# Patient Record
Sex: FEMALE | Race: BLACK OR AFRICAN AMERICAN | NOT HISPANIC OR LATINO | Employment: UNEMPLOYED | URBAN - METROPOLITAN AREA
[De-identification: names, ages, dates, MRNs, and addresses within clinical notes are randomized per-mention and may not be internally consistent; named-entity substitution may affect disease eponyms.]

---

## 2017-01-17 ENCOUNTER — OFFICE VISIT (OUTPATIENT)
Dept: INTERNAL MEDICINE | Facility: CLINIC | Age: 47
End: 2017-01-17
Payer: COMMERCIAL

## 2017-01-17 ENCOUNTER — PROCEDURE VISIT (OUTPATIENT)
Dept: PULMONOLOGY | Facility: CLINIC | Age: 47
End: 2017-01-17
Payer: COMMERCIAL

## 2017-01-17 ENCOUNTER — OFFICE VISIT (OUTPATIENT)
Dept: PULMONOLOGY | Facility: CLINIC | Age: 47
End: 2017-01-17
Payer: COMMERCIAL

## 2017-01-17 ENCOUNTER — LAB VISIT (OUTPATIENT)
Dept: LAB | Facility: HOSPITAL | Age: 47
End: 2017-01-17
Attending: NURSE PRACTITIONER
Payer: COMMERCIAL

## 2017-01-17 ENCOUNTER — TELEPHONE (OUTPATIENT)
Dept: INTERNAL MEDICINE | Facility: CLINIC | Age: 47
End: 2017-01-17

## 2017-01-17 VITALS
TEMPERATURE: 97 F | BODY MASS INDEX: 50.49 KG/M2 | DIASTOLIC BLOOD PRESSURE: 86 MMHG | HEIGHT: 56 IN | SYSTOLIC BLOOD PRESSURE: 132 MMHG | WEIGHT: 224.44 LBS

## 2017-01-17 VITALS
BODY MASS INDEX: 50.61 KG/M2 | DIASTOLIC BLOOD PRESSURE: 78 MMHG | SYSTOLIC BLOOD PRESSURE: 130 MMHG | OXYGEN SATURATION: 96 % | HEIGHT: 56 IN | WEIGHT: 225 LBS | HEART RATE: 100 BPM

## 2017-01-17 DIAGNOSIS — J01.90 ACUTE SINUSITIS, RECURRENCE NOT SPECIFIED, UNSPECIFIED LOCATION: ICD-10-CM

## 2017-01-17 DIAGNOSIS — M25.50 ARTHRALGIA, UNSPECIFIED JOINT: ICD-10-CM

## 2017-01-17 DIAGNOSIS — J45.41 MODERATE PERSISTENT ASTHMA WITH ACUTE EXACERBATION: ICD-10-CM

## 2017-01-17 DIAGNOSIS — G47.33 SEVERE OBSTRUCTIVE SLEEP APNEA: Primary | ICD-10-CM

## 2017-01-17 DIAGNOSIS — M79.10 MYALGIA: ICD-10-CM

## 2017-01-17 DIAGNOSIS — R52 BODY ACHES: ICD-10-CM

## 2017-01-17 DIAGNOSIS — R52 BODY ACHES: Primary | ICD-10-CM

## 2017-01-17 LAB
ANION GAP SERPL CALC-SCNC: 10 MMOL/L
BASOPHILS # BLD AUTO: 0.03 K/UL
BASOPHILS NFR BLD: 0.4 %
BUN SERPL-MCNC: 11 MG/DL
CALCIUM SERPL-MCNC: 10.1 MG/DL
CHLORIDE SERPL-SCNC: 97 MMOL/L
CO2 SERPL-SCNC: 32 MMOL/L
CREAT SERPL-MCNC: 0.7 MG/DL
CRP SERPL-MCNC: 28.5 MG/L
DIFFERENTIAL METHOD: ABNORMAL
EOSINOPHIL # BLD AUTO: 0.1 K/UL
EOSINOPHIL NFR BLD: 1.9 %
ERYTHROCYTE [DISTWIDTH] IN BLOOD BY AUTOMATED COUNT: 15.3 %
ERYTHROCYTE [SEDIMENTATION RATE] IN BLOOD BY WESTERGREN METHOD: 43 MM/HR
EST. GFR  (AFRICAN AMERICAN): >60 ML/MIN/1.73 M^2
EST. GFR  (NON AFRICAN AMERICAN): >60 ML/MIN/1.73 M^2
GLUCOSE SERPL-MCNC: 141 MG/DL
HCT VFR BLD AUTO: 35.4 %
HGB BLD-MCNC: 11 G/DL
LYMPHOCYTES # BLD AUTO: 2.4 K/UL
LYMPHOCYTES NFR BLD: 32.3 %
MCH RBC QN AUTO: 26 PG
MCHC RBC AUTO-ENTMCNC: 31.1 %
MCV RBC AUTO: 84 FL
MONOCYTES # BLD AUTO: 0.3 K/UL
MONOCYTES NFR BLD: 3.6 %
NEUTROPHILS # BLD AUTO: 4.6 K/UL
NEUTROPHILS NFR BLD: 61.8 %
PLATELET # BLD AUTO: 270 K/UL
PMV BLD AUTO: 9.4 FL
POTASSIUM SERPL-SCNC: 4 MMOL/L
RBC # BLD AUTO: 4.23 M/UL
SODIUM SERPL-SCNC: 139 MMOL/L
WBC # BLD AUTO: 7.43 K/UL

## 2017-01-17 PROCEDURE — 80048 BASIC METABOLIC PNL TOTAL CA: CPT | Mod: PO

## 2017-01-17 PROCEDURE — 3075F SYST BP GE 130 - 139MM HG: CPT | Mod: S$GLB,,, | Performed by: NURSE PRACTITIONER

## 2017-01-17 PROCEDURE — 86140 C-REACTIVE PROTEIN: CPT

## 2017-01-17 PROCEDURE — 99999 PR PBB SHADOW E&M-EST. PATIENT-LVL V: CPT | Mod: PBBFAC,,, | Performed by: NURSE PRACTITIONER

## 2017-01-17 PROCEDURE — 1159F MED LIST DOCD IN RCRD: CPT | Mod: S$GLB,,, | Performed by: NURSE PRACTITIONER

## 2017-01-17 PROCEDURE — 99213 OFFICE O/P EST LOW 20 MIN: CPT | Mod: S$GLB,,, | Performed by: NURSE PRACTITIONER

## 2017-01-17 PROCEDURE — 3078F DIAST BP <80 MM HG: CPT | Mod: S$GLB,,, | Performed by: NURSE PRACTITIONER

## 2017-01-17 PROCEDURE — 94060 EVALUATION OF WHEEZING: CPT | Mod: S$GLB,,, | Performed by: INTERNAL MEDICINE

## 2017-01-17 PROCEDURE — 85651 RBC SED RATE NONAUTOMATED: CPT | Mod: PO

## 2017-01-17 PROCEDURE — 99213 OFFICE O/P EST LOW 20 MIN: CPT | Mod: 25,S$GLB,, | Performed by: NURSE PRACTITIONER

## 2017-01-17 PROCEDURE — 85025 COMPLETE CBC W/AUTO DIFF WBC: CPT | Mod: PO

## 2017-01-17 PROCEDURE — 3079F DIAST BP 80-89 MM HG: CPT | Mod: S$GLB,,, | Performed by: NURSE PRACTITIONER

## 2017-01-17 RX ORDER — LINACLOTIDE 145 UG/1
145 CAPSULE, GELATIN COATED ORAL
Refills: 3 | COMMUNITY
Start: 2016-12-20 | End: 2020-02-18 | Stop reason: SDUPTHER

## 2017-01-17 RX ORDER — PREDNISONE 20 MG/1
20 TABLET ORAL AS DIRECTED
Qty: 20 TABLET | Refills: 0 | Status: SHIPPED | OUTPATIENT
Start: 2017-01-17 | End: 2017-01-30

## 2017-01-17 RX ORDER — CYCLOBENZAPRINE HCL 5 MG
5 TABLET ORAL 3 TIMES DAILY PRN
Qty: 10 TABLET | Refills: 0 | Status: SHIPPED | OUTPATIENT
Start: 2017-01-17 | End: 2017-02-15

## 2017-01-17 RX ORDER — AZITHROMYCIN 250 MG/1
TABLET, FILM COATED ORAL
Qty: 6 TABLET | Refills: 0 | Status: SHIPPED | OUTPATIENT
Start: 2017-01-17 | End: 2017-01-31

## 2017-01-17 RX ORDER — NAPROXEN SODIUM 550 MG/1
550 TABLET ORAL 2 TIMES DAILY WITH MEALS
Qty: 10 TABLET | Refills: 0 | Status: SHIPPED | OUTPATIENT
Start: 2017-01-17 | End: 2017-03-17 | Stop reason: ALTCHOICE

## 2017-01-17 RX ORDER — FLUOXETINE HYDROCHLORIDE 20 MG/1
20 CAPSULE ORAL DAILY
Refills: 0 | COMMUNITY
Start: 2016-12-20 | End: 2017-08-03

## 2017-01-17 NOTE — PROGRESS NOTES
Established Patient    Subjective:      Patient ID: Yolanda Betts is a 46 y.o. female.    Patient Active Problem List   Diagnosis    Essential hypertension    Esophageal reflux    Allergic rhinitis    Menopausal syndrome (hot flashes)    Migraines    Palpitations    Obesity, morbid, BMI 40.0-49.9    Atypical chest pain    Abnormal ECG    Obstructive sleep apnea    Psychophysiological insomnia    Sleep-related bruxism    Nightmares    Sleepwalking    Inadequate sleep hygiene    Diabetic polyneuropathy associated with type 2 diabetes mellitus    Insulin resistance       Problem list has been reviewed.    she has been referred by Linda Fernandez NP for evaluation and management for   Chief Complaint   Patient presents with    Asthma     w/review cpft    Sleep Apnea     CPAP 6 cm ordered 11/30/2016       Chief Complaint: Asthma (w/review cpft) and Sleep Apnea (CPAP 6 cm ordered 11/30/2016)      HPI:  Patient presents for CPAP compliance visit, however she has not received her CPAP equipment. Iris was contacted at this visit to determine hold up on CPAP equipment. CPAP 6 cm was ordered on 11/30/2016.   She also present for review of Spirometry done today related to diagnosis of Asthma.  Asthma  Patient presents for evaluation of productive cough of yellow mucous with post nasal drip over the past week has resumed flonase. The patient has been previously diagnosed with asthma kee 11/1/2016 suggestive of Asthmatic component.  Symptoms currently include productive cough and occur daily. No wheezing or asthma flare. Observed precipitants include: carpet in the room, fumes and smoke. Current limitations in activity from asthma: none. Number of days of school or work missed in the last month: not applicable.    Does she do nebulizer treatments? yes  Does she use an inhaler? yes  Does she use a spacer with MDIs? yes  Does she monitor peak flow rates? yes   What is her personal best peak flow rate: in  300's.     Previous Report Reviewed: lab reports, office notes, radiology reports and PSG.      Past Medical History: The following portions of the patient's history were reviewed and updated as appropriate:   She  has a past surgical history that includes  section; Mouth surgery (); Hysterectomy; Breast surgery (Bilateral, ); Tubal ligation; Colonoscopy; and Belt abdominoplasty ().  Her family history includes Asthma in her mother; COPD in her mother; Cancer in her father and maternal grandfather; Cataracts in her maternal grandfather, maternal grandmother, paternal grandfather, and paternal grandmother; Diabetes in her maternal grandfather, maternal grandmother, and mother; Glaucoma in her maternal grandfather and mother; Heart disease in her maternal grandfather, maternal grandmother, paternal grandfather, and paternal grandmother; Hyperlipidemia in her father, maternal grandfather, maternal grandmother, mother, paternal grandfather, and paternal grandmother; Hypertension in her father, maternal grandfather, maternal grandmother, mother, paternal grandfather, and paternal grandmother; Macular degeneration in her maternal grandfather; Thyroid disease in her mother.  She  reports that she has never smoked. She has never used smokeless tobacco. She reports that she drinks alcohol. She reports that she does not use illicit drugs.  She has a current medication list which includes the following prescription(s): albuterol, albuterol, alprazolam, aripiprazole, aspirin, bd insulin syringe ultra-fine, blood-glucose meter, duloxetine, ergocalciferol, esomeprazole, fenofibrate, fluoxetine, fluticasone, fluticasone-salmeterol 250-50 mcg/dose, insulin aspart, insulin detemir, linzess, metformin, naproxen sodium, nebulizer and compressor, pravastatin, topiramate, valsartan-hydrochlorothiazide, verapamil, zolpidem, azithromycin, and prednisone.  She is allergic to lortab [hydrocodone-acetaminophen];  neuromuscular blockers, steroidal; morphine; seconal [secobarbital sodium]; and tylox [oxycodone-acetaminophen]..    Review of Systems   Constitutional: Negative.    HENT: Positive for postnasal drip, rhinorrhea, sore throat and congestion. Negative for voice change.    Eyes: Negative.    Respiratory: Positive for apnea (pending set for CPAP 6 cm ), snoring, sputum production (intermittent yellow with post nasal drip) and use of rescue inhaler (twice a week). Negative for cough, chest tightness, shortness of breath, wheezing and dyspnea on extertion.    Cardiovascular: Negative.    Genitourinary: Negative.    Endocrine: endocrine negative   Musculoskeletal: Positive for back pain ( chronic).   Gastrointestinal: Positive for acid reflux ( controlled with nexium). Negative for nausea, vomiting and abdominal pain.   Neurological: Negative for headaches.   Hematological: Negative for adenopathy.   Psychiatric/Behavioral: The patient is not nervous/anxious.         Objective:     Physical Exam   Constitutional: She is oriented to person, place, and time. Vital signs are normal. She appears well-developed and well-nourished.  Non-toxic appearance. She does not appear ill. No distress.   HENT:   Head: Normocephalic.   Right Ear: External ear normal.   Left Ear: External ear normal.   Nose: Nose normal.   Mouth/Throat: Oropharynx is clear and moist. No oropharyngeal exudate.   Eyes: Conjunctivae are normal.   Neck: Normal range of motion. Neck supple.   Cardiovascular: Normal rate, regular rhythm, normal heart sounds and intact distal pulses.    Pulmonary/Chest: Effort normal and breath sounds normal. She has no wheezes. She has no rales.   Abdominal: Soft. Bowel sounds are normal.   Musculoskeletal: Normal range of motion.   Neurological: She is alert and oriented to person, place, and time.   Skin: Skin is warm and dry.   Psychiatric: She has a normal mood and affect. Her behavior is normal. Judgment and thought content  "normal.   Vitals reviewed.    Vitals:    01/17/17 0918   BP: 130/78   Pulse: 100   SpO2: 96%   Weight: 102.1 kg (225 lb)   Height: 4' 8" (1.422 m)     Estimated body mass index is 50.44 kg/(m^2) as calculated from the following:    Height as of this encounter: 4' 8" (1.422 m).    Weight as of this encounter: 102.1 kg (225 lb).    Personal Diagnostic Review  New order for cpap 6 cm on 11/30/2016. Related to PSG done 11/27/2106 revealed The diagnostic polysomnography revealed a severe obstructive sleep apnea / hypopnea syndrome (A + H Index = 33.9 events  / hr asleep with 18.0 respiratory event - arousals / hr asleep for the study, but no RERAs (respiratory effort - related  arousals). The mean SpO2 value was 93.6 %, moderate, minimum oxygen saturation during sleep was 86.0 %, and waking  baseline SpO2 was 99 %. Persistent, moderately loud snoring was noted.  2. CPAP was initiated at 1:17 am. The titration polysomnography revealed that 6 cm H2O pressure (C - Flex 3, humidity 2), the  most effective pressure most completely tested, was optimal, as it reduced the rate of respiratory events to zero in 1.3 hrs  sleep (0.7 hrs REM sleep). Snoring was soft at all pressures, but was not eliminated at any. Lower pressure also could be  successful (5 cm A + H I = 0.0, but with no REM sleep in 1.6 hrs sleep), or autoCPAP (4 cm - 20 cm) could be tried if  necessary    Pulmonary Functions Testing Results:  Pulmonary function tests: FEV1: 1.51  (78 % predicted), FVC:  1.97 (83 % predicted), FEV1/FVC:  77 (94% predicted).    Poor effort unable to reproduce reduce results.  Complete pft ordered, patient too late to perform today.     Assessment:     1. Severe obstructive sleep apnea    2. Moderate persistent asthma with acute exacerbation    3. Acute sinusitis, recurrence not specified, unspecified location      Orders Placed This Encounter   Procedures    Complete PFT with bronchodilator     Standing Status:   Future     Standing " Expiration Date:   1/17/2018     Plan:     Discussed diagnosis, its evaluation, treatment and usual course. All questions answered.    Severe obstructive sleep apnea  Comments:  New order was placed on 11/30/16 for CPAP 6 cm. was pending insur auth. should set up in 7-10 days.     Moderate persistent asthma with acute exacerbation  Comments:  controlled with advair 250-50, albuterol neb once daily. rare use of rescue inhaler. Peak flow done most days. prednisone taper printed if need for acute flare  Orders:  -     predniSONE (DELTASONE) 20 MG tablet; Take 1 tablet (20 mg total) by mouth as directed. 3 daily x 3 days, 2 daily x 3 days, 1 daily x 3 days, 1/2 daily x 4 days.  Dispense: 20 tablet; Refill: 0  -     Complete PFT with bronchodilator; Future; Expected date: 7/18/17    Acute sinusitis, recurrence not specified, unspecified location  Comments:  printed zpack script given to fill if worsening, continue flonase and otc cold meds.   Orders:  -     azithromycin (Z-MILENA) 250 MG tablet; As per packet instructions  Dispense: 6 tablet; Refill: 0       Total time spent in face to face counseling and coordination of care 25 minutes in face to face  discussion concerning diagnosis, prognosis, review of lab and test results, benefits of treatment as well as management of disease, counseling of patient and coordination of care between various health  care providers . Greater than half the time spent was used for coordination of care and counseling of patient. Discussion with other physicians or health care providers occurred.    Return in about 7 weeks (around 3/7/2017) for CPAP compliance follow up. Fu in 6 mo w/pulmonologist for asthma w/review complete pft.

## 2017-01-17 NOTE — MR AVS SNAPSHOT
[facility] - Pulmonary Services  [address]  [place] [address]  Phone: [phone]  Fax: [phone]                  Yolanda Betts   2017 9:00 AM   Office Visit    Description:  Female : 1970   Provider:  Linda Fernandez NP   Department[facility]  [facility]           Diagnoses this Visit        Comments    Severe obstructive sleep apnea    -  Primary New order was placed on 16 for CPAP 6 cm. was pending insur auth. should set up in 7-10 days.     Moderate persistent asthma with acute exacerbation     controlled with advair 250-50, albuterol neb once daily. rare use of rescue inhaler. Peak flow done most days. prednisone taper printed if need for acute flare    Acute sinusitis, recurrence not specified, unspecified location     printed zpack script given to fill if worsening, continue flonase and otc cold meds.            To Do List           Future Appointments        Provider Department Dept Phone    2017 8:15 AM LABORATORY, [facility] [phone]    3/6/2017 9:00 AM Robert Mejia MD [facility] - Endo/Diab/Metab [phone]    3/7/2017 9:00 AM Linda Fernandez NP [facility] Pulmonary Services [phone]    2017 8:40 AM PULMONARY LAB, [facility] Pulmonary Function S[facility] [phone]    2017 9:20 AM Sylvain Rainey MD [facility] Pulmonary Services [phone]      Goals (5 Years of Data)     None      Follow-Up and Disposition     Return in about 7 weeks (around 3/7/2017) for CPAP compliance follow up. Fu in 6 mo w/pulmonologist for asthma w/review complete pft.       These Medications        Disp Refills Start End    predniSONE (DELTASONE) 20 MG tablet 20 tablet 0 2017    Take 1 tablet (20 mg total) by mouth as directed. 3 daily x 3 days, 2 daily x 3 days, 1 daily x 3 days, 1/2 daily x 4 days. - Oral    Pharmacy: [facility]/pharmacy #[id number] - [place] - [address]  [place]  #: [phone]       azithromycin (Z-MILENA) 250 MG tablet 6 tablet 0 1/17/2017     As per packet instructions    Pharmacy: [facility]/pharmacy #[id number] - [place] - [address]  #: [phone]         [facility]     [facility] Nurse Care Line - 24/7 Assistance  Registered nurses in the [facility] provide clinical advisement, health education, appointment booking, and other advisory services.  Call for this free service at [phone].             Medications           Message regarding Medications     Verify the changes and/or additions to your medication regime listed below are the same as discussed with your clinician today.  If any of these changes or additions are incorrect, please notify your healthcare provider.        START taking these NEW medications        Refills    predniSONE (DELTASONE) 20 MG tablet 0    Sig: Take 1 tablet (20 mg total) by mouth as directed. 3 daily x 3 days, 2 daily x 3 days, 1 daily x 3 days, 1/2 daily x 4 days.    Class: Print    Route: Oral    azithromycin (Z-MILENA) 250 MG tablet 0    Sig: As per packet instructions    Class: Print      STOP taking these medications     cyclobenzaprine (FLEXERIL) 10 MG tablet Take 10 mg by mouth 3 (three) times daily as needed for Muscle spasms.    dexamethasone (DECADRON) 1 MG Tab Take 1 mg tablet by mouth the night before morning labs are drawn    cycloSPORINE (RESTASIS) 0.05 % ophthalmic emulsion Place 0.4 mLs (1 drop total) into both eyes 2 (two) times daily.    diclofenac potassium (CAMBIA) 50 mg PwPk Take 1 packet by mouth as needed.    ospemifene (OSPHENA) 60 mg Tab Take 60 mg by mouth once daily.    promethazine (PHENERGAN) 25 MG tablet Take 25 mg by mouth as needed for Nausea.           Verify that the below list of medications is an accurate representation of the medications you are currently taking.  If none reported, the list may be blank. If incorrect,  "please contact your healthcare provider. Carry this list with you in case of emergency.           Current Medications     albuterol (PROAIR HFA) 90 mcg/actuation inhaler Inhale 2 puffs into the lungs every 4 (four) hours as needed for Wheezing or Shortness of Breath.    albuterol (PROVENTIL) 2.5 mg /3 mL (0.083 %) nebulizer solution Take 3 mLs (2.5 mg total) by nebulization every 4 (four) hours.    alprazolam (XANAX) 1 MG tablet Take 1 mg by mouth 3 (three) times daily as needed.    aripiprazole (ABILIFY) 20 MG Tab Take 20 mg by mouth once daily.    aspirin (ECOTRIN) 81 MG EC tablet Take 81 mg by mouth once daily.    azithromycin (Z-MILENA) 250 MG tablet As per packet instructions    BD INSULIN SYRINGE ULTRA-FINE 1/2 mL 30 gauge x 1/2" Syrg     blood-glucose meter (FREESTYLE SYSTEM KIT) kit Dispense glucometer approved by patients insurance    duloxetine (CYMBALTA) 30 MG capsule Take 2 capsules (60 mg total) by mouth 2 (two) times daily.    ergocalciferol (ERGOCALCIFEROL) 50,000 unit Cap Take 1 capsule (50,000 Units total) by mouth every 7 days.    esomeprazole (NEXIUM) 40 MG capsule Take 1 capsule (40 mg total) by mouth once daily.    fenofibrate (TRICOR) 48 MG tablet Take 1 tablet (48 mg total) by mouth once daily.    fluoxetine (PROZAC) 20 MG capsule Take 20 mg by mouth once daily.    fluticasone (FLONASE) 50 mcg/actuation nasal spray 1 spray by Each Nare route once daily.    fluticasone-salmeterol 250-50 mcg/dose (ADVAIR) 250-50 mcg/dose diskus inhaler Inhale 1 puff into the lungs 2 (two) times daily.    insulin aspart (NOVOLOG FLEXPEN) 100 unit/mL InPn pen Inject 16 Units into the skin 3 (three) times daily with meals.    insulin detemir (LEVEMIR FLEXPEN) 100 unit/mL (3 mL) SubQ InPn pen Inject 26 Units into the skin 2 (two) times daily.    LINZESS 145 mcg Cap capsule Take 145 mcg by mouth once daily.    metformin (GLUCOPHAGE) 1000 MG tablet Take 1,000 mg by mouth 2 (two) times daily.    naproxen sodium (ANAPROX) " "550 MG tablet Take 550 mg by mouth 2 (two) times daily as needed.    nebulizer and compressor Mariela 1 Device by Misc.(Non-Drug; Combo Route) route every 4 (four) hours as needed.    pravastatin (PRAVACHOL) 80 MG tablet Take 1 tablet (80 mg total) by mouth once daily.    predniSONE (DELTASONE) 20 MG tablet Take 1 tablet (20 mg total) by mouth as directed. 3 daily x 3 days, 2 daily x 3 days, 1 daily x 3 days, 1/2 daily x 4 days.    topiramate (TOPAMAX) 50 MG tablet Take 1 tablet (50 mg total) by mouth 2 (two) times daily.    valsartan-hydrochlorothiazide (DIOVAN-HCT) 320-25 mg per tablet Take 1 tablet by mouth once daily.    verapamil (VERELAN) 180 MG C24P Take 1 capsule (180 mg total) by mouth once daily.    zolpidem (AMBIEN) 10 mg Tab Take 10 mg by mouth every evening.           Clinical Reference Information           Vital Signs - Last Recorded  Most recent update: 1/17/2017  9:18 AM by Génesis Dawson LPN    BP Pulse Ht Wt SpO2 BMI    130/78 100 4' 8" (1.422 m) 102.1 kg (225 lb) 96% 50.44 kg/m2      Blood Pressure          Most Recent Value    BP  130/78      Allergies as of 1/17/2017     Lortab [Hydrocodone-acetaminophen]    Neuromuscular Blockers, Steroidal    Morphine    Seconal [Secobarbital Sodium]    Tylox [Oxycodone-acetaminophen]      Immunizations Administered on Date of Encounter - 1/17/2017     None      Orders Placed During Today's Visit     Future Labs/Procedures Expected by Expires    Complete PFT with bronchodilator  7/18/2017 1/17/2018      "

## 2017-01-17 NOTE — MR AVS SNAPSHOT
[facility]  [address]  [place] [address]  Phone: [phone]  Fax: [phone]                  Yolanda Betts   2017 10:40 AM   Office Visit    Description:  Female : 1970   Provider:  KARRIE Sandoval   Department:  [facility] - Internal Medicine           Diagnoses this Visit        Comments    Body aches    -  Primary            To Do List           Future Appointments        Provider Department Dept Phone    2017 11:35 AM LABORATORY, [facility] [phone]    2017 2:20 PM Jacky Bowen PA-C [facility] [phone]    2017 8:15 AM LABORATORY, [facility] [phone]    3/6/2017 9:00 AM Robert Mejia MD [facility] - Endo/Diab/Metab [phone]    3/7/2017 9:00 AM Linda Fernandez NP [facility]monary Services [phone]      Goals (5 Years of Data)     None      Follow-Up and Disposition     Return if symptoms worsen or fail to improve.       These Medications        Disp Refills Start End    naproxen sodium (ANAPROX) 550 MG tablet 10 tablet 0 2017     Take 1 tablet (550 mg total) by mouth 2 (two) times daily with meals. - Oral    Pharmacy: [facility]/pharmacy #[id number] - [place] - [address] Ph #: [phone]       cyclobenzaprine (FLEXERIL) 5 MG tablet 10 tablet 0 2017     Take 1 tablet (5 mg total) by mouth 3 (three) times daily as needed for Muscle spasms. - Oral    Pharmacy: [facility]/pharmacy #[id number] - [place] - [address] AT [address] [facility] Ph #: [phone]         [facility] On Call     [facility] On Call Nurse Care Line -  Assistance  Registered nurses in the [facility] On Call Center provide clinical advisement, health education, appointment booking, and other advisory services.  Call for this free service at [phone].             Medications           Message regarding Medications      "Verify the changes and/or additions to your medication regime listed below are the same as discussed with your clinician today.  If any of these changes or additions are incorrect, please notify your healthcare provider.        START taking these NEW medications        Refills    cyclobenzaprine (FLEXERIL) 5 MG tablet 0    Sig: Take 1 tablet (5 mg total) by mouth 3 (three) times daily as needed for Muscle spasms.    Class: Normal    Route: Oral      CHANGE how you are taking these medications     Start Taking Instead of    naproxen sodium (ANAPROX) 550 MG tablet naproxen sodium (ANAPROX) 550 MG tablet    Dosage:  Take 1 tablet (550 mg total) by mouth 2 (two) times daily with meals. Dosage:  Take 550 mg by mouth 2 (two) times daily as needed.    Reason for Change:  Reorder            Verify that the below list of medications is an accurate representation of the medications you are currently taking.  If none reported, the list may be blank. If incorrect, please contact your healthcare provider. Carry this list with you in case of emergency.           Current Medications     albuterol (PROAIR HFA) 90 mcg/actuation inhaler Inhale 2 puffs into the lungs every 4 (four) hours as needed for Wheezing or Shortness of Breath.    albuterol (PROVENTIL) 2.5 mg /3 mL (0.083 %) nebulizer solution Take 3 mLs (2.5 mg total) by nebulization every 4 (four) hours.    alprazolam (XANAX) 1 MG tablet Take 1 mg by mouth 3 (three) times daily as needed.    aripiprazole (ABILIFY) 20 MG Tab Take 20 mg by mouth once daily.    aspirin (ECOTRIN) 81 MG EC tablet Take 81 mg by mouth once daily.    azithromycin (Z-MILENA) 250 MG tablet As per packet instructions    BD INSULIN SYRINGE ULTRA-FINE 1/2 mL 30 gauge x 1/2" Syrg     blood-glucose meter (FREESTYLE SYSTEM KIT) kit Dispense glucometer approved by patients insurance    cyclobenzaprine (FLEXERIL) 5 MG tablet Take 1 tablet (5 mg total) by mouth 3 (three) times daily as needed for Muscle spasms.    " duloxetine (CYMBALTA) 30 MG capsule Take 2 capsules (60 mg total) by mouth 2 (two) times daily.    ergocalciferol (ERGOCALCIFEROL) 50,000 unit Cap Take 1 capsule (50,000 Units total) by mouth every 7 days.    esomeprazole (NEXIUM) 40 MG capsule Take 1 capsule (40 mg total) by mouth once daily.    fenofibrate (TRICOR) 48 MG tablet Take 1 tablet (48 mg total) by mouth once daily.    fluoxetine (PROZAC) 20 MG capsule Take 20 mg by mouth once daily.    fluticasone (FLONASE) 50 mcg/actuation nasal spray 1 spray by Each Nare route once daily.    fluticasone-salmeterol 250-50 mcg/dose (ADVAIR) 250-50 mcg/dose diskus inhaler Inhale 1 puff into the lungs 2 (two) times daily.    insulin aspart (NOVOLOG FLEXPEN) 100 unit/mL InPn pen Inject 16 Units into the skin 3 (three) times daily with meals.    insulin detemir (LEVEMIR FLEXPEN) 100 unit/mL (3 mL) SubQ InPn pen Inject 26 Units into the skin 2 (two) times daily.    LINZESS 145 mcg Cap capsule Take 145 mcg by mouth once daily.    metformin (GLUCOPHAGE) 1000 MG tablet Take 1,000 mg by mouth 2 (two) times daily.    naproxen sodium (ANAPROX) 550 MG tablet Take 1 tablet (550 mg total) by mouth 2 (two) times daily with meals.    nebulizer and compressor Mariela 1 Device by Misc.(Non-Drug; Combo Route) route every 4 (four) hours as needed.    pravastatin (PRAVACHOL) 80 MG tablet Take 1 tablet (80 mg total) by mouth once daily.    predniSONE (DELTASONE) 20 MG tablet Take 1 tablet (20 mg total) by mouth as directed. 3 daily x 3 days, 2 daily x 3 days, 1 daily x 3 days, 1/2 daily x 4 days.    topiramate (TOPAMAX) 50 MG tablet Take 1 tablet (50 mg total) by mouth 2 (two) times daily.    valsartan-hydrochlorothiazide (DIOVAN-HCT) 320-25 mg per tablet Take 1 tablet by mouth once daily.    verapamil (VERELAN) 180 MG C24P Take 1 capsule (180 mg total) by mouth once daily.    zolpidem (AMBIEN) 10 mg Tab Take 10 mg by mouth every evening.           Clinical Reference Information           Vital  "Signs - Last Recorded  Most recent update: 1/17/2017 10:30 AM by Donnell Morley MA    BP Temp Ht Wt BMI    132/86 (BP Location: Right arm, Patient Position: Sitting, BP Method: Manual) 97.3 °F (36.3 °C) (Tympanic) 4' 8" (1.422 m) 101.8 kg (224 lb 6.9 oz) 50.32 kg/m2      Blood Pressure          Most Recent Value    BP  132/86      Allergies as of 1/17/2017     Lortab [Hydrocodone-acetaminophen]    Neuromuscular Blockers, Steroidal    Morphine    Seconal [Secobarbital Sodium]    Tylox [Oxycodone-acetaminophen]      Immunizations Administered on Date of Encounter - 1/17/2017     None      Orders Placed During Today's Visit      Normal Orders This Visit    Ambulatory Referral to Physiatry     Future Labs/Procedures Expected by Expires    C-reactive protein  1/17/2017 4/17/2017    CBC auto differential  1/17/2017 3/18/2018    Sedimentation rate, manual  1/17/2017 4/17/2017    Basic metabolic panel  7/16/2017 1/17/2018      "

## 2017-01-17 NOTE — TELEPHONE ENCOUNTER
----- Message from Dneise Mauro sent at 1/17/2017  2:21 PM CST -----  Please call pt back at [phone]. Pt was calling your office back.

## 2017-01-17 NOTE — PROGRESS NOTES
"Subjective:   Patient ID: Yolanda Betts is a 46 y.o. female.    Chief Complaint: No chief complaint on file.    HPI Comments: Pt presents today for c/o "pain all over". No precipitating or alleviating symptoms. States she was told she had fibromyalgia. Has been having pain for about 5 yrs now. ISAAC was negative. However the last 1 month the pain has worsened. States "usually give her naproxen and muscle relaxant that helps". No injury. No numbness or tingling. No rashes. No joint swelling    Also c/o some tenderness along side of tongue that has been present for 3 weeks .     Review of Systems   Constitutional: Negative for chills and fever.   HENT: Negative for congestion.    Eyes: Negative for visual disturbance.   Respiratory: Negative for cough and shortness of breath.    Cardiovascular: Negative for chest pain.   Gastrointestinal: Negative for abdominal pain.   Musculoskeletal: Positive for arthralgias and myalgias. Negative for gait problem and joint swelling.   Neurological: Negative for dizziness, weakness and numbness.       Objective:      Physical Exam   Constitutional: She is oriented to person, place, and time. She appears well-developed and well-nourished. No distress.   HENT:   Head: Normocephalic and atraumatic.   Mouth/Throat:       Eyes: Lids are normal.   Neck: Normal range of motion. Neck supple.   Cardiovascular: Normal rate, regular rhythm and normal heart sounds.    Pulmonary/Chest: Effort normal and breath sounds normal. No respiratory distress. She has no wheezes. She has no rales.   Musculoskeletal: Normal range of motion. She exhibits tenderness. She exhibits no edema or deformity.   ROM intact. Pt c/o of pain with palpation throughout muscles on body.    Lymphadenopathy:     She has no cervical adenopathy.   Neurological: She is alert and oriented to person, place, and time. She has normal strength.   Skin: Skin is warm and dry. No rash noted. She is not diaphoretic.   Psychiatric: She has " a normal mood and affect. Her behavior is normal. Judgment and thought content normal.   Nursing note and vitals reviewed.      Assessment:       1. Body aches    2. Arthralgia, unspecified joint    3. Myalgia        Plan:   Body aches  Arthralgia, unspecified joint  Myalgia  (ISAAC was negative in 12/2016)  -     CBC auto differential; Future; Expected date: 1/17/17  -     Basic metabolic panel; Future; Expected date: 7/16/17  -     Sedimentation rate, manual; Future; Expected date: 1/17/17  -     C-reactive protein; Future; Expected date: 1/17/17  -     Ambulatory Referral to Physiatry    Tongue pain  - monitor for next few days - needs to f/u with PCP if no improvement    Other orders  -    Refill on medication below per pt request naproxen sodium (ANAPROX) 550 MG tablet; Take 1 tablet (550 mg total) by mouth 2 (two) times daily with meals.  Dispense: 10 tablet; Refill: 0       cyclobenzaprine (FLEXERIL) 5 MG tablet; Take 1 tablet (5 mg total) by mouth 3 (three) times daily as needed for Muscle spasms.  Dispense: 10 tablet; Refill: 0  (pt states she had some steroids at home, instructed to not take with the naproxen - she verbalizes an understanding)    F/u in 1 week with her PCP    Will f/u with above results  Return if symptoms worsen or fail to improve.

## 2017-01-18 LAB
POST FEF 25 75: 1.08 L/S (ref 1.71–2.83)
POST FET 100: 8.65 S
POST FEV1 FVC: 68 %
POST FEV1: 1.28 L (ref 1.69–2.18)
POST FIF 50: 1.5 L/S
POST FVC: 1.88 L (ref 2.07–2.65)
POST PEF: 1.45 L/S (ref 4.69–6.33)
PRE FEF 25 75: 1.66 L/S (ref 1.71–2.83)
PRE FET 100: 8.14 S
PRE FEV1 FVC: 77 %
PRE FEV1: 1.51 L (ref 1.69–2.18)
PRE FIF 50: 1.39 L/S
PRE FVC: 1.97 L (ref 2.07–2.65)
PRE PEF: 2.37 L/S (ref 4.69–6.33)
PREDICTED FEV1 FVC: 81.46 % (ref 76.56–86.36)
PREDICTED FEV1: 1.93 L (ref 1.69–2.18)
PREDICTED FVC: 2.36 L (ref 2.07–2.65)
PROVOCATION PROTOCOL: ABNORMAL

## 2017-01-20 ENCOUNTER — INITIAL CONSULT (OUTPATIENT)
Dept: PHYSICAL MEDICINE AND REHAB | Facility: CLINIC | Age: 47
End: 2017-01-20
Payer: COMMERCIAL

## 2017-01-20 ENCOUNTER — HOSPITAL ENCOUNTER (OUTPATIENT)
Dept: RADIOLOGY | Facility: HOSPITAL | Age: 47
Discharge: HOME | End: 2017-01-20
Attending: PHYSICAL MEDICINE & REHABILITATION
Payer: COMMERCIAL

## 2017-01-20 VITALS
BODY MASS INDEX: 50.88 KG/M2 | WEIGHT: 226.19 LBS | SYSTOLIC BLOOD PRESSURE: 133 MMHG | HEART RATE: 100 BPM | DIASTOLIC BLOOD PRESSURE: 71 MMHG | HEIGHT: 56 IN

## 2017-01-20 DIAGNOSIS — M25.50 MULTIPLE JOINT PAIN: ICD-10-CM

## 2017-01-20 DIAGNOSIS — E11.42 DIABETIC POLYNEUROPATHY ASSOCIATED WITH TYPE 2 DIABETES MELLITUS: ICD-10-CM

## 2017-01-20 DIAGNOSIS — M54.16 LUMBAR RADICULAR PAIN: ICD-10-CM

## 2017-01-20 DIAGNOSIS — M54.5 LOW BACK PAIN, UNSPECIFIED BACK PAIN LATERALITY, UNSPECIFIED CHRONICITY, WITH SCIATICA PRESENCE UNSPECIFIED: Primary | ICD-10-CM

## 2017-01-20 DIAGNOSIS — M79.18 MYOFACIAL MUSCLE PAIN: Primary | ICD-10-CM

## 2017-01-20 DIAGNOSIS — M54.2 NECK PAIN: ICD-10-CM

## 2017-01-20 DIAGNOSIS — E66.01 MORBID OBESITY WITH BMI OF 50.0-59.9, ADULT: ICD-10-CM

## 2017-01-20 DIAGNOSIS — M54.5 LOW BACK PAIN, UNSPECIFIED BACK PAIN LATERALITY, UNSPECIFIED CHRONICITY, WITH SCIATICA PRESENCE UNSPECIFIED: ICD-10-CM

## 2017-01-20 PROCEDURE — 3075F SYST BP GE 130 - 139MM HG: CPT | Mod: S$GLB,,, | Performed by: PHYSICIAN ASSISTANT

## 2017-01-20 PROCEDURE — 72110 X-RAY EXAM L-2 SPINE 4/>VWS: CPT | Mod: TC,PO

## 2017-01-20 PROCEDURE — 99204 OFFICE O/P NEW MOD 45 MIN: CPT | Mod: S$GLB,,, | Performed by: PHYSICIAN ASSISTANT

## 2017-01-20 PROCEDURE — 3078F DIAST BP <80 MM HG: CPT | Mod: S$GLB,,, | Performed by: PHYSICIAN ASSISTANT

## 2017-01-20 PROCEDURE — 4010F ACE/ARB THERAPY RXD/TAKEN: CPT | Mod: S$GLB,,, | Performed by: PHYSICIAN ASSISTANT

## 2017-01-20 PROCEDURE — 72050 X-RAY EXAM NECK SPINE 4/5VWS: CPT | Mod: 26,,, | Performed by: RADIOLOGY

## 2017-01-20 PROCEDURE — 72110 X-RAY EXAM L-2 SPINE 4/>VWS: CPT | Mod: 26,,, | Performed by: RADIOLOGY

## 2017-01-20 PROCEDURE — 99999 PR PBB SHADOW E&M-EST. PATIENT-LVL III: CPT | Mod: PBBFAC,,, | Performed by: PHYSICIAN ASSISTANT

## 2017-01-20 PROCEDURE — 72050 X-RAY EXAM NECK SPINE 4/5VWS: CPT | Mod: TC,PO

## 2017-01-20 PROCEDURE — 3044F HG A1C LEVEL LT 7.0%: CPT | Mod: S$GLB,,, | Performed by: PHYSICIAN ASSISTANT

## 2017-01-20 PROCEDURE — 1159F MED LIST DOCD IN RCRD: CPT | Mod: S$GLB,,, | Performed by: PHYSICIAN ASSISTANT
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
## 2017-01-20 NOTE — LETTER
January 20, 2017      Christy Miller, [facility]  [address] [place] [address]           [facility] - Physiatry  [address] [place] [address]  Phone: [phone]  Fax: [phone]          Patient: Yolanda Betts   MR Number: [id number]   YOB: 1970   Date of Visit: 1/20/2017       Dear Christy Miller:    Thank you for referring Yolanda Betts to me for evaluation. Attached you will find relevant portions of my assessment and plan of care.    If you have questions, please do not hesitate to call me. I look forward to following Yolanda Betts along with you.    Sincerely,    Jacky Bowen PA-C    Enclosure  CC:  No Recipients    If you would like to receive this communication electronically, please contact [email or web address] or [phone] to request more information on [facility] access.    For providers and/or their staff who would like to refer a patient to [facility], please contact us through our one-stop-shop provider referral line, [facility] , at [phone].    If you feel you have received this communication in error or would no longer like to receive these types of communications, please e-mail [email or web address]

## 2017-01-23 ENCOUNTER — TELEPHONE (OUTPATIENT)
Dept: INTERNAL MEDICINE | Facility: CLINIC | Age: 47
End: 2017-01-23

## 2017-01-23 NOTE — TELEPHONE ENCOUNTER
----- Message from Shannon Pfeiffer sent at 1/23/2017 10:06 AM CST -----  Contact: pt  Pt returning previous call pt can be reached at [phone]//ThanksYW

## 2017-01-23 NOTE — TELEPHONE ENCOUNTER
Pt was calling in regards to having labs faxed to her PCP Dr. Nina Beltran at [phone].    Labs from 1/17/17 faxed to PCP.

## 2017-01-23 NOTE — TELEPHONE ENCOUNTER
----- Message from Denise Mauro sent at 1/20/2017  3:50 PM CST -----  Pt was calling Christy  back in the office. Please call her back at [phone].

## 2017-01-23 NOTE — TELEPHONE ENCOUNTER
Called pt about msg below. She states she spoke with the nurse but wanted more information. Explained to her that CRP and ESR was elevated. Phys. Med saw her and are doing more lab work, MRI,  and referred her to Rheum. R/t these elevated markers - pt verbalizes an understanding.

## 2017-01-25 ENCOUNTER — TELEPHONE (OUTPATIENT)
Dept: INTERNAL MEDICINE | Facility: CLINIC | Age: 47
End: 2017-01-25

## 2017-01-25 DIAGNOSIS — K14.6 BURNING TONGUE SYNDROME: Primary | ICD-10-CM

## 2017-01-25 NOTE — TELEPHONE ENCOUNTER
----- Message from KARRIE Sandoval sent at 1/25/2017  3:29 PM CST -----  I Spoke with patient today - she is c/o of tongue pain. I placed an order for ENT referral, please schedule and notify patient. She also states she was told she need to be established with an MD her at [facility]. Please make an appt with one of the MDs to establish care for her.   Thanks

## 2017-01-25 NOTE — TELEPHONE ENCOUNTER
Spoke with pt, she scheduled ENT appointment for 2/3/17 at 4 pm and appointment with Dr. Stafford for 1/31/17 at 3:40 pm.

## 2017-01-25 NOTE — TELEPHONE ENCOUNTER
F/u with patient today regarding her tongue pain. She states she is still having pain. Referral placed to ENT. She reports that she was told that she also needed to establish care with an MD here at [facility]. Msg sent to nursing staff for ENT appt and to make an appt with MD. Pt verbalizes an understanding about the above.

## 2017-01-26 ENCOUNTER — HOSPITAL ENCOUNTER (OUTPATIENT)
Dept: RADIOLOGY | Facility: HOSPITAL | Age: 47
Discharge: HOME | End: 2017-01-26
Attending: ORTHOPAEDIC SURGERY
Payer: COMMERCIAL

## 2017-01-26 DIAGNOSIS — M54.16 LUMBAR RADICULAR PAIN: ICD-10-CM

## 2017-01-26 DIAGNOSIS — M79.18 MYOFACIAL MUSCLE PAIN: ICD-10-CM

## 2017-01-26 DIAGNOSIS — E66.01 MORBID OBESITY WITH BMI OF 50.0-59.9, ADULT: ICD-10-CM

## 2017-01-26 DIAGNOSIS — M25.50 MULTIPLE JOINT PAIN: ICD-10-CM

## 2017-01-26 DIAGNOSIS — E11.42 DIABETIC POLYNEUROPATHY ASSOCIATED WITH TYPE 2 DIABETES MELLITUS: ICD-10-CM

## 2017-01-26 PROCEDURE — 72148 MRI LUMBAR SPINE W/O DYE: CPT | Mod: 26,,, | Performed by: RADIOLOGY

## 2017-01-26 PROCEDURE — 72148 MRI LUMBAR SPINE W/O DYE: CPT | Mod: TC,PO

## 2017-01-30 DIAGNOSIS — E78.5 HYPERLIPIDEMIA, UNSPECIFIED HYPERLIPIDEMIA TYPE: ICD-10-CM

## 2017-01-30 RX ORDER — PRAVASTATIN SODIUM 80 MG/1
TABLET ORAL
Qty: 30 TABLET | Refills: 12 | Status: SHIPPED | OUTPATIENT
Start: 2017-01-30 | End: 2018-02-07 | Stop reason: SDUPTHER

## 2017-01-31 ENCOUNTER — OFFICE VISIT (OUTPATIENT)
Dept: INTERNAL MEDICINE | Facility: CLINIC | Age: 47
End: 2017-01-31
Payer: COMMERCIAL

## 2017-01-31 ENCOUNTER — LAB VISIT (OUTPATIENT)
Dept: LAB | Facility: HOSPITAL | Age: 47
End: 2017-01-31
Attending: INTERNAL MEDICINE
Payer: COMMERCIAL

## 2017-01-31 VITALS
BODY MASS INDEX: 51.27 KG/M2 | TEMPERATURE: 97 F | WEIGHT: 227.94 LBS | OXYGEN SATURATION: 99 % | HEIGHT: 56 IN | HEART RATE: 106 BPM | DIASTOLIC BLOOD PRESSURE: 70 MMHG | SYSTOLIC BLOOD PRESSURE: 130 MMHG

## 2017-01-31 DIAGNOSIS — E66.01 OBESITY, MORBID, BMI 40.0-49.9: ICD-10-CM

## 2017-01-31 DIAGNOSIS — J45.20 MILD INTERMITTENT ASTHMA WITHOUT COMPLICATION: ICD-10-CM

## 2017-01-31 DIAGNOSIS — E55.9 VITAMIN D DEFICIENCY: ICD-10-CM

## 2017-01-31 DIAGNOSIS — E11.42 DIABETIC POLYNEUROPATHY ASSOCIATED WITH TYPE 2 DIABETES MELLITUS: ICD-10-CM

## 2017-01-31 DIAGNOSIS — Z00.00 PREVENTATIVE HEALTH CARE: ICD-10-CM

## 2017-01-31 DIAGNOSIS — I10 ESSENTIAL HYPERTENSION: ICD-10-CM

## 2017-01-31 DIAGNOSIS — K21.9 GASTROESOPHAGEAL REFLUX DISEASE, ESOPHAGITIS PRESENCE NOT SPECIFIED: ICD-10-CM

## 2017-01-31 DIAGNOSIS — F31.9 BIPOLAR 1 DISORDER: ICD-10-CM

## 2017-01-31 DIAGNOSIS — E78.5 HYPERLIPIDEMIA, UNSPECIFIED HYPERLIPIDEMIA TYPE: ICD-10-CM

## 2017-01-31 PROCEDURE — 90732 PPSV23 VACC 2 YRS+ SUBQ/IM: CPT | Mod: S$GLB,ICN,, | Performed by: INTERNAL MEDICINE

## 2017-01-31 PROCEDURE — 36415 COLL VENOUS BLD VENIPUNCTURE: CPT | Mod: PO

## 2017-01-31 PROCEDURE — 99999 PR PBB SHADOW E&M-EST. PATIENT-LVL III: CPT | Mod: PBBFAC,,, | Performed by: INTERNAL MEDICINE

## 2017-01-31 PROCEDURE — 3044F HG A1C LEVEL LT 7.0%: CPT | Mod: S$GLB,ICN,, | Performed by: INTERNAL MEDICINE

## 2017-01-31 PROCEDURE — 83036 HEMOGLOBIN GLYCOSYLATED A1C: CPT

## 2017-01-31 PROCEDURE — 1159F MED LIST DOCD IN RCRD: CPT | Mod: S$GLB,ICN,, | Performed by: INTERNAL MEDICINE

## 2017-01-31 PROCEDURE — 90471 IMMUNIZATION ADMIN: CPT | Mod: S$GLB,ICN,, | Performed by: INTERNAL MEDICINE

## 2017-01-31 PROCEDURE — 3075F SYST BP GE 130 - 139MM HG: CPT | Mod: S$GLB,ICN,, | Performed by: INTERNAL MEDICINE

## 2017-01-31 PROCEDURE — 3078F DIAST BP <80 MM HG: CPT | Mod: S$GLB,ICN,, | Performed by: INTERNAL MEDICINE

## 2017-01-31 PROCEDURE — 99214 OFFICE O/P EST MOD 30 MIN: CPT | Mod: 25,S$GLB,ICN, | Performed by: INTERNAL MEDICINE

## 2017-01-31 PROCEDURE — 4010F ACE/ARB THERAPY RXD/TAKEN: CPT | Mod: S$GLB,ICN,, | Performed by: INTERNAL MEDICINE

## 2017-01-31 RX ORDER — FENOFIBRATE 48 MG/1
48 TABLET, FILM COATED ORAL DAILY
Qty: 90 TABLET | Refills: 1 | Status: SHIPPED | OUTPATIENT
Start: 2017-01-31 | End: 2018-02-26

## 2017-01-31 RX ORDER — METFORMIN HYDROCHLORIDE 1000 MG/1
1000 TABLET ORAL 2 TIMES DAILY
Qty: 180 TABLET | Refills: 3 | Status: SHIPPED | OUTPATIENT
Start: 2017-01-31 | End: 2018-02-07 | Stop reason: SDUPTHER

## 2017-01-31 NOTE — MR AVS SNAPSHOT
[facility] Internal Medicine  [address] [place] [address]  Phone: [phone]  Fax: [phone]                  Yolanda Betts   2017 3:40 PM   Office Visit    Description:  Female : 1970   Provider:  Vish Stafford MD   Department:  [facility] - Internal Medicine           Reason for Visit     Establish Care           Diagnoses this Visit        Comments    Diabetic polyneuropathy associated with type 2 diabetes mellitus    -  Primary     Essential hypertension         Obesity, morbid, BMI 40.0-49.9         Gastroesophageal reflux disease, esophagitis presence not specified         Hyperlipidemia, unspecified hyperlipidemia type         Preventative health care         Uncontrolled type 2 diabetes mellitus with diabetic polyneuropathy, with long-term current use of insulin         Vitamin D deficiency         Mild intermittent asthma without complication         Bipolar 1 disorder                To Do List           Future Appointments        Provider Department Dept Phone    2017 5:45 PM LAB, SAME DAY [facility] [phone]    2/3/2017 4:00 PM Chandu Dave MD [facility] [phone]    2017 8:15 AM LABORATORY, [facility] [phone]    3/6/2017 9:00 AM Robert Mejia MD [facility] - Endo/Diab/Metab [phone]    3/7/2017 9:00 AM Linda Fernandez NP [facility] Pulmonary Services [phone]      Goals (5 Years of Data)     None      Follow-Up and Disposition     Return in about 3 months (around 2017), or if symptoms worsen or fail to improve.       These Medications        Disp Refills Start End    metformin (GLUCOPHAGE) 1000 MG tablet 180 tablet 3 2017     Take 1 tablet (1,000 mg total) by mouth 2 (two) times daily. - Oral    Pharmacy: [facility]/pharmacy #[id number] - [place] - [address] AT [facility] Ph #: [phone]       fenofibrate (TRICOR) 48 MG tablet 90 tablet 1 2017      Take 1 tablet (48 mg total) by mouth once daily. - Oral    Pharmacy: [facility]/pharmacy #[id number] - [place], [place] - [address] AT [facility] #: [phone]         [facility] On Call     [facility] On Call Nurse Care Line - 24/7 Assistance  Registered nurses in the [facility] On Call Center provide clinical advisement, health education, appointment booking, and other advisory services.  Call for this free service at [phone].             Medications           Message regarding Medications     Verify the changes and/or additions to your medication regime listed below are the same as discussed with your clinician today.  If any of these changes or additions are incorrect, please notify your healthcare provider.        CHANGE how you are taking these medications     Start Taking Instead of    metformin (GLUCOPHAGE) 1000 MG tablet metformin (GLUCOPHAGE) 1000 MG tablet    Dosage:  Take 1 tablet (1,000 mg total) by mouth 2 (two) times daily. Dosage:  Take 1,000 mg by mouth 2 (two) times daily.    Reason for Change:  Reorder       STOP taking these medications     esomeprazole (NEXIUM) 40 MG capsule Take 1 capsule (40 mg total) by mouth once daily.    azithromycin (Z-MILENA) 250 MG tablet As per packet instructions           Verify that the below list of medications is an accurate representation of the medications you are currently taking.  If none reported, the list may be blank. If incorrect, please contact your healthcare provider. Carry this list with you in case of emergency.           Current Medications     albuterol (PROAIR HFA) 90 mcg/actuation inhaler Inhale 2 puffs into the lungs every 4 (four) hours as needed for Wheezing or Shortness of Breath.    albuterol (PROVENTIL) 2.5 mg /3 mL (0.083 %) nebulizer solution Take 3 mLs (2.5 mg total) by nebulization every 4 (four) hours.    alprazolam (XANAX) 1 MG tablet Take 1 mg by mouth 3 (three) times daily as needed.    aripiprazole (ABILIFY) 20  "MG Tab Take 20 mg by mouth once daily.    aspirin (ECOTRIN) 81 MG EC tablet Take 81 mg by mouth once daily.    BD INSULIN SYRINGE ULTRA-FINE 1/2 mL 30 gauge x 1/2" Syrg     blood-glucose meter (FREESTYLE SYSTEM KIT) kit Dispense glucometer approved by patients insurance    cyclobenzaprine (FLEXERIL) 5 MG tablet Take 1 tablet (5 mg total) by mouth 3 (three) times daily as needed for Muscle spasms.    duloxetine (CYMBALTA) 30 MG capsule Take 2 capsules (60 mg total) by mouth 2 (two) times daily.    ergocalciferol (ERGOCALCIFEROL) 50,000 unit Cap Take 1 capsule (50,000 Units total) by mouth every 7 days.    fenofibrate (TRICOR) 48 MG tablet Take 1 tablet (48 mg total) by mouth once daily.    fluoxetine (PROZAC) 20 MG capsule Take 20 mg by mouth once daily.    fluticasone (FLONASE) 50 mcg/actuation nasal spray 1 spray by Each Nare route once daily.    fluticasone-salmeterol 250-50 mcg/dose (ADVAIR) 250-50 mcg/dose diskus inhaler Inhale 1 puff into the lungs 2 (two) times daily.    insulin aspart (NOVOLOG FLEXPEN) 100 unit/mL InPn pen Inject 16 Units into the skin 3 (three) times daily with meals.    insulin detemir (LEVEMIR FLEXPEN) 100 unit/mL (3 mL) SubQ InPn pen Inject 26 Units into the skin 2 (two) times daily.    LINZESS 145 mcg Cap capsule Take 145 mcg by mouth once daily.    metformin (GLUCOPHAGE) 1000 MG tablet Take 1 tablet (1,000 mg total) by mouth 2 (two) times daily.    naproxen sodium (ANAPROX) 550 MG tablet Take 1 tablet (550 mg total) by mouth 2 (two) times daily with meals.    nebulizer and compressor Mariela 1 Device by Misc.(Non-Drug; Combo Route) route every 4 (four) hours as needed.    pravastatin (PRAVACHOL) 80 MG tablet TAKE 1 TABLET BY MOUTH ONCE DAILY    topiramate (TOPAMAX) 50 MG tablet Take 1 tablet (50 mg total) by mouth 2 (two) times daily.    valsartan-hydrochlorothiazide (DIOVAN-HCT) 320-25 mg per tablet Take 1 tablet by mouth once daily.    verapamil (VERELAN) 180 MG C24P Take 1 capsule (180 " "mg total) by mouth once daily.    zolpidem (AMBIEN) 10 mg Tab Take 10 mg by mouth every evening.           Clinical Reference Information           Vital Signs - Last Recorded  Most recent update: 1/31/2017  3:46 PM by Mayela Gurrola MA    BP Pulse Temp Ht Wt SpO2    130/70 106 97.3 °F (36.3 °C) (Tympanic) 4' 8" (1.422 m) 103.4 kg (227 lb 15.3 oz) 99%    BMI                51.11 kg/m2          Blood Pressure          Most Recent Value    BP  130/70      Allergies as of 1/31/2017     Lortab [Hydrocodone-acetaminophen]    Neuromuscular Blockers, Steroidal    Morphine    Seconal [Secobarbital Sodium]    Tylox [Oxycodone-acetaminophen]      Immunizations Administered on Date of Encounter - 1/31/2017     Name Date Dose VIS Date Route    Pneumococcal Polysaccharide - 23 Valent  Incomplete 0.5 mL 4/24/2015 Intramuscular      Orders Placed During Today's Visit      Normal Orders This Visit    Ambulatory referral to Gynecology     Pneumococcal Polysaccharide Vaccine (23 Valent) (SQ/IM)     Future Labs/Procedures Expected by Expires    Hemoglobin A1c  1/31/2017 1/31/2018      Instructions    Contact your endocrinologist regarding your recent glucose readings.        "

## 2017-01-31 NOTE — PROGRESS NOTES
"Subjective:      Patient ID: Yolanda Betts is a 46 y.o. female.    Chief Complaint: Establish Care    HPI Comments: 45 yo with Patient Active Problem List:     Essential hypertension     Esophageal reflux     Allergic rhinitis     Menopausal syndrome (hot flashes)     Migraines     Palpitations     Obesity, morbid, BMI 40.0-49.9     Atypical chest pain     Abnormal ECG     Obstructive sleep apnea     Psychophysiological insomnia     Sleep-related bruxism     Nightmares     Sleepwalking     Inadequate sleep hygiene     Diabetic polyneuropathy associated with type 2 diabetes mellitus     Insulin resistance    Here today for management of multiple medical problems present for months to years.  She reports compliance with her medications without significant side effects with the exception of not taking vitamin D supplementation and not taking Nexium.  Nexium is too expensive and she requests another medication for her chronic GERD symptoms. Reports glucose readings worsened after mult doses of steroids due to asthma. Last dose of steroids was early December. Only mild improvement this month. Home glucose 110-120. Some nf readings 200-400s.     Review of Systems   Constitutional: Negative for chills and fever.   HENT: Negative for ear pain and sore throat.    Respiratory: Negative for cough, shortness of breath and wheezing.    Cardiovascular: Negative for chest pain and palpitations.   Gastrointestinal: Negative for abdominal pain and blood in stool.   Genitourinary: Negative for dysuria and hematuria.   Musculoskeletal: Positive for arthralgias and myalgias.   Skin: Negative for rash.   Neurological: Negative for syncope.     Objective:     Visit Vitals    /70    Pulse 106    Temp 97.3 °F (36.3 °C) (Tympanic)    Ht 4' 8" (1.422 m)    Wt 103.4 kg (227 lb 15.3 oz)    SpO2 99%    BMI 51.11 kg/m2       Physical Exam   Constitutional: She is oriented to person, place, and time. She appears well-developed and " well-nourished. No distress.   HENT:   Head: Normocephalic and atraumatic.   Mouth/Throat: Oropharynx is clear and moist.   Eyes: Conjunctivae and EOM are normal.   Cardiovascular: Normal rate and regular rhythm.    Pulmonary/Chest: Breath sounds normal. She has no wheezes. She has no rales.   Neurological: She is alert and oriented to person, place, and time.   Skin: Skin is warm and dry.   Psychiatric: She has a normal mood and affect. Her behavior is normal.     Lab Visit on 01/20/2017   Component Date Value Ref Range Status    ISAAC Screen 01/20/2017 Negative <1:160  Negative <1:160 Final    CCP Antibodies 01/20/2017 <0.5  <5.0 U/mL Final    Rheumatoid Factor 01/20/2017 <10.0  0.0 - 15.0 IU/mL Final       Assessment:     1. Uncontrolled type 2 diabetes mellitus with diabetic polyneuropathy, with long-term current use of insulin    2. Diabetic polyneuropathy associated with type 2 diabetes mellitus    3. Essential hypertension    4. Obesity, morbid, BMI 40.0-49.9    5. Gastroesophageal reflux disease, esophagitis presence not specified    6. Hyperlipidemia, unspecified hyperlipidemia type    7. Preventative health care    8. Vitamin D deficiency    9. Mild intermittent asthma without complication    10. Bipolar 1 disorder      Plan:   Uncontrolled type 2 diabetes mellitus with diabetic polyneuropathy, with long-term current use of insulin  Recently increase Levemir as directed by her endocrinologist  Has had only one hypoglycemic episode  Refilled metformin  Advised patient to submit recent glucose readings as requested by her endocrinologist  -     metformin (GLUCOPHAGE) 1000 MG tablet; Take 1 tablet (1,000 mg total) by mouth 2 (two) times daily.  Dispense: 180 tablet; Refill: 3    Diabetic polyneuropathy associated with type 2 diabetes mellitus  Stable on Cymbalta    -     Hemoglobin A1c; Future; Expected date: 1/31/17  -     Pneumococcal Polysaccharide Vaccine (23 Valent) (SQ/IM)    Essential  hypertension  Controlled    Obesity, morbid, BMI 40.0-49.9  Diet and exercise discussed  Complete cortisol testing as ordered by endocrinology    Gastroesophageal reflux disease, esophagitis presence not specified  Try omeprazole    Hyperlipidemia, unspecified hyperlipidemia type  Stable  refilled  -     fenofibrate (TRICOR) 48 MG tablet; Take 1 tablet (48 mg total) by mouth once daily.  Dispense: 90 tablet; Refill: 1    Preventative health care  -     Ambulatory referral to Gynecology    Vitamin D deficiency  Resume vitamin D supplement    Mild intermittent asthma without complication  Stable    Bipolar 1 disorder  Continue meds and follow-up as per psychiatry          Lab Frequency Next Occurrence   Complete PFT with bronchodilator Once 11/29/2016   Hemoglobin A1c Once 5/19/2017   CORTISOL, 8AM Once 12/1/2016   Complete PFT with bronchodilator Once 7/18/2017         Return in about 3 months (around 4/30/2017), or if symptoms worsen or fail to improve.

## 2017-02-02 LAB
ESTIMATED AVG GLUCOSE: 197 MG/DL
HBA1C MFR BLD HPLC: 8.5 %

## 2017-02-09 ENCOUNTER — OFFICE VISIT (OUTPATIENT)
Dept: PAIN MEDICINE | Facility: CLINIC | Age: 47
End: 2017-02-09
Payer: COMMERCIAL

## 2017-02-09 VITALS
HEIGHT: 56 IN | HEART RATE: 97 BPM | SYSTOLIC BLOOD PRESSURE: 142 MMHG | BODY MASS INDEX: 51.07 KG/M2 | WEIGHT: 227 LBS | DIASTOLIC BLOOD PRESSURE: 95 MMHG | RESPIRATION RATE: 16 BRPM

## 2017-02-09 DIAGNOSIS — G62.9 PERIPHERAL POLYNEUROPATHY: ICD-10-CM

## 2017-02-09 DIAGNOSIS — M79.18 MYOFASCIAL PAIN: Primary | ICD-10-CM

## 2017-02-09 DIAGNOSIS — M47.817 SPONDYLOSIS OF LUMBOSACRAL REGION WITHOUT MYELOPATHY OR RADICULOPATHY: ICD-10-CM

## 2017-02-09 PROCEDURE — 3080F DIAST BP >= 90 MM HG: CPT | Mod: S$GLB,,, | Performed by: ANESTHESIOLOGY

## 2017-02-09 PROCEDURE — 3077F SYST BP >= 140 MM HG: CPT | Mod: S$GLB,,, | Performed by: ANESTHESIOLOGY

## 2017-02-09 PROCEDURE — 99999 PR PBB SHADOW E&M-EST. PATIENT-LVL III: CPT | Mod: PBBFAC,,, | Performed by: ANESTHESIOLOGY

## 2017-02-09 PROCEDURE — 99204 OFFICE O/P NEW MOD 45 MIN: CPT | Mod: S$GLB,,, | Performed by: ANESTHESIOLOGY

## 2017-02-09 RX ORDER — GABAPENTIN 300 MG/1
300 CAPSULE ORAL 3 TIMES DAILY
Qty: 90 CAPSULE | Refills: 0 | Status: SHIPPED | OUTPATIENT
Start: 2017-02-09 | End: 2017-03-17 | Stop reason: SDUPTHER

## 2017-02-09 NOTE — LETTER
February 9, 2017      Jacky Bowen PA-C  [address] [place] [address] [address] [place] [address]           [facility]  [address] [place] [address] [place] [address]  Phone: [phone]  Fax: [phone]          Patient: Yolanda Betts   MR Number: [id number]   YOB: 1970   Date of Visit: 2/9/2017       Dear Jacky Bowen:    Thank you for referring Yolanda Betts to me for evaluation. Attached you will find relevant portions of my assessment and plan of care.    If you have questions, please do not hesitate to call me. I look forward to following Yolanda Betts along with you.    Sincerely,    Grey Moran MD    Enclosure  CC:  No Recipients    If you would like to receive this communication electronically, please contact [email or web address] or [phone] to request more information on [employer] Link access.    For providers and/or their staff who would like to refer a patient to [facility], please contact us through our one-stop-shop provider referral line, [facility] , at [phone].    If you feel you have received this communication in error or would no longer like to receive these types of communications, please e-mail [email or web address]

## 2017-02-09 NOTE — PROGRESS NOTES
Chief Pain Complaint:  Low Back Pain, Left Leg Pain    History of Present Illness:   This patient is a 46 y.o. female who presents today complaining of the above noted pain/s. The patient describes the pain as follows.    - duration of pain: 10 years   - timing: constant   - character: aching, numbing  - radiating, dermatomal: extends into left leg, not strictly dermatomal  - antecedent trauma, prior spinal surgery: no prior trauma, no prior spinal surgery   - pertinent negatives: No fever, No chills, No weight loss, No bladder dysfunction, No bowel dysfunction, No saddle anesthesia  - pertinent positives: left leg weakness    - medications, other therapies tried (physical therapy, injections):     >> Tylenol, flexeril, NSAIDs, cymbalta    >> Has previously undergone Physical Therapy    >> Has NOT previously undergone spinal injection/s      Imaging / Labs / Studies (reviewed on 2/9/2017):      Results for orders placed during the hospital encounter of 01/26/17   MRI Lumbar Spine Without Contrast    Narrative No priors. Multiplanar sequences obtained of the lumbar spine.  Findings: Vertebral alignment is normal. The conus ends at the level of L1. Intervertebral discs are well-hydrated and disc spaces are maintained. No compression fractures or acute osseous abnormalities are identified. Axial images were acquired through the disc spaces from L1 to to L5-S1.  The L1-2 disc space is unremarkable.  The L2-3 disc space is unremarkable.  The L3-4 disc space is unremarkable.  The L4-5 disc space is unremarkable.  At L5-S1 there is facet arthropathy more pronounced on the right. There is no significant disc herniation, canal or foraminal stenosis.         Results for orders placed during the hospital encounter of 01/20/17   X-Ray Lumbar Spine Complete 5 View    Narrative Five views of the lumbar spine  Findings: The vertebral bodies demonstrate normal height.  The alignment is within normal limits. The disk space heights  "are maintained. Prominent bilateral facet arthropathy noted at the L5-S1 level with more mild facet arthropathy noted at L4-L5 level bilaterally. No pars defects.              Results for orders placed during the hospital encounter of 01/20/17   X-Ray Cervical Spine Complete 5 view    Narrative Five views of the cervical spine  Findings: The vertebral bodies demonstrate normal height.  The alignment is within normal limits. The disk space heights are maintained. The C1-C2 articulation is within normal limits. No osseus encroachment noted upon the neural foraminal canals. No prevertebral soft tissue swelling.         Review of Systems:  CONSTITUTIONAL: patient denies any fever, chills, or weight loss  SKIN: patient denies any rash or itching  RESPIRATORY: patient reports dyspnea with rest  GASTROINTESTINAL: patient reports constipation  GENITOURINARY: patient reports urgency    MUSCULOSKELETAL:  - patient complains of the above noted pain/s (see chief pain complaint)    NEUROLOGICAL:   - pain as above  - strength in Lower extremities is decreased, on the LEFT  - sensation in Lower extremities is abnormal, on the LEFT  - patient denies any loss of bowel or bladder control      PSYCHIATRIC: patient reports a history of anxiety and depression    Other:  All other systems reviewed and are negative      Physical Exam:  Visit Vitals    BP (!) 142/95 (BP Location: Right arm, Patient Position: Sitting, BP Method: Automatic)    Pulse 97    Resp 16    Ht 4' 8" (1.422 m)    Wt 103 kg (227 lb)    BMI 50.89 kg/m2    (reviewed on 2/9/2017)  General: alert and oriented, in no apparent distress  Gait: normal gait  Skin: No rashes, No discoloration, No obvious lesions  HEENT: EOMI  Cardiovascular: no significant peripheral edema present  Respiratory: respirations nonlabored    Musculoskeletal:  - Any pain on flexion, extension, rotation:    >> pain on extension and rotation  - Straight Leg Raise:     >> LEFT :: negative    >> " RIGHT :: negative    - Any tenderness to palpation across paraspinal muscles, joints, bursae:     >> many tender points including across lumbar paraspinals    Neuro:  - Extremity Strength:     >> LEFT :: 5/5    >> RIGHT :: 5/5  - Extremity Reflexes:    >> LEFT  :: 2+    >> RIGHT :: 2+  - Sensory (sensation to light touch):    >> LEFT :: intact    >> RIGHT :: intact     Psych:  Mood and affect is appropriate      Assessment:  Myofascial Pain  Lumbar Spondylosis  Peripheral neuropathy    Plan:  Patient has primarily myofascial pain.  MRI reviewed, only minor facet dz.  Patient scoring on the American College of Rheumatology Fibromyalgia Diagnostic Questionnaire is consistent with fibromyalgia diagnosis.  I will trial gabapentin, she will continue cymbalta.  I recommend alpha lipoic acid.  I discuss cognitive behavioral therapy, she can address this with her psychiatrist at next visit.  I recommend daily exercise, weight loss, quality sleep.  Imaging / studies reviewed, detailed above.  I discussed in detail the risks, benefits, and alternatives to any and all potential treatment options.  All questions and concerns were fully addressed today in clinic.      >>Pain Disability Index:      >>Opioid Risk Tool:  2/9/2017 :: 10

## 2017-02-09 NOTE — MR AVS SNAPSHOT
[facility]  [address] [facility] [address]  Phone: [phone]  Fax: [phone]                  Yolanda Betts   2017 3:00 PM   Office Visit    Description:  Female : 1970   Provider:  Grey Moran MD   Department:  [facility]           Reason for Visit     Low-back Pain           Diagnoses this Visit        Comments    Myofascial pain    -  Primary            To Do List           Future Appointments        Provider Department Dept Phone    2017 3:30 PM Ashish Gibbs MD [facility] - OB/ [phone]    3/6/2017 9:00 AM Robert Mejia MD [facility] - Endo/Diab/Metab [phone]    3/7/2017 9:00 AM Linda Fernandez NP [facility] - Pulmonary Services [phone]    3/15/2017 10:40 AM Fallon Zacarias PA-C [facility] [phone]    3/24/2017 2:00 PM Tona Kessler MD [facility] - Rheumatology [phone]      Goals (5 Years of Data)     None      Follow-Up and Disposition     Return in about 4 weeks (around 3/9/2017).       These Medications        Disp Refills Start End    gabapentin (NEURONTIN) 300 MG capsule 90 capsule 0 2017 3/11/2017    Take 1 capsule (300 mg total) by mouth 3 (three) times daily. Take 1 cap qhs for 1 wk, then 2 caps qhs x 1 wk, then 3 caps qhs thereafter - Oral    Pharmacy: [facility]/pharmacy #[id number] - [place], [place] - [address] AT [facility] Ph #: [phone]         [facility] On Call     [facility] On [facility] Nurse Care Line -  Assistance  Registered nurses in the [facility] Center provide clinical advisement, health education, appointment booking, and other advisory services.  Call for this free service at [phone].             Medications           Message regarding Medications     Verify the changes and/or additions to your medication regime listed below are the same as discussed with your clinician today.  If any of these changes or  "additions are incorrect, please notify your healthcare provider.        START taking these NEW medications        Refills    gabapentin (NEURONTIN) 300 MG capsule 0    Sig: Take 1 capsule (300 mg total) by mouth 3 (three) times daily. Take 1 cap qhs for 1 wk, then 2 caps qhs x 1 wk, then 3 caps qhs thereafter    Class: Normal    Route: Oral           Verify that the below list of medications is an accurate representation of the medications you are currently taking.  If none reported, the list may be blank. If incorrect, please contact your healthcare provider. Carry this list with you in case of emergency.           Current Medications     albuterol (PROAIR HFA) 90 mcg/actuation inhaler Inhale 2 puffs into the lungs every 4 (four) hours as needed for Wheezing or Shortness of Breath.    albuterol (PROVENTIL) 2.5 mg /3 mL (0.083 %) nebulizer solution Take 3 mLs (2.5 mg total) by nebulization every 4 (four) hours.    alprazolam (XANAX) 1 MG tablet Take 1 mg by mouth 3 (three) times daily as needed.    aripiprazole (ABILIFY) 20 MG Tab Take 20 mg by mouth once daily.    aspirin (ECOTRIN) 81 MG EC tablet Take 81 mg by mouth once daily.    BD INSULIN SYRINGE ULTRA-FINE 1/2 mL 30 gauge x 1/2" Syrg     blood-glucose meter (FREESTYLE SYSTEM KIT) kit Dispense glucometer approved by patients insurance    cyclobenzaprine (FLEXERIL) 5 MG tablet Take 1 tablet (5 mg total) by mouth 3 (three) times daily as needed for Muscle spasms.    duloxetine (CYMBALTA) 30 MG capsule Take 2 capsules (60 mg total) by mouth 2 (two) times daily.    ergocalciferol (ERGOCALCIFEROL) 50,000 unit Cap Take 1 capsule (50,000 Units total) by mouth every 7 days.    fenofibrate (TRICOR) 48 MG tablet Take 1 tablet (48 mg total) by mouth once daily.    fluoxetine (PROZAC) 20 MG capsule Take 20 mg by mouth once daily.    fluticasone (FLONASE) 50 mcg/actuation nasal spray 1 spray by Each Nare route once daily.    fluticasone-salmeterol 250-50 mcg/dose (ADVAIR) " "250-50 mcg/dose diskus inhaler Inhale 1 puff into the lungs 2 (two) times daily.    insulin aspart (NOVOLOG FLEXPEN) 100 unit/mL InPn pen Inject 16 Units into the skin 3 (three) times daily with meals.    insulin detemir (LEVEMIR FLEXPEN) 100 unit/mL (3 mL) SubQ InPn pen Inject 26 Units into the skin 2 (two) times daily.    LINZESS 145 mcg Cap capsule Take 145 mcg by mouth once daily.    metformin (GLUCOPHAGE) 1000 MG tablet Take 1 tablet (1,000 mg total) by mouth 2 (two) times daily.    naproxen sodium (ANAPROX) 550 MG tablet Take 1 tablet (550 mg total) by mouth 2 (two) times daily with meals.    nebulizer and compressor Mariela 1 Device by Misc.(Non-Drug; Combo Route) route every 4 (four) hours as needed.    pravastatin (PRAVACHOL) 80 MG tablet TAKE 1 TABLET BY MOUTH ONCE DAILY    topiramate (TOPAMAX) 50 MG tablet Take 1 tablet (50 mg total) by mouth 2 (two) times daily.    valsartan-hydrochlorothiazide (DIOVAN-HCT) 320-25 mg per tablet Take 1 tablet by mouth once daily.    verapamil (VERELAN) 180 MG C24P Take 1 capsule (180 mg total) by mouth once daily.    zolpidem (AMBIEN) 10 mg Tab Take 10 mg by mouth every evening.    gabapentin (NEURONTIN) 300 MG capsule Take 1 capsule (300 mg total) by mouth 3 (three) times daily. Take 1 cap qhs for 1 wk, then 2 caps qhs x 1 wk, then 3 caps qhs thereafter           Clinical Reference Information           Your Vitals Were     BP Pulse Resp Height Weight BMI    142/95 (BP Location: Right arm, Patient Position: Sitting, BP Method: Automatic) 97 16 4' 8" (1.422 m) 103 kg (227 lb) 50.89 kg/m2      Blood Pressure          Most Recent Value    BP  (!)  142/95      Allergies as of 2/9/2017     Lortab [Hydrocodone-acetaminophen]    Neuromuscular Blockers, Steroidal    Morphine    Seconal [Secobarbital Sodium]    Tylox [Oxycodone-acetaminophen]      Immunizations Administered on Date of Encounter - 2/9/2017     None      Language Assistance Services     ATTENTION: Language assistance " services are available, free of charge. Please call [phone].      ATENCIÓN: Si habla saul, tiene a presley disposición servicios gratuitos de asistencia lingüística. Llame al [phone].     CHÚ Ý: N?u b?n nói Ti?ng Vi?t, có các d?ch v? h? tr? ngôn ng? mi?n phí dành cho b?n. G?i s? [phone].         [facility] complies with applicable Federal civil rights laws and does not discriminate on the basis of race, color, national origin, age, disability, or sex.

## 2017-02-12 ENCOUNTER — NURSE TRIAGE (OUTPATIENT)
Dept: ADMINISTRATIVE | Facility: CLINIC | Age: 47
End: 2017-02-12

## 2017-02-12 NOTE — TELEPHONE ENCOUNTER
"  Answer Assessment - Initial Assessment Questions  1. VOMITING SEVERITY: "How many times have you vomited in the past 24 hours?"      - MILD:  1 - 2 times/day     - MODERATE: 3 - 5 times/day, decreased oral intake without significant weight loss or symptoms of dehydration     - SEVERE: 6 or more times/day, vomits everything or nearly everything, with significant weight loss, symptoms of dehydration       4-5 times   2. ONSET: "When did the vomiting begin?"       This morning   3. FLUIDS: "What fluids or food have you vomited up today?" "Have you been able to keep any fluids down?"      No   4. ABDOMINAL PAIN: "Are your having any abdominal pain?" If yes : "How bad is it and what does it feel like?" (e.g., crampy, dull, intermittent, constant)       Abdominal cramps  5. DIARRHEA: "Is there any diarrhea?" If so, ask: "How many times today?"       4-5  6. CONTACTS: "Is there anyone else in the family with the same symptoms?"       No  7. CAUSE: "What do you think is causing your vomiting?"      Unsure   8. HYDRATION STATUS: "Any signs of dehydration?" (e.g., dry mouth [not only dry lips], too weak to stand) "When did you last urinate?"      Not a  9. OTHER SYMPTOMS: "Do you have any other symptoms?" (e.g., fever, headache, vertigo, vomiting blood or coffee grounds)      Unsure  10. PREGNANCY: "Is there any chance you are pregnant?" "When was your last menstrual period?"        N/A    Protocols used: ST VOMITING-A-    "

## 2017-02-12 NOTE — TELEPHONE ENCOUNTER
Reason for Disposition   High-risk adult (e.g., diabetes mellitus, brain tumor, V-P shunt, hernia)    Protocols used: ST VOMITING-A-

## 2017-02-15 ENCOUNTER — HOSPITAL ENCOUNTER (OUTPATIENT)
Dept: RADIOLOGY | Facility: HOSPITAL | Age: 47
Discharge: HOME | End: 2017-02-15
Attending: INTERNAL MEDICINE
Payer: COMMERCIAL

## 2017-02-15 ENCOUNTER — OFFICE VISIT (OUTPATIENT)
Dept: INTERNAL MEDICINE | Facility: CLINIC | Age: 47
End: 2017-02-15
Payer: COMMERCIAL

## 2017-02-15 ENCOUNTER — TELEPHONE (OUTPATIENT)
Dept: INTERNAL MEDICINE | Facility: CLINIC | Age: 47
End: 2017-02-15

## 2017-02-15 VITALS
HEART RATE: 99 BPM | SYSTOLIC BLOOD PRESSURE: 146 MMHG | BODY MASS INDEX: 50.94 KG/M2 | HEIGHT: 56 IN | TEMPERATURE: 96 F | OXYGEN SATURATION: 95 % | WEIGHT: 226.44 LBS | DIASTOLIC BLOOD PRESSURE: 94 MMHG

## 2017-02-15 DIAGNOSIS — J45.20 MILD INTERMITTENT ASTHMA WITHOUT COMPLICATION: ICD-10-CM

## 2017-02-15 DIAGNOSIS — I10 ESSENTIAL HYPERTENSION: ICD-10-CM

## 2017-02-15 DIAGNOSIS — K14.6 TONGUE PAIN: ICD-10-CM

## 2017-02-15 DIAGNOSIS — R05.9 COUGH: ICD-10-CM

## 2017-02-15 DIAGNOSIS — F31.9 BIPOLAR 1 DISORDER: ICD-10-CM

## 2017-02-15 DIAGNOSIS — J01.90 ACUTE SINUSITIS, RECURRENCE NOT SPECIFIED, UNSPECIFIED LOCATION: Primary | ICD-10-CM

## 2017-02-15 PROCEDURE — 3060F POS MICROALBUMINURIA REV: CPT | Mod: S$GLB,,, | Performed by: PHYSICIAN ASSISTANT

## 2017-02-15 PROCEDURE — 71020 XR CHEST PA AND LATERAL: CPT | Mod: 26,,, | Performed by: RADIOLOGY

## 2017-02-15 PROCEDURE — 99214 OFFICE O/P EST MOD 30 MIN: CPT | Mod: S$GLB,,, | Performed by: PHYSICIAN ASSISTANT

## 2017-02-15 PROCEDURE — 99999 PR PBB SHADOW E&M-EST. PATIENT-LVL V: CPT | Mod: PBBFAC,,, | Performed by: PHYSICIAN ASSISTANT

## 2017-02-15 PROCEDURE — 3080F DIAST BP >= 90 MM HG: CPT | Mod: S$GLB,,, | Performed by: PHYSICIAN ASSISTANT

## 2017-02-15 PROCEDURE — 71020 XR CHEST PA AND LATERAL: CPT | Mod: TC,PO

## 2017-02-15 PROCEDURE — 3077F SYST BP >= 140 MM HG: CPT | Mod: S$GLB,,, | Performed by: PHYSICIAN ASSISTANT

## 2017-02-15 PROCEDURE — 3045F PR MOST RECENT HEMOGLOBIN A1C LEVEL 7.0-9.0%: CPT | Mod: S$GLB,,, | Performed by: PHYSICIAN ASSISTANT

## 2017-02-15 RX ORDER — PREDNISONE 20 MG/1
20 TABLET ORAL 2 TIMES DAILY
Qty: 6 TABLET | Refills: 0 | Status: SHIPPED | OUTPATIENT
Start: 2017-02-15 | End: 2017-02-18

## 2017-02-15 RX ORDER — DOXYCYCLINE HYCLATE 100 MG
100 TABLET ORAL EVERY 12 HOURS
Qty: 20 TABLET | Refills: 0 | Status: SHIPPED | OUTPATIENT
Start: 2017-02-15 | End: 2017-02-25

## 2017-02-15 NOTE — MR AVS SNAPSHOT
[facility]  [address]  [place] [address]  Phone: [phone]  Fax: [phone]                  Yolanda Betts   2/15/2017 9:40 AM   Office Visit    Description:  Female : 1970   Provider:  LINO Blanco   Department:  [facility]           Diagnoses this Visit        Comments    Acute sinusitis, recurrence not specified, unspecified location    -  Primary     Cough         Tongue pain                To Do List           Future Appointments        Provider Department Dept Phone    2/15/2017 10:15 AM JPL XR1 [facility] [phone]    2017 8:15 AM MD [facility] Smith - Otohinolaryngology [phone]    3/6/2017 9:00 AM Robert Mejia MD [facility] - Endo/Diab/Metab [phone]    3/7/2017 9:00 AM Linda Fernandez NP [facility] - Pulmonary Services [phone]    3/14/2017 2:40 PM MARJ[facility] - Interventional Pain [phone]      Goals (5 Years of Data)     None       These Medications        Disp Refills Start End    doxycycline (VIBRA-TABS) 100 MG tablet 20 tablet 0 2/15/2017 2017    Take 1 tablet (100 mg total) by mouth every 12 (twelve) hours. - Oral    Pharmacy: [facility]/pharmacy #[id number] - [place] - [address] [facility] Ph #: [phone]       predniSONE (DELTASONE) 20 MG tablet 6 tablet 0 2/15/2017 2017    Take 1 tablet (20 mg total) by mouth 2 (two) times daily. - Oral    Pharmacy: [facility]/pharmacy #[id number] - [place] - [address] [facility] Ph #: [phone]         [facility] On Call     [facility] On Call Nurse Care Line -  Assistance  Registered nurses in the [facility] On Call Center provide clinical advisement, health education, appointment booking, and other advisory services.  Call for this free service at [phone].             Medications           Message regarding Medications     Verify the changes  "and/or additions to your medication regime listed below are the same as discussed with your clinician today.  If any of these changes or additions are incorrect, please notify your healthcare provider.        START taking these NEW medications        Refills    doxycycline (VIBRA-TABS) 100 MG tablet 0    Sig: Take 1 tablet (100 mg total) by mouth every 12 (twelve) hours.    Class: Normal    Route: Oral    predniSONE (DELTASONE) 20 MG tablet 0    Sig: Take 1 tablet (20 mg total) by mouth 2 (two) times daily.    Class: Normal    Route: Oral      STOP taking these medications     cyclobenzaprine (FLEXERIL) 5 MG tablet Take 1 tablet (5 mg total) by mouth 3 (three) times daily as needed for Muscle spasms.           Verify that the below list of medications is an accurate representation of the medications you are currently taking.  If none reported, the list may be blank. If incorrect, please contact your healthcare provider. Carry this list with you in case of emergency.           Current Medications     albuterol (PROAIR HFA) 90 mcg/actuation inhaler Inhale 2 puffs into the lungs every 4 (four) hours as needed for Wheezing or Shortness of Breath.    albuterol (PROVENTIL) 2.5 mg /3 mL (0.083 %) nebulizer solution Take 3 mLs (2.5 mg total) by nebulization every 4 (four) hours.    alprazolam (XANAX) 1 MG tablet Take 1 mg by mouth 3 (three) times daily as needed.    aripiprazole (ABILIFY) 20 MG Tab Take 20 mg by mouth once daily.    aspirin (ECOTRIN) 81 MG EC tablet Take 81 mg by mouth once daily.    BD INSULIN SYRINGE ULTRA-FINE 1/2 mL 30 gauge x 1/2" Syrg     blood-glucose meter (FREESTYLE SYSTEM KIT) kit Dispense glucometer approved by patients insurance    doxycycline (VIBRA-TABS) 100 MG tablet Take 1 tablet (100 mg total) by mouth every 12 (twelve) hours.    duloxetine (CYMBALTA) 30 MG capsule Take 2 capsules (60 mg total) by mouth 2 (two) times daily.    ergocalciferol (ERGOCALCIFEROL) 50,000 unit Cap Take 1 capsule " (50,000 Units total) by mouth every 7 days.    fenofibrate (TRICOR) 48 MG tablet Take 1 tablet (48 mg total) by mouth once daily.    fluoxetine (PROZAC) 20 MG capsule Take 20 mg by mouth once daily.    fluticasone (FLONASE) 50 mcg/actuation nasal spray 1 spray by Each Nare route once daily.    fluticasone-salmeterol 250-50 mcg/dose (ADVAIR) 250-50 mcg/dose diskus inhaler Inhale 1 puff into the lungs 2 (two) times daily.    gabapentin (NEURONTIN) 300 MG capsule Take 1 capsule (300 mg total) by mouth 3 (three) times daily. Take 1 cap qhs for 1 wk, then 2 caps qhs x 1 wk, then 3 caps qhs thereafter    insulin aspart (NOVOLOG FLEXPEN) 100 unit/mL InPn pen Inject 16 Units into the skin 3 (three) times daily with meals.    insulin detemir (LEVEMIR FLEXPEN) 100 unit/mL (3 mL) SubQ InPn pen Inject 26 Units into the skin 2 (two) times daily.    LINZESS 145 mcg Cap capsule Take 145 mcg by mouth once daily.    metformin (GLUCOPHAGE) 1000 MG tablet Take 1 tablet (1,000 mg total) by mouth 2 (two) times daily.    naproxen sodium (ANAPROX) 550 MG tablet Take 1 tablet (550 mg total) by mouth 2 (two) times daily with meals.    nebulizer and compressor Mariela 1 Device by Misc.(Non-Drug; Combo Route) route every 4 (four) hours as needed.    pravastatin (PRAVACHOL) 80 MG tablet TAKE 1 TABLET BY MOUTH ONCE DAILY    predniSONE (DELTASONE) 20 MG tablet Take 1 tablet (20 mg total) by mouth 2 (two) times daily.    topiramate (TOPAMAX) 50 MG tablet Take 1 tablet (50 mg total) by mouth 2 (two) times daily.    valsartan-hydrochlorothiazide (DIOVAN-HCT) 320-25 mg per tablet Take 1 tablet by mouth once daily.    verapamil (VERELAN) 180 MG C24P Take 1 capsule (180 mg total) by mouth once daily.    zolpidem (AMBIEN) 10 mg Tab Take 10 mg by mouth every evening.           Clinical Reference Information           Your Vitals Were     BP Pulse Temp Height    146/94 (BP Location: Right arm, Patient Position: Sitting, BP Method: Manual) 99 96.1 °F (35.6  "°C) (Tympanic) 4' 8" (1.422 m)    Weight SpO2 BMI    102.7 kg (226 lb 6.6 oz) 95% 50.76 kg/m2      Blood Pressure          Most Recent Value    BP  (!)  146/94      Allergies as of 2/15/2017     Lortab [Hydrocodone-acetaminophen]    Neuromuscular Blockers, Steroidal    Morphine    Seconal [Secobarbital Sodium]    Tylox [Oxycodone-acetaminophen]      Immunizations Administered on Date of Encounter - 2/15/2017     None      Orders Placed During Today's Visit      Normal Orders This Visit    Ambulatory referral to ENT     Future Labs/Procedures Expected by Expires    X-Ray Chest PA And Lateral  2/15/2017 2/15/2018      Language Assistance Services     ATTENTION: Language assistance services are available, free of charge. Please call [phone].      ATENCIÓN: Si franciscola saul, tiene a presley disposición servicios gratuitos de asistencia lingüística. Llame al [phone].     CHÚ Ý: N?u b?n nói Ti?ng Vi?t, có các d?ch v? h? tr? ngôn ng? mi?n phí dành cho b?n. G?i s? [phone].         [facility] complies with applicable Federal civil rights laws and does not discriminate on the basis of race, color, national origin, age, disability, or sex.        "

## 2017-02-15 NOTE — PROGRESS NOTES
Subjective:       Patient ID: Yolanda Betts is a 46 y.o. female.    Chief Complaint: Sinus Problem and Cough    HPI Comments: 46 year old female c/o headaches, dizziness, dry cough, sinus pressure, chills, B ear pressure, and intermittent SOB/wheezing X 3 weeks. She also reports having chronic ulcers to sides of tongue (painful) X months - constant. She reports no fever, nasal congestion, rhinorrhea, sore throat, N/V, CP, edema, rash, or other medical complaints. She has used Flonase without relief. She has used albuterol inhaler with short-term improvement of SOB/wheezing. She reports taking her prescribed medications as directed, including her Advair BID. She reports her glucose has been about 140.    Past Medical History:    Anemia                                                        Anxiety                                                       Asthma                                          10/11/2016    Bipolar 1 disorder                                            Diabetes mellitus, type 2                                     Diabetic neuropathy                                           GERD (gastroesophageal reflux disease)                        Hyperlipidemia                                                Hypertension                                                  Migraine                                                      Obesity                                         11/1/2016         Sinus Problem   Associated symptoms include chills, coughing, headaches, shortness of breath and sinus pressure. Pertinent negatives include no sore throat.   Cough   Associated symptoms include chills, headaches, shortness of breath and wheezing. Pertinent negatives include no chest pain, fever, rash, rhinorrhea or sore throat.     Review of Systems   Constitutional: Positive for chills. Negative for fever.   HENT: Positive for sinus pressure. Negative for rhinorrhea and sore throat.    Respiratory: Positive for cough,  shortness of breath and wheezing.    Cardiovascular: Negative for chest pain, palpitations and leg swelling.   Gastrointestinal: Negative for nausea and vomiting.   Skin: Negative for rash.   Neurological: Positive for headaches. Negative for dizziness, weakness and numbness.   Psychiatric/Behavioral: Negative for confusion.       Objective:      Physical Exam   Constitutional: She is oriented to person, place, and time. She appears well-developed and well-nourished. No distress.   HENT:   Head: Normocephalic and atraumatic.   Right Ear: Tympanic membrane and ear canal normal.   Left Ear: Tympanic membrane and ear canal normal.   Nose: Right sinus exhibits maxillary sinus tenderness and frontal sinus tenderness. Left sinus exhibits maxillary sinus tenderness and frontal sinus tenderness.   Mouth/Throat: Oropharynx is clear and moist. No oropharyngeal exudate.   Cobblestone appearance of posterior pharynx; few abnormal lesions (healing open wounds?) to sides of tongue   Eyes: EOM are normal. No scleral icterus.   Neck: Neck supple.   Cardiovascular: Normal rate and regular rhythm.    Pulmonary/Chest: Effort normal. No respiratory distress. She has no decreased breath sounds. She has no wheezes. She has no rhonchi. She has rales (?) in the right lower field.   Musculoskeletal: Normal range of motion. She exhibits no edema.   Lymphadenopathy:        Head (right side): Tonsillar adenopathy present.        Head (left side): Tonsillar adenopathy present.   Neurological: She is alert and oriented to person, place, and time. No cranial nerve deficit.   Skin: Skin is warm and dry. No rash noted.   Psychiatric: She has a normal mood and affect. Her speech is normal and behavior is normal. Thought content normal.       Assessment:       1. Acute sinusitis, recurrence not specified, unspecified location    2. Cough    3. Mild intermittent asthma without complication    4. Tongue pain    5. Essential hypertension    6.  Uncontrolled type 2 diabetes mellitus with diabetic polyneuropathy, with long-term current use of insulin    7. Bipolar 1 disorder        Plan:         1. CXR today with review following.  2. Doxycycline X 10 days. Prednisone 20mg BID X 3 days - monitor glucose - pt reports having insulin sliding scale.  3. Refer to ENT for chronic tongue lesions and pain.  4. Monitor for new or worsening symptoms. Consider pulm (pt saw pulm last month) if resp sxs persist or worsen.  5. BP elevated today. Monitor BP. F/u with PCP for health management. ER if severe sxs or BP occur

## 2017-03-06 ENCOUNTER — OFFICE VISIT (OUTPATIENT)
Dept: OTOLARYNGOLOGY | Facility: CLINIC | Age: 47
End: 2017-03-06
Payer: COMMERCIAL

## 2017-03-06 VITALS
BODY MASS INDEX: 51.75 KG/M2 | SYSTOLIC BLOOD PRESSURE: 124 MMHG | TEMPERATURE: 98 F | DIASTOLIC BLOOD PRESSURE: 79 MMHG | WEIGHT: 230.81 LBS | HEART RATE: 100 BPM

## 2017-03-06 DIAGNOSIS — K14.8 TONGUE LESION: ICD-10-CM

## 2017-03-06 DIAGNOSIS — J06.9 UPPER RESPIRATORY TRACT INFECTION, UNSPECIFIED TYPE: Primary | ICD-10-CM

## 2017-03-06 PROCEDURE — 99203 OFFICE O/P NEW LOW 30 MIN: CPT | Mod: S$GLB,,, | Performed by: ORTHOPAEDIC SURGERY

## 2017-03-06 PROCEDURE — 3078F DIAST BP <80 MM HG: CPT | Mod: S$GLB,,, | Performed by: ORTHOPAEDIC SURGERY

## 2017-03-06 PROCEDURE — 99999 PR PBB SHADOW E&M-EST. PATIENT-LVL IV: CPT | Mod: PBBFAC,,, | Performed by: ORTHOPAEDIC SURGERY

## 2017-03-06 PROCEDURE — 1160F RVW MEDS BY RX/DR IN RCRD: CPT | Mod: S$GLB,,, | Performed by: ORTHOPAEDIC SURGERY

## 2017-03-06 PROCEDURE — 3074F SYST BP LT 130 MM HG: CPT | Mod: S$GLB,,, | Performed by: ORTHOPAEDIC SURGERY

## 2017-03-06 RX ORDER — CYCLOSPORINE 0.5 MG/ML
1 EMULSION OPHTHALMIC 2 TIMES DAILY
Refills: 3 | COMMUNITY
Start: 2017-02-22 | End: 2017-08-31 | Stop reason: ALTCHOICE

## 2017-03-06 NOTE — PROGRESS NOTES
Subjective:       Patient ID: Yolanda Betts is a 46 y.o. female.    Chief Complaint: Ulcer on tongue; Ears popping; Dry nose; Sinus Problem; and Cough    HPI Comments: Patient is a very pleasant 46 year old female here to see me today for the first time for evaluation of ulcers on her tongue.  She says that they have been present for the last six or seven months, and have not changed size or location.  They do make it difficult for her to speak and swallow. She last saw her dentist about a year ago, and at that time she had no dental disease.  She uses a toothpaste with a whitening agent, no other mouth rinses.  She does not smoke.  She is on several inhalers, started in August or September (she thinks the ulcers started in May).  She does sleep with CPAP, humidified, nasal mask only.  She has recently been diagnosed with fibromyalgia with Dr. Moran, no autoimmune disorders.  She is also having issues with nasal congestion, postnasal drip and a headache.  She has a popping sensation in her ears.  She has issues with nausea as well since yesterday.  She is also using Flonase daily, and has been since September.  She says that she started feeling poorly yesterday.    Review of Systems   Constitutional: Negative for chills, fatigue, fever and unexpected weight change.   HENT: Positive for congestion, postnasal drip, rhinorrhea and sinus pressure. Negative for dental problem, ear discharge, ear pain (ear popping), facial swelling, hearing loss, nosebleeds, sneezing, sore throat, tinnitus, trouble swallowing and voice change (voice seems deeper).    Eyes: Negative for redness, itching and visual disturbance.   Respiratory: Positive for cough and wheezing (subjective). Negative for choking and shortness of breath.    Cardiovascular: Positive for palpitations (at her baseline). Negative for chest pain.   Gastrointestinal: Positive for nausea. Negative for abdominal pain.        Has reflux   Musculoskeletal: Negative for  gait problem.   Skin: Negative for rash.   Neurological: Positive for dizziness, light-headedness and headaches.       Objective:      Physical Exam   Constitutional: She is oriented to person, place, and time. She appears well-developed and well-nourished. No distress.   HENT:   Head: Normocephalic and atraumatic.   Right Ear: Tympanic membrane, external ear and ear canal normal.   Left Ear: Tympanic membrane, external ear and ear canal normal.   Nose: Nose normal. No mucosal edema, rhinorrhea, nasal deformity or septal deviation. No epistaxis. Right sinus exhibits no maxillary sinus tenderness and no frontal sinus tenderness. Left sinus exhibits no maxillary sinus tenderness and no frontal sinus tenderness.   Mouth/Throat: Uvula is midline, oropharynx is clear and moist and mucous membranes are normal. Mucous membranes are not pale and not dry. No dental caries. No oropharyngeal exudate or posterior oropharyngeal erythema.   Area of concern adjacent to carious tooth on left posterior molar, no overlying ulceration or mass palpated, no other ulceration in oral cavity, base of tongue and floor of mouth soft to palpation   Eyes: Conjunctivae, EOM and lids are normal. Pupils are equal, round, and reactive to light. Right eye exhibits no chemosis. Left eye exhibits no chemosis. Right conjunctiva is not injected. Left conjunctiva is not injected. No scleral icterus. Right eye exhibits normal extraocular motion and no nystagmus. Left eye exhibits normal extraocular motion and no nystagmus.   Neck: Trachea normal and phonation normal. No tracheal tenderness present. No tracheal deviation present. No thyroid mass and no thyromegaly present.   Cardiovascular: Intact distal pulses.    Pulmonary/Chest: Effort normal. No stridor. No respiratory distress.   Abdominal: She exhibits no distension.   Lymphadenopathy:        Head (right side): No submental, no submandibular, no preauricular, no posterior auricular and no occipital  adenopathy present.        Head (left side): No submental, no submandibular, no preauricular, no posterior auricular and no occipital adenopathy present.     She has no cervical adenopathy.   Neurological: She is alert and oriented to person, place, and time. No cranial nerve deficit.   Skin: Skin is warm and dry. No rash noted. No erythema.   Psychiatric: She has a normal mood and affect. Her behavior is normal.       Assessment:       1. Upper respiratory tract infection, unspecified type    2. Tongue lesion        Plan:       1.  URI:  She has now had symptoms for 24 hours.  I would recommend supportive care.  I would recommend aggressive hydration with oral Mucinex.  Avoid decongestants as much as possible, as that will make her nose overly dry.  Continue with saline and humidified CPAP machine.  2.  Tongue lesion:  Likely related to underlying carious teeth traumatizing tongue, sharp edge felt on left posterior molar.  I would recommend she try dental wax to the sharp edges, and see her dentist.  If she continues to have pain following that then please return to clinic.

## 2017-03-06 NOTE — LETTER
March 6, 2017      Anita Gandara MD  [address] [facility] [facility] [address]           [facility]  [id number] [place] [address]  [place] [address]  Phone: [phone]  Fax: [phone]          Patient: Yolanda Betts   MR Number: [id number]   YOB: 1970   Date of Visit: 3/6/2017       Dear Dr. Anita Gandara:    Thank you for referring Yolanda Betts to me for evaluation. Attached you will find relevant portions of my assessment and plan of care.    If you have questions, please do not hesitate to call me. I look forward to following Yolanda Betts along with you.    Sincerely,    Sangita Hinton MD    Enclosure  CC:  No Recipients    If you would like to receive this communication electronically, please contact [email or web address] or [phone] to request more information on [employer] Link access.    For providers and/or their staff who would like to refer a patient to [facility], please contact us through our one-stop-shop provider referral line, [facility], at [phone].    If you feel you have received this communication in error or would no longer like to receive these types of communications, please e-mail [email or web address]

## 2017-03-09 ENCOUNTER — TELEPHONE (OUTPATIENT)
Dept: INTERNAL MEDICINE | Facility: CLINIC | Age: 47
End: 2017-03-09

## 2017-03-09 ENCOUNTER — OFFICE VISIT (OUTPATIENT)
Dept: INTERNAL MEDICINE | Facility: CLINIC | Age: 47
End: 2017-03-09
Payer: COMMERCIAL

## 2017-03-09 VITALS
SYSTOLIC BLOOD PRESSURE: 124 MMHG | WEIGHT: 229.5 LBS | TEMPERATURE: 97 F | BODY MASS INDEX: 51.63 KG/M2 | DIASTOLIC BLOOD PRESSURE: 82 MMHG | HEIGHT: 56 IN | OXYGEN SATURATION: 96 % | HEART RATE: 96 BPM

## 2017-03-09 DIAGNOSIS — R19.7 DIARRHEA, UNSPECIFIED TYPE: ICD-10-CM

## 2017-03-09 DIAGNOSIS — E11.42 TYPE 2 DIABETES MELLITUS WITH DIABETIC POLYNEUROPATHY, WITH LONG-TERM CURRENT USE OF INSULIN: ICD-10-CM

## 2017-03-09 DIAGNOSIS — B35.3 TINEA PEDIS OF LEFT FOOT: ICD-10-CM

## 2017-03-09 DIAGNOSIS — I10 ESSENTIAL HYPERTENSION: ICD-10-CM

## 2017-03-09 DIAGNOSIS — R11.2 NAUSEA AND VOMITING, INTRACTABILITY OF VOMITING NOT SPECIFIED, UNSPECIFIED VOMITING TYPE: Primary | ICD-10-CM

## 2017-03-09 DIAGNOSIS — Z79.4 TYPE 2 DIABETES MELLITUS WITH DIABETIC POLYNEUROPATHY, WITH LONG-TERM CURRENT USE OF INSULIN: ICD-10-CM

## 2017-03-09 LAB
FLUAV AG SPEC QL IA: NEGATIVE
FLUBV AG SPEC QL IA: NEGATIVE
SPECIMEN SOURCE: NORMAL

## 2017-03-09 PROCEDURE — 99214 OFFICE O/P EST MOD 30 MIN: CPT | Mod: S$GLB,,, | Performed by: PHYSICIAN ASSISTANT

## 2017-03-09 PROCEDURE — 99999 PR PBB SHADOW E&M-EST. PATIENT-LVL V: CPT | Mod: PBBFAC,,, | Performed by: PHYSICIAN ASSISTANT

## 2017-03-09 PROCEDURE — 87400 INFLUENZA A/B EACH AG IA: CPT | Mod: 59,PO

## 2017-03-09 PROCEDURE — 3060F POS MICROALBUMINURIA REV: CPT | Mod: S$GLB,,, | Performed by: PHYSICIAN ASSISTANT

## 2017-03-09 PROCEDURE — 1160F RVW MEDS BY RX/DR IN RCRD: CPT | Mod: S$GLB,,, | Performed by: PHYSICIAN ASSISTANT

## 2017-03-09 PROCEDURE — 3045F PR MOST RECENT HEMOGLOBIN A1C LEVEL 7.0-9.0%: CPT | Mod: S$GLB,,, | Performed by: PHYSICIAN ASSISTANT

## 2017-03-09 PROCEDURE — 3074F SYST BP LT 130 MM HG: CPT | Mod: S$GLB,,, | Performed by: PHYSICIAN ASSISTANT

## 2017-03-09 PROCEDURE — 3079F DIAST BP 80-89 MM HG: CPT | Mod: S$GLB,,, | Performed by: PHYSICIAN ASSISTANT

## 2017-03-09 RX ORDER — CLOTRIMAZOLE 1 %
CREAM (GRAM) TOPICAL 2 TIMES DAILY
Qty: 30 G | Refills: 0 | Status: SHIPPED | OUTPATIENT
Start: 2017-03-09 | End: 2017-08-03

## 2017-03-09 RX ORDER — PROMETHAZINE HYDROCHLORIDE 25 MG/1
25 TABLET ORAL
Qty: 20 TABLET | Refills: 0 | Status: SHIPPED | OUTPATIENT
Start: 2017-03-09 | End: 2019-09-27 | Stop reason: SDUPTHER

## 2017-03-09 NOTE — MR AVS SNAPSHOT
[facility] Medicine  [address] [facility] [place] [address]  Phone: [phone]  Fax: [phone]                  Yolanda Betts   3/9/2017 8:20 AM   Office Visit    Description:  Female : 1970   Provider:  LINO Blanco   Department:  [facility] - Internal Medicine           Reason for Visit     Emesis     Diarrhea           Diagnoses this Visit        Comments    Nausea and vomiting, intractability of vomiting not specified, unspecified vomiting type    -  Primary     Diarrhea, unspecified type         Tinea pedis of left foot         Essential hypertension         Type 2 diabetes mellitus with diabetic polyneuropathy, with long-term current use of insulin                To Do List           Future Appointments        Provider Department Dept Phone    3/14/2017 2:40 PM LINO Rios-C [facility] - Interventional Pain [phone]    3/24/2017 2:00 PM Tona Kessler MD [facility] - Rheumatology [phone]    2017 3:40 PM Vish Stafford MD [facility] Internal [facility] [phone]    2017 8:40 AM PULMONARY LAB, [facility]- Pulmonary Function [facility] [phone]    2017 9:20 AM Sylvain Rainey MD [facility] Pulmonary Services [phone]      Goals (5 Years of Data)     None       These Medications        Disp Refills Start End    promethazine (PHENERGAN) 25 MG tablet 20 tablet 0 3/9/2017     Take 1 tablet (25 mg total) by mouth every 4 to 6 hours as needed for Nausea. sedating - Oral    Pharmacy: [facility]/pharmacy #[id number] - [place] - [address] AT [facility] Ph #: [phone]       clotrimazole (LOTRIMIN) 1 % cream 30 g 0 3/9/2017     Apply topically 2 (two) times daily. Between toes X 2 weeks - Topical (Top)    Pharmacy: [facility]/pharmacy #[id number] - [place], [place] - [address] AT [facility] Ph #: [phone]         [facility]     [facility] Care Line -  Assistance  Registered nurses  "in the [facility] provide clinical advisement, health education, appointment booking, and other advisory services.  Call for this free service at [phone].             Medications           Message regarding Medications     Verify the changes and/or additions to your medication regime listed below are the same as discussed with your clinician today.  If any of these changes or additions are incorrect, please notify your healthcare provider.        START taking these NEW medications        Refills    promethazine (PHENERGAN) 25 MG tablet 0    Sig: Take 1 tablet (25 mg total) by mouth every 4 to 6 hours as needed for Nausea. sedating    Class: Normal    Route: Oral    clotrimazole (LOTRIMIN) 1 % cream 0    Sig: Apply topically 2 (two) times daily. Between toes X 2 weeks    Class: Normal    Route: Topical (Top)           Verify that the below list of medications is an accurate representation of the medications you are currently taking.  If none reported, the list may be blank. If incorrect, please contact your healthcare provider. Carry this list with you in case of emergency.           Current Medications     albuterol (PROAIR HFA) 90 mcg/actuation inhaler Inhale 2 puffs into the lungs every 4 (four) hours as needed for Wheezing or Shortness of Breath.    albuterol (PROVENTIL) 2.5 mg /3 mL (0.083 %) nebulizer solution Take 3 mLs (2.5 mg total) by nebulization every 4 (four) hours.    alprazolam (XANAX) 1 MG tablet Take 1 mg by mouth 3 (three) times daily as needed.    aripiprazole (ABILIFY) 20 MG Tab Take 20 mg by mouth once daily.    aspirin (ECOTRIN) 81 MG EC tablet Take 81 mg by mouth once daily.    BD INSULIN SYRINGE ULTRA-FINE 1/2 mL 30 gauge x 1/2" Syrg     blood-glucose meter (FREESTYLE SYSTEM KIT) kit Dispense glucometer approved by patients insurance    duloxetine (CYMBALTA) 30 MG capsule Take 2 capsules (60 mg total) by mouth 2 (two) times daily.    ergocalciferol (ERGOCALCIFEROL) 50,000 " unit Cap Take 1 capsule (50,000 Units total) by mouth every 7 days.    fenofibrate (TRICOR) 48 MG tablet Take 1 tablet (48 mg total) by mouth once daily.    fluoxetine (PROZAC) 20 MG capsule Take 20 mg by mouth once daily.    fluticasone (FLONASE) 50 mcg/actuation nasal spray 1 spray by Each Nare route once daily.    fluticasone-salmeterol 250-50 mcg/dose (ADVAIR) 250-50 mcg/dose diskus inhaler Inhale 1 puff into the lungs 2 (two) times daily.    gabapentin (NEURONTIN) 300 MG capsule Take 1 capsule (300 mg total) by mouth 3 (three) times daily. Take 1 cap qhs for 1 wk, then 2 caps qhs x 1 wk, then 3 caps qhs thereafter    insulin aspart (NOVOLOG FLEXPEN) 100 unit/mL InPn pen Inject 16 Units into the skin 3 (three) times daily with meals.    insulin detemir (LEVEMIR FLEXPEN) 100 unit/mL (3 mL) SubQ InPn pen Inject 26 Units into the skin 2 (two) times daily.    LINZESS 145 mcg Cap capsule Take 145 mcg by mouth once daily.    metformin (GLUCOPHAGE) 1000 MG tablet Take 1 tablet (1,000 mg total) by mouth 2 (two) times daily.    naproxen sodium (ANAPROX) 550 MG tablet Take 1 tablet (550 mg total) by mouth 2 (two) times daily with meals.    nebulizer and compressor Mariela 1 Device by Misc.(Non-Drug; Combo Route) route every 4 (four) hours as needed.    pravastatin (PRAVACHOL) 80 MG tablet TAKE 1 TABLET BY MOUTH ONCE DAILY    RESTASIS 0.05 % ophthalmic emulsion Place 1 drop into both eyes 2 (two) times daily.    topiramate (TOPAMAX) 50 MG tablet Take 1 tablet (50 mg total) by mouth 2 (two) times daily.    valsartan-hydrochlorothiazide (DIOVAN-HCT) 320-25 mg per tablet Take 1 tablet by mouth once daily.    verapamil (VERELAN) 180 MG C24P Take 1 capsule (180 mg total) by mouth once daily.    zolpidem (AMBIEN) 10 mg Tab Take 10 mg by mouth every evening.    clotrimazole (LOTRIMIN) 1 % cream Apply topically 2 (two) times daily. Between toes X 2 weeks    promethazine (PHENERGAN) 25 MG tablet Take 1 tablet (25 mg total) by mouth  "every 4 to 6 hours as needed for Nausea. sedating           Clinical Reference Information           Your Vitals Were     BP Pulse Temp Height Weight SpO2    124/82 (BP Location: Right arm, Patient Position: Sitting, BP Method: Manual) 96 97 °F (36.1 °C) (Tympanic) 4' 8" (1.422 m) 104.1 kg (229 lb 8 oz) 96%    BMI                51.45 kg/m2          Blood Pressure          Most Recent Value    BP  124/82      Allergies as of 3/9/2017     Lortab [Hydrocodone-acetaminophen]    Neuromuscular Blockers, Steroidal    Morphine    Seconal [Secobarbital Sodium]    Tylox [Oxycodone-acetaminophen]      Immunizations Administered on Date of Encounter - 3/9/2017     None      Orders Placed During Today's Visit      Normal Orders This Visit    Influenza antigen Nasopharyngeal Swab       Instructions      [place] Diet  Your healthcare provider may recommend a bland diet if you have an upset stomach. It consists of foods that are mild and easy to digest. It is better to eat small frequent meals rather than 3 large meals a day.    Beverages  OK: Fruit juices, non-caffeinated teas and coffee, non-carbonated butler  Avoid: Carbonated beverage, caffeinated tea and coffee, all alcoholic beverages  Bread  OK: Refined white, wheat or rye bread, giselle or soda crackers, [place] toast, plain rolls, bagels  Avoid: Whole-grain bread  Cereal  OK: Refined cereals: cooked or ready to eat  Avoid: Whole-grain cereals and granola, or those containing bran, seeds or nuts  Desserts  OK: Peanut butter and all others except those to "avoid"  Avoid: Chocolate, cocoa, coconut, popcorn, nuts, seeds, jam, marmalade  Fruits  OK: Canned, cooked, frozen or fresh fruits without seeds or tough skin  Avoid: Olives, skin and seeds of fruit  Meats  OK: All fresh or preserved meat, fish and fowl  Avoid: Any that are prepared with those spices to "avoid"  Cheese and eggs  OK: Eggs, cottage cheese, cream cheese, other cheeses  Avoid: All cheeses made with those spices to " ""avoid"  Potatoes and pasta  OK: Potato, rice, macaroni, noodles, spaghetti  Avoid: None  Soups  OK: All soups without heavy seasoning  Avoid: Soups made with those spices to "avoid"  Vegetables  OK: Canned, cooked, fresh or frozen mildly flavored vegetables without seeds, skins or coarse fiber  Avoid: Vegetables prepared with those spices to "avoid"; skin and seeds of vegetables and those with coarse fiber  Spices  OK: Salt, lemon and lime juice, vinegar, all extracts, rose, cinnamon, thyme, mace, allspice, paprika  Avoid: Chili powder, cloves, pepper, seed spices, garlic, gravy pickles, highly seasoned salad dressings  Date Last Reviewed: 11/20/2015 © 2000-2016 [employer]. [address] [place] [address]. All rights reserved. This information is not intended as a substitute for professional medical care. Always follow your healthcare professional's instructions.             Language Assistance Services     ATTENTION: Language assistance services are available, free of charge. Please call [phone].      ATENCIÓN: Si franciscola saul, tiene a presley disposición servicios gratuitos de asistencia lingüística. Llame al [phone].     ANGLE Ý: N?u b?n nói Ti?ng Vi?t, có các d?ch v? h? tr? ngôn ng? mi?n phí dành cho b?n. G?i s? [phone].         [facility] complies with applicable Federal civil rights laws and does not discriminate on the basis of race, color, national origin, age, disability, or sex.        "

## 2017-03-09 NOTE — PROGRESS NOTES
Subjective:       Patient ID: Yolanda Betts is a 46 y.o. female.    Chief Complaint: Emesis and Diarrhea    HPI Comments: 46 year old female c/o N/V/D, chills, and myalgias X 1-2 days. She reports 2 episodes of vomiting today and 5-6 yesterday. She reports 8 episodes of diarrhea yesterday and none yet today. She reports intermittent diffuse abdominal discomfort but no severe or localized abd pain. She reports no fever, urinary symptoms, rash, CP, SOB, blood in stool, or other associated sxs. She has taken no medication for symptoms. She also reports having DM and has not checked her glucose recently. A1C Jan 2017 was 8.5. She reports checking her feet a couple of days ago and felt a bump between L 4th and 5th toes. She reports no known injury, drainage, redness, swelling, or pain to affected area. 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 She reports she did not get her flu shot.    Past Medical History:  No date: Anemia  No date: Anxiety  10/11/2016: Asthma  No date: Bipolar 1 disorder  No date: Diabetes mellitus, type 2  No date: Diabetic neuropathy  No date: GERD (gastroesophageal reflux disease)  No date: Hyperlipidemia  No date: Hypertension  No date: Migraine  11/1/2016: Obesity        Emesis    Associated symptoms include abdominal pain, chills and diarrhea. Pertinent negatives include no chest pain, coughing, dizziness, fever or headaches.   Diarrhea    Associated symptoms include abdominal pain, chills and vomiting. Pertinent negatives include no coughing, fever or headaches.     Review of Systems   Constitutional: Positive for chills. Negative for fever.   Respiratory: Negative for cough and shortness of breath.    Cardiovascular: Negative for chest pain, palpitations and leg swelling.   Gastrointestinal: Positive for abdominal pain, diarrhea, nausea and vomiting. Negative for blood in stool and constipation.   Endocrine: Negative for polydipsia and polyuria.   Skin:  Negative for rash.   Neurological: Negative for dizziness, weakness, numbness and headaches.   Psychiatric/Behavioral: Negative for confusion.       Objective:      Physical Exam   Constitutional: She is oriented to person, place, and time. She appears well-developed and well-nourished. No distress.   HENT:   Head: Normocephalic and atraumatic.   Mouth/Throat: Oropharynx is clear and moist.   Eyes: EOM are normal. No scleral icterus.   Neck: Neck supple.   Cardiovascular: Normal rate and regular rhythm.    Pulmonary/Chest: Effort normal and breath sounds normal. No respiratory distress. She has no decreased breath sounds. She has no wheezes. She has no rhonchi. She has no rales.   Abdominal: Soft. Bowel sounds are increased. There is generalized tenderness (mild). There is no rigidity, no rebound, no guarding, no CVA tenderness, no tenderness at McBurney's point and negative Dalton's sign.   Musculoskeletal: Normal range of motion. She exhibits no edema.   Neurological: She is alert and oriented to person, place, and time. No cranial nerve deficit.   Skin: Skin is warm and dry. No rash noted.   Mild maceration only between L 4th and 5th toes; no erythema, warmth, break in skin, drainage, TTP, induration, fluctuance, or crepitus   Psychiatric: She has a normal mood and affect. Her speech is normal and behavior is normal. Thought content normal.       Assessment:       1. Nausea and vomiting, intractability of vomiting not specified, unspecified vomiting type    2. Diarrhea, unspecified type    3. Tinea pedis of left foot    4. Essential hypertension    5. Type 2 diabetes mellitus with diabetic polyneuropathy, with long-term current use of insulin        Plan:         1. Rapid flu test today with review following.  2. Pt requests Phenergan for N/V, as she reports this works well for her without adverse reactions. She reports Zofran does not work for her. Phenergan PRN N/V - sedation warning discussed with pt.  3.  [place] diet as tolerated. Stay hydrated, monitor sxs, and monitor glucose.   4. Clotrimazole cream as directed between toes. Monitor for bacterial infection. Check feet daily. F/u with PCP or podiatry if toe sxs do not resolve within 2 weeks, sooner if sxs worsen.  5. F/u with PCP for health management and if current sxs persist or worsen. ER if sxs become severe.

## 2017-03-09 NOTE — PATIENT INSTRUCTIONS
"  [place] Diet  Your healthcare provider may recommend a bland diet if you have an upset stomach. It consists of foods that are mild and easy to digest. It is better to eat small frequent meals rather than 3 large meals a day.    Beverages  OK: Fruit juices, non-caffeinated teas and coffee, non-carbonated butler  Avoid: Carbonated beverage, caffeinated tea and coffee, all alcoholic beverages  Bread  OK: Refined white, wheat or rye bread, giselle or soda crackers, [place] toast, plain rolls, bagels  Avoid: Whole-grain bread  Cereal  OK: Refined cereals: cooked or ready to eat  Avoid: Whole-grain cereals and granola, or those containing bran, seeds or nuts  Desserts  OK: Peanut butter and all others except those to "avoid"  Avoid: Chocolate, cocoa, coconut, popcorn, nuts, seeds, jam, marmalade  Fruits  OK: Canned, cooked, frozen or fresh fruits without seeds or tough skin  Avoid: Olives, skin and seeds of fruit  Meats  OK: All fresh or preserved meat, fish and fowl  Avoid: Any that are prepared with those spices to "avoid"  Cheese and eggs  OK: Eggs, cottage cheese, cream cheese, other cheeses  Avoid: All cheeses made with those spices to "avoid"  Potatoes and pasta  OK: Potato, rice, macaroni, noodles, spaghetti  Avoid: None  Soups  OK: All soups without heavy seasoning  Avoid: Soups made with those spices to "avoid"  Vegetables  OK: Canned, cooked, fresh or frozen mildly flavored vegetables without seeds, skins or coarse fiber  Avoid: Vegetables prepared with those spices to "avoid"; skin and seeds of vegetables and those with coarse fiber  Spices  OK: Salt, lemon and lime juice, vinegar, all extracts, rose, cinnamon, thyme, mace, allspice, paprika  Avoid: Chili powder, cloves, pepper, seed spices, garlic, gravy pickles, highly seasoned salad dressings  Date Last Reviewed: 11/20/2015  © 3044-1639 [employer]. [address] [place] [address]. All rights reserved. This information is not intended as " a substitute for professional medical care. Always follow your healthcare professional's instructions.

## 2017-03-15 ENCOUNTER — OFFICE VISIT (OUTPATIENT)
Dept: OPHTHALMOLOGY | Facility: CLINIC | Age: 47
End: 2017-03-15
Payer: COMMERCIAL

## 2017-03-15 ENCOUNTER — OFFICE VISIT (OUTPATIENT)
Dept: INTERNAL MEDICINE | Facility: CLINIC | Age: 47
End: 2017-03-15
Payer: COMMERCIAL

## 2017-03-15 ENCOUNTER — LAB VISIT (OUTPATIENT)
Dept: LAB | Facility: HOSPITAL | Age: 47
End: 2017-03-15
Attending: FAMILY MEDICINE
Payer: COMMERCIAL

## 2017-03-15 VITALS
WEIGHT: 233.69 LBS | HEART RATE: 97 BPM | TEMPERATURE: 97 F | SYSTOLIC BLOOD PRESSURE: 136 MMHG | OXYGEN SATURATION: 98 % | HEIGHT: 56 IN | BODY MASS INDEX: 52.57 KG/M2 | DIASTOLIC BLOOD PRESSURE: 82 MMHG

## 2017-03-15 DIAGNOSIS — G43.109 MIGRAINE WITH AURA AND WITHOUT STATUS MIGRAINOSUS, NOT INTRACTABLE: Primary | ICD-10-CM

## 2017-03-15 DIAGNOSIS — E11.9 TYPE 2 DIABETES MELLITUS WITHOUT RETINOPATHY: Primary | ICD-10-CM

## 2017-03-15 DIAGNOSIS — R51.9 ACUTE NONINTRACTABLE HEADACHE, UNSPECIFIED HEADACHE TYPE: ICD-10-CM

## 2017-03-15 DIAGNOSIS — E11.42 DIABETIC POLYNEUROPATHY ASSOCIATED WITH TYPE 2 DIABETES MELLITUS: ICD-10-CM

## 2017-03-15 DIAGNOSIS — H52.4 MYOPIA WITH PRESBYOPIA, BILATERAL: ICD-10-CM

## 2017-03-15 DIAGNOSIS — G43.109 MIGRAINE WITH AURA AND WITHOUT STATUS MIGRAINOSUS, NOT INTRACTABLE: ICD-10-CM

## 2017-03-15 DIAGNOSIS — H52.13 MYOPIA WITH PRESBYOPIA, BILATERAL: ICD-10-CM

## 2017-03-15 DIAGNOSIS — I10 ESSENTIAL HYPERTENSION: ICD-10-CM

## 2017-03-15 LAB
ALBUMIN SERPL BCP-MCNC: 3.4 G/DL
ALP SERPL-CCNC: 74 U/L
ALT SERPL W/O P-5'-P-CCNC: 28 U/L
ANION GAP SERPL CALC-SCNC: 8 MMOL/L
AST SERPL-CCNC: 18 U/L
BILIRUB SERPL-MCNC: 0.2 MG/DL
BUN SERPL-MCNC: 10 MG/DL
CALCIUM SERPL-MCNC: 9.5 MG/DL
CHLORIDE SERPL-SCNC: 102 MMOL/L
CO2 SERPL-SCNC: 30 MMOL/L
CREAT SERPL-MCNC: 0.7 MG/DL
EST. GFR  (AFRICAN AMERICAN): >60 ML/MIN/1.73 M^2
EST. GFR  (NON AFRICAN AMERICAN): >60 ML/MIN/1.73 M^2
GLUCOSE SERPL-MCNC: 104 MG/DL
POTASSIUM SERPL-SCNC: 4 MMOL/L
PROT SERPL-MCNC: 7.7 G/DL
SODIUM SERPL-SCNC: 140 MMOL/L
T4 FREE SERPL-MCNC: 0.77 NG/DL
TSH SERPL DL<=0.005 MIU/L-ACNC: 1.53 UIU/ML

## 2017-03-15 PROCEDURE — 84443 ASSAY THYROID STIM HORMONE: CPT

## 2017-03-15 PROCEDURE — 84439 ASSAY OF FREE THYROXINE: CPT

## 2017-03-15 PROCEDURE — 99999 PR PBB SHADOW E&M-EST. PATIENT-LVL III: CPT | Mod: PBBFAC,,, | Performed by: FAMILY MEDICINE

## 2017-03-15 PROCEDURE — 3075F SYST BP GE 130 - 139MM HG: CPT | Mod: S$GLB,,, | Performed by: FAMILY MEDICINE

## 2017-03-15 PROCEDURE — 92014 COMPRE OPH EXAM EST PT 1/>: CPT | Mod: S$GLB,,, | Performed by: OPTOMETRIST

## 2017-03-15 PROCEDURE — 1160F RVW MEDS BY RX/DR IN RCRD: CPT | Mod: S$GLB,,, | Performed by: FAMILY MEDICINE

## 2017-03-15 PROCEDURE — 3045F PR MOST RECENT HEMOGLOBIN A1C LEVEL 7.0-9.0%: CPT | Mod: S$GLB,,, | Performed by: FAMILY MEDICINE

## 2017-03-15 PROCEDURE — 3060F POS MICROALBUMINURIA REV: CPT | Mod: S$GLB,,, | Performed by: FAMILY MEDICINE

## 2017-03-15 PROCEDURE — 3074F SYST BP LT 130 MM HG: CPT | Mod: S$GLB,,, | Performed by: OPTOMETRIST

## 2017-03-15 PROCEDURE — 3078F DIAST BP <80 MM HG: CPT | Mod: S$GLB,,, | Performed by: OPTOMETRIST

## 2017-03-15 PROCEDURE — 80053 COMPREHEN METABOLIC PANEL: CPT

## 2017-03-15 PROCEDURE — 99214 OFFICE O/P EST MOD 30 MIN: CPT | Mod: S$GLB,,, | Performed by: FAMILY MEDICINE

## 2017-03-15 PROCEDURE — 92015 DETERMINE REFRACTIVE STATE: CPT | Mod: S$GLB,,, | Performed by: OPTOMETRIST

## 2017-03-15 PROCEDURE — 3079F DIAST BP 80-89 MM HG: CPT | Mod: S$GLB,,, | Performed by: FAMILY MEDICINE

## 2017-03-15 PROCEDURE — 99999 PR PBB SHADOW E&M-EST. PATIENT-LVL I: CPT | Mod: PBBFAC,,, | Performed by: OPTOMETRIST

## 2017-03-15 PROCEDURE — 36415 COLL VENOUS BLD VENIPUNCTURE: CPT | Mod: PO

## 2017-03-15 RX ORDER — TOPIRAMATE 25 MG/1
25 TABLET ORAL 2 TIMES DAILY
Qty: 60 TABLET | Refills: 11 | Status: SHIPPED | OUTPATIENT
Start: 2017-03-15 | End: 2017-12-21

## 2017-03-15 RX ORDER — HYDRALAZINE HYDROCHLORIDE 10 MG/1
10 TABLET, FILM COATED ORAL 3 TIMES DAILY
Qty: 90 TABLET | Refills: 0 | Status: SHIPPED | OUTPATIENT
Start: 2017-03-15 | End: 2017-05-06 | Stop reason: SDUPTHER

## 2017-03-15 RX ORDER — RIZATRIPTAN BENZOATE 10 MG/1
10 TABLET ORAL ONCE AS NEEDED
Qty: 6 TABLET | Refills: 0 | Status: ON HOLD | OUTPATIENT
Start: 2017-03-15 | End: 2018-02-28 | Stop reason: CLARIF

## 2017-03-15 NOTE — MR AVS SNAPSHOT
[facility] Internal Medicine  [address]  [place] [address]  Phone: [phone]  Fax: [phone]                  Yolanda Betts   3/15/2017 12:40 PM   Office Visit    Description:  Female : 1970   Provider:  Shadi Heaton MD   Department:  [facility] - Internal Medicine           Reason for Visit     Headache     Dizziness           Diagnoses this Visit        Comments    Migraine with aura and without status migrainosus, not intractable    -  Primary Will increase her topamax to 75 mg bid but do it over 7 to 10 days. maxalt for headache.     Essential hypertension     Will bring on hydralazine 10 mg tid.    Diabetic polyneuropathy associated with type 2 diabetes mellitus     Will send pt up to DM education.            To Do List           Future Appointments        Provider Department Dept Phone    3/24/2017 2:00 PM Tona Kessler MD [facility] - Rheumatology [phone]    2017 3:40 PM Vish Stafford MD [facility] Internal Medicine [phone]    2017 8:40 AM PULMONARY LAB, [facility]- Pulmonary Function S[facility]cs [phone]    2017 9:20 AM Sylvain Rainey MD [facility] Pulmonary Services [phone]      Goals (5 Years of Data)     None       These Medications        Disp Refills Start End    rizatriptan (MAXALT) 10 MG tablet 6 tablet 0 3/15/2017 3/15/2017    Take 1 tablet (10 mg total) by mouth once as needed for Migraine. - Oral    Pharmacy: [facility]/pharmacy #[id number] - [place] - [address] AT [facility] [facility] Ph #: [phone]       topiramate (TOPAMAX) 25 MG tablet 60 tablet 11 3/15/2017 3/15/2018    Take 1 tablet (25 mg total) by mouth 2 (two) times daily. - Oral    Pharmacy: [facility]/pharmacy #[id number] - [place] - [address] AT [facility] [address] Ph #: [phone]       hydrALAZINE (APRESOLINE) 10 MG tablet 90 tablet 0 3/15/2017 3/15/2018    Take 1 tablet (10 mg total) by mouth 3 (three) times daily. - Oral     Pharmacy: [facility]/pharmacy #[id number] - [place] - [address] AT [facility] Ph #: [phone]         [facility] On Call     [facility] On Call Nurse Care Line - 24/7 Assistance  Registered nurses in the [facility] On Call Center provide clinical advisement, health education, appointment booking, and other advisory services.  Call for this free service at [phone].             Medications           Message regarding Medications     Verify the changes and/or additions to your medication regime listed below are the same as discussed with your clinician today.  If any of these changes or additions are incorrect, please notify your healthcare provider.        START taking these NEW medications        Refills    rizatriptan (MAXALT) 10 MG tablet 0    Sig: Take 1 tablet (10 mg total) by mouth once as needed for Migraine.    Class: Normal    Route: Oral    topiramate (TOPAMAX) 25 MG tablet 11    Sig: Take 1 tablet (25 mg total) by mouth 2 (two) times daily.    Class: Normal    Route: Oral    hydrALAZINE (APRESOLINE) 10 MG tablet 0    Sig: Take 1 tablet (10 mg total) by mouth 3 (three) times daily.    Class: Normal    Route: Oral           Verify that the below list of medications is an accurate representation of the medications you are currently taking.  If none reported, the list may be blank. If incorrect, please contact your healthcare provider. Carry this list with you in case of emergency.           Current Medications     albuterol (PROAIR HFA) 90 mcg/actuation inhaler Inhale 2 puffs into the lungs every 4 (four) hours as needed for Wheezing or Shortness of Breath.    albuterol (PROVENTIL) 2.5 mg /3 mL (0.083 %) nebulizer solution Take 3 mLs (2.5 mg total) by nebulization every 4 (four) hours.    alprazolam (XANAX) 1 MG tablet Take 1 mg by mouth 3 (three) times daily as needed.    aripiprazole (ABILIFY) 20 MG Tab Take 20 mg by mouth once daily.    aspirin (ECOTRIN) 81 MG EC tablet Take 81  "mg by mouth once daily.    BD INSULIN SYRINGE ULTRA-FINE 1/2 mL 30 gauge x 1/2" Syrg     blood-glucose meter (FREESTYLE SYSTEM KIT) kit Dispense glucometer approved by patients insurance    clotrimazole (LOTRIMIN) 1 % cream Apply topically 2 (two) times daily. Between toes X 2 weeks    duloxetine (CYMBALTA) 30 MG capsule Take 2 capsules (60 mg total) by mouth 2 (two) times daily.    ergocalciferol (ERGOCALCIFEROL) 50,000 unit Cap Take 1 capsule (50,000 Units total) by mouth every 7 days.    fenofibrate (TRICOR) 48 MG tablet Take 1 tablet (48 mg total) by mouth once daily.    fluoxetine (PROZAC) 20 MG capsule Take 20 mg by mouth once daily.    fluticasone (FLONASE) 50 mcg/actuation nasal spray 1 spray by Each Nare route once daily.    fluticasone-salmeterol 250-50 mcg/dose (ADVAIR) 250-50 mcg/dose diskus inhaler Inhale 1 puff into the lungs 2 (two) times daily.    gabapentin (NEURONTIN) 300 MG capsule Take 1 capsule (300 mg total) by mouth 3 (three) times daily. Take 1 cap qhs for 1 wk, then 2 caps qhs x 1 wk, then 3 caps qhs thereafter    hydrALAZINE (APRESOLINE) 10 MG tablet Take 1 tablet (10 mg total) by mouth 3 (three) times daily.    insulin aspart (NOVOLOG FLEXPEN) 100 unit/mL InPn pen Inject 16 Units into the skin 3 (three) times daily with meals.    insulin detemir (LEVEMIR FLEXPEN) 100 unit/mL (3 mL) SubQ InPn pen Inject 26 Units into the skin 2 (two) times daily.    LINZESS 145 mcg Cap capsule Take 145 mcg by mouth once daily.    metformin (GLUCOPHAGE) 1000 MG tablet Take 1 tablet (1,000 mg total) by mouth 2 (two) times daily.    naproxen sodium (ANAPROX) 550 MG tablet Take 1 tablet (550 mg total) by mouth 2 (two) times daily with meals.    nebulizer and compressor Mariela 1 Device by Misc.(Non-Drug; Combo Route) route every 4 (four) hours as needed.    pravastatin (PRAVACHOL) 80 MG tablet TAKE 1 TABLET BY MOUTH ONCE DAILY    promethazine (PHENERGAN) 25 MG tablet Take 1 tablet (25 mg total) by mouth every 4 to " "6 hours as needed for Nausea. sedating    RESTASIS 0.05 % ophthalmic emulsion Place 1 drop into both eyes 2 (two) times daily.    rizatriptan (MAXALT) 10 MG tablet Take 1 tablet (10 mg total) by mouth once as needed for Migraine.    topiramate (TOPAMAX) 25 MG tablet Take 1 tablet (25 mg total) by mouth 2 (two) times daily.    topiramate (TOPAMAX) 50 MG tablet Take 1 tablet (50 mg total) by mouth 2 (two) times daily.    valsartan-hydrochlorothiazide (DIOVAN-HCT) 320-25 mg per tablet Take 1 tablet by mouth once daily.    verapamil (VERELAN) 180 MG C24P Take 1 capsule (180 mg total) by mouth once daily.    zolpidem (AMBIEN) 10 mg Tab Take 10 mg by mouth every evening.           Clinical Reference Information           Your Vitals Were     BP Pulse Temp Height Weight SpO2    136/82 (BP Location: Right arm, Patient Position: Sitting, BP Method: Manual) 97 97.2 °F (36.2 °C) (Tympanic) 4' 8" (1.422 m) 106 kg (233 lb 11 oz) 98%    BMI                52.39 kg/m2          Blood Pressure          Most Recent Value    BP  136/82      Allergies as of 3/15/2017     Lortab [Hydrocodone-acetaminophen]    Neuromuscular Blockers, Steroidal    Morphine    Seconal [Secobarbital Sodium]    Tylox [Oxycodone-acetaminophen]      Immunizations Administered on Date of Encounter - 3/15/2017     None      Orders Placed During Today's Visit      Normal Orders This Visit    Ambulatory Referral to Diabetes Education     Future Labs/Procedures Expected by Expires    Comprehensive metabolic panel  3/15/2017 5/14/2018    T4, free  3/15/2017 5/14/2018    TSH  3/15/2017 5/14/2018      Instructions    Hydralazine:      Take 1 tablet if you top number is greater than 160 or bottom is greater than 100. Repeat your blood pressure check in about 2 hours.       Maxalt:       1 tablet than 2 hours later 1 tab or 1/2 tablet and repeat the other 1/2 in 2 hours.        Language Assistance Services     ATTENTION: Language assistance services are available, free " of charge. Please call [phone].      ATENCIÓN: Si habla español, tiene a presley disposición servicios gratuitos de asistencia lingüística. Llame al [phone].     CHÚ Ý: N?u b?n nói Ti?ng Vi?t, có các d?ch v? h? tr? ngôn ng? mi?n phí dành cho b?n. G?i s? [phone].         [facility] complies with applicable Federal civil rights laws and does not discriminate on the basis of race, color, national origin, age, disability, or sex.

## 2017-03-15 NOTE — PROGRESS NOTES
Subjective:       Patient ID: Yolanda Betts is a 46 y.o. female.    Chief Complaint: Headache (started today) and Dizziness    HPI Comments: Blurry Vision:       Pt reports she has blurry vision and was just seen by optometry. Pt initially wanted to be seen due to elevated BP at work but her BP now is fine. Pt reports blurry vision just today. Pt reports that nausea vomiting for a few days ago has resolved. Pt has since in the last 24 hours however exact time is unclear has had blurry vision, headache. Pt reports that her blood pressure has ran high at times during the night on a chronic basis. Pt reports that she does have a history of migraines and is on Topamax 50 mg bid. Pt denied any CP or SOB    Headache    This is a new problem. Associated symptoms include dizziness.   Dizziness:    Associated symptoms: headaches.    Review of Systems   Respiratory: Negative.    Cardiovascular: Negative.    Gastrointestinal: Negative.    Genitourinary: Negative.    Musculoskeletal: Negative.    Neurological: Positive for dizziness and headaches.   Psychiatric/Behavioral: Negative.        Objective:      Physical Exam   Constitutional: She appears well-developed and well-nourished.   Cardiovascular: Normal rate and regular rhythm.    Pulmonary/Chest: Effort normal and breath sounds normal.   Skin: Skin is warm and dry.       Assessment:       1. Migraine with aura and without status migrainosus, not intractable    2. Essential hypertension    3. Diabetic polyneuropathy associated with type 2 diabetes mellitus        Plan:       Migraine with aura and without status migrainosus, not intractable  Comments:  Will increase her topamax to 75 mg bid but do it over 7 to 10 days. maxalt for headache.   Orders:  -     Comprehensive metabolic panel; Future; Expected date: 3/15/17  -     T4, free; Future; Expected date: 3/15/17  -     TSH; Future; Expected date: 3/15/17    Essential hypertension  Comments:  Will bring on hydralazine 10 mg  tid.    Diabetic polyneuropathy associated with type 2 diabetes mellitus  Comments:  Will send pt up to DM education.   Orders:  -     Ambulatory Referral to Diabetes Education    Other orders  -     rizatriptan (MAXALT) 10 MG tablet; Take 1 tablet (10 mg total) by mouth once as needed for Migraine.  Dispense: 6 tablet; Refill: 0  -     topiramate (TOPAMAX) 25 MG tablet; Take 1 tablet (25 mg total) by mouth 2 (two) times daily.  Dispense: 60 tablet; Refill: 11  -     hydrALAZINE (APRESOLINE) 10 MG tablet; Take 1 tablet (10 mg total) by mouth 3 (three) times daily.  Dispense: 90 tablet; Refill: 0

## 2017-03-15 NOTE — PATIENT INSTRUCTIONS
Hydralazine:      Take 1 tablet if you top number is greater than 160 or bottom is greater than 100. Repeat your blood pressure check in about 2 hours.       Maxalt:       1 tablet than 2 hours later 1 tab or 1/2 tablet and repeat the other 1/2 in 2 hours.

## 2017-03-15 NOTE — PROGRESS NOTES
HPI     Pt states that when she woke up this morning, she couldn't focus on   anything, like she has a film over her eyes. She states that she is also   seeing double. She has a headache that is an 8 on the pain scale. Pt   states that her blood pressure was high today.   Diagnosed with diabetes in 2004  Recent vision fluctuations Blurred VA today  Blood sugar: 143. Pt states that her blood sugar normally fluctuates    Any vision changes since last exam: Pt states that her vision has been   getting worse since her last appt before today.  Eye pain: None  Other ocular symptoms: Blurred VA, headache    Do you wear currently wear glasses or contacts? Glasses    Interested in contacts today? No    Do you plan on getting new glasses today? If needed       Last edited by Patrice Vaughan, Patient Care Assistant on 3/15/2017 11:16   AM.         Assessment /Plan     For exam results, see Encounter Report.    Type 2 diabetes mellitus without retinopathy  No diabetic retinopathy was seen in either eye today. Reviewed importance of yearly dilated eye exams. Continue close care with PCP regarding diabetes.    Myopia with presbyopia, bilateral  Large shift in refractive error today  Warned pt that shift is likely due to bs changes, recently pt states was around 400 last week but this morning was 143  Pt understands that vision change could be temporary, but insists on having spec rx today        Eyeglass Final Rx      Sphere Cylinder Axis Add   Right -2.75 +2.00 105 +1.75   Left -4.00 +1.00 075 +1.75       Expiration Date:  3/16/2018              Will f/u via [facility] [employer] in 1 wk    Acute nonintractable headache, unspecified headache type  Headache not likely ocular in origin   Refer to Primary care today for eval    RTC 1 yr for dilated eye exam or PRN if any problems.   Discussed above and answered questions.

## 2017-03-16 ENCOUNTER — TELEPHONE (OUTPATIENT)
Dept: INTERNAL MEDICINE | Facility: CLINIC | Age: 47
End: 2017-03-16

## 2017-03-16 ENCOUNTER — NURSE TRIAGE (OUTPATIENT)
Dept: ADMINISTRATIVE | Facility: CLINIC | Age: 47
End: 2017-03-16

## 2017-03-16 ENCOUNTER — HOSPITAL ENCOUNTER (EMERGENCY)
Facility: HOSPITAL | Age: 47
Discharge: HOME | End: 2017-03-17
Attending: EMERGENCY MEDICINE
Payer: COMMERCIAL

## 2017-03-16 DIAGNOSIS — R60.9 DEPENDENT EDEMA: ICD-10-CM

## 2017-03-16 DIAGNOSIS — M25.551 RIGHT HIP PAIN: Primary | ICD-10-CM

## 2017-03-16 PROCEDURE — 99283 EMERGENCY DEPT VISIT LOW MDM: CPT

## 2017-03-16 NOTE — TELEPHONE ENCOUNTER
----- Message from Santiago Denney sent at 3/16/2017 10:50 AM CDT -----  Contact: Patient  Pt needs a call back regarding test results. Please call pt back [phone]. Thank you/NH

## 2017-03-16 NOTE — ED AVS SNAPSHOT
[facility]  [address] [place] [id number]               Yolanda Betts   3/16/2017 10:35 PM   ED    Description:  Female : 1970   Department:  [facility]           Your Care was Coordinated By:     Provider Role From To    Robert Segundo MD Attending Provider 17 [id number] --      Reason for Visit     Leg Swelling           Diagnoses this Visit        Comments    Right hip pain    -  Primary     Dependent edema           ED Disposition     None           To Do List           Follow-up Information     Follow up with Vish Stafford MD In 2 days.    Specialty:  Internal Medicine    Contact information:    [address]  [facility] [address]  [phone]          Follow up with [facility].    Specialty:  Emergency Medicine    Why:  If symptoms worsen    Contact information:    [address] [place] [id number] [phone]       These Medications        Disp Refills Start End    tramadol (ULTRAM) 50 mg tablet 12 tablet 0 3/17/2017 3/27/2017    Take 1 tablet (50 mg total) by mouth every 6 (six) hours as needed for Pain. - Oral    Pharmacy: [id number] - [place], [place] - [address] [facility] Ph #: [phone]         [facility]     [facility] -  Assistance  Registered nurses in the [facility] provide clinical advisement, health education, appointment booking, and other advisory services.  Call for this free service at [phone].             Medications           Message regarding Medications     Verify the changes and/or additions to your medication regime listed below are the same as discussed with your clinician today.  If any of these changes or additions are incorrect, please notify your healthcare provider.        START taking these NEW medications        Refills    tramadol (ULTRAM) 50 mg tablet 0  "   Sig: Take 1 tablet (50 mg total) by mouth every 6 (six) hours as needed for Pain.    Class: Print    Route: Oral           Verify that the below list of medications is an accurate representation of the medications you are currently taking.  If none reported, the list may be blank. If incorrect, please contact your healthcare provider. Carry this list with you in case of emergency.           Current Medications     albuterol (PROAIR HFA) 90 mcg/actuation inhaler Inhale 2 puffs into the lungs every 4 (four) hours as needed for Wheezing or Shortness of Breath.    albuterol (PROVENTIL) 2.5 mg /3 mL (0.083 %) nebulizer solution Take 3 mLs (2.5 mg total) by nebulization every 4 (four) hours.    alprazolam (XANAX) 1 MG tablet Take 1 mg by mouth 3 (three) times daily as needed.    aripiprazole (ABILIFY) 20 MG Tab Take 20 mg by mouth once daily.    aspirin (ECOTRIN) 81 MG EC tablet Take 81 mg by mouth once daily.    BD INSULIN SYRINGE ULTRA-FINE 1/2 mL 30 gauge x 1/2" Syrg     blood-glucose meter (FREESTYLE SYSTEM KIT) kit Dispense glucometer approved by patients insurance    clotrimazole (LOTRIMIN) 1 % cream Apply topically 2 (two) times daily. Between toes X 2 weeks    duloxetine (CYMBALTA) 30 MG capsule Take 2 capsules (60 mg total) by mouth 2 (two) times daily.    ergocalciferol (ERGOCALCIFEROL) 50,000 unit Cap Take 1 capsule (50,000 Units total) by mouth every 7 days.    fenofibrate (TRICOR) 48 MG tablet Take 1 tablet (48 mg total) by mouth once daily.    fluoxetine (PROZAC) 20 MG capsule Take 20 mg by mouth once daily.    fluticasone (FLONASE) 50 mcg/actuation nasal spray 1 spray by Each Nare route once daily.    fluticasone-salmeterol 250-50 mcg/dose (ADVAIR) 250-50 mcg/dose diskus inhaler Inhale 1 puff into the lungs 2 (two) times daily.    gabapentin (NEURONTIN) 300 MG capsule Take 1 capsule (300 mg total) by mouth 3 (three) times daily. Take 1 cap qhs for 1 wk, then 2 caps qhs x 1 wk, then 3 caps qhs thereafter " "   hydrALAZINE (APRESOLINE) 10 MG tablet Take 1 tablet (10 mg total) by mouth 3 (three) times daily.    insulin aspart (NOVOLOG FLEXPEN) 100 unit/mL InPn pen Inject 16 Units into the skin 3 (three) times daily with meals.    insulin detemir (LEVEMIR FLEXPEN) 100 unit/mL (3 mL) SubQ InPn pen Inject 26 Units into the skin 2 (two) times daily.    LINZESS 145 mcg Cap capsule Take 145 mcg by mouth once daily.    metformin (GLUCOPHAGE) 1000 MG tablet Take 1 tablet (1,000 mg total) by mouth 2 (two) times daily.    naproxen sodium (ANAPROX) 550 MG tablet Take 1 tablet (550 mg total) by mouth 2 (two) times daily with meals.    nebulizer and compressor Mariela 1 Device by Misc.(Non-Drug; Combo Route) route every 4 (four) hours as needed.    pravastatin (PRAVACHOL) 80 MG tablet TAKE 1 TABLET BY MOUTH ONCE DAILY    promethazine (PHENERGAN) 25 MG tablet Take 1 tablet (25 mg total) by mouth every 4 to 6 hours as needed for Nausea. sedating    RESTASIS 0.05 % ophthalmic emulsion Place 1 drop into both eyes 2 (two) times daily.    rizatriptan (MAXALT) 10 MG tablet Take 1 tablet (10 mg total) by mouth once as needed for Migraine.    topiramate (TOPAMAX) 25 MG tablet Take 1 tablet (25 mg total) by mouth 2 (two) times daily.    topiramate (TOPAMAX) 50 MG tablet Take 1 tablet (50 mg total) by mouth 2 (two) times daily.    tramadol (ULTRAM) 50 mg tablet Take 1 tablet (50 mg total) by mouth every 6 (six) hours as needed for Pain.    valsartan-hydrochlorothiazide (DIOVAN-HCT) 320-25 mg per tablet Take 1 tablet by mouth once daily.    verapamil (VERELAN) 180 MG C24P Take 1 capsule (180 mg total) by mouth once daily.    zolpidem (AMBIEN) 10 mg Tab Take 10 mg by mouth every evening.           Clinical Reference Information           Your Vitals Were     BP Pulse Temp Resp Height Weight    173/89 (BP Location: Right arm, Patient Position: Sitting) 92 98.1 °F (36.7 °C) (Oral) 18 4' 8" (1.422 m) 105.7 kg (233 lb)    SpO2 BMI             96% 52.24 " kg/m2         Allergies as of 3/17/2017        Reactions    Lortab [Hydrocodone-acetaminophen] Itching    Neuromuscular Blockers, Steroidal     some    Morphine Rash    itching    Seconal [Secobarbital Sodium] Rash    itching    Tylox [Oxycodone-acetaminophen] Rash      Immunizations Administered on Date of Encounter - 3/17/2017     None      ED Micro, Lab, POCT     None      ED Imaging Orders     Start Ordered       Status Ordering Provider    03/16/17 2305 03/16/17 2305  X-Ray Hip 2 View Right  1 time imaging      Final result         Discharge Instructions         Leg Swelling in Both Legs    Swelling of the feet, ankles, and legs is called edema. It is caused by excess fluid that has collected in the tissues. Extra fluid in the body settles in the lowest part because of gravity. This is why the legs and feet are most affected.  Some of the causes for edema include:  · Disease of the heart like congestive heart failure  · Standing or sitting for long periods of time  · Infection of the feet or legs  · Blood pooling in the veins of your legs (venous insufficiency)  · Dilated veins in your lower leg (varicose veins)  · Garters or other clothing that is tight on your legs. This will cause blood to pool in your legs because the clothing limits blood flow.  · Some medicines such as hormones like birth control pills, some blood pressure medicines like calcium channel blockers (amlodipine) and steroids, some antidepressants like MAO inhibitors and tricyclics  · Menstrual periods that cause you to retain fluids  · Many types of renal disease  · Liver failure or cirrhosis  · Pregnancy, some swelling is normal, but a sudden increase in leg swelling or weight gain can be a sign of a dangerous complication of pregnancy  · Poor nutrition  · Thyroid disease  Medical treatment will depend on what is causing the swelling in your legs. Your healthcare provider may prescribe water pills (diuretics) to get rid of the extra  fluid.  Home care  Follow these guidelines when caring for yourself at home:  · Don't wear clothing like garters that is tight on your legs.  · Keep your legs up while lying or sitting.  · If infection, injury, or recent surgery is causing the swelling, stay off your legs as much as possible until symptoms get better.  · If your healthcare provider says that your leg swelling is caused by venous insufficiency or varicose veins, don't sit or  one place for long periods of time. Take breaks and walk about every few hours. Brisk walking is a good exercise. It helps circulate the blood that has collected in your leg. Talk with your provider about using support stockings to stop daytime leg swelling.  · If your provider says that heart disease is causing your leg swelling, follow a low-salt diet to stop extra fluid from staying in your body. You may also need medicine.  Follow-up care  Follow up with your healthcare provider, or as advised.  When to seek medical advice  Call your healthcare provider right away if any of these occur:  · New shortness of breath or chest pain  · Shortness of breath or chest pain that gets worse  · Swelling in both legs or ankles that gets worse  · Swelling of the abdomen  · Redness, warmth, or swelling in one leg  · Fever of 100.4ºF (38ºC) or higher, or as directed by your healthcare provider  · Yellow color to your skin or eyes  · Rapid, unexplained weight gain  · Having to sleep upright or use an increased number of pillows  Date Last Reviewed: 3/31/2016  © 0288-6071 [employer], [employer]. [address] [place] [address], [place] [address]. All rights reserved. This information is not intended as a substitute for professional medical care. Always follow your healthcare professional's instructions.          Your Scheduled Appointments     Mar 24, 2017  1:00 PM CDT   Diabetes Education with Hilary Barney RD, CDE   [facility]    [address]  [id number]   [phone]            Mar 24, 2017  2:00 PM CDT   Consult with Tona Kessler MD   [facility] ([facility])    [address] [place] [address]   [phone]            May 02, 2017  3:40 PM CDT   Established Patient Visit with Vish Stafford MD   [facility] - Internal Medicine ([facility])    [address] [facility] [address]  [place] [address]   [phone]            Jul 17, 2017  8:40 AM CDT   Complete PFT with PULMONARY LAB, [facility] ([facility])    [address]  [place] [address]   [phone]            Jul 17, 2017  9:20 AM CDT   Established Patient Visit with Sylvain Rainey MD   [facility] - Pulmonary Services ([facility])    [address] [facility] [address]  [place] [address]   [phone]               [facility] complies with applicable Federal civil rights laws and does not discriminate on the basis of race, color, national origin, age, disability, or sex.        Language Assistance Services     ATTENTION: Language assistance services are available, free of charge. Please call [phone].      ATENCIÓN: Si habla español, tiene a presley disposición servicios gratuitos de asistencia lingüística. Llame al [phone].     [facility] Ý: N?u b?n nói Ti?ng Vi?t, có các d?ch v? h? tr? ngôn ng? mi?n phí dành cho b?n. G?i s? [phone].

## 2017-03-17 ENCOUNTER — HOSPITAL ENCOUNTER (OUTPATIENT)
Dept: RADIOLOGY | Facility: HOSPITAL | Age: 47
Discharge: HOME | End: 2017-03-17
Attending: INTERNAL MEDICINE
Payer: COMMERCIAL

## 2017-03-17 ENCOUNTER — OFFICE VISIT (OUTPATIENT)
Dept: INTERNAL MEDICINE | Facility: CLINIC | Age: 47
End: 2017-03-17
Payer: COMMERCIAL

## 2017-03-17 ENCOUNTER — OFFICE VISIT (OUTPATIENT)
Dept: ORTHOPEDICS | Facility: CLINIC | Age: 47
End: 2017-03-17
Payer: COMMERCIAL

## 2017-03-17 VITALS
BODY MASS INDEX: 52.28 KG/M2 | DIASTOLIC BLOOD PRESSURE: 95 MMHG | HEIGHT: 56 IN | WEIGHT: 232.38 LBS | SYSTOLIC BLOOD PRESSURE: 149 MMHG | HEART RATE: 91 BPM

## 2017-03-17 VITALS
HEIGHT: 56 IN | OXYGEN SATURATION: 98 % | BODY MASS INDEX: 52.51 KG/M2 | TEMPERATURE: 97 F | HEART RATE: 102 BPM | SYSTOLIC BLOOD PRESSURE: 130 MMHG | WEIGHT: 233.44 LBS | DIASTOLIC BLOOD PRESSURE: 78 MMHG

## 2017-03-17 VITALS
HEART RATE: 89 BPM | RESPIRATION RATE: 18 BRPM | TEMPERATURE: 98 F | HEIGHT: 56 IN | OXYGEN SATURATION: 99 % | DIASTOLIC BLOOD PRESSURE: 81 MMHG | BODY MASS INDEX: 52.42 KG/M2 | WEIGHT: 233 LBS | SYSTOLIC BLOOD PRESSURE: 142 MMHG

## 2017-03-17 DIAGNOSIS — R14.0 ABDOMINAL DISTENSION: ICD-10-CM

## 2017-03-17 DIAGNOSIS — I10 ESSENTIAL HYPERTENSION: ICD-10-CM

## 2017-03-17 DIAGNOSIS — R60.9 EDEMA, UNSPECIFIED TYPE: ICD-10-CM

## 2017-03-17 DIAGNOSIS — R60.9 EDEMA, UNSPECIFIED TYPE: Primary | ICD-10-CM

## 2017-03-17 DIAGNOSIS — M70.61 TROCHANTERIC BURSITIS, RIGHT HIP: Primary | ICD-10-CM

## 2017-03-17 DIAGNOSIS — R06.02 SOB (SHORTNESS OF BREATH): ICD-10-CM

## 2017-03-17 DIAGNOSIS — J45.20 MILD INTERMITTENT ASTHMA WITHOUT COMPLICATION: ICD-10-CM

## 2017-03-17 DIAGNOSIS — F31.9 BIPOLAR 1 DISORDER: ICD-10-CM

## 2017-03-17 DIAGNOSIS — E11.42 DIABETIC POLYNEUROPATHY ASSOCIATED WITH TYPE 2 DIABETES MELLITUS: ICD-10-CM

## 2017-03-17 DIAGNOSIS — M54.50 LUMBOSACRAL PAIN: ICD-10-CM

## 2017-03-17 DIAGNOSIS — R63.5 WEIGHT GAIN: ICD-10-CM

## 2017-03-17 DIAGNOSIS — D64.9 ANEMIA, UNSPECIFIED TYPE: Primary | ICD-10-CM

## 2017-03-17 PROCEDURE — 99214 OFFICE O/P EST MOD 30 MIN: CPT | Mod: S$GLB,,, | Performed by: PHYSICIAN ASSISTANT

## 2017-03-17 PROCEDURE — 1160F RVW MEDS BY RX/DR IN RCRD: CPT | Mod: S$GLB,,, | Performed by: PHYSICIAN ASSISTANT

## 2017-03-17 PROCEDURE — 99999 PR PBB SHADOW E&M-EST. PATIENT-LVL V: CPT | Mod: PBBFAC,,, | Performed by: PHYSICIAN ASSISTANT

## 2017-03-17 PROCEDURE — 71020 XR CHEST PA AND LATERAL: CPT | Mod: TC,PO

## 2017-03-17 PROCEDURE — 3075F SYST BP GE 130 - 139MM HG: CPT | Mod: S$GLB,,, | Performed by: PHYSICIAN ASSISTANT

## 2017-03-17 PROCEDURE — 3074F SYST BP LT 130 MM HG: CPT | Mod: S$GLB,,, | Performed by: PHYSICIAN ASSISTANT

## 2017-03-17 PROCEDURE — 93970 EXTREMITY STUDY: CPT | Mod: 26,,, | Performed by: RADIOLOGY

## 2017-03-17 PROCEDURE — 3078F DIAST BP <80 MM HG: CPT | Mod: S$GLB,,, | Performed by: PHYSICIAN ASSISTANT

## 2017-03-17 PROCEDURE — 3045F PR MOST RECENT HEMOGLOBIN A1C LEVEL 7.0-9.0%: CPT | Mod: S$GLB,,, | Performed by: PHYSICIAN ASSISTANT

## 2017-03-17 PROCEDURE — 71020 XR CHEST PA AND LATERAL: CPT | Mod: 26,,, | Performed by: RADIOLOGY

## 2017-03-17 PROCEDURE — 93970 EXTREMITY STUDY: CPT | Mod: TC,PO

## 2017-03-17 PROCEDURE — 3060F POS MICROALBUMINURIA REV: CPT | Mod: S$GLB,,, | Performed by: PHYSICIAN ASSISTANT

## 2017-03-17 RX ORDER — MELOXICAM 15 MG/1
15 TABLET ORAL DAILY
Qty: 21 TABLET | Refills: 0 | Status: SHIPPED | OUTPATIENT
Start: 2017-03-17 | End: 2017-07-17

## 2017-03-17 RX ORDER — GABAPENTIN 300 MG/1
300 CAPSULE ORAL 3 TIMES DAILY
Qty: 90 CAPSULE | Refills: 0 | Status: SHIPPED | OUTPATIENT
Start: 2017-03-17 | End: 2017-04-12 | Stop reason: SDUPTHER

## 2017-03-17 RX ORDER — TRAMADOL HYDROCHLORIDE 50 MG/1
50 TABLET ORAL EVERY 6 HOURS PRN
Qty: 12 TABLET | Refills: 0 | Status: SHIPPED | OUTPATIENT
Start: 2017-03-17 | End: 2017-03-27

## 2017-03-17 NOTE — PROGRESS NOTES
Subjective:      Patient ID: Yolanda Betts is a 46 y.o. female.    Chief Complaint: Pain of the Right Hip      HPI: Yolanda Betts  is a 46 y.o. female who c/o Pain of the Right Hip   for duration of about 3 months.  Pain level is 7 out of 10 in severity.  She denies an inciting injury.  She complains of a dull sharp constant pain.  She points to the right buttock the lateral hip as well as into the groin as to where the pain is located.  Dr. Moran in the past.  He felt that she had some fibromyalgia.  He put her on gabapentin but she states it's not really helping her at this time.  Pain is worsened with walking.  She denies numbness and tingling.    Review of Systems   Constitution: Negative for fever.   HENT: Negative for headaches.    Cardiovascular: Negative for chest pain.   Respiratory: Negative for cough and shortness of breath.    Skin: Negative for rash.   Musculoskeletal: Positive for joint pain and stiffness. Negative for joint swelling.   Gastrointestinal: Negative for heartburn.   Neurological: Negative for numbness.         Objective:        General    Nursing note and vitals reviewed.  Constitutional: She is oriented to person, place, and time. She appears well-developed and well-nourished.   HENT:   Head: Normocephalic and atraumatic.   Eyes: EOM are normal.   Cardiovascular: Normal rate and regular rhythm.    Pulmonary/Chest: Effort normal.   Abdominal: Soft.   Neurological: She is alert and oriented to person, place, and time.   Psychiatric: She has a normal mood and affect. Her behavior is normal.             Right Hip Exam     Inspection   Scars: absent  Swelling: absent  Bruising: absent  No deformity of hip.  Quadriceps Atrophy:  Negative  Erythema: absent    Tenderness   The patient tender to palpation of the trochanteric bursa and SI joint.    Range of Motion   Extension: normal   Flexion: normal   Internal Rotation: normal   External Rotation: normal   Abduction: normal   Adduction: normal      Tests   Pain w/ forced internal rotation (LONG): present  Pain w/ forced external rotation (FADIR): absent  Log Roll: negative    Other   Sensation: normal  Left Hip Exam     Tenderness   The patient tender to palpation of the trochanteric bursa.          Muscle Strength   Right Lower Extremity   Hip Abduction: 5/5   Hip Adduction: 5/5   Hip Flexion: 5/5   Ankle Dorsiflexion:  5/5     Reflexes     Right Side   Quadriceps:  2+  Achilles:  2+    Vascular Exam     Right Pulses  Dorsalis Pedis:      2+          Edema  Right Upper Leg: absent            Xray:   Right hip from today images and report were reviewed today.  I agree with the radiologist's interpretation.  Negative for fracture or dislocation.  No significant arthritic changes.  Probable linear area of ligamentous ossification just medial to the right acetabulum.    Lumbar spine from 1/20/17 images and report were reviewed today.  I agree with the radiologist's interpretation.  The vertebral bodies demonstrate normal height.  The alignment is within normal limits. The disk space heights are maintained. Prominent bilateral facet arthropathy noted at the L5-S1 level with more mild facet arthropathy noted at L4-L5 level bilaterally. No pars defects.    MRI:   Lumbar spine from 1/26/17 images and report were reviewed today.  I agree with the radiologist's interpretation.  Degenerative changes L5-S1. Otherwise unremarkable exam.    Assessment:       Encounter Diagnoses   Name Primary?    Trochanteric bursitis, right hip Yes    Lumbosacral pain           Plan:       Yolanda was seen today for pain.    Diagnoses and all orders for this visit:    Trochanteric bursitis, right hip  -     Ambulatory Referral to Physical/Occupational Therapy  -     meloxicam (MOBIC) 15 MG tablet; Take 1 tablet (15 mg total) by mouth once daily. Take with food    Lumbosacral pain  -     Ambulatory Referral to Physical/Occupational Therapy  -     meloxicam (MOBIC) 15 MG tablet; Take 1  tablet (15 mg total) by mouth once daily. Take with food    Yolanda him's in today for new problem of the right hip.  She is also having lumbosacral pain in the region of the SI joint.  I Do recommend that she follow up with Dr. Moran.  I don't know if she would be a candidate for an SI joint injection, but she is extremely tender in this area on the right side today.  Additionally, she is not having any pain with passive or active range of motion of the hip today.  She has a negative FADIR test.  This leads me to suspect that hip is not the true source of her pain.  I worried that her pain may be originating in the back.  However, I would like to get her started with some physical therapy.  She does have point tenderness along the bilateral trochanteric versus.  This could go along with her fibromyalgia diagnosis, but I would like to try some therapy to see if this helps.  I will put her on a prescription of meloxicam as above.  I would not want her to be on long-term and she does have some high blood pressure issues.  She can follow-up with me if she worsens or does not improve.  Otherwise, I would have her follow up with Dr. Moran's office for further intervention for the lumbosacral pain.  Patient verbalizes understanding and agrees with the above.    Return if symptoms worsen or fail to improve.          The patient understands, chooses and consents to this plan and accepts all   the risks which include but are not limited to the risks mentioned above.     Disclaimer: This note was prepared using a voice recognition system and is likely to have sound alike errors within the text.

## 2017-03-17 NOTE — TELEPHONE ENCOUNTER
----- Message from LINO Blanco sent at 3/17/2017  2:48 PM CDT -----  CXR appear clear. Blood work shows worsening anemia - would like her to see hematology, referral placed. Heart marker still pending.

## 2017-03-17 NOTE — ED PROVIDER NOTES
SCRIBE #1 NOTE: I, José Miguel Mckeon, am scribing for, and in the presence of, Robert Segundo MD. I have scribed the entire note.      History      Chief Complaint   Patient presents with    Leg Swelling     R leg swelling + pain for a few days.        Review of patient's allergies indicates:   Allergen Reactions    Lortab [hydrocodone-acetaminophen] Itching    Neuromuscular blockers, steroidal      some    Morphine Rash     itching    Seconal [secobarbital sodium] Rash     itching    Tylox [oxycodone-acetaminophen] Rash        HPI   HPI    3/16/2017, 11:02 PM   History obtained from the patient      History of Present Illness: Yolanda Betts is a 46 y.o. female patient who presents to the Emergency Department for R hip pain which onset gradually PTA. Symptoms are constant and moderate in severity. Pain radiates down RLE. No mitigating or exacerbating factors reported. No associated sxs reported. Pt also reports she had URI sxs a few weeks ago and she hasn't been moving around much, so she noticed slight increase of swelling in BLE, but URI sxs have resolved. Patient denies any recent long travel/trauma, fever, chills, CP, SOB, N/V/D, dysuria, weakness/numbness, dizziness, and all other sxs at this time. Prior Tx includes Aleve with no relief of sxs. No further complaints or concerns at this time.       Arrival mode: Personal vehicle     PCP: Vish Stafford MD       Past Medical History:  Past Medical History:   Diagnosis Date    Anemia     Anxiety     Asthma 10/11/2016    Bipolar 1 disorder     Diabetes mellitus, type 2     Diabetic neuropathy     GERD (gastroesophageal reflux disease)     Hyperlipidemia     Hypertension     Migraine     Obesity 2016       Past Surgical History:  Past Surgical History:   Procedure Laterality Date    BELT ABDOMINOPLASTY      BREAST SURGERY Bilateral     Reduction     SECTION      X 2    COLONOSCOPY      HYSTERECTOMY      MOUTH SURGERY       TUBAL LIGATION           Family History:  Family History   Problem Relation Age of Onset    Diabetes Mother     Hyperlipidemia Mother     Hypertension Mother     Asthma Mother     COPD Mother     Glaucoma Mother     Thyroid disease Mother     Hypertension Father     Hyperlipidemia Father     Cancer Father      Brain, lung, liver, kidney    Heart disease Maternal Grandmother     Hyperlipidemia Maternal Grandmother     Hypertension Maternal Grandmother     Cataracts Maternal Grandmother     Diabetes Maternal Grandmother     Heart disease Maternal Grandfather     Hyperlipidemia Maternal Grandfather     Hypertension Maternal Grandfather     Glaucoma Maternal Grandfather     Cancer Maternal Grandfather     Cataracts Maternal Grandfather     Macular degeneration Maternal Grandfather     Diabetes Maternal Grandfather     Heart disease Paternal Grandmother     Hyperlipidemia Paternal Grandmother     Hypertension Paternal Grandmother     Cataracts Paternal Grandmother     Heart disease Paternal Grandfather     Hyperlipidemia Paternal Grandfather     Hypertension Paternal Grandfather     Cataracts Paternal Grandfather        Social History:  Social History     Social History Main Topics    Smoking status: Never Smoker    Smokeless tobacco: Never Used    Alcohol use 0.0 oz/week     0 Standard drinks or equivalent per week      Comment: socially    Drug use: No    Sexual activity: Not given       ROS   Review of Systems   Constitutional: Negative for fever.   HENT: Negative for sore throat.    Respiratory: Negative for shortness of breath.    Cardiovascular: Negative for chest pain.   Gastrointestinal: Negative for nausea.   Genitourinary: Negative for dysuria.   Musculoskeletal: Positive for arthralgias (R hip) and myalgias (R leg). Negative for back pain.   Skin: Negative for rash.   Neurological: Negative for weakness.   Hematological: Does not bruise/bleed easily.   All other systems  "reviewed and are negative.    Physical Exam    Initial Vitals   BP Pulse Resp Temp SpO2   03/16/17 2204 03/16/17 2204 03/16/17 2204 03/16/17 2204 03/16/17 2204   173/89 92 18 98.1 °F (36.7 °C) 96 %      Physical Exam  Nursing Notes and Vital Signs Reviewed.  Constitutional: Patient is in no acute distress. Awake and alert. Well-developed and well-nourished. Obese.   Head: Atraumatic. Normocephalic.  Eyes: PERRL. EOM intact. Conjunctivae are not pale. No scleral icterus.  ENT: Mucous membranes are moist. Oropharynx is clear and symmetric.    Neck: Supple. Full ROM. No lymphadenopathy.  Cardiovascular: Regular rate. Regular rhythm. No murmurs, rubs, or gallops. Distal pulses are 2+ and symmetric.  Pulmonary/Chest: No respiratory distress. Clear to auscultation bilaterally. No wheezing, rales, or rhonchi.  Abdominal: Soft and non-distended.  There is no tenderness.  No rebound, guarding, or rigidity. Good bowel sounds.  Musculoskeletal: Moves all extremities. No obvious deformities. 1+ bilateral lower extremity edema. No calf tenderness.  Right hip with no tenderness, pain with ROM.  Skin: Warm and dry.  Neurological:  Alert, awake, and appropriate.  Normal speech.  No acute focal neurological deficits are appreciated.  Psychiatric: Normal affect. Good eye contact. Appropriate in content.    ED Course    Procedures  ED Vital Signs:  Vitals:    03/16/17 2204 03/17/17 0023   BP: (!) 173/89 (!) 142/81   Pulse: 92 89   Resp: 18 18   Temp: 98.1 °F (36.7 °C) 98 °F (36.7 °C)   TempSrc: Oral Oral   SpO2: 96% 99%   Weight: 105.7 kg (233 lb)    Height: 4' 8" (1.422 m)          Imaging Results:  Imaging Results         X-Ray Hip 2 View Right (Final result) Result time:  03/16/17 23:54:32    Final result by Angela Mejia MD (03/16/17 23:54:32)    Impression:      Negative for fracture.      Electronically signed by: ANGELA MEJIA MD  Date:     03/16/17  Time:    23:54     Narrative:    EXAM: XR HIP 2 VIEW RIGHT    CLINICAL " HISTORY:  Right hip pain    FINDINGS:  Negative for fracture or dislocation.  No significant arthritic changes.  Probable linear area of ligamentous ossification just medial to the right acetabulum.                      The Emergency Provider reviewed the vital signs and test results, which are outlined above.    ED Discussion     12:18 AM: Discussed with pt all pertinent ED information and results. Discussed pt dx and plan of tx. Gave pt all f/u and return to the ED instructions. All questions and concerns were addressed at this time. Pt expresses understanding of information and instructions, and is comfortable with plan to discharge. Pt is stable for discharge.      ED Medication(s):  Medications - No data to display    Discharge Medication List as of 3/17/2017 12:16 AM      START taking these medications    Details   tramadol (ULTRAM) 50 mg tablet Take 1 tablet (50 mg total) by mouth every 6 (six) hours as needed for Pain., Starting 3/17/2017, Until Mon 3/27/17, Print             Follow-up Information     Follow up with Vish Stafford MD In 2 days.    Specialty:  Internal Medicine    Contact information:    [address] [facility] [address] [phone]          Follow up with [facility] - .    Specialty:  Emergency Medicine    Why:  If symptoms worsen    Contact information:    [address] [facility] [address] [address] [phone]            Medical Decision Making    Medical Decision Making:   Clinical Tests:   Radiological Study: Ordered and Reviewed           Scribe Attestation:   Scribe #1: I performed the above scribed service and the documentation accurately describes the services I performed. I attest to the accuracy of the note.    Attending:   Physician Attestation Statement for Scribe #1: I, Robert Segundo MD, personally performed the services described in this documentation, as scribed by José Miguel Mckeon, in my presence, and it is both accurate and complete.           Clinical Impression       ICD-10-CM ICD-9-CM   1. Right hip pain M25.551 719.45   2. Dependent edema R60.9 782.3       Disposition:   Disposition: Discharged  Condition: Stable         Robert Segundo MD  03/17/17 0034

## 2017-03-17 NOTE — TELEPHONE ENCOUNTER
Patient advised per OOC protocol verbalized understanding, agreed with recommendations to seek medical care at the nearest/local medical facility of choice for medical evaluation/treatment/intervention of current symptoms; patient had no further questions at end of call.

## 2017-03-17 NOTE — TELEPHONE ENCOUNTER
"  Reason for Disposition   [1] SEVERE pain (e.g., excruciating, unable to do any normal activities) AND [2] not improved after 2 hours of pain medicine     Patient describes pain as "severe sharp pain in leg, patient states she has +1-+2 pitting edema in affected limb.    Answer Assessment - Initial Assessment Questions  1. ONSET: "When did the pain start?"       Today   2. LOCATION: "Where is the pain located?"       Right leg   3. PAIN: "How bad is the pain?"    (Scale 1-10; or mild, moderate, severe)    -  MILD (1-3): doesn't interfere with normal activities     -  MODERATE (4-7): interferes with normal activities (e.g., work or school) or awakens from sleep, limping     -  SEVERE (8-10): excruciating pain, unable to do any normal activities, unable to walk      Severe   4. WORK OR EXERCISE: "Has there been any recent work or exercise that involved this part of the body?"       No   5. CAUSE: "What do you think is causing the leg pain?"      Unsure   6. OTHER SYMPTOMS: "Do you have any other symptoms?" (e.g., chest pain, back pain, breathing difficulty, swelling, rash, fever, numbness, weakness)      Heartburn  7. PREGNANCY: "Is there any chance you are pregnant?" "When was your last menstrual period?"      No    Protocols used: ST LEG PAIN-A-AH    "

## 2017-03-17 NOTE — MR AVS SNAPSHOT
[facility]  [address]  [place] [address]  Phone: [phone]  Fax: [phone]                  Yolanda Betts   3/17/2017 11:40 AM   Office Visit    Description:  Female : 1970   Provider:  LINO Blanco   Department:  [facility] - Internal Medicine           Reason for Visit     Follow-up           Diagnoses this Visit        Comments    Edema, unspecified type    -  Primary     SOB (shortness of breath)         Abdominal distension         Weight gain         Diabetic polyneuropathy associated with type 2 diabetes mellitus         Bipolar 1 disorder         Mild intermittent asthma without complication         Essential hypertension                To Do List           Future Appointments        Provider Department Dept Phone    3/17/2017 12:35 PM [facility], [facility] [phone]    3/17/2017 1:00 PM [facility] XR2 [facility] [phone]    3/17/2017 2:30 PM LINO Garcia-JHON [facility] [phone]    3/17/2017 3:30 PM [facility] US3 [facility] [phone]    3/20/2017 9:00 AM [facility] US1 [facility]  [phone]      Goals (5 Years of Data)     None       These Medications        Disp Refills Start End    gabapentin (NEURONTIN) 300 MG capsule 90 capsule 0 3/17/2017 2017    Take 1 capsule (300 mg total) by mouth 3 (three) times daily. - Oral    Pharmacy: [id number] - [place] - [address] AT [facility] Ph #: [phone]         [facility]     [facility] On Call Nurse Care Line -  Assistance  Registered nurses in the [facility] Center provide clinical advisement, health education, appointment booking, and other advisory services.  Call for this free service at [phone].             Medications           Message regarding Medications     Verify the changes and/or additions to your medication regime listed below are  "the same as discussed with your clinician today.  If any of these changes or additions are incorrect, please notify your healthcare provider.        CHANGE how you are taking these medications     Start Taking Instead of    gabapentin (NEURONTIN) 300 MG capsule gabapentin (NEURONTIN) 300 MG capsule    Dosage:  Take 1 capsule (300 mg total) by mouth 3 (three) times daily. Dosage:  Take 1 capsule (300 mg total) by mouth 3 (three) times daily. Take 1 cap qhs for 1 wk, then 2 caps qhs x 1 wk, then 3 caps qhs thereafter    Reason for Change:  Reorder            Verify that the below list of medications is an accurate representation of the medications you are currently taking.  If none reported, the list may be blank. If incorrect, please contact your healthcare provider. Carry this list with you in case of emergency.           Current Medications     albuterol (PROAIR HFA) 90 mcg/actuation inhaler Inhale 2 puffs into the lungs every 4 (four) hours as needed for Wheezing or Shortness of Breath.    albuterol (PROVENTIL) 2.5 mg /3 mL (0.083 %) nebulizer solution Take 3 mLs (2.5 mg total) by nebulization every 4 (four) hours.    alprazolam (XANAX) 1 MG tablet Take 1 mg by mouth 3 (three) times daily as needed.    aripiprazole (ABILIFY) 20 MG Tab Take 20 mg by mouth once daily.    aspirin (ECOTRIN) 81 MG EC tablet Take 81 mg by mouth once daily.    BD INSULIN SYRINGE ULTRA-FINE 1/2 mL 30 gauge x 1/2" Syrg     blood-glucose meter (FREESTYLE SYSTEM KIT) kit Dispense glucometer approved by patients insurance    clotrimazole (LOTRIMIN) 1 % cream Apply topically 2 (two) times daily. Between toes X 2 weeks    duloxetine (CYMBALTA) 30 MG capsule Take 2 capsules (60 mg total) by mouth 2 (two) times daily.    ergocalciferol (ERGOCALCIFEROL) 50,000 unit Cap Take 1 capsule (50,000 Units total) by mouth every 7 days.    fenofibrate (TRICOR) 48 MG tablet Take 1 tablet (48 mg total) by mouth once daily.    fluoxetine (PROZAC) 20 MG capsule " Take 20 mg by mouth once daily.    fluticasone (FLONASE) 50 mcg/actuation nasal spray 1 spray by Each Nare route once daily.    fluticasone-salmeterol 250-50 mcg/dose (ADVAIR) 250-50 mcg/dose diskus inhaler Inhale 1 puff into the lungs 2 (two) times daily.    gabapentin (NEURONTIN) 300 MG capsule Take 1 capsule (300 mg total) by mouth 3 (three) times daily.    hydrALAZINE (APRESOLINE) 10 MG tablet Take 1 tablet (10 mg total) by mouth 3 (three) times daily.    insulin aspart (NOVOLOG FLEXPEN) 100 unit/mL InPn pen Inject 16 Units into the skin 3 (three) times daily with meals.    insulin detemir (LEVEMIR FLEXPEN) 100 unit/mL (3 mL) SubQ InPn pen Inject 26 Units into the skin 2 (two) times daily.    LINZESS 145 mcg Cap capsule Take 145 mcg by mouth once daily.    metformin (GLUCOPHAGE) 1000 MG tablet Take 1 tablet (1,000 mg total) by mouth 2 (two) times daily.    naproxen sodium (ANAPROX) 550 MG tablet Take 1 tablet (550 mg total) by mouth 2 (two) times daily with meals.    nebulizer and compressor Mariela 1 Device by Misc.(Non-Drug; Combo Route) route every 4 (four) hours as needed.    pravastatin (PRAVACHOL) 80 MG tablet TAKE 1 TABLET BY MOUTH ONCE DAILY    promethazine (PHENERGAN) 25 MG tablet Take 1 tablet (25 mg total) by mouth every 4 to 6 hours as needed for Nausea. sedating    RESTASIS 0.05 % ophthalmic emulsion Place 1 drop into both eyes 2 (two) times daily.    rizatriptan (MAXALT) 10 MG tablet Take 1 tablet (10 mg total) by mouth once as needed for Migraine.    topiramate (TOPAMAX) 25 MG tablet Take 1 tablet (25 mg total) by mouth 2 (two) times daily.    topiramate (TOPAMAX) 50 MG tablet Take 1 tablet (50 mg total) by mouth 2 (two) times daily.    tramadol (ULTRAM) 50 mg tablet Take 1 tablet (50 mg total) by mouth every 6 (six) hours as needed for Pain.    valsartan-hydrochlorothiazide (DIOVAN-HCT) 320-25 mg per tablet Take 1 tablet by mouth once daily.    verapamil (VERELAN) 180 MG C24P Take 1 capsule (180 mg  "total) by mouth once daily.    zolpidem (AMBIEN) 10 mg Tab Take 10 mg by mouth every evening.           Clinical Reference Information           Your Vitals Were     BP Pulse Temp Height    130/78 (BP Location: Right arm, Patient Position: Sitting, BP Method: Manual) 102 97.3 °F (36.3 °C) (Tympanic) 4' 8" (1.422 m)    Weight SpO2 BMI    105.9 kg (233 lb 7.5 oz) 98% 52.34 kg/m2      Blood Pressure          Most Recent Value    BP  130/78      Allergies as of 3/17/2017     Lortab [Hydrocodone-acetaminophen]    Neuromuscular Blockers, Steroidal    Morphine    Seconal [Secobarbital Sodium]    Tylox [Oxycodone-acetaminophen]      Immunizations Administered on Date of Encounter - 3/17/2017     None      Orders Placed During Today's Visit     Future Labs/Procedures Expected by Expires    Brain natriuretic peptide  3/17/2017 5/16/2018    CBC auto differential  3/17/2017 5/16/2018    Comprehensive metabolic panel  3/17/2017 5/16/2018    US Abdomen Complete  3/17/2017 3/17/2018    US Lower Extremity Veins Bilateral  3/17/2017 3/17/2018    X-Ray Chest PA And Lateral  3/17/2017 3/17/2018      Language Assistance Services     ATTENTION: Language assistance services are available, free of charge. Please call [phone].      ATENCIÓN: Si habla abañol, tiene a presley disposición servicios gratuitos de asistencia lingüística. Llame al [phone].     CHÚ Ý: N?u b?n nói Ti?ng Vi?t, có các d?ch v? h? tr? ngôn ng? mi?n phí dành cho b?n. G?i s? [phone].         [facility] complies with applicable Federal civil rights laws and does not discriminate on the basis of race, color, national origin, age, disability, or sex.        "

## 2017-03-17 NOTE — PROGRESS NOTES
"Subjective:       Patient ID: Yolanda Betts is a 46 y.o. female.    Chief Complaint: Follow-up    HPI Comments: 46 year old female presents to clinic for ER f/u. PCP is Dr. Stafford. She reports unintentionally gaining about 20 pounds over the last 3-4 months. EMR shows pt's weight was 214 pounds on 11/1/17 and current weight is 233 pounds. She reports BLE edema. She reports elevated BP up to 170s/90s in the evenings, but she says she does not check her BP everyday. Current BP is 130/78. She reports some SOB X 3-4 weeks, worsened with exertion. She reports using her Advair daily and has used albuterol inhaler without relief of SOB. She reports onset of B hip pain, worse on R, 1-2 months ago. She reports pain worsens with ambulation and use of hips. She reports going to ER yesterday due to R hip pain and had R hip xrays that showed "probable linear area of ligamentous ossification just medial to the right acetabulum" without significant arthritis findings. She also saw ortho 2 months ago for pains. She is scheduled to see rheum in one week. She requests refill of Neurontin. She saw Dr. Heaton 2 days ago and was given hydralazine, which pt says she is not taking. She was also referred to DM clinic. A1C 1/31/17 was 8.5. TSH and FT4 earlier this week were WNL. She reports no CP, rash, N/V, syncope, numbness/tingling, muscular weakness, abd pain, dizziness, urinary sxs, or other medical complaints.     Past Medical History:  No date: Anemia  No date: Anxiety  10/11/2016: Asthma  No date: Bipolar 1 disorder  No date: Diabetes mellitus, type 2  No date: Diabetic neuropathy  No date: GERD (gastroesophageal reflux disease)  No date: Hyperlipidemia  No date: Hypertension  No date: Migraine  11/1/2016: Obesity        Review of Systems   Constitutional: Positive for unexpected weight change. Negative for chills and fever.   Respiratory: Positive for shortness of breath. Negative for cough.    Cardiovascular: Positive for leg " swelling. Negative for chest pain and palpitations.   Gastrointestinal: Positive for abdominal distention. Negative for abdominal pain, nausea and vomiting.   Genitourinary: Negative for difficulty urinating and dysuria.   Musculoskeletal: Positive for arthralgias and back pain. Negative for joint swelling.   Skin: Negative for rash.   Neurological: Negative for dizziness, syncope, weakness, numbness and headaches.   Psychiatric/Behavioral: Negative for confusion.       Objective:      Physical Exam   Constitutional: She is oriented to person, place, and time. She appears well-developed and well-nourished. No distress.   HENT:   Head: Normocephalic and atraumatic.   Eyes: EOM are normal. No scleral icterus.   Neck: Neck supple.   Cardiovascular: Normal rate and regular rhythm.    Pulmonary/Chest: Effort normal and breath sounds normal. No respiratory distress. She has no decreased breath sounds. She has no wheezes. She has no rhonchi. She has no rales.   Abdominal: Soft. Bowel sounds are normal. She exhibits distension. There is generalized tenderness (mild). There is no rigidity, no rebound, no guarding and no CVA tenderness.   Musculoskeletal: Normal range of motion.   No midline spine TTP or step-offs; mild TTP R lumbar back and lateral R hip region; R hip pain with rotations of R hip; BLE pretib edema; pos Alistair's sign B; no erythema, bruising, or open wounds; 5+ strength   Neurological: She is alert and oriented to person, place, and time. No cranial nerve deficit.   Skin: Skin is warm and dry. No rash noted.   Psychiatric: She has a normal mood and affect. Her speech is normal and behavior is normal. Thought content normal.       Assessment:       1. Edema, unspecified type    2. SOB (shortness of breath)    3. Abdominal distension    4. Weight gain    5. Diabetic polyneuropathy associated with type 2 diabetes mellitus    6. Bipolar 1 disorder    7. Mild intermittent asthma without complication    8. Essential  hypertension        Plan:         1. CBC, CMP, BNP, CXR, abd U/S, and BLE venous U/S today with review following.   2. Gabapentin refilled for pt per request - sedation warning discussed with pt.   3. Healthy diet. Monitor BP and glucose. F/u with rheum and DM clinic as recommended.  4. Refer to ortho for eval of R hip pain due to severe pain at this time.  5. F/u with PCP in one week for further management. ER if sxs worsen.

## 2017-03-20 ENCOUNTER — TELEPHONE (OUTPATIENT)
Dept: INTERNAL MEDICINE | Facility: CLINIC | Age: 47
End: 2017-03-20

## 2017-03-20 NOTE — TELEPHONE ENCOUNTER
----- Message from LINO Blanco sent at 3/20/2017  8:07 AM CDT -----  Heart marker normal and leg U/S does not show any blood clots.

## 2017-03-22 ENCOUNTER — TELEPHONE (OUTPATIENT)
Dept: HEMATOLOGY/ONCOLOGY | Facility: CLINIC | Age: 47
End: 2017-03-22

## 2017-03-22 NOTE — TELEPHONE ENCOUNTER
Missed appt/dismissed for bad credit alert when attempted to mercedes. Alerted pt to call financial service to arrange payment in order to reschedule. Voiced understanding

## 2017-03-24 ENCOUNTER — INITIAL CONSULT (OUTPATIENT)
Dept: RHEUMATOLOGY | Facility: CLINIC | Age: 47
End: 2017-03-24
Payer: COMMERCIAL

## 2017-03-24 ENCOUNTER — CLINICAL SUPPORT (OUTPATIENT)
Dept: DIABETES | Facility: CLINIC | Age: 47
End: 2017-03-24
Payer: COMMERCIAL

## 2017-03-24 VITALS — BODY MASS INDEX: 52.47 KG/M2 | WEIGHT: 233.25 LBS | HEIGHT: 56 IN

## 2017-03-24 VITALS
HEART RATE: 94 BPM | DIASTOLIC BLOOD PRESSURE: 91 MMHG | WEIGHT: 234.38 LBS | BODY MASS INDEX: 52.54 KG/M2 | SYSTOLIC BLOOD PRESSURE: 160 MMHG

## 2017-03-24 DIAGNOSIS — E66.01 OBESITY, MORBID, BMI 40.0-49.9: ICD-10-CM

## 2017-03-24 DIAGNOSIS — E11.42 DIABETIC POLYNEUROPATHY ASSOCIATED WITH TYPE 2 DIABETES MELLITUS: Primary | ICD-10-CM

## 2017-03-24 DIAGNOSIS — M21.40 PES PLANUS, UNSPECIFIED LATERALITY: ICD-10-CM

## 2017-03-24 DIAGNOSIS — M79.7 FIBROMYALGIA: ICD-10-CM

## 2017-03-24 DIAGNOSIS — E11.9 TYPE 2 DIABETES MELLITUS WITHOUT COMPLICATION: ICD-10-CM

## 2017-03-24 DIAGNOSIS — D64.9 ANEMIA, UNSPECIFIED TYPE: ICD-10-CM

## 2017-03-24 DIAGNOSIS — E11.9 TYPE II OR UNSPECIFIED TYPE DIABETES MELLITUS WITHOUT MENTION OF COMPLICATION, NOT STATED AS UNCONTROLLED: Primary | ICD-10-CM

## 2017-03-24 DIAGNOSIS — R70.0 ELEVATED SED RATE (ELEV SR): Primary | ICD-10-CM

## 2017-03-24 PROCEDURE — 99999 PR PBB SHADOW E&M-EST. PATIENT-LVL III: CPT | Mod: PBBFAC,,, | Performed by: INTERNAL MEDICINE

## 2017-03-24 PROCEDURE — 3077F SYST BP >= 140 MM HG: CPT | Mod: S$GLB,,, | Performed by: INTERNAL MEDICINE

## 2017-03-24 PROCEDURE — 3080F DIAST BP >= 90 MM HG: CPT | Mod: S$GLB,,, | Performed by: INTERNAL MEDICINE

## 2017-03-24 PROCEDURE — 99204 OFFICE O/P NEW MOD 45 MIN: CPT | Mod: S$GLB,,, | Performed by: INTERNAL MEDICINE

## 2017-03-24 PROCEDURE — 1160F RVW MEDS BY RX/DR IN RCRD: CPT | Mod: S$GLB,,, | Performed by: INTERNAL MEDICINE

## 2017-03-24 PROCEDURE — 99999 PR PBB SHADOW E&M-EST. PATIENT-LVL IV: CPT | Mod: PBBFAC,,, | Performed by: DIETITIAN, REGISTERED

## 2017-03-24 PROCEDURE — G0108 DIAB MANAGE TRN  PER INDIV: HCPCS | Mod: S$GLB,,, | Performed by: DIETITIAN, REGISTERED

## 2017-03-24 RX ORDER — LANCETS 33 GAUGE
1 EACH MISCELLANEOUS 3 TIMES DAILY
Qty: 100 EACH | Refills: 11 | Status: SHIPPED | OUTPATIENT
Start: 2017-03-24 | End: 2017-04-17 | Stop reason: CLARIF

## 2017-03-24 NOTE — LETTER
March 24, 2017      Jacky Bowen PA-C  [address] [place] [address]           [facility] - [facility]  [address] [facility] [address] [place] [address]  Phone: [phone]  Fax: [phone]          Patient: Yolanda Betts   MR Number: [id number]   YOB: 1970   Date of Visit: 3/24/2017       Dear Jacky Bowen:    Thank you for referring Yolanda Betts to me for evaluation. Attached you will find relevant portions of my assessment and plan of care.    If you have questions, please do not hesitate to call me. I look forward to following Yolanda Betts along with you.    Sincerely,    Tona Kessler MD    Enclosure  CC:  No Recipients    If you would like to receive this communication electronically, please contact [email or web address] or [phone] to request more information on [employer] access.    For providers and/or their staff who would like to refer a patient to [facility], please contact us through our one-stop-shop provider referral line, [facility], at [phone].    If you feel you have received this communication in error or would no longer like to receive these types of communications, please e-mail [email or web address]

## 2017-03-24 NOTE — PROGRESS NOTES
Diabetes Education  Author: Hilary Barney RD, CDE  Date: 3/24/2017    Diabetes Education Visit  Diabetes Education Record Assessment/Progress: Initial    Diabetes Type  Diabetes Type : Type II    Diabetes History  Diabetes Diagnosis: >10 years    Nutrition  Meal Planning: water, eats out often, snacks between meal, skipping meals  Meal Plan 24 Hour Recall - Breakfast: yogurt cup OR Sausage biscuit, hash brown (sometimes eats) - water  Meal Plan 24 Hour Recall - Lunch: hamburger, onion rings - water  Meal Plan 24 Hour Recall - Dinner: none (typical)  Meal Plan 24 Hour Recall - Snack: fruit    Monitoring   Monitoring: Freestyle Freedom Lite  Self Monitoring : few times per week  Blood Glucose Logs: No  Needs new meter today.    Exercise   Frequency: Never    Current Diabetes Treatment   Current Treatment: Insulin, Diet, Oral Medication    Social History  Preferred Learning Method: Face to Face  Primary Support: Self  Occupation: Nurse in long-term care setting  Smoking Status: Never a Smoker  Alcohol Use: Monthly    Barriers to Change  Barriers to Change: None  Learning Challenges : None    Readiness to Learn   Readiness to Learn : Acceptance    Cultural Influences  Cultural Influences: No    Diabetes Education Assessment/Progress  Acute Complications (preventing, detecting, and treating acute complications): Discussion, Competent (verbalizes/demonstrates)  Chronic Complications (preventing, detecting, and treating chronic complications): Discussion, Competent (verbalizes/demonstrates)  Diabetes Disease Process (diabetes disease process and treatment options): Discussion, Competent (verbalizes/demonstrates)  Nutrition (Incorporating nutritional management into one's lifestyle): Discussion, Competent (verbalizes/demonstrates)  Physical Activity (incorporating physical activity into one's lifestyle): Discussion, Competent (verbalizes/demonstrates)  Medications (states correct name, dose, onset, peak, duration, side  effects & timing of meds): Discussion, Competent (verbalizes/demonstrates)  Monitoring (monitoring blood glucose/other parameters & using results): Discussion, Competent (verbalizes/demonstrates)  Goal Setting and Problem Solving (verbalizes behavior change strategies & sets realistic goals): Discussion, Competent (verbalizes/demonstrates)  Behavior Change (developing personal strategies to health & behavior change): Discussion, Comnpetent (verbalizes/demonstrates)  Psychosocial Issues (developing personal srategies to address psychosocial concerns): Discussion, Competent (verbalizes/demonstrates)    Goals  Monitoring: Set (BG monitoring at least BID)  Start Date: 03/24/17  Target Date: 06/24/17    Diabetes Care Plan/Intervention  Education Plan/Intervention: Basic Group Class  Lipids and Micral today   Meter provided with instruction and Rx for supplies.    Diabetes Meal Plan  Restrictions: Low Fat, Low Sodium  Calories: 1400  Carbohydrate Per Meal: 30-45g  Carbohydrate Per Snack : 15-30g    Education Units of Time   Time Spent: 60 min    Health Maintenance Topics with due status: Not Due       Topic Last Completion Date    Foot Exam 12/01/2016    Pneumococcal PPSV23 (Medium Risk) 01/31/2017    Hemoglobin A1c 01/31/2017    Eye Exam 03/15/2017     Health Maintenance Due   Topic Date Due    TETANUS VACCINE  05/17/1990    Pap Smear  05/17/1993    Lipid Panel  03/22/2017    Mammogram  03/29/2017

## 2017-03-24 NOTE — PROGRESS NOTES
Subjective:       Patient ID: Yolanda Betts is a 46 y.o. female.    Chief Complaint: Joint Pain    HPI   Patient is a vasculopath, diabetic, morbidly obese female being referred here for polyarthralgias.   Her work up reveals neg ccp, rf and donald.  xrays show DJD, DDD lumbar spine. She had trochanteric bursitis at her last appointment and was sent to PT and started Mobic.     She hasn't tried any medicine until recently she was started on gabapentin. She takes 1 in the AM and 2 in the evening. She has plans to start therapy.       Review of Systems    +dry eyes  +dry mouth  No hx of uveitis, scleritis  Weight going up  +nightsweats since hysterectomy in 2006  Fever: no  Mouth ulcers: will get ulcers on her tongue sometimes   Swollen glands:no  Headache: yes  Abnormal hair loss:no  Chest pain: none currently   SOB: yes, asthma  Difficulty swallowing:no  Cough: no  Heartburn:yes  Constipation:yes  Diarrhea:yes  Weight gain or loss: weight gain  Genital ulcers:no  Burning on urination: no  Skin rash: no  Color changes fingers:no  Burning or tingling: neuropathy   Bruising:no                                   Widespread pain index: 15 Note the areas which the patient has had pain over the last week:                   Shoulder-girdle, left               Shoulder-girdle, right                         Upper arm left                       Upper arm right                         Lower arm left                       Lower arm right    Hip (buttock, trochanter) left  Hip (buttock, trochanter) right                           Upper leg, left                         Upper leg, right                           Lower leg, left                         Lower leg, right                                     Jaw, left                                   Jaw, right                                        Chest - occasional, negative                                   Abdomen                               Upper back                               Lower back                                        Neck  Score will be from 0-19:               PMHX:  Diabetes  Asthma  Depression  Anxiety  Fibromyalgia  Migraines  Insomnia  HTN  HL      Mom with fibromyalgia and RA  No hx of SLE    Objective:   BP (!) 160/91  Pulse 94  Wt 106.3 kg (234 lb 5.6 oz)  BMI 52.54 kg/m2     Physical Exam   Vitals reviewed.  Constitutional: She is oriented to person, place, and time and well-developed, well-nourished, and in no distress. No distress.   HENT:   Head: Normocephalic and atraumatic.   Right Ear: External ear normal.   Left Ear: External ear normal.   Dry mouth     Eyes: Conjunctivae are normal. Pupils are equal, round, and reactive to light. Right eye exhibits no discharge. Left eye exhibits no discharge. No scleral icterus.   Neck: Normal range of motion. Neck supple.   Cardiovascular: Regular rhythm and normal heart sounds.    Tachy     Pulmonary/Chest: Effort normal. No respiratory distress.   Neurological: She is alert and oriented to person, place, and time. No cranial nerve deficit. She exhibits normal muscle tone.   Skin: Skin is warm and dry. No rash noted. She is not diaphoretic. No erythema.     Psychiatric: Mood, memory, affect and judgment normal.   Musculoskeletal: Normal range of motion. She exhibits tenderness (fibro tender points, subacromial bursae, tender anterior tibial tendon and posterior tibial tendons) and deformity (pes planus). She exhibits no edema.   No synovitis             LABS REVIEWED  Anemia  Results for PILO YANCEY (MRN [id number]) as of 3/24/2017 14:20   Ref. Range 3/17/2017 12:56   WBC Latest Ref Range: 3.90 - 12.70 K/uL 6.89   RBC Latest Ref Range: 4.00 - 5.40 M/uL 3.91 (L)   Hemoglobin Latest Ref Range: 12.0 - 16.0 g/dL 9.8 (L)   Hematocrit Latest Ref Range: 37.0 - 48.5 % 32.4 (L)   MCV Latest Ref Range: 82 - 98 fL 83   MCH Latest Ref Range: 27.0 - 31.0 pg 25.1 (L)   MCHC Latest Ref Range: 32.0 - 36.0 % 30.2 (L)   RDW Latest Ref Range:  11.5 - 14.5 % 15.9 (H)   Platelets Latest Ref Range: 150 - 350 K/uL 277   MPV Latest Ref Range: 9.2 - 12.9 fL 8.8 (L)   Gran% Latest Ref Range: 38.0 - 73.0 % 56.2   Gran # Latest Ref Range: 1.8 - 7.7 K/uL 3.9   Lymph% Latest Ref Range: 18.0 - 48.0 % 36.6   Lymph # Latest Ref Range: 1.0 - 4.8 K/uL 2.5   Mono% Latest Ref Range: 4.0 - 15.0 % 4.1   Mono # Latest Ref Range: 0.3 - 1.0 K/uL 0.3   Eosinophil% Latest Ref Range: 0.0 - 8.0 % 2.8   Eos # Latest Ref Range: 0.0 - 0.5 K/uL 0.2   Basophil% Latest Ref Range: 0.0 - 1.9 % 0.3   Baso # Latest Ref Range: 0.00 - 0.20 K/uL 0.02   Sodium Latest Ref Range: 136 - 145 mmol/L 140   Potassium Latest Ref Range: 3.5 - 5.1 mmol/L 4.3   Chloride Latest Ref Range: 95 - 110 mmol/L 105   CO2 Latest Ref Range: 23 - 29 mmol/L 27   Anion Gap Latest Ref Range: 8 - 16 mmol/L 8   BUN, Bld Latest Ref Range: 6 - 20 mg/dL 10   Creatinine Latest Ref Range: 0.5 - 1.4 mg/dL 0.7   eGFR if non African American Latest Ref Range: >60 mL/min/1.73 m^2 >60   eGFR if  Latest Ref Range: >60 mL/min/1.73 m^2 >60   Glucose Latest Ref Range: 70 - 110 mg/dL 150 (H)   Calcium Latest Ref Range: 8.7 - 10.5 mg/dL 9.4   Alkaline Phosphatase Latest Ref Range: 55 - 135 U/L 82   Total Protein Latest Ref Range: 6.0 - 8.4 g/dL 7.4   Albumin Latest Ref Range: 3.5 - 5.2 g/dL 3.3 (L)   Total Bilirubin Latest Ref Range: 0.1 - 1.0 mg/dL 0.2   AST Latest Ref Range: 10 - 40 U/L 19   ALT Latest Ref Range: 10 - 44 U/L 23   BNP Latest Ref Range: 0 - 99 pg/mL 11       IMAGING    cxr normal     Cervical xray normal     Right hip  images and report were reviewed today. I agree with the radiologist's interpretation. Negative for fracture or dislocation. No significant arthritic changes.  Probable linear area of ligamentous ossification just medial to the right acetabulum.     Lumbar spine from 1/20/17 images and report were reviewed today. I agree with the radiologist's interpretation. The vertebral bodies demonstrate  normal height.  The alignment is within normal limits. The disk space heights are maintained. Prominent bilateral facet arthropathy noted at the L5-S1 level with more mild facet arthropathy noted at L4-L5 level bilaterally. No pars defects.     MRI:   Lumbar spine from 1/26/17 images and report were reviewed today. I agree with the radiologist's interpretation. Degenerative changes L5-S1. Otherwise unremarkable exam.        Assessment:       1. Elevated sed rate (elev SR)    2. Pes planus, unspecified laterality    3. Obesity, morbid, BMI 40.0-49.9    4. Fibromyalgia    5. Anemia, unspecified type        Elevated sed rate in the setting of multiple arthralgias with OA of the knees, lumbar spine and also mechanical problems like pes planus, obesity, poor posture. All of these will worsen her underlying known fibromyalgia.   She has some dry eyes on restasis and previous rheumatologic work-up is negative. I don't suspect RA at this point in time but she does have dry eyes and dry mouth which could mean Sjogren's but she has negative ISAAC.     Aneamia- getting worked up by hematology     Plan:   Cbc  cmp  Sed rate   crp  SSA  SSB    spep and immunofixation  Follow-up with hematology for anemia work-up  Bilateral hand, feet, knee xrays    Posture exercises    Continue therapy    Send to podiatry to evaluate for shoe inserts    Aerobic exercise  Anti inflammatory diet  Weight loss    No follow-up unless she has rheumatologic disease  She  Can continue to follow-up with pain management, PCP, therapy  Patient has lower income so less unnecessary doctor's visits may be most appropriate for her so she may use her copays for therapy which she definitely needs    MB

## 2017-03-24 NOTE — MR AVS SNAPSHOT
[facility]  [address] [facility] [place] [address]  Phone: [phone]  Fax: [phone]                  Yolanda Betts   3/24/2017 1:00 PM   Clinical Support    Description:  Female : 1970   Provider:  Hilary Barney RD, CDE   Department:  [facility]           Reason for Visit     Diabetes           Diagnoses this Visit        Comments    Diabetic polyneuropathy associated with type 2 diabetes mellitus    -  Primary            To Do List           Future Appointments        Provider Department Dept Phone    3/24/2017 2:00 PM Tona Kessler MD [facility] - Rheumatology [phone]    2017 3:40 PM Vish Stafford MD [facility] - Internal Medicine [phone]    2017 8:40 AM PULMONARY LAB, [facility]- Pulmonary Function [facility] [phone]    2017 9:20 AM Sylvain Rainey MD [facility] - Pulmonary Services [phone]      Goals (5 Years of Data)     None      Follow-Up and Disposition     Return E11.42 [place]; ONL classes; lipid/micro today.      [facility] On Call     [facility] On Call Nurse Care Line -  Assistance  Registered nurses in the [facility] On Call Center provide clinical advisement, health education, appointment booking, and other advisory services.  Call for this free service at [phone].             Medications           Message regarding Medications     Verify the changes and/or additions to your medication regime listed below are the same as discussed with your clinician today.  If any of these changes or additions are incorrect, please notify your healthcare provider.             Verify that the below list of medications is an accurate representation of the medications you are currently taking.  If none reported, the list may be blank. If incorrect, please contact your healthcare provider. Carry this list with you in case of emergency.           Current Medications     albuterol (PROAIR HFA) 90 mcg/actuation inhaler Inhale 2 puffs  "into the lungs every 4 (four) hours as needed for Wheezing or Shortness of Breath.    albuterol (PROVENTIL) 2.5 mg /3 mL (0.083 %) nebulizer solution Take 3 mLs (2.5 mg total) by nebulization every 4 (four) hours.    alprazolam (XANAX) 1 MG tablet Take 1 mg by mouth 3 (three) times daily as needed.    aripiprazole (ABILIFY) 20 MG Tab Take 20 mg by mouth once daily.    aspirin (ECOTRIN) 81 MG EC tablet Take 81 mg by mouth once daily.    BD INSULIN SYRINGE ULTRA-FINE 1/2 mL 30 gauge x 1/2" Syrg     blood-glucose meter (FREESTYLE SYSTEM KIT) kit Dispense glucometer approved by patients insurance    clotrimazole (LOTRIMIN) 1 % cream Apply topically 2 (two) times daily. Between toes X 2 weeks    duloxetine (CYMBALTA) 30 MG capsule Take 2 capsules (60 mg total) by mouth 2 (two) times daily.    ergocalciferol (ERGOCALCIFEROL) 50,000 unit Cap Take 1 capsule (50,000 Units total) by mouth every 7 days.    fenofibrate (TRICOR) 48 MG tablet Take 1 tablet (48 mg total) by mouth once daily.    fluoxetine (PROZAC) 20 MG capsule Take 20 mg by mouth once daily.    fluticasone (FLONASE) 50 mcg/actuation nasal spray 1 spray by Each Nare route once daily.    fluticasone-salmeterol 250-50 mcg/dose (ADVAIR) 250-50 mcg/dose diskus inhaler Inhale 1 puff into the lungs 2 (two) times daily.    gabapentin (NEURONTIN) 300 MG capsule Take 1 capsule (300 mg total) by mouth 3 (three) times daily.    hydrALAZINE (APRESOLINE) 10 MG tablet Take 1 tablet (10 mg total) by mouth 3 (three) times daily.    insulin aspart (NOVOLOG FLEXPEN) 100 unit/mL InPn pen Inject 16 Units into the skin 3 (three) times daily with meals.    insulin detemir (LEVEMIR FLEXPEN) 100 unit/mL (3 mL) SubQ InPn pen Inject 26 Units into the skin 2 (two) times daily.    LINZESS 145 mcg Cap capsule Take 145 mcg by mouth once daily.    meloxicam (MOBIC) 15 MG tablet Take 1 tablet (15 mg total) by mouth once daily. Take with food    metformin (GLUCOPHAGE) 1000 MG tablet Take 1 tablet " "(1,000 mg total) by mouth 2 (two) times daily.    nebulizer and compressor Mariela 1 Device by Misc.(Non-Drug; Combo Route) route every 4 (four) hours as needed.    pravastatin (PRAVACHOL) 80 MG tablet TAKE 1 TABLET BY MOUTH ONCE DAILY    promethazine (PHENERGAN) 25 MG tablet Take 1 tablet (25 mg total) by mouth every 4 to 6 hours as needed for Nausea. sedating    RESTASIS 0.05 % ophthalmic emulsion Place 1 drop into both eyes 2 (two) times daily.    rizatriptan (MAXALT) 10 MG tablet Take 1 tablet (10 mg total) by mouth once as needed for Migraine.    topiramate (TOPAMAX) 25 MG tablet Take 1 tablet (25 mg total) by mouth 2 (two) times daily.    topiramate (TOPAMAX) 50 MG tablet Take 1 tablet (50 mg total) by mouth 2 (two) times daily.    tramadol (ULTRAM) 50 mg tablet Take 1 tablet (50 mg total) by mouth every 6 (six) hours as needed for Pain.    valsartan-hydrochlorothiazide (DIOVAN-HCT) 320-25 mg per tablet Take 1 tablet by mouth once daily.    verapamil (VERELAN) 180 MG C24P Take 1 capsule (180 mg total) by mouth once daily.    zolpidem (AMBIEN) 10 mg Tab Take 10 mg by mouth every evening.           Clinical Reference Information           Your Vitals Were     Height Weight BMI          4' 8" (1.422 m) 105.8 kg (233 lb 4 oz) 52.29 kg/m2        Allergies as of 3/24/2017     Lortab [Hydrocodone-acetaminophen]    Neuromuscular Blockers, Steroidal    Morphine    Seconal [Secobarbital Sodium]    Tylox [Oxycodone-acetaminophen]      Immunizations Administered on Date of Encounter - 3/24/2017     None      Language Assistance Services     ATTENTION: Language assistance services are available, free of charge. Please call [phone].      ATENCIÓN: Si franciscola saul, tiene a presley disposición servicios gratuitos de asistencia lingüística. Llame al [phone].     CHÚ Ý: N?u b?n nói Ti?ng Vi?t, có các d?ch v? h? tr? ngôn ng? mi?n phí dành cho b?n. G?i s? [phone].         [facility] complies with " applicable Federal civil rights laws and does not discriminate on the basis of race, color, national origin, age, disability, or sex.

## 2017-03-31 ENCOUNTER — INITIAL CONSULT (OUTPATIENT)
Dept: HEMATOLOGY/ONCOLOGY | Facility: CLINIC | Age: 47
End: 2017-03-31
Payer: COMMERCIAL

## 2017-03-31 ENCOUNTER — LAB VISIT (OUTPATIENT)
Dept: LAB | Facility: HOSPITAL | Age: 47
End: 2017-03-31
Attending: INTERNAL MEDICINE
Payer: COMMERCIAL

## 2017-03-31 VITALS
RESPIRATION RATE: 18 BRPM | BODY MASS INDEX: 51.53 KG/M2 | WEIGHT: 229.06 LBS | HEART RATE: 109 BPM | SYSTOLIC BLOOD PRESSURE: 136 MMHG | TEMPERATURE: 99 F | OXYGEN SATURATION: 95 % | DIASTOLIC BLOOD PRESSURE: 80 MMHG | HEIGHT: 56 IN

## 2017-03-31 DIAGNOSIS — D63.8 ANEMIA OF OTHER CHRONIC DISEASE: Primary | ICD-10-CM

## 2017-03-31 DIAGNOSIS — D63.8 ANEMIA OF OTHER CHRONIC DISEASE: ICD-10-CM

## 2017-03-31 PROCEDURE — 84165 PROTEIN E-PHORESIS SERUM: CPT

## 2017-03-31 PROCEDURE — 82607 VITAMIN B-12: CPT

## 2017-03-31 PROCEDURE — 99999 PR PBB SHADOW E&M-EST. PATIENT-LVL V: CPT | Mod: PBBFAC,,, | Performed by: INTERNAL MEDICINE

## 2017-03-31 PROCEDURE — 99244 OFF/OP CNSLTJ NEW/EST MOD 40: CPT | Mod: S$GLB,,, | Performed by: INTERNAL MEDICINE

## 2017-03-31 PROCEDURE — 82728 ASSAY OF FERRITIN: CPT

## 2017-03-31 PROCEDURE — 82746 ASSAY OF FOLIC ACID SERUM: CPT

## 2017-03-31 PROCEDURE — 36415 COLL VENOUS BLD VENIPUNCTURE: CPT | Mod: PO

## 2017-03-31 PROCEDURE — 83615 LACTATE (LD) (LDH) ENZYME: CPT

## 2017-03-31 PROCEDURE — 83010 ASSAY OF HAPTOGLOBIN QUANT: CPT

## 2017-03-31 PROCEDURE — 83020 HEMOGLOBIN ELECTROPHORESIS: CPT

## 2017-03-31 PROCEDURE — 84165 PROTEIN E-PHORESIS SERUM: CPT | Mod: 26,,, | Performed by: PATHOLOGY

## 2017-03-31 PROCEDURE — 83540 ASSAY OF IRON: CPT

## 2017-03-31 NOTE — LETTER
March 31, 2017      Anita Gandara MD  [address] [place] [address]           [facility] Hemotology On[facility]  [address] [place] [address]  Phone: [phone]  Fax: [phone]          Patient: Yolanda Betts   MR Number: [id number]   YOB: 1970   Date of Visit: 3/31/2017       Dear Dr. Anita Gandara:    Thank you for referring Yolanda Betts to me for evaluation. Attached you will find relevant portions of my assessment and plan of care.    If you have questions, please do not hesitate to call me. I look forward to following Yolanda Betts along with you.    Sincerely,    Alex Barbour MD    Enclosure  CC:  No Recipients    If you would like to receive this communication electronically, please contact [email or web address] or [phone] to request more information on [facility] Link access.    For providers and/or their staff who would like to refer a patient to [facility], please contact us through our one-stop-shop provider referral line, [facility], at [phone].    If you feel you have received this communication in error or would no longer like to receive these types of communications, please e-mail [email or web address]

## 2017-03-31 NOTE — PROGRESS NOTES
MAIN COMPLAINT:  We are asked by Dr. Tona Kessler to evaluate this   46-year-old  lady in regards to her anemia.    HISTORY OF PRESENT ILLNESS:  This 46-year-old  lady had a CBC   done on 2017 that was reported as showing a hemoglobin of 9.8 with an MCV   of 83, white cell count was 6890 (normal differential), platelet count was   277,000.    Chemistries were unremarkable except for an elevated blood sugar of 150.    In 2016, her hemoglobin was 10.8.    The patient is status post hysterectomy.    ALLERGIES:  The patient is allergic or intolerant to Lortab, morphine, Seconal,   Tylox and neuromuscular blockers ( steroids).    PREVIOUS SURGERIES:  Two C-sections, three breast reductions, oral surgery,   hysterectomy.    SOCIAL HISTORY:  She is  with two children.  She lives in [place].    She does not smoke.  Denies significant drinking.  She is a wound care nurse.    FAMILY HISTORY:  Father had cancer of the bladder.  Paternal grandfather had   cancer of the colon.  Mother, maternal aunt and maternal grandmother had   diabetes.  Maternal uncle had coronary bypass surgery and mother had coronary   artery disease.    PAST MEDICAL HISTORY:  1.  Obesity.  2.  High blood pressure.  3.  History of H. pylori gastric infection.  4.  Diabetes mellitus on insulin.  5.  Chronic anemia.  6.  Chronic headaches.    REVIEW OF SYSTEMS:  GENERAL:  No weight loss or fatigue.  EYES:  Her eyes have some poor vision.  OROPHARYNX:  No difficulty or pain on swallowing.  ENDOCRINE:  No heat or cold intolerance.  LUNGS:  No shortness of breath or coughing.  CARDIOVASCULAR:  No chest pains or palpitation.  GASTROINTESTINAL:  No nausea, vomiting or diarrhea.  No history of hepatitis or   jaundice.  He had a colonoscopy in  because of severe constipation.  GENITOURINARY:   2, para 2, status post hysterectomy for benign reasons.  SKIN:  No tenderness or  lumps.  BREASTS:  She had a mammogram that required having it repeated in six months,   but apparently, she had been told that she needs to have them every year.  No   lumps at this time.  MUSCULOSKELETAL:  No neck, hip or back pain.  PSYCHIATRIC:  No mood swings or depression.  NEUROLOGIC:  Has chronic headaches, on Topamax.    PHYSICAL EXAMINATION:  GENERAL:  She was well groomed.  She interacted normally during the interview   VITAL SIGNS:  Weight 229 pounds, blood pressure 130/80, pulse 109, respirations   18, temperature 98.7, height 4 feet 8 inches.  EYES:  No jaundice or paleness.  OROPHARYNX:  No ulcers.  No exudates.  ENDOCRINE:  No palpable thyroid.  LYMPHATICS:  No cervical or supraclavicular adenopathy.  NECK:  No jugular venous distension or masses.  LUNGS:  Clear and well ventilated without rales, wheezes, or rhonchi.  CARDIOVASCULAR:  The heart was rhythmic.  There were no murmurs or gallops.  ABDOMEN:  Obese.  No obvious hepatosplenomegaly, guarding or rebound.  EXTREMITIES:  A 1+ edema bilaterally.  Good dorsalis pedis and posterior   tibialis pulses.  SKIN:  No rashes or ecchymosis.  NEUROLOGIC:  Coordination, strength and gait were normal.  PSYCHIATRIC:  Mood was appropriate.  Lucid and oriented x3.    DISCUSSION:  This patient has moderate anemia.  She will have blood drawn today   for a ferritin, TIBC, B12, folic acid, serum protein electrophoresis, LDH and   haptoglobin.  She will see me in about a week and final recommendations will   depend on the findings.      RDF/PN  dd: 03/31/2017 16:43:53 (CDT)  td: 03/31/2017 17:50:04 (CDT)  Doc ID   #[id number]  Job ID #[id number]    CC: Tona Kessler M.D.

## 2017-04-01 LAB
FERRITIN SERPL-MCNC: 35 NG/ML
FOLATE SERPL-MCNC: 10 NG/ML
HAPTOGLOB SERPL-MCNC: 318 MG/DL
IRON SERPL-MCNC: 39 UG/DL
LDH SERPL L TO P-CCNC: 219 U/L
SATURATED IRON: 8 %
TOTAL IRON BINDING CAPACITY: 496 UG/DL
TRANSFERRIN SERPL-MCNC: 335 MG/DL
VIT B12 SERPL-MCNC: 426 PG/ML

## 2017-04-03 LAB
ALBUMIN SERPL ELPH-MCNC: 3.67 G/DL
ALPHA1 GLOB SERPL ELPH-MCNC: 0.34 G/DL
ALPHA2 GLOB SERPL ELPH-MCNC: 0.91 G/DL
B-GLOBULIN SERPL ELPH-MCNC: 1.06 G/DL
GAMMA GLOB SERPL ELPH-MCNC: 1.32 G/DL
HGB A2 MFR BLD HPLC: 2.2 %
HGB FRACT BLD ELPH-IMP: NORMAL
HGB FRACT BLD ELPH-IMP: NORMAL
PATHOLOGIST INTERPRETATION SPE: NORMAL
PROT SERPL-MCNC: 7.3 G/DL

## 2017-04-07 ENCOUNTER — OFFICE VISIT (OUTPATIENT)
Dept: HEMATOLOGY/ONCOLOGY | Facility: CLINIC | Age: 47
End: 2017-04-07
Payer: COMMERCIAL

## 2017-04-07 VITALS
SYSTOLIC BLOOD PRESSURE: 120 MMHG | BODY MASS INDEX: 51.43 KG/M2 | RESPIRATION RATE: 18 BRPM | WEIGHT: 228.63 LBS | HEIGHT: 56 IN | HEART RATE: 95 BPM | DIASTOLIC BLOOD PRESSURE: 78 MMHG | TEMPERATURE: 98 F | OXYGEN SATURATION: 95 %

## 2017-04-07 DIAGNOSIS — D50.0 IRON DEFICIENCY ANEMIA DUE TO CHRONIC BLOOD LOSS: Primary | ICD-10-CM

## 2017-04-07 PROCEDURE — 3078F DIAST BP <80 MM HG: CPT | Mod: S$GLB,,, | Performed by: INTERNAL MEDICINE

## 2017-04-07 PROCEDURE — 99213 OFFICE O/P EST LOW 20 MIN: CPT | Mod: S$GLB,,, | Performed by: INTERNAL MEDICINE

## 2017-04-07 PROCEDURE — 99999 PR PBB SHADOW E&M-EST. PATIENT-LVL V: CPT | Mod: PBBFAC,,, | Performed by: INTERNAL MEDICINE

## 2017-04-07 PROCEDURE — 3074F SYST BP LT 130 MM HG: CPT | Mod: S$GLB,,, | Performed by: INTERNAL MEDICINE

## 2017-04-07 PROCEDURE — 1160F RVW MEDS BY RX/DR IN RCRD: CPT | Mod: S$GLB,,, | Performed by: INTERNAL MEDICINE

## 2017-04-07 RX ORDER — IRON,CARBONYL/ASCORBIC ACID 100-250 MG
1 TABLET ORAL DAILY
Qty: 30 TABLET | Refills: 3 | Status: SHIPPED | OUTPATIENT
Start: 2017-04-07 | End: 2017-12-21

## 2017-04-07 NOTE — PROGRESS NOTES
Subjective:       Patient ID: Yolanda Betts is a 46 y.o. female.    Chief Complaint: Follow-up    HPI This 46-year-old  lady had a CBC   done on 03/17/2017 that was reported as showing a hemoglobin of 9.8 with an MCV   of 83, white cell count was 6890 (normal differential), platelet count was   277,000.     Chemistries were unremarkable except for an elevated blood sugar of 150.     In November 2016, her hemoglobin was 10.8.     The patient is status post hysterectomy in 2006.  i ordered some lab tests that have inclduded normal folate, B12, , haptoglobin, LDH.  Saturated iron  was low at 8 and TIBC was up at 496.  Ferritin was 36   ALLERGIES: The patient is allergic or intolerant to Lortab, morphine, Seconal,   Tylox and neuromuscular blockers ( steroids).     PREVIOUS SURGERIES: Two C-sections, three breast reductions, oral surgery,   hysterectomy.     SOCIAL HISTORY: She is  with two children. She lives in [place].   She does not smoke. Denies significant drinking. She is a wound care nurse.     FAMILY HISTORY: Father had cancer of the bladder. Paternal grandfather had   cancer of the colon. Mother, maternal aunt and maternal grandmother had   diabetes. Maternal uncle had coronary bypass surgery and mother had coronary   artery disease.  Three femake frst cousins had breast cancer, another one had ovarian cancer. Two first cousins had brain cancer     PAST MEDICAL HISTORY:  1. Obesity.  2. High blood pressure.  3. History of H. pylori gastric infection.  4. Diabetes mellitus on insulin.  5. Chronic anemia.  6. Chronic headaches.    Review of Systems   Constitutional: Positive for unexpected weight change.        Lost 16 pounds in a month   Eyes: Positive for visual disturbance.        Seen by Opthalmologist who changed her rpescription for glasses   Respiratory: Negative for cough.         Moderate SOB on exertion   Cardiovascular: Positive for chest pain.   Gastrointestinal: Negative  for diarrhea.   Genitourinary: Negative for frequency.   Musculoskeletal: Negative for back pain.   Skin: Negative for rash.   Neurological: Positive for headaches.        Suffr\ers from midgraines   Hematological: Negative for adenopathy.   Psychiatric/Behavioral: The patient is nervous/anxious.          Objective:      Physical Exam   Constitutional: She is oriented to person, place, and time. She appears well-developed. No distress.   HENT:   Head: Normocephalic.   Right Ear: Tympanic membrane and ear canal normal.   Left Ear: Tympanic membrane and ear canal normal.   Nose: Right sinus exhibits no maxillary sinus tenderness and no frontal sinus tenderness. Left sinus exhibits no maxillary sinus tenderness and no frontal sinus tenderness.   Mouth/Throat: Mucous membranes are normal.   Teeth normal.  Gums normal.   Eyes: Lids are normal. Pupils are equal, round, and reactive to light.   Neck: Normal range of motion. Normal carotid pulses and no hepatojugular reflux present. Carotid bruit is not present. No thyroid mass present.   Cardiovascular: Normal rate, S1 normal and S2 normal.  Exam reveals no friction rub.    Carotid exam normal   Pulmonary/Chest: Effort normal. No accessory muscle usage. No respiratory distress.   Abdominal: Normal appearance. There is no splenomegaly or hepatomegaly. There is no tenderness. There is no rebound.   Musculoskeletal: Normal range of motion. She exhibits no tenderness.        Right hand: Normal.        Left hand: Normal.       Lymphadenopathy:     She has no axillary adenopathy.        Right: No inguinal and no supraclavicular adenopathy present.        Left: No inguinal and no supraclavicular adenopathy present.   Neurological: She is alert and oriented to person, place, and time. She has normal strength.   Skin: Skin is warm and dry. She is not diaphoretic. No cyanosis. No pallor. Nails show no clubbing.   Psychiatric: She has a normal mood and affect. Her behavior is normal.  Judgment and thought content normal.     Wt Readings from Last 3 Encounters:   04/07/17 103.7 kg (228 lb 9.9 oz)   03/31/17 103.9 kg (229 lb 0.9 oz)   03/24/17 106.3 kg (234 lb 5.6 oz)     Temp Readings from Last 3 Encounters:   04/07/17 98 °F (36.7 °C) (Oral)   03/31/17 98.7 °F (37.1 °C) (Oral)   03/17/17 97.3 °F (36.3 °C) (Tympanic)     BP Readings from Last 3 Encounters:   04/07/17 120/78   03/31/17 136/80   03/24/17 (!) 160/91     Pulse Readings from Last 3 Encounters:   04/07/17 95   03/31/17 109   03/24/17 94         Assessment:       1. Iron deficiency anemia due to chronic blood loss        Plan:       The decreased saturated iron level of 8 and the elevated TIBC of 496 point toward an element of iron deficiency.  i will start her on ICAR c one tbalt a day and ask her to see me in 3 m [facility] with a cbc, ferritin and tibc.  She si s/p hysterectomy, so i will have her undergo an EGD/colonsocopy

## 2017-04-07 NOTE — LETTER
April 7, 2017      LINO Blanco  [address] [facility] [place] [address]           [facility] Hemotology [facility]  [address]  [place] [address]  Phone: [phone]  Fax: [phone]          Patient: Yolanda Betts   MR Number: [id number]   YOB: 1970   Date of Visit: 4/7/2017       Dear Bel Holley:    Thank you for referring Yolanda Betts to me for evaluation. Attached you will find relevant portions of my assessment and plan of care.    If you have questions, please do not hesitate to call me. I look forward to following Yolanda Betts along with you.    Sincerely,    Alex Barbour MD    Enclosure  CC:  No Recipients    If you would like to receive this communication electronically, please contact [email or web address] or [phone] to request more information on tagWALLET Link access.    For providers and/or their staff who would like to refer a patient to [facility], please contact us through our one-stop-shop provider referral line, [facility] , at [phone].    If you feel you have received this communication in error or would no longer like to receive these types of communications, please e-mail [email or web address]

## 2017-04-10 RX ORDER — SODIUM, POTASSIUM,MAG SULFATES 17.5-3.13G
SOLUTION, RECONSTITUTED, ORAL ORAL
Qty: 354 ML | Refills: 0 | Status: ON HOLD | OUTPATIENT
Start: 2017-04-10 | End: 2018-02-27 | Stop reason: HOSPADM

## 2017-04-13 ENCOUNTER — TELEPHONE (OUTPATIENT)
Dept: PULMONOLOGY | Facility: CLINIC | Age: 47
End: 2017-04-13

## 2017-04-13 DIAGNOSIS — J45.41 MODERATE PERSISTENT ASTHMA WITH (ACUTE) EXACERBATION: Primary | ICD-10-CM

## 2017-04-13 RX ORDER — GABAPENTIN 300 MG/1
CAPSULE ORAL
Qty: 90 CAPSULE | Refills: 0 | Status: SHIPPED | OUTPATIENT
Start: 2017-04-13 | End: 2017-06-28 | Stop reason: SDUPTHER

## 2017-04-13 RX ORDER — BUDESONIDE AND FORMOTEROL FUMARATE DIHYDRATE 160; 4.5 UG/1; UG/1
2 AEROSOL RESPIRATORY (INHALATION) EVERY 12 HOURS
Qty: 1 INHALER | Refills: 12 | Status: SHIPPED | OUTPATIENT
Start: 2017-04-13 | End: 2017-07-17

## 2017-04-13 RX ORDER — PREDNISONE 20 MG/1
TABLET ORAL
Qty: 20 TABLET | Refills: 0 | Status: SHIPPED | OUTPATIENT
Start: 2017-04-13 | End: 2017-07-17

## 2017-04-13 NOTE — TELEPHONE ENCOUNTER
Telephoned patient she improved her wheezing with 2 albuterol neb treatments.  Her current insurance no longer covers her advair inhaler and was going to cost her $250 out of pocket.   Advised would send out script for Symbicort 2 puffs twice a day ant there is a 0  co pay coupon she could  if not able to obtain coupon on line.   If Symbicort is not covered then she needed to let us know so could try another alternative.   Also provided prednisone taper to begin for acute flare since off of Advair over past week.     If not continued improved she is to come in for evaluation or report to nearest urgent care  Or ER.

## 2017-04-13 NOTE — TELEPHONE ENCOUNTER
----- Message from Lee Jimenez sent at 4/13/2017  8:11 AM CDT -----  Contact: Pt  Pt request call from nurse because she is having an asthma attack, I called [facility] on call, her pcp dept and pulmonary dept with no answer so I referred pt to the ER, message sent per pt, please contact pt at [phone]

## 2017-04-13 NOTE — TELEPHONE ENCOUNTER
Patient called stating that she was having and asthma attack she is currently at work taken a breathing treatment.

## 2017-04-17 RX ORDER — LANCETS 33 GAUGE
1 EACH MISCELLANEOUS 3 TIMES DAILY
Qty: 100 EACH | Refills: 11 | Status: SHIPPED | OUTPATIENT
Start: 2017-04-17 | End: 2019-05-02 | Stop reason: ALTCHOICE

## 2017-04-17 RX ORDER — INSULIN PUMP SYRINGE, 3 ML
EACH MISCELLANEOUS
Qty: 1 EACH | Refills: 0 | Status: SHIPPED | OUTPATIENT
Start: 2017-04-17 | End: 2019-05-02

## 2017-04-17 NOTE — TELEPHONE ENCOUNTER
Contacted patient and informed her one touch verio is not covered under her formulary, and a new Rx will be sent to her pharmacy for the diabetic testing supplies that is covered by her insurance company. Patient voiced understanding. Rx sent.

## 2017-05-07 RX ORDER — HYDRALAZINE HYDROCHLORIDE 10 MG/1
TABLET, FILM COATED ORAL
Qty: 90 TABLET | Refills: 0 | Status: SHIPPED | OUTPATIENT
Start: 2017-05-07 | End: 2017-07-13 | Stop reason: SDUPTHER

## 2017-05-11 ENCOUNTER — TELEPHONE (OUTPATIENT)
Dept: PULMONOLOGY | Facility: CLINIC | Age: 47
End: 2017-05-11

## 2017-05-11 DIAGNOSIS — J45.41 MODERATE PERSISTENT ASTHMA WITH (ACUTE) EXACERBATION: Primary | ICD-10-CM

## 2017-05-11 RX ORDER — ALBUTEROL SULFATE 90 UG/1
2 AEROSOL, METERED RESPIRATORY (INHALATION) EVERY 6 HOURS PRN
Qty: 18 G | Refills: 11 | Status: SHIPPED | OUTPATIENT
Start: 2017-05-11 | End: 2017-08-31

## 2017-05-11 NOTE — TELEPHONE ENCOUNTER
Pt states she is currently having an asthma attack and does not have an inhaler - she states she cannot afford the $500 for ProAir. Can order be changed to Ventolin - insurance will usually  pay for that.    Encouraged pt to go to ER for tx ASAP.

## 2017-05-11 NOTE — TELEPHONE ENCOUNTER
----- Message from Nancy Julio sent at 5/11/2017  9:53 AM CDT -----  Patient states that she is having an asthma attack and she needs to come and  a rescue inhaler.   Call her at [phone].                                                felix

## 2017-06-28 RX ORDER — GABAPENTIN 300 MG/1
CAPSULE ORAL
Qty: 90 CAPSULE | Refills: 0 | Status: SHIPPED | OUTPATIENT
Start: 2017-06-28 | End: 2017-08-03 | Stop reason: SDUPTHER

## 2017-07-10 ENCOUNTER — TELEPHONE (OUTPATIENT)
Dept: HEMATOLOGY/ONCOLOGY | Facility: CLINIC | Age: 47
End: 2017-07-10

## 2017-07-13 RX ORDER — HYDRALAZINE HYDROCHLORIDE 10 MG/1
TABLET, FILM COATED ORAL
Qty: 90 TABLET | Refills: 6 | Status: SHIPPED | OUTPATIENT
Start: 2017-07-13 | End: 2018-02-26

## 2017-07-17 ENCOUNTER — PROCEDURE VISIT (OUTPATIENT)
Dept: PULMONOLOGY | Facility: CLINIC | Age: 47
End: 2017-07-17
Payer: MEDICAID

## 2017-07-17 ENCOUNTER — LAB VISIT (OUTPATIENT)
Dept: LAB | Facility: HOSPITAL | Age: 47
End: 2017-07-17
Attending: INTERNAL MEDICINE
Payer: MEDICAID

## 2017-07-17 ENCOUNTER — OFFICE VISIT (OUTPATIENT)
Dept: PULMONOLOGY | Facility: CLINIC | Age: 47
End: 2017-07-17
Payer: MEDICAID

## 2017-07-17 VITALS
OXYGEN SATURATION: 98 % | HEIGHT: 56 IN | SYSTOLIC BLOOD PRESSURE: 142 MMHG | BODY MASS INDEX: 49.49 KG/M2 | WEIGHT: 220 LBS | RESPIRATION RATE: 18 BRPM | DIASTOLIC BLOOD PRESSURE: 80 MMHG | HEART RATE: 111 BPM

## 2017-07-17 DIAGNOSIS — R07.9 CHEST PAIN, UNSPECIFIED TYPE: ICD-10-CM

## 2017-07-17 DIAGNOSIS — J45.40 MODERATE PERSISTENT ASTHMA WITHOUT COMPLICATION: ICD-10-CM

## 2017-07-17 DIAGNOSIS — F51.04 PSYCHOPHYSIOLOGICAL INSOMNIA: ICD-10-CM

## 2017-07-17 DIAGNOSIS — J45.41 MODERATE PERSISTENT ASTHMA WITH ACUTE EXACERBATION: ICD-10-CM

## 2017-07-17 DIAGNOSIS — G47.33 OSA ON CPAP: Primary | Chronic | ICD-10-CM

## 2017-07-17 DIAGNOSIS — R94.2 DIFFUSION CAPACITY OF LUNG (DL), DECREASED: Chronic | ICD-10-CM

## 2017-07-17 DIAGNOSIS — E66.01 OBESITY, MORBID, BMI 40.0-49.9: ICD-10-CM

## 2017-07-17 DIAGNOSIS — F31.9 BIPOLAR 1 DISORDER: ICD-10-CM

## 2017-07-17 LAB
D DIMER PPP IA.FEU-MCNC: 0.29 MG/L FEU
IGE SERPL-ACNC: <35 IU/ML

## 2017-07-17 PROCEDURE — 94726 PLETHYSMOGRAPHY LUNG VOLUMES: CPT | Mod: 26,S$PBB,, | Performed by: INTERNAL MEDICINE

## 2017-07-17 PROCEDURE — 86003 ALLG SPEC IGE CRUDE XTRC EA: CPT | Mod: 59

## 2017-07-17 PROCEDURE — 99214 OFFICE O/P EST MOD 30 MIN: CPT | Mod: 25,S$PBB,, | Performed by: INTERNAL MEDICINE

## 2017-07-17 PROCEDURE — 86003 ALLG SPEC IGE CRUDE XTRC EA: CPT

## 2017-07-17 PROCEDURE — 85379 FIBRIN DEGRADATION QUANT: CPT

## 2017-07-17 PROCEDURE — 94060 EVALUATION OF WHEEZING: CPT | Mod: 26,S$PBB,, | Performed by: INTERNAL MEDICINE

## 2017-07-17 PROCEDURE — 99999 PR PBB SHADOW E&M-EST. PATIENT-LVL V: CPT | Mod: PBBFAC,,, | Performed by: INTERNAL MEDICINE

## 2017-07-17 PROCEDURE — 94729 DIFFUSING CAPACITY: CPT | Mod: 26,S$PBB,, | Performed by: INTERNAL MEDICINE

## 2017-07-17 PROCEDURE — 82785 ASSAY OF IGE: CPT

## 2017-07-17 PROCEDURE — 36415 COLL VENOUS BLD VENIPUNCTURE: CPT | Mod: PO

## 2017-07-17 RX ORDER — FLUTICASONE PROPIONATE AND SALMETEROL 250; 50 UG/1; UG/1
1 POWDER RESPIRATORY (INHALATION)
COMMUNITY
Start: 2017-06-15 | End: 2017-08-31 | Stop reason: SDUPTHER

## 2017-07-17 RX ORDER — PEN NEEDLE, DIABETIC 31 GX5/16"
NEEDLE, DISPOSABLE MISCELLANEOUS
Refills: 7 | COMMUNITY
Start: 2017-07-13 | End: 2018-03-19 | Stop reason: SDUPTHER

## 2017-07-17 RX ORDER — AZELASTINE 1 MG/ML
SPRAY, METERED NASAL
Refills: 0 | COMMUNITY
Start: 2017-04-22 | End: 2017-08-31 | Stop reason: HOSPADM

## 2017-07-17 RX ORDER — CYCLOBENZAPRINE HCL 10 MG
TABLET ORAL
Refills: 1 | COMMUNITY
Start: 2017-07-13 | End: 2018-02-26

## 2017-07-17 RX ORDER — MIRTAZAPINE 15 MG/1
30 TABLET, FILM COATED ORAL NIGHTLY
Refills: 1 | COMMUNITY
Start: 2017-07-13 | End: 2017-08-31

## 2017-07-17 NOTE — ASSESSMENT & PLAN NOTE
Remeron  Also prescibed for BIPOLAR  Sleep latency delayed to 1-2 am if doesn't take meds  Prescribed by Dr Toño Cooney

## 2017-07-17 NOTE — LETTER
July 17, 2017      Linda Fernandez, NP  [address] [facility] [address] [place] [address]           [facility] - Pulmonary Services  [address] [place] [address]  Phone: [phone]  Fax: [phone]          Patient: Yolanda Betts   MR Number: [id number]   YOB: 1970   Date of Visit: 7/17/2017       Dear Linda Fernandez:    Thank you for referring Yolanda Betts to me for evaluation. Attached you will find relevant portions of my assessment and plan of care.    If you have questions, please do not hesitate to call me. I look forward to following Yolanda Betts along with you.    Sincerely,    Sylvain Rainey MD    Enclosure  CC:  No Recipients    If you would like to receive this communication electronically, please contact [email or web address] or [phone] to request more information on Provade Link access.    For providers and/or their staff who would like to refer a patient to [facility], please contact us through our one-stop-shop provider referral line, [facility] , at [phone].    If you feel you have received this communication in error or would no longer like to receive these types of communications, please e-mail [email or web address]
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
## 2017-07-17 NOTE — ASSESSMENT & PLAN NOTE
Prior echo and stress test last year was normal  symptoms with activity going up stairs    Resting tachycardia    PPD was -ve Jan 2017  Hx of Pleurisy

## 2017-07-17 NOTE — ASSESSMENT & PLAN NOTE
[place] score 15  CPAP 6.0 cm  Bed time 9-10pm  Wake time 7-8 am  Occasional sleepy driving  Needs Download  May need Provigil/ Nuvigil for sleepiness after treated ALEN

## 2017-07-18 ENCOUNTER — TELEPHONE (OUTPATIENT)
Dept: PULMONOLOGY | Facility: CLINIC | Age: 47
End: 2017-07-18

## 2017-07-19 LAB
A ALTERNATA IGE QN: <0.35 KU/L
A FUMIGATUS IGE QN: <0.35 KU/L
A FUMIGATUS IGE QN: <0.35 KU/L
ALLERGEN BOXELDER MAPLE TREE IGE: <0.35 KU/L
ALLERGEN MAPLE (BOX ELDER) CLASS: NORMAL
ALLERGEN MULBERRY CLASS: NORMAL
ALLERGEN MULBERRY TREE IGE: <0.35 KU/L
ALLERGEN PENICILLIUM IGE: <0.35 KU/L
ALLERGEN WHITE ASH TREE IGE: <0.35 KU/L
AMER SYCAMORE IGE QN: <0.35 KU/L
BALD CYPRESS IGE QN: <0.35 KU/L
BERMUDA GRASS IGE QN: <0.35 KU/L
C HERBARUM IGE QN: <0.35 KU/L
CAT DANDER IGE QN: <0.35 KU/L
CEDAR IGE QN: <0.35 KU/L
CMN PIGWEED IGE QN: <0.35 KU/L
COCKLEBUR IGE QN: <0.35 KU/L
COMMON RAGWEED IGE QN: <0.35 KU/L
D FARINAE IGE QN: <0.35 KU/L
D PTERONYSS IGE QN: <0.35 KU/L
DEPRECATED A ALTERNATA IGE RAST QL: NORMAL
DEPRECATED A FUMIGATUS IGE RAST QL: NORMAL
DEPRECATED A FUMIGATUS IGE RAST QL: NORMAL
DEPRECATED BALD CYPRESS IGE RAST QL: NORMAL
DEPRECATED BERMUDA GRASS IGE RAST QL: NORMAL
DEPRECATED C HERBARUM IGE RAST QL: NORMAL
DEPRECATED CAT DANDER IGE RAST QL: NORMAL
DEPRECATED CEDAR IGE RAST QL: NORMAL
DEPRECATED COCKLEBUR IGE RAST QL: NORMAL
DEPRECATED COMMON PIGWEED IGE RAST QL: NORMAL
DEPRECATED COMMON RAGWEED IGE RAST QL: NORMAL
DEPRECATED D FARINAE IGE RAST QL: NORMAL
DEPRECATED D PTERONYSS IGE RAST QL: NORMAL
DEPRECATED DOG DANDER IGE RAST QL: NORMAL
DEPRECATED ENGL PLANTAIN IGE RAST QL: NORMAL
DEPRECATED GOOSEFOOT IGE RAST QL: NORMAL
DEPRECATED JOHNSON GRASS IGE RAST QL: NORMAL
DEPRECATED KENT BLUE GRASS IGE RAST QL: NORMAL
DEPRECATED M RACEMOSUS IGE RAST QL: NORMAL
DEPRECATED MARSH ELDER IGE RAST QL: NORMAL
DEPRECATED MUGWORT IGE RAST QL: NORMAL
DEPRECATED NETTLE IGE RAST QL: NORMAL
DEPRECATED PECAN/HICK TREE IGE RAST QL: NORMAL
DEPRECATED ROACH IGE RAST QL: NORMAL
DEPRECATED SHEEP SORREL IGE RAST QL: NORMAL
DEPRECATED SILVER BIRCH IGE RAST QL: NORMAL
DEPRECATED TIMOTHY IGE RAST QL: NORMAL
DEPRECATED WHITE OAK IGE RAST QL: NORMAL
DOG DANDER IGE QN: <0.35 KU/L
ELM CEDAR CLASS: NORMAL
ELM CEDAR, IGE: <0.35 KU/L
ENGL PLANTAIN IGE QN: <0.35 KU/L
FEATHER PANEL #2: <0.35 KU/L
GOOSEFOOT IGE QN: <0.35 KU/L
JOHNSON GRASS IGE QN: <0.35 KU/L
KENT BLUE GRASS IGE QN: <0.35 KU/L
M RACEMOSUS IGE QN: <0.35 KU/L
MARSH ELDER IGE QN: <0.35 KU/L
MUGWORT IGE QN: <0.35 KU/L
NETTLE IGE QN: <0.35 KU/L
PECAN/HICK TREE IGE QN: <0.35 KU/L
PENICILLIUM CLASS: NORMAL
ROACH IGE QN: <0.35 KU/L
SHEEP SORREL IGE QN: <0.35 KU/L
SILVER BIRCH IGE QN: <0.35 KU/L
TIMOTHY IGE QN: <0.35 KU/L
WHITE ASH CLASS: NORMAL
WHITE OAK IGE QN: <0.35 KU/L

## 2017-07-20 LAB
PRE DLCO: 15.15 ML/MMHG/MIN (ref 20.71–29)
PRE ERV: 0.06 L
PRE FEF 25 75: 2.07 L/S (ref 1.7–2.86)
PRE FET 100: 6.96 S
PRE FEV1 FVC: 84 %
PRE FEV1: 1.61 L (ref 1.72–2.23)
PRE FIF 50: 1.4 L/S
PRE FRC PL: 1.07 L (ref 0.99–1.94)
PRE FVC: 1.92 L (ref 2.12–2.72)
PRE KROGHS K: 4.7 1/MIN
PRE PEF: 3.58 L/S (ref 4.74–6.43)
PRE RV: 1.04 L (ref 0.91–1.61)
PRE SVC: 2.23 L
PRE TLC: 3.27 L (ref 3.52–4.29)
PREDICTED DLCO: 24.86 ML/MMHG/MIN (ref 20.71–29)
PREDICTED FEV1 FVC: 81.25 % (ref 76.35–86.14)
PREDICTED FEV1: 1.98 L (ref 1.72–2.23)
PREDICTED FRC N2: 1.46 L (ref 0.99–1.94)
PREDICTED FRC PL: 1.46 L (ref 0.99–1.94)
PREDICTED FVC: 2.42 L (ref 2.12–2.72)
PREDICTED RV: 1.26 L (ref 0.91–1.61)
PREDICTED SVC: 2.66 L
PREDICTED TLC: 3.91 L (ref 3.52–4.29)
PROVOCATION PROTOCOL: ABNORMAL

## 2017-07-24 ENCOUNTER — TELEPHONE (OUTPATIENT)
Dept: RADIOLOGY | Facility: HOSPITAL | Age: 47
End: 2017-07-24

## 2017-07-25 ENCOUNTER — HOSPITAL ENCOUNTER (OUTPATIENT)
Dept: RADIOLOGY | Facility: HOSPITAL | Age: 47
Discharge: HOME | End: 2017-07-25
Attending: INTERNAL MEDICINE
Payer: MEDICAID

## 2017-07-25 DIAGNOSIS — R07.9 CHEST PAIN, UNSPECIFIED TYPE: ICD-10-CM

## 2017-07-25 DIAGNOSIS — R94.2 DIFFUSION CAPACITY OF LUNG (DL), DECREASED: Chronic | ICD-10-CM

## 2017-07-25 PROCEDURE — 25500020 PHARM REV CODE 255: Mod: PO | Performed by: INTERNAL MEDICINE

## 2017-07-25 PROCEDURE — 71260 CT THORAX DX C+: CPT | Mod: 26,,, | Performed by: RADIOLOGY

## 2017-07-25 PROCEDURE — 71260 CT THORAX DX C+: CPT | Mod: TC,PO

## 2017-07-25 RX ADMIN — IOHEXOL 75 ML: 350 INJECTION, SOLUTION INTRAVENOUS at 08:07

## 2017-08-01 RX ORDER — FLUTICASONE PROPIONATE 50 MCG
SPRAY, SUSPENSION (ML) NASAL
Qty: 1 BOTTLE | Refills: 1 | Status: SHIPPED | OUTPATIENT
Start: 2017-08-01 | End: 2018-02-26

## 2017-08-01 NOTE — TELEPHONE ENCOUNTER
"Pt stated she has elevated BP and headache and "just not feeling well."  Notified pt that Dr. Stafford does not have any available appt today.  Searched for an appt with another provider; first available 8/3/17 at 2:20 pm with Mr. Sampson.  Advised pt that she should go to urgent care or ER and that someone should drive her.  Pt verbalized understanding but also requested appt on 8/3/17 with Mr. Sampson.  "

## 2017-08-01 NOTE — TELEPHONE ENCOUNTER
----- Message from Lora Zimmerman sent at 8/1/2017  1:31 PM CDT -----  Contact: [phone]  Patient is having a headache,feeling weak,and blood pressure has been high. She is requesting same day access. Please call back at [phone].      Thanks,  Lora Zimmerman

## 2017-08-03 ENCOUNTER — OFFICE VISIT (OUTPATIENT)
Dept: INTERNAL MEDICINE | Facility: CLINIC | Age: 47
End: 2017-08-03
Payer: MEDICAID

## 2017-08-03 VITALS
DIASTOLIC BLOOD PRESSURE: 90 MMHG | TEMPERATURE: 98 F | BODY MASS INDEX: 51.08 KG/M2 | WEIGHT: 227.06 LBS | SYSTOLIC BLOOD PRESSURE: 158 MMHG | HEIGHT: 56 IN

## 2017-08-03 DIAGNOSIS — R00.0 TACHYCARDIA WITH HEART RATE 121-140 BEATS PER MINUTE: ICD-10-CM

## 2017-08-03 DIAGNOSIS — R00.2 PALPITATIONS: ICD-10-CM

## 2017-08-03 DIAGNOSIS — I10 ESSENTIAL HYPERTENSION: Primary | Chronic | ICD-10-CM

## 2017-08-03 PROBLEM — E78.5 HYPERLIPIDEMIA: Status: ACTIVE | Noted: 2017-08-03

## 2017-08-03 PROBLEM — F31.9 BIPOLAR 1 DISORDER: Chronic | Status: ACTIVE | Noted: 2017-01-31

## 2017-08-03 PROBLEM — E66.01 MORBID OBESITY WITH BMI OF 50.0-59.9, ADULT: Status: ACTIVE | Noted: 2017-08-03

## 2017-08-03 PROBLEM — E66.01 MORBID OBESITY WITH BMI OF 50.0-59.9, ADULT: Chronic | Status: ACTIVE | Noted: 2017-08-03

## 2017-08-03 PROBLEM — E78.5 HYPERLIPIDEMIA: Chronic | Status: ACTIVE | Noted: 2017-08-03

## 2017-08-03 PROBLEM — F41.9 ANXIETY: Status: ACTIVE | Noted: 2017-08-03

## 2017-08-03 PROCEDURE — 99999 PR PBB SHADOW E&M-EST. PATIENT-LVL V: CPT | Mod: PBBFAC,,, | Performed by: PHYSICIAN ASSISTANT

## 2017-08-03 PROCEDURE — 3008F BODY MASS INDEX DOCD: CPT | Mod: ,,, | Performed by: PHYSICIAN ASSISTANT

## 2017-08-03 PROCEDURE — 99213 OFFICE O/P EST LOW 20 MIN: CPT | Mod: S$PBB,,, | Performed by: PHYSICIAN ASSISTANT

## 2017-08-03 PROCEDURE — 99215 OFFICE O/P EST HI 40 MIN: CPT | Mod: PBBFAC,PO | Performed by: PHYSICIAN ASSISTANT

## 2017-08-03 RX ORDER — METOPROLOL SUCCINATE 50 MG/1
50 TABLET, EXTENDED RELEASE ORAL DAILY
Qty: 30 TABLET | Refills: 11 | Status: SHIPPED | OUTPATIENT
Start: 2017-08-03 | End: 2018-02-26

## 2017-08-03 NOTE — PROGRESS NOTES
Subjective:       Patient ID: Yolanda Betts is a 47 y.o.B/ female.    Chief Complaint: Diarrhea and Hypertension    HPI         She comes in by herself today and has the above problem.  She's not really concerned about  She has been checking her blood pressure at home and it has  Somewhere in the 170s/high 90s for the past 3-4 weeks.  She says she has not missed any of her blood pressure pills and has been taking them just like  She is supposed to.  She is under a little bit of stress right now because her mother is in the ICU at the [facility] and has been there for about a week.  Her mother doesn't have anything life- threatening right now and seems to be recovering well.  This is still put a little stress in her life.  She's also noticed that she has had tachycardia but hasn't really counted the beats per minute.         She denies any problem with dyspnea of any modality, CP, pleurisy, pain in her neck shoulder or arm, GI disturbance of reflux or heartburn, nausea, vomiting, melena, BR RB, hematochezia, fever, chills, sweats, diaphoresis, etc.    Review of Systems    Otherwise negative concerning the NEUROLOGICAL, ENT, PULMONARY, CV, VASCULAR, GI, and renal system review.    Objective:      Physical Exam    NEUROLOGICAL: All within normal limits.  ENT: All normal.  CHEST: Clear BS anterior to posterior with no wheeze, rhonchi, rale, or decreased intensity.  HEART: RSR.  S1 is greater than S2.  There is no murmur or gallop.  Pulse rate was 118 bpm.  It was very regular.  She has no peripheral edema.  Pulses are 3+ in the radial artery and 2+ dorsalis pedis and symmetrical.    Assessment:       1. Essential hypertension    2. Tachycardia with heart rate 121-140 beats per minute    3. Palpitations        Plan:     1.  Continue with her HydroDiuril, Diovan HCT, Apresoline, and Verelan at her same dose.  Also continue with her when necessary dosage of Xanax which she is not using very much of right now.  2.  Add  Toprol 50 mg XL daily to help slow down her heart rate and hopefully her blood pressure.  Recheck next Monday or Tuesday and have her bring her blood pressure unit in with her so we can check memory.

## 2017-08-04 DIAGNOSIS — I10 ESSENTIAL HYPERTENSION: ICD-10-CM

## 2017-08-04 RX ORDER — VALSARTAN AND HYDROCHLOROTHIAZIDE 320; 25 MG/1; MG/1
1 TABLET, FILM COATED ORAL DAILY
Qty: 30 TABLET | Refills: 2 | Status: SHIPPED | OUTPATIENT
Start: 2017-08-04 | End: 2017-09-25

## 2017-08-04 RX ORDER — GABAPENTIN 300 MG/1
CAPSULE ORAL
Qty: 90 CAPSULE | Refills: 0 | Status: SHIPPED | OUTPATIENT
Start: 2017-08-04 | End: 2018-01-17 | Stop reason: SDUPTHER

## 2017-08-04 RX ORDER — LINACLOTIDE 145 UG/1
CAPSULE, GELATIN COATED ORAL
Qty: 30 CAPSULE | Refills: 3 | OUTPATIENT
Start: 2017-08-04

## 2017-08-06 RX ORDER — INSULIN DETEMIR 100 [IU]/ML
26 INJECTION, SOLUTION SUBCUTANEOUS 2 TIMES DAILY
Qty: 1 BOX | Refills: 5 | Status: SHIPPED | OUTPATIENT
Start: 2017-08-06 | End: 2017-09-28 | Stop reason: CLARIF

## 2017-08-22 ENCOUNTER — TELEPHONE (OUTPATIENT)
Dept: INTERNAL MEDICINE | Facility: CLINIC | Age: 47
End: 2017-08-22

## 2017-08-22 NOTE — TELEPHONE ENCOUNTER
----- Message from Tamara Singletary sent at 8/22/2017 11:41 AM CDT -----  Contact: Pt   States she's calling to be worked in to see Dr for med chk/elevated BP/ headaches/ pt can be reached at [phone]//thanks/dbw

## 2017-08-22 NOTE — TELEPHONE ENCOUNTER
Pt stated she needs refill of Diovan.  Notified pt that refill of Diovan was sent to [facility] on 8/4/17.  Pt stated she also needs a f/u appt re: blood pressure.  Stated she was supposed to be seen on 8/7/17 and missed the appt.  Pt scheduled with Mr. Sampson on 8/31/17 at 2:20 pm.

## 2017-08-25 ENCOUNTER — TELEPHONE (OUTPATIENT)
Dept: INTERNAL MEDICINE | Facility: CLINIC | Age: 47
End: 2017-08-25

## 2017-08-25 NOTE — TELEPHONE ENCOUNTER
Pt ID'd by name on v/m.  Notified pt via v/m that first available appt with any provider here at our location is 8/31/17, for which she already has appt scheduled with Johnnie Adrián.  Advised pt via v/m that she can also be seen at .

## 2017-08-25 NOTE — TELEPHONE ENCOUNTER
----- Message from Rashmi Albrecht sent at 8/25/2017  8:37 AM CDT -----  Contact: pt  Please call pt @ [phone] regarding an worked in appt for today, pt is a medication pt, next avaiable not until 9/8, pt have high blood pressure,blood sugar high,diarreaha, nausea.

## 2017-08-25 NOTE — TELEPHONE ENCOUNTER
Pt stated she needs an appt with Dr. Stafford today for elevated BP and glucose levels.  Stated she went to an outside  yesterday and was told to f/u with PCP.  Notified pt that first available appt with any provider at [facility] is 8/31/17 and she already has an appt scheduled that day with Mr. Sampson.  Notified pt that the earliest available appt between [facility] and [facility] is 8/30/17.  Advised pt that [facility] has multiple UC locations where she doesn't need an appt.  Pt verbalized understanding.

## 2017-08-25 NOTE — TELEPHONE ENCOUNTER
----- Message from Nahomy Burrell sent at 8/25/2017 10:07 AM CDT -----  Returning nurse call. Please call back at [phone]. thanks

## 2017-08-26 ENCOUNTER — HOSPITAL ENCOUNTER (EMERGENCY)
Facility: HOSPITAL | Age: 47
Discharge: HOME | End: 2017-08-26
Attending: EMERGENCY MEDICINE
Payer: MEDICAID

## 2017-08-26 VITALS
HEART RATE: 79 BPM | DIASTOLIC BLOOD PRESSURE: 79 MMHG | WEIGHT: 230 LBS | HEIGHT: 56 IN | OXYGEN SATURATION: 98 % | SYSTOLIC BLOOD PRESSURE: 154 MMHG | TEMPERATURE: 99 F | BODY MASS INDEX: 51.74 KG/M2 | RESPIRATION RATE: 19 BRPM

## 2017-08-26 DIAGNOSIS — R11.10 VOMITING, INTRACTABILITY OF VOMITING NOT SPECIFIED, PRESENCE OF NAUSEA NOT SPECIFIED, UNSPECIFIED VOMITING TYPE: Primary | ICD-10-CM

## 2017-08-26 DIAGNOSIS — R19.7 DIARRHEA, UNSPECIFIED TYPE: ICD-10-CM

## 2017-08-26 LAB
ALBUMIN SERPL BCP-MCNC: 3.4 G/DL
ALP SERPL-CCNC: 107 U/L
ALT SERPL W/O P-5'-P-CCNC: 76 U/L
ANION GAP SERPL CALC-SCNC: 14 MMOL/L
AST SERPL-CCNC: 73 U/L
BASOPHILS # BLD AUTO: 0.04 K/UL
BASOPHILS NFR BLD: 0.4 %
BILIRUB SERPL-MCNC: 0.2 MG/DL
BILIRUB UR QL STRIP: NEGATIVE
BUN SERPL-MCNC: 8 MG/DL
CALCIUM SERPL-MCNC: 10.1 MG/DL
CHLORIDE SERPL-SCNC: 98 MMOL/L
CLARITY UR: CLEAR
CO2 SERPL-SCNC: 25 MMOL/L
COLOR UR: YELLOW
CREAT SERPL-MCNC: 0.8 MG/DL
DIFFERENTIAL METHOD: ABNORMAL
EOSINOPHIL # BLD AUTO: 0.1 K/UL
EOSINOPHIL NFR BLD: 1.4 %
ERYTHROCYTE [DISTWIDTH] IN BLOOD BY AUTOMATED COUNT: 17.1 %
EST. GFR  (AFRICAN AMERICAN): >60 ML/MIN/1.73 M^2
EST. GFR  (NON AFRICAN AMERICAN): >60 ML/MIN/1.73 M^2
GLUCOSE SERPL-MCNC: 194 MG/DL
GLUCOSE UR QL STRIP: NEGATIVE
HCT VFR BLD AUTO: 37.3 %
HGB BLD-MCNC: 11.9 G/DL
HGB UR QL STRIP: ABNORMAL
KETONES UR QL STRIP: NEGATIVE
LEUKOCYTE ESTERASE UR QL STRIP: NEGATIVE
LIPASE SERPL-CCNC: 14 U/L
LYMPHOCYTES # BLD AUTO: 3 K/UL
LYMPHOCYTES NFR BLD: 29.4 %
MCH RBC QN AUTO: 26 PG
MCHC RBC AUTO-ENTMCNC: 31.9 G/DL
MCV RBC AUTO: 82 FL
MONOCYTES # BLD AUTO: 0.5 K/UL
MONOCYTES NFR BLD: 4.5 %
NEUTROPHILS # BLD AUTO: 6.5 K/UL
NEUTROPHILS NFR BLD: 64.3 %
NITRITE UR QL STRIP: NEGATIVE
PH UR STRIP: 6 [PH] (ref 5–8)
PLATELET # BLD AUTO: 265 K/UL
PMV BLD AUTO: 9.3 FL
POCT GLUCOSE: 208 MG/DL (ref 70–110)
POTASSIUM SERPL-SCNC: 4.3 MMOL/L
PROT SERPL-MCNC: 7.4 G/DL
PROT UR QL STRIP: NEGATIVE
RBC # BLD AUTO: 4.57 M/UL
SODIUM SERPL-SCNC: 137 MMOL/L
SP GR UR STRIP: >=1.03 (ref 1–1.03)
URN SPEC COLLECT METH UR: ABNORMAL
UROBILINOGEN UR STRIP-ACNC: NEGATIVE EU/DL
WBC # BLD AUTO: 10.15 K/UL

## 2017-08-26 PROCEDURE — 25000003 PHARM REV CODE 250: Performed by: EMERGENCY MEDICINE

## 2017-08-26 PROCEDURE — 82962 GLUCOSE BLOOD TEST: CPT

## 2017-08-26 PROCEDURE — 80053 COMPREHEN METABOLIC PANEL: CPT

## 2017-08-26 PROCEDURE — 96374 THER/PROPH/DIAG INJ IV PUSH: CPT

## 2017-08-26 PROCEDURE — 63600175 PHARM REV CODE 636 W HCPCS: Performed by: EMERGENCY MEDICINE

## 2017-08-26 PROCEDURE — 83690 ASSAY OF LIPASE: CPT

## 2017-08-26 PROCEDURE — 99284 EMERGENCY DEPT VISIT MOD MDM: CPT | Mod: 25

## 2017-08-26 PROCEDURE — 81003 URINALYSIS AUTO W/O SCOPE: CPT

## 2017-08-26 PROCEDURE — 96361 HYDRATE IV INFUSION ADD-ON: CPT

## 2017-08-26 PROCEDURE — 96375 TX/PRO/DX INJ NEW DRUG ADDON: CPT

## 2017-08-26 PROCEDURE — 85025 COMPLETE CBC W/AUTO DIFF WBC: CPT

## 2017-08-26 RX ORDER — METOCLOPRAMIDE HYDROCHLORIDE 5 MG/ML
10 INJECTION INTRAMUSCULAR; INTRAVENOUS
Status: COMPLETED | OUTPATIENT
Start: 2017-08-26 | End: 2017-08-26

## 2017-08-26 RX ORDER — DICYCLOMINE HYDROCHLORIDE 20 MG/1
20 TABLET ORAL
Status: COMPLETED | OUTPATIENT
Start: 2017-08-26 | End: 2017-08-26

## 2017-08-26 RX ORDER — SODIUM CHLORIDE 9 MG/ML
1000 INJECTION, SOLUTION INTRAVENOUS ONCE
Status: COMPLETED | OUTPATIENT
Start: 2017-08-26 | End: 2017-08-26

## 2017-08-26 RX ORDER — DICYCLOMINE HYDROCHLORIDE 20 MG/1
20 TABLET ORAL 2 TIMES DAILY
Qty: 20 TABLET | Refills: 0 | Status: SHIPPED | OUTPATIENT
Start: 2017-08-26 | End: 2017-09-25

## 2017-08-26 RX ORDER — ONDANSETRON 4 MG/1
4 TABLET, FILM COATED ORAL EVERY 6 HOURS
Qty: 12 TABLET | Refills: 0 | Status: SHIPPED | OUTPATIENT
Start: 2017-08-26 | End: 2017-08-31 | Stop reason: SDUPTHER

## 2017-08-26 RX ORDER — ONDANSETRON 2 MG/ML
4 INJECTION INTRAMUSCULAR; INTRAVENOUS
Status: COMPLETED | OUTPATIENT
Start: 2017-08-26 | End: 2017-08-26

## 2017-08-26 RX ADMIN — ONDANSETRON 4 MG: 2 INJECTION INTRAMUSCULAR; INTRAVENOUS at 03:08

## 2017-08-26 RX ADMIN — METOCLOPRAMIDE 10 MG: 5 INJECTION, SOLUTION INTRAMUSCULAR; INTRAVENOUS at 04:08

## 2017-08-26 RX ADMIN — DICYCLOMINE HYDROCHLORIDE 20 MG: 20 TABLET ORAL at 03:08

## 2017-08-26 RX ADMIN — SODIUM CHLORIDE 1000 ML: 0.9 INJECTION, SOLUTION INTRAVENOUS at 03:08

## 2017-08-26 NOTE — ED NOTES
Warm blanket provided for patient.  Patient denies any further needs.  IV fluids infusing as ordered. Bed is in low, locked, position with side rails up x 2 and call light within reach.  Awaiting results and disposition.

## 2017-08-26 NOTE — ED PROVIDER NOTES
SCRIBE #1 NOTE: I, Gina Stewardble, am scribing for, and in the presence of, Binh Reveles MD. I have scribed the entire note.      History      Chief Complaint   Patient presents with    Diarrhea     x 3 days.     Urinary Frequency     pt glucose 208 in triage states she urinates alot when her sugar is  high.        Review of patient's allergies indicates:   Allergen Reactions    Lortab [hydrocodone-acetaminophen] Itching    Neuromuscular blockers, steroidal      some    Morphine Rash     itching    Seconal [secobarbital sodium] Rash     itching    Tylox [oxycodone-acetaminophen] Rash        HPI   HPI    2017, 3:09 AM   History obtained from the patient      History of Present Illness: Yolanda Betts is a 47 y.o. female patient with hx HTN who presents to the Emergency Department for abdominal pain with nausea and diarrhea onset 3 days ago. Pt has had 6 episodes of diarrhea per day since onset of sxs. Sxs are intermittent and moderate in severity. There are no mitigating or exacerbating factors noted. Pt denies any fever, chills, dysuria, hematuria, vomiting, and all other sxs at this time. No prior tx reported. No further complaints or concerns at this time.           Arrival mode: Personal vehicle     PCP: Vish Stafford MD       Past Medical History:  Past Medical History:   Diagnosis Date    Anemia     Anxiety     Asthma 10/11/2016    Bipolar 1 disorder     GERD (gastroesophageal reflux disease)     Hyperlipidemia        Past Surgical History:  Past Surgical History:   Procedure Laterality Date    BELT ABDOMINOPLASTY      BREAST SURGERY Bilateral     Reduction     SECTION      X 2    COLONOSCOPY      HYSTERECTOMY      MOUTH SURGERY  1996    TUBAL LIGATION           Family History:  Family History   Problem Relation Age of Onset    Diabetes Mother     Hyperlipidemia Mother     Hypertension Mother     Asthma Mother     COPD Mother     Glaucoma Mother      Thyroid disease Mother     Hypertension Father     Hyperlipidemia Father     Cancer Father      Brain, lung, liver, kidney    Heart disease Maternal Grandmother     Hyperlipidemia Maternal Grandmother     Hypertension Maternal Grandmother     Cataracts Maternal Grandmother     Diabetes Maternal Grandmother     Heart disease Maternal Grandfather     Hyperlipidemia Maternal Grandfather     Hypertension Maternal Grandfather     Glaucoma Maternal Grandfather     Cancer Maternal Grandfather     Cataracts Maternal Grandfather     Macular degeneration Maternal Grandfather     Diabetes Maternal Grandfather     Heart disease Paternal Grandmother     Hyperlipidemia Paternal Grandmother     Hypertension Paternal Grandmother     Cataracts Paternal Grandmother     Heart disease Paternal Grandfather     Hyperlipidemia Paternal Grandfather     Hypertension Paternal Grandfather     Cataracts Paternal Grandfather        Social History:  Social History     Social History Main Topics    Smoking status: Never Smoker    Smokeless tobacco: Never Used    Alcohol use 0.0 oz/week      Comment: socially    Drug use: No    Sexual activity: Unknown       ROS   Review of Systems   Constitutional: Negative for chills and fever.   HENT: Negative for congestion and sore throat.    Respiratory: Negative for shortness of breath.    Cardiovascular: Negative for chest pain.   Gastrointestinal: Positive for abdominal pain, diarrhea and nausea. Negative for vomiting.   Genitourinary: Negative for dysuria and hematuria.   Musculoskeletal: Negative for back pain.   Skin: Negative for rash.   Neurological: Negative for weakness and light-headedness.   Hematological: Does not bruise/bleed easily.   All other systems reviewed and are negative.    Physical Exam      Initial Vitals [08/26/17 0229]   BP Pulse Resp Temp SpO2   (!) 171/86 (!) 116 19 98.4 °F (36.9 °C) 97 %      MAP       114.33          Physical Exam  Nursing Notes and  "Vital Signs Reviewed.  Constitutional: Patient is in no acute distress. Well-developed and well-nourished.  Head: Atraumatic. Normocephalic.  Eyes: PERRL. EOM intact. Conjunctivae are not pale. No scleral icterus.  ENT: Mucous membranes are moist. Oropharynx is clear and symmetric.    Neck: Supple. Full ROM. No lymphadenopathy.  Cardiovascular: Regular rate. Regular rhythm. No murmurs, rubs, or gallops. Distal pulses are 2+ and symmetric.  Pulmonary/Chest: No respiratory distress. Clear to auscultation bilaterally. No wheezing, rales, or rhonchi.  Abdominal: Soft and non-distended.  There is no tenderness.  No rebound, guarding, or rigidity. Good bowel sounds.  Musculoskeletal: Moves all extremities. No obvious deformities. No edema. No calf tenderness.  Skin: Warm and dry.  Neurological:  Alert, awake, and appropriate.  Normal speech.  Grossly neurologically intact.  Psychiatric: Normal affect. Good eye contact. Appropriate in content.    ED Course    Procedures  ED Vital Signs:  Vitals:    08/26/17 0229   BP: (!) 171/86   Pulse: (!) 116   Resp: 19   Temp: 98.4 °F (36.9 °C)   TempSrc: Oral   SpO2: 97%   Weight: 104.3 kg (230 lb)   Height: 4' 8" (1.422 m)       Abnormal Lab Results:  Labs Reviewed   URINALYSIS - Abnormal; Notable for the following:        Result Value    Specific Gravity, UA >=1.030 (*)     Occult Blood UA Trace (*)     All other components within normal limits   CBC W/ AUTO DIFFERENTIAL - Abnormal; Notable for the following:     Hemoglobin 11.9 (*)     MCH 26.0 (*)     MCHC 31.9 (*)     RDW 17.1 (*)     All other components within normal limits   COMPREHENSIVE METABOLIC PANEL - Abnormal; Notable for the following:     Glucose 194 (*)     Albumin 3.4 (*)     AST 73 (*)     ALT 76 (*)     All other components within normal limits   POCT GLUCOSE - Abnormal; Notable for the following:     POCT Glucose 208 (*)     All other components within normal limits   LIPASE        All Lab Results:  Results for " orders placed or performed during the hospital encounter of 08/26/17   Urinalysis   Result Value Ref Range    Specimen UA Urine, Clean Catch     Color, UA Yellow Yellow, Straw, Milagros    Appearance, UA Clear Clear    pH, UA 6.0 5.0 - 8.0    Specific Gravity, UA >=1.030 (A) 1.005 - 1.030    Protein, UA Negative Negative    Glucose, UA Negative Negative    Ketones, UA Negative Negative    Bilirubin (UA) Negative Negative    Occult Blood UA Trace (A) Negative    Nitrite, UA Negative Negative    Urobilinogen, UA Negative <2.0 EU/dL    Leukocytes, UA Negative Negative   CBC auto differential   Result Value Ref Range    WBC 10.15 3.90 - 12.70 K/uL    RBC 4.57 4.00 - 5.40 M/uL    Hemoglobin 11.9 (L) 12.0 - 16.0 g/dL    Hematocrit 37.3 37.0 - 48.5 %    MCV 82 82 - 98 fL    MCH 26.0 (L) 27.0 - 31.0 pg    MCHC 31.9 (L) 32.0 - 36.0 g/dL    RDW 17.1 (H) 11.5 - 14.5 %    Platelets 265 150 - 350 K/uL    MPV 9.3 9.2 - 12.9 fL    Gran # 6.5 1.8 - 7.7 K/uL    Lymph # 3.0 1.0 - 4.8 K/uL    Mono # 0.5 0.3 - 1.0 K/uL    Eos # 0.1 0.0 - 0.5 K/uL    Baso # 0.04 0.00 - 0.20 K/uL    Gran% 64.3 38.0 - 73.0 %    Lymph% 29.4 18.0 - 48.0 %    Mono% 4.5 4.0 - 15.0 %    Eosinophil% 1.4 0.0 - 8.0 %    Basophil% 0.4 0.0 - 1.9 %    Differential Method Automated    Comprehensive metabolic panel   Result Value Ref Range    Sodium 137 136 - 145 mmol/L    Potassium 4.3 3.5 - 5.1 mmol/L    Chloride 98 95 - 110 mmol/L    CO2 25 23 - 29 mmol/L    Glucose 194 (H) 70 - 110 mg/dL    BUN, Bld 8 6 - 20 mg/dL    Creatinine 0.8 0.5 - 1.4 mg/dL    Calcium 10.1 8.7 - 10.5 mg/dL    Total Protein 7.4 6.0 - 8.4 g/dL    Albumin 3.4 (L) 3.5 - 5.2 g/dL    Total Bilirubin 0.2 0.1 - 1.0 mg/dL    Alkaline Phosphatase 107 55 - 135 U/L    AST 73 (H) 10 - 40 U/L    ALT 76 (H) 10 - 44 U/L    Anion Gap 14 8 - 16 mmol/L    eGFR if African American >60 >60 mL/min/1.73 m^2    eGFR if non African American >60 >60 mL/min/1.73 m^2   Lipase   Result Value Ref Range    Lipase 14 4 - 60  U/L   POCT glucose   Result Value Ref Range    POCT Glucose 208 (H) 70 - 110 mg/dL         Imaging Results:  None ordered.           The Emergency Provider reviewed the vital signs and test results, which are outlined above.    ED Discussion     4:06 AM: Re-evaluated pt. Pt is resting comfortably and is in no acute distress.  Pt still c/o nausea.  D/w pt all pertinent results. D/w pt any concerns expressed at this time. Answered all questions. Pt expresses understanding at this time.    4:27 AM: Reassessed pt at this time.  Pt states her condition has improved at this time. Discussed with pt all pertinent ED information and results. Discussed pt dx and plan of tx. Gave pt all f/u and return to the ED instructions. All questions and concerns were addressed at this time. Pt expresses understanding of information and instructions, and is comfortable with plan to discharge. Pt is stable for discharge.      ED Medication(s):  Medications   metoclopramide HCl injection 10 mg (not administered)   ondansetron injection 4 mg (4 mg Intravenous Given 8/26/17 0329)   dicyclomine tablet 20 mg (20 mg Oral Given 8/26/17 0320)   0.9%  NaCl infusion (1,000 mLs Intravenous New Bag 8/26/17 0331)       New Prescriptions    DICYCLOMINE (BENTYL) 20 MG TABLET    Take 1 tablet (20 mg total) by mouth 2 (two) times daily.    ONDANSETRON (ZOFRAN) 4 MG TABLET    Take 1 tablet (4 mg total) by mouth every 6 (six) hours.       Follow-up Information     Vish Stafford MD In 1 day.    Specialty:  Internal Medicine  Why:  Patient should return to the ED for any concerns or worsening of condition.  Contact information:  [address]  [place] [id number] [phone]                     Medical Decision Making    Medical Decision Making:   Clinical Tests:   Lab Tests: Ordered and Reviewed           Scribe Attestation:   Scribe #1: I performed the above scribed service and the documentation accurately describes the services I performed. I attest  to the accuracy of the note.    Attending:   Physician Attestation Statement for Scribe #1: I, Binh Reveles MD, personally performed the services described in this documentation, as scribed by Gina Montero, in my presence, and it is both accurate and complete.          Clinical Impression       ICD-10-CM ICD-9-CM   1. Vomiting, intractability of vomiting not specified, presence of nausea not specified, unspecified vomiting type R11.10 787.03   2. Diarrhea, unspecified type R19.7 787.91       Disposition:   Disposition: Discharged  Condition: Stable         Binh Reveles MD  08/26/17 0536

## 2017-08-29 ENCOUNTER — OFFICE VISIT (OUTPATIENT)
Dept: URGENT CARE | Facility: CLINIC | Age: 47
End: 2017-08-29
Payer: MEDICAID

## 2017-08-29 ENCOUNTER — LAB VISIT (OUTPATIENT)
Dept: LAB | Facility: HOSPITAL | Age: 47
End: 2017-08-29
Attending: NURSE PRACTITIONER
Payer: MEDICAID

## 2017-08-29 VITALS
SYSTOLIC BLOOD PRESSURE: 144 MMHG | HEART RATE: 98 BPM | BODY MASS INDEX: 50.23 KG/M2 | HEIGHT: 56 IN | WEIGHT: 223.31 LBS | TEMPERATURE: 98 F | DIASTOLIC BLOOD PRESSURE: 80 MMHG | OXYGEN SATURATION: 98 %

## 2017-08-29 DIAGNOSIS — R30.0 DYSURIA: ICD-10-CM

## 2017-08-29 DIAGNOSIS — R42 DIZZINESS: ICD-10-CM

## 2017-08-29 DIAGNOSIS — B37.31 VAGINAL CANDIDIASIS: ICD-10-CM

## 2017-08-29 DIAGNOSIS — R42 DIZZINESS: Primary | ICD-10-CM

## 2017-08-29 DIAGNOSIS — I10 ESSENTIAL HYPERTENSION: Chronic | ICD-10-CM

## 2017-08-29 LAB
BILIRUB SERPL-MCNC: ABNORMAL MG/DL
BLOOD URINE, POC: ABNORMAL
COLOR, POC UA: YELLOW
GLUCOSE UR QL STRIP: ABNORMAL
KETONES UR QL STRIP: ABNORMAL
LEUKOCYTE ESTERASE URINE, POC: ABNORMAL
NITRITE, POC UA: ABNORMAL
PH, POC UA: 5
PROTEIN, POC: ABNORMAL
SPECIFIC GRAVITY, POC UA: 1.02
UROBILINOGEN, POC UA: ABNORMAL

## 2017-08-29 PROCEDURE — 99215 OFFICE O/P EST HI 40 MIN: CPT | Mod: PBBFAC,PO,25 | Performed by: NURSE PRACTITIONER

## 2017-08-29 PROCEDURE — 3077F SYST BP >= 140 MM HG: CPT | Mod: ,,, | Performed by: NURSE PRACTITIONER

## 2017-08-29 PROCEDURE — 3008F BODY MASS INDEX DOCD: CPT | Mod: ,,, | Performed by: NURSE PRACTITIONER

## 2017-08-29 PROCEDURE — 81001 URINALYSIS AUTO W/SCOPE: CPT | Mod: PBBFAC,PO | Performed by: NURSE PRACTITIONER

## 2017-08-29 PROCEDURE — 84443 ASSAY THYROID STIM HORMONE: CPT

## 2017-08-29 PROCEDURE — 99999 PR PBB SHADOW E&M-EST. PATIENT-LVL V: CPT | Mod: PBBFAC,,, | Performed by: NURSE PRACTITIONER

## 2017-08-29 PROCEDURE — 93005 ELECTROCARDIOGRAM TRACING: CPT | Mod: PBBFAC,PO | Performed by: INTERNAL MEDICINE

## 2017-08-29 PROCEDURE — 87147 CULTURE TYPE IMMUNOLOGIC: CPT

## 2017-08-29 PROCEDURE — 36415 COLL VENOUS BLD VENIPUNCTURE: CPT | Mod: PO

## 2017-08-29 PROCEDURE — 93010 ELECTROCARDIOGRAM REPORT: CPT | Mod: S$PBB,,, | Performed by: INTERNAL MEDICINE

## 2017-08-29 PROCEDURE — 87088 URINE BACTERIA CULTURE: CPT

## 2017-08-29 PROCEDURE — 3079F DIAST BP 80-89 MM HG: CPT | Mod: ,,, | Performed by: NURSE PRACTITIONER

## 2017-08-29 PROCEDURE — 99214 OFFICE O/P EST MOD 30 MIN: CPT | Mod: S$PBB,,, | Performed by: NURSE PRACTITIONER

## 2017-08-29 PROCEDURE — S0119 ONDANSETRON 4 MG: HCPCS | Mod: PBBFAC,PO

## 2017-08-29 PROCEDURE — 87086 URINE CULTURE/COLONY COUNT: CPT

## 2017-08-29 RX ORDER — FLUCONAZOLE 150 MG/1
150 TABLET ORAL ONCE
Qty: 2 TABLET | Refills: 0 | Status: SHIPPED | OUTPATIENT
Start: 2017-08-29 | End: 2017-08-29

## 2017-08-29 RX ORDER — ONDANSETRON 4 MG/1
4 TABLET, ORALLY DISINTEGRATING ORAL EVERY 8 HOURS PRN
Qty: 20 TABLET | Refills: 0 | Status: SHIPPED | OUTPATIENT
Start: 2017-08-29 | End: 2017-12-21

## 2017-08-29 RX ORDER — ONDANSETRON 4 MG/1
8 TABLET, ORALLY DISINTEGRATING ORAL
Status: COMPLETED | OUTPATIENT
Start: 2017-08-29 | End: 2017-08-29

## 2017-08-29 RX ORDER — SULFAMETHOXAZOLE AND TRIMETHOPRIM 800; 160 MG/1; MG/1
1 TABLET ORAL 2 TIMES DAILY
Qty: 14 TABLET | Refills: 0 | Status: SHIPPED | OUTPATIENT
Start: 2017-08-29 | End: 2017-09-05

## 2017-08-29 RX ORDER — ZOLPIDEM TARTRATE 5 MG/1
5 TABLET ORAL NIGHTLY PRN
COMMUNITY
End: 2017-08-31 | Stop reason: DRUGHIGH

## 2017-08-29 RX ADMIN — ONDANSETRON 8 MG: 4 TABLET, ORALLY DISINTEGRATING ORAL at 03:08

## 2017-08-29 NOTE — PROGRESS NOTES
"Subjective:      Patient ID: Yolanda Betts is a 47 y.o. female.    Chief Complaint: Dizziness ("increased b/p, dizziness x's 2 weeks, [facility] the same issues recently")    Ms. Betts presents to Urgent Care today with complaints of elevated BP, dizziness, and nausea. She has been seen for this in the ER and in Urgent Care recently. Was told to follow up with PCP. She had an appointment with Dr. Quintana, but had a different time written down and was late, so she was put on Urgent Care's schedule. She denies any worsening of symptoms since last seen by ER, but has not had any improvement. She's getting home readings of 160-150/100-110. Taking all medications as prescribed and denies any recent medicaiton changes. She also denies increased stress at home, caffeine use, OTC decongestants, changes in diet, or any other identifiable cause for the elevated BPs. Ms. Betts denies chest pain, shortness of breath, palpitations, or leg swelling. Has been having some intermittent diaphoresis and dizziness. Dizziness is Also having an intermittent frontal headache. She has been having UTI symptoms, although, urine in the ER was negative for infection. + vaginal itching -- feels like she has a yeast infection.       Review of Systems   Constitutional: Positive for diaphoresis. Negative for activity change, appetite change and fever.   HENT: Positive for congestion (mild) and ear pain (no pain but having ear popping). Negative for rhinorrhea and sneezing.    Eyes: Negative.  Negative for visual disturbance.   Respiratory: Negative.    Cardiovascular: Negative for chest pain and palpitations.   Gastrointestinal: Negative.    Genitourinary: Positive for dysuria, frequency and vaginal discharge (itching).   Musculoskeletal: Negative.    Skin: Negative.    Neurological: Positive for dizziness and headaches (intermittent; frontal).        Has chronic tingling to extremities due to diabetic " "neuropathy, but not worse than baseline.   Hematological: Negative.    Psychiatric/Behavioral: The patient is not nervous/anxious.        Objective:   BP (!) 144/80 (BP Location: Right arm, Patient Position: Sitting, BP Method: Large (Manual))   Pulse 98   Temp 97.7 °F (36.5 °C) (Tympanic)   Ht 4' 8" (1.422 m)   Wt 101.3 kg (223 lb 5.2 oz)   SpO2 98%   BMI 50.07 kg/m²   Physical Exam   Constitutional: She is oriented to person, place, and time. She appears well-developed and well-nourished. No distress.   HENT:   Head: Normocephalic and atraumatic.   Right Ear: Tympanic membrane normal.   Left Ear: Tympanic membrane normal.   Nose: Nose normal.   Mouth/Throat: Uvula is midline and mucous membranes are normal. Posterior oropharyngeal erythema (mild) present. No posterior oropharyngeal edema.   Neck: Normal range of motion. Neck supple. No thyroid mass and no thyromegaly present.   Cardiovascular: Normal rate, regular rhythm, normal heart sounds and intact distal pulses.    No murmur heard.  Pulmonary/Chest: Effort normal and breath sounds normal. No respiratory distress.   Abdominal: Soft. There is no CVA tenderness.   Lymphadenopathy:     She has no cervical adenopathy.   Neurological: She is alert and oriented to person, place, and time.   Skin: Skin is warm and dry. She is not diaphoretic.   Psychiatric: She has a normal mood and affect. Her speech is normal and behavior is normal.   Nursing note and vitals reviewed.    Assessment:      1. Dizziness    2. Dysuria    3. Vaginal candidiasis    4. Essential hypertension       Plan:   Dizziness  Comments:  EKG is unchanged from previous. Will check TSH. Follow up with primary care in 2 days as scheduled, sooner for new or worsening symptoms.  Orders:  -     ondansetron disintegrating tablet 8 mg; Take 2 tablets (8 mg total) by mouth one time.  -     IN OFFICE EKG 12-LEAD (to [facility])  -     ondansetron (ZOFRAN-ODT) 4 MG TbDL; Take 1 tablet (4 mg total) by mouth " every 8 (eight) hours as needed.  Dispense: 20 tablet; Refill: 0  -     TSH; Future; Expected date: 08/29/2017    Dysuria  Comments:  + WBC and RBCs. Will treat for UTI due to dysuria and frequency. I will call with urine culture results when available.   Orders:  -     POCT urinalysis, dipstick or tablet reag  -     sulfamethoxazole-trimethoprim 800-160mg (BACTRIM DS) 800-160 mg Tab; Take 1 tablet by mouth 2 (two) times daily.  Dispense: 14 tablet; Refill: 0  -     Urine culture    Vaginal candidiasis  Comments:  Diflucan. Follow up with PCP or Gyn if not improving.  Orders:  -     fluconazole (DIFLUCAN) 150 MG Tab; Take 1 tablet (150 mg total) by mouth once. May repeat after 72 hours if symptoms persist  Dispense: 2 tablet; Refill: 0    Essential hypertension  Comments:  Monitor BP. Gave parameters for BP and associated red flag symptoms that warrant immediate ed evaluation.     Keep follow up with primary care in two days.   Instructions, follow up, red flag symptoms that warrant immediate emergency department evaluation, and supportive care as per AVS.

## 2017-08-29 NOTE — PATIENT INSTRUCTIONS
Go to the ER for any BP readings >185/110, severe headache, sudden vision or speech changes, chest pain, palpitations, shortness of breath, or for any worsening or new numbness/weakness of arms, legs, or face.     Dizziness (Uncertain Cause)  Dizziness is a common symptom. It may be described as lightheadedness, spinning, or feeling like you are going to faint. Dizziness can have many causes.  Be sure to tell the healthcare provider about:  · All medicines you take, including prescription, over-the-counter, herbs, and supplements  · Any other symptoms you have  · Any health problems you are being treated for  · Anything that causes the dizziness to get worse or better  Today's exam did not show an exact cause for your dizziness. Other tests may be needed. Follow up with your healthcare provider.  Home care  · Dizziness that occurs with sudden standing may be a sign of mild dehydration. Drink extra fluids for the next few days.  · If you recently started a new medicine, stopped a medicine, or had the dose of a current medicine changed, talk with the prescribing healthcare provider. Your medicine plan may need adjustment.  · If dizziness lasts more than a few seconds, sit or lie down until it passes. This may help prevent injury in case you pass out.  · Do not drive or use power tools or dangerous equipment until you have had no dizziness for at least 48 hours.  Follow-up care  Follow up with your healthcare provider for further evaluation within the next 7 days or as advised.  When to seek medical advice  Call your healthcare provider for any of the following:  · Worsening of symptoms or new symptoms  · Passing out or seizure  · Repeated vomiting  · Headache  · Palpitations (the sense that your heart is fluttering or beating fast or hard)  · Shortness of breath  · Blood in vomit or stool (black or red color)  · Weakness of an arm or leg or one side of the face  · Vision or hearing changes  · Trouble walking or  speaking  · Chest, arm, neck, back, or jaw pain  Date Last Reviewed: 8/23/2015  © 2659-5279 [employer]. [address] [place] [address], [place] [address]. All rights reserved. This information is not intended as a substitute for professional medical care. Always follow your healthcare professional's instructions.        Urinary Tract Infections in Women    Urinary tract infections (UTIs) are most often caused by bacteria (germs). These bacteria enter the urinary tract. The bacteria may come from outside the body. Or they may travel from the skin outside the rectum or vagina into the urethra. Female anatomy makes it easier for bacteria from the bowel to enter a womans urinary tract, which is the most common source of UTI. This means women develop UTIs more often than men. Pain in or around the urinary tract is a common UTI symptom. But the only way to know for sure if you have a UTI for the health care provider to test your urine. The two tests that may be done are the urinalysis and urine culture.  Types of UTIs  · Cystitis: A bladder infection (cystitis) is the most common UTI in women. You may have urgent or frequent urination. You may also have pain, burning when you urinate, and bloody urine.  · Urethritis: This is an inflamed urethra, which is the tube that carries urine from the bladder to outside the body. You may have lower stomach or back pain. You may also have urgent or frequent urination.  · Pyelonephritis: This is a kidney infection. If not treated, it can be serious and damage your kidneys. In severe cases, you may be hospitalized. You may have a fever and lower back pain.  Medications to treat a UTI  Most UTIs are treated with antibiotics. These kill the bacteria. The length of time you need to take them depends on the type of infection. It may be as short as 3 days. If you have repeated UTIs, a low-dose antibiotic may be needed for several months. Take antibiotics exactly as directed. Dont  stop taking them until all of the medication is gone. If you stop taking the antibiotic too soon, the infection may not go away, and you may develop a resistance to the antibiotic. This can make it much harder to treat.  Lifestyle changes to treat and prevent UTIs  The lifestyle changes below will help get rid of your UTI. They may also help prevent future UTIs.  · Drink plenty of fluids. This includes water, juice, or other caffeine-free drinks. Fluids help flush bacteria out of your body.  · Empty your bladder. Always empty your bladder when you feel the urge to urinate. And always urinate before going to sleep. Urine that stays in your bladder can lead to infection. Try to urinate before and after sex as well.  · Practice good personal hygiene. Wipe yourself from front to back after using the toilet. This helps keep bacteria from getting into the urethra.  · Use condoms during sex. These help prevent UTIs caused by sexually transmitted bacteria. Also, avoid using spermicides during sex. These can increase the risk of UTIs. Choose other forms of birth control instead. For women who tend to get UTIs after sex, a low-dose of a preventive antibiotic may be used. Be sure to discuss this option with your health care provider.  · Follow up with your health care provider as directed. He or she may test to make sure the infection has cleared. If necessary, additional treatment may be started.  Date Last Reviewed: 9/8/2014  © 1276-4627 [employer]. [address] [place] [address], [place] [address]. All rights reserved. This information is not intended as a substitute for professional medical care. Always follow your healthcare professional's instructions.        Controlling High Blood Pressure  High blood pressure (hypertension) is often called the silent killer. This is because many people who have it dont know it. High blood pressure is defined as 140/90 mm Hg or higher. Know your blood pressure and remember to  check it regularly. Doing so can save your life. Here are some things you can do to help control your blood pressure.    Choose heart-healthy foods  · Select low-salt, low-fat foods. Limit sodium intake to 2,400 mg per day or the amount suggested by your healthcare provider.  · Limit canned, dried, cured, packaged, and fast foods. These can contain a lot of salt.  · Eat 8 to 10 servings of fruits and vegetables every day.  · Choose lean meats, fish, or chicken.  · Eat whole-grain pasta, brown rice, and beans.  · Eat 2 to 3 servings of low-fat or fat-free dairy products.  · Ask your doctor about the DASH eating plan. This plan helps reduce blood pressure.  · When you go to a restaurant, ask that your meal be prepared with no added salt.  Maintain a healthy weight  · Ask your healthcare provider how many calories to eat a day. Then stick to that number.  · Ask your healthcare provider what weight range is healthiest for you. If you are overweight, a weight loss of only 3% to 5% of your body weight can help lower blood pressure. Generally, a good weight loss goal is to lose 10% of your body weight in a year.  · Limit snacks and sweets.  · Get regular exercise.  Get up and get active  · Choose activities you enjoy. Find ones you can do with friends or family. This includes bicycling, dancing, walking, and jogging.  · Park farther away from building entrances.  · Use stairs instead of the elevator.  · When you can, walk or bike instead of driving.  · [place] leaves, garden, or do household repairs.  · Be active at a moderate to vigorous level of physical activity for at least 40 minutes for a minimum of 3 to 4 days a week.   Manage stress  · Make time to relax and enjoy life. Find time to laugh.  · Communicate your concerns with your loved ones and your healthcare provider.  · Visit with family and friends, and keep up with hobbies.  Limit alcohol and quit smoking  · Men should have no more than 2 drinks per day.  · Women  should have no more than 1 drink per day.  · Talk with your healthcare provider about quitting smoking. Smoking significantly increases your risk for heart disease and stroke. Ask your healthcare provider about community smoking cessation programs and other options.  Medicines  If lifestyle changes arent enough, your healthcare provider may prescribe high blood pressure medicine. Take all medicines as prescribed. If you have any questions about your medicines, ask your healthcare provider before stopping or changing them.   Date Last Reviewed: 4/27/2016 © 2000-2016 [employer]. [address] [place] [address], [place] [address]. All rights reserved. This information is not intended as a substitute for professional medical care. Always follow your healthcare professional's instructions.

## 2017-08-30 LAB
BACTERIA UR CULT: NORMAL
TSH SERPL DL<=0.005 MIU/L-ACNC: 3.2 UIU/ML

## 2017-08-31 ENCOUNTER — OFFICE VISIT (OUTPATIENT)
Dept: INTERNAL MEDICINE | Facility: CLINIC | Age: 47
End: 2017-08-31
Payer: MEDICAID

## 2017-08-31 VITALS
WEIGHT: 223.31 LBS | SYSTOLIC BLOOD PRESSURE: 122 MMHG | BODY MASS INDEX: 50.23 KG/M2 | OXYGEN SATURATION: 96 % | TEMPERATURE: 98 F | DIASTOLIC BLOOD PRESSURE: 82 MMHG | HEIGHT: 56 IN | HEART RATE: 105 BPM | RESPIRATION RATE: 18 BRPM

## 2017-08-31 DIAGNOSIS — K52.9 CHRONIC DIARRHEA: Primary | ICD-10-CM

## 2017-08-31 PROCEDURE — 3008F BODY MASS INDEX DOCD: CPT | Mod: ,,, | Performed by: PHYSICIAN ASSISTANT

## 2017-08-31 PROCEDURE — 99215 OFFICE O/P EST HI 40 MIN: CPT | Mod: PBBFAC,PO | Performed by: PHYSICIAN ASSISTANT

## 2017-08-31 PROCEDURE — 3074F SYST BP LT 130 MM HG: CPT | Mod: ,,, | Performed by: PHYSICIAN ASSISTANT

## 2017-08-31 PROCEDURE — 99999 PR PBB SHADOW E&M-EST. PATIENT-LVL V: CPT | Mod: PBBFAC,,, | Performed by: PHYSICIAN ASSISTANT

## 2017-08-31 PROCEDURE — 3079F DIAST BP 80-89 MM HG: CPT | Mod: ,,, | Performed by: PHYSICIAN ASSISTANT

## 2017-08-31 PROCEDURE — 99213 OFFICE O/P EST LOW 20 MIN: CPT | Mod: S$PBB,,, | Performed by: PHYSICIAN ASSISTANT

## 2017-08-31 RX ORDER — MIRTAZAPINE 30 MG/1
30 TABLET, FILM COATED ORAL NIGHTLY
Refills: 2 | COMMUNITY
Start: 2017-08-24 | End: 2019-05-02 | Stop reason: DRUGHIGH

## 2017-08-31 RX ORDER — DULOXETIN HYDROCHLORIDE 60 MG/1
60 CAPSULE, DELAYED RELEASE ORAL 2 TIMES DAILY
Refills: 2 | COMMUNITY
Start: 2017-08-24 | End: 2019-12-09

## 2017-08-31 RX ORDER — ALBUTEROL SULFATE 90 UG/1
2 AEROSOL, METERED RESPIRATORY (INHALATION) EVERY 4 HOURS PRN
COMMUNITY
Start: 2017-06-15 | End: 2019-05-02 | Stop reason: SDUPTHER

## 2017-08-31 RX ORDER — DIPHENOXYLATE HYDROCHLORIDE AND ATROPINE SULFATE 2.5; .025 MG/1; MG/1
TABLET ORAL
Qty: 12 TABLET | Refills: 1 | Status: SHIPPED | OUTPATIENT
Start: 2017-08-31 | End: 2017-12-21

## 2017-08-31 RX ORDER — ZOLPIDEM TARTRATE 10 MG/1
10 TABLET ORAL NIGHTLY PRN
COMMUNITY
End: 2019-12-09

## 2017-08-31 RX ORDER — CIPROFLOXACIN 500 MG/1
500 TABLET ORAL 2 TIMES DAILY
Qty: 10 TABLET | Refills: 0 | Status: SHIPPED | OUTPATIENT
Start: 2017-08-31 | End: 2017-09-05

## 2017-08-31 RX ORDER — ALPRAZOLAM 1 MG/1
1 TABLET ORAL 3 TIMES DAILY PRN
COMMUNITY
End: 2019-08-12 | Stop reason: DRUGHIGH

## 2017-08-31 RX ORDER — ARIPIPRAZOLE 30 MG/1
30 TABLET ORAL NIGHTLY
Refills: 0 | COMMUNITY
Start: 2017-08-18 | End: 2019-04-25

## 2017-08-31 RX ORDER — FLUTICASONE PROPIONATE AND SALMETEROL 250; 50 UG/1; UG/1
1 POWDER RESPIRATORY (INHALATION) 2 TIMES DAILY
COMMUNITY
Start: 2017-06-15 | End: 2017-11-08 | Stop reason: SDUPTHER

## 2017-08-31 NOTE — PATIENT INSTRUCTIONS
Noninfectious Gastroenteritis (Ages 6 Years to Adult)    Gastroenteritis can cause nausea, vomiting, diarrhea, and abdominal cramping. This may occur as a result of food sensitivity, inflammation of your gastrointestinal tract, medicines, stress, or other causes not related to infection. Your symptoms will usually last from 1 to 3 days, but can last longer. Antibiotics are not effective, but simple home treatment will be helpful.  Home care  Medicine  · You may use acetaminophen or NSAID medicines like ibuprofen or naproxen to control fever, unless another medicine is prescribed. (Note: If you have chronic liver or kidney disease, or ever had a stomach ulcer or gastrointestinalI bleeding, talk with your healthcare provider before using these medicines.) Aspirin should never be used in anyone under 18 years of age who is ill with a fever. It may cause severe liver damage. Don't increase your NSAID medicines if you are already taking these medicines for another condition (like arthritis). Don't use NSAIDS if you are on aspirin (such as for heart disease, or after a stroke).  · If medicines for diarrhea or vomiting are prescribed, take only as directed.  General care and preventing spread of the illness  · If symptoms are severe, rest at home for the next 24 hours or until you feel better.  · Hand washing with soap and water is the best way to prevent the spread of infection. Wash your hands after touching anyone who is sick.  · Wash your hands after using the toilet and before meals. Clean the toilet after each use.  · Caffeine, tobacco, and alcohol can make your diarrhea, cramping, and pain worse.  Diet  · Water and clear liquids are important so you do not get dehydrated. Drink a small amount at a time.  · Do not force yourself to eat, especially if you have cramps, vomiting, or diarrhea. When you finally decide to start eating, do not eat large amounts at a time, even if you are hungry.  · If you eat, avoid  fatty, greasy, spicy, or fried foods.  · Do not eat dairy products if you have diarrhea; they can make the diarrhea worse.  During the first 24 hours (the first full day), follow the diet below:  · Beverages: Water, clear liquids, soft drinks without caffeine, like ginger ale; mineral water (plain or flavored); decaffeinated tea and coffee.  · Soups: Clear broth, consommé, and bouillon Sports drinks aren't a good choice because they have too much sugar and not enough electrolytes. In this case, commercially available products called oral rehydration solutions are best.  · Desserts: Plain gelatin, popsicles, and fruit juice bars.  During the next 24 hours (the second day), you may add the following to the above if you have improved. If not, continue what you did the first day:  · Hot cereal, plain toast, bread, rolls, crackers  · Plain noodles, rice, mashed potatoes, chicken noodle or rice soup  · Unsweetened canned fruit (avoid pineapple), bananas  · Limit caffeine and chocolate. No spices or seasonings except salt.  During the next 24 hours  · Gradually resume a normal diet, as you feel better and your symptoms improve.  · If at any time your symptoms start getting worse, go back to clear liquids until you feel better.  Food preparation  · If you have diarrhea, you should not prepare food for others. When you  prepare food for yourself, wash your hands before and after.  · Wash your hands after using cutting boards, countertops, and knives that have been in contact with raw food.  · Keep uncooked meats away from cooked and ready-to-eat foods.  Follow-up care  Follow up with your healthcare provider if you are not improving over the next 2 to 3 days, or as advised. If a stool (diarrhea) sample was taken, call for the results as directed.  When to seek medical care  Call your healthcare provider right away if any of these occur:   · Increasing abdominal pain or constant lower right abdominal pain  · Continued  vomiting (unable to keep liquids down)  · Frequent diarrhea (more than 5 times a day)  · Blood in vomit or stool (black or red color)  · Inability to tolerate solid food after a few days.  · Dark urine, reduced urine output  · Weakness, dizziness  · Drowsiness  · Fever of 100.4ºF (38.0ºC) or higher, or as directed by your healthcare provider  · New rash  Call 911  Call 911 if any of these occur:  · Trouble breathing  · Chest pain  · Confusion  · Severe drowsiness or trouble awakening  · Seizure  · Stiff neck  Date Last Reviewed: 11/16/2015  © 7247-0693 [employer]. [address] [place] [address], [place] [address]. All rights reserved. This information is not intended as a substitute for professional medical care. Always follow your healthcare professional's instructions.        Uncertain Causes of Diarrhea (Adult)    Diarrhea is when stools are loose and watery. This can be caused by:  · Viral infections  · Bacterial infections  · Food poisoning  · Parasites  · Irritable bowel syndrome (IBS)  · Inflammatory bowel diseases such as ulcerative colitis, Crohn's disease, and celiac disease  · Food intolerance, such as to lactose, the sugar found in milk and milk products  · Reaction to medicines like antibiotics, laxatives, cancer drugs, and antacids  Along with diarrhea, you may also have:  · Abdominal pain and cramping  · Nausea and vomiting  · Loss of bowel control  · Fever and chills  · Bloody stools  In some cases, antibiotics may help to treat diarrhea. You may have a stool sample test. This is done to see what is causing your diarrhea, and if antibiotics will help treat it. The results of a stool sample test may take up to 2 days. The healthcare provider may not give you antibiotics until he or she has the stool test results.  Diarrhea can cause dehydration. This is the loss of too much water and other fluids from the body. When this occurs, body fluid must be replaced. This can be done with oral  rehydration solutions. Oral rehydration solutions are available at drugstores and grocery stores without a prescription.  Home care  Follow all instructions given by your healthcare provider. Rest at home for the next 24 hours, or until you feel better. Avoid caffeine, tobacco, and alcohol. These can make diarrhea, cramping, and pain worse.  If taking medicines:  · Dont take over-the-counter diarrhea or nausea medicines unless your healthcare provider tells you to.  · You may use acetaminophen or NSAID medicines like ibuprofen or naproxen to reduce pain and fever. Dont use these if you have chronic liver or kidney disease, or ever had a stomach ulcer or gastrointestinal bleeding. Don't use NSAID medicines if you are already taking one for another condition (like arthritis) or are on daily aspirin therapy (such as for heart disease or after a stroke). Talk with your healthcare provider first.  · If antibiotics were prescribed, be sure you take them until they are finished. Dont stop taking them even when you feel better. Antibiotics must be taken as a full course.  To prevent the spread of illness:  · Remember that washing with soap and water and using alcohol-based  is the best way to prevent the spread of infection.  · Clean the toilet after each use.  · Wash your hands before eating.  · Wash your hands before and after preparing food. Keep in mind that people with diarrhea or vomiting should not prepare food for others.  · Wash your hands after using cutting boards, countertops, and knives that have been in contact with raw foods.  · Wash and then peel fruits and vegetables.  · Keep uncooked meats away from cooked and ready-to-eat foods.  · Use a food thermometer when cooking. Cook poultry to at least 165°F (74°C). Cook ground meat (beef, veal, pork, lamb) to at least 160°F (71°C). Cook fresh beef, veal, lamb, and pork to at least 145°F (63°C).  · Dont eat raw or undercooked eggs (poached or roger  side up), poultry, meat, or unpasteurized milk and juices.  Food and drinks  The main goal while treating vomiting or diarrhea is to prevent dehydration. This is done by taking small amounts of liquids often.  · Keep in mind that liquids are more important than food right now.  · Drink only small amounts of liquids at a time.  · Dont force yourself to eat, especially if you are having cramping, vomiting, or diarrhea. Dont eat large amounts at a time, even if you are hungry.  · If you eat, avoid fatty, greasy, spicy, or fried foods.  · Dont eat dairy foods or drink milk if you have diarrhea. These can make diarrhea worse.  During the first 24 hours you can try:  · Oral rehydration solutions. Do not use sports drinks. They have too much sugar and not enough electrolytes.  · Soft drinks without caffeine  · Ginger ale  · Water (plain or flavored)  · Decaf tea or coffee  · Clear broth, consommé, or bouillon  · Gelatin, popsicles, or frozen fruit juice bars  The second 24 hours, if you are feeling better, you can add:  · Hot cereal, plain toast, bread, rolls, or crackers  · Plain noodles, rice, mashed potatoes, chicken noodle soup, or rice soup  · Unsweetened canned fruit (no pineapple)  · Bananas  As you recover:  · Limit fat intake to less than 15 grams per day. Dont eat margarine, butter, oils, mayonnaise, sauces, gravies, fried foods, peanut butter, meat, poultry, or fish.  · Limit fiber. Dont eat raw or cooked vegetables, fresh fruits except bananas, or bran cereals.  · Limit caffeine and chocolate.  · Limit dairy.  · Dont use spices or seasonings except salt.  · Go back to your normal diet over time, as you feel better and your symptoms improve.  · If the symptoms come back, go back to a simple diet or clear liquids.  Follow-up care  Follow up with your healthcare provider, or as advised. If a stool sample was taken or cultures were done, call the healthcare provider for the results as instructed.  Call  911  Call 911 if you have any of these symptoms:  · Trouble breathing  · Confusion  · Extreme drowsiness or trouble walking  · Loss of consciousness  · Rapid heart rate  · Chest pain  · Stiff neck  · Seizure  When to seek medical advice  Call your healthcare provider right away if any of these occur:  · Abdominal pain that gets worse  · Constant lower right abdominal pain  · Continued vomiting and inability to keep liquids down  · Diarrhea more than 5 times a day  · Blood in vomit or stool  · Dark urine or no urine for 8 hours, dry mouth and tongue, tiredness, weakness, or dizziness  · Drowsiness  · New rash  · You dont get better in 2 to 3 days  · Fever of 100.4°F (38°C) or higher that doesnt get lower with medicine  Date Last Reviewed: 1/3/2016  © 1769-4062 [employer]. [address] [place] [address], [place] [address]. All rights reserved. This information is not intended as a substitute for professional medical care. Always follow your healthcare professional's instructions.

## 2017-08-31 NOTE — PROGRESS NOTES
Subjective:       Patient ID: Yolanda Betts is a 47 y.o.B/ female.    Chief Complaint: Hypertension    HPI         She comes in today and is accompanied by her daughter and granddaughter and has the above problem.  She lives alone.  About 10 days ago, she developed diarrhea and also had nausea but no vomiting.  It seems like she was having rapid transit of anything she put in her mouth straight through to diarrhea.  She was trying to take Powerade.  She went to urgent care on the 22nd here in the clinic and they had put her on been till and Zofran.  She never took the Zofran because she ended up going to the ER after visit to [facility] after-hours where they wanted to put her on Phenergan 25 mg and also Diflucan because of a yeast infection she develop.  None of these medicines of helped her to this point in the last thing she is actually been taking has been the Phenergan.  She does have an appetite but she's afraid to eat.  Her last episode of diarrhea was about 6 AM today.  She has not had anything to eat or drink since that time because she's afraid to.  She did feel chilly and sweaty little on Tuesday and Wednesday but did not feel lightheaded or faint.  Her blood sugar has risen during this course of her diarrhea.  She also states her blood pressure has been up to high 140s/high 90s on several occasions but is also been normal on more occasions that it's been abnormal.  This has scared her to the point she thinks her blood pressure is now problem.        She denies any problems with: Fever, rigors, headache, lightheadedness, dizziness, earache, sore throat, difficulty swallowing, acid her reflux, belching, excessive flatus, respiratory symptoms or  symptoms.        She did have a colonoscopy done about 10 years ago but they never did say whether she had diverticulosis or not.  She was just told that her bowel was normal at that time.  Her last traveling by car was to [facility] about 4 weeks ago.  She  did not drink the water over there because she always drank bottled water.  She lives alone so there has been no exposure to family that could've caused this.    Review of Systems    Otherwise negative concerning the ENT, RESPIRATORY, PULMONARY, CV, GI, , and GYN system review.    Objective:      Physical Exam    She is well-hydrated there is no tenting of the skin.  Her mouth is well-hydrated.  ENT: All within normal limits.  She does not have any redness present in the posterior pharynx, soft palate, or inside the nares.  She also has no tenderness to her cervical glands or adenopathy present.  CHEST: Clear BS anterior to posterior.  She's not in any respiratory distress.  There is no wheeze, rhonchi or rales.  ABDOMEN: Exogenously obese and has an apple shape obesity.  Bowel sounds are normal to slightly hypoactive throughout all quads.  She is nontender with palpation.  There is no guarding or rebound.  No organomegaly or mass felt with palpation or percussion.  She is nonicteric in both the skin and the eyes.    Assessment:       1. Chronic diarrhea        Plan:     1.  Stop the Phenergan for now.  Start taking the Zofran 4 mg ODT that she already has only if she's nauseous.  2.  Soft diet as tolerated and I gave her several examples.  She is also given an info sheet AVS concerning diet during the time she has the diarrhea.  3.  Start Cipro 500 mg twice a day ×5 days.  4.  Start Lomotil 2 tablets now on 1 tablet and 6 hours time if she does continue to have watery bowel movements.  Otherwise stop the medicine after the first dose.  5.  Recheck in for 5 days a symptoms persist.  We may need to get a WBC and also stool for culture.

## 2017-09-03 ENCOUNTER — HOSPITAL ENCOUNTER (EMERGENCY)
Facility: HOSPITAL | Age: 47
Discharge: HOME | End: 2017-09-03
Attending: EMERGENCY MEDICINE
Payer: MEDICAID

## 2017-09-03 VITALS
TEMPERATURE: 99 F | DIASTOLIC BLOOD PRESSURE: 95 MMHG | RESPIRATION RATE: 18 BRPM | SYSTOLIC BLOOD PRESSURE: 161 MMHG | HEIGHT: 56 IN | BODY MASS INDEX: 50.16 KG/M2 | OXYGEN SATURATION: 98 % | HEART RATE: 96 BPM | WEIGHT: 223 LBS

## 2017-09-03 DIAGNOSIS — R19.7 DIARRHEA, UNSPECIFIED TYPE: Primary | ICD-10-CM

## 2017-09-03 PROCEDURE — 99283 EMERGENCY DEPT VISIT LOW MDM: CPT

## 2017-09-03 PROCEDURE — 25000003 PHARM REV CODE 250: Performed by: EMERGENCY MEDICINE

## 2017-09-03 RX ORDER — TRAMADOL HYDROCHLORIDE 50 MG/1
50 TABLET ORAL
Status: COMPLETED | OUTPATIENT
Start: 2017-09-03 | End: 2017-09-03

## 2017-09-03 RX ORDER — METRONIDAZOLE 500 MG/1
500 TABLET ORAL 3 TIMES DAILY
Qty: 21 TABLET | Refills: 0 | Status: SHIPPED | OUTPATIENT
Start: 2017-09-03 | End: 2017-09-10

## 2017-09-03 RX ADMIN — TRAMADOL HYDROCHLORIDE 50 MG: 50 TABLET, COATED ORAL at 05:09

## 2017-09-03 NOTE — ED PROVIDER NOTES
SCRIBE #1 NOTE: I, Nereyda Mccarty, am scribing for, and in the presence of, Jeb Argueta Jr., MD. I have scribed the entire note.      History      Chief Complaint   Patient presents with    Diarrhea     patient complain of diarrhea for 2 weeks, abdominal pain        Review of patient's allergies indicates:   Allergen Reactions    Lortab [hydrocodone-acetaminophen] Itching    Neuromuscular blockers, steroidal      some    Morphine Rash     itching    Seconal [secobarbital sodium] Rash     itching    Tylox [oxycodone-acetaminophen] Rash        HPI   HPI    9/3/2017, 5:23 PM   History obtained from the patient      History of Present Illness: Yolanda Betts is a 47 y.o. female patient with a PMHx of diabetes and HTN who presents to the Emergency Department for diarrhea which onset gradually 2 weeks ago. Symptoms are episodic and moderate in severity. Pt states that she has 6-7 episodes per day. Pt states that she was seen by Dr. Quiros and was started on cipro, zofran, bentyl, and lomotil with no improvement. No mitigating or exacerbating factors reported. Associated sxs include subjective fever, chills, abd pain, and HA. Patient denies any dysuria, hematuria, constipation, hematochezia, urinary frequency, nausea, vomiting, dizziness, and all other sxs at this time. No further complaints or concerns at this time.         Arrival mode: Personal vehicle      PCP: Vish Stafford MD       Past Medical History:  Past Medical History:   Diagnosis Date    Anemia     Anxiety     Asthma 10/11/2016    Bipolar 1 disorder     GERD (gastroesophageal reflux disease)     Hyperlipidemia        Past Surgical History:  Past Surgical History:   Procedure Laterality Date    BELT ABDOMINOPLASTY      BREAST SURGERY Bilateral     Reduction     SECTION      X 2    COLONOSCOPY      HYSTERECTOMY      MOUTH SURGERY  1996    TUBAL LIGATION           Family History:  Family History   Problem Relation  Age of Onset    Diabetes Mother     Hyperlipidemia Mother     Hypertension Mother     Asthma Mother     COPD Mother     Glaucoma Mother     Thyroid disease Mother     Hypertension Father     Hyperlipidemia Father     Cancer Father      Brain, lung, liver, kidney    Heart disease Maternal Grandmother     Hyperlipidemia Maternal Grandmother     Hypertension Maternal Grandmother     Cataracts Maternal Grandmother     Diabetes Maternal Grandmother     Heart disease Maternal Grandfather     Hyperlipidemia Maternal Grandfather     Hypertension Maternal Grandfather     Glaucoma Maternal Grandfather     Cancer Maternal Grandfather     Cataracts Maternal Grandfather     Macular degeneration Maternal Grandfather     Diabetes Maternal Grandfather     Heart disease Paternal Grandmother     Hyperlipidemia Paternal Grandmother     Hypertension Paternal Grandmother     Cataracts Paternal Grandmother     Heart disease Paternal Grandfather     Hyperlipidemia Paternal Grandfather     Hypertension Paternal Grandfather     Cataracts Paternal Grandfather        Social History:  Social History     Social History Main Topics    Smoking status: Never Smoker    Smokeless tobacco: Never Used    Alcohol use 0.0 oz/week      Comment: socially    Drug use: No    Sexual activity: Unknown       ROS   Review of Systems   Constitutional: Positive for chills and fever.   HENT: Negative for sore throat.    Respiratory: Negative for shortness of breath.    Cardiovascular: Negative for chest pain.   Gastrointestinal: Positive for abdominal pain and diarrhea. Negative for blood in stool, constipation, nausea and vomiting.   Genitourinary: Negative for dysuria, frequency and hematuria.   Musculoskeletal: Negative for back pain.   Skin: Negative for rash.   Neurological: Positive for headaches. Negative for dizziness and weakness.   Hematological: Does not bruise/bleed easily.   All other systems reviewed and are  "negative.      Physical Exam      Initial Vitals [09/03/17 1656]   BP Pulse Resp Temp SpO2   (!) 161/95 96 18 98.6 °F (37 °C) 98 %      MAP       117          Physical Exam  Nursing Notes and Vital Signs Reviewed.  Constitutional: Patient is in no acute distress. Well-developed and well-nourished.  Head: Atraumatic. Normocephalic.  Eyes: PERRL. EOM intact. Conjunctivae are not pale. No scleral icterus.  ENT: Mucous membranes are moist. Oropharynx is clear and symmetric.    Neck: Supple. Full ROM. No lymphadenopathy.  Cardiovascular: Regular rate. Regular rhythm. No murmurs, rubs, or gallops. Distal pulses are 2+ and symmetric.  Pulmonary/Chest: No respiratory distress. Clear to auscultation bilaterally. No wheezing, rales, or rhonchi.  Abdominal: Soft and non-distended.  There is mild diffuse abd tenderness.  No rebound, guarding, or rigidity.   Musculoskeletal: Moves all extremities. No obvious deformities. No edema.   Skin: Warm and dry.  Neurological:  Alert, awake, and appropriate.  Normal speech.  No acute focal neurological deficits are appreciated.  Psychiatric: Normal affect. Good eye contact. Appropriate in content.    ED Course    Procedures  ED Vital Signs:  Vitals:    09/03/17 1656   BP: (!) 161/95   Pulse: 96   Resp: 18   Temp: 98.6 °F (37 °C)   TempSrc: Oral   SpO2: 98%   Weight: 101.2 kg (223 lb)   Height: 4' 8" (1.422 m)              The Emergency Provider reviewed the vital signs and test results, which are outlined above.    ED Discussion     5:30 Pm: Initial assessment. Pt is awake, alert, and in NAD at this time. Discussed with pt all pertinent ED information. Discussed pt dx and plan of tx. Gave pt all f/u and return to the ED instructions. All questions and concerns were addressed at this time. Pt expresses understanding of information and instructions, and is comfortable with plan to discharge. Pt is stable for discharge.    I discussed with patient and/or family/caretaker that evaluation in " the ED does not suggest any emergent or life threatening medical conditions requiring immediate intervention beyond what was provided in the ED, and I believe patient is safe for discharge.  Regardless, an unremarkable evaluation in the ED does not preclude the development or presence of a serious of life threatening condition. As such, patient was instructed to return immediately for any worsening or change in current symptoms.        ED Medication(s):  Medications   tramadol tablet 50 mg (not administered)       New Prescriptions    METRONIDAZOLE (FLAGYL) 500 MG TABLET    Take 1 tablet (500 mg total) by mouth 3 (three) times daily.       Follow-up Information     Vish Stafford MD. Call in 2 days.    Specialty:  Internal Medicine  Contact information:  [address]  [place] [address] [phone]                     Medical Decision Making              Scribe Attestation:   Scribe #1: I performed the above scribed service and the documentation accurately describes the services I performed. I attest to the accuracy of the note.    Attending:   Physician Attestation Statement for Scribe #1: I, Jeb Argueta Jr., MD, personally performed the services described in this documentation, as scribed by Nereyda Mccarty, in my presence, and it is both accurate and complete.          Clinical Impression       ICD-10-CM ICD-9-CM   1. Diarrhea, unspecified type R19.7 787.91       Disposition:   Disposition: Discharged  Condition: Stable         Jeb Argueta Jr., MD  09/04/17 0622

## 2017-09-03 NOTE — ED NOTES
Discharge instructions explained to patient by MD. Patient verbalizes understanding. Patient and discharge paperwork escorted to registration desk by RN at this time. Discharge paperwork given to registration for completion of discharge.

## 2017-09-03 NOTE — ED NOTES
Patient identifiers verified and correct for Yolanda Betts.    LOC: The patient is awake, alert and aware of environment with an appropriate affect, the patient is oriented x 3 and speaking appropriately.  APPEARANCE: Patient resting comfortably and in no acute distress, patient is clean and well groomed, patient's clothing is properly fastened.  SKIN: The skin is warm and dry, color consistent with ethnicity, patient has normal skin turgor and moist mucus membranes, skin intact, no breakdown or bruising noted.  MUSCULOSKELETAL: Patient moving all extremities spontaneously.  RESPIRATORY: Airway is open and patent, respirations are spontaneous.  CARDIAC: Patient has a normal rate, no periphreal edema noted, capillary refill < 3 seconds.  ABDOMEN: Soft and non tender to palpation.

## 2017-09-21 ENCOUNTER — TELEPHONE (OUTPATIENT)
Dept: INTERNAL MEDICINE | Facility: CLINIC | Age: 47
End: 2017-09-21

## 2017-09-21 NOTE — TELEPHONE ENCOUNTER
----- Message from Patricia Vasquez sent at 9/21/2017  2:12 PM CDT -----  Contact: Pt  Pt is requesting to speak to the nurse regarding her medication. Pls call pt back at [phone].

## 2017-09-21 NOTE — TELEPHONE ENCOUNTER
Pt ID'd by name on v/m.  Notified pt via v/m that I spoke with pharmacist who stated there are 8 meds for pt to  from the pharmacy and only one med required PA, the valsartan-HCTZ, which I've already initiated PA.

## 2017-09-21 NOTE — TELEPHONE ENCOUNTER
Pt stated that she needs refills of all of her medications but they all require prior authorizations since she has Medicaid.  Notified pt that I have already completed PA on valsartan-HCTZ.  Pt verbalized understanding and requested I contact her pharmacist at [phone] to find out exactly which meds need refills and which meds need PAs.    Pt stated she has had diarrhea for weeks and needs a new order for a colonoscopy.  Looked in [employer] and previous order from Dr. Barbour can still be scheduled.

## 2017-09-22 ENCOUNTER — TELEPHONE (OUTPATIENT)
Dept: INTERNAL MEDICINE | Facility: CLINIC | Age: 47
End: 2017-09-22

## 2017-09-22 DIAGNOSIS — I10 ESSENTIAL HYPERTENSION: Primary | ICD-10-CM

## 2017-09-22 NOTE — TELEPHONE ENCOUNTER
Prior authorization for valsartan-HCTZ has been denied.  Insurance requires pt try and fail at least 3 of the following: losartan-HCTZ, benazepril-HCTZ, captopril-HCTZ, enalapril-HCZ, fosinopril-HCTZ, lisinopril-HCTZ, and quinapril-HCTZ.

## 2017-09-25 ENCOUNTER — TELEPHONE (OUTPATIENT)
Dept: CARDIOLOGY | Facility: CLINIC | Age: 47
End: 2017-09-25

## 2017-09-25 RX ORDER — LOSARTAN POTASSIUM AND HYDROCHLOROTHIAZIDE 25; 100 MG/1; MG/1
1 TABLET ORAL DAILY
Qty: 90 TABLET | Refills: 3 | Status: SHIPPED | OUTPATIENT
Start: 2017-09-25 | End: 2019-05-02

## 2017-09-25 NOTE — TELEPHONE ENCOUNTER
----- Message from Shannon Pfeiffer sent at 9/21/2017  2:30 PM CDT -----  Contact: pt  The pt request a call concerning a prio authorization on her medication, pt can be reached at [phone]///[id number]

## 2017-09-25 NOTE — TELEPHONE ENCOUNTER
Returned phone call to patient and she stated she thinks it's her Pravastatin   Contacted [facility] [phone] to clarify what medication needs PA and Pharmacist stated at this time nothing is pending or in need of PA

## 2017-09-28 RX ORDER — INSULIN LISPRO 100 [IU]/ML
16 INJECTION, SOLUTION INTRAVENOUS; SUBCUTANEOUS
Qty: 1 BOX | Refills: 1 | Status: SHIPPED | OUTPATIENT
Start: 2017-09-28 | End: 2017-11-22

## 2017-09-28 RX ORDER — INSULIN GLARGINE 300 [IU]/ML
50 INJECTION, SOLUTION SUBCUTANEOUS NIGHTLY
Qty: 7.5 ML | Refills: 1 | Status: SHIPPED | OUTPATIENT
Start: 2017-09-28 | End: 2018-02-08 | Stop reason: SDUPTHER

## 2017-09-28 RX ORDER — INSULIN LISPRO 100 [IU]/ML
16 INJECTION, SOLUTION INTRAVENOUS; SUBCUTANEOUS
COMMUNITY
End: 2017-09-28 | Stop reason: SDUPTHER

## 2017-09-28 NOTE — TELEPHONE ENCOUNTER
Pt stated insurance will not cover levemir or novolog as prescribed by Dr. Mejia in endocrinology.  Requesting meds be changed.

## 2017-09-28 NOTE — TELEPHONE ENCOUNTER
Called pharmacy and changed Levemir to Lantus and Novolog to Humalog.  They will notify me if these are not covered as well. Patient notified of this

## 2017-09-28 NOTE — TELEPHONE ENCOUNTER
----- Message from Lily Landry sent at 9/28/2017  9:55 AM CDT -----  ins isn't paying for levamir or novalog, pls change insulin...[phone] (home)

## 2017-09-28 NOTE — TELEPHONE ENCOUNTER
Pharmacist told me wrong.  Humalog is covered.  Lantus wasn't and they want Tuojeo instead of Lantus and Levemir.  Can us send in that one.

## 2017-09-28 NOTE — TELEPHONE ENCOUNTER
Spoke with pharmacist. Lantus was covered but Humalog isn't either.  Plan suggests Tujeo instead.  Do you want to prescribe this. If so, please send in new script. She was taking Novolog 18 units with meals

## 2017-11-09 RX ORDER — FLUTICASONE PROPIONATE AND SALMETEROL 50; 250 UG/1; UG/1
POWDER RESPIRATORY (INHALATION)
Qty: 60 EACH | Refills: 11 | Status: SHIPPED | OUTPATIENT
Start: 2017-11-09 | End: 2019-05-02 | Stop reason: SDUPTHER

## 2017-11-22 RX ORDER — INSULIN LISPRO 100 [IU]/ML
INJECTION, SOLUTION INTRAVENOUS; SUBCUTANEOUS
Qty: 20 ML | Refills: 1 | Status: SHIPPED | OUTPATIENT
Start: 2017-11-22 | End: 2018-01-14 | Stop reason: SDUPTHER

## 2017-12-08 ENCOUNTER — PATIENT OUTREACH (OUTPATIENT)
Dept: ADMINISTRATIVE | Facility: HOSPITAL | Age: 47
End: 2017-12-08

## 2017-12-21 ENCOUNTER — TELEPHONE (OUTPATIENT)
Dept: INTERNAL MEDICINE | Facility: CLINIC | Age: 47
End: 2017-12-21

## 2017-12-21 ENCOUNTER — OFFICE VISIT (OUTPATIENT)
Dept: INTERNAL MEDICINE | Facility: CLINIC | Age: 47
End: 2017-12-21
Payer: MEDICAID

## 2017-12-21 VITALS
OXYGEN SATURATION: 98 % | TEMPERATURE: 98 F | RESPIRATION RATE: 16 BRPM | HEIGHT: 56 IN | WEIGHT: 224.13 LBS | SYSTOLIC BLOOD PRESSURE: 124 MMHG | HEART RATE: 90 BPM | BODY MASS INDEX: 50.42 KG/M2 | DIASTOLIC BLOOD PRESSURE: 92 MMHG

## 2017-12-21 DIAGNOSIS — E66.01 MORBID OBESITY WITH BMI OF 50.0-59.9, ADULT: Chronic | ICD-10-CM

## 2017-12-21 DIAGNOSIS — E78.1 HYPERTRIGLYCERIDEMIA: Chronic | ICD-10-CM

## 2017-12-21 DIAGNOSIS — E11.42 TYPE 2 DIABETES MELLITUS WITH DIABETIC POLYNEUROPATHY, WITH LONG-TERM CURRENT USE OF INSULIN: ICD-10-CM

## 2017-12-21 DIAGNOSIS — I10 ESSENTIAL HYPERTENSION: Primary | Chronic | ICD-10-CM

## 2017-12-21 DIAGNOSIS — Z23 NEED FOR DIPHTHERIA-TETANUS-PERTUSSIS (TDAP) VACCINE: ICD-10-CM

## 2017-12-21 DIAGNOSIS — Z79.4 TYPE 2 DIABETES MELLITUS WITH DIABETIC POLYNEUROPATHY, WITH LONG-TERM CURRENT USE OF INSULIN: ICD-10-CM

## 2017-12-21 DIAGNOSIS — I10 ESSENTIAL HYPERTENSION: ICD-10-CM

## 2017-12-21 DIAGNOSIS — Z23 NEED FOR PROPHYLACTIC VACCINATION WITH TETANUS-DIPHTHERIA (TD): ICD-10-CM

## 2017-12-21 DIAGNOSIS — R00.2 PALPITATIONS: ICD-10-CM

## 2017-12-21 DIAGNOSIS — E11.42 DIABETIC POLYNEUROPATHY ASSOCIATED WITH TYPE 2 DIABETES MELLITUS: Chronic | ICD-10-CM

## 2017-12-21 PROBLEM — E11.9 DIABETES MELLITUS, TYPE 2: Status: ACTIVE | Noted: 2017-12-21

## 2017-12-21 PROCEDURE — 99999 PR PBB SHADOW E&M-EST. PATIENT-LVL V: CPT | Mod: PBBFAC,,, | Performed by: PHYSICIAN ASSISTANT

## 2017-12-21 PROCEDURE — 90472 IMMUNIZATION ADMIN EACH ADD: CPT | Mod: PBBFAC,PO

## 2017-12-21 PROCEDURE — 90471 IMMUNIZATION ADMIN: CPT | Mod: PBBFAC,PO

## 2017-12-21 PROCEDURE — 99215 OFFICE O/P EST HI 40 MIN: CPT | Mod: PBBFAC,25,PO | Performed by: PHYSICIAN ASSISTANT

## 2017-12-21 PROCEDURE — 99214 OFFICE O/P EST MOD 30 MIN: CPT | Mod: S$PBB,,, | Performed by: PHYSICIAN ASSISTANT

## 2017-12-21 RX ORDER — VERAPAMIL HYDROCHLORIDE 180 MG/1
180 CAPSULE, EXTENDED RELEASE ORAL DAILY
Qty: 30 CAPSULE | Refills: 3 | Status: SHIPPED | OUTPATIENT
Start: 2017-12-21 | End: 2018-05-22 | Stop reason: SDUPTHER

## 2017-12-21 NOTE — TELEPHONE ENCOUNTER
Per fax request received from pharmacy, initiated prior auth request to [employer] Dept for fenofibate 48mg, submitted online via covermymeds.[email or web address] Request [id number]; faxed notification to pharmacy advising them PA initiated and will alert them when we receive a response, fax transmission confirmed F#[phone].

## 2017-12-21 NOTE — PROGRESS NOTES
Subjective:       Patient ID: Yolanda Betts is a 47 y.o.B/ female.    Chief Complaint: Follow-up; Diabetes; and Hypertension    HPI         She comes in today by herself and has the above need.  This is just basically routine follow-up for her medical problems.  She also wants to get her flu shot.  She wants her blood pressure checked.  She has been having a lot of problem with fibromyalgia mainly because she has been working out of treadmill every day for 30 minutes to try and lose weight.  She does this at a local gym.  She also wants to consider getting liposuction in order to lose weight.  She wasn't aware there are surgical procedures other than that to help her lose weight such as a stomach balloon, stomach sleeve, or stomach bypass.  She would be interested in one of those past 3 procedures.        She also needs to have her test to see how bad her diabetes is.  She thinks her diabetes is bad because he has been running about 192-- 200 at home.  Her last Hgb A1c was 10+.  She thinks she needs to go on oral medicine other than just taking insulin.  She has never been up to see anybody in diabetic teaching and she does not see an endocrinologist outside the clinic.  She does have some paresthesias of her fingertips and also of her toes.  She is on Cymbalta for the past 2 years and it has cut down on the foot pain that she was experiencing but it hasn't completely eliminated the paresthesias.  She has no open cuts or sores that are slow to heal at this point    Review of Systems   Otherwise negative concerning the ENDOCRINE, CV, VASCULAR, RENAL, MUSCULOSKELETAL, ORTHOPEDIC, and GI system review.  She does get an ophthalmic check once a year to check for glaucoma.     Objective:      Physical Exam    ENT: All within normal limits.  CHEST: Clear BS anterior to posterior.  HEART: RSR.  Pulse is 86 bpm and regular.  S1 is greater than S2.  There is no murmur or gallop.  She has no peripheral edema.  ABDOMEN:  Exogenously obese but soft and nontender.  Bowel sounds are normal in all quads.  There is no organomegaly or mass felt palpation percussion.  FEET: Monofilament testing was normal bilaterally.  She recognized me touching her skin 100% of the time.  She has no tinea pedis and there are no calous buildups or bunions.    Assessment:       1. Essential hypertension    2. Hypertriglyceridemia    3. Morbid obesity with BMI of 50.0-59.9, adult    4. Diabetic polyneuropathy associated with type 2 diabetes mellitus    5. Type 2 diabetes mellitus with diabetic polyneuropathy, with long-term current use of insulin        Plan:     1.  Will order CPX labs to include: CBC, Chem-20, FLP's, TSH, Hgb A1c, urine for microalbuminuria.  2.  Also ordered influenza immunization and also Adacel.  3.  Schedule mammogram screening  4.  Arrange appointment in general surgery with Dr. Nichelle MATIAS for possible weight reduction surgery or procedure.  5.  Schedule appointment with diabetic teaching for manipulation of her diabetic medicines.  6.  Have her take her medicines to the pharmacy and ask them to call us for okay on refills as needed.  7.  Follow-up with her PCP Dr. Nydia MATIAS in the near future.

## 2017-12-22 ENCOUNTER — LAB VISIT (OUTPATIENT)
Dept: LAB | Facility: HOSPITAL | Age: 47
End: 2017-12-22
Attending: PHYSICIAN ASSISTANT
Payer: MEDICAID

## 2017-12-22 DIAGNOSIS — I10 ESSENTIAL HYPERTENSION: Chronic | ICD-10-CM

## 2017-12-22 DIAGNOSIS — E11.42 DIABETIC POLYNEUROPATHY ASSOCIATED WITH TYPE 2 DIABETES MELLITUS: Chronic | ICD-10-CM

## 2017-12-22 DIAGNOSIS — E66.01 MORBID OBESITY WITH BMI OF 50.0-59.9, ADULT: Chronic | ICD-10-CM

## 2017-12-22 DIAGNOSIS — E78.1 HYPERTRIGLYCERIDEMIA: Chronic | ICD-10-CM

## 2017-12-22 LAB
ALBUMIN SERPL BCP-MCNC: 3.7 G/DL
ALP SERPL-CCNC: 89 U/L
ALT SERPL W/O P-5'-P-CCNC: 62 U/L
ANION GAP SERPL CALC-SCNC: 10 MMOL/L
AST SERPL-CCNC: 45 U/L
BASOPHILS # BLD AUTO: 0.03 K/UL
BASOPHILS NFR BLD: 0.4 %
BILIRUB SERPL-MCNC: 0.4 MG/DL
BUN SERPL-MCNC: 8 MG/DL
CALCIUM SERPL-MCNC: 9.5 MG/DL
CHLORIDE SERPL-SCNC: 102 MMOL/L
CHOLEST SERPL-MCNC: 102 MG/DL
CHOLEST/HDLC SERPL: 2.4 {RATIO}
CO2 SERPL-SCNC: 27 MMOL/L
CREAT SERPL-MCNC: 0.7 MG/DL
DIFFERENTIAL METHOD: ABNORMAL
EOSINOPHIL # BLD AUTO: 0.1 K/UL
EOSINOPHIL NFR BLD: 1.6 %
ERYTHROCYTE [DISTWIDTH] IN BLOOD BY AUTOMATED COUNT: 15.9 %
EST. GFR  (AFRICAN AMERICAN): >60 ML/MIN/1.73 M^2
EST. GFR  (NON AFRICAN AMERICAN): >60 ML/MIN/1.73 M^2
ESTIMATED AVG GLUCOSE: 192 MG/DL
GLUCOSE SERPL-MCNC: 130 MG/DL
HBA1C MFR BLD HPLC: 8.3 %
HCT VFR BLD AUTO: 36.5 %
HDLC SERPL-MCNC: 43 MG/DL
HDLC SERPL: 42.2 %
HGB BLD-MCNC: 11.5 G/DL
LDLC SERPL CALC-MCNC: 33.6 MG/DL
LYMPHOCYTES # BLD AUTO: 1.8 K/UL
LYMPHOCYTES NFR BLD: 25.2 %
MCH RBC QN AUTO: 26.9 PG
MCHC RBC AUTO-ENTMCNC: 31.5 G/DL
MCV RBC AUTO: 86 FL
MONOCYTES # BLD AUTO: 0.3 K/UL
MONOCYTES NFR BLD: 4.1 %
NEUTROPHILS # BLD AUTO: 5 K/UL
NEUTROPHILS NFR BLD: 68.7 %
NONHDLC SERPL-MCNC: 59 MG/DL
PLATELET # BLD AUTO: 277 K/UL
PMV BLD AUTO: 10 FL
POTASSIUM SERPL-SCNC: 3.9 MMOL/L
PROT SERPL-MCNC: 7.4 G/DL
RBC # BLD AUTO: 4.27 M/UL
SODIUM SERPL-SCNC: 139 MMOL/L
TRIGL SERPL-MCNC: 127 MG/DL
TSH SERPL DL<=0.005 MIU/L-ACNC: 1.85 UIU/ML
WBC # BLD AUTO: 7.29 K/UL

## 2017-12-22 PROCEDURE — 84443 ASSAY THYROID STIM HORMONE: CPT

## 2017-12-22 PROCEDURE — 85025 COMPLETE CBC W/AUTO DIFF WBC: CPT | Mod: PO

## 2017-12-22 PROCEDURE — 80061 LIPID PANEL: CPT | Mod: PO

## 2017-12-22 PROCEDURE — 83036 HEMOGLOBIN GLYCOSYLATED A1C: CPT

## 2017-12-22 PROCEDURE — 80053 COMPREHEN METABOLIC PANEL: CPT | Mod: PO

## 2017-12-22 PROCEDURE — 36415 COLL VENOUS BLD VENIPUNCTURE: CPT | Mod: PO

## 2017-12-28 ENCOUNTER — TELEPHONE (OUTPATIENT)
Dept: INTERNAL MEDICINE | Facility: CLINIC | Age: 47
End: 2017-12-28

## 2017-12-28 NOTE — TELEPHONE ENCOUNTER
Received denial of coverage/PA from pt's insurance on fenofibrate 48mg. Faxed coupon from [employer] to pt's pharmacy notifying them coverage denied by pt's insurance and pt will have to pay out-of-pocket/cash for rx, and instructed pharmacy to apply coupon to pt's rx, fax transmission confirmed F#[phone].

## 2018-01-05 ENCOUNTER — TELEPHONE (OUTPATIENT)
Dept: INTERNAL MEDICINE | Facility: CLINIC | Age: 48
End: 2018-01-05

## 2018-01-05 NOTE — TELEPHONE ENCOUNTER
----- Message from Sepideh Sanders sent at 1/5/2018  1:07 PM CST -----  Contact: pt   Pt returning nurses call,, please call pt back at [phone]

## 2018-01-14 RX ORDER — INSULIN LISPRO 100 [IU]/ML
INJECTION, SOLUTION INTRAVENOUS; SUBCUTANEOUS
Qty: 20 ML | Refills: 1 | Status: SHIPPED | OUTPATIENT
Start: 2018-01-14 | End: 2018-02-08

## 2018-01-17 RX ORDER — GABAPENTIN 300 MG/1
CAPSULE ORAL
Qty: 90 CAPSULE | Refills: 0 | Status: SHIPPED | OUTPATIENT
Start: 2018-01-17 | End: 2019-05-02

## 2018-01-22 ENCOUNTER — TELEPHONE (OUTPATIENT)
Dept: INTERNAL MEDICINE | Facility: CLINIC | Age: 48
End: 2018-01-22

## 2018-01-22 NOTE — TELEPHONE ENCOUNTER
----- Message from Josselyn Duong sent at 1/22/2018  2:21 PM CST -----  Contact: Patient  Patient is checking on status of referrals to see Dr Steward and Millie Pruitt, please call her back at [phone]. Thank you

## 2018-01-25 ENCOUNTER — TELEPHONE (OUTPATIENT)
Dept: INTERNAL MEDICINE | Facility: CLINIC | Age: 48
End: 2018-01-25

## 2018-01-25 NOTE — TELEPHONE ENCOUNTER
Pt advised per [facility] in recep referrals (see endocrine and gen surg referrals 12/2017) pending due to pt's insurance, their offices should be calling pt to sched appts.

## 2018-01-26 ENCOUNTER — TELEPHONE (OUTPATIENT)
Dept: SURGERY | Facility: CLINIC | Age: 48
End: 2018-01-26

## 2018-01-26 NOTE — TELEPHONE ENCOUNTER
S/W pt via phone informed pt her upcoming appt/Consult Bariatric with Dr. Steward is on 4/10/2018/[facility] @ 10:00AM. Patient verbalized an understanding.

## 2018-02-07 DIAGNOSIS — E78.5 HYPERLIPIDEMIA, UNSPECIFIED HYPERLIPIDEMIA TYPE: ICD-10-CM

## 2018-02-07 RX ORDER — METFORMIN HYDROCHLORIDE 1000 MG/1
1000 TABLET ORAL 2 TIMES DAILY
Qty: 180 TABLET | Refills: 0 | Status: SHIPPED | OUTPATIENT
Start: 2018-02-07 | End: 2018-03-19 | Stop reason: SDUPTHER

## 2018-02-07 RX ORDER — PRAVASTATIN SODIUM 80 MG/1
TABLET ORAL
Qty: 30 TABLET | Refills: 4 | Status: SHIPPED | OUTPATIENT
Start: 2018-02-07 | End: 2019-05-02 | Stop reason: ALTCHOICE

## 2018-02-08 RX ORDER — INSULIN LISPRO 100 [IU]/ML
INJECTION, SOLUTION INTRAVENOUS; SUBCUTANEOUS
Qty: 20 ML | Refills: 1 | Status: SHIPPED | OUTPATIENT
Start: 2018-02-08 | End: 2018-04-17 | Stop reason: SDUPTHER

## 2018-02-08 RX ORDER — INSULIN GLARGINE 300 U/ML
50 INJECTION, SOLUTION SUBCUTANEOUS NIGHTLY
Qty: 13.5 SYRINGE | Refills: 1 | Status: SHIPPED | OUTPATIENT
Start: 2018-02-08 | End: 2018-07-24 | Stop reason: SDUPTHER

## 2018-02-13 ENCOUNTER — PATIENT MESSAGE (OUTPATIENT)
Dept: GASTROENTEROLOGY | Facility: CLINIC | Age: 48
End: 2018-02-13

## 2018-02-13 ENCOUNTER — TELEPHONE (OUTPATIENT)
Dept: INTERNAL MEDICINE | Facility: CLINIC | Age: 48
End: 2018-02-13

## 2018-02-13 NOTE — TELEPHONE ENCOUNTER
Pt requested to reschedule colonoscopy.  Colonoscopy scheduling phone number of [phone] given to pt.

## 2018-02-13 NOTE — TELEPHONE ENCOUNTER
----- Message from Lee Jimenez sent at 2/13/2018  9:15 AM CST -----  Contact: Pt  Pt request a call from the nurse to r/s her surgery, please contact the pt at [phone]

## 2018-02-19 ENCOUNTER — PATIENT OUTREACH (OUTPATIENT)
Dept: ADMINISTRATIVE | Facility: HOSPITAL | Age: 48
End: 2018-02-19

## 2018-02-19 NOTE — PROGRESS NOTES
Schedule annual mammogram on 04/10/2018 at 09:30 am. Patient in agreement and vocalize understanding. I will send appointment reminder in the mail today.

## 2018-02-26 ENCOUNTER — ANESTHESIA EVENT (OUTPATIENT)
Dept: ENDOSCOPY | Facility: HOSPITAL | Age: 48
End: 2018-02-26
Payer: MEDICAID

## 2018-02-26 ENCOUNTER — OFFICE VISIT (OUTPATIENT)
Dept: DIABETES | Facility: CLINIC | Age: 48
End: 2018-02-26
Payer: MEDICAID

## 2018-02-26 ENCOUNTER — OFFICE VISIT (OUTPATIENT)
Dept: URGENT CARE | Facility: CLINIC | Age: 48
End: 2018-02-26
Payer: MEDICAID

## 2018-02-26 ENCOUNTER — TELEPHONE (OUTPATIENT)
Dept: GASTROENTEROLOGY | Facility: CLINIC | Age: 48
End: 2018-02-26

## 2018-02-26 ENCOUNTER — TELEPHONE (OUTPATIENT)
Dept: INTERNAL MEDICINE | Facility: CLINIC | Age: 48
End: 2018-02-26

## 2018-02-26 ENCOUNTER — LAB VISIT (OUTPATIENT)
Dept: LAB | Facility: HOSPITAL | Age: 48
End: 2018-02-26
Attending: PHYSICIAN ASSISTANT
Payer: MEDICAID

## 2018-02-26 VITALS
BODY MASS INDEX: 49.77 KG/M2 | DIASTOLIC BLOOD PRESSURE: 90 MMHG | WEIGHT: 221.25 LBS | SYSTOLIC BLOOD PRESSURE: 128 MMHG | TEMPERATURE: 98 F | OXYGEN SATURATION: 97 % | HEIGHT: 56 IN | HEART RATE: 75 BPM | RESPIRATION RATE: 18 BRPM

## 2018-02-26 VITALS
HEIGHT: 56 IN | DIASTOLIC BLOOD PRESSURE: 90 MMHG | WEIGHT: 221.31 LBS | BODY MASS INDEX: 49.78 KG/M2 | SYSTOLIC BLOOD PRESSURE: 134 MMHG

## 2018-02-26 DIAGNOSIS — G89.4 CHRONIC PAIN DISORDER: ICD-10-CM

## 2018-02-26 DIAGNOSIS — E11.42 TYPE 2 DIABETES MELLITUS WITH DIABETIC POLYNEUROPATHY, WITH LONG-TERM CURRENT USE OF INSULIN: ICD-10-CM

## 2018-02-26 DIAGNOSIS — Z79.4 TYPE 2 DIABETES MELLITUS WITH DIABETIC POLYNEUROPATHY, WITH LONG-TERM CURRENT USE OF INSULIN: Primary | ICD-10-CM

## 2018-02-26 DIAGNOSIS — K76.0 NAFLD (NONALCOHOLIC FATTY LIVER DISEASE): ICD-10-CM

## 2018-02-26 DIAGNOSIS — Z79.4 TYPE 2 DIABETES MELLITUS WITH DIABETIC POLYNEUROPATHY, WITH LONG-TERM CURRENT USE OF INSULIN: ICD-10-CM

## 2018-02-26 DIAGNOSIS — E11.42 TYPE 2 DIABETES MELLITUS WITH DIABETIC POLYNEUROPATHY, WITH LONG-TERM CURRENT USE OF INSULIN: Primary | ICD-10-CM

## 2018-02-26 DIAGNOSIS — S29.011A MUSCLE STRAIN OF CHEST WALL, INITIAL ENCOUNTER: Primary | ICD-10-CM

## 2018-02-26 LAB
C PEPTIDE SERPL-MCNC: 4.56 NG/ML
CA-I BLDV-SCNC: 1.21 MMOL/L
GLUCOSE SERPL-MCNC: 194 MG/DL (ref 70–110)
PTH-INTACT SERPL-MCNC: 105 PG/ML

## 2018-02-26 PROCEDURE — 3008F BODY MASS INDEX DOCD: CPT | Mod: ,,, | Performed by: PHYSICIAN ASSISTANT

## 2018-02-26 PROCEDURE — 99999 PR PBB SHADOW E&M-EST. PATIENT-LVL V: CPT | Mod: PBBFAC,,, | Performed by: PHYSICIAN ASSISTANT

## 2018-02-26 PROCEDURE — 99215 OFFICE O/P EST HI 40 MIN: CPT | Mod: PBBFAC,27,PO | Performed by: PHYSICIAN ASSISTANT

## 2018-02-26 PROCEDURE — 83970 ASSAY OF PARATHORMONE: CPT

## 2018-02-26 PROCEDURE — 82330 ASSAY OF CALCIUM: CPT

## 2018-02-26 PROCEDURE — 99214 OFFICE O/P EST MOD 30 MIN: CPT | Mod: PBBFAC,PO | Performed by: PHYSICIAN ASSISTANT

## 2018-02-26 PROCEDURE — 99214 OFFICE O/P EST MOD 30 MIN: CPT | Mod: S$PBB,,, | Performed by: PHYSICIAN ASSISTANT

## 2018-02-26 PROCEDURE — 99999 PR PBB SHADOW E&M-EST. PATIENT-LVL IV: CPT | Mod: PBBFAC,,, | Performed by: PHYSICIAN ASSISTANT

## 2018-02-26 PROCEDURE — 86341 ISLET CELL ANTIBODY: CPT

## 2018-02-26 PROCEDURE — 99215 OFFICE O/P EST HI 40 MIN: CPT | Mod: S$PBB,,, | Performed by: PHYSICIAN ASSISTANT

## 2018-02-26 PROCEDURE — 82948 REAGENT STRIP/BLOOD GLUCOSE: CPT | Mod: PBBFAC,PO | Performed by: PHYSICIAN ASSISTANT

## 2018-02-26 PROCEDURE — 86341 ISLET CELL ANTIBODY: CPT | Mod: 91

## 2018-02-26 PROCEDURE — 84681 ASSAY OF C-PEPTIDE: CPT

## 2018-02-26 RX ORDER — POLYETHYLENE GLYCOL 3350, SODIUM SULFATE ANHYDROUS, SODIUM BICARBONATE, SODIUM CHLORIDE, POTASSIUM CHLORIDE 236; 22.74; 6.74; 5.86; 2.97 G/4L; G/4L; G/4L; G/4L; G/4L
4 POWDER, FOR SOLUTION ORAL ONCE
Qty: 4000 ML | Refills: 0 | Status: ON HOLD | OUTPATIENT
Start: 2018-02-26 | End: 2018-02-28 | Stop reason: CLARIF

## 2018-02-26 RX ORDER — CYCLOBENZAPRINE HCL 10 MG
10 TABLET ORAL 3 TIMES DAILY PRN
Qty: 30 TABLET | Refills: 0 | Status: SHIPPED | OUTPATIENT
Start: 2018-02-26 | End: 2018-03-08

## 2018-02-26 RX ORDER — NAPROXEN 500 MG/1
500 TABLET ORAL EVERY 12 HOURS PRN
Qty: 20 TABLET | Refills: 0 | Status: SHIPPED | OUTPATIENT
Start: 2018-02-26 | End: 2019-05-02 | Stop reason: SDUPTHER

## 2018-02-26 RX ORDER — KETOROLAC TROMETHAMINE 30 MG/ML
60 INJECTION, SOLUTION INTRAMUSCULAR; INTRAVENOUS
Status: COMPLETED | OUTPATIENT
Start: 2018-02-26 | End: 2018-02-26

## 2018-02-26 RX ADMIN — KETOROLAC TROMETHAMINE 60 MG: 60 INJECTION, SOLUTION INTRAMUSCULAR at 11:02

## 2018-02-26 NOTE — TELEPHONE ENCOUNTER
Message left for patient to return call to clinic re: colonoscopy was ordered by Dr. Barbour's office and PA should be completed by colonoscopy dept.  Pt can contact colonoscopy dept at [phone].

## 2018-02-26 NOTE — PATIENT INSTRUCTIONS
Thoracic Spine Strain  The thoracic spine is the middle part of the back between the neck and the lower back. A thoracic spine strain is due to stretching and tearing of the muscle fibers that support the spine. This may happen because of severe coughing or heavy lifting. Or it may be caused by twisting injuries of the upper back, such as from a fall or a car or bike accident.        This often causes increased pain when you move or breathe deeply. This may take 3 to 6 weeks to heal.  Home care  · Rest. Avoid heavy lifting or hard work. Avoid any activity that causes pain.  · You may find relief with heat (hot shower, hot bath or heating pad) and massage. Or you may prefer cold packs. Try both and use the method that feels best for 20 minutes several times a day. To make an ice pack, put ice cubes in a plastic bag that seals at the top. Wrap the bag in a clean, thin towel or cloth. Never put ice or an ice pack directly on your skin.  · If you have a severe cough, use an over-the-counter cough medicine unless another cough medicine was prescribed.  · You may use over-the-counter pain medicine to control pain, unless another medicine was prescribed. Talk with your provider before using these medicines if you have chronic liver or kidney disease or ever had a stomach ulcer or GI bleeding.  Follow-up care  Follow up with your healthcare provider, or as directed.  When to seek medical advice  Call your healthcare provider right away if you have:  · A change in the type of pain: if it feels different, becomes more severe, lasts longer, or begins to spread into your shoulder, arm, neck, jaw or back  Call 911  Call 911 if you have:  · Shortness of breath or increased pain with breathing  · Cough with dark colored sputum (phlegm) or blood  · Weakness, dizziness, or fainting  · Numbness or weakness in one or both legs or arms  Date Last Reviewed: 11/19/2015  © 6195-4179 [employer]. [address] [place] [address],  [place] [address]. All rights reserved. This information is not intended as a substitute for professional medical care. Always follow your healthcare professional's instructions.      Rest  Ice pack or heating pad, may alternate ice and heat  Naprosyn for pain as needed. Start this tomorrow since you had Toradol injection today.  Flexeril as needed for muscle spasms.     If symptoms worsen or fail to improve, follow-up with primary care doctor or nearest ER.

## 2018-02-26 NOTE — PROGRESS NOTES
PCP: Vish Stafford MD    Subjective:    Patient ID: Yolanda Betts is a 47 y.o. female.    PCP: Vish Stafford MD      Yolanda Betts is a pleasant 47 y.o. female presenting for her initial evaluation with me establish care and follow up on diabetes mellitus. She has had diabetes for 12 or more years. Since his last visit she has had mild improvement in her glycemia. Her blood sugar range fasting has been 200+ and 2 hour post meal has been 300+, and she has been monitoring 2 times per day. Her current concerns are glycemic control.  She is to be enrolled in diabetes education classes.     Her comorbid conditions are, Diabetes Type 2, Hypertension, Hyperlipidemia, Fatty Liver Disease, Obesity, Bipolar disorder, and fibromyalgia.     She has the following diabetic complications, without complications    She denies any hospital admissions, emergency room visits, hypoglycemia, syncope, diaphoresis, chest pain, or dyspnea.    She has lost 2 pounds since last visit. Her BMI is 49.62 kg/m²    Her blood sugar in the clinic today was:   Lab Results   Component Value Date    POCGLU 194 (A) 02/26/2018     We discussed the American diabetes Association recommendations:  hemoglobin A1c below 7.0%; all diabetics should be on statins unless contraindicated; one aspirin daily unless contraindicated; fasting blood sugar between 80 and 130 mg/dL; postprandial blood sugar below 180 mg/dl; prevention of hypoglycemia, may adjust goals to higher levels if persistent; ACE or ARB therapy if not contraindicated; and maintain in an ideal body weight with BMI below 25.    Yolanda is noncompliant some of the time with DM medications.     Yolanda is noncompliant much of the time with lifestyle modifications to include activity and meal planning.       Current Outpatient Prescriptions:     ADVAIR DISKUS 250-50 mcg/dose diskus inhaler, INHALE 1 PUFF INTO THE LUNGS 2 (TWO) TIMES DAILY *RINSE MOUTH AFTER EACH USE*, Disp: 60 each, Rfl: 11     "albuterol 90 mcg/actuation inhaler, Inhale 2 puffs into the lungs every 4 (four) hours as needed., Disp: , Rfl:     alprazolam (XANAX) 1 MG tablet, Take 1 mg by mouth 3 (three) times daily as needed., Disp: , Rfl:     aripiprazole (ABILIFY) 30 MG Tab, Take 30 mg by mouth nightly., Disp: , Rfl: 0    aspirin (ECOTRIN) 81 MG EC tablet, Take 81 mg by mouth once daily., Disp: , Rfl:     BD INSULIN PEN NEEDLE UF MINI 31 gauge x 3/16" Ndle, USE TO TEST TWICE A DAY, Disp: , Rfl: 7    BD INSULIN SYRINGE ULTRA-FINE 1/2 mL 30 gauge x 1/2" Syrg, , Disp: , Rfl: 0    blood sugar diagnostic (TRUE METRIX GLUCOSE TEST STRIP) Strp, 1 strip by Misc.(Non-Drug; Combo Route) route 3 (three) times daily. True Metrix Strips, Disp: 100 strip, Rfl: 11    blood-glucose meter (TRUE METRIX AIR GLUCOSE METER) kit, Use as instructed- True Metrix Meter, Disp: 1 each, Rfl: 0    cyclobenzaprine (FLEXERIL) 10 MG tablet, TAKE 1/2 BY MOUTH TWICE A DAY AND TAKE 1 TABLET EVERY EVENING, Disp: , Rfl: 1    duloxetine (CYMBALTA) 60 MG capsule, Take 60 mg by mouth 2 (two) times daily., Disp: , Rfl: 2    ergocalciferol (ERGOCALCIFEROL) 50,000 unit Cap, Take 1 capsule (50,000 Units total) by mouth every 7 days., Disp: 10 capsule, Rfl: 0    fenofibrate (TRICOR) 48 MG tablet, Take 1 tablet (48 mg total) by mouth once daily., Disp: 90 tablet, Rfl: 1    fluticasone (FLONASE) 50 mcg/actuation nasal spray, USE 1 SRPAY IN EACH NOSTRIL ONCE DAILY, Disp: 1 Bottle, Rfl: 1    gabapentin (NEURONTIN) 300 MG capsule, TAKE ONE CAPSULE BY MOUTH 3 TIMES PER DAY RTS 8.8.2017, Disp: 90 capsule, Rfl: 0    HUMALOG 100 unit/mL injection, INJECT 18 UNITS UNDER THE SKIN WITH MEALS AS DIRECTED, Disp: 20 mL, Rfl: 1    hydrALAZINE (APRESOLINE) 10 MG tablet, TAKE 1 TABLET BY MOUTH 3 TIMES DAILY., Disp: 90 tablet, Rfl: 6    lancets (TRUEPLUS LANCETS) 33 gauge Misc, 1 lancet by Misc.(Non-Drug; Combo Route) route 3 (three) times daily., Disp: 100 each, Rfl: 11    LINZESS 145 " mcg Cap capsule, Take 145 mcg by mouth as needed. , Disp: , Rfl: 3    losartan-hydrochlorothiazide 100-25 mg (HYZAAR) 100-25 mg per tablet, Take 1 tablet by mouth once daily., Disp: 90 tablet, Rfl: 3    metFORMIN (GLUCOPHAGE) 1000 MG tablet, TAKE 1 TABLET (1,000 MG TOTAL) BY MOUTH 2 (TWO) TIMES DAILY., Disp: 180 tablet, Rfl: 0    metoprolol succinate (TOPROL-XL) 50 MG 24 hr tablet, Take 1 tablet (50 mg total) by mouth once daily., Disp: 30 tablet, Rfl: 11    mirtazapine (REMERON) 30 MG tablet, Take 30 mg by mouth nightly., Disp: , Rfl: 2    MULTIVIT,CALC,MINS/IRON/FOLIC (DAILY MULTIPLE FOR WOMEN ORAL), Take 1 tablet by mouth once daily., Disp: , Rfl:     pravastatin (PRAVACHOL) 80 MG tablet, TAKE 1 TABLET BY MOUTH ONCE DAILY, Disp: 30 tablet, Rfl: 4    promethazine (PHENERGAN) 25 MG tablet, Take 1 tablet (25 mg total) by mouth every 4 to 6 hours as needed for Nausea. sedating, Disp: 20 tablet, Rfl: 0    rizatriptan (MAXALT) 10 MG tablet, Take 1 tablet (10 mg total) by mouth once as needed for Migraine., Disp: 6 tablet, Rfl: 0    sodium,potassium,mag sulfates (SUPREP BOWEL PREP KIT) 17.5-3.13-1.6 gram SolR, As directed, Disp: 354 mL, Rfl: 0    TOUJEO SOLOSTAR 300 unit/mL (1.5 mL) InPn pen, INJECT 50 UNITS INTO THE SKIN EVERY EVENING., Disp: 13.5 Syringe, Rfl: 1    verapamil (VERELAN) 180 MG C24P, TAKE 1 CAPSULE (180 MG TOTAL) BY MOUTH ONCE DAILY., Disp: 30 capsule, Rfl: 3    zolpidem (AMBIEN) 10 mg Tab, Take 10 mg by mouth nightly as needed., Disp: , Rfl:     Current Facility-Administered Medications:     DIPH,PERTUSS(ACEL),TET VAC(PF)(ADULT)(ADACEL)(TDaP), 0.5 mL, Intramuscular, Once, Paxton Sampson PA-C    Past Medical History:   Diagnosis Date    Anemia     Anxiety     Asthma 10/11/2016    Bipolar 1 disorder     Diabetes mellitus, type 2     GERD (gastroesophageal reflux disease)        Family History   Problem Relation Age of Onset    Diabetes Mother     Hyperlipidemia Mother      Hypertension Mother     Asthma Mother     COPD Mother     Glaucoma Mother     Thyroid disease Mother     Hypertension Father     Hyperlipidemia Father     Cancer Father      Brain, lung, liver, kidney    Heart disease Maternal Grandmother     Hyperlipidemia Maternal Grandmother     Hypertension Maternal Grandmother     Cataracts Maternal Grandmother     Diabetes Maternal Grandmother     Heart disease Maternal Grandfather     Hyperlipidemia Maternal Grandfather     Hypertension Maternal Grandfather     Glaucoma Maternal Grandfather     Cancer Maternal Grandfather     Cataracts Maternal Grandfather     Macular degeneration Maternal Grandfather     Diabetes Maternal Grandfather     Heart disease Paternal Grandmother     Hyperlipidemia Paternal Grandmother     Hypertension Paternal Grandmother     Cataracts Paternal Grandmother     Heart disease Paternal Grandfather     Hyperlipidemia Paternal Grandfather     Hypertension Paternal Grandfather     Cataracts Paternal Grandfather        Social History     Social History    Marital status: Single     Spouse name: N/A    Number of children: N/A    Years of education: N/A     Occupational History    Not on file.     Social History Main Topics    Smoking status: Never Smoker    Smokeless tobacco: Never Used    Alcohol use 0.0 oz/week      Comment: socially    Drug use: No    Sexual activity: Not on file     Other Topics Concern    Not on file     Social History Narrative    Long-term care nurse         STANDARDS OF CARE:  Eye doctor: Dr. Holland, last exam 3/15/2017.  Dental exam: Recommend regular exams; denies gums bleeding.  Podiatry doctor:  ACE/ARB: Yes  Statin: Yes    ACTIVITY LEVEL: She exercises never.  BLOOD GLUCOSE TESTING: Self-monitoring with   SOCIAL HISTORY: single. Lives with family. 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   HgA1C Testing and Control   Lab Results   Component Value Date    HGBA1C 8.3 (H) 12/22/2017      Annually/Less than 8% No   Lipid profile : 12/22/2017 Annually Yes   LDL control Lab Results   Component Value Date    LDLCALC 33.6 (L) 12/22/2017    Annually/Less than 100 mg/dl  Yes   Nephropathy screening Lab Results   Component Value Date    LABMICR 20.0 12/22/2017     Lab Results   Component Value Date    PROTEINUA Negative 08/26/2017    Annually Yes   Blood pressure BP Readings from Last 1 Encounters:   12/21/17 (!) 124/92    Less than 140/90 No   Dilated retinal exam : 03/15/2017 Annually Yes   Foot exam   : 12/21/2017 Annually Yes       The following results were reviewed with patient.    Lab Results   Component Value Date    WBC 7.29 12/22/2017    HGB 11.5 (L) 12/22/2017    HCT 36.5 (L) 12/22/2017     12/22/2017    CHOL 102 (L) 12/22/2017    TRIG 127 12/22/2017    HDL 43 12/22/2017    ALT 62 (H) 12/22/2017    AST 45 (H) 12/22/2017     12/22/2017    K 3.9 12/22/2017     12/22/2017    CREATININE 0.7 12/22/2017    ESTGFRAFRICA >60 12/22/2017    EGFRNONAA >60 12/22/2017    BUN 8 12/22/2017    CO2 27 12/22/2017    TSH 1.855 12/22/2017    INR 0.9 11/23/2016     (H) 12/22/2017       Lab Results   Component Value Date    HGBA1C 8.3 (H) 12/22/2017    HGBA1C 8.5 (H) 01/31/2017    HGBA1C 6.6 (H) 03/22/2016     Lab Results   Component Value Date    FREET4 0.77 03/15/2017     Lab Results   Component Value Date    TSH 1.855 12/22/2017     Lab Results   Component Value Date    IRON 39 03/31/2017    TIBC 496 (H) 03/31/2017    FERRITIN 35 03/31/2017     Lab Results   Component  Value Date    SAYYZKUG09 426 03/31/2017     Lab Results   Component Value Date    CALCIUM 9.5 12/22/2017       Review of patient's allergies indicates:   Allergen Reactions    Lortab [hydrocodone-acetaminophen] Itching    Neuromuscular blockers, steroidal      some    Latex, natural rubber Rash    Morphine Rash     itching    Seconal [secobarbital sodium] Rash     itching    Tylox [oxycodone-acetaminophen] Rash       Past Medical History:   Diagnosis Date    Anemia     Anxiety     Asthma 10/11/2016    Bipolar 1 disorder     Diabetes mellitus, type 2     GERD (gastroesophageal reflux disease)        Review of Systems   Constitutional: Negative.  Negative for activity change, appetite change, chills, diaphoresis, fatigue, fever and unexpected weight change.   HENT: Negative.  Negative for congestion, dental problem, drooling, ear discharge, ear pain, facial swelling, hearing loss, mouth sores, nosebleeds, postnasal drip, rhinorrhea, sinus pressure, sneezing, sore throat, tinnitus, trouble swallowing and voice change.    Eyes: Negative.  Negative for photophobia, pain, discharge, redness, itching and visual disturbance.   Respiratory: Negative.  Negative for apnea, cough, choking, chest tightness, shortness of breath, wheezing and stridor.    Cardiovascular: Negative.  Negative for chest pain, palpitations and leg swelling.   Gastrointestinal: Negative.  Negative for abdominal distention, abdominal pain, anal bleeding, blood in stool, constipation, diarrhea, nausea, rectal pain and vomiting.   Endocrine: Negative.  Negative for cold intolerance, heat intolerance, polydipsia, polyphagia and polyuria.   Genitourinary: Negative.  Negative for decreased urine volume, difficulty urinating, dyspareunia, dysuria, enuresis, flank pain, frequency, genital sores, hematuria, menstrual problem, pelvic pain, urgency, vaginal bleeding, vaginal discharge and vaginal pain.   Musculoskeletal: Negative.  Negative for  "arthralgias, back pain, gait problem, joint swelling, myalgias, neck pain and neck stiffness.   Skin: Negative.  Negative for color change, pallor, rash and wound.   Allergic/Immunologic: Negative.  Negative for environmental allergies, food allergies and immunocompromised state.   Neurological: Negative.  Negative for dizziness, tremors, seizures, syncope, facial asymmetry, speech difficulty, weakness, light-headedness, numbness and headaches.   Hematological: Negative.  Negative for adenopathy. Does not bruise/bleed easily.   Psychiatric/Behavioral: Negative.  Negative for agitation, behavioral problems, confusion, decreased concentration, dysphoric mood, hallucinations, self-injury, sleep disturbance and suicidal ideas. The patient is not nervous/anxious and is not hyperactive.           Objective:   Last 3 sets of Vitals:   Vitals - 1 value per visit 2/26/2018 2/27/2018 2/28/2018   SYSTOLIC 128 165 143   DIASTOLIC 90 84 92   PULSE 75 102 87   TEMPERATURE 97.5 98.4 98.9   RESPIRATIONS 18 18 20   SPO2 97 9 96   Weight (lb) 221.23 220 218   Weight (kg) 100.35 99.791 98.884   HEIGHT 4' 8" 4' 8" 4' 8"   BODY MASS INDEX 49.6 49.32 48.87   VISIT REPORT - - -   Pain Score  8 - -        Physical Exam   Constitutional: She is oriented to person, place, and time. She appears well-developed and well-nourished. She is cooperative.  Non-toxic appearance. She does not have a sickly appearance. She does not appear ill. No distress. She is not intubated.   HENT:   Head: Normocephalic and atraumatic. Not macrocephalic and not microcephalic. Head is without raccoon's eyes, without Newton's sign, without abrasion, without contusion, without laceration, without right periorbital erythema and without left periorbital erythema. Hair is normal.   Right Ear: Hearing, tympanic membrane, external ear and ear canal normal. No lacerations. No drainage, swelling or tenderness. No foreign bodies. No mastoid tenderness. Tympanic membrane is " not injected, not scarred, not perforated, not erythematous, not retracted and not bulging. Tympanic membrane mobility is normal. No middle ear effusion. No hemotympanum. No decreased hearing is noted.   Left Ear: Hearing, tympanic membrane, external ear and ear canal normal. No lacerations. No drainage, swelling or tenderness. No foreign bodies. No mastoid tenderness. Tympanic membrane is not injected, not scarred, not perforated, not erythematous, not retracted and not bulging. Tympanic membrane mobility is normal.  No middle ear effusion. No hemotympanum. No decreased hearing is noted.   Nose: Nose normal. No mucosal edema, rhinorrhea, nose lacerations, sinus tenderness, nasal deformity, septal deviation or nasal septal hematoma. No epistaxis.  No foreign bodies. Right sinus exhibits no maxillary sinus tenderness and no frontal sinus tenderness. Left sinus exhibits no maxillary sinus tenderness and no frontal sinus tenderness.   Mouth/Throat: Oropharynx is clear and moist. No oropharyngeal exudate.   Eyes: Conjunctivae and EOM are normal. Pupils are equal, round, and reactive to light. Right eye exhibits no chemosis, no discharge and no exudate. No foreign body present in the right eye. Left eye exhibits no chemosis, no discharge, no exudate and no hordeolum. No foreign body present in the left eye. Right conjunctiva is not injected. Right conjunctiva has no hemorrhage. Left conjunctiva is not injected. Left conjunctiva has no hemorrhage. No scleral icterus. Right eye exhibits normal extraocular motion and no nystagmus. Left eye exhibits normal extraocular motion and no nystagmus. Right pupil is round and reactive. Left pupil is round and reactive. Pupils are equal.   Neck: Trachea normal, normal range of motion and full passive range of motion without pain. Neck supple. Normal carotid pulses, no hepatojugular reflux and no JVD present. No tracheal tenderness, no spinous process tenderness and no muscular  tenderness present. Carotid bruit is not present. No neck rigidity. No tracheal deviation, no edema, no erythema and normal range of motion present. No thyroid mass and no thyromegaly present.   Cardiovascular: Normal rate, regular rhythm, normal heart sounds and intact distal pulses.   No extrasystoles are present. PMI is not displaced.  Exam reveals no gallop, no friction rub and no decreased pulses.    No murmur heard.  Pulses:       Dorsalis pedis pulses are 2+ on the right side, and 2+ on the left side.        Posterior tibial pulses are 2+ on the right side, and 2+ on the left side.   Pulmonary/Chest: Effort normal and breath sounds normal. No accessory muscle usage or stridor. No apnea, no tachypnea and no bradypnea. She is not intubated. No respiratory distress. She has no decreased breath sounds. She has no wheezes. She has no rhonchi. She has no rales. Chest wall is not dull to percussion. She exhibits no mass, no tenderness, no bony tenderness, no laceration, no crepitus, no edema, no deformity, no swelling and no retraction.   Abdominal: Soft. Normal appearance and bowel sounds are normal. She exhibits no shifting dullness, no distension, no pulsatile liver, no fluid wave, no abdominal bruit, no ascites, no pulsatile midline mass and no mass. There is no hepatosplenomegaly, splenomegaly or hepatomegaly. There is no tenderness. There is no rigidity, no rebound, no guarding, no CVA tenderness, no tenderness at McBurney's point and negative Dalton's sign.   Musculoskeletal: Normal range of motion. She exhibits no edema or tenderness.        Right foot: There is normal range of motion and no deformity.        Left foot: There is normal range of motion and no deformity.   Feet:   Right Foot:   Protective Sensation: 5 sites tested. 5 sites sensed.   Skin Integrity: Negative for ulcer, blister, skin breakdown, erythema, warmth, callus or dry skin.   Left Foot:   Protective Sensation: 5 sites tested. 5 sites  sensed.   Skin Integrity: Negative for ulcer, blister, skin breakdown, erythema, warmth, callus or dry skin.   Lymphadenopathy:        Head (right side): No submental, no submandibular, no tonsillar, no preauricular, no posterior auricular and no occipital adenopathy present.        Head (left side): No submental, no submandibular, no tonsillar, no preauricular, no posterior auricular and no occipital adenopathy present.     She has no cervical adenopathy.        Right cervical: No superficial cervical, no deep cervical and no posterior cervical adenopathy present.       Left cervical: No superficial cervical, no deep cervical and no posterior cervical adenopathy present.     She has no axillary adenopathy.   Neurological: She is alert and oriented to person, place, and time. She has normal reflexes. She is not disoriented. She displays no atrophy, no tremor and normal reflexes. No cranial nerve deficit or sensory deficit. She exhibits normal muscle tone. She displays no seizure activity. Coordination and gait normal.   Reflex Scores:       Bicep reflexes are 2+ on the right side and 2+ on the left side.       Brachioradialis reflexes are 2+ on the right side and 2+ on the left side.       Patellar reflexes are 2+ on the right side and 2+ on the left side.  Skin: Skin is warm and dry. No abrasion, no bruising, no burn, no ecchymosis, no laceration, no lesion, no petechiae, no purpura and no rash noted. Rash is not macular, not papular, not maculopapular, not nodular, not pustular, not vesicular and not urticarial. She is not diaphoretic. No cyanosis or erythema. No pallor. Nails show no clubbing.   Psychiatric: She has a normal mood and affect. Her behavior is normal. Judgment and thought content normal. Her mood appears not anxious. Her affect is not angry, not blunt and not labile. Her speech is not rapid and/or pressured, not delayed, not tangential and not slurred. She is not agitated, not aggressive, not  "hyperactive, not slowed, not withdrawn, not actively hallucinating and not combative. Thought content is not paranoid and not delusional. Cognition and memory are not impaired. She does not express impulsivity or inappropriate judgment. She does not exhibit a depressed mood. She expresses no homicidal and no suicidal ideation. She expresses no suicidal plans and no homicidal plans. She is communicative. She exhibits normal recent memory and normal remote memory. She is attentive.   Nursing note and vitals reviewed.          Assessment:     EDUCATIONAL ASSESSMENT:  1. Impediments in learning class environment - NONE.  2. Needs improvement in self-care management skills.    1. Type 2 diabetes mellitus with diabetic polyneuropathy, with long-term current use of insulin      Plan:   Yolanda Betts is seen today for   1. Type 2 diabetes mellitus with diabetic polyneuropathy, with long-term current use of insulin    2. Chronic pain disorder    3. NAFLD (nonalcoholic fatty liver disease)      We have discussed the etiology and treatment options associated with the diagnosis as well as alternatives. She has elected the following treatments.     Type 2 diabetes mellitus with diabetic polyneuropathy, with long-term current use of insulin  -     POCT glucose  -     Anti-islet cell antibody; Future; Expected date: 02/26/2018  -     Glutamic acid decarboxylase; Future; Expected date: 02/26/2018  -     C-peptide; Future; Expected date: 02/26/2018  -     PTH, intact; Future; Expected date: 02/26/2018  -     Calcium, ionized; Future; Expected date: 02/26/2018  -     insulin syringe,safetyneedle 1 mL 31 gauge x 5/16" Syrg; 1 Syringe by Misc.(Non-Drug; Combo Route) route 3 (three) times daily before meals.  Dispense: 120 Syringe; Refill: 11    Chronic pain disorder  -     PTH, intact; Future; Expected date: 02/26/2018  -     Calcium, ionized; Future; Expected date: 02/26/2018    NAFLD (nonalcoholic fatty liver disease)  -     POCT " glucose  -     Anti-islet cell antibody; Future; Expected date: 02/26/2018  -     Glutamic acid decarboxylase; Future; Expected date: 02/26/2018  -     C-peptide; Future; Expected date: 02/26/2018  -     PTH, intact; Future; Expected date: 02/26/2018  -     Calcium, ionized; Future; Expected date: 02/26/2018      1.) Patient was instructed to monitor blood glucose twice daily, fasting, post meal and ac dinner or at bedtime. Reminded to bring BG records or meter to each visit for review.  2.) Reviewed pathophysiology of type 2 diabetes, complications related to the disease, importance of annual dilated eye exam and self daily foot examination.  3.) Continue medications as prescribed MDI with Toujeo and Humalog; Metformin . [facility] MyChart or Phone review in 1 week with BG records for adjustment of medication.  4.) Refer patient to Dietician/CDE for ongoing diabetes education, meal planning, carbohydrate counting, and diabetes support.  5.) Discussed activity, benefits, methods, and precautions. Recommended patient start/continue some form of exercise and increase as tolerated to 60 minutes per day to facilitate weight loss and aid in control of BGs. Also reminded patient of WHO recommendation of 10,000 steps daily as a goal.   6.) A1C, TSH, Lipid Panel, CMP with eGFR and Micro/Creatinine.  7.) Return to clinic in 6 months for follow up. Advised patient to call clinic with any questions or concerns.    A total of 50 minutes was spent in face to face time, of which 50 % was spent in counseling patient on disease process, complications, treatment, and side effects of medications.    The patient was explained the above plan and given opportunity to ask questions.  She understands, chooses and consents to this plan and accepts all the risks, which include but are not limited to the risks mentioned above.   She understands the alternative of having no testing, interventions or treatments at this time. She left content and  without further questions.     Disclaimer:  This note is prepared using voice recognition software and as such is likely to have errors and has not been proof read. Please contact me for questions.

## 2018-02-26 NOTE — LETTER
February 26, 2018      Paxton Sampson PA-C  [address] [facility] [place] [address]           [facility] - Diabetes Management  [address] [place] [place] [address]  Phone: [phone]  Fax: [phone]          Patient: Yolanda Betts   MR Number: [id number]   YOB: 1970   Date of Visit: 2/26/2018       Dear Paxton Sampson:    Thank you for referring Yolanda Betts to me for evaluation. Attached you will find relevant portions of my assessment and plan of care.    If you have questions, please do not hesitate to call me. I look forward to following Yolanda Betts along with you.    Sincerely,    Millie Pruitt Jr., MARJORIE    Enclosure  CC:  No Recipients    If you would like to receive this communication electronically, please contact [email or web address] or [phone] to request more information on [employer] access.    For providers and/or their staff who would like to refer a patient to [facility], please contact us through our one-stop-shop provider referral line, [facility], at [phone].    If you feel you have received this communication in error or would no longer like to receive these types of communications, please e-mail [email or web address]

## 2018-02-26 NOTE — PATIENT INSTRUCTIONS
" I have reviewed your results and they are still quite high. I would like you to start "Treating to Target". The treatment will be Insulin and your target will be the Fasting and 2 hour post meal blood sugar. It will work in this manner;    1. Goal for Fasting blood sugar is  mg/dl. I realize that you will need time to adjust to the new levels and presently you may feel too low if you are too aggressive now. So go slow and aim to lower your blood sugar to below 200 then 150 then 100 over several months.    2. Goal for 2 hour post meal blood sugar is below 180 mg/dl, here the same rules apply as in #1.    3. You will check your fasting blood sugar daily, if not where we would like it to be over a 3 day period then that evening we will increase the Toujeo dose by 5 units. Then repeat the process over the next 3 days. Remember this is a slow process and take our time getting to goal. But, each week should be better than prior weeks. Blood sugars below 70 are unacceptable and should raise a "RED FLAG" where we may have to reduce our dose of insulin.    4. You will check your post meal glucose daily as well. However, each day you will check a different meal, (ie. Monday-breakfast; Tuesday- lunch; Wednesday- supper, then repeat). If your post meal glucose is not where we would like, increase pre-meal insulin by 2 units next time. A word of CAUTION: mealtime insulin is dependant on the size and concentration of your meal content. If not consuming a large meal do not take large dose of insulin. Use the reasonable person rule.     5. If you have any questions please do not hesitate to call.    Intensive insulin Therapy with correction factor:    You are on Intensive insulin therapy with Basal and Bolus insulin. Lantus, Levamir or NPH is your Basal insulin and will help maintain your fasting and between meal sugar. Your fast acting or rescue insulin is either Humalog, Novalog or Regular insulin and will control your " "post meal sugar.     You will Take 55 units of Toujeo at 9 pm each night. This will be adjusted up or down depending on your fasting blood sugar before breakfast.    Humalog will follow this pre meal schedule; Correction factor of "2 units per 50 mg/dl" and is based on 30-60 grams of carbohydrates per meal.    If blood sugar is below 70 eat first then check your blood sugar 2 hours later and make correction.  If blood pre-meal sugar is  70 -150 take 20 units of Humalog;  If blood pre-meal sugar is 151-200 take +2 units of Humalog;  If blood pre-meal sugar is 201-250 take +4 units of Humalog;  If blood pre-meal sugar is 251-300 take +6 units of Humalog;  If blood pre-meal sugar is 301-350 take +8 units of Humalog;  If blood pre-meal sugar is 351-400+ take +10 units of Humalog;  Also increase water intake and call for appointment.  "

## 2018-02-26 NOTE — TELEPHONE ENCOUNTER
----- Message from Jessica uMstafa sent at 2/26/2018  8:30 AM CST -----  Contact: pt  She's calling stating that her colonoscopy that is scheduled for tomorrow needs a prior authorization, please advise [phone] (home)

## 2018-02-26 NOTE — TELEPHONE ENCOUNTER
----- Message from Denise Cintron sent at 2/26/2018 10:25 AM CST -----  Contact: pt  Returning a call.

## 2018-02-26 NOTE — PROGRESS NOTES
"Subjective:       Patient ID: Yolanda Betts is a 47 y.o. female.    Chief Complaint: Back Pain    Back Pain   This is a new problem. The current episode started in the past 7 days (has had it for 1 week, woke up with it hurting). The problem occurs constantly. The problem is unchanged (not improving). Pain location: upper back on the right side. Quality: "sharp pain" The pain does not radiate. The pain is severe. The pain is the same all the time. The symptoms are aggravated by bending and twisting (deep breath in causes increased pain). Pertinent negatives include no abdominal pain, bladder incontinence, bowel incontinence, chest pain, dysuria, fever, headaches, leg pain, numbness, pelvic pain, tingling or weakness. (No dysuria or other urinary symptoms, no cough, fever, no recent URI) Risk factors include obesity. She has tried nothing (took her usual gabapentin without improvement in symptoms) for the symptoms. The treatment provided no relief.     Review of Systems   Constitutional: Negative for chills, fatigue and fever.   HENT: Negative for congestion, ear discharge, ear pain, postnasal drip, rhinorrhea, sinus pressure, sneezing and sore throat.    Eyes: Negative for pain and discharge.   Respiratory: Negative for cough, shortness of breath and wheezing.    Cardiovascular: Negative for chest pain and leg swelling.   Gastrointestinal: Negative for abdominal pain, bowel incontinence, nausea and vomiting.   Genitourinary: Negative for bladder incontinence, dysuria and pelvic pain.   Musculoskeletal: Positive for back pain. Negative for myalgias.   Skin: Negative for rash.   Neurological: Negative for tingling, weakness, numbness and headaches.       Objective:      BP (!) 128/90 (BP Location: Right arm, Patient Position: Sitting, BP Method: Large (Manual))   Pulse 75   Temp 97.5 °F (36.4 °C) (Tympanic)   Resp 18   Ht 4' 8" (1.422 m)   Wt 100.4 kg (221 lb 3.7 oz)   SpO2 97%   BMI 49.60 kg/m² "   Physical Exam   Musculoskeletal:        Thoracic back: She exhibits decreased range of motion (cannot fully twist secondary to increased pain), pain and spasm. She exhibits no bony tenderness and no edema.        Back:        Assessment:       1. Muscle strain of chest wall, initial encounter        Plan:       Muscle strain of chest wall, initial encounter  -     ketorolac injection 60 mg; Inject 2 mLs (60 mg total) into the muscle one time.  -     cyclobenzaprine (FLEXERIL) 10 MG tablet; Take 1 tablet (10 mg total) by mouth 3 (three) times daily as needed for Muscle spasms.  Dispense: 30 tablet; Refill: 0  -     naproxen (NAPROSYN) 500 MG tablet; Take 1 tablet (500 mg total) by mouth every 12 (twelve) hours as needed (pain).  Dispense: 20 tablet; Refill: 0    Likely chest wall pain over right paraspinous muscles, no  or pulmonary symptoms. Exam reassuring. Start NSAID's, heat pack, flexeril, rest. RTC if not improving in 1-2 weeks.    Rest  Ice pack or heating pad, may alternate ice and heat  Naprosyn for pain as needed. Start this tomorrow since you had Toradol injection today.  Flexeril as needed for muscle spasms.     If symptoms worsen or fail to improve, follow-up with primary care doctor or nearest ER.       Heather Trant PA-C [facility]

## 2018-02-26 NOTE — TELEPHONE ENCOUNTER
Pt stated she needs a prior authorization completed for her bowel prep prescription.  Colonoscopy is scheduled for 2/27/18.  Please contact pt at [phone].

## 2018-02-27 ENCOUNTER — SURGERY (OUTPATIENT)
Age: 48
End: 2018-02-27

## 2018-02-27 ENCOUNTER — ANESTHESIA (OUTPATIENT)
Dept: ENDOSCOPY | Facility: HOSPITAL | Age: 48
End: 2018-02-27
Payer: MEDICAID

## 2018-02-27 ENCOUNTER — HOSPITAL ENCOUNTER (OUTPATIENT)
Facility: HOSPITAL | Age: 48
Discharge: HOME | End: 2018-02-27
Attending: INTERNAL MEDICINE | Admitting: INTERNAL MEDICINE
Payer: MEDICAID

## 2018-02-27 VITALS
TEMPERATURE: 98 F | BODY MASS INDEX: 49.49 KG/M2 | RESPIRATION RATE: 18 BRPM | DIASTOLIC BLOOD PRESSURE: 84 MMHG | WEIGHT: 220 LBS | HEART RATE: 102 BPM | OXYGEN SATURATION: 9 % | HEIGHT: 56 IN | SYSTOLIC BLOOD PRESSURE: 165 MMHG

## 2018-02-27 DIAGNOSIS — D50.9 IRON DEFICIENCY ANEMIA: ICD-10-CM

## 2018-02-27 LAB — POCT GLUCOSE: 169 MG/DL (ref 70–110)

## 2018-02-27 PROCEDURE — 63600175 PHARM REV CODE 636 W HCPCS: Performed by: NURSE ANESTHETIST, CERTIFIED REGISTERED

## 2018-02-27 PROCEDURE — 43239 EGD BIOPSY SINGLE/MULTIPLE: CPT | Performed by: INTERNAL MEDICINE

## 2018-02-27 PROCEDURE — 88305 TISSUE EXAM BY PATHOLOGIST: CPT | Performed by: PATHOLOGY

## 2018-02-27 PROCEDURE — 25000003 PHARM REV CODE 250: Performed by: NURSE ANESTHETIST, CERTIFIED REGISTERED

## 2018-02-27 PROCEDURE — 00813 ANES UPR LWR GI NDSC PX: CPT | Performed by: INTERNAL MEDICINE

## 2018-02-27 PROCEDURE — 37000008 HC ANESTHESIA 1ST 15 MINUTES: Performed by: INTERNAL MEDICINE

## 2018-02-27 PROCEDURE — 45378 DIAGNOSTIC COLONOSCOPY: CPT | Mod: 74 | Performed by: INTERNAL MEDICINE

## 2018-02-27 PROCEDURE — 37000009 HC ANESTHESIA EA ADD 15 MINS: Performed by: INTERNAL MEDICINE

## 2018-02-27 PROCEDURE — 88305 TISSUE EXAM BY PATHOLOGIST: CPT | Mod: 26,,, | Performed by: PATHOLOGY

## 2018-02-27 PROCEDURE — 82962 GLUCOSE BLOOD TEST: CPT | Performed by: INTERNAL MEDICINE

## 2018-02-27 PROCEDURE — 45378 DIAGNOSTIC COLONOSCOPY: CPT | Mod: 52,,, | Performed by: INTERNAL MEDICINE

## 2018-02-27 PROCEDURE — 25000003 PHARM REV CODE 250: Performed by: INTERNAL MEDICINE

## 2018-02-27 PROCEDURE — 27201012 HC FORCEPS, HOT/COLD, DISP: Performed by: INTERNAL MEDICINE

## 2018-02-27 PROCEDURE — G0105 COLORECTAL SCRN; HI RISK IND: HCPCS | Performed by: INTERNAL MEDICINE

## 2018-02-27 PROCEDURE — 43239 EGD BIOPSY SINGLE/MULTIPLE: CPT | Mod: 51,,, | Performed by: INTERNAL MEDICINE

## 2018-02-27 RX ORDER — SODIUM CHLORIDE, SODIUM LACTATE, POTASSIUM CHLORIDE, CALCIUM CHLORIDE 600; 310; 30; 20 MG/100ML; MG/100ML; MG/100ML; MG/100ML
INJECTION, SOLUTION INTRAVENOUS CONTINUOUS PRN
Status: DISCONTINUED | OUTPATIENT
Start: 2018-02-27 | End: 2018-02-27

## 2018-02-27 RX ORDER — PROPOFOL 10 MG/ML
INJECTION, EMULSION INTRAVENOUS
Status: DISCONTINUED | OUTPATIENT
Start: 2018-02-27 | End: 2018-02-27

## 2018-02-27 RX ORDER — LIDOCAINE HCL/PF 100 MG/5ML
SYRINGE (ML) INTRAVENOUS
Status: DISCONTINUED | OUTPATIENT
Start: 2018-02-27 | End: 2018-02-27

## 2018-02-27 RX ORDER — SODIUM CHLORIDE, SODIUM LACTATE, POTASSIUM CHLORIDE, CALCIUM CHLORIDE 600; 310; 30; 20 MG/100ML; MG/100ML; MG/100ML; MG/100ML
INJECTION, SOLUTION INTRAVENOUS CONTINUOUS
Status: DISCONTINUED | OUTPATIENT
Start: 2018-02-27 | End: 2018-02-27 | Stop reason: HOSPADM

## 2018-02-27 RX ADMIN — SODIUM CHLORIDE, SODIUM LACTATE, POTASSIUM CHLORIDE, AND CALCIUM CHLORIDE: 600; 310; 30; 20 INJECTION, SOLUTION INTRAVENOUS at 07:02

## 2018-02-27 RX ADMIN — PROPOFOL 30 MG: 10 INJECTION, EMULSION INTRAVENOUS at 07:02

## 2018-02-27 RX ADMIN — LIDOCAINE HYDROCHLORIDE 100 MG: 20 INJECTION, SOLUTION INTRAVENOUS at 07:02

## 2018-02-27 RX ADMIN — SODIUM CHLORIDE, SODIUM LACTATE, POTASSIUM CHLORIDE, AND CALCIUM CHLORIDE: .6; .31; .03; .02 INJECTION, SOLUTION INTRAVENOUS at 07:02

## 2018-02-27 RX ADMIN — PROPOFOL 140 MG: 10 INJECTION, EMULSION INTRAVENOUS at 07:02

## 2018-02-27 NOTE — ANESTHESIA POSTPROCEDURE EVALUATION
"Anesthesia Post Evaluation    Patient: Yolanda Betts    Procedure(s) Performed: Procedure(s) (LRB):  colonoscopy for iron deficiency anemia (N/A)  ESOPHAGOGASTRODUODENOSCOPY (EGD) (N/A)      Patient location during evaluation: PACU  Patient participation: Yes- Able to Participate  Level of consciousness: awake and alert and oriented  Post-procedure vital signs: reviewed and stable  Pain management: adequate  Airway patency: patent  PONV status at discharge: No PONV  Anesthetic complications: no      Cardiovascular status: blood pressure returned to baseline  Respiratory status: unassisted, room air and spontaneous ventilation  Hydration status: euvolemic  Follow-up not needed.        Visit Vitals  BP (!) 185/124 (BP Location: Left arm, Patient Position: Lying)   Pulse 102   Temp 36.9 °C (98.4 °F) (Oral)   Resp 13   Ht 4' 8" (1.422 m)   Wt 99.8 kg (220 lb)   SpO2 97%   Breastfeeding? No   BMI 49.32 kg/m²       Pain/Elif Score: Pain Assessment Performed: Yes (2/27/2018  6:57 AM)  Presence of Pain: complains of pain/discomfort (2/27/2018  6:57 AM)      "

## 2018-02-27 NOTE — ANESTHESIA PREPROCEDURE EVALUATION
02/27/2018  Yolanda Betts is a 47 y.o., female.    Anesthesia Evaluation    I have reviewed the Patient Summary Reports.    I have reviewed the Nursing Notes.   I have reviewed the Medications.     Review of Systems  Anesthesia Hx:  No problems with previous Anesthesia    Social:  Non-Smoker, Social Alcohol Use    Hematology/Oncology:  Hematology Normal   Oncology Normal     EENT/Dental:   chronic allergic rhinitis   Cardiovascular:   Hypertension, well controlled hyperlipidemia ECG has been reviewed. CONCLUSIONS     1 - Normal left ventricular systolic function (EF 60-65%).     2 - Normal left ventricular diastolic function.     3 - Normal right ventricular systolic function .   Normal sinus rhythm  Nonspecific T wave abnormality  Abnormal ECG  When compared with ECG of 07-DEC-2016 09:42,  Previous ECG has undetermined rhythm, needs review  Confirmed by ROBERT MCQUEEN, LEOLA ([id number]) on 8/30/2017 9:31:07 PM   Pulmonary:   Asthma moderate Shortness of breath Sleep Apnea, CPAP    Renal/:  Renal/ Normal     Hepatic/GI:   Bowel Prep. GERD, well controlled 0500 last drink of fluid.   Musculoskeletal:   Arthritis     Neurological:   Neuromuscular Disease, Headaches   Peripheral Neuropathy    Endocrine:   Diabetes, poorly controlled, type 2, using insulin    Dermatological:  Skin Normal    Psych:   Psychiatric History          Physical Exam  General:  Well nourished, Morbid Obesity    Airway/Jaw/Neck:  Airway Findings: Mallampati: III                Anesthesia Plan  Type of Anesthesia, risks & benefits discussed:  Anesthesia Type:  MAC  Patient's Preference:   Intra-op Monitoring Plan:   Intra-op Monitoring Plan Comments:   Post Op Pain Control Plan:   Post Op Pain Control Plan Comments:   Induction:   IV  Beta Blocker:  Patient is not currently on a Beta-Blocker (No further documentation required).       Informed  Consent: Patient understands risks and agrees with Anesthesia plan.  Questions answered. Anesthesia consent signed with patient.  ASA Score: 3     Day of Surgery Review of History & Physical: I have interviewed and examined the patient. I have reviewed the patient's H&P dated: 02/27/18. There are no significant changes.  H&P update referred to the provider.         Ready For Surgery From Anesthesia Perspective.

## 2018-02-27 NOTE — H&P
Short Stay Endoscopy History and Physical    PCP - Vish Stafford MD    Procedure - EGD and colonoscopy  ASA - II  Mallampati - per anesthesia  History of Anesthesia problems - no  Family history Anesthesia problems -  no     HPI:  This is a 47 y.o. female here for evaluation of :     EGD  Reflux - no  Dysphagia - no  Abdominal pain - no  Diarrhea - yes  Anemia - yes  GI bleeding - no  Other - no    Colonoscopy  Screening - yes  Father with colon cancer in his 60's (patient not sure)  History of polyps - no  Diarrhea - yes  Anemia - yes  Blood in stools - no  Abdominal pain - no  Other - no    ROS:  CONSTITUTIONAL: Denies weight change,  fatigue, fevers, chills, night sweats.  CARDIOVASCULAR: Denies chest pain, shortness of breath, orthopnea and edema.  RESPIRATORY: Denies cough, hemoptysis, dyspnea, and wheezing.  GI: See HPI.    Medical History:   Past Medical History:   Diagnosis Date    Anemia     Anxiety     Asthma 10/11/2016    Bipolar 1 disorder     Diabetes mellitus, type 2     GERD (gastroesophageal reflux disease)        Surgical History:   Past Surgical History:   Procedure Laterality Date    BELT ABDOMINOPLASTY      BREAST SURGERY Bilateral 1996    Reduction     SECTION      X 2    COLONOSCOPY      HYSTERECTOMY      MOUTH SURGERY  1996    TUBAL LIGATION         Family History:   Family History   Problem Relation Age of Onset    Diabetes Mother     Hyperlipidemia Mother     Hypertension Mother     Asthma Mother     COPD Mother     Glaucoma Mother     Thyroid disease Mother     Hypertension Father     Hyperlipidemia Father     Cancer Father      Brain, lung, liver, kidney    Heart disease Maternal Grandmother     Hyperlipidemia Maternal Grandmother     Hypertension Maternal Grandmother     Cataracts Maternal Grandmother     Diabetes Maternal Grandmother     Heart disease Maternal Grandfather     Hyperlipidemia Maternal Grandfather     Hypertension Maternal  "Grandfather     Glaucoma Maternal Grandfather     Cancer Maternal Grandfather     Cataracts Maternal Grandfather     Macular degeneration Maternal Grandfather     Diabetes Maternal Grandfather     Heart disease Paternal Grandmother     Hyperlipidemia Paternal Grandmother     Hypertension Paternal Grandmother     Cataracts Paternal Grandmother     Heart disease Paternal Grandfather     Hyperlipidemia Paternal Grandfather     Hypertension Paternal Grandfather     Cataracts Paternal Grandfather        Social History:   Social History   Substance Use Topics    Smoking status: Never Smoker    Smokeless tobacco: Never Used    Alcohol use 0.0 oz/week      Comment: socially       Allergies:   Review of patient's allergies indicates:   Allergen Reactions    Lortab [hydrocodone-acetaminophen] Itching    Neuromuscular blockers, steroidal      some    Latex, natural rubber Rash    Morphine Rash     itching    Seconal [secobarbital sodium] Rash     itching    Tylox [oxycodone-acetaminophen] Rash        Medications:   No current facility-administered medications on file prior to encounter.      Current Outpatient Prescriptions on File Prior to Encounter   Medication Sig Dispense Refill    BD INSULIN SYRINGE ULTRA-FINE 1/2 mL 30 gauge x 1/2" Syrg   0    LINZESS 145 mcg Cap capsule Take 145 mcg by mouth as needed.   3    aspirin (ECOTRIN) 81 MG EC tablet Take 81 mg by mouth once daily.      ergocalciferol (ERGOCALCIFEROL) 50,000 unit Cap Take 1 capsule (50,000 Units total) by mouth every 7 days. 10 capsule 0    promethazine (PHENERGAN) 25 MG tablet Take 1 tablet (25 mg total) by mouth every 4 to 6 hours as needed for Nausea. sedating 20 tablet 0    rizatriptan (MAXALT) 10 MG tablet Take 1 tablet (10 mg total) by mouth once as needed for Migraine. 6 tablet 0       Physical Exam:  Vital Signs:   Vitals:    02/27/18 0714   BP: (!) 163/100   Pulse: 98   Resp: 17   Temp: 98.5 °F (36.9 °C)     General " Appearance: Well appearing in no acute distress  ENT: OP clear  Chest: CTA B  CV: RRR, no m/r/g  Abd: s/nt/nd/nabs  Ext: no edema    Labs:Reviewed  Lab Results   Component Value Date    WBC 7.29 12/22/2017    HGB 11.5 (L) 12/22/2017    HCT 36.5 (L) 12/22/2017    MCV 86 12/22/2017     12/22/2017     Lab Results   Component Value Date    APTT 27.6 11/23/2016     No components found for: [id number]  Lab Results   Component Value Date    IRON 39 03/31/2017    TIBC 496 (H) 03/31/2017    FERRITIN 35 03/31/2017     Iron def anemia  Diarrhea  Screening        Plan:   I have explained the risks and benefits of upper endoscopy and colonoscopy to the patient including but not limited to bleeding, perforation, infection, and death. The patient wishes to proceed.

## 2018-02-27 NOTE — TRANSFER OF CARE
"Anesthesia Transfer of Care Note    Patient: Yolanda Betts    Procedure(s) Performed: Procedure(s) (LRB):  colonoscopy for iron deficiency anemia (N/A)  ESOPHAGOGASTRODUODENOSCOPY (EGD) (N/A)    Patient location: PACU    Anesthesia Type: MAC    Transport from OR: Transported from OR on room air with adequate spontaneous ventilation    Post pain: adequate analgesia    Post assessment: no apparent anesthetic complications    Post vital signs: stable    Level of consciousness: awake and alert    Nausea/Vomiting: no nausea/vomiting    Complications: none    Transfer of care protocol was followed      Last vitals:   Visit Vitals  BP (!) 185/124 (BP Location: Left arm, Patient Position: Lying)   Pulse 102   Temp 36.9 °C (98.4 °F) (Oral)   Resp 13   Ht 4' 8" (1.422 m)   Wt 99.8 kg (220 lb)   SpO2 97%   Breastfeeding? No   BMI 49.32 kg/m²     "

## 2018-02-27 NOTE — ANESTHESIA RELEASE NOTE
"Anesthesia Release from PACU Note    Patient: Yolanda Betts    Procedure(s) Performed: Procedure(s) (LRB):  colonoscopy for iron deficiency anemia (N/A)  ESOPHAGOGASTRODUODENOSCOPY (EGD) (N/A)    Anesthesia type: MAC    Post pain: Adequate analgesia    Post assessment: no apparent anesthetic complications, tolerated procedure well and no evidence of recall    Last Vitals:   Visit Vitals  BP (!) 185/124 (BP Location: Left arm, Patient Position: Lying)   Pulse 102   Temp 36.9 °C (98.4 °F) (Oral)   Resp 13   Ht 4' 8" (1.422 m)   Wt 99.8 kg (220 lb)   SpO2 97%   Breastfeeding? No   BMI 49.32 kg/m²       Post vital signs: stable    Level of consciousness: awake, alert  and oriented    Nausea/Vomiting: no nausea/no vomiting    Complications: none    Airway Patency: patent    Respiratory: unassisted, spontaneous ventilation, room air    Cardiovascular: stable    Hydration: euvolemic  "

## 2018-02-27 NOTE — PLAN OF CARE
DAUGHTERS AT BEDSIDE. DR CHAVEZ SPOKE TO PT AND DAUGHTERS. REPEAT COLONOSCOPY TOMORROW DUE TO POOR PREP. VERBALIZE UNDERSTANDING

## 2018-02-28 ENCOUNTER — ANESTHESIA (OUTPATIENT)
Dept: ENDOSCOPY | Facility: HOSPITAL | Age: 48
End: 2018-02-28
Payer: MEDICAID

## 2018-02-28 ENCOUNTER — ANESTHESIA EVENT (OUTPATIENT)
Dept: ENDOSCOPY | Facility: HOSPITAL | Age: 48
End: 2018-02-28
Payer: MEDICAID

## 2018-02-28 ENCOUNTER — SURGERY (OUTPATIENT)
Age: 48
End: 2018-02-28

## 2018-02-28 ENCOUNTER — HOSPITAL ENCOUNTER (OUTPATIENT)
Facility: HOSPITAL | Age: 48
Discharge: HOME | End: 2018-02-28
Attending: INTERNAL MEDICINE | Admitting: INTERNAL MEDICINE
Payer: MEDICAID

## 2018-02-28 VITALS
HEIGHT: 56 IN | RESPIRATION RATE: 20 BRPM | BODY MASS INDEX: 49.04 KG/M2 | WEIGHT: 218 LBS | DIASTOLIC BLOOD PRESSURE: 92 MMHG | SYSTOLIC BLOOD PRESSURE: 143 MMHG | TEMPERATURE: 99 F | HEART RATE: 87 BPM | OXYGEN SATURATION: 96 %

## 2018-02-28 DIAGNOSIS — Z12.11 SCREEN FOR COLON CANCER: ICD-10-CM

## 2018-02-28 LAB — POCT GLUCOSE: 135 MG/DL (ref 70–110)

## 2018-02-28 PROCEDURE — 88305 TISSUE EXAM BY PATHOLOGIST: CPT | Performed by: PATHOLOGY

## 2018-02-28 PROCEDURE — 25000003 PHARM REV CODE 250: Performed by: INTERNAL MEDICINE

## 2018-02-28 PROCEDURE — 45380 COLONOSCOPY AND BIOPSY: CPT | Mod: ,,, | Performed by: INTERNAL MEDICINE

## 2018-02-28 PROCEDURE — 00811 ANES LWR INTST NDSC NOS: CPT | Performed by: PATHOLOGY

## 2018-02-28 PROCEDURE — 88305 TISSUE EXAM BY PATHOLOGIST: CPT | Mod: 26,,, | Performed by: PATHOLOGY

## 2018-02-28 PROCEDURE — 63600175 PHARM REV CODE 636 W HCPCS: Performed by: NURSE ANESTHETIST, CERTIFIED REGISTERED

## 2018-02-28 PROCEDURE — 00811 ANES LWR INTST NDSC NOS: CPT | Performed by: INTERNAL MEDICINE

## 2018-02-28 PROCEDURE — 27201012 HC FORCEPS, HOT/COLD, DISP: Performed by: INTERNAL MEDICINE

## 2018-02-28 PROCEDURE — 45380 COLONOSCOPY AND BIOPSY: CPT | Performed by: INTERNAL MEDICINE

## 2018-02-28 PROCEDURE — 37000009 HC ANESTHESIA EA ADD 15 MINS: Performed by: INTERNAL MEDICINE

## 2018-02-28 PROCEDURE — 25000003 PHARM REV CODE 250: Performed by: NURSE ANESTHETIST, CERTIFIED REGISTERED

## 2018-02-28 PROCEDURE — 37000008 HC ANESTHESIA 1ST 15 MINUTES: Performed by: INTERNAL MEDICINE

## 2018-02-28 RX ORDER — SODIUM CHLORIDE, SODIUM LACTATE, POTASSIUM CHLORIDE, CALCIUM CHLORIDE 600; 310; 30; 20 MG/100ML; MG/100ML; MG/100ML; MG/100ML
INJECTION, SOLUTION INTRAVENOUS CONTINUOUS PRN
Status: DISCONTINUED | OUTPATIENT
Start: 2018-02-28 | End: 2018-02-28

## 2018-02-28 RX ORDER — SODIUM CHLORIDE, SODIUM LACTATE, POTASSIUM CHLORIDE, CALCIUM CHLORIDE 600; 310; 30; 20 MG/100ML; MG/100ML; MG/100ML; MG/100ML
INJECTION, SOLUTION INTRAVENOUS CONTINUOUS
Status: DISCONTINUED | OUTPATIENT
Start: 2018-02-28 | End: 2018-02-28 | Stop reason: HOSPADM

## 2018-02-28 RX ORDER — LIDOCAINE HCL/PF 100 MG/5ML
SYRINGE (ML) INTRAVENOUS
Status: DISCONTINUED | OUTPATIENT
Start: 2018-02-28 | End: 2018-02-28

## 2018-02-28 RX ORDER — PROPOFOL 10 MG/ML
VIAL (ML) INTRAVENOUS
Status: DISCONTINUED | OUTPATIENT
Start: 2018-02-28 | End: 2018-02-28

## 2018-02-28 RX ADMIN — SODIUM CHLORIDE, SODIUM LACTATE, POTASSIUM CHLORIDE, AND CALCIUM CHLORIDE: .6; .31; .03; .02 INJECTION, SOLUTION INTRAVENOUS at 11:02

## 2018-02-28 RX ADMIN — PROPOFOL 50 MG: 10 INJECTION, EMULSION INTRAVENOUS at 01:02

## 2018-02-28 RX ADMIN — SODIUM CHLORIDE, SODIUM LACTATE, POTASSIUM CHLORIDE, AND CALCIUM CHLORIDE: 600; 310; 30; 20 INJECTION, SOLUTION INTRAVENOUS at 12:02

## 2018-02-28 RX ADMIN — LIDOCAINE HYDROCHLORIDE 80 ML: 20 INJECTION, SOLUTION INTRAVENOUS at 01:02

## 2018-02-28 RX ADMIN — PROPOFOL 80 MG: 10 INJECTION, EMULSION INTRAVENOUS at 01:02

## 2018-02-28 NOTE — ANESTHESIA RELEASE NOTE
"Anesthesia Release from PACU Note    Patient: Yolanda Betts    Procedure(s) Performed: Procedure(s) (LRB):  COLONOSCOPY (N/A)    Anesthesia type: MAC    Post pain: Adequate analgesia    Post assessment: no apparent anesthetic complications and tolerated procedure well    Last Vitals:   Visit Vitals  /89 (BP Location: Left arm, Patient Position: Lying)   Pulse 83   Temp 37.2 °C (98.9 °F) (Oral)   Resp 20   Ht 4' 8" (1.422 m)   Wt 98.9 kg (218 lb)   SpO2 97%   Breastfeeding? No   BMI 48.87 kg/m²       Post vital signs: stable    Level of consciousness: awake, alert  and oriented    Nausea/Vomiting: no nausea/no vomiting    Complications: none    Airway Patency: patent    Respiratory: unassisted, spontaneous ventilation, room air    Cardiovascular: stable and blood pressure at baseline    Hydration: euvolemic  "

## 2018-02-28 NOTE — ANESTHESIA POSTPROCEDURE EVALUATION
"Anesthesia Post Evaluation    Patient: Yolanda Betts    Procedure(s) Performed: Procedure(s) (LRB):  COLONOSCOPY (N/A)    Final Anesthesia Type: MAC  Patient location during evaluation: GI PACU  Patient participation: Yes- Able to Participate  Level of consciousness: awake and alert  Post-procedure vital signs: reviewed and stable  Pain management: adequate  Airway patency: patent  PONV status at discharge: No PONV  Anesthetic complications: no      Cardiovascular status: hemodynamically stable and blood pressure returned to baseline  Respiratory status: unassisted, spontaneous ventilation and room air  Hydration status: euvolemic  Follow-up not needed.        Visit Vitals  /89 (BP Location: Left arm, Patient Position: Lying)   Pulse 83   Temp 37.2 °C (98.9 °F) (Oral)   Resp 20   Ht 4' 8" (1.422 m)   Wt 98.9 kg (218 lb)   SpO2 97%   Breastfeeding? No   BMI 48.87 kg/m²       Pain/Elif Score: Pain Assessment Performed: Yes (2/27/2018  6:57 AM)  Presence of Pain: denies (2/27/2018  8:29 AM)  Elif Score: 10 (2/27/2018  8:29 AM)      "

## 2018-02-28 NOTE — ANESTHESIA POSTPROCEDURE EVALUATION
"Anesthesia Post Evaluation    Patient: Yolanda Betts    Procedure(s) Performed: Procedure(s) (LRB):  COLONOSCOPY (N/A)    Final Anesthesia Type: MAC  Patient location during evaluation: GI PACU  Patient participation: Yes- Able to Participate  Level of consciousness: awake and alert and oriented  Post-procedure vital signs: reviewed and stable  Pain management: adequate  Airway patency: patent  PONV status at discharge: No PONV  Anesthetic complications: no      Cardiovascular status: blood pressure returned to baseline  Respiratory status: unassisted, spontaneous ventilation and room air  Hydration status: euvolemic  Follow-up not needed.        Visit Vitals  /89 (BP Location: Left arm, Patient Position: Lying)   Pulse 83   Temp 37.2 °C (98.9 °F) (Oral)   Resp 20   Ht 4' 8" (1.422 m)   Wt 98.9 kg (218 lb)   SpO2 97%   Breastfeeding? No   BMI 48.87 kg/m²       Pain/Elif Score: Pain Assessment Performed: Yes (2/27/2018  6:57 AM)  Presence of Pain: denies (2/27/2018  8:29 AM)  Elif Score: 10 (2/27/2018  8:29 AM)      "

## 2018-02-28 NOTE — TRANSFER OF CARE
"Anesthesia Transfer of Care Note    Patient: Yolanda Betts    Procedure(s) Performed: Procedure(s) (LRB):  COLONOSCOPY (N/A)    Patient location: GI    Anesthesia Type: MAC    Transport from OR: Transported from OR on room air with adequate spontaneous ventilation    Post pain: adequate analgesia    Post assessment: no apparent anesthetic complications    Post vital signs: stable    Level of consciousness: awake, alert and oriented    Nausea/Vomiting: no nausea/vomiting    Complications: none    Transfer of care protocol was followed      Last vitals:   Visit Vitals  /89 (BP Location: Left arm, Patient Position: Lying)   Pulse 83   Temp 37.2 °C (98.9 °F) (Oral)   Resp 20   Ht 4' 8" (1.422 m)   Wt 98.9 kg (218 lb)   SpO2 97%   Breastfeeding? No   BMI 48.87 kg/m²     "

## 2018-02-28 NOTE — H&P (VIEW-ONLY)
Short Stay Endoscopy History and Physical    PCP - Vish Stafford MD    Procedure - EGD and colonoscopy  ASA - II  Mallampati - per anesthesia  History of Anesthesia problems - no  Family history Anesthesia problems -  no     HPI:  This is a 47 y.o. female here for evaluation of :     EGD  Reflux - no  Dysphagia - no  Abdominal pain - no  Diarrhea - yes  Anemia - yes  GI bleeding - no  Other - no    Colonoscopy  Screening - yes  Father with colon cancer in his 60's (patient not sure)  History of polyps - no  Diarrhea - yes  Anemia - yes  Blood in stools - no  Abdominal pain - no  Other - no    ROS:  CONSTITUTIONAL: Denies weight change,  fatigue, fevers, chills, night sweats.  CARDIOVASCULAR: Denies chest pain, shortness of breath, orthopnea and edema.  RESPIRATORY: Denies cough, hemoptysis, dyspnea, and wheezing.  GI: See HPI.    Medical History:   Past Medical History:   Diagnosis Date    Anemia     Anxiety     Asthma 10/11/2016    Bipolar 1 disorder     Diabetes mellitus, type 2     GERD (gastroesophageal reflux disease)        Surgical History:   Past Surgical History:   Procedure Laterality Date    BELT ABDOMINOPLASTY      BREAST SURGERY Bilateral 1996    Reduction     SECTION      X 2    COLONOSCOPY      HYSTERECTOMY      MOUTH SURGERY  1996    TUBAL LIGATION         Family History:   Family History   Problem Relation Age of Onset    Diabetes Mother     Hyperlipidemia Mother     Hypertension Mother     Asthma Mother     COPD Mother     Glaucoma Mother     Thyroid disease Mother     Hypertension Father     Hyperlipidemia Father     Cancer Father      Brain, lung, liver, kidney    Heart disease Maternal Grandmother     Hyperlipidemia Maternal Grandmother     Hypertension Maternal Grandmother     Cataracts Maternal Grandmother     Diabetes Maternal Grandmother     Heart disease Maternal Grandfather     Hyperlipidemia Maternal Grandfather     Hypertension Maternal  "Grandfather     Glaucoma Maternal Grandfather     Cancer Maternal Grandfather     Cataracts Maternal Grandfather     Macular degeneration Maternal Grandfather     Diabetes Maternal Grandfather     Heart disease Paternal Grandmother     Hyperlipidemia Paternal Grandmother     Hypertension Paternal Grandmother     Cataracts Paternal Grandmother     Heart disease Paternal Grandfather     Hyperlipidemia Paternal Grandfather     Hypertension Paternal Grandfather     Cataracts Paternal Grandfather        Social History:   Social History   Substance Use Topics    Smoking status: Never Smoker    Smokeless tobacco: Never Used    Alcohol use 0.0 oz/week      Comment: socially       Allergies:   Review of patient's allergies indicates:   Allergen Reactions    Lortab [hydrocodone-acetaminophen] Itching    Neuromuscular blockers, steroidal      some    Latex, natural rubber Rash    Morphine Rash     itching    Seconal [secobarbital sodium] Rash     itching    Tylox [oxycodone-acetaminophen] Rash        Medications:   No current facility-administered medications on file prior to encounter.      Current Outpatient Prescriptions on File Prior to Encounter   Medication Sig Dispense Refill    BD INSULIN SYRINGE ULTRA-FINE 1/2 mL 30 gauge x 1/2" Syrg   0    LINZESS 145 mcg Cap capsule Take 145 mcg by mouth as needed.   3    aspirin (ECOTRIN) 81 MG EC tablet Take 81 mg by mouth once daily.      ergocalciferol (ERGOCALCIFEROL) 50,000 unit Cap Take 1 capsule (50,000 Units total) by mouth every 7 days. 10 capsule 0    promethazine (PHENERGAN) 25 MG tablet Take 1 tablet (25 mg total) by mouth every 4 to 6 hours as needed for Nausea. sedating 20 tablet 0    rizatriptan (MAXALT) 10 MG tablet Take 1 tablet (10 mg total) by mouth once as needed for Migraine. 6 tablet 0       Physical Exam:  Vital Signs:   Vitals:    02/27/18 0714   BP: (!) 163/100   Pulse: 98   Resp: 17   Temp: 98.5 °F (36.9 °C)     General " Appearance: Well appearing in no acute distress  ENT: OP clear  Chest: CTA B  CV: RRR, no m/r/g  Abd: s/nt/nd/nabs  Ext: no edema    Labs:Reviewed  Lab Results   Component Value Date    WBC 7.29 12/22/2017    HGB 11.5 (L) 12/22/2017    HCT 36.5 (L) 12/22/2017    MCV 86 12/22/2017     12/22/2017     Lab Results   Component Value Date    APTT 27.6 11/23/2016     No components found for: [id number]  Lab Results   Component Value Date    IRON 39 03/31/2017    TIBC 496 (H) 03/31/2017    FERRITIN 35 03/31/2017     Iron def anemia  Diarrhea  Screening        Plan:   I have explained the risks and benefits of upper endoscopy and colonoscopy to the patient including but not limited to bleeding, perforation, infection, and death. The patient wishes to proceed.

## 2018-02-28 NOTE — DISCHARGE INSTRUCTIONS

## 2018-02-28 NOTE — TRANSFER OF CARE
"Anesthesia Transfer of Care Note    Patient: Yolanda Betts    Procedure(s) Performed: Procedure(s) (LRB):  COLONOSCOPY (N/A)    Patient location: Other: GI PACU    Anesthesia Type: MAC    Transport from OR: Transported from OR on room air with adequate spontaneous ventilation    Post pain: adequate analgesia    Post assessment: no apparent anesthetic complications    Post vital signs: stable    Level of consciousness: awake    Nausea/Vomiting: no nausea/vomiting    Complications: none    Transfer of care protocol was followed      Last vitals:   Visit Vitals  /89 (BP Location: Left arm, Patient Position: Lying)   Pulse 83   Temp 37.2 °C (98.9 °F) (Oral)   Resp 20   Ht 4' 8" (1.422 m)   Wt 98.9 kg (218 lb)   SpO2 97%   Breastfeeding? No   BMI 48.87 kg/m²     "

## 2018-02-28 NOTE — ANESTHESIA PREPROCEDURE EVALUATION
02/28/2018  Yolanda Betts is a 47 y.o., female.    Pre-op Assessment    I have reviewed the Patient Summary Reports.     I have reviewed the Nursing Notes.   I have reviewed the Medications.     Review of Systems  Anesthesia Hx:  No problems with previous Anesthesia  History of prior surgery of interest to airway management or planning: Previous anesthesia: General, MAC Denies Family Hx of Anesthesia complications.   Denies Personal Hx of Anesthesia complications.   Social:  Non-Smoker, Social Alcohol Use    Hematology/Oncology:  Hematology Normal   Oncology Normal     EENT/Dental:   chronic allergic rhinitis   Cardiovascular:   Hypertension, well controlled hyperlipidemia ECG has been reviewed. CONCLUSIONS     1 - Normal left ventricular systolic function (EF 60-65%).     2 - Normal left ventricular diastolic function.     3 - Normal right ventricular systolic function .   Normal sinus rhythm  Nonspecific T wave abnormality  Abnormal ECG  When compared with ECG of 07-DEC-2016 09:42,  Previous ECG has undetermined rhythm, needs review  Confirmed by ROBERT MCQUEEN, LEOLA ([id number]) on 8/30/2017 9:31:07 PM   Pulmonary:   Asthma moderate and asymptomatic Shortness of breath Sleep Apnea, CPAP Last use of rescue inhaler appx 2 weeks ago... No further issues. States that breathing is good today    Snores on back.... Uses Cpap 7.5 each night (nasal mask)   Renal/:  Renal/ Normal     Hepatic/GI:   Bowel Prep. GERD, well controlled .   Musculoskeletal:   Arthritis     Neurological:   Neuromuscular Disease, Headaches Migraines  Diabetic neuropathy  Peripheral Neuropathy    Endocrine:   Diabetes, poorly controlled, type 2, using insulin    Dermatological:  Skin Normal    Psych:   Psychiatric History Bipolar with medication         Physical Exam  General:  Morbid Obesity    Airway/Jaw/Neck:  Airway Findings: Mouth  Opening: Normal General Airway Assessment: Adult  Mallampati: IV  Improves to III with phonation.  TM Distance: Normal, at least 6 cm       Chest/Lungs:  Chest/Lungs Findings: Normal Respiratory Rate, Clear to auscultation     Heart/Vascular:  Heart Findings: Rate: Normal  Rhythm: Regular Rhythm             Anesthesia Plan  Type of Anesthesia, risks & benefits discussed:  Anesthesia Type:  MAC  Patient's Preference:   Intra-op Monitoring Plan: standard ASA monitors  Intra-op Monitoring Plan Comments:   Post Op Pain Control Plan:   Post Op Pain Control Plan Comments:   Induction:   IV  Beta Blocker:  Patient is not currently on a Beta-Blocker (No further documentation required).       Informed Consent: Patient understands risks and agrees with Anesthesia plan.  Questions answered. Anesthesia consent signed with patient.  ASA Score: 3     Day of Surgery Review of History & Physical: I have interviewed and examined the patient. I have reviewed the patient's H&P dated: 02/27/18. There are no significant changes.  H&P update referred to the provider.         Ready For Surgery From Anesthesia Perspective.

## 2018-02-28 NOTE — ANESTHESIA RELEASE NOTE
"Anesthesia Release from PACU Note    Patient: Yolanda Betts    Procedure(s) Performed: Procedure(s) (LRB):  COLONOSCOPY (N/A)    Anesthesia type: MAC    Post pain: Adequate analgesia    Post assessment: no apparent anesthetic complications, tolerated procedure well and no evidence of recall    Last Vitals:   Visit Vitals  /89 (BP Location: Left arm, Patient Position: Lying)   Pulse 83   Temp 37.2 °C (98.9 °F) (Oral)   Resp 20   Ht 4' 8" (1.422 m)   Wt 98.9 kg (218 lb)   SpO2 97%   Breastfeeding? No   BMI 48.87 kg/m²       Post vital signs: stable    Level of consciousness: awake    Nausea/Vomiting: no nausea/no vomiting    Complications: none    Airway Patency: patent    Respiratory: unassisted, spontaneous ventilation, room air    Cardiovascular: stable and blood pressure at baseline    Hydration: euvolemic  "

## 2018-03-01 LAB — PANC ISLET CELL IGG SER-ACNC: NORMAL

## 2018-03-06 LAB — GAD65 AB SER-SCNC: 0 NMOL/L

## 2018-03-19 RX ORDER — METFORMIN HYDROCHLORIDE 1000 MG/1
1000 TABLET ORAL 2 TIMES DAILY
Qty: 180 TABLET | Refills: 0 | Status: SHIPPED | OUTPATIENT
Start: 2018-03-19 | End: 2018-07-22 | Stop reason: SDUPTHER

## 2018-03-19 RX ORDER — PEN NEEDLE, DIABETIC 31 GX5/16"
NEEDLE, DISPOSABLE MISCELLANEOUS
Qty: 100 EACH | Refills: 3 | Status: SHIPPED | OUTPATIENT
Start: 2018-03-19 | End: 2019-05-16 | Stop reason: SDUPTHER

## 2018-03-27 RX ORDER — BLOOD-GLUCOSE METER
1 EACH MISCELLANEOUS 3 TIMES DAILY
Qty: 100 STRIP | Refills: 0 | Status: SHIPPED | OUTPATIENT
Start: 2018-03-27 | End: 2019-05-02 | Stop reason: ALTCHOICE

## 2018-04-17 RX ORDER — INSULIN LISPRO 100 [IU]/ML
INJECTION, SOLUTION INTRAVENOUS; SUBCUTANEOUS
Qty: 20 ML | Refills: 2 | Status: SHIPPED | OUTPATIENT
Start: 2018-04-17 | End: 2019-05-02

## 2018-04-18 ENCOUNTER — PATIENT MESSAGE (OUTPATIENT)
Dept: ENDOCRINOLOGY | Facility: CLINIC | Age: 48
End: 2018-04-18

## 2018-05-22 DIAGNOSIS — R00.2 PALPITATIONS: ICD-10-CM

## 2018-05-22 DIAGNOSIS — I10 ESSENTIAL HYPERTENSION: ICD-10-CM

## 2018-05-22 RX ORDER — VERAPAMIL HYDROCHLORIDE 180 MG/1
180 CAPSULE, EXTENDED RELEASE ORAL DAILY
Qty: 30 CAPSULE | Refills: 3 | Status: SHIPPED | OUTPATIENT
Start: 2018-05-22 | End: 2019-05-02

## 2018-07-22 RX ORDER — METFORMIN HYDROCHLORIDE 1000 MG/1
TABLET ORAL
Qty: 180 TABLET | Refills: 0 | Status: SHIPPED | OUTPATIENT
Start: 2018-07-22 | End: 2019-05-02 | Stop reason: SDUPTHER

## 2018-07-24 RX ORDER — INSULIN GLARGINE 300 U/ML
50 INJECTION, SOLUTION SUBCUTANEOUS NIGHTLY
Qty: 18 SYRINGE | Refills: 1 | Status: SHIPPED | OUTPATIENT
Start: 2018-07-24 | End: 2019-04-25

## 2018-09-07 ENCOUNTER — PATIENT OUTREACH (OUTPATIENT)
Dept: ADMINISTRATIVE | Facility: HOSPITAL | Age: 48
End: 2018-09-07

## 2018-09-07 DIAGNOSIS — E11.42 DIABETIC POLYNEUROPATHY ASSOCIATED WITH TYPE 2 DIABETES MELLITUS: Primary | Chronic | ICD-10-CM

## 2018-09-07 DIAGNOSIS — Z79.4 TYPE 2 DIABETES MELLITUS WITH DIABETIC POLYNEUROPATHY, WITH LONG-TERM CURRENT USE OF INSULIN: ICD-10-CM

## 2018-09-07 DIAGNOSIS — E11.42 TYPE 2 DIABETES MELLITUS WITH DIABETIC POLYNEUROPATHY, WITH LONG-TERM CURRENT USE OF INSULIN: ICD-10-CM

## 2018-09-07 NOTE — PROGRESS NOTES
"Contacted patient. Patient declined to schedule labs or PCP Visit. Patient states, " I haven't been in because I don't have any insurance." I inquired if the patient had checked the Affordable HealthCare website. She states, "I have not, I just got a new job and I am waiting to get insurance through them.  I advised patient that her labs were over due as is a visit with her PCP and to please call as soon as she has coverage. Orders place [facility].  "

## 2019-02-27 ENCOUNTER — PATIENT OUTREACH (OUTPATIENT)
Dept: ADMINISTRATIVE | Facility: HOSPITAL | Age: 49
End: 2019-02-27

## 2019-03-20 ENCOUNTER — PATIENT OUTREACH (OUTPATIENT)
Dept: ADMINISTRATIVE | Facility: HOSPITAL | Age: 49
End: 2019-03-20

## 2019-04-23 ENCOUNTER — TELEPHONE (OUTPATIENT)
Dept: INTERNAL MEDICINE | Facility: CLINIC | Age: 49
End: 2019-04-23

## 2019-04-23 ENCOUNTER — PATIENT MESSAGE (OUTPATIENT)
Dept: DIABETES | Facility: CLINIC | Age: 49
End: 2019-04-23

## 2019-04-23 NOTE — TELEPHONE ENCOUNTER
----- Message from Michelle Phillips sent at 4/23/2019  1:10 PM CDT -----  ..Type:  Same Day Appointment Request    Caller is requesting a same day appointment.  Caller declined first available appointment listed below.    Name of Caller:patient  When is the first available appointment?05/01  Symptoms:med refill  Best Call Back Number ..[phone]  Additional Information:

## 2019-04-23 NOTE — TELEPHONE ENCOUNTER
----- Message from Josselyn Stringer sent at 4/23/2019  1:45 PM CDT -----  Contact: Patient  Patient states she needs a refill on insulin, patient has not been seen by Dr Beach yet, last visit for primary care was 12/21/17 by Paxton Sampson . Please call patient back at [phone]. Thank you

## 2019-04-23 NOTE — TELEPHONE ENCOUNTER
Spoke with pt concerning insulin refill request. Informed pt that we have reached out to Diabetes Management Dept about her request b/c she was not an established pt of Dr. Beach and he would not be able to fulfill that refill request until pt was seen with him. TIFFANIE

## 2019-04-23 NOTE — TELEPHONE ENCOUNTER
Informed pt that there are no sooner appts. Pt wanted dr. Beach to fill her insulin, I informed her to call back and leave a message for [facility] staff. Pt verbalized understanding.

## 2019-04-24 ENCOUNTER — TELEPHONE (OUTPATIENT)
Dept: INTERNAL MEDICINE | Facility: CLINIC | Age: 49
End: 2019-04-24

## 2019-04-24 NOTE — TELEPHONE ENCOUNTER
----- Message from Caleb Lee sent at 4/24/2019  2:45 PM CDT -----  Contact: [phone]  ..Type:  Patient Returning Call    Who Called:Yolanda  Who Left Message for Patient:Annia  Does the patient know what this is regarding?:don't know  Would the patient rather a call back or a response via [facility]?  Call back   Best Call Back Number:[phone]  Additional Information:

## 2019-04-24 NOTE — TELEPHONE ENCOUNTER
----- Message from Reba Dominguez sent at 4/24/2019  1:23 PM CDT -----  Type:  Needs Medical Advice    Who Called:  Quincy Guerrero  Symptoms (please be specific):     How long has patient had these symptoms:     Pharmacy name and phone #:    Would the patient rather a call back or a response via [facility]?  Call back  Best Call Back Number:  [phone]  Additional Information  States she had left a message yesterday regarding her Insulin///has not received a return call//please call//thanks/micheal

## 2019-04-24 NOTE — TELEPHONE ENCOUNTER
Spoke with patient over the phone who is looking for a new pcp, wants to see Dr. Beach and is out of insulin. Scheduled patient for next available new patient time slot.

## 2019-04-24 NOTE — TELEPHONE ENCOUNTER
Attempted to call patient to see whats going on, [facility] Looking through chart Dr. Stafford is primary pcp, this is not a Dr. Beach patient. However I did leave a voicemail for patient to call our office.

## 2019-04-25 ENCOUNTER — LAB VISIT (OUTPATIENT)
Dept: LAB | Facility: HOSPITAL | Age: 49
End: 2019-04-25
Attending: FAMILY MEDICINE
Payer: MEDICAID

## 2019-04-25 ENCOUNTER — OFFICE VISIT (OUTPATIENT)
Dept: INTERNAL MEDICINE | Facility: CLINIC | Age: 49
End: 2019-04-25
Payer: MEDICAID

## 2019-04-25 VITALS
HEART RATE: 90 BPM | WEIGHT: 197.75 LBS | BODY MASS INDEX: 44.49 KG/M2 | OXYGEN SATURATION: 95 % | SYSTOLIC BLOOD PRESSURE: 138 MMHG | DIASTOLIC BLOOD PRESSURE: 88 MMHG | RESPIRATION RATE: 18 BRPM | HEIGHT: 56 IN | TEMPERATURE: 97 F

## 2019-04-25 DIAGNOSIS — E55.9 VITAMIN D DEFICIENCY: Chronic | ICD-10-CM

## 2019-04-25 DIAGNOSIS — I15.2 HYPERTENSION COMPLICATING DIABETES: Chronic | ICD-10-CM

## 2019-04-25 DIAGNOSIS — E11.42 DIABETIC POLYNEUROPATHY ASSOCIATED WITH TYPE 2 DIABETES MELLITUS: Primary | Chronic | ICD-10-CM

## 2019-04-25 DIAGNOSIS — F25.0 SCHIZOAFFECTIVE DISORDER, BIPOLAR TYPE: Chronic | ICD-10-CM

## 2019-04-25 DIAGNOSIS — Z12.39 SCREENING FOR BREAST CANCER: ICD-10-CM

## 2019-04-25 DIAGNOSIS — I10 ESSENTIAL HYPERTENSION: Chronic | ICD-10-CM

## 2019-04-25 DIAGNOSIS — G47.33 OBSTRUCTIVE SLEEP APNEA: Chronic | ICD-10-CM

## 2019-04-25 DIAGNOSIS — F31.9 BIPOLAR 1 DISORDER: Chronic | ICD-10-CM

## 2019-04-25 DIAGNOSIS — E11.59 HYPERTENSION COMPLICATING DIABETES: Chronic | ICD-10-CM

## 2019-04-25 DIAGNOSIS — E11.42 DIABETIC POLYNEUROPATHY ASSOCIATED WITH TYPE 2 DIABETES MELLITUS: Chronic | ICD-10-CM

## 2019-04-25 LAB
25(OH)D3+25(OH)D2 SERPL-MCNC: 15 NG/ML (ref 30–96)
CHOLEST SERPL-MCNC: 142 MG/DL (ref 120–199)
CHOLEST/HDLC SERPL: 3.9 {RATIO} (ref 2–5)
HDLC SERPL-MCNC: 36 MG/DL (ref 40–75)
HDLC SERPL: 25.4 % (ref 20–50)
LDLC SERPL CALC-MCNC: 40.4 MG/DL (ref 63–159)
NONHDLC SERPL-MCNC: 106 MG/DL
TRIGL SERPL-MCNC: 328 MG/DL (ref 30–150)

## 2019-04-25 PROCEDURE — 99214 OFFICE O/P EST MOD 30 MIN: CPT | Mod: S$PBB,,, | Performed by: FAMILY MEDICINE

## 2019-04-25 PROCEDURE — 99999 PR PBB SHADOW E&M-EST. PATIENT-LVL V: ICD-10-PCS | Mod: PBBFAC,,, | Performed by: FAMILY MEDICINE

## 2019-04-25 PROCEDURE — 99215 OFFICE O/P EST HI 40 MIN: CPT | Mod: PBBFAC,PN | Performed by: FAMILY MEDICINE

## 2019-04-25 PROCEDURE — 36415 COLL VENOUS BLD VENIPUNCTURE: CPT

## 2019-04-25 PROCEDURE — 99999 PR PBB SHADOW E&M-EST. PATIENT-LVL V: CPT | Mod: PBBFAC,,, | Performed by: FAMILY MEDICINE

## 2019-04-25 PROCEDURE — 99214 PR OFFICE/OUTPT VISIT, EST, LEVL IV, 30-39 MIN: ICD-10-PCS | Mod: S$PBB,,, | Performed by: FAMILY MEDICINE

## 2019-04-25 PROCEDURE — 82306 VITAMIN D 25 HYDROXY: CPT

## 2019-04-25 PROCEDURE — 80061 LIPID PANEL: CPT

## 2019-04-25 RX ORDER — HYDROCHLOROTHIAZIDE 25 MG/1
25 TABLET ORAL DAILY
COMMUNITY
Start: 2018-12-07 | End: 2019-05-02 | Stop reason: ALTCHOICE

## 2019-04-25 RX ORDER — ATORVASTATIN CALCIUM 40 MG/1
40 TABLET, FILM COATED ORAL NIGHTLY
COMMUNITY
Start: 2019-04-15 | End: 2019-05-02

## 2019-04-25 RX ORDER — LOSARTAN POTASSIUM 100 MG/1
100 TABLET ORAL DAILY
COMMUNITY
Start: 2019-02-01 | End: 2019-05-02 | Stop reason: ALTCHOICE

## 2019-04-25 RX ORDER — METOPROLOL SUCCINATE 50 MG/1
50 TABLET, EXTENDED RELEASE ORAL DAILY
COMMUNITY
End: 2019-05-02 | Stop reason: SDUPTHER

## 2019-04-25 RX ORDER — MONTELUKAST SODIUM 10 MG/1
10 TABLET ORAL NIGHTLY
COMMUNITY
Start: 2019-02-01 | End: 2019-05-02 | Stop reason: SDUPTHER

## 2019-04-25 RX ORDER — NITROGLYCERIN 0.4 MG/1
TABLET SUBLINGUAL
COMMUNITY
Start: 2018-10-26 | End: 2019-12-09

## 2019-04-25 RX ORDER — LURASIDONE HYDROCHLORIDE 20 MG/1
40 TABLET, FILM COATED ORAL DAILY
COMMUNITY
Start: 2019-04-16 | End: 2019-05-02

## 2019-04-25 RX ORDER — OMEGA-3-ACID ETHYL ESTERS 1 G/1
2 CAPSULE, LIQUID FILLED ORAL DAILY
COMMUNITY
Start: 2018-10-26 | End: 2019-05-02

## 2019-04-25 NOTE — ASSESSMENT & PLAN NOTE
DIABETIC FOOT EXAM: Inspection of feet reveals no open wounds, ulcerations, significant calluses, or evidence of arterial insufficiency. 10g monofilament sensation is ABSENT in all 5/5 locations tested on the right and ABSENT in 3/5 locations tested on left.

## 2019-04-30 ENCOUNTER — TELEPHONE (OUTPATIENT)
Dept: PHARMACY | Facility: CLINIC | Age: 49
End: 2019-04-30

## 2019-04-30 NOTE — TELEPHONE ENCOUNTER
Spoke w/ PT notifying her [place] approved resulting in $0.00 copayment.    Patient will  on Wednesday 5/1/19.    PA Information:  [place] [employer] [facility]  [place] Approval Dates 4/30/19-4/29/2020  Approved for Levemir Flextouch 15mL per 21 days    Thank You,   Consuelo Wolfe  Patient Care Advocate   [facility]  Phone: [phone]  Fax: [phone]

## 2019-05-02 ENCOUNTER — HOSPITAL ENCOUNTER (OUTPATIENT)
Dept: RADIOLOGY | Facility: HOSPITAL | Age: 49
Discharge: HOME | End: 2019-05-02
Attending: FAMILY MEDICINE
Payer: MEDICAID

## 2019-05-02 ENCOUNTER — PATIENT MESSAGE (OUTPATIENT)
Dept: ADMINISTRATIVE | Facility: OTHER | Age: 49
End: 2019-05-02

## 2019-05-02 ENCOUNTER — OFFICE VISIT (OUTPATIENT)
Dept: INTERNAL MEDICINE | Facility: CLINIC | Age: 49
End: 2019-05-02
Payer: MEDICAID

## 2019-05-02 VITALS
SYSTOLIC BLOOD PRESSURE: 118 MMHG | OXYGEN SATURATION: 97 % | WEIGHT: 196.19 LBS | TEMPERATURE: 98 F | DIASTOLIC BLOOD PRESSURE: 78 MMHG | BODY MASS INDEX: 44.13 KG/M2 | HEIGHT: 56 IN | HEART RATE: 81 BPM

## 2019-05-02 VITALS — BODY MASS INDEX: 44.32 KG/M2 | WEIGHT: 197 LBS | HEIGHT: 56 IN

## 2019-05-02 DIAGNOSIS — E55.9 VITAMIN D DEFICIENCY: Primary | Chronic | ICD-10-CM

## 2019-05-02 DIAGNOSIS — E11.42 DIABETIC POLYNEUROPATHY ASSOCIATED WITH TYPE 2 DIABETES MELLITUS: Chronic | ICD-10-CM

## 2019-05-02 DIAGNOSIS — Z79.4 TYPE 2 DIABETES MELLITUS WITH OTHER SPECIFIED COMPLICATION, WITH LONG-TERM CURRENT USE OF INSULIN: ICD-10-CM

## 2019-05-02 DIAGNOSIS — E55.9 VITAMIN D INSUFFICIENCY: ICD-10-CM

## 2019-05-02 DIAGNOSIS — J45.30 MILD PERSISTENT ASTHMA WITHOUT COMPLICATION: Chronic | ICD-10-CM

## 2019-05-02 DIAGNOSIS — M79.7 FIBROMYALGIA: Chronic | ICD-10-CM

## 2019-05-02 DIAGNOSIS — E11.59 HYPERTENSION COMPLICATING DIABETES: ICD-10-CM

## 2019-05-02 DIAGNOSIS — R94.2 DIFFUSION CAPACITY OF LUNG (DL), DECREASED: Chronic | ICD-10-CM

## 2019-05-02 DIAGNOSIS — E78.5 DYSLIPIDEMIA ASSOCIATED WITH TYPE 2 DIABETES MELLITUS: ICD-10-CM

## 2019-05-02 DIAGNOSIS — Z12.39 SCREENING FOR BREAST CANCER: ICD-10-CM

## 2019-05-02 DIAGNOSIS — I10 ESSENTIAL HYPERTENSION: Chronic | ICD-10-CM

## 2019-05-02 DIAGNOSIS — E78.1 HYPERTRIGLYCERIDEMIA: Chronic | ICD-10-CM

## 2019-05-02 DIAGNOSIS — E11.69 TYPE 2 DIABETES MELLITUS WITH OTHER SPECIFIED COMPLICATION, WITH LONG-TERM CURRENT USE OF INSULIN: ICD-10-CM

## 2019-05-02 DIAGNOSIS — R06.09 DYSPNEA ON EXERTION: ICD-10-CM

## 2019-05-02 DIAGNOSIS — I15.2 HYPERTENSION COMPLICATING DIABETES: ICD-10-CM

## 2019-05-02 DIAGNOSIS — E11.69 DYSLIPIDEMIA ASSOCIATED WITH TYPE 2 DIABETES MELLITUS: ICD-10-CM

## 2019-05-02 DIAGNOSIS — G47.33 OBSTRUCTIVE SLEEP APNEA: Chronic | ICD-10-CM

## 2019-05-02 PROCEDURE — 71046 XR CHEST PA AND LATERAL: ICD-10-PCS | Mod: 26,,, | Performed by: RADIOLOGY

## 2019-05-02 PROCEDURE — 99214 OFFICE O/P EST MOD 30 MIN: CPT | Mod: S$PBB,,, | Performed by: FAMILY MEDICINE

## 2019-05-02 PROCEDURE — 99999 PR PBB SHADOW E&M-EST. PATIENT-LVL V: ICD-10-PCS | Mod: PBBFAC,,, | Performed by: FAMILY MEDICINE

## 2019-05-02 PROCEDURE — 77067 SCR MAMMO BI INCL CAD: CPT | Mod: TC

## 2019-05-02 PROCEDURE — 99214 PR OFFICE/OUTPT VISIT, EST, LEVL IV, 30-39 MIN: ICD-10-PCS | Mod: S$PBB,,, | Performed by: FAMILY MEDICINE

## 2019-05-02 PROCEDURE — 99215 OFFICE O/P EST HI 40 MIN: CPT | Mod: PBBFAC,25 | Performed by: FAMILY MEDICINE

## 2019-05-02 PROCEDURE — 77067 MAMMO DIGITAL SCREENING BILAT WITH CAD: ICD-10-PCS | Mod: 26,,, | Performed by: RADIOLOGY

## 2019-05-02 PROCEDURE — 71046 X-RAY EXAM CHEST 2 VIEWS: CPT | Mod: TC

## 2019-05-02 PROCEDURE — 71046 X-RAY EXAM CHEST 2 VIEWS: CPT | Mod: 26,,, | Performed by: RADIOLOGY

## 2019-05-02 PROCEDURE — 77067 SCR MAMMO BI INCL CAD: CPT | Mod: 26,,, | Performed by: RADIOLOGY

## 2019-05-02 PROCEDURE — 99999 PR PBB SHADOW E&M-EST. PATIENT-LVL V: CPT | Mod: PBBFAC,,, | Performed by: FAMILY MEDICINE

## 2019-05-02 RX ORDER — METOPROLOL SUCCINATE 100 MG/1
100 TABLET, EXTENDED RELEASE ORAL DAILY
Qty: 30 TABLET | Refills: 5 | Status: SHIPPED | OUTPATIENT
Start: 2019-05-02 | End: 2019-11-04 | Stop reason: SDUPTHER

## 2019-05-02 RX ORDER — LURASIDONE HYDROCHLORIDE 60 MG/1
60 TABLET, FILM COATED ORAL DAILY
COMMUNITY
End: 2019-08-12

## 2019-05-02 RX ORDER — FLUTICASONE PROPIONATE AND SALMETEROL 250; 50 UG/1; UG/1
POWDER RESPIRATORY (INHALATION)
Qty: 60 EACH | Refills: 11 | Status: SHIPPED | OUTPATIENT
Start: 2019-05-02 | End: 2019-07-08

## 2019-05-02 RX ORDER — METFORMIN HYDROCHLORIDE 1000 MG/1
TABLET ORAL
Qty: 180 TABLET | Refills: 11 | Status: SHIPPED | OUTPATIENT
Start: 2019-05-02 | End: 2020-05-21

## 2019-05-02 RX ORDER — NAPROXEN 500 MG/1
500 TABLET ORAL 2 TIMES DAILY PRN
Qty: 60 TABLET | Refills: 3 | Status: SHIPPED | OUTPATIENT
Start: 2019-05-02 | End: 2019-08-12

## 2019-05-02 RX ORDER — MIRTAZAPINE 45 MG/1
45 TABLET, FILM COATED ORAL NIGHTLY
COMMUNITY
Start: 2019-04-30 | End: 2019-12-09

## 2019-05-02 RX ORDER — VERAPAMIL HYDROCHLORIDE 120 MG/1
120 TABLET, FILM COATED ORAL 2 TIMES DAILY
Qty: 60 TABLET | Refills: 11 | Status: SHIPPED | OUTPATIENT
Start: 2019-05-02 | End: 2019-08-12

## 2019-05-02 RX ORDER — MONTELUKAST SODIUM 10 MG/1
10 TABLET ORAL NIGHTLY
Qty: 30 TABLET | Refills: 11 | Status: SHIPPED | OUTPATIENT
Start: 2019-05-02 | End: 2019-12-09

## 2019-05-02 RX ORDER — AMOXICILLIN 500 MG
1 CAPSULE ORAL DAILY
COMMUNITY
End: 2022-08-26

## 2019-05-02 RX ORDER — ERGOCALCIFEROL 1.25 MG/1
50000 CAPSULE ORAL
Qty: 12 CAPSULE | Refills: 0 | Status: SHIPPED | OUTPATIENT
Start: 2019-05-02 | End: 2019-07-21 | Stop reason: SDUPTHER

## 2019-05-02 RX ORDER — CHOLECALCIFEROL (VITAMIN D3) 50 MCG
1 TABLET ORAL DAILY
Qty: 30 TABLET | Refills: 33 | Status: SHIPPED | OUTPATIENT
Start: 2019-07-25 | End: 2019-07-22 | Stop reason: ALTCHOICE

## 2019-05-02 RX ORDER — DEXTROSE 4 G
TABLET,CHEWABLE ORAL
Qty: 1 EACH | Refills: 0 | Status: SHIPPED | OUTPATIENT
Start: 2019-05-02 | End: 2019-08-13 | Stop reason: SDUPTHER

## 2019-05-02 RX ORDER — ATORVASTATIN CALCIUM 20 MG/1
20 TABLET, FILM COATED ORAL NIGHTLY
Qty: 90 TABLET | Refills: 3 | Status: SHIPPED | OUTPATIENT
Start: 2019-05-02 | End: 2020-05-21

## 2019-05-02 RX ORDER — LOSARTAN POTASSIUM AND HYDROCHLOROTHIAZIDE 25; 100 MG/1; MG/1
1 TABLET ORAL DAILY
Qty: 90 TABLET | Refills: 3 | Status: SHIPPED | OUTPATIENT
Start: 2019-05-02 | End: 2019-06-04 | Stop reason: ALTCHOICE

## 2019-05-02 RX ORDER — ALBUTEROL SULFATE 90 UG/1
2 AEROSOL, METERED RESPIRATORY (INHALATION) EVERY 4 HOURS PRN
Qty: 8.5 G | Refills: 5 | Status: SHIPPED | OUTPATIENT
Start: 2019-05-02 | End: 2019-12-09

## 2019-05-02 RX ORDER — LANCETS 28 GAUGE
EACH MISCELLANEOUS
Qty: 100 EACH | Refills: 11 | Status: SHIPPED | OUTPATIENT
Start: 2019-05-02 | End: 2020-11-27 | Stop reason: SDUPTHER

## 2019-05-02 NOTE — PROGRESS NOTES
CHIEF COMPLAINT  Establish Care; Diabetes; and Hypertension    This is my first time treating her here. All problems addressed today are NEW TO ME.  She is requesting that I take over as her primary care provider.     HISTORY, ASSESSMENT, and PLAN    1. Diabetic polyneuropathy associated with type 2 diabetes mellitus    2. Obstructive sleep apnea    3. Bipolar 1 disorder    4. Schizoaffective disorder, bipolar type    5. Vitamin D deficiency    6. Screening for breast cancer    7. Essential hypertension    8. Hypertension complicating diabetes         Problem List Items Addressed This Visit        Neuro    Diabetic polyneuropathy associated with type 2 diabetes mellitus - Primary (Chronic)    Current Assessment & Plan     DIABETIC FOOT EXAM: Inspection of feet reveals no open wounds, ulcerations, significant calluses, or evidence of arterial insufficiency. 10g monofilament sensation is ABSENT in all 5/5 locations tested on the right and ABSENT in 3/5 locations tested on left.         Relevant Medications    insulin detemir U-100 (LEVEMIR FLEXTOUCH) 100 unit/mL (3 mL) SubQ InPn pen    Other Relevant Orders    Lipid panel (Completed)    Microalbumin/creatinine urine ratio (Completed)    Ambulatory referral to Optometry    Ambulatory referral to Podiatry       Psychiatric    Bipolar 1 disorder (Chronic)    Overview     PSYCHIATRIST: Dr. Sabillon         Current Assessment & Plan     Recently discharged from psych. Doing better. Has appointment with Dr. Sabillon in 1 week.         Schizoaffective disorder, bipolar type (Chronic)    Overview     PSYCHIATRIST: Dr. Sabillon         Current Assessment & Plan     Recently discharged from psych. Doing better. Has appointment with Dr. Sabillon in 1 week.            Cardiac/Vascular    Essential hypertension (Chronic)    Hypertension complicating diabetes (Chronic)    Current Assessment & Plan     Borderline controlled.  Asymptomatic.         Relevant Medications    insulin detemir  "U-100 (LEVEMIR FLEXTOUCH) 100 unit/mL (3 mL) SubQ InPn pen       Endocrine    Vitamin D deficiency (Chronic)    Current Assessment & Plan     Last vitamin-D level was over three years ago. She is due for follow-up labs regarding this problem.          Relevant Orders    Vitamin D (Completed)       Other    Obstructive sleep apnea (Chronic)    Current Assessment & Plan     Off CPAP. She says she lost her CPAP in move to [place].         Relevant Orders    Ambulatory referral to Sleep Disorders      Other Visit Diagnoses     Screening for breast cancer        Relevant Orders    Mammo Digital Screening Bilat (Completed)           Outpatient Medications Prior to Visit   Medication Sig Dispense Refill    alprazolam (XANAX) 1 MG tablet Take 1 mg by mouth 3 (three) times daily as needed.      aspirin (ECOTRIN) 81 MG EC tablet Take 81 mg by mouth once daily.      BD INSULIN PEN NEEDLE UF MINI 31 gauge x 3/16" Ndle USE TO TEST TWICE A  each 3    BD INSULIN SYRINGE ULTRA-FINE 1/2 mL 30 gauge x 1/2" Syrg   0    duloxetine (CYMBALTA) 60 MG capsule Take 60 mg by mouth 2 (two) times daily.  2    LINZESS 145 mcg Cap capsule Take 145 mcg by mouth as needed.   3    MULTIVIT,CALC,MINS/IRON/FOLIC (DAILY MULTIPLE FOR WOMEN ORAL) Take 1 tablet by mouth once daily.      nitroGLYCERIN (NITROSTAT) 0.4 MG SL tablet Dissolve 1 tablet under the tongue every 5 minutes as needed, up to 3 times. If chest pain persists, call 911.      promethazine (PHENERGAN) 25 MG tablet Take 1 tablet (25 mg total) by mouth every 4 to 6 hours as needed for Nausea. sedating 20 tablet 0    zolpidem (AMBIEN) 10 mg Tab Take 10 mg by mouth nightly as needed.      ADVAIR DISKUS 250-50 mcg/dose diskus inhaler INHALE 1 PUFF INTO THE LUNGS 2 (TWO) TIMES DAILY *RINSE MOUTH AFTER EACH USE* 60 each 11    albuterol 90 mcg/actuation inhaler Inhale 2 puffs into the lungs every 4 (four) hours as needed.      atorvastatin (LIPITOR) 40 MG tablet Take 40 mg by " mouth nightly.      blood-glucose meter (TRUE METRIX AIR GLUCOSE METER) kit Use as instructed- True Metrix Meter 1 each 0    empagliflozin (JARDIANCE) 10 mg Tab Take 10 mg by mouth every morning.      ergocalciferol (ERGOCALCIFEROL) 50,000 unit Cap Take 1 capsule (50,000 Units total) by mouth every 7 days. 10 capsule 0    gabapentin (NEURONTIN) 300 MG capsule TAKE ONE CAPSULE BY MOUTH 3 TIMES PER DAY RTS 8.8.2017 90 capsule 0    hydroCHLOROthiazide (HYDRODIURIL) 25 MG tablet Take 25 mg by mouth once daily.      insulin detemir U-100 (LEVEMIR) 100 unit/mL injection Inject 70 Units into the skin every evening.      lancets (TRUEPLUS LANCETS) 33 gauge Misc 1 lancet by Misc.(Non-Drug; Combo Route) route 3 (three) times daily. 100 each 11    losartan (COZAAR) 100 MG tablet Take 100 mg by mouth once daily.      lurasidone (LATUDA) 20 mg Tab tablet Take 40 mg by mouth once daily. Take 1 tablet by mouth daily with food.      metFORMIN (GLUCOPHAGE) 1000 MG tablet TAKE 1 TABLET (1,000 MG TOTAL) BY MOUTH 2 (TWO) TIMES DAILY 180 tablet 0    metoprolol succinate (TOPROL-XL) 50 MG 24 hr tablet Take 50 mg by mouth once daily. Take 2 tablets by mouth daily      mirtazapine (REMERON) 30 MG tablet Take 30 mg by mouth nightly.  2    montelukast (SINGULAIR) 10 mg tablet Take 10 mg by mouth nightly.      naproxen (NAPROSYN) 500 MG tablet Take 1 tablet (500 mg total) by mouth every 12 (twelve) hours as needed (pain). 20 tablet 0    omega-3 acid ethyl esters (LOVAZA) 1 gram capsule Take 2 g by mouth once daily.      ONETOUCH VERIO Strp 1 STRIP [place] [facility]UG [place]BO [place]OUTE[place] ROUTE 3 (THREE) TIMES DAILY. VERIO TEST STRIPS 100 strip 0    pravastatin (PRAVACHOL) 80 MG tablet TAKE 1 TABLET BY MOUTH ONCE DAILY 30 tablet 4    verapamil (VERELAN) 180 MG C24P TAKE 1 CAPSULE (180 MG TOTAL) BY MOUTH ONCE DAILY. 30 capsule 3    aripiprazole (ABILIFY) 30 MG Tab Take 30 mg by mouth nightly.  0    HUMALOG U-100 INSULIN 100 unit/mL  "injection INJECT 18 UNITS UNDER THE SKIN WITH MEALS AS DIRECTED 20 mL 2    losartan-hydrochlorothiazide 100-25 mg (HYZAAR) 100-25 mg per tablet Take 1 tablet by mouth once daily. 90 tablet 3    TOUJEO SOLOSTAR U-300 INSULIN 300 unit/mL (1.5 mL) InPn pen INJECT 50 UNITS INTO THE SKIN EVERY EVENING. 18 Syringe 1     No facility-administered medications prior to visit.         Medications Ordered This Encounter   Medications    insulin detemir U-100 (LEVEMIR FLEXTOUCH) 100 unit/mL (3 mL) SubQ InPn pen     Sig: Inject 70 Units into the skin every evening.     Dispense:  15 mL     Refill:  1     If this drug is non-formulary status, I will authorize therapeutic substitution. Thank you.     Order Specific Question:   This medication typically requires a prior authorization. For prior auth services, patient financial assistance, patient education and medication management send to an [facility] pharmacy.     Answer:   Yes [facility]       Medications Discontinued During This Encounter   Medication Reason    insulin detemir U-100 (LEVEMIR) 100 unit/mL injection Reorder    TOUJEO SOLOSTAR U-300 INSULIN 300 unit/mL (1.5 mL) InPn pen Patient no longer taking    aripiprazole (ABILIFY) 30 MG Tab Patient no longer taking        Follow up in about 1 week (around 5/2/2019) for review test results and discuss treatment plan, re-evaluate problem(s) discussed today.    REVIEW OF SYSTEMS  CARDIOVASCULAR: No angina or orthopnea reported.   ENDOCRINE: No polyuria or polydipsia reported.     PHYSICAL EXAM  Vitals:    04/25/19 1052   BP: 138/88   BP Location: Right arm   Patient Position: Sitting   BP Method: Large (Manual)   Pulse: 90   Resp: 18   Temp: 97.1 °F (36.2 °C)   TempSrc: Tympanic   SpO2: 95%   Weight: 89.7 kg (197 lb 12 oz)   Height: 4' 8" (1.422 m)     CONSTITUTIONAL: Vital signs noted. No apparent distress. Does not appear acutely ill or septic. Appears adequately hydrated.  HEENT: External " ENT grossly unremarkable. Hearing grossly intact. Oropharynx moist.  PULM: Lungs clear. Breathing unlabored.  HEART: Auscultation reveals regular rate and rhythm without murmur, gallop or rub.  DERM: Skin warm and moist with normal turgor.  NEURO: There are no gross focal motor deficits or gross deficits of cranial nerves III-XII.  PSYCHIATRIC: Alert and conversant. Mood grossly neutral. Judgment and insight not grossly compromised.     PAST MEDICAL HISTORY  She has a past medical history of Anemia, Anxiety, Arthritis, Asthma, Bipolar 1 disorder, Diabetes mellitus, type 2, GERD (gastroesophageal reflux disease), Hypertension, Hypertension complicating diabetes, Mild persistent asthma without complication, Obstructive sleep apnea, Schizoaffective disorder, bipolar type, and Type 2 diabetes mellitus with hyperglycemia, without long-term current use of insulin.    SURGICAL HISTORY  She has a past surgical history that includes  section; Mouth surgery (); Hysterectomy; Breast surgery (Bilateral, ); Tubal ligation; Colonoscopy; Belt abdominoplasty (); Colonoscopy (N/A, 2018); and Colonoscopy (N/A, 2018).    FAMILY HISTORY  Her family history includes Asthma in her mother; Breast cancer in her maternal cousin, maternal cousin, maternal cousin, and maternal cousin; COPD in her mother; Cancer in her father and maternal grandfather; Cataracts in her maternal grandfather, maternal grandmother, paternal grandfather, and paternal grandmother; Diabetes in her maternal grandfather, maternal grandmother, and mother; Glaucoma in her maternal grandfather and mother; Heart disease in her maternal grandfather, maternal grandmother, paternal grandfather, and paternal grandmother; Hyperlipidemia in her father, maternal grandfather, maternal grandmother, mother, paternal grandfather, and paternal grandmother; Hypertension in her father, maternal grandfather, maternal grandmother, mother, paternal  "grandfather, and paternal grandmother; Macular degeneration in her maternal grandfather; Thyroid disease in her mother.     ALLERGIES  Review of patient's allergies indicates:   Allergen Reactions    Lortab [hydrocodone-acetaminophen] Itching    Neuromuscular blockers, steroidal      some    Latex, natural rubber Rash    Morphine Rash     itching    Seconal [secobarbital sodium] Rash     itching    Tylox [oxycodone-acetaminophen] Rash       SOCIAL HISTORY  TOBACCO USE HISTORY: She reports that she has never smoked. She has never used smokeless tobacco.  ALCOHOL USE HISTORY:  She reports that she drinks alcohol.  RECREATIONAL DRUG USE HISTORY:  She reports that she does not use drugs.    Documentation entered by me for this encounter may have been done in part using speech-recognition technology. Although I have made an effort to ensure accuracy, "sound like" errors may exist and should be interpreted in context. -DOMINIK Beach MD   "

## 2019-05-02 NOTE — PROGRESS NOTES
CHIEF COMPLAINT  Follow-up; Medication Refill; and Results      HISTORY, ASSESSMENT, and PLAN    1. Vitamin D deficiency    2. Essential hypertension    3. Hypertension complicating diabetes    4. Vitamin D insufficiency    5. Obstructive sleep apnea    6. Fibromyalgia    7. Dyspnea on exertion    8. Diabetic polyneuropathy associated with type 2 diabetes mellitus    9. Type 2 diabetes mellitus with other specified complication, with long-term current use of insulin    10. Hypertriglyceridemia    11. Diffusion capacity of lung (dl), decreased    12. Mild persistent asthma without complication    13. Uncontrolled type 2 diabetes mellitus with diabetic polyneuropathy, with long-term current use of insulin    14. Dyslipidemia associated with type 2 diabetes mellitus        Problem List Items Addressed This Visit        Neuro    Diabetic polyneuropathy associated with type 2 diabetes mellitus (Chronic)    Relevant Medications    metFORMIN (GLUCOPHAGE) 1000 MG tablet    Other Relevant Orders    Ambulatory Referral to Cardiology    X-Ray Chest PA And Lateral (Completed)       Pulmonary    Diffusion capacity of lung (dl), decreased (Chronic)    Relevant Orders    Ambulatory referral to Pulmonology    Mild persistent asthma without complication (Chronic)    Relevant Medications    fluticasone-salmeterol diskus inhaler 250-50 mcg    montelukast (SINGULAIR) 10 mg tablet    albuterol (PROVENTIL/VENTOLIN HFA) 90 mcg/actuation inhaler    Other Relevant Orders    Ambulatory referral to Pulmonology       Cardiac/Vascular    Dyslipidemia associated with type 2 diabetes mellitus (Chronic)    Relevant Medications    atorvastatin (LIPITOR) 20 MG tablet    metFORMIN (GLUCOPHAGE) 1000 MG tablet    Essential hypertension (Chronic)    Relevant Medications    losartan-hydrochlorothiazide 100-25 mg (HYZAAR) 100-25 mg per tablet    verapamil (CALAN) 120 MG tablet    metoprolol succinate (TOPROL-XL) 100 MG 24 hr tablet    Other Relevant  Orders    Hypertension Digital Medicine (HDMP) Enrollment Order (Completed)    Hypertension Digital Medicine (HDM): Assign Onboarding Questionnaires (Completed)    Ambulatory Referral to Cardiology    Hypertension complicating diabetes (Chronic)    Relevant Medications    losartan-hydrochlorothiazide 100-25 mg (HYZAAR) 100-25 mg per tablet    metFORMIN (GLUCOPHAGE) 1000 MG tablet    Other Relevant Orders    Hypertension Digital Medicine (HDMP) Enrollment Order (Completed)    Hypertension Digital Medicine ([facility]): Assign Onboarding Questionnaires (Completed)    Hypertriglyceridemia (Chronic)       Endocrine    Type 2 diabetes mellitus with hyperglycemia, without long-term current use of insulin (Chronic)    Relevant Medications    metFORMIN (GLUCOPHAGE) 1000 MG tablet    empagliflozin (JARDIANCE) 10 mg Tab    blood-glucose meter Misc    lancets 28 gauge Misc    blood sugar diagnostic Strp    Vitamin D deficiency - Primary (Chronic)       Orthopedic    Fibromyalgia (Chronic)    Relevant Medications    naproxen (NAPROSYN) 500 MG tablet    Other Relevant Orders    Ambulatory Referral to Rheumatology       Other    Obstructive sleep apnea (Chronic)    Current Assessment & Plan     She has history of obstructive sleep apnea, diagnosed by polysomnography, previously treated with CPAP, but she says that she lost her CPAP during her move from [place].         Relevant Orders    Ambulatory referral to Pulmonology      Other Visit Diagnoses     Vitamin D insufficiency        Relevant Medications    ergocalciferol (ERGOCALCIFEROL) 50,000 unit Cap    cholecalciferol, vitamin D3, (VITAMIN D3) 2,000 unit Tab (Start on 7/25/2019)    Dyspnea on exertion        Relevant Orders    Ambulatory referral to Pulmonology    Uncontrolled type 2 diabetes mellitus with diabetic polyneuropathy, with long-term current use of insulin        Relevant Medications    metFORMIN (GLUCOPHAGE) 1000 MG tablet           Outpatient Medications Prior to  "Visit   Medication Sig Dispense Refill    alprazolam (XANAX) 1 MG tablet Take 1 mg by mouth 3 (three) times daily as needed.      aspirin (ECOTRIN) 81 MG EC tablet Take 81 mg by mouth once daily.      BD INSULIN PEN NEEDLE UF MINI 31 gauge x 3/16" Ndle USE TO TEST TWICE A  each 3    BD INSULIN SYRINGE ULTRA-FINE 1/2 mL 30 gauge x 1/2" Syrg   0    duloxetine (CYMBALTA) 60 MG capsule Take 60 mg by mouth 2 (two) times daily.  2    fish oil-omega-3 fatty acids 300-1,000 mg capsule Take 1 capsule by mouth once daily.      insulin detemir U-100 (LEVEMIR FLEXTOUCH) 100 unit/mL (3 mL) SubQ InPn pen Inject 70 Units into the skin every evening. 15 mL 1    LINZESS 145 mcg Cap capsule Take 145 mcg by mouth as needed.   3    lurasidone (LATUDA) 60 mg Tab tablet Take 60 mg by mouth once daily.      MULTIVIT,CALC,MINS/IRON/FOLIC (DAILY MULTIPLE FOR WOMEN ORAL) Take 1 tablet by mouth once daily.      promethazine (PHENERGAN) 25 MG tablet Take 1 tablet (25 mg total) by mouth every 4 to 6 hours as needed for Nausea. sedating 20 tablet 0    zolpidem (AMBIEN) 10 mg Tab Take 10 mg by mouth nightly as needed.      ADVAIR DISKUS 250-50 mcg/dose diskus inhaler INHALE 1 PUFF INTO THE LUNGS 2 (TWO) TIMES DAILY *RINSE MOUTH AFTER EACH USE* 60 each 11    albuterol 90 mcg/actuation inhaler Inhale 2 puffs into the lungs every 4 (four) hours as needed.      atorvastatin (LIPITOR) 40 MG tablet Take 40 mg by mouth nightly.      blood-glucose meter (TRUE METRIX AIR GLUCOSE METER) kit Use as instructed- True Metrix Meter 1 each 0    empagliflozin (JARDIANCE) 10 mg Tab Take 10 mg by mouth every morning.      hydroCHLOROthiazide (HYDRODIURIL) 25 MG tablet Take 25 mg by mouth once daily.      lancets (TRUEPLUS LANCETS) 33 gauge Misc 1 lancet by Misc.(Non-Drug; Combo Route) route 3 (three) times daily. 100 each 11    losartan (COZAAR) 100 MG tablet Take 100 mg by mouth once daily.      metFORMIN (GLUCOPHAGE) 1000 MG tablet " TAKE 1 TABLET (1,000 MG TOTAL) BY MOUTH 2 (TWO) TIMES DAILY. 180 tablet 0    metoprolol succinate (TOPROL-XL) 50 MG 24 hr tablet Take 50 mg by mouth once daily. Take 2 tablets by mouth daily      mirtazapine (REMERON) 30 MG tablet Take 30 mg by mouth nightly.  2    montelukast (SINGULAIR) 10 mg tablet Take 10 mg by mouth nightly.      naproxen (NAPROSYN) 500 MG tablet Take 1 tablet (500 mg total) by mouth every 12 (twelve) hours as needed (pain). 20 tablet 0    ONETOUCH VERIO Strp 1 STRIP BY [facility]NON-DRUG COMBO ROUTE) ROUTE 3 (THREE) TIMES DAILY. VERIO TEST STRIPS 100 strip 0    pravastatin (PRAVACHOL) 80 MG tablet TAKE 1 TABLET BY MOUTH ONCE DAILY 30 tablet 4    verapamil (VERELAN) 180 MG C24P TAKE 1 CAPSULE (180 MG TOTAL) BY MOUTH ONCE DAILY. 30 capsule 3    mirtazapine (REMERON) 45 MG tablet Take 45 mg by mouth every evening.      nitroGLYCERIN (NITROSTAT) 0.4 MG SL tablet Dissolve 1 tablet under the tongue every 5 minutes as needed, up to 3 times. If chest pain persists, call 911.      ergocalciferol (ERGOCALCIFEROL) 50,000 unit Cap Take 1 capsule (50,000 Units total) by mouth every 7 days. 10 capsule 0    gabapentin (NEURONTIN) 300 MG capsule TAKE ONE CAPSULE BY MOUTH 3 TIMES PER DAY RTS 8.8.2017 90 capsule 0    HUMALOG U-100 INSULIN 100 unit/mL injection INJECT 18 UNITS UNDER THE SKIN WITH MEALS AS DIRECTED 20 mL 2    losartan-hydrochlorothiazide 100-25 mg (HYZAAR) 100-25 mg per tablet Take 1 tablet by mouth once daily. 90 tablet 3    lurasidone (LATUDA) 20 mg Tab tablet Take 40 mg by mouth once daily. Take 1 tablet by mouth daily with food.      omega-3 acid ethyl esters (LOVAZA) 1 gram capsule Take 2 g by mouth once daily.       No facility-administered medications prior to visit.         Medications Ordered This Encounter   Medications    albuterol (PROVENTIL/VENTOLIN HFA) 90 mcg/actuation inhaler     Sig: Inhale 2 puffs into the lungs every 4 (four) hours as needed.     Dispense:  8.5 g      Refill:  5    atorvastatin (LIPITOR) 20 MG tablet     Sig: Take 1 tablet (20 mg total) by mouth every evening.     Dispense:  90 tablet     Refill:  3    blood sugar diagnostic Strp     Sig: Check blood glucose 3 times daily as directed and as needed (dispense insurance preferred brand or patient choice)     Dispense:  100 each     Refill:  11    blood-glucose meter Misc     Sig: use 3 times daily as directed     Dispense:  1 each     Refill:  0    cholecalciferol, vitamin D3, (VITAMIN D3) 2,000 unit Tab     Sig: Take 1 tablet (2,000 Units total) by mouth once daily.     Dispense:  30 tablet     Refill:  33     If insurance does not cover, please offer her a quality generic OTC equivalent. Thanks.    empagliflozin (JARDIANCE) 10 mg Tab     Sig: Take 1 tablet (10 mg) by mouth every morning.     Dispense:  30 tablet     Refill:  5     PHARMACIST: If this drug is non-formulary status, please message me with suggestion for formulary product, and I will authorize therapeutic substitution. Thank you.    ergocalciferol (ERGOCALCIFEROL) 50,000 unit Cap     Sig: Take 1 capsule (50,000 Units total) by mouth every 7 days. for 12 doses     Dispense:  12 capsule     Refill:  0    fluticasone-salmeterol diskus inhaler 250-50 mcg     Sig: INHALE 1 PUFF INTO THE LUNGS 2 (TWO) TIMES DAILY *RINSE MOUTH AFTER EACH USE*     Dispense:  60 each     Refill:  11     Order Specific Question:   This medication typically requires a prior authorization. For prior auth services, patient financial assistance, patient education and medication management send to an [facility] retail pharmacy.     Answer:   [place] [facility] [place]    lancets 28 gauge Misc     Sig: use as directed 3 times daily     Dispense:  100 each     Refill:  11    losartan-hydrochlorothiazide 100-25 mg (HYZAAR) 100-25 mg per tablet     Sig: Take 1 tablet by mouth once daily.     Dispense:  90 tablet     Refill:  3     IMPORTANT!  This REPLACES prior  prescriptions for the individual component drugs. DISCONTINUE any existing prescription for the individual component drugs.    metFORMIN (GLUCOPHAGE) 1000 MG tablet     Sig: TAKE 1 TABLET (1,000 MG TOTAL) BY MOUTH 2 (TWO) TIMES DAILY.     Dispense:  180 tablet     Refill:  11    metoprolol succinate (TOPROL-XL) 100 MG 24 hr tablet     Sig: Take 1 tablet (100 mg total) by mouth once daily.     Dispense:  30 tablet     Refill:  5     NOTE DOSE CHANGE. This REPLACES any prior prescription for this drug. DISCONTINUE any prior prescription for this drug.    montelukast (SINGULAIR) 10 mg tablet     Sig: Take 1 tablet (10 mg total) by mouth nightly.     Dispense:  30 tablet     Refill:  11    naproxen (NAPROSYN) 500 MG tablet     Sig: Take 1 tablet (500 mg total) by mouth 2 (two) times daily as needed (FOR FIBROMYALGIA PAIN).     Dispense:  60 tablet     Refill:  3    verapamil (CALAN) 120 MG tablet     Sig: Take 1 tablet (120 mg total) by mouth 2 (two) times daily.     Dispense:  60 tablet     Refill:  11       Medications Discontinued During This Encounter   Medication Reason    gabapentin (NEURONTIN) 300 MG capsule Patient no longer taking    HUMALOG U-100 INSULIN 100 unit/mL injection Patient no longer taking    losartan-hydrochlorothiazide 100-25 mg (HYZAAR) 100-25 mg per tablet Patient no longer taking    lurasidone (LATUDA) 20 mg Tab tablet Patient no longer taking    omega-3 acid ethyl esters (LOVAZA) 1 gram capsule Patient no longer taking    mirtazapine (REMERON) 30 MG tablet Dose adjustment    losartan (COZAAR) 100 MG tablet Alternate therapy    hydroCHLOROthiazide (HYDRODIURIL) 25 MG tablet Alternate therapy    ergocalciferol (ERGOCALCIFEROL) 50,000 unit Cap Reorder    naproxen (NAPROSYN) 500 MG tablet Reorder    verapamil (VERELAN) 180 MG C24P Patient no longer taking    pravastatin (PRAVACHOL) 80 MG tablet Alternate therapy    atorvastatin (LIPITOR) 40 MG tablet Patient no longer taking     "blood-glucose meter (TRUE METRIX AIR GLUCOSE METER) kit     ONETOUCH VERIO Strp Alternate therapy    lancets (TRUEPLUS LANCETS) 33 gauge Misc Alternate therapy    ADVAIR DISKUS 250-50 mcg/dose diskus inhaler Reorder    montelukast (SINGULAIR) 10 mg tablet Reorder    albuterol 90 mcg/actuation inhaler Reorder    metFORMIN (GLUCOPHAGE) 1000 MG tablet Reorder    metoprolol succinate (TOPROL-XL) 50 MG 24 hr tablet Reorder    empagliflozin (JARDIANCE) 10 mg Tab Reorder        Follow up in about 1 week (around 5/9/2019) for re-evaluate problem(s) discussed today.           REVIEW OF SYSTEMS  ENDOCRINE: No polyuria or polydipsia reported.   CARDIOVASCULAR: No angina or orthopnea reported.     PHYSICAL EXAM  Vitals:    05/02/19 1026   BP: 118/78   BP Location: Right arm   Patient Position: Sitting   BP Method: Large (Manual)   Pulse: 81   Temp: 97.6 °F (36.4 °C)   TempSrc: Tympanic   SpO2: 97%   Weight: 89 kg (196 lb 3.4 oz)   Height: 4' 8" (1.422 m)     CONSTITUTIONAL: Vital signs noted. No apparent distress. Does not appear acutely ill or septic. Appears adequately hydrated.  HEENT: External ENT grossly unremarkable. Hearing grossly intact. Oropharynx moist.  PULM: Lungs clear. Breathing unlabored.  HEART: Auscultation reveals regular rate and rhythm without murmur, gallop or rub.  DERM: Skin warm and moist with normal turgor.  NEURO: There are no gross focal motor deficits or gross deficits of cranial nerves III-XII.  PSYCHIATRIC: Alert and conversant. Mood grossly neutral. Judgment and insight not grossly compromised.     TOTAL TIME evaluating and managing this patient for this encounter exceeded 25 minutes, the majority spent counseling and coordinating care for the listed diagnoses.     Documentation entered by me for this encounter may have been done in part using speech-recognition technology. Although I have made an effort to ensure accuracy, "sound like" errors may exist and should be interpreted in " context. CHRSITIANE Beach MD

## 2019-05-03 ENCOUNTER — OFFICE VISIT (OUTPATIENT)
Dept: PULMONOLOGY | Facility: CLINIC | Age: 49
End: 2019-05-03
Payer: MEDICAID

## 2019-05-03 VITALS
DIASTOLIC BLOOD PRESSURE: 80 MMHG | BODY MASS INDEX: 44.49 KG/M2 | RESPIRATION RATE: 17 BRPM | WEIGHT: 197.75 LBS | HEIGHT: 56 IN | OXYGEN SATURATION: 96 % | HEART RATE: 92 BPM | SYSTOLIC BLOOD PRESSURE: 120 MMHG

## 2019-05-03 DIAGNOSIS — G47.33 OBSTRUCTIVE SLEEP APNEA: Primary | Chronic | ICD-10-CM

## 2019-05-03 DIAGNOSIS — R03.0 ELEVATED BLOOD PRESSURE, SITUATIONAL: ICD-10-CM

## 2019-05-03 DIAGNOSIS — I10 ESSENTIAL HYPERTENSION: Chronic | ICD-10-CM

## 2019-05-03 DIAGNOSIS — J45.30 MILD PERSISTENT ASTHMA WITHOUT COMPLICATION: Chronic | ICD-10-CM

## 2019-05-03 PROCEDURE — 99215 OFFICE O/P EST HI 40 MIN: CPT | Mod: S$PBB,,, | Performed by: INTERNAL MEDICINE

## 2019-05-03 PROCEDURE — 99999 PR PBB SHADOW E&M-EST. PATIENT-LVL IV: ICD-10-PCS | Mod: PBBFAC,,, | Performed by: INTERNAL MEDICINE

## 2019-05-03 PROCEDURE — 99215 PR OFFICE/OUTPT VISIT, EST, LEVL V, 40-54 MIN: ICD-10-PCS | Mod: S$PBB,,, | Performed by: INTERNAL MEDICINE

## 2019-05-03 PROCEDURE — 99214 OFFICE O/P EST MOD 30 MIN: CPT | Mod: PBBFAC | Performed by: INTERNAL MEDICINE

## 2019-05-03 PROCEDURE — 99999 PR PBB SHADOW E&M-EST. PATIENT-LVL IV: CPT | Mod: PBBFAC,,, | Performed by: INTERNAL MEDICINE

## 2019-05-03 NOTE — LETTER
May 3, 2019      DOMINIK Beach MD  [address]  [place] [address]           [facility]  [address] [place] [address]  Phone: [phone]  Fax: [phone]          Patient: Yolanda Betts   MR Number: [id number]   YOB: 1970   Date of Visit: 5/3/2019       Dear Dr. DOMINIK Beach:    Thank you for referring Yolanda Betts to me for evaluation. Attached you will find relevant portions of my assessment and plan of care.    If you have questions, please do not hesitate to call me. I look forward to following Yolanda Betts along with you.    Sincerely,    Sylvain Rainey MD    Enclosure  CC:  No Recipients    If you would like to receive this communication electronically, please contact [email or web address] or [phone] to request more information on [facility] [employer] access.    For providers and/or their staff who would like to refer a patient to [facility], please contact us through our one-stop-shop provider referral line, [facility], at [phone].    If you feel you have received this communication in error or would no longer like to receive these types of communications, please e-mail [email or web address]

## 2019-05-03 NOTE — PROGRESS NOTES
Subjective:       Patient ID: Yolanda Betts is a 48 y.o. female.    Chief Complaint: Sleep Apnea and Asthma    Ms. Betts is 48 years old  Last seen 2017: recently returned was living in [place] caring for family grandmother now   Blood pressure elevated: on 3 meds: LOSARTAN HCTZ, METOPROLOL, VERAPAMIL  Asthma: ACT score 16.   Was seen 2018 [facility] with asthma exacebation and Influenza  Immunizations current  Medical regime reviewed: Advair Diskus VENTOLIN  HFA and neb albuterol  [place] sleepiness score is 23  Lost CPAP lost in move  Snoring and witnessed apnea'  Comorbidity: HTN, BMI, DM, HLD,   Sleep study ordered    STOP - BANG Questionnaire:     1. Snoring : Do you snore loudly ?    Yes    2. Tired : Do you often feel tired, fatigued, or sleepy during daytime? Yes    3. Observed: Has anyone observed you stop breathing during your sleep?   Yes     4. Blood pressure : Do you have or are you being treated for high blood pressure?   Yes    5. BMI :BMI more than 35 kg/m2?   Yes    6. Age : Age over 50 yr old?   No    7. Neck circumference: Neck circumference greater than 40 cm?   Yes    8. Gender: Gender male?   No    High risk of ALEN: Yes 6        References:   STOP Questionnaire   A Tool to Screen Patients for Obstructive Sleep Apnea: JOANIE Disla.C.P.C., EDMOND Reyna.B.B.S., Tanner Negrete M.D.,Precious Alvarado, Ph.D., Zakiya Cullen M.B.B.S.,_ EDMOND Ramos.Sc.,_ Federica Stoddard M.D., Alden Lechuga F.R.C.P.C.; Anesthesiology 2008; 108:812-21 Copyright © 2008, the American Society of Anesthesiologists, Inc. [employer]       Asthma   There is no shortness of breath, sputum production or wheezing. Pertinent negatives include no chest pain. Her past medical history is significant for asthma.     Answers for HPI/ROS submitted by the patient on 5/3/2019   Asthma  In the past 4 weeks, how much of the time did your asthma keep you from  "getting as much done at work, school, or at home?: some of the time  During the past 4 weeks, how often have you had shortness of breath?: once or twice a week  During the past 4 weeks, how often did your asthma symptoms (Wheezing, coughing, shortness of breath, chest tightness or pain) wake you up at night or earlier that usual in the morning?: 4 or more nights a week  During the past 4 weeks, how often have you used your rescue inhaler or nebulizer medication (such as albuterol)?: 2 or 3 times a week  How would you rate your asthma control during the past 4 weeks?: completely controlled   : 16      Review of Systems   Constitutional: Positive for weight gain.   HENT: Negative.    Eyes: Negative.    Respiratory: Negative for snoring, sputum production, shortness of breath, wheezing, dyspnea on extertion and use of rescue inhaler.    Cardiovascular: Negative for chest pain.   Genitourinary: Negative.    Endocrine: endocrine negative   Musculoskeletal: Negative.    Skin: Negative.    Gastrointestinal: Negative.    Neurological: Negative.    Psychiatric/Behavioral: Negative for sleep disturbance.   All other systems reviewed and are negative.      Objective:       Vitals:    05/03/19 1057 05/03/19 1118   BP: (!) 140/90 120/80   Pulse: 92    Resp: 17    SpO2: 96%    Weight: 89.7 kg (197 lb 12 oz)    Height: 4' 8" (1.422 m)      Physical Exam   Constitutional: She is oriented to person, place, and time. She appears well-developed and well-nourished. She is obese.   HENT:   Head: Normocephalic.   Mouth/Throat: Oropharynx is clear and moist. No oropharyngeal exudate. Mallampati Score: II.   Neck 17"   Neck: Normal range of motion. Neck supple. No tracheal deviation present. No thyromegaly present.   Cardiovascular: Normal rate, regular rhythm and normal heart sounds.   Pulmonary/Chest: Normal expansion, symmetric chest wall expansion, effort normal and breath sounds normal. No respiratory distress. She has no decreased " breath sounds. She has no wheezes. Negative for tactile fremitus.   Abdominal: Soft. Bowel sounds are normal.   Musculoskeletal: Normal range of motion. She exhibits no edema.   Lymphadenopathy:     She has no cervical adenopathy.     She has no axillary adenopathy.   Neurological: She is alert and oriented to person, place, and time. She has normal reflexes. Gait normal.   Skin: Skin is warm and dry.   Psychiatric: She has a normal mood and affect.   Nursing note and vitals reviewed.    Personal Diagnostic Review    Pulmonary function tests:   FEV1: 1.61  (81 % predicted), FVC:  1.92 (79 % predicted), FEV1/FVC:  84, TLC: 3.27 (84 % predicted),   RV/TLVC: 32 (95 % predicted), DLCO: 15.2 (61 % predicted)    PSG   Patient Name: Yolanda Betts Subject Code: [id number] Study Date: 2016  Page 1  Patient Name: Yolanda Betts Date of Report: 16 Date of PS2016  [facility] No.: [id number] : 1970 Time of PS:25:18 PM - 6:01:05 AM  Sex: Female Age: 46 Weight: 214.0 lbs Height: 5 ? 6 ? Type of PSG: Split night  REASONS FOR REFERRAL: Ms Betts is a 46 year old female, referred to Linda Fernandez NP and the SDC by Dr. Vish Stafford based on the patient?s reported loud snoring, observed respiratory pauses in sleep, frequent nocturnal awakenings, dry  mouth, morning headaches, unrefreshing sleep and daytime hypersomnolence. Her [place] Sleepiness Scale score was 22,  clinically significant, and her STOP - BANG score was 6, high risk of ALEN.  STUDY PARAMETERS: This study involved analysis of the patient's sleep pattern while breathing unassisted, and, if laboratory  criteria are met, while breathing with assistance from PAP. The study was performed with a sleep technologist in attendance for  the entire test period, with video monitoring throughout the study, and routine laboratory clinical parameters recorded: NOTE:  The polysomnography electrophysiological record for the patient has been reviewed  in its entirety by Dr. Anand.  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  F51.5 / 307.47 Nightmare Disorder  F51.3 / 307.46 Sleepwalking  Z72.821 / V69.4 Inadequate Sleep Hygiene  G25.81 / 333.99 r/o Restless Legs Syndrome (RLS)  G47.01 / 327.01 r/o Insomnia due to Medical Condition (pain, discomfort)  G47.21 / 327.31 r/o Delayed Sleep - Wake Phase Disorder  G47.419 / 347.00 r/o Narcolepsy without Cataplexy  G47.11 / 327.11 r/o Idiopathic Hypersomnia  G47.52 / 327.42 r/o REM Sleep Behavior Disorder  K21.9 / 530.1 r/o Sleep - Related Gastroesophageal Reflux  Note: Self-report on the SDI and SLEEP - 50 was markedly biased toward positive responses, that sometimes were notably self -  contradictory. Thus the diagnostic and treatment recommendation dependent on those devices might have been over-inclusive,  and validity compromised in some instances..  PRIMARY TREATMENT RECOMMENDATIONS Treat, or refer to [facility].  1. The diagnostic polysomnography revealed a severe obstructive sleep apnea / hypopnea syndrome (A + H Index = 33.9 events  / hr asleep with 18.0 respiratory event - arousals / hr asleep for the study, but no RERAs (respiratory effort - related  arousals). The mean SpO2 value was 93.6 %, moderate, minimum oxygen saturation during sleep was 86.0 %, and waking  baseline SpO2 was 99 %. Persistent, moderately loud snoring was noted.  2. CPAP was initiated at 1:17 am. The titration polysomnography revealed that 6 cm H2O pressure (C - Flex 3, humidity 2), the  most effective pressure most completely tested, was optimal, as it reduced the rate of respiratory events to zero in 1.3 hrs  sleep (0.7 hrs REM sleep). Snoring was soft at all pressures, but was not eliminated at any. Lower pressure  also could be  successful (5 cm A + H I = 0.0, but with no REM sleep in 1.6 hrs sleep), or autoCPAP (4 cm - 20 cm) could be tried if  necessary.  The overall A + H Index was 0.2 / hr asleep, with no respiratory event - related arousals / hr asleep (and no RERAs) for the  titration trial. The mean SpO2 value was 92.6 % throughout the study, with a minimum oxygen saturation during sleep of  87.0 % (waking baseline SpO2 was 94 %).      CXR  FINDINGS:  Lungs are clear.  No effusion.  Cardiomediastinal silhouette and osseous structures are stable in appearance.      Impression       Stable chest radiograph.  No acute findings       .  No flowsheet data found.      Assessment:       Problem List Items Addressed This Visit     Essential hypertension (Chronic)     Stable monitored by PCP         Obstructive sleep apnea - Primary (Chronic)    Relevant Orders    Polysomnogram (CPAP will be added if patient meets diagnostic criteria.)    Mild persistent asthma without complication (Chronic)    Relevant Orders    Complete PFT without bronchodilator - Clinic      Other Visit Diagnoses     Elevated blood pressure, situational            Plan:       Weight loss and exercsie  On 4 BP meds, Treatement for ALEN essential  PSG to reestablish care  Continue Inhaler regime: ADVAIR, CHRIS and Has nebuliser    Follow up in about 8 weeks (around 6/28/2019), or with JIHAN,Asthma score today, PFT, Sleep study, weight loss, for follow up BP reading with PCP.    This note was prepared using voice recognition system and is likely to have sound alike errors that may have been overlooked even after proof reading.  Please call me with any questions    Discussed diagnosis, its evaluation, treatment and usual course. All questions answered.    Thank you for the courtesy of participating in the care of this patient    Sylvain Rainey MD    Asthma phenotypes  TH2:  []  Allergy  []  Exercise induced bronchoconstriction  []  Late onset eosinophilic  []   AERD: Aspirin Exacerbated respiratory disease    Non TH2    []  Obesity associated  []  Smoking associated  []  Smooth muscle assocaited  []  Very late onset Women

## 2019-05-03 NOTE — PATIENT INSTRUCTIONS
Your provider has scheduled you for a sleep study.   You should be receiving a phone call from the sleep lab shortly after your study has been approved by your insurance. Please make sure you have your current phone numbers in the [facility] system. If you do not hear from anyone in the next 10 business days, please call the sleep lab at [phone] to schedule your sleep study. The sleep studies are performed at [facility] seven nights a week.  When you are scheduling your sleep study, they will also make you a follow up appointment with your provider. This follow up appointment will be 10-14 days after your sleep study to review the results. If it is noted that you do have sleep apnea on your initial sleep study, you may receive a call back for a second night study with the CPAP before you come back to the office.

## 2019-05-05 PROBLEM — E11.59 HYPERTENSION COMPLICATING DIABETES: Chronic | Status: ACTIVE | Noted: 2019-05-05

## 2019-05-05 PROBLEM — I15.2 HYPERTENSION COMPLICATING DIABETES: Chronic | Status: ACTIVE | Noted: 2019-05-05

## 2019-05-05 PROBLEM — E11.9 HYPERTENSION COMPLICATING DIABETES: Chronic | Status: ACTIVE | Noted: 2019-05-05

## 2019-05-05 PROBLEM — E11.65 TYPE 2 DIABETES MELLITUS WITH HYPERGLYCEMIA, WITHOUT LONG-TERM CURRENT USE OF INSULIN: Chronic | Status: ACTIVE | Noted: 2017-12-21

## 2019-05-05 PROBLEM — I10 HYPERTENSION COMPLICATING DIABETES: Chronic | Status: ACTIVE | Noted: 2019-05-05

## 2019-05-06 ENCOUNTER — PATIENT OUTREACH (OUTPATIENT)
Dept: OTHER | Facility: OTHER | Age: 49
End: 2019-05-06

## 2019-05-06 NOTE — LETTER
May 6, 2019     Yolandamario Navarrovivi Alpesh  [address] [address] [address]       Dear Yolanda,    Welcome to [facility]! Our goal is to make care effective, proactive and convenient by using data you send us from home to better treat your chronic conditions.          My name is Jany Up, and I am your dedicated Digital Medicine clinician. As an expert in medication management, I will help ensure that the medications you are taking continue to provide the intended benefits and help you reach your goals. You can reach me directly at  [phone] or by sending me a message directly through your [facility] account.      I am Bipin Dyson and I will be your health . My job is to help you identify lifestyle changes to improve your disease control. We will talk about nutrition, exercise, and other ways you may be able to adjust your current habits to better your health. Additionally, we will help ensure you are completing the tests and screenings that are necessary to help manage your conditions. You can reach me directly at [phone] or by sending me a message directly through your [facility] account.    Most importantly, YOU are at the center of this team. Together, we will work to improve your overall health and encourage you to meet your goals for a healthier lifestyle.     What we expect from YOU:  · Please take frequent home blood pressure measurements. We ask that you take at least 1 blood pressure reading per week, but more information will better help us get you know you. Be sure you rest for a few minutes before taking the reading in a quiet, comfortable place.     Be available to receive phone calls or [facility] messages, when appropriate, from your care team. Please let us know if there are any specific days or times that work best for us to reach you via phone.     Complete routine tests and screenings. Dont worry, we will help keep you on track!           What you should  expect from your Digital Medicine Care Team:   We will work with you to create a personalized plan of care and provide you with encouragement and education, including regarding lifestyle changes, that could help you manage your disease states.     We will adjust your current medications, if needed, and continue to monitor your long-term progress.     We will provide you and your physician with monthly progress reports after you have been in the program for more than 30 days.     We will send you reminders through [facility] and text messages to help ensure you do not miss any testing deadlines to help manage your disease states.    You will be able to reach us by phone or through your [facility] account by clicking our names under Care Team on the right side of the home screen.    I look forward to working with you to achieve your blood pressure goals!    We look forward to working with you to help manage your health,    Sincerely,    Your Digital Medicine Team    Please visit our websites to learn more:   · Hypertension: [email or web address]      Remember, we are not available for emergencies. If you have an emergency, please contact your doctors office directly or call [facility] on-call ([phone] or [phone]) or [phone].

## 2019-05-06 NOTE — ASSESSMENT & PLAN NOTE
Last vitamin-D level was over three years ago. She is due for follow-up labs regarding this problem.

## 2019-05-06 NOTE — PROGRESS NOTES
Last 5 Patient Entered Readings                                      Current 30 Day Average: 129/93     Recent Readings 5/2/2019    SBP (mmHg) 129    DBP (mmHg) 93    Pulse 79        Digital Medicine: Health  Introduction    Introduced Ms. Yolanda Betts to Digital Medicine. Discussed health  role and recommended lifestyle modifications.    Lifestyle Assessment:  Current Dietary Habits(i.e. low sodium, food labels, dining out):  Reviewed low sodium diet (<2000 mg/day) and low sodium food options (<140 mg/serv) with patient    Exercise:  Patient is not currently doing any physical activity.  Patient plans to start walking 3x per week for 20-30 minutes per session.    Alcohol/Tobacco:  Patient does not use any tobacco products.  Patient will have 1 glass of wine every other month    Medication Adherence: has been compliant with the medicaiton regimen     Other goals:    Reviewed AHA/AACE recommendations:  Limit sodium intake to <2000mg/day  Perform 150 minutes of physical activity per week    Reviewed the importance of self-monitoring, medication adherence, and that the health  can be used as a resource for lifestyle modifications to help reduce or maintain a healthy lifestyle.  Reviewed that the Digital Medicine team is not available for emergencies and instructed the patient to call 911 or [facility] ([phone] or [phone]) if one arises.    Notes:  Patient just started new job and is working out her schedule.

## 2019-05-06 NOTE — ASSESSMENT & PLAN NOTE
Diabetes Management Status    Statin: Taking  ACE/ARB: Taking    Screening or Prevention Patient's value Goal Complete/Controlled?   HgA1C Testing and Control   Lab Results   Component Value Date    HGBA1C 8.3 (H) 12/22/2017      Annually/Less than 8% No   Lipid profile : 04/25/2019 Annually Yes   LDL control Lab Results   Component Value Date    LDLCALC 40.4 (L) 04/25/2019    Annually/Less than 100 mg/dl  Yes   Nephropathy screening Lab Results   Component Value Date    LABMICR 7.0 04/25/2019     Lab Results   Component Value Date    PROTEINUA Negative 08/26/2017    Annually Yes   Blood pressure BP Readings from Last 1 Encounters:   05/03/19 120/80    Less than 140/90 Yes   Dilated retinal exam : 03/15/2017 Annually No   Foot exam   : 04/25/2019 Annually Yes

## 2019-05-07 DIAGNOSIS — I10 ESSENTIAL HYPERTENSION: Primary | ICD-10-CM

## 2019-05-08 ENCOUNTER — OFFICE VISIT (OUTPATIENT)
Dept: CARDIOLOGY | Facility: CLINIC | Age: 49
End: 2019-05-08
Payer: MEDICAID

## 2019-05-08 ENCOUNTER — CLINICAL SUPPORT (OUTPATIENT)
Dept: CARDIOLOGY | Facility: CLINIC | Age: 49
End: 2019-05-08
Payer: MEDICAID

## 2019-05-08 VITALS
SYSTOLIC BLOOD PRESSURE: 122 MMHG | WEIGHT: 196 LBS | HEIGHT: 56 IN | HEART RATE: 70 BPM | BODY MASS INDEX: 44.09 KG/M2 | DIASTOLIC BLOOD PRESSURE: 76 MMHG

## 2019-05-08 DIAGNOSIS — R00.2 PALPITATIONS: ICD-10-CM

## 2019-05-08 DIAGNOSIS — I10 ESSENTIAL HYPERTENSION: Primary | Chronic | ICD-10-CM

## 2019-05-08 DIAGNOSIS — G43.109 MIGRAINE WITH AURA AND WITHOUT STATUS MIGRAINOSUS, NOT INTRACTABLE: ICD-10-CM

## 2019-05-08 DIAGNOSIS — J45.30 MILD PERSISTENT ASTHMA WITHOUT COMPLICATION: Chronic | ICD-10-CM

## 2019-05-08 DIAGNOSIS — R94.31 ABNORMAL ECG: ICD-10-CM

## 2019-05-08 DIAGNOSIS — I10 ESSENTIAL HYPERTENSION: ICD-10-CM

## 2019-05-08 DIAGNOSIS — E78.1 HYPERTRIGLYCERIDEMIA: Chronic | ICD-10-CM

## 2019-05-08 DIAGNOSIS — E11.42 DIABETIC POLYNEUROPATHY ASSOCIATED WITH TYPE 2 DIABETES MELLITUS: Chronic | ICD-10-CM

## 2019-05-08 DIAGNOSIS — G47.33 OBSTRUCTIVE SLEEP APNEA: Chronic | ICD-10-CM

## 2019-05-08 DIAGNOSIS — F25.0 SCHIZOAFFECTIVE DISORDER, BIPOLAR TYPE: Chronic | ICD-10-CM

## 2019-05-08 DIAGNOSIS — E55.9 VITAMIN D DEFICIENCY: Chronic | ICD-10-CM

## 2019-05-08 DIAGNOSIS — E11.65 TYPE 2 DIABETES MELLITUS WITH HYPERGLYCEMIA, WITHOUT LONG-TERM CURRENT USE OF INSULIN: Chronic | ICD-10-CM

## 2019-05-08 DIAGNOSIS — F31.9 BIPOLAR 1 DISORDER: Chronic | ICD-10-CM

## 2019-05-08 DIAGNOSIS — R94.2 DIFFUSION CAPACITY OF LUNG (DL), DECREASED: Chronic | ICD-10-CM

## 2019-05-08 PROCEDURE — 99214 OFFICE O/P EST MOD 30 MIN: CPT | Mod: S$PBB,25,, | Performed by: INTERNAL MEDICINE

## 2019-05-08 PROCEDURE — 99214 PR OFFICE/OUTPT VISIT, EST, LEVL IV, 30-39 MIN: ICD-10-PCS | Mod: S$PBB,25,, | Performed by: INTERNAL MEDICINE

## 2019-05-08 PROCEDURE — 93010 EKG 12-LEAD: ICD-10-PCS | Mod: S$PBB,,, | Performed by: INTERNAL MEDICINE

## 2019-05-08 PROCEDURE — 99999 PR PBB SHADOW E&M-EST. PATIENT-LVL III: ICD-10-PCS | Mod: PBBFAC,,, | Performed by: INTERNAL MEDICINE

## 2019-05-08 PROCEDURE — 99999 PR PBB SHADOW E&M-EST. PATIENT-LVL III: CPT | Mod: PBBFAC,,, | Performed by: INTERNAL MEDICINE

## 2019-05-08 PROCEDURE — 93010 ELECTROCARDIOGRAM REPORT: CPT | Mod: S$PBB,,, | Performed by: INTERNAL MEDICINE

## 2019-05-08 PROCEDURE — 99213 OFFICE O/P EST LOW 20 MIN: CPT | Mod: PBBFAC,25 | Performed by: INTERNAL MEDICINE

## 2019-05-08 PROCEDURE — 93005 ELECTROCARDIOGRAM TRACING: CPT | Mod: PBBFAC | Performed by: INTERNAL MEDICINE

## 2019-05-08 NOTE — LETTER
May 8, 2019      DOMINIK Beach MD  [address]  [place] [address]           [facility]  [address] [place] [address]  Phone: [phone]  Fax: [phone]          Patient: Yolanda Betts   MR Number: [id number]   YOB: 1970   Date of Visit: 5/8/2019       Dear Dr. DOMINIK Beach:    Thank you for referring Yolanda Betts to me for evaluation. Attached you will find relevant portions of my assessment and plan of care.    If you have questions, please do not hesitate to call me. I look forward to following Yolanda Betts along with you.    Sincerely,    Ermias Mccormick MD    Enclosure  CC:  No Recipients    If you would like to receive this communication electronically, please contact [email or web address] or [phone] to request more information on [email or web address] Link access.    For providers and/or their staff who would like to refer a patient to [facility], please contact us through our one-stop-shop provider referral line, [facility], at [phone].    If you feel you have received this communication in error or would no longer like to receive these types of communications, please e-mail [email or web address]

## 2019-05-08 NOTE — PROGRESS NOTES
"Subjective:   Patient ID:  Yolanda Betts is a 48 y.o. female who presents for evaluation of Hypertension (elevated b/p)      HPI   Ms. Betts is a 48 year old female patient whose current medical conditions include DM type II, anemia, HTN, schizoaffective disorder, "chronic tachycardia", ALEN, and obesity who presents today to establish care. Patient previously seen by Dr. Sanders in 2016. Recently moved back to [place] from [place]. Reports issues with fluctuating BP. Tends to stay at 140s/90's, higher in the evening. No real associated symptoms. No chest pain or SOB. Occasional palpitations. No lightheadedness, dizziness, near syncope, or syncope. No s/s of CHF. BP controlled today in clinic. Patient admits she needs to be better with her salt intake. Has ALEN but not wearing CPAP as she lost her machine when she moved. EKG today shows NSR, non-specific T wave abnormality. Reviewed records-stress test  at outside facility negative for ischemia.     Would benefit from endocrinology appt.     Past Medical History:   Diagnosis Date    Anemia     Anxiety     Arthritis     Asthma 10/11/2016    Bipolar 1 disorder     Diabetes mellitus, type 2     GERD (gastroesophageal reflux disease)     Hypertension     Hypertension complicating diabetes 2019    Mild persistent asthma without complication 10/11/2016    Obstructive sleep apnea     Schizoaffective disorder, bipolar type 2019    Type 2 diabetes mellitus with hyperglycemia, without long-term current use of insulin 2017       Past Surgical History:   Procedure Laterality Date    BELT ABDOMINOPLASTY      BREAST SURGERY Bilateral     Reduction     SECTION      X 2    COLONOSCOPY      COLONOSCOPY N/A 2018    [facility]    colonoscopy for iron deficiency anemia N/A 2018    [facility] at [facility]    ESOPHAGOGASTRODUODENOSCOPY (EGD) N/A 2018    " [facility]    HYSTERECTOMY      MOUTH SURGERY  1996    TUBAL LIGATION         Social History     Tobacco Use    Smoking status: Never Smoker    Smokeless tobacco: Never Used   Substance Use Topics    Alcohol use: Yes     Alcohol/week: 0.0 oz     Comment: socially    Drug use: No       Family History   Problem Relation Age of Onset    Diabetes Mother     Hyperlipidemia Mother     Hypertension Mother     Asthma Mother     COPD Mother     Glaucoma Mother     Thyroid disease Mother     Hypertension Father     Hyperlipidemia Father     Cancer Father         Brain, lung, liver, kidney    Heart disease Maternal Grandmother     Hyperlipidemia Maternal Grandmother     Hypertension Maternal Grandmother     Cataracts Maternal Grandmother     Diabetes Maternal Grandmother     Heart disease Maternal Grandfather     Hyperlipidemia Maternal Grandfather     Hypertension Maternal Grandfather     Glaucoma Maternal Grandfather     Cancer Maternal Grandfather     Cataracts Maternal Grandfather     Macular degeneration Maternal Grandfather     Diabetes Maternal Grandfather     Heart disease Paternal Grandmother     Hyperlipidemia Paternal Grandmother     Hypertension Paternal Grandmother     Cataracts Paternal Grandmother     Heart disease Paternal Grandfather     Hyperlipidemia Paternal Grandfather     Hypertension Paternal Grandfather     Cataracts Paternal Grandfather     Breast cancer Maternal Cousin     Breast cancer Maternal Cousin     Breast cancer Maternal Cousin     Breast cancer Maternal Cousin        Current Outpatient Medications   Medication Sig    albuterol (PROVENTIL/VENTOLIN HFA) 90 mcg/actuation inhaler Inhale 2 puffs into the lungs every 4 (four) hours as needed.    alprazolam (XANAX) 1 MG tablet Take 1 mg by mouth 3 (three) times daily as needed.    aspirin (ECOTRIN) 81 MG EC tablet Take 81 mg by mouth once daily.    atorvastatin  "(LIPITOR) 20 MG tablet Take 1 tablet (20 mg total) by mouth every evening.    duloxetine (CYMBALTA) 60 MG capsule Take 60 mg by mouth 2 (two) times daily.    empagliflozin (JARDIANCE) 10 mg Tab Take 1 tablet (10 mg) by mouth every morning.    ergocalciferol (ERGOCALCIFEROL) 50,000 unit Cap Take 1 capsule (50,000 Units total) by mouth every 7 days. for 12 doses    fish oil-omega-3 fatty acids 300-1,000 mg capsule Take 1 capsule by mouth once daily.    fluticasone-salmeterol diskus inhaler 250-50 mcg INHALE 1 PUFF INTO THE LUNGS 2 (TWO) TIMES DAILY *RINSE MOUTH AFTER EACH USE*    LINZESS 145 mcg Cap capsule Take 145 mcg by mouth as needed.     losartan-hydrochlorothiazide 100-25 mg (HYZAAR) 100-25 mg per tablet Take 1 tablet by mouth once daily.    lurasidone (LATUDA) 60 mg Tab tablet Take 60 mg by mouth once daily.    metFORMIN (GLUCOPHAGE) 1000 MG tablet TAKE 1 TABLET (1,000 MG TOTAL) BY MOUTH 2 (TWO) TIMES DAILY.    metoprolol succinate (TOPROL-XL) 100 MG 24 hr tablet Take 1 tablet (100 mg total) by mouth once daily.    mirtazapine (REMERON) 45 MG tablet Take 45 mg by mouth every evening.    montelukast (SINGULAIR) 10 mg tablet Take 1 tablet (10 mg total) by mouth nightly.    MULTIVIT,CALC,MINS/IRON/FOLIC (DAILY MULTIPLE FOR WOMEN ORAL) Take 1 tablet by mouth once daily.    naproxen (NAPROSYN) 500 MG tablet Take 1 tablet (500 mg total) by mouth 2 (two) times daily as needed (FOR FIBROMYALGIA PAIN).    nitroGLYCERIN (NITROSTAT) 0.4 MG SL tablet Dissolve 1 tablet under the tongue every 5 minutes as needed, up to 3 times. If chest pain persists, call 911.    promethazine (PHENERGAN) 25 MG tablet Take 1 tablet (25 mg total) by mouth every 4 to 6 hours as needed for Nausea. sedating    verapamil (CALAN) 120 MG tablet Take 1 tablet (120 mg total) by mouth 2 (two) times daily.    zolpidem (AMBIEN) 10 mg Tab Take 10 mg by mouth nightly as needed.    BD INSULIN PEN NEEDLE UF MINI 31 gauge x 3/16" Ndle USE " "TO TEST TWICE A DAY    BD INSULIN SYRINGE ULTRA-FINE 1/2 mL 30 gauge x 1/2" Syrg     blood sugar diagnostic Strp Check blood glucose 3 times daily as directed and as needed (dispense insurance preferred brand or patient choice)    blood-glucose meter Misc use 3 times daily as directed    [START ON 7/25/2019] cholecalciferol, vitamin D3, (VITAMIN D3) 2,000 unit Tab Take 1 tablet (2,000 Units total) by mouth once daily.    insulin detemir U-100 (LEVEMIR FLEXTOUCH) 100 unit/mL (3 mL) SubQ InPn pen Inject 70 Units into the skin every evening.    lancets 28 gauge Misc use as directed 3 times daily     No current facility-administered medications for this visit.      Current Outpatient Medications on File Prior to Visit   Medication Sig    albuterol (PROVENTIL/VENTOLIN HFA) 90 mcg/actuation inhaler Inhale 2 puffs into the lungs every 4 (four) hours as needed.    alprazolam (XANAX) 1 MG tablet Take 1 mg by mouth 3 (three) times daily as needed.    aspirin (ECOTRIN) 81 MG EC tablet Take 81 mg by mouth once daily.    atorvastatin (LIPITOR) 20 MG tablet Take 1 tablet (20 mg total) by mouth every evening.    duloxetine (CYMBALTA) 60 MG capsule Take 60 mg by mouth 2 (two) times daily.    empagliflozin (JARDIANCE) 10 mg Tab Take 1 tablet (10 mg) by mouth every morning.    ergocalciferol (ERGOCALCIFEROL) 50,000 unit Cap Take 1 capsule (50,000 Units total) by mouth every 7 days. for 12 doses    fish oil-omega-3 fatty acids 300-1,000 mg capsule Take 1 capsule by mouth once daily.    fluticasone-salmeterol diskus inhaler 250-50 mcg INHALE 1 PUFF INTO THE LUNGS 2 (TWO) TIMES DAILY *RINSE MOUTH AFTER EACH USE*    LINZESS 145 mcg Cap capsule Take 145 mcg by mouth as needed.     losartan-hydrochlorothiazide 100-25 mg (HYZAAR) 100-25 mg per tablet Take 1 tablet by mouth once daily.    lurasidone (LATUDA) 60 mg Tab tablet Take 60 mg by mouth once daily.    metFORMIN (GLUCOPHAGE) 1000 MG tablet TAKE 1 TABLET (1,000 MG " "TOTAL) BY MOUTH 2 (TWO) TIMES DAILY.    metoprolol succinate (TOPROL-XL) 100 MG 24 hr tablet Take 1 tablet (100 mg total) by mouth once daily.    mirtazapine (REMERON) 45 MG tablet Take 45 mg by mouth every evening.    montelukast (SINGULAIR) 10 mg tablet Take 1 tablet (10 mg total) by mouth nightly.    MULTIVIT,CALC,MINS/IRON/FOLIC (DAILY MULTIPLE FOR WOMEN ORAL) Take 1 tablet by mouth once daily.    naproxen (NAPROSYN) 500 MG tablet Take 1 tablet (500 mg total) by mouth 2 (two) times daily as needed (FOR FIBROMYALGIA PAIN).    nitroGLYCERIN (NITROSTAT) 0.4 MG SL tablet Dissolve 1 tablet under the tongue every 5 minutes as needed, up to 3 times. If chest pain persists, call 911.    promethazine (PHENERGAN) 25 MG tablet Take 1 tablet (25 mg total) by mouth every 4 to 6 hours as needed for Nausea. sedating    verapamil (CALAN) 120 MG tablet Take 1 tablet (120 mg total) by mouth 2 (two) times daily.    zolpidem (AMBIEN) 10 mg Tab Take 10 mg by mouth nightly as needed.    BD INSULIN PEN NEEDLE UF MINI 31 gauge x 3/16" Ndle USE TO TEST TWICE A DAY    BD INSULIN SYRINGE ULTRA-FINE 1/2 mL 30 gauge x 1/2" Syrg     blood sugar diagnostic Strp Check blood glucose 3 times daily as directed and as needed (dispense insurance preferred brand or patient choice)    blood-glucose meter Misc use 3 times daily as directed    [START ON 7/25/2019] cholecalciferol, vitamin D3, (VITAMIN D3) 2,000 unit Tab Take 1 tablet (2,000 Units total) by mouth once daily.    insulin detemir U-100 (LEVEMIR FLEXTOUCH) 100 unit/mL (3 mL) SubQ InPn pen Inject 70 Units into the skin every evening.    lancets 28 gauge Misc use as directed 3 times daily     No current facility-administered medications on file prior to visit.        Review of patient's allergies indicates:   Allergen Reactions    Lortab [hydrocodone-acetaminophen] Itching    Neuromuscular blockers, steroidal      some    Latex, natural rubber Rash    Morphine Rash     " itching    Seconal [secobarbital sodium] Rash     itching    Tylox [oxycodone-acetaminophen] Rash       Review of Systems   Constitution: Negative for diaphoresis, malaise/fatigue and weight gain.   HENT: Negative for hoarse voice.    Eyes: Negative for double vision and visual disturbance.   Cardiovascular: Positive for palpitations. Negative for chest pain, claudication, cyanosis, dyspnea on exertion, irregular heartbeat, leg swelling, near-syncope, orthopnea, paroxysmal nocturnal dyspnea and syncope.   Respiratory: Negative for cough, hemoptysis, shortness of breath and snoring.    Hematologic/Lymphatic: Negative for bleeding problem. Does not bruise/bleed easily.   Skin: Negative for color change and poor wound healing.   Musculoskeletal: Negative for muscle cramps, muscle weakness and myalgias.   Gastrointestinal: Negative for bloating, abdominal pain, change in bowel habit, diarrhea, heartburn, hematemesis, hematochezia, melena and nausea.   Neurological: Negative for excessive daytime sleepiness, dizziness, headaches, light-headedness, loss of balance, numbness and weakness.   Psychiatric/Behavioral: Negative for memory loss. The patient does not have insomnia.    Allergic/Immunologic: Negative for hives.       Objective:   Physical Exam   Constitutional: She is oriented to person, place, and time. She appears well-developed and well-nourished. She does not appear ill. No distress.   HENT:   Head: Normocephalic and atraumatic.   Eyes: Pupils are equal, round, and reactive to light. EOM are normal. No scleral icterus.   Neck: Normal range of motion. Neck supple. Normal carotid pulses, no hepatojugular reflux and no JVD present. Carotid bruit is not present.   Cardiovascular: Normal rate, regular rhythm, S1 normal, S2 normal, normal heart sounds and normal pulses.   No murmur heard.  Pulmonary/Chest: Effort normal and breath sounds normal. No respiratory distress. She has no wheezes. She has no rhonchi. She  "has no rales. She exhibits no tenderness.   Abdominal: Soft. Normal appearance, normal aorta and bowel sounds are normal. She exhibits no abdominal bruit, no ascites and no pulsatile midline mass. There is no hepatomegaly. There is no tenderness.   Musculoskeletal: She exhibits no edema.        Right shoulder: She exhibits no deformity.   Neurological: She is alert and oriented to person, place, and time. She has normal strength. No cranial nerve deficit. Coordination normal.   Skin: Skin is warm and dry. No rash noted. No cyanosis or erythema. Nails show no clubbing.   Psychiatric: She has a normal mood and affect. Her speech is normal and behavior is normal. Thought content normal.   Nursing note and vitals reviewed.    Vitals:    05/08/19 1441   BP: 122/76   Patient Position: Sitting   BP Method: Small (Manual)   Pulse: 70   Weight: 88.9 kg (196 lb)   Height: 4' 8" (1.422 m)     Lab Results   Component Value Date    CHOL 142 04/25/2019    CHOL 102 (L) 12/22/2017    CHOL 128 03/22/2016     Lab Results   Component Value Date    HDL 36 (L) 04/25/2019    HDL 43 12/22/2017    HDL 46 03/22/2016     Lab Results   Component Value Date    LDLCALC 40.4 (L) 04/25/2019    LDLCALC 33.6 (L) 12/22/2017    LDLCALC 47.4 (L) 03/22/2016     Lab Results   Component Value Date    TRIG 328 (H) 04/25/2019    TRIG 127 12/22/2017    TRIG 173 (H) 03/22/2016     Lab Results   Component Value Date    CHOLHDL 25.4 04/25/2019    CHOLHDL 42.2 12/22/2017    CHOLHDL 35.9 03/22/2016       Chemistry        Component Value Date/Time     12/22/2017 1026    K 3.9 12/22/2017 1026     12/22/2017 1026    CO2 27 12/22/2017 1026    BUN 8 12/22/2017 1026    CREATININE 0.7 12/22/2017 1026     (H) 12/22/2017 1026        Component Value Date/Time    CALCIUM 9.5 12/22/2017 1026    ALKPHOS 89 12/22/2017 1026    AST 45 (H) 12/22/2017 1026    ALT 62 (H) 12/22/2017 1026    BILITOT 0.4 12/22/2017 1026    ESTGFRAFRICA >60 12/22/2017 1026    " EGFRNONAA >60 12/22/2017 1026          Lab Results   Component Value Date    TSH 1.855 12/22/2017     Lab Results   Component Value Date    INR 0.9 11/23/2016     Lab Results   Component Value Date    WBC 7.29 12/22/2017    HGB 11.5 (L) 12/22/2017    HCT 36.5 (L) 12/22/2017    MCV 86 12/22/2017     12/22/2017     BNP  @LABRCNTIP(BNP,BNPTRIAGEBLO)@  CrCl cannot be calculated (Patient's most recent lab result is older than the maximum 7 days allowed.).  Assessment:     1. Essential hypertension    2. Obstructive sleep apnea    3. Diabetic polyneuropathy associated with type 2 diabetes mellitus    4. Mild persistent asthma without complication    5. Bipolar 1 disorder    6. Diffusion capacity of lung (dl), decreased    7. Hypertriglyceridemia    8. Type 2 diabetes mellitus with hyperglycemia, without long-term current use of insulin    9. Schizoaffective disorder, bipolar type    10. Vitamin D deficiency    11. Migraine with aura and without status migrainosus, not intractable    12. Abnormal ECG    13. BMI 40.0-44.9, adult      Presents to re-establish care. BP fluctuating due to salt indiscretion and untreated ALEN. Needs those issues addressed, she was counseled. Refer to endocrinology given DM history. Continue same meds. Check echo and Holter.   Plan:   -Continue same cardiac meds  -Low salt diet; treat ALEN  -Endocrinology referral  -Echo, Holter, phone review and decide on f/u

## 2019-05-10 ENCOUNTER — INITIAL CONSULT (OUTPATIENT)
Dept: RHEUMATOLOGY | Facility: CLINIC | Age: 49
End: 2019-05-10
Payer: MEDICAID

## 2019-05-10 VITALS
HEIGHT: 56 IN | HEART RATE: 79 BPM | DIASTOLIC BLOOD PRESSURE: 89 MMHG | BODY MASS INDEX: 44.94 KG/M2 | SYSTOLIC BLOOD PRESSURE: 137 MMHG | WEIGHT: 199.75 LBS

## 2019-05-10 DIAGNOSIS — E66.01 MORBID OBESITY: ICD-10-CM

## 2019-05-10 DIAGNOSIS — M79.7 FIBROMYALGIA: Chronic | ICD-10-CM

## 2019-05-10 DIAGNOSIS — M19.90 OSTEOARTHRITIS, UNSPECIFIED OSTEOARTHRITIS TYPE, UNSPECIFIED SITE: Primary | ICD-10-CM

## 2019-05-10 DIAGNOSIS — Z71.89 COUNSELING ON HEALTH PROMOTION AND DISEASE PREVENTION: ICD-10-CM

## 2019-05-10 DIAGNOSIS — M15.9 PRIMARY OSTEOARTHRITIS INVOLVING MULTIPLE JOINTS: ICD-10-CM

## 2019-05-10 DIAGNOSIS — E11.65 TYPE 2 DIABETES MELLITUS WITH HYPERGLYCEMIA, WITHOUT LONG-TERM CURRENT USE OF INSULIN: Chronic | ICD-10-CM

## 2019-05-10 PROCEDURE — 99214 OFFICE O/P EST MOD 30 MIN: CPT | Mod: PBBFAC | Performed by: INTERNAL MEDICINE

## 2019-05-10 PROCEDURE — 99999 PR PBB SHADOW E&M-EST. PATIENT-LVL IV: CPT | Mod: PBBFAC,,, | Performed by: INTERNAL MEDICINE

## 2019-05-10 PROCEDURE — 99999 PR PBB SHADOW E&M-EST. PATIENT-LVL IV: ICD-10-PCS | Mod: PBBFAC,,, | Performed by: INTERNAL MEDICINE

## 2019-05-10 PROCEDURE — 99215 OFFICE O/P EST HI 40 MIN: CPT | Mod: S$PBB,,, | Performed by: INTERNAL MEDICINE

## 2019-05-10 PROCEDURE — 99215 PR OFFICE/OUTPT VISIT, EST, LEVL V, 40-54 MIN: ICD-10-PCS | Mod: S$PBB,,, | Performed by: INTERNAL MEDICINE

## 2019-05-10 RX ORDER — GABAPENTIN 100 MG/1
100 CAPSULE ORAL 3 TIMES DAILY
Qty: 90 CAPSULE | Refills: 3 | Status: SHIPPED | OUTPATIENT
Start: 2019-05-10 | End: 2019-09-10

## 2019-05-10 NOTE — LETTER
May 10, 2019      DOMINIK Beach MD  [address]  [place] [address]           [facility]  [address] [place] [address]  Phone: [phone]  Fax: [phone]          Patient: Yolanda Betts   MR Number: [id number]   YOB: 1970   Date of Visit: 5/10/2019       Dear Dr. DOMINIK Beach:    Thank you for referring Yolanda Betts to me for evaluation. Attached you will find relevant portions of my assessment and plan of care.    If you have questions, please do not hesitate to call me. I look forward to following Yolanda Betts along with you.    Sincerely,    Abilio Gibbs MD    Enclosure  CC:  No Recipients    If you would like to receive this communication electronically, please contact [email or web address] or [phone] to request more information on [employer] Link access.    For providers and/or their staff who would like to refer a patient to [facility], please contact us through our one-stop-shop provider referral line, [facility], at [phone].    If you feel you have received this communication in error or would no longer like to receive these types of communications, please e-mail [email or web address]

## 2019-05-10 NOTE — PROGRESS NOTES
RHEUMATOLOGY OUTPATIENT CLINIC NOTE    5/10/2019    Attending Rheumatologist: Abilio Gibbs  Primary Care Provider: CHARLA Beach MD   Physician Requesting Consultation: DOMINIK Beach MD  [id number] [place] [address]  Chief Complaint/Reason For Consultation:  Pain (pt reports ongoing pain all over for years; pt rates pain at 9/10 at present. )    Subjective:       HPI  Yolanda Betts is a 48 y.o. Black or  female with fibromyalgia comes to reestablish care.  Patient previously evaluated by Rheumatology.  Mechanical pattern of joint pain, autoimmune workup unrevealing.  History of acute phase reactants elevated, remains with no features of active inflammatory arthritis or connective tissue disease.  Was diagnosed with fibromyalgia.  Her management consisted of duloxetine 60 mg daily and gabapentin 300 mg which was discontinued due to grogginess.  Voices no acute complaints.  Her main concern is generalized arthralgias, worse in her shoulders and knees.  Pain is throbbing, constant, worse in the evening.  Aggravated by activity/prolonged standing.  No response to nonsteroidals.  Also complains of mental fogginess, constant fatigue and feeling tired.  Denies any Raynaud's phenomenon, rash or photosensitivity.  Does not have any joint swelling,  or GI complaints.    Review of Systems   Constitutional: Positive for malaise/fatigue. Negative for chills, fever and weight loss.   HENT:        Denies significant eye or mouth sicca symptoms, denies oral ulcers, denies parotid swelling, denies swollen glands.   Eyes: Negative for blurred vision, pain and redness.   Respiratory: Negative for cough, hemoptysis, sputum production and shortness of breath.         History of ALEN, not on CPAP.   Cardiovascular: Negative for chest pain, orthopnea, leg swelling and PND.   Gastrointestinal: Negative for abdominal pain, blood in stool, constipation, diarrhea and heartburn.         Denies dysphagia.   Genitourinary: Negative for dysuria and hematuria.        Denies genital ulcers or sicca symptoms, denies foaming of the urine, denies nephrolithiasis.   Musculoskeletal: Positive for back pain (No inflammatory or alarm features.), joint pain (Mechanical pattern.) and neck pain. Negative for myalgias.   Skin: Negative for rash.        Denies photosensitivity, denies alopecia, denies sclerodactyly, denies Raynaud's phenomenon,denies digital ulcers, no psoriasis, ulcerations, no purpura, denies nodules.   Neurological: Positive for tingling (Hx of neuropathy) and weakness. Negative for sensory change, focal weakness, seizures and headaches.        Denies transient ischemic attack, denies strokes, denies seizures   Endo/Heme/Allergies: Does not bruise/bleed easily.        Denies fetal loss,frequent infections, denies deep vein thrombosis or pulmonary embolism.   Psychiatric/Behavioral: Negative for substance abuse. The patient has insomnia.    All other systems reviewed and are negative.      Chronic comorbid conditions affecting medical decision making today:  Past Medical History:   Diagnosis Date    Anemia     Anxiety     Arthritis     Asthma 10/11/2016    Bipolar 1 disorder     Diabetes mellitus, type 2     GERD (gastroesophageal reflux disease)     Hypertension     Hypertension complicating diabetes 2019    Mild persistent asthma without complication 10/11/2016    Obstructive sleep apnea     Schizoaffective disorder, bipolar type 2019    Type 2 diabetes mellitus with hyperglycemia, without long-term current use of insulin 2017     Past Surgical History:   Procedure Laterality Date    BELT ABDOMINOPLASTY      BREAST SURGERY Bilateral     Reduction     SECTION      X 2    COLONOSCOPY      COLONOSCOPY N/A 2018    [facility]    colonoscopy for iron deficiency anemia N/A 2018    Performed by Frankie JEAN-BAPTISTE  [facility]    ESOPHAGOGASTRODUODENOSCOPY (EGD) N/A 2/27/2018    Performed by [facility] at [facility] END[facility]    HYSTERECTOMY      MOUTH SURGERY  1996    TUBAL LIGATION       Family History   Problem Relation Age of Onset    Diabetes Mother     Hyperlipidemia Mother     Hypertension Mother     Asthma Mother     COPD Mother     Glaucoma Mother     Thyroid disease Mother     Hypertension Father     Hyperlipidemia Father     Cancer Father         Brain, lung, liver, kidney    Heart disease Maternal Grandmother     Hyperlipidemia Maternal Grandmother     Hypertension Maternal Grandmother     Cataracts Maternal Grandmother     Diabetes Maternal Grandmother     Heart disease Maternal Grandfather     Hyperlipidemia Maternal Grandfather     Hypertension Maternal Grandfather     Glaucoma Maternal Grandfather     Cancer Maternal Grandfather     Cataracts Maternal Grandfather     Macular degeneration Maternal Grandfather     Diabetes Maternal Grandfather     Heart disease Paternal Grandmother     Hyperlipidemia Paternal Grandmother     Hypertension Paternal Grandmother     Cataracts Paternal Grandmother     Heart disease Paternal Grandfather     Hyperlipidemia Paternal Grandfather     Hypertension Paternal Grandfather     Cataracts Paternal Grandfather     Breast cancer Maternal Cousin     Breast cancer Maternal Cousin     Breast cancer Maternal Cousin     Breast cancer Maternal Cousin      Social History     Substance and Sexual Activity   Alcohol Use Yes    Alcohol/week: 0.0 oz    Comment: socially     Social History     Tobacco Use   Smoking Status Never Smoker   Smokeless Tobacco Never Used     Social History     Substance and Sexual Activity   Drug Use No       Current Outpatient Medications:     albuterol (PROVENTIL/VENTOLIN HFA) 90 mcg/actuation inhaler, Inhale 2 puffs into the lungs every 4 (four) hours as needed., Disp: 8.5 g, Rfl: 5    alprazolam (XANAX) 1  "MG tablet, Take 1 mg by mouth 3 (three) times daily as needed., Disp: , Rfl:     aspirin (ECOTRIN) 81 MG EC tablet, Take 81 mg by mouth once daily., Disp: , Rfl:     atorvastatin (LIPITOR) 20 MG tablet, Take 1 tablet (20 mg total) by mouth every evening., Disp: 90 tablet, Rfl: 3    BD INSULIN PEN NEEDLE UF MINI 31 gauge x 3/16" Ndle, USE TO TEST TWICE A DAY, Disp: 100 each, Rfl: 3    BD INSULIN SYRINGE ULTRA-FINE 1/2 mL 30 gauge x 1/2" Syrg, , Disp: , Rfl: 0    blood sugar diagnostic Strp, Check blood glucose 3 times daily as directed and as needed (dispense insurance preferred brand or patient choice), Disp: 100 each, Rfl: 11    blood-glucose meter Misc, use 3 times daily as directed, Disp: 1 each, Rfl: 0    [START ON 7/25/2019] cholecalciferol, vitamin D3, (VITAMIN D3) 2,000 unit Tab, Take 1 tablet (2,000 Units total) by mouth once daily., Disp: 30 tablet, Rfl: 33    duloxetine (CYMBALTA) 60 MG capsule, Take 60 mg by mouth 2 (two) times daily., Disp: , Rfl: 2    empagliflozin (JARDIANCE) 10 mg Tab, Take 1 tablet (10 mg) by mouth every morning., Disp: 30 tablet, Rfl: 5    ergocalciferol (ERGOCALCIFEROL) 50,000 unit Cap, Take 1 capsule (50,000 Units total) by mouth every 7 days. for 12 doses, Disp: 12 capsule, Rfl: 0    fish oil-omega-3 fatty acids 300-1,000 mg capsule, Take 1 capsule by mouth once daily., Disp: , Rfl:     fluticasone-salmeterol diskus inhaler 250-50 mcg, INHALE 1 PUFF INTO THE LUNGS 2 (TWO) TIMES DAILY *RINSE MOUTH AFTER EACH USE*, Disp: 60 each, Rfl: 11    insulin detemir U-100 (LEVEMIR FLEXTOUCH) 100 unit/mL (3 mL) SubQ InPn pen, Inject 70 Units into the skin every evening., Disp: 15 mL, Rfl: 1    lancets 28 gauge Misc, use as directed 3 times daily, Disp: 100 each, Rfl: 11    LINZESS 145 mcg Cap capsule, Take 145 mcg by mouth as needed. , Disp: , Rfl: 3    losartan-hydrochlorothiazide 100-25 mg (HYZAAR) 100-25 mg per tablet, Take 1 tablet by mouth once daily., Disp: 90 tablet, " Rfl: 3    lurasidone (LATUDA) 60 mg Tab tablet, Take 60 mg by mouth once daily., Disp: , Rfl:     metFORMIN (GLUCOPHAGE) 1000 MG tablet, TAKE 1 TABLET (1,000 MG TOTAL) BY MOUTH 2 (TWO) TIMES DAILY., Disp: 180 tablet, Rfl: 11    metoprolol succinate (TOPROL-XL) 100 MG 24 hr tablet, Take 1 tablet (100 mg total) by mouth once daily., Disp: 30 tablet, Rfl: 5    mirtazapine (REMERON) 45 MG tablet, Take 45 mg by mouth every evening., Disp: , Rfl:     montelukast (SINGULAIR) 10 mg tablet, Take 1 tablet (10 mg total) by mouth nightly., Disp: 30 tablet, Rfl: 11    MULTIVIT,CALC,MINS/IRON/FOLIC (DAILY MULTIPLE FOR WOMEN ORAL), Take 1 tablet by mouth once daily., Disp: , Rfl:     naproxen (NAPROSYN) 500 MG tablet, Take 1 tablet (500 mg total) by mouth 2 (two) times daily as needed (FOR FIBROMYALGIA PAIN)., Disp: 60 tablet, Rfl: 3    nitroGLYCERIN (NITROSTAT) 0.4 MG SL tablet, Dissolve 1 tablet under the tongue every 5 minutes as needed, up to 3 times. If chest pain persists, call 911., Disp: , Rfl:     promethazine (PHENERGAN) 25 MG tablet, Take 1 tablet (25 mg total) by mouth every 4 to 6 hours as needed for Nausea. sedating, Disp: 20 tablet, Rfl: 0    verapamil (CALAN) 120 MG tablet, Take 1 tablet (120 mg total) by mouth 2 (two) times daily., Disp: 60 tablet, Rfl: 11    zolpidem (AMBIEN) 10 mg Tab, Take 10 mg by mouth nightly as needed., Disp: , Rfl:     gabapentin (NEURONTIN) 100 MG capsule, Take 1 capsule (100 mg total) by mouth 3 (three) times daily., Disp: 90 capsule, Rfl: 3     Objective:         Vitals:    05/10/19 1145   BP: 137/89   Pulse: 79     Physical Exam   Nursing note and vitals reviewed.  Constitutional: She is oriented to person, place, and time and well-developed, well-nourished, and in no distress.   BMI 44.7   HENT:   Head: Normocephalic.   Eyes: Conjunctivae are normal. Pupils are equal, round, and reactive to light.   Absent Episcleritis/scleritis.   Neck: Normal range of motion.    Cardiovascular: Normal rate and intact distal pulses.    Pulmonary/Chest: Effort normal. No respiratory distress.   Abdominal: Soft. She exhibits no distension.   Neurological: She is alert and oriented to person, place, and time. No cranial nerve deficit. Gait normal.   absent sensory deficits  muscle strength 5/5 through.   SLR -, Lhermitte's sign - Spurling's test -   Skin: No rash noted.     No Skin fibrosis, teleangiectasias, rashes, discoid lesions, purpura, skin ulcers, nodules, or livedo reticularis, nail pitting.    Capillaroscopy: normal     Musculoskeletal: Normal range of motion. She exhibits tenderness (Allodynia, trigger points.  AC joints, bicipital tendons, knee joint lines, right anserine bursa.). She exhibits no edema or deformity.   : strong  No synovitis.    AROM: intact  PROM: intact    Pain reported upon rotator cuff maneuvers and Yergason's.  Arm drop test negative.    Devices used by patient: none       Reviewed old and all outside pertinent medical records available.    All lab results personally reviewed and interpreted by me.  Lab Results   Component Value Date    WBC 7.29 12/22/2017    HGB 11.5 (L) 12/22/2017    HCT 36.5 (L) 12/22/2017    MCV 86 12/22/2017    MCH 26.9 (L) 12/22/2017    MCHC 31.5 (L) 12/22/2017    RDW 15.9 (H) 12/22/2017     12/22/2017    MPV 10.0 12/22/2017       Lab Results   Component Value Date     12/22/2017    K 3.9 12/22/2017     12/22/2017    CO2 27 12/22/2017     (H) 12/22/2017    BUN 8 12/22/2017    CALCIUM 9.5 12/22/2017    PROT 7.4 12/22/2017    ALBUMIN 3.7 12/22/2017    BILITOT 0.4 12/22/2017    AST 45 (H) 12/22/2017    ALKPHOS 89 12/22/2017    ALT 62 (H) 12/22/2017       Lab Results   Component Value Date    COLORU Yellow 08/29/2017    APPEARANCEUA Clear 08/26/2017    SPECGRAV 1.020 08/29/2017    PHUR 5 08/29/2017    PROTEINUA Negative 08/26/2017    KETONESU Neg 08/29/2017    LEUKOCYTESUR Negative 08/26/2017    NITRITE Neg  08/29/2017    UROBILINOGEN Norm 08/29/2017       Lab Results   Component Value Date    CRP 28.5 (H) 01/17/2017       Lab Results   Component Value Date    SEDRATE 43 (H) 01/17/2017       Lab Results   Component Value Date    RF <10.0 01/20/2017    SEDRATE 43 (H) 01/17/2017       No components found for: 25OHVITDTOT, 31LPMYSX7, 90VQQGQX5, METHODNOTE    No results found for: URICACID    No components found for: TSPOTTB    Rheum Labs:  ISAAC negative  Rheumatoid factor/CCP negative     Infectious Labs:  n/a     Imaging:  All imaging reviewed and independently  interpreted by me.    MRI lumbar spine January 2017  Degenerative changes L5-S1.    X-ray C-spine January 2017  vertebral bodies demonstrate normal height.  The alignment is within normal limits. The disk space heights are maintained. The C1-C2 articulation is within normal limits. No osseus encroachment noted upon the neural foraminal canals. No prevertebral soft tissue swelling.     ASSESSMENT / PLAN:     Yolanda Betts is a 48 y.o. Black or  female with:      1. Osteoarthritis, unspecified osteoarthritis type, unspecified site  - history of present illness and current findings consistent with rotator cuff/bicipital tendinopathy, degenerative joint/disc disease  - Discussed and recommended exercise (ab non wt-bearing/swimming)  - resting affected joint for brief periods (<12 h)  - Acetaminophen prn -> standing -> NSAIDs short course (if persistent pain)  - Topicals therapy: Capsaicin / NSAIDs   - Ambulatory Referral to Physical/Occupational Therapy    2. Fibromyalgia  - diffuse pain for >3 mos; hx fatigue, sleep disturbance, anxiety, depression; cognitive difficulties, stiffness  - no features of active autoimmune arthritis or connective tissue disease.  Autoimmune workup unrevealing.  - address contributing sleep disturbance/insomnia per PMD  - sleep hygiene, stress management  - resume Gabapentin at lower dose start 100mg qhs and increase  as tolerated  - continue with duloxetine unchanged at this time.  - Counseled on adverse medications reactions: constipation, dryness, sedation, GI upset, serotonin syndrome (except Pregabalin and gabapentin)  - manage expectations, reassurance, Physical activity  - gabapentin (NEURONTIN) 100 MG capsule; Take 1 capsule (100 mg total) by mouth 3 (three) times daily.  Dispense: 90 capsule; Refill: 3    3. Other specified counseling  - over 10 minutes spent regarding below topics:  - Immunization counseling done.  - Weight loss counseling done.  - Nutrition and exercise counseling.  - Regular exercise:  Aerobic and resistance.  - Medication counseling provided.    4. Morbid Obesity  - previously with elevated acute phase reactants.  These may be seen in the setting of obesity.  - would benefit from decreasing at least 10% of body weight.  - recommended goal of losing 1 lb per week.  - Ambulatory consult to Nutrition Services  - ALEN management per PMD.    Follow up in about 4 months (around 9/10/2019).    Method of contact with patient concerns: [facility]    Disclaimer:  This note is prepared using voice recognition software and as such is likely to have errors and has not been proof read. Please contact me for questions.     Time spent: 60 minutes in face to face discussion concerning diagnosis, prognosis, review of lab and test results, benefits of treatment as well as management of disease, counseling of patient and coordination of care between various health care providers.  Greater than half the time spent was used for coordination of care and counseling of patient.    Abilio Gibbs M.D.  Rheumatology Department   [facility]

## 2019-05-10 NOTE — PATIENT INSTRUCTIONS
Managing Fibromyalgia    Fibromyalgia is a chronic illness that causes pain in specific points on the body, stiffness, and fatigue. But it doesnt have to keep you from doing what you enjoy. You can feel better. You and your healthcare provider can work together to develop a plan.  Take medications as directed  You may be given medications to help reduce pain and improve sleep.  Several medications are approved to treat fibromyalgia. Two were originally designed to treat depression. They are duloxetine, and milnacipran. A third, called pregaballin, was developed to treat nerve pain. Other medications include pain relievers such as acetaminophen or stronger narcotics. These may be prescribed short term.  NSAIDs (non-steroidal anti-inflammatory drugs) include aspirin, ibuprofen and naproxen are used to relieve pain.  Get exercise  Gentle exercise can help lessen your pain. Try these tips:  · Choose activities that are gentle on your joints, such as walking, biking, and swimming or other water exercises.  · Dont push yourself too hard. Build up your strength and endurance slowly, over time.  · Stick to it. For the most relief, exercise should become part of your daily life.  Get a good nights rest  To help you get more sleep, try the tips below:  · Sleep only in a bed, not on a couch or chair.  · Dont watch TV, read, or work in bed.  · Go to bed and get up at the same time each day.  · Try to avoid naps.  · Avoid alcohol, caffeine, and tobacco for at least 3 hours before going to bed.  · Avoid fluids in the evening to avoid having to get up to urinate.  Other things you can do  No one knows what causes fibromyalgia. But stress, poor eating habits, and extra weight can make it worse. These tips may help you feel better:  · Eat a balanced diet with plenty of fruits and vegetables, whole grains, lean protein, and low-fat or nonfat dairy products.  · Maintain a healthy weight.  · Learn ways to reduce or manage the  stress in your life.  · Ask your healthcare provider for resources to help you make changes. Or check out the resources below.  Resources  · Arthritis Foundation  www.arthritis.org  · National Fibromyalgia Association  www.fmaware.org  · American Fibromyalgia Syndrome Association  www.afsafund.org  Date Last Reviewed: 2/14/2016 © 2000-2017 [employer]. [address] [place] [address], [place] [address]. All rights reserved. This information is not intended as a substitute for professional medical care. Always follow your healthcare professional's instructions.        Understanding Fibromyalgia     People with fibromyalgia tend to have at least 11 of the 18 tender points shown above.     Fibromyalgia is a condition that causes pain at specific points on the body, stiffness, and fatigue.  What are the symptoms of fibromyalgia?  In most cases, you will have tender points on your body. These points are very sore, especially when touched. Finding several of these points helps your healthcare provider diagnose fibromyalgia.  Along with the tender points, you may have some or all of the following symptoms:  · Constant tiredness (fatigue), even after a full nights sleep (non-restorative sleep)  · A burning or throbbing pain in many parts of the body (this pain may vary during the day)  · Stiffness or aching all over your body  · Numbness or tingling in your arms and legs  · Trouble sleeping  · Bowel problems (bloating, diarrhea, constipation)  · Headaches  · Depression  How is fibromyalgia diagnosed?  There is no lab test to diagnose fibromyalgia. Instead, your healthcare provider will take your health history and examine your joints and muscles. Certain criteria are used when diagnosing fibromyalgia. Symptoms need to be present for at least 3 months. Tests may be done to rule out other conditions with similar symptoms. Then, together you can design a plan to help you manage your symptoms.   How is fibromyalgia  treated?  Several medications are approved to treat fibromyalgia. Two were originally made to treat depression. They are duloxetine, and milnacipran. A third, called pregaballin, was developed to treat nerve pain. Certain medicines used to treat depression have been helpful with fibromyalgia. Other medications include pain relievers such as acetaminophen or stronger narcotics. These may be prescribed short term.  NSAIDs (nonsteroidal anti-inflammatory drugs) including aspirin, ibuprofen, and naproxen are used to relieve pain.  Getting enough sleep, regular exercise, and eating a healthy diet can help manage symptoms. Cognitive behavioral therapy (a form of psychotherapy) can be helpful for fibromyalgia. Some people find alternative treatments such as massage, chiropractic treatments, biofeedback, or acupuncture also help with symptoms.  Date Last Reviewed: 2/14/2016  © 9362-6728 [employer]. [address] [place] [address]. All rights reserved. This information is not intended as a substitute for professional medical care. Always follow your healthcare professional's instructions.        Fibromyalgia  Fibromyalgia is a chronic condition. It causes pain and tenderness in connective tissues. This causes muscle pain. Often, there are also many tender areas throughout the body. Symptoms may also include stiffness and feelings of numbness and tingling. Symptoms may be worse upon waking up. They may increase with poor sleep, heavy activity, cold or damp weather, anxiety, or stress.  People with fibromyalgia often feel tired. They may have trouble sleeping. Other symptoms include morning stiffness, headaches, and painful menstrual periods. Some people have problems with thinking clearly and changes in memory.  The cause of fibromyalgia is not known. Symptoms are similar to that of other diseases. These include rheumatoid arthritis, low thyroid, chronic fatigue syndrome, and Lyme disease. In some  cases, these diseases may occur together.  Fibromyalgia is often treated with medicines. You and your healthcare provider can discuss the medicine that may work best for you. You may have to try more than one medicine or combination of medicines before you find what works for you.  Home care  · If your healthcare provider has prescribed or recommended medicines, take them as directed.  · Rest as needed. Try to get enough sleep. If you have trouble sleeping, discuss this with your healthcare provider.   · Be active. Regular exercise can help manage symptoms. Some options include walking, swimming, and biking. Strengthening exercises may also be helpful. Talk to your healthcare provider about the best ways to be active.  · Follow a healthy diet. Limit caffeine and alcohol. If you smoke, ask your healthcare provider for help to stop.  · Notice how your body reacts to stress. Learn to listen to your body signals. This will help you take action before the stress becomes severe.  · Learn relaxation techniques. Also consider joining a stress reduction program or class.  · Talk to your healthcare provider about trying complementary treatments. These include acupuncture, hypnosis, and biofeedback. Yoga and zak chi may be helpful.  · Ask your healthcare provider about cognitive behavioral therapy (CBT). This type of counseling can help people with fibromyalgia cope better with their illness.  Follow-up care  Follow up with your healthcare provider or as advised by our staff. In many cases, fibromyalgia is best treated with a team approach.  This may involve your primary care provider, a rheumatologist, a physical therapist, and a mental health professional.   For more information: National [employer] of Arthritis and Musculoskeletal and Skin Diseases (NIAMS)  www.niams.nih.gov 798-517-6512  When to seek medical advice  Contact your healthcare provider right away if any of these occur:  · Symptoms getting worse or new symptoms  developing  · You feel hopeless, helpless, or lose interest in day-to-day life  Date Last Reviewed: 3/1/2017  © 5367-2382 [employer]. [address] [place] [address], [place] [address]. All rights reserved. This information is not intended as a substitute for professional medical care. Always follow your healthcare professional's instructions.        Gabapentin capsules or tablets  What is this medicine?  GABAPENTIN (GA ba pen tin) is used to control partial seizures in adults with epilepsy. It is also used to treat certain types of nerve pain.  How should I use this medicine?  Take this medicine by mouth with a glass of water. Follow the directions on the prescription label. You can take it with or without food. If it upsets your stomach, take it with food.Take your medicine at regular intervals. Do not take it more often than directed. Do not stop taking except on your doctor's advice.  If you are directed to break the 600 or 800 mg tablets in half as part of your dose, the extra half tablet should be used for the next dose. If you have not used the extra half tablet within 28 days, it should be thrown away.  A special MedGuide will be given to you by the pharmacist with each prescription and refill. Be sure to read this information carefully each time.  Talk to your pediatrician regarding the use of this medicine in children. Special care may be needed.  What side effects may I notice from receiving this medicine?  Side effects that you should report to your doctor or health care professional as soon as possible:  · allergic reactions like skin rash, itching or hives, swelling of the face, lips, or tongue  · worsening of mood, thoughts or actions of suicide or dying  Side effects that usually do not require medical attention (report to your doctor or health care professional if they continue or are bothersome):  · constipation  · difficulty walking or controlling muscle  movements  · dizziness  · nausea  · slurred speech  · tiredness  · tremors  · weight gain  What may interact with this medicine?  Do not take this medicine with any of the following medications:  · other gabapentin products  This medicine may also interact with the following medications:  · alcohol  · antacids  · antihistamines for allergy, cough and cold  · certain medicines for anxiety or sleep  · certain medicines for depression or psychotic disturbances  · homatropine; hydrocodone  · naproxen  · narcotic medicines (opiates) for pain  · phenothiazines like chlorpromazine, mesoridazine, prochlorperazine, thioridazine  What if I miss a dose?  If you miss a dose, take it as soon as you can. If it is almost time for your next dose, take only that dose. Do not take double or extra doses.  Where should I keep my medicine?  Keep out of reach of children.  This medicine may cause accidental overdose and death if it taken by other adults, children, or pets. Mix any unused medicine with a substance like cat litter or coffee grounds. Then throw the medicine away in a sealed container like a sealed bag or a coffee can with a lid. Do not use the medicine after the expiration date.  Store at room temperature between 15 and 30 degrees C (59 and 86 degrees F).  What should I tell my health care provider before I take this medicine?  They need to know if you have any of these conditions:  · kidney disease  · suicidal thoughts, plans, or attempt; a previous suicide attempt by you or a family member  · an unusual or allergic reaction to gabapentin, other medicines, foods, dyes, or preservatives  · pregnant or trying to get pregnant  · breast-feeding  What should I watch for while using this medicine?  Visit your doctor or health care professional for regular checks on your progress. You may want to keep a record at home of how you feel your condition is responding to treatment. You may want to share this information with your  doctor or health care professional at each visit. You should contact your doctor or health care professional if your seizures get worse or if you have any new types of seizures. Do not stop taking this medicine or any of your seizure medicines unless instructed by your doctor or health care professional. Stopping your medicine suddenly can increase your seizures or their severity.  Wear a medical identification bracelet or chain if you are taking this medicine for seizures, and carry a card that lists all your medications.  You may get drowsy, dizzy, or have blurred vision. Do not drive, use machinery, or do anything that needs mental alertness until you know how this medicine affects you. To reduce dizzy or fainting spells, do not sit or stand up quickly, especially if you are an older patient. Alcohol can increase drowsiness and dizziness. Avoid alcoholic drinks.  Your mouth may get dry. Chewing sugarless gum or sucking hard candy, and drinking plenty of water will help.  The use of this medicine may increase the chance of suicidal thoughts or actions. Pay special attention to how you are responding while on this medicine. Any worsening of mood, or thoughts of suicide or dying should be reported to your health care professional right away.  Women who become pregnant while using this medicine may enroll in the [place] American Antiepileptic Drug Pregnancy Registry by calling 1-972.136.8055. This registry collects information about the safety of antiepileptic drug use during pregnancy.  NOTE:This sheet is a summary. It may not cover all possible information. If you have questions about this medicine, talk to your doctor, pharmacist, or health care provider. Copyright© 2017 [employer]

## 2019-05-13 ENCOUNTER — OFFICE VISIT (OUTPATIENT)
Dept: INTERNAL MEDICINE | Facility: CLINIC | Age: 49
End: 2019-05-13
Payer: MEDICAID

## 2019-05-13 VITALS
SYSTOLIC BLOOD PRESSURE: 144 MMHG | WEIGHT: 201.75 LBS | HEART RATE: 86 BPM | HEIGHT: 56 IN | BODY MASS INDEX: 45.38 KG/M2 | OXYGEN SATURATION: 96 % | TEMPERATURE: 98 F | RESPIRATION RATE: 18 BRPM | DIASTOLIC BLOOD PRESSURE: 82 MMHG

## 2019-05-13 DIAGNOSIS — E11.69 DYSLIPIDEMIA ASSOCIATED WITH TYPE 2 DIABETES MELLITUS: Chronic | ICD-10-CM

## 2019-05-13 DIAGNOSIS — E11.65 TYPE 2 DIABETES MELLITUS WITH HYPERGLYCEMIA, WITHOUT LONG-TERM CURRENT USE OF INSULIN: Primary | Chronic | ICD-10-CM

## 2019-05-13 DIAGNOSIS — F25.0 SCHIZOAFFECTIVE DISORDER, BIPOLAR TYPE: Chronic | ICD-10-CM

## 2019-05-13 DIAGNOSIS — F41.9 ANXIETY: ICD-10-CM

## 2019-05-13 DIAGNOSIS — J30.1 SEASONAL ALLERGIC RHINITIS DUE TO POLLEN: Chronic | ICD-10-CM

## 2019-05-13 DIAGNOSIS — E78.1 HYPERTRIGLYCERIDEMIA: Chronic | ICD-10-CM

## 2019-05-13 DIAGNOSIS — I10 ESSENTIAL HYPERTENSION: Chronic | ICD-10-CM

## 2019-05-13 DIAGNOSIS — E66.01 MORBID OBESITY WITH BODY MASS INDEX (BMI) OF 45.0 TO 49.9 IN ADULT: Chronic | ICD-10-CM

## 2019-05-13 DIAGNOSIS — E11.42 DIABETIC POLYNEUROPATHY ASSOCIATED WITH TYPE 2 DIABETES MELLITUS: Chronic | ICD-10-CM

## 2019-05-13 DIAGNOSIS — G47.33 OBSTRUCTIVE SLEEP APNEA: Chronic | ICD-10-CM

## 2019-05-13 DIAGNOSIS — E11.59 HYPERTENSION COMPLICATING DIABETES: Chronic | ICD-10-CM

## 2019-05-13 DIAGNOSIS — E78.5 DYSLIPIDEMIA ASSOCIATED WITH TYPE 2 DIABETES MELLITUS: Chronic | ICD-10-CM

## 2019-05-13 DIAGNOSIS — F31.9 BIPOLAR 1 DISORDER: Chronic | ICD-10-CM

## 2019-05-13 DIAGNOSIS — I15.2 HYPERTENSION COMPLICATING DIABETES: Chronic | ICD-10-CM

## 2019-05-13 PROCEDURE — 99214 PR OFFICE/OUTPT VISIT, EST, LEVL IV, 30-39 MIN: ICD-10-PCS | Mod: S$PBB,,, | Performed by: FAMILY MEDICINE

## 2019-05-13 PROCEDURE — 99214 OFFICE O/P EST MOD 30 MIN: CPT | Mod: S$PBB,,, | Performed by: FAMILY MEDICINE

## 2019-05-13 PROCEDURE — 99999 PR PBB SHADOW E&M-EST. PATIENT-LVL III: ICD-10-PCS | Mod: PBBFAC,,, | Performed by: FAMILY MEDICINE

## 2019-05-13 PROCEDURE — 99213 OFFICE O/P EST LOW 20 MIN: CPT | Mod: PBBFAC | Performed by: FAMILY MEDICINE

## 2019-05-13 PROCEDURE — 99999 PR PBB SHADOW E&M-EST. PATIENT-LVL III: CPT | Mod: PBBFAC,,, | Performed by: FAMILY MEDICINE

## 2019-05-13 RX ORDER — FLUTICASONE PROPIONATE 50 MCG
2 SPRAY, SUSPENSION (ML) NASAL DAILY
Qty: 16 G | Refills: 11 | Status: SHIPPED | OUTPATIENT
Start: 2019-05-13 | End: 2023-07-19 | Stop reason: SDUPTHER

## 2019-05-13 NOTE — ASSESSMENT & PLAN NOTE
Diabetes Management Status    Statin: Taking  ACE/ARB: Taking    Screening or Prevention Patient's value Goal Complete/Controlled?   HgA1C Testing and Control   Lab Results   Component Value Date    HGBA1C 8.3 (H) 12/22/2017      Annually/Less than 8% No   Lipid profile : 04/25/2019 Annually Yes   LDL control Lab Results   Component Value Date    LDLCALC 40.4 (L) 04/25/2019    Annually/Less than 100 mg/dl  Yes   Nephropathy screening Lab Results   Component Value Date    LABMICR 7.0 04/25/2019     Lab Results   Component Value Date    PROTEINUA Negative 08/26/2017    Annually Yes   Blood pressure BP Readings from Last 1 Encounters:   05/13/19 (!) 144/82    Less than 140/90 No   Dilated retinal exam : 03/15/2017 Annually No   Foot exam   : 04/25/2019 Annually Yes     Future Appointments   Date Time Provider Department Center   5/16/2019  1:00 PM Malka Severino MD [facility] ENDO [facility]   5/17/2019  8:00 AM Alfred Sweeney DPM [facility] POD [facility]   5/22/2019  9:40 AM Toni Holland OD [facility] OPHTHAL [facility]   6/24/2019 12:00 PM Yara Sanchez RD, CDE [facility] DIABETE [facility]

## 2019-05-13 NOTE — ASSESSMENT & PLAN NOTE
Wt Readings from Last 5 Encounters:   05/13/19 91.5 kg (201 lb 11.5 oz)   05/10/19 90.6 kg (199 lb 11.8 oz)   05/08/19 88.9 kg (196 lb)   05/03/19 89.7 kg (197 lb 12 oz)   05/02/19 89.4 kg (197 lb)   Worse. Therapeutic lifestyle changes encouraged. Has endocrinology appointment later this week.

## 2019-05-13 NOTE — PROGRESS NOTES
CHIEF COMPLAINT  Follow-up      HISTORY, ASSESSMENT, and PLAN    1. Type 2 diabetes mellitus with hyperglycemia, without long-term current use of insulin    2. Essential hypertension    3. Hypertension complicating diabetes    4. Dyslipidemia associated with type 2 diabetes mellitus    5. Hypertriglyceridemia    6. Obstructive sleep apnea    7. Schizoaffective disorder, bipolar type    8. Bipolar 1 disorder    9. Anxiety    10. Morbid obesity with body mass index (BMI) of 45.0 to 49.9 in adult    11. Diabetic polyneuropathy associated with type 2 diabetes mellitus    12. Seasonal allergic rhinitis due to pollen      Hypertension is asymptomatic but uncontrolled. She says that her cardiologist wanted to delay adjusting her blood pressure medications until she began treatment for her sleep apnea and had her echocardiogram performed.    She is awaiting sleep apnea evaluation prior to starting CPAP.    She continues to struggle with obesity, in spite of her efforts at therapeutic lifestyle changes. Exacerbating factors include medication side effects.    Bipolar disorder appears compensated/control.     Diabetes is asymptomatic but not well controlled. She has appointment with endocrinology later this week.    Problem List Items Addressed This Visit        Neuro    Diabetic polyneuropathy associated with type 2 diabetes mellitus (Chronic)       Psychiatric    Anxiety    Bipolar 1 disorder (Chronic)    Overview     PSYCHIATRIST: Dr. Sabillon         Schizoaffective disorder, bipolar type (Chronic)    Overview     PSYCHIATRIST: Dr. Sabillon            Cardiac/Vascular    Dyslipidemia associated with type 2 diabetes mellitus (Chronic)    Essential hypertension (Chronic)    Hypertension complicating diabetes (Chronic)    Hypertriglyceridemia (Chronic)       Endocrine    Morbid obesity with body mass index (BMI) of 45.0 to 49.9 in adult (Chronic)    Current Assessment & Plan     Wt Readings from Last 5 Encounters:   05/13/19  91.5 kg (201 lb 11.5 oz)   05/10/19 90.6 kg (199 lb 11.8 oz)   05/08/19 88.9 kg (196 lb)   05/03/19 89.7 kg (197 lb 12 oz)   05/02/19 89.4 kg (197 lb)   Worse. Therapeutic lifestyle changes encouraged. Has endocrinology appointment later this week.         Type 2 diabetes mellitus with hyperglycemia, without long-term current use of insulin - Primary (Chronic)    Current Assessment & Plan     Diabetes Management Status    Statin: Taking  ACE/ARB: Taking    Screening or Prevention Patient's value Goal Complete/Controlled?   HgA1C Testing and Control   Lab Results   Component Value Date    HGBA1C 8.3 (H) 12/22/2017      Annually/Less than 8% No   Lipid profile : 04/25/2019 Annually Yes   LDL control Lab Results   Component Value Date    LDLCALC 40.4 (L) 04/25/2019    Annually/Less than 100 mg/dl  Yes   Nephropathy screening Lab Results   Component Value Date    LABMICR 7.0 04/25/2019     Lab Results   Component Value Date    PROTEINUA Negative 08/26/2017    Annually Yes   Blood pressure BP Readings from Last 1 Encounters:   05/13/19 (!) 144/82    Less than 140/90 No   Dilated retinal exam : 03/15/2017 Annually No   Foot exam   : 04/25/2019 Annually Yes     Future Appointments   Date Time Provider Department Center   5/16/2019  1:00 PM Malka Severino MD [facility] ENDO [facility]   5/17/2019  8:00 AM Alfred Sweeney DPM [facility] POD [facility]   5/22/2019  9:40 AM Toni Holland OD [facility] OPHTHAL [facility]   6/24/2019 12:00 PM Yara Sanchez RD, CDE [facility] DIABETE [facility]                  Other    Obstructive sleep apnea (Chronic)    Seasonal allergic rhinitis due to pollen (Chronic)    Current Assessment & Plan     She describes typical chronic seasonal nasal congestion and rhinorrhea exacerbated by pollen and changes in weather.          Relevant Medications    fluticasone propionate (FLONASE) 50 mcg/actuation nasal spray           Outpatient Medications Prior to Visit   Medication Sig Dispense Refill    albuterol  "(PROVENTIL/VENTOLIN HFA) 90 mcg/actuation inhaler Inhale 2 puffs into the lungs every 4 (four) hours as needed. 8.5 g 5    alprazolam (XANAX) 1 MG tablet Take 1 mg by mouth 3 (three) times daily as needed.      aspirin (ECOTRIN) 81 MG EC tablet Take 81 mg by mouth once daily.      atorvastatin (LIPITOR) 20 MG tablet Take 1 tablet (20 mg total) by mouth every evening. 90 tablet 3    BD INSULIN PEN NEEDLE UF MINI 31 gauge x 3/16" Ndle USE TO TEST TWICE A  each 3    blood sugar diagnostic Strp Check blood glucose 3 times daily as directed and as needed (dispense insurance preferred brand or patient choice) 100 each 11    blood-glucose meter Misc use 3 times daily as directed 1 each 0    [START ON 7/25/2019] cholecalciferol, vitamin D3, (VITAMIN D3) 2,000 unit Tab Take 1 tablet (2,000 Units total) by mouth once daily. 30 tablet 33    duloxetine (CYMBALTA) 60 MG capsule Take 60 mg by mouth 2 (two) times daily.  2    empagliflozin (JARDIANCE) 10 mg Tab Take 1 tablet (10 mg) by mouth every morning. 30 tablet 5    ergocalciferol (ERGOCALCIFEROL) 50,000 unit Cap Take 1 capsule (50,000 Units total) by mouth every 7 days. for 12 doses 12 capsule 0    fish oil-omega-3 fatty acids 300-1,000 mg capsule Take 1 capsule by mouth once daily.      fluticasone-salmeterol diskus inhaler 250-50 mcg INHALE 1 PUFF INTO THE LUNGS 2 (TWO) TIMES DAILY *RINSE MOUTH AFTER EACH USE* 60 each 11    gabapentin (NEURONTIN) 100 MG capsule Take 1 capsule (100 mg total) by mouth 3 (three) times daily. 90 capsule 3    insulin detemir U-100 (LEVEMIR FLEXTOUCH) 100 unit/mL (3 mL) SubQ InPn pen Inject 70 Units into the skin every evening. 15 mL 1    lancets 28 gauge Misc use as directed 3 times daily 100 each 11    LINZESS 145 mcg Cap capsule Take 145 mcg by mouth as needed.   3    losartan-hydrochlorothiazide 100-25 mg (HYZAAR) 100-25 mg per tablet Take 1 tablet by mouth once daily. 90 tablet 3    lurasidone (LATUDA) 60 mg Tab " "tablet Take 60 mg by mouth once daily.      metFORMIN (GLUCOPHAGE) 1000 MG tablet TAKE 1 TABLET (1,000 MG TOTAL) BY MOUTH 2 (TWO) TIMES DAILY. 180 tablet 11    metoprolol succinate (TOPROL-XL) 100 MG 24 hr tablet Take 1 tablet (100 mg total) by mouth once daily. 30 tablet 5    mirtazapine (REMERON) 45 MG tablet Take 45 mg by mouth every evening.      montelukast (SINGULAIR) 10 mg tablet Take 1 tablet (10 mg total) by mouth nightly. 30 tablet 11    MULTIVIT,CALC,MINS/IRON/FOLIC (DAILY MULTIPLE FOR WOMEN ORAL) Take 1 tablet by mouth once daily.      naproxen (NAPROSYN) 500 MG tablet Take 1 tablet (500 mg total) by mouth 2 (two) times daily as needed (FOR FIBROMYALGIA PAIN). 60 tablet 3    nitroGLYCERIN (NITROSTAT) 0.4 MG SL tablet Dissolve 1 tablet under the tongue every 5 minutes as needed, up to 3 times. If chest pain persists, call 911.      promethazine (PHENERGAN) 25 MG tablet Take 1 tablet (25 mg total) by mouth every 4 to 6 hours as needed for Nausea. sedating 20 tablet 0    verapamil (CALAN) 120 MG tablet Take 1 tablet (120 mg total) by mouth 2 (two) times daily. 60 tablet 11    zolpidem (AMBIEN) 10 mg Tab Take 10 mg by mouth nightly as needed.      BD INSULIN SYRINGE ULTRA-FINE 1/2 mL 30 gauge x 1/2" Syrg   0     No facility-administered medications prior to visit.         Medications Ordered This Encounter   Medications    fluticasone propionate (FLONASE) 50 mcg/actuation nasal spray     Si sprays (100 mcg total) by Each Nare route once daily.     Dispense:  1 Bottle     Refill:  11       There are no discontinued medications.     Follow up in about 3 months (around 2019) for re-evaluate problem(s) discussed today.    REVIEW OF SYSTEMS  CARDIOVASCULAR: No angina or orthopnea reported.   ENDOCRINE: No polyuria or polydipsia reported.   PSYCHIATRIC: No mood instability reported.     PHYSICAL EXAM  Vitals:    19 0845   BP: (!) 144/82   BP Location: Right arm   Patient Position: " "Sitting   BP Method: Large (Manual)   Pulse: 86   Resp: 18   Temp: 98 °F (36.7 °C)   TempSrc: Tympanic   SpO2: 96%   Weight: 91.5 kg (201 lb 11.5 oz)   Height: 4' 8" (1.422 m)     CONSTITUTIONAL: Vital signs noted. No apparent distress. Does not appear acutely ill or septic. Appears adequately hydrated.  ENT: Oropharynx moist.  PULM: Breathing unlabored.  CV: Heart regular.  DERM: Skin normothermic.  PSYCHIATRIC: Alert and conversant. Grossly oriented. Mood is grossly neutral. Affect appropriate. Judgment and insight not grossly compromised.     Documentation entered by me for this encounter may have been done in part using speech-recognition technology. Although I have made an effort to ensure accuracy, "sound like" errors may exist and should be interpreted in context. -DOMINIK Beach MD   "

## 2019-05-13 NOTE — ASSESSMENT & PLAN NOTE
She describes typical chronic seasonal nasal congestion and rhinorrhea exacerbated by pollen and changes in weather.

## 2019-05-16 ENCOUNTER — OFFICE VISIT (OUTPATIENT)
Dept: ENDOCRINOLOGY | Facility: CLINIC | Age: 49
End: 2019-05-16
Payer: MEDICAID

## 2019-05-16 VITALS
HEART RATE: 75 BPM | WEIGHT: 198 LBS | BODY MASS INDEX: 44.54 KG/M2 | OXYGEN SATURATION: 95 % | HEIGHT: 56 IN | DIASTOLIC BLOOD PRESSURE: 82 MMHG | SYSTOLIC BLOOD PRESSURE: 132 MMHG

## 2019-05-16 DIAGNOSIS — E11.42 DIABETIC POLYNEUROPATHY ASSOCIATED WITH TYPE 2 DIABETES MELLITUS: Chronic | ICD-10-CM

## 2019-05-16 LAB — GLUCOSE SERPL-MCNC: 134 MG/DL (ref 70–110)

## 2019-05-16 PROCEDURE — 99999 PR PBB SHADOW E&M-EST. PATIENT-LVL III: CPT | Mod: PBBFAC,,, | Performed by: INTERNAL MEDICINE

## 2019-05-16 PROCEDURE — 99214 PR OFFICE/OUTPT VISIT, EST, LEVL IV, 30-39 MIN: ICD-10-PCS | Mod: S$PBB,,, | Performed by: INTERNAL MEDICINE

## 2019-05-16 PROCEDURE — 82962 GLUCOSE BLOOD TEST: CPT | Mod: PBBFAC | Performed by: INTERNAL MEDICINE

## 2019-05-16 PROCEDURE — 99214 OFFICE O/P EST MOD 30 MIN: CPT | Mod: S$PBB,,, | Performed by: INTERNAL MEDICINE

## 2019-05-16 PROCEDURE — 99213 OFFICE O/P EST LOW 20 MIN: CPT | Mod: PBBFAC | Performed by: INTERNAL MEDICINE

## 2019-05-16 PROCEDURE — 99999 PR PBB SHADOW E&M-EST. PATIENT-LVL III: ICD-10-PCS | Mod: PBBFAC,,, | Performed by: INTERNAL MEDICINE

## 2019-05-16 RX ORDER — PEN NEEDLE, DIABETIC 30 GX3/16"
NEEDLE, DISPOSABLE MISCELLANEOUS
Qty: 100 EACH | Refills: 3 | Status: SHIPPED | OUTPATIENT
Start: 2019-05-16 | End: 2019-09-04 | Stop reason: SDUPTHER

## 2019-05-16 NOTE — LETTER
May 16, 2019      Ermias Mccormick MD  [address] [facility] [place] [address]           The [facility] Endocrinology  [address] [facility] [address]  Phone: [phone]  Fax: [phone]          Patient: Yolanda Betts   MR Number: [id number]   YOB: 1970   Date of Visit: 5/16/2019       Dear Dr. Ermias Mccormick:    Thank you for referring Yolanda Betts to me for evaluation. Attached you will find relevant portions of my assessment and plan of care.    If you have questions, please do not hesitate to call me. I look forward to following Yolanda Betts along with you.    Sincerely,    Malka Severino MD    Enclosure  CC:  No Recipients    If you would like to receive this communication electronically, please contact [email or web address] or [phone] to request more information on [employer] Link access.    For providers and/or their staff who would like to refer a patient to [facility], please contact us through our one-stop-shop provider referral line, [facility], at [phone].    If you feel you have received this communication in error or would no longer like to receive these types of communications, please e-mail [email or web address]

## 2019-05-16 NOTE — PROGRESS NOTES
Subjective:       Patient ID: Yolanda Betts is a 48 y.o. female.    Patient is new to me    Records were reviewed  Chief Complaint: Establish Care    HPI   Yolanda Betts is here for initial evaluation of type 2 Diabetes Mellitus    Consultation requested by Dr. Ermias Mccormick    PCP: CHARLA Beach MD      Diagnosed:  Around 2000 and has been on insulin several years.  Had better control earlier on around her diagnosis  Saw Millie Pruitt 2/2018 saw endocrinologist once in 2016, Dr. Mejia    Past Medical History:   Diagnosis Date    Anemia     Anxiety     Arthritis     Asthma 10/11/2016    Bipolar 1 disorder     Diabetes mellitus, type 2     Dyslipidemia associated with type 2 diabetes mellitus 5/13/2019    GERD (gastroesophageal reflux disease)     Hypertension     Hypertension complicating diabetes 5/5/2019    Mild persistent asthma without complication 10/11/2016    Morbid obesity with body mass index (BMI) of 45.0 to 49.9 in adult 8/3/2017    Obstructive sleep apnea     Schizoaffective disorder, bipolar type 4/25/2019    Seasonal allergic rhinitis due to pollen 5/13/2019    Type 2 diabetes mellitus with hyperglycemia, without long-term current use of insulin 12/21/2017       Lab Results   Component Value Date    HGBA1C 8.3 (H) 12/22/2017    HGBA1C 8.5 (H) 01/31/2017    HGBA1C 6.6 (H) 03/22/2016       Diabetes medications include: Levemir  70 units at night  Metformin  1000mg bid and Jardiance 10mg(started 4 months)    She has gradually lost weight on the jardiance, but also since 2017 but she has been at a plateau with her weight.  She admits she has not been exercising regularly and her diet could improve    Took victoza and byetta in past early with dx when she was better controlled and these actually caused her sugars to drop     Tried glyburide in the past    She is being followed by Cardiology for history of tachycardia and an echocardiogram and Holter were ordered and also  has hypertension    History of obstructive sleep apnea but not using CPAP    Diabetes Complications:peripheral neuropathy  Toes and fingers have numbness and Cymbalta helps she says  Hypoglycemia: NO      Meal Planning: Lunch today was sandwich and half banana, she admits her diet could improve    Diabetes education: YES:  Many years ago and her rheumatologist has referred her to diabetes education       SMBG: Tests BG 2 times a day    Log or meter available: no-POCT glucose in the office was 133    Home values if available:  FBs to 140s  Pre-lunch:  Pre-dinner:  Bedtime:  Post Breakfast:  Post Lunch  Post Dinner  Ranges: later in day 200s occ, 160 to 180s    Exercise: No     STANDARDS of CARE:        ACE inhibitor or angiotensin II receptor blocker:  Yes        Statin drug:  Yes        Eye exam within last year: Yes        Influenza vaccine up to date: Yes        Pneumonia vaccine:yes         No family history of medullary thyroid cancer and no history of pancreatitis  I have reviewed the past medical, family and social history    Review of Systems   Constitutional: Positive for fatigue. Negative for appetite change, fever and unexpected weight change.        Does complain of low energy   HENT: Negative for sore throat and trouble swallowing.    Eyes: Negative for visual disturbance.   Respiratory: Negative for shortness of breath and wheezing.    Cardiovascular: Negative for chest pain, palpitations and leg swelling.   Gastrointestinal: Negative for diarrhea, nausea and vomiting.   Endocrine: Negative for cold intolerance, heat intolerance, polydipsia, polyphagia and polyuria.   Genitourinary: Negative for difficulty urinating, dysuria and menstrual problem.   Musculoskeletal: Negative for arthralgias and joint swelling.   Skin: Negative for rash.   Neurological: Negative for dizziness, weakness, numbness and headaches.   Psychiatric/Behavioral: Negative for confusion, dysphoric mood and sleep disturbance.        Objective:      Physical Exam   Constitutional: She is oriented to person, place, and time. No distress.   HENT:   Head: Normocephalic and atraumatic.   Right Ear: External ear normal.   Left Ear: External ear normal.   Nose: Nose normal.   Mouth/Throat: Oropharynx is clear and moist. No oropharyngeal exudate.   Eyes: Pupils are equal, round, and reactive to light. Conjunctivae and EOM are normal. No scleral icterus.   Neck: No JVD present. No tracheal deviation present. No thyromegaly present.   Cardiovascular: Normal rate, regular rhythm, normal heart sounds and intact distal pulses. Exam reveals no gallop and no friction rub.   No murmur heard.  Pulmonary/Chest: Effort normal and breath sounds normal. No respiratory distress. She has no wheezes. She has no rales.   Abdominal: Soft. Bowel sounds are normal. She exhibits no distension and no mass. There is no tenderness. There is no rebound and no guarding. No hernia.   Significant abdominal obesity   Musculoskeletal: She exhibits no edema or deformity.   Lymphadenopathy:     She has no cervical adenopathy.   Neurological: She is alert and oriented to person, place, and time. She has normal reflexes. No cranial nerve deficit.   Skin: Skin is warm. No rash noted. She is not diaphoretic. No erythema. No pallor.   She does have hyperpigmentation in posterior and lateral neck areas consistent with acanthosis   Psychiatric: She has a normal mood and affect. Her behavior is normal.   Vitals reviewed.    Protective Sensation (w/ 10 gram monofilament):  Right: Decreased at 5th toe  Left: Decreased at 5th toe    Visual Inspection:  Callus -  Both heels    Pedal Pulses:   Right: Present  Left: Present    Posterior tibialis:   Right:Present  Left: Present       Lab Review:     No components found for: AGBA1C  Lab Results   Component Value Date    CHOL 142 04/25/2019    TRIG 328 (H) 04/25/2019    HDL 36 (L) 04/25/2019    CHOLHDL 25.4 04/25/2019    TOTALCHOLEST 3.9  "04/25/2019    NONHDLCHOL 106 04/25/2019     BMP  Lab Results   Component Value Date     12/22/2017    K 3.9 12/22/2017     12/22/2017    CO2 27 12/22/2017    BUN 8 12/22/2017    CREATININE 0.7 12/22/2017    CALCIUM 9.5 12/22/2017    ANIONGAP 10 12/22/2017    ESTGFRAFRICA >60 12/22/2017    EGFRNONAA >60 12/22/2017     Lab Results   Component Value Date    WBC 7.29 12/22/2017    HGB 11.5 (L) 12/22/2017    HCT 36.5 (L) 12/22/2017    MCV 86 12/22/2017     12/22/2017     Lab Results   Component Value Date    CREATRANDUR 67.0 04/25/2019    MICALBCREAT 10.4 04/25/2019           Assessment:     1. Type 2 diabetes, uncontrolled, with neuropathy  POCT Glucose, Hand-Held Device   2. Diabetic polyneuropathy associated with type 2 diabetes mellitus  insulin detemir U-100 (LEVEMIR FLEXTOUCH) 100 unit/mL (3 mL) SubQ InPn pen        Discussed the pathophysiology, complications, and management of diabetes and the importance of adhering to treatment goals and plans    She would benefit from improving her diet and exercise habits.  Will help arrange the appointment with diabetes education.  Also to improve her diabetes management will add a GLP-1 agonists.  She has taken this in the past.  It seems that Victoza is preferred by her insurance so will start this, she has no contraindications  Plan:   Type 2 diabetes, uncontrolled, with neuropathy  -     POCT Glucose, Hand-Held Device    Diabetic polyneuropathy associated with type 2 diabetes mellitus  -     insulin detemir U-100 (LEVEMIR FLEXTOUCH) 100 unit/mL (3 mL) SubQ InPn pen; Inject 70 Units into the skin every evening.  Dispense: 30 mL; Refill: 3    Other orders  -     liraglutide 0.6 mg/0.1 mL, 18 mg/3 mL, subq PNIJ (VICTOZA 2-MILENA) 0.6 mg/0.1 mL (18 mg/3 mL) PnIj; Use 1.8mg daily  Dispense: 9 mL; Refill: 3  -     pen needle, diabetic (BD ULTRA-FINE MINI PEN NEEDLE) 31 gauge x 3/16" Ndle; Use twice a day  Dispense: 100 each; Refill: 3        1.  Rx changes: She " will start Victoza and gradually increase to 1.8 mg as tolerated  2.  Education: Reviewed ABCs of diabetes management (respective goals in parentheses):  A1C (<7), blood pressure (<130/80), and cholesterol (LDL <100). Discussed blood sugar goals. Discussed weight management.  3.  Compliance at present is estimated to be inadequate. Efforts to improve compliance (if necessary) will be directed at dietary modifications: Reduce carbohydrate portions and eat more non starchy vegetables and increased exercise.  4. Discussed Hypoglycemia management  5.Discussed meal planning and went over plate method and healthy snack guidelines    Follow up in about 3 months (around 8/16/2019).       Thank you to Dr. Ermias Mccormick for this consultation    Disclaimer:  This note may have been partially prepared using voice recognition software and  it may have not been extensively proofed, as such there could be errors within the text such as sound alike errors.

## 2019-05-17 ENCOUNTER — OFFICE VISIT (OUTPATIENT)
Dept: PODIATRY | Facility: CLINIC | Age: 49
End: 2019-05-17
Payer: MEDICAID

## 2019-05-17 VITALS
HEIGHT: 56 IN | BODY MASS INDEX: 44.68 KG/M2 | SYSTOLIC BLOOD PRESSURE: 134 MMHG | WEIGHT: 198.63 LBS | DIASTOLIC BLOOD PRESSURE: 86 MMHG | HEART RATE: 85 BPM

## 2019-05-17 DIAGNOSIS — M20.41 HAMMER TOES OF BOTH FEET: ICD-10-CM

## 2019-05-17 DIAGNOSIS — M72.2 PLANTAR FASCIITIS: ICD-10-CM

## 2019-05-17 DIAGNOSIS — E11.42 DIABETIC POLYNEUROPATHY ASSOCIATED WITH TYPE 2 DIABETES MELLITUS: Primary | Chronic | ICD-10-CM

## 2019-05-17 DIAGNOSIS — M20.42 HAMMER TOES OF BOTH FEET: ICD-10-CM

## 2019-05-17 DIAGNOSIS — M24.573 EQUINUS CONTRACTURE OF ANKLE: ICD-10-CM

## 2019-05-17 DIAGNOSIS — L84 CORN OR CALLUS: ICD-10-CM

## 2019-05-17 PROCEDURE — 99999 PR PBB SHADOW E&M-EST. PATIENT-LVL III: CPT | Mod: PBBFAC,,, | Performed by: PODIATRIST

## 2019-05-17 PROCEDURE — 99204 PR OFFICE/OUTPT VISIT, NEW, LEVL IV, 45-59 MIN: ICD-10-PCS | Mod: S$PBB,,, | Performed by: PODIATRIST

## 2019-05-17 PROCEDURE — 99213 OFFICE O/P EST LOW 20 MIN: CPT | Mod: PBBFAC | Performed by: PODIATRIST

## 2019-05-17 PROCEDURE — 99204 OFFICE O/P NEW MOD 45 MIN: CPT | Mod: S$PBB,,, | Performed by: PODIATRIST

## 2019-05-17 PROCEDURE — 99999 PR PBB SHADOW E&M-EST. PATIENT-LVL III: ICD-10-PCS | Mod: PBBFAC,,, | Performed by: PODIATRIST

## 2019-05-17 NOTE — PATIENT INSTRUCTIONS
[facility] [employer]    [address] [facility]  [place] [address]  Phone: [phone] [address] [facility]  [place] [address]   Phone: [phone] [address] [place] [address]   Phone: [phone] [address] [facility] [place] [address]   Phone: [phone]    [address] [facility]  [place] [address]   Phone: [phone] [address] [facility] [address] [place] [address] [place] [address]   Phone: [phone]        [facility]  [address] [facility]  [place] [address]   Phone: [phone]  Fax: [phone] [address] [address] [phone]

## 2019-05-17 NOTE — PROGRESS NOTES
Subjective:     Patient ID: Yolanda Betts is a 49 y.o. female.    Chief Complaint: Foot Pain (c/o bilateral pain in toes, arch and heels. patient states the pain is a constant pain. rates pain 7/10. wears sandals w/o socks. diabetic Pt. last seen on 5/13/19 with PCP Dr. Beach.)    Yolanda is a 49 y.o. female who presents to the clinic for evaluation and treatment of high risk feet. Yolanda has a past medical history of Anemia, Anxiety, Arthritis, Asthma (10/11/2016), Bipolar 1 disorder, Diabetes mellitus, type 2, Dyslipidemia associated with type 2 diabetes mellitus (5/13/2019), GERD (gastroesophageal reflux disease), Hypertension, Hypertension complicating diabetes (5/5/2019), Mild persistent asthma without complication (10/11/2016), Morbid obesity with body mass index (BMI) of 45.0 to 49.9 in adult (8/3/2017), Obstructive sleep apnea, Schizoaffective disorder, bipolar type (4/25/2019), Seasonal allergic rhinitis due to pollen (5/13/2019), and Type 2 diabetes mellitus with hyperglycemia, without long-term current use of insulin (12/21/2017). The patient's chief complaint is numbness of her toes. Patient also complains of bilateral heel pain that is worse first step out of bed in the morning. Patient states she take Aleve to help when painful.  This patient has documented high risk feet requiring routine maintenance secondary to diabetes mellitis and those secondary complications of diabetes, as mentioned..    PCP: CHARLA Beach MD    Date Last Seen by PCP: 05/13/19    Current shoe gear:  Affected Foot: Flip-flops     Unaffected Foot: Flip-flops    Hemoglobin A1C   Date Value Ref Range Status   12/22/2017 8.3 (H) 4.0 - 5.6 % Final     Comment:     According to ADA guidelines, hemoglobin A1c <7.0% represents  optimal control in non-pregnant diabetic patients. Different  metrics may apply to specific patient populations.   Standards of Medical Care in Diabetes-2016.  For the purpose of screening for the  presence of diabetes:  <5.7%     Consistent with the absence of diabetes  5.7-6.4%  Consistent with increasing risk for diabetes   (prediabetes)  >or=6.5%  Consistent with diabetes  Currently, no consensus exists for use of hemoglobin A1c  for diagnosis of diabetes for children.  This Hemoglobin A1c assay has significant interference with fetal   hemoglobin   (HbF). The results are invalid for patients with abnormal amounts of   HbF,   including those with known Hereditary Persistence   of Fetal Hemoglobin. Heterozygous hemoglobin variants (HbAS, HbAC,   HbAD, HbAE, HbA2) do not significantly interfere with this assay;   however, presence of multiple variants in a sample may impact the %   interference.     01/31/2017 8.5 (H) 4.5 - 6.2 % Final     Comment:     According to ADA guidelines, hemoglobin A1C <7.0% represents  optimal control in non-pregnant diabetic patients.  Different  metrics may apply to specific populations.   Standards of Medical Care in Diabetes - 2016.  For the purpose of screening for the presence of diabetes:  <5.7%     Consistent with the absence of diabetes  5.7-6.4%  Consistent with increasing risk for diabetes   (prediabetes)  >or=6.5%  Consistent with diabetes  Currently no consensus exists for use of hemoglobin A1C  for diagnosis of diabetes for children.     03/22/2016 6.6 (H) 4.5 - 6.2 % Final           Patient Active Problem List   Diagnosis    Essential hypertension    GERD (gastroesophageal reflux disease)    Allergic rhinitis    Menopausal syndrome (hot flashes)    Migraines    Abnormal ECG    Obstructive sleep apnea    Psychophysiological insomnia    Sleep-related bruxism    Nightmares    Sleepwalking    Diabetic polyneuropathy associated with type 2 diabetes mellitus    Mild persistent asthma without complication    Bipolar 1 disorder    Anemia    Diffusion capacity of lung (dl), decreased    Hypertriglyceridemia    Anxiety    Morbid obesity with body mass index  "(BMI) of 45.0 to 49.9 in adult    Type 2 diabetes mellitus with hyperglycemia, without long-term current use of insulin    Iron deficiency anemia    Screen for colon cancer    Schizoaffective disorder, bipolar type    Vitamin D deficiency    Fibromyalgia    Hypertension complicating diabetes    Dyslipidemia associated with type 2 diabetes mellitus    Seasonal allergic rhinitis due to pollen       Medication List with Changes/Refills   Current Medications    ALBUTEROL (PROVENTIL/VENTOLIN HFA) 90 MCG/ACTUATION INHALER    Inhale 2 puffs into the lungs every 4 (four) hours as needed.    ALPRAZOLAM (XANAX) 1 MG TABLET    Take 1 mg by mouth 3 (three) times daily as needed.    ASPIRIN (ECOTRIN) 81 MG EC TABLET    Take 81 mg by mouth once daily.    ATORVASTATIN (LIPITOR) 20 MG TABLET    Take 1 tablet (20 mg total) by mouth every evening.    BD INSULIN SYRINGE ULTRA-FINE 1/2 ML 30 GAUGE X 1/2" SYRG        BLOOD SUGAR DIAGNOSTIC STRP    Check blood glucose 3 times daily as directed and as needed (dispense insurance preferred brand or patient choice)    BLOOD-GLUCOSE METER MISC    use 3 times daily as directed    CHOLECALCIFEROL, VITAMIN D3, (VITAMIN D3) 2,000 UNIT TAB    Take 1 tablet (2,000 Units total) by mouth once daily.    DULOXETINE (CYMBALTA) 60 MG CAPSULE    Take 60 mg by mouth 2 (two) times daily.    EMPAGLIFLOZIN (JARDIANCE) 10 MG TAB    Take 1 tablet (10 mg) by mouth every morning.    ERGOCALCIFEROL (ERGOCALCIFEROL) 50,000 UNIT CAP    Take 1 capsule (50,000 Units total) by mouth every 7 days. for 12 doses    FISH OIL-OMEGA-3 FATTY ACIDS 300-1,000 MG CAPSULE    Take 1 capsule by mouth once daily.    FLUTICASONE PROPIONATE (FLONASE) 50 MCG/ACTUATION NASAL SPRAY    2 sprays (100 mcg total) by Each Nare route once daily.    FLUTICASONE-SALMETEROL DISKUS INHALER 250-50 MCG    INHALE 1 PUFF INTO THE LUNGS 2 (TWO) TIMES DAILY *RINSE MOUTH AFTER EACH USE*    GABAPENTIN (NEURONTIN) 100 MG CAPSULE    Take 1 capsule " "(100 mg total) by mouth 3 (three) times daily.    INSULIN DETEMIR U-100 (LEVEMIR FLEXTOUCH) 100 UNIT/ML (3 ML) SUBQ INPN PEN    Inject 70 Units into the skin every evening.    LANCETS 28 GAUGE MISC    use as directed 3 times daily    LINZESS 145 MCG CAP CAPSULE    Take 145 mcg by mouth as needed.     LIRAGLUTIDE 0.6 MG/0.1 ML, 18 MG/3 ML, SUBQ PNIJ (VICTOZA 2-MILENA) 0.6 MG/0.1 ML (18 MG/3 ML) PNIJ    Use 1.8mg daily    LOSARTAN-HYDROCHLOROTHIAZIDE 100-25 MG (HYZAAR) 100-25 MG PER TABLET    Take 1 tablet by mouth once daily.    LURASIDONE (LATUDA) 60 MG TAB TABLET    Take 60 mg by mouth once daily.    METFORMIN (GLUCOPHAGE) 1000 MG TABLET    TAKE 1 TABLET (1,000 MG TOTAL) BY MOUTH 2 (TWO) TIMES DAILY.    METOPROLOL SUCCINATE (TOPROL-XL) 100 MG 24 HR TABLET    Take 1 tablet (100 mg total) by mouth once daily.    MIRTAZAPINE (REMERON) 45 MG TABLET    Take 45 mg by mouth every evening.    MONTELUKAST (SINGULAIR) 10 MG TABLET    Take 1 tablet (10 mg total) by mouth nightly.    MULTIVIT,CALC,MINS/IRON/FOLIC (DAILY MULTIPLE FOR WOMEN ORAL)    Take 1 tablet by mouth once daily.    NAPROXEN (NAPROSYN) 500 MG TABLET    Take 1 tablet (500 mg total) by mouth 2 (two) times daily as needed (FOR FIBROMYALGIA PAIN).    NITROGLYCERIN (NITROSTAT) 0.4 MG SL TABLET    Dissolve 1 tablet under the tongue every 5 minutes as needed, up to 3 times. If chest pain persists, call 911.    PEN NEEDLE, DIABETIC (BD ULTRA-FINE MINI PEN NEEDLE) 31 GAUGE X 3/16" NDLE    Use twice a day    PROMETHAZINE (PHENERGAN) 25 MG TABLET    Take 1 tablet (25 mg total) by mouth every 4 to 6 hours as needed for Nausea. sedating    VERAPAMIL (CALAN) 120 MG TABLET    Take 1 tablet (120 mg total) by mouth 2 (two) times daily.    ZOLPIDEM (AMBIEN) 10 MG TAB    Take 10 mg by mouth nightly as needed.       Review of patient's allergies indicates:   Allergen Reactions    Lortab [hydrocodone-acetaminophen] Itching    Neuromuscular blockers, steroidal      some    Latex, " natural rubber Rash    Morphine Rash     itching    Seconal [secobarbital sodium] Rash     itching    Tylox [oxycodone-acetaminophen] Rash       Past Surgical History:   Procedure Laterality Date    BELT ABDOMINOPLASTY  1996    BREAST SURGERY Bilateral 1996    Reduction     SECTION      X 2    COLONOSCOPY      COLONOSCOPY N/A 2018    [facility]    colonoscopy for iron deficiency anemia N/A 2018    [facility]    ESOPHAGOGASTRODUODENOSCOPY (EGD) N/A 2018    [facility]    HYSTERECTOMY      MOUTH SURGERY      TUBAL LIGATION         Family History   Problem Relation Age of Onset    Diabetes Mother     Hyperlipidemia Mother     Hypertension Mother     Asthma Mother     COPD Mother     Glaucoma Mother     Thyroid disease Mother     Hypertension Father     Hyperlipidemia Father     Cancer Father         Brain, lung, liver, kidney    Heart disease Maternal Grandmother     Hyperlipidemia Maternal Grandmother     Hypertension Maternal Grandmother     Cataracts Maternal Grandmother     Diabetes Maternal Grandmother     Heart disease Maternal Grandfather     Hyperlipidemia Maternal Grandfather     Hypertension Maternal Grandfather     Glaucoma Maternal Grandfather     Cancer Maternal Grandfather     Cataracts Maternal Grandfather     Macular degeneration Maternal Grandfather     Diabetes Maternal Grandfather     Heart disease Paternal Grandmother     Hyperlipidemia Paternal Grandmother     Hypertension Paternal Grandmother     Cataracts Paternal Grandmother     Heart disease Paternal Grandfather     Hyperlipidemia Paternal Grandfather     Hypertension Paternal Grandfather     Cataracts Paternal Grandfather     Breast cancer Maternal Cousin     Breast cancer Maternal Cousin     Breast cancer Maternal Cousin     Breast cancer Maternal Cousin   "      Social History     Socioeconomic History    Marital status: Single     Spouse name: Not on file    Number of children: Not on file    Years of education: Not on file    Highest education level: Not on file   Occupational History    Not on file   Social Needs    Financial resource strain: Not on file    Food insecurity:     Worry: Not on file     Inability: Not on file    Transportation needs:     Medical: Not on file     Non-medical: Not on file   Tobacco Use    Smoking status: Never Smoker    Smokeless tobacco: Never Used   Substance and Sexual Activity    Alcohol use: Yes     Alcohol/week: 0.0 oz     Comment: socially    Drug use: No    Sexual activity: Not Currently     Partners: Male   Lifestyle    Physical activity:     Days per week: Not on file     Minutes per session: Not on file    Stress: Not on file   Relationships    Social connections:     Talks on phone: Not on file     Gets together: Not on file     Attends [employer] service: Not on file     Active member of club or organization: Not on file     Attends meetings of clubs or organizations: Not on file     Relationship status: Not on file   Other Topics Concern    Not on file   Social History Narrative    Long-term care nurse       Vitals:    05/17/19 0756   BP: 134/86   Pulse: 85   Weight: 90.1 kg (198 lb 10.2 oz)   Height: 4' 8" (1.422 m)   PainSc:   7   PainLoc: Foot       Hemoglobin A1C   Date Value Ref Range Status   12/22/2017 8.3 (H) 4.0 - 5.6 % Final     Comment:     According to ADA guidelines, hemoglobin A1c <7.0% represents  optimal control in non-pregnant diabetic patients. Different  metrics may apply to specific patient populations.   Standards of Medical Care in Diabetes-2016.  For the purpose of screening for the presence of diabetes:  <5.7%     Consistent with the absence of diabetes  5.7-6.4%  Consistent with increasing risk for diabetes   (prediabetes)  >or=6.5%  Consistent with diabetes  Currently, no consensus " exists for use of hemoglobin A1c  for diagnosis of diabetes for children.  This Hemoglobin A1c assay has significant interference with fetal   hemoglobin   (HbF). The results are invalid for patients with abnormal amounts of   HbF,   including those with known Hereditary Persistence   of Fetal Hemoglobin. Heterozygous hemoglobin variants (HbAS, HbAC,   HbAD, HbAE, HbA2) do not significantly interfere with this assay;   however, presence of multiple variants in a sample may impact the %   interference.     01/31/2017 8.5 (H) 4.5 - 6.2 % Final     Comment:     According to ADA guidelines, hemoglobin A1C <7.0% represents  optimal control in non-pregnant diabetic patients.  Different  metrics may apply to specific populations.   Standards of Medical Care in Diabetes - 2016.  For the purpose of screening for the presence of diabetes:  <5.7%     Consistent with the absence of diabetes  5.7-6.4%  Consistent with increasing risk for diabetes   (prediabetes)  >or=6.5%  Consistent with diabetes  Currently no consensus exists for use of hemoglobin A1C  for diagnosis of diabetes for children.     03/22/2016 6.6 (H) 4.5 - 6.2 % Final       Review of Systems   Constitutional: Negative for chills and fever.   Respiratory: Negative for shortness of breath.    Cardiovascular: Negative for chest pain, palpitations, orthopnea, claudication and leg swelling.   Gastrointestinal: Negative for diarrhea, nausea and vomiting.   Musculoskeletal: Negative for joint pain.   Skin: Negative for rash.   Neurological: Positive for tingling. Negative for dizziness, sensory change, focal weakness and weakness.   Psychiatric/Behavioral: Negative.          Objective:   PHYSICAL EXAM: Apperance: Alert and orient in no distress,well developed, and with good attention to grooming and body habits  Patient presents ambulating in flip flops  LOWER EXTREMITY EXAM:  VASCULAR: Dorsalis pedis pulses 2/4 bilateral and Posterior Tibial pulses 2/4 bilateral.  Capillary fill time <4 seconds bilateral. No edema observed bilateral. Varicosities absent bilateral. Skin temperature of the lower extremities is warm to warm, proximal to distal. Hair growth WNL bilateral.  DERMATOLOGICAL: No skin rashes, subcutaneous nodules, lesions, or ulcers observed bilateral. Nails 1,2,3,4,5 bilateral normal length and thickness. Webspaces 1,2,3,4 bilateral clean, dry and without evidence of break in skin integrity.   NEUROLOGICAL: Light touch, sharp-dull, proprioception all present and equal bilaterally.  Vibratory sensation diminished at bilateral hallux. Protective sensation absent at toe sites as tested with a [place] 5.07 monofilament.   MUSCULOSKELETAL: Muscle strength is 5/5 for foot inverters, everters, plantarflexors, and dorsiflexors. Muscle tone is normal. Ankle joints bilateral shows decreased ROM. bilateral ankle ROM shows greater decrease in dorsiflexion with knee extended. Ankle joint ROM is pain free and without crepitus bilateral. Pain to palpation bilateral plantar medial tubercle. Plantar medial aspect of bilateral heels shows tenderness to palpation. No pain on medial-lateral compression of the calcaneus. Flexible hammertoes noted bilateral.        Assessment:   Diabetic polyneuropathy associated with type 2 diabetes mellitus  -     DIABETIC SHOES FOR HOME USE    Hammer toes of both feet  -     DIABETIC SHOES FOR HOME USE    Corn or callus  -     DIABETIC SHOES FOR HOME USE    Equinus contracture of ankle    Plantar fasciitis          Plan:   Diabetic polyneuropathy associated with type 2 diabetes mellitus  -     DIABETIC SHOES FOR HOME USE    Hammer toes of both feet  -     DIABETIC SHOES FOR HOME USE    Corn or callus  -     DIABETIC SHOES FOR HOME USE    Equinus contracture of ankle    Plantar fasciitis      I counseled the patient on her conditions, regarding findings of my examination, my impressions, and usual treatment plan.   Greater than 50% of this  visit spent on counseling and coordination of care.  Greater than 15 minutes of a 20 minute appointment spent on education about the diabetic foot, neuropathy, and prevention of limb loss.  Shoe inspection. Diabetic Foot Education. Patient reminded of the importance of good nutrition and blood sugar control to help prevent podiatric complications of diabetes. Patient instructed on proper foot hygeine. We discussed wearing proper shoe gear, daily foot inspections, never walking without protective shoe gear, never putting sharp instruments to feet.    I explained to the patient that etiology and treatment options for heel pain including rest,  ice messages, stretching exercises, strappings/tappings, NSAID's, injections, new shoegear with orthotic inserts, and/or surgical treatment.   Patient agreed to diabetic shoes and stretching exercises.   I gave written and verbal instructions on heel cord stretching and this was demonstrated for the patient. Patient expressed understanding.  Patient instructed on adequate icing techniques. Patient should ice the affected area at least once per day x 10 minutes for 10 days . I advised the  patient that extra icing would also be beneficial to ensure adequate anti inflammatory effect.   The patient and I reviewed the types of shoes she should be wearing, my recommendation includes generally the best time of the day for a shoe fitting is the afternoon, shoes with a wide toe box, very good cushion, and tennis shoes with removable inner soles. The patient and I reviewed my recommendations for over-the-counter orthotic inserts.   Prescription written for Diabetic shoes and inserts.   Patient  will continue to monitor the areas daily, inspect feet, wear protective shoe gear when ambulatory, moisturizer to maintain skin integrity. Patient reminded of the importance of good nutrition and blood sugar control to help prevent podiatric complications of diabetes.  Patient to return 12 months  or sooner if needed.                 Alfred Sweeney DPM  [facility]

## 2019-05-17 NOTE — LETTER
May 20, 2019      DOMINIK Beach MD  [address]  [place] [address]           [facility]  [address] [place] [address]  Phone: [phone]  Fax: [phone]          Patient: Yolanda Betts   MR Number: [id number]   YOB: 1970   Date of Visit: 5/17/2019       Dear Dr. DOMINIK Beach:    Thank you for referring Yolanda Betts to me for evaluation. Attached you will find relevant portions of my assessment and plan of care.    If you have questions, please do not hesitate to call me. I look forward to following Yolanda Betts along with you.    Sincerely,    Abad lAba  CC:  No Recipients    If you would like to receive this communication electronically, please contact [email or web address] or [phone] to request more information on [employer] Link access.    For providers and/or their staff who would like to refer a patient to [facility], please contact us through our one-stop-shop provider referral line, [facility] , at [phone].    If you feel you have received this communication in error or would no longer like to receive these types of communications, please e-mail [email or web address]

## 2019-05-20 ENCOUNTER — PATIENT OUTREACH (OUTPATIENT)
Dept: OTHER | Facility: OTHER | Age: 49
End: 2019-05-20

## 2019-05-20 NOTE — PROGRESS NOTES
Last 5 Patient Entered Readings                                      Current 30 Day Average: 148/95     Recent Readings 5/20/2019 5/19/2019 5/19/2019 5/19/2019 5/18/2019    SBP (mmHg) 146 155 171 158 159    DBP (mmHg) 97 96 109 95 99    Pulse 91 82 86 86 84        Digital Medicine: Health  Follow Up    Lifestyle Modifications:    Dietary Modifications (Sodium intake <2,000mg/day, food labels, dining out):   Patient is transitioning into a new job and is still working out her schedule.   I asked about how her diet was going and she said fine but was not very forthcoming with specific info.    Physical Activity:   Patient still plans to start walking a few times per week but has not done so yet.     Medication Therapy:   Patient has been compliant with the medication regimen.    Patient has the following medication side effects/concerns: None  (Frequency/Alleviating factors/Precipitating factors, etc.)     Follow up with Ms. Yolanda Betts completed. No further questions or concerns. Will continue to follow up to achieve health goals.    Notes:  When asked about high readings from over the weekend (5/19) patient had not done anything drastically different with diet, physical activity, or experienced anything stress inducing that she could recall.   Patient spoke with cardiologist who suggests that sleep apnea could be one of the causes of patient's high BP.   Patient is working on completing the order for a sleep apnea study.  Will f/u with patient diet, sleep apnea order, and starting physical activity at next outreach.

## 2019-05-21 ENCOUNTER — HOSPITAL ENCOUNTER (OUTPATIENT)
Dept: SLEEP MEDICINE | Facility: HOSPITAL | Age: 49
Discharge: HOME | End: 2019-05-21
Attending: INTERNAL MEDICINE
Payer: MEDICAID

## 2019-05-21 DIAGNOSIS — G47.36 NOCTURNAL HYPOXEMIA DUE TO OBESITY: ICD-10-CM

## 2019-05-21 DIAGNOSIS — E66.9 NOCTURNAL HYPOXEMIA DUE TO OBESITY: ICD-10-CM

## 2019-05-21 DIAGNOSIS — G47.33 OBSTRUCTIVE SLEEP APNEA: Chronic | ICD-10-CM

## 2019-05-21 PROCEDURE — 95810 PR POLYSOMNOGRAPHY, 4 OR MORE: ICD-10-PCS | Mod: 26,,, | Performed by: INTERNAL MEDICINE

## 2019-05-21 PROCEDURE — 95810 POLYSOM 6/> YRS 4/> PARAM: CPT

## 2019-05-21 PROCEDURE — 95810 POLYSOM 6/> YRS 4/> PARAM: CPT | Mod: 26,,, | Performed by: INTERNAL MEDICINE

## 2019-05-21 NOTE — Clinical Note
Please see and order oxygenAlexandmodesto Rainey MDNo Significant Obstructive Sleep apnea(ALEN). AHI was 2.8/hrEKG showed no cardiac abnormalities.Moderate Oxygen DesaturationThe patient snored with moderate snoring volume.No significant periodic leg movements(PLMs) during sleep. However, no significant associated arousals.Reduced testing in Supine position may underestimate ALEN severity: 37.9% sleep was supine.Delayed sleep onset latency.Nocturnal Hypoxemia (G47.36)Insomnia related to Sleep initiation/sleep maintaince/terminalBalwinder Betts - 1970Page 2 of 3Electronically Authenticated By Sylvain Rainey MD on 05/23/2019 04:17 PM, from [phone] Rainey MDNPI: [id number] weight loss interventions ( [employer] Gonzalo or Weight Watchers), bariatric assessment for medicaland or surgical options for weight lossOxygen indicated with sleepRepeat testing with increased supine postion testing may be considered.Avoid alcohol, desmond

## 2019-05-21 NOTE — Clinical Note
Appt with LIZNo Significant Obstructive Sleep apnea(ALEN). AHI was 2.8/hrEKG showed no cardiac abnormalities.Moderate Oxygen DesaturationThe patient snored with moderate snoring volume.No significant periodic leg movements(PLMs) during sleep. However, no significant associated arousals.Reduced testing in Supine position may underestimate ALEN severity: 37.9% sleep was supine.Delayed sleep onset latency.Nocturnal Hypoxemia (G47.36)Insomnia related to Sleep initiation/sleep maintaince/terminalBalwinder Betts - 1970Page 2 of 3Electronically Authenticated By Sylvain Rainey MD on 05/23/2019 04:17 PM, from [phone] Rainey MDNPI: [id number] weight loss interventions ( [employer] Gonzalo or Weight Watchers), bariatric assessment for medicaland or surgical options for weight lossOxygen indicated with sleepRepeat testing with increased supine postion testing may be considered.Avoid alcohol, sedatives and other CNS depressants that may

## 2019-05-22 ENCOUNTER — OFFICE VISIT (OUTPATIENT)
Dept: OPHTHALMOLOGY | Facility: CLINIC | Age: 49
End: 2019-05-22
Payer: MEDICAID

## 2019-05-22 DIAGNOSIS — H52.13 MYOPIA WITH PRESBYOPIA, BILATERAL: ICD-10-CM

## 2019-05-22 DIAGNOSIS — H52.4 MYOPIA WITH PRESBYOPIA, BILATERAL: ICD-10-CM

## 2019-05-22 DIAGNOSIS — E11.9 TYPE 2 DIABETES MELLITUS WITHOUT RETINOPATHY: Primary | ICD-10-CM

## 2019-05-22 DIAGNOSIS — H04.129 DRY EYE: ICD-10-CM

## 2019-05-22 PROCEDURE — 99212 OFFICE O/P EST SF 10 MIN: CPT | Mod: PBBFAC | Performed by: OPTOMETRIST

## 2019-05-22 PROCEDURE — 92015 PR REFRACTION: ICD-10-PCS | Mod: ,,, | Performed by: OPTOMETRIST

## 2019-05-22 PROCEDURE — 99999 PR PBB SHADOW E&M-EST. PATIENT-LVL II: CPT | Mod: PBBFAC,,, | Performed by: OPTOMETRIST

## 2019-05-22 PROCEDURE — 92014 COMPRE OPH EXAM EST PT 1/>: CPT | Mod: S$PBB,,, | Performed by: OPTOMETRIST

## 2019-05-22 PROCEDURE — 92015 DETERMINE REFRACTIVE STATE: CPT | Mod: ,,, | Performed by: OPTOMETRIST

## 2019-05-22 PROCEDURE — 92014 PR EYE EXAM, EST PATIENT,COMPREHESV: ICD-10-PCS | Mod: S$PBB,,, | Performed by: OPTOMETRIST

## 2019-05-22 PROCEDURE — 99999 PR PBB SHADOW E&M-EST. PATIENT-LVL II: ICD-10-PCS | Mod: PBBFAC,,, | Performed by: OPTOMETRIST

## 2019-05-22 RX ORDER — CYCLOSPORINE 0.5 MG/ML
1 EMULSION OPHTHALMIC 2 TIMES DAILY
Qty: 60 EACH | Refills: 11 | Status: SHIPPED | OUTPATIENT
Start: 2019-05-22 | End: 2020-02-28

## 2019-05-22 NOTE — PROGRESS NOTES
[facility]  Physical Therapy Initial Evaluation    Name: Yolanda Betts  Clinic Number: [id number]    Therapy Diagnosis:   Encounter Diagnoses   Name Primary?    Chronic neck pain Yes    Chronic bilateral low back pain without sciatica      Physician: Abilio Gibbs MD    Physician Orders: PT Eval and Treat   Medical Diagnosis from Referral: OA, unspecified type and site  Evaluation Date: 5/23/2019  Authorization Period Expiration: 6/30/19  Plan of Care Expiration: 6/23/19  Visit # / Visits authorized: 1/ 1    Time In: 2:30  Time Out: 3:25  Total Billable Time: 50 minutes    Precautions: Diabetes    Subjective   Date of onset: 15 years ago, 3 months ago recent exacerbation.  History of current condition - Yolanda reports: that about 15 years ago her shoulders and back started hurting. Reports shoulders hurt all the time even at rest. 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 Hurts on both shoulders. Reports tingling across her shoulders. No radiating symptoms. Work requires a lot of lifting, cleaning, going to the grocery. Usually does not have to do any lifting on people, occasionally a child. When she wakes up in the morning feels its best and then slowly increases it. Gradually increases at the end of the day. No injury with shoulders. Patient reports sharp pain in the back and across the midline. Back pain already high in the morning. Nothing seems to make the back pain worse or better. Reports that she has B calf pain and numbness in both feet. Sometimes her back pain is at a 4/10, but this is very rare. Reports numbness down her spine. Having trouble grasping as well. On no exercise program or walking. Medication does not make anything feel better.     Pain: shoulder  Current 8/10, worst 10/10, best 3/10   Location: bilateral shoulder   Description: Tingling and Sharp  Aggravating Factors: Sitting, Standing, Laying, Extension,  Flexing and Lifting  Easing Factors: nothing     Pain: back  Current 8/10, worst 10/10, best 4/10   Location: bilateral back   Description: Tingling and Sharp  Aggravating Factors: Sitting, Standing, Bending, Walking and Morning  Easing Factors: nothing    Prior Therapy: back about 3 years ago  Occupation: [employer]  Prior Level of Function: IND, but painful  Current Level of Function: IND, but more painful and takes more time performing tasks    Imaging: none    Medical History: anemia, anxiety, arthritis, asthma, bipolar 1 disorder, DM, GERD, HTN, morbid obesity, ALEN    Surgical History:   Yolanda Betts  has a past surgical history that includes  section; Mouth surgery (); Hysterectomy; Breast surgery (Bilateral, ); Tubal ligation; Colonoscopy; Belt abdominoplasty (); Colonoscopy (N/A, 2018); and Colonoscopy (N/A, 2018).    Medications:   Yolanda has a current medication list which includes the following prescription(s): albuterol, alprazolam, aspirin, atorvastatin, bd insulin syringe ultra-fine, blood sugar diagnostic, blood-glucose meter, cholecalciferol (vitamin d3), cyclosporine, duloxetine, empagliflozin, ergocalciferol, fish oil-omega-3 fatty acids, fluticasone propionate, fluticasone-salmeterol 250-50 mcg/dose, gabapentin, insulin detemir u-100, lancets, linzess, liraglutide 0.6 mg/0.1 ml (18 mg/3 ml) subq pnij, losartan-hydrochlorothiazide 100-25 mg, lurasidone, metformin, metoprolol succinate, mirtazapine, montelukast, multivit,calc,mins/iron/folic, naproxen, nitroglycerin, pen needle, diabetic, promethazine, verapamil, and zolpidem.    Allergies:   Review of patient's allergies indicates:   Allergen Reactions    Lortab [hydrocodone-acetaminophen] Itching    Neuromuscular blockers, steroidal      some    Latex, natural rubber Rash    Morphine Rash     itching    Seconal [secobarbital sodium] Rash     itching    Tylox [oxycodone-acetaminophen] Rash        Pts goals:  decrease in pain, improve mobility    Objective     Gait: decreased jam, decreased lateral weight shift, decreased step length    Squat: Double leg: not able to go very low at all, rounding of back   Single leg: not able to perform    Balance: impairment noted with narrow SARWAT    Reflex/Sensation: intact    L/sp AROM:   % Pain Present (Y/N)   FB 60% Y   RSB 50% Y   LSB 50% Y   BB 40% Y   RRot 75% Y   LRot 75% Y     Strength:  Hip flexors  3+/5 3+/5  Quadriceps  4/5 4/5      Hamstrings  4/5 4/5     External rotators 3+/5 3+/5     Gluteus Medius 3/5 3/5     Gluteus Joby 3/5 3/5     Plank time  Not able to perform    Special Test: Slump Test:  neg  Distraction:  neg    SLR Test:  neg  Quadrant:  neg    Instability test: pos        PIVM Lumbar: decreased lumbar mobility    Thoracic Mobility: decreased mobility and decreased motion into extension      Palpation: extremely tender to B upper traps, around B shoulders with very light touch; tender to lumbar paraspinals as well    Neural Tension Test: negative    Posture: Pt noted to present with forward head/rounded shoulder posture.    Scapular AROM standing:     Shoulder ROM:   Active/Passive Joint Range Right Left   Flexion 65%/75% 50%/65%   ABDuction 50%/65% 50%/65%   External Rotation 40%/60% 40%/50%   Internal Rotation 50%/65% 50%/65%   Extension 75% 75%     Cervical Spine AROM:   % Pain   % Y   BB 75% Y   RSB 65% Y   LSB 65% Y   RR 80% N   LR 80% N     Strength:  Muscle (Myotome) Right Left   Shoulder Flex 4/5 4/5   Shoulder Abduction 3+/5 3+/5   Elbow Flexors (C5) 4/5 4/5   Mid Trap 3/5    Lower trap 3/5      Sensation: Intact  Reflexes: Intact    Special Test:  Moseley pos  Neers  pos     Empty Can neg  Drop Arm neg    Joint Mobility: hypomobile, but seems to be hesitant due to increase in pain    Upper Limb Tension Test: negative    Function: Patient reports 48% disability based on score of the Quick Shoulder Dash on initial evaluation.    Tenderness  to palpation:  Patient tender to palpate along B UT/along shoulders/shoulder blades with very light touch.    TREATMENT   Treatment Time In: 3:10  Treatment Time Out: 3:25  Total Treatment time separate from Evaluation: 15 minutes    Yolanda received therapeutic exercises to develop strength, flexibility and posture for 15 minutes including:  LTR  PPT  Wall slide  Shrugs 5 second hold    Home Exercises and Patient Education Provided    Education provided:   -Education on condition, HEP, and chronic pain/neuroscience of pain    Written Home Exercises Provided: yes.  Exercises were reviewed and Yolanda was able to demonstrate them prior to the end of the session.  Yolanda demonstrated good  understanding of the education provided.     See EMR under Patient Instructions for exercises provided 5/23/2019.    Assessment   Yolanda is a 49 y.o. female referred to outpatient Physical Therapy with a medical diagnosis of Osteoarthritis. Pt presents with decreased shoulder mobility/ROM, increased pain/adverse symptoms, decreased core/hip strength, decreased shoulder/postural strength, decreased lumbar and thoracic mobility, impaired motor control, and decreased tolerance to activity. Patient's symptoms seem to be most consistent with RC irritation to B shoulders and decreased core strength/stability noted for lumbar spine. However due to the duration of her pain as well as so tender with light touch and with very small movements, patient's symptoms seem to more consistent with central pain sensitization causing her widespread pain as well as decreased strength to these areas.    Pt prognosis is Good.   Pt will benefit from skilled outpatient Physical Therapy to address the deficits stated above and in the chart below, provide pt/family education, and to maximize pt's level of independence.     Plan of care discussed with patient: Yes  Pt's spiritual, cultural and educational needs considered and patient is agreeable to the plan of care and  goals as stated below:     Anticipated Barriers for therapy: mental health, chronicity of pain    Medical Necessity is demonstrated by the following  History  Co-morbidities and personal factors that may impact the plan of care Co-morbidities:   anxiety, depression and high BMI    Personal Factors:   attitudes     moderate   Examination  Body Structures and Functions, activity limitations and participation restrictions that may impact the plan of care Body Regions:   neck  back  lower extremities    Body Systems:    ROM  strength  balance  motor learning    Participation Restrictions:   Work, wanted activities, ADLs    Activity limitations:   Learning and applying knowledge  no deficits    General Tasks and Commands  no deficits    Communication  no deficits    Mobility  lifting and carrying objects  walking    Self care  no deficits    Domestic Life  doing house work (cleaning house, washing dishes, laundry)    Interactions/Relationships  no deficits    Life Areas  no deficits    Community and Social Life  community life  recreation and leisure         moderate   Clinical Presentation stable and uncomplicated low   Decision Making/ Complexity Score: low     Goals:  Short Term Goals: In 4 weeks   1.I with HEP  2.Patient to increase GH ROM by 25%  3.Patient to increase MMT strength by 1/2 grade  4.Patient to have pain less than moderate at all times.  5.Patient to score less than 25% impaired on the quick shoulder dash    Long Term Goals: In 8 weeks  1. Patient to score less than 15% impaired on the quick shoulder dash  2. Patient to demo increase in UE strength to 4+/5  3. Patient to have decreased pain to minimal at all times.  4. Patient to demo increase GH ROM to 90%  5. Patient to perform daily activities including work without limitation.      Plan   Plan of care Certification: 5/23/2019 to 6/23/19.    Outpatient Physical Therapy 2 times weekly for 8 weeks to include the following interventions: Electrical  Stimulation TENS, Manual Therapy, Moist Heat/ Ice, Neuromuscular Re-ed, Patient Education, Therapeutic Activites and Therapeutic Exercise.     Mikie Castano, PT    Thank you for this referral.    These services are reasonable and necessary for the conditions set forth above while under my care.

## 2019-05-22 NOTE — LETTER
May 22, 2019      DOMINIK Beach MD  [address] [place] [address]           [facility]  [address] [place] [address]  Phone: [phone]  Fax: [phone]          Patient: Yolanda Betts   MR Number: [id number]   YOB: 1970   Date of Visit: 5/22/2019       Dear Dr. DOMINIK Beach:    Thank you for referring Yolanda Betts to me for evaluation. Attached you will find relevant portions of my assessment and plan of care.    If you have questions, please do not hesitate to call me. I look forward to following Yolanda Betts along with you.    Sincerely,    Toni Holland, OD    Enclosure  CC:  No Recipients    If you would like to receive this communication electronically, please contact [email or web address] or [phone] to request more information on [facility] access.    For providers and/or their staff who would like to refer a patient to [facility], please contact us through our one-stop-shop provider referral line, [facility] , at [phone].    If you feel you have received this communication in error or would no longer like to receive these types of communications, please e-mail [email or web address]

## 2019-05-23 ENCOUNTER — PATIENT MESSAGE (OUTPATIENT)
Dept: SLEEP MEDICINE | Facility: HOSPITAL | Age: 49
End: 2019-05-23

## 2019-05-23 ENCOUNTER — CLINICAL SUPPORT (OUTPATIENT)
Dept: REHABILITATION | Facility: HOSPITAL | Age: 49
End: 2019-05-23
Attending: INTERNAL MEDICINE
Payer: MEDICAID

## 2019-05-23 DIAGNOSIS — G89.29 CHRONIC NECK PAIN: Primary | ICD-10-CM

## 2019-05-23 DIAGNOSIS — M54.2 CHRONIC NECK PAIN: Primary | ICD-10-CM

## 2019-05-23 DIAGNOSIS — G89.29 CHRONIC BILATERAL LOW BACK PAIN WITHOUT SCIATICA: ICD-10-CM

## 2019-05-23 DIAGNOSIS — M54.50 CHRONIC BILATERAL LOW BACK PAIN WITHOUT SCIATICA: ICD-10-CM

## 2019-05-23 PROCEDURE — 97161 PT EVAL LOW COMPLEX 20 MIN: CPT

## 2019-05-23 NOTE — PROCEDURES
"No Significant Obstructive Sleep apnea(ALEN). AHI was 2.8/hr  EKG showed no cardiac abnormalities.  Moderate Oxygen Desaturation  The patient snored with moderate snoring volume.  No significant periodic leg movements(PLMs) during sleep. However, no significant associated arousals.  Reduced testing in Supine position may underestimate ALEN severity: 37.9% sleep was supine.  Delayed sleep onset latency.  Nocturnal Hypoxemia (G47.36)  Insomnia related to Sleep initiation/sleep maintaince/terminal  Yolanda Betts - 1970  Page 2 of 3  Electronically Authenticated By Sylvain Rainey MD on 05/23/2019 04:17 PM, from [phone]  Sylvain Rainey MD  NPI: [id number]  MISCELLANEOUS  Behavioural weight loss interventions ( Qi Gonzalo or Weight Watchers), bariatric assessment for medical  and or surgical options for weight loss  Oxygen indicated with sleep  Repeat testing with increased supine postion testing may be considered.  Avoid alcohol, sedatives and other CNS depressants that may worsen sleep apnea and disrupt normal sleep  architecture.  Sleep hygiene should be reviewed to assess factors that may improve sleep quality.  Weight management and regular exercise should be initiated or continued undersupervison of clinician.  Interventions for SNORING: Mandibular device, ENT surgeries where appropriate and NASAL THEREVENT    See imported Sleep Study result in "Chart Review" under the   "Media tab".      (This Sleep Study was interpreted by a Board Certified Sleep   Specialist who conducted an epoch-by-epoch review of the entire   raw data recording.)     (The indication for this sleep study was reviewed and deemed   appropriate by AASM Practice Parameters or other reasons by a   Board Certified Sleep Specialist.)    Sylvain Rainey MD      "

## 2019-05-28 ENCOUNTER — TELEPHONE (OUTPATIENT)
Dept: PULMONOLOGY | Facility: CLINIC | Age: 49
End: 2019-05-28

## 2019-05-28 ENCOUNTER — PATIENT MESSAGE (OUTPATIENT)
Dept: ADMINISTRATIVE | Facility: OTHER | Age: 49
End: 2019-05-28

## 2019-05-28 NOTE — TELEPHONE ENCOUNTER
Follow up appt scheduled per provider request. Call to patient, informed of appt made for Marie 3, 2019@10:20. Patient voiced understanding and confirmed appointment time and location.

## 2019-05-28 NOTE — TELEPHONE ENCOUNTER
----- Message from Diana York MA sent at 5/28/2019  2:09 PM CDT -----      ----- Message -----  From: Elizabeth Lejeune, NP  Sent: 5/28/2019   1:40 PM  To: Lejeune Elizabeth Staff    She needs to be seen in clinic and order in within 30 days of test.   Please put on my schedule before 6/21.  ----- Message -----  From: Sylvain Rainey MD  Sent: 5/23/2019   5:17 PM  To: Elizabeth Lejeune, NP    Please see and order oxygen    Sylvain Rainey MD    No Significant Obstructive Sleep apnea(ALEN). AHI was 2.8/hr  EKG showed no cardiac abnormalities.  Moderate Oxygen Desaturation  The patient snored with moderate snoring volume.  No significant periodic leg movements(PLMs) during sleep. However, no significant associated arousals.  Reduced testing in Supine position may underestimate ALEN severity: 37.9% sleep was supine.  Delayed sleep onset latency.  Nocturnal Hypoxemia (G47.36)  Insomnia related to Sleep initiation/sleep maintaince/terminal  Yolanda Betts - 1970  Page 2 of 3  Electronically Authenticated By Sylvain Rainey MD on 05/23/2019 04:17 PM, from [phone]  Sylvain Rainey MD  NPI: [id number]  MISCELLANEOUS  Behavioural weight loss interventions ( Qi Gonzalo or Weight Watchers), bariatric assessment for medical  and or surgical options for weight loss  Oxygen indicated with sleep  Repeat testing with increased supine postion testing may be considered.  Avoid alcohol, desmond

## 2019-05-30 DIAGNOSIS — I10 ESSENTIAL HYPERTENSION: Chronic | ICD-10-CM

## 2019-05-30 DIAGNOSIS — E55.9 VITAMIN D INSUFFICIENCY: ICD-10-CM

## 2019-05-30 DIAGNOSIS — I15.2 HYPERTENSION COMPLICATING DIABETES: ICD-10-CM

## 2019-05-30 DIAGNOSIS — E11.59 HYPERTENSION COMPLICATING DIABETES: ICD-10-CM

## 2019-05-31 ENCOUNTER — CLINICAL SUPPORT (OUTPATIENT)
Dept: REHABILITATION | Facility: HOSPITAL | Age: 49
End: 2019-05-31
Attending: INTERNAL MEDICINE
Payer: MEDICAID

## 2019-05-31 DIAGNOSIS — M54.2 CHRONIC NECK PAIN: Primary | ICD-10-CM

## 2019-05-31 DIAGNOSIS — G89.29 CHRONIC NECK PAIN: Primary | ICD-10-CM

## 2019-05-31 DIAGNOSIS — G89.29 CHRONIC BILATERAL LOW BACK PAIN WITHOUT SCIATICA: ICD-10-CM

## 2019-05-31 DIAGNOSIS — M54.50 CHRONIC BILATERAL LOW BACK PAIN WITHOUT SCIATICA: ICD-10-CM

## 2019-05-31 PROCEDURE — 97110 THERAPEUTIC EXERCISES: CPT

## 2019-05-31 NOTE — PROGRESS NOTES
Physical Therapy Daily Treatment Note     Name: Yolanda [facility]  Clinic Number: [id number]    Therapy Diagnosis:   Encounter Diagnoses   Name Primary?    Chronic neck pain Yes    Chronic bilateral low back pain without sciatica      Physician: Abilio Gibbs MD    Visit Date: 5/31/2019    Physician Orders: PT Eval and Treat  Medical Diagnosis: OA, unspecified type and site  Evaluation Date: 5/23/19  Authorization Period Expiration: 6/30/19  Plan of Care Certification Period: 6/23/19  Visit #/Visits authorized: 2/ 16     Time In: 10:00  Time Out: 11:00  Total Billable Time: 45 minutes    Precautions: Diabetes    Subjective     Pt reports: that her exercises have been going well and that her shoulder/neck pain has improved at rest..  She was compliant with home exercise program.  Response to previous treatment: good  Functional change: improvement in resting pain    Pain: 5/10  Location: bilateral back      Objective     Yolanda received therapeutic exercises to develop strength, endurance and flexibility for 45 minutes including:  Treadmill x 5 min  Nustep x 5 min  UE bike x 5 min  DKTC with ball  LTR with ball  PPT with march  Incline weight shift  Isometric shoulder AB/FL/EX  Table slides into FL/AB    Yolanda received the following manual therapy techniques: Joint mobilizations were applied to the: shoulder for 0 minutes, including:  Not performed today    Yolanda participated in neuromuscular re-education activities to improve: Posture for 0 minutes. The following activities were included:  Not performed today    Yolanda received the following supervised modalities after being cleared for contradictions: TENS:  Yolanda received TENS electrical stimulation for pain to the lumbar spine. Pt received continuous mode for 0 minutes. Yolanda tolerated treatment well without any adverse effects.     Yolanda received hot pack for 15 minutes to lumbar spine.    Home Exercises Provided and Patient Education Provided     Education  provided:   - continuing with cardio    Written Home Exercises Provided: Patient instructed to cont prior HEP.  Exercises were reviewed and Yolanda was able to demonstrate them prior to the end of the session.  Yolanda demonstrated good  understanding of the education provided.     See EMR under Patient Instructions for exercises provided prior visit.    Assessment     Patient demonstrated good tolerance to her strengthening/mobility work this session with minimal increase in adverse symptoms. Patient had improvement in symptoms by the end of her session and educated to continue with her HEP and to progress them as she is able to.  Yolanda is progressing well towards her goals.   Pt prognosis is Good.     Pt will continue to benefit from skilled outpatient physical therapy to address the deficits listed in the problem list box on initial evaluation, provide pt/family education and to maximize pt's level of independence in the home and community environment.     Pt's spiritual, cultural and educational needs considered and pt agreeable to plan of care and goals.     Anticipated barriers to physical therapy: mental health, chronicity of pain    Goals: improve tolerance to activity, improve mobility    Plan     Continue with strengthening, mobility, and cardio for 2 x a week for 8 weeks    Mikie Castano, PT

## 2019-06-03 ENCOUNTER — OFFICE VISIT (OUTPATIENT)
Dept: PULMONOLOGY | Facility: CLINIC | Age: 49
End: 2019-06-03
Payer: MEDICAID

## 2019-06-03 VITALS
BODY MASS INDEX: 44.33 KG/M2 | SYSTOLIC BLOOD PRESSURE: 112 MMHG | WEIGHT: 197.06 LBS | HEIGHT: 56 IN | DIASTOLIC BLOOD PRESSURE: 76 MMHG | RESPIRATION RATE: 18 BRPM | OXYGEN SATURATION: 94 % | HEART RATE: 87 BPM

## 2019-06-03 DIAGNOSIS — G47.36 NOCTURNAL HYPOXEMIA DUE TO OBESITY: ICD-10-CM

## 2019-06-03 DIAGNOSIS — E66.2 OBESITY HYPOVENTILATION SYNDROME: ICD-10-CM

## 2019-06-03 DIAGNOSIS — E66.9 NOCTURNAL HYPOXEMIA DUE TO OBESITY: ICD-10-CM

## 2019-06-03 DIAGNOSIS — J45.40 MODERATE PERSISTENT ASTHMA WITHOUT COMPLICATION: Primary | ICD-10-CM

## 2019-06-03 PROCEDURE — 99999 PR PBB SHADOW E&M-EST. PATIENT-LVL V: CPT | Mod: PBBFAC,,, | Performed by: NURSE PRACTITIONER

## 2019-06-03 PROCEDURE — 99214 OFFICE O/P EST MOD 30 MIN: CPT | Mod: S$PBB,,, | Performed by: NURSE PRACTITIONER

## 2019-06-03 PROCEDURE — 99214 PR OFFICE/OUTPT VISIT, EST, LEVL IV, 30-39 MIN: ICD-10-PCS | Mod: S$PBB,,, | Performed by: NURSE PRACTITIONER

## 2019-06-03 PROCEDURE — 99215 OFFICE O/P EST HI 40 MIN: CPT | Mod: PBBFAC | Performed by: NURSE PRACTITIONER

## 2019-06-03 PROCEDURE — 99999 PR PBB SHADOW E&M-EST. PATIENT-LVL V: ICD-10-PCS | Mod: PBBFAC,,, | Performed by: NURSE PRACTITIONER

## 2019-06-03 NOTE — PROGRESS NOTES
"Subjective:      Patient ID: Yolanda Betts is a 49 y.o. female.    Chief Complaint: Sleep Apnea and Asthma    HPI  Patient with asthma presents to the office today for sleep. She lost CPAP in a move. She had a repeat sleep study. She has restless sleep, issues with HTN and waking up with headaches.   Pulmonary inhalers include Advair. She states her asthma is controlled.     Patient Active Problem List   Diagnosis    Essential hypertension    GERD (gastroesophageal reflux disease)    Allergic rhinitis    Menopausal syndrome (hot flashes)    Migraines    Abnormal ECG    Obstructive sleep apnea    Psychophysiological insomnia    Sleep-related bruxism    Nightmares    Sleepwalking    Diabetic polyneuropathy associated with type 2 diabetes mellitus    Mild persistent asthma without complication    Bipolar 1 disorder    Anemia    Diffusion capacity of lung (dl), decreased    Hypertriglyceridemia    Anxiety    Morbid obesity with body mass index (BMI) of 45.0 to 49.9 in adult    Type 2 diabetes mellitus with hyperglycemia, without long-term current use of insulin    Iron deficiency anemia    Screen for colon cancer    Schizoaffective disorder, bipolar type    Vitamin D deficiency    Fibromyalgia    Hypertension complicating diabetes    Dyslipidemia associated with type 2 diabetes mellitus    Seasonal allergic rhinitis due to pollen    Nocturnal hypoxemia due to obesity    Obesity hypoventilation syndrome    Moderate persistent asthma without complication         /76   Pulse 87   Resp 18   Ht 4' 8" (1.422 m)   Wt 89.4 kg (197 lb 1.5 oz)   SpO2 (!) 94%   BMI 44.19 kg/m²   Body mass index is 44.19 kg/m².    Review of Systems   Respiratory: Positive for snoring.    Neurological: Positive for headaches.   Psychiatric/Behavioral: Positive for sleep disturbance.   All other systems reviewed and are negative.    Objective:      Physical Exam   Constitutional: She is oriented to " "person, place, and time. She appears well-developed and well-nourished.   Obese   HENT:   Head: Normocephalic and atraumatic.   Mouth/Throat: Oropharynx is clear and moist.   Neck: Normal range of motion. Neck supple.   Cardiovascular: Normal rate and regular rhythm. Exam reveals no gallop.   No murmur heard.  Pulmonary/Chest: Effort normal and breath sounds normal.   Abdominal: Soft. She exhibits no mass. There is no tenderness.   Musculoskeletal: Normal range of motion. She exhibits no edema.   Neurological: She is alert and oriented to person, place, and time.   Skin: Skin is warm and dry.   Psychiatric: She has a normal mood and affect.     Personal Diagnostic Review  5/21/19  The apnea/hypopnea index (AHI) was 2.8 events/hour. The central sleep apnea index was 0.0  events/hour. The REM AHI was 10.0 events/hour and NREM AHI was 2.6 events/hour. The supine AHI was 4.7  events/hour and the non supine AHI was 1.71 supine during 37.87% of sleep. Respiratory disturbances were  associated with oxygen desaturation down to a judit of 82.0% during sleep. The mean oxygen saturation during the  study was 90.0%. The cumulative time under 88% oxygen saturation was 31.7 minutes.        Assessment:       1. Moderate persistent asthma without complication    2. Nocturnal hypoxemia due to obesity    3. Obesity hypoventilation syndrome        Outpatient Encounter Medications as of 6/3/2019   Medication Sig Dispense Refill    albuterol (PROVENTIL/VENTOLIN HFA) 90 mcg/actuation inhaler Inhale 2 puffs into the lungs every 4 (four) hours as needed. 8.5 g 5    alprazolam (XANAX) 1 MG tablet Take 1 mg by mouth 3 (three) times daily as needed.      aspirin (ECOTRIN) 81 MG EC tablet Take 81 mg by mouth once daily.      atorvastatin (LIPITOR) 20 MG tablet Take 1 tablet (20 mg total) by mouth every evening. 90 tablet 3    BD INSULIN SYRINGE ULTRA-FINE 1/2 mL 30 gauge x 1/2" Syrg   0    blood sugar diagnostic Strp Check blood glucose " 3 times daily as directed and as needed (dispense insurance preferred brand or patient choice) 100 each 11    blood-glucose meter Misc use 3 times daily as directed 1 each 0    [START ON 7/25/2019] cholecalciferol, vitamin D3, (VITAMIN D3) 2,000 unit Tab Take 1 tablet (2,000 Units total) by mouth once daily. 30 tablet 33    cycloSPORINE (RESTASIS) 0.05 % ophthalmic emulsion Place 0.4 mLs (1 drop total) into both eyes 2 (two) times daily. 60 each 11    duloxetine (CYMBALTA) 60 MG capsule Take 60 mg by mouth 2 (two) times daily.  2    empagliflozin (JARDIANCE) 10 mg Tab Take 1 tablet (10 mg) by mouth every morning. 30 tablet 5    ergocalciferol (ERGOCALCIFEROL) 50,000 unit Cap Take 1 capsule (50,000 Units total) by mouth every 7 days. for 12 doses 12 capsule 0    fish oil-omega-3 fatty acids 300-1,000 mg capsule Take 1 capsule by mouth once daily.      fluticasone propionate (FLONASE) 50 mcg/actuation nasal spray 2 sprays (100 mcg total) by Each Nare route once daily. 16 g 11    fluticasone-salmeterol diskus inhaler 250-50 mcg INHALE 1 PUFF INTO THE LUNGS 2 (TWO) TIMES DAILY *RINSE MOUTH AFTER EACH USE* 60 each 11    gabapentin (NEURONTIN) 100 MG capsule Take 1 capsule (100 mg total) by mouth 3 (three) times daily. 90 capsule 3    insulin detemir U-100 (LEVEMIR FLEXTOUCH) 100 unit/mL (3 mL) SubQ InPn pen Inject 70 Units into the skin every evening. 30 mL 3    lancets 28 gauge Misc use as directed 3 times daily 100 each 11    LINZESS 145 mcg Cap capsule Take 145 mcg by mouth as needed.   3    liraglutide 0.6 mg/0.1 mL, 18 mg/3 mL, subq PNIJ (VICTOZA 2-MILENA) 0.6 mg/0.1 mL (18 mg/3 mL) PnIj Use 1.8mg daily 9 mL 3    losartan-hydrochlorothiazide 100-25 mg (HYZAAR) 100-25 mg per tablet Take 1 tablet by mouth once daily. 90 tablet 3    lurasidone (LATUDA) 60 mg Tab tablet Take 60 mg by mouth once daily.      metFORMIN (GLUCOPHAGE) 1000 MG tablet TAKE 1 TABLET (1,000 MG TOTAL) BY MOUTH 2 (TWO) TIMES DAILY.  "180 tablet 11    metoprolol succinate (TOPROL-XL) 100 MG 24 hr tablet Take 1 tablet (100 mg total) by mouth once daily. 30 tablet 5    mirtazapine (REMERON) 45 MG tablet Take 45 mg by mouth every evening.      montelukast (SINGULAIR) 10 mg tablet Take 1 tablet (10 mg total) by mouth nightly. 30 tablet 11    MULTIVIT,CALC,MINS/IRON/FOLIC (DAILY MULTIPLE FOR WOMEN ORAL) Take 1 tablet by mouth once daily.      naproxen (NAPROSYN) 500 MG tablet Take 1 tablet (500 mg total) by mouth 2 (two) times daily as needed (FOR FIBROMYALGIA PAIN). 60 tablet 3    nitroGLYCERIN (NITROSTAT) 0.4 MG SL tablet Dissolve 1 tablet under the tongue every 5 minutes as needed, up to 3 times. If chest pain persists, call 911.      pen needle, diabetic (BD ULTRA-FINE MINI PEN NEEDLE) 31 gauge x 3/16" Ndle Use twice a day 100 each 3    promethazine (PHENERGAN) 25 MG tablet Take 1 tablet (25 mg total) by mouth every 4 to 6 hours as needed for Nausea. sedating 20 tablet 0    verapamil (CALAN) 120 MG tablet Take 1 tablet (120 mg total) by mouth 2 (two) times daily. 60 tablet 11    zolpidem (AMBIEN) 10 mg Tab Take 10 mg by mouth nightly as needed.       No facility-administered encounter medications on file as of 6/3/2019.      Orders Placed This Encounter   Procedures    OXYGEN FOR HOME USE     Order Specific Question:   Liter Flow     Answer:   2     Order Specific Question:   Duration     Answer:   With sleep     Order Specific Question:   Qualifying SpO2:     Answer:   judit of 82.0% during sleep. The mean oxygen saturation during the     Comments:   5/21/19 sleep study     Order Specific Question:   Testing done at:     Answer:   Sleep     Order Specific Question:   Route     Answer:   nasal cannula     Order Specific Question:   Portable mode:     Answer:   continuous     Order Specific Question:   Device     Answer:   home concentrator     Order Specific Question:   Length of need (in months):     Answer:   99 mos     Order Specific " Question:   Patient condition with qualifying saturation     Answer:   other chronic pulmonary condition     Order Specific Question:   Height:     Answer:   .     Order Specific Question:   Weight:     Answer:   .     Order Specific Question:   Alternative treatment measures have been tried or considered and deemed clinically ineffective.     Answer:   Yes     Plan:        Problem List Items Addressed This Visit        Pulmonary    Moderate persistent asthma without complication - Primary    Overview     Advair         Relevant Orders    OXYGEN FOR HOME USE       Other    Nocturnal hypoxemia due to obesity    Current Assessment & Plan     Oxygen ordered.         Relevant Orders    OXYGEN FOR HOME USE    Obesity hypoventilation syndrome    Overview     Requires oxygen with sleep. Weight loss and exercise to improve overall health.           Relevant Orders    OXYGEN FOR HOME USE        Answers for HPI/ROS submitted by the patient on 6/3/2019   Asthma  In the past 4 weeks, how much of the time did your asthma keep you from getting as much done at work, school, or at home?: none of the time  During the past 4 weeks, how often have you had shortness of breath?: not at all  During the past 4 weeks, how often did your asthma symptoms (Wheezing, coughing, shortness of breath, chest tightness or pain) wake you up at night or earlier that usual in the morning?: not at all  During the past 4 weeks, how often have you used your rescue inhaler or nebulizer medication (such as albuterol)?: not at all  How would you rate your asthma control during the past 4 weeks?: well controlled   : 24

## 2019-06-03 NOTE — PATIENT INSTRUCTIONS
Oxygen has been ordered. Please call or send message if you do not hear from anyone in 72 hrs for set up.

## 2019-06-04 ENCOUNTER — PATIENT OUTREACH (OUTPATIENT)
Dept: OTHER | Facility: OTHER | Age: 49
End: 2019-06-04

## 2019-06-04 DIAGNOSIS — I10 ESSENTIAL HYPERTENSION: Primary | Chronic | ICD-10-CM

## 2019-06-04 RX ORDER — VALSARTAN AND HYDROCHLOROTHIAZIDE 320; 25 MG/1; MG/1
1 TABLET, FILM COATED ORAL DAILY
Qty: 30 TABLET | Refills: 2 | Status: SHIPPED | OUTPATIENT
Start: 2019-06-04 | End: 2019-08-13 | Stop reason: SDUPTHER

## 2019-06-04 NOTE — PROGRESS NOTES
Ms. Guerrero is newly enrolled in the Hypertension Digital Medicine program.     PMH: DM, diabetic polyneuropathy, migraines, bipolar, schizoaffective, anxiety, asthma, HTN, HLD, anemia, GERD, ALEN,   Patient currently prescribed losartan/HCTZ 100/25 QD, metoprolol succinate 100 mg QD, verapamil 120 mg BID for treatment of HTN.     Verified drug allergies. Reconciled medication regimen.     ALEN screening results for this patient suggest a high likelihood of sleep apnea, which can contribute to hypertension and hyperglycemia. Patient has a CPAP and is about to go on home oxygen at night. CPAP is worn every night - averaging 6-7 hours/night.      Patient reports depression symptoms. She states she just got out of the hospital in April - ongoing depression. Patient states she may withdraw....    Reviewed program goals and monitoring frequency. Explained that we expect patient to obtain several blood pressures per week at random times of the day. Confirmed appropriate BP monitoring technique. Reviewed signs and symptoms of hypertension (HA, CP, SOB, etc.) and hypotension (dizziness, weakness, fatigue, etc).     Patient's BP average is 153/96 which is above her goal of <130/80, per 2017 ACC/AHA HTN guidelines. Patient attributes high BP readings to pain - fibromyalgia.     Last 5 Patient Entered Readings                                      Current 30 Day Average: 153/96     Recent Readings 5/29/2019 5/24/2019 5/24/2019 5/24/2019 5/20/2019    SBP (mmHg) 152 160 158 180 147    DBP (mmHg) 102 73 105 113 90    Pulse 94 94 94 95 81        Plan:  Discontinue losartan/HCTZ, initiate valsartan 320 mg/HCTZ 25 mg QD    Will continue to monitor and will reach out again in 2-3 weeks, or sooner if BP trend shows a need.

## 2019-06-05 ENCOUNTER — CLINICAL SUPPORT (OUTPATIENT)
Dept: REHABILITATION | Facility: HOSPITAL | Age: 49
End: 2019-06-05
Attending: INTERNAL MEDICINE
Payer: MEDICAID

## 2019-06-05 DIAGNOSIS — G89.29 CHRONIC BILATERAL LOW BACK PAIN WITHOUT SCIATICA: ICD-10-CM

## 2019-06-05 DIAGNOSIS — G89.29 CHRONIC NECK PAIN: Primary | ICD-10-CM

## 2019-06-05 DIAGNOSIS — M54.50 CHRONIC BILATERAL LOW BACK PAIN WITHOUT SCIATICA: ICD-10-CM

## 2019-06-05 DIAGNOSIS — M54.2 CHRONIC NECK PAIN: Primary | ICD-10-CM

## 2019-06-05 PROCEDURE — 97110 THERAPEUTIC EXERCISES: CPT

## 2019-06-05 NOTE — PROGRESS NOTES
Physical Therapy Daily Treatment Note     Name: Yolanda [facility]  Clinic Number: [id number]    Therapy Diagnosis:   Encounter Diagnoses   Name Primary?    Chronic neck pain Yes    Chronic bilateral low back pain without sciatica      Physician: Abilio Gibbs MD    Visit Date: 6/5/2019    Physician Orders: PT Eval and Treat  Medical Diagnosis: OA, unspecified type and site  Evaluation Date: 5/23/19  Authorization Period Expiration: 6/30/19  Plan of Care Certification Period: 6/23/19  Visit #/Visits authorized: 3/ 16     Time In: 3:00  Time Out: 4:10  Total Billable Time: 50 minutes    Precautions: Diabetes    Subjective     Pt reports: that she had improvement in pain after last session and her pain at rest has improved.  She was compliant with home exercise program.  Response to previous treatment: good  Functional change: improvement in resting pain    Pain: 6/10  Location: bilateral back      Objective     Yolanda received therapeutic exercises to develop strength, endurance and flexibility for 45 minutes including:  Treadmill x 5 min  Nustep x 5 min  UE bike x 5 min  DKTC with ball  LTR with ball  PPT with march  Incline weight shift  Isometric shoulder AB/FL/EX  Table slides into FL/AB    Yolanda received the following manual therapy techniques: Joint mobilizations were applied to the: shoulder for 5 minutes, including:  Not performed today    Yolanda participated in neuromuscular re-education activities to improve: Posture for 0 minutes. The following activities were included:  Not performed today    Yolanda received the following supervised modalities after being cleared for contradictions: TENS:  Yolanda received TENS electrical stimulation for pain to the lumbar spine. Pt received continuous mode for 0 minutes. Yolanda tolerated treatment well without any adverse effects.     Yolanda received hot pack for 15 minutes to lumbar spine.    Home Exercises Provided and Patient Education Provided     Education provided:   -  continuing with cardio    Written Home Exercises Provided: Patient instructed to cont prior HEP.  Exercises were reviewed and Yolanda was able to demonstrate them prior to the end of the session.  Yolanda demonstrated good  understanding of the education provided.     See EMR under Patient Instructions for exercises provided prior visit.    Assessment     Patient demonstrated good tolerance to light strengthening and mobility work with minimal increase in adverse symptoms. Patient was able to tolerate gentle shoulder joint mobs this session for pain relief. Patient reported improvement in symptoms by the end of the session.    Yolanda is progressing well towards her goals.   Pt prognosis is Good.     Pt will continue to benefit from skilled outpatient physical therapy to address the deficits listed in the problem list box on initial evaluation, provide pt/family education and to maximize pt's level of independence in the home and community environment.     Pt's spiritual, cultural and educational needs considered and pt agreeable to plan of care and goals.     Anticipated barriers to physical therapy: mental health, chronicity of pain    Goals: improve tolerance to activity, improve mobility    Plan     Continue with strengthening, mobility, and cardio and progress slowly each session    Mikie Castano, PT

## 2019-06-06 ENCOUNTER — CLINICAL SUPPORT (OUTPATIENT)
Dept: CARDIOLOGY | Facility: CLINIC | Age: 49
End: 2019-06-06
Attending: INTERNAL MEDICINE
Payer: MEDICAID

## 2019-06-06 DIAGNOSIS — I10 ESSENTIAL HYPERTENSION: Chronic | ICD-10-CM

## 2019-06-06 LAB
DIASTOLIC DYSFUNCTION: YES
RETIRED EF AND QEF - SEE NOTES: 60 (ref 55–65)

## 2019-06-06 PROCEDURE — 93306 2D ECHO WITH COLOR FLOW DOPPLER: ICD-10-PCS | Mod: 26,S$PBB,, | Performed by: INTERNAL MEDICINE

## 2019-06-06 PROCEDURE — 93306 TTE W/DOPPLER COMPLETE: CPT | Mod: PBBFAC | Performed by: INTERNAL MEDICINE

## 2019-06-07 ENCOUNTER — PATIENT OUTREACH (OUTPATIENT)
Dept: OTHER | Facility: OTHER | Age: 49
End: 2019-06-07

## 2019-06-07 ENCOUNTER — TELEPHONE (OUTPATIENT)
Dept: CARDIOLOGY | Facility: CLINIC | Age: 49
End: 2019-06-07

## 2019-06-07 RX ORDER — LOSARTAN POTASSIUM AND HYDROCHLOROTHIAZIDE 25; 100 MG/1; MG/1
1 TABLET ORAL DAILY
Qty: 90 TABLET | Refills: 3 | OUTPATIENT
Start: 2019-06-07 | End: 2020-06-06

## 2019-06-07 RX ORDER — ERGOCALCIFEROL 1.25 MG/1
50000 CAPSULE ORAL
Qty: 12 CAPSULE | Refills: 0 | OUTPATIENT
Start: 2019-06-07 | End: 2019-08-24

## 2019-06-07 NOTE — TELEPHONE ENCOUNTER
S/W Pt and let pt know that She does not need a f/u for that this is related to her weight diabetes sleep apnea and blood pressure.she needs to work on all that. Pt verbalized of all understanding.

## 2019-06-07 NOTE — PROGRESS NOTES
Last 5 Patient Entered Readings                                      Current 30 Day Average: 153/96     Recent Readings 5/29/2019 5/24/2019 5/24/2019 5/24/2019 5/20/2019    SBP (mmHg) 152 160 158 180 147    DBP (mmHg) 102 73 105 113 90    Pulse 94 94 94 95 81        Digital Medicine: Health  Follow Up    Lifestyle Modifications:  Dietary Modifications (Sodium intake <2,000mg/day, food labels, dining out): deferred    Physical Activity:   Patient states that she hasn't been able to start walking yet since because of the weather and she is still getting adjusted to her new job.  I encouraged the patient to start trying to walk even if it is just once a week starting out.     Medication Therapy:   Patient has been compliant with the medication regimen.    Patient has the following medication side effects/concerns:   (Frequency/Alleviating factors/Precipitating factors, etc.)   Patient states that shes been feeling sluggish and fatigued since the recent medication change to valsartan-HCTZ two days ago.   I mentioned to the patient that new medications can have an adjustment period in the body and that I would make mention of it in her Chart and to PharmERVIN Carmichael.    Follow up with Ms. Yolanda Martin Alpesh completed. No further questions or concerns. Will continue to follow up to achieve health goals.    Notes:  Will f/u with patient on s/s of new med, diet, water/caffiene intake, etc. At next outreach.WCB in 3 weeks.

## 2019-06-07 NOTE — TELEPHONE ENCOUNTER
S/W Pt and notified pt that echo results showed Heart function normal and Mildly stiff heart. Pt has questions and concerns about mildly stiff heart and what it means, what could occur and etc. I stated to pt that I would make a follow up with [facility] level for them to discuss test results with pt.//rf

## 2019-06-07 NOTE — TELEPHONE ENCOUNTER
----- Message from Ermias Mccormick MD sent at 6/6/2019  8:08 PM CDT -----  Heart function normal   Mildly stiff heart

## 2019-06-11 ENCOUNTER — TELEPHONE (OUTPATIENT)
Dept: CARDIOLOGY | Facility: CLINIC | Age: 49
End: 2019-06-11

## 2019-06-11 NOTE — TELEPHONE ENCOUNTER
----- Message from Ermias Mccormick MD sent at 6/10/2019  7:17 PM CDT -----  Very few skipped beats no significant rhythm issues.

## 2019-06-13 ENCOUNTER — CLINICAL SUPPORT (OUTPATIENT)
Dept: DIABETES | Facility: CLINIC | Age: 49
End: 2019-06-13
Payer: MEDICAID

## 2019-06-13 VITALS — HEIGHT: 56 IN | WEIGHT: 194.88 LBS | BODY MASS INDEX: 43.84 KG/M2

## 2019-06-13 DIAGNOSIS — E11.65 UNCONTROLLED TYPE 2 DIABETES MELLITUS WITH HYPERGLYCEMIA: Primary | ICD-10-CM

## 2019-06-13 PROCEDURE — 99999 PR PBB SHADOW E&M-EST. PATIENT-LVL IV: CPT | Mod: PBBFAC,,, | Performed by: DIETITIAN, REGISTERED

## 2019-06-13 PROCEDURE — G0108 DIAB MANAGE TRN  PER INDIV: HCPCS | Mod: PBBFAC | Performed by: DIETITIAN, REGISTERED

## 2019-06-13 PROCEDURE — 99214 OFFICE O/P EST MOD 30 MIN: CPT | Mod: PBBFAC | Performed by: DIETITIAN, REGISTERED

## 2019-06-13 PROCEDURE — 99999 PR PBB SHADOW E&M-EST. PATIENT-LVL IV: ICD-10-PCS | Mod: PBBFAC,,, | Performed by: DIETITIAN, REGISTERED

## 2019-06-13 NOTE — PROGRESS NOTES
Diabetes Education  Author: Yara Sanchez RD, CDE  Date: 6/13/2019    Diabetes Care Management Summary  Diabetes Education Record Assessment/Progress: Initial  Current Diabetes Risk Level: Moderate     Last A1c:   Lab Results   Component Value Date    HGBA1C 8.3 (H) 12/22/2017     Last visit with Diabetes Educator: Cal Centeno 6/13/19    Diabetes Type  Diabetes Type : Type II    Diabetes History  Diabetes Diagnosis: >10 years(>20 yrs)  Current Treatment: Diet, Exercise, Oral Medication, Injectable, Insulin(Metformin 1000mg 1tab twice daily, jardiance 10mg daily, victoza 1.8 mg daily, levemir 70 units daily)  Reviewed Problem List with Patient: Yes    Health Maintenance was reviewed today with patient. Discussed with patient importance of routine eye exams, foot exams/foot care, blood work (i.e.: A1c, microalbumin, and lipid), dental visits, yearly flu vaccine, and pneumonia vaccine as indicated by PCP. Patient verbalized understanding.     Health Maintenance Topics with due status: Not Due       Topic Last Completion Date    TETANUS VACCINE 12/21/2017    Colonoscopy 02/28/2018    Influenza Vaccine 10/01/2018    Hemoglobin A1c 04/12/2019    Lipid Panel 04/25/2019    Mammogram 05/02/2019    Foot Exam 05/16/2019    Eye Exam 05/22/2019     There are no preventive care reminders to display for this patient.    Nutrition  Meal Planning: skipping meals(~6136-4345 cals/d - excess carb, fat and sodium w/ excess from irregular meals, dining out (fast foods). )  Meal Plan 24 Hour Recall - Breakfast: 2pkt instant oatmeal - water  Meal Plan 24 Hour Recall - Lunch: cereal (frosted flakes), 2% OR fast foods- sugary marco, water  Meal Plan 24 Hour Recall - Dinner: lean cuisine OR instant oats  Meal Plan 24 Hour Recall - Snack: fruit; marco: mostly water    Monitoring   Self Monitoring :    Blood Glucose Logs: No  In the last month, how often have you had a low blood sugar reaction?: never   Per recall, fst -140, ac  180-190.    Exercise   Frequency: Never    Current Diabetes Treatment   Current Treatment: Diet, Exercise, Oral Medication, Injectable, Insulin(Metformin 1000mg 1tab twice daily, jardiance 10mg daily, victoza 1.8 mg daily, levemir 70 units daily)    Social History  Preferred Learning Method: Face to Face  Primary Support: Self  Smoking Status: Never a Smoker  Alcohol Use: Rarely     DDS-2 Score  ( > 3 = SIGNIFICANT DISTRESS): 3  DDS Score  ( > 3 = SIGNIFICANT DISTRESS): 1.82  Emotional [place] Score: 1.6  Physician-Related Distress: 1  Regimen-Related Distress: 2.2  Interpersonal Distress: 2.67    Barriers to Change  Barriers to Change: None  Learning Challenges : None    Readiness to Learn   Readiness to Learn : Eager    Cultural Influences  Cultural Influences: No    Diabetes Education Assessment/Progress  Diabetes Disease Process (diabetes disease process and treatment options): Discussion, Individual Session, Demonstrates Understanding/Competency(verbalizes/demonstrates), Written Materials Provided  Nutrition (Incorporating nutritional management into one's lifestyle): Discussion, Individual Session, Demonstrates Understanding/Competency (verbalizes/demonstrates), Written Materials Provided  Physical Activity (incorporating physical activity into one's lifestyle): Discussion, Individual Session, Demonstrates Understanding/Competency (verbalizes/demonstrates), Written Materials Provided  Medications (states correct name, dose, onset, peak, duration, side effects & timing of meds): Discussion, Individual Session, Demonstrates Understanding/Competency(verbalizes/demonstrates), Written Materials Provided  Monitoring (monitoring blood glucose/other parameters & using results): Discussion, Individual Session, Demonstrates Understanding/Competency (verbalizes/demonstrates), Written Materials Provided  Acute Complications (preventing, detecting, and treating acute complications): Discussion, Individual Session,  Demonstrates Understanding/Competency (verbalizes/demonstrates), Written Materials Provided  Chronic Complications (preventing, detecting, and treating chronic complications): Discussion, Individual Session, Demonstrates Understanding/Competency (verbalizes/demonstrates), Written Materials Provided  Clinical (diabetes, other pertinent medical history, and relevant comorbidities reviewed during visit): Discussion, Individual Session, Demonstrates Understanding/Competency (verbalizes/demonstrates)  Cognitive (knowledge of self-management skills, functional health literacy): Discussion, Individual Session, Demonstrates Understanding/Competency (verbalizes/demonstrates)  Psychosocial (emotional response to diabetes): Discussion, Individual Session, Demonstrates Understanding/Competency (verbalizes/demonstrates)  Diabetes Distress and Support Systems: Discussion, Individual Session, Demonstrates Understanding/Competency (verbalizes/demonstrates)  Behavioral (readiness for change, lifestyle practices, self-care behaviors): Discussion, Individual Session, Demonstrates Understanding/Competency (verbalizes/demonstrates)    Goals  Patient has selected/evaluated goals during today's session: Yes, selected  Healthy Eating: Set(use meal plan - carb portions/spacing)  Start Date: 06/13/19  Target Date: 07/08/19  Monitoring: Set(test BG 2x/d -fst, 2hr pp/bed; bring meter to clinic)  Start Date: 06/13/19  Target Date: 07/08/19     Diabetes Care Plan/Intervention  Education Plan/Intervention: Individual Follow-Up DSMT, Endocrine Provider Visit Set Up(Maintain visits w/ Dr. Hou)    Diabetes Meal Plan  Restrictions: Low Fat, Low Sodium  Calories: 1400  Carbohydrate Per Meal: 30-45g  Carbohydrate Per Snack : 15-30g    Today's Self-Management Care Plan was developed with the patient's input and is based on barriers identified during today's assessment.    The long and short-term goals in the care plan were written with the  patient/caregiver's input. The patient has agreed to work toward these goals to improve her overall diabetes control.      The patient received a copy of today's self-management plan and verbalized understanding of the care plan, goals, and all of today's instructions.      The patient was encouraged to communicate with her physician and care team regarding her condition(s) and treatment.  I provided the patient with my contact information today and encouraged her to contact me via phone or patient portal as needed.     Education Units of Time   Time Spent: 60 min

## 2019-06-13 NOTE — LETTER
June 13, 2019        DOMINIK Beach MD  [address] [address] [facility]  [place] [address]         [facility]  [address] [address] [facility] [address] [place] [address]  Phone: [phone]  Fax: [phone]   Patient: Yolanda Betts   MR Number: [id number]   YOB: 1970   Date of Visit: 6/13/2019       Dear Dr. Beach:    Thank you for referring Yolanda Betts to me for evaluation. Below are the relevant portions of my assessment and plan of care.    If you have questions, please do not hesitate to call me. I look forward to following Yolanda along with you.    Sincerely,      Yara Sanchez RD, CDE           CC  Malka Severino MD

## 2019-06-18 ENCOUNTER — PATIENT MESSAGE (OUTPATIENT)
Dept: ENDOCRINOLOGY | Facility: CLINIC | Age: 49
End: 2019-06-18

## 2019-06-18 ENCOUNTER — PATIENT OUTREACH (OUTPATIENT)
Dept: OTHER | Facility: OTHER | Age: 49
End: 2019-06-18

## 2019-06-18 NOTE — PROGRESS NOTES
Per 30 day average, 152/97 mmHg, patient's BP is not at goal.     Patient denies s/s of hypotension (lightheadedness, dizziness, nausea, fatigue) associated with low readings. Instructed patient to inform me if this occurs, patient confirms understanding.      Patient denies s/s of hypertension (SOB, CP, severe headaches, changes in vision) associated with high readings. Instructed patient to go to the ED if BP > 180/110 and accompanied by hypertensive s/s, patient confirms understanding.    Last 5 Patient Entered Readings                                      Current 30 Day Average: 152/97     Recent Readings 6/15/2019 6/7/2019 5/29/2019 5/24/2019 5/24/2019    SBP (mmHg) 132 168 152 160 158    DBP (mmHg) 83 101 102 73 105    Pulse 99 95 94 94 94        Current HTN regimen:  Hypertension Medications             metoprolol succinate (TOPROL-XL) 100 MG 24 hr tablet Take 1 tablet (100 mg total) by mouth once daily.    nitroGLYCERIN (NITROSTAT) 0.4 MG SL tablet Dissolve 1 tablet under the tongue every 5 minutes as needed, up to 3 times. If chest pain persists, call 911.    valsartan-hydrochlorothiazide (DIOVAN-HCT) 320-25 mg per tablet Take 1 tablet by mouth once daily.    verapamil (CALAN) 120 MG tablet Take 1 tablet (120 mg total) by mouth 2 (two) times daily.          2 weeks since medication change from losartan to valsartan - patient states took almost full two weeks to adjust and not feel so fatigued. She is now feeling better and states she feels overall much improved.     Will continue to monitor regularly. Will follow up in 4-6 weeks, sooner if BP begins to trend upward or downward. Patient was encouraged to submit more frequent BP readings to ensure her BP is responding to change well and no further changes are needed.     Asked patient to call or message with questions or concerns.

## 2019-06-19 ENCOUNTER — TELEPHONE (OUTPATIENT)
Dept: ENDOCRINOLOGY | Facility: CLINIC | Age: 49
End: 2019-06-19

## 2019-06-19 NOTE — TELEPHONE ENCOUNTER
----- Message from Caleb Lee sent at 6/19/2019  8:50 AM CDT -----  Contact: [phone]  Would like a nurse to contact her regarding orders for diabetic shoes states she has called several times, and not received a response yet, please contact her @ [phone]. Thanks

## 2019-06-19 NOTE — TELEPHONE ENCOUNTER
Informed pt that provider sent her a message on the portal in regards to the prescription she was requesting. Pt gave the name and number to company which is [employer] [phone]. Advised that we will reach out to company to see what all is needed for pt to receive diabetic shoes and inserts. Pt voiced understanding.

## 2019-06-20 ENCOUNTER — TELEPHONE (OUTPATIENT)
Dept: ENDOCRINOLOGY | Facility: CLINIC | Age: 49
End: 2019-06-20

## 2019-06-20 NOTE — TELEPHONE ENCOUNTER
Informed pt that prescription for diabetic shoe and inserts has been sent to [facility]. Pt voiced understanding.

## 2019-06-20 NOTE — PROGRESS NOTES
Please fax the diabetic shoe order and my last clinic note to her orthotic company that she gave the information on

## 2019-06-24 DIAGNOSIS — E55.9 VITAMIN D INSUFFICIENCY: ICD-10-CM

## 2019-06-26 ENCOUNTER — PATIENT OUTREACH (OUTPATIENT)
Dept: OTHER | Facility: OTHER | Age: 49
End: 2019-06-26

## 2019-06-26 NOTE — PROGRESS NOTES
Last 5 Patient Entered Readings                                      Current 30 Day Average: 143/92     Recent Readings 6/23/2019 6/18/2019 6/15/2019 6/7/2019 5/29/2019    SBP (mmHg) 131 133 132 168 152    DBP (mmHg) 88 86 83 101 102    Pulse 97 97 99 95 94          Digital Medicine: Health  Follow Up    Left voicemail to follow up with Ms. Yolanda Melanievivi Alpesh.  Current BP average 143/92 mmHg is not at goal, 130/80.  Patinet BP is trending downward. WCB in 2-3 weeks.

## 2019-07-01 RX ORDER — ERGOCALCIFEROL 1.25 MG/1
50000 CAPSULE ORAL
Qty: 12 CAPSULE | Refills: 0 | OUTPATIENT
Start: 2019-07-01 | End: 2019-09-17

## 2019-07-01 NOTE — TELEPHONE ENCOUNTER
After you complete the prescription I gave you for 12 weeks of vitamin D2, you should then start taking over-the-counter vitamin D3 2000 IU daily.  Nature Made® is a brand of supplements that I have found trustworthy and reasonably priced.

## 2019-07-08 ENCOUNTER — NUTRITION (OUTPATIENT)
Dept: DIABETES | Facility: CLINIC | Age: 49
End: 2019-07-08
Payer: MEDICAID

## 2019-07-08 ENCOUNTER — OFFICE VISIT (OUTPATIENT)
Dept: PULMONOLOGY | Facility: CLINIC | Age: 49
End: 2019-07-08
Payer: MEDICAID

## 2019-07-08 ENCOUNTER — CLINICAL SUPPORT (OUTPATIENT)
Dept: PULMONOLOGY | Facility: CLINIC | Age: 49
End: 2019-07-08
Payer: MEDICAID

## 2019-07-08 VITALS — HEIGHT: 56 IN | BODY MASS INDEX: 45.27 KG/M2 | WEIGHT: 201.25 LBS

## 2019-07-08 VITALS
RESPIRATION RATE: 18 BRPM | DIASTOLIC BLOOD PRESSURE: 76 MMHG | OXYGEN SATURATION: 96 % | HEIGHT: 56 IN | HEART RATE: 79 BPM | BODY MASS INDEX: 45.27 KG/M2 | WEIGHT: 201.25 LBS | SYSTOLIC BLOOD PRESSURE: 128 MMHG

## 2019-07-08 DIAGNOSIS — E66.2 OBESITY HYPOVENTILATION SYNDROME: ICD-10-CM

## 2019-07-08 DIAGNOSIS — I10 ESSENTIAL HYPERTENSION: Chronic | ICD-10-CM

## 2019-07-08 DIAGNOSIS — E66.01 MORBID OBESITY WITH BODY MASS INDEX (BMI) OF 45.0 TO 49.9 IN ADULT: Chronic | ICD-10-CM

## 2019-07-08 DIAGNOSIS — J45.30 MILD PERSISTENT ASTHMA WITHOUT COMPLICATION: Primary | Chronic | ICD-10-CM

## 2019-07-08 DIAGNOSIS — J45.30 MILD PERSISTENT ASTHMA WITHOUT COMPLICATION: Chronic | ICD-10-CM

## 2019-07-08 DIAGNOSIS — G47.36 NOCTURNAL HYPOXEMIA DUE TO OBESITY: ICD-10-CM

## 2019-07-08 DIAGNOSIS — E66.9 NOCTURNAL HYPOXEMIA DUE TO OBESITY: ICD-10-CM

## 2019-07-08 DIAGNOSIS — E78.1 HYPERTRIGLYCERIDEMIA: Chronic | ICD-10-CM

## 2019-07-08 DIAGNOSIS — R94.2 DIFFUSION CAPACITY OF LUNG (DL), DECREASED: Chronic | ICD-10-CM

## 2019-07-08 DIAGNOSIS — E11.65 UNCONTROLLED TYPE 2 DIABETES MELLITUS WITH HYPERGLYCEMIA: Primary | ICD-10-CM

## 2019-07-08 PROBLEM — J45.40 MODERATE PERSISTENT ASTHMA WITHOUT COMPLICATION: Status: RESOLVED | Noted: 2019-06-03 | Resolved: 2019-07-08

## 2019-07-08 LAB
BRPFT: ABNORMAL
DLCO ADJ PRE: 12.99 ML/(MIN*MMHG) (ref 13.89–25.36)
DLCO SINGLE BREATH LLN: 13.89
DLCO SINGLE BREATH PRE REF: 66.2 %
DLCO SINGLE BREATH REF: 19.63
DLCOC SBVA LLN: 3.28
DLCOC SBVA PRE REF: 91.3 %
DLCOC SBVA REF: 5.48
DLCOC SINGLE BREATH LLN: 13.89
DLCOC SINGLE BREATH PRE REF: 66.2 %
DLCOC SINGLE BREATH REF: 19.63
DLCOVA LLN: 3.28
DLCOVA PRE REF: 91.3 %
DLCOVA PRE: 5 ML/(MIN*MMHG*L) (ref 3.28–7.68)
DLCOVA REF: 5.48
DLVAADJ PRE: 5 ML/(MIN*MMHG*L) (ref 3.28–7.68)
ERV LLN: 0.91
ERV PRE REF: 15.7 %
ERV REF: 0.91
FEF 25 75 LLN: 1.05
FEF 25 75 PRE REF: 71.7 %
FEF 25 75 REF: 2.09
FEV1 FVC LLN: 72
FEV1 FVC PRE REF: 97.3 %
FEV1 FVC REF: 83
FEV1 LLN: 1.4
FEV1 PRE REF: 78.6 %
FEV1 REF: 1.84
FRCPLETH LLN: 1.41
FRCPLETH PREREF: 71.6 %
FRCPLETH REF: 2.23
FVC LLN: 1.71
FVC PRE REF: 80.8 %
FVC REF: 2.22
IVC PRE: 1.71 L (ref 1.71–2.73)
IVC SINGLE BREATH LLN: 1.71
IVC SINGLE BREATH PRE REF: 77.1 %
IVC SINGLE BREATH REF: 2.22
MVV LLN: 62
MVV PRE REF: 53.2 %
MVV REF: 73
PEF LLN: 3.57
PEF PRE REF: 80.9 %
PEF REF: 5.1
PRE DLCO: 12.99 ML/(MIN*MMHG) (ref 13.89–25.36)
PRE ERV: 0.14 L (ref 0.91–0.91)
PRE FEF 25 75: 1.5 L/S (ref 1.05–3.13)
PRE FET 100: 7.5 SEC
PRE FEV1 FVC: 80.4 % (ref 71.73–93.46)
PRE FEV1: 1.44 L (ref 1.4–2.27)
PRE FRC PL: 1.59 L
PRE FVC: 1.8 L (ref 1.71–2.73)
PRE MVV: 39 L/MIN (ref 62.36–84.36)
PRE PEF: 4.12 L/S (ref 3.57–6.63)
PRE RV: 1.5 L (ref 0.75–1.9)
PRE TLC: 3.33 L (ref 2.6–4.57)
RAW LLN: 3.06
RAW PRE REF: 186.2 %
RAW PRE: 5.7 CMH2O*S/L (ref 3.06–3.06)
RAW REF: 3.06
RV LLN: 0.75
RV PRE REF: 113.1 %
RV REF: 1.32
RVTLC LLN: 25
RVTLC PRE REF: 128.7 %
RVTLC PRE: 44.96 % (ref 25.35–44.53)
RVTLC REF: 35
TLC LLN: 2.6
TLC PRE REF: 92.8 %
TLC REF: 3.58
VA PRE: 2.6 L (ref 3.43–3.43)
VA SINGLE BREATH LLN: 3.43
VA SINGLE BREATH PRE REF: 75.7 %
VA SINGLE BREATH REF: 3.43
VC LLN: 1.71
VC PRE REF: 82.3 %
VC PRE: 1.83 L (ref 1.71–2.73)
VC REF: 2.22
VTGRAWPRE: 1.81 L

## 2019-07-08 PROCEDURE — 94726 PULM FUNCT TST PLETHYSMOGRAP: ICD-10-PCS | Mod: 26,S$PBB,, | Performed by: INTERNAL MEDICINE

## 2019-07-08 PROCEDURE — 94726 PLETHYSMOGRAPHY LUNG VOLUMES: CPT | Mod: PBBFAC

## 2019-07-08 PROCEDURE — 94010 BREATHING CAPACITY TEST: ICD-10-PCS | Mod: 26,S$PBB,, | Performed by: INTERNAL MEDICINE

## 2019-07-08 PROCEDURE — 99999 PR PBB SHADOW E&M-EST. PATIENT-LVL IV: CPT | Mod: PBBFAC,,, | Performed by: DIETITIAN, REGISTERED

## 2019-07-08 PROCEDURE — 94726 PLETHYSMOGRAPHY LUNG VOLUMES: CPT | Mod: 26,S$PBB,, | Performed by: INTERNAL MEDICINE

## 2019-07-08 PROCEDURE — 99214 OFFICE O/P EST MOD 30 MIN: CPT | Mod: PBBFAC,27,25 | Performed by: DIETITIAN, REGISTERED

## 2019-07-08 PROCEDURE — 99214 PR OFFICE/OUTPT VISIT, EST, LEVL IV, 30-39 MIN: ICD-10-PCS | Mod: 25,S$PBB,, | Performed by: INTERNAL MEDICINE

## 2019-07-08 PROCEDURE — G0108 DIAB MANAGE TRN  PER INDIV: HCPCS | Mod: PBBFAC | Performed by: DIETITIAN, REGISTERED

## 2019-07-08 PROCEDURE — 99999 PR PBB SHADOW E&M-EST. PATIENT-LVL III: CPT | Mod: PBBFAC,,, | Performed by: INTERNAL MEDICINE

## 2019-07-08 PROCEDURE — 94729 DIFFUSING CAPACITY: CPT | Mod: PBBFAC

## 2019-07-08 PROCEDURE — 94010 BREATHING CAPACITY TEST: CPT | Mod: PBBFAC

## 2019-07-08 PROCEDURE — 99999 PR PBB SHADOW E&M-EST. PATIENT-LVL IV: ICD-10-PCS | Mod: PBBFAC,,, | Performed by: DIETITIAN, REGISTERED

## 2019-07-08 PROCEDURE — 99213 OFFICE O/P EST LOW 20 MIN: CPT | Mod: PBBFAC,25 | Performed by: INTERNAL MEDICINE

## 2019-07-08 PROCEDURE — 99214 OFFICE O/P EST MOD 30 MIN: CPT | Mod: 25,S$PBB,, | Performed by: INTERNAL MEDICINE

## 2019-07-08 PROCEDURE — 94729 DIFFUSING CAPACITY: CPT | Mod: 26,S$PBB,, | Performed by: INTERNAL MEDICINE

## 2019-07-08 PROCEDURE — 99999 PR PBB SHADOW E&M-EST. PATIENT-LVL III: ICD-10-PCS | Mod: PBBFAC,,, | Performed by: INTERNAL MEDICINE

## 2019-07-08 PROCEDURE — 94729 PR C02/MEMBANE DIFFUSE CAPACITY: ICD-10-PCS | Mod: 26,S$PBB,, | Performed by: INTERNAL MEDICINE

## 2019-07-08 PROCEDURE — 94010 BREATHING CAPACITY TEST: CPT | Mod: 26,S$PBB,, | Performed by: INTERNAL MEDICINE

## 2019-07-08 RX ORDER — BENZTROPINE MESYLATE 1 MG/1
1 TABLET ORAL NIGHTLY
Refills: 1 | COMMUNITY
Start: 2019-06-27 | End: 2019-12-09

## 2019-07-08 NOTE — LETTER
July 8, 2019        Abilio Gibbs MD  [address] [place] [address] [address] [place] [address]             [facility]  [address] [facility]  [place] [address]  Phone: [phone]  Fax: [phone]   Patient: Yolanda Betts   MR Number: [id number]   YOB: 1970   Date of Visit: 7/8/2019       Dear Dr. Gibbs:    Thank you for referring Yolanda Betts to me for evaluation. Below are the relevant portions of my assessment and plan of care.     If you have questions, please do not hesitate to call me. I look forward to following Yolanda along with you.    Sincerely,      Yara Sanchez, SOILA, CDE           CC  L. MD Malka Rashid MD

## 2019-07-08 NOTE — PROGRESS NOTES
Diabetes Education  Author: Yara Sanchez RD, CDE  Date: 7/8/2019    Diabetes Care Management Summary  Diabetes Education Record Assessment/Progress: Comprehensive/Group  Current Diabetes Risk Level: Moderate     Last A1c:   Lab Results   Component Value Date    HGBA1C 7.7 (H) 06/24/2019     Initial visit: ROBERTHJohnnieLamonte 6/13/19 w/ A1C 7.7 (6/24/19)    Diabetes Type  Diabetes Type : Type II    Diabetes History  Diabetes Diagnosis: >10 years(>20 yrs)  Current Treatment: Diet, Exercise(Metformin 1000mg 1tab twice daily, jardiance 10mg daily, victoza 1.8 mg daily, levemir 70 units daily)  Reviewed Problem List with Patient: Yes    Health Maintenance was reviewed today with patient. Discussed with patient importance of routine eye exams, foot exams/foot care, blood work (i.e.: A1c, microalbumin, and lipid), dental visits, yearly flu vaccine, and pneumonia vaccine as indicated by PCP. Patient verbalized understanding.     Health Maintenance Topics with due status: Not Due       Topic Last Completion Date    TETANUS VACCINE 12/21/2017    Colonoscopy 02/28/2018    Influenza Vaccine 10/01/2018    Lipid Panel 04/25/2019    Mammogram 05/02/2019    Foot Exam 05/16/2019    Eye Exam 05/22/2019    Hemoglobin A1c 06/24/2019     There are no preventive care reminders to display for this patient.    Nutrition  Meal Planning: (Intake ~3098-7982 cals/d. Pt reducing carb, fat and sodium (fast foods 2x/wk from daily, sugary marco 1x/d from 2-3/d). Inadequate nonstarchy vege. No use MR. )  Meal Plan 24 Hour Recall - Breakfast: 2pkt instant oatmeal - water  Meal Plan 24 Hour Recall - Lunch: red beans, rice OR serops chix plate OR bkd chix, green beans, salad   Meal Plan 24 Hour Recall - Dinner: lean cuisine (2x/wk)   Meal Plan 24 Hour Recall - Snack: fruit OR pretzels    Monitoring   Self Monitoring : Records unavail; pt will bring to next visit. She denies BGs 180-200.  Blood Glucose Logs: No  In the last month, how often have you had a  low blood sugar reaction?: never    Exercise   Frequency: Never    Current Diabetes Treatment   Current Treatment: Diet, Exercise(Metformin 1000mg 1tab twice daily, jardiance 10mg daily, victoza 1.8 mg daily, levemir 70 units daily)    Social History  Preferred Learning Method: Face to Face  Smoking Status: Never a Smoker  Alcohol Use: Rarely       Barriers to Change  Barriers to Change: None  Learning Challenges : None    Cultural Influences  Cultural Influences: No    Diabetes Education Assessment/Progress  Diabetes Disease Process (diabetes disease process and treatment options): Discussion, Individual Session, Demonstrates Understanding/Competency(verbalizes/demonstrates), Written Materials Provided  Nutrition (Incorporating nutritional management into one's lifestyle): Discussion, Individual Session, Demonstrates Understanding/Competency (verbalizes/demonstrates), Written Materials Provided  Physical Activity (incorporating physical activity into one's lifestyle): Discussion, Individual Session, Demonstrates Understanding/Competency (verbalizes/demonstrates), Written Materials Provided  Medications (states correct name, dose, onset, peak, duration, side effects & timing of meds): Discussion, Individual Session, Demonstrates Understanding/Competency(verbalizes/demonstrates), Written Materials Provided  Monitoring (monitoring blood glucose/other parameters & using results): Discussion, Individual Session, Demonstrates Understanding/Competency (verbalizes/demonstrates), Written Materials Provided  Acute Complications (preventing, detecting, and treating acute complications): Discussion, Individual Session, Demonstrates Understanding/Competency (verbalizes/demonstrates), Written Materials Provided  Chronic Complications (preventing, detecting, and treating chronic complications): Discussion, Individual Session, Demonstrates Understanding/Competency (verbalizes/demonstrates), Written Materials Provided  Clinical  (diabetes, other pertinent medical history, and relevant comorbidities reviewed during visit): Discussion, Individual Session, Demonstrates Understanding/Competency (verbalizes/demonstrates)  Cognitive (knowledge of self-management skills, functional health literacy): Discussion, Individual Session, Demonstrates Understanding/Competency (verbalizes/demonstrates)  Psychosocial (emotional response to diabetes): Discussion, Individual Session, Demonstrates Understanding/Competency (verbalizes/demonstrates)  Diabetes Distress and Support Systems: Discussion, Individual Session, Demonstrates Understanding/Competency (verbalizes/demonstrates)  Behavioral (readiness for change, lifestyle practices, self-care behaviors): Discussion, Individual Session, Demonstrates Understanding/Competency (verbalizes/demonstrates)    Goals  Patient has selected/evaluated goals during today's session: Yes, evaluated  Healthy Eating: Set(use meal plan - carb portions/spacing)  Met Percentage : 50%  Start Date: 07/08/19(continue use meal plan - decrease reg marco 3x/wk)  Target Date: 08/08/19  Physical Activity: Set(150min/wk - walking prog)  Start Date: 07/08/19  Target Date: 08/08/19  Monitoring: Set(test BG 2x/d -fst, 2hr pp/bed; bring meter to clinic)  Met Percentage : 50%  Start Date: 08/08/19  Target Date: 08/08/19    Diabetes Care Plan/Intervention  Education Plan/Intervention: Individual Follow-Up DSMT, Endocrine Provider Visit Set Up(Maintain visits w/ Dr. Severino. Will coord repeat A1C 9/24. )    Diabetes Meal Plan  Restrictions: Low Fat, Low Sodium  Calories: 1400  Carbohydrate Per Meal: 30-45g  Carbohydrate Per Snack : 15-30g    Today's Self-Management Care Plan was developed with the patient's input and is based on barriers identified during today's assessment.    The long and short-term goals in the care plan were written with the patient/caregiver's input. The patient has agreed to work toward these goals to improve her overall  diabetes control.      The patient received a copy of today's self-management plan and verbalized understanding of the care plan, goals, and all of today's instructions.      The patient was encouraged to communicate with her physician and care team regarding her condition(s) and treatment.  I provided the patient with my contact information today and encouraged her to contact me via phone or patient portal as needed.     Education Units of Time   Time Spent: 30 min

## 2019-07-08 NOTE — PROGRESS NOTES
Subjective:       Patient ID: Yolanda Betts is a 49 y.o. female.  Patient Active Problem List   Diagnosis    Essential hypertension    GERD (gastroesophageal reflux disease)    Allergic rhinitis    Menopausal syndrome (hot flashes)    Migraines    Abnormal ECG    Psychophysiological insomnia    Sleep-related bruxism    Nightmares    Sleepwalking    Diabetic polyneuropathy associated with type 2 diabetes mellitus    Mild persistent asthma without complication    Bipolar 1 disorder    Anemia    Diffusion capacity of lung (dl), decreased    Hypertriglyceridemia    Anxiety    Morbid obesity with body mass index (BMI) of 45.0 to 49.9 in adult    Type 2 diabetes mellitus with hyperglycemia, without long-term current use of insulin    Iron deficiency anemia    Screen for colon cancer    Schizoaffective disorder, bipolar type    Vitamin D deficiency    Fibromyalgia    Hypertension complicating diabetes    Dyslipidemia associated with type 2 diabetes mellitus    Seasonal allergic rhinitis due to pollen    Nocturnal hypoxemia due to obesity    Obesity hypoventilation syndrome       Chief Complaint: Asthma and Sleep Apnea    Ms. Betts is 49 years old  Follow-up visit.  Last visit 06/03/2019.  Has home oxygen now  Sleep study showed no obstructive sleep apnea  Here to review PFTs  PFT showed normal spirometry, normal lung volumes, isolated decrease in diffusion capacity which corrects for alveolar volume   3 meds: LOSARTAN HCTZ, METOPROLOL, VERAPAMIL  Asthma: ACT score 25.   Will deescalate her therapy to inhaled cortical steroid Asmanex  Immunizations current  Medical regime reviewed:  VENTOLIN  HFA and neb albuterol:  Was started asthma next  Totz sleepiness score is 19:  This is some of her medications Xanax, Cymbalta, Neurontin,  She has had some issues      Asthma   There is no shortness of breath, sputum production or wheezing. Pertinent negatives include no chest pain. Her past  "medical history is significant for asthma.         Review of Systems   Constitutional: Negative for weight gain.   HENT: Negative.    Eyes: Negative.    Respiratory: Negative.  Negative for snoring, sputum production, shortness of breath, wheezing, dyspnea on extertion and use of rescue inhaler.    Cardiovascular: Negative for chest pain.   Genitourinary: Negative.    Endocrine: endocrine negative   Musculoskeletal: Negative.    Skin: Negative.    Gastrointestinal: Negative.    Neurological: Negative.    Psychiatric/Behavioral: Negative for sleep disturbance.   All other systems reviewed and are negative.      Objective:       Vitals:    07/08/19 1403   BP: 128/76   Pulse: 79   Resp: 18   SpO2: 96%   Weight: 91.3 kg (201 lb 4.5 oz)   Height: 4' 7.91" (1.42 m)     Physical Exam   Constitutional: She is oriented to person, place, and time. She appears well-developed and well-nourished. She is obese.   HENT:   Head: Normocephalic.   Mouth/Throat: Oropharynx is clear and moist. No oropharyngeal exudate. Mallampati Score: II.   Neck 17"   Neck: Normal range of motion. Neck supple. No tracheal deviation present. No thyromegaly present.   Cardiovascular: Normal rate, regular rhythm and normal heart sounds.   No murmur heard.  Pulmonary/Chest: Normal expansion, symmetric chest wall expansion, effort normal and breath sounds normal. No respiratory distress. She has no decreased breath sounds. She has no wheezes. Negative for tactile fremitus.   Abdominal: Soft. Bowel sounds are normal.   Musculoskeletal: Normal range of motion. She exhibits no edema.   Lymphadenopathy:     She has no cervical adenopathy.     She has no axillary adenopathy.   Neurological: She is alert and oriented to person, place, and time. She has normal reflexes. Gait normal.   Skin: Skin is warm and dry.   Psychiatric: She has a normal mood and affect.   Nursing note and vitals reviewed.    Personal Diagnostic Review    Pulmonary function tests:   FEV1: " 1.44  (78.6 % predicted), FVC:  1.80 (80.8 % predicted), FEV1/FVC:  80.40, TLC: 3.33 (92.6 % predicted),   RV/TLVC: 44.96 (128.7 % predicted), DLCO: 4.35 (66.2 % predicted)      .  No flowsheet data found.      Assessment:       Problem List Items Addressed This Visit     Essential hypertension (Chronic)     Stable followed up by PCP         Mild persistent asthma without complication - Primary (Chronic)    Relevant Medications    mometasone (ASMANEX TWISTHALER) 220 mcg (120 doses) AePB    Other Relevant Orders    Ambulatory Referral to Pulmonary Disease Management    Diffusion capacity of lung (dl), decreased (Chronic)    Hypertriglyceridemia (Chronic)     Stable followed up by PCP         Morbid obesity with body mass index (BMI) of 45.0 to 49.9 in adult (Chronic)     Weight loss and exercise         Nocturnal hypoxemia due to obesity     Oxygen 2.0 LPM         Obesity hypoventilation syndrome     Adherence to nighttime oxygen             Plan:       Weight loss and exercsie  0n 3 BP meds  Adherence with Oxygen   Continue Inhaler regime: ADVAIR changed to ASMANEX, CHRIS and Has nebuliser    Follow up in about 6 months (around 1/8/2020), or deescalated to ICS , for Spirometry and CXR next visit, Weight loss and exercise.    This note was prepared using voice recognition system and is likely to have sound alike errors that may have been overlooked even after proof reading.  Please call me with any questions    Discussed diagnosis, its evaluation, treatment and usual course. All questions answered.    Thank you for the courtesy of participating in the care of this patient    Sylvain Rainey MD    Asthma phenotypes  TH2:  []  Allergy  []  Exercise induced bronchoconstriction  []  Late onset eosinophilic  []  AERD: Aspirin Exacerbated respiratory disease    Non TH2    [x]  Obesity associated  []  Smoking associated  []  Smooth muscle assocaited  [x]  Very late onset Women         Answers for HPI/ROS submitted by the  patient on 7/8/2019   Asthma  In the past 4 weeks, how much of the time did your asthma keep you from getting as much done at work, school, or at home?: none of the time  During the past 4 weeks, how often have you had shortness of breath?: not at all  During the past 4 weeks, how often did your asthma symptoms (Wheezing, coughing, shortness of breath, chest tightness or pain) wake you up at night or earlier that usual in the morning?: not at all  During the past 4 weeks, how often have you used your rescue inhaler or nebulizer medication (such as albuterol)?: not at all  How would you rate your asthma control during the past 4 weeks?: completely controlled   : 25

## 2019-07-09 ENCOUNTER — TELEPHONE (OUTPATIENT)
Dept: PULMONOLOGY | Facility: CLINIC | Age: 49
End: 2019-07-09

## 2019-07-10 NOTE — PROGRESS NOTES
Last 5 Patient Entered Readings                                      Current 30 Day Average: 138/87     Recent Readings 7/8/2019 6/30/2019 6/23/2019 6/18/2019 6/15/2019    SBP (mmHg) 134 159 131 133 132    DBP (mmHg) 82 94 88 86 83    Pulse 82 84 97 97 99        Digital Medicine: Health  Follow Up    Lifestyle Modifications:  Dietary Modifications (Sodium intake <2,000mg/day, food labels, dining out):   Patient is working with an [facility] nutritionist. She is working on increasing her lean meats to help lower her sodium intake.     Physical Activity:   Patient works 2 jobs, both of which are sedentary. Plus she is working nights and so it has her schedule thrown off and she has not had time     Medication Therapy:   Patient has been compliant with the medication regimen.    Patient has the following medication side effects/concerns: none  (Frequency/Alleviating factors/Precipitating factors, etc.)     Follow up with Ms. Yolanda Martin Alpesh completed. No further questions or concerns. Will continue to follow up to achieve health goals.    Notes:  Patient BP is trending upward but that seems to be due to one high reading at the end of last month. Will f/u on diet and look for ways to increase physical activity for next outreach.

## 2019-07-21 DIAGNOSIS — E55.9 VITAMIN D INSUFFICIENCY: ICD-10-CM

## 2019-07-22 RX ORDER — ERGOCALCIFEROL 1.25 MG/1
50000 CAPSULE ORAL
Qty: 4 CAPSULE | Refills: 5 | Status: SHIPPED | OUTPATIENT
Start: 2019-07-22 | End: 2020-02-07 | Stop reason: SDUPTHER

## 2019-07-22 NOTE — TELEPHONE ENCOUNTER
It was planned that that she would transition to vitamin D3 2000 units daily after she completed 12 weeks of the weekly vitamin D2 39769 units once weekly dosing.  However, it appears that vitamin D2 is her insurance formulary preference.    ACTION NEEDED: Please notify her of the above and the change to every other week (Q14D) dosing schedule. Thanks.     Orders Placed This Encounter    ergocalciferol (ERGOCALCIFEROL) 50,000 unit Cap        Requested Prescriptions     Signed Prescriptions Disp Refills    ergocalciferol (ERGOCALCIFEROL) 50,000 unit Cap 4 capsule 5     Sig: Take 1 capsule (50,000 Units total) by mouth every 14 (fourteen) days.     Authorizing Provider: DOMINIK SHEN      Medications Discontinued During This Encounter   Medication Reason    cholecalciferol, vitamin D3, (VITAMIN D3) 2,000 unit Tab Alternate therapy    ergocalciferol (ERGOCALCIFEROL) 50,000 unit Cap Reorder

## 2019-08-07 ENCOUNTER — PATIENT OUTREACH (OUTPATIENT)
Dept: OTHER | Facility: OTHER | Age: 49
End: 2019-08-07

## 2019-08-07 NOTE — PROGRESS NOTES
Last 5 Patient Entered Readings                                      Current 30 Day Average: 143/93     Recent Readings 7/31/2019 7/8/2019 6/30/2019 6/23/2019 6/18/2019    SBP (mmHg) 152 134 159 131 133    DBP (mmHg) 104 82 94 88 86    Pulse 92 82 84 97 97        Digital Medicine: Health  Follow Up    Lifestyle Modifications:  Dietary Modifications (Sodium intake <2,000mg/day, food labels, dining out):  Patient did state that she is sticking to the 59506 kcal diet that she had established with her nutritionist.  Patient has only submitted 2 readings during the month of July with the last one being elevated.     Physical Activity:   Patient still working 2 sedentary jobs with one being at night so it doesn't leave much time for being physically active. Patient did state that she moved last month hence the lack of readings but she stated she got plenty of steps and was very active over the course of about two and a half weeks.     Medication Therapy:   Patient has been compliant with the medication regimen.    Patient has the following medication side effects/concerns: none  (Frequency/Alleviating factors/Precipitating factors, etc.)     Follow up with Ms. Yolanda Mario Betts completed. No further questions or concerns. Will continue to follow up to achieve health goals.    Notes:  Reminded patient that we need at least one reading per week. She stated she can't recall why her BP might have been elevated on 7/31/19. She said it had been about two weeks since she had charged her cuff. Will f/u in 4 weeks for more readings to see how diet and physical activity levels are affecting her BP.

## 2019-08-11 DIAGNOSIS — Z79.4 TYPE 2 DIABETES MELLITUS WITH OTHER SPECIFIED COMPLICATION, WITH LONG-TERM CURRENT USE OF INSULIN: ICD-10-CM

## 2019-08-11 DIAGNOSIS — I10 ESSENTIAL HYPERTENSION: Chronic | ICD-10-CM

## 2019-08-11 DIAGNOSIS — E11.69 TYPE 2 DIABETES MELLITUS WITH OTHER SPECIFIED COMPLICATION, WITH LONG-TERM CURRENT USE OF INSULIN: ICD-10-CM

## 2019-08-12 ENCOUNTER — OFFICE VISIT (OUTPATIENT)
Dept: INTERNAL MEDICINE | Facility: CLINIC | Age: 49
End: 2019-08-12
Payer: MEDICAID

## 2019-08-12 ENCOUNTER — HOSPITAL ENCOUNTER (OUTPATIENT)
Dept: RADIOLOGY | Facility: HOSPITAL | Age: 49
Discharge: HOME | End: 2019-08-12
Attending: FAMILY MEDICINE
Payer: MEDICAID

## 2019-08-12 VITALS
BODY MASS INDEX: 44.54 KG/M2 | SYSTOLIC BLOOD PRESSURE: 114 MMHG | WEIGHT: 198 LBS | TEMPERATURE: 98 F | HEIGHT: 56 IN | OXYGEN SATURATION: 94 % | HEART RATE: 94 BPM | DIASTOLIC BLOOD PRESSURE: 76 MMHG

## 2019-08-12 DIAGNOSIS — Z79.4 TYPE 2 DIABETES MELLITUS WITH OTHER SPECIFIED COMPLICATION, WITH LONG-TERM CURRENT USE OF INSULIN: ICD-10-CM

## 2019-08-12 DIAGNOSIS — M79.645 PAIN OF FINGER OF LEFT HAND: ICD-10-CM

## 2019-08-12 DIAGNOSIS — E11.65 TYPE 2 DIABETES MELLITUS WITH HYPERGLYCEMIA, WITHOUT LONG-TERM CURRENT USE OF INSULIN: Primary | Chronic | ICD-10-CM

## 2019-08-12 DIAGNOSIS — I10 ESSENTIAL HYPERTENSION: Chronic | ICD-10-CM

## 2019-08-12 DIAGNOSIS — E78.5 DYSLIPIDEMIA ASSOCIATED WITH TYPE 2 DIABETES MELLITUS: Chronic | ICD-10-CM

## 2019-08-12 DIAGNOSIS — E11.69 TYPE 2 DIABETES MELLITUS WITH OTHER SPECIFIED COMPLICATION, WITH LONG-TERM CURRENT USE OF INSULIN: ICD-10-CM

## 2019-08-12 DIAGNOSIS — M79.674 PAIN OF RIGHT GREAT TOE: ICD-10-CM

## 2019-08-12 DIAGNOSIS — E11.69 DYSLIPIDEMIA ASSOCIATED WITH TYPE 2 DIABETES MELLITUS: Chronic | ICD-10-CM

## 2019-08-12 DIAGNOSIS — I15.2 HYPERTENSION COMPLICATING DIABETES: Chronic | ICD-10-CM

## 2019-08-12 DIAGNOSIS — E66.2 OBESITY HYPOVENTILATION SYNDROME: ICD-10-CM

## 2019-08-12 DIAGNOSIS — E66.01 MORBID OBESITY WITH BMI OF 40.0-44.9, ADULT: ICD-10-CM

## 2019-08-12 DIAGNOSIS — E11.59 HYPERTENSION COMPLICATING DIABETES: Chronic | ICD-10-CM

## 2019-08-12 PROCEDURE — 99214 OFFICE O/P EST MOD 30 MIN: CPT | Mod: PBBFAC,25 | Performed by: FAMILY MEDICINE

## 2019-08-12 PROCEDURE — 99999 PR PBB SHADOW E&M-EST. PATIENT-LVL IV: ICD-10-PCS | Mod: PBBFAC,,, | Performed by: FAMILY MEDICINE

## 2019-08-12 PROCEDURE — 73140 XR FINGER 2 OR MORE VIEWS LEFT: ICD-10-PCS | Mod: 26,LT,, | Performed by: RADIOLOGY

## 2019-08-12 PROCEDURE — 73140 X-RAY EXAM OF FINGER(S): CPT | Mod: TC,LT

## 2019-08-12 PROCEDURE — 99214 OFFICE O/P EST MOD 30 MIN: CPT | Mod: S$PBB,,, | Performed by: FAMILY MEDICINE

## 2019-08-12 PROCEDURE — 73140 X-RAY EXAM OF FINGER(S): CPT | Mod: 26,LT,, | Performed by: RADIOLOGY

## 2019-08-12 PROCEDURE — 99999 PR PBB SHADOW E&M-EST. PATIENT-LVL IV: CPT | Mod: PBBFAC,,, | Performed by: FAMILY MEDICINE

## 2019-08-12 PROCEDURE — 99214 PR OFFICE/OUTPT VISIT, EST, LEVL IV, 30-39 MIN: ICD-10-PCS | Mod: S$PBB,,, | Performed by: FAMILY MEDICINE

## 2019-08-12 RX ORDER — LURASIDONE HYDROCHLORIDE 120 MG/1
TABLET, FILM COATED ORAL
COMMUNITY
Start: 2019-08-11 | End: 2019-12-09

## 2019-08-12 RX ORDER — ALPRAZOLAM 2 MG/1
2 TABLET ORAL 2 TIMES DAILY
COMMUNITY
Start: 2019-08-11 | End: 2019-12-09

## 2019-08-12 NOTE — PROGRESS NOTES
CHIEF COMPLAINT  Follow-up      HISTORY, ASSESSMENT, and PLAN    -Type 2 diabetes mellitus with hyperglycemia, without long-term current use of insulin   -Type 2 diabetes mellitus with other specified complication, with long-term current use of insulin        ASSESSMENT:  Improved, but still sub-optimally controlled.    -Hypertension complicating diabetes   -Essential hypertension        ASSESSMENT:  Well controlled.    -Obesity hypoventilation syndrome   -Morbid obesity with BMI of 40.0-44.9, adult        ASSESSMENT:  Modest improvement with therapeutic lifestyle changes.  We discussed treatment options.    -Dyslipidemia associated with type 2 diabetes mellitus        ASSESSMENT: She appears to be tolerating statin for dyslipidemia without apparent symptoms of myositis or hepatic dysfunction.  LDL at goal.    -Pain of DIP joint of index finger of left hand   -Pain of right great toe       ASSESSMENT:  She reports recurrent pain of DIP joint of left index finger and MTP joint of right great toe.  She reports no recent relevant injury.  Description sounds more osteoarthritic than gout.  We discussed differential diagnosis.    Diabetes Management Status    Statin: Taking  ACE/ARB: Taking    Screening or Prevention Patient's value Goal Complete/Controlled?   HgA1C Testing and Control   Lab Results   Component Value Date    HGBA1C 7.7 (H) 06/24/2019      Annually/Less than 8% Yes   Lipid profile : 04/25/2019 Annually Yes   LDL control Lab Results   Component Value Date    LDLCALC 40.4 (L) 04/25/2019    Annually/Less than 100 mg/dl  Yes   Nephropathy screening Lab Results   Component Value Date    LABMICR 7.0 04/25/2019     Lab Results   Component Value Date    PROTEINUA Negative 08/26/2017    Annually Yes   Blood pressure BP Readings from Last 1 Encounters:   08/12/19 114/76    Less than 140/90 Yes   Dilated retinal exam : 05/22/2019 Annually Yes   Foot exam   : 05/16/2019 Annually Yes     Lab Results   Component Value  Date    HGBA1C 7.7 (H) 06/24/2019    HGBA1C 8.3 (H) 12/22/2017    HGBA1C 8.5 (H) 01/31/2017    ESTGFRAFRICA >60 12/22/2017    EGFRNONAA >60 12/22/2017    MICALBCREAT 10.4 04/25/2019    LDLCALC 40.4 (L) 04/25/2019     Wt Readings from Last 3 Encounters:   08/12/19 89.8 kg (197 lb 15.6 oz)   07/08/19 91.3 kg (201 lb 4.5 oz)   07/08/19 91.3 kg (201 lb 4.5 oz)     Body mass index is 44.38 kg/m².      Orders Placed This Encounter    X-Ray Finger 2 or More Views Left    Uric acid    Ambulatory Ref[facility] ([facility])    blood-glucose meter Misc    valsartan-hydrochlorothiazide (DIOVAN-HCT) 320-25 mg per tablet       Problem List Items Addressed This Visit        Cardiac/Vascular    Dyslipidemia associated with type 2 diabetes mellitus (Chronic)    Relevant Orders    Ambulatory Referral to Bariatric Surgery ([facility])    Essential hypertension (Chronic)    Relevant Medications    valsartan-hydrochlorothiazide (DIOVAN-HCT) 320-25 mg per tablet    Hypertension complicating diabetes (Chronic)    Relevant Orders    Ambulatory Referral to Bariatric Surgery ([facility])       Endocrine    Morbid obesity with BMI of 40.0-44.9, adult    Current Assessment & Plan     Wt Readings from Last 5 Encounters:   08/12/19 89.8 kg (197 lb 15.6 oz)   07/08/19 91.3 kg (201 lb 4.5 oz)   07/08/19 91.3 kg (201 lb 4.5 oz)   06/13/19 88.4 kg (194 lb 14.2 oz)   06/03/19 89.4 kg (197 lb 1.5 oz)   Body mass index is 44.38 kg/m².   Improved with therapeutic lifestyle changes.         Relevant Orders    Ambulatory Referral to Bariatric Surgery ([facility])    Type 2 diabetes mellitus with hyperglycemia, without long-term current use of insulin - Primary (Chronic)    Relevant Medications    blood-glucose meter Misc    Other Relevant Orders    Ambulatory Referral to Bariatric Surgery ([facility])       Other    Obesity hypoventilation syndrome    Overview     Requires oxygen with sleep. Weight loss and exercise to improve  "overall health.           Relevant Orders    Ambulatory Referral to Bariatric Surgery ([facility])      Other Visit Diagnoses     Type 2 diabetes mellitus with other specified complication, with long-term current use of insulin        Relevant Medications    blood-glucose meter Misc    Pain of DIP joint of index finger of left hand        Relevant Orders    X-Ray Finger 2 or More Views Left (Completed)    Pain of right great toe        Relevant Orders    Uric acid (Completed)           Outpatient Medications Prior to Visit   Medication Sig Dispense Refill    albuterol (PROVENTIL/VENTOLIN HFA) 90 mcg/actuation inhaler Inhale 2 puffs into the lungs every 4 (four) hours as needed. 8.5 g 5    ALPRAZolam (XANAX) 2 MG Tab Take 2 mg by mouth 2 (two) times daily.       aspirin (ECOTRIN) 81 MG EC tablet Take 81 mg by mouth once daily.      atorvastatin (LIPITOR) 20 MG tablet Take 1 tablet (20 mg total) by mouth every evening. 90 tablet 3    BD INSULIN SYRINGE ULTRA-FINE 1/2 mL 30 gauge x 1/2" Syrg   0    benztropine (COGENTIN) 1 MG tablet Take 1 mg by mouth nightly.  1    blood sugar diagnostic Strp Check blood glucose 3 times daily as directed and as needed (dispense insurance preferred brand or patient choice) 100 each 11    cycloSPORINE (RESTASIS) 0.05 % ophthalmic emulsion Place 0.4 mLs (1 drop total) into both eyes 2 (two) times daily. 60 each 11    duloxetine (CYMBALTA) 60 MG capsule Take 60 mg by mouth 2 (two) times daily.  2    empagliflozin (JARDIANCE) 10 mg Tab Take 1 tablet (10 mg) by mouth every morning. 30 tablet 5    ergocalciferol (ERGOCALCIFEROL) 50,000 unit Cap Take 1 capsule (50,000 Units total) by mouth every 14 (fourteen) days. 4 capsule 5    fish oil-omega-3 fatty acids 300-1,000 mg capsule Take 1 capsule by mouth once daily.      gabapentin (NEURONTIN) 100 MG capsule Take 1 capsule (100 mg total) by mouth 3 (three) times daily. 90 capsule 3    insulin detemir U-100 (LEVEMIR FLEXTOUCH) 100 " "unit/mL (3 mL) SubQ InPn pen Inject 70 Units into the skin every evening. 30 mL 3    lancets 28 gauge Misc use as directed 3 times daily 100 each 11    LATUDA 120 mg Tab       LINZESS 145 mcg Cap capsule Take 145 mcg by mouth as needed.   3    liraglutide 0.6 mg/0.1 mL, 18 mg/3 mL, subq PNIJ (VICTOZA 2-MILENA) 0.6 mg/0.1 mL (18 mg/3 mL) PnIj Use 1.8mg under the skin daily 9 mL 3    metFORMIN (GLUCOPHAGE) 1000 MG tablet TAKE 1 TABLET (1,000 MG TOTAL) BY MOUTH 2 (TWO) TIMES DAILY. 180 tablet 11    metoprolol succinate (TOPROL-XL) 100 MG 24 hr tablet Take 1 tablet (100 mg total) by mouth once daily. 30 tablet 5    mirtazapine (REMERON) 45 MG tablet Take 45 mg by mouth every evening.      mometasone (ASMANEX TWISTHALER) 220 mcg (120 doses) AePB Inhale 2 puffs into the lungs 2 (two) times daily. Controller 1 each 11    montelukast (SINGULAIR) 10 mg tablet Take 1 tablet (10 mg total) by mouth nightly. 30 tablet 11    MULTIVIT,CALC,MINS/IRON/FOLIC (DAILY MULTIPLE FOR WOMEN ORAL) Take 1 tablet by mouth once daily.      pen needle, diabetic (BD ULTRA-FINE MINI PEN NEEDLE) 31 gauge x 3/16" Ndle Use twice a day 100 each 3    promethazine (PHENERGAN) 25 MG tablet Take 1 tablet (25 mg total) by mouth every 4 to 6 hours as needed for Nausea. sedating 20 tablet 0    zolpidem (AMBIEN) 10 mg Tab Take 10 mg by mouth nightly as needed.      blood-glucose meter Misc use 3 times daily as directed 1 each 0    lurasidone (LATUDA) 60 mg Tab tablet Take 60 mg by mouth once daily.      valsartan-hydrochlorothiazide (DIOVAN-HCT) 320-25 mg per tablet Take 1 tablet by mouth once daily. 30 tablet 2    fluticasone propionate (FLONASE) 50 mcg/actuation nasal spray 2 sprays (100 mcg total) by Each Nare route once daily. 16 g 11    nitroGLYCERIN (NITROSTAT) 0.4 MG SL tablet Dissolve 1 tablet under the tongue every 5 minutes as needed, up to 3 times. If chest pain persists, call 911.      alprazolam (XANAX) 1 MG tablet Take 1 mg by " "mouth 3 (three) times daily as needed.      naproxen (NAPROSYN) 500 MG tablet Take 1 tablet (500 mg total) by mouth 2 (two) times daily as needed (FOR FIBROMYALGIA PAIN). 60 tablet 3    verapamil (CALAN) 120 MG tablet Take 1 tablet (120 mg total) by mouth 2 (two) times daily. 60 tablet 11     No facility-administered medications prior to visit.         Medications Ordered This Encounter   Medications    blood-glucose meter Misc     Sig: use 3 times daily as directed     Dispense:  1 each     Refill:  0    valsartan-hydrochlorothiazide (DIOVAN-HCT) 320-25 mg per tablet     Sig: Take 1 tablet by mouth once daily.     Dispense:  90 tablet     Refill:  3       Medications Discontinued During This Encounter   Medication Reason    alprazolam (XANAX) 1 MG tablet Dose adjustment    lurasidone (LATUDA) 60 mg Tab tablet Change in Dosage Form    naproxen (NAPROSYN) 500 MG tablet Patient no longer taking    verapamil (CALAN) 120 MG tablet Patient no longer taking    blood-glucose meter Misc Reorder    valsartan-hydrochlorothiazide (DIOVAN-HCT) 320-25 mg per tablet Reorder        Follow up in about 5 months (around 1/12/2020) for re-evaluate problem(s) discussed today.    REVIEW OF SYSTEMS  CARDIOVASCULAR: No angina or orthopnea reported.   ENDOCRINE: No polyuria or polydipsia reported.     PHYSICAL EXAM  Vitals:    08/12/19 1126   BP: 114/76   BP Location: Right arm   Patient Position: Sitting   Pulse: 94   Temp: 97.6 °F (36.4 °C)   TempSrc: Tympanic   SpO2: (!) 94%   Weight: 89.8 kg (197 lb 15.6 oz)   Height: 4' 8" (1.422 m)     CONSTITUTIONAL: Vital signs noted. No apparent distress. Does not appear acutely ill or septic. Appears adequately hydrated.  HEENT: External ENT grossly unremarkable. Hearing grossly intact. Oropharynx moist.  PULM: Lungs clear. Breathing unlabored.  HEART: Auscultation reveals regular rate and rhythm without murmur, gallop or rub.  DERM: Skin warm and moist with normal turgor.  NEURO: There " "are no gross focal motor deficits or gross deficits of cranial nerves III-XII.  PSYCHIATRIC: Alert and conversant. Mood grossly neutral. Judgment and insight not grossly compromised.   MUSCULOSKELETAL: Grossly normal stance and gait. DIP joint of left index finger has mild nontender swelling.  MTP joint of right great toe unremarkable.    Documentation entered by me for this encounter may have been done in part using speech-recognition technology. Although I have made an effort to ensure accuracy, "sound like" errors may exist and should be interpreted in context. -DOMINIK Beach MD.   "

## 2019-08-12 NOTE — ASSESSMENT & PLAN NOTE
Wt Readings from Last 5 Encounters:   08/12/19 89.8 kg (197 lb 15.6 oz)   07/08/19 91.3 kg (201 lb 4.5 oz)   07/08/19 91.3 kg (201 lb 4.5 oz)   06/13/19 88.4 kg (194 lb 14.2 oz)   06/03/19 89.4 kg (197 lb 1.5 oz)   Body mass index is 44.38 kg/m².   Improved with therapeutic lifestyle changes.

## 2019-08-13 RX ORDER — VALSARTAN AND HYDROCHLOROTHIAZIDE 320; 25 MG/1; MG/1
1 TABLET, FILM COATED ORAL DAILY
Qty: 30 TABLET | Refills: 2 | OUTPATIENT
Start: 2019-08-13 | End: 2020-08-12

## 2019-08-13 RX ORDER — DEXTROSE 4 G
TABLET,CHEWABLE ORAL
Qty: 1 EACH | Refills: 0 | OUTPATIENT
Start: 2019-08-13

## 2019-08-13 RX ORDER — VALSARTAN AND HYDROCHLOROTHIAZIDE 320; 25 MG/1; MG/1
1 TABLET, FILM COATED ORAL DAILY
Qty: 90 TABLET | Refills: 3 | Status: SHIPPED | OUTPATIENT
Start: 2019-08-13 | End: 2019-11-04

## 2019-08-13 RX ORDER — DEXTROSE 4 G
TABLET,CHEWABLE ORAL
Qty: 1 EACH | Refills: 0 | Status: SHIPPED | OUTPATIENT
Start: 2019-08-13 | End: 2019-11-13 | Stop reason: SDUPTHER

## 2019-08-13 NOTE — TELEPHONE ENCOUNTER
Requested Prescriptions     Refused Prescriptions Disp Refills    blood-glucose meter Misc 1 each 0     Sig: use 2 times daily as directed     Refused By: CHARLA SHEN     Reason for Refusal: Request already responded to by other means (e.g. phone or fax)    valsartan-hydrochlorothiazide (DIOVAN-HCT) 320-25 mg per tablet 30 tablet 2     Sig: Take 1 tablet by mouth once daily.     Refused By: CHARLA SHEN     Reason for Refusal: Request already responded to by other means (e.g. phone or fax)

## 2019-08-15 ENCOUNTER — TELEPHONE (OUTPATIENT)
Dept: ENDOCRINOLOGY | Facility: CLINIC | Age: 49
End: 2019-08-15

## 2019-08-15 NOTE — TELEPHONE ENCOUNTER
----- Message from Arley Miles sent at 8/15/2019  9:08 AM CDT -----  Contact: pt   Please give pt a call at .[phone] (home) she is calling to have her appt rescheduled and due to medicaid system isn't providing any appt

## 2019-08-16 ENCOUNTER — PATIENT OUTREACH (OUTPATIENT)
Dept: OTHER | Facility: OTHER | Age: 49
End: 2019-08-16

## 2019-08-16 NOTE — PROGRESS NOTES
Last 5 Patient Entered Readings                                      Current 30 Day Average: 145/101     Recent Readings 8/14/2019 8/14/2019 8/12/2019 7/31/2019 7/8/2019    SBP (mmHg) 156 156 127 152 134    DBP (mmHg) 104 107 89 104 82    Pulse 87 89 91 92 82        Called patient to follow up. Requested patient submit more frequent readings for assessment. Patient notes she doesn't believe her BP was as elevated as shown on 8/14/19, so she does not wish to make changes today. Advised her to charge her cuff, and be mindful of her technique when taking her blood pressure.   Will give patient 2-3 weeks to submit more consistent readings. Will assess and amend therapy based on them.   At this time, BP is not at goal.     Current medication regimen:  Hypertension Medications             metoprolol succinate (TOPROL-XL) 100 MG 24 hr tablet Take 1 tablet (100 mg total) by mouth once daily.    nitroGLYCERIN (NITROSTAT) 0.4 MG SL tablet Dissolve 1 tablet under the tongue every 5 minutes as needed, up to 3 times. If chest pain persists, call 911.    valsartan-hydrochlorothiazide (DIOVAN-HCT) 320-25 mg per tablet Take 1 tablet by mouth once daily.         Patient denies having questions or concerns. Patient has my contact information and knows to call with any concerns or clinical changes.

## 2019-08-20 ENCOUNTER — TELEPHONE (OUTPATIENT)
Dept: INTERNAL MEDICINE | Facility: CLINIC | Age: 49
End: 2019-08-20

## 2019-08-20 DIAGNOSIS — M79.645 PAIN OF FINGER OF LEFT HAND: Primary | ICD-10-CM

## 2019-08-20 NOTE — TELEPHONE ENCOUNTER
----- Message from Rashmi Albrecht sent at 8/20/2019  1:09 PM CDT -----  Contact: pt  Please call pt @ [phone] regarding referral for bartric   surgery, states doctor do not accept her insurance, pt need another doctor.

## 2019-08-20 NOTE — TELEPHONE ENCOUNTER
Yolanda Tate.    I am sorry to hear that your finger is still hurting.    Your x-ray is relatively unremarkable.  It showed some mild degenerative changes (wear and tear type changes), but it did not show any fracture or dislocation.  Your uric acid lab test would put you at relatively low risk for gout.    Since you are hurting so much, I'm ordering a consultation with one of our bone-and-joint specialists. Someone from [facility] will be contacting you soon to help you schedule this.    Thanks for letting me care for you, thanks for trusting us with your healthcare needs, and thanks for using [facility].    Sincerely,    DOMINIK Beach MD    Orders Placed This Encounter    Ambulatory Referral to Orthopedics

## 2019-08-20 NOTE — TELEPHONE ENCOUNTER
Spoke with patient and she stated that the bariatric surgeon Dr. Beach sent her to doesn't take her insurance. I informed her to check with her insurance and then let us know which bariatric provider was covered and then we could do a direct referral to them for her. She also wanted to know about her finger x-ray results and uric acid lab result and what to do about the pain she is still having from her finger.

## 2019-08-22 ENCOUNTER — TELEPHONE (OUTPATIENT)
Dept: INTERNAL MEDICINE | Facility: CLINIC | Age: 49
End: 2019-08-22

## 2019-08-22 DIAGNOSIS — B37.31 CANDIDA VAGINITIS: Primary | ICD-10-CM

## 2019-08-22 RX ORDER — TERCONAZOLE 8 MG/G
1 CREAM VAGINAL NIGHTLY
Qty: 60 G | Refills: 0 | Status: SHIPPED | OUTPATIENT
Start: 2019-08-22 | End: 2019-08-25

## 2019-08-22 NOTE — TELEPHONE ENCOUNTER
Spoke with patient and she stated that she has a bad yeast infection and would like Dr. Beach to send her some Diflucan and an external cream.

## 2019-08-22 NOTE — TELEPHONE ENCOUNTER
----- Message from Bobo Nye sent at 8/22/2019  9:28 AM CDT -----  Contact: PT   Type:  Needs Medical Advice    Who Called: PT   Symptoms (please be specific): Severe yeast infection  How long has patient had these symptoms:  Last few days   Pharmacy name and phone #: [facility] on [facility]  Would the patient rather a call back or a response via [facility]? Call back   Best Call Back Number: [phone]  Additional Information: n/a

## 2019-08-22 NOTE — TELEPHONE ENCOUNTER
Yolanda Tate.    I received your message. I'm sorry to hear that you're having problems with a yeast infection.    I sent you in a prescription to your preferred pharmacy ([facility]).  It is a three day course of treatment.  Your symptoms likely will not be gone in three days, but this treatment should be enough to eliminate the yeast infection and allow you to get better over the next several days.    I hope you feel better soon.    Thanks for letting me care for you, thanks for trusting us with your healthcare needs, and thanks for using [facility].    Sincerely,    DOMINIK Beach MD  Medications Ordered This Encounter   Medications    terconazole (TERAZOL 3) 0.8 % vaginal cream     Sig: Place 1 applicator vaginally every evening. for 3 days     Dispense:  60 g     Refill:  0     CLARIFICATION: Dispense QS for 3 doses

## 2019-09-03 ENCOUNTER — TELEPHONE (OUTPATIENT)
Dept: INTERNAL MEDICINE | Facility: CLINIC | Age: 49
End: 2019-09-03

## 2019-09-03 DIAGNOSIS — N93.9 VAGINAL BLEEDING, ABNORMAL: Primary | ICD-10-CM

## 2019-09-03 NOTE — TELEPHONE ENCOUNTER
Spoke with patient and she stated that she is on both Jardiance and Valsartan and she read that it can cause ulcerations in the vaginal area . She stated that she is now bleeding from her vagina and needs an appointment as soon as possible. She has had a prior hysterectomy. Patient wants to know if she can be seen this Thursday.

## 2019-09-03 NOTE — TELEPHONE ENCOUNTER
I am unaware of either of these medications causing vaginal bleeding.    ACTION NEEDED: Please schedule her for first available gynecology appointment.  If she cannot be scheduled for a timely gynecology appointment, then I authorized schedule override for an appointment with me at her earliest convenience.    Orders Placed This Encounter    Ambulatory Referral to Gynecology

## 2019-09-03 NOTE — TELEPHONE ENCOUNTER
----- Message from Bobo Nye sent at 9/3/2019  8:29 AM CDT -----  Contact: PT   Type:  Needs Medical Advice    Who Called: Pt   Symptoms (please be specific): allergic reaction from her medication bleeding in her vaginal area  How long has patient had these symptoms: last few days  Pharmacy name and phone #: n/a  Would the patient rather a call back or a response via [facility]? Call back   Best Call Back Number:  [phone] ([place])   Additional Information: n/a

## 2019-09-04 ENCOUNTER — OFFICE VISIT (OUTPATIENT)
Dept: ENDOCRINOLOGY | Facility: CLINIC | Age: 49
End: 2019-09-04
Payer: MEDICAID

## 2019-09-04 ENCOUNTER — OFFICE VISIT (OUTPATIENT)
Dept: OBSTETRICS AND GYNECOLOGY | Facility: CLINIC | Age: 49
End: 2019-09-04
Payer: MEDICAID

## 2019-09-04 ENCOUNTER — LAB VISIT (OUTPATIENT)
Dept: LAB | Facility: HOSPITAL | Age: 49
End: 2019-09-04
Attending: OBSTETRICS & GYNECOLOGY
Payer: MEDICAID

## 2019-09-04 VITALS
BODY MASS INDEX: 45.68 KG/M2 | DIASTOLIC BLOOD PRESSURE: 80 MMHG | WEIGHT: 203.06 LBS | SYSTOLIC BLOOD PRESSURE: 120 MMHG | HEIGHT: 56 IN

## 2019-09-04 VITALS
DIASTOLIC BLOOD PRESSURE: 99 MMHG | BODY MASS INDEX: 45.17 KG/M2 | TEMPERATURE: 99 F | WEIGHT: 200.81 LBS | HEART RATE: 86 BPM | HEIGHT: 56 IN | SYSTOLIC BLOOD PRESSURE: 152 MMHG

## 2019-09-04 DIAGNOSIS — Z11.3 SCREEN FOR STD (SEXUALLY TRANSMITTED DISEASE): ICD-10-CM

## 2019-09-04 DIAGNOSIS — N76.6 GENITAL ULCER, FEMALE: ICD-10-CM

## 2019-09-04 DIAGNOSIS — F25.0 SCHIZOAFFECTIVE DISORDER, BIPOLAR TYPE: Chronic | ICD-10-CM

## 2019-09-04 DIAGNOSIS — E11.42 DIABETIC POLYNEUROPATHY ASSOCIATED WITH TYPE 2 DIABETES MELLITUS: Chronic | ICD-10-CM

## 2019-09-04 DIAGNOSIS — F31.9 BIPOLAR 1 DISORDER: Chronic | ICD-10-CM

## 2019-09-04 DIAGNOSIS — N76.0 VULVOVAGINITIS: ICD-10-CM

## 2019-09-04 DIAGNOSIS — N76.6 GENITAL ULCER, FEMALE: Primary | ICD-10-CM

## 2019-09-04 PROCEDURE — 99214 PR OFFICE/OUTPT VISIT, EST, LEVL IV, 30-39 MIN: ICD-10-PCS | Mod: S$PBB,,, | Performed by: INTERNAL MEDICINE

## 2019-09-04 PROCEDURE — 99999 PR PBB SHADOW E&M-EST. PATIENT-LVL III: ICD-10-PCS | Mod: PBBFAC,,, | Performed by: INTERNAL MEDICINE

## 2019-09-04 PROCEDURE — 86694 HERPES SIMPLEX NES ANTBDY: CPT

## 2019-09-04 PROCEDURE — 80074 ACUTE HEPATITIS PANEL: CPT

## 2019-09-04 PROCEDURE — 99999 PR PBB SHADOW E&M-EST. PATIENT-LVL III: CPT | Mod: PBBFAC,,, | Performed by: INTERNAL MEDICINE

## 2019-09-04 PROCEDURE — 86703 HIV-1/HIV-2 1 RESULT ANTBDY: CPT

## 2019-09-04 PROCEDURE — 99204 PR OFFICE/OUTPT VISIT, NEW, LEVL IV, 45-59 MIN: ICD-10-PCS | Mod: S$PBB,,, | Performed by: OBSTETRICS & GYNECOLOGY

## 2019-09-04 PROCEDURE — 99213 OFFICE O/P EST LOW 20 MIN: CPT | Mod: PBBFAC,27 | Performed by: INTERNAL MEDICINE

## 2019-09-04 PROCEDURE — 87529 HSV DNA AMP PROBE: CPT

## 2019-09-04 PROCEDURE — 99214 OFFICE O/P EST MOD 30 MIN: CPT | Mod: S$PBB,,, | Performed by: INTERNAL MEDICINE

## 2019-09-04 PROCEDURE — 99204 OFFICE O/P NEW MOD 45 MIN: CPT | Mod: S$PBB,,, | Performed by: OBSTETRICS & GYNECOLOGY

## 2019-09-04 PROCEDURE — 99999 PR PBB SHADOW E&M-EST. PATIENT-LVL V: ICD-10-PCS | Mod: PBBFAC,,, | Performed by: OBSTETRICS & GYNECOLOGY

## 2019-09-04 PROCEDURE — 99215 OFFICE O/P EST HI 40 MIN: CPT | Mod: PBBFAC,27 | Performed by: OBSTETRICS & GYNECOLOGY

## 2019-09-04 PROCEDURE — 86592 SYPHILIS TEST NON-TREP QUAL: CPT

## 2019-09-04 PROCEDURE — 99999 PR PBB SHADOW E&M-EST. PATIENT-LVL V: CPT | Mod: PBBFAC,,, | Performed by: OBSTETRICS & GYNECOLOGY

## 2019-09-04 PROCEDURE — 86696 HERPES SIMPLEX TYPE 2 TEST: CPT

## 2019-09-04 RX ORDER — LIDOCAINE 50 MG/G
OINTMENT TOPICAL
Qty: 35.44 G | Refills: 0 | Status: SHIPPED | OUTPATIENT
Start: 2019-09-04 | End: 2019-09-19 | Stop reason: SDUPTHER

## 2019-09-04 RX ORDER — FLUCONAZOLE 150 MG/1
150 TABLET ORAL DAILY
Qty: 1 TABLET | Refills: 0 | Status: SHIPPED | OUTPATIENT
Start: 2019-09-04 | End: 2019-09-05

## 2019-09-04 RX ORDER — INSULIN ASPART 100 [IU]/ML
INJECTION, SOLUTION INTRAVENOUS; SUBCUTANEOUS
Qty: 15 ML | Refills: 3 | Status: SHIPPED | OUTPATIENT
Start: 2019-09-04 | End: 2019-12-17 | Stop reason: SDUPTHER

## 2019-09-04 RX ORDER — VALACYCLOVIR HYDROCHLORIDE 1 G/1
1000 TABLET, FILM COATED ORAL 2 TIMES DAILY
Qty: 20 TABLET | Refills: 0 | Status: SHIPPED | OUTPATIENT
Start: 2019-09-04 | End: 2019-09-19 | Stop reason: SDUPTHER

## 2019-09-04 RX ORDER — PEN NEEDLE, DIABETIC 30 GX3/16"
NEEDLE, DISPOSABLE MISCELLANEOUS
Qty: 200 EACH | Refills: 3 | Status: SHIPPED | OUTPATIENT
Start: 2019-09-04 | End: 2019-09-20 | Stop reason: SDUPTHER

## 2019-09-04 NOTE — PATIENT INSTRUCTIONS
Herpes  If you have herpes, youre not alone. Millions of Americans have it. Herpes has no cure. But you can control it and learn how to protect yourself and others from outbreaks.  What is herpes?  Herpes is a chronic (lifelong) virus. It can cause sores and discomfort. You get it from contact with someone who carries the virus. If sores occur on the lips, you have oral herpes. If sores occur on the penis or around the vagina, you have genital herpes.  Herpes outbreaks  · The first outbreak of herpes sores is usually the most severe. Then, the soldiers of the bodys immune system, white blood cells, produce antibodies. These antibodies help neutralize the herpes virus and may help make future attacks less severe.  · Some people have only one outbreak of sores. Some people have periods of frequent outbreaks (every few weeks). Outbreaks of herpes sores usually happen less often over time.  · Herpes sores may appear without a cause. Outbreaks are more likely when the immune system is weak. Other viral infections (such as a cold) can cause outbreaks. Stress from a poor diet, fatigue, or emotional upset can lead to outbreaks of sores. Exposure to strong sunlight often causes herpes sores to reappear.   To help prevent outbreaks  · To prevent oral herpes outbreaks, avoid overexposure to wind, sun, and extreme temperatures. Use sunscreen and lip balm on affected areas.  · If you are having frequent outbreaks, ask your healthcare provider about medicines that can help prevent outbreaks.  How herpes spreads to others  Herpes can be spread during an outbreak. But even without sores present, you can still shed the virus and infect others. You can take steps to prevent this.  To protect yourself and others  · If you have an oral sore, avoid kissing and oral-genital contact.  · If you have a genital sore, avoid intercourse. Also avoid oral-genital contact.  · Wash your hands after touching a sore.  · Use a condom each time  you have sex. You can pass the virus even when sores arent present. If youre unsure about the timing of certain kinds of physical contact, ask your health care provider.  · Tell any new partners that you have herpes.  · If youre a woman, have Pap tests as often as your healthcare provider recommends.  · A woman can spread herpes to their  during the birth process, whether or not they have an active genital sore. If pregnant, don't forget to tell your healthcare provider early in the pregnancy.   · In some cases, daily antiviral medicine (acyclovir, famcyclovir, or valavyclovir), in addition to consistent condom use, may reduce your chances of spreading herpes to an uninfected partner. Ask your healthcare provider if this medicine would be helpful for you.  Resources  [employer] STD Hotline  204.980.1053  www.ashastd.org  Centers for Disease Control and Prevention  417.476.7851  www.cdc.gov/std   Date Last Reviewed: 2016-2017 [employer]. [address] [place] [address], [place] [address]. All rights reserved. This information is not intended as a substitute for professional medical care. Always follow your healthcare professional's instructions.

## 2019-09-04 NOTE — LETTER
September 4, 2019      Malka Severino MD  [address] [place] [address]           [facility]  [address]  [place] [address]  Phone: [phone]  Fax: [phone]          Patient: Yolanda Betts   MR Number: [id number]   YOB: 1970   Date of Visit: 9/4/2019       Dear Dr. Malka Severino:    Thank you for referring Yolanda Betts to me for evaluation. Attached you will find relevant portions of my assessment and plan of care.    If you have questions, please do not hesitate to call me. I look forward to following Yolanda Betts along with you.    Sincerely,    Miriam Khan MD    Enclosure  CC:  No Recipients    If you would like to receive this communication electronically, please contact [email or web address] or [phone] to request more information on [facility] Link access.    For providers and/or their staff who would like to refer a patient to [facility], please contact us through our one-stop-shop provider referral line, [facility], at [phone].    If you feel you have received this communication in error or would no longer like to receive these types of communications, please e-mail [email or web address]

## 2019-09-04 NOTE — PROGRESS NOTES
Patient ID: Yolanda Betts is a 49 y.o. female.  Patient is here for follow up        Chief Complaint: Diabetes      HPI     Yolanda Betts is here for follow-up  of type 2 Diabetes Mellitus    Consultation requested by Dr. Ermias Mccormick    PCP: CHARLA Beach MD      Diagnosed:  Around 2000 and has been on insulin several years.  Had better control earlier on around her diagnosis  Saw iMllie Pruitt 2/2018 saw endocrinologist once in 2016, Dr. Mejia    She had a normal C-peptide April 2018 4.56      Past Medical History:   Diagnosis Date    Anemia     Anxiety     Arthritis     Asthma 10/11/2016    Bipolar 1 disorder     Diabetes mellitus, type 2     Dyslipidemia associated with type 2 diabetes mellitus 5/13/2019    GERD (gastroesophageal reflux disease)     Hypertension     Hypertension complicating diabetes 5/5/2019    Mild persistent asthma without complication 10/11/2016    Morbid obesity with body mass index (BMI) of 45.0 to 49.9 in adult 8/3/2017    Obstructive sleep apnea     Schizoaffective disorder, bipolar type 4/25/2019    Seasonal allergic rhinitis due to pollen 5/13/2019    Type 2 diabetes mellitus with hyperglycemia, without long-term current use of insulin 12/21/2017     Hemoglobin A1C   Date Value Ref Range Status   06/24/2019 7.7 (H) 4.0 - 5.6 % Final     Comment:     ADA Screening Guidelines:  5.7-6.4%  Consistent with prediabetes  >or=6.5%  Consistent with diabetes  High levels of fetal hemoglobin interfere with the HbA1C  assay. Heterozygous hemoglobin variants (HbS, HgC, etc)do  not significantly interfere with this assay.   However, presence of multiple variants may affect accuracy.       Lab Results   Component Value Date    HGBA1C 8.3 (H) 12/22/2017    HGBA1C 8.5 (H) 01/31/2017    HGBA1C 6.6 (H) 03/22/2016       Diabetes medications include: Levemir  70 units at night  Metformin  1000mg bid and Jardiance 10mg daily    She has gradually lost weight on the  jardiance, but also since 2017  she has been at a plateau with her weight.  She admits she has not been exercising regularly and her diet could improve    Took victoza and byetta in past early with dx when she was better controlled and these actually caused her sugars to drop -I recommended however restarting Victoza at last visit so she has been taking this daily    Tried glyburide in the past    Intolerant of metformin    Interval history:  About 3 weeks ago patient developed a vaginal discharge she was concerned was yeast and she notified her PCP who prescribed her terconazole which she took for 3 days and it seemed to improve it except she started noticing irritation and some mild itching along the vulvar area.  She read about side effects of jardiance and wanted to know if this was causing the rash.  She wants me to take a look at it today in the office.  Denies any fevers or chills are pain in the area    She is being followed by Cardiology for history of tachycardia and an echocardiogram and Holter were ordered and also has hypertension    History of obstructive sleep apnea but not using CPAP    Diabetes Complications:peripheral neuropathy  Toes and fingers have numbness and Cymbalta helps she says  Hypoglycemia: NO      Meal Planning:  Trying to lower carbohydrate intake    Diabetes education: YES:  2019 has started seeing diabetes educator    SMBG: Tests BG four times a day    Log or meter available: no-  POC Glucose   Date Value Ref Range Status   05/16/2019 134 (A) 70 - 110 MG/DL Final       Home values if available:  FBG:  Lately fasting sugars have been running high  Pre-lunch:  Pre-dinner:  Bedtime:  Post Breakfast:  Post Lunch  Post Dinner  Ranges: later in day 200s occ, 160 to 180s     Exercise: No     STANDARDS of CARE:        ACE inhibitor or angiotensin II receptor blocker:  Yes        Statin drug:  Yes        Eye exam within last year: Yes        Influenza vaccine up to date: Yes        Pneumonia  vaccine:yes         No family history of medullary thyroid cancer and no history of pancreatitis    I have reviewed the past medical, family and social history    Review of Systems   Constitutional: Negative for appetite change, fatigue, fever and unexpected weight change.   HENT: Negative for sore throat and trouble swallowing.    Eyes: Negative for visual disturbance.   Respiratory: Negative for shortness of breath and wheezing.    Cardiovascular: Negative for chest pain, palpitations and leg swelling.   Gastrointestinal: Negative for diarrhea, nausea and vomiting.   Endocrine: Negative for cold intolerance, heat intolerance, polydipsia, polyphagia and polyuria.   Genitourinary: Negative for difficulty urinating, dysuria and menstrual problem.   Musculoskeletal: Negative for arthralgias and joint swelling.   Skin: Positive for rash.        Involved her area   Neurological: Negative for dizziness, weakness, numbness and headaches.   Psychiatric/Behavioral: Negative for confusion, dysphoric mood and sleep disturbance.       Objective:      Physical Exam   Constitutional: She appears well-developed and well-nourished. No distress.   HENT:   Head: Normocephalic and atraumatic.   Eyes: Conjunctivae are normal. No scleral icterus.   Genitourinary: Pelvic exam was performed with patient supine. There is rash on the right labia. There is rash on the left labia.   Genitourinary Comments: Patient has some mild swelling and appears like inflammation and irritation of both outer labial walls, no obvious lesions or discharge and does not involve the perinum, perirectal area, not painful to touch   Musculoskeletal: She exhibits no edema.   Neurological: She is alert. No sensory deficit.        Skin: Skin is warm and dry. No rash noted. She is not diaphoretic. No erythema.   Psychiatric: She has a normal mood and affect. Her behavior is normal.   Vitals reviewed.        Lab Review:   Lab Visit on 08/12/2019   Component Date  Value    Uric Acid 08/12/2019 6.0*   Clinical Support on 07/08/2019   Component Date Value    Interpretation 07/08/2019                      Value:  Normal spirometry. (FEV1/VC greater than or equal to LLN and FVC greater than or equal to LLN)  Normal airflow. (FEV1/VC greater than or equal to LLN)  Normal lung volumes. (TLC > or equal to LLN)  Maximum voluntary ventilation is moderately reduced. (MVV% predicted is 51% to 65% of predicted)  Mild reduction in diffusion capacity -unadjusted for hemoglobin (DLCO > or equal to 60% predicted and < LLN) .    Corrected for alveolar volume diffusion capacity was normal.      Pre FVC 07/08/2019 1.80     Pre FEV1 07/08/2019 1.44     Pre FEV1 FVC 07/08/2019 80.40     Pre FEF 25 75 07/08/2019 1.50     Pre PEF 07/08/2019 4.12     Pre  07/08/2019 7.50     Pre MVV 07/08/2019 39.00*    Pre DLCO 07/08/2019 12.99*    DLCO ADJ PRE 07/08/2019 12.99*    DLCOVA PRE 07/08/2019 5.00     DLVAAdj PRE 07/08/2019 5.00     VA PRE 07/08/2019 2.60*    IVC PRE 07/08/2019 1.71     Pre TLC 07/08/2019 3.33     VC PRE 07/08/2019 1.83     Pre FRC PL 07/08/2019 1.59     Pre ERV 07/08/2019 0.14*    Pre RV 07/08/2019 1.50     RVTLC PRE 07/08/2019 44.96*    Raw PRE 07/08/2019 5.70*    VTGRAWPRE 07/08/2019 1.81     FVC Ref 07/08/2019 2.22     FVC LLN 07/08/2019 1.71     FVC Pre Ref 07/08/2019 80.8     FEV1 Ref 07/08/2019 1.84     FEV1 LLN 07/08/2019 1.40     FEV1 Pre Ref 07/08/2019 78.6     FEV1 FVC Ref 07/08/2019 83     FEV1 FVC LLN 07/08/2019 72     FEV1 FVC Pre Ref 07/08/2019 97.3     FEF 25 75 Ref 07/08/2019 2.09     FEF 25 75 LLN 07/08/2019 1.05     FEF 25 75 Pre Ref 07/08/2019 71.7     PEF Ref 07/08/2019 5.10     PEF LLN 07/08/2019 3.57     PEF Pre Ref 07/08/2019 80.9     MVV Ref 07/08/2019 73     MVV LLN 07/08/2019 62     MVV Pre Ref 07/08/2019 53.2     TLC Ref 07/08/2019 3.58     TLC LLN 07/08/2019 2.60     TLC Pre Ref 07/08/2019 92.8     VC Ref  07/08/2019 2.22     VC LLN 07/08/2019 1.71     VC Pre Ref 07/08/2019 82.3     FRCpleth Ref 07/08/2019 2.23     FRCpleth LLN 07/08/2019 1.41     FRCpleth PreRef 07/08/2019 71.6     ERV Ref 07/08/2019 0.91     ERV LLN 07/08/2019 0.91     ERV Pre Ref 07/08/2019 15.7     RV Ref 07/08/2019 1.32     RV LLN 07/08/2019 0.75     RV Pre Ref 07/08/2019 113.1     RVTLC Ref 07/08/2019 35     RVTLC LLN 07/08/2019 25     RVTLC Pre Ref 07/08/2019 128.7     Raw Ref 07/08/2019 3.06     Raw LLN 07/08/2019 3.06     Raw Pre Ref 07/08/2019 186.2     DLCO Single Breath Ref 07/08/2019 19.63     DLCO Single Breath LLN 07/08/2019 13.89     DLCO Single Breath Pre R* 07/08/2019 66.2     DLCOc Single Breath Ref 07/08/2019 19.63     DLCOc Single Breath LLN 07/08/2019 13.89     DLCOc Single Breath Pre * 07/08/2019 66.2     DLCOVA Ref 07/08/2019 5.48     DLCOVA LLN 07/08/2019 3.28     DLCOVA Pre Ref 07/08/2019 91.3     DLCOc SBVA Ref 07/08/2019 5.48     DLCOc SBVA LLN 07/08/2019 3.28     DLCOc SBVA Pre Ref 07/08/2019 91.3     VA Single Breath Ref 07/08/2019 3.43     VA Single Breath LLN 07/08/2019 3.43     VA Single Breath Pre Ref 07/08/2019 75.7     IVC Single Breath Ref 07/08/2019 2.22     IVC Single Breath LLN 07/08/2019 1.71     IVC Single Breath Pre Ref 07/08/2019 77.1    Lab Visit on 06/24/2019   Component Date Value    Hemoglobin A1C 06/24/2019 7.7*    Estimated Avg Glucose 06/24/2019 174*   Clinical Support on 06/06/2019   Component Date Value    QEF 06/06/2019 60     Diastolic Dysfunction 06/06/2019 Yes*   Office Visit on 05/16/2019   Component Date Value    POC Glucose 05/16/2019 134*   Lab Visit on 04/25/2019   Component Date Value    Microalbum.,U,Random 04/25/2019 7.0     Creatinine, Random Ur 04/25/2019 67.0     Microalb Creat Ratio 04/25/2019 10.4    Lab Visit on 04/25/2019   Component Date Value    Cholesterol 04/25/2019 142     Triglycerides 04/25/2019 328*    HDL 04/25/2019 36*     "LDL Cholesterol 04/25/2019 40.4*    Hdl/Cholesterol Ratio 04/25/2019 25.4     Total Cholesterol/HDL Ra* 04/25/2019 3.9     Non-HDL Cholesterol 04/25/2019 106     Vit D, 25-Hydroxy 04/25/2019 15*       Assessment:     1. Type 2 diabetes, uncontrolled, with neuropathy  insulin aspart U-100 (NOVOLOG FLEXPEN U-100 INSULIN) 100 unit/mL (3 mL) InPn pen    pen needle, diabetic (BD ULTRA-FINE MINI PEN NEEDLE) 31 gauge x 3/16" Ndle    insulin detemir U-100 (LEVEMIR FLEXTOUCH) 100 unit/mL (3 mL) SubQ InPn pen   2. Vulvovaginitis  Ambulatory referral to Obstetrics / Gynecology   3. Diabetic polyneuropathy associated with type 2 diabetes mellitus  insulin detemir U-100 (LEVEMIR FLEXTOUCH) 100 unit/mL (3 mL) SubQ InPn pen    fluconazole (DIFLUCAN) 150 MG Tab    Neuropathy is stable   4. Bipolar 1 disorder      Medications used to treat her psychiatric disorder may increase risk of obesity and insulin resistance and diabetes   5. Schizoaffective disorder, bipolar type      As in number four    suspect her vulvar area rash is related to fungal infection that is is exacerbated by her hyperglycemia from her diabetes being uncontrolled and also the jardiance can increase the risk of this by causing glucosuria.  Since she has already had treatment and has ongoing symptoms I recommend stopping the  jardiance, will prescribe Diflucan but I would like for gynecology to evaluate her to ensure there is no other possible cause of her vulvar vaginal findings so I placed a referral and will assist with getting her scheduled with them.  It does not at all appear to be consistent with Tawanda's gangrene, which can be a rare complication of jardiance .  I did advise her however it should she have severe pain or fever or worsening of symptoms to seek emergency care.    Also will try to improve upon her diabetes by adding mealtime insulin, NovoLog per scale be low and increase Levemir to 76 units and continue metformin and Victoza for " "now    Again advised her to always bring her meter, record of blood sugars to every visit, will try to enroll her in the digital Diabetes program, she is currently in the digital hypertension Program, however [facility] is not allowing me to so I will reach out to the specialist for further assistance :    New diabetic regimen:  ncrease Levemir to 76 units    Take Novolog before meals as in scale below    Add correction using Humalog, Novolog, or Apidra:  Blood sugar 130 to 200 add 6 units  Blood sugar 201 to 250 add 7units  Blood sugar 251 to 300 add 8 units  Blood sugar greater than 300 add 10 units    Stay on metformin and victoza    Stop jardiance        Plan:   Type 2 diabetes, uncontrolled, with neuropathy  -     insulin aspart U-100 (NOVOLOG FLEXPEN U-100 INSULIN) 100 unit/mL (3 mL) InPn pen; Inject before meals TID AC up to 30 units daily  Dispense: 15 mL; Refill: 3  -     pen needle, diabetic (BD ULTRA-FINE MINI PEN NEEDLE) 31 gauge x 3/16" Ndle; Use 5 a day  Dispense: 200 each; Refill: 3  -     insulin detemir U-100 (LEVEMIR FLEXTOUCH) 100 unit/mL (3 mL) SubQ InPn pen; Inject 76 Units into the skin every evening.  Dispense: 25 mL; Refill: 3    Vulvovaginitis  -     Ambulatory referral to Obstetrics / Gynecology    Diabetic polyneuropathy associated with type 2 diabetes mellitus  Comments:  Neuropathy is stable  Orders:  -     insulin detemir U-100 (LEVEMIR FLEXTOUCH) 100 unit/mL (3 mL) SubQ InPn pen; Inject 76 Units into the skin every evening.  Dispense: 25 mL; Refill: 3  -     fluconazole (DIFLUCAN) 150 MG Tab; Take 1 tablet (150 mg total) by mouth once daily. for 1 day  Dispense: 1 tablet; Refill: 0    Bipolar 1 disorder  Comments:  Medications used to treat her psychiatric disorder may increase risk of obesity and insulin resistance and diabetes    Schizoaffective disorder, bipolar type  Comments:  As in number four          Follow up in about 2 months (around 11/4/2019).    Labs prior to appointment? not " applicable     Disclaimer:  This note may have been partially prepared using voice recognition software and  it may have not been extensively proofed, as such there could be errors within the text such as sound alike errors.

## 2019-09-04 NOTE — PATIENT INSTRUCTIONS
Increase Levemir to 76 units    Take Novolog before meals as in scale below    Add correction using Humalog, Novolog, or Apidra:  Blood sugar 130 to 200 add 6 units  Blood sugar 201 to 250 add 7units  Blood sugar 251 to 300 add 8 units  Blood sugar greater than 300 add 10 units    Stay on metformin and victoza    Stop jardiance

## 2019-09-05 ENCOUNTER — PATIENT OUTREACH (OUTPATIENT)
Dept: OTHER | Facility: OTHER | Age: 49
End: 2019-09-05

## 2019-09-05 ENCOUNTER — PATIENT MESSAGE (OUTPATIENT)
Dept: ENDOCRINOLOGY | Facility: CLINIC | Age: 49
End: 2019-09-05

## 2019-09-05 LAB
HAV IGM SERPL QL IA: NEGATIVE
HBV CORE IGM SERPL QL IA: NEGATIVE
HBV SURFACE AG SERPL QL IA: NEGATIVE
HCV AB SERPL QL IA: NEGATIVE
HIV 1+2 AB+HIV1 P24 AG SERPL QL IA: NEGATIVE
RPR SER QL: NORMAL

## 2019-09-05 NOTE — PROGRESS NOTES
Subjective:       Patient ID: Yolanda Betts is a 49 y.o. female.    Chief Complaint:  Vaginitis      History of Present Illness  HPI  50 yo  presents as new pt with ?ulcerations on labia. Symptoms began 2-3 weeks ago, she thought it was yeast infection but cream provided short term relief. No HSV history, has not been sexually active x 2years. Desires STD testing. Very painful when voids. Denies abnormal discharge or excessive pruritis    Past Medical History:   Diagnosis Date    Abnormal Pap smear of cervix     HPV genital warts    Anemia     Anxiety     Arthritis     Asthma 10/11/2016    Bipolar 1 disorder     Diabetes mellitus, type 2     Dyslipidemia associated with type 2 diabetes mellitus 2019    Genital warts     GERD (gastroesophageal reflux disease)     Hypertension     Hypertension complicating diabetes 2019    Mild persistent asthma without complication 10/11/2016    Morbid obesity with body mass index (BMI) of 45.0 to 49.9 in adult 8/3/2017    Obstructive sleep apnea     Schizoaffective disorder, bipolar type 2019    Seasonal allergic rhinitis due to pollen 2019    Type 2 diabetes mellitus with hyperglycemia, without long-term current use of insulin 2017       Past Surgical History:   Procedure Laterality Date    BELT ABDOMINOPLASTY      BREAST SURGERY Bilateral     Reduction     SECTION      X 2    COLONOSCOPY      COLONOSCOPY N/A 2018    [facility]    colonoscopy for iron deficiency anemia N/A 2018    [facility]    ESOPHAGOGASTRODUODENOSCOPY (EGD) N/A 2018    [facility]    HYSTERECTOMY      w/ BSO; hypermenorrhea    MOUTH SURGERY      OOPHORECTOMY      hyst/bso, hypermenorrhea    TUBAL LIGATION         Family History   Problem Relation Age of Onset    Diabetes Mother      Hyperlipidemia Mother     Hypertension Mother     Asthma Mother     COPD Mother     Glaucoma Mother     Thyroid disease Mother     Hypertension Father     Hyperlipidemia Father     Cancer Father         Brain, lung, liver, kidney    Heart disease Maternal Grandmother     Hyperlipidemia Maternal Grandmother     Hypertension Maternal Grandmother     Cataracts Maternal Grandmother     Diabetes Maternal Grandmother     Heart disease Maternal Grandfather     Hyperlipidemia Maternal Grandfather     Hypertension Maternal Grandfather     Glaucoma Maternal Grandfather     Cancer Maternal Grandfather     Cataracts Maternal Grandfather     Macular degeneration Maternal Grandfather     Diabetes Maternal Grandfather     Heart disease Paternal Grandmother     Hyperlipidemia Paternal Grandmother     Hypertension Paternal Grandmother     Cataracts Paternal Grandmother     Heart disease Paternal Grandfather     Hyperlipidemia Paternal Grandfather     Hypertension Paternal Grandfather     Cataracts Paternal Grandfather     Breast cancer Maternal Cousin     Breast cancer Maternal Cousin     Breast cancer Maternal Cousin     Breast cancer Maternal Cousin        Social History     Socioeconomic History    Marital status: Single     Spouse name: Not on file    Number of children: Not on file    Years of education: Not on file    Highest education level: Not on file   Occupational History    Not on file   Social Needs    Financial resource strain: Not on file    Food insecurity:     Worry: Not on file     Inability: Not on file    Transportation needs:     Medical: Not on file     Non-medical: Not on file   Tobacco Use    Smoking status: Never Smoker    Smokeless tobacco: Never Used   Substance and Sexual Activity    Alcohol use: Yes     Alcohol/week: 0.0 oz     Comment: socially    Drug use: No    Sexual activity: Not Currently     Partners: Male   Lifestyle    Physical activity:     Days  "per week: Not on file     Minutes per session: Not on file    Stress: Not on file   Relationships    Social connections:     Talks on phone: Not on file     Gets together: Not on file     Attends [employer] service: Not on file     Active member of club or organization: Not on file     Attends meetings of clubs or organizations: Not on file     Relationship status: Not on file   Other Topics Concern    Not on file   Social History Narrative    Long-term care nurse       Current Outpatient Medications   Medication Sig Dispense Refill    albuterol (PROVENTIL/VENTOLIN HFA) 90 mcg/actuation inhaler Inhale 2 puffs into the lungs every 4 (four) hours as needed. 8.5 g 5    ALPRAZolam (XANAX) 2 MG Tab Take 2 mg by mouth 2 (two) times daily.       aspirin (ECOTRIN) 81 MG EC tablet Take 81 mg by mouth once daily.      atorvastatin (LIPITOR) 20 MG tablet Take 1 tablet (20 mg total) by mouth every evening. 90 tablet 3    BD INSULIN SYRINGE ULTRA-FINE 1/2 mL 30 gauge x 1/2" Syrg   0    benztropine (COGENTIN) 1 MG tablet Take 1 mg by mouth nightly.  1    blood sugar diagnostic Strp Check blood glucose 3 times daily as directed and as needed (dispense insurance preferred brand or patient choice) 100 each 11    blood-glucose meter Misc use 3 times daily as directed 1 each 0    cycloSPORINE (RESTASIS) 0.05 % ophthalmic emulsion Place 0.4 mLs (1 drop total) into both eyes 2 (two) times daily. 60 each 11    duloxetine (CYMBALTA) 60 MG capsule Take 60 mg by mouth 2 (two) times daily.  2    empagliflozin (JARDIANCE) 10 mg Tab Take 1 tablet (10 mg) by mouth every morning. 30 tablet 5    ergocalciferol (ERGOCALCIFEROL) 50,000 unit Cap Take 1 capsule (50,000 Units total) by mouth every 14 (fourteen) days. 4 capsule 5    fish oil-omega-3 fatty acids 300-1,000 mg capsule Take 1 capsule by mouth once daily.      fluconazole (DIFLUCAN) 150 MG Tab Take 1 tablet (150 mg total) by mouth once daily. for 1 day 1 tablet 0    " "fluticasone propionate (FLONASE) 50 mcg/actuation nasal spray 2 sprays (100 mcg total) by Each Nare route once daily. 16 g 11    gabapentin (NEURONTIN) 100 MG capsule Take 1 capsule (100 mg total) by mouth 3 (three) times daily. 90 capsule 3    insulin aspart U-100 (NOVOLOG FLEXPEN U-100 INSULIN) 100 unit/mL (3 mL) InPn pen Inject before meals TID AC up to 30 units daily 15 mL 3    insulin detemir U-100 (LEVEMIR FLEXTOUCH) 100 unit/mL (3 mL) SubQ InPn pen Inject 76 Units into the skin every evening. 25 mL 3    lancets 28 gauge Misc use as directed 3 times daily 100 each 11    LATUDA 120 mg Tab       LINZESS 145 mcg Cap capsule Take 145 mcg by mouth as needed.   3    liraglutide 0.6 mg/0.1 mL, 18 mg/3 mL, subq PNIJ (VICTOZA 2-MILENA) 0.6 mg/0.1 mL (18 mg/3 mL) PnIj Use 1.8mg under the skin daily 9 mL 3    metFORMIN (GLUCOPHAGE) 1000 MG tablet TAKE 1 TABLET (1,000 MG TOTAL) BY MOUTH 2 (TWO) TIMES DAILY. 180 tablet 11    metoprolol succinate (TOPROL-XL) 100 MG 24 hr tablet Take 1 tablet (100 mg total) by mouth once daily. 30 tablet 5    mirtazapine (REMERON) 45 MG tablet Take 45 mg by mouth every evening.      mometasone (ASMANEX TWISTHALER) 220 mcg (120 doses) AePB Inhale 2 puffs into the lungs 2 (two) times daily. Controller 1 each 11    montelukast (SINGULAIR) 10 mg tablet Take 1 tablet (10 mg total) by mouth nightly. 30 tablet 11    MULTIVIT,CALC,MINS/IRON/FOLIC (DAILY MULTIPLE FOR WOMEN ORAL) Take 1 tablet by mouth once daily.      nitroGLYCERIN (NITROSTAT) 0.4 MG SL tablet Dissolve 1 tablet under the tongue every 5 minutes as needed, up to 3 times. If chest pain persists, call 911.      pen needle, diabetic (BD ULTRA-FINE MINI PEN NEEDLE) 31 gauge x 3/16" Ndle Use 5 a day 200 each 3    promethazine (PHENERGAN) 25 MG tablet Take 1 tablet (25 mg total) by mouth every 4 to 6 hours as needed for Nausea. sedating 20 tablet 0    valsartan-hydrochlorothiazide (DIOVAN-HCT) 320-25 mg per tablet Take 1 tablet " by mouth once daily. 90 tablet 3    zolpidem (AMBIEN) 10 mg Tab Take 10 mg by mouth nightly as needed.      lidocaine (XYLOCAINE) 5 % Oint ointment Apply topically as needed. 35.44 g 0    valACYclovir (VALTREX) 1000 MG tablet Take 1 tablet (1,000 mg total) by mouth 2 (two) times daily. for 10 days 20 tablet 0     No current facility-administered medications for this visit.        Review of patient's allergies indicates:   Allergen Reactions    Codeine Itching    Lortab [hydrocodone-acetaminophen] Itching    Neuromuscular blockers, steroidal      some    Latex, natural rubber Rash    Morphine Rash     itching    Seconal [secobarbital sodium] Rash     itching    Tylox [oxycodone-acetaminophen] Rash         GYN & OB History  No LMP recorded. Patient has had a hysterectomy.   Date of Last Pap: No result found    OB History    Para Term  AB Living   2 2   2   2   SAB TAB Ectopic Multiple Live Births           2      # Outcome Date GA Lbr Nirmal/2nd Weight Sex Delivery Anes PTL Lv   2  96 34w0d   F CS-LTranv EPI  DANIELE   1  94 32w0d   F CS-LTranv EPI  DANIELE       Review of Systems  Review of Systems   All other systems reviewed and are negative.    Objective:     Physical Exam   Constitutional: She is oriented to person, place, and time. She appears well-developed and well-nourished.   Pulmonary/Chest: Effort normal.   Genitourinary:   Genitourinary Comments: Left labia & superior labial commissure w/ ulcers.     Musculoskeletal: Normal range of motion.   Neurological: She is alert and oriented to person, place, and time.   Skin: Skin is warm and dry.   Psychiatric: She has a normal mood and affect. Her behavior is normal.        Assessment:        1. Genital ulcer, female    2. Screen for STD (sexually transmitted disease)       Plan:      1. HIV, RPR, hep panel, HSV-1 & 2 igG & igM, HSV culture  2. Lidocaine topical, valtrex x 10d  3. Pathophysiology of HSV discussed, info  printed

## 2019-09-05 NOTE — PROGRESS NOTES
Last 5 Patient Entered Readings                                      Current 30 Day Average: 141/97     Recent Readings 8/17/2019 8/16/2019 8/14/2019 8/14/2019 8/12/2019    SBP (mmHg) 145 137 156 156 127    DBP (mmHg) 96 90 104 107 89    Pulse 86 88 87 89 91        Digital Medicine: Health  Follow Up    Lifestyle Modifications:  Dietary Modifications (Sodium intake <2,000mg/day, food labels, dining out):   Patient stated that she has been struggling with sticking to the 2300 kcal diet set forth by her nutritionists. Patient stated that she needs to have more vegetables in the house to help her stay on top of her diet.     Physical Activity:   Patient stated that she is still having trouble finding time to squeeze in some physical activity. Encouraged patient to try and fit in physical activity whenever she can.    Medication Therapy:   Patient has been compliant with the medication regimen.    Patient has the following medication side effects/concerns: none  (Frequency/Alleviating factors/Precipitating factors, etc.)     Follow up with Ms. Yolanda Martin Alpesh completed. No further questions or concerns. Will continue to follow up to achieve health goals.    Notes:  Patient hasn't submitted a reading since 8/17/19. Patient states that her cuff is having trouble connecting to the phone and that she plans to take the cuff to the [facility] today. Will f/u in 4 weeks to allow patient time to get cuff looked at and start submitting some readings again.

## 2019-09-06 LAB — HSV AB, IGM BY EIA: NEGATIVE

## 2019-09-07 LAB
HSV1 DNA SPEC QL NAA+PROBE: NEGATIVE
HSV2 DNA SPEC QL NAA+PROBE: NEGATIVE
SPECIMEN SOURCE: NORMAL

## 2019-09-09 ENCOUNTER — TELEPHONE (OUTPATIENT)
Dept: OBSTETRICS AND GYNECOLOGY | Facility: CLINIC | Age: 49
End: 2019-09-09

## 2019-09-09 ENCOUNTER — DOCUMENTATION ONLY (OUTPATIENT)
Dept: REHABILITATION | Facility: HOSPITAL | Age: 49
End: 2019-09-09

## 2019-09-09 RX ORDER — NYSTATIN AND TRIAMCINOLONE ACETONIDE 100000; 1 [USP'U]/G; MG/G
CREAM TOPICAL
Qty: 30 G | Refills: 1 | Status: SHIPPED | OUTPATIENT
Start: 2019-09-09 | End: 2019-10-04

## 2019-09-09 NOTE — TELEPHONE ENCOUNTER
Spoke to pt about labs (preliminary +HSV 1-IgG). Pt reports taking valtrex bid but no relief. Will try mycolog

## 2019-09-09 NOTE — TELEPHONE ENCOUNTER
----- Message from Josselyn Stringer sent at 9/9/2019  9:17 AM CDT -----  Contact: Patient  Type:  Needs Medical Advice    Who Called: Patient  Symptoms (please be specific): Ulcers on vagina   How long has patient had these symptoms:    Pharmacy name and phone #:    Would the patient rather a call back or a response via [facility]? call  Best Call Back Number: [phone]  Additional Information: Patient still having trouble with ulcers on her vagina.

## 2019-09-09 NOTE — PROGRESS NOTES
Outpatient Therapy Discharge Summary     Name: Yolanda Jacobsston  Clinic Number: [id number]    Therapy Diagnosis: Chronic neck pain, chronic bilateral low back pain without sciatica  Physician: Abilio Gibbs MD    Physician Orders: PT Eval and Treat  Medical Diagnosis: OA, unspecified type and site  Evaluation Date: 5/23/19      Date of Last visit: 6/5/19  Total Visits Received: 3  Cancelled Visits: 1  No Show Visits: 2    Assessment    Goals: Patient did not come back for subsequent visits, so was not able to re-assess goals.     Discharge reason: Patient has not attended therapy since 6/5/19    Plan   This patient is discharged from Physical Therapy

## 2019-09-09 NOTE — TELEPHONE ENCOUNTER
Patient called stating that her HSV was negative, and is still having the same issues.  Patient would like to know what she needs to do.  I see that her HSV is positive for type 1.  Please advise.

## 2019-09-10 ENCOUNTER — OFFICE VISIT (OUTPATIENT)
Dept: RHEUMATOLOGY | Facility: CLINIC | Age: 49
End: 2019-09-10
Payer: MEDICAID

## 2019-09-10 VITALS
BODY MASS INDEX: 45.43 KG/M2 | SYSTOLIC BLOOD PRESSURE: 144 MMHG | WEIGHT: 201.94 LBS | HEIGHT: 56 IN | HEART RATE: 94 BPM | DIASTOLIC BLOOD PRESSURE: 92 MMHG

## 2019-09-10 DIAGNOSIS — M79.7 FIBROMYALGIA: Chronic | ICD-10-CM

## 2019-09-10 DIAGNOSIS — E66.01 MORBID OBESITY: ICD-10-CM

## 2019-09-10 DIAGNOSIS — M15.9 PRIMARY OSTEOARTHRITIS INVOLVING MULTIPLE JOINTS: Primary | ICD-10-CM

## 2019-09-10 DIAGNOSIS — M65.9 STENOSING TENOSYNOVITIS: ICD-10-CM

## 2019-09-10 LAB
HSV1 IGG SERPL QL IA: POSITIVE
HSV2 IGG SERPL QL IA: ABNORMAL

## 2019-09-10 PROCEDURE — 99999 PR PBB SHADOW E&M-EST. PATIENT-LVL III: ICD-10-PCS | Mod: PBBFAC,,, | Performed by: INTERNAL MEDICINE

## 2019-09-10 PROCEDURE — 99214 OFFICE O/P EST MOD 30 MIN: CPT | Mod: S$PBB,,, | Performed by: INTERNAL MEDICINE

## 2019-09-10 PROCEDURE — 99213 OFFICE O/P EST LOW 20 MIN: CPT | Mod: PBBFAC | Performed by: INTERNAL MEDICINE

## 2019-09-10 PROCEDURE — 99214 PR OFFICE/OUTPT VISIT, EST, LEVL IV, 30-39 MIN: ICD-10-PCS | Mod: S$PBB,,, | Performed by: INTERNAL MEDICINE

## 2019-09-10 PROCEDURE — 99999 PR PBB SHADOW E&M-EST. PATIENT-LVL III: CPT | Mod: PBBFAC,,, | Performed by: INTERNAL MEDICINE

## 2019-09-10 RX ORDER — GABAPENTIN 300 MG/1
600 CAPSULE ORAL 3 TIMES DAILY
Qty: 90 CAPSULE | Refills: 3 | Status: SHIPPED | OUTPATIENT
Start: 2019-09-10 | End: 2019-12-17 | Stop reason: SDUPTHER

## 2019-09-10 RX ORDER — DICLOFENAC SODIUM 10 MG/G
2 GEL TOPICAL 4 TIMES DAILY
Qty: 1 TUBE | Refills: 2 | Status: SHIPPED | OUTPATIENT
Start: 2019-09-10 | End: 2019-10-04

## 2019-09-10 NOTE — PROGRESS NOTES
RHEUMATOLOGY OUTPATIENT CLINIC NOTE    9/10/2019    Attending Rheumatologist: Abilio Gibbs  Primary Care Provider: CHARLA Beach MD   Physician Requesting Consultation: No referring provider defined for this encounter.  Chief Complaint/Reason For Consultation:  Chronic pain.    Subjective:       HPI  Yolanda Betts is a 49 y.o. Black or  female with chronic pain comes for follow-up.    Today  Last seen on May.  Presentation consistent with degenerative joint disease for which was recommended to resume gabapentin and performed physical therapy for tendinopathy.  No acute complaints today.  Relief reported to shoulder tendinopathy after physical therapy.  Tolerating gabapentin well.  Main complaint is intermittent left index joint pain.  Worst in the evening, aggravated by range of motion, relieved somewhat by rest and OTC pain medication.  Associated with intermittent painful locking/catching which she sometimes needs to pry open.  Denies precipitation or joint pain with any particular food/beverage intake, association with prolonged morning stiffness, redness, or joint swelling.    Review of Systems   Constitutional: Positive for malaise/fatigue. Negative for fever.   HENT:        Denies significant eye or mouth sicca symptoms, denies oral ulcers, denies parotid swelling, denies swollen glands.   Eyes: Negative for blurred vision and redness.   Respiratory: Negative for cough and shortness of breath.    Cardiovascular: Negative for chest pain and leg swelling.   Gastrointestinal: Negative for blood in stool and melena.   Genitourinary: Negative for dysuria and hematuria.   Musculoskeletal: Positive for joint pain (Generalized, Mechanical pattern with neuropathic features.). Negative for back pain (No inflammatory or alarm features.).   Skin: Negative for rash.   Neurological: Positive for tingling (Hx of neuropathy). Negative for focal weakness and weakness.   Psychiatric/Behavioral:  Negative for memory loss and substance abuse.   All other systems reviewed and are negative.    Chronic comorbid conditions affecting medical decision making today:  Past Medical History:   Diagnosis Date    Abnormal Pap smear of cervix     HPV genital warts    Anemia     Anxiety     Arthritis     Asthma 10/11/2016    Bipolar 1 disorder     Diabetes mellitus, type 2     Dyslipidemia associated with type 2 diabetes mellitus 2019    Genital warts     GERD (gastroesophageal reflux disease)     Hypertension     Hypertension complicating diabetes 2019    Mild persistent asthma without complication 10/11/2016    Morbid obesity with body mass index (BMI) of 45.0 to 49.9 in adult 8/3/2017    Obstructive sleep apnea     Schizoaffective disorder, bipolar type 2019    Seasonal allergic rhinitis due to pollen 2019    Type 2 diabetes mellitus with hyperglycemia, without long-term current use of insulin 2017     Past Surgical History:   Procedure Laterality Date    BELT ABDOMINOPLASTY      BREAST SURGERY Bilateral     Reduction     SECTION      X 2    COLONOSCOPY      COLONOSCOPY N/A 2018    [facility]    colonoscopy for iron deficiency anemia N/A 2018    [facility]    ESOPHAGOGASTRODUODENOSCOPY (EGD) N/A 2018    [facility]    HYSTERECTOMY      w/ BSO; hypermenorrhea    MOUTH SURGERY      OOPHORECTOMY      hyst/bso, hypermenorrhea    TUBAL LIGATION       Family History   Problem Relation Age of Onset    Diabetes Mother     Hyperlipidemia Mother     Hypertension Mother     Asthma Mother     COPD Mother     Glaucoma Mother     Thyroid disease Mother     Hypertension Father     Hyperlipidemia Father     Cancer Father         Brain, lung, liver, kidney    Heart disease Maternal Grandmother     Hyperlipidemia Maternal  "Grandmother     Hypertension Maternal Grandmother     Cataracts Maternal Grandmother     Diabetes Maternal Grandmother     Heart disease Maternal Grandfather     Hyperlipidemia Maternal Grandfather     Hypertension Maternal Grandfather     Glaucoma Maternal Grandfather     Cancer Maternal Grandfather     Cataracts Maternal Grandfather     Macular degeneration Maternal Grandfather     Diabetes Maternal Grandfather     Heart disease Paternal Grandmother     Hyperlipidemia Paternal Grandmother     Hypertension Paternal Grandmother     Cataracts Paternal Grandmother     Heart disease Paternal Grandfather     Hyperlipidemia Paternal Grandfather     Hypertension Paternal Grandfather     Cataracts Paternal Grandfather     Breast cancer Maternal Cousin     Breast cancer Maternal Cousin     Breast cancer Maternal Cousin     Breast cancer Maternal Cousin      Social History     Substance and Sexual Activity   Alcohol Use Yes    Alcohol/week: 0.0 oz    Comment: socially     Social History     Tobacco Use   Smoking Status Never Smoker   Smokeless Tobacco Never Used     Social History     Substance and Sexual Activity   Drug Use No       Current Outpatient Medications:     albuterol (PROVENTIL/VENTOLIN HFA) 90 mcg/actuation inhaler, Inhale 2 puffs into the lungs every 4 (four) hours as needed., Disp: 8.5 g, Rfl: 5    ALPRAZolam (XANAX) 2 MG Tab, Take 2 mg by mouth 2 (two) times daily. , Disp: , Rfl:     aspirin (ECOTRIN) 81 MG EC tablet, Take 81 mg by mouth once daily., Disp: , Rfl:     atorvastatin (LIPITOR) 20 MG tablet, Take 1 tablet (20 mg total) by mouth every evening., Disp: 90 tablet, Rfl: 3    BD INSULIN SYRINGE ULTRA-FINE 1/2 mL 30 gauge x 1/2" Syrg, , Disp: , Rfl: 0    benztropine (COGENTIN) 1 MG tablet, Take 1 mg by mouth nightly., Disp: , Rfl: 1    blood sugar diagnostic Strp, Check blood glucose 3 times daily as directed and as needed (dispense insurance preferred brand or patient " choice), Disp: 100 each, Rfl: 11    blood-glucose meter Misc, use 3 times daily as directed, Disp: 1 each, Rfl: 0    cycloSPORINE (RESTASIS) 0.05 % ophthalmic emulsion, Place 0.4 mLs (1 drop total) into both eyes 2 (two) times daily., Disp: 60 each, Rfl: 11    duloxetine (CYMBALTA) 60 MG capsule, Take 60 mg by mouth 2 (two) times daily., Disp: , Rfl: 2    empagliflozin (JARDIANCE) 10 mg Tab, Take 1 tablet (10 mg) by mouth every morning., Disp: 30 tablet, Rfl: 5    ergocalciferol (ERGOCALCIFEROL) 50,000 unit Cap, Take 1 capsule (50,000 Units total) by mouth every 14 (fourteen) days., Disp: 4 capsule, Rfl: 5    fish oil-omega-3 fatty acids 300-1,000 mg capsule, Take 1 capsule by mouth once daily., Disp: , Rfl:     fluticasone propionate (FLONASE) 50 mcg/actuation nasal spray, 2 sprays (100 mcg total) by Each Nare route once daily., Disp: 16 g, Rfl: 11    gabapentin (NEURONTIN) 300 MG capsule, Take 2 capsules (600 mg total) by mouth 3 (three) times daily., Disp: 90 capsule, Rfl: 3    insulin aspart U-100 (NOVOLOG FLEXPEN U-100 INSULIN) 100 unit/mL (3 mL) InPn pen, Inject before meals TID AC up to 30 units daily, Disp: 15 mL, Rfl: 3    insulin detemir U-100 (LEVEMIR FLEXTOUCH) 100 unit/mL (3 mL) SubQ InPn pen, Inject 76 Units into the skin every evening., Disp: 25 mL, Rfl: 3    lancets 28 gauge Misc, use as directed 3 times daily, Disp: 100 each, Rfl: 11    LATUDA 120 mg Tab, , Disp: , Rfl:     lidocaine (XYLOCAINE) 5 % Oint ointment, Apply topically as needed., Disp: 35.44 g, Rfl: 0    LINZESS 145 mcg Cap capsule, Take 145 mcg by mouth as needed. , Disp: , Rfl: 3    liraglutide 0.6 mg/0.1 mL, 18 mg/3 mL, subq PNIJ (VICTOZA 2-MILENA) 0.6 mg/0.1 mL (18 mg/3 mL) PnIj, Use 1.8mg under the skin daily, Disp: 9 mL, Rfl: 3    metFORMIN (GLUCOPHAGE) 1000 MG tablet, TAKE 1 TABLET (1,000 MG TOTAL) BY MOUTH 2 (TWO) TIMES DAILY., Disp: 180 tablet, Rfl: 11    metoprolol succinate (TOPROL-XL) 100 MG 24 hr tablet, Take 1  "tablet (100 mg total) by mouth once daily., Disp: 30 tablet, Rfl: 5    mirtazapine (REMERON) 45 MG tablet, Take 45 mg by mouth every evening., Disp: , Rfl:     mometasone (ASMANEX TWISTHALER) 220 mcg (120 doses) AePB, Inhale 2 puffs into the lungs 2 (two) times daily. Controller, Disp: 1 each, Rfl: 11    montelukast (SINGULAIR) 10 mg tablet, Take 1 tablet (10 mg total) by mouth nightly., Disp: 30 tablet, Rfl: 11    MULTIVIT,CALC,MINS/IRON/FOLIC (DAILY MULTIPLE FOR WOMEN ORAL), Take 1 tablet by mouth once daily., Disp: , Rfl:     nitroGLYCERIN (NITROSTAT) 0.4 MG SL tablet, Dissolve 1 tablet under the tongue every 5 minutes as needed, up to 3 times. If chest pain persists, call 911., Disp: , Rfl:     nystatin-triamcinolone (MYCOLOG II) cream, Apply to affected area 2 times daily, Disp: 30 g, Rfl: 1    pen needle, diabetic (BD ULTRA-FINE MINI PEN NEEDLE) 31 gauge x 3/16" Ndle, Use 5 a day, Disp: 200 each, Rfl: 3    promethazine (PHENERGAN) 25 MG tablet, Take 1 tablet (25 mg total) by mouth every 4 to 6 hours as needed for Nausea. sedating, Disp: 20 tablet, Rfl: 0    valACYclovir (VALTREX) 1000 MG tablet, Take 1 tablet (1,000 mg total) by mouth 2 (two) times daily. for 10 days, Disp: 20 tablet, Rfl: 0    valsartan-hydrochlorothiazide (DIOVAN-HCT) 320-25 mg per tablet, Take 1 tablet by mouth once daily., Disp: 90 tablet, Rfl: 3    zolpidem (AMBIEN) 10 mg Tab, Take 10 mg by mouth nightly as needed., Disp: , Rfl:     diclofenac sodium (VOLTAREN) 1 % Gel, Apply 2 g topically 4 (four) times daily., Disp: 1 Tube, Rfl: 2     Objective:         Vitals:    09/10/19 1241   BP: (!) 144/92   Pulse: 94     Physical Exam   Nursing note and vitals reviewed.  Constitutional: She is oriented to person, place, and time and well-developed, well-nourished, and in no distress.   BMI 45.2   HENT:   Head: Normocephalic.   Eyes: Conjunctivae are normal. Pupils are equal, round, and reactive to light.   Absent " Episcleritis/scleritis.   Neck: Normal range of motion.   Cardiovascular: Normal rate and intact distal pulses.    Pulmonary/Chest: Effort normal. No respiratory distress.   Abdominal: Soft. She exhibits no distension.   Neurological: She is alert and oriented to person, place, and time. Gait normal.   absent sensory deficits  muscle strength 5/5 through.   Skin: No rash noted. No erythema.     Musculoskeletal: Normal range of motion. She exhibits tenderness (TTP volar aspect 2nd left PIP.  No discrete nodule felt.). She exhibits no edema or deformity.   : strong  No synovitis or significant stress tenderness.    AROM: intact  PROM: intact    Devices used by patient: none       Reviewed old and all outside pertinent medical records available.    All lab results personally reviewed and interpreted by me.  Lab Results   Component Value Date    WBC 7.29 12/22/2017    HGB 11.5 (L) 12/22/2017    HCT 36.5 (L) 12/22/2017    MCV 86 12/22/2017    MCH 26.9 (L) 12/22/2017    MCHC 31.5 (L) 12/22/2017    RDW 15.9 (H) 12/22/2017     12/22/2017    MPV 10.0 12/22/2017       Lab Results   Component Value Date     12/22/2017    K 3.9 12/22/2017     12/22/2017    CO2 27 12/22/2017     (H) 12/22/2017    BUN 8 12/22/2017    CALCIUM 9.5 12/22/2017    PROT 7.4 12/22/2017    ALBUMIN 3.7 12/22/2017    BILITOT 0.4 12/22/2017    AST 45 (H) 12/22/2017    ALKPHOS 89 12/22/2017    ALT 62 (H) 12/22/2017       Lab Results   Component Value Date    COLORU Yellow 08/29/2017    APPEARANCEUA Clear 08/26/2017    SPECGRAV 1.020 08/29/2017    PHUR 5 08/29/2017    PROTEINUA Negative 08/26/2017    KETONESU Neg 08/29/2017    LEUKOCYTESUR Negative 08/26/2017    NITRITE Neg 08/29/2017    UROBILINOGEN Norm 08/29/2017       Lab Results   Component Value Date    CRP 28.5 (H) 01/17/2017       Lab Results   Component Value Date    SEDRATE 43 (H) 01/17/2017       Lab Results   Component Value Date    RF <10.0 01/20/2017    SEDRATE 43  (H) 01/17/2017       No components found for: 25OHVITDTOT, 10REQJMK8, 63GBGQTO4, METHODNOTE    Lab Results   Component Value Date    URICACID 6.0 (H) 08/12/2019       No components found for: TSPOTTB    Rheum Labs:  ISAAC negative  Rheumatoid factor/CCP negative     Infectious Labs:  n/a     Imaging:  All imaging reviewed and independently  interpreted by me.    X-ray finger August 2019  suggestion of some mild soft tissue edema at the base of the index finger.  No acute fractures or dislocations visualized.  Mild degenerative findings noted at the DIP joint.     ASSESSMENT / PLAN:     Yolanda Betts is a 49 y.o. Black or  female with:    1. Osteoarthritis, unspecified osteoarthritis type, unspecified site  - tendinopathy improved after physical therapy  - continue range of motion, flexibility, and strengthening exercises.  - features of stenosing tenosynovitis left index finger.  Recommend for conservative management and tight glycemic control.  - will perform corticosteroid injection if refractory.  - splint use for several weeks, topical therapy with Voltaren gel and ice p.r.n.  - resting affected joint for brief periods (<12 h)  - Acetaminophen prn -> standing -> NSAIDs short course (if persistent pain)    2. Fibromyalgia  - remains with no features of active autoimmune arthritis or connective tissue disease.    - sleep hygiene, stress management  - continue with gabapentin as tolerated.  Script provided for 300 x2 mg t.i.d.  - continue with duloxetine unchanged  - manage expectations, reassurance, Physical activity    3. Other specified counseling  - over 10 minutes spent regarding below topics:  - Immunization counseling done.  - Weight loss counseling done.  - Nutrition and exercise counseling.  - Regular exercise:  Aerobic and resistance.  - Medication counseling provided.    4. Morbid Obesity  - would benefit from decreasing at least 10% of body weight.  - recommended goal of losing 1 lb  per week.    Follow up in about 4 months (around 1/10/2020).    Method of contact with patient concerns: [facility]    Disclaimer:  This note is prepared using voice recognition software and as such is likely to have errors and has not been proof read. Please contact me for questions.     Time spent: 25 minutes in face to face discussion concerning diagnosis, prognosis, review of lab and test results, benefits of treatment as well as management of disease, counseling of patient and coordination of care between various health care providers.  Greater than half the time spent was used for coordination of care and counseling of patient.    Abilio Gibbs M.D.  Rheumatology Department   [facility] [place] [facility]

## 2019-09-10 NOTE — PATIENT INSTRUCTIONS
What is Trigger Finger?  Trigger finger is an inflammation of tissue inside your finger or thumb. It is also called tenosynovitis (ten-oh-sin-oh-VY-tis). Tendons (cordlike fibers that attach muscle to bone and allow you to bend the joints) become swollen. So does the synovium (a slick membrane that allows the tendons to move easily). This makes it difficult to straighten the finger or thumb.    Causes  Repeated use of a tool with strong gripping, such as a drill or wrench, can irritate and inflame the tendons and the synovium. It is also more common in certain medical conditions such as rheumatoid arthritis, gout, and diabetes. But often the cause of trigger finger is unknown.  Inside your finger  Tendons connect muscles in your forearm to the bones in your fingers. The tendons in each finger are surrounded by a protective tendon sheath. This sheath is lined with synovium, which produces a fluid that allows the tendons to slide easily when you bend and straighten the finger. If a tendon is irritated, it becomes inflamed.  When a tendon is inflamed  When a tendon is inflamed, it causes the lining of the tendon sheath to swell and thicken. Or the tendon itself may thicken. Then the sheath pinches the tendon, and the tendon can no longer slide easily inside the sheath. When you straighten your finger, the tendon sticks or locks as it tries to squeeze back through the sheath.    Symptoms  The first sign of trigger finger may be pain where the finger or thumb joins the palm. You may also notice some swelling. As the tendon becomes more inflamed, the finger may start to catch when you try to straighten it. When the locked tendon releases, the finger jumps, as if you were releasing the trigger of a gun. This further irritates the tendon, and may set up a cycle of catching and swelling.   Date Last Reviewed: 9/28/2015  © 0790-5526 [employer]. [address] [place] [address], [place] [address]. All rights reserved.  This information is not intended as a substitute for professional medical care. Always follow your healthcare professional's instructions.        Treating Trigger Finger     The tendon sheath is opened to release the tendon. Once the tendon can move freely again, the finger can bend and straighten more normally.     Trigger finger occurs when the tissue inside your finger or thumb becomes inflamed. Mild cases can be treated without surgery. If the problem is severe, surgery may be needed. Your doctor will discuss your options with you.  Nonsurgical treatment  For mild symptoms, your doctor may have you rest the finger or thumb. You may also be told to take anti-inflammatory medicines. These include ibuprofen or aspirin. You may be given an injection of medicine in the base of the finger or thumb. This typically is a steroid, such as cortisone.  Surgery  If nonsurgical treatments dont ease your symptoms, surgery may be recommended. A tendon is a cordlike fiber that attaches muscle to bone and allows joints to bend. The tendon is surrounded by a protective cover called a sheath. During surgery, the sheath in your finger or thumb is opened to enlarge the space and release the swollen tendon. This allows the finger or thumb to bend and straighten normally. Surgery takes about 20 minutes. It can often be done using a local anesthetic. You may be able to go home the same day. Your hand will be wrapped in a soft bandage. You may need to wear a plaster splint for a short time to keep the finger or thumb still as it heals. The stitches will be removed in about 2 weeks. Your doctor can discuss the risks and benefits of surgery with you.  Date Last Reviewed: 9/21/2015 © 2000-2017 [employer]. [address] [place] [address]. All rights reserved. This information is not intended as a substitute for professional medical care. Always follow your healthcare professional's  instructions.        Osteoarthritis: Coping with Pain    There are many ways to control your pain. Youre making a good start by learning about osteoarthritis and its treatments. Knowing more about this condition helps you work with your healthcare provider to find answers to problems. Keeping a positive outlook can help you manage pain from day to day. And making time each day to relax and enjoy yourself may help you control osteoarthritis pain, instead of letting it control you. Try these methods to help you cope with, and even reduce, your pain.  Take control  Relaxing may help relieve muscle aches that result from joint pain. To relax, try these techniques:  · Breathe slowly and calmly and think of a peaceful scene.  · Meditate by focusing your mind on one word, object, or idea.  Getting plenty of sleep can help reduce pain and let you function better. If pain is making it hard for you to sleep, ask your doctor about ways to control pain and ensure a good nights sleep. Cutting back on caffeine and alcohol can help you sleep better. So can going to bed and getting up at about the same time every day.  Use distraction  Getting your mind off the pain may seem hard to do. But it can actually help reduce pain. When you are in pain, try one of these ways of distracting yourself:  · Watch a funny movie with a friend.  · Listen to music you enjoy.  · Read a novel.  · Talk with friends or family.  · Go to a museum, park, or other favorite attraction.  · Arrange to do a regular activity, such as volunteer work.  Heat and cold  Using heat and cold treatments are simple ways to lessen arthritis symptoms:  · Heat soothes stiff joints and tired muscles. Heat works well before exercise, for example. Heat treatments include:  ¨ A warm shower or baths, or soak (for example, fill the sink with warm water and move your fingers, hands, and wrists around in the water)  ¨ A moist heating pad  ¨ A warm, moist wash cloth  ¨ An  electric blanket or throw  · Cold treatments help to numb painful areas and decrease swelling. Cold treatments include the following wrapped in a thin towel:  ¨ An ice pack or bag of ice  ¨ A gel-filled cold pack  ¨ A bag of frozen vegetables, like peas or corn  Be careful when using heat or cold. You can injure your skin. Each treatment should only last for 10 to 20 minutes. Your healthcare provider or therapist can give you specific instructions.  Acupuncture  Acupuncture is a 2000-year-old practice. Practitioners insert thin needles in specific parts of the body. Research shows that it can help to relieve the pain of arthritis.  For more information or to find a practitioner in your area, contact the American Academy of Medical Acupuncture. Its website is: http://www.medicalacupuncture.org/.  Massage  Therapeutic massage has many benefits. It may:  · Help you and your muscles relax  · Improve blood flow to muscles and joints  · Help joints stay more flexible  Look for a certified massage therapist. Many are trained to treat sore muscles and joint pain and stiffness.  Vitamins, supplements, and herbs  People with arthritis, or other long-term conditions that cause pain, often look for alternative ways to lessen pain. Vitamins, supplements, and herbs may or may not help you to feel better. Before you try any vitamin, supplement, or herb, make sure you ask your healthcare provider or pharmacist.  Physical therapy/occupational therapy  Evaluation by a physical therapist and or occupational therapist for assessment for limitations in activities of daily living  Assistance with developing an appropriate exercise routine for both muscle strengthening and cardiovascular health  Weight management  Studies have demonstrated that weight loss in overweight individuals can improve osteoarthritis symptoms.  Talk with your healthcare provider regarding your optimal weight and techniques for weight management if  "necessary.  Psychological treatments  Research shows that many psychological therapies or those that deal with thinking and emotions, help people cope with arthritis pain. Therapies include cognitive-behavioral therapy (CBT), pain coping skills training, biofeedback, stress management, and hypnosis. Ask your healthcare provider for more information about these therapies.  For more information about many of these methods, contact the National Center for Complementary and Alternative Medicine (NCCAM) at http://www.[facility].gov.   Date Last Reviewed: 2/14/2016 © 2000-2017 [employer]. [address] [place] [address]. All rights reserved. This information is not intended as a substitute for professional medical care. Always follow your healthcare professional's instructions.        Osteoarthritis: Tips for Daily Living     Lift items with both hands.   Making a few changes in your daily life can reduce stress on your joints. This helps protect the joints from further damage.  Your surroundings  Make your home work for you:  · Arrange cupboards, closets, desks, and drawers to reduce reaching and bending.  · Arrange furniture to make it safer and easier to get around.  · Secure or remove rugs, power cords, and other items that might make you slip or trip.  Think ahead  Plan in advance:  · Combine errands so that you make fewer trips up and down stairs.  · Break up packages so that you carry less weight with each trip. For example, ask cashiers to use more bags for your groceries.  · If you need help with chores or errands, try to arrange for it in advance.  · If you need to lift something heavy, ask for help.  · Try to use other parts of your body if you have pain in certain joints.  Use whats available  To rest your hands, back, and neck:  · Make sure that knives are sharp.  · Use a "reacher" or grasper to reach and grab.  · Use soap-on-a-rope and a long-handled scrubber in the shower or " bath.  To rest your knees, hips, and lower back:  · Wear shoes that feel good, fit well, and provide good support.  · Choose chairs with firm seats and armrests.  Assistive devices  Devices are available to help you:  · In the kitchen, use light-weight dishes, cook and bakeware.  · Attach larger handles to keys.  · Use helpful gripping devices for opening jars.   · For gardening, use a rolling bench to sit on or to hold your tools. Use tools with padded handles.  · In the bathroom, try using grab bars, a raised toilet seat, or a shower seat.  · A cane, brace, or walker may help you walk more easily. Make sure that its properly fitted and that youre trained to use it.  Date Last Reviewed: 2/14/2016  © 5567-4309 [employer]. [address] [place] [address], [place] [address]. All rights reserved. This information is not intended as a substitute for professional medical care. Always follow your healthcare professional's instructions.

## 2019-09-16 ENCOUNTER — TELEPHONE (OUTPATIENT)
Dept: ORTHOPEDICS | Facility: CLINIC | Age: 49
End: 2019-09-16

## 2019-09-19 DIAGNOSIS — B00.9 HSV INFECTION: Primary | ICD-10-CM

## 2019-09-19 DIAGNOSIS — E11.42 DIABETIC POLYNEUROPATHY ASSOCIATED WITH TYPE 2 DIABETES MELLITUS: Chronic | ICD-10-CM

## 2019-09-20 RX ORDER — VALACYCLOVIR HYDROCHLORIDE 1 G/1
1000 TABLET, FILM COATED ORAL 2 TIMES DAILY
Qty: 20 TABLET | Refills: 0 | Status: SHIPPED | OUTPATIENT
Start: 2019-09-20 | End: 2019-10-04

## 2019-09-20 RX ORDER — LIDOCAINE 50 MG/G
OINTMENT TOPICAL
Qty: 35.44 G | Refills: 0 | Status: SHIPPED | OUTPATIENT
Start: 2019-09-20 | End: 2019-10-04

## 2019-09-23 RX ORDER — PEN NEEDLE, DIABETIC 30 GX3/16"
NEEDLE, DISPOSABLE MISCELLANEOUS
Qty: 200 EACH | Refills: 3 | Status: SHIPPED | OUTPATIENT
Start: 2019-09-23 | End: 2019-11-20 | Stop reason: SDUPTHER

## 2019-09-24 ENCOUNTER — TELEPHONE (OUTPATIENT)
Dept: ORTHOPEDICS | Facility: CLINIC | Age: 49
End: 2019-09-24

## 2019-09-24 NOTE — TELEPHONE ENCOUNTER
Spoke with the patient about their appointment on today at 4:00 with Dr. Roth. Called the patient to see if they where still coming or if they needed to reschedule their appointment. Patient states that they forgot about this appointment and would like to reschedule. I got the patient reschedule with Dr. Roth's next available appointment on 10/15/19 at 8:00. Patient verbalized understanding. Patient was thankful for the call. FP

## 2019-09-25 ENCOUNTER — TELEPHONE (OUTPATIENT)
Dept: INTERNAL MEDICINE | Facility: CLINIC | Age: 49
End: 2019-09-25

## 2019-09-25 NOTE — TELEPHONE ENCOUNTER
----- Message from Rashmi Albrecht sent at 9/25/2019 10:26 AM CDT -----  Contact: pt  Type:  Needs Medical Advice    Who Called: Patient  Symptoms (please be specific): Migraine headaches   How long has patient had these symptoms:  1wk  Pharmacy name and phone #:  [facility]latanya coreas  Would the patient rather a call back or a response via [facility]? Call back  Best Call Back Number: [phone]  Additional Information: pt would like something called into pharmacy

## 2019-09-25 NOTE — TELEPHONE ENCOUNTER
Pt will see Teresita Matthews on Friday at 8:40 ( Khushi Tamayo will override the schedule for me )

## 2019-09-27 ENCOUNTER — TELEPHONE (OUTPATIENT)
Dept: INTERNAL MEDICINE | Facility: CLINIC | Age: 49
End: 2019-09-27

## 2019-09-27 ENCOUNTER — OFFICE VISIT (OUTPATIENT)
Dept: INTERNAL MEDICINE | Facility: CLINIC | Age: 49
End: 2019-09-27
Payer: MEDICAID

## 2019-09-27 ENCOUNTER — TELEPHONE (OUTPATIENT)
Dept: OBSTETRICS AND GYNECOLOGY | Facility: CLINIC | Age: 49
End: 2019-09-27

## 2019-09-27 VITALS
TEMPERATURE: 99 F | DIASTOLIC BLOOD PRESSURE: 82 MMHG | SYSTOLIC BLOOD PRESSURE: 128 MMHG | WEIGHT: 205.94 LBS | HEART RATE: 96 BPM | HEIGHT: 56 IN | OXYGEN SATURATION: 96 % | BODY MASS INDEX: 46.33 KG/M2

## 2019-09-27 DIAGNOSIS — G43.119 INTRACTABLE MIGRAINE WITH AURA WITHOUT STATUS MIGRAINOSUS: Primary | ICD-10-CM

## 2019-09-27 DIAGNOSIS — E66.01 MORBID OBESITY WITH BMI OF 40.0-44.9, ADULT: ICD-10-CM

## 2019-09-27 DIAGNOSIS — I15.2 HYPERTENSION COMPLICATING DIABETES: Chronic | ICD-10-CM

## 2019-09-27 DIAGNOSIS — E11.65 TYPE 2 DIABETES MELLITUS WITH HYPERGLYCEMIA, WITHOUT LONG-TERM CURRENT USE OF INSULIN: Chronic | ICD-10-CM

## 2019-09-27 DIAGNOSIS — R11.2 NAUSEA AND VOMITING, INTRACTABILITY OF VOMITING NOT SPECIFIED, UNSPECIFIED VOMITING TYPE: ICD-10-CM

## 2019-09-27 DIAGNOSIS — I10 ESSENTIAL HYPERTENSION: Chronic | ICD-10-CM

## 2019-09-27 DIAGNOSIS — E11.59 HYPERTENSION COMPLICATING DIABETES: Chronic | ICD-10-CM

## 2019-09-27 DIAGNOSIS — L02.212 BACK ABSCESS: ICD-10-CM

## 2019-09-27 PROCEDURE — 99999 PR PBB SHADOW E&M-EST. PATIENT-LVL V: ICD-10-PCS | Mod: PBBFAC,,, | Performed by: NURSE PRACTITIONER

## 2019-09-27 PROCEDURE — 96372 THER/PROPH/DIAG INJ SC/IM: CPT | Mod: PBBFAC

## 2019-09-27 PROCEDURE — 99214 OFFICE O/P EST MOD 30 MIN: CPT | Mod: S$PBB,,, | Performed by: NURSE PRACTITIONER

## 2019-09-27 PROCEDURE — 99215 OFFICE O/P EST HI 40 MIN: CPT | Mod: PBBFAC | Performed by: NURSE PRACTITIONER

## 2019-09-27 PROCEDURE — 99214 PR OFFICE/OUTPT VISIT, EST, LEVL IV, 30-39 MIN: ICD-10-PCS | Mod: S$PBB,,, | Performed by: NURSE PRACTITIONER

## 2019-09-27 PROCEDURE — 99999 PR PBB SHADOW E&M-EST. PATIENT-LVL V: CPT | Mod: PBBFAC,,, | Performed by: NURSE PRACTITIONER

## 2019-09-27 RX ORDER — SULFAMETHOXAZOLE AND TRIMETHOPRIM 800; 160 MG/1; MG/1
1 TABLET ORAL 2 TIMES DAILY
Qty: 20 TABLET | Refills: 0 | Status: SHIPPED | OUTPATIENT
Start: 2019-09-27 | End: 2019-10-07

## 2019-09-27 RX ORDER — KETOROLAC TROMETHAMINE 30 MG/ML
30 INJECTION, SOLUTION INTRAMUSCULAR; INTRAVENOUS
Status: COMPLETED | OUTPATIENT
Start: 2019-09-27 | End: 2019-09-27

## 2019-09-27 RX ORDER — PROMETHAZINE HYDROCHLORIDE 25 MG/1
25 TABLET ORAL EVERY 8 HOURS PRN
Qty: 14 TABLET | Refills: 0 | Status: SHIPPED | OUTPATIENT
Start: 2019-09-27 | End: 2019-12-28 | Stop reason: SDUPTHER

## 2019-09-27 RX ORDER — MUPIROCIN 20 MG/G
OINTMENT TOPICAL 2 TIMES DAILY
Qty: 1 TUBE | Refills: 0 | Status: SHIPPED | OUTPATIENT
Start: 2019-09-27 | End: 2019-10-07

## 2019-09-27 RX ORDER — FROVATRIPTAN SUCCINATE 2.5 MG/1
TABLET, FILM COATED ORAL
Qty: 9 TABLET | Refills: 1 | Status: SHIPPED | OUTPATIENT
Start: 2019-09-27 | End: 2019-11-20 | Stop reason: SDUPTHER

## 2019-09-27 RX ADMIN — KETOROLAC TROMETHAMINE 30 MG: 30 INJECTION, SOLUTION INTRAMUSCULAR at 09:09

## 2019-09-27 NOTE — TELEPHONE ENCOUNTER
----- Message from Josselyn Stringer sent at 9/27/2019  9:47 AM CDT -----  Contact: Patient  Patient requesting a well women appt, please call her back at [phone]. Thank you

## 2019-09-27 NOTE — TELEPHONE ENCOUNTER
Spoke with patient and informed her that Dr. Beach is out of the clinic this week and will not be back until Tuesday of next week. I asked her to call back then to see if we can work her in for an appointment. She did not want to see another provider. Patient verbalized understanding.

## 2019-09-27 NOTE — PATIENT INSTRUCTIONS
"  Self-Care for Headaches  Most headaches aren't serious and can be relieved with self-care. But some headaches may be a sign of another health problem like eye trouble or high blood pressure. To find the best treatment, learn what kind of headaches you get. For tension headaches, self-care will usually help. To treat migraines, ask your healthcare provider for advice. It is also possible to get both tension and migraine headaches. Self-care involves relieving the pain and avoiding headache triggers if you can.    Ways to reduce pain and tension  Try these steps:  · Apply a cold compress or ice pack to the pain site.  · Drink fluids. If nausea makes it hard to drink, try sucking on ice.  · Rest. Protect yourself from bright light and loud noises.  · Calm your emotions by imagining a peaceful scene.  · Massage tight neck, shoulder, and head muscles.  · To relax muscles, soak in a hot bath or use a hot shower.  Use medicines  Aspirin or aspirin substitutes, such as ibuprofen and acetaminophen, can relieve headache. Remember: Never give aspirin to anyone 18 years old or younger because of the risk of developing Reye syndrome. Use pain medicines only when necessary.  Track your headaches  Keeping a headache diary can help you and your healthcare provider identify what's causing your headaches:  · Note when each headache happens.  · Identify your activities and the foods you've eaten 6 to 8 hours before the headache began.  · Look for any trends or "triggers."  Signs of tension headache  Any of the following can be signs:  · Dull pain or feeling of pressure in a tight band around your head  · Pain in your neck or shoulders  · Headache without a definite beginning or end  · Headache after an activity such as driving or working on a computer  Signs of migraine  Any of the following can be signs:  · Throbbing pain on one or both sides of your head  · Nausea or vomiting  · Extreme sensitivity to light, sound, and " "smells  · Bright spots, flashes, or other visual changes  · Pain or nausea so severe that you can't continue your daily activities  Call your healthcare provider   If you have any of the following symptoms, contact your healthcare provider:  · A headache that lingers after a recent injury or bump to the head.  · A fever with a stiff neck or pain when you bend your head toward your chest.  · A headache along with slurred speech, changes in your vision, or numbness or weakness in your arms or legs.  · A headache for longer than 3 days.  · Frequent headaches, especially in the morning.  · Headaches with seizures   · Seek immediate medical attention if you have a headache that you would call "the worst headache you have ever had."   Date Last Reviewed: 10/4/2015  © 2547-3261 [employer]. [address] [place] [address], [place] [address]. All rights reserved. This information is not intended as a substitute for professional medical care. Always follow your healthcare professional's instructions.        Migraine Headache  This often severe type of headache is different from other types of headaches in that symptoms other than pain occur with the headache. Nausea and vomiting, lightheadedness, sensitivity to light (photophobia), and other visual disturbances are common migraine symptoms. The pain may last from a few hours to several days. It is not clear why migraines occur but certain factors called triggers can raise the risk of having a migraine attack. A migraine may be triggered by emotional stress or depression, or by hormone changes during the menstrual cycle. Other triggers include birth control pills, overuse of migraine medicines, alcohol or caffeine, foods with tyramine (such as aged cheese and wine), eyestrain, weather changes, missed meals, or too little or too much sleep.  Home care  Follow these tips when taking care of yourself at home:  · Dont drive yourself home if you were given pain medicine " for your headache or are having visual symptoms. Instead, have someone else drive you home. Try to sleep when you get home. You should feel much better when you wake up.  · Cold can help ease migraine symptoms. Put an ice pack on your forehead or at the base of your skull. Put heat on the back of your neck to help ease any neck spasm.  · Drink only clear liquids or eat a light diet until your symptoms get better. This will help you avoid nausea and vomiting.  How to prevent migraines  Pay attention to what seems to trigger your headache. Try to avoid the triggers when you can. If you have frequent headaches, consider keeping a headache diary. In it, write down what you were doing, feeling, or eating in the hours before each headache. Show this to your healthcare provider to help find the cause of your headaches.  If stress seems to be a trigger for your headaches, figure out what is causing stress in your life. Learn new ways to handle your stress. Ideas include regular exercise, biofeedback, self-hypnosis, yoga, and meditation. Talk with your healthcare provider to find out more information about managing stress. Many books and digital media are also available on this subject.  Tyramine is a substance found in many foods. It can trigger a migraine in some people. These foods contain tyramine:  · Chocolate  · Yogurt  · All cheeses, but especially aged cheeses  · Smoked or pickled fish and meat, including herring, caviar, bologna, pepperoni, and salami  · Liver  · Avocados  · Bananas  · Figs  · Raisins  · Red wine  Try staying away from these foods for 1 to 2 months to see if you have fewer headaches.  How to treat future headaches  · Take time out at the first sign of a headache, if possible. Find a quiet, dark, comfortable place to sit or lie down. Let yourself relax or sleep.  · Put an ice pack on your forehead or on the area of greatest pain. A heating pad and massage may help if you are having a muscle spasm and  tightness in your neck.  · If you have been prescribed a medicine to stop a migraine headache, use this at the first warning sign of the headache for best results. First signs may be an aura or pain.  · If you need to take medicine often for your migraine, talk with your healthcare provider about other ways to prevent your headaches.  Follow-up care  Follow up with your healthcare provider, or as advised. Talk with your provider if you have frequent headaches. He or she can figure out a treatment plan. Ask if you can have medicine to take at home the next time you get a bad headache. This may keep you from having to visit the emergency department in the future. You may need to see a headache specialist (neurologist) if you continue to have headaches.  When to seek medical advice  Call your healthcare provider right away if any of these occur:  · Your head pain gets worse, or doesnt get better within 24 hours  · You cant keep liquids down (repeated vomiting)  · Pain in your sinuses, ears, or throat  · Fever of 100.4º F (38º C) or higher, or as directed by your healthcare provider  · Stiff neck  · Extreme drowsiness, confusion, or fainting  · Dizziness, or dizziness with spinning sensation (vertigo)  · Weakness in an arm or leg, or on one side of your face  · Difficulty talking or seeing  Date Last Reviewed: 8/1/2016  © 1966-8657 [employer]. [address] [place] [address], [place] [address]. All rights reserved. This information is not intended as a substitute for professional medical care. Always follow your healthcare professional's instructions.        Abscess (Antibiotic Treatment Only)  An abscess (sometimes called a boil) happens when bacteria get trapped under the skin and start to grow. Pus forms inside the abscess as the body responds to the bacteria. An abscess can happen with an insect bite, ingrown hair, blocked oil gland, pimple, cyst, or puncture wound.  In the early stages, your wound may be  red and tender. For this stage, you may get antibiotics. If the abscess does not get better with antibiotics, it will need to be drained with a small cut.  Home care  These tips will help you care for your abscess at home:  · Soak the wound in hot water or apply hot packs (small towel soaked in hot water) to the area for 20 minutes at a time. Do this 3 to 4 times a day.  · Do not cut, squeeze, or pop the boil yourself.  · Apply antibiotic cream or ointment to the skin 3 to 4 times a day, unless something else was prescribed. Some ointments include an antibiotic plus a pain reliever.  · If your doctor prescribed antibiotics, do not stop taking them until you have finished the medicine or the doctor tells you to stop.  · You may use an over-the-counter pain medicine to control pain, unless another pain medicine was prescribed. If you have chronic liver or kidney disease or ever had a stomach ulcer or gastrointestinal bleeding, talk with your doctor before using these any of these.  Follow-up care  Follow up with your healthcare provider, or as advised. Check your wound each day for the signs of worsening infection listed below.  When to seek medical advice  Get prompt medical attention if any of these occur:  · An increase in redness or swelling  · Red streaks in the skin leading away from the abscess  · An increase in local pain or swelling  · Fever of 100.4ºF (38ºC) or higher, or as directed by your healthcare provider  · Pus or fluid coming from the abscess  · Boil returns after getting better  Date Last Reviewed: 9/1/2016  © 1750-8031 [employer]. [address] [place] [address], [place] [address]. All rights reserved. This information is not intended as a substitute for professional medical care. Always follow your healthcare professional's instructions.

## 2019-09-27 NOTE — PROGRESS NOTES
Subjective:       Patient ID: Yolanda Betts is a 49 y.o. female.    Chief Complaint: Follow-up    Migraine    This is a new problem. The current episode started 1 to 4 weeks ago. The problem occurs daily. The problem has been waxing and waning. The pain is located in the bilateral region. The pain does not radiate. The pain quality is similar to prior headaches. The quality of the pain is described as throbbing and aching. The pain is at a severity of 8/10. The pain is moderate. Associated symptoms include nausea, phonophobia and photophobia. Pertinent negatives include no abdominal pain, abnormal behavior, anorexia, back pain, blurred vision, coughing, dizziness, drainage, ear pain, eye pain, eye redness, eye watering, facial sweating, fever, hearing loss, insomnia, loss of balance, muscle aches, neck pain, numbness, rhinorrhea, scalp tenderness, seizures, sinus pressure, sore throat, swollen glands, tingling, tinnitus, visual change, vomiting, weakness or weight loss. The symptoms are aggravated by bright light, emotional stress and noise. She has tried nothing for the symptoms. Her past medical history is significant for hypertension, migraine headaches and obesity.   Abscess   Chronicity:  NewProgression Since Onset: worsening  Location:  [place]  Associated Symptoms: no fever, no chills, no sweats  Characteristics: painful    Characteristics: not draining, no itching, no redness, no dryness, no scaling, no peeling, no swelling, no bruising and no blistering    Pain Scale:  7/10  Treatments Tried:  Nothing  Relieved by:  Nothing  Worsened by:  None tried          Past Medical History:   Diagnosis Date    Abnormal Pap smear of cervix     HPV genital warts    Anemia     Anxiety     Arthritis     Asthma 10/11/2016    Bipolar 1 disorder     Diabetes mellitus, type 2     Dyslipidemia associated with type 2 diabetes mellitus 5/13/2019    Genital warts     GERD (gastroesophageal reflux disease)      Hypertension     Hypertension complicating diabetes 2019    Mild persistent asthma without complication 10/11/2016    Morbid obesity with body mass index (BMI) of 45.0 to 49.9 in adult 8/3/2017    Obstructive sleep apnea     Schizoaffective disorder, bipolar type 2019    Seasonal allergic rhinitis due to pollen 2019    Type 2 diabetes mellitus with hyperglycemia, without long-term current use of insulin 2017     Past Surgical History:   Procedure Laterality Date    BELT ABDOMINOPLASTY  1996    BREAST SURGERY Bilateral 1996    Reduction     SECTION      X 2    COLONOSCOPY      COLONOSCOPY N/A 2018    Procedure: colonoscopy for iron deficiency anemia;  Surgeon: Frankie Sanchez MD;  Location: [facility];  Service: Endoscopy;  Laterality: N/A;    COLONOSCOPY N/A 2018    Procedure: COLONOSCOPY;  Surgeon: Frankie Sanchez MD;  Location: [facility];  Service: Endoscopy;  Laterality: N/A;    HYSTERECTOMY      w/ BSO; hypermenorrhea    MOUTH SURGERY      OOPHORECTOMY      hyst/bso, hypermenorrhea    TUBAL LIGATION       Past Surgical History:   Procedure Laterality Date    BELT ABDOMINOPLASTY  1996    BREAST SURGERY Bilateral 1996    Reduction     SECTION      X 2    COLONOSCOPY      COLONOSCOPY N/A 2018    Procedure: colonoscopy for iron deficiency anemia;  Surgeon: Frankie Sanchez MD;  Location: [facility];  Service: Endoscopy;  Laterality: N/A;    COLONOSCOPY N/A 2018    Procedure: COLONOSCOPY;  Surgeon: Frankie Sanchez MD;  Location: [facility];  Service: Endoscopy;  Laterality: N/A;    HYSTERECTOMY      w/ BSO; hypermenorrhea    MOUTH SURGERY      OOPHORECTOMY      hyst/bso, hypermenorrhea    TUBAL LIGATION       Social History     Socioeconomic History    Marital status: Single     Spouse name: Not on file    Number of children: Not on file    Years of education: Not on file    Highest education level: Not on  "file   Occupational History    Not on file   Social Needs    Financial resource strain: Not on file    Food insecurity:     Worry: Not on file     Inability: Not on file    Transportation needs:     Medical: Not on file     Non-medical: Not on file   Tobacco Use    Smoking status: Never Smoker    Smokeless tobacco: Never Used   Substance and Sexual Activity    Alcohol use: Yes     Alcohol/week: 0.0 standard drinks     Comment: socially    Drug use: No    Sexual activity: Not Currently     Partners: Male   Lifestyle    Physical activity:     Days per week: Not on file     Minutes per session: Not on file    Stress: Not on file   Relationships    Social connections:     Talks on phone: Not on file     Gets together: Not on file     Attends Worship service: Not on file     Active member of club or organization: Not on file     Attends meetings of clubs or organizations: Not on file     Relationship status: Not on file   Other Topics Concern    Not on file   Social History Narrative    Long-term care nurse     Review of patient's allergies indicates:   Allergen Reactions    Codeine Itching    Lortab [hydrocodone-acetaminophen] Itching    Neuromuscular blockers, steroidal      some    Latex, natural rubber Rash    Morphine Rash     itching    Seconal [secobarbital sodium] Rash     itching    Tylox [oxycodone-acetaminophen] Rash     Current Outpatient Medications   Medication Sig    albuterol (PROVENTIL/VENTOLIN HFA) 90 mcg/actuation inhaler Inhale 2 puffs into the lungs every 4 (four) hours as needed.    ALPRAZolam (XANAX) 2 MG Tab Take 2 mg by mouth 2 (two) times daily.     aspirin (ECOTRIN) 81 MG EC tablet Take 81 mg by mouth once daily.    atorvastatin (LIPITOR) 20 MG tablet Take 1 tablet (20 mg total) by mouth every evening.    BD INSULIN SYRINGE ULTRA-FINE 1/2 mL 30 gauge x 1/2" Syrg     benztropine (COGENTIN) 1 MG tablet Take 1 mg by mouth nightly.    blood sugar diagnostic Strp Check " blood glucose 3 times daily as directed and as needed (dispense insurance preferred brand or patient choice)    blood-glucose meter Misc use 3 times daily as directed    cycloSPORINE (RESTASIS) 0.05 % ophthalmic emulsion Place 0.4 mLs (1 drop total) into both eyes 2 (two) times daily.    diclofenac sodium (VOLTAREN) 1 % Gel Apply 2 g topically 4 (four) times daily.    duloxetine (CYMBALTA) 60 MG capsule Take 60 mg by mouth 2 (two) times daily.    empagliflozin (JARDIANCE) 10 mg Tab Take 1 tablet (10 mg) by mouth every morning.    ergocalciferol (ERGOCALCIFEROL) 50,000 unit Cap Take 1 capsule (50,000 Units total) by mouth every 14 (fourteen) days.    fish oil-omega-3 fatty acids 300-1,000 mg capsule Take 1 capsule by mouth once daily.    fluticasone propionate (FLONASE) 50 mcg/actuation nasal spray 2 sprays (100 mcg total) by Each Nare route once daily.    gabapentin (NEURONTIN) 300 MG capsule Take 2 capsules (600 mg total) by mouth 3 (three) times daily.    insulin aspart U-100 (NOVOLOG FLEXPEN U-100 INSULIN) 100 unit/mL (3 mL) InPn pen Inject before meals TID AC up to 30 units daily    insulin detemir U-100 (LEVEMIR FLEXTOUCH) 100 unit/mL (3 mL) SubQ InPn pen Inject 76 Units into the skin every evening.    lancets 28 gauge Misc use as directed 3 times daily    LATUDA 120 mg Tab     lidocaine (XYLOCAINE) 5 % Oint ointment Apply topically as needed.    LINZESS 145 mcg Cap capsule Take 145 mcg by mouth as needed.     liraglutide 0.6 mg/0.1 mL, 18 mg/3 mL, subq PNIJ (VICTOZA 2-MILENA) 0.6 mg/0.1 mL (18 mg/3 mL) PnIj Use 1.8mg under the skin daily    metFORMIN (GLUCOPHAGE) 1000 MG tablet TAKE 1 TABLET (1,000 MG TOTAL) BY MOUTH 2 (TWO) TIMES DAILY.    metoprolol succinate (TOPROL-XL) 100 MG 24 hr tablet Take 1 tablet (100 mg total) by mouth once daily.    mirtazapine (REMERON) 45 MG tablet Take 45 mg by mouth every evening.    mometasone (ASMANEX TWISTHALER) 220 mcg (120 doses) AePB Inhale 2 puffs into  "the lungs 2 (two) times daily. Controller    montelukast (SINGULAIR) 10 mg tablet Take 1 tablet (10 mg total) by mouth nightly.    MULTIVIT,CALC,MINS/IRON/FOLIC (DAILY MULTIPLE FOR WOMEN ORAL) Take 1 tablet by mouth once daily.    nitroGLYCERIN (NITROSTAT) 0.4 MG SL tablet Dissolve 1 tablet under the tongue every 5 minutes as needed, up to 3 times. If chest pain persists, call 911.    nystatin-triamcinolone (MYCOLOG II) cream Apply to affected area 2 times daily    pen needle, diabetic (BD ULTRA-FINE MINI PEN NEEDLE) 31 gauge x 3/16" Ndle Use 5 a day    promethazine (PHENERGAN) 25 MG tablet Take 1 tablet (25 mg total) by mouth every 8 (eight) hours as needed for Nausea. sedating    valACYclovir (VALTREX) 1000 MG tablet Take 1 tablet (1,000 mg total) by mouth 2 (two) times daily. for 10 days    valsartan-hydrochlorothiazide (DIOVAN-HCT) 320-25 mg per tablet Take 1 tablet by mouth once daily.    zolpidem (AMBIEN) 10 mg Tab Take 10 mg by mouth nightly as needed.    frovatriptan (FROVA) 2.5 MG tablet Take one tablet orally. If recurs, may repeat after 2 hours. Max of 3 tabs in 24 hours.    mupirocin (BACTROBAN) 2 % ointment Apply topically 2 (two) times daily. To affected area of back for 10 days    sulfamethoxazole-trimethoprim 800-160mg (BACTRIM DS) 800-160 mg Tab Take 1 tablet by mouth 2 (two) times daily. for 10 days     No current facility-administered medications for this visit.            Review of Systems   Constitutional: Negative for activity change, appetite change, chills, diaphoresis, fatigue, fever, unexpected weight change and weight loss.   HENT: Negative for congestion, ear pain, hearing loss, postnasal drip, rhinorrhea, sinus pressure, sinus pain, sneezing, sore throat, tinnitus, trouble swallowing and voice change.    Eyes: Positive for photophobia. Negative for blurred vision, pain, redness and visual disturbance.   Respiratory: Negative for cough, chest tightness, shortness of breath and " wheezing.    Cardiovascular: Negative for chest pain, palpitations and leg swelling.   Gastrointestinal: Positive for nausea. Negative for abdominal distention, abdominal pain, anorexia, constipation, diarrhea and vomiting.   Genitourinary: Negative for decreased urine volume, difficulty urinating, dysuria, flank pain, frequency, hematuria and urgency.   Musculoskeletal: Negative for arthralgias, back pain, joint swelling, neck pain and neck stiffness.   Allergic/Immunologic: Negative for immunocompromised state.   Neurological: Positive for headaches. Negative for dizziness, tingling, tremors, seizures, syncope, facial asymmetry, speech difficulty, weakness, light-headedness, numbness and loss of balance.   Hematological: Negative for adenopathy. Does not bruise/bleed easily.   Psychiatric/Behavioral: Negative for confusion and sleep disturbance. The patient does not have insomnia.        Objective:      Physical Exam   Constitutional: She is oriented to person, place, and time.   Eyes: Pupils are equal, round, and reactive to light. Conjunctivae and EOM are normal.   Cardiovascular: Normal rate, regular rhythm and normal heart sounds.   Pulmonary/Chest: Effort normal and breath sounds normal.   Musculoskeletal: Normal range of motion.   Neurological: She is alert and oriented to person, place, and time. She displays normal reflexes. No cranial nerve deficit or sensory deficit. She exhibits normal muscle tone. Coordination normal.   Skin:            Assessment:     Vitals:    09/27/19 0856   BP: 128/82   Pulse: 96   Temp: 98.8 °F (37.1 °C)         1. Intractable migraine with aura without status migrainosus    2. Nausea and vomiting, intractability of vomiting not specified, unspecified vomiting type    3. Back abscess    4. Essential hypertension    5. Hypertension complicating diabetes    6. Type 2 diabetes mellitus with hyperglycemia, without long-term current use of insulin    7. Morbid obesity with BMI of  40.0-44.9, adult        Plan:   Intractable migraine with aura without status migrainosus  -     frovatriptan (FROVA) 2.5 MG tablet; Take one tablet orally. If recurs, may repeat after 2 hours. Max of 3 tabs in 24 hours.  Dispense: 9 tablet; Refill: 1  -     ketorolac injection 30 mg    Nausea and vomiting, intractability of vomiting not specified, unspecified vomiting type  -     promethazine (PHENERGAN) 25 MG tablet; Take 1 tablet (25 mg total) by mouth every 8 (eight) hours as needed for Nausea. sedating  Dispense: 14 tablet; Refill: 0    Back abscess  Comments:  right mid back   Orders:  -     sulfamethoxazole-trimethoprim 800-160mg (BACTRIM DS) 800-160 mg Tab; Take 1 tablet by mouth 2 (two) times daily. for 10 days  Dispense: 20 tablet; Refill: 0  -     mupirocin (BACTROBAN) 2 % ointment; Apply topically 2 (two) times daily. To affected area of back for 10 days  Dispense: 1 Tube; Refill: 0    Essential hypertension    Hypertension complicating diabetes    Type 2 diabetes mellitus with hyperglycemia, without long-term current use of insulin    Morbid obesity with BMI of 40.0-44.9, adult        As above  AVS given and discussed in detail  Monitor BP closely on triptan  Phenergan drowsiness and sedation precautions discussed  F/u with PCP

## 2019-09-27 NOTE — TELEPHONE ENCOUNTER
Returned call to patient.  She requested a wwe at [facility].  Appt 10/30/19 at 8:45 am, she confirmed appt and location.

## 2019-09-27 NOTE — TELEPHONE ENCOUNTER
----- Message from Josselyn Stringer sent at 9/27/2019  9:48 AM CDT -----  Contact: Patient  Patient requesting a follow up appt, but there are no openings until after December. Please call her back at [phone]. Thank you

## 2019-09-28 ENCOUNTER — NURSE TRIAGE (OUTPATIENT)
Dept: ADMINISTRATIVE | Facility: CLINIC | Age: 49
End: 2019-09-28

## 2019-09-29 NOTE — TELEPHONE ENCOUNTER
Reason for Disposition   Sounds like a life-threatening emergency to the triager    Additional Information   Negative: [1] Chest pain AND [2] took nitrogylcerin AND [3] pain was not relieved   Negative: [1] Chest pain lasts > 5 minutes AND [2] history of heart disease  (i.e., heart attack, bypass surgery, angina, angioplasty, CHF)   Negative: [1] Numbness (i.e., loss of sensation) of the face, arm or leg on one side of the body AND [2] new onset   Negative: [1] Weakness of the face, arm or leg on one side of the body AND [2] new onset   Negative: Severe difficulty breathing (e.g., struggling for each breath, speaks in single words)   Negative: Difficult to awaken or acting confused (e.g., disoriented, slurred speech)    Protocols used: HIGH BLOOD PRESSURE-A-  /105 HR= ? Pt denies sx. Pt denies ride to ED. rec EMS. Pt agrees. Call back with questions

## 2019-09-30 DIAGNOSIS — E11.42 DIABETIC POLYNEUROPATHY ASSOCIATED WITH TYPE 2 DIABETES MELLITUS: Chronic | ICD-10-CM

## 2019-10-02 ENCOUNTER — PATIENT OUTREACH (OUTPATIENT)
Dept: OTHER | Facility: OTHER | Age: 49
End: 2019-10-02

## 2019-10-02 ENCOUNTER — PATIENT MESSAGE (OUTPATIENT)
Dept: PHARMACY | Facility: CLINIC | Age: 49
End: 2019-10-02

## 2019-10-02 ENCOUNTER — TELEPHONE (OUTPATIENT)
Dept: INTERNAL MEDICINE | Facility: CLINIC | Age: 49
End: 2019-10-02

## 2019-10-02 DIAGNOSIS — I10 ESSENTIAL HYPERTENSION: Primary | ICD-10-CM

## 2019-10-02 RX ORDER — CLONIDINE HYDROCHLORIDE 0.1 MG/1
0.1 TABLET ORAL EVERY 6 HOURS PRN
Qty: 60 TABLET | Refills: 1 | Status: SHIPPED | OUTPATIENT
Start: 2019-10-02 | End: 2019-12-30

## 2019-10-02 RX ORDER — AMLODIPINE BESYLATE 10 MG/1
10 TABLET ORAL DAILY
Qty: 90 TABLET | Refills: 1 | Status: SHIPPED | OUTPATIENT
Start: 2019-10-02 | End: 2019-10-04

## 2019-10-02 NOTE — TELEPHONE ENCOUNTER
S/w pt stating via Dig HTN machine her BP readings have been increased including dystolic 100-110s, and no response received from pt's [employer] message sent to [facility] HTN program. Pt denies CP or SOB, but pt c/o HA. Advised pt would let program know, and to f/u with pt. Pt voiced understanding.

## 2019-10-02 NOTE — TELEPHONE ENCOUNTER
----- Message from Cecilia Mejia MA sent at 10/2/2019  2:05 PM CDT -----  Contact: pt      ----- Message -----  From: Charline Vazquez  Sent: 10/2/2019   1:27 PM CDT  To: [facility] Staff    She's callingin regards to High blood pressure  ,      pls call pt back at [phone] (home)

## 2019-10-02 NOTE — PROGRESS NOTES
"Digital Medicine: Clinician Follow-Up    The history is provided by the patient.     Follow Up  Follow-up reason(s): reading review and medication change      Alert received. Care Team received high BP alert.    Medication Change: new med  Yolanda submitted a high alert with no repeat readings. Upon talking to patient, she reports HA, blurry vision x 2 weeks. Says some symptoms could be from migraines, as she feels an "aura coming on".  Taking Aleve. Discussed Aleve's effect on BP.     Patient reports she has taken more readings since her high alert on 9/28/19, however, these readings have not come over. Task placed for tech support assistance.               Sleep Apnea  Patient previously diagnosed with ALEN She reports she is not currently using CPAP.     Medication Affordability  Patient is currently not having problems affording medications      INTERVENTION(S)  reviewed appropriate dose schedule, recommended diet modifications, recommended physical activity, reviewed monitoring technique, recommended med change and encouragement/support    PLAN  patient verbalizes understanding, patient amenable to changes, additional monitoring needed and Health  follow up    BMP ordered - no labs in nearly 2 years. On HCTZ. Checking renal fxn and lytes before considering increase in dose vs interchange to chlorthalidone.     Initiate amlodipine 5 mg QD x 1 week, increase to 10 mg QD as tolerated.   Initiate clonidine 0.1 mg PRN for SBP>150 and/or SBP>100 mmHg.     Will continue to monitor patient all will contact with lab results for further changes.       There are no preventive care reminders to display for this patient.    Last 5 Patient Entered Readings                                      Current 30 Day Average: 158/102     Recent Readings 9/28/2019 9/15/2019 9/10/2019 9/9/2019 9/8/2019    SBP (mmHg) 180 147 147 148 155    DBP (mmHg) 105 93 94 100 109    Pulse 95 88 94 92 94             Hypertension Medications          "    metoprolol succinate (TOPROL-XL) 100 MG 24 hr tablet Take 1 tablet (100 mg total) by mouth once daily.    nitroGLYCERIN (NITROSTAT) 0.4 MG SL tablet Dissolve 1 tablet under the tongue every 5 minutes as needed, up to 3 times. If chest pain persists, call 911.    valsartan-hydrochlorothiazide (DIOVAN-HCT) 320-25 mg per tablet Take 1 tablet by mouth once daily.

## 2019-10-03 ENCOUNTER — LAB VISIT (OUTPATIENT)
Dept: LAB | Facility: HOSPITAL | Age: 49
End: 2019-10-03
Attending: FAMILY MEDICINE
Payer: MEDICAID

## 2019-10-03 ENCOUNTER — PATIENT OUTREACH (OUTPATIENT)
Dept: OTHER | Facility: OTHER | Age: 49
End: 2019-10-03

## 2019-10-03 DIAGNOSIS — I10 ESSENTIAL HYPERTENSION: ICD-10-CM

## 2019-10-03 PROCEDURE — 36415 COLL VENOUS BLD VENIPUNCTURE: CPT

## 2019-10-03 PROCEDURE — 80048 BASIC METABOLIC PNL TOTAL CA: CPT

## 2019-10-03 NOTE — PROGRESS NOTES
Patient had called back after having labs drawn and she had a few questions about the way she was feeling. Patient stated that her muscles just feel weak and she feels like they could give out at any moment. Notified Loretta Carmichael who stated she would call her back and talk toher about what was going on. Will f/u at next scheduled outreach of 10/24/19.

## 2019-10-03 NOTE — PROGRESS NOTES
Digital Medicine: Health  Follow-Up    HPI    Assessment/Plan    SDOH    Intervention/Plan    There are no preventive care reminders to display for this patient.    Last 5 Patient Entered Readings                                      Current 30 Day Average: 162/107     Recent Readings 10/3/2019 10/3/2019 10/2/2019 10/2/2019 10/2/2019    SBP (mmHg) 149 155 172 176 188    DBP (mmHg) 101 107 116 117 112    Pulse 117 119 121 106 112

## 2019-10-03 NOTE — PROGRESS NOTES
Encounter with patient was brief because she was on her way to get labs drawn. Patient has been having elevated readings. Patient states that she has been having constant headaches, experiencing dizziness, and nauseau. Loretta Carmichael changed medication yesterday and so patient has picked up new prescription. Patient is concerned about BP and let her know that I would f/u with Loretta Carmichael and that we would be monitoring her closely. Will f/u in 3-4 weeks.

## 2019-10-03 NOTE — PROGRESS NOTES
"Patient called health , [facility], stating she doesn't feel well. Called patient to follow up on this report.     Yolanda states she picked up her amlodipine and clonidine yesterday. She began her amlodipine last night and took a clonidine tablet this morning for SBP>150 mmHg.     She called HC this morning stating she didn't fee like the clonidine did anything. HC suggested she repeat her BP once back home to verify. SBP was reduced from 155 to 141 mmHg. SBP has been in the 170's-180's lately.     Patient now experiencing dizziness, some nausea, and "borderline lightheadedness."     Discussed effects of BP lowering, and that patient is expected to have some symptoms as BP regulates to new normal. Advised patient to ensure adequate hydration, be mindful and cautious when changing positions, and to notify me if episodes do not self resolve within a few minutes.     Patient went for [facility] today - awaiting results. Will call patient once results received and will continue ongoing monitoring.     "

## 2019-10-04 ENCOUNTER — PATIENT OUTREACH (OUTPATIENT)
Dept: OTHER | Facility: OTHER | Age: 49
End: 2019-10-04

## 2019-10-04 ENCOUNTER — HOSPITAL ENCOUNTER (EMERGENCY)
Facility: HOSPITAL | Age: 49
Discharge: HOME | End: 2019-10-04
Attending: EMERGENCY MEDICINE
Payer: MEDICAID

## 2019-10-04 VITALS
SYSTOLIC BLOOD PRESSURE: 146 MMHG | HEIGHT: 56 IN | WEIGHT: 206.56 LBS | TEMPERATURE: 98 F | HEART RATE: 91 BPM | DIASTOLIC BLOOD PRESSURE: 87 MMHG | BODY MASS INDEX: 46.46 KG/M2 | OXYGEN SATURATION: 98 % | RESPIRATION RATE: 18 BRPM

## 2019-10-04 DIAGNOSIS — L08.9 INFECTED SEBACEOUS CYST: ICD-10-CM

## 2019-10-04 DIAGNOSIS — L72.3 INFECTED SEBACEOUS CYST: ICD-10-CM

## 2019-10-04 DIAGNOSIS — R07.9 CHEST PAIN: ICD-10-CM

## 2019-10-04 DIAGNOSIS — I10 HTN (HYPERTENSION): Primary | ICD-10-CM

## 2019-10-04 DIAGNOSIS — R51.9 ACUTE NONINTRACTABLE HEADACHE, UNSPECIFIED HEADACHE TYPE: ICD-10-CM

## 2019-10-04 LAB
ALBUMIN SERPL BCP-MCNC: 3.7 G/DL (ref 3.5–5.2)
ALP SERPL-CCNC: 117 U/L (ref 55–135)
ALT SERPL W/O P-5'-P-CCNC: 33 U/L (ref 10–44)
ANION GAP SERPL CALC-SCNC: 11 MMOL/L (ref 8–16)
ANION GAP SERPL CALC-SCNC: 14 MMOL/L (ref 8–16)
AST SERPL-CCNC: 32 U/L (ref 10–40)
BASOPHILS # BLD AUTO: 0.04 K/UL (ref 0–0.2)
BASOPHILS NFR BLD: 0.5 % (ref 0–1.9)
BILIRUB SERPL-MCNC: 0.3 MG/DL (ref 0.1–1)
BUN SERPL-MCNC: 12 MG/DL (ref 6–20)
BUN SERPL-MCNC: 14 MG/DL (ref 6–20)
CALCIUM SERPL-MCNC: 10.2 MG/DL (ref 8.7–10.5)
CALCIUM SERPL-MCNC: 9.9 MG/DL (ref 8.7–10.5)
CHLORIDE SERPL-SCNC: 95 MMOL/L (ref 95–110)
CHLORIDE SERPL-SCNC: 97 MMOL/L (ref 95–110)
CO2 SERPL-SCNC: 27 MMOL/L (ref 23–29)
CO2 SERPL-SCNC: 29 MMOL/L (ref 23–29)
CREAT SERPL-MCNC: 0.8 MG/DL (ref 0.5–1.4)
CREAT SERPL-MCNC: 0.9 MG/DL (ref 0.5–1.4)
DIFFERENTIAL METHOD: ABNORMAL
EOSINOPHIL # BLD AUTO: 0.2 K/UL (ref 0–0.5)
EOSINOPHIL NFR BLD: 2.2 % (ref 0–8)
ERYTHROCYTE [DISTWIDTH] IN BLOOD BY AUTOMATED COUNT: 16.5 % (ref 11.5–14.5)
EST. GFR  (AFRICAN AMERICAN): >60 ML/MIN/1.73 M^2
EST. GFR  (AFRICAN AMERICAN): >60 ML/MIN/1.73 M^2
EST. GFR  (NON AFRICAN AMERICAN): >60 ML/MIN/1.73 M^2
EST. GFR  (NON AFRICAN AMERICAN): >60 ML/MIN/1.73 M^2
GLUCOSE SERPL-MCNC: 207 MG/DL (ref 70–110)
GLUCOSE SERPL-MCNC: 215 MG/DL (ref 70–110)
HCT VFR BLD AUTO: 39 % (ref 37–48.5)
HGB BLD-MCNC: 12 G/DL (ref 12–16)
IMM GRANULOCYTES # BLD AUTO: 0.03 K/UL (ref 0–0.04)
IMM GRANULOCYTES NFR BLD AUTO: 0.4 % (ref 0–0.5)
LYMPHOCYTES # BLD AUTO: 2.9 K/UL (ref 1–4.8)
LYMPHOCYTES NFR BLD: 39.4 % (ref 18–48)
MCH RBC QN AUTO: 27.5 PG (ref 27–31)
MCHC RBC AUTO-ENTMCNC: 30.8 G/DL (ref 32–36)
MCV RBC AUTO: 89 FL (ref 82–98)
MONOCYTES # BLD AUTO: 0.4 K/UL (ref 0.3–1)
MONOCYTES NFR BLD: 4.9 % (ref 4–15)
NEUTROPHILS # BLD AUTO: 3.8 K/UL (ref 1.8–7.7)
NEUTROPHILS NFR BLD: 52.6 % (ref 38–73)
NRBC BLD-RTO: 0 /100 WBC
PLATELET # BLD AUTO: 309 K/UL (ref 150–350)
PMV BLD AUTO: 9.7 FL (ref 9.2–12.9)
POTASSIUM SERPL-SCNC: 4 MMOL/L (ref 3.5–5.1)
POTASSIUM SERPL-SCNC: 4.3 MMOL/L (ref 3.5–5.1)
PROT SERPL-MCNC: 8.1 G/DL (ref 6–8.4)
RBC # BLD AUTO: 4.37 M/UL (ref 4–5.4)
SODIUM SERPL-SCNC: 136 MMOL/L (ref 136–145)
SODIUM SERPL-SCNC: 137 MMOL/L (ref 136–145)
TROPONIN I SERPL DL<=0.01 NG/ML-MCNC: <0.006 NG/ML (ref 0–0.03)
WBC # BLD AUTO: 7.29 K/UL (ref 3.9–12.7)

## 2019-10-04 PROCEDURE — 10060 I&D ABSCESS SIMPLE/SINGLE: CPT

## 2019-10-04 PROCEDURE — 96375 TX/PRO/DX INJ NEW DRUG ADDON: CPT | Mod: 59

## 2019-10-04 PROCEDURE — 63600175 PHARM REV CODE 636 W HCPCS: Performed by: REGISTERED NURSE

## 2019-10-04 PROCEDURE — 84484 ASSAY OF TROPONIN QUANT: CPT

## 2019-10-04 PROCEDURE — 93005 ELECTROCARDIOGRAM TRACING: CPT

## 2019-10-04 PROCEDURE — 96374 THER/PROPH/DIAG INJ IV PUSH: CPT | Mod: 59

## 2019-10-04 PROCEDURE — 93010 ELECTROCARDIOGRAM REPORT: CPT | Mod: ,,, | Performed by: INTERNAL MEDICINE

## 2019-10-04 PROCEDURE — 99285 EMERGENCY DEPT VISIT HI MDM: CPT | Mod: 25

## 2019-10-04 PROCEDURE — 96361 HYDRATE IV INFUSION ADD-ON: CPT

## 2019-10-04 PROCEDURE — 93010 EKG 12-LEAD: ICD-10-PCS | Mod: ,,, | Performed by: INTERNAL MEDICINE

## 2019-10-04 PROCEDURE — 80053 COMPREHEN METABOLIC PANEL: CPT

## 2019-10-04 PROCEDURE — 25000003 PHARM REV CODE 250: Performed by: REGISTERED NURSE

## 2019-10-04 PROCEDURE — 85025 COMPLETE CBC W/AUTO DIFF WBC: CPT

## 2019-10-04 RX ORDER — LIDOCAINE HYDROCHLORIDE 10 MG/ML
10 INJECTION, SOLUTION EPIDURAL; INFILTRATION; INTRACAUDAL; PERINEURAL
Status: COMPLETED | OUTPATIENT
Start: 2019-10-04 | End: 2019-10-04

## 2019-10-04 RX ORDER — DIPHENHYDRAMINE HYDROCHLORIDE 50 MG/ML
12.5 INJECTION INTRAMUSCULAR; INTRAVENOUS
Status: COMPLETED | OUTPATIENT
Start: 2019-10-04 | End: 2019-10-04

## 2019-10-04 RX ORDER — METOCLOPRAMIDE HYDROCHLORIDE 5 MG/ML
10 INJECTION INTRAMUSCULAR; INTRAVENOUS
Status: COMPLETED | OUTPATIENT
Start: 2019-10-04 | End: 2019-10-04

## 2019-10-04 RX ADMIN — SODIUM CHLORIDE 1000 ML: 0.9 INJECTION, SOLUTION INTRAVENOUS at 07:10

## 2019-10-04 RX ADMIN — METOCLOPRAMIDE 10 MG: 5 INJECTION, SOLUTION INTRAMUSCULAR; INTRAVENOUS at 07:10

## 2019-10-04 RX ADMIN — LIDOCAINE HYDROCHLORIDE 100 MG: 10 INJECTION, SOLUTION EPIDURAL; INFILTRATION; INTRACAUDAL; PERINEURAL at 07:10

## 2019-10-04 RX ADMIN — DIPHENHYDRAMINE HYDROCHLORIDE 12.5 MG: 50 INJECTION, SOLUTION INTRAMUSCULAR; INTRAVENOUS at 07:10

## 2019-10-04 NOTE — PROGRESS NOTES
Digital Medicine: Clinician Introduction    Yolanda Betts is a 49 y.o. female who is newly enrolled in the Digital Medicine Clinic.    The following information was reviewed and updated:  Preferred pharmacy   [facility]/pharmacy #[id number] - [place] - [address]  [address]  [place] [address]  Phone: [phone] Fax: [phone]    [facility]  [address]  [place] [address]  Phone: [phone] Fax: [phone]      Patient prefers a 90 days supply.     Review of patient's allergies indicates:   Allergen Reactions    Codeine Itching    Lortab [hydrocodone-acetaminophen] Itching    Neuromuscular blockers, steroidal      some    Latex, natural rubber Rash    Morphine Rash     itching    Seconal [secobarbital sodium] Rash     itching    Tylox [oxycodone-acetaminophen] Rash       The history is provided by the patient.     Follow Up  Follow-up reason(s): reading review and medication change follow-up      Readings are trending down due to medication adherence.    Patient started new medication.    Date:  10/2/2019  Is patient tolerating med change?:  YesPatient's BP is responding to medication changes.   Patient took clonidine 0.1 mg last night after a readings of SBP>150 mmHg.     BMP results reviewed - WNL except glucose. Patient states she was not fasting for labs and this is her baseline glucose right now.    Patient reports she feels weak and thinks it is because of the medication changes and her not eating since a snack after lunch yesterday.     Patient unable to detail hydration with me.     Depression  Yolanda Betts is in treatment for depression and is currently taking medication.    Sleep Apnea  Patient previously diagnosed with ALEN and is not interested in a referral at this time.  She reports she is not currently using CPAP.     Medication Affordability  Patient is currently not having problems affording  medications      INTERVENTION(S)  encouragement/support    PLAN  additional monitoring needed    Advised patient to eat regular meals and stay adequately hydrated.   Reviewed s/sx of hypotension and when to hold medications.     Advised patient to continue taking amlodipine 5 mg daily for the next 2 weeks and to start taking clonidine 0.1 mg PRN for SBP >160 mmHg.     Will continue to monitor and plan to call patient back for check up in the next 2 weeks. Patient knows to contact me if she needs anything/has any questions about her HTN.   Patient also aware to contact [facility] or 911 if she feels she is having an emergent situation.         There are no preventive care reminders to display for this patient.    Current Medication Regimen:  Hypertension Medications             amLODIPine (NORVASC) 10 MG tablet Take 1 tablet (10 mg total) by mouth once daily.    cloNIDine (CATAPRES) 0.1 MG tablet Take 1 tablet (0.1 mg total) by mouth every 6 (six) hours as needed (for SBP>150 or DBP>100 mmHg).    metoprolol succinate (TOPROL-XL) 100 MG 24 hr tablet Take 1 tablet (100 mg total) by mouth once daily.    nitroGLYCERIN (NITROSTAT) 0.4 MG SL tablet Dissolve 1 tablet under the tongue every 5 minutes as needed, up to 3 times. If chest pain persists, call 911.    valsartan-hydrochlorothiazide (DIOVAN-HCT) 320-25 mg per tablet Take 1 tablet by mouth once daily.            Reviewed the importance of self-monitoring, medication adherence, and that the health  can be used as a resource for lifestyle modifications to help reduce or maintain a healthy lifestyle.    Sent link to [facility] webpages and my contact information via [employer] for future questions. Follow up scheduled.

## 2019-10-05 NOTE — ED PROVIDER NOTES
"SCRIBE #1 NOTE: I, Bernice Brown, am scribing for, and in the presence of, Federico Prajapati Jr., [facility]. I have scribed the entire note.       History     Chief Complaint   Patient presents with    Hypertension     pt reports HTN and a HA 10/10 frontal lobe. +nausea and visual changes. pt reports she took her bp meds this am     Review of patient's allergies indicates:   Allergen Reactions    Codeine Itching    Lortab [hydrocodone-acetaminophen] Itching    Neuromuscular blockers, steroidal      some    Latex, natural rubber Rash    Morphine Rash     itching    Seconal [secobarbital sodium] Rash     itching    Tylox [oxycodone-acetaminophen] Rash         History of Present Illness     HPI    10/4/2019, 7:08 PM  History obtained from the patient      History of Present Illness: Yolanda Betts is a 49 y.o. female patient with a PMHx of asthma, arthritis, anxiety, HTN, GERD, DM, ALEN, and obesity who presents to the Emergency Department for evaluation of hypertension which onset gradually over the last day. Pt reports that she has been compliant with her HTN medication with her last dose this morning. Pt's home BP medications include Amlodipine, Clonidine, Divan HTZ, and Metoprolol. Pt is c/o of an accompanying constant frontal HA rated a 10/10 with blurry vision. Pt reports that her current headache is more constant than other prior headaches. Additionally, pt states that she has had intermittent CP and SOB x2 weeks. Pt reports a "boil" to the R side of her back and is currently compliant with and treated with Bactrim and Bactroban ointment. Symptoms are constant. No mitigating or exacerbating factors reported. Associated sxs include nausea, dizziness, lightheadedness, frontal HA, wound, CP, SOB, and visual changes (blurry). Patient denies any fever/ chills, BLE edema, palpations, cough, chest tightness, congestion, dysuria, abdominal pain, emesis, neck pain/ stiffness, numbness, photophobia, speech " difficulty, facial droop, confusion, and all other sxs at this time. Prior Tx includes morning dose of BP medication, Bactrim, and Bactroban ointment. No further complaints or concerns at this time.     Arrival mode: Personal vehicle      PCP: CHARLA Beach MD        Past Medical History:  Past Medical History:   Diagnosis Date    Abnormal Pap smear of cervix     HPV genital warts    Anemia     Anxiety     Arthritis     Asthma 10/11/2016    Bipolar 1 disorder     Diabetes mellitus, type 2     Dyslipidemia associated with type 2 diabetes mellitus 2019    Genital warts     GERD (gastroesophageal reflux disease)     Hypertension     Hypertension complicating diabetes 2019    Mild persistent asthma without complication 10/11/2016    Morbid obesity with body mass index (BMI) of 45.0 to 49.9 in adult 8/3/2017    Obstructive sleep apnea     Schizoaffective disorder, bipolar type 2019    Seasonal allergic rhinitis due to pollen 2019    Type 2 diabetes mellitus with hyperglycemia, without long-term current use of insulin 2017       Past Surgical History:  Past Surgical History:   Procedure Laterality Date    BELT ABDOMINOPLASTY      BREAST SURGERY Bilateral     Reduction     SECTION      X 2    COLONOSCOPY      COLONOSCOPY N/A 2018    Procedure: colonoscopy for iron deficiency anemia;  Surgeon: Frankie Sanchez MD;  Location: [facility];  Service: Endoscopy;  Laterality: N/A;    COLONOSCOPY N/A 2018    Procedure: COLONOSCOPY;  Surgeon: Frankie Sanchez MD;  Location: [facility];  Service: Endoscopy;  Laterality: N/A;    HYSTERECTOMY      w/ BSO; hypermenorrhea    MOUTH SURGERY      OOPHORECTOMY      hyst/bso, hypermenorrhea    TUBAL LIGATION           Family History:  Family History   Problem Relation Age of Onset    Diabetes Mother     Hyperlipidemia Mother     Hypertension Mother     Asthma Mother     COPD Mother     Glaucoma  Mother     Thyroid disease Mother     Hypertension Father     Hyperlipidemia Father     Cancer Father         Brain, lung, liver, kidney    Heart disease Maternal Grandmother     Hyperlipidemia Maternal Grandmother     Hypertension Maternal Grandmother     Cataracts Maternal Grandmother     Diabetes Maternal Grandmother     Heart disease Maternal Grandfather     Hyperlipidemia Maternal Grandfather     Hypertension Maternal Grandfather     Glaucoma Maternal Grandfather     Cancer Maternal Grandfather     Cataracts Maternal Grandfather     Macular degeneration Maternal Grandfather     Diabetes Maternal Grandfather     Heart disease Paternal Grandmother     Hyperlipidemia Paternal Grandmother     Hypertension Paternal Grandmother     Cataracts Paternal Grandmother     Heart disease Paternal Grandfather     Hyperlipidemia Paternal Grandfather     Hypertension Paternal Grandfather     Cataracts Paternal Grandfather     Breast cancer Maternal Cousin     Breast cancer Maternal Cousin     Breast cancer Maternal Cousin     Breast cancer Maternal Cousin        Social History:  Social History     Tobacco Use    Smoking status: Never Smoker    Smokeless tobacco: Never Used   Substance and Sexual Activity    Alcohol use: Yes     Alcohol/week: 0.0 standard drinks     Comment: socially    Drug use: No    Sexual activity: Not Currently     Partners: Male        Review of Systems     Review of Systems   Constitutional: Negative for chills and fever.   HENT: Negative for congestion and sore throat.    Eyes: Positive for visual disturbance (blurry). Negative for photophobia.   Respiratory: Positive for shortness of breath. Negative for cough and chest tightness.    Cardiovascular: Positive for chest pain. Negative for palpitations and leg swelling (BLE).        + Hypertension   Gastrointestinal: Positive for nausea. Negative for abdominal pain and vomiting.   Genitourinary: Negative for dysuria.  "  Musculoskeletal: Negative for back pain, neck pain and neck stiffness.   Skin: Positive for wound ("Boil" to back). Negative for rash.   Neurological: Positive for dizziness, light-headedness and headaches (frontal). Negative for facial asymmetry, speech difficulty, weakness and numbness.   Hematological: Does not bruise/bleed easily.   Psychiatric/Behavioral: Negative for confusion.   All other systems reviewed and are negative.     Physical Exam     Initial Vitals [10/04/19 1901]   BP Pulse Resp Temp SpO2   (!) 156/87 (!) 111 15 98.5 °F (36.9 °C) 95 %      MAP       --          Physical Exam  Nursing Notes and Vital Signs Reviewed.  Constitutional: Patient is in no acute distress. Well-developed and well-nourished.  Head: Atraumatic. Normocephalic.  Eyes: PERRL. EOM intact. Conjunctivae are not pale. No scleral icterus.  ENT: Mucous membranes are moist. Oropharynx is clear and symmetric.    Neck: Supple. Full ROM. No lymphadenopathy.  Cardiovascular: Regular rate. Regular rhythm. No murmurs, rubs, or gallops. Distal pulses are 2+ and symmetric.  Pulmonary/Chest: No respiratory distress. Clear to auscultation bilaterally. No wheezing or rales.  Abdominal: Soft and non-distended.  There is no tenderness.  No rebound, guarding, or rigidity. Good bowel sounds.  Genitourinary: No CVA tenderness  Musculoskeletal: Moves all extremities. No obvious deformities. No edema. No calf tenderness.  Skin: Warm and dry.  Right posterior ribs/ flank region contains a 2 by 3 cm area of erythema, tenderness, and fluctuance.  Neurological:  Alert, awake, and appropriate.  Normal speech.  No acute focal neurological deficits are appreciated.  Psychiatric: Normal affect. Good eye contact. Appropriate in content.     ED Course   I & D - Incision and Drainage  Date/Time: 10/4/2019 9:00 PM  Performed by: KARRIE Dawson Jr.  Authorized by: KARRIE Dawson Jr.   Consent Done: Yes  Consent: Verbal consent obtained.  Risks " "and benefits: risks, benefits and alternatives were discussed  Consent given by: patient  Patient understanding: patient states understanding of the procedure being performed  Patient consent: the patient's understanding of the procedure matches consent given  Time out: Immediately prior to procedure a "time out" was called to verify the correct patient, procedure, equipment, support staff and site/side marked as required.  Type: cyst (Sebaceous cyst)  Body area: trunk  Location details: back  Anesthesia: local infiltration    Anesthesia:  Local anesthesia used: yes  Local Anesthetic: lidocaine 1% without epinephrine  Anesthetic total: 4 mL  Patient sedated: no  Scalpel size: 11  Incision type: single straight  Complexity: simple  Drainage: bloody (Sebum drained)  Drainage amount: moderate  Wound treatment: incision,  drainage and  sebum removal  Packing material: none  Complications: No  Specimens: No  Implants: No  Patient tolerance: Patient tolerated the procedure well with no immediate complications  Comments: Placed 4 by 4 gauze on top of wound.         ED Vital Signs:  Vitals:    10/04/19 1901 10/04/19 2143   BP: (!) 156/87 (!) 146/87   Pulse: (!) 111 91   Resp: 15 18   Temp: 98.5 °F (36.9 °C) 98.1 °F (36.7 °C)   TempSrc: Oral Oral   SpO2: 95% 98%   Weight: 93.7 kg (206 lb 9.1 oz)    Height: 4' 8" (1.422 m)        Abnormal Lab Results:  Labs Reviewed   CBC W/ AUTO DIFFERENTIAL - Abnormal; Notable for the following components:       Result Value    Mean Corpuscular Hemoglobin Conc 30.8 (*)     RDW 16.5 (*)     All other components within normal limits   COMPREHENSIVE METABOLIC PANEL - Abnormal; Notable for the following components:    Glucose 215 (*)     All other components within normal limits   TROPONIN I   URINALYSIS, REFLEX TO URINE CULTURE        All Lab Results:  Results for orders placed or performed during the hospital encounter of 10/04/19   CBC auto differential   Result Value Ref Range    WBC 7.29 " 3.90 - 12.70 K/uL    RBC 4.37 4.00 - 5.40 M/uL    Hemoglobin 12.0 12.0 - 16.0 g/dL    Hematocrit 39.0 37.0 - 48.5 %    Mean Corpuscular Volume 89 82 - 98 fL    Mean Corpuscular Hemoglobin 27.5 27.0 - 31.0 pg    Mean Corpuscular Hemoglobin Conc 30.8 (L) 32.0 - 36.0 g/dL    RDW 16.5 (H) 11.5 - 14.5 %    Platelets 309 150 - 350 K/uL    MPV 9.7 9.2 - 12.9 fL    Immature Granulocytes 0.4 0.0 - 0.5 %    Gran # (ANC) 3.8 1.8 - 7.7 K/uL    Immature Grans (Abs) 0.03 0.00 - 0.04 K/uL    Lymph # 2.9 1.0 - 4.8 K/uL    Mono # 0.4 0.3 - 1.0 K/uL    Eos # 0.2 0.0 - 0.5 K/uL    Baso # 0.04 0.00 - 0.20 K/uL    nRBC 0 0 /100 WBC    Gran% 52.6 38.0 - 73.0 %    Lymph% 39.4 18.0 - 48.0 %    Mono% 4.9 4.0 - 15.0 %    Eosinophil% 2.2 0.0 - 8.0 %    Basophil% 0.5 0.0 - 1.9 %    Differential Method Automated    Comprehensive metabolic panel   Result Value Ref Range    Sodium 137 136 - 145 mmol/L    Potassium 4.0 3.5 - 5.1 mmol/L    Chloride 97 95 - 110 mmol/L    CO2 29 23 - 29 mmol/L    Glucose 215 (H) 70 - 110 mg/dL    BUN, Bld 12 6 - 20 mg/dL    Creatinine 0.8 0.5 - 1.4 mg/dL    Calcium 9.9 8.7 - 10.5 mg/dL    Total Protein 8.1 6.0 - 8.4 g/dL    Albumin 3.7 3.5 - 5.2 g/dL    Total Bilirubin 0.3 0.1 - 1.0 mg/dL    Alkaline Phosphatase 117 55 - 135 U/L    AST 32 10 - 40 U/L    ALT 33 10 - 44 U/L    Anion Gap 11 8 - 16 mmol/L    eGFR if African American >60 >60 mL/min/1.73 m^2    eGFR if non African American >60 >60 mL/min/1.73 m^2   Troponin I   Result Value Ref Range    Troponin I <0.006 0.000 - 0.026 ng/mL         Imaging Results:  Imaging Results          X-Ray Chest PA And Lateral (Final result)  Result time 10/04/19 19:44:45    Final result by Neville Preciado MD (10/04/19 19:44:45)                 Impression:      No acute findings.      Electronically signed by: Neville Preciado MD  Date:    10/04/2019  Time:    19:44             Narrative:    EXAMINATION:  XR CHEST PA AND LATERAL    CLINICAL HISTORY  Acute chest  pain,    COMPARISON:  05/02/2019    FINDINGS:  The heart size is normal.  The lung fields are clear.  No acute cardiopulmonary infiltrative.                                 The EKG was ordered, reviewed, and independently interpreted by the ED provider.  Interpretation time: 19:06  Rate: 111 BPM  Rhythm: sinus tachycardia  Interpretation: Moderate voltage criteria for LVH, may be normal variant. Nonspecific ST abnormality. Abnormal ECG. No STEMI.  When compared to EKG performed 5/8/19, ventricular rate has increased by 41 bpm.           The Emergency Provider reviewed the vital signs and test results, which are outlined above.     ED Discussion     9:15 PM: Reassessed pt at this time.  Pt states her HA has improved at this time. Discussed with pt all pertinent ED information and results. Discussed pt dx and plan of tx. Gave pt all f/u and return to the ED instructions. All questions and concerns were addressed at this time. Pt expresses understanding of information and instructions, and is comfortable with plan to discharge. Pt is stable for discharge.    Patient's headache is either consistent with previous headache and/or lacks features concerning for emergent or life threatening condition.  I do not suspect SAH, meningitis, increased IC pressure, infectious, toxic, vascular, CNS, or other EMC.  I have discussed this at length with patient and/or family/caretaker.    I have discussed with patient and/or family/caretaker chest pain precautions, specifically to return for worsening chest pain, shortness of breath, fever, or any concern.  I have low suspicion for cardiopulmonary, vascular, infectious, respiratory, or other emergent medical condition based on my evaluation in the ED.    I discussed wound care precautions with patient and/or family/caretaker; specifically that all wounds have risk of infection despite efforts to cleanse and debride the wound; and there is a risk of an occult foreign body (and thus  increased risk of infection) despite a negative examination.  I discussed with patient need to return for any signs of infection, specifically redness, increased pain, fever, drainage of pus, or any concern, immediately.    I discussed with patient and/or family/caretaker that evaluation in the ED does not suggest any emergent or life threatening medical conditions requiring immediate intervention beyond what was provided in the ED, and I believe patient is safe for discharge.  Regardless, an unremarkable evaluation in the ED does not preclude the development or presence of a serious of life threatening condition. As such, patient was instructed to return immediately for any worsening or change in current symptoms.         Medical Decision Making:   Clinical Tests:   Lab Tests: Ordered and Reviewed  Radiological Study: Ordered and Reviewed  Medical Tests: Ordered and Reviewed           ED Medication(s):  Medications   sodium chloride 0.9% bolus 1,000 mL (0 mLs Intravenous Stopped 10/4/19 2055)   metoclopramide HCl injection 10 mg (10 mg Intravenous Given 10/4/19 1949)   diphenhydrAMINE injection 12.5 mg (12.5 mg Intravenous Given 10/4/19 1949)   lidocaine (PF) 10 mg/ml (1%) injection 100 mg (100 mg Infiltration Given 10/4/19 1949)       Discharge Medication List as of 10/4/2019  9:14 PM          Follow-up Information     L Ady Beach MD In 3 days.    Specialty:  Family Medicine  Contact information:  [id number]  [place] [address]  [phone]                       Scribe Attestation:   Scribe #1: I performed the above scribed service and the documentation accurately describes the services I performed. I attest to the accuracy of the note.     Attending:   Physician Attestation Statement for Scribe #1: I, Federico Prajapati Jr., FNP, personally performed the services described in this documentation, as scribed by Bernice Brown, in my presence, and it is both accurate and complete.            Clinical Impression       ICD-10-CM ICD-9-CM   1. HTN (hypertension) I10 401.9   2. Chest pain R07.9 786.50   3. Infected sebaceous cyst L72.3 706.2    L08.9    4. Acute nonintractable headache, unspecified headache type R51 784.0       Disposition:   Disposition: Discharged  Condition: Stable         Federico Prajapati Jr., [facility]  10/04/19 2148

## 2019-10-07 ENCOUNTER — OFFICE VISIT (OUTPATIENT)
Dept: URGENT CARE | Facility: CLINIC | Age: 49
End: 2019-10-07
Payer: MEDICAID

## 2019-10-07 ENCOUNTER — PATIENT MESSAGE (OUTPATIENT)
Dept: INTERNAL MEDICINE | Facility: CLINIC | Age: 49
End: 2019-10-07

## 2019-10-07 ENCOUNTER — TELEPHONE (OUTPATIENT)
Dept: INTERNAL MEDICINE | Facility: CLINIC | Age: 49
End: 2019-10-07

## 2019-10-07 ENCOUNTER — TELEPHONE (OUTPATIENT)
Dept: OBSTETRICS AND GYNECOLOGY | Facility: CLINIC | Age: 49
End: 2019-10-07

## 2019-10-07 ENCOUNTER — HOSPITAL ENCOUNTER (EMERGENCY)
Facility: HOSPITAL | Age: 49
Discharge: HOME | End: 2019-10-07
Attending: EMERGENCY MEDICINE
Payer: MEDICAID

## 2019-10-07 VITALS
BODY MASS INDEX: 46.34 KG/M2 | RESPIRATION RATE: 16 BRPM | WEIGHT: 206 LBS | TEMPERATURE: 98 F | HEIGHT: 56 IN | SYSTOLIC BLOOD PRESSURE: 146 MMHG | HEART RATE: 95 BPM | DIASTOLIC BLOOD PRESSURE: 72 MMHG | OXYGEN SATURATION: 98 %

## 2019-10-07 VITALS
HEIGHT: 56 IN | BODY MASS INDEX: 46.34 KG/M2 | WEIGHT: 206 LBS | SYSTOLIC BLOOD PRESSURE: 149 MMHG | HEART RATE: 104 BPM | DIASTOLIC BLOOD PRESSURE: 94 MMHG | OXYGEN SATURATION: 98 % | TEMPERATURE: 98 F

## 2019-10-07 DIAGNOSIS — L02.91 ABSCESS: Primary | ICD-10-CM

## 2019-10-07 DIAGNOSIS — D23.5 DERMOID CYST OF TRUNK: ICD-10-CM

## 2019-10-07 DIAGNOSIS — R07.9 CHEST PAIN: ICD-10-CM

## 2019-10-07 DIAGNOSIS — I10 ESSENTIAL HYPERTENSION: Primary | ICD-10-CM

## 2019-10-07 LAB
ALBUMIN SERPL BCP-MCNC: 4.1 G/DL (ref 3.5–5.2)
ALP SERPL-CCNC: 110 U/L (ref 55–135)
ALT SERPL W/O P-5'-P-CCNC: 51 U/L (ref 10–44)
ANION GAP SERPL CALC-SCNC: 16 MMOL/L (ref 8–16)
AST SERPL-CCNC: 54 U/L (ref 10–40)
BASOPHILS # BLD AUTO: 0.06 K/UL (ref 0–0.2)
BASOPHILS NFR BLD: 0.7 % (ref 0–1.9)
BILIRUB SERPL-MCNC: 0.3 MG/DL (ref 0.1–1)
BNP SERPL-MCNC: <10 PG/ML (ref 0–99)
BUN SERPL-MCNC: 10 MG/DL (ref 6–20)
CALCIUM SERPL-MCNC: 10.8 MG/DL (ref 8.7–10.5)
CHLORIDE SERPL-SCNC: 97 MMOL/L (ref 95–110)
CO2 SERPL-SCNC: 25 MMOL/L (ref 23–29)
CREAT SERPL-MCNC: 0.8 MG/DL (ref 0.5–1.4)
DIFFERENTIAL METHOD: ABNORMAL
EOSINOPHIL # BLD AUTO: 0.1 K/UL (ref 0–0.5)
EOSINOPHIL NFR BLD: 1.6 % (ref 0–8)
ERYTHROCYTE [DISTWIDTH] IN BLOOD BY AUTOMATED COUNT: 16.5 % (ref 11.5–14.5)
EST. GFR  (AFRICAN AMERICAN): >60 ML/MIN/1.73 M^2
EST. GFR  (NON AFRICAN AMERICAN): >60 ML/MIN/1.73 M^2
GLUCOSE SERPL-MCNC: 133 MG/DL (ref 70–110)
HCT VFR BLD AUTO: 42 % (ref 37–48.5)
HGB BLD-MCNC: 12.5 G/DL (ref 12–16)
IMM GRANULOCYTES # BLD AUTO: 0.08 K/UL (ref 0–0.04)
IMM GRANULOCYTES NFR BLD AUTO: 1 % (ref 0–0.5)
LYMPHOCYTES # BLD AUTO: 3 K/UL (ref 1–4.8)
LYMPHOCYTES NFR BLD: 36.3 % (ref 18–48)
MCH RBC QN AUTO: 26.7 PG (ref 27–31)
MCHC RBC AUTO-ENTMCNC: 29.8 G/DL (ref 32–36)
MCV RBC AUTO: 90 FL (ref 82–98)
MONOCYTES # BLD AUTO: 0.4 K/UL (ref 0.3–1)
MONOCYTES NFR BLD: 4.9 % (ref 4–15)
NEUTROPHILS # BLD AUTO: 4.6 K/UL (ref 1.8–7.7)
NEUTROPHILS NFR BLD: 55.5 % (ref 38–73)
NRBC BLD-RTO: 0 /100 WBC
PLATELET # BLD AUTO: 300 K/UL (ref 150–350)
PMV BLD AUTO: 9.4 FL (ref 9.2–12.9)
POTASSIUM SERPL-SCNC: 4.3 MMOL/L (ref 3.5–5.1)
PROT SERPL-MCNC: 8.4 G/DL (ref 6–8.4)
RBC # BLD AUTO: 4.68 M/UL (ref 4–5.4)
SODIUM SERPL-SCNC: 138 MMOL/L (ref 136–145)
TROPONIN I SERPL DL<=0.01 NG/ML-MCNC: 0.01 NG/ML (ref 0–0.03)
WBC # BLD AUTO: 8.21 K/UL (ref 3.9–12.7)

## 2019-10-07 PROCEDURE — 83880 ASSAY OF NATRIURETIC PEPTIDE: CPT

## 2019-10-07 PROCEDURE — 93005 ELECTROCARDIOGRAM TRACING: CPT

## 2019-10-07 PROCEDURE — 80053 COMPREHEN METABOLIC PANEL: CPT

## 2019-10-07 PROCEDURE — 25000003 PHARM REV CODE 250: Performed by: EMERGENCY MEDICINE

## 2019-10-07 PROCEDURE — 25000003 PHARM REV CODE 250: Performed by: PHYSICIAN ASSISTANT

## 2019-10-07 PROCEDURE — 93010 EKG 12-LEAD: ICD-10-PCS | Mod: ,,, | Performed by: INTERNAL MEDICINE

## 2019-10-07 PROCEDURE — 99999 PR PBB SHADOW E&M-EST. PATIENT-LVL V: CPT | Mod: PBBFAC,,, | Performed by: PHYSICIAN ASSISTANT

## 2019-10-07 PROCEDURE — 99213 PR OFFICE/OUTPT VISIT, EST, LEVL III, 20-29 MIN: ICD-10-PCS | Mod: S$PBB,,, | Performed by: PHYSICIAN ASSISTANT

## 2019-10-07 PROCEDURE — 85025 COMPLETE CBC W/AUTO DIFF WBC: CPT

## 2019-10-07 PROCEDURE — 84484 ASSAY OF TROPONIN QUANT: CPT

## 2019-10-07 PROCEDURE — 93010 ELECTROCARDIOGRAM REPORT: CPT | Mod: ,,, | Performed by: INTERNAL MEDICINE

## 2019-10-07 PROCEDURE — 99213 OFFICE O/P EST LOW 20 MIN: CPT | Mod: S$PBB,,, | Performed by: PHYSICIAN ASSISTANT

## 2019-10-07 PROCEDURE — 99215 OFFICE O/P EST HI 40 MIN: CPT | Mod: PBBFAC,PO,25 | Performed by: PHYSICIAN ASSISTANT

## 2019-10-07 PROCEDURE — 99284 EMERGENCY DEPT VISIT MOD MDM: CPT | Mod: 25,27

## 2019-10-07 PROCEDURE — 99999 PR PBB SHADOW E&M-EST. PATIENT-LVL V: ICD-10-PCS | Mod: PBBFAC,,, | Performed by: PHYSICIAN ASSISTANT

## 2019-10-07 RX ORDER — VALACYCLOVIR HYDROCHLORIDE 1 G/1
1000 TABLET, FILM COATED ORAL DAILY
Qty: 30 TABLET | Refills: 11 | Status: SHIPPED | OUTPATIENT
Start: 2019-10-07 | End: 2019-12-17 | Stop reason: SDUPTHER

## 2019-10-07 RX ORDER — LIDOCAINE HYDROCHLORIDE 10 MG/ML
10 INJECTION, SOLUTION EPIDURAL; INFILTRATION; INTRACAUDAL; PERINEURAL
Status: COMPLETED | OUTPATIENT
Start: 2019-10-07 | End: 2019-10-07

## 2019-10-07 RX ORDER — ASPIRIN 325 MG
325 TABLET ORAL
Status: COMPLETED | OUTPATIENT
Start: 2019-10-07 | End: 2019-10-07

## 2019-10-07 RX ORDER — TRAMADOL HYDROCHLORIDE 50 MG/1
50 TABLET ORAL
Status: COMPLETED | OUTPATIENT
Start: 2019-10-07 | End: 2019-10-07

## 2019-10-07 RX ORDER — ACYCLOVIR 400 MG/1
400 TABLET ORAL 3 TIMES DAILY
Qty: 30 TABLET | Refills: 0 | Status: SHIPPED | OUTPATIENT
Start: 2019-10-07 | End: 2019-12-09

## 2019-10-07 RX ORDER — TRAMADOL HYDROCHLORIDE 50 MG/1
50 TABLET ORAL EVERY 6 HOURS PRN
Qty: 12 TABLET | Refills: 0 | Status: SHIPPED | OUTPATIENT
Start: 2019-10-07 | End: 2019-12-09

## 2019-10-07 RX ADMIN — TRAMADOL HYDROCHLORIDE 50 MG: 50 TABLET, FILM COATED ORAL at 04:10

## 2019-10-07 RX ADMIN — ASPIRIN 325 MG ORAL TABLET 325 MG: 325 PILL ORAL at 03:10

## 2019-10-07 NOTE — TELEPHONE ENCOUNTER
Spoke with patient and informed her that there are no available appointments for Dr. Beach or any other providers at this clinic today and that Dr. Beach recommends she go to a walk in clinic for care of her high blood pressure and boil today. Patient verbalized understanding.

## 2019-10-07 NOTE — ED PROVIDER NOTES
SCRIBE #1 NOTE: I, Mario Solorio, am scribing for, and in the presence of, Bette Guevara MD. I have scribed the entire note.      History      Chief Complaint   Patient presents with    Hypertension     patient states at home elevated BP    Abscess     patient states she was seen last Friday for abscess to her back and needs a recheck        Review of patient's allergies indicates:   Allergen Reactions    Codeine Itching    Lortab [hydrocodone-acetaminophen] Itching    Neuromuscular blockers, steroidal      some    Latex, natural rubber Rash    Morphine Rash     itching    Seconal [secobarbital sodium] Rash     itching    Tylox [oxycodone-acetaminophen] Rash        HPI   HPI    10/7/2019, 3:37 PM   History obtained from the patient      History of Present Illness: Yolanda Betts is a 49 y.o. female patient with a PMHx of HTN who presents to the Emergency Department for hypertension, onset several days PTA. Pt is on multiple BP medications, but states that her BP is not well-controlled. Pt states that her highest BP reading was 192/121. Symptoms are constant and moderate in severity. No mitigating or exacerbating factors reported. Associated sxs include intermittent chest pain. Patient denies any fever, chills, n/v/d, SOB, weakness, numbness, dizziness, headache, and all other sxs at this time. Pt also c/o R flank abscess, onset 3 weeks PTA. Pt states that she had the area treated twice; she most recently had the area drained 3 days ago here in the ED. No further complaints or concerns at this time.       Arrival mode: Personal vehicle    PCP: CHARLA Beach MD       Past Medical History:  Past Medical History:   Diagnosis Date    Abnormal Pap smear of cervix     HPV genital warts    Anemia     Anxiety     Arthritis     Asthma 10/11/2016    Bipolar 1 disorder     Diabetes mellitus, type 2     Dyslipidemia associated with type 2 diabetes mellitus 5/13/2019    Genital warts     GERD  (gastroesophageal reflux disease)     Hypertension     Hypertension complicating diabetes 2019    Mild persistent asthma without complication 10/11/2016    Morbid obesity with body mass index (BMI) of 45.0 to 49.9 in adult 8/3/2017    Obstructive sleep apnea     Schizoaffective disorder, bipolar type 2019    Seasonal allergic rhinitis due to pollen 2019    Type 2 diabetes mellitus with hyperglycemia, without long-term current use of insulin 2017       Past Surgical History:  Past Surgical History:   Procedure Laterality Date    BELT ABDOMINOPLASTY      BREAST SURGERY Bilateral 1996    Reduction     SECTION      X 2    COLONOSCOPY      COLONOSCOPY N/A 2018    Procedure: colonoscopy for iron deficiency anemia;  Surgeon: Frankie Sanchez MD;  Location: [facility] ENDO;  Service: Endoscopy;  Laterality: N/A;    COLONOSCOPY N/A 2018    Procedure: COLONOSCOPY;  Surgeon: Frankie Sanchez MD;  Location: [facility] ENDO;  Service: Endoscopy;  Laterality: N/A;    HYSTERECTOMY      w/ BSO; hypermenorrhea    MOUTH SURGERY      OOPHORECTOMY      hyst/bso, hypermenorrhea    TUBAL LIGATION           Family History:  Family History   Problem Relation Age of Onset    Diabetes Mother     Hyperlipidemia Mother     Hypertension Mother     Asthma Mother     COPD Mother     Glaucoma Mother     Thyroid disease Mother     Hypertension Father     Hyperlipidemia Father     Cancer Father         Brain, lung, liver, kidney    Heart disease Maternal Grandmother     Hyperlipidemia Maternal Grandmother     Hypertension Maternal Grandmother     Cataracts Maternal Grandmother     Diabetes Maternal Grandmother     Heart disease Maternal Grandfather     Hyperlipidemia Maternal Grandfather     Hypertension Maternal Grandfather     Glaucoma Maternal Grandfather     Cancer Maternal Grandfather     Cataracts Maternal Grandfather     Macular degeneration Maternal Grandfather      Diabetes Maternal Grandfather     Heart disease Paternal Grandmother     Hyperlipidemia Paternal Grandmother     Hypertension Paternal Grandmother     Cataracts Paternal Grandmother     Heart disease Paternal Grandfather     Hyperlipidemia Paternal Grandfather     Hypertension Paternal Grandfather     Cataracts Paternal Grandfather     Breast cancer Maternal Cousin     Breast cancer Maternal Cousin     Breast cancer Maternal Cousin     Breast cancer Maternal Cousin        Social History:  Social History     Tobacco Use    Smoking status: Never Smoker    Smokeless tobacco: Never Used   Substance and Sexual Activity    Alcohol use: Yes     Alcohol/week: 0.0 standard drinks     Comment: socially    Drug use: No    Sexual activity: Not Currently     Partners: Male       ROS   Review of Systems   Constitutional: Negative for chills, diaphoresis, fatigue and fever.   HENT: Negative for sore throat.    Respiratory: Negative for shortness of breath.    Cardiovascular: Positive for chest pain (intermittent).        (+) HTN   Gastrointestinal: Negative for diarrhea, nausea and vomiting.   Genitourinary: Negative for dysuria.   Musculoskeletal: Negative for back pain.   Skin: Negative for rash.        (+) R flank abscess   Neurological: Negative for dizziness, weakness, light-headedness, numbness and headaches.   Hematological: Does not bruise/bleed easily.   All other systems reviewed and are negative.    Physical Exam      Initial Vitals [10/07/19 1343]   BP Pulse Resp Temp SpO2   (!) 151/81 95 18 98 °F (36.7 °C) 96 %      MAP       --          Physical Exam  Nursing Notes and Vital Signs Reviewed.  Constitutional: Patient is in no acute distress. Morbidly obese.  Head: Atraumatic. Normocephalic.  Eyes: PERRL. EOM intact. Conjunctivae are not pale. No scleral icterus.  ENT: Mucous membranes are moist. Oropharynx is clear and symmetric.    Neck: Supple. Full ROM. No lymphadenopathy.  Cardiovascular:  "Regular rate. Regular rhythm. No murmurs, rubs, or gallops. Distal pulses are 2+ and symmetric.  Pulmonary/Chest: No respiratory distress. Clear to auscultation bilaterally. No wheezing or rales.  Abdominal: Soft and non-distended.  There is no tenderness.  No rebound, guarding, or rigidity.   Musculoskeletal: Moves all extremities. No obvious deformities. No edema.  Skin: Warm and dry. Palpable small mass/induration/tenerness to the R flank fat fold, with mild TTP. No erythema, warmth, or fluctuance.   Neurological:  Alert, awake, and appropriate.  Normal speech.  No acute focal neurological deficits are appreciated.  Psychiatric: Normal affect. Good eye contact. Appropriate in content.    ED Course    Procedures  ED Vital Signs:  Vitals:    10/07/19 1343 10/07/19 1447 10/07/19 1458   BP: (!) 151/81     Pulse: 95  102   Resp: 18     Temp: 98 °F (36.7 °C)     SpO2: 96%     Weight: 93.5 kg (206 lb 0.3 oz)     Height:  4' 8" (1.422 m)        Abnormal Lab Results:  Labs Reviewed   CBC W/ AUTO DIFFERENTIAL - Abnormal; Notable for the following components:       Result Value    Mean Corpuscular Hemoglobin 26.7 (*)     Mean Corpuscular Hemoglobin Conc 29.8 (*)     RDW 16.5 (*)     Immature Granulocytes 1.0 (*)     Immature Grans (Abs) 0.08 (*)     All other components within normal limits   COMPREHENSIVE METABOLIC PANEL - Abnormal; Notable for the following components:    Glucose 133 (*)     Calcium 10.8 (*)     AST 54 (*)     ALT 51 (*)     All other components within normal limits   TROPONIN I   B-TYPE NATRIURETIC PEPTIDE        All Lab Results:  Results for orders placed or performed during the hospital encounter of 10/07/19   CBC auto differential   Result Value Ref Range    WBC 8.21 3.90 - 12.70 K/uL    RBC 4.68 4.00 - 5.40 M/uL    Hemoglobin 12.5 12.0 - 16.0 g/dL    Hematocrit 42.0 37.0 - 48.5 %    Mean Corpuscular Volume 90 82 - 98 fL    Mean Corpuscular Hemoglobin 26.7 (L) 27.0 - 31.0 pg    Mean Corpuscular " Hemoglobin Conc 29.8 (L) 32.0 - 36.0 g/dL    RDW 16.5 (H) 11.5 - 14.5 %    Platelets 300 150 - 350 K/uL    MPV 9.4 9.2 - 12.9 fL    Immature Granulocytes 1.0 (H) 0.0 - 0.5 %    Gran # (ANC) 4.6 1.8 - 7.7 K/uL    Immature Grans (Abs) 0.08 (H) 0.00 - 0.04 K/uL    Lymph # 3.0 1.0 - 4.8 K/uL    Mono # 0.4 0.3 - 1.0 K/uL    Eos # 0.1 0.0 - 0.5 K/uL    Baso # 0.06 0.00 - 0.20 K/uL    nRBC 0 0 /100 WBC    Gran% 55.5 38.0 - 73.0 %    Lymph% 36.3 18.0 - 48.0 %    Mono% 4.9 4.0 - 15.0 %    Eosinophil% 1.6 0.0 - 8.0 %    Basophil% 0.7 0.0 - 1.9 %    Differential Method Automated    Comprehensive metabolic panel   Result Value Ref Range    Sodium 138 136 - 145 mmol/L    Potassium 4.3 3.5 - 5.1 mmol/L    Chloride 97 95 - 110 mmol/L    CO2 25 23 - 29 mmol/L    Glucose 133 (H) 70 - 110 mg/dL    BUN, Bld 10 6 - 20 mg/dL    Creatinine 0.8 0.5 - 1.4 mg/dL    Calcium 10.8 (H) 8.7 - 10.5 mg/dL    Total Protein 8.4 6.0 - 8.4 g/dL    Albumin 4.1 3.5 - 5.2 g/dL    Total Bilirubin 0.3 0.1 - 1.0 mg/dL    Alkaline Phosphatase 110 55 - 135 U/L    AST 54 (H) 10 - 40 U/L    ALT 51 (H) 10 - 44 U/L    Anion Gap 16 8 - 16 mmol/L    eGFR if African American >60 >60 mL/min/1.73 m^2    eGFR if non African American >60 >60 mL/min/1.73 m^2   Troponin I #1   Result Value Ref Range    Troponin I 0.007 0.000 - 0.026 ng/mL     The EKG was ordered, reviewed, and independently interpreted by the ED provider.  Interpretation time: 14:58  Rate: 102 BPM  Rhythm: sinus tachycardia  Interpretation: LVH. Anterolateral infarct, age undetermined. No STEMI.             The Emergency Provider reviewed the vital signs and test results, which are outlined above.    ED Discussion     Recommended another I&D patient declined and would rather follow up outpatient with dermatology for complete excision of cyst.     3:56 PM: Reassessed pt at this time. Discussed with pt all pertinent ED information and results. Discussed pt dx and plan of tx. Gave pt all f/u and return to  the ED instructions. All questions and concerns were addressed at this time. Pt expresses understanding of information and instructions, and is comfortable with plan to discharge. Pt is stable for discharge.    I discussed with patient and/or family/caretaker that evaluation in the ED does not suggest any emergent or life threatening medical conditions requiring immediate intervention beyond what was provided in the ED, and I believe patient is safe for discharge.  Regardless, an unremarkable evaluation in the ED does not preclude the development or presence of a serious of life threatening condition. As such, patient was instructed to return immediately for any worsening or change in current symptoms.    ED Medication(s):  Medications   traMADol tablet 50 mg (has no administration in time range)   lidocaine (PF) 10 mg/ml (1%) injection 100 mg (has no administration in time range)   aspirin tablet 325 mg (325 mg Oral Given 10/7/19 6048)     New Prescriptions    ACYCLOVIR (ZOVIRAX) 400 MG TABLET    Take 1 tablet (400 mg total) by mouth 3 (three) times daily. for 10 days    TRAMADOL (ULTRAM) 50 MG TABLET    Take 1 tablet (50 mg total) by mouth every 6 (six) hours as needed.    VALACYCLOVIR (VALTREX) 1000 MG TABLET    Take 1 tablet (1,000 mg total) by mouth once daily.     Follow-up Information     Dermatology. Schedule an appointment as soon as possible for a visit in 2 days.    Specialty:  Dermatology  Why:  Return to the Emergency Room, If symptoms worsen  Contact information:  [address] [place] [address] [id number] [phone]                   Medical Decision Making    Medical Decision Making:   Clinical Tests:   Lab Tests: Ordered and Reviewed  Medical Tests: Ordered and Reviewed           Scribe Attestation:   Scribe #1: I performed the above scribed service and the documentation accurately describes the services I performed. I attest to the accuracy of the note.    Attending:   Physician  Attestation Statement for Scribe #1: I, Bette Guevara MD, personally performed the services described in this documentation, as scribed by Mario Solorio, in my presence, and it is both accurate and complete.          Clinical Impression       ICD-10-CM ICD-9-CM   1. Essential hypertension I10 401.9   2. Chest pain R07.9 786.50   3. Dermoid cyst of trunk D23.5 216.5       Disposition:   Disposition: Discharged  Condition: Stable         Bette Guevara MD  10/07/19 1600

## 2019-10-07 NOTE — PROGRESS NOTES
"Subjective:      Patient ID: Yolanda Betts is a 49 y.o. female.    Chief Complaint: Hypertension    Yolanda is a 49 year old female who presents to urgent care with an 8/10 painful abscess on her right flank area that was I&D'ed in the emergency room on 10/4.  She is on her last day of bactrim (started Bactrim prior to I&D in ED), but reports she has had sweats/felt feverish while on Bactrim.  She is in the digital hypertension program, and her blood pressure and heart rate have been elevated for the past week or two.  She admits nausea/headaches.        Review of Systems   Constitutional: Positive for chills and fever (feels feverish ).   Gastrointestinal: Positive for nausea. Negative for vomiting.   Skin:        Abscess    Neurological: Positive for headaches.       Objective:   BP (!) 149/94 (BP Location: Left arm, Patient Position: Sitting, BP Method: Large (Automatic))   Pulse 104   Temp 98 °F (36.7 °C) (Tympanic)   Ht 4' 8" (1.422 m)   Wt 93.4 kg (206 lb)   SpO2 98%   BMI 46.18 kg/m²   Physical Exam   Constitutional: She is oriented to person, place, and time. She appears well-developed and well-nourished. No distress.   HENT:   Head: Normocephalic and atraumatic.   Right Ear: External ear normal.   Left Ear: External ear normal.   Nose: Nose normal.   Cardiovascular: Normal rate.   Pulmonary/Chest: Effort normal. No respiratory distress.   Neurological: She is alert and oriented to person, place, and time. She exhibits normal muscle tone.   Skin: Skin is warm and dry. Capillary refill takes less than 2 seconds. She is not diaphoretic.        2 cm x 5 cm exquisitely tender cyst/abscess to right upper flank with significant induration, minimal fluctuance    Psychiatric: She has a normal mood and affect. Her behavior is normal.   Vitals reviewed.    Assessment:      1. Abscess       Plan:   Abscess    Abscess/Cyst    -  Extensive induration - concern for deep pocket of fluctuance - recommended " evaluation in the ED for ultrasounded guided drainage vs. general surgery consult    -  Concern for tachycardia/feel feverish while on bactrim - with possible need for IV antibiotics - ED evaluation     Called [facility] and spoke to nurse about patient (ED physician unavailable at time of call) and let know patient is on way     Angie Gutierrez PA-C   Physician Assistant   [facility]

## 2019-10-07 NOTE — TELEPHONE ENCOUNTER
Patient called and stated that she is currently having a herpes outbreak on her mouth and vaginal area, and that the valtrex is not working.  Patient also wanted to know if the valtrex would affect her blood pressure, as it is running high.  Patient stated that she just took her BP and it was 168/110.  Please advise.

## 2019-10-07 NOTE — TELEPHONE ENCOUNTER
Pt reports has had multiple outbreaks, occurs after completes valtrex course. Now having mucosal lesions as well. Would like to try alternative. Importance of suppressive tx discussed. Will do acyclovir 400 mg TID x 10d then valtrex daily suppression

## 2019-10-07 NOTE — TELEPHONE ENCOUNTER
ACTION NEEDED:  Please read Patient Portal Message (below), and then contact her to verify her receipt and understanding and schedule her for follow-up appointment. (Schedule override approved.) Thanks.        Yolanda Tate.    I am sorry to hear about you needing an incision and drainage of an abscess.  I know that can be very painful.    Based on what you told my nurse, you need to be evaluated today.  Unfortunately, I do not have any appointments available today.    You need to go to one of [facility] urgent care clinics. You can find your nearest urgent care clinic online at [email or web address] or by calling [phone].    I told my nurse to contact you and get you scheduled for a follow-up appointment with me next week.    On a positive note, your recent basic metabolic panel was normal except that your blood sugar was elevated.  We will discuss this further at your next appointment.    Take care, and be well.    -DOMINIQUE

## 2019-10-07 NOTE — TELEPHONE ENCOUNTER
----- Message from Lora Zimmerman sent at 10/7/2019  8:42 AM CDT -----  Contact: patient   Type:  Same Day Appointment Request with Dr. Beach     Caller is requesting a same day appointment.  Caller declined first available appointment listed below.    Name of Caller:Yolanda Betts   When is the first available appointment?12/2019  Symptoms:boil/bp/-er f/u  Best Call Back Number:[phone]  Additional Information:please call patient back for an earlier appointment

## 2019-10-08 ENCOUNTER — PATIENT MESSAGE (OUTPATIENT)
Dept: OBSTETRICS AND GYNECOLOGY | Facility: HOSPITAL | Age: 49
End: 2019-10-08

## 2019-10-08 ENCOUNTER — OFFICE VISIT (OUTPATIENT)
Dept: INTERNAL MEDICINE | Facility: CLINIC | Age: 49
End: 2019-10-08
Payer: MEDICAID

## 2019-10-08 ENCOUNTER — TELEPHONE (OUTPATIENT)
Dept: INTERNAL MEDICINE | Facility: CLINIC | Age: 49
End: 2019-10-08

## 2019-10-08 ENCOUNTER — TELEPHONE (OUTPATIENT)
Dept: OBSTETRICS AND GYNECOLOGY | Facility: CLINIC | Age: 49
End: 2019-10-08

## 2019-10-08 ENCOUNTER — TELEPHONE (OUTPATIENT)
Dept: CARDIOLOGY | Facility: CLINIC | Age: 49
End: 2019-10-08

## 2019-10-08 VITALS
WEIGHT: 205 LBS | HEIGHT: 56 IN | SYSTOLIC BLOOD PRESSURE: 126 MMHG | BODY MASS INDEX: 46.12 KG/M2 | TEMPERATURE: 98 F | DIASTOLIC BLOOD PRESSURE: 92 MMHG | HEART RATE: 109 BPM | OXYGEN SATURATION: 96 %

## 2019-10-08 DIAGNOSIS — I10 UNCONTROLLED HYPERTENSION: Primary | ICD-10-CM

## 2019-10-08 DIAGNOSIS — D36.9 DERMOID CYST: ICD-10-CM

## 2019-10-08 PROCEDURE — 99999 PR PBB SHADOW E&M-EST. PATIENT-LVL V: CPT | Mod: PBBFAC,,, | Performed by: NURSE PRACTITIONER

## 2019-10-08 PROCEDURE — 99213 PR OFFICE/OUTPT VISIT, EST, LEVL III, 20-29 MIN: ICD-10-PCS | Mod: S$PBB,,, | Performed by: NURSE PRACTITIONER

## 2019-10-08 PROCEDURE — 99213 OFFICE O/P EST LOW 20 MIN: CPT | Mod: S$PBB,,, | Performed by: NURSE PRACTITIONER

## 2019-10-08 PROCEDURE — 99999 PR PBB SHADOW E&M-EST. PATIENT-LVL V: ICD-10-PCS | Mod: PBBFAC,,, | Performed by: NURSE PRACTITIONER

## 2019-10-08 PROCEDURE — 99215 OFFICE O/P EST HI 40 MIN: CPT | Mod: PBBFAC | Performed by: NURSE PRACTITIONER

## 2019-10-08 RX ORDER — AMLODIPINE BESYLATE 10 MG/1
10 TABLET ORAL DAILY
COMMUNITY
End: 2020-04-03 | Stop reason: SDUPTHER

## 2019-10-08 NOTE — LETTER
October 8, 2019                 [facility] Internal Medicine  Internal Medicine  [address]  [place] [address]  Phone: [phone]  Fax: [phone]   October 8, 2019     Patient: Yolanda Betts   YOB: 1970   Date of Visit: 10/8/2019       To Whom it May Concern:    Yolanda Betts was seen in my clinic on 10/8/2019. She may return to work on 10/15/2019    Please excuse her from any work missed.    If you have any questions or concerns, please don't hesitate to call.    Sincerely,         Pati Mauro NP

## 2019-10-08 NOTE — TELEPHONE ENCOUNTER
----- Message from Pati Solorzano sent at 10/8/2019  9:12 AM CDT -----  Contact: julia Long requesting a medication change due to not accepted by insurance. Please call at [phone].    Thanks,  Pati Solorzano

## 2019-10-08 NOTE — PROGRESS NOTES
Subjective:       Patient ID: Yolanda Betts is a 49 y.o. female.    Chief Complaint: Hypertension and Recurrent Skin Infections (x 3 weeks)    Patient presents for a hospital follow up.  Has been dealing with elevated blood pressure readings.  On the home monitoring program.  Started amlodipine 5 mg.  May need to increase to 10 mg daily.  Has been having dizziness, headaches, nausea, and chest discomfort.  ER visit: cardiac work-up stable.    Hypertension   This is a chronic problem. The problem is uncontrolled. Associated symptoms include chest pain, headaches and palpitations. Risk factors for coronary artery disease include diabetes mellitus and obesity.     Review of Systems   Constitutional: Positive for appetite change and fatigue. Negative for chills.   Cardiovascular: Positive for chest pain and palpitations.   Musculoskeletal: Negative for arthralgias and gait problem.   Skin: Negative for color change and rash.   Neurological: Positive for headaches.   Psychiatric/Behavioral: Negative for agitation and confusion.       Objective:      Physical Exam   Constitutional: She is oriented to person, place, and time. She appears well-developed and well-nourished.   HENT:   Head: Normocephalic and atraumatic.   Neck: Normal range of motion.   Cardiovascular: Regular rhythm. Tachycardia present.   Pulmonary/Chest: Effort normal and breath sounds normal.   Musculoskeletal: Normal range of motion.   Neurological: She is alert and oriented to person, place, and time.   Skin: Skin is warm.        Psychiatric: She has a normal mood and affect. Her behavior is normal.       Assessment:       1. Uncontrolled hypertension    2. Dermoid cyst        Plan:         Uncontrolled hypertension  Comments:  Start amlodipine 10 mg daily.  Continue other medications as prescribed.      Dermoid cyst  Comments:  Follow up with surgery as scheduled.   Orders:  -     Ambulatory Referral to General Surgery           Follow up with  PCP as scheduled.  Spoke with the staff in General Surgery.  She will call patient with an appointment once she look over their schedule.  Will have MA call and inform patient.

## 2019-10-08 NOTE — TELEPHONE ENCOUNTER
----- Message from Aniya Matta sent at 10/8/2019  9:29 AM CDT -----  ..Type:  Patient Returning Call    Who Called:pt   Who Left Message for Patient:  Does the patient know what this is regarding?: Referral   Would the patient rather a call back or a response via [employer]? Call back   Best Call Back Number [phone]  Additional Information: Pt states she went to the ER and the gave her a referral for a Derm but is unable to see one due to medicaid insurance.

## 2019-10-08 NOTE — TELEPHONE ENCOUNTER
Patient called stating that her blood pressure was 170/110 and she had been discharged from the hospital on Monday for an infected cyst. According to the hospital records the patient declined staying at the hospital for a complete I&D of her cyst. I advised the patient that she needs to have the cyst drained and treated with further antibiotics and that the infection is raising her blood pressure. The patient stated that she wanted to wait for a referral for a dermatologist to remove the cyst due to her having medicaid. I again advised the patient against waiting weeks to get this cyst removed and drained and that her blood pressure would continue to rise but the patient refused to go to the hospital or urgent care for treatment.

## 2019-10-08 NOTE — TELEPHONE ENCOUNTER
Spoke with patient. She said that she at the office seeing a NP right now. She will call back if needed.

## 2019-10-09 RX ORDER — VALACYCLOVIR HYDROCHLORIDE 1 G/1
1000 TABLET, FILM COATED ORAL 2 TIMES DAILY
Qty: 20 TABLET | Refills: 0 | Status: SHIPPED | OUTPATIENT
Start: 2019-10-09 | End: 2019-10-09 | Stop reason: CLARIF

## 2019-10-09 RX ORDER — VALACYCLOVIR HYDROCHLORIDE 1 G/1
1000 TABLET, FILM COATED ORAL 2 TIMES DAILY
Qty: 20 TABLET | Refills: 0 | Status: SHIPPED | OUTPATIENT
Start: 2019-10-09 | End: 2019-10-19

## 2019-10-10 ENCOUNTER — TELEPHONE (OUTPATIENT)
Dept: OTHER | Facility: OTHER | Age: 49
End: 2019-10-10

## 2019-10-10 NOTE — TELEPHONE ENCOUNTER
Patient has started amlodipine 10 mg daily dosing. Continue to monitor - allow 4 weeks tor each steady state.

## 2019-10-11 NOTE — PROGRESS NOTES
Digital Medicine: Clinician Follow-Up    The history is provided by the patient.     Follow Up  Follow-up reason(s): reading review      Readings are trending down   Patient started new medication.    Is patient tolerating med change?:  YesAmlodipine recently increased to 10 mg QD by primary care.     Patient states she remains with HA, no toehr symptoms of HTN endorsed. She is resting and taking it easy. Discussed adequate hydration and the need to adequate nutrition. She states she ate cottage cheese and fruit for dinner and oatmeal for breakfast. Patient educated briefly on need for protein source and maintaining a healthy diet. Will send chart to health  for further dietary follow up.       Depression  Yolanda Betts is in treatment for depression     Sleep Apnea  Patient previously diagnosed with ALEN She reports she is not currently using CPAP.     Medication Affordability  Patient is currently not having problems affording medications      INTERVENTION(S)  encouragement/support    PLAN  additional monitoring needed and Health  follow up    Will continue to monitor. BP remains uncontrolled but trending down.   CCB dose recently increased - monitor until ss achieved.     Next outreach scheduled in 2-4 weeks.         There are no preventive care reminders to display for this patient.    Last 5 Patient Entered Readings                                      Current 30 Day Average: 164/103     Recent Readings 10/10/2019 10/10/2019 10/9/2019 10/9/2019 10/8/2019    SBP (mmHg) 164 150 144 168 166    DBP (mmHg) 108 96 93 113 106    Pulse 91 95 93 113 118             Hypertension Medications             amLODIPine (NORVASC) 10 MG tablet Take 10 mg by mouth once daily.    cloNIDine (CATAPRES) 0.1 MG tablet Take 1 tablet (0.1 mg total) by mouth every 6 (six) hours as needed (for SBP>150 or DBP>100 mmHg).    metoprolol succinate (TOPROL-XL) 100 MG 24 hr tablet Take 1 tablet (100 mg total) by mouth once daily.     nitroGLYCERIN (NITROSTAT) 0.4 MG SL tablet Dissolve 1 tablet under the tongue every 5 minutes as needed, up to 3 times. If chest pain persists, call 911.    valsartan-hydrochlorothiazide (DIOVAN-HCT) 320-25 mg per tablet Take 1 tablet by mouth once daily.

## 2019-10-13 DIAGNOSIS — E11.42 DIABETIC POLYNEUROPATHY ASSOCIATED WITH TYPE 2 DIABETES MELLITUS: Chronic | ICD-10-CM

## 2019-10-14 ENCOUNTER — OFFICE VISIT (OUTPATIENT)
Dept: SURGERY | Facility: CLINIC | Age: 49
End: 2019-10-14
Payer: MEDICAID

## 2019-10-14 ENCOUNTER — TELEPHONE (OUTPATIENT)
Dept: INTERNAL MEDICINE | Facility: CLINIC | Age: 49
End: 2019-10-14

## 2019-10-14 ENCOUNTER — OFFICE VISIT (OUTPATIENT)
Dept: INTERNAL MEDICINE | Facility: CLINIC | Age: 49
End: 2019-10-14
Payer: MEDICAID

## 2019-10-14 ENCOUNTER — LAB VISIT (OUTPATIENT)
Dept: LAB | Facility: HOSPITAL | Age: 49
End: 2019-10-14
Attending: FAMILY MEDICINE
Payer: MEDICAID

## 2019-10-14 VITALS
HEIGHT: 56 IN | SYSTOLIC BLOOD PRESSURE: 125 MMHG | OXYGEN SATURATION: 92 % | BODY MASS INDEX: 45.27 KG/M2 | TEMPERATURE: 98 F | WEIGHT: 201.25 LBS | DIASTOLIC BLOOD PRESSURE: 86 MMHG | HEART RATE: 91 BPM

## 2019-10-14 VITALS
SYSTOLIC BLOOD PRESSURE: 133 MMHG | WEIGHT: 201 LBS | BODY MASS INDEX: 45.21 KG/M2 | DIASTOLIC BLOOD PRESSURE: 87 MMHG | HEIGHT: 56 IN | TEMPERATURE: 98 F | HEART RATE: 88 BPM

## 2019-10-14 DIAGNOSIS — R53.83 FATIGUE, UNSPECIFIED TYPE: ICD-10-CM

## 2019-10-14 DIAGNOSIS — L72.3 SEBACEOUS CYST: Primary | ICD-10-CM

## 2019-10-14 DIAGNOSIS — M25.50 POLYARTHRALGIA: ICD-10-CM

## 2019-10-14 DIAGNOSIS — M79.7 FIBROMYALGIA: Primary | Chronic | ICD-10-CM

## 2019-10-14 DIAGNOSIS — M79.10 MYALGIA: ICD-10-CM

## 2019-10-14 DIAGNOSIS — M79.7 FIBROMYALGIA: Chronic | ICD-10-CM

## 2019-10-14 DIAGNOSIS — E11.65 TYPE 2 DIABETES MELLITUS WITH HYPERGLYCEMIA, WITHOUT LONG-TERM CURRENT USE OF INSULIN: Chronic | ICD-10-CM

## 2019-10-14 DIAGNOSIS — E11.59 HYPERTENSION COMPLICATING DIABETES: ICD-10-CM

## 2019-10-14 DIAGNOSIS — I15.2 HYPERTENSION COMPLICATING DIABETES: ICD-10-CM

## 2019-10-14 DIAGNOSIS — L02.222 FURUNCLE OF BACK, EXCEPT BUTTOCK: ICD-10-CM

## 2019-10-14 LAB
CK SERPL-CCNC: 43 U/L (ref 20–180)
CRP SERPL-MCNC: 31.5 MG/L (ref 0–8.2)
ERYTHROCYTE [SEDIMENTATION RATE] IN BLOOD BY WESTERGREN METHOD: 67 MM/HR (ref 0–36)
ESTIMATED AVG GLUCOSE: 194 MG/DL (ref 68–131)
HBA1C MFR BLD HPLC: 8.4 % (ref 4–5.6)
RHEUMATOID FACT SERPL-ACNC: <10 IU/ML (ref 0–15)

## 2019-10-14 PROCEDURE — 86140 C-REACTIVE PROTEIN: CPT

## 2019-10-14 PROCEDURE — 99213 OFFICE O/P EST LOW 20 MIN: CPT | Mod: PBBFAC | Performed by: FAMILY MEDICINE

## 2019-10-14 PROCEDURE — 85652 RBC SED RATE AUTOMATED: CPT

## 2019-10-14 PROCEDURE — 86431 RHEUMATOID FACTOR QUANT: CPT

## 2019-10-14 PROCEDURE — 86038 ANTINUCLEAR ANTIBODIES: CPT

## 2019-10-14 PROCEDURE — 36415 COLL VENOUS BLD VENIPUNCTURE: CPT

## 2019-10-14 PROCEDURE — 99214 OFFICE O/P EST MOD 30 MIN: CPT | Mod: S$PBB,,, | Performed by: FAMILY MEDICINE

## 2019-10-14 PROCEDURE — 99999 PR PBB SHADOW E&M-EST. PATIENT-LVL III: ICD-10-PCS | Mod: PBBFAC,,, | Performed by: SURGERY

## 2019-10-14 PROCEDURE — 99201 PR OFFICE/OUTPT VISIT,NEW,LEVL I: CPT | Mod: S$PBB,,, | Performed by: SURGERY

## 2019-10-14 PROCEDURE — 99214 PR OFFICE/OUTPT VISIT, EST, LEVL IV, 30-39 MIN: ICD-10-PCS | Mod: S$PBB,,, | Performed by: FAMILY MEDICINE

## 2019-10-14 PROCEDURE — 99201 PR OFFICE/OUTPT VISIT,NEW,LEVL I: ICD-10-PCS | Mod: S$PBB,,, | Performed by: SURGERY

## 2019-10-14 PROCEDURE — 99999 PR PBB SHADOW E&M-EST. PATIENT-LVL III: CPT | Mod: PBBFAC,,, | Performed by: FAMILY MEDICINE

## 2019-10-14 PROCEDURE — 99999 PR PBB SHADOW E&M-EST. PATIENT-LVL III: CPT | Mod: PBBFAC,,, | Performed by: SURGERY

## 2019-10-14 PROCEDURE — 83036 HEMOGLOBIN GLYCOSYLATED A1C: CPT

## 2019-10-14 PROCEDURE — 99999 PR PBB SHADOW E&M-EST. PATIENT-LVL III: ICD-10-PCS | Mod: PBBFAC,,, | Performed by: FAMILY MEDICINE

## 2019-10-14 PROCEDURE — 82550 ASSAY OF CK (CPK): CPT

## 2019-10-14 PROCEDURE — 99213 OFFICE O/P EST LOW 20 MIN: CPT | Mod: PBBFAC,27 | Performed by: SURGERY

## 2019-10-14 RX ORDER — FLUCONAZOLE 150 MG/1
TABLET ORAL
Qty: 1 TABLET | Refills: 0 | OUTPATIENT
Start: 2019-10-14

## 2019-10-14 NOTE — TELEPHONE ENCOUNTER
Called and informed patient that Dr. 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 She verbalized understanding.

## 2019-10-14 NOTE — PATIENT INSTRUCTIONS
We will schedule appointment for you on Monday of next week to excised assistant close the area of

## 2019-10-14 NOTE — PATIENT INSTRUCTIONS
10/14/2019        TO WHOM IT MAY CONCERN:     RE:  Yolanda Martin Alpesh ,  1970     Yolanda was treated in my office 10/14/19.    She is estimated to be able to return to work without restrictions in 2 days.    Please extend to Yolanda all due courtesy and consideration and excuse her absence.    Sincerely,     DOMINIK Beach MD

## 2019-10-14 NOTE — ASSESSMENT & PLAN NOTE
Diabetes Management Status    Statin: Taking  ACE/ARB: Taking    Screening or Prevention Patient's value Goal Complete/Controlled?   HgA1C Testing and Control   Lab Results   Component Value Date    HGBA1C 7.7 (H) 06/24/2019      Annually/Less than 8% Yes   Lipid profile : 04/25/2019 Annually Yes   LDL control Lab Results   Component Value Date    LDLCALC 40.4 (L) 04/25/2019    Annually/Less than 100 mg/dl  Yes   Nephropathy screening Lab Results   Component Value Date    LABMICR 7.0 04/25/2019     Lab Results   Component Value Date    PROTEINUA Negative 08/26/2017    Annually Yes   Blood pressure BP Readings from Last 1 Encounters:   10/14/19 125/86    Less than 140/90 Yes   Dilated retinal exam : 05/22/2019 Annually Yes   Foot exam   : 05/16/2019 Annually Yes     Lab Results   Component Value Date    HGBA1C 7.7 (H) 06/24/2019    HGBA1C 8.3 (H) 12/22/2017    HGBA1C 8.5 (H) 01/31/2017    ESTGFRAFRICA >60 10/07/2019    EGFRNONAA >60 10/07/2019    MICALBCREAT 10.4 04/25/2019    LDLCALC 40.4 (L) 04/25/2019     Wt Readings from Last 3 Encounters:   10/14/19 91.3 kg (201 lb 4.5 oz)   10/08/19 93 kg (205 lb 0.4 oz)   10/07/19 93.5 kg (206 lb 0.3 oz)     Body mass index is 45.13 kg/m².    HEALTH MAINTENANCE: Diabetic health maintenance interventions reviewed and are up to date except for:  There are no preventive care reminders to display for this patient.

## 2019-10-14 NOTE — LETTER
October 14, 2019      DOMINIK Beach MD  [address]  [place] [address]           [facility]  [address] [place] [id number]  Phone: [phone]  Fax: [phone]          Patient: Yolanda Betts   MR Number: [id number]   YOB: 1970   Date of Visit: 10/14/2019       Dear Dr. DOMINIK Beach:    Thank you for referring Yolanda Betts to me for evaluation. Attached you will find relevant portions of my assessment and plan of care.    If you have questions, please do not hesitate to call me. I look forward to following Yolanda Betts along with you.    Sincerely,    Damir Driscoll MD    Enclosure  CC:  No Recipients    If you would like to receive this communication electronically, please contact [email or web address] or [phone] to request more information on [facility] access.    For providers and/or their staff who would like to refer a patient to [facility], please contact us through our one-stop-shop provider referral line, [facility], at [phone].    If you feel you have received this communication in error or would no longer like to receive these types of communications, please e-mail [email or web address]

## 2019-10-14 NOTE — PROGRESS NOTES
Subjective:       Patient ID: Yolanda Betts is a 49 y.o. female.    Chronic abscess right lateral back    Chief Complaint: Consult and Cyst    Patient has an area on the right lateral back that has been drained twice.  It is Is still causes her some soreness.  There is some nodularity to the area.    She presents now to discuss intervention    Review of Systems   Skin:        Area of in infection on the right lateral back that has been drained twice in the emergency room       Objective:      Physical Exam   Constitutional:   Obese   Skin:   The right lateral back along 1 of the skin flowed shows about a 4 cm area of induration and nodularity.  There is a small incision in this area   Vitals reviewed.      Assessment:      incompletely excise/drained abscess related to a cyst     Plan:       We will plan excision in clinic in 1 week.  Need for the procedure was discussed

## 2019-10-14 NOTE — PROGRESS NOTES
CHIEF COMPLAINT  Follow-up      HISTORY, ASSESSMENT, and PLAN    1. Fibromyalgia    2. Myalgia    3. Polyarthralgia    4. Fatigue, unspecified type        ASSESSMENT:  She describes worsening of these symptoms over last couple of weeks.  We discussed differential diagnosis.  It was agreed to evaluate with labs as ordered.  She has Rheumatology consultation scheduled in January.     5. Type 2 diabetes mellitus with hyperglycemia, without long-term current use of insulin        ASSESSMENT: Sub-Optimally Controlled and Worse based on her report of BG readings.      6. Painful residual scar tissue from furuncle of right upper back.        ASSESSMENT: Roughly 1.5 cm x 5 cm mildly tender subdermal nodule after reported prior I&D x 2.     7. Hypertension complicating diabetes        ASSESSMENT:  Controlled based on measurement in office today, but her home blood pressure readings are elevated.  I suspect that this is due to a cuff malfunction.  She agreed to bring her home blood pressure cuff with her to her next appointment for calibration check.         Orders Placed This Encounter    Hemoglobin A1c    CK    Sedimentation rate    C-reactive protein    Rheumatoid factor    ISAAC Screen w/Reflex    Ambulatory Referral to General Surgery       Problem List Items Addressed This Visit        Cardiac/Vascular    Hypertension complicating diabetes (Chronic)       Endocrine    Type 2 diabetes mellitus with hyperglycemia, without long-term current use of insulin (Chronic)    Current Assessment & Plan     Diabetes Management Status    Statin: Taking  ACE/ARB: Taking    Screening or Prevention Patient's value Goal Complete/Controlled?   HgA1C Testing and Control   Lab Results   Component Value Date    HGBA1C 7.7 (H) 06/24/2019      Annually/Less than 8% Yes   Lipid profile : 04/25/2019 Annually Yes   LDL control Lab Results   Component Value Date    LDLCALC 40.4 (L) 04/25/2019    Annually/Less than 100 mg/dl  Yes   Nephropathy  screening Lab Results   Component Value Date    LABMICR 7.0 04/25/2019     Lab Results   Component Value Date    PROTEINUA Negative 08/26/2017    Annually Yes   Blood pressure BP Readings from Last 1 Encounters:   10/14/19 125/86    Less than 140/90 Yes   Dilated retinal exam : 05/22/2019 Annually Yes   Foot exam   : 05/16/2019 Annually Yes     Lab Results   Component Value Date    HGBA1C 7.7 (H) 06/24/2019    HGBA1C 8.3 (H) 12/22/2017    HGBA1C 8.5 (H) 01/31/2017    ESTGFRAFRICA >60 10/07/2019    EGFRNONAA >60 10/07/2019    MICALBCREAT 10.4 04/25/2019    LDLCALC 40.4 (L) 04/25/2019     Wt Readings from Last 3 Encounters:   10/14/19 91.3 kg (201 lb 4.5 oz)   10/08/19 93 kg (205 lb 0.4 oz)   10/07/19 93.5 kg (206 lb 0.3 oz)     Body mass index is 45.13 kg/m².    HEALTH MAINTENANCE: Diabetic health maintenance interventions reviewed and are up to date except for:  There are no preventive care reminders to display for this patient.            Relevant Orders    Hemoglobin A1c       Orthopedic    Fibromyalgia - Primary (Chronic)    Relevant Orders    CK    Sedimentation rate    C-reactive protein      Other Visit Diagnoses     Myalgia        Relevant Orders    CK    Sedimentation rate    C-reactive protein    Rheumatoid factor    ISAAC Screen w/Reflex    Polyarthralgia        Relevant Orders    Sedimentation rate    C-reactive protein    Rheumatoid factor    ISAAC Screen w/Reflex    Fatigue, unspecified type        Painful residual scar tissue from furuncle of right upper back.        Relevant Orders    Ambulatory Referral to General Surgery           Outpatient Medications Prior to Visit   Medication Sig Dispense Refill    acyclovir (ZOVIRAX) 400 MG tablet Take 1 tablet (400 mg total) by mouth 3 (three) times daily. for 10 days 30 tablet 0    albuterol (PROVENTIL/VENTOLIN HFA) 90 mcg/actuation inhaler Inhale 2 puffs into the lungs every 4 (four) hours as needed. 8.5 g 5    ALPRAZolam (XANAX) 2 MG Tab Take 2 mg by mouth  "2 (two) times daily.       amLODIPine (NORVASC) 10 MG tablet Take 10 mg by mouth once daily.      aspirin (ECOTRIN) 81 MG EC tablet Take 81 mg by mouth once daily.      atorvastatin (LIPITOR) 20 MG tablet Take 1 tablet (20 mg total) by mouth every evening. 90 tablet 3    BD INSULIN SYRINGE ULTRA-FINE 1/2 mL 30 gauge x 1/2" Syrg   0    benztropine (COGENTIN) 1 MG tablet Take 1 mg by mouth nightly.  1    blood sugar diagnostic Strp Check blood glucose 3 times daily as directed and as needed (dispense insurance preferred brand or patient choice) 100 each 11    blood-glucose meter Misc use 3 times daily as directed 1 each 0    cloNIDine (CATAPRES) 0.1 MG tablet Take 1 tablet (0.1 mg total) by mouth every 6 (six) hours as needed (for SBP>150 or DBP>100 mmHg). 60 tablet 1    cycloSPORINE (RESTASIS) 0.05 % ophthalmic emulsion Place 0.4 mLs (1 drop total) into both eyes 2 (two) times daily. 60 each 11    duloxetine (CYMBALTA) 60 MG capsule Take 60 mg by mouth 2 (two) times daily.  2    empagliflozin (JARDIANCE) 10 mg Tab Take 1 tablet (10 mg) by mouth every morning. 30 tablet 5    ergocalciferol (ERGOCALCIFEROL) 50,000 unit Cap Take 1 capsule (50,000 Units total) by mouth every 14 (fourteen) days. 4 capsule 5    fish oil-omega-3 fatty acids 300-1,000 mg capsule Take 1 capsule by mouth once daily.      fluticasone propionate (FLONASE) 50 mcg/actuation nasal spray 2 sprays (100 mcg total) by Each Nare route once daily. 16 g 11    frovatriptan (FROVA) 2.5 MG tablet Take one tablet orally. If recurs, may repeat after 2 hours. Max of 3 tabs in 24 hours. 9 tablet 1    gabapentin (NEURONTIN) 300 MG capsule Take 2 capsules (600 mg total) by mouth 3 (three) times daily. (Patient taking differently: Take 300 mg by mouth 2 (two) times daily. ) 90 capsule 3    insulin aspart U-100 (NOVOLOG FLEXPEN U-100 INSULIN) 100 unit/mL (3 mL) InPn pen Inject before meals TID AC up to 30 units daily 15 mL 3    insulin detemir " "U-100 (LEVEMIR FLEXTOUCH) 100 unit/mL (3 mL) SubQ InPn pen Inject 76 Units into the skin every evening. 25 mL 3    lancets 28 gauge Misc use as directed 3 times daily 100 each 11    LATUDA 120 mg Tab       LINZESS 145 mcg Cap capsule Take 145 mcg by mouth as needed.   3    liraglutide 0.6 mg/0.1 mL, 18 mg/3 mL, subq PNIJ (VICTOZA 2-MILENA) 0.6 mg/0.1 mL (18 mg/3 mL) PnIj Use 1.8mg under the skin daily 9 mL 3    metFORMIN (GLUCOPHAGE) 1000 MG tablet TAKE 1 TABLET (1,000 MG TOTAL) BY MOUTH 2 (TWO) TIMES DAILY. 180 tablet 11    metoprolol succinate (TOPROL-XL) 100 MG 24 hr tablet Take 1 tablet (100 mg total) by mouth once daily. 30 tablet 5    mirtazapine (REMERON) 45 MG tablet Take 45 mg by mouth every evening.      mometasone (ASMANEX TWISTHALER) 220 mcg (120 doses) AePB Inhale 2 puffs into the lungs 2 (two) times daily. Controller 1 each 11    montelukast (SINGULAIR) 10 mg tablet Take 1 tablet (10 mg total) by mouth nightly. 30 tablet 11    MULTIVIT,CALC,MINS/IRON/FOLIC (DAILY MULTIPLE FOR WOMEN ORAL) Take 1 tablet by mouth once daily.      nitroGLYCERIN (NITROSTAT) 0.4 MG SL tablet Dissolve 1 tablet under the tongue every 5 minutes as needed, up to 3 times. If chest pain persists, call 911.      pen needle, diabetic (BD ULTRA-FINE MINI PEN NEEDLE) 31 gauge x 3/16" Ndle Use 5 a day 200 each 3    promethazine (PHENERGAN) 25 MG tablet Take 1 tablet (25 mg total) by mouth every 8 (eight) hours as needed for Nausea. sedating 14 tablet 0    traMADol (ULTRAM) 50 mg tablet Take 1 tablet (50 mg total) by mouth every 6 (six) hours as needed. 12 tablet 0    valACYclovir (VALTREX) 1000 MG tablet Take 1 tablet (1,000 mg total) by mouth once daily. 30 tablet 11    valACYclovir (VALTREX) 1000 MG tablet Take 1 tablet (1,000 mg total) by mouth 2 (two) times daily. for 10 days 20 tablet 0    valsartan-hydrochlorothiazide (DIOVAN-HCT) 320-25 mg per tablet Take 1 tablet by mouth once daily. 90 tablet 3    zolpidem " "(AMBIEN) 10 mg Tab Take 10 mg by mouth nightly as needed.       No facility-administered medications prior to visit.              There are no discontinued medications.     Follow up in about 2 weeks (around 10/28/2019) for review test results and discuss treatment plan, re-evaluate problem(s) discussed today.    REVIEW OF SYSTEMS  CARDIOVASCULAR: No angina or orthopnea report  ENDOCRINE: No symptomatic or otherwise significant hypoglycemia or hyperglycemia reported.   CONSTITUTIONAL: No fever or chills reported.     PHYSICAL EXAM  Vitals:    10/14/19 1032   BP: 125/86   BP Location: Right arm   Patient Position: Sitting   BP Method: Large (Manual)   Pulse: 91   Temp: 97.7 °F (36.5 °C)   TempSrc: Tympanic   SpO2: (!) 92%   Weight: 91.3 kg (201 lb 4.5 oz)   Height: 4' 8" (1.422 m)     CONSTITUTIONAL: Vital signs noted. No apparent distress. Does not appear acutely ill or septic. Appears adequately hydrated.  ENT: Oropharynx moist.  PULM: Breathing unlabored.  CV: Heart regular.  DERM: Skin normothermic. Description of relevant exam of this system is documented above in HPI.   PSYCHIATRIC: Alert and conversant. Grossly oriented. Mood is grossly neutral. Affect appropriate. Judgment and insight not grossly compromised.     TOTAL TIME evaluating and managing this patient for this encounter exceeded 25 minutes, the majority spent counseling and coordinating care for the listed diagnoses.   Documentation entered by me for this encounter may have been done in part using speech-recognition technology. Although I have made an effort to ensure accuracy, "sound like" errors may exist and should be interpreted in context. -DOMINIK Beach MD.   "

## 2019-10-15 LAB — ANA SER QL IF: NORMAL

## 2019-10-17 ENCOUNTER — TELEPHONE (OUTPATIENT)
Dept: INTERNAL MEDICINE | Facility: CLINIC | Age: 49
End: 2019-10-17

## 2019-10-17 ENCOUNTER — LAB VISIT (OUTPATIENT)
Dept: LAB | Facility: HOSPITAL | Age: 49
End: 2019-10-17
Attending: FAMILY MEDICINE
Payer: MEDICAID

## 2019-10-17 ENCOUNTER — CLINICAL SUPPORT (OUTPATIENT)
Dept: INTERNAL MEDICINE | Facility: CLINIC | Age: 49
End: 2019-10-17
Payer: MEDICAID

## 2019-10-17 VITALS — HEART RATE: 96 BPM | DIASTOLIC BLOOD PRESSURE: 100 MMHG | SYSTOLIC BLOOD PRESSURE: 148 MMHG

## 2019-10-17 DIAGNOSIS — M79.7 FIBROMYALGIA: Chronic | ICD-10-CM

## 2019-10-17 DIAGNOSIS — I1A.0 RESISTANT HYPERTENSION: Primary | ICD-10-CM

## 2019-10-17 DIAGNOSIS — I1A.0 RESISTANT HYPERTENSION: ICD-10-CM

## 2019-10-17 DIAGNOSIS — E66.2 OBESITY HYPOVENTILATION SYNDROME: ICD-10-CM

## 2019-10-17 DIAGNOSIS — E11.59 HYPERTENSION COMPLICATING DIABETES: Chronic | ICD-10-CM

## 2019-10-17 DIAGNOSIS — I15.2 HYPERTENSION COMPLICATING DIABETES: Chronic | ICD-10-CM

## 2019-10-17 DIAGNOSIS — E11.65 TYPE 2 DIABETES MELLITUS WITH HYPERGLYCEMIA, WITH LONG-TERM CURRENT USE OF INSULIN: Chronic | ICD-10-CM

## 2019-10-17 DIAGNOSIS — Z79.4 TYPE 2 DIABETES MELLITUS WITH HYPERGLYCEMIA, WITH LONG-TERM CURRENT USE OF INSULIN: Chronic | ICD-10-CM

## 2019-10-17 PROCEDURE — 99214 OFFICE O/P EST MOD 30 MIN: CPT | Mod: S$PBB,,, | Performed by: FAMILY MEDICINE

## 2019-10-17 PROCEDURE — 99214 PR OFFICE/OUTPT VISIT, EST, LEVL IV, 30-39 MIN: ICD-10-PCS | Mod: S$PBB,,, | Performed by: FAMILY MEDICINE

## 2019-10-17 PROCEDURE — 82088 ASSAY OF ALDOSTERONE: CPT

## 2019-10-17 PROCEDURE — 99999 PR PBB SHADOW E&M-EST. PATIENT-LVL IV: CPT | Mod: PBBFAC,,,

## 2019-10-17 PROCEDURE — 99214 OFFICE O/P EST MOD 30 MIN: CPT | Mod: PBBFAC

## 2019-10-17 PROCEDURE — 99999 PR PBB SHADOW E&M-EST. PATIENT-LVL IV: ICD-10-PCS | Mod: PBBFAC,,,

## 2019-10-17 PROCEDURE — 36415 COLL VENOUS BLD VENIPUNCTURE: CPT

## 2019-10-17 RX ORDER — PREDNISONE 20 MG/1
40 TABLET ORAL DAILY
Qty: 6 TABLET | Refills: 0 | Status: SHIPPED | OUTPATIENT
Start: 2019-10-17 | End: 2019-10-20

## 2019-10-17 NOTE — PROGRESS NOTES
CHIEF COMPLAINT  Hypertension    Nurse visit converted to physician visit due to uncontrolled blood pressure.    HISTORY, ASSESSMENT, and PLAN    1. Resistant hypertension    2. Hypertension complicating diabetes    3. Type 2 diabetes mellitus with hyperglycemia, with long-term current use of insulin    4. Fibromyalgia    5. Obesity hypoventilation syndrome        Orders Placed This Encounter    Aldosterone/Renin Activity Ratio    Ambulatory Referral to Diabetes Education    Ambulatory Referral to Cardiology    predniSONE (DELTASONE) 20 MG tablet       Problem List Items Addressed This Visit        Cardiac/Vascular    Hypertension complicating diabetes (Chronic)    Current Assessment & Plan     Asymptomatic, uncontrolled on Amlodpine, Valsartan/HCT, and Metoprolol, AND clonidine 0.1 TID.  PLAN: Cardiology consult.            Endocrine    Type 2 diabetes mellitus with hyperglycemia, with long-term current use of insulin (Chronic)    Current Assessment & Plan     BG typically high 200s on insulin detimir 76 units QHS and Novolog 10 units TID QAC, PLUS Victoza. No hypoglycemia.  PLAN: Change insulin detimir to 45 units injected 2 times daily (morning and evening).         Relevant Orders    Ambulatory Referral to Diabetes Education       Orthopedic    Fibromyalgia (Chronic)    Current Assessment & Plan     Lab Results   Component Value Date    CRP 31.5 (H) 10/14/2019    CRP 28.5 (H) 01/17/2017    SEDRATE 67 (H) 10/14/2019    SEDRATE 43 (H) 01/17/2017     Chronic, stable. We discussed differential diagnosis. We discussed risks and benefits of treatment options.     Future Appointments   Date Time Provider Department Center   1/10/2020 10:00 AM Abilio Gibbs MD [facility] [place]               Other    Obesity hypoventilation syndrome    Overview     Requires oxygen with sleep. Weight loss and exercise to improve overall health.             Other Visit Diagnoses     Resistant hypertension    -  Primary     "Relevant Orders    Aldosterone/Renin Activity Ratio (Completed)    Ambulatory Referral to Cardiology           Outpatient Medications Prior to Visit   Medication Sig Dispense Refill    acyclovir (ZOVIRAX) 400 MG tablet Take 1 tablet (400 mg total) by mouth 3 (three) times daily. for 10 days 30 tablet 0    albuterol (PROVENTIL/VENTOLIN HFA) 90 mcg/actuation inhaler Inhale 2 puffs into the lungs every 4 (four) hours as needed. 8.5 g 5    ALPRAZolam (XANAX) 2 MG Tab Take 2 mg by mouth 2 (two) times daily.       ALPRAZolam (XANAX) 2 MG Tab Take 1 tablet by mouth twice daily 60 tablet 0    amLODIPine (NORVASC) 10 MG tablet Take 10 mg by mouth once daily.      aspirin (ECOTRIN) 81 MG EC tablet Take 81 mg by mouth once daily.      atorvastatin (LIPITOR) 20 MG tablet Take 1 tablet (20 mg total) by mouth every evening. 90 tablet 3    BD INSULIN SYRINGE ULTRA-FINE 1/2 mL 30 gauge x 1/2" Syrg   0    benztropine (COGENTIN) 1 MG tablet Take 1 mg by mouth nightly.  1    benztropine (COGENTIN) 1 MG tablet Take 1 tablet by mouth at bedtime 30 tablet 0    blood sugar diagnostic Strp Check blood glucose 3 times daily as directed and as needed (dispense insurance preferred brand or patient choice) 100 each 11    blood-glucose meter Misc use 3 times daily as directed 1 each 0    cloNIDine (CATAPRES) 0.1 MG tablet Take 1 tablet (0.1 mg total) by mouth every 6 (six) hours as needed (for SBP>150 or DBP>100 mmHg). 60 tablet 1    cycloSPORINE (RESTASIS) 0.05 % ophthalmic emulsion Place 0.4 mLs (1 drop total) into both eyes 2 (two) times daily. 60 each 11    DULoxetine (CYMBALTA) 30 MG capsule Take 1 capsule by mouth twice daily 60 capsule 0    duloxetine (CYMBALTA) 60 MG capsule Take 60 mg by mouth 2 (two) times daily.  2    empagliflozin (JARDIANCE) 10 mg Tab Take 1 tablet (10 mg) by mouth every morning. 30 tablet 5    ergocalciferol (ERGOCALCIFEROL) 50,000 unit Cap Take 1 capsule (50,000 Units total) by mouth every 14 " (fourteen) days. 4 capsule 5    fish oil-omega-3 fatty acids 300-1,000 mg capsule Take 1 capsule by mouth once daily.      fluticasone propionate (FLONASE) 50 mcg/actuation nasal spray 2 sprays (100 mcg total) by Each Nare route once daily. 16 g 11    frovatriptan (FROVA) 2.5 MG tablet Take one tablet orally. If recurs, may repeat after 2 hours. Max of 3 tabs in 24 hours. 9 tablet 1    gabapentin (NEURONTIN) 300 MG capsule Take 2 capsules (600 mg total) by mouth 3 (three) times daily. (Patient taking differently: Take 300 mg by mouth 2 (two) times daily. ) 90 capsule 3    insulin aspart U-100 (NOVOLOG FLEXPEN U-100 INSULIN) 100 unit/mL (3 mL) InPn pen Inject before meals TID AC up to 30 units daily 15 mL 3    insulin detemir U-100 (LEVEMIR FLEXTOUCH) 100 unit/mL (3 mL) SubQ InPn pen Inject 76 Units into the skin every evening. 25 mL 3    lancets 28 gauge Misc use as directed 3 times daily 100 each 11    LATUDA 120 mg Tab       LINZESS 145 mcg Cap capsule Take 145 mcg by mouth as needed.   3    liraglutide 0.6 mg/0.1 mL, 18 mg/3 mL, subq PNIJ (VICTOZA 2-MILENA) 0.6 mg/0.1 mL (18 mg/3 mL) PnIj Use 1.8mg under the skin daily 9 mL 3    metFORMIN (GLUCOPHAGE) 1000 MG tablet TAKE 1 TABLET (1,000 MG TOTAL) BY MOUTH 2 (TWO) TIMES DAILY. 180 tablet 11    metoprolol succinate (TOPROL-XL) 100 MG 24 hr tablet Take 1 tablet (100 mg total) by mouth once daily. 30 tablet 5    mirtazapine (REMERON) 45 MG tablet Take 45 mg by mouth every evening.      mometasone (ASMANEX TWISTHALER) 220 mcg (120 doses) AePB Inhale 2 puffs into the lungs 2 (two) times daily. Controller 1 each 11    montelukast (SINGULAIR) 10 mg tablet Take 1 tablet (10 mg total) by mouth nightly. 30 tablet 11    MULTIVIT,CALC,MINS/IRON/FOLIC (DAILY MULTIPLE FOR WOMEN ORAL) Take 1 tablet by mouth once daily.      nitroGLYCERIN (NITROSTAT) 0.4 MG SL tablet Dissolve 1 tablet under the tongue every 5 minutes as needed, up to 3 times. If chest pain persists,  "call 911.      pen needle, diabetic (BD ULTRA-FINE MINI PEN NEEDLE) 31 gauge x 3/16" Ndle Use 5 a day 200 each 3    promethazine (PHENERGAN) 25 MG tablet Take 1 tablet (25 mg total) by mouth every 8 (eight) hours as needed for Nausea. sedating 14 tablet 0    traMADol (ULTRAM) 50 mg tablet Take 1 tablet (50 mg total) by mouth every 6 (six) hours as needed. 12 tablet 0    valACYclovir (VALTREX) 1000 MG tablet Take 1 tablet (1,000 mg total) by mouth once daily. 30 tablet 11    valACYclovir (VALTREX) 1000 MG tablet Take 1 tablet (1,000 mg total) by mouth 2 (two) times daily. for 10 days 20 tablet 0    valsartan-hydrochlorothiazide (DIOVAN-HCT) 320-25 mg per tablet Take 1 tablet by mouth once daily. 90 tablet 3    zolpidem (AMBIEN) 10 mg Tab Take 10 mg by mouth nightly as needed.      lurasidone (LATUDA) 120 mg Tab Take 1 tablet by mouth at 5 pm with food (Patient not taking: Reported on 10/30/2019) 30 tablet 0    mirtazapine (REMERON) 45 MG tablet Take 1 tablet by mouth at bedtime (Patient not taking: Reported on 10/30/2019) 30 tablet 0    zolpidem (AMBIEN) 10 mg Tab Take 1 tablet by mouth at bedtime 30 tablet 0     No facility-administered medications prior to visit.         Medications Ordered This Encounter   Medications    predniSONE (DELTASONE) 20 MG tablet     Sig: Take 2 tablets (40 mg total) by mouth once daily. - MONITOR BLOOD GLUCOSE CLOSELY for 3 days     Dispense:  6 tablet     Refill:  0       There are no discontinued medications.     Follow up in about 2 weeks (around 10/31/2019) for re-evaluate problem(s) discussed today.    REVIEW OF SYSTEMS  CARDIOVASCULAR: No angina or orthopnea reported.   ENDOCRINE: No symptomatic or otherwise significant hypoglycemia reported.  CONSTITUTIONAL: No fever or chills reported.     PHYSICAL EXAM  Vitals:    10/17/19 1643 10/17/19 1700 10/17/19 1702 10/17/19 1704   BP: (!) 128/92 120/78 (!) 146/88 (!) 148/100   BP Location: Left arm Right arm Right arm Right " "arm   Patient Position: Sitting Lying Sitting Standing   BP Method: Large (Manual)      Pulse:  92 95 96     CONSTITUTIONAL: Vital signs noted. No apparent distress. Does not appear acutely ill or septic. Appears adequately hydrated.  HEENT: External ENT grossly unremarkable. Hearing grossly intact. Oropharynx moist.  PULM: Lungs clear. Breathing unlabored.  HEART: Auscultation reveals regular rate and rhythm without murmur, gallop or rub.  DERM: Skin warm and moist with normal turgor.  NEURO: There are no gross focal motor deficits or gross deficits of cranial nerves III-XII.  PSYCHIATRIC: Alert and conversant. Mood grossly neutral. Judgment and insight not grossly compromised.     Documentation entered by me for this encounter may have been done in part using speech-recognition technology. Although I have made an effort to ensure accuracy, "sound like" errors may exist and should be interpreted in context. -DOMINIK Beach MD.   "

## 2019-10-17 NOTE — ASSESSMENT & PLAN NOTE
Lab Results   Component Value Date    CRP 31.5 (H) 10/14/2019    CRP 28.5 (H) 01/17/2017    SEDRATE 67 (H) 10/14/2019    SEDRATE 43 (H) 01/17/2017     Chronic, stable. We discussed differential diagnosis. We discussed risks and benefits of treatment options.     Future Appointments   Date Time Provider Department Center   1/10/2020 10:00 AM Abilio Gibbs MD [facility]

## 2019-10-17 NOTE — TELEPHONE ENCOUNTER
Spoke with patient and she said that she is feeling better now, however, last night her blood pressure was high and she has been having dizzy spells when sitting up from a lying position. I scheduled her a nurse visit for a blood pressure check and told her to bring her home monitor for me to check as well.

## 2019-10-18 ENCOUNTER — PATIENT OUTREACH (OUTPATIENT)
Dept: OTHER | Facility: OTHER | Age: 49
End: 2019-10-18

## 2019-10-18 DIAGNOSIS — I10 ESSENTIAL HYPERTENSION: Primary | ICD-10-CM

## 2019-10-18 NOTE — PROGRESS NOTES
Digital Medicine: Clinician Follow-Up    Patient endorses nausea, dizziness, and pain (from fibromyalgia).      The history is provided by the patient.     Follow Up  Follow-up reason(s): reading review      Readings are trending down   Routine Education Topics: weight management  Called patient to check in - BP continues downward trend. Much improved from previous - no high alert readings; however, remains uncontrolled and above goal. I question medication compliance and BP taking technique. Speaking to patient, she states Dr. Becah is questioning technique/cuff fit also. She is to bring digital cuff to her appointment on 10/28 for comparison.   Dr. Beach has also placed a referral to cardiology.    Unfortunately, it appears patient's BP is difficult to manage due to above mentioned compliance with lifestlye contributions and her underlying psychiatric conditions.     Considering interchange of metoprolol to carvedilol or possibly labetelol, but worried about frequency of dosing. Since consult was placed by PCP for cardiology, will await their input if scheduled soon. If not seeing patient soon, will consider change above as well as consider scheduled hydralazine - also worried about frequency of dosing with this option.    Continue to discuss lifestyle modifications that patient must improve to contribute to her health. DBP significantly and consistently elevated, likely due to sodium consumption, lack of exercise, and pain/anxiety.     Calcium elevated on last BMP, Yolanda is on a thiazide.             Sleep Apnea  Patient previously diagnosed with ALEN She reports she is not currently using CPAP.     Medication Affordability  Patient is currently not having problems affording medications      INTERVENTION(S)  reviewed appropriate dose schedule, recommended diet modifications, recommended physical activity, reviewed monitoring technique and encouragement/support    PLAN  patient verbalizes understanding,  additional monitoring needed and Health  follow up    Continue current regimen.     Repeat BMP prior to PCP visit due to elevated Ca while on thiazide.     Continue close monitoring - pending outcome of digital BP cuff accuracy on 10/28 and timliness of cardiology visit, will consider changes noted above.       There are no preventive care reminders to display for this patient.    Last 5 Patient Entered Readings                                      Current 30 Day Average: 161/102     Recent Readings 10/18/2019 10/17/2019 10/16/2019 10/16/2019 10/16/2019    SBP (mmHg) 147 154 168 145 142    DBP (mmHg) 95 102 104 94 85    Pulse 101 97 97 90 85             Hypertension Medications             amLODIPine (NORVASC) 10 MG tablet Take 10 mg by mouth once daily.    cloNIDine (CATAPRES) 0.1 MG tablet Take 1 tablet (0.1 mg total) by mouth every 6 (six) hours as needed (for SBP>150 or DBP>100 mmHg).    metoprolol succinate (TOPROL-XL) 100 MG 24 hr tablet Take 1 tablet (100 mg total) by mouth once daily.    nitroGLYCERIN (NITROSTAT) 0.4 MG SL tablet Dissolve 1 tablet under the tongue every 5 minutes as needed, up to 3 times. If chest pain persists, call 911.    valsartan-hydrochlorothiazide (DIOVAN-HCT) 320-25 mg per tablet Take 1 tablet by mouth once daily.

## 2019-10-21 ENCOUNTER — NURSE TRIAGE (OUTPATIENT)
Dept: ADMINISTRATIVE | Facility: CLINIC | Age: 49
End: 2019-10-21

## 2019-10-21 ENCOUNTER — PROCEDURE VISIT (OUTPATIENT)
Dept: SURGERY | Facility: CLINIC | Age: 49
End: 2019-10-21
Payer: MEDICAID

## 2019-10-21 VITALS
TEMPERATURE: 98 F | HEIGHT: 56 IN | DIASTOLIC BLOOD PRESSURE: 95 MMHG | SYSTOLIC BLOOD PRESSURE: 136 MMHG | HEART RATE: 95 BPM | WEIGHT: 201 LBS | BODY MASS INDEX: 45.21 KG/M2

## 2019-10-21 DIAGNOSIS — L72.3 SEBACEOUS CYST: Primary | ICD-10-CM

## 2019-10-21 DIAGNOSIS — L02.219 TRUNK ABSCESS: ICD-10-CM

## 2019-10-21 LAB
ALDOST SERPL-MCNC: 17.7 NG/DL
ALDOST/RENIN PLAS-RTO: 88.5 RATIO
RENIN PLAS-CCNC: 0.2 NG/ML/HR

## 2019-10-21 PROCEDURE — 10060 PR DRAIN SKIN ABSCESS SIMPLE: ICD-10-PCS | Mod: S$PBB,,, | Performed by: SURGERY

## 2019-10-21 PROCEDURE — 10060 I&D ABSCESS SIMPLE/SINGLE: CPT | Mod: S$PBB,,, | Performed by: SURGERY

## 2019-10-21 PROCEDURE — 10060 I&D ABSCESS SIMPLE/SINGLE: CPT | Mod: PBBFAC | Performed by: SURGERY

## 2019-10-21 RX ORDER — HYDROCODONE BITARTRATE AND ACETAMINOPHEN 7.5; 325 MG/1; MG/1
1 TABLET ORAL EVERY 6 HOURS PRN
Qty: 25 TABLET | Refills: 0 | Status: SHIPPED | OUTPATIENT
Start: 2019-10-21 | End: 2019-10-31

## 2019-10-21 NOTE — PROCEDURES
"Exc, Cyst  Date/Time: 10/21/2019 1:00 PM  Performed by: Damir Driscoll MD  Authorized by: Damir Driscoll MD     Consent Done?:  Yes (Written)  Time out: Immediately prior to procedure a "time out" was called to verify the correct patient, procedure, equipment, support staff and site/side marked as required.    INDICATIONS:abscess  LOCATION:  Body area:  Back / flankright    PREP:  Patient was prepped and draped in the normal sterile fashion.: Lateral.    ANESTHESIA:  Anesthesia:  Local infiltration  Local anesthetic:  Lidocaine 1% without epinephrine    PROCEDURE DETAILS:  Excision type:  Skin  Incision type:  Elliptical  Specimens?: No      Hemostasis was obtained.  Estimated blood loss (cc):  5  Dressings: The wound was packed with gauze.  Needle, instrument, and sponge counts were correct.  Patient tolerated the procedure well with no immediate complications.  Patient was discharged and will follow up for wound check.   As elliptical incision was made around the area of the sebaceous cyst a abscess cavity with purulent material was encountered.  The abscess was drained and the cyst fragments removed. The wound was irrigated.  Hemostasis achieved with silver nitrate.  The wound was packed      "

## 2019-10-21 NOTE — PATIENT INSTRUCTIONS
It is okay to shower and get the area wet.  Remove and replace the packing once or twice a day.    Please call for increasing pain, redness, drainage or swelling

## 2019-10-22 ENCOUNTER — HOSPITAL ENCOUNTER (EMERGENCY)
Facility: HOSPITAL | Age: 49
Discharge: HOME | End: 2019-10-22
Attending: EMERGENCY MEDICINE
Payer: MEDICAID

## 2019-10-22 ENCOUNTER — TELEPHONE (OUTPATIENT)
Dept: SURGERY | Facility: CLINIC | Age: 49
End: 2019-10-22

## 2019-10-22 ENCOUNTER — PATIENT MESSAGE (OUTPATIENT)
Dept: INTERNAL MEDICINE | Facility: CLINIC | Age: 49
End: 2019-10-22

## 2019-10-22 ENCOUNTER — PATIENT MESSAGE (OUTPATIENT)
Dept: ENDOCRINOLOGY | Facility: CLINIC | Age: 49
End: 2019-10-22

## 2019-10-22 VITALS
OXYGEN SATURATION: 96 % | BODY MASS INDEX: 45.68 KG/M2 | RESPIRATION RATE: 18 BRPM | TEMPERATURE: 99 F | WEIGHT: 203.06 LBS | HEART RATE: 98 BPM | SYSTOLIC BLOOD PRESSURE: 139 MMHG | HEIGHT: 56 IN | DIASTOLIC BLOOD PRESSURE: 86 MMHG

## 2019-10-22 DIAGNOSIS — E26.9 HYPERALDOSTERONISM: ICD-10-CM

## 2019-10-22 DIAGNOSIS — I10 ESSENTIAL HYPERTENSION: Primary | Chronic | ICD-10-CM

## 2019-10-22 DIAGNOSIS — G89.18 POSTOPERATIVE PAIN: Primary | ICD-10-CM

## 2019-10-22 PROCEDURE — 99283 EMERGENCY DEPT VISIT LOW MDM: CPT

## 2019-10-22 RX ORDER — SPIRONOLACTONE 25 MG/1
25 TABLET ORAL DAILY
Qty: 14 TABLET | Refills: 0 | Status: SHIPPED | OUTPATIENT
Start: 2019-10-22 | End: 2019-10-28

## 2019-10-22 RX ORDER — OXYCODONE AND ACETAMINOPHEN 10; 325 MG/1; MG/1
1 TABLET ORAL EVERY 8 HOURS PRN
Qty: 6 TABLET | Refills: 0 | Status: SHIPPED | OUTPATIENT
Start: 2019-10-22 | End: 2019-10-31

## 2019-10-22 NOTE — TELEPHONE ENCOUNTER
----- Message from Damir Driscoll MD sent at 10/22/2019 11:35 AM CDT -----  Contact: Patient  The patient's pain should resolve over time.  Tell her that she can take 2 of her pain pills every 6 hr for the next 2 days and see if that helps  ----- Message -----  From: Lorin Sanders MA  Sent: 10/22/2019  10:29 AM CDT  To: Damir Driscoll MD     Patient states that she is experiencing severe pain in her incision. It started last night. She rates it as 10 on the pain scale. States that she was advised to go to the Emergency Room last night but she decided to wait until we were in the office today. Please advise.  ----- Message -----  From: Kemi Mcgee  Sent: 10/22/2019   8:39 AM CDT  To: Americo Reich Staff    Patient had a procedure done on yesterday and is in severe pain today and would like the nurse to give her a call back at ph .[phone] (home) patient states it's urgent.

## 2019-10-22 NOTE — TELEPHONE ENCOUNTER
"Called patient to give her Dr Driscoll's recommendations which were to take 2 pain pills instead of 1 for the next 2 days. Patient states that the pain meds make her "high" and sick and do not relieve her pain  "

## 2019-10-22 NOTE — PROGRESS NOTES
"Yolanda Tate.     You appear to have a hormonal problem that is causing your high blood pressure. I'm ordering you a new blood pressure medicine called spironolactone. Add it to your current treatments.     It is important that you return to see me at your 10/28/2019 appointment and get your lab test (basic metabolic panel) done that morning before your appointment.     Want to learn more about your test results and what they mean? It's as simple as 1, 2, 3.     (1) Log in to your [facility] account at [email or web address]     (2) From the "View test results" tab, click on the test you want to know more about.     (3) Click on the "About This Test" link.     If you have any additional questions about your test results, be sure to write them down and we can discuss them face-to-face at your next appointment.  If you have any urgent questions in the meantime, please send me a message using [facility], or you can call my office at [phone].     Thanks for letting me care for you, thanks for trusting us with your healthcare needs, and thanks for using [facility].     Sincerely,     DOMINIK Beach MD"

## 2019-10-22 NOTE — ED PROVIDER NOTES
Encounter Date: 10/22/2019       History     Chief Complaint   Patient presents with    Post-op Problem     pt c/o pain to surgical site after surgical I&D of abscess to Rt side, pt states they gave her norco and it is making her high but not helping with pain, pt states that she called the surgeon and they told her to go to the ED for pain medications     Pt requesting a different pain medication for her post procedure pain.    The history is provided by the patient.   Illness    The current episode started yesterday. The problem has been unchanged. The pain is at a severity of 6/10. Nothing relieves the symptoms. The symptoms are aggravated by activity. Pertinent negatives include no fever, no photophobia, no abdominal pain, no diarrhea, no nausea, no headaches, no sore throat, no cough, no shortness of breath, no rash and no eye redness.     Review of patient's allergies indicates:   Allergen Reactions    Codeine Itching    Lortab [hydrocodone-acetaminophen] Itching    Neuromuscular blockers, steroidal      some    Latex, natural rubber Rash    Morphine Rash     itching    Seconal [secobarbital sodium] Rash     itching    Tylox [oxycodone-acetaminophen] Rash     Past Medical History:   Diagnosis Date    Abnormal Pap smear of cervix     HPV genital warts    Anemia     Anxiety     Arthritis     Asthma 10/11/2016    Bipolar 1 disorder     Diabetes mellitus, type 2     Dyslipidemia associated with type 2 diabetes mellitus 5/13/2019    Genital warts     GERD (gastroesophageal reflux disease)     Hypertension     Hypertension complicating diabetes 5/5/2019    Mild persistent asthma without complication 10/11/2016    Morbid obesity with body mass index (BMI) of 45.0 to 49.9 in adult 8/3/2017    Obstructive sleep apnea     Schizoaffective disorder, bipolar type 4/25/2019    Seasonal allergic rhinitis due to pollen 5/13/2019    Type 2 diabetes mellitus with hyperglycemia, with long-term current  use of insulin 2017    Type 2 diabetes mellitus with hyperglycemia, without long-term current use of insulin 2017     Past Surgical History:   Procedure Laterality Date    BELT ABDOMINOPLASTY  1996    BREAST SURGERY Bilateral 1996    Reduction     SECTION      X 2    COLONOSCOPY      COLONOSCOPY N/A 2018    Procedure: colonoscopy for iron deficiency anemia;  Surgeon: Frankie Sanchez MD;  Location: [facility] ENDO;  Service: Endoscopy;  Laterality: N/A;    COLONOSCOPY N/A 2018    Procedure: COLONOSCOPY;  Surgeon: Frankie Sanchez MD;  Location: [facility] ENDO;  Service: Endoscopy;  Laterality: N/A;    HYSTERECTOMY      w/ BSO; hypermenorrhea    MOUTH SURGERY      OOPHORECTOMY      hyst/bso, hypermenorrhea    TUBAL LIGATION       Family History   Problem Relation Age of Onset    Diabetes Mother     Hyperlipidemia Mother     Hypertension Mother     Asthma Mother     COPD Mother     Glaucoma Mother     Thyroid disease Mother     Hypertension Father     Hyperlipidemia Father     Cancer Father         Brain, lung, liver, kidney    Heart disease Maternal Grandmother     Hyperlipidemia Maternal Grandmother     Hypertension Maternal Grandmother     Cataracts Maternal Grandmother     Diabetes Maternal Grandmother     Heart disease Maternal Grandfather     Hyperlipidemia Maternal Grandfather     Hypertension Maternal Grandfather     Glaucoma Maternal Grandfather     Cancer Maternal Grandfather     Cataracts Maternal Grandfather     Macular degeneration Maternal Grandfather     Diabetes Maternal Grandfather     Heart disease Paternal Grandmother     Hyperlipidemia Paternal Grandmother     Hypertension Paternal Grandmother     Cataracts Paternal Grandmother     Heart disease Paternal Grandfather     Hyperlipidemia Paternal Grandfather     Hypertension Paternal Grandfather     Cataracts Paternal Grandfather     Breast cancer Maternal Cousin     Breast  cancer Maternal Cousin     Breast cancer Maternal Cousin     Breast cancer Maternal Cousin      Social History     Tobacco Use    Smoking status: Never Smoker    Smokeless tobacco: Never Used   Substance Use Topics    Alcohol use: Yes     Alcohol/week: 0.0 standard drinks     Comment: socially    Drug use: No     Review of Systems   Constitutional: Negative for chills and fever.   HENT: Negative for sore throat.    Eyes: Negative for photophobia and redness.   Respiratory: Negative for cough and shortness of breath.    Cardiovascular: Negative for chest pain.   Gastrointestinal: Negative for abdominal pain, diarrhea and nausea.   Endocrine: Negative for polydipsia and polyphagia.   Genitourinary: Negative for dysuria.   Musculoskeletal: Negative for arthralgias, back pain and myalgias.   Skin: Negative for rash.   Neurological: Negative for weakness and headaches.   Hematological: Does not bruise/bleed easily.   Psychiatric/Behavioral: The patient is not nervous/anxious.    All other systems reviewed and are negative.      Physical Exam     Initial Vitals [10/22/19 1218]   BP Pulse Resp Temp SpO2   139/86 98 18 98.7 °F (37.1 °C) 96 %      MAP       --         Physical Exam    Nursing note and vitals reviewed.  Constitutional: Vital signs are normal. She appears well-developed and well-nourished. No distress.   HENT:   Head: Normocephalic and atraumatic.   Right Ear: External ear normal.   Left Ear: External ear normal.   Nose: Nose normal.   Mouth/Throat: Oropharynx is clear and moist.   Eyes: Conjunctivae, EOM and lids are normal. Pupils are equal, round, and reactive to light.   Neck: Normal range of motion and full passive range of motion without pain. Neck supple.   Cardiovascular: Normal rate, regular rhythm, S1 normal, S2 normal, normal heart sounds, intact distal pulses and normal pulses.   Pulmonary/Chest: Breath sounds normal. No respiratory distress. She has no wheezes. She has no rales.   Abdominal:  Soft. Normal appearance and bowel sounds are normal. She exhibits no distension. There is no tenderness.   Musculoskeletal: Normal range of motion.   Lymphadenopathy:     She has no cervical adenopathy.   Neurological: She is alert and oriented to person, place, and time. She has normal strength. No cranial nerve deficit or sensory deficit. Coordination and gait normal.   Skin: Skin is warm, dry and intact.   Psychiatric: She has a normal mood and affect. Her speech is normal and behavior is normal. Judgment and thought content normal. Cognition and memory are normal.         ED Course   Procedures  Labs Reviewed - No data to display       Imaging Results    None                               Clinical Impression:       ICD-10-CM ICD-9-CM   1. Postoperative pain G89.18 338.18         Disposition:   Disposition: Discharged  Condition: Stable                        LINO Harrison  10/22/19 1246       LINO Harrison  10/22/19 1247

## 2019-10-22 NOTE — TELEPHONE ENCOUNTER
"Patient was called.  I would recommend adding Motrin orally but not Tylenol and taking 2 of the pain medicines at the same time to see if that helps.  She can do this for few days.  Has had a notify us on Thursday of this is not working        ----- Message from Lorin Sanders MA sent at 10/22/2019 12:08 PM CDT -----  Contact: Patient  Called patient with your recommendations and she request a call from you. She states that the pain medication makes her "high" and does not relieve her pain.  ----- Message -----  From: Damir Driscoll MD  Sent: 10/22/2019  11:35 AM CDT  To: Lorin Sanders MA    The patient's pain should resolve over time.  Tell her that she can take 2 of her pain pills every 6 hr for the next 2 days and see if that helps  ----- Message -----  From: Lorin Sanders MA  Sent: 10/22/2019  10:29 AM CDT  To: Damir Driscoll MD     Patient states that she is experiencing severe pain in her incision. It started last night. She rates it as 10 on the pain scale. States that she was advised to go to the Emergency Room last night but she decided to wait until we were in the office today. Please advise.  ----- Message -----  From: Kemi Mcgee  Sent: 10/22/2019   8:39 AM CDT  To: Americo Hair    Patient had a procedure done on yesterday and is in severe pain today and would like the nurse to give her a call back at  [phone] (home) patient states it's urgent.            "

## 2019-10-22 NOTE — TELEPHONE ENCOUNTER
Pt had surgery today and has been taking prn pain medication with no relief. Pt had abscess drained and packed. Patient rates pain 10 out of 10. Explained ot patient that per our policy we are not able to have pain medications called in and since the prn pain med she currently is taking is not helping then she would have to go to an ER for pain relief and call her MD in the morning and I will forward the call to her MD as well. Patient verbally understands.      Reason for Disposition   Health Information question, no triage required and triager able to answer question    Protocols used: INFORMATION ONLY CALL-A-AH

## 2019-10-22 NOTE — TELEPHONE ENCOUNTER
Patient states that she has severe pain and she rates it 10 on the pain scale. She was advised to go to the Emergency Room last night but decided that she would wait until today to call the office. Advised that I would send a message to Dr Driscoll and call her back with his recommendations.

## 2019-10-22 NOTE — TELEPHONE ENCOUNTER
----- Message from Kemi Mcgee sent at 10/22/2019  8:39 AM CDT -----  Contact: Patient  Patient had a procedure done on yesterday and is in severe pain today and would like the nurse to give her a call back at  [phone] (home) patient states it's urgent.

## 2019-10-22 NOTE — TELEPHONE ENCOUNTER
"ACTION NEEDED:  Please read Patient Portal Message (below), then verify her receipt and understanding. Thanks.         Yolanda Tate.    You appear to have a hormonal problem that is causing your high blood pressure. I'm ordering you a new blood pressure medicine called spironolactone. Add it to your current treatments.    It is important that you return to see me at your 10/28/2019 appointment and get your lab test (basic metabolic panel) done that morning before your appointment.    Want to learn more about your test results and what they mean? It's as simple as 1, 2, 3.     (1) Log in to your [facility] account at [email or web address]     (2) From the "View test results" tab, click on the test you want to know more about.     (3) Click on the "About This Test" link.    If you have any additional questions about your test results, be sure to write them down and we can discuss them face-to-face at your next appointment.  If you have any urgent questions in the meantime, please send me a message using [facility], or you can call my office at [phone].    Thanks for letting me care for you, thanks for trusting us with your healthcare needs, and thanks for using [facility].    Sincerely,    DOMINIK Beach MD     Medications Ordered This Encounter   Medications    spironolactone (ALDACTONE) 25 MG tablet     Sig: Take 1 tablet (25 mg total) by mouth once daily.     Dispense:  14 tablet     Refill:  0        Future Appointments   Date Time Provider Department Center   10/28/2019 10:55 AM [place] [facility] [place]   [place]/28/2019 11:20 AM DOMINIK Beach MD [facility]      "

## 2019-10-24 ENCOUNTER — TELEPHONE (OUTPATIENT)
Dept: SURGERY | Facility: CLINIC | Age: 49
End: 2019-10-24

## 2019-10-24 NOTE — TELEPHONE ENCOUNTER
Spoke to patient, she stated that she has a slimy drainage coming from her incision and waned to know if this was normal, the patient was advised per Dr. Driscoll that this is normal. The patient was also advised that if she starts to have signs of infection, or redness to contact the office, patient voiced udnerstanding.

## 2019-10-24 NOTE — TELEPHONE ENCOUNTER
----- Message from Ceci Lala sent at 10/24/2019 12:44 PM CDT -----  Contact: Pt  Pt is requesting call back in regards to wound being slimy and is currently in pain.          Pls call back at [phone]

## 2019-10-25 ENCOUNTER — TELEPHONE (OUTPATIENT)
Dept: SURGERY | Facility: CLINIC | Age: 49
End: 2019-10-25

## 2019-10-25 ENCOUNTER — PATIENT MESSAGE (OUTPATIENT)
Dept: SURGERY | Facility: CLINIC | Age: 49
End: 2019-10-25

## 2019-10-25 NOTE — TELEPHONE ENCOUNTER
----- Message from Michaela Jimenez sent at 10/25/2019  1:09 PM CDT -----  Contact: pt  .Type:  Needs Medical Advice    Who Called:  pt  Symptoms (please be specific):   Wound bed   How long has patient had these symptoms:  n/a  Pharmacy name and phone #:     Would the patient rather a call back or a response via [facility]? Call back   Best Call Back Number:  [phone] (home)     Additional Information:  Pt stated the wound bed is green and wants to know what she should do .

## 2019-10-25 NOTE — TELEPHONE ENCOUNTER
Spoke to patient she stated that she is having some green drainage from her wound, but does not have any redness or its not warm to touch,the patient was asked to send a picture through my chart, the patient stated that she will send it today.

## 2019-10-28 ENCOUNTER — LAB VISIT (OUTPATIENT)
Dept: LAB | Facility: HOSPITAL | Age: 49
End: 2019-10-28
Attending: FAMILY MEDICINE
Payer: MEDICAID

## 2019-10-28 ENCOUNTER — TELEPHONE (OUTPATIENT)
Dept: INTERNAL MEDICINE | Facility: CLINIC | Age: 49
End: 2019-10-28

## 2019-10-28 ENCOUNTER — OFFICE VISIT (OUTPATIENT)
Dept: INTERNAL MEDICINE | Facility: CLINIC | Age: 49
End: 2019-10-28
Payer: MEDICAID

## 2019-10-28 ENCOUNTER — TELEPHONE (OUTPATIENT)
Dept: CARDIOLOGY | Facility: CLINIC | Age: 49
End: 2019-10-28

## 2019-10-28 ENCOUNTER — TELEPHONE (OUTPATIENT)
Dept: SURGERY | Facility: CLINIC | Age: 49
End: 2019-10-28

## 2019-10-28 VITALS
BODY MASS INDEX: 44.68 KG/M2 | OXYGEN SATURATION: 95 % | HEART RATE: 105 BPM | DIASTOLIC BLOOD PRESSURE: 86 MMHG | SYSTOLIC BLOOD PRESSURE: 122 MMHG | HEIGHT: 56 IN | WEIGHT: 198.63 LBS | TEMPERATURE: 97 F

## 2019-10-28 DIAGNOSIS — E11.65 TYPE 2 DIABETES MELLITUS WITH HYPERGLYCEMIA, WITH LONG-TERM CURRENT USE OF INSULIN: Chronic | ICD-10-CM

## 2019-10-28 DIAGNOSIS — Z48.89 ENCOUNTER FOR POST SURGICAL WOUND CHECK: ICD-10-CM

## 2019-10-28 DIAGNOSIS — I10 ESSENTIAL HYPERTENSION: ICD-10-CM

## 2019-10-28 DIAGNOSIS — Z79.4 TYPE 2 DIABETES MELLITUS WITH HYPERGLYCEMIA, WITH LONG-TERM CURRENT USE OF INSULIN: Chronic | ICD-10-CM

## 2019-10-28 DIAGNOSIS — E26.9 HYPERALDOSTERONISM: ICD-10-CM

## 2019-10-28 DIAGNOSIS — I10 ESSENTIAL HYPERTENSION: Primary | Chronic | ICD-10-CM

## 2019-10-28 DIAGNOSIS — Z79.899 ENCOUNTER FOR LONG-TERM CURRENT USE OF MEDICATION: ICD-10-CM

## 2019-10-28 DIAGNOSIS — I10 ESSENTIAL HYPERTENSION: Chronic | ICD-10-CM

## 2019-10-28 LAB
ANION GAP SERPL CALC-SCNC: 15 MMOL/L (ref 8–16)
BUN SERPL-MCNC: 15 MG/DL (ref 6–20)
CALCIUM SERPL-MCNC: 10.8 MG/DL (ref 8.7–10.5)
CHLORIDE SERPL-SCNC: 96 MMOL/L (ref 95–110)
CO2 SERPL-SCNC: 26 MMOL/L (ref 23–29)
CREAT SERPL-MCNC: 0.8 MG/DL (ref 0.5–1.4)
EST. GFR  (AFRICAN AMERICAN): >60 ML/MIN/1.73 M^2
EST. GFR  (NON AFRICAN AMERICAN): >60 ML/MIN/1.73 M^2
GLUCOSE SERPL-MCNC: 219 MG/DL (ref 70–110)
POTASSIUM SERPL-SCNC: 4.3 MMOL/L (ref 3.5–5.1)
SODIUM SERPL-SCNC: 137 MMOL/L (ref 136–145)

## 2019-10-28 PROCEDURE — 36415 COLL VENOUS BLD VENIPUNCTURE: CPT

## 2019-10-28 PROCEDURE — 99214 PR OFFICE/OUTPT VISIT, EST, LEVL IV, 30-39 MIN: ICD-10-PCS | Mod: S$PBB,,, | Performed by: FAMILY MEDICINE

## 2019-10-28 PROCEDURE — 99999 PR PBB SHADOW E&M-EST. PATIENT-LVL IV: ICD-10-PCS | Mod: PBBFAC,,, | Performed by: FAMILY MEDICINE

## 2019-10-28 PROCEDURE — 99214 OFFICE O/P EST MOD 30 MIN: CPT | Mod: PBBFAC | Performed by: FAMILY MEDICINE

## 2019-10-28 PROCEDURE — 80048 BASIC METABOLIC PNL TOTAL CA: CPT

## 2019-10-28 PROCEDURE — 99214 OFFICE O/P EST MOD 30 MIN: CPT | Mod: S$PBB,,, | Performed by: FAMILY MEDICINE

## 2019-10-28 PROCEDURE — 99999 PR PBB SHADOW E&M-EST. PATIENT-LVL IV: CPT | Mod: PBBFAC,,, | Performed by: FAMILY MEDICINE

## 2019-10-28 NOTE — TELEPHONE ENCOUNTER
----- Message from Michaela Jimenez sent at 10/28/2019 10:31 AM CDT -----  Contact: pt  .Type:  Sooner Apoointment Request    Caller is requesting a sooner appointment.  Caller declined first available appointment listed below.  Caller will not accept being placed on the waitlist and is requesting a message be sent to doctor.  Name of Caller:pt  When is the first available appointment? 11/21  Symptoms: post op   Would the patient rather a call back or a response via [facility]?  Call back   Best Call Back Number: [phone] (home)   Additional Information:  Pt received a call to schedule post op appt for 10/31

## 2019-10-28 NOTE — PROGRESS NOTES
CHIEF COMPLAINT  Follow-up      HISTORY, ASSESSMENT, and PLAN    1. Essential hypertension        ASSESSMENT: Well Controlled. Stable.      2. Hyperaldosteronism        ASSESSMENT: We discussed differential diagnosis. We discussed risks and benefits of treatment options.     3. Type 2 diabetes mellitus with hyperglycemia, with long-term current use of insulin        ASSESSMENT: Uncontrolled. Worse. Therapeutic lifestyle changes encouraged. She will follow-up with endocrinology and/or diabetes clinic.     4. Encounter for post surgical wound check        ASSESSMENT: Wound looks healthy. Length = 5.0 cm. Depth = 1.3 cm. See accompanying clinical photo.      5. Encounter for long-term current use of medication        ASSESSMENT: She appears to be tolerating her current medications well and reports preceiving no adverse side-effects.          Orders Placed This Encounter    Ambulatory Referral to Cardiology       Problem List Items Addressed This Visit        Cardiac/Vascular    Essential hypertension - Primary (Chronic)    Current Assessment & Plan     BP Readings from Last 7 Encounters:   10/28/19 122/86   10/22/19 139/86   10/21/19 (!) 136/95   10/17/19 (!) 148/100   10/14/19 133/87   10/14/19 125/86   10/08/19 (!) 126/92             Relevant Orders    Ambulatory Referral to Cardiology       Endocrine    Type 2 diabetes mellitus with hyperglycemia, with long-term current use of insulin (Chronic)    Current Assessment & Plan     Diabetes Management Status    Statin: Taking  ACE/ARB: Taking    Screening or Prevention Patient's value Goal Complete/Controlled?   HgA1C Testing and Control   Lab Results   Component Value Date    HGBA1C 8.4 (H) 10/14/2019      Annually/Less than 8% No   Lipid profile : 04/25/2019 Annually Yes   LDL control Lab Results   Component Value Date    LDLCALC 40.4 (L) 04/25/2019    Annually/Less than 100 mg/dl  Yes   Nephropathy screening Lab Results   Component Value Date    LABMICR 7.0  "04/25/2019     Lab Results   Component Value Date    PROTEINUA Negative 08/26/2017    Annually Yes   Blood pressure BP Readings from Last 1 Encounters:   10/28/19 122/86    Less than 140/90 Yes   Dilated retinal exam : 05/22/2019 Annually Yes   Foot exam   : 05/16/2019 Annually Yes     Lab Results   Component Value Date    HGBA1C 8.4 (H) 10/14/2019    HGBA1C 7.7 (H) 06/24/2019    HGBA1C 8.3 (H) 12/22/2017    ESTGFRAFRICA >60 10/07/2019    EGFRNONAA >60 10/07/2019    MICALBCREAT 10.4 04/25/2019    LDLCALC 40.4 (L) 04/25/2019     Wt Readings from Last 3 Encounters:   10/28/19 90.1 kg (198 lb 10.2 oz)   10/22/19 92.1 kg (203 lb 0.7 oz)   10/21/19 91.2 kg (201 lb)     Body mass index is 44.53 kg/m².    HEALTH MAINTENANCE: Diabetic health maintenance interventions reviewed and are up to date except for:  There are no preventive care reminders to display for this patient.          Hyperaldosteronism    Relevant Orders    Ambulatory Referral to Cardiology      Other Visit Diagnoses     Encounter for post surgical wound check        Encounter for long-term current use of medication               Outpatient Medications Prior to Visit   Medication Sig Dispense Refill    albuterol (PROVENTIL/VENTOLIN HFA) 90 mcg/actuation inhaler Inhale 2 puffs into the lungs every 4 (four) hours as needed. 8.5 g 5    ALPRAZolam (XANAX) 2 MG Tab Take 2 mg by mouth 2 (two) times daily.       ALPRAZolam (XANAX) 2 MG Tab Take 1 tablet by mouth twice daily 60 tablet 0    amLODIPine (NORVASC) 10 MG tablet Take 10 mg by mouth once daily.      aspirin (ECOTRIN) 81 MG EC tablet Take 81 mg by mouth once daily.      atorvastatin (LIPITOR) 20 MG tablet Take 1 tablet (20 mg total) by mouth every evening. 90 tablet 3    BD INSULIN SYRINGE ULTRA-FINE 1/2 mL 30 gauge x 1/2" Syrg   0    benztropine (COGENTIN) 1 MG tablet Take 1 mg by mouth nightly.  1    benztropine (COGENTIN) 1 MG tablet Take 1 tablet by mouth at bedtime 30 tablet 0    blood sugar " diagnostic Strp Check blood glucose 3 times daily as directed and as needed (dispense insurance preferred brand or patient choice) 100 each 11    blood-glucose meter Misc use 3 times daily as directed 1 each 0    cloNIDine (CATAPRES) 0.1 MG tablet Take 1 tablet (0.1 mg total) by mouth every 6 (six) hours as needed (for SBP>150 or DBP>100 mmHg). 60 tablet 1    cycloSPORINE (RESTASIS) 0.05 % ophthalmic emulsion Place 0.4 mLs (1 drop total) into both eyes 2 (two) times daily. 60 each 11    DULoxetine (CYMBALTA) 30 MG capsule Take 1 capsule by mouth twice daily 60 capsule 0    duloxetine (CYMBALTA) 60 MG capsule Take 60 mg by mouth 2 (two) times daily.  2    empagliflozin (JARDIANCE) 10 mg Tab Take 1 tablet (10 mg) by mouth every morning. 30 tablet 5    ergocalciferol (ERGOCALCIFEROL) 50,000 unit Cap Take 1 capsule (50,000 Units total) by mouth every 14 (fourteen) days. 4 capsule 5    fish oil-omega-3 fatty acids 300-1,000 mg capsule Take 1 capsule by mouth once daily.      fluticasone propionate (FLONASE) 50 mcg/actuation nasal spray 2 sprays (100 mcg total) by Each Nare route once daily. 16 g 11    frovatriptan (FROVA) 2.5 MG tablet Take one tablet orally. If recurs, may repeat after 2 hours. Max of 3 tabs in 24 hours. 9 tablet 1    gabapentin (NEURONTIN) 300 MG capsule Take 2 capsules (600 mg total) by mouth 3 (three) times daily. (Patient taking differently: Take 300 mg by mouth 2 (two) times daily. ) 90 capsule 3    HYDROcodone-acetaminophen (NORCO) 7.5-325 mg per tablet Take 1 tablet by mouth every 6 (six) hours as needed for Pain. 25 tablet 0    insulin aspart U-100 (NOVOLOG FLEXPEN U-100 INSULIN) 100 unit/mL (3 mL) InPn pen Inject before meals TID AC up to 30 units daily 15 mL 3    insulin detemir U-100 (LEVEMIR FLEXTOUCH) 100 unit/mL (3 mL) SubQ InPn pen Inject 76 Units into the skin every evening. 25 mL 3    lancets 28 gauge Misc use as directed 3 times daily 100 each 11    LATUDA 120 mg Tab     "   LINZESS 145 mcg Cap capsule Take 145 mcg by mouth as needed.   3    liraglutide 0.6 mg/0.1 mL, 18 mg/3 mL, subq PNIJ (VICTOZA 2-MILENA) 0.6 mg/0.1 mL (18 mg/3 mL) PnIj Use 1.8mg under the skin daily 9 mL 3    lurasidone (LATUDA) 120 mg Tab Take 1 tablet by mouth at 5 pm with food 30 tablet 0    metFORMIN (GLUCOPHAGE) 1000 MG tablet TAKE 1 TABLET (1,000 MG TOTAL) BY MOUTH 2 (TWO) TIMES DAILY. 180 tablet 11    metoprolol succinate (TOPROL-XL) 100 MG 24 hr tablet Take 1 tablet (100 mg total) by mouth once daily. 30 tablet 5    mirtazapine (REMERON) 45 MG tablet Take 45 mg by mouth every evening.      mirtazapine (REMERON) 45 MG tablet Take 1 tablet by mouth at bedtime 30 tablet 0    mometasone (ASMANEX TWISTHALER) 220 mcg (120 doses) AePB Inhale 2 puffs into the lungs 2 (two) times daily. Controller 1 each 11    montelukast (SINGULAIR) 10 mg tablet Take 1 tablet (10 mg total) by mouth nightly. 30 tablet 11    MULTIVIT,CALC,MINS/IRON/FOLIC (DAILY MULTIPLE FOR WOMEN ORAL) Take 1 tablet by mouth once daily.      nitroGLYCERIN (NITROSTAT) 0.4 MG SL tablet Dissolve 1 tablet under the tongue every 5 minutes as needed, up to 3 times. If chest pain persists, call 911.      oxyCODONE-acetaminophen (PERCOCET)  mg per tablet Take 1 tablet by mouth every 8 (eight) hours as needed for Pain. 6 tablet 0    pen needle, diabetic (BD ULTRA-FINE MINI PEN NEEDLE) 31 gauge x 3/16" Ndle Use 5 a day 200 each 3    promethazine (PHENERGAN) 25 MG tablet Take 1 tablet (25 mg total) by mouth every 8 (eight) hours as needed for Nausea. sedating 14 tablet 0    spironolactone (ALDACTONE) 25 MG tablet Take 1 tablet (25 mg total) by mouth once daily. 14 tablet 0    traMADol (ULTRAM) 50 mg tablet Take 1 tablet (50 mg total) by mouth every 6 (six) hours as needed. 12 tablet 0    valACYclovir (VALTREX) 1000 MG tablet Take 1 tablet (1,000 mg total) by mouth once daily. 30 tablet 11    valsartan-hydrochlorothiazide (DIOVAN-HCT) " "320-25 mg per tablet Take 1 tablet by mouth once daily. 90 tablet 3    zolpidem (AMBIEN) 10 mg Tab Take 10 mg by mouth nightly as needed.      zolpidem (AMBIEN) 10 mg Tab Take 1 tablet by mouth at bedtime 30 tablet 0    acyclovir (ZOVIRAX) 400 MG tablet Take 1 tablet (400 mg total) by mouth 3 (three) times daily. for 10 days 30 tablet 0     No facility-administered medications prior to visit.              There are no discontinued medications.     Follow up in about 3 months (around 1/28/2020) for re-evaluate problem(s) discussed today.    REVIEW OF SYSTEMS  ENDOCRINE: No polyuria or polydipsia reported. No symptomatic or otherwise significant hypoglycemia or hyperglycemia reported.  CARDIOVASCULAR: No angina or orthopnea reported.     PHYSICAL EXAM  Vitals:    10/28/19 1137   BP: 122/86   Pulse: 105   Temp: 97 °F (36.1 °C)   TempSrc: Tympanic   SpO2: 95%   Weight: 90.1 kg (198 lb 10.2 oz)   Height: 4' 8" (1.422 m)     CONSTITUTIONAL: Vital signs noted. No apparent distress. Does not appear acutely ill or septic. Appears adequately hydrated.  ENT: Oropharynx moist.  PULM: Breathing unlabored.  CV: Heart regular.  DERM: Skin normothermic. See accompanying clinical photo.   PSYCHIATRIC: Alert and conversant. Grossly oriented. Mood is grossly neutral. Affect appropriate. Judgment and insight not grossly compromised.        Documentation entered by me for this encounter may have been done in part using speech-recognition technology. Although I have made an effort to ensure accuracy, "sound like" errors may exist and should be interpreted in context. -DOMINIK Beach MD.   "

## 2019-10-28 NOTE — TELEPHONE ENCOUNTER
Scheduled 11/20/19 for 345PM and telephoned patient to confirm and she immediately stated she needed to be seen sooner than that due to her blood pressure.  Rescheduled with NMay NP for Monday 11/4/19 for 11am        ----- Message from Ignacia Mcgee sent at 10/28/2019 12:20 PM CDT -----  Contact: Patient was seen by Dr Beach in Internal Medicine on 10/28/2019  Good Afternoon,     Mrs Betts was seen today by Dr Beach and he would like for her to see Dr Mccormick  within 4-6 weeks.    Patient would like to be contacted to schedule appointment at [phone].    Thank you so much,  Ignacia Mcgee  Internal Medicine  [facility] Care Coordinator   [facility]

## 2019-10-28 NOTE — TELEPHONE ENCOUNTER
Contacted patient to inform that she has an follow up appointment with Kayla Severino on 11/11/2019@1:00pm.[place]

## 2019-10-28 NOTE — ASSESSMENT & PLAN NOTE
BP Readings from Last 7 Encounters:   10/28/19 122/86   10/22/19 139/86   10/21/19 (!) 136/95   10/17/19 (!) 148/100   10/14/19 133/87   10/14/19 125/86   10/08/19 (!) 126/92

## 2019-10-28 NOTE — ASSESSMENT & PLAN NOTE
Diabetes Management Status    Statin: Taking  ACE/ARB: Taking    Screening or Prevention Patient's value Goal Complete/Controlled?   HgA1C Testing and Control   Lab Results   Component Value Date    HGBA1C 8.4 (H) 10/14/2019      Annually/Less than 8% No   Lipid profile : 04/25/2019 Annually Yes   LDL control Lab Results   Component Value Date    LDLCALC 40.4 (L) 04/25/2019    Annually/Less than 100 mg/dl  Yes   Nephropathy screening Lab Results   Component Value Date    LABMICR 7.0 04/25/2019     Lab Results   Component Value Date    PROTEINUA Negative 08/26/2017    Annually Yes   Blood pressure BP Readings from Last 1 Encounters:   10/28/19 122/86    Less than 140/90 Yes   Dilated retinal exam : 05/22/2019 Annually Yes   Foot exam   : 05/16/2019 Annually Yes     Lab Results   Component Value Date    HGBA1C 8.4 (H) 10/14/2019    HGBA1C 7.7 (H) 06/24/2019    HGBA1C 8.3 (H) 12/22/2017    ESTGFRAFRICA >60 10/07/2019    EGFRNONAA >60 10/07/2019    MICALBCREAT 10.4 04/25/2019    LDLCALC 40.4 (L) 04/25/2019     Wt Readings from Last 3 Encounters:   10/28/19 90.1 kg (198 lb 10.2 oz)   10/22/19 92.1 kg (203 lb 0.7 oz)   10/21/19 91.2 kg (201 lb)     Body mass index is 44.53 kg/m².    HEALTH MAINTENANCE: Diabetic health maintenance interventions reviewed and are up to date except for:  There are no preventive care reminders to display for this patient.

## 2019-10-29 ENCOUNTER — PATIENT OUTREACH (OUTPATIENT)
Dept: OTHER | Facility: OTHER | Age: 49
End: 2019-10-29

## 2019-10-29 NOTE — PROGRESS NOTES
Called patient to review recent BMP results. Calcium elevated - on HCTZ.   Patient did not answer, left  requesting patient call me back.     Consider discontinuation of HCTZ - patient sees cardiology next week.

## 2019-10-30 ENCOUNTER — OFFICE VISIT (OUTPATIENT)
Dept: OBSTETRICS AND GYNECOLOGY | Facility: CLINIC | Age: 49
End: 2019-10-30
Payer: MEDICAID

## 2019-10-30 VITALS
WEIGHT: 203.94 LBS | HEIGHT: 56 IN | BODY MASS INDEX: 45.88 KG/M2 | SYSTOLIC BLOOD PRESSURE: 130 MMHG | DIASTOLIC BLOOD PRESSURE: 76 MMHG

## 2019-10-30 DIAGNOSIS — Z01.419 ENCOUNTER FOR GYNECOLOGICAL EXAMINATION WITHOUT ABNORMAL FINDING: Primary | ICD-10-CM

## 2019-10-30 DIAGNOSIS — R14.0 ABDOMINAL DISTENSION: ICD-10-CM

## 2019-10-30 PROCEDURE — 99396 PREV VISIT EST AGE 40-64: CPT | Mod: S$PBB,,, | Performed by: OBSTETRICS & GYNECOLOGY

## 2019-10-30 PROCEDURE — 99999 PR PBB SHADOW E&M-EST. PATIENT-LVL V: CPT | Mod: PBBFAC,,, | Performed by: OBSTETRICS & GYNECOLOGY

## 2019-10-30 PROCEDURE — 99215 OFFICE O/P EST HI 40 MIN: CPT | Mod: PBBFAC | Performed by: OBSTETRICS & GYNECOLOGY

## 2019-10-30 PROCEDURE — 99396 PR PREVENTIVE VISIT,EST,40-64: ICD-10-PCS | Mod: S$PBB,,, | Performed by: OBSTETRICS & GYNECOLOGY

## 2019-10-30 PROCEDURE — 99999 PR PBB SHADOW E&M-EST. PATIENT-LVL V: ICD-10-PCS | Mod: PBBFAC,,, | Performed by: OBSTETRICS & GYNECOLOGY

## 2019-10-30 RX ORDER — NYSTATIN AND TRIAMCINOLONE ACETONIDE 100000; 1 [USP'U]/G; MG/G
CREAM TOPICAL
Qty: 30 G | Refills: 1 | Status: SHIPPED | OUTPATIENT
Start: 2019-10-30 | End: 2019-12-09

## 2019-10-30 RX ORDER — SPIRONOLACTONE 25 MG/1
25 TABLET ORAL DAILY
Qty: 14 TABLET | Refills: 0 | Status: SHIPPED | OUTPATIENT
Start: 2019-10-30 | End: 2019-11-18

## 2019-10-30 RX ORDER — FLUCONAZOLE 150 MG/1
150 TABLET ORAL DAILY
Qty: 1 TABLET | Refills: 0 | Status: SHIPPED | OUTPATIENT
Start: 2019-10-30 | End: 2019-10-31

## 2019-10-30 NOTE — PROGRESS NOTES
CC: Well woman exam    Yolanda Betts is a 49 y.o. female  presents for a well woman exam.  Still reports vulvar irritation x 3 weeks despite being on valtrex & acyclovir. Had relief w/ mycolog but only used for few days. Pt reports not able to exercise/work bc of somnolence she believes is due spironolactone; has elevated aldosterone levels. DM & HTN. Reports compliant w/ diet, normal appetite; no N/V or anorexia but has noticed abdominal distension since this year    Past Medical History:   Diagnosis Date    Abnormal Pap smear of cervix     HPV genital warts    Anemia     Anxiety     Arthritis     Asthma 10/11/2016    Bipolar 1 disorder     Diabetes mellitus, type 2     Dyslipidemia associated with type 2 diabetes mellitus 2019    Genital warts     GERD (gastroesophageal reflux disease)     Herpes simplex virus (HSV) infection     Hypertension     Hypertension complicating diabetes 2019    Mild persistent asthma without complication 10/11/2016    Morbid obesity with body mass index (BMI) of 45.0 to 49.9 in adult 8/3/2017    Obstructive sleep apnea     Schizoaffective disorder, bipolar type 2019    Seasonal allergic rhinitis due to pollen 2019    Type 2 diabetes mellitus with hyperglycemia, with long-term current use of insulin 2017    Type 2 diabetes mellitus with hyperglycemia, without long-term current use of insulin 2017     Past Surgical History:   Procedure Laterality Date    BELT ABDOMINOPLASTY      BREAST SURGERY Bilateral     Reduction     SECTION      X 2    COLONOSCOPY      COLONOSCOPY N/A 2018    Procedure: colonoscopy for iron deficiency anemia;  Surgeon: Frankie Sanchez MD;  Location: [facility];  Service: Endoscopy;  Laterality: N/A;    COLONOSCOPY N/A 2018    Procedure: COLONOSCOPY;  Surgeon: Frankie Sanchez MD;  Location: [facility];  Service: Endoscopy;  Laterality: N/A;    HYSTERECTOMY      w/  BSO; hypermenorrhea    MOUTH SURGERY      OOPHORECTOMY      hyst/bso, hypermenorrhea    TUBAL LIGATION       Family History   Problem Relation Age of Onset    Diabetes Mother     Hyperlipidemia Mother     Hypertension Mother     Asthma Mother     COPD Mother     Glaucoma Mother     Thyroid disease Mother     Hypertension Father     Hyperlipidemia Father     Cancer Father         Brain, lung, liver, kidney    Heart disease Maternal Grandmother     Hyperlipidemia Maternal Grandmother     Hypertension Maternal Grandmother     Cataracts Maternal Grandmother     Diabetes Maternal Grandmother     Heart disease Maternal Grandfather     Hyperlipidemia Maternal Grandfather     Hypertension Maternal Grandfather     Glaucoma Maternal Grandfather     Cancer Maternal Grandfather     Cataracts Maternal Grandfather     Macular degeneration Maternal Grandfather     Diabetes Maternal Grandfather     Heart disease Paternal Grandmother     Hyperlipidemia Paternal Grandmother     Hypertension Paternal Grandmother     Cataracts Paternal Grandmother     Heart disease Paternal Grandfather     Hyperlipidemia Paternal Grandfather     Hypertension Paternal Grandfather     Cataracts Paternal Grandfather     Breast cancer Maternal Cousin     Breast cancer Maternal Cousin     Breast cancer Maternal Cousin     Breast cancer Maternal Cousin      Social History     Tobacco Use    Smoking status: Never Smoker    Smokeless tobacco: Never Used   Substance Use Topics    Alcohol use: Yes     Alcohol/week: 0.0 standard drinks     Comment: socially    Drug use: No     OB History        2    Para   2    Term           2    AB        Living   2       SAB        TAB        Ectopic        Multiple        Live Births   2                 Current Outpatient Medications:     acyclovir (ZOVIRAX) 400 MG tablet, Take 1 tablet (400 mg total) by mouth 3 (three) times daily. for 10 days, Disp: 30 tablet,  "Rfl: 0    albuterol (PROVENTIL/VENTOLIN HFA) 90 mcg/actuation inhaler, Inhale 2 puffs into the lungs every 4 (four) hours as needed., Disp: 8.5 g, Rfl: 5    ALPRAZolam (XANAX) 2 MG Tab, Take 1 tablet by mouth twice daily, Disp: 60 tablet, Rfl: 0    amLODIPine (NORVASC) 10 MG tablet, Take 10 mg by mouth once daily., Disp: , Rfl:     aspirin (ECOTRIN) 81 MG EC tablet, Take 81 mg by mouth once daily., Disp: , Rfl:     atorvastatin (LIPITOR) 20 MG tablet, Take 1 tablet (20 mg total) by mouth every evening., Disp: 90 tablet, Rfl: 3    BD INSULIN SYRINGE ULTRA-FINE 1/2 mL 30 gauge x 1/2" Syrg, , Disp: , Rfl: 0    benztropine (COGENTIN) 1 MG tablet, Take 1 tablet by mouth at bedtime, Disp: 30 tablet, Rfl: 0    blood sugar diagnostic Strp, Check blood glucose 3 times daily as directed and as needed (dispense insurance preferred brand or patient choice), Disp: 100 each, Rfl: 11    blood-glucose meter Misc, use 3 times daily as directed, Disp: 1 each, Rfl: 0    cloNIDine (CATAPRES) 0.1 MG tablet, Take 1 tablet (0.1 mg total) by mouth every 6 (six) hours as needed (for SBP>150 or DBP>100 mmHg)., Disp: 60 tablet, Rfl: 1    cycloSPORINE (RESTASIS) 0.05 % ophthalmic emulsion, Place 0.4 mLs (1 drop total) into both eyes 2 (two) times daily., Disp: 60 each, Rfl: 11    DULoxetine (CYMBALTA) 30 MG capsule, Take 1 capsule by mouth twice daily, Disp: 60 capsule, Rfl: 0    empagliflozin (JARDIANCE) 10 mg Tab, Take 1 tablet (10 mg) by mouth every morning., Disp: 30 tablet, Rfl: 5    ergocalciferol (ERGOCALCIFEROL) 50,000 unit Cap, Take 1 capsule (50,000 Units total) by mouth every 14 (fourteen) days., Disp: 4 capsule, Rfl: 5    fish oil-omega-3 fatty acids 300-1,000 mg capsule, Take 1 capsule by mouth once daily., Disp: , Rfl:     fluticasone propionate (FLONASE) 50 mcg/actuation nasal spray, 2 sprays (100 mcg total) by Each Nare route once daily., Disp: 16 g, Rfl: 11    frovatriptan (FROVA) 2.5 MG tablet, Take one " "tablet orally. If recurs, may repeat after 2 hours. Max of 3 tabs in 24 hours., Disp: 9 tablet, Rfl: 1    gabapentin (NEURONTIN) 300 MG capsule, Take 2 capsules (600 mg total) by mouth 3 (three) times daily. (Patient taking differently: Take 300 mg by mouth 2 (two) times daily. ), Disp: 90 capsule, Rfl: 3    insulin aspart U-100 (NOVOLOG FLEXPEN U-100 INSULIN) 100 unit/mL (3 mL) InPn pen, Inject before meals TID AC up to 30 units daily, Disp: 15 mL, Rfl: 3    insulin detemir U-100 (LEVEMIR FLEXTOUCH) 100 unit/mL (3 mL) SubQ InPn pen, Inject 76 Units into the skin every evening., Disp: 25 mL, Rfl: 3    lancets 28 gauge Misc, use as directed 3 times daily, Disp: 100 each, Rfl: 11    LATUDA 120 mg Tab, , Disp: , Rfl:     LINZESS 145 mcg Cap capsule, Take 145 mcg by mouth as needed. , Disp: , Rfl: 3    liraglutide 0.6 mg/0.1 mL, 18 mg/3 mL, subq PNIJ (VICTOZA 2-MILENA) 0.6 mg/0.1 mL (18 mg/3 mL) PnIj, Use 1.8mg under the skin daily, Disp: 9 mL, Rfl: 3    metFORMIN (GLUCOPHAGE) 1000 MG tablet, TAKE 1 TABLET (1,000 MG TOTAL) BY MOUTH 2 (TWO) TIMES DAILY., Disp: 180 tablet, Rfl: 11    metoprolol succinate (TOPROL-XL) 100 MG 24 hr tablet, Take 1 tablet (100 mg total) by mouth once daily., Disp: 30 tablet, Rfl: 5    mirtazapine (REMERON) 45 MG tablet, Take 45 mg by mouth every evening., Disp: , Rfl:     mometasone (ASMANEX TWISTHALER) 220 mcg (120 doses) AePB, Inhale 2 puffs into the lungs 2 (two) times daily. Controller, Disp: 1 each, Rfl: 11    montelukast (SINGULAIR) 10 mg tablet, Take 1 tablet (10 mg total) by mouth nightly., Disp: 30 tablet, Rfl: 11    MULTIVIT,CALC,MINS/IRON/FOLIC (DAILY MULTIPLE FOR WOMEN ORAL), Take 1 tablet by mouth once daily., Disp: , Rfl:     nitroGLYCERIN (NITROSTAT) 0.4 MG SL tablet, Dissolve 1 tablet under the tongue every 5 minutes as needed, up to 3 times. If chest pain persists, call 911., Disp: , Rfl:     pen needle, diabetic (BD ULTRA-FINE MINI PEN NEEDLE) 31 gauge x 3/16" " Ndle, Use 5 a day, Disp: 200 each, Rfl: 3    promethazine (PHENERGAN) 25 MG tablet, Take 1 tablet (25 mg total) by mouth every 8 (eight) hours as needed for Nausea. sedating, Disp: 14 tablet, Rfl: 0    spironolactone (ALDACTONE) 25 MG tablet, Take 1 tablet (25 mg total) by mouth once daily., Disp: 14 tablet, Rfl: 0    traMADol (ULTRAM) 50 mg tablet, Take 1 tablet (50 mg total) by mouth every 6 (six) hours as needed., Disp: 12 tablet, Rfl: 0    valACYclovir (VALTREX) 1000 MG tablet, Take 1 tablet (1,000 mg total) by mouth once daily., Disp: 30 tablet, Rfl: 11    valsartan-hydrochlorothiazide (DIOVAN-HCT) 320-25 mg per tablet, Take 1 tablet by mouth once daily., Disp: 90 tablet, Rfl: 3    zolpidem (AMBIEN) 10 mg Tab, Take 10 mg by mouth nightly as needed., Disp: , Rfl:     ALPRAZolam (XANAX) 2 MG Tab, Take 2 mg by mouth 2 (two) times daily. , Disp: , Rfl:     benztropine (COGENTIN) 1 MG tablet, Take 1 mg by mouth nightly., Disp: , Rfl: 1    duloxetine (CYMBALTA) 60 MG capsule, Take 60 mg by mouth 2 (two) times daily., Disp: , Rfl: 2    fluconazole (DIFLUCAN) 150 MG Tab, Take 1 tablet (150 mg total) by mouth once daily. for 1 day, Disp: 1 tablet, Rfl: 0    HYDROcodone-acetaminophen (NORCO) 7.5-325 mg per tablet, Take 1 tablet by mouth every 6 (six) hours as needed for Pain. (Patient not taking: Reported on 10/30/2019), Disp: 25 tablet, Rfl: 0    lurasidone (LATUDA) 120 mg Tab, Take 1 tablet by mouth at 5 pm with food (Patient not taking: Reported on 10/30/2019), Disp: 30 tablet, Rfl: 0    mirtazapine (REMERON) 45 MG tablet, Take 1 tablet by mouth at bedtime (Patient not taking: Reported on 10/30/2019), Disp: 30 tablet, Rfl: 0    nystatin-triamcinolone (MYCOLOG II) cream, Apply to affected area 2 times daily, Disp: 30 g, Rfl: 1    oxyCODONE-acetaminophen (PERCOCET)  mg per tablet, Take 1 tablet by mouth every 8 (eight) hours as needed for Pain. (Patient not taking: Reported on 10/30/2019), Disp: 6  "tablet, Rfl: 0    zolpidem (AMBIEN) 10 mg Tab, Take 1 tablet by mouth at bedtime (Patient not taking: Reported on 10/30/2019), Disp: 30 tablet, Rfl: 0    GYNECOLOGY HISTORY:  Genital warts, HSV-1; no abnormal pap requiring tx    DATA REVIEWED:  Last pap: see above  Last mmg: normal Date: 5/2019    /76 (BP Location: Right arm)   Ht 4' 8" (1.422 m)   Wt 92.5 kg (203 lb 14.8 oz)   BMI 45.72 kg/m²     ROS:  GENERAL: Denies weight gain or weight loss. Feeling well overall.   SKIN: Denies rash or lesions.   HEAD: Denies head injury or headache.   NODES: Denies enlarged lymph nodes.   CHEST: Denies chest pain or shortness of breath.   CARDIOVASCULAR: Denies palpitations or left sided chest pain.   ABDOMEN: No abdominal pain, constipation, diarrhea, nausea, vomiting or rectal bleeding.   URINARY: No frequency, dysuria, hematuria, or burning on urination.  REPRODUCTIVE: See HPI.   BREASTS: The patient denies pain, lumps, or nipple discharge.   HEMATOLOGIC: No easy bruisability or excessive bleeding.   MUSCULOSKELETAL: Denies joint pain or swelling.   NEUROLOGIC: Denies syncope or weakness.   PSYCHIATRIC: Denies depression, anxiety or mood swings.    PE:   APPEARANCE: Well nourished, well developed, in no acute distress.  AFFECT: WNL, alert and oriented x 3.  SKIN: No acne or hirsutism.  NECK: Neck symmetric without masses or thyromegaly.  NODES: No inguinal, cervical, axillary or femoral lymph node enlargement.  CHEST: Good respiratory effort.   ABDOMEN: very rotund, mildly distended (no previous exams by me to compare for changes). No tenderness or masses. No hepatosplenomegaly. No hernias.   BREASTS: Symmetrical, no skin changes or visible lesions. No palpable masses, nipple discharge bilaterally.  PELVIC: Normal external female genitalia w/ bilateral yeast like rash. No ulcerations. Normal hair distribution. Adequate perineal body, normal urethral meatus. Vagina atrophic without lesions or discharge. No " significant cystocele or rectocele. Bimanual exam shows uterus and cervix to be surgically absent. Adnexa without masses or tenderness.  EXTREMITIES: No edema.    Encounter for gynecological examination without abnormal finding    Abdominal distension  -     CT Abdomen Pelvis W Wo Contrast; Future; Expected date: 10/30/2019    Other orders  -     nystatin-triamcinolone (MYCOLOG II) cream; Apply to affected area 2 times daily  Dispense: 30 g; Refill: 1  -     fluconazole (DIFLUCAN) 150 MG Tab; Take 1 tablet (150 mg total) by mouth once daily. for 1 day  Dispense: 1 tablet; Refill: 0    Patient was counseled today on A.C.S. Pap guidelines (none needed) and recommendations for yearly pelvic exams, yearly mammograms starting age 40, and clinical breast exams; to see her PCP for other health maintenance. Vulvovaginitis prevention

## 2019-10-31 ENCOUNTER — OFFICE VISIT (OUTPATIENT)
Dept: SURGERY | Facility: CLINIC | Age: 49
End: 2019-10-31
Payer: MEDICAID

## 2019-10-31 VITALS
BODY MASS INDEX: 45.38 KG/M2 | HEIGHT: 56 IN | HEART RATE: 106 BPM | DIASTOLIC BLOOD PRESSURE: 84 MMHG | SYSTOLIC BLOOD PRESSURE: 126 MMHG | TEMPERATURE: 99 F | WEIGHT: 201.75 LBS

## 2019-10-31 DIAGNOSIS — T14.8XXA OPEN WOUND: Primary | ICD-10-CM

## 2019-10-31 PROCEDURE — 99999 PR PBB SHADOW E&M-EST. PATIENT-LVL V: ICD-10-PCS | Mod: PBBFAC,,, | Performed by: SURGERY

## 2019-10-31 PROCEDURE — 99215 OFFICE O/P EST HI 40 MIN: CPT | Mod: PBBFAC | Performed by: SURGERY

## 2019-10-31 PROCEDURE — 99999 PR PBB SHADOW E&M-EST. PATIENT-LVL V: CPT | Mod: PBBFAC,,, | Performed by: SURGERY

## 2019-10-31 PROCEDURE — 99024 PR POST-OP FOLLOW-UP VISIT: ICD-10-PCS | Mod: ,,, | Performed by: SURGERY

## 2019-10-31 PROCEDURE — 99024 POSTOP FOLLOW-UP VISIT: CPT | Mod: ,,, | Performed by: SURGERY

## 2019-10-31 RX ORDER — OXYCODONE AND ACETAMINOPHEN 7.5; 325 MG/1; MG/1
1 TABLET ORAL EVERY 6 HOURS PRN
Qty: 20 TABLET | Refills: 0 | Status: SHIPPED | OUTPATIENT
Start: 2019-10-31 | End: 2019-12-09

## 2019-10-31 RX ORDER — MUPIROCIN 20 MG/G
OINTMENT TOPICAL 3 TIMES DAILY
Qty: 30 G | Refills: 1 | Status: SHIPPED | OUTPATIENT
Start: 2019-10-31 | End: 2019-12-30

## 2019-10-31 NOTE — LETTER
October 31, 2019      Pati Mauro NP  [address]  [place] [address]           [facility]  [address] [place] [id number]  Phone: [phone]  Fax: [phone]          Patient: Yolanda Betts   MR Number: [id number]   YOB: 1970   Date of Visit: 10/31/2019       Dear Pati Mauro:    Thank you for referring Yolanda Betts to me for evaluation. Attached you will find relevant portions of my assessment and plan of care.    If you have questions, please do not hesitate to call me. I look forward to following Yolanda Betts along with you.    Sincerely,    Damir Driscoll MD    Enclosure  CC:  No Recipients    If you would like to receive this communication electronically, please contact [email or web address] or [phone] to request more information on [employer] Link access.    For providers and/or their staff who would like to refer a patient to [facility], please contact us through our one-stop-shop provider referral line, Dangelo Lee, at [phone].    If you feel you have received this communication in error or would no longer like to receive these types of communications, please e-mail [email or web address]

## 2019-10-31 NOTE — TELEPHONE ENCOUNTER
REFILL APPROVED     Requested Prescriptions     Signed Prescriptions Disp Refills    spironolactone (ALDACTONE) 25 MG tablet 14 tablet 0     Sig: Take 1 tablet (25 mg total) by mouth once daily.     Authorizing Provider: DOMINIK SHEN

## 2019-10-31 NOTE — PATIENT INSTRUCTIONS
The pain should improve over time.    Please try to use the narcotics sparingly as they are just going to make the itching worse.    You can use antibiotic ointment/Bactrim band in the wound and covered with a large Band-Aid or gauze.    We will see you back in 2 weeks.    If you have any issues please call the office    Our office phone numbers are  [phone] and

## 2019-10-31 NOTE — PROGRESS NOTES
Subjective:       Patient ID: Yolanda Betts is a 49 y.o. female.    Drainage of a moderate size right flank abscess    Chief Complaint: Follow-up (wound check)    Patient had significant pain.  She went to the emergency room. She was given Percocet.  That seems to work better on lower causes itching.  She is taking Benadryl for this.    She offers no other complaints at this time.  The pain is slowly improving    Review of Systems   Skin:        Pain at the abscess site       Objective:      Physical Exam   Constitutional:   Morbidly obese   Skin:   The right flank abscess site which is approximately 6 x 2 x 1 cm deep is clean.  There is some granulation tissue.  There is no surrounding erythema.  It is located in the skin fold   Vitals reviewed.      Assessment:      abscess site is healing although causing some discomfort     Plan:       Percocet 7.5 mg as needed.  She will try to minimize this due to the itching.  Benadryl   to control the itching  Bactroban ointment and dry gauze.  Follow up in 2 weeks

## 2019-11-03 NOTE — ASSESSMENT & PLAN NOTE
BG typically high 200s on insulin detimir 76 units QHS and Novolog 10 units TID QAC, PLUS Victoza. No hypoglycemia.  PLAN: Change insulin detimir to 45 units injected 2 times daily (morning and evening).

## 2019-11-03 NOTE — ASSESSMENT & PLAN NOTE
Asymptomatic, uncontrolled on Amlodpine, Valsartan/HCT, and Metoprolol, AND clonidine 0.1 TID.  PLAN: Cardiology consult.

## 2019-11-04 ENCOUNTER — OFFICE VISIT (OUTPATIENT)
Dept: CARDIOLOGY | Facility: CLINIC | Age: 49
End: 2019-11-04
Payer: MEDICAID

## 2019-11-04 VITALS
DIASTOLIC BLOOD PRESSURE: 88 MMHG | SYSTOLIC BLOOD PRESSURE: 126 MMHG | BODY MASS INDEX: 44.93 KG/M2 | OXYGEN SATURATION: 97 % | WEIGHT: 200.38 LBS | HEART RATE: 106 BPM

## 2019-11-04 DIAGNOSIS — Z79.4 TYPE 2 DIABETES MELLITUS WITH HYPERGLYCEMIA, WITH LONG-TERM CURRENT USE OF INSULIN: Chronic | ICD-10-CM

## 2019-11-04 DIAGNOSIS — E66.9 NOCTURNAL HYPOXEMIA DUE TO OBESITY: ICD-10-CM

## 2019-11-04 DIAGNOSIS — E78.5 DYSLIPIDEMIA ASSOCIATED WITH TYPE 2 DIABETES MELLITUS: Chronic | ICD-10-CM

## 2019-11-04 DIAGNOSIS — G47.36 NOCTURNAL HYPOXEMIA DUE TO OBESITY: ICD-10-CM

## 2019-11-04 DIAGNOSIS — E11.65 TYPE 2 DIABETES MELLITUS WITH HYPERGLYCEMIA, WITH LONG-TERM CURRENT USE OF INSULIN: Chronic | ICD-10-CM

## 2019-11-04 DIAGNOSIS — E78.1 HYPERTRIGLYCERIDEMIA: Chronic | ICD-10-CM

## 2019-11-04 DIAGNOSIS — R94.31 ABNORMAL ECG: ICD-10-CM

## 2019-11-04 DIAGNOSIS — E11.69 DYSLIPIDEMIA ASSOCIATED WITH TYPE 2 DIABETES MELLITUS: Chronic | ICD-10-CM

## 2019-11-04 DIAGNOSIS — E66.01 MORBID OBESITY WITH BMI OF 40.0-44.9, ADULT: ICD-10-CM

## 2019-11-04 DIAGNOSIS — E66.2 OBESITY HYPOVENTILATION SYNDROME: ICD-10-CM

## 2019-11-04 DIAGNOSIS — E83.52 HYPERCALCEMIA: ICD-10-CM

## 2019-11-04 DIAGNOSIS — I10 ESSENTIAL HYPERTENSION: Primary | Chronic | ICD-10-CM

## 2019-11-04 PROCEDURE — 99215 OFFICE O/P EST HI 40 MIN: CPT | Mod: PBBFAC | Performed by: NURSE PRACTITIONER

## 2019-11-04 PROCEDURE — 99999 PR PBB SHADOW E&M-EST. PATIENT-LVL V: ICD-10-PCS | Mod: PBBFAC,,, | Performed by: NURSE PRACTITIONER

## 2019-11-04 PROCEDURE — 99214 OFFICE O/P EST MOD 30 MIN: CPT | Mod: S$PBB,,, | Performed by: NURSE PRACTITIONER

## 2019-11-04 PROCEDURE — 99999 PR PBB SHADOW E&M-EST. PATIENT-LVL V: CPT | Mod: PBBFAC,,, | Performed by: NURSE PRACTITIONER

## 2019-11-04 PROCEDURE — 99214 PR OFFICE/OUTPT VISIT, EST, LEVL IV, 30-39 MIN: ICD-10-PCS | Mod: S$PBB,,, | Performed by: NURSE PRACTITIONER

## 2019-11-04 RX ORDER — VALSARTAN 320 MG/1
320 TABLET ORAL DAILY
Qty: 90 TABLET | Refills: 3 | Status: SHIPPED | OUTPATIENT
Start: 2019-11-04 | End: 2020-09-16 | Stop reason: SDUPTHER

## 2019-11-04 RX ORDER — METOPROLOL SUCCINATE 100 MG/1
200 TABLET, EXTENDED RELEASE ORAL NIGHTLY
Qty: 180 TABLET | Refills: 3 | Status: SHIPPED | OUTPATIENT
Start: 2019-11-04 | End: 2020-09-16 | Stop reason: SDUPTHER

## 2019-11-04 NOTE — PROGRESS NOTES
Subjective:   Patient ID:  Yolanda Betts is a 49 y.o. female who presents for evaluation of No chief complaint on file.      HPI     Yolanda Betts is a 49 year old female who presents to clinic for BP check. Her current medical conditions include HTN, HLP, Dm Type II, GERD, Morbid obesity, ALEN. She returns today and states she is doing ok. She follows Dr. Sanchez for Bipolar disorder and medication management issues.     She does endorse daily fatigue issues and lack of energy. She is taking Cogentin and Xanax at bedtime but reports issues with awakening in AM. She reports intermittent trace LE edema but none today.     Denies chest pain or anginal equivalents. No shortness of breath, COATS or palpitations. Denies orthopnea, PND or abdominal bloating. Reports regular walking without any issues lately. NO leg swelling or claudications. No recent falls, syncope or near syncopal events. Reports compliance with medications and dietary restrictions. NO CNS complaints to suggest TIA or CVA today. No signs of abnormal bleeding on ASA daily.     Past Medical History:   Diagnosis Date    Abnormal Pap smear of cervix     HPV genital warts    Anemia     Anxiety     Arthritis     Asthma 10/11/2016    Bipolar 1 disorder     Diabetes mellitus, type 2     Dyslipidemia associated with type 2 diabetes mellitus 5/13/2019    Genital warts     GERD (gastroesophageal reflux disease)     Herpes simplex virus (HSV) infection     Hypertension     Hypertension complicating diabetes 5/5/2019    Mild persistent asthma without complication 10/11/2016    Morbid obesity with body mass index (BMI) of 45.0 to 49.9 in adult 8/3/2017    Obstructive sleep apnea     Schizoaffective disorder, bipolar type 4/25/2019    Seasonal allergic rhinitis due to pollen 5/13/2019    Type 2 diabetes mellitus with hyperglycemia, with long-term current use of insulin 12/21/2017    Type 2 diabetes mellitus with hyperglycemia, without  long-term current use of insulin 2017       Past Surgical History:   Procedure Laterality Date    BELT ABDOMINOPLASTY      BREAST SURGERY Bilateral 1996    Reduction     SECTION      X 2    COLONOSCOPY      COLONOSCOPY N/A 2018    Procedure: colonoscopy for iron deficiency anemia;  Surgeon: Frankie Sanchez MD;  Location: [facility] ENDO;  Service: Endoscopy;  Laterality: N/A;    COLONOSCOPY N/A 2018    Procedure: COLONOSCOPY;  Surgeon: Frankie Sanchez MD;  Location: [facility] ENDO;  Service: Endoscopy;  Laterality: N/A;    HYSTERECTOMY      w/ BSO; hypermenorrhea    MOUTH SURGERY      OOPHORECTOMY      hyst/bso, hypermenorrhea    TUBAL LIGATION         Social History     Tobacco Use    Smoking status: Never Smoker    Smokeless tobacco: Never Used   Substance Use Topics    Alcohol use: Yes     Alcohol/week: 0.0 standard drinks     Comment: socially    Drug use: No       Family History   Problem Relation Age of Onset    Diabetes Mother     Hyperlipidemia Mother     Hypertension Mother     Asthma Mother     COPD Mother     Glaucoma Mother     Thyroid disease Mother     Hypertension Father     Hyperlipidemia Father     Cancer Father         Brain, lung, liver, kidney    Heart disease Maternal Grandmother     Hyperlipidemia Maternal Grandmother     Hypertension Maternal Grandmother     Cataracts Maternal Grandmother     Diabetes Maternal Grandmother     Heart disease Maternal Grandfather     Hyperlipidemia Maternal Grandfather     Hypertension Maternal Grandfather     Glaucoma Maternal Grandfather     Cancer Maternal Grandfather     Cataracts Maternal Grandfather     Macular degeneration Maternal Grandfather     Diabetes Maternal Grandfather     Heart disease Paternal Grandmother     Hyperlipidemia Paternal Grandmother     Hypertension Paternal Grandmother     Cataracts Paternal Grandmother     Heart disease Paternal Grandfather     Hyperlipidemia  "Paternal Grandfather     Hypertension Paternal Grandfather     Cataracts Paternal Grandfather     Breast cancer Maternal Cousin     Breast cancer Maternal Cousin     Breast cancer Maternal Cousin     Breast cancer Maternal Cousin      Wt Readings from Last 3 Encounters:   10/31/19 91.5 kg (201 lb 11.5 oz)   10/30/19 92.5 kg (203 lb 14.8 oz)   10/28/19 90.1 kg (198 lb 10.2 oz)     Temp Readings from Last 3 Encounters:   10/31/19 98.5 °F (36.9 °C)   10/28/19 97 °F (36.1 °C) (Tympanic)   10/22/19 98.7 °F (37.1 °C) (Oral)     BP Readings from Last 3 Encounters:   10/31/19 126/84   10/30/19 130/76   10/28/19 122/86     Pulse Readings from Last 3 Encounters:   10/31/19 106   10/28/19 105   10/22/19 98     Current Outpatient Medications on File Prior to Visit   Medication Sig Dispense Refill    acyclovir (ZOVIRAX) 400 MG tablet Take 1 tablet (400 mg total) by mouth 3 (three) times daily. for 10 days 30 tablet 0    albuterol (PROVENTIL/VENTOLIN HFA) 90 mcg/actuation inhaler Inhale 2 puffs into the lungs every 4 (four) hours as needed. 8.5 g 5    ALPRAZolam (XANAX) 2 MG Tab Take 2 mg by mouth 2 (two) times daily.       ALPRAZolam (XANAX) 2 MG Tab Take 1 tablet by mouth twice daily 60 tablet 0    ALPRAZolam (XANAX) 2 MG Tab Take 1 tablet by mouth twice daily 60 tablet 0    amLODIPine (NORVASC) 10 MG tablet Take 10 mg by mouth once daily.      ARIPiprazole (ABILIFY) 5 MG Tab Take 1 tablet by mouth every day 30 tablet 0    aspirin (ECOTRIN) 81 MG EC tablet Take 81 mg by mouth once daily.      atorvastatin (LIPITOR) 20 MG tablet Take 1 tablet (20 mg total) by mouth every evening. 90 tablet 3    BD INSULIN SYRINGE ULTRA-FINE 1/2 mL 30 gauge x 1/2" Syrg   0    benztropine (COGENTIN) 1 MG tablet Take 1 mg by mouth nightly.  1    benztropine (COGENTIN) 1 MG tablet Take 1 tablet by mouth at bedtime 30 tablet 0    benztropine (COGENTIN) 1 MG tablet Take 1 tablet by mouth at bedtime 30 tablet 0    blood sugar " diagnostic Strp Check blood glucose 3 times daily as directed and as needed (dispense insurance preferred brand or patient choice) 100 each 11    blood-glucose meter Misc use 3 times daily as directed 1 each 0    cloNIDine (CATAPRES) 0.1 MG tablet Take 1 tablet (0.1 mg total) by mouth every 6 (six) hours as needed (for SBP>150 or DBP>100 mmHg). 60 tablet 1    cycloSPORINE (RESTASIS) 0.05 % ophthalmic emulsion Place 0.4 mLs (1 drop total) into both eyes 2 (two) times daily. 60 each 11    DULoxetine (CYMBALTA) 30 MG capsule Take 1 capsule by mouth twice daily 60 capsule 0    duloxetine (CYMBALTA) 60 MG capsule Take 60 mg by mouth 2 (two) times daily.  2    DULoxetine (CYMBALTA) 60 MG capsule Take 1 capsule by mouth twice daily 60 capsule 0    empagliflozin (JARDIANCE) 10 mg Tab Take 1 tablet (10 mg) by mouth every morning. 30 tablet 5    ergocalciferol (ERGOCALCIFEROL) 50,000 unit Cap Take 1 capsule (50,000 Units total) by mouth every 14 (fourteen) days. 4 capsule 5    fish oil-omega-3 fatty acids 300-1,000 mg capsule Take 1 capsule by mouth once daily.      fluticasone propionate (FLONASE) 50 mcg/actuation nasal spray 2 sprays (100 mcg total) by Each Nare route once daily. 16 g 11    frovatriptan (FROVA) 2.5 MG tablet Take one tablet orally. If recurs, may repeat after 2 hours. Max of 3 tabs in 24 hours. 9 tablet 1    gabapentin (NEURONTIN) 300 MG capsule Take 2 capsules (600 mg total) by mouth 3 (three) times daily. (Patient taking differently: Take 300 mg by mouth 2 (two) times daily. ) 90 capsule 3    insulin aspart U-100 (NOVOLOG FLEXPEN U-100 INSULIN) 100 unit/mL (3 mL) InPn pen Inject before meals TID AC up to 30 units daily 15 mL 3    insulin detemir U-100 (LEVEMIR FLEXTOUCH) 100 unit/mL (3 mL) SubQ InPn pen Inject 76 Units into the skin every evening. 25 mL 3    lancets 28 gauge Misc use as directed 3 times daily 100 each 11    LATUDA 120 mg Tab       LINZESS 145 mcg Cap capsule Take 145 mcg  "by mouth as needed.   3    liraglutide 0.6 mg/0.1 mL, 18 mg/3 mL, subq PNIJ (VICTOZA 2-MILENA) 0.6 mg/0.1 mL (18 mg/3 mL) PnIj Use 1.8mg under the skin daily 9 mL 3    lurasidone (LATUDA) 120 mg Tab Take 1 tablet by mouth at 5 pm with food 30 tablet 0    metFORMIN (GLUCOPHAGE) 1000 MG tablet TAKE 1 TABLET (1,000 MG TOTAL) BY MOUTH 2 (TWO) TIMES DAILY. 180 tablet 11    metoprolol succinate (TOPROL-XL) 100 MG 24 hr tablet Take 1 tablet (100 mg total) by mouth once daily. 30 tablet 5    mirtazapine (REMERON) 45 MG tablet Take 45 mg by mouth every evening.      mirtazapine (REMERON) 45 MG tablet Take 1 tablet by mouth at bedtime 30 tablet 0    mometasone (ASMANEX TWISTHALER) 220 mcg (120 doses) AePB Inhale 2 puffs into the lungs 2 (two) times daily. Controller 1 each 11    montelukast (SINGULAIR) 10 mg tablet Take 1 tablet (10 mg total) by mouth nightly. 30 tablet 11    MULTIVIT,CALC,MINS/IRON/FOLIC (DAILY MULTIPLE FOR WOMEN ORAL) Take 1 tablet by mouth once daily.      mupirocin (BACTROBAN) 2 % ointment Apply topically 3 (three) times daily. 30 g 1    nitroGLYCERIN (NITROSTAT) 0.4 MG SL tablet Dissolve 1 tablet under the tongue every 5 minutes as needed, up to 3 times. If chest pain persists, call 911.      nystatin-triamcinolone (MYCOLOG II) cream Apply to affected area 2 times daily 30 g 1    oxyCODONE-acetaminophen (PERCOCET) 7.5-325 mg per tablet Take 1 tablet by mouth every 6 (six) hours as needed for Pain. 20 tablet 0    pen needle, diabetic (BD ULTRA-FINE MINI PEN NEEDLE) 31 gauge x 3/16" Ndle Use 5 a day 200 each 3    promethazine (PHENERGAN) 25 MG tablet Take 1 tablet (25 mg total) by mouth every 8 (eight) hours as needed for Nausea. sedating 14 tablet 0    spironolactone (ALDACTONE) 25 MG tablet Take 1 tablet (25 mg total) by mouth once daily. 14 tablet 0    traMADol (ULTRAM) 50 mg tablet Take 1 tablet (50 mg total) by mouth every 6 (six) hours as needed. 12 tablet 0    valACYclovir (VALTREX) " 1000 MG tablet Take 1 tablet (1,000 mg total) by mouth once daily. 30 tablet 11    valsartan-hydrochlorothiazide (DIOVAN-HCT) 320-25 mg per tablet Take 1 tablet by mouth once daily. 90 tablet 3    zolpidem (AMBIEN) 10 mg Tab Take 10 mg by mouth nightly as needed.      zolpidem (AMBIEN) 10 mg Tab Take 1 tablet by mouth at bedtime 30 tablet 0     No current facility-administered medications on file prior to visit.        Review of Systems   Constitution: Positive for malaise/fatigue.   HENT: Negative for hearing loss and hoarse voice.    Eyes: Negative for blurred vision and visual disturbance.   Cardiovascular: Positive for leg swelling and palpitations. Negative for chest pain, claudication, dyspnea on exertion, irregular heartbeat, near-syncope, orthopnea, paroxysmal nocturnal dyspnea and syncope.   Respiratory: Positive for sleep disturbances due to breathing. Negative for cough, hemoptysis, shortness of breath, snoring and wheezing.    Endocrine: Negative for cold intolerance and heat intolerance.   Hematologic/Lymphatic: Bruises/bleeds easily.   Skin: Negative for color change, dry skin and nail changes.   Musculoskeletal: Positive for arthritis, back pain and joint pain. Negative for myalgias.   Gastrointestinal: Negative for bloating, abdominal pain, constipation, nausea and vomiting.   Genitourinary: Negative for dysuria, flank pain, hematuria and hesitancy.   Neurological: Positive for weakness. Negative for headaches, light-headedness, loss of balance, numbness and paresthesias.   Psychiatric/Behavioral: Negative for altered mental status.   Allergic/Immunologic: Negative for environmental allergies.     /88 (BP Location: Left arm, Patient Position: Sitting)   Pulse 106   Wt 90.9 kg (200 lb 6.4 oz)   SpO2 97%   BMI 44.93 kg/m²     Objective:   Physical Exam   Constitutional: She is oriented to person, place, and time. She appears well-developed and well-nourished. No distress.   HENT:   Head:  Normocephalic and atraumatic.   Eyes: Pupils are equal, round, and reactive to light.   Neck: Normal range of motion and full passive range of motion without pain. Neck supple. No JVD present.   Cardiovascular: Regular rhythm, S1 normal, S2 normal and intact distal pulses.  Occasional extrasystoles are present. Tachycardia present. PMI is not displaced. Exam reveals no distant heart sounds.   No murmur heard.  Pulses:       Radial pulses are 2+ on the right side, and 2+ on the left side.        Dorsalis pedis pulses are 2+ on the right side, and 2+ on the left side.   Chest pain free   Pulmonary/Chest: Effort normal and breath sounds normal. No accessory muscle usage. No respiratory distress. She has no decreased breath sounds. She has no wheezes. She has no rales.   Reports nocturnal oxygen therapy   Abdominal: Soft. Bowel sounds are normal. She exhibits no distension. There is no tenderness.   +obese abdomen   Musculoskeletal: Normal range of motion. She exhibits no edema.        Right ankle: She exhibits no swelling.        Left ankle: She exhibits no swelling.   Neurological: She is alert and oriented to person, place, and time.   Skin: Skin is warm and dry. She is not diaphoretic. No cyanosis. Nails show no clubbing.   Psychiatric: She has a normal mood and affect. Her speech is normal and behavior is normal. Judgment and thought content normal. Cognition and memory are normal.   Nursing note and vitals reviewed.    CONCLUSIONS     1 - Concentric hypertrophy.     2 - No wall motion abnormalities.     3 - Normal left ventricular systolic function (EF 60-65%).     4 - Impaired LV relaxation, normal LAP (grade 1 diastolic dysfunction).     5 - Normal right ventricular systolic function .             This document has been electronically    SIGNED BY: David Elena MD On: 06/06/2019 19:13  Lab Results   Component Value Date    CHOL 142 04/25/2019    CHOL 102 (L) 12/22/2017    CHOL 128 03/22/2016     Lab Results    Component Value Date    HDL 36 (L) 04/25/2019    HDL 43 12/22/2017    HDL 46 03/22/2016     Lab Results   Component Value Date    LDLCALC 40.4 (L) 04/25/2019    LDLCALC 33.6 (L) 12/22/2017    LDLCALC 47.4 (L) 03/22/2016     Lab Results   Component Value Date    TRIG 328 (H) 04/25/2019    TRIG 127 12/22/2017    TRIG 173 (H) 03/22/2016     Lab Results   Component Value Date    CHOLHDL 25.4 04/25/2019    CHOLHDL 42.2 12/22/2017    CHOLHDL 35.9 03/22/2016       Chemistry        Component Value Date/Time     10/28/2019 1100    K 4.3 10/28/2019 1100    CL 96 10/28/2019 1100    CO2 26 10/28/2019 1100    BUN 15 10/28/2019 1100    CREATININE 0.8 10/28/2019 1100     (H) 10/28/2019 1100        Component Value Date/Time    CALCIUM 10.8 (H) 10/28/2019 1100    ALKPHOS 110 10/07/2019 1510    AST 54 (H) 10/07/2019 1510    ALT 51 (H) 10/07/2019 1510    BILITOT 0.3 10/07/2019 1510    ESTGFRAFRICA >60.0 10/28/2019 1100    EGFRNONAA >60.0 10/28/2019 1100          Lab Results   Component Value Date    TSH 1.855 12/22/2017     Lab Results   Component Value Date    INR 0.9 11/23/2016     Lab Results   Component Value Date    WBC 8.21 10/07/2019    HGB 12.5 10/07/2019    HCT 42.0 10/07/2019    MCV 90 10/07/2019     10/07/2019        Assessment:      1. Essential hypertension    2. Hypertriglyceridemia    3. Dyslipidemia associated with type 2 diabetes mellitus    4. Abnormal ECG    5. Type 2 diabetes mellitus with hyperglycemia, with long-term current use of insulin    6. Morbid obesity with BMI of 40.0-44.9, adult    7. Nocturnal hypoxemia due to obesity    8. Obesity hypoventilation syndrome      Patient presents to clinic for BP follow up and is doing ok today.  BP well controlled today  Given elevated calcium on recent labs, will stop HCTZ for now  Repeat BMP and Ionized calcium in 2 weeks  Plan:     Increase Toprol XL to 200 mg nightly  Change to Valsartan 320 mg daily  STOP HCTZ  Continue all other CV meds for  now  Dash diet, 2 gm sodium restriction  1500 ml fluid restriction  Encourage regular physical activity as tolerated  Monitor BP at home  RTC in 2-4 weeks or sooner if needed    NOLAN AnguloPHAY  [facility]

## 2019-11-06 ENCOUNTER — TELEPHONE (OUTPATIENT)
Dept: OBSTETRICS AND GYNECOLOGY | Facility: CLINIC | Age: 49
End: 2019-11-06

## 2019-11-06 NOTE — TELEPHONE ENCOUNTER
----- Message from Sepideh Sanders, RT sent at 11/6/2019  3:23 PM CST -----  Contact: RADIOLOGY  2ND REQUEST   Patient is scheduled for a CT test  and insurance DENIED test, an in basket was sent to providers office for an appeal. Please determine what provider plans on doing and please call patient to get appt cancel and discuss other options if necessary. Any questions please call radiology at ext [phone]  PLEASE CALL PATIENT AND CANCEL APPT., THANKS

## 2019-11-06 NOTE — TELEPHONE ENCOUNTER
----- Message from Sepideh Sanders, RT sent at 11/6/2019  8:58 AM CST -----  Contact: =RADIOLOGY  Patient is scheduled for a CT test  and insurance DENIED test, an in basket was sent to providers office for an appeal. Please determine what provider plans on doing and please call patient to get appt cancel and discuss other options if necessary. Any questions please call radiology at ext [phone]

## 2019-11-07 NOTE — PROGRESS NOTES
Digital Medicine: Health  Follow-Up    Patient states that she is doing alright. Patient started on spironolactone towards the beginning of October and has helped with bringing BP numbers down. Patient charged cuff last week and when she took cuff to last office visit and they discovered that her cuff was not pumping up so they replaced cuff itself. Patient states that cuff is charged and working the way it's supposed to now. Patient reports being tired all of the time saying that her mind is willing but her body is not. Patient also diagnosed with ALEN and has CPAP which is fine when she starts the night off but she will wake up and the mask is off. Patient plans to inquire about different options of CPAP mask at next office visit. Patient states that her weight fluctuates and that most days she doesn't have much of an appetite. Patient has a cardiology appointment coming up as well as a endocrinology appointment upcoming. Will reach out after thanks giving and again before sukhdev.    The history is provided by the patient.     Follow Up  Follow-up reason(s): reading review      Readings are trending down due to medication adherence.        INTERVENTION(S)  encouragement/support      There are no preventive care reminders to display for this patient.    Last 5 Patient Entered Readings                                      Current 30 Day Average: 148/96     Recent Readings 11/7/2019 11/4/2019 11/4/2019 10/31/2019 10/30/2019    SBP (mmHg) 149 140 137 134 134    DBP (mmHg) 91 85 93 85 85    Pulse 94 93 106 101 101                      Diet Screening   She has the following dietary restrictions: diabetic    Patient states that she doesn't have much of an appetite at all and eats sparingly at best. Patient did have hemoglobin A1c labs moni and A1c has increased to 8.4% since last test.     Physical Activity Screening   When asked if exercising, patient responded: no    She identified the following barriers to  physical activity: patient states that she experiences tiredness and muscle weakness all of the time.     Patient is not exercising currently and finds it a struggle to get up in the morning.    Medication Adherence Screening   She misses doses: never      Patient identified the following reasons for non-compliance: none    Patient reports no s/s or issues taking medication as prescribed. Patient also stated that she thinks spironolactone is helping to bring her BP numbers down.       SDOH

## 2019-11-10 ENCOUNTER — PATIENT MESSAGE (OUTPATIENT)
Dept: OBSTETRICS AND GYNECOLOGY | Facility: CLINIC | Age: 49
End: 2019-11-10

## 2019-11-11 ENCOUNTER — PATIENT OUTREACH (OUTPATIENT)
Dept: ADMINISTRATIVE | Facility: HOSPITAL | Age: 49
End: 2019-11-11

## 2019-11-11 ENCOUNTER — OFFICE VISIT (OUTPATIENT)
Dept: ENDOCRINOLOGY | Facility: CLINIC | Age: 49
End: 2019-11-11
Payer: MEDICAID

## 2019-11-11 VITALS
BODY MASS INDEX: 45.18 KG/M2 | TEMPERATURE: 99 F | SYSTOLIC BLOOD PRESSURE: 130 MMHG | DIASTOLIC BLOOD PRESSURE: 82 MMHG | WEIGHT: 201.5 LBS | HEART RATE: 94 BPM | RESPIRATION RATE: 18 BRPM

## 2019-11-11 DIAGNOSIS — R14.0 ABDOMINAL DISTENSION: ICD-10-CM

## 2019-11-11 DIAGNOSIS — R10.2 PELVIC PAIN IN FEMALE: ICD-10-CM

## 2019-11-11 DIAGNOSIS — N76.0 VULVOVAGINITIS: ICD-10-CM

## 2019-11-11 DIAGNOSIS — R74.8 ELEVATED LIVER ENZYMES: ICD-10-CM

## 2019-11-11 DIAGNOSIS — T14.8XXA OPEN WOUND OF SKIN: ICD-10-CM

## 2019-11-11 DIAGNOSIS — R10.9 FLANK PAIN: ICD-10-CM

## 2019-11-11 DIAGNOSIS — I10 ESSENTIAL HYPERTENSION: Chronic | ICD-10-CM

## 2019-11-11 DIAGNOSIS — Z79.4 TYPE 2 DIABETES MELLITUS WITH HYPERGLYCEMIA, WITH LONG-TERM CURRENT USE OF INSULIN: Primary | Chronic | ICD-10-CM

## 2019-11-11 DIAGNOSIS — E11.65 TYPE 2 DIABETES MELLITUS WITH HYPERGLYCEMIA, WITH LONG-TERM CURRENT USE OF INSULIN: Primary | Chronic | ICD-10-CM

## 2019-11-11 LAB — GLUCOSE SERPL-MCNC: 269 MG/DL (ref 70–110)

## 2019-11-11 PROCEDURE — 99214 OFFICE O/P EST MOD 30 MIN: CPT | Mod: S$PBB,,, | Performed by: INTERNAL MEDICINE

## 2019-11-11 PROCEDURE — 99214 PR OFFICE/OUTPT VISIT, EST, LEVL IV, 30-39 MIN: ICD-10-PCS | Mod: S$PBB,,, | Performed by: INTERNAL MEDICINE

## 2019-11-11 PROCEDURE — 82962 GLUCOSE BLOOD TEST: CPT | Mod: PBBFAC | Performed by: INTERNAL MEDICINE

## 2019-11-11 PROCEDURE — 99215 OFFICE O/P EST HI 40 MIN: CPT | Mod: PBBFAC | Performed by: INTERNAL MEDICINE

## 2019-11-11 PROCEDURE — 99999 PR PBB SHADOW E&M-EST. PATIENT-LVL V: CPT | Mod: PBBFAC,,, | Performed by: INTERNAL MEDICINE

## 2019-11-11 PROCEDURE — 99999 PR PBB SHADOW E&M-EST. PATIENT-LVL V: ICD-10-PCS | Mod: PBBFAC,,, | Performed by: INTERNAL MEDICINE

## 2019-11-11 RX ORDER — FLUCONAZOLE 150 MG/1
150 TABLET ORAL DAILY
Qty: 1 TABLET | Refills: 1 | Status: SHIPPED | OUTPATIENT
Start: 2019-11-11 | End: 2019-11-12

## 2019-11-11 RX ORDER — INSULIN GLARGINE 300 U/ML
INJECTION, SOLUTION SUBCUTANEOUS
Qty: 4.5 ML | Refills: 3 | Status: SHIPPED | OUTPATIENT
Start: 2019-11-11 | End: 2020-02-04 | Stop reason: SDUPTHER

## 2019-11-11 NOTE — PROGRESS NOTES
Needed labs ordered and linked. Repeat A1c ordered and linked.Referral to DM education already done.

## 2019-11-11 NOTE — PROGRESS NOTES
Patient ID: Yolanda Betts is a 49 y.o. female.  Patient is here for follow up        Chief Complaint: Follow-up and Diabetes      HPI    Yolanda Betts is here for follow-up  of type 2 Diabetes Mellitus    Consultation requested by Dr. Ermias Mccormick    PCP: CHARLA Beach MD      Diagnosed:  Around 2000 and has been on insulin several years.  Had better control earlier on around her diagnosis  Saw Millie Pruitt 2/2018 saw endocrinologist once in 2016, Dr. Mejia    She had a normal C-peptide April 2018 4.56      Past Medical History:   Diagnosis Date    Anemia     Anxiety     Arthritis     Asthma 10/11/2016    Bipolar 1 disorder     Diabetes mellitus, type 2     Dyslipidemia associated with type 2 diabetes mellitus 5/13/2019    GERD (gastroesophageal reflux disease)     Hypertension     Hypertension complicating diabetes 5/5/2019    Mild persistent asthma without complication 10/11/2016    Morbid obesity with body mass index (BMI) of 45.0 to 49.9 in adult 8/3/2017    Obstructive sleep apnea     Schizoaffective disorder, bipolar type 4/25/2019    Seasonal allergic rhinitis due to pollen 5/13/2019    Type 2 diabetes mellitus with hyperglycemia, without long-term current use of insulin 12/21/2017     Hemoglobin A1C   Date Value Ref Range Status   06/24/2019 7.7 (H) 4.0 - 5.6 % Final     Comment:     ADA Screening Guidelines:  5.7-6.4%  Consistent with prediabetes  >or=6.5%  Consistent with diabetes  High levels of fetal hemoglobin interfere with the HbA1C  assay. Heterozygous hemoglobin variants (HbS, HgC, etc)do  not significantly interfere with this assay.   However, presence of multiple variants may affect accuracy.       Lab Results   Component Value Date    HGBA1C 8.3 (H) 12/22/2017    HGBA1C 8.5 (H) 01/31/2017    HGBA1C 6.6 (H) 03/22/2016       Diabetes medications include: Levemir  now on 45 units twice a day units at night  Metformin  1000mg bid and Victoza 1.8 mg a day and in  spite of telling her to stop the Jardiance 10mg daily at last visit she is still on this and I added NovoLog at meals last visit and she is taking at least 10 units with meals    Tried glyburide in the past    Intolerant of metformin    Interval history:  She continues to have symptoms of yeast infection and vulvovaginitis, was evaluated by GYN and treated with antiviral is a for possible herpes related genital ulcer, symptoms seemed to improve but continue to worsen and reoccur.  She has had Diflucan and anti fungal creams multiple times.  She also developed an abscess on the right flank that has been irrigated and debrided twice.  Still has abdominal distention and complains of pelvic discomfort as well as discomfort in her flank area; her gyn did order the CT of the abdomen and this was denied by her insurance; her liver function tests on her last evaluation in October were elevated; she denies any alcohol use.      She has been having regular bowel movements she says      She is being followed by Cardiology for history of tachycardia and an echocardiogram and Holter were ordered and also has hypertension    History of obstructive sleep apnea but not using CPAP    Diabetes Complications:peripheral neuropathy  Toes and fingers have numbness and Cymbalta helps she says  Hypoglycemia: NO      Meal Planning:  She does admit recently her diet has been poor.barry Lopez water for lunch    Lab Results   Component Value Date    POCGLU 269 (A) 11/11/2019       Diabetes education: YES:  2019 has started seeing diabetes educator    SMBG: Tests BG four times a day    Log or meter available: no-  Home values if available:  FBG:  Lately fasting sugars have been running high in the 200s mostly  Pre-lunch:  Pre-dinner:  Bedtime:  Post Breakfast:  Post Lunch  Post Dinner  Ranges: later in day 200s occ, 160 to 180s     Exercise: No     STANDARDS of CARE:        ACE inhibitor or angiotensin II receptor blocker:  Yes         Statin drug:  Yes        Eye exam within last year: Yes        Influenza vaccine up to date: Yes        Pneumonia vaccine:yes         No family history of medullary thyroid cancer and no history of pancreatitis        I have reviewed the past medical, family and social history    Review of Systems   Constitutional: Negative for appetite change, fatigue, fever and unexpected weight change.   HENT: Negative for sore throat and trouble swallowing.    Eyes: Negative for visual disturbance.   Respiratory: Negative for shortness of breath and wheezing.    Cardiovascular: Negative for chest pain, palpitations and leg swelling.   Gastrointestinal: Positive for abdominal distention. Negative for diarrhea, nausea and vomiting.   Endocrine: Negative for cold intolerance, heat intolerance, polydipsia, polyphagia and polyuria.   Genitourinary: Positive for pelvic pain. Negative for difficulty urinating, dysuria and menstrual problem.   Musculoskeletal: Negative for arthralgias and joint swelling.   Skin: Negative for rash.   Neurological: Negative for dizziness, weakness, numbness and headaches.   Psychiatric/Behavioral: Negative for confusion, dysphoric mood and sleep disturbance.       Objective:      Physical Exam   Constitutional: She is oriented to person, place, and time. She appears well-developed and well-nourished. No distress.   HENT:   Head: Normocephalic and atraumatic.   Right Ear: External ear normal.   Left Ear: External ear normal.   Nose: Nose normal.   Mouth/Throat: Oropharynx is clear and moist. No oropharyngeal exudate.   Eyes: Pupils are equal, round, and reactive to light. Conjunctivae and EOM are normal. No scleral icterus.   Neck: No JVD present. No tracheal deviation present. No thyromegaly present.   Cardiovascular: Normal rate, regular rhythm, normal heart sounds and intact distal pulses. Exam reveals no gallop and no friction rub.   No murmur heard.  Pulmonary/Chest: Effort normal and breath sounds  normal. No respiratory distress. She has no wheezes. She has no rales.   Abdominal: Soft. Bowel sounds are normal. She exhibits distension. She exhibits no mass. There is no tenderness. There is no rebound and no guarding. No hernia.   Musculoskeletal: She exhibits no edema or deformity.   Lymphadenopathy:     She has no cervical adenopathy.   Neurological: She is alert and oriented to person, place, and time. She has normal reflexes. No cranial nerve deficit.   Skin: Skin is warm. No rash noted. She is not diaphoretic. No erythema.   Abdominal striae are pale, no obvious hyperpigmentation   Psychiatric: She has a normal mood and affect. Her behavior is normal.   Vitals reviewed.        Lab Review:   Lab Visit on 10/28/2019   Component Date Value    Sodium 10/28/2019 137     Potassium 10/28/2019 4.3     Chloride 10/28/2019 96     CO2 10/28/2019 26     Glucose 10/28/2019 219*    BUN, Bld 10/28/2019 15     Creatinine 10/28/2019 0.8     Calcium 10/28/2019 10.8*    Anion Gap 10/28/2019 15     eGFR if African American 10/28/2019 >60.0     eGFR if non African Amer* 10/28/2019 >60.0    Lab Visit on 10/17/2019   Component Date Value    Aldosterone 10/17/2019 17.7     Renin 10/17/2019 0.2     Aldosterone/Renin Activi* 10/17/2019 88.5*   Lab Visit on 10/14/2019   Component Date Value    Hemoglobin A1C 10/14/2019 8.4*    Estimated Avg Glucose 10/14/2019 194*    CPK 10/14/2019 43     Sed Rate 10/14/2019 67*    CRP 10/14/2019 31.5*    Rheumatoid Factor 10/14/2019 <10.0     ISAAC Screen 10/14/2019 Negative <1:160    Admission on 10/07/2019, Discharged on 10/07/2019   Component Date Value    WBC 10/07/2019 8.21     RBC 10/07/2019 4.68     Hemoglobin 10/07/2019 12.5     Hematocrit 10/07/2019 42.0     Mean Corpuscular Volume 10/07/2019 90     Mean Corpuscular Hemoglo* 10/07/2019 26.7*    Mean Corpuscular Hemoglo* 10/07/2019 29.8*    RDW 10/07/2019 16.5*    Platelets 10/07/2019 300     MPV 10/07/2019 9.4      Immature Granulocytes 10/07/2019 1.0*    Gran # (ANC) 10/07/2019 4.6     Immature Grans (Abs) 10/07/2019 0.08*    Lymph # 10/07/2019 3.0     Mono # 10/07/2019 0.4     Eos # 10/07/2019 0.1     Baso # 10/07/2019 0.06     nRBC 10/07/2019 0     Gran% 10/07/2019 55.5     Lymph% 10/07/2019 36.3     Mono% 10/07/2019 4.9     Eosinophil% 10/07/2019 1.6     Basophil% 10/07/2019 0.7     Differential Method 10/07/2019 Automated     Sodium 10/07/2019 138     Potassium 10/07/2019 4.3     Chloride 10/07/2019 97     CO2 10/07/2019 25     Glucose 10/07/2019 133*    BUN, Bld 10/07/2019 10     Creatinine 10/07/2019 0.8     Calcium 10/07/2019 10.8*    Total Protein 10/07/2019 8.4     Albumin 10/07/2019 4.1     Total Bilirubin 10/07/2019 0.3     Alkaline Phosphatase 10/07/2019 110     AST 10/07/2019 54*    ALT 10/07/2019 51*    Anion Gap 10/07/2019 16     eGFR if African American 10/07/2019 >60     eGFR if non African Amer* 10/07/2019 >60     Troponin I 10/07/2019 0.007     BNP 10/07/2019 <10    Admission on 10/04/2019, Discharged on 10/04/2019   Component Date Value    WBC 10/04/2019 7.29     RBC 10/04/2019 4.37     Hemoglobin 10/04/2019 12.0     Hematocrit 10/04/2019 39.0     Mean Corpuscular Volume 10/04/2019 89     Mean Corpuscular Hemoglo* 10/04/2019 27.5     Mean Corpuscular Hemoglo* 10/04/2019 30.8*    RDW 10/04/2019 16.5*    Platelets 10/04/2019 309     MPV 10/04/2019 9.7     Immature Granulocytes 10/04/2019 0.4     Gran # (ANC) 10/04/2019 3.8     Immature Grans (Abs) 10/04/2019 0.03     Lymph # 10/04/2019 2.9     Mono # 10/04/2019 0.4     Eos # 10/04/2019 0.2     Baso # 10/04/2019 0.04     nRBC 10/04/2019 0     Gran% 10/04/2019 52.6     Lymph% 10/04/2019 39.4     Mono% 10/04/2019 4.9     Eosinophil% 10/04/2019 2.2     Basophil% 10/04/2019 0.5     Differential Method 10/04/2019 Automated     Sodium 10/04/2019 137     Potassium 10/04/2019 4.0     Chloride 10/04/2019  97     CO2 10/04/2019 29     Glucose 10/04/2019 215*    BUN, Bld 10/04/2019 12     Creatinine 10/04/2019 0.8     Calcium 10/04/2019 9.9     Total Protein 10/04/2019 8.1     Albumin 10/04/2019 3.7     Total Bilirubin 10/04/2019 0.3     Alkaline Phosphatase 10/04/2019 117     AST 10/04/2019 32     ALT 10/04/2019 33     Anion Gap 10/04/2019 11     eGFR if African American 10/04/2019 >60     eGFR if non African Amer* 10/04/2019 >60     Troponin I 10/04/2019 <0.006    Lab Visit on 10/03/2019   Component Date Value    Sodium 10/03/2019 136     Potassium 10/03/2019 4.3     Chloride 10/03/2019 95     CO2 10/03/2019 27     Glucose 10/03/2019 207*    BUN, Bld 10/03/2019 14     Creatinine 10/03/2019 0.9     Calcium 10/03/2019 10.2     Anion Gap 10/03/2019 14     eGFR if African American 10/03/2019 >60.0     eGFR if non African Amer* 10/03/2019 >60.0    Lab Visit on 09/04/2019   Component Date Value    HSV Ab, IgM by EIA 09/04/2019 Negative     HSV 1 IgG 09/04/2019 Positive*    HSV 2 IgG 09/04/2019 Equivocal*    Hepatitis B Surface Ag 09/04/2019 Negative     Hep B C IgM 09/04/2019 Negative     Hep A IgM 09/04/2019 Negative     Hepatitis C Ab 09/04/2019 Negative     RPR 09/04/2019 Non-reactive     HIV 1/2 Ag/Ab 09/04/2019 Negative    Office Visit on 09/04/2019   Component Date Value    HSV PCR Source 09/04/2019 LEFT LABIA MAJORA     HSV1, PCR 09/04/2019 Negative     HSV2, PCR 09/04/2019 Negative    Lab Visit on 08/12/2019   Component Date Value    Uric Acid 08/12/2019 6.0*   Clinical Support on 07/08/2019   Component Date Value    Interpretation 07/08/2019                      Value:  Normal spirometry. (FEV1/VC greater than or equal to LLN and FVC greater than or equal to LLN)  Normal airflow. (FEV1/VC greater than or equal to LLN)  Normal lung volumes. (TLC > or equal to LLN)  Maximum voluntary ventilation is moderately reduced. (MVV% predicted is 51% to 65% of predicted)  Mild reduction  in diffusion capacity -unadjusted for hemoglobin (DLCO > or equal to 60% predicted and < LLN) .    Corrected for alveolar volume diffusion capacity was normal.      Pre FVC 07/08/2019 1.80     Pre FEV1 07/08/2019 1.44     Pre FEV1 FVC 07/08/2019 80.40     Pre FEF 25 75 07/08/2019 1.50     Pre PEF 07/08/2019 4.12     Pre  07/08/2019 7.50     Pre MVV 07/08/2019 39.00*    Pre DLCO 07/08/2019 12.99*    DLCO ADJ PRE 07/08/2019 12.99*    DLCOVA PRE 07/08/2019 5.00     DLVAAdj PRE 07/08/2019 5.00     VA PRE 07/08/2019 2.60*    IVC PRE 07/08/2019 1.71     Pre TLC 07/08/2019 3.33     VC PRE 07/08/2019 1.83     Pre FRC PL 07/08/2019 1.59     Pre ERV 07/08/2019 0.14*    Pre RV 07/08/2019 1.50     RVTLC PRE 07/08/2019 44.96*    Raw PRE 07/08/2019 5.70*    VTGRAWPRE 07/08/2019 1.81     FVC Ref 07/08/2019 2.22     FVC LLN 07/08/2019 1.71     FVC Pre Ref 07/08/2019 80.8     FEV1 Ref 07/08/2019 1.84     FEV1 LLN 07/08/2019 1.40     FEV1 Pre Ref 07/08/2019 78.6     FEV1 FVC Ref 07/08/2019 83     FEV1 FVC LLN 07/08/2019 72     FEV1 FVC Pre Ref 07/08/2019 97.3     FEF 25 75 Ref 07/08/2019 2.09     FEF 25 75 LLN 07/08/2019 1.05     FEF 25 75 Pre Ref 07/08/2019 71.7     PEF Ref 07/08/2019 5.10     PEF LLN 07/08/2019 3.57     PEF Pre Ref 07/08/2019 80.9     MVV Ref 07/08/2019 73     MVV LLN 07/08/2019 62     MVV Pre Ref 07/08/2019 53.2     TLC Ref 07/08/2019 3.58     TLC LLN 07/08/2019 2.60     TLC Pre Ref 07/08/2019 92.8     VC Ref 07/08/2019 2.22     VC LLN 07/08/2019 1.71     VC Pre Ref 07/08/2019 82.3     FRCpleth Ref 07/08/2019 2.23     FRCpleth LLN 07/08/2019 1.41     FRCpleth PreRef 07/08/2019 71.6     ERV Ref 07/08/2019 0.91     ERV LLN 07/08/2019 0.91     ERV Pre Ref 07/08/2019 15.7     RV Ref 07/08/2019 1.32     RV LLN 07/08/2019 0.75     RV Pre Ref 07/08/2019 113.1     RVTLC Ref 07/08/2019 35     RVTLC LLN 07/08/2019 25     RVTLC Pre Ref 07/08/2019 128.7     Raw  Ref 07/08/2019 3.06     Raw LLN 07/08/2019 3.06     Raw Pre Ref 07/08/2019 186.2     DLCO Single Breath Ref 07/08/2019 19.63     DLCO Single Breath LLN 07/08/2019 13.89     DLCO Single Breath Pre R* 07/08/2019 66.2     DLCOc Single Breath Ref 07/08/2019 19.63     DLCOc Single Breath LLN 07/08/2019 13.89     DLCOc Single Breath Pre * 07/08/2019 66.2     DLCOVA Ref 07/08/2019 5.48     DLCOVA LLN 07/08/2019 3.28     DLCOVA Pre Ref 07/08/2019 91.3     DLCOc SBVA Ref 07/08/2019 5.48     DLCOc SBVA LLN 07/08/2019 3.28     DLCOc SBVA Pre Ref 07/08/2019 91.3     VA Single Breath Ref 07/08/2019 3.43     VA Single Breath LLN 07/08/2019 3.43     VA Single Breath Pre Ref 07/08/2019 75.7     IVC Single Breath Ref 07/08/2019 2.22     IVC Single Breath LLN 07/08/2019 1.71     IVC Single Breath Pre Ref 07/08/2019 77.1    There may be more visits with results that are not included.       Assessment:     1. Type 2 diabetes, uncontrolled, with neuropathy  POCT Glucose, Hand-Held Device    TOUJEO SOLOSTAR U-300 INSULIN 300 unit/mL (1.5 mL) InPn pen   2. Vulvovaginitis  TOUJEO SOLOSTAR U-300 INSULIN 300 unit/mL (1.5 mL) InPn pen   3. Essential hypertension     4. Open wound of skin  TOUJEO SOLOSTAR U-300 INSULIN 300 unit/mL (1.5 mL) InPn pen   5. Abdominal distension  US Abdomen Complete   6. Elevated liver enzymes  US Abdomen Complete   7. Pelvic pain in female  US Abdomen Complete   8. Flank pain  US Abdomen Complete    diabetes has worsened.  She admits her diet also has worsened.  Counseled regarding avoiding excessive carbohydrates and fat in diet.  I noticed her last triglyceride level went up.  Try to limit to no more than 45 g of carbs per meal and will increase insulin.  I would like to switch her to a more concentrated insulin toujeo which may work better for her but in the meantime will increase Levemir to 55 units twice a day, increase NovoLog to 15 units plus small sliding scale at meals and continue  metformin and Victoza   Stop jardiance as directed at last visit as this may help to reduce her frequency of yeast infections though I advised her hyperglycemia can also increase the risk of yeast infection    Unsure what is causing her abdominal distension which is associated with flank and pelvic pain and discomfort and she has recently had elevated liver enzymes.  CT of the abdomen was denied so I will order abdominal ultrasound for further evaluation  Plan:   Type 2 diabetes, uncontrolled, with neuropathy  -     POCT Glucose, Hand-Held Device  -     TOUJEO SOLOSTAR U-300 INSULIN 300 unit/mL (1.5 mL) InPn pen; Inject 75 units daily  Dispense: 4.5 mL; Refill: 3    Vulvovaginitis  -     TOUJEO SOLOSTAR U-300 INSULIN 300 unit/mL (1.5 mL) InPn pen; Inject 75 units daily  Dispense: 4.5 mL; Refill: 3    Essential hypertension    Open wound of skin  -     TOUJEO SOLOSTAR U-300 INSULIN 300 unit/mL (1.5 mL) InPn pen; Inject 75 units daily  Dispense: 4.5 mL; Refill: 3    Abdominal distension  -     US Abdomen Complete; Future; Expected date: 11/11/2019    Elevated liver enzymes  -     US Abdomen Complete; Future; Expected date: 11/11/2019    Pelvic pain in female  -     US Abdomen Complete; Future; Expected date: 11/11/2019    Flank pain  -     US Abdomen Complete; Future; Expected date: 11/11/2019    Other orders  -     fluconazole (DIFLUCAN) 150 MG Tab; Take 1 tablet (150 mg total) by mouth once daily. for 1 day  Dispense: 1 tablet; Refill: 1          Follow up in about 2 months (around 1/11/2020) for diabetes.    Labs prior to appointment? not applicable     Disclaimer:  This note may have been partially prepared using voice recognition software and  it may have not been extensively proofed, as such there could be errors within the text such as sound alike errors.

## 2019-11-11 NOTE — PATIENT INSTRUCTIONS
Increase Levemir to 55  units twice a day units     Take Novolog 15 units before meals plus scale:     Add correction using Humalog, Novolog, or Apidra:  Blood sugar 130 to 200 add 3 units  Blood sugar 201 to 250 add 4units  Blood sugar 251 to 300 add 4 units  Blood sugar greater than 300 add 5units     Stay on metformin and victoza    When you start toujeo stop levemir and take toujeo 75 units once a day

## 2019-11-13 ENCOUNTER — TELEPHONE (OUTPATIENT)
Dept: RADIOLOGY | Facility: HOSPITAL | Age: 49
End: 2019-11-13

## 2019-11-13 ENCOUNTER — NUTRITION (OUTPATIENT)
Dept: DIABETES | Facility: CLINIC | Age: 49
End: 2019-11-13
Payer: MEDICAID

## 2019-11-13 VITALS — BODY MASS INDEX: 45.17 KG/M2 | WEIGHT: 200.81 LBS | HEIGHT: 56 IN

## 2019-11-13 DIAGNOSIS — Z79.4 TYPE 2 DIABETES MELLITUS WITH OTHER SPECIFIED COMPLICATION, WITH LONG-TERM CURRENT USE OF INSULIN: ICD-10-CM

## 2019-11-13 DIAGNOSIS — E11.65 TYPE 2 DIABETES MELLITUS WITH HYPERGLYCEMIA, WITH LONG-TERM CURRENT USE OF INSULIN: Chronic | ICD-10-CM

## 2019-11-13 DIAGNOSIS — Z79.4 TYPE 2 DIABETES MELLITUS WITH HYPERGLYCEMIA, WITH LONG-TERM CURRENT USE OF INSULIN: Chronic | ICD-10-CM

## 2019-11-13 DIAGNOSIS — E11.69 TYPE 2 DIABETES MELLITUS WITH OTHER SPECIFIED COMPLICATION, WITH LONG-TERM CURRENT USE OF INSULIN: ICD-10-CM

## 2019-11-13 PROCEDURE — 99215 OFFICE O/P EST HI 40 MIN: CPT | Mod: PBBFAC | Performed by: DIETITIAN, REGISTERED

## 2019-11-13 PROCEDURE — 99999 PR PBB SHADOW E&M-EST. PATIENT-LVL V: CPT | Mod: PBBFAC,,, | Performed by: DIETITIAN, REGISTERED

## 2019-11-13 PROCEDURE — 99999 PR PBB SHADOW E&M-EST. PATIENT-LVL V: ICD-10-PCS | Mod: PBBFAC,,, | Performed by: DIETITIAN, REGISTERED

## 2019-11-13 PROCEDURE — G0108 DIAB MANAGE TRN  PER INDIV: HCPCS | Mod: PBBFAC | Performed by: DIETITIAN, REGISTERED

## 2019-11-13 RX ORDER — DEXTROSE 4 G
TABLET,CHEWABLE ORAL
Qty: 1 EACH | Refills: 1 | Status: SHIPPED | OUTPATIENT
Start: 2019-11-13 | End: 2020-12-14 | Stop reason: SDUPTHER

## 2019-11-13 NOTE — LETTER
November 13, 2019       DOMINIK Beach MD  [address]  [place] [address]             [facility]  [address] [address]  Phone: [phone]  Fax: [phone]   Patient: Yolanda Betts   MR Number: [id number]   YOB: 1970   Date of Visit: 11/13/2019       Dear Dr. Beach:    Thank you for referring Yloanda Betts to me for evaluation. Below are the relevant portions of my assessment and plan of care.    If you have questions, please do not hesitate to call me. I look forward to following Yolanda along with you.    Sincerely,      Yara Sanchez, SOILA, CDE           CC  No Recipients

## 2019-11-13 NOTE — PROGRESS NOTES
Diabetes Education  Author: Yara Sanchez RD, CDE  Date: 11/13/2019    Diabetes Care Management Summary  Diabetes Education Record Assessment/Progress: Comprehensive/Group  Current Diabetes Risk Level: Moderate     Last A1c:   Lab Results   Component Value Date    HGBA1C 8.4 (H) 10/14/2019     Initial visit: [place] 6/13/19 w/ A1C 7.7 (6/24/19), 8.4 (10/14/19)  Noted current treatment for abscess to right flank. Reviewed digital htn records and noted elevations in BP.     Diabetes Type  Diabetes Type : Type II    Diabetes History  Diabetes Diagnosis: >10 years(>20yrs)  Current Treatment: Diet, Exercise, Oral Medication, Injectable, Insulin(Metformin 1000mg 1tab twice daily, jardiance 10mg daily, victoza 1.8 mg daily, toujeo 76 units daily, novolog ac (15 base +s/s) w/ usual dose 18-20 units ac)  Reviewed Problem List with Patient: Yes    Health Maintenance was reviewed today with patient. Discussed with patient importance of routine eye exams, foot exams/foot care, blood work (i.e.: A1c, microalbumin, and lipid), dental visits, yearly flu vaccine, and pneumonia vaccine as indicated by PCP. Patient verbalized understanding.     Health Maintenance Topics with due status: Not Due       Topic Last Completion Date    TETANUS VACCINE 12/21/2017    Colonoscopy 02/28/2018    Lipid Panel 04/25/2019    Mammogram 05/02/2019    Foot Exam 05/16/2019    Eye Exam 05/22/2019    Hemoglobin A1c 10/14/2019     There are no preventive care reminders to display for this patient.    Nutrition  Meal Planning: (Intake ~9993-1265 cals/d. Excess carb, fat and sodium from fast foods, sugary marco, seasonings. Inadequate nonstarchy veg. No use MR shake/entree. Limited freezer space and needing quick meal alternatives since recent illness & decreased energy level. )  Meal Plan 24 Hour Recall - Breakfast: 1pkt instant oatmeal w/ 1/4 cup rolled oats OR eggs, 2slc toast (wheat) - water  Meal Plan 24 Hour Recall - Lunch: hambuger, fries (fast  foods)  Meal Plan 24 Hour Recall - Dinner: Raimen noodles OR chinese dumplings w/ meat (8)  Meal Plan 24 Hour Recall - Snack: fruit; marco: grapefruit isabella, gingerale     Monitoring   Self Monitoring : Per recall since starting novolog ac, fst -270; acl 200-270; 250-300.   Blood Glucose Logs: No  In the last month, how often have you had a low blood sugar reaction?: never    Exercise   Frequency: Never    Current Diabetes Treatment   Current Treatment: Diet, Exercise, Oral Medication, Injectable, Insulin(Metformin 1000mg 1tab twice daily, jardiance 10mg daily, victoza 1.8 mg daily, toujeo 76 units daily, novolog ac (15 base +s/s) w/ usual dose 18-20 units ac)    Social History  Preferred Learning Method: Face to Face  Primary Support: Self  Smoking Status: Never a Smoker  Alcohol Use: Rarely     Barriers to Change  Barriers to Change: None  Learning Challenges : None    Readiness to Learn   Readiness to Learn : Eager    Cultural Influences  Cultural Influences: No    Diabetes Education Assessment/Progress  Diabetes Disease Process (diabetes disease process and treatment options): Discussion, Individual Session, Demonstrates Understanding/Competency(verbalizes/demonstrates), Written Materials Provided  Nutrition (Incorporating nutritional management into one's lifestyle): Discussion, Individual Session, Demonstrates Understanding/Competency (verbalizes/demonstrates), Written Materials Provided  Physical Activity (incorporating physical activity into one's lifestyle): Discussion, Individual Session, Demonstrates Understanding/Competency (verbalizes/demonstrates), Written Materials Provided  Medications (states correct name, dose, onset, peak, duration, side effects & timing of meds): Discussion, Individual Session, Demonstrates Understanding/Competency(verbalizes/demonstrates), Written Materials Provided  Monitoring (monitoring blood glucose/other parameters & using results): Discussion, Individual Session,  Demonstrates Understanding/Competency (verbalizes/demonstrates), Written Materials Provided  Acute Complications (preventing, detecting, and treating acute complications): Discussion, Individual Session, Demonstrates Understanding/Competency (verbalizes/demonstrates), Written Materials Provided  Chronic Complications (preventing, detecting, and treating chronic complications): Demonstrates Understanding/Competency (verbalizes/demonstrates)  Clinical (diabetes, other pertinent medical history, and relevant comorbidities reviewed during visit): Demonstrates Understanding/Competency (verbalizes/demonstrates)  Cognitive (knowledge of self-management skills, functional health literacy): Demonstrates Understanding/Competency (verbalizes/demonstrates)  Psychosocial (emotional response to diabetes): Not Covered/Deferred  Diabetes Distress and Support Systems: Not Covered/Deferred  Behavioral (readiness for change, lifestyle practices, self-care behaviors): Discussion, Individual Session, Demonstrates Understanding/Competency (verbalizes/demonstrates)    Goals  Patient has selected/evaluated goals during today's session: Yes, evaluated  Healthy Eating: Set(continue use meal plan - decrease reg marco 3x/wk)  Met Percentage : 25%  Start Date: 11/13/19(use meal plan - carb portions/spacing, low-sodium seasonings (custom written seasoning, food/meal/marco lists); avoid fast foods and sugary marco; MR shake 1-2 x/d for meal regularity and improve protein intake for wound healing.)  Target Date: 12/18/19  Physical Activity: % Met(150min/wk - walking prog)  Met Percentage : 0%  Monitoring: Set(test BG 2x/d -fst, 2hr pp/bed; bring meter to clinic)  Met Percentage : 50%  Start Date: 11/13/19(test BG fst, acl, acd, bed; bring records to clinic; upload to [employer] weekly)  Target Date: 12/18/19     Diabetes Care Plan/Intervention  Education Plan/Intervention: Individual Follow-Up DSMT, Endocrine Provider Visit Set Up(Maintain visits w/   [facility].  Pt agrees to upload BG logs to [employer] weekly. )    Diabetes Meal Plan  Restrictions: Low Fat, Low Sodium  Calories: 1400, 1500  Carbohydrate Per Meal: 30-45g  Carbohydrate Per Snack : 15-30g   75-80 gram pro/d - 1-2 oral supplements daily since current oral intake inadequate  Vit C 1000mg daily      Today's Self-Management Care Plan was developed with the patient's input and is based on barriers identified during today's assessment.  The long and short-term goals in the care plan were written with the patient/caregiver's input. The patient has agreed to work toward these goals to improve her overall diabetes control.    The patient received a copy of today's self-management plan and verbalized understanding of the care plan, goals, and all of today's instructions.    The patient was encouraged to communicate with her physician and care team regarding her condition(s) and treatment.  I provided the patient with my contact information today and encouraged her to contact me via phone or patient portal as needed.     Education Units of Time   Time Spent: 60 min

## 2019-11-14 ENCOUNTER — HOSPITAL ENCOUNTER (OUTPATIENT)
Dept: RADIOLOGY | Facility: HOSPITAL | Age: 49
Discharge: HOME | End: 2019-11-14
Attending: INTERNAL MEDICINE
Payer: MEDICAID

## 2019-11-14 DIAGNOSIS — R10.9 FLANK PAIN: ICD-10-CM

## 2019-11-14 DIAGNOSIS — R14.0 ABDOMINAL DISTENSION: ICD-10-CM

## 2019-11-14 DIAGNOSIS — R74.8 ELEVATED LIVER ENZYMES: ICD-10-CM

## 2019-11-14 DIAGNOSIS — R10.2 PELVIC PAIN IN FEMALE: ICD-10-CM

## 2019-11-14 PROCEDURE — 76700 US EXAM ABDOM COMPLETE: CPT | Mod: TC

## 2019-11-14 PROCEDURE — 76700 US ABDOMEN COMPLETE: ICD-10-PCS | Mod: 26,,, | Performed by: RADIOLOGY

## 2019-11-14 PROCEDURE — 76700 US EXAM ABDOM COMPLETE: CPT | Mod: 26,,, | Performed by: RADIOLOGY

## 2019-11-17 ENCOUNTER — PATIENT MESSAGE (OUTPATIENT)
Dept: ENDOCRINOLOGY | Facility: CLINIC | Age: 49
End: 2019-11-17

## 2019-11-18 ENCOUNTER — TELEPHONE (OUTPATIENT)
Dept: CARDIOLOGY | Facility: CLINIC | Age: 49
End: 2019-11-18

## 2019-11-18 ENCOUNTER — LAB VISIT (OUTPATIENT)
Dept: LAB | Facility: HOSPITAL | Age: 49
End: 2019-11-18
Attending: NURSE PRACTITIONER
Payer: MEDICAID

## 2019-11-18 DIAGNOSIS — I10 ESSENTIAL HYPERTENSION: Chronic | ICD-10-CM

## 2019-11-18 DIAGNOSIS — E26.9 HYPERALDOSTERONISM: ICD-10-CM

## 2019-11-18 DIAGNOSIS — Z79.4 TYPE 2 DIABETES MELLITUS WITH HYPERGLYCEMIA, WITH LONG-TERM CURRENT USE OF INSULIN: Chronic | ICD-10-CM

## 2019-11-18 DIAGNOSIS — E11.65 TYPE 2 DIABETES MELLITUS WITH HYPERGLYCEMIA, WITH LONG-TERM CURRENT USE OF INSULIN: Chronic | ICD-10-CM

## 2019-11-18 DIAGNOSIS — E83.52 HYPERCALCEMIA: ICD-10-CM

## 2019-11-18 LAB
ANION GAP SERPL CALC-SCNC: 9 MMOL/L (ref 8–16)
BUN SERPL-MCNC: 10 MG/DL (ref 6–20)
CA-I BLDV-SCNC: 1.29 MMOL/L (ref 1.06–1.42)
CALCIUM SERPL-MCNC: 10.3 MG/DL (ref 8.7–10.5)
CHLORIDE SERPL-SCNC: 99 MMOL/L (ref 95–110)
CO2 SERPL-SCNC: 28 MMOL/L (ref 23–29)
CREAT SERPL-MCNC: 0.7 MG/DL (ref 0.5–1.4)
EST. GFR  (AFRICAN AMERICAN): >60 ML/MIN/1.73 M^2
EST. GFR  (NON AFRICAN AMERICAN): >60 ML/MIN/1.73 M^2
ESTIMATED AVG GLUCOSE: 237 MG/DL (ref 68–131)
GLUCOSE SERPL-MCNC: 193 MG/DL (ref 70–110)
HBA1C MFR BLD HPLC: 9.9 % (ref 4–5.6)
POTASSIUM SERPL-SCNC: 5.3 MMOL/L (ref 3.5–5.1)
SODIUM SERPL-SCNC: 136 MMOL/L (ref 136–145)

## 2019-11-18 PROCEDURE — 83036 HEMOGLOBIN GLYCOSYLATED A1C: CPT

## 2019-11-18 PROCEDURE — 82330 ASSAY OF CALCIUM: CPT

## 2019-11-18 PROCEDURE — 36415 COLL VENOUS BLD VENIPUNCTURE: CPT

## 2019-11-18 PROCEDURE — 80048 BASIC METABOLIC PNL TOTAL CA: CPT

## 2019-11-18 RX ORDER — SPIRONOLACTONE 25 MG/1
25 TABLET ORAL DAILY
Qty: 30 TABLET | Refills: 1 | Status: SHIPPED | OUTPATIENT
Start: 2019-11-18 | End: 2019-12-23 | Stop reason: SDUPTHER

## 2019-11-18 NOTE — TELEPHONE ENCOUNTER
----- Message from Sofia Caal sent at 11/18/2019  4:25 PM CST -----  Contact: pt  Pt asked that nurse contact her regarding a different medication. Please contact pt at ([phone])

## 2019-11-18 NOTE — TELEPHONE ENCOUNTER
----- Message from Tomasa Nation sent at 11/18/2019  1:51 PM CST -----  Contact: Patient   Patient would like a call back at [phone], Regards to her gall stone.    Thanks  Td

## 2019-11-18 NOTE — TELEPHONE ENCOUNTER
----- Message from Sofia Caal sent at 11/18/2019  4:26 PM CST -----  Contact: Pt  Pt asked that nurse contact her regarding her test results.

## 2019-11-18 NOTE — TELEPHONE ENCOUNTER
I will send a message to Miss Buffy Root to review the BMP from today as well as a request for a new prescription of spironolactone.

## 2019-11-18 NOTE — TELEPHONE ENCOUNTER
Spoke with patient and she stated that she would like Dr. Beach to look at her ultrasound results that show she has gall stones and also her labs that were done today because she is waiting on her refill of Spironolactone. She wants to know what Dr. Beach recommends about her results.

## 2019-11-19 ENCOUNTER — PATIENT MESSAGE (OUTPATIENT)
Dept: INTERNAL MEDICINE | Facility: CLINIC | Age: 49
End: 2019-11-19

## 2019-11-19 ENCOUNTER — PATIENT MESSAGE (OUTPATIENT)
Dept: DIABETES | Facility: CLINIC | Age: 49
End: 2019-11-19

## 2019-11-19 ENCOUNTER — TELEPHONE (OUTPATIENT)
Dept: DIABETES | Facility: CLINIC | Age: 49
End: 2019-11-19

## 2019-11-19 DIAGNOSIS — I10 ESSENTIAL HYPERTENSION: Primary | Chronic | ICD-10-CM

## 2019-11-19 RX ORDER — HYDRALAZINE HYDROCHLORIDE 25 MG/1
25 TABLET, FILM COATED ORAL EVERY 12 HOURS
Qty: 60 TABLET | Refills: 11 | Status: SHIPPED | OUTPATIENT
Start: 2019-11-19 | End: 2019-12-30 | Stop reason: DRUGHIGH

## 2019-11-19 NOTE — TELEPHONE ENCOUNTER
Please let her know I gave her a refill, but future refills will need to come from cardiology and remind her to keep her cardiology appointment highlighted below. General surgeon Dr. Driscoll can discuss treatment of her gallstones. We'll discuss any additional treatment changes at her next appointment, currently scheduled for Thursday, January 16, 2020 at 2:00 PM. Offer to reschedule the appointment on 1/10 or 1/13 to consolidate her appointments.    Requested Prescriptions     Signed Prescriptions Disp Refills    spironolactone (ALDACTONE) 25 MG tablet 30 tablet 1     Sig: Take 1 tablet (25 mg total) by mouth once daily.     Authorizing Provider: DOMINIK SHEN       Future Appointments   Date Time Provider Department Center   12[facility]16/2019  2:00 PM [facility]    12/18/2019 10:30 AM Yara Sanchez RD, CDE [facility] DIABETE [facility]   12/18/2019 11:00 AM KELI Chávez [facility]ARDIO [facility]   1/10/2020 10:00 AM Abilio Gibbs MD [facility] RHEUM [facility]   1/13/2020  2:00 PM Malka Severino MD [facility] ENDO [facility]   1/16/2020  2:00 PM DOMINIK Shen MD [facility]

## 2019-11-19 NOTE — TELEPHONE ENCOUNTER
Pt stopped taking spironolactone per Buffy May NP and pt has been having high BP since since stopping.   BP today 142/102  Please advise

## 2019-11-19 NOTE — TELEPHONE ENCOUNTER
Spoke with pt. She is requesting lower potassium food guide. Will send to her via email since unable to attach in [employer]. Pt verbalized understanding and will maintain contact w/ clinic prn.

## 2019-11-19 NOTE — TELEPHONE ENCOUNTER
Can she continue to log her bp before her next visit   Make sure that she is following a low salt diet   She should start hydralazine 25 mg twice a day      Pt states she is not feeling well with her bp so high, she has digital medicine her BP log is in flow sheets.

## 2019-11-19 NOTE — TELEPHONE ENCOUNTER
Spoke with patient and informed her per Dr. Beach of her refill of spironolactone and that she will need to get further refills from her cardiologist. Also informed her that Dr. Driscoll will be the one to speak with her about the gall stones and what needs to be done. She verbalized understanding.

## 2019-11-19 NOTE — TELEPHONE ENCOUNTER
The patient has been notified of this information and all questions answered.      Please notify patient that her calcium level has returned to normal since stopping hctz. Her potassium is on higher end and we will not continue aldactone at this time. She needs to keep her follow up appt. she should also avoid foods rich in potassium such as bananas and potatoes.

## 2019-11-20 DIAGNOSIS — G43.119 INTRACTABLE MIGRAINE WITH AURA WITHOUT STATUS MIGRAINOSUS: ICD-10-CM

## 2019-11-20 RX ORDER — PEN NEEDLE, DIABETIC 30 GX3/16"
NEEDLE, DISPOSABLE MISCELLANEOUS
Qty: 200 EACH | Refills: 3 | Status: SHIPPED | OUTPATIENT
Start: 2019-11-20 | End: 2019-12-17 | Stop reason: SDUPTHER

## 2019-11-23 RX ORDER — FROVATRIPTAN SUCCINATE 2.5 MG/1
TABLET, FILM COATED ORAL
Qty: 9 TABLET | Refills: 2 | Status: SHIPPED | OUTPATIENT
Start: 2019-11-23 | End: 2020-02-04

## 2019-11-26 ENCOUNTER — TELEPHONE (OUTPATIENT)
Dept: CARDIOLOGY | Facility: CLINIC | Age: 49
End: 2019-11-26

## 2019-11-26 NOTE — PROGRESS NOTES
Subjective:   Patient ID:  Yolanda Betts is a 49 y.o. female who presents for evaluation of Hypertension and Risk Factor Management For Atherosclerosis      HPI     Yolanda Betts is a 49 year old female who presents to clinic for BP follow up. Her current medical conditions include HTN, HLP, DM, GERD, Morbid Obesity, ALEN. She returns today and states she is doing ok. She reports issues with dizzy spells and headaches recently with her BP going up and down. BP well controlled today in office.     Denies chest pain or anginal equivalents. No shortness of breath, COATS or palpitations. Denies orthopnea, PND or abdominal bloating. Reports regular walking without any issues lately. NO leg swelling or claudications. No recent falls, syncope or near syncopal events. Reports compliance with medications and dietary restrictions. NO CNS complaints to suggest TIA or CVA today. No signs of abnormal bleeding on ASA daily.     Patient's recent labs revealed K+ 5.3 and she has had issues with food choices since that time and BP swings.       Recent Readings 11/26/2019 11/25/2019 11/24/2019 11/23/2019   SBP (mmHg) 152 150 168 145  (External Source: [employer])   DBP (mmHg) 100 92 102 98  (External Source: [employer])   Pulse 90 87 109 92  (External Source: [employer])     Recent Readings 11/23/2019 11/22/2019 11/22/2019 11/21/2019   SBP (mmHg) 136 133 135 143   DBP (mmHg) 83 95 87 97   Pulse 96 95 98 97     Recent Readings 11/21/2019 11/20/2019 11/20/2019 11/20/2019   SBP (mmHg) 133 133 130 132   DBP (mmHg) 96 95 88 91   Pulse 90 96 99 85     Recent Readings 11/19/2019 11/19/2019   SBP (mmHg) 138 142   DBP (mmHg) 93 102   Pulse 92 86     Past Medical History:   Diagnosis Date    Abnormal Pap smear of cervix     HPV genital warts    Anemia     Anxiety     Arthritis     Asthma 10/11/2016    Bipolar 1 disorder     Diabetes mellitus, type 2     Dyslipidemia associated with type 2 diabetes mellitus  2019    Genital warts     GERD (gastroesophageal reflux disease)     Herpes simplex virus (HSV) infection     Hypertension     Hypertension complicating diabetes 2019    Mild persistent asthma without complication 10/11/2016    Morbid obesity with body mass index (BMI) of 45.0 to 49.9 in adult 8/3/2017    Obstructive sleep apnea     Schizoaffective disorder, bipolar type 2019    Seasonal allergic rhinitis due to pollen 2019    Type 2 diabetes mellitus with hyperglycemia, with long-term current use of insulin 2017    Type 2 diabetes mellitus with hyperglycemia, without long-term current use of insulin 2017       Past Surgical History:   Procedure Laterality Date    abcess removed right back      BELT ABDOMINOPLASTY      BREAST SURGERY Bilateral     Reduction     SECTION      X 2    COLONOSCOPY      COLONOSCOPY N/A 2018    Procedure: colonoscopy for iron deficiency anemia;  Surgeon: Frankie Sanchez MD;  Location: [facility];  Service: Endoscopy;  Laterality: N/A;    COLONOSCOPY N/A 2018    Procedure: COLONOSCOPY;  Surgeon: Frankie Sanchez MD;  Location: [facility];  Service: Endoscopy;  Laterality: N/A;    HYSTERECTOMY      w/ BSO; hypermenorrhea    MOUTH SURGERY      OOPHORECTOMY      hyst/bso, hypermenorrhea    TUBAL LIGATION         Social History     Tobacco Use    Smoking status: Never Smoker    Smokeless tobacco: Never Used   Substance Use Topics    Alcohol use: Yes     Alcohol/week: 0.0 standard drinks     Comment: socially    Drug use: No       Family History   Problem Relation Age of Onset    Diabetes Mother     Hyperlipidemia Mother     Hypertension Mother     Asthma Mother     COPD Mother     Glaucoma Mother     Thyroid disease Mother     Hypertension Father     Hyperlipidemia Father     Cancer Father         Brain, lung, liver, kidney    Heart disease Maternal Grandmother     Hyperlipidemia Maternal  Grandmother     Hypertension Maternal Grandmother     Cataracts Maternal Grandmother     Diabetes Maternal Grandmother     Heart disease Maternal Grandfather     Hyperlipidemia Maternal Grandfather     Hypertension Maternal Grandfather     Glaucoma Maternal Grandfather     Cancer Maternal Grandfather     Cataracts Maternal Grandfather     Macular degeneration Maternal Grandfather     Diabetes Maternal Grandfather     Heart disease Paternal Grandmother     Hyperlipidemia Paternal Grandmother     Hypertension Paternal Grandmother     Cataracts Paternal Grandmother     Heart disease Paternal Grandfather     Hyperlipidemia Paternal Grandfather     Hypertension Paternal Grandfather     Cataracts Paternal Grandfather     Breast cancer Maternal Cousin     Breast cancer Maternal Cousin     Breast cancer Maternal Cousin     Breast cancer Maternal Cousin      Wt Readings from Last 3 Encounters:   11/27/19 92.4 kg (203 lb 11.3 oz)   11/13/19 91.1 kg (200 lb 13.4 oz)   11/11/19 91.4 kg (201 lb 8 oz)     Temp Readings from Last 3 Encounters:   11/11/19 99.1 °F (37.3 °C) (Tympanic)   10/31/19 98.5 °F (36.9 °C)   10/28/19 97 °F (36.1 °C) (Tympanic)     BP Readings from Last 3 Encounters:   11/27/19 128/80   11/11/19 130/82   11/04/19 126/88     Pulse Readings from Last 3 Encounters:   11/27/19 88   11/11/19 94   11/04/19 106     Current Outpatient Medications on File Prior to Visit   Medication Sig Dispense Refill    albuterol (PROVENTIL/VENTOLIN HFA) 90 mcg/actuation inhaler Inhale 2 puffs into the lungs every 4 (four) hours as needed. 8.5 g 5    ALPRAZolam (XANAX) 2 MG Tab Take 2 mg by mouth 2 (two) times daily.       ALPRAZolam (XANAX) 2 MG Tab Take 1 tablet by mouth twice daily 60 tablet 0    ALPRAZolam (XANAX) 2 MG Tab Take 1 tablet by mouth twice daily 60 tablet 0    amLODIPine (NORVASC) 10 MG tablet Take 10 mg by mouth once daily.      ARIPiprazole (ABILIFY) 10 MG Tab Take 1 tablet by mouth  "daily 30 tablet 0    ARIPiprazole (ABILIFY) 5 MG Tab Take 1 tablet by mouth every day 30 tablet 0    aspirin (ECOTRIN) 81 MG EC tablet Take 81 mg by mouth once daily.      atorvastatin (LIPITOR) 20 MG tablet Take 1 tablet (20 mg total) by mouth every evening. 90 tablet 3    BD INSULIN SYRINGE ULTRA-FINE 1/2 mL 30 gauge x 1/2" Syrg   0    benztropine (COGENTIN) 1 MG tablet Take 1 mg by mouth nightly.  1    benztropine (COGENTIN) 1 MG tablet Take 1 tablet by mouth at bedtime 30 tablet 0    benztropine (COGENTIN) 1 MG tablet Take 1 tablet by mouth at bedtime 30 tablet 0    blood sugar diagnostic Strp Check blood glucose 4 times daily as directed and as needed (dispense insurance preferred brand or patient choice) 200 each 5    blood-glucose meter Misc Use to check blood sugars, give meter based on insurance specification 1 each 1    cloNIDine (CATAPRES) 0.1 MG tablet Take 1 tablet (0.1 mg total) by mouth every 6 (six) hours as needed (for SBP>150 or DBP>100 mmHg). 60 tablet 1    cycloSPORINE (RESTASIS) 0.05 % ophthalmic emulsion Place 0.4 mLs (1 drop total) into both eyes 2 (two) times daily. 60 each 11    DULoxetine (CYMBALTA) 30 MG capsule Take 1 capsule by mouth twice daily 60 capsule 0    duloxetine (CYMBALTA) 60 MG capsule Take 60 mg by mouth 2 (two) times daily.  2    DULoxetine (CYMBALTA) 60 MG capsule Take 1 capsule by mouth twice daily 60 capsule 0    empagliflozin (JARDIANCE) 10 mg Tab Take 1 tablet (10 mg) by mouth every morning. 30 tablet 5    ergocalciferol (ERGOCALCIFEROL) 50,000 unit Cap Take 1 capsule (50,000 Units total) by mouth every 14 (fourteen) days. 4 capsule 5    eszopiclone (LUNESTA) 3 mg Tab Take 1 tablet by mouth at bedtime 30 tablet 0    fish oil-omega-3 fatty acids 300-1,000 mg capsule Take 1 capsule by mouth once daily.      fluticasone propionate (FLONASE) 50 mcg/actuation nasal spray 2 sprays (100 mcg total) by Each Nare route once daily. 16 g 11    frovatriptan " (FROVA) 2.5 MG tablet Take one tablet orally. If recurs, may repeat after 2 hours. Max of 3 tabs in 24 hours. 9 tablet 2    gabapentin (NEURONTIN) 300 MG capsule Take 2 capsules (600 mg total) by mouth 3 (three) times daily. (Patient taking differently: Take 300 mg by mouth 2 (two) times daily. ) 90 capsule 3    hydrALAZINE (APRESOLINE) 25 MG tablet Take 1 tablet (25 mg total) by mouth every 12 (twelve) hours. 60 tablet 11    insulin aspart U-100 (NOVOLOG FLEXPEN U-100 INSULIN) 100 unit/mL (3 mL) InPn pen Inject before meals TID AC up to 30 units daily 15 mL 3    insulin detemir U-100 (LEVEMIR FLEXTOUCH) 100 unit/mL (3 mL) SubQ InPn pen Inject 76 Units into the skin every evening. 25 mL 3    lancets 28 gauge Misc use as directed 3 times daily 100 each 11    LATUDA 120 mg Tab       LINZESS 145 mcg Cap capsule Take 145 mcg by mouth as needed.   3    liraglutide 0.6 mg/0.1 mL, 18 mg/3 mL, subq PNIJ (VICTOZA 2-MILENA) 0.6 mg/0.1 mL (18 mg/3 mL) PnIj Use 1.8mg under the skin daily 9 mL 3    lurasidone (LATUDA) 120 mg Tab Take 1 tablet by mouth at 5 pm with food 30 tablet 0    metFORMIN (GLUCOPHAGE) 1000 MG tablet TAKE 1 TABLET (1,000 MG TOTAL) BY MOUTH 2 (TWO) TIMES DAILY. 180 tablet 11    metoprolol succinate (TOPROL-XL) 100 MG 24 hr tablet Take 2 tablets (200 mg total) by mouth every evening. 180 tablet 3    mirtazapine (REMERON) 45 MG tablet Take 45 mg by mouth every evening.      mirtazapine (REMERON) 45 MG tablet Take 1 tablet by mouth at bedtime 30 tablet 0    mometasone (ASMANEX TWISTHALER) 220 mcg (120 doses) AePB Inhale 2 puffs into the lungs 2 (two) times daily. Controller 1 each 11    montelukast (SINGULAIR) 10 mg tablet Take 1 tablet (10 mg total) by mouth nightly. 30 tablet 11    MULTIVIT,CALC,MINS/IRON/FOLIC (DAILY MULTIPLE FOR WOMEN ORAL) Take 1 tablet by mouth once daily.      mupirocin (BACTROBAN) 2 % ointment Apply topically 3 (three) times daily. 30 g 1    nitroGLYCERIN (NITROSTAT) 0.4  "MG SL tablet Dissolve 1 tablet under the tongue every 5 minutes as needed, up to 3 times. If chest pain persists, call 911.      nystatin-triamcinolone (MYCOLOG II) cream Apply to affected area 2 times daily 30 g 1    oxyCODONE-acetaminophen (PERCOCET) 7.5-325 mg per tablet Take 1 tablet by mouth every 6 (six) hours as needed for Pain. 20 tablet 0    pen needle, diabetic (BD ULTRA-FINE MINI PEN NEEDLE) 31 gauge x 3/16" Ndle Use 5 a day 200 each 3    promethazine (PHENERGAN) 25 MG tablet Take 1 tablet (25 mg total) by mouth every 8 (eight) hours as needed for Nausea. sedating 14 tablet 0    spironolactone (ALDACTONE) 25 MG tablet Take 1 tablet (25 mg total) by mouth once daily. 30 tablet 1    TOUJEO SOLOSTAR U-300 INSULIN 300 unit/mL (1.5 mL) InPn pen Inject 75 units daily 4.5 mL 3    traMADol (ULTRAM) 50 mg tablet Take 1 tablet (50 mg total) by mouth every 6 (six) hours as needed. 12 tablet 0    valACYclovir (VALTREX) 1000 MG tablet Take 1 tablet (1,000 mg total) by mouth once daily. 30 tablet 11    valsartan (DIOVAN) 320 MG tablet Take 1 tablet (320 mg total) by mouth once daily. 90 tablet 3    acyclovir (ZOVIRAX) 400 MG tablet Take 1 tablet (400 mg total) by mouth 3 (three) times daily. for 10 days 30 tablet 0    zolpidem (AMBIEN) 10 mg Tab Take 10 mg by mouth nightly as needed.      zolpidem (AMBIEN) 10 mg Tab Take 1 tablet by mouth at bedtime (Patient not taking: Reported on 11/27/2019) 30 tablet 0     No current facility-administered medications on file prior to visit.        Review of Systems   Constitution: Positive for malaise/fatigue.   HENT: Negative for hearing loss and hoarse voice.    Eyes: Negative for blurred vision and visual disturbance.   Cardiovascular: Negative for chest pain, claudication, dyspnea on exertion, irregular heartbeat, leg swelling, near-syncope, orthopnea, palpitations, paroxysmal nocturnal dyspnea and syncope.   Respiratory: Negative for cough, hemoptysis, shortness of " breath, sleep disturbances due to breathing, snoring and wheezing.    Endocrine: Negative for cold intolerance and heat intolerance.   Hematologic/Lymphatic: Does not bruise/bleed easily.   Skin: Negative for color change, dry skin and nail changes.   Musculoskeletal: Positive for arthritis and back pain. Negative for joint pain and myalgias.   Gastrointestinal: Negative for bloating, abdominal pain, constipation, nausea and vomiting.   Genitourinary: Negative for dysuria, flank pain, hematuria and hesitancy.   Neurological: Positive for dizziness and weakness. Negative for headaches, light-headedness, loss of balance, numbness and paresthesias.   Psychiatric/Behavioral: Negative for altered mental status.   Allergic/Immunologic: Negative for environmental allergies.     /80 (BP Location: Right arm, Patient Position: Sitting)   Pulse 88   Wt 92.4 kg (203 lb 11.3 oz)   SpO2 95%   BMI 45.67 kg/m²     Objective:   Physical Exam   Constitutional: She is oriented to person, place, and time. She appears well-developed and well-nourished. No distress.   HENT:   Head: Normocephalic and atraumatic.   Eyes: Pupils are equal, round, and reactive to light.   Neck: Normal range of motion and full passive range of motion without pain. Neck supple. No JVD present.   Cardiovascular: Normal rate, regular rhythm, S1 normal, S2 normal and intact distal pulses.  Occasional extrasystoles are present. PMI is not displaced. Exam reveals no distant heart sounds.   No murmur heard.  Pulses:       Radial pulses are 2+ on the right side, and 2+ on the left side.        Dorsalis pedis pulses are 2+ on the right side, and 2+ on the left side.   CHEST PAIN FREE  BP WELL CONTROLLED TODAY   Pulmonary/Chest: Effort normal and breath sounds normal. No accessory muscle usage. No respiratory distress. She has no decreased breath sounds. She has no wheezes. She has no rales.   +NOCTURNAL OXYGEN    Abdominal: Soft. Bowel sounds are normal. She  exhibits no distension. There is no tenderness.   +obese abdomen   Musculoskeletal: Normal range of motion. She exhibits no edema.        Right ankle: She exhibits no swelling.        Left ankle: She exhibits no swelling.   Neurological: She is alert and oriented to person, place, and time.   Skin: Skin is warm and dry. She is not diaphoretic. No cyanosis. Nails show no clubbing.   Psychiatric: She has a normal mood and affect. Her speech is normal and behavior is normal. Judgment and thought content normal. Cognition and memory are normal.   Nursing note and vitals reviewed.      Lab Results   Component Value Date    CHOL 142 04/25/2019    CHOL 102 (L) 12/22/2017    CHOL 128 03/22/2016     Lab Results   Component Value Date    HDL 36 (L) 04/25/2019    HDL 43 12/22/2017    HDL 46 03/22/2016     Lab Results   Component Value Date    LDLCALC 40.4 (L) 04/25/2019    LDLCALC 33.6 (L) 12/22/2017    LDLCALC 47.4 (L) 03/22/2016     Lab Results   Component Value Date    TRIG 328 (H) 04/25/2019    TRIG 127 12/22/2017    TRIG 173 (H) 03/22/2016     Lab Results   Component Value Date    CHOLHDL 25.4 04/25/2019    CHOLHDL 42.2 12/22/2017    CHOLHDL 35.9 03/22/2016       Chemistry        Component Value Date/Time     11/18/2019 1044    K 5.3 (H) 11/18/2019 1044    CL 99 11/18/2019 1044    CO2 28 11/18/2019 1044    BUN 10 11/18/2019 1044    CREATININE 0.7 11/18/2019 1044     (H) 11/18/2019 1044        Component Value Date/Time    CALCIUM 10.3 11/18/2019 1044    ALKPHOS 110 10/07/2019 1510    AST 54 (H) 10/07/2019 1510    ALT 51 (H) 10/07/2019 1510    BILITOT 0.3 10/07/2019 1510    ESTGFRAFRICA >60 11/18/2019 1044    EGFRNONAA >60 11/18/2019 1044          Lab Results   Component Value Date    TSH 1.855 12/22/2017     Lab Results   Component Value Date    INR 0.9 11/23/2016     Lab Results   Component Value Date    WBC 8.21 10/07/2019    HGB 12.5 10/07/2019    HCT 42.0 10/07/2019    MCV 90 10/07/2019      10/07/2019      Assessment:      1. Essential hypertension    2. Hypertriglyceridemia    3. Dyslipidemia associated with type 2 diabetes mellitus    4. Abnormal ECG    5. Hyperaldosteronism    6. Hyperkalemia      Patient presents to clinic for BP check and is doing ok.  BP well controlled today in office  She reports episodes of dizzy spells and headaches with BP going up and down lately.   Plan:     Resume Aldactone 25 mg daily  Add lasix 20 mg daily as well  Continue all other CV meds for now  Will have her reach out to Endocrine due to elevated BG's in 300s recently  Continue Digital HTN program  Dash diet, 2 gm sodium restriction  1500 ml fluid restriction  Encourage regular physical activity  Encourage weight loss  BMP, Mag today with phone review  Phone review next week for symptom check and determine F/U    Nicole May, FNP-C [facility]      Addendum:  Given normal K+ on labs today and low Mag, ok to start Lasix and Aldactone combination. She should start taking Magnesium 400 mg daily, Rx sent.   Follow up in 1 month with repeat BMP and Mag.

## 2019-11-26 NOTE — TELEPHONE ENCOUNTER
----- Message from Rashmi Albrecht sent at 11/26/2019  8:36 AM CST -----  Contact: pt  Type:  Needs Medical Advice    Who Called: Patient  Symptoms (please be specific): high blood pressure [phone]  How long has patient had these symptoms: 3days  Pharmacy name and phone #:  [facility]  Would the patient rather a call back or a response via [facility]? Call back  Best Call Back Number: [phone]  Additional Information: na

## 2019-11-26 NOTE — TELEPHONE ENCOUNTER
Patient states that she is having migraines, nausea, lightheadedness. Her BP this morning was 150/100. The patient is not having chest pains. I have scheduled her to see Buffy Root on Wed at 9 am at [facility]. I advised the patient to go to the ER if symptoms worsen

## 2019-11-27 ENCOUNTER — LAB VISIT (OUTPATIENT)
Dept: LAB | Facility: HOSPITAL | Age: 49
End: 2019-11-27
Attending: NURSE PRACTITIONER
Payer: MEDICAID

## 2019-11-27 ENCOUNTER — NURSE TRIAGE (OUTPATIENT)
Dept: ADMINISTRATIVE | Facility: CLINIC | Age: 49
End: 2019-11-27

## 2019-11-27 ENCOUNTER — OFFICE VISIT (OUTPATIENT)
Dept: CARDIOLOGY | Facility: CLINIC | Age: 49
End: 2019-11-27
Payer: MEDICAID

## 2019-11-27 ENCOUNTER — TELEPHONE (OUTPATIENT)
Dept: CARDIOLOGY | Facility: CLINIC | Age: 49
End: 2019-11-27

## 2019-11-27 VITALS
HEART RATE: 88 BPM | OXYGEN SATURATION: 95 % | WEIGHT: 203.69 LBS | BODY MASS INDEX: 45.67 KG/M2 | DIASTOLIC BLOOD PRESSURE: 80 MMHG | SYSTOLIC BLOOD PRESSURE: 128 MMHG

## 2019-11-27 DIAGNOSIS — R94.31 ABNORMAL ECG: ICD-10-CM

## 2019-11-27 DIAGNOSIS — E26.9 HYPERALDOSTERONISM: ICD-10-CM

## 2019-11-27 DIAGNOSIS — E11.69 DYSLIPIDEMIA ASSOCIATED WITH TYPE 2 DIABETES MELLITUS: Chronic | ICD-10-CM

## 2019-11-27 DIAGNOSIS — E78.5 DYSLIPIDEMIA ASSOCIATED WITH TYPE 2 DIABETES MELLITUS: Chronic | ICD-10-CM

## 2019-11-27 DIAGNOSIS — E87.5 HYPERKALEMIA: ICD-10-CM

## 2019-11-27 DIAGNOSIS — E83.42 HYPOMAGNESEMIA: ICD-10-CM

## 2019-11-27 DIAGNOSIS — I10 ESSENTIAL HYPERTENSION: Primary | Chronic | ICD-10-CM

## 2019-11-27 DIAGNOSIS — E78.1 HYPERTRIGLYCERIDEMIA: Chronic | ICD-10-CM

## 2019-11-27 DIAGNOSIS — I10 ESSENTIAL HYPERTENSION: Chronic | ICD-10-CM

## 2019-11-27 LAB
ANION GAP SERPL CALC-SCNC: 11 MMOL/L (ref 8–16)
BUN SERPL-MCNC: 10 MG/DL (ref 6–20)
CALCIUM SERPL-MCNC: 9.7 MG/DL (ref 8.7–10.5)
CHLORIDE SERPL-SCNC: 99 MMOL/L (ref 95–110)
CO2 SERPL-SCNC: 29 MMOL/L (ref 23–29)
CREAT SERPL-MCNC: 0.7 MG/DL (ref 0.5–1.4)
EST. GFR  (AFRICAN AMERICAN): >60 ML/MIN/1.73 M^2
EST. GFR  (NON AFRICAN AMERICAN): >60 ML/MIN/1.73 M^2
GLUCOSE SERPL-MCNC: 123 MG/DL (ref 70–110)
MAGNESIUM SERPL-MCNC: 1.6 MG/DL (ref 1.6–2.6)
POTASSIUM SERPL-SCNC: 3.8 MMOL/L (ref 3.5–5.1)
SODIUM SERPL-SCNC: 139 MMOL/L (ref 136–145)

## 2019-11-27 PROCEDURE — 83735 ASSAY OF MAGNESIUM: CPT

## 2019-11-27 PROCEDURE — 99999 PR PBB SHADOW E&M-EST. PATIENT-LVL V: CPT | Mod: PBBFAC,,, | Performed by: NURSE PRACTITIONER

## 2019-11-27 PROCEDURE — 36415 COLL VENOUS BLD VENIPUNCTURE: CPT

## 2019-11-27 PROCEDURE — 99214 OFFICE O/P EST MOD 30 MIN: CPT | Mod: S$PBB,,, | Performed by: NURSE PRACTITIONER

## 2019-11-27 PROCEDURE — 99215 OFFICE O/P EST HI 40 MIN: CPT | Mod: PBBFAC | Performed by: NURSE PRACTITIONER

## 2019-11-27 PROCEDURE — 99214 PR OFFICE/OUTPT VISIT, EST, LEVL IV, 30-39 MIN: ICD-10-PCS | Mod: S$PBB,,, | Performed by: NURSE PRACTITIONER

## 2019-11-27 PROCEDURE — 80048 BASIC METABOLIC PNL TOTAL CA: CPT

## 2019-11-27 PROCEDURE — 99999 PR PBB SHADOW E&M-EST. PATIENT-LVL V: ICD-10-PCS | Mod: PBBFAC,,, | Performed by: NURSE PRACTITIONER

## 2019-11-27 RX ORDER — LANOLIN ALCOHOL/MO/W.PET/CERES
400 CREAM (GRAM) TOPICAL DAILY
Qty: 30 TABLET | Refills: 3 | Status: SHIPPED | OUTPATIENT
Start: 2019-11-27 | End: 2020-05-20 | Stop reason: SDUPTHER

## 2019-11-27 RX ORDER — FUROSEMIDE 20 MG/1
20 TABLET ORAL DAILY
Qty: 30 TABLET | Refills: 11 | Status: SHIPPED | OUTPATIENT
Start: 2019-11-27 | End: 2020-12-08 | Stop reason: SDUPTHER

## 2019-11-27 NOTE — TELEPHONE ENCOUNTER
Reason for Disposition   Blood glucose  mg/dl (3.5 -13 mmol/l)    Additional Information   Negative: Unconscious or difficult to awaken   Negative: Acting confused (e.g., disoriented, slurred speech)   Negative: Very weak (can't stand)   Negative: Sounds like a life-threatening emergency to the triager   Negative: Vomiting and signs of dehydration (e.g., very dry mouth, lightheaded, etc.)   Negative: Blood glucose > 240 mg/dl (13 mmol/l) and rapid breathing   Negative: Blood glucose > 500 mg/dl (27.5 mmol/l)   Negative: Blood glucose > 240 mg/dl (13 mmol/l) AND urine ketones moderate-large (or more than 1+)   Negative: Blood glucose > 240 mg/dl (13 mmol/l) and blood ketones > 1.5 mmol/l   Negative: Blood glucose > 240 mg/dl (13 mmol/l) AND vomiting AND unable to check for ketones (in blood or urine)   Negative: Vomiting lasting > 4 hours   Negative: Patient sounds very sick or weak to the triager   Negative: Fever > 100.5 F (38.1 C)   Negative: Caller has URGENT medication or insulin pump question and triager unable to answer question   Negative: Blood glucose > 400 mg/dl (22 mmol/l)   Negative: Blood glucose > 300 mg/dl (16.7 mmol/l) AND two or more times in a row   Negative: Urine ketones moderate - large (or blood ketones > 1.5 mmol/l)   Negative: New-onset diabetes suspected (e.g., frequent urination, weak, weight loss)   Negative: Symptoms of high blood sugar (e.g., frequent urination, weak, weight loss) and not able to test blood glucose   Negative: Patient wants to be seen   Negative: Caller has NON-URGENT medication question about med that PCP prescribed and triager unable to answer question   Negative: Blood glucose > 240 mg/dl (13 mmol/l)    Protocols used: DIABETES - HIGH BLOOD SUGAR-A-OH    Pt called stated her blood sugar was 387 last night. And 300 something the night before. This am it  was 115. Pt is not certain if evening blood sugar was checked at least 2 hours  after eating or drinking Pt educated on home care advice. Pt verbalized understanding.

## 2019-11-27 NOTE — TELEPHONE ENCOUNTER
----- Message from KELI Chávez sent at 11/27/2019  9:48 AM CST -----  Please call patient    Her labs revealed potassium in normal range, excellent.   Her magnesium is on low side, I will send prescription for her to take daily.   She should start aldactone and lasix combination starting today    Thanks  Buffy

## 2019-12-03 ENCOUNTER — OFFICE VISIT (OUTPATIENT)
Dept: URGENT CARE | Facility: CLINIC | Age: 49
End: 2019-12-03
Payer: MEDICAID

## 2019-12-03 VITALS
TEMPERATURE: 99 F | RESPIRATION RATE: 18 BRPM | HEIGHT: 56 IN | DIASTOLIC BLOOD PRESSURE: 80 MMHG | BODY MASS INDEX: 46.15 KG/M2 | WEIGHT: 205.13 LBS | SYSTOLIC BLOOD PRESSURE: 136 MMHG

## 2019-12-03 DIAGNOSIS — R42 VERTIGO: ICD-10-CM

## 2019-12-03 DIAGNOSIS — R05.9 COUGH: Primary | ICD-10-CM

## 2019-12-03 PROCEDURE — 99999 PR PBB SHADOW E&M-EST. PATIENT-LVL III: ICD-10-PCS | Mod: PBBFAC,,, | Performed by: PHYSICIAN ASSISTANT

## 2019-12-03 PROCEDURE — 99214 OFFICE O/P EST MOD 30 MIN: CPT | Mod: S$PBB,,, | Performed by: PHYSICIAN ASSISTANT

## 2019-12-03 PROCEDURE — 99213 OFFICE O/P EST LOW 20 MIN: CPT | Mod: PBBFAC,PO | Performed by: PHYSICIAN ASSISTANT

## 2019-12-03 PROCEDURE — 99999 PR PBB SHADOW E&M-EST. PATIENT-LVL III: CPT | Mod: PBBFAC,,, | Performed by: PHYSICIAN ASSISTANT

## 2019-12-03 PROCEDURE — 99214 PR OFFICE/OUTPT VISIT, EST, LEVL IV, 30-39 MIN: ICD-10-PCS | Mod: S$PBB,,, | Performed by: PHYSICIAN ASSISTANT

## 2019-12-03 RX ORDER — MECLIZINE HYDROCHLORIDE 25 MG/1
25 TABLET ORAL 3 TIMES DAILY PRN
Qty: 21 TABLET | Refills: 0 | Status: SHIPPED | OUTPATIENT
Start: 2019-12-03 | End: 2020-02-04

## 2019-12-03 RX ORDER — MECLIZINE HYDROCHLORIDE 25 MG/1
25 TABLET ORAL
Status: DISCONTINUED | OUTPATIENT
Start: 2019-12-03 | End: 2019-12-03

## 2019-12-03 NOTE — PATIENT INSTRUCTIONS
Meclizine sent to pharmacy.  Rest.  No driving while dizzy symptoms still present.  Follow up with primary care physician in 3-4 days if no improvement.  Possible ENT if no improvement.  ER with new or worsening symptoms.      Vertigo (Unknown Cause)    In addition to helping with hearing, the inner ear is part of the balance center of your body. Problems with the inner ear can a false feeling of motion. This is called vertigo. Often, it feels as if you or the room is spinning. A vertigo attack may cause sudden nausea, vomiting and heavy sweating. Severe vertigo causes a loss of balance and can cause you to fall. During vertigo, small head movements and changes in body position will often make the symptoms worse. You may also have ringing in the ears called tinnitus.  An episode of vertigo may last seconds, minutes or hours. Once you are over the first episode, it may never come back. However, symptoms may return off and on.  The cause of your vertigo is not yet known. Possible causes of vertigo include:  · Inflammation of the inner ear  · Disease of the nerves to the inner ear  · Movement of calcium particles in the inner ear  · Poor blood flow to the balance centers of the brain  · Migraine headaches  Home care  · If symptoms are severe, rest quietly in bed. Change positions very slowly. There is usually one position that will feel best, such as lying on one side or lying on your back with your head slightly raised on pillows.  · Do not drive a car or work with dangerous machinery until symptoms have been gone for at least one week.  · Take medicine as prescribed to relieve your symptoms. Unless another medicine was prescribed for symptoms of nausea, vomiting, and dizziness, you may use over-the-counter motion sickness pills. Ask your pharmacist for suggestions.  Follow-up care  Follow up with your healthcare provider or as directed. If you are referred to a specialist or for testing, make the appointment  promptly.  When to seek medical advice  Call your healthcare provider if any of the following occur:  · Fever of 100.4°F (38ºC) or higher, or as directed by your healthcare provider  · Vertigo worsens or is not controlled by prescribed medicine   · Repeated vomiting not relieved by prescribed medicine   · Severe headache  · Confusion  · Weakness of an arm or leg or one side of the face  · Difficulty with speech or vision  · Loss of consciousness   · Seizure  Date Last Reviewed: 8/16/2015 © 2000-2017 [employer]. [address] [place] [id number]. All rights reserved. This information is not intended as a substitute for professional medical care. Always follow your healthcare professional's instructions.

## 2019-12-03 NOTE — PROGRESS NOTES
"Yolanda Betts is a 49 year old female who presents today with complaints of nasal congestion and productive cough that has been present for a few days.  She also noted symptoms of dizziness and nausea that started last night and have progressively worsened.  She states dizziness is elicited by movement and standing.  No change in her gait or vision changes.  No history of vertigo.    All areas of patients chart reviewed including past medical history, past surgical history, medications, allergies, family history, and social history.    Review of Systems   Constitutional: Negative for fever.   HENT: Positive for congestion. Negative for sore throat.    Respiratory: Negative for shortness of breath.    Cardiovascular: Negative for chest pain.   Gastrointestinal: Positive for nausea. Negative for abdominal pain.   Genitourinary: Negative for dysuria and frequency.   Musculoskeletal: Negative for back pain.   Neurological: Positive for dizziness. Negative for headaches.   All other systems reviewed and are negative.    Objective:  /80 (BP Location: Right arm, Patient Position: Sitting, BP Method: Large (Manual))   Temp 99.2 °F (37.3 °C) (Tympanic)   Resp 18   Ht 4' 8" (1.422 m)   Wt 93.1 kg (205 lb 2.2 oz)   BMI 45.99 kg/m²   Physical Exam   Constitutional: She is oriented to person, place, and time and well-developed, well-nourished, and in no distress.   HENT:   Head: Normocephalic.   Right Ear: Tympanic membrane and external ear normal.   Left Ear: Tympanic membrane and external ear normal.   Nose: Mucosal edema present.   Mouth/Throat: Uvula is midline, oropharynx is clear and moist and mucous membranes are normal. No oropharyngeal exudate.   Patient noted increase in dizziness when evaluated EOMs and movement of head from side to side.   Eyes: Pupils are equal, round, and reactive to light. Conjunctivae, EOM and lids are normal. Right eye exhibits no nystagmus. Left eye exhibits no nystagmus. "   Neck: Normal range of motion.   Cardiovascular: Normal rate and normal heart sounds.   Pulmonary/Chest: Effort normal and breath sounds normal. No respiratory distress.   Neurological: She is alert and oriented to person, place, and time.   Skin: Skin is warm.   Psychiatric: Affect normal.     Assessment:  Encounter Diagnoses   Name Primary?    Cough Yes    Vertigo      Plan:  Patient Instructions   Meclizine sent to pharmacy.  Rest.  No driving while dizzy symptoms still present.  Follow up with primary care physician in 3-4 days if no improvement.  Possible ENT if no improvement.  ER with new or worsening symptoms.      Vertigo (Unknown Cause)    In addition to helping with hearing, the inner ear is part of the balance center of your body. Problems with the inner ear can a false feeling of motion. This is called vertigo. Often, it feels as if you or the room is spinning. A vertigo attack may cause sudden nausea, vomiting and heavy sweating. Severe vertigo causes a loss of balance and can cause you to fall. During vertigo, small head movements and changes in body position will often make the symptoms worse. You may also have ringing in the ears called tinnitus.  An episode of vertigo may last seconds, minutes or hours. Once you are over the first episode, it may never come back. However, symptoms may return off and on.  The cause of your vertigo is not yet known. Possible causes of vertigo include:  · Inflammation of the inner ear  · Disease of the nerves to the inner ear  · Movement of calcium particles in the inner ear  · Poor blood flow to the balance centers of the brain  · Migraine headaches  Home care  · If symptoms are severe, rest quietly in bed. Change positions very slowly. There is usually one position that will feel best, such as lying on one side or lying on your back with your head slightly raised on pillows.  · Do not drive a car or work with dangerous machinery until symptoms have been gone for  at least one week.  · Take medicine as prescribed to relieve your symptoms. Unless another medicine was prescribed for symptoms of nausea, vomiting, and dizziness, you may use over-the-counter motion sickness pills. Ask your pharmacist for suggestions.  Follow-up care  Follow up with your healthcare provider or as directed. If you are referred to a specialist or for testing, make the appointment promptly.  When to seek medical advice  Call your healthcare provider if any of the following occur:  · Fever of 100.4°F (38ºC) or higher, or as directed by your healthcare provider  · Vertigo worsens or is not controlled by prescribed medicine   · Repeated vomiting not relieved by prescribed medicine   · Severe headache  · Confusion  · Weakness of an arm or leg or one side of the face  · Difficulty with speech or vision  · Loss of consciousness   · Seizure  Date Last Reviewed: 8/16/2015  © 1783-0993 [employer]. [address] [place] [address]. All rights reserved. This information is not intended as a substitute for professional medical care. Always follow your healthcare professional's instructions.

## 2019-12-04 ENCOUNTER — TELEPHONE (OUTPATIENT)
Dept: PHARMACY | Facility: CLINIC | Age: 49
End: 2019-12-04

## 2019-12-04 ENCOUNTER — TELEPHONE (OUTPATIENT)
Dept: INTERNAL MEDICINE | Facility: CLINIC | Age: 49
End: 2019-12-04

## 2019-12-04 ENCOUNTER — PATIENT OUTREACH (OUTPATIENT)
Dept: OTHER | Facility: OTHER | Age: 49
End: 2019-12-04

## 2019-12-04 NOTE — PROGRESS NOTES
Digital Medicine: Health  Follow-Up    Patient reports that she has had a headache for about a week now. Patient went ot urgent care yesterday and was diagnosed with a cough. Patient is also having dizzying spells (vertigo). Patient is waiting for phone call from doctor on how to proceed. She attributes these issues to higher BP readings from recent. Will f/u in 4-6 weeks    The history is provided by the patient.     Follow Up  Follow-up reason(s): reading review      Readings are trending up       INTERVENTION(S)  encouragement/support      There are no preventive care reminders to display for this patient.    Last 5 Patient Entered Readings                                      Current 30 Day Average: 145/93     Recent Readings 12/3/2019 12/3/2019 12/2/2019 12/1/2019 11/29/2019    SBP (mmHg) 144 171 153 144 154    DBP (mmHg) 94 109 98 90 97    Pulse 91 94 94 87 89                      Diet Screening   She has the following dietary restrictions: diabetic    Barriers to a Healthy Diet: no barriers to healthy eating    Patient states that starting on January 1st she plans to cut out all red meat and stick to only seafood.     Physical Activity Screening   When asked if exercising, patient responded: no    Patient is dealing with vertigo at the moment but has not been active since our last encounter    Medication Adherence Screening   She misses doses: never      Patient identified the following reasons for non-compliance: none    Patient reports no s/s or issues taking BP medications as prescribed.       SDOH

## 2019-12-04 NOTE — TELEPHONE ENCOUNTER
----- Message from Antonio Matta sent at 12/4/2019  9:41 AM CST -----  Contact: Self- [phone]  .Type:  Same Day Appointment Request    Caller is requesting a same day appointment.  Caller declined first available appointment listed below.    Name of Caller:Yolanda Betts  When is the first available appointment?Unsure   Symptoms:Congestion, nausea,  Possible (PINK DOT )  Best Call Back Number:.[phone] (home)   Additional Information:     Thank You,   Antonio Matta

## 2019-12-04 NOTE — TELEPHONE ENCOUNTER
Pt stated she would like to be seen today/soon for symptoms reported in last note. Pt stated she went to urgent care yesterday and she is not going back. I informed pt we did not have any appts today. Next available was next year.

## 2019-12-05 ENCOUNTER — PATIENT MESSAGE (OUTPATIENT)
Dept: ENDOCRINOLOGY | Facility: CLINIC | Age: 49
End: 2019-12-05

## 2019-12-05 ENCOUNTER — TELEPHONE (OUTPATIENT)
Dept: CARDIOLOGY | Facility: CLINIC | Age: 49
End: 2019-12-05

## 2019-12-05 ENCOUNTER — TELEPHONE (OUTPATIENT)
Dept: PHARMACY | Facility: CLINIC | Age: 49
End: 2019-12-05

## 2019-12-05 ENCOUNTER — PATIENT MESSAGE (OUTPATIENT)
Dept: INTERNAL MEDICINE | Facility: CLINIC | Age: 49
End: 2019-12-05

## 2019-12-05 NOTE — TELEPHONE ENCOUNTER
"I spoke with pt. She is having some swelling in her hands. Some COATS but not all the time. She says she has a cold right now so has been coughing, productive with some greenish phlegm. Advised she keep and eye on this. Denies any fever, and PCP if worsens or lingers longer than a week.   She says she does have PND and orthopnea, "All the time".   Taking lasix 20mg once in am daily. I let her know I would discuss with you and call back with any further recommendations or orders,. .  "

## 2019-12-05 NOTE — TELEPHONE ENCOUNTER
The patient has been notified of this information and all questions answered.  Pt is taking lasix and aldactone with increase in UOP after taking.   Orders given to pt in detail. She repeated back correctly. Phone review scheduled to f/u with her next week to check on symptoms.

## 2019-12-05 NOTE — TELEPHONE ENCOUNTER
The prior authorization for Yolanda Betts's Toujeo has been submitted on 12/4/19 @ 6pm.      It can take up to 72 hours for a decision to be rendered from the insurance.      Patient is aware of PA and process.  Please let me know if you have any questions.    Thank You!   Consuelo Wolfe CPhT, B.A  Patient Care Advocate   [facility]  [email or web address]  Phone: [phone] Ext [phone]  Fax: [phone]

## 2019-12-05 NOTE — TELEPHONE ENCOUNTER
Good Morning,     The prior authorization for Yolanda Betts's Toujeo Inusulin prescription has been APPROVED FROM 12/4/19 TO 12/3/2020 with copayment of $0.00.       Patient has been notified of the decision on 12/5/19 and patient states he/she will  medication.    If there are any additional questions or concerns, please contact me.    [facility]  [phone]    Thank You!   Consuelo Wolfe CPhT, B.A  Patient Care Advocate   [facility]  [email or web address]  Phone: [phone] Ext [phone]  Fax: [phone]

## 2019-12-05 NOTE — TELEPHONE ENCOUNTER
It appears that her endocrinologist, Dr. Severino, has ordered her to start Trujeo, and her nurse is working on insurance approval. She has follow-up appointments already scheduled (below). No further action required at this point.    Future Appointments   Date Time Provider Department Center   12/16/2019  2:00 PM Damir Driscoll MD [facility] GENSUR [facility]   12/18/2019 10:30 AM Yara Sanchez RD, CDE [facility] DIBEDU [facility]   1/6/2020  9:45 AM LABORATORY, [facility] LAB [facility]   1/6/2020 10:30 AM Buffy Root, NOLANP-C [facility] CARDIO [facility]   1/10/2020 10:00 AM Abilio Gibbs MD [facility] RHEUM [facility]   1/13/2020  2:00 PM Malka Severino MD [facility] ENDO [facility]   1/16/2020  2:00 PM DOMINIK Beach MD [facility] IM [facility]   1/17/2020  2:15 PM [facility] XR1 [facility] XRAY [facility]   1/17/2020  2:30 PM PULMONARY LAB 2, [facility] PULMFUN [facility]   1/17/2020  3:00 PM Sylvain Rainey MD [facility] PULMSVC [facility]

## 2019-12-05 NOTE — TELEPHONE ENCOUNTER
----- Message from KARRIE Chávez-JHON sent at 12/5/2019 10:39 AM CST -----  Can we call patient for symptom check?    Thanks

## 2019-12-06 ENCOUNTER — TELEPHONE (OUTPATIENT)
Dept: INTERNAL MEDICINE | Facility: CLINIC | Age: 49
End: 2019-12-06

## 2019-12-06 NOTE — TELEPHONE ENCOUNTER
Spoke with patient and informed her that Dr. Beach agreed to work her in for an appointment at 8:20 AM Monday 12/9/19. She verbalized understanding.

## 2019-12-06 NOTE — TELEPHONE ENCOUNTER
----- Message from Jana Mejia sent at 12/6/2019  7:42 AM CST -----  Contact: pt  Type:  Same Day Appointment Request    Caller is requesting a same day appointment.  Caller declined first available appointment listed below.    Name of Caller:pt  When is the first available appointment?nothing coming up on the schedule  Symptoms:cough/congestion/sore thro  Best Call Back Number:[phone]  Additional Information: .    Thank you

## 2019-12-06 NOTE — TELEPHONE ENCOUNTER
Spoke with patient and she stated that she went to urgent care on Monday or Tuesday for sinus issues. She said that they just told her to take Mucinex, but it is not helping her. She wants an appointment or another medication sent in by Dr. Beach. Her symptoms are greenish mucous, cough and sore throat. I informed her that there are no available appointments. She said she will not go back to an urgent care.

## 2019-12-09 ENCOUNTER — PATIENT MESSAGE (OUTPATIENT)
Dept: INTERNAL MEDICINE | Facility: CLINIC | Age: 49
End: 2019-12-09

## 2019-12-09 ENCOUNTER — OFFICE VISIT (OUTPATIENT)
Dept: INTERNAL MEDICINE | Facility: CLINIC | Age: 49
End: 2019-12-09
Payer: MEDICAID

## 2019-12-09 VITALS
WEIGHT: 201.5 LBS | DIASTOLIC BLOOD PRESSURE: 82 MMHG | OXYGEN SATURATION: 97 % | SYSTOLIC BLOOD PRESSURE: 126 MMHG | HEIGHT: 56 IN | BODY MASS INDEX: 45.33 KG/M2 | HEART RATE: 96 BPM | TEMPERATURE: 97 F

## 2019-12-09 DIAGNOSIS — G43.109 MIGRAINE WITH AURA AND WITHOUT STATUS MIGRAINOSUS, NOT INTRACTABLE: Chronic | ICD-10-CM

## 2019-12-09 DIAGNOSIS — G47.36 NOCTURNAL HYPOXEMIA DUE TO OBESITY: Chronic | ICD-10-CM

## 2019-12-09 DIAGNOSIS — Z79.4 TYPE 2 DIABETES MELLITUS WITH HYPERGLYCEMIA, WITH LONG-TERM CURRENT USE OF INSULIN: Chronic | ICD-10-CM

## 2019-12-09 DIAGNOSIS — J01.00 ACUTE NON-RECURRENT MAXILLARY SINUSITIS: Primary | ICD-10-CM

## 2019-12-09 DIAGNOSIS — E11.65 TYPE 2 DIABETES MELLITUS WITH HYPERGLYCEMIA, WITH LONG-TERM CURRENT USE OF INSULIN: Chronic | ICD-10-CM

## 2019-12-09 DIAGNOSIS — F25.0 SCHIZOAFFECTIVE DISORDER, BIPOLAR TYPE: Chronic | ICD-10-CM

## 2019-12-09 DIAGNOSIS — E66.9 NOCTURNAL HYPOXEMIA DUE TO OBESITY: Chronic | ICD-10-CM

## 2019-12-09 PROCEDURE — 99999 PR PBB SHADOW E&M-EST. PATIENT-LVL V: ICD-10-PCS | Mod: PBBFAC,,, | Performed by: FAMILY MEDICINE

## 2019-12-09 PROCEDURE — 99999 PR PBB SHADOW E&M-EST. PATIENT-LVL V: CPT | Mod: PBBFAC,,, | Performed by: FAMILY MEDICINE

## 2019-12-09 PROCEDURE — 99215 OFFICE O/P EST HI 40 MIN: CPT | Mod: PBBFAC | Performed by: FAMILY MEDICINE

## 2019-12-09 PROCEDURE — 99214 PR OFFICE/OUTPT VISIT, EST, LEVL IV, 30-39 MIN: ICD-10-PCS | Mod: S$PBB,,, | Performed by: FAMILY MEDICINE

## 2019-12-09 PROCEDURE — 99214 OFFICE O/P EST MOD 30 MIN: CPT | Mod: S$PBB,,, | Performed by: FAMILY MEDICINE

## 2019-12-09 RX ORDER — AMOXICILLIN 500 MG/1
1000 CAPSULE ORAL EVERY 12 HOURS
Qty: 28 CAPSULE | Refills: 0 | Status: SHIPPED | OUTPATIENT
Start: 2019-12-09 | End: 2019-12-16

## 2019-12-09 RX ORDER — TOPIRAMATE 50 MG/1
50 TABLET, FILM COATED ORAL 2 TIMES DAILY
Qty: 60 TABLET | Refills: 11 | Status: SHIPPED | OUTPATIENT
Start: 2019-12-23 | End: 2020-11-13 | Stop reason: SDUPTHER

## 2019-12-09 RX ORDER — TOPIRAMATE 25 MG/1
TABLET ORAL
Qty: 21 TABLET | Refills: 0 | Status: SHIPPED | OUTPATIENT
Start: 2019-12-09 | End: 2019-12-23

## 2019-12-09 NOTE — PROGRESS NOTES
CHIEF COMPLAINT  Sinus Problem      HISTORY, ASSESSMENT, and PLAN      1. Acute non-recurrent maxillary sinusitis        ASSESSMENT:  She reports 10-14 day history of sinus pressure/discomfort with thick sinus drainage, postnasal drip, and nonproductive cough, worse at night.  No fever.    2. Type 2 diabetes mellitus with hyperglycemia, with long-term current use of insulin        ASSESSMENT:  Uncontrolled based on most recent hemoglobin A1c.  She is checking her home blood glucose levels, but she did not bring record.  She reports no symptomatic hyperglycemia and no hypoglycemia.  She agreed to begin recording blood glucose levels and bring to follow-up appointments so that we can titrate her treatment.    3. Migraine with aura and without status migrainosus, not intractable        ASSESSMENT:  She reports that previously she was treated with Topamax for prevention of her migraines with auras.  She was not having migraines, so she stopped the Topamax a year so ago, and she says she has done well, but her migraines have recurred in typical character over the last several weeks.  She is requesting to re-initiate the Topamax.  She has Frova prescription awaiting at the pharmacy, but she has not yet filled.    4. Nocturnal hypoxemia due to obesity - WITHOUT ALEN        ASSESSMENT:  She reports compliance with nocturnal oxygen use.    5. Schizoaffective disorder, bipolar type        ASSESSMENT:  Appears stable.  She continue to follow with Psychiatry.         Orders Placed This Encounter    Ambulatory Referral to Diabetes Education    amoxicillin (AMOXIL) 500 MG capsule    topiramate (TOPAMAX) 25 MG tablet    topiramate (TOPAMAX) 50 MG tablet        Medication List with Changes/Refills   New Medications    AMOXICILLIN (AMOXIL) 500 MG CAPSULE    Take 2 capsules (1,000 mg total) by mouth every 12 (twelve) hours. for 7 days       Start Date: 12/9/2019 End Date: 12/16/2019    TOPIRAMATE (TOPAMAX) 25 MG TABLET    Take 1  "tablet (25 mg total) by mouth every evening for 7 days, THEN 2 tablets (50 mg total) every evening for 7 days.       Start Date: 12/9/2019 End Date: 12/23/2019    TOPIRAMATE (TOPAMAX) 50 MG TABLET    Take 1 tablet (50 mg total) by mouth 2 (two) times daily.       Start Date: 12/23/2019End Date: 12/22/2020   Current Medications    ALPRAZOLAM (XANAX) 2 MG TAB    Take 1 tablet by mouth twice daily       Start Date: 11/26/2019End Date: --    AMLODIPINE (NORVASC) 10 MG TABLET    Take 10 mg by mouth once daily.       Start Date: --        End Date: --    ARIPIPRAZOLE (ABILIFY) 10 MG TAB    Take 1 tablet by mouth every day       Start Date: 11/27/2019End Date: --    ASPIRIN (ECOTRIN) 81 MG EC TABLET    Take 81 mg by mouth once daily.       Start Date: --        End Date: --    ATORVASTATIN (LIPITOR) 20 MG TABLET    Take 1 tablet (20 mg total) by mouth every evening.       Start Date: 5/2/2019  End Date: 5/1/2020    BD INSULIN SYRINGE ULTRA-FINE 1/2 ML 30 GAUGE X 1/2" SYRG           Start Date: 10/3/2016 End Date: --    BENZTROPINE (COGENTIN) 1 MG TABLET    Take 1 tablet by mouth at bedtime       Start Date: 11/26/2019End Date: --    BLOOD SUGAR DIAGNOSTIC STRP    Check blood glucose 4 times daily as directed and as needed (dispense insurance preferred brand or patient choice)       Start Date: 11/13/2019End Date: --    BLOOD-GLUCOSE METER MISC    Use to check blood sugars, give meter based on insurance specification       Start Date: 11/13/2019End Date: --    CLONIDINE (CATAPRES) 0.1 MG TABLET    Take 1 tablet (0.1 mg total) by mouth every 6 (six) hours as needed (for SBP>150 or DBP>100 mmHg).       Start Date: 10/2/2019 End Date: 10/1/2020    CYCLOSPORINE (RESTASIS) 0.05 % OPHTHALMIC EMULSION    Place 0.4 mLs (1 drop total) into both eyes 2 (two) times daily.       Start Date: 5/22/2019 End Date: 5/21/2020    DULOXETINE (CYMBALTA) 60 MG CAPSULE    Take 1 capsule by mouth twice daily       Start Date: 11/26/2019End Date: " --    ERGOCALCIFEROL (ERGOCALCIFEROL) 50,000 UNIT CAP    Take 1 capsule (50,000 Units total) by mouth every 14 (fourteen) days.       Start Date: 7/22/2019 End Date: --    FISH OIL-OMEGA-3 FATTY ACIDS 300-1,000 MG CAPSULE    Take 1 capsule by mouth once daily.       Start Date: --        End Date: --    FLUTICASONE PROPIONATE (FLONASE) 50 MCG/ACTUATION NASAL SPRAY    2 sprays (100 mcg total) by Each Nare route once daily.       Start Date: 5/13/2019 End Date: 5/12/2020    FROVATRIPTAN (FROVA) 2.5 MG TABLET    Take one tablet orally. If recurs, may repeat after 2 hours. Max of 3 tabs in 24 hours.       Start Date: 11/23/2019End Date: --    FUROSEMIDE (LASIX) 20 MG TABLET    Take 1 tablet (20 mg total) by mouth once daily.       Start Date: 11/27/2019End Date: 11/26/2020    GABAPENTIN (NEURONTIN) 300 MG CAPSULE    Take 2 capsules (600 mg total) by mouth 3 (three) times daily.       Start Date: 9/10/2019 End Date: 9/9/2020    HYDRALAZINE (APRESOLINE) 25 MG TABLET    Take 1 tablet (25 mg total) by mouth every 12 (twelve) hours.       Start Date: 11/19/2019End Date: 11/18/2020    INSULIN ASPART U-100 (NOVOLOG FLEXPEN U-100 INSULIN) 100 UNIT/ML (3 ML) INPN PEN    Inject before meals TID AC up to 30 units daily       Start Date: 9/4/2019  End Date: --    LANCETS 28 GAUGE MISC    use as directed 3 times daily       Start Date: 5/2/2019  End Date: --    LINZESS 145 MCG CAP CAPSULE    Take 145 mcg by mouth as needed.        Start Date: 12/20/2016End Date: --    LIRAGLUTIDE 0.6 MG/0.1 ML, 18 MG/3 ML, SUBQ PNIJ (VICTOZA 2-MILENA) 0.6 MG/0.1 ML (18 MG/3 ML) PNIJ    Use 1.8mg under the skin daily       Start Date: 9/30/2019 End Date: --    LURASIDONE (LATUDA) 120 MG TAB    Take 1 tablet by mouth at 5 pm with food       Start Date: 11/26/2019End Date: --    MAGNESIUM OXIDE (MAG-OX) 400 MG (241.3 MG MAGNESIUM) TABLET    Take 1 tablet (400 mg total) by mouth once daily.       Start Date: 11/27/2019End Date: --    MECLIZINE (ANTIVERT)  "25 MG TABLET    Take 1 tablet (25 mg total) by mouth 3 (three) times daily as needed.       Start Date: 12/3/2019 End Date: 12/12/2019    METFORMIN (GLUCOPHAGE) 1000 MG TABLET    TAKE 1 TABLET (1,000 MG TOTAL) BY MOUTH 2 (TWO) TIMES DAILY.       Start Date: 5/2/2019  End Date: --    METOPROLOL SUCCINATE (TOPROL-XL) 100 MG 24 HR TABLET    Take 2 tablets (200 mg total) by mouth every evening.       Start Date: 11/4/2019 End Date: --    MIRTAZAPINE (REMERON) 45 MG TABLET    Take 1 tablet by mouth at bedtime       Start Date: 11/26/2019End Date: --    MOMETASONE (ASMANEX TWISTHALER) 220 MCG (120 DOSES) AEPB    Inhale 2 puffs into the lungs 2 (two) times daily. Controller       Start Date: 7/8/2019  End Date: 7/7/2020    MULTIVIT,CALC,MINS/IRON/FOLIC (DAILY MULTIPLE FOR WOMEN ORAL)    Take 1 tablet by mouth once daily.       Start Date: --        End Date: --    MUPIROCIN (BACTROBAN) 2 % OINTMENT    Apply topically 3 (three) times daily.       Start Date: 10/31/2019End Date: --    PEN NEEDLE, DIABETIC (BD ULTRA-FINE MINI PEN NEEDLE) 31 GAUGE X 3/16" NDLE    Use 5 a day       Start Date: 11/20/2019End Date: --    PROMETHAZINE (PHENERGAN) 25 MG TABLET    Take 1 tablet (25 mg total) by mouth every 8 (eight) hours as needed for Nausea. sedating       Start Date: 9/27/2019 End Date: --    SPIRONOLACTONE (ALDACTONE) 25 MG TABLET    Take 1 tablet (25 mg total) by mouth once daily.       Start Date: 11/18/2019End Date: 11/17/2020    TOUJEO SOLOSTAR U-300 INSULIN 300 UNIT/ML (1.5 ML) INPN PEN    Inject 75 units daily       Start Date: 11/11/2019End Date: --    TRIAZOLAM (HALCION) 0.25 MG TAB    Take 1 tablet by mouth at bedtime       Start Date: 12/5/2019 End Date: --    VALACYCLOVIR (VALTREX) 1000 MG TABLET    Take 1 tablet (1,000 mg total) by mouth once daily.       Start Date: 10/7/2019 End Date: 10/6/2020    VALSARTAN (DIOVAN) 320 MG TABLET    Take 1 tablet (320 mg total) by mouth once daily.       Start Date: 11/4/2019 End " Date: 11/3/2020   Discontinued Medications    ACYCLOVIR (ZOVIRAX) 400 MG TABLET    Take 1 tablet (400 mg total) by mouth 3 (three) times daily. for 10 days       Start Date: 10/7/2019 End Date: 12/9/2019    ALBUTEROL (PROVENTIL/VENTOLIN HFA) 90 MCG/ACTUATION INHALER    Inhale 2 puffs into the lungs every 4 (four) hours as needed.       Start Date: 5/2/2019  End Date: 12/9/2019    ALPRAZOLAM (XANAX) 2 MG TAB    Take 2 mg by mouth 2 (two) times daily.        Start Date: 8/11/2019 End Date: 12/9/2019    ALPRAZOLAM (XANAX) 2 MG TAB    Take 1 tablet by mouth twice daily       Start Date: 10/17/2019End Date: 12/9/2019    ALPRAZOLAM (XANAX) 2 MG TAB    Take 1 tablet by mouth twice daily       Start Date: 10/31/2019End Date: 12/9/2019    ARIPIPRAZOLE (ABILIFY) 5 MG TAB    Take 1 tablet by mouth every day       Start Date: 10/31/2019End Date: 12/9/2019    BENZTROPINE (COGENTIN) 1 MG TABLET    Take 1 mg by mouth nightly.       Start Date: 6/27/2019 End Date: 12/9/2019    BENZTROPINE (COGENTIN) 1 MG TABLET    Take 1 tablet by mouth at bedtime       Start Date: 10/17/2019End Date: 12/9/2019    BENZTROPINE (COGENTIN) 1 MG TABLET    Take 1 tablet by mouth at bedtime       Start Date: 10/31/2019End Date: 12/9/2019    DULOXETINE (CYMBALTA) 30 MG CAPSULE    Take 1 capsule by mouth twice daily       Start Date: 10/17/2019End Date: 12/9/2019    DULOXETINE (CYMBALTA) 60 MG CAPSULE    Take 60 mg by mouth 2 (two) times daily.       Start Date: 8/24/2017 End Date: 12/9/2019    EMPAGLIFLOZIN (JARDIANCE) 10 MG TAB    Take 1 tablet (10 mg) by mouth every morning.       Start Date: 5/2/2019  End Date: 12/9/2019    ESZOPICLONE (LUNESTA) 3 MG TAB    Take 1 tablet by mouth at bedtime       Start Date: 11/26/2019End Date: 12/9/2019    INSULIN DETEMIR U-100 (LEVEMIR FLEXTOUCH) 100 UNIT/ML (3 ML) SUBQ INPN PEN    Inject 76 Units into the skin every evening.       Start Date: 9/30/2019 End Date: 12/9/2019    LATUDA 120 MG TAB           Start Date:  8/11/2019 End Date: 12/9/2019    MIRTAZAPINE (REMERON) 45 MG TABLET    Take 45 mg by mouth every evening.       Start Date: 4/30/2019 End Date: 12/9/2019    MONTELUKAST (SINGULAIR) 10 MG TABLET    Take 1 tablet (10 mg total) by mouth nightly.       Start Date: 5/2/2019  End Date: 12/9/2019    NITROGLYCERIN (NITROSTAT) 0.4 MG SL TABLET    Dissolve 1 tablet under the tongue every 5 minutes as needed, up to 3 times. If chest pain persists, call 911.       Start Date: 10/26/2018End Date: 12/9/2019    NYSTATIN-TRIAMCINOLONE (MYCOLOG II) CREAM    Apply to affected area 2 times daily       Start Date: 10/30/2019End Date: 12/9/2019    OXYCODONE-ACETAMINOPHEN (PERCOCET) 7.5-325 MG PER TABLET    Take 1 tablet by mouth every 6 (six) hours as needed for Pain.       Start Date: 10/31/2019End Date: 12/9/2019    TRAMADOL (ULTRAM) 50 MG TABLET    Take 1 tablet (50 mg total) by mouth every 6 (six) hours as needed.       Start Date: 10/7/2019 End Date: 12/9/2019    ZOLPIDEM (AMBIEN) 10 MG TAB    Take 10 mg by mouth nightly as needed.       Start Date: --        End Date: 12/9/2019    ZOLPIDEM (AMBIEN) 10 MG TAB    Take 1 tablet by mouth at bedtime       Start Date: 10/31/2019End Date: 12/9/2019        Follow up in about 3 weeks (around 12/30/2019) for re-evaluate problem(s) discussed today.    Problem List Items Addressed This Visit        Neuro    Migraine with aura and without status migrainosus, not intractable (Chronic)    Relevant Medications    topiramate (TOPAMAX) 25 MG tablet    topiramate (TOPAMAX) 50 MG tablet (Start on 12/23/2019)       Psychiatric    Schizoaffective disorder, bipolar type (Chronic)    Overview     PSYCHIATRIST: Dr. Sabillon            Endocrine    Type 2 diabetes mellitus with hyperglycemia, with long-term current use of insulin (Chronic)    Current Assessment & Plan     Diabetes Management Status    Statin: Taking  ACE/ARB: Taking    Screening or Prevention Patient's value Goal Complete/Controlled?  "  HgA1C Testing and Control   Lab Results   Component Value Date    HGBA1C 9.9 (H) 11/18/2019      Annually/Less than 8% No   Lipid profile : 04/25/2019 Annually Yes   LDL control Lab Results   Component Value Date    LDLCALC 40.4 (L) 04/25/2019    Annually/Less than 100 mg/dl  Yes   Nephropathy screening Lab Results   Component Value Date    LABMICR 7.0 04/25/2019     Lab Results   Component Value Date    PROTEINUA Negative 08/26/2017    Annually Yes   Blood pressure BP Readings from Last 1 Encounters:   12/09/19 126/82    Less than 140/90 Yes   Dilated retinal exam : 05/22/2019 Annually Yes   Foot exam   : 05/16/2019 Annually Yes     Lab Results   Component Value Date    HGBA1C 9.9 (H) 11/18/2019    HGBA1C 8.4 (H) 10/14/2019    HGBA1C 7.7 (H) 06/24/2019    ESTGFRAFRICA >60 11/27/2019    EGFRNONAA >60 11/27/2019    MICALBCREAT 10.4 04/25/2019    LDLCALC 40.4 (L) 04/25/2019       HEALTH MAINTENANCE: Diabetic health maintenance interventions reviewed and are up to date except for:  There are no preventive care reminders to display for this patient.          Relevant Orders    Ambulatory Referral to Diabetes Education       Other    Nocturnal hypoxemia due to obesity - WITHOUT ALEN (Chronic)      Other Visit Diagnoses     Acute non-recurrent maxillary sinusitis    -  Primary    Relevant Medications    amoxicillin (AMOXIL) 500 MG capsule           REVIEW OF SYSTEMS  CONSTITUTIONAL: No objective fever reported.  PULMONARY: No hemoptysis or difficulty breathing reported.  CARDIOVASCULAR: No chest pain or orthopnea reported.     PHYSICAL EXAM  Vitals:    12/09/19 0826   BP: 126/82   BP Location: Right arm   Patient Position: Sitting   BP Method: Large (Manual)   Pulse: 96   Temp: 97.3 °F (36.3 °C)   TempSrc: Tympanic   SpO2: 97%   Weight: 91.4 kg (201 lb 8 oz)   Height: 4' 8" (1.422 m)     CONSTITUTIONAL: Vital signs noted. No apparent distress. Does not appear acutely ill or septic. Appears adequately hydrated.  EYES: " "Pupils equal and reactive. Extraocular movements intact. Sclerae anicteric. Lids and conjunctiva unremarkable.  ENT: External ENT grossly unremarkable. Ear canals and visualized tympanic membranes are unremarkable. Hearing grossly intact. Nasal mucosa pink-blue and boggy. Maxillary sinuses mildly tender to percussion. Oropharynx moist. Posterior oropharynx is lightly erythematous with cobblestoning, but without ulceration or exudate or significant asymmetry.  NECK: Neck supple without meningismus. Trachea midline. No significant cervical lymphadenopathy.  PULM: Lungs clear. Breathing unlabored.  HEART: Auscultation reveals regular rate and rhythm.  DERM: Skin warm and moist with normal turgor.     Documentation entered by me for this encounter may have been done in part using speech-recognition technology. Although I have made an effort to ensure accuracy, "sound like" errors may exist and should be interpreted in context. -DOMINIK Beach MD.   "

## 2019-12-10 ENCOUNTER — TELEPHONE (OUTPATIENT)
Dept: CARDIOLOGY | Facility: CLINIC | Age: 49
End: 2019-12-10

## 2019-12-10 ENCOUNTER — TELEPHONE (OUTPATIENT)
Dept: RHEUMATOLOGY | Facility: CLINIC | Age: 49
End: 2019-12-10

## 2019-12-10 NOTE — TELEPHONE ENCOUNTER
----- Message from Charline Vazquez sent at 12/10/2019  3:06 PM CST -----  Contact: pt  She's calling in regards to being in pain       pls call pt back at  [phone] (home)

## 2019-12-10 NOTE — TELEPHONE ENCOUNTER
"I spoke with pt. She is feeling "fine". Taking lasix and aldactone as instructed. Occasional positional dizziness. Educated on making positional changes slowly. Let her know to notify us if this continue or worsens or becomes bothersome. I gave her my direct # to call if any problems with symptoms to medications. Pt lost 3 lbs since starting fluid meds. Overall feeling "ok" though.   "

## 2019-12-16 ENCOUNTER — LAB VISIT (OUTPATIENT)
Dept: LAB | Facility: HOSPITAL | Age: 49
End: 2019-12-16
Attending: SURGERY
Payer: MEDICAID

## 2019-12-16 ENCOUNTER — OFFICE VISIT (OUTPATIENT)
Dept: SURGERY | Facility: CLINIC | Age: 49
End: 2019-12-16
Payer: MEDICAID

## 2019-12-16 VITALS
SYSTOLIC BLOOD PRESSURE: 116 MMHG | HEIGHT: 56 IN | BODY MASS INDEX: 45.21 KG/M2 | DIASTOLIC BLOOD PRESSURE: 73 MMHG | HEART RATE: 96 BPM | TEMPERATURE: 99 F | WEIGHT: 201 LBS

## 2019-12-16 DIAGNOSIS — K80.20 CALCULUS OF GALLBLADDER WITHOUT CHOLECYSTITIS WITHOUT OBSTRUCTION: ICD-10-CM

## 2019-12-16 DIAGNOSIS — I10 ESSENTIAL HYPERTENSION: Primary | ICD-10-CM

## 2019-12-16 LAB
BASOPHILS # BLD AUTO: 0.06 K/UL (ref 0–0.2)
BASOPHILS NFR BLD: 0.6 % (ref 0–1.9)
DIFFERENTIAL METHOD: ABNORMAL
EOSINOPHIL # BLD AUTO: 0.1 K/UL (ref 0–0.5)
EOSINOPHIL NFR BLD: 1.5 % (ref 0–8)
ERYTHROCYTE [DISTWIDTH] IN BLOOD BY AUTOMATED COUNT: 14.9 % (ref 11.5–14.5)
HCT VFR BLD AUTO: 40 % (ref 37–48.5)
HGB BLD-MCNC: 12.1 G/DL (ref 12–16)
IMM GRANULOCYTES # BLD AUTO: 0.03 K/UL (ref 0–0.04)
IMM GRANULOCYTES NFR BLD AUTO: 0.3 % (ref 0–0.5)
LYMPHOCYTES # BLD AUTO: 3.4 K/UL (ref 1–4.8)
LYMPHOCYTES NFR BLD: 37.1 % (ref 18–48)
MCH RBC QN AUTO: 27.3 PG (ref 27–31)
MCHC RBC AUTO-ENTMCNC: 30.3 G/DL (ref 32–36)
MCV RBC AUTO: 90 FL (ref 82–98)
MONOCYTES # BLD AUTO: 0.4 K/UL (ref 0.3–1)
MONOCYTES NFR BLD: 4.3 % (ref 4–15)
NEUTROPHILS # BLD AUTO: 5.2 K/UL (ref 1.8–7.7)
NEUTROPHILS NFR BLD: 56.2 % (ref 38–73)
NRBC BLD-RTO: 0 /100 WBC
PLATELET # BLD AUTO: 345 K/UL (ref 150–350)
PMV BLD AUTO: 10.5 FL (ref 9.2–12.9)
RBC # BLD AUTO: 4.43 M/UL (ref 4–5.4)
WBC # BLD AUTO: 9.25 K/UL (ref 3.9–12.7)

## 2019-12-16 PROCEDURE — 85025 COMPLETE CBC W/AUTO DIFF WBC: CPT

## 2019-12-16 PROCEDURE — 99999 PR PBB SHADOW E&M-EST. PATIENT-LVL V: ICD-10-PCS | Mod: PBBFAC,,, | Performed by: SURGERY

## 2019-12-16 PROCEDURE — 80053 COMPREHEN METABOLIC PANEL: CPT

## 2019-12-16 PROCEDURE — 99214 PR OFFICE/OUTPT VISIT, EST, LEVL IV, 30-39 MIN: ICD-10-PCS | Mod: S$PBB,,, | Performed by: SURGERY

## 2019-12-16 PROCEDURE — 99999 PR PBB SHADOW E&M-EST. PATIENT-LVL V: CPT | Mod: PBBFAC,,, | Performed by: SURGERY

## 2019-12-16 PROCEDURE — 99214 OFFICE O/P EST MOD 30 MIN: CPT | Mod: S$PBB,,, | Performed by: SURGERY

## 2019-12-16 PROCEDURE — 99215 OFFICE O/P EST HI 40 MIN: CPT | Mod: PBBFAC | Performed by: SURGERY

## 2019-12-16 PROCEDURE — 36415 COLL VENOUS BLD VENIPUNCTURE: CPT

## 2019-12-16 RX ORDER — SODIUM CHLORIDE 9 MG/ML
INJECTION, SOLUTION INTRAVENOUS CONTINUOUS
Status: CANCELLED | OUTPATIENT
Start: 2019-12-16

## 2019-12-16 RX ORDER — LIDOCAINE HYDROCHLORIDE 10 MG/ML
1 INJECTION, SOLUTION EPIDURAL; INFILTRATION; INTRACAUDAL; PERINEURAL ONCE
Status: DISCONTINUED | OUTPATIENT
Start: 2019-12-16 | End: 2020-01-03

## 2019-12-16 RX ORDER — INDOCYANINE GREEN AND WATER 25 MG
2.5 KIT INJECTION
Status: CANCELLED | OUTPATIENT
Start: 2020-01-03

## 2019-12-16 NOTE — H&P (VIEW-ONLY)
Patient ID: Yolanda Betts is a 49 y.o. female.    Symptomatic cholelithiasis    Chief Complaint: Follow-up      HPI:  Patient reports a history of abdominal pain. She describes the abdominal pain as being in the middle and right and left lower quadrants.  She describes this pain is spasm like.  It can occur after eating and it can be sharp at times.    The patient also describes a right upper quadrant pain that she says is sharp.  She also says the pain can be intermittent are constant.  The pain may occur after eating food.  It may also occur intermittently.  She does not describe any nausea vomiting constipation or diarrhea      The patient states that she gets chest pain and shortness of breath with any activity or with an anxiety attack.    The chest pain was evaluated by a stress test in January, Care everywhere section, which was negative.    Pulmonary function test show mild diffusion capacity decreases.    The patient is also concerned that her abdomen is protuberant.  She states this may be related to distension.  She also reports a bulge of the abdominal wall on the left side.  She is concerned that she has a hernia.    The patient has had an abdominoplasty    An ultrasound was done that showed gallstones      Review of Systems   Constitutional: Negative for appetite change, chills, fatigue, fever and unexpected weight change.   HENT: Negative for hearing loss and rhinorrhea.    Eyes: Negative for visual disturbance.   Respiratory: Positive for shortness of breath. Negative for apnea, cough and wheezing.    Cardiovascular: Positive for chest pain. Negative for palpitations.   Gastrointestinal: Positive for abdominal pain. Negative for abdominal distention, blood in stool, constipation, diarrhea, nausea and vomiting.   Genitourinary: Negative for dysuria, frequency and urgency.   Musculoskeletal: Negative for arthralgias and neck pain.   Skin: Negative for rash.   Neurological: Negative for seizures,  "weakness, numbness and headaches.   Hematological: Negative for adenopathy. Does not bruise/bleed easily.   Psychiatric/Behavioral: Negative for hallucinations. The patient is not nervous/anxious.        Current Outpatient Medications   Medication Sig Dispense Refill    ALPRAZolam (XANAX) 2 MG Tab Take 1 tablet by mouth twice daily 60 tablet 0    amLODIPine (NORVASC) 10 MG tablet Take 10 mg by mouth once daily.      amoxicillin (AMOXIL) 500 MG capsule Take 2 capsules (1,000 mg total) by mouth every 12 (twelve) hours. for 7 days 28 capsule 0    ARIPiprazole (ABILIFY) 10 MG Tab Take 1 tablet by mouth every day 30 tablet 0    aspirin (ECOTRIN) 81 MG EC tablet Take 81 mg by mouth once daily.      atorvastatin (LIPITOR) 20 MG tablet Take 1 tablet (20 mg total) by mouth every evening. 90 tablet 3    BD INSULIN SYRINGE ULTRA-FINE 1/2 mL 30 gauge x 1/2" Syrg   0    benztropine (COGENTIN) 1 MG tablet Take 1 tablet by mouth at bedtime 30 tablet 0    blood sugar diagnostic Strp Check blood glucose 4 times daily as directed and as needed (dispense insurance preferred brand or patient choice) 200 each 5    blood-glucose meter Misc Use to check blood sugars, give meter based on insurance specification 1 each 1    cloNIDine (CATAPRES) 0.1 MG tablet Take 1 tablet (0.1 mg total) by mouth every 6 (six) hours as needed (for SBP>150 or DBP>100 mmHg). 60 tablet 1    cycloSPORINE (RESTASIS) 0.05 % ophthalmic emulsion Place 0.4 mLs (1 drop total) into both eyes 2 (two) times daily. 60 each 11    DULoxetine (CYMBALTA) 60 MG capsule Take 1 capsule by mouth twice daily 60 capsule 0    ergocalciferol (ERGOCALCIFEROL) 50,000 unit Cap Take 1 capsule (50,000 Units total) by mouth every 14 (fourteen) days. 4 capsule 5    fish oil-omega-3 fatty acids 300-1,000 mg capsule Take 1 capsule by mouth once daily.      fluticasone propionate (FLONASE) 50 mcg/actuation nasal spray 2 sprays (100 mcg total) by Each Nare route once daily. 16 g " "11    frovatriptan (FROVA) 2.5 MG tablet Take one tablet orally. If recurs, may repeat after 2 hours. Max of 3 tabs in 24 hours. 9 tablet 2    furosemide (LASIX) 20 MG tablet Take 1 tablet (20 mg total) by mouth once daily. 30 tablet 11    gabapentin (NEURONTIN) 300 MG capsule Take 2 capsules (600 mg total) by mouth 3 (three) times daily. (Patient taking differently: Take 300 mg by mouth 2 (two) times daily. ) 90 capsule 3    hydrALAZINE (APRESOLINE) 25 MG tablet Take 1 tablet (25 mg total) by mouth every 12 (twelve) hours. 60 tablet 11    insulin aspart U-100 (NOVOLOG FLEXPEN U-100 INSULIN) 100 unit/mL (3 mL) InPn pen Inject before meals TID AC up to 30 units daily 15 mL 3    lancets 28 gauge Misc use as directed 3 times daily 100 each 11    LINZESS 145 mcg Cap capsule Take 145 mcg by mouth as needed.   3    liraglutide 0.6 mg/0.1 mL, 18 mg/3 mL, subq PNIJ (VICTOZA 2-MILENA) 0.6 mg/0.1 mL (18 mg/3 mL) PnIj Use 1.8mg under the skin daily 9 mL 3    lurasidone (LATUDA) 120 mg Tab Take 1 tablet by mouth at 5 pm with food 30 tablet 0    magnesium oxide (MAG-OX) 400 mg (241.3 mg magnesium) tablet Take 1 tablet (400 mg total) by mouth once daily. 30 tablet 3    metFORMIN (GLUCOPHAGE) 1000 MG tablet TAKE 1 TABLET (1,000 MG TOTAL) BY MOUTH 2 (TWO) TIMES DAILY. 180 tablet 11    metoprolol succinate (TOPROL-XL) 100 MG 24 hr tablet Take 2 tablets (200 mg total) by mouth every evening. 180 tablet 3    mirtazapine (REMERON) 45 MG tablet Take 1 tablet by mouth at bedtime 30 tablet 0    mometasone (ASMANEX TWISTHALER) 220 mcg (120 doses) AePB Inhale 2 puffs into the lungs 2 (two) times daily. Controller 1 each 11    MULTIVIT,CALC,MINS/IRON/FOLIC (DAILY MULTIPLE FOR WOMEN ORAL) Take 1 tablet by mouth once daily.      mupirocin (BACTROBAN) 2 % ointment Apply topically 3 (three) times daily. 30 g 1    pen needle, diabetic (BD ULTRA-FINE MINI PEN NEEDLE) 31 gauge x 3/16" Ndle Use 5 a day 200 each 3    promethazine " (PHENERGAN) 25 MG tablet Take 1 tablet (25 mg total) by mouth every 8 (eight) hours as needed for Nausea. sedating 14 tablet 0    spironolactone (ALDACTONE) 25 MG tablet Take 1 tablet (25 mg total) by mouth once daily. 30 tablet 1    topiramate (TOPAMAX) 25 MG tablet Take 1 tablet (25 mg total) by mouth every evening for 7 days, THEN 2 tablets (50 mg total) every evening for 7 days. 21 tablet 0    [START ON 12/23/2019] topiramate (TOPAMAX) 50 MG tablet Take 1 tablet (50 mg total) by mouth 2 (two) times daily. 60 tablet 11    TOUJEO SOLOSTAR U-300 INSULIN 300 unit/mL (1.5 mL) InPn pen Inject 75 units daily 4.5 mL 3    triazolam (HALCION) 0.25 MG Tab Take 1 tablet by mouth at bedtime 14 tablet 0    valACYclovir (VALTREX) 1000 MG tablet Take 1 tablet (1,000 mg total) by mouth once daily. 30 tablet 11    valsartan (DIOVAN) 320 MG tablet Take 1 tablet (320 mg total) by mouth once daily. 90 tablet 3    meclizine (ANTIVERT) 25 mg tablet Take 1 tablet (25 mg total) by mouth 3 (three) times daily as needed. 21 tablet 0     No current facility-administered medications for this visit.        Review of patient's allergies indicates:   Allergen Reactions    Codeine Itching    Lortab [hydrocodone-acetaminophen] Itching    Neuromuscular blockers, steroidal      some    Latex, natural rubber Rash    Morphine Rash     itching    Seconal [secobarbital sodium] Rash     itching    Tylox [oxycodone-acetaminophen] Rash       Past Medical History:   Diagnosis Date    Abnormal Pap smear of cervix     HPV genital warts    Anemia     Anxiety     Arthritis     Asthma 10/11/2016    Bipolar 1 disorder     Diabetes mellitus, type 2     Dyslipidemia associated with type 2 diabetes mellitus 5/13/2019    Genital warts     GERD (gastroesophageal reflux disease)     Herpes simplex virus (HSV) infection     Hypertension     Hypertension complicating diabetes 5/5/2019    Migraine with aura and without status migrainosus,  not intractable 3/21/2016    Mild persistent asthma without complication 10/11/2016    Morbid obesity with body mass index (BMI) of 45.0 to 49.9 in adult 8/3/2017    Obstructive sleep apnea     Schizoaffective disorder, bipolar type 2019    Seasonal allergic rhinitis due to pollen 2019    Type 2 diabetes mellitus with hyperglycemia, with long-term current use of insulin 2017    Type 2 diabetes mellitus with hyperglycemia, without long-term current use of insulin 2017       Past Surgical History:   Procedure Laterality Date    abcess removed right back      BELT ABDOMINOPLASTY      BREAST SURGERY Bilateral 1996    Reduction     SECTION      X 2    COLONOSCOPY      COLONOSCOPY N/A 2018    Procedure: colonoscopy for iron deficiency anemia;  Surgeon: Frankie Sanchez MD;  Location: [facility] ENDO;  Service: Endoscopy;  Laterality: N/A;    COLONOSCOPY N/A 2018    Procedure: COLONOSCOPY;  Surgeon: Frankie Sanchez MD;  Location: [facility] ENDO;  Service: Endoscopy;  Laterality: N/A;    HYSTERECTOMY      w/ BSO; hypermenorrhea    MOUTH SURGERY      OOPHORECTOMY      hyst/bso, hypermenorrhea    TUBAL LIGATION         Family History   Problem Relation Age of Onset    Diabetes Mother     Hyperlipidemia Mother     Hypertension Mother     Asthma Mother     COPD Mother     Glaucoma Mother     Thyroid disease Mother     Hypertension Father     Hyperlipidemia Father     Cancer Father         Brain, lung, liver, kidney    Heart disease Maternal Grandmother     Hyperlipidemia Maternal Grandmother     Hypertension Maternal Grandmother     Cataracts Maternal Grandmother     Diabetes Maternal Grandmother     Heart disease Maternal Grandfather     Hyperlipidemia Maternal Grandfather     Hypertension Maternal Grandfather     Glaucoma Maternal Grandfather     Cancer Maternal Grandfather     Cataracts Maternal Grandfather     Macular degeneration Maternal  Grandfather     Diabetes Maternal Grandfather     Heart disease Paternal Grandmother     Hyperlipidemia Paternal Grandmother     Hypertension Paternal Grandmother     Cataracts Paternal Grandmother     Heart disease Paternal Grandfather     Hyperlipidemia Paternal Grandfather     Hypertension Paternal Grandfather     Cataracts Paternal Grandfather     Breast cancer Maternal Cousin     Breast cancer Maternal Cousin     Breast cancer Maternal Cousin     Breast cancer Maternal Cousin        Social History     Socioeconomic History    Marital status: Single     Spouse name: Not on file    Number of children: Not on file    Years of education: Not on file    Highest education level: Not on file   Occupational History    Not on file   Social Needs    Financial resource strain: Not on file    Food insecurity:     Worry: Not on file     Inability: Not on file    Transportation needs:     Medical: Not on file     Non-medical: Not on file   Tobacco Use    Smoking status: Never Smoker    Smokeless tobacco: Never Used   Substance and Sexual Activity    Alcohol use: Yes     Alcohol/week: 0.0 standard drinks     Comment: socially    Drug use: No    Sexual activity: Not Currently     Partners: Male   Lifestyle    Physical activity:     Days per week: Not on file     Minutes per session: Not on file    Stress: Not on file   Relationships    Social connections:     Talks on phone: Not on file     Gets together: Not on file     Attends Christian service: Not on file     Active member of club or organization: Not on file     Attends meetings of clubs or organizations: Not on file     Relationship status: Not on file   Other Topics Concern    Not on file   Social History Narrative    Long-term care nurse       Vitals:    12/16/19 1358   BP: 116/73   Pulse: 96   Temp: 98.5 °F (36.9 °C)     Body mass index is 45.06 kg/m².      Physical Exam   Constitutional: No distress.   Morbidly obese   HENT:   Head:  Normocephalic.   Eyes: Pupils are equal, round, and reactive to light.   Neck: Normal range of motion. Neck supple.   Cardiovascular: Normal rate, regular rhythm and normal heart sounds.   Pulmonary/Chest: Effort normal and breath sounds normal.   Abdominal: Soft. Bowel sounds are normal. There is no tenderness.   Very obese and protuberant   incisions from an abdominal plasty    Mild right upper quadrant tenderness    No gypsy evidence of a hernia   Skin: Capillary refill takes less than 2 seconds.   Psychiatric: She has a normal mood and affect. Her behavior is normal. Thought content normal.   Vitals reviewed.    Gallbladder ultrasound was reviewed  Last liver function tests were reviewed.  Mild elevation of the transaminases normal bilirubin    Cardiac stress test from January 15th 2019.  Care everywhere section    IMPRESSIONS    No evidence of ischemia or infarct.    Normal left ventricular EF of 61% and normal end diastolic volume.    Pulmonary function tests were reviewed  Assessment & Plan:      Symptomatic cholelithiasis    Robotic cholecystectomy.    I discuss that removing the gallbladder would get rid of some but likely not all of her abdominal pain.  We would likely get rid of the right-sided pain that occurs after eating.  The lower abdominal pain may not resolve as this is likely unrelated to the gallbladder.    Need for surgery, risks benefits and complications were discussed    The risks, benefits and complications of robotic/laparoscopic cholecystectomy were discussed.  These included infection, bleeding, abscess and bile leak.  The need for open conversion was dicussed.  The rare possibility of a common bile duct injury and the need for additional procedures and/or surgery was reviewed.  All question were answered.  The patient has agreed to proceed.  Consent was obtained.    The patient has multiple medical problems as listed below.  She would definitely benefit from weight loss as directed by  her endocrinologist or primary care doctor    Patient Active Problem List   Diagnosis    Essential hypertension    GERD (gastroesophageal reflux disease)    Allergic rhinitis    Menopausal syndrome (hot flashes)    Migraine with aura and without status migrainosus, not intractable    Abnormal ECG    Psychophysiological insomnia    Sleep-related bruxism    Nightmares    Sleepwalking    Diabetic polyneuropathy associated with type 2 diabetes mellitus    Mild persistent asthma without complication    Bipolar 1 disorder    Anemia    Diffusion capacity of lung (dl), decreased    Hypertriglyceridemia    Anxiety    Type 2 diabetes mellitus with hyperglycemia, with long-term current use of insulin    Iron deficiency anemia    Screen for colon cancer    Schizoaffective disorder, bipolar type    Vitamin D deficiency    Fibromyalgia    Hypertension complicating diabetes    Dyslipidemia associated with type 2 diabetes mellitus    Seasonal allergic rhinitis due to pollen    Nocturnal hypoxemia due to obesity - WITHOUT ALEN    Obesity hypoventilation syndrome    Hyperaldosteronism

## 2019-12-16 NOTE — PROGRESS NOTES
Patient ID: Yolanda Betts is a 49 y.o. female.    Symptomatic cholelithiasis    Chief Complaint: Follow-up      HPI:  Patient reports a history of abdominal pain. She describes the abdominal pain as being in the middle and right and left lower quadrants.  She describes this pain is spasm like.  It can occur after eating and it can be sharp at times.    The patient also describes a right upper quadrant pain that she says is sharp.  She also says the pain can be intermittent are constant.  The pain may occur after eating food.  It may also occur intermittently.  She does not describe any nausea vomiting constipation or diarrhea      The patient states that she gets chest pain and shortness of breath with any activity or with an anxiety attack.    The chest pain was evaluated by a stress test in January, Care everywhere section, which was negative.    Pulmonary function test show mild diffusion capacity decreases.    The patient is also concerned that her abdomen is protuberant.  She states this may be related to distension.  She also reports a bulge of the abdominal wall on the left side.  She is concerned that she has a hernia.    The patient has had an abdominoplasty    An ultrasound was done that showed gallstones      Review of Systems   Constitutional: Negative for appetite change, chills, fatigue, fever and unexpected weight change.   HENT: Negative for hearing loss and rhinorrhea.    Eyes: Negative for visual disturbance.   Respiratory: Positive for shortness of breath. Negative for apnea, cough and wheezing.    Cardiovascular: Positive for chest pain. Negative for palpitations.   Gastrointestinal: Positive for abdominal pain. Negative for abdominal distention, blood in stool, constipation, diarrhea, nausea and vomiting.   Genitourinary: Negative for dysuria, frequency and urgency.   Musculoskeletal: Negative for arthralgias and neck pain.   Skin: Negative for rash.   Neurological: Negative for seizures,  "weakness, numbness and headaches.   Hematological: Negative for adenopathy. Does not bruise/bleed easily.   Psychiatric/Behavioral: Negative for hallucinations. The patient is not nervous/anxious.        Current Outpatient Medications   Medication Sig Dispense Refill    ALPRAZolam (XANAX) 2 MG Tab Take 1 tablet by mouth twice daily 60 tablet 0    amLODIPine (NORVASC) 10 MG tablet Take 10 mg by mouth once daily.      amoxicillin (AMOXIL) 500 MG capsule Take 2 capsules (1,000 mg total) by mouth every 12 (twelve) hours. for 7 days 28 capsule 0    ARIPiprazole (ABILIFY) 10 MG Tab Take 1 tablet by mouth every day 30 tablet 0    aspirin (ECOTRIN) 81 MG EC tablet Take 81 mg by mouth once daily.      atorvastatin (LIPITOR) 20 MG tablet Take 1 tablet (20 mg total) by mouth every evening. 90 tablet 3    BD INSULIN SYRINGE ULTRA-FINE 1/2 mL 30 gauge x 1/2" Syrg   0    benztropine (COGENTIN) 1 MG tablet Take 1 tablet by mouth at bedtime 30 tablet 0    blood sugar diagnostic Strp Check blood glucose 4 times daily as directed and as needed (dispense insurance preferred brand or patient choice) 200 each 5    blood-glucose meter Misc Use to check blood sugars, give meter based on insurance specification 1 each 1    cloNIDine (CATAPRES) 0.1 MG tablet Take 1 tablet (0.1 mg total) by mouth every 6 (six) hours as needed (for SBP>150 or DBP>100 mmHg). 60 tablet 1    cycloSPORINE (RESTASIS) 0.05 % ophthalmic emulsion Place 0.4 mLs (1 drop total) into both eyes 2 (two) times daily. 60 each 11    DULoxetine (CYMBALTA) 60 MG capsule Take 1 capsule by mouth twice daily 60 capsule 0    ergocalciferol (ERGOCALCIFEROL) 50,000 unit Cap Take 1 capsule (50,000 Units total) by mouth every 14 (fourteen) days. 4 capsule 5    fish oil-omega-3 fatty acids 300-1,000 mg capsule Take 1 capsule by mouth once daily.      fluticasone propionate (FLONASE) 50 mcg/actuation nasal spray 2 sprays (100 mcg total) by Each Nare route once daily. 16 g " "11    frovatriptan (FROVA) 2.5 MG tablet Take one tablet orally. If recurs, may repeat after 2 hours. Max of 3 tabs in 24 hours. 9 tablet 2    furosemide (LASIX) 20 MG tablet Take 1 tablet (20 mg total) by mouth once daily. 30 tablet 11    gabapentin (NEURONTIN) 300 MG capsule Take 2 capsules (600 mg total) by mouth 3 (three) times daily. (Patient taking differently: Take 300 mg by mouth 2 (two) times daily. ) 90 capsule 3    hydrALAZINE (APRESOLINE) 25 MG tablet Take 1 tablet (25 mg total) by mouth every 12 (twelve) hours. 60 tablet 11    insulin aspart U-100 (NOVOLOG FLEXPEN U-100 INSULIN) 100 unit/mL (3 mL) InPn pen Inject before meals TID AC up to 30 units daily 15 mL 3    lancets 28 gauge Misc use as directed 3 times daily 100 each 11    LINZESS 145 mcg Cap capsule Take 145 mcg by mouth as needed.   3    liraglutide 0.6 mg/0.1 mL, 18 mg/3 mL, subq PNIJ (VICTOZA 2-MILENA) 0.6 mg/0.1 mL (18 mg/3 mL) PnIj Use 1.8mg under the skin daily 9 mL 3    lurasidone (LATUDA) 120 mg Tab Take 1 tablet by mouth at 5 pm with food 30 tablet 0    magnesium oxide (MAG-OX) 400 mg (241.3 mg magnesium) tablet Take 1 tablet (400 mg total) by mouth once daily. 30 tablet 3    metFORMIN (GLUCOPHAGE) 1000 MG tablet TAKE 1 TABLET (1,000 MG TOTAL) BY MOUTH 2 (TWO) TIMES DAILY. 180 tablet 11    metoprolol succinate (TOPROL-XL) 100 MG 24 hr tablet Take 2 tablets (200 mg total) by mouth every evening. 180 tablet 3    mirtazapine (REMERON) 45 MG tablet Take 1 tablet by mouth at bedtime 30 tablet 0    mometasone (ASMANEX TWISTHALER) 220 mcg (120 doses) AePB Inhale 2 puffs into the lungs 2 (two) times daily. Controller 1 each 11    MULTIVIT,CALC,MINS/IRON/FOLIC (DAILY MULTIPLE FOR WOMEN ORAL) Take 1 tablet by mouth once daily.      mupirocin (BACTROBAN) 2 % ointment Apply topically 3 (three) times daily. 30 g 1    pen needle, diabetic (BD ULTRA-FINE MINI PEN NEEDLE) 31 gauge x 3/16" Ndle Use 5 a day 200 each 3    promethazine " (PHENERGAN) 25 MG tablet Take 1 tablet (25 mg total) by mouth every 8 (eight) hours as needed for Nausea. sedating 14 tablet 0    spironolactone (ALDACTONE) 25 MG tablet Take 1 tablet (25 mg total) by mouth once daily. 30 tablet 1    topiramate (TOPAMAX) 25 MG tablet Take 1 tablet (25 mg total) by mouth every evening for 7 days, THEN 2 tablets (50 mg total) every evening for 7 days. 21 tablet 0    [START ON 12/23/2019] topiramate (TOPAMAX) 50 MG tablet Take 1 tablet (50 mg total) by mouth 2 (two) times daily. 60 tablet 11    TOUJEO SOLOSTAR U-300 INSULIN 300 unit/mL (1.5 mL) InPn pen Inject 75 units daily 4.5 mL 3    triazolam (HALCION) 0.25 MG Tab Take 1 tablet by mouth at bedtime 14 tablet 0    valACYclovir (VALTREX) 1000 MG tablet Take 1 tablet (1,000 mg total) by mouth once daily. 30 tablet 11    valsartan (DIOVAN) 320 MG tablet Take 1 tablet (320 mg total) by mouth once daily. 90 tablet 3    meclizine (ANTIVERT) 25 mg tablet Take 1 tablet (25 mg total) by mouth 3 (three) times daily as needed. 21 tablet 0     No current facility-administered medications for this visit.        Review of patient's allergies indicates:   Allergen Reactions    Codeine Itching    Lortab [hydrocodone-acetaminophen] Itching    Neuromuscular blockers, steroidal      some    Latex, natural rubber Rash    Morphine Rash     itching    Seconal [secobarbital sodium] Rash     itching    Tylox [oxycodone-acetaminophen] Rash       Past Medical History:   Diagnosis Date    Abnormal Pap smear of cervix     HPV genital warts    Anemia     Anxiety     Arthritis     Asthma 10/11/2016    Bipolar 1 disorder     Diabetes mellitus, type 2     Dyslipidemia associated with type 2 diabetes mellitus 5/13/2019    Genital warts     GERD (gastroesophageal reflux disease)     Herpes simplex virus (HSV) infection     Hypertension     Hypertension complicating diabetes 5/5/2019    Migraine with aura and without status migrainosus,  not intractable 3/21/2016    Mild persistent asthma without complication 10/11/2016    Morbid obesity with body mass index (BMI) of 45.0 to 49.9 in adult 8/3/2017    Obstructive sleep apnea     Schizoaffective disorder, bipolar type 2019    Seasonal allergic rhinitis due to pollen 2019    Type 2 diabetes mellitus with hyperglycemia, with long-term current use of insulin 2017    Type 2 diabetes mellitus with hyperglycemia, without long-term current use of insulin 2017       Past Surgical History:   Procedure Laterality Date    abcess removed right back      BELT ABDOMINOPLASTY      BREAST SURGERY Bilateral 1996    Reduction     SECTION      X 2    COLONOSCOPY      COLONOSCOPY N/A 2018    Procedure: colonoscopy for iron deficiency anemia;  Surgeon: Frankie Sanchez MD;  Location: [facility] END[facility];  Service: Endoscopy;  Laterality: N/A;    COLONOSCOPY N/A 2018    Procedure: COLONOSCOPY;  Surgeon: Frankie Sanchez MD;  Location: [facility];  Service: Endoscopy;  Laterality: N/A;    HYSTERECTOMY      w/ BSO; hypermenorrhea    MOUTH SURGERY      OOPHORECTOMY      hyst/bso, hypermenorrhea    TUBAL LIGATION         Family History   Problem Relation Age of Onset    Diabetes Mother     Hyperlipidemia Mother     Hypertension Mother     Asthma Mother     COPD Mother     Glaucoma Mother     Thyroid disease Mother     Hypertension Father     Hyperlipidemia Father     Cancer Father         Brain, lung, liver, kidney    Heart disease Maternal Grandmother     Hyperlipidemia Maternal Grandmother     Hypertension Maternal Grandmother     Cataracts Maternal Grandmother     Diabetes Maternal Grandmother     Heart disease Maternal Grandfather     Hyperlipidemia Maternal Grandfather     Hypertension Maternal Grandfather     Glaucoma Maternal Grandfather     Cancer Maternal Grandfather     Cataracts Maternal Grandfather     Macular degeneration Maternal  Grandfather     Diabetes Maternal Grandfather     Heart disease Paternal Grandmother     Hyperlipidemia Paternal Grandmother     Hypertension Paternal Grandmother     Cataracts Paternal Grandmother     Heart disease Paternal Grandfather     Hyperlipidemia Paternal Grandfather     Hypertension Paternal Grandfather     Cataracts Paternal Grandfather     Breast cancer Maternal Cousin     Breast cancer Maternal Cousin     Breast cancer Maternal Cousin     Breast cancer Maternal Cousin        Social History     Socioeconomic History    Marital status: Single     Spouse name: Not on file    Number of children: Not on file    Years of education: Not on file    Highest education level: Not on file   Occupational History    Not on file   Social Needs    Financial resource strain: Not on file    Food insecurity:     Worry: Not on file     Inability: Not on file    Transportation needs:     Medical: Not on file     Non-medical: Not on file   Tobacco Use    Smoking status: Never Smoker    Smokeless tobacco: Never Used   Substance and Sexual Activity    Alcohol use: Yes     Alcohol/week: 0.0 standard drinks     Comment: socially    Drug use: No    Sexual activity: Not Currently     Partners: Male   Lifestyle    Physical activity:     Days per week: Not on file     Minutes per session: Not on file    Stress: Not on file   Relationships    Social connections:     Talks on phone: Not on file     Gets together: Not on file     Attends [employer] service: Not on file     Active member of club or organization: Not on file     Attends meetings of clubs or organizations: Not on file     Relationship status: Not on file   Other Topics Concern    Not on file   Social History Narrative    Long-term care nurse       Vitals:    12/16/19 1358   BP: 116/73   Pulse: 96   Temp: 98.5 °F (36.9 °C)     Body mass index is 45.06 kg/m².      Physical Exam   Constitutional: No distress.   Morbidly obese   HENT:   Head:  Normocephalic.   Eyes: Pupils are equal, round, and reactive to light.   Neck: Normal range of motion. Neck supple.   Cardiovascular: Normal rate, regular rhythm and normal heart sounds.   Pulmonary/Chest: Effort normal and breath sounds normal.   Abdominal: Soft. Bowel sounds are normal. There is no tenderness.   Very obese and protuberant   incisions from an abdominal plasty    Mild right upper quadrant tenderness    No gypsy evidence of a hernia   Skin: Capillary refill takes less than 2 seconds.   Psychiatric: She has a normal mood and affect. Her behavior is normal. Thought content normal.   Vitals reviewed.    Gallbladder ultrasound was reviewed  Last liver function tests were reviewed.  Mild elevation of the transaminases normal bilirubin    Cardiac stress test from January 15th 2019.  Care everywhere section    IMPRESSIONS    No evidence of ischemia or infarct.    Normal left ventricular EF of 61% and normal end diastolic volume.    Pulmonary function tests were reviewed  Assessment & Plan:      Symptomatic cholelithiasis    Robotic cholecystectomy.    I discuss that removing the gallbladder would get rid of some but likely not all of her abdominal pain.  We would likely get rid of the right-sided pain that occurs after eating.  The lower abdominal pain may not resolve as this is likely unrelated to the gallbladder.    Need for surgery, risks benefits and complications were discussed    The risks, benefits and complications of robotic/laparoscopic cholecystectomy were discussed.  These included infection, bleeding, abscess and bile leak.  The need for open conversion was dicussed.  The rare possibility of a common bile duct injury and the need for additional procedures and/or surgery was reviewed.  All question were answered.  The patient has agreed to proceed.  Consent was obtained.    The patient has multiple medical problems as listed below.  She would definitely benefit from weight loss as directed by  her endocrinologist or primary care doctor    Patient Active Problem List   Diagnosis    Essential hypertension    GERD (gastroesophageal reflux disease)    Allergic rhinitis    Menopausal syndrome (hot flashes)    Migraine with aura and without status migrainosus, not intractable    Abnormal ECG    Psychophysiological insomnia    Sleep-related bruxism    Nightmares    Sleepwalking    Diabetic polyneuropathy associated with type 2 diabetes mellitus    Mild persistent asthma without complication    Bipolar 1 disorder    Anemia    Diffusion capacity of lung (dl), decreased    Hypertriglyceridemia    Anxiety    Type 2 diabetes mellitus with hyperglycemia, with long-term current use of insulin    Iron deficiency anemia    Screen for colon cancer    Schizoaffective disorder, bipolar type    Vitamin D deficiency    Fibromyalgia    Hypertension complicating diabetes    Dyslipidemia associated with type 2 diabetes mellitus    Seasonal allergic rhinitis due to pollen    Nocturnal hypoxemia due to obesity - WITHOUT ALEN    Obesity hypoventilation syndrome    Hyperaldosteronism

## 2019-12-16 NOTE — PATIENT INSTRUCTIONS
"Surgery scheduled for January 3rd at 7:00 a.m. in the morning.  The hospital call you with the time of arrival.    Please stop your aspirin on December 26     No solid food after midnight on January 2nd but you may have clear liquids up to 3 hr before your arrival time      Do not take your insulin or your metformin on the morning of surgery.    On the morning of surgery take all your other medications for the sip of water    If any of your preop labs are abnormal we will let you know    [facility]  Instructions for Patients and Families    You are invited to share your experience with me and my staff.  If you receive a survey in the mail, please return it at your convenience, or complete a brief survey on [employer].  We value your opinion!        Did you know that [facility] can be used to make appointments?  Just select "Schedule Appointment" under the "Visits" menu.    Notify you if any of your preop laboratories show abnormalities.  I    Before surgery:  The pre-op nurse from the hospital will call you before the day of your surgery to confirm your arrival time.  The time of your surgery may change due to emergencies or other unforeseen events.  Do not eat or drink anything after midnight the night before your procedure, except for clear liquids up to three hours before your surgery time, and sips of water with medication.  If you are not diabetic, it is recommended that you drink a glass of clear fruit juice (apple, grape, cranberry, not orange) three hours before your surgery time, but nothing after that.  If you are diabetic, you may have water or sugar-free clear liquids such as Crystal Light up to three hours before your surgery time.    Day of Surgery:  · You will go to a pre-op area where an IV will be started and you will speak to the anesthesiologist and surgeon.  · Your family will be updated throughout the operation.  After surgery, your family member may be taken to a " private room for consultation with the surgeon.  This is for the privacy of your medical information and does not necessarily mean there is anything wrong.    If your incisions have:  · Glue:  You may take a bath or shower immediately and wash your skin as you normally do.  The glue will eventually crumble or peel off. Do not let your incisions soak under water.  · Strips: Leave them on, but it is OK if they fall off on their own. It is OK to get them wet 48 hours after surgery.  · Bandage: You may remove it 2 days after your surgery, and then you may leave the incision open and take a shower or bath, unless otherwise instructed. If your bandage has clear plastic, you may shower with it on and then remove it 2 days after surgery.    Activities  · Walking is recommended after surgery; bed rest is not recommended unless specifically ordered.  · If you have had abdominal surgery, do not lift over 20 pounds for 4 weeks after surgery.  · If you have had hernia surgery, do not lift over 20 pounds for 6 weeks after surgery.  · You may drive when you are off your post-operative pain medication.  · Do not smoke after surgery, it decreases your ability to heal and increases the risk of infection and pneumonia.    Diet:  Drink lots of fluids after surgery.  You might not have much of an appetite at first, you may eat regular food when you feel ready, unless you are given special diet instructions.    Post-operative symptoms and medications  · It is safe to take over-the-counter medications for constipation, heartburn, sleep, or itching if needed.  Prescription pain medication may contain acetaminophen (Tylenol), so you should not take additional acetaminophen (Tylenol) at the same time as your pain medication.  · You may experience nausea, low fever/chills, and clear drainage from your incision, sometimes up to a month after surgery.  Notify our office if you have fever over 101 degrees, worsening redness around your incision,  "thick cloudy drainage, or inability to drink any liquids.  · You will experience some level of pain after surgery.  Your pain medication should help with the pain, but may not be able to eliminate it entirely.  Pain will decrease with time, and most pain will be gone by 4 to 6 weeks after surgery.  · We are not able to call in prescriptions for pain medication after hours or on weekends.  If your pain medication is ineffective or you will run out soon and need a refill, please call our office at [phone].  We are not able to replace pain medication that has been lost or stolen.    After surgery, you will either be discharged home or admitted to the hospital.  If you are admitted to the hospital, one of the surgeons or a physician assistant will see you once a day.  Due to scheduled surgery, we may see you in the afternoon or at night; however, your nurse is able to page us at any time.  If you feel there is a situation that is not being addressed properly, please dial [phone] from the phone in your room.    Follow-up appointment  · You will see your surgeon or a physician assistant in clinic for a follow-up appointment at either our [facility] (off [facility]) or [facility] (off [facility]) locations, usually between one and four weeks after your surgery.  · The hospital nurses can make your follow up appointment, or you can make it online at [email or web address] or call [phone].  · If you have a smartphone with the [employer] gill, please let us know if you would like to do a phone visit instead of a post-op office visit.    If you are signed up for [facility], install the "[employer]" gill to access your test results, send messages to your doctors, and schedule appointments from your smartphone!    "

## 2019-12-17 DIAGNOSIS — E55.9 VITAMIN D INSUFFICIENCY: ICD-10-CM

## 2019-12-17 DIAGNOSIS — I10 ESSENTIAL HYPERTENSION: ICD-10-CM

## 2019-12-17 DIAGNOSIS — B00.9 HSV INFECTION: Primary | ICD-10-CM

## 2019-12-17 DIAGNOSIS — R11.2 NAUSEA AND VOMITING, INTRACTABILITY OF VOMITING NOT SPECIFIED, UNSPECIFIED VOMITING TYPE: ICD-10-CM

## 2019-12-17 DIAGNOSIS — G43.119 INTRACTABLE MIGRAINE WITH AURA WITHOUT STATUS MIGRAINOSUS: ICD-10-CM

## 2019-12-17 DIAGNOSIS — M79.7 FIBROMYALGIA: Chronic | ICD-10-CM

## 2019-12-17 LAB
ALBUMIN SERPL BCP-MCNC: 3.9 G/DL (ref 3.5–5.2)
ALP SERPL-CCNC: 85 U/L (ref 55–135)
ALT SERPL W/O P-5'-P-CCNC: 66 U/L (ref 10–44)
ANION GAP SERPL CALC-SCNC: 12 MMOL/L (ref 8–16)
AST SERPL-CCNC: 61 U/L (ref 10–40)
BILIRUB SERPL-MCNC: 0.3 MG/DL (ref 0.1–1)
BUN SERPL-MCNC: 10 MG/DL (ref 6–20)
CALCIUM SERPL-MCNC: 10 MG/DL (ref 8.7–10.5)
CHLORIDE SERPL-SCNC: 97 MMOL/L (ref 95–110)
CO2 SERPL-SCNC: 28 MMOL/L (ref 23–29)
CREAT SERPL-MCNC: 0.8 MG/DL (ref 0.5–1.4)
EST. GFR  (AFRICAN AMERICAN): >60 ML/MIN/1.73 M^2
EST. GFR  (NON AFRICAN AMERICAN): >60 ML/MIN/1.73 M^2
GLUCOSE SERPL-MCNC: 232 MG/DL (ref 70–110)
POTASSIUM SERPL-SCNC: 4.4 MMOL/L (ref 3.5–5.1)
PROT SERPL-MCNC: 7.8 G/DL (ref 6–8.4)
SODIUM SERPL-SCNC: 137 MMOL/L (ref 136–145)

## 2019-12-17 RX ORDER — INSULIN ASPART 100 [IU]/ML
INJECTION, SOLUTION INTRAVENOUS; SUBCUTANEOUS
Qty: 15 ML | Refills: 3 | Status: SHIPPED | OUTPATIENT
Start: 2019-12-17 | End: 2020-05-19 | Stop reason: SDUPTHER

## 2019-12-17 RX ORDER — CLONIDINE HYDROCHLORIDE 0.1 MG/1
0.1 TABLET ORAL EVERY 6 HOURS PRN
Qty: 60 TABLET | Refills: 1 | OUTPATIENT
Start: 2019-12-17 | End: 2020-12-16

## 2019-12-17 RX ORDER — ERGOCALCIFEROL 1.25 MG/1
50000 CAPSULE ORAL
Qty: 4 CAPSULE | Refills: 5 | OUTPATIENT
Start: 2019-12-17

## 2019-12-17 RX ORDER — PEN NEEDLE, DIABETIC 30 GX3/16"
NEEDLE, DISPOSABLE MISCELLANEOUS
Qty: 200 EACH | Refills: 3 | Status: SHIPPED | OUTPATIENT
Start: 2019-12-17 | End: 2020-09-16 | Stop reason: SDUPTHER

## 2019-12-17 RX ORDER — GABAPENTIN 300 MG/1
600 CAPSULE ORAL 3 TIMES DAILY
Qty: 90 CAPSULE | Refills: 3 | Status: SHIPPED | OUTPATIENT
Start: 2019-12-17 | End: 2019-12-19 | Stop reason: SDUPTHER

## 2019-12-17 RX ORDER — PROMETHAZINE HYDROCHLORIDE 25 MG/1
25 TABLET ORAL EVERY 8 HOURS PRN
Qty: 14 TABLET | Refills: 0 | OUTPATIENT
Start: 2019-12-17

## 2019-12-17 RX ORDER — FROVATRIPTAN SUCCINATE 2.5 MG/1
TABLET, FILM COATED ORAL
Qty: 9 TABLET | Refills: 2 | OUTPATIENT
Start: 2019-12-17

## 2019-12-17 RX ORDER — VALACYCLOVIR HYDROCHLORIDE 1 G/1
1000 TABLET, FILM COATED ORAL DAILY
Qty: 30 TABLET | Refills: 11 | Status: SHIPPED | OUTPATIENT
Start: 2019-12-17 | End: 2021-01-04 | Stop reason: SDUPTHER

## 2019-12-17 NOTE — TELEPHONE ENCOUNTER
Called and left a voicemail for patient to see which pharmacy she would like her prescriptions sent to.

## 2019-12-17 NOTE — TELEPHONE ENCOUNTER
REFILL REFUSED. REASON:  She should no longer be taking the clonidine.  She should have plenty of refills of the frovatriptan.  If she is out of the ergocalciferol (vitamin D2), that means she has been taking it more frequently than as directed (every 14 days).     Because her conditions are not simple and straightforward, I recommend that we handle refills during office appointments. Please instruct her to bring all of her current prescription bottles with her to each appointment.    Her next appointment with me is currently scheduled for Monday, December 30, 2019 at 8:20 AM.    Requested Prescriptions     Refused Prescriptions Disp Refills    promethazine (PHENERGAN) 25 MG tablet 14 tablet 0     Sig: Take 1 tablet (25 mg total) by mouth every 8 (eight) hours as needed for Nausea. sedating     Refused By: CHARLA SHEN     Reason for Refusal: Patient needs an appointment    frovatriptan (FROVA) 2.5 MG tablet 9 tablet 2     Sig: Take one tablet orally. If recurs, may repeat after 2 hours. Max of 3 tabs in 24 hours.     Refused By: CHARLA SHEN     Reason for Refusal: Refill not appropriate    cloNIDine (CATAPRES) 0.1 MG tablet 60 tablet 1     Sig: Take 1 tablet (0.1 mg total) by mouth every 6 (six) hours as needed (for SBP>150 or DBP>100 mmHg).     Refused By: CHARLA SHEN     Reason for Refusal: Refill not appropriate    ergocalciferol (ERGOCALCIFEROL) 50,000 unit Cap 4 capsule 5     Sig: Take 1 capsule (50,000 Units total) by mouth every 14 (fourteen) days.     Refused By: CHARLA SHEN     Reason for Refusal: Refill not appropriate

## 2019-12-18 ENCOUNTER — NUTRITION (OUTPATIENT)
Dept: DIABETES | Facility: CLINIC | Age: 49
End: 2019-12-18
Payer: MEDICAID

## 2019-12-18 VITALS — WEIGHT: 202.38 LBS | HEIGHT: 56 IN | BODY MASS INDEX: 45.53 KG/M2

## 2019-12-18 DIAGNOSIS — Z79.4 ENCOUNTER FOR LONG-TERM (CURRENT) USE OF INSULIN: ICD-10-CM

## 2019-12-18 DIAGNOSIS — E11.65 UNCONTROLLED TYPE 2 DIABETES MELLITUS WITH HYPERGLYCEMIA: Primary | ICD-10-CM

## 2019-12-18 PROCEDURE — 99999 PR PBB SHADOW E&M-EST. PATIENT-LVL IV: CPT | Mod: PBBFAC,,, | Performed by: DIETITIAN, REGISTERED

## 2019-12-18 PROCEDURE — 99214 OFFICE O/P EST MOD 30 MIN: CPT | Mod: PBBFAC | Performed by: DIETITIAN, REGISTERED

## 2019-12-18 PROCEDURE — G0108 DIAB MANAGE TRN  PER INDIV: HCPCS | Mod: PBBFAC | Performed by: DIETITIAN, REGISTERED

## 2019-12-18 PROCEDURE — 99999 PR PBB SHADOW E&M-EST. PATIENT-LVL IV: ICD-10-PCS | Mod: PBBFAC,,, | Performed by: DIETITIAN, REGISTERED

## 2019-12-18 NOTE — PROGRESS NOTES
Diabetes Education  Author: Yara Sanchez RD, CDE  Date: 12/18/2019    Diabetes Care Management Summary  Diabetes Education Record Assessment/Progress: Comprehensive/Group  Current Diabetes Risk Level: Moderate     Last A1c:   Lab Results   Component Value Date    HGBA1C 9.9 (H) 11/18/2019     Last visit with Diabetes Educator:  Initial assessment visit [facility] 6/13/19 w/ A1C 7.7 (6/24/19)    Diabetes Type  Diabetes Type : Type II(Noted upcoming gallbladder surgery in Jan)    Diabetes History  Diabetes Diagnosis: >10 years(>20 yrs)  Current Treatment: Oral Medication, Diet, Injectable, Exercise, Insulin(Metformin 1000mg 1tab twice daily, victoza 1.8 mg daily, toujeo 75 units daily, novolog ac 30 units )  Reviewed Problem List with Patient: Yes    Health Maintenance was reviewed today with patient. Discussed with patient importance of routine eye exams, foot exams/foot care, blood work (i.e.: A1c, microalbumin, and lipid), dental visits, yearly flu vaccine, and pneumonia vaccine as indicated by PCP. Patient verbalized understanding.     Health Maintenance Topics with due status: Not Due       Topic Last Completion Date    TETANUS VACCINE 12/21/2017    Colonoscopy 02/28/2018    Lipid Panel 04/25/2019    Mammogram 05/02/2019    Foot Exam 05/16/2019    Eye Exam 05/22/2019    Hemoglobin A1c 11/18/2019     There are no preventive care reminders to display for this patient.    Nutrition  Meal Planning: (Intake ~8674-1701 cals/d. Excess carb, fat and sodium from irregular meal patterns, portions, sugary marco, food preparation. Pt reports decrease fast foods and dining out freq. She disliked MR shake; no use MR shake/entrealbert. )  Meal Plan 24 Hour Recall - Breakfast: 1pkt instant oatmeal w/ 1/4 cup rolled oats OR eggs, 2slc toast (wheat) - water  Meal Plan 24 Hour Recall - Lunch: hambuger, fries (fast foods) OR shrimp etoufee w/ rice, broccoli medley OR pb/j on wheat bread  Meal Plan 24 Hour Recall - Snack: marco: water,  reg gingerale     Monitoring   Self Monitoring : Per recall, fst -270; acl 180-220; acd 250-300.   Blood Glucose Logs: No  In the last month, how often have you had a low blood sugar reaction?: never    Exercise   Frequency: Never    Current Diabetes Treatment   Current Treatment: Oral Medication, Diet, Injectable, Exercise, Insulin(Metformin 1000mg 1tab twice daily, victoza 1.8 mg daily, toujeo 75 units daily, novolog ac 30 units )    Social History  Preferred Learning Method: Face to Face  Primary Support: Self  Smoking Status: Never a Smoker  Alcohol Use: Rarely     Barriers to Change  Barriers to Change: None  Learning Challenges : None    Readiness to Learn   Readiness to Learn : Eager    Cultural Influences  Cultural Influences: No    Diabetes Education Assessment/Progress  Diabetes Disease Process (diabetes disease process and treatment options): Discussion, Individual Session, Demonstrates Understanding/Competency(verbalizes/demonstrates)  Nutrition (Incorporating nutritional management into one's lifestyle): Discussion, Individual Session, Demonstrates Understanding/Competency (verbalizes/demonstrates)  Physical Activity (incorporating physical activity into one's lifestyle): Discussion, Individual Session, Demonstrates Understanding/Competency (verbalizes/demonstrates)  Medications (states correct name, dose, onset, peak, duration, side effects & timing of meds): Discussion, Individual Session, Demonstrates Understanding/Competency(verbalizes/demonstrates)  Monitoring (monitoring blood glucose/other parameters & using results): Discussion, Individual Session, Demonstrates Understanding/Competency (verbalizes/demonstrates), Written Materials Provided  Acute Complications (preventing, detecting, and treating acute complications): Discussion, Individual Session, Demonstrates Understanding/Competency (verbalizes/demonstrates)  Chronic Complications (preventing, detecting, and treating chronic  complications): Demonstrates Understanding/Competency (verbalizes/demonstrates)  Clinical (diabetes, other pertinent medical history, and relevant comorbidities reviewed during visit): Demonstrates Understanding/Competency (verbalizes/demonstrates)  Cognitive (knowledge of self-management skills, functional health literacy): Demonstrates Understanding/Competency (verbalizes/demonstrates)  Psychosocial (emotional response to diabetes): Not Covered/Deferred  Diabetes Distress and Support Systems: Not Covered/Deferred  Behavioral (readiness for change, lifestyle practices, self-care behaviors): Discussion, Individual Session, Demonstrates Understanding/Competency (verbalizes/demonstrates)    Goals  Patient has selected/evaluated goals during today's session: Yes, evaluated  Healthy Eating: Set(use meal plan - carb portions/spacing, low-sodium seasonings (custom written seasoning, food/meal/marco lists); avoid fast foods and sugary marco; MR shake 1-2 x/d for meal regularity and improve protein intake for wound healing.)  Met Percentage : 25%  Start Date: 12/18/19  Target Date: 01/16/20  Physical Activity: Set(150min/wk - walking prog)  Met Percentage : 0%  Start Date: 12/18/19  Target Date: 01/16/20  Monitoring: Set(test BG fst, acl, acd, bed; bring records to clinic; upload to [employer] weekly)  Met Percentage : 50%  Start Date: 12/18/19  Target Date: 01/16/20     Diabetes Care Plan/Intervention  Education Plan/Intervention: Individual Follow-Up DSMT, Endocrine Provider Visit Set Up(Maintain visits w/ Dr. Severino.  Pt agrees to upload BG logs to [employer] weekly; instructed her today on process to upload BG log attachment to [employer]. ) Due to overall hyperglycemic patterns and imbalance basal/bolus insulin, instructed to increase Toujeo 80units daily. Will collaborate w/ Dr. Severino on today and future insulin adjustments.      Diabetes Meal Plan  Restrictions: Low Fat, Low Sodium  Calories: 1400, 1500  Carbohydrate Per Meal:  30-45g  Carbohydrate Per Snack : 15-30g    Today's Self-Management Care Plan was developed with the patient's input and is based on barriers identified during today's assessment.    The long and short-term goals in the care plan were written with the patient/caregiver's input. The patient has agreed to work toward these goals to improve her overall diabetes control.      The patient received a copy of today's self-management plan and verbalized understanding of the care plan, goals, and all of today's instructions.      The patient was encouraged to communicate with her physician and care team regarding her condition(s) and treatment.  I provided the patient with my contact information today and encouraged her to contact me via phone or patient portal as needed.     Education Units of Time   Time Spent: 30 min

## 2019-12-18 NOTE — LETTER
December 18, 2019        DOMINIK Beach MD  [address]  [place] [address]             [facility] Diabetes [facility]  [address] [address]  Phone: [phone]  Fax: [phone]   Patient: Yolanda Betts   MR Number: [id number]   YOB: 1970   Date of Visit: 12/18/2019       Dear Dr. Beach:    Thank you for referring Yolanda Betts to me for evaluation. Below are the relevant portions of my assessment and plan of care.     If you have questions, please do not hesitate to call me. I look forward to following Yolanda along with you.    Sincerely,      Yara Sanchez RD, CDE           CC  Malka Severino MD

## 2019-12-18 NOTE — Clinical Note
Dr. Severino, Due to overall hyperglycemic patterns and imbalance basal/bolus insulin, instructed to increase Toujeo 80units daily. Please advise if you agree w/ adjustments or desire additional changes. Thanks, Yara

## 2019-12-19 DIAGNOSIS — M79.7 FIBROMYALGIA: Chronic | ICD-10-CM

## 2019-12-19 RX ORDER — GABAPENTIN 300 MG/1
600 CAPSULE ORAL 3 TIMES DAILY
Qty: 90 CAPSULE | Refills: 3 | Status: SHIPPED | OUTPATIENT
Start: 2019-12-19 | End: 2020-03-05 | Stop reason: SDUPTHER

## 2019-12-23 DIAGNOSIS — E26.9 HYPERALDOSTERONISM: ICD-10-CM

## 2019-12-23 DIAGNOSIS — I10 ESSENTIAL HYPERTENSION: Chronic | ICD-10-CM

## 2019-12-23 RX ORDER — SPIRONOLACTONE 25 MG/1
25 TABLET ORAL DAILY
Qty: 30 TABLET | Refills: 6 | Status: SHIPPED | OUTPATIENT
Start: 2019-12-23 | End: 2020-08-11 | Stop reason: SDUPTHER

## 2019-12-27 ENCOUNTER — PATIENT MESSAGE (OUTPATIENT)
Dept: INTERNAL MEDICINE | Facility: CLINIC | Age: 49
End: 2019-12-27

## 2019-12-27 ENCOUNTER — TELEPHONE (OUTPATIENT)
Dept: RHEUMATOLOGY | Facility: CLINIC | Age: 49
End: 2019-12-27

## 2019-12-27 ENCOUNTER — TELEPHONE (OUTPATIENT)
Dept: INTERNAL MEDICINE | Facility: CLINIC | Age: 49
End: 2019-12-27

## 2019-12-27 ENCOUNTER — TELEPHONE (OUTPATIENT)
Dept: SURGERY | Facility: CLINIC | Age: 49
End: 2019-12-27

## 2019-12-27 RX ORDER — PROMETHAZINE HYDROCHLORIDE 25 MG/1
25 TABLET ORAL EVERY 4 HOURS
Qty: 20 TABLET | Refills: 1 | Status: SHIPPED | OUTPATIENT
Start: 2019-12-27 | End: 2020-02-04

## 2019-12-27 NOTE — TELEPHONE ENCOUNTER
----- Message from Lakisha Sosa sent at 12/27/2019  2:33 PM CST -----  Contact: pt       Type:  Needs Medical Advice    Who Called:  Pt   Symptoms (please be specific):  In pain  How long has patient had these symptoms:  For a while   Pharmacy name and phone #:  [facility] - [phone]  Would the patient rather a call back or a response via [facility]? Call back  Best Call Back Number: [phone]  Additional Information:

## 2019-12-27 NOTE — PRE ADMISSION SCREENING
Pre op instructions reviewed with patient per phone:    To confirm, Your surgeon has instructed you:  Surgery is scheduled 01/03/20at 0700.      Please report to [facility] 1st floor main lobby by 0530.  Pre admit office to call afternoon prior to surgery with final arrival time.  If surgery is on Monday, Pre admit office to call Friday afternoon with with final arrival time changes      INSTRUCTIONS IMPORTANT!!!  ¨ No smoking after 12 midnight, the night before surgery.  ¨ No solid food after 12 midnight, but you may have clear liquids up until 3 hours prior to surgery.  This includes: grape, cranberry, and apple juice (not orange, and no coffee.)   ¨ OK to brush teeth, but no gum, candy or mints!    ¨ Take only these medicines with a small swallow of water-morning of surgery.  Topamax, Xanax, Amlodipine, Valtrex, Cymbalta, Gabapentin, Hydralazine      ____  Do not wear makeup, including mascara.  ____  No powder, lotions or creams to surgical area.  ____  Please remove all jewelry, including piercings and leave at home.  ____  No money or valuables needed. Please leave at home.  ____  Please bring identification and insurance information to hospital.  ____  If going home the same day, arrange for a ride home. You will not be able to   drive if Anesthesia was used.  ____  Children, under 12 years old, must remain in the waiting room with an adult.  They are not allowed in patient areas.  ____  Wear loose fitting clothing. Allow for dressings, bandages.  ____  Stop Aspirin, Ibuprofen, Motrin and Aleve at least 5-7 days before surgery, unless otherwise instructed by your doctor, or the nurse.   You MAY use Tylenol/acetaminophen until day of surgery.  ____  If you take diabetic medication, do not take am of surgery unless instructed by   Doctor.  ____ Stop taking any Fish Oil supplement or any Vitamins that contain Vitamin E at least 5 days prior to surgery.          Bathing Instructions-- The  night before surgery and the morning prior to coming to the hospital:   -Do not shave the surgical area.   -Shower and wash your hair and body as usual with your regular soap and shampoo.   -Rinse your hair and body completely.   -Use one packet of hibiclens to wash the surgical site (using your hand) gently for 5 minutes.  Do not scrub you skin too hard.   -Do not use hibiclens on your head, face, or genitals.   -Do not wash with regular soap after you use the hibiclens.   -Rinse your body thoroughly.   -Dry with clean, soft towel.  Do not use lotion, cream, deodorant, or powders on   the surgical site.    Use antibacterial soap in place of hibiclens if your surgery is on the head, face or genitals.         Surgical Site Infection    Prevention of surgical site infections:     -Keep incisions clean and dry.   -Do not soak/submerge incisions in water until completely healed.   -Do not apply lotions, powders, creams, or deodorants to site.   -Always make sure hands are cleaned with antibacterial soap/ alcohol-based   prior to touching the surgical site.  (This includes doctors, nurses, staff, and yourself.)    Signs and symptoms:   -Redness and pain around the area where you had surgery   -Drainage of cloudy fluid from your surgical wound   -Fever over 100.4  I have read or had read and explained to me, and understand the above information.

## 2019-12-27 NOTE — TELEPHONE ENCOUNTER
----- Message from Tamara Singletary sent at 12/27/2019 10:49 AM CST -----  Contact: pt   Type:  Needs Medical Advice    Who Called: pt   Symptoms (please be specific): back and joint point    How long has patient had these symptoms:  approx 3 mnths  Pharmacy name and phone #:    Would the patient rather a call back or a response via [facility]? phone  Best Call Back Number: [phone]  Additional Information:

## 2019-12-27 NOTE — TELEPHONE ENCOUNTER
Spoke to patient, she was advised per Dr. Driscoll to go to the ER for the severe abdominal pain,the patient was also advised that a refill for nausea was sent to her pharmacy electronically, patient voiced understanding.

## 2019-12-27 NOTE — TELEPHONE ENCOUNTER
Spoke with patient. She is having pain from her neck down to low back for 1 month . Scheduled with Ms Phillip 12-30

## 2019-12-27 NOTE — TELEPHONE ENCOUNTER
----- Message from Michelle Phillips sent at 12/27/2019 10:47 AM CST -----  Patient needs call back to consult with nurse rg pain and medication..[phone] (home)

## 2019-12-28 ENCOUNTER — HOSPITAL ENCOUNTER (EMERGENCY)
Facility: HOSPITAL | Age: 49
Discharge: HOME | End: 2019-12-28
Attending: EMERGENCY MEDICINE
Payer: MEDICAID

## 2019-12-28 VITALS
TEMPERATURE: 99 F | BODY MASS INDEX: 44.55 KG/M2 | RESPIRATION RATE: 20 BRPM | HEIGHT: 56 IN | OXYGEN SATURATION: 97 % | WEIGHT: 198.06 LBS | SYSTOLIC BLOOD PRESSURE: 123 MMHG | DIASTOLIC BLOOD PRESSURE: 75 MMHG | HEART RATE: 82 BPM

## 2019-12-28 DIAGNOSIS — R11.2 NON-INTRACTABLE VOMITING WITH NAUSEA, UNSPECIFIED VOMITING TYPE: Primary | ICD-10-CM

## 2019-12-28 DIAGNOSIS — R11.2 NAUSEA AND VOMITING, INTRACTABILITY OF VOMITING NOT SPECIFIED, UNSPECIFIED VOMITING TYPE: ICD-10-CM

## 2019-12-28 DIAGNOSIS — R10.11 RIGHT UPPER QUADRANT ABDOMINAL PAIN: ICD-10-CM

## 2019-12-28 LAB
ALBUMIN SERPL BCP-MCNC: 3.8 G/DL (ref 3.5–5.2)
ALP SERPL-CCNC: 82 U/L (ref 55–135)
ALT SERPL W/O P-5'-P-CCNC: 50 U/L (ref 10–44)
ANION GAP SERPL CALC-SCNC: 13 MMOL/L (ref 8–16)
AST SERPL-CCNC: 38 U/L (ref 10–40)
BASOPHILS # BLD AUTO: 0.05 K/UL (ref 0–0.2)
BASOPHILS NFR BLD: 0.5 % (ref 0–1.9)
BILIRUB SERPL-MCNC: 0.3 MG/DL (ref 0.1–1)
BILIRUB UR QL STRIP: NEGATIVE
BUN SERPL-MCNC: 12 MG/DL (ref 6–20)
CALCIUM SERPL-MCNC: 9.5 MG/DL (ref 8.7–10.5)
CHLORIDE SERPL-SCNC: 98 MMOL/L (ref 95–110)
CLARITY UR: CLEAR
CO2 SERPL-SCNC: 26 MMOL/L (ref 23–29)
COLOR UR: YELLOW
CREAT SERPL-MCNC: 0.8 MG/DL (ref 0.5–1.4)
DIFFERENTIAL METHOD: ABNORMAL
EOSINOPHIL # BLD AUTO: 0.1 K/UL (ref 0–0.5)
EOSINOPHIL NFR BLD: 1.4 % (ref 0–8)
ERYTHROCYTE [DISTWIDTH] IN BLOOD BY AUTOMATED COUNT: 14.3 % (ref 11.5–14.5)
EST. GFR  (AFRICAN AMERICAN): >60 ML/MIN/1.73 M^2
EST. GFR  (NON AFRICAN AMERICAN): >60 ML/MIN/1.73 M^2
GLUCOSE SERPL-MCNC: 136 MG/DL (ref 70–110)
GLUCOSE UR QL STRIP: NEGATIVE
HCT VFR BLD AUTO: 37 % (ref 37–48.5)
HGB BLD-MCNC: 11.6 G/DL (ref 12–16)
HGB UR QL STRIP: NEGATIVE
IMM GRANULOCYTES # BLD AUTO: 0.04 K/UL (ref 0–0.04)
IMM GRANULOCYTES NFR BLD AUTO: 0.4 % (ref 0–0.5)
KETONES UR QL STRIP: NEGATIVE
LEUKOCYTE ESTERASE UR QL STRIP: NEGATIVE
LIPASE SERPL-CCNC: 18 U/L (ref 4–60)
LYMPHOCYTES # BLD AUTO: 3.4 K/UL (ref 1–4.8)
LYMPHOCYTES NFR BLD: 34.7 % (ref 18–48)
MCH RBC QN AUTO: 27.5 PG (ref 27–31)
MCHC RBC AUTO-ENTMCNC: 31.4 G/DL (ref 32–36)
MCV RBC AUTO: 88 FL (ref 82–98)
MONOCYTES # BLD AUTO: 0.5 K/UL (ref 0.3–1)
MONOCYTES NFR BLD: 5 % (ref 4–15)
NEUTROPHILS # BLD AUTO: 5.7 K/UL (ref 1.8–7.7)
NEUTROPHILS NFR BLD: 58 % (ref 38–73)
NITRITE UR QL STRIP: NEGATIVE
NRBC BLD-RTO: 0 /100 WBC
PH UR STRIP: 5 [PH] (ref 5–8)
PLATELET # BLD AUTO: 350 K/UL (ref 150–350)
PMV BLD AUTO: 9.8 FL (ref 9.2–12.9)
POTASSIUM SERPL-SCNC: 4 MMOL/L (ref 3.5–5.1)
PROT SERPL-MCNC: 7.5 G/DL (ref 6–8.4)
PROT UR QL STRIP: NEGATIVE
RBC # BLD AUTO: 4.22 M/UL (ref 4–5.4)
SODIUM SERPL-SCNC: 137 MMOL/L (ref 136–145)
SP GR UR STRIP: 1.01 (ref 1–1.03)
URN SPEC COLLECT METH UR: NORMAL
UROBILINOGEN UR STRIP-ACNC: NEGATIVE EU/DL
WBC # BLD AUTO: 9.89 K/UL (ref 3.9–12.7)

## 2019-12-28 PROCEDURE — 25000003 PHARM REV CODE 250: Performed by: PHYSICIAN ASSISTANT

## 2019-12-28 PROCEDURE — 83690 ASSAY OF LIPASE: CPT

## 2019-12-28 PROCEDURE — 96361 HYDRATE IV INFUSION ADD-ON: CPT

## 2019-12-28 PROCEDURE — 99284 EMERGENCY DEPT VISIT MOD MDM: CPT | Mod: 25

## 2019-12-28 PROCEDURE — 85025 COMPLETE CBC W/AUTO DIFF WBC: CPT

## 2019-12-28 PROCEDURE — 80053 COMPREHEN METABOLIC PANEL: CPT

## 2019-12-28 PROCEDURE — 96365 THER/PROPH/DIAG IV INF INIT: CPT

## 2019-12-28 PROCEDURE — 81003 URINALYSIS AUTO W/O SCOPE: CPT

## 2019-12-28 PROCEDURE — 63600175 PHARM REV CODE 636 W HCPCS: Performed by: PHYSICIAN ASSISTANT

## 2019-12-28 PROCEDURE — 96375 TX/PRO/DX INJ NEW DRUG ADDON: CPT

## 2019-12-28 PROCEDURE — 36415 COLL VENOUS BLD VENIPUNCTURE: CPT

## 2019-12-28 RX ORDER — OXYCODONE AND ACETAMINOPHEN 7.5; 325 MG/1; MG/1
1 TABLET ORAL ONCE
Status: COMPLETED | OUTPATIENT
Start: 2019-12-28 | End: 2019-12-28

## 2019-12-28 RX ORDER — ONDANSETRON 2 MG/ML
4 INJECTION INTRAMUSCULAR; INTRAVENOUS
Status: COMPLETED | OUTPATIENT
Start: 2019-12-28 | End: 2019-12-28

## 2019-12-28 RX ORDER — OXYCODONE AND ACETAMINOPHEN 5; 325 MG/1; MG/1
1 TABLET ORAL EVERY 8 HOURS PRN
Qty: 10 TABLET | Refills: 0 | Status: SHIPPED | OUTPATIENT
Start: 2019-12-28 | End: 2020-02-04

## 2019-12-28 RX ORDER — PROMETHAZINE HYDROCHLORIDE 25 MG/1
25 TABLET ORAL EVERY 8 HOURS PRN
Qty: 14 TABLET | Refills: 0 | Status: SHIPPED | OUTPATIENT
Start: 2019-12-28 | End: 2019-12-30

## 2019-12-28 RX ADMIN — ONDANSETRON 4 MG: 2 INJECTION INTRAMUSCULAR; INTRAVENOUS at 03:12

## 2019-12-28 RX ADMIN — SODIUM CHLORIDE 1000 ML: 0.9 INJECTION, SOLUTION INTRAVENOUS at 03:12

## 2019-12-28 RX ADMIN — PROMETHAZINE HYDROCHLORIDE 12.5 MG: 25 INJECTION INTRAMUSCULAR; INTRAVENOUS at 05:12

## 2019-12-28 RX ADMIN — OXYCODONE AND ACETAMINOPHEN 1 TABLET: 7.5; 325 TABLET ORAL at 05:12

## 2019-12-29 ENCOUNTER — HOSPITAL ENCOUNTER (EMERGENCY)
Facility: HOSPITAL | Age: 49
Discharge: HOME | End: 2019-12-29
Attending: EMERGENCY MEDICINE
Payer: MEDICAID

## 2019-12-29 ENCOUNTER — NURSE TRIAGE (OUTPATIENT)
Dept: ADMINISTRATIVE | Facility: CLINIC | Age: 49
End: 2019-12-29

## 2019-12-29 VITALS
HEIGHT: 56 IN | TEMPERATURE: 99 F | SYSTOLIC BLOOD PRESSURE: 108 MMHG | HEART RATE: 84 BPM | OXYGEN SATURATION: 92 % | RESPIRATION RATE: 13 BRPM | BODY MASS INDEX: 44.74 KG/M2 | WEIGHT: 198.88 LBS | DIASTOLIC BLOOD PRESSURE: 59 MMHG

## 2019-12-29 DIAGNOSIS — G43.909 MIGRAINE WITHOUT STATUS MIGRAINOSUS, NOT INTRACTABLE, UNSPECIFIED MIGRAINE TYPE: Primary | ICD-10-CM

## 2019-12-29 LAB — POCT GLUCOSE: 105 MG/DL (ref 70–110)

## 2019-12-29 PROCEDURE — 99284 EMERGENCY DEPT VISIT MOD MDM: CPT | Mod: 25

## 2019-12-29 PROCEDURE — 96372 THER/PROPH/DIAG INJ SC/IM: CPT

## 2019-12-29 PROCEDURE — 82962 GLUCOSE BLOOD TEST: CPT

## 2019-12-29 PROCEDURE — 63600175 PHARM REV CODE 636 W HCPCS: Performed by: EMERGENCY MEDICINE

## 2019-12-29 RX ORDER — DIPHENHYDRAMINE HYDROCHLORIDE 50 MG/ML
50 INJECTION INTRAMUSCULAR; INTRAVENOUS
Status: COMPLETED | OUTPATIENT
Start: 2019-12-29 | End: 2019-12-29

## 2019-12-29 RX ORDER — METOCLOPRAMIDE HYDROCHLORIDE 5 MG/ML
10 INJECTION INTRAMUSCULAR; INTRAVENOUS
Status: COMPLETED | OUTPATIENT
Start: 2019-12-29 | End: 2019-12-29

## 2019-12-29 RX ORDER — KETOROLAC TROMETHAMINE 30 MG/ML
60 INJECTION, SOLUTION INTRAMUSCULAR; INTRAVENOUS
Status: COMPLETED | OUTPATIENT
Start: 2019-12-29 | End: 2019-12-29

## 2019-12-29 RX ADMIN — METOCLOPRAMIDE 10 MG: 5 INJECTION, SOLUTION INTRAMUSCULAR; INTRAVENOUS at 04:12

## 2019-12-29 RX ADMIN — DIPHENHYDRAMINE HYDROCHLORIDE 50 MG: 50 INJECTION, SOLUTION INTRAMUSCULAR; INTRAVENOUS at 04:12

## 2019-12-29 RX ADMIN — KETOROLAC TROMETHAMINE 60 MG: 30 INJECTION, SOLUTION INTRAMUSCULAR at 04:12

## 2019-12-29 NOTE — TELEPHONE ENCOUNTER
Reason for Disposition   Unable to walk, or can only walk with assistance (e.g., requires support)    Additional Information   Negative: Shock suspected (e.g., cold/pale/clammy skin, too weak to stand, low BP, rapid pulse)   Negative: Sounds like a life-threatening emergency to the triager   Negative: [1] Nausea or vomiting AND [2] pregnancy < 20 weeks   Negative: Menstrual Period - Missed or Late (i.e., pregnancy suspected)   Negative: Heat exhaustion suspected (i.e., dehydration from heat exposure)   Negative: Motion sickness suspected (i.e., nausea with car, plane, boat, or train travel)   Negative: Anxiety or stress suspected (i.e., nausea with anxiety attacks or stressful situations)   Negative: Traumatic Brain Injury (TBI) suspected   Negative: Nausea (or Vomiting) in a cancer patient who is currently (or recently) receiving chemotherapy or radiation therapy, or cancer patient who has metastatic or end-stage cancer and is receiving palliative care   Negative: Vomiting occurs   Negative: Other symptom is present, see that guideline.  (e.g., chest pain, headache, dizziness, abdominal pain, colds, sore throat, etc.).    Protocols used: NAUSEA-A-AH  Seen in ER for IVF. Supposed to have gallbladder surg 1/3. Pt states she is not eating, no appetite. hx DM. trying to drink. Had 8 oz water so far today. having nausea. No vomiting. on pain pills. last uop= when woke at 10am, +dizzy. +dry mouth, BS= not checked. rec ED. Pt states she doesn't have transportation now. rec EMS. Pt states she will wait on her daughter to come [place] form the mall. Call back with questions.

## 2019-12-29 NOTE — ED PROVIDER NOTES
History      Chief Complaint   Patient presents with    Nausea     x 2-3 weeks, reports she is supposed to have gallbladder sx on Jan 3rd, reports decreased appetite, also states she has been having diarrhea        Review of patient's allergies indicates:   Allergen Reactions    Codeine Itching    Neuromuscular blockers, steroidal Hives     some    Latex, natural rubber Rash    Morphine Rash     itching    Norco [hydrocodone-acetaminophen] Itching, Rash and Hallucinations    Seconal [secobarbital sodium] Rash     itching    Tylox [oxycodone-acetaminophen] Rash        HPI   HPI    12/29/2019, 8:22 AM   History obtained from the patient      History of Present Illness: Yolanda Betts is a 49 y.o. female patient who presents to the Emergency Department for RUQ pain, vomiting and loose stool.  Scheduled for cholecystectomy 1/3.   Denies fever, dysuria.  Symptoms are moderate in severity.     No further complaints or concerns at this time.           PCP: CHARLA Beach MD       Past Medical History:  Past Medical History:   Diagnosis Date    Abnormal Pap smear of cervix     HPV genital warts    Anemia     Anxiety     Arthritis     Asthma 10/11/2016    Bipolar 1 disorder     Diabetes mellitus, type 2     Dyslipidemia associated with type 2 diabetes mellitus 5/13/2019    Genital warts     GERD (gastroesophageal reflux disease)     Herpes simplex virus (HSV) infection     Hypertension     Hypertension complicating diabetes 5/5/2019    Migraine with aura and without status migrainosus, not intractable 3/21/2016    Mild persistent asthma without complication 10/11/2016    Morbid obesity with body mass index (BMI) of 45.0 to 49.9 in adult 8/3/2017    Obstructive sleep apnea     ALEN (obstructive sleep apnea)     2L per N/C q HS    Schizoaffective disorder, bipolar type 4/25/2019    Seasonal allergic rhinitis due to pollen 5/13/2019    Type 2 diabetes mellitus with hyperglycemia, with  "long-term current use of insulin 2017    Type 2 diabetes mellitus with hyperglycemia, without long-term current use of insulin 2017         Past Surgical History:  Past Surgical History:   Procedure Laterality Date    abcess removed right back      BELT ABDOMINOPLASTY      BREAST SURGERY Bilateral 1996    Reduction     SECTION      X 2    COLONOSCOPY      COLONOSCOPY N/A 2018    Procedure: colonoscopy for iron deficiency anemia;  Surgeon: Frankie Sanchez MD;  Location: [facility] ENDO;  Service: Endoscopy;  Laterality: N/A;    COLONOSCOPY N/A 2018    Procedure: COLONOSCOPY;  Surgeon: Frankie Sanchez MD;  Location: [facility] ENDO;  Service: Endoscopy;  Laterality: N/A;    HYSTERECTOMY      w/ BSO; hypermenorrhea    MOUTH SURGERY      OOPHORECTOMY      hyst/bso, hypermenorrhea    TUBAL LIGATION             Family History:  Family History   Problem Relation Age of Onset    Diabetes Mother     Hyperlipidemia Mother     Hypertension Mother     Asthma Mother     COPD Mother     Glaucoma Mother     Thyroid disease Mother     Anesthesia problems Mother         "almost had a cardiac arrest" , blood clots    Hypertension Father     Hyperlipidemia Father     Cancer Father         Brain, lung, liver, kidney    Heart disease Maternal Grandmother     Hyperlipidemia Maternal Grandmother     Hypertension Maternal Grandmother     Cataracts Maternal Grandmother     Diabetes Maternal Grandmother     Heart disease Maternal Grandfather     Hyperlipidemia Maternal Grandfather     Hypertension Maternal Grandfather     Glaucoma Maternal Grandfather     Cancer Maternal Grandfather     Cataracts Maternal Grandfather     Macular degeneration Maternal Grandfather     Diabetes Maternal Grandfather     Heart disease Paternal Grandmother     Hyperlipidemia Paternal Grandmother     Hypertension Paternal Grandmother     Cataracts Paternal Grandmother     Heart disease " Paternal Grandfather     Hyperlipidemia Paternal Grandfather     Hypertension Paternal Grandfather     Cataracts Paternal Grandfather     Breast cancer Maternal Cousin     Breast cancer Maternal Cousin     Breast cancer Maternal Cousin     Breast cancer Maternal Cousin            Social History:  Social History     Tobacco Use    Smoking status: Never Smoker    Smokeless tobacco: Never Used   Substance and Sexual Activity    Alcohol use: Yes     Alcohol/week: 0.0 standard drinks     Comment: socially  No alcohol 72h prior to sx    Drug use: No    Sexual activity: Not Currently     Partners: Male       ROS     Review of Systems   Constitutional: Negative for chills and fever.   HENT: Negative for facial swelling and trouble swallowing.    Eyes: Negative for discharge, redness and visual disturbance.   Respiratory: Negative for chest tightness and shortness of breath.    Cardiovascular: Negative for chest pain and leg swelling.   Gastrointestinal: Positive for abdominal pain, nausea and vomiting.   Genitourinary: Negative for decreased urine volume and dysuria.   Musculoskeletal: Negative for joint swelling and neck stiffness.   Skin: Negative for rash and wound.   Neurological: Negative for syncope and facial asymmetry.   All other systems reviewed and are negative.      Physical Exam      Initial Vitals [12/28/19 1502]   BP Pulse Resp Temp SpO2   (!) 120/56 95 18 98.6 °F (37 °C) 96 %      MAP       --         Physical Exam  Vital signs and nursing notes reviewed.  Constitutional: Patient is in NAD. Awake and alert. Well-developed and well-nourished.  Head: Atraumatic. Normocephalic.  Eyes: PERRL. EOM intact. Conjunctivae nl. No scleral icterus.  ENT: Mucous membranes are moist. Oropharynx is clear.  Neck: Supple. No JVD. No lymphadenopathy.  No meningismus  Cardiovascular: Regular rate and rhythm. No murmurs, rubs, or gallops. Distal pulses are 2+ and symmetric.  Pulmonary/Chest: No respiratory distress.  "Clear to auscultation bilaterally. No wheezing, rales, or rhonchi.  Abdominal: Soft. Non-distended. Mild ruq TTP. No rebound, guarding, or rigidity. Good bowel sounds.  Genitourinary: No CVA tenderness  Musculoskeletal: Moves all extremities. No edema.   Skin: Warm and dry.  Neurological: Awake and alert. No acute focal neurological deficits are appreciated.  Psychiatric: Normal affect. Good eye contact. Appropriate in content.      ED Course          Procedures  ED Vital Signs:  Vitals:    12/28/19 1502 12/28/19 1737   BP: (!) 120/56 123/75   Pulse: 95 82   Resp: 18 20   Temp: 98.6 °F (37 °C)    TempSrc: Oral    SpO2: 96% 97%   Weight: 89.8 kg (198 lb 1.3 oz)    Height: 4' 8" (1.422 m)          Results for orders placed or performed during the hospital encounter of 12/28/19   CBC auto differential   Result Value Ref Range    WBC 9.89 3.90 - 12.70 K/uL    RBC 4.22 4.00 - 5.40 M/uL    Hemoglobin 11.6 (L) 12.0 - 16.0 g/dL    Hematocrit 37.0 37.0 - 48.5 %    Mean Corpuscular Volume 88 82 - 98 fL    Mean Corpuscular Hemoglobin 27.5 27.0 - 31.0 pg    Mean Corpuscular Hemoglobin Conc 31.4 (L) 32.0 - 36.0 g/dL    RDW 14.3 11.5 - 14.5 %    Platelets 350 150 - 350 K/uL    MPV 9.8 9.2 - 12.9 fL    Immature Granulocytes 0.4 0.0 - 0.5 %    Gran # (ANC) 5.7 1.8 - 7.7 K/uL    Immature Grans (Abs) 0.04 0.00 - 0.04 K/uL    Lymph # 3.4 1.0 - 4.8 K/uL    Mono # 0.5 0.3 - 1.0 K/uL    Eos # 0.1 0.0 - 0.5 K/uL    Baso # 0.05 0.00 - 0.20 K/uL    nRBC 0 0 /100 WBC    Gran% 58.0 38.0 - 73.0 %    Lymph% 34.7 18.0 - 48.0 %    Mono% 5.0 4.0 - 15.0 %    Eosinophil% 1.4 0.0 - 8.0 %    Basophil% 0.5 0.0 - 1.9 %    Differential Method Automated    Comprehensive metabolic panel   Result Value Ref Range    Sodium 137 136 - 145 mmol/L    Potassium 4.0 3.5 - 5.1 mmol/L    Chloride 98 95 - 110 mmol/L    CO2 26 23 - 29 mmol/L    Glucose 136 (H) 70 - 110 mg/dL    BUN, Bld 12 6 - 20 mg/dL    Creatinine 0.8 0.5 - 1.4 mg/dL    Calcium 9.5 8.7 - 10.5 mg/dL "    Total Protein 7.5 6.0 - 8.4 g/dL    Albumin 3.8 3.5 - 5.2 g/dL    Total Bilirubin 0.3 0.1 - 1.0 mg/dL    Alkaline Phosphatase 82 55 - 135 U/L    AST 38 10 - 40 U/L    ALT 50 (H) 10 - 44 U/L    Anion Gap 13 8 - 16 mmol/L    eGFR if African American >60 >60 mL/min/1.73 m^2    eGFR if non African American >60 >60 mL/min/1.73 m^2   Lipase   Result Value Ref Range    Lipase 18 4 - 60 U/L   Urinalysis, Reflex to Urine Culture Urine, Clean Catch   Result Value Ref Range    Specimen UA Urine, Clean Catch     Color, UA Yellow Yellow, Straw, Milagros    Appearance, UA Clear Clear    pH, UA 5.0 5.0 - 8.0    Specific Gravity, UA 1.010 1.005 - 1.030    Protein, UA Negative Negative    Glucose, UA Negative Negative    Ketones, UA Negative Negative    Bilirubin (UA) Negative Negative    Occult Blood UA Negative Negative    Nitrite, UA Negative Negative    Urobilinogen, UA Negative <2.0 EU/dL    Leukocytes, UA Negative Negative             Imaging Results:  Imaging Results    None            The Emergency Provider reviewed the vital signs and test results, which are outlined above.    ED Discussion             Medication(s) given in the ER:  Medications   sodium chloride 0.9% bolus 1,000 mL (0 mLs Intravenous Stopped 12/28/19 1738)   ondansetron injection 4 mg (4 mg Intravenous Given 12/28/19 1541)   promethazine (PHENERGAN) 12.5 mg in dextrose 5 % 50 mL IVPB (0 mg Intravenous Stopped 12/28/19 1738)   oxyCODONE-acetaminophen 7.5-325 mg per tablet 1 tablet (1 tablet Oral Given 12/28/19 1706)           Follow-up Information     CHARLA Beach MD In 2 days.    Specialty:  Family Medicine  Contact information:  [address] [place] [address] [phone]                          Medication List      START taking these medications    oxyCODONE-acetaminophen 5-325 mg per tablet  Commonly known as:  PERCOCET  Take 1 tablet by mouth every 8 (eight) hours as needed for Pain.        CONTINUE taking these medications    *  "promethazine 25 MG tablet  Commonly known as:  PHENERGAN  Take 1 tablet (25 mg total) by mouth every 4 (four) hours.     * promethazine 25 MG tablet  Commonly known as:  PHENERGAN  Take 1 tablet (25 mg total) by mouth every 8 (eight) hours as needed for Nausea. sedating         * This list has 2 medication(s) that are the same as other medications prescribed for you. Read the directions carefully, and ask your doctor or other care provider to review them with you.            ASK your doctor about these medications    ALPRAZolam 2 MG Tab  Commonly known as:  XANAX  Take 1 tablet by mouth twice daily     amLODIPine 10 MG tablet  Commonly known as:  NORVASC     ARIPiprazole 10 MG Tab  Commonly known as:  ABILIFY  Take 1 tablet by mouth every day     aspirin 81 MG EC tablet  Commonly known as:  ECOTRIN     atorvastatin 20 MG tablet  Commonly known as:  LIPITOR  Take 1 tablet (20 mg total) by mouth every evening.     BD Insulin Syringe Ultra-Fine 0.5 mL 30 gauge x 1/2" Syrg  Generic drug:  insulin syringe-needle U-100     BD Ultra-Fine Mini Pen Needle 31 gauge x 3/16" Ndle  Generic drug:  pen needle, diabetic  Use 5 a day     benztropine 1 MG tablet  Commonly known as:  COGENTIN  Take 1 tablet by mouth at bedtime     cloNIDine 0.1 MG tablet  Commonly known as:  CATAPRES  Take 1 tablet (0.1 mg total) by mouth every 6 (six) hours as needed (for SBP>150 or DBP>100 mmHg).     DAILY MULTIPLE FOR WOMEN ORAL     DULoxetine 60 MG capsule  Commonly known as:  CYMBALTA  Take 1 capsule by mouth twice daily     ergocalciferol 50,000 unit Cap  Commonly known as:  ERGOCALCIFEROL  Take 1 capsule (50,000 Units total) by mouth every 14 (fourteen) days.     fish oil-omega-3 fatty acids 300-1,000 mg capsule     fluticasone propionate 50 mcg/actuation nasal spray  Commonly known as:  FLONASE  2 sprays (100 mcg total) by Each Nare route once daily.     frovatriptan 2.5 MG tablet  Commonly known as:  FROVA  Take one tablet orally. If recurs, " may repeat after 2 hours. Max of 3 tabs in 24 hours.     furosemide 20 MG tablet  Commonly known as:  LASIX  Take 1 tablet (20 mg total) by mouth once daily.     gabapentin 300 MG capsule  Commonly known as:  NEURONTIN  Take 2 capsules (600 mg total) by mouth 3 (three) times daily.     hydrALAZINE 25 MG tablet  Commonly known as:  APRESOLINE  Take 1 tablet (25 mg total) by mouth every 12 (twelve) hours.     insulin aspart U-100 100 unit/mL (3 mL) Inpn pen  Commonly known as:  NovoLOG Flexpen U-100 Insulin  Inject before meals three times a day up to 30 units daily     Linzess 145 mcg Cap capsule  Generic drug:  linaCLOtide     lurasidone 120 mg Tab  Commonly known as:  Latuda  Take 1 tablet by mouth at 5 pm with food     magnesium oxide 400 mg (241.3 mg magnesium) tablet  Commonly known as:  MAG-OX  Take 1 tablet (400 mg total) by mouth once daily.     meclizine 25 mg tablet  Commonly known as:  ANTIVERT  Take 1 tablet (25 mg total) by mouth 3 (three) times daily as needed.     metFORMIN 1000 MG tablet  Commonly known as:  GLUCOPHAGE  TAKE 1 TABLET (1,000 MG TOTAL) BY MOUTH 2 (TWO) TIMES DAILY.     metoprolol succinate 100 MG 24 hr tablet  Commonly known as:  TOPROL-XL  Take 2 tablets (200 mg total) by mouth every evening.     mirtazapine 45 MG tablet  Commonly known as:  REMERON  Take 1 tablet by mouth at bedtime     mometasone 220 mcg/ actuation (120) Aepb  Commonly known as:  Asmanex Twisthaler  Inhale 2 puffs into the lungs 2 (two) times daily. Controller     mupirocin 2 % ointment  Commonly known as:  BACTROBAN  Apply topically 3 (three) times daily.     Restasis 0.05 % ophthalmic emulsion  Generic drug:  cycloSPORINE  Place 0.4 mLs (1 drop total) into both eyes 2 (two) times daily.     spironolactone 25 MG tablet  Commonly known as:  ALDACTONE  Take 1 tablet (25 mg total) by mouth once daily.     topiramate 50 MG tablet  Commonly known as:  TOPAMAX  Take 1 tablet (50 mg total) by mouth 2 (two) times daily.      Toujeo SoloStar U-300 Insulin 300 unit/mL (1.5 mL) Inpn pen  Generic drug:  insulin glargine (TOUJEO)  Inject 75 units daily     triazolam 0.25 MG Tab  Commonly known as:  HALCION  Take 1 tablet by mouth at bedtime     True Metrix Glucose Meter Misc  Generic drug:  blood-glucose meter  Use to check blood sugars, give meter based on insurance specification     True Metrix Glucose Test Strip Strp  Generic drug:  blood sugar diagnostic  Check blood glucose 4 times daily as directed and as needed (dispense insurance preferred brand or patient choice)     TRUEplus Lancets 28 gauge Misc  Generic drug:  lancets  use as directed 3 times daily     valACYclovir 1000 MG tablet  Commonly known as:  VALTREX  Take 1 tablet (1,000 mg total) by mouth once daily.     valsartan 320 MG tablet  Commonly known as:  DIOVAN  Take 1 tablet (320 mg total) by mouth once daily.     Victoza 3-Nitin 0.6 mg/0.1 mL (18 mg/3 mL) Pnij  Generic drug:  liraglutide 0.6 mg/0.1 mL (18 mg/3 mL) subq PNIJ  Use 1.8mg under the skin daily           Where to Get Your Medications      You can get these medications from any pharmacy    Bring a paper prescription for each of these medications  · oxyCODONE-acetaminophen 5-325 mg per tablet  · promethazine 25 MG tablet             Medical Decision Making      Patient tolerating PO challenge well.  Repeat abd exam:  Still nontender, no rebound.  Normal bowel sounds x 4.      All findings were reviewed with the patient/family in detail.   All remaining questions and concerns were addressed at that time.  Patient/family has been counseled regarding the need for follow-up as well as the indication to return to the emergency room should new or worrisome developments occur.        MDM               Clinical Impression:        ICD-10-CM ICD-9-CM   1. Non-intractable vomiting with nausea, unspecified vomiting type R11.2 787.01   2. Nausea and vomiting, intractability of vomiting not specified, unspecified vomiting type  R11.2 787.01   3. Right upper quadrant abdominal pain R10.11 789.01               Mariam Bearden PA-C  12/29/19 0842

## 2019-12-29 NOTE — ED PROVIDER NOTES
SCRIBE #1 NOTE: I, Melinda Tyson, am scribing for, and in the presence of, Gregory Begum Jr., MD. I have scribed the entire note.      History      Chief Complaint   Patient presents with    Migraine     pt c/o migraine HA, nausea, dizziness, x1 week       Review of patient's allergies indicates:   Allergen Reactions    Codeine Itching    Neuromuscular blockers, steroidal Hives     some    Latex, natural rubber Rash    Morphine Rash     itching    Norco [hydrocodone-acetaminophen] Itching, Rash and Hallucinations    Seconal [secobarbital sodium] Rash     itching    Tylox [oxycodone-acetaminophen] Rash        HPI   HPI    12/29/2019, 3:53 PM   History obtained from the patient      History of Present Illness: Yolanda Betts is a 49 y.o. female patient with a hx of Migraines who presents to the Emergency Department for evaluation of a HA which onset gradually x1 week ago. Symptoms are constant and moderate in severity. No mitigating or exacerbating factors reported. Associated sxs include dizziness and nausea. Patient is also c/o abdominal pain mentioning that she is scheduled to have her gallbladder removed on 1/03. Patient denies any fever, chills, neck pain/stiffness, visual disturbance/photophobia, ear pain, and all other sxs at this time. Patient reports being treated in the past with Frova for her sxs which had a positive response. No further complaints or concerns at this time.       Arrival mode: Personal vehicle     PCP: CHARLA Beach MD       Past Medical History:  Past Medical History:   Diagnosis Date    Abnormal Pap smear of cervix     HPV genital warts    Anemia     Anxiety     Arthritis     Asthma 10/11/2016    Bipolar 1 disorder     Diabetes mellitus, type 2     Dyslipidemia associated with type 2 diabetes mellitus 5/13/2019    Genital warts     GERD (gastroesophageal reflux disease)     Herpes simplex virus (HSV) infection     Hypertension     Hypertension complicating  "diabetes 2019    Migraine with aura and without status migrainosus, not intractable 3/21/2016    Mild persistent asthma without complication 10/11/2016    Morbid obesity with body mass index (BMI) of 45.0 to 49.9 in adult 8/3/2017    Obstructive sleep apnea     ALEN (obstructive sleep apnea)     2L per N/C q HS    Schizoaffective disorder, bipolar type 2019    Seasonal allergic rhinitis due to pollen 2019    Type 2 diabetes mellitus with hyperglycemia, with long-term current use of insulin 2017    Type 2 diabetes mellitus with hyperglycemia, without long-term current use of insulin 2017       Past Surgical History:  Past Surgical History:   Procedure Laterality Date    abcess removed right back      BELT ABDOMINOPLASTY      BREAST SURGERY Bilateral 1996    Reduction     SECTION      X 2    COLONOSCOPY      COLONOSCOPY N/A 2018    Procedure: colonoscopy for iron deficiency anemia;  Surgeon: Frankie Sanchez MD;  Location: [facility] ENDO;  Service: Endoscopy;  Laterality: N/A;    COLONOSCOPY N/A 2018    Procedure: COLONOSCOPY;  Surgeon: Frankie Sanchez MD;  [facility];  Service: Endoscopy;  Laterality: N/A;    HYSTERECTOMY      w/ BSO; hypermenorrhea    MOUTH SURGERY      OOPHORECTOMY      hyst/bso, hypermenorrhea    TUBAL LIGATION           Family History:  Family History   Problem Relation Age of Onset    Diabetes Mother     Hyperlipidemia Mother     Hypertension Mother     Asthma Mother     COPD Mother     Glaucoma Mother     Thyroid disease Mother     Anesthesia problems Mother         "almost had a cardiac arrest" , blood clots    Hypertension Father     Hyperlipidemia Father     Cancer Father         Brain, lung, liver, kidney    Heart disease Maternal Grandmother     Hyperlipidemia Maternal Grandmother     Hypertension Maternal Grandmother     Cataracts Maternal Grandmother     Diabetes Maternal Grandmother     " Heart disease Maternal Grandfather     Hyperlipidemia Maternal Grandfather     Hypertension Maternal Grandfather     Glaucoma Maternal Grandfather     Cancer Maternal Grandfather     Cataracts Maternal Grandfather     Macular degeneration Maternal Grandfather     Diabetes Maternal Grandfather     Heart disease Paternal Grandmother     Hyperlipidemia Paternal Grandmother     Hypertension Paternal Grandmother     Cataracts Paternal Grandmother     Heart disease Paternal Grandfather     Hyperlipidemia Paternal Grandfather     Hypertension Paternal Grandfather     Cataracts Paternal Grandfather     Breast cancer Maternal Cousin     Breast cancer Maternal Cousin     Breast cancer Maternal Cousin     Breast cancer Maternal Cousin        Social History:  Social History     Tobacco Use    Smoking status: Never Smoker    Smokeless tobacco: Never Used   Substance and Sexual Activity    Alcohol use: Yes     Alcohol/week: 0.0 standard drinks     Comment: socially  No alcohol 72h prior to sx    Drug use: No    Sexual activity: Not Currently     Partners: Male       ROS   Review of Systems   Constitutional: Negative for fever.   HENT: Negative for ear pain and sore throat.    Eyes: Negative for photophobia and visual disturbance.   Respiratory: Negative for shortness of breath.    Cardiovascular: Negative for chest pain.   Gastrointestinal: Positive for abdominal pain and nausea.   Genitourinary: Negative for dysuria.   Musculoskeletal: Negative for back pain, neck pain and neck stiffness.   Skin: Negative for rash.   Neurological: Positive for dizziness and headaches. Negative for weakness.   Hematological: Does not bruise/bleed easily.   All other systems reviewed and are negative.    Physical Exam      Initial Vitals [12/29/19 1538]   BP Pulse Resp Temp SpO2   105/61 95 18 99.2 °F (37.3 °C) 95 %      MAP       --          Physical Exam  Nursing Notes and Vital Signs Reviewed.  Constitutional: Patient is  "in no acute distress. Well-developed and well-nourished.  Head: Atraumatic. Normocephalic.  Eyes: PERRL. EOM intact. Conjunctivae are not pale. No scleral icterus.  ENT: Mucous membranes are moist. Nares clear.  Neck: Supple. Full ROM.   Cardiovascular: Regular rate. Regular rhythm. No murmurs, rubs, or gallops. Distal pulses are 2+ and symmetric.  Pulmonary/Chest: No respiratory distress. Clear to auscultation bilaterally. No wheezing or rales.  Abdominal: Soft and non-distended.  There is no tenderness.  Genitourinary: No CVA tenderness .  No suprapubic tenderness  Musculoskeletal: Moves all extremities. No obvious deformities. No edema.  Skin: Warm and dry.  Neurological:  Alert, awake, and appropriate.  Normal speech.  No acute focal neurological deficits are appreciated.  No nuchal rigidity or meningismus.  No focal lateralizing signs.  Psychiatric: Normal affect. Good eye contact. Appropriate in content.    ED Course    Procedures  ED Vital Signs:  Vitals:    12/29/19 1538 12/29/19 1555   BP: 105/61 (!) 114/57   Pulse: 95 90   Resp: 18 14   Temp: 99.2 °F (37.3 °C)    TempSrc: Oral    SpO2: 95% (!) 91%   Weight: 90.2 kg (198 lb 13.7 oz)    Height: 4' 8" (1.422 m)        Abnormal Lab Results:  Labs Reviewed - No data to display     All Lab Results:  None    Imaging Results:  Imaging Results    None                 The Emergency Provider reviewed the vital signs and test results, which are outlined above.    ED Discussion     4:23 PM: Reassessed pt at this time.  Pt states her condition has improved at this time. Discussed with pt all pertinent ED information and results. Discussed pt dx and plan of tx. Gave pt all f/u and return to the ED instructions. All questions and concerns were addressed at this time. Pt expresses understanding of information and instructions, and is comfortable with plan to discharge. Pt is stable for discharge.    I discussed with patient and/or family/caretaker that evaluation in the ED " does not suggest any emergent or life threatening medical conditions requiring immediate intervention beyond what was provided in the ED, and I believe patient is safe for discharge.  Regardless, an unremarkable evaluation in the ED does not preclude the development or presence of a serious of life threatening condition. As such, patient was instructed to return immediately for any worsening or change in current symptoms.      ED Medication(s):  Medications   ketorolac injection 60 mg (60 mg Intramuscular Given 12/29/19 1612)   metoclopramide HCl injection 10 mg (10 mg Intramuscular Given 12/29/19 1612)   diphenhydrAMINE injection 50 mg (50 mg Intramuscular Given 12/29/19 1611)     Current Discharge Medication List          Follow-up Information     L Ady Beach MD In 2 days.    Specialty:  Family Medicine  Contact information:  [id number]  [place] [address] [phone]                     Medical Decision Making              Scribe Attestation:   Scribe #1: I performed the above scribed service and the documentation accurately describes the services I performed. I attest to the accuracy of the note.    Attending:   Physician Attestation Statement for Scribe #1: I, Gregory Begum Jr., MD, personally performed the services described in this documentation, as scribed by Melinda Tyson, in my presence, and it is both accurate and complete.          Clinical Impression       ICD-10-CM ICD-9-CM   1. Migraine without status migrainosus, not intractable, unspecified migraine type G43.909 346.90       Disposition:   Disposition: Discharged  Condition: Stable         Gregory Begum Jr., MD  12/29/19 2818

## 2019-12-29 NOTE — ED NOTES
Patient identifiers verified and correct for Yolanda Betts.    LOC: The patient is awake, alert and aware of environment with an appropriate affect, the patient is oriented x 3 and speaking appropriately.  APPEARANCE: Patient resting comfortably and in no acute distress, patient is clean and well groomed, patient's clothing is properly fastened.  SKIN: The skin is warm and dry, color consistent with ethnicity, patient has normal skin turgor and moist mucus membranes, skin intact, no breakdown or bruising noted.  MUSCULOSKELETAL: Patient moving all extremities spontaneously, no obvious swelling or deformities noted.  RESPIRATORY: Airway is open and patent, respirations are spontaneous, patient has a normal effort and rate, no accessory muscle use noted, bilateral breath sounds clear.  CARDIAC: Patient has a normal rate and regular rhythm, no periphreal edema noted, capillary refill < 3 seconds.  ABDOMEN: Soft and non tender to palpation, no distention noted, normoactive bowel sounds present in all four quadrants. Pt reports abd pain and nausea  NEUROLOGIC: PERRL, **mm bilaterally, eyes open spontaneously, behavior appropriate to situation, follows commands, facial expression symmetrical, bilateral hand grasp equal and even, purposeful motor response noted, normal sensation in all extremities when touched with a finger. Pt reports HA and dizziness

## 2019-12-30 ENCOUNTER — OFFICE VISIT (OUTPATIENT)
Dept: RHEUMATOLOGY | Facility: CLINIC | Age: 49
End: 2019-12-30
Payer: MEDICAID

## 2019-12-30 ENCOUNTER — ANESTHESIA EVENT (OUTPATIENT)
Dept: SURGERY | Facility: HOSPITAL | Age: 49
End: 2019-12-30
Payer: MEDICAID

## 2019-12-30 ENCOUNTER — OFFICE VISIT (OUTPATIENT)
Dept: INTERNAL MEDICINE | Facility: CLINIC | Age: 49
End: 2019-12-30
Payer: MEDICAID

## 2019-12-30 VITALS
SYSTOLIC BLOOD PRESSURE: 102 MMHG | WEIGHT: 204.81 LBS | HEIGHT: 56 IN | TEMPERATURE: 99 F | HEART RATE: 94 BPM | DIASTOLIC BLOOD PRESSURE: 66 MMHG | OXYGEN SATURATION: 95 % | BODY MASS INDEX: 46.07 KG/M2

## 2019-12-30 VITALS
HEART RATE: 89 BPM | SYSTOLIC BLOOD PRESSURE: 125 MMHG | HEIGHT: 56 IN | BODY MASS INDEX: 45.82 KG/M2 | WEIGHT: 203.69 LBS | DIASTOLIC BLOOD PRESSURE: 83 MMHG

## 2019-12-30 DIAGNOSIS — M15.9 PRIMARY OSTEOARTHRITIS INVOLVING MULTIPLE JOINTS: ICD-10-CM

## 2019-12-30 DIAGNOSIS — E66.01 MORBID OBESITY WITH BMI OF 45.0-49.9, ADULT: Chronic | ICD-10-CM

## 2019-12-30 DIAGNOSIS — M79.7 FIBROMYALGIA: Primary | ICD-10-CM

## 2019-12-30 DIAGNOSIS — E55.9 VITAMIN D DEFICIENCY: Chronic | ICD-10-CM

## 2019-12-30 DIAGNOSIS — G89.29 CHRONIC BILATERAL LOW BACK PAIN WITHOUT SCIATICA: ICD-10-CM

## 2019-12-30 DIAGNOSIS — I10 ESSENTIAL HYPERTENSION: Chronic | ICD-10-CM

## 2019-12-30 DIAGNOSIS — E78.5 DYSLIPIDEMIA ASSOCIATED WITH TYPE 2 DIABETES MELLITUS: Chronic | ICD-10-CM

## 2019-12-30 DIAGNOSIS — E11.69 DYSLIPIDEMIA ASSOCIATED WITH TYPE 2 DIABETES MELLITUS: Chronic | ICD-10-CM

## 2019-12-30 DIAGNOSIS — M54.50 CHRONIC BILATERAL LOW BACK PAIN WITHOUT SCIATICA: ICD-10-CM

## 2019-12-30 DIAGNOSIS — E11.65 TYPE 2 DIABETES MELLITUS WITH HYPERGLYCEMIA, WITH LONG-TERM CURRENT USE OF INSULIN: Chronic | ICD-10-CM

## 2019-12-30 DIAGNOSIS — Z79.4 TYPE 2 DIABETES MELLITUS WITH HYPERGLYCEMIA, WITH LONG-TERM CURRENT USE OF INSULIN: Chronic | ICD-10-CM

## 2019-12-30 DIAGNOSIS — D50.9 IRON DEFICIENCY ANEMIA, UNSPECIFIED IRON DEFICIENCY ANEMIA TYPE: ICD-10-CM

## 2019-12-30 PROCEDURE — 99213 OFFICE O/P EST LOW 20 MIN: CPT | Mod: PBBFAC,25 | Performed by: FAMILY MEDICINE

## 2019-12-30 PROCEDURE — 99999 PR PBB SHADOW E&M-EST. PATIENT-LVL V: ICD-10-PCS | Mod: PBBFAC,,, | Performed by: PHYSICIAN ASSISTANT

## 2019-12-30 PROCEDURE — 99999 PR PBB SHADOW E&M-EST. PATIENT-LVL V: CPT | Mod: PBBFAC,,, | Performed by: PHYSICIAN ASSISTANT

## 2019-12-30 PROCEDURE — 99214 OFFICE O/P EST MOD 30 MIN: CPT | Mod: S$PBB,,, | Performed by: FAMILY MEDICINE

## 2019-12-30 PROCEDURE — 99214 PR OFFICE/OUTPT VISIT, EST, LEVL IV, 30-39 MIN: ICD-10-PCS | Mod: S$PBB,,, | Performed by: PHYSICIAN ASSISTANT

## 2019-12-30 PROCEDURE — 99214 OFFICE O/P EST MOD 30 MIN: CPT | Mod: S$PBB,,, | Performed by: PHYSICIAN ASSISTANT

## 2019-12-30 PROCEDURE — 99214 PR OFFICE/OUTPT VISIT, EST, LEVL IV, 30-39 MIN: ICD-10-PCS | Mod: S$PBB,,, | Performed by: FAMILY MEDICINE

## 2019-12-30 PROCEDURE — 99215 OFFICE O/P EST HI 40 MIN: CPT | Mod: PBBFAC,27,25 | Performed by: PHYSICIAN ASSISTANT

## 2019-12-30 PROCEDURE — 99999 PR PBB SHADOW E&M-EST. PATIENT-LVL III: CPT | Mod: PBBFAC,,, | Performed by: FAMILY MEDICINE

## 2019-12-30 PROCEDURE — 99999 PR PBB SHADOW E&M-EST. PATIENT-LVL III: ICD-10-PCS | Mod: PBBFAC,,, | Performed by: FAMILY MEDICINE

## 2019-12-30 RX ORDER — HYDRALAZINE HYDROCHLORIDE 10 MG/1
10 TABLET, FILM COATED ORAL EVERY 12 HOURS
Qty: 60 TABLET | Refills: 11 | Status: SHIPPED | OUTPATIENT
Start: 2019-12-30 | End: 2021-01-04 | Stop reason: SDUPTHER

## 2019-12-30 RX ORDER — CYCLOBENZAPRINE HCL 10 MG
10 TABLET ORAL 3 TIMES DAILY PRN
Qty: 90 TABLET | Refills: 0 | Status: SHIPPED | OUTPATIENT
Start: 2019-12-30 | End: 2020-04-13

## 2019-12-30 NOTE — PROGRESS NOTES
Subjective:       Patient ID: Yolanda Betts is a 49 y.o. female.    Chief Complaint: Disease Management    Yolanda is a 48 y/o f who has seen dr Gibbs in the past with OA and fibromyalgia.  She does take gabapentin 600mg bid. Also on cymbalta 60mg bid.  Today she c/o multiple issues including dizziness and abd pain, but from our standpoint c/o neck and back pain, generalized myalgia. Pain stays in her back, no radiation.  No weakness.  She is tender all over ab in her paraspinals.  She rates her pain today 7/10.  She does take percocet prn     Scheduled for cholecystectomy in less than a week.  D/w her pcp dizziness and is making BP medication adjustments.     Past Medical History:   Diagnosis Date    Abnormal Pap smear of cervix     HPV genital warts    Anemia     Anxiety     Arthritis     Asthma 10/11/2016    Bipolar 1 disorder     Diabetes mellitus, type 2     Dyslipidemia associated with type 2 diabetes mellitus 2019    Genital warts     GERD (gastroesophageal reflux disease)     Herpes simplex virus (HSV) infection     Hypertension     Hypertension complicating diabetes 2019    Migraine with aura and without status migrainosus, not intractable 3/21/2016    Mild persistent asthma without complication 10/11/2016    Morbid obesity with body mass index (BMI) of 45.0 to 49.9 in adult 8/3/2017    Obstructive sleep apnea     ALEN (obstructive sleep apnea)     2L per N/C q HS    Schizoaffective disorder, bipolar type 2019    Seasonal allergic rhinitis due to pollen 2019    Type 2 diabetes mellitus with hyperglycemia, with long-term current use of insulin 2017    Type 2 diabetes mellitus with hyperglycemia, without long-term current use of insulin 2017     Past Surgical History:   Procedure Laterality Date    abcess removed right back      BELT ABDOMINOPLASTY      BREAST SURGERY Bilateral     Reduction     SECTION      X 2    COLONOSCOPY       "COLONOSCOPY N/A 2/27/2018    Procedure: colonoscopy for iron deficiency anemia;  Surgeon: Farnkie Sanchez MD;  Location: [facility] ENDO;  Service: Endoscopy;  Laterality: N/A;    COLONOSCOPY N/A 2/28/2018    Procedure: COLONOSCOPY;  Surgeon: Frankie Sanchez MD;  Location: [facility] ENDO;  Service: Endoscopy;  Laterality: N/A;    HYSTERECTOMY      w/ BSO; hypermenorrhea    MOUTH SURGERY  1996    OOPHORECTOMY      hyst/bso, hypermenorrhea    TUBAL LIGATION       Family History   Problem Relation Age of Onset    Diabetes Mother     Hyperlipidemia Mother     Hypertension Mother     Asthma Mother     COPD Mother     Glaucoma Mother     Thyroid disease Mother     Anesthesia problems Mother         "almost had a cardiac arrest" , blood clots    Hypertension Father     Hyperlipidemia Father     Cancer Father         Brain, lung, liver, kidney    Heart disease Maternal Grandmother     Hyperlipidemia Maternal Grandmother     Hypertension Maternal Grandmother     Cataracts Maternal Grandmother     Diabetes Maternal Grandmother     Heart disease Maternal Grandfather     Hyperlipidemia Maternal Grandfather     Hypertension Maternal Grandfather     Glaucoma Maternal Grandfather     Cancer Maternal Grandfather     Cataracts Maternal Grandfather     Macular degeneration Maternal Grandfather     Diabetes Maternal Grandfather     Heart disease Paternal Grandmother     Hyperlipidemia Paternal Grandmother     Hypertension Paternal Grandmother     Cataracts Paternal Grandmother     Heart disease Paternal Grandfather     Hyperlipidemia Paternal Grandfather     Hypertension Paternal Grandfather     Cataracts Paternal Grandfather     Breast cancer Maternal Cousin     Breast cancer Maternal Cousin     Breast cancer Maternal Cousin     Breast cancer Maternal Cousin      Social History     Socioeconomic History    Marital status: Single     Spouse name: Not on file    Number of children: Not on file "    Years of education: Not on file    Highest education level: Not on file   Occupational History    Not on file   Social Needs    Financial resource strain: Not on file    Food insecurity:     Worry: Not on file     Inability: Not on file    Transportation needs:     Medical: Not on file     Non-medical: Not on file   Tobacco Use    Smoking status: Never Smoker    Smokeless tobacco: Never Used   Substance and Sexual Activity    Alcohol use: Yes     Alcohol/week: 0.0 standard drinks     Comment: socially  No alcohol 72h prior to sx    Drug use: No    Sexual activity: Not Currently     Partners: Male   Lifestyle    Physical activity:     Days per week: Not on file     Minutes per session: Not on file    Stress: Not on file   Relationships    Social connections:     Talks on phone: Not on file     Gets together: Not on file     Attends Sabianist service: Not on file     Active member of club or organization: Not on file     Attends meetings of clubs or organizations: Not on file     Relationship status: Not on file   Other Topics Concern    Not on file   Social History Narrative    Long-term care nurse     Review of patient's allergies indicates:   Allergen Reactions    Codeine Itching    Neuromuscular blockers, steroidal Hives     some    Latex, natural rubber Rash    Morphine Rash     itching    Norco [hydrocodone-acetaminophen] Itching, Rash and Hallucinations    Seconal [secobarbital sodium] Rash     itching    Tylox [oxycodone-acetaminophen] Rash     Review of Systems   Constitutional: Negative for chills, fatigue and fever.   HENT: Negative for mouth sores, rhinorrhea and sore throat.    Eyes: Negative for pain and redness.   Respiratory: Negative for cough and shortness of breath.    Cardiovascular: Negative for chest pain.   Gastrointestinal: Positive for abdominal pain. Negative for constipation, diarrhea, nausea and vomiting.   Genitourinary: Negative for dysuria and hematuria.  "  Musculoskeletal: Positive for arthralgias, back pain, myalgias and neck pain. Negative for joint swelling.   Skin: Negative for rash.   Neurological: Positive for dizziness. Negative for weakness, numbness and headaches.   Psychiatric/Behavioral: The patient is not nervous/anxious.          Objective:   /83   Pulse 89   Ht 4' 8" (1.422 m)   Wt 92.4 kg (203 lb 11.3 oz)   BMI 45.67 kg/m²   Immunization History   Administered Date(s) Administered    Influenza 10/01/2018    Influenza - Quadrivalent - PF (6 months and older) 12/21/2017    Pneumococcal Polysaccharide - 23 Valent 01/31/2017    Tdap 12/21/2017              Physical Exam   Constitutional: She is oriented to person, place, and time and well-developed, well-nourished, and in no distress.   HENT:   Head: Normocephalic and atraumatic.   Eyes: Pupils are equal, round, and reactive to light. Right eye exhibits no discharge.   Neck: Normal range of motion.   Cardiovascular: Normal rate, regular rhythm and normal heart sounds.  Exam reveals no friction rub.    Pulmonary/Chest: Effort normal and breath sounds normal. No respiratory distress.   Abdominal: Soft. She exhibits no distension. There is no tenderness.   Lymphadenopathy:     She has no cervical adenopathy.   Neurological: She is alert and oriented to person, place, and time.   Skin: No rash noted. No erythema.     Psychiatric: Mood normal.   Musculoskeletal: Normal range of motion. She exhibits no edema or deformity.   renee wrists, mcps, pips no synvoitis, no tenderness, no warmth, good rom  renee elbows shoulders no swelling or tenderness,full rom  renee knees no effusion, no warmth, no tenderness, full rom    Tender throughout her paraspinals cervical, thoracic and lumbar, tender to her shoulders and upper arms, thighs and lower legs              Recent Results (from the past 336 hour(s))   CBC auto differential    Collection Time: 12/16/19  2:55 PM   Result Value Ref Range    WBC 9.25 3.90 - " 12.70 K/uL    RBC 4.43 4.00 - 5.40 M/uL    Hemoglobin 12.1 12.0 - 16.0 g/dL    Hematocrit 40.0 37.0 - 48.5 %    Mean Corpuscular Volume 90 82 - 98 fL    Mean Corpuscular Hemoglobin 27.3 27.0 - 31.0 pg    Mean Corpuscular Hemoglobin Conc 30.3 (L) 32.0 - 36.0 g/dL    RDW 14.9 (H) 11.5 - 14.5 %    Platelets 345 150 - 350 K/uL    MPV 10.5 9.2 - 12.9 fL    Immature Granulocytes 0.3 0.0 - 0.5 %    Gran # (ANC) 5.2 1.8 - 7.7 K/uL    Immature Grans (Abs) 0.03 0.00 - 0.04 K/uL    Lymph # 3.4 1.0 - 4.8 K/uL    Mono # 0.4 0.3 - 1.0 K/uL    Eos # 0.1 0.0 - 0.5 K/uL    Baso # 0.06 0.00 - 0.20 K/uL    nRBC 0 0 /100 WBC    Gran% 56.2 38.0 - 73.0 %    Lymph% 37.1 18.0 - 48.0 %    Mono% 4.3 4.0 - 15.0 %    Eosinophil% 1.5 0.0 - 8.0 %    Basophil% 0.6 0.0 - 1.9 %    Differential Method Automated    Comprehensive metabolic panel    Collection Time: 12/16/19  2:55 PM   Result Value Ref Range    Sodium 137 136 - 145 mmol/L    Potassium 4.4 3.5 - 5.1 mmol/L    Chloride 97 95 - 110 mmol/L    CO2 28 23 - 29 mmol/L    Glucose 232 (H) 70 - 110 mg/dL    BUN, Bld 10 6 - 20 mg/dL    Creatinine 0.8 0.5 - 1.4 mg/dL    Calcium 10.0 8.7 - 10.5 mg/dL    Total Protein 7.8 6.0 - 8.4 g/dL    Albumin 3.9 3.5 - 5.2 g/dL    Total Bilirubin 0.3 0.1 - 1.0 mg/dL    Alkaline Phosphatase 85 55 - 135 U/L    AST 61 (H) 10 - 40 U/L    ALT 66 (H) 10 - 44 U/L    Anion Gap 12 8 - 16 mmol/L    eGFR if African American >60.0 >60 mL/min/1.73 m^2    eGFR if non African American >60.0 >60 mL/min/1.73 m^2   CBC auto differential    Collection Time: 12/28/19  3:45 PM   Result Value Ref Range    WBC 9.89 3.90 - 12.70 K/uL    RBC 4.22 4.00 - 5.40 M/uL    Hemoglobin 11.6 (L) 12.0 - 16.0 g/dL    Hematocrit 37.0 37.0 - 48.5 %    Mean Corpuscular Volume 88 82 - 98 fL    Mean Corpuscular Hemoglobin 27.5 27.0 - 31.0 pg    Mean Corpuscular Hemoglobin Conc 31.4 (L) 32.0 - 36.0 g/dL    RDW 14.3 11.5 - 14.5 %    Platelets 350 150 - 350 K/uL    MPV 9.8 9.2 - 12.9 fL    Immature  Granulocytes 0.4 0.0 - 0.5 %    Gran # (ANC) 5.7 1.8 - 7.7 K/uL    Immature Grans (Abs) 0.04 0.00 - 0.04 K/uL    Lymph # 3.4 1.0 - 4.8 K/uL    Mono # 0.5 0.3 - 1.0 K/uL    Eos # 0.1 0.0 - 0.5 K/uL    Baso # 0.05 0.00 - 0.20 K/uL    nRBC 0 0 /100 WBC    Gran% 58.0 38.0 - 73.0 %    Lymph% 34.7 18.0 - 48.0 %    Mono% 5.0 4.0 - 15.0 %    Eosinophil% 1.4 0.0 - 8.0 %    Basophil% 0.5 0.0 - 1.9 %    Differential Method Automated    Comprehensive metabolic panel    Collection Time: 12/28/19  3:45 PM   Result Value Ref Range    Sodium 137 136 - 145 mmol/L    Potassium 4.0 3.5 - 5.1 mmol/L    Chloride 98 95 - 110 mmol/L    CO2 26 23 - 29 mmol/L    Glucose 136 (H) 70 - 110 mg/dL    BUN, Bld 12 6 - 20 mg/dL    Creatinine 0.8 0.5 - 1.4 mg/dL    Calcium 9.5 8.7 - 10.5 mg/dL    Total Protein 7.5 6.0 - 8.4 g/dL    Albumin 3.8 3.5 - 5.2 g/dL    Total Bilirubin 0.3 0.1 - 1.0 mg/dL    Alkaline Phosphatase 82 55 - 135 U/L    AST 38 10 - 40 U/L    ALT 50 (H) 10 - 44 U/L    Anion Gap 13 8 - 16 mmol/L    eGFR if African American >60 >60 mL/min/1.73 m^2    eGFR if non African American >60 >60 mL/min/1.73 m^2   Lipase    Collection Time: 12/28/19  3:45 PM   Result Value Ref Range    Lipase 18 4 - 60 U/L   Urinalysis, Reflex to Urine Culture Urine, Clean Catch    Collection Time: 12/28/19  3:50 PM   Result Value Ref Range    Specimen UA Urine, Clean Catch     Color, UA Yellow Yellow, Straw, Milagros    Appearance, UA Clear Clear    pH, UA 5.0 5.0 - 8.0    Specific Gravity, UA 1.010 1.005 - 1.030    Protein, UA Negative Negative    Glucose, UA Negative Negative    Ketones, UA Negative Negative    Bilirubin (UA) Negative Negative    Occult Blood UA Negative Negative    Nitrite, UA Negative Negative    Urobilinogen, UA Negative <2.0 EU/dL    Leukocytes, UA Negative Negative   POCT glucose    Collection Time: 12/29/19  4:36 PM   Result Value Ref Range    POCT Glucose 105 70 - 110 mg/dL        No results found for: TBGOLDPLUS   Lab Results    Component Value Date    HEPAIGM Negative 09/04/2019    HEPBIGM Negative 09/04/2019    HEPCAB Negative 09/04/2019        Assessment:       1. Fibromyalgia    2. Primary osteoarthritis involving multiple joints    3. Chronic bilateral low back pain without sciatica          Impression:   Fibromyalgia: increased activity today; taking cymbalta 60mg bid and gabapentin 600mg bid     Neck and back pain: muscular, she does have mild lumbar djd on xray     Plan:       Add Flexeril 10mg q hs then increase if christiano to tid   Do not take flexeril with percocet   Cont cymbalta as taking 60mg bid     Keep f/u with dr anderson as scheduled

## 2019-12-30 NOTE — PATIENT INSTRUCTIONS
Add flexeril 10mg at night then if christiano can take up to 3 times a day     Do not take with percocet     Keep appt with dr anderson

## 2019-12-30 NOTE — Clinical Note
Buffy Tate.Yolanda reports that since she started the hydralazine 25 mg twice daily a few weeks ago, she has been feeling dizzy/lightheaded, and her blood pressure has been noted to be below the therapeutic goal.  Because of this, I reduced her dose from 25 mg to 10 mg twice daily.She has a follow-up appointment with you next week.Thanks.Ady

## 2019-12-30 NOTE — ASSESSMENT & PLAN NOTE
BP Readings from Last 6 Encounters:   12/30/19 102/66   12/29/19 (!) 108/59   12/28/19 123/75   12/16/19 116/73   12/09/19 126/82   12/03/19 136/80

## 2019-12-30 NOTE — ASSESSMENT & PLAN NOTE
Lab Results   Component Value Date    [id number] 15 (L) 04/25/2019    [id number] 17 (L) 03/22/2016

## 2019-12-30 NOTE — ASSESSMENT & PLAN NOTE
Wt Readings from Last 6 Encounters:   12/30/19 92.9 kg (204 lb 12.9 oz)   12/29/19 90.2 kg (198 lb 13.7 oz)   12/28/19 89.8 kg (198 lb 1.3 oz)   12/18/19 91.8 kg (202 lb 6.1 oz)   12/16/19 91.2 kg (201 lb)   12/09/19 91.4 kg (201 lb 8 oz)     BMI Readings from Last 2 Encounters:   12/30/19 45.92 kg/m²   12/29/19 44.58 kg/m²

## 2019-12-30 NOTE — PROGRESS NOTES
"CHIEF COMPLAINT  Follow-up and Dizziness      HISTORY, ASSESSMENT, and PLAN    1. Essential hypertension        ASSESSMENT:  Since starting the hydralazine 25 mg BID a few weeks ago, she reports that her blood pressures have been running low, and she has been feeling dizzy.    2. Type 2 diabetes mellitus with hyperglycemia, with long-term current use of insulin        ASSESSMENT:  Improved, but still not well controlled.  She has follow-up with endocrinology in two weeks.    3. Dyslipidemia associated with type 2 diabetes mellitus        ASSESSMENT: She appears to be tolerating statin for dyslipidemia without apparent symptoms of myositis or hepatic dysfunction.     4. Iron deficiency anemia, unspecified iron deficiency anemia type        ASSESSMENT: Compensated/controlled and stable.     5. Vitamin D deficiency        ASSESSMENT:  Taking vitamin D as directed.    6. Morbid obesity with BMI of 45.0-49.9, adult        ASSESSMENT:  Worse.  Therapeutic lifestyle changes encouraged.     She is scheduled to have her gallbladder removed on January 3.  She says that she has already had her preoperative evaluation and does not need any further testing/evaluation from me.    Orders Placed This Encounter    hydrALAZINE (APRESOLINE) 10 MG tablet        Medication List with Changes/Refills   New Medications    HYDRALAZINE (APRESOLINE) 10 MG TABLET    Take 1 tablet (10 mg total) by mouth every 12 (twelve) hours.   Current Medications    ALPRAZOLAM (XANAX) 2 MG TAB    Take 1 tablet by mouth twice daily    AMLODIPINE (NORVASC) 10 MG TABLET    Take 10 mg by mouth once daily.    ARIPIPRAZOLE (ABILIFY) 10 MG TAB    Take 1 tablet by mouth every day    ASPIRIN (ECOTRIN) 81 MG EC TABLET    Take 81 mg by mouth once daily.    ATORVASTATIN (LIPITOR) 20 MG TABLET    Take 1 tablet (20 mg total) by mouth every evening.    BD INSULIN SYRINGE ULTRA-FINE 1/2 ML 30 GAUGE X 1/2" SYRG        BENZTROPINE (COGENTIN) 1 MG TABLET    Take 1 tablet by " mouth at bedtime    BLOOD SUGAR DIAGNOSTIC STRP    Check blood glucose 4 times daily as directed and as needed (dispense insurance preferred brand or patient choice)    BLOOD-GLUCOSE METER MISC    Use to check blood sugars, give meter based on insurance specification    CYCLOSPORINE (RESTASIS) 0.05 % OPHTHALMIC EMULSION    Place 0.4 mLs (1 drop total) into both eyes 2 (two) times daily.    DULOXETINE (CYMBALTA) 60 MG CAPSULE    Take 1 capsule by mouth twice daily    ERGOCALCIFEROL (ERGOCALCIFEROL) 50,000 UNIT CAP    Take 1 capsule (50,000 Units total) by mouth every 14 (fourteen) days.    FISH OIL-OMEGA-3 FATTY ACIDS 300-1,000 MG CAPSULE    Take 1 capsule by mouth once daily.    FLUTICASONE PROPIONATE (FLONASE) 50 MCG/ACTUATION NASAL SPRAY    2 sprays (100 mcg total) by Each Nare route once daily.    FROVATRIPTAN (FROVA) 2.5 MG TABLET    Take one tablet orally. If recurs, may repeat after 2 hours. Max of 3 tabs in 24 hours.    FUROSEMIDE (LASIX) 20 MG TABLET    Take 1 tablet (20 mg total) by mouth once daily.    GABAPENTIN (NEURONTIN) 300 MG CAPSULE    Take 2 capsules (600 mg total) by mouth 3 (three) times daily.    INSULIN ASPART U-100 (NOVOLOG FLEXPEN U-100 INSULIN) 100 UNIT/ML (3 ML) INPN PEN    Inject before meals three times a day up to 30 units daily    LANCETS 28 GAUGE MISC    use as directed 3 times daily    LINZESS 145 MCG CAP CAPSULE    Take 145 mcg by mouth as needed.     LIRAGLUTIDE 0.6 MG/0.1 ML, 18 MG/3 ML, SUBQ PNIJ (VICTOZA 2-MILENA) 0.6 MG/0.1 ML (18 MG/3 ML) PNIJ    Use 1.8mg under the skin daily    LURASIDONE (LATUDA) 120 MG TAB    Take 1 tablet by mouth at 5 pm with food    MAGNESIUM OXIDE (MAG-OX) 400 MG (241.3 MG MAGNESIUM) TABLET    Take 1 tablet (400 mg total) by mouth once daily.    MECLIZINE (ANTIVERT) 25 MG TABLET    Take 1 tablet (25 mg total) by mouth 3 (three) times daily as needed.    METFORMIN (GLUCOPHAGE) 1000 MG TABLET    TAKE 1 TABLET (1,000 MG TOTAL) BY MOUTH 2 (TWO) TIMES DAILY.     "METOPROLOL SUCCINATE (TOPROL-XL) 100 MG 24 HR TABLET    Take 2 tablets (200 mg total) by mouth every evening.    MIRTAZAPINE (REMERON) 45 MG TABLET    Take 1 tablet by mouth at bedtime    MOMETASONE (ASMANEX TWISTHALER) 220 MCG (120 DOSES) AEPB    Inhale 2 puffs into the lungs 2 (two) times daily. Controller    MULTIVIT,CALC,MINS/IRON/FOLIC (DAILY MULTIPLE FOR WOMEN ORAL)    Take 1 tablet by mouth once daily.    OXYCODONE-ACETAMINOPHEN (PERCOCET) 5-325 MG PER TABLET    Take 1 tablet by mouth every 8 (eight) hours as needed for Pain.    PEN NEEDLE, DIABETIC (BD ULTRA-FINE MINI PEN NEEDLE) 31 GAUGE X 3/16" NDLE    Use 5 a day    PROMETHAZINE (PHENERGAN) 25 MG TABLET    Take 1 tablet (25 mg total) by mouth every 4 (four) hours.    SPIRONOLACTONE (ALDACTONE) 25 MG TABLET    Take 1 tablet (25 mg total) by mouth once daily.    TOPIRAMATE (TOPAMAX) 50 MG TABLET    Take 1 tablet (50 mg total) by mouth 2 (two) times daily.    TOUJEO SOLOSTAR U-300 INSULIN 300 UNIT/ML (1.5 ML) INPN PEN    Inject 75 units daily    TRIAZOLAM (HALCION) 0.25 MG TAB    Take 1 tablet by mouth at bedtime    VALACYCLOVIR (VALTREX) 1000 MG TABLET    Take 1 tablet (1,000 mg total) by mouth once daily.    VALSARTAN (DIOVAN) 320 MG TABLET    Take 1 tablet (320 mg total) by mouth once daily.   Discontinued Medications    CLONIDINE (CATAPRES) 0.1 MG TABLET    Take 1 tablet (0.1 mg total) by mouth every 6 (six) hours as needed (for SBP>150 or DBP>100 mmHg).    HYDRALAZINE (APRESOLINE) 25 MG TABLET    Take 1 tablet (25 mg total) by mouth every 12 (twelve) hours.    MUPIROCIN (BACTROBAN) 2 % OINTMENT    Apply topically 3 (three) times daily.    PROMETHAZINE (PHENERGAN) 25 MG TABLET    Take 1 tablet (25 mg total) by mouth every 8 (eight) hours as needed for Nausea. sedating       No follow-ups on file.    Problem List Items Addressed This Visit        Cardiac/Vascular    Essential hypertension (Chronic)    Current Assessment & Plan     BP Readings from Last 6 " Encounters:   12/30/19 102/66   12/29/19 (!) 108/59   12/28/19 123/75   12/16/19 116/73   12/09/19 126/82   12/03/19 136/80            Relevant Medications    hydrALAZINE (APRESOLINE) 10 MG tablet    Dyslipidemia associated with type 2 diabetes mellitus (Chronic)    Current Assessment & Plan     Lab Results   Component Value Date    CHOL 142 04/25/2019    CHOL 102 (L) 12/22/2017    TRIG 328 (H) 04/25/2019    TRIG 127 12/22/2017    HDL 36 (L) 04/25/2019    HDL 43 12/22/2017    LDLCALC 40.4 (L) 04/25/2019    LDLCALC 33.6 (L) 12/22/2017    NONHDLCHOL 106 04/25/2019    NONHDLCHOL 59 12/22/2017    AST 38 12/28/2019    ALT 50 (H) 12/28/2019     The 10-year ASCVD risk score (Jose TRENT Jr., et al., 2013) is: 3.5%    Values used to calculate the score:      Age: 49 years      Sex: Female      Is Non- : Yes      Diabetic: Yes      Tobacco smoker: No      Systolic Blood Pressure: 102 mmHg      Is BP treated: Yes      HDL Cholesterol: 36 mg/dL      Total Cholesterol: 142 mg/dL             Oncology    Iron deficiency anemia    Current Assessment & Plan     Lab Results   Component Value Date    WBC 9.89 12/28/2019    WBC 9.25 12/16/2019    RBC 4.22 12/28/2019    RBC 4.43 12/16/2019    HGB 11.6 (L) 12/28/2019    HGB 12.1 12/16/2019    HCT 37.0 12/28/2019    HCT 40.0 12/16/2019     12/28/2019     12/16/2019     Lab Results   Component Value Date    MCV 88 12/28/2019    MCH 27.5 12/28/2019    MCHC 31.4 (L) 12/28/2019    RDW 14.3 12/28/2019     Lab Results   Component Value Date    FERRITIN 35 03/31/2017    IRON 39 03/31/2017    TRANSFERRIN 335 03/31/2017    TIBC 496 (H) 03/31/2017    FESATURATED 8 (L) 03/31/2017    [id number] 426 03/31/2017    FOLATE 10.0 03/31/2017               Endocrine    Type 2 diabetes mellitus with hyperglycemia, with long-term current use of insulin (Chronic)    Current Assessment & Plan     Diabetes Management Status    Statin: Taking  ACE/ARB: Taking    Screening or  Prevention Patient's value Goal Complete/Controlled?   HgA1C Testing and Control   Lab Results   Component Value Date    HGBA1C 9.9 (H) 11/18/2019      Annually/Less than 8% No   Lipid profile : 04/25/2019 Annually Yes   LDL control Lab Results   Component Value Date    LDLCALC 40.4 (L) 04/25/2019    Annually/Less than 100 mg/dl  Yes   Nephropathy screening Lab Results   Component Value Date    LABMICR 7.0 04/25/2019     Lab Results   Component Value Date    PROTEINUA Negative 12/28/2019    Annually Yes   Blood pressure BP Readings from Last 1 Encounters:   12/30/19 102/66    Less than 140/90 Yes   Dilated retinal exam : 05/22/2019 Annually Yes   Foot exam   : 05/16/2019 Annually Yes     Lab Results   Component Value Date    HGBA1C 9.9 (H) 11/18/2019    HGBA1C 8.4 (H) 10/14/2019    HGBA1C 7.7 (H) 06/24/2019    ESTGFRAFRICA >60 12/28/2019    EGFRNONAA >60 12/28/2019    MICALBCREAT 10.4 04/25/2019    LDLCALC 40.4 (L) 04/25/2019       HEALTH MAINTENANCE: Diabetic health maintenance interventions reviewed and are up to date except for:  There are no preventive care reminders to display for this patient.          Vitamin D deficiency (Chronic)    Current Assessment & Plan     Lab Results   Component Value Date    [id number] 15 (L) 04/25/2019    [id number] 17 (L) 03/22/2016             Morbid obesity with BMI of 45.0-49.9, adult (Chronic)    Current Assessment & Plan     Wt Readings from Last 6 Encounters:   12/30/19 92.9 kg (204 lb 12.9 oz)   12/29/19 90.2 kg (198 lb 13.7 oz)   12/28/19 89.8 kg (198 lb 1.3 oz)   12/18/19 91.8 kg (202 lb 6.1 oz)   12/16/19 91.2 kg (201 lb)   12/09/19 91.4 kg (201 lb 8 oz)     BMI Readings from Last 2 Encounters:   12/30/19 45.92 kg/m²   12/29/19 44.58 kg/m²                    REVIEW OF SYSTEMS  CARDIOVASCULAR: No angina or orthopnea reported.   ENDOCRINE: No polyuria or polydipsia reported.     PHYSICAL EXAM  Vitals:    12/30/19 0808   BP: 102/66   BP Location: Right arm   Patient  "Position: Sitting   BP Method: Large (Manual)   Pulse: 94   Temp: 98.8 °F (37.1 °C)   TempSrc: Tympanic   SpO2: 95%   Weight: 92.9 kg (204 lb 12.9 oz)   Height: 4' 8" (1.422 m)     CONSTITUTIONAL: Vital signs noted. No apparent distress. Does not appear acutely ill or septic. Appears adequately hydrated.  HEENT: External ENT grossly unremarkable. Hearing grossly intact. Oropharynx moist.  PULM: Lungs clear. Breathing unlabored.  HEART: Auscultation reveals regular rate and rhythm without murmur, gallop or rub.  DERM: Skin warm and moist with normal turgor.  NEURO: There are no gross focal motor deficits or gross deficits of cranial nerves III-XII.  PSYCHIATRIC: Alert and conversant. Mood grossly neutral. Judgment and insight not grossly compromised.     Documentation entered by me for this encounter may have been done in part using speech-recognition technology. Although I have made an effort to ensure accuracy, "sound like" errors may exist and should be interpreted in context. -DOMINIK Beach MD.   "

## 2019-12-30 NOTE — ASSESSMENT & PLAN NOTE
Lab Results   Component Value Date    WBC 9.89 12/28/2019    WBC 9.25 12/16/2019    RBC 4.22 12/28/2019    RBC 4.43 12/16/2019    HGB 11.6 (L) 12/28/2019    HGB 12.1 12/16/2019    HCT 37.0 12/28/2019    HCT 40.0 12/16/2019     12/28/2019     12/16/2019     Lab Results   Component Value Date    MCV 88 12/28/2019    MCH 27.5 12/28/2019    MCHC 31.4 (L) 12/28/2019    RDW 14.3 12/28/2019     Lab Results   Component Value Date    FERRITIN 35 03/31/2017    IRON 39 03/31/2017    TRANSFERRIN 335 03/31/2017    TIBC 496 (H) 03/31/2017    FESATURATED 8 (L) 03/31/2017    [id number] 426 03/31/2017    FOLATE 10.0 03/31/2017

## 2019-12-30 NOTE — ASSESSMENT & PLAN NOTE
Diabetes Management Status    Statin: Taking  ACE/ARB: Taking    Screening or Prevention Patient's value Goal Complete/Controlled?   HgA1C Testing and Control   Lab Results   Component Value Date    HGBA1C 9.9 (H) 11/18/2019      Annually/Less than 8% No   Lipid profile : 04/25/2019 Annually Yes   LDL control Lab Results   Component Value Date    LDLCALC 40.4 (L) 04/25/2019    Annually/Less than 100 mg/dl  Yes   Nephropathy screening Lab Results   Component Value Date    LABMICR 7.0 04/25/2019     Lab Results   Component Value Date    PROTEINUA Negative 12/28/2019    Annually Yes   Blood pressure BP Readings from Last 1 Encounters:   12/30/19 102/66    Less than 140/90 Yes   Dilated retinal exam : 05/22/2019 Annually Yes   Foot exam   : 05/16/2019 Annually Yes     Lab Results   Component Value Date    HGBA1C 9.9 (H) 11/18/2019    HGBA1C 8.4 (H) 10/14/2019    HGBA1C 7.7 (H) 06/24/2019    ESTGFRAFRICA >60 12/28/2019    EGFRNONAA >60 12/28/2019    MICALBCREAT 10.4 04/25/2019    LDLCALC 40.4 (L) 04/25/2019       HEALTH MAINTENANCE: Diabetic health maintenance interventions reviewed and are up to date except for:  There are no preventive care reminders to display for this patient.

## 2019-12-30 NOTE — ASSESSMENT & PLAN NOTE
Lab Results   Component Value Date    CHOL 142 04/25/2019    CHOL 102 (L) 12/22/2017    TRIG 328 (H) 04/25/2019    TRIG 127 12/22/2017    HDL 36 (L) 04/25/2019    HDL 43 12/22/2017    LDLCALC 40.4 (L) 04/25/2019    LDLCALC 33.6 (L) 12/22/2017    NONHDLCHOL 106 04/25/2019    NONHDLCHOL 59 12/22/2017    AST 38 12/28/2019    ALT 50 (H) 12/28/2019     The 10-year ASCVD risk score (Jose TRENT Jr., et al., 2013) is: 3.5%    Values used to calculate the score:      Age: 49 years      Sex: Female      Is Non- : Yes      Diabetic: Yes      Tobacco smoker: No      Systolic Blood Pressure: 102 mmHg      Is BP treated: Yes      HDL Cholesterol: 36 mg/dL      Total Cholesterol: 142 mg/dL

## 2020-01-02 ENCOUNTER — LAB VISIT (OUTPATIENT)
Dept: LAB | Facility: HOSPITAL | Age: 50
End: 2020-01-02
Attending: NURSE PRACTITIONER
Payer: MEDICAID

## 2020-01-02 ENCOUNTER — OFFICE VISIT (OUTPATIENT)
Dept: CARDIOLOGY | Facility: CLINIC | Age: 50
End: 2020-01-02
Payer: MEDICAID

## 2020-01-02 ENCOUNTER — TELEPHONE (OUTPATIENT)
Dept: CARDIOLOGY | Facility: CLINIC | Age: 50
End: 2020-01-02

## 2020-01-02 ENCOUNTER — TELEPHONE (OUTPATIENT)
Dept: SURGERY | Facility: CLINIC | Age: 50
End: 2020-01-02

## 2020-01-02 ENCOUNTER — CLINICAL SUPPORT (OUTPATIENT)
Dept: CARDIOLOGY | Facility: CLINIC | Age: 50
End: 2020-01-02
Payer: MEDICAID

## 2020-01-02 VITALS
BODY MASS INDEX: 45.82 KG/M2 | WEIGHT: 204.38 LBS | SYSTOLIC BLOOD PRESSURE: 116 MMHG | HEART RATE: 78 BPM | DIASTOLIC BLOOD PRESSURE: 80 MMHG

## 2020-01-02 DIAGNOSIS — I15.2 HYPERTENSION COMPLICATING DIABETES: Chronic | ICD-10-CM

## 2020-01-02 DIAGNOSIS — I10 ESSENTIAL HYPERTENSION: Primary | ICD-10-CM

## 2020-01-02 DIAGNOSIS — E11.59 HYPERTENSION COMPLICATING DIABETES: Chronic | ICD-10-CM

## 2020-01-02 DIAGNOSIS — E83.42 HYPOMAGNESEMIA: ICD-10-CM

## 2020-01-02 DIAGNOSIS — I10 ESSENTIAL HYPERTENSION: ICD-10-CM

## 2020-01-02 DIAGNOSIS — E11.69 DYSLIPIDEMIA ASSOCIATED WITH TYPE 2 DIABETES MELLITUS: Chronic | ICD-10-CM

## 2020-01-02 DIAGNOSIS — Z01.810 PREOP CARDIOVASCULAR EXAM: Primary | ICD-10-CM

## 2020-01-02 DIAGNOSIS — I10 ESSENTIAL HYPERTENSION: Chronic | ICD-10-CM

## 2020-01-02 DIAGNOSIS — E78.1 HYPERTRIGLYCERIDEMIA: Chronic | ICD-10-CM

## 2020-01-02 DIAGNOSIS — E78.5 DYSLIPIDEMIA ASSOCIATED WITH TYPE 2 DIABETES MELLITUS: Chronic | ICD-10-CM

## 2020-01-02 LAB
ANION GAP SERPL CALC-SCNC: 10 MMOL/L (ref 8–16)
BUN SERPL-MCNC: 9 MG/DL (ref 6–20)
CALCIUM SERPL-MCNC: 9.7 MG/DL (ref 8.7–10.5)
CHLORIDE SERPL-SCNC: 101 MMOL/L (ref 95–110)
CO2 SERPL-SCNC: 28 MMOL/L (ref 23–29)
CREAT SERPL-MCNC: 0.7 MG/DL (ref 0.5–1.4)
EST. GFR  (AFRICAN AMERICAN): >60 ML/MIN/1.73 M^2
EST. GFR  (NON AFRICAN AMERICAN): >60 ML/MIN/1.73 M^2
GLUCOSE SERPL-MCNC: 151 MG/DL (ref 70–110)
MAGNESIUM SERPL-MCNC: 1.9 MG/DL (ref 1.6–2.6)
POTASSIUM SERPL-SCNC: 4.2 MMOL/L (ref 3.5–5.1)
SODIUM SERPL-SCNC: 139 MMOL/L (ref 136–145)

## 2020-01-02 PROCEDURE — 93010 EKG 12-LEAD: ICD-10-PCS | Mod: S$PBB,,, | Performed by: INTERNAL MEDICINE

## 2020-01-02 PROCEDURE — 83735 ASSAY OF MAGNESIUM: CPT

## 2020-01-02 PROCEDURE — 99213 OFFICE O/P EST LOW 20 MIN: CPT | Mod: PBBFAC | Performed by: INTERNAL MEDICINE

## 2020-01-02 PROCEDURE — 99999 PR PBB SHADOW E&M-EST. PATIENT-LVL III: ICD-10-PCS | Mod: PBBFAC,,, | Performed by: INTERNAL MEDICINE

## 2020-01-02 PROCEDURE — 93005 ELECTROCARDIOGRAM TRACING: CPT | Mod: PBBFAC | Performed by: INTERNAL MEDICINE

## 2020-01-02 PROCEDURE — 99214 OFFICE O/P EST MOD 30 MIN: CPT | Mod: 25,S$PBB,, | Performed by: INTERNAL MEDICINE

## 2020-01-02 PROCEDURE — 36415 COLL VENOUS BLD VENIPUNCTURE: CPT

## 2020-01-02 PROCEDURE — 99999 PR PBB SHADOW E&M-EST. PATIENT-LVL III: CPT | Mod: PBBFAC,,, | Performed by: INTERNAL MEDICINE

## 2020-01-02 PROCEDURE — 99214 PR OFFICE/OUTPT VISIT, EST, LEVL IV, 30-39 MIN: ICD-10-PCS | Mod: 25,S$PBB,, | Performed by: INTERNAL MEDICINE

## 2020-01-02 PROCEDURE — 93010 ELECTROCARDIOGRAM REPORT: CPT | Mod: S$PBB,,, | Performed by: INTERNAL MEDICINE

## 2020-01-02 PROCEDURE — 80048 BASIC METABOLIC PNL TOTAL CA: CPT

## 2020-01-02 NOTE — TELEPHONE ENCOUNTER
Patient:   MARTINEZ CURIEL            MRN: [id number]            FIN: [id number]              Age:   24 years     Sex:  FEMALE     :  96   Associated Diagnoses:   None   Author:   CABRERA ROSE: Pt has been ambulating. She complains of whole body itching only minimally improved with Nalbuphine. States pain is well controlled on PO pain med. +Flatus, -BM. Urinating spontaneously. Minimal lochia. Tolerating general. Denies HA, dizziness, SOB/CP. Denies N/V.    O:    Vitals between:   2019 09:33:35   TO   2019 09:33:35                   LAST RESULT MINIMUM MAXIMUM  Temperature 36.5 36.4 36.9  Heart Rate 84 57 92  Respiratory Rate 18 16 18  NISBP           116 114 185  NIDBP           83 55 95  NIMBP           94 76 122  SpO2                    96 96 100  Labs between:  2019 09:33 to 2019 09:33  CBC:                 WBC  HgB  Hct  Plt  MCV  RDW   2019 (H) 11.4  (L) 8.4  (L) 26.5  198  83.1  (H) 15.7   BMP:                 Na  Cl  BUN  Glu   2019 138  107  8  78                              K  CO2  Cr  Ca                              4.4  26  0.55  8.7   BMP:                 Na  Cl  BUN  Glu   2019 138  105  8  87                              K  CO2  Cr  Ca                              4.1  26  (L) 0.50  8.7   CMP:                 AST  ALT  AlkPhos  Bili  Albumin   2019 21  13  (H) 163  0.5  (L) 2.0   2019 21  13  (H) 230  0.4  (L) 2.5                  Gen: NAD, lying in bed comfortably  CV: RRR, Normal S1, S2, No r/m/g  Lungs: CTABL  Abd: Soft, non tender, no fundal tenderness, nondistended, fundus firm at umbilicus  Incision: clean, dry, intact with steri strips  Breast: Nontender, no masses, no lesions  Ext: nontender, no edema, SCDs in place    PNL: O+/antibody neg / Hgb 12.4/rubella imm/varicella imm/RPR neg/HepBsAg neg/HIV neg/ Ucx neg/ quant gold neg  -s/p Tdap  HgB: 11.0 > EBL: 600ml > 8.4   A/P: 23yo  POD#1 s/p  Pt is having her gallbladder removed tomorrow with Dr. Driscoll and needs cardiac clearance. Pt was last seen on 11/27/19 and last EKG on 10/7/19. Would you like this pt to be seen for pre-op clearance?    uncomplicated scheduled repeat C section, recovering well  1. Vitals: wnl since arrival to [facility] (see below)  2. Hematologic:   -Anemia: Asymptomatic, will d/c on iron + colace  -Rh: Mom Rh positive- Rhogam not indicated  -EBL: 600mL  3. Pain: well controlled on PO Tylenol and motrin with Oxycodone PRN  4. : Urinating spontanesouly, minimal lochia  5. GI: General diet  6. Prophylaxis: SCDs, IS, ambulation  7. Immunizations: UTD  8. Preeclampsia without severe features  -diagnosed with mild range BPs in immediate post-operative period and PCR of 351  -CBC/CMP wnl x2  -normotensive since delivery, will continue to monitor BPs closely  9. Itching  -no evidence of hives or dermatitis  -will order Benadrl and Hydroxyzine PRN  10. Breast feeding  -lactation consultation PRN  11. Birth control method: desires Depo shot, will give on day of discharge home  12. Dispo: discharge to home POD#3-4     Yumiko Poe,  PGY4

## 2020-01-02 NOTE — TELEPHONE ENCOUNTER
Patient was supposed to follow up in 1 month after seeing Buffy. If her procedure is elective, I think she needs to be evaluated to reassess repsonse to med changes. If emergent, we can clear at moderate risk

## 2020-01-02 NOTE — PROGRESS NOTES
Subjective:   Patient ID:  Yolanda Betts is a 49 y.o. female who presents for cardiac consult of Pre-op Exam (consult )      HPI  The patient came in today for cardiac consult of Pre-op Exam (consult )    Yolanda Betts is a 49 y.o. female pt with HTN, HFpEF, HLP, DM, GERD, Morbid Obesity, ALEN here for preop CV eval.     Pt has been seen by Buffy Root in past and preop assessment by Carrillo Livingston. She has robotic cholecystectomy by Dr. Driscoll tomorrow.   She has bene having abd pain for years and u/s with gallstones and will have surgery.     ECG - NSR    Patient feels no chest pain, no sob, no leg swelling, no PND, no palpitation, no dizziness, no syncope, no CNS symptoms.    Patient has fairly good exercise tolerance.    Patient is compliant with medications.      CONCLUSIONS     1 - Concentric hypertrophy.     2 - No wall motion abnormalities.     3 - Normal left ventricular systolic function (EF 60-65%).     4 - Impaired LV relaxation, normal LAP (grade 1 diastolic dysfunction).     5 - Normal right ventricular systolic function .         This document has been electronically    SIGNED BY: David Elena MD On: 06/06/2019 19:13    Past Medical History:   Diagnosis Date    Abnormal Pap smear of cervix     HPV genital warts    Anemia     Anxiety     Arthritis     Asthma 10/11/2016    Bipolar 1 disorder     Diabetes mellitus, type 2     Dyslipidemia associated with type 2 diabetes mellitus 5/13/2019    Genital warts     GERD (gastroesophageal reflux disease)     Herpes simplex virus (HSV) infection     Hypertension     Hypertension complicating diabetes 5/5/2019    Migraine with aura and without status migrainosus, not intractable 3/21/2016    Mild persistent asthma without complication 10/11/2016    Morbid obesity with body mass index (BMI) of 45.0 to 49.9 in adult 8/3/2017    Obstructive sleep apnea     ALEN (obstructive sleep apnea)     2L per N/C q HS    Schizoaffective disorder,  "bipolar type 2019    Seasonal allergic rhinitis due to pollen 2019    Type 2 diabetes mellitus with hyperglycemia, with long-term current use of insulin 2017    Type 2 diabetes mellitus with hyperglycemia, without long-term current use of insulin 2017       Past Surgical History:   Procedure Laterality Date    abcess removed right back      BELT ABDOMINOPLASTY      BREAST SURGERY Bilateral 1996    Reduction     SECTION      X 2    COLONOSCOPY      COLONOSCOPY N/A 2018    Procedure: colonoscopy for iron deficiency anemia;  Surgeon: Frankie Sanchez MD;  Location: [facility] ENDO;  Service: Endoscopy;  Laterality: N/A;    COLONOSCOPY N/A 2018    Procedure: COLONOSCOPY;  Surgeon: Frankie Sanchez MD;  Location: [facility] ENDO;  Service: Endoscopy;  Laterality: N/A;    HYSTERECTOMY      w/ BSO; hypermenorrhea    MOUTH SURGERY      OOPHORECTOMY      hyst/bso, hypermenorrhea    TUBAL LIGATION         Social History     Tobacco Use    Smoking status: Never Smoker    Smokeless tobacco: Never Used   Substance Use Topics    Alcohol use: Yes     Alcohol/week: 0.0 standard drinks     Comment: socially  No alcohol 72h prior to sx    Drug use: No       Family History   Problem Relation Age of Onset    Diabetes Mother     Hyperlipidemia Mother     Hypertension Mother     Asthma Mother     COPD Mother     Glaucoma Mother     Thyroid disease Mother     Anesthesia problems Mother         "almost had a cardiac arrest" , blood clots    Hypertension Father     Hyperlipidemia Father     Cancer Father         Brain, lung, liver, kidney    Heart disease Maternal Grandmother     Hyperlipidemia Maternal Grandmother     Hypertension Maternal Grandmother     Cataracts Maternal Grandmother     Diabetes Maternal Grandmother     Heart disease Maternal Grandfather     Hyperlipidemia Maternal Grandfather     Hypertension Maternal Grandfather     Glaucoma Maternal " "Grandfather     Cancer Maternal Grandfather     Cataracts Maternal Grandfather     Macular degeneration Maternal Grandfather     Diabetes Maternal Grandfather     Heart disease Paternal Grandmother     Hyperlipidemia Paternal Grandmother     Hypertension Paternal Grandmother     Cataracts Paternal Grandmother     Heart disease Paternal Grandfather     Hyperlipidemia Paternal Grandfather     Hypertension Paternal Grandfather     Cataracts Paternal Grandfather     Breast cancer Maternal Cousin     Breast cancer Maternal Cousin     Breast cancer Maternal Cousin     Breast cancer Maternal Cousin        Patient's Medications   New Prescriptions    No medications on file   Previous Medications    ALPRAZOLAM (XANAX) 2 MG TAB    Take 1 tablet by mouth twice daily    AMLODIPINE (NORVASC) 10 MG TABLET    Take 10 mg by mouth once daily.    ARIPIPRAZOLE (ABILIFY) 10 MG TAB    Take 1 tablet by mouth every day    ASPIRIN (ECOTRIN) 81 MG EC TABLET    Take 81 mg by mouth once daily.    ATORVASTATIN (LIPITOR) 20 MG TABLET    Take 1 tablet (20 mg total) by mouth every evening.    BD INSULIN SYRINGE ULTRA-FINE 1/2 ML 30 GAUGE X 1/2" SYRG        BENZTROPINE (COGENTIN) 1 MG TABLET    Take 1 tablet by mouth at bedtime    BLOOD SUGAR DIAGNOSTIC STRP    Check blood glucose 4 times daily as directed and as needed (dispense insurance preferred brand or patient choice)    BLOOD-GLUCOSE METER MISC    Use to check blood sugars, give meter based on insurance specification    CYCLOBENZAPRINE (FLEXERIL) 10 MG TABLET    Take 1 tablet (10 mg total) by mouth 3 (three) times daily as needed for Muscle spasms.    CYCLOSPORINE (RESTASIS) 0.05 % OPHTHALMIC EMULSION    Place 0.4 mLs (1 drop total) into both eyes 2 (two) times daily.    DULOXETINE (CYMBALTA) 60 MG CAPSULE    Take 1 capsule by mouth twice daily    ERGOCALCIFEROL (ERGOCALCIFEROL) 50,000 UNIT CAP    Take 1 capsule (50,000 Units total) by mouth every 14 (fourteen) days.    FISH " OIL-OMEGA-3 FATTY ACIDS 300-1,000 MG CAPSULE    Take 1 capsule by mouth once daily.    FLUTICASONE PROPIONATE (FLONASE) 50 MCG/ACTUATION NASAL SPRAY    2 sprays (100 mcg total) by Each Nare route once daily.    FROVATRIPTAN (FROVA) 2.5 MG TABLET    Take one tablet orally. If recurs, may repeat after 2 hours. Max of 3 tabs in 24 hours.    FUROSEMIDE (LASIX) 20 MG TABLET    Take 1 tablet (20 mg total) by mouth once daily.    GABAPENTIN (NEURONTIN) 300 MG CAPSULE    Take 2 capsules (600 mg total) by mouth 3 (three) times daily.    HYDRALAZINE (APRESOLINE) 10 MG TABLET    Take 1 tablet (10 mg total) by mouth every 12 (twelve) hours.    INSULIN ASPART U-100 (NOVOLOG FLEXPEN U-100 INSULIN) 100 UNIT/ML (3 ML) INPN PEN    Inject before meals three times a day up to 30 units daily    LANCETS 28 GAUGE MISC    use as directed 3 times daily    LINZESS 145 MCG CAP CAPSULE    Take 145 mcg by mouth as needed.     LIRAGLUTIDE 0.6 MG/0.1 ML, 18 MG/3 ML, SUBQ PNIJ (VICTOZA 2-MILENA) 0.6 MG/0.1 ML (18 MG/3 ML) PNIJ    Use 1.8mg under the skin daily    LURASIDONE (LATUDA) 120 MG TAB    Take 1 tablet by mouth at 5 pm with food    MAGNESIUM OXIDE (MAG-OX) 400 MG (241.3 MG MAGNESIUM) TABLET    Take 1 tablet (400 mg total) by mouth once daily.    MECLIZINE (ANTIVERT) 25 MG TABLET    Take 1 tablet (25 mg total) by mouth 3 (three) times daily as needed.    METFORMIN (GLUCOPHAGE) 1000 MG TABLET    TAKE 1 TABLET (1,000 MG TOTAL) BY MOUTH 2 (TWO) TIMES DAILY.    METOPROLOL SUCCINATE (TOPROL-XL) 100 MG 24 HR TABLET    Take 2 tablets (200 mg total) by mouth every evening.    MIRTAZAPINE (REMERON) 45 MG TABLET    Take 1 tablet by mouth at bedtime    MOMETASONE (ASMANEX TWISTHALER) 220 MCG (120 DOSES) AEPB    Inhale 2 puffs into the lungs 2 (two) times daily. Controller    MULTIVIT,CALC,MINS/IRON/FOLIC (DAILY MULTIPLE FOR WOMEN ORAL)    Take 1 tablet by mouth once daily.    OXYCODONE-ACETAMINOPHEN (PERCOCET) 5-325 MG PER TABLET    Take 1 tablet by  "mouth every 8 (eight) hours as needed for Pain.    PEN NEEDLE, DIABETIC (BD ULTRA-FINE MINI PEN NEEDLE) 31 GAUGE X 3/16" NDLE    Use 5 a day    PROMETHAZINE (PHENERGAN) 25 MG TABLET    Take 1 tablet (25 mg total) by mouth every 4 (four) hours.    SPIRONOLACTONE (ALDACTONE) 25 MG TABLET    Take 1 tablet (25 mg total) by mouth once daily.    TOPIRAMATE (TOPAMAX) 50 MG TABLET    Take 1 tablet (50 mg total) by mouth 2 (two) times daily.    TOUJEO SOLOSTAR U-300 INSULIN 300 UNIT/ML (1.5 ML) INPN PEN    Inject 75 units daily    TRIAZOLAM (HALCION) 0.25 MG TAB    Take 1 tablet by mouth at bedtime    VALACYCLOVIR (VALTREX) 1000 MG TABLET    Take 1 tablet (1,000 mg total) by mouth once daily.    VALSARTAN (DIOVAN) 320 MG TABLET    Take 1 tablet (320 mg total) by mouth once daily.   Modified Medications    No medications on file   Discontinued Medications    No medications on file       Review of Systems   Constitutional: Negative.    HENT: Negative.    Eyes: Negative.    Respiratory: Negative.    Cardiovascular: Negative.    Gastrointestinal: Negative.    Genitourinary: Negative.    Musculoskeletal: Negative.    Skin: Negative.    Neurological: Negative.    Endo/Heme/Allergies: Negative.    Psychiatric/Behavioral: Negative.    All 12 systems otherwise negative.      Wt Readings from Last 3 Encounters:   01/02/20 92.7 kg (204 lb 5.9 oz)   12/30/19 92.4 kg (203 lb 11.3 oz)   12/30/19 92.9 kg (204 lb 12.9 oz)     Temp Readings from Last 3 Encounters:   12/30/19 98.8 °F (37.1 °C) (Tympanic)   12/29/19 99.2 °F (37.3 °C) (Oral)   12/28/19 98.6 °F (37 °C) (Oral)     BP Readings from Last 3 Encounters:   01/02/20 116/80   12/30/19 125/83   12/30/19 102/66     Pulse Readings from Last 3 Encounters:   01/02/20 78   12/30/19 89   12/30/19 94       /80 (BP Location: Left arm, Patient Position: Sitting, BP Method: Medium (Manual))   Pulse 78   Wt 92.7 kg (204 lb 5.9 oz)   BMI 45.82 kg/m²     Objective:   Physical Exam "   Constitutional: She is oriented to person, place, and time. She appears well-developed and well-nourished. No distress.   HENT:   Head: Normocephalic and atraumatic.   Nose: Nose normal.   Mouth/Throat: Oropharynx is clear and moist.   Eyes: Conjunctivae and EOM are normal. No scleral icterus.   Neck: Normal range of motion. Neck supple. No JVD present. No thyromegaly present.   Cardiovascular: Normal rate, regular rhythm, S1 normal and S2 normal. Exam reveals no gallop, no S3, no S4 and no friction rub.   No murmur heard.  Pulmonary/Chest: Effort normal and breath sounds normal. No stridor. No respiratory distress. She has no wheezes. She has no rales. She exhibits no tenderness.   Abdominal: Soft. Bowel sounds are normal. She exhibits no distension and no mass. There is no tenderness. There is no rebound.   Genitourinary:   Genitourinary Comments: Deferred   Musculoskeletal: Normal range of motion. She exhibits no edema, tenderness or deformity.   Lymphadenopathy:     She has no cervical adenopathy.   Neurological: She is alert and oriented to person, place, and time. She exhibits normal muscle tone. Coordination normal.   Skin: Skin is warm and dry. No rash noted. She is not diaphoretic. No erythema. No pallor.   Psychiatric: She has a normal mood and affect. Her behavior is normal. Judgment and thought content normal.   Nursing note and vitals reviewed.      Lab Results   Component Value Date     12/28/2019    K 4.0 12/28/2019    CL 98 12/28/2019    CO2 26 12/28/2019    BUN 12 12/28/2019    CREATININE 0.8 12/28/2019     (H) 12/28/2019    HGBA1C 9.9 (H) 11/18/2019    MG 1.6 11/27/2019    AST 38 12/28/2019    ALT 50 (H) 12/28/2019    ALBUMIN 3.8 12/28/2019    PROT 7.5 12/28/2019    BILITOT 0.3 12/28/2019    WBC 9.89 12/28/2019    HGB 11.6 (L) 12/28/2019    HCT 37.0 12/28/2019    MCV 88 12/28/2019     12/28/2019    INR 0.9 11/23/2016    TSH 1.855 12/22/2017    CHOL 142 04/25/2019    HDL 36 (L)  04/25/2019    LDLCALC 40.4 (L) 04/25/2019    TRIG 328 (H) 04/25/2019    BNP <10 10/07/2019     Assessment:      1. Preop cardiovascular exam    2. Essential hypertension    3. Hypertriglyceridemia    4. Hypertension complicating diabetes    5. Dyslipidemia associated with type 2 diabetes mellitus        Plan:   1. Pre-OP CV evaluation prior to  robotic cholecystectomy by Dr. Driscoll   Low periop risk of CV events for moderate risk procedure.  No chest pain, active arrhythmia and CHF symptoms.  Ok to proceed to the scheduled surgery without further cardiac study.  OK to hold Aspirin 7 days before the procedure and resume ASAP postop.  Continue Metoprolol and Statin periop.    2. HTN  - cont meds    3. HLD  - cont meds    4. DM2  - cont meds per PCP    5. Obesity  - needs weight loss    Thank you for allowing me to participate in this patient's care. Please do not hesitate to contact me with any questions or concerns. Consult note has been forwarded to the referral physician.

## 2020-01-02 NOTE — TELEPHONE ENCOUNTER
Dr. Driscoll's staff called to notify us that the anesthesiologist requires the patient be seen for pre-op clearance. Pt requested to be seen today and has been scheduled with Dr. García.     Would you like me to cancel the patient's appointment with Buffy Root on 1/6?

## 2020-01-02 NOTE — PROGRESS NOTES
Patient was discussed with Dr. Pfeiffer, anesthesiology.  During a tell foam preoperative visit the patient mentioned that she had chest pain and shortness of breath with exertion.  He recommends a formal cardiac evaluation before surgery.

## 2020-01-02 NOTE — TELEPHONE ENCOUNTER
Dr. Driscoll states that the patient is symptomatic, so the procedure is emergent. Would you be able to clear the pt? Or would you like me to schedule her follow up?

## 2020-01-02 NOTE — PRE-PROCEDURE INSTRUCTIONS
Informed Dr. Pfeiffer of pt's EKG results and pt states she is symptomatic with any activity, (per Dr. Driscoll's office visit of 12/16/19). Cardiac clearance ordered. Informed Dr. Driscoll's nurse, Gildardo, via phone call.

## 2020-01-02 NOTE — TELEPHONE ENCOUNTER
Pre op called this morning and stated that the patient needs a cardiac clearance  prior to surgery tomorrow, the cardiology  department was contacted along with the patient Re: appointment the patient was scheduled today for clearance.

## 2020-01-03 ENCOUNTER — HOSPITAL ENCOUNTER (OUTPATIENT)
Facility: HOSPITAL | Age: 50
Discharge: HOME | End: 2020-01-04
Attending: SURGERY | Admitting: SURGERY
Payer: MEDICAID

## 2020-01-03 ENCOUNTER — PATIENT MESSAGE (OUTPATIENT)
Dept: ENDOCRINOLOGY | Facility: CLINIC | Age: 50
End: 2020-01-03

## 2020-01-03 ENCOUNTER — ANESTHESIA (OUTPATIENT)
Dept: SURGERY | Facility: HOSPITAL | Age: 50
End: 2020-01-03
Payer: MEDICAID

## 2020-01-03 DIAGNOSIS — F31.9 BIPOLAR 1 DISORDER: Chronic | ICD-10-CM

## 2020-01-03 DIAGNOSIS — K21.9 GASTROESOPHAGEAL REFLUX DISEASE, ESOPHAGITIS PRESENCE NOT SPECIFIED: Primary | ICD-10-CM

## 2020-01-03 DIAGNOSIS — E11.65 TYPE 2 DIABETES MELLITUS WITH HYPERGLYCEMIA, WITH LONG-TERM CURRENT USE OF INSULIN: Chronic | ICD-10-CM

## 2020-01-03 DIAGNOSIS — E66.01 MORBID OBESITY WITH BMI OF 45.0-49.9, ADULT: Chronic | ICD-10-CM

## 2020-01-03 DIAGNOSIS — F25.0 SCHIZOAFFECTIVE DISORDER, BIPOLAR TYPE: Chronic | ICD-10-CM

## 2020-01-03 DIAGNOSIS — K80.20 CALCULUS OF GALLBLADDER WITHOUT CHOLECYSTITIS WITHOUT OBSTRUCTION: ICD-10-CM

## 2020-01-03 DIAGNOSIS — Z79.4 TYPE 2 DIABETES MELLITUS WITH HYPERGLYCEMIA, WITH LONG-TERM CURRENT USE OF INSULIN: Chronic | ICD-10-CM

## 2020-01-03 DIAGNOSIS — I10 ESSENTIAL HYPERTENSION: ICD-10-CM

## 2020-01-03 LAB
POCT GLUCOSE: 109 MG/DL (ref 70–110)
POCT GLUCOSE: 144 MG/DL (ref 70–110)
POCT GLUCOSE: 149 MG/DL (ref 70–110)

## 2020-01-03 PROCEDURE — 36000710: Performed by: SURGERY

## 2020-01-03 PROCEDURE — 25000003 PHARM REV CODE 250: Performed by: SURGERY

## 2020-01-03 PROCEDURE — 25000003 PHARM REV CODE 250: Performed by: NURSE ANESTHETIST, CERTIFIED REGISTERED

## 2020-01-03 PROCEDURE — 88304 TISSUE EXAM BY PATHOLOGIST: CPT | Mod: 26,,, | Performed by: PATHOLOGY

## 2020-01-03 PROCEDURE — 00790 ANES IPER UPR ABD NOS: CPT | Performed by: SURGERY

## 2020-01-03 PROCEDURE — 47562 PR LAP,CHOLECYSTECTOMY: ICD-10-PCS | Mod: ,,, | Performed by: SURGERY

## 2020-01-03 PROCEDURE — 99900035 HC TECH TIME PER 15 MIN (STAT)

## 2020-01-03 PROCEDURE — 27000221 HC OXYGEN, UP TO 24 HOURS

## 2020-01-03 PROCEDURE — 25000242 PHARM REV CODE 250 ALT 637 W/ HCPCS: Performed by: SURGERY

## 2020-01-03 PROCEDURE — 37000008 HC ANESTHESIA 1ST 15 MINUTES: Performed by: SURGERY

## 2020-01-03 PROCEDURE — 88304 TISSUE EXAM BY PATHOLOGIST: CPT | Performed by: PATHOLOGY

## 2020-01-03 PROCEDURE — 27201423 OPTIME MED/SURG SUP & DEVICES STERILE SUPPLY: Performed by: SURGERY

## 2020-01-03 PROCEDURE — 63600175 PHARM REV CODE 636 W HCPCS: Performed by: NURSE ANESTHETIST, CERTIFIED REGISTERED

## 2020-01-03 PROCEDURE — 37000009 HC ANESTHESIA EA ADD 15 MINS: Performed by: SURGERY

## 2020-01-03 PROCEDURE — 94799 UNLISTED PULMONARY SVC/PX: CPT

## 2020-01-03 PROCEDURE — 71000033 HC RECOVERY, INTIAL HOUR: Performed by: SURGERY

## 2020-01-03 PROCEDURE — 94640 AIRWAY INHALATION TREATMENT: CPT

## 2020-01-03 PROCEDURE — 47562 LAPAROSCOPIC CHOLECYSTECTOMY: CPT | Mod: ,,, | Performed by: SURGERY

## 2020-01-03 PROCEDURE — 63600175 PHARM REV CODE 636 W HCPCS: Performed by: SURGERY

## 2020-01-03 PROCEDURE — 36000711: Performed by: SURGERY

## 2020-01-03 PROCEDURE — 88304 PR  SURG PATH,LEVEL III: ICD-10-PCS | Mod: 26,,, | Performed by: PATHOLOGY

## 2020-01-03 PROCEDURE — 71000039 HC RECOVERY, EACH ADD'L HOUR: Performed by: SURGERY

## 2020-01-03 RX ORDER — FUROSEMIDE 20 MG/1
20 TABLET ORAL DAILY
Status: DISCONTINUED | OUTPATIENT
Start: 2020-01-03 | End: 2020-01-04 | Stop reason: HOSPADM

## 2020-01-03 RX ORDER — SPIRONOLACTONE 25 MG/1
25 TABLET ORAL DAILY
Status: DISCONTINUED | OUTPATIENT
Start: 2020-01-03 | End: 2020-01-04 | Stop reason: HOSPADM

## 2020-01-03 RX ORDER — PROPOFOL 10 MG/ML
VIAL (ML) INTRAVENOUS
Status: DISCONTINUED | OUTPATIENT
Start: 2020-01-03 | End: 2020-01-03

## 2020-01-03 RX ORDER — DIPHENHYDRAMINE HCL 25 MG
25 CAPSULE ORAL EVERY 6 HOURS PRN
Status: DISCONTINUED | OUTPATIENT
Start: 2020-01-03 | End: 2020-01-04 | Stop reason: HOSPADM

## 2020-01-03 RX ORDER — ALPRAZOLAM 1 MG/1
1 TABLET ORAL 3 TIMES DAILY PRN
Status: DISCONTINUED | OUTPATIENT
Start: 2020-01-03 | End: 2020-01-04 | Stop reason: HOSPADM

## 2020-01-03 RX ORDER — IBUPROFEN 200 MG
16 TABLET ORAL
Status: DISCONTINUED | OUTPATIENT
Start: 2020-01-03 | End: 2020-01-04 | Stop reason: HOSPADM

## 2020-01-03 RX ORDER — ONDANSETRON 2 MG/ML
4 INJECTION INTRAMUSCULAR; INTRAVENOUS DAILY PRN
Status: DISCONTINUED | OUTPATIENT
Start: 2020-01-03 | End: 2020-01-03 | Stop reason: HOSPADM

## 2020-01-03 RX ORDER — SODIUM CHLORIDE 9 MG/ML
INJECTION, SOLUTION INTRAVENOUS CONTINUOUS
Status: DISCONTINUED | OUTPATIENT
Start: 2020-01-03 | End: 2020-01-04

## 2020-01-03 RX ORDER — ARIPIPRAZOLE 5 MG/1
10 TABLET ORAL NIGHTLY
Status: DISCONTINUED | OUTPATIENT
Start: 2020-01-03 | End: 2020-01-04 | Stop reason: HOSPADM

## 2020-01-03 RX ORDER — KETOROLAC TROMETHAMINE 30 MG/ML
15 INJECTION, SOLUTION INTRAMUSCULAR; INTRAVENOUS EVERY 8 HOURS PRN
Status: DISCONTINUED | OUTPATIENT
Start: 2020-01-03 | End: 2020-01-03 | Stop reason: HOSPADM

## 2020-01-03 RX ORDER — GABAPENTIN 300 MG/1
600 CAPSULE ORAL 3 TIMES DAILY
Status: DISCONTINUED | OUTPATIENT
Start: 2020-01-03 | End: 2020-01-04 | Stop reason: HOSPADM

## 2020-01-03 RX ORDER — LIDOCAINE HYDROCHLORIDE 10 MG/ML
INJECTION, SOLUTION EPIDURAL; INFILTRATION; INTRACAUDAL; PERINEURAL
Status: DISCONTINUED | OUTPATIENT
Start: 2020-01-03 | End: 2020-01-03

## 2020-01-03 RX ORDER — METOPROLOL SUCCINATE 50 MG/1
200 TABLET, EXTENDED RELEASE ORAL NIGHTLY
Status: DISCONTINUED | OUTPATIENT
Start: 2020-01-03 | End: 2020-01-04 | Stop reason: HOSPADM

## 2020-01-03 RX ORDER — HYDROMORPHONE HYDROCHLORIDE 1 MG/ML
1 INJECTION, SOLUTION INTRAMUSCULAR; INTRAVENOUS; SUBCUTANEOUS EVERY 4 HOURS PRN
Status: DISCONTINUED | OUTPATIENT
Start: 2020-01-03 | End: 2020-01-04 | Stop reason: HOSPADM

## 2020-01-03 RX ORDER — INDOCYANINE GREEN AND WATER 25 MG
2.5 KIT INJECTION
Status: COMPLETED | OUTPATIENT
Start: 2020-01-03 | End: 2020-01-03

## 2020-01-03 RX ORDER — BENZTROPINE MESYLATE 1 MG/1
1 TABLET ORAL NIGHTLY
Status: DISCONTINUED | OUTPATIENT
Start: 2020-01-03 | End: 2020-01-04 | Stop reason: HOSPADM

## 2020-01-03 RX ORDER — GLUCAGON 1 MG
1 KIT INJECTION
Status: DISCONTINUED | OUTPATIENT
Start: 2020-01-03 | End: 2020-01-04 | Stop reason: HOSPADM

## 2020-01-03 RX ORDER — OXYCODONE AND ACETAMINOPHEN 7.5; 325 MG/1; MG/1
1 TABLET ORAL EVERY 4 HOURS PRN
Status: DISCONTINUED | OUTPATIENT
Start: 2020-01-03 | End: 2020-01-04 | Stop reason: HOSPADM

## 2020-01-03 RX ORDER — ACETAMINOPHEN 10 MG/ML
1000 INJECTION, SOLUTION INTRAVENOUS ONCE
Status: DISCONTINUED | OUTPATIENT
Start: 2020-01-03 | End: 2020-01-03 | Stop reason: HOSPADM

## 2020-01-03 RX ORDER — FENTANYL CITRATE 50 UG/ML
INJECTION, SOLUTION INTRAMUSCULAR; INTRAVENOUS
Status: DISCONTINUED | OUTPATIENT
Start: 2020-01-03 | End: 2020-01-03

## 2020-01-03 RX ORDER — HYDROMORPHONE HYDROCHLORIDE 2 MG/ML
0.2 INJECTION, SOLUTION INTRAMUSCULAR; INTRAVENOUS; SUBCUTANEOUS EVERY 5 MIN PRN
Status: DISCONTINUED | OUTPATIENT
Start: 2020-01-03 | End: 2020-01-03 | Stop reason: HOSPADM

## 2020-01-03 RX ORDER — NEOSTIGMINE METHYLSULFATE 1 MG/ML
INJECTION, SOLUTION INTRAVENOUS
Status: DISCONTINUED | OUTPATIENT
Start: 2020-01-03 | End: 2020-01-03

## 2020-01-03 RX ORDER — DEXTROSE MONOHYDRATE 100 MG/ML
12.5 INJECTION, SOLUTION INTRAVENOUS
Status: DISCONTINUED | OUTPATIENT
Start: 2020-01-03 | End: 2020-01-04 | Stop reason: HOSPADM

## 2020-01-03 RX ORDER — MIDAZOLAM HYDROCHLORIDE 1 MG/ML
INJECTION, SOLUTION INTRAMUSCULAR; INTRAVENOUS
Status: DISCONTINUED | OUTPATIENT
Start: 2020-01-03 | End: 2020-01-03

## 2020-01-03 RX ORDER — INSULIN ASPART 100 [IU]/ML
1-10 INJECTION, SOLUTION INTRAVENOUS; SUBCUTANEOUS
Status: DISCONTINUED | OUTPATIENT
Start: 2020-01-03 | End: 2020-01-04 | Stop reason: HOSPADM

## 2020-01-03 RX ORDER — CHLORHEXIDINE GLUCONATE ORAL RINSE 1.2 MG/ML
10 SOLUTION DENTAL 2 TIMES DAILY
Status: DISCONTINUED | OUTPATIENT
Start: 2020-01-03 | End: 2020-01-04 | Stop reason: HOSPADM

## 2020-01-03 RX ORDER — AMLODIPINE BESYLATE 10 MG/1
10 TABLET ORAL DAILY
Status: DISCONTINUED | OUTPATIENT
Start: 2020-01-03 | End: 2020-01-04 | Stop reason: HOSPADM

## 2020-01-03 RX ORDER — SODIUM CHLORIDE, SODIUM LACTATE, POTASSIUM CHLORIDE, CALCIUM CHLORIDE 600; 310; 30; 20 MG/100ML; MG/100ML; MG/100ML; MG/100ML
INJECTION, SOLUTION INTRAVENOUS CONTINUOUS
Status: DISCONTINUED | OUTPATIENT
Start: 2020-01-03 | End: 2020-01-04 | Stop reason: HOSPADM

## 2020-01-03 RX ORDER — IBUPROFEN 200 MG
24 TABLET ORAL
Status: DISCONTINUED | OUTPATIENT
Start: 2020-01-03 | End: 2020-01-04 | Stop reason: HOSPADM

## 2020-01-03 RX ORDER — TALC
6 POWDER (GRAM) TOPICAL NIGHTLY PRN
Status: DISCONTINUED | OUTPATIENT
Start: 2020-01-03 | End: 2020-01-04 | Stop reason: HOSPADM

## 2020-01-03 RX ORDER — HYDRALAZINE HYDROCHLORIDE 10 MG/1
10 TABLET, FILM COATED ORAL EVERY 12 HOURS
Status: DISCONTINUED | OUTPATIENT
Start: 2020-01-03 | End: 2020-01-04 | Stop reason: HOSPADM

## 2020-01-03 RX ORDER — BUPIVACAINE HYDROCHLORIDE 2.5 MG/ML
INJECTION, SOLUTION EPIDURAL; INFILTRATION; INTRACAUDAL
Status: DISCONTINUED | OUTPATIENT
Start: 2020-01-03 | End: 2020-01-03 | Stop reason: HOSPADM

## 2020-01-03 RX ORDER — TOPIRAMATE 25 MG/1
50 TABLET ORAL 2 TIMES DAILY
Status: DISCONTINUED | OUTPATIENT
Start: 2020-01-03 | End: 2020-01-04 | Stop reason: HOSPADM

## 2020-01-03 RX ORDER — DULOXETIN HYDROCHLORIDE 30 MG/1
60 CAPSULE, DELAYED RELEASE ORAL 2 TIMES DAILY
Status: DISCONTINUED | OUTPATIENT
Start: 2020-01-03 | End: 2020-01-04 | Stop reason: HOSPADM

## 2020-01-03 RX ORDER — ROCURONIUM BROMIDE 10 MG/ML
INJECTION, SOLUTION INTRAVENOUS
Status: DISCONTINUED | OUTPATIENT
Start: 2020-01-03 | End: 2020-01-03

## 2020-01-03 RX ORDER — SODIUM CHLORIDE, SODIUM LACTATE, POTASSIUM CHLORIDE, CALCIUM CHLORIDE 600; 310; 30; 20 MG/100ML; MG/100ML; MG/100ML; MG/100ML
INJECTION, SOLUTION INTRAVENOUS CONTINUOUS PRN
Status: DISCONTINUED | OUTPATIENT
Start: 2020-01-03 | End: 2020-01-03

## 2020-01-03 RX ORDER — GLYCOPYRROLATE 0.2 MG/ML
INJECTION INTRAMUSCULAR; INTRAVENOUS
Status: DISCONTINUED | OUTPATIENT
Start: 2020-01-03 | End: 2020-01-03

## 2020-01-03 RX ORDER — SIMETHICONE 80 MG
1 TABLET,CHEWABLE ORAL 4 TIMES DAILY PRN
Status: DISCONTINUED | OUTPATIENT
Start: 2020-01-03 | End: 2020-01-04 | Stop reason: HOSPADM

## 2020-01-03 RX ORDER — OXYCODONE AND ACETAMINOPHEN 5; 325 MG/1; MG/1
1 TABLET ORAL EVERY 6 HOURS PRN
Status: DISCONTINUED | OUTPATIENT
Start: 2020-01-03 | End: 2020-01-04 | Stop reason: HOSPADM

## 2020-01-03 RX ORDER — BUDESONIDE 0.5 MG/2ML
0.5 INHALANT ORAL EVERY 12 HOURS
Status: DISCONTINUED | OUTPATIENT
Start: 2020-01-03 | End: 2020-01-04 | Stop reason: HOSPADM

## 2020-01-03 RX ORDER — SUCCINYLCHOLINE CHLORIDE 20 MG/ML
INJECTION INTRAMUSCULAR; INTRAVENOUS
Status: DISCONTINUED | OUTPATIENT
Start: 2020-01-03 | End: 2020-01-03

## 2020-01-03 RX ORDER — DEXTROSE MONOHYDRATE 100 MG/ML
25 INJECTION, SOLUTION INTRAVENOUS
Status: DISCONTINUED | OUTPATIENT
Start: 2020-01-03 | End: 2020-01-04 | Stop reason: HOSPADM

## 2020-01-03 RX ORDER — ATORVASTATIN CALCIUM 10 MG/1
20 TABLET, FILM COATED ORAL NIGHTLY
Status: DISCONTINUED | OUTPATIENT
Start: 2020-01-03 | End: 2020-01-04 | Stop reason: HOSPADM

## 2020-01-03 RX ORDER — LOSARTAN POTASSIUM 50 MG/1
50 TABLET ORAL DAILY
Status: DISCONTINUED | OUTPATIENT
Start: 2020-01-03 | End: 2020-01-04 | Stop reason: HOSPADM

## 2020-01-03 RX ORDER — ONDANSETRON 8 MG/1
8 TABLET, ORALLY DISINTEGRATING ORAL EVERY 8 HOURS PRN
Status: DISCONTINUED | OUTPATIENT
Start: 2020-01-03 | End: 2020-01-04 | Stop reason: HOSPADM

## 2020-01-03 RX ORDER — ONDANSETRON 2 MG/ML
INJECTION INTRAMUSCULAR; INTRAVENOUS
Status: DISCONTINUED | OUTPATIENT
Start: 2020-01-03 | End: 2020-01-03

## 2020-01-03 RX ADMIN — CEFAZOLIN SODIUM 100 ML: 2 SOLUTION INTRAVENOUS at 08:01

## 2020-01-03 RX ADMIN — GABAPENTIN 600 MG: 300 CAPSULE ORAL at 09:01

## 2020-01-03 RX ADMIN — OXYCODONE HYDROCHLORIDE AND ACETAMINOPHEN 1 TABLET: 7.5; 325 TABLET ORAL at 12:01

## 2020-01-03 RX ADMIN — SODIUM CHLORIDE, SODIUM LACTATE, POTASSIUM CHLORIDE, AND CALCIUM CHLORIDE: .6; .31; .03; .02 INJECTION, SOLUTION INTRAVENOUS at 11:01

## 2020-01-03 RX ADMIN — CHLORHEXIDINE GLUCONATE 0.12% ORAL RINSE 10 ML: 1.2 LIQUID ORAL at 11:01

## 2020-01-03 RX ADMIN — INDOCYANINE GREEN 2.5 MG: KIT INTRAVENOUS at 08:01

## 2020-01-03 RX ADMIN — ATORVASTATIN CALCIUM 20 MG: 10 TABLET, FILM COATED ORAL at 09:01

## 2020-01-03 RX ADMIN — HYDRALAZINE HYDROCHLORIDE 10 MG: 10 TABLET, FILM COATED ORAL at 09:01

## 2020-01-03 RX ADMIN — FUROSEMIDE 20 MG: 20 TABLET ORAL at 12:01

## 2020-01-03 RX ADMIN — HYDROMORPHONE HYDROCHLORIDE 1 MG: 1 INJECTION, SOLUTION INTRAMUSCULAR; INTRAVENOUS; SUBCUTANEOUS at 04:01

## 2020-01-03 RX ADMIN — GABAPENTIN 600 MG: 300 CAPSULE ORAL at 03:01

## 2020-01-03 RX ADMIN — BENZTROPINE MESYLATE 1 MG: 1 TABLET ORAL at 09:01

## 2020-01-03 RX ADMIN — ROBINUL 0.8 MG: 0.2 INJECTION INTRAMUSCULAR; INTRAVENOUS at 09:01

## 2020-01-03 RX ADMIN — DULOXETINE HYDROCHLORIDE 60 MG: 30 CAPSULE, DELAYED RELEASE ORAL at 09:01

## 2020-01-03 RX ADMIN — METOPROLOL SUCCINATE 200 MG: 50 TABLET, EXTENDED RELEASE ORAL at 09:01

## 2020-01-03 RX ADMIN — MIDAZOLAM 2 MG: 1 INJECTION INTRAMUSCULAR; INTRAVENOUS at 08:01

## 2020-01-03 RX ADMIN — PROPOFOL 200 MG: 10 INJECTION, EMULSION INTRAVENOUS at 08:01

## 2020-01-03 RX ADMIN — NEOSTIGMINE METHYLSULFATE 5 MG: 1 INJECTION INTRAVENOUS at 09:01

## 2020-01-03 RX ADMIN — ROCURONIUM BROMIDE 5 MG: 10 INJECTION, SOLUTION INTRAVENOUS at 08:01

## 2020-01-03 RX ADMIN — SIMETHICONE CHEW TAB 80 MG 80 MG: 80 TABLET ORAL at 01:01

## 2020-01-03 RX ADMIN — SODIUM CHLORIDE, SODIUM LACTATE, POTASSIUM CHLORIDE, AND CALCIUM CHLORIDE: 600; 310; 30; 20 INJECTION, SOLUTION INTRAVENOUS at 08:01

## 2020-01-03 RX ADMIN — SUGAMMADEX 200 MG: 100 INJECTION, SOLUTION INTRAVENOUS at 09:01

## 2020-01-03 RX ADMIN — ROCURONIUM BROMIDE 30 MG: 10 INJECTION, SOLUTION INTRAVENOUS at 08:01

## 2020-01-03 RX ADMIN — LIDOCAINE HYDROCHLORIDE 40 MG: 10 INJECTION, SOLUTION EPIDURAL; INFILTRATION; INTRACAUDAL; PERINEURAL at 08:01

## 2020-01-03 RX ADMIN — CHLORHEXIDINE GLUCONATE 0.12% ORAL RINSE 10 ML: 1.2 LIQUID ORAL at 09:01

## 2020-01-03 RX ADMIN — ROBINUL 0.2 MG: 0.2 INJECTION INTRAMUSCULAR; INTRAVENOUS at 08:01

## 2020-01-03 RX ADMIN — BUDESONIDE 0.5 MG: 0.5 SUSPENSION RESPIRATORY (INHALATION) at 07:01

## 2020-01-03 RX ADMIN — TOPIRAMATE 50 MG: 25 TABLET, FILM COATED ORAL at 09:01

## 2020-01-03 RX ADMIN — FENTANYL CITRATE 250 MCG: 50 INJECTION, SOLUTION INTRAMUSCULAR; INTRAVENOUS at 08:01

## 2020-01-03 RX ADMIN — ONDANSETRON 4 MG: 2 INJECTION, SOLUTION INTRAMUSCULAR; INTRAVENOUS at 08:01

## 2020-01-03 RX ADMIN — ARIPIPRAZOLE 10 MG: 5 TABLET ORAL at 09:01

## 2020-01-03 RX ADMIN — SUCCINYLCHOLINE CHLORIDE 120 MG: 20 INJECTION, SOLUTION INTRAMUSCULAR; INTRAVENOUS at 08:01

## 2020-01-03 RX ADMIN — ONDANSETRON 8 MG: 8 TABLET, ORALLY DISINTEGRATING ORAL at 01:01

## 2020-01-03 NOTE — OP NOTE
[facility]  Surgery Department  Operative Note    SUMMARY     Date of Procedure: 1/3/2020     Procedure: Procedure(s) (LRB):  XI ROBOTIC CHOLECYSTECTOMY (N/A)     Surgeon(s) and Role:     * Damir Driscoll MD - Primary    Assisting Surgeon: None    Pre-Operative Diagnosis: Calculus of gallbladder without cholecystitis without obstruction [K80.20]    Post-Operative Diagnosis: Post-Op Diagnosis Codes:     * Calculus of gallbladder without cholecystitis without obstruction [K80.20]    Anesthesia: General    Technical Procedures Used:     Robotic assisted cholecystectomy    Description of the Findings of the Procedure:     Findings a minimally inflamed gallbladder with stones      The patient was brought into the operating room and placed on the operating table in the supine position.  General endotracheal anesthesia was induced.  IV antibiotics were administered.  Pneumatic compression devices were placed in the lower extremities.  Arms were tucked at the side.  The abdomen was clipped, prepped and draped in the standard manner.   Indocyanine green was administered    A timeout was performed.    A small transverse incision was made just above the umbilicus.  The fascia was identified and elevated with Quinton clamps.  A varies needle was inserted and its position confirmed by saline drop test.  Pneumoperitoneum was established to 15 mmHg.  A 8 mm robotic trocar was inserted.  The laparoscope was inserted.  There was no evidence of a vascular or enteric injury.      The liver appeared fatty infiltrated    Additional trochars were placed as follows an 8 mm robotic trocar was placed in the midclavicular line in the left midabdomen.  An 8 mm trocar was placed in the midclavicular line of the right midabdomen.  An 8 mm trocar was placed in the anterior axillary line of the right midabdomen.  The patient was placed in reverse Trendelenburg position and rolled to the left.  The patient was docked to the da  Sharonda robot.    The fundus of the gallbladder was retracted cephalad.  The infundibulum was retracted laterally.  The fire fly technology of the robot was activated the common bile duct could not be seen due to the patient's obesity    The proximal cystic duct was also visualized     Peritoneum over the neck of the gallbladder was taken down with the hook cautery and the cystic duct was dissected circumferentially.  The cystic artery was then dissected circumferentially and the neck of the gallbladder was then dissected off the gallbladder bed.  This cleared Hiram triangle of all loose areolar tissue and the critical view was obtained.    The cystic duct was clipped with 2 clips proximally 1 clip distally and transected.  The cystic artery was clipped with 1 clips proximally 1 clip distally and transected.  The hook cautery was then used to remove the gallbladder from the gallbladder bed.  A posterior cystic artery was clipped proximally and distally, it was then divided.    The gallbladder was nearly excised from the gallbladder bed, the gallbladder bed was inspected and hemostasis was ensured using electrocautery.  Removal of the gallbladder was then completed.    The right lateral operating arm of the robot was then removed and an Endo Catch bag was inserted.  The gallbladder was placed in Endo Catch bag.  The patient was then undocked from the da Sharonda operating robot.    The gallbladder was extracted through the right lateral incision.  .  The trochars were removed under direct vision and no bleeding was noted.  The abdomen was then desufflated.  Marcaine was infiltrated.  All incisions were closed with 4-0 Monocryl in a subcuticular manner.  Dermaflex was placed      Significant Surgical Tasks Conducted by the Assistant(s), if Applicable:  None    Complications: No    Estimated Blood Loss (EBL): 10 mL  IV fluids 900 mL  Marcaine 0.25% 30 mL           Implants: * No implants in log *    Specimens:   Specimen  (12h ago, onward)    None                  Condition: Stable    Disposition: PACU - hemodynamically stable.    Attestation: I performed the procedure.

## 2020-01-03 NOTE — PLAN OF CARE
Fall precautions maintained. Pt free from injuries/fall.  Repositions and ambulates with stand by assist.   C/oabd pain, PRN meds available.  Bed locked and low, side rails up x 2, phone and call light w/in reach.   POC and meds discussed, pt verbalized understanding.   Chart check done. Will cont to monitor.

## 2020-01-03 NOTE — TRANSFER OF CARE
"Anesthesia Transfer of Care Note    Patient: Yolanda Betts    Procedure(s) Performed: Procedure(s) (LRB):  XI ROBOTIC CHOLECYSTECTOMY (N/A)    Patient location: PACU    Anesthesia Type: general    Transport from OR: Transported from OR on room air with adequate spontaneous ventilation    Post pain: adequate analgesia    Post assessment: no apparent anesthetic complications    Post vital signs: stable    Level of consciousness: awake    Nausea/Vomiting: no nausea/vomiting    Complications: none    Transfer of care protocol was followed      Last vitals:   Visit Vitals  /71   Pulse 83   Temp 36.5 °C (97.7 °F) (Temporal)   Resp 18   Ht 4' 8" (1.422 m)   Wt 91.2 kg (201 lb 1 oz)   SpO2 95%   Breastfeeding? No   BMI 45.08 kg/m²     "

## 2020-01-03 NOTE — ANESTHESIA POSTPROCEDURE EVALUATION
Anesthesia Post Evaluation    Patient: Yolanda Betts    Procedure(s) Performed: Procedure(s) (LRB):  XI ROBOTIC CHOLECYSTECTOMY (N/A)    Final Anesthesia Type: general    Patient location during evaluation: PACU  Patient participation: Yes- Able to Participate  Level of consciousness: awake and alert  Post-procedure vital signs: reviewed and stable  Pain management: adequate  Airway patency: patent    PONV status at discharge: No PONV  Anesthetic complications: no      Cardiovascular status: hemodynamically stable  Respiratory status: spontaneous ventilation  Hydration status: euvolemic  Follow-up not needed.          Vitals Value Taken Time   /80 1/3/2020 11:24 AM   Temp 36.7 °C (98 °F) 1/3/2020 11:24 AM   Pulse 79 1/3/2020 11:24 AM   Resp 18 1/3/2020 11:24 AM   SpO2 94 % 1/3/2020 11:24 AM         Event Time     Out of Recovery 11:06:18          Pain/Elif Score: Elif Score: 8 (1/3/2020 11:00 AM)

## 2020-01-03 NOTE — ANESTHESIA PREPROCEDURE EVALUATION
01/03/2020  Yolanda Betts is a 49 y.o., female.    Anesthesia Evaluation    I have reviewed the Patient Summary Reports.    I have reviewed the Nursing Notes.   I have reviewed the Medications.     Review of Systems  Anesthesia Hx:  No problems with previous Anesthesia    Social:  Non-Smoker    Hematology/Oncology:  Hematology Normal        Cardiovascular:   Hypertension Pt seen and cleared by Cards with moderate risk.   Pulmonary:   Asthma Sleep Apnea Uses oxygen at night.   Renal/:  Renal/ Normal     Hepatic/GI:   GERD    Neurological:   Neuromuscular Disease, Headaches    Endocrine:   Diabetes    Psych:   Psychiatric History          Physical Exam  General:  Morbid Obesity    Airway/Jaw/Neck:  Airway Findings: Mouth Opening: Normal Tongue: Normal  General Airway Assessment: Adult  Mallampati: II  TM Distance: 4 - 6 cm  Jaw/Neck Findings:  Neck ROM: Normal ROM      Dental:  Dental Findings: In tact   Chest/Lungs:  Chest/Lungs Findings: Clear to auscultation, Normal Respiratory Rate     Heart/Vascular:  Heart Findings: Rate: Normal  Rhythm: Regular Rhythm  Sounds: Normal        Mental Status:  Mental Status Findings:  Cooperative, Alert and Oriented         Anesthesia Plan  Type of Anesthesia, risks & benefits discussed:  Anesthesia Type:  general  Patient's Preference:   Intra-op Monitoring Plan: standard ASA monitors  Intra-op Monitoring Plan Comments:   Post Op Pain Control Plan: multimodal analgesia and per primary service following discharge from PACU  Post Op Pain Control Plan Comments:   Induction:   IV  Beta Blocker:  Patient is on a Beta-Blocker and has received one dose within the past 24 hours (No further documentation required).       Informed Consent: Patient understands risks and agrees with Anesthesia plan.  Questions answered. Anesthesia consent signed with patient.  ASA Score: 3      Day of Surgery Review of History & Physical:  There are no significant changes.          Ready For Surgery From Anesthesia Perspective.     Patient Active Problem List   Diagnosis    Essential hypertension    GERD (gastroesophageal reflux disease)    Allergic rhinitis    Menopausal syndrome (hot flashes)    Migraine with aura and without status migrainosus, not intractable    Abnormal ECG    Psychophysiological insomnia    Sleep-related bruxism    Nightmares    Sleepwalking    Diabetic polyneuropathy associated with type 2 diabetes mellitus    Mild persistent asthma without complication    Bipolar 1 disorder    Anemia    Diffusion capacity of lung (dl), decreased    Hypertriglyceridemia    Anxiety    Type 2 diabetes mellitus with hyperglycemia, with long-term current use of insulin    Iron deficiency anemia    Screen for colon cancer    Schizoaffective disorder, bipolar type    Vitamin D deficiency    Fibromyalgia    Hypertension complicating diabetes    Dyslipidemia associated with type 2 diabetes mellitus    Seasonal allergic rhinitis due to pollen    Nocturnal hypoxemia due to obesity - WITHOUT ALEN    Obesity hypoventilation syndrome    Hyperaldosteronism    Morbid obesity with BMI of 45.0-49.9, adult       Chemistry        Component Value Date/Time     01/02/2020 1511    K 4.2 01/02/2020 1511     01/02/2020 1511    CO2 28 01/02/2020 1511    BUN 9 01/02/2020 1511    CREATININE 0.7 01/02/2020 1511     (H) 01/02/2020 1511        Component Value Date/Time    CALCIUM 9.7 01/02/2020 1511    ALKPHOS 82 12/28/2019 1545    AST 38 12/28/2019 1545    ALT 50 (H) 12/28/2019 1545    BILITOT 0.3 12/28/2019 1545    ESTGFRAFRICA >60 01/02/2020 1511    EGFRNONAA >60 01/02/2020 1511        Lab Results   Component Value Date    WBC 9.89 12/28/2019    HGB 11.6 (L) 12/28/2019    HCT 37.0 12/28/2019    MCV 88 12/28/2019     12/28/2019       Sinus tachycardia  Minimal voltage  criteria for LVH, may be normal variant  Anterolateral infarct ,age undetermined  Abnormal ECG  When compared with ECG of 04-OCT-2019 19:06,  No significant change was found  10/7/2019 6:38:37 PM      Echo 06/06/2019 19:13  CONCLUSIONS     1 - Concentric hypertrophy.     2 - No wall motion abnormalities.     3 - Normal left ventricular systolic function (EF 60-65%).     PFT's:  Result Notes for Complete PFT without bronchodilator - Clinic     Notes recorded by Sylvain Rainey MD on 7/8/2019 at 3:23 PM CDT  Normal spirometry. (FEV1/VC greater than or equal to LLN and FVC greater than or equal to LLN)   Normal airflow. (FEV1/VC greater than or equal to LLN)   Normal lung volumes. (TLC > or equal to LLN)   Maximum voluntary ventilation is moderately reduced. (MVV% predicted is 51% to 65% of predicted)   Mild reduction in diffusion capacity -unadjusted for hemoglobin (DLCO > or equal to 60% predicted and < LLN) .     Corrected for alveolar volume diffusion capacity was normal.         4 - Impaired LV relaxation, normal LAP (grade 1 diastolic dysfunction).     5 - Normal right ventricular systolic function .     Stress: 12/07/2016  Impression: NORMAL MYOCARDIAL PERFUSION  1. The perfusion scan is free of evidence for myocardial ischemia or injury.   2. Resting wall motion is physiologic.   3. Resting LV function is normal.   4. The ventricular volumes are normal at rest and stress.   5. The extracardiac distribution of radioactivity is normal.     Result Notes for Complete PFT without bronchodilator - Clinic     Notes recorded by Sylvain Rainey MD on 7/8/2019 at 3:23 PM CDT  Normal spirometry. (FEV1/VC greater than or equal to LLN and FVC greater than or equal to LLN)   Normal airflow. (FEV1/VC greater than or equal to LLN)   Normal lung volumes. (TLC > or equal to LLN)   Maximum voluntary ventilation is moderately reduced. (MVV% predicted is 51% to 65% of predicted)   Mild reduction in diffusion capacity  -unadjusted for hemoglobin (DLCO > or equal to 60% predicted and < LLN) .     Corrected for alveolar volume diffusion capacity was normal.

## 2020-01-03 NOTE — INTERVAL H&P NOTE
The patient has been examined and the H&P has been reviewed:    I concur with the findings and changes have been noted since the H&P was written: Seen by Cardiology, low risk from a cardiac standpoint    Anesthesia/Surgery risks, benefits and alternative options discussed and understood by patient/family.          Active Hospital Problems    Diagnosis  POA    Essential hypertension [I10]  Yes     Chronic      Resolved Hospital Problems   No resolved problems to display.

## 2020-01-04 VITALS
RESPIRATION RATE: 20 BRPM | TEMPERATURE: 98 F | OXYGEN SATURATION: 94 % | HEART RATE: 76 BPM | WEIGHT: 201.06 LBS | HEIGHT: 56 IN | SYSTOLIC BLOOD PRESSURE: 103 MMHG | DIASTOLIC BLOOD PRESSURE: 56 MMHG | BODY MASS INDEX: 45.23 KG/M2

## 2020-01-04 LAB
POCT GLUCOSE: 126 MG/DL (ref 70–110)
POCT GLUCOSE: 153 MG/DL (ref 70–110)

## 2020-01-04 PROCEDURE — 94640 AIRWAY INHALATION TREATMENT: CPT

## 2020-01-04 PROCEDURE — 25000242 PHARM REV CODE 250 ALT 637 W/ HCPCS: Performed by: SURGERY

## 2020-01-04 PROCEDURE — 27000221 HC OXYGEN, UP TO 24 HOURS

## 2020-01-04 PROCEDURE — 94761 N-INVAS EAR/PLS OXIMETRY MLT: CPT

## 2020-01-04 PROCEDURE — 99900035 HC TECH TIME PER 15 MIN (STAT)

## 2020-01-04 PROCEDURE — 94799 UNLISTED PULMONARY SVC/PX: CPT

## 2020-01-04 PROCEDURE — 25000003 PHARM REV CODE 250: Performed by: SURGERY

## 2020-01-04 RX ORDER — ARIPIPRAZOLE 10 MG/1
10 TABLET ORAL NIGHTLY
Qty: 30 TABLET | Refills: 11 | Status: SHIPPED | OUTPATIENT
Start: 2020-01-04 | End: 2020-02-04

## 2020-01-04 RX ORDER — BENZTROPINE MESYLATE 1 MG/1
1 TABLET ORAL NIGHTLY
Qty: 30 TABLET | Refills: 11 | Status: SHIPPED | OUTPATIENT
Start: 2020-01-04 | End: 2020-02-04

## 2020-01-04 RX ORDER — OXYCODONE AND ACETAMINOPHEN 5; 325 MG/1; MG/1
1 TABLET ORAL EVERY 6 HOURS PRN
Qty: 20 TABLET | Refills: 0 | Status: SHIPPED | OUTPATIENT
Start: 2020-01-04 | End: 2020-02-04

## 2020-01-04 RX ORDER — DULOXETIN HYDROCHLORIDE 60 MG/1
60 CAPSULE, DELAYED RELEASE ORAL 2 TIMES DAILY
Qty: 60 CAPSULE | Refills: 11 | Status: SHIPPED | OUTPATIENT
Start: 2020-01-04 | End: 2020-04-17

## 2020-01-04 RX ORDER — BENZTROPINE MESYLATE 1 MG/1
1 TABLET ORAL NIGHTLY
Start: 2020-01-04 | End: 2020-02-04

## 2020-01-04 RX ADMIN — LOSARTAN POTASSIUM 50 MG: 50 TABLET ORAL at 09:01

## 2020-01-04 RX ADMIN — SPIRONOLACTONE 25 MG: 25 TABLET ORAL at 09:01

## 2020-01-04 RX ADMIN — HYDRALAZINE HYDROCHLORIDE 10 MG: 10 TABLET, FILM COATED ORAL at 09:01

## 2020-01-04 RX ADMIN — OXYCODONE HYDROCHLORIDE AND ACETAMINOPHEN 1 TABLET: 7.5; 325 TABLET ORAL at 11:01

## 2020-01-04 RX ADMIN — OXYCODONE HYDROCHLORIDE AND ACETAMINOPHEN 1 TABLET: 7.5; 325 TABLET ORAL at 05:01

## 2020-01-04 RX ADMIN — DULOXETINE HYDROCHLORIDE 60 MG: 30 CAPSULE, DELAYED RELEASE ORAL at 09:01

## 2020-01-04 RX ADMIN — BUDESONIDE 0.5 MG: 0.5 SUSPENSION RESPIRATORY (INHALATION) at 07:01

## 2020-01-04 RX ADMIN — FUROSEMIDE 20 MG: 20 TABLET ORAL at 09:01

## 2020-01-04 RX ADMIN — TOPIRAMATE 50 MG: 25 TABLET, FILM COATED ORAL at 09:01

## 2020-01-04 RX ADMIN — GABAPENTIN 600 MG: 300 CAPSULE ORAL at 09:01

## 2020-01-04 RX ADMIN — ONDANSETRON 8 MG: 8 TABLET, ORALLY DISINTEGRATING ORAL at 10:01

## 2020-01-04 RX ADMIN — AMLODIPINE BESYLATE 10 MG: 10 TABLET ORAL at 09:01

## 2020-01-04 RX ADMIN — CHLORHEXIDINE GLUCONATE 0.12% ORAL RINSE 10 ML: 1.2 LIQUID ORAL at 09:01

## 2020-01-04 NOTE — PLAN OF CARE
Fall precautions implemented. Pt remained free from falls/injuries.  IVF infusing per orders. Tolerating clear liquids no c/o nausea/vomiting. Blood glucose monitored using SSI.  4 lap sites CDI. Pts abd rounded and firm in upper quads. Pt admits to having gas pains educated pt on the need to ambulate and encouraged pt to walk but she refused and states she will walk in the morning. Pain adequately controlled. Safety precautions implemented. Chart reviewed. Will continue to monitor.

## 2020-01-04 NOTE — PLAN OF CARE
Shey bond to 4 lap sites to ABD. Accu checks QID. Clear liquid diet, tolerates well. Diet advancement for lunch. Encouraged ambulation. Remains free from injury/incident. Call light in reach.

## 2020-01-04 NOTE — ASSESSMENT & PLAN NOTE
Postop day 1 from a robotic cholecystectomy, doing well.  Diabetic diet as tolerated discharge home

## 2020-01-04 NOTE — PROGRESS NOTES
Patient called with a fever of 102.9. She is postop day one from [place] for courses text me. I asked her to take Tylenol and dBrief. But the fever did not resolve she was instructed to go to the emergency room

## 2020-01-04 NOTE — DISCHARGE SUMMARY
[facility] -   General Surgery  Discharge Summary      Patient Name: Yolanda Betts  MRN: [id number]  Admission Date: 1/3/2020  Hospital Length of Stay: 0 days  Discharge Date and Time:  01/04/2020 9:35 AM  Attending Physician: Damir Driscoll MD   Discharging Provider: Damir Driscoll MD  Primary Care Provider: CHARLA Beach MD    HPI:   No notes on file    Procedure(s) (LRB):  XI ROBOTIC CHOLECYSTECTOMY (N/A)      Indwelling Lines/Drains at time of discharge:   Lines/Drains/Airways     None               Hospital Course: Patient reports a history of abdominal pain. She describes the abdominal pain as being in the middle and right and left lower quadrants.  She describes this pain is spasm like.  It can occur after eating and it can be sharp at times.     The patient also describes a right upper quadrant pain that she says is sharp.  She also says the pain can be intermittent are constant.  The pain may occur after eating food.  It may also occur intermittently.  She does not describe any nausea vomiting constipation or diarrhea        The patient states that she gets chest pain and shortness of breath with any activity or with an anxiety attack.     The chest pain was evaluated by a stress test in January, Care everywhere section, which was negative.     Pulmonary function test show mild diffusion capacity decreases.     The patient is also concerned that her abdomen is protuberant.  She states this may be related to distension.  She also reports a bulge of the abdominal wall on the left side.  She is concerned that she has a hernia.    She presents for cholecystectomy.  She has been seen by Cardiology and there is low cardiac risk    Consults:     Significant Diagnostic Studies: none    Pending Diagnostic Studies:     Procedure Component Value Units Date/Time    Specimen to Pathology, Surgery General Surgery [[id number]] Collected:  01/03/20 0903    Order Status:  Sent Lab Status:  In  process Updated:  01/03/20 0937        Final Active Diagnoses:    Diagnosis Date Noted POA    PRINCIPAL PROBLEM:  Calculus of gallbladder without cholecystitis without obstruction [K80.20] 01/03/2020 Yes    Cholelithiasis [K80.20] 01/03/2020 Yes    Morbid obesity with BMI of 45.0-49.9, adult [E66.01, Z68.42] 12/30/2019 Not Applicable     Chronic    Schizoaffective disorder, bipolar type [F25.0] 04/25/2019 Yes     Chronic    Type 2 diabetes mellitus with hyperglycemia, with long-term current use of insulin [E11.65, Z79.4] 12/21/2017 Not Applicable     Chronic    Anxiety [F41.9] 08/03/2017 Yes    Bipolar 1 disorder [F31.9] 01/31/2017 Yes     Chronic    Essential hypertension [I10] 03/21/2016 Yes     Chronic    GERD (gastroesophageal reflux disease) [K21.9] 03/21/2016 Yes      Problems Resolved During this Admission:      Discharged Condition: stable    Disposition: Home or Self Care    Follow Up:  Follow-up Information     Damir Driscoll MD In 3 weeks.    Specialty:  General Surgery  Why:  post op appt for as scheduled  Contact information:  [address]  [place] [address]  [phone]                 Patient Instructions:      Diet diabetic     Lifting restrictions   Order Comments: No lifting more than 30 pounds for 2 weeks     Notify your health care provider if you experience any of the following:  temperature >100.4     Notify your health care provider if you experience any of the following:  persistent nausea and vomiting or diarrhea     Notify your health care provider if you experience any of the following:  severe uncontrolled pain     No dressing needed   Order Comments: May shower     Medications:  Reconciled Home Medications:      Medication List      START taking these medications    * ARIPiprazole 10 MG Tab  Commonly known as:  Abilify  Take one tablet by mouth once daily     * ARIPiprazole 10 MG Tab  Commonly known as:  ABILIFY  Take 1 tablet (10 mg total) by mouth every  evening.     * benztropine 1 MG tablet  Commonly known as:  COGENTIN  Take one tablet by mouth at bedtime     * benztropine 1 MG tablet  Commonly known as:  COGENTIN  Take 1 tablet (1 mg total) by mouth nightly.     * benztropine 1 MG tablet  Commonly known as:  COGENTIN  Take 1 tablet (1 mg total) by mouth nightly.     nozaseptin  nasal   Commonly known as:  NOZIN  1 each by Each Nostril route 2 (two) times daily. for 7 days         * This list has 5 medication(s) that are the same as other medications prescribed for you. Read the directions carefully, and ask your doctor or other care provider to review them with you.            CHANGE how you take these medications    * DULoxetine 60 MG capsule  Commonly known as:  CYMBALTA  Take 1 capsule by mouth twice daily  What changed:    · how much to take  · when to take this     * DULoxetine 60 MG capsule  Commonly known as:  CYMBALTA  Take one capsule by mouth twice daily  What changed:  You were already taking a medication with the same name, and this prescription was added. Make sure you understand how and when to take each.     * DULoxetine 60 MG capsule  Commonly known as:  CYMBALTA  Take 1 capsule (60 mg total) by mouth 2 (two) times daily.  What changed:  You were already taking a medication with the same name, and this prescription was added. Make sure you understand how and when to take each.     fluticasone propionate 50 mcg/actuation nasal spray  Commonly known as:  FLONASE  2 sprays (100 mcg total) by Each Nare route once daily.  What changed:    · when to take this  · reasons to take this     * lurasidone 120 mg Tab  Commonly known as:  Latuda  Take 1 tablet by mouth at 5 pm with food  What changed:    · how much to take  · how to take this  · when to take this     * Latuda 120 mg Tab  Generic drug:  lurasidone  Take one tablet by mouth daily at 5:00pm with food  What changed:  You were already taking a medication with the same name, and this  prescription was added. Make sure you understand how and when to take each.     mirtazapine 45 MG tablet  Commonly known as:  REMERON  Take one tablet by mouth at bedtime  What changed:    · how much to take  · how to take this  · when to take this     NovoLOG Flexpen U-100 Insulin 100 unit/mL (3 mL) Inpn pen  Generic drug:  insulin aspart U-100  Inject before meals three times a day up to 30 units daily  What changed:    · how to take this  · when to take this     * oxyCODONE-acetaminophen 5-325 mg per tablet  Commonly known as:  PERCOCET  Take 1 tablet by mouth every 8 (eight) hours as needed for Pain.  What changed:  Another medication with the same name was added. Make sure you understand how and when to take each.     * oxyCODONE-acetaminophen 5-325 mg per tablet  Commonly known as:  PERCOCET  Take 1 tablet by mouth every 6 (six) hours as needed (Pain).  What changed:  You were already taking a medication with the same name, and this prescription was added. Make sure you understand how and when to take each.     * promethazine 25 MG tablet  Commonly known as:  PHENERGAN  Take 1 tablet (25 mg total) by mouth every 4 (four) hours.  What changed:  Another medication with the same name was added. Make sure you understand how and when to take each.     * promethazine 25 MG tablet  Commonly known as:  PHENERGAN  Take one tablet by mouth every 8 hours as needed for nausea (sedating)  What changed:  You were already taking a medication with the same name, and this prescription was added. Make sure you understand how and when to take each.     Toujeo SoloStar U-300 Insulin 300 unit/mL (1.5 mL) Inpn pen  Generic drug:  insulin glargine (TOUJEO)  Inject 75 units daily  What changed:    · how much to take  · how to take this  · when to take this     * triazolam 0.25 MG Tab  Commonly known as:  HALCION  Take 1 tablet by mouth at bedtime  What changed:    · how much to take  · how to take this  · when to take this     *  "triazolam 0.25 MG Tab  Commonly known as:  HALCION  Take one tablet by mouth at bedtime  What changed:  You were already taking a medication with the same name, and this prescription was added. Make sure you understand how and when to take each.     Victoza 3-Nitin 0.6 mg/0.1 mL (18 mg/3 mL) Pnij  Generic drug:  liraglutide 0.6 mg/0.1 mL (18 mg/3 mL) subq PNIJ  Use 1.8mg under the skin daily  What changed:    · how much to take  · how to take this  · when to take this         * This list has 11 medication(s) that are the same as other medications prescribed for you. Read the directions carefully, and ask your doctor or other care provider to review them with you.            CONTINUE taking these medications    ALPRAZolam 2 MG Tab  Commonly known as:  XANAX  Take one tablet by mouth twice daily     amLODIPine 10 MG tablet  Commonly known as:  NORVASC  Take 10 mg by mouth once daily.     aspirin 81 MG EC tablet  Commonly known as:  ECOTRIN  Take 81 mg by mouth once daily.     atorvastatin 20 MG tablet  Commonly known as:  LIPITOR  Take 1 tablet (20 mg total) by mouth every evening.     BD Insulin Syringe Ultra-Fine 0.5 mL 30 gauge x 1/2" Syrg  Generic drug:  insulin syringe-needle U-100     BD Ultra-Fine Mini Pen Needle 31 gauge x 3/16" Ndle  Generic drug:  pen needle, diabetic  Use 5 a day     cyclobenzaprine 10 MG tablet  Commonly known as:  FLEXERIL  Take 1 tablet (10 mg total) by mouth 3 (three) times daily as needed for Muscle spasms.     DAILY MULTIPLE FOR WOMEN ORAL  Take 1 tablet by mouth once daily.     ergocalciferol 50,000 unit Cap  Commonly known as:  ERGOCALCIFEROL  Take 1 capsule (50,000 Units total) by mouth every 14 (fourteen) days.     fish oil-omega-3 fatty acids 300-1,000 mg capsule  Take 1 capsule by mouth once daily.     frovatriptan 2.5 MG tablet  Commonly known as:  FROVA  Take one tablet orally. If recurs, may repeat after 2 hours. Max of 3 tabs in 24 hours.     furosemide 20 MG tablet  Commonly " known as:  LASIX  Take 1 tablet (20 mg total) by mouth once daily.     gabapentin 300 MG capsule  Commonly known as:  NEURONTIN  Take 2 capsules (600 mg total) by mouth 3 (three) times daily.     hydrALAZINE 10 MG tablet  Commonly known as:  APRESOLINE  Take 1 tablet (10 mg total) by mouth every 12 (twelve) hours.     Linzess 145 mcg Cap capsule  Generic drug:  linaCLOtide  Take 145 mcg by mouth as needed.     magnesium oxide 400 mg (241.3 mg magnesium) tablet  Commonly known as:  MAG-OX  Take 1 tablet (400 mg total) by mouth once daily.     meclizine 25 mg tablet  Commonly known as:  ANTIVERT  Take 1 tablet (25 mg total) by mouth 3 (three) times daily as needed.     metFORMIN 1000 MG tablet  Commonly known as:  GLUCOPHAGE  TAKE 1 TABLET (1,000 MG TOTAL) BY MOUTH 2 (TWO) TIMES DAILY.     metoprolol succinate 100 MG 24 hr tablet  Commonly known as:  TOPROL-XL  Take 2 tablets (200 mg total) by mouth every evening.     mometasone 220 mcg/ actuation (120) Aepb  Commonly known as:  Asmanex Twisthaler  Inhale 2 puffs into the lungs 2 (two) times daily. Controller     Restasis 0.05 % ophthalmic emulsion  Generic drug:  cycloSPORINE  Place 0.4 mLs (1 drop total) into both eyes 2 (two) times daily.     spironolactone 25 MG tablet  Commonly known as:  ALDACTONE  Take 1 tablet (25 mg total) by mouth once daily.     topiramate 50 MG tablet  Commonly known as:  TOPAMAX  Take 1 tablet (50 mg total) by mouth 2 (two) times daily.     True Metrix Glucose Meter Misc  Generic drug:  blood-glucose meter  Use to check blood sugars, give meter based on insurance specification     True Metrix Glucose Test Strip Strp  Generic drug:  blood sugar diagnostic  Check blood glucose 4 times daily as directed and as needed (dispense insurance preferred brand or patient choice)     TRUEplus Lancets 28 gauge Misc  Generic drug:  lancets  use as directed 3 times daily     valACYclovir 1000 MG tablet  Commonly known as:  VALTREX  Take 1 tablet (1,000  mg total) by mouth once daily.     valsartan 320 MG tablet  Commonly known as:  DIOVAN  Take 1 tablet (320 mg total) by mouth once daily.          Time spent on the discharge of patient: 12 minutes    Damir Driscoll MD  General Surgery  [facility] -

## 2020-01-04 NOTE — PROGRESS NOTES
[facility] -   General Surgery  Progress Note    Subjective:     History of Present Illness:  No notes on file    Post-Op Info:  Procedure(s) (LRB):  XI ROBOTIC CHOLECYSTECTOMY (N/A)   1 Day Post-Op     Interval History:  Post robotic cholecystectomy tolerated her diet.  Will advance to a diabetic diet as tolerated discharge home    Medications:  Continuous Infusions:   lactated ringers 100 mL/hr at 01/03/20 1125     Scheduled Meds:   amLODIPine  10 mg Oral Daily    ARIPiprazole  10 mg Oral QHS    atorvastatin  20 mg Oral QHS    benztropine  1 mg Oral Nightly    budesonide  0.5 mg Nebulization Q12H    chlorhexidine  10 mL Mouth/Throat BID    DULoxetine  60 mg Oral BID    furosemide  20 mg Oral Daily    gabapentin  600 mg Oral TID    hydrALAZINE  10 mg Oral Q12H    losartan  50 mg Oral Daily    metoprolol succinate  200 mg Oral QHS    nozaseptin   Each Nostril BID    spironolactone  25 mg Oral Daily    topiramate  50 mg Oral BID     PRN Meds:ALPRAZolam, dextrose 10 % in water (D10W), dextrose 10 % in water (D10W), diphenhydrAMINE, glucagon (human recombinant), glucose, glucose, HYDROmorphone, insulin aspart U-100, melatonin, ondansetron, oxyCODONE-acetaminophen, oxyCODONE-acetaminophen, simethicone     Review of patient's allergies indicates:   Allergen Reactions    Codeine Itching    Neuromuscular blockers, steroidal Hives     some    Latex, natural rubber Rash    Morphine Rash     itching    Norco [hydrocodone-acetaminophen] Itching, Rash and Hallucinations    Seconal [secobarbital sodium] Rash     itching    Tylox [oxycodone-acetaminophen] Rash     Objective:     Vital Signs (Most Recent):  Temp: 98.4 °F (36.9 °C) (01/04/20 0725)  Pulse: 65 (01/04/20 0725)  Resp: 20 (01/04/20 0725)  BP: 107/70 (01/04/20 0725)  SpO2: 95 % (01/04/20 0725) Vital Signs (24h Range):  Temp:  [97.1 °F (36.2 °C)-98.4 °F (36.9 °C)] 98.4 °F (36.9 °C)  Pulse:  [65-81] 65  Resp:  [10-25] 20  SpO2:  [93  %-100 %] 95 %  BP: ()/() 107/70     Weight: 91.2 kg (201 lb 1 oz)  Body mass index is 45.08 kg/m².    Intake/Output - Last 3 Shifts       01/02 0700 - 01/03 0659 01/03 0700 - 01/04 0659 01/04 0700 - 01/05 0659    I.V. (mL/kg)  2758.3 (30.2)     Total Intake(mL/kg)  2758.3 (30.2)     Blood  10     Total Output  10     Net  +2748.3            Urine Occurrence  1 x           Physical Exam   Constitutional: She is oriented to person, place, and time. No distress.   Obese   HENT:   Head: Normocephalic and atraumatic.   Eyes: No scleral icterus.   Neck: Normal range of motion. Neck supple.   Cardiovascular: Normal rate, regular rhythm, normal heart sounds and intact distal pulses.   Pulmonary/Chest: Effort normal and breath sounds normal.   Abdominal: Bowel sounds are normal. She exhibits no distension. There is tenderness (Incision).   Obese  Incisions are   Musculoskeletal: She exhibits no edema.   Neurological: She is alert and oriented to person, place, and time.   Skin: Skin is warm and dry. Capillary refill takes less than 2 seconds.   Psychiatric: She has a normal mood and affect. Her behavior is normal. Judgment and thought content normal.   Vitals reviewed.      Significant Labs:  none    Significant Diagnostics:  none    Assessment/Plan:     * Calculus of gallbladder without cholecystitis without obstruction  Postop day 1 from a robotic cholecystectomy, doing well.  Diabetic diet as tolerated discharge home        Damir Driscoll MD  General Surgery  [facility] -

## 2020-01-04 NOTE — HOSPITAL COURSE
Patient reports a history of abdominal pain. She describes the abdominal pain as being in the middle and right and left lower quadrants.  She describes this pain is spasm like.  It can occur after eating and it can be sharp at times.     The patient also describes a right upper quadrant pain that she says is sharp.  She also says the pain can be intermittent are constant.  The pain may occur after eating food.  It may also occur intermittently.  She does not describe any nausea vomiting constipation or diarrhea        The patient states that she gets chest pain and shortness of breath with any activity or with an anxiety attack.     The chest pain was evaluated by a stress test in January, Care everywhere section, which was negative.     Pulmonary function test show mild diffusion capacity decreases.     The patient is also concerned that her abdomen is protuberant.  She states this may be related to distension.  She also reports a bulge of the abdominal wall on the left side.  She is concerned that she has a hernia.    She presents for cholecystectomy.  She has been seen by Cardiology and there is low cardiac risk

## 2020-01-04 NOTE — DISCHARGE INSTRUCTIONS
PLEASE RESUME ALL YOUR HOME MEDICATIONS.  THE ONLY NEW 1 IS PERCOCET FOR PAIN    Please call for any fever, increase in pain, nausea or vomiting or redness or drainage from incision(s).    No lifting more than 30 pounds for 2 weeks    May shower       If you become constipated from the pain medication you can use over the counter laxatives,  Miralax or Glycolax, or Magnesium Citrate for severe constipation.

## 2020-01-04 NOTE — SUBJECTIVE & OBJECTIVE
Interval History:  Post robotic cholecystectomy tolerated her diet.  Will advance to a diabetic diet as tolerated discharge home    Medications:  Continuous Infusions:   lactated ringers 100 mL/hr at 01/03/20 1125     Scheduled Meds:   amLODIPine  10 mg Oral Daily    ARIPiprazole  10 mg Oral QHS    atorvastatin  20 mg Oral QHS    benztropine  1 mg Oral Nightly    budesonide  0.5 mg Nebulization Q12H    chlorhexidine  10 mL Mouth/Throat BID    DULoxetine  60 mg Oral BID    furosemide  20 mg Oral Daily    gabapentin  600 mg Oral TID    hydrALAZINE  10 mg Oral Q12H    losartan  50 mg Oral Daily    metoprolol succinate  200 mg Oral QHS    nozaseptin   Each Nostril BID    spironolactone  25 mg Oral Daily    topiramate  50 mg Oral BID     PRN Meds:ALPRAZolam, dextrose 10 % in water (D10W), dextrose 10 % in water (D10W), diphenhydrAMINE, glucagon (human recombinant), glucose, glucose, HYDROmorphone, insulin aspart U-100, melatonin, ondansetron, oxyCODONE-acetaminophen, oxyCODONE-acetaminophen, simethicone     Review of patient's allergies indicates:   Allergen Reactions    Codeine Itching    Neuromuscular blockers, steroidal Hives     some    Latex, natural rubber Rash    Morphine Rash     itching    Norco [hydrocodone-acetaminophen] Itching, Rash and Hallucinations    Seconal [secobarbital sodium] Rash     itching    Tylox [oxycodone-acetaminophen] Rash     Objective:     Vital Signs (Most Recent):  Temp: 98.4 °F (36.9 °C) (01/04/20 0725)  Pulse: 65 (01/04/20 0725)  Resp: 20 (01/04/20 0725)  BP: 107/70 (01/04/20 0725)  SpO2: 95 % (01/04/20 0725) Vital Signs (24h Range):  Temp:  [97.1 °F (36.2 °C)-98.4 °F (36.9 °C)] 98.4 °F (36.9 °C)  Pulse:  [65-81] 65  Resp:  [10-25] 20  SpO2:  [93 %-100 %] 95 %  BP: ()/() 107/70     Weight: 91.2 kg (201 lb 1 oz)  Body mass index is 45.08 kg/m².    Intake/Output - Last 3 Shifts       01/02 0700 - 01/03 0659 01/03 0700 - 01/04 0659 01/04 0700 - 01/05  0659    I.V. (mL/kg)  2758.3 (30.2)     Total Intake(mL/kg)  2758.3 (30.2)     Blood  10     Total Output  10     Net  +2748.3            Urine Occurrence  1 x           Physical Exam   Constitutional: She is oriented to person, place, and time. No distress.   Obese   HENT:   Head: Normocephalic and atraumatic.   Eyes: No scleral icterus.   Neck: Normal range of motion. Neck supple.   Cardiovascular: Normal rate, regular rhythm, normal heart sounds and intact distal pulses.   Pulmonary/Chest: Effort normal and breath sounds normal.   Abdominal: Bowel sounds are normal. She exhibits no distension. There is tenderness (Incision).   Obese  Incisions are   Musculoskeletal: She exhibits no edema.   Neurological: She is alert and oriented to person, place, and time.   Skin: Skin is warm and dry. Capillary refill takes less than 2 seconds.   Psychiatric: She has a normal mood and affect. Her behavior is normal. Judgment and thought content normal.   Vitals reviewed.      Significant Labs:  none    Significant Diagnostics:  none

## 2020-01-05 ENCOUNTER — HOSPITAL ENCOUNTER (EMERGENCY)
Facility: HOSPITAL | Age: 50
Discharge: HOME | End: 2020-01-06
Attending: FAMILY MEDICINE
Payer: MEDICAID

## 2020-01-05 DIAGNOSIS — R50.9 FEVER, UNSPECIFIED FEVER CAUSE: Primary | ICD-10-CM

## 2020-01-05 DIAGNOSIS — J06.9 VIRAL URI: ICD-10-CM

## 2020-01-05 LAB
BASOPHILS # BLD AUTO: 0.02 K/UL (ref 0–0.2)
BASOPHILS NFR BLD: 0.2 % (ref 0–1.9)
DIFFERENTIAL METHOD: ABNORMAL
EOSINOPHIL # BLD AUTO: 0.3 K/UL (ref 0–0.5)
EOSINOPHIL NFR BLD: 3.6 % (ref 0–8)
ERYTHROCYTE [DISTWIDTH] IN BLOOD BY AUTOMATED COUNT: 14.6 % (ref 11.5–14.5)
HCT VFR BLD AUTO: 33.1 % (ref 37–48.5)
HGB BLD-MCNC: 10.1 G/DL (ref 12–16)
IMM GRANULOCYTES # BLD AUTO: 0.08 K/UL (ref 0–0.04)
IMM GRANULOCYTES NFR BLD AUTO: 0.9 % (ref 0–0.5)
INFLUENZA A, MOLECULAR: NEGATIVE
INFLUENZA B, MOLECULAR: NEGATIVE
LYMPHOCYTES # BLD AUTO: 2.7 K/UL (ref 1–4.8)
LYMPHOCYTES NFR BLD: 30.1 % (ref 18–48)
MCH RBC QN AUTO: 27.5 PG (ref 27–31)
MCHC RBC AUTO-ENTMCNC: 30.5 G/DL (ref 32–36)
MCV RBC AUTO: 90 FL (ref 82–98)
MONOCYTES # BLD AUTO: 0.5 K/UL (ref 0.3–1)
MONOCYTES NFR BLD: 5.7 % (ref 4–15)
NEUTROPHILS # BLD AUTO: 5.4 K/UL (ref 1.8–7.7)
NEUTROPHILS NFR BLD: 59.5 % (ref 38–73)
NRBC BLD-RTO: 0 /100 WBC
PLATELET # BLD AUTO: 294 K/UL (ref 150–350)
PMV BLD AUTO: 9.9 FL (ref 9.2–12.9)
RBC # BLD AUTO: 3.67 M/UL (ref 4–5.4)
SPECIMEN SOURCE: NORMAL
WBC # BLD AUTO: 9.06 K/UL (ref 3.9–12.7)

## 2020-01-05 PROCEDURE — 83690 ASSAY OF LIPASE: CPT

## 2020-01-05 PROCEDURE — 99285 EMERGENCY DEPT VISIT HI MDM: CPT | Mod: 25

## 2020-01-05 PROCEDURE — 85025 COMPLETE CBC W/AUTO DIFF WBC: CPT

## 2020-01-05 PROCEDURE — 81003 URINALYSIS AUTO W/O SCOPE: CPT

## 2020-01-05 PROCEDURE — 36415 COLL VENOUS BLD VENIPUNCTURE: CPT

## 2020-01-05 PROCEDURE — 87502 INFLUENZA DNA AMP PROBE: CPT

## 2020-01-05 PROCEDURE — 80053 COMPREHEN METABOLIC PANEL: CPT

## 2020-01-06 VITALS
WEIGHT: 207 LBS | HEART RATE: 88 BPM | DIASTOLIC BLOOD PRESSURE: 56 MMHG | OXYGEN SATURATION: 99 % | HEIGHT: 56 IN | RESPIRATION RATE: 16 BRPM | BODY MASS INDEX: 46.56 KG/M2 | SYSTOLIC BLOOD PRESSURE: 121 MMHG | TEMPERATURE: 98 F

## 2020-01-06 LAB
ALBUMIN SERPL BCP-MCNC: 3.4 G/DL (ref 3.5–5.2)
ALP SERPL-CCNC: 95 U/L (ref 55–135)
ALT SERPL W/O P-5'-P-CCNC: 23 U/L (ref 10–44)
ANION GAP SERPL CALC-SCNC: 12 MMOL/L (ref 8–16)
AST SERPL-CCNC: 15 U/L (ref 10–40)
BILIRUB SERPL-MCNC: 0.2 MG/DL (ref 0.1–1)
BILIRUB UR QL STRIP: NEGATIVE
BUN SERPL-MCNC: 10 MG/DL (ref 6–20)
CALCIUM SERPL-MCNC: 9.3 MG/DL (ref 8.7–10.5)
CHLORIDE SERPL-SCNC: 104 MMOL/L (ref 95–110)
CLARITY UR: CLEAR
CO2 SERPL-SCNC: 24 MMOL/L (ref 23–29)
COLOR UR: YELLOW
CREAT SERPL-MCNC: 0.7 MG/DL (ref 0.5–1.4)
EST. GFR  (AFRICAN AMERICAN): >60 ML/MIN/1.73 M^2
EST. GFR  (NON AFRICAN AMERICAN): >60 ML/MIN/1.73 M^2
GLUCOSE SERPL-MCNC: 187 MG/DL (ref 70–110)
GLUCOSE UR QL STRIP: NEGATIVE
HGB UR QL STRIP: NEGATIVE
KETONES UR QL STRIP: NEGATIVE
LEUKOCYTE ESTERASE UR QL STRIP: NEGATIVE
LIPASE SERPL-CCNC: 12 U/L (ref 4–60)
NITRITE UR QL STRIP: NEGATIVE
PH UR STRIP: 7 [PH] (ref 5–8)
POTASSIUM SERPL-SCNC: 4.3 MMOL/L (ref 3.5–5.1)
PROT SERPL-MCNC: 6.8 G/DL (ref 6–8.4)
PROT UR QL STRIP: NEGATIVE
SODIUM SERPL-SCNC: 140 MMOL/L (ref 136–145)
SP GR UR STRIP: <=1.005 (ref 1–1.03)
URN SPEC COLLECT METH UR: ABNORMAL
UROBILINOGEN UR STRIP-ACNC: NEGATIVE EU/DL

## 2020-01-06 PROCEDURE — 25500020 PHARM REV CODE 255: Performed by: FAMILY MEDICINE

## 2020-01-06 RX ADMIN — IOHEXOL 75 ML: 350 INJECTION, SOLUTION INTRAVENOUS at 01:01

## 2020-01-06 NOTE — DISCHARGE INSTRUCTIONS
Please follow-up withSurgery as scheduled.  At this time there is nothing acute as per chest x-ray and blood work.    Regarding UPPER RESPIRATORY ILLNESS, for treatment, I encouraged patient to: drink plenty of fluids; get lots of rest; take medications as prescribed; use over-the-counter medications for symptomatic treatment;  and use a humidifier or steam in the bathroom to improve patency of upper airway.  Patient instructed to notify primary care provider, or return to the emergency department, if the they: have a cough most days or have a cough that returns frequently; begin coughing up blood; develop a high fever or shaking chills; have a low-grade fever for three or more days; develop thick, greenish mucus, especially if it has a bad smell; and experience shortness of breath or chest pain. For prevention, discussed with patient the importance of refraining from smoking (if applicable), getting annual influenza vaccines, reducing exposure to air pollution, and the need to frequently wash hands to avoid spread of infection.

## 2020-01-06 NOTE — ED PROVIDER NOTES
SCRIBE #1 NOTE: I, Binh Muro, am scribing for, and in the presence of, Stacie Mann MD. I have scribed the entire note.       History     Chief Complaint   Patient presents with    Fever     pt had a her gallbladder removed on the 1/3. today the pt reports fever, chills and dizzines      Review of patient's allergies indicates:   Allergen Reactions    Codeine Itching    Neuromuscular blockers, steroidal Hives     some    Latex, natural rubber Rash    Morphine Rash     itching    Norco [hydrocodone-acetaminophen] Itching, Rash and Hallucinations    Seconal [secobarbital sodium] Rash     itching    Tylox [oxycodone-acetaminophen] Rash         History of Present Illness     HPI    1/5/2020, 11:06 PM  History obtained from the patient      History of Present Illness: Yolanda Betts is a 49 y.o. female patient with a PMHx of DM2, schizoaffective disorder (bipolar type), asthma, anemia, anxiety, bipolar 1 disorder, and HTN who is s/p DAGO (1/3) who presents to the Emergency Department for evaluation of fever (Tmax 100) which onset gradually today. Pt had a cholecystectomy on 1/3, and today reports fever, chills, and dizziness.Prior Tx includes Tylenol (last dose 9 hours PTA) with minimal sx improvement.  Patient states that she has been coughing a lot more with clear to white mucus.  Denies any chest pain.  Reports mild generalized abdominal pain however denies any worsening abdominal pain from postop and is well controlled with pain medication..  Reports last bowel movement was yesterday.  Denies any nausea vomiting or diarrhea.    Arrival mode: Personal vehicle    PCP: CHARLA Beach MD        Past Medical History:  Past Medical History:   Diagnosis Date    Abnormal Pap smear of cervix     HPV genital warts    Anemia     Anxiety     Arthritis     Asthma 10/11/2016    Bipolar 1 disorder     Diabetes mellitus, type 2     Dyslipidemia associated with type 2 diabetes mellitus 5/13/2019     Genital warts     GERD (gastroesophageal reflux disease)     Herpes simplex virus (HSV) infection     Hypertension     Hypertension complicating diabetes 2019    Migraine with aura and without status migrainosus, not intractable 3/21/2016    Mild persistent asthma without complication 10/11/2016    Morbid obesity with body mass index (BMI) of 45.0 to 49.9 in adult 8/3/2017    Obstructive sleep apnea     ALEN (obstructive sleep apnea)     2L per N/C q HS    Schizoaffective disorder, bipolar type 2019    Seasonal allergic rhinitis due to pollen 2019    Type 2 diabetes mellitus with hyperglycemia, with long-term current use of insulin 2017    Type 2 diabetes mellitus with hyperglycemia, without long-term current use of insulin 2017       Past Surgical History:  Past Surgical History:   Procedure Laterality Date    abcess removed right back      BELT ABDOMINOPLASTY      BREAST SURGERY Bilateral 1996    Reduction     SECTION      X 2    COLONOSCOPY      COLONOSCOPY N/A 2018    Procedure: colonoscopy for iron deficiency anemia;  Surgeon: Frankie Sanchez MD;  Location: [facility] ENDO;  Service: Endoscopy;  Laterality: N/A;    COLONOSCOPY N/A 2018    Procedure: COLONOSCOPY;  Surgeon: Frankie Sanchez MD;  Location: [facility] ENDO;  Service: Endoscopy;  Laterality: N/A;    HYSTERECTOMY      w/ BSO; hypermenorrhea    MOUTH SURGERY      OOPHORECTOMY      hyst/bso, hypermenorrhea    ROBOT-ASSISTED CHOLECYSTECTOMY USING DA DARI XI N/A 1/3/2020    Procedure: XI ROBOTIC CHOLECYSTECTOMY;  Surgeon: Damir Driscoll MD;  Location: [facility] OR;  Service: General;  Laterality: N/A;    TUBAL LIGATION           Family History:  Family History   Problem Relation Age of Onset    Diabetes Mother     Hyperlipidemia Mother     Hypertension Mother     Asthma Mother     COPD Mother     Glaucoma Mother     Thyroid disease Mother     Anesthesia problems Mother         " "almost had a cardiac arrest" , blood clots    Hypertension Father     Hyperlipidemia Father     Cancer Father         Brain, lung, liver, kidney    Heart disease Maternal Grandmother     Hyperlipidemia Maternal Grandmother     Hypertension Maternal Grandmother     Cataracts Maternal Grandmother     Diabetes Maternal Grandmother     Heart disease Maternal Grandfather     Hyperlipidemia Maternal Grandfather     Hypertension Maternal Grandfather     Glaucoma Maternal Grandfather     Cancer Maternal Grandfather     Cataracts Maternal Grandfather     Macular degeneration Maternal Grandfather     Diabetes Maternal Grandfather     Heart disease Paternal Grandmother     Hyperlipidemia Paternal Grandmother     Hypertension Paternal Grandmother     Cataracts Paternal Grandmother     Heart disease Paternal Grandfather     Hyperlipidemia Paternal Grandfather     Hypertension Paternal Grandfather     Cataracts Paternal Grandfather     Breast cancer Maternal Cousin     Breast cancer Maternal Cousin     Breast cancer Maternal Cousin     Breast cancer Maternal Cousin        Social History:  Social History     Tobacco Use    Smoking status: Never Smoker    Smokeless tobacco: Never Used   Substance and Sexual Activity    Alcohol use: Yes     Alcohol/week: 0.0 standard drinks     Comment: socially  No alcohol 72h prior to sx    Drug use: No    Sexual activity: Not Currently     Partners: Male        Review of Systems     Review of Systems   Constitutional: Positive for chills and fever (Tmax 103).   HENT: Negative for sore throat.    Respiratory: Negative for cough and shortness of breath.    Cardiovascular: Negative for chest pain and leg swelling.   Gastrointestinal: Negative for abdominal pain (Baseline post-op pain), constipation, diarrhea, nausea and vomiting.   Genitourinary: Negative for dysuria.   Musculoskeletal: Negative for back pain, neck pain and neck stiffness.   Skin: Negative for rash " "and wound.   Neurological: Positive for dizziness. Negative for weakness, light-headedness, numbness and headaches.   Hematological: Does not bruise/bleed easily.   All other systems reviewed and are negative.     Physical Exam     Initial Vitals   BP Pulse Resp Temp SpO2   01/05/20 2258 01/05/20 2258 01/05/20 2258 01/05/20 2258 01/05/20 2257   (!) 141/72 92 15 99.1 °F (37.3 °C) 100 %      MAP       --                 Physical Exam  Nursing Notes and Vital Signs Reviewed.  Constitutional: Patient is in no acute distress. Well-developed and well-nourished.  Head: Atraumatic. Normocephalic.  Eyes: PERRL. EOM intact. Conjunctivae are not pale. No scleral icterus.  ENT: Mucous membranes are moist. Oropharynx is clear and symmetric.    Neck: Supple. Full ROM. No lymphadenopathy.  Cardiovascular: Regular rate. Regular rhythm. No murmurs, rubs, or gallops. Distal pulses are 2+ and symmetric.  Pulmonary/Chest: No respiratory distress. Clear to auscultation bilaterally. No wheezing or rales.  Abdominal: Clean, dry, intact incision sites on abdomen; slightly distended abdomen with mild tenderness to palpation throughout. No rebound, guarding, or rigidity. Good bowel sounds.  Genitourinary: No CVA tenderness  Musculoskeletal: Moves all extremities. No obvious deformities. No edema. No calf tenderness.  Skin: Warm and dry.  Neurological:  Alert, awake, and appropriate.  Normal speech.  No acute focal neurological deficits are appreciated.  Psychiatric: Normal affect. Good eye contact. Appropriate in content.     ED Course   Procedures  ED Vital Signs:  Vitals:    01/05/20 2257 01/05/20 2258 01/06/20 0215   BP:  (!) 141/72 (!) 121/56   Pulse:  92 88   Resp:  15 16   Temp:  99.1 °F (37.3 °C) 98.4 °F (36.9 °C)   TempSrc: Oral Oral    SpO2: 100% 95% 99%   Weight: 93.9 kg (207 lb 0.2 oz)     Height: 4' 8" (1.422 m)         Abnormal Lab Results:  Labs Reviewed   CBC W/ AUTO DIFFERENTIAL - Abnormal; Notable for the following " components:       Result Value    RBC 3.67 (*)     Hemoglobin 10.1 (*)     Hematocrit 33.1 (*)     Mean Corpuscular Hemoglobin Conc 30.5 (*)     RDW 14.6 (*)     Immature Granulocytes 0.9 (*)     Immature Grans (Abs) 0.08 (*)     All other components within normal limits   COMPREHENSIVE METABOLIC PANEL - Abnormal; Notable for the following components:    Glucose 187 (*)     Albumin 3.4 (*)     All other components within normal limits   URINALYSIS, REFLEX TO URINE CULTURE - Abnormal; Notable for the following components:    Specific Gravity, UA <=1.005 (*)     All other components within normal limits    Narrative:     Preferred Collection Type->Urine, Clean Catch   INFLUENZA A & B BY MOLECULAR   LIPASE        All Lab Results:  Results for orders placed or performed during the hospital encounter of 01/05/20   Influenza A & B by Molecular   Result Value Ref Range    Influenza A, Molecular Negative Negative    Influenza B, Molecular Negative Negative    Flu A & B Source Nasal swab    CBC auto differential   Result Value Ref Range    WBC 9.06 3.90 - 12.70 K/uL    RBC 3.67 (L) 4.00 - 5.40 M/uL    Hemoglobin 10.1 (L) 12.0 - 16.0 g/dL    Hematocrit 33.1 (L) 37.0 - 48.5 %    Mean Corpuscular Volume 90 82 - 98 fL    Mean Corpuscular Hemoglobin 27.5 27.0 - 31.0 pg    Mean Corpuscular Hemoglobin Conc 30.5 (L) 32.0 - 36.0 g/dL    RDW 14.6 (H) 11.5 - 14.5 %    Platelets 294 150 - 350 K/uL    MPV 9.9 9.2 - 12.9 fL    Immature Granulocytes 0.9 (H) 0.0 - 0.5 %    Gran # (ANC) 5.4 1.8 - 7.7 K/uL    Immature Grans (Abs) 0.08 (H) 0.00 - 0.04 K/uL    Lymph # 2.7 1.0 - 4.8 K/uL    Mono # 0.5 0.3 - 1.0 K/uL    Eos # 0.3 0.0 - 0.5 K/uL    Baso # 0.02 0.00 - 0.20 K/uL    nRBC 0 0 /100 WBC    Gran% 59.5 38.0 - 73.0 %    Lymph% 30.1 18.0 - 48.0 %    Mono% 5.7 4.0 - 15.0 %    Eosinophil% 3.6 0.0 - 8.0 %    Basophil% 0.2 0.0 - 1.9 %    Differential Method Automated    Comprehensive metabolic panel   Result Value Ref Range    Sodium 140 136 -  145 mmol/L    Potassium 4.3 3.5 - 5.1 mmol/L    Chloride 104 95 - 110 mmol/L    CO2 24 23 - 29 mmol/L    Glucose 187 (H) 70 - 110 mg/dL    BUN, Bld 10 6 - 20 mg/dL    Creatinine 0.7 0.5 - 1.4 mg/dL    Calcium 9.3 8.7 - 10.5 mg/dL    Total Protein 6.8 6.0 - 8.4 g/dL    Albumin 3.4 (L) 3.5 - 5.2 g/dL    Total Bilirubin 0.2 0.1 - 1.0 mg/dL    Alkaline Phosphatase 95 55 - 135 U/L    AST 15 10 - 40 U/L    ALT 23 10 - 44 U/L    Anion Gap 12 8 - 16 mmol/L    eGFR if African American >60 >60 mL/min/1.73 m^2    eGFR if non African American >60 >60 mL/min/1.73 m^2   Lipase   Result Value Ref Range    Lipase 12 4 - 60 U/L   Urinalysis, Reflex to Urine Culture Urine, Clean Catch   Result Value Ref Range    Specimen UA Urine, Clean Catch     Color, UA Yellow Yellow, Straw, Milagros    Appearance, UA Clear Clear    pH, UA 7.0 5.0 - 8.0    Specific Gravity, UA <=1.005 (A) 1.005 - 1.030    Protein, UA Negative Negative    Glucose, UA Negative Negative    Ketones, UA Negative Negative    Bilirubin (UA) Negative Negative    Occult Blood UA Negative Negative    Nitrite, UA Negative Negative    Urobilinogen, UA Negative <2.0 EU/dL    Leukocytes, UA Negative Negative        Imaging Results:  Imaging Results          CT Abdomen Pelvis With Contrast (Final result)  Result time 01/06/20 08:11:36    Final result by Delon Mitchell MD (01/06/20 08:11:36)                 Impression:      Gallbladder surgically absent. Mild-to-moderate postoperative inflammation at the gallbladder fossa. No fluid collection, biloma, or abscess.    Fat stranding anterior abdominal wall, nonspecific. Please correlate for cellulitis.      Electronically signed by: Delon Mitchell  Date:    01/06/2020  Time:    08:11             Narrative:    EXAMINATION:  CT ABDOMEN PELVIS WITH CONTRAST    CLINICAL HISTORY:  post op fever;    TECHNIQUE:  Low dose axial images, sagittal and coronal reformations were obtained from the lung bases to the pubic symphysis after IV 75 mL  Omnipaque 350.    All CT scans at this location are performed using dose modulation techniques as appropriate to a performed exam including the following: Automated exposure control; adjustment of the mA and/or kV according to patient size.    COMPARISON:  None    FINDINGS:  Heart: Normal in size. No pericardial effusion.    Lung Bases: Well aerated, without consolidation or pleural fluid.    Liver: Hepatomegaly with fatty infiltration.    Gallbladder: Surgically absent.  Mild-to-moderate postoperative inflammation at the gallbladder fossa.  No fluid collection, biloma, or abscess.    Bile Ducts: No evidence of dilated ducts.    Pancreas: No mass or peripancreatic fat stranding.    Spleen: Unremarkable.    Adrenals: Unremarkable.    Kidneys/ Ureters: Unremarkable.    Bladder: No evidence of wall thickening.    Reproductive organs: Uterus and ovaries surgically absent.    GI Tract/Mesentery: Moderate large volume stool throughout the colon.    Appendix: No evidence of appendictis.    Peritoneal Space: No ascites. No free air.    Retroperitoneum: No significant adenopathy.    Abdominal wall: Fat stranding anterior abdominal wall, nonspecific.  Please correlate for cellulitis.    Vasculature: No significant atherosclerosis or aneurysm.    Bones: No acute fracture.                               X-Ray Chest AP Portable (Final result)  Result time 01/05/20 23:58:11    Final result by Lm Mejia MD (01/05/20 23:58:11)                 Impression:      No acute cardiopulmonary disease.      Electronically signed by: Lm Mejia MD  Date:    01/05/2020  Time:    23:58             Narrative:    EXAMINATION:  XR CHEST AP PORTABLE    CLINICAL HISTORY:  fever;    COMPARISON:  Chest x-ray, 10/04/2019    FINDINGS:  The lungs are clear. The cardiac silhouette is within normal limits. The bones are intact.                            1:19 AM: Per STAT radiology, pt's CT Abdomen and Pelvis with IV Contrast results:   Radiologist:  Raz Bass MD  IMPRESSION:   1. Cholecystectomy. Gallbladder fossa small fluid which may be postop, bile leakage, versus infection/inflammation. No discrete abscess.  2. Ventral subcutaneous edema. No discrete abscess.  3. Moderate colonic stool which may be ileus/constipation. No mechanical bowel obstruction. No diverticulitis.           The Emergency Provider reviewed the vital signs and test results, which are outlined above.     ED Discussion     2:32 AM: Reassessed pt at this time. Pt states her condition has improved at this time. Discussed with pt all pertinent ED information and results. Discussed pt dx and plan of tx. Gave pt all f/u and return to the ED instructions. All questions and concerns were addressed at this time. Pt expresses understanding of information and instructions, and is comfortable with plan to discharge. Pt is stable for discharge.    I discussed with patient and/or family/caretaker that evaluation in the ED does not suggest any emergent or life threatening medical conditions requiring immediate intervention beyond what was provided in the ED, and I believe patient is safe for discharge.  Regardless, an unremarkable evaluation in the ED does not preclude the development or presence of a serious of life threatening condition. As such, patient was instructed to return immediately for any worsening or change in current symptoms.    ED Course as of Jan 07 0531   Mon Jan 06, 2020   0232 At this time I believe patient's symptoms are most likely a URI.  Clinically patient does not look like she has any acute intra-abdominal infectious process and she has been afebrile in the emergency department.  Patient also noted that her pain in the abdomen has not worsened either reinforcing the aspect that patient's subjective fevers might be a the different etiology.  She is stable for discharge.    [SIENNA]      ED Course User Index  [SIENNA] Stacie Mann MD     Medical Decision Making:   Clinical Tests:  "  Lab Tests: Ordered and Reviewed  Radiological Study: Ordered and Reviewed           ED Medication(s):  Medications   iohexol (OMNIPAQUE 350) injection 100 mL (75 mLs Intravenous Given 1/6/20 0102)       Discharge Medication List as of 1/6/2020  2:32 AM          Follow-up Information     CHARLA Beach MD. Schedule an appointment as soon as possible for a visit in 3 days.    Specialty:  Family Medicine  Contact information:  [id number]  [place] [address]  [phone]                       Scribe Attestation:   Scribe #1: I performed the above scribed service and the documentation accurately describes the services I performed. I attest to the accuracy of the note.     Attending:   Physician Attestation Statement for Scribe #1: I, Stacie Mann MD, personally performed the services described in this documentation, as scribed by Binh Muro, in my presence, and it is both accurate and complete.           Clinical Impression       ICD-10-CM ICD-9-CM   1. Fever, unspecified fever cause R50.9 780.60   2. Viral URI J06.9 465.9       Disposition:   Disposition: Discharged  Condition: Stable  Portions of this note may have been created with voice recognition software. Occasional "wrong-word" or "sound-a-like" substitutions may have occurred due to the inherent limitations of voice recognition software. Please, read the note carefully and recognize, using context, where substitutions have occurred.         01/07/2020  Addendum:  New CT results show potential cellulitis of the anterior abdominal wall which was not present on original CT read.  Discussed with charge nurse-will call patient and see clinical status.  If symptoms are worsening, will advise her to come back to the emergency department.  However if she states that she is feeling fine, will:  Bactrim for the patient and have close follow-up with surgery.     Stacie Mann MD  01/07/20 0533    "

## 2020-01-06 NOTE — ED NOTES
Patient complains of fever sp gallbladder surgery. Symptoms have been present since yesterday and a*. Patient describes symptoms as Tmax 0f Tmax 104. Previous treatment includes tylenol  Associated symptoms include  n/vPatient denies diarrhea     Level of Consciousness: The patient is awake, alert, and oriented with appropriate affect and speech; oriented to person, place and time.  Appearance: Sitting up in bed with no acute distress noted. Clothing and hygiene are clean and worn appropriately.  Skin: Skin is warm and dry with good skin turgor; intact; color consistent with ethnicity.  Mucous membranes are moist.   Musculoskeletal: Moves all extremities well in full range of motion. No obvious deformities or swelling noted.  Respiratory: Airway open and patent, respirations spontaneous, even and unlabored. No accessory muscles in use. Breath sounds clear   Cardiac: Regular rate and rhythm, no peripheral edema noted, good pulses palpated peripherally, capillary refill < 3 seconds.  Abdomen: Soft, non-tender to palpation. No distention noted.  Neurologic: PERRLA, face exhibits symmetrical expression, hand grasps equal and even bilaterally, reports normal sensation to all extremities and face.  Psychosocial: calm     Patient verbalized understanding of status and plan of care. Patient changed into hospital gown with assistance. Side rails up x 2, call light in reach, bed low and locked. Cardiac monitor applied to patient; alarms on, audible, and set.  at bedside. Will continue to monitor.

## 2020-01-07 ENCOUNTER — PATIENT OUTREACH (OUTPATIENT)
Dept: OTHER | Facility: OTHER | Age: 50
End: 2020-01-07

## 2020-01-07 ENCOUNTER — TELEPHONE (OUTPATIENT)
Dept: EMERGENCY MEDICINE | Facility: HOSPITAL | Age: 50
End: 2020-01-07

## 2020-01-07 NOTE — TELEPHONE ENCOUNTER
Spoke with patient on phone, she stated that she was no longer having fever, not feeling any worse but still having pain.  I instructed her to come back to the ED if she felt she was not improving.  She stated that she will see Dr Driscoll on 1/15/2020 and that I should call the A/B in to [facility].  I instructed her to f/u with closely with Dr Driscoll.  CT securely routed to Dr Driscoll with confirmation receipt.

## 2020-01-08 LAB
FINAL PATHOLOGIC DIAGNOSIS: NORMAL
GROSS: NORMAL
POCT GLUCOSE: 197 MG/DL (ref 70–110)

## 2020-01-09 NOTE — PROGRESS NOTES
Digital Medicine: Health  Follow-Up    Patient reports that she is feeling better although she is still recovering from surgery. Patient hasn't submitted a BP reading since 12/18/19. Encouraged patient to resume taking readings once she feels well enough to do so. Patient attributes elevated readings in December to rashid bladder pain. Will f/u in 4 weeks after patient has resumed taking readings.     The history is provided by the patient.     Follow Up  Follow-up reason(s): reading review and routine education          INTERVENTION(S)  reviewed monitoring technique and encouragement/support      There are no preventive care reminders to display for this patient.    Last 5 Patient Entered Readings                                      Current 30 Day Average: 155/97     Recent Readings 12/18/2019 12/10/2019 12/8/2019 12/6/2019 12/4/2019    SBP (mmHg) 155 155 151 164 159    DBP (mmHg) 97 97 100 106 100    Pulse 97 97 95 92 88                      Diet Screening       Deferred.    Physical Activity Screening       Deferred.    Medication Adherence Screening   She misses doses: never      Patient identified the following reasons for non-compliance: none    Patient reports no s/s or issues taking BP medication as prescribed.       SDOH

## 2020-01-10 ENCOUNTER — OFFICE VISIT (OUTPATIENT)
Dept: RHEUMATOLOGY | Facility: CLINIC | Age: 50
End: 2020-01-10
Payer: MEDICAID

## 2020-01-10 VITALS
SYSTOLIC BLOOD PRESSURE: 125 MMHG | HEIGHT: 56 IN | HEART RATE: 93 BPM | WEIGHT: 198.19 LBS | BODY MASS INDEX: 44.58 KG/M2 | DIASTOLIC BLOOD PRESSURE: 76 MMHG

## 2020-01-10 DIAGNOSIS — Z71.89 COUNSELING ON HEALTH PROMOTION AND DISEASE PREVENTION: ICD-10-CM

## 2020-01-10 DIAGNOSIS — M67.911 BILATERAL SHOULDER TENDINOPATHY: Primary | ICD-10-CM

## 2020-01-10 DIAGNOSIS — M79.7 FIBROMYALGIA: ICD-10-CM

## 2020-01-10 DIAGNOSIS — M67.912 BILATERAL SHOULDER TENDINOPATHY: Primary | ICD-10-CM

## 2020-01-10 PROCEDURE — 99999 PR PBB SHADOW E&M-EST. PATIENT-LVL III: ICD-10-PCS | Mod: PBBFAC,,, | Performed by: INTERNAL MEDICINE

## 2020-01-10 PROCEDURE — 99999 PR PBB SHADOW E&M-EST. PATIENT-LVL III: CPT | Mod: PBBFAC,,, | Performed by: INTERNAL MEDICINE

## 2020-01-10 PROCEDURE — 99213 OFFICE O/P EST LOW 20 MIN: CPT | Mod: PBBFAC | Performed by: INTERNAL MEDICINE

## 2020-01-10 PROCEDURE — 99214 OFFICE O/P EST MOD 30 MIN: CPT | Mod: S$PBB,25,, | Performed by: INTERNAL MEDICINE

## 2020-01-10 PROCEDURE — 99214 PR OFFICE/OUTPT VISIT, EST, LEVL IV, 30-39 MIN: ICD-10-PCS | Mod: S$PBB,25,, | Performed by: INTERNAL MEDICINE

## 2020-01-10 PROCEDURE — 20610 DRAIN/INJ JOINT/BURSA W/O US: CPT | Mod: PBBFAC | Performed by: INTERNAL MEDICINE

## 2020-01-10 PROCEDURE — 20610 LARGE JOINT ASPIRATION/INJECTION: L SUBACROMIAL BURSA: ICD-10-PCS | Mod: S$PBB,LT,, | Performed by: INTERNAL MEDICINE

## 2020-01-10 RX ORDER — TRIAMCINOLONE ACETONIDE 40 MG/ML
40 INJECTION, SUSPENSION INTRA-ARTICULAR; INTRAMUSCULAR
Status: DISCONTINUED | OUTPATIENT
Start: 2020-01-10 | End: 2020-01-10 | Stop reason: HOSPADM

## 2020-01-10 RX ADMIN — TRIAMCINOLONE ACETONIDE 40 MG: 40 INJECTION, SUSPENSION INTRA-ARTICULAR; INTRAMUSCULAR at 10:01

## 2020-01-10 NOTE — PROGRESS NOTES
RHEUMATOLOGY OUTPATIENT CLINIC NOTE    1/10/2020    Attending Rheumatologist: Abilio Gibbs  Primary Care Provider: CHARLA Beach MD   Physician Requesting Consultation: No referring provider defined for this encounter.  Chief Complaint/Reason For Consultation:  Chronic pain.    Subjective:       HPI  Yolanda Betts is a 49 y.o. Black or  female with chronic pain comes for follow-up.    Today  Last seen rheumatology clinic on December.  Recommendations provided for chronic pain.  Main complaint is generalized joint, worst in left shoulder.  Worst in the evening, aggravated by range of motion/lying on affected side, relieved somewhat by rest and OTC pain medication.  Denies association with prolonged morning stiffness, redness, or joint swelling.    Review of Systems   Constitutional: Negative for chills, fever and malaise/fatigue.   Eyes: Negative for pain and redness.   Respiratory: Negative for cough, hemoptysis and shortness of breath.    Cardiovascular: Negative for chest pain and leg swelling.   Gastrointestinal: Negative for abdominal pain, blood in stool and melena.   Genitourinary: Negative for dysuria and hematuria.   Musculoskeletal: Positive for joint pain (Generalized, worse shoulders.  Mechanical pattern.). Negative for falls.   Skin: Negative for rash.   Neurological: Negative for tingling and focal weakness.   Psychiatric/Behavioral: Negative for memory loss. The patient does not have insomnia.      Chronic comorbid conditions affecting medical decision making today:  Past Medical History:   Diagnosis Date    Abnormal Pap smear of cervix     HPV genital warts    Anemia     Anxiety     Arthritis     Asthma 10/11/2016    Bipolar 1 disorder     Diabetes mellitus, type 2     Dyslipidemia associated with type 2 diabetes mellitus 5/13/2019    Genital warts     GERD (gastroesophageal reflux disease)     Herpes simplex virus (HSV) infection     Hypertension      "Hypertension complicating diabetes 2019    Migraine with aura and without status migrainosus, not intractable 3/21/2016    Mild persistent asthma without complication 10/11/2016    Morbid obesity with body mass index (BMI) of 45.0 to 49.9 in adult 8/3/2017    Obstructive sleep apnea     ALEN (obstructive sleep apnea)     2L per N/C q HS    Schizoaffective disorder, bipolar type 2019    Seasonal allergic rhinitis due to pollen 2019    Type 2 diabetes mellitus with hyperglycemia, with long-term current use of insulin 2017    Type 2 diabetes mellitus with hyperglycemia, without long-term current use of insulin 2017     Past Surgical History:   Procedure Laterality Date    abcess removed right back      BELT ABDOMINOPLASTY      BREAST SURGERY Bilateral 1996    Reduction     SECTION      X 2    COLONOSCOPY      COLONOSCOPY N/A 2018    Procedure: colonoscopy for iron deficiency anemia;  Surgeon: Frankie Sanchez MD;  Location: [facility] ENDO;  Service: Endoscopy;  Laterality: N/A;    COLONOSCOPY N/A 2018    Procedure: COLONOSCOPY;  Surgeon: Frankie Sanchez MD;  Location: [facility] ENDO;  Service: Endoscopy;  Laterality: N/A;    HYSTERECTOMY      w/ BSO; hypermenorrhea    MOUTH SURGERY      OOPHORECTOMY      hyst/bso, hypermenorrhea    ROBOT-ASSISTED CHOLECYSTECTOMY USING DA DARI XI N/A 1/3/2020    Procedure: XI ROBOTIC CHOLECYSTECTOMY;  Surgeon: Damir Driscoll MD;  Location: [facility] OR;  Service: General;  Laterality: N/A;    TUBAL LIGATION       Family History   Problem Relation Age of Onset    Diabetes Mother     Hyperlipidemia Mother     Hypertension Mother     Asthma Mother     COPD Mother     Glaucoma Mother     Thyroid disease Mother     Anesthesia problems Mother         "almost had a cardiac arrest" , blood clots    Hypertension Father     Hyperlipidemia Father     Cancer Father         Brain, lung, liver, kidney    Heart disease " "Maternal Grandmother     Hyperlipidemia Maternal Grandmother     Hypertension Maternal Grandmother     Cataracts Maternal Grandmother     Diabetes Maternal Grandmother     Heart disease Maternal Grandfather     Hyperlipidemia Maternal Grandfather     Hypertension Maternal Grandfather     Glaucoma Maternal Grandfather     Cancer Maternal Grandfather     Cataracts Maternal Grandfather     Macular degeneration Maternal Grandfather     Diabetes Maternal Grandfather     Heart disease Paternal Grandmother     Hyperlipidemia Paternal Grandmother     Hypertension Paternal Grandmother     Cataracts Paternal Grandmother     Heart disease Paternal Grandfather     Hyperlipidemia Paternal Grandfather     Hypertension Paternal Grandfather     Cataracts Paternal Grandfather     Breast cancer Maternal Cousin     Breast cancer Maternal Cousin     Breast cancer Maternal Cousin     Breast cancer Maternal Cousin      Social History     Substance and Sexual Activity   Alcohol Use Yes    Alcohol/week: 0.0 standard drinks    Comment: socially  No alcohol 72h prior to sx     Social History     Tobacco Use   Smoking Status Never Smoker   Smokeless Tobacco Never Used     Social History     Substance and Sexual Activity   Drug Use No       Current Outpatient Medications:     ALPRAZolam (XANAX) 2 MG Tab, Take one tablet by mouth twice daily, Disp: 60 tablet, Rfl: 1    amLODIPine (NORVASC) 10 MG tablet, Take 10 mg by mouth once daily., Disp: , Rfl:     ARIPiprazole (ABILIFY) 10 MG Tab, Take one tablet by mouth once daily, Disp: 30 tablet, Rfl: 1    ARIPiprazole (ABILIFY) 10 MG Tab, Take 1 tablet (10 mg total) by mouth every evening., Disp: 30 tablet, Rfl: 11    aspirin (ECOTRIN) 81 MG EC tablet, Take 81 mg by mouth once daily., Disp: , Rfl:     atorvastatin (LIPITOR) 20 MG tablet, Take 1 tablet (20 mg total) by mouth every evening., Disp: 90 tablet, Rfl: 3    BD INSULIN SYRINGE ULTRA-FINE 1/2 mL 30 gauge x 1/2" " Syrg, , Disp: , Rfl: 0    benztropine (COGENTIN) 1 MG tablet, Take one tablet by mouth at bedtime, Disp: 30 tablet, Rfl: 1    benztropine (COGENTIN) 1 MG tablet, Take 1 tablet (1 mg total) by mouth nightly., Disp: , Rfl:     benztropine (COGENTIN) 1 MG tablet, Take 1 tablet (1 mg total) by mouth nightly., Disp: 30 tablet, Rfl: 11    blood sugar diagnostic Strp, Check blood glucose 4 times daily as directed and as needed (dispense insurance preferred brand or patient choice), Disp: 200 each, Rfl: 5    blood-glucose meter Misc, Use to check blood sugars, give meter based on insurance specification, Disp: 1 each, Rfl: 1    cyclobenzaprine (FLEXERIL) 10 MG tablet, Take 1 tablet (10 mg total) by mouth 3 (three) times daily as needed for Muscle spasms., Disp: 90 tablet, Rfl: 0    cycloSPORINE (RESTASIS) 0.05 % ophthalmic emulsion, Place 0.4 mLs (1 drop total) into both eyes 2 (two) times daily., Disp: 60 each, Rfl: 11    DULoxetine (CYMBALTA) 60 MG capsule, Take 1 capsule by mouth twice daily (Patient taking differently: Take 60 mg by mouth 2 (two) times daily. ), Disp: 60 capsule, Rfl: 0    DULoxetine (CYMBALTA) 60 MG capsule, Take one capsule by mouth twice daily, Disp: 60 capsule, Rfl: 1    DULoxetine (CYMBALTA) 60 MG capsule, Take 1 capsule (60 mg total) by mouth 2 (two) times daily., Disp: 60 capsule, Rfl: 11    ergocalciferol (ERGOCALCIFEROL) 50,000 unit Cap, Take 1 capsule (50,000 Units total) by mouth every 14 (fourteen) days., Disp: 4 capsule, Rfl: 5    fish oil-omega-3 fatty acids 300-1,000 mg capsule, Take 1 capsule by mouth once daily., Disp: , Rfl:     fluticasone propionate (FLONASE) 50 mcg/actuation nasal spray, 2 sprays (100 mcg total) by Each Nare route once daily. (Patient taking differently: 2 sprays by Each Nostril route daily as needed. ), Disp: 16 g, Rfl: 11    furosemide (LASIX) 20 MG tablet, Take 1 tablet (20 mg total) by mouth once daily., Disp: 30 tablet, Rfl: 11    gabapentin  (NEURONTIN) 300 MG capsule, Take 2 capsules (600 mg total) by mouth 3 (three) times daily., Disp: 90 capsule, Rfl: 3    hydrALAZINE (APRESOLINE) 10 MG tablet, Take 1 tablet (10 mg total) by mouth every 12 (twelve) hours., Disp: 60 tablet, Rfl: 11    insulin aspart U-100 (NOVOLOG FLEXPEN U-100 INSULIN) 100 unit/mL (3 mL) InPn pen, Inject before meals three times a day up to 30 units daily (Patient taking differently: Inject into the skin 3 (three) times daily with meals. Inject before meals three times a day up to 30 units daily), Disp: 15 mL, Rfl: 3    lancets 28 gauge Misc, use as directed 3 times daily, Disp: 100 each, Rfl: 11    LINZESS 145 mcg Cap capsule, Take 145 mcg by mouth as needed. , Disp: , Rfl: 3    liraglutide 0.6 mg/0.1 mL, 18 mg/3 mL, subq PNIJ (VICTOZA 2-MILENA) 0.6 mg/0.1 mL (18 mg/3 mL) PnIj, Use 1.8mg under the skin daily (Patient taking differently: Inject 1.8 mg into the skin once daily. Use 1.8mg under the skin daily), Disp: 9 mL, Rfl: 3    lurasidone (LATUDA) 120 mg Tab, Take 1 tablet by mouth at 5 pm with food (Patient taking differently: Take 120 mg by mouth nightly. ), Disp: 30 tablet, Rfl: 0    lurasidone 120 mg Tab, Take one tablet by mouth daily at 5:00pm with food, Disp: 30 tablet, Rfl: 1    magnesium oxide (MAG-OX) 400 mg (241.3 mg magnesium) tablet, Take 1 tablet (400 mg total) by mouth once daily., Disp: 30 tablet, Rfl: 3    metFORMIN (GLUCOPHAGE) 1000 MG tablet, TAKE 1 TABLET (1,000 MG TOTAL) BY MOUTH 2 (TWO) TIMES DAILY., Disp: 180 tablet, Rfl: 11    metoprolol succinate (TOPROL-XL) 100 MG 24 hr tablet, Take 2 tablets (200 mg total) by mouth every evening., Disp: 180 tablet, Rfl: 3    mirtazapine (REMERON) 45 MG tablet, Take one tablet by mouth at bedtime, Disp: 30 tablet, Rfl: 1    mometasone (ASMANEX TWISTHALER) 220 mcg (120 doses) AePB, Inhale 2 puffs into the lungs 2 (two) times daily. Controller, Disp: 1 each, Rfl: 11    MULTIVIT,CALC,MINS/IRON/FOLIC (DAILY MULTIPLE  "FOR WOMEN ORAL), Take 1 tablet by mouth once daily., Disp: , Rfl:     pen needle, diabetic (BD ULTRA-FINE MINI PEN NEEDLE) 31 gauge x 3/16" Ndle, Use 5 a day, Disp: 200 each, Rfl: 3    spironolactone (ALDACTONE) 25 MG tablet, Take 1 tablet (25 mg total) by mouth once daily., Disp: 30 tablet, Rfl: 6    topiramate (TOPAMAX) 50 MG tablet, Take 1 tablet (50 mg total) by mouth 2 (two) times daily., Disp: 60 tablet, Rfl: 11    TOUJEO SOLOSTAR U-300 INSULIN 300 unit/mL (1.5 mL) InPn pen, Inject 75 units daily (Patient taking differently: Inject 80 Units into the skin every evening. Inject 75 units daily), Disp: 4.5 mL, Rfl: 3    triazolam (HALCION) 0.25 MG Tab, Take 1 tablet by mouth at bedtime (Patient taking differently: Take 0.25 mg by mouth every evening. ), Disp: 14 tablet, Rfl: 0    triazolam (HALCION) 0.25 MG Tab, Take one tablet by mouth at bedtime, Disp: 30 tablet, Rfl: 1    valACYclovir (VALTREX) 1000 MG tablet, Take 1 tablet (1,000 mg total) by mouth once daily., Disp: 30 tablet, Rfl: 11    valsartan (DIOVAN) 320 MG tablet, Take 1 tablet (320 mg total) by mouth once daily., Disp: 90 tablet, Rfl: 3    frovatriptan (FROVA) 2.5 MG tablet, Take one tablet orally. If recurs, may repeat after 2 hours. Max of 3 tabs in 24 hours. (Patient not taking: Reported on 1/2/2020), Disp: 9 tablet, Rfl: 2    meclizine (ANTIVERT) 25 mg tablet, Take 1 tablet (25 mg total) by mouth 3 (three) times daily as needed. (Patient not taking: Reported on 1/2/2020), Disp: 21 tablet, Rfl: 0    nozaseptin (NOZIN) nasal , 1 each by Each Nostril route 2 (two) times daily. for 7 days (Patient not taking: Reported on 1/10/2020), Disp: 1 applicator, Rfl: 0    oxyCODONE-acetaminophen (PERCOCET) 5-325 mg per tablet, Take 1 tablet by mouth every 8 (eight) hours as needed for Pain. (Patient not taking: Reported on 1/2/2020), Disp: 10 tablet, Rfl: 0    oxyCODONE-acetaminophen (PERCOCET) 5-325 mg per tablet, Take 1 tablet by mouth " "every 6 (six) hours as needed (Pain). (Patient not taking: Reported on 1/10/2020), Disp: 20 tablet, Rfl: 0    promethazine (PHENERGAN) 25 MG tablet, Take 1 tablet (25 mg total) by mouth every 4 (four) hours. (Patient not taking: Reported on 1/10/2020), Disp: 20 tablet, Rfl: 1    promethazine (PHENERGAN) 25 MG tablet, Take one tablet by mouth every 8 hours as needed for nausea (sedating) (Patient not taking: Reported on 1/10/2020), Disp: 14 tablet, Rfl: 0    sulfamethoxazole-trimethoprim 800-160mg (BACTRIM DS) 800-160 mg Tab, Take 1 tablet by mouth twice daily for 10 days (Patient not taking: Reported on 1/10/2020), Disp: 20 tablet, Rfl: 0     Objective:         Vitals:    01/10/20 1004   BP: 125/76   Pulse: 93     Physical Exam   Constitutional: No distress.   Estimated body mass index is 44.43 kg/m² as calculated from the following:    Height as of this encounter: 4' 8" (1.422 m).    Weight as of this encounter: 89.9 kg (198 lb 3.1 oz).    Wt Readings from Last 1 Encounters:  01/10/20 1004 : 89.9 kg (198 lb 3.1 oz)     HENT:   Head: Normocephalic and atraumatic.   Eyes: Conjunctivae are normal. Pupils are equal, round, and reactive to light.   Neck: Normal range of motion.   Cardiovascular: Normal rate and intact distal pulses.    Pulmonary/Chest: Effort normal. No respiratory distress.   Abdominal: Soft. She exhibits no distension.   Neurological: She is alert. Gait normal.   Skin: No rash noted. No erythema.     Musculoskeletal: Normal range of motion. She exhibits tenderness (Bicipital tendons, worse left side.).   : strong  No synovitis or significant squeeze tenderness    AROM: intact pain reported upon shoulder abduction past 40° bilaterally, worse on left side.  PROM: intact    Rotator cuff maneuvers and Yergason's positive bilaterally.  Arm drop test negative.    Devices used by patient: none       Reviewed old and all outside pertinent medical records available.    All lab results personally " reviewed and interpreted by me.  Lab Results   Component Value Date    WBC 9.06 01/05/2020    HGB 10.1 (L) 01/05/2020    HCT 33.1 (L) 01/05/2020    MCV 90 01/05/2020    MCH 27.5 01/05/2020    MCHC 30.5 (L) 01/05/2020    RDW 14.6 (H) 01/05/2020     01/05/2020    MPV 9.9 01/05/2020       Lab Results   Component Value Date     01/05/2020    K 4.3 01/05/2020     01/05/2020    CO2 24 01/05/2020     (H) 01/05/2020    BUN 10 01/05/2020    CALCIUM 9.3 01/05/2020    PROT 6.8 01/05/2020    ALBUMIN 3.4 (L) 01/05/2020    BILITOT 0.2 01/05/2020    AST 15 01/05/2020    ALKPHOS 95 01/05/2020    ALT 23 01/05/2020       Lab Results   Component Value Date    COLORU Yellow 01/05/2020    APPEARANCEUA Clear 01/05/2020    SPECGRAV <=1.005 (A) 01/05/2020    PHUR 7.0 01/05/2020    PROTEINUA Negative 01/05/2020    KETONESU Negative 01/05/2020    LEUKOCYTESUR Negative 01/05/2020    NITRITE Negative 01/05/2020    UROBILINOGEN Negative 01/05/2020       Lab Results   Component Value Date    CRP 31.5 (H) 10/14/2019       Lab Results   Component Value Date    SEDRATE 67 (H) 10/14/2019       Lab Results   Component Value Date    RF <10.0 10/14/2019    SEDRATE 67 (H) 10/14/2019       No components found for: 25OHVITDTOT, 67LIKXVB6, 80UMBIHV2, METHODNOTE    Lab Results   Component Value Date    URICACID 6.0 (H) 08/12/2019       No components found for: TSPOTTB    Rheum Labs:  ISAAC negative  Rheumatoid factor/CCP negative     Infectious Labs:  n/a     Imaging:  All imaging reviewed and independently  interpreted by me.    Chest x-ray January 2020  The lungs are clear. The cardiac silhouette is within normal limits. The bones are intact.     ASSESSMENT / PLAN:     Yolanda Betts is a 49 y.o. Black or  female with:    1.  Shoulder pain  - presentation consistent with shoulder tendinopathy.  - recommend range of motion, flexibility, and strengthening exercises.  - corticosteroid injection provided today  -  resting affected joint for brief periods (<12 h)  - Acetaminophen prn -> standing up to 3 g per day-> NSAIDs short course  - recommend for physical therapy.  Patient reports not able to schedule at this time.    2. Fibromyalgia  - sleep hygiene, stress management  - continue with gabapentin as tolerated.   - continue with duloxetine.  I do not recommend more than 60 mg per day.  - manage expectations, reassurance, Physical activity    3. Other specified counseling  - over 10 minutes spent regarding below topics:  - Immunization counseling done.  - Weight loss counseling done.  - Nutrition and exercise counseling.  - Regular exercise:  Aerobic and resistance.  - Medication counseling provided.    Follow up in about 6 months (around 7/10/2020).    Method of contact with patient concerns: [facility]    Disclaimer:  This note is prepared using voice recognition software and as such is likely to have errors and has not been proof read. Please contact me for questions.     Time spent: 25 minutes in face to face discussion concerning diagnosis, prognosis, review of lab and test results, benefits of treatment as well as management of disease, counseling of patient and coordination of care between various health care providers.  Greater than half the time spent was used for coordination of care and counseling of patient.    Abilio Gibbs M.D.  Rheumatology Department   [facility] [place]

## 2020-01-10 NOTE — PATIENT INSTRUCTIONS
Shoulder Flexion (Flexibility)    1. Sit in a chair sideways next to a table. Lay your right arm on the table, pointed forward.  2. Slowly lean forward.  Slide your arm forward on the table. Feel the stretch in your right shoulder.  3. Hold for 5 seconds. Slowly sit back up.  4. Repeat 5 times.  5. Repeat this exercise 3 times a day, or as instructed.  Date Last Reviewed: 3/10/2016  © 4679-4514 [employer]. [address] [place] [address]. All rights reserved. This information is not intended as a substitute for professional medical care. Always follow your healthcare professional's instructions.        Shoulder Abduction (Flexibility)    6. Stand up straight. Or lie on your back on the floor with legs straight. Hold a broomstick horizontally. Cup the top of the broomstick with your right hand. Hold the broomstick just above the broom with your left hand, palm facing down.  7. Push the broomstick to the right with your left hand, raising your right arm up. Raise it only as high as feels comfortable.  8. Hold for a few seconds. Return to the starting position.  9. Repeat 3 to 5 times, or as instructed. Build up to holding each stretch for 10 to 30 seconds.     Tip: If you dont have a broom, you can also do this exercise with a cane, mop, or yardstick.      Date Last Reviewed: 3/10/2016  © 2809-0103 [employer]. [address] [place] [address]. All rights reserved. This information is not intended as a substitute for professional medical care. Always follow your healthcare professional's instructions.        Pendulum (Flexibility)    10. Lean over next to a table, with your left arm supporting your weight on the table.  11. Relax your right arm and let it hang straight down.  12. Slowly begin to swing your right arm in a small [place]. Gradually make the [place] bigger if you can. Change direction after 1 minute of motion.  13. Next, swing your right arm backward and  forward. Then move it side to side. Change direction after 1 minute of motion.  14. Repeat these movements for about 5 minutes.  15. Do this exercise 3 times a day, or as often as instructed.  Date Last Reviewed: 3/10/2016  © 1109-0996 [employer]. [address] [place] [address]. All rights reserved. This information is not intended as a substitute for professional medical care. Always follow your healthcare professional's instructions.        Understanding Rotator Cuff Tendonitis    Tendons are tough tissues that connect muscles to bone. A group of 4 muscles and their tendons form a cuff around the head of the upper arm bone. This is called the rotator cuff. It connects the upper arm to the shoulder blade. It gives the shoulder joint stability and strength.  If tendons are injured or strained, they may become irritated and swollen (inflamed). This is called tendonitis. Rotator cuff tendonitis may cause shoulder pain and loss of function.  What causes rotator cuff tendonitis?  Tendonitis results when the rotator cuff tendons are injured or overworked. The most common cause of injury is repetitive overhead activities. These can be work-related activities such as reaching, pushing, or lifting. Or they can be sports-related activities such as throwing, swimming, or lifting weights.  Symptoms of rotator cuff tendonitis  Pain on the side of the upper arm is the most common symptom. Pain may get worse with overhead movements or when you raise the arm above shoulder level. It may also hurt to lie on the shoulder at night.  Treatment for rotator cuff tendonitis  Treatment may include the following:  · Active rest. This lets the rotator cuff heal. Active rest means using your arm and shoulder, but avoiding activities that cause pain, such as reaching overhead or sleeping on the shoulder.  · Cold packs. Putting ice packs on the shoulder helps reduce swelling and relieve pain.  · Pain medicines.  Prescription or over-the-counter pain medicines can help relieve pain and swelling.  · Arm and shoulder exercises. These help keep the shoulder joint mobile as it heals. They also help improve the strength of muscles around the joint.  Possible complications  It might be tempting to stop using your shoulder completely to avoid pain. But doing so may lead to a condition called frozen shoulder. To help prevent this, following instructions you are given for active rest and for doing exercises to help your shoulder heal.  When to call your healthcare provider  Call your healthcare provider right away if you have any of these:  · Fever of 100.4°F (38°C) or higher, or as directed  · Symptoms that dont get better, or get worse  · New symptoms   Date Last Reviewed: 3/10/2016  © 3676-7035 [employer]. [address] [place] [address], [place] [address]. All rights reserved. This information is not intended as a substitute for professional medical care. Always follow your healthcare professional's instructions.        Shoulder Impingement Syndrome  The rotator cuff is a group of muscles and tendons that surround the shoulder joint. These muscles and tendons hold the arm in its joint. They help the shoulder move. The rotator cuff muscles and tendons can become irritated from repeated rubbing against the shoulder bone. This is called shoulder impingement syndrome or rotator cuff tendonitis.     If your case is mild, you may only need to rest the shoulder and then do certain exercises to strengthen the muscles. You can also take anti-inflammatory medicines. Steroid injections into the shoulder can ease inflammation. But you can have only a limited number of these. If the condition gets worse, your shoulder muscles may become thin and weak. This can lead to a rotator cuff tear.  Symptoms of shoulder impingement syndrome may include:  · Shoulder pain that gets worse when you raise your arm overhead  · Weakness of the  shoulder muscles when you use your arm overhead  · Popping and clicking when you move your shoulder  · Shoulder pain that wakes you up at night, especially when you sleep on the affected shoulder  · Sudden pain in your shoulder when you lift or reach  Home care  Follow these tips to take care of yourself at home:  · Avoid activities that make your pain worse. These include raising your arms overhead, repeating the same motion over and over, or lifting heavy objects.  · Dont hold your arm in one position for a long time. Keep it moving.  · Put an ice pack on the sore area for 20 minutes every 1 to 2 hours for the first day. You can make an ice pack by putting ice cubes in a plastic bag. Wrap the bag in a towel before putting it on your shoulder. A frozen bag of peas or something similar can also be used as an ice pack. Use the ice packs 3 to 4 times a day for the next 2 days. Continue using the ice to relieve of pain and swelling as needed.  · You may take acetaminophen or ibuprofen to control pain, unless another medicine was prescribed. If prednisone was prescribed, dont take anti-inflammatory medicines. If you have chronic liver or kidney disease or ever had a stomach ulcer or gastrointestinal bleeding, talk with your doctor before using these medicines.  · After your symptoms ease, you may get physical therapy or start a home exercise program. This can strengthen your shoulder muscles and help your range of motion. Talk with your doctor about what is best for your condition.  Follow-up care  Follow up with your healthcare provider, or as advised.  When to seek medical advice  Call your healthcare provider right away if any of these occur:  · Shoulder pain that gets worse and wakes you up at night  · Your shoulder or arm swells  · Numbness, tingling, or pain that travels down the arm to the hand  · Loss of shoulder strength  · Fever or chills  Date Last Reviewed: 8/1/2016  © 4381-8350 [employer]  [address] [place] [address]. All rights reserved. This information is not intended as a substitute for professional medical care. Always follow your healthcare professional's instructions.

## 2020-01-17 ENCOUNTER — HOSPITAL ENCOUNTER (OUTPATIENT)
Dept: RADIOLOGY | Facility: HOSPITAL | Age: 50
Discharge: HOME | End: 2020-01-17
Attending: INTERNAL MEDICINE
Payer: MEDICAID

## 2020-01-17 ENCOUNTER — OFFICE VISIT (OUTPATIENT)
Dept: SURGERY | Facility: CLINIC | Age: 50
End: 2020-01-17
Payer: MEDICAID

## 2020-01-17 ENCOUNTER — CLINICAL SUPPORT (OUTPATIENT)
Dept: PULMONOLOGY | Facility: CLINIC | Age: 50
End: 2020-01-17
Payer: MEDICAID

## 2020-01-17 ENCOUNTER — OFFICE VISIT (OUTPATIENT)
Dept: PULMONOLOGY | Facility: CLINIC | Age: 50
End: 2020-01-17
Payer: MEDICAID

## 2020-01-17 VITALS
DIASTOLIC BLOOD PRESSURE: 80 MMHG | WEIGHT: 193.31 LBS | OXYGEN SATURATION: 97 % | HEIGHT: 56 IN | BODY MASS INDEX: 43.48 KG/M2 | RESPIRATION RATE: 18 BRPM | SYSTOLIC BLOOD PRESSURE: 134 MMHG | HEART RATE: 77 BPM

## 2020-01-17 VITALS
BODY MASS INDEX: 44.54 KG/M2 | SYSTOLIC BLOOD PRESSURE: 119 MMHG | HEIGHT: 56 IN | WEIGHT: 198 LBS | DIASTOLIC BLOOD PRESSURE: 76 MMHG | TEMPERATURE: 98 F | HEART RATE: 76 BPM

## 2020-01-17 DIAGNOSIS — I10 ESSENTIAL HYPERTENSION: Chronic | ICD-10-CM

## 2020-01-17 DIAGNOSIS — I15.2 HYPERTENSION COMPLICATING DIABETES: Chronic | ICD-10-CM

## 2020-01-17 DIAGNOSIS — E11.42 DIABETIC POLYNEUROPATHY ASSOCIATED WITH TYPE 2 DIABETES MELLITUS: Chronic | ICD-10-CM

## 2020-01-17 DIAGNOSIS — Z98.890 POST-OPERATIVE STATE: Primary | ICD-10-CM

## 2020-01-17 DIAGNOSIS — J45.30 MILD PERSISTENT ASTHMA WITHOUT COMPLICATION: Primary | Chronic | ICD-10-CM

## 2020-01-17 DIAGNOSIS — E11.69 DYSLIPIDEMIA ASSOCIATED WITH TYPE 2 DIABETES MELLITUS: Chronic | ICD-10-CM

## 2020-01-17 DIAGNOSIS — E11.65 TYPE 2 DIABETES MELLITUS WITH HYPERGLYCEMIA, WITH LONG-TERM CURRENT USE OF INSULIN: Chronic | ICD-10-CM

## 2020-01-17 DIAGNOSIS — J45.30 MILD PERSISTENT ASTHMA WITHOUT COMPLICATION: Chronic | ICD-10-CM

## 2020-01-17 DIAGNOSIS — E66.9 NOCTURNAL HYPOXEMIA DUE TO OBESITY: Chronic | ICD-10-CM

## 2020-01-17 DIAGNOSIS — E11.59 HYPERTENSION COMPLICATING DIABETES: Chronic | ICD-10-CM

## 2020-01-17 DIAGNOSIS — J45.30 MILD PERSISTENT ASTHMA WITHOUT COMPLICATION: ICD-10-CM

## 2020-01-17 DIAGNOSIS — Z79.4 TYPE 2 DIABETES MELLITUS WITH HYPERGLYCEMIA, WITH LONG-TERM CURRENT USE OF INSULIN: Chronic | ICD-10-CM

## 2020-01-17 DIAGNOSIS — E78.1 HYPERTRIGLYCERIDEMIA: Chronic | ICD-10-CM

## 2020-01-17 DIAGNOSIS — G47.36 NOCTURNAL HYPOXEMIA DUE TO OBESITY: Chronic | ICD-10-CM

## 2020-01-17 DIAGNOSIS — E78.5 DYSLIPIDEMIA ASSOCIATED WITH TYPE 2 DIABETES MELLITUS: Chronic | ICD-10-CM

## 2020-01-17 DIAGNOSIS — E66.2 OBESITY HYPOVENTILATION SYNDROME: ICD-10-CM

## 2020-01-17 PROCEDURE — 99214 OFFICE O/P EST MOD 30 MIN: CPT | Mod: 25,S$PBB,, | Performed by: INTERNAL MEDICINE

## 2020-01-17 PROCEDURE — 99214 PR OFFICE/OUTPT VISIT, EST, LEVL IV, 30-39 MIN: ICD-10-PCS | Mod: 25,S$PBB,, | Performed by: INTERNAL MEDICINE

## 2020-01-17 PROCEDURE — 71046 X-RAY EXAM CHEST 2 VIEWS: CPT | Mod: TC

## 2020-01-17 PROCEDURE — 71046 X-RAY EXAM CHEST 2 VIEWS: CPT | Mod: 26,,, | Performed by: RADIOLOGY

## 2020-01-17 PROCEDURE — 94060 EVALUATION OF WHEEZING: CPT | Mod: PBBFAC

## 2020-01-17 PROCEDURE — 99999 PR PBB SHADOW E&M-EST. PATIENT-LVL III: ICD-10-PCS | Mod: PBBFAC,,, | Performed by: PHYSICIAN ASSISTANT

## 2020-01-17 PROCEDURE — 99215 OFFICE O/P EST HI 40 MIN: CPT | Mod: PBBFAC,25,27 | Performed by: INTERNAL MEDICINE

## 2020-01-17 PROCEDURE — 99213 OFFICE O/P EST LOW 20 MIN: CPT | Mod: PBBFAC,25 | Performed by: PHYSICIAN ASSISTANT

## 2020-01-17 PROCEDURE — 99024 PR POST-OP FOLLOW-UP VISIT: ICD-10-PCS | Mod: ,,, | Performed by: PHYSICIAN ASSISTANT

## 2020-01-17 PROCEDURE — 99999 PR PBB SHADOW E&M-EST. PATIENT-LVL III: CPT | Mod: PBBFAC,,, | Performed by: PHYSICIAN ASSISTANT

## 2020-01-17 PROCEDURE — 99024 POSTOP FOLLOW-UP VISIT: CPT | Mod: ,,, | Performed by: PHYSICIAN ASSISTANT

## 2020-01-17 PROCEDURE — 99999 PR PBB SHADOW E&M-EST. PATIENT-LVL V: CPT | Mod: PBBFAC,,, | Performed by: INTERNAL MEDICINE

## 2020-01-17 PROCEDURE — 94060 EVALUATION OF WHEEZING: CPT | Mod: 26,S$PBB,, | Performed by: INTERNAL MEDICINE

## 2020-01-17 PROCEDURE — 71046 XR CHEST PA AND LATERAL: ICD-10-PCS | Mod: 26,,, | Performed by: RADIOLOGY

## 2020-01-17 PROCEDURE — 94060 PR EVAL OF BRONCHOSPASM: ICD-10-PCS | Mod: 26,S$PBB,, | Performed by: INTERNAL MEDICINE

## 2020-01-17 PROCEDURE — 99999 PR PBB SHADOW E&M-EST. PATIENT-LVL V: ICD-10-PCS | Mod: PBBFAC,,, | Performed by: INTERNAL MEDICINE

## 2020-01-17 NOTE — ASSESSMENT & PLAN NOTE
General weight loss/lifestyle modification strategies discussed (elicit support from others; identify saboteurs; non-food rewards).  Diet interventions: low calorie (1000 kCal/d) deficit diet
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
## 2020-01-17 NOTE — PROGRESS NOTES
Yolanda Betts is status post robotic cholecystectomy and presents today for follow-up care.  She is doing well and has no complaints. She has occasional diarrhea if she eats a fatty meal    PE: Abdomen is soft, non-tender, non-distended.  Incisions clean, dry, and intact.    Pathology: cholelithiasis    A/P:  Normal post-operative course.    The patient is instructed to avoid heavy lifting until 4 weeks after the surgery date.  Return to clinic as needed.

## 2020-01-20 LAB
BRPFT: NORMAL
FEF 25 75 CHG: 22.6 %
FEF 25 75 LLN: 1.04
FEF 25 75 POST REF: 107.5 %
FEF 25 75 PRE REF: 87.7 %
FEF 25 75 REF: 2.07
FET100 CHG: -6.4 %
FEV1 CHG: 7.4 %
FEV1 FVC CHG: 4.3 %
FEV1 FVC LLN: 72
FEV1 FVC POST REF: 105.1 %
FEV1 FVC PRE REF: 100.8 %
FEV1 FVC REF: 83
FEV1 LLN: 1.39
FEV1 POST REF: 92.1 %
FEV1 PRE REF: 85.8 %
FEV1 REF: 1.83
FVC CHG: 3 %
FVC LLN: 1.7
FVC POST REF: 87.7 %
FVC PRE REF: 85.1 %
FVC REF: 2.21
PEF CHG: 2.9 %
PEF LLN: 3.57
PEF POST REF: 82.9 %
PEF PRE REF: 80.6 %
PEF REF: 5.1
POST FEF 25 75: 2.23 L/S (ref 1.04–3.11)
POST FET 100: 7 SEC
POST FEV1 FVC: 86.76 % (ref 71.63–93.4)
POST FEV1: 1.68 L (ref 1.39–2.26)
POST FVC: 1.94 L (ref 1.7–2.72)
POST PEF: 4.22 L/S (ref 3.57–6.63)
PRE FEF 25 75: 1.82 L/S (ref 1.04–3.11)
PRE FET 100: 7.48 SEC
PRE FEV1 FVC: 83.18 % (ref 71.63–93.4)
PRE FEV1: 1.57 L (ref 1.39–2.26)
PRE FVC: 1.89 L (ref 1.7–2.72)
PRE PEF: 4.11 L/S (ref 3.57–6.63)

## 2020-02-04 ENCOUNTER — OFFICE VISIT (OUTPATIENT)
Dept: INTERNAL MEDICINE | Facility: CLINIC | Age: 50
End: 2020-02-04
Payer: MEDICAID

## 2020-02-04 VITALS
HEART RATE: 96 BPM | DIASTOLIC BLOOD PRESSURE: 74 MMHG | BODY MASS INDEX: 43.48 KG/M2 | SYSTOLIC BLOOD PRESSURE: 122 MMHG | OXYGEN SATURATION: 96 % | HEIGHT: 56 IN | TEMPERATURE: 96 F | WEIGHT: 193.31 LBS

## 2020-02-04 DIAGNOSIS — E78.5 DYSLIPIDEMIA ASSOCIATED WITH TYPE 2 DIABETES MELLITUS: Chronic | ICD-10-CM

## 2020-02-04 DIAGNOSIS — E11.65 TYPE 2 DIABETES MELLITUS WITH HYPERGLYCEMIA, WITH LONG-TERM CURRENT USE OF INSULIN: Primary | Chronic | ICD-10-CM

## 2020-02-04 DIAGNOSIS — Z91.199 NONCOMPLIANCE: Chronic | ICD-10-CM

## 2020-02-04 DIAGNOSIS — I10 ESSENTIAL HYPERTENSION: Chronic | ICD-10-CM

## 2020-02-04 DIAGNOSIS — F25.0 SCHIZOAFFECTIVE DISORDER, BIPOLAR TYPE: Chronic | ICD-10-CM

## 2020-02-04 DIAGNOSIS — Z79.4 TYPE 2 DIABETES MELLITUS WITH HYPERGLYCEMIA, WITH LONG-TERM CURRENT USE OF INSULIN: Primary | Chronic | ICD-10-CM

## 2020-02-04 DIAGNOSIS — E66.01 MORBID OBESITY WITH BMI OF 40.0-44.9, ADULT: Chronic | ICD-10-CM

## 2020-02-04 DIAGNOSIS — D50.8 IRON DEFICIENCY ANEMIA SECONDARY TO INADEQUATE DIETARY IRON INTAKE: ICD-10-CM

## 2020-02-04 DIAGNOSIS — F31.9 BIPOLAR 1 DISORDER: Chronic | ICD-10-CM

## 2020-02-04 DIAGNOSIS — E11.69 DYSLIPIDEMIA ASSOCIATED WITH TYPE 2 DIABETES MELLITUS: Chronic | ICD-10-CM

## 2020-02-04 PROCEDURE — 99214 PR OFFICE/OUTPT VISIT, EST, LEVL IV, 30-39 MIN: ICD-10-PCS | Mod: S$PBB,,, | Performed by: FAMILY MEDICINE

## 2020-02-04 PROCEDURE — 99999 PR PBB SHADOW E&M-EST. PATIENT-LVL III: ICD-10-PCS | Mod: PBBFAC,,, | Performed by: FAMILY MEDICINE

## 2020-02-04 PROCEDURE — 99213 OFFICE O/P EST LOW 20 MIN: CPT | Mod: PBBFAC | Performed by: FAMILY MEDICINE

## 2020-02-04 PROCEDURE — 99214 OFFICE O/P EST MOD 30 MIN: CPT | Mod: S$PBB,,, | Performed by: FAMILY MEDICINE

## 2020-02-04 PROCEDURE — 99999 PR PBB SHADOW E&M-EST. PATIENT-LVL III: CPT | Mod: PBBFAC,,, | Performed by: FAMILY MEDICINE

## 2020-02-04 RX ORDER — TRIAZOLAM 0.25 MG/1
0.12 TABLET ORAL NIGHTLY PRN
COMMUNITY
End: 2020-04-03 | Stop reason: SDUPTHER

## 2020-02-04 RX ORDER — DULOXETIN HYDROCHLORIDE 60 MG/1
60 CAPSULE, DELAYED RELEASE ORAL 2 TIMES DAILY
COMMUNITY
End: 2020-04-03 | Stop reason: SDUPTHER

## 2020-02-04 RX ORDER — BENZTROPINE MESYLATE 1 MG/1
1 TABLET ORAL DAILY
COMMUNITY
End: 2020-04-03 | Stop reason: SDUPTHER

## 2020-02-04 RX ORDER — ALPRAZOLAM 2 MG/1
1 TABLET ORAL 2 TIMES DAILY PRN
COMMUNITY
End: 2020-04-03 | Stop reason: SDUPTHER

## 2020-02-04 RX ORDER — ARIPIPRAZOLE 10 MG/1
10 TABLET ORAL NIGHTLY
COMMUNITY
End: 2020-04-03 | Stop reason: SDUPTHER

## 2020-02-04 RX ORDER — INSULIN GLARGINE 300 U/ML
80 INJECTION, SOLUTION SUBCUTANEOUS NIGHTLY
Qty: 9 ML | Refills: 0 | Status: SHIPPED | OUTPATIENT
Start: 2020-02-04 | End: 2020-03-16

## 2020-02-04 NOTE — ASSESSMENT & PLAN NOTE
Diabetes Management Status    Statin: Taking  ACE/ARB: Taking    Screening or Prevention Patient's value Goal Complete/Controlled?   HgA1C Testing and Control   Lab Results   Component Value Date    HGBA1C 9.9 (H) 11/18/2019      Annually/Less than 8% No   Lipid profile : 04/25/2019 Annually Yes   LDL control Lab Results   Component Value Date    LDLCALC 40.4 (L) 04/25/2019    Annually/Less than 100 mg/dl  Yes   Nephropathy screening Lab Results   Component Value Date    LABMICR 7.0 04/25/2019     Lab Results   Component Value Date    PROTEINUA Negative 01/05/2020    Annually Yes   Blood pressure BP Readings from Last 1 Encounters:   02/04/20 122/74    Less than 140/90 Yes   Dilated retinal exam : 05/22/2019 Annually Yes   Foot exam   : 05/16/2019 Annually Yes     Lab Results   Component Value Date    HGBA1C 9.9 (H) 11/18/2019    HGBA1C 8.4 (H) 10/14/2019    HGBA1C 7.7 (H) 06/24/2019    ESTGFRAFRICA >60 01/05/2020    EGFRNONAA >60 01/05/2020    MICALBCREAT 10.4 04/25/2019    LDLCALC 40.4 (L) 04/25/2019       HEALTH MAINTENANCE: Diabetic health maintenance interventions reviewed and are up to date except for:  There are no preventive care reminders to display for this patient.     Uncontrolled. Improved based on verbal report of home glycemic monitoring. She has not seen endocrinology. She did not bring glycemic record.

## 2020-02-04 NOTE — ASSESSMENT & PLAN NOTE
Wt Readings from Last 15 Encounters:   02/04/20 87.7 kg (193 lb 5.5 oz)   01/17/20 87.7 kg (193 lb 5.5 oz)   01/17/20 89.8 kg (198 lb)   01/10/20 89.9 kg (198 lb 3.1 oz)   01/05/20 93.9 kg (207 lb 0.2 oz)   01/03/20 91.2 kg (201 lb 1 oz)   01/02/20 92.7 kg (204 lb 5.9 oz)   12/30/19 92.4 kg (203 lb 11.3 oz)   12/30/19 92.9 kg (204 lb 12.9 oz)   12/29/19 90.2 kg (198 lb 13.7 oz)   12/28/19 89.8 kg (198 lb 1.3 oz)   12/18/19 91.8 kg (202 lb 6.1 oz)   12/16/19 91.2 kg (201 lb)   12/09/19 91.4 kg (201 lb 8 oz)   12/03/19 93.1 kg (205 lb 2.2 oz)      Improved with therapeutic lifestyle changes.

## 2020-02-04 NOTE — PATIENT INSTRUCTIONS
Reschedule your diabetes clinic appointment ASAP.  Check your blood glucose 4 times a day and record those measurements in your gill and bring that record with you to all your appointments.

## 2020-02-04 NOTE — ASSESSMENT & PLAN NOTE
BP Readings from Last 6 Encounters:   02/04/20 122/74   01/17/20 134/80   01/17/20 119/76   01/10/20 125/76   01/06/20 (!) 121/56   01/04/20 (!) 103/56     Lab Results   Component Value Date    ESTGFRAFRICA >60 01/05/2020    EGFRNONAA >60 01/05/2020    CREATININE 0.7 01/05/2020    BUN 10 01/05/2020    K 4.3 01/05/2020     01/05/2020     01/05/2020   Well controlled, stable

## 2020-02-04 NOTE — ASSESSMENT & PLAN NOTE
Lab Results   Component Value Date    WBC 9.06 01/05/2020    WBC 9.89 12/28/2019    RBC 3.67 (L) 01/05/2020    RBC 4.22 12/28/2019    HGB 10.1 (L) 01/05/2020    HGB 11.6 (L) 12/28/2019    HCT 33.1 (L) 01/05/2020    HCT 37.0 12/28/2019     01/05/2020     12/28/2019     Lab Results   Component Value Date    MCV 90 01/05/2020    MCH 27.5 01/05/2020    MCHC 30.5 (L) 01/05/2020    RDW 14.6 (H) 01/05/2020     Lab Results   Component Value Date    FERRITIN 35 03/31/2017    IRON 39 03/31/2017    TRANSFERRIN 335 03/31/2017    TIBC 496 (H) 03/31/2017    FESATURATED 8 (L) 03/31/2017    RFZQFCDE05 426 03/31/2017    FOLATE 10.0 03/31/2017     Lab Results   Component Value Date    LABPROT 9.7 11/23/2016    INR 0.9 11/23/2016    APTT 27.6 11/23/2016      Mildly worse. No bleeding problems reported.

## 2020-02-04 NOTE — PROGRESS NOTES
CHIEF COMPLAINT  Follow-up and Diabetes      HISTORY, ASSESSMENT, and PLAN    1. Type 2 diabetes mellitus with hyperglycemia, with long-term current use of insulin    2. Essential hypertension    3. Iron deficiency anemia secondary to inadequate dietary iron intake    4. Dyslipidemia associated with type 2 diabetes mellitus    5. Morbid obesity with BMI of 40.0-44.9, adult    6. High risk of appointment cancellation or no-show    7. Schizoaffective disorder, bipolar type    8. Bipolar 1 disorder        Orders Placed This Encounter    TOUJEO SOLOSTAR U-300 INSULIN 300 unit/mL (1.5 mL) InPn pen        Problem List Items Addressed This Visit        Psychiatric    Bipolar 1 disorder (Chronic)    Overview     PSYCHIATRIST: Dr. Sabillon         Current Assessment & Plan     Compensated/controlled and stable.          Relevant Medications    ALPRAZolam (XANAX) 2 MG Tab    ARIPiprazole (ABILIFY) 10 MG Tab    triazolam (HALCION) 0.25 MG Tab    benztropine (COGENTIN) 1 MG tablet    DULoxetine (CYMBALTA) 60 MG capsule    Schizoaffective disorder, bipolar type (Chronic)    Overview     PSYCHIATRIST: Dr. Sabillon         Current Assessment & Plan     Compensated/controlled and stable.          Relevant Medications    ALPRAZolam (XANAX) 2 MG Tab    ARIPiprazole (ABILIFY) 10 MG Tab    triazolam (HALCION) 0.25 MG Tab    benztropine (COGENTIN) 1 MG tablet    DULoxetine (CYMBALTA) 60 MG capsule    High risk of appointment cancellation or no-show (Chronic)       Cardiac/Vascular    Essential hypertension (Chronic)    Current Assessment & Plan     BP Readings from Last 6 Encounters:   02/04/20 122/74   01/17/20 134/80   01/17/20 119/76   01/10/20 125/76   01/06/20 (!) 121/56   01/04/20 (!) 103/56     Lab Results   Component Value Date    ESTGFRAFRICA >60 01/05/2020    EGFRNONAA >60 01/05/2020    CREATININE 0.7 01/05/2020    BUN 10 01/05/2020    K 4.3 01/05/2020     01/05/2020     01/05/2020   Well controlled, stable           Dyslipidemia associated with type 2 diabetes mellitus (Chronic)    Current Assessment & Plan     Lab Results   Component Value Date    CHOL 142 04/25/2019    CHOL 102 (L) 12/22/2017    TRIG 328 (H) 04/25/2019    TRIG 127 12/22/2017    HDL 36 (L) 04/25/2019    HDL 43 12/22/2017    LDLCALC 40.4 (L) 04/25/2019    LDLCALC 33.6 (L) 12/22/2017    NONHDLCHOL 106 04/25/2019    NONHDLCHOL 59 12/22/2017    AST 15 01/05/2020    ALT 23 01/05/2020     The 10-year ASCVD risk score (Jose TRENT Jr., et al., 2013) is: 7.6%    Values used to calculate the score:      Age: 49 years      Sex: Female      Is Non- : Yes      Diabetic: Yes      Tobacco smoker: No      Systolic Blood Pressure: 122 mmHg      Is BP treated: Yes      HDL Cholesterol: 36 mg/dL      Total Cholesterol: 142 mg/dL     LDL at goal. She appears to be tolerating statin for dyslipidemia without apparent symptoms of myositis or hepatic dysfunction.          Relevant Medications    TOUJEO SOLOSTAR U-300 INSULIN 300 unit/mL (1.5 mL) InPn pen       Oncology    Iron deficiency anemia    Current Assessment & Plan     Lab Results   Component Value Date    WBC 9.06 01/05/2020    WBC 9.89 12/28/2019    RBC 3.67 (L) 01/05/2020    RBC 4.22 12/28/2019    HGB 10.1 (L) 01/05/2020    HGB 11.6 (L) 12/28/2019    HCT 33.1 (L) 01/05/2020    HCT 37.0 12/28/2019     01/05/2020     12/28/2019     Lab Results   Component Value Date    MCV 90 01/05/2020    MCH 27.5 01/05/2020    MCHC 30.5 (L) 01/05/2020    RDW 14.6 (H) 01/05/2020     Lab Results   Component Value Date    FERRITIN 35 03/31/2017    IRON 39 03/31/2017    TRANSFERRIN 335 03/31/2017    TIBC 496 (H) 03/31/2017    FESATURATED 8 (L) 03/31/2017    [id number] 426 03/31/2017    FOLATE 10.0 03/31/2017     Lab Results   Component Value Date    LABPROT 9.7 11/23/2016    INR 0.9 11/23/2016    APTT 27.6 11/23/2016      Mildly worse. No bleeding problems reported.            Endocrine    Type 2 diabetes  mellitus with hyperglycemia, with long-term current use of insulin - Primary (Chronic)    Current Assessment & Plan     Diabetes Management Status    Statin: Taking  ACE/ARB: Taking    Screening or Prevention Patient's value Goal Complete/Controlled?   HgA1C Testing and Control   Lab Results   Component Value Date    HGBA1C 9.9 (H) 11/18/2019      Annually/Less than 8% No   Lipid profile : 04/25/2019 Annually Yes   LDL control Lab Results   Component Value Date    LDLCALC 40.4 (L) 04/25/2019    Annually/Less than 100 mg/dl  Yes   Nephropathy screening Lab Results   Component Value Date    LABMICR 7.0 04/25/2019     Lab Results   Component Value Date    PROTEINUA Negative 01/05/2020    Annually Yes   Blood pressure BP Readings from Last 1 Encounters:   02/04/20 122/74    Less than 140/90 Yes   Dilated retinal exam : 05/22/2019 Annually Yes   Foot exam   : 05/16/2019 Annually Yes     Lab Results   Component Value Date    HGBA1C 9.9 (H) 11/18/2019    HGBA1C 8.4 (H) 10/14/2019    HGBA1C 7.7 (H) 06/24/2019    ESTGFRAFRICA >60 01/05/2020    EGFRNONAA >60 01/05/2020    MICALBCREAT 10.4 04/25/2019    LDLCALC 40.4 (L) 04/25/2019       HEALTH MAINTENANCE: Diabetic health maintenance interventions reviewed and are up to date except for:  There are no preventive care reminders to display for this patient.     Uncontrolled. Improved based on verbal report of home glycemic monitoring. She has not seen endocrinology. She did not bring glycemic record.         Relevant Medications    TOUJEO SOLOSTAR U-300 INSULIN 300 unit/mL (1.5 mL) InPn pen    Morbid obesity with BMI of 40.0-44.9, adult (Chronic)    Current Assessment & Plan     Wt Readings from Last 15 Encounters:   02/04/20 87.7 kg (193 lb 5.5 oz)   01/17/20 87.7 kg (193 lb 5.5 oz)   01/17/20 89.8 kg (198 lb)   01/10/20 89.9 kg (198 lb 3.1 oz)   01/05/20 93.9 kg (207 lb 0.2 oz)   01/03/20 91.2 kg (201 lb 1 oz)   01/02/20 92.7 kg (204 lb 5.9 oz)   12/30/19 92.4 kg (203 lb 11.3  "oz)   12/30/19 92.9 kg (204 lb 12.9 oz)   12/29/19 90.2 kg (198 lb 13.7 oz)   12/28/19 89.8 kg (198 lb 1.3 oz)   12/18/19 91.8 kg (202 lb 6.1 oz)   12/16/19 91.2 kg (201 lb)   12/09/19 91.4 kg (201 lb 8 oz)   12/03/19 93.1 kg (205 lb 2.2 oz)      Improved with therapeutic lifestyle changes.                MEDICATION MANAGMENT    Outpatient Medications Prior to Visit   Medication Sig Dispense Refill    ALPRAZolam (XANAX) 2 MG Tab Take 1 tablet by mouth 2 (two) times daily as needed.      amLODIPine (NORVASC) 10 MG tablet Take 10 mg by mouth once daily.      ARIPiprazole (ABILIFY) 10 MG Tab Take 10 mg by mouth every evening.      aspirin (ECOTRIN) 81 MG EC tablet Take 81 mg by mouth once daily.      atorvastatin (LIPITOR) 20 MG tablet Take 1 tablet (20 mg total) by mouth every evening. 90 tablet 3    BD INSULIN SYRINGE ULTRA-FINE 1/2 mL 30 gauge x 1/2" Syrg   0    benztropine (COGENTIN) 1 MG tablet Take 1 mg by mouth once daily.      blood sugar diagnostic Strp Check blood glucose 4 times daily as directed and as needed (dispense insurance preferred brand or patient choice) 200 each 5    blood-glucose meter Misc Use to check blood sugars, give meter based on insurance specification 1 each 1    cycloSPORINE (RESTASIS) 0.05 % ophthalmic emulsion Place 0.4 mLs (1 drop total) into both eyes 2 (two) times daily. 60 each 11    DULoxetine (CYMBALTA) 60 MG capsule Take 1 capsule (60 mg total) by mouth 2 (two) times daily. 60 capsule 11    DULoxetine (CYMBALTA) 60 MG capsule Take 60 mg by mouth 2 (two) times daily.      ergocalciferol (ERGOCALCIFEROL) 50,000 unit Cap Take 1 capsule (50,000 Units total) by mouth every 14 (fourteen) days. 4 capsule 5    fish oil-omega-3 fatty acids 300-1,000 mg capsule Take 1 capsule by mouth once daily.      fluticasone propionate (FLONASE) 50 mcg/actuation nasal spray 2 sprays (100 mcg total) by Each Nare route once daily. (Patient taking differently: 2 sprays by Each Nostril " route daily as needed. ) 16 g 11    furosemide (LASIX) 20 MG tablet Take 1 tablet (20 mg total) by mouth once daily. 30 tablet 11    gabapentin (NEURONTIN) 300 MG capsule Take 2 capsules (600 mg total) by mouth 3 (three) times daily. 90 capsule 3    hydrALAZINE (APRESOLINE) 10 MG tablet Take 1 tablet (10 mg total) by mouth every 12 (twelve) hours. 60 tablet 11    insulin aspart U-100 (NOVOLOG FLEXPEN U-100 INSULIN) 100 unit/mL (3 mL) InPn pen Inject before meals three times a day up to 30 units daily (Patient taking differently: Inject into the skin 3 (three) times daily with meals. Inject before meals three times a day up to 30 units daily) 15 mL 3    lancets 28 gauge Misc use as directed 3 times daily 100 each 11    LINZESS 145 mcg Cap capsule Take 145 mcg by mouth as needed.   3    liraglutide 0.6 mg/0.1 mL, 18 mg/3 mL, subq PNIJ (VICTOZA 2-MILENA) 0.6 mg/0.1 mL (18 mg/3 mL) PnIj Use 1.8mg under the skin daily (Patient taking differently: Inject 1.8 mg into the skin once daily. Use 1.8mg under the skin daily) 9 mL 3    lurasidone (LATUDA) 120 mg Tab Take 1 tablet by mouth at 5 pm with food (Patient taking differently: Take 120 mg by mouth nightly. ) 30 tablet 0    lurasidone 120 mg Tab Take one tablet by mouth daily at 5:00pm with food 30 tablet 1    magnesium oxide (MAG-OX) 400 mg (241.3 mg magnesium) tablet Take 1 tablet (400 mg total) by mouth once daily. 30 tablet 3    metFORMIN (GLUCOPHAGE) 1000 MG tablet TAKE 1 TABLET (1,000 MG TOTAL) BY MOUTH 2 (TWO) TIMES DAILY. 180 tablet 11    metoprolol succinate (TOPROL-XL) 100 MG 24 hr tablet Take 2 tablets (200 mg total) by mouth every evening. 180 tablet 3    mometasone (ASMANEX TWISTHALER) 220 mcg (120 doses) AePB Inhale 2 puffs into the lungs 2 (two) times daily. Controller 1 each 11    MULTIVIT,CALC,MINS/IRON/FOLIC (DAILY MULTIPLE FOR WOMEN ORAL) Take 1 tablet by mouth once daily.      pen needle, diabetic (BD ULTRA-FINE MINI PEN NEEDLE) 31 gauge x  "3/16" Ndle Use 5 a day 200 each 3    spironolactone (ALDACTONE) 25 MG tablet Take 1 tablet (25 mg total) by mouth once daily. 30 tablet 6    topiramate (TOPAMAX) 50 MG tablet Take 1 tablet (50 mg total) by mouth 2 (two) times daily. 60 tablet 11    triazolam (HALCION) 0.25 MG Tab Take 0.125 mg by mouth nightly as needed.      valACYclovir (VALTREX) 1000 MG tablet Take 1 tablet (1,000 mg total) by mouth once daily. 30 tablet 11    valsartan (DIOVAN) 320 MG tablet Take 1 tablet (320 mg total) by mouth once daily. 90 tablet 3    ALPRAZolam (XANAX) 2 MG Tab Take one tablet by mouth twice daily 60 tablet 1    ARIPiprazole (ABILIFY) 10 MG Tab Take 1 tablet (10 mg total) by mouth every evening. 30 tablet 11    benztropine (COGENTIN) 1 MG tablet Take 1 tablet (1 mg total) by mouth nightly. 30 tablet 11    mirtazapine (REMERON) 45 MG tablet Take one tablet by mouth at bedtime 30 tablet 1    promethazine (PHENERGAN) 25 MG tablet Take 1 tablet (25 mg total) by mouth every 4 (four) hours. 20 tablet 1    TOUJEO SOLOSTAR U-300 INSULIN 300 unit/mL (1.5 mL) InPn pen Inject 75 units daily (Patient taking differently: Inject 80 Units into the skin every evening. Inject 75 units daily) 4.5 mL 3    triazolam (HALCION) 0.25 MG Tab Take one tablet by mouth at bedtime 30 tablet 1    ARIPiprazole (ABILIFY) 10 MG Tab Take one tablet by mouth once daily (Patient not taking: Reported on 2/4/2020) 30 tablet 1    benztropine (COGENTIN) 1 MG tablet Take one tablet by mouth at bedtime (Patient not taking: Reported on 2/4/2020) 30 tablet 1    benztropine (COGENTIN) 1 MG tablet Take 1 tablet (1 mg total) by mouth nightly. (Patient not taking: Reported on 2/4/2020)      DULoxetine (CYMBALTA) 60 MG capsule Take 1 capsule by mouth twice daily (Patient not taking: Reported on 2/4/2020) 60 capsule 0    DULoxetine (CYMBALTA) 60 MG capsule Take one capsule by mouth twice daily (Patient not taking: Reported on 2/4/2020) 60 capsule 1    " frovatriptan (FROVA) 2.5 MG tablet Take one tablet orally. If recurs, may repeat after 2 hours. Max of 3 tabs in 24 hours. (Patient not taking: Reported on 2/4/2020) 9 tablet 2    meclizine (ANTIVERT) 25 mg tablet Take 1 tablet (25 mg total) by mouth 3 (three) times daily as needed. (Patient not taking: Reported on 1/2/2020) 21 tablet 0    oxyCODONE-acetaminophen (PERCOCET) 5-325 mg per tablet Take 1 tablet by mouth every 8 (eight) hours as needed for Pain. (Patient not taking: Reported on 2/4/2020) 10 tablet 0    oxyCODONE-acetaminophen (PERCOCET) 5-325 mg per tablet Take 1 tablet by mouth every 6 (six) hours as needed (Pain). (Patient not taking: Reported on 2/4/2020) 20 tablet 0    promethazine (PHENERGAN) 25 MG tablet Take one tablet by mouth every 8 hours as needed for nausea (sedating) (Patient not taking: Reported on 2/4/2020) 14 tablet 0    sulfamethoxazole-trimethoprim 800-160mg (BACTRIM DS) 800-160 mg Tab Take 1 tablet by mouth twice daily for 10 days (Patient not taking: Reported on 2/4/2020) 20 tablet 0    triazolam (HALCION) 0.25 MG Tab Take 1 tablet by mouth at bedtime (Patient not taking: Reported on 2/4/2020) 14 tablet 0     No facility-administered medications prior to visit.         Medications Discontinued During This Encounter   Medication Reason    frovatriptan (FROVA) 2.5 MG tablet Patient no longer taking    meclizine (ANTIVERT) 25 mg tablet Patient no longer taking    oxyCODONE-acetaminophen (PERCOCET) 5-325 mg per tablet Patient no longer taking    oxyCODONE-acetaminophen (PERCOCET) 5-325 mg per tablet Patient no longer taking    promethazine (PHENERGAN) 25 MG tablet Patient no longer taking    promethazine (PHENERGAN) 25 MG tablet Patient no longer taking    sulfamethoxazole-trimethoprim 800-160mg (BACTRIM DS) 800-160 mg Tab Patient no longer taking    triazolam (HALCION) 0.25 MG Tab Other - Commment Required    triazolam (HALCION) 0.25 MG Tab Other - Commment Required     "DULoxetine (CYMBALTA) 60 MG capsule Other - Commment Required    DULoxetine (CYMBALTA) 60 MG capsule Other - Commment Required    benztropine (COGENTIN) 1 MG tablet Other - Commment Required    benztropine (COGENTIN) 1 MG tablet Other - Commment Required    benztropine (COGENTIN) 1 MG tablet Other - Commment Required    ARIPiprazole (ABILIFY) 10 MG Tab Other - Commment Required    ARIPiprazole (ABILIFY) 10 MG Tab Other - Commment Required    ALPRAZolam (XANAX) 2 MG Tab Other - Commment Required    mirtazapine (REMERON) 45 MG tablet Other - Commment Required    TOUJEO SOLOSTAR U-300 INSULIN 300 unit/mL (1.5 mL) InPn pen Reorder        Medications Ordered This Encounter   Medications    TOUJEO SOLOSTAR U-300 INSULIN 300 unit/mL (1.5 mL) InPn pen     Sig: Inject 80 Units into the skin every evening.     Dispense:  9 mL     Refill:  0     Failing preferred insulins, has hyperglycemia, abcess, vaginitis as complications of uncontrolled diabetes        FOLLOW-UP  Follow up in about 2 weeks (around 2/18/2020) for re-evaluate problem(s) discussed today.     REVIEW OF SYSTEMS  ENDOCRINE: No polyuria or polydipsia reported.   CONSTITUTIONAL: No fever reported.  CARDIOVASCULAR: No chest pain reported.  PULMONARY: No trouble breathing reported.     PHYSICAL EXAM  Vitals:    02/04/20 1359   BP: 122/74   BP Location: Left arm   Patient Position: Sitting   BP Method: Large (Manual)   Pulse: 96   Temp: 96.4 °F (35.8 °C)   SpO2: 96%   Weight: 87.7 kg (193 lb 5.5 oz)   Height: 4' 8" (1.422 m)     CONSTITUTIONAL: Vital signs noted. No apparent distress. Does not appear acutely ill or septic. Appears adequately hydrated.  ENT: Oropharynx moist.  PULM: Breathing unlabored.  CV: Heart regular.  DERM: Skin normothermic.  PSYCHIATRIC: Alert and conversant. Grossly oriented. Mood is grossly neutral. Affect appropriate. Judgment and insight not grossly compromised.   TOTAL TIME evaluating and managing this patient for this " "encounter exceeded 25 minutes, the majority spent counseling and coordinating care for the listed diagnoses.   Documentation entered by me for this encounter may have been done in part using speech-recognition technology. Although I have made an effort to ensure accuracy, "sound like" errors may exist and should be interpreted in context. -DOMINIK Beach MD.   "

## 2020-02-04 NOTE — Clinical Note
Yolanda missed her appointment with you because she had to have her gallbladder taken out. I would appreciate it if you would reach out to her to reschedule soon. Thanks.

## 2020-02-04 NOTE — ASSESSMENT & PLAN NOTE
Lab Results   Component Value Date    CHOL 142 04/25/2019    CHOL 102 (L) 12/22/2017    TRIG 328 (H) 04/25/2019    TRIG 127 12/22/2017    HDL 36 (L) 04/25/2019    HDL 43 12/22/2017    LDLCALC 40.4 (L) 04/25/2019    LDLCALC 33.6 (L) 12/22/2017    NONHDLCHOL 106 04/25/2019    NONHDLCHOL 59 12/22/2017    AST 15 01/05/2020    ALT 23 01/05/2020     The 10-year ASCVD risk score (Kathleensofie TRENT Jr., et al., 2013) is: 7.6%    Values used to calculate the score:      Age: 49 years      Sex: Female      Is Non- : Yes      Diabetic: Yes      Tobacco smoker: No      Systolic Blood Pressure: 122 mmHg      Is BP treated: Yes      HDL Cholesterol: 36 mg/dL      Total Cholesterol: 142 mg/dL     LDL at goal. She appears to be tolerating statin for dyslipidemia without apparent symptoms of myositis or hepatic dysfunction.

## 2020-02-05 ENCOUNTER — PATIENT MESSAGE (OUTPATIENT)
Dept: ENDOCRINOLOGY | Facility: CLINIC | Age: 50
End: 2020-02-05

## 2020-02-07 DIAGNOSIS — E78.5 DYSLIPIDEMIA ASSOCIATED WITH TYPE 2 DIABETES MELLITUS: Primary | Chronic | ICD-10-CM

## 2020-02-07 DIAGNOSIS — D50.8 IRON DEFICIENCY ANEMIA SECONDARY TO INADEQUATE DIETARY IRON INTAKE: ICD-10-CM

## 2020-02-07 DIAGNOSIS — E11.69 DYSLIPIDEMIA ASSOCIATED WITH TYPE 2 DIABETES MELLITUS: Primary | Chronic | ICD-10-CM

## 2020-02-07 DIAGNOSIS — E11.65 TYPE 2 DIABETES MELLITUS WITH HYPERGLYCEMIA, WITH LONG-TERM CURRENT USE OF INSULIN: Chronic | ICD-10-CM

## 2020-02-07 DIAGNOSIS — Z79.4 TYPE 2 DIABETES MELLITUS WITH HYPERGLYCEMIA, WITH LONG-TERM CURRENT USE OF INSULIN: Chronic | ICD-10-CM

## 2020-02-07 DIAGNOSIS — E55.9 VITAMIN D INSUFFICIENCY: ICD-10-CM

## 2020-02-07 DIAGNOSIS — E55.9 VITAMIN D DEFICIENCY: Chronic | ICD-10-CM

## 2020-02-10 RX ORDER — ERGOCALCIFEROL 1.25 MG/1
50000 CAPSULE ORAL
Qty: 4 CAPSULE | Refills: 5 | Status: SHIPPED | OUTPATIENT
Start: 2020-02-10 | End: 2021-07-15

## 2020-02-10 NOTE — TELEPHONE ENCOUNTER
ACTION NEEDED: Please contact her to schedule labs do be done a couple of days before her next appointment with me, currently scheduled for Tuesday, February 18, 2020 at 11:20 AM.    Orders Placed This Encounter    Hemoglobin A1c    Lipid panel    CBC auto differential    Vitamin D    Ferritin    Iron and TIBC      REFILL APPROVED      Requested Prescriptions     Signed Prescriptions Disp Refills    ergocalciferol (ERGOCALCIFEROL) 50,000 unit Cap 4 capsule 5     Sig: Take 1 capsule (50,000 Units total) by mouth every 14 (fourteen) days.     Authorizing Provider: DOMINIK SHEN

## 2020-02-10 NOTE — TELEPHONE ENCOUNTER
Spoke with patient and informed her of labs that Dr. Beach would like her to do before her appointment with him on 2/18/20. She verbalized understanding and we scheduled the labs for 2/13/20.

## 2020-02-13 ENCOUNTER — PATIENT OUTREACH (OUTPATIENT)
Dept: OTHER | Facility: OTHER | Age: 50
End: 2020-02-13

## 2020-02-13 NOTE — PROGRESS NOTES
Digital Medicine: Health  Follow-Up    Patient is doing better. Patient is still recovering from rashid bladder removal as well as having shoulder issues. Patient has not submitted a reading since 12/1819. Patient had temporarily stopped taking readings while she was recovering. Patient has had plenty of in office readings from 1/10/20 through 2/4/20 with no readings higher than a 134/80. Reminded patient that while those readings are great they unfortunately do not factor into 30 day average with our program. Patient understood and plans to start taking readings again soon. Will f/u in 4 weeks.    The history is provided by the patient.     Follow Up  Follow-up reason(s): reading review and routine education      Readings are missing.   patient reminder needed.      INTERVENTION(S)  recommend physical activity, reviewed monitoring technique and encouragement/support      There are no preventive care reminders to display for this patient.    Last 5 Patient Entered Readings                                      Current 30 Day Average:      Recent Readings 12/18/2019 12/10/2019 12/8/2019 12/6/2019 12/4/2019    SBP (mmHg) 155 155 151 164 159    DBP (mmHg) 97 97 100 106 100    Pulse 97 97 95 92 88                      Diet Screening       Patient reports diet is better but did not go into much detail.     Physical Activity Screening   When asked if exercising, patient responded: yes    Patient participates in the following activities: walking and yard/housework    She identified the following barriers to physical activity: pain/injury/recent surgery    Patient is still limited and gets most of her activity around the house. Patient states that once she is back on her feet she plans on incorporating daily activity into her life.     Medication Adherence Screening   She did not miss a dose this month.    Patient identified the following reasons for non-compliance: None    Patient reports that she is having thinning hair  but she is not sure if that is related to BP medication or not. Patient is experiencing no other side effects and has no issues taking BP medications as prescribed.       SDOH

## 2020-02-18 ENCOUNTER — OFFICE VISIT (OUTPATIENT)
Dept: INTERNAL MEDICINE | Facility: CLINIC | Age: 50
End: 2020-02-18
Payer: MEDICAID

## 2020-02-18 ENCOUNTER — TELEPHONE (OUTPATIENT)
Dept: INTERNAL MEDICINE | Facility: CLINIC | Age: 50
End: 2020-02-18

## 2020-02-18 ENCOUNTER — LAB VISIT (OUTPATIENT)
Dept: LAB | Facility: HOSPITAL | Age: 50
End: 2020-02-18
Attending: FAMILY MEDICINE
Payer: MEDICAID

## 2020-02-18 ENCOUNTER — NUTRITION (OUTPATIENT)
Dept: DIABETES | Facility: CLINIC | Age: 50
End: 2020-02-18
Payer: MEDICAID

## 2020-02-18 ENCOUNTER — TELEPHONE (OUTPATIENT)
Dept: ENDOCRINOLOGY | Facility: CLINIC | Age: 50
End: 2020-02-18

## 2020-02-18 VITALS
WEIGHT: 198.19 LBS | DIASTOLIC BLOOD PRESSURE: 76 MMHG | HEART RATE: 93 BPM | TEMPERATURE: 97 F | HEIGHT: 56 IN | BODY MASS INDEX: 44.58 KG/M2 | SYSTOLIC BLOOD PRESSURE: 118 MMHG | OXYGEN SATURATION: 95 %

## 2020-02-18 VITALS — HEIGHT: 56 IN | WEIGHT: 198.19 LBS | BODY MASS INDEX: 44.58 KG/M2

## 2020-02-18 DIAGNOSIS — E11.65 TYPE 2 DIABETES MELLITUS WITH HYPERGLYCEMIA, WITH LONG-TERM CURRENT USE OF INSULIN: Chronic | ICD-10-CM

## 2020-02-18 DIAGNOSIS — E11.69 DYSLIPIDEMIA ASSOCIATED WITH TYPE 2 DIABETES MELLITUS: Chronic | ICD-10-CM

## 2020-02-18 DIAGNOSIS — E78.5 DYSLIPIDEMIA ASSOCIATED WITH TYPE 2 DIABETES MELLITUS: Chronic | ICD-10-CM

## 2020-02-18 DIAGNOSIS — E11.65 UNCONTROLLED TYPE 2 DIABETES MELLITUS WITH HYPERGLYCEMIA: Primary | ICD-10-CM

## 2020-02-18 DIAGNOSIS — K59.09 CHRONIC CONSTIPATION: ICD-10-CM

## 2020-02-18 DIAGNOSIS — Z79.4 ENCOUNTER FOR LONG-TERM (CURRENT) USE OF INSULIN: ICD-10-CM

## 2020-02-18 DIAGNOSIS — E11.59 HYPERTENSION COMPLICATING DIABETES: Primary | Chronic | ICD-10-CM

## 2020-02-18 DIAGNOSIS — E66.01 MORBID OBESITY WITH BMI OF 40.0-44.9, ADULT: Chronic | ICD-10-CM

## 2020-02-18 DIAGNOSIS — I15.2 HYPERTENSION COMPLICATING DIABETES: Primary | Chronic | ICD-10-CM

## 2020-02-18 DIAGNOSIS — Z79.4 TYPE 2 DIABETES MELLITUS WITH HYPERGLYCEMIA, WITH LONG-TERM CURRENT USE OF INSULIN: Chronic | ICD-10-CM

## 2020-02-18 DIAGNOSIS — D50.8 IRON DEFICIENCY ANEMIA SECONDARY TO INADEQUATE DIETARY IRON INTAKE: ICD-10-CM

## 2020-02-18 DIAGNOSIS — E55.9 VITAMIN D DEFICIENCY: Chronic | ICD-10-CM

## 2020-02-18 DIAGNOSIS — E11.42 DIABETIC POLYNEUROPATHY ASSOCIATED WITH TYPE 2 DIABETES MELLITUS: Chronic | ICD-10-CM

## 2020-02-18 LAB
25(OH)D3+25(OH)D2 SERPL-MCNC: 24 NG/ML (ref 30–96)
BASOPHILS # BLD AUTO: 0.06 K/UL (ref 0–0.2)
BASOPHILS NFR BLD: 0.8 % (ref 0–1.9)
CHOLEST SERPL-MCNC: 126 MG/DL (ref 120–199)
CHOLEST/HDLC SERPL: 2.7 {RATIO} (ref 2–5)
DIFFERENTIAL METHOD: ABNORMAL
EOSINOPHIL # BLD AUTO: 0.2 K/UL (ref 0–0.5)
EOSINOPHIL NFR BLD: 1.9 % (ref 0–8)
ERYTHROCYTE [DISTWIDTH] IN BLOOD BY AUTOMATED COUNT: 15.7 % (ref 11.5–14.5)
ESTIMATED AVG GLUCOSE: 157 MG/DL (ref 68–131)
FERRITIN SERPL-MCNC: 62 NG/ML (ref 20–300)
HBA1C MFR BLD HPLC: 7.1 % (ref 4–5.6)
HCT VFR BLD AUTO: 38.8 % (ref 37–48.5)
HDLC SERPL-MCNC: 46 MG/DL (ref 40–75)
HDLC SERPL: 36.5 % (ref 20–50)
HGB BLD-MCNC: 11.2 G/DL (ref 12–16)
IMM GRANULOCYTES # BLD AUTO: 0.03 K/UL (ref 0–0.04)
IMM GRANULOCYTES NFR BLD AUTO: 0.4 % (ref 0–0.5)
IRON SERPL-MCNC: 77 UG/DL (ref 30–160)
LDLC SERPL CALC-MCNC: 49.6 MG/DL (ref 63–159)
LYMPHOCYTES # BLD AUTO: 3 K/UL (ref 1–4.8)
LYMPHOCYTES NFR BLD: 39.2 % (ref 18–48)
MCH RBC QN AUTO: 27.1 PG (ref 27–31)
MCHC RBC AUTO-ENTMCNC: 28.9 G/DL (ref 32–36)
MCV RBC AUTO: 94 FL (ref 82–98)
MONOCYTES # BLD AUTO: 0.3 K/UL (ref 0.3–1)
MONOCYTES NFR BLD: 4 % (ref 4–15)
NEUTROPHILS # BLD AUTO: 4.1 K/UL (ref 1.8–7.7)
NEUTROPHILS NFR BLD: 53.7 % (ref 38–73)
NONHDLC SERPL-MCNC: 80 MG/DL
NRBC BLD-RTO: 0 /100 WBC
PLATELET # BLD AUTO: 370 K/UL (ref 150–350)
PMV BLD AUTO: 10.1 FL (ref 9.2–12.9)
RBC # BLD AUTO: 4.14 M/UL (ref 4–5.4)
SATURATED IRON: 19 % (ref 20–50)
TOTAL IRON BINDING CAPACITY: 400 UG/DL (ref 250–450)
TRANSFERRIN SERPL-MCNC: 270 MG/DL (ref 200–375)
TRIGL SERPL-MCNC: 152 MG/DL (ref 30–150)
WBC # BLD AUTO: 7.71 K/UL (ref 3.9–12.7)

## 2020-02-18 PROCEDURE — 99999 PR PBB SHADOW E&M-EST. PATIENT-LVL III: ICD-10-PCS | Mod: PBBFAC,,, | Performed by: FAMILY MEDICINE

## 2020-02-18 PROCEDURE — 82306 VITAMIN D 25 HYDROXY: CPT

## 2020-02-18 PROCEDURE — 99999 PR PBB SHADOW E&M-EST. PATIENT-LVL IV: CPT | Mod: PBBFAC,,, | Performed by: DIETITIAN, REGISTERED

## 2020-02-18 PROCEDURE — 99214 PR OFFICE/OUTPT VISIT, EST, LEVL IV, 30-39 MIN: ICD-10-PCS | Mod: S$PBB,,, | Performed by: FAMILY MEDICINE

## 2020-02-18 PROCEDURE — G0108 DIAB MANAGE TRN  PER INDIV: HCPCS | Mod: PBBFAC | Performed by: DIETITIAN, REGISTERED

## 2020-02-18 PROCEDURE — 85025 COMPLETE CBC W/AUTO DIFF WBC: CPT

## 2020-02-18 PROCEDURE — 80061 LIPID PANEL: CPT

## 2020-02-18 PROCEDURE — 99999 PR PBB SHADOW E&M-EST. PATIENT-LVL IV: ICD-10-PCS | Mod: PBBFAC,,, | Performed by: DIETITIAN, REGISTERED

## 2020-02-18 PROCEDURE — 99214 OFFICE O/P EST MOD 30 MIN: CPT | Mod: PBBFAC | Performed by: DIETITIAN, REGISTERED

## 2020-02-18 PROCEDURE — 83036 HEMOGLOBIN GLYCOSYLATED A1C: CPT

## 2020-02-18 PROCEDURE — 83540 ASSAY OF IRON: CPT

## 2020-02-18 PROCEDURE — 99999 PR PBB SHADOW E&M-EST. PATIENT-LVL III: CPT | Mod: PBBFAC,,, | Performed by: FAMILY MEDICINE

## 2020-02-18 PROCEDURE — 99214 OFFICE O/P EST MOD 30 MIN: CPT | Mod: S$PBB,,, | Performed by: FAMILY MEDICINE

## 2020-02-18 PROCEDURE — 36415 COLL VENOUS BLD VENIPUNCTURE: CPT

## 2020-02-18 PROCEDURE — 82728 ASSAY OF FERRITIN: CPT

## 2020-02-18 PROCEDURE — 99213 OFFICE O/P EST LOW 20 MIN: CPT | Mod: PBBFAC | Performed by: FAMILY MEDICINE

## 2020-02-18 NOTE — ASSESSMENT & PLAN NOTE
Much improved based on her report of home BG readings. Labs pending. Diabetes clinic appointment this afternoon.

## 2020-02-18 NOTE — LETTER
February 18, 2020        DOMINIK Beach MD  [address] [facility] [address] [facility] [address] [facility]  [place] [address]             [facility]  [address] [address]  Phone: [phone]  Fax: [phone]   Patient: Yolanda Betts   MR Number: [id number]   YOB: 1970   Date of Visit: 2/18/2020       Dear Dr. Beach:    Thank you for referring Yolanda Betts to me for evaluation. Below are the relevant portions of my assessment and plan of care.     If you have questions, please do not hesitate to call me. I look forward to following Yolanda along with you.    Sincerely,      Yara Snachez RD, CDE           CC  Malka Severino MD

## 2020-02-18 NOTE — PROGRESS NOTES
Diabetes Education  Author: Yara Sanchez RD, CDE  Date: 2/18/2020    Diabetes Care Management Summary  Diabetes Education Record Assessment/Progress: Comprehensive/Group  Current Diabetes Risk Level: Moderate     Last A1c:   Lab Results   Component Value Date    HGBA1C 9.9 (H) 11/18/2019     Last visit with Diabetes Educator:  Initial assessment visit [facility]o 6/13/19 w/ A1C 7.7 (6/24/19)    Diabetes Type  Diabetes Type : Type II(Noted recent gallbladder surgery  )    Diabetes History  Diabetes Diagnosis: >10 years(>20 yrs)  Current Treatment: Oral Medication, Injectable, Exercise, Insulin(Metformin 1000mg 1tab twice daily, victoza 1.8 mg daily, toujeo 80 units daily, novolog ac 15units +s/s)  Reviewed Problem List with Patient: Yes    Health Maintenance was reviewed today with patient. Discussed with patient importance of routine eye exams, foot exams/foot care, blood work (i.e.: A1c, microalbumin, and lipid), dental visits, yearly flu vaccine, and pneumonia vaccine as indicated by PCP. Patient verbalized understanding.     Health Maintenance Topics with due status: Not Due       Topic Last Completion Date    TETANUS VACCINE 12/21/2017    Colonoscopy 02/28/2018    Lipid Panel 04/25/2019    Mammogram 05/02/2019    Foot Exam 05/16/2019    Eye Exam 05/22/2019    Hemoglobin A1c 11/18/2019     There are no preventive care reminders to display for this patient.    Nutrition  Meal Planning: (Intake ~9182-5743 cals/d; wt decreased ~4 lbs since Dec '19. Pt reducing carb, fat and sodium. She's avoiding fast foods, decreasing starch portions, meal prepping and planning to try healthier MR entrees. )  Meal Plan 24 Hour Recall - Breakfast: 1pkt instant oatmeal w/ 1/4 cup rolled oats w/ walnuts OR eggs, 2slc toast (wheat) - water  Meal Plan 24 Hour Recall - Lunch: hamburger deepa (homemade) w/ whole wheat bread, pickle OR bkd chix w/ corn/peas   Meal Plan 24 Hour Recall - Dinner: bkd chix, dirty rice, green beans   Meal  Plan 24 Hour Recall - Snack: marco: water     Monitoring   Self Monitoring : Per glucometer review, fst BG ; acl , 151; acd , 147.   Blood Glucose Logs: No  In the last month, how often have you had a low blood sugar reaction?: never    Exercise   Frequency: Never    Current Diabetes Treatment   Current Treatment: Oral Medication, Injectable, Exercise, Insulin(Metformin 1000mg 1tab twice daily, victoza 1.8 mg daily, toujeo 80 units daily, novolog ac 15units +s/s)    Social History  Preferred Learning Method: Face to Face  Primary Support: Self  Smoking Status: Never a Smoker  Alcohol Use: Rarely     Barriers to Change  Barriers to Change: None  Learning Challenges : None    Readiness to Learn   Readiness to Learn : Eager    Cultural Influences  Cultural Influences: No    Diabetes Education Assessment/Progress  Diabetes Disease Process (diabetes disease process and treatment options): Demonstrates Understanding/Competency(verbalizes/demonstrates)  Nutrition (Incorporating nutritional management into one's lifestyle): Discussion, Individual Session, Demonstrates Understanding/Competency (verbalizes/demonstrates)  Physical Activity (incorporating physical activity into one's lifestyle): Discussion, Individual Session, Demonstrates Understanding/Competency (verbalizes/demonstrates)  Medications (states correct name, dose, onset, peak, duration, side effects & timing of meds): Discussion, Individual Session, Demonstrates Understanding/Competency(verbalizes/demonstrates), Written Materials Provided  Monitoring (monitoring blood glucose/other parameters & using results): Discussion, Individual Session, Demonstrates Understanding/Competency (verbalizes/demonstrates), Written Materials Provided  Acute Complications (preventing, detecting, and treating acute complications): Discussion, Individual Session, Demonstrates Understanding/Competency (verbalizes/demonstrates)  Chronic Complications (preventing,  detecting, and treating chronic complications): Demonstrates Understanding/Competency (verbalizes/demonstrates)  Clinical (diabetes, other pertinent medical history, and relevant comorbidities reviewed during visit): Demonstrates Understanding/Competency (verbalizes/demonstrates)  Cognitive (knowledge of self-management skills, functional health literacy): Demonstrates Understanding/Competency (verbalizes/demonstrates)  Psychosocial (emotional response to diabetes): Not Covered/Deferred  Diabetes Distress and Support Systems: Not Covered/Deferred  Behavioral (readiness for change, lifestyle practices, self-care behaviors): Discussion, Individual Session, Demonstrates Understanding/Competency (verbalizes/demonstrates)    Goals  Patient has selected/evaluated goals during today's session: Yes, evaluated  Healthy Eating: Set(use meal plan - carb portions/spacing, low-sodium seasonings (custom written seasoning, food/meal/marco lists); avoid fast foods and sugary marco;  shake 1-2 x/d for meal regularity and improve protein intake for wound healing.)  Met Percentage : 75%(contiue to use meal plan - carb portions/spacing, rec seasonings, avoid fast foods)  Start Date: 02/18/20  Target Date: 05/18/20  Physical Activity: Set  Start Date: 02/18/20(150min/wk - chair exercises or exercise bands)  Target Date: 05/18/20  Monitoring: Set(test BG fst, acl, acd, bed; bring records to clinic; upload to [facility] weekly)  Met Percentage : 75%  Start Date: 02/18/20(continue test BG 4x/d; consider Dexcom CGMS)  Target Date: 05/18/20         Diabetes Care Plan/Intervention  Education Plan/Intervention: Individual Follow-Up DSMT, Endocrine Provider Visit Set Up(Maintain visits w/ Dr. Severino. ) Pt may benefit from Dexcom CGMS; reviewed system benefits and applications. She will discuss Dexcom more w/ Dr. Severino. Encouraged continued meal plan improvements and assisted w/ exercise goal setting. DSMT RTC 3mos, sooner prn or for Dexcom training,  as referred. Will complete distress scale next visit due time needed for counseling.     Diabetes Meal Plan  Restrictions: Low Fat, Low Sodium, Restricted Carbohydrate  Calories: 1400  Carbohydrate Per Meal: 30-45g  Carbohydrate Per Snack : 7-15g    Today's Self-Management Care Plan was developed with the patient's input and is based on barriers identified during today's assessment.    The long and short-term goals in the care plan were written with the patient/caregiver's input. The patient has agreed to work toward these goals to improve her overall diabetes control.      The patient received a copy of today's self-management plan and verbalized understanding of the care plan, goals, and all of today's instructions.      The patient was encouraged to communicate with her physician and care team regarding her condition(s) and treatment.  I provided the patient with my contact information today and encouraged her to contact me via phone or patient portal as needed.     Education Units of Time   Time Spent: 30 min

## 2020-02-18 NOTE — ASSESSMENT & PLAN NOTE
BP Readings from Last 6 Encounters:   02/18/20 118/76   02/04/20 122/74   01/17/20 134/80   01/17/20 119/76   01/10/20 125/76   01/06/20 (!) 121/56   Well controlled, stable.

## 2020-02-18 NOTE — ASSESSMENT & PLAN NOTE
DIABETIC FOOT EXAM: Inspection of feet reveals no open wounds, ulcerations, significant calluses, or evidence of arterial insufficiency. 10g monofilament sensation is ABSENT in all 3 of 5 locations tested on the right and ABSENT in all 3 of 5 locations tested on the left.  Foot protection education provided.

## 2020-02-18 NOTE — PROGRESS NOTES
CHIEF COMPLAINT  Follow-up      SUMMARY OF DIAGNOSES AND ASSOCIATED ORDERS    1. Hypertension complicating diabetes    2. Type 2 diabetes mellitus with hyperglycemia, with long-term current use of insulin    3. Diabetic polyneuropathy associated with type 2 diabetes mellitus  - Ambulatory referral/consult to Podiatry; Future    4. Chronic constipation  - linaCLOtide (LINZESS) 145 mcg Cap capsule; Take 1 capsule (145 mcg total) by mouth daily as needed (for constipation).  Dispense: 30 capsule; Refill: 11    5. Morbid obesity with BMI of 40.0-44.9, adult       ADDITIONAL HISTORY OF PRESENT ILLNESS  Problem List Items Addressed This Visit        Neuro    Diabetic polyneuropathy associated with type 2 diabetes mellitus (Chronic)    Current Assessment & Plan     DIABETIC FOOT EXAM: Inspection of feet reveals no open wounds, ulcerations, significant calluses, or evidence of arterial insufficiency. 10g monofilament sensation is ABSENT in all 3 of 5 locations tested on the right and ABSENT in all 3 of 5 locations tested on the left.  Foot protection education provided.         Relevant Orders    Ambulatory referral/consult to Podiatry       Cardiac/Vascular    Hypertension complicating diabetes - Primary (Chronic)    Current Assessment & Plan     BP Readings from Last 6 Encounters:   02/18/20 118/76   02/04/20 122/74   01/17/20 134/80   01/17/20 119/76   01/10/20 125/76   01/06/20 (!) 121/56   Well controlled, stable.            Endocrine    Morbid obesity with BMI of 40.0-44.9, adult (Chronic)    Current Assessment & Plan     Wt Readings from Last 6 Encounters:   02/18/20 89.9 kg (198 lb 3.1 oz)   02/04/20 87.7 kg (193 lb 5.5 oz)   01/17/20 87.7 kg (193 lb 5.5 oz)   01/17/20 89.8 kg (198 lb)   01/10/20 89.9 kg (198 lb 3.1 oz)   01/05/20 93.9 kg (207 lb 0.2 oz)     BMI Readings from Last 2 Encounters:   02/18/20 44.43 kg/m²   02/04/20 43.35 kg/m²     Worse. Therapeutic lifestyle changes encouraged.            Type 2  diabetes mellitus with hyperglycemia, with long-term current use of insulin (Chronic)    Current Assessment & Plan     Much improved based on her report of home BG readings. Labs pending. Diabetes clinic appointment this afternoon.             GI    Chronic constipation    Overview     Treated with Linzess since 2015.         Current Assessment & Plan     Compensated/controlled and stable.         Relevant Medications    linaCLOtide (LINZESS) 145 mcg Cap capsule          MEDICATION MANAGMENT    MEDICATIONS SUMMARY: I have changed Yolanda Betts's Linzess to linaCLOtide. I am also having her maintain her aspirin, BD Insulin Syringe Ultra-Fine, (MULTIVIT,CALC,MINS/IRON/FOLIC (DAILY MULTIPLE FOR WOMEN ORAL)), fish oil-omega-3 fatty acids, atorvastatin, metFORMIN, lancets, fluticasone propionate, cycloSPORINE, mometasone, amLODIPine, valsartan, metoprolol succinate, blood-glucose meter, blood sugar diagnostic, furosemide, magnesium oxide, lurasidone, topiramate, (pen needle, diabetic), liraglutide 0.6 mg/0.1 mL (18 mg/3 mL) subq PNIJ, insulin aspart U-100, valACYclovir, gabapentin, spironolactone, hydrALAZINE, lurasidone, DULoxetine, Toujeo SoloStar U-300 Insulin, ALPRAZolam, ARIPiprazole, triazolam, benztropine, DULoxetine, ergocalciferol, amLODIPine, triazolam, ALPRAZolam, DULoxetine, mirtazapine, and benztropine.    Outpatient Medications Prior to Visit   Medication Sig Dispense Refill    ALPRAZolam (XANAX) 2 MG Tab Take 1 tablet by mouth 2 (two) times daily as needed.      ALPRAZolam (XANAX) 2 MG Tab take one tablet by mouth twice daily 60 tablet 0    amLODIPine (NORVASC) 10 MG tablet Take 10 mg by mouth once daily.      amLODIPine (NORVASC) 10 MG tablet Take 1 tablet (10 mg total) by mouth once daily. 30 tablet 3    ARIPiprazole (ABILIFY) 10 MG Tab Take 10 mg by mouth every evening.      aspirin (ECOTRIN) 81 MG EC tablet Take 81 mg by mouth once daily.      atorvastatin (LIPITOR) 20 MG tablet Take 1 tablet  "(20 mg total) by mouth every evening. 90 tablet 3    BD INSULIN SYRINGE ULTRA-FINE 1/2 mL 30 gauge x 1/2" Syrg   0    benztropine (COGENTIN) 1 MG tablet Take 1 mg by mouth once daily.      benztropine (COGENTIN) 1 MG tablet take one tablet by mouth at bedtime 30 tablet 0    blood sugar diagnostic Strp Check blood glucose 4 times daily as directed and as needed (dispense insurance preferred brand or patient choice) 200 each 5    blood-glucose meter Misc Use to check blood sugars, give meter based on insurance specification 1 each 1    cycloSPORINE (RESTASIS) 0.05 % ophthalmic emulsion Place 0.4 mLs (1 drop total) into both eyes 2 (two) times daily. 60 each 11    DULoxetine (CYMBALTA) 30 MG capsule Take one capsule by mouth twice daily 60 capsule 0    DULoxetine (CYMBALTA) 60 MG capsule Take 1 capsule (60 mg total) by mouth 2 (two) times daily. 60 capsule 11    DULoxetine (CYMBALTA) 60 MG capsule Take 60 mg by mouth 2 (two) times daily.      ergocalciferol (ERGOCALCIFEROL) 50,000 unit Cap Take 1 capsule (50,000 Units total) by mouth every 14 (fourteen) days. 4 capsule 5    fish oil-omega-3 fatty acids 300-1,000 mg capsule Take 1 capsule by mouth once daily.      fluticasone propionate (FLONASE) 50 mcg/actuation nasal spray 2 sprays (100 mcg total) by Each Nare route once daily. (Patient taking differently: 2 sprays by Each Nostril route daily as needed. ) 16 g 11    furosemide (LASIX) 20 MG tablet Take 1 tablet (20 mg total) by mouth once daily. 30 tablet 11    gabapentin (NEURONTIN) 300 MG capsule Take 2 capsules (600 mg total) by mouth 3 (three) times daily. 90 capsule 3    hydrALAZINE (APRESOLINE) 10 MG tablet Take 1 tablet (10 mg total) by mouth every 12 (twelve) hours. 60 tablet 11    insulin aspart U-100 (NOVOLOG FLEXPEN U-100 INSULIN) 100 unit/mL (3 mL) InPn pen Inject before meals three times a day up to 30 units daily (Patient taking differently: Inject into the skin 3 (three) times daily with " "meals. Inject before meals three times a day up to 30 units daily) 15 mL 3    lancets 28 gauge Misc use as directed 3 times daily 100 each 11    liraglutide 0.6 mg/0.1 mL, 18 mg/3 mL, subq PNIJ (VICTOZA 2-MILENA) 0.6 mg/0.1 mL (18 mg/3 mL) PnIj Use 1.8mg under the skin daily (Patient taking differently: Inject 1.8 mg into the skin once daily. Use 1.8mg under the skin daily) 9 mL 3    lurasidone (LATUDA) 120 mg Tab Take 1 tablet by mouth at 5 pm with food (Patient taking differently: Take 120 mg by mouth nightly. ) 30 tablet 0    lurasidone 120 mg Tab Take one tablet by mouth daily at 5:00pm with food 30 tablet 1    magnesium oxide (MAG-OX) 400 mg (241.3 mg magnesium) tablet Take 1 tablet (400 mg total) by mouth once daily. 30 tablet 3    metFORMIN (GLUCOPHAGE) 1000 MG tablet TAKE 1 TABLET (1,000 MG TOTAL) BY MOUTH 2 (TWO) TIMES DAILY. 180 tablet 11    metoprolol succinate (TOPROL-XL) 100 MG 24 hr tablet Take 2 tablets (200 mg total) by mouth every evening. 180 tablet 3    mirtazapine (REMERON) 45 MG tablet take one tablet by mouth at bedtime 30 tablet 0    mometasone (ASMANEX TWISTHALER) 220 mcg (120 doses) AePB Inhale 2 puffs into the lungs 2 (two) times daily. Controller 1 each 11    MULTIVIT,CALC,MINS/IRON/FOLIC (DAILY MULTIPLE FOR WOMEN ORAL) Take 1 tablet by mouth once daily.      pen needle, diabetic (BD ULTRA-FINE MINI PEN NEEDLE) 31 gauge x 3/16" Ndle Use 5 a day 200 each 3    spironolactone (ALDACTONE) 25 MG tablet Take 1 tablet (25 mg total) by mouth once daily. 30 tablet 6    topiramate (TOPAMAX) 50 MG tablet Take 1 tablet (50 mg total) by mouth 2 (two) times daily. 60 tablet 11    TOUJEO SOLOSTAR U-300 INSULIN 300 unit/mL (1.5 mL) InPn pen Inject 80 Units into the skin every evening. 9 mL 0    triazolam (HALCION) 0.25 MG Tab Take 0.125 mg by mouth nightly as needed.      triazolam (HALCION) 0.25 MG Tab take one tablet by mouth at bedtime 14 tablet 0    valACYclovir (VALTREX) 1000 MG tablet " "Take 1 tablet (1,000 mg total) by mouth once daily. 30 tablet 11    valsartan (DIOVAN) 320 MG tablet Take 1 tablet (320 mg total) by mouth once daily. 90 tablet 3    LINZESS 145 mcg Cap capsule Take 145 mcg by mouth as needed.   3     No facility-administered medications prior to visit.         Medications Discontinued During This Encounter   Medication Reason    LINZESS 145 mcg Cap capsule Reorder        Medications Ordered This Encounter   Medications    linaCLOtide (LINZESS) 145 mcg Cap capsule     Sig: Take 1 capsule (145 mcg total) by mouth daily as needed (for constipation).     Dispense:  30 capsule     Refill:  11       FOLLOW-UP  Follow up in about 6 months (around 8/18/2020).     REVIEW OF SYSTEMS  GASTROINTESTINAL: No BRBPR or melena reported.   ENDOCRINE: No polyuria or polydipsia reported. No symptomatic or otherwise significant hypoglycemia or hyperglycemia reported.   CARDIOVASCULAR: No chest pain reported.  PULMONARY: No trouble breathing reported.     PHYSICAL EXAM  Vitals:    02/18/20 1049   BP: 118/76   BP Location: Left arm   Patient Position: Sitting   BP Method: Large (Manual)   Pulse: 93   Temp: 96.8 °F (36 °C)   TempSrc: Tympanic   SpO2: 95%   Weight: 89.9 kg (198 lb 3.1 oz)   Height: 4' 8" (1.422 m)     CONSTITUTIONAL: Vital signs noted. No apparent distress. Does not appear acutely ill or septic. Appears adequately hydrated.  ENT: Oropharynx moist.  PULM: Breathing unlabored.  CV: Heart regular.  DERM: Skin normothermic.  PSYCHIATRIC: Alert and conversant. Grossly oriented. Mood is grossly neutral. Affect appropriate. Judgment and insight not grossly compromised.     Documentation entered by me for this encounter may have been done in part using speech-recognition technology. Although I have made an effort to ensure accuracy, "sound like" errors may exist and should be interpreted in context. -DOMINIK Beach MD.   "

## 2020-02-18 NOTE — ASSESSMENT & PLAN NOTE
Wt Readings from Last 6 Encounters:   02/18/20 89.9 kg (198 lb 3.1 oz)   02/04/20 87.7 kg (193 lb 5.5 oz)   01/17/20 87.7 kg (193 lb 5.5 oz)   01/17/20 89.8 kg (198 lb)   01/10/20 89.9 kg (198 lb 3.1 oz)   01/05/20 93.9 kg (207 lb 0.2 oz)     BMI Readings from Last 2 Encounters:   02/18/20 44.43 kg/m²   02/04/20 43.35 kg/m²     Worse. Therapeutic lifestyle changes encouraged.

## 2020-02-18 NOTE — TELEPHONE ENCOUNTER
Pt would like to see about getting a dexcom   To come sign paperwork      ----- Message from Arley Miles sent at 2/18/2020  1:34 PM CST -----  Contact: Pt  Please give pt a call at .[phone] (home) regarding the paperwork for the DEXCOM.

## 2020-02-19 NOTE — PROGRESS NOTES
"Yolanda Tate.    I'm happy to report that your diabetes is MUCH IMPROVED based on your A1c of 7.1%. Keep up the good work!    I'm also happy to report that your other test results are fine and do not require a change in treatment.    Want to learn more about your test results and what they mean? It's as simple as 1, 2, 3.     (1) Log in to your [facility] account at [email or web address]     (2) From the "View test results" tab, click on the test you want to know more about.     (3) Click on the "About This Test" link.    If you have any additional questions about your test results, be sure to write them down and we can discuss them face-to-face at your next appointment.  If you have any urgent questions in the meantime, please send me a message using [facility], or you can call my office at [phone].    Thanks for letting me care for you, thanks for trusting us with your healthcare needs, and thanks for using [facility].    Sincerely,    DOMINIK Beach MD    P.S. - Please tell me how I'm doing and review me at [email or web address]."

## 2020-02-28 ENCOUNTER — PATIENT OUTREACH (OUTPATIENT)
Dept: ADMINISTRATIVE | Facility: OTHER | Age: 50
End: 2020-02-28

## 2020-03-02 RX ORDER — CYCLOSPORINE 0.5 MG/ML
1 EMULSION OPHTHALMIC 2 TIMES DAILY
Qty: 60 EACH | Refills: 11 | Status: SHIPPED | OUTPATIENT
Start: 2020-03-02 | End: 2020-05-21

## 2020-03-03 ENCOUNTER — TELEPHONE (OUTPATIENT)
Dept: ENDOCRINOLOGY | Facility: CLINIC | Age: 50
End: 2020-03-03

## 2020-03-03 ENCOUNTER — TELEPHONE (OUTPATIENT)
Dept: INTERNAL MEDICINE | Facility: CLINIC | Age: 50
End: 2020-03-03

## 2020-03-03 NOTE — TELEPHONE ENCOUNTER
Called and left a voicemail for patient to call the appointment desk to reschedule her podiatry and endocrine appointments due to me not having schedule override access for those departments.

## 2020-03-03 NOTE — TELEPHONE ENCOUNTER
Returned call mailbox is full unable to leave message      ----- Message from Neeta Sanders sent at 3/3/2020 12:55 PM CST -----  Contact: [phone]  Would like to consult with the nurse, patient would like to  Reschedule her Appt, and would like to speak with concerning getting another one, Please call back at  [phone], thanks sj

## 2020-03-03 NOTE — TELEPHONE ENCOUNTER
----- Message from Prema Mata sent at 3/3/2020 10:17 AM CST -----  Contact: Self  Patient requesting a call back to have her podiatry and endocrine appts rescheduled. She insisted to have Dr. Beach nurse be the one to reschedule. She would like to have them on the same day. Please call pt back at [phone]

## 2020-03-05 DIAGNOSIS — M79.7 FIBROMYALGIA: Chronic | ICD-10-CM

## 2020-03-06 ENCOUNTER — TELEPHONE (OUTPATIENT)
Dept: ENDOCRINOLOGY | Facility: CLINIC | Age: 50
End: 2020-03-06

## 2020-03-06 RX ORDER — GABAPENTIN 300 MG/1
600 CAPSULE ORAL 3 TIMES DAILY
Qty: 90 CAPSULE | Refills: 3 | Status: SHIPPED | OUTPATIENT
Start: 2020-03-06 | End: 2020-04-20

## 2020-03-06 NOTE — TELEPHONE ENCOUNTER
----- Message from Lencho Pollack sent at 3/6/2020 12:15 PM CST -----  Contact: self  Type:  Patient Returning Call    Who Called:pt  Who Left Message for Patient:yousif  Does the patient know what this is regarding?:no  Would the patient rather a call back or a response via [facility]? Call back  Best Call Back Number: [phone]  Additional Information: none    Thanks,  Lencho Pollack

## 2020-03-06 NOTE — TELEPHONE ENCOUNTER
----- Message from Tomasa Nation sent at 3/6/2020  7:26 AM CST -----  Contact: Patient   Yolanda would like a call back at [phone], Regards to getting her appointment reschedule.    Thanks  Td

## 2020-03-09 ENCOUNTER — PATIENT MESSAGE (OUTPATIENT)
Dept: ADMINISTRATIVE | Facility: OTHER | Age: 50
End: 2020-03-09

## 2020-03-14 NOTE — PROGRESS NOTES
"Digital Medicine: Clinician Follow-Up    The history is provided by the patient.     Follow Up  Follow-up reason(s): reading review and routine education      Readings are trending up Yolanda can hardly talk today. Her voice is low and the pain is evident. She endorses pain in her stomach and all joints. She endorses shortness of breath, has improved slightly with diuretic regimen, but continues to be present. She also states the pain takes her breath away at times.     States she is eating better, but cost and preparation keep her from achieving this goal daily.   Looking forward to upcoming gallbladder removal. She states she lives in pain from this and "whatever is going on in her joints."    Patient states she looks forward to our calls and having the digital blood pressure cuff is a constant reminder to do what she can to control her disease.       INTERVENTION(S)  recommended diet modifications and encouragement/support    PLAN  Health  follow up and continue monitoring    No changes made today to patient's hypertensive medication regimen.   Encouraged her to continue work on her lifestyle contributions, mainly diet for now, and increase in physical activity once her pain reduces.   BP with digital cuff is elevated, but controlled at all recent MD visits. Advised patient to charge her digital cuff.       There are no preventive care reminders to display for this patient.    Last 5 Patient Entered Readings                                      Current 30 Day Average: 148/94     Recent Readings 12/10/2019 12/8/2019 12/6/2019 12/4/2019 12/4/2019    SBP (mmHg) 155 151 164 159 134    DBP (mmHg) 97 100 106 100 89    Pulse 97 95 92 88 93             Hypertension Medications             amLODIPine (NORVASC) 10 MG tablet Take 10 mg by mouth once daily.    cloNIDine (CATAPRES) 0.1 MG tablet Take 1 tablet (0.1 mg total) by mouth every 6 (six) hours as needed (for SBP>150 or DBP>100 mmHg).    furosemide (LASIX) 20 MG " ICD Management for OR 3/17/20    Ms Travis has a [employer] ICD and is scheduled for right breast lumpectomy.  If her ICD is implanted on the left pectoral region, a donut magnet can be placed over the ICD generator during bipolar cautery use.  Normal ICD detection is restored once the magnet is removed.    If her ICD is implanted on the right, her ICD should be reprogrammed with detection off and she should be placed on the external Zoll pads and monitor for OR.    I will clarify her ICD Implant sight upon the patient's arrival on 3/17.    Roseann Ruiz CNP  Cardiology/Electrophysiology   tablet Take 1 tablet (20 mg total) by mouth once daily.    hydrALAZINE (APRESOLINE) 25 MG tablet Take 1 tablet (25 mg total) by mouth every 12 (twelve) hours.    metoprolol succinate (TOPROL-XL) 100 MG 24 hr tablet Take 2 tablets (200 mg total) by mouth every evening.    spironolactone (ALDACTONE) 25 MG tablet Take 1 tablet (25 mg total) by mouth once daily.    valsartan (DIOVAN) 320 MG tablet Take 1 tablet (320 mg total) by mouth once daily.                 Screenings

## 2020-03-16 DIAGNOSIS — E11.65 TYPE 2 DIABETES MELLITUS WITH HYPERGLYCEMIA, WITH LONG-TERM CURRENT USE OF INSULIN: Chronic | ICD-10-CM

## 2020-03-16 DIAGNOSIS — Z79.4 TYPE 2 DIABETES MELLITUS WITH HYPERGLYCEMIA, WITH LONG-TERM CURRENT USE OF INSULIN: Chronic | ICD-10-CM

## 2020-03-16 RX ORDER — INSULIN GLARGINE 300 U/ML
80 INJECTION, SOLUTION SUBCUTANEOUS NIGHTLY
Qty: 9 ML | Refills: 0 | Status: SHIPPED | OUTPATIENT
Start: 2020-03-16 | End: 2020-04-02 | Stop reason: SDUPTHER

## 2020-03-18 ENCOUNTER — PATIENT OUTREACH (OUTPATIENT)
Dept: OTHER | Facility: OTHER | Age: 50
End: 2020-03-18

## 2020-03-19 NOTE — PROGRESS NOTES
Digital Medicine: Health  Follow-Up    Patient called in to let me know she was doing well. Patient states she has lost some weight since we last talk. Patient is down to 193. Patient states cooking at home more has helped to facilitate the weight loss. Patient reports she has been walking a lot lately as well. Patient was recovering from surgery at our last encounter but states she is good to start taking readings again. Gave patient the number to the tech team in case she has any issues. Will f/u in 4 weeks or if she submits some readings.     The history is provided by the patient. No  was used.     Follow Up  Follow-up reason(s): reading review and routine education      Readings are missing.   patient reminder needed.  Routine Education Topics: eating patterns and physical activity        INTERVENTION(S)  reviewed monitoring technique and encouragement/support      There are no preventive care reminders to display for this patient.    Last 5 Patient Entered Readings                                      Current 30 Day Average:      Recent Readings 12/18/2019 12/10/2019 12/8/2019 12/6/2019 12/4/2019    SBP (mmHg) 155 155 151 164 159    DBP (mmHg) 97 97 100 106 100    Pulse 97 97 95 92 88                      Diet Screening       The patient states that she typically eats a non-home cooked meal (ex: dining out, take out, frozen meals) 1-2 times per week.      Barriers to a Healthy Diet: no barriers to healthy eating    Patient reports that she is cooking more at home and has cut back on fast food and fried foods.  Patient just states that overall she is making better choices. Patient allows herself one cheat meal/day per week. Encouraged patient to keep up the good work.     Physical Activity Screening   When asked if exercising, patient responded: yes    Patient participates in the following activities: yard/housework and walking    She identified the following barriers to physical  activity: no barriers to being active    Patient states that she gets most of her activity from walking and chores around the house. Encouraged patient to keep up the good work and to look for more ways to get out and be active.     Medication Adherence Screening   She did not miss a dose this month.    Patient identified the following reasons for non-compliance: None    Patient reports no s/s or issues taking BP medication as prescribed.       SDOH

## 2020-03-25 ENCOUNTER — OFFICE VISIT (OUTPATIENT)
Dept: INTERNAL MEDICINE | Facility: CLINIC | Age: 50
End: 2020-03-25
Payer: MEDICAID

## 2020-03-25 ENCOUNTER — NURSE TRIAGE (OUTPATIENT)
Dept: ADMINISTRATIVE | Facility: CLINIC | Age: 50
End: 2020-03-25

## 2020-03-25 ENCOUNTER — TELEPHONE (OUTPATIENT)
Dept: ADMINISTRATIVE | Facility: CLINIC | Age: 50
End: 2020-03-25

## 2020-03-25 DIAGNOSIS — Z79.4 TYPE 2 DIABETES MELLITUS WITH HYPERGLYCEMIA, WITH LONG-TERM CURRENT USE OF INSULIN: Chronic | ICD-10-CM

## 2020-03-25 DIAGNOSIS — A08.4 VIRAL GASTROENTERITIS: Primary | ICD-10-CM

## 2020-03-25 DIAGNOSIS — E11.65 TYPE 2 DIABETES MELLITUS WITH HYPERGLYCEMIA, WITH LONG-TERM CURRENT USE OF INSULIN: Chronic | ICD-10-CM

## 2020-03-25 DIAGNOSIS — J45.30 MILD PERSISTENT ASTHMA WITHOUT COMPLICATION: Chronic | ICD-10-CM

## 2020-03-25 DIAGNOSIS — I10 ESSENTIAL HYPERTENSION: Chronic | ICD-10-CM

## 2020-03-25 PROCEDURE — 99213 PR OFFICE/OUTPT VISIT, EST, LEVL III, 20-29 MIN: ICD-10-PCS | Mod: 95,,, | Performed by: PEDIATRICS

## 2020-03-25 PROCEDURE — 99213 OFFICE O/P EST LOW 20 MIN: CPT | Mod: 95,,, | Performed by: PEDIATRICS

## 2020-03-25 RX ORDER — ONDANSETRON 8 MG/1
8 TABLET, ORALLY DISINTEGRATING ORAL EVERY 8 HOURS PRN
Qty: 6 TABLET | Refills: 0 | Status: SHIPPED | OUTPATIENT
Start: 2020-03-25 | End: 2020-03-28

## 2020-03-25 NOTE — PROGRESS NOTES
TELEMEDICINE VIRTUAL VISIT    Subjective:       Patient ID: Yolanda Betts is a 49 y.o. female.    Chief Complaint: No chief complaint on file.    2 days of cold symptoms of URI, scratchy throat, ear popping, nausea(last night- no emesis), diarrhea all day yesterday. Now resolvedNo fever, no cough, no resp distress. No rash, feels fatigued. UOP good. Checking BS TID all . Asthma quiet. No ill contacts no travel.    Review of Systems   Constitutional: Positive for fatigue. Negative for fever and unexpected weight change.   HENT: Positive for congestion, ear pain (ears popping), postnasal drip and sore throat (scratchy throat). Negative for rhinorrhea, sinus pressure and sinus pain.    Eyes: Negative for discharge and redness.   Respiratory: Negative for cough, chest tightness, shortness of breath and wheezing.    Cardiovascular: Negative for chest pain, palpitations and leg swelling.   Gastrointestinal: Positive for diarrhea and nausea. Negative for abdominal pain, blood in stool, constipation and vomiting.   Endocrine: Negative for polydipsia, polyphagia and polyuria.   Genitourinary: Negative for decreased urine volume, difficulty urinating, dysuria and menstrual problem.   Musculoskeletal: Negative for arthralgias and joint swelling.   Skin: Negative for rash and wound.   Neurological: Positive for headaches (frontal). Negative for syncope.   Psychiatric/Behavioral: Negative for behavioral problems and sleep disturbance.       Objective:      CONSTITUTIONAL: No apparent distress. Does not appear acutely ill or septic- tired, lying in bed but nontoxic. Speech without difficulty. Appears adequately hydrated.  PULM: Breathing unlabored. No cough or hoarseness  PSYCHIATRIC: Alert and conversant and grossly oriented. Mood is grossly neutral. Affect appropriate. Judgment and insight grossly intact.      Assessment:       1. Viral gastroenteritis    2. Type 2 diabetes mellitus with hyperglycemia, with  "long-term current use of insulin    3. Essential hypertension    4. Mild persistent asthma without complication        Plan:       Viral gastroenteritis  -     ondansetron (ZOFRAN-ODT) 8 MG TbDL; Take 1 tablet (8 mg total) by mouth every 8 (eight) hours as needed.  Dispense: 6 tablet; Refill: 0    Type 2 diabetes mellitus with hyperglycemia, with long-term current use of insulin    Essential hypertension    Mild persistent asthma without complication       With her short presentation, scratchy throat and nausea and diarrhea, I suspect she has enterovirus/coxsackie viral GE. Hydration, tylenol, chloroseptic spray, salt water gargles, coracidin HPB if URI relief needed. S/S of worsening illness reviewed with patient. Work excuse to return pending covid 19 restrictions and 24 hours after GI symptoms resolve. I have asked her to check in by portal message to me or Dr Beach tomorrow.  Documentation entered by me for this encounter may have been done in part using speech-recognition technology. Although I have made an effort to ensure accuracy, "sound like" errors may exist and should be interpreted in context. -ANDRIY Manrique MD    Visit Details: This visit was a telemedicine virtual visit with synchronous audio and video. Yolanda reported that her location at the time of this visit was in the [facility]. Yolanda had the choice to come into office to receive these medical services. Yolanda chose and consented to receive these medical services by telemedicine.   "

## 2020-03-26 NOTE — TELEPHONE ENCOUNTER
Yolanda Tate.    I received your message. I'm sorry to hear that you are feeling bad.     This is a very scary time to be sick, I know. A lot of my patients have expressed their fear about what is going to happen to them or their loved ones.    Rest assured, [facility] has well-trained staff to meet your needs. We are following the recommendations of the CDC and the [facility] of Health, and we're recommending that our patients do the same.    It is important to keep in mind:    (1) Many if not most of upper respiratory infections in the community right now are caused by other viruses and NOT caused by COVID-19.    (2) Most upper respiratory infections will get better on their own without any medical treatment -- even sinus infections and bronchitis.     (3) The great majority of people infected with COVID-19 are only mildly to moderately ill and can recover at home and do not need any medical treatment.    (4) Currently, there are NO DRUGS approved or proven to treat COVID-19. The main treatment for COVID-19 used in the hospital is oxygen. Steroids don't help with COVID-19. Antibiotics don't do anything for COVID-19. Antiviral medicines don't seem to help. (There are some investigational antiviral drugs that are being studied, but they are not approved by the FDA, they are not readily available, and they are reserved for critically ill hospitalized patients.)    (5) If you are sick and you go to the doctor's office or the emergency department, then a couple of things can happen.       (A) If you have COVID-19, you can spread the disease by giving it to other people you are exposed to; or       (B) If you don't have COVID-19, you can catch it from the other people you are exposed to.    Because of this, we recommend that people don't go to the emergency department for treatment of COVID-19 unless they are experiencing symptoms that suggest a real emergency (for example, bad chest pain, difficulty breathing,  "altered mental status, decreased urination, etc.).    We are also recommending that people don't go to doctor's offices unless they need medical treatment that cannot wait or cannot be done over a telemedicine virtual visit. If you are interested in a telemedicine virtual visit, send a Lex[employer] message to my staff and request this option. IMPORTANT: Be sure to prepare for your virtual visit by following the instructions found at the following link = [email or web address] (Link is case-sensitive.)    If I'm unavailable, you can use [facility] ([email or web address]).    You can get more information about COVID-19 prevention at treatment at [email or web address].    Another great resource is the [facility] "Coronavirus Self-," available at the following link:  https://www.cdc.gov/coronavirus/2019-ncov/symptoms-testing/index.html#    If you are sick, you should follow the CDC recommendations for protecting yourself and your family, which you can find at the following link:  https://www.cdc.gov/coronavirus/2019-ncov/hcp/guidance-prevent-spread.html#precautions    I've summarized recommendations from the CDC (listed below). Please read and follow these recommendations - for your own health and the health of others.    Thanks for letting me care for you, thanks for trusting us with your healthcare needs, and thanks for using [facility].    I hope you feel better soon. Take care and be well.    Sincerely,    CHARLA Beach MD     --------------------------------------------------------------------------------      If you are sick with COVID-19 or think you might have it, follow the steps below to help protect other people in your home and community.     - STAY HOME: People who are mildly ill with COVID-19 are able to recover at home. Do not leave, except to get needed medical care. Do not visit public areas.     - CALL AHEAD: If you have a medical appointment, call your doctor's office or emergency " "department, and tell them you have or may have COVID-19. This will help the office protect themselves and other patients.     - IF YOU ARE SICK: You should wear a facemask when you are around other people and before you enter a healthcare provider's office.     - IF YOU ARE CARING FOR OTHERS: If the person who is sick is not able to wear a facemask (for example, because it causes trouble breathing), then people who live in the home should stay in a different room. When caregivers enter the room of the sick person, they should wear a facemask. Visitors, other than caregivers, are not recommended.     - COVER: Cover your mouth and nose with a tissue when you cough or sneeze.     - DISPOSE: Throw used tissues in a lined trash can.     - WASH HANDS: Immediately wash your hands with soap and water for at least 20 seconds. If soap and water are not available, use an alcohol-based hand  with at least 60% alcohol, covering all surfaces of your hands and rubbing them together until they feel dry. This is especially important after blowing your nose, coughing, or sneezing; going to the bathroom; and before eating or preparing food.     - AVOID TOUCHING: Avoid touching your eyes, nose, and mouth with unwashed hands.      - DO NOT SHARE: Do not share dishes, drinking glasses, cups, eating utensils, towels, or bedding with other people in your home. After using these items, wash them thoroughly with soap and water or put in the .     - CLEAN AND DISINFECT: Frequently clean high-touch surfaces in your "sick room" (typically your bedroom) and bathroom. High-touch surfaces include phones, remote controls, counters, tabletops, doorknobs, bathroom fixtures, toilets, keyboards, tablets, and bedside tables. Let someone else clean and disinfect surfaces in common areas, but not your sick room and bathroom.   - - If a caregiver or other person needs to clean and disinfect a sick person's sick room or bathroom, they " should do so on an as-needed basis, and they should wear a mask and wait as long as possible after the sick person has used the bathroom.   - - Clean and disinfect areas that may have sputum, blood, stool, or body fluids on them. Clean the area or item with soap and water or another detergent if it is dirty. Then, use a household disinfectant. Be sure to follow the instructions on the label to ensure safe and effective use of the product. Many products recommend keeping the surface wet for several minutes to ensure germs are killed. Many also recommend precautions such as wearing gloves and making sure you have good ventilation during use of the product.     - MONITOR YOUR SYMPTOMS: Seek medical care right away if your illness is becoming more severe (for example, if you have difficulty breathing). Otherwise  Call your doctor before going in: Before going to the doctor's office or emergency room, call ahead and tell them your symptoms. They will tell you what to do.     - WEAR A FACEMASK: If possible, put on a facemask before you enter the building. If you can't put on a facemask, try to keep a safe distance from other people (at least 6 feet away). This will help protect the people in the office or waiting room.   - If you have a medical emergency and need to call 911, notify the  that you have or think you might have, COVID-19. If possible, put on a facemask before medical help arrives.    PEOPLE WITH COVID-19 WHO HAVE STAYED HOME (HOME ISOLATED) CAN STOP HOME ISOLATION UNDER THE FOLLOWING CONDITIONS:  If you will not have a test to determine if you are still contagious, you can leave home after these three things have happened:   - (1) You have had no fever for at least 72 hours (that is three full days of no fever without the use medicine that reduces fevers)   - - AND   - (2) other symptoms have improved (for example, when your cough or shortness of breath have improved)   - - AND   - (3) at least 7  days have passed since your symptoms first appeared    If you will be tested to determine if you are still contagious, you can leave home after these three things have happened:   - (1) You no longer have a fever (without the use medicine that reduces fevers)    - - AND   - (2) other symptoms have improved (for example, when your cough or shortness of breath have improved)   - - AND   - (3) you received two negative tests in a row, 24 hours apart. Your doctor will follow CDC guidelines.

## 2020-03-26 NOTE — TELEPHONE ENCOUNTER
"Patient saw the doctor via "[facility]", called into at 2pm for sinus complaints.  Now having sore, received zofran after that call.  Now having sore throat, scratchy throat, ear popping.      Reason for Disposition   Lots of coughing   [1] Using nasal washes and pain medicine > 24 hours AND [2] sinus pain (around cheekbone or eye) persists    Additional Information   Negative: Severe difficulty breathing (e.g., struggling for each breath, speaks in single words)   Negative: Sounds like a life-threatening emergency to the triager   Negative: [1] Sinus infection AND [2] taking an antibiotic AND [3] symptoms continue   Negative: [1] Difficulty breathing AND [2] not from stuffy nose (e.g., not relieved by cleaning out the nose)   Negative: [1] SEVERE headache AND [2] fever   Negative: [1] Redness or swelling on the cheek, forehead or around the eye AND [2] fever   Negative: Fever > 104 F (40 C)   Negative: Patient sounds very sick or weak to the triager   Negative: [1] SEVERE pain AND [2] not improved 2 hours after pain medicine   Negative: [1] Redness or swelling on the cheek, forehead or around the eye AND [2] no fever   Negative: [1] Fever > 101 F (38.3 C) AND [2] age > 60   Negative: [1] Fever > 100.0 F (37.8 C) AND [2] bedridden (e.g., nursing home patient, CVA, chronic illness, recovering from surgery)   Negative: [1] Fever > 100.0 F (37.8 C) AND [2] diabetes mellitus or weak immune system (e.g., HIV positive, cancer chemo, splenectomy, chronic steroids)   Negative: Fever present > 3 days (72 hours)   Negative: [1] Fever returns after gone for over 24 hours AND [2] symptoms worse or not improved   Negative: [1] Sinus pain (not just congestion) AND [2] fever   Negative: Earache   Negative: [1] Sinus congestion (pressure, fullness) AND [2] present > 10 days   Negative: [1] Nasal discharge AND [2] present > 10 days    Protocols used: SINUS PAIN OR CONGESTION-A-AH      "

## 2020-03-26 NOTE — TELEPHONE ENCOUNTER
Reason for Disposition   Unable to complete triage due to phone connection issues    Additional Information   Negative: Caller has cancelled the call before the first contact   Negative: Patient already left for the hospital/clinic.   Negative: Pager number given.  Answering service notified.   Negative: Cell phone out of range.  Phone number verified.   Negative: Second attempt to contact family AND no contact made.  Phone number verified.   Negative: Wrong number reached.  Phone number verified.   Negative: Message left with person in household.   Negative: Message left on unidentified voice mail.  Phone number verified.   Negative: Message left on identified voice mail   Negative: No answer.  First attempt to contact caller.  Follow-up call scheduled within 15 minutes.   Negative: Busy signal.  First attempt to contact caller.  Follow-up call scheduled within 15 minutes.   Negative: Caller has already spoken with the PCP and has no further questions.   Negative: Caller has already spoken with another triager and has no further questions.   Negative: Caller has already spoken with another triager or PCP AND has further questions AND triager able to answer questions.    Protocols used: NO CONTACT OR DUPLICATE CONTACT CALL-A-

## 2020-03-26 NOTE — TELEPHONE ENCOUNTER
Patient requested anywhere care information for call tonight.  Information given to patient with coupon code.  Patient will also try to use my chart for md call.  Patient feels symptoms are sinus related and just needs reassurance from md for increased sinus concerns this pm.

## 2020-03-31 ENCOUNTER — TELEPHONE (OUTPATIENT)
Dept: INTERNAL MEDICINE | Facility: CLINIC | Age: 50
End: 2020-03-31

## 2020-03-31 ENCOUNTER — OFFICE VISIT (OUTPATIENT)
Dept: INTERNAL MEDICINE | Facility: CLINIC | Age: 50
End: 2020-03-31
Payer: MEDICAID

## 2020-03-31 DIAGNOSIS — R19.7 DIARRHEA, UNSPECIFIED TYPE: Primary | ICD-10-CM

## 2020-03-31 PROCEDURE — 99213 PR OFFICE/OUTPT VISIT, EST, LEVL III, 20-29 MIN: ICD-10-PCS | Mod: 95,,, | Performed by: FAMILY MEDICINE

## 2020-03-31 PROCEDURE — 99213 OFFICE O/P EST LOW 20 MIN: CPT | Mod: 95,,, | Performed by: FAMILY MEDICINE

## 2020-03-31 RX ORDER — LOPERAMIDE HYDROCHLORIDE 2 MG/1
2 CAPSULE ORAL 4 TIMES DAILY PRN
Qty: 30 CAPSULE | Refills: 1 | Status: SHIPPED | OUTPATIENT
Start: 2020-03-31 | End: 2021-03-31

## 2020-03-31 NOTE — TELEPHONE ENCOUNTER
Spoke with patient and scheduled her a video visit with Dr. Beach for 3:20 pm today to discuss the issues she is having with diarrhea, abdominal pain and fatigue.

## 2020-03-31 NOTE — TELEPHONE ENCOUNTER
----- Message from Michaela Jimenez sent at 3/31/2020  2:29 PM CDT -----  Contact: pt  .Type:  Needs Medical Advice    Who Called:  pt  Symptoms (please be specific):  diarrhea  , stomach issues , drained/lack of energy   How long has patient had these symptoms:   Since 3/25   Pharmacy name and phone #:       [facility]  [address]  [place] [address]  Phone: [phone] Fax: [phone]      Would the patient rather a call back or a response via [facility]? Call  Back     Best Call Back Number:[phone] (home)     Additional Information:

## 2020-03-31 NOTE — PROGRESS NOTES
"TELEMEDICINE VIRTUAL VISIT  CHIEF COMPLAINT  Diarrhea    DIAGNOSES, HISTORY, ASSESSMENT, AND PLAN    1. Diarrhea, unspecified type    She reports 2 weeks history of non-bloody diarrhea, up to 3-4 BMs per day. She says she has the same symptoms in 2017 and was told the probably had IBS. She reports no fever or abdominal pain.    Problem List Items Addressed This Visit     None      Visit Diagnoses     Diarrhea, unspecified type    -  Primary          MEDICATION MANAGMENT    COMMENT: Duplication of med entries noted in list, but she is not taking duplicated meds.  Outpatient Medications Prior to Visit   Medication Sig Dispense Refill    aspirin (ECOTRIN) 81 MG EC tablet Take 81 mg by mouth once daily.      atorvastatin (LIPITOR) 20 MG tablet Take 1 tablet (20 mg total) by mouth every evening. 90 tablet 3    BD INSULIN SYRINGE ULTRA-FINE 1/2 mL 30 gauge x 1/2" Syrg   0    cycloSPORINE (RESTASIS) 0.05 % ophthalmic emulsion Place 0.4 mLs (1 drop total) into both eyes 2 (two) times daily. 60 each 11    ergocalciferol (ERGOCALCIFEROL) 50,000 unit Cap Take 1 capsule (50,000 Units total) by mouth every 14 (fourteen) days. 4 capsule 5    fish oil-omega-3 fatty acids 300-1,000 mg capsule Take 1 capsule by mouth once daily.      fluticasone propionate (FLONASE) 50 mcg/actuation nasal spray 2 sprays (100 mcg total) by Each Nare route once daily. (Patient taking differently: 2 sprays by Each Nostril route daily as needed. ) 16 g 11    furosemide (LASIX) 20 MG tablet Take 1 tablet (20 mg total) by mouth once daily. 30 tablet 11    hydrALAZINE (APRESOLINE) 10 MG tablet Take 1 tablet (10 mg total) by mouth every 12 (twelve) hours. 60 tablet 11    insulin aspart U-100 (NOVOLOG FLEXPEN U-100 INSULIN) 100 unit/mL (3 mL) InPn pen Inject before meals three times a day up to 30 units daily (Patient taking differently: Inject 20 Units into the skin 3 (three) times daily with meals. Inject before meals three times a day up to 30 " "units daily) 15 mL 3    linaCLOtide (LINZESS) 145 mcg Cap capsule Take 1 capsule (145 mcg total) by mouth daily as needed (for constipation). 30 capsule 11    liraglutide 0.6 mg/0.1 mL, 18 mg/3 mL, subq PNIJ (VICTOZA 2-MILENA) 0.6 mg/0.1 mL (18 mg/3 mL) PnIj Use 1.8mg under the skin daily (Patient taking differently: Inject 1.8 mg into the skin once daily. Use 1.8mg under the skin daily) 9 mL 3    magnesium oxide (MAG-OX) 400 mg (241.3 mg magnesium) tablet Take 1 tablet (400 mg total) by mouth once daily. 30 tablet 3    metFORMIN (GLUCOPHAGE) 1000 MG tablet TAKE 1 TABLET (1,000 MG TOTAL) BY MOUTH 2 (TWO) TIMES DAILY. 180 tablet 11    metoprolol succinate (TOPROL-XL) 100 MG 24 hr tablet Take 2 tablets (200 mg total) by mouth every evening. 180 tablet 3    MULTIVIT,CALC,MINS/IRON/FOLIC (DAILY MULTIPLE FOR WOMEN ORAL) Take 1 tablet by mouth once daily.      pen needle, diabetic (BD ULTRA-FINE MINI PEN NEEDLE) 31 gauge x 3/16" Ndle Use 5 a day 200 each 3    spironolactone (ALDACTONE) 25 MG tablet Take 1 tablet (25 mg total) by mouth once daily. 30 tablet 6    topiramate (TOPAMAX) 50 MG tablet Take 1 tablet (50 mg total) by mouth 2 (two) times daily. 60 tablet 11    triazolam (HALCION) 0.25 MG Tab Take 1 tablet by mouth at bedtime 30 tablet 2    valACYclovir (VALTREX) 1000 MG tablet Take 1 tablet (1,000 mg total) by mouth once daily. 30 tablet 11    valsartan (DIOVAN) 320 MG tablet Take 1 tablet (320 mg total) by mouth once daily. 90 tablet 3    ALPRAZolam (XANAX) 2 MG Tab Take 1 tablet by mouth 2 (two) times daily as needed.      ALPRAZolam (XANAX) 2 MG Tab Take 1 tablet by mouth twice daily 60 tablet 2    amLODIPine (NORVASC) 10 MG tablet Take 10 mg by mouth once daily.      amLODIPine (NORVASC) 10 MG tablet Take 1 tablet (10 mg total) by mouth once daily. 30 tablet 3    ARIPiprazole (ABILIFY) 10 MG Tab Take 10 mg by mouth every evening.      ARIPiprazole (ABILIFY) 10 MG Tab Take 1 tablet by mouth every " day 30 tablet 2    benztropine (COGENTIN) 1 MG tablet Take 1 mg by mouth once daily.      benztropine (COGENTIN) 1 MG tablet take one tablet by mouth at bedtime 30 tablet 0    benztropine (COGENTIN) 1 MG tablet Take 1 tablet by mouth at bedtime 30 tablet 2    DULoxetine (CYMBALTA) 60 MG capsule Take 1 capsule (60 mg total) by mouth 2 (two) times daily. 60 capsule 11    DULoxetine (CYMBALTA) 60 MG capsule Take 60 mg by mouth 2 (two) times daily.      DULoxetine (CYMBALTA) 60 MG capsule Take 1 capsule by mouth twice daily 60 capsule 2    gabapentin (NEURONTIN) 300 MG capsule Take 2 capsules (600 mg total) by mouth 3 (three) times daily. 90 capsule 3    lurasidone (LATUDA) 120 mg Tab Take 1 tablet by mouth at 5 pm with food (Patient taking differently: Take 120 mg by mouth nightly. ) 30 tablet 0    lurasidone (LATUDA) 120 mg Tab Take 1 tablet by mouth at 5 p.m. with food 30 tablet 2    lurasidone 120 mg Tab Take one tablet by mouth daily at 5:00pm with food 30 tablet 1    mirtazapine (REMERON) 45 MG tablet Take 1 tablet by mouth at bedtime 30 tablet 2    mometasone (ASMANEX TWISTHALER) 220 mcg (120 doses) AePB Inhale 2 puffs into the lungs 2 (two) times daily. Controller 1 each 11    TOUJEO SOLOSTAR U-300 INSULIN 300 unit/mL (1.5 mL) InPn pen Inject 80 Units into the skin every evening. 9 mL 0    triazolam (HALCION) 0.25 MG Tab Take 0.125 mg by mouth nightly as needed.      blood sugar diagnostic Strp Check blood glucose 4 times daily as directed and as needed (dispense insurance preferred brand or patient choice) 200 each 5    blood-glucose meter Misc Use to check blood sugars, give meter based on insurance specification 1 each 1    lancets 28 gauge Misc use as directed 3 times daily 100 each 11    meclizine (ANTIVERT) 25 mg tablet Take 1 tablet by mouth every day as needed until directed to stop. 5 tablet 0    DULoxetine (CYMBALTA) 30 MG capsule Take one capsule by mouth twice daily 60 capsule 0  "    No facility-administered medications prior to visit.         There are no discontinued medications.     Medications Ordered This Encounter   Medications    loperamide (IMODIUM) 2 mg capsule     Sig: Take 1 capsule (2 mg total) by mouth 4 (four) times daily as needed for Diarrhea.     Dispense:  30 capsule     Refill:  1        FOLLOW-UP  Follow up if symptoms worsen or fail to improve.     REVIEW OF SYSTEMS  GASTROINTESTINAL: No BRBPR or melena reported.  CONSTITUTIONAL: No fever or chills reported.     PHYSICAL EXAM  CONSTITUTIONAL: No apparent distress. Does not appear acutely ill or septic. Appears adequately hydrated.  PULM: Breathing unlabored.  PSYCHIATRIC: Alert and conversant and grossly oriented. Mood is grossly neutral. Affect appropriate. Judgment and insight grossly intact.    Documentation entered by me for this encounter may have been done in part using speech-recognition technology. Although I have made an effort to ensure accuracy, "sound like" errors may exist and should be interpreted in context. -DOMINIK Beach MD.    Visit Details: This visit was a telemedicine virtual visit with synchronous audio and video. Yolanda reported that her location at the time of this visit was in the [facility]. Yolanda chose and consented to receive these medical services by telemedicine. TOTAL TIME evaluating and managing this patient for this encounter exceeded 20 minutes, the majority spent counseling and coordinating care for the listed diagnoses.  "

## 2020-04-02 ENCOUNTER — TELEPHONE (OUTPATIENT)
Dept: ENDOCRINOLOGY | Facility: CLINIC | Age: 50
End: 2020-04-02

## 2020-04-02 DIAGNOSIS — Z79.4 TYPE 2 DIABETES MELLITUS WITH HYPERGLYCEMIA, WITH LONG-TERM CURRENT USE OF INSULIN: Chronic | ICD-10-CM

## 2020-04-02 DIAGNOSIS — E11.65 TYPE 2 DIABETES MELLITUS WITH HYPERGLYCEMIA, WITH LONG-TERM CURRENT USE OF INSULIN: Chronic | ICD-10-CM

## 2020-04-02 RX ORDER — INSULIN GLARGINE 300 U/ML
80 INJECTION, SOLUTION SUBCUTANEOUS NIGHTLY
Qty: 12 ML | Refills: 3 | Status: SHIPPED | OUTPATIENT
Start: 2020-04-02 | End: 2020-07-09

## 2020-04-02 NOTE — TELEPHONE ENCOUNTER
----- Message from Sis Harvey sent at 4/2/2020  9:23 AM CDT -----  Contact: Pt   Pt called and stated that she only received one box of Toujeo medication and she will be out of it in a few days because she takes 80 units once a day. She can be reached at [phone].    Thanks,  TF

## 2020-04-03 ENCOUNTER — NURSE TRIAGE (OUTPATIENT)
Dept: ADMINISTRATIVE | Facility: CLINIC | Age: 50
End: 2020-04-03

## 2020-04-03 ENCOUNTER — TELEPHONE (OUTPATIENT)
Dept: INTERNAL MEDICINE | Facility: CLINIC | Age: 50
End: 2020-04-03

## 2020-04-03 ENCOUNTER — HOSPITAL ENCOUNTER (EMERGENCY)
Facility: HOSPITAL | Age: 50
Discharge: HOME | End: 2020-04-03
Attending: EMERGENCY MEDICINE
Payer: MEDICAID

## 2020-04-03 VITALS
HEIGHT: 56 IN | RESPIRATION RATE: 24 BRPM | WEIGHT: 198 LBS | BODY MASS INDEX: 44.54 KG/M2 | TEMPERATURE: 99 F | SYSTOLIC BLOOD PRESSURE: 131 MMHG | OXYGEN SATURATION: 95 % | HEART RATE: 78 BPM | DIASTOLIC BLOOD PRESSURE: 87 MMHG

## 2020-04-03 DIAGNOSIS — R53.1 WEAKNESS: ICD-10-CM

## 2020-04-03 DIAGNOSIS — R19.7 DIARRHEA, UNSPECIFIED TYPE: Primary | ICD-10-CM

## 2020-04-03 LAB
ALBUMIN SERPL BCP-MCNC: 3.7 G/DL (ref 3.5–5.2)
ALP SERPL-CCNC: 96 U/L (ref 55–135)
ALT SERPL W/O P-5'-P-CCNC: 32 U/L (ref 10–44)
ANION GAP SERPL CALC-SCNC: 12 MMOL/L (ref 8–16)
AST SERPL-CCNC: 15 U/L (ref 10–40)
BASOPHILS # BLD AUTO: 0.06 K/UL (ref 0–0.2)
BASOPHILS NFR BLD: 0.7 % (ref 0–1.9)
BILIRUB SERPL-MCNC: 0.2 MG/DL (ref 0.1–1)
BUN SERPL-MCNC: 15 MG/DL (ref 6–20)
CALCIUM SERPL-MCNC: 9.7 MG/DL (ref 8.7–10.5)
CHLORIDE SERPL-SCNC: 100 MMOL/L (ref 95–110)
CO2 SERPL-SCNC: 25 MMOL/L (ref 23–29)
CREAT SERPL-MCNC: 0.7 MG/DL (ref 0.5–1.4)
DIFFERENTIAL METHOD: ABNORMAL
EOSINOPHIL # BLD AUTO: 0.2 K/UL (ref 0–0.5)
EOSINOPHIL NFR BLD: 2.4 % (ref 0–8)
ERYTHROCYTE [DISTWIDTH] IN BLOOD BY AUTOMATED COUNT: 15.8 % (ref 11.5–14.5)
EST. GFR  (AFRICAN AMERICAN): >60 ML/MIN/1.73 M^2
EST. GFR  (NON AFRICAN AMERICAN): >60 ML/MIN/1.73 M^2
GLUCOSE SERPL-MCNC: 229 MG/DL (ref 70–110)
HCT VFR BLD AUTO: 37.8 % (ref 37–48.5)
HGB BLD-MCNC: 11.4 G/DL (ref 12–16)
IMM GRANULOCYTES # BLD AUTO: 0.08 K/UL (ref 0–0.04)
IMM GRANULOCYTES NFR BLD AUTO: 1 % (ref 0–0.5)
LYMPHOCYTES # BLD AUTO: 3.4 K/UL (ref 1–4.8)
LYMPHOCYTES NFR BLD: 40.8 % (ref 18–48)
MCH RBC QN AUTO: 27.6 PG (ref 27–31)
MCHC RBC AUTO-ENTMCNC: 30.2 G/DL (ref 32–36)
MCV RBC AUTO: 92 FL (ref 82–98)
MONOCYTES # BLD AUTO: 0.4 K/UL (ref 0.3–1)
MONOCYTES NFR BLD: 4.7 % (ref 4–15)
NEUTROPHILS # BLD AUTO: 4.2 K/UL (ref 1.8–7.7)
NEUTROPHILS NFR BLD: 50.4 % (ref 38–73)
NRBC BLD-RTO: 0 /100 WBC
PLATELET # BLD AUTO: 309 K/UL (ref 150–350)
PMV BLD AUTO: 9.6 FL (ref 9.2–12.9)
POTASSIUM SERPL-SCNC: 4.8 MMOL/L (ref 3.5–5.1)
PROT SERPL-MCNC: 7.5 G/DL (ref 6–8.4)
RBC # BLD AUTO: 4.13 M/UL (ref 4–5.4)
SODIUM SERPL-SCNC: 137 MMOL/L (ref 136–145)
WBC # BLD AUTO: 8.33 K/UL (ref 3.9–12.7)

## 2020-04-03 PROCEDURE — 80053 COMPREHEN METABOLIC PANEL: CPT

## 2020-04-03 PROCEDURE — 85025 COMPLETE CBC W/AUTO DIFF WBC: CPT

## 2020-04-03 PROCEDURE — 99285 EMERGENCY DEPT VISIT HI MDM: CPT | Mod: 25

## 2020-04-03 RX ORDER — DIPHENOXYLATE HYDROCHLORIDE AND ATROPINE SULFATE 2.5; .025 MG/1; MG/1
1 TABLET ORAL 4 TIMES DAILY PRN
Qty: 20 TABLET | Refills: 0 | Status: SHIPPED | OUTPATIENT
Start: 2020-04-03 | End: 2020-04-13

## 2020-04-03 NOTE — TELEPHONE ENCOUNTER
----- Message from Ceci Lala sent at 4/3/2020 10:03 AM CDT -----  Contact: Pt  Pt is calling in regards to giving provider update after being released from hospital. Pt stated that Dr Beach wanted her to call after being discharged.          Pls call back at [phone]

## 2020-04-03 NOTE — TELEPHONE ENCOUNTER
Reason for Disposition   Patient sounds very sick or weak to the triager    Additional Information   Negative: SEVERE difficulty breathing (e.g., struggling for each breath, speak in single words, bluish lips)   Negative: Sounds like a life-threatening emergency to the triager   Negative: [1] Difficulty breathing occurs AND [2] within 14 days of COVID-19 EXPOSURE (Close Contact)    Protocols used: CORONAVIRUS (COVID-19) EXPOSURE-A-    Patient called to report worsening symptoms. She has worse body aches and worse sore throat. She has shortness of breath and has asthma. She denies fever. Advised her to get someone to bring her to the ED for evaluation and she verbalized understanding.     0650--she is in triage at ED.

## 2020-04-03 NOTE — TELEPHONE ENCOUNTER
----- Message from Manisha Roque sent at 4/3/2020  2:13 PM CDT -----  Contact: self/[phone]  Type:  Patient Returning Call    Who Called:Yolanda Betts  Who Left Message for Patient:nurse  Does the patient know what this is regarding?:yes  Would the patient rather a call back or a response via [facility]? Call back   Best Call Back Number:[phone]  Additional Information:

## 2020-04-03 NOTE — ED PROVIDER NOTES
SCRIBE #1 NOTE: I, Bernice Brown, am scribing for, and in the presence of, Christopher Yost MD. I have scribed the entire note.       History     Chief Complaint   Patient presents with    Generalized Body Aches     +sore throat, abd pain, diarrhea, and intermitten SOB.  x 1 week     Review of patient's allergies indicates:   Allergen Reactions    Codeine Itching    Neuromuscular blockers, steroidal Hives     some    Latex, natural rubber Rash    Morphine Rash     itching    Norco [hydrocodone-acetaminophen] Itching, Rash and Hallucinations    Seconal [secobarbital sodium] Rash     itching    Tylox [oxycodone-acetaminophen] Rash         History of Present Illness     HPI    4/3/2020, 6:49 AM  History obtained from the patient      History of Present Illness: Yolanda Betts is a 49 y.o. female patient with a PMHx of GERD, HTN, ALEN, asthma, obesity, and DM who presents to the Emergency Department for evaluation of a cough which onset gradually one week ago. Pt reports that she initially developed diarrhea 2 weeks ago, but her sx slowly progressed one week following into a sore throat, abdominal pain, dizziness, generalized weakness, congestion, cough, and intermittent SOB. Symptoms are constant and moderate in severity. No mitigating or exacerbating factors reported. Associated sxs include diarrhea, abdominal pain, congestion, intermittent SOB, dizziness, nausea, sore throat, and generalized weakness. Patient denies any fever/ chills, CP, palpations, hematochezia, numbness, HA, rash, wound, emesis, back/ neck pain, dysuria, hematuria, localized weakness, easily bruising, leg swelling, and all other sxs at this time. No prior tx reported. No further complaints or concerns at this time.     Arrival mode: Personal vehicle     PCP: CHARLA Beach MD        Past Medical History:  Past Medical History:   Diagnosis Date    Abnormal Pap smear of cervix     HPV genital warts    Anemia     Anxiety      Arthritis     Asthma 10/11/2016    Bipolar 1 disorder     Diabetes mellitus, type 2     Dyslipidemia associated with type 2 diabetes mellitus 2019    Genital warts     GERD (gastroesophageal reflux disease)     Herpes simplex virus (HSV) infection     Hypertension     Hypertension complicating diabetes 2019    Migraine with aura and without status migrainosus, not intractable 3/21/2016    Mild persistent asthma without complication 10/11/2016    Morbid obesity with body mass index (BMI) of 45.0 to 49.9 in adult 8/3/2017    Obstructive sleep apnea     ALEN (obstructive sleep apnea)     2L per N/C q HS    Schizoaffective disorder, bipolar type 2019    Seasonal allergic rhinitis due to pollen 2019    Type 2 diabetes mellitus with hyperglycemia, with long-term current use of insulin 2017    Type 2 diabetes mellitus with hyperglycemia, without long-term current use of insulin 2017       Past Surgical History:  Past Surgical History:   Procedure Laterality Date    abcess removed right back      BELT ABDOMINOPLASTY      BREAST SURGERY Bilateral     Reduction     SECTION      X 2    COLONOSCOPY      COLONOSCOPY N/A 2018    Procedure: colonoscopy for iron deficiency anemia;  Surgeon: Frankie Sanchez MD;  Location: [facility] ENDO;  Service: Endoscopy;  Laterality: N/A;    COLONOSCOPY N/A 2018    Procedure: COLONOSCOPY;  Surgeon: Frankie Sanchez MD;  Location: [facility];  Service: Endoscopy;  Laterality: N/A;    HYSTERECTOMY      w/ BSO; hypermenorrhea    MOUTH SURGERY      OOPHORECTOMY      hyst/bso, hypermenorrhea    ROBOT-ASSISTED CHOLECYSTECTOMY USING DA DARI XI N/A 1/3/2020    Procedure: XI ROBOTIC CHOLECYSTECTOMY;  Surgeon: Damir Driscoll MD;  Location: [facility] OR;  Service: General;  Laterality: N/A;    TUBAL LIGATION           Family History:  Family History   Problem Relation Age of Onset    Diabetes Mother      "Hyperlipidemia Mother     Hypertension Mother     Asthma Mother     COPD Mother     Glaucoma Mother     Thyroid disease Mother     Anesthesia problems Mother         "almost had a cardiac arrest" , blood clots    Hypertension Father     Hyperlipidemia Father     Cancer Father         Brain, lung, liver, kidney    Heart disease Maternal Grandmother     Hyperlipidemia Maternal Grandmother     Hypertension Maternal Grandmother     Cataracts Maternal Grandmother     Diabetes Maternal Grandmother     Heart disease Maternal Grandfather     Hyperlipidemia Maternal Grandfather     Hypertension Maternal Grandfather     Glaucoma Maternal Grandfather     Cancer Maternal Grandfather     Cataracts Maternal Grandfather     Macular degeneration Maternal Grandfather     Diabetes Maternal Grandfather     Heart disease Paternal Grandmother     Hyperlipidemia Paternal Grandmother     Hypertension Paternal Grandmother     Cataracts Paternal Grandmother     Heart disease Paternal Grandfather     Hyperlipidemia Paternal Grandfather     Hypertension Paternal Grandfather     Cataracts Paternal Grandfather     Breast cancer Maternal Cousin     Breast cancer Maternal Cousin     Breast cancer Maternal Cousin     Breast cancer Maternal Cousin        Social History:  Social History     Tobacco Use    Smoking status: Never Smoker    Smokeless tobacco: Never Used   Substance and Sexual Activity    Alcohol use: Yes     Alcohol/week: 0.0 standard drinks     Comment: socially  No alcohol 72h prior to sx    Drug use: No    Sexual activity: Not Currently     Partners: Male        Review of Systems     Review of Systems   Constitutional: Negative for chills and fever.        + Generalized weakness   HENT: Positive for congestion and sore throat.    Respiratory: Positive for cough and shortness of breath.    Cardiovascular: Negative for chest pain, palpitations and leg swelling.   Gastrointestinal: Positive for " "abdominal pain, diarrhea and nausea. Negative for blood in stool and vomiting.   Genitourinary: Negative for dysuria and hematuria.   Musculoskeletal: Negative for back pain and neck pain.   Skin: Negative for rash and wound.   Neurological: Positive for dizziness. Negative for weakness, numbness and headaches.   Hematological: Does not bruise/bleed easily.   All other systems reviewed and are negative.     Physical Exam     Initial Vitals [04/03/20 0651]   BP Pulse Resp Temp SpO2   136/80 85 20 98.9 °F (37.2 °C) 96 %      MAP       --          Physical Exam  Nursing Notes and Vital Signs Reviewed.   Constitutional: Patient is in no acute distress. Obese.  Head: Atraumatic. Normocephalic.  Eyes: PERRL. EOM intact. Conjunctivae are not pale. No scleral icterus.  ENT: Mucous membranes are moist. Oropharynx is clear and symmetric.    Neck: Supple. Full ROM. No lymphadenopathy.  Cardiovascular: Regular rate. Regular rhythm. No murmurs, rubs, or gallops. Distal pulses are 2+ and symmetric.  Pulmonary/Chest: No respiratory distress. Clear to auscultation bilaterally. No wheezing or rales.  Abdominal: Soft and non-distended.  There is no tenderness.  No rebound, guarding, or rigidity. Good bowel sounds.  Genitourinary: No CVA tenderness  Musculoskeletal: Moves all extremities. No obvious deformities. No edema. No calf tenderness.  Skin: Warm and dry.  Neurological:  Alert, awake, and appropriate.  Normal speech.  No acute focal neurological deficits are appreciated.  Psychiatric: Normal affect. Good eye contact. Appropriate in content.     ED Course   Procedures  ED Vital Signs:  Vitals:    04/03/20 0651 04/03/20 0758 04/03/20 0759 04/03/20 0801   BP: 136/80 (S) 131/69 (S) 136/74 (S) 125/71   Pulse: 85      Resp: 20      Temp: 98.9 °F (37.2 °C)      TempSrc: Oral      SpO2: 96%      Weight: 89.8 kg (198 lb)      Height: 4' 8" (1.422 m)          Abnormal Lab Results:  Labs Reviewed   CBC W/ AUTO DIFFERENTIAL - Abnormal; " Notable for the following components:       Result Value    Hemoglobin 11.4 (*)     Mean Corpuscular Hemoglobin Conc 30.2 (*)     RDW 15.8 (*)     Immature Granulocytes 1.0 (*)     Immature Grans (Abs) 0.08 (*)     All other components within normal limits   COMPREHENSIVE METABOLIC PANEL - Abnormal; Notable for the following components:    Glucose 229 (*)     All other components within normal limits   URINALYSIS, REFLEX TO URINE CULTURE        All Lab Results:  Results for orders placed or performed during the hospital encounter of 04/03/20   CBC auto differential   Result Value Ref Range    WBC 8.33 3.90 - 12.70 K/uL    RBC 4.13 4.00 - 5.40 M/uL    Hemoglobin 11.4 (L) 12.0 - 16.0 g/dL    Hematocrit 37.8 37.0 - 48.5 %    Mean Corpuscular Volume 92 82 - 98 fL    Mean Corpuscular Hemoglobin 27.6 27.0 - 31.0 pg    Mean Corpuscular Hemoglobin Conc 30.2 (L) 32.0 - 36.0 g/dL    RDW 15.8 (H) 11.5 - 14.5 %    Platelets 309 150 - 350 K/uL    MPV 9.6 9.2 - 12.9 fL    Immature Granulocytes 1.0 (H) 0.0 - 0.5 %    Gran # (ANC) 4.2 1.8 - 7.7 K/uL    Immature Grans (Abs) 0.08 (H) 0.00 - 0.04 K/uL    Lymph # 3.4 1.0 - 4.8 K/uL    Mono # 0.4 0.3 - 1.0 K/uL    Eos # 0.2 0.0 - 0.5 K/uL    Baso # 0.06 0.00 - 0.20 K/uL    nRBC 0 0 /100 WBC    Gran% 50.4 38.0 - 73.0 %    Lymph% 40.8 18.0 - 48.0 %    Mono% 4.7 4.0 - 15.0 %    Eosinophil% 2.4 0.0 - 8.0 %    Basophil% 0.7 0.0 - 1.9 %    Differential Method Automated    Comprehensive metabolic panel   Result Value Ref Range    Sodium 137 136 - 145 mmol/L    Potassium 4.8 3.5 - 5.1 mmol/L    Chloride 100 95 - 110 mmol/L    CO2 25 23 - 29 mmol/L    Glucose 229 (H) 70 - 110 mg/dL    BUN, Bld 15 6 - 20 mg/dL    Creatinine 0.7 0.5 - 1.4 mg/dL    Calcium 9.7 8.7 - 10.5 mg/dL    Total Protein 7.5 6.0 - 8.4 g/dL    Albumin 3.7 3.5 - 5.2 g/dL    Total Bilirubin 0.2 0.1 - 1.0 mg/dL    Alkaline Phosphatase 96 55 - 135 U/L    AST 15 10 - 40 U/L    ALT 32 10 - 44 U/L    Anion Gap 12 8 - 16 mmol/L     eGFR if African American >60 >60 mL/min/1.73 m^2    eGFR if non African American >60 >60 mL/min/1.73 m^2         Imaging Results:  Imaging Results          X-Ray Chest AP Portable (Final result)  Result time 04/03/20 08:36:30    Final result by Bryan Webb Jr., MD (04/03/20 08:36:30)                 Impression:      No acute findings.      Electronically signed by: Bryan Webb Jr., MD  Date:    04/03/2020  Time:    08:36             Narrative:    EXAMINATION:  XR CHEST AP PORTABLE    CLINICAL HISTORY:  weakness;    COMPARISON:  PA and lateral from January 17, 2020.    FINDINGS:  Low lung volumes.  Crowding of lung markings.  No definite airspace opacity are ground-glass/interstitial opacity.  No pleural fluid or pneumothorax.  Heart size within normal limits.  No significant bony findings.                                  EKG Readings: (Independently Interpreted)   Rhythm: Normal Sinus Rhythm. Heart Rate: 83. Ectopy: No Ectopy. Conduction: Normal. ST Segments: Normal ST Segments. T Waves: Normal. Clinical Impression: Normal Sinus Rhythm        The Emergency Provider reviewed the vital signs and test results, which are outlined above.     ED Discussion     8:58 AM: Reassessed pt at this time.  Pt states her condition has improved at this time. Discussed with pt all pertinent ED information and results. Discussed pt dx and plan of tx. Gave pt all f/u and return to the ED instructions. All questions and concerns were addressed at this time. Pt expresses understanding of information and instructions, and is comfortable with plan to discharge. Pt is stable for discharge.    I discussed with patient and/or family/caretaker that evaluation in the ED does not suggest any emergent or life threatening medical conditions requiring immediate intervention beyond what was provided in the ED, and I believe patient is safe for discharge.  Regardless, an unremarkable evaluation in the ED does not preclude the development or  presence of a serious of life threatening condition. As such, patient was instructed to return immediately for any worsening or change in current symptoms.         Medical Decision Making:   Clinical Tests:   Lab Tests: Ordered and Reviewed  Radiological Study: Ordered and Reviewed  Medical Tests: Ordered and Reviewed           ED Medication(s):  Medications - No data to display    New Prescriptions    DIPHENOXYLATE-ATROPINE 2.5-0.025 MG (LOMOTIL) 2.5-0.025 MG PER TABLET    Take 1 tablet by mouth 4 (four) times daily as needed for Diarrhea.       Follow-up Information     L Ady Beach MD.    Specialty:  Family Medicine  Contact information:  [address]  [place] [address]  [phone]                       Scribe Attestation:   Scribe #1: I performed the above scribed service and the documentation accurately describes the services I performed. I attest to the accuracy of the note.     Attending:   Physician Attestation Statement for Scribe #1: I, Christopher Yost MD, personally performed the services described in this documentation, as scribed by Bernice Brown, in my presence, and it is both accurate and complete.           Clinical Impression       ICD-10-CM ICD-9-CM   1. Diarrhea, unspecified type R19.7 787.91   2. Weakness R53.1 780.79       Disposition:   Disposition: Discharged  Condition: Stable         Christopher Yost MD  04/03/20 0900

## 2020-04-03 NOTE — TELEPHONE ENCOUNTER
Spoke with patient and she went to the emergency room today for continuing diarrhea, weakness, headache and fatigue. She stated that they gave her a prescription for Lomotil and told her to follow up with her primary care physician.

## 2020-04-03 NOTE — ED NOTES
Pt sat on bedside commode for attempt to provide urine specimen but was unable to urinate.  Will notify ED physician.

## 2020-04-05 ENCOUNTER — NURSE TRIAGE (OUTPATIENT)
Dept: ADMINISTRATIVE | Facility: CLINIC | Age: 50
End: 2020-04-05

## 2020-04-05 NOTE — TELEPHONE ENCOUNTER
49 year old female reports sore throat x2 weeks that is getting worse. She reports she is drooling out her saliva and unable to swallow to it. Per protocol she was advised to go to the ED for further evaluation. Patient reports she has already been evaluated in the ED for the same symptoms. I don't believe she will follow disposition. She would like to speak with her PCP office.       Reason for Disposition   [1] Drooling or spitting out saliva (because can't swallow) AND [2] normal breathing    Additional Information   Negative: Severe difficulty breathing (e.g., struggling for each breath, speaks in single words, stridor)   Negative: Sounds like a life-threatening emergency to the triager    Protocols used: ST SORE THROAT-A-

## 2020-04-06 ENCOUNTER — PATIENT MESSAGE (OUTPATIENT)
Dept: INTERNAL MEDICINE | Facility: CLINIC | Age: 50
End: 2020-04-06

## 2020-04-06 ENCOUNTER — OFFICE VISIT (OUTPATIENT)
Dept: INTERNAL MEDICINE | Facility: CLINIC | Age: 50
End: 2020-04-06
Payer: MEDICAID

## 2020-04-06 ENCOUNTER — TELEPHONE (OUTPATIENT)
Dept: INTERNAL MEDICINE | Facility: CLINIC | Age: 50
End: 2020-04-06

## 2020-04-06 DIAGNOSIS — R06.02 SOB (SHORTNESS OF BREATH): ICD-10-CM

## 2020-04-06 DIAGNOSIS — E11.65 TYPE 2 DIABETES MELLITUS WITH HYPERGLYCEMIA, WITH LONG-TERM CURRENT USE OF INSULIN: Chronic | ICD-10-CM

## 2020-04-06 DIAGNOSIS — J45.30 MILD PERSISTENT ASTHMA WITHOUT COMPLICATION: Chronic | ICD-10-CM

## 2020-04-06 DIAGNOSIS — R52 GENERALIZED BODY ACHES: ICD-10-CM

## 2020-04-06 DIAGNOSIS — R19.7 DIARRHEA, UNSPECIFIED TYPE: ICD-10-CM

## 2020-04-06 DIAGNOSIS — Z20.822 SUSPECTED COVID-19 VIRUS INFECTION: Primary | ICD-10-CM

## 2020-04-06 DIAGNOSIS — E11.42 DIABETIC POLYNEUROPATHY ASSOCIATED WITH TYPE 2 DIABETES MELLITUS: Chronic | ICD-10-CM

## 2020-04-06 DIAGNOSIS — Z79.4 TYPE 2 DIABETES MELLITUS WITH HYPERGLYCEMIA, WITH LONG-TERM CURRENT USE OF INSULIN: Chronic | ICD-10-CM

## 2020-04-06 PROCEDURE — 99214 PR OFFICE/OUTPT VISIT, EST, LEVL IV, 30-39 MIN: ICD-10-PCS | Mod: 95,,, | Performed by: NURSE PRACTITIONER

## 2020-04-06 PROCEDURE — 99214 OFFICE O/P EST MOD 30 MIN: CPT | Mod: 95,,, | Performed by: NURSE PRACTITIONER

## 2020-04-06 NOTE — TELEPHONE ENCOUNTER
----- Message from Denise Cintron sent at 4/6/2020  8:48 AM CDT -----  Contact: pt  Sore throat, headache, mucas in the back of throat, coughing up mucas.  Please advise.

## 2020-04-06 NOTE — TELEPHONE ENCOUNTER
----- Message from Rashmi Albrecht sent at 4/6/2020  2:04 PM CDT -----  Contact: pt  Type:  Needs Medical Advice    Who Called: Patient  Symptoms (please be specific): diarrhea,sore throat,stomach upset,migriane   How long has patient had these symptoms:  2wks  Pharmacy name and phone #:  [facility]  Would the patient rather a call back or a response via [facility]? Call back  Best Call Back Number: [phone]  Additional Information: pt call earlier

## 2020-04-06 NOTE — TELEPHONE ENCOUNTER
Spoke with patient and she stated that she never heard from Dr. Beach about how bad she feels. She left a message for him on Friday. I informed her that he is now working at the hospital for the next two months and offered her an appointment for a video visit with one of our other providers. She stated that was fine. Appointment scheduled with Teresita Matthews Np for today.

## 2020-04-06 NOTE — TELEPHONE ENCOUNTER
----- Message from Aniya Matta sent at 4/6/2020  9:33 AM CDT -----    Who Called:  Pt   Symptoms (please be specific):  Head cold   How long has patient had these symptoms:  2 weeks  Pharmacy name and phone #:  ..  [facility]  [address]  [place] [address]  Phone: [phone] Fax: [phone]    Would the patient rather a call back or a response via [facility]? Call back   Best Call Back Number:  [phone]  Additional Information: Pt is requesting a call from nurse to discuss head cold. ( w/ no fever )

## 2020-04-07 ENCOUNTER — PATIENT MESSAGE (OUTPATIENT)
Dept: INTERNAL MEDICINE | Facility: CLINIC | Age: 50
End: 2020-04-07

## 2020-04-07 ENCOUNTER — APPOINTMENT (OUTPATIENT)
Dept: INTERNAL MEDICINE | Facility: CLINIC | Age: 50
End: 2020-04-07
Payer: MEDICAID

## 2020-04-07 DIAGNOSIS — R05.9 COUGH: Primary | ICD-10-CM

## 2020-04-07 PROCEDURE — U0002 COVID-19 LAB TEST NON-CDC: HCPCS

## 2020-04-07 NOTE — PROGRESS NOTES
TELEMEDICINE VIRTUAL VISIT      Visit Details: This visit was a telemedicine virtual visit with synchronous audio and video. Yolanda reported that her location at the time of this visit was in the state [facility]. Yolanda had the choice to come into office to receive these medical services. Yolanda chose and consented to receive these medical services by telemedicine.           Patient ID: Yolanda Betts is a 49 y.o. female.    Chief Complaint: multiple symptoms. Concerned about covid-19      HPI    Pt reports diarrhea 5-6 x a day- taking immodium 2-3 x a day. Stool watery to loose. No blood. Reports abd cramping/bloating. Still able to eat/drink. Feels weak- states diarrhea ongoing x 1 week  Reports sore throat, sinus drainage,-2 weeks- taking flonase/sudafed  New symptoms of SOB, body aches x 1-2 days.     Past Medical History:   Diagnosis Date    Abnormal Pap smear of cervix     HPV genital warts    Anemia     Anxiety     Arthritis     Asthma 10/11/2016    Bipolar 1 disorder     Diabetes mellitus, type 2     Dyslipidemia associated with type 2 diabetes mellitus 5/13/2019    Genital warts     GERD (gastroesophageal reflux disease)     Herpes simplex virus (HSV) infection     Hypertension     Hypertension complicating diabetes 5/5/2019    Migraine with aura and without status migrainosus, not intractable 3/21/2016    Mild persistent asthma without complication 10/11/2016    Morbid obesity with body mass index (BMI) of 45.0 to 49.9 in adult 8/3/2017    Obstructive sleep apnea     ALEN (obstructive sleep apnea)     2L per N/C q HS    Schizoaffective disorder, bipolar type 4/25/2019    Seasonal allergic rhinitis due to pollen 5/13/2019    Type 2 diabetes mellitus with hyperglycemia, with long-term current use of insulin 12/21/2017    Type 2 diabetes mellitus with hyperglycemia, without long-term current use of insulin 12/21/2017     Past Surgical History:   Procedure Laterality Date    abcess  removed right back      BELT ABDOMINOPLASTY      BREAST SURGERY Bilateral     Reduction     SECTION      X 2    COLONOSCOPY      COLONOSCOPY N/A 2018    Procedure: colonoscopy for iron deficiency anemia;  Surgeon: Frankie Sanchez MD;  Location: [facility] ENDO;  Service: Endoscopy;  Laterality: N/A;    COLONOSCOPY N/A 2018    Procedure: COLONOSCOPY;  Surgeon: Frankie Sanchez MD;  Location: [facility] ENDO;  Service: Endoscopy;  Laterality: N/A;    HYSTERECTOMY      w/ BSO; hypermenorrhea    MOUTH SURGERY      OOPHORECTOMY      hyst/bso, hypermenorrhea    ROBOT-ASSISTED CHOLECYSTECTOMY USING DA DARI XI N/A 1/3/2020    Procedure: XI ROBOTIC CHOLECYSTECTOMY;  Surgeon: Damir Driscoll MD;  Location: [facility] OR;  Service: General;  Laterality: N/A;    TUBAL LIGATION       Social History     Socioeconomic History    Marital status: Single     Spouse name: Not on file    Number of children: Not on file    Years of education: Not on file    Highest education level: Not on file   Occupational History    Not on file   Social Needs    Financial resource strain: Not on file    Food insecurity:     Worry: Not on file     Inability: Not on file    Transportation needs:     Medical: Not on file     Non-medical: Not on file   Tobacco Use    Smoking status: Never Smoker    Smokeless tobacco: Never Used   Substance and Sexual Activity    Alcohol use: Yes     Alcohol/week: 0.0 standard drinks     Comment: socially  No alcohol 72h prior to sx    Drug use: No    Sexual activity: Not Currently     Partners: Male   Lifestyle    Physical activity:     Days per week: Not on file     Minutes per session: Not on file    Stress: Not on file   Relationships    Social connections:     Talks on phone: Not on file     Gets together: Not on file     Attends Jew service: Not on file     Active member of club or organization: Not on file     Attends meetings of clubs or organizations: Not  "on file     Relationship status: Not on file   Other Topics Concern    Not on file   Social History Narrative    Long-term care nurse     Review of patient's allergies indicates:   Allergen Reactions    Codeine Itching    Neuromuscular blockers, steroidal Hives     some    Latex, natural rubber Rash    Morphine Rash     itching    Norco [hydrocodone-acetaminophen] Itching, Rash and Hallucinations    Seconal [secobarbital sodium] Rash     itching    Tylox [oxycodone-acetaminophen] Rash     Current Outpatient Medications   Medication Sig    ALPRAZolam (XANAX) 2 MG Tab Take 1 tablet by mouth twice daily    amLODIPine (NORVASC) 10 MG tablet Take 1 tablet (10 mg total) by mouth once daily.    ARIPiprazole (ABILIFY) 10 MG Tab Take 1 tablet by mouth every day    aspirin (ECOTRIN) 81 MG EC tablet Take 81 mg by mouth once daily.    atorvastatin (LIPITOR) 20 MG tablet Take 1 tablet (20 mg total) by mouth every evening.    BD INSULIN SYRINGE ULTRA-FINE 1/2 mL 30 gauge x 1/2" Syrg     benztropine (COGENTIN) 1 MG tablet take one tablet by mouth at bedtime    benztropine (COGENTIN) 1 MG tablet Take 1 tablet by mouth at bedtime    blood sugar diagnostic Strp Check blood glucose 4 times daily as directed and as needed (dispense insurance preferred brand or patient choice)    blood-glucose meter Misc Use to check blood sugars, give meter based on insurance specification    cycloSPORINE (RESTASIS) 0.05 % ophthalmic emulsion Place 0.4 mLs (1 drop total) into both eyes 2 (two) times daily.    diphenoxylate-atropine 2.5-0.025 mg (LOMOTIL) 2.5-0.025 mg per tablet Take 1 tablet by mouth 4 (four) times daily as needed for Diarrhea.    DULoxetine (CYMBALTA) 60 MG capsule Take 1 capsule (60 mg total) by mouth 2 (two) times daily.    DULoxetine (CYMBALTA) 60 MG capsule Take 1 capsule by mouth twice daily    ergocalciferol (ERGOCALCIFEROL) 50,000 unit Cap Take 1 capsule (50,000 Units total) by mouth every 14 (fourteen) " days.    fish oil-omega-3 fatty acids 300-1,000 mg capsule Take 1 capsule by mouth once daily.    fluticasone propionate (FLONASE) 50 mcg/actuation nasal spray 2 sprays (100 mcg total) by Each Nare route once daily. (Patient taking differently: 2 sprays by Each Nostril route daily as needed. )    furosemide (LASIX) 20 MG tablet Take 1 tablet (20 mg total) by mouth once daily.    gabapentin (NEURONTIN) 300 MG capsule Take 2 capsules (600 mg total) by mouth 3 (three) times daily.    hydrALAZINE (APRESOLINE) 10 MG tablet Take 1 tablet (10 mg total) by mouth every 12 (twelve) hours.    insulin aspart U-100 (NOVOLOG FLEXPEN U-100 INSULIN) 100 unit/mL (3 mL) InPn pen Inject before meals three times a day up to 30 units daily (Patient taking differently: Inject into the skin 3 (three) times daily with meals. Inject before meals three times a day up to 30 units daily)    lancets 28 gauge Misc use as directed 3 times daily    linaCLOtide (LINZESS) 145 mcg Cap capsule Take 1 capsule (145 mcg total) by mouth daily as needed (for constipation).    liraglutide 0.6 mg/0.1 mL, 18 mg/3 mL, subq PNIJ (VICTOZA 2-MILENA) 0.6 mg/0.1 mL (18 mg/3 mL) PnIj Use 1.8mg under the skin daily (Patient taking differently: Inject 1.8 mg into the skin once daily. Use 1.8mg under the skin daily)    loperamide (IMODIUM) 2 mg capsule Take 1 capsule (2 mg total) by mouth 4 (four) times daily as needed for Diarrhea.    lurasidone (LATUDA) 120 mg Tab Take 1 tablet by mouth at 5 pm with food (Patient taking differently: Take 120 mg by mouth nightly. )    lurasidone (LATUDA) 120 mg Tab Take 1 tablet by mouth at 5 p.m. with food    lurasidone 120 mg Tab Take one tablet by mouth daily at 5:00pm with food    magnesium oxide (MAG-OX) 400 mg (241.3 mg magnesium) tablet Take 1 tablet (400 mg total) by mouth once daily.    meclizine (ANTIVERT) 25 mg tablet Take 1 tablet by mouth every day as needed until directed to stop.    metFORMIN (GLUCOPHAGE)  "1000 MG tablet TAKE 1 TABLET (1,000 MG TOTAL) BY MOUTH 2 (TWO) TIMES DAILY.    metoprolol succinate (TOPROL-XL) 100 MG 24 hr tablet Take 2 tablets (200 mg total) by mouth every evening.    mirtazapine (REMERON) 45 MG tablet Take 1 tablet by mouth at bedtime    mometasone (ASMANEX TWISTHALER) 220 mcg (120 doses) AePB Inhale 2 puffs into the lungs 2 (two) times daily. Controller    MULTIVIT,CALC,MINS/IRON/FOLIC (DAILY MULTIPLE FOR WOMEN ORAL) Take 1 tablet by mouth once daily.    pen needle, diabetic (BD ULTRA-FINE MINI PEN NEEDLE) 31 gauge x 3/16" Ndle Use 5 a day    spironolactone (ALDACTONE) 25 MG tablet Take 1 tablet (25 mg total) by mouth once daily.    topiramate (TOPAMAX) 50 MG tablet Take 1 tablet (50 mg total) by mouth 2 (two) times daily.    TOUJEO SOLOSTAR U-300 INSULIN 300 unit/mL (1.5 mL) InPn pen Inject 80 Units into the skin every evening. Needs 9 pens for 1 month so pharmacy please give adequate amount  and call office for questions    triazolam (HALCION) 0.25 MG Tab Take 1 tablet by mouth at bedtime    valACYclovir (VALTREX) 1000 MG tablet Take 1 tablet (1,000 mg total) by mouth once daily.    valsartan (DIOVAN) 320 MG tablet Take 1 tablet (320 mg total) by mouth once daily.     No current facility-administered medications for this visit.            Review of Systems   Constitutional: Positive for activity change. Negative for appetite change, chills, diaphoresis, fatigue, fever and unexpected weight change.   HENT: Positive for congestion, postnasal drip and sore throat. Negative for ear pain, rhinorrhea, sinus pressure, sinus pain, sneezing, tinnitus, trouble swallowing and voice change.    Eyes: Negative for photophobia, pain and visual disturbance.   Respiratory: Positive for shortness of breath. Negative for cough, chest tightness and wheezing.    Cardiovascular: Negative for chest pain, palpitations and leg swelling.   Gastrointestinal: Positive for diarrhea. Negative for abdominal " distention, abdominal pain, constipation, nausea and vomiting.   Genitourinary: Negative for decreased urine volume, difficulty urinating, dysuria, flank pain, frequency, hematuria and urgency.   Musculoskeletal: Positive for myalgias. Negative for arthralgias, back pain, joint swelling, neck pain and neck stiffness.   Allergic/Immunologic: Negative for immunocompromised state.   Neurological: Positive for weakness. Negative for dizziness, tremors, seizures, syncope, facial asymmetry, speech difficulty, light-headedness, numbness and headaches.   Hematological: Negative for adenopathy. Does not bruise/bleed easily.   Psychiatric/Behavioral: Negative for confusion and sleep disturbance.       Objective:      Physical Exam      PHYSICAL EXAM  CONSTITUTIONAL: No apparent distress. Does not appear acutely ill or septic. Appears adequately hydrated.  PULM: Breathing unlabored.  PSYCHIATRIC: Alert and conversant and grossly oriented. Mood is grossly neutral. Affect appropriate. Judgment and insight grossly intact.      Assessment:   There were no vitals filed for this visit.      1. Suspected Covid-19 Virus Infection    2. SOB (shortness of breath)    3. Generalized body aches    4. Diarrhea, unspecified type    5. Mild persistent asthma without complication    6. Diabetic polyneuropathy associated with type 2 diabetes mellitus    7. Type 2 diabetes mellitus with hyperglycemia, with long-term current use of insulin        Plan:   Suspected Covid-19 Virus Infection  -     COVID-19 Home Symptom Monitoring  - Duration (days): 14  -     SARS- CoV-2 (COVID-19) QUALITATIVE PCR    SOB (shortness of breath)  -     COVID-19 Home Symptom Monitoring  - Duration (days): 14  -     SARS- CoV-2 (COVID-19) QUALITATIVE PCR    Generalized body aches  -     COVID-19 Home Symptom Monitoring  - Duration (days): 14  -     SARS- CoV-2 (COVID-19) QUALITATIVE PCR    Diarrhea, unspecified type  -     COVID-19 Home Symptom Monitoring  - Duration  (days): 14  -     SARS- CoV-2 (COVID-19) QUALITATIVE PCR    Mild persistent asthma without complication  -     COVID-19 Home Symptom Monitoring  - Duration (days): 14  -     SARS- CoV-2 (COVID-19) QUALITATIVE PCR    Diabetic polyneuropathy associated with type 2 diabetes mellitus  -     COVID-19 Home Symptom Monitoring  - Duration (days): 14  -     SARS- CoV-2 (COVID-19) QUALITATIVE PCR    Type 2 diabetes mellitus with hyperglycemia, with long-term current use of insulin  -     COVID-19 Home Symptom Monitoring  - Duration (days): 14  -     SARS- CoV-2 (COVID-19) QUALITATIVE PCR    discussed symptomatic care for sinus/diarrheal issues  Discussed worsening s/s that warrants immediate ER eval  Pt will go to covid testing site on tomorrow to r/o covid  Will give special instruction for awaiting covid test results

## 2020-04-08 LAB — SARS-COV-2 RNA RESP QL NAA+PROBE: NOT DETECTED

## 2020-04-11 ENCOUNTER — HOSPITAL ENCOUNTER (EMERGENCY)
Facility: HOSPITAL | Age: 50
Discharge: HOME | End: 2020-04-11
Attending: EMERGENCY MEDICINE
Payer: MEDICAID

## 2020-04-11 ENCOUNTER — NURSE TRIAGE (OUTPATIENT)
Dept: ADMINISTRATIVE | Facility: CLINIC | Age: 50
End: 2020-04-11

## 2020-04-11 VITALS
OXYGEN SATURATION: 98 % | BODY MASS INDEX: 45.72 KG/M2 | DIASTOLIC BLOOD PRESSURE: 77 MMHG | TEMPERATURE: 99 F | SYSTOLIC BLOOD PRESSURE: 126 MMHG | HEART RATE: 95 BPM | RESPIRATION RATE: 17 BRPM | WEIGHT: 203.94 LBS

## 2020-04-11 DIAGNOSIS — R06.00 DYSPNEA, UNSPECIFIED TYPE: Primary | ICD-10-CM

## 2020-04-11 DIAGNOSIS — R07.9 CHEST PAIN: ICD-10-CM

## 2020-04-11 DIAGNOSIS — I10 HYPERTENSION, UNSPECIFIED TYPE: ICD-10-CM

## 2020-04-11 LAB
ALBUMIN SERPL BCP-MCNC: 3.9 G/DL (ref 3.5–5.2)
ALP SERPL-CCNC: 90 U/L (ref 55–135)
ALT SERPL W/O P-5'-P-CCNC: 30 U/L (ref 10–44)
ANION GAP SERPL CALC-SCNC: 11 MMOL/L (ref 8–16)
AST SERPL-CCNC: 20 U/L (ref 10–40)
BASOPHILS # BLD AUTO: 0.07 K/UL (ref 0–0.2)
BASOPHILS NFR BLD: 0.7 % (ref 0–1.9)
BILIRUB SERPL-MCNC: 0.2 MG/DL (ref 0.1–1)
BNP SERPL-MCNC: <10 PG/ML (ref 0–99)
BUN SERPL-MCNC: 11 MG/DL (ref 6–20)
CALCIUM SERPL-MCNC: 9.6 MG/DL (ref 8.7–10.5)
CHLORIDE SERPL-SCNC: 97 MMOL/L (ref 95–110)
CO2 SERPL-SCNC: 24 MMOL/L (ref 23–29)
CREAT SERPL-MCNC: 0.7 MG/DL (ref 0.5–1.4)
DIFFERENTIAL METHOD: ABNORMAL
EOSINOPHIL # BLD AUTO: 0.2 K/UL (ref 0–0.5)
EOSINOPHIL NFR BLD: 1.8 % (ref 0–8)
ERYTHROCYTE [DISTWIDTH] IN BLOOD BY AUTOMATED COUNT: 15.9 % (ref 11.5–14.5)
EST. GFR  (AFRICAN AMERICAN): >60 ML/MIN/1.73 M^2
EST. GFR  (NON AFRICAN AMERICAN): >60 ML/MIN/1.73 M^2
GLUCOSE SERPL-MCNC: 294 MG/DL (ref 70–110)
HCT VFR BLD AUTO: 38.5 % (ref 37–48.5)
HGB BLD-MCNC: 11.9 G/DL (ref 12–16)
HIV 1+2 AB+HIV1 P24 AG SERPL QL IA: NEGATIVE
IMM GRANULOCYTES # BLD AUTO: 0.06 K/UL (ref 0–0.04)
IMM GRANULOCYTES NFR BLD AUTO: 0.6 % (ref 0–0.5)
LYMPHOCYTES # BLD AUTO: 3.9 K/UL (ref 1–4.8)
LYMPHOCYTES NFR BLD: 36.4 % (ref 18–48)
MCH RBC QN AUTO: 28.2 PG (ref 27–31)
MCHC RBC AUTO-ENTMCNC: 30.9 G/DL (ref 32–36)
MCV RBC AUTO: 91 FL (ref 82–98)
MONOCYTES # BLD AUTO: 0.5 K/UL (ref 0.3–1)
MONOCYTES NFR BLD: 4.5 % (ref 4–15)
NEUTROPHILS # BLD AUTO: 5.9 K/UL (ref 1.8–7.7)
NEUTROPHILS NFR BLD: 56 % (ref 38–73)
NRBC BLD-RTO: 0 /100 WBC
PLATELET # BLD AUTO: 303 K/UL (ref 150–350)
PMV BLD AUTO: 10 FL (ref 9.2–12.9)
POTASSIUM SERPL-SCNC: 4.4 MMOL/L (ref 3.5–5.1)
PROT SERPL-MCNC: 7.5 G/DL (ref 6–8.4)
RBC # BLD AUTO: 4.22 M/UL (ref 4–5.4)
SODIUM SERPL-SCNC: 132 MMOL/L (ref 136–145)
TROPONIN I SERPL DL<=0.01 NG/ML-MCNC: 0.01 NG/ML (ref 0–0.03)
TROPONIN I SERPL DL<=0.01 NG/ML-MCNC: <0.006 NG/ML (ref 0–0.03)
WBC # BLD AUTO: 10.59 K/UL (ref 3.9–12.7)

## 2020-04-11 PROCEDURE — 93010 ELECTROCARDIOGRAM REPORT: CPT | Mod: ,,, | Performed by: INTERNAL MEDICINE

## 2020-04-11 PROCEDURE — 93010 EKG 12-LEAD: ICD-10-PCS | Mod: ,,, | Performed by: INTERNAL MEDICINE

## 2020-04-11 PROCEDURE — 80053 COMPREHEN METABOLIC PANEL: CPT | Mod: ER

## 2020-04-11 PROCEDURE — 83880 ASSAY OF NATRIURETIC PEPTIDE: CPT

## 2020-04-11 PROCEDURE — 85025 COMPLETE CBC W/AUTO DIFF WBC: CPT

## 2020-04-11 PROCEDURE — 93005 ELECTROCARDIOGRAM TRACING: CPT

## 2020-04-11 PROCEDURE — 86703 HIV-1/HIV-2 1 RESULT ANTBDY: CPT

## 2020-04-11 PROCEDURE — 84484 ASSAY OF TROPONIN QUANT: CPT | Mod: 91

## 2020-04-11 PROCEDURE — 99285 EMERGENCY DEPT VISIT HI MDM: CPT | Mod: 25

## 2020-04-11 PROCEDURE — 25000003 PHARM REV CODE 250: Performed by: EMERGENCY MEDICINE

## 2020-04-11 RX ORDER — ASPIRIN 325 MG
325 TABLET ORAL
Status: COMPLETED | OUTPATIENT
Start: 2020-04-11 | End: 2020-04-11

## 2020-04-11 RX ADMIN — ASPIRIN 325 MG: 325 TABLET, FILM COATED ORAL at 06:04

## 2020-04-11 NOTE — TELEPHONE ENCOUNTER
Reason for Disposition   [1] Systolic BP  >= 160 OR Diastolic >= 100 AND [2] cardiac or neurologic symptoms (e.g., chest pain, difficulty breathing, unsteady gait, blurred vision)    Additional Information   Negative: Difficult to awaken or acting confused (e.g., disoriented, slurred speech)   Negative: Severe difficulty breathing (e.g., struggling for each breath, speaks in single words)   Negative: [1] Weakness of the face, arm or leg on one side of the body AND [2] new onset   Negative: [1] Numbness (i.e., loss of sensation) of the face, arm or leg on one side of the body AND [2] new onset   Negative: [1] Chest pain lasts > 5 minutes AND [2] history of heart disease  (i.e., heart attack, bypass surgery, angina, angioplasty, CHF)   Negative: [1] Chest pain AND [2] took nitrogylcerin AND [3] pain was not relieved   Negative: Sounds like a life-threatening emergency to the triager   Negative: Symptom is main concern  (e.g., headache, chest pain)   Negative: Low blood pressure is main concern    Protocols used: HIGH BLOOD PRESSURE-A-AH  Pt called re 'feeling really bad'. BP still high. C/o HA, nausea. No vomiting. 148/90s HR= , earlier 159/70 HR=97. Pt taking BP meds as prescribed. BP started elevating yest. rates HA 10. Afeb. no stiff neck. rec ED. Pt agrees and states she will call her ride to take her to ED. Call back with questions

## 2020-04-11 NOTE — ED PROVIDER NOTES
SCRIBE #1 NOTE: I, Caitlyn Espinosa, am scribing for, and in the presence of, Otto Moreno MD. I have scribed the entire note.       History     Chief Complaint   Patient presents with    Chest Pain     chest pressure, nausea, diarrhea     Review of patient's allergies indicates:   Allergen Reactions    Codeine Itching    Neuromuscular blockers, steroidal Hives     some    Latex, natural rubber Rash    Morphine Rash     itching    Norco [hydrocodone-acetaminophen] Itching, Rash and Hallucinations    Seconal [secobarbital sodium] Rash     itching    Tylox [oxycodone-acetaminophen] Rash         History of Present Illness     HPI    4/11/2020, 6:22 PM  History obtained from the patient      History of Present Illness: Yolanda Betts is a 49 y.o. female patient with a PMHx of type 2 DM, HTN, schizoaffective disorder, morbid obesity, asthma, and ALEN who presents to the Emergency Department for evaluation of mild SOB which onset gradually x1 week ago. Pt c/o a high BP reading of 159/107 at home today. Symptoms are constant and moderate in severity. Exacerbated by exertion and lying flat. No mitigating factors reported. Associated sxs include CP, dizziness, light-headedness, nausea and diarrhea. Patient denies any leg swelling, palpitations, cough, fever, chills, numbness, weakness, vomiting, and all other sxs at this time. Pt notes she tested negative for COVID-19 x1 week ago. Pt also notes she takes Lasix daily. No further complaints or concerns at this time.       Arrival mode: Personal vehicle      PCP: CHARLA Beach MD        Past Medical History:  Past Medical History:   Diagnosis Date    Abnormal Pap smear of cervix     HPV genital warts    Anemia     Anxiety     Arthritis     Asthma 10/11/2016    Bipolar 1 disorder     Diabetes mellitus, type 2     Dyslipidemia associated with type 2 diabetes mellitus 5/13/2019    Genital warts     GERD (gastroesophageal reflux disease)     Herpes simplex  "virus (HSV) infection     Hypertension     Hypertension complicating diabetes 2019    Migraine with aura and without status migrainosus, not intractable 3/21/2016    Mild persistent asthma without complication 10/11/2016    Morbid obesity with body mass index (BMI) of 45.0 to 49.9 in adult 8/3/2017    Obstructive sleep apnea     ALEN (obstructive sleep apnea)     2L per N/C q HS    Schizoaffective disorder, bipolar type 2019    Seasonal allergic rhinitis due to pollen 2019    Type 2 diabetes mellitus with hyperglycemia, with long-term current use of insulin 2017    Type 2 diabetes mellitus with hyperglycemia, without long-term current use of insulin 2017       Past Surgical History:  Past Surgical History:   Procedure Laterality Date    abcess removed right back      BELT ABDOMINOPLASTY      BREAST SURGERY Bilateral 1996    Reduction     SECTION      X 2    COLONOSCOPY      COLONOSCOPY N/A 2018    Procedure: colonoscopy for iron deficiency anemia;  Surgeon: Frankie Sanchez MD;  Location: [facility] ENDO;  Service: Endoscopy;  Laterality: N/A;    COLONOSCOPY N/A 2018    Procedure: COLONOSCOPY;  Surgeon: Frankie Sanchez MD;  Location: [facility] ENDO;  Service: Endoscopy;  Laterality: N/A;    HYSTERECTOMY      w/ BSO; hypermenorrhea    MOUTH SURGERY      OOPHORECTOMY      hyst/bso, hypermenorrhea    ROBOT-ASSISTED CHOLECYSTECTOMY USING DA DARI XI N/A 1/3/2020    Procedure: XI ROBOTIC CHOLECYSTECTOMY;  Surgeon: Damir Driscoll MD;  Location: [facility] OR;  Service: General;  Laterality: N/A;    TUBAL LIGATION           Family History:  Family History   Problem Relation Age of Onset    Diabetes Mother     Hyperlipidemia Mother     Hypertension Mother     Asthma Mother     COPD Mother     Glaucoma Mother     Thyroid disease Mother     Anesthesia problems Mother         "almost had a cardiac arrest" , blood clots    Hypertension Father     " Hyperlipidemia Father     Cancer Father         Brain, lung, liver, kidney    Heart disease Maternal Grandmother     Hyperlipidemia Maternal Grandmother     Hypertension Maternal Grandmother     Cataracts Maternal Grandmother     Diabetes Maternal Grandmother     Heart disease Maternal Grandfather     Hyperlipidemia Maternal Grandfather     Hypertension Maternal Grandfather     Glaucoma Maternal Grandfather     Cancer Maternal Grandfather     Cataracts Maternal Grandfather     Macular degeneration Maternal Grandfather     Diabetes Maternal Grandfather     Heart disease Paternal Grandmother     Hyperlipidemia Paternal Grandmother     Hypertension Paternal Grandmother     Cataracts Paternal Grandmother     Heart disease Paternal Grandfather     Hyperlipidemia Paternal Grandfather     Hypertension Paternal Grandfather     Cataracts Paternal Grandfather     Breast cancer Maternal Cousin     Breast cancer Maternal Cousin     Breast cancer Maternal Cousin     Breast cancer Maternal Cousin        Social History:  Social History     Tobacco Use    Smoking status: Never Smoker    Smokeless tobacco: Never Used   Substance and Sexual Activity    Alcohol use: Yes     Alcohol/week: 0.0 standard drinks     Comment: socially  No alcohol 72h prior to sx    Drug use: No    Sexual activity: Not Currently     Partners: Male        Review of Systems     Review of Systems   Constitutional: Negative for chills and fever.   HENT: Negative for sore throat.    Respiratory: Positive for shortness of breath (mild). Negative for cough.    Cardiovascular: Positive for chest pain. Negative for palpitations and leg swelling.        (+) high BP reading     Gastrointestinal: Positive for diarrhea and nausea. Negative for vomiting.   Genitourinary: Negative for dysuria.   Musculoskeletal: Negative for back pain.   Skin: Negative for rash.   Neurological: Positive for dizziness and light-headedness. Negative for  weakness and numbness.   Hematological: Does not bruise/bleed easily.   All other systems reviewed and are negative.     Physical Exam     Initial Vitals [04/11/20 1757]   BP Pulse Resp Temp SpO2   136/82 100 18 98.9 °F (37.2 °C) 96 %      MAP       --          Physical Exam  Nursing Notes and Vital Signs Reviewed.  Constitutional: Patient is in no acute distress. Well-developed and well-nourished.  Head: Atraumatic. Normocephalic.  Eyes: PERRL. EOM intact. Conjunctivae are not pale. No scleral icterus.  ENT: Mucous membranes are moist. Oropharynx is clear and symmetric.    Neck: Supple. Full ROM. No lymphadenopathy.  Cardiovascular: Regular rate. Regular rhythm. No murmurs, rubs, or gallops. Distal pulses are 2+ and symmetric.  Pulmonary/Chest: No respiratory distress. Clear to auscultation bilaterally. No wheezing or rales.  Abdominal: Soft and non-distended.  There is no tenderness.  No rebound, guarding, or rigidity. Good bowel sounds.  Genitourinary: No CVA tenderness  Musculoskeletal: Moves all extremities. No obvious deformities. No edema. No calf tenderness.  Skin: Warm and dry.  Neurological:  Alert, awake, and appropriate.  Normal speech.  No acute focal neurological deficits are appreciated.  Psychiatric: Normal affect. Good eye contact. Appropriate in content.     ED Course   Procedures  ED Vital Signs:  Vitals:    04/11/20 1757 04/11/20 1901 04/11/20 2001 04/11/20 2101   BP: 136/82 123/65 121/72 122/67   Pulse: 100 93 93 91   Resp: 18 18 (!) 32 (!) 21   Temp: 98.9 °F (37.2 °C)      TempSrc: Oral      SpO2: 96% 99% 99% 98%   Weight: 92.5 kg (203 lb 14.8 oz)       04/11/20 2233   BP: 126/77   Pulse: 95   Resp: 17   Temp: 98.7 °F (37.1 °C)   TempSrc: Oral   SpO2: 98%   Weight:        Abnormal Lab Results:  Labs Reviewed   CBC W/ AUTO DIFFERENTIAL - Abnormal; Notable for the following components:       Result Value    Hemoglobin 11.9 (*)     Mean Corpuscular Hemoglobin Conc 30.9 (*)     RDW 15.9 (*)      Immature Granulocytes 0.6 (*)     Immature Grans (Abs) 0.06 (*)     All other components within normal limits   COMPREHENSIVE METABOLIC PANEL - Abnormal; Notable for the following components:    Sodium 132 (*)     Glucose 294 (*)     All other components within normal limits   HIV 1 / 2 ANTIBODY   TROPONIN I   B-TYPE NATRIURETIC PEPTIDE   TROPONIN I        All Lab Results:  Results for orders placed or performed during the hospital encounter of 04/11/20   HIV 1/2 Ag/Ab (4th Gen)   Result Value Ref Range    HIV 1/2 Ag/Ab Negative Negative   CBC auto differential   Result Value Ref Range    WBC 10.59 3.90 - 12.70 K/uL    RBC 4.22 4.00 - 5.40 M/uL    Hemoglobin 11.9 (L) 12.0 - 16.0 g/dL    Hematocrit 38.5 37.0 - 48.5 %    Mean Corpuscular Volume 91 82 - 98 fL    Mean Corpuscular Hemoglobin 28.2 27.0 - 31.0 pg    Mean Corpuscular Hemoglobin Conc 30.9 (L) 32.0 - 36.0 g/dL    RDW 15.9 (H) 11.5 - 14.5 %    Platelets 303 150 - 350 K/uL    MPV 10.0 9.2 - 12.9 fL    Immature Granulocytes 0.6 (H) 0.0 - 0.5 %    Gran # (ANC) 5.9 1.8 - 7.7 K/uL    Immature Grans (Abs) 0.06 (H) 0.00 - 0.04 K/uL    Lymph # 3.9 1.0 - 4.8 K/uL    Mono # 0.5 0.3 - 1.0 K/uL    Eos # 0.2 0.0 - 0.5 K/uL    Baso # 0.07 0.00 - 0.20 K/uL    nRBC 0 0 /100 WBC    Gran% 56.0 38.0 - 73.0 %    Lymph% 36.4 18.0 - 48.0 %    Mono% 4.5 4.0 - 15.0 %    Eosinophil% 1.8 0.0 - 8.0 %    Basophil% 0.7 0.0 - 1.9 %    Differential Method Automated    Troponin I #2   Result Value Ref Range    Troponin I <0.006 0.000 - 0.026 ng/mL   B-Type natriuretic peptide (BNP)   Result Value Ref Range    BNP <10 0 - 99 pg/mL   Comprehensive metabolic panel   Result Value Ref Range    Sodium 132 (L) 136 - 145 mmol/L    Potassium 4.4 3.5 - 5.1 mmol/L    Chloride 97 95 - 110 mmol/L    CO2 24 23 - 29 mmol/L    Glucose 294 (H) 70 - 110 mg/dL    BUN, Bld 11 6 - 20 mg/dL    Creatinine 0.7 0.5 - 1.4 mg/dL    Calcium 9.6 8.7 - 10.5 mg/dL    Total Protein 7.5 6.0 - 8.4 g/dL    Albumin 3.9 3.5 -  5.2 g/dL    Total Bilirubin 0.2 0.1 - 1.0 mg/dL    Alkaline Phosphatase 90 55 - 135 U/L    AST 20 10 - 40 U/L    ALT 30 10 - 44 U/L    Anion Gap 11 8 - 16 mmol/L    eGFR if African American >60.0 >60 mL/min/1.73 m^2    eGFR if non African American >60.0 >60 mL/min/1.73 m^2   Troponin I   Result Value Ref Range    Troponin I 0.006 0.000 - 0.026 ng/mL     Imaging Results:  Imaging Results          X-Ray Chest AP Portable (Final result)  Result time 04/11/20 20:51:39    Final result by Chandu Mejia MD (04/11/20 20:51:39)                 Impression:      No acute findings.      Electronically signed by: Chandu Mejia MD  Date:    04/11/2020  Time:    20:51             Narrative:    EXAMINATION:  XR CHEST AP PORTABLE    CLINICAL HISTORY:  Chest Pain;    TECHNIQUE:  Single frontal view of the chest was performed.    COMPARISON:  01/17/2020    FINDINGS:  Heart size accentuated by shallow inspiration and technique appearing mildly enlarged..    Shallow inspiration.  Lungs are clear.    Bones are unremarkable.                                 The EKG was ordered, reviewed, and independently interpreted by the ED provider.  Interpretation time: 1804  Rate: 98 BPM  Rhythm: normal sinus rhythm  Interpretation: No acute ST changes. No STEMI.           The Emergency Provider reviewed the vital signs and test results, which are outlined above.     ED Discussion     10:33 PM: Reassessed pt at this time.  Pt states her condition has improved at this time. Discussed with pt all pertinent ED information and results. Discussed pt dx and plan of tx. Gave pt all f/u and return to the ED instructions. All questions and concerns were addressed at this time. Pt expresses understanding of information and instructions, and is comfortable with plan to discharge. Pt is stable for discharge.    I discussed with patient and/or family/caretaker that evaluation in the ED does not suggest any emergent or life threatening medical conditions  requiring immediate intervention beyond what was provided in the ED, and I believe patient is safe for discharge.  Regardless, an unremarkable evaluation in the ED does not preclude the development or presence of a serious of life threatening condition. As such, patient was instructed to return immediately for any worsening or change in current symptoms.      ED Course as of Apr 12 0058   Sat Apr 11, 2020   2225 Glucose(!): 294 [BA]   2225 BNP: <10 [BA]   2226 PERC negative    [BA]   2226 Heart score is 3    [BA]   2228 Stable for d/c.     [BA]      ED Course User Index  [BA] Otto Moreno MD               ED Medication(s):  Medications   aspirin tablet 325 mg (325 mg Oral Given 4/11/20 1850)       Discharge Medication List as of 4/11/2020 10:30 PM          Follow-up Information     CHARLA Beach MD. Call in 1 day.    Specialty:  Family Medicine  Why:  For re-evaluation and further treatment  Contact information:  [id number]  [place] [address] [phone]             [facility]. Go today.    Specialty:  Emergency Medicine  Why:  If symptoms worsen, For re-evaluation and further treatment, As needed  Contact information:  [address] [facility] [address] [phone]                     Scribe Attestation:   Scribe #1: I performed the above scribed service and the documentation accurately describes the services I performed. I attest to the accuracy of the note.     Attending:   Physician Attestation Statement for Scribe #1: I, Otto Moreno MD, personally performed the services described in this documentation, as scribed by Caitlyn Espinosa, in my presence, and it is both accurate and complete.           Clinical Impression       ICD-10-CM ICD-9-CM   1. Dyspnea, unspecified type R06.00 786.09   2. Chest pain R07.9 786.50   3. Hypertension, unspecified type I10 401.9       Disposition:   Disposition: Discharged  Condition: Stable       Otto Moreno  MD  04/12/20 0058

## 2020-04-11 NOTE — TELEPHONE ENCOUNTER
Reason for Disposition   [1] Systolic BP  >= 160 OR Diastolic >= 100 AND [2] cardiac or neurologic symptoms (e.g., chest pain, difficulty breathing, unsteady gait, blurred vision)    Additional Information   Negative: Difficult to awaken or acting confused (e.g., disoriented, slurred speech)   Negative: Severe difficulty breathing (e.g., struggling for each breath, speaks in single words)   Negative: [1] Weakness of the face, arm or leg on one side of the body AND [2] new onset   Negative: [1] Numbness (i.e., loss of sensation) of the face, arm or leg on one side of the body AND [2] new onset   Negative: [1] Chest pain lasts > 5 minutes AND [2] history of heart disease  (i.e., heart attack, bypass surgery, angina, angioplasty, CHF)   Negative: [1] Chest pain AND [2] took nitrogylcerin AND [3] pain was not relieved   Negative: Sounds like a life-threatening emergency to the triager   Negative: Symptom is main concern  (e.g., headache, chest pain)   Negative: Low blood pressure is main concern    Protocols used: HIGH BLOOD PRESSURE-A-AH

## 2020-04-11 NOTE — TELEPHONE ENCOUNTER
Reason for Disposition   General information question, no triage required and triager able to answer question    Additional Information   Negative: [1] Caller is not with the adult (patient) AND [2] reporting urgent symptoms   Negative: Lab result questions   Negative: Medication questions   Negative: Caller can't be reached by phone   Negative: Caller has already spoken to PCP or another triager   Negative: RN needs further essential information from caller in order to complete triage   Negative: Requesting regular office appointment   Negative: [1] Caller requesting NON-URGENT health information AND [2] PCP's office is the best resource   Negative: Health Information question, no triage required and triager able to answer question    Protocols used: INFORMATION ONLY CALL-A-

## 2020-04-13 ENCOUNTER — NURSE TRIAGE (OUTPATIENT)
Dept: ADMINISTRATIVE | Facility: CLINIC | Age: 50
End: 2020-04-13

## 2020-04-13 RX ORDER — CYCLOBENZAPRINE HCL 10 MG
10 TABLET ORAL 3 TIMES DAILY PRN
Qty: 90 TABLET | Refills: 0 | Status: SHIPPED | OUTPATIENT
Start: 2020-04-13 | End: 2020-06-29 | Stop reason: SDUPTHER

## 2020-04-13 NOTE — TELEPHONE ENCOUNTER
Patient is requesting a refill of her Flexeril 10mg tablet to be sent to [facility]. Please advise.

## 2020-04-14 ENCOUNTER — PATIENT OUTREACH (OUTPATIENT)
Dept: ADMINISTRATIVE | Facility: OTHER | Age: 50
End: 2020-04-14

## 2020-04-14 ENCOUNTER — TELEPHONE (OUTPATIENT)
Dept: ENDOCRINOLOGY | Facility: CLINIC | Age: 50
End: 2020-04-14

## 2020-04-14 ENCOUNTER — TELEPHONE (OUTPATIENT)
Dept: CARDIOLOGY | Facility: CLINIC | Age: 50
End: 2020-04-14

## 2020-04-14 ENCOUNTER — OFFICE VISIT (OUTPATIENT)
Dept: ENDOCRINOLOGY | Facility: CLINIC | Age: 50
End: 2020-04-14
Payer: MEDICAID

## 2020-04-14 ENCOUNTER — OFFICE VISIT (OUTPATIENT)
Dept: CARDIOLOGY | Facility: CLINIC | Age: 50
End: 2020-04-14
Payer: MEDICAID

## 2020-04-14 VITALS — SYSTOLIC BLOOD PRESSURE: 155 MMHG | HEART RATE: 122 BPM | DIASTOLIC BLOOD PRESSURE: 101 MMHG

## 2020-04-14 DIAGNOSIS — I10 ESSENTIAL HYPERTENSION: Primary | Chronic | ICD-10-CM

## 2020-04-14 DIAGNOSIS — E55.9 VITAMIN D DEFICIENCY: Chronic | ICD-10-CM

## 2020-04-14 DIAGNOSIS — E78.1 HYPERTRIGLYCERIDEMIA: Chronic | ICD-10-CM

## 2020-04-14 DIAGNOSIS — Z79.4 TYPE 2 DIABETES MELLITUS WITH HYPERGLYCEMIA, WITH LONG-TERM CURRENT USE OF INSULIN: Chronic | ICD-10-CM

## 2020-04-14 DIAGNOSIS — E66.01 MORBID OBESITY WITH BMI OF 40.0-44.9, ADULT: Chronic | ICD-10-CM

## 2020-04-14 DIAGNOSIS — E11.65 TYPE 2 DIABETES MELLITUS WITH HYPERGLYCEMIA, WITH LONG-TERM CURRENT USE OF INSULIN: Chronic | ICD-10-CM

## 2020-04-14 DIAGNOSIS — G47.36 NOCTURNAL HYPOXEMIA DUE TO OBESITY: Chronic | ICD-10-CM

## 2020-04-14 DIAGNOSIS — G43.109 MIGRAINE WITH AURA AND WITHOUT STATUS MIGRAINOSUS, NOT INTRACTABLE: Chronic | ICD-10-CM

## 2020-04-14 DIAGNOSIS — E66.9 NOCTURNAL HYPOXEMIA DUE TO OBESITY: Chronic | ICD-10-CM

## 2020-04-14 DIAGNOSIS — Z12.31 ENCOUNTER FOR SCREENING MAMMOGRAM FOR MALIGNANT NEOPLASM OF BREAST: Primary | ICD-10-CM

## 2020-04-14 DIAGNOSIS — E66.2 OBESITY HYPOVENTILATION SYNDROME: ICD-10-CM

## 2020-04-14 DIAGNOSIS — I15.2 HYPERTENSION COMPLICATING DIABETES: Chronic | ICD-10-CM

## 2020-04-14 DIAGNOSIS — D50.8 IRON DEFICIENCY ANEMIA SECONDARY TO INADEQUATE DIETARY IRON INTAKE: ICD-10-CM

## 2020-04-14 DIAGNOSIS — F31.9 BIPOLAR 1 DISORDER: Chronic | ICD-10-CM

## 2020-04-14 DIAGNOSIS — I10 ESSENTIAL HYPERTENSION: ICD-10-CM

## 2020-04-14 DIAGNOSIS — J45.30 MILD PERSISTENT ASTHMA WITHOUT COMPLICATION: Chronic | ICD-10-CM

## 2020-04-14 DIAGNOSIS — F25.0 SCHIZOAFFECTIVE DISORDER, BIPOLAR TYPE: Chronic | ICD-10-CM

## 2020-04-14 DIAGNOSIS — E11.42 DIABETIC POLYNEUROPATHY ASSOCIATED WITH TYPE 2 DIABETES MELLITUS: Chronic | ICD-10-CM

## 2020-04-14 DIAGNOSIS — E11.59 HYPERTENSION COMPLICATING DIABETES: Chronic | ICD-10-CM

## 2020-04-14 DIAGNOSIS — R06.02 SHORTNESS OF BREATH: ICD-10-CM

## 2020-04-14 DIAGNOSIS — M79.7 FIBROMYALGIA: Chronic | ICD-10-CM

## 2020-04-14 DIAGNOSIS — R94.2 DIFFUSION CAPACITY OF LUNG (DL), DECREASED: Chronic | ICD-10-CM

## 2020-04-14 PROCEDURE — 99213 OFFICE O/P EST LOW 20 MIN: CPT | Mod: 95,,, | Performed by: INTERNAL MEDICINE

## 2020-04-14 PROCEDURE — 99213 PR OFFICE/OUTPT VISIT, EST, LEVL III, 20-29 MIN: ICD-10-PCS | Mod: 95,,, | Performed by: INTERNAL MEDICINE

## 2020-04-14 RX ORDER — INSULIN GLARGINE 300 U/ML
INJECTION, SOLUTION SUBCUTANEOUS
Qty: 15 ML | Refills: 3 | Status: SHIPPED | OUTPATIENT
Start: 2020-04-14 | End: 2020-08-12 | Stop reason: SDUPTHER

## 2020-04-14 NOTE — PROGRESS NOTES
Chart reviewed.   Requested updates from Care Everywhere.  Immunizations reconciled.    updated.  Mammogram ordered

## 2020-04-14 NOTE — PROGRESS NOTES
Subjective:   Patient ID:  Yolanda Betts is a 49 y.o. female who presents for follow up of No chief complaint on file.      HPI  The patient location is: HOME  The chief complaint leading to consultation is: CHEST PAIN UNCONTROLLED HTN  Visit type: audiovisual  Total time spent with patient: 13 MINUTES  Each patient to whom he or she provides medical services by telemedicine is:  (1) informed of the relationship between the physician and patient and the respective role of any other health care provider with respect to management of the patient; and (2) notified that he or she may decline to receive medical services by telemedicine and may withdraw from such care at any time.    Notes:  A 50 YO FEMALE WITH OBESITY DIABETES HTN NOCTURNAL DESATURATION ASTHMA SCHIZOAFFECTIVE DISORDER IS FOLLOWING UP FOR CHEST PAIN TIGHTNESS SHORTNESS OF BREATH AFTER 2 ER VISITS WITH NEGATIVE COVID. HER HR WAS ELEVATED 122/MIN HER HTN IS UNCONTROLLED SHE CLAIMS COMPLIANCE WITH SALT AND DIABETES WHICH IS OUT OF CONTROL. HAVE SOME DIARRHEA AND COMNSTIPATION. HAS INCREASED APPETITE AT NITE . HAS NO LEG SWELLING ORTHOPNEA PND. SYNCOPE NEAR SYNCOPE PALPITATION.  Past Medical History:   Diagnosis Date    Abnormal Pap smear of cervix     HPV genital warts    Anemia     Anxiety     Arthritis     Asthma 10/11/2016    Bipolar 1 disorder     Diabetes mellitus, type 2     Dyslipidemia associated with type 2 diabetes mellitus 5/13/2019    Genital warts     GERD (gastroesophageal reflux disease)     Herpes simplex virus (HSV) infection     Hypertension     Hypertension complicating diabetes 5/5/2019    Migraine with aura and without status migrainosus, not intractable 3/21/2016    Mild persistent asthma without complication 10/11/2016    Morbid obesity with body mass index (BMI) of 45.0 to 49.9 in adult 8/3/2017    Obstructive sleep apnea     ALEN (obstructive sleep apnea)     2L per N/C q HS    Schizoaffective disorder,  "bipolar type 2019    Seasonal allergic rhinitis due to pollen 2019    Type 2 diabetes mellitus with hyperglycemia, with long-term current use of insulin 2017    Type 2 diabetes mellitus with hyperglycemia, without long-term current use of insulin 2017       Past Surgical History:   Procedure Laterality Date    abcess removed right back      BELT ABDOMINOPLASTY      BREAST SURGERY Bilateral 1996    Reduction     SECTION      X 2    COLONOSCOPY      COLONOSCOPY N/A 2018    Procedure: colonoscopy for iron deficiency anemia;  Surgeon: Frankie Sanchez MD;  Location: [facility] ENDO;  Service: Endoscopy;  Laterality: N/A;    COLONOSCOPY N/A 2018    Procedure: COLONOSCOPY;  Surgeon: Frankie Sanchez MD;  Location: [facility] ENDO;  Service: Endoscopy;  Laterality: N/A;    HYSTERECTOMY      w/ BSO; hypermenorrhea    MOUTH SURGERY      OOPHORECTOMY      hyst/bso, hypermenorrhea    ROBOT-ASSISTED CHOLECYSTECTOMY USING DA DARI XI N/A 1/3/2020    Procedure: XI ROBOTIC CHOLECYSTECTOMY;  Surgeon: Damir Driscoll MD;  Location: [facility] OR;  Service: General;  Laterality: N/A;    TUBAL LIGATION         Social History     Tobacco Use    Smoking status: Never Smoker    Smokeless tobacco: Never Used   Substance Use Topics    Alcohol use: Yes     Alcohol/week: 0.0 standard drinks     Comment: socially  No alcohol 72h prior to sx    Drug use: No       Family History   Problem Relation Age of Onset    Diabetes Mother     Hyperlipidemia Mother     Hypertension Mother     Asthma Mother     COPD Mother     Glaucoma Mother     Thyroid disease Mother     Anesthesia problems Mother         "almost had a cardiac arrest" , blood clots    Hypertension Father     Hyperlipidemia Father     Cancer Father         Brain, lung, liver, kidney    Heart disease Maternal Grandmother     Hyperlipidemia Maternal Grandmother     Hypertension Maternal Grandmother     Cataracts " "Maternal Grandmother     Diabetes Maternal Grandmother     Heart disease Maternal Grandfather     Hyperlipidemia Maternal Grandfather     Hypertension Maternal Grandfather     Glaucoma Maternal Grandfather     Cancer Maternal Grandfather     Cataracts Maternal Grandfather     Macular degeneration Maternal Grandfather     Diabetes Maternal Grandfather     Heart disease Paternal Grandmother     Hyperlipidemia Paternal Grandmother     Hypertension Paternal Grandmother     Cataracts Paternal Grandmother     Heart disease Paternal Grandfather     Hyperlipidemia Paternal Grandfather     Hypertension Paternal Grandfather     Cataracts Paternal Grandfather     Breast cancer Maternal Cousin     Breast cancer Maternal Cousin     Breast cancer Maternal Cousin     Breast cancer Maternal Cousin        Current Outpatient Medications   Medication Sig    ALPRAZolam (XANAX) 2 MG Tab Take 1 tablet by mouth twice daily    ALPRAZolam (XANAX) 2 MG Tab Take 1 tablet (2 mg total) by mouth 2 (two) times daily.    amLODIPine (NORVASC) 10 MG tablet Take 1 tablet (10 mg total) by mouth once daily.    ARIPiprazole (ABILIFY) 10 MG Tab Take 1 tablet by mouth every day    aspirin (ECOTRIN) 81 MG EC tablet Take 81 mg by mouth once daily.    atorvastatin (LIPITOR) 20 MG tablet Take 1 tablet (20 mg total) by mouth every evening.    BD INSULIN SYRINGE ULTRA-FINE 1/2 mL 30 gauge x 1/2" Syrg     benztropine (COGENTIN) 1 MG tablet take one tablet by mouth at bedtime    benztropine (COGENTIN) 1 MG tablet Take 1 tablet by mouth at bedtime    benztropine (COGENTIN) 1 MG tablet Take 1 tablet (1 mg total) by mouth every evening.    blood sugar diagnostic Strp Check blood glucose 4 times daily as directed and as needed (dispense insurance preferred brand or patient choice)    blood-glucose meter Misc Use to check blood sugars, give meter based on insurance specification    chlorproMAZINE (THORAZINE) 50 MG tablet Take 1 tablet " (50 mg total) by mouth at bedtime    cyclobenzaprine (FLEXERIL) 10 MG tablet Take 1 tablet (10 mg total) by mouth 3 (three) times daily as needed for Muscle spasms.    cycloSPORINE (RESTASIS) 0.05 % ophthalmic emulsion Place 0.4 mLs (1 drop total) into both eyes 2 (two) times daily.    DULoxetine (CYMBALTA) 60 MG capsule Take 1 capsule (60 mg total) by mouth 2 (two) times daily.    DULoxetine (CYMBALTA) 60 MG capsule Take 1 capsule by mouth twice daily    DULoxetine (CYMBALTA) 60 MG capsule Take 1 capsule (60 mg total) by mouth 2 (two) times daily.    ergocalciferol (ERGOCALCIFEROL) 50,000 unit Cap Take 1 capsule (50,000 Units total) by mouth every 14 (fourteen) days.    fish oil-omega-3 fatty acids 300-1,000 mg capsule Take 1 capsule by mouth once daily.    fluticasone propionate (FLONASE) 50 mcg/actuation nasal spray 2 sprays (100 mcg total) by Each Nare route once daily. (Patient taking differently: 2 sprays by Each Nostril route daily as needed. )    furosemide (LASIX) 20 MG tablet Take 1 tablet (20 mg total) by mouth once daily.    gabapentin (NEURONTIN) 300 MG capsule Take 2 capsules (600 mg total) by mouth 3 (three) times daily.    hydrALAZINE (APRESOLINE) 10 MG tablet Take 1 tablet (10 mg total) by mouth every 12 (twelve) hours.    lancets 28 gauge Misc use as directed 3 times daily    linaCLOtide (LINZESS) 145 mcg Cap capsule Take 1 capsule (145 mcg total) by mouth daily as needed (for constipation).    liraglutide 0.6 mg/0.1 mL, 18 mg/3 mL, subq PNIJ (VICTOZA 2-MILENA) 0.6 mg/0.1 mL (18 mg/3 mL) PnIj Use 1.8mg under the skin daily (Patient taking differently: Inject 1.8 mg into the skin once daily. Use 1.8mg under the skin daily)    lurasidone (LATUDA) 120 mg Tab Take 1 tablet by mouth at 5 pm with food (Patient taking differently: Take 120 mg by mouth nightly. )    lurasidone (LATUDA) 120 mg Tab Take 1 tablet by mouth at 5 p.m. with food    lurasidone 120 mg Tab Take one tablet by mouth  "daily at 5:00pm with food    lurasidone 120 mg Tab Take 1 tablet (120 mg total) by mouth once daily at 5 pm.    magnesium oxide (MAG-OX) 400 mg (241.3 mg magnesium) tablet Take 1 tablet (400 mg total) by mouth once daily.    meclizine (ANTIVERT) 25 mg tablet Take 1 tablet by mouth every day as needed until directed to stop.    metFORMIN (GLUCOPHAGE) 1000 MG tablet TAKE 1 TABLET (1,000 MG TOTAL) BY MOUTH 2 (TWO) TIMES DAILY.    metoprolol succinate (TOPROL-XL) 100 MG 24 hr tablet Take 2 tablets (200 mg total) by mouth every evening.    mirtazapine (REMERON) 45 MG tablet Take 1 tablet by mouth at bedtime    mirtazapine (REMERON) 45 MG tablet Take 1 tablet (45 mg total) by mouth every evening.    mometasone (ASMANEX TWISTHALER) 220 mcg (120 doses) AePB Inhale 2 puffs into the lungs 2 (two) times daily. Controller    MULTIVIT,CALC,MINS/IRON/FOLIC (DAILY MULTIPLE FOR WOMEN ORAL) Take 1 tablet by mouth once daily.    pen needle, diabetic (BD ULTRA-FINE MINI PEN NEEDLE) 31 gauge x 3/16" Ndle Use 5 a day    spironolactone (ALDACTONE) 25 MG tablet Take 1 tablet (25 mg total) by mouth once daily.    topiramate (TOPAMAX) 50 MG tablet Take 1 tablet (50 mg total) by mouth 2 (two) times daily.    TOUJEO MAX U-300 SOLOSTAR 300 unit/mL (3 mL) InPn Inject 90 units once daily at bedtime    TOUJEO SOLOSTAR U-300 INSULIN 300 unit/mL (1.5 mL) InPn pen Inject 80 Units into the skin every evening. Needs 9 pens for 1 month so pharmacy please give adequate amount  and call office for questions    valACYclovir (VALTREX) 1000 MG tablet Take 1 tablet (1,000 mg total) by mouth once daily.    valsartan (DIOVAN) 320 MG tablet Take 1 tablet (320 mg total) by mouth once daily.    insulin aspart U-100 (NOVOLOG FLEXPEN U-100 INSULIN) 100 unit/mL (3 mL) InPn pen Inject before meals three times a day up to 30 units daily (Patient taking differently: Inject into the skin 3 (three) times daily with meals. Inject before meals three times " "a day up to 30 units daily)    triazolam (HALCION) 0.25 MG Tab Take 1 tablet by mouth at bedtime (Patient not taking: Reported on 4/14/2020)     No current facility-administered medications for this visit.      Current Outpatient Medications on File Prior to Visit   Medication Sig    ALPRAZolam (XANAX) 2 MG Tab Take 1 tablet by mouth twice daily    ALPRAZolam (XANAX) 2 MG Tab Take 1 tablet (2 mg total) by mouth 2 (two) times daily.    amLODIPine (NORVASC) 10 MG tablet Take 1 tablet (10 mg total) by mouth once daily.    ARIPiprazole (ABILIFY) 10 MG Tab Take 1 tablet by mouth every day    aspirin (ECOTRIN) 81 MG EC tablet Take 81 mg by mouth once daily.    atorvastatin (LIPITOR) 20 MG tablet Take 1 tablet (20 mg total) by mouth every evening.    BD INSULIN SYRINGE ULTRA-FINE 1/2 mL 30 gauge x 1/2" Syrg     benztropine (COGENTIN) 1 MG tablet take one tablet by mouth at bedtime    benztropine (COGENTIN) 1 MG tablet Take 1 tablet by mouth at bedtime    benztropine (COGENTIN) 1 MG tablet Take 1 tablet (1 mg total) by mouth every evening.    blood sugar diagnostic Strp Check blood glucose 4 times daily as directed and as needed (dispense insurance preferred brand or patient choice)    blood-glucose meter Misc Use to check blood sugars, give meter based on insurance specification    chlorproMAZINE (THORAZINE) 50 MG tablet Take 1 tablet (50 mg total) by mouth at bedtime    cyclobenzaprine (FLEXERIL) 10 MG tablet Take 1 tablet (10 mg total) by mouth 3 (three) times daily as needed for Muscle spasms.    cycloSPORINE (RESTASIS) 0.05 % ophthalmic emulsion Place 0.4 mLs (1 drop total) into both eyes 2 (two) times daily.    DULoxetine (CYMBALTA) 60 MG capsule Take 1 capsule (60 mg total) by mouth 2 (two) times daily.    DULoxetine (CYMBALTA) 60 MG capsule Take 1 capsule by mouth twice daily    DULoxetine (CYMBALTA) 60 MG capsule Take 1 capsule (60 mg total) by mouth 2 (two) times daily.    ergocalciferol " (ERGOCALCIFEROL) 50,000 unit Cap Take 1 capsule (50,000 Units total) by mouth every 14 (fourteen) days.    fish oil-omega-3 fatty acids 300-1,000 mg capsule Take 1 capsule by mouth once daily.    fluticasone propionate (FLONASE) 50 mcg/actuation nasal spray 2 sprays (100 mcg total) by Each Nare route once daily. (Patient taking differently: 2 sprays by Each Nostril route daily as needed. )    furosemide (LASIX) 20 MG tablet Take 1 tablet (20 mg total) by mouth once daily.    gabapentin (NEURONTIN) 300 MG capsule Take 2 capsules (600 mg total) by mouth 3 (three) times daily.    hydrALAZINE (APRESOLINE) 10 MG tablet Take 1 tablet (10 mg total) by mouth every 12 (twelve) hours.    lancets 28 gauge Misc use as directed 3 times daily    linaCLOtide (LINZESS) 145 mcg Cap capsule Take 1 capsule (145 mcg total) by mouth daily as needed (for constipation).    liraglutide 0.6 mg/0.1 mL, 18 mg/3 mL, subq PNIJ (VICTOZA 2-MILENA) 0.6 mg/0.1 mL (18 mg/3 mL) PnIj Use 1.8mg under the skin daily (Patient taking differently: Inject 1.8 mg into the skin once daily. Use 1.8mg under the skin daily)    lurasidone (LATUDA) 120 mg Tab Take 1 tablet by mouth at 5 pm with food (Patient taking differently: Take 120 mg by mouth nightly. )    lurasidone (LATUDA) 120 mg Tab Take 1 tablet by mouth at 5 p.m. with food    lurasidone 120 mg Tab Take one tablet by mouth daily at 5:00pm with food    lurasidone 120 mg Tab Take 1 tablet (120 mg total) by mouth once daily at 5 pm.    magnesium oxide (MAG-OX) 400 mg (241.3 mg magnesium) tablet Take 1 tablet (400 mg total) by mouth once daily.    meclizine (ANTIVERT) 25 mg tablet Take 1 tablet by mouth every day as needed until directed to stop.    metFORMIN (GLUCOPHAGE) 1000 MG tablet TAKE 1 TABLET (1,000 MG TOTAL) BY MOUTH 2 (TWO) TIMES DAILY.    metoprolol succinate (TOPROL-XL) 100 MG 24 hr tablet Take 2 tablets (200 mg total) by mouth every evening.    mirtazapine (REMERON) 45 MG tablet  "Take 1 tablet by mouth at bedtime    mirtazapine (REMERON) 45 MG tablet Take 1 tablet (45 mg total) by mouth every evening.    mometasone (ASMANEX TWISTHALER) 220 mcg (120 doses) AePB Inhale 2 puffs into the lungs 2 (two) times daily. Controller    MULTIVIT,CALC,MINS/IRON/FOLIC (DAILY MULTIPLE FOR WOMEN ORAL) Take 1 tablet by mouth once daily.    pen needle, diabetic (BD ULTRA-FINE MINI PEN NEEDLE) 31 gauge x 3/16" Ndle Use 5 a day    spironolactone (ALDACTONE) 25 MG tablet Take 1 tablet (25 mg total) by mouth once daily.    topiramate (TOPAMAX) 50 MG tablet Take 1 tablet (50 mg total) by mouth 2 (two) times daily.    TOUJEO MAX U-300 SOLOSTAR 300 unit/mL (3 mL) InPn Inject 90 units once daily at bedtime    TOUJEO SOLOSTAR U-300 INSULIN 300 unit/mL (1.5 mL) InPn pen Inject 80 Units into the skin every evening. Needs 9 pens for 1 month so pharmacy please give adequate amount  and call office for questions    valACYclovir (VALTREX) 1000 MG tablet Take 1 tablet (1,000 mg total) by mouth once daily.    valsartan (DIOVAN) 320 MG tablet Take 1 tablet (320 mg total) by mouth once daily.    [] diphenoxylate-atropine 2.5-0.025 mg (LOMOTIL) 2.5-0.025 mg per tablet Take 1 tablet by mouth 4 (four) times daily as needed for Diarrhea.    insulin aspart U-100 (NOVOLOG FLEXPEN U-100 INSULIN) 100 unit/mL (3 mL) InPn pen Inject before meals three times a day up to 30 units daily (Patient taking differently: Inject into the skin 3 (three) times daily with meals. Inject before meals three times a day up to 30 units daily)    triazolam (HALCION) 0.25 MG Tab Take 1 tablet by mouth at bedtime (Patient not taking: Reported on 2020)     No current facility-administered medications on file prior to visit.      Review of patient's allergies indicates:   Allergen Reactions    Codeine Itching    Neuromuscular blockers, steroidal Hives     some    Latex, natural rubber Rash    Morphine Rash     itching    Norco " [hydrocodone-acetaminophen] Itching, Rash and Hallucinations    Seconal [secobarbital sodium] Rash     itching    Tylox [oxycodone-acetaminophen] Rash     Review of Systems   Constitution: Negative for diaphoresis, malaise/fatigue and weight gain.   HENT: Negative for hoarse voice.    Eyes: Negative for blurred vision, double vision and visual disturbance.   Cardiovascular: Positive for chest pain and dyspnea on exertion. Negative for claudication, cyanosis, irregular heartbeat, leg swelling, near-syncope, orthopnea, palpitations, paroxysmal nocturnal dyspnea and syncope.   Respiratory: Positive for shortness of breath. Negative for cough, hemoptysis and snoring.    Hematologic/Lymphatic: Negative for bleeding problem. Does not bruise/bleed easily.   Skin: Negative for color change, dry skin, itching and poor wound healing.   Musculoskeletal: Negative for falls, muscle cramps, muscle weakness and myalgias.   Gastrointestinal: Negative for bloating, abdominal pain, change in bowel habit, diarrhea, heartburn, hematemesis, hematochezia, melena and nausea.   Genitourinary: Negative for flank pain and hematuria.   Neurological: Negative for excessive daytime sleepiness, dizziness, focal weakness, headaches, light-headedness, loss of balance, numbness, paresthesias, seizures and weakness.   Psychiatric/Behavioral: Negative for altered mental status and memory loss. The patient does not have insomnia and is not nervous/anxious.    Allergic/Immunologic: Negative for hives.       Objective:   Physical Exam  Vitals:    04/14/20 1405   BP: (!) 155/101   Pulse: (!) 122     Lab Results   Component Value Date    CHOL 126 02/18/2020    CHOL 142 04/25/2019    CHOL 102 (L) 12/22/2017     Lab Results   Component Value Date    HDL 46 02/18/2020    HDL 36 (L) 04/25/2019    HDL 43 12/22/2017     Lab Results   Component Value Date    LDLCALC 49.6 (L) 02/18/2020    LDLCALC 40.4 (L) 04/25/2019    LDLCALC 33.6 (L) 12/22/2017     Lab  Results   Component Value Date    TRIG 152 (H) 02/18/2020    TRIG 328 (H) 04/25/2019    TRIG 127 12/22/2017     Lab Results   Component Value Date    CHOLHDL 36.5 02/18/2020    CHOLHDL 25.4 04/25/2019    CHOLHDL 42.2 12/22/2017       Chemistry        Component Value Date/Time     (L) 04/11/2020 1925    K 4.4 04/11/2020 1925    CL 97 04/11/2020 1925    CO2 24 04/11/2020 1925    BUN 11 04/11/2020 1925    CREATININE 0.7 04/11/2020 1925     (H) 04/11/2020 1925        Component Value Date/Time    CALCIUM 9.6 04/11/2020 1925    ALKPHOS 90 04/11/2020 1925    AST 20 04/11/2020 1925    ALT 30 04/11/2020 1925    BILITOT 0.2 04/11/2020 1925    ESTGFRAFRICA >60.0 04/11/2020 1925    EGFRNONAA >60.0 04/11/2020 1925        Lab Results   Component Value Date    HGBA1C 7.1 (H) 02/18/2020       Lab Results   Component Value Date    TSH 1.855 12/22/2017     Lab Results   Component Value Date    INR 0.9 11/23/2016     Lab Results   Component Value Date    WBC 10.59 04/11/2020    HGB 11.9 (L) 04/11/2020    HCT 38.5 04/11/2020    MCV 91 04/11/2020     04/11/2020     BMP  Sodium   Date Value Ref Range Status   04/11/2020 132 (L) 136 - 145 mmol/L Final     Potassium   Date Value Ref Range Status   04/11/2020 4.4 3.5 - 5.1 mmol/L Final     Chloride   Date Value Ref Range Status   04/11/2020 97 95 - 110 mmol/L Final     CO2   Date Value Ref Range Status   04/11/2020 24 23 - 29 mmol/L Final     BUN, Bld   Date Value Ref Range Status   04/11/2020 11 6 - 20 mg/dL Final     Creatinine   Date Value Ref Range Status   04/11/2020 0.7 0.5 - 1.4 mg/dL Final     Calcium   Date Value Ref Range Status   04/11/2020 9.6 8.7 - 10.5 mg/dL Final     Anion Gap   Date Value Ref Range Status   04/11/2020 11 8 - 16 mmol/L Final     eGFR if    Date Value Ref Range Status   04/11/2020 >60.0 >60 mL/min/1.73 m^2 Final     eGFR if non    Date Value Ref Range Status   04/11/2020 >60.0 >60 mL/min/1.73 m^2 Final      Comment:     Calculation used to obtain the estimated glomerular filtration  rate (eGFR) is the CKD-EPI equation.        CrCl cannot be calculated (Unknown ideal weight.).    Assessment:     1. Essential hypertension    2. Migraine with aura and without status migrainosus, not intractable    3. Diabetic polyneuropathy associated with type 2 diabetes mellitus    4. Mild persistent asthma without complication    5. Bipolar 1 disorder    6. Diffusion capacity of lung (dl), decreased    7. Hypertriglyceridemia    8. Type 2 diabetes mellitus with hyperglycemia, with long-term current use of insulin    9. Schizoaffective disorder, bipolar type    10. Vitamin D deficiency    11. Fibromyalgia    12. Hypertension complicating diabetes    13. Nocturnal hypoxemia due to obesity - WITHOUT ALEN    14. Morbid obesity with BMI of 40.0-44.9, adult    15. Obesity hypoventilation syndrome    16. Iron deficiency anemia secondary to inadequate dietary iron intake      HAS SYMPTOMATIC UNCONTROLLED HTN. REVIEWED ER VISTS AND W/U. SHE CLAIMS COMPLIANCE. IF THIS SI THE CASE WILL INCREASE AMLODIPINE TO 10 MG IN AM AND 5 MG IN THE EVENING AND ASSESS RESPONSE IN 1 WEEK. SHE WAS COUNSELED ABOUT COMPLIANCE WITH MEDS DIET.  LIPIDS REMARKABLY IMPROVED.   Plan:   AS PER ABOVE   F/U IN 1 WEEK TO ASSESS.

## 2020-04-14 NOTE — TELEPHONE ENCOUNTER
Patient reports blood glucose of 312 with a blood pressure of 155/101 and heart rate of 122. She also reports difficulty breathing w/ HA. Per protocol she was advised to go to the ED for further evaluation. She refuses disposition stating she cannot go in tonight. She reports she was recently seen and evaluated for the same symptoms. She will call back with further questions/concerns or changes.    Reason for Disposition   [1] Systolic BP  >= 160 OR Diastolic >= 100 AND [2] cardiac or neurologic symptoms (e.g., chest pain, difficulty breathing, unsteady gait, blurred vision)   [1] Blood glucose > 240 mg/dl (13 mmol/l) AND [2] rapid breathing    Additional Information   Negative: Difficult to awaken or acting confused (e.g., disoriented, slurred speech)   Negative: Severe difficulty breathing (e.g., struggling for each breath, speaks in single words)   Negative: [1] Numbness (i.e., loss of sensation) of the face, arm or leg on one side of the body AND [2] new onset   Negative: [1] Weakness of the face, arm or leg on one side of the body AND [2] new onset   Negative: [1] Chest pain lasts > 5 minutes AND [2] history of heart disease  (i.e., heart attack, bypass surgery, angina, angioplasty, CHF)   Negative: [1] Chest pain AND [2] took nitrogylcerin AND [3] pain was not relieved   Negative: Sounds like a life-threatening emergency to the triager   Negative: Unconscious or difficult to awaken   Negative: Acting confused (e.g., disoriented, slurred speech)   Negative: Very weak (e.g., can't stand)   Negative: Sounds like a life-threatening emergency to the triager   Negative: [1] Vomiting AND [2] signs of dehydration (e.g., very dry mouth, lightheaded, etc.)    Protocols used: HIGH BLOOD PRESSURE-A-, DIABETES - HIGH BLOOD SUGAR-A-AH

## 2020-04-14 NOTE — TELEPHONE ENCOUNTER
Returned patient's call.  Patient has been having high BP readings at home and SOB for several days. Patient went to ER on 4/11/20 and is being ruled out for COVID-19. Patient would like to schedule a virtual visit to discuss HTN today.  Virtual appointment scheduled for 2 PM today with Dr. Mccormick. Pt confirmed appt.    ----- Message from Marcia Jin sent at 4/14/2020 12:08 PM CDT -----  Contact: Patient   Would like to consult with nurse on scheduling a Virtual visit. Please callback a [phone]   Thanks.

## 2020-04-14 NOTE — TELEPHONE ENCOUNTER
Rescheduling appointment    ----- Message from Sis Harvey sent at 4/14/2020  9:03 AM CDT -----  Contact: Pt   Pt called and stated she was returning a call to the nurse. She can be reached at [phone].    Thanks,  TF

## 2020-04-14 NOTE — PROGRESS NOTES
Patient ID: Yolanda Betts is a 49 y.o. female.  The patient location is: home  The chief complaint leading to consultation is:  Diabetes  Visit type: audiovisual  Total time spent with patient:  15 min  Each patient to whom he or she provides medical services by telemedicine is:  (1) informed of the relationship between the physician and patient and the respective role of any other health care provider with respect to management of the patient; and (2) notified that he or she may decline to receive medical services by telemedicine and may withdraw from such care at any time.    Notes:  See below        Chief Complaint: No chief complaint on file.      HPI    Yolanda Betts is here for follow-up  of type 2 Diabetes Mellitus    Consultation requested by Dr. Ermias Mccormick    PCP: CHARLA Beach MD      Diagnosed:  Around 2000 and has been on insulin several years.  Had better control earlier on around her diagnosis  Saw Millie Pruitt 2/2018 saw endocrinologist once in 2016, Dr. Mejai    She had a normal C-peptide April 2018 4.56      Past Medical History:   Diagnosis Date    Anemia     Anxiety     Arthritis     Asthma 10/11/2016    Bipolar 1 disorder     Diabetes mellitus, type 2     Dyslipidemia associated with type 2 diabetes mellitus 5/13/2019    GERD (gastroesophageal reflux disease)     Hypertension     Hypertension complicating diabetes 5/5/2019    Mild persistent asthma without complication 10/11/2016    Morbid obesity with body mass index (BMI) of 45.0 to 49.9 in adult 8/3/2017    Obstructive sleep apnea     Schizoaffective disorder, bipolar type 4/25/2019    Seasonal allergic rhinitis due to pollen 5/13/2019    Type 2 diabetes mellitus with hyperglycemia, without long-term current use of insulin 12/21/2017     Hemoglobin A1C   Date Value Ref Range Status   06/24/2019 7.7 (H) 4.0 - 5.6 % Final     Comment:     ADA Screening Guidelines:  5.7-6.4%  Consistent with  prediabetes  >or=6.5%  Consistent with diabetes  High levels of fetal hemoglobin interfere with the HbA1C  assay. Heterozygous hemoglobin variants (HbS, HgC, etc)do  not significantly interfere with this assay.   However, presence of multiple variants may affect accuracy.       Lab Results   Component Value Date    HGBA1C 8.3 (H) 12/22/2017    HGBA1C 8.5 (H) 01/31/2017    HGBA1C 6.6 (H) 03/22/2016       Diabetes medications include: Toujeo   80 qhs  Novolog 15 tid  Metformin  1000mg bid and Victoza 1.8 mg a day and in spite of telling her to stop the Jardiance 10mg daily at last visit she is still on this and I added NovoLog at meals last visit and she is taking at least 10 units with meals    Tried glyburide in the past  Previously was on Levemir  Because of continued symptoms ofShe also developed an abscess on the right flank that has been irrigated and debrided twice.  Still has abdominal distention and complains of pelvic discomfort as well as discomfort in her flank area; her gyn did order the CT of the abdomen and this was denied by her insurance; her liver function tests on her last evaluation in October 2019 were elevated; she denies any alcohol use.  I ordered an ultrasound that showed fatty liver      She is being followed by Cardiology for history of tachycardia and an echocardiogram and Holter were ordered and also has hypertension    History of obstructive sleep apnea but not using CPAP    Diabetes Complications:peripheral neuropathy  Toes and fingers have numbness and Cymbalta helps she says  Hypoglycemia: NO      Meal Planning:  She does admit recently her diet has been poor.barry Lopez water for lunch    Lab Results   Component Value Date    POCGLU 269 (A) 11/11/2019       Diabetes education: YES:  2019 has started seeing diabetes educator    SMBG: Tests BG four times a day    Log or meter available:  Verbal report  Home values if available:  FBG:  Lately fasting sugars have been running high  in the 200s mostly  Pre-lunch:  Pre-dinner:  Bedtime:  Post Breakfast:  Post Lunch  Post Dinner  Ranges:  Sugars have been consistently in the 200s for the last 4 weeks    Exercise: No     STANDARDS of CARE:        ACE inhibitor or angiotensin II receptor blocker:  Yes        Statin drug:  Yes        Eye exam within last year: Yes        Influenza vaccine up to date: Yes        Pneumonia vaccine:yes         No family history of medullary thyroid cancer and no history of pancreatitis      Interval history:  Was recently seen in the emergency room with complaints of shortness of breath and elevated blood pressure, she also reports chest tightness in his waiting for call from cardiology.  She was tested for covid 19 and test was negative, she had previously also reported body aches as well as sore throat and diarrhea.  And as mentioned above her sugars the last 4 weeks have been in the 200s, her A1c actually had gone down in February to 7.1 from 9.9    She reports 1 of her medications was discontinued for fear that it was causing her to have increased appetite where she said she would eat a lot especially overnight  I have reviewed the past medical, family and social history    Review of Systems   Constitutional: Negative for appetite change, fatigue, fever and unexpected weight change.   HENT: Negative for sore throat and trouble swallowing.    Eyes: Negative for visual disturbance.   Respiratory: Positive for chest tightness and shortness of breath. Negative for wheezing.    Cardiovascular: Negative for chest pain, palpitations and leg swelling.   Gastrointestinal: Negative for abdominal distention, diarrhea, nausea and vomiting.   Endocrine: Negative for cold intolerance, heat intolerance, polydipsia, polyphagia and polyuria.   Genitourinary: Negative for difficulty urinating, dysuria, menstrual problem and pelvic pain.   Musculoskeletal: Negative for arthralgias and joint swelling.   Skin: Negative for rash.    Neurological: Negative for dizziness, weakness, numbness and headaches.   Psychiatric/Behavioral: Negative for confusion, dysphoric mood and sleep disturbance.       Objective:      Physical Exam   Constitutional: No distress.   Neurological: She is alert.   Psychiatric: She has a normal mood and affect.         Lab Review:   Admission on 04/11/2020, Discharged on 04/11/2020   Component Date Value    HIV 1/2 Ag/Ab 04/11/2020 Negative     WBC 04/11/2020 10.59     RBC 04/11/2020 4.22     Hemoglobin 04/11/2020 11.9*    Hematocrit 04/11/2020 38.5     Mean Corpuscular Volume 04/11/2020 91     Mean Corpuscular Hemoglo* 04/11/2020 28.2     Mean Corpuscular Hemoglo* 04/11/2020 30.9*    RDW 04/11/2020 15.9*    Platelets 04/11/2020 303     MPV 04/11/2020 10.0     Immature Granulocytes 04/11/2020 0.6*    Gran # (ANC) 04/11/2020 5.9     Immature Grans (Abs) 04/11/2020 0.06*    Lymph # 04/11/2020 3.9     Mono # 04/11/2020 0.5     Eos # 04/11/2020 0.2     Baso # 04/11/2020 0.07     nRBC 04/11/2020 0     Gran% 04/11/2020 56.0     Lymph% 04/11/2020 36.4     Mono% 04/11/2020 4.5     Eosinophil% 04/11/2020 1.8     Basophil% 04/11/2020 0.7     Differential Method 04/11/2020 Automated     Troponin I 04/11/2020 <0.006     BNP 04/11/2020 <10     Sodium 04/11/2020 132*    Potassium 04/11/2020 4.4     Chloride 04/11/2020 97     CO2 04/11/2020 24     Glucose 04/11/2020 294*    BUN, Bld 04/11/2020 11     Creatinine 04/11/2020 0.7     Calcium 04/11/2020 9.6     Total Protein 04/11/2020 7.5     Albumin 04/11/2020 3.9     Total Bilirubin 04/11/2020 0.2     Alkaline Phosphatase 04/11/2020 90     AST 04/11/2020 20     ALT 04/11/2020 30     Anion Gap 04/11/2020 11     eGFR if African American 04/11/2020 >60.0     eGFR if non African Amer* 04/11/2020 >60.0     Troponin I 04/11/2020 0.006    Appointment on 04/07/2020   Component Date Value    SARS-CoV2 (COVID-19) Kishan* 04/07/2020 Not Detected     Admission on 04/03/2020, Discharged on 04/03/2020   Component Date Value    WBC 04/03/2020 8.33     RBC 04/03/2020 4.13     Hemoglobin 04/03/2020 11.4*    Hematocrit 04/03/2020 37.8     Mean Corpuscular Volume 04/03/2020 92     Mean Corpuscular Hemoglo* 04/03/2020 27.6     Mean Corpuscular Hemoglo* 04/03/2020 30.2*    RDW 04/03/2020 15.8*    Platelets 04/03/2020 309     MPV 04/03/2020 9.6     Immature Granulocytes 04/03/2020 1.0*    Gran # (ANC) 04/03/2020 4.2     Immature Grans (Abs) 04/03/2020 0.08*    Lymph # 04/03/2020 3.4     Mono # 04/03/2020 0.4     Eos # 04/03/2020 0.2     Baso # 04/03/2020 0.06     nRBC 04/03/2020 0     Gran% 04/03/2020 50.4     Lymph% 04/03/2020 40.8     Mono% 04/03/2020 4.7     Eosinophil% 04/03/2020 2.4     Basophil% 04/03/2020 0.7     Differential Method 04/03/2020 Automated     Sodium 04/03/2020 137     Potassium 04/03/2020 4.8     Chloride 04/03/2020 100     CO2 04/03/2020 25     Glucose 04/03/2020 229*    BUN, Bld 04/03/2020 15     Creatinine 04/03/2020 0.7     Calcium 04/03/2020 9.7     Total Protein 04/03/2020 7.5     Albumin 04/03/2020 3.7     Total Bilirubin 04/03/2020 0.2     Alkaline Phosphatase 04/03/2020 96     AST 04/03/2020 15     ALT 04/03/2020 32     Anion Gap 04/03/2020 12     eGFR if African American 04/03/2020 >60     eGFR if non African Amer* 04/03/2020 >60    Lab Visit on 02/18/2020   Component Date Value    Hemoglobin A1C 02/18/2020 7.1*    Estimated Avg Glucose 02/18/2020 157*    Cholesterol 02/18/2020 126     Triglycerides 02/18/2020 152*    HDL 02/18/2020 46     LDL Cholesterol 02/18/2020 49.6*    Hdl/Cholesterol Ratio 02/18/2020 36.5     Total Cholesterol/HDL Ra* 02/18/2020 2.7     Non-HDL Cholesterol 02/18/2020 80     WBC 02/18/2020 7.71     RBC 02/18/2020 4.14     Hemoglobin 02/18/2020 11.2*    Hematocrit 02/18/2020 38.8     Mean Corpuscular Volume 02/18/2020 94     Mean Corpuscular Hemoglo* 02/18/2020  27.1     Mean Corpuscular Hemoglo* 02/18/2020 28.9*    RDW 02/18/2020 15.7*    Platelets 02/18/2020 370*    MPV 02/18/2020 10.1     Immature Granulocytes 02/18/2020 0.4     Gran # (ANC) 02/18/2020 4.1     Immature Grans (Abs) 02/18/2020 0.03     Lymph # 02/18/2020 3.0     Mono # 02/18/2020 0.3     Eos # 02/18/2020 0.2     Baso # 02/18/2020 0.06     nRBC 02/18/2020 0     Gran% 02/18/2020 53.7     Lymph% 02/18/2020 39.2     Mono% 02/18/2020 4.0     Eosinophil% 02/18/2020 1.9     Basophil% 02/18/2020 0.8     Differential Method 02/18/2020 Automated     Vit D, 25-Hydroxy 02/18/2020 24*    Ferritin 02/18/2020 62     Iron 02/18/2020 77     Transferrin 02/18/2020 270     TIBC 02/18/2020 400     Saturated Iron 02/18/2020 19*   Clinical Support on 01/17/2020   Component Date Value    Interpretation 01/20/2020                      Value:  Normal spirometry. (FEV1/VC greater than or equal to LLN and FVC greater than or equal to LLN)  Normal airflow. (FEV1/VC greater than or equal to LLN)  Airflow is not clearly improved after bronchodilator. Clinical improvement following bronchodilator therapy may occur in the absence of spirometric improvement.         Post FVC 01/20/2020 1.94     Post FEV1 01/20/2020 1.68     Post FEV1 FVC 01/20/2020 86.76     Post FEF 25 75 01/20/2020 2.23     Post PEF 01/20/2020 4.22     Post  01/20/2020 7.00     Pre FVC 01/20/2020 1.89     Pre FEV1 01/20/2020 1.57     Pre FEV1 FVC 01/20/2020 83.18     Pre FEF 25 75 01/20/2020 1.82     Pre PEF 01/20/2020 4.11     Pre  01/20/2020 7.48     FVC Ref 01/20/2020 2.21     FVC LLN 01/20/2020 1.70     FVC Pre Ref 01/20/2020 85.1     FVC Post Ref 01/20/2020 87.7     FVC Chg 01/20/2020 3.0     FEV1 Ref 01/20/2020 1.83     FEV1 LLN 01/20/2020 1.39     FEV1 Pre Ref 01/20/2020 85.8     FEV1 Post Ref 01/20/2020 92.1     FEV1 Chg 01/20/2020 7.4     FEV1 FVC Ref 01/20/2020 83     FEV1 FVC LLN 01/20/2020 72      FEV1 FVC Pre Ref 01/20/2020 100.8     FEV1 FVC Post Ref 01/20/2020 105.1     FEV1 FVC Chg 01/20/2020 4.3     FEF 25 75 Ref 01/20/2020 2.07     FEF 25 75 LLN 01/20/2020 1.04     FEF 25 75 Pre Ref 01/20/2020 87.7     FEF 25 75 Post Ref 01/20/2020 107.5     FEF 25 75 Chg 01/20/2020 22.6     PEF Ref 01/20/2020 5.10     PEF LLN 01/20/2020 3.57     PEF Pre Ref 01/20/2020 80.6     PEF Post Ref 01/20/2020 82.9     PEF Chg 01/20/2020 2.9     [id number] Chg 01/20/2020 -6.4    Admission on 01/05/2020, Discharged on 01/06/2020   Component Date Value    Influenza A, Molecular 01/05/2020 Negative     Influenza B, Molecular 01/05/2020 Negative     Flu A & B Source 01/05/2020 Nasal swab     WBC 01/05/2020 9.06     RBC 01/05/2020 3.67*    Hemoglobin 01/05/2020 10.1*    Hematocrit 01/05/2020 33.1*    Mean Corpuscular Volume 01/05/2020 90     Mean Corpuscular Hemoglo* 01/05/2020 27.5     Mean Corpuscular Hemoglo* 01/05/2020 30.5*    RDW 01/05/2020 14.6*    Platelets 01/05/2020 294     MPV 01/05/2020 9.9     Immature Granulocytes 01/05/2020 0.9*    Gran # (ANC) 01/05/2020 5.4     Immature Grans (Abs) 01/05/2020 0.08*    Lymph # 01/05/2020 2.7     Mono # 01/05/2020 0.5     Eos # 01/05/2020 0.3     Baso # 01/05/2020 0.02     nRBC 01/05/2020 0     Gran% 01/05/2020 59.5     Lymph% 01/05/2020 30.1     Mono% 01/05/2020 5.7     Eosinophil% 01/05/2020 3.6     Basophil% 01/05/2020 0.2     Differential Method 01/05/2020 Automated     Sodium 01/05/2020 140     Potassium 01/05/2020 4.3     Chloride 01/05/2020 104     CO2 01/05/2020 24     Glucose 01/05/2020 187*    BUN, Bld 01/05/2020 10     Creatinine 01/05/2020 0.7     Calcium 01/05/2020 9.3     Total Protein 01/05/2020 6.8     Albumin 01/05/2020 3.4*    Total Bilirubin 01/05/2020 0.2     Alkaline Phosphatase 01/05/2020 95     AST 01/05/2020 15     ALT 01/05/2020 23     Anion Gap 01/05/2020 12     eGFR if African American 01/05/2020 >60      eGFR if non African Amer* 01/05/2020 >60     Lipase 01/05/2020 12     Specimen UA 01/05/2020 Urine, Clean Catch     Color, UA 01/05/2020 Yellow     Appearance, UA 01/05/2020 Clear     pH, UA 01/05/2020 7.0     Specific Gravity, UA 01/05/2020 <=1.005*    Protein, UA 01/05/2020 Negative     Glucose, UA 01/05/2020 Negative     Ketones, UA 01/05/2020 Negative     Bilirubin (UA) 01/05/2020 Negative     Occult Blood UA 01/05/2020 Negative     Nitrite, UA 01/05/2020 Negative     Urobilinogen, UA 01/05/2020 Negative     Leukocytes, UA 01/05/2020 Negative    Admission on 01/03/2020, Discharged on 01/04/2020   Component Date Value    POCT Glucose 01/03/2020 149*    Final Pathologic Diagnos* 01/03/2020                      Value:Gallbladder (cholecystectomy):  - Benign gallbladder  - Cholelithiasis      Gross 01/03/2020                      Value:ID/AP Label:  [id number], designated gallbladder    Received in formalin labeled with patient's name medical record number is an  intact gallbladder (9.4 cm in length by 3.2 cm in greatest diameter).  The  serosal surface is purple tan smooth and glistening with roughening around  the liver edge.  The cystic duct margin is inked in black and the specimen is  opened longitudinally revealing viscous romario bile.  Five multifaceted black  stones are identified within the lumen measuring up to 0.3 cm with an  aggregate measurement of 0.5 x 0.5 x 0.3 cm.  The gallbladder wall measures  up to 0.1 cm in greatest thickness.  The mucosal surface is yellow-tan and  smooth with no lesions or polyps.  Representative sections are submitted in  cassette [id number] including cyst duct margin, inked in black.      POCT Glucose 01/03/2020 144*    POCT Glucose 01/03/2020 109     POCT Glucose 01/04/2020 126*    POCT Glucose 01/04/2020 153*    POCT Glucose 01/03/2020 197*   Lab Visit on 01/02/2020   Component Date Value    Sodium 01/02/2020 139     Potassium 01/02/2020 4.2      Chloride 01/02/2020 101     CO2 01/02/2020 28     Glucose 01/02/2020 151*    BUN, Bld 01/02/2020 9     Creatinine 01/02/2020 0.7     Calcium 01/02/2020 9.7     Anion Gap 01/02/2020 10     eGFR if African American 01/02/2020 >60     eGFR if non African Amer* 01/02/2020 >60     Magnesium 01/02/2020 1.9    Admission on 12/29/2019, Discharged on 12/29/2019   Component Date Value    POCT Glucose 12/29/2019 105    Admission on 12/28/2019, Discharged on 12/28/2019   Component Date Value    WBC 12/28/2019 9.89     RBC 12/28/2019 4.22     Hemoglobin 12/28/2019 11.6*    Hematocrit 12/28/2019 37.0     Mean Corpuscular Volume 12/28/2019 88     Mean Corpuscular Hemoglo* 12/28/2019 27.5     Mean Corpuscular Hemoglo* 12/28/2019 31.4*    RDW 12/28/2019 14.3     Platelets 12/28/2019 350     MPV 12/28/2019 9.8     Immature Granulocytes 12/28/2019 0.4     Gran # (ANC) 12/28/2019 5.7     Immature Grans (Abs) 12/28/2019 0.04     Lymph # 12/28/2019 3.4     Mono # 12/28/2019 0.5     Eos # 12/28/2019 0.1     Baso # 12/28/2019 0.05     nRBC 12/28/2019 0     Gran% 12/28/2019 58.0     Lymph% 12/28/2019 34.7     Mono% 12/28/2019 5.0     Eosinophil% 12/28/2019 1.4     Basophil% 12/28/2019 0.5     Differential Method 12/28/2019 Automated     Sodium 12/28/2019 137     Potassium 12/28/2019 4.0     Chloride 12/28/2019 98     CO2 12/28/2019 26     Glucose 12/28/2019 136*    BUN, Bld 12/28/2019 12     Creatinine 12/28/2019 0.8     Calcium 12/28/2019 9.5     Total Protein 12/28/2019 7.5     Albumin 12/28/2019 3.8     Total Bilirubin 12/28/2019 0.3     Alkaline Phosphatase 12/28/2019 82     AST 12/28/2019 38     ALT 12/28/2019 50*    Anion Gap 12/28/2019 13     eGFR if African American 12/28/2019 >60     eGFR if non African Amer* 12/28/2019 >60     Lipase 12/28/2019 18     Specimen UA 12/28/2019 Urine, Clean Catch     Color, UA 12/28/2019 Yellow     Appearance, UA 12/28/2019 Clear     pH, UA  12/28/2019 5.0     Specific Ph[place], UA 12/28/2019 1.010     Protein, UA 12/28/2019 Negative     Glucose, UA 12/28/2019 Negative     Ketones, UA 12/28/2019 Negative     Bilirubin (UA) 12/28/2019 Negative     Occult Blood UA 12/28/2019 Negative     Nitrite, UA 12/28/2019 Negative     Urobilinogen, UA 12/28/2019 Negative     Leukocytes, UA 12/28/2019 Negative    There may be more visits with results that are not included.       Assessment:     1. Type 2 diabetes, uncontrolled, with neuropathy     2. Essential hypertension     3. Shortness of breath      patient has had several symptoms concerning for possible infection was illness that could be exacerbating her diabetes and she is also still struggling with shortness of breath and chest symptoms and is waiting for a call back from cardiology.  In the meantime will increase her Toujeo to 90 units q.h.s. and increase her NovoLog to 20 units t.i.d. a.c., continue metformin and Victoza     Will change her toujeo order to toujeo max which allows higher doses to be distributed    Video visit in 2 weeks  Plan:   Type 2 diabetes, uncontrolled, with neuropathy    Essential hypertension    Shortness of breath          No follow-ups on file.    Labs prior to appointment? not applicable     Disclaimer:  This note may have been partially prepared using voice recognition software and  it may have not been extensively proofed, as such there could be errors within the text such as sound alike errors.

## 2020-04-16 ENCOUNTER — NURSE TRIAGE (OUTPATIENT)
Dept: ADMINISTRATIVE | Facility: CLINIC | Age: 50
End: 2020-04-16

## 2020-04-17 ENCOUNTER — TELEPHONE (OUTPATIENT)
Dept: INTERNAL MEDICINE | Facility: CLINIC | Age: 50
End: 2020-04-17

## 2020-04-17 ENCOUNTER — LAB VISIT (OUTPATIENT)
Dept: LAB | Facility: HOSPITAL | Age: 50
End: 2020-04-17
Attending: FAMILY MEDICINE
Payer: MEDICAID

## 2020-04-17 ENCOUNTER — OFFICE VISIT (OUTPATIENT)
Dept: INTERNAL MEDICINE | Facility: CLINIC | Age: 50
End: 2020-04-17
Payer: MEDICAID

## 2020-04-17 DIAGNOSIS — Z79.4 TYPE 2 DIABETES MELLITUS WITH HYPERGLYCEMIA, WITH LONG-TERM CURRENT USE OF INSULIN: Chronic | ICD-10-CM

## 2020-04-17 DIAGNOSIS — G47.36 NOCTURNAL HYPOXEMIA DUE TO OBESITY: Chronic | ICD-10-CM

## 2020-04-17 DIAGNOSIS — I15.2 HYPERTENSION COMPLICATING DIABETES: Chronic | ICD-10-CM

## 2020-04-17 DIAGNOSIS — R42 DIZZINESS: ICD-10-CM

## 2020-04-17 DIAGNOSIS — J45.30 MILD PERSISTENT ASTHMA WITHOUT COMPLICATION: Chronic | ICD-10-CM

## 2020-04-17 DIAGNOSIS — E11.59 HYPERTENSION COMPLICATING DIABETES: Chronic | ICD-10-CM

## 2020-04-17 DIAGNOSIS — F31.9 BIPOLAR 1 DISORDER: Chronic | ICD-10-CM

## 2020-04-17 DIAGNOSIS — E66.9 NOCTURNAL HYPOXEMIA DUE TO OBESITY: Chronic | ICD-10-CM

## 2020-04-17 DIAGNOSIS — R42 DIZZINESS: Primary | ICD-10-CM

## 2020-04-17 DIAGNOSIS — E11.65 TYPE 2 DIABETES MELLITUS WITH HYPERGLYCEMIA, WITH LONG-TERM CURRENT USE OF INSULIN: Chronic | ICD-10-CM

## 2020-04-17 LAB
ALBUMIN SERPL BCP-MCNC: 3.9 G/DL (ref 3.5–5.2)
ALP SERPL-CCNC: 93 U/L (ref 55–135)
ALT SERPL W/O P-5'-P-CCNC: 33 U/L (ref 10–44)
ANION GAP SERPL CALC-SCNC: 14 MMOL/L (ref 8–16)
AST SERPL-CCNC: 19 U/L (ref 10–40)
BASOPHILS # BLD AUTO: 0.06 K/UL (ref 0–0.2)
BASOPHILS NFR BLD: 0.8 % (ref 0–1.9)
BILIRUB SERPL-MCNC: 0.1 MG/DL (ref 0.1–1)
BUN SERPL-MCNC: 14 MG/DL (ref 6–20)
CALCIUM SERPL-MCNC: 9.8 MG/DL (ref 8.7–10.5)
CHLORIDE SERPL-SCNC: 100 MMOL/L (ref 95–110)
CO2 SERPL-SCNC: 24 MMOL/L (ref 23–29)
CREAT SERPL-MCNC: 0.7 MG/DL (ref 0.5–1.4)
DIFFERENTIAL METHOD: ABNORMAL
EOSINOPHIL # BLD AUTO: 0.2 K/UL (ref 0–0.5)
EOSINOPHIL NFR BLD: 2.3 % (ref 0–8)
ERYTHROCYTE [DISTWIDTH] IN BLOOD BY AUTOMATED COUNT: 15.5 % (ref 11.5–14.5)
EST. GFR  (AFRICAN AMERICAN): >60 ML/MIN/1.73 M^2
EST. GFR  (NON AFRICAN AMERICAN): >60 ML/MIN/1.73 M^2
GLUCOSE SERPL-MCNC: 264 MG/DL (ref 70–110)
HCT VFR BLD AUTO: 36.5 % (ref 37–48.5)
HGB BLD-MCNC: 11.5 G/DL (ref 12–16)
IMM GRANULOCYTES # BLD AUTO: 0.04 K/UL (ref 0–0.04)
IMM GRANULOCYTES NFR BLD AUTO: 0.5 % (ref 0–0.5)
LYMPHOCYTES # BLD AUTO: 2.7 K/UL (ref 1–4.8)
LYMPHOCYTES NFR BLD: 37.3 % (ref 18–48)
MCH RBC QN AUTO: 29 PG (ref 27–31)
MCHC RBC AUTO-ENTMCNC: 31.5 G/DL (ref 32–36)
MCV RBC AUTO: 92 FL (ref 82–98)
MONOCYTES # BLD AUTO: 0.4 K/UL (ref 0.3–1)
MONOCYTES NFR BLD: 5.2 % (ref 4–15)
NEUTROPHILS # BLD AUTO: 4 K/UL (ref 1.8–7.7)
NEUTROPHILS NFR BLD: 54.4 % (ref 38–73)
NRBC BLD-RTO: 0 /100 WBC
PLATELET # BLD AUTO: 296 K/UL (ref 150–350)
PMV BLD AUTO: 9.5 FL (ref 9.2–12.9)
POTASSIUM SERPL-SCNC: 4.9 MMOL/L (ref 3.5–5.1)
PROT SERPL-MCNC: 7.3 G/DL (ref 6–8.4)
RBC # BLD AUTO: 3.97 M/UL (ref 4–5.4)
SODIUM SERPL-SCNC: 138 MMOL/L (ref 136–145)
WBC # BLD AUTO: 7.32 K/UL (ref 3.9–12.7)

## 2020-04-17 PROCEDURE — 85025 COMPLETE CBC W/AUTO DIFF WBC: CPT

## 2020-04-17 PROCEDURE — 36415 COLL VENOUS BLD VENIPUNCTURE: CPT

## 2020-04-17 PROCEDURE — 99214 PR OFFICE/OUTPT VISIT, EST, LEVL IV, 30-39 MIN: ICD-10-PCS | Mod: 95,,, | Performed by: FAMILY MEDICINE

## 2020-04-17 PROCEDURE — 99214 OFFICE O/P EST MOD 30 MIN: CPT | Mod: 95,,, | Performed by: FAMILY MEDICINE

## 2020-04-17 PROCEDURE — 80053 COMPREHEN METABOLIC PANEL: CPT

## 2020-04-17 NOTE — TELEPHONE ENCOUNTER
----- Message from Arleth Martinez MD sent at 4/17/2020  4:03 PM CDT -----  Urine did not show any acute infection other than excess amount of glucose as well as in blood.  No sign of dehydration noted.  Advised to increase NovoLog from 20-22 units as discussed during the virtual visit and if sugars still remain elevated above 200 increase it to 24 units 3 times a day.   If continues to have persistent weakness and dizziness patient to go to ER.

## 2020-04-17 NOTE — TELEPHONE ENCOUNTER
Patient States Having severe diarrhea, dehydrated, cbg >300 today  Was having vomiting, but stopped 3 days ago  Intermittently sick for two weeks  Urinated 7-8 times day, says she is drinking fluids all day   Feels lightheaded with standing/walking and having to hold onto furniture intermittently with walking  Feels sometimes she may fall at times due to weakness  Today feels especially weak  Recommended ED now for evaluation. She is unsure if she will go.    Reason for Disposition   Patient sounds very sick or weak to the triager    Additional Information   Negative: SEVERE difficulty breathing (e.g., struggling for each breath, speaks in single words)   Negative: Difficult to awaken or acting confused (e.g., disoriented, slurred speech)   Negative: Bluish (or gray) lips or face now   Negative: Shock suspected (e.g., cold/pale/clammy skin, too weak to stand, low BP, rapid pulse)   Negative: Sounds like a life-threatening emergency to the triager   Negative: [1] COVID-19 suspected (e.g., cough, fever, shortness of breath) AND [2] public health department recommends testing   Negative: [1] COVID-19 exposure AND [2] no symptoms   Negative: COVID-19 and Breastfeeding, questions about   Negative: SEVERE or constant chest pain (Exception: mild central chest pain, present only when coughing)   Negative: MODERATE difficulty breathing (e.g., speaks in phrases, SOB even at rest, pulse 100-120)    Protocols used: CORONAVIRUS (COVID-19) DIAGNOSED OR [id number]

## 2020-04-17 NOTE — TELEPHONE ENCOUNTER
Informed pt that I would attempt to get her another appointment and call her whenever I find something.

## 2020-04-17 NOTE — TELEPHONE ENCOUNTER
Spoke with patient and read her the message from Dr. Martinez about her lab results and changes needed in her Novolog prescription dosage. Patient verbalized understanding.

## 2020-04-17 NOTE — TELEPHONE ENCOUNTER
----- Message from Prema Mata sent at 4/17/2020 12:13 PM CDT -----  Contact: self  Pt requesting a call back regarding virtual visit. Please call pt back at [phone]

## 2020-04-17 NOTE — TELEPHONE ENCOUNTER
Spoke with patient and she stated that she did not go to the emergency room last night after being advised to by the phone triage nurse because she does not want to be around possible coronavirus patients. She said that she is still feeling weak and dizzy and that her blood glucose was 199 this morning. She wanted a video visit today with one of our providers. Appointment scheduled with Dr. Quintana for 11:00 am.

## 2020-04-17 NOTE — PROGRESS NOTES
The patient location is: Home  The chief complaint leading to consultation is:  Weakness, dizziness  Visit type: audiovisual  Total time spent with patient:  15 min  Each patient to whom he or she provides medical services by telemedicine is:  (1) informed of the relationship between the physician and patient and the respective role of any other health care provider with respect to management of the patient; and (2) notified that he or she may decline to receive medical services by telemedicine and may withdraw from such care at any time.    Notes:   Subjective:       Patient ID: Yolanda Betts is a 49 y.o. female.    Chief Complaint: No chief complaint on file.    49-year-old  female patient of Dr. Beach with Patient Active Problem List:     GERD (gastroesophageal reflux disease)     Allergic rhinitis     Menopausal syndrome (hot flashes)     Migraine with aura and without status migrainosus, not intractable     Abnormal ECG     Sleep-related bruxism     Diabetic polyneuropathy associated with type 2 diabetes mellitus     Mild persistent asthma without complication     Bipolar 1 disorder     Diffusion capacity of lung (dl), decreased     Hypertriglyceridemia     Anxiety     Type 2 diabetes mellitus with hyperglycemia, with long-term current use of insulin     Iron deficiency anemia     Screen for colon cancer     Schizoaffective disorder, bipolar type     Vitamin D deficiency     Fibromyalgia     Hypertension complicating diabetes     Dyslipidemia associated with type 2 diabetes mellitus     Seasonal allergic rhinitis due to pollen     Nocturnal hypoxemia due to obesity - WITHOUT ALEN     Obesity hypoventilation syndrome     Hyperaldosteronism     Morbid obesity with BMI of 40.0-44.9, adult     Calculus of gallbladder without cholecystitis without obstruction     Cholelithiasis     High risk of appointment cancellation or no-show     Chronic constipation  Reports that patient started to  have weakness and feeling dizzy and mild shortness of breath, reports that she has checked her blood glucose levels which has been running in the range of 320. Patient recently went to ER and had complete evaluation done, was tested for COVID which came back negative.   Reports that she has been taking her insulin regularly but sugars prior were running in the range of 170s to 180s and now has escalated to 300s.   Patient has been working on lowering blood glucose levels  Reports that she has been feeling weak, and dizzy, but does not want to go to ER for complete evaluation.   Reports minimal discomfort with urine but denies any burning sensation with urine or increased urinary frequency, or urgency, abdominal discomfort, vomiting.  Denies any loose stools but has been having frequent bowel movements.     Review of Systems   Constitutional: Positive for fatigue. Negative for chills and fever.   Gastrointestinal: Positive for diarrhea and nausea. Negative for abdominal pain, constipation and vomiting.   Endocrine: Negative for polydipsia, polyphagia and polyuria.   Genitourinary: Negative for frequency, hematuria and urgency.        Discomfort with urine   Neurological: Positive for dizziness and light-headedness. Negative for syncope.         There were no vitals taken for this visit.  Objective:      Physical Exam   Constitutional: She is oriented to person, place, and time. She appears well-developed and well-nourished. No distress.   Pulmonary/Chest: No respiratory distress.   Neurological: She is alert and oriented to person, place, and time.   Skin: She is not diaphoretic.   Psychiatric: She has a normal mood and affect.         Assessment/Plan:   1. Dizziness  - CBC auto differential; Future  - Comprehensive metabolic panel; Future  - Urinalysis; Future  - Urine culture; Future    Patient was advised to come into the clinic for further evaluation, secondary to elevated blood glucose levels and low sodium  levels, checked out recently in the ER and with COVID negative, if symptoms continue to persist patient may benefit from going to ER for possibly fluids to rule out dehydration or other acute infectious etiology  Will check further labs to rule out acute infection including UTI  Encouraged to drink adequate fluids    2. Type 2 diabetes mellitus with hyperglycemia, with long-term current use of insulin  Patient was advised to increase NovoLog to 22 units 3 times daily and if sugars have been still persistently elevated in 300s, go up to 24 units 3 times a day.   Continue Toujeo 90 units daily, metformin 1000 mg twice daily, and Victoza    3. Hypertension complicating diabetes    4. Bipolar 1 disorder  To be followed by her psychiatrist, reported that only new medication added was Thorazine 50 mg at bedtime    5. Mild persistent asthma without complication  6. Nocturnal hypoxemia due to obesity - WITHOUT ALEN  Reports using 2 L of oxygen regularly

## 2020-04-18 ENCOUNTER — NURSE TRIAGE (OUTPATIENT)
Dept: ADMINISTRATIVE | Facility: CLINIC | Age: 50
End: 2020-04-18

## 2020-04-19 ENCOUNTER — NURSE TRIAGE (OUTPATIENT)
Dept: ADMINISTRATIVE | Facility: CLINIC | Age: 50
End: 2020-04-19

## 2020-04-19 ENCOUNTER — HOSPITAL ENCOUNTER (EMERGENCY)
Facility: HOSPITAL | Age: 50
Discharge: HOME | End: 2020-04-19
Attending: EMERGENCY MEDICINE
Payer: MEDICAID

## 2020-04-19 VITALS
WEIGHT: 209 LBS | TEMPERATURE: 99 F | HEIGHT: 58 IN | SYSTOLIC BLOOD PRESSURE: 118 MMHG | RESPIRATION RATE: 20 BRPM | DIASTOLIC BLOOD PRESSURE: 68 MMHG | OXYGEN SATURATION: 98 % | HEART RATE: 94 BPM | BODY MASS INDEX: 43.87 KG/M2

## 2020-04-19 DIAGNOSIS — R06.00 DYSPNEA: ICD-10-CM

## 2020-04-19 DIAGNOSIS — J44.1 COPD WITH ACUTE EXACERBATION: Primary | ICD-10-CM

## 2020-04-19 DIAGNOSIS — N39.0 URINARY TRACT INFECTION WITHOUT HEMATURIA, SITE UNSPECIFIED: ICD-10-CM

## 2020-04-19 DIAGNOSIS — R73.9 HYPERGLYCEMIA: ICD-10-CM

## 2020-04-19 LAB
ALBUMIN SERPL BCP-MCNC: 3.5 G/DL (ref 3.5–5.2)
ALP SERPL-CCNC: 92 U/L (ref 55–135)
ALT SERPL W/O P-5'-P-CCNC: 29 U/L (ref 10–44)
ANION GAP SERPL CALC-SCNC: 14 MMOL/L (ref 8–16)
AST SERPL-CCNC: 25 U/L (ref 10–40)
BACTERIA #/AREA URNS HPF: NORMAL /HPF
BASOPHILS # BLD AUTO: 0.06 K/UL (ref 0–0.2)
BASOPHILS NFR BLD: 0.8 % (ref 0–1.9)
BILIRUB SERPL-MCNC: 0.2 MG/DL (ref 0.1–1)
BILIRUB UR QL STRIP: NEGATIVE
BUN SERPL-MCNC: 14 MG/DL (ref 6–20)
CALCIUM SERPL-MCNC: 9 MG/DL (ref 8.7–10.5)
CHLORIDE SERPL-SCNC: 103 MMOL/L (ref 95–110)
CLARITY UR: CLEAR
CO2 SERPL-SCNC: 20 MMOL/L (ref 23–29)
COLOR UR: YELLOW
CREAT SERPL-MCNC: 0.8 MG/DL (ref 0.5–1.4)
DIFFERENTIAL METHOD: ABNORMAL
EOSINOPHIL # BLD AUTO: 0.2 K/UL (ref 0–0.5)
EOSINOPHIL NFR BLD: 2 % (ref 0–8)
ERYTHROCYTE [DISTWIDTH] IN BLOOD BY AUTOMATED COUNT: 15.3 % (ref 11.5–14.5)
EST. GFR  (AFRICAN AMERICAN): >60 ML/MIN/1.73 M^2
EST. GFR  (NON AFRICAN AMERICAN): >60 ML/MIN/1.73 M^2
GLUCOSE SERPL-MCNC: 343 MG/DL (ref 70–110)
GLUCOSE UR QL STRIP: ABNORMAL
HCT VFR BLD AUTO: 35.2 % (ref 37–48.5)
HGB BLD-MCNC: 11 G/DL (ref 12–16)
HGB UR QL STRIP: NEGATIVE
IMM GRANULOCYTES # BLD AUTO: 0.05 K/UL (ref 0–0.04)
IMM GRANULOCYTES NFR BLD AUTO: 0.7 % (ref 0–0.5)
KETONES UR QL STRIP: NEGATIVE
LEUKOCYTE ESTERASE UR QL STRIP: NEGATIVE
LYMPHOCYTES # BLD AUTO: 3.1 K/UL (ref 1–4.8)
LYMPHOCYTES NFR BLD: 42.2 % (ref 18–48)
MAGNESIUM SERPL-MCNC: 1.7 MG/DL (ref 1.6–2.6)
MCH RBC QN AUTO: 29 PG (ref 27–31)
MCHC RBC AUTO-ENTMCNC: 31.3 G/DL (ref 32–36)
MCV RBC AUTO: 93 FL (ref 82–98)
MICROSCOPIC COMMENT: NORMAL
MONOCYTES # BLD AUTO: 0.4 K/UL (ref 0.3–1)
MONOCYTES NFR BLD: 5.8 % (ref 4–15)
NEUTROPHILS # BLD AUTO: 3.6 K/UL (ref 1.8–7.7)
NEUTROPHILS NFR BLD: 48.5 % (ref 38–73)
NITRITE UR QL STRIP: NEGATIVE
NRBC BLD-RTO: 0 /100 WBC
PH UR STRIP: 6 [PH] (ref 5–8)
PLATELET # BLD AUTO: 304 K/UL (ref 150–350)
PMV BLD AUTO: 9.8 FL (ref 9.2–12.9)
POCT GLUCOSE: 177 MG/DL (ref 70–110)
POCT GLUCOSE: 331 MG/DL (ref 70–110)
POTASSIUM SERPL-SCNC: 5 MMOL/L (ref 3.5–5.1)
PROT SERPL-MCNC: 7.1 G/DL (ref 6–8.4)
PROT UR QL STRIP: NEGATIVE
RBC # BLD AUTO: 3.79 M/UL (ref 4–5.4)
SARS-COV-2 RDRP RESP QL NAA+PROBE: NEGATIVE
SODIUM SERPL-SCNC: 137 MMOL/L (ref 136–145)
SP GR UR STRIP: 1.02 (ref 1–1.03)
SQUAMOUS #/AREA URNS HPF: 10 /HPF
TROPONIN I SERPL DL<=0.01 NG/ML-MCNC: 0.01 NG/ML (ref 0–0.03)
URN SPEC COLLECT METH UR: ABNORMAL
UROBILINOGEN UR STRIP-ACNC: NEGATIVE EU/DL
WBC # BLD AUTO: 7.39 K/UL (ref 3.9–12.7)
WBC #/AREA URNS HPF: 2 /HPF (ref 0–5)
YEAST URNS QL MICRO: NORMAL

## 2020-04-19 PROCEDURE — 93005 ELECTROCARDIOGRAM TRACING: CPT

## 2020-04-19 PROCEDURE — 84484 ASSAY OF TROPONIN QUANT: CPT

## 2020-04-19 PROCEDURE — 99285 EMERGENCY DEPT VISIT HI MDM: CPT | Mod: 25

## 2020-04-19 PROCEDURE — 85025 COMPLETE CBC W/AUTO DIFF WBC: CPT

## 2020-04-19 PROCEDURE — 25000003 PHARM REV CODE 250: Performed by: EMERGENCY MEDICINE

## 2020-04-19 PROCEDURE — U0002 COVID-19 LAB TEST NON-CDC: HCPCS

## 2020-04-19 PROCEDURE — 94640 AIRWAY INHALATION TREATMENT: CPT

## 2020-04-19 PROCEDURE — 81000 URINALYSIS NONAUTO W/SCOPE: CPT

## 2020-04-19 PROCEDURE — 25000242 PHARM REV CODE 250 ALT 637 W/ HCPCS: Performed by: EMERGENCY MEDICINE

## 2020-04-19 PROCEDURE — 80053 COMPREHEN METABOLIC PANEL: CPT

## 2020-04-19 PROCEDURE — 93010 ELECTROCARDIOGRAM REPORT: CPT | Mod: ,,, | Performed by: INTERNAL MEDICINE

## 2020-04-19 PROCEDURE — 93010 EKG 12-LEAD: ICD-10-PCS | Mod: ,,, | Performed by: INTERNAL MEDICINE

## 2020-04-19 PROCEDURE — 82962 GLUCOSE BLOOD TEST: CPT

## 2020-04-19 PROCEDURE — 83735 ASSAY OF MAGNESIUM: CPT

## 2020-04-19 PROCEDURE — 36415 COLL VENOUS BLD VENIPUNCTURE: CPT

## 2020-04-19 RX ORDER — NITROFURANTOIN 25; 75 MG/1; MG/1
100 CAPSULE ORAL 2 TIMES DAILY
Qty: 10 CAPSULE | Refills: 0 | Status: SHIPPED | OUTPATIENT
Start: 2020-04-19 | End: 2020-04-24

## 2020-04-19 RX ORDER — NITROFURANTOIN 25; 75 MG/1; MG/1
100 CAPSULE ORAL 2 TIMES DAILY
Qty: 10 CAPSULE | Refills: 0 | Status: SHIPPED | OUTPATIENT
Start: 2020-04-19 | End: 2020-04-19 | Stop reason: SDUPTHER

## 2020-04-19 RX ORDER — IPRATROPIUM BROMIDE AND ALBUTEROL SULFATE 2.5; .5 MG/3ML; MG/3ML
3 SOLUTION RESPIRATORY (INHALATION)
Status: COMPLETED | OUTPATIENT
Start: 2020-04-19 | End: 2020-04-19

## 2020-04-19 RX ADMIN — SODIUM CHLORIDE 1000 ML: 0.9 INJECTION, SOLUTION INTRAVENOUS at 09:04

## 2020-04-19 RX ADMIN — IPRATROPIUM BROMIDE AND ALBUTEROL SULFATE 3 ML: .5; 2.5 SOLUTION RESPIRATORY (INHALATION) at 07:04

## 2020-04-19 NOTE — TELEPHONE ENCOUNTER
"C/O was initially a question concerning medicaid. pt stopped in mid-sentence and states she forgot what she was wanting to say and fell asleep. Pt states she feels like she is  "talking out of your head", she not sleepy however is lethargic, lightheadedness, and weakness. States she her breathing sounds like a freight train and it scares her. Also states she was just placed on thorazine, however takes at night. Took metformin, losartan, xanax, magnesium, gabapentin, amlodipine, cymbalta, spironolactone, hydralazine, toprol xl. Checked vitals this morning upon wakening: /87 . Pt states she is standing and walking without difficulty. Denies dizziness, chest pain or SOB. Current vitals: 124/79 BP 99 HR. Uses oxygen at night. AAOx3.    Triage noted patient falling asleep in conversation. Easily aroused. Pt denies SOB or difficulty breathing, noted snoring during sleep. No acute distress noted. Triage call was made yesterday. Pt did not follow disposition instructions. [facility] notified of patients incoming visit. Spoke with Danae.   Reason for Disposition   [1] MODERATE weakness (i.e., interferes with work, school, normal activities) AND [2] persists > 3 days   Patient sounds very sick or weak to the triager    Additional Information   Negative: Severe difficulty breathing (e.g., struggling for each breath, speaks in single words)   Negative: Shock suspected (e.g., cold/pale/clammy skin, too weak to stand, low BP, rapid pulse)   Negative: Difficult to awaken or acting confused (e.g., disoriented, slurred speech)   Negative: [1] Fainted > 15 minutes ago AND [2] still feels too weak or dizzy to stand   Negative: [1] SEVERE weakness (i.e., unable to walk or barely able to walk, requires support) AND     [2] new onset or worsening   Negative: Weakness of the face, arm or leg on one side of the body   Negative: [1] Has diabetes (diabetes mellitus) AND [2] weakness from low blood sugar (i.e., < " "60 mg/dl or 3.5 mmol/l)   Negative: Heat exhaustion suspected (i.e., dehydration from heat exposure)   Negative: Vomiting is main symptom   Negative: Diarrhea is main symptom   Negative: Difficulty breathing   Negative: Heart beating < 50 beats per minute OR > 140 beats per minute   Negative: Extra heart beats OR irregular heart beating   (i.e., "palpitations")   Negative: Follows bleeding (e.g., from vomiting, rectum, vagina; EXCEPTION: small brief weakness from sight of a small amount blood)   Negative: Black or tarry bowel movements   Negative: [1] Drinking very little AND [2] dehydration suspected (e.g., no urine > 12 hours, very dry mouth, very lightheaded)   Negative: Patient sounds very sick or weak to the triager   Negative: Serious injury with multiple fractures   Negative: [1] Major bleeding (e.g., actively dripping or spurting) AND [2] can't be stopped   Negative: Bullet wound, stabbed by knife, or other serious penetrating wound   Negative: Looks like a dislocated joint (crooked or deformed)   Negative: Can't stand (bear weight) or walk   Negative: Sounds like a life-threatening emergency to the triager   Negative: Sounds like a life-threatening emergency to the triager   Negative: [1] SEVERE weakness (i.e., unable to walk or barely able to walk, requires support) AND [2] new onset or worsening   Negative: [1] Weakness (i.e., paralysis, loss of muscle strength) of the face, arm / hand, or leg / foot on one side of the body AND [2] sudden onset AND [3] present now   Negative: [1] Numbness (i.e., loss of sensation) of the face, arm / hand, or leg / foot on one side of the body AND [2] sudden onset AND [3] present now   Negative: [1] Loss of speech or garbled speech AND [2] sudden onset AND [3] present now   Negative: Difficult to awaken or acting confused (e.g., disoriented, slurred speech)   Negative: Sounds like a life-threatening emergency to the triager   Negative: [1] Weakness " (i.e., paralysis, loss of muscle strength) of the face, arm / hand, or leg / foot on one side of the body AND [2] sudden onset AND [3] brief (now gone)   Negative: [1] Numbness (i.e., loss of sensation) of the face, arm / hand, or leg / foot on one side of the body AND [2] sudden onset AND [3] brief (now gone)   Negative: [1] Loss of speech or garbled speech AND [2] sudden onset AND [3] brief (now gone)   Negative: Bell's palsy suspected (i.e., weakness on only one side of the face, developing over hours to days, no other symptoms)   Negative: Headache  (and neurologic deficit)   Negative: [1] Back pain AND [2] numbness (loss of sensation) in groin or rectal area   Negative: [1] Unable to urinate (or only a few drops) > 4 hours AND [2] bladder feels very full (e.g., palpable bladder or strong urge to urinate)   Negative: [1] Loss of control of bowel or bladder (i.e., incontinence) AND [2] new onset   Negative: Confusion, disorientation, or hallucinations is main symptom   Negative: Neck pain is main symptom (and having weakness, numbness, or tingling in arm / hand because of neck pain)   Negative: Back pain is main symptom (and having weakness, numbness, or tingling in leg because of back pain)   Negative: Hand pain is main symptom (and having mild weakness, numbness, or tingling in hand related to hand pain)   Negative: Dizziness is main symptom   Negative: Vision loss or change is main symptom   Negative: Followed a head injury within last 3 days   Negative: Followed a neck injury within last 3 days   Negative: [1] Tingling in both hands and/or feet AND [2] breathing faster than normal AND [3] feels similar to prior panic attack or hyperventilation episode   Negative: Weakness in both sides of the body or weakness all over    Protocols used: WEAKNESS (GENERALIZED) AND FATIGUE-A-AH, NEUROLOGIC DEFICIT-A-AH, LEG INJURY-A-AH, SLEEP [id number]

## 2020-04-19 NOTE — TELEPHONE ENCOUNTER
Patient reports she is currently on O2. She reports she is having difficulty breathing, she is very nauseated and she is too weak to stand. Per protocol she was advised to call 911. She verbalizes understanding.    Reason for Disposition   Shock suspected (e.g., cold/pale/clammy skin, too weak to stand, low BP, rapid pulse)    Protocols used: NAUSEA-A-AH

## 2020-04-19 NOTE — ED PROVIDER NOTES
SCRIBE #1 NOTE: I, Caitlyn Espinosa, am scribing for, and in the presence of, Maile Hines Do, MD. I have scribed the disposition and parts of the ED discussion.     History     Chief Complaint   Patient presents with    Shortness of Breath     sent to ED from nurse call line, she has called several times with c/o SOB, dizziness, weakness, nausea, vomiting, diarrhea, pt also reports her asthma is acting up, negative COVID 19 on 4/7; pt reports CBG at home has been >300       Review of patient's allergies indicates:   Allergen Reactions    Codeine Itching    Ibuprofen     Neuromuscular blockers, steroidal Hives     some    Penicillins     Sulfamethoxazole-trimethoprim     Latex, natural rubber Rash    Morphine Rash     itching    Norco [hydrocodone-acetaminophen] Itching, Rash and Hallucinations    Seconal [secobarbital sodium] Rash     itching    Tylox [oxycodone-acetaminophen] Rash       History of Present Illness   HPI    4/19/2020, 5:41 PM  The history is provided by the patient    Yolanda Betst is a 49 y.o. female presenting to the ED for blood sugar elevation, lightheadedness dizziness, shortness of breath.  Onset was approximately 2 weeks ago.  Patient has been tested outpatient for COVID- COVID negative on 17th.  She reports that she has been having multiple bouts of diarrhea for the last week and half.  No melena or hematochezia.  It was associated with 1 episode of emesis.  Patient reports she feels lightheaded and dizzy when she goes to standing position.  She also notes that she feels fatigued.  She also notes that she has been having heaviness on her chest.  Last 2-3 hours at a time in the been occurring daily.  Nothing brings it on.  Nothing makes it better or worse.  Patient denies any fevers, abdominal pain, dysuria, urgency, frequency, numbness or weakness to 1 side.      Arrival mode:  Personal Vehicle    PCP: CHARLA Beach MD     Allergies:  Review of patient's allergies  indicates:   Allergen Reactions    Codeine Itching    Ibuprofen     Neuromuscular blockers, steroidal Hives     some    Penicillins     Sulfamethoxazole-trimethoprim     Latex, natural rubber Rash    Morphine Rash     itching    Norco [hydrocodone-acetaminophen] Itching, Rash and Hallucinations    Seconal [secobarbital sodium] Rash     itching    Tylox [oxycodone-acetaminophen] Rash       Past Medical History:  Past Medical History:   Diagnosis Date    Abnormal Pap smear of cervix     HPV genital warts    Anemia     Anxiety     Arthritis     Asthma 10/11/2016    Bipolar 1 disorder     Diabetes mellitus, type 2     Dyslipidemia associated with type 2 diabetes mellitus 2019    Genital warts     GERD (gastroesophageal reflux disease)     Herpes simplex virus (HSV) infection     Hypertension     Hypertension complicating diabetes 2019    Migraine with aura and without status migrainosus, not intractable 3/21/2016    Mild persistent asthma without complication 10/11/2016    Morbid obesity with body mass index (BMI) of 45.0 to 49.9 in adult 8/3/2017    Obstructive sleep apnea     ALEN (obstructive sleep apnea)     2L per N/C q HS    Schizoaffective disorder, bipolar type 2019    Seasonal allergic rhinitis due to pollen 2019    Type 2 diabetes mellitus with hyperglycemia, with long-term current use of insulin 2017    Type 2 diabetes mellitus with hyperglycemia, without long-term current use of insulin 2017       Past Surgical History:  Past Surgical History:   Procedure Laterality Date    abcess removed right back      BELT ABDOMINOPLASTY      BREAST SURGERY Bilateral     Reduction     SECTION      X 2    COLONOSCOPY      COLONOSCOPY N/A 2018    Procedure: colonoscopy for iron deficiency anemia;  Surgeon: Frankie Sanchez MD;  Location: [facility];  Service: Endoscopy;  Laterality: N/A;    COLONOSCOPY N/A 2018    Procedure:  "COLONOSCOPY;  Surgeon: Frankie Sanchez MD;  Location: [facility] ENDO;  Service: Endoscopy;  Laterality: N/A;    HYSTERECTOMY      w/ BSO; hypermenorrhea    MOUTH SURGERY  1996    OOPHORECTOMY      hyst/bso, hypermenorrhea    ROBOT-ASSISTED CHOLECYSTECTOMY USING DA DARI XI N/A 1/3/2020    Procedure: XI ROBOTIC CHOLECYSTECTOMY;  Surgeon: Damir Driscoll MD;  Location: [facility] OR;  Service: General;  Laterality: N/A;    TUBAL LIGATION           Family History:  Family History   Problem Relation Age of Onset    Diabetes Mother     Hyperlipidemia Mother     Hypertension Mother     Asthma Mother     COPD Mother     Glaucoma Mother     Thyroid disease Mother     Anesthesia problems Mother         "almost had a cardiac arrest" , blood clots    Hypertension Father     Hyperlipidemia Father     Cancer Father         Brain, lung, liver, kidney    Heart disease Maternal Grandmother     Hyperlipidemia Maternal Grandmother     Hypertension Maternal Grandmother     Cataracts Maternal Grandmother     Diabetes Maternal Grandmother     Heart disease Maternal Grandfather     Hyperlipidemia Maternal Grandfather     Hypertension Maternal Grandfather     Glaucoma Maternal Grandfather     Cancer Maternal Grandfather     Cataracts Maternal Grandfather     Macular degeneration Maternal Grandfather     Diabetes Maternal Grandfather     Heart disease Paternal Grandmother     Hyperlipidemia Paternal Grandmother     Hypertension Paternal Grandmother     Cataracts Paternal Grandmother     Heart disease Paternal Grandfather     Hyperlipidemia Paternal Grandfather     Hypertension Paternal Grandfather     Cataracts Paternal Grandfather     Breast cancer Maternal Cousin     Breast cancer Maternal Cousin     Breast cancer Maternal Cousin     Breast cancer Maternal Cousin        Social History:  Social History     Tobacco Use    Smoking status: Never Smoker    Smokeless tobacco: Never Used   Substance and " Sexual Activity    Alcohol use: Yes     Alcohol/week: 0.0 standard drinks     Comment: socially  No alcohol 72h prior to sx    Drug use: No    Sexual activity: Not Currently     Partners: Male        Review of Systems   Review of Systems   Constitutional: Negative for fever.   HENT: Negative for sore throat.    Respiratory: Positive for shortness of breath. Negative for cough.    Cardiovascular: Negative for chest pain.   Gastrointestinal: Positive for diarrhea and vomiting (X1 episode). Negative for abdominal pain, anal bleeding and blood in stool.   Genitourinary: Negative for dysuria.   Musculoskeletal: Negative for back pain.   Skin: Negative for rash.   Neurological: Positive for light-headedness. Negative for facial asymmetry, speech difficulty, weakness and headaches.   Hematological: Does not bruise/bleed easily.      Physical Exam     Initial Vitals [04/19/20 1742]   BP Pulse Resp Temp SpO2   115/73 100 18 99 °F (37.2 °C) 95 %      MAP       --          Physical Exam    Nursing Notes and Vital Signs Reviewed.  Constitutional: Patient is in no apparent distress. Well-developed and well-nourished.  Head: Atraumatic. Normocephalic.  Eyes: PERRL. EOM intact. Conjunctivae are not pale. No scleral icterus.  ENT: Mucous membranes are moist. Oropharynx is clear and symmetric.    Neck: Supple. Full ROM. No lymphadenopathy.  Cardiovascular: Regular rate. Regular rhythm. No murmurs, rubs, or gallops. Distal pulses are 2+ and symmetric.  Pulmonary/Chest: No respiratory distress. Clear to auscultation bilaterally. No wheezing or rales.  Abdominal: Soft and non-distended.  There is no tenderness.  No rebound, guarding, or rigidity. Good bowel sounds.  Genitourinary: No CVA tenderness  Musculoskeletal: Moves all extremities. No obvious deformities. No edema. No calf tenderness.  Skin: Warm and dry.  Neurological:  Alert, awake, and appropriate.  Normal speech.  No acute focal neurological deficits are appreciated.   "Cranial nerves 2-12 intact.  GCS 15  Psychiatric: Normal affect. Good eye contact. Appropriate in content.     ED Course     Procedures    ED Vital Signs:  Vitals:    04/19/20 1742 04/19/20 1857 04/19/20 1932 04/19/20 2134   BP: 115/73   118/68   Pulse: 100  94 94   Resp: 18  (!) 22 20   Temp: 99 °F (37.2 °C)      TempSrc: Oral      SpO2: 95%  97% 98%   Weight:  94.8 kg (208 lb 15.9 oz)     Height: 4' 8" (1.422 m) 4' 10" (1.473 m)         Abnormal Lab Results:  Labs Reviewed   CBC W/ AUTO DIFFERENTIAL - Abnormal; Notable for the following components:       Result Value    RBC 3.79 (*)     Hemoglobin 11.0 (*)     Hematocrit 35.2 (*)     Mean Corpuscular Hemoglobin Conc 31.3 (*)     RDW 15.3 (*)     Immature Granulocytes 0.7 (*)     Immature Grans (Abs) 0.05 (*)     All other components within normal limits   COMPREHENSIVE METABOLIC PANEL - Abnormal; Notable for the following components:    CO2 20 (*)     Glucose 343 (*)     All other components within normal limits   URINALYSIS, REFLEX TO URINE CULTURE - Abnormal; Notable for the following components:    Glucose, UA 3+ (*)     All other components within normal limits    Narrative:     Preferred Collection Type->Urine, Clean Catch   POCT GLUCOSE - Abnormal; Notable for the following components:    POCT Glucose 331 (*)     All other components within normal limits   MAGNESIUM   TROPONIN I   SARS-COV-2 RNA AMPLIFICATION, QUAL    Narrative:     What symptom criteria does the patient meet?->Difficulty  breathing   URINALYSIS MICROSCOPIC    Narrative:     Preferred Collection Type->Urine, Clean Catch   POCT GLUCOSE MONITORING CONTINUOUS        All Lab Results:  Results for orders placed or performed during the hospital encounter of 04/19/20   CBC auto differential   Result Value Ref Range    WBC 7.39 3.90 - 12.70 K/uL    RBC 3.79 (L) 4.00 - 5.40 M/uL    Hemoglobin 11.0 (L) 12.0 - 16.0 g/dL    Hematocrit 35.2 (L) 37.0 - 48.5 %    Mean Corpuscular Volume 93 82 - 98 fL    " Mean Corpuscular Hemoglobin 29.0 27.0 - 31.0 pg    Mean Corpuscular Hemoglobin Conc 31.3 (L) 32.0 - 36.0 g/dL    RDW 15.3 (H) 11.5 - 14.5 %    Platelets 304 150 - 350 K/uL    MPV 9.8 9.2 - 12.9 fL    Immature Granulocytes 0.7 (H) 0.0 - 0.5 %    Gran # (ANC) 3.6 1.8 - 7.7 K/uL    Immature Grans (Abs) 0.05 (H) 0.00 - 0.04 K/uL    Lymph # 3.1 1.0 - 4.8 K/uL    Mono # 0.4 0.3 - 1.0 K/uL    Eos # 0.2 0.0 - 0.5 K/uL    Baso # 0.06 0.00 - 0.20 K/uL    nRBC 0 0 /100 WBC    Gran% 48.5 38.0 - 73.0 %    Lymph% 42.2 18.0 - 48.0 %    Mono% 5.8 4.0 - 15.0 %    Eosinophil% 2.0 0.0 - 8.0 %    Basophil% 0.8 0.0 - 1.9 %    Differential Method Automated    Comprehensive metabolic panel   Result Value Ref Range    Sodium 137 136 - 145 mmol/L    Potassium 5.0 3.5 - 5.1 mmol/L    Chloride 103 95 - 110 mmol/L    CO2 20 (L) 23 - 29 mmol/L    Glucose 343 (H) 70 - 110 mg/dL    BUN, Bld 14 6 - 20 mg/dL    Creatinine 0.8 0.5 - 1.4 mg/dL    Calcium 9.0 8.7 - 10.5 mg/dL    Total Protein 7.1 6.0 - 8.4 g/dL    Albumin 3.5 3.5 - 5.2 g/dL    Total Bilirubin 0.2 0.1 - 1.0 mg/dL    Alkaline Phosphatase 92 55 - 135 U/L    AST 25 10 - 40 U/L    ALT 29 10 - 44 U/L    Anion Gap 14 8 - 16 mmol/L    eGFR if African American >60 >60 mL/min/1.73 m^2    eGFR if non African American >60 >60 mL/min/1.73 m^2   Magnesium   Result Value Ref Range    Magnesium 1.7 1.6 - 2.6 mg/dL   Troponin I   Result Value Ref Range    Troponin I 0.007 0.000 - 0.026 ng/mL   COVID-19 Rapid Screening   Result Value Ref Range    SARS-CoV-2 RNA, Amplification, Qual Negative Negative   Urinalysis, Reflex to Urine Culture Urine, Clean Catch   Result Value Ref Range    Specimen UA Urine, Clean Catch     Color, UA Yellow Yellow, Straw, Milagros    Appearance, UA Clear Clear    pH, UA 6.0 5.0 - 8.0    Specific Gravity, UA 1.025 1.005 - 1.030    Protein, UA Negative Negative    Glucose, UA 3+ (A) Negative    Ketones, UA Negative Negative    Bilirubin (UA) Negative Negative    Occult Blood UA  Negative Negative    Nitrite, UA Negative Negative    Urobilinogen, UA Negative <2.0 EU/dL    Leukocytes, UA Negative Negative   Urinalysis Microscopic   Result Value Ref Range    WBC, UA 2 0 - 5 /hpf    Bacteria Occasional None-Occ /hpf    Yeast, UA None None    Squam Epithel, UA 10 /hpf    Microscopic Comment SEE COMMENT    POCT glucose   Result Value Ref Range    POCT Glucose 331 (H) 70 - 110 mg/dL         The EKG was ordered, reviewed, and independently interpreted by the ED provider.  Rate is 100 beats per minute.  Sinus tachycardia.  Normal axis.  No acute ST or T-wave changes appreciated.          Imaging Results:  Imaging Results          X-Ray Chest AP Portable (Final result)  Result time 04/19/20 19:20:44    Final result by Delon Mitchell MD (04/19/20 19:20:44)                 Impression:      No acute abnormality.      Electronically signed by: Delon Mitchell  Date:    04/19/2020  Time:    19:20             Narrative:    EXAMINATION:  XR CHEST AP PORTABLE    CLINICAL HISTORY:  . Dyspnea, unspecified    TECHNIQUE:  Single frontal portable view of the chest was performed.    COMPARISON:  04/11/2020    FINDINGS:  Support devices: None    Lung volumes are low.The lungs are clear, with normal appearance of pulmonary vasculature and no pleural effusion or pneumothorax.    The cardiac silhouette is normal in size. The hilar and mediastinal contours are unremarkable.    Bones are intact.                                 The Emergency Provider reviewed the vital signs and test results, which are outlined above.     ED Discussion         7:39 PM: Dr. Ronquillo transfers care of pt to Dr. Stinson pending lab results.      10:28 Reassessment: Dr. Stinson reassessed the pt.  Pt was given 2 units of fluid and states she is no longer short of breath. She notes that her insulin has recently been increased, but she also points out that she knows that she has been eating poorly recently. She states she will start eating better. Gave pt  "abx for UTI at this time.The pt is resting comfortably and is NAD.  Pt states their sx have improved. Discussed test results, shared treatment plan, specific conditions for return, and the need for f/u. Answered their questions at this time.  Pt understands and agrees to the plan.  The pt has remained hemodynamically stable through ED course and is stable for discharge.      I discussed with patient and/or family/caretaker that evaluation in the ED does not suggest any emergent or life threatening medical conditions requiring immediate intervention beyond what was provided in the ED, and I believe patient is safe for discharge.  Regardless, an unremarkable evaluation in the ED does not preclude the development or presence of a serious of life threatening condition. As such, patient was instructed to return immediately for any worsening or change in current symptoms.      ED Medication(s):  Medications   sodium chloride 0.9% bolus 1,000 mL (0 mLs Intravenous Stopped 4/19/20 1800)   albuterol-ipratropium 2.5 mg-0.5 mg/3 mL nebulizer solution 3 mL (3 mLs Nebulization Given 4/19/20 1932)   sodium chloride 0.9% bolus 1,000 mL (0 mLs Intravenous Stopped 4/19/20 2128)          Medication List      START taking these medications    nitrofurantoin (macrocrystal-monohydrate) 100 MG capsule  Commonly known as:  MACROBID  Take 1 capsule (100 mg total) by mouth 2 (two) times daily. for 5 days        ASK your doctor about these medications    ALPRAZolam 2 MG Tab  Commonly known as:  XANAX  Take 1 tablet (2 mg total) by mouth 2 (two) times daily.     amLODIPine 10 MG tablet  Commonly known as:  NORVASC  Take 1 tablet (10 mg total) by mouth once daily.     aspirin 81 MG EC tablet  Commonly known as:  ECOTRIN     atorvastatin 20 MG tablet  Commonly known as:  LIPITOR  Take 1 tablet (20 mg total) by mouth every evening.     BD INSULIN SYRINGE ULTRA-FINE 0.5 mL 30 gauge x 1/2" Syrg  Generic drug:  insulin syringe-needle U-100     BD " "ULTRA-FINE MINI PEN NEEDLE 31 gauge x 3/16" Ndle  Generic drug:  pen needle, diabetic  Use 5 a day     benztropine 1 MG tablet  Commonly known as:  COGENTIN  Take 1 tablet (1 mg total) by mouth every evening.     chlorproMAZINE 50 MG tablet  Commonly known as:  THORAZINE  Take 1 tablet (50 mg total) by mouth at bedtime     cyclobenzaprine 10 MG tablet  Commonly known as:  FLEXERIL  Take 1 tablet (10 mg total) by mouth 3 (three) times daily as needed for Muscle spasms.     DAILY MULTIPLE FOR WOMEN ORAL     DULoxetine 60 MG capsule  Commonly known as:  CYMBALTA  Take 1 capsule (60 mg total) by mouth 2 (two) times daily.     ergocalciferol 50,000 unit Cap  Commonly known as:  ERGOCALCIFEROL  Take 1 capsule (50,000 Units total) by mouth every 14 (fourteen) days.     fish oil-omega-3 fatty acids 300-1,000 mg capsule     fluticasone propionate 50 mcg/actuation nasal spray  Commonly known as:  FLONASE  2 sprays (100 mcg total) by Each Nare route once daily.     furosemide 20 MG tablet  Commonly known as:  LASIX  Take 1 tablet (20 mg total) by mouth once daily.     gabapentin 300 MG capsule  Commonly known as:  NEURONTIN  Take 2 capsules (600 mg total) by mouth 3 (three) times daily.     hydrALAZINE 10 MG tablet  Commonly known as:  APRESOLINE  Take 1 tablet (10 mg total) by mouth every 12 (twelve) hours.     LINZESS 145 mcg Cap capsule  Generic drug:  linaCLOtide  Take 1 capsule (145 mcg total) by mouth daily as needed (for constipation).     lurasidone 120 mg Tab  Take 1 tablet (120 mg total) by mouth once daily at 5 pm.     magnesium oxide 400 mg (241.3 mg magnesium) tablet  Commonly known as:  MAG-OX  Take 1 tablet (400 mg total) by mouth once daily.     meclizine 25 mg tablet  Commonly known as:  ANTIVERT  Take 1 tablet by mouth every day as needed until directed to stop.     metFORMIN 1000 MG tablet  Commonly known as:  GLUCOPHAGE  TAKE 1 TABLET (1,000 MG TOTAL) BY MOUTH 2 (TWO) TIMES DAILY.     metoprolol succinate " 100 MG 24 hr tablet  Commonly known as:  TOPROL-XL  Take 2 tablets (200 mg total) by mouth every evening.     mirtazapine 45 MG tablet  Commonly known as:  REMERON  Take 1 tablet (45 mg total) by mouth every evening.     mometasone 220 mcg/ actuation (120) Aepb  Commonly known as:  ASMANEX TWISTHALER  Inhale 2 puffs into the lungs 2 (two) times daily. Controller     NovoLOG Flexpen U-100 Insulin 100 unit/mL (3 mL) Inpn pen  Generic drug:  insulin aspart U-100  Inject before meals three times a day up to 30 units daily     RESTASIS 0.05 % ophthalmic emulsion  Generic drug:  cycloSPORINE  Place 0.4 mLs (1 drop total) into both eyes 2 (two) times daily.     spironolactone 25 MG tablet  Commonly known as:  ALDACTONE  Take 1 tablet (25 mg total) by mouth once daily.     topiramate 50 MG tablet  Commonly known as:  TOPAMAX  Take 1 tablet (50 mg total) by mouth 2 (two) times daily.     * TOUJEO SOLOSTAR U-300 INSULIN 300 unit/mL (1.5 mL) Inpn pen  Generic drug:  insulin glargine (TOUJEO)  Inject 80 Units into the skin every evening. Needs 9 pens for 1 month so pharmacy please give adequate amount  and call office for questions     * TOUJEO MAX U-300 SOLOSTAR 300 unit/mL (3 mL) Inpn  Generic drug:  insulin glargine U-300 conc  Inject 90 units once daily at bedtime     triazolam 0.25 MG Tab  Commonly known as:  HALCION  Take 1 tablet by mouth at bedtime     TRUE METRIX GLUCOSE METER Misc  Generic drug:  blood-glucose meter  Use to check blood sugars, give meter based on insurance specification     TRUE METRIX GLUCOSE TEST STRIP Strp  Generic drug:  blood sugar diagnostic  Check blood glucose 4 times daily as directed and as needed (dispense insurance preferred brand or patient choice)     TRUEPLUS LANCETS 28 gauge Misc  Generic drug:  lancets  use as directed 3 times daily     valACYclovir 1000 MG tablet  Commonly known as:  VALTREX  Take 1 tablet (1,000 mg total) by mouth once daily.     valsartan 320 MG tablet  Commonly  "known as:  DIOVAN  Take 1 tablet (320 mg total) by mouth once daily.     VICTOZA 3-MILENA 0.6 mg/0.1 mL (18 mg/3 mL) Pnij  Generic drug:  liraglutide 0.6 mg/0.1 mL (18 mg/3 mL) subq PNIJ  Use 1.8mg under the skin daily         * This list has 2 medication(s) that are the same as other medications prescribed for you. Read the directions carefully, and ask your doctor or other care provider to review them with you.               Where to Get Your Medications      You can get these medications from any pharmacy    Bring a paper prescription for each of these medications  · nitrofurantoin (macrocrystal-monohydrate) 100 MG capsule         Follow-up Information     L Ady Beach MD In 2 days.    Specialty:  Family Medicine  Contact information:  [address] [place] [address] [phone]                        MIPS Measures     Smoker? No     Hypertension: History of Hypertension: The patient has elevated blood pressure (higher than 120/80) while being treated in the ED but has a history of hypertension.         Medical Decision Making     Medical Decision Making:   Independently Interpreted Test(s):   I have ordered and independently interpreted EKG Reading(s) - see prior notes  Clinical Tests:   Lab Tests: Ordered and Reviewed  Radiological Study: Ordered and Reviewed  Medical Tests: Ordered and Reviewed             MDM  Reviewed: vitals and nursing note          Portions of this note may have been created with voice recognition software. Occasional "wrong-word" or "sound-a-like" substitutions may have occurred due to the inherent limitations of voice recognition software. Please, read the note carefully and recognize, using context, where substitutions have occurred.         National State of Emergency Declared secondary to COVID-19.    Scribe Attestation:   Scribe #1: I performed the above scribed service and the documentation accurately describes the services I performed. I attest to the accuracy of " the note.     Attending:   Physician Attestation Statement for Scribe #1: I, Maile Hines Do, MD, personally performed the services described in this documentation, as scribed by Caitlyn Espinosa, in my presence, and it is both accurate and complete.     Clinical Impression       ICD-10-CM ICD-9-CM   1. COPD with acute exacerbation J44.1 491.21   2. Dyspnea R06.00 786.09   3. Urinary tract infection without hematuria, site unspecified N39.0 599.0   4. Hyperglycemia R73.9 790.29       Disposition:   Disposition: Discharged  Condition: Stable             Maile Hines Do, MD  04/19/20 7925

## 2020-04-20 ENCOUNTER — TELEPHONE (OUTPATIENT)
Dept: PULMONOLOGY | Facility: CLINIC | Age: 50
End: 2020-04-20

## 2020-04-20 ENCOUNTER — TELEPHONE (OUTPATIENT)
Dept: INTERNAL MEDICINE | Facility: CLINIC | Age: 50
End: 2020-04-20

## 2020-04-20 DIAGNOSIS — M79.7 FIBROMYALGIA: Chronic | ICD-10-CM

## 2020-04-20 DIAGNOSIS — J45.41 MODERATE PERSISTENT ASTHMA WITH ACUTE EXACERBATION: ICD-10-CM

## 2020-04-20 DIAGNOSIS — J20.9 BRONCHITIS WITH BRONCHOSPASM: ICD-10-CM

## 2020-04-20 NOTE — TELEPHONE ENCOUNTER
----- Message from Josselyn Stringer sent at 4/20/2020  9:00 AM CDT -----  Contact: Patient  Patient states that she has a UTI and would like to be advised, please call her back at [phone]. Thank you

## 2020-04-20 NOTE — TELEPHONE ENCOUNTER
Spoke with patient and scheduled her a hospital follow up visit per Dr. Martinez on 4/22/20 at 11:00. She verbalized understanding.

## 2020-04-20 NOTE — TELEPHONE ENCOUNTER
----- Message from Josselyn Stringer sent at 4/20/2020  8:59 AM CDT -----  Contact: Pateint  Patient states that she needs to speak to nurse regarding a hospital follow up for SOB, COPD, please call her back at [phone]. Thank you

## 2020-04-20 NOTE — TELEPHONE ENCOUNTER
----- Message from Ceci Lala sent at 4/20/2020  2:37 PM CDT -----  Contact: Pt  Pt is requesting call back in regards to experiencing shortness of breath and would like to know if she can get refill on NEB treatment          Pls call back at [phone]

## 2020-04-21 ENCOUNTER — PATIENT OUTREACH (OUTPATIENT)
Dept: ADMINISTRATIVE | Facility: OTHER | Age: 50
End: 2020-04-21

## 2020-04-21 RX ORDER — GABAPENTIN 300 MG/1
600 CAPSULE ORAL 3 TIMES DAILY
Qty: 90 CAPSULE | Refills: 3 | Status: SHIPPED | OUTPATIENT
Start: 2020-04-21 | End: 2020-09-07 | Stop reason: SDUPTHER

## 2020-04-21 RX ORDER — ALBUTEROL SULFATE 0.83 MG/ML
2.5 SOLUTION RESPIRATORY (INHALATION) EVERY 4 HOURS
Qty: 150 ML | Refills: 11 | Status: SHIPPED | OUTPATIENT
Start: 2020-04-21 | End: 2020-04-24

## 2020-04-22 ENCOUNTER — OFFICE VISIT (OUTPATIENT)
Dept: CARDIOLOGY | Facility: CLINIC | Age: 50
End: 2020-04-22
Payer: MEDICAID

## 2020-04-22 VITALS — DIASTOLIC BLOOD PRESSURE: 62 MMHG | HEART RATE: 101 BPM | SYSTOLIC BLOOD PRESSURE: 115 MMHG

## 2020-04-22 DIAGNOSIS — E66.01 MORBID OBESITY WITH BMI OF 40.0-44.9, ADULT: Chronic | ICD-10-CM

## 2020-04-22 DIAGNOSIS — G43.109 MIGRAINE WITH AURA AND WITHOUT STATUS MIGRAINOSUS, NOT INTRACTABLE: Chronic | ICD-10-CM

## 2020-04-22 DIAGNOSIS — G47.36 NOCTURNAL HYPOXEMIA DUE TO OBESITY: Chronic | ICD-10-CM

## 2020-04-22 DIAGNOSIS — Z91.199 NONCOMPLIANCE: Chronic | ICD-10-CM

## 2020-04-22 DIAGNOSIS — F31.9 BIPOLAR 1 DISORDER: Chronic | ICD-10-CM

## 2020-04-22 DIAGNOSIS — E11.59 HYPERTENSION COMPLICATING DIABETES: Primary | Chronic | ICD-10-CM

## 2020-04-22 DIAGNOSIS — I15.2 HYPERTENSION COMPLICATING DIABETES: Primary | Chronic | ICD-10-CM

## 2020-04-22 DIAGNOSIS — E11.65 TYPE 2 DIABETES MELLITUS WITH HYPERGLYCEMIA, WITH LONG-TERM CURRENT USE OF INSULIN: Chronic | ICD-10-CM

## 2020-04-22 DIAGNOSIS — E66.9 NOCTURNAL HYPOXEMIA DUE TO OBESITY: Chronic | ICD-10-CM

## 2020-04-22 DIAGNOSIS — E11.69 DYSLIPIDEMIA ASSOCIATED WITH TYPE 2 DIABETES MELLITUS: Chronic | ICD-10-CM

## 2020-04-22 DIAGNOSIS — F25.0 SCHIZOAFFECTIVE DISORDER, BIPOLAR TYPE: Chronic | ICD-10-CM

## 2020-04-22 DIAGNOSIS — Z79.4 TYPE 2 DIABETES MELLITUS WITH HYPERGLYCEMIA, WITH LONG-TERM CURRENT USE OF INSULIN: Chronic | ICD-10-CM

## 2020-04-22 DIAGNOSIS — E78.5 DYSLIPIDEMIA ASSOCIATED WITH TYPE 2 DIABETES MELLITUS: Chronic | ICD-10-CM

## 2020-04-22 DIAGNOSIS — E78.1 HYPERTRIGLYCERIDEMIA: Chronic | ICD-10-CM

## 2020-04-22 DIAGNOSIS — R94.31 ABNORMAL ECG: ICD-10-CM

## 2020-04-22 DIAGNOSIS — E11.42 DIABETIC POLYNEUROPATHY ASSOCIATED WITH TYPE 2 DIABETES MELLITUS: Chronic | ICD-10-CM

## 2020-04-22 PROCEDURE — 99442 PR PHYSICIAN TELEPHONE EVALUATION 11-20 MIN: ICD-10-PCS | Mod: 95,,, | Performed by: INTERNAL MEDICINE

## 2020-04-22 PROCEDURE — 99442 PR PHYSICIAN TELEPHONE EVALUATION 11-20 MIN: CPT | Mod: 95,,, | Performed by: INTERNAL MEDICINE

## 2020-04-22 RX ORDER — AMLODIPINE BESYLATE 5 MG/1
5 TABLET ORAL NIGHTLY
Qty: 30 TABLET | Refills: 11 | Status: SHIPPED | OUTPATIENT
Start: 2020-04-22 | End: 2020-06-25

## 2020-04-22 RX ORDER — AMLODIPINE BESYLATE 10 MG/1
10 TABLET ORAL DAILY
Qty: 30 TABLET | Refills: 3 | Status: SHIPPED | OUTPATIENT
Start: 2020-04-22 | End: 2020-10-05 | Stop reason: SDUPTHER

## 2020-04-22 NOTE — TELEPHONE ENCOUNTER
Please enroll the patient in the patient portal through [employer] [facility].  Please schedule a telemedicine or telephone visit for further evaluation

## 2020-04-22 NOTE — PROGRESS NOTES
Subjective:   Patient ID:  Yolanda Betts is a 49 y.o. female who presents for follow up of No chief complaint on file.    The patient location is: home due to covid 19  The chief complaint leading to consultation is: htn  Visit type: audio only  Total time spent with patient: 12 minutes  Each patient to whom he or she provides medical services by telemedicine is:  (1) informed of the relationship between the physician and patient and the respective role of any other health care provider with respect to management of the patient; and (2) notified that he or she may decline to receive medical services by telemedicine and may withdraw from such care at any time.    Notes:   HPI  A 50 yo female with htn diabetes obesity o2 desaturation nocturnal o2 therapy migraine asthma is following on htn. Her bp is improved she is more complaint with slat diabetes and meds intake. The dose adjustment has helped with the bp buy taking extra 5 mg of amlodipine.  Has uti and had shortness of breath necessitating an er visits. Feels much better. Has  Less  shortness of breath she got hydrated. Has no leg edema her nebs has helped control symptoms has no other issues clinically.htn better control no fluctuation hr improved.   Past Medical History:   Diagnosis Date    Abnormal Pap smear of cervix     HPV genital warts    Anemia     Anxiety     Arthritis     Asthma 10/11/2016    Bipolar 1 disorder     Diabetes mellitus, type 2     Dyslipidemia associated with type 2 diabetes mellitus 5/13/2019    Genital warts     GERD (gastroesophageal reflux disease)     Herpes simplex virus (HSV) infection     Hypertension     Hypertension complicating diabetes 5/5/2019    Migraine with aura and without status migrainosus, not intractable 3/21/2016    Mild persistent asthma without complication 10/11/2016    Morbid obesity with body mass index (BMI) of 45.0 to 49.9 in adult 8/3/2017    Obstructive sleep apnea     ALEN (obstructive  "sleep apnea)     2L per N/C q HS    Schizoaffective disorder, bipolar type 2019    Seasonal allergic rhinitis due to pollen 2019    Type 2 diabetes mellitus with hyperglycemia, with long-term current use of insulin 2017    Type 2 diabetes mellitus with hyperglycemia, without long-term current use of insulin 2017       Past Surgical History:   Procedure Laterality Date    abcess removed right back      BELT ABDOMINOPLASTY      BREAST SURGERY Bilateral 1996    Reduction     SECTION      X 2    COLONOSCOPY      COLONOSCOPY N/A 2018    Procedure: colonoscopy for iron deficiency anemia;  Surgeon: Frankie Sanchez MD;  Location: [facility] ENDO;  Service: Endoscopy;  Laterality: N/A;    COLONOSCOPY N/A 2018    Procedure: COLONOSCOPY;  Surgeon: Frankie Sanchez MD;  Location: [facility] ENDO;  Service: Endoscopy;  Laterality: N/A;    HYSTERECTOMY      w/ BSO; hypermenorrhea    MOUTH SURGERY      OOPHORECTOMY      hyst/bso, hypermenorrhea    ROBOT-ASSISTED CHOLECYSTECTOMY USING DA DARI XI N/A 1/3/2020    Procedure: XI ROBOTIC CHOLECYSTECTOMY;  Surgeon: Damir Driscoll MD;  Location: [facility] OR;  Service: General;  Laterality: N/A;    TUBAL LIGATION         Social History     Tobacco Use    Smoking status: Never Smoker    Smokeless tobacco: Never Used   Substance Use Topics    Alcohol use: Yes     Alcohol/week: 0.0 standard drinks     Comment: socially  No alcohol 72h prior to sx    Drug use: No       Family History   Problem Relation Age of Onset    Diabetes Mother     Hyperlipidemia Mother     Hypertension Mother     Asthma Mother     COPD Mother     Glaucoma Mother     Thyroid disease Mother     Anesthesia problems Mother         "almost had a cardiac arrest" , blood clots    Hypertension Father     Hyperlipidemia Father     Cancer Father         Brain, lung, liver, kidney    Heart disease Maternal Grandmother     Hyperlipidemia Maternal " "Grandmother     Hypertension Maternal Grandmother     Cataracts Maternal Grandmother     Diabetes Maternal Grandmother     Heart disease Maternal Grandfather     Hyperlipidemia Maternal Grandfather     Hypertension Maternal Grandfather     Glaucoma Maternal Grandfather     Cancer Maternal Grandfather     Cataracts Maternal Grandfather     Macular degeneration Maternal Grandfather     Diabetes Maternal Grandfather     Heart disease Paternal Grandmother     Hyperlipidemia Paternal Grandmother     Hypertension Paternal Grandmother     Cataracts Paternal Grandmother     Heart disease Paternal Grandfather     Hyperlipidemia Paternal Grandfather     Hypertension Paternal Grandfather     Cataracts Paternal Grandfather     Breast cancer Maternal Cousin     Breast cancer Maternal Cousin     Breast cancer Maternal Cousin     Breast cancer Maternal Cousin        Current Outpatient Medications   Medication Sig    albuterol (PROVENTIL) 2.5 mg /3 mL (0.083 %) nebulizer solution Take 3 mLs (2.5 mg total) by nebulization every 4 (four) hours.    ALPRAZolam (XANAX) 2 MG Tab Take 1 tablet (2 mg total) by mouth 2 (two) times daily.    amLODIPine (NORVASC) 10 MG tablet Take 1 tablet (10 mg total) by mouth once daily.    aspirin (ECOTRIN) 81 MG EC tablet Take 81 mg by mouth once daily.    atorvastatin (LIPITOR) 20 MG tablet Take 1 tablet (20 mg total) by mouth every evening.    BD INSULIN SYRINGE ULTRA-FINE 1/2 mL 30 gauge x 1/2" Syrg     benztropine (COGENTIN) 1 MG tablet Take 1 tablet (1 mg total) by mouth every evening.    blood sugar diagnostic Strp Check blood glucose 4 times daily as directed and as needed (dispense insurance preferred brand or patient choice)    blood-glucose meter Misc Use to check blood sugars, give meter based on insurance specification    chlorproMAZINE (THORAZINE) 50 MG tablet Take 1 tablet (50 mg total) by mouth at bedtime    cyclobenzaprine (FLEXERIL) 10 MG tablet Take 1 " tablet (10 mg total) by mouth 3 (three) times daily as needed for Muscle spasms.    cycloSPORINE (RESTASIS) 0.05 % ophthalmic emulsion Place 0.4 mLs (1 drop total) into both eyes 2 (two) times daily.    DULoxetine (CYMBALTA) 60 MG capsule Take 1 capsule (60 mg total) by mouth 2 (two) times daily.    ergocalciferol (ERGOCALCIFEROL) 50,000 unit Cap Take 1 capsule (50,000 Units total) by mouth every 14 (fourteen) days.    fish oil-omega-3 fatty acids 300-1,000 mg capsule Take 1 capsule by mouth once daily.    fluticasone propionate (FLONASE) 50 mcg/actuation nasal spray 2 sprays (100 mcg total) by Each Nare route once daily. (Patient taking differently: 2 sprays by Each Nostril route daily as needed. )    furosemide (LASIX) 20 MG tablet Take 1 tablet (20 mg total) by mouth once daily.    gabapentin (NEURONTIN) 300 MG capsule Take 2 capsules (600 mg total) by mouth 3 (three) times daily.    hydrALAZINE (APRESOLINE) 10 MG tablet Take 1 tablet (10 mg total) by mouth every 12 (twelve) hours.    insulin aspart U-100 (NOVOLOG FLEXPEN U-100 INSULIN) 100 unit/mL (3 mL) InPn pen Inject before meals three times a day up to 30 units daily (Patient taking differently: Inject 20 Units into the skin 3 (three) times daily with meals. Inject before meals three times a day up to 30 units daily)    lancets 28 gauge Misc use as directed 3 times daily    linaCLOtide (LINZESS) 145 mcg Cap capsule Take 1 capsule (145 mcg total) by mouth daily as needed (for constipation).    liraglutide 0.6 mg/0.1 mL, 18 mg/3 mL, subq PNIJ (VICTOZA 2-MILENA) 0.6 mg/0.1 mL (18 mg/3 mL) PnIj Use 1.8mg under the skin daily (Patient taking differently: Inject 1.8 mg into the skin once daily. Use 1.8mg under the skin daily)    lurasidone 120 mg Tab Take 1 tablet (120 mg total) by mouth once daily at 5 pm.    magnesium oxide (MAG-OX) 400 mg (241.3 mg magnesium) tablet Take 1 tablet (400 mg total) by mouth once daily.    meclizine (ANTIVERT) 25 mg  "tablet Take 1 tablet by mouth every day as needed until directed to stop.    metFORMIN (GLUCOPHAGE) 1000 MG tablet TAKE 1 TABLET (1,000 MG TOTAL) BY MOUTH 2 (TWO) TIMES DAILY.    metoprolol succinate (TOPROL-XL) 100 MG 24 hr tablet Take 2 tablets (200 mg total) by mouth every evening.    mirtazapine (REMERON) 45 MG tablet Take 1 tablet (45 mg total) by mouth every evening.    mometasone (ASMANEX TWISTHALER) 220 mcg (120 doses) AePB Inhale 2 puffs into the lungs 2 (two) times daily. Controller    MULTIVIT,CALC,MINS/IRON/FOLIC (DAILY MULTIPLE FOR WOMEN ORAL) Take 1 tablet by mouth once daily.    nitrofurantoin, macrocrystal-monohydrate, (MACROBID) 100 MG capsule Take 1 capsule (100 mg total) by mouth 2 (two) times daily. for 5 days    pen needle, diabetic (BD ULTRA-FINE MINI PEN NEEDLE) 31 gauge x 3/16" Ndle Use 5 a day    spironolactone (ALDACTONE) 25 MG tablet Take 1 tablet (25 mg total) by mouth once daily.    topiramate (TOPAMAX) 50 MG tablet Take 1 tablet (50 mg total) by mouth 2 (two) times daily.    TOUJEO MAX U-300 SOLOSTAR 300 unit/mL (3 mL) InPn Inject 90 units once daily at bedtime    TOUJEO SOLOSTAR U-300 INSULIN 300 unit/mL (1.5 mL) InPn pen Inject 80 Units into the skin every evening. Needs 9 pens for 1 month so pharmacy please give adequate amount  and call office for questions    triazolam (HALCION) 0.25 MG Tab Take 1 tablet by mouth at bedtime    valACYclovir (VALTREX) 1000 MG tablet Take 1 tablet (1,000 mg total) by mouth once daily.    valsartan (DIOVAN) 320 MG tablet Take 1 tablet (320 mg total) by mouth once daily.     No current facility-administered medications for this visit.      Current Outpatient Medications on File Prior to Visit   Medication Sig    albuterol (PROVENTIL) 2.5 mg /3 mL (0.083 %) nebulizer solution Take 3 mLs (2.5 mg total) by nebulization every 4 (four) hours.    ALPRAZolam (XANAX) 2 MG Tab Take 1 tablet (2 mg total) by mouth 2 (two) times daily.    " "amLODIPine (NORVASC) 10 MG tablet Take 1 tablet (10 mg total) by mouth once daily.    aspirin (ECOTRIN) 81 MG EC tablet Take 81 mg by mouth once daily.    atorvastatin (LIPITOR) 20 MG tablet Take 1 tablet (20 mg total) by mouth every evening.    BD INSULIN SYRINGE ULTRA-FINE 1/2 mL 30 gauge x 1/2" Syrg     benztropine (COGENTIN) 1 MG tablet Take 1 tablet (1 mg total) by mouth every evening.    blood sugar diagnostic Strp Check blood glucose 4 times daily as directed and as needed (dispense insurance preferred brand or patient choice)    blood-glucose meter Misc Use to check blood sugars, give meter based on insurance specification    chlorproMAZINE (THORAZINE) 50 MG tablet Take 1 tablet (50 mg total) by mouth at bedtime    cyclobenzaprine (FLEXERIL) 10 MG tablet Take 1 tablet (10 mg total) by mouth 3 (three) times daily as needed for Muscle spasms.    cycloSPORINE (RESTASIS) 0.05 % ophthalmic emulsion Place 0.4 mLs (1 drop total) into both eyes 2 (two) times daily.    DULoxetine (CYMBALTA) 60 MG capsule Take 1 capsule (60 mg total) by mouth 2 (two) times daily.    ergocalciferol (ERGOCALCIFEROL) 50,000 unit Cap Take 1 capsule (50,000 Units total) by mouth every 14 (fourteen) days.    fish oil-omega-3 fatty acids 300-1,000 mg capsule Take 1 capsule by mouth once daily.    fluticasone propionate (FLONASE) 50 mcg/actuation nasal spray 2 sprays (100 mcg total) by Each Nare route once daily. (Patient taking differently: 2 sprays by Each Nostril route daily as needed. )    furosemide (LASIX) 20 MG tablet Take 1 tablet (20 mg total) by mouth once daily.    gabapentin (NEURONTIN) 300 MG capsule Take 2 capsules (600 mg total) by mouth 3 (three) times daily.    hydrALAZINE (APRESOLINE) 10 MG tablet Take 1 tablet (10 mg total) by mouth every 12 (twelve) hours.    insulin aspart U-100 (NOVOLOG FLEXPEN U-100 INSULIN) 100 unit/mL (3 mL) InPn pen Inject before meals three times a day up to 30 units daily (Patient " "taking differently: Inject 20 Units into the skin 3 (three) times daily with meals. Inject before meals three times a day up to 30 units daily)    lancets 28 gauge Misc use as directed 3 times daily    linaCLOtide (LINZESS) 145 mcg Cap capsule Take 1 capsule (145 mcg total) by mouth daily as needed (for constipation).    liraglutide 0.6 mg/0.1 mL, 18 mg/3 mL, subq PNIJ (VICTOZA 2-MILENA) 0.6 mg/0.1 mL (18 mg/3 mL) PnIj Use 1.8mg under the skin daily (Patient taking differently: Inject 1.8 mg into the skin once daily. Use 1.8mg under the skin daily)    lurasidone 120 mg Tab Take 1 tablet (120 mg total) by mouth once daily at 5 pm.    magnesium oxide (MAG-OX) 400 mg (241.3 mg magnesium) tablet Take 1 tablet (400 mg total) by mouth once daily.    meclizine (ANTIVERT) 25 mg tablet Take 1 tablet by mouth every day as needed until directed to stop.    metFORMIN (GLUCOPHAGE) 1000 MG tablet TAKE 1 TABLET (1,000 MG TOTAL) BY MOUTH 2 (TWO) TIMES DAILY.    metoprolol succinate (TOPROL-XL) 100 MG 24 hr tablet Take 2 tablets (200 mg total) by mouth every evening.    mirtazapine (REMERON) 45 MG tablet Take 1 tablet (45 mg total) by mouth every evening.    mometasone (ASMANEX TWISTHALER) 220 mcg (120 doses) AePB Inhale 2 puffs into the lungs 2 (two) times daily. Controller    MULTIVIT,CALC,MINS/IRON/FOLIC (DAILY MULTIPLE FOR WOMEN ORAL) Take 1 tablet by mouth once daily.    nitrofurantoin, macrocrystal-monohydrate, (MACROBID) 100 MG capsule Take 1 capsule (100 mg total) by mouth 2 (two) times daily. for 5 days    pen needle, diabetic (BD ULTRA-FINE MINI PEN NEEDLE) 31 gauge x 3/16" Ndle Use 5 a day    spironolactone (ALDACTONE) 25 MG tablet Take 1 tablet (25 mg total) by mouth once daily.    topiramate (TOPAMAX) 50 MG tablet Take 1 tablet (50 mg total) by mouth 2 (two) times daily.    TOUJEO MAX U-300 SOLOSTAR 300 unit/mL (3 mL) InPn Inject 90 units once daily at bedtime    TOUJEO SOLOSTAR U-300 INSULIN 300 unit/mL " (1.5 mL) InPn pen Inject 80 Units into the skin every evening. Needs 9 pens for 1 month so pharmacy please give adequate amount  and call office for questions    triazolam (HALCION) 0.25 MG Tab Take 1 tablet by mouth at bedtime    valACYclovir (VALTREX) 1000 MG tablet Take 1 tablet (1,000 mg total) by mouth once daily.    valsartan (DIOVAN) 320 MG tablet Take 1 tablet (320 mg total) by mouth once daily.     No current facility-administered medications on file prior to visit.      Review of patient's allergies indicates:   Allergen Reactions    Codeine Itching    Ibuprofen     Neuromuscular blockers, steroidal Hives     some    Penicillins     Sulfamethoxazole-trimethoprim     Latex, natural rubber Rash    Morphine Rash     itching    Norco [hydrocodone-acetaminophen] Itching, Rash and Hallucinations    Seconal [secobarbital sodium] Rash     itching    Tylox [oxycodone-acetaminophen] Rash     Review of Systems   Constitution: Negative for diaphoresis, malaise/fatigue and weight gain.   HENT: Negative for hoarse voice.    Eyes: Negative for double vision and visual disturbance.   Cardiovascular: Negative for chest pain, claudication, cyanosis, dyspnea on exertion, irregular heartbeat, leg swelling, near-syncope, orthopnea, palpitations, paroxysmal nocturnal dyspnea and syncope.   Respiratory: Positive for shortness of breath. Negative for cough, hemoptysis and snoring.    Hematologic/Lymphatic: Negative for bleeding problem. Does not bruise/bleed easily.   Skin: Negative for color change and poor wound healing.   Musculoskeletal: Negative for muscle cramps, muscle weakness and myalgias.   Gastrointestinal: Negative for bloating, abdominal pain, change in bowel habit, diarrhea, heartburn, hematemesis, hematochezia, melena and nausea.   Neurological: Positive for headaches. Negative for excessive daytime sleepiness, dizziness, light-headedness, loss of balance, numbness and weakness.    Psychiatric/Behavioral: Negative for memory loss. The patient does not have insomnia.    Allergic/Immunologic: Negative for hives.       Objective:   Physical Exam  Vitals:    04/22/20 1434   BP: 115/62   Pulse: 101     Lab Results   Component Value Date    CHOL 126 02/18/2020    CHOL 142 04/25/2019    CHOL 102 (L) 12/22/2017     Lab Results   Component Value Date    HDL 46 02/18/2020    HDL 36 (L) 04/25/2019    HDL 43 12/22/2017     Lab Results   Component Value Date    LDLCALC 49.6 (L) 02/18/2020    LDLCALC 40.4 (L) 04/25/2019    LDLCALC 33.6 (L) 12/22/2017     Lab Results   Component Value Date    TRIG 152 (H) 02/18/2020    TRIG 328 (H) 04/25/2019    TRIG 127 12/22/2017     Lab Results   Component Value Date    CHOLHDL 36.5 02/18/2020    CHOLHDL 25.4 04/25/2019    CHOLHDL 42.2 12/22/2017       Chemistry        Component Value Date/Time     04/19/2020 1845    K 5.0 04/19/2020 1845     04/19/2020 1845    CO2 20 (L) 04/19/2020 1845    BUN 14 04/19/2020 1845    CREATININE 0.8 04/19/2020 1845     (H) 04/19/2020 1845        Component Value Date/Time    CALCIUM 9.0 04/19/2020 1845    ALKPHOS 92 04/19/2020 1845    AST 25 04/19/2020 1845    ALT 29 04/19/2020 1845    BILITOT 0.2 04/19/2020 1845    ESTGFRAFRICA >60 04/19/2020 1845    EGFRNONAA >60 04/19/2020 1845          Lab Results   Component Value Date    TSH 1.855 12/22/2017     Lab Results   Component Value Date    INR 0.9 11/23/2016     Lab Results   Component Value Date    WBC 7.39 04/19/2020    HGB 11.0 (L) 04/19/2020    HCT 35.2 (L) 04/19/2020    MCV 93 04/19/2020     04/19/2020     BMP  Sodium   Date Value Ref Range Status   04/19/2020 137 136 - 145 mmol/L Final     Potassium   Date Value Ref Range Status   04/19/2020 5.0 3.5 - 5.1 mmol/L Final     Chloride   Date Value Ref Range Status   04/19/2020 103 95 - 110 mmol/L Final     CO2   Date Value Ref Range Status   04/19/2020 20 (L) 23 - 29 mmol/L Final     BUN, Bld   Date Value Ref  Range Status   04/19/2020 14 6 - 20 mg/dL Final     Creatinine   Date Value Ref Range Status   04/19/2020 0.8 0.5 - 1.4 mg/dL Final     Calcium   Date Value Ref Range Status   04/19/2020 9.0 8.7 - 10.5 mg/dL Final     Anion Gap   Date Value Ref Range Status   04/19/2020 14 8 - 16 mmol/L Final     eGFR if    Date Value Ref Range Status   04/19/2020 >60 >60 mL/min/1.73 m^2 Final     eGFR if non    Date Value Ref Range Status   04/19/2020 >60 >60 mL/min/1.73 m^2 Final     Comment:     Calculation used to obtain the estimated glomerular filtration  rate (eGFR) is the CKD-EPI equation.        CrCl cannot be calculated (Unknown ideal weight.).    Assessment:     1. Hypertension complicating diabetes    2. Migraine with aura and without status migrainosus, not intractable    3. Diabetic polyneuropathy associated with type 2 diabetes mellitus    4. Bipolar 1 disorder    5. Hypertriglyceridemia    6. Type 2 diabetes mellitus with hyperglycemia, with long-term current use of insulin    7. Schizoaffective disorder, bipolar type    8. Dyslipidemia associated with type 2 diabetes mellitus    9. Nocturnal hypoxemia due to obesity - WITHOUT ALEN    10. Morbid obesity with BMI of 40.0-44.9, adult    11. High risk of appointment cancellation or no-show    12. Abnormal ECG      htn controlled  uti resolving  Shortness of breath improving.    Plan:   Amlodipine 10 mg in am 5 mg in pm  Low salt diet  Stay well hydrated  diabetes control   exercise weight loss

## 2020-04-24 ENCOUNTER — TELEPHONE (OUTPATIENT)
Dept: PULMONOLOGY | Facility: CLINIC | Age: 50
End: 2020-04-24

## 2020-04-24 ENCOUNTER — OFFICE VISIT (OUTPATIENT)
Dept: PULMONOLOGY | Facility: CLINIC | Age: 50
End: 2020-04-24
Payer: MEDICAID

## 2020-04-24 VITALS
WEIGHT: 207 LBS | HEIGHT: 56 IN | DIASTOLIC BLOOD PRESSURE: 90 MMHG | SYSTOLIC BLOOD PRESSURE: 129 MMHG | HEART RATE: 90 BPM | BODY MASS INDEX: 46.56 KG/M2

## 2020-04-24 DIAGNOSIS — J20.9 BRONCHITIS WITH BRONCHOSPASM: ICD-10-CM

## 2020-04-24 DIAGNOSIS — J45.30 MILD PERSISTENT ASTHMA WITHOUT COMPLICATION: Chronic | ICD-10-CM

## 2020-04-24 DIAGNOSIS — J45.41 MODERATE PERSISTENT ASTHMA WITH ACUTE EXACERBATION: Primary | ICD-10-CM

## 2020-04-24 PROCEDURE — 99215 PR OFFICE/OUTPT VISIT, EST, LEVL V, 40-54 MIN: ICD-10-PCS | Mod: 95,,, | Performed by: INTERNAL MEDICINE

## 2020-04-24 PROCEDURE — 99215 OFFICE O/P EST HI 40 MIN: CPT | Mod: 95,,, | Performed by: INTERNAL MEDICINE

## 2020-04-24 RX ORDER — ALBUTEROL SULFATE 0.83 MG/ML
2.5 SOLUTION RESPIRATORY (INHALATION) EVERY 4 HOURS
Qty: 180 ML | Refills: 11 | Status: SHIPPED | OUTPATIENT
Start: 2020-04-24 | End: 2021-12-09

## 2020-04-24 RX ORDER — LEVOFLOXACIN 500 MG/1
500 TABLET, FILM COATED ORAL DAILY
Qty: 10 TABLET | Refills: 0 | Status: SHIPPED | OUTPATIENT
Start: 2020-04-24 | End: 2020-06-22 | Stop reason: ALTCHOICE

## 2020-04-24 RX ORDER — MONTELUKAST SODIUM 10 MG/1
10 TABLET ORAL NIGHTLY
Qty: 30 TABLET | Refills: 11 | Status: SHIPPED | OUTPATIENT
Start: 2020-04-24 | End: 2021-06-09

## 2020-04-24 NOTE — PROGRESS NOTES
Subjective:      The patient location is:  [id number] [place] [id number]    The chief complaint leading to consultation is: follow up for Emergency Room visit for asthma and UTI  Visit type: Virtual visit with synchronous audio and video     Each patient to whom he or she provides medical services by telemedicine is:  (1) informed of the relationship between the physician and patient and the respective role of any other health care provider with respect to management of the patient; and (2) notified that he or she may decline to receive medical services by telemedicine and may withdraw from such care at any time.    Notes: ashtma not improved since Emergency Room visit  - unable to give steroids due to hyperglycemia       Patient ID: Yolanda Betts is a 49 y.o. female.    Chief Complaint:      Asthma      She   presents for evaluation and treatment of asthma after being discharged from the hospital  1  week ago. Since discharge symptoms have unchanged course since that time. Patient denies fever or chills. Symptoms are aggravated by activity. Symptoms improve with rest.  Respiratory: positive for cough, dyspnea on exertion, sputum and wheezing; Cardiovascular: no chest pain or palpitations.  Patient currently is on oxygen at 2 L/min per nasal cannula..- at night    Asthma Follow-up  The patient has previously been evaluated here for asthma and presents for an asthma follow-up. The patient is currently having symptoms / an exacerbation. Current symptoms include dyspnea, non-productive cough and wheezing. Symptoms have been present since several weeks ago and have been gradually worsening. She denies chest pain and chest tightness. Associated symptoms include fatigue and poor exercise tolerance.  This episode appears to have been triggered by upper respiratory infection. Treatments tried for the current exacerbation include inhaled corticosteroids and short-acting inhaled beta-adrenergic agonists, which  have provided some relief of symptoms. The patient has been having similar episodes for approximately 4 years.    Current Disease Severity  The patient is having daytime symptoms throughout the day. The patient is having daytime symptoms often 7 times per week. The patient is using short-acting beta agonists for symptom control several times per day. She has exacerbations requiring oral systemic corticosteroids 1 times per year. Current limitations in activity from asthma: homebound. Number of days of school or work missed in the last month: not applicable. Number of urgent/emergent visit in last year: 1.  The patient is not using a spacer with MDIs. Her best peak flow rate is na. She is not monitoring peak flow rates at home.  Answers for HPI/ROS submitted by the patient on 4/24/2020   Asthma  In the past 4 weeks, how much of the time did your asthma keep you from getting as much done at work, school, or at home?: some of the time  During the past 4 weeks, how often have you had shortness of breath?: more than once a day  During the past 4 weeks, how often did your asthma symptoms (Wheezing, coughing, shortness of breath, chest tightness or pain) wake you up at night or earlier that usual in the morning?: 2 or 3 nights a week  During the past 4 weeks, how often have you used your rescue inhaler or nebulizer medication (such as albuterol)?: 1 or 2 times per day  How would you rate your asthma control during the past 4 weeks?: not controlled at all   : 9      MEDICAL RECORDS FROM THE HOSPITAL REVIEWED:   Clinical Impression          ICD-10-CM ICD-9-CM   1. COPD with acute exacerbation J44.1 491.21   2. Dyspnea R06.00 786.09   3. Urinary tract infection without hematuria, site unspecified N39.0 599.0   4. Hyperglycemia R73.9 790.29         Disposition:   Disposition: Discharged  Condition: Stable     HPI     Obstructive Sleep Apnea - uses oxygen on nasal cannula  Noncompliant with Continuous Positive Airway Pressure        Past Medical History:   Diagnosis Date    Abnormal Pap smear of cervix     HPV genital warts    Anemia     Anxiety     Arthritis     Asthma 10/11/2016    Bipolar 1 disorder     Diabetes mellitus, type 2     Dyslipidemia associated with type 2 diabetes mellitus 2019    Genital warts     GERD (gastroesophageal reflux disease)     Herpes simplex virus (HSV) infection     Hypertension     Hypertension complicating diabetes 2019    Migraine with aura and without status migrainosus, not intractable 3/21/2016    Mild persistent asthma without complication 10/11/2016    Morbid obesity with body mass index (BMI) of 45.0 to 49.9 in adult 8/3/2017    Obstructive sleep apnea     ALEN (obstructive sleep apnea)     2L per N/C q HS    Schizoaffective disorder, bipolar type 2019    Seasonal allergic rhinitis due to pollen 2019    Type 2 diabetes mellitus with hyperglycemia, with long-term current use of insulin 2017    Type 2 diabetes mellitus with hyperglycemia, without long-term current use of insulin 2017     Past Surgical History:   Procedure Laterality Date    abcess removed right back      BELT ABDOMINOPLASTY  1996    BREAST SURGERY Bilateral 1996    Reduction     SECTION      X 2    COLONOSCOPY      COLONOSCOPY N/A 2018    Procedure: colonoscopy for iron deficiency anemia;  Surgeon: Frankie Sanchez MD;  Location: [facility];  Service: Endoscopy;  Laterality: N/A;    COLONOSCOPY N/A 2018    Procedure: COLONOSCOPY;  Surgeon: Frankie Sanchez MD;  Location: [facility];  Service: Endoscopy;  Laterality: N/A;    HYSTERECTOMY      w/ BSO; hypermenorrhea    MOUTH SURGERY      OOPHORECTOMY      hyst/bso, hypermenorrhea    ROBOT-ASSISTED CHOLECYSTECTOMY USING DA DARI XI N/A 1/3/2020    Procedure: XI ROBOTIC CHOLECYSTECTOMY;  Surgeon: Damir Driscoll MD;  Location[facility];  Service: General;  Laterality: N/A;    TUBAL LIGATION    "    Review of patient's allergies indicates:   Allergen Reactions    Codeine Itching    Ibuprofen     Neuromuscular blockers, steroidal Hives     some    Penicillins     Sulfamethoxazole-trimethoprim     Latex, natural rubber Rash    Morphine Rash     itching    Norco [hydrocodone-acetaminophen] Itching, Rash and Hallucinations    Seconal [secobarbital sodium] Rash     itching    Tylox [oxycodone-acetaminophen] Rash       Current Outpatient Medications on File Prior to Visit   Medication Sig Dispense Refill    ALPRAZolam (XANAX) 2 MG Tab Take 1 tablet (2 mg total) by mouth 2 (two) times daily. 60 tablet 1    amLODIPine (NORVASC) 10 MG tablet Take 1 tablet (10 mg total) by mouth once daily. 30 tablet 3    amLODIPine (NORVASC) 5 MG tablet Take 1 tablet (5 mg total) by mouth every evening. 30 tablet 11    ARIPiprazole (ABILIFY) 10 MG Tab Take 1 tablet by mouth at bedtime 30 tablet 2    aspirin (ECOTRIN) 81 MG EC tablet Take 81 mg by mouth once daily.      atorvastatin (LIPITOR) 20 MG tablet Take 1 tablet (20 mg total) by mouth every evening. 90 tablet 3    BD INSULIN SYRINGE ULTRA-FINE 1/2 mL 30 gauge x 1/2" Syrg   0    benztropine (COGENTIN) 1 MG tablet Take 1 tablet (1 mg total) by mouth every evening. 30 tablet 1    blood sugar diagnostic Strp Check blood glucose 4 times daily as directed and as needed (dispense insurance preferred brand or patient choice) 200 each 5    blood-glucose meter Misc Use to check blood sugars, give meter based on insurance specification 1 each 1    chlorproMAZINE (THORAZINE) 50 MG tablet Take 1 tablet (50 mg total) by mouth at bedtime 30 tablet 1    cyclobenzaprine (FLEXERIL) 10 MG tablet Take 1 tablet (10 mg total) by mouth 3 (three) times daily as needed for Muscle spasms. 90 tablet 0    cycloSPORINE (RESTASIS) 0.05 % ophthalmic emulsion Place 0.4 mLs (1 drop total) into both eyes 2 (two) times daily. 60 each 11    DULoxetine (CYMBALTA) 60 MG capsule Take 1 " capsule (60 mg total) by mouth 2 (two) times daily. 60 capsule 1    ergocalciferol (ERGOCALCIFEROL) 50,000 unit Cap Take 1 capsule (50,000 Units total) by mouth every 14 (fourteen) days. 4 capsule 5    fish oil-omega-3 fatty acids 300-1,000 mg capsule Take 1 capsule by mouth once daily.      fluticasone propionate (FLONASE) 50 mcg/actuation nasal spray 2 sprays (100 mcg total) by Each Nare route once daily. (Patient taking differently: 2 sprays by Each Nostril route daily as needed. ) 16 g 11    furosemide (LASIX) 20 MG tablet Take 1 tablet (20 mg total) by mouth once daily. 30 tablet 11    gabapentin (NEURONTIN) 300 MG capsule Take 2 capsules (600 mg total) by mouth 3 (three) times daily. 90 capsule 3    hydrALAZINE (APRESOLINE) 10 MG tablet Take 1 tablet (10 mg total) by mouth every 12 (twelve) hours. 60 tablet 11    insulin aspart U-100 (NOVOLOG FLEXPEN U-100 INSULIN) 100 unit/mL (3 mL) InPn pen Inject before meals three times a day up to 30 units daily (Patient taking differently: Inject 20 Units into the skin 3 (three) times daily with meals. Inject before meals three times a day up to 30 units daily) 15 mL 3    lancets 28 gauge Misc use as directed 3 times daily 100 each 11    linaCLOtide (LINZESS) 145 mcg Cap capsule Take 1 capsule (145 mcg total) by mouth daily as needed (for constipation). 30 capsule 11    liraglutide 0.6 mg/0.1 mL, 18 mg/3 mL, subq PNIJ (VICTOZA 2-MILENA) 0.6 mg/0.1 mL (18 mg/3 mL) PnIj Use 1.8mg under the skin daily (Patient taking differently: Inject 1.8 mg into the skin once daily. Use 1.8mg under the skin daily) 9 mL 3    lurasidone 120 mg Tab Take 1 tablet (120 mg total) by mouth once daily at 5 pm. 30 tablet 1    magnesium oxide (MAG-OX) 400 mg (241.3 mg magnesium) tablet Take 1 tablet (400 mg total) by mouth once daily. 30 tablet 3    meclizine (ANTIVERT) 25 mg tablet Take 1 tablet by mouth every day as needed until directed to stop. 5 tablet 0    metFORMIN (GLUCOPHAGE)  "1000 MG tablet TAKE 1 TABLET (1,000 MG TOTAL) BY MOUTH 2 (TWO) TIMES DAILY. 180 tablet 11    metoprolol succinate (TOPROL-XL) 100 MG 24 hr tablet Take 2 tablets (200 mg total) by mouth every evening. 180 tablet 3    mirtazapine (REMERON) 45 MG tablet Take 1 tablet (45 mg total) by mouth every evening. 30 tablet 1    MULTIVIT,CALC,MINS/IRON/FOLIC (DAILY MULTIPLE FOR WOMEN ORAL) Take 1 tablet by mouth once daily.      nitrofurantoin, macrocrystal-monohydrate, (MACROBID) 100 MG capsule Take 1 capsule (100 mg total) by mouth 2 (two) times daily. for 5 days 10 capsule 0    pen needle, diabetic (BD ULTRA-FINE MINI PEN NEEDLE) 31 gauge x 3/16" Ndle Use 5 a day 200 each 3    spironolactone (ALDACTONE) 25 MG tablet Take 1 tablet (25 mg total) by mouth once daily. 30 tablet 6    topiramate (TOPAMAX) 50 MG tablet Take 1 tablet (50 mg total) by mouth 2 (two) times daily. 60 tablet 11    TOUJEO MAX U-300 SOLOSTAR 300 unit/mL (3 mL) InPn Inject 90 units once daily at bedtime 15 mL 3    TOUJEO SOLOSTAR U-300 INSULIN 300 unit/mL (1.5 mL) InPn pen Inject 80 Units into the skin every evening. Needs 9 pens for 1 month so pharmacy please give adequate amount  and call office for questions 12 mL 3    triazolam (HALCION) 0.25 MG Tab Take 1 tablet by mouth at bedtime 30 tablet 2    valACYclovir (VALTREX) 1000 MG tablet Take 1 tablet (1,000 mg total) by mouth once daily. 30 tablet 11    valsartan (DIOVAN) 320 MG tablet Take 1 tablet (320 mg total) by mouth once daily. 90 tablet 3    zolpidem (AMBIEN) 10 mg Tab Take 1 tablet by mouth at bedtime 30 tablet 2    [DISCONTINUED] albuterol (PROVENTIL) 2.5 mg /3 mL (0.083 %) nebulizer solution Take 3 mLs (2.5 mg total) by nebulization every 4 (four) hours. 150 mL 11    [DISCONTINUED] mometasone (ASMANEX TWISTHALER) 220 mcg (120 doses) AePB Inhale 2 puffs into the lungs 2 (two) times daily. Controller 1 each 11     No current facility-administered medications on file prior to visit.  " "    Social History     Socioeconomic History    Marital status: Single     Spouse name: Not on file    Number of children: Not on file    Years of education: Not on file    Highest education level: Not on file   Occupational History    Not on file   Social Needs    Financial resource strain: Not on file    Food insecurity:     Worry: Not on file     Inability: Not on file    Transportation needs:     Medical: Not on file     Non-medical: Not on file   Tobacco Use    Smoking status: Never Smoker    Smokeless tobacco: Never Used   Substance and Sexual Activity    Alcohol use: Yes     Alcohol/week: 0.0 standard drinks     Comment: socially  No alcohol 72h prior to sx    Drug use: No    Sexual activity: Not Currently     Partners: Male   Lifestyle    Physical activity:     Days per week: Not on file     Minutes per session: Not on file    Stress: Not on file   Relationships    Social connections:     Talks on phone: Not on file     Gets together: Not on file     Attends [employer] service: Not on file     Active member of club or organization: Not on file     Attends meetings of clubs or organizations: Not on file     Relationship status: Not on file   Other Topics Concern    Not on file   Social History Narrative    Long-term care nurse     @[employer]@  Review of Systems   Constitutional: Positive for fatigue.   HENT: Positive for congestion.    Respiratory: Positive for cough, sputum production, shortness of breath, wheezing, asthma nighttime symptoms, dyspnea on extertion, use of rescue inhaler and Paroxysmal Nocturnal Dyspnea.    Cardiovascular: Positive for leg swelling.   Genitourinary: Positive for difficulty urinating.   Endocrine: endocrine negative   Musculoskeletal: Positive for arthralgias.   Skin: Negative.    Gastrointestinal: Negative.    Neurological: Positive for weakness.   Psychiatric/Behavioral: Positive for sleep disturbance.       Objective:        BP (!) 129/90   Pulse 90   Ht 4' 8" " "(1.422 m)   Wt 93.9 kg (207 lb)   BMI 46.41 kg/m²     The patient has been recording vital signs at home and the following data was reviewed to make a evaluation and management decision:  Review of data:  No flowsheet data found.  No flowsheet data found.  Wt Readings from Last 1 Encounters:   04/24/20 93.9 kg (207 lb)     Ht Readings from Last 1 Encounters:   04/24/20 4' 8" (1.422 m)       Constitutional: Patient is oriented to person, place and time.  He appears well developed and well nourished.  Neurologic: He is alert and orientated.  Psychiatric: He has appropriate mood and affect. His behavior during the interview as normal. Judgement and thought content - normal.      Personal Diagnostic Review    X-Ray Chest AP Portable  Narrative: EXAMINATION:  XR CHEST AP PORTABLE    CLINICAL HISTORY:  . Dyspnea, unspecified    TECHNIQUE:  Single frontal portable view of the chest was performed.    COMPARISON:  04/11/2020    FINDINGS:  Support devices: None    Lung volumes are low.The lungs are clear, with normal appearance of pulmonary vasculature and no pleural effusion or pneumothorax.    The cardiac silhouette is normal in size. The hilar and mediastinal contours are unremarkable.    Bones are intact.  Impression: No acute abnormality.    Electronically signed by: Delon Mitchell  Date:    04/19/2020  Time:    19:20      Office Spirometry Results:     No flowsheet data found.  Pulmonary Studies Review 4/24/2020   SpO2 -   Height 56.000   Weight 3312   BMI (Calculated) 46.4   Predicted Distance 314.45   Predicted Distance Meters (Calculated) 480.36         Assessment:            Moderate persistent asthma with acute exacerbation  -     montelukast (SINGULAIR) 10 mg tablet; Take 1 tablet (10 mg total) by mouth every evening.  Dispense: 30 tablet; Refill: 11  -     levoFLOXacin (LEVAQUIN) 500 MG tablet; Take 1 tablet (500 mg total) by mouth once daily.  Dispense: 10 tablet; Refill: 0  -     albuterol (PROVENTIL) 2.5 mg /3 " "mL (0.083 %) nebulizer solution; Take 3 mLs (2.5 mg total) by nebulization every 4 (four) hours.  Dispense: 180 mL; Refill: 11    Bronchitis with bronchospasm  -     albuterol (PROVENTIL) 2.5 mg /3 mL (0.083 %) nebulizer solution; Take 3 mLs (2.5 mg total) by nebulization every 4 (four) hours.  Dispense: 180 mL; Refill: 11    Mild persistent asthma without complication  -     mometasone (ASMANEX TWISTHALER) 220 mcg/ actuation (120) AePB; Inhale 2 puffs into the lungs 2 (two) times daily. Controller  Dispense: 1 each; Refill: 11  -     Spirometry without Bronchodilator; Future; Expected date: 04/24/2020          Outpatient Encounter Medications as of 4/24/2020   Medication Sig Dispense Refill    albuterol (PROVENTIL) 2.5 mg /3 mL (0.083 %) nebulizer solution Take 3 mLs (2.5 mg total) by nebulization every 4 (four) hours. 180 mL 11    ALPRAZolam (XANAX) 2 MG Tab Take 1 tablet (2 mg total) by mouth 2 (two) times daily. 60 tablet 1    amLODIPine (NORVASC) 10 MG tablet Take 1 tablet (10 mg total) by mouth once daily. 30 tablet 3    amLODIPine (NORVASC) 5 MG tablet Take 1 tablet (5 mg total) by mouth every evening. 30 tablet 11    ARIPiprazole (ABILIFY) 10 MG Tab Take 1 tablet by mouth at bedtime 30 tablet 2    aspirin (ECOTRIN) 81 MG EC tablet Take 81 mg by mouth once daily.      atorvastatin (LIPITOR) 20 MG tablet Take 1 tablet (20 mg total) by mouth every evening. 90 tablet 3    BD INSULIN SYRINGE ULTRA-FINE 1/2 mL 30 gauge x 1/2" Syrg   0    benztropine (COGENTIN) 1 MG tablet Take 1 tablet (1 mg total) by mouth every evening. 30 tablet 1    blood sugar diagnostic Strp Check blood glucose 4 times daily as directed and as needed (dispense insurance preferred brand or patient choice) 200 each 5    blood-glucose meter Misc Use to check blood sugars, give meter based on insurance specification 1 each 1    chlorproMAZINE (THORAZINE) 50 MG tablet Take 1 tablet (50 mg total) by mouth at bedtime 30 tablet 1    " cyclobenzaprine (FLEXERIL) 10 MG tablet Take 1 tablet (10 mg total) by mouth 3 (three) times daily as needed for Muscle spasms. 90 tablet 0    cycloSPORINE (RESTASIS) 0.05 % ophthalmic emulsion Place 0.4 mLs (1 drop total) into both eyes 2 (two) times daily. 60 each 11    DULoxetine (CYMBALTA) 60 MG capsule Take 1 capsule (60 mg total) by mouth 2 (two) times daily. 60 capsule 1    ergocalciferol (ERGOCALCIFEROL) 50,000 unit Cap Take 1 capsule (50,000 Units total) by mouth every 14 (fourteen) days. 4 capsule 5    fish oil-omega-3 fatty acids 300-1,000 mg capsule Take 1 capsule by mouth once daily.      fluticasone propionate (FLONASE) 50 mcg/actuation nasal spray 2 sprays (100 mcg total) by Each Nare route once daily. (Patient taking differently: 2 sprays by Each Nostril route daily as needed. ) 16 g 11    furosemide (LASIX) 20 MG tablet Take 1 tablet (20 mg total) by mouth once daily. 30 tablet 11    gabapentin (NEURONTIN) 300 MG capsule Take 2 capsules (600 mg total) by mouth 3 (three) times daily. 90 capsule 3    hydrALAZINE (APRESOLINE) 10 MG tablet Take 1 tablet (10 mg total) by mouth every 12 (twelve) hours. 60 tablet 11    insulin aspart U-100 (NOVOLOG FLEXPEN U-100 INSULIN) 100 unit/mL (3 mL) InPn pen Inject before meals three times a day up to 30 units daily (Patient taking differently: Inject 20 Units into the skin 3 (three) times daily with meals. Inject before meals three times a day up to 30 units daily) 15 mL 3    lancets 28 gauge Misc use as directed 3 times daily 100 each 11    linaCLOtide (LINZESS) 145 mcg Cap capsule Take 1 capsule (145 mcg total) by mouth daily as needed (for constipation). 30 capsule 11    liraglutide 0.6 mg/0.1 mL, 18 mg/3 mL, subq PNIJ (VICTOZA 2-MILENA) 0.6 mg/0.1 mL (18 mg/3 mL) PnIj Use 1.8mg under the skin daily (Patient taking differently: Inject 1.8 mg into the skin once daily. Use 1.8mg under the skin daily) 9 mL 3    lurasidone 120 mg Tab Take 1 tablet (120 mg  "total) by mouth once daily at 5 pm. 30 tablet 1    magnesium oxide (MAG-OX) 400 mg (241.3 mg magnesium) tablet Take 1 tablet (400 mg total) by mouth once daily. 30 tablet 3    meclizine (ANTIVERT) 25 mg tablet Take 1 tablet by mouth every day as needed until directed to stop. 5 tablet 0    metFORMIN (GLUCOPHAGE) 1000 MG tablet TAKE 1 TABLET (1,000 MG TOTAL) BY MOUTH 2 (TWO) TIMES DAILY. 180 tablet 11    metoprolol succinate (TOPROL-XL) 100 MG 24 hr tablet Take 2 tablets (200 mg total) by mouth every evening. 180 tablet 3    mirtazapine (REMERON) 45 MG tablet Take 1 tablet (45 mg total) by mouth every evening. 30 tablet 1    mometasone (ASMANEX TWISTHALER) 220 mcg/ actuation (120) AePB Inhale 2 puffs into the lungs 2 (two) times daily. Controller 1 each 11    MULTIVIT,CALC,MINS/IRON/FOLIC (DAILY MULTIPLE FOR WOMEN ORAL) Take 1 tablet by mouth once daily.      nitrofurantoin, macrocrystal-monohydrate, (MACROBID) 100 MG capsule Take 1 capsule (100 mg total) by mouth 2 (two) times daily. for 5 days 10 capsule 0    pen needle, diabetic (BD ULTRA-FINE MINI PEN NEEDLE) 31 gauge x 3/16" Ndle Use 5 a day 200 each 3    spironolactone (ALDACTONE) 25 MG tablet Take 1 tablet (25 mg total) by mouth once daily. 30 tablet 6    topiramate (TOPAMAX) 50 MG tablet Take 1 tablet (50 mg total) by mouth 2 (two) times daily. 60 tablet 11    TOUJEO MAX U-300 SOLOSTAR 300 unit/mL (3 mL) InPn Inject 90 units once daily at bedtime 15 mL 3    TOUJEO SOLOSTAR U-300 INSULIN 300 unit/mL (1.5 mL) InPn pen Inject 80 Units into the skin every evening. Needs 9 pens for 1 month so pharmacy please give adequate amount  and call office for questions 12 mL 3    triazolam (HALCION) 0.25 MG Tab Take 1 tablet by mouth at bedtime 30 tablet 2    valACYclovir (VALTREX) 1000 MG tablet Take 1 tablet (1,000 mg total) by mouth once daily. 30 tablet 11    valsartan (DIOVAN) 320 MG tablet Take 1 tablet (320 mg total) by mouth once daily. 90 tablet 3 "    zolpidem (AMBIEN) 10 mg Tab Take 1 tablet by mouth at bedtime 30 tablet 2    [DISCONTINUED] albuterol (PROVENTIL) 2.5 mg /3 mL (0.083 %) nebulizer solution Take 3 mLs (2.5 mg total) by nebulization every 4 (four) hours. 150 mL 11    [DISCONTINUED] mometasone (ASMANEX TWISTHALER) 220 mcg (120 doses) AePB Inhale 2 puffs into the lungs 2 (two) times daily. Controller 1 each 11    levoFLOXacin (LEVAQUIN) 500 MG tablet Take 1 tablet (500 mg total) by mouth once daily. 10 tablet 0    montelukast (SINGULAIR) 10 mg tablet Take 1 tablet (10 mg total) by mouth every evening. 30 tablet 11     No facility-administered encounter medications on file as of 4/24/2020.      Plan:       Requested Prescriptions     Signed Prescriptions Disp Refills    montelukast (SINGULAIR) 10 mg tablet 30 tablet 11     Sig: Take 1 tablet (10 mg total) by mouth every evening.    levoFLOXacin (LEVAQUIN) 500 MG tablet 10 tablet 0     Sig: Take 1 tablet (500 mg total) by mouth once daily.    albuterol (PROVENTIL) 2.5 mg /3 mL (0.083 %) nebulizer solution 180 mL 11     Sig: Take 3 mLs (2.5 mg total) by nebulization every 4 (four) hours.    mometasone (ASMANEX TWISTHALER) 220 mcg/ actuation (120) AePB 1 each 11     Sig: Inhale 2 puffs into the lungs 2 (two) times daily. Controller     Problem List Items Addressed This Visit     Mild persistent asthma without complication (Chronic)    Relevant Medications    mometasone (ASMANEX TWISTHALER) 220 mcg/ actuation (120) AePB    Other Relevant Orders    Spirometry without Bronchodilator      Other Visit Diagnoses     Moderate persistent asthma with acute exacerbation    -  Primary    Relevant Medications    montelukast (SINGULAIR) 10 mg tablet    levoFLOXacin (LEVAQUIN) 500 MG tablet    albuterol (PROVENTIL) 2.5 mg /3 mL (0.083 %) nebulizer solution    Bronchitis with bronchospasm        Relevant Medications    albuterol (PROVENTIL) 2.5 mg /3 mL (0.083 %) nebulizer solution             Follow up in  about 6 months (around 10/24/2020) for Review kee - on return.    MEDICAL DECISION MAKING: Moderate to high complexity.  Overall, the multiple problems listed are of moderate to high severity that may impact quality of life and activities of daily living. Side effects of medications, treatment plan as well as options and alternatives reviewed and discussed with patient. There was counseling of patient concerning these issues.    Total time spent in counseling and coordination of care - 40  minutes over 50% of time was used in discussion of prognosis, risks, benefits of treatment, instructions and compliance with regimen . Discussion with other physicians or health care providers (DME, NP, pharmacy, respiratory therapy) occurred.     This service was not originating from a related E/M service provided within the previous 7 days nor will  to an E/M service or procedure within the next 24 hours or my soonest available appointment.  Prevailing standard of care was able to be met in this visit.

## 2020-04-24 NOTE — PATIENT INSTRUCTIONS
Linqia True HEPA Air Purifier with Allergen Remover - Black, [id number]    Linqia Air Purifiers are the #1 brand recommended by allergists.  Captures up to 99.97% of microscopic allergens, 0.3 microns or larger from the air that passes through the filter.  Helps reduce odors, VOCs, and certain germs.  Activated carbon pre-filter helps reduce unpleasant odors.  Easy tap controls, with 4 air cleaning levels, including a Turbo Power Cleaning Level.  [employer] qualified.  2, 4, or 8 hour automatic shut-off timer.  Quiet operation.  Control panel light dimmer.

## 2020-04-27 ENCOUNTER — TELEPHONE (OUTPATIENT)
Dept: OBSTETRICS AND GYNECOLOGY | Facility: CLINIC | Age: 50
End: 2020-04-27

## 2020-04-27 ENCOUNTER — PATIENT MESSAGE (OUTPATIENT)
Dept: OBSTETRICS AND GYNECOLOGY | Facility: CLINIC | Age: 50
End: 2020-04-27

## 2020-04-27 NOTE — TELEPHONE ENCOUNTER
----- Message from Tomasa Nation sent at 4/27/2020  1:56 PM CDT -----  Contact: Yolanda Guerrero needs a call back at [phone], Regards to the outbreak that she is having.    [facility] [address] [address]  Phone: [phone] Fax: [phone]    Thanks  Td

## 2020-04-27 NOTE — TELEPHONE ENCOUNTER
Returned phone call to patient, patient stated that she is experiencing lesions around/on her lips, and inside of her mouth, that are very painful, and she has been taking 1,000mg Valtrex everyday. Stated that she did not check to see if she had any lesions in the genital area, on top of dealing with a bladder infection. Stated that she just started taking Levaquin for the bladder infection. Please advise.

## 2020-04-28 ENCOUNTER — TELEPHONE (OUTPATIENT)
Dept: OBSTETRICS AND GYNECOLOGY | Facility: CLINIC | Age: 50
End: 2020-04-28

## 2020-04-28 ENCOUNTER — PATIENT OUTREACH (OUTPATIENT)
Dept: OTHER | Facility: OTHER | Age: 50
End: 2020-04-28

## 2020-04-28 NOTE — TELEPHONE ENCOUNTER
----- Message from Pebbles Burgos sent at 4/28/2020 12:18 PM CDT -----  Contact: self [phone]  Pt would like return call regarding irritation she is experiencing. Please call back at [phone]. Md Yuriy

## 2020-04-28 NOTE — TELEPHONE ENCOUNTER
Patient having issues with oral herpes (mostly on tongue).  Patient taking 1000 mg Valtrex daily and it does not seem to be going away.  Advised patient to call PCP to see if they can see her and help her.  Patient verbalized understanding.

## 2020-04-28 NOTE — PROGRESS NOTES
Digital Medicine: Health  Follow-Up    Judie reports that she is doinb pretty well. Patient has had a few ER visits since our last encounter but patient believes that she is doing okay. Patient AVG BP is above goal but readings are coming down. Patient attributes decrease in BP readings due to cooking more at home and being more active. Encouraged patient to keep up the good work. Also spokje with patient about water intake. Patient thinks she is doing good with water intake but is not sure how much she is drinking. Asked patient to note how much water she is drinking so we can know if she is getting in enough or not. Patient understood. Will f/u in 4 weeks.    The history is provided by the patient. No  was used.     Follow Up  Follow-up reason(s): reading review and routine education      Readings are trending down due to lifestyle change.    Routine Education Topics: eating patterns and physical activity        INTERVENTION(S)  recommended diet modifications, recommend physical activity and encouragement/support          Topic    Eye Exam        Last 5 Patient Entered Readings                                      Current 30 Day Average: 133/91     Recent Readings 4/24/2020 4/19/2020 4/19/2020 4/18/2020 4/17/2020    SBP (mmHg) 129 124 132 134 128    DBP (mmHg) 90 79 87 92 82    Pulse 90 99 100 101 100                      Diet Screening   She has the following dietary restrictions: low sodium diet and diabetic    Barriers to a Healthy Diet: no barriers to healthy eating    Patient reports cooking at home more since covid. Patient attributes decrease in BP to cooking more at home. Patient and I discussed water intake and patient seems to think she drinks enough water but isn't sure how much she is drinking throughout the course of the day. Encouraged patient to be mindful and record how many bottles a day she is drinking on a note, in her phone, etc to see if that's something we need to  work on.     Physical Activity Screening   When asked if exercising, patient responded: yes    Patient participates in the following activities: walking and yard/housework    She identified the following barriers to physical activity: COPD, Asthma    Patient reports that since her last ER visit she has been walking for about 20 minutes per day 3-4x per week. Encouraged patient to keep up the good work. Also mentioned to patient that if she wanted to increase her activity time she could add in 10 minutes a day a few times per week as not to exacerbate any symptoms due to over exertion. Patient understood.    Medication Adherence Screening   She did not miss a dose this month.    Patient identified the following reasons for non-compliance: None    Patient reports no s/s or issues taking BP medication as prescribed.       SDOH

## 2020-05-11 ENCOUNTER — TELEPHONE (OUTPATIENT)
Dept: INTERNAL MEDICINE | Facility: CLINIC | Age: 50
End: 2020-05-11

## 2020-05-11 NOTE — TELEPHONE ENCOUNTER
----- Message from Bobo Tapia sent at 5/11/2020  4:13 PM CDT -----  Contact: PT   Type:  Sooner Apoointment Request    Caller is requesting a sooner appointment.  Caller declined first available appointment listed below.  Caller will not accept being placed on the waitlist and is requesting a message be sent to doctor.  Name of Caller: Pt   When is the first available appointment?08/18/2020  Symptoms: wellness exam   Would the patient rather a call back or a response via [facility]? Call back   Best Call Back Number: [phone] (home)   Additional Information: n/a

## 2020-05-13 ENCOUNTER — PATIENT MESSAGE (OUTPATIENT)
Dept: DIABETES | Facility: CLINIC | Age: 50
End: 2020-05-13

## 2020-05-14 ENCOUNTER — PATIENT OUTREACH (OUTPATIENT)
Dept: ADMINISTRATIVE | Facility: OTHER | Age: 50
End: 2020-05-14

## 2020-05-18 ENCOUNTER — NUTRITION (OUTPATIENT)
Dept: DIABETES | Facility: CLINIC | Age: 50
End: 2020-05-18
Payer: MEDICAID

## 2020-05-18 DIAGNOSIS — Z79.4 ENCOUNTER FOR LONG-TERM (CURRENT) USE OF INSULIN: ICD-10-CM

## 2020-05-18 DIAGNOSIS — E11.65 UNCONTROLLED TYPE 2 DIABETES MELLITUS WITH HYPERGLYCEMIA: Primary | ICD-10-CM

## 2020-05-18 PROCEDURE — 99213 OFFICE O/P EST LOW 20 MIN: CPT | Mod: PBBFAC | Performed by: DIETITIAN, REGISTERED

## 2020-05-18 PROCEDURE — 99999 PR PBB SHADOW E&M-EST. PATIENT-LVL III: ICD-10-PCS | Mod: PBBFAC,,, | Performed by: DIETITIAN, REGISTERED

## 2020-05-18 PROCEDURE — G0108 DIAB MANAGE TRN  PER INDIV: HCPCS | Mod: PBBFAC | Performed by: DIETITIAN, REGISTERED

## 2020-05-18 PROCEDURE — 99999 PR PBB SHADOW E&M-EST. PATIENT-LVL III: CPT | Mod: PBBFAC,,, | Performed by: DIETITIAN, REGISTERED

## 2020-05-18 NOTE — PROGRESS NOTES
Diabetes Care Specialist Telehealth Visit Note     It was in the patient's best interest to receive diabetes self-management education and support services in this format to prevent the exposure to COVID-19.        Established Patient - Audio Only Telehealth Visit  The patient location is: home   The chief complaint leading to consultation is: Diabetes    Visit type: Virtual visit with audio only (telephone)  Total time spent with patient: 30 min      The reason for the audio only service rather than synchronous audio and video virtual visit was related to technical difficulties or patient preference/necessity.     Each patient to whom I provide medical services by telemedicine is:  (1) informed of the relationship between the physician and patient and the respective role of any other health care provider with respect to management of the patient; and (2) notified that they may decline to receive medical services by telemedicine and may withdraw from such care at any time. Patient verbally consented to receive this service via voice-only telephone call.           Diabetes Education  Author: Yara Sanchez RD, CDE  Date: 5/18/2020    Diabetes Care Management Summary  Diabetes Education Record Assessment/Progress: Post Program/Follow-up  Current Diabetes Risk Level: Moderate     Last A1c:   Lab Results   Component Value Date    HGBA1C 7.1 (H) 02/18/2020     Diabetes Management Latest Ref Rng & Units 11/18/2019   HbA1c 4.0 - 5.6 % 9.9 (H)       Initial assessment visit [facility] 6/13/19 w/ A1C 7.7 (6/24/19)    Diabetes Type  Diabetes Type : Type II    Diabetes History  Diabetes Diagnosis: >10 years(>20 yrs)  Current Treatment: Diet, Exercise, Insulin, Injectable, Oral Medication(Metformin 1000mg 1tab twice daily; victoza 1.8 mg daily; toujeo max 90units daily; novolog ac 20units bfst, 20units lunch, 25 units supper )    Health Maintenance was reviewed today with patient. Discussed with patient importance of  routine eye exams, foot exams/foot care, blood work (i.e.: A1c, microalbumin, and lipid), dental visits, yearly flu vaccine, and pneumonia vaccine as indicated by PCP. Patient verbalized understanding.     Health Maintenance Topics with due status: Not Due       Topic Last Completion Date    TETANUS VACCINE 12/21/2017    Colonoscopy 02/28/2018    Influenza Vaccine 10/01/2018    Lipid Panel 02/18/2020    Hemoglobin A1c 02/18/2020     Health Maintenance Due   Topic Date Due    Mammogram  05/02/2020    Foot Exam  05/16/2020    Eye Exam  05/22/2020       Nutrition  Meal Planning: (Intake ~0437-7330 cals/d. Pt limiting carb 45grams/meal (occs 75grm). Inadequate whole grains, nonstarchy vegetables. No excess fat, sodium. No MR shake/entree.)  Meal Plan 24 Hour Recall - Breakfast: 1pkt instant oatmeal, 1slc toast (wheat) - water  Meal Plan 24 Hour Recall - Lunch: pasta 1cup w/ roast beef  Meal Plan 24 Hour Recall - Dinner: pork chop, greens, white rice 1cup, cake (bday yesterday)  Meal Plan 24 Hour Recall - Snack: marco: water     Monitoring   Self Monitoring : Per pt recall from home glucometer, fst BG 98 (ethol yesterday), most 143-172; bed 198-312. Pt reports Dexcom pending.  In the last month, how often have you had a low blood sugar reaction?: never    Exercise   Exercise Type: (Waking around apt complex ~20min 3d/wk.)    Current Diabetes Treatment   Current Treatment: Diet, Exercise, Insulin, Injectable, Oral Medication(Metformin 1000mg 1tab twice daily; victoza 1.8 mg daily; toujeo max 90units daily; novolog ac 20units bfst, 20units lunch, 25 units supper )    Social History  Preferred Learning Method: Face to Face  Primary Support: Self  Smoking Status: Never a Smoker  Alcohol Use: Rarely     Barriers to Change  Barriers to Change: None  Learning Challenges : None    Readiness to Learn   Readiness to Learn : Eager    Cultural Influences  Cultural Influences: No    Diabetes Education Assessment/Progress  Diabetes  Disease Process (diabetes disease process and treatment options): Comprehends Key Points  Nutrition (Incorporating nutritional management into one's lifestyle): Discussion, Individual Session, Demonstrates Understanding/Competency (verbalizes/demonstrates)  Physical Activity (incorporating physical activity into one's lifestyle): Discussion, Individual Session, Demonstrates Understanding/Competency (verbalizes/demonstrates)  Medications (states correct name, dose, onset, peak, duration, side effects & timing of meds): Discussion, Individual Session, Demonstrates Understanding/Competency(verbalizes/demonstrates)  Monitoring (monitoring blood glucose/other parameters & using results): Discussion, Individual Session, Demonstrates Understanding/Competency (verbalizes/demonstrates)  Acute Complications (preventing, detecting, and treating acute complications): Discussion, Individual Session, Demonstrates Understanding/Competency (verbalizes/demonstrates)  Chronic Complications (preventing, detecting, and treating chronic complications): Comprehends Key Points  Clinical (diabetes, other pertinent medical history, and relevant comorbidities reviewed during visit): Comprehends Key Points  Cognitive (knowledge of self-management skills, functional health literacy): Comprehends Key Points  Psychosocial (emotional response to diabetes): Not Covered/Deferred  Diabetes Distress and Support Systems: Not Covered/Deferred  Behavioral (readiness for change, lifestyle practices, self-care behaviors): Discussion, Individual Session, Demonstrates Understanding/Competency (verbalizes/demonstrates)    Goals  Patient has selected/evaluated goals during today's session: Yes, evaluated  Healthy Eating: Set(contiue to use meal plan - carb portions/spacing, rec seasonings, avoid fast foods)  Met Percentage : 75%  Start Date: 05/18/20  Target Date: 07/20/20  Physical Activity: Set(150min/wk - chair exercises or exercise bands)  Met Percentage  : 50%  Start Date: 05/18/20  Target Date: 07/20/20  Monitoring: Set(continue test BG 4x/d; consider Dexcom CGMS)  Met Percentage : 50%  Start Date: 05/18/20  Target Date: 07/20/20         Diabetes Care Plan/Intervention  Education Plan/Intervention: Individual Follow-Up DSMT(RTC ~2mos, prn, as referred. Continue fu w/ Dr Severino for medical mgmt. ) Encouraged increase whole grains, walking frequency to further trim blood sugars. Instructed pt to test BG fst, acl, acd, bedtime to allow providers ability to adjust medications. Due to fasting hyperglycemia, instructed to slightly increase novolog ac supper to 25-28units but to continue other medications as directed. Encouraged pt to contact for Dexcom training once device arrives.     Diabetes Meal Plan  Restrictions: Low Fat, Low Sodium, Restricted Carbohydrate  Calories: 1400  Carbohydrate Per Meal: 30-45g  Carbohydrate Per Snack : 7-15g    Today's Self-Management Care Plan was developed with the patient's input and is based on barriers identified during today's assessment.    The long and short-term goals in the care plan were written with the patient/caregiver's input. The patient has agreed to work toward these goals to improve her overall diabetes control.      The patient received a copy of today's self-management plan and verbalized understanding of the care plan, goals, and all of today's instructions.      The patient was encouraged to communicate with her physician and care team regarding her condition(s) and treatment.  I provided the patient with my contact information today and encouraged her to contact me via phone or patient portal as needed.     Education Units of Time   Time Spent: 30 min

## 2020-05-18 NOTE — LETTER
May 18, 2020        Malka Severino MD  [address]  [place] [address]             [facility]  [address]  [place] [address]  Phone: [phone]  Fax: [phone]   Patient: Yolanda Betts   MR Number: [id number]   YOB: 1970   Date of Visit: 5/18/2020       Dear Dr. Severino:    Thank you for referring Yolanda Betts to me for evaluation. Below are the relevant portions of my assessment and plan of care.     If you have questions, please do not hesitate to call me. I look forward to following Yolanda along with you.    Sincerely,      Yara Sanchez RD, CDE           CC  DOMINIK Beach MD

## 2020-05-19 ENCOUNTER — TELEPHONE (OUTPATIENT)
Dept: INTERNAL MEDICINE | Facility: CLINIC | Age: 50
End: 2020-05-19

## 2020-05-19 ENCOUNTER — HOSPITAL ENCOUNTER (OUTPATIENT)
Dept: RADIOLOGY | Facility: HOSPITAL | Age: 50
Discharge: HOME | End: 2020-05-19
Attending: NURSE PRACTITIONER
Payer: MEDICAID

## 2020-05-19 ENCOUNTER — OFFICE VISIT (OUTPATIENT)
Dept: INTERNAL MEDICINE | Facility: CLINIC | Age: 50
End: 2020-05-19
Payer: MEDICAID

## 2020-05-19 VITALS
OXYGEN SATURATION: 100 % | DIASTOLIC BLOOD PRESSURE: 78 MMHG | HEART RATE: 95 BPM | SYSTOLIC BLOOD PRESSURE: 122 MMHG | WEIGHT: 210.31 LBS | BODY MASS INDEX: 47.31 KG/M2 | HEIGHT: 56 IN | TEMPERATURE: 99 F

## 2020-05-19 DIAGNOSIS — E83.42 HYPOMAGNESEMIA: ICD-10-CM

## 2020-05-19 DIAGNOSIS — M25.561 ACUTE PAIN OF RIGHT KNEE: ICD-10-CM
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
## 2020-05-19 DIAGNOSIS — M25.531 BILATERAL WRIST PAIN: ICD-10-CM

## 2020-05-19 DIAGNOSIS — M25.511 ACUTE PAIN OF RIGHT SHOULDER: ICD-10-CM

## 2020-05-19 DIAGNOSIS — M25.532 BILATERAL WRIST PAIN: ICD-10-CM

## 2020-05-19 DIAGNOSIS — R07.81 RIB PAIN ON RIGHT SIDE: ICD-10-CM

## 2020-05-19 DIAGNOSIS — S09.90XA TRAUMATIC INJURY OF HEAD, INITIAL ENCOUNTER: ICD-10-CM

## 2020-05-19 DIAGNOSIS — E66.01 MORBID OBESITY WITH BMI OF 40.0-44.9, ADULT: Chronic | ICD-10-CM
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
## 2020-05-19 PROCEDURE — 99215 OFFICE O/P EST HI 40 MIN: CPT | Mod: PBBFAC,25 | Performed by: NURSE PRACTITIONER

## 2020-05-19 PROCEDURE — 73560 XR KNEE ORTHO RIGHT: ICD-10-PCS | Mod: 26,LT,, | Performed by: RADIOLOGY

## 2020-05-19 PROCEDURE — 73030 XR SHOULDER COMPLETE 2 OR MORE VIEWS RIGHT: ICD-10-PCS | Mod: 26,RT,, | Performed by: RADIOLOGY

## 2020-05-19 PROCEDURE — 73562 X-RAY EXAM OF KNEE 3: CPT | Mod: 26,RT,, | Performed by: RADIOLOGY

## 2020-05-19 PROCEDURE — 73110 X-RAY EXAM OF WRIST: CPT | Mod: TC,50

## 2020-05-19 PROCEDURE — 99999 PR PBB SHADOW E&M-EST. PATIENT-LVL V: CPT | Mod: PBBFAC,,, | Performed by: NURSE PRACTITIONER

## 2020-05-19 PROCEDURE — 73030 X-RAY EXAM OF SHOULDER: CPT | Mod: TC,RT

## 2020-05-19 PROCEDURE — 73560 X-RAY EXAM OF KNEE 1 OR 2: CPT | Mod: TC,LT

## 2020-05-19 PROCEDURE — 99999 PR PBB SHADOW E&M-EST. PATIENT-LVL V: ICD-10-PCS | Mod: PBBFAC,,, | Performed by: NURSE PRACTITIONER

## 2020-05-19 PROCEDURE — 73562 XR KNEE ORTHO RIGHT: ICD-10-PCS | Mod: 26,RT,, | Performed by: RADIOLOGY

## 2020-05-19 PROCEDURE — 73110 X-RAY EXAM OF WRIST: CPT | Mod: 26,50,, | Performed by: RADIOLOGY

## 2020-05-19 PROCEDURE — 71100 X-RAY EXAM RIBS UNI 2 VIEWS: CPT | Mod: 26,RT,, | Performed by: RADIOLOGY

## 2020-05-19 PROCEDURE — 99214 PR OFFICE/OUTPT VISIT, EST, LEVL IV, 30-39 MIN: ICD-10-PCS | Mod: S$PBB,,, | Performed by: NURSE PRACTITIONER

## 2020-05-19 PROCEDURE — 99214 OFFICE O/P EST MOD 30 MIN: CPT | Mod: S$PBB,,, | Performed by: NURSE PRACTITIONER

## 2020-05-19 PROCEDURE — 73110 XR WRIST COMPLETE 3 VIEWS BILATERAL: ICD-10-PCS | Mod: 26,50,, | Performed by: RADIOLOGY

## 2020-05-19 PROCEDURE — 71100 XR RIBS 2 VIEW RIGHT: ICD-10-PCS | Mod: 26,RT,, | Performed by: RADIOLOGY

## 2020-05-19 PROCEDURE — 73030 X-RAY EXAM OF SHOULDER: CPT | Mod: 26,RT,, | Performed by: RADIOLOGY

## 2020-05-19 PROCEDURE — 73560 X-RAY EXAM OF KNEE 1 OR 2: CPT | Mod: 26,LT,, | Performed by: RADIOLOGY

## 2020-05-19 PROCEDURE — 71100 X-RAY EXAM RIBS UNI 2 VIEWS: CPT | Mod: TC,RT

## 2020-05-19 RX ORDER — TRAMADOL HYDROCHLORIDE 50 MG/1
50 TABLET ORAL EVERY 12 HOURS PRN
Qty: 14 TABLET | Refills: 0 | Status: SHIPPED | OUTPATIENT
Start: 2020-05-19 | End: 2020-06-25

## 2020-05-19 NOTE — TELEPHONE ENCOUNTER
Spoke with patient and informed her that I am checking with our providers to see who can see her today and will contact her when I have an answer. She verbalized understanding.

## 2020-05-19 NOTE — TELEPHONE ENCOUNTER
----- Message from Jana Mejia sent at 5/19/2020  7:23 AM CDT -----  Contact: pt  States she fell yesterday and she is hurting all on her RT side. Nothing coming up on the schedule. She has Medicaid. Please call pt [phone]. Thank you     Type:  Same Day Appointment Request    Caller is requesting a same day appointment.  Caller declined first available appointment listed below.    Name of Caller:pt  When is the first available appointment?June  Symptoms:she fell yesterday  Best Call Back Number:[phone]  Additional Information: .

## 2020-05-19 NOTE — PROGRESS NOTES
Subjective:       Patient ID: Yolanda Betts is a 50 y.o. female.    Chief Complaint: Fall    HPI    Pt reports [facility] that [facility] yesterday. She reports that she slipped on a potato that was in the [place]le and fell on her R side hurting her right shoulder , right knee, right rib cage, and bilateral wrists. She reportedly attempted to brace herself from the fall. She reports that pain is 10/10. Pt reports that she hit her head on the floor, denies neck pain. She did not lose consciousness. + HA. Taking tylenol. Denies blurred vision, dizziness, confusion, cp, sob.     Past Medical History:   Diagnosis Date    Abnormal Pap smear of cervix     HPV genital warts    Anemia     Anxiety     Arthritis     Asthma 10/11/2016    Bipolar 1 disorder     Diabetes mellitus, type 2     Dyslipidemia associated with type 2 diabetes mellitus 2019    Genital warts     GERD (gastroesophageal reflux disease)     Herpes simplex virus (HSV) infection     Hypertension     Hypertension complicating diabetes 2019    Migraine with aura and without status migrainosus, not intractable 3/21/2016    Mild persistent asthma without complication 10/11/2016    Morbid obesity with body mass index (BMI) of 45.0 to 49.9 in adult 8/3/2017    Obstructive sleep apnea     ALEN (obstructive sleep apnea)     2L per N/C q HS    Schizoaffective disorder, bipolar type 2019    Seasonal allergic rhinitis due to pollen 2019    Type 2 diabetes mellitus with hyperglycemia, with long-term current use of insulin 2017    Type 2 diabetes mellitus with hyperglycemia, without long-term current use of insulin 2017       Past Surgical History:   Procedure Laterality Date    abcess removed right back      BELT ABDOMINOPLASTY      BREAST SURGERY Bilateral     Reduction     SECTION      X 2    COLONOSCOPY      COLONOSCOPY N/A 2018    Procedure: colonoscopy for iron deficiency anemia;   Surgeon: Frankie Sanchez MD;  Location: [facility] ENDO;  Service: Endoscopy;  Laterality: N/A;    COLONOSCOPY N/A 2/28/2018    Procedure: COLONOSCOPY;  Surgeon: Frankie Sanchez MD;  Location: [facility] ENDO;  Service: Endoscopy;  Laterality: N/A;    HYSTERECTOMY      w/ BSO; hypermenorrhea    MOUTH SURGERY  1996    OOPHORECTOMY      hyst/bso, hypermenorrhea    ROBOT-ASSISTED CHOLECYSTECTOMY USING DA DARI XI N/A 1/3/2020    Procedure: XI ROBOTIC CHOLECYSTECTOMY;  Surgeon: Damir Driscoll MD;  Location: [facility] OR;  Service: General;  Laterality: N/A;    TUBAL LIGATION       Social History     Socioeconomic History    Marital status: Single     Spouse name: Not on file    Number of children: Not on file    Years of education: Not on file    Highest education level: Not on file   Occupational History    Not on file   Social Needs    Financial resource strain: Not on file    Food insecurity:     Worry: Not on file     Inability: Not on file    Transportation needs:     Medical: Not on file     Non-medical: Not on file   Tobacco Use    Smoking status: Never Smoker    Smokeless tobacco: Never Used   Substance and Sexual Activity    Alcohol use: Yes     Alcohol/week: 0.0 standard drinks     Comment: socially  No alcohol 72h prior to sx    Drug use: No    Sexual activity: Not Currently     Partners: Male   Lifestyle    Physical activity:     Days per week: Not on file     Minutes per session: Not on file    Stress: Not on file   Relationships    Social connections:     Talks on phone: Not on file     Gets together: Not on file     Attends Confucianism service: Not on file     Active member of club or organization: Not on file     Attends meetings of clubs or organizations: Not on file     Relationship status: Not on file   Other Topics Concern    Not on file   Social History Narrative    Long-term care nurse     Review of patient's allergies indicates:   Allergen Reactions    Codeine Itching     "Ibuprofen     Neuromuscular blockers, steroidal Hives     some    Penicillins     Sulfamethoxazole-trimethoprim     Latex, natural rubber Rash    Morphine Rash     itching    Norco [hydrocodone-acetaminophen] Itching, Rash and Hallucinations    Seconal [secobarbital sodium] Rash     itching    Tylox [oxycodone-acetaminophen] Rash     Current Outpatient Medications   Medication Sig    albuterol (PROVENTIL) 2.5 mg /3 mL (0.083 %) nebulizer solution Take 3 mLs (2.5 mg total) by nebulization every 4 (four) hours.    ALPRAZolam (XANAX) 2 MG Tab Take 1 tablet (2 mg total) by mouth 2 (two) times daily.    amLODIPine (NORVASC) 10 MG tablet Take 1 tablet (10 mg total) by mouth once daily.    amLODIPine (NORVASC) 5 MG tablet Take 1 tablet (5 mg total) by mouth every evening.    ARIPiprazole (ABILIFY) 10 MG Tab Take 1 tablet by mouth at bedtime    ARIPiprazole (ABILIFY) 10 MG Tab Take 1 tablet (10 mg total) by mouth once daily.    aspirin (ECOTRIN) 81 MG EC tablet Take 81 mg by mouth once daily.    atorvastatin (LIPITOR) 20 MG tablet Take 1 tablet (20 mg total) by mouth every evening.    BD INSULIN SYRINGE ULTRA-FINE 1/2 mL 30 gauge x 1/2" Syrg     benztropine (COGENTIN) 1 MG tablet Take 1 tablet (1 mg total) by mouth every evening.    blood sugar diagnostic Strp Check blood glucose 4 times daily as directed and as needed (dispense insurance preferred brand or patient choice)    blood-glucose meter Misc Use to check blood sugars, give meter based on insurance specification    chlorproMAZINE (THORAZINE) 50 MG tablet Take 1 tablet (50 mg total) by mouth at bedtime    cycloSPORINE (RESTASIS) 0.05 % ophthalmic emulsion Place 0.4 mLs (1 drop total) into both eyes 2 (two) times daily.    DULoxetine (CYMBALTA) 60 MG capsule Take 1 capsule (60 mg total) by mouth 2 (two) times daily.    ergocalciferol (ERGOCALCIFEROL) 50,000 unit Cap Take 1 capsule (50,000 Units total) by mouth every 14 (fourteen) days.    fish " oil-omega-3 fatty acids 300-1,000 mg capsule Take 1 capsule by mouth once daily.    fluticasone propionate (FLONASE) 50 mcg/actuation nasal spray 2 sprays (100 mcg total) by Each Nare route once daily. (Patient taking differently: 2 sprays by Each Nostril route daily as needed. )    furosemide (LASIX) 20 MG tablet Take 1 tablet (20 mg total) by mouth once daily.    gabapentin (NEURONTIN) 300 MG capsule Take 2 capsules (600 mg total) by mouth 3 (three) times daily.    hydrALAZINE (APRESOLINE) 10 MG tablet Take 1 tablet (10 mg total) by mouth every 12 (twelve) hours.    insulin aspart U-100 (NOVOLOG FLEXPEN U-100 INSULIN) 100 unit/mL (3 mL) InPn pen Inject before meals three times a day up to 30 units daily (Patient taking differently: Inject 20 Units into the skin 3 (three) times daily with meals. Inject before meals three times a day up to 30 units daily)    lancets 28 gauge Misc use as directed 3 times daily    levoFLOXacin (LEVAQUIN) 500 MG tablet Take 1 tablet (500 mg total) by mouth once daily.    linaCLOtide (LINZESS) 145 mcg Cap capsule Take 1 capsule (145 mcg total) by mouth daily as needed (for constipation).    liraglutide 0.6 mg/0.1 mL, 18 mg/3 mL, subq PNIJ (VICTOZA 2-MILENA) 0.6 mg/0.1 mL (18 mg/3 mL) PnIj Use 1.8mg under the skin daily (Patient taking differently: Inject 1.8 mg into the skin once daily. Use 1.8mg under the skin daily)    lurasidone 120 mg Tab Take 1 tablet (120 mg total) by mouth once daily at 5 pm.    magnesium oxide (MAG-OX) 400 mg (241.3 mg magnesium) tablet Take 1 tablet (400 mg total) by mouth once daily.    meclizine (ANTIVERT) 25 mg tablet Take 1 tablet by mouth every day as needed until directed to stop.    metFORMIN (GLUCOPHAGE) 1000 MG tablet TAKE 1 TABLET (1,000 MG TOTAL) BY MOUTH 2 (TWO) TIMES DAILY.    metoprolol succinate (TOPROL-XL) 100 MG 24 hr tablet Take 2 tablets (200 mg total) by mouth every evening.    mirtazapine (REMERON) 45 MG tablet Take 1 tablet (45  "mg total) by mouth every evening.    mometasone (ASMANEX TWISTHALER) 220 mcg/ actuation (120) AePB Inhale 2 puffs into the lungs 2 (two) times daily. Controller    montelukast (SINGULAIR) 10 mg tablet Take 1 tablet (10 mg total) by mouth every evening.    MULTIVIT,CALC,MINS/IRON/FOLIC (DAILY MULTIPLE FOR WOMEN ORAL) Take 1 tablet by mouth once daily.    pen needle, diabetic (BD ULTRA-FINE MINI PEN NEEDLE) 31 gauge x 3/16" Ndle Use 5 a day    QUEtiapine (SEROQUEL) 50 MG tablet Take 1 tablet (50 mg total) by mouth every evening.    spironolactone (ALDACTONE) 25 MG tablet Take 1 tablet (25 mg total) by mouth once daily.    topiramate (TOPAMAX) 50 MG tablet Take 1 tablet (50 mg total) by mouth 2 (two) times daily.    TOUJEO MAX U-300 SOLOSTAR 300 unit/mL (3 mL) InPn Inject 90 units once daily at bedtime    TOUJEO SOLOSTAR U-300 INSULIN 300 unit/mL (1.5 mL) InPn pen Inject 80 Units into the skin every evening. Needs 9 pens for 1 month so pharmacy please give adequate amount  and call office for questions    triazolam (HALCION) 0.25 MG Tab Take 1 tablet by mouth at bedtime    valACYclovir (VALTREX) 1000 MG tablet Take 1 tablet (1,000 mg total) by mouth once daily.    valsartan (DIOVAN) 320 MG tablet Take 1 tablet (320 mg total) by mouth once daily.    zolpidem (AMBIEN) 10 mg Tab Take 1 tablet by mouth at bedtime    zolpidem (AMBIEN) 10 mg Tab Take 1 tablet (10 mg total) by mouth every evening.    temazepam (RESTORIL) 15 mg Cap Take 1 capsule (15 mg total) by mouth every evening.    traMADoL (ULTRAM) 50 mg tablet Take 1 tablet (50 mg total) by mouth every 12 (twelve) hours as needed for Pain.     No current facility-administered medications for this visit.            Review of Systems   Constitutional: Negative for activity change, appetite change, chills, diaphoresis, fatigue, fever and unexpected weight change.   HENT: Negative for congestion, ear pain, postnasal drip, rhinorrhea, sinus pressure, sinus " pain, sneezing, sore throat, tinnitus, trouble swallowing and voice change.    Eyes: Negative for photophobia, pain and visual disturbance.   Respiratory: Negative for cough, chest tightness, shortness of breath and wheezing.    Cardiovascular: Negative for chest pain, palpitations and leg swelling.   Gastrointestinal: Negative for abdominal distention, abdominal pain, constipation, diarrhea, nausea and vomiting.   Genitourinary: Negative for decreased urine volume, difficulty urinating, dysuria, flank pain, frequency, hematuria and urgency.   Musculoskeletal: Positive for arthralgias and myalgias. Negative for back pain, joint swelling, neck pain and neck stiffness.   Allergic/Immunologic: Negative for immunocompromised state.   Neurological: Positive for headaches. Negative for dizziness, tremors, seizures, syncope, facial asymmetry, speech difficulty, weakness, light-headedness and numbness.   Hematological: Negative for adenopathy. Does not bruise/bleed easily.   Psychiatric/Behavioral: Negative for confusion and sleep disturbance.       Objective:      Physical Exam   Constitutional: She is oriented to person, place, and time.   HENT:   Head: Normocephalic and atraumatic.   Eyes: Pupils are equal, round, and reactive to light. Conjunctivae and EOM are normal.   Neck: Normal range of motion. Neck supple.   Cardiovascular: Normal rate, regular rhythm, normal heart sounds and intact distal pulses.   Pulmonary/Chest: Effort normal and breath sounds normal. She exhibits tenderness.       Abdominal: Soft. Bowel sounds are normal.   Musculoskeletal: She exhibits edema.        Right shoulder: She exhibits decreased range of motion, tenderness and pain. She exhibits no bony tenderness, no swelling, no effusion, no crepitus, no deformity, no laceration, no spasm, normal pulse and normal strength.        Right wrist: She exhibits tenderness. She exhibits normal range of motion, no bony tenderness, no swelling, no  effusion, no crepitus, no deformity and no laceration.        Left wrist: She exhibits tenderness. She exhibits normal range of motion, no bony tenderness, no swelling, no effusion, no crepitus, no deformity and no laceration.        Right knee: She exhibits decreased range of motion. She exhibits no swelling, no effusion, no ecchymosis, no deformity, no laceration, no erythema, normal alignment, no LCL laxity and normal patellar mobility. Tenderness found.   Neurological: She is alert and oriented to person, place, and time. She has normal strength. She displays normal reflexes. No cranial nerve deficit or sensory deficit. She exhibits normal muscle tone. Coordination normal. GCS eye subscore is 4. GCS verbal subscore is 5. GCS motor subscore is 6.   Skin: Skin is warm and dry. Capillary refill takes less than 2 seconds.       Assessment:     Vitals:    05/19/20 1102   BP: 122/78   Pulse: 95   Temp: 98.5 °F (36.9 °C)         1. Fall, initial encounter    2. Acute pain of right shoulder    3. Acute pain of right knee    4. Bilateral wrist pain    5. Traumatic injury of head, initial encounter    6. Rib pain on right side    7. Morbid obesity with BMI of 40.0-44.9, adult        Plan:   Fall, initial encounter  -     X-ray Shoulder 2 or More Views Right; Future; Expected date: 05/19/2020    Acute pain of right shoulder  -     X-ray Shoulder 2 or More Views Right; Future; Expected date: 05/19/2020    Acute pain of right knee  -     X-ray Knee Ortho Right; Future; Expected date: 05/19/2020    Bilateral wrist pain  -     X-Ray Wrist Complete Bilateral; Future; Expected date: 05/19/2020    Traumatic injury of head, initial encounter  -     CT Head Without Contrast; Future; Expected date: 05/19/2020    Rib pain on right side  -     X-Ray Ribs 2 View Right; Future; Expected date: 05/19/2020    Morbid obesity with BMI of 40.0-44.9, adult    Other orders  -     traMADoL (ULTRAM) 50 mg tablet; Take 1 tablet (50 mg total) by  mouth every 12 (twelve) hours as needed for Pain.  Dispense: 14 tablet; Refill: 0      Pt reports inability to take nsaids due to hx of stomach ulcers  Tramadol PRN, take with zyrtec if causes itching   Heat/ice  Consider PT if worsening  Normal neuro exam  No bruising erythema or swelling to affected areas on exam  ER precautions discussed

## 2020-05-19 NOTE — TELEPHONE ENCOUNTER
Spoke with patient and informed her that I have her scheduled her an appointment today with Teresita Matthews NP at 10:40 to be evaluated from her fall. She verbalized understanding.

## 2020-05-19 NOTE — TELEPHONE ENCOUNTER
----- Message from Jana Mejia sent at 5/19/2020  7:23 AM CDT -----  Contact: pt  States she fell yesterday and she is hurting all on her RT side. Nothing coming up on the schedule. She has Medicaid. Please call pt [phone]. Thank you     Type:  Same Day Appointment Request    Caller is requesting a same day appointment.  Caller declined first available appointment listed below.    Name of Caller:pt  When is the first available appointment?June  Symptoms:she fell yesterday  Best Call Back Number:[phone]  Additional Information: .

## 2020-05-20 ENCOUNTER — PATIENT OUTREACH (OUTPATIENT)
Dept: ADMINISTRATIVE | Facility: OTHER | Age: 50
End: 2020-05-20

## 2020-05-20 RX ORDER — INSULIN ASPART 100 [IU]/ML
INJECTION, SOLUTION INTRAVENOUS; SUBCUTANEOUS
Qty: 15 ML | Refills: 3 | Status: SHIPPED | OUTPATIENT
Start: 2020-05-20 | End: 2020-08-12 | Stop reason: SDUPTHER

## 2020-05-20 RX ORDER — LANOLIN ALCOHOL/MO/W.PET/CERES
400 CREAM (GRAM) TOPICAL DAILY
Qty: 30 TABLET | Refills: 3 | Status: SHIPPED | OUTPATIENT
Start: 2020-05-20 | End: 2020-09-16 | Stop reason: SDUPTHER

## 2020-05-21 ENCOUNTER — OFFICE VISIT (OUTPATIENT)
Dept: ENDOCRINOLOGY | Facility: CLINIC | Age: 50
End: 2020-05-21
Payer: MEDICAID

## 2020-05-21 DIAGNOSIS — E11.69 DYSLIPIDEMIA ASSOCIATED WITH TYPE 2 DIABETES MELLITUS: ICD-10-CM

## 2020-05-21 DIAGNOSIS — I10 ESSENTIAL HYPERTENSION: ICD-10-CM

## 2020-05-21 DIAGNOSIS — E11.69 TYPE 2 DIABETES MELLITUS WITH OTHER SPECIFIED COMPLICATION, WITH LONG-TERM CURRENT USE OF INSULIN: ICD-10-CM

## 2020-05-21 DIAGNOSIS — Z79.4 TYPE 2 DIABETES MELLITUS WITH OTHER SPECIFIED COMPLICATION, WITH LONG-TERM CURRENT USE OF INSULIN: ICD-10-CM

## 2020-05-21 DIAGNOSIS — E78.5 DYSLIPIDEMIA ASSOCIATED WITH TYPE 2 DIABETES MELLITUS: ICD-10-CM

## 2020-05-21 PROCEDURE — 99213 PR OFFICE/OUTPT VISIT, EST, LEVL III, 20-29 MIN: ICD-10-PCS | Mod: 95,,, | Performed by: INTERNAL MEDICINE

## 2020-05-21 PROCEDURE — 99213 OFFICE O/P EST LOW 20 MIN: CPT | Mod: 95,,, | Performed by: INTERNAL MEDICINE

## 2020-05-21 RX ORDER — ATORVASTATIN CALCIUM 20 MG/1
20 TABLET, FILM COATED ORAL NIGHTLY
Qty: 30 TABLET | Refills: 0 | Status: SHIPPED | OUTPATIENT
Start: 2020-05-21 | End: 2020-06-16 | Stop reason: SDUPTHER

## 2020-05-21 RX ORDER — METFORMIN HYDROCHLORIDE 1000 MG/1
TABLET ORAL
Qty: 180 TABLET | Refills: 0 | Status: SHIPPED | OUTPATIENT
Start: 2020-05-21 | End: 2020-08-11 | Stop reason: SDUPTHER

## 2020-05-21 NOTE — PROGRESS NOTES
Patient ID: Yolanda Betts is a 50 y.o. female.  The patient location is: home  The chief complaint leading to consultation is:  Diabetes  Visit type: audiovisual  Total time spent with patient:  15 min  Each patient to whom he or she provides medical services by telemedicine is:  (1) informed of the relationship between the physician and patient and the respective role of any other health care provider with respect to management of the patient; and (2) notified that he or she may decline to receive medical services by telemedicine and may withdraw from such care at any time.    Notes:  See below        Chief Complaint: Diabetes      Diabetes   Pertinent negatives for hypoglycemia include no confusion, dizziness or headaches. Pertinent negatives for diabetes include no chest pain, no fatigue, no polydipsia, no polyphagia, no polyuria and no weakness.       Yolanda Betts is here for follow-up  of type 2 Diabetes Mellitus    Consultation requested by Dr. Ermias Mccormick    PCP: CHARLA Beach MD      Diagnosed:  Around 2000 and has been on insulin several years.  Had better control earlier on around her diagnosis  Saw Millie Pruitt 2/2018 saw endocrinologist once in 2016, Dr. Mejia    She had a normal C-peptide April 2018 4.56      Past Medical History:   Diagnosis Date    Anemia     Anxiety     Arthritis     Asthma 10/11/2016    Bipolar 1 disorder     Diabetes mellitus, type 2     Dyslipidemia associated with type 2 diabetes mellitus 5/13/2019    GERD (gastroesophageal reflux disease)     Hypertension     Hypertension complicating diabetes 5/5/2019    Mild persistent asthma without complication 10/11/2016    Morbid obesity with body mass index (BMI) of 45.0 to 49.9 in adult 8/3/2017    Obstructive sleep apnea     Schizoaffective disorder, bipolar type 4/25/2019    Seasonal allergic rhinitis due to pollen 5/13/2019    Type 2 diabetes mellitus with hyperglycemia, without long-term  current use of insulin 12/21/2017     Hemoglobin A1C   Date Value Ref Range Status   06/24/2019 7.7 (H) 4.0 - 5.6 % Final     Comment:     ADA Screening Guidelines:  5.7-6.4%  Consistent with prediabetes  >or=6.5%  Consistent with diabetes  High levels of fetal hemoglobin interfere with the HbA1C  assay. Heterozygous hemoglobin variants (HbS, HgC, etc)do  not significantly interfere with this assay.   However, presence of multiple variants may affect accuracy.       Lab Results   Component Value Date    HGBA1C 8.3 (H) 12/22/2017    HGBA1C 8.5 (H) 01/31/2017    HGBA1C 6.6 (H) 03/22/2016       Diabetes medications include: Toujeo   90 qhs  Novolog 20 units tid  Metformin  1000mg bid and Victoza 1.8 mg a day and       Off Jardiance and she developed multiple yeast infections     Tried glyburide in the past  Previously was on Levemir    She also developed an abscess on the right flank that has been irrigated and debrided twice.  Still has abdominal distention and complains of pelvic discomfort as well as discomfort in her flank area; her gyn did order the CT of the abdomen and this was denied by her insurance; her liver function tests on her last evaluation in October 2019 were elevated; she denies any alcohol use.  I ordered an ultrasound that showed fatty liver      She is being followed by Cardiology for history of tachycardia and an echocardiogram and Holter were ordered and also has hypertension    History of obstructive sleep apnea but not using CPAP    Diabetes Complications:peripheral neuropathy  Toes and fingers have numbness and Cymbalta helps she says  Hypoglycemia: NO      Meal Planning:  She has struggled to maintain a healthy diet, trying to improve this    Lab Results   Component Value Date    POCGLU 269 (A) 11/11/2019       Diabetes education: YES:  2019 has started seeing diabetes educator    SMBG: Tests BG four times a day    Log or meter available:  Verbal report  Home values if available:  FBG:   Lately she states her sugars are much better than last visit, running in the 100s though I do not have her meter to verify, her last measured clinic glucose was 177 and 331 in April  Pre-lunch:  Pre-dinner:  Bedtime:  Post Breakfast:  Post Lunch  Post Dinner  Ranges:      Exercise: No     STANDARDS of CARE:        ACE inhibitor or angiotensin II receptor blocker:  Yes        Statin drug:  Yes        Eye exam within last year: Yes        Influenza vaccine up to date: Yes        Pneumonia vaccine:yes         No family history of medullary thyroid cancer and no history of pancreatitis      Interval history:  No longer having the significant shortness of breath and chest discomfort she had at last visit after being seen multiple times in the emergency room and her sugars are better I have reviewed the past medical, family and social history    Review of Systems   Constitutional: Negative for appetite change, fatigue, fever and unexpected weight change.   HENT: Negative for sore throat and trouble swallowing.    Eyes: Negative for visual disturbance.   Respiratory: Negative for chest tightness, shortness of breath and wheezing.    Cardiovascular: Negative for chest pain, palpitations and leg swelling.   Gastrointestinal: Negative for abdominal distention, diarrhea, nausea and vomiting.   Endocrine: Negative for cold intolerance, heat intolerance, polydipsia, polyphagia and polyuria.   Genitourinary: Negative for difficulty urinating, dysuria, menstrual problem and pelvic pain.   Musculoskeletal: Negative for arthralgias and joint swelling.   Skin: Negative for rash.   Neurological: Negative for dizziness, weakness, numbness and headaches.   Psychiatric/Behavioral: Negative for confusion, dysphoric mood and sleep disturbance.       Objective:      Physical Exam   Constitutional: No distress.   Neurological: She is alert.   Psychiatric: She has a normal mood and affect.         Lab Review:   Admission on 04/19/2020,  Discharged on 04/19/2020   Component Date Value    WBC 04/19/2020 7.39     RBC 04/19/2020 3.79*    Hemoglobin 04/19/2020 11.0*    Hematocrit 04/19/2020 35.2*    Mean Corpuscular Volume 04/19/2020 93     Mean Corpuscular Hemoglo* 04/19/2020 29.0     Mean Corpuscular Hemoglo* 04/19/2020 31.3*    RDW 04/19/2020 15.3*    Platelets 04/19/2020 304     MPV 04/19/2020 9.8     Immature Granulocytes 04/19/2020 0.7*    Gran # (ANC) 04/19/2020 3.6     Immature Grans (Abs) 04/19/2020 0.05*    Lymph # 04/19/2020 3.1     Mono # 04/19/2020 0.4     Eos # 04/19/2020 0.2     Baso # 04/19/2020 0.06     nRBC 04/19/2020 0     Gran% 04/19/2020 48.5     Lymph% 04/19/2020 42.2     Mono% 04/19/2020 5.8     Eosinophil% 04/19/2020 2.0     Basophil% 04/19/2020 0.8     Differential Method 04/19/2020 Automated     Sodium 04/19/2020 137     Potassium 04/19/2020 5.0     Chloride 04/19/2020 103     CO2 04/19/2020 20*    Glucose 04/19/2020 343*    BUN, Bld 04/19/2020 14     Creatinine 04/19/2020 0.8     Calcium 04/19/2020 9.0     Total Protein 04/19/2020 7.1     Albumin 04/19/2020 3.5     Total Bilirubin 04/19/2020 0.2     Alkaline Phosphatase 04/19/2020 92     AST 04/19/2020 25     ALT 04/19/2020 29     Anion Gap 04/19/2020 14     eGFR if African American 04/19/2020 >60     eGFR if non African Amer* 04/19/2020 >60     Magnesium 04/19/2020 1.7     Troponin I 04/19/2020 0.007     SARS-CoV-2 RNA, Amplific* 04/19/2020 Negative     Specimen UA 04/19/2020 Urine, Clean Catch     Color, UA 04/19/2020 Yellow     Appearance, UA 04/19/2020 Clear     pH, UA 04/19/2020 6.0     Specific Gravity, UA 04/19/2020 1.025     Protein, UA 04/19/2020 Negative     Glucose, UA 04/19/2020 3+*    Ketones, UA 04/19/2020 Negative     Bilirubin (UA) 04/19/2020 Negative     Occult Blood UA 04/19/2020 Negative     Nitrite, UA 04/19/2020 Negative     Urobilinogen, UA 04/19/2020 Negative     Leukocytes, UA 04/19/2020 Negative      POCT Glucose 04/19/2020 331*    WBC, UA 04/19/2020 2     Bacteria 04/19/2020 Occasional     Yeast, UA 04/19/2020 None     Squam Epithel, UA 04/19/2020 10     Microscopic Comment 04/19/2020 SEE COMMENT     POCT Glucose 04/19/2020 177*   Lab Visit on 04/17/2020   Component Date Value    WBC 04/17/2020 7.32     RBC 04/17/2020 3.97*    Hemoglobin 04/17/2020 11.5*    Hematocrit 04/17/2020 36.5*    Mean Corpuscular Volume 04/17/2020 92     Mean Corpuscular Hemoglo* 04/17/2020 29.0     Mean Corpuscular Hemoglo* 04/17/2020 31.5*    RDW 04/17/2020 15.5*    Platelets 04/17/2020 296     MPV 04/17/2020 9.5     Immature Granulocytes 04/17/2020 0.5     Gran # (ANC) 04/17/2020 4.0     Immature Grans (Abs) 04/17/2020 0.04     Lymph # 04/17/2020 2.7     Mono # 04/17/2020 0.4     Eos # 04/17/2020 0.2     Baso # 04/17/2020 0.06     nRBC 04/17/2020 0     Gran% 04/17/2020 54.4     Lymph% 04/17/2020 37.3     Mono% 04/17/2020 5.2     Eosinophil% 04/17/2020 2.3     Basophil% 04/17/2020 0.8     Differential Method 04/17/2020 Automated     Sodium 04/17/2020 138     Potassium 04/17/2020 4.9     Chloride 04/17/2020 100     CO2 04/17/2020 24     Glucose 04/17/2020 264*    BUN, Bld 04/17/2020 14     Creatinine 04/17/2020 0.7     Calcium 04/17/2020 9.8     Total Protein 04/17/2020 7.3     Albumin 04/17/2020 3.9     Total Bilirubin 04/17/2020 0.1     Alkaline Phosphatase 04/17/2020 93     AST 04/17/2020 19     ALT 04/17/2020 33     Anion Gap 04/17/2020 14     eGFR if African American 04/17/2020 >60     eGFR if non African Amer* 04/17/2020 >60    Lab Visit on 04/17/2020   Component Date Value    Specimen UA 04/17/2020 Urine, Clean Catch     Color, UA 04/17/2020 Yellow     Appearance, UA 04/17/2020 Clear     pH, UA 04/17/2020 5.0     Specific Gravity, UA 04/17/2020 1.025     Protein, UA 04/17/2020 Negative     Glucose, UA 04/17/2020 3+*    Ketones, UA 04/17/2020 Negative     Bilirubin (UA)  04/17/2020 Negative     Occult Blood UA 04/17/2020 Negative     Nitrite, UA 04/17/2020 Negative     Leukocytes, UA 04/17/2020 Negative     Urine Culture, Routine 04/17/2020 No significant growth     RBC, UA 04/17/2020 3     WBC, UA 04/17/2020 2     Bacteria 04/17/2020 Rare     Yeast, UA 04/17/2020 None     Squam Epithel, UA 04/17/2020 2     Microscopic Comment 04/17/2020 SEE COMMENT    Admission on 04/11/2020, Discharged on 04/11/2020   Component Date Value    HIV 1/2 Ag/Ab 04/11/2020 Negative     WBC 04/11/2020 10.59     RBC 04/11/2020 4.22     Hemoglobin 04/11/2020 11.9*    Hematocrit 04/11/2020 38.5     Mean Corpuscular Volume 04/11/2020 91     Mean Corpuscular Hemoglo* 04/11/2020 28.2     Mean Corpuscular Hemoglo* 04/11/2020 30.9*    RDW 04/11/2020 15.9*    Platelets 04/11/2020 303     MPV 04/11/2020 10.0     Immature Granulocytes 04/11/2020 0.6*    Gran # (ANC) 04/11/2020 5.9     Immature Grans (Abs) 04/11/2020 0.06*    Lymph # 04/11/2020 3.9     Mono # 04/11/2020 0.5     Eos # 04/11/2020 0.2     Baso # 04/11/2020 0.07     nRBC 04/11/2020 0     Gran% 04/11/2020 56.0     Lymph% 04/11/2020 36.4     Mono% 04/11/2020 4.5     Eosinophil% 04/11/2020 1.8     Basophil% 04/11/2020 0.7     Differential Method 04/11/2020 Automated     Troponin I 04/11/2020 <0.006     BNP 04/11/2020 <10     Sodium 04/11/2020 132*    Potassium 04/11/2020 4.4     Chloride 04/11/2020 97     CO2 04/11/2020 24     Glucose 04/11/2020 294*    BUN, Bld 04/11/2020 11     Creatinine 04/11/2020 0.7     Calcium 04/11/2020 9.6     Total Protein 04/11/2020 7.5     Albumin 04/11/2020 3.9     Total Bilirubin 04/11/2020 0.2     Alkaline Phosphatase 04/11/2020 90     AST 04/11/2020 20     ALT 04/11/2020 30     Anion Gap 04/11/2020 11     eGFR if African American 04/11/2020 >60.0     eGFR if non African Amer* 04/11/2020 >60.0     Troponin I 04/11/2020 0.006    Appointment on 04/07/2020   Component Date  Value    SARS-CoV2 (COVID-19) Kishan* 04/07/2020 Not Detected    Admission on 04/03/2020, Discharged on 04/03/2020   Component Date Value    WBC 04/03/2020 8.33     RBC 04/03/2020 4.13     Hemoglobin 04/03/2020 11.4*    Hematocrit 04/03/2020 37.8     Mean Corpuscular Volume 04/03/2020 92     Mean Corpuscular Hemoglo* 04/03/2020 27.6     Mean Corpuscular Hemoglo* 04/03/2020 30.2*    RDW 04/03/2020 15.8*    Platelets 04/03/2020 309     MPV 04/03/2020 9.6     Immature Granulocytes 04/03/2020 1.0*    Gran # (ANC) 04/03/2020 4.2     Immature Grans (Abs) 04/03/2020 0.08*    Lymph # 04/03/2020 3.4     Mono # 04/03/2020 0.4     Eos # 04/03/2020 0.2     Baso # 04/03/2020 0.06     nRBC 04/03/2020 0     Gran% 04/03/2020 50.4     Lymph% 04/03/2020 40.8     Mono% 04/03/2020 4.7     Eosinophil% 04/03/2020 2.4     Basophil% 04/03/2020 0.7     Differential Method 04/03/2020 Automated     Sodium 04/03/2020 137     Potassium 04/03/2020 4.8     Chloride 04/03/2020 100     CO2 04/03/2020 25     Glucose 04/03/2020 229*    BUN, Bld 04/03/2020 15     Creatinine 04/03/2020 0.7     Calcium 04/03/2020 9.7     Total Protein 04/03/2020 7.5     Albumin 04/03/2020 3.7     Total Bilirubin 04/03/2020 0.2     Alkaline Phosphatase 04/03/2020 96     AST 04/03/2020 15     ALT 04/03/2020 32     Anion Gap 04/03/2020 12     eGFR if African American 04/03/2020 >60     eGFR if non African Amer* 04/03/2020 >60    Lab Visit on 02/18/2020   Component Date Value    Hemoglobin A1C 02/18/2020 7.1*    Estimated Avg Glucose 02/18/2020 157*    Cholesterol 02/18/2020 126     Triglycerides 02/18/2020 152*    HDL 02/18/2020 46     LDL Cholesterol 02/18/2020 49.6*    Hdl/Cholesterol Ratio 02/18/2020 36.5     Total Cholesterol/HDL Ra* 02/18/2020 2.7     Non-HDL Cholesterol 02/18/2020 80     WBC 02/18/2020 7.71     RBC 02/18/2020 4.14     Hemoglobin 02/18/2020 11.2*    Hematocrit 02/18/2020 38.8     Mean Corpuscular  Volume 02/18/2020 94     Mean Corpuscular Hemoglo* 02/18/2020 27.1     Mean Corpuscular Hemoglo* 02/18/2020 28.9*    RDW 02/18/2020 15.7*    Platelets 02/18/2020 370*    MPV 02/18/2020 10.1     Immature Granulocytes 02/18/2020 0.4     Gran # (ANC) 02/18/2020 4.1     Immature Grans (Abs) 02/18/2020 0.03     Lymph # 02/18/2020 3.0     Mono # 02/18/2020 0.3     Eos # 02/18/2020 0.2     Baso # 02/18/2020 0.06     nRBC 02/18/2020 0     Gran% 02/18/2020 53.7     Lymph% 02/18/2020 39.2     Mono% 02/18/2020 4.0     Eosinophil% 02/18/2020 1.9     Basophil% 02/18/2020 0.8     Differential Method 02/18/2020 Automated     Vit D, 25-Hydroxy 02/18/2020 24*    Ferritin 02/18/2020 62     Iron 02/18/2020 77     Transferrin 02/18/2020 270     TIBC 02/18/2020 400     Saturated Iron 02/18/2020 19*   Clinical Support on 01/17/2020   Component Date Value    Interpretation 01/20/2020                      Value:  Normal spirometry. (FEV1/VC greater than or equal to LLN and FVC greater than or equal to LLN)  Normal airflow. (FEV1/VC greater than or equal to LLN)  Airflow is not clearly improved after bronchodilator. Clinical improvement following bronchodilator therapy may occur in the absence of spirometric improvement.         Post FVC 01/20/2020 1.94     Post FEV1 01/20/2020 1.68     Post FEV1 FVC 01/20/2020 86.76     Post FEF 25 75 01/20/2020 2.23     Post PEF 01/20/2020 4.22     Post  01/20/2020 7.00     Pre FVC 01/20/2020 1.89     Pre FEV1 01/20/2020 1.57     Pre FEV1 FVC 01/20/2020 83.18     Pre FEF 25 75 01/20/2020 1.82     Pre PEF 01/20/2020 4.11     Pre  01/20/2020 7.48     FVC Ref 01/20/2020 2.21     FVC LLN 01/20/2020 1.70     FVC Pre Ref 01/20/2020 85.1     FVC Post Ref 01/20/2020 87.7     FVC Chg 01/20/2020 3.0     FEV1 Ref 01/20/2020 1.83     FEV1 LLN 01/20/2020 1.39     FEV1 Pre Ref 01/20/2020 85.8     FEV1 Post Ref 01/20/2020 92.1     FEV1 Chg 01/20/2020 7.4      FEV1 FVC Ref 01/20/2020 83     FEV1 FVC LLN 01/20/2020 72     FEV1 FVC Pre Ref 01/20/2020 100.8     FEV1 FVC Post Ref 01/20/2020 105.1     FEV1 FVC Chg 01/20/2020 4.3     FEF 25 75 Ref 01/20/2020 2.07     FEF 25 75 LLN 01/20/2020 1.04     FEF 25 75 Pre Ref 01/20/2020 87.7     FEF 25 75 Post Ref 01/20/2020 107.5     FEF 25 75 Chg 01/20/2020 22.6     PEF Ref 01/20/2020 5.10     PEF LLN 01/20/2020 3.57     PEF Pre Ref 01/20/2020 80.6     PEF Post Ref 01/20/2020 82.9     PEF Chg 01/20/2020 2.9     ANG215 Chg 01/20/2020 -6.4    Admission on 01/05/2020, Discharged on 01/06/2020   Component Date Value    Influenza A, Molecular 01/05/2020 Negative     Influenza B, Molecular 01/05/2020 Negative     Flu A & B Source 01/05/2020 Nasal swab     WBC 01/05/2020 9.06     RBC 01/05/2020 3.67*    Hemoglobin 01/05/2020 10.1*    Hematocrit 01/05/2020 33.1*    Mean Corpuscular Volume 01/05/2020 90     Mean Corpuscular Hemoglo* 01/05/2020 27.5     Mean Corpuscular Hemoglo* 01/05/2020 30.5*    RDW 01/05/2020 14.6*    Platelets 01/05/2020 294     MPV 01/05/2020 9.9     Immature Granulocytes 01/05/2020 0.9*    Gran # (ANC) 01/05/2020 5.4     Immature Grans (Abs) 01/05/2020 0.08*    Lymph # 01/05/2020 2.7     Mono # 01/05/2020 0.5     Eos # 01/05/2020 0.3     Baso # 01/05/2020 0.02     nRBC 01/05/2020 0     Gran% 01/05/2020 59.5     Lymph% 01/05/2020 30.1     Mono% 01/05/2020 5.7     Eosinophil% 01/05/2020 3.6     Basophil% 01/05/2020 0.2     Differential Method 01/05/2020 Automated     Sodium 01/05/2020 140     Potassium 01/05/2020 4.3     Chloride 01/05/2020 104     CO2 01/05/2020 24     Glucose 01/05/2020 187*    BUN, Bld 01/05/2020 10     Creatinine 01/05/2020 0.7     Calcium 01/05/2020 9.3     Total Protein 01/05/2020 6.8     Albumin 01/05/2020 3.4*    Total Bilirubin 01/05/2020 0.2     Alkaline Phosphatase 01/05/2020 95     AST 01/05/2020 15     ALT 01/05/2020 23     Anion Gap  01/05/2020 12     eGFR if African American 01/05/2020 >60     eGFR if non African Amer* 01/05/2020 >60     Lipase 01/05/2020 12     Specimen UA 01/05/2020 Urine, Clean Catch     Color, UA 01/05/2020 Yellow     Appearance, UA 01/05/2020 Clear     pH, UA 01/05/2020 7.0     Specific Gravity, UA 01/05/2020 <=1.005*    Protein, UA 01/05/2020 Negative     Glucose, UA 01/05/2020 Negative     Ketones, UA 01/05/2020 Negative     Bilirubin (UA) 01/05/2020 Negative     Occult Blood UA 01/05/2020 Negative     Nitrite, UA 01/05/2020 Negative     Urobilinogen, UA 01/05/2020 Negative     Leukocytes, UA 01/05/2020 Negative    Admission on 01/03/2020, Discharged on 01/04/2020   Component Date Value    POCT Glucose 01/03/2020 149*    Final Pathologic Diagnos* 01/03/2020                      Value:Gallbladder (cholecystectomy):  - Benign gallbladder  - Cholelithiasis      Gross 01/03/2020                      Value:ID/AP Label:  [id number], designated gallbladder    Received in formalin labeled with patient's name medical record number is an  intact gallbladder (9.4 cm in length by 3.2 cm in greatest diameter).  The  serosal surface is purple tan smooth and glistening with roughening around  the liver edge.  The cystic duct margin is inked in black and the specimen is  opened longitudinally revealing viscous romario bile.  Five multifaceted black  stones are identified within the lumen measuring up to 0.3 cm with an  aggregate measurement of 0.5 x 0.5 x 0.3 cm.  The gallbladder wall measures  up to 0.1 cm in greatest thickness.  The mucosal surface is yellow-tan and  smooth with no lesions or polyps.  Representative sections are submitted in  cassette [id number] including cyst duct margin, inked in black.      POCT Glucose 01/03/2020 144*    POCT Glucose 01/03/2020 109     POCT Glucose 01/04/2020 126*    POCT Glucose 01/04/2020 153*    POCT Glucose 01/03/2020 197*   There may be more visits with results that are  not included.       Assessment:     1. Type 2 diabetes, uncontrolled, with neuropathy     2. Essential hypertension      she is overall doing better, will resubmit paperwork for dex com sensor for better management     Continue current medication  Plan:   Type 2 diabetes, uncontrolled, with neuropathy    Essential hypertension          Follow up in about 3 months (around 8/21/2020) for diabetes.    Labs prior to appointment? not applicable     Disclaimer:  This note may have been partially prepared using voice recognition software and  it may have not been extensively proofed, as such there could be errors within the text such as sound alike errors.

## 2020-05-27 ENCOUNTER — TELEPHONE (OUTPATIENT)
Dept: ENDOCRINOLOGY | Facility: CLINIC | Age: 50
End: 2020-05-27

## 2020-05-28 ENCOUNTER — TELEPHONE (OUTPATIENT)
Dept: ENDOCRINOLOGY | Facility: CLINIC | Age: 50
End: 2020-05-28

## 2020-05-28 NOTE — TELEPHONE ENCOUNTER
Getting patient to sign paperwork walking down to bring to pt      ----- Message from Prema Mata sent at 5/28/2020 10:33 AM CDT -----  Contact: self  Pt called stating that she is currently at the office. She was told a nurse would come down to have her sign paperwork. Please call pt back at [phone]

## 2020-06-11 ENCOUNTER — PATIENT OUTREACH (OUTPATIENT)
Dept: OTHER | Facility: OTHER | Age: 50
End: 2020-06-11

## 2020-06-11 NOTE — PROGRESS NOTES
Digital Medicine: Health  Follow-Up    Spoke with patient who states that she is doing okay. Patient experienced a fall about 3-4 weeks ago where she stepped on something slipped and hit her head, chest, and knee. Patient attributes increase in BP readings due to pain associated with fall. Patient was seen by Dr. Matthews where she rated pain 10/10. Patient stated that they were trying to get her set up for physical therapy. Let patient know I would f/u in 4 weeks to see how she is doing.     The history is provided by the patient. No  was used.   Follow Up  Follow-up reason(s): reading review      Readings are trending down due to lifestyle change.        INTERVENTION(S)  encouragement/support          Topic    Eye Exam        Last 5 Patient Entered Readings                                      Current 30 Day Average: 132/90     Recent Readings 6/11/2020 5/27/2020 5/27/2020 5/17/2020 4/24/2020    SBP (mmHg) 109 152 152 135 129    DBP (mmHg) 76 99 99 87 90    Pulse 97 113 113 99 90                      Diet Screening   She has the following dietary restrictions: low sodium diet and diabeticShe cooks for self.    Patient does the shopping for groceries.  She gets groceries from the grocery store.      Patient reports that she continues to cook mostly at home. Encouraged patient to continue with cooking at home. Also encouraged patient to check the nutrition labels and to use 140 mg/ serv as a guideline to look for lower sodium options. Patient understood.    Physical Activity Screening   When asked if exercising, patient responded: no  Patient has limited mobility: new injury  Patient has the following chronic pain: back pain, joint pain and neck pain    She identified the following barriers to physical activity: pain/injury/recent surgery    Patient reported a fall during an in office visit with Dr. Matthews on 5/19/20. Patient is still experiencing pain from fall. Dr. Bueno's office put in  a request for patient to be approved for physical therapy.     Medication Adherence Screening   She did not miss a dose this month.    Patient identified the following reasons for non-compliance: None    Patient reports no s/s or issues taking BP medication as prescribed.       SDOH

## 2020-06-16 DIAGNOSIS — E78.5 DYSLIPIDEMIA ASSOCIATED WITH TYPE 2 DIABETES MELLITUS: ICD-10-CM

## 2020-06-16 DIAGNOSIS — E11.69 DYSLIPIDEMIA ASSOCIATED WITH TYPE 2 DIABETES MELLITUS: ICD-10-CM

## 2020-06-16 RX ORDER — ATORVASTATIN CALCIUM 20 MG/1
20 TABLET, FILM COATED ORAL NIGHTLY
Qty: 30 TABLET | Refills: 0 | Status: SHIPPED | OUTPATIENT
Start: 2020-06-16 | End: 2020-07-23 | Stop reason: SDUPTHER

## 2020-06-17 RX ORDER — TRAMADOL HYDROCHLORIDE 50 MG/1
50 TABLET ORAL EVERY 12 HOURS PRN
Qty: 14 TABLET | Refills: 0 | OUTPATIENT
Start: 2020-06-17

## 2020-06-22 ENCOUNTER — TELEPHONE (OUTPATIENT)
Dept: ENDOCRINOLOGY | Facility: CLINIC | Age: 50
End: 2020-06-22

## 2020-06-22 ENCOUNTER — OFFICE VISIT (OUTPATIENT)
Dept: URGENT CARE | Facility: CLINIC | Age: 50
End: 2020-06-22
Payer: MEDICAID

## 2020-06-22 ENCOUNTER — NURSE TRIAGE (OUTPATIENT)
Dept: ADMINISTRATIVE | Facility: CLINIC | Age: 50
End: 2020-06-22

## 2020-06-22 ENCOUNTER — TELEPHONE (OUTPATIENT)
Dept: INTERNAL MEDICINE | Facility: CLINIC | Age: 50
End: 2020-06-22

## 2020-06-22 VITALS
DIASTOLIC BLOOD PRESSURE: 86 MMHG | SYSTOLIC BLOOD PRESSURE: 163 MMHG | HEART RATE: 100 BPM | TEMPERATURE: 99 F | OXYGEN SATURATION: 97 % | WEIGHT: 206.13 LBS | BODY MASS INDEX: 46.21 KG/M2

## 2020-06-22 DIAGNOSIS — E66.01 MORBID OBESITY WITH BMI OF 40.0-44.9, ADULT: ICD-10-CM

## 2020-06-22 DIAGNOSIS — R68.83 CHILLS WITHOUT FEVER: ICD-10-CM

## 2020-06-22 DIAGNOSIS — R52 GENERALIZED BODY ACHES: ICD-10-CM

## 2020-06-22 DIAGNOSIS — B37.31 CANDIDIASIS OF VAGINA: ICD-10-CM

## 2020-06-22 DIAGNOSIS — R11.2 NAUSEA AND VOMITING IN ADULT: Primary | ICD-10-CM

## 2020-06-22 DIAGNOSIS — R19.7 DIARRHEA, UNSPECIFIED TYPE: ICD-10-CM

## 2020-06-22 DIAGNOSIS — E11.65 TYPE 2 DIABETES MELLITUS WITH HYPERGLYCEMIA, WITH LONG-TERM CURRENT USE OF INSULIN: ICD-10-CM

## 2020-06-22 DIAGNOSIS — Z79.4 TYPE 2 DIABETES MELLITUS WITH HYPERGLYCEMIA, WITH LONG-TERM CURRENT USE OF INSULIN: ICD-10-CM

## 2020-06-22 LAB
BILIRUB SERPL-MCNC: ABNORMAL MG/DL
BLOOD URINE, POC: ABNORMAL
CLARITY, POC UA: CLEAR
COLOR, POC UA: ABNORMAL
GLUCOSE UR QL STRIP: 1000
KETONES UR QL STRIP: ABNORMAL
LEUKOCYTE ESTERASE URINE, POC: ABNORMAL
NITRITE, POC UA: ABNORMAL
PH, POC UA: 5
PROTEIN, POC: ABNORMAL
SPECIFIC GRAVITY, POC UA: 1.02
UROBILINOGEN, POC UA: ABNORMAL

## 2020-06-22 PROCEDURE — 99999 PR PBB SHADOW E&M-EST. PATIENT-LVL V: CPT | Mod: PBBFAC,,, | Performed by: NURSE PRACTITIONER

## 2020-06-22 PROCEDURE — 81002 URINALYSIS NONAUTO W/O SCOPE: CPT | Mod: PBBFAC,PO | Performed by: NURSE PRACTITIONER

## 2020-06-22 PROCEDURE — 99999 PR PBB SHADOW E&M-EST. PATIENT-LVL V: ICD-10-PCS | Mod: PBBFAC,,, | Performed by: NURSE PRACTITIONER

## 2020-06-22 PROCEDURE — 99215 OFFICE O/P EST HI 40 MIN: CPT | Mod: PBBFAC,PO | Performed by: NURSE PRACTITIONER

## 2020-06-22 PROCEDURE — 99214 PR OFFICE/OUTPT VISIT, EST, LEVL IV, 30-39 MIN: ICD-10-PCS | Mod: S$PBB,,, | Performed by: NURSE PRACTITIONER

## 2020-06-22 PROCEDURE — U0003 INFECTIOUS AGENT DETECTION BY NUCLEIC ACID (DNA OR RNA); SEVERE ACUTE RESPIRATORY SYNDROME CORONAVIRUS 2 (SARS-COV-2) (CORONAVIRUS DISEASE [COVID-19]), AMPLIFIED PROBE TECHNIQUE, MAKING USE OF HIGH THROUGHPUT TECHNOLOGIES AS DESCRIBED BY CMS-2020-01-R: HCPCS

## 2020-06-22 PROCEDURE — 99214 OFFICE O/P EST MOD 30 MIN: CPT | Mod: S$PBB,,, | Performed by: NURSE PRACTITIONER

## 2020-06-22 RX ORDER — FLUCONAZOLE 200 MG/1
200 TABLET ORAL DAILY
Qty: 2 TABLET | Refills: 0 | Status: SHIPPED | OUTPATIENT
Start: 2020-06-22 | End: 2020-06-25

## 2020-06-22 RX ORDER — ONDANSETRON 4 MG/1
4 TABLET, ORALLY DISINTEGRATING ORAL EVERY 6 HOURS PRN
Qty: 6 TABLET | Refills: 0 | Status: SHIPPED | OUTPATIENT
Start: 2020-06-22 | End: 2020-07-07

## 2020-06-22 NOTE — TELEPHONE ENCOUNTER
Spoke with patient and informed her that Dr. Beach agreed she should go to urgent care for her symptoms tonight. She verbalized understanding and said she would go.

## 2020-06-22 NOTE — TELEPHONE ENCOUNTER
Calling because she has had diarrhea last 3 days and has a yeast infection having 'lots of pain from down there'      ----- Message from Reba Dominguez sent at 6/22/2020 11:51 AM CDT -----  Type:  Needs Medical Advice    Who Called:  Pt  Yolanda ///This is her second message  Symptoms (please be specific):  Pt states she has a very bad yeast infection and is needing med sent in//   How long has patient had these symptoms:    Pharmacy name and phone #:   [facility]  Would the patient rather a call back or a response via [facility]?   Call back  Best Call Back Number:    [phone]  Additional Information:  Please call [email or web address]

## 2020-06-22 NOTE — TELEPHONE ENCOUNTER
----- Message from Karen Orozco sent at 6/22/2020  4:00 PM CDT -----  Pt is requesting a call back regarding poss COVID.Pt has fever, constant sweating and diarrhea. Please call back at [phone]

## 2020-06-22 NOTE — TELEPHONE ENCOUNTER
Spoke with patient and she stated that she has been having bad diarrhea, sweats and chills for 3 days now and she also has a headache. I informed her that dr. Beach's scheduled is full this week and I don't thing there is anything else available and that she should try to go to urgent care today if possible. She verbalized understanding.

## 2020-06-22 NOTE — TELEPHONE ENCOUNTER
No distress noted. While going through protocol she did say she was having mild chest pressure. Protocol advised call PCP now. She had already decided to go to the urgent care.    Reason for Disposition   Chest pain or pressure    Additional Information   Negative: SEVERE difficulty breathing (e.g., struggling for each breath, speaks in single words)   Negative: Difficult to awaken or acting confused (e.g., disoriented, slurred speech)   Negative: Bluish (or gray) lips or face now   Negative: Shock suspected (e.g., cold/pale/clammy skin, too weak to stand, low BP, rapid pulse)   Negative: Sounds like a life-threatening emergency to the triager   Negative: [1] COVID-19 exposure AND [2] NO symptoms   Negative: COVID-19 and Breastfeeding, questions about   Negative: [1] Adult with possible COVID-19 symptoms AND [2] triager concerned about severity of symptoms or other causes   Negative: SEVERE or constant chest pain or pressure (Exception: mild central chest pain, present only when coughing)   Negative: MODERATE difficulty breathing (e.g., speaks in phrases, SOB even at rest, pulse 100-120)   Negative: Patient sounds very sick or weak to the triager   Negative: MILD difficulty breathing (e.g., minimal/no SOB at rest, SOB with walking, pulse <100)    Protocols used: CORONAVIRUS (COVID-19) DIAGNOSED OR [id number]

## 2020-06-23 ENCOUNTER — TELEPHONE (OUTPATIENT)
Dept: INTERNAL MEDICINE | Facility: CLINIC | Age: 50
End: 2020-06-23

## 2020-06-23 NOTE — TELEPHONE ENCOUNTER
----- Message from Reba Dominguez sent at 6/23/2020  4:26 PM CDT -----  Type:  Sooner Apoointment Request    Caller is requesting a sooner appointment.  Caller declined first available appointment listed below.  Caller will not accept being placed on the waitlist and is requesting a message be sent to doctor.  Name of Caller: Quincy Guerrero  When is the first available appointment?  October//Pt has Mediicaid  Symptoms:  need lab work done and need meds refilled  Would the patient rather a call back or a response via [facility]?   Call back  Best Call Back Number:  [phone]  Additional Information:  Please call//thanks/[facility]

## 2020-06-23 NOTE — PATIENT INSTRUCTIONS
PLAN: Lab work POCT UA, COVID-19, Accu-Chek 308  Advise go to ER if SOB, increase blood sugar, chest pain  Advise increase p.o. fluids--water/juice & rest  Meds:  Zofran and Diflucan / no refills  Advise avoid dairy products  Simply saline nasal wash to irrigate sinuses and for congestion/runny nose.  Cool mist humidifier/vaporizer.  Practice good handwashing.  Advise use of green tea with honey  Tylenol or Ibuprofen for fever, headache and body aches.  Warm salt water gargles for throat comfort.  Chloraseptic spray or lozenges for throat comfort.  Advise follow up with PCP in 2-3 days for recheck  Advise go to ER if symptoms worsen or fail to improve with treatment.  Instructions, follow up, and supportive care as per AVS.  AVS provided and reviewed with patient including supportive care, follow up, and red flag symptoms.   Patient verbalizes understanding and agrees with treatment plan. Discharged from Urgent Care in stable condition.  · .Stay home except to get medical care.  · Separate yourself from other people and animals in your home  · Call ahead before visiting your doctor.  · Wear a facemask.  · Cover your coughs and sneezes.  · Clean your hands often.  · Avoid sharing personal household items.  · Clean all high-touch surfaces every day.  · Monitor your symptoms. Seek prompt medical attention if your illness is worsening (e.g., difficulty breathing). Before seeking care, call your healthcare provider.  · If you have a medical emergency and need to call 911, notify the dispatch personnel that you have, or are being evaluated for COVID-19. If possible, put on a facemask before emergency medical services arrive.  · Discontinuing home isolation. Call your provider about guidance to discontinue home isolation.     Recommended precautions for household members, intimate partners, and caregivers in a nonhealthcare setting of a patient with symptomatic laboratory-confirmed COVID-19 or a patient under  investigation.  Household members, intimate partners, and caregivers in a nonhealthcare setting may have close contact with a person with symptomatic, laboratory-confirmed COVID-19 or a person under investigation. Close contacts should monitor their health; they should call their healthcare provider right away if they develop symptoms suggestive of COVID-19 (e.g., fever, cough, shortness of breath).

## 2020-06-23 NOTE — TELEPHONE ENCOUNTER
Spoke with patient and she stated that her blood glucose was 308 when she went to the urgent care on 6/22/20. She also is awaiting her Covid-19 test results. I scheduled her a video visit with Dr. Beach on 6/25/20 at 7:40 am and informed her that if she feels she needs emergency care before then, please go to the emergency room. Patient verbalized understanding.

## 2020-06-23 NOTE — PROGRESS NOTES
CHIEF COMPLAINT/REASON FOR VISIT:  runny nose, nasal congestion, fever with body aches     HISTORY OF PRESENT ILLNESS:   50  year-old obese female  complains of runny nose, nasal congestion, fever with body aches, headache, nausea, vomiting, diarrhea onset 2-3 days.  Concerned regarding COVID-19, requesting testing.  Complains of having vaginal yeast infection.  Denies seeking emergency room treatment.  Denies blood sugar being elevated.  Discussed with patient the need for POCT UA  & Glucose.  Patient admits tried over-the-counter medications with no relief. Denies chest pain, shortness of breath ,dizziness, blurred vision, loss of appetite.  Admits completed Levaquin/ antibiotics couple of weeks ago.  Seen and treated per Pulmonary in the month of April.       Past Medical History:   Diagnosis Date    Abnormal Pap smear of cervix     HPV genital warts    Anemia     Anxiety     Arthritis     Asthma 10/11/2016    Bipolar 1 disorder     Diabetes mellitus, type 2     Dyslipidemia associated with type 2 diabetes mellitus 5/13/2019    Genital warts     GERD (gastroesophageal reflux disease)     Herpes simplex virus (HSV) infection     Hypertension     Hypertension complicating diabetes 5/5/2019    Migraine with aura and without status migrainosus, not intractable 3/21/2016    Mild persistent asthma without complication 10/11/2016    Morbid obesity with body mass index (BMI) of 45.0 to 49.9 in adult 8/3/2017    Obstructive sleep apnea     ALEN (obstructive sleep apnea)     2L per N/C q HS    Schizoaffective disorder, bipolar type 4/25/2019    Seasonal allergic rhinitis due to pollen 5/13/2019    Type 2 diabetes mellitus with hyperglycemia, with long-term current use of insulin 12/21/2017    Type 2 diabetes mellitus with hyperglycemia, without long-term current use of insulin 12/21/2017         .  Past Surgical History:   Procedure Laterality Date    abcess removed right back      BELT  ABDOMINOPLASTY      BREAST SURGERY Bilateral     Reduction     SECTION      X 2    COLONOSCOPY      COLONOSCOPY N/A 2018    Procedure: colonoscopy for iron deficiency anemia;  Surgeon: Frankie Sanchez MD;  Location: [facility] ENDO;  Service: Endoscopy;  Laterality: N/A;    COLONOSCOPY N/A 2018    Procedure: COLONOSCOPY;  Surgeon: Frankie Sanchez MD;  Location: [facility] ENDO;  Service: Endoscopy;  Laterality: N/A;    HYSTERECTOMY      w/ BSO; hypermenorrhea    MOUTH SURGERY      OOPHORECTOMY      hyst/bso, hypermenorrhea    ROBOT-ASSISTED CHOLECYSTECTOMY USING DA DARI XI N/A 1/3/2020    Procedure: XI ROBOTIC CHOLECYSTECTOMY;  Surgeon: Damir Driscoll MD;  Location: [facility] OR;  Service: General;  Laterality: N/A;    TUBAL LIGATION           Social History     Socioeconomic History    Marital status: Single     Spouse name: Not on file    Number of children: Not on file    Years of education: Not on file    Highest education level: Not on file   Occupational History    Not on file   Social Needs    Financial resource strain: Not on file    Food insecurity     Worry: Not on file     Inability: Not on file    Transportation needs     Medical: Not on file     Non-medical: Not on file   Tobacco Use    Smoking status: Never Smoker    Smokeless tobacco: Never Used   Substance and Sexual Activity    Alcohol use: Yes     Alcohol/week: 0.0 standard drinks     Comment: socially  No alcohol 72h prior to sx    Drug use: No    Sexual activity: Not Currently     Partners: Male   Lifestyle    Physical activity     Days per week: Not on file     Minutes per session: Not on file    Stress: Not on file   Relationships    Social connections     Talks on phone: Not on file     Gets together: Not on file     Attends Scientologist service: Not on file     Active member of club or organization: Not on file     Attends meetings of clubs or organizations: Not on file     Relationship  "status: Not on file   Other Topics Concern    Not on file   Social History Narrative    Long-term care nurse       Family History   Problem Relation Age of Onset    Diabetes Mother     Hyperlipidemia Mother     Hypertension Mother     Asthma Mother     COPD Mother     Glaucoma Mother     Thyroid disease Mother     Anesthesia problems Mother         "almost had a cardiac arrest" , blood clots    Hypertension Father     Hyperlipidemia Father     Cancer Father         Brain, lung, liver, kidney    Heart disease Maternal Grandmother     Hyperlipidemia Maternal Grandmother     Hypertension Maternal Grandmother     Cataracts Maternal Grandmother     Diabetes Maternal Grandmother     Heart disease Maternal Grandfather     Hyperlipidemia Maternal Grandfather     Hypertension Maternal Grandfather     Glaucoma Maternal Grandfather     Cancer Maternal Grandfather     Cataracts Maternal Grandfather     Macular degeneration Maternal Grandfather     Diabetes Maternal Grandfather     Heart disease Paternal Grandmother     Hyperlipidemia Paternal Grandmother     Hypertension Paternal Grandmother     Cataracts Paternal Grandmother     Heart disease Paternal Grandfather     Hyperlipidemia Paternal Grandfather     Hypertension Paternal Grandfather     Cataracts Paternal Grandfather     Breast cancer Maternal Cousin     Breast cancer Maternal Cousin     Breast cancer Maternal Cousin     Breast cancer Maternal Cousin          ROS:  GENERAL: fever, chills with body aches  SKIN: No rashes, itching or changes in color or texture of skin.   HEENT:  Reports runny nose, nasal congestion, headache  NODES: No masses or lesions. Denies swollen glands.   CHEST: reports cough   CARDIOVASCULAR: Denies chest pain, shortness of breath.  ABDOMEN: Appetite fine. No weight loss. Denies diarrhea, abdominal pain  MUSCULOSKELETAL: No joint stiffness or swelling. Denies back pain.  NEUROLOGIC: No history of seizures, " paralysis, alteration of gait or coordination.  PSYCHIATRIC: Denies mood swings, depression or suicidal thoughts.    PE:   APPEARANCE:  Brief physical exam, obese, Well nourished, well developed, in moderate distress.  Temp 98.7°, pulse ox 97%  V/S: Reviewed.  SKIN: Normal skin turgor, no lesions.  HEENT:  TM's poor light reflex bilateral, no facial tenderness.  Bilateral sclera with minimal injection  CHEST:  No respiratory symptoms  CARDIOVASCULAR: Regular rate and rhythm.  NEUROLOGIC: No sensory deficits. Gait & Posture: Normal, No cerebellar signs.  MENTAL STATUS: Patient alert, oriented x 3 & conversant.    PLAN: Lab work POCT UA, COVID-19, Accu-Chek 308  Advise go to ER if SOB, increase blood sugar, chest pain  Advise increase p.o. fluids--water/juice & rest  Meds:  Zofran and Diflucan / no refills  Advise avoid dairy products  Simply saline nasal wash to irrigate sinuses and for congestion/runny nose.  Cool mist humidifier/vaporizer.  Practice good handwashing.  Advise use of green tea with honey  Tylenol or Ibuprofen for fever, headache and body aches.  Warm salt water gargles for throat comfort.  Chloraseptic spray or lozenges for throat comfort.  Advise follow up with PCP in 2-3 days for recheck  Advise go to ER if symptoms worsen or fail to improve with treatment.  Instructions, follow up, and supportive care as per AVS.  AVS provided and reviewed with patient including supportive care, follow up, and red flag symptoms.   Patient verbalizes understanding and agrees with treatment plan. Discharged from Urgent Care in stable condition.  · .Stay home except to get medical care.  · Separate yourself from other people and animals in your home  · Call ahead before visiting your doctor.  · Wear a facemask.  · Cover your coughs and sneezes.  · Clean your hands often.  · Avoid sharing personal household items.  · Clean all high-touch surfaces every day.  · Monitor your symptoms. Seek prompt medical attention if  your illness is worsening (e.g., difficulty breathing). Before seeking care, call your healthcare provider.  · If you have a medical emergency and need to call 911, notify the dispatch personnel that you have, or are being evaluated for COVID-19. If possible, put on a facemask before emergency medical services arrive.  · Discontinuing home isolation. Call your provider about guidance to discontinue home isolation.     Recommended precautions for household members, intimate partners, and caregivers in a non healthcare setting of a patient with symptomatic laboratory-confirmed COVID-19 or a patient under investigation.  Household members, intimate partners, and caregivers in a non healthcare setting may have close contact with a person with symptomatic, laboratory-confirmed COVID-19 or a person under investigation. Close contacts should monitor their health; they should call their healthcare provider right away if they develop symptoms suggestive of COVID-19 (e.g., fever, cough, shortness of breath).     Discussed elevated blood sugar am need for further evaluation at emergency room/patient deferred.  Patient will use her sliding scale of insulin on arrival at home.    DIAGNOSIS:  Fever with chills  Body aches  Diarrhea  Candidiasis  Morbid obesity  Nausea and vomiting  Type 2 diabetes mellitus with Hyperglycemia

## 2020-06-25 ENCOUNTER — TELEPHONE (OUTPATIENT)
Dept: INTERNAL MEDICINE | Facility: CLINIC | Age: 50
End: 2020-06-25

## 2020-06-25 ENCOUNTER — OFFICE VISIT (OUTPATIENT)
Dept: INTERNAL MEDICINE | Facility: CLINIC | Age: 50
End: 2020-06-25
Payer: MEDICAID

## 2020-06-25 DIAGNOSIS — E78.5 DYSLIPIDEMIA ASSOCIATED WITH TYPE 2 DIABETES MELLITUS: Chronic | ICD-10-CM

## 2020-06-25 DIAGNOSIS — E11.69 DYSLIPIDEMIA ASSOCIATED WITH TYPE 2 DIABETES MELLITUS: Chronic | ICD-10-CM

## 2020-06-25 DIAGNOSIS — Z79.4 TYPE 2 DIABETES MELLITUS WITH HYPERGLYCEMIA, WITH LONG-TERM CURRENT USE OF INSULIN: Primary | Chronic | ICD-10-CM

## 2020-06-25 DIAGNOSIS — F25.0 SCHIZOAFFECTIVE DISORDER, BIPOLAR TYPE: Chronic | ICD-10-CM

## 2020-06-25 DIAGNOSIS — E11.42 DIABETIC POLYNEUROPATHY ASSOCIATED WITH TYPE 2 DIABETES MELLITUS: Chronic | ICD-10-CM

## 2020-06-25 DIAGNOSIS — Z12.39 SCREENING FOR BREAST CANCER: ICD-10-CM

## 2020-06-25 DIAGNOSIS — E11.65 TYPE 2 DIABETES MELLITUS WITH HYPERGLYCEMIA, WITH LONG-TERM CURRENT USE OF INSULIN: Primary | Chronic | ICD-10-CM

## 2020-06-25 DIAGNOSIS — E66.01 MORBID OBESITY WITH BMI OF 45.0-49.9, ADULT: Chronic | ICD-10-CM

## 2020-06-25 DIAGNOSIS — K58.2 IRRITABLE BOWEL SYNDROME WITH BOTH CONSTIPATION AND DIARRHEA: Chronic | ICD-10-CM

## 2020-06-25 PROCEDURE — 99214 OFFICE O/P EST MOD 30 MIN: CPT | Mod: 95,,, | Performed by: FAMILY MEDICINE

## 2020-06-25 PROCEDURE — 99214 PR OFFICE/OUTPT VISIT, EST, LEVL IV, 30-39 MIN: ICD-10-PCS | Mod: 95,,, | Performed by: FAMILY MEDICINE

## 2020-06-25 NOTE — ASSESSMENT & PLAN NOTE
"Estimated body mass index is 46.21 kg/m² as calculated from the following:    Height as of 5/19/20: 4' 8" (1.422 m).    Weight as of 6/22/20: 93.5 kg (206 lb 2.1 oz).   Exacerbating factors include sedentary lifestyle and medication side effects.  Therapeutic lifestyle changes encouraged.  "

## 2020-06-25 NOTE — TELEPHONE ENCOUNTER
Spoke with pt regarding getting her blood sugar monitor.Pt was informed that she can come and  monitor today. Pt stated that she understood.//LB

## 2020-06-25 NOTE — ASSESSMENT & PLAN NOTE
Lab Results   Component Value Date    CHOL 126 02/18/2020    CHOL 142 04/25/2019    TRIG 152 (H) 02/18/2020    TRIG 328 (H) 04/25/2019    HDL 46 02/18/2020    HDL 36 (L) 04/25/2019    LDLCALC 49.6 (L) 02/18/2020    LDLCALC 40.4 (L) 04/25/2019    NONHDLCHOL 80 02/18/2020    NONHDLCHOL 106 04/25/2019    AST 25 04/19/2020    ALT 29 04/19/2020   She appears to be tolerating statin for dyslipidemia without apparent symptoms of myositis or hepatic dysfunction.

## 2020-06-25 NOTE — PROGRESS NOTES
TELEMEDICINE VIRTUAL VISIT    CHIEF COMPLAINT  Follow-up and Diabetes      DIAGNOSES, HISTORY, ASSESSMENT, AND PLAN  Assessment   1. Type 2 diabetes mellitus with hyperglycemia, with long-term current use of insulin    2. Schizoaffective disorder, bipolar type    3. Dyslipidemia associated with type 2 diabetes mellitus    4. Irritable bowel syndrome with both constipation and diarrhea    5. Morbid obesity with BMI of 45.0-49.9, adult    6. Diabetic polyneuropathy associated with type 2 diabetes mellitus    7. Screening for breast cancer         COVID-19 test pending.    Plan   Problem List Items Addressed This Visit        Unprioritized    Diabetic polyneuropathy associated with type 2 diabetes mellitus (Chronic)    Current Assessment & Plan     She is due soon for diabetic foot exam.  Podiatry consultation ordered.         Relevant Orders    Ambulatory referral/consult to Podiatry    Type 2 diabetes mellitus with hyperglycemia, with long-term current use of insulin - Primary (Chronic)    Current Assessment & Plan     Diabetes Management Status    Statin: Taking  ACE/ARB: Taking    Screening or Prevention Patient's value Goal Complete/Controlled?   HgA1C Testing and Control   Lab Results   Component Value Date    HGBA1C 7.1 (H) 02/18/2020      Annually/Less than 8% Yes   Lipid profile : 02/18/2020 Annually Yes   LDL control Lab Results   Component Value Date    LDLCALC 49.6 (L) 02/18/2020    Annually/Less than 100 mg/dl  Yes   Nephropathy screening Lab Results   Component Value Date    LABMICR 7.0 04/25/2019     Lab Results   Component Value Date    PROTEINUA Negative 04/19/2020    Annually Yes   Blood pressure BP Readings from Last 1 Encounters:   06/22/20 (!) 163/86    Less than 140/90 No   Dilated retinal exam : 05/22/2019 Annually No   Foot exam   : 05/16/2019 Annually No     Lab Results   Component Value Date    HGBA1C 7.1 (H) 02/18/2020    HGBA1C 9.9 (H) 11/18/2019    HGBA1C 8.4 (H) 10/14/2019    BRANDY  ">60 04/19/2020    EGFRNONAA >60 04/19/2020    MICALBCREAT 10.4 04/25/2019    LDLCALC 49.6 (L) 02/18/2020     HEALTH MAINTENANCE: Diabetic health maintenance interventions reviewed and are up to date except for:      Topic    Eye Exam     She reports that her blood glucose levels have been running higher, sometimes in the 300s.  She follows with endocrinologist Dr. Severino.  I ordered her enrollment in the diabetic digital medicine program, referral for diabetic eye exam, podiatry appointment for diabetic foot exam, hemoglobin A1c, and urine microalbumin.         Relevant Orders    Hemoglobin A1C    Microalbumin/creatinine urine ratio    Ambulatory referral/consult to Optometry    Schizoaffective disorder, bipolar type (Chronic)    Overview     PSYCHIATRIST: Dr. Sabillon         Current Assessment & Plan     Compensated/controlled and stable.         Dyslipidemia associated with type 2 diabetes mellitus (Chronic)    Current Assessment & Plan     Lab Results   Component Value Date    CHOL 126 02/18/2020    CHOL 142 04/25/2019    TRIG 152 (H) 02/18/2020    TRIG 328 (H) 04/25/2019    HDL 46 02/18/2020    HDL 36 (L) 04/25/2019    LDLCALC 49.6 (L) 02/18/2020    LDLCALC 40.4 (L) 04/25/2019    NONHDLCHOL 80 02/18/2020    NONHDLCHOL 106 04/25/2019    AST 25 04/19/2020    ALT 29 04/19/2020   She appears to be tolerating statin for dyslipidemia without apparent symptoms of myositis or hepatic dysfunction.          Relevant Orders    Lipid Panel    Morbid obesity with BMI of 45.0-49.9, adult (Chronic)    Current Assessment & Plan     Estimated body mass index is 46.21 kg/m² as calculated from the following:    Height as of 5/19/20: 4' 8" (1.422 m).    Weight as of 6/22/20: 93.5 kg (206 lb 2.1 oz).   Exacerbating factors include sedentary lifestyle and medication side effects.  Therapeutic lifestyle changes encouraged.         Irritable bowel syndrome with both constipation and diarrhea (Chronic)    Overview     Treated with " "Linzess since 2015.         Current Assessment & Plan     Worse over last several weeks, with increasing episodes of diarrhea.  She is requesting GI consultation.         Relevant Orders    Ambulatory referral/consult to Gastroenterology      Other Visit Diagnoses     Screening for breast cancer        Relevant Orders    Mammo Digital Screening Bilat w/ Arnel          Orders Placed This Encounter    Mammo Digital Screening Bilat w/ Arnel    Hemoglobin A1C    Lipid Panel    Microalbumin/creatinine urine ratio    Ambulatory referral/consult to Optometry    Ambulatory referral/consult to Podiatry    Ambulatory referral/consult to Gastroenterology    Diabetes Digital Medicine (DDMP) Enrollment Order    Diabetes Digital Medicine (DDMP): Assign Onboarding Questionnaires       Outpatient Medications Prior to Visit   Medication Sig Dispense Refill    albuterol (PROVENTIL) 2.5 mg /3 mL (0.083 %) nebulizer solution Take 3 mLs (2.5 mg total) by nebulization every 4 (four) hours. 180 mL 11    ALPRAZolam (XANAX) 2 MG Tab Take 1 tablet (2 mg total) by mouth 2 (two) times daily. 60 tablet 1    amLODIPine (NORVASC) 10 MG tablet Take 1 tablet (10 mg total) by mouth once daily. 30 tablet 3    ARIPiprazole (ABILIFY) 10 MG Tab Take 1 tablet by mouth at bedtime 30 tablet 2    ARIPiprazole (ABILIFY) 10 MG Tab Take 1 tablet (10 mg total) by mouth once daily. 30 tablet 2    aspirin (ECOTRIN) 81 MG EC tablet Take 81 mg by mouth once daily.      atorvastatin (LIPITOR) 20 MG tablet Take 1 tablet (20 mg total) by mouth every evening. 30 tablet 0    BD INSULIN SYRINGE ULTRA-FINE 1/2 mL 30 gauge x 1/2" Syrg   0    benztropine (COGENTIN) 1 MG tablet Take 1 tablet (1 mg total) by mouth every evening. 30 tablet 1    blood sugar diagnostic Strp Check blood glucose 4 times daily as directed and as needed (dispense insurance preferred brand or patient choice) 200 each 5    blood-glucose meter Misc Use to check blood sugars, give " meter based on insurance specification 1 each 1    cyclosporine 0.05 % Drop Place 1 drop into both eyes 2 (two) times daily. 5.5 mL 11    DULoxetine (CYMBALTA) 60 MG capsule Take 1 capsule (60 mg total) by mouth 2 (two) times daily. 60 capsule 1    ergocalciferol (ERGOCALCIFEROL) 50,000 unit Cap Take 1 capsule (50,000 Units total) by mouth every 14 (fourteen) days. 4 capsule 5    fish oil-omega-3 fatty acids 300-1,000 mg capsule Take 1 capsule by mouth once daily.      furosemide (LASIX) 20 MG tablet Take 1 tablet (20 mg total) by mouth once daily. 30 tablet 11    gabapentin (NEURONTIN) 300 MG capsule Take 2 capsules (600 mg total) by mouth 3 (three) times daily. 90 capsule 3    hydrALAZINE (APRESOLINE) 10 MG tablet Take 1 tablet (10 mg total) by mouth every 12 (twelve) hours. 60 tablet 11    insulin aspart U-100 (NOVOLOG FLEXPEN U-100 INSULIN) 100 unit/mL (3 mL) InPn pen Inject before meals three times a day up to 30 units daily 15 mL 3    lancets 28 gauge Misc use as directed 3 times daily 100 each 11    linaCLOtide (LINZESS) 145 mcg Cap capsule Take 1 capsule (145 mcg total) by mouth daily as needed (for constipation). 30 capsule 11    liraglutide 0.6 mg/0.1 mL, 18 mg/3 mL, subq PNIJ (VICTOZA 2-MILENA) 0.6 mg/0.1 mL (18 mg/3 mL) PnIj Use 1.8mg under the skin daily 9 mL 3    lurasidone 120 mg Tab Take 1 tablet (120 mg total) by mouth once daily at 5 pm. 30 tablet 1    magnesium oxide (MAG-OX) 400 mg (241.3 mg magnesium) tablet Take 1 tablet (400 mg total) by mouth once daily. 30 tablet 3    metFORMIN (GLUCOPHAGE) 1000 MG tablet TAKE 1 TABLET (1,000 MG TOTAL) BY MOUTH 2 (TWO) TIMES DAILY. 180 tablet 0    metoprolol succinate (TOPROL-XL) 100 MG 24 hr tablet Take 2 tablets (200 mg total) by mouth every evening. 180 tablet 3    mirtazapine (REMERON) 45 MG tablet Take 1 tablet (45 mg total) by mouth every evening. 30 tablet 1    mometasone (ASMANEX TWISTHALER) 220 mcg/ actuation (120) AePB Inhale 2 puffs  "into the lungs 2 (two) times daily. Controller 1 each 11    montelukast (SINGULAIR) 10 mg tablet Take 1 tablet (10 mg total) by mouth every evening. 30 tablet 11    MULTIVIT,CALC,MINS/IRON/FOLIC (DAILY MULTIPLE FOR WOMEN ORAL) Take 1 tablet by mouth once daily.      ondansetron (ZOFRAN-ODT) 4 MG TbDL Take 1 tablet (4 mg total) by mouth every 6 (six) hours as needed. 6 tablet 0    pen needle, diabetic (BD ULTRA-FINE MINI PEN NEEDLE) 31 gauge x 3/16" Ndle Use 5 a day 200 each 3    spironolactone (ALDACTONE) 25 MG tablet Take 1 tablet (25 mg total) by mouth once daily. 30 tablet 6    temazepam (RESTORIL) 15 mg Cap Take 1 capsule by mouth at bedtime 30 capsule 1    topiramate (TOPAMAX) 50 MG tablet Take 1 tablet (50 mg total) by mouth 2 (two) times daily. 60 tablet 11    TOUJEO MAX U-300 SOLOSTAR 300 unit/mL (3 mL) InPn Inject 90 units once daily at bedtime 15 mL 3    TOUJEO SOLOSTAR U-300 INSULIN 300 unit/mL (1.5 mL) InPn pen Inject 80 Units into the skin every evening. Needs 9 pens for 1 month so pharmacy please give adequate amount  and call office for questions 12 mL 3    valACYclovir (VALTREX) 1000 MG tablet Take 1 tablet (1,000 mg total) by mouth once daily. 30 tablet 11    valsartan (DIOVAN) 320 MG tablet Take 1 tablet (320 mg total) by mouth once daily. 90 tablet 3    amLODIPine (NORVASC) 5 MG tablet Take 1 tablet (5 mg total) by mouth every evening. 30 tablet 11    chlorproMAZINE (THORAZINE) 50 MG tablet Take 1 tablet (50 mg total) by mouth at bedtime 30 tablet 1    fluconazole (DIFLUCAN) 200 MG Tab Take 1 tablet (200 mg total) by mouth once daily. for 2 days 2 tablet 0    meclizine (ANTIVERT) 25 mg tablet Take 1 tablet by mouth every day as needed until directed to stop. 5 tablet 0    QUEtiapine (SEROQUEL) 50 MG tablet Take 1 tablet (50 mg total) by mouth every evening. 14 tablet 0    traMADoL (ULTRAM) 50 mg tablet Take 1 tablet (50 mg total) by mouth every 12 (twelve) hours as needed for " Pain. 14 tablet 0    triazolam (HALCION) 0.25 MG Tab Take 1 tablet by mouth at bedtime 30 tablet 2    zolpidem (AMBIEN) 10 mg Tab Take 1 tablet by mouth at bedtime 30 tablet 2    zolpidem (AMBIEN) 10 mg Tab Take 1 tablet (10 mg total) by mouth every evening. 30 tablet 2     No facility-administered medications prior to visit.         Medications Discontinued During This Encounter   Medication Reason    fluconazole (DIFLUCAN) 200 MG Tab Patient no longer taking    traMADoL (ULTRAM) 50 mg tablet Patient no longer taking    QUEtiapine (SEROQUEL) 50 MG tablet Patient no longer taking    zolpidem (AMBIEN) 10 mg Tab Patient no longer taking    zolpidem (AMBIEN) 10 mg Tab Patient no longer taking    amLODIPine (NORVASC) 5 MG tablet Patient no longer taking    chlorproMAZINE (THORAZINE) 50 MG tablet Patient no longer taking    meclizine (ANTIVERT) 25 mg tablet Patient no longer taking    triazolam (HALCION) 0.25 MG Tab Patient no longer taking             Follow up for any worsening or failure to improve, any new complaints or concerns.     Subjective   Review of Systems   Constitutional: Negative for fever.   Respiratory: Negative for shortness of breath.    Cardiovascular: Negative for chest pain.   Gastrointestinal: Positive for diarrhea and nausea. Negative for constipation and vomiting.   Genitourinary: Negative for hematuria.   Musculoskeletal: Positive for arthralgias and myalgias.        Objective   CONSTITUTIONAL: No apparent distress. Appears comfortable. Does not appear acutely ill or septic. Appears adequately hydrated.  CARDIOVASCULAR: No perioral cyanosis.  PULMONARY: Breathing unlabored. No audible wheezes. Easily speaks in full sentences.  PSYCHIATRIC: Alert and conversant and grossly oriented. Mood is grossly neutral. Affect appropriate. Judgment and insight grossly intact.     Documentation entered by me for this encounter may have been done in part using speech-recognition technology. Although  "I have made an effort to ensure accuracy, "sound like" errors may exist and should be interpreted in context. -DOMINIK Beach MD.    Visit Details: This visit was a telemedicine virtual visit with synchronous audio and video. Yolanda reported that her location at the time of this visit was in the [facility]. Yolanda chose and consented to receive these medical services by telemedicine. TOTAL TIME evaluating and managing this patient for this encounter exceeded 25 minutes, the majority spent counseling and coordinating care for the listed diagnoses.   "

## 2020-06-25 NOTE — TELEPHONE ENCOUNTER
----- Message from Bianca Agosto sent at 6/25/2020  8:19 AM CDT -----  Regarding: diabetes  Calling concerning her blood sugar monitor. Please call patient @ [phone]. thanks

## 2020-06-25 NOTE — PATIENT INSTRUCTIONS
Yolanda Tate.    It was good seeing you today for your virtual visit.    I ordered the lab tests, mammogram, and appointment referrals as we discussed.  They are listed below.    Please take the time right now to call our appointment desk at [phone] and schedule your labs, mammogram, and specialist appointments.    Thanks for letting me care for you, thanks for trusting us with your healthcare needs, and thanks for using [facility].    Sincerely,    DOMINIK Beach MD  Orders Placed This Encounter    Mammo Digital Screening Bilat w/ Arnel    Hemoglobin A1C    Lipid Panel    Microalbumin/creatinine urine ratio    Ambulatory referral/consult to Optometry    Ambulatory referral/consult to Podiatry    Ambulatory referral/consult to Gastroenterology    Diabetes Digital Medicine (DDMP) Enrollment Order    Diabetes Digital Medicine (DDMP): Assign Onboarding Questionnaires

## 2020-06-25 NOTE — ASSESSMENT & PLAN NOTE
Diabetes Management Status    Statin: Taking  ACE/ARB: Taking    Screening or Prevention Patient's value Goal Complete/Controlled?   HgA1C Testing and Control   Lab Results   Component Value Date    HGBA1C 7.1 (H) 02/18/2020      Annually/Less than 8% Yes   Lipid profile : 02/18/2020 Annually Yes   LDL control Lab Results   Component Value Date    LDLCALC 49.6 (L) 02/18/2020    Annually/Less than 100 mg/dl  Yes   Nephropathy screening Lab Results   Component Value Date    LABMICR 7.0 04/25/2019     Lab Results   Component Value Date    PROTEINUA Negative 04/19/2020    Annually Yes   Blood pressure BP Readings from Last 1 Encounters:   06/22/20 (!) 163/86    Less than 140/90 No   Dilated retinal exam : 05/22/2019 Annually No   Foot exam   : 05/16/2019 Annually No     Lab Results   Component Value Date    HGBA1C 7.1 (H) 02/18/2020    HGBA1C 9.9 (H) 11/18/2019    HGBA1C 8.4 (H) 10/14/2019    ESTGFRAFRICA >60 04/19/2020    EGFRNONAA >60 04/19/2020    MICALBCREAT 10.4 04/25/2019    LDLCALC 49.6 (L) 02/18/2020     HEALTH MAINTENANCE: Diabetic health maintenance interventions reviewed and are up to date except for:      Topic    Eye Exam     She reports that her blood glucose levels have been running higher, sometimes in the 300s.  She follows with endocrinologist Dr. Severino.  I ordered her enrollment in the diabetic digital medicine program, referral for diabetic eye exam, podiatry appointment for diabetic foot exam, hemoglobin A1c, and urine microalbumin.

## 2020-06-25 NOTE — ASSESSMENT & PLAN NOTE
Worse over last several weeks, with increasing episodes of diarrhea.  She is requesting GI consultation.

## 2020-06-26 ENCOUNTER — TELEPHONE (OUTPATIENT)
Dept: INTERNAL MEDICINE | Facility: CLINIC | Age: 50
End: 2020-06-26

## 2020-06-26 ENCOUNTER — TELEPHONE (OUTPATIENT)
Dept: PULMONOLOGY | Facility: CLINIC | Age: 50
End: 2020-06-26

## 2020-06-26 ENCOUNTER — HOSPITAL ENCOUNTER (EMERGENCY)
Facility: HOSPITAL | Age: 50
Discharge: HOME | End: 2020-06-26
Attending: FAMILY MEDICINE
Payer: MEDICAID

## 2020-06-26 ENCOUNTER — OFFICE VISIT (OUTPATIENT)
Dept: PULMONOLOGY | Facility: CLINIC | Age: 50
End: 2020-06-26
Payer: MEDICAID

## 2020-06-26 ENCOUNTER — NURSE TRIAGE (OUTPATIENT)
Dept: ADMINISTRATIVE | Facility: CLINIC | Age: 50
End: 2020-06-26

## 2020-06-26 ENCOUNTER — HOSPITAL ENCOUNTER (OUTPATIENT)
Dept: RADIOLOGY | Facility: HOSPITAL | Age: 50
Discharge: HOME | End: 2020-06-26
Attending: INTERNAL MEDICINE
Payer: MEDICAID

## 2020-06-26 VITALS
SYSTOLIC BLOOD PRESSURE: 122 MMHG | WEIGHT: 207.69 LBS | TEMPERATURE: 98 F | RESPIRATION RATE: 15 BRPM | HEIGHT: 56 IN | OXYGEN SATURATION: 98 % | DIASTOLIC BLOOD PRESSURE: 84 MMHG | BODY MASS INDEX: 46.72 KG/M2 | HEART RATE: 86 BPM

## 2020-06-26 VITALS
BODY MASS INDEX: 46.76 KG/M2 | SYSTOLIC BLOOD PRESSURE: 126 MMHG | HEART RATE: 101 BPM | HEIGHT: 56 IN | WEIGHT: 207.88 LBS | OXYGEN SATURATION: 96 % | DIASTOLIC BLOOD PRESSURE: 82 MMHG | RESPIRATION RATE: 19 BRPM

## 2020-06-26 DIAGNOSIS — R07.9 CHEST PAIN, UNSPECIFIED TYPE: Primary | ICD-10-CM

## 2020-06-26 DIAGNOSIS — R07.9 CHEST PAIN: ICD-10-CM

## 2020-06-26 DIAGNOSIS — J45.30 MILD PERSISTENT ASTHMA WITHOUT COMPLICATION: ICD-10-CM

## 2020-06-26 LAB
ALBUMIN SERPL BCP-MCNC: 4 G/DL (ref 3.5–5.2)
ALP SERPL-CCNC: 122 U/L (ref 55–135)
ALT SERPL W/O P-5'-P-CCNC: 56 U/L (ref 10–44)
ANION GAP SERPL CALC-SCNC: 12 MMOL/L (ref 8–16)
AST SERPL-CCNC: 59 U/L (ref 10–40)
BACTERIA #/AREA URNS HPF: NORMAL /HPF
BASOPHILS # BLD AUTO: 0.07 K/UL (ref 0–0.2)
BASOPHILS NFR BLD: 0.7 % (ref 0–1.9)
BILIRUB SERPL-MCNC: 0.2 MG/DL (ref 0.1–1)
BILIRUB UR QL STRIP: NEGATIVE
BNP SERPL-MCNC: <10 PG/ML (ref 0–99)
BUN SERPL-MCNC: 11 MG/DL (ref 6–20)
CALCIUM SERPL-MCNC: 9.9 MG/DL (ref 8.7–10.5)
CHLORIDE SERPL-SCNC: 99 MMOL/L (ref 95–110)
CLARITY UR: CLEAR
CO2 SERPL-SCNC: 23 MMOL/L (ref 23–29)
COLOR UR: YELLOW
CREAT SERPL-MCNC: 0.8 MG/DL (ref 0.5–1.4)
DIFFERENTIAL METHOD: ABNORMAL
EOSINOPHIL # BLD AUTO: 0.2 K/UL (ref 0–0.5)
EOSINOPHIL NFR BLD: 1.9 % (ref 0–8)
ERYTHROCYTE [DISTWIDTH] IN BLOOD BY AUTOMATED COUNT: 14.1 % (ref 11.5–14.5)
EST. GFR  (AFRICAN AMERICAN): >60 ML/MIN/1.73 M^2
EST. GFR  (NON AFRICAN AMERICAN): >60 ML/MIN/1.73 M^2
GLUCOSE SERPL-MCNC: 271 MG/DL (ref 70–110)
GLUCOSE UR QL STRIP: ABNORMAL
HCT VFR BLD AUTO: 40.2 % (ref 37–48.5)
HGB BLD-MCNC: 12.2 G/DL (ref 12–16)
HGB UR QL STRIP: NEGATIVE
IMM GRANULOCYTES # BLD AUTO: 0.05 K/UL (ref 0–0.04)
IMM GRANULOCYTES NFR BLD AUTO: 0.5 % (ref 0–0.5)
INR PPP: 1 (ref 0.8–1.2)
KETONES UR QL STRIP: NEGATIVE
LEUKOCYTE ESTERASE UR QL STRIP: NEGATIVE
LYMPHOCYTES # BLD AUTO: 3.3 K/UL (ref 1–4.8)
LYMPHOCYTES NFR BLD: 34.7 % (ref 18–48)
MCH RBC QN AUTO: 27.9 PG (ref 27–31)
MCHC RBC AUTO-ENTMCNC: 30.3 G/DL (ref 32–36)
MCV RBC AUTO: 92 FL (ref 82–98)
MICROSCOPIC COMMENT: NORMAL
MONOCYTES # BLD AUTO: 0.5 K/UL (ref 0.3–1)
MONOCYTES NFR BLD: 4.8 % (ref 4–15)
NEUTROPHILS # BLD AUTO: 5.4 K/UL (ref 1.8–7.7)
NEUTROPHILS NFR BLD: 57.4 % (ref 38–73)
NITRITE UR QL STRIP: NEGATIVE
NRBC BLD-RTO: 0 /100 WBC
PH UR STRIP: 6 [PH] (ref 5–8)
PLATELET # BLD AUTO: 301 K/UL (ref 150–350)
PMV BLD AUTO: 9.9 FL (ref 9.2–12.9)
POTASSIUM SERPL-SCNC: 5.1 MMOL/L (ref 3.5–5.1)
PROT SERPL-MCNC: 8 G/DL (ref 6–8.4)
PROT UR QL STRIP: NEGATIVE
PROTHROMBIN TIME: 10.9 SEC (ref 9–12.5)
RBC # BLD AUTO: 4.37 M/UL (ref 4–5.4)
SARS-COV-2 RNA RESP QL NAA+PROBE: NOT DETECTED
SODIUM SERPL-SCNC: 134 MMOL/L (ref 136–145)
SP GR UR STRIP: 1.02 (ref 1–1.03)
SQUAMOUS #/AREA URNS HPF: 6 /HPF
TROPONIN I SERPL DL<=0.01 NG/ML-MCNC: 0.01 NG/ML (ref 0–0.03)
URN SPEC COLLECT METH UR: ABNORMAL
UROBILINOGEN UR STRIP-ACNC: NEGATIVE EU/DL
WBC # BLD AUTO: 9.4 K/UL (ref 3.9–12.7)
WBC #/AREA URNS HPF: 1 /HPF (ref 0–5)
YEAST URNS QL MICRO: NORMAL

## 2020-06-26 PROCEDURE — 99214 OFFICE O/P EST MOD 30 MIN: CPT | Mod: S$PBB,,, | Performed by: INTERNAL MEDICINE

## 2020-06-26 PROCEDURE — 71046 XR CHEST PA AND LATERAL: ICD-10-PCS | Mod: 26,,, | Performed by: RADIOLOGY

## 2020-06-26 PROCEDURE — 83880 ASSAY OF NATRIURETIC PEPTIDE: CPT

## 2020-06-26 PROCEDURE — 99999 PR PBB SHADOW E&M-EST. PATIENT-LVL V: ICD-10-PCS | Mod: PBBFAC,,, | Performed by: INTERNAL MEDICINE

## 2020-06-26 PROCEDURE — 80053 COMPREHEN METABOLIC PANEL: CPT

## 2020-06-26 PROCEDURE — 99285 EMERGENCY DEPT VISIT HI MDM: CPT | Mod: 25,27

## 2020-06-26 PROCEDURE — 94640 AIRWAY INHALATION TREATMENT: CPT

## 2020-06-26 PROCEDURE — 93005 ELECTROCARDIOGRAM TRACING: CPT

## 2020-06-26 PROCEDURE — 99214 PR OFFICE/OUTPT VISIT, EST, LEVL IV, 30-39 MIN: ICD-10-PCS | Mod: S$PBB,,, | Performed by: INTERNAL MEDICINE

## 2020-06-26 PROCEDURE — 84484 ASSAY OF TROPONIN QUANT: CPT

## 2020-06-26 PROCEDURE — 93010 EKG 12-LEAD: ICD-10-PCS | Mod: ,,, | Performed by: INTERNAL MEDICINE

## 2020-06-26 PROCEDURE — 81000 URINALYSIS NONAUTO W/SCOPE: CPT

## 2020-06-26 PROCEDURE — 96372 THER/PROPH/DIAG INJ SC/IM: CPT

## 2020-06-26 PROCEDURE — 71046 X-RAY EXAM CHEST 2 VIEWS: CPT | Mod: 26,,, | Performed by: RADIOLOGY

## 2020-06-26 PROCEDURE — 85610 PROTHROMBIN TIME: CPT

## 2020-06-26 PROCEDURE — 85025 COMPLETE CBC W/AUTO DIFF WBC: CPT

## 2020-06-26 PROCEDURE — 71046 X-RAY EXAM CHEST 2 VIEWS: CPT | Mod: TC

## 2020-06-26 PROCEDURE — 25000242 PHARM REV CODE 250 ALT 637 W/ HCPCS: Performed by: FAMILY MEDICINE

## 2020-06-26 PROCEDURE — 99999 PR PBB SHADOW E&M-EST. PATIENT-LVL V: CPT | Mod: PBBFAC,,, | Performed by: INTERNAL MEDICINE

## 2020-06-26 PROCEDURE — 63600175 PHARM REV CODE 636 W HCPCS: Performed by: FAMILY MEDICINE

## 2020-06-26 PROCEDURE — 99215 OFFICE O/P EST HI 40 MIN: CPT | Mod: PBBFAC,25 | Performed by: INTERNAL MEDICINE

## 2020-06-26 PROCEDURE — 93010 ELECTROCARDIOGRAM REPORT: CPT | Mod: ,,, | Performed by: INTERNAL MEDICINE

## 2020-06-26 RX ORDER — ORPHENADRINE CITRATE 30 MG/ML
60 INJECTION INTRAMUSCULAR; INTRAVENOUS
Status: COMPLETED | OUTPATIENT
Start: 2020-06-26 | End: 2020-06-26

## 2020-06-26 RX ORDER — IPRATROPIUM BROMIDE AND ALBUTEROL SULFATE 2.5; .5 MG/3ML; MG/3ML
3 SOLUTION RESPIRATORY (INHALATION)
Status: COMPLETED | OUTPATIENT
Start: 2020-06-26 | End: 2020-06-26

## 2020-06-26 RX ADMIN — ORPHENADRINE CITRATE 60 MG: 30 INJECTION INTRAMUSCULAR; INTRAVENOUS at 06:06

## 2020-06-26 RX ADMIN — IPRATROPIUM BROMIDE AND ALBUTEROL SULFATE 3 ML: .5; 3 SOLUTION RESPIRATORY (INHALATION) at 06:06

## 2020-06-26 NOTE — TELEPHONE ENCOUNTER
Spoke with pt and she state that she is have pain when she inhale. Pt states that she do not want to go to [facility] Pt states that she will be seen today by .//GWENDOLYN

## 2020-06-26 NOTE — TELEPHONE ENCOUNTER
----- Message from Ellen Ashby sent at 6/26/2020 12:29 PM CDT -----  Regarding: Patient  The patient would like to consult with the nurse. She stated that she feels a lot of pain when breathing in. Please call back at [phone] (home)

## 2020-06-26 NOTE — TELEPHONE ENCOUNTER
----- Message from Ruby Caballero sent at 6/26/2020  1:32 PM CDT -----  Regarding: Medical Advice  Contact: Patient  Type:  Needs Medical Advice    Who Called: Yolanda   Symptoms (please be specific): hurts when pt inhales   How long has patient had these symptoms:  1 day   Pharmacy name and phone #:    Would the patient rather a call back or a response via [facility]? Call back   Best Call Back Number: [phone]  Additional Information: Pt is requesting a call back from the nurse   Pt stated it only hurts when pt inhales no pain in throat fever etc

## 2020-06-26 NOTE — ED NOTES
Pt ambulated to restroom to collect urine with no distress noted. Pt hooked up to monitor and Rn will continue to monitor. Call light within reach and VVS.

## 2020-06-26 NOTE — TELEPHONE ENCOUNTER
----- Message from Rashmi Albrecht sent at 6/26/2020  1:40 PM CDT -----  Please call pt @ [phone] regarding pain in chest, pt states she talked with nurse on call already, pt need to know what should she do.

## 2020-06-26 NOTE — TELEPHONE ENCOUNTER
----- Message from Bobo Tapia sent at 6/26/2020  1:11 PM CDT -----  Regarding: Urgent patient advice  Type:  Needs Medical Advice    Who Called: Pt   Symptoms (please be specific):  severe pain in her chest every time she inhales    How long has patient had these symptoms:   today   Pharmacy name and phone #:  n/a  Would the patient rather a call back or a response via [facility]? Call back   Best Call Back Number: [phone] ([place])   Additional Information: The patient is currently waiting to speak to a on-call nurse, please advise.

## 2020-06-26 NOTE — TELEPHONE ENCOUNTER
Spoke with pt regarding message left.Pt stated that she is at doctor visit and everything is okay .//LB

## 2020-06-26 NOTE — ED PROVIDER NOTES
SCRIBE #1 NOTE: IMario, am scribing for, and in the presence of, Roopa Dupree MD. I have scribed the entire note.      History      Chief Complaint   Patient presents with    Chest Pain     patient reports chest pain with inspiration was sent from [facility]       Review of patient's allergies indicates:   Allergen Reactions    Codeine Itching    Ibuprofen     Neuromuscular blockers, steroidal Hives     some    Penicillins     Sulfamethoxazole-trimethoprim     Latex, natural rubber Rash    Morphine Rash     itching    Norco [hydrocodone-acetaminophen] Itching, Rash and Hallucinations    Seconal [secobarbital sodium] Rash     itching    Tylox [oxycodone-acetaminophen] Rash        HPI   HPI    6/26/2020, 5:29 PM   History obtained from the patient      History of Present Illness: Yolanda Betts is a 50 y.o. female patient who presents to the Emergency Department for R anterior and posterior chest pain, onset 11 AM this morning. Symptoms are constant and moderate in severity. No mitigating or exacerbating factors reported. No associated sxs reported. Patient denies any fever, chills, n/v/d, SOB, weakness, numbness, dizziness, headache, and all other sxs at this time. No prior Tx reported. No further complaints or concerns at this time.     Arrival mode: Personal vehicle    PCP: CHARLA Beach MD       Past Medical History:  Past Medical History:   Diagnosis Date    Abnormal Pap smear of cervix     HPV genital warts    Anemia     Anxiety     Arthritis     Asthma 10/11/2016    Bipolar 1 disorder     Diabetes mellitus, type 2     Dyslipidemia associated with type 2 diabetes mellitus 5/13/2019    Genital warts     GERD (gastroesophageal reflux disease)     Herpes simplex virus (HSV) infection     Hypertension     Hypertension complicating diabetes 5/5/2019    Migraine with aura and without status migrainosus, not intractable 3/21/2016    Mild persistent asthma without  "complication 10/11/2016    Morbid obesity with body mass index (BMI) of 45.0 to 49.9 in adult 8/3/2017    Obstructive sleep apnea     ALEN (obstructive sleep apnea)     2L per N/C q HS    Schizoaffective disorder, bipolar type 2019    Seasonal allergic rhinitis due to pollen 2019    Type 2 diabetes mellitus with hyperglycemia, with long-term current use of insulin 2017    Type 2 diabetes mellitus with hyperglycemia, without long-term current use of insulin 2017       Past Surgical History:  Past Surgical History:   Procedure Laterality Date    abcess removed right back      BELT ABDOMINOPLASTY      BREAST SURGERY Bilateral     Reduction     SECTION      X 2    COLONOSCOPY      COLONOSCOPY N/A 2018    Procedure: colonoscopy for iron deficiency anemia;  Surgeon: Frankie Sanchez MD;  Location: [facility] ENDO;  Service: Endoscopy;  Laterality: N/A;    COLONOSCOPY N/A 2018    Procedure: COLONOSCOPY;  Surgeon: Frankie Sanchez MD;  Location: [facility] ENDO;  Service: Endoscopy;  Laterality: N/A;    HYSTERECTOMY      w/ BSO; hypermenorrhea    MOUTH SURGERY      OOPHORECTOMY      hyst/bso, hypermenorrhea    ROBOT-ASSISTED CHOLECYSTECTOMY USING DA DARI XI N/A 1/3/2020    Procedure: XI ROBOTIC CHOLECYSTECTOMY;  Surgeon: Damir Driscoll MD;  Location: [facility] OR;  Service: General;  Laterality: N/A;    TUBAL LIGATION           Family History:  Family History   Problem Relation Age of Onset    Diabetes Mother     Hyperlipidemia Mother     Hypertension Mother     Asthma Mother     COPD Mother     Glaucoma Mother     Thyroid disease Mother     Anesthesia problems Mother         "almost had a cardiac arrest" , blood clots    Hypertension Father     Hyperlipidemia Father     Cancer Father         Brain, lung, liver, kidney    Heart disease Maternal Grandmother     Hyperlipidemia Maternal Grandmother     Hypertension Maternal Grandmother     " Cataracts Maternal Grandmother     Diabetes Maternal Grandmother     Heart disease Maternal Grandfather     Hyperlipidemia Maternal Grandfather     Hypertension Maternal Grandfather     Glaucoma Maternal Grandfather     Cancer Maternal Grandfather     Cataracts Maternal Grandfather     Macular degeneration Maternal Grandfather     Diabetes Maternal Grandfather     Heart disease Paternal Grandmother     Hyperlipidemia Paternal Grandmother     Hypertension Paternal Grandmother     Cataracts Paternal Grandmother     Heart disease Paternal Grandfather     Hyperlipidemia Paternal Grandfather     Hypertension Paternal Grandfather     Cataracts Paternal Grandfather     Breast cancer Maternal Cousin     Breast cancer Maternal Cousin     Breast cancer Maternal Cousin     Breast cancer Maternal Cousin        Social History:  Social History     Tobacco Use    Smoking status: Never Smoker    Smokeless tobacco: Never Used   Substance and Sexual Activity    Alcohol use: Yes     Alcohol/week: 0.0 standard drinks     Comment: socially  No alcohol 72h prior to sx    Drug use: No    Sexual activity: Not Currently     Partners: Male       ROS   Review of Systems   Constitutional: Negative for chills, diaphoresis, fatigue and fever.   HENT: Negative for sore throat.    Respiratory: Negative for shortness of breath.    Cardiovascular: Positive for chest pain (R).   Gastrointestinal: Negative for diarrhea, nausea and vomiting.   Genitourinary: Negative for dysuria.   Musculoskeletal: Negative for back pain.   Skin: Negative for rash.   Neurological: Negative for dizziness, weakness, light-headedness, numbness and headaches.   Hematological: Does not bruise/bleed easily.   All other systems reviewed and are negative.    Physical Exam      Initial Vitals [06/26/20 1610]   BP Pulse Resp Temp SpO2   (!) 142/86 95 18 98.3 °F (36.8 °C) 97 %      MAP       --          Physical Exam  Nursing Notes and Vital Signs  "Reviewed.  Constitutional: Patient is in no acute distress. Well-developed and well-nourished.  Head: Atraumatic. Normocephalic.  Eyes: PERRL. EOM intact. Conjunctivae are not pale. No scleral icterus.  ENT: Mucous membranes are moist. Oropharynx is clear and symmetric.    Neck: Supple. Full ROM. No lymphadenopathy.  Cardiovascular: Regular rate. Regular rhythm. No murmurs, rubs, or gallops. Distal pulses are 2+ and symmetric.  Pulmonary/Chest: R anterior and posterior chest wall tenderness. No respiratory distress. Clear to auscultation bilaterally. No wheezing or rales.  Abdominal: Soft and non-distended.  There is no tenderness.  No rebound, guarding, or rigidity.   Musculoskeletal: Moves all extremities. No obvious deformities. No edema.  Skin: Warm and dry.  Neurological:  Alert, awake, and appropriate.  Normal speech.  No acute focal neurological deficits are appreciated.  Psychiatric: Normal affect. Good eye contact. Appropriate in content.    ED Course    Procedures  ED Vital Signs:  Vitals:    06/26/20 1610 06/26/20 1701 06/26/20 1747 06/26/20 1832   BP: (!) 142/86 128/76 126/80    Pulse: 95 91 89 84   Resp: 18 17 20 13   Temp: 98.3 °F (36.8 °C)      TempSrc: Oral      SpO2: 97% 97% 96% 99%   Weight: 94.2 kg (207 lb 10.8 oz)      Height: 4' 8" (1.422 m)       06/26/20 1837   BP: 122/84   Pulse: 86   Resp: 15   Temp:    TempSrc:    SpO2: 98%   Weight:    Height:        Abnormal Lab Results:  Labs Reviewed   CBC W/ AUTO DIFFERENTIAL - Abnormal; Notable for the following components:       Result Value    Mean Corpuscular Hemoglobin Conc 30.3 (*)     Immature Grans (Abs) 0.05 (*)     All other components within normal limits   COMPREHENSIVE METABOLIC PANEL - Abnormal; Notable for the following components:    Sodium 134 (*)     Glucose 271 (*)     AST 59 (*)     ALT 56 (*)     All other components within normal limits   URINALYSIS - Abnormal; Notable for the following components:    Glucose, UA 3+ (*)     All " other components within normal limits   TROPONIN I   B-TYPE NATRIURETIC PEPTIDE   PROTIME-INR   URINALYSIS MICROSCOPIC        All Lab Results:  Results for orders placed or performed during the hospital encounter of 06/26/20   CBC auto differential   Result Value Ref Range    WBC 9.40 3.90 - 12.70 K/uL    RBC 4.37 4.00 - 5.40 M/uL    Hemoglobin 12.2 12.0 - 16.0 g/dL    Hematocrit 40.2 37.0 - 48.5 %    Mean Corpuscular Volume 92 82 - 98 fL    Mean Corpuscular Hemoglobin 27.9 27.0 - 31.0 pg    Mean Corpuscular Hemoglobin Conc 30.3 (L) 32.0 - 36.0 g/dL    RDW 14.1 11.5 - 14.5 %    Platelets 301 150 - 350 K/uL    MPV 9.9 9.2 - 12.9 fL    Immature Granulocytes 0.5 0.0 - 0.5 %    Gran # (ANC) 5.4 1.8 - 7.7 K/uL    Immature Grans (Abs) 0.05 (H) 0.00 - 0.04 K/uL    Lymph # 3.3 1.0 - 4.8 K/uL    Mono # 0.5 0.3 - 1.0 K/uL    Eos # 0.2 0.0 - 0.5 K/uL    Baso # 0.07 0.00 - 0.20 K/uL    nRBC 0 0 /100 WBC    Gran% 57.4 38.0 - 73.0 %    Lymph% 34.7 18.0 - 48.0 %    Mono% 4.8 4.0 - 15.0 %    Eosinophil% 1.9 0.0 - 8.0 %    Basophil% 0.7 0.0 - 1.9 %    Differential Method Automated    Comprehensive metabolic panel   Result Value Ref Range    Sodium 134 (L) 136 - 145 mmol/L    Potassium 5.1 3.5 - 5.1 mmol/L    Chloride 99 95 - 110 mmol/L    CO2 23 23 - 29 mmol/L    Glucose 271 (H) 70 - 110 mg/dL    BUN, Bld 11 6 - 20 mg/dL    Creatinine 0.8 0.5 - 1.4 mg/dL    Calcium 9.9 8.7 - 10.5 mg/dL    Total Protein 8.0 6.0 - 8.4 g/dL    Albumin 4.0 3.5 - 5.2 g/dL    Total Bilirubin 0.2 0.1 - 1.0 mg/dL    Alkaline Phosphatase 122 55 - 135 U/L    AST 59 (H) 10 - 40 U/L    ALT 56 (H) 10 - 44 U/L    Anion Gap 12 8 - 16 mmol/L    eGFR if African American >60 >60 mL/min/1.73 m^2    eGFR if non African American >60 >60 mL/min/1.73 m^2   Urinalysis - Clean Catch   Result Value Ref Range    Specimen UA Urine, Clean Catch     Color, UA Yellow Yellow, Straw, Milagros    Appearance, UA Clear Clear    pH, UA 6.0 5.0 - 8.0    Specific Gravity, UA 1.020 1.005 -  1.030    Protein, UA Negative Negative    Glucose, UA 3+ (A) Negative    Ketones, UA Negative Negative    Bilirubin (UA) Negative Negative    Occult Blood UA Negative Negative    Nitrite, UA Negative Negative    Urobilinogen, UA Negative <2.0 EU/dL    Leukocytes, UA Negative Negative   Troponin I   Result Value Ref Range    Troponin I 0.007 0.000 - 0.026 ng/mL   B-Type natriuretic peptide (BNP)   Result Value Ref Range    BNP <10 0 - 99 pg/mL   Protime-INR   Result Value Ref Range    Prothrombin Time 10.9 9.0 - 12.5 sec    INR 1.0 0.8 - 1.2   Urinalysis Microscopic   Result Value Ref Range    WBC, UA 1 0 - 5 /hpf    Bacteria Occasional None-Occ /hpf    Yeast, UA None None    Squam Epithel, UA 6 /hpf    Microscopic Comment SEE COMMENT      Imaging Results:  Imaging Results          X-Ray Chest AP Portable (Final result)  Result time 06/26/20 17:53:46    Final result by SELNIA Martinez Sr., MD (06/26/20 17:53:46)                 Impression:      Normal study.      Electronically signed by: Randall Martinez MD  Date:    06/26/2020  Time:    17:53             Narrative:    EXAMINATION:  XR CHEST AP PORTABLE    CLINICAL HISTORY:  Chest Pain;    COMPARISON:  06/26/2020    FINDINGS:  The size of the heart is normal. The lungs are clear. There is no pneumothorax.  The costophrenic angles are sharp.                               The EKG was ordered, reviewed, and independently interpreted by the ED provider.  Interpretation time: 16:22  Rate: 94 BPM  Rhythm: normal sinus rhythm  Interpretation: Cannot rule out anterior infarct, age undetermined. No STEMI.           The Emergency Provider reviewed the vital signs and test results, which are outlined above.    ED Discussion     6:31 PM: Reassessed pt at this time. Discussed with pt all pertinent ED information and results. Discussed pt dx and plan of tx. Gave pt all f/u and return to the ED instructions. All questions and concerns were addressed at this time. Pt expresses  understanding of information and instructions, and is comfortable with plan to discharge. Pt is stable for discharge.    I have discussed with patient and/or family/caretaker chest pain precautions, specifically to return for worsening chest pain, shortness of breath, fever, or any concern.  I have low suspicion for cardiopulmonary, vascular, infectious, respiratory, or other emergent medical condition based on my evaluation in the ED.    I discussed with patient and/or family/caretaker that evaluation in the ED does not suggest any emergent or life threatening medical conditions requiring immediate intervention beyond what was provided in the ED, and I believe patient is safe for discharge.  Regardless, an unremarkable evaluation in the ED does not preclude the development or presence of a serious of life threatening condition. As such, patient was instructed to return immediately for any worsening or change in current symptoms.         ED Medication(s):  Medications   albuterol-ipratropium 2.5 mg-0.5 mg/3 mL nebulizer solution 3 mL (3 mLs Nebulization Given 6/26/20 1832)   orphenadrine injection 60 mg (60 mg Intramuscular Given 6/26/20 0578)       Follow-up Information     Schedule an appointment as soon as possible for a visit  with CHARLA Beach MD.    Specialty: Family Medicine  Contact information:  [id number]  [place] [address] [phone]                  Discharge Medication List as of 6/26/2020  6:30 PM            Medical Decision Making    Medical Decision Making:   Clinical Tests:   Lab Tests: Ordered and Reviewed  Radiological Study: Ordered and Reviewed  Medical Tests: Ordered and Reviewed           Scribe Attestation:   Scribe #1: I performed the above scribed service and the documentation accurately describes the services I performed. I attest to the accuracy of the note.    Attending:   Physician Attestation Statement for Scribe #1: I, Roopa Dupree MD, personally performed  the services described in this documentation, as scribed by Mario Solorio, in my presence, and it is both accurate and complete.          Clinical Impression       ICD-10-CM ICD-9-CM   1. Chest pain, unspecified type  R07.9 786.50   2. Chest pain  R07.9 786.50       Disposition:   Disposition: Discharged  Condition: Stable         Roopa Dupree MD  07/03/20 3211

## 2020-06-26 NOTE — TELEPHONE ENCOUNTER
Reports CP every time breathe in and goes through to back on right side. Rated as Severe. Pain is  all the time rated as severe but worse with exertion or breathing in. started: 1130am or 12pm today. No sweating.per protocol instructed pt to call 911. verbalizes understanding.      Reason for Disposition   SEVERE chest pain   Chest pain lasting longer than 5 minutes and ANY of the following:* Over 50 years old* Over 30 years old and at least one cardiac risk factor (i.e., high blood pressure, diabetes, high cholesterol, obesity, smoker or strong family history of heart disease)* Pain is crushing, pressure-like, or heavy * Took nitroglycerin and chest pain was not relieved* History of heart disease (i.e., angina, heart attack, bypass surgery, angioplasty, CHF)    Additional Information   Negative: Severe difficulty breathing (e.g., struggling for each breath, speaks in single words)   Negative: Passed out (i.e., fainted, collapsed and was not responding)   Negative: Sounds like a life-threatening emergency to the triager   Negative: Visible sweat on face or sweat dripping down face    Protocols used: CHEST PAIN-A-OH

## 2020-06-26 NOTE — PROGRESS NOTES
Subjective:       Patient ID: Yolanda Betts is a 50 y.o. female.  Patient Active Problem List   Diagnosis    GERD (gastroesophageal reflux disease)    Allergic rhinitis    Menopausal syndrome (hot flashes)    Migraine with aura and without status migrainosus, not intractable    Abnormal ECG    Sleep-related bruxism    Diabetic polyneuropathy associated with type 2 diabetes mellitus    Mild persistent asthma without complication    Bipolar 1 disorder    Diffusion capacity of lung (dl), decreased    Hypertriglyceridemia    Anxiety    Type 2 diabetes mellitus with hyperglycemia, with long-term current use of insulin    Iron deficiency anemia    Screen for colon cancer    Schizoaffective disorder, bipolar type    Vitamin D deficiency    Fibromyalgia    Hypertension complicating diabetes    Dyslipidemia associated with type 2 diabetes mellitus    Seasonal allergic rhinitis due to pollen    Nocturnal hypoxemia due to obesity - WITHOUT ALEN    Obesity hypoventilation syndrome    Hyperaldosteronism    Morbid obesity with BMI of 45.0-49.9, adult    Calculus of gallbladder without cholecystitis without obstruction    Cholelithiasis    High risk of appointment cancellation or no-show    Irritable bowel syndrome with both constipation and diarrhea     Immunization History   Administered Date(s) Administered    Influenza 10/01/2018    Influenza - Quadrivalent - PF (6 months and older) 12/21/2017    Pneumococcal Polysaccharide - 23 Valent 01/31/2017    Tdap 12/21/2017      Social History     Tobacco Use   Smoking Status Never Smoker   Smokeless Tobacco Never Used    Asthma Control Test  In the past 4  weeks, how much of the time did your asthma keep you from getting as much done at work, school or at home?: Some of the time  During the past 4 weeks, how often have you had shortness of breath?: More than once a day  During the past 4 weeks, how often did your asthma symptoms (wheezing,  couging, shortness of breath, chest tightness or pain) wake you up at night or earlier than usual in the morning?: 2 or 3 nights a week  During the past 4 weeks, how often have you used your rescue inhaler or nebulizer medication (such as albuterol)?: Not at all  How would you rate your asthma control during the past 4 weeks?: Somewhat controlled  If your score is 19 or less, your asthma may not be under control: 14      Chief Complaint: Chest Pain    Ms. Betts is 50 y.o.   Follow-up visit.  [facility] pain stabing right side  Started 11 30 am  Has NTG at home did not take  Denies assocaiated asthma symptoms like wheezing, cough  Has ASTHMA and OXYGEN for QHS  CXR was unremarkable  No current respiratory symptoms: No cough, No wheezing, No recent exacebations  PFT showed normal spirometry,   Asthma: ACT score 14   Immunizations current  Medical regime reviewed:   CHRIS and Asmanex    Asthma  There is no shortness of breath, sputum production or wheezing. Associated symptoms include chest pain. Her past medical history is significant for asthma.   Chest Pain   This is a new problem. The current episode started today. The onset quality is sudden. The problem occurs constantly. The problem has been gradually worsening. Pain location: interscapular right side radiate to spine. The pain is at a severity of 10/10. The pain is severe. The quality of the pain is described as sharp. The pain radiates to the right shoulder. Pertinent negatives include no shortness of breath or sputum production. The pain is aggravated by deep breathing. Treatments tried: did not take NTG at home.         Review of Systems   Constitutional: Negative for weight gain.   HENT: Negative.    Eyes: Negative.    Respiratory: Negative.  Negative for snoring, sputum production, shortness of breath and wheezing.    Cardiovascular: Positive for chest pain.   Genitourinary: Negative.    Endocrine: endocrine negative  "  Musculoskeletal: Negative.    Skin: Negative.    Gastrointestinal: Negative.    Neurological: Negative.    Psychiatric/Behavioral: Negative for sleep disturbance.   All other systems reviewed and are negative.      Objective:       Vitals:    06/26/20 1443   BP: 126/82   Pulse: 101   Resp: 19   SpO2: 96%   Weight: 94.3 kg (207 lb 14.3 oz)   Height: 4' 8" (1.422 m)     Physical Exam   Constitutional: She is oriented to person, place, and time. She appears well-developed and well-nourished. She is obese.   HENT:   Head: Normocephalic.   Mouth/Throat: Oropharynx is clear and moist. No oropharyngeal exudate. Mallampati Score: II.   Neck 17"   Neck: Normal range of motion. Neck supple. No tracheal deviation present. No thyromegaly present.   Cardiovascular: Normal rate, regular rhythm and normal heart sounds.   No murmur heard.  Pulmonary/Chest: Normal expansion, symmetric chest wall expansion, effort normal and breath sounds normal. No respiratory distress. She has no decreased breath sounds. She has no wheezes. Negative for tactile fremitus.   Abdominal: Soft. Bowel sounds are normal.   Musculoskeletal: Normal range of motion.         General: No edema.   Lymphadenopathy:     She has no cervical adenopathy.     She has no axillary adenopathy.   Neurological: She is alert and oriented to person, place, and time. She has normal reflexes. Gait normal.   Skin: Skin is warm and dry.   Psychiatric: She has a normal mood and affect.   Nursing note and vitals reviewed.    Personal Diagnostic Review     CXR  EXAMINATION:  XR CHEST PA AND LATERAL     CLINICAL HISTORY:  Mild persistent asthma, uncomplicated     TECHNIQUE:  PA and lateral views of the chest were performed.     COMPARISON:  04/19/2020     FINDINGS:  Low lung volumes accentuate the heart size and pulmonary vasculature.  There may be mild cardiomegaly.    No definite parenchymal consolidation or pleural effusion visualized.  No acute osseous findings " demonstrated.     Impression:     As above       .  No flowsheet data found.      Assessment:       Problem List Items Addressed This Visit     None      Visit Diagnoses     Chest pain, unspecified type    -  Primary    Relevant Orders    Refer to Emergency Dept.        Plan:       Patient encouraged to go to emergency room due to this rather sudden onset severe chest pain that needs in detail evaluation    Orders Placed This Encounter   Procedures    Refer to Emergency Dept.        Follow up if symptoms worsen or fail to improve.    This note was prepared using voice recognition system and is likely to have sound alike errors that may have been overlooked even after proof reading.  Please call me with any questions    Discussed diagnosis, its evaluation, treatment and usual course. All questions answered.    Thank you for the courtesy of participating in the care of this patient    Sylvain Rainey MD

## 2020-06-30 ENCOUNTER — TELEPHONE (OUTPATIENT)
Dept: INTERNAL MEDICINE | Facility: CLINIC | Age: 50
End: 2020-06-30

## 2020-06-30 NOTE — TELEPHONE ENCOUNTER
Spoke with patient and scheduled her a visit with Dr. Beach to discuss pain on inhalation. She verbalized understanding.

## 2020-06-30 NOTE — TELEPHONE ENCOUNTER
----- Message from Padmini Villavicencio sent at 6/30/2020  9:27 AM CDT -----  Regarding: Continuous Pain  Contact: Pt  Pt called in regards to speaking with the staff in regards to still having the pain when she breathes in. Pt can be reached at [phone] (xdbd)

## 2020-07-02 ENCOUNTER — PATIENT OUTREACH (OUTPATIENT)
Dept: ADMINISTRATIVE | Facility: OTHER | Age: 50
End: 2020-07-02

## 2020-07-02 RX ORDER — CYCLOBENZAPRINE HCL 10 MG
10 TABLET ORAL 3 TIMES DAILY PRN
Qty: 90 TABLET | Refills: 0 | Status: SHIPPED | OUTPATIENT
Start: 2020-07-02 | End: 2020-07-07 | Stop reason: SDUPTHER

## 2020-07-02 NOTE — PROGRESS NOTES
Requested updates within Care Everywhere.  Patient's chart was reviewed for overdue RAJINDER topics.  Immunizations not reconciled.    Orders previously placed:mammo

## 2020-07-07 ENCOUNTER — LAB VISIT (OUTPATIENT)
Dept: LAB | Facility: HOSPITAL | Age: 50
End: 2020-07-07
Attending: FAMILY MEDICINE
Payer: MEDICAID

## 2020-07-07 ENCOUNTER — OFFICE VISIT (OUTPATIENT)
Dept: INTERNAL MEDICINE | Facility: CLINIC | Age: 50
End: 2020-07-07
Payer: MEDICAID

## 2020-07-07 VITALS
BODY MASS INDEX: 46.07 KG/M2 | DIASTOLIC BLOOD PRESSURE: 78 MMHG | HEART RATE: 100 BPM | TEMPERATURE: 69 F | SYSTOLIC BLOOD PRESSURE: 126 MMHG | OXYGEN SATURATION: 96 % | WEIGHT: 204.81 LBS | HEIGHT: 56 IN

## 2020-07-07 DIAGNOSIS — Z79.4 TYPE 2 DIABETES MELLITUS WITH HYPERGLYCEMIA, WITH LONG-TERM CURRENT USE OF INSULIN: Chronic | ICD-10-CM

## 2020-07-07 DIAGNOSIS — I15.2 HYPERTENSION COMPLICATING DIABETES: Chronic | ICD-10-CM

## 2020-07-07 DIAGNOSIS — M54.6 ACUTE RIGHT-SIDED THORACIC BACK PAIN: Primary | ICD-10-CM

## 2020-07-07 DIAGNOSIS — E11.59 HYPERTENSION COMPLICATING DIABETES: Chronic | ICD-10-CM

## 2020-07-07 DIAGNOSIS — E78.5 DYSLIPIDEMIA ASSOCIATED WITH TYPE 2 DIABETES MELLITUS: Chronic | ICD-10-CM

## 2020-07-07 DIAGNOSIS — E11.65 TYPE 2 DIABETES MELLITUS WITH HYPERGLYCEMIA, WITH LONG-TERM CURRENT USE OF INSULIN: Chronic | ICD-10-CM

## 2020-07-07 DIAGNOSIS — E11.69 DYSLIPIDEMIA ASSOCIATED WITH TYPE 2 DIABETES MELLITUS: Chronic | ICD-10-CM

## 2020-07-07 DIAGNOSIS — E11.42 DIABETIC POLYNEUROPATHY ASSOCIATED WITH TYPE 2 DIABETES MELLITUS: Chronic | ICD-10-CM

## 2020-07-07 DIAGNOSIS — Z12.39 SCREENING FOR BREAST CANCER: ICD-10-CM

## 2020-07-07 PROCEDURE — 80061 LIPID PANEL: CPT

## 2020-07-07 PROCEDURE — 99999 PR PBB SHADOW E&M-EST. PATIENT-LVL V: ICD-10-PCS | Mod: PBBFAC,,, | Performed by: FAMILY MEDICINE

## 2020-07-07 PROCEDURE — 99214 PR OFFICE/OUTPT VISIT, EST, LEVL IV, 30-39 MIN: ICD-10-PCS | Mod: S$PBB,,, | Performed by: FAMILY MEDICINE

## 2020-07-07 PROCEDURE — 99214 OFFICE O/P EST MOD 30 MIN: CPT | Mod: S$PBB,,, | Performed by: FAMILY MEDICINE

## 2020-07-07 PROCEDURE — 99999 PR PBB SHADOW E&M-EST. PATIENT-LVL V: CPT | Mod: PBBFAC,,, | Performed by: FAMILY MEDICINE

## 2020-07-07 PROCEDURE — 99215 OFFICE O/P EST HI 40 MIN: CPT | Mod: PBBFAC | Performed by: FAMILY MEDICINE

## 2020-07-07 PROCEDURE — 83036 HEMOGLOBIN GLYCOSYLATED A1C: CPT

## 2020-07-07 PROCEDURE — 36415 COLL VENOUS BLD VENIPUNCTURE: CPT

## 2020-07-07 RX ORDER — CYCLOBENZAPRINE HCL 10 MG
10 TABLET ORAL DAILY PRN
Qty: 30 TABLET | Refills: 2 | Status: SHIPPED | OUTPATIENT
Start: 2020-07-07 | End: 2020-10-06 | Stop reason: SDUPTHER

## 2020-07-07 NOTE — PROGRESS NOTES
CHIEF COMPLAINT  Shortness of Breath (Hurts when she inhales.)      DIAGNOSES, HISTORY, ASSESSMENT, AND PLAN  Assessment   1. Acute right-sided thoracic back pain    2. Type 2 diabetes mellitus with hyperglycemia, with long-term current use of insulin    3. Diabetic polyneuropathy associated with type 2 diabetes mellitus    4. Hypertension complicating diabetes    5. Dyslipidemia associated with type 2 diabetes mellitus    6. Screening for breast cancer         Plan   Problem List Items Addressed This Visit     Diabetic polyneuropathy associated with type 2 diabetes mellitus (Chronic)    Current Assessment & Plan     DIABETIC FOOT EXAM: Inspection of feet reveals no open wounds, ulcerations, significant calluses, or evidence of arterial insufficiency. 10g monofilament sensation is ABSENT in all 5 locations tested.  Future Appointments   Date Time Provider Department Center   7/14/2020  8:00 AM Alfred Sweeney DPM [facility] [place]             Type 2 diabetes mellitus with hyperglycemia, with long-term current use of insulin (Chronic)    Current Assessment & Plan     Diabetes Management Status    Statin: Taking  ACE/ARB: Taking    Screening or Prevention Patient's value Goal Complete/Controlled?   HgA1C Testing and Control   Lab Results   Component Value Date    HGBA1C 7.1 (H) 02/18/2020      Annually/Less than 8% Yes   Lipid profile : 02/18/2020 Annually Yes   LDL control Lab Results   Component Value Date    LDLCALC 49.6 (L) 02/18/2020    Annually/Less than 100 mg/dl  Yes   Nephropathy screening Lab Results   Component Value Date    LABMICR 7.0 04/25/2019     Lab Results   Component Value Date    PROTEINUA Negative 06/26/2020    Annually Yes   Blood pressure BP Readings from Last 1 Encounters:   07/07/20 126/78    Less than 140/90 Yes   Dilated retinal exam : 05/22/2019 Annually No   Foot exam   : 07/07/2020 Annually Yes     Lab Results   Component Value Date    HGBA1C 7.1 (H) 02/18/2020    HGBA1C 9.9 (H)  11/18/2019    HGBA1C 8.4 (H) 10/14/2019    ESTGFRAFRICA >60 06/26/2020    EGFRNONAA >60 06/26/2020    MICALBCREAT 10.4 04/25/2019    LDLCALC 49.6 (L) 02/18/2020     HEALTH MAINTENANCE: Diabetic health maintenance interventions reviewed and are up to date except for:      Topic    Eye Exam      Controlled, stable.         Relevant Orders    Ambulatory referral/consult to Optometry    Lipid Panel    Hemoglobin A1C    Microalbumin/creatinine urine ratio    Diabetes Digital Medicine (DDMP) Enrollment Order (Completed)    Hypertension complicating diabetes (Chronic)    Current Assessment & Plan     BP Readings from Last 6 Encounters:   07/07/20 126/78   06/26/20 122/84   06/26/20 126/82   06/22/20 (!) 163/86   05/19/20 122/78   04/24/20 (!) 129/90   Well controlled, stable.         Dyslipidemia associated with type 2 diabetes mellitus (Chronic)    Current Assessment & Plan     Lab Results   Component Value Date    CHOL 126 02/18/2020    CHOL 142 04/25/2019    TRIG 152 (H) 02/18/2020    TRIG 328 (H) 04/25/2019    HDL 46 02/18/2020    HDL 36 (L) 04/25/2019    LDLCALC 49.6 (L) 02/18/2020    LDLCALC 40.4 (L) 04/25/2019    NONHDLCHOL 80 02/18/2020    NONHDLCHOL 106 04/25/2019    AST 59 (H) 06/26/2020    ALT 56 (H) 06/26/2020   She appears to be tolerating statin for dyslipidemia without apparent symptoms of myositis or hepatic dysfunction.          Relevant Orders    Lipid Panel    Acute right-sided thoracic back pain - Primary    Current Assessment & Plan     Started early June. No relevant injury. Worse with deep inspiration and thoracic ROM. Alleviated with cyclobenzaprine without sedation or other side effects. No fever.  Chest X-ray  X-Ray Chest PA And Lateral    Narrative EXAMINATION:  XR CHEST PA AND LATERAL    CLINICAL HISTORY:  Mild persistent asthma, uncomplicated    TECHNIQUE:  PA and lateral views of the chest were performed.    COMPARISON:  04/19/2020    FINDINGS:  Low lung volumes accentuate the heart size and  "pulmonary vasculature.  There may be mild cardiomegaly.    No definite parenchymal consolidation or pleural effusion visualized.  No acute osseous findings demonstrated.      Impression As above      Electronically signed by: Binh Holland MD  Date:    06/26/2020  Time:    14:29              Relevant Medications    cyclobenzaprine (FLEXERIL) 10 MG tablet      Other Visit Diagnoses     Screening for breast cancer        Relevant Orders    Mammo Digital Screening Bilat          Orders Placed This Encounter    Mammo Digital Screening Bilat    Lipid Panel    Hemoglobin A1C    Microalbumin/creatinine urine ratio    Ambulatory referral/consult to Optometry    Diabetes Digital Medicine (DDMP) Enrollment Order    cyclobenzaprine (FLEXERIL) 10 MG tablet       Outpatient Medications Prior to Visit   Medication Sig Dispense Refill    albuterol (PROVENTIL) 2.5 mg /3 mL (0.083 %) nebulizer solution Take 3 mLs (2.5 mg total) by nebulization every 4 (four) hours. 180 mL 11    ALPRAZolam (XANAX) 1 MG tablet Take 1 tablet by mouth twice daily 60 tablet 0    amLODIPine (NORVASC) 10 MG tablet Take 1 tablet (10 mg total) by mouth once daily. 30 tablet 3    ARIPiprazole (ABILIFY) 10 MG Tab Take 1 tablet by mouth at bedtime 30 tablet 2    ARIPiprazole (ABILIFY) 10 MG Tab Take 1 tablet (10 mg total) by mouth once daily. 30 tablet 2    aspirin (ECOTRIN) 81 MG EC tablet Take 81 mg by mouth once daily.      atorvastatin (LIPITOR) 20 MG tablet Take 1 tablet (20 mg total) by mouth every evening. 30 tablet 0    BD INSULIN SYRINGE ULTRA-FINE 1/2 mL 30 gauge x 1/2" Syrg   0    benztropine (COGENTIN) 1 MG tablet Take 1 tablet (1 mg total) by mouth every evening. 30 tablet 1    blood sugar diagnostic Strp Check blood glucose 4 times daily as directed and as needed (dispense insurance preferred brand or patient choice) 200 each 5    blood-glucose meter Misc Use to check blood sugars, give meter based on insurance specification 1 " each 1    cyclosporine 0.05 % Drop Place 1 drop into both eyes 2 (two) times daily. 5.5 mL 11    DULoxetine (CYMBALTA) 60 MG capsule Take 1 capsule (60 mg total) by mouth 2 (two) times daily. 60 capsule 1    DULoxetine (CYMBALTA) 60 MG capsule Take 1 capsule by mouth twice daily 60 capsule 0    ergocalciferol (ERGOCALCIFEROL) 50,000 unit Cap Take 1 capsule (50,000 Units total) by mouth every 14 (fourteen) days. 4 capsule 5    fish oil-omega-3 fatty acids 300-1,000 mg capsule Take 1 capsule by mouth once daily.      furosemide (LASIX) 20 MG tablet Take 1 tablet (20 mg total) by mouth once daily. 30 tablet 11    gabapentin (NEURONTIN) 300 MG capsule Take 2 capsules (600 mg total) by mouth 3 (three) times daily. 90 capsule 3    hydrALAZINE (APRESOLINE) 10 MG tablet Take 1 tablet (10 mg total) by mouth every 12 (twelve) hours. 60 tablet 11    insulin aspart U-100 (NOVOLOG FLEXPEN U-100 INSULIN) 100 unit/mL (3 mL) InPn pen Inject before meals three times a day up to 30 units daily 15 mL 3    lancets 28 gauge Misc use as directed 3 times daily 100 each 11    linaCLOtide (LINZESS) 145 mcg Cap capsule Take 1 capsule (145 mcg total) by mouth daily as needed (for constipation). 30 capsule 11    liraglutide 0.6 mg/0.1 mL, 18 mg/3 mL, subq PNIJ (VICTOZA 2-MILENA) 0.6 mg/0.1 mL (18 mg/3 mL) PnIj Use 1.8mg under the skin daily 9 mL 3    lurasidone (LATUDA) 120 mg Tab Take 1 tablet by mouth everyday at 5 pm with food 30 tablet 0    magnesium oxide (MAG-OX) 400 mg (241.3 mg magnesium) tablet Take 1 tablet (400 mg total) by mouth once daily. 30 tablet 3    metFORMIN (GLUCOPHAGE) 1000 MG tablet TAKE 1 TABLET (1,000 MG TOTAL) BY MOUTH 2 (TWO) TIMES DAILY. 180 tablet 0    metoprolol succinate (TOPROL-XL) 100 MG 24 hr tablet Take 2 tablets (200 mg total) by mouth every evening. 180 tablet 3    mirtazapine (REMERON) 45 MG tablet Take 1 tablet (45 mg total) by mouth every evening. 30 tablet 1    mirtazapine (REMERON) 45 MG  "tablet Take 1 tablet by mouth every evevning 30 tablet 0    mometasone (ASMANEX TWISTHALER) 220 mcg/ actuation (120) AePB Inhale 2 puffs into the lungs 2 (two) times daily. Controller 1 each 11    montelukast (SINGULAIR) 10 mg tablet Take 1 tablet (10 mg total) by mouth every evening. 30 tablet 11    MULTIVIT,CALC,MINS/IRON/FOLIC (DAILY MULTIPLE FOR WOMEN ORAL) Take 1 tablet by mouth once daily.      pen needle, diabetic (BD ULTRA-FINE MINI PEN NEEDLE) 31 gauge x 3/16" Ndle Use 5 a day 200 each 3    spironolactone (ALDACTONE) 25 MG tablet Take 1 tablet (25 mg total) by mouth once daily. 30 tablet 6    temazepam (RESTORIL) 15 mg Cap Take 1 capsule by mouth at bedtime 30 capsule 1    topiramate (TOPAMAX) 50 MG tablet Take 1 tablet (50 mg total) by mouth 2 (two) times daily. 60 tablet 11    TOUJEO MAX U-300 SOLOSTAR 300 unit/mL (3 mL) InPn Inject 90 units once daily at bedtime 15 mL 3    TOUJEO SOLOSTAR U-300 INSULIN 300 unit/mL (1.5 mL) InPn pen Inject 80 Units into the skin every evening. Needs 9 pens for 1 month so pharmacy please give adequate amount  and call office for questions 12 mL 3    valACYclovir (VALTREX) 1000 MG tablet Take 1 tablet (1,000 mg total) by mouth once daily. 30 tablet 11    valsartan (DIOVAN) 320 MG tablet Take 1 tablet (320 mg total) by mouth once daily. 90 tablet 3    cyclobenzaprine (FLEXERIL) 10 MG tablet Take 1 tablet (10 mg total) by mouth 3 (three) times daily as needed for Muscle spasms. 90 tablet 0    ondansetron (ZOFRAN-ODT) 4 MG TbDL Take 1 tablet (4 mg total) by mouth every 6 (six) hours as needed. (Patient not taking: Reported on 7/7/2020) 6 tablet 0     No facility-administered medications prior to visit.         Medications Discontinued During This Encounter   Medication Reason    ondansetron (ZOFRAN-ODT) 4 MG TbDL Patient no longer taking    cyclobenzaprine (FLEXERIL) 10 MG tablet Reorder        Medications Ordered This Encounter   Medications    cyclobenzaprine " "(FLEXERIL) 10 MG tablet     Sig: Take 1 tablet (10 mg total) by mouth daily as needed (for back muscle spasms).     Dispense:  30 tablet     Refill:  2       Follow up in about 6 months (around 1/7/2021), or if symptoms worsen or fail to improve, for re-evaluate problem(s) discussed today.     Subjective   Review of Systems   Constitutional: Negative for chills and fever.   Respiratory: Negative for cough, chest tightness and shortness of breath.    Endocrine: Negative for polydipsia and polyuria.        Objective   Vitals:    07/07/20 1325   BP: 126/78   BP Location: Left arm   Patient Position: Sitting   BP Method: Medium (Manual)   Pulse: 100   Temp: (!) 69.4 °F (20.8 °C)   TempSrc: Tympanic   SpO2: 96%   Weight: 92.9 kg (204 lb 12.9 oz)   Height: 4' 8" (1.422 m)     Physical Exam  Vitals signs reviewed.   Constitutional:       General: She is not in acute distress.     Appearance: Normal appearance. She is not ill-appearing, toxic-appearing or diaphoretic.   HENT:      Head: Normocephalic and atraumatic.      Ears:      Comments: Hearing grossly intact.  Eyes:      General: No scleral icterus.     Conjunctiva/sclera: Conjunctivae normal.   Cardiovascular:      Rate and Rhythm: Normal rate and regular rhythm.   Pulmonary:      Effort: Pulmonary effort is normal. No respiratory distress.      Breath sounds: Normal breath sounds. No stridor. No wheezing, rhonchi or rales.   Chest:      Chest wall: No tenderness.   Abdominal:      Palpations: Abdomen is soft. There is no mass.      Tenderness: There is no abdominal tenderness.   Musculoskeletal: Normal range of motion.      Comments: Back nontender.   Skin:     General: Skin is warm and dry.   Neurological:      General: No focal deficit present.      Mental Status: She is alert and oriented to person, place, and time.   Psychiatric:         Mood and Affect: Mood normal.         Behavior: Behavior normal.         Judgment: Judgment normal.           Documentation " "entered by me for this encounter may have been done in part using speech-recognition technology. Although I have made an effort to ensure accuracy, "sound like" errors may exist and should be interpreted in context. -DOMINIK Beach MD.  "

## 2020-07-07 NOTE — ASSESSMENT & PLAN NOTE
Diabetes Management Status    Statin: Taking  ACE/ARB: Taking    Screening or Prevention Patient's value Goal Complete/Controlled?   HgA1C Testing and Control   Lab Results   Component Value Date    HGBA1C 7.1 (H) 02/18/2020      Annually/Less than 8% Yes   Lipid profile : 02/18/2020 Annually Yes   LDL control Lab Results   Component Value Date    LDLCALC 49.6 (L) 02/18/2020    Annually/Less than 100 mg/dl  Yes   Nephropathy screening Lab Results   Component Value Date    LABMICR 7.0 04/25/2019     Lab Results   Component Value Date    PROTEINUA Negative 06/26/2020    Annually Yes   Blood pressure BP Readings from Last 1 Encounters:   07/07/20 126/78    Less than 140/90 Yes   Dilated retinal exam : 05/22/2019 Annually No   Foot exam   : 07/07/2020 Annually Yes     Lab Results   Component Value Date    HGBA1C 7.1 (H) 02/18/2020    HGBA1C 9.9 (H) 11/18/2019    HGBA1C 8.4 (H) 10/14/2019    ESTGFRAFRICA >60 06/26/2020    EGFRNONAA >60 06/26/2020    MICALBCREAT 10.4 04/25/2019    LDLCALC 49.6 (L) 02/18/2020     HEALTH MAINTENANCE: Diabetic health maintenance interventions reviewed and are up to date except for:      Topic    Eye Exam      Controlled, stable.

## 2020-07-07 NOTE — ASSESSMENT & PLAN NOTE
DIABETIC FOOT EXAM: Inspection of feet reveals no open wounds, ulcerations, significant calluses, or evidence of arterial insufficiency. 10g monofilament sensation is ABSENT in all 5 locations tested.  Future Appointments   Date Time Provider Department Center   7/14/2020  8:00 AM YEISON Ventura POD  [place]

## 2020-07-07 NOTE — ASSESSMENT & PLAN NOTE
Lab Results   Component Value Date    CHOL 126 02/18/2020    CHOL 142 04/25/2019    TRIG 152 (H) 02/18/2020    TRIG 328 (H) 04/25/2019    HDL 46 02/18/2020    HDL 36 (L) 04/25/2019    LDLCALC 49.6 (L) 02/18/2020    LDLCALC 40.4 (L) 04/25/2019    NONHDLCHOL 80 02/18/2020    NONHDLCHOL 106 04/25/2019    AST 59 (H) 06/26/2020    ALT 56 (H) 06/26/2020   She appears to be tolerating statin for dyslipidemia without apparent symptoms of myositis or hepatic dysfunction.

## 2020-07-07 NOTE — ASSESSMENT & PLAN NOTE
Started early June. No relevant injury. Worse with deep inspiration and thoracic ROM. Alleviated with cyclobenzaprine without sedation or other side effects. No fever.  Chest X-ray  X-Ray Chest PA And Lateral    Narrative EXAMINATION:  XR CHEST PA AND LATERAL    CLINICAL HISTORY:  Mild persistent asthma, uncomplicated    TECHNIQUE:  PA and lateral views of the chest were performed.    COMPARISON:  04/19/2020    FINDINGS:  Low lung volumes accentuate the heart size and pulmonary vasculature.  There may be mild cardiomegaly.    No definite parenchymal consolidation or pleural effusion visualized.  No acute osseous findings demonstrated.      Impression As above      Electronically signed by: Binh Holland MD  Date:    06/26/2020  Time:    14:29

## 2020-07-07 NOTE — ASSESSMENT & PLAN NOTE
BP Readings from Last 6 Encounters:   07/07/20 126/78   06/26/20 122/84   06/26/20 126/82   06/22/20 (!) 163/86   05/19/20 122/78   04/24/20 (!) 129/90   Well controlled, stable.

## 2020-07-08 LAB
CHOLEST SERPL-MCNC: 115 MG/DL (ref 120–199)
CHOLEST/HDLC SERPL: 3.7 {RATIO} (ref 2–5)
ESTIMATED AVG GLUCOSE: 286 MG/DL (ref 68–131)
HBA1C MFR BLD HPLC: 11.6 % (ref 4–5.6)
HDLC SERPL-MCNC: 31 MG/DL (ref 40–75)
HDLC SERPL: 27 % (ref 20–50)
LDLC SERPL CALC-MCNC: ABNORMAL MG/DL (ref 63–159)
NONHDLC SERPL-MCNC: 84 MG/DL
TRIGL SERPL-MCNC: 547 MG/DL (ref 30–150)

## 2020-07-09 ENCOUNTER — HOSPITAL ENCOUNTER (OUTPATIENT)
Dept: RADIOLOGY | Facility: HOSPITAL | Age: 50
Discharge: HOME | End: 2020-07-09
Attending: PHYSICIAN ASSISTANT
Payer: MEDICAID

## 2020-07-09 ENCOUNTER — OFFICE VISIT (OUTPATIENT)
Dept: GASTROENTEROLOGY | Facility: CLINIC | Age: 50
End: 2020-07-09
Payer: MEDICAID

## 2020-07-09 VITALS
WEIGHT: 207.88 LBS | BODY MASS INDEX: 46.76 KG/M2 | SYSTOLIC BLOOD PRESSURE: 120 MMHG | HEART RATE: 104 BPM | DIASTOLIC BLOOD PRESSURE: 72 MMHG | HEIGHT: 56 IN

## 2020-07-09 DIAGNOSIS — E11.9 TYPE 2 DIABETES MELLITUS: ICD-10-CM

## 2020-07-09 DIAGNOSIS — R19.7 DIARRHEA, UNSPECIFIED TYPE: ICD-10-CM

## 2020-07-09 DIAGNOSIS — K58.2 IRRITABLE BOWEL SYNDROME WITH BOTH CONSTIPATION AND DIARRHEA: ICD-10-CM

## 2020-07-09 DIAGNOSIS — K58.2 IRRITABLE BOWEL SYNDROME WITH BOTH CONSTIPATION AND DIARRHEA: Chronic | ICD-10-CM

## 2020-07-09 DIAGNOSIS — R19.7 DIARRHEA, UNSPECIFIED TYPE: Primary | ICD-10-CM

## 2020-07-09 PROCEDURE — 99203 PR OFFICE/OUTPT VISIT, NEW, LEVL III, 30-44 MIN: ICD-10-PCS | Mod: S$PBB,,, | Performed by: PHYSICIAN ASSISTANT

## 2020-07-09 PROCEDURE — 74019 RADEX ABDOMEN 2 VIEWS: CPT | Mod: 26,,, | Performed by: RADIOLOGY

## 2020-07-09 PROCEDURE — 99203 OFFICE O/P NEW LOW 30 MIN: CPT | Mod: S$PBB,,, | Performed by: PHYSICIAN ASSISTANT

## 2020-07-09 PROCEDURE — 74019 RADEX ABDOMEN 2 VIEWS: CPT | Mod: TC

## 2020-07-09 PROCEDURE — 99999 PR PBB SHADOW E&M-EST. PATIENT-LVL V: CPT | Mod: PBBFAC,,, | Performed by: PHYSICIAN ASSISTANT

## 2020-07-09 PROCEDURE — 99215 OFFICE O/P EST HI 40 MIN: CPT | Mod: PBBFAC,25 | Performed by: PHYSICIAN ASSISTANT

## 2020-07-09 PROCEDURE — 99999 PR PBB SHADOW E&M-EST. PATIENT-LVL V: ICD-10-PCS | Mod: PBBFAC,,, | Performed by: PHYSICIAN ASSISTANT

## 2020-07-09 PROCEDURE — 74019 XR ABDOMEN FLAT AND ERECT: ICD-10-PCS | Mod: 26,,, | Performed by: RADIOLOGY

## 2020-07-09 NOTE — LETTER
July 9, 2020      DOMINIK Beach MD  [address]  [place] [address]           [facility]  [address] [place] [id number]  Phone: [phone]  Fax: [phone]          Patient: Yolanda Betts   MR Number: [id number]   YOB: 1970   Date of Visit: 7/9/2020       Dear Dr. DOMINIK Beach:    Thank you for referring Yolanda Betts to me for evaluation. Attached you will find relevant portions of my assessment and plan of care.    If you have questions, please do not hesitate to call me. I look forward to following Yolanda Betts along with you.    Sincerely,    Mojgan Franklin PA-C    Enclosure  CC:  No Recipients    If you would like to receive this communication electronically, please contact [email or web address] or [phone] to request more information on [employer] access.    For providers and/or their staff who would like to refer a patient to [facility], please contact us through our one-stop-shop provider referral line, [facility] , at [phone].    If you feel you have received this communication in error or would no longer like to receive these types of communications, please e-mail [email or web address]

## 2020-07-09 NOTE — PROGRESS NOTES
Clinic Consult:  [facility] Consultation Note    Reason for Consult:  The primary encounter diagnosis was Diarrhea, unspecified type. A diagnosis of Irritable bowel syndrome with both constipation and diarrhea was also pertinent to this visit.    PCP: CHARLA Beach   [address] [facility] / [place] [address]    CC: Abdominal Pain and Diarrhea      HPI:  This is a 50 y.o. female here for evaluation of the above. Referred by PCP. Patient states she has IBS with constipation and diarrhea for years. She typically take Linzess for her constipation, although over the past 6-8 months, patient has been experiencing mostly diarrhea. Symptoms have progressed since January when she had cholecystectomy. She will have flares where she has 4-6 episodes of diarrhea for a 3 or 4 days, then no bowel movement for 2-3 days followed by diarrhea again. Associated symptoms include abdominal discomfort, bloating and distention. She denies bloody stools, but noted occasional bright red blood with wiping after days of diarrhea. She believes this is due to her bottom being raw from so many stools.      Review of Systems   Constitutional: Negative for appetite change, chills, diaphoresis, fatigue, fever and unexpected weight change.   HENT: Negative for trouble swallowing.    Respiratory: Positive for shortness of breath (Occasional). Negative for cough.    Gastrointestinal: Positive for abdominal distention, abdominal pain, anal bleeding, constipation and diarrhea. Negative for blood in stool, nausea and vomiting.   Genitourinary: Negative for dysuria.   Neurological: Negative for dizziness, weakness and light-headedness.   Psychiatric/Behavioral: Negative for dysphoric mood. The patient is not nervous/anxious.         Medical History:   Past Medical History:   Diagnosis Date    Abnormal Pap smear of cervix     HPV genital warts    Anemia     Anxiety     Arthritis     Asthma 10/11/2016    Bipolar 1 disorder      Diabetes mellitus, type 2     Dyslipidemia associated with type 2 diabetes mellitus 2019    Genital warts     GERD (gastroesophageal reflux disease)     Herpes simplex virus (HSV) infection     Hypertension     Hypertension complicating diabetes 2019    Migraine with aura and without status migrainosus, not intractable 3/21/2016    Mild persistent asthma without complication 10/11/2016    Morbid obesity with body mass index (BMI) of 45.0 to 49.9 in adult 8/3/2017    Obstructive sleep apnea     ALEN (obstructive sleep apnea)     2L per N/C q HS    Schizoaffective disorder, bipolar type 2019    Seasonal allergic rhinitis due to pollen 2019    Type 2 diabetes mellitus with hyperglycemia, with long-term current use of insulin 2017    Type 2 diabetes mellitus with hyperglycemia, without long-term current use of insulin 2017       Surgical History:   Past Surgical History:   Procedure Laterality Date    abcess removed right back      BELT ABDOMINOPLASTY      BREAST SURGERY Bilateral     Reduction     SECTION      X 2    COLONOSCOPY      COLONOSCOPY N/A 2018    Procedure: colonoscopy for iron deficiency anemia;  Surgeon: Frankie Sanchez MD;  Location: [facility];  Service: Endoscopy;  Laterality: N/A;    COLONOSCOPY N/A 2018    Procedure: COLONOSCOPY;  Surgeon: Frankie Sanchez MD;  Location: [facility];  Service: Endoscopy;  Laterality: N/A;    HYSTERECTOMY      w/ BSO; hypermenorrhea    MOUTH SURGERY      OOPHORECTOMY      hyst/bso, hypermenorrhea    ROBOT-ASSISTED CHOLECYSTECTOMY USING DA DARI XI N/A 1/3/2020    Procedure: XI ROBOTIC CHOLECYSTECTOMY;  Surgeon: Damir Driscoll MD;  [facility];  Service: General;  Laterality: N/A;    TUBAL LIGATION         Family History:    Family History   Problem Relation Age of Onset    Diabetes Mother     Hyperlipidemia Mother     Hypertension Mother     Asthma Mother     COPD  "Mother     Glaucoma Mother     Thyroid disease Mother     Anesthesia problems Mother         "almost had a cardiac arrest" , blood clots    Hypertension Father     Hyperlipidemia Father     Cancer Father         Brain, lung, liver, kidney    Heart disease Maternal Grandmother     Hyperlipidemia Maternal Grandmother     Hypertension Maternal Grandmother     Cataracts Maternal Grandmother     Diabetes Maternal Grandmother     Heart disease Maternal Grandfather     Hyperlipidemia Maternal Grandfather     Hypertension Maternal Grandfather     Glaucoma Maternal Grandfather     Cancer Maternal Grandfather     Cataracts Maternal Grandfather     Macular degeneration Maternal Grandfather     Diabetes Maternal Grandfather     Heart disease Paternal Grandmother     Hyperlipidemia Paternal Grandmother     Hypertension Paternal Grandmother     Cataracts Paternal Grandmother     Heart disease Paternal Grandfather     Hyperlipidemia Paternal Grandfather     Hypertension Paternal Grandfather     Cataracts Paternal Grandfather     Breast cancer Maternal Cousin     Breast cancer Maternal Cousin     Breast cancer Maternal Cousin     Breast cancer Maternal Cousin        Social History:   Social History     Socioeconomic History    Marital status: Single     Spouse name: Not on file    Number of children: Not on file    Years of education: Not on file    Highest education level: Not on file   Occupational History    Not on file   Social Needs    Financial resource strain: Not on file    Food insecurity     Worry: Not on file     Inability: Not on file    Transportation needs     Medical: Not on file     Non-medical: Not on file   Tobacco Use    Smoking status: Never Smoker    Smokeless tobacco: Never Used   Substance and Sexual Activity    Alcohol use: Not Currently     Alcohol/week: 0.0 standard drinks     Comment: socially  No alcohol 72h prior to sx    Drug use: No    Sexual activity: Not " "Currently     Partners: Male   Lifestyle    Physical activity     Days per week: Not on file     Minutes per session: Not on file    Stress: Not on file   Relationships    Social connections     Talks on phone: Not on file     Gets together: Not on file     Attends Latter day service: Not on file     Active member of club or organization: Not on file     Attends meetings of clubs or organizations: Not on file     Relationship status: Not on file   Other Topics Concern    Not on file   Social History Narrative    Long-term care nurse       Allergies:   Review of patient's allergies indicates:   Allergen Reactions    Codeine Itching    Ibuprofen     Neuromuscular blockers, steroidal Hives     some    Penicillins     Sulfamethoxazole-trimethoprim     Latex, natural rubber Rash    Morphine Rash     itching    Norco [hydrocodone-acetaminophen] Itching, Rash and Hallucinations    Seconal [secobarbital sodium] Rash     itching    Tylox [oxycodone-acetaminophen] Rash       Home Medications:   Current Outpatient Medications on File Prior to Visit   Medication Sig Dispense Refill    albuterol (PROVENTIL) 2.5 mg /3 mL (0.083 %) nebulizer solution Take 3 mLs (2.5 mg total) by nebulization every 4 (four) hours. 180 mL 11    ALPRAZolam (XANAX) 1 MG tablet Take 1 tablet by mouth twice daily 60 tablet 0    amLODIPine (NORVASC) 10 MG tablet Take 1 tablet (10 mg total) by mouth once daily. 30 tablet 3    ARIPiprazole (ABILIFY) 10 MG Tab Take 1 tablet by mouth at bedtime 30 tablet 2    aspirin (ECOTRIN) 81 MG EC tablet Take 81 mg by mouth once daily.      atorvastatin (LIPITOR) 20 MG tablet Take 1 tablet (20 mg total) by mouth every evening. 30 tablet 0    BD INSULIN SYRINGE ULTRA-FINE 1/2 mL 30 gauge x 1/2" Syrg   0    benztropine (COGENTIN) 1 MG tablet Take 1 tablet (1 mg total) by mouth every evening. 30 tablet 1    blood sugar diagnostic Strp Check blood glucose 4 times daily as directed and as needed " (dispense insurance preferred brand or patient choice) 200 each 5    blood-glucose meter Misc Use to check blood sugars, give meter based on insurance specification 1 each 1    cyclobenzaprine (FLEXERIL) 10 MG tablet Take 1 tablet (10 mg total) by mouth daily as needed (for back muscle spasms). 30 tablet 2    cyclosporine 0.05 % Drop Place 1 drop into both eyes 2 (two) times daily. 5.5 mL 11    DULoxetine (CYMBALTA) 60 MG capsule Take 1 capsule (60 mg total) by mouth 2 (two) times daily. 60 capsule 1    ergocalciferol (ERGOCALCIFEROL) 50,000 unit Cap Take 1 capsule (50,000 Units total) by mouth every 14 (fourteen) days. 4 capsule 5    fish oil-omega-3 fatty acids 300-1,000 mg capsule Take 1 capsule by mouth once daily.      furosemide (LASIX) 20 MG tablet Take 1 tablet (20 mg total) by mouth once daily. 30 tablet 11    gabapentin (NEURONTIN) 300 MG capsule Take 2 capsules (600 mg total) by mouth 3 (three) times daily. 90 capsule 3    hydrALAZINE (APRESOLINE) 10 MG tablet Take 1 tablet (10 mg total) by mouth every 12 (twelve) hours. 60 tablet 11    insulin aspart U-100 (NOVOLOG FLEXPEN U-100 INSULIN) 100 unit/mL (3 mL) InPn pen Inject before meals three times a day up to 30 units daily 15 mL 3    lancets 28 gauge Misc use as directed 3 times daily 100 each 11    liraglutide 0.6 mg/0.1 mL, 18 mg/3 mL, subq PNIJ (VICTOZA 2-MILENA) 0.6 mg/0.1 mL (18 mg/3 mL) PnIj Use 1.8mg under the skin daily 9 mL 3    lurasidone (LATUDA) 120 mg Tab Take 1 tablet by mouth everyday at 5 pm with food 30 tablet 0    magnesium oxide (MAG-OX) 400 mg (241.3 mg magnesium) tablet Take 1 tablet (400 mg total) by mouth once daily. 30 tablet 3    metFORMIN (GLUCOPHAGE) 1000 MG tablet TAKE 1 TABLET (1,000 MG TOTAL) BY MOUTH 2 (TWO) TIMES DAILY. 180 tablet 0    metoprolol succinate (TOPROL-XL) 100 MG 24 hr tablet Take 2 tablets (200 mg total) by mouth every evening. 180 tablet 3    mirtazapine (REMERON) 45 MG tablet Take 1 tablet by  "mouth every evevning 30 tablet 0    mometasone (ASMANEX TWISTHALER) 220 mcg/ actuation (120) AePB Inhale 2 puffs into the lungs 2 (two) times daily. Controller 1 each 11    montelukast (SINGULAIR) 10 mg tablet Take 1 tablet (10 mg total) by mouth every evening. 30 tablet 11    MULTIVIT,CALC,MINS/IRON/FOLIC (DAILY MULTIPLE FOR WOMEN ORAL) Take 1 tablet by mouth once daily.      pen needle, diabetic (BD ULTRA-FINE MINI PEN NEEDLE) 31 gauge x 3/16" Ndle Use 5 a day 200 each 3    spironolactone (ALDACTONE) 25 MG tablet Take 1 tablet (25 mg total) by mouth once daily. 30 tablet 6    temazepam (RESTORIL) 15 mg Cap Take 1 capsule by mouth at bedtime 30 capsule 1    topiramate (TOPAMAX) 50 MG tablet Take 1 tablet (50 mg total) by mouth 2 (two) times daily. 60 tablet 11    TOUJEO MAX U-300 SOLOSTAR 300 unit/mL (3 mL) InPn Inject 90 units once daily at bedtime 15 mL 3    valACYclovir (VALTREX) 1000 MG tablet Take 1 tablet (1,000 mg total) by mouth once daily. 30 tablet 11    valsartan (DIOVAN) 320 MG tablet Take 1 tablet (320 mg total) by mouth once daily. 90 tablet 3    ARIPiprazole (ABILIFY) 10 MG Tab Take 1 tablet (10 mg total) by mouth once daily. (Patient not taking: Reported on 7/9/2020) 30 tablet 2    DULoxetine (CYMBALTA) 60 MG capsule Take 1 capsule by mouth twice daily (Patient not taking: Reported on 7/9/2020) 60 capsule 0    linaCLOtide (LINZESS) 145 mcg Cap capsule Take 1 capsule (145 mcg total) by mouth daily as needed (for constipation). (Patient not taking: Reported on 7/9/2020) 30 capsule 11    [DISCONTINUED] mirtazapine (REMERON) 45 MG tablet Take 1 tablet (45 mg total) by mouth every evening. 30 tablet 1    [DISCONTINUED] TOUJEO SOLOSTAR U-300 INSULIN 300 unit/mL (1.5 mL) InPn pen Inject 80 Units into the skin every evening. Needs 9 pens for 1 month so pharmacy please give adequate amount  and call office for questions 12 mL 3     No current facility-administered medications on file prior to " "visit.        Physical Exam:  /72   Pulse 104   Ht 4' 8" (1.422 m)   Wt 94.3 kg (207 lb 14.3 oz)   BMI 46.61 kg/m²   Body mass index is 46.61 kg/m².  Physical Exam  Constitutional:       General: She is not in acute distress.     Appearance: Normal appearance. She is obese. She is not ill-appearing or diaphoretic.   HENT:      Head: Normocephalic and atraumatic.   Eyes:      Extraocular Movements: Extraocular movements intact.   Neck:      Musculoskeletal: Normal range of motion.   Cardiovascular:      Rate and Rhythm: Normal rate and regular rhythm.      Heart sounds: Normal heart sounds.   Pulmonary:      Effort: Pulmonary effort is normal. No respiratory distress.      Breath sounds: Normal breath sounds. No wheezing.   Abdominal:      General: Bowel sounds are normal. There is distension (Difficult to tell how distended, as patient has obese abdomen).      Palpations: There is no mass.      Tenderness: There is no abdominal tenderness. There is no guarding.   Musculoskeletal: Normal range of motion.   Skin:     General: Skin is warm and dry.      Coloration: Skin is pale.      Findings: No erythema.   Neurological:      General: No focal deficit present.      Mental Status: She is alert and oriented to person, place, and time.   Psychiatric:         Mood and Affect: Mood normal.         Behavior: Behavior normal.         Thought Content: Thought content normal.         Judgment: Judgment normal.         Labs: Pertinent labs reviewed.  Endoscopy:   CRC Screenin  Anticoagulation:     Assessment:  1. Diarrhea, unspecified type    2. Irritable bowel syndrome with both constipation and diarrhea         Recommendations:  -Will r/o overflow diarrhea with abdominal Xray due to patients history of chronic constipation.  -If normal Xray will move forward with stool studies and a trial of cholestyramine.    Diarrhea, unspecified type  -     X-Ray Abdomen Flat And Erect; Future; Expected date: " 07/09/2020    Irritable bowel syndrome with both constipation and diarrhea  -     Ambulatory referral/consult to Gastroenterology  -     X-Ray Abdomen Flat And Erect; Future; Expected date: 07/09/2020        No follow-ups on file.    Thank you so much for allowing me to participate in the care of Yolanda Franklin PA-C

## 2020-07-10 ENCOUNTER — HOSPITAL ENCOUNTER (OUTPATIENT)
Dept: RADIOLOGY | Facility: HOSPITAL | Age: 50
Discharge: HOME | End: 2020-07-10
Attending: FAMILY MEDICINE
Payer: MEDICAID

## 2020-07-10 ENCOUNTER — OFFICE VISIT (OUTPATIENT)
Dept: RHEUMATOLOGY | Facility: CLINIC | Age: 50
End: 2020-07-10
Payer: MEDICAID

## 2020-07-10 ENCOUNTER — TELEPHONE (OUTPATIENT)
Dept: ENDOCRINOLOGY | Facility: CLINIC | Age: 50
End: 2020-07-10

## 2020-07-10 ENCOUNTER — TELEPHONE (OUTPATIENT)
Dept: INTERNAL MEDICINE | Facility: CLINIC | Age: 50
End: 2020-07-10

## 2020-07-10 VITALS
SYSTOLIC BLOOD PRESSURE: 141 MMHG | DIASTOLIC BLOOD PRESSURE: 90 MMHG | HEART RATE: 90 BPM | HEIGHT: 56 IN | WEIGHT: 207.69 LBS | BODY MASS INDEX: 46.72 KG/M2

## 2020-07-10 VITALS — WEIGHT: 207.69 LBS | HEIGHT: 56 IN | BODY MASS INDEX: 46.72 KG/M2

## 2020-07-10 DIAGNOSIS — G89.29 CHRONIC RIGHT SHOULDER PAIN: Primary | ICD-10-CM

## 2020-07-10 DIAGNOSIS — M25.511 CHRONIC RIGHT SHOULDER PAIN: Primary | ICD-10-CM

## 2020-07-10 DIAGNOSIS — Z71.89 COUNSELING ON HEALTH PROMOTION AND DISEASE PREVENTION: ICD-10-CM

## 2020-07-10 DIAGNOSIS — G89.4 CHRONIC PAIN SYNDROME: ICD-10-CM

## 2020-07-10 DIAGNOSIS — Z12.39 SCREENING FOR BREAST CANCER: ICD-10-CM

## 2020-07-10 PROCEDURE — 99999 PR PBB SHADOW E&M-EST. PATIENT-LVL V: CPT | Mod: PBBFAC,,, | Performed by: INTERNAL MEDICINE

## 2020-07-10 PROCEDURE — 99999 PR PBB SHADOW E&M-EST. PATIENT-LVL V: ICD-10-PCS | Mod: PBBFAC,,, | Performed by: INTERNAL MEDICINE

## 2020-07-10 PROCEDURE — 99214 OFFICE O/P EST MOD 30 MIN: CPT | Mod: S$PBB,,, | Performed by: INTERNAL MEDICINE

## 2020-07-10 PROCEDURE — 77063 MAMMO DIGITAL SCREENING BILAT WITH TOMOSYNTHESIS_CAD: ICD-10-PCS | Mod: 26,,, | Performed by: RADIOLOGY

## 2020-07-10 PROCEDURE — 99214 PR OFFICE/OUTPT VISIT, EST, LEVL IV, 30-39 MIN: ICD-10-PCS | Mod: S$PBB,,, | Performed by: INTERNAL MEDICINE

## 2020-07-10 PROCEDURE — 77067 MAMMO DIGITAL SCREENING BILAT WITH TOMOSYNTHESIS_CAD: ICD-10-PCS | Mod: 26,,, | Performed by: RADIOLOGY

## 2020-07-10 PROCEDURE — 77067 SCR MAMMO BI INCL CAD: CPT | Mod: 26,,, | Performed by: RADIOLOGY

## 2020-07-10 PROCEDURE — 77063 BREAST TOMOSYNTHESIS BI: CPT | Mod: 26,,, | Performed by: RADIOLOGY

## 2020-07-10 PROCEDURE — 99215 OFFICE O/P EST HI 40 MIN: CPT | Mod: PBBFAC | Performed by: INTERNAL MEDICINE

## 2020-07-10 PROCEDURE — 77067 SCR MAMMO BI INCL CAD: CPT | Mod: TC

## 2020-07-10 NOTE — TELEPHONE ENCOUNTER
----- Message from Wes Mathis sent at 7/10/2020  9:21 AM CDT -----  Regarding: Advice  Contact: Patient [phone]  Patient would like to get medical advice.    Comments: took second dosage of insulin without knowing this morning, would like a call back to discuss.    Please call an advise  Thank you

## 2020-07-10 NOTE — TELEPHONE ENCOUNTER
Returned call to pt regarding her taking too much insulin. Pt stated she is currently being seeing by her rheumatologist. I notified pt to get her glucose checked ASAP, I asked her if she was feeling weak or fatigued. She stated she was feeling fine. I notified pt to continue to eat snacks if her BGL are too low. Message forwarded to  and

## 2020-07-10 NOTE — TELEPHONE ENCOUNTER
"Yolanda Taet.    I received your message.    Check your blood glucose this evening. If it is 150 or greater, take one-half your regular Toujeo dose. If it is less than 150 or less, just skip tonight's dose and resume your regular dose tomorrow.    I'm sorry to hear that your not getting the pain relief you want. Your options for pain management specialists are limited with Medicaid, unfortunately.    I just sent to you a pain clinic referral that includes your options outside of [facility]. I sent it to you as a [facility] "Letter." It contains instructions for scheduling your appointment with the pain management clinic of your choice. You should be able to log in to your [facility] account and view and print this document yourself online at [email or web address]. If you need help with [facility], help is available by phone at [phone].    I hope this helps.    Sincerely,    DOMINIK Beach MD  (Copy of referral letter shown below.)          07/10/2020   1970; MRN [id number]    Dear Yolanda,    Here is your referral to a pain management specialistspecialist I mentioned in my message.    Call the pain management specialist or pain management clinic of your choice to schedule an appointment as soon as possible.    Sincerely     DOMINIK Beach MD, FAAFP  --------------------------------------------------------------------------------    PATIENT:   Yolanda Betts   YOB: 1970   DATE OF VISIT:  7/10/20    PHYSICIAN ORDER:  Pain Management Referral  SUPPORTING DIAGNOSES: Fibromyalgia, other chronic pain syndrome       DOMINIK Beach MD  [facility]  [address] [facility]  [place] [address]  PH: [phone]  FX: [phone]        REFERRAL TO PAIN MANAGEMENT SPECIALIST / PAIN MANAGMENT CLINIC    WHAT IS A PAIN MANAGEMENT SPECIALIST? A PAIN MANAGEMENT SPECIALIST is a doctor who diagnoses and treats chronic pain syndromes.     WHAT IS A PAIN MANAGEMENT " CLINIC? A PAIN MANAGEMENT CLINIC is a medical practice (doctors office) that is primarily engaged in the treatment of pain by prescribing medications such as narcotics. Pain management clinics in [place] must be licensed by the [place] Department of Health and Hospitals.    According to most recent information I have been given from [facility], there are eight licensed Pain Management Clinics in [place]. They are (in alphabetical order):    [facility]  [address] [place] [address]  Phone: [phone]    [facility]  [address] [place] [address] [address], [place] [address]  Phone: [phone]    [facility]  [address], [place] [address]  Phone: [phone]    [facility]  [address], [address], [place] [address]  Phone: [phone]    [facility]  [address] [place] [address], [address], [place] [address]  Phone: [phone]    [facility]  [address] [place] [address], [address], [place] [address]  Phone: [phone]    [facility] [place]  [address] , [address], [place] [address]  Phone: [phone]    [facility] [place]  [address] [facility], [address], [place] [address]  Phone: [phone]    NOTE: THE ABOVE INFORMATION MAY NOT BE ACCURATE OR UP TO DATE. YOU SHOULD CONTACT [facility] OR THE INDIVIDUAL OFFICES TO VERIFY THE CORRECT INFORMATION.        PAIN MANAGEMENT SPECIALIST THAT ARE NOT AT [facility] PAIN MANAGEMENT CLINICS INCLUDE:    [facility]  [address] [place] [address]  (Located inside [facility] off [facility])  [phone]  [email or web address]     [facility]  [address] [place] [address] [phone]  [email or web address]    NOTE: THE ABOVE INFORMATION MAY NOT BE ACCURATE OR UP TO DATE. YOU SHOULD CONTACT THE INDIVIDUAL OFFICES TO VERIFY THE CORRECT INFORMATION.    IMPORTANT INFORMATION:  It is  medically necessary for you to see a pain management specialist for proper diagnosis and treatment, because I am unable to meet your pain management needs.    WHAT TO DO NOW:  Call the pain management specialist or pain management clinic of your choice to schedule an appointment as soon as possible.

## 2020-07-10 NOTE — LETTER
07/10/2020   1970; MRN [id number]    Dear Yolanda,    Here is your referral to a pain management specialist I mentioned in my message.    Call the pain management specialist or pain management clinic of your choice to schedule an appointment as soon as possible.    Sincerely     DOMINIK Beach MD, [facility]  --------------------------------------------------------------------------------    PATIENT:   Yolanda Betts   YOB: 1970   DATE OF VISIT:  7/10/20    PHYSICIAN ORDER:  Pain Management Referral  SUPPORTING DIAGNOSES: Fibromyalgia, other chronic pain syndrome       DOMINIK Beach MD  [facility] [address]  [place] [address]  PH: [phone]  FX: [phone]        REFERRAL TO PAIN MANAGEMENT SPECIALIST / PAIN MANAGMENT CLINIC    WHAT IS A PAIN MANAGEMENT SPECIALIST? A PAIN MANAGEMENT SPECIALIST is a doctor who diagnoses and treats chronic pain syndromes.     WHAT IS A PAIN MANAGEMENT CLINIC? A PAIN MANAGEMENT CLINIC is a medical practice (doctors office) that is primarily engaged in the treatment of pain by prescribing medications such as narcotics. Pain management clinics in [place] must be licensed by the [facility] and [facility].    According to most recent information I have been given from [facility], there are eight licensed Pain Management Clinics in [place]. They are (in alphabetical order):    [facility]  [address], [place] [address]  Phone: [phone]    [facility]  [address] [place] [address] [address], [place] [address]  Phone: [phone]    [facility], [address], [place] [address]  Phone: [phone]    [facility]  [address] [facility], [facility] [address]  Phone: [phone]    [facility] [place]  [address] [place] [address], [address], [place] [address]  Phone: [phone]    [facility] [place] [address]  [facility] [address], [place] [address]  Phone: [phone]    [facility] [place]  [address] , [address], [place] [address]  Phone: [phone]    [facility] [place]  [address], [address], [place] [address]  Phone: [phone]    NOTE: THE ABOVE INFORMATION MAY NOT BE ACCURATE OR UP TO DATE. YOU SHOULD CONTACT [facility] OR THE INDIVIDUAL OFFICES TO VERIFY THE CORRECT INFORMATION.        PAIN MANAGEMENT SPECIALIST THAT ARE NOT AT [facility] PAIN MANAGEMENT CLINICS INCLUDE:    [facility]  [address] [place] [address]  (Located inside [facility] off [facility])  [phone]  [email or web address]     [facility]  [address] [place] [address] [phone]  [email or web address]    NOTE: THE ABOVE INFORMATION MAY NOT BE ACCURATE OR UP TO DATE. YOU SHOULD CONTACT THE INDIVIDUAL OFFICES TO VERIFY THE CORRECT INFORMATION.    IMPORTANT INFORMATION:  It is medically necessary for you to see a pain management specialist for proper diagnosis and treatment, because I am unable to meet your pain management needs.    WHAT TO DO NOW:  Call the pain management specialist or pain management clinic of your choice to schedule an appointment as soon as possible.

## 2020-07-10 NOTE — PROGRESS NOTES
RHEUMATOLOGY OUTPATIENT CLINIC NOTE    7/10/2020    Attending Rheumatologist: Abilio Gibbs  Primary Care Provider: CHARLA Beach MD   Physician Requesting Consultation: No referring provider defined for this encounter.  Chief Complaint/Reason For Consultation:  Chronic pain.    Subjective:       HPI  Yolanda Betts is a 50 y.o. Black or  female with chronic pain comes for follow-up.    Today  Last seen on January.  Provided with local corticosteroid injection left shoulder for DJD.  Refers suboptimal response to therapy.  Main complaint remains generalized arthralgias, worst on right shoulder today.  Pain is present throughout the whole day, aggravated by range of motion lying on affected side.  No relieved with the use of over-the-counter or neuropathic pain medication.  Denies fever, rash,  or GI complaints.    Review of Systems   Constitutional: Negative for chills, fever and malaise/fatigue.   Eyes: Negative for pain and redness.   Respiratory: Negative for cough, hemoptysis and shortness of breath.    Cardiovascular: Negative for chest pain and leg swelling.   Gastrointestinal: Negative for abdominal pain, blood in stool and melena.   Genitourinary: Negative for dysuria and hematuria.   Musculoskeletal: Positive for joint pain (Generalized, worst shoulders.  Mechanical pattern.). Negative for falls.   Skin: Negative for rash.   Neurological: Negative for tingling and focal weakness.   Psychiatric/Behavioral: Negative for memory loss. The patient does not have insomnia.      Chronic comorbid conditions affecting medical decision making today:  Past Medical History:   Diagnosis Date    Abnormal Pap smear of cervix     HPV genital warts    Anemia     Anxiety     Arthritis     Asthma 10/11/2016    Bipolar 1 disorder     Diabetes mellitus, type 2     Dyslipidemia associated with type 2 diabetes mellitus 5/13/2019    Genital warts     GERD (gastroesophageal reflux disease)   "   Herpes simplex virus (HSV) infection     Hypertension     Hypertension complicating diabetes 2019    Migraine with aura and without status migrainosus, not intractable 3/21/2016    Mild persistent asthma without complication 10/11/2016    Morbid obesity with body mass index (BMI) of 45.0 to 49.9 in adult 8/3/2017    Obstructive sleep apnea     ALEN (obstructive sleep apnea)     2L per N/C q HS    Schizoaffective disorder, bipolar type 2019    Seasonal allergic rhinitis due to pollen 2019    Type 2 diabetes mellitus with hyperglycemia, with long-term current use of insulin 2017    Type 2 diabetes mellitus with hyperglycemia, without long-term current use of insulin 2017     Past Surgical History:   Procedure Laterality Date    abcess removed right back      BELT ABDOMINOPLASTY      BREAST SURGERY Bilateral 1996    Reduction     SECTION      X 2    COLONOSCOPY      COLONOSCOPY N/A 2018    Procedure: colonoscopy for iron deficiency anemia;  Surgeon: Frankie Sanchez MD;  Location: [facility];  Service: Endoscopy;  Laterality: N/A;    COLONOSCOPY N/A 2018    Procedure: COLONOSCOPY;  Surgeon: Frankie Sanchez MD;  Location: [facility] END[facility];  Service: Endoscopy;  Laterality: N/A;    HYSTERECTOMY      w/ BSO; hypermenorrhea    MOUTH SURGERY      OOPHORECTOMY      hyst/bso, hypermenorrhea    ROBOT-ASSISTED CHOLECYSTECTOMY USING DA DARI XI N/A 1/3/2020    Procedure: XI ROBOTIC CHOLECYSTECTOMY;  Surgeon: Damir Driscoll MD;  Location: [facility] OR;  Service: General;  Laterality: N/A;    TUBAL LIGATION       Family History   Problem Relation Age of Onset    Diabetes Mother     Hyperlipidemia Mother     Hypertension Mother     Asthma Mother     COPD Mother     Glaucoma Mother     Thyroid disease Mother     Anesthesia problems Mother         "almost had a cardiac arrest" , blood clots    Hypertension Father     Hyperlipidemia Father     " Cancer Father         Brain, lung, liver, kidney    Heart disease Maternal Grandmother     Hyperlipidemia Maternal Grandmother     Hypertension Maternal Grandmother     Cataracts Maternal Grandmother     Diabetes Maternal Grandmother     Heart disease Maternal Grandfather     Hyperlipidemia Maternal Grandfather     Hypertension Maternal Grandfather     Glaucoma Maternal Grandfather     Cancer Maternal Grandfather     Cataracts Maternal Grandfather     Macular degeneration Maternal Grandfather     Diabetes Maternal Grandfather     Heart disease Paternal Grandmother     Hyperlipidemia Paternal Grandmother     Hypertension Paternal Grandmother     Cataracts Paternal Grandmother     Heart disease Paternal Grandfather     Hyperlipidemia Paternal Grandfather     Hypertension Paternal Grandfather     Cataracts Paternal Grandfather     Breast cancer Maternal Cousin     Breast cancer Maternal Cousin     Breast cancer Maternal Cousin     Breast cancer Maternal Cousin      Social History     Substance and Sexual Activity   Alcohol Use Not Currently    Alcohol/week: 0.0 standard drinks    Comment: socially  No alcohol 72h prior to sx     Social History     Tobacco Use   Smoking Status Never Smoker   Smokeless Tobacco Never Used     Social History     Substance and Sexual Activity   Drug Use No       Current Outpatient Medications:     albuterol (PROVENTIL) 2.5 mg /3 mL (0.083 %) nebulizer solution, Take 3 mLs (2.5 mg total) by nebulization every 4 (four) hours., Disp: 180 mL, Rfl: 11    ALPRAZolam (XANAX) 1 MG tablet, Take 1 tablet by mouth twice daily, Disp: 60 tablet, Rfl: 0    amLODIPine (NORVASC) 10 MG tablet, Take 1 tablet (10 mg total) by mouth once daily., Disp: 30 tablet, Rfl: 3    ARIPiprazole (ABILIFY) 10 MG Tab, Take 1 tablet by mouth at bedtime, Disp: 30 tablet, Rfl: 2    aspirin (ECOTRIN) 81 MG EC tablet, Take 81 mg by mouth once daily., Disp: , Rfl:     atorvastatin (LIPITOR) 20 MG  "tablet, Take 1 tablet (20 mg total) by mouth every evening., Disp: 30 tablet, Rfl: 0    BD INSULIN SYRINGE ULTRA-FINE 1/2 mL 30 gauge x 1/2" Syrg, , Disp: , Rfl: 0    benztropine (COGENTIN) 1 MG tablet, Take 1 tablet (1 mg total) by mouth every evening., Disp: 30 tablet, Rfl: 1    blood sugar diagnostic Strp, Check blood glucose 4 times daily as directed and as needed (dispense insurance preferred brand or patient choice), Disp: 200 each, Rfl: 5    blood-glucose meter Misc, Use to check blood sugars, give meter based on insurance specification, Disp: 1 each, Rfl: 1    cyclobenzaprine (FLEXERIL) 10 MG tablet, Take 1 tablet (10 mg total) by mouth daily as needed (for back muscle spasms)., Disp: 30 tablet, Rfl: 2    cyclosporine 0.05 % Drop, Place 1 drop into both eyes 2 (two) times daily., Disp: 5.5 mL, Rfl: 11    DULoxetine (CYMBALTA) 60 MG capsule, Take 1 capsule (60 mg total) by mouth 2 (two) times daily., Disp: 60 capsule, Rfl: 1    ergocalciferol (ERGOCALCIFEROL) 50,000 unit Cap, Take 1 capsule (50,000 Units total) by mouth every 14 (fourteen) days., Disp: 4 capsule, Rfl: 5    fish oil-omega-3 fatty acids 300-1,000 mg capsule, Take 1 capsule by mouth once daily., Disp: , Rfl:     furosemide (LASIX) 20 MG tablet, Take 1 tablet (20 mg total) by mouth once daily., Disp: 30 tablet, Rfl: 11    gabapentin (NEURONTIN) 300 MG capsule, Take 2 capsules (600 mg total) by mouth 3 (three) times daily., Disp: 90 capsule, Rfl: 3    hydrALAZINE (APRESOLINE) 10 MG tablet, Take 1 tablet (10 mg total) by mouth every 12 (twelve) hours., Disp: 60 tablet, Rfl: 11    insulin aspart U-100 (NOVOLOG FLEXPEN U-100 INSULIN) 100 unit/mL (3 mL) InPn pen, Inject before meals three times a day up to 30 units daily, Disp: 15 mL, Rfl: 3    lancets 28 gauge Misc, use as directed 3 times daily, Disp: 100 each, Rfl: 11    linaCLOtide (LINZESS) 145 mcg Cap capsule, Take 1 capsule (145 mcg total) by mouth daily as needed (for " "constipation)., Disp: 30 capsule, Rfl: 11    liraglutide 0.6 mg/0.1 mL, 18 mg/3 mL, subq PNIJ (VICTOZA 2-MILENA) 0.6 mg/0.1 mL (18 mg/3 mL) PnIj, Use 1.8mg under the skin daily, Disp: 9 mL, Rfl: 3    lurasidone (LATUDA) 120 mg Tab, Take 1 tablet by mouth everyday at 5 pm with food, Disp: 30 tablet, Rfl: 0    magnesium oxide (MAG-OX) 400 mg (241.3 mg magnesium) tablet, Take 1 tablet (400 mg total) by mouth once daily., Disp: 30 tablet, Rfl: 3    metFORMIN (GLUCOPHAGE) 1000 MG tablet, TAKE 1 TABLET (1,000 MG TOTAL) BY MOUTH 2 (TWO) TIMES DAILY., Disp: 180 tablet, Rfl: 0    metoprolol succinate (TOPROL-XL) 100 MG 24 hr tablet, Take 2 tablets (200 mg total) by mouth every evening., Disp: 180 tablet, Rfl: 3    mirtazapine (REMERON) 45 MG tablet, Take 1 tablet by mouth every evevning, Disp: 30 tablet, Rfl: 0    mometasone (ASMANEX TWISTHALER) 220 mcg/ actuation (120) AePB, Inhale 2 puffs into the lungs 2 (two) times daily. Controller, Disp: 1 each, Rfl: 11    montelukast (SINGULAIR) 10 mg tablet, Take 1 tablet (10 mg total) by mouth every evening., Disp: 30 tablet, Rfl: 11    MULTIVIT,CALC,MINS/IRON/FOLIC (DAILY MULTIPLE FOR WOMEN ORAL), Take 1 tablet by mouth once daily., Disp: , Rfl:     pen needle, diabetic (BD ULTRA-FINE MINI PEN NEEDLE) 31 gauge x 3/16" Ndle, Use 5 a day, Disp: 200 each, Rfl: 3    spironolactone (ALDACTONE) 25 MG tablet, Take 1 tablet (25 mg total) by mouth once daily., Disp: 30 tablet, Rfl: 6    temazepam (RESTORIL) 15 mg Cap, Take 1 capsule by mouth at bedtime, Disp: 30 capsule, Rfl: 1    topiramate (TOPAMAX) 50 MG tablet, Take 1 tablet (50 mg total) by mouth 2 (two) times daily., Disp: 60 tablet, Rfl: 11    TOUJEO MAX U-300 SOLOSTAR 300 unit/mL (3 mL) InPn, Inject 90 units once daily at bedtime, Disp: 15 mL, Rfl: 3    valACYclovir (VALTREX) 1000 MG tablet, Take 1 tablet (1,000 mg total) by mouth once daily., Disp: 30 tablet, Rfl: 11    valsartan (DIOVAN) 320 MG tablet, Take 1 tablet " "(320 mg total) by mouth once daily., Disp: 90 tablet, Rfl: 3    ARIPiprazole (ABILIFY) 10 MG Tab, Take 1 tablet (10 mg total) by mouth once daily. (Patient not taking: Reported on 7/9/2020), Disp: 30 tablet, Rfl: 2    DULoxetine (CYMBALTA) 60 MG capsule, Take 1 capsule by mouth twice daily (Patient not taking: Reported on 7/9/2020), Disp: 60 capsule, Rfl: 0     Objective:         Vitals:    07/10/20 1008   BP: (!) 141/90   Pulse: 90     Physical Exam   Constitutional: No distress.   Estimated body mass index is 46.56 kg/m² as calculated from the following:    Height as of this encounter: 4' 8" (1.422 m).    Weight as of this encounter: 94.2 kg (207 lb 10.8 oz).    Wt Readings from Last 1 Encounters:  07/10/20 1008 : 94.2 kg (207 lb 10.8 oz)     HENT:   Head: Normocephalic and atraumatic.   Eyes: Conjunctivae are normal. Pupils are equal, round, and reactive to light.   Neck: Normal range of motion.   Cardiovascular: Normal rate and intact distal pulses.    Pulmonary/Chest: Effort normal. No respiratory distress.   Abdominal: Soft. She exhibits no distension.   Neurological: She is alert. Gait normal.   Skin: No rash noted. No erythema.     Musculoskeletal: Normal range of motion.      Comments: : strong  No synovitis or significant squeeze tenderness    AROM: intact  PROM: intact    Pain referred with shoulder abduction past 40°, worst right side.    Devices used by patient: none       Reviewed old and all outside pertinent medical records available.    All lab results personally reviewed and interpreted by me.  Lab Results   Component Value Date    WBC 9.40 06/26/2020    HGB 12.2 06/26/2020    HCT 40.2 06/26/2020    MCV 92 06/26/2020    MCH 27.9 06/26/2020    MCHC 30.3 (L) 06/26/2020    RDW 14.1 06/26/2020     06/26/2020    MPV 9.9 06/26/2020       Lab Results   Component Value Date     (L) 06/26/2020    K 5.1 06/26/2020    CL 99 06/26/2020    CO2 23 06/26/2020     (H) 06/26/2020    BUN 11 " 06/26/2020    CALCIUM 9.9 06/26/2020    PROT 8.0 06/26/2020    ALBUMIN 4.0 06/26/2020    BILITOT 0.2 06/26/2020    AST 59 (H) 06/26/2020    ALKPHOS 122 06/26/2020    ALT 56 (H) 06/26/2020       Lab Results   Component Value Date    COLORU Yellow 06/26/2020    APPEARANCEUA Clear 06/26/2020    SPECGRAV 1.020 06/26/2020    PHUR 6.0 06/26/2020    PROTEINUA Negative 06/26/2020    KETONESU Negative 06/26/2020    LEUKOCYTESUR Negative 06/26/2020    NITRITE Negative 06/26/2020    UROBILINOGEN Negative 06/26/2020       Lab Results   Component Value Date    CRP 31.5 (H) 10/14/2019       Lab Results   Component Value Date    SEDRATE 67 (H) 10/14/2019       Lab Results   Component Value Date    RF <10.0 10/14/2019    SEDRATE 67 (H) 10/14/2019       No components found for: 25OHVITDTOT, 02JDSKFY5, 16JDQGGE0, METHODNOTE    Lab Results   Component Value Date    URICACID 6.0 (H) 08/12/2019       No components found for: TSPOTTB    Rheum Labs:  ISAAC negative  Rheumatoid factor/CCP negative     Infectious Labs:  n/a     Imaging:  All imaging reviewed and independently  interpreted by me.    Chest x-ray January 2020  The lungs are clear. The cardiac silhouette is within normal limits. The bones are intact.    X-ray shoulder May 2020  No acute fracture or dislocation.  No significant AC joint arthropathy.  No significant degenerative change at the glenohumeral joint.     ASSESSMENT / PLAN:     Yolanda Betts is a 50 y.o. Black or  female with:    1. Chronic Shoulder pain  - presentation consistent with shoulder tendinopathy.  - patient reports being unable to attend physical therapy due to transportation issues  - refers no response to local corticosteroid injection given last visit  - refractory to medical management, currently on several neuropathic pain medications and muscle relaxants with no response  - will order MRI right shoulder to assess for soft tissue injury.  - recommend to reestablish care for further  evaluation management by Orthopedics  - consider pain management referral by primary care physician    2.  Chronic pain syndrome  - sleep hygiene, stress management  - continue with gabapentin as tolerated.   - continue with duloxetine.  I do not recommend more than 60 mg per day.  - manage expectations, reassurance, Physical activity    3. Other specified counseling  - Immunization counseling done.  - Weight loss counseling done.  - Nutrition and exercise counseling.  - Regular exercise:  Aerobic and resistance.  - Medication counseling provided.    No follow-ups on file.  RTC 6 months    Method of contact with patient concerns: [facility]    Disclaimer:  This note is prepared using voice recognition software and as such is likely to have errors and has not been proof read. Please contact me for questions.     Abilio Gibbs M.D.  Rheumatology Department   [facility] [facility]

## 2020-07-10 NOTE — TELEPHONE ENCOUNTER
Spoke with patient and she stated that she saw Dr. Gibbs in Rheumatology and he said that there was nothing more he can do to help with her pain and asked her to check with Dr. Beach for a referral to pain management. She would like the referral placed. She also stated that she took her Toujeo last night and then accidentally took it this morning as well. She said that she has not had time to check her blood glucose today but has been fine and wants to know if she should still take her dose this evening.

## 2020-07-10 NOTE — TELEPHONE ENCOUNTER
----- Message from Negra Conway sent at 7/10/2020  2:29 PM CDT -----  Regarding: referral request  PILO COLSTON calling regarding Patient Advice (message) for #needing a call from the office to request a referral to the pain management office . please contact pt [phone]

## 2020-07-13 NOTE — PROGRESS NOTES
Yolanda Tate.    I am happy to report that your recent breast imaging did NOT show evidence of cancer.    An annual mammogram is the best test to screen for breast cancer, but it is not perfect, and it can miss some cancers. So, even though your mammogram was normal, if you notice any lump or change in one of your breasts, please schedule an appointment with me for a proper evaluation.    Thanks for letting me care for you, thanks for trusting us with your healthcare needs, and thanks for using [facility].    Sincerely,    DOMINIK Beach MD

## 2020-07-14 ENCOUNTER — OFFICE VISIT (OUTPATIENT)
Dept: PODIATRY | Facility: CLINIC | Age: 50
End: 2020-07-14
Payer: MEDICAID

## 2020-07-14 VITALS
BODY MASS INDEX: 46.66 KG/M2 | HEIGHT: 56 IN | DIASTOLIC BLOOD PRESSURE: 91 MMHG | HEART RATE: 95 BPM | SYSTOLIC BLOOD PRESSURE: 131 MMHG | WEIGHT: 207.44 LBS

## 2020-07-14 DIAGNOSIS — L84 CORN OR CALLUS: ICD-10-CM

## 2020-07-14 DIAGNOSIS — E11.42 DIABETIC POLYNEUROPATHY ASSOCIATED WITH TYPE 2 DIABETES MELLITUS: Primary | Chronic | ICD-10-CM

## 2020-07-14 DIAGNOSIS — M20.42 HAMMER TOES OF BOTH FEET: ICD-10-CM

## 2020-07-14 DIAGNOSIS — M20.41 HAMMER TOES OF BOTH FEET: ICD-10-CM

## 2020-07-14 PROCEDURE — 99214 PR OFFICE/OUTPT VISIT, EST, LEVL IV, 30-39 MIN: ICD-10-PCS | Mod: S$PBB,,, | Performed by: PODIATRIST

## 2020-07-14 PROCEDURE — 99214 OFFICE O/P EST MOD 30 MIN: CPT | Mod: S$PBB,,, | Performed by: PODIATRIST

## 2020-07-14 PROCEDURE — 99999 PR PBB SHADOW E&M-EST. PATIENT-LVL V: ICD-10-PCS | Mod: PBBFAC,,, | Performed by: PODIATRIST

## 2020-07-14 PROCEDURE — 99999 PR PBB SHADOW E&M-EST. PATIENT-LVL V: CPT | Mod: PBBFAC,,, | Performed by: PODIATRIST

## 2020-07-14 PROCEDURE — 99215 OFFICE O/P EST HI 40 MIN: CPT | Mod: PBBFAC | Performed by: PODIATRIST

## 2020-07-14 RX ORDER — AMMONIUM LACTATE 12 G/100G
1 CREAM TOPICAL 2 TIMES DAILY
Qty: 140 G | Refills: 3 | Status: SHIPPED | OUTPATIENT
Start: 2020-07-14 | End: 2021-04-26

## 2020-07-14 NOTE — PROGRESS NOTES
Subjective:     Patient ID: Yolanda Betts is a 50 y.o. female.    Chief Complaint: Diabetic Foot Exam (c/o bilateral numbness and tingingling, bilateral dry skin to heels. wears tennis shoes with socks. diabetic Pt. last seen on 07/07/20 with PCP Dr. Beach)    Yolanda is a 50 y.o. female who presents to the clinic for evaluation and treatment of high risk feet. Yolanda has a past medical history of Abnormal Pap smear of cervix, Anemia, Anxiety, Arthritis, Asthma (10/11/2016), Bipolar 1 disorder, Diabetes mellitus, type 2, Dyslipidemia associated with type 2 diabetes mellitus (5/13/2019), Genital warts, GERD (gastroesophageal reflux disease), Herpes simplex virus (HSV) infection, Hypertension, Hypertension complicating diabetes (5/5/2019), Migraine with aura and without status migrainosus, not intractable (3/21/2016), Mild persistent asthma without complication (10/11/2016), Morbid obesity with body mass index (BMI) of 45.0 to 49.9 in adult (8/3/2017), Obstructive sleep apnea, ALEN (obstructive sleep apnea), Schizoaffective disorder, bipolar type (4/25/2019), Seasonal allergic rhinitis due to pollen (5/13/2019), Type 2 diabetes mellitus with hyperglycemia, with long-term current use of insulin (12/21/2017), and Type 2 diabetes mellitus with hyperglycemia, without long-term current use of insulin (12/21/2017). The patient's chief complaint is numbness of her toes. Patient states the diabetic shoes have helped a lot. Patient states the skin on her heels painful.  This patient has documented high risk feet requiring routine maintenance secondary to diabetes mellitis and those secondary complications of diabetes, as mentioned..    PCP: CHARLA Beach MD    Date Last Seen by PCP: 07/07/2020    Current shoe gear:  Affected Foot: Tennis shoes with diabetic inserts     Unaffected Foot: Tennis shoes with diabetic inserts    Hemoglobin A1C   Date Value Ref Range Status   07/07/2020 11.6 (H) 4.0 - 5.6 % Final      Comment:     ADA Screening Guidelines:  5.7-6.4%  Consistent with prediabetes  >or=6.5%  Consistent with diabetes  High levels of fetal hemoglobin interfere with the HbA1C  assay. Heterozygous hemoglobin variants (HbS, HgC, etc)do  not significantly interfere with this assay.   However, presence of multiple variants may affect accuracy.     02/18/2020 7.1 (H) 4.0 - 5.6 % Final     Comment:     ADA Screening Guidelines:  5.7-6.4%  Consistent with prediabetes  >or=6.5%  Consistent with diabetes  High levels of fetal hemoglobin interfere with the HbA1C  assay. Heterozygous hemoglobin variants (HbS, HgC, etc)do  not significantly interfere with this assay.   However, presence of multiple variants may affect accuracy.     11/18/2019 9.9 (H) 4.0 - 5.6 % Final     Comment:     ADA Screening Guidelines:  5.7-6.4%  Consistent with prediabetes  >or=6.5%  Consistent with diabetes  High levels of fetal hemoglobin interfere with the HbA1C  assay. Heterozygous hemoglobin variants (HbS, HgC, etc)do  not significantly interfere with this assay.   However, presence of multiple variants may affect accuracy.             Patient Active Problem List   Diagnosis    GERD (gastroesophageal reflux disease)    Allergic rhinitis    Menopausal syndrome (hot flashes)    Migraine with aura and without status migrainosus, not intractable    Abnormal ECG    Sleep-related bruxism    Diabetic polyneuropathy associated with type 2 diabetes mellitus    Mild persistent asthma without complication    Bipolar 1 disorder    Diffusion capacity of lung (dl), decreased    Hypertriglyceridemia    Anxiety    Type 2 diabetes mellitus with hyperglycemia, with long-term current use of insulin    Iron deficiency anemia    Screen for colon cancer    Schizoaffective disorder, bipolar type    Vitamin D deficiency    Fibromyalgia    Hypertension complicating diabetes    Dyslipidemia associated with type 2 diabetes mellitus    Seasonal allergic  "rhinitis due to pollen    Nocturnal hypoxemia due to obesity - WITHOUT ALEN    Obesity hypoventilation syndrome    Hyperaldosteronism    Morbid obesity with BMI of 45.0-49.9, adult    Calculus of gallbladder without cholecystitis without obstruction    Cholelithiasis    High risk of appointment cancellation or no-show    Irritable bowel syndrome with both constipation and diarrhea    Acute right-sided thoracic back pain       Medication List with Changes/Refills   New Medications    AMMONIUM LACTATE 12 % CREA    Apply 1 Act topically 2 (two) times daily.   Current Medications    ALBUTEROL (PROVENTIL) 2.5 MG /3 ML (0.083 %) NEBULIZER SOLUTION    Take 3 mLs (2.5 mg total) by nebulization every 4 (four) hours.    ALPRAZOLAM (XANAX) 1 MG TABLET    Take 1 tablet by mouth twice daily    AMLODIPINE (NORVASC) 10 MG TABLET    Take 1 tablet (10 mg total) by mouth once daily.    ARIPIPRAZOLE (ABILIFY) 10 MG TAB    Take 1 tablet by mouth at bedtime    ARIPIPRAZOLE (ABILIFY) 10 MG TAB    Take 1 tablet (10 mg total) by mouth once daily.    ASPIRIN (ECOTRIN) 81 MG EC TABLET    Take 81 mg by mouth once daily.    ATORVASTATIN (LIPITOR) 20 MG TABLET    Take 1 tablet (20 mg total) by mouth every evening.    BD INSULIN SYRINGE ULTRA-FINE 1/2 ML 30 GAUGE X 1/2" SYRG        BENZTROPINE (COGENTIN) 1 MG TABLET    Take 1 tablet (1 mg total) by mouth every evening.    BLOOD SUGAR DIAGNOSTIC STRP    Check blood glucose 4 times daily as directed and as needed (dispense insurance preferred brand or patient choice)    BLOOD-GLUCOSE METER MISC    Use to check blood sugars, give meter based on insurance specification    CYCLOBENZAPRINE (FLEXERIL) 10 MG TABLET    Take 1 tablet (10 mg total) by mouth daily as needed (for back muscle spasms).    CYCLOSPORINE 0.05 % DROP    Place 1 drop into both eyes 2 (two) times daily.    DULOXETINE (CYMBALTA) 60 MG CAPSULE    Take 1 capsule (60 mg total) by mouth 2 (two) times daily.    DULOXETINE " "(CYMBALTA) 60 MG CAPSULE    Take 1 capsule by mouth twice daily    ERGOCALCIFEROL (ERGOCALCIFEROL) 50,000 UNIT CAP    Take 1 capsule (50,000 Units total) by mouth every 14 (fourteen) days.    FISH OIL-OMEGA-3 FATTY ACIDS 300-1,000 MG CAPSULE    Take 1 capsule by mouth once daily.    FUROSEMIDE (LASIX) 20 MG TABLET    Take 1 tablet (20 mg total) by mouth once daily.    GABAPENTIN (NEURONTIN) 300 MG CAPSULE    Take 2 capsules (600 mg total) by mouth 3 (three) times daily.    HYDRALAZINE (APRESOLINE) 10 MG TABLET    Take 1 tablet (10 mg total) by mouth every 12 (twelve) hours.    INSULIN ASPART U-100 (NOVOLOG FLEXPEN U-100 INSULIN) 100 UNIT/ML (3 ML) INPN PEN    Inject before meals three times a day up to 30 units daily    LANCETS 28 GAUGE MISC    use as directed 3 times daily    LINACLOTIDE (LINZESS) 145 MCG CAP CAPSULE    Take 1 capsule (145 mcg total) by mouth daily as needed (for constipation).    LIRAGLUTIDE 0.6 MG/0.1 ML, 18 MG/3 ML, SUBQ PNIJ (VICTOZA 2-MILENA) 0.6 MG/0.1 ML (18 MG/3 ML) PNIJ    Use 1.8mg under the skin daily    LURASIDONE (LATUDA) 120 MG TAB    Take 1 tablet by mouth everyday at 5 pm with food    MAGNESIUM OXIDE (MAG-OX) 400 MG (241.3 MG MAGNESIUM) TABLET    Take 1 tablet (400 mg total) by mouth once daily.    METFORMIN (GLUCOPHAGE) 1000 MG TABLET    TAKE 1 TABLET (1,000 MG TOTAL) BY MOUTH 2 (TWO) TIMES DAILY.    METOPROLOL SUCCINATE (TOPROL-XL) 100 MG 24 HR TABLET    Take 2 tablets (200 mg total) by mouth every evening.    MIRTAZAPINE (REMERON) 45 MG TABLET    Take 1 tablet by mouth every evevning    MOMETASONE (ASMANEX TWISTHALER) 220 MCG/ ACTUATION (120) AEPB    Inhale 2 puffs into the lungs 2 (two) times daily. Controller    MONTELUKAST (SINGULAIR) 10 MG TABLET    Take 1 tablet (10 mg total) by mouth every evening.    MULTIVIT,CALC,MINS/IRON/FOLIC (DAILY MULTIPLE FOR WOMEN ORAL)    Take 1 tablet by mouth once daily.    PEN NEEDLE, DIABETIC (BD ULTRA-FINE MINI PEN NEEDLE) 31 GAUGE X 3/16" NDLE  "   Use 5 a day    SPIRONOLACTONE (ALDACTONE) 25 MG TABLET    Take 1 tablet (25 mg total) by mouth once daily.    TEMAZEPAM (RESTORIL) 15 MG CAP    Take 1 capsule by mouth at bedtime    TOPIRAMATE (TOPAMAX) 50 MG TABLET    Take 1 tablet (50 mg total) by mouth 2 (two) times daily.    TOUJEO MAX U-300 SOLOSTAR 300 UNIT/ML (3 ML) INPN    Inject 90 units once daily at bedtime    VALACYCLOVIR (VALTREX) 1000 MG TABLET    Take 1 tablet (1,000 mg total) by mouth once daily.    VALSARTAN (DIOVAN) 320 MG TABLET    Take 1 tablet (320 mg total) by mouth once daily.       Review of patient's allergies indicates:   Allergen Reactions    Codeine Itching    Ibuprofen     Neuromuscular blockers, steroidal Hives     some    Penicillins     Sulfamethoxazole-trimethoprim     Latex, natural rubber Rash    Morphine Rash     itching    Norco [hydrocodone-acetaminophen] Itching, Rash and Hallucinations    Seconal [secobarbital sodium] Rash     itching    Tylox [oxycodone-acetaminophen] Rash       Past Surgical History:   Procedure Laterality Date    abcess removed right back      BELT ABDOMINOPLASTY      BREAST SURGERY Bilateral 1996    Reduction     SECTION      X 2    COLONOSCOPY      COLONOSCOPY N/A 2018    Procedure: colonoscopy for iron deficiency anemia;  Surgeon: Frankie Sanchez MD;  Location: [facility] ENDO;  Service: Endoscopy;  Laterality: N/A;    COLONOSCOPY N/A 2018    Procedure: COLONOSCOPY;  Surgeon: Frankie Sanchez MD;  Location: [facility] ENDO;  Service: Endoscopy;  Laterality: N/A;    HYSTERECTOMY      w/ BSO; hypermenorrhea    MOUTH SURGERY      OOPHORECTOMY      hyst/bso, hypermenorrhea    ROBOT-ASSISTED CHOLECYSTECTOMY USING DA DARI XI N/A 1/3/2020    Procedure: XI ROBOTIC CHOLECYSTECTOMY;  Surgeon: Damir Driscoll MD;  Location: [facility] OR;  Service: General;  Laterality: N/A;    TOTAL REDUCTION MAMMOPLASTY      TUBAL LIGATION         Family History   Problem Relation Age  "of Onset    Diabetes Mother     Hyperlipidemia Mother     Hypertension Mother     Asthma Mother     COPD Mother     Glaucoma Mother     Thyroid disease Mother     Anesthesia problems Mother         "almost had a cardiac arrest" , blood clots    Hypertension Father     Hyperlipidemia Father     Cancer Father         Brain, lung, liver, kidney    Heart disease Maternal Grandmother     Hyperlipidemia Maternal Grandmother     Hypertension Maternal Grandmother     Cataracts Maternal Grandmother     Diabetes Maternal Grandmother     Heart disease Maternal Grandfather     Hyperlipidemia Maternal Grandfather     Hypertension Maternal Grandfather     Glaucoma Maternal Grandfather     Cancer Maternal Grandfather     Cataracts Maternal Grandfather     Macular degeneration Maternal Grandfather     Diabetes Maternal Grandfather     Heart disease Paternal Grandmother     Hyperlipidemia Paternal Grandmother     Hypertension Paternal Grandmother     Cataracts Paternal Grandmother     Heart disease Paternal Grandfather     Hyperlipidemia Paternal Grandfather     Hypertension Paternal Grandfather     Cataracts Paternal Grandfather     Breast cancer Maternal Cousin     Breast cancer Maternal Cousin     Breast cancer Maternal Cousin     Breast cancer Maternal Cousin        Social History     Socioeconomic History    Marital status: Single     Spouse name: Not on file    Number of children: Not on file    Years of education: Not on file    Highest education level: Not on file   Occupational History    Not on file   Social Needs    Financial resource strain: Not on file    Food insecurity     Worry: Not on file     Inability: Not on file    Transportation needs     Medical: Not on file     Non-medical: Not on file   Tobacco Use    Smoking status: Never Smoker    Smokeless tobacco: Never Used   Substance and Sexual Activity    Alcohol use: Not Currently     Alcohol/week: 0.0 standard drinks " "    Comment: socially  No alcohol 72h prior to sx    Drug use: No    Sexual activity: Not Currently     Partners: Male   Lifestyle    Physical activity     Days per week: Not on file     Minutes per session: Not on file    Stress: Not on file   Relationships    Social connections     Talks on phone: Not on file     Gets together: Not on file     Attends [employer] service: Not on file     Active member of club or organization: Not on file     Attends meetings of clubs or organizations: Not on file     Relationship status: Not on file   Other Topics Concern    Not on file   Social History Narrative    Long-term care nurse       Vitals:    07/14/20 0803   BP: (!) 131/91   Pulse: 95   Weight: 94.1 kg (207 lb 7.3 oz)   Height: 4' 8" (1.422 m)   PainSc: 0-No pain   PainLoc: Foot       Hemoglobin A1C   Date Value Ref Range Status   07/07/2020 11.6 (H) 4.0 - 5.6 % Final     Comment:     ADA Screening Guidelines:  5.7-6.4%  Consistent with prediabetes  >or=6.5%  Consistent with diabetes  High levels of fetal hemoglobin interfere with the HbA1C  assay. Heterozygous hemoglobin variants (HbS, HgC, etc)do  not significantly interfere with this assay.   However, presence of multiple variants may affect accuracy.     02/18/2020 7.1 (H) 4.0 - 5.6 % Final     Comment:     ADA Screening Guidelines:  5.7-6.4%  Consistent with prediabetes  >or=6.5%  Consistent with diabetes  High levels of fetal hemoglobin interfere with the HbA1C  assay. Heterozygous hemoglobin variants (HbS, HgC, etc)do  not significantly interfere with this assay.   However, presence of multiple variants may affect accuracy.     11/18/2019 9.9 (H) 4.0 - 5.6 % Final     Comment:     ADA Screening Guidelines:  5.7-6.4%  Consistent with prediabetes  >or=6.5%  Consistent with diabetes  High levels of fetal hemoglobin interfere with the HbA1C  assay. Heterozygous hemoglobin variants (HbS, HgC, etc)do  not significantly interfere with this assay.   However, presence " of multiple variants may affect accuracy.         Review of Systems   Constitutional: Negative for chills and fever.   Respiratory: Negative for shortness of breath.    Cardiovascular: Negative for chest pain, palpitations, orthopnea, claudication and leg swelling.   Gastrointestinal: Negative for diarrhea, nausea and vomiting.   Musculoskeletal: Negative for joint pain.   Skin: Negative for rash.   Neurological: Positive for tingling. Negative for dizziness, sensory change, focal weakness and weakness.   Psychiatric/Behavioral: Negative.          Objective:   PHYSICAL EXAM: Apperance: Alert and orient in no distress,well developed, and with good attention to grooming and body habits  Patient presents ambulating in flip flops  LOWER EXTREMITY EXAM:  VASCULAR: Dorsalis pedis pulses 2/4 bilateral and Posterior Tibial pulses 2/4 bilateral. Capillary fill time <4 seconds bilateral. No edema observed bilateral. Varicosities absent bilateral. Skin temperature of the lower extremities is warm to warm, proximal to distal. Hair growth WNL bilateral.  DERMATOLOGICAL: No skin rashes, subcutaneous nodules, lesions, or ulcers observed bilateral. Nails 1,2,3,4,5 bilateral normal length and thickness. Webspaces 1,2,3,4 bilateral clean, dry and without evidence of break in skin integrity. Dry skin noted to bilateral heels.   NEUROLOGICAL: Light touch, sharp-dull, proprioception all present and equal bilaterally.  Vibratory sensation diminished at bilateral hallux. Protective sensation absent at toe sites as tested with a [place]-Rhoda 5.07 monofilament.   MUSCULOSKELETAL: Muscle strength is 5/5 for foot inverters, everters, plantarflexors, and dorsiflexors. Muscle tone is normal. Ankle joints bilateral shows decreased ROM. bilateral ankle ROM shows greater decrease in dorsiflexion with knee extended. Ankle joint ROM is pain free and without crepitus bilateral.       Assessment:   Diabetic polyneuropathy associated with type 2  diabetes mellitus  -     Ambulatory referral/consult to Podiatry  -     DIABETIC SHOES FOR HOME USE    Hammer toes of both feet  -     DIABETIC SHOES FOR HOME USE    Corn or callus  -     ammonium lactate 12 % Crea; Apply 1 Act topically 2 (two) times daily.  Dispense: 1 Tube; Refill: 3  -     DIABETIC SHOES FOR HOME USE          Plan:   Diabetic polyneuropathy associated with type 2 diabetes mellitus  -     Ambulatory referral/consult to Podiatry  -     DIABETIC SHOES FOR HOME USE    Hammer toes of both feet  -     DIABETIC SHOES FOR HOME USE    Corn or callus  -     ammonium lactate 12 % Crea; Apply 1 Act topically 2 (two) times daily.  Dispense: 1 Tube; Refill: 3  -     DIABETIC SHOES FOR HOME USE      I counseled the patient on her conditions, regarding findings of my examination, my impressions, and usual treatment plan.   Greater than 50% of this visit spent on counseling and coordination of care.  Greater than 15 minutes of a 20 minute appointment spent on education about the diabetic foot, neuropathy, and prevention of limb loss.  Shoe inspection. Diabetic Foot Education. Patient reminded of the importance of good nutrition and blood sugar control to help prevent podiatric complications of diabetes. Patient instructed on proper foot hygeine. We discussed wearing proper shoe gear, daily foot inspections, never walking without protective shoe gear, never putting sharp instruments to feet.    Prescription written for Ammonium Lactate 12% cream to be applied twice daily to area for inflammation.  Prescription written for Diabetic shoes and inserts.   Patient  will continue to monitor the areas daily, inspect feet, wear protective shoe gear when ambulatory, moisturizer to maintain skin integrity. Patient reminded of the importance of good nutrition and blood sugar control to help prevent podiatric complications of diabetes.  Patient to return 12 months or sooner if needed.                 Alfred Sweeney,  DPM [facility]

## 2020-07-14 NOTE — LETTER
July 14, 2020      DOMINIK Beach MD  [address] [place] [address]           [facility]  [address] [place] [address]  Phone: [phone]  Fax: [phone]          Patient: Yolanda Betts   MR Number: [id number]   YOB: 1970   Date of Visit: 7/14/2020       Dear Dr. DOMINIK Beach:    Thank you for referring Yolanda Betts to me for evaluation. Attached you will find relevant portions of my assessment and plan of care.    If you have questions, please do not hesitate to call me. I look forward to following Yolanda Betts along with you.    Sincerely,    Alfred Sweeney DPM    Enclosure  CC:  No Recipients    If you would like to receive this communication electronically, please contact [email or web address] or [phone] to request more information on [employer] access.    For providers and/or their staff who would like to refer a patient to [facility], please contact us through our one-stop-shop provider referral line, [facility], at [phone].    If you feel you have received this communication in error or would no longer like to receive these types of communications, please e-mail [email or web address]

## 2020-07-16 ENCOUNTER — TELEPHONE (OUTPATIENT)
Dept: ENDOCRINOLOGY | Facility: CLINIC | Age: 50
End: 2020-07-16

## 2020-07-16 ENCOUNTER — TELEPHONE (OUTPATIENT)
Dept: INTERNAL MEDICINE | Facility: CLINIC | Age: 50
End: 2020-07-16

## 2020-07-16 ENCOUNTER — NURSE TRIAGE (OUTPATIENT)
Dept: ADMINISTRATIVE | Facility: CLINIC | Age: 50
End: 2020-07-16

## 2020-07-16 NOTE — TELEPHONE ENCOUNTER
Pt is going into ER for fluids to help lower her BG      ----- Message from Antonio Matta sent at 7/16/2020  1:34 PM CDT -----  Regarding: pt  Pt would  like a call from nurse in regards to high blood sugar; pt didn't want to speak with an on call nurse.. please call back at .[phone] (home)       Thank You,   Antonio Matta

## 2020-07-16 NOTE — TELEPHONE ENCOUNTER
----- Message from Lisa Burton sent at 7/16/2020  1:49 PM CDT -----  Regarding: High Glucose levels  Type:  Needs Medical Advice    Who Called: Yolanda Betts  Symptoms (please be specific): high sugar levels  How long has patient had these symptoms:  3 days now   Would the patient rather a call back or a response via [facility]? callback  Best Call Back Number: [phone]  Additional Information: Says her blood glucose levels and high and asked if she can get a callback to get instuctions on what to do

## 2020-07-16 NOTE — TELEPHONE ENCOUNTER
S/W pt, she stated her sugars are running high and there has been no changes in her medications or diet. I informed the pt Dr. Beach is out of the clinic, but I would send this to the on call provider. Pt verbalized understanding/jjames

## 2020-07-16 NOTE — TELEPHONE ENCOUNTER
Bs ranges from 400-263. Bs today was 360 something. Cough. Pt stated her blood sugar has been higher than 300 more than 2 times in a row. Care advice recommend she receives a call from Md office within 1 hour. Please call and advise.     Reason for Disposition   Blood glucose > 300 mg/dl (16.7 mmol/l) AND two or more times in a row    Additional Information   Negative: Unconscious or difficult to awaken   Negative: Acting confused (e.g., disoriented, slurred speech)   Negative: Very weak (can't stand)   Negative: Sounds like a life-threatening emergency to the triager   Negative: Vomiting and signs of dehydration (e.g., very dry mouth, lightheaded, etc.)   Negative: Blood glucose > 240 mg/dl (13 mmol/l) and rapid breathing   Negative: Blood glucose > 500 mg/dl (27.5 mmol/l)   Negative: Blood glucose > 240 mg/dl (13 mmol/l) AND urine ketones moderate-large (or more than 1+)   Negative: Blood glucose > 240 mg/dl (13 mmol/l) and blood ketones > 1.5 mmol/l   Negative: Blood glucose > 240 mg/dl (13 mmol/l) AND vomiting AND unable to check for ketones (in blood or urine)   Negative: Vomiting lasting > 4 hours   Negative: Patient sounds very sick or weak to the triager   Negative: Fever > 100.5 F (38.1 C)   Negative: Caller has URGENT medication or insulin pump question and triager unable to answer question   Negative: Blood glucose > 400 mg/dl (22 mmol/l)    Protocols used: DIABETES - HIGH BLOOD SUGAR-A-OH

## 2020-07-16 NOTE — TELEPHONE ENCOUNTER
Sugar has been high for a few days she has monitor from digital diabetes  7/10  442   7/13  369  7/14    338 ,  258, 359, 418  7/15   279 , 328,  320, 219  7/16   363   No changes in food or medication

## 2020-07-17 ENCOUNTER — PATIENT OUTREACH (OUTPATIENT)
Dept: ADMINISTRATIVE | Facility: OTHER | Age: 50
End: 2020-07-17

## 2020-07-17 ENCOUNTER — TELEPHONE (OUTPATIENT)
Dept: INTERNAL MEDICINE | Facility: CLINIC | Age: 50
End: 2020-07-17

## 2020-07-17 NOTE — TELEPHONE ENCOUNTER
Per Dr. Martinez patient should increase Novolog from 25 to 30 units after every meal if sugars stay higher than 200's in addition to her other diabetic medications. Patient notified and verbalized understanding. Patient has no questions at this time.

## 2020-07-17 NOTE — TELEPHONE ENCOUNTER
Spoke with patient she states that her blood sugar at 6:22 pm was 343.  Patient took Novolog 25 units at 6:30 pm.  Current blood sugar 222 at 7:18 pm.  Patient states that she normally takes Victoza 1.8 units in the am, Toujeo 90 units at night, Novolog 25 units before meals, and Metformin 1000 mg BID.  Patient states that called office today and no one followed up with her.  Patient Denies having any rapid breathing or feeling weak.  Patient advised to call back if her blood sugar is greater than 300 two times in a row, vomiting, rapid breathing or if she has any other symptoms/questions.  Patient verbalized understanding.      No on call provider for Dr. Severino.      Reason for Disposition   [1] Blood glucose 240 - 300 mg/dL (13.3 - 16.7 mmol/L) AND [2] uses insulin (e.g., insulin-dependent, all people with type 1 diabetes)    Additional Information   Negative: Unconscious or difficult to awaken   Negative: Acting confused (e.g., disoriented, slurred speech)   Negative: Very weak (e.g., can't stand)   Negative: Sounds like a life-threatening emergency to the triager   Negative: [1] Vomiting AND [2] signs of dehydration (e.g., very dry mouth, lightheaded, dark urine)   Negative: [1] Blood glucose > 240 mg/dL (13.3 mmol/L) AND [2] rapid breathing   Negative: Blood glucose > 500 mg/dL (27.8 mmol/L)   Negative: [1] New onset diabetes suspected (e.g., frequent urination, weak, weight loss) AND [2] vomiting or rapid breathing   Negative: Vomiting lasts > 4 hours   Negative: Patient sounds very sick or weak to the triager   Negative: [1] Blood glucose > 240 mg/dL (13.3 mmol/L) AND [2] urine ketones moderate-large (or more than 1+)   Negative: [1] Blood glucose > 240 mg/dL (13.3 mmol/L) AND [2] blood ketones > 1.4 mmol/L   Negative: [1] Blood glucose > 240 mg/dL (13.3 mmol/L) AND [2] vomiting AND [3] unable to check for ketones (in blood or urine)   Negative: Fever > 100.4 F (38.0 C)   Negative: Blood  glucose > 400 mg/dL (22.2 mmol/L)   Negative: [1] Blood glucose > 300 mg/dL (16.7 mmol/L) AND [2] two or more times in a row   Negative: [1] Caller has URGENT medication or insulin pump question AND [2] triager unable to answer question   Negative: [1] Symptoms of high blood sugar (e.g., frequent urination, weak, weight loss) AND [2] not able to test blood glucose   Negative: New onset diabetes suspected (e.g., frequent urination, weakness, weight loss)   Negative: [1] Caller has NON-URGENT medication or insulin pump question AND [2] triager unable to answer question   Negative: [1] Blood glucose > 300 mg/dL (16.7 mmol/L) AND [2] uses insulin (e.g., insulin-dependent, all people with type 1 diabetes)    Protocols used: DIABETES - HIGH BLOOD SUGAR-A-

## 2020-07-18 ENCOUNTER — HOSPITAL ENCOUNTER (OUTPATIENT)
Dept: RADIOLOGY | Facility: HOSPITAL | Age: 50
Discharge: HOME | End: 2020-07-18
Attending: INTERNAL MEDICINE
Payer: MEDICAID

## 2020-07-18 DIAGNOSIS — M25.511 CHRONIC RIGHT SHOULDER PAIN: ICD-10-CM

## 2020-07-18 DIAGNOSIS — G89.29 CHRONIC RIGHT SHOULDER PAIN: ICD-10-CM

## 2020-07-18 PROCEDURE — 73221 MRI SHOULDER WITHOUT CONTRAST RIGHT: ICD-10-PCS | Mod: 26,RT,, | Performed by: RADIOLOGY

## 2020-07-18 PROCEDURE — 73221 MRI JOINT UPR EXTREM W/O DYE: CPT | Mod: 26,RT,, | Performed by: RADIOLOGY

## 2020-07-18 PROCEDURE — 73221 MRI JOINT UPR EXTREM W/O DYE: CPT | Mod: TC,RT

## 2020-07-20 ENCOUNTER — CLINICAL SUPPORT (OUTPATIENT)
Dept: DIABETES | Facility: CLINIC | Age: 50
End: 2020-07-20
Payer: MEDICAID

## 2020-07-20 ENCOUNTER — TELEPHONE (OUTPATIENT)
Dept: RHEUMATOLOGY | Facility: CLINIC | Age: 50
End: 2020-07-20

## 2020-07-20 ENCOUNTER — OFFICE VISIT (OUTPATIENT)
Dept: CARDIOLOGY | Facility: CLINIC | Age: 50
End: 2020-07-20
Payer: MEDICAID

## 2020-07-20 ENCOUNTER — PATIENT MESSAGE (OUTPATIENT)
Dept: INTERNAL MEDICINE | Facility: CLINIC | Age: 50
End: 2020-07-20

## 2020-07-20 ENCOUNTER — TELEPHONE (OUTPATIENT)
Dept: ENDOCRINOLOGY | Facility: CLINIC | Age: 50
End: 2020-07-20

## 2020-07-20 ENCOUNTER — TELEPHONE (OUTPATIENT)
Dept: INTERNAL MEDICINE | Facility: CLINIC | Age: 50
End: 2020-07-20

## 2020-07-20 VITALS
WEIGHT: 207.88 LBS | BODY MASS INDEX: 46.76 KG/M2 | DIASTOLIC BLOOD PRESSURE: 82 MMHG | OXYGEN SATURATION: 98 % | HEART RATE: 96 BPM | SYSTOLIC BLOOD PRESSURE: 132 MMHG | HEIGHT: 56 IN

## 2020-07-20 DIAGNOSIS — E11.65 TYPE 2 DIABETES MELLITUS WITH HYPERGLYCEMIA, WITH LONG-TERM CURRENT USE OF INSULIN: Chronic | ICD-10-CM

## 2020-07-20 DIAGNOSIS — E11.65 UNCONTROLLED TYPE 2 DIABETES MELLITUS WITH HYPERGLYCEMIA: Primary | ICD-10-CM

## 2020-07-20 DIAGNOSIS — Z79.4 TYPE 2 DIABETES MELLITUS WITH HYPERGLYCEMIA, WITH LONG-TERM CURRENT USE OF INSULIN: Primary | Chronic | ICD-10-CM

## 2020-07-20 DIAGNOSIS — E66.9 NOCTURNAL HYPOXEMIA DUE TO OBESITY: Chronic | ICD-10-CM

## 2020-07-20 DIAGNOSIS — R94.31 ABNORMAL ECG: ICD-10-CM

## 2020-07-20 DIAGNOSIS — I15.2 HYPERTENSION COMPLICATING DIABETES: Primary | Chronic | ICD-10-CM

## 2020-07-20 DIAGNOSIS — R30.0 DYSURIA: Primary | ICD-10-CM

## 2020-07-20 DIAGNOSIS — E11.69 DYSLIPIDEMIA ASSOCIATED WITH TYPE 2 DIABETES MELLITUS: Chronic | ICD-10-CM

## 2020-07-20 DIAGNOSIS — G47.36 NOCTURNAL HYPOXEMIA DUE TO OBESITY: Chronic | ICD-10-CM

## 2020-07-20 DIAGNOSIS — Z79.4 ENCOUNTER FOR LONG-TERM (CURRENT) USE OF INSULIN: ICD-10-CM

## 2020-07-20 DIAGNOSIS — E11.59 HYPERTENSION COMPLICATING DIABETES: Primary | Chronic | ICD-10-CM

## 2020-07-20 DIAGNOSIS — E66.01 MORBID OBESITY WITH BMI OF 45.0-49.9, ADULT: Chronic | ICD-10-CM

## 2020-07-20 DIAGNOSIS — E78.1 HYPERTRIGLYCERIDEMIA: Chronic | ICD-10-CM

## 2020-07-20 DIAGNOSIS — E78.5 DYSLIPIDEMIA ASSOCIATED WITH TYPE 2 DIABETES MELLITUS: Chronic | ICD-10-CM

## 2020-07-20 DIAGNOSIS — Z79.4 TYPE 2 DIABETES MELLITUS WITH HYPERGLYCEMIA, WITH LONG-TERM CURRENT USE OF INSULIN: Chronic | ICD-10-CM

## 2020-07-20 DIAGNOSIS — E11.65 TYPE 2 DIABETES MELLITUS WITH HYPERGLYCEMIA, WITH LONG-TERM CURRENT USE OF INSULIN: Primary | Chronic | ICD-10-CM

## 2020-07-20 DIAGNOSIS — E66.2 OBESITY HYPOVENTILATION SYNDROME: ICD-10-CM

## 2020-07-20 PROCEDURE — G0108 DIAB MANAGE TRN  PER INDIV: HCPCS | Mod: PBBFAC | Performed by: DIETITIAN, REGISTERED

## 2020-07-20 PROCEDURE — 99999 PR PBB SHADOW E&M-EST. PATIENT-LVL III: CPT | Mod: PBBFAC,,, | Performed by: DIETITIAN, REGISTERED

## 2020-07-20 PROCEDURE — 99999 PR PBB SHADOW E&M-EST. PATIENT-LVL V: ICD-10-PCS | Mod: PBBFAC,,, | Performed by: NURSE PRACTITIONER

## 2020-07-20 PROCEDURE — 99214 OFFICE O/P EST MOD 30 MIN: CPT | Mod: S$PBB,,, | Performed by: NURSE PRACTITIONER

## 2020-07-20 PROCEDURE — 99214 PR OFFICE/OUTPT VISIT, EST, LEVL IV, 30-39 MIN: ICD-10-PCS | Mod: S$PBB,,, | Performed by: NURSE PRACTITIONER

## 2020-07-20 PROCEDURE — 99999 PR PBB SHADOW E&M-EST. PATIENT-LVL V: CPT | Mod: PBBFAC,,, | Performed by: NURSE PRACTITIONER

## 2020-07-20 PROCEDURE — 99215 OFFICE O/P EST HI 40 MIN: CPT | Mod: PBBFAC,27 | Performed by: NURSE PRACTITIONER

## 2020-07-20 PROCEDURE — 99999 PR PBB SHADOW E&M-EST. PATIENT-LVL III: ICD-10-PCS | Mod: PBBFAC,,, | Performed by: DIETITIAN, REGISTERED

## 2020-07-20 PROCEDURE — 99213 OFFICE O/P EST LOW 20 MIN: CPT | Mod: PBBFAC | Performed by: DIETITIAN, REGISTERED

## 2020-07-20 NOTE — PROGRESS NOTES
She is requesting U/A due to dysuria without hematuria, flank pain, or fever.  Orders Placed This Encounter   Procedures    Urinalysis

## 2020-07-20 NOTE — Clinical Note
Hi, Met w/ pt today for diabetes care assessment. BG consistently running 300-400range even after increasing Novolog ac 30units. No excess carb, sugar noted. Due to consistent hyperglycemia, instructed pt to slightly raise Tougeo 95 units daily and Novolog ac 35-40 units. Instructed pt to fu w/ pcp today to eval UTI symptoms and shoulder pain. Please advise if you agree w/ medication changes. Full notes attached. Thanks! Yara

## 2020-07-20 NOTE — TELEPHONE ENCOUNTER
Called and left a voicemail for patient to read message sent to her from Dr. Beach on the patient portal.

## 2020-07-20 NOTE — TELEPHONE ENCOUNTER
----- Message from Michaela Jimenez sent at 7/20/2020  9:22 AM CDT -----  Regarding: referral  .Type:  Patient Requesting Referral    Who Called: pt  Does the patient already have the specialty appointment scheduled?: no  If yes, what is the date of that appointment?:no  Referral to What Specialty: pain management  Reason for Referral:   Does the patient want the referral with a specific physician?:   [facility]   Patient Requesting a Response?: yes   Would the patient rather a call back or a response via [facility]? Call back   Best Call Back Number: [phone] ([place])     Additional Information:  pt stated referral was written

## 2020-07-20 NOTE — TELEPHONE ENCOUNTER
Spoke with patient and scheduled her for urinalysis ordered by Dr. Beach for dysuria. She verbalized understanding.

## 2020-07-20 NOTE — LETTER
July 20, 2020        Malka Severino MD  [address]  [place] [address]             [facility]  [address]  [place] [address]  Phone: [phone]  Fax: [phone]   Patient: Yolanda Betts   MR Number: [id number]   YOB: 1970   Date of Visit: 7/20/2020       Dear Dr. Severino:    Thank you for referring Yolanda Betts to me for evaluation. Below are the relevant portions of my assessment and plan of care.    If you have questions, please do not hesitate to call me. I look forward to following Yolanda along with you.    Sincerely,      Yara Sanchez RD, CDE           CC  DOMINIK Beach MD

## 2020-07-20 NOTE — PROGRESS NOTES
Yolanda Tate.    Your A1c shows that your diabetes is uncontrolled.  It is important that you keep your upcoming appointments with your endocrinologist (Dr. Severino) and your appointment with the diabetes educator.    You are also due for a diabetic eye exam.  I am asking my staff to contact you and help you schedule that appointment.    I also put in an order for you to be able to participate in [facility] program. Call the [facility] Help Desk at [phone] to get started.    Take care and be well.    Sincerely,    DOMINIK Beach MD

## 2020-07-20 NOTE — PROGRESS NOTES
Subjective:   Patient ID:  Yolanda Betts is a 50 y.o. female who presents for evaluation of No chief complaint on file.      HPI     Yolanda Betts is a 49 year old female who presents to clinic for BP follow up. Her current medical conditions include HTN, HLP, DM, GERD, Morbid Obesity, ALEN. She returns today and states she is doing ok. She reports issues with dizzy spells and headaches recently with her BP going up and down. BP well controlled today in office.     Denies chest pain or anginal equivalents. No shortness of breath, COATS or palpitations. Denies orthopnea, PND or abdominal bloating. Reports regular walking without any issues lately. NO leg swelling or claudications. No recent falls, syncope or near syncopal events. Reports compliance with medications and dietary restrictions. NO CNS complaints to suggest TIA or CVA today. No signs of abnormal bleeding on ASA daily.     Patient's recent labs revealed K+ 5.3 and she has had issues with food choices since that time and BP swings.     7/20/2020 Update:  Denies any new cardiac complaints today in office. She has some worsening blood sugar issues recently. BP well controlled today in office and per digital HTN log. Blood sugar has been as high as 400 mg/dL. Insulin has been adjusted per PCP recently with attempts to improve blood sugar control.       Recent Readings 7/16/2020 7/16/2020 7/15/2020 7/15/2020 7/14/2020   SBP (mmHg) 137  (External Source: [employer]) 146  (External Source: [employer]) 141 134 123   DBP (mmHg) 93  (External Source: [employer]) 92  (External Source: [employer]) 88 88 80   Pulse 96  (External Source: [employer]) 98  (External Source: [employer]) 103 97 95     Recent Readings 7/2/2020 6/21/2020 6/11/2020   SBP (mmHg) 144  (External Source: [employer]) 117 109  (External Source: [employer])   DBP (mmHg) 91  (External Source: [employer]) 80 76  (External Source: [employer])    Pulse 98  (External Source: [employer]) 87 97  (External Source: [employer])       Past Medical History:   Diagnosis Date    Abnormal Pap smear of cervix     HPV genital warts    Anemia     Anxiety     Arthritis     Asthma 10/11/2016    Bipolar 1 disorder     Diabetes mellitus, type 2     Dyslipidemia associated with type 2 diabetes mellitus 2019    Genital warts     GERD (gastroesophageal reflux disease)     Herpes simplex virus (HSV) infection     Hypertension     Hypertension complicating diabetes 2019    Migraine with aura and without status migrainosus, not intractable 3/21/2016    Mild persistent asthma without complication 10/11/2016    Morbid obesity with body mass index (BMI) of 45.0 to 49.9 in adult 8/3/2017    Obstructive sleep apnea     ALEN (obstructive sleep apnea)     2L per N/C q HS    Schizoaffective disorder, bipolar type 2019    Seasonal allergic rhinitis due to pollen 2019    Type 2 diabetes mellitus with hyperglycemia, with long-term current use of insulin 2017    Type 2 diabetes mellitus with hyperglycemia, without long-term current use of insulin 2017       Past Surgical History:   Procedure Laterality Date    abcess removed right back      BELT ABDOMINOPLASTY  1996    BREAST SURGERY Bilateral     Reduction     SECTION      X 2    COLONOSCOPY      COLONOSCOPY N/A 2018    Procedure: colonoscopy for iron deficiency anemia;  Surgeon: Frankie Sanchez MD;  Location: [facility];  Service: Endoscopy;  Laterality: N/A;    COLONOSCOPY N/A 2018    Procedure: COLONOSCOPY;  Surgeon: Frankie Sanchez MD;  Location: [facility];  Service: Endoscopy;  Laterality: N/A;    HYSTERECTOMY      w/ BSO; hypermenorrhea    MOUTH SURGERY      OOPHORECTOMY      hyst/bso, hypermenorrhea    ROBOT-ASSISTED CHOLECYSTECTOMY USING DA DARI XI N/A 1/3/2020    Procedure: XI ROBOTIC CHOLECYSTECTOMY;  Surgeon: Damir  "MD Americo;  [facility];  Service: General;  Laterality: N/A;    TOTAL REDUCTION MAMMOPLASTY      TUBAL LIGATION         Social History     Tobacco Use    Smoking status: Never Smoker    Smokeless tobacco: Never Used   Substance Use Topics    Alcohol use: Not Currently     Alcohol/week: 0.0 standard drinks     Comment: socially  No alcohol 72h prior to sx    Drug use: No       Family History   Problem Relation Age of Onset    Diabetes Mother     Hyperlipidemia Mother     Hypertension Mother     Asthma Mother     COPD Mother     Glaucoma Mother     Thyroid disease Mother     Anesthesia problems Mother         "almost had a cardiac arrest" , blood clots    Hypertension Father     Hyperlipidemia Father     Cancer Father         Brain, lung, liver, kidney    Heart disease Maternal Grandmother     Hyperlipidemia Maternal Grandmother     Hypertension Maternal Grandmother     Cataracts Maternal Grandmother     Diabetes Maternal Grandmother     Heart disease Maternal Grandfather     Hyperlipidemia Maternal Grandfather     Hypertension Maternal Grandfather     Glaucoma Maternal Grandfather     Cancer Maternal Grandfather     Cataracts Maternal Grandfather     Macular degeneration Maternal Grandfather     Diabetes Maternal Grandfather     Heart disease Paternal Grandmother     Hyperlipidemia Paternal Grandmother     Hypertension Paternal Grandmother     Cataracts Paternal Grandmother     Heart disease Paternal Grandfather     Hyperlipidemia Paternal Grandfather     Hypertension Paternal Grandfather     Cataracts Paternal Grandfather     Breast cancer Maternal Cousin     Breast cancer Maternal Cousin     Breast cancer Maternal Cousin     Breast cancer Maternal Cousin      Wt Readings from Last 3 Encounters:   07/14/20 94.1 kg (207 lb 7.3 oz)   07/10/20 94.2 kg (207 lb 10.8 oz)   07/10/20 94.2 kg (207 lb 10.8 oz)     Temp Readings from Last 3 Encounters:   07/07/20 (!) 69.4 °F " "(20.8 °C) (Tympanic)   06/26/20 98.3 °F (36.8 °C) (Oral)   06/22/20 98.7 °F (37.1 °C) (Temporal)     BP Readings from Last 3 Encounters:   07/14/20 (!) 131/91   07/10/20 (!) 141/90   07/09/20 120/72     Pulse Readings from Last 3 Encounters:   07/14/20 95   07/10/20 90   07/09/20 104     Current Outpatient Medications on File Prior to Visit   Medication Sig Dispense Refill    albuterol (PROVENTIL) 2.5 mg /3 mL (0.083 %) nebulizer solution Take 3 mLs (2.5 mg total) by nebulization every 4 (four) hours. 180 mL 11    ALPRAZolam (XANAX) 1 MG tablet Take 1 tablet by mouth twice daily 60 tablet 0    amLODIPine (NORVASC) 10 MG tablet Take 1 tablet (10 mg total) by mouth once daily. 30 tablet 3    ammonium lactate 12 % Crea Apply 1 Application topically 2 (two) times daily. 140 g 3    ARIPiprazole (ABILIFY) 10 MG Tab Take 1 tablet by mouth at bedtime 30 tablet 2    ARIPiprazole (ABILIFY) 10 MG Tab Take 1 tablet (10 mg total) by mouth once daily. (Patient not taking: Reported on 7/14/2020) 30 tablet 2    aspirin (ECOTRIN) 81 MG EC tablet Take 81 mg by mouth once daily.      atorvastatin (LIPITOR) 20 MG tablet Take 1 tablet (20 mg total) by mouth every evening. 30 tablet 0    BD INSULIN SYRINGE ULTRA-FINE 1/2 mL 30 gauge x 1/2" Syrg   0    benztropine (COGENTIN) 1 MG tablet Take 1 tablet (1 mg total) by mouth every evening. 30 tablet 1    blood sugar diagnostic Strp Check blood glucose 4 times daily as directed and as needed (dispense insurance preferred brand or patient choice) 200 each 5    blood-glucose meter Misc Use to check blood sugars, give meter based on insurance specification 1 each 1    cyclobenzaprine (FLEXERIL) 10 MG tablet Take 1 tablet (10 mg total) by mouth daily as needed (for back muscle spasms). 30 tablet 2    cyclosporine 0.05 % Drop Place 1 drop into both eyes 2 (two) times daily. 5.5 mL 11    DULoxetine (CYMBALTA) 60 MG capsule Take 1 capsule (60 mg total) by mouth 2 (two) times daily. 60 " capsule 1    DULoxetine (CYMBALTA) 60 MG capsule Take 1 capsule by mouth twice daily 60 capsule 0    ergocalciferol (ERGOCALCIFEROL) 50,000 unit Cap Take 1 capsule (50,000 Units total) by mouth every 14 (fourteen) days. 4 capsule 5    fish oil-omega-3 fatty acids 300-1,000 mg capsule Take 1 capsule by mouth once daily.      furosemide (LASIX) 20 MG tablet Take 1 tablet (20 mg total) by mouth once daily. 30 tablet 11    gabapentin (NEURONTIN) 300 MG capsule Take 2 capsules (600 mg total) by mouth 3 (three) times daily. 90 capsule 3    hydrALAZINE (APRESOLINE) 10 MG tablet Take 1 tablet (10 mg total) by mouth every 12 (twelve) hours. 60 tablet 11    insulin aspart U-100 (NOVOLOG FLEXPEN U-100 INSULIN) 100 unit/mL (3 mL) InPn pen Inject before meals three times a day up to 30 units daily 15 mL 3    lancets 28 gauge Misc use as directed 3 times daily 100 each 11    linaCLOtide (LINZESS) 145 mcg Cap capsule Take 1 capsule (145 mcg total) by mouth daily as needed (for constipation). 30 capsule 11    liraglutide 0.6 mg/0.1 mL, 18 mg/3 mL, subq PNIJ (VICTOZA 2-MILENA) 0.6 mg/0.1 mL (18 mg/3 mL) PnIj Use 1.8mg under the skin daily 9 mL 3    lurasidone (LATUDA) 120 mg Tab Take 1 tablet by mouth everyday at 5 pm with food 30 tablet 0    magnesium oxide (MAG-OX) 400 mg (241.3 mg magnesium) tablet Take 1 tablet (400 mg total) by mouth once daily. 30 tablet 3    metFORMIN (GLUCOPHAGE) 1000 MG tablet TAKE 1 TABLET (1,000 MG TOTAL) BY MOUTH 2 (TWO) TIMES DAILY. 180 tablet 0    metoprolol succinate (TOPROL-XL) 100 MG 24 hr tablet Take 2 tablets (200 mg total) by mouth every evening. 180 tablet 3    mirtazapine (REMERON) 45 MG tablet Take 1 tablet by mouth every evevning 30 tablet 0    mometasone (ASMANEX TWISTHALER) 220 mcg/ actuation (120) AePB Inhale 2 puffs into the lungs 2 (two) times daily. Controller 1 each 11    montelukast (SINGULAIR) 10 mg tablet Take 1 tablet (10 mg total) by mouth every evening. 30 tablet 11  "   MULTIVIT,CALC,MINS/IRON/FOLIC (DAILY MULTIPLE FOR WOMEN ORAL) Take 1 tablet by mouth once daily.      pen needle, diabetic (BD ULTRA-FINE MINI PEN NEEDLE) 31 gauge x 3/16" Ndle Use 5 a day 200 each 3    spironolactone (ALDACTONE) 25 MG tablet Take 1 tablet (25 mg total) by mouth once daily. 30 tablet 6    temazepam (RESTORIL) 15 mg Cap Take 1 capsule by mouth at bedtime 30 capsule 1    topiramate (TOPAMAX) 50 MG tablet Take 1 tablet (50 mg total) by mouth 2 (two) times daily. 60 tablet 11    TOUJEO MAX U-300 SOLOSTAR 300 unit/mL (3 mL) InPn Inject 90 units once daily at bedtime 15 mL 3    valACYclovir (VALTREX) 1000 MG tablet Take 1 tablet (1,000 mg total) by mouth once daily. 30 tablet 11    valsartan (DIOVAN) 320 MG tablet Take 1 tablet (320 mg total) by mouth once daily. 90 tablet 3     No current facility-administered medications on file prior to visit.        Review of Systems   Constitution: Positive for malaise/fatigue.   HENT: Negative for hearing loss and hoarse voice.    Eyes: Negative for blurred vision and visual disturbance.   Cardiovascular: Negative for chest pain, claudication, dyspnea on exertion, irregular heartbeat, leg swelling, near-syncope, orthopnea, palpitations, paroxysmal nocturnal dyspnea and syncope.   Respiratory: Negative for cough, hemoptysis, shortness of breath, sleep disturbances due to breathing, snoring and wheezing.    Endocrine: Negative for cold intolerance and heat intolerance.   Hematologic/Lymphatic: Does not bruise/bleed easily.   Skin: Negative for color change, dry skin and nail changes.   Musculoskeletal: Positive for arthritis and back pain. Negative for joint pain and myalgias.   Gastrointestinal: Negative for bloating, abdominal pain, constipation, nausea and vomiting.   Genitourinary: Negative for dysuria, flank pain, hematuria and hesitancy.   Neurological: Positive for dizziness and weakness. Negative for headaches, light-headedness, loss of balance, " "numbness and paresthesias.   Psychiatric/Behavioral: Negative for altered mental status.   Allergic/Immunologic: Negative for environmental allergies.     /82   Pulse 96   Ht 4' 8" (1.422 m)   Wt 94.3 kg (207 lb 14.3 oz)   SpO2 98%   BMI 46.61 kg/m²     Objective:   Physical Exam   Constitutional: She is oriented to person, place, and time. She appears well-developed and well-nourished. No distress.   HENT:   Head: Normocephalic and atraumatic.   Eyes: Pupils are equal, round, and reactive to light.   Neck: Normal range of motion and full passive range of motion without pain. Neck supple. No JVD present.   Cardiovascular: Normal rate, regular rhythm, S1 normal, S2 normal and intact distal pulses.  Occasional extrasystoles are present. PMI is not displaced. Exam reveals no distant heart sounds.   No murmur heard.  Pulses:       Radial pulses are 2+ on the right side and 2+ on the left side.        Dorsalis pedis pulses are 2+ on the right side and 2+ on the left side.   CHEST PAIN FREE  BP WELL CONTROLLED TODAY   Pulmonary/Chest: Effort normal and breath sounds normal. No accessory muscle usage. No respiratory distress. She has no decreased breath sounds. She has no wheezes. She has no rales.   +NOCTURNAL OXYGEN    Abdominal: Soft. Bowel sounds are normal. She exhibits no distension. There is no abdominal tenderness.   +obese abdomen   Musculoskeletal: Normal range of motion.         General: No edema.      Right ankle: She exhibits no swelling.      Left ankle: She exhibits no swelling.   Neurological: She is alert and oriented to person, place, and time.   Skin: Skin is warm and dry. She is not diaphoretic. No cyanosis. Nails show no clubbing.   Psychiatric: She has a normal mood and affect. Her speech is normal and behavior is normal. Judgment and thought content normal. Cognition and memory are normal.   Nursing note and vitals reviewed.      Lab Results   Component Value Date    CHOL 115 (L) " 07/07/2020    CHOL 126 02/18/2020    CHOL 142 04/25/2019     Lab Results   Component Value Date    HDL 31 (L) 07/07/2020    HDL 46 02/18/2020    HDL 36 (L) 04/25/2019     Lab Results   Component Value Date    LDLCALC Invalid, Trig>400.0 07/07/2020    LDLCALC 49.6 (L) 02/18/2020    LDLCALC 40.4 (L) 04/25/2019     Lab Results   Component Value Date    TRIG 547 (H) 07/07/2020    TRIG 152 (H) 02/18/2020    TRIG 328 (H) 04/25/2019     Lab Results   Component Value Date    CHOLHDL 27.0 07/07/2020    CHOLHDL 36.5 02/18/2020    CHOLHDL 25.4 04/25/2019       Chemistry        Component Value Date/Time     (L) 06/26/2020 1700    K 5.1 06/26/2020 1700    CL 99 06/26/2020 1700    CO2 23 06/26/2020 1700    BUN 11 06/26/2020 1700    CREATININE 0.8 06/26/2020 1700     (H) 06/26/2020 1700        Component Value Date/Time    CALCIUM 9.9 06/26/2020 1700    ALKPHOS 122 06/26/2020 1700    AST 59 (H) 06/26/2020 1700    ALT 56 (H) 06/26/2020 1700    BILITOT 0.2 06/26/2020 1700    ESTGFRAFRICA >60 06/26/2020 1700    EGFRNONAA >60 06/26/2020 1700          Lab Results   Component Value Date    TSH 1.855 12/22/2017     Lab Results   Component Value Date    INR 1.0 06/26/2020    INR 0.9 11/23/2016     Lab Results   Component Value Date    WBC 9.40 06/26/2020    HGB 12.2 06/26/2020    HCT 40.2 06/26/2020    MCV 92 06/26/2020     06/26/2020      Assessment:      1. Hypertension complicating diabetes    2. Hypertriglyceridemia    3. Dyslipidemia associated with type 2 diabetes mellitus    4. Abnormal ECG    5. Morbid obesity with BMI of 45.0-49.9, adult    6. Type 2 diabetes mellitus with hyperglycemia, with long-term current use of insulin    7. Nocturnal hypoxemia due to obesity - WITHOUT ALEN    8. Obesity hypoventilation syndrome      Patient presents to clinic for routine follow up. No new cardiac complaints today in office. She does endorse issues with enlarged tonsils at night.   She has recently had issues with elevated  Blood glucose readings in 300-400s, needs adjustment to her regimen to gain adequate control.   Plan:     Referral to ENT for evaluation of large tonsils with snoring and nocturnal oxygen requirement  May benefit from new Diabetes regimen to include Ozempic weekly injections and possibly Jardiance, ultimate decision per Endocrine  Continue same CV meds for now  Dash diet, 2gm sodium restriction  1500 ml fluid restriction  Walking as tolerated  Continue Digital HTN program  RTC in 6 months or sooner if needed.  Labs per PCP    Nicole May, FNP-C [facility]

## 2020-07-20 NOTE — PROGRESS NOTES
Diabetes Education  Author: Yara Sanchez RD, CDE  Date: 7/20/2020    Diabetes Care Management Summary  Diabetes Education Record Assessment/Progress: Initial(assessment 7/20/20)  Current Diabetes Risk Level: High     Digital Diabetes:   Jany Up, PharmD  Bipin Dyson,     Last A1c:   Lab Results   Component Value Date    HGBA1C 11.6 (H) 07/07/2020     Last Visit with Diabetes Educator: Last Education Visit: Not Found  Last OPCM Referral: : Not Found   Enrolled in OPCM: No     Diabetes Type  Diabetes Type : Type II    Diabetes History  Diabetes Diagnosis: >10 years(>25 yrs)  Current Treatment: Diet, Exercise, Insulin, Oral Medication, Injectable(Metformin 1000mg 1tab twice daily; victoza 1.8 mg daily; toujeo max 90units daily; novolog ac 30units bfst, 30units lunch, 30 units supper)  Reviewed Problem List with Patient: Yes    Health Maintenance was reviewed today with patient. Discussed with patient importance of routine eye exams, foot exams/foot care, blood work (i.e.: A1c, microalbumin, and lipid), dental visits, yearly flu vaccine, and pneumonia vaccine as indicated by PCP. Patient verbalized understanding.     Health Maintenance Topics with due status: Not Due       Topic Last Completion Date    TETANUS VACCINE 12/21/2017    Colonoscopy 02/28/2018    Influenza Vaccine 10/01/2018    Lipid Panel 07/07/2020    Hemoglobin A1c 07/07/2020    Mammogram 07/10/2020    Foot Exam 07/14/2020     Health Maintenance Due   Topic Date Due    Eye Exam  05/22/2020       Nutrition  Meal Planning: (Intake ~7501-6021 cals/d. Pt limiting carb 30-45grams/meal. No excess fat, sodium.)  Meal Plan 24 Hour Recall - Breakfast: 2 eggs, toast OR cream wheat  Meal Plan 24 Hour Recall - Lunch: soup (turkey neck bones, mushrooms, cabbage - cooked well) OR sandwich (chix/lean pork on whole grain)  Meal Plan 24 Hour Recall - Dinner: leftovers  Meal Plan 24 Hour Recall - Snack: fruit; marco: water    Monitoring   Self Monitoring :  Digital records reviewed - noted BG running one reading at 105, most 279-400 range. Pt reports GI issues past couple months (diarrhea/constipation) and possible UTI symptoms past couple weeks. She is fu w/ GI dept for eval.  Blood Glucose Logs: Yes  In the last month, how often have you had a low blood sugar reaction?: never  Can you tell when your blood sugar is too high?: yes(polyuria)    Exercise   Exercise Type: (None recent due shoulder pain. Prior waking around apt complex ~20min 3d/wk)    Current Diabetes Treatment   Current Treatment: Diet, Exercise, Insulin, Oral Medication, Injectable(Metformin 1000mg 1tab twice daily; victoza 1.8 mg daily; toujeo max 90units daily; novolog ac 30units bfst, 30units lunch, 30 units supper)    Social History  Preferred Learning Method: Face to Face  Primary Support: Self  Smoking Status: Never a Smoker  Alcohol Use: Rarely     Barriers to Change  Barriers to Change: None  Learning Challenges : None    Readiness to Learn   Readiness to Learn : Eager    Cultural Influences  Cultural Influences: No    Diabetes Education Assessment/Progress  Diabetes Disease Process (diabetes disease process and treatment options): Discussion, Individual Session, Demonstrates Understanding/Competency(verbalizes/demonstrates)  Nutrition (Incorporating nutritional management into one's lifestyle): Discussion, Individual Session, Demonstrates Understanding/Competency (verbalizes/demonstrates)  Physical Activity (incorporating physical activity into one's lifestyle): Discussion, Individual Session, Demonstrates Understanding/Competency (verbalizes/demonstrates)  Medications (states correct name, dose, onset, peak, duration, side effects & timing of meds): Discussion, Individual Session, Demonstrates Understanding/Competency(verbalizes/demonstrates)  Monitoring (monitoring blood glucose/other parameters & using results): Discussion, Needs Review, Instructed, Individual Session, Demonstrates  Understanding/Competency (verbalizes/demonstrates)  Acute Complications (preventing, detecting, and treating acute complications): Discussion, Individual Session, Demonstrates Understanding/Competency (verbalizes/demonstrates)  Chronic Complications (preventing, detecting, and treating chronic complications): Discussion, Individual Session, Demonstrates Understanding/Competency (verbalizes/demonstrates)  Clinical (diabetes, other pertinent medical history, and relevant comorbidities reviewed during visit): Discussion, Individual Session, Demonstrates Understanding/Competency (verbalizes/demonstrates)  Cognitive (knowledge of self-management skills, functional health literacy): Discussion, Individual Session, Demonstrates Understanding/Competency (verbalizes/demonstrates)  Psychosocial (emotional response to diabetes): Not Covered/Deferred  Diabetes Distress and Support Systems: Not Covered/Deferred  Behavioral (readiness for change, lifestyle practices, self-care behaviors): Discussion, Individual Session, Demonstrates Understanding/Competency (verbalizes/demonstrates)    Goals  Patient has selected/evaluated goals during today's session: Yes, evaluated  Healthy Eating: Set(contiue to use meal plan - carb portions/spacing, rec seasonings, avoid fast foods)  Met Percentage : 75%  Start Date: 07/20/20  Target Date: 08/18/20  Physical Activity: Set(150min/wk - chair exercises or exercise bands)  Met Percentage : 0%  Start Date: 07/20/20  Target Date: 08/18/20  Monitoring: Set(continue test BG 4x/d; consider Dexcom CGMS)  Met Percentage : 100%  Start Date: 07/20/20  Target Date: 08/18/20         Diabetes Care Plan/Intervention  Education Plan/Intervention: Individual Follow-Up DSMT([facility] w/ Dr. Severino, digital diabetes, pcp for medical mgmt.) Due to consistent hyperglycemia still after increasing Novolog ac 30units, instructed pt to slightly raise Tougeo 95 units daily and Novolog ac 35-40 units. Will message Dr. Severino and  digital team to eval medication changes recommended today. Instructed pt to fu w/ pcp today to eval UTI symptoms and shoulder pain. Reviewed benefits of probiotic (1tab daily) and fish oil supplement (1000mg 1 capsule twice daily) on GI health. RTC ~3-4wks, prn or as referred.      Diabetes Meal Plan  Restrictions: Low Fat, Low Sodium, Restricted Carbohydrate  Calories: 1400  Carbohydrate Per Meal: 30-45g  Carbohydrate Per Snack : 7-15g    Today's Self-Management Care Plan was developed with the patient's input and is based on barriers identified during today's assessment.    The long and short-term goals in the care plan were written with the patient/caregiver's input. The patient has agreed to work toward these goals to improve her overall diabetes control.      The patient received a copy of today's self-management plan and verbalized understanding of the care plan, goals, and all of today's instructions.      The patient was encouraged to communicate with her physician and care team regarding her condition(s) and treatment.  I provided the patient with my contact information today and encouraged her to contact me via phone or patient portal as needed.     Education Units of Time   Time Spent: 60 min

## 2020-07-20 NOTE — TELEPHONE ENCOUNTER
Spoke with pt sending Dr. Severino message her numbers are in Digital diabetes       ----- Message from Negra Conway sent at 7/20/2020 10:32 AM CDT -----  Regarding: blood sugar high  Pt needing a call regarding the pt blood sugar is high and she is needing assistance from the dr on what she should do. Please assist     Pt contact #[phone]

## 2020-07-20 NOTE — TELEPHONE ENCOUNTER
Spoke with patient . Per Dr. Gibbs note of 7-1-20 it states to concider pain management referral by primary care. Patient to call her primary care.

## 2020-07-20 NOTE — TELEPHONE ENCOUNTER
ACTION NEEDED:  Please read Patient Portal Message (below). Then please contact her to verify her receipt and understanding of the message and help her schedule diabetic eye exam. Thanks.    Orders Placed This Encounter   Procedures    Ambulatory referral/consult to Optometry      Yolanda Tate.    Your A1c shows that your diabetes is uncontrolled.  It is important that you keep your upcoming appointments with your endocrinologist (Dr. Severino) and your appointment with the diabetes educator.    You are also due for a diabetic eye exam.  I am asking my staff to contact you and help you schedule that appointment.    I also put in an order for you to be able to participate in [facility] Diabetes Digital Medicine program. Call the [facility] Help Desk at [phone] to get started.    Take care and be well.    Sincerely,    DOMINIK Beach MD

## 2020-07-21 ENCOUNTER — TELEPHONE (OUTPATIENT)
Dept: ENDOCRINOLOGY | Facility: CLINIC | Age: 50
End: 2020-07-21

## 2020-07-21 ENCOUNTER — TELEPHONE (OUTPATIENT)
Dept: INTERNAL MEDICINE | Facility: CLINIC | Age: 50
End: 2020-07-21

## 2020-07-21 NOTE — TELEPHONE ENCOUNTER
Calling pt to set up appointment next week with Dr. Severino left message to return call so I can schedule her       ----- Message from Malka Severino MD sent at 7/21/2020  1:24 PM CDT -----  See if you can take 1 of the new patient's that are scheduled with me off of my schedule that is mis- scheduled and can try to put her in next week and we can discuss going on more concentrated insulin and she must do a food log as well as indicated when she gives her insulin and how much she is giving so I can review  ----- Message -----  From: Madison Sun LPN  Sent: 7/21/2020  11:17 AM CDT  To: Malka Severino MD    Pt did urine yesterday no infection but he did not draw labs what should she try next?

## 2020-07-21 NOTE — TELEPHONE ENCOUNTER
Calling to schedule pt for Dr. Severino next week        ----- Message from Olesya Juárez sent at 7/21/2020  2:50 PM CDT -----  Regarding: missed call  Type:  Patient Returning Call    Who Called:pt  Who Left Message for Patient:staff  Does the patient know what this is regarding?:n/a  Would the patient rather a call back or a response via [facility]? Ca;; [facility] Call Back Number:[phone]  Additional Information: Please call back

## 2020-07-21 NOTE — TELEPHONE ENCOUNTER
Spoke with patient and she said she spoke with Dr. Severino's nurse and they told her to get an appointment with Dr. Beach because she must have an infection since her blood sugars have been running 300's to 400's. She denies any other symptoms. I informed her that I would send a message to Dr. Beach for her and see what he says. Patient verbalized understanding.

## 2020-07-21 NOTE — TELEPHONE ENCOUNTER
----- Message from Josselyn Stringer sent at 7/21/2020  1:47 PM CDT -----  Regarding: appt  Contact: patient  Patient needs to talk to the nurse about an appt she needs, would not be more specific, please call her back at [phone]

## 2020-07-22 ENCOUNTER — TELEPHONE (OUTPATIENT)
Dept: INTERNAL MEDICINE | Facility: CLINIC | Age: 50
End: 2020-07-22

## 2020-07-23 DIAGNOSIS — E11.69 DYSLIPIDEMIA ASSOCIATED WITH TYPE 2 DIABETES MELLITUS: ICD-10-CM

## 2020-07-23 DIAGNOSIS — I15.2 HYPERTENSION COMPLICATING DIABETES: Chronic | ICD-10-CM

## 2020-07-23 DIAGNOSIS — E11.59 HYPERTENSION COMPLICATING DIABETES: Chronic | ICD-10-CM

## 2020-07-23 DIAGNOSIS — E78.5 DYSLIPIDEMIA ASSOCIATED WITH TYPE 2 DIABETES MELLITUS: ICD-10-CM

## 2020-07-24 RX ORDER — AMLODIPINE BESYLATE 10 MG/1
10 TABLET ORAL NIGHTLY
Qty: 30 TABLET | Refills: 11 | Status: SHIPPED | OUTPATIENT
Start: 2020-07-24 | End: 2020-09-16 | Stop reason: SDUPTHER

## 2020-07-26 ENCOUNTER — NURSE TRIAGE (OUTPATIENT)
Dept: ADMINISTRATIVE | Facility: CLINIC | Age: 50
End: 2020-07-26

## 2020-07-26 RX ORDER — ATORVASTATIN CALCIUM 20 MG/1
20 TABLET, FILM COATED ORAL NIGHTLY
Qty: 30 TABLET | Refills: 1 | Status: SHIPPED | OUTPATIENT
Start: 2020-07-26 | End: 2020-10-06 | Stop reason: SDUPTHER

## 2020-07-26 NOTE — TELEPHONE ENCOUNTER
"Pt states "I don't feel good, dealing with high blood glucose levels". Pt reports nausea, unable to take medications due to nausea. Denies vomiting. BG has been ranging between 200-400, this morning BG was 255. Pt took insulin but unable take oral pills do to nausea. Current BG is 282. Unable to drink water due to nausea, pt admits feeling so weak and muscle are so tired. Pt states BG has been uncontrolled x 1 month.     Pt agreed to to go to [facility] for eval.     Reason for Disposition   Patient sounds very sick or weak to the triager    Additional Information   Negative: Unconscious or difficult to awaken   Negative: Acting confused (e.g., disoriented, slurred speech)   Negative: Very weak (e.g., can't stand)   Negative: Sounds like a life-threatening emergency to the triager   Negative: [1] Vomiting AND [2] signs of dehydration (e.g., very dry mouth, lightheaded, dark urine)   Negative: [1] Blood glucose > 240 mg/dL (13.3 mmol/L) AND [2] rapid breathing   Negative: Blood glucose > 500 mg/dL (27.8 mmol/L)   Negative: [1] Blood glucose > 240 mg/dL (13.3 mmol/L) AND [2] urine ketones moderate-large (or more than 1+)   Negative: [1] Blood glucose > 240 mg/dL (13.3 mmol/L) AND [2] blood ketones > 1.4 mmol/L   Negative: [1] Blood glucose > 240 mg/dL (13.3 mmol/L) AND [2] vomiting AND [3] unable to check for ketones (in blood or urine)   Negative: [1] New onset diabetes suspected (e.g., frequent urination, weak, weight loss) AND [2] vomiting or rapid breathing   Negative: Vomiting lasts > 4 hours    Protocols used: DIABETES - HIGH BLOOD SUGAR-A-AH      "

## 2020-07-26 NOTE — TELEPHONE ENCOUNTER
REFILL APPROVED    Medications Ordered This Encounter   Medications    atorvastatin (LIPITOR) 20 MG tablet     Sig: Take 1 tablet (20 mg total) by mouth every evening.     Dispense:  30 tablet     Refill:  1      Future Appointments   Date Time Provider Department Center   7/30/2020 10:30 AM Malka Severino MD [facility]

## 2020-07-27 ENCOUNTER — TELEPHONE (OUTPATIENT)
Dept: INTERNAL MEDICINE | Facility: CLINIC | Age: 50
End: 2020-07-27

## 2020-07-27 ENCOUNTER — PATIENT OUTREACH (OUTPATIENT)
Dept: OTHER | Facility: OTHER | Age: 50
End: 2020-07-27

## 2020-07-27 NOTE — TELEPHONE ENCOUNTER
----- Message from Rashmi Albrecht sent at 7/27/2020  8:57 AM CDT -----  Please call pt @ [phone] regarding urgent care visit yesterday, pt need to discuss with nurse, pt states she is still feeling bad, pt need to know what can she do.

## 2020-07-27 NOTE — TELEPHONE ENCOUNTER
Spoke with pt regarding message she left informed pt that  recommends her to go to Urgent Care or E.R..//LB

## 2020-07-27 NOTE — TELEPHONE ENCOUNTER
Spoke with pt and she stated that she have been in bed all weekend and she can't eat or get out of bed .Pt states that her blood sugar level are high. Pt stated she went to Urgent care yesterday.  recommand E.R. OR Urgent Care.Pt stated that she understood.//LB

## 2020-07-27 NOTE — TELEPHONE ENCOUNTER
ACTION NEEDED: Please schedule her with first available JONATHAN appointment here or instruct her to go to urgent care for further evaluation and treatment. Thanks.

## 2020-07-27 NOTE — PROGRESS NOTES
Digital Medicine: Health  Follow-Up    The history is provided by the patient.               Care Team received low BG alert.        Additional Follow-up details: Called to f/u with patient for low BG readings on 7/9/20 and 7/23/20. Patient reports that the reading of 54 on 7/9/20 was a reading she got while running the test solution. Patient also reports that the reading of 4 on 7/23/20 was a mistake. I deleted both of these readings from her chart. Patient contacted [facility] on call on 7/26/20 because she was feeling the effects of elevated BG numbers. Patient reports dizziness, weakness, and headache that started around 2 am Sunday (7/26/20) morning. Patient was unable to take any of her BG medications on 7/26/20. Reached out to Loretta Carmichael to discuss situation. Will f/u with patient in 1-2 weeks.     Update: Conferred with Loretta Carmichael about situation. [facility] has been in contact with Dr. Severino about medication regimen. Patient has f/u appointment on 7/30/20. Will wait to see what plan of action is moving forward. Mentioned to patient to make sure she stays hydrated and if she doesn't feel well to make sure she is checking her BG numbers. Will f/u in 1-2 weeks to check in.        Diet-Not assessed      Additional diet details:    Physical Activity-Not assessed    Medication Adherence-Medication adherence was asssessed.    Patient reported missing medication: once a week.        Patient reports that on 7/26/20 she awoke around 2 am with a headache, nausea, and weakness. Patient was unable to take any of her medications that day due to ill feelings. Patient reached out to [facility] on call that day as well. Notified Loretta Carmichael.     Intervention/Plan      Topic    Eye Exam        Last 5 Patient Entered Readings                                      Current 30 Day Average: 138/90     Recent Readings 7/24/2020 7/16/2020 7/16/2020 7/15/2020 7/15/2020    SBP (mmHg) 143 137 146 141 134    DBP (mmHg) 97 93 92 88 88     Pulse 97 96 98 103 97        Last 6 Patient Entered Readings                                          Most Recent A1c:      Recent Readings 7/27/2020 7/26/2020 7/26/2020 7/26/2020 7/26/2020    Blood Glucose (mg/dL) 288 195 185 202 169

## 2020-07-27 NOTE — TELEPHONE ENCOUNTER
----- Message from Reba Dominguez sent at 7/27/2020  7:50 AM CDT -----  Type:  Needs Medical Advice    Who Called:  Pt  Yolanda  Symptoms (please be specific):  pt has been throwing up/nausea/headache all weekend//drinking alittle and she is weak//and her blood is not normal    How long has patient had these symptoms:   all weekend  Pharmacy name and phone #:   [facility]   Would the patient rather a call back or a response via [facility]?    Call back   Best Call Back Number:    [phone]  Additional Information:  please call asap//thanks/micheal  //

## 2020-07-27 NOTE — TELEPHONE ENCOUNTER
Spoke with pt  Regarding her not feeling well and to  Inform her  that  recommends that she goes to the E.R. or Urgent Care.Pt stated that she understood.//LB

## 2020-07-28 ENCOUNTER — TELEPHONE (OUTPATIENT)
Dept: ENDOCRINOLOGY | Facility: CLINIC | Age: 50
End: 2020-07-28

## 2020-07-28 ENCOUNTER — HOSPITAL ENCOUNTER (EMERGENCY)
Facility: HOSPITAL | Age: 50
Discharge: HOME | End: 2020-07-28
Attending: EMERGENCY MEDICINE
Payer: MEDICAID

## 2020-07-28 ENCOUNTER — TELEPHONE (OUTPATIENT)
Dept: INTERNAL MEDICINE | Facility: CLINIC | Age: 50
End: 2020-07-28

## 2020-07-28 VITALS
HEART RATE: 106 BPM | WEIGHT: 202.69 LBS | TEMPERATURE: 99 F | BODY MASS INDEX: 45.45 KG/M2 | OXYGEN SATURATION: 95 % | DIASTOLIC BLOOD PRESSURE: 78 MMHG | SYSTOLIC BLOOD PRESSURE: 136 MMHG | RESPIRATION RATE: 14 BRPM

## 2020-07-28 DIAGNOSIS — R07.9 CHEST PAIN, UNSPECIFIED TYPE: ICD-10-CM

## 2020-07-28 DIAGNOSIS — R73.9 HYPERGLYCEMIA: Primary | ICD-10-CM

## 2020-07-28 DIAGNOSIS — R07.9 CHEST PAIN: ICD-10-CM

## 2020-07-28 LAB
ALBUMIN SERPL BCP-MCNC: 3.7 G/DL (ref 3.5–5.2)
ALP SERPL-CCNC: 113 U/L (ref 55–135)
ALT SERPL W/O P-5'-P-CCNC: 56 U/L (ref 10–44)
ANION GAP SERPL CALC-SCNC: 13 MMOL/L (ref 8–16)
AST SERPL-CCNC: 83 U/L (ref 10–40)
B-OH-BUTYR BLD STRIP-SCNC: 0.1 MMOL/L (ref 0–0.5)
BASOPHILS # BLD AUTO: 0.02 K/UL (ref 0–0.2)
BASOPHILS NFR BLD: 0.2 % (ref 0–1.9)
BILIRUB SERPL-MCNC: 0.3 MG/DL (ref 0.1–1)
BNP SERPL-MCNC: <10 PG/ML (ref 0–99)
BUN SERPL-MCNC: 8 MG/DL (ref 6–20)
CALCIUM SERPL-MCNC: 9.8 MG/DL (ref 8.7–10.5)
CHLORIDE SERPL-SCNC: 101 MMOL/L (ref 95–110)
CO2 SERPL-SCNC: 24 MMOL/L (ref 23–29)
CREAT SERPL-MCNC: 0.8 MG/DL (ref 0.5–1.4)
DIFFERENTIAL METHOD: ABNORMAL
EOSINOPHIL # BLD AUTO: 0.3 K/UL (ref 0–0.5)
EOSINOPHIL NFR BLD: 3.5 % (ref 0–8)
ERYTHROCYTE [DISTWIDTH] IN BLOOD BY AUTOMATED COUNT: 15 % (ref 11.5–14.5)
EST. GFR  (AFRICAN AMERICAN): >60 ML/MIN/1.73 M^2
EST. GFR  (NON AFRICAN AMERICAN): >60 ML/MIN/1.73 M^2
GLUCOSE SERPL-MCNC: 192 MG/DL (ref 70–110)
HCT VFR BLD AUTO: 42.7 % (ref 37–48.5)
HGB BLD-MCNC: 12.8 G/DL (ref 12–16)
IMM GRANULOCYTES # BLD AUTO: 0.05 K/UL (ref 0–0.04)
IMM GRANULOCYTES NFR BLD AUTO: 0.6 % (ref 0–0.5)
LYMPHOCYTES # BLD AUTO: 2.8 K/UL (ref 1–4.8)
LYMPHOCYTES NFR BLD: 31 % (ref 18–48)
MCH RBC QN AUTO: 27.3 PG (ref 27–31)
MCHC RBC AUTO-ENTMCNC: 30 G/DL (ref 32–36)
MCV RBC AUTO: 91 FL (ref 82–98)
MONOCYTES # BLD AUTO: 0.5 K/UL (ref 0.3–1)
MONOCYTES NFR BLD: 5.2 % (ref 4–15)
NEUTROPHILS # BLD AUTO: 5.4 K/UL (ref 1.8–7.7)
NEUTROPHILS NFR BLD: 59.5 % (ref 38–73)
NRBC BLD-RTO: 0 /100 WBC
PLATELET # BLD AUTO: 316 K/UL (ref 150–350)
PMV BLD AUTO: 9.8 FL (ref 9.2–12.9)
POCT GLUCOSE: 202 MG/DL (ref 70–110)
POTASSIUM SERPL-SCNC: 4.6 MMOL/L (ref 3.5–5.1)
PROT SERPL-MCNC: 7.7 G/DL (ref 6–8.4)
RBC # BLD AUTO: 4.69 M/UL (ref 4–5.4)
SARS-COV-2 RDRP RESP QL NAA+PROBE: NEGATIVE
SODIUM SERPL-SCNC: 138 MMOL/L (ref 136–145)
TROPONIN I SERPL DL<=0.01 NG/ML-MCNC: 0.01 NG/ML (ref 0–0.03)
TROPONIN I SERPL DL<=0.01 NG/ML-MCNC: <0.006 NG/ML (ref 0–0.03)
WBC # BLD AUTO: 9.02 K/UL (ref 3.9–12.7)

## 2020-07-28 PROCEDURE — U0002 COVID-19 LAB TEST NON-CDC: HCPCS

## 2020-07-28 PROCEDURE — 83880 ASSAY OF NATRIURETIC PEPTIDE: CPT

## 2020-07-28 PROCEDURE — 80053 COMPREHEN METABOLIC PANEL: CPT

## 2020-07-28 PROCEDURE — 93010 ELECTROCARDIOGRAM REPORT: CPT | Mod: ,,, | Performed by: INTERNAL MEDICINE

## 2020-07-28 PROCEDURE — 99284 EMERGENCY DEPT VISIT MOD MDM: CPT | Mod: 25

## 2020-07-28 PROCEDURE — 93005 ELECTROCARDIOGRAM TRACING: CPT

## 2020-07-28 PROCEDURE — 96361 HYDRATE IV INFUSION ADD-ON: CPT

## 2020-07-28 PROCEDURE — 93010 EKG 12-LEAD: ICD-10-PCS | Mod: ,,, | Performed by: INTERNAL MEDICINE

## 2020-07-28 PROCEDURE — 25000003 PHARM REV CODE 250: Performed by: EMERGENCY MEDICINE

## 2020-07-28 PROCEDURE — 84484 ASSAY OF TROPONIN QUANT: CPT

## 2020-07-28 PROCEDURE — 63600175 PHARM REV CODE 636 W HCPCS: Performed by: EMERGENCY MEDICINE

## 2020-07-28 PROCEDURE — 96374 THER/PROPH/DIAG INJ IV PUSH: CPT

## 2020-07-28 PROCEDURE — 82010 KETONE BODYS QUAN: CPT

## 2020-07-28 PROCEDURE — 85025 COMPLETE CBC W/AUTO DIFF WBC: CPT

## 2020-07-28 PROCEDURE — 36415 COLL VENOUS BLD VENIPUNCTURE: CPT

## 2020-07-28 PROCEDURE — 82962 GLUCOSE BLOOD TEST: CPT

## 2020-07-28 RX ORDER — ONDANSETRON 2 MG/ML
4 INJECTION INTRAMUSCULAR; INTRAVENOUS
Status: COMPLETED | OUTPATIENT
Start: 2020-07-28 | End: 2020-07-28

## 2020-07-28 RX ADMIN — SODIUM CHLORIDE 1000 ML: 0.9 INJECTION, SOLUTION INTRAVENOUS at 01:07

## 2020-07-28 RX ADMIN — ONDANSETRON 4 MG: 2 INJECTION INTRAMUSCULAR; INTRAVENOUS at 12:07

## 2020-07-28 RX ADMIN — SODIUM CHLORIDE 1000 ML: 0.9 INJECTION, SOLUTION INTRAVENOUS at 11:07

## 2020-07-28 NOTE — TELEPHONE ENCOUNTER
----- Message from Ellen Ashby sent at 7/28/2020  3:23 PM CDT -----  Regarding: Patient  Type:  Sooner Apoointment Request    Caller is requesting a sooner appointment.  Caller declined first available appointment listed below.  Caller will not accept being placed on the waitlist and is requesting a message be sent to doctor.  Name of Caller:pt  When is the first available appointment?10/22/2020  Symptoms:hospital f/u   Would the patient rather a call back or a response via [facility]? Call back   Best Call Back Number:[phone] (home)   Additional Information: n/a

## 2020-07-28 NOTE — TELEPHONE ENCOUNTER
Pt calling has not eaten in 3 days nausea no vomitting or diarrhea BG elevated not drinking normal amounts because cannot hold anything down at present    BG at 719 =277  BP last night was 150/100 took a clonidine   BP at 172=799/87 pulse =127  Instructed to go to ER   She will let us know which ER she makes it to and when she arrives there

## 2020-07-28 NOTE — ED NOTES
The patient is resting quietly, arouses easily to stimuli. Airway is open and patent, respirations are spontaneous, normal respiratory effort and rate noted, skin warm and dry, appearance: in no acute distress and resting comfortably. Call light in reach, bed in lowest position, and rails up X2.

## 2020-07-28 NOTE — ED NOTES
Pt resting in bed, awake and alert, respirations even and unlabored, no acute distress noted. Pt denies any needs. Will continue to monitor.

## 2020-07-28 NOTE — ED NOTES
Pt provided warm blanket. Pt resting comfortably watching televison, bed in lowest position, and call light in reach. Pt AAOX4. Denies any other needs at this time.

## 2020-07-28 NOTE — ED PROVIDER NOTES
1 right low back right low back pain to palpation and with the SCRIBE #1 NOTE: I, Heidi Almendarez, am scribing for, and in the presence of, Maile Hines Do, MD. I have scribed the entire note.       History     Chief Complaint   Patient presents with    Chest Pain     +CP x3 days, HA, high blood pressure. +Nausea.      Review of patient's allergies indicates:   Allergen Reactions    Codeine Itching    Ibuprofen     Neuromuscular blockers, steroidal Hives     some    Penicillins     Sulfamethoxazole-trimethoprim     Latex, natural rubber Rash    Morphine Rash     itching    Norco [hydrocodone-acetaminophen] Itching, Rash and Hallucinations    Seconal [secobarbital sodium] Rash     itching    Tylox [oxycodone-acetaminophen] Rash         History of Present Illness     HPI    7/28/2020, 12:02 PM  History obtained from the patient      History of Present Illness: Yolanda Betts is a 50 y.o. female patient with a PMHx of asthma, bipolar 1 disorder, DM, dyslipidemia, GERD, HPV, Herpes, HLD, HTN, and schizoaffective disorder who presents to the Emergency Department for evaluation of chest pressure which onset gradually 3 days ago. Symptoms are constant and moderate in severity. No mitigating or exacerbating factors reported. Associated sxs include mild SOB, nausea, decreased appetite, and elevated blood sugar. Patient denies any diaphoresis, palpitations, extremity weakness/numbness, leg pain/swelling, dizziness, cough, vomiting, diarrhea, constipation, and all other sxs at this time. Patient is on  No further complaints or concerns at this time.       Arrival mode: Personal vehicle    PCP: CHARLA Beach MD        Past Medical History:  Past Medical History:   Diagnosis Date    Abnormal Pap smear of cervix     HPV genital warts    Anemia     Anxiety     Arthritis     Asthma 10/11/2016    Bipolar 1 disorder     Diabetes mellitus, type 2     Dyslipidemia associated with type 2 diabetes mellitus  2019    Genital warts     GERD (gastroesophageal reflux disease)     Herpes simplex virus (HSV) infection     Hyperlipidemia     Hypertension     Hypertension complicating diabetes 2019    Migraine with aura and without status migrainosus, not intractable 3/21/2016    Mild persistent asthma without complication 10/11/2016    Morbid obesity with body mass index (BMI) of 45.0 to 49.9 in adult 8/3/2017    Obstructive sleep apnea     ALEN (obstructive sleep apnea)     2L per N/C q HS    Schizoaffective disorder, bipolar type 2019    Seasonal allergic rhinitis due to pollen 2019    Type 2 diabetes mellitus with hyperglycemia, with long-term current use of insulin 2017    Type 2 diabetes mellitus with hyperglycemia, without long-term current use of insulin 2017       Past Surgical History:  Past Surgical History:   Procedure Laterality Date    abcess removed right back      BELT ABDOMINOPLASTY      BREAST SURGERY Bilateral 1996    Reduction     SECTION      X 2    COLONOSCOPY      COLONOSCOPY N/A 2018    Procedure: colonoscopy for iron deficiency anemia;  Surgeon: Frankie Sanchez MD;  Location: [facility] ENDO;  Service: Endoscopy;  Laterality: N/A;    COLONOSCOPY N/A 2018    Procedure: COLONOSCOPY;  Surgeon: Frankie Sanchez MD;  Location: [facility] ENDO;  Service: Endoscopy;  Laterality: N/A;    HYSTERECTOMY      w/ BSO; hypermenorrhea    MOUTH SURGERY      OOPHORECTOMY      hyst/bso, hypermenorrhea    ROBOT-ASSISTED CHOLECYSTECTOMY USING DA DARI XI N/A 1/3/2020    Procedure: XI ROBOTIC CHOLECYSTECTOMY;  Surgeon: Damir Driscoll MD;  Location: [facility] OR;  Service: General;  Laterality: N/A;    TOTAL REDUCTION MAMMOPLASTY      TUBAL LIGATION           Family History:  Family History   Problem Relation Age of Onset    Diabetes Mother     Hyperlipidemia Mother     Hypertension Mother     Asthma Mother     COPD Mother     Glaucoma  "Mother     Thyroid disease Mother     Anesthesia problems Mother         "almost had a cardiac arrest" , blood clots    Hypertension Father     Hyperlipidemia Father     Cancer Father         Brain, lung, liver, kidney    Heart disease Maternal Grandmother     Hyperlipidemia Maternal Grandmother     Hypertension Maternal Grandmother     Cataracts Maternal Grandmother     Diabetes Maternal Grandmother     Heart disease Maternal Grandfather     Hyperlipidemia Maternal Grandfather     Hypertension Maternal Grandfather     Glaucoma Maternal Grandfather     Cancer Maternal Grandfather     Cataracts Maternal Grandfather     Macular degeneration Maternal Grandfather     Diabetes Maternal Grandfather     Heart disease Paternal Grandmother     Hyperlipidemia Paternal Grandmother     Hypertension Paternal Grandmother     Cataracts Paternal Grandmother     Heart disease Paternal Grandfather     Hyperlipidemia Paternal Grandfather     Hypertension Paternal Grandfather     Cataracts Paternal Grandfather     Breast cancer Maternal Cousin     Breast cancer Maternal Cousin     Breast cancer Maternal Cousin     Breast cancer Maternal Cousin        Social History:  Social History     Tobacco Use    Smoking status: Never Smoker    Smokeless tobacco: Never Used   Substance and Sexual Activity    Alcohol use: Not Currently     Alcohol/week: 0.0 standard drinks     Comment: socially  No alcohol 72h prior to sx    Drug use: No    Sexual activity: Not Currently     Partners: Male        Review of Systems     Review of Systems   Constitutional: Positive for appetite change (decreased). Negative for activity change, chills, diaphoresis, fatigue and fever.   HENT: Negative for congestion, ear pain, nosebleeds, rhinorrhea, sinus pain, sore throat and trouble swallowing.    Eyes: Negative for pain and discharge.   Respiratory: Positive for shortness of breath. Negative for cough, chest tightness, wheezing and " stridor.    Cardiovascular: Negative for chest pain, palpitations and leg swelling.        (+) chest pressure   Gastrointestinal: Positive for nausea. Negative for abdominal distention, abdominal pain, blood in stool, constipation, diarrhea and vomiting.   Endocrine:        (+) elevated blood sugar   Genitourinary: Negative for difficulty urinating, dysuria, flank pain, frequency, hematuria and urgency.   Musculoskeletal: Negative for arthralgias, back pain, myalgias and neck pain.   Skin: Negative for pallor, rash and wound.   Neurological: Negative for dizziness, syncope, weakness, light-headedness, numbness and headaches.   Hematological: Does not bruise/bleed easily.   Psychiatric/Behavioral: Negative for confusion and self-injury.   All other systems reviewed and are negative.     Physical Exam     Initial Vitals [07/28/20 1017]   BP Pulse Resp Temp SpO2   134/86 (!) 122 20 98.8 °F (37.1 °C) 96 %      MAP       --          Physical Exam  Nursing Notes and Vital Signs Reviewed.  Constitutional: Patient is in no acute distress. Obese.   Head: Atraumatic. Normocephalic.  Eyes:  EOM intact. Conjunctivae are not pale. No scleral icterus.  ENT: Mucous membranes are moist. Oropharynx is clear and symmetric.    Neck: Supple. Full ROM.   Cardiovascular: Tachycardic. Regular rhythm. No murmurs, rubs, or gallops. Distal pulses are 2+ and symmetric.  Pulmonary/Chest: No respiratory distress. Clear to auscultation bilaterally. No wheezing or rales.  Abdominal: Soft and non-distended.  There is no tenderness.  No rebound, guarding, or rigidity. Good bowel sounds.  Genitourinary: No CVA tenderness  Musculoskeletal: Moves all extremities. No obvious deformities. No edema. No calf tenderness.  Skin: Warm and dry.  Neurological:  Alert, awake, and appropriate.  Normal speech.  No acute focal neurological deficits are appreciated.  Psychiatric: Normal affect. Good eye contact. Appropriate in content.     ED Course    Procedures  ED Vital Signs:  Vitals:    07/28/20 1017 07/28/20 1049 07/28/20 1101 07/28/20 1201   BP: 134/86  139/87 134/82   Pulse: (!) 122 (!) 115 (!) 115 109   Resp: 20  18 (!) 23   Temp: 98.8 °F (37.1 °C)      TempSrc: Oral      SpO2: 96%  96% 97%   Weight: 92 kg (202 lb 11.4 oz)       07/28/20 1342 07/28/20 1501   BP: 135/77 136/78   Pulse: 107 106   Resp: 18 14   Temp:     TempSrc:     SpO2: 96% 95%   Weight:         Abnormal Lab Results:  Labs Reviewed   CBC W/ AUTO DIFFERENTIAL - Abnormal; Notable for the following components:       Result Value    Mean Corpuscular Hemoglobin Conc 30.0 (*)     RDW 15.0 (*)     Immature Granulocytes 0.6 (*)     Immature Grans (Abs) 0.05 (*)     All other components within normal limits   COMPREHENSIVE METABOLIC PANEL - Abnormal; Notable for the following components:    Glucose 192 (*)     AST 83 (*)     ALT 56 (*)     All other components within normal limits   POCT GLUCOSE - Abnormal; Notable for the following components:    POCT Glucose 202 (*)     All other components within normal limits   TROPONIN I   TROPONIN I   B-TYPE NATRIURETIC PEPTIDE   BETA - HYDROXYBUTYRATE, SERUM   SARS-COV-2 RNA AMPLIFICATION, QUAL   POCT GLUCOSE MONITORING CONTINUOUS        All Lab Results:  Results for orders placed or performed during the hospital encounter of 07/28/20   CBC auto differential   Result Value Ref Range    WBC 9.02 3.90 - 12.70 K/uL    RBC 4.69 4.00 - 5.40 M/uL    Hemoglobin 12.8 12.0 - 16.0 g/dL    Hematocrit 42.7 37.0 - 48.5 %    Mean Corpuscular Volume 91 82 - 98 fL    Mean Corpuscular Hemoglobin 27.3 27.0 - 31.0 pg    Mean Corpuscular Hemoglobin Conc 30.0 (L) 32.0 - 36.0 g/dL    RDW 15.0 (H) 11.5 - 14.5 %    Platelets 316 150 - 350 K/uL    MPV 9.8 9.2 - 12.9 fL    Immature Granulocytes 0.6 (H) 0.0 - 0.5 %    Gran # (ANC) 5.4 1.8 - 7.7 K/uL    Immature Grans (Abs) 0.05 (H) 0.00 - 0.04 K/uL    Lymph # 2.8 1.0 - 4.8 K/uL    Mono # 0.5 0.3 - 1.0 K/uL    Eos # 0.3 0.0 - 0.5  K/uL    Baso # 0.02 0.00 - 0.20 K/uL    nRBC 0 0 /100 WBC    Gran% 59.5 38.0 - 73.0 %    Lymph% 31.0 18.0 - 48.0 %    Mono% 5.2 4.0 - 15.0 %    Eosinophil% 3.5 0.0 - 8.0 %    Basophil% 0.2 0.0 - 1.9 %    Differential Method Automated    Comprehensive metabolic panel   Result Value Ref Range    Sodium 138 136 - 145 mmol/L    Potassium 4.6 3.5 - 5.1 mmol/L    Chloride 101 95 - 110 mmol/L    CO2 24 23 - 29 mmol/L    Glucose 192 (H) 70 - 110 mg/dL    BUN, Bld 8 6 - 20 mg/dL    Creatinine 0.8 0.5 - 1.4 mg/dL    Calcium 9.8 8.7 - 10.5 mg/dL    Total Protein 7.7 6.0 - 8.4 g/dL    Albumin 3.7 3.5 - 5.2 g/dL    Total Bilirubin 0.3 0.1 - 1.0 mg/dL    Alkaline Phosphatase 113 55 - 135 U/L    AST 83 (H) 10 - 40 U/L    ALT 56 (H) 10 - 44 U/L    Anion Gap 13 8 - 16 mmol/L    eGFR if African American >60 >60 mL/min/1.73 m^2    eGFR if non African American >60 >60 mL/min/1.73 m^2   Troponin I #1   Result Value Ref Range    Troponin I <0.006 0.000 - 0.026 ng/mL   Troponin I #2   Result Value Ref Range    Troponin I 0.013 0.000 - 0.026 ng/mL   B-Type natriuretic peptide (BNP)   Result Value Ref Range    BNP <10 0 - 99 pg/mL   Beta - Hydroxybutyrate, Serum   Result Value Ref Range    Beta-Hydroxybutyrate 0.1 0.0 - 0.5 mmol/L   COVID-19 Rapid Screening   Result Value Ref Range    SARS-CoV-2 RNA, Amplification, Qual Negative Negative   POCT glucose   Result Value Ref Range    POCT Glucose 202 (H) 70 - 110 mg/dL         Imaging Results:  Imaging Results          X-Ray Chest AP Portable (Final result)  Result time 07/28/20 11:38:53    Final result by Neville Preciado MD (07/28/20 11:38:53)                 Impression:      No acute findings.      Electronically signed by: Neville Preciado MD  Date:    07/28/2020  Time:    11:38             Narrative:    EXAMINATION:  XR CHEST AP PORTABLE    CLINICAL HISTORY:  Acute chest pain, Chest Pain;    COMPARISON:  06/26/2020    FINDINGS:  Heart size is normal. The lung fields are clear. No  acute cardiopulmonary infiltrate.                                 The EKG was ordered, reviewed, and independently interpreted by the ED provider.  Interpretation time: 10:26  Rate: 122 BPM  Rhythm: sinus tachycardia  Interpretation: No acute ST changes. No STEMI.           The Emergency Provider reviewed the vital signs and test results, which are outlined above.     ED Discussion     3:04 PM: Reassessed pt at this time. Pt should f/u with cardiology. Discussed with pt all pertinent ED information and results. Discussed pt dx and plan of tx. Gave pt all f/u and return to the ED instructions. All questions and concerns were addressed at this time. Pt expresses understanding of information and instructions, and is comfortable with plan to discharge. Pt is stable for discharge.    I discussed with patient that evaluation in the ED does not suggest any emergent or life threatening medical conditions requiring immediate intervention beyond what was provided in the ED, and I believe patient is safe for discharge.  Regardless, an unremarkable evaluation in the ED does not preclude the development or presence of a serious of life threatening condition. As such, patient was instructed to return immediately for any worsening or change in current symptoms.         Medical Decision Making:   Clinical Tests:   Lab Tests: Ordered and Reviewed  Radiological Study: Ordered and Reviewed  Medical Tests: Ordered and Reviewed           ED Medication(s):  Medications   sodium chloride 0.9% bolus 1,000 mL (0 mLs Intravenous Stopped 7/28/20 1233)   ondansetron injection 4 mg (4 mg Intravenous Given 7/28/20 1214)   sodium chloride 0.9% bolus 1,000 mL (0 mLs Intravenous Stopped 7/28/20 1414)       Discharge Medication List as of 7/28/2020  3:01 PM          Follow-up Information     CHARLA Beach MD In 2 days.    Specialty: Family Medicine  Contact information:  [id number]  [place] [address] [phone]             Nav  MD Bg In 2 days.    Specialties: Cardiology, Cardiovascular Disease  Contact information:  [id number]  [place] [address]  [phone]                       Scribe Attestation:   Scribe #1: I performed the above scribed service and the documentation accurately describes the services I performed. I attest to the accuracy of the note.     Attending:   Physician Attestation Statement for Scribe #1: I, Maile Hines Do, MD, personally performed the services described in this documentation, as scribed by Heidi Almendarez, in my presence, and it is both accurate and complete.           Clinical Impression       ICD-10-CM ICD-9-CM   1. Hyperglycemia  R73.9 790.29   2. Chest pain  R07.9 786.50   3. Chest pain, unspecified type  R07.9 786.50       Disposition:   Disposition: Discharged  Condition: Stable         Maile Hines Do, MD  07/28/20 1534

## 2020-07-29 ENCOUNTER — PATIENT OUTREACH (OUTPATIENT)
Dept: ADMINISTRATIVE | Facility: OTHER | Age: 50
End: 2020-07-29

## 2020-07-30 ENCOUNTER — OFFICE VISIT (OUTPATIENT)
Dept: ENDOCRINOLOGY | Facility: CLINIC | Age: 50
End: 2020-07-30
Payer: MEDICAID

## 2020-07-30 DIAGNOSIS — K76.0 FATTY LIVER: ICD-10-CM

## 2020-07-30 DIAGNOSIS — R11.0 NAUSEA: ICD-10-CM

## 2020-07-30 DIAGNOSIS — R19.7 DIARRHEA, UNSPECIFIED TYPE: ICD-10-CM

## 2020-07-30 DIAGNOSIS — R74.8 ELEVATED LIVER ENZYMES: ICD-10-CM

## 2020-07-30 DIAGNOSIS — I10 ESSENTIAL HYPERTENSION: ICD-10-CM

## 2020-07-30 PROCEDURE — 99214 PR OFFICE/OUTPT VISIT, EST, LEVL IV, 30-39 MIN: ICD-10-PCS | Mod: S$PBB,,, | Performed by: INTERNAL MEDICINE

## 2020-07-30 PROCEDURE — 99214 OFFICE O/P EST MOD 30 MIN: CPT | Mod: PBBFAC | Performed by: INTERNAL MEDICINE

## 2020-07-30 PROCEDURE — 99999 PR PBB SHADOW E&M-EST. PATIENT-LVL IV: CPT | Mod: PBBFAC,,, | Performed by: INTERNAL MEDICINE

## 2020-07-30 PROCEDURE — 99214 OFFICE O/P EST MOD 30 MIN: CPT | Mod: S$PBB,,, | Performed by: INTERNAL MEDICINE

## 2020-07-30 PROCEDURE — 99999 PR PBB SHADOW E&M-EST. PATIENT-LVL IV: ICD-10-PCS | Mod: PBBFAC,,, | Performed by: INTERNAL MEDICINE

## 2020-07-30 RX ORDER — PROMETHAZINE HYDROCHLORIDE 25 MG/1
TABLET ORAL
COMMUNITY
Start: 2020-07-27 | End: 2021-01-15

## 2020-07-30 RX ORDER — GLIMEPIRIDE 2 MG/1
2 TABLET ORAL
Qty: 30 TABLET | Refills: 3 | Status: SHIPPED | OUTPATIENT
Start: 2020-07-30 | End: 2020-08-11 | Stop reason: SDUPTHER

## 2020-07-30 NOTE — PROGRESS NOTES
Patient ID: Yolanda Betts is a 50 y.o. female.        Chief Complaint: Follow-up (nausea still there on antiemetic ate yesterday increase in fluids) and Diabetes (digital diabetes)      Diabetes  Pertinent negatives for hypoglycemia include no confusion, dizziness or headaches. Pertinent negatives for diabetes include no chest pain, no fatigue, no polydipsia, no polyphagia, no polyuria and no weakness.   Follow-up  Associated symptoms include nausea. Pertinent negatives include no arthralgias, chest pain, fatigue, fever, headaches, joint swelling, numbness, rash, sore throat, vomiting or weakness.       Yolanda Betts is here for follow-up  of type 2 Diabetes Mellitus    Consultation requested by Dr. Ermias Mccormick    PCP: CHARLA Beach MD      Diagnosed:  Around 2000 and has been on insulin several years.  Had better control earlier on around her diagnosis  Saw Millie Pruitt 2/2018 saw endocrinologist once in 2016, Dr. Mejia    She had a normal C-peptide April 2018 4.56    Interval history:  She is called several times with complaints of hyperglycemia, her insulin doses have had to be adjusted, also complaining a nausea and other complaints such as diarrhea.  In June she was seen in the ER for chest pain.  She has seen Gastroenterology for diarrhea and was diagnosed with irritable bowel syndrome associated with constipation and diarrhea, x-ray showed lots of stool and MiraLax was recommended.  She also suffers from multiple arthralgias and is followed by Rheumatology, it seems the pain is refractory to her medical management  Was seen recently July 28th in the emergency room again for chest pain but also complained of headache, elevated blood pressure and elevated sugar and nausea and treated with IV fluids.  She already had her gallbladder taken out within the past year  Recent labs done in the ER showed her liver enzymes were a  little high.  In review of records when she had an abdominal  ultrasound showed fatty liver and gallstones.  She has only been able to tolerate some liquids and crackers and even the she is giving the high doses of insulin she has not dropped her sugars  Past Medical History:   Diagnosis Date    Anemia     Anxiety     Arthritis     Asthma 10/11/2016    Bipolar 1 disorder     Diabetes mellitus, type 2     Dyslipidemia associated with type 2 diabetes mellitus 5/13/2019    GERD (gastroesophageal reflux disease)     Hypertension     Hypertension complicating diabetes 5/5/2019    Mild persistent asthma without complication 10/11/2016    Morbid obesity with body mass index (BMI) of 45.0 to 49.9 in adult 8/3/2017    Obstructive sleep apnea     Schizoaffective disorder, bipolar type 4/25/2019    Seasonal allergic rhinitis due to pollen 5/13/2019    Type 2 diabetes mellitus with hyperglycemia, without long-term current use of insulin 12/21/2017       Lab Results   Component Value Date    HGBA1C 11.6 (H) 07/07/2020    HGBA1C 7.1 (H) 02/18/2020    HGBA1C 9.9 (H) 11/18/2019     Hemoglobin A1C   Date Value Ref Range Status   06/24/2019 7.7 (H) 4.0 - 5.6 % Final     Comment:     ADA Screening Guidelines:  5.7-6.4%  Consistent with prediabetes  >or=6.5%  Consistent with diabetes  High levels of fetal hemoglobin interfere with the HbA1C  assay. Heterozygous hemoglobin variants (HbS, HgC, etc)do  not significantly interfere with this assay.   However, presence of multiple variants may affect accuracy.       Lab Results   Component Value Date    HGBA1C 8.3 (H) 12/22/2017    HGBA1C 8.5 (H) 01/31/2017    HGBA1C 6.6 (H) 03/22/2016       Diabetes medications include: Toujeo   96 qhs  Novolog 35 to 40  units tid  Metformin  1000mg bid and Victoza 1.8 mg a day       Off Jardiance as she developed multiple yeast infections     Tried glyburide in the past  Previously was on Levemir    She also developed an abscess on the right flank that has been irrigated and debrided twice.  Still has  abdominal distention and complains of pelvic discomfort as well as discomfort in her flank area; her gyn did order the CT of the abdomen and this was denied by her insurance; her liver function tests on her last evaluation in October 2019 were elevated; she denies any alcohol use.  I ordered an ultrasound that showed fatty liver      She is being followed by Cardiology for history of tachycardia and an echocardiogram and Holter were ordered and also has hypertension    History of obstructive sleep apnea but not using CPAP    Diabetes Complications:peripheral neuropathy  Toes and fingers have numbness and Cymbalta helps she says  Hypoglycemia: NO      Meal Planning:  She has struggled to maintain a healthy diet, trying to improve this    Lab Results   Component Value Date    POCGLU 269 (A) 11/11/2019       Diabetes education: YES:  2019 has started seeing diabetes educator    SMBG: Tests BG four times a day    Log or meter available:  Verbal report  Home values if available:  FBG:  Reviewed her digit early entered sugars and they have been running in the 200sPre-lunch:  Pre-dinner:  Bedtime:  Post Breakfast:  Post Lunch  Post Dinner  Ranges:      Exercise: No     STANDARDS of CARE:        ACE inhibitor or angiotensin II receptor blocker:  Yes        Statin drug:  Yes        Eye exam within last year: Yes        Influenza vaccine up to date: Yes        Pneumonia vaccine:yes         No family history of medullary thyroid cancer and no history of pancreatitis      Interval history:  No longer having the significant shortness of breath and chest discomfort she had at last visit after being seen multiple times in the emergency room and her sugars are better I have reviewed the past medical, family and social history    Review of Systems   Constitutional: Negative for appetite change, fatigue, fever and unexpected weight change.   HENT: Negative for sore throat and trouble swallowing.    Eyes: Positive for visual  disturbance.        Has blurry vision   Respiratory: Negative for chest tightness, shortness of breath and wheezing.    Cardiovascular: Negative for chest pain, palpitations and leg swelling.   Gastrointestinal: Positive for diarrhea and nausea. Negative for abdominal distention and vomiting.   Endocrine: Negative for cold intolerance, heat intolerance, polydipsia, polyphagia and polyuria.   Genitourinary: Negative for difficulty urinating, dysuria, menstrual problem and pelvic pain.   Musculoskeletal: Negative for arthralgias and joint swelling.   Skin: Negative for rash.   Neurological: Negative for dizziness, weakness, numbness and headaches.   Psychiatric/Behavioral: Negative for confusion, dysphoric mood and sleep disturbance.       Objective:      Physical Exam  Constitutional:       General: She is not in acute distress.  Eyes:      General: No scleral icterus.  Skin:     Comments: Acanthosis in posterior neck area   Neurological:      Mental Status: She is alert.   Psychiatric:         Mood and Affect: Mood normal.         Behavior: Behavior normal.           Lab Review:   Admission on 07/28/2020, Discharged on 07/28/2020   Component Date Value    WBC 07/28/2020 9.02     RBC 07/28/2020 4.69     Hemoglobin 07/28/2020 12.8     Hematocrit 07/28/2020 42.7     Mean Corpuscular Volume 07/28/2020 91     Mean Corpuscular Hemoglo* 07/28/2020 27.3     Mean Corpuscular Hemoglo* 07/28/2020 30.0*    RDW 07/28/2020 15.0*    Platelets 07/28/2020 316     MPV 07/28/2020 9.8     Immature Granulocytes 07/28/2020 0.6*    Gran # (ANC) 07/28/2020 5.4     Immature Grans (Abs) 07/28/2020 0.05*    Lymph # 07/28/2020 2.8     Mono # 07/28/2020 0.5     Eos # 07/28/2020 0.3     Baso # 07/28/2020 0.02     nRBC 07/28/2020 0     Gran% 07/28/2020 59.5     Lymph% 07/28/2020 31.0     Mono% 07/28/2020 5.2     Eosinophil% 07/28/2020 3.5     Basophil% 07/28/2020 0.2     Differential Method 07/28/2020 Automated     Sodium  07/28/2020 138     Potassium 07/28/2020 4.6     Chloride 07/28/2020 101     CO2 07/28/2020 24     Glucose 07/28/2020 192*    BUN, Bld 07/28/2020 8     Creatinine 07/28/2020 0.8     Calcium 07/28/2020 9.8     Total Protein 07/28/2020 7.7     Albumin 07/28/2020 3.7     Total Bilirubin 07/28/2020 0.3     Alkaline Phosphatase 07/28/2020 113     AST 07/28/2020 83*    ALT 07/28/2020 56*    Anion Gap 07/28/2020 13     eGFR if African American 07/28/2020 >60     eGFR if non African Amer* 07/28/2020 >60     Troponin I 07/28/2020 <0.006     Troponin I 07/28/2020 0.013     BNP 07/28/2020 <10     Beta-Hydroxybutyrate 07/28/2020 0.1     SARS-CoV-2 RNA, Amplific* 07/28/2020 Negative     POCT Glucose 07/28/2020 202*   Lab Visit on 07/20/2020   Component Date Value    Specimen UA 07/20/2020 Urine, Clean Catch     Color, UA 07/20/2020 Yellow     Appearance, UA 07/20/2020 Clear     pH, UA 07/20/2020 6.0     Specific Gravity, UA 07/20/2020 1.025     Protein, UA 07/20/2020 Negative     Glucose, UA 07/20/2020 3+*    Ketones, UA 07/20/2020 Negative     Bilirubin (UA) 07/20/2020 Negative     Occult Blood UA 07/20/2020 Negative     Nitrite, UA 07/20/2020 Negative     Leukocytes, UA 07/20/2020 Negative     RBC, UA 07/20/2020 3     WBC, UA 07/20/2020 1     Bacteria 07/20/2020 Occasional     Yeast, UA 07/20/2020 None     Squam Epithel, UA 07/20/2020 5     Microscopic Comment 07/20/2020 SEE COMMENT    Lab Visit on 07/07/2020   Component Date Value    Cholesterol 07/07/2020 115*    Triglycerides 07/07/2020 547*    HDL 07/07/2020 31*    LDL Cholesterol 07/07/2020 Invalid, Trig>400.0     Hdl/Cholesterol Ratio 07/07/2020 27.0     Total Cholesterol/HDL Ra* 07/07/2020 3.7     Non-HDL Cholesterol 07/07/2020 84     Hemoglobin A1C 07/07/2020 11.6*    Estimated Avg Glucose 07/07/2020 286*   Lab Visit on 07/07/2020   Component Date Value    Microalbum.,U,Random 07/07/2020 48.0     Creatinine, Random Ur  07/07/2020 73.0     Microalb Creat Ratio 07/07/2020 65.8*   Admission on 06/26/2020, Discharged on 06/26/2020   Component Date Value    WBC 06/26/2020 9.40     RBC 06/26/2020 4.37     Hemoglobin 06/26/2020 12.2     Hematocrit 06/26/2020 40.2     Mean Corpuscular Volume 06/26/2020 92     Mean Corpuscular Hemoglo* 06/26/2020 27.9     Mean Corpuscular Hemoglo* 06/26/2020 30.3*    RDW 06/26/2020 14.1     Platelets 06/26/2020 301     MPV 06/26/2020 9.9     Immature Granulocytes 06/26/2020 0.5     Gran # (ANC) 06/26/2020 5.4     Immature Grans (Abs) 06/26/2020 0.05*    Lymph # 06/26/2020 3.3     Mono # 06/26/2020 0.5     Eos # 06/26/2020 0.2     Baso # 06/26/2020 0.07     nRBC 06/26/2020 0     Gran% 06/26/2020 57.4     Lymph% 06/26/2020 34.7     Mono% 06/26/2020 4.8     Eosinophil% 06/26/2020 1.9     Basophil% 06/26/2020 0.7     Differential Method 06/26/2020 Automated     Sodium 06/26/2020 134*    Potassium 06/26/2020 5.1     Chloride 06/26/2020 99     CO2 06/26/2020 23     Glucose 06/26/2020 271*    BUN, Bld 06/26/2020 11     Creatinine 06/26/2020 0.8     Calcium 06/26/2020 9.9     Total Protein 06/26/2020 8.0     Albumin 06/26/2020 4.0     Total Bilirubin 06/26/2020 0.2     Alkaline Phosphatase 06/26/2020 122     AST 06/26/2020 59*    ALT 06/26/2020 56*    Anion Gap 06/26/2020 12     eGFR if African American 06/26/2020 >60     eGFR if non African Amer* 06/26/2020 >60     Specimen UA 06/26/2020 Urine, Clean Catch     Color, UA 06/26/2020 Yellow     Appearance, UA 06/26/2020 Clear     pH, UA 06/26/2020 6.0     Specific Andrews, UA 06/26/2020 1.020     Protein, UA 06/26/2020 Negative     Glucose, UA 06/26/2020 3+*    Ketones, UA 06/26/2020 Negative     Bilirubin (UA) 06/26/2020 Negative     Occult Blood UA 06/26/2020 Negative     Nitrite, UA 06/26/2020 Negative     Urobilinogen, UA 06/26/2020 Negative     Leukocytes, UA 06/26/2020 Negative     Troponin I 06/26/2020  0.007     BNP 06/26/2020 <10     Prothrombin Time 06/26/2020 10.9     INR 06/26/2020 1.0     WBC, UA 06/26/2020 1     Bacteria 06/26/2020 Occasional     Yeast, UA 06/26/2020 None     Squam Epithel, UA 06/26/2020 6     Microscopic Comment 06/26/2020 SEE COMMENT    Office Visit on 06/22/2020   Component Date Value    Color, UA 06/22/2020 Straw     Spec Grav UA 06/22/2020 1.020     pH, UA 06/22/2020 5     WBC, UA 06/22/2020 trace     Nitrite, UA 06/22/2020 neg     Protein 06/22/2020 neg     Glucose, UA 06/22/2020 1,000     Ketones, UA 06/22/2020 neg     Urobilinogen, UA 06/22/2020 neg     Bilirubin 06/22/2020 neg     Blood, UA 06/22/2020 5-10     Clarity, UA 06/22/2020 Clear     SARS-CoV2 (COVID-19) Kishan* 06/22/2020 Not Detected    Admission on 04/19/2020, Discharged on 04/19/2020   Component Date Value    WBC 04/19/2020 7.39     RBC 04/19/2020 3.79*    Hemoglobin 04/19/2020 11.0*    Hematocrit 04/19/2020 35.2*    Mean Corpuscular Volume 04/19/2020 93     Mean Corpuscular Hemoglo* 04/19/2020 29.0     Mean Corpuscular Hemoglo* 04/19/2020 31.3*    RDW 04/19/2020 15.3*    Platelets 04/19/2020 304     MPV 04/19/2020 9.8     Immature Granulocytes 04/19/2020 0.7*    Gran # (ANC) 04/19/2020 3.6     Immature Grans (Abs) 04/19/2020 0.05*    Lymph # 04/19/2020 3.1     Mono # 04/19/2020 0.4     Eos # 04/19/2020 0.2     Baso # 04/19/2020 0.06     nRBC 04/19/2020 0     Gran% 04/19/2020 48.5     Lymph% 04/19/2020 42.2     Mono% 04/19/2020 5.8     Eosinophil% 04/19/2020 2.0     Basophil% 04/19/2020 0.8     Differential Method 04/19/2020 Automated     Sodium 04/19/2020 137     Potassium 04/19/2020 5.0     Chloride 04/19/2020 103     CO2 04/19/2020 20*    Glucose 04/19/2020 343*    BUN, Bld 04/19/2020 14     Creatinine 04/19/2020 0.8     Calcium 04/19/2020 9.0     Total Protein 04/19/2020 7.1     Albumin 04/19/2020 3.5     Total Bilirubin 04/19/2020 0.2     Alkaline Phosphatase  04/19/2020 92     AST 04/19/2020 25     ALT 04/19/2020 29     Anion Gap 04/19/2020 14     eGFR if African American 04/19/2020 >60     eGFR if non African Amer* 04/19/2020 >60     Magnesium 04/19/2020 1.7     Troponin I 04/19/2020 0.007     SARS-CoV-2 RNA, Amplific* 04/19/2020 Negative     Specimen UA 04/19/2020 Urine, Clean Catch     Color, UA 04/19/2020 Yellow     Appearance, UA 04/19/2020 Clear     pH, UA 04/19/2020 6.0     Specific Gravity, UA 04/19/2020 1.025     Protein, UA 04/19/2020 Negative     Glucose, UA 04/19/2020 3+*    Ketones, UA 04/19/2020 Negative     Bilirubin (UA) 04/19/2020 Negative     Occult Blood UA 04/19/2020 Negative     Nitrite, UA 04/19/2020 Negative     Urobilinogen, UA 04/19/2020 Negative     Leukocytes, UA 04/19/2020 Negative     POCT Glucose 04/19/2020 331*    WBC, UA 04/19/2020 2     Bacteria 04/19/2020 Occasional     Yeast, UA 04/19/2020 None     Squam Epithel, UA 04/19/2020 10     Microscopic Comment 04/19/2020 SEE COMMENT     POCT Glucose 04/19/2020 177*   Lab Visit on 04/17/2020   Component Date Value    WBC 04/17/2020 7.32     RBC 04/17/2020 3.97*    Hemoglobin 04/17/2020 11.5*    Hematocrit 04/17/2020 36.5*    Mean Corpuscular Volume 04/17/2020 92     Mean Corpuscular Hemoglo* 04/17/2020 29.0     Mean Corpuscular Hemoglo* 04/17/2020 31.5*    RDW 04/17/2020 15.5*    Platelets 04/17/2020 296     MPV 04/17/2020 9.5     Immature Granulocytes 04/17/2020 0.5     Gran # (ANC) 04/17/2020 4.0     Immature Grans (Abs) 04/17/2020 0.04     Lymph # 04/17/2020 2.7     Mono # 04/17/2020 0.4     Eos # 04/17/2020 0.2     Baso # 04/17/2020 0.06     nRBC 04/17/2020 0     Gran% 04/17/2020 54.4     Lymph% 04/17/2020 37.3     Mono% 04/17/2020 5.2     Eosinophil% 04/17/2020 2.3     Basophil% 04/17/2020 0.8     Differential Method 04/17/2020 Automated     Sodium 04/17/2020 138     Potassium 04/17/2020 4.9     Chloride 04/17/2020 100     CO2  04/17/2020 24     Glucose 04/17/2020 264*    BUN, Bld 04/17/2020 14     Creatinine 04/17/2020 0.7     Calcium 04/17/2020 9.8     Total Protein 04/17/2020 7.3     Albumin 04/17/2020 3.9     Total Bilirubin 04/17/2020 0.1     Alkaline Phosphatase 04/17/2020 93     AST 04/17/2020 19     ALT 04/17/2020 33     Anion Gap 04/17/2020 14     eGFR if African American 04/17/2020 >60     eGFR if non African Amer* 04/17/2020 >60    Lab Visit on 04/17/2020   Component Date Value    Specimen UA 04/17/2020 Urine, Clean Catch     Color, UA 04/17/2020 Yellow     Appearance, UA 04/17/2020 Clear     pH, UA 04/17/2020 5.0     Specific Gravity, UA 04/17/2020 1.025     Protein, UA 04/17/2020 Negative     Glucose, UA 04/17/2020 3+*    Ketones, UA 04/17/2020 Negative     Bilirubin (UA) 04/17/2020 Negative     Occult Blood UA 04/17/2020 Negative     Nitrite, UA 04/17/2020 Negative     Leukocytes, UA 04/17/2020 Negative     Urine Culture, Routine 04/17/2020 No significant growth     RBC, UA 04/17/2020 3     WBC, UA 04/17/2020 2     Bacteria 04/17/2020 Rare     Yeast, UA 04/17/2020 None     Squam Epithel, UA 04/17/2020 2     Microscopic Comment 04/17/2020 SEE COMMENT    Admission on 04/11/2020, Discharged on 04/11/2020   Component Date Value    HIV 1/2 Ag/Ab 04/11/2020 Negative     WBC 04/11/2020 10.59     RBC 04/11/2020 4.22     Hemoglobin 04/11/2020 11.9*    Hematocrit 04/11/2020 38.5     Mean Corpuscular Volume 04/11/2020 91     Mean Corpuscular Hemoglo* 04/11/2020 28.2     Mean Corpuscular Hemoglo* 04/11/2020 30.9*    RDW 04/11/2020 15.9*    Platelets 04/11/2020 303     MPV 04/11/2020 10.0     Immature Granulocytes 04/11/2020 0.6*    Gran # (ANC) 04/11/2020 5.9     Immature Grans (Abs) 04/11/2020 0.06*    Lymph # 04/11/2020 3.9     Mono # 04/11/2020 0.5     Eos # 04/11/2020 0.2     Baso # 04/11/2020 0.07     nRBC 04/11/2020 0     Gran% 04/11/2020 56.0     Lymph% 04/11/2020 36.4      Mono% 04/11/2020 4.5     Eosinophil% 04/11/2020 1.8     Basophil% 04/11/2020 0.7     Differential Method 04/11/2020 Automated     Troponin I 04/11/2020 <0.006     BNP 04/11/2020 <10     Sodium 04/11/2020 132*    Potassium 04/11/2020 4.4     Chloride 04/11/2020 97     CO2 04/11/2020 24     Glucose 04/11/2020 294*    BUN, Bld 04/11/2020 11     Creatinine 04/11/2020 0.7     Calcium 04/11/2020 9.6     Total Protein 04/11/2020 7.5     Albumin 04/11/2020 3.9     Total Bilirubin 04/11/2020 0.2     Alkaline Phosphatase 04/11/2020 90     AST 04/11/2020 20     ALT 04/11/2020 30     Anion Gap 04/11/2020 11     eGFR if African American 04/11/2020 >60.0     eGFR if non African Amer* 04/11/2020 >60.0     Troponin I 04/11/2020 0.006    There may be more visits with results that are not included.       Assessment:     1. Type 2 diabetes, uncontrolled, with neuropathy     2. Essential hypertension     3. Elevated liver enzymes     4. Fatty liver     5. Nausea     6. Diarrhea, unspecified type      patient with persistent hyperglycemia along with nausea and diarrhea.  She is requiring high doses of insulin, indicating severe insulin resistance.  There may be multiple etiologies of her nausea.  She was recently diagnosed with irritable bowel syndrome but she is at very high risk for gastroparesis I recommend following up with Gastroenterology for further evaluation of this possibility.  Also she suffers from chronic pain which may be exacerbating her diabetes and the Victoza may be exacerbating the nausea or even causing it    Will add glimepiride 2 mg, went over sick day management with drinking of clear fluids with carbohydrates since she requires multiple diabetic medications and will have her stop Victoza to see if the nausea improves and if does are improved a little then she will start on Bydureon once a week which tends to cause less nausea than Victoza    She will let me know how her sugars are doing in  a couple weeks and can plan to increase dose of glimepiride for further management    Because of her multiple comorbidities and uncontrolled diabetes and morbid obesity she is definitely a candidate for weight loss surgery and she states this week she will be looking in to who she would like to see about this      Plan:   Type 2 diabetes, uncontrolled, with neuropathy    Essential hypertension    Elevated liver enzymes    Fatty liver    Nausea    Diarrhea, unspecified type    Other orders  -     glimepiride (AMARYL) 2 MG tablet; Take 1 tablet (2 mg total) by mouth before breakfast.  Dispense: 30 tablet; Refill: 3  -     exenatide microspheres (BYDUREON) 2 mg/0.65 mL PnIj; Inject 2 mg into the skin every 7 days.  Dispense: 4 each; Refill: 3          No follow-ups on file.    Labs prior to appointment? not applicable     Disclaimer:  This note may have been partially prepared using voice recognition software and  it may have not been extensively proofed, as such there could be errors within the text such as sound alike errors.

## 2020-08-04 ENCOUNTER — TELEPHONE (OUTPATIENT)
Dept: INTERNAL MEDICINE | Facility: CLINIC | Age: 50
End: 2020-08-04

## 2020-08-04 NOTE — TELEPHONE ENCOUNTER
----- Message from Roopa Laviniachandler sent at 8/4/2020  4:17 PM CDT -----  Contact: Pt [phone]  Patient is requesting a call asap to get tested for covid. Her daughter just tested positive and she is currently in the hospital.    Please call and advise.    Thank You

## 2020-08-04 NOTE — TELEPHONE ENCOUNTER
Spoke with pt regarding getting tested for Covid .Informed pt that she can be tested at a community site.Pt stated that she don't see why she have to go to a community site if she has a doctor [facility].Informed patient to self quarantine.Patient states that she understand.//LB

## 2020-08-07 ENCOUNTER — TELEPHONE (OUTPATIENT)
Dept: ENDOCRINOLOGY | Facility: CLINIC | Age: 50
End: 2020-08-07

## 2020-08-07 NOTE — TELEPHONE ENCOUNTER
----- Message from Tomasa Nation sent at 8/7/2020  9:54 AM CDT -----  Contact: Yolanda Guerrero would like someone to look over her blood sugar level she stated that per Obar it is running high, Please call her at [phone], also she stated that she has been expose to the COVID19.    Thanks  Td

## 2020-08-07 NOTE — TELEPHONE ENCOUNTER
Returned phone call to patient regarding her glucose levels and her request to change her glimepiride. Message has been sent to

## 2020-08-10 ENCOUNTER — PATIENT OUTREACH (OUTPATIENT)
Dept: ADMINISTRATIVE | Facility: OTHER | Age: 50
End: 2020-08-10

## 2020-08-11 ENCOUNTER — TELEPHONE (OUTPATIENT)
Dept: ENDOCRINOLOGY | Facility: CLINIC | Age: 50
End: 2020-08-11

## 2020-08-11 DIAGNOSIS — Z79.4 TYPE 2 DIABETES MELLITUS WITH OTHER SPECIFIED COMPLICATION, WITH LONG-TERM CURRENT USE OF INSULIN: ICD-10-CM

## 2020-08-11 DIAGNOSIS — I10 ESSENTIAL HYPERTENSION: Chronic | ICD-10-CM

## 2020-08-11 DIAGNOSIS — E11.69 TYPE 2 DIABETES MELLITUS WITH OTHER SPECIFIED COMPLICATION, WITH LONG-TERM CURRENT USE OF INSULIN: ICD-10-CM

## 2020-08-11 DIAGNOSIS — E26.9 HYPERALDOSTERONISM: ICD-10-CM

## 2020-08-11 RX ORDER — SPIRONOLACTONE 25 MG/1
25 TABLET ORAL DAILY
Qty: 30 TABLET | Refills: 6 | Status: SHIPPED | OUTPATIENT
Start: 2020-08-11 | End: 2021-04-26 | Stop reason: SDUPTHER

## 2020-08-11 RX ORDER — GLIMEPIRIDE 2 MG/1
4 TABLET ORAL
Qty: 60 TABLET | Refills: 3 | Status: SHIPPED | OUTPATIENT
Start: 2020-08-11 | End: 2020-11-25

## 2020-08-11 NOTE — TELEPHONE ENCOUNTER
----- Message from Malka Severino MD sent at 8/11/2020  4:06 PM CDT -----  Contact: Yolanda  Her sugars are still high.  So I will increase her glimepiride to a total of 4 mg a day and please confirm to see if she also has started bydureon in place of the Victoza      She currently should have the 2 mg dose of glimepiride so she can take 2 at a time before breakfast and I will adjust her prescription  ----- Message -----  From: Elaine Pfeiffer MA  Sent: 8/7/2020   4:13 PM CDT  To: Malka Severino MD    Please review pt's blood sugar. Pt also states she has been exposed to COVID.   Pt wants to know if you want to change the dosage of glimepiride    Please advise   ----- Message -----  From: Tomasa Nation  Sent: 8/7/2020   9:54 AM CDT  To: [facility] Staff    Yolanda would like someone to look over her blood sugar level she stated that per Obar it is running high, Please call her at [phone], also she stated that she has been expose to the COVID19.    Thanks  Td

## 2020-08-11 NOTE — PROGRESS NOTES
Requested updates within Care Everywhere.  Patient's chart was reviewed for overdue RAJINDER topics.  Eye exam scheduled 10/14/20.

## 2020-08-12 ENCOUNTER — PATIENT OUTREACH (OUTPATIENT)
Dept: OTHER | Facility: OTHER | Age: 50
End: 2020-08-12

## 2020-08-12 DIAGNOSIS — I15.2 HYPERTENSION COMPLICATING DIABETES: Chronic | ICD-10-CM

## 2020-08-12 DIAGNOSIS — Z79.4 TYPE 2 DIABETES MELLITUS WITH OTHER SPECIFIED COMPLICATION, WITH LONG-TERM CURRENT USE OF INSULIN: Primary | ICD-10-CM

## 2020-08-12 DIAGNOSIS — E11.59 HYPERTENSION COMPLICATING DIABETES: Chronic | ICD-10-CM

## 2020-08-12 DIAGNOSIS — E11.69 TYPE 2 DIABETES MELLITUS WITH OTHER SPECIFIED COMPLICATION, WITH LONG-TERM CURRENT USE OF INSULIN: Primary | ICD-10-CM

## 2020-08-12 RX ORDER — INSULIN GLARGINE 300 U/ML
INJECTION, SOLUTION SUBCUTANEOUS
Qty: 15 ML | Refills: 3 | Status: SHIPPED | OUTPATIENT
Start: 2020-08-12 | End: 2020-09-16 | Stop reason: SDUPTHER

## 2020-08-12 RX ORDER — INSULIN ASPART 100 [IU]/ML
INJECTION, SOLUTION INTRAVENOUS; SUBCUTANEOUS
Qty: 15 ML | Refills: 3 | Status: SHIPPED | OUTPATIENT
Start: 2020-08-12 | End: 2020-09-16 | Stop reason: SDUPTHER

## 2020-08-12 RX ORDER — AMLODIPINE BESYLATE 5 MG/1
5 TABLET ORAL NIGHTLY
Qty: 30 TABLET | Refills: 11 | Status: SHIPPED | OUTPATIENT
Start: 2020-08-12 | End: 2020-12-14

## 2020-08-12 RX ORDER — LIRAGLUTIDE 6 MG/ML
INJECTION SUBCUTANEOUS
Qty: 9 ML | Refills: 3 | Status: CANCELLED | OUTPATIENT
Start: 2020-08-12

## 2020-08-12 RX ORDER — METFORMIN HYDROCHLORIDE 1000 MG/1
1000 TABLET ORAL 2 TIMES DAILY WITH MEALS
Qty: 180 TABLET | Refills: 4 | Status: SHIPPED | OUTPATIENT
Start: 2020-08-12 | End: 2021-09-13 | Stop reason: SDUPTHER

## 2020-08-12 NOTE — TELEPHONE ENCOUNTER
----- Message from Carrillo Caruso sent at 8/12/2020  8:51 AM CDT -----  Contact: self  Would like to consult with nurse regarding RX.  Pt states since the dosage has been increased she is running out of medication and may need additional medication.  Please contact Yolanda Betts @ [phone].  Thanks

## 2020-08-12 NOTE — TELEPHONE ENCOUNTER
REFILL APPROVED    Medications Ordered This Encounter   Medications    metFORMIN (GLUCOPHAGE) 1000 MG tablet     Sig: Take 1 tablet (1,000 mg total) by mouth 2 (two) times daily with meals.     Dispense:  180 tablet     Refill:  4

## 2020-08-12 NOTE — PROGRESS NOTES
[facility] is actively being managed by endo at this time, will not intercede.    BP is above goal of <130/80. Medication compliance report reviewed, patient is reported as 78% compliant with hydralazine. Will plan to discuss compliance with next outreach.      Deferring call while patient is actively managed to not confuse or override.     Last 5 Patient Entered Readings                                      Current 30 Day Average: 140/91     Recent Readings 8/10/2020 7/29/2020 7/29/2020 7/29/2020 7/28/2020    SBP (mmHg) 137 145 116 158 138    DBP (mmHg) 87 96 79 101 87    Pulse 91 95 89 116 127        Hypertension Medications             amLODIPine (NORVASC) 10 MG tablet Take 1 tablet (10 mg total) by mouth every evening.    furosemide (LASIX) 20 MG tablet Take 1 tablet (20 mg total) by mouth once daily.    hydrALAZINE (APRESOLINE) 10 MG tablet Take 1 tablet (10 mg total) by mouth every 12 (twelve) hours.    metoprolol succinate (TOPROL-XL) 100 MG 24 hr tablet Take 2 tablets (200 mg total) by mouth every evening.    spironolactone (ALDACTONE) 25 MG tablet Take 1 tablet (25 mg total) by mouth once daily.    valsartan (DIOVAN) 320 MG tablet Take 1 tablet (320 mg total) by mouth once daily.        Last 6 Patient Entered Readings                                          Most Recent A1c: 11.6% on 7/7/2020  (Goal: 7%)     Recent Readings 8/12/2020 8/11/2020 8/11/2020 8/11/2020 8/11/2020    Blood Glucose (mg/dL) 203 313 362 260 377        Diabetes Medications             exenatide microspheres (BYDUREON) 2 mg/0.65 mL PnIj Inject 2 mg into the skin every 7 days.    glimepiride (AMARYL) 2 MG tablet Take 2 tablets (4 mg total) by mouth before breakfast.    insulin aspart U-100 (NOVOLOG FLEXPEN U-100 INSULIN) 100 unit/mL (3 mL) InPn pen Inject before meals three times a day up to 30 units daily    metFORMIN (GLUCOPHAGE) 1000 MG tablet Take 1 tablet (1,000 mg total) by mouth 2 (two) times daily with meals.    TOUJEO MAX U-300  SOLOSTAR 300 unit/mL (3 mL) InPn Inject 90 units once daily at bedtime

## 2020-08-14 ENCOUNTER — TELEPHONE (OUTPATIENT)
Dept: OTOLARYNGOLOGY | Facility: CLINIC | Age: 50
End: 2020-08-14

## 2020-08-18 ENCOUNTER — HOSPITAL ENCOUNTER (OUTPATIENT)
Dept: RADIOLOGY | Facility: HOSPITAL | Age: 50
Discharge: HOME | End: 2020-08-18
Attending: FAMILY MEDICINE
Payer: MEDICAID

## 2020-08-18 ENCOUNTER — OFFICE VISIT (OUTPATIENT)
Dept: INTERNAL MEDICINE | Facility: CLINIC | Age: 50
End: 2020-08-18
Payer: MEDICAID

## 2020-08-18 ENCOUNTER — TELEPHONE (OUTPATIENT)
Dept: ORTHOPEDICS | Facility: CLINIC | Age: 50
End: 2020-08-18

## 2020-08-18 ENCOUNTER — CLINICAL SUPPORT (OUTPATIENT)
Dept: DIABETES | Facility: CLINIC | Age: 50
End: 2020-08-18
Payer: MEDICAID

## 2020-08-18 VITALS
TEMPERATURE: 98 F | WEIGHT: 206.13 LBS | BODY MASS INDEX: 46.37 KG/M2 | BODY MASS INDEX: 46.37 KG/M2 | HEIGHT: 56 IN | OXYGEN SATURATION: 95 % | HEIGHT: 56 IN | WEIGHT: 206.13 LBS | DIASTOLIC BLOOD PRESSURE: 60 MMHG | HEART RATE: 95 BPM | SYSTOLIC BLOOD PRESSURE: 100 MMHG

## 2020-08-18 DIAGNOSIS — G89.29 CHRONIC NECK AND BACK PAIN: ICD-10-CM

## 2020-08-18 DIAGNOSIS — Z79.4 TYPE 2 DIABETES MELLITUS WITH HYPERGLYCEMIA, WITH LONG-TERM CURRENT USE OF INSULIN: Chronic | ICD-10-CM

## 2020-08-18 DIAGNOSIS — E66.01 MORBID OBESITY WITH BMI OF 45.0-49.9, ADULT: Chronic | ICD-10-CM

## 2020-08-18 DIAGNOSIS — M77.8 RIGHT SHOULDER TENDONITIS: Chronic | ICD-10-CM

## 2020-08-18 DIAGNOSIS — M54.2 CHRONIC NECK AND BACK PAIN: ICD-10-CM

## 2020-08-18 DIAGNOSIS — M54.9 DORSALGIA, UNSPECIFIED: ICD-10-CM

## 2020-08-18 DIAGNOSIS — M54.9 CHRONIC NECK AND BACK PAIN: ICD-10-CM

## 2020-08-18 DIAGNOSIS — E78.5 DYSLIPIDEMIA ASSOCIATED WITH TYPE 2 DIABETES MELLITUS: Primary | Chronic | ICD-10-CM

## 2020-08-18 DIAGNOSIS — E11.59 HYPERTENSION COMPLICATING DIABETES: Chronic | ICD-10-CM

## 2020-08-18 DIAGNOSIS — E11.65 UNCONTROLLED TYPE 2 DIABETES MELLITUS WITH HYPERGLYCEMIA: Primary | ICD-10-CM

## 2020-08-18 DIAGNOSIS — M79.7 FIBROMYALGIA: Chronic | ICD-10-CM

## 2020-08-18 DIAGNOSIS — G47.36 NOCTURNAL HYPOXEMIA DUE TO OBESITY: Chronic | ICD-10-CM

## 2020-08-18 DIAGNOSIS — E11.69 DYSLIPIDEMIA ASSOCIATED WITH TYPE 2 DIABETES MELLITUS: Primary | Chronic | ICD-10-CM

## 2020-08-18 DIAGNOSIS — I15.2 HYPERTENSION COMPLICATING DIABETES: Chronic | ICD-10-CM

## 2020-08-18 DIAGNOSIS — E11.65 TYPE 2 DIABETES MELLITUS WITH HYPERGLYCEMIA, WITH LONG-TERM CURRENT USE OF INSULIN: Chronic | ICD-10-CM

## 2020-08-18 DIAGNOSIS — E66.2 OBESITY HYPOVENTILATION SYNDROME: ICD-10-CM

## 2020-08-18 DIAGNOSIS — E66.9 NOCTURNAL HYPOXEMIA DUE TO OBESITY: Chronic | ICD-10-CM

## 2020-08-18 PROCEDURE — 72040 X-RAY EXAM NECK SPINE 2-3 VW: CPT | Mod: 26,,, | Performed by: RADIOLOGY

## 2020-08-18 PROCEDURE — 72100 X-RAY EXAM L-S SPINE 2/3 VWS: CPT | Mod: TC

## 2020-08-18 PROCEDURE — 99999 PR PBB SHADOW E&M-EST. PATIENT-LVL V: CPT | Mod: PBBFAC,,, | Performed by: DIETITIAN, REGISTERED

## 2020-08-18 PROCEDURE — 99999 PR PBB SHADOW E&M-EST. PATIENT-LVL V: ICD-10-PCS | Mod: PBBFAC,,, | Performed by: DIETITIAN, REGISTERED

## 2020-08-18 PROCEDURE — 72040 X-RAY EXAM NECK SPINE 2-3 VW: CPT | Mod: TC

## 2020-08-18 PROCEDURE — 99214 OFFICE O/P EST MOD 30 MIN: CPT | Mod: S$PBB,,, | Performed by: FAMILY MEDICINE

## 2020-08-18 PROCEDURE — 72100 X-RAY EXAM L-S SPINE 2/3 VWS: CPT | Mod: 26,,, | Performed by: RADIOLOGY

## 2020-08-18 PROCEDURE — 99214 PR OFFICE/OUTPT VISIT, EST, LEVL IV, 30-39 MIN: ICD-10-PCS | Mod: S$PBB,,, | Performed by: FAMILY MEDICINE

## 2020-08-18 PROCEDURE — 72100 XR LUMBAR SPINE 2 OR 3 VIEWS: ICD-10-PCS | Mod: 26,,, | Performed by: RADIOLOGY

## 2020-08-18 PROCEDURE — 99215 OFFICE O/P EST HI 40 MIN: CPT | Mod: PBBFAC | Performed by: DIETITIAN, REGISTERED

## 2020-08-18 PROCEDURE — 99215 OFFICE O/P EST HI 40 MIN: CPT | Mod: PBBFAC,27 | Performed by: FAMILY MEDICINE

## 2020-08-18 PROCEDURE — G0108 DIAB MANAGE TRN  PER INDIV: HCPCS | Mod: PBBFAC | Performed by: DIETITIAN, REGISTERED

## 2020-08-18 PROCEDURE — 99999 PR PBB SHADOW E&M-EST. PATIENT-LVL V: CPT | Mod: PBBFAC,,, | Performed by: FAMILY MEDICINE

## 2020-08-18 PROCEDURE — 72040 XR CERVICAL SPINE 2 OR 3 VIEWS: ICD-10-PCS | Mod: 26,,, | Performed by: RADIOLOGY

## 2020-08-18 PROCEDURE — 99999 PR PBB SHADOW E&M-EST. PATIENT-LVL V: ICD-10-PCS | Mod: PBBFAC,,, | Performed by: FAMILY MEDICINE

## 2020-08-18 NOTE — Clinical Note
Bebeto Severino, I met w/ pt today. Diabetes meds: Metformin 1000mg 1tab twice daily; bydureon 2mg wkly; toujeo max 96units daily; novolog ac 40units (pt takes three times daily w/ meals); amaryl 4mg 2tab am daily.     Pt reports Dexcom not covered by insurance. Digital records reviewed - am/pm readings running 132, 180 most 200-360 range. Due to hyperglycemia, instructed to increase Toujeo 100units daily. Please advise if you agree or desire additional changes. Pt requesting updated Toujeo Rx due to increased dose. Thank you, Yara

## 2020-08-18 NOTE — ASSESSMENT & PLAN NOTE
BP Readings from Last 6 Encounters:   08/18/20 100/60   07/28/20 136/78   07/20/20 132/82   07/14/20 (!) 131/91   07/10/20 (!) 141/90   07/09/20 120/72     Last 5 Patient Entered Readings                                      Current 30 Day Average: 144/93     Recent Readings 8/10/2020 7/29/2020 7/29/2020 7/29/2020 7/28/2020    SBP (mmHg) 137 145 116 158 138    DBP (mmHg) 87 96 79 101 87    Pulse 91 95 89 116 127      Controlled, stable.   
Chronic (several years). May be related to fibromyalgia. She wants pain management consult.  
Chronic pain > 1 year.  
Diabetes Management Status    Statin: Taking  ACE/ARB: Taking    Screening or Prevention Patient's value Goal Complete/Controlled?   HgA1C Testing and Control   Lab Results   Component Value Date    HGBA1C 11.6 (H) 07/07/2020      Annually/Less than 8% No   Lipid profile : 07/07/2020 Annually Yes   LDL control Lab Results   Component Value Date    LDLCALC Invalid, Trig>400.0 07/07/2020    Annually/Less than 100 mg/dl  Yes   Nephropathy screening Lab Results   Component Value Date    LABMICR 48.0 07/07/2020     Lab Results   Component Value Date    PROTEINUA Negative 07/20/2020    Annually Yes   Blood pressure BP Readings from Last 1 Encounters:   08/18/20 100/60    Less than 140/90 Yes   Dilated retinal exam : 05/22/2019 Annually Yes   Foot exam   : 07/14/2020 Annually Yes     Lab Results   Component Value Date    HGBA1C 11.6 (H) 07/07/2020    HGBA1C 7.1 (H) 02/18/2020    HGBA1C 9.9 (H) 11/18/2019    ESTGFRAFRICA >60 07/28/2020    EGFRNONAA >60 07/28/2020    MICALBCREAT 65.8 (H) 07/07/2020    LDLCALC Invalid, Trig>400.0 07/07/2020     Last 6 Patient Entered Readings                                          Most Recent A1c: 11.6% on 7/7/2020  (Goal: 7%)     Recent Readings 8/18/2020 8/17/2020 8/17/2020 8/17/2020 8/17/2020    Blood Glucose (mg/dL) 204 220 132 184 312           HEALTH MAINTENANCE: Diabetic health maintenance interventions reviewed and are up to date except for:  There are no preventive care reminders to display for this patient.    Uncontrolled, but improving with medication adjustments by endocrinology. Following with diabetes clinic. Discussed medication change options.  Future Appointments   Date Time Provider Department Center   9/15/2020  7:30 AM Yara Sanchez RD, CDE [facility] [place]   10/29/2020  7:30 AM Ayush Palomares PA-C [facility]      
Lab Results   Component Value Date    CHOL 115 (L) 07/07/2020    CHOL 126 02/18/2020    TRIG 547 (H) 07/07/2020    TRIG 152 (H) 02/18/2020    HDL 31 (L) 07/07/2020    HDL 46 02/18/2020    LDLCALC Invalid, Trig>400.0 07/07/2020    LDLCALC 49.6 (L) 02/18/2020    NONHDLCHOL 84 07/07/2020    NONHDLCHOL 80 02/18/2020    AST 83 (H) 07/28/2020    ALT 56 (H) 07/28/2020   She appears to be tolerating statin for dyslipidemia without apparent symptoms of myositis or hepatic dysfunction. Following with endocrinology.  
Wt Readings from Last 6 Encounters:   08/18/20 93.5 kg (206 lb 2.1 oz)   08/18/20 93.5 kg (206 lb 2.1 oz)   07/28/20 92 kg (202 lb 11.4 oz)   07/20/20 94.3 kg (207 lb 14.3 oz)   07/14/20 94.1 kg (207 lb 7.3 oz)   07/10/20 94.2 kg (207 lb 10.8 oz)     BMI Readings from Last 2 Encounters:   08/18/20 46.21 kg/m²   08/18/20 46.21 kg/m²     Reasonably stable. Therapeutic lifestyle changes encouraged.    
Left LE/Right LE

## 2020-08-18 NOTE — PROGRESS NOTES
CHIEF COMPLAINT  Follow-up      DIAGNOSES, HISTORY, ASSESSMENT, AND PLAN  Assessment   1. Dyslipidemia associated with type 2 diabetes mellitus    2. Hypertension complicating diabetes    3. Morbid obesity with BMI of 45.0-49.9, adult    4. Type 2 diabetes mellitus with hyperglycemia, with long-term current use of insulin    5. Right shoulder tendonitis    6. Dorsalgia, unspecified    7. Chronic neck and back pain    8. Fibromyalgia         Orders Placed This Encounter    X-Ray Cervical Spine 2 or 3 Views    X-Ray Lumbar Spine 2 Or 3 Views    Ambulatory referral/consult to Orthopedics    Ambulatory referral/consult to Pain Clinic       Except as noted herein, any chronic conditions are compensated/controlled and stable, and no other significant new complaints or concerns reported.     Plan   Problem List Items Addressed This Visit     Type 2 diabetes mellitus with hyperglycemia, with long-term current use of insulin (Chronic)    Current Assessment & Plan     Diabetes Management Status    Statin: Taking  ACE/ARB: Taking    Screening or Prevention Patient's value Goal Complete/Controlled?   HgA1C Testing and Control   Lab Results   Component Value Date    HGBA1C 11.6 (H) 07/07/2020      Annually/Less than 8% No   Lipid profile : 07/07/2020 Annually Yes   LDL control Lab Results   Component Value Date    LDLCALC Invalid, Trig>400.0 07/07/2020    Annually/Less than 100 mg/dl  Yes   Nephropathy screening Lab Results   Component Value Date    LABMICR 48.0 07/07/2020     Lab Results   Component Value Date    PROTEINUA Negative 07/20/2020    Annually Yes   Blood pressure BP Readings from Last 1 Encounters:   08/18/20 100/60    Less than 140/90 Yes   Dilated retinal exam : 05/22/2019 Annually Yes   Foot exam   : 07/14/2020 Annually Yes     Lab Results   Component Value Date    HGBA1C 11.6 (H) 07/07/2020    HGBA1C 7.1 (H) 02/18/2020    HGBA1C 9.9 (H) 11/18/2019    ESTGFRAFRICA >60 07/28/2020    EGFRNONAA >60 07/28/2020     MICALBCREAT 65.8 (H) 07/07/2020    LDLCALC Invalid, Trig>400.0 07/07/2020     Last 6 Patient Entered Readings                                          Most Recent A1c: 11.6% on 7/7/2020  (Goal: 7%)     Recent Readings 8/18/2020 8/17/2020 8/17/2020 8/17/2020 8/17/2020    Blood Glucose (mg/dL) 204 220 132 184 312           HEALTH MAINTENANCE: Diabetic health maintenance interventions reviewed and are up to date except for:  There are no preventive care reminders to display for this patient.    Uncontrolled, but improving with medication adjustments by endocrinology. Following with diabetes clinic. Discussed medication change options.  Future Appointments   Date Time Provider Department Center   9/15/2020  7:30 AM Yara Sanchez RD, CDE [facility] [place]   10/29/2020  7:30 AM Ayush Palomares PA-C [facility] [place]             Fibromyalgia (Chronic)    Relevant Orders    Ambulatory referral/consult to Pain Clinic    Hypertension complicating diabetes (Chronic)    Current Assessment & Plan     BP Readings from Last 6 Encounters:   08/18/20 100/60   07/28/20 136/78   07/20/20 132/82   07/14/20 (!) 131/91   07/10/20 (!) 141/90   07/09/20 120/72     Last 5 Patient Entered Readings                                      Current 30 Day Average: 144/93     Recent Readings 8/10/2020 7/29/2020 7/29/2020 7/29/2020 7/28/2020    SBP (mmHg) 137 145 116 158 138    DBP (mmHg) 87 96 79 101 87    Pulse 91 95 89 116 127      Controlled, stable.          Dyslipidemia associated with type 2 diabetes mellitus - Primary (Chronic)    Current Assessment & Plan     Lab Results   Component Value Date    CHOL 115 (L) 07/07/2020    CHOL 126 02/18/2020    TRIG 547 (H) 07/07/2020    TRIG 152 (H) 02/18/2020    HDL 31 (L) 07/07/2020    HDL 46 02/18/2020    LDLCALC Invalid, Trig>400.0 07/07/2020    LDLCALC 49.6 (L) 02/18/2020    NONHDLCHOL 84 07/07/2020    NONHDLCHOL 80 02/18/2020    AST 83 (H) 07/28/2020    ALT 56 (H) 07/28/2020    She appears to be tolerating statin for dyslipidemia without apparent symptoms of myositis or hepatic dysfunction. Following with endocrinology.         Morbid obesity with BMI of 45.0-49.9, adult (Chronic)    Current Assessment & Plan     Wt Readings from Last 6 Encounters:   08/18/20 93.5 kg (206 lb 2.1 oz)   08/18/20 93.5 kg (206 lb 2.1 oz)   07/28/20 92 kg (202 lb 11.4 oz)   07/20/20 94.3 kg (207 lb 14.3 oz)   07/14/20 94.1 kg (207 lb 7.3 oz)   07/10/20 94.2 kg (207 lb 10.8 oz)     BMI Readings from Last 2 Encounters:   08/18/20 46.21 kg/m²   08/18/20 46.21 kg/m²     Reasonably stable. Therapeutic lifestyle changes encouraged.           Right shoulder tendonitis (Chronic)    Overview     MRI SHOULDER 7/2020  1. Supraspinatus/infraspinatus tendinosis with possible short-segment intrasubstance interstitial tearing.  2. Suspected mild subacromial/subdeltoid bursitis.  Small amount of subcoracoid bursal fluid as well.  3. AC joint degenerative hypertrophy findings.         Current Assessment & Plan     Chronic pain > 1 year.         Relevant Orders    Ambulatory referral/consult to Orthopedics    Chronic neck and back pain    Current Assessment & Plan     Chronic (several years). May be related to fibromyalgia. She wants pain management consult.         Relevant Orders    X-Ray Cervical Spine 2 or 3 Views    X-Ray Lumbar Spine 2 Or 3 Views    Ambulatory referral/consult to Pain Clinic      Other Visit Diagnoses     Dorsalgia, unspecified        Relevant Orders    X-Ray Lumbar Spine 2 Or 3 Views          Outpatient Medications Prior to Visit   Medication Sig Dispense Refill    albuterol (PROVENTIL) 2.5 mg /3 mL (0.083 %) nebulizer solution Take 3 mLs (2.5 mg total) by nebulization every 4 (four) hours. 180 mL 11    amLODIPine (NORVASC) 10 MG tablet Take 1 tablet (10 mg total) by mouth once daily. 30 tablet 3    amLODIPine (NORVASC) 10 MG tablet Take 1 tablet (10 mg total) by mouth every evening. (Patient taking  "differently: Take 5 mg by mouth every evening. ) 30 tablet 11    amLODIPine (NORVASC) 5 MG tablet Take 1 tablet (5 mg total) by mouth every evening. 30 tablet 11    ammonium lactate 12 % Crea Apply 1 Application topically 2 (two) times daily. 140 g 3    ARIPiprazole (ABILIFY) 10 MG Tab Take 1 tablet (10 mg total) by mouth once daily. 30 tablet 2    aspirin (ECOTRIN) 81 MG EC tablet Take 81 mg by mouth once daily.      atorvastatin (LIPITOR) 20 MG tablet Take 1 tablet (20 mg total) by mouth every evening. 30 tablet 1    BD INSULIN SYRINGE ULTRA-FINE 1/2 mL 30 gauge x 1/2" Syrg   0    benztropine (COGENTIN) 1 MG tablet Take 1 tablet (1 mg total) by mouth every evening. 30 tablet 1    blood sugar diagnostic Strp Check blood glucose 4 times daily as directed and as needed (dispense insurance preferred brand or patient choice) 200 each 5    blood-glucose meter Misc Use to check blood sugars, give meter based on insurance specification 1 each 1    cyclobenzaprine (FLEXERIL) 10 MG tablet Take 1 tablet (10 mg total) by mouth daily as needed (for back muscle spasms). 30 tablet 2    cyclosporine 0.05 % Drop Place 1 drop into both eyes 2 (two) times daily. 5.5 mL 11    DULoxetine (CYMBALTA) 60 MG capsule Take 1 capsule (60 mg total) by mouth 2 (two) times daily. 60 capsule 1    ergocalciferol (ERGOCALCIFEROL) 50,000 unit Cap Take 1 capsule (50,000 Units total) by mouth every 14 (fourteen) days. 4 capsule 5    exenatide microspheres (BYDUREON) 2 mg/0.65 mL PnIj Inject 2 mg into the skin every 7 days. 4 each 3    fish oil-omega-3 fatty acids 300-1,000 mg capsule Take 1 capsule by mouth once daily.      furosemide (LASIX) 20 MG tablet Take 1 tablet (20 mg total) by mouth once daily. 30 tablet 11    gabapentin (NEURONTIN) 300 MG capsule Take 2 capsules (600 mg total) by mouth 3 (three) times daily. 90 capsule 3    glimepiride (AMARYL) 2 MG tablet Take 2 tablets (4 mg total) by mouth before breakfast. 60 tablet 3 " "   hydrALAZINE (APRESOLINE) 10 MG tablet Take 1 tablet (10 mg total) by mouth every 12 (twelve) hours. 60 tablet 11    hydrOXYzine pamoate (VISTARIL) 25 MG Cap Take 1 capsule (25 mg total) by mouth 2 (two) times a day. 60 capsule 0    hydrOXYzine pamoate (VISTARIL) 25 MG Cap Take 1 capsule by mouth every day 30 capsule 0    insulin aspart U-100 (NOVOLOG FLEXPEN U-100 INSULIN) 100 unit/mL (3 mL) InPn pen Inject before meals three times a day up to 30 units daily 15 mL 3    lancets 28 gauge Misc use as directed 3 times daily 100 each 11    linaCLOtide (LINZESS) 145 mcg Cap capsule Take 1 capsule (145 mcg total) by mouth daily as needed (for constipation). 30 capsule 11    lurasidone (LATUDA) 120 mg Tab Take 1 tablet by mouth everyday at 5 pm with food 30 tablet 0    magnesium oxide (MAG-OX) 400 mg (241.3 mg magnesium) tablet Take 1 tablet (400 mg total) by mouth once daily. 30 tablet 3    metFORMIN (GLUCOPHAGE) 1000 MG tablet Take 1 tablet (1,000 mg total) by mouth 2 (two) times daily with meals. 180 tablet 4    metoprolol succinate (TOPROL-XL) 100 MG 24 hr tablet Take 2 tablets (200 mg total) by mouth every evening. 180 tablet 3    mirtazapine (REMERON) 45 MG tablet Take 1 tablet by mouth every evevning 30 tablet 0    mometasone (ASMANEX TWISTHALER) 220 mcg/ actuation (120) AePB Inhale 2 puffs into the lungs 2 (two) times daily. Controller 1 each 11    MULTIVIT,CALC,MINS/IRON/FOLIC (DAILY MULTIPLE FOR WOMEN ORAL) Take 1 tablet by mouth once daily.      pen needle, diabetic (BD ULTRA-FINE MINI PEN NEEDLE) 31 gauge x 3/16" Ndle Use 5 a day 200 each 3    spironolactone (ALDACTONE) 25 MG tablet Take 1 tablet (25 mg total) by mouth once daily. 30 tablet 6    temazepam (RESTORIL) 15 mg Cap Take 1 capsule by mouth at bedtime 30 capsule 1    topiramate (TOPAMAX) 50 MG tablet Take 1 tablet (50 mg total) by mouth 2 (two) times daily. 60 tablet 11    TOUJEO MAX U-300 SOLOSTAR 300 unit/mL (3 mL) InPn Inject 96 " "units once daily at bedtime 15 mL 3    valACYclovir (VALTREX) 1000 MG tablet Take 1 tablet (1,000 mg total) by mouth once daily. 30 tablet 11    valsartan (DIOVAN) 320 MG tablet Take 1 tablet (320 mg total) by mouth once daily. 90 tablet 3    zolpidem (AMBIEN) 10 mg Tab Take 1 tablet by mouth at bedtime 30 tablet 0    ALPRAZolam (XANAX) 1 MG tablet Take 1 tablet by mouth twice daily (Patient not taking: Reported on 8/18/2020) 60 tablet 0    montelukast (SINGULAIR) 10 mg tablet Take 1 tablet (10 mg total) by mouth every evening. (Patient not taking: Reported on 8/18/2020) 30 tablet 11    promethazine (PHENERGAN) 25 MG tablet        No facility-administered medications prior to visit.         There are no discontinued medications.          Subjective   Review of Systems   Constitutional: Negative for chills and fever.   Respiratory: Negative for chest tightness and shortness of breath.    Endocrine: Negative for polydipsia and polyuria.        Objective   Vitals:    08/18/20 0827   BP: 100/60   BP Location: Right arm   Patient Position: Sitting   BP Method: Large (Manual)   Pulse: 95   Temp: 97.6 °F (36.4 °C)   TempSrc: Tympanic   SpO2: 95%   Weight: 93.5 kg (206 lb 2.1 oz)   Height: 4' 8" (1.422 m)     Physical Exam  Vitals signs reviewed.   Constitutional:       General: She is not in acute distress.     Appearance: Normal appearance. She is not ill-appearing, toxic-appearing or diaphoretic.   HENT:      Head: Normocephalic and atraumatic.   Eyes:      General: No scleral icterus.  Cardiovascular:      Rate and Rhythm: Normal rate.   Pulmonary:      Effort: Pulmonary effort is normal.   Neurological:      General: No focal deficit present.      Mental Status: She is alert and oriented to person, place, and time.   Psychiatric:         Mood and Affect: Mood normal.         Behavior: Behavior normal.         Judgment: Judgment normal.           Documentation entered by me for this encounter may have been done in " "part using speech-recognition technology. Although I have made an effort to ensure accuracy, "sound like" errors may exist and should be interpreted in context. -DOMINIK Beach MD.    "

## 2020-08-18 NOTE — PROGRESS NOTES
Diabetes Education  Author: Yara Sanchez RD, CDE  Date: 8/18/2020    Diabetes Care Management Summary  Diabetes Education Record Assessment/Progress: Comprehensive/Group(assessment 7/20/20)  Current Diabetes Risk Level: High     Last A1c:   Lab Results   Component Value Date    HGBA1C 11.6 (H) 07/07/2020     Diabetes Type  Diabetes Type : Type II    Diabetes History  Diabetes Diagnosis: >10 years(>20yrs)  Current Treatment: Diet, Exercise, Insulin, Oral Medication, Injectable(Metformin 1000mg 1tab twice daily; bydureon 2mg wkly; toujeo max 96units daily; novolog ac 40units (pt takes three times daily w/ meals); amaryl 4mg 2tab am daily)  Reviewed Problem List with Patient: Yes    Health Maintenance was reviewed today with patient. Discussed with patient importance of routine eye exams, foot exams/foot care, blood work (i.e.: A1c, microalbumin, and lipid), dental visits, yearly flu vaccine, and pneumonia vaccine as indicated by PCP. Patient verbalized understanding.     Health Maintenance Topics with due status: Not Due       Topic Last Completion Date    TETANUS VACCINE 12/21/2017    Colonoscopy 02/28/2018    Influenza Vaccine 10/01/2018    Lipid Panel 07/07/2020    Hemoglobin A1c 07/07/2020    Mammogram 07/10/2020    Foot Exam 07/14/2020     There are no preventive care reminders to display for this patient.    Nutrition  Meal Planning: (Intake ~3185-5538 cals/d. Pt limiting carb 30-45grams/meal, ~15 grams/snack (3-4x/d). No excess fat, sodium.)  Meal Plan 24 Hour Recall - Breakfast: oatmeal 1cup (few raisins) w/ scr eggs  Meal Plan 24 Hour Recall - Lunch: chix mcnuggets, no fries OR [place] pork buns (2-3 ea) OR crab stew w/ brn rice 1/2 c - water  Meal Plan 24 Hour Recall - Dinner: chix, mush, green beans w. 2 small potatoes - water  Meal Plan 24 Hour Recall - Snack: fruit (4 small daily); marco: water; dining out: 2x/mos    Monitoring   Self Monitoring : Pt reports Dexcom not covered by insurance. Digital  records reviewed - am/pm readings running 132, 180 most 200-360 range. Pt reports polydipsia, polyuria; denies hypoglycemia. She reports GI fu appt pending; tried probiotic supplement but no improvement GI symptoms (diarrhea/constipation).  Blood Glucose Logs: Yes  In the last month, how often have you had a low blood sugar reaction?: never    Exercise   Exercise Type: (Walking around apt complex ~20min 3d/wk)    Social History  Preferred Learning Method: Face to Face  Primary Support: Self  Smoking Status: Never a Smoker  Alcohol Use: Rarely      Barriers to Change  Barriers to Change: None  Learning Challenges : None    Readiness to Learn   Readiness to Learn : Eager    Cultural Influences  Cultural Influences: No    Diabetes Education Assessment/Progress  Diabetes Disease Process (diabetes disease process and treatment options): Discussion, Individual Session, Demonstrates Understanding/Competency(verbalizes/demonstrates)  Nutrition (Incorporating nutritional management into one's lifestyle): Discussion, Needs Review, Instructed, Individual Session, Demonstrates Understanding/Competency (verbalizes/demonstrates), Written Materials Provided  Physical Activity (incorporating physical activity into one's lifestyle): Discussion, Needs Review, Instructed, Individual Session, Demonstrates Understanding/Competency (verbalizes/demonstrates), Written Materials Provided  Medications (states correct name, dose, onset, peak, duration, side effects & timing of meds): Discussion, Needs Review, Instructed, Individual Session, Demonstrates Understanding/Competency(verbalizes/demonstrates), Written Materials Provided  Monitoring (monitoring blood glucose/other parameters & using results): Discussion, Needs Review, Instructed, Individual Session, Demonstrates Understanding/Competency (verbalizes/demonstrates), Written Materials Provided  Acute Complications (preventing, detecting, and treating acute complications): Discussion,  Individual Session, Demonstrates Understanding/Competency (verbalizes/demonstrates)  Chronic Complications (preventing, detecting, and treating chronic complications): Demonstrates Understanding/Competency (verbalizes/demonstrates)  Clinical (diabetes, other pertinent medical history, and relevant comorbidities reviewed during visit): Demonstrates Understanding/Competency (verbalizes/demonstrates)  Cognitive (knowledge of self-management skills, functional health literacy): Demonstrates Understanding/Competency (verbalizes/demonstrates)  Psychosocial (emotional response to diabetes): Not Covered/Deferred  Diabetes Distress and Support Systems: Not Covered/Deferred  Behavioral (readiness for change, lifestyle practices, self-care behaviors): Discussion, Needs Review, Instructed, Individual Session, Demonstrates Understanding/Competency (verbalizes/demonstrates)    Goals  Patient has selected/evaluated goals during today's session: Yes, evaluated  Healthy Eating: Set(to use meal plan - carb portions/spacing, rec seasonings, avoid fast foods)  Met Percentage : 75%  Start Date: 08/18/20(move fruit serv into meal instead of at snack so that Novolog can cover fruit BG spike; use 0-5gram carb snacks btw meals (reviewed examples))  Target Date: 09/15/20  Physical Activity: Set(150min/wk - chair exercises or exercise bands)  Met Percentage : 50%  Start Date: 08/18/20(in addition to walking program, add chair exercises and/or foldable bike (resources provided) daily)  Target Date: 09/15/20  Monitoring: Set(continue test BG 4x/d; consider Dexcom CGMS)  Met Percentage : 50%  Start Date: 08/18/20  Target Date: 09/15/20  Medications: Set(dose diabetes medications as directed)  Start Date: 08/18/20  Target Date: 09/15/20  Other: % Met((1tab daily) and fish oil supplement (1000mg 1 capsule twice daily) on GI health.)  Met Percentage : 100%     Diabetes Care Plan/Intervention  Education Plan/Intervention: Individual Follow-Up  DSMT([facility]/ Dr. Severino, digital diabetes, pcp for medical mgmt.) Due to hyperglycemia, instructed to increase Toujeo 100units daily; maintain current doses other diabetes medications. Will forward to Dr. Severino to further review. RTC ~4wks, prn, as referred.     Diabetes Meal Plan  Restrictions: Low Fat, Low Sodium, Restricted Carbohydrate  Calories: 1400  Carbohydrate Per Meal: 30-45g  Carbohydrate Per Snack : 7-15g    Today's Self-Management Care Plan was developed with the patient's input and is based on barriers identified during today's assessment.    The long and short-term goals in the care plan were written with the patient/caregiver's input. The patient has agreed to work toward these goals to improve her overall diabetes control.      The patient received a copy of today's self-management plan and verbalized understanding of the care plan, goals, and all of today's instructions.      The patient was encouraged to communicate with her physician and care team regarding her condition(s) and treatment.  I provided the patient with my contact information today and encouraged her to contact me via phone or patient portal as needed.     Education Units of Time   Time Spent: 60 min

## 2020-08-19 ENCOUNTER — OFFICE VISIT (OUTPATIENT)
Dept: SPORTS MEDICINE | Facility: CLINIC | Age: 50
End: 2020-08-19
Payer: MEDICAID

## 2020-08-19 ENCOUNTER — TELEPHONE (OUTPATIENT)
Dept: INTERNAL MEDICINE | Facility: CLINIC | Age: 50
End: 2020-08-19

## 2020-08-19 ENCOUNTER — TELEPHONE (OUTPATIENT)
Dept: OTOLARYNGOLOGY | Facility: CLINIC | Age: 50
End: 2020-08-19

## 2020-08-19 VITALS
WEIGHT: 206.13 LBS | BODY MASS INDEX: 46.37 KG/M2 | SYSTOLIC BLOOD PRESSURE: 130 MMHG | DIASTOLIC BLOOD PRESSURE: 80 MMHG | HEART RATE: 78 BPM | HEIGHT: 56 IN

## 2020-08-19 DIAGNOSIS — M77.8 RIGHT SHOULDER TENDONITIS: Chronic | ICD-10-CM

## 2020-08-19 DIAGNOSIS — M25.532 BILATERAL WRIST PAIN: ICD-10-CM

## 2020-08-19 DIAGNOSIS — M25.511 RIGHT SHOULDER PAIN, UNSPECIFIED CHRONICITY: Primary | ICD-10-CM

## 2020-08-19 DIAGNOSIS — G47.36 NOCTURNAL HYPOXEMIA DUE TO OBESITY: Primary | Chronic | ICD-10-CM

## 2020-08-19 DIAGNOSIS — M25.531 BILATERAL WRIST PAIN: ICD-10-CM

## 2020-08-19 DIAGNOSIS — E66.9 NOCTURNAL HYPOXEMIA DUE TO OBESITY: Primary | Chronic | ICD-10-CM

## 2020-08-19 DIAGNOSIS — E66.2 OBESITY HYPOVENTILATION SYNDROME: ICD-10-CM

## 2020-08-19 DIAGNOSIS — E11.42 DIABETIC POLYNEUROPATHY ASSOCIATED WITH TYPE 2 DIABETES MELLITUS: Primary | Chronic | ICD-10-CM

## 2020-08-19 PROCEDURE — 20610 LARGE JOINT ASPIRATION/INJECTION: R SUBACROMIAL BURSA: ICD-10-PCS | Mod: S$PBB,RT,, | Performed by: FAMILY MEDICINE

## 2020-08-19 PROCEDURE — 99214 PR OFFICE/OUTPT VISIT, EST, LEVL IV, 30-39 MIN: ICD-10-PCS | Mod: 25,S$PBB,, | Performed by: FAMILY MEDICINE

## 2020-08-19 PROCEDURE — 99999 PR PBB SHADOW E&M-EST. PATIENT-LVL IV: ICD-10-PCS | Mod: PBBFAC,,, | Performed by: FAMILY MEDICINE

## 2020-08-19 PROCEDURE — 99999 PR PBB SHADOW E&M-EST. PATIENT-LVL IV: CPT | Mod: PBBFAC,,, | Performed by: FAMILY MEDICINE

## 2020-08-19 PROCEDURE — 99214 OFFICE O/P EST MOD 30 MIN: CPT | Mod: 25,S$PBB,, | Performed by: FAMILY MEDICINE

## 2020-08-19 PROCEDURE — 99214 OFFICE O/P EST MOD 30 MIN: CPT | Mod: PBBFAC,PN | Performed by: FAMILY MEDICINE

## 2020-08-19 PROCEDURE — 20610 DRAIN/INJ JOINT/BURSA W/O US: CPT | Mod: PBBFAC,PN | Performed by: FAMILY MEDICINE

## 2020-08-19 RX ORDER — BETAMETHASONE SODIUM PHOSPHATE AND BETAMETHASONE ACETATE 3; 3 MG/ML; MG/ML
6 INJECTION, SUSPENSION INTRA-ARTICULAR; INTRALESIONAL; INTRAMUSCULAR; SOFT TISSUE
Status: DISCONTINUED | OUTPATIENT
Start: 2020-08-19 | End: 2020-08-19 | Stop reason: HOSPADM

## 2020-08-19 RX ADMIN — BETAMETHASONE ACETATE AND BETAMETHASONE SODIUM PHOSPHATE 6 MG: 3; 3 INJECTION, SUSPENSION INTRA-ARTICULAR; INTRALESIONAL; INTRAMUSCULAR; SOFT TISSUE at 08:08

## 2020-08-19 NOTE — PROGRESS NOTES
Subjective:     Patient ID: Yolanda Betts is a 50 y.o. female.    Chief Complaint: Pain of the Right Shoulder      HPI:  Patient states she has had pain and problems with the right shoulder for about 5 years.  Rheumatology has helped her with this in the past but she wanted to see if there is any other treatment she can use.    She recently had an MRI done which showed partial tearing of the infraspinatus and supraspinatus tendons as well as tendinopathy  and subdeltoid bursitis.    She has trouble lifting her arm up above chest height and difficulty with ADLs and pain with sleeping.  She has adverse effects with ibuprofen and meloxicam and has not found these type medications to be very helpful for her.  She is on gabapentin.    She tried physical therapy for a few visits in March but was cut short by the COVID virus.  She does have diabetes which was under per control a few months ago but she showed me her levels recently which have been between 150 and 189.    Past Medical History:   Diagnosis Date    Abnormal Pap smear of cervix     HPV genital warts    Anemia     Anxiety     Arthritis     Asthma 10/11/2016    Bipolar 1 disorder     Diabetes mellitus, type 2     Dyslipidemia associated with type 2 diabetes mellitus 5/13/2019    Genital warts     GERD (gastroesophageal reflux disease)     Herpes simplex virus (HSV) infection     Hyperlipidemia     Hypertension     Hypertension complicating diabetes 5/5/2019    Migraine with aura and without status migrainosus, not intractable 3/21/2016    Mild persistent asthma without complication 10/11/2016    Morbid obesity with body mass index (BMI) of 45.0 to 49.9 in adult 8/3/2017    Obstructive sleep apnea     ALEN (obstructive sleep apnea)     2L per N/C q HS    Schizoaffective disorder, bipolar type 4/25/2019    Seasonal allergic rhinitis due to pollen 5/13/2019    Type 2 diabetes mellitus with hyperglycemia, with long-term current use of  "insulin 2017    Type 2 diabetes mellitus with hyperglycemia, without long-term current use of insulin 2017     Past Surgical History:   Procedure Laterality Date    abcess removed right back      BELT ABDOMINOPLASTY      BREAST SURGERY Bilateral 1996    Reduction     SECTION      X 2    COLONOSCOPY      COLONOSCOPY N/A 2018    Procedure: colonoscopy for iron deficiency anemia;  Surgeon: Frankie Sanchez MD;  Location: [facility] ENDO;  Service: Endoscopy;  Laterality: N/A;    COLONOSCOPY N/A 2018    Procedure: COLONOSCOPY;  Surgeon: Frankie Sanchez MD;  Location: [facility] ENDO;  Service: Endoscopy;  Laterality: N/A;    HYSTERECTOMY      w/ BSO; hypermenorrhea    MOUTH SURGERY      OOPHORECTOMY      hyst/bso, hypermenorrhea    ROBOT-ASSISTED CHOLECYSTECTOMY USING DA DARI XI N/A 1/3/2020    Procedure: XI ROBOTIC CHOLECYSTECTOMY;  Surgeon: Damir Driscoll MD;  Location: [facility] OR;  Service: General;  Laterality: N/A;    TOTAL REDUCTION MAMMOPLASTY      TUBAL LIGATION       Family History   Problem Relation Age of Onset    Diabetes Mother     Hyperlipidemia Mother     Hypertension Mother     Asthma Mother     COPD Mother     Glaucoma Mother     Thyroid disease Mother     Anesthesia problems Mother         "almost had a cardiac arrest" , blood clots    Hypertension Father     Hyperlipidemia Father     Cancer Father         Brain, lung, liver, kidney    Heart disease Maternal Grandmother     Hyperlipidemia Maternal Grandmother     Hypertension Maternal Grandmother     Cataracts Maternal Grandmother     Diabetes Maternal Grandmother     Heart disease Maternal Grandfather     Hyperlipidemia Maternal Grandfather     Hypertension Maternal Grandfather     Glaucoma Maternal Grandfather     Cancer Maternal Grandfather     Cataracts Maternal Grandfather     Macular degeneration Maternal Grandfather     Diabetes Maternal Grandfather     Heart disease " Paternal Grandmother     Hyperlipidemia Paternal Grandmother     Hypertension Paternal Grandmother     Cataracts Paternal Grandmother     Heart disease Paternal Grandfather     Hyperlipidemia Paternal Grandfather     Hypertension Paternal Grandfather     Cataracts Paternal Grandfather     Breast cancer Maternal Cousin     Breast cancer Maternal Cousin     Breast cancer Maternal Cousin     Breast cancer Maternal Cousin      Social History     Socioeconomic History    Marital status: Single     Spouse name: Not on file    Number of children: Not on file    Years of education: Not on file    Highest education level: Not on file   Occupational History    Not on file   Social Needs    Financial resource strain: Not on file    Food insecurity     Worry: Not on file     Inability: Not on file    Transportation needs     Medical: Not on file     Non-medical: Not on file   Tobacco Use    Smoking status: Never Smoker    Smokeless tobacco: Never Used   Substance and Sexual Activity    Alcohol use: Not Currently     Alcohol/week: 0.0 standard drinks     Comment: socially  No alcohol 72h prior to sx    Drug use: No    Sexual activity: Not Currently     Partners: Male   Lifestyle    Physical activity     Days per week: Not on file     Minutes per session: Not on file    Stress: Not on file   Relationships    Social connections     Talks on phone: Not on file     Gets together: Not on file     Attends Nondenominational service: Not on file     Active member of club or organization: Not on file     Attends meetings of clubs or organizations: Not on file     Relationship status: Not on file   Other Topics Concern    Not on file   Social History Narrative    Long-term care nurse       Current Outpatient Medications:     albuterol (PROVENTIL) 2.5 mg /3 mL (0.083 %) nebulizer solution, Take 3 mLs (2.5 mg total) by nebulization every 4 (four) hours., Disp: 180 mL, Rfl: 11    ALPRAZolam (XANAX) 1 MG tablet, Take 1  "tablet by mouth twice daily, Disp: 60 tablet, Rfl: 0    amLODIPine (NORVASC) 10 MG tablet, Take 1 tablet (10 mg total) by mouth once daily., Disp: 30 tablet, Rfl: 3    amLODIPine (NORVASC) 10 MG tablet, Take 1 tablet (10 mg total) by mouth every evening. (Patient taking differently: Take 5 mg by mouth every evening. ), Disp: 30 tablet, Rfl: 11    amLODIPine (NORVASC) 5 MG tablet, Take 1 tablet (5 mg total) by mouth every evening., Disp: 30 tablet, Rfl: 11    ammonium lactate 12 % Crea, Apply 1 Application topically 2 (two) times daily., Disp: 140 g, Rfl: 3    ARIPiprazole (ABILIFY) 10 MG Tab, Take 1 tablet (10 mg total) by mouth once daily., Disp: 30 tablet, Rfl: 2    aspirin (ECOTRIN) 81 MG EC tablet, Take 81 mg by mouth once daily., Disp: , Rfl:     atorvastatin (LIPITOR) 20 MG tablet, Take 1 tablet (20 mg total) by mouth every evening., Disp: 30 tablet, Rfl: 1    BD INSULIN SYRINGE ULTRA-FINE 1/2 mL 30 gauge x 1/2" Syrg, , Disp: , Rfl: 0    benztropine (COGENTIN) 1 MG tablet, Take 1 tablet (1 mg total) by mouth every evening., Disp: 30 tablet, Rfl: 1    blood sugar diagnostic Strp, Check blood glucose 4 times daily as directed and as needed (dispense insurance preferred brand or patient choice), Disp: 200 each, Rfl: 5    blood-glucose meter Misc, Use to check blood sugars, give meter based on insurance specification, Disp: 1 each, Rfl: 1    cyclobenzaprine (FLEXERIL) 10 MG tablet, Take 1 tablet (10 mg total) by mouth daily as needed (for back muscle spasms)., Disp: 30 tablet, Rfl: 2    cyclosporine 0.05 % Drop, Place 1 drop into both eyes 2 (two) times daily., Disp: 5.5 mL, Rfl: 11    DULoxetine (CYMBALTA) 60 MG capsule, Take 1 capsule (60 mg total) by mouth 2 (two) times daily., Disp: 60 capsule, Rfl: 1    ergocalciferol (ERGOCALCIFEROL) 50,000 unit Cap, Take 1 capsule (50,000 Units total) by mouth every 14 (fourteen) days., Disp: 4 capsule, Rfl: 5    exenatide microspheres (BYDUREON) 2 mg/0.65 " mL PnIj, Inject 2 mg into the skin every 7 days., Disp: 4 each, Rfl: 3    fish oil-omega-3 fatty acids 300-1,000 mg capsule, Take 1 capsule by mouth once daily., Disp: , Rfl:     furosemide (LASIX) 20 MG tablet, Take 1 tablet (20 mg total) by mouth once daily., Disp: 30 tablet, Rfl: 11    gabapentin (NEURONTIN) 300 MG capsule, Take 2 capsules (600 mg total) by mouth 3 (three) times daily., Disp: 90 capsule, Rfl: 3    glimepiride (AMARYL) 2 MG tablet, Take 2 tablets (4 mg total) by mouth before breakfast., Disp: 60 tablet, Rfl: 3    hydrALAZINE (APRESOLINE) 10 MG tablet, Take 1 tablet (10 mg total) by mouth every 12 (twelve) hours., Disp: 60 tablet, Rfl: 11    hydrOXYzine pamoate (VISTARIL) 25 MG Cap, Take 1 capsule by mouth every day, Disp: 30 capsule, Rfl: 0    insulin aspart U-100 (NOVOLOG FLEXPEN U-100 INSULIN) 100 unit/mL (3 mL) InPn pen, Inject before meals three times a day up to 30 units daily, Disp: 15 mL, Rfl: 3    lancets 28 gauge Misc, use as directed 3 times daily, Disp: 100 each, Rfl: 11    linaCLOtide (LINZESS) 145 mcg Cap capsule, Take 1 capsule (145 mcg total) by mouth daily as needed (for constipation)., Disp: 30 capsule, Rfl: 11    lurasidone (LATUDA) 120 mg Tab, Take 1 tablet by mouth everyday at 5 pm with food, Disp: 30 tablet, Rfl: 0    magnesium oxide (MAG-OX) 400 mg (241.3 mg magnesium) tablet, Take 1 tablet (400 mg total) by mouth once daily., Disp: 30 tablet, Rfl: 3    metFORMIN (GLUCOPHAGE) 1000 MG tablet, Take 1 tablet (1,000 mg total) by mouth 2 (two) times daily with meals., Disp: 180 tablet, Rfl: 4    metoprolol succinate (TOPROL-XL) 100 MG 24 hr tablet, Take 2 tablets (200 mg total) by mouth every evening., Disp: 180 tablet, Rfl: 3    mirtazapine (REMERON) 45 MG tablet, Take 1 tablet by mouth every evevning, Disp: 30 tablet, Rfl: 0    mometasone (ASMANEX TWISTHALER) 220 mcg/ actuation (120) AePB, Inhale 2 puffs into the lungs 2 (two) times daily. Controller, Disp: 1  "each, Rfl: 11    montelukast (SINGULAIR) 10 mg tablet, Take 1 tablet (10 mg total) by mouth every evening., Disp: 30 tablet, Rfl: 11    MULTIVIT,CALC,MINS/IRON/FOLIC (DAILY MULTIPLE FOR WOMEN ORAL), Take 1 tablet by mouth once daily., Disp: , Rfl:     pen needle, diabetic (BD ULTRA-FINE MINI PEN NEEDLE) 31 gauge x 3/16" Ndle, Use 5 a day, Disp: 200 each, Rfl: 3    promethazine (PHENERGAN) 25 MG tablet, , Disp: , Rfl:     spironolactone (ALDACTONE) 25 MG tablet, Take 1 tablet (25 mg total) by mouth once daily., Disp: 30 tablet, Rfl: 6    temazepam (RESTORIL) 15 mg Cap, Take 1 capsule by mouth at bedtime, Disp: 30 capsule, Rfl: 1    topiramate (TOPAMAX) 50 MG tablet, Take 1 tablet (50 mg total) by mouth 2 (two) times daily., Disp: 60 tablet, Rfl: 11    TOUJEO MAX U-300 SOLOSTAR 300 unit/mL (3 mL) InPn, Inject 96 units once daily at bedtime, Disp: 15 mL, Rfl: 3    valACYclovir (VALTREX) 1000 MG tablet, Take 1 tablet (1,000 mg total) by mouth once daily., Disp: 30 tablet, Rfl: 11    valsartan (DIOVAN) 320 MG tablet, Take 1 tablet (320 mg total) by mouth once daily., Disp: 90 tablet, Rfl: 3    zolpidem (AMBIEN) 10 mg Tab, Take 1 tablet by mouth at bedtime, Disp: 30 tablet, Rfl: 0    hydrOXYzine pamoate (VISTARIL) 25 MG Cap, Take 1 capsule (25 mg total) by mouth 2 (two) times a day. (Patient not taking: Reported on 8/19/2020), Disp: 60 capsule, Rfl: 0  Review of patient's allergies indicates:   Allergen Reactions    Codeine Itching    Ibuprofen     Neuromuscular blockers, steroidal Hives     some    Latex, natural rubber Rash    Morphine Rash     itching    Norco [hydrocodone-acetaminophen] Itching, Rash and Hallucinations    Seconal [secobarbital sodium] Rash     itching    Tylox [oxycodone-acetaminophen] Rash     Review of Systems   Constitutional: Negative for chills, fever and weight loss.   Respiratory: Negative for shortness of breath.    Cardiovascular: Negative for chest pain and palpitations. "       Objective:   Body mass index is 46.21 kg/m².  Vitals:    08/19/20 0815   BP: 130/80   Pulse: 78           Ortho/SPM Exam-alert and oriented well-nourished well-developed ambulatory in no acute distress    Respiratory effort is normal    Right shoulder-no acute deformity    Patient has diffuse tenderness anterior lateral and posterior shoulder    Strength is 3/5    Neurovascular of the shoulder and upper arm is normal    Patient has very limited abduction flexion internal and external rotation and with pain    Patient complains of symptoms of paresthesias in both wrist and hands and we will of evaluate this on her next visit and offer treatment at that time.    Psychiatric good affect cognition    IMAGING: X-Ray Lumbar Spine 2 Or 3 Views  Narrative: EXAMINATION:  XR LUMBAR SPINE 2 OR 3 VIEWS    CLINICAL HISTORY:  Back pain or radiculopathy, > 6 wks;  Dorsalgia, unspecified    TECHNIQUE:  Three views of the L-spine    COMPARISON:  01/20/2017    FINDINGS:  No fracture.  No listhesis.  Mild multilevel marginal spurring.  Mild disc space narrowing at L5-S1.  Facet arthropathy at L5-S1.  Impression: As above    Electronically signed by: Pravin Pimentel MD  Date:    08/18/2020  Time:    10:03  X-Ray Cervical Spine 2 or 3 Views  Narrative: EXAMINATION:  XR CERVICAL SPINE 2 OR 3 VIEWS    CLINICAL HISTORY:  Cervicalgia    TECHNIQUE:  AP, lateral and open mouth views of the cervical spine were performed.    COMPARISON:  01/20/2017    FINDINGS:  No fracture.  No listhesis.  Vertebral body heights and disc spaces are generally well maintained.  Odontoid is intact.  Lateral masses are symmetric in appearance.  Dental hardware is noted.  Visualized lung apices are clear.  Prevertebral soft tissues are maintained.  No interval change since the comparison exam.  Impression: As above    Electronically signed by: Pravin Pimentel MD  Date:    08/18/2020  Time:    10:02       Radiographs / Imaging : Relevant imaging results reviewed  by me and interpreted by me, discussed with the patient and / or family -reviewed the plain films with the patient time of some degenerative and calcific tendinitis changes.  We discussed in detail her MRI findings which showed partial intrasubstance tear of supraspinatus and infraspinatus tendons and subdeltoid bursitis as well.    I explained to the patient that these are not generally surgical lesions but that several months of physical therapy, application of ice should be helpful and she would like a cortisone injection today and her blood sugars are improving but we will give her only 1 cc of Celestone today and have warned her to monitor her sugar over the next few days.      Assessment:     Encounter Diagnoses   Name Primary?    Right shoulder tendonitis     Diabetic polyneuropathy associated with type 2 diabetes mellitus Yes        Plan:   Diabetic polyneuropathy associated with type 2 diabetes mellitus    Right shoulder tendonitis  -     Ambulatory referral/consult to Orthopedics  -     Large Joint Aspiration/Injection: R subacromial bursa        The patient verbalized good understanding of the medical issues discussed today and expressed appreciation for the care provided.  Patient was given the opportunity to ask questions and be an active participant in their medical care. Patient had no further questions or concerns at this time.     The patient was encouraged to participate in appropriate physical activity.      Maikel Redmond M.D.  [facility]        This note was dictated using voice recognition software and may have sound a like errors.

## 2020-08-19 NOTE — PROCEDURES
Large Joint Aspiration/Injection: R subacromial bursa    Date/Time: 8/19/2020 8:00 AM  Performed by: Maikel Redmond MD  Authorized by: Maikel Redmond MD     Indications:  Pain  Site marked: the procedure site was marked    Timeout: prior to procedure the correct patient, procedure, and site was verified    Prep: patient was prepped and draped in usual sterile fashion    Local anesthetic:  Bupivacaine 0.25% without epinephrine and lidocaine 1% without epinephrine  Approach:  Posterior  Location:  Shoulder  Site:  R subacromial bursa  Medications:  6 mg betamethasone acetate-betamethasone sodium phosphate 6 mg/mL  Patient tolerance:  Patient tolerated the procedure well with no immediate complications

## 2020-08-19 NOTE — TELEPHONE ENCOUNTER
Spoke with pt and she stated that she had to cancel her appointment with ENT the patient stated that she would need another referral for the ENT doctor.//LB

## 2020-08-19 NOTE — PROGRESS NOTES
"Your lumbar spine x-rays showed mild "degenerative" or "wear-and-tear" type changes. The x-rays did NOT show any fracture or dislocation."

## 2020-08-19 NOTE — LETTER
August 19, 2020      DOMINIK Beach MD  [address]  [place] [address]           [facility]  [address] [facility] [address]          Patient: Yolanda Betts   MR Number: [id number]   YOB: 1970   Date of Visit: 8/19/2020       Dear Dr. DOMINIK Beach:    Thank you for referring Yolanda Betts to me for evaluation. Attached you will find relevant portions of my assessment and plan of care.    If you have questions, please do not hesitate to call me. I look forward to following Yolanda Betts along with you.    Sincerely,    Maikel Redmond MD    Enclosure  CC:  No Recipients    If you would like to receive this communication electronically, please contact [email or web address] or [phone] to request more information on [employer] Link access.    For providers and/or their staff who would like to refer a patient to [facility], please contact us through our one-stop-shop provider referral line, Dangel[facility] Lee, at [phone].    If you feel you have received this communication in error or would no longer like to receive these types of communications, please e-mail [email or web address]

## 2020-08-19 NOTE — TELEPHONE ENCOUNTER
----- Message from Mayra Caballero sent at 8/19/2020  2:21 PM CDT -----  Would like to consult with nurse regarding her needing another referral to ENT, please give a call back at [phone].

## 2020-08-19 NOTE — PATIENT INSTRUCTIONS
Ice 3 times a day to right shoulder for 15 min    Voltaren gel 3 times a day to shoulder    Decreased activity with shoulder for 48 hr after cortisone injection today    Recheck in 4-6 weeks to recheck shoulder and checked wrist symptoms and have x-rays  Understanding Shoulder Impingement Syndrome    Shoulder impingement syndrome is a problem with the shoulder joint. It occurs when certain parts within the joint swell and are pinched. This can cause nagging pain and problems with moving the arm.  What causes shoulder impingement syndrome?  It is possible to develop impingement after years of normal shoulder use. But in most cases the condition occurs because of repeated overhead movements. These include such things as stocking shelves, painting, swimming, and throwing. These movements can irritate parts of the shoulder, leading to swelling. Swollen parts of the shoulder take up more room, making the joint space smaller. Some parts that may be involved include:  · A sac of fluid (bursa) that cushions the shoulder joint.  When the bursa is irritated, it may lead to a condition called bursitis. This is when the bursa swells with fluid, filling and squeezing the joint space.  · Fibrous tissues (tendons) that connect muscle to bone. When tendons are irritated, they may become swollen. This is called tendonitis.  · The end (acromion) of the shoulder blade. This bone may be flat or hooked. If the acromion is hooked, the joint space may be smaller than normal. Growths (bone spurs) on the acromion can also narrow the joint space. Acromion problems dont often cause impingement. But they can make it worse.  Symptoms of shoulder impingement syndrome  Symptoms include pain, pinching, or stiffness in your shoulder. Pain often comes with movement, particularly reaching overhead or backward. It may also be felt when the shoulder is at rest. Pain at night during sleep is common.  Treatment for shoulder impingement  syndrome  Treatment will depend on the cause of the problem, how bad the problem is, and if other parts of the shoulder are damaged. Treatment may include the following:  · Active rest. This allows the shoulder to heal. It means using the arm and shoulder, but avoiding activities that cause pain. These likely include reaching overhead or sleeping on your shoulder.  · Cold packs. These help reduce swelling and relieve pain.  · Prescription or over-the-counter pain medicines. These help relieve pain and swelling.  · Arm and shoulder exercises. These help keep your shoulder joint mobile as it heals. They also help improve muscle strength around the joint.  · Shots of medicine into the joint. This can help reduce swelling and pain for a short time.  If other measures dont work to relieve symptoms, you may need surgery. This opens up space in the joint to allow pain-free motion.  Possible complications of shoulder impingement syndrome  It might be tempting to stop using your shoulder completely to avoid pain. But doing so may lead to a condition called frozen shoulder. To help prevent this, follow instructions you are given for active rest and for doing exercises to help your shoulder heal.  When to call your healthcare provider  Call your healthcare provider right away if you have any of these:  · Fever of 100.4°F (38°C) or higher, or as directed  · Symptoms that dont get better, or get worse  · New symptoms   Date Last Reviewed: 3/10/2016  © 3426-7810 [employer]. [address] [place] [address]. All rights reserved. This information is not intended as a substitute for professional medical care. Always follow your healthcare professional's instructions.

## 2020-08-19 NOTE — TELEPHONE ENCOUNTER
Spoke with the patient, she cancelled her appt.  I explained that she would need to get another referral from her [facility] PCP in order for us to make an appt for her.  She is an adult medicaid patient.      ----- Message from Ching Og sent at 8/19/2020  1:34 PM CDT -----  Pt called to reschedule the appointment she has for today 8/19/2020 please reach out to pt on this matter at [phone]

## 2020-08-20 NOTE — TELEPHONE ENCOUNTER
New referral order entered.    Orders Placed This Encounter   Procedures    Ambulatory referral/consult to ENT

## 2020-08-21 ENCOUNTER — TELEPHONE (OUTPATIENT)
Dept: ADMINISTRATIVE | Facility: HOSPITAL | Age: 50
End: 2020-08-21

## 2020-08-21 NOTE — TELEPHONE ENCOUNTER
Contacted patient to schedule overdue mammogram. Patient states that she has completed mammogram on 07/10/2020.Art

## 2020-08-26 ENCOUNTER — OFFICE VISIT (OUTPATIENT)
Dept: OTOLARYNGOLOGY | Facility: CLINIC | Age: 50
End: 2020-08-26
Payer: MEDICAID

## 2020-08-26 ENCOUNTER — PATIENT OUTREACH (OUTPATIENT)
Dept: OTHER | Facility: OTHER | Age: 50
End: 2020-08-26

## 2020-08-26 VITALS
BODY MASS INDEX: 46.31 KG/M2 | SYSTOLIC BLOOD PRESSURE: 131 MMHG | HEART RATE: 95 BPM | DIASTOLIC BLOOD PRESSURE: 85 MMHG | TEMPERATURE: 98 F | WEIGHT: 206.56 LBS

## 2020-08-26 DIAGNOSIS — R06.83 SNORING: Primary | ICD-10-CM

## 2020-08-26 DIAGNOSIS — E66.2 OBESITY HYPOVENTILATION SYNDROME: ICD-10-CM

## 2020-08-26 DIAGNOSIS — G47.33 OSA (OBSTRUCTIVE SLEEP APNEA): ICD-10-CM

## 2020-08-26 PROCEDURE — 99215 OFFICE O/P EST HI 40 MIN: CPT | Mod: PBBFAC | Performed by: PHYSICIAN ASSISTANT

## 2020-08-26 PROCEDURE — 99204 PR OFFICE/OUTPT VISIT, NEW, LEVL IV, 45-59 MIN: ICD-10-PCS | Mod: S$PBB,,, | Performed by: PHYSICIAN ASSISTANT

## 2020-08-26 PROCEDURE — 99999 PR PBB SHADOW E&M-EST. PATIENT-LVL V: ICD-10-PCS | Mod: PBBFAC,,, | Performed by: PHYSICIAN ASSISTANT

## 2020-08-26 PROCEDURE — 99999 PR PBB SHADOW E&M-EST. PATIENT-LVL V: CPT | Mod: PBBFAC,,, | Performed by: PHYSICIAN ASSISTANT

## 2020-08-26 PROCEDURE — 99204 OFFICE O/P NEW MOD 45 MIN: CPT | Mod: S$PBB,,, | Performed by: PHYSICIAN ASSISTANT
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
## 2020-08-26 NOTE — Clinical Note
Cutler 19 today.  I do not think tonsillectomy would be beneficial.  I recommend repeat sleep study.

## 2020-08-26 NOTE — LETTER
August 26, 2020      DOMINIK Beach MD  [address] [facility]  [place] [address]           [facility]  [address] [facility] [place] [address]  Phone: [phone]  Fax: [phone]          Patient: Yolanda Betts   MR Number: [id number]   YOB: 1970   Date of Visit: 8/26/2020       Dear Dr. DOMINIK Beach:    Thank you for referring Yolanda Betts to me for evaluation. Attached you will find relevant portions of my assessment and plan of care.    If you have questions, please do not hesitate to call me. I look forward to following Yolanda Betts along with you.    Sincerely,    Lou Pfeiffer PA-C    Enclosure  CC:  No Recipients    If you would like to receive this communication electronically, please contact [email or web address] or [phone] to request more information on [facility] [facility] Link access.    For providers and/or their staff who would like to refer a patient to [facility], please contact us through our one-stop-shop provider referral line, [facility], at [phone].    If you feel you have received this communication in error or would no longer like to receive these types of communications, please e-mail [email or web address]

## 2020-08-27 NOTE — PROGRESS NOTES
[facility]: Health  Follow-Up    The history is provided by the patient.             Reason for review: Blood glucose not at goal and Blood pressure not at goal        Topics Covered on Call: physical activity and Diet    Additional Follow-up details: Patient reports that she is doing alright. Patient AVG BP is above goal but readings are trenidng down. Patient BG numbers are trending down as well but patient is still having plenty of elevated readings. Patient reports that she has started a walking program and continues to cook at home to help bring BP and BG numbers down. Patient reports eating out about once every other week now. Will send patient link for [employer] so if she eats out she can check out menu items to help her choose HTN friendly items. Patient has a f/u appointment with pulmonology on 10/27/20 to address sleep apnea/CPAP situation. Will f/u in 4 weeks.         Diet-no change to diet  No 24 hour dietary recall  No change to diet.  Patient reports eating or drinking the following: Patient continues to cook at home and regulate how often she is eating out. Patient states that she is now only eating out about once every other week. Encouraged patient to continue cooking at home. Will discuss food choices she is cooking at home at next encounter.       Physical Activity-Change      She exercises for 30 minutes per day 3 day(s) a week.     She added walking to Her physical activity routine.        Additional physical activity details: Patient reports that she has started walking 3x a week for approx 30 minutes each session. Patient states that she wants to continues to increase her activity levels. Patient and I discussed ways to increase activity. Patient can either increase the duration she walks for each session, the frequency of how often she walks, or she can increase the speed at which she walks. Patient understood.       Medication Adherence-Medication adherence was  asssessed.  Patient continue taking medication as prescribed.          Patient BG numbers are trending down but patient is still having elevated BG numbers. Patient reports no issues taking BP/DM medication as prescribed.     Substance, Sleep, Stress-No change  stress-not assessed  Details:  Intervention(s):    Sleep-assessed  Details:Patient reports sleeping with oxygen but will not sleep through the night  Intervention(s): PAtient has f/u appointment with pulmonology on 10/27/20     Alcohol -not assessed  Details:  Intervention(s):    Tobacco-Not Assessed  Details:  Intervention(s):          Continue current diet/physical activity routine.       Addressed any questions or concerns and patient has my contact information if needed prior to next outreach. Patient verbalizes understanding.          There are no preventive care reminders to display for this patient.    Last 5 Patient Entered Readings                                      Current 30 Day Average: 140/90     Recent Readings 8/10/2020 7/29/2020 7/29/2020 7/29/2020 7/28/2020    SBP (mmHg) 137 145 116 158 138    DBP (mmHg) 87 96 79 101 87    Pulse 91 95 89 116 127        Last 6 Patient Entered Readings                                          Most Recent A1c: 11.6% on 7/7/2020  (Goal: 7%)     Recent Readings 8/27/2020 8/26/2020 8/26/2020 8/26/2020 8/25/2020    Blood Glucose (mg/dL) 147 152 170 206 163

## 2020-08-31 ENCOUNTER — CLINICAL SUPPORT (OUTPATIENT)
Dept: REHABILITATION | Facility: HOSPITAL | Age: 50
End: 2020-08-31
Payer: MEDICAID

## 2020-08-31 DIAGNOSIS — M62.81 PROXIMAL MUSCLE WEAKNESS: ICD-10-CM

## 2020-08-31 DIAGNOSIS — R29.3 POOR POSTURE: ICD-10-CM

## 2020-08-31 DIAGNOSIS — G47.63 SLEEP-RELATED BRUXISM: Primary | ICD-10-CM

## 2020-08-31 DIAGNOSIS — M25.611 DECREASED RIGHT SHOULDER RANGE OF MOTION: ICD-10-CM

## 2020-08-31 DIAGNOSIS — M25.511 RIGHT SHOULDER PAIN, UNSPECIFIED CHRONICITY: ICD-10-CM

## 2020-08-31 DIAGNOSIS — E66.2 OBESITY HYPOVENTILATION SYNDROME: ICD-10-CM

## 2020-08-31 DIAGNOSIS — G47.36 NOCTURNAL HYPOXEMIA DUE TO OBESITY: Chronic | ICD-10-CM

## 2020-08-31 DIAGNOSIS — E66.9 NOCTURNAL HYPOXEMIA DUE TO OBESITY: Chronic | ICD-10-CM

## 2020-08-31 PROCEDURE — 97162 PT EVAL MOD COMPLEX 30 MIN: CPT

## 2020-08-31 NOTE — PLAN OF CARE
[facility]  Physical Therapy Initial Evaluation    Name: Yolanda Betts  Clinic Number: [id number]    Therapy Diagnosis:   Encounter Diagnoses   Name Primary?    Right shoulder pain, unspecified chronicity     Nocturnal hypoxemia due to obesity - WITHOUT ALEN     Obesity hypoventilation syndrome      Physician: Maikel Redmond MD    Physician Orders: PT Eval and Treat  Medical Diagnosis from Referral: R shoulder pain  Evaluation Date: 8/31/2020  Authorization Period Expiration: 8/19/2021  Plan of Care Expiration: 11/23/2020  Visit # / Visits authorized: 1/ 1    Precautions: Standard, Diabetes and Bipolar disored, schizoaffective disorder    Time In: 8:45 am  Time Out: 9:20 am  Total Billable Time: 5 minutes    SUBJECTIVE   Date of onset: 8/19/2020  History of current condition - Yolanda is a 50 y.o. female whom reports hx of pain in the R posterior shoulder and across the top of the back. Insidious onset ~1.5 years ago and pain is worsening. Recent aspiration and injection by Dr. Redmond, no changes noted by patient. Pain across B upper trapezius for over 10 years. Difficulty sleeping due to pain.      Medical History:   Past Medical History:   Diagnosis Date    Abnormal Pap smear of cervix     HPV genital warts    Anemia     Anxiety     Arthritis     Asthma 10/11/2016    Bipolar 1 disorder     Diabetes mellitus, type 2     Dyslipidemia associated with type 2 diabetes mellitus 5/13/2019    Genital warts     GERD (gastroesophageal reflux disease)     Herpes simplex virus (HSV) infection     Hyperlipidemia     Hypertension     Hypertension complicating diabetes 5/5/2019    Migraine with aura and without status migrainosus, not intractable 3/21/2016    Mild persistent asthma without complication 10/11/2016    Morbid obesity with body mass index (BMI) of 45.0 to 49.9 in adult 8/3/2017    Obstructive sleep apnea     ALEN (obstructive sleep apnea)     2L per N/C q HS     Schizoaffective disorder, bipolar type 2019    Seasonal allergic rhinitis due to pollen 2019    Type 2 diabetes mellitus with hyperglycemia, with long-term current use of insulin 2017    Type 2 diabetes mellitus with hyperglycemia, without long-term current use of insulin 2017       Surgical History:   Yolanda Betts  has a past surgical history that includes  section; Mouth surgery (); Breast surgery (Bilateral, ); Tubal ligation; Colonoscopy; Belt abdominoplasty (); Colonoscopy (N/A, 2018); Colonoscopy (N/A, 2018); Oophorectomy; Hysterectomy; abcess removed right back; Robot-assisted cholecystectomy using da Sharonda Xi (N/A, 1/3/2020); and Total Reduction Mammoplasty.    Medications:   Yolanda has a current medication list which includes the following prescription(s): albuterol, alprazolam, amlodipine, amlodipine, amlodipine, ammonium lactate, aripiprazole, aspirin, atorvastatin, bd insulin syringe ultra-fine, benztropine, blood sugar diagnostic, blood-glucose meter, cyclobenzaprine, cyclosporine, duloxetine, ergocalciferol, bydureon, fish oil-omega-3 fatty acids, furosemide, gabapentin, glimepiride, hydralazine, hydroxyzine pamoate, hydroxyzine pamoate, insulin aspart u-100, lancets, linaclotide, latuda, magnesium oxide, metformin, metoprolol succinate, mirtazapine, mometasone, montelukast, multivit,calc,mins/iron/folic, pen needle, diabetic, promethazine, spironolactone, temazepam, topiramate, toujeo max u-300 solostar, valacyclovir, valsartan, and zolpidem.    Allergies:   Review of patient's allergies indicates:   Allergen Reactions    Codeine Itching    Ibuprofen     Mobic [meloxicam]     Neuromuscular blockers, steroidal Hives     some    Latex, natural rubber Rash    Morphine Rash     itching    Norco [hydrocodone-acetaminophen] Itching, Rash and Hallucinations    Seconal [secobarbital sodium] Rash     itching    Tylox  [oxycodone-acetaminophen] Rash        Imaging: MRI of R shoulder on 7/18/2020    Prior Therapy: Yes (3 visits, no changes noted)   Social History: Pt lives alone  Occupation: Pt is not working  Prior Level of Function: Independent and pain free with all ADL, IADL, community mobility and functional activities.   Current Level of Function: independent with all ADL, IADL, community mobility and functional activities with reports of increased pain and need for increased time and frequent breaks. Cannot do anything that requires her to lift the arm above her  Head; states she will either get someone to come help her or just not do it.         Pain:  Current 8/10, worst 10/10, best 5/10   Location: R shoulder with radiating to the R elbow and hand. Is not sure where the pain is because she also has carpal tunnel   Description: Aching, Throbbing and Sharp, shooting pains.  Aggravating Factors: lifting the R arm overhead, constant movement, laying on the R arm, lifting anything with the R arm   Easing Factors: rest, heating pad, gabapentin    Dominant Extremity: Right    Pts goals: Pt reported goals are to decrease overall pain levels in order to return to maximal functional level.    OBJECTIVE   (x = not tested due to pain and/or inability to obtain test position)    RANGE OF MOTION:    Cervical Right   (spine)  8/31/2020 Left     8/31/2020 Pain/Dysfunction with Movement Goal   Cervical Flexion (60) 38 --- B posterior neck and shoulders    Cervical Extension (90) 20 --- Posterior neck     Cervical Side Bending (45) 32 20 Pain across B shoulders     Cervical Rotation (75) 60 50 Increased pain in B shoulders with movement to L       Shoulder AROM Right  8/31/2020 Left  8/31/2020 Pain/Dysfunction with Movement Goal   Shoulder Flexion (180) 90 160     Shoulder Extension (60) 15 30     Shoulder Abduction (180) 90 145     Shoulder ER (90) R ear C7     Shoulder IR (70) L2 T12       Passive shoulder range of motion not tolerated  B due to fear of pain and muscle guarding       STRENGTH:    U/E MMT Right  8/31/2020 Left  8/31/2020 Pain/Dysfunction with Movement Goal   Shoulder Flexion 3-/5 4-/5  5/5 B   Shoulder Extension 3-/5 4-/5  5/5 B   Shoulder Abduction 3-/5 4-/5 Pain on L, pain on R  5/5 B   Shoulder IR 3+/5 4-/5 Pain on L, pain on R  5/5 B   Shoulder ER   3+/5 4-/5 Pain on R  5/5 B   Serratus Anterior 3-/5 3-/5  5/5 B   Middle Trapezius   2+/5 2+/5  5/5 B   Lower Trapezius 2+/5 2+/5  5/5 B   Elbow Flexion  4-/5 4-/5  5/5 B   Elbow Extension 4-/5 4-/5  5/5 B       MUSCLE LENGTH:     Muscle Tested  Right  8/31/2020 Left   8/31/2020 Goal   Upper Trapezius  decreased decreased Normal B    Levator Scapulae  decreased decreased Normal B   Sternocleidomastoid decreased decreased Normal B   Pectoralis Minor  decreased decreased Normal B   Pectoralis Major decreased decreased Normal B       Joint mobility and special tests not performed due to muscle guarding and inability to obtain test positions due to pain      Palpation: Increased tone and tenderness noted with palpation of bilateral cervical paraspinals, upper trapezius, levator scapulae , biceps brachii, deltoid, supraspinatus , infraspinatus , teres major and minor , subscapularis  and thoracic paraspinals. Increased tenderness noted with palpation of bilateral acromion.     Posture:  Pt presents with postural abnormalities which include: forward head, rounded shoulders , increased thoracic kyphosis  and increased lumbar lordosis    Movement Analysis: increased shoulder hiking, upper trapezius activation, and contralateral trunk lean noted with B shoulder flexion and abduction.       FUNCTION:     CMS Impairment/Limitation/Restriction for FOTO Shoulder Survey    Therapist reviewed FOTO scores for Yolanda Betts on 8/31/2020.   FOTO documents entered into EPIC - see Media section.    Limitation Score: 68%         TREATMENT   Treatment Time In: 9:15 am  Treatment Time Out: 9:20  "am  Total Treatment time separate from Evaluation: 5 minutes    Education/Self-Care provided: (5 minutes)   Patient educated on the impairments noted above and the effects of physical therapy intervention to improve overall condition and QOL.       ASSESSMENT   Yolanda is a 50 y.o. female referred to outpatient Physical Therapy with a medical diagnosis of R shoulder pain. Pt presents with impairments including: decreased ROM, decreased strength, decreased muscle length, postural abnormalities and decreased overall function.    Pt prognosis is Fair.   Pt will benefit from skilled outpatient Physical Therapy to address the deficits stated above and in the chart below, provide pt/family education, and to maximize pt's level of independence.     Plan of care discussed with patient: Yes  Pt's spiritual, cultural and educational needs considered and patient is agreeable to the plan of care and goals as stated below:     Anticipated Barriers for therapy: co-morbidities, sedentary lifestyle, chronicity of condition, lack of understanding of condition and adherence to treatment plan    Medical Necessity is demonstrated by the following  History  Co-morbidities and personal factors that may impact the plan of care Co-morbidities:   anxiety, diabetes, high BMI, HTN and fibromyalgia, bipolar disorder, schizioaffective disorder    Personal Factors:   coping style  lifestyle     high   Examination  Body Structures and Functions, activity limitations and participation restrictions that may impact the plan of care Body Regions:   neck  back  upper extremities  trunk    Body Systems:    gross symmetry  ROM  strength  gross coordinated movement  transitions    Participation Restrictions:   See above in "Current Level of Function"     Activity limitations:   Learning and applying knowledge  no deficits    General Tasks and Commands  no deficits    Communication  no deficits    Mobility  lifting and carrying objects  fine hand use " (grasping/picking up)  driving (bike, car, motorcycle)    Self care  washing oneself (bathing, drying, washing hands)  caring for body parts (brushing teeth, shaving, grooming)  dressing  looking after one's health    Domestic Life  shopping  cooking  doing house work (cleaning house, washing dishes, laundry)  assisting others    Interactions/Relationships  no deficits    Life Areas  no deficits    Community and Social Life  community life  recreation and leisure         high   Clinical Presentation evolving clinical presentation with changing clinical characteristics moderate   Decision Making/ Complexity Score: moderate       GOALS:    Short Term Goals:  6 weeks    1. Pain: Pt will demonstrate improved pain by reports of less than or equal to 7/10 worst pain on the verbal rating scale in order to progress toward maximal functional ability and improve QOL.    2. Function: Patient will demonstrate improved function as indicated by a functional limitation score of less than or equal to 58 out of 100 on FOTO.    3. Mobility: Patient will improve AROM to 50% of stated goals, listed in objective measures above, in order to progress towards independence with functional activities.     4. Strength: Patient will improve strength to 50% of stated goals, listed in objective measures above, in order to progress towards independence with functional activities.     5. HEP: Patient will demonstrate independence with HEP in order to progress toward functional independence.      Long Term Goals:  12 weeks    1. Pain: Pt will demonstrate improved pain by reports of less than or equal to 5/10 worst pain on the verbal rating scale in order to progress toward maximal functional ability and improve QOL.      2. Function: Patient will demonstrate improved function as indicated by a functional limitation score of less than or equal to 48 out of 100 on FOTO.    3. Mobility: Patient will improve AROM to stated goals, listed in objective  measures above, in order to return to maximal functional potential and improve quality of life.    4. Strength: Patient will improve strength to stated goals, listed in objective measures above, in order to improve functional independence and quality of life.    5. Patient will return to normal ADL's, IADL's, community involvement, recreational activities, and work-related activities with less than or equal to 5/10 pain and maximal function.         PLAN   Plan of care Certification: 8/31/2020 to 11/23/2020.    Outpatient Physical Therapy 2 times weekly for 12 weeks to include any combination of the following interventions: virtual visits, dry needling, modalities, electrical stimulation (IFC, Pre-Mod, Attended with Functional Dry Needling), Cervical/Lumbar Traction, Manual Therapy, Neuromuscular Re-ed, Patient Education, Self Care, Therapeutic Activites and Therapeutic Exercise     Thank you for this referral.    These services are reasonable and necessary for the conditions set forth above while under my care.    Kathy Sanders, PT, DPT

## 2020-09-05 ENCOUNTER — LAB VISIT (OUTPATIENT)
Dept: OTOLARYNGOLOGY | Facility: CLINIC | Age: 50
End: 2020-09-05
Payer: MEDICAID

## 2020-09-05 DIAGNOSIS — G47.63 SLEEP-RELATED BRUXISM: ICD-10-CM

## 2020-09-05 PROCEDURE — U0003 INFECTIOUS AGENT DETECTION BY NUCLEIC ACID (DNA OR RNA); SEVERE ACUTE RESPIRATORY SYNDROME CORONAVIRUS 2 (SARS-COV-2) (CORONAVIRUS DISEASE [COVID-19]), AMPLIFIED PROBE TECHNIQUE, MAKING USE OF HIGH THROUGHPUT TECHNOLOGIES AS DESCRIBED BY CMS-2020-01-R: HCPCS

## 2020-09-06 ENCOUNTER — NURSE TRIAGE (OUTPATIENT)
Dept: ADMINISTRATIVE | Facility: CLINIC | Age: 50
End: 2020-09-06

## 2020-09-06 LAB — SARS-COV-2 RNA RESP QL NAA+PROBE: NOT DETECTED

## 2020-09-06 NOTE — TELEPHONE ENCOUNTER
Pt having trouble keep BG up, pt had to call the ambulance x 4 days ago when it was 79. Current BG is 95. Pt states on aggressive treatment for DM pt is suppose to take morning insulin but unsure if she should take it. BG 95 (before breakfast). This morning took 1000 mg of metformin and glimepiride 4mg, suppose to take 40 units of Novolog and then weekly injection of bydureon 2mg but unsure if she should.     Took Toujeolast night (100 units) and 30 units of novolog (suppose to take 40 units novolog before meals BID per pt).    Sent secure chat to Dr. Malka Severino (endo). Per Dr. Severino: Pt instructed to stop glimepiride completely. Reduce Novolog to 25 units before meals and reduce Toujeo to 90 units and continue bydureon and metformin as prescribed. When BG <70 treat with 15 grams of carbs then recheck in 15 minutes to ensure over 70. Advised pt. VU.         Reason for Disposition   [1] Caller has URGENT medication question about med that PCP or specialist prescribed AND [2] triager unable to answer question    Protocols used: MEDICATION QUESTION CALL-A-

## 2020-09-07 DIAGNOSIS — M79.7 FIBROMYALGIA: Chronic | ICD-10-CM

## 2020-09-07 DIAGNOSIS — I15.2 HYPERTENSION COMPLICATING DIABETES: Chronic | ICD-10-CM

## 2020-09-07 DIAGNOSIS — E11.59 HYPERTENSION COMPLICATING DIABETES: Chronic | ICD-10-CM

## 2020-09-07 RX ORDER — AMLODIPINE BESYLATE 10 MG/1
10 TABLET ORAL DAILY
Qty: 30 TABLET | Refills: 3 | Status: CANCELLED | OUTPATIENT
Start: 2020-09-07

## 2020-09-08 ENCOUNTER — HOSPITAL ENCOUNTER (OUTPATIENT)
Dept: SLEEP MEDICINE | Facility: HOSPITAL | Age: 50
Discharge: HOME | End: 2020-09-08
Attending: PHYSICIAN ASSISTANT
Payer: MEDICAID

## 2020-09-08 DIAGNOSIS — R06.83 SNORING: ICD-10-CM

## 2020-09-08 DIAGNOSIS — G47.20 CIRCADIAN RHYTHM DISORDER: ICD-10-CM

## 2020-09-08 DIAGNOSIS — E66.2 OBESITY HYPOVENTILATION SYNDROME: ICD-10-CM

## 2020-09-08 DIAGNOSIS — G47.33 OSA (OBSTRUCTIVE SLEEP APNEA): ICD-10-CM

## 2020-09-08 DIAGNOSIS — G47.10 HYPERSOMNOLENCE DISORDER, PERSISTENT, SEVERE: ICD-10-CM

## 2020-09-08 PROCEDURE — 95810 POLYSOM 6/> YRS 4/> PARAM: CPT | Mod: 26,,, | Performed by: INTERNAL MEDICINE

## 2020-09-08 PROCEDURE — 95810 PR POLYSOMNOGRAPHY, 4 OR MORE: ICD-10-PCS | Mod: 26,,, | Performed by: INTERNAL MEDICINE

## 2020-09-08 PROCEDURE — 95810 POLYSOM 6/> YRS 4/> PARAM: CPT

## 2020-09-08 RX ORDER — GABAPENTIN 300 MG/1
600 CAPSULE ORAL 3 TIMES DAILY
Qty: 90 CAPSULE | Refills: 3 | Status: SHIPPED | OUTPATIENT
Start: 2020-09-08 | End: 2020-09-17 | Stop reason: SDUPTHER

## 2020-09-08 NOTE — Clinical Note
Normal Sleep study.  Hypersomnolence  Yolanda Betts - 1970  Page 2 of 3  Electronically Authenticated By Sylvain Rainey MD on 09/10/2020 04:08 PM, from [phone]  Sylvain Rainey MD  NPI: [id number]  RECOMMENDATIONS  MISCELLANEOUS  Insomnia related to Sleep initiation/sleep maintaince/terminal  Circardian sleep disorder  Polysomnography followed by Multiple Sleep Latency Test to evaluate for Narcolepsy.  Behavioural weight loss interventions ( Qishar Burciagaig or Weight Watchers), bariatric assessment for medical  and or surgical options for weight loss  Avoid alcohol, sedatives and other CNS depressants that may worsen sleep apnea and disrupt normal sleep  architecture.  Sleep hygiene should be reviewed to assess factors that may improve sleep quality.  Weight management and regular exercise should be initiated or continued.  Refer to [facility] Disorders [facility]  in [facility] for prompt E&M

## 2020-09-09 ENCOUNTER — TELEPHONE (OUTPATIENT)
Dept: PULMONOLOGY | Facility: CLINIC | Age: 50
End: 2020-09-09

## 2020-09-09 ENCOUNTER — TELEPHONE (OUTPATIENT)
Dept: INTERNAL MEDICINE | Facility: CLINIC | Age: 50
End: 2020-09-09

## 2020-09-09 ENCOUNTER — TELEPHONE (OUTPATIENT)
Dept: OBSTETRICS AND GYNECOLOGY | Facility: CLINIC | Age: 50
End: 2020-09-09

## 2020-09-09 NOTE — TELEPHONE ENCOUNTER
----- Message from Sylvain Rainey MD sent at 8/28/2020 10:26 AM CDT -----  Patient is follow-up visit  ----- Message -----  From: Lou Pfeiffer PA-C  Sent: 8/26/2020   9:09 AM CDT  To: Sylvain Rainey MD    [place] 19 today.  I do not think tonsillectomy would be beneficial.  I recommend repeat sleep study.

## 2020-09-09 NOTE — TELEPHONE ENCOUNTER
Spoke with ptn regarding getting an appointment schedule informed pt that the next available appointment would be 11/10/2020.Pt would like to know if she can get in before than.//LB

## 2020-09-09 NOTE — TELEPHONE ENCOUNTER
----- Message from Jany Wood sent at 9/9/2020 10:41 AM CDT -----  Patient called in regards to make an appointment for her annual , patient had medicaid , please call back at [phone].        Thanks,  Jany Wood

## 2020-09-09 NOTE — TELEPHONE ENCOUNTER
----- Message from Jany Wood sent at 9/9/2020 10:45 AM CDT -----  .Type:  Sooner Apoointment Request    Caller is requesting a sooner appointment.  Caller declined first available appointment listed below.  Caller will not accept being placed on the waitlist and is requesting a message be sent to doctor.  Name of Caller: Yolanda   When is the first available appointment? Feb   Symptoms: wellness   Would the patient rather a call back or a response via [email or web address]?  call  Best Call Back Number: [phone]  Additional Information: n/a

## 2020-09-10 NOTE — PROCEDURES
"Normal Sleep study.  Hypersomnolence  Yolanda Betts - 1970  Page 2 of 3  Electronically Authenticated By Sylvain Rainey MD on 09/10/2020 04:08 PM, from [phone]  Sylvain Rainey MD  NPI: [id number]  RECOMMENDATIONS  MISCELLANEOUS  Insomnia related to Sleep initiation/sleep maintaince/terminal  Circardian sleep disorder  Polysomnography followed by Multiple Sleep Latency Test to evaluate for Narcolepsy.  Behavioural weight loss interventions ( Qi Gonzalo or Weight Watchers), bariatric assessment for medical  and or surgical options for weight loss  Avoid alcohol, sedatives and other CNS depressants that may worsen sleep apnea and disrupt normal sleep  architecture.  Sleep hygiene should be reviewed to assess factors that may improve sleep quality.  Weight management and regular exercise should be initiated or continued.  Refer to Sleep Disorders Clinic  in [facility] for prompt E&M      See imported Sleep Study result in "Chart Review" under the   "Media tab".      (This Sleep Study was interpreted by a Board Certified Sleep   Specialist who conducted an epoch-by-epoch review of the entire   raw data recording.)     (The indication for this sleep study was reviewed and deemed   appropriate by [employer] Practice Parameters or other reasons by a   Board Certified Sleep Specialist.)    Sylvain Rainey MD     "

## 2020-09-11 ENCOUNTER — TELEPHONE (OUTPATIENT)
Dept: OTOLARYNGOLOGY | Facility: CLINIC | Age: 50
End: 2020-09-11

## 2020-09-11 DIAGNOSIS — R06.83 SNORING: Primary | ICD-10-CM

## 2020-09-11 NOTE — TELEPHONE ENCOUNTER
Please let patient know that her sleep study did not show apnea but Dr. Rainey recommends further workup.  Will place referral to Sleep Medicine for further evaluation and management.

## 2020-09-14 ENCOUNTER — TELEPHONE (OUTPATIENT)
Dept: PHARMACY | Facility: CLINIC | Age: 50
End: 2020-09-14

## 2020-09-14 ENCOUNTER — TELEPHONE (OUTPATIENT)
Dept: ENDOCRINOLOGY | Facility: CLINIC | Age: 50
End: 2020-09-14

## 2020-09-14 NOTE — TELEPHONE ENCOUNTER
Good Afternoon,     The prior authorization for Yolanda Betts's Toujeo Max U-300 Solostar prescription has been APPROVED FROM 9/11/2020 TO 9/11/2021 with copayment of $0.00.       Patient has been notified of the decision on 9/14/2020 and patient states she will  medication on Tuesday.    If there are any additional questions or concerns, please contact me.      Thank You!   Consuelo Wolfe CPhT, B.A  Patient Care Advocate   [facility]  Phone: [phone] Ext [phone]  Fax: [phone]

## 2020-09-14 NOTE — TELEPHONE ENCOUNTER
Yes she wants Dexcom and she also needs new inserts for her shoes      ----- Message from Malka Severino MD sent at 9/14/2020  3:49 PM CDT -----  Regarding: FW: Advice  Contact: Patient  Please confirm with patient to see if she is wanting us to go ahead and try to send off paperwork again for the dex com  ----- Message -----  From: Quita Blas MA  Sent: 9/9/2020   1:59 PM CDT  To: Malka Severino MD  Subject: FW: Advice                                         ----- Message -----  From: Kemi Mcgee  Sent: 9/8/2020   3:03 PM CDT  To: [facility] Staff  Subject: Advice                                           Patient would like a call back concerning getting a dexcon meter. Please call patient at Ph [phone].

## 2020-09-15 ENCOUNTER — HOSPITAL ENCOUNTER (OUTPATIENT)
Dept: RADIOLOGY | Facility: HOSPITAL | Age: 50
Discharge: HOME | End: 2020-09-15
Attending: FAMILY MEDICINE
Payer: MEDICAID

## 2020-09-15 ENCOUNTER — DOCUMENTATION ONLY (OUTPATIENT)
Dept: PULMONOLOGY | Facility: CLINIC | Age: 50
End: 2020-09-15

## 2020-09-15 ENCOUNTER — CLINICAL SUPPORT (OUTPATIENT)
Dept: DIABETES | Facility: CLINIC | Age: 50
End: 2020-09-15
Payer: MEDICAID

## 2020-09-15 VITALS — BODY MASS INDEX: 46.07 KG/M2 | WEIGHT: 204.81 LBS | HEIGHT: 56 IN

## 2020-09-15 DIAGNOSIS — Z79.4 ENCOUNTER FOR LONG-TERM (CURRENT) USE OF INSULIN: ICD-10-CM

## 2020-09-15 DIAGNOSIS — E11.65 UNCONTROLLED TYPE 2 DIABETES MELLITUS WITH HYPERGLYCEMIA: Primary | ICD-10-CM

## 2020-09-15 DIAGNOSIS — M25.531 BILATERAL WRIST PAIN: ICD-10-CM

## 2020-09-15 DIAGNOSIS — M25.532 BILATERAL WRIST PAIN: ICD-10-CM

## 2020-09-15 PROCEDURE — 73130 XR HAND COMPLETE 3 VIEWS BILATERAL: ICD-10-PCS | Mod: 26,50,, | Performed by: RADIOLOGY

## 2020-09-15 PROCEDURE — 73110 XR WRIST COMPLETE 3 VIEWS BILATERAL: ICD-10-PCS | Mod: 26,50,, | Performed by: RADIOLOGY

## 2020-09-15 PROCEDURE — 73110 X-RAY EXAM OF WRIST: CPT | Mod: 26,50,, | Performed by: RADIOLOGY

## 2020-09-15 PROCEDURE — 73110 X-RAY EXAM OF WRIST: CPT | Mod: TC,50

## 2020-09-15 PROCEDURE — 99999 PR PBB SHADOW E&M-EST. PATIENT-LVL IV: CPT | Mod: PBBFAC,,, | Performed by: DIETITIAN, REGISTERED

## 2020-09-15 PROCEDURE — 73130 X-RAY EXAM OF HAND: CPT | Mod: TC,50

## 2020-09-15 PROCEDURE — G0108 DIAB MANAGE TRN  PER INDIV: HCPCS | Mod: PBBFAC | Performed by: DIETITIAN, REGISTERED

## 2020-09-15 PROCEDURE — 99214 OFFICE O/P EST MOD 30 MIN: CPT | Mod: PBBFAC | Performed by: DIETITIAN, REGISTERED

## 2020-09-15 PROCEDURE — 99999 PR PBB SHADOW E&M-EST. PATIENT-LVL IV: ICD-10-PCS | Mod: PBBFAC,,, | Performed by: DIETITIAN, REGISTERED

## 2020-09-15 PROCEDURE — 73130 X-RAY EXAM OF HAND: CPT | Mod: 26,50,, | Performed by: RADIOLOGY

## 2020-09-15 NOTE — PROGRESS NOTES
Diabetes Education  Author: Yara Sanchez RD, CDE  Date: 9/15/2020    Diabetes Care Management Summary  Diabetes Education Record Assessment/Progress: Comprehensive/Group(assessment 7/20/20)  Current Diabetes Risk Level: High     Last A1c:   Lab Results   Component Value Date    HGBA1C 11.6 (H) 07/07/2020      Diabetes Type  Diabetes Type : Type II     Digital Diabetes:   Jany Up, PharmD  Bipin Dyson,     Diabetes History  Diabetes Diagnosis: >10 years(>20yrs)  Current Treatment: (Metformin 1000mg 1tab twice daily; bydureon 2mg wkly; toujeo max 90units daily; novolog ac 25units; amaryl stopped, toujeo and novolog reduced due hypoglycemic episodes.)  Reviewed Problem List with Patient: Yes    Health Maintenance was reviewed today with patient. Discussed with patient importance of routine eye exams, foot exams/foot care, blood work (i.e.: A1c, microalbumin, and lipid), dental visits, yearly flu vaccine, and pneumonia vaccine as indicated by PCP. Patient verbalized understanding.     Health Maintenance Topics with due status: Not Due       Topic Last Completion Date    TETANUS VACCINE 12/21/2017    Colonoscopy 02/28/2018    Lipid Panel 07/07/2020    Hemoglobin A1c 07/07/2020    Mammogram 07/10/2020    Foot Exam 07/14/2020     Health Maintenance Due   Topic Date Due    Influenza Vaccine (1) 08/01/2020       Nutrition  Meal Planning: (Intake ~1200 cals/d. Pt limiting carb 20-40grams/meal, ~15 grams/snack (3x/d). No excess fat, sodium. Pt has decreased starches further and reduced fruit serv from 4 to 3x/d.)  Meal Plan 24 Hour Recall - Breakfast: oatmeal 1cup (few raisins, nuts) OR 2 eggs, 1slc ww toast  Meal Plan 24 Hour Recall - Lunch: sandwich (ham/cheese) 2slc whole grain, occ fruit OR leftovers  Meal Plan 24 Hour Recall - Dinner: meatballs, 1/2c rice/gravy, 1/2-2/3c corn OR pork ribs (air fryer), cabbage  Meal Plan 24 Hour Recall - Snack: fruit (3small daily); marco: water; dining out: 2x/mos; marco:  water    Monitoring   Self Monitoring : Digital diabetes records reviewed. Fst BG ; ac 101-170, 243. Pt reports two episodes hypoglycemia requiring EMS. She doesn't have hypoglycemic treatment products avail.  Can you tell when your blood sugar is too high?: no    Exercise   Exercise Type: (walking 30min 3d/wk - increased from 20min 3d/wk)    Social History  Preferred Learning Method: Face to Face  Primary Support: Self  Smoking Status: Never a Smoker  Alcohol Use: Never     DDS-2 Score  ( > 3 = SIGNIFICANT DISTRESS): 1       Barriers to Change  Barriers to Change: None  Learning Challenges : None    Readiness to Learn   Readiness to Learn : Eager    Cultural Influences  Cultural Influences: No    Diabetes Education Assessment/Progress  Diabetes Disease Process (diabetes disease process and treatment options): Demonstrates Understanding/Competency(verbalizes/demonstrates)  Nutrition (Incorporating nutritional management into one's lifestyle): Discussion, Individual Session, Demonstrates Understanding/Competency (verbalizes/demonstrates)  Physical Activity (incorporating physical activity into one's lifestyle): Discussion, Individual Session, Demonstrates Understanding/Competency (verbalizes/demonstrates)  Medications (states correct name, dose, onset, peak, duration, side effects & timing of meds): Discussion, Individual Session, Demonstrates Understanding/Competency(verbalizes/demonstrates), Written Materials Provided  Monitoring (monitoring blood glucose/other parameters & using results): Discussion, Individual Session, Demonstrates Understanding/Competency (verbalizes/demonstrates)  Acute Complications (preventing, detecting, and treating acute complications): Discussion, Individual Session, Demonstrates Understanding/Competency (verbalizes/demonstrates)  Chronic Complications (preventing, detecting, and treating chronic complications): Demonstrates Understanding/Competency  (verbalizes/demonstrates)  Clinical (diabetes, other pertinent medical history, and relevant comorbidities reviewed during visit): Demonstrates Understanding/Competency (verbalizes/demonstrates)  Cognitive (knowledge of self-management skills, functional health literacy): Discussion, Individual Session, Demonstrates Understanding/Competency (verbalizes/demonstrates)  Psychosocial (emotional response to diabetes): Discussion, Individual Session, Demonstrates Understanding/Competency (verbalizes/demonstrates)  Diabetes Distress and Support Systems: Discussion, Individual Session, Demonstrates Understanding/Competency (verbalizes/demonstrates)  Behavioral (readiness for change, lifestyle practices, self-care behaviors): Discussion, Individual Session, Demonstrates Understanding/Competency (verbalizes/demonstrates)    Goals  Patient has selected/evaluated goals during today's session: Yes, evaluated  Healthy Eating: Set(move fruit serv into meal instead of at snack so that Novolog can cover fruit BG spike; use 0-5gram carb snacks btw meals (reviewed examples))  Met Percentage : 75%  Start Date: 09/15/20(continue increase nonstarchy vegetables 1+cup 2x/d; continue limit carb 15-30grams/meal)  Target Date: 10/14/20  Physical Activity: Set(150min/wk - walking program)  Met Percentage : 50%  Start Date: 09/15/20(increase time 40min 3d/wk or increase 4-5d/wk 30min)  Target Date: 10/14/20  Monitoring: Set(continue test BG 4x/d; consider Dexcom CGMS)  Met Percentage : 50%  Start Date: 09/15/20  Target Date: 10/14/20  Medications: % Met(dose diabetes medications as directed)  Met Percentage : 100%  Reducing Risks: Set(obtain hypoglycemic tx (glu tab, soft peppermints))  Start Date: 09/15/20  Target Date: 10/14/20     Diabetes Care Plan/Intervention  Education Plan/Intervention: Individual Follow-Up DSMT([facility] w/ Dr. Severino, digital diabetes, pcp for medical mgmt.) Encouraged lifestyle progress and assisted further w/ goal setting.  RTC ~1mos, prn or as referred. Pt reports Dexcom pending; encouraged her to call for training appt once she receives.     Diabetes Meal Plan  Restrictions: Low Fat, Low Sodium, Restricted Carbohydrate  Calories: 1200, 1400  Carbohydrate Per Meal: 20-30g, 30-45g  Carbohydrate Per Snack : 7-15g    Today's Self-Management Care Plan was developed with the patient's input and is based on barriers identified during today's assessment.    The long and short-term goals in the care plan were written with the patient/caregiver's input. The patient has agreed to work toward these goals to improve her overall diabetes control.      The patient received a copy of today's self-management plan and verbalized understanding of the care plan, goals, and all of today's instructions.      The patient was encouraged to communicate with her physician and care team regarding her condition(s) and treatment.  I provided the patient with my contact information today and encouraged her to contact me via phone or patient portal as needed.     Education Units of Time   Time Spent: 60 min

## 2020-09-15 NOTE — LETTER
September 15, 2020        Malka Severino MD  [address]  [place] [address]             [facility] Diabetes [facility]  [address]  [place] [address]  Phone: [phone]  Fax: [phone]   Patient: Yolanda Betts   MR Number: [id number]   YOB: 1970   Date of Visit: 9/15/2020       Dear Dr. Severino:    Thank you for referring Yolanda Betts to me for evaluation. Below are the relevant portions of my assessment and plan of care.     If you have questions, please do not hesitate to call me. I look forward to following Yolanda along with you.    Sincerely,      Yara Sanchez RD, CDE           CC  DOMINIK Beach MD

## 2020-09-16 ENCOUNTER — OFFICE VISIT (OUTPATIENT)
Dept: SPORTS MEDICINE | Facility: CLINIC | Age: 50
End: 2020-09-16
Payer: MEDICAID

## 2020-09-16 VITALS
HEART RATE: 90 BPM | DIASTOLIC BLOOD PRESSURE: 70 MMHG | HEIGHT: 56 IN | BODY MASS INDEX: 46.07 KG/M2 | SYSTOLIC BLOOD PRESSURE: 140 MMHG | WEIGHT: 204.81 LBS

## 2020-09-16 DIAGNOSIS — E11.59 HYPERTENSION COMPLICATING DIABETES: Chronic | ICD-10-CM

## 2020-09-16 DIAGNOSIS — I10 ESSENTIAL HYPERTENSION: Chronic | ICD-10-CM

## 2020-09-16 DIAGNOSIS — E83.42 HYPOMAGNESEMIA: ICD-10-CM

## 2020-09-16 DIAGNOSIS — E11.42 DIABETIC POLYNEUROPATHY ASSOCIATED WITH TYPE 2 DIABETES MELLITUS: Primary | Chronic | ICD-10-CM

## 2020-09-16 DIAGNOSIS — G56.03 BILATERAL CARPAL TUNNEL SYNDROME: ICD-10-CM

## 2020-09-16 DIAGNOSIS — M77.8 RIGHT SHOULDER TENDONITIS: Chronic | ICD-10-CM

## 2020-09-16 DIAGNOSIS — I15.2 HYPERTENSION COMPLICATING DIABETES: Chronic | ICD-10-CM

## 2020-09-16 PROCEDURE — 99215 OFFICE O/P EST HI 40 MIN: CPT | Mod: PBBFAC,PN | Performed by: FAMILY MEDICINE

## 2020-09-16 PROCEDURE — 99999 PR PBB SHADOW E&M-EST. PATIENT-LVL V: ICD-10-PCS | Mod: PBBFAC,,, | Performed by: FAMILY MEDICINE

## 2020-09-16 PROCEDURE — 99999 PR PBB SHADOW E&M-EST. PATIENT-LVL V: CPT | Mod: PBBFAC,,, | Performed by: FAMILY MEDICINE

## 2020-09-16 PROCEDURE — 99214 PR OFFICE/OUTPT VISIT, EST, LEVL IV, 30-39 MIN: ICD-10-PCS | Mod: S$PBB,,, | Performed by: FAMILY MEDICINE

## 2020-09-16 PROCEDURE — 99214 OFFICE O/P EST MOD 30 MIN: CPT | Mod: S$PBB,,, | Performed by: FAMILY MEDICINE

## 2020-09-16 RX ORDER — AMLODIPINE BESYLATE 10 MG/1
10 TABLET ORAL NIGHTLY
Qty: 90 TABLET | Refills: 3 | Status: SHIPPED | OUTPATIENT
Start: 2020-09-16 | End: 2020-12-14 | Stop reason: SDUPTHER

## 2020-09-16 RX ORDER — VALSARTAN 320 MG/1
320 TABLET ORAL DAILY
Qty: 90 TABLET | Refills: 3 | Status: SHIPPED | OUTPATIENT
Start: 2020-09-16 | End: 2021-06-09

## 2020-09-16 RX ORDER — METOPROLOL SUCCINATE 100 MG/1
200 TABLET, EXTENDED RELEASE ORAL NIGHTLY
Qty: 180 TABLET | Refills: 3 | Status: SHIPPED | OUTPATIENT
Start: 2020-09-16 | End: 2020-12-14 | Stop reason: SDUPTHER

## 2020-09-16 RX ORDER — INSULIN ASPART 100 [IU]/ML
INJECTION, SOLUTION INTRAVENOUS; SUBCUTANEOUS
Qty: 15 ML | Refills: 3 | Status: SHIPPED | OUTPATIENT
Start: 2020-09-16 | End: 2020-10-01 | Stop reason: SDUPTHER

## 2020-09-16 RX ORDER — PEN NEEDLE, DIABETIC 30 GX3/16"
NEEDLE, DISPOSABLE MISCELLANEOUS
Qty: 200 EACH | Refills: 3 | Status: SHIPPED | OUTPATIENT
Start: 2020-09-16 | End: 2020-10-30 | Stop reason: SDUPTHER

## 2020-09-16 RX ORDER — INSULIN GLARGINE 300 U/ML
90 INJECTION, SOLUTION SUBCUTANEOUS DAILY
Qty: 15 ML | Refills: 3 | Status: SHIPPED | OUTPATIENT
Start: 2020-09-16 | End: 2020-10-01 | Stop reason: SDUPTHER

## 2020-09-16 RX ORDER — LANOLIN ALCOHOL/MO/W.PET/CERES
400 CREAM (GRAM) TOPICAL DAILY
Qty: 90 TABLET | Refills: 3 | Status: SHIPPED | OUTPATIENT
Start: 2020-09-16 | End: 2020-09-26 | Stop reason: SDUPTHER

## 2020-09-16 NOTE — PROGRESS NOTES
Subjective:     Patient ID: Yolanda Betts is a 50 y.o. female.    Chief Complaint: Pain of the Right Wrist, Pain of the Left Wrist, and Pain of the Right Shoulder      HPI:  Problem - right shoulder pain at night and with any movements of trying to  objects and difficulty with ADLs    Duration - 6 months    Severity -    Pain - 8 of 10   Limitations - interferes with ADLs  Previous Treatment - MRI showed partially torn tendons.  Is in physical therapy but just started.    Other Information - also has complaints of carpal tunnel type problems with tingling and pain into the middle 3 fingers of each hand.  Patient is off work but finds it difficult to do home activities as needed.  Does not have splints  Takes gabapentin from Rheumatology since she has diagnosis of rheumatoid arthritis and also uses Tylenol.  Past Medical History:   Diagnosis Date    Abnormal Pap smear of cervix     HPV genital warts    Anemia     Anxiety     Arthritis     Asthma 10/11/2016    Bipolar 1 disorder     Diabetes mellitus, type 2     Dyslipidemia associated with type 2 diabetes mellitus 5/13/2019    Genital warts     GERD (gastroesophageal reflux disease)     Herpes simplex virus (HSV) infection     Hyperlipidemia     Hypertension     Hypertension complicating diabetes 5/5/2019    Migraine with aura and without status migrainosus, not intractable 3/21/2016    Mild persistent asthma without complication 10/11/2016    Morbid obesity with body mass index (BMI) of 45.0 to 49.9 in adult 8/3/2017    Obstructive sleep apnea     ALEN (obstructive sleep apnea)     2L per N/C q HS    Schizoaffective disorder, bipolar type 4/25/2019    Seasonal allergic rhinitis due to pollen 5/13/2019    Type 2 diabetes mellitus with hyperglycemia, with long-term current use of insulin 12/21/2017    Type 2 diabetes mellitus with hyperglycemia, without long-term current use of insulin 12/21/2017     Past Surgical History:  "  Procedure Laterality Date    abcess removed right back      BELT ABDOMINOPLASTY      BREAST SURGERY Bilateral 1996    Reduction     SECTION      X 2    COLONOSCOPY      COLONOSCOPY N/A 2018    Procedure: colonoscopy for iron deficiency anemia;  Surgeon: Frankie Sanchez MD;  Location: [facility] ENDO;  Service: Endoscopy;  Laterality: N/A;    COLONOSCOPY N/A 2018    Procedure: COLONOSCOPY;  Surgeon: Frankie Sanchez MD;  Location: [facility] ENDO;  Service: Endoscopy;  Laterality: N/A;    HYSTERECTOMY      w/ BSO; hypermenorrhea    MOUTH SURGERY      OOPHORECTOMY      hyst/bso, hypermenorrhea    ROBOT-ASSISTED CHOLECYSTECTOMY USING DA DARI XI N/A 1/3/2020    Procedure: XI ROBOTIC CHOLECYSTECTOMY;  Surgeon: Damir Driscoll MD;  Location: [facility] OR;  Service: General;  Laterality: N/A;    TOTAL REDUCTION MAMMOPLASTY      TUBAL LIGATION       Family History   Problem Relation Age of Onset    Diabetes Mother     Hyperlipidemia Mother     Hypertension Mother     Asthma Mother     COPD Mother     Glaucoma Mother     Thyroid disease Mother     Anesthesia problems Mother         "almost had a cardiac arrest" , blood clots    Hypertension Father     Hyperlipidemia Father     Cancer Father         Brain, lung, liver, kidney    Heart disease Maternal Grandmother     Hyperlipidemia Maternal Grandmother     Hypertension Maternal Grandmother     Cataracts Maternal Grandmother     Diabetes Maternal Grandmother     Heart disease Maternal Grandfather     Hyperlipidemia Maternal Grandfather     Hypertension Maternal Grandfather     Glaucoma Maternal Grandfather     Cancer Maternal Grandfather     Cataracts Maternal Grandfather     Macular degeneration Maternal Grandfather     Diabetes Maternal Grandfather     Heart disease Paternal Grandmother     Hyperlipidemia Paternal Grandmother     Hypertension Paternal Grandmother     Cataracts Paternal Grandmother     Heart " disease Paternal Grandfather     Hyperlipidemia Paternal Grandfather     Hypertension Paternal Grandfather     Cataracts Paternal Grandfather     Breast cancer Maternal Cousin     Breast cancer Maternal Cousin     Breast cancer Maternal Cousin     Breast cancer Maternal Cousin      Social History     Socioeconomic History    Marital status: Single     Spouse name: Not on file    Number of children: Not on file    Years of education: Not on file    Highest education level: Not on file   Occupational History    Not on file   Social Needs    Financial resource strain: Not on file    Food insecurity     Worry: Not on file     Inability: Not on file    Transportation needs     Medical: Not on file     Non-medical: Not on file   Tobacco Use    Smoking status: Never Smoker    Smokeless tobacco: Never Used   Substance and Sexual Activity    Alcohol use: Not Currently     Alcohol/week: 0.0 standard drinks     Comment: socially  No alcohol 72h prior to sx    Drug use: No    Sexual activity: Not Currently     Partners: Male   Lifestyle    Physical activity     Days per week: Not on file     Minutes per session: Not on file    Stress: Not on file   Relationships    Social connections     Talks on phone: Not on file     Gets together: Not on file     Attends [employer] service: Not on file     Active member of club or organization: Not on file     Attends meetings of clubs or organizations: Not on file     Relationship status: Not on file   Other Topics Concern    Not on file   Social History Narrative    Long-term care nurse       Current Outpatient Medications:     albuterol (PROVENTIL) 2.5 mg /3 mL (0.083 %) nebulizer solution, Take 3 mLs (2.5 mg total) by nebulization every 4 (four) hours., Disp: 180 mL, Rfl: 11    ALPRAZolam (XANAX) 1 MG tablet, Take 1 tablet by mouth twice daily, Disp: 60 tablet, Rfl: 0    amLODIPine (NORVASC) 10 MG tablet, Take 1 tablet (10 mg total) by mouth once daily., Disp:  "30 tablet, Rfl: 3    amLODIPine (NORVASC) 10 MG tablet, Take 1 tablet (10 mg total) by mouth every evening., Disp: 90 tablet, Rfl: 3    amLODIPine (NORVASC) 5 MG tablet, Take 1 tablet (5 mg total) by mouth every evening., Disp: 30 tablet, Rfl: 11    ammonium lactate 12 % Crea, Apply 1 Application topically 2 (two) times daily., Disp: 140 g, Rfl: 3    ARIPiprazole (ABILIFY) 10 MG Tab, Take 1 tablet (10 mg total) by mouth once daily., Disp: 30 tablet, Rfl: 2    aspirin (ECOTRIN) 81 MG EC tablet, Take 81 mg by mouth once daily., Disp: , Rfl:     atorvastatin (LIPITOR) 20 MG tablet, Take 1 tablet (20 mg total) by mouth every evening., Disp: 30 tablet, Rfl: 1    BD INSULIN SYRINGE ULTRA-FINE 1/2 mL 30 gauge x 1/2" Syrg, , Disp: , Rfl: 0    benztropine (COGENTIN) 1 MG tablet, Take 1 tablet (1 mg total) by mouth every evening., Disp: 30 tablet, Rfl: 1    blood sugar diagnostic Strp, Check blood glucose 4 times daily as directed and as needed (dispense insurance preferred brand or patient choice), Disp: 200 each, Rfl: 5    blood-glucose meter Misc, Use to check blood sugars, give meter based on insurance specification, Disp: 1 each, Rfl: 1    clonazePAM (KLONOPIN) 1 MG tablet, Take 1 tablet by mouth twice daily, Disp: 60 tablet, Rfl: 0    cyclobenzaprine (FLEXERIL) 10 MG tablet, Take 1 tablet (10 mg total) by mouth daily as needed (for back muscle spasms)., Disp: 30 tablet, Rfl: 2    cyclosporine 0.05 % Drop, Place 1 drop into both eyes 2 (two) times daily., Disp: 5.5 mL, Rfl: 11    DULoxetine (CYMBALTA) 60 MG capsule, Take 1 capsule (60 mg total) by mouth 2 (two) times daily., Disp: 60 capsule, Rfl: 1    ergocalciferol (ERGOCALCIFEROL) 50,000 unit Cap, Take 1 capsule (50,000 Units total) by mouth every 14 (fourteen) days., Disp: 4 capsule, Rfl: 5    exenatide microspheres (BYDUREON) 2 mg/0.65 mL PnIj, Inject 2 mg into the skin every 7 days., Disp: 4 each, Rfl: 3    fish oil-omega-3 fatty acids 300-1,000 mg " capsule, Take 1 capsule by mouth once daily., Disp: , Rfl:     furosemide (LASIX) 20 MG tablet, Take 1 tablet (20 mg total) by mouth once daily., Disp: 30 tablet, Rfl: 11    gabapentin (NEURONTIN) 300 MG capsule, Take 2 capsules (600 mg total) by mouth 3 (three) times daily., Disp: 90 capsule, Rfl: 3    glimepiride (AMARYL) 2 MG tablet, Take 2 tablets (4 mg total) by mouth before breakfast., Disp: 60 tablet, Rfl: 3    hydrALAZINE (APRESOLINE) 10 MG tablet, Take 1 tablet (10 mg total) by mouth every 12 (twelve) hours., Disp: 60 tablet, Rfl: 11    hydrOXYzine pamoate (VISTARIL) 25 MG Cap, Take 1 capsule by mouth every day, Disp: 30 capsule, Rfl: 0    insulin aspart U-100 (NOVOLOG FLEXPEN U-100 INSULIN) 100 unit/mL (3 mL) InPn pen, Inject before meals three times a day up to 30 units daily (Patient taking differently: Inject before meals three times a day up to 25 units daily), Disp: 15 mL, Rfl: 3    lancets 28 gauge Misc, use as directed 3 times daily, Disp: 100 each, Rfl: 11    linaCLOtide (LINZESS) 145 mcg Cap capsule, Take 1 capsule (145 mcg total) by mouth daily as needed (for constipation)., Disp: 30 capsule, Rfl: 11    lurasidone (LATUDA) 120 mg Tab, Take 1 tablet by mouth everyday at 5 pm with food, Disp: 30 tablet, Rfl: 0    magnesium oxide (MAG-OX) 400 mg (241.3 mg magnesium) tablet, Take 1 tablet (400 mg total) by mouth once daily., Disp: 90 tablet, Rfl: 3    metFORMIN (GLUCOPHAGE) 1000 MG tablet, Take 1 tablet (1,000 mg total) by mouth 2 (two) times daily with meals., Disp: 180 tablet, Rfl: 4    metoprolol succinate (TOPROL-XL) 100 MG 24 hr tablet, Take 2 tablets (200 mg total) by mouth every evening., Disp: 180 tablet, Rfl: 3    mirtazapine (REMERON) 45 MG tablet, Take 1 tablet by mouth every evevning, Disp: 30 tablet, Rfl: 0    mometasone (ASMANEX TWISTHALER) 220 mcg/ actuation (120) AePB, Inhale 2 puffs into the lungs 2 (two) times daily. Controller, Disp: 1 each, Rfl: 11    montelukast  "(SINGULAIR) 10 mg tablet, Take 1 tablet (10 mg total) by mouth every evening., Disp: 30 tablet, Rfl: 11    MULTIVIT,CALC,MINS/IRON/FOLIC (DAILY MULTIPLE FOR WOMEN ORAL), Take 1 tablet by mouth once daily., Disp: , Rfl:     pen needle, diabetic (BD ULTRA-FINE MINI PEN NEEDLE) 31 gauge x 3/16" Ndle, Use 5 a day, Disp: 200 each, Rfl: 3    promethazine (PHENERGAN) 25 MG tablet, , Disp: , Rfl:     spironolactone (ALDACTONE) 25 MG tablet, Take 1 tablet (25 mg total) by mouth once daily., Disp: 30 tablet, Rfl: 6    temazepam (RESTORIL) 15 mg Cap, Take 1 capsule by mouth at bedtime, Disp: 30 capsule, Rfl: 1    topiramate (TOPAMAX) 50 MG tablet, Take 1 tablet (50 mg total) by mouth 2 (two) times daily., Disp: 60 tablet, Rfl: 11    TOUJEO MAX U-300 SOLOSTAR 300 unit/mL (3 mL) InPn, Inject 96 units once daily at bedtime (Patient taking differently: Inject 90 Units into the skin once daily. Inject 96 units once daily at bedtime), Disp: 15 mL, Rfl: 3    valACYclovir (VALTREX) 1000 MG tablet, Take 1 tablet (1,000 mg total) by mouth once daily., Disp: 30 tablet, Rfl: 11    valsartan (DIOVAN) 320 MG tablet, Take 1 tablet (320 mg total) by mouth once daily., Disp: 90 tablet, Rfl: 3    zolpidem (AMBIEN) 10 mg Tab, Take 1 tablet by mouth at bedtime, Disp: 30 tablet, Rfl: 0  Review of patient's allergies indicates:   Allergen Reactions    Codeine Itching    Ibuprofen     Mobic [meloxicam]     Neuromuscular blockers, steroidal Hives     some    Latex, natural rubber Rash    Morphine Rash     itching    Norco [hydrocodone-acetaminophen] Itching, Rash and Hallucinations    Seconal [secobarbital sodium] Rash     itching    Tylox [oxycodone-acetaminophen] Rash     Review of Systems   Constitutional: Negative for chills, fever and weight loss.   Respiratory: Negative for shortness of breath.    Cardiovascular: Negative for chest pain and palpitations.       Objective:   Body mass index is 45.92 kg/m².  Vitals:    09/16/20 " 0814   BP: (!) 140/70   Pulse: 90           Ortho/SPM Exam  General - A&O, NAD.     Respiratory Effort - Normal    Extremity (Body Part) - right shoulder     -No acute deformity/swelling    ROM - abduction to 70°  TTP - diffuse tenderness to the entire shoulder area    Stability -negative sulcus sign    Strength - 3/5    Neurovascular Intact for shoulder.    Other - both wrist to have positive Tinel worse on the right.  Right wrist has definite Phalen test positive but negative on the left   strength is 3/5 bilateral    Psychiatric - Affect & Cognition WNL      Plan for Improvement - continue physical therapy for least 6 weeks to try to obtain improvement and right shoulder tendinitis and bilateral hand and wrist pain.  She will start wearing wrist splints daily in using Voltaren gel.    Will refer to orthopedic shoulder surgery if shoulders not improved after physical therapy      IMAGING: X-Ray Hand 3 View Bilateral  Narrative: EXAMINATION:  XR HAND COMPLETE 3 VIEWS BILATERAL    CLINICAL HISTORY:  . Pain in right wrist    TECHNIQUE:  PA, lateral, and oblique views of both hands were performed.    COMPARISON:  None    FINDINGS:  See also report from bilateral wrist series performed on same date.    Degenerative changes noted in the wrist most prominently involving the triscaphe joints.  Small area of decreased density within the right triquetrum suggesting cyst with sclerotic margins.    Mild degenerative changes noted bilaterally in the DIP joints.  No fracture or dislocation.  No discrete erosion or periosteal reaction.  No abnormal calcification or foreign body on the right.  Tiny equivocal calcific density projects over the dorsum of the left hand level of the distal 3rd of the 4th metacarpal.  Impression: As above.  Follow-up and or further evaluation as warranted    Electronically signed by: Julius Arreola MD  Date:    09/15/2020  Time:    10:14  X-Ray Wrist Complete Bilateral  Narrative: EXAMINATION:  XR  WRIST COMPLETE 3 VIEWS BILATERAL    CLINICAL HISTORY:  Pain in right wrist    TECHNIQUE:  PA, lateral, and oblique views of both wrists were performed.    COMPARISON:  May 19, 2020    FINDINGS:  Right wrist: Mild degenerative change noted in the wrist most prominent in the triscaphe joint.  No acute fracture or dislocation.  Small cyst noted in within the triquetrum with mild surrounding sclerosis.  Diffuse soft tissue prominence of the forearm, wrist and included hand.  No radiopaque foreign body.  No abnormal soft tissue calcification.    Left wrist: Similar mild degenerative changes in the wrist most prominently involving the triscaphe joint.  No fracture or dislocation.  No focal erosion or periosteal reaction.  Mild diffuse soft tissue prominence included upper extremity.  No soft tissue calcification or radiopaque foreign body.  Impression: As above.  Follow-up and or further evaluation as warranted.    Electronically signed by: Julius Arreola MD  Date:    09/15/2020  Time:    10:11       Radiographs / Imaging : Relevant imaging results reviewed by me and interpreted by me, discussed with the patient and / or family -radiographs reviewed by me and agree with the degenerative changes noted in the wrist bilaterally and discussed with patient.  No acute fracture dislocation.      Assessment:     Encounter Diagnoses   Name Primary?    Right shoulder tendonitis     Bilateral carpal tunnel syndrome     Diabetic polyneuropathy associated with type 2 diabetes mellitus Yes        Plan:   Diabetic polyneuropathy associated with type 2 diabetes mellitus    Right shoulder tendonitis    Bilateral carpal tunnel syndrome  -     EMG W/ ULTRASOUND AND NERVE CONDUCTION TEST 2 Extremities; Future        The patient verbalized good understanding of the medical issues discussed today and expressed appreciation for the care provided.  Patient was given the opportunity to ask questions and be an active participant in their medical  care. Patient had no further questions or concerns at this time.     The patient was encouraged to participate in appropriate physical activity.      Maikel Redmond M.D.  [facility]        This note was dictated using voice recognition software and may have sound a like errors.

## 2020-09-16 NOTE — PATIENT INSTRUCTIONS
Apply ice to affected area three times a day for (15) fifteen minutes for the next 48 hours, and reduce activity during that time.  Voltaren gel three times a day to affected area if recommended.  Oral medication if recommended.  Physical therapy if recommended at home or at clinic.  Keep recheck visit.    Wrist splints for both wrist    Physical therapy and then recheck with us in 6 weeks

## 2020-09-17 ENCOUNTER — TELEPHONE (OUTPATIENT)
Dept: INTERNAL MEDICINE | Facility: CLINIC | Age: 50
End: 2020-09-17

## 2020-09-17 ENCOUNTER — TELEPHONE (OUTPATIENT)
Dept: PULMONOLOGY | Facility: CLINIC | Age: 50
End: 2020-09-17

## 2020-09-17 DIAGNOSIS — M79.7 FIBROMYALGIA: Chronic | ICD-10-CM

## 2020-09-17 NOTE — TELEPHONE ENCOUNTER
----- Message from Kemi Mcgee sent at 9/17/2020  3:35 PM CDT -----  Regarding: Call back  Contact: Patient  Patient would like the nurse to give her a call back concerning an infection in the folds of her skin. Please  call at Ph .[phone] (home)

## 2020-09-17 NOTE — TELEPHONE ENCOUNTER
Spoke with patient and she stated that she is very raw between her thighs where they rub together. Per Dr. Beach I informed her to get some over the counter Lamisil cream and witch hazel and take a good shower with soap and water, then after she dries well, put on the witch hazel and then the Lamisil over that on the area. She verbalized understanding. Informed her to let us know if this treatment does not help.

## 2020-09-17 NOTE — TELEPHONE ENCOUNTER
Phoned pt to reschedule missed apt, pt did not like the wait time for the [place] or [facility] location. Apt mercedes to [facility] location on 9/24/2020      ----- Message from Kemi Mcgee sent at 9/17/2020  3:37 PM CDT -----  Regarding: Call back  Contact: Patient  Patient would like the nurse to give her a call back concerning a missed appointment at .[phone] ([place])

## 2020-09-18 ENCOUNTER — LAB VISIT (OUTPATIENT)
Dept: LAB | Facility: HOSPITAL | Age: 50
End: 2020-09-18
Attending: NURSE PRACTITIONER
Payer: MEDICAID

## 2020-09-18 ENCOUNTER — TELEPHONE (OUTPATIENT)
Dept: OBSTETRICS AND GYNECOLOGY | Facility: CLINIC | Age: 50
End: 2020-09-18

## 2020-09-18 ENCOUNTER — OFFICE VISIT (OUTPATIENT)
Dept: OBSTETRICS AND GYNECOLOGY | Facility: CLINIC | Age: 50
End: 2020-09-18
Payer: MEDICAID

## 2020-09-18 VITALS
HEIGHT: 56 IN | SYSTOLIC BLOOD PRESSURE: 108 MMHG | WEIGHT: 205 LBS | DIASTOLIC BLOOD PRESSURE: 70 MMHG | BODY MASS INDEX: 46.12 KG/M2

## 2020-09-18 DIAGNOSIS — Z11.3 SCREENING FOR STDS (SEXUALLY TRANSMITTED DISEASES): Primary | ICD-10-CM

## 2020-09-18 DIAGNOSIS — R06.02 SHORTNESS OF BREATH: ICD-10-CM

## 2020-09-18 DIAGNOSIS — Z11.3 SCREENING FOR STDS (SEXUALLY TRANSMITTED DISEASES): ICD-10-CM

## 2020-09-18 DIAGNOSIS — J45.41 MODERATE PERSISTENT ASTHMA WITH ACUTE EXACERBATION: Primary | ICD-10-CM

## 2020-09-18 DIAGNOSIS — K13.21 LEUKOPLAKIA OF TONGUE: ICD-10-CM

## 2020-09-18 DIAGNOSIS — L30.9 DERMATITIS: ICD-10-CM

## 2020-09-18 PROCEDURE — 99999 PR PBB SHADOW E&M-EST. PATIENT-LVL IV: CPT | Mod: PBBFAC,,, | Performed by: NURSE PRACTITIONER

## 2020-09-18 PROCEDURE — 99213 PR OFFICE/OUTPT VISIT, EST, LEVL III, 20-29 MIN: ICD-10-PCS | Mod: S$PBB,,, | Performed by: NURSE PRACTITIONER

## 2020-09-18 PROCEDURE — 99999 PR PBB SHADOW E&M-EST. PATIENT-LVL IV: ICD-10-PCS | Mod: PBBFAC,,, | Performed by: NURSE PRACTITIONER

## 2020-09-18 PROCEDURE — 86592 SYPHILIS TEST NON-TREP QUAL: CPT

## 2020-09-18 PROCEDURE — 99214 OFFICE O/P EST MOD 30 MIN: CPT | Mod: PBBFAC | Performed by: NURSE PRACTITIONER

## 2020-09-18 PROCEDURE — 99213 OFFICE O/P EST LOW 20 MIN: CPT | Mod: S$PBB,,, | Performed by: NURSE PRACTITIONER

## 2020-09-18 PROCEDURE — 86703 HIV-1/HIV-2 1 RESULT ANTBDY: CPT

## 2020-09-18 PROCEDURE — 36415 COLL VENOUS BLD VENIPUNCTURE: CPT

## 2020-09-18 RX ORDER — NYSTATIN 100000 [USP'U]/ML
4 SUSPENSION ORAL 4 TIMES DAILY
Qty: 80 ML | Refills: 0 | Status: SHIPPED | OUTPATIENT
Start: 2020-09-18 | End: 2020-09-24

## 2020-09-18 RX ORDER — CLOTRIMAZOLE AND BETAMETHASONE DIPROPIONATE 10; .64 MG/G; MG/G
CREAM TOPICAL
Qty: 15 G | Refills: 1 | Status: SHIPPED | OUTPATIENT
Start: 2020-09-18 | End: 2020-09-18

## 2020-09-18 RX ORDER — NYSTATIN 100000 [USP'U]/ML
4 SUSPENSION ORAL 4 TIMES DAILY
Qty: 80 ML | Refills: 0 | Status: SHIPPED | OUTPATIENT
Start: 2020-09-18 | End: 2020-09-18

## 2020-09-18 RX ORDER — GABAPENTIN 300 MG/1
600 CAPSULE ORAL 3 TIMES DAILY
Qty: 540 CAPSULE | Refills: 1 | Status: SHIPPED | OUTPATIENT
Start: 2020-09-18 | End: 2020-09-26 | Stop reason: SDUPTHER

## 2020-09-18 RX ORDER — CLOTRIMAZOLE AND BETAMETHASONE DIPROPIONATE 10; .64 MG/G; MG/G
CREAM TOPICAL
Qty: 15 G | Refills: 1 | Status: SHIPPED | OUTPATIENT
Start: 2020-09-18 | End: 2021-02-01 | Stop reason: SDUPTHER

## 2020-09-18 NOTE — TELEPHONE ENCOUNTER
----- Message from Rashmi Albrecht sent at 9/18/2020  9:27 AM CDT -----  Type:  Needs Medical Advice    Who Called:patient  Symptoms (please be specific): yeast in vaginal area   How long has patient had these symptoms:  2wks  Pharmacy name and phone #: [facility]  Would the patient rather a call back or a response via [facility]? Call back  Best Call Back Number: [phone]  Additional Information: pt need a script call into pharmacy

## 2020-09-18 NOTE — PROGRESS NOTES
CC: Yeast Infection   Yolanda Betts is a 50 y.o. female  presents with complaint of itching in bilateral groin area.   She is diabetic and it is not controlled. She has been diabetic for ten years  Also complain of mouth sores and would like some type of medication to resolve those. Mouth sores have been present for a year. States that sometimes Valtrex relieves pain but sores do not go away.  She has been diagnosed with mouth ulcers caused by Herpes virus   Past Medical History:   Diagnosis Date    Abnormal Pap smear of cervix     HPV genital warts    Anemia     Anxiety     Arthritis     Asthma 10/11/2016    Bipolar 1 disorder     Diabetes mellitus, type 2     Dyslipidemia associated with type 2 diabetes mellitus 2019    Genital warts     GERD (gastroesophageal reflux disease)     Herpes simplex virus (HSV) infection     Hyperlipidemia     Hypertension     Hypertension complicating diabetes 2019    Migraine with aura and without status migrainosus, not intractable 3/21/2016    Mild persistent asthma without complication 10/11/2016    Morbid obesity with body mass index (BMI) of 45.0 to 49.9 in adult 8/3/2017    Obstructive sleep apnea     ALEN (obstructive sleep apnea)     2L per N/C q HS    Schizoaffective disorder, bipolar type 2019    Seasonal allergic rhinitis due to pollen 2019    Type 2 diabetes mellitus with hyperglycemia, with long-term current use of insulin 2017    Type 2 diabetes mellitus with hyperglycemia, without long-term current use of insulin 2017     Past Surgical History:   Procedure Laterality Date    abcess removed right back      BELT ABDOMINOPLASTY  1996    BREAST SURGERY Bilateral     Reduction     SECTION      X 2    COLONOSCOPY      COLONOSCOPY N/A 2018    Procedure: colonoscopy for iron deficiency anemia;  Surgeon: Frankie Sanchez MD;  [facility];  Service: Endoscopy;  Laterality: N/A;  "   COLONOSCOPY N/A 2/28/2018    Procedure: COLONOSCOPY;  Surgeon: Frankie Sanchez MD;  Location: [facility] ENDO;  Service: Endoscopy;  Laterality: N/A;    HYSTERECTOMY      w/ BSO; hypermenorrhea    MOUTH SURGERY  1996    OOPHORECTOMY      hyst/bso, hypermenorrhea    ROBOT-ASSISTED CHOLECYSTECTOMY USING DA DARI XI N/A 1/3/2020    Procedure: XI ROBOTIC CHOLECYSTECTOMY;  Surgeon: Damir Driscoll MD;  Location: [facility] OR;  Service: General;  Laterality: N/A;    TOTAL REDUCTION MAMMOPLASTY      TUBAL LIGATION       Social History     Tobacco Use    Smoking status: Never Smoker    Smokeless tobacco: Never Used   Substance Use Topics    Alcohol use: Not Currently     Alcohol/week: 0.0 standard drinks     Comment: socially  No alcohol 72h prior to sx    Drug use: No     Family History   Problem Relation Age of Onset    Diabetes Mother     Hyperlipidemia Mother     Hypertension Mother     Asthma Mother     COPD Mother     Glaucoma Mother     Thyroid disease Mother     Anesthesia problems Mother         "almost had a cardiac arrest" , blood clots    Hypertension Father     Hyperlipidemia Father     Cancer Father         Brain, lung, liver, kidney    Heart disease Maternal Grandmother     Hyperlipidemia Maternal Grandmother     Hypertension Maternal Grandmother     Cataracts Maternal Grandmother     Diabetes Maternal Grandmother     Heart disease Maternal Grandfather     Hyperlipidemia Maternal Grandfather     Hypertension Maternal Grandfather     Glaucoma Maternal Grandfather     Cancer Maternal Grandfather     Cataracts Maternal Grandfather     Macular degeneration Maternal Grandfather     Diabetes Maternal Grandfather     Heart disease Paternal Grandmother     Hyperlipidemia Paternal Grandmother     Hypertension Paternal Grandmother     Cataracts Paternal Grandmother     Heart disease Paternal Grandfather     Hyperlipidemia Paternal Grandfather     Hypertension Paternal " "Grandfather     Cataracts Paternal Grandfather     Breast cancer Maternal Cousin     Breast cancer Maternal Cousin     Breast cancer Maternal Cousin     Breast cancer Maternal Cousin      OB History    Para Term  AB Living   4 4 2 2   2   SAB TAB Ectopic Multiple Live Births           2      # Outcome Date GA Lbr Nirmal/2nd Weight Sex Delivery Anes PTL Lv   4  96 34w0d   F CS-LTranv EPI  DANIELE   3  94 32w0d   F CS-LTranv EPI  DANIELE   2 Term            1 Term                /70   Ht 4' 8" (1.422 m)   Wt 93 kg (205 lb 0.4 oz)   BMI 45.97 kg/m²     ROS:  Review of Systems   Genitourinary:        Bilateral groin itching   White patches to tongue          PHYSICAL EXAM:  Physical Exam  Constitutional:       Appearance: Normal appearance.   Genitourinary:      Pelvic exam was performed with patient supine.         Neurological:      Mental Status: She is alert.       White patches on tongue     ASSESSMENT and PLAN:    ICD-10-CM ICD-9-CM    1. Screening for STDs (sexually transmitted diseases)  Z11.3 V74.5 HIV 1/2 Ag/Ab (4th Gen)      C. trachomatis/N. gonorrhoeae by AMP DNA [facility]; Urine      RPR   2. Dermatitis  L30.9 692.9 clotrimazole-betamethasone 1-0.05% (LOTRISONE) cream   3. Leukoplakia of tongue  K13.21 528.6 nystatin (MYCOSTATIN) 100,000 unit/mL suspension   Recommend client see PCP for white patched on her tongue.  Prescription written one time only for nystatin oral suspension anymore refills will have to be written by PCP      "

## 2020-09-18 NOTE — TELEPHONE ENCOUNTER
Called patient and scheduled appointment today.  Visitor's policy and check in procedure explained and patient verbalized understanding.

## 2020-09-19 LAB — RPR SER QL: NORMAL

## 2020-09-21 ENCOUNTER — CLINICAL SUPPORT (OUTPATIENT)
Dept: REHABILITATION | Facility: HOSPITAL | Age: 50
End: 2020-09-21
Payer: MEDICAID

## 2020-09-21 ENCOUNTER — TELEPHONE (OUTPATIENT)
Dept: ENDOCRINOLOGY | Facility: CLINIC | Age: 50
End: 2020-09-21

## 2020-09-21 DIAGNOSIS — R29.3 POOR POSTURE: ICD-10-CM

## 2020-09-21 DIAGNOSIS — M62.81 PROXIMAL MUSCLE WEAKNESS: ICD-10-CM

## 2020-09-21 DIAGNOSIS — M25.611 DECREASED RIGHT SHOULDER RANGE OF MOTION: ICD-10-CM

## 2020-09-21 PROCEDURE — 97110 THERAPEUTIC EXERCISES: CPT

## 2020-09-21 NOTE — TELEPHONE ENCOUNTER
Need to check her blood sugar levels     Has yeast on side that she is putting nystatin cream   On wants to know what to do   ----- Message from Pati Solorzano sent at 9/21/2020 10:37 AM CDT -----  Good morning,      Pt would like the nurse to call back in regards to a yeast in her skin and blood sugar levels. Pt can be reached at [phone]      Thanks,  Pati Solorzano

## 2020-09-21 NOTE — PROGRESS NOTES
Physical Therapy Daily Treatment Note     Name: Yolanda Martin [facility]  Clinic Number: [id number]    Therapy Diagnosis:   Encounter Diagnoses   Name Primary?    Proximal muscle weakness     Poor posture     Decreased right shoulder range of motion      Physician: Maikel Redmond MD    Visit Date: 9/21/2020    Physician Orders: PT Eval and Treat  Medical Diagnosis from Referral: R shoulder pain  Evaluation Date: 8/31/2020  Authorization Period Expiration: 12/31/2020  Plan of Care Expiration: 11/23/2020  Visit # / Visits authorized: 2 (1 of 20)      Precautions: Standard, Diabetes and Bipolar disored, schizoaffective disorder    Time In: 11:45 am  Time Out: 12:30 pm  Total Billable Time: 45 minutes    SUBJECTIVE     Pt reports: no significant changes since initial evaluation. States that her recent aspiration did not work and that the doctor will have to go back in to pull more fluid out   She N/A compliant with home exercise program.  Response to previous treatment: N/A  Functional change: None noted    Pre-Treatment Pain: 8/10  Post-Treatment Pain: 8/10  Location: R shoulder  TREATMENT     Yolanda received therapeutic exercises to develop strength, ROM, posture and core stabilization for 25 minutes including:    Exercise 9/21/2020   Rope and pulley  3 minutes each   Flexion, extension, external rotation    Upper trapezius stretch  2 x 1 minute on R    Elbow flexion and extension AROM  2 x 1 on R   Supine    Shoulder flexion table slides  10x on R for HEP demonstration    R shoulder PROM  Flexion, abduction, IR and ER   ~8 minutes                    Yolanda received the following manual therapy techniques: Joint mobilizations and Soft tissue Mobilization were applied to the: R shoulder for 20 minutes, including:  STM of right upper trapezius, biceps brachii, deltoid, supraspinatus , infraspinatus , teres major and minor , subscapularis  and periscapular musculature  Grade II glenohumeral distraction     Home Exercises  Provided and Patient Education Provided     Education/Self-Care provided:    Patient educated on biomechanical justification for therapeutic exercise and importance of compliance with HEP in order to improve overall impairments and QOL    Patient educated on the importance of improved core and upper extremity strength in order to improve alignment of the spine and upper extremities with static positions and dynamic movement.     Written Home Exercises Provided: yes.  Exercises were reviewed and Yolanda was able to demonstrate them prior to the end of the session.  Yolanda demonstrated good  understanding of the education provided.     See EMR under Patient Instructions for exercises provided 9/21/2020.    ASSESSMENT   Pt tolerated manual therapy well with reports of decreased pain and tension in musculature following intervention. Pt tolerated exercise well with reports of increased fatigue and tolerable discomfort in shoulder throughout session. Pt demonstrated good understanding of exercises and required minimal cueing to maintain proper form following initial exercise demonstration.      Yolanda is progressing well towards her goals.   Pt prognosis is Fair.     Pt will continue to benefit from skilled outpatient physical therapy to address the deficits listed in the problem list box on initial evaluation, provide pt/family education and to maximize pt's level of independence in the home and community environment.     Pt's spiritual, cultural and educational needs considered and pt agreeable to plan of care and goals.     Anticipated Barriers for therapy: co-morbidities, sedentary lifestyle, chronicity of condition, lack of understanding of condition and adherence to treatment plan    GOALS:     Short Term Goals:  6 weeks     1. Pain: Pt will demonstrate improved pain by reports of less than or equal to 7/10 worst pain on the verbal rating scale in order to progress toward maximal functional ability and improve QOL.      2. Function: Patient will demonstrate improved function as indicated by a functional limitation score of less than or equal to 58 out of 100 on FOTO.     3. Mobility: Patient will improve AROM to 50% of stated goals, listed in objective measures above, in order to progress towards independence with functional activities.      4. Strength: Patient will improve strength to 50% of stated goals, listed in objective measures above, in order to progress towards independence with functional activities.      5. HEP: Patient will demonstrate independence with HEP in order to progress toward functional independence.        Long Term Goals:  12 weeks     1. Pain: Pt will demonstrate improved pain by reports of less than or equal to 5/10 worst pain on the verbal rating scale in order to progress toward maximal functional ability and improve QOL.       2. Function: Patient will demonstrate improved function as indicated by a functional limitation score of less than or equal to 48 out of 100 on FOTO.     3. Mobility: Patient will improve AROM to stated goals, listed in objective measures above, in order to return to maximal functional potential and improve quality of life.     4. Strength: Patient will improve strength to stated goals, listed in objective measures above, in order to improve functional independence and quality of life.     5. Patient will return to normal ADL's, IADL's, community involvement, recreational activities, and work-related activities with less than or equal to 5/10 pain and maximal function.             PLAN   Continue Plan of Care (POC) and progress per patient tolerance.    Evaluation: 8/23/2020  POC Expiration: 11/23/2020    Kathy Sanders, PT, DPT

## 2020-09-22 ENCOUNTER — OFFICE VISIT (OUTPATIENT)
Dept: ENDOCRINOLOGY | Facility: CLINIC | Age: 50
End: 2020-09-22
Payer: MEDICAID

## 2020-09-22 DIAGNOSIS — K76.0 FATTY LIVER: ICD-10-CM

## 2020-09-22 DIAGNOSIS — I10 ESSENTIAL HYPERTENSION: ICD-10-CM

## 2020-09-22 DIAGNOSIS — R74.8 ELEVATED LIVER ENZYMES: ICD-10-CM

## 2020-09-22 LAB — HIV 1+2 AB+HIV1 P24 AG SERPL QL IA: NEGATIVE

## 2020-09-22 PROCEDURE — 99213 OFFICE O/P EST LOW 20 MIN: CPT | Mod: 95,,, | Performed by: INTERNAL MEDICINE

## 2020-09-22 PROCEDURE — 99213 PR OFFICE/OUTPT VISIT, EST, LEVL III, 20-29 MIN: ICD-10-PCS | Mod: 95,,, | Performed by: INTERNAL MEDICINE

## 2020-09-22 NOTE — Clinical Note
Schedule three-month appointment with Brittaney Sainz and please prepare paperwork for dexcom sensor

## 2020-09-22 NOTE — PROGRESS NOTES
Established Patient - Audio Only Telehealth Visit     The patient location is:  Home  The chief complaint leading to consultation is:  Diabetes  Visit type: Virtual visit with audio only (telephone)  Total time spent with patient:  20 min       The reason for the audio only service rather than synchronous audio and video virtual visit was related to technical difficulties or patient preference/necessity.     Each patient to whom I provide medical services by telemedicine is:  (1) informed of the relationship between the physician and patient and the respective role of any other health care provider with respect to management of the patient; and (2) notified that they may decline to receive medical services by telemedicine and may withdraw from such care at any time. Patient verbally consented to receive this service via voice-only telephone call.       HPI:      Diabetes  Pertinent negatives for hypoglycemia include no confusion, dizziness or headaches. Pertinent negatives for diabetes include no chest pain, no fatigue, no polydipsia, no polyphagia, no polyuria and no weakness.   Follow-up  Associated symptoms include nausea. Pertinent negatives include no arthralgias, chest pain, fatigue, fever, headaches, joint swelling, numbness, rash, sore throat, vomiting or weakness.       Yolanda Betts is here for follow-up  of type 2 Diabetes Mellitus    Consultation requested by Dr. Ermias Mccormick    PCP: CHARLA Beach MD      Diagnosed:  Around 2000 and has been on insulin several years.  Had better control earlier on around her diagnosis  Saw Millie Pruitt 2/2018 saw endocrinologist once in 2016, Dr. Mejia    She had a normal C-peptide April 2018 4.56    Interval history:  At last visit she was having significant hyperglycemia but was also having problems with nausea and upset stomach, she is followed by gastroenterology who diagnosed her with irritable bowel syndrome.  At last visit I told her to stop the  Victoza because of the nausea as well as bouts of diarrhea that she was having  and also added glimepiride and Bydureon.  Sometime after visit she called complaining of higher sugars again and I increased her glimepiride to 4 mg and she had complaints again of recurrent yeast infection  We ended up reducing the glimepiride back to 2 mg because of her experiencing low sugars    In review of digital blood sugars in epic overall her average sugars have much improved but has had some fluctuations from  but for the most part have been more near goal than in the past, was 243 this morning and has eaten breakfast and given her insulin, did not take glimepiride, is feeling dizzy and weak and still having occasional nausea    She has reported some weight loss and decreased appetite since being on Bydureon    Patient has history of elevated liver enzymes, in the past abdominal ultrasound did show fatty liver and gallstones      Past Medical History:   Diagnosis Date    Anemia     Anxiety     Arthritis     Asthma 10/11/2016    Bipolar 1 disorder     Diabetes mellitus, type 2     Dyslipidemia associated with type 2 diabetes mellitus 5/13/2019    GERD (gastroesophageal reflux disease)     Hypertension     Hypertension complicating diabetes 5/5/2019    Mild persistent asthma without complication 10/11/2016    Morbid obesity with body mass index (BMI) of 45.0 to 49.9 in adult 8/3/2017    Obstructive sleep apnea     Schizoaffective disorder, bipolar type 4/25/2019    Seasonal allergic rhinitis due to pollen 5/13/2019    Type 2 diabetes mellitus with hyperglycemia, without long-term current use of insulin 12/21/2017       Lab Results   Component Value Date    HGBA1C 11.6 (H) 07/07/2020    HGBA1C 7.1 (H) 02/18/2020    HGBA1C 9.9 (H) 11/18/2019     Hemoglobin A1C   Date Value Ref Range Status   06/24/2019 7.7 (H) 4.0 - 5.6 % Final     Comment:     ADA Screening Guidelines:  5.7-6.4%  Consistent with  prediabetes  >or=6.5%  Consistent with diabetes  High levels of fetal hemoglobin interfere with the HbA1C  assay. Heterozygous hemoglobin variants (HbS, HgC, etc)do  not significantly interfere with this assay.   However, presence of multiple variants may affect accuracy.       Lab Results   Component Value Date    HGBA1C 8.3 (H) 12/22/2017    HGBA1C 8.5 (H) 01/31/2017    HGBA1C 6.6 (H) 03/22/2016       Diabetes medications include: Toujeo   90 units qhs  Novolog 25 units tid  Metformin  1000mg bid and glimepiride 2 mg daily and Bydureon once a week    Off Victoza  Off Jardiance as she developed multiple yeast infections     Tried glyburide in the past  Previously was on Levemir    She also developed an abscess on the right flank that has been irrigated and debrided twice.  Still has abdominal distention and complains of pelvic discomfort as well as discomfort in her flank area; her gyn did order the CT of the abdomen and this was denied by her insurance; her liver function tests on her last evaluation in October 2019 were elevated; she denies any alcohol use.  I ordered an ultrasound that showed fatty liver      She is being followed by Cardiology for history of tachycardia and an echocardiogram and Holter were ordered and also has hypertension    History of obstructive sleep apnea but not using CPAP    Diabetes Complications:peripheral neuropathy  Toes and fingers have numbness and Cymbalta helps she says  Hypoglycemia: NO      Meal Planning:  She has struggled to maintain a healthy diet, trying to improve this    Lab Results   Component Value Date    POCGLU 269 (A) 11/11/2019       Diabetes education: YES:  2019 has started seeing diabetes educator    SMBG: Tests BG four times a day    Log or meter available:  Verbal report  Home values if available:  FBG:  Reviewed her digit early entered sugars and they have been running in the 200sPre-lunch:  Pre-dinner:  Bedtime:  Post Breakfast:  Post Lunch  Post  Dinner  Ranges:      Exercise: No     STANDARDS of CARE:        ACE inhibitor or angiotensin II receptor blocker:  Yes        Statin drug:  Yes        Eye exam within last year: Yes        Influenza vaccine up to date: Yes        Pneumonia vaccine:yes         No family history of medullary thyroid cancer and no history of pancreatitis     I have reviewed the past medical, family and social history      Assessment and plan:  Yolanda was seen today for diabetes.    Diagnoses and all orders for this visit:    Type 2 diabetes, uncontrolled, with neuropathy    Essential hypertension    Elevated liver enzymes    Fatty liver     Overall her blood sugars on average have improved and I can not explain why she has been feeling weak and dizzy.  May need to have a physical exam, consider dehydration.  Encouraged her to drink fluids and she plans to call her PCP.  Because of fluctuations of blood sugars and continued need for adjusting her diabetic medications I think she would be a great candidate for a sensor and she has new insurance that hopefully this time will approved a sensor                        This service was not originating from a related E/M service provided within the previous 7 days nor will  to an E/M service or procedure within the next 24 hours or my soonest available appointment.  Prevailing standard of care was able to be met in this audio-only visit.

## 2020-09-23 ENCOUNTER — TELEPHONE (OUTPATIENT)
Dept: INTERNAL MEDICINE | Facility: CLINIC | Age: 50
End: 2020-09-23

## 2020-09-23 NOTE — TELEPHONE ENCOUNTER
Spoke with pt regarding her getting a earlier appointment pt states that she is experiencing yeast in folds,dizziness,light headiness,migraines that won't go away for 3 to 4 days and would like an earlier appointment.Pt is schedule for 1/14/2020.//LB

## 2020-09-23 NOTE — PROGRESS NOTES
Digital Medicine: Clinician Follow-Up    Called patient for follow up. She is being followed closely by endocrine and diabetes education at this time.     Digital medicine has taken a step back regarding her diabetes as multiple medications changes have occurred recently and did not want to interfere/have dual providers adjusting.     She saw endo yesterday - BG is much improved, remains elevated at times. She complains of dizziness and and lightheaded x 2 days - comes and goes, discussed with Dr. Severino during virtual visit who recommended patient make an in-person visit with her or PCP for further assessment. Recent dx IBS. She endorses hydration.    She has not checked her BP since  9/12/20, stating she is focused on her glucose right now. Encouraged her to remain focused on all aspects of her health, and to check her blood pressure today given symptoms that may be related to hypotension.     Also discussed her getting used to her more controlled blood glucose range. She states she is going to try to make an appointment with her PCP.     The history is provided by the patient.      Review of patient's allergies indicates:   -- Codeine -- Itching   -- Ibuprofen    -- Mobic (meloxicam)    -- Neuromuscular blockers, steroidal -- Hives    --  some   -- Latex, natural rubber -- Rash   -- Morphine -- Rash    --  itching   -- Norco (hydrocodone-acetaminophen) -- Itching, Rash and Hallucinations   -- Seconal (secobarbital sodium) -- Rash    --  itching   -- Tylox (oxycodone-acetaminophen) -- Rash  Follow-up reason(s): routine follow up.     Diabetes    Readings are trending down   Patient is experiencing signs/symptoms of hypotension. Dizzy, lightheaded  Patient is not experiencing signs/symptoms of hypertension.  Patient is not experiencing signs/symptoms of hypoglycemia.  Patient is not experiencing signs/symptoms of hyperglycemia.    Patient did make medication change.    Is patient tolerating med change?  yes          Last 5 Patient Entered Readings                                      Current 30 Day Average: 134/84     Recent Readings 9/12/2020 9/11/2020 9/10/2020 8/10/2020 7/29/2020    SBP (mmHg) 136 140 125 137 145    DBP (mmHg) 80 88 84 87 96    Pulse 101 99 95 91 95        Last 6 Patient Entered Readings                                          Most Recent A1c: 11.6% on 7/7/2020  (Goal: 7%)     Recent Readings 9/23/2020 9/22/2020 9/22/2020 9/22/2020 9/22/2020    Blood Glucose (mg/dL) 112 189 109 246 243               Depression Screening  Did not address depression screening.    Sleep Apnea Screening    Did not address sleep apnea screening.     Medication Affordability Screening  Did not address medication affordability screening.     Medication Adherence-Medication adherence was assessed.          ASSESSMENT(S)  Patients BP average is 134/84 mmHg, which is at goal. Patient's BP goal is less than or equal to 130/80 per 2017 ACC/AHA Hypertension Guidelines.  Patient's A1C goal is less than or equal to 7 per 2020 ADA guidelines. Patient's most recent A1C result is above goal. Lab Results    Component                Value               Date                     HGBA1C                   11.6 (H)            07/07/2020          .        Great improvement in SMBG.   Endo is considering a personal CGM.       Hypertension Plan  Additional monitoring needed.  Continue current therapy.    Diabetes Plan  Continue current therapy.       Addressed patient questions and patient has my contact information if needed prior to next outreach. Patient verbalizes understanding.      Explained the importance of self-monitoring and medication adherence. Encouraged the patient to communicate with their health  for lifestyle modifications to help improve or maintain a healthy lifestyle.        Explained to the patient that the Digital Medicine team is not available for emergencies. Advised patient call [facility]  ([phone] or [phone]) or [phone] if needed.           There are no preventive care reminders to display for this patient.  There are no preventive care reminders to display for this patient.    Hypertension Medications             amLODIPine (NORVASC) 10 MG tablet Take 1 tablet (10 mg total) by mouth once daily.    amLODIPine (NORVASC) 10 MG tablet Take 1 tablet (10 mg total) by mouth every evening.    amLODIPine (NORVASC) 5 MG tablet Take 1 tablet (5 mg total) by mouth every evening.    furosemide (LASIX) 20 MG tablet Take 1 tablet (20 mg total) by mouth once daily.    hydrALAZINE (APRESOLINE) 10 MG tablet Take 1 tablet (10 mg total) by mouth every 12 (twelve) hours.    metoprolol succinate (TOPROL-XL) 100 MG 24 hr tablet Take 2 tablets (200 mg total) by mouth every evening.    spironolactone (ALDACTONE) 25 MG tablet Take 1 tablet (25 mg total) by mouth once daily.    valsartan (DIOVAN) 320 MG tablet Take 1 tablet (320 mg total) by mouth once daily.        Diabetes Medications             exenatide microspheres (BYDUREON) 2 mg/0.65 mL PnIj Inject 2 mg into the skin every 7 days.    glimepiride (AMARYL) 2 MG tablet Take 2 tablets (4 mg total) by mouth before breakfast.    insulin aspart U-100 (NOVOLOG FLEXPEN U-100 INSULIN) 100 unit/mL (3 mL) InPn pen Inject before meals three times a day up to 30 units daily    metFORMIN (GLUCOPHAGE) 1000 MG tablet Take 1 tablet (1,000 mg total) by mouth 2 (two) times daily with meals.    TOUJEO MAX U-300 SOLOSTAR 300 unit/mL (3 mL) InPn Inject 90 Units into the skin once daily. Inject 96 units once daily at bedtime

## 2020-09-24 ENCOUNTER — TELEPHONE (OUTPATIENT)
Dept: INTERNAL MEDICINE | Facility: CLINIC | Age: 50
End: 2020-09-24

## 2020-09-24 ENCOUNTER — IMMUNIZATION (OUTPATIENT)
Dept: INTERNAL MEDICINE | Facility: CLINIC | Age: 50
End: 2020-09-24
Payer: MEDICAID

## 2020-09-24 ENCOUNTER — OFFICE VISIT (OUTPATIENT)
Dept: PULMONOLOGY | Facility: CLINIC | Age: 50
End: 2020-09-24
Payer: MEDICAID

## 2020-09-24 VITALS
HEIGHT: 56 IN | WEIGHT: 205.94 LBS | HEART RATE: 100 BPM | SYSTOLIC BLOOD PRESSURE: 118 MMHG | DIASTOLIC BLOOD PRESSURE: 68 MMHG | BODY MASS INDEX: 46.33 KG/M2 | OXYGEN SATURATION: 92 % | RESPIRATION RATE: 20 BRPM

## 2020-09-24 DIAGNOSIS — E66.01 MORBID OBESITY WITH BMI OF 45.0-49.9, ADULT: ICD-10-CM

## 2020-09-24 DIAGNOSIS — Z09 NEED FOR CASE MANAGEMENT FOLLOW-UP: Primary | ICD-10-CM

## 2020-09-24 DIAGNOSIS — E66.2 HYPOVENTILATION ASSOCIATED WITH OBESITY SYNDROME: ICD-10-CM

## 2020-09-24 DIAGNOSIS — G47.00 INSOMNIA, UNSPECIFIED TYPE: Primary | ICD-10-CM

## 2020-09-24 DIAGNOSIS — J45.30 MILD PERSISTENT ASTHMA WITHOUT COMPLICATION: Chronic | ICD-10-CM

## 2020-09-24 PROCEDURE — 90471 IMMUNIZATION ADMIN: CPT | Mod: PBBFAC,PO

## 2020-09-24 PROCEDURE — 99214 PR OFFICE/OUTPT VISIT, EST, LEVL IV, 30-39 MIN: ICD-10-PCS | Mod: S$PBB,,, | Performed by: NURSE PRACTITIONER

## 2020-09-24 PROCEDURE — 99215 OFFICE O/P EST HI 40 MIN: CPT | Mod: PBBFAC,PO,25 | Performed by: NURSE PRACTITIONER

## 2020-09-24 PROCEDURE — 99214 OFFICE O/P EST MOD 30 MIN: CPT | Mod: S$PBB,,, | Performed by: NURSE PRACTITIONER

## 2020-09-24 PROCEDURE — 99999 PR PBB SHADOW E&M-EST. PATIENT-LVL V: ICD-10-PCS | Mod: PBBFAC,,, | Performed by: NURSE PRACTITIONER

## 2020-09-24 PROCEDURE — 99999 PR PBB SHADOW E&M-EST. PATIENT-LVL V: CPT | Mod: PBBFAC,,, | Performed by: NURSE PRACTITIONER

## 2020-09-24 NOTE — TELEPHONE ENCOUNTER
ACTION NEEDED: Please schedule her for appointment. Schedule override approved on or after October 5.

## 2020-09-24 NOTE — TELEPHONE ENCOUNTER
----- Message from Olesya Juárez sent at 9/24/2020  3:01 PM CDT -----  Regarding: light headed dizziness, headache  Type:  Needs Medical Advice    Who Called: pt  Symptoms (please be specific): dizziness, headaches  How long has patient had these symptoms:  n/a  Pharmacy name and phone #:  n/a  Would the patient rather a call back or a response via [facility]? Call back  Best Call Back Number: [phone]  Additional Information: Please call back.Thanks

## 2020-09-24 NOTE — PATIENT INSTRUCTIONS
"Best way to obtain adequate sleep:  Avoid caffeine in the afternoon/evening time.  Avoid alcohol. Alcohol is a sedative that blocks your REM sleep    Stimulus control -- Stimulus control therapy is based on the idea that some people with insomnia have learned to associate the bedroom with staying awake rather than sleeping.  ?You should spend no more than 20 minutes lying in bed trying to fall asleep.  ?If you cannot fall asleep within 20 minutes, get up, go to another room and read or find another relaxing activity until you feel sleepy again. Activities such as eating, balancing your checkbook, doing housework, watching TV, or studying for a test, which "reward" you for staying awake, should be avoided.  ?When you start to feel sleepy, you can return to bed. If you cannot fall asleep in another 20 minutes, repeat the process.  ?Set an alarm clock and get up at the same time every day, including weekends.  ?Do not take a nap during the day.  You may not sleep much on the first night. However, sleep is more likely on succeeding nights because sleepiness is increased and naps are not allowed.    Restrict time in bedroom to 8 hours. Some people with insomnia have long awakenings during the night and some try to deal with their poor sleep by staying in bed longer in the morning to "make up" some of their lost sleep. This additional sleep later in the morning may make it more difficult to fall asleep that night, resulting in the need to stay in bed even longer the following morning. This restriction should consolidate sleep and breaks this cycle.  Do not attempt to go to bed until you are awake for 16 hours.   Do not go to bed if you are not sleepy.  Do not go to bedroom until it is time to go to sleep.    One main reason for insomnia today is electronics. No TV or electronics in the bedroom. Associate the bedroom to only sleep and sex.   All electronic use outside the bedroom. Stop using electronics 1-2 hours before " "bedtime. The electronics have blue lights which have a profound suppressive effect on your natural melatonin which assist with sleep. If your phone or pad has a "night shift" option, change to that 2 hours before bedtime. This makes the light on the phone a yellow softer light. Check your settings-display and brightness-night shift. When we use electronics, we also tend to delay sleep time by getting involved in a game or social media. Set a timer when it is ready to go to bed and stick with it.   Dim lights an hour before bedtime.  Meditation and warm bath helps with preparing for sleep.  Make bedroom cool and dark. White noise helps some people sleep more soundly.  Sleep should be nonnegotiable . Give yourself at least 8 hours of uninterrupted sleep nightly for your health and well-being.          "

## 2020-09-24 NOTE — TELEPHONE ENCOUNTER
----- Message from Reba Dominguez sent at 9/24/2020 11:46 AM CDT -----  Type:  Needs Medical Advice    Who Called:  Pt  Yolanda   Symptoms (please be specific):   States she had left a message yesterday and did not get a return call//she is having a problem with dizziness and lightheadedness and a headache that will not go away    How long has patient had these symptoms:   almost 2  weeks   Pharmacy name and phone #:     Would the patient rather a call back or a response via [facility]?   Call back   Best Call Back Number:   [phone]   Additional Information:  please call to discuss//thanks/lh

## 2020-09-24 NOTE — TELEPHONE ENCOUNTER
Spoke with pt regarding dizziness lightheadedness and headaches that won't go away for two to three weeks.//LB

## 2020-09-26 DIAGNOSIS — E83.42 HYPOMAGNESEMIA: ICD-10-CM

## 2020-09-26 DIAGNOSIS — M79.7 FIBROMYALGIA: Chronic | ICD-10-CM

## 2020-09-28 ENCOUNTER — CLINICAL SUPPORT (OUTPATIENT)
Dept: REHABILITATION | Facility: HOSPITAL | Age: 50
End: 2020-09-28
Payer: MEDICAID

## 2020-09-28 DIAGNOSIS — M62.81 PROXIMAL MUSCLE WEAKNESS: ICD-10-CM

## 2020-09-28 DIAGNOSIS — R29.3 POOR POSTURE: ICD-10-CM

## 2020-09-28 DIAGNOSIS — M25.611 DECREASED RIGHT SHOULDER RANGE OF MOTION: ICD-10-CM

## 2020-09-28 PROCEDURE — 97110 THERAPEUTIC EXERCISES: CPT | Mod: CQ

## 2020-09-28 RX ORDER — LANOLIN ALCOHOL/MO/W.PET/CERES
400 CREAM (GRAM) TOPICAL DAILY
Qty: 90 TABLET | Refills: 3 | Status: SHIPPED | OUTPATIENT
Start: 2020-09-28 | End: 2022-01-18 | Stop reason: SDUPTHER

## 2020-09-28 RX ORDER — INSULIN ASPART 100 [IU]/ML
INJECTION, SOLUTION INTRAVENOUS; SUBCUTANEOUS
Qty: 15 ML | Refills: 3 | Status: SHIPPED | OUTPATIENT
Start: 2020-09-28 | End: 2020-10-01 | Stop reason: SDUPTHER

## 2020-09-28 NOTE — PROGRESS NOTES
Physical Therapy Progress Note     Name: Yolanda Betts  Clinic Number: [id number]    Therapy Diagnosis:   Encounter Diagnoses   Name Primary?    Proximal muscle weakness     Poor posture     Decreased right shoulder range of motion      Physician: Maikel Redmond MD    Visit Date: 9/28/2020    Physician Orders: PT Eval and Treat  Medical Diagnosis from Referral: R shoulder pain  Evaluation Date: 8/31/2020  Authorization Period Expiration: 12/31/2020  Plan of Care Expiration: 11/23/2020  Visit # / Visits authorized: 3 (2 of 20)      Precautions: Standard, Diabetes and Bipolar disored, schizoaffective disorder    Time In: 10:45 am  Time Out: 11:30 pm  Total Billable Time: 40 minutes    SUBJECTIVE     Pt reports: Patient states she still is having pain that has not improved. since initial evaluation.    She is compliant with home exercise program but demonstrates decreased range of motion with technique used for table top slides in shoulder flexion and decreased range with cervical side bending.  Response to previous treatment: N/A  Functional change: None noted    Pre-Treatment Pain: 8/10  Post-Treatment Pain: 8/10  Location: R shoulder and scapular       OBJECTIVE        RANGE OF MOTION:     Cervical Right   (spine)  8/31/2020 Left      8/31/2020 Pain/Dysfunction with Movement Goal Right 9/28/2020 Left 9/28/2020   Cervical Flexion (60) 38 --- B posterior neck and shoulders   29 --   Cervical Extension (90) 20 --- Posterior neck    37 --   Cervical Side Bending (45) 32 20 Pain across B shoulders    24 26   Cervical Rotation (75) 60 50 Increased pain in B shoulders with movement to L    42 54           Shoulder AROM Right  8/31/2020 Left  8/31/2020 Pain/Dysfunction with Movement Goal Right 9/28/2020 Left 9/28/2020    Shoulder Flexion (180) 90 160     118 143   Shoulder Extension (60) 15 30     37 48   Shoulder Abduction (180) 90 145     80 active 110 passive 127   Shoulder ER (90) R ear C7     92 90    Shoulder IR (70) L2 T12     62 63           Passive shoulder range of motion not tolerated B due to fear of pain and muscle guarding         STRENGTH:     U/E MMT Right  8/31/2020 Left  8/31/2020 Pain/Dysfunction with Movement Goal Right 9/28/2020 Left 9/28/2020   Shoulder Flexion 3-/5 4-/5   5/5 B 2+ 4-   Shoulder Extension 3-/5 4-/5   5/5 B 3+ 4-   Shoulder Abduction 3-/5 4-/5 Pain on L, pain on R  5/5 B 3- 4-   Shoulder IR 3+/5 4-/5 Pain on L, pain on R  5/5 B 4- 4   Shoulder ER    3+/5 4-/5 Pain on R  5/5 B 3+ 4   Serratus Anterior 3-/5 3-/5   5/5 B 3- 3-   Middle Trapezius    2+/5 2+/5   5/5 B 2+ 2+   Lower Trapezius 2+/5 2+/5   5/5 B 2+ 2+   Elbow Flexion  4-/5 4-/5   5/5 B 4- 4   Elbow Extension 4-/5 4-/5   5/5 B 4- 4-         MUSCLE LENGTH:      Muscle Tested  Right  8/31/2020 Left   8/31/2020 Goal Right 9/28/2020 Left 9/28/2020   Upper Trapezius  decreased decreased Normal B  decreased decreased   Levator Scapulae  decreased decreased Normal B decreased decreased   Sternocleidomastoid decreased decreased Normal B decreased decreased   Pectoralis Minor  decreased decreased Normal B decreased decreased   Pectoralis Major decreased decreased Normal B decreased decreased         Joint mobility and special tests not performed due to muscle guarding and inability to obtain test positions due to pain        Palpation: Increased tone and tenderness noted with palpation of bilateral cervical paraspinals, upper trapezius, levator scapulae , biceps brachii, deltoid, supraspinatus , infraspinatus , teres major and minor , subscapularis  and thoracic paraspinals. Increased tenderness noted with palpation of bilateral acromion.      Posture:  Pt presents with postural abnormalities which include: forward head, rounded shoulders , increased thoracic kyphosis  and increased lumbar lordosis     Movement Analysis: increased shoulder hiking, upper trapezius activation, and contralateral trunk lean noted with B shoulder  flexion and abduction.         TREATMENT     Yolanda received therapeutic exercises to develop strength, ROM, posture and core stabilization for 40 minutes including:    Exercise 9/28/2020   Rope and pulley  3 minutes each   Flexion, extension, external rotation    Upper trapezius stretch  2 x 1 minute on R    Elbow flexion and extension AROM  2 x 1 on R   Supine    Shoulder flexion table slides  10x on R for HEP demonstration    R shoulder PROM  Flexion, abduction, IR and ER   ~8 minutes                    Yolanda received the following manual therapy techniques: Joint mobilizations and Soft tissue Mobilization were applied to the: R shoulder for 0 minutes, including:  STM of right upper trapezius, biceps brachii, deltoid, supraspinatus , infraspinatus , teres major and minor , subscapularis  and periscapular musculature  Grade II glenohumeral distraction     Home Exercises Provided and Patient Education Provided     Education/Self-Care provided:    Patient educated on biomechanical justification for therapeutic exercise and importance of compliance with HEP in order to improve overall impairments and QOL    Patient educated on the importance of improved core and upper extremity strength in order to improve alignment of the spine and upper extremities with static positions and dynamic movement.     Written Home Exercises Provided: yes.  Exercises were reviewed and Yolanda was able to demonstrate them prior to the end of the session.  Yolanda demonstrated good  understanding of the education provided.     See EMR under Patient Instructions for exercises provided 9/21/2020.    ASSESSMENT   Pt demonstrated decreased cervical flexion, right side bending and right rotation, no significant improvement with cervical left rotation and left side bending. tolerated manual therapy well with reports of decreased pain and tension in musculature following intervention. Right shoulder strength is grossly unchanged and patient demonstrates  only minimal improvement with left shoulder strength. Initially, patient was able to obtain greater shoulder flexion against gravity compared to with gravity assisting. Patient may have not been putting forth best effort with shoulder range of motion assessment. Better understanding of home exercises after session.     Yolanda is progressing well towards her goals.   Pt prognosis is Fair.     Pt will continue to benefit from skilled outpatient physical therapy to address the deficits listed in the problem list box on initial evaluation, provide pt/family education and to maximize pt's level of independence in the home and community environment.     Pt's spiritual, cultural and educational needs considered and pt agreeable to plan of care and goals.     Anticipated Barriers for therapy: co-morbidities, sedentary lifestyle, chronicity of condition, lack of understanding of condition and adherence to treatment plan    GOALS:     Short Term Goals:  6 weeks     1. Pain: Pt will demonstrate improved pain by reports of less than or equal to 7/10 worst pain on the verbal rating scale in order to progress toward maximal functional ability and improve QOL.     2. Function: Patient will demonstrate improved function as indicated by a functional limitation score of less than or equal to 58 out of 100 on FOTO.     3. Mobility: Patient will improve AROM to 50% of stated goals, listed in objective measures above, in order to progress towards independence with functional activities.      4. Strength: Patient will improve strength to 50% of stated goals, listed in objective measures above, in order to progress towards independence with functional activities.      5. HEP: Patient will demonstrate independence with HEP in order to progress toward functional independence.        Long Term Goals:  12 weeks     1. Pain: Pt will demonstrate improved pain by reports of less than or equal to 5/10 worst pain on the verbal rating scale in order  to progress toward maximal functional ability and improve QOL.       2. Function: Patient will demonstrate improved function as indicated by a functional limitation score of less than or equal to 48 out of 100 on FOTO.     3. Mobility: Patient will improve AROM to stated goals, listed in objective measures above, in order to return to maximal functional potential and improve quality of life.     4. Strength: Patient will improve strength to stated goals, listed in objective measures above, in order to improve functional independence and quality of life.     5. Patient will return to normal ADL's, IADL's, community involvement, recreational activities, and work-related activities with less than or equal to 5/10 pain and maximal function.             PLAN   Continue Plan of Care (POC) and progress per patient tolerance.    Evaluation: 8/23/2020  POC Expiration: 11/23/2020    Raz Orozco PTA

## 2020-09-29 RX ORDER — GABAPENTIN 300 MG/1
600 CAPSULE ORAL 3 TIMES DAILY
Qty: 540 CAPSULE | Refills: 1 | Status: SHIPPED | OUTPATIENT
Start: 2020-09-29 | End: 2020-10-02

## 2020-10-01 ENCOUNTER — CLINICAL SUPPORT (OUTPATIENT)
Dept: REHABILITATION | Facility: HOSPITAL | Age: 50
End: 2020-10-01
Payer: MEDICAID

## 2020-10-01 ENCOUNTER — TELEPHONE (OUTPATIENT)
Dept: RHEUMATOLOGY | Facility: CLINIC | Age: 50
End: 2020-10-01

## 2020-10-01 ENCOUNTER — TELEPHONE (OUTPATIENT)
Dept: DIABETES | Facility: CLINIC | Age: 50
End: 2020-10-01

## 2020-10-01 DIAGNOSIS — R29.3 POOR POSTURE: ICD-10-CM

## 2020-10-01 DIAGNOSIS — M62.81 PROXIMAL MUSCLE WEAKNESS: ICD-10-CM

## 2020-10-01 DIAGNOSIS — M25.611 DECREASED RIGHT SHOULDER RANGE OF MOTION: ICD-10-CM

## 2020-10-01 PROCEDURE — 97110 THERAPEUTIC EXERCISES: CPT

## 2020-10-01 RX ORDER — INSULIN ASPART 100 [IU]/ML
INJECTION, SOLUTION INTRAVENOUS; SUBCUTANEOUS
Qty: 15 ML | Refills: 3 | Status: SHIPPED | OUTPATIENT
Start: 2020-10-01 | End: 2020-10-09

## 2020-10-01 RX ORDER — INSULIN GLARGINE 300 U/ML
INJECTION, SOLUTION SUBCUTANEOUS
Qty: 15 ML | Refills: 3 | Status: SHIPPED | OUTPATIENT
Start: 2020-10-01 | End: 2020-10-29 | Stop reason: SDUPTHER

## 2020-10-01 RX ORDER — INSULIN ASPART 100 [IU]/ML
INJECTION, SOLUTION INTRAVENOUS; SUBCUTANEOUS
Qty: 15 ML | Refills: 3 | Status: SHIPPED | OUTPATIENT
Start: 2020-10-01 | End: 2020-10-12 | Stop reason: SDUPTHER

## 2020-10-01 NOTE — TELEPHONE ENCOUNTER
----- Message from Shannon Kentrell sent at 10/1/2020  1:07 PM CDT -----  Regarding: refill  Contact: pt  1. What is the name of the medication you are requesting? Insulin   2. What is the dose? N/a  3. How do you take the medication? Orally, topically, etc? N/a  4. How often do you take this medication? N/a  5. Do you need a 30 day or 90 day supply? N/a  6. How many refills are you requesting? N/a  7. What is your preferred pharmacy and location of the pharmacy? [facility]  8. Who can we contact with further questions? the pt at [phone]

## 2020-10-01 NOTE — TELEPHONE ENCOUNTER
----- Message from Michaela Jimenez sent at 10/1/2020 10:28 AM CDT -----  Regarding: med advice  .Type:  Needs Medical Advice    Who Called:  pt  Symptoms (please be specific):  sever back pain    How long has patient had these symptoms:     Pharmacy name and phone #:         [facility]  [address] [facility] [address]  Phone: [phone] Fax: [phone]       Would the patient rather a call back or a response via [facility]? Call back    Best Call Back Number:   [phone] (home)      Additional Information: pt needs Rx called in or need to schedule an appt

## 2020-10-01 NOTE — PROGRESS NOTES
Physical Therapy Progress Note     Name: Yolanda Betts  Clinic Number: [id number]    Therapy Diagnosis:   Encounter Diagnoses   Name Primary?    Proximal muscle weakness     Poor posture     Decreased right shoulder range of motion      Physician: Maikel Redmond MD    Visit Date: 10/1/2020    Physician Orders: PT Eval and Treat  Medical Diagnosis from Referral: R shoulder pain  Evaluation Date: 8/31/2020  Authorization Period Expiration: 12/31/2020  Plan of Care Expiration: 11/23/2020  Visit # / Visits authorized: 4 (3 of 20)      Precautions: Standard, Diabetes and Bipolar disored, schizoaffective disorder    Time In: 9:30 am  Time Out: 10:20 am  Total Billable Time: 50 minutes    SUBJECTIVE     Pt reports: no significant changes since previous session, states the shoulder continues to feel stiff. States she woke up with increased low back pain this morning. This is a chronic problem.   Patient was compliant with home exercise program   Response to previous treatment: sore following session   Functional change: None noted    Pre-Treatment Pain: 8/10  Post-Treatment Pain: 8/10  Location: R shoulder and scapular      TREATMENT     Yolanda received therapeutic exercises to develop strength, ROM, posture and core stabilization for 35 minutes including:    Exercise 10/1/2020   Rope and pulley  3 minutes each   Flexion, extension, external rotation    Upper trapezius stretch     Manual resisted elbow flexion and extension AROM  ~1 minute on R    Shoulder flexion table slides     R shoulder PROM  Flexion, abduction, IR and ER, scaption, adduction  ~8 minutes    R shoulder isometrics  2 x 10 in each direction  Flexion, extension, internal rotation, external rotation, abduction                Yolanda received the following manual therapy techniques: Joint mobilizations and Soft tissue Mobilization were applied to the: R shoulder for 15 minutes, including:  STM of right upper trapezius, biceps brachii, deltoid,  supraspinatus , infraspinatus , teres major and minor , subscapularis  and periscapular musculature  Grade II glenohumeral distraction and inferior glide     Home Exercises Provided and Patient Education Provided     Education/Self-Care provided:    Patient educated on biomechanical justification for therapeutic exercise and importance of compliance with HEP in order to improve overall impairments and QOL    Patient educated on the importance of improved core and upper extremity strength in order to improve alignment of the spine and upper extremities with static positions and dynamic movement.     Written Home Exercises Provided: yes.  Exercises were reviewed and Yolanda was able to demonstrate them prior to the end of the session.  Yolanda demonstrated good  understanding of the education provided.     See EMR under Patient Instructions for exercises provided 9/21/2020.    ASSESSMENT   Patient tolerates exercises well with reports of tolerable discomfort in R shoulder. Patient reports that she is having a lot of pain in the low back with standing interventions; therefore, we finished shoulder internal and external rotation isometrics in supine position with manual resistance applied by therapist. Patient tolerated manual therapy well today with reports of tolerable discomfort during intervention and states that shoulder feels much looser following intervention. Increased passive range of motion noted today due to decreased muscle guarding.    Yolanda is progressing well towards her goals.   Pt prognosis is Fair.     Pt will continue to benefit from skilled outpatient physical therapy to address the deficits listed in the problem list box on initial evaluation, provide pt/family education and to maximize pt's level of independence in the home and community environment.     Pt's spiritual, cultural and educational needs considered and pt agreeable to plan of care and goals.     Anticipated Barriers for therapy:  co-morbidities, sedentary lifestyle, chronicity of condition, lack of understanding of condition and adherence to treatment plan    GOALS:     Short Term Goals:  6 weeks     1. Pain: Pt will demonstrate improved pain by reports of less than or equal to 7/10 worst pain on the verbal rating scale in order to progress toward maximal functional ability and improve QOL.     2. Function: Patient will demonstrate improved function as indicated by a functional limitation score of less than or equal to 58 out of 100 on FOTO.     3. Mobility: Patient will improve AROM to 50% of stated goals, listed in objective measures above, in order to progress towards independence with functional activities.      4. Strength: Patient will improve strength to 50% of stated goals, listed in objective measures above, in order to progress towards independence with functional activities.      5. HEP: Patient will demonstrate independence with HEP in order to progress toward functional independence.        Long Term Goals:  12 weeks     1. Pain: Pt will demonstrate improved pain by reports of less than or equal to 5/10 worst pain on the verbal rating scale in order to progress toward maximal functional ability and improve QOL.       2. Function: Patient will demonstrate improved function as indicated by a functional limitation score of less than or equal to 48 out of 100 on FOTO.     3. Mobility: Patient will improve AROM to stated goals, listed in objective measures above, in order to return to maximal functional potential and improve quality of life.     4. Strength: Patient will improve strength to stated goals, listed in objective measures above, in order to improve functional independence and quality of life.     5. Patient will return to normal ADL's, IADL's, community involvement, recreational activities, and work-related activities with less than or equal to 5/10 pain and maximal function.             PLAN   Continue Plan of Care (POC)  and progress per patient tolerance.    Evaluation: 8/23/2020  Progress Note: 9/28/2020  POC Expiration: 11/23/2020    Kathy Sanders PT

## 2020-10-01 NOTE — TELEPHONE ENCOUNTER
Spoke with patient. 3 days ago she started with back pain . Starts in shoulder area and travels down to lower back. Scheduled with Dr. Gibbs 10-2.

## 2020-10-01 NOTE — TELEPHONE ENCOUNTER
Hi Dr. Severino,   Pt contacted office for assistance in refilling Novolog. She says pharm needs new Rx.   Thanks for your support, Yara

## 2020-10-02 ENCOUNTER — SSC ENCOUNTER (OUTPATIENT)
Dept: ADMINISTRATIVE | Facility: OTHER | Age: 50
End: 2020-10-02

## 2020-10-02 ENCOUNTER — OFFICE VISIT (OUTPATIENT)
Dept: RHEUMATOLOGY | Facility: CLINIC | Age: 50
End: 2020-10-02
Payer: MEDICAID

## 2020-10-02 VITALS
WEIGHT: 209.19 LBS | HEIGHT: 56 IN | DIASTOLIC BLOOD PRESSURE: 81 MMHG | HEART RATE: 92 BPM | BODY MASS INDEX: 47.06 KG/M2 | SYSTOLIC BLOOD PRESSURE: 122 MMHG

## 2020-10-02 DIAGNOSIS — M79.7 FIBROMYALGIA: ICD-10-CM

## 2020-10-02 DIAGNOSIS — Z71.89 COUNSELING ON HEALTH PROMOTION AND DISEASE PREVENTION: ICD-10-CM

## 2020-10-02 DIAGNOSIS — G89.29 CHRONIC BILATERAL LOW BACK PAIN WITHOUT SCIATICA: Primary | ICD-10-CM

## 2020-10-02 DIAGNOSIS — M54.50 CHRONIC BILATERAL LOW BACK PAIN WITHOUT SCIATICA: Primary | ICD-10-CM

## 2020-10-02 DIAGNOSIS — M15.9 PRIMARY OSTEOARTHRITIS INVOLVING MULTIPLE JOINTS: ICD-10-CM

## 2020-10-02 PROCEDURE — 99214 PR OFFICE/OUTPT VISIT, EST, LEVL IV, 30-39 MIN: ICD-10-PCS | Mod: S$PBB,,, | Performed by: INTERNAL MEDICINE

## 2020-10-02 PROCEDURE — 99214 OFFICE O/P EST MOD 30 MIN: CPT | Mod: PBBFAC | Performed by: INTERNAL MEDICINE

## 2020-10-02 PROCEDURE — 99999 PR PBB SHADOW E&M-EST. PATIENT-LVL IV: ICD-10-PCS | Mod: PBBFAC,,, | Performed by: INTERNAL MEDICINE

## 2020-10-02 PROCEDURE — 99214 OFFICE O/P EST MOD 30 MIN: CPT | Mod: S$PBB,,, | Performed by: INTERNAL MEDICINE

## 2020-10-02 PROCEDURE — 99999 PR PBB SHADOW E&M-EST. PATIENT-LVL IV: CPT | Mod: PBBFAC,,, | Performed by: INTERNAL MEDICINE

## 2020-10-02 RX ORDER — PREGABALIN 150 MG/1
150 CAPSULE ORAL 2 TIMES DAILY
Qty: 60 CAPSULE | Refills: 3 | Status: SHIPPED | OUTPATIENT
Start: 2020-10-02 | End: 2020-11-13 | Stop reason: SDUPTHER

## 2020-10-02 NOTE — PROGRESS NOTES
RHEUMATOLOGY OUTPATIENT CLINIC NOTE    10/2/2020    Attending Rheumatologist: Abilio Gibbs  Primary Care Provider: CHARLA Beach MD   Physician Requesting Consultation: No referring provider defined for this encounter.  Chief Complaint/Reason For Consultation:  Chronic pain.    Subjective:       HPI  Yolanda Betts is a 50 y.o. Black or  female with chronic pain comes for follow-up.    Today  Last seen on July.  Recommendations given for chronic pain from DJD.  Patient comes sooner than previously recommended to worsening lower back pain.  Worse in the evening, aggravated by prolonged weight-bearing.  No significant relieved with over-the-counter pain medication or gabapentin.  Denies association with fever, focal medical symptoms,  or GI complaints.    Review of Systems   Constitutional: Negative for chills, fever and malaise/fatigue.   Eyes: Negative for pain and redness.   Respiratory: Negative for cough, hemoptysis and shortness of breath.    Cardiovascular: Negative for chest pain and leg swelling.   Gastrointestinal: Negative for abdominal pain, blood in stool and melena.   Genitourinary: Negative for dysuria and hematuria.   Musculoskeletal: Positive for back pain (Chronic, intermittent.  Mechanical features.  No alarm signs or symptoms.) and joint pain (Generalized, worst shoulders.  Mechanical pattern.). Negative for falls.   Skin: Negative for rash.   Neurological: Positive for tingling (Median nerve distribution, chronic intermittent.). Negative for focal weakness.   Psychiatric/Behavioral: Negative for memory loss. The patient does not have insomnia.      Chronic comorbid conditions affecting medical decision making today:  Past Medical History:   Diagnosis Date    Abnormal Pap smear of cervix     HPV genital warts    Anemia     Anxiety     Arthritis     Asthma 10/11/2016    Bipolar 1 disorder     Diabetes mellitus, type 2     Dyslipidemia associated with type 2  diabetes mellitus 2019    Genital warts     GERD (gastroesophageal reflux disease)     Herpes simplex virus (HSV) infection     Hyperlipidemia     Hypertension     Hypertension complicating diabetes 2019    Migraine with aura and without status migrainosus, not intractable 3/21/2016    Mild persistent asthma without complication 10/11/2016    Morbid obesity with body mass index (BMI) of 45.0 to 49.9 in adult 8/3/2017    Obstructive sleep apnea     ALEN (obstructive sleep apnea)     2L per N/C q HS    Schizoaffective disorder, bipolar type 2019    Seasonal allergic rhinitis due to pollen 2019    Type 2 diabetes mellitus with hyperglycemia, with long-term current use of insulin 2017    Type 2 diabetes mellitus with hyperglycemia, without long-term current use of insulin 2017     Past Surgical History:   Procedure Laterality Date    abcess removed right back      BELT ABDOMINOPLASTY      BREAST SURGERY Bilateral 1996    Reduction     SECTION      X 2    COLONOSCOPY      COLONOSCOPY N/A 2018    Procedure: colonoscopy for iron deficiency anemia;  Surgeon: Frankie Sanchez MD;  Location: [facility] ENDO;  Service: Endoscopy;  Laterality: N/A;    COLONOSCOPY N/A 2018    Procedure: COLONOSCOPY;  Surgeon: Frankie Sanchez MD;  Location: [facility] ENDO;  Service: Endoscopy;  Laterality: N/A;    HYSTERECTOMY      w/ BSO; hypermenorrhea    MOUTH SURGERY      OOPHORECTOMY      hyst/bso, hypermenorrhea    ROBOT-ASSISTED CHOLECYSTECTOMY USING DA DARI XI N/A 1/3/2020    Procedure: XI ROBOTIC CHOLECYSTECTOMY;  Surgeon: Damir Driscoll MD;  Location: [facility] OR;  Service: General;  Laterality: N/A;    TOTAL REDUCTION MAMMOPLASTY      TUBAL LIGATION       Family History   Problem Relation Age of Onset    Diabetes Mother     Hyperlipidemia Mother     Hypertension Mother     Asthma Mother     COPD Mother     Glaucoma Mother     Thyroid disease Mother  "    Anesthesia problems Mother         "almost had a cardiac arrest" , blood clots    Hypertension Father     Hyperlipidemia Father     Cancer Father         Brain, lung, liver, kidney    Heart disease Maternal Grandmother     Hyperlipidemia Maternal Grandmother     Hypertension Maternal Grandmother     Cataracts Maternal Grandmother     Diabetes Maternal Grandmother     Heart disease Maternal Grandfather     Hyperlipidemia Maternal Grandfather     Hypertension Maternal Grandfather     Glaucoma Maternal Grandfather     Cancer Maternal Grandfather     Cataracts Maternal Grandfather     Macular degeneration Maternal Grandfather     Diabetes Maternal Grandfather     Heart disease Paternal Grandmother     Hyperlipidemia Paternal Grandmother     Hypertension Paternal Grandmother     Cataracts Paternal Grandmother     Heart disease Paternal Grandfather     Hyperlipidemia Paternal Grandfather     Hypertension Paternal Grandfather     Cataracts Paternal Grandfather     Breast cancer Maternal Cousin     Breast cancer Maternal Cousin     Breast cancer Maternal Cousin     Breast cancer Maternal Cousin      Social History     Substance and Sexual Activity   Alcohol Use Not Currently    Alcohol/week: 0.0 standard drinks    Comment: socially  No alcohol 72h prior to sx     Social History     Tobacco Use   Smoking Status Never Smoker   Smokeless Tobacco Never Used     Social History     Substance and Sexual Activity   Drug Use No       Current Outpatient Medications:     amLODIPine (NORVASC) 10 MG tablet, Take 1 tablet (10 mg total) by mouth once daily., Disp: 30 tablet, Rfl: 3    amLODIPine (NORVASC) 10 MG tablet, Take 1 tablet (10 mg total) by mouth every evening., Disp: 90 tablet, Rfl: 3    amLODIPine (NORVASC) 5 MG tablet, Take 1 tablet (5 mg total) by mouth every evening., Disp: 30 tablet, Rfl: 11    ammonium lactate 12 % Crea, Apply 1 Application topically 2 (two) times daily., Disp: 140 g, " "Rfl: 3    ARIPiprazole (ABILIFY) 10 MG Tab, Take 1 tablet (10 mg total) by mouth once daily., Disp: 30 tablet, Rfl: 2    aspirin (ECOTRIN) 81 MG EC tablet, Take 81 mg by mouth once daily., Disp: , Rfl:     atorvastatin (LIPITOR) 20 MG tablet, Take 1 tablet (20 mg total) by mouth every evening., Disp: 30 tablet, Rfl: 1    BD INSULIN SYRINGE ULTRA-FINE 1/2 mL 30 gauge x 1/2" Syrg, , Disp: , Rfl: 0    benztropine (COGENTIN) 1 MG tablet, Take 1 tablet (1 mg total) by mouth every evening., Disp: 30 tablet, Rfl: 1    blood sugar diagnostic Strp, Check blood glucose 4 times daily as directed and as needed (dispense insurance preferred brand or patient choice), Disp: 200 each, Rfl: 5    blood-glucose meter Misc, Use to check blood sugars, give meter based on insurance specification, Disp: 1 each, Rfl: 1    clonazePAM (KLONOPIN) 1 MG tablet, Take 1 tablet by mouth three times daily, Disp: 90 tablet, Rfl: 0    clotrimazole-betamethasone 1-0.05% (LOTRISONE) cream, Apply to affected area 2 times daily, Disp: 15 g, Rfl: 1    cyclobenzaprine (FLEXERIL) 10 MG tablet, Take 1 tablet (10 mg total) by mouth daily as needed (for back muscle spasms)., Disp: 30 tablet, Rfl: 2    cyclosporine 0.05 % Drop, Place 1 drop into both eyes 2 (two) times daily., Disp: 5.5 mL, Rfl: 11    DULoxetine (CYMBALTA) 60 MG capsule, Take 1 capsule by mouth twice daily, Disp: 60 capsule, Rfl: 1    ergocalciferol (ERGOCALCIFEROL) 50,000 unit Cap, Take 1 capsule (50,000 Units total) by mouth every 14 (fourteen) days., Disp: 4 capsule, Rfl: 5    exenatide microspheres (BYDUREON) 2 mg/0.65 mL PnIj, Inject 2 mg into the skin every 7 days., Disp: 4 each, Rfl: 3    fish oil-omega-3 fatty acids 300-1,000 mg capsule, Take 1 capsule by mouth once daily., Disp: , Rfl:     furosemide (LASIX) 20 MG tablet, Take 1 tablet (20 mg total) by mouth once daily., Disp: 30 tablet, Rfl: 11    glimepiride (AMARYL) 2 MG tablet, Take 2 tablets (4 mg total) by mouth " before breakfast., Disp: 60 tablet, Rfl: 3    hydrALAZINE (APRESOLINE) 10 MG tablet, Take 1 tablet (10 mg total) by mouth every 12 (twelve) hours., Disp: 60 tablet, Rfl: 11    hydrOXYzine pamoate (VISTARIL) 25 MG Cap, Take 1 capsule by mouth every day, Disp: 30 capsule, Rfl: 0    insulin aspart U-100 (NOVOLOG FLEXPEN U-100 INSULIN) 100 unit/mL (3 mL) InPn pen, Inject before meals three times a day up to 30 units daily, Disp: 15 mL, Rfl: 3    insulin aspart U-100 (NOVOLOG FLEXPEN U-100 INSULIN) 100 unit/mL (3 mL) InPn pen, Inject before meals three times a day up to 30 units daily, Disp: 15 mL, Rfl: 3    lancets 28 gauge Misc, use as directed 3 times daily, Disp: 100 each, Rfl: 11    linaCLOtide (LINZESS) 145 mcg Cap capsule, Take 1 capsule (145 mcg total) by mouth daily as needed (for constipation)., Disp: 30 capsule, Rfl: 11    lurasidone (LATUDA) 120 mg Tab, Take 1 tablet by mouth everyday at 5 pm with food, Disp: 30 tablet, Rfl: 0    magnesium oxide (MAG-OX) 400 mg (241.3 mg magnesium) tablet, Take 1 tablet (400 mg total) by mouth once daily., Disp: 90 tablet, Rfl: 3    metFORMIN (GLUCOPHAGE) 1000 MG tablet, Take 1 tablet (1,000 mg total) by mouth 2 (two) times daily with meals., Disp: 180 tablet, Rfl: 4    metoprolol succinate (TOPROL-XL) 100 MG 24 hr tablet, Take 2 tablets (200 mg total) by mouth every evening., Disp: 180 tablet, Rfl: 3    metoprolol succinate (TOPROL-XL) 200 MG 24 hr tablet, Take 1 tablet by mouth once daily in the evening., Disp: 90 tablet, Rfl: 3    mirtazapine (REMERON) 45 MG tablet, Take 1 tablet by mouth every evening, Disp: 30 tablet, Rfl: 1    mometasone (ASMANEX TWISTHALER) 220 mcg/ actuation (120) AePB, Inhale 2 puffs into the lungs 2 (two) times daily. Controller, Disp: 1 each, Rfl: 11    montelukast (SINGULAIR) 10 mg tablet, Take 1 tablet (10 mg total) by mouth every evening., Disp: 30 tablet, Rfl: 11    MULTIVIT,CALC,MINS/IRON/FOLIC (DAILY MULTIPLE FOR WOMEN ORAL),  "Take 1 tablet by mouth once daily., Disp: , Rfl:     pen needle, diabetic (BD ULTRA-FINE MINI PEN NEEDLE) 31 gauge x 3/16" Ndle, Use 5 a day, Disp: 200 each, Rfl: 3    promethazine (PHENERGAN) 25 MG tablet, , Disp: , Rfl:     spironolactone (ALDACTONE) 25 MG tablet, Take 1 tablet (25 mg total) by mouth once daily., Disp: 30 tablet, Rfl: 6    temazepam (RESTORIL) 15 mg Cap, Take 1 capsule by mouth at bedtime, Disp: 30 capsule, Rfl: 1    topiramate (TOPAMAX) 50 MG tablet, Take 1 tablet (50 mg total) by mouth 2 (two) times daily., Disp: 60 tablet, Rfl: 11    TOUJEO MAX U-300 SOLOSTAR 300 unit/mL (3 mL) InPn, Inject 96 units once daily at bedtime, Disp: 15 mL, Rfl: 3    valACYclovir (VALTREX) 1000 MG tablet, Take 1 tablet (1,000 mg total) by mouth once daily., Disp: 30 tablet, Rfl: 11    valsartan (DIOVAN) 320 MG tablet, Take 1 tablet (320 mg total) by mouth once daily., Disp: 90 tablet, Rfl: 3    zolpidem (AMBIEN) 10 mg Tab, Take 1 tablet by mouth at bedtime, Disp: 30 tablet, Rfl: 1    albuterol (PROVENTIL) 2.5 mg /3 mL (0.083 %) nebulizer solution, Take 3 mLs (2.5 mg total) by nebulization every 4 (four) hours., Disp: 180 mL, Rfl: 11    ALPRAZolam (XANAX) 1 MG tablet, Take 1 tablet by mouth twice daily (Patient not taking: Reported on 9/24/2020), Disp: 60 tablet, Rfl: 0    pregabalin (LYRICA) 150 MG capsule, Take 1 capsule (150 mg total) by mouth 2 (two) times daily., Disp: 60 capsule, Rfl: 3     Objective:         Vitals:    10/02/20 1638   BP: 122/81   Pulse: 92     Physical Exam   Constitutional: No distress.   Estimated body mass index is 46.91 kg/m² as calculated from the following:    Height as of this encounter: 4' 8" (1.422 m).    Weight as of this encounter: 94.9 kg (209 lb 3.5 oz).    Wt Readings from Last 1 Encounters:  10/02/20 1638 : 94.9 kg (209 lb 3.5 oz)     HENT:   Head: Normocephalic and atraumatic.   Eyes: Conjunctivae are normal. Pupils are equal, round, and reactive to light.   Neck: " Normal range of motion.   Cardiovascular: Normal rate and intact distal pulses.    Pulmonary/Chest: Effort normal. No respiratory distress.   Abdominal: Soft. She exhibits no distension.   Neurological: She is alert. Gait normal.   Skin: No rash noted. No erythema.     Musculoskeletal: Normal range of motion.      Comments: : strong  No synovitis or significant squeeze tenderness    AROM: intact  PROM: intact    Devices used by patient: none       Reviewed old and all outside pertinent medical records available.    All lab results personally reviewed and interpreted by me.  Lab Results   Component Value Date    WBC 9.02 07/28/2020    HGB 12.8 07/28/2020    HCT 42.7 07/28/2020    MCV 91 07/28/2020    MCH 27.3 07/28/2020    MCHC 30.0 (L) 07/28/2020    RDW 15.0 (H) 07/28/2020     07/28/2020    MPV 9.8 07/28/2020       Lab Results   Component Value Date     07/28/2020    K 4.6 07/28/2020     07/28/2020    CO2 24 07/28/2020     (H) 07/28/2020    BUN 8 07/28/2020    CALCIUM 9.8 07/28/2020    PROT 7.7 07/28/2020    ALBUMIN 3.7 07/28/2020    BILITOT 0.3 07/28/2020    AST 83 (H) 07/28/2020    ALKPHOS 113 07/28/2020    ALT 56 (H) 07/28/2020       Lab Results   Component Value Date    COLORU Yellow 07/20/2020    APPEARANCEUA Clear 07/20/2020    SPECGRAV 1.025 07/20/2020    PHUR 6.0 07/20/2020    PROTEINUA Negative 07/20/2020    KETONESU Negative 07/20/2020    LEUKOCYTESUR Negative 07/20/2020    NITRITE Negative 07/20/2020    UROBILINOGEN Negative 06/26/2020       Lab Results   Component Value Date    CRP 31.5 (H) 10/14/2019       Lab Results   Component Value Date    SEDRATE 67 (H) 10/14/2019       Lab Results   Component Value Date    RF <10.0 10/14/2019    SEDRATE 67 (H) 10/14/2019       No components found for: 25OHVITDTOT, 03QHIZSM8, 18EUSBXB8, METHODNOTE    Lab Results   Component Value Date    URICACID 6.0 (H) 08/12/2019       No components found for: TSPOTTB    Rheum Labs:  ISAAC  negative  Rheumatoid factor/CCP negative     Infectious Labs:  n/a     Imaging:  All imaging reviewed and independently  interpreted by me.    X-ray lumbar spine August 2020  No fracture.  No listhesis.  Mild multilevel marginal spurring.  Mild disc space narrowing at L5-S1.  Facet arthropathy at L5-S1    X-ray hands September 2020  Degenerative changes noted in the wrist most prominently involving the triscaphe joints.  Small area of decreased density within the right triquetrum suggesting cyst with sclerotic margins.     Mild degenerative changes noted bilaterally in the DIP joints.  No fracture or dislocation.  No discrete erosion or periosteal reaction.  No abnormal calcification or foreign body on the right.  Tiny equivocal calcific density projects over the dorsum of the left hand level of the distal 3rd of the 4th metacarpal.     ASSESSMENT / PLAN:     Yolanda Betts is a 50 y.o. Black or  female with:    1. Chronic pain syndrome  - chronic mechanical back pain refractory to medical management  - available imaging without features suggestive of chronic inflammatory arthritis  - recommend to switch gabapentin to Lyrica, recommended 150 mg b.i.d.  - may continue with duloxetine 60 mg daily.  - remains without synovitis or features of systemic rheumatic disorder  - will follow-up 1 more time and if no features of systemic rheumatic disorder may continue management per PMD  - pain management referral.  - physical therapy    2. Other specified counseling  - Immunization counseling done.  - Weight loss counseling done.  - Nutrition and exercise counseling.  - Regular exercise:  Aerobic and resistance.  - Medication counseling provided.    No follow-ups on file.  RTC 4 months    Method of contact with patient concerns: [facility]    Disclaimer:  This note is prepared using voice recognition software and as such is likely to have errors and has not been proof read. Please contact  me for questions.     Time spent: 25 minutes in face to face discussion concerning diagnosis, prognosis, review of lab and test results, benefits of treatment as well as management of disease, counseling of patient and coordination of care between various health care providers.  Greater than half the time spent was used for coordination of care and counseling of patient.    Abilio Gibbs M.D.  Rheumatology Department   [facility]

## 2020-10-02 NOTE — PROGRESS NOTES
Please note the following patient's information was forwarded to the Medicaid ([place] Medicaid, [employer], [employer], [facility], [facility], [facility]) Case Management office.    Please contact Outpatient Care Management at [phone] (Ext. [phone]) with any questions.    Thank you,    Chrissy Valencia, [facility]  Outpatient Case Mgmnt  [phone]

## 2020-10-05 ENCOUNTER — OFFICE VISIT (OUTPATIENT)
Dept: INTERNAL MEDICINE | Facility: CLINIC | Age: 50
End: 2020-10-05
Payer: MEDICAID

## 2020-10-05 ENCOUNTER — CLINICAL SUPPORT (OUTPATIENT)
Dept: REHABILITATION | Facility: HOSPITAL | Age: 50
End: 2020-10-05
Payer: MEDICAID

## 2020-10-05 VITALS
TEMPERATURE: 97 F | WEIGHT: 208.75 LBS | OXYGEN SATURATION: 95 % | DIASTOLIC BLOOD PRESSURE: 60 MMHG | SYSTOLIC BLOOD PRESSURE: 100 MMHG | HEIGHT: 56 IN | HEART RATE: 95 BPM | BODY MASS INDEX: 46.96 KG/M2

## 2020-10-05 DIAGNOSIS — R29.3 POOR POSTURE: ICD-10-CM

## 2020-10-05 DIAGNOSIS — Z79.4 TYPE 2 DIABETES MELLITUS WITH HYPERGLYCEMIA, WITH LONG-TERM CURRENT USE OF INSULIN: Chronic | ICD-10-CM

## 2020-10-05 DIAGNOSIS — E66.01 MORBID OBESITY WITH BMI OF 45.0-49.9, ADULT: Chronic | ICD-10-CM

## 2020-10-05 DIAGNOSIS — E78.5 DYSLIPIDEMIA ASSOCIATED WITH TYPE 2 DIABETES MELLITUS: Chronic | ICD-10-CM

## 2020-10-05 DIAGNOSIS — E11.59 HYPERTENSION COMPLICATING DIABETES: Chronic | ICD-10-CM

## 2020-10-05 DIAGNOSIS — M25.611 DECREASED RIGHT SHOULDER RANGE OF MOTION: ICD-10-CM

## 2020-10-05 DIAGNOSIS — M62.81 PROXIMAL MUSCLE WEAKNESS: ICD-10-CM

## 2020-10-05 DIAGNOSIS — E11.65 TYPE 2 DIABETES MELLITUS WITH HYPERGLYCEMIA, WITH LONG-TERM CURRENT USE OF INSULIN: Chronic | ICD-10-CM

## 2020-10-05 DIAGNOSIS — K76.0 NAFLD (NONALCOHOLIC FATTY LIVER DISEASE): Chronic | ICD-10-CM

## 2020-10-05 DIAGNOSIS — E11.69 DYSLIPIDEMIA ASSOCIATED WITH TYPE 2 DIABETES MELLITUS: Chronic | ICD-10-CM

## 2020-10-05 DIAGNOSIS — I15.2 HYPERTENSION COMPLICATING DIABETES: Chronic | ICD-10-CM

## 2020-10-05 DIAGNOSIS — G43.109 MIGRAINE WITH AURA AND WITHOUT STATUS MIGRAINOSUS, NOT INTRACTABLE: Primary | Chronic | ICD-10-CM

## 2020-10-05 PROCEDURE — 97140 MANUAL THERAPY 1/> REGIONS: CPT | Mod: CQ

## 2020-10-05 PROCEDURE — 99214 PR OFFICE/OUTPT VISIT, EST, LEVL IV, 30-39 MIN: ICD-10-PCS | Mod: S$PBB,,, | Performed by: FAMILY MEDICINE

## 2020-10-05 PROCEDURE — 99215 OFFICE O/P EST HI 40 MIN: CPT | Mod: PBBFAC | Performed by: FAMILY MEDICINE

## 2020-10-05 PROCEDURE — 97110 THERAPEUTIC EXERCISES: CPT | Mod: CQ

## 2020-10-05 PROCEDURE — 99999 PR PBB SHADOW E&M-EST. PATIENT-LVL V: CPT | Mod: PBBFAC,,, | Performed by: FAMILY MEDICINE

## 2020-10-05 PROCEDURE — 99999 PR PBB SHADOW E&M-EST. PATIENT-LVL V: ICD-10-PCS | Mod: PBBFAC,,, | Performed by: FAMILY MEDICINE

## 2020-10-05 PROCEDURE — 99214 OFFICE O/P EST MOD 30 MIN: CPT | Mod: S$PBB,,, | Performed by: FAMILY MEDICINE

## 2020-10-05 RX ORDER — RIZATRIPTAN BENZOATE 10 MG/1
TABLET, ORALLY DISINTEGRATING ORAL
Qty: 9 TABLET | Refills: 5 | Status: CANCELLED | OUTPATIENT
Start: 2020-10-05

## 2020-10-05 RX ORDER — FROVATRIPTAN SUCCINATE 2.5 MG/1
TABLET, FILM COATED ORAL
Qty: 9 TABLET | Refills: 5 | Status: SHIPPED | OUTPATIENT
Start: 2020-10-05 | End: 2021-06-29 | Stop reason: SDUPTHER

## 2020-10-05 NOTE — ASSESSMENT & PLAN NOTE
Lab Results   Component Value Date    AST 83 (H) 07/28/2020    AST 59 (H) 06/26/2020    AST 25 04/19/2020    ALT 56 (H) 07/28/2020    ALT 56 (H) 06/26/2020    ALT 29 04/19/2020   Asymptomatic. Clinically stable.

## 2020-10-05 NOTE — ASSESSMENT & PLAN NOTE
Diabetes Management Status    Statin: Taking  ACE/ARB: Taking    Screening or Prevention Patient's value Goal Complete/Controlled?   HgA1C Testing and Control   Lab Results   Component Value Date    HGBA1C 11.6 (H) 07/07/2020      Annually/Less than 8% No   Lipid profile : 07/07/2020 Annually Yes   LDL control Lab Results   Component Value Date    LDLCALC Invalid, Trig>400.0 07/07/2020    Annually/Less than 100 mg/dl  Yes   Nephropathy screening Lab Results   Component Value Date    LABMICR 48.0 07/07/2020     Lab Results   Component Value Date    PROTEINUA Negative 07/20/2020    Annually Yes   Blood pressure BP Readings from Last 1 Encounters:   10/05/20 100/60    Less than 140/90 Yes   Dilated retinal exam : 05/22/2019 Annually Yes   Foot exam   : 07/14/2020 Annually Yes     Lab Results   Component Value Date    HGBA1C 11.6 (H) 07/07/2020    HGBA1C 7.1 (H) 02/18/2020    HGBA1C 9.9 (H) 11/18/2019    ESTGFRAFRICA >60 07/28/2020    EGFRNONAA >60 07/28/2020    MICALBCREAT 65.8 (H) 07/07/2020    LDLCALC Invalid, Trig>400.0 07/07/2020     Last 6 Patient Entered Readings                                          Most Recent A1c: 11.6% on 7/7/2020  (Goal: 7%)     Recent Readings 10/5/2020 10/4/2020 10/4/2020 10/4/2020 10/3/2020    Blood Glucose (mg/dL) 109 232 190 122 252         HEALTH MAINTENANCE: Diabetic health maintenance interventions reviewed and are up to date except for:  There are no preventive care reminders to display for this patient.  Improved, but still uncontrolled.    Future Appointments   Date Time Provider Department Center   10/14/2020 10:40 AM Toni Holland OD [facility]   10/14/2020 11:30 AM Yara Sanchez RD, CDE [facility]   10/29/2020  7:30 AM [facility]

## 2020-10-05 NOTE — PROGRESS NOTES
CHIEF COMPLAINT  Headache      DIAGNOSES, HISTORY, ASSESSMENT, AND PLAN  Assessment   1. Migraine with aura and without status migrainosus, not intractable    2. Type 2 diabetes mellitus with hyperglycemia, with long-term current use of insulin    3. Morbid obesity with BMI of 45.0-49.9, adult    4. Dyslipidemia associated with type 2 diabetes mellitus    5. Hypertension complicating diabetes    6. NAFLD (nonalcoholic fatty liver disease)         Orders Placed This Encounter    Hemoglobin A1C    Hepatic Function Panel    frovatriptan (FROVA) 2.5 MG tablet     Except as noted herein, any chronic conditions are compensated/controlled and stable, and no other significant new complaints or concerns reported.     Plan   Problem List Items Addressed This Visit     Migraine with aura and without status migrainosus, not intractable - Primary (Chronic)    Overview     Onset age 18. Had previous exhaustive workup at [facility], including MRI head.         Current Assessment & Plan     She says topirimate did not suppress migraines. She says that sumatriptan and rizatriptan did not work. She says that the only medicine that she found that worked was frovatriptan.         Relevant Medications    frovatriptan (FROVA) 2.5 MG tablet    Type 2 diabetes mellitus with hyperglycemia, with long-term current use of insulin (Chronic)    Current Assessment & Plan     Diabetes Management Status    Statin: Taking  ACE/ARB: Taking    Screening or Prevention Patient's value Goal Complete/Controlled?   HgA1C Testing and Control   Lab Results   Component Value Date    HGBA1C 11.6 (H) 07/07/2020      Annually/Less than 8% No   Lipid profile : 07/07/2020 Annually Yes   LDL control Lab Results   Component Value Date    LDLCALC Invalid, Trig>400.0 07/07/2020    Annually/Less than 100 mg/dl  Yes   Nephropathy screening Lab Results   Component Value Date    LABMICR 48.0 07/07/2020     Lab Results   Component Value Date    PROTEINUA  Negative 07/20/2020    Annually Yes   Blood pressure BP Readings from Last 1 Encounters:   10/05/20 100/60    Less than 140/90 Yes   Dilated retinal exam : 05/22/2019 Annually Yes   Foot exam   : 07/14/2020 Annually Yes     Lab Results   Component Value Date    HGBA1C 11.6 (H) 07/07/2020    HGBA1C 7.1 (H) 02/18/2020    HGBA1C 9.9 (H) 11/18/2019    ESTGFRAFRICA >60 07/28/2020    EGFRNONAA >60 07/28/2020    MICALBCREAT 65.8 (H) 07/07/2020    LDLCALC Invalid, Trig>400.0 07/07/2020     Last 6 Patient Entered Readings                                          Most Recent A1c: 11.6% on 7/7/2020  (Goal: 7%)     Recent Readings 10/5/2020 10/4/2020 10/4/2020 10/4/2020 10/3/2020    Blood Glucose (mg/dL) 109 232 190 122 252         HEALTH MAINTENANCE: Diabetic health maintenance interventions reviewed and are up to date except for:  There are no preventive care reminders to display for this patient.  Improved, but still uncontrolled.    Future Appointments   Date Time Provider Department Center   10/14/2020 10:40 AM Toni Holland OD [facility]   10/14/2020 11:30 AM Yara Sanchez RD, CDE [facility] [place]   10/29/2020  7:30 AM Ayush Palomares PA-C [facility]            Relevant Orders    Hemoglobin A1C    Hypertension complicating diabetes (Chronic)    Current Assessment & Plan     BP Readings from Last 6 Encounters:   10/05/20 100/60   10/02/20 122/81   09/24/20 118/68   09/18/20 108/70   09/16/20 (!) 140/70   08/26/20 131/85     Last 5 Patient Entered Readings                                      Current 30 Day Average: 128/83     Recent Readings 9/28/2020 9/23/2020 9/12/2020 9/11/2020 9/10/2020    SBP (mmHg) 115 123 136 140 125    DBP (mmHg) 80 82 80 88 84    Pulse 98 93 101 99 95      Controlled. BP low but asymptomatic today.         Dyslipidemia associated with type 2 diabetes mellitus (Chronic)    Current Assessment & Plan     Lab Results   Component Value Date    CHOL 115  (L) 07/07/2020    CHOL 126 02/18/2020    TRIG 547 (H) 07/07/2020    TRIG 152 (H) 02/18/2020    HDL 31 (L) 07/07/2020    HDL 46 02/18/2020    LDLCALC Invalid, Trig>400.0 07/07/2020    LDLCALC 49.6 (L) 02/18/2020    NONHDLCHOL 84 07/07/2020    NONHDLCHOL 80 02/18/2020    AST 83 (H) 07/28/2020    ALT 56 (H) 07/28/2020   She appears to be tolerating statin for dyslipidemia without apparent symptoms of myositis.         Morbid obesity with BMI of 45.0-49.9, adult (Chronic)    Current Assessment & Plan     Wt Readings from Last 20 Encounters:   10/05/20 94.7 kg (208 lb 12.4 oz)   10/02/20 94.9 kg (209 lb 3.5 oz)   09/24/20 93.4 kg (205 lb 14.6 oz)   09/18/20 93 kg (205 lb 0.4 oz)   09/16/20 92.9 kg (204 lb 12.9 oz)   09/15/20 92.9 kg (204 lb 12.9 oz)   08/26/20 93.7 kg (206 lb 9.1 oz)   08/19/20 93.5 kg (206 lb 2.1 oz)   08/18/20 93.5 kg (206 lb 2.1 oz)   08/18/20 93.5 kg (206 lb 2.1 oz)   07/28/20 92 kg (202 lb 11.4 oz)   07/20/20 94.3 kg (207 lb 14.3 oz)   07/14/20 94.1 kg (207 lb 7.3 oz)   07/10/20 94.2 kg (207 lb 10.8 oz)   07/10/20 94.2 kg (207 lb 10.8 oz)   07/09/20 94.3 kg (207 lb 14.3 oz)   07/07/20 92.9 kg (204 lb 12.9 oz)   06/26/20 94.2 kg (207 lb 10.8 oz)   06/26/20 94.3 kg (207 lb 14.3 oz)   06/22/20 93.5 kg (206 lb 2.1 oz)   Weight trending up slowly. Therapeutic lifestyle changes encouraged.          NAFLD (nonalcoholic fatty liver disease) (Chronic)    Overview     CT Abdomen 01/06/2020  Liver: Hepatomegaly with fatty infiltration.         Current Assessment & Plan     Lab Results   Component Value Date    AST 83 (H) 07/28/2020    AST 59 (H) 06/26/2020    AST 25 04/19/2020    ALT 56 (H) 07/28/2020    ALT 56 (H) 06/26/2020    ALT 29 04/19/2020   Asymptomatic. Clinically stable.          Relevant Orders    Hepatic Function Panel          Outpatient Medications Prior to Visit   Medication Sig Dispense Refill    albuterol (PROVENTIL) 2.5 mg /3 mL (0.083 %) nebulizer solution Take 3 mLs (2.5 mg total) by  "nebulization every 4 (four) hours. 180 mL 11    amLODIPine (NORVASC) 10 MG tablet Take 1 tablet (10 mg total) by mouth every evening. 90 tablet 3    amLODIPine (NORVASC) 5 MG tablet Take 1 tablet (5 mg total) by mouth every evening. 30 tablet 11    ammonium lactate 12 % Crea Apply 1 Application topically 2 (two) times daily. 140 g 3    aspirin (ECOTRIN) 81 MG EC tablet Take 81 mg by mouth once daily.      BD INSULIN SYRINGE ULTRA-FINE 1/2 mL 30 gauge x 1/2" Syrg   0    benztropine (COGENTIN) 1 MG tablet Take 1 tablet (1 mg total) by mouth every evening. 30 tablet 1    blood sugar diagnostic Strp Check blood glucose 4 times daily as directed and as needed (dispense insurance preferred brand or patient choice) 200 each 5    blood-glucose meter Misc Use to check blood sugars, give meter based on insurance specification 1 each 1    clonazePAM (KLONOPIN) 1 MG tablet Take 1 tablet by mouth three times daily 90 tablet 0    DULoxetine (CYMBALTA) 60 MG capsule Take 1 capsule by mouth twice daily 60 capsule 1    ergocalciferol (ERGOCALCIFEROL) 50,000 unit Cap Take 1 capsule (50,000 Units total) by mouth every 14 (fourteen) days. 4 capsule 5    exenatide microspheres (BYDUREON) 2 mg/0.65 mL PnIj Inject 2 mg into the skin every 7 days. 4 each 3    fish oil-omega-3 fatty acids 300-1,000 mg capsule Take 1 capsule by mouth once daily.      furosemide (LASIX) 20 MG tablet Take 1 tablet (20 mg total) by mouth once daily. 30 tablet 11    glimepiride (AMARYL) 2 MG tablet Take 2 tablets (4 mg total) by mouth before breakfast. 60 tablet 3    hydrALAZINE (APRESOLINE) 10 MG tablet Take 1 tablet (10 mg total) by mouth every 12 (twelve) hours. 60 tablet 11    lancets 28 gauge Misc use as directed 3 times daily 100 each 11    linaCLOtide (LINZESS) 145 mcg Cap capsule Take 1 capsule (145 mcg total) by mouth daily as needed (for constipation). 30 capsule 11    lurasidone (LATUDA) 120 mg Tab Take 1 tablet by mouth everyday at " "5 pm with food 30 tablet 0    magnesium oxide (MAG-OX) 400 mg (241.3 mg magnesium) tablet Take 1 tablet (400 mg total) by mouth once daily. 90 tablet 3    metFORMIN (GLUCOPHAGE) 1000 MG tablet Take 1 tablet (1,000 mg total) by mouth 2 (two) times daily with meals. 180 tablet 4    metoprolol succinate (TOPROL-XL) 100 MG 24 hr tablet Take 2 tablets (200 mg total) by mouth every evening. 180 tablet 3    metoprolol succinate (TOPROL-XL) 200 MG 24 hr tablet Take 1 tablet by mouth once daily in the evening. 90 tablet 3    mirtazapine (REMERON) 45 MG tablet Take 1 tablet by mouth every evening 30 tablet 1    mometasone (ASMANEX TWISTHALER) 220 mcg/ actuation (120) AePB Inhale 2 puffs into the lungs 2 (two) times daily. Controller 1 each 11    montelukast (SINGULAIR) 10 mg tablet Take 1 tablet (10 mg total) by mouth every evening. 30 tablet 11    MULTIVIT,CALC,MINS/IRON/FOLIC (DAILY MULTIPLE FOR WOMEN ORAL) Take 1 tablet by mouth once daily.      pen needle, diabetic (BD ULTRA-FINE MINI PEN NEEDLE) 31 gauge x 3/16" Ndle Use 5 a day 200 each 3    pregabalin (LYRICA) 150 MG capsule Take 1 capsule (150 mg total) by mouth 2 (two) times daily. 60 capsule 3    promethazine (PHENERGAN) 25 MG tablet       spironolactone (ALDACTONE) 25 MG tablet Take 1 tablet (25 mg total) by mouth once daily. 30 tablet 6    TOUJEO MAX U-300 SOLOSTAR 300 unit/mL (3 mL) InPn Inject 96 units once daily at bedtime 15 mL 3    valACYclovir (VALTREX) 1000 MG tablet Take 1 tablet (1,000 mg total) by mouth once daily. 30 tablet 11    valsartan (DIOVAN) 320 MG tablet Take 1 tablet (320 mg total) by mouth once daily. 90 tablet 3    zolpidem (AMBIEN) 10 mg Tab Take 1 tablet by mouth at bedtime 30 tablet 1    ARIPiprazole (ABILIFY) 10 MG Tab Take 1 tablet (10 mg total) by mouth once daily. 30 tablet 2    atorvastatin (LIPITOR) 20 MG tablet Take 1 tablet (20 mg total) by mouth every evening. 30 tablet 1    cyclobenzaprine (FLEXERIL) 10 MG " tablet Take 1 tablet (10 mg total) by mouth daily as needed (for back muscle spasms). 30 tablet 2    cyclosporine 0.05 % Drop Place 1 drop into both eyes 2 (two) times daily. 5.5 mL 11    insulin aspart U-100 (NOVOLOG FLEXPEN U-100 INSULIN) 100 unit/mL (3 mL) InPn pen Inject before meals three times a day up to 30 units daily 15 mL 3    insulin aspart U-100 (NOVOLOG FLEXPEN U-100 INSULIN) 100 unit/mL (3 mL) InPn pen Inject before meals three times a day up to 30 units daily 15 mL 3    clotrimazole-betamethasone 1-0.05% (LOTRISONE) cream Apply to affected area 2 times daily 15 g 1    topiramate (TOPAMAX) 50 MG tablet Take 1 tablet (50 mg total) by mouth 2 (two) times daily. 60 tablet 11    ALPRAZolam (XANAX) 1 MG tablet Take 1 tablet by mouth twice daily (Patient not taking: Reported on 10/5/2020) 60 tablet 0    amLODIPine (NORVASC) 10 MG tablet Take 1 tablet (10 mg total) by mouth once daily. 30 tablet 3    hydrOXYzine pamoate (VISTARIL) 25 MG Cap Take 1 capsule by mouth every day (Patient not taking: Reported on 10/5/2020) 30 capsule 0    temazepam (RESTORIL) 15 mg Cap Take 1 capsule by mouth at bedtime (Patient not taking: Reported on 10/5/2020) 30 capsule 1     No facility-administered medications prior to visit.         Medications Discontinued During This Encounter   Medication Reason    ALPRAZolam (XANAX) 1 MG tablet Patient no longer taking    amLODIPine (NORVASC) 10 MG tablet Duplicate Order    hydrOXYzine pamoate (VISTARIL) 25 MG Cap Patient no longer taking    temazepam (RESTORIL) 15 mg Cap Patient no longer taking        Medications Ordered This Encounter   Medications    frovatriptan (FROVA) 2.5 MG tablet     Sig: Take 1 tablet at onset of acute migraine. May take 1 more tablet 2 hours later if needed. (MAX 2 TABLET PER DAY. MAX 4 TABLETS PER WEEK.)     Dispense:  9 tablet     Refill:  5       Subjective   Review of Systems   Constitutional: Negative for chills and fever.   Respiratory:  "Negative for chest tightness and shortness of breath.    Endocrine: Negative for polydipsia and polyuria.   Neurological: Positive for headaches. Negative for seizures, syncope, facial asymmetry, speech difficulty and light-headedness.        Objective   Vitals:    10/05/20 1111   BP: 100/60   BP Location: Right arm   Patient Position: Sitting   BP Method: Large (Manual)   Pulse: 95   Temp: 97.2 °F (36.2 °C)   TempSrc: Tympanic   SpO2: 95%   Weight: 94.7 kg (208 lb 12.4 oz)   Height: 4' 8" (1.422 m)     Physical Exam  Vitals signs reviewed.   Constitutional:       General: She is not in acute distress.     Appearance: Normal appearance. She is not ill-appearing or diaphoretic.   Eyes:      General: No scleral icterus.     Extraocular Movements: Extraocular movements intact.      Conjunctiva/sclera: Conjunctivae normal.   Cardiovascular:      Rate and Rhythm: Normal rate and regular rhythm.   Pulmonary:      Effort: Pulmonary effort is normal.      Breath sounds: Normal breath sounds.   Skin:     General: Skin is warm and dry.   Neurological:      General: No focal deficit present.      Mental Status: She is alert and oriented to person, place, and time.      Cranial Nerves: No cranial nerve deficit.      Motor: No weakness.   Psychiatric:         Mood and Affect: Mood normal.         Behavior: Behavior normal.         Judgment: Judgment normal.           Documentation entered by me for this encounter may have been done in part using speech-recognition technology. Although I have made an effort to ensure accuracy, "sound like" errors may exist and should be interpreted in context. -DOMINIK Beach MD.    "

## 2020-10-05 NOTE — PROGRESS NOTES
Physical Therapy Daily Treatment  Note     Name: Yolanda Betts  Clinic Number: [id number]    Therapy Diagnosis:   Encounter Diagnoses   Name Primary?    Proximal muscle weakness     Poor posture     Decreased right shoulder range of motion      Physician: Maikel Redmond MD    Visit Date: 10/5/2020    Physician Orders: PT Eval and Treat  Medical Diagnosis from Referral: R shoulder pain  Evaluation Date: 8/31/2020  Authorization Period Expiration: 12/31/2020  Plan of Care Expiration: 11/23/2020  Visit # / Visits authorized: 5 (4 of 20)      Precautions: Standard, Diabetes and Bipolar disored, schizoaffective disorder    Time In: 10:00 am  Time Out: 10:45 am  Total Billable Time: 40 minutes    SUBJECTIVE     Pt reports: States the shoulder continues to feel stiff nut loosens up once it gets moving. States she woke up with increased low back pain this morning. This is a chronic problem.   Patient was compliant with home exercise program   Response to previous treatment: sore following last session   Functional change: sweeping, mopping, combing hair, and any repetitive motion. Overhead movement with no issues and putting seat belt on with less struggle    Pre-Treatment Pain: 5/10  Post-Treatment Pain: 3/10  Location: R shoulder and scapular      TREATMENT     Yolanda received therapeutic exercises to develop strength, ROM, posture and core stabilization for 30 minutes including:    Exercise 10/5/2020   Rope and pulley  3 minutes each   Flexion, extension, external rotation    Upper trapezius stretch  3x20 secons   Manual resisted elbow flexion and extension AROM  ~1 minute on R deferred today   Shoulder flexion table slides     R shoulder PROM  Flexion, abduction, IR and ER, scaption, adduction  10 minutes    R shoulder isometrics  2 x 10 in each direction  Flexion, extension, internal rotation, external rotation, abduction deferred today               Yolanda received the following manual therapy techniques:  Joint mobilizations and Soft tissue Mobilization were applied to the: R shoulder for 10 minutes, including:  STM of right upper trapezius, biceps brachii, deltoid, supraspinatus , infraspinatus , teres major and minor , subscapularis  and periscapular musculature  Grade II glenohumeral distraction and inferior glide     Home Exercises Provided and Patient Education Provided     Education/Self-Care provided:    Patient educated on biomechanical justification for therapeutic exercise and importance of compliance with HEP in order to improve overall impairments and QOL    Patient educated on the importance of improved core and upper extremity strength in order to improve alignment of the spine and upper extremities with static positions and dynamic movement.    Educated on needing to complete shoulder rehab before addressing low back referral     Written Home Exercises Provided: yes.  Exercises were reviewed and Yolanda was able to demonstrate them prior to the end of the session.  Yolanda demonstrated good  understanding of the education provided.     See EMR under Patient Instructions for exercises provided 9/21/2020.    ASSESSMENT   Patient tolerates exercises well with reports of tolerable discomfort in R shoulder with flexion and abduction and with good internal and external rotation range of motion. Patient required cues for correct technique with cervical side bending stretches.     Yolanda is progressing well towards her goals.   Pt prognosis is Fair.     Pt will continue to benefit from skilled outpatient physical therapy to address the deficits listed in the problem list box on initial evaluation, provide pt/family education and to maximize pt's level of independence in the home and community environment.     Pt's spiritual, cultural and educational needs considered and pt agreeable to plan of care and goals.     Anticipated Barriers for therapy: co-morbidities, sedentary lifestyle, chronicity of condition, lack of  understanding of condition and adherence to treatment plan    GOALS:     Short Term Goals:  6 weeks     1. Pain: Pt will demonstrate improved pain by reports of less than or equal to 7/10 worst pain on the verbal rating scale in order to progress toward maximal functional ability and improve QOL.     2. Function: Patient will demonstrate improved function as indicated by a functional limitation score of less than or equal to 58 out of 100 on FOTO.     3. Mobility: Patient will improve AROM to 50% of stated goals, listed in objective measures above, in order to progress towards independence with functional activities.      4. Strength: Patient will improve strength to 50% of stated goals, listed in objective measures above, in order to progress towards independence with functional activities.      5. HEP: Patient will demonstrate independence with HEP in order to progress toward functional independence.        Long Term Goals:  12 weeks     1. Pain: Pt will demonstrate improved pain by reports of less than or equal to 5/10 worst pain on the verbal rating scale in order to progress toward maximal functional ability and improve QOL.       2. Function: Patient will demonstrate improved function as indicated by a functional limitation score of less than or equal to 48 out of 100 on FOTO.     3. Mobility: Patient will improve AROM to stated goals, listed in objective measures above, in order to return to maximal functional potential and improve quality of life.     4. Strength: Patient will improve strength to stated goals, listed in objective measures above, in order to improve functional independence and quality of life.     5. Patient will return to normal ADL's, IADL's, community involvement, recreational activities, and work-related activities with less than or equal to 5/10 pain and maximal function.             PLAN   Continue Plan of Care (POC) and progress per patient tolerance.    Evaluation: 8/23/2020  Progress  Note: 9/28/2020  POC Expiration: 11/23/2020    Raz Orozco, PTA

## 2020-10-05 NOTE — ASSESSMENT & PLAN NOTE
BP Readings from Last 6 Encounters:   10/05/20 100/60   10/02/20 122/81   09/24/20 118/68   09/18/20 108/70   09/16/20 (!) 140/70   08/26/20 131/85     Last 5 Patient Entered Readings                                      Current 30 Day Average: 128/83     Recent Readings 9/28/2020 9/23/2020 9/12/2020 9/11/2020 9/10/2020    SBP (mmHg) 115 123 136 140 125    DBP (mmHg) 80 82 80 88 84    Pulse 98 93 101 99 95      Controlled. BP low but asymptomatic today.

## 2020-10-05 NOTE — ASSESSMENT & PLAN NOTE
She says topirimate did not suppress migraines. She says that sumatriptan and rizatriptan did not work. She says that the only medicine that she found that worked was frovatriptan.

## 2020-10-05 NOTE — ASSESSMENT & PLAN NOTE
Lab Results   Component Value Date    CHOL 115 (L) 07/07/2020    CHOL 126 02/18/2020    TRIG 547 (H) 07/07/2020    TRIG 152 (H) 02/18/2020    HDL 31 (L) 07/07/2020    HDL 46 02/18/2020    LDLCALC Invalid, Trig>400.0 07/07/2020    LDLCALC 49.6 (L) 02/18/2020    NONHDLCHOL 84 07/07/2020    NONHDLCHOL 80 02/18/2020    AST 83 (H) 07/28/2020    ALT 56 (H) 07/28/2020   She appears to be tolerating statin for dyslipidemia without apparent symptoms of myositis.

## 2020-10-05 NOTE — ASSESSMENT & PLAN NOTE
Wt Readings from Last 20 Encounters:   10/05/20 94.7 kg (208 lb 12.4 oz)   10/02/20 94.9 kg (209 lb 3.5 oz)   09/24/20 93.4 kg (205 lb 14.6 oz)   09/18/20 93 kg (205 lb 0.4 oz)   09/16/20 92.9 kg (204 lb 12.9 oz)   09/15/20 92.9 kg (204 lb 12.9 oz)   08/26/20 93.7 kg (206 lb 9.1 oz)   08/19/20 93.5 kg (206 lb 2.1 oz)   08/18/20 93.5 kg (206 lb 2.1 oz)   08/18/20 93.5 kg (206 lb 2.1 oz)   07/28/20 92 kg (202 lb 11.4 oz)   07/20/20 94.3 kg (207 lb 14.3 oz)   07/14/20 94.1 kg (207 lb 7.3 oz)   07/10/20 94.2 kg (207 lb 10.8 oz)   07/10/20 94.2 kg (207 lb 10.8 oz)   07/09/20 94.3 kg (207 lb 14.3 oz)   07/07/20 92.9 kg (204 lb 12.9 oz)   06/26/20 94.2 kg (207 lb 10.8 oz)   06/26/20 94.3 kg (207 lb 14.3 oz)   06/22/20 93.5 kg (206 lb 2.1 oz)   Weight trending up slowly. Therapeutic lifestyle changes encouraged.

## 2020-10-06 DIAGNOSIS — E78.5 DYSLIPIDEMIA ASSOCIATED WITH TYPE 2 DIABETES MELLITUS: ICD-10-CM

## 2020-10-06 DIAGNOSIS — E11.69 DYSLIPIDEMIA ASSOCIATED WITH TYPE 2 DIABETES MELLITUS: ICD-10-CM

## 2020-10-06 DIAGNOSIS — M54.6 ACUTE RIGHT-SIDED THORACIC BACK PAIN: ICD-10-CM

## 2020-10-07 RX ORDER — CYCLOBENZAPRINE HCL 10 MG
10 TABLET ORAL DAILY PRN
Qty: 30 TABLET | Refills: 11 | Status: SHIPPED | OUTPATIENT
Start: 2020-10-07 | End: 2021-09-17 | Stop reason: SDUPTHER

## 2020-10-07 RX ORDER — ATORVASTATIN CALCIUM 20 MG/1
20 TABLET, FILM COATED ORAL NIGHTLY
Qty: 90 TABLET | Refills: 0 | Status: SHIPPED | OUTPATIENT
Start: 2020-10-07 | End: 2021-01-04 | Stop reason: SDUPTHER

## 2020-10-07 NOTE — TELEPHONE ENCOUNTER
She has hepatic function panel scheduled for 10/26/2020.    REFILL APPROVED    Medications Ordered This Encounter   Medications    atorvastatin (LIPITOR) 20 MG tablet     Sig: Take 1 tablet (20 mg total) by mouth every evening.     Dispense:  90 tablet     Refill:  0     LABS REQUIRED FOR MORE REFILLS. She has hepatic function panel scheduled for 10-.

## 2020-10-07 NOTE — TELEPHONE ENCOUNTER
REFILL APPROVED    Medications Ordered This Encounter   Medications    cyclobenzaprine (FLEXERIL) 10 MG tablet     Sig: Take 1 tablet (10 mg total) by mouth daily as needed (for back muscle spasms).     Dispense:  30 tablet     Refill:  11

## 2020-10-08 ENCOUNTER — CLINICAL SUPPORT (OUTPATIENT)
Dept: REHABILITATION | Facility: HOSPITAL | Age: 50
End: 2020-10-08
Payer: MEDICAID

## 2020-10-08 DIAGNOSIS — R29.3 POOR POSTURE: ICD-10-CM

## 2020-10-08 DIAGNOSIS — M25.611 DECREASED RIGHT SHOULDER RANGE OF MOTION: ICD-10-CM

## 2020-10-08 DIAGNOSIS — M62.81 PROXIMAL MUSCLE WEAKNESS: ICD-10-CM

## 2020-10-08 PROCEDURE — 97140 MANUAL THERAPY 1/> REGIONS: CPT

## 2020-10-08 PROCEDURE — 97110 THERAPEUTIC EXERCISES: CPT

## 2020-10-08 NOTE — PROGRESS NOTES
Physical Therapy Daily Treatment  Note     Name: Yolanda Betts  Clinic Number: [id number]    Therapy Diagnosis:   Encounter Diagnoses   Name Primary?    Proximal muscle weakness     Poor posture     Decreased right shoulder range of motion      Physician: Maikel Redmond MD    Visit Date: 10/8/2020    Physician Orders: PT Eval and Treat  Medical Diagnosis from Referral: R shoulder pain  Evaluation Date: 8/31/2020  Authorization Period Expiration: 12/31/2020  Plan of Care Expiration: 11/23/2020  Visit # / Visits authorized: 6 (5 of 20)      Precautions: Standard, Diabetes and Bipolar disored, schizoaffective disorder    Time In: 9:30 am  Time Out: 10:15 am  Total Billable Time: 45 minutes    SUBJECTIVE     Pt reports: she is feeling pretty good today. States the shoulder is still stiff and that the doctor is going to need to pull fluid off of it again.   Patient was compliant with home exercise program   Response to previous treatment: sore following last session   Functional change: progression of exercises tolerated well     Pre-Treatment Pain: 7/10  Post-Treatment Pain: 3/10  Location: R shoulder and scapular      TREATMENT     Yolanda received therapeutic exercises to develop strength, ROM, posture and core stabilization for 35 minutes including:    Exercise 10/8/2020   Rope and pulley  3 minutes each   Flexion, extension, external rotation    Upper trapezius stretch     Manual resisted elbow flexion and extension AROM     Shoulder flexion table slides     R shoulder PROM  Flexion, abduction, IR and ER, scaption, adduction  10 minutes    R shoulder isometrics     Bicep Curls  1#, 2 x 10    Shoulder flexion with dowel 2 x 5 (0-45 degrees)   Shoulder external rotation AAROM  2 x 10 with dowel, supine   Supine scapular retractions 2 x 10                Yolanda received the following manual therapy techniques: Joint mobilizations and Soft tissue Mobilization were applied to the: R shoulder for 10 minutes,  including:  STM of right upper trapezius, biceps brachii, deltoid, supraspinatus , infraspinatus , teres major and minor , subscapularis  and periscapular musculature      Home Exercises Provided and Patient Education Provided     Education/Self-Care provided:    Patient educated on biomechanical justification for therapeutic exercise and importance of compliance with HEP in order to improve overall impairments and QOL    Patient educated on the importance of improved core and upper extremity strength in order to improve alignment of the spine and upper extremities with static positions and dynamic movement.    Educated on needing to complete shoulder rehab before addressing low back referral     Written Home Exercises Provided: yes.  Exercises were reviewed and Yolanda was able to demonstrate them prior to the end of the session.  Yolanda demonstrated good  understanding of the education provided.     See EMR under Patient Instructions for exercises provided 9/21/2020.    ASSESSMENT   Patient tolerates exercises well with reports of tolerable discomfort in R shoulder throughout. Discomfort in R posterior shoulder with supine shoulder flexion. patient demonstrates significant improvement with R shoulder range of motion in all directions with less pain and muscle guarding noted. Patient states that she feels discomfort which is deep and feels like it is coming from inside of the joint. Denies popping or clicking. Patient tolerated soft tissue mobilization well with reports of decreased tension following intervention. Patient left session with reports of feeling good.     Yolanda is progressing well towards her goals.   Pt prognosis is Fair.     Pt will continue to benefit from skilled outpatient physical therapy to address the deficits listed in the problem list box on initial evaluation, provide pt/family education and to maximize pt's level of independence in the home and community environment.     Pt's spiritual,  cultural and educational needs considered and pt agreeable to plan of care and goals.     Anticipated Barriers for therapy: co-morbidities, sedentary lifestyle, chronicity of condition, lack of understanding of condition and adherence to treatment plan    GOALS:     Short Term Goals:  6 weeks     1. Pain: Pt will demonstrate improved pain by reports of less than or equal to 7/10 worst pain on the verbal rating scale in order to progress toward maximal functional ability and improve QOL.     2. Function: Patient will demonstrate improved function as indicated by a functional limitation score of less than or equal to 58 out of 100 on FOTO.     3. Mobility: Patient will improve AROM to 50% of stated goals, listed in objective measures above, in order to progress towards independence with functional activities.      4. Strength: Patient will improve strength to 50% of stated goals, listed in objective measures above, in order to progress towards independence with functional activities.      5. HEP: Patient will demonstrate independence with HEP in order to progress toward functional independence.        Long Term Goals:  12 weeks     1. Pain: Pt will demonstrate improved pain by reports of less than or equal to 5/10 worst pain on the verbal rating scale in order to progress toward maximal functional ability and improve QOL.       2. Function: Patient will demonstrate improved function as indicated by a functional limitation score of less than or equal to 48 out of 100 on FOTO.     3. Mobility: Patient will improve AROM to stated goals, listed in objective measures above, in order to return to maximal functional potential and improve quality of life.     4. Strength: Patient will improve strength to stated goals, listed in objective measures above, in order to improve functional independence and quality of life.     5. Patient will return to normal ADL's, IADL's, community involvement, recreational activities, and  work-related activities with less than or equal to 5/10 pain and maximal function.             PLAN   Continue Plan of Care (POC) and progress per patient tolerance.    Evaluation: 8/23/2020  Progress Note: 9/28/2020  POC Expiration: 11/23/2020    Kathy Sanders PT

## 2020-10-09 ENCOUNTER — TELEPHONE (OUTPATIENT)
Dept: RHEUMATOLOGY | Facility: CLINIC | Age: 50
End: 2020-10-09

## 2020-10-09 ENCOUNTER — TELEPHONE (OUTPATIENT)
Dept: PHARMACY | Facility: CLINIC | Age: 50
End: 2020-10-09

## 2020-10-09 ENCOUNTER — TELEPHONE (OUTPATIENT)
Dept: INTERNAL MEDICINE | Facility: CLINIC | Age: 50
End: 2020-10-09

## 2020-10-09 RX ORDER — INSULIN ASPART 100 [IU]/ML
INJECTION, SOLUTION INTRAVENOUS; SUBCUTANEOUS
Qty: 90 ML | Refills: 0 | Status: SHIPPED | OUTPATIENT
Start: 2020-10-09 | End: 2020-10-19 | Stop reason: SDUPTHER

## 2020-10-09 NOTE — TELEPHONE ENCOUNTER
----- Message from Antonio Matta sent at 10/9/2020  3:19 PM CDT -----  Regarding: Consuelo [facility]  Would like a call from nurse in regards to a prior authorization  for Lyrica . Please call back at [phone].       Thank you,   Antonio Matta

## 2020-10-09 NOTE — TELEPHONE ENCOUNTER
Returned call. [facility] member stated that there isn't anyone by the name of Consuelo in her PA department and they do not make outgoing calls.

## 2020-10-09 NOTE — TELEPHONE ENCOUNTER
The prior authorization for Yolanda Betts's generic Frova has been submitted on 10/9/2020 @ 3:57pm.  It can take up to 72 hours for a decision to be rendered from the insurance.  Patient is aware of PA and process.  Please let me know if you have any questions.    Patient stated in 2010 she tried and failed other triptans including Sumatriptan, Rizatriptan and Replax with gave her throat spasms.  Frova has been the only medication in the Triptan category that she has been able to tolerate.    Thank You!   Consuelo Wolfe CPhT, B.A  Patient Care Advocate   [facility]  Phone: [phone] Ext [phone]  Fax: [phone]

## 2020-10-09 NOTE — TELEPHONE ENCOUNTER
----- Message from Antonio Matta sent at 10/9/2020  3:18 PM CDT -----  Regarding: [facility]  Would like a call from nurse in regards to prior authorization for Frovatriptan. Please call back at [phone].         Thank you,   Antonio Matta

## 2020-10-09 NOTE — TELEPHONE ENCOUNTER
The prior authorization for Yolanda Betts's Pregabalin 150mg has been submitted on 10/9/2020 @ 3:57pom.  It can take up to 72 hours for a decision to be rendered from the insurance.  Patient is aware of PA and process.  Please let me know if you have any questions.    Thank You!   Consuelo Wolfe CPhT, B.A  Patient Care Advocate   [facility]  Phone: [phone] Ext [phone]  Fax: [phone]

## 2020-10-12 ENCOUNTER — TELEPHONE (OUTPATIENT)
Dept: PHARMACY | Facility: CLINIC | Age: 50
End: 2020-10-12

## 2020-10-12 ENCOUNTER — CLINICAL SUPPORT (OUTPATIENT)
Dept: REHABILITATION | Facility: HOSPITAL | Age: 50
End: 2020-10-12
Payer: MEDICAID

## 2020-10-12 DIAGNOSIS — R29.3 POOR POSTURE: ICD-10-CM

## 2020-10-12 DIAGNOSIS — M62.81 PROXIMAL MUSCLE WEAKNESS: ICD-10-CM

## 2020-10-12 DIAGNOSIS — M25.611 DECREASED RIGHT SHOULDER RANGE OF MOTION: ICD-10-CM

## 2020-10-12 PROCEDURE — 97110 THERAPEUTIC EXERCISES: CPT

## 2020-10-12 RX ORDER — INSULIN ASPART 100 [IU]/ML
INJECTION, SOLUTION INTRAVENOUS; SUBCUTANEOUS
Qty: 15 ML | Refills: 0 | Status: SHIPPED | OUTPATIENT
Start: 2020-10-12 | End: 2020-10-29 | Stop reason: SDUPTHER

## 2020-10-12 NOTE — PROGRESS NOTES
Physical Therapy Daily Treatment  Note     Name: Yolanda Betts  Clinic Number: [id number]    Therapy Diagnosis:   Encounter Diagnoses   Name Primary?    Proximal muscle weakness     Poor posture     Decreased right shoulder range of motion      Physician: Maikel Redmond MD    Visit Date: 10/12/2020    Physician Orders: PT Eval and Treat  Medical Diagnosis from Referral: R shoulder pain  Evaluation Date: 8/31/2020  Authorization Period Expiration: 12/31/2020  Plan of Care Expiration: 11/23/2020  Visit # / Visits authorized: 7 (6 of 20)      Precautions: Standard, Diabetes and Bipolar disored, schizoaffective disorder    Time In: 9:50 am  Time Out: 10:28 am  Total Billable Time: 38 minutes    SUBJECTIVE     Pt reports: she is feeling pretty good today. States range of motion has improved significantly and she is able to do more throughout the day with less discomfort.    Patient was compliant with home exercise program   Response to previous treatment: sore following last session   Functional change: progression of exercises tolerated well     Pre-Treatment Pain: 6/10  Post-Treatment Pain: 3/10  Location: R shoulder and scapular      TREATMENT     Yolanda received therapeutic exercises to develop strength, ROM, posture and core stabilization for 28 minutes including:    Exercise 10/12/2020   Rope and pulley     Upper trapezius stretch     Manual resisted elbow flexion and extension AROM     Shoulder flexion table slides     R shoulder PROM  Flexion, abduction, IR and ER, scaption, adduction  10 minutes    R shoulder isometrics     Bicep Curls  2#, 2 x 10    Shoulder flexion with dowel 20x 2 sec hold at end range      Shoulder external rotation AAROM     Seated scapular retractions 2 x 10    Side lying shoulder external rotation  2 x 10 on R            Yolanda received the following manual therapy techniques: Joint mobilizations and Soft tissue Mobilization were applied to the: R shoulder for 10 minutes,  including:  STM of right biceps brachii, deltoid, supraspinatus , infraspinatus , teres major and minor , subscapularis  and periscapular musculature  Grade III glenohumeral distraction and inferior glide     Home Exercises Provided and Patient Education Provided     Education/Self-Care provided:    Patient educated on biomechanical justification for therapeutic exercise and importance of compliance with HEP in order to improve overall impairments and QOL    Patient educated on the importance of improved core and upper extremity strength in order to improve alignment of the spine and upper extremities with static positions and dynamic movement.    Educated on needing to complete shoulder rehab before addressing low back referral     Written Home Exercises Provided: yes.  Exercises were reviewed and Yolanda was able to demonstrate them prior to the end of the session.  Yolanda demonstrated good  understanding of the education provided.     See EMR under Patient Instructions for exercises provided 9/21/2020.    ASSESSMENT   Patient tolerated manual therapy well with reports of significantly less discomfort to palpation compared to previous visits. Decreased tone noted in R shoulder musculature. Patient tolerates passive range of motion well with significant improvements noted in range of motion. Full shoulder external rotation, internal rotation, and horizontal adduction accomplished. She noted discomfort deep in R shoulder joint with end range flexion and abduction but states discomfort is tolerable. She was able to tolerate progressions in exercise with reports of muscular fatigue but no changes in pain. She was late for her session and therefore we were unable to complete all planned interventions for the day. Patient left session with reports of fatigue and soreness and no changes in overall symptoms.     Yolanda is progressing well towards her goals.   Pt prognosis is Fair.     Pt will continue to benefit from skilled  outpatient physical therapy to address the deficits listed in the problem list box on initial evaluation, provide pt/family education and to maximize pt's level of independence in the home and community environment.     Pt's spiritual, cultural and educational needs considered and pt agreeable to plan of care and goals.     Anticipated Barriers for therapy: co-morbidities, sedentary lifestyle, chronicity of condition, lack of understanding of condition and adherence to treatment plan    GOALS:     Short Term Goals:  6 weeks     1. Pain: Pt will demonstrate improved pain by reports of less than or equal to 7/10 worst pain on the verbal rating scale in order to progress toward maximal functional ability and improve QOL.     2. Function: Patient will demonstrate improved function as indicated by a functional limitation score of less than or equal to 58 out of 100 on FOTO.     3. Mobility: Patient will improve AROM to 50% of stated goals, listed in objective measures above, in order to progress towards independence with functional activities.      4. Strength: Patient will improve strength to 50% of stated goals, listed in objective measures above, in order to progress towards independence with functional activities.      5. HEP: Patient will demonstrate independence with HEP in order to progress toward functional independence.        Long Term Goals:  12 weeks     1. Pain: Pt will demonstrate improved pain by reports of less than or equal to 5/10 worst pain on the verbal rating scale in order to progress toward maximal functional ability and improve QOL.       2. Function: Patient will demonstrate improved function as indicated by a functional limitation score of less than or equal to 48 out of 100 on FOTO.     3. Mobility: Patient will improve AROM to stated goals, listed in objective measures above, in order to return to maximal functional potential and improve quality of life.     4. Strength: Patient will improve  strength to stated goals, listed in objective measures above, in order to improve functional independence and quality of life.     5. Patient will return to normal ADL's, IADL's, community involvement, recreational activities, and work-related activities with less than or equal to 5/10 pain and maximal function.             PLAN   Continue Plan of Care (POC) and progress per patient tolerance.    Evaluation: 8/23/2020  Progress Note: 9/28/2020  POC Expiration: 11/23/2020    Kathy Sanders, PT

## 2020-10-12 NOTE — TELEPHONE ENCOUNTER
Good Morning,     The prior authorization for Yolanda Betts's Pregabalin 150mg prescription has been APPROVED FROM 10/9/2020 TO 10/9/2021 with copayment of $0.00.       Patient has been notified of the decision on 10/12/2020 and patient states she will  medication today at [facility].    If there are any additional questions or concerns, please contact me.    Thank You!   Consuelo Wolfe CPhT, B.A  Patient Care Advocate   [facility]  Phone: [phone] Ext [phone]  Fax: [phone]

## 2020-10-12 NOTE — TELEPHONE ENCOUNTER
----- Message from Natalie Tovar MA sent at 10/9/2020  3:55 PM CDT -----  Regarding: PLease call Dexcon  y Team,  This patient needs her Dexcom sensor ASAP!!! Please check with dexcom and contact patient.    -YC

## 2020-10-12 NOTE — TELEPHONE ENCOUNTER
Good Morning,     The prior authorization for Yolanda Betts's Frovatriptan 2.5mg prescription has been APPROVED FROM 10/9/2020 TO 10/9/2021 with copayment of $0.00.        Patient has been notified of the decision on 10/12/2020 and patient states she will  medication today at [facility].     If there are any additional questions or concerns, please contact me.     Thank You!   Consuelo Wolfe CPhT, B.A  Patient Care Advocate   [facility]  Phone: [phone] Ext [phone]  Fax: [phone]

## 2020-10-14 ENCOUNTER — OFFICE VISIT (OUTPATIENT)
Dept: OPHTHALMOLOGY | Facility: CLINIC | Age: 50
End: 2020-10-14
Payer: MEDICAID

## 2020-10-14 DIAGNOSIS — H52.13 MYOPIA WITH PRESBYOPIA, BILATERAL: ICD-10-CM

## 2020-10-14 DIAGNOSIS — H52.4 MYOPIA WITH PRESBYOPIA, BILATERAL: ICD-10-CM

## 2020-10-14 DIAGNOSIS — E11.9 TYPE 2 DIABETES MELLITUS WITHOUT RETINOPATHY: Primary | ICD-10-CM

## 2020-10-14 DIAGNOSIS — H25.13 NUCLEAR SCLEROSIS, BILATERAL: ICD-10-CM

## 2020-10-14 PROCEDURE — 99213 OFFICE O/P EST LOW 20 MIN: CPT | Mod: PBBFAC | Performed by: OPTOMETRIST

## 2020-10-14 PROCEDURE — 92014 PR EYE EXAM, EST PATIENT,COMPREHESV: ICD-10-PCS | Mod: S$PBB,,, | Performed by: OPTOMETRIST

## 2020-10-14 PROCEDURE — 99999 PR PBB SHADOW E&M-EST. PATIENT-LVL III: ICD-10-PCS | Mod: PBBFAC,,, | Performed by: OPTOMETRIST

## 2020-10-14 PROCEDURE — 92015 DETERMINE REFRACTIVE STATE: CPT | Mod: ,,, | Performed by: OPTOMETRIST

## 2020-10-14 PROCEDURE — 92014 COMPRE OPH EXAM EST PT 1/>: CPT | Mod: S$PBB,,, | Performed by: OPTOMETRIST

## 2020-10-14 PROCEDURE — 99999 PR PBB SHADOW E&M-EST. PATIENT-LVL III: CPT | Mod: PBBFAC,,, | Performed by: OPTOMETRIST

## 2020-10-14 PROCEDURE — 92015 PR REFRACTION: ICD-10-PCS | Mod: ,,, | Performed by: OPTOMETRIST

## 2020-10-14 NOTE — PROGRESS NOTES
HPI     Diabetic Eye Exam     Comments: Yearly              Comments     Patient last visit with [facility] on 05/22/2019.  Diabetic eye exam  Diagnosed with diabetes in 2000  Recent vision fluctuations Yes  Lab Results       Component                Value               Date                       HGBA1C                   11.6 (H)            07/07/2020              HPI    Any vision changes since last exam: Yes, trouble with near vision while   wearing glasses.  Eye pain: No  Other ocular symptoms: Dry Eyes, using Restasis BID OU    Do you wear currently wear glasses or contacts? Glasses    Interested in contacts today? No    Do you plan on getting new glasses today? Yes              Last edited by Deborah Pinto on 10/14/2020 11:14 AM. (History)            Assessment /Plan     For exam results, see Encounter Report.    Type 2 diabetes mellitus without retinopathy  No diabetic retinopathy in either eye  Continue close care with PCP   Monitor 12 months    Nuclear sclerosis, bilateral  Surgery is not indicated at this time.   Monitor 12 months.    Myopia with Presbyopia  Eyeglass Final Rx     Eyeglass Final Rx       Sphere Add    Right -1.75 +2.00    Left -3.25 +2.00    Expiration Date: 10/15/2021                    RTC 1 yr for dilated eye exam or PRN if any problems.   Discussed above and answered questions.

## 2020-10-15 ENCOUNTER — CLINICAL SUPPORT (OUTPATIENT)
Dept: REHABILITATION | Facility: HOSPITAL | Age: 50
End: 2020-10-15
Payer: MEDICAID

## 2020-10-15 DIAGNOSIS — M25.611 DECREASED RIGHT SHOULDER RANGE OF MOTION: ICD-10-CM

## 2020-10-15 DIAGNOSIS — R29.3 POOR POSTURE: ICD-10-CM

## 2020-10-15 DIAGNOSIS — M62.81 PROXIMAL MUSCLE WEAKNESS: ICD-10-CM

## 2020-10-15 PROCEDURE — 97110 THERAPEUTIC EXERCISES: CPT

## 2020-10-15 NOTE — PROGRESS NOTES
Physical Therapy Daily Treatment  Note     Name: Yolanda Jacobsston  Clinic Number: [id number]    Therapy Diagnosis:   Encounter Diagnoses   Name Primary?    Proximal muscle weakness     Poor posture     Decreased right shoulder range of motion      Physician: Maikel Redmond MD    Visit Date: 10/15/2020    Physician Orders: PT Eval and Treat  Medical Diagnosis from Referral: R shoulder pain  Evaluation Date: 8/31/2020  Authorization Period Expiration: 12/31/2020  Plan of Care Expiration: 11/23/2020  Visit # / Visits authorized: 8 (7 of 20)      Precautions: Standard, Diabetes and Bipolar disored, schizoaffective disorder    Time In: 10:55 am  Time Out: 11:45 am  Total Billable Time: 50 minutes    SUBJECTIVE     Pt reports: no significant changes since previous session.    Patient was compliant with home exercise program   Response to previous treatment: sore following last session   Functional change: progression of exercises tolerated well with reports of increased fatigue     Pre-Treatment Pain: 7/10  Post-Treatment Pain: 6/10  Location: R shoulder and scapular      TREATMENT     Yolanda received therapeutic exercises to develop strength, ROM, posture and core stabilization for 30 minutes including: (exercises performed today listed in bold)    Exercise    Rope and pulley  3 minutes each  Flexion and abduction   Upper trapezius stretch     Manual resisted elbow flexion and extension AROM     Shoulder flexion table slides     R shoulder PROM  Flexion, abduction, IR and ER, scaption, adduction  10 minutes    R shoulder isometrics     Bicep Curls  2#, 3 x 10   seated   Shoulder flexion with dowel 1# dowel, 2 minutes, 3 second holds        Shoulder external rotation AAROM     Seated scapular retractions Yellow band, 3 x 10    Side lying shoulder external rotation  2 x 10 on R    Supine serratus punches  1# dowel, 3 x 10        Yolanda received the following manual therapy techniques: Joint mobilizations and Soft  tissue Mobilization were applied to the: R shoulder for 20 minutes, including:  STM of right biceps brachii, deltoid, supraspinatus , infraspinatus , teres major and minor , subscapularis  and periscapular musculature  Grade III glenohumeral distraction and inferior glide     Home Exercises Provided and Patient Education Provided     Education/Self-Care provided:    Patient educated on biomechanical justification for therapeutic exercise and importance of compliance with HEP in order to improve overall impairments and QOL    Patient educated on the importance of improved core and upper extremity strength in order to improve alignment of the spine and upper extremities with static positions and dynamic movement.    Educated on needing to complete shoulder rehab before addressing low back referral     Written Home Exercises Provided: yes.  Exercises were reviewed and Yolanda was able to demonstrate them prior to the end of the session.  Yolanda demonstrated good  understanding of the education provided.     See EMR under Patient Instructions for exercises provided 9/21/2020.    ASSESSMENT   Patient tolerated manual therapy well with reports of tolerable discomfort during intervention and reports decreased tension in the shoulder following intervention. Patient tolerates passive range of motion well with nearly full R shoulder ROM noted; discomfort reported at end range. She was able to tolerate progressions in exercise with reports of muscular fatigue but no changes in pain. Patient left session with reports of fatigue and soreness; mild improvement in overall pain and muscular tension following session.     Yolanda is progressing well towards her goals.   Pt prognosis is Fair.     Pt will continue to benefit from skilled outpatient physical therapy to address the deficits listed in the problem list box on initial evaluation, provide pt/family education and to maximize pt's level of independence in the home and community  environment.     Pt's spiritual, cultural and educational needs considered and pt agreeable to plan of care and goals.     Anticipated Barriers for therapy: co-morbidities, sedentary lifestyle, chronicity of condition, lack of understanding of condition and adherence to treatment plan    GOALS:     Short Term Goals:  6 weeks     1. Pain: Pt will demonstrate improved pain by reports of less than or equal to 7/10 worst pain on the verbal rating scale in order to progress toward maximal functional ability and improve QOL.     2. Function: Patient will demonstrate improved function as indicated by a functional limitation score of less than or equal to 58 out of 100 on FOTO.     3. Mobility: Patient will improve AROM to 50% of stated goals, listed in objective measures above, in order to progress towards independence with functional activities.      4. Strength: Patient will improve strength to 50% of stated goals, listed in objective measures above, in order to progress towards independence with functional activities.      5. HEP: Patient will demonstrate independence with HEP in order to progress toward functional independence.        Long Term Goals:  12 weeks     1. Pain: Pt will demonstrate improved pain by reports of less than or equal to 5/10 worst pain on the verbal rating scale in order to progress toward maximal functional ability and improve QOL.       2. Function: Patient will demonstrate improved function as indicated by a functional limitation score of less than or equal to 48 out of 100 on FOTO.     3. Mobility: Patient will improve AROM to stated goals, listed in objective measures above, in order to return to maximal functional potential and improve quality of life.     4. Strength: Patient will improve strength to stated goals, listed in objective measures above, in order to improve functional independence and quality of life.     5. Patient will return to normal ADL's, IADL's, community involvement,  recreational activities, and work-related activities with less than or equal to 5/10 pain and maximal function.             PLAN   Continue Plan of Care (POC) and progress per patient tolerance.    Evaluation: 8/23/2020  Progress Note: 9/28/2020  POC Expiration: 11/23/2020    Kathy Sanders PT

## 2020-10-19 ENCOUNTER — TELEPHONE (OUTPATIENT)
Dept: GASTROENTEROLOGY | Facility: CLINIC | Age: 50
End: 2020-10-19

## 2020-10-19 ENCOUNTER — CLINICAL SUPPORT (OUTPATIENT)
Dept: REHABILITATION | Facility: HOSPITAL | Age: 50
End: 2020-10-19
Payer: MEDICAID

## 2020-10-19 ENCOUNTER — CLINICAL SUPPORT (OUTPATIENT)
Dept: DIABETES | Facility: CLINIC | Age: 50
End: 2020-10-19
Payer: MEDICAID

## 2020-10-19 VITALS — WEIGHT: 213.19 LBS | BODY MASS INDEX: 47.96 KG/M2 | HEIGHT: 56 IN

## 2020-10-19 DIAGNOSIS — M62.81 PROXIMAL MUSCLE WEAKNESS: ICD-10-CM

## 2020-10-19 DIAGNOSIS — R29.3 POOR POSTURE: ICD-10-CM

## 2020-10-19 DIAGNOSIS — E11.65 UNCONTROLLED TYPE 2 DIABETES MELLITUS WITH HYPERGLYCEMIA: Primary | ICD-10-CM

## 2020-10-19 DIAGNOSIS — M25.611 DECREASED RIGHT SHOULDER RANGE OF MOTION: ICD-10-CM

## 2020-10-19 PROCEDURE — 99999 PR PBB SHADOW E&M-EST. PATIENT-LVL IV: CPT | Mod: PBBFAC,,, | Performed by: DIETITIAN, REGISTERED

## 2020-10-19 PROCEDURE — G0108 DIAB MANAGE TRN  PER INDIV: HCPCS | Mod: PBBFAC | Performed by: DIETITIAN, REGISTERED

## 2020-10-19 PROCEDURE — 99214 OFFICE O/P EST MOD 30 MIN: CPT | Mod: PBBFAC | Performed by: DIETITIAN, REGISTERED

## 2020-10-19 PROCEDURE — 97110 THERAPEUTIC EXERCISES: CPT

## 2020-10-19 PROCEDURE — 99999 PR PBB SHADOW E&M-EST. PATIENT-LVL IV: ICD-10-PCS | Mod: PBBFAC,,, | Performed by: DIETITIAN, REGISTERED

## 2020-10-19 PROCEDURE — 97140 MANUAL THERAPY 1/> REGIONS: CPT

## 2020-10-19 RX ORDER — INSULIN ASPART 100 [IU]/ML
INJECTION, SOLUTION INTRAVENOUS; SUBCUTANEOUS
Qty: 30 ML | Refills: 0 | Status: SHIPPED | OUTPATIENT
Start: 2020-10-19 | End: 2020-10-21 | Stop reason: SDUPTHER

## 2020-10-19 NOTE — TELEPHONE ENCOUNTER
Patient states that she has been taking Linzess as prescribed and sometimes twice a day but is not having bowel movements. Patient states that she has not had a bowel movement in 2 weeks.

## 2020-10-19 NOTE — PROGRESS NOTES
Physical Therapy Daily Treatment  Note     Name: Yolanda Martin [facility]  Clinic Number: [id number]    Therapy Diagnosis:   Encounter Diagnoses   Name Primary?    Proximal muscle weakness     Poor posture     Decreased right shoulder range of motion      Physician: Maikel Redmond MD    Visit Date: 10/19/2020    Physician Orders: PT Eval and Treat  Medical Diagnosis from Referral: R shoulder pain  Evaluation Date: 8/31/2020  Authorization Period Expiration: 12/31/2020  Plan of Care Expiration: 11/23/2020  Visit # / Visits authorized: 9 (8 of 20)      Precautions: Standard, Diabetes and Bipolar disored, schizoaffective disorder    Time In: 9:30 am  Time Out: 10:25 am  Total Billable Time: 45 minutes    SUBJECTIVE     Pt reports: she is in a lot of pain today. States that she rolled onto the R side in her sleep and has been in excruciating pain all morning.   Patient was compliant with home exercise program   Response to previous treatment: sore following last session   Functional change: progression of exercises tolerated well with reports of increased fatigue     Pre-Treatment Pain: 10/10  Post-Treatment Pain: 7/10  Location: R shoulder and scapular      TREATMENT     Yolanda received therapeutic exercises to develop strength, ROM, posture and core stabilization for 30 minutes including: (exercises performed today listed in bold)    Exercise    Rope and pulley  3 minutes each  Flexion and abduction   Upper trapezius stretch     Manual resisted elbow flexion and extension AROM     Shoulder flexion table slides     R shoulder PROM  Flexion, abduction, IR and ER, scaption, adduction  10 minutes    6 way shoulder isometrics  2 x 10, 1 sec hold on R      Bicep Curls  2#, 3 x 10   seated   Shoulder flexion with dowel 3 x 10        Shoulder external rotation AAROM  30x on R    Seated scapular retractions Yellow band, 3 x 10    Side lying shoulder external rotation  2 x 10 on R    Supine serratus punches  3 x 10 with dowel         Yolanda received the following manual therapy techniques: Joint mobilizations and Soft tissue Mobilization were applied to the: R shoulder for 15 minutes, including:  STM of right biceps brachii, deltoid, supraspinatus , infraspinatus , teres major and minor , subscapularis  and periscapular musculature  Grade III glenohumeral distraction and inferior glide     Moist heat pack applied to R should at end of session for 10 minutes    Home Exercises Provided and Patient Education Provided     Education/Self-Care provided:    Patient educated on biomechanical justification for therapeutic exercise and importance of compliance with HEP in order to improve overall impairments and QOL    Patient educated on the importance of improved core and upper extremity strength in order to improve alignment of the spine and upper extremities with static positions and dynamic movement.    Educated on needing to complete shoulder rehab before addressing low back referral     Written Home Exercises Provided: yes.  Exercises were reviewed and Yolanda was able to demonstrate them prior to the end of the session.  Yolanda demonstrated good  understanding of the education provided.     See EMR under Patient Instructions for exercises provided 9/21/2020.    ASSESSMENT   Patient tolerated manual therapy well with reports of increased discomfort during intervention compared to previous sessions. Decreased tolerance for shoulder passive range of motion; no guarding or resistance noted with motion but patient reports increased pain deep in joint at end range. Patient reports tolerable discomfort with exercises but she is not able to accomplish full range of motion with flexion and external rotation AAROM. Ended session with moist heat to the shoulder which patient states helps to decrease her symptoms significantly. Patient left session with reports of decreased pain.     Yolanda is progressing well towards her goals.   Pt prognosis is Fair.     Pt  will continue to benefit from skilled outpatient physical therapy to address the deficits listed in the problem list box on initial evaluation, provide pt/family education and to maximize pt's level of independence in the home and community environment.     Pt's spiritual, cultural and educational needs considered and pt agreeable to plan of care and goals.     Anticipated Barriers for therapy: co-morbidities, sedentary lifestyle, chronicity of condition, lack of understanding of condition and adherence to treatment plan    GOALS:     Short Term Goals:  6 weeks     1. Pain: Pt will demonstrate improved pain by reports of less than or equal to 7/10 worst pain on the verbal rating scale in order to progress toward maximal functional ability and improve QOL.     2. Function: Patient will demonstrate improved function as indicated by a functional limitation score of less than or equal to 58 out of 100 on FOTO.     3. Mobility: Patient will improve AROM to 50% of stated goals, listed in objective measures above, in order to progress towards independence with functional activities.      4. Strength: Patient will improve strength to 50% of stated goals, listed in objective measures above, in order to progress towards independence with functional activities.      5. HEP: Patient will demonstrate independence with HEP in order to progress toward functional independence.        Long Term Goals:  12 weeks     1. Pain: Pt will demonstrate improved pain by reports of less than or equal to 5/10 worst pain on the verbal rating scale in order to progress toward maximal functional ability and improve QOL.       2. Function: Patient will demonstrate improved function as indicated by a functional limitation score of less than or equal to 48 out of 100 on FOTO.     3. Mobility: Patient will improve AROM to stated goals, listed in objective measures above, in order to return to maximal functional potential and improve quality of life.      4. Strength: Patient will improve strength to stated goals, listed in objective measures above, in order to improve functional independence and quality of life.     5. Patient will return to normal ADL's, IADL's, community involvement, recreational activities, and work-related activities with less than or equal to 5/10 pain and maximal function.             PLAN   Continue Plan of Care (POC) and progress per patient tolerance.    Evaluation: 8/23/2020  Progress Note: 9/28/2020  POC Expiration: 11/23/2020    Kathy Sanders PT

## 2020-10-19 NOTE — PROGRESS NOTES
Diabetes Education  Author: Yara Sanchez RD, CDE  Date: 10/19/2020    Diabetes Care Management Summary  Diabetes Education Record Assessment/Progress: Comprehensive/Group(assessment 7/20/20)  Current Diabetes Risk Level: High     Last A1c:   Lab Results   Component Value Date    HGBA1C 11.6 (H) 07/07/2020     Diabetes Type  Diabetes Type : Type II     Digital Diabetes:   Jany Up, PharmD  Bipin Dyson,     Diabetes History  Diabetes Diagnosis: >10 years  Current Treatment: (Metformin 1000mg 1tab twice daily; amaryl 2mg 2tab ac bfst; bydureon 2mg wkly; toujeo max 90units daily; novolog ac 25-30units (pt occs dosing after meals))  Reviewed Problem List with Patient: Yes   Pt reports inadequate Novolog dispensing - receives 5pens/mos but using 9pens/mos.     Health Maintenance was reviewed today with patient. Discussed with patient importance of routine eye exams, foot exams/foot care, blood work (i.e.: A1c, microalbumin, and lipid), dental visits, yearly flu vaccine, and pneumonia vaccine as indicated by PCP. Patient verbalized understanding.     Health Maintenance Topics with due status: Not Due       Topic Last Completion Date    TETANUS VACCINE 12/21/2017    Colonoscopy 02/28/2018    Lipid Panel 07/07/2020    Hemoglobin A1c 07/07/2020    Mammogram 07/10/2020    Foot Exam 07/14/2020    Eye Exam 10/14/2020     There are no preventive care reminders to display for this patient.    Nutrition  Meal Planning: (Intake ~1200 cals/d. Pt limiting carb 20-40grams/meal, ~15 grams/snack (3x/d). No excess fat, sodium. Improved volume nonstarchy vegetables.)  Meal Plan 24 Hour Recall - Breakfast: oatmeal 1cup (few raisins, nuts) OR 1-2 eggs, salsa, tortilla OR cream wheat  Meal Plan 24 Hour Recall - Lunch: bbq hotdog, 1wheat bread, pork beans  Meal Plan 24 Hour Recall - Dinner: pb/j sandwich (wheat bread) OR 1c crawfish stew w/ rice OR 1c mexican casserole (grd beef-lean and drained, cheese)  Meal Plan 24 Hour  Recall - Snack: nathaly daniel; marco: water; dining out: rare    Monitoring   Self Monitoring : Digital diabetes records reviewed. Dexcom pending insurance change. Per records, ac/pp BG  w/ few 228, 260, 277, 303, 321. Excursions possibly due pt's back pain; she has pain mgmt appt scheduled 11/14. Excursions due novolog bolus after meals.  Blood Glucose Logs: Yes  In the last month, how often have you had a low blood sugar reaction?: never  Can you tell when your blood sugar is too high?: no    Exercise   Exercise Type: (PT 60min 3d/wk. Prior walking 30min 3d/wk but unable due to back pain. Has appt w/ pain mgmt.)    Social History  Preferred Learning Method: Face to Face  Primary Support: Self  Smoking Status: Never a Smoker  Alcohol Use: Never       Barriers to Change  Barriers to Change: None  Learning Challenges : None    Readiness to Learn   Readiness to Learn : Eager    Cultural Influences  Cultural Influences: No    Diabetes Education Assessment/Progress  Diabetes Disease Process (diabetes disease process and treatment options): Discussion, Individual Session, Demonstrates Understanding/Competency(verbalizes/demonstrates)  Nutrition (Incorporating nutritional management into one's lifestyle): Discussion, Individual Session, Demonstrates Understanding/Competency (verbalizes/demonstrates)  Physical Activity (incorporating physical activity into one's lifestyle): Discussion, Individual Session, Demonstrates Understanding/Competency (verbalizes/demonstrates)  Medications (states correct name, dose, onset, peak, duration, side effects & timing of meds): Discussion, Individual Session, Demonstrates Understanding/Competency(verbalizes/demonstrates)  Monitoring (monitoring blood glucose/other parameters & using results): Discussion, Individual Session, Demonstrates Understanding/Competency (verbalizes/demonstrates)  Acute Complications (preventing, detecting, and treating acute complications): Discussion,  Individual Session, Demonstrates Understanding/Competency (verbalizes/demonstrates)  Chronic Complications (preventing, detecting, and treating chronic complications): Demonstrates Understanding/Competency (verbalizes/demonstrates)  Clinical (diabetes, other pertinent medical history, and relevant comorbidities reviewed during visit): Discussion, Individual Session, Demonstrates Understanding/Competency (verbalizes/demonstrates)  Cognitive (knowledge of self-management skills, functional health literacy): Discussion, Individual Session, Demonstrates Understanding/Competency (verbalizes/demonstrates)  Psychosocial (emotional response to diabetes): Demonstrates Understanding/Competency (verbalizes/demonstrates)  Diabetes Distress and Support Systems: Demonstrates Understanding/Competency (verbalizes/demonstrates)  Behavioral (readiness for change, lifestyle practices, self-care behaviors): Discussion, Individual Session, Demonstrates Understanding/Competency (verbalizes/demonstrates)    Goals  Patient has selected/evaluated goals during today's session: Yes, evaluated  Healthy Eating: Set(continue increase nonstarchy vegetables 1+cup 2x/d; continue limit carb 15-30grams/meal)  Met Percentage : 75%  Start Date: 10/19/20(continue use meal plan - carb portions/spacing, increase volume nonstarchy veg 1-2cups/d)  Target Date: 01/14/21  Physical Activity: Set(increase time 40min 3d/wk or increase 4-5d/wk 30min)  Met Percentage : 75%  Start Date: 10/19/20(continue PT as directed)  Target Date: 01/14/21  Monitoring: Set(continue test BG 4x/d; consider Dexcom CGMS)  Met Percentage : 50%  Start Date: 10/19/20  Target Date: 01/14/21  Medications: Set(dose diabetes medications as directed)  Met Percentage : 75%  Start Date: 10/19/20(dose novolog 10min before meals)  Target Date: 01/14/21  Reducing Risks: % Met(obtain hypoglycemic tx (glu tab, soft peppermints))  Met Percentage : 100%     Diabetes Care Plan/Intervention  Education  Plan/Intervention: Individual Follow-Up DSMT(FU w/ Norma, digital diabetes, pcp for medical mgmt.) Coord pt request for Novolog Rx updates (needs 2box dispensed per mos) w/ Norma. RTC ~3mos, prn or as referred.    Diabetes Meal Plan  Restrictions: Low Fat, Low Sodium, Restricted Carbohydrate  Calories: 1200, 1400  Carbohydrate Per Meal: 20-30g, 30-45g  Carbohydrate Per Snack : 7-15g    Today's Self-Management Care Plan was developed with the patient's input and is based on barriers identified during today's assessment.    The long and short-term goals in the care plan were written with the patient/caregiver's input. The patient has agreed to work toward these goals to improve her overall diabetes control.      The patient received a copy of today's self-management plan and verbalized understanding of the care plan, goals, and all of today's instructions.      The patient was encouraged to communicate with her physician and care team regarding her condition(s) and treatment.  I provided the patient with my contact information today and encouraged her to contact me via phone or patient portal as needed.     Education Units of Time   Time Spent: 60 min

## 2020-10-19 NOTE — TELEPHONE ENCOUNTER
----- Message from Neeta Sanders sent at 10/19/2020 11:50 AM CDT -----  Contact: [phone]  Would like to consult with the nurse, patient states that she is not having a bowel Movement,  patient would like to speak with the nurse as soon as possible, please call back thanks sj

## 2020-10-19 NOTE — LETTER
October 19, 2020        DOMINIK Beach MD  [address] [place] [address]             [facility]  [address] [place] [address]  Phone: [phone]  Fax: [phone]   Patient: Yolanda Betts   MR Number: [id number]   YOB: 1970   Date of Visit: 10/19/2020       Dear Dr. Beach:    Thank you for referring Yolanda Betts to me for evaluation. Below are the relevant portions of my assessment and plan of care.     If you have questions, please do not hesitate to call me. I look forward to following Yolanda along with you.    Sincerely,      Yara Sanchez, SOILA, CDE           CC  Ayush Palomares PA-C

## 2020-10-19 NOTE — TELEPHONE ENCOUNTER
----- Message from Nany Russo sent at 10/19/2020  1:55 PM CDT -----  Contact: SELF/[phone]  Type:  Patient Returning Call    Who Called:PATIENT  Who Left Message for Patient:NURSE  Does the patient know what this is regarding?:YES  Would the patient rather a call back or a response via [facility]? CALL BACK  Best Call Back Number:[phone]  Additional Information: Patient  has not had bowel movement in 1 week      RHETT/MANISH

## 2020-10-20 ENCOUNTER — TELEPHONE (OUTPATIENT)
Dept: GASTROENTEROLOGY | Facility: CLINIC | Age: 50
End: 2020-10-20

## 2020-10-20 ENCOUNTER — PATIENT MESSAGE (OUTPATIENT)
Dept: GASTROENTEROLOGY | Facility: CLINIC | Age: 50
End: 2020-10-20

## 2020-10-20 NOTE — TELEPHONE ENCOUNTER
Returned call to pt and informed her that Precious sent her a message in her pt portal stating the following:  Ms. Alpesh, Ms. Mojgan suggest increaseing your dose of Linzess to once in the morning and once in the evening. I have scheduled you a follow up on 10/27/2020 at 10:00am [facility] location.  Pt verbalized understanding and will keep appt on 10/27/20

## 2020-10-20 NOTE — TELEPHONE ENCOUNTER
Did she complete the bottle of magnesium citrate and follow up with daily Miralax as previously instructed?    Message text       You routed conversation to Mojgan Franklin PA-C 21 hours ago (3:37 PM)      You 21 hours ago (3:37 PM)        Patient states that she has been taking Linzess as prescribed and sometimes twice a day but is not having bowel movements. Patient states that she has not had a bowel movement in 2 weeks.         Documentation       You  Yolanda Betts 22 hours ago (3:28 PM)      You 22 hours ago (3:28 PM)        ----- Message from Nany Russo sent at 10/19/2020  1:55 PM CDT -----  Contact: SELF/[phone]  Type:  Patient Returning Call     Who Called:PATIENT  Who Left Message for Patient:NURSE  Does the patient know what this is regarding?:YES  Would the patient rather a call back or a response via [facility]? CALL BACK  Best Call Back Number:[phone]  Additional Information: Patient  has not had bowel movement in 1 week        RHETT/MANISH

## 2020-10-20 NOTE — TELEPHONE ENCOUNTER
----- Message from Karen Orozco sent at 10/20/2020  4:39 PM CDT -----  Type:  Patient Returning Call    Who Called:Yolanda  Who Left Message for Patient:Precious  Does the patient know what this is regarding?:no  Would the patient rather a call back or a response via [facility]? call  Best Call Back Number:[phone]    Additional Information:

## 2020-10-20 NOTE — TELEPHONE ENCOUNTER
Patient states that she has been drinking a bottle of magnesium citrate every two weeks and is taking miralax twice a day along with the Linzess.

## 2020-10-21 RX ORDER — INSULIN ASPART 100 [IU]/ML
INJECTION, SOLUTION INTRAVENOUS; SUBCUTANEOUS
Qty: 30 ML | Refills: 0 | Status: SHIPPED | OUTPATIENT
Start: 2020-10-21 | End: 2020-10-29 | Stop reason: SDUPTHER

## 2020-10-22 ENCOUNTER — CLINICAL SUPPORT (OUTPATIENT)
Dept: REHABILITATION | Facility: HOSPITAL | Age: 50
End: 2020-10-22
Payer: MEDICAID

## 2020-10-22 DIAGNOSIS — M62.81 PROXIMAL MUSCLE WEAKNESS: ICD-10-CM

## 2020-10-22 DIAGNOSIS — M25.611 DECREASED RIGHT SHOULDER RANGE OF MOTION: ICD-10-CM

## 2020-10-22 DIAGNOSIS — R29.3 POOR POSTURE: ICD-10-CM

## 2020-10-22 PROCEDURE — 97110 THERAPEUTIC EXERCISES: CPT

## 2020-10-22 NOTE — PROGRESS NOTES
Physical Therapy Daily Treatment  Note     Name: Yolanda Betts  Clinic Number: [id number]    Therapy Diagnosis:   Encounter Diagnoses   Name Primary?    Proximal muscle weakness     Poor posture     Decreased right shoulder range of motion      Physician: Maikel Redmond MD    Visit Date: 10/22/2020    Physician Orders: PT Eval and Treat  Medical Diagnosis from Referral: R shoulder pain  Evaluation Date: 8/31/2020  Authorization Period Expiration: 12/31/2020  Plan of Care Expiration: 11/23/2020  Visit # / Visits authorized: 10 (9 of 20)      Precautions: Standard, Diabetes and Bipolar disored, schizoaffective disorder    Time In: 11:00 am  Time Out: 11:45 am  Total Billable Time: 45 minutes    SUBJECTIVE     Pt reports: her shoulder has been feeling great, states she is having no shoulder pain today.    Patient was compliant with home exercise program   Response to previous treatment: sore following last session   Functional change: progression of exercises tolerated well with reports of increased fatigue     Pre-Treatment Pain: 0/10  Post-Treatment Pain: 0/10  Location: R shoulder and scapular      TREATMENT     Yolanda received therapeutic exercises to develop strength, ROM, posture and core stabilization for 35 minutes including: (exercises performed today listed in bold)    Exercise    Rope and pulley  3 minutes each  Flexion and abduction   Upper trapezius stretch     Manual resisted elbow flexion and extension AROM     Shoulder flexion table slides     R shoulder PROM  Flexion, abduction, IR and ER, scaption, adduction  5 minutes    6 way shoulder isometrics  2 x 10, 2 sec hold each on R      Bicep Curls  3#, 3 x 10, seated   Shoulder flexion with dowel 3 x 10        Shoulder external rotation AAROM  30x on R    Seated scapular retractions Yellow band, 3 x 10    Side lying shoulder external rotation  2 x 10 on R    Supine serratus punches  3 x 10 with dowel        Yolanda received the following manual  therapy techniques: Joint mobilizations and Soft tissue Mobilization were applied to the: R shoulder for 10 minutes, including:  STM of right biceps brachii, deltoid, supraspinatus  and infraspinatus       Moist heat pack applied to R should at end of session for 0 minutes    Home Exercises Provided and Patient Education Provided     Education/Self-Care provided:    Patient educated on biomechanical justification for therapeutic exercise and importance of compliance with HEP in order to improve overall impairments and QOL    Patient educated on the importance of improved core and upper extremity strength in order to improve alignment of the spine and upper extremities with static positions and dynamic movement.    Educated on needing to complete shoulder rehab before addressing low back referral     Written Home Exercises Provided: yes.  Exercises were reviewed and Yolanda was able to demonstrate them prior to the end of the session.  Yolanda demonstrated good  understanding of the education provided.     See EMR under Patient Instructions for exercises provided 9/21/2020.    ASSESSMENT   Patient tolerated manual therapy well with reports of minimal discomfort during intervention compared to previous sessions. Full shoulder PROM obtained with minimal pain at end range. All exercises tolerated well with reports of significant fatigue in shoulder musculature but no pain or discomfort during interventions. Patient left session with reports of feeling good, states she has no pain.      Yolanda is progressing well towards her goals.   Pt prognosis is Fair.     Pt will continue to benefit from skilled outpatient physical therapy to address the deficits listed in the problem list box on initial evaluation, provide pt/family education and to maximize pt's level of independence in the home and community environment.     Pt's spiritual, cultural and educational needs considered and pt agreeable to plan of care and goals.      Anticipated Barriers for therapy: co-morbidities, sedentary lifestyle, chronicity of condition, lack of understanding of condition and adherence to treatment plan    GOALS:     Short Term Goals:  6 weeks     1. Pain: Pt will demonstrate improved pain by reports of less than or equal to 7/10 worst pain on the verbal rating scale in order to progress toward maximal functional ability and improve QOL.     2. Function: Patient will demonstrate improved function as indicated by a functional limitation score of less than or equal to 58 out of 100 on FOTO.     3. Mobility: Patient will improve AROM to 50% of stated goals, listed in objective measures above, in order to progress towards independence with functional activities.      4. Strength: Patient will improve strength to 50% of stated goals, listed in objective measures above, in order to progress towards independence with functional activities.      5. HEP: Patient will demonstrate independence with HEP in order to progress toward functional independence.        Long Term Goals:  12 weeks     1. Pain: Pt will demonstrate improved pain by reports of less than or equal to 5/10 worst pain on the verbal rating scale in order to progress toward maximal functional ability and improve QOL.       2. Function: Patient will demonstrate improved function as indicated by a functional limitation score of less than or equal to 48 out of 100 on FOTO.     3. Mobility: Patient will improve AROM to stated goals, listed in objective measures above, in order to return to maximal functional potential and improve quality of life.     4. Strength: Patient will improve strength to stated goals, listed in objective measures above, in order to improve functional independence and quality of life.     5. Patient will return to normal ADL's, IADL's, community involvement, recreational activities, and work-related activities with less than or equal to 5/10 pain and maximal function.              PLAN   Continue Plan of Care (POC) and progress per patient tolerance.    Evaluation: 8/23/2020  Progress Note: 9/28/2020  POC Expiration: 11/23/2020    Kathy Sanders PT

## 2020-10-23 ENCOUNTER — TELEPHONE (OUTPATIENT)
Dept: OBSTETRICS AND GYNECOLOGY | Facility: CLINIC | Age: 50
End: 2020-10-23

## 2020-10-23 NOTE — TELEPHONE ENCOUNTER
----- Message from Vanessa Mares sent at 10/23/2020  3:32 PM CDT -----  Regarding: reschedule her appt  Contact: pt  Caller is requesting a call back regarding rescheduling her appt.  Please call back at [phone].  Thanks.

## 2020-10-23 NOTE — TELEPHONE ENCOUNTER
Returned call to patient.  She requested to rescheduled wwe for a late appt on 11/03/20 with any provider at [facility].  Appt scheduled for 11/03/20 at 4:00 pm.  She confirmed appt, provider and location.  She verbalized understanding of the current visitor and mask policies.

## 2020-10-24 ENCOUNTER — LAB VISIT (OUTPATIENT)
Dept: OTOLARYNGOLOGY | Facility: CLINIC | Age: 50
End: 2020-10-24
Payer: MEDICAID

## 2020-10-24 DIAGNOSIS — R06.02 SHORTNESS OF BREATH: ICD-10-CM

## 2020-10-24 PROCEDURE — U0003 INFECTIOUS AGENT DETECTION BY NUCLEIC ACID (DNA OR RNA); SEVERE ACUTE RESPIRATORY SYNDROME CORONAVIRUS 2 (SARS-COV-2) (CORONAVIRUS DISEASE [COVID-19]), AMPLIFIED PROBE TECHNIQUE, MAKING USE OF HIGH THROUGHPUT TECHNOLOGIES AS DESCRIBED BY CMS-2020-01-R: HCPCS

## 2020-10-25 LAB — SARS-COV-2 RNA RESP QL NAA+PROBE: NOT DETECTED

## 2020-10-26 ENCOUNTER — CLINICAL SUPPORT (OUTPATIENT)
Dept: REHABILITATION | Facility: HOSPITAL | Age: 50
End: 2020-10-26
Payer: MEDICAID

## 2020-10-26 ENCOUNTER — LAB VISIT (OUTPATIENT)
Dept: LAB | Facility: HOSPITAL | Age: 50
End: 2020-10-26
Attending: FAMILY MEDICINE
Payer: MEDICAID

## 2020-10-26 DIAGNOSIS — M62.81 PROXIMAL MUSCLE WEAKNESS: ICD-10-CM

## 2020-10-26 DIAGNOSIS — I15.2 HYPERTENSION COMPLICATING DIABETES: Chronic | ICD-10-CM

## 2020-10-26 DIAGNOSIS — Z79.4 TYPE 2 DIABETES MELLITUS WITH HYPERGLYCEMIA, WITH LONG-TERM CURRENT USE OF INSULIN: Chronic | ICD-10-CM

## 2020-10-26 DIAGNOSIS — K76.0 NAFLD (NONALCOHOLIC FATTY LIVER DISEASE): Chronic | ICD-10-CM

## 2020-10-26 DIAGNOSIS — E11.59 HYPERTENSION COMPLICATING DIABETES: Chronic | ICD-10-CM

## 2020-10-26 DIAGNOSIS — M25.611 DECREASED RIGHT SHOULDER RANGE OF MOTION: ICD-10-CM

## 2020-10-26 DIAGNOSIS — E11.65 TYPE 2 DIABETES MELLITUS WITH HYPERGLYCEMIA, WITH LONG-TERM CURRENT USE OF INSULIN: Chronic | ICD-10-CM

## 2020-10-26 DIAGNOSIS — R29.3 POOR POSTURE: ICD-10-CM

## 2020-10-26 LAB
ALBUMIN SERPL BCP-MCNC: 3.7 G/DL (ref 3.5–5.2)
ALP SERPL-CCNC: 117 U/L (ref 55–135)
ALT SERPL W/O P-5'-P-CCNC: 31 U/L (ref 10–44)
AST SERPL-CCNC: 19 U/L (ref 10–40)
BILIRUB DIRECT SERPL-MCNC: 0.1 MG/DL (ref 0.1–0.3)
BILIRUB SERPL-MCNC: 0.3 MG/DL (ref 0.1–1)
ESTIMATED AVG GLUCOSE: 180 MG/DL (ref 68–131)
HBA1C MFR BLD HPLC: 7.9 % (ref 4–5.6)
PROT SERPL-MCNC: 7.5 G/DL (ref 6–8.4)

## 2020-10-26 PROCEDURE — 80076 HEPATIC FUNCTION PANEL: CPT

## 2020-10-26 PROCEDURE — 83036 HEMOGLOBIN GLYCOSYLATED A1C: CPT

## 2020-10-26 PROCEDURE — 97110 THERAPEUTIC EXERCISES: CPT

## 2020-10-26 PROCEDURE — 36415 COLL VENOUS BLD VENIPUNCTURE: CPT

## 2020-10-26 RX ORDER — AMLODIPINE BESYLATE 10 MG/1
10 TABLET ORAL NIGHTLY
Qty: 30 TABLET | Refills: 11 | Status: CANCELLED | OUTPATIENT
Start: 2020-10-26 | End: 2021-10-26

## 2020-10-26 RX ORDER — AMLODIPINE BESYLATE 10 MG/1
10 TABLET ORAL NIGHTLY
Qty: 30 TABLET | Refills: 11 | Status: SHIPPED | OUTPATIENT
Start: 2020-10-26 | End: 2020-12-14

## 2020-10-26 NOTE — PROGRESS NOTES
Physical Therapy Progress Note     Name: Yolanda Betts  Clinic Number: [id number]    Therapy Diagnosis:   Encounter Diagnoses   Name Primary?    Proximal muscle weakness     Poor posture     Decreased right shoulder range of motion      Physician: Maikel Redmond MD    Visit Date: 10/26/2020    Physician Orders: PT Eval and Treat  Medical Diagnosis from Referral: R shoulder pain  Evaluation Date: 8/31/2020  Authorization Period Expiration: 12/31/2020  Plan of Care Expiration: 11/23/2020  Visit # / Visits authorized: 10 (9 of 20)      Precautions: Standard, Diabetes and Bipolar disored, schizoaffective disorder    Time In: 11:00 am  Time Out: 11:45 am  Total Billable Time: 45 minutes    SUBJECTIVE     Pt reports: her shoulder has been feeling great, states she is having no shoulder pain today.    Patient was compliant with home exercise program   Response to previous treatment: sore following last session   Functional change: progression of exercises tolerated well with reports of increased fatigue     Pre-Treatment Pain: 0/10  Post-Treatment Pain: 0/10  Location: R shoulder and scapular       OBJECTIVE         RANGE OF MOTION:     Cervical Right   (spine)  10/26/2020 Left      10/26/2020 Pain/Dysfunction with Movement Goal   Cervical Flexion (60) 40 --- B posterior neck and shoulders     Cervical Extension (90) 35 --- Posterior neck      Cervical Side Bending (45) 32 26 Pain across B shoulders      Cervical Rotation (75) 60 55 Increased pain in B shoulders with movement to L               Shoulder AROM Right  10/26/2020 Left  10/26/2020 Pain/Dysfunction with Movement Goal   Shoulder Flexion (180) 170 170       Shoulder Extension (60) 40 40       Shoulder Abduction (180) 165 165       Shoulder ER (90) 90 90       Shoulder IR (70) 70 70                   Passive shoulder range of motion not tolerated B due to fear of pain and muscle guarding         STRENGTH:     U/E MMT Right  10/26/2020 Left  10/26/2020  Pain/Dysfunction with Movement Goal   Shoulder Flexion 4-/5 4/5   5/5 B   Shoulder Extension 4-/5 4/5   5/5 B   Shoulder Abduction 4-/5 4/5 pain at R superior/lateral shoulder 5/5 B   Shoulder IR 4/5 4/5 pain at R superior/lateral shoulder 5/5 B   Shoulder ER    4-/5 4-/5 pain at R superior/lateral shoulder 5/5 B   Serratus Anterior 4-/5 4/5   5/5 B   Middle Trapezius    3+/5 4-/5   5/5 B   Lower Trapezius 3+/5 4-/5   5/5 B   Elbow Flexion  4/5 4/5   5/5 B   Elbow Extension 4/5 4/5   5/5 B         MUSCLE LENGTH:      Muscle Tested  Right  10/26/2020 Left   10/26/2020 Goal   Upper Trapezius  decreased decreased Normal B    Levator Scapulae  decreased decreased Normal B   Sternocleidomastoid decreased decreased Normal B   Pectoralis Minor  decreased decreased Normal B   Pectoralis Major decreased decreased Normal B         Joint mobility and special tests not performed due to muscle guarding and inability to obtain test positions due to pain        Palpation: Increased tone and tenderness noted with palpation of right upper trapezius, levator scapulae , biceps brachii, deltoid, supraspinatus , infraspinatus , teres major and minor and subscapularis.       Posture:  Pt presents with postural abnormalities which include: forward head, rounded shoulders , increased thoracic kyphosis  and increased lumbar lordosis     Movement Analysis: increased shoulder hiking, upper trapezius activation, and contralateral trunk lean noted with B shoulder flexion and abduction      TREATMENT     Yolanda received therapeutic exercises to develop strength, ROM, posture and core stabilization for 35 minutes including: (exercises performed today listed in bold)    Exercise    Rope and pulley  3 minutes each  Flexion and abduction   Upper trapezius stretch     Manual resisted elbow flexion and extension AROM     Shoulder flexion table slides     R shoulder PROM  Flexion, abduction, IR and ER, scaption, adduction  5 minutes    6 way shoulder  isometrics  2 x 10, 2 sec hold each on R      Bicep Curls  3#, 3 x 10, seated   Shoulder flexion with dowel 3# dowel, 3 x 10        Shoulder external rotation AAROM  2#, 2 x 10 on R   Seated scapular retractions Yellow band, 3 x 10    Side lying shoulder external rotation  2 x 10 on R    Supine serratus punches  3# dowel, 3 x 10        Yolanda received the following manual therapy techniques: Joint mobilizations and Soft tissue Mobilization were applied to the: R shoulder for 10 minutes, including:  STM of right biceps brachii, deltoid, supraspinatus  and infraspinatus       Moist heat pack applied to R should at end of session for 0 minutes    Home Exercises Provided and Patient Education Provided     Education/Self-Care provided:    Patient educated on biomechanical justification for therapeutic exercise and importance of compliance with HEP in order to improve overall impairments and QOL    Patient educated on the importance of improved core and upper extremity strength in order to improve alignment of the spine and upper extremities with static positions and dynamic movement.    Educated on needing to complete shoulder rehab before addressing low back referral     Written Home Exercises Provided: yes.  Exercises were reviewed and Yolanda was able to demonstrate them prior to the end of the session.  Yolanda demonstrated good  understanding of the education provided.     See EMR under Patient Instructions for exercises provided 9/21/2020.    ASSESSMENT   Patient tolerated manual therapy well with reports of minimal discomfort during intervention compared to previous sessions. Full shoulder PROM obtained with reports of a stretch but no pain at end range. All exercises tolerated well with reports of significant fatigue in shoulder musculature but no pain or increased discomfort during interventions. Patient left session with reports of feeling good, states she has no pain.  She demonstrates significant improvements in  both shoulder range of motion and strength since starting physical therapy. She reports improved function with the shoulder based on self-reported FOTO scores. Patient will continue to benefit from skilled physical therapy in order to further address goals, maximize function and quality of life.     Yolanda is progressing well towards her goals.   Pt prognosis is Fair.     Pt will continue to benefit from skilled outpatient physical therapy to address the deficits listed in the problem list box on initial evaluation, provide pt/family education and to maximize pt's level of independence in the home and community environment.     Pt's spiritual, cultural and educational needs considered and pt agreeable to plan of care and goals.     Anticipated Barriers for therapy: co-morbidities, sedentary lifestyle, chronicity of condition, lack of understanding of condition and adherence to treatment plan    GOALS:     Short Term Goals:  6 weeks     1. Pain: Pt will demonstrate improved pain by reports of less than or equal to 7/10 worst pain on the verbal rating scale in order to progress toward maximal functional ability and improve QOL.     2. Function: Patient will demonstrate improved function as indicated by a functional limitation score of less than or equal to 58 out of 100 on FOTO.     3. Mobility: Patient will improve AROM to 50% of stated goals, listed in objective measures above, in order to progress towards independence with functional activities.      4. Strength: Patient will improve strength to 50% of stated goals, listed in objective measures above, in order to progress towards independence with functional activities.      5. HEP: Patient will demonstrate independence with HEP in order to progress toward functional independence.        Long Term Goals:  12 weeks     1. Pain: Pt will demonstrate improved pain by reports of less than or equal to 5/10 worst pain on the verbal rating scale in order to progress toward  maximal functional ability and improve QOL.       2. Function: Patient will demonstrate improved function as indicated by a functional limitation score of less than or equal to 48 out of 100 on FOTO.     3. Mobility: Patient will improve AROM to stated goals, listed in objective measures above, in order to return to maximal functional potential and improve quality of life.     4. Strength: Patient will improve strength to stated goals, listed in objective measures above, in order to improve functional independence and quality of life.     5. Patient will return to normal ADL's, IADL's, community involvement, recreational activities, and work-related activities with less than or equal to 5/10 pain and maximal function.             PLAN   Continue Plan of Care (POC) and progress per patient tolerance.    Evaluation: 8/23/2020  Progress Note: 9/28/2020  Progress Note: 10/26/2020   POC Expiration: 11/23/2020     Kathy Sanders PT
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
## 2020-10-27 ENCOUNTER — OFFICE VISIT (OUTPATIENT)
Dept: PULMONOLOGY | Facility: CLINIC | Age: 50
End: 2020-10-27
Payer: MEDICAID

## 2020-10-27 ENCOUNTER — OFFICE VISIT (OUTPATIENT)
Dept: GASTROENTEROLOGY | Facility: CLINIC | Age: 50
End: 2020-10-27
Payer: MEDICAID

## 2020-10-27 ENCOUNTER — CLINICAL SUPPORT (OUTPATIENT)
Dept: PULMONOLOGY | Facility: CLINIC | Age: 50
End: 2020-10-27
Payer: MEDICAID

## 2020-10-27 VITALS
SYSTOLIC BLOOD PRESSURE: 110 MMHG | HEART RATE: 93 BPM | WEIGHT: 210.75 LBS | HEIGHT: 56 IN | DIASTOLIC BLOOD PRESSURE: 68 MMHG | BODY MASS INDEX: 47.41 KG/M2

## 2020-10-27 VITALS
DIASTOLIC BLOOD PRESSURE: 76 MMHG | SYSTOLIC BLOOD PRESSURE: 104 MMHG | HEIGHT: 56 IN | BODY MASS INDEX: 48.6 KG/M2 | RESPIRATION RATE: 18 BRPM | WEIGHT: 216.06 LBS | OXYGEN SATURATION: 96 % | HEART RATE: 88 BPM

## 2020-10-27 DIAGNOSIS — G47.10 HYPERSOMNOLENCE DISORDER, PERSISTENT, SEVERE: ICD-10-CM

## 2020-10-27 DIAGNOSIS — E11.59 HYPERTENSION COMPLICATING DIABETES: Chronic | ICD-10-CM

## 2020-10-27 DIAGNOSIS — R94.2 DIFFUSION CAPACITY OF LUNG (DL), DECREASED: Chronic | ICD-10-CM

## 2020-10-27 DIAGNOSIS — R10.9 ABDOMINAL PAIN, UNSPECIFIED ABDOMINAL LOCATION: ICD-10-CM

## 2020-10-27 DIAGNOSIS — E11.65 TYPE 2 DIABETES MELLITUS WITH HYPERGLYCEMIA, WITH LONG-TERM CURRENT USE OF INSULIN: Chronic | ICD-10-CM

## 2020-10-27 DIAGNOSIS — E66.2 OBESITY HYPOVENTILATION SYNDROME: ICD-10-CM

## 2020-10-27 DIAGNOSIS — R14.0 BLOATING: ICD-10-CM

## 2020-10-27 DIAGNOSIS — G47.36 NOCTURNAL HYPOXEMIA DUE TO OBESITY: Chronic | ICD-10-CM

## 2020-10-27 DIAGNOSIS — E66.01 MORBID OBESITY WITH BMI OF 45.0-49.9, ADULT: Chronic | ICD-10-CM

## 2020-10-27 DIAGNOSIS — J45.30 MILD PERSISTENT ASTHMA WITHOUT COMPLICATION: Chronic | ICD-10-CM

## 2020-10-27 DIAGNOSIS — G47.8 NOCTURNAL SLEEP-RELATED EATING DISORDER: ICD-10-CM

## 2020-10-27 DIAGNOSIS — G47.20 CIRCADIAN RHYTHM DISORDER: ICD-10-CM

## 2020-10-27 DIAGNOSIS — E66.9 NOCTURNAL HYPOXEMIA DUE TO OBESITY: Chronic | ICD-10-CM

## 2020-10-27 DIAGNOSIS — K59.00 CONSTIPATION, UNSPECIFIED CONSTIPATION TYPE: Primary | ICD-10-CM

## 2020-10-27 DIAGNOSIS — R94.2 RESTRICTIVE VENTILATORY DEFECT: ICD-10-CM

## 2020-10-27 DIAGNOSIS — E78.1 HYPERTRIGLYCERIDEMIA: Chronic | ICD-10-CM

## 2020-10-27 DIAGNOSIS — Z79.4 TYPE 2 DIABETES MELLITUS WITH HYPERGLYCEMIA, WITH LONG-TERM CURRENT USE OF INSULIN: Chronic | ICD-10-CM

## 2020-10-27 DIAGNOSIS — J45.40 MODERATE PERSISTENT ASTHMA WITHOUT COMPLICATION: Primary | ICD-10-CM

## 2020-10-27 DIAGNOSIS — E11.69 DYSLIPIDEMIA ASSOCIATED WITH TYPE 2 DIABETES MELLITUS: Chronic | ICD-10-CM

## 2020-10-27 DIAGNOSIS — I15.2 HYPERTENSION COMPLICATING DIABETES: Chronic | ICD-10-CM

## 2020-10-27 DIAGNOSIS — E78.5 DYSLIPIDEMIA ASSOCIATED WITH TYPE 2 DIABETES MELLITUS: Chronic | ICD-10-CM

## 2020-10-27 LAB
BRPFT: NORMAL
FEF 25 75 LLN: 1.01
FEF 25 75 PRE REF: 68.1 %
FEF 25 75 REF: 2.05
FEV1 FVC LLN: 71
FEV1 FVC PRE REF: 98.4 %
FEV1 FVC REF: 82
FEV1 LLN: 1.38
FEV1 PRE REF: 67.4 %
FEV1 REF: 1.82
FVC LLN: 1.69
FVC PRE REF: 68.5 %
FVC REF: 2.2
PEF LLN: 3.52
PEF PRE REF: 48.6 %
PEF REF: 5.05
PRE FEF 25 75: 1.39 L/S
PRE FET 100: 7.19 SEC
PRE FEV1 FVC: 81.05 %
PRE FEV1: 1.22 L
PRE FVC: 1.51 L
PRE PEF: 2.45 L/S

## 2020-10-27 PROCEDURE — 99999 PR PBB SHADOW E&M-EST. PATIENT-LVL V: ICD-10-PCS | Mod: PBBFAC,,, | Performed by: PHYSICIAN ASSISTANT

## 2020-10-27 PROCEDURE — 99214 OFFICE O/P EST MOD 30 MIN: CPT | Mod: 25,S$PBB,, | Performed by: INTERNAL MEDICINE

## 2020-10-27 PROCEDURE — 99999 PR PBB SHADOW E&M-EST. PATIENT-LVL V: ICD-10-PCS | Mod: PBBFAC,,, | Performed by: INTERNAL MEDICINE

## 2020-10-27 PROCEDURE — 99215 OFFICE O/P EST HI 40 MIN: CPT | Mod: PBBFAC,25 | Performed by: PHYSICIAN ASSISTANT

## 2020-10-27 PROCEDURE — 99999 PR PBB SHADOW E&M-EST. PATIENT-LVL V: CPT | Mod: PBBFAC,,, | Performed by: INTERNAL MEDICINE

## 2020-10-27 PROCEDURE — 99215 OFFICE O/P EST HI 40 MIN: CPT | Mod: PBBFAC,27,25 | Performed by: INTERNAL MEDICINE

## 2020-10-27 PROCEDURE — 99214 OFFICE O/P EST MOD 30 MIN: CPT | Mod: S$PBB,,, | Performed by: PHYSICIAN ASSISTANT

## 2020-10-27 PROCEDURE — 94010 BREATHING CAPACITY TEST: CPT | Mod: PBBFAC

## 2020-10-27 PROCEDURE — 99214 PR OFFICE/OUTPT VISIT, EST, LEVL IV, 30-39 MIN: ICD-10-PCS | Mod: 25,S$PBB,, | Performed by: INTERNAL MEDICINE

## 2020-10-27 PROCEDURE — 94010 BREATHING CAPACITY TEST: ICD-10-PCS | Mod: 26,S$PBB,, | Performed by: INTERNAL MEDICINE

## 2020-10-27 PROCEDURE — 99214 PR OFFICE/OUTPT VISIT, EST, LEVL IV, 30-39 MIN: ICD-10-PCS | Mod: S$PBB,,, | Performed by: PHYSICIAN ASSISTANT

## 2020-10-27 PROCEDURE — 94010 BREATHING CAPACITY TEST: CPT | Mod: 26,S$PBB,, | Performed by: INTERNAL MEDICINE

## 2020-10-27 PROCEDURE — 99999 PR PBB SHADOW E&M-EST. PATIENT-LVL V: CPT | Mod: PBBFAC,,, | Performed by: PHYSICIAN ASSISTANT

## 2020-10-27 RX ORDER — BUDESONIDE AND FORMOTEROL FUMARATE DIHYDRATE 160; 4.5 UG/1; UG/1
2 AEROSOL RESPIRATORY (INHALATION) EVERY 12 HOURS
Qty: 10.2 G | Refills: 11 | Status: SHIPPED | OUTPATIENT
Start: 2020-10-27 | End: 2021-08-03 | Stop reason: SDUPTHER

## 2020-10-27 RX ORDER — LUBIPROSTONE 24 UG/1
24 CAPSULE ORAL 2 TIMES DAILY
Qty: 60 CAPSULE | Refills: 2 | Status: SHIPPED | OUTPATIENT
Start: 2020-10-27 | End: 2021-02-09 | Stop reason: SDUPTHER

## 2020-10-27 RX ORDER — SODIUM, POTASSIUM,MAG SULFATES 17.5-3.13G
1 SOLUTION, RECONSTITUTED, ORAL ORAL DAILY
Qty: 354 ML | Refills: 0 | Status: SHIPPED | OUTPATIENT
Start: 2020-10-27 | End: 2020-10-29

## 2020-10-27 NOTE — ASSESSMENT & PLAN NOTE
Poor sleep routine  Bed time 0830 pm, doesn't fall asleep until 11 pm, awake 2-4am : eat at night, on tablet face book, get out off bed: 6am    Delay bed time to 11 30 pm  Wake time 7 AM

## 2020-10-27 NOTE — ASSESSMENT & PLAN NOTE
Would consider Provigil or Nuvigil  Need to improve hygeine  No mission critical day time duties  Doesn't drive

## 2020-10-27 NOTE — LETTER
October 27, 2020      Subhash Bailey MD  [address]  [place] [address]           [facility]  [address] [place] [id number]  Phone: [phone]  Fax: [phone]          Patient: Yolanda Betts   MR Number: [id number]   YOB: 1970   Date of Visit: 10/27/2020       Dear Dr. Subhash Bailey:    Thank you for referring Yolanda Betts to me for evaluation. Attached you will find relevant portions of my assessment and plan of care.    If you have questions, please do not hesitate to call me. I look forward to following Yolanda Betts along with you.    Sincerely,    Sylvain Rainey MD    Enclosure  CC:  No Recipients    If you would like to receive this communication electronically, please contact [email or web address] or [phone] to request more information on [employer] access.    For providers and/or their staff who would like to refer a patient to [facility], please contact us through our one-stop-shop provider referral line, [facility] , at [phone].    If you feel you have received this communication in error or would no longer like to receive these types of communications, please e-mail [email or web address]

## 2020-10-27 NOTE — PROGRESS NOTES
Subjective:      Patient ID: Yolanda Betts is a 50 y.o. female.    Chief Complaint: Constipation and Heartburn    HPI:  Patient reports today for follow up of constipation and nausea. She was started on Linzess 145 after a bottle of magnesium citrate. Mag citrate resulted in numerous bowel movements but the Linzess was not working well. I increased dose to 145mcg BID in addition to Miralax. She reports today stating that she has had 1 bowel movement in the past week. It was a larger bowel movement. She continues with abdominal discomfort and severe bloating in addition to nausea and heartburn. Patient has tried OTC treatments including Miralax, Colace, laxatives, enemas and suppositories. She has also failed therapy with Lactulose and Linzess. Last colonoscopy in 2018.    Review of Systems   Constitutional: Negative for activity change, appetite change, chills, diaphoresis, fatigue, fever and unexpected weight change.   HENT: Negative for trouble swallowing.    Respiratory: Negative for cough and shortness of breath.    Cardiovascular: Negative for chest pain.   Gastrointestinal: Positive for abdominal distention, abdominal pain, constipation and nausea. Negative for blood in stool, diarrhea and vomiting.   Neurological: Negative for dizziness, weakness, light-headedness and numbness.   Psychiatric/Behavioral: Negative for dysphoric mood. The patient is not nervous/anxious.        Medical History: Reviewed    Social History: Reviewed    Allergies: Reviewed    Objective:     Physical Exam  Constitutional:       General: She is not in acute distress.     Appearance: Normal appearance. She is obese. She is not ill-appearing, toxic-appearing or diaphoretic.   HENT:      Head: Normocephalic and atraumatic.   Eyes:      Extraocular Movements: Extraocular movements intact.   Neck:      Musculoskeletal: Normal range of motion.   Cardiovascular:      Rate and Rhythm: Normal rate and regular rhythm.   Pulmonary:       Effort: Pulmonary effort is normal. No respiratory distress.      Breath sounds: Normal breath sounds. No wheezing.   Abdominal:      General: Bowel sounds are normal. There is distension.      Palpations: There is no mass.      Tenderness: There is abdominal tenderness (generalized). There is no guarding or rebound.   Musculoskeletal: Normal range of motion.      Right lower leg: No edema.      Left lower leg: No edema.   Skin:     General: Skin is warm and dry.      Coloration: Skin is not pale.      Findings: No erythema.   Neurological:      General: No focal deficit present.      Mental Status: She is alert and oriented to person, place, and time.   Psychiatric:         Mood and Affect: Mood normal.         Behavior: Behavior normal.         Assessment:     1. Constipation, unspecified constipation type    2. Abdominal pain, unspecified abdominal location    3. Bloating        Plan:     -Will try to clear bowels with a prep followed by Amitiza, as Linzess nor Lactulose have been beneficial.  -If this is not helpful, will order sitz markers.  -May eventually need repeat colonoscopy.  -Heartburn and nausea likely associated with severe constipation. GERD diet.    Yolanda was seen today for constipation and heartburn.    Diagnoses and all orders for this visit:    Constipation, unspecified constipation type  -     sodium,potassium,mag sulfates (SUPREP BOWEL PREP KIT) 17.5-3.13-1.6 gram SolR; Take 177 mLs by mouth once daily. for 2 days  -     lubiprostone (AMITIZA) 24 MCG Cap; Take 1 capsule (24 mcg total) by mouth 2 (two) times daily.    Abdominal pain, unspecified abdominal location  -     sodium,potassium,mag sulfates (SUPREP BOWEL PREP KIT) 17.5-3.13-1.6 gram SolR; Take 177 mLs by mouth once daily. for 2 days  -     lubiprostone (AMITIZA) 24 MCG Cap; Take 1 capsule (24 mcg total) by mouth 2 (two) times daily.    Bloating  -     sodium,potassium,mag sulfates (SUPREP BOWEL PREP KIT) 17.5-3.13-1.6 gram SolR; Take  177 mLs by mouth once daily. for 2 days  -     lubiprostone (AMITIZA) 24 MCG Cap; Take 1 capsule (24 mcg total) by mouth 2 (two) times daily.        No follow-ups on file.    Thank you for the opportunity to participate in the care of this patient.   Mojgan Franklin PA-C.
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
## 2020-10-27 NOTE — PATIENT INSTRUCTIONS
"AutoMEQ  Automated Morningness-Eveningness Questionnaire (AutoMEQ)  HERE IS YOUR PERSONALIZED AUTO-MEQ FEEDBACK    Your score is 55.    YOUR MORNINGNESS-EVENINGNESS TYPE IS CONSIDERED TO BE INTERMEDIATE.  Morningness-eveningness scores range from 16-86. Scores of 41 and below indicate "evening types." Scores of 59 and above indicate "morning types." Scores between 42-58 indicate "intermediate types."    16-30 31-41 42-58 59-69 70-86  definite evening moderate evening intermediate moderate morning definite morning       Your score allows us to estimate when your brain begins to produce the nighttime hormone melatonin, which normally occurs about two to three hours before you are ready to fall asleep.    WE ESTIMATE THAT YOUR MELATONIN ONSET OCCURS AT ABOUT 9:15 pm.    The time you are first able to fall asleep easily (assuming that you keep a regular sleep schedule) is related to the time that your brain begins to produce melatonin in the evening or at night. This secretion can be measured in your saliva, where it has a concentration of 3 picograms per milliliter.    WE ESTIMATE THAT YOUR "NATURAL" BEDTIME IS AT ABOUT 11:30 pm.    This information can be put to important use if you are trying to organize your daily schedule to best match with your circadian rhythm type. It is especially useful if you want to use light therapy to shift your rhythms in a desired direction (morning light shifts rhythms earlier; evening light shifts them later).    Appropriate timing of therapy can help you:    wake up more alert for a normal work day,  reduce insomnia when you are trying to get to sleep,  accommodate to shift work, prepare for (or recover from) long distance air travel,  correct abnormal sleep patterns such as the Delayed Sleep Phase Syndrome.  Below, we specify two well-tested applications:    Light therapy is the first-line treatment for Seasonal Affective Disorder, eliminating or reducing the need to take drugs. " People suffering with major depressive episodes in winter should seek supervision of treatment by a health professional knowledgeable about managing the symptoms. The personalized advice we give you here is based on a large clinical trial at [facility]. That clinical trial used a 10,000 lux fluorescent light box with an overhead diffusing screen, for 30 minutes daily. (Other light box types might require longer exposure duration, or might be less effective. You should ask your doctor to help you make adjustments to maximize the antidepressant effect.)    If you are considering 10,000 lux light therapy to combat winter depression, your morningness-eveningness score indicates that YOUR OPTIMUM 30-MINUTE LIGHT TREATMENT SHOULD BEGIN AT 6:15 am.    You can try the same light timing, 6:15 am, to combat oversleeping and ease of rising in morning, whether or not you have winter depression.        Our recommendation provides only a general guideline. You may need to adjust the timing based on your experience during treatment. If you sleep longer than 7 hours per night, this schedule will require you to wake up earlier than usual for greatest benefit. Some people compensate by going to bed earlier, while others feel fine with shorter sleep.

## 2020-10-28 ENCOUNTER — OFFICE VISIT (OUTPATIENT)
Dept: SPORTS MEDICINE | Facility: CLINIC | Age: 50
End: 2020-10-28
Payer: MEDICAID

## 2020-10-28 ENCOUNTER — TELEPHONE (OUTPATIENT)
Dept: PULMONOLOGY | Facility: CLINIC | Age: 50
End: 2020-10-28

## 2020-10-28 ENCOUNTER — TELEPHONE (OUTPATIENT)
Dept: PAIN MEDICINE | Facility: CLINIC | Age: 50
End: 2020-10-28

## 2020-10-28 VITALS
HEIGHT: 56 IN | WEIGHT: 210.75 LBS | DIASTOLIC BLOOD PRESSURE: 70 MMHG | SYSTOLIC BLOOD PRESSURE: 110 MMHG | HEART RATE: 92 BPM | BODY MASS INDEX: 47.41 KG/M2

## 2020-10-28 DIAGNOSIS — M79.7 FIBROMYALGIA: Chronic | ICD-10-CM

## 2020-10-28 DIAGNOSIS — M77.8 RIGHT SHOULDER TENDONITIS: Chronic | ICD-10-CM

## 2020-10-28 DIAGNOSIS — G56.03 BILATERAL CARPAL TUNNEL SYNDROME: ICD-10-CM

## 2020-10-28 DIAGNOSIS — E11.42 DIABETIC POLYNEUROPATHY ASSOCIATED WITH TYPE 2 DIABETES MELLITUS: Primary | Chronic | ICD-10-CM

## 2020-10-28 PROCEDURE — 99215 PR OFFICE/OUTPT VISIT, EST, LEVL V, 40-54 MIN: ICD-10-PCS | Mod: S$PBB,,, | Performed by: FAMILY MEDICINE

## 2020-10-28 PROCEDURE — 99215 OFFICE O/P EST HI 40 MIN: CPT | Mod: S$PBB,,, | Performed by: FAMILY MEDICINE

## 2020-10-28 PROCEDURE — 99999 PR PBB SHADOW E&M-EST. PATIENT-LVL III: ICD-10-PCS | Mod: PBBFAC,,, | Performed by: FAMILY MEDICINE

## 2020-10-28 PROCEDURE — 99999 PR PBB SHADOW E&M-EST. PATIENT-LVL III: CPT | Mod: PBBFAC,,, | Performed by: FAMILY MEDICINE

## 2020-10-28 PROCEDURE — 99213 OFFICE O/P EST LOW 20 MIN: CPT | Mod: PBBFAC,PN | Performed by: FAMILY MEDICINE

## 2020-10-28 NOTE — TELEPHONE ENCOUNTER
Informed pt that /Staff are out of the office until Monday 11/2/2020 but I will let NURSE/ know. Pt was able to verbalize all understanding. All questions answered.//lp

## 2020-10-28 NOTE — TELEPHONE ENCOUNTER
----- Message from Tamara Singletary sent at 10/28/2020 10:56 AM CDT -----  Contact: pt  Pt is calling to have an EMG scheduled per Dr Redmond and can be reached at [phone]//[email or web address]

## 2020-10-28 NOTE — PROGRESS NOTES
"Subjective:     Patient ID: Yolanda Betts is a 50 y.o. female.    Chief Complaint: Pain of the Right Wrist, Pain of the Left Wrist, and Pain of the Right Shoulder      HPI: patient states she has had bilateral wrist pain and tingling along with right shoulder pains for least fiber 6 years and due to her fibromyalgia if she has constant pain "all over" of 10/10  Problem -  pain  Duration -  Several years    Severity -    Pain -  10/10   Limitations -  Affects ADLs and sometimes sleep    Previous Treatment -  Currently in pain management and also treated in rheumatology    Other Information -  Was not contacted for nerve conduction yet so reschedule this  And referred to hand surgery.  Also refer to shoulder surgery for longstanding partial tears of the right rotator cuff.    The patient feels that she is making slight improvement in her range of motion of the right shoulder now that she is in physical therapy but her pain is just as bad and she still cannot lift her right arm above chest height.      She thinks wearing the bilateral wrist splints has slightly decreased her tingling and hand   Pain.      Past Medical History:   Diagnosis Date    Abnormal Pap smear of cervix     HPV genital warts    Anemia     Anxiety     Arthritis     Asthma 10/11/2016    Bipolar 1 disorder     Diabetes mellitus, type 2     Dyslipidemia associated with type 2 diabetes mellitus 5/13/2019    Genital warts     GERD (gastroesophageal reflux disease)     Herpes simplex virus (HSV) infection     Hyperlipidemia     Hypertension     Hypertension complicating diabetes 5/5/2019    Migraine with aura and without status migrainosus, not intractable 3/21/2016    Mild persistent asthma without complication 10/11/2016    Morbid obesity with body mass index (BMI) of 45.0 to 49.9 in adult 8/3/2017    Obstructive sleep apnea     ALEN (obstructive sleep apnea)     2L per N/C q HS    Schizoaffective disorder, bipolar type " "2019    Seasonal allergic rhinitis due to pollen 2019    Type 2 diabetes mellitus with hyperglycemia, with long-term current use of insulin 2017    Type 2 diabetes mellitus with hyperglycemia, without long-term current use of insulin 2017     Past Surgical History:   Procedure Laterality Date    abcess removed right back      BELT ABDOMINOPLASTY      BREAST SURGERY Bilateral 1996    Reduction     SECTION      X 2    COLONOSCOPY      COLONOSCOPY N/A 2018    Procedure: colonoscopy for iron deficiency anemia;  Surgeon: Frankie Sanchez MD;  Location: [facility] ENDO;  Service: Endoscopy;  Laterality: N/A;    COLONOSCOPY N/A 2018    Procedure: COLONOSCOPY;  Surgeon: Frankie Sanchez MD;  Location: [facility] ENDO;  Service: Endoscopy;  Laterality: N/A;    HYSTERECTOMY      w/ BSO; hypermenorrhea    MOUTH SURGERY      OOPHORECTOMY      hyst/bso, hypermenorrhea    ROBOT-ASSISTED CHOLECYSTECTOMY USING DA DARI XI N/A 1/3/2020    Procedure: XI ROBOTIC CHOLECYSTECTOMY;  Surgeon: Damir Driscoll MD;  Location: [facility] OR;  Service: General;  Laterality: N/A;    TOTAL REDUCTION MAMMOPLASTY      TUBAL LIGATION       Family History   Problem Relation Age of Onset    Diabetes Mother     Hyperlipidemia Mother     Hypertension Mother     Asthma Mother     COPD Mother     Glaucoma Mother     Thyroid disease Mother     Anesthesia problems Mother         "almost had a cardiac arrest" , blood clots    Hypertension Father     Hyperlipidemia Father     Cancer Father         Brain, lung, liver, kidney    Heart disease Maternal Grandmother     Hyperlipidemia Maternal Grandmother     Hypertension Maternal Grandmother     Cataracts Maternal Grandmother     Diabetes Maternal Grandmother     Heart disease Maternal Grandfather     Hyperlipidemia Maternal Grandfather     Hypertension Maternal Grandfather     Glaucoma Maternal Grandfather     Cancer Maternal " Grandfather     Cataracts Maternal Grandfather     Macular degeneration Maternal Grandfather     Diabetes Maternal Grandfather     Heart disease Paternal Grandmother     Hyperlipidemia Paternal Grandmother     Hypertension Paternal Grandmother     Cataracts Paternal Grandmother     Heart disease Paternal Grandfather     Hyperlipidemia Paternal Grandfather     Hypertension Paternal Grandfather     Cataracts Paternal Grandfather     Breast cancer Maternal Cousin     Breast cancer Maternal Cousin     Breast cancer Maternal Cousin     Breast cancer Maternal Cousin      Social History     Socioeconomic History    Marital status: Single     Spouse name: Not on file    Number of children: Not on file    Years of education: Not on file    Highest education level: Not on file   Occupational History    Not on file   Social Needs    Financial resource strain: Not on file    Food insecurity     Worry: Not on file     Inability: Not on file    Transportation needs     Medical: Not on file     Non-medical: Not on file   Tobacco Use    Smoking status: Never Smoker    Smokeless tobacco: Never Used   Substance and Sexual Activity    Alcohol use: Yes     Alcohol/week: 0.0 standard drinks     Comment: socially  No alcohol 72h prior to sx    Drug use: No    Sexual activity: Yes     Partners: Male   Lifestyle    Physical activity     Days per week: Not on file     Minutes per session: Not on file    Stress: Not on file   Relationships    Social connections     Talks on phone: Not on file     Gets together: Not on file     Attends [employer] service: Not on file     Active member of club or organization: Not on file     Attends meetings of clubs or organizations: Not on file     Relationship status: Not on file   Other Topics Concern    Not on file   Social History Narrative    Long-term care nurse       Current Outpatient Medications:     albuterol (PROVENTIL) 2.5 mg /3 mL (0.083 %) nebulizer solution,  "Take 3 mLs (2.5 mg total) by nebulization every 4 (four) hours., Disp: 180 mL, Rfl: 11    amLODIPine (NORVASC) 10 MG tablet, Take 1 tablet (10 mg total) by mouth every evening., Disp: 90 tablet, Rfl: 3    amLODIPine (NORVASC) 10 MG tablet, Take 1 tablet (10 mg total) by mouth every evening., Disp: 30 tablet, Rfl: 11    amLODIPine (NORVASC) 5 MG tablet, Take 1 tablet (5 mg total) by mouth every evening., Disp: 30 tablet, Rfl: 11    ammonium lactate 12 % Crea, Apply 1 Application topically 2 (two) times daily., Disp: 140 g, Rfl: 3    ARIPiprazole (ABILIFY) 10 MG Tab, Take 1 tablet (10 mg total) by mouth once daily., Disp: 30 tablet, Rfl: 2    aspirin (ECOTRIN) 81 MG EC tablet, Take 81 mg by mouth once daily., Disp: , Rfl:     atorvastatin (LIPITOR) 20 MG tablet, Take 1 tablet (20 mg total) by mouth every evening., Disp: 90 tablet, Rfl: 0    BD INSULIN SYRINGE ULTRA-FINE 1/2 mL 30 gauge x 1/2" Syrg, , Disp: , Rfl: 0    benztropine (COGENTIN) 1 MG tablet, Take 1 tablet (1 mg total) by mouth every evening., Disp: 30 tablet, Rfl: 1    blood sugar diagnostic Strp, Check blood glucose 4 times daily as directed and as needed (dispense insurance preferred brand or patient choice), Disp: 200 each, Rfl: 5    blood-glucose meter Misc, Use to check blood sugars, give meter based on insurance specification, Disp: 1 each, Rfl: 1    budesonide-formoterol 160-4.5 mcg (SYMBICORT) 160-4.5 mcg/actuation HF, Inhale 2 puffs into the lungs every 12 (twelve) hours. Controller : Use spacer, Disp: 10.2 g, Rfl: 11    clonazePAM (KLONOPIN) 1 MG tablet, Take 1 tablet by mouth three times daily, Disp: 90 tablet, Rfl: 0    clotrimazole-betamethasone 1-0.05% (LOTRISONE) cream, Apply to affected area 2 times daily, Disp: 15 g, Rfl: 1    cyclobenzaprine (FLEXERIL) 10 MG tablet, Take 1 tablet (10 mg total) by mouth daily as needed (for back muscle spasms)., Disp: 30 tablet, Rfl: 11    cyclosporine 0.05 % Drop, Place 1 drop into both " eyes 2 (two) times daily., Disp: 5.5 mL, Rfl: 11    DULoxetine (CYMBALTA) 60 MG capsule, Take 1 capsule by mouth twice daily, Disp: 60 capsule, Rfl: 1    ergocalciferol (ERGOCALCIFEROL) 50,000 unit Cap, Take 1 capsule (50,000 Units total) by mouth every 14 (fourteen) days., Disp: 4 capsule, Rfl: 5    exenatide microspheres (BYDUREON) 2 mg/0.65 mL PnIj, Inject 2 mg into the skin every 7 days., Disp: 4 each, Rfl: 3    fish oil-omega-3 fatty acids 300-1,000 mg capsule, Take 1 capsule by mouth once daily., Disp: , Rfl:     frovatriptan (FROVA) 2.5 MG tablet, Take 1 tablet at onset of acute migraine. May take 1 more tablet 2 hours later if needed. (MAX 2 TABLET PER DAY. MAX 4 TABLETS PER WEEK.), Disp: 9 tablet, Rfl: 5    furosemide (LASIX) 20 MG tablet, Take 1 tablet (20 mg total) by mouth once daily., Disp: 30 tablet, Rfl: 11    glimepiride (AMARYL) 2 MG tablet, Take 2 tablets (4 mg total) by mouth before breakfast., Disp: 60 tablet, Rfl: 3    hydrALAZINE (APRESOLINE) 10 MG tablet, Take 1 tablet (10 mg total) by mouth every 12 (twelve) hours., Disp: 60 tablet, Rfl: 11    insulin aspart U-100 (NOVOLOG FLEXPEN U-100 INSULIN) 100 unit/mL (3 mL) InPn pen, Inject before meals three times a day up to 90 units daily, Disp: 15 mL, Rfl: 0    insulin aspart U-100 (NOVOLOG FLEXPEN U-100 INSULIN) 100 unit/mL (3 mL) InPn pen, Inject before meals three times a day up to 90 units daily, Disp: 30 mL, Rfl: 0    lancets 28 gauge Misc, use as directed 3 times daily, Disp: 100 each, Rfl: 11    linaCLOtide (LINZESS) 145 mcg Cap capsule, Take 1 capsule (145 mcg total) by mouth daily as needed (for constipation)., Disp: 30 capsule, Rfl: 11    lubiprostone (AMITIZA) 24 MCG Cap, Take 1 capsule (24 mcg total) by mouth 2 (two) times daily., Disp: 60 capsule, Rfl: 2    lurasidone (LATUDA) 120 mg Tab, Take 1 tablet by mouth everyday at 5 pm with food, Disp: 30 tablet, Rfl: 0    magnesium oxide (MAG-OX) 400 mg (241.3 mg magnesium)  "tablet, Take 1 tablet (400 mg total) by mouth once daily., Disp: 90 tablet, Rfl: 3    metFORMIN (GLUCOPHAGE) 1000 MG tablet, Take 1 tablet (1,000 mg total) by mouth 2 (two) times daily with meals., Disp: 180 tablet, Rfl: 4    metoprolol succinate (TOPROL-XL) 100 MG 24 hr tablet, Take 2 tablets (200 mg total) by mouth every evening., Disp: 180 tablet, Rfl: 3    metoprolol succinate (TOPROL-XL) 200 MG 24 hr tablet, Take 1 tablet by mouth once daily in the evening., Disp: 90 tablet, Rfl: 3    mirtazapine (REMERON) 45 MG tablet, Take 1 tablet by mouth every evening, Disp: 30 tablet, Rfl: 1    montelukast (SINGULAIR) 10 mg tablet, Take 1 tablet (10 mg total) by mouth every evening., Disp: 30 tablet, Rfl: 11    MULTIVIT,CALC,MINS/IRON/FOLIC (DAILY MULTIPLE FOR WOMEN ORAL), Take 1 tablet by mouth once daily., Disp: , Rfl:     pen needle, diabetic (BD ULTRA-FINE MINI PEN NEEDLE) 31 gauge x 3/16" Ndle, Use 5 a day, Disp: 200 each, Rfl: 3    pregabalin (LYRICA) 150 MG capsule, Take 1 capsule (150 mg total) by mouth 2 (two) times daily., Disp: 60 capsule, Rfl: 3    promethazine (PHENERGAN) 25 MG tablet, , Disp: , Rfl:     sodium,potassium,mag sulfates (SUPREP BOWEL PREP KIT) 17.5-3.13-1.6 gram SolR, Take 177 mLs by mouth once daily. for 2 days, Disp: 354 mL, Rfl: 0    spironolactone (ALDACTONE) 25 MG tablet, Take 1 tablet (25 mg total) by mouth once daily., Disp: 30 tablet, Rfl: 6    topiramate (TOPAMAX) 50 MG tablet, Take 1 tablet (50 mg total) by mouth 2 (two) times daily., Disp: 60 tablet, Rfl: 11    TOUJEO MAX U-300 SOLOSTAR 300 unit/mL (3 mL) InPn, Inject 96 units once daily at bedtime, Disp: 15 mL, Rfl: 3    valACYclovir (VALTREX) 1000 MG tablet, Take 1 tablet (1,000 mg total) by mouth once daily., Disp: 30 tablet, Rfl: 11    valsartan (DIOVAN) 320 MG tablet, Take 1 tablet (320 mg total) by mouth once daily., Disp: 90 tablet, Rfl: 3    zolpidem (AMBIEN) 10 mg Tab, Take 1 tablet by mouth at bedtime, Disp: " 30 tablet, Rfl: 1  Review of patient's allergies indicates:   Allergen Reactions    Codeine Itching    Ibuprofen     Mobic [meloxicam]     Neuromuscular blockers, steroidal Hives     some    Latex, natural rubber Rash    Morphine Rash     itching    Norco [hydrocodone-acetaminophen] Itching, Rash and Hallucinations    Seconal [secobarbital sodium] Rash     itching    Tylox [oxycodone-acetaminophen] Rash     Review of Systems   Constitutional: Negative for chills, fever and weight loss.   Respiratory: Negative for shortness of breath.    Cardiovascular: Negative for chest pain and palpitations.       Objective:   Body mass index is 48.11 kg/m².  Vitals:    10/28/20 0843   BP: 110/70   Pulse: 92           Ortho/SPM Exam  General - A&O, NAD.  Ambulatory, appears to be in some chronic pain    Respiratory Effort - Normal    Extremity (Body Part) -  Right shoulder     -No acute deformity/swelling    ROM -  Abduction and flexion  Limited to 70°    TTP -  Diffusely over the entire right shoulder    Stability - grossly normal    Strength -  3/5, difficulty with empty can position    Neurovascular Intact. Negative Tinel bilaterally and with Phalen test patient has increased pain into the hand but no tingling.  She notes that generally she has tingling into the thumb index and middle fingers on both hands.    Other -     Psychiatric - Affect & Cognition WNL      Plan for Improvement -       IMAGING: X-Ray Hand 3 View Bilateral  Narrative: EXAMINATION:  XR HAND COMPLETE 3 VIEWS BILATERAL    CLINICAL HISTORY:  . Pain in right wrist    TECHNIQUE:  PA, lateral, and oblique views of both hands were performed.    COMPARISON:  None    FINDINGS:  See also report from bilateral wrist series performed on same date.    Degenerative changes noted in the wrist most prominently involving the triscaphe joints.  Small area of decreased density within the right triquetrum suggesting cyst with sclerotic margins.    Mild degenerative  changes noted bilaterally in the DIP joints.  No fracture or dislocation.  No discrete erosion or periosteal reaction.  No abnormal calcification or foreign body on the right.  Tiny equivocal calcific density projects over the dorsum of the left hand level of the distal 3rd of the 4th metacarpal.  Impression: As above.  Follow-up and or further evaluation as warranted    Electronically signed by: Julius Arreola MD  Date:    09/15/2020  Time:    10:14  X-Ray Wrist Complete Bilateral  Narrative: EXAMINATION:  XR WRIST COMPLETE 3 VIEWS BILATERAL    CLINICAL HISTORY:  Pain in right wrist    TECHNIQUE:  PA, lateral, and oblique views of both wrists were performed.    COMPARISON:  May 19, 2020    FINDINGS:  Right wrist: Mild degenerative change noted in the wrist most prominent in the triscaphe joint.  No acute fracture or dislocation.  Small cyst noted in within the triquetrum with mild surrounding sclerosis.  Diffuse soft tissue prominence of the forearm, wrist and included hand.  No radiopaque foreign body.  No abnormal soft tissue calcification.    Left wrist: Similar mild degenerative changes in the wrist most prominently involving the triscaphe joint.  No fracture or dislocation.  No focal erosion or periosteal reaction.  Mild diffuse soft tissue prominence included upper extremity.  No soft tissue calcification or radiopaque foreign body.  Impression: As above.  Follow-up and or further evaluation as warranted.    Electronically signed by: Julius Arreola MD  Date:    09/15/2020  Time:    10:11       Radiographs / Imaging : Relevant imaging results reviewed by me and interpreted by me, discussed with the patient and / or family -  Radiographs reviewed by me and discussed with patient regarding the degenerative changes in the wrist and the shoulder.      Note- 45 min spent face-to-face with patient over 50% that time consultation regarding management of her overall pain and especially problems with the right shoulder  and both hands and wrist proper use of the splints, physical therapy and medications.    Assessment:     Encounter Diagnoses   Name Primary?    Diabetic polyneuropathy associated with type 2 diabetes mellitus Yes    Right shoulder tendonitis     Bilateral carpal tunnel syndrome     Fibromyalgia         Plan:   Diabetic polyneuropathy associated with type 2 diabetes mellitus    Right shoulder tendonitis    Bilateral carpal tunnel syndrome    Fibromyalgia        The patient verbalized good understanding of the medical issues discussed today and expressed appreciation for the care provided.  Patient was given the opportunity to ask questions and be an active participant in their medical care. Patient had no further questions or concerns at this time.     The patient was encouraged to participate in appropriate physical activity.      Maikel Redmond M.D.  [facility]        This note was dictated using voice recognition software and may have sound a like errors.

## 2020-10-28 NOTE — PATIENT INSTRUCTIONS
Follow-up with pain management for fibromyalgia and back pain    See Dr. Roth for carpal tunnel    See Dr. Kirkpatrick  Shoulder     continue physical therapy for now

## 2020-10-28 NOTE — TELEPHONE ENCOUNTER
Chronic Disease Management  Called patient to schedule initial Pulmonary Disease Management appointment.   Patient scheduled on 11/3/20 at 3:00 PM for a virtual visit.           Time spent on call    mins

## 2020-10-29 ENCOUNTER — OFFICE VISIT (OUTPATIENT)
Dept: DIABETES | Facility: CLINIC | Age: 50
End: 2020-10-29
Payer: MEDICAID

## 2020-10-29 ENCOUNTER — TELEPHONE (OUTPATIENT)
Dept: PULMONOLOGY | Facility: CLINIC | Age: 50
End: 2020-10-29

## 2020-10-29 ENCOUNTER — TELEPHONE (OUTPATIENT)
Dept: INTERNAL MEDICINE | Facility: CLINIC | Age: 50
End: 2020-10-29

## 2020-10-29 DIAGNOSIS — J45.40 MODERATE PERSISTENT ASTHMA WITHOUT COMPLICATION: Primary | ICD-10-CM

## 2020-10-29 DIAGNOSIS — E11.65 UNCONTROLLED TYPE 2 DIABETES MELLITUS WITH HYPERGLYCEMIA: Primary | ICD-10-CM

## 2020-10-29 PROCEDURE — 99213 OFFICE O/P EST LOW 20 MIN: CPT | Mod: 95,,, | Performed by: NURSE PRACTITIONER

## 2020-10-29 PROCEDURE — 99213 PR OFFICE/OUTPT VISIT, EST, LEVL III, 20-29 MIN: ICD-10-PCS | Mod: 95,,, | Performed by: NURSE PRACTITIONER

## 2020-10-29 RX ORDER — INSULIN GLARGINE 300 U/ML
INJECTION, SOLUTION SUBCUTANEOUS
Qty: 15 ML | Refills: 3
Start: 2020-10-29 | End: 2020-11-04 | Stop reason: SDUPTHER

## 2020-10-29 RX ORDER — INSULIN ASPART 100 [IU]/ML
30 INJECTION, SOLUTION INTRAVENOUS; SUBCUTANEOUS
Qty: 15 ML | Refills: 0
Start: 2020-10-29 | End: 2020-11-25 | Stop reason: SDUPTHER

## 2020-10-29 NOTE — PROGRESS NOTES
Subjective:         Patient ID: Yolanda Betts is a 50 y.o. female.  Patient's current PCP is CHARLA Beach MD.       Chief Complaint: No chief complaint on file.    HPI  Yolanda Betts is a 50 y.o. Black or  female presenting for a new consult for diabetes. Patient has been diagnosed with diabetes for > 10 years.    Complications related to diabetes:  HTN, Hyperlipidemia, peripheral neuropathy.   Other pertinent history: denies Pancreatitis; denies Gastroparesis; denies DKA; denies Hx/family Hx of MEN2/MTC; reports Frequent UTIs/yeast infections    Past failed treatment include:  Jardiance- multiple yeast infections; Victoza - GI upset    Blood glucose testing is performed regularly, > 150 fasting , 180-200 PP. CGM - No    Compliance:The patient reports medication compliance all of the time and diet compliance most of the time.    The patient location is: home  The chief complaint leading to consultation is: DM consult/est care    Visit type: audiovisual    Face to Face time with patient: 20 minutes of total time spent on the encounter, which includes face to face time and non-face to face time preparing to see the patient (eg, review of tests), Obtaining and/or reviewing separately obtained history, Documenting clinical information in the electronic or other health record, Independently interpreting results (not separately reported) and communicating results to the patient/family/caregiver, or Care coordination (not separately reported). Each patient to whom he or she provides medical services by telemedicine is:  (1) informed of the relationship between the physician and patient and the respective role of any other health care provider with respect to management of the patient; and (2) notified that he or she may decline to receive medical services by telemedicine and may withdraw from such care at any time.         //   , There is no height or weight on file to calculate BMI.  Her  blood sugar in clinic today is:   Lab Results   Component Value Date    POCGLU 269 (A) 11/11/2019       Labs reviewed and are noted below.  Her most recent A1C is:  Lab Results   Component Value Date    HGBA1C 7.9 (H) 10/26/2020    HGBA1C 11.6 (H) 07/07/2020    HGBA1C 7.1 (H) 02/18/2020     Lab Results   Component Value Date    CPEPTIDE 4.56 02/26/2018     Lab Results   Component Value Date    GLUTAMICACID 0.00 02/26/2018     Glucose   Date Value Ref Range Status   07/28/2020 192 (H) 70 - 110 mg/dL Final     Anion Gap   Date Value Ref Range Status   07/28/2020 13 8 - 16 mmol/L Final     eGFR if    Date Value Ref Range Status   07/28/2020 >60 >60 mL/min/1.73 m^2 Final     eGFR if non    Date Value Ref Range Status   07/28/2020 >60 >60 mL/min/1.73 m^2 Final     Comment:     Calculation used to obtain the estimated glomerular filtration  rate (eGFR) is the CKD-EPI equation.          CURRENT DM MEDICATIONS:   Diabetes Medications             exenatide microspheres (BYDUREON) 2 mg/0.65 mL PnIj Inject 2 mg into the skin every 7 days.    glimepiride (AMARYL) 2 MG tablet Take 2 tablets (4 mg total) by mouth before breakfast.    insulin aspart U-100 (NOVOLOG FLEXPEN U-100 INSULIN) 100 unit/mL (3 mL) InPn pen Inject before meals three times a day up to 90 units daily taking 30 units TID         metFORMIN (GLUCOPHAGE) 1000 MG tablet Take 1 tablet (1,000 mg total) by mouth 2 (two) times daily with meals.    TOUJEO MAX U-300 SOLOSTAR 300 unit/mL (3 mL) InPn Inject 96 units once daily at bedtime taking 90 units         Diabetes Management Status    Statin: Taking  ACE/ARB: Taking    Screening or Prevention Patient's value Goal Complete/Controlled?   HgA1C Testing and Control   Lab Results   Component Value Date    HGBA1C 7.9 (H) 10/26/2020      Annually/Less than 8% Yes   Lipid profile : 07/07/2020 Annually Yes   LDL control Lab Results   Component Value Date    LDLCALC Invalid, Trig>400.0  07/07/2020    Annually/Less than 100 mg/dl  Yes   Nephropathy screening Lab Results   Component Value Date    LABMICR 48.0 07/07/2020     Lab Results   Component Value Date    PROTEINUA Negative 07/20/2020    Annually Yes   Blood pressure BP Readings from Last 1 Encounters:   10/28/20 110/70    Less than 140/90 Yes   Dilated retinal exam : 10/14/2020 Annually Yes   Foot exam   : 07/14/2020 Annually Yes     Review of Systems   Constitutional: Negative for activity change and appetite change.   Gastrointestinal: Positive for diarrhea (IBS). Negative for constipation.   Endocrine: Negative for polydipsia, polyphagia and polyuria.   Musculoskeletal: Positive for arthralgias.   Neurological: Negative for syncope and weakness.   Psychiatric/Behavioral: Negative for confusion.         Objective:      Physical Exam  Constitutional:       General: She is not in acute distress.     Appearance: She is not ill-appearing.   Neurological:      Mental Status: She is alert.   Psychiatric:         Speech: Speech normal.         Assessment:       1. Uncontrolled type 2 diabetes mellitus with hyperglycemia        Plan:   Uncontrolled type 2 diabetes mellitus with hyperglycemia        PLAN:   - Condition: suboptimal control   - Monitor blood glucose 4x daily.Goals reviewed  - She is working with the RD, has plans on having bariatric surgery  - BP and LDL- Recommended lifestyle modifications for management. Encouraged healthy low fat, low carb diet and increase physical activity  - Medication Changes:  Increase Toujeo 94 units, continue all other medications  - The patient was explained the above plan and given opportunity to ask questions.  She understands, chooses and consents to this plan and accepts all the risks, which include but are not limited to the risks mentioned above.   - Labs ordered as above  - Nurse visit: she will send her BG readings in 2 weeks   - Follow up: 6 weeks    A total of 20 minutes was spent in face to face  time, of which over 50% was spent in counseling patient on disease process, complications, treatment, and side effects of medications.

## 2020-10-29 NOTE — TELEPHONE ENCOUNTER
Orders Placed This Encounter   Procedures    NEBULIZER KIT (SUPPLIES) FOR HOME USE     Order Specific Question:   Height:     Answer:   4 7.5     Order Specific Question:   Weight:     Answer:   95.6kg     Order Specific Question:   Length of need (1-99 months):     Answer:   99     Order Specific Question:   Mask or Mouthpiece?     Answer:   Mouthpiece    NEBULIZER FOR HOME USE     Order Specific Question:   Height:     Answer:   4 7.5     Order Specific Question:   Weight:     Answer:   95.6kg     Order Specific Question:   Length of need (1-99 months):     Answer:   99

## 2020-10-29 NOTE — Clinical Note
Hi pt is requesting BG strips for dig med meter. Can you please assist her with this. I am generally not able place dig med orders. Thank you

## 2020-10-29 NOTE — TELEPHONE ENCOUNTER
Spoke with pt regarding her needing her blood testing scripts sent to the pharmacy instead of [facility].//[email or web address]

## 2020-10-29 NOTE — TELEPHONE ENCOUNTER
----- Message from Bianca Agosto sent at 10/29/2020 12:16 PM CDT -----  Regarding: rx needed  Contact: patient  Calling concerning a RX for nebulizer and tubing. Need a small unit please. Please call patient @ [phone]. thanks

## 2020-10-29 NOTE — TELEPHONE ENCOUNTER
----- Message from Elizabeth Ceron NP sent at 10/29/2020 10:14 AM CDT -----  Hi, pt is requesting BG strips for dig med meter. Can you please assist her with this. I am generally not able place dig med orders. Thank you

## 2020-10-30 ENCOUNTER — PATIENT OUTREACH (OUTPATIENT)
Dept: OTHER | Facility: OTHER | Age: 50
End: 2020-10-30

## 2020-10-30 ENCOUNTER — TELEPHONE (OUTPATIENT)
Dept: PULMONOLOGY | Facility: CLINIC | Age: 50
End: 2020-10-30

## 2020-10-30 DIAGNOSIS — J45.40 MODERATE PERSISTENT ASTHMA WITHOUT COMPLICATION: Primary | ICD-10-CM

## 2020-10-30 NOTE — TELEPHONE ENCOUNTER
Orders Placed This Encounter   Procedures    NEBULIZER KIT (SUPPLIES) FOR HOME USE     Order Specific Question:   Height:     Answer:   4 7.5     Order Specific Question:   Weight:     Answer:   95.6kg     Order Specific Question:   Length of need (1-99 months):     Answer:   99     Order Specific Question:   Mask or Mouthpiece?     Answer:   Mouthpiece

## 2020-10-30 NOTE — TELEPHONE ENCOUNTER
----- Message from Sangita Beaulieu sent at 10/30/2020  2:11 PM CDT -----  Regarding: Returning call  Contact: Pt  Type:  Patient Returning Call    Who Called:Yolanda Betts  Who Left Message for Patient:Consuelo  Does the patient know what this is regarding?:nebulizer order  Would the patient rather a call back or a response via [facility]? Call back   Best Call Back Number:[phone]  Additional Information:

## 2020-11-02 ENCOUNTER — TELEPHONE (OUTPATIENT)
Dept: PULMONOLOGY | Facility: CLINIC | Age: 50
End: 2020-11-02

## 2020-11-02 ENCOUNTER — PATIENT MESSAGE (OUTPATIENT)
Dept: ORTHOPEDICS | Facility: CLINIC | Age: 50
End: 2020-11-02

## 2020-11-02 ENCOUNTER — CLINICAL SUPPORT (OUTPATIENT)
Dept: REHABILITATION | Facility: HOSPITAL | Age: 50
End: 2020-11-02
Payer: MEDICAID

## 2020-11-02 DIAGNOSIS — R29.3 POOR POSTURE: ICD-10-CM

## 2020-11-02 DIAGNOSIS — M25.611 DECREASED RIGHT SHOULDER RANGE OF MOTION: ICD-10-CM

## 2020-11-02 DIAGNOSIS — M62.81 PROXIMAL MUSCLE WEAKNESS: ICD-10-CM

## 2020-11-02 PROCEDURE — 97140 MANUAL THERAPY 1/> REGIONS: CPT | Mod: CQ

## 2020-11-02 PROCEDURE — 97110 THERAPEUTIC EXERCISES: CPT | Mod: CQ

## 2020-11-02 RX ORDER — PEN NEEDLE, DIABETIC 30 GX3/16"
NEEDLE, DISPOSABLE MISCELLANEOUS
Qty: 200 EACH | Refills: 3 | Status: SHIPPED | OUTPATIENT
Start: 2020-11-02 | End: 2021-11-16 | Stop reason: SDUPTHER

## 2020-11-02 RX ORDER — PEN NEEDLE, DIABETIC 30 GX3/16"
NEEDLE, DISPOSABLE MISCELLANEOUS
Qty: 200 EACH | Refills: 3 | Status: SHIPPED | OUTPATIENT
Start: 2020-11-02 | End: 2022-07-08 | Stop reason: SDUPTHER

## 2020-11-02 RX ORDER — INSULIN ASPART 100 [IU]/ML
INJECTION, SOLUTION INTRAVENOUS; SUBCUTANEOUS
Qty: 45 ML | Refills: 0 | Status: SHIPPED | OUTPATIENT
Start: 2020-11-02 | End: 2020-12-14 | Stop reason: SDUPTHER

## 2020-11-02 NOTE — TELEPHONE ENCOUNTER
Chronic Disease Management:   Called patient to confirm Pulmonary Disease Management appointment scheduled on 11/3/20 beginning at 3:00 PM for a virtual visit. Patient confirmed.

## 2020-11-02 NOTE — PROGRESS NOTES
Physical Therapy Daily Treatment Note     Name: Yolanda Martin [facility]  Clinic Number: [id number]    Therapy Diagnosis:   Encounter Diagnoses   Name Primary?    Proximal muscle weakness     Poor posture     Decreased right shoulder range of motion      Physician: Maikel Redmond MD    Visit Date: 11/2/2020    Physician Orders: PT Eval and Treat  Medical Diagnosis from Referral: R shoulder pain  Evaluation Date: 8/31/2020  Authorization Period Expiration: 12/31/2020  Plan of Care Expiration: 11/23/2020  Visit # / Visits authorized: 11 (10 of 20)      Precautions: Standard, Diabetes and Bipolar disored, schizoaffective disorder    Time In: 7:00 am  Time Out: 7:45 am  Total Billable Time: 41 minutes    SUBJECTIVE     Pt reports: her shoulder has been hurting since cleaning house and washing clothes over the weekend.    Patient was compliant with home exercise program   Response to previous treatment: Felt good following last session   Functional change: Unable to perform home chores without pain.     Pre-Treatment Pain: 7/10  Post-Treatment Pain: 5/10  Location: R shoulder and scapular    TREATMENT     Yolanda received therapeutic exercises to develop strength, ROM, posture and core stabilization for 28 minutes including: (exercises performed today listed in bold)    Exercise    Rope and pulley  3 minutes each  Flexion and abduction   Upper trapezius stretch     Manual resisted elbow flexion and extension AROM     Shoulder flexion table slides     R shoulder PROM  Flexion, abduction, IR and ER, scaption, adduction  5 minutes    6 way shoulder isometrics  2 x 10, 2 sec hold each on R flexion, abduction, adduction, and extension     Bicep Curls  3#, 3 x 10, seated   Shoulder flexion with dowel 3# dowel, 3 x 10        Shoulder external rotation AAROM  2#, 2 x 10 on R   Seated scapular retractions Yellow band, 3 x 10    Side lying shoulder external rotation  3 x 10 on R 1 pound   Supine serratus punches  3# dowel, 3 x 10         Yolanda received the following manual therapy techniques: Joint mobilizations and Soft tissue Mobilization were applied to the: R shoulder for 13 minutes, including:  STM of right biceps brachii, deltoid, supraspinatus  and infraspinatus       Moist heat pack applied to R should at end of session for 0 minutes    Home Exercises Provided and Patient Education Provided     Education/Self-Care provided:    Patient educated on biomechanical justification for therapeutic exercise and importance of compliance with HEP in order to improve overall impairments and QOL    Patient educated on the importance of improved core and upper extremity strength in order to improve alignment of the spine and upper extremities with static positions and dynamic movement.    Educated on needing to complete shoulder rehab before addressing low back referral     Written Home Exercises Provided: yes.  Exercises were reviewed and Yolanda was able to demonstrate them prior to the end of the session.  Yolanda demonstrated good  understanding of the education provided.     See EMR under Patient Instructions for exercises provided 9/21/2020.    ASSESSMENT   Patient demonstrated full shoulder PROM with reports of a stretch but no pain at end range. Added side lying shoulder external rotation with fatigue. Patient left session with reports of feeling better.      Yolanda is progressing well towards her goals.   Pt prognosis is Fair.     Pt will continue to benefit from skilled outpatient physical therapy to address the deficits listed in the problem list box on initial evaluation, provide pt/family education and to maximize pt's level of independence in the home and community environment.     Pt's spiritual, cultural and educational needs considered and pt agreeable to plan of care and goals.     Anticipated Barriers for therapy: co-morbidities, sedentary lifestyle, chronicity of condition, lack of understanding of condition and adherence to treatment  plan    GOALS:     Short Term Goals:  6 weeks     1. Pain: Pt will demonstrate improved pain by reports of less than or equal to 7/10 worst pain on the verbal rating scale in order to progress toward maximal functional ability and improve QOL.     2. Function: Patient will demonstrate improved function as indicated by a functional limitation score of less than or equal to 58 out of 100 on FOTO.     3. Mobility: Patient will improve AROM to 50% of stated goals, listed in objective measures above, in order to progress towards independence with functional activities.      4. Strength: Patient will improve strength to 50% of stated goals, listed in objective measures above, in order to progress towards independence with functional activities.      5. HEP: Patient will demonstrate independence with HEP in order to progress toward functional independence.        Long Term Goals:  12 weeks     1. Pain: Pt will demonstrate improved pain by reports of less than or equal to 5/10 worst pain on the verbal rating scale in order to progress toward maximal functional ability and improve QOL.       2. Function: Patient will demonstrate improved function as indicated by a functional limitation score of less than or equal to 48 out of 100 on FOTO.     3. Mobility: Patient will improve AROM to stated goals, listed in objective measures above, in order to return to maximal functional potential and improve quality of life.     4. Strength: Patient will improve strength to stated goals, listed in objective measures above, in order to improve functional independence and quality of life.     5. Patient will return to normal ADL's, IADL's, community involvement, recreational activities, and work-related activities with less than or equal to 5/10 pain and maximal function.             PLAN   Continue Plan of Care to strengthen shoulder and scapular while maintaining range of motion allowing patient to be able to return to all functional  activities with no pain.     Evaluation: 8/23/2020  Progress Note: 9/28/2020  Progress Note: 10/26/2020   POC Expiration: 11/23/2020     Raz Orozco PTA

## 2020-11-03 ENCOUNTER — CLINICAL SUPPORT (OUTPATIENT)
Dept: PULMONOLOGY | Facility: CLINIC | Age: 50
End: 2020-11-03
Payer: MEDICAID

## 2020-11-03 ENCOUNTER — TELEPHONE (OUTPATIENT)
Dept: ORTHOPEDICS | Facility: CLINIC | Age: 50
End: 2020-11-03

## 2020-11-03 ENCOUNTER — TELEPHONE (OUTPATIENT)
Dept: OBSTETRICS AND GYNECOLOGY | Facility: CLINIC | Age: 50
End: 2020-11-03

## 2020-11-03 VITALS — RESPIRATION RATE: 16 BRPM | BODY MASS INDEX: 48.11 KG/M2 | HEIGHT: 56 IN

## 2020-11-03 DIAGNOSIS — J45.40 MODERATE PERSISTENT ASTHMA WITHOUT COMPLICATION: ICD-10-CM

## 2020-11-03 DIAGNOSIS — R94.2 DIFFUSION CAPACITY OF LUNG (DL), DECREASED: Chronic | ICD-10-CM

## 2020-11-03 NOTE — TELEPHONE ENCOUNTER
Patient called to cancel appointment today for 4:00.  I do not see an appointment.  Assisted patient in rescheduling.

## 2020-11-03 NOTE — PATIENT INSTRUCTIONS
Asthma Trigger Checklist  Allergens, irritants, and other things may trigger your asthma. Check the box next to each of your triggers. After each trigger is a list of ways to avoid it.   Dust mites. Dust mites live in mattresses, bedding, carpets, curtains, and indoor dust.   To kill dust mites, wash bedding in hot water (130°F) each week.   Cover mattress and pillows with special dust-mite-proof cases.   Don't use upholstered furniture like sofas or chairs in the bedroom.   Use allergy-proof filters for air conditioners and furnaces. Replace or clean them as instructed.   If you can, replace carpeting with wood or tile marisela, especially in the bedroom.  Animals. Animals with fur or feathers shed dander (allergens).   It's best to choose a pet that doesn't have fur or feathers, such as a fish or a reptile.   If you have pets, keep them off your bed and out of your bedroom.   Wash your hands and clothes after handling pets.    Mold. Mold grows in damp places, such as bathrooms, basements, and closets.   Ask someone to clean damp areas in your home every week. Or try wearing a face mask while you clean.   Run an exhaust fan while bathing. Or leave a window open in the bathroom.   Repair water leaks in or around your home.   Have someone else cut grass or rake leaves, if possible.   Don't use vaporizers or humidifiers. They encourage mold growth.    Pollen. Pollen from trees, grasses, and weeds is a common allergen. (Flower pollens are generally not a problem).   Try to learn what types of pollen affect you most. Pollen levels vary depending on the plant, the season, and the time of day.   If possible, use air conditioning instead of opening the windows in your home or car.   Have someone else do yard work, if possible.    Cockroaches. Roaches are found in many homes and produce allergens.   Keep your kitchen clean and dry. A leaky faucet or drain can attract roaches.   Remove garbage from your  home daily.   Store food in tightly sealed containers. Wash dishes as soon as they are used.   Use bait stations or traps to control roaches. Avoid using chemical sprays.    Smoke. Smoke may be from cigarettes, cigars, pipes, incense or candles, barbecues or grills, and fireplaces.   Don't smoke. And don't let people smoke in your home or car.   When you travel, ask for nonsmoking rental cars and hotel rooms.   Avoid fireplaces and wood stoves. If you can't, sit away from them. Make sure the smoke is directed outside.   Don't burn incense or use candles.   Move away from smoky outdoor cooking grills.    Smog.  Smog is from car exhaust and other pollution.   Read or listen to local air-quality reports. These let you know when air quality is poor.   Stay indoors as much as you can on smoggy days. If possible, use air conditioning instead of opening the windows.   In your car, set air conditioning to recirculate air, so less pollution gets in.    Strong odors. These include air fresheners, deodorizers, and cleaning products; perfume, deodorant, and other beauty products; incense and candles; and insect sprays and other sprays.   Use scent-free products like deodorant or body lotion.   Avoid using cleaning products with bleach and ammonia. Make your own cleaning solution with white vinegar, baking soda, or mild dish soap.   Use exhaust fans while cooking. Or open a window, if possible.    Avoid perfumes, air fresheners, potpourri, and other scented products.          Other irritants. These include dust, aerosol sprays, and powders.   Wear a face mask while doing tasks like sanding, dusting, sweeping, and yard work. Open doors and windows if working indoors.   Use pump spray bottles instead of aerosols.   Pour liquid  onto a rag or cloth instead of spraying them.    Weather. Weather conditions can trigger symptoms or make them worse.   Watch for very high or low temperatures, very humid  conditions, or a lot of wind, as these conditions can make symptoms worse.   Limit outdoor activity during the type of weather that affects you.   Wear a scarf over your mouth and nose in cold weather.    Colds, flu, and sinus infections. Upper respiratory infections can trigger asthma.   Wash your hands often with soap and warm water or use a hand  containing alcohol.   Get a yearly flu shot. And ask if you should get a pneumonia vaccine.   Take care of your general health. Get plenty of sleep. And eat a variety of healthy foods.    Food additives. Food additives can trigger asthma flare-ups in some people.   Check food labels for sulfites or other similar ingredients. These are often found in foods such as wine, beer, and dried fruits.   Avoid foods that contain these additives.    Medicine. Aspirin, NSAIDS like ibuprofen and naproxen, and heart medicines like beta-blockers may be triggers.   Tell your health care provider if you think certain medicines trigger symptoms.    Be sure to read the labels on over-the-counter medicines. They may have ingredients that cause symptoms for you.   , Emotions. Laughing, crying, or feeling excited are triggers for some people.    To help you stay calm: Try breathing in slowly through your nose for a count of 2 seconds. Close your lips and breathe out for 4 seconds. Repeat.   Try to focus on a soothing image in your mind. This will help relax you and calm your breathing.   Remember to take your daily controller medicines. When you're upset or under stress, it's easy to forget.    Exercise. For some people, exercise can trigger symptoms. Don't let this keep you from being active.    If you have not been exercising regularly, start slow and work up gradually.   Take all of your medicines as prescribed.   If you use quick-relief medicine, make sure you have it with you when you exercise.   Stop if you have any symptoms. Make sure you talk with your  provider about these symptoms.  © 4332-1753 [employer] [address] [place] [address], [place] [address]. All rights reserved. This information is not intended as a substitute for professional medical care. Always follow your healthcare professional's instructions.            Using an Inhaler with a Spacer  To control asthma, you need to use your medicines the right way. Some medicines are inhaled using a device called a metered-dose inhaler (MDI). Metered-dose inhalers deliver medicine with a fine spray. You may be asked to use a spacer (holding tube) with your inhaler. The spacer helps make sure all the medicine you need goes into your lungs.   Steps for using an inhaler with a spacer  Step 1:  · Remove the caps from the inhaler and spacer.  · Shake the inhaler well and attach the spacer. If the inhaler is being used for the first time or has not been used for a while, prime it as directed by the product maker.  Step 2:  · Breathe out normally.  · Put the spacer between your teeth. Close your lips tightly around it.  · Keep your chin up.  Step 3:  · Spray 1 puff into the spacer by pressing down on the inhaler.  · Then breathe in through your mouth as slowly and deeply as you can. This should take about 5-10 seconds. If you breathe too quickly, you may hear a whistling sound in certain spacers.  Step 4:  · Take the spacer out of your mouth.  · Hold your breath for a count of 10.  · Then hold your lips together and slowly breathe out through your mouth.          Step-by-Step     If youre prescribed more than 1 puff of medicine at a time, wait at least 30 seconds between puffs. This number may be different for different medicines. Shake the inhaler again. Then repeat steps 2 to 4.   ACTION PLAN    GREEN ZONE   My sputum is clear/white/usual color and easily cleared.   My breathing is no harder than usual.   I can do my usual activities.   I can think clearly.   Take your usual medicines, including  oxygen, as you are told to do so by your health care provider.   YELLOW ZONE   My sputum has change (color, thickness, amount).   I am more short of breath than usual.   I cough or wheeze more.   I weigh more and my legs/feet swell.   I cannot do my usual activities without resting.   Call your health care provider. You will probably be told to begin taking an antibiotic and prednisone. Have your pharmacy phone number available.   RED ZONE   I cannot cough out my sputum.   I am much more short of breath than normal.   I need to sit up to breathe   I cannot do my usual activities.   I am unable to speak more than one or two words at a time.   I am confused.   Call your health care provider. You may be asked to come in to be seen, told to go to the emergency room, or told to call 9-1-1.

## 2020-11-03 NOTE — PROGRESS NOTES
"  Pulmonary Disease Management  [facility]  Initial Visit- Virtual Visit     Referring Provider: Dr. Bailey  Diagnosis: Diffusion capacity of lung (dl), decreased, Moderate persistent asthma without complication  Last Hospital Admission: 1/30/20  Last provider visit: 1/26/21      Current Outpatient Medications:     albuterol (PROVENTIL) 2.5 mg /3 mL (0.083 %) nebulizer solution, Take 3 mLs (2.5 mg total) by nebulization every 4 (four) hours., Disp: 180 mL, Rfl: 11    amLODIPine (NORVASC) 10 MG tablet, Take 1 tablet (10 mg total) by mouth every evening., Disp: 90 tablet, Rfl: 3    amLODIPine (NORVASC) 10 MG tablet, Take 1 tablet (10 mg total) by mouth every evening., Disp: 30 tablet, Rfl: 11    amLODIPine (NORVASC) 5 MG tablet, Take 1 tablet (5 mg total) by mouth every evening., Disp: 30 tablet, Rfl: 11    ammonium lactate 12 % Crea, Apply 1 Application topically 2 (two) times daily., Disp: 140 g, Rfl: 3    ARIPiprazole (ABILIFY) 10 MG Tab, Take 1 tablet (10 mg total) by mouth once daily., Disp: 30 tablet, Rfl: 2    aspirin (ECOTRIN) 81 MG EC tablet, Take 81 mg by mouth once daily., Disp: , Rfl:     atorvastatin (LIPITOR) 20 MG tablet, Take 1 tablet (20 mg total) by mouth every evening., Disp: 90 tablet, Rfl: 0    BD INSULIN SYRINGE ULTRA-FINE 1/2 mL 30 gauge x 1/2" Syrg, , Disp: , Rfl: 0    benztropine (COGENTIN) 1 MG tablet, Take 1 tablet (1 mg total) by mouth every evening., Disp: 30 tablet, Rfl: 1    blood sugar diagnostic Strp, Check blood glucose 4 times daily as directed and as needed (dispense insurance preferred brand or patient choice), Disp: 200 each, Rfl: 5    blood-glucose meter Misc, Use to check blood sugars, give meter based on insurance specification, Disp: 1 each, Rfl: 1    budesonide-formoterol 160-4.5 mcg (SYMBICORT) 160-4.5 mcg/actuation HFAA, Inhale 2 puffs into the lungs every 12 (twelve) hours. Controller : Use spacer, Disp: 10.2 g, Rfl: 11    clonazePAM (KLONOPIN) 1 MG " tablet, Take 1 tablet by mouth three times a day, Disp: 90 tablet, Rfl: 2    clotrimazole-betamethasone 1-0.05% (LOTRISONE) cream, Apply to affected area 2 times daily, Disp: 15 g, Rfl: 1    cyclobenzaprine (FLEXERIL) 10 MG tablet, Take 1 tablet (10 mg total) by mouth daily as needed (for back muscle spasms)., Disp: 30 tablet, Rfl: 11    cyclosporine 0.05 % Drop, Place 1 drop into both eyes 2 (two) times daily., Disp: 5.5 mL, Rfl: 11    DULoxetine (CYMBALTA) 60 MG capsule, Take 1 capsule by mouth twice daily, Disp: 60 capsule, Rfl: 1    DULoxetine (CYMBALTA) 60 MG capsule, Take 1 capsule by mouth twice daily, Disp: 60 capsule, Rfl: 2    ergocalciferol (ERGOCALCIFEROL) 50,000 unit Cap, Take 1 capsule (50,000 Units total) by mouth every 14 (fourteen) days., Disp: 4 capsule, Rfl: 5    exenatide microspheres (BYDUREON) 2 mg/0.65 mL PnIj, Inject 2 mg into the skin every 7 days., Disp: 4 each, Rfl: 3    fish oil-omega-3 fatty acids 300-1,000 mg capsule, Take 1 capsule by mouth once daily., Disp: , Rfl:     frovatriptan (FROVA) 2.5 MG tablet, Take 1 tablet at onset of acute migraine. May take 1 more tablet 2 hours later if needed. (MAX 2 TABLET PER DAY. MAX 4 TABLETS PER WEEK.), Disp: 9 tablet, Rfl: 5    furosemide (LASIX) 20 MG tablet, Take 1 tablet (20 mg total) by mouth once daily., Disp: 30 tablet, Rfl: 11    glimepiride (AMARYL) 2 MG tablet, Take 2 tablets (4 mg total) by mouth before breakfast., Disp: 60 tablet, Rfl: 3    hydrALAZINE (APRESOLINE) 10 MG tablet, Take 1 tablet (10 mg total) by mouth every 12 (twelve) hours., Disp: 60 tablet, Rfl: 11    insulin aspart U-100 (NOVOLOG FLEXPEN U-100 INSULIN) 100 unit/mL (3 mL) InPn pen, Inject 30 Units into the skin 3 (three) times daily before meals. Inject before meals three times a day up to 90 units daily, Disp: 15 mL, Rfl: 0    insulin aspart U-100 (NOVOLOG FLEXPEN U-100 INSULIN) 100 unit/mL (3 mL) InPn pen, Inject before meals three times a day up to 90  "units daily, Disp: 45 mL, Rfl: 0    lancets 28 gauge Misc, use as directed 3 times daily, Disp: 100 each, Rfl: 11    linaCLOtide (LINZESS) 145 mcg Cap capsule, Take 1 capsule (145 mcg total) by mouth daily as needed (for constipation)., Disp: 30 capsule, Rfl: 11    lubiprostone (AMITIZA) 24 MCG Cap, Take 1 capsule (24 mcg total) by mouth 2 (two) times daily., Disp: 60 capsule, Rfl: 2    lurasidone (LATUDA) 120 mg Tab, Take 1 tablet by mouth at bedtime, Disp: 30 tablet, Rfl: 2    magnesium oxide (MAG-OX) 400 mg (241.3 mg magnesium) tablet, Take 1 tablet (400 mg total) by mouth once daily., Disp: 90 tablet, Rfl: 3    metFORMIN (GLUCOPHAGE) 1000 MG tablet, Take 1 tablet (1,000 mg total) by mouth 2 (two) times daily with meals., Disp: 180 tablet, Rfl: 4    metoprolol succinate (TOPROL-XL) 100 MG 24 hr tablet, Take 2 tablets (200 mg total) by mouth every evening., Disp: 180 tablet, Rfl: 3    metoprolol succinate (TOPROL-XL) 200 MG 24 hr tablet, Take 1 tablet by mouth once daily in the evening., Disp: 90 tablet, Rfl: 3    mirtazapine (REMERON) 45 MG tablet, Take 1 tablet by mouth at bedtime, Disp: 30 tablet, Rfl: 2    montelukast (SINGULAIR) 10 mg tablet, Take 1 tablet (10 mg total) by mouth every evening., Disp: 30 tablet, Rfl: 11    MULTIVIT,CALC,MINS/IRON/FOLIC (DAILY MULTIPLE FOR WOMEN ORAL), Take 1 tablet by mouth once daily., Disp: , Rfl:     pen needle, diabetic (BD ULTRA-FINE MINI PEN NEEDLE) 31 gauge x 3/16" Ndle, Use 5 a day, Disp: 200 each, Rfl: 3    pen needle, diabetic (BD ULTRA-FINE MINI PEN NEEDLE) 31 gauge x 3/16" Ndle, Use 5 a day, Disp: 200 each, Rfl: 3    pregabalin (LYRICA) 150 MG capsule, Take 1 capsule (150 mg total) by mouth 2 (two) times daily., Disp: 60 capsule, Rfl: 3    promethazine (PHENERGAN) 25 MG tablet, , Disp: , Rfl:     spironolactone (ALDACTONE) 25 MG tablet, Take 1 tablet (25 mg total) by mouth once daily., Disp: 30 tablet, Rfl: 6    topiramate (TOPAMAX) 50 MG tablet, " "Take 1 tablet (50 mg total) by mouth 2 (two) times daily., Disp: 60 tablet, Rfl: 11    TOUJEO MAX U-300 SOLOSTAR 300 unit/mL (3 mL) InPn, Inject 94 units once daily at bedtime, Disp: 15 mL, Rfl: 3    valACYclovir (VALTREX) 1000 MG tablet, Take 1 tablet (1,000 mg total) by mouth once daily., Disp: 30 tablet, Rfl: 11    valsartan (DIOVAN) 320 MG tablet, Take 1 tablet (320 mg total) by mouth once daily., Disp: 90 tablet, Rfl: 3    zolpidem (AMBIEN) 10 mg Tab, Take 1 tablet by mouth at bedtime, Disp: 30 tablet, Rfl: 2    Review of patient's allergies indicates:   Allergen Reactions    Codeine Itching    Ibuprofen     Mobic [meloxicam]     Neuromuscular blockers, steroidal Hives     some    Latex, natural rubber Rash    Morphine Rash     itching    Norco [hydrocodone-acetaminophen] Itching, Rash and Hallucinations    Seconal [secobarbital sodium] Rash     itching    Tylox [oxycodone-acetaminophen] Rash       Smoking history:   Social History     Tobacco Use   Smoking Status Never Smoker   Smokeless Tobacco Never Used                Resp: 16  Height: 4' 7.5" (141 cm)  Resting Deysi Dyspnea Rating : 4   Current Oxygen Use: Yes  Device: Nasal Cannula  Liter Flow: 2  Oxygen Usage Duration: Only while sleeping  DME Provider: [facility]   Does the patient use BiPAP, CPAP or Trilogy?: No     ACT Questionnaire:  Asthma Control Test  In the past 4  weeks, how much of the time did your asthma keep you from getting as much done at work, school or at home?: Some of the time  During the past 4 weeks, how often have you had shortness of breath?: More than once a day  During the past 4 weeks, how often did your asthma symptoms (wheezing, couging, shortness of breath, chest tightness or pain) wake you up at night or earlier than usual in the morning?: 4 or more nights a week  During the past 4 weeks, how often have you used your rescue inhaler or nebulizer medication (such as albuterol)?: 1 or 2 times per day  How would you " rate your asthma control during the past 4 weeks?: Somewhat controlled  If your score is 19 or less, your asthma may not be under control: 10    Has this patient had any pulmonary studies (PFTS)?: Yes  PFT Results:  Pre FVC   Date/Time Value Ref Range Status   10/27/2020 10:16 PM 1.51 L Final     Pre FEV1   Date/Time Value Ref Range Status   10/27/2020 10:16 PM 1.22 L Final     Pre FEV1 FVC   Date/Time Value Ref Range Status   10/27/2020 10:16 PM 81.05 % Final     Pre TLC   Date/Time Value Ref Range Status   07/08/2019 03:22 PM 3.33 2.60 - 4.57 L Final     Pre DLCO   Date/Time Value Ref Range Status   07/08/2019 03:22 PM 12.99 (L) 13.89 - 25.36 ml/(min*mmHg) Final         Is this patient a candidate for pulmonary rehab?: No  Method Used for Education: Literature, Demonstration, Verbal  Did the patient demonstrate understanding?: Yes  What tests has the patient had done today?: Spirometry      Educational assessment:   [x]            Good  []            Fair  []            Poor    Readiness to learn:   [x]            Good  []            Fair  []            Poor    Vision Status:   [x]            Good  []            Fair  []            Poor    Reading Ability:  [x]            Good  []            Fair  []            Poor    Knowledge of condition:   [x]            Good  []            Fair  []            Poor    Language Barriers:   []            Good  []            Fair  []            Poor  [x]            None    Cognitive/ Physical Barriers:   []            Good  []            Fair  []            Poor  [x]            None    Learning best by:                       [x]            Seeing  []            Hearing  []            Reading                         [x]            Doing    Describe any barrier /Limitation or financial implications of care choices identified     []            Financial  []            Emotional  []            Education  []            Vision/Hearing  []            Physical  [x]             None    TOPIC /CONTENT FOR TODAY:    [x]            MDI with or without spacer  []            Dry powder inhaler  []            Acapella   []           Peak Flow meter  [x]            Asthma action plan  []            Nebulizer use  [x]            Oxygen use safety  [x]            Breathing and cough techniques  [x]            Energy conservation  [x]            Infection prevention  []           OTHER________________________        Learner:    [x]            Patient   []            Caregiver    Method:    [x]            Verbal explanation  [x]            Audio visual    [x]            Literature  [x]            Teach back      Evaluation:    [x]            Teach back  [x]            Demonstrate  [x]            Follow up phone call    []            2 weeks     [x]            4 weeks   [x]            PRN      Therapist Comments:   Patient was seen today to review respiratory medication purpose and proper technique for use of inhalers. Patient demonstrated understanding. Literature was given to patient.     Reviewed breathing techniques such as pursed-lip breathing. Patient practiced proper technique and verbalized understanding. Literature given to patient.      Asthma trigger checklist was verbally reviewed and literature given to patient.     Infection prevention was discussed. Patient's immunizations are current. Hand hygiene and cleaning of respiratory equipment was also discussed. Patient verbalized understanding.      Asthma action plan was reviewed and literature was given to patient. Patient verbalized understanding.     Plan:  Monthly Pulmonary Disease Management Questionnaire  Follow-up PDM appointment scheduled for 5/5/21 at 8:30 AM at [facility]   Reinforce education  Meds: Symbicort, albuterol   DME Needs: [facility]  Action Plan: Asthma   Immunization: Pneumococcal- current, Flu-current  Next Provider Visit: 1/26/21  Next Spirometry/CPFT: 1/26/21  Approximate time spent with patient: 45 mins

## 2020-11-06 ENCOUNTER — TELEPHONE (OUTPATIENT)
Dept: GASTROENTEROLOGY | Facility: CLINIC | Age: 50
End: 2020-11-06

## 2020-11-06 ENCOUNTER — TELEPHONE (OUTPATIENT)
Dept: RHEUMATOLOGY | Facility: CLINIC | Age: 50
End: 2020-11-06

## 2020-11-06 DIAGNOSIS — M79.7 FIBROMYALGIA: Chronic | ICD-10-CM

## 2020-11-06 DIAGNOSIS — R14.0 BLOATING: ICD-10-CM

## 2020-11-06 DIAGNOSIS — R10.9 ABDOMINAL PAIN, UNSPECIFIED ABDOMINAL LOCATION: ICD-10-CM

## 2020-11-06 DIAGNOSIS — K59.00 CONSTIPATION, UNSPECIFIED CONSTIPATION TYPE: Primary | ICD-10-CM

## 2020-11-06 NOTE — TELEPHONE ENCOUNTER
Patient states that Lyrica is making her swell and also makes her angry. She has gained 16 lbs since starting . Patient would like to go back on Gabapentin . Uses [facility] .

## 2020-11-06 NOTE — TELEPHONE ENCOUNTER
Mojgan Franklin PA-C  You 24 minutes ago (12:43 PM)     We could repeat colonoscopy or do sitz marker test.     Message text       You routed conversation to Mojgan Franklin PA-C 2 hours ago (11:01 AM)      You 2 hours ago (11:01 AM)        Patient states that medication Amitiza is not working for her. She states that she is taking it twice daily and is not having bowel movements. Patient reports that she gave herself an enema 4-5 days ago and 1 enema today. Patient stated that enemas were effective but she does not feel like she cleaned herself out all the way. Patient has concerns stating that she can not go to work in this condition. Please advise.         Documentation       You  Yolanda Betts 2 hours ago (10:57 AM)      You 2 hours ago (10:57 AM)        ----- Message from Emmy Borges sent at 11/6/2020  9:27 AM CST -----  Contact: Pt  Pt called to advise that she is still constipated and she wants medical advice.  Please call her back at [phone] (home)      Thanks     Emmy Borges

## 2020-11-06 NOTE — TELEPHONE ENCOUNTER
----- Message from Pati Solorzano sent at 11/6/2020 10:53 AM CST -----  Good morning,      Pt would like a to get medication reordered Gabapintin  due to the Clarita side effects that she had. Pt can be reached at [phone]        [facility] [address] [place] [address]  Phone: [phone] Fax: [phone]      Thanks,  Pati Solorzano

## 2020-11-06 NOTE — TELEPHONE ENCOUNTER
----- Message from Emmy Borges sent at 11/6/2020  9:27 AM CST -----  Contact: Pt  Pt called to advise that she is still constipated and she wants medical advice.  Please call her back at [phone] (home)     Thanks    Emmy Borges

## 2020-11-06 NOTE — TELEPHONE ENCOUNTER
Patient states that medication Amitiza is not working for her. She states that she is taking it twice daily and is not having bowel movements. Patient reports that she gave herself an enema 4-5 days ago and 1 enema today. Patient stated that enemas were effective but she does not feel like she cleaned herself out all the way. Patient has concerns stating that she can not go to work in this condition. Please advise.

## 2020-11-10 ENCOUNTER — TELEPHONE (OUTPATIENT)
Dept: INTERNAL MEDICINE | Facility: CLINIC | Age: 50
End: 2020-11-10

## 2020-11-10 ENCOUNTER — PATIENT MESSAGE (OUTPATIENT)
Dept: DIABETES | Facility: CLINIC | Age: 50
End: 2020-11-10

## 2020-11-10 ENCOUNTER — TELEPHONE (OUTPATIENT)
Dept: PAIN MEDICINE | Facility: CLINIC | Age: 50
End: 2020-11-10

## 2020-11-10 DIAGNOSIS — E11.65 UNCONTROLLED TYPE 2 DIABETES MELLITUS WITH HYPERGLYCEMIA: Primary | ICD-10-CM

## 2020-11-10 RX ORDER — GABAPENTIN 300 MG/1
600 CAPSULE ORAL 3 TIMES DAILY
Qty: 90 CAPSULE | Refills: 3 | OUTPATIENT
Start: 2020-11-10 | End: 2021-11-10

## 2020-11-10 NOTE — TELEPHONE ENCOUNTER
Pt was under the impression that she would need to fill out some pre visit paperwork for her scheduled appointment on 11/13/2020 at this time we do not have any paperwork that has to be filled out prior to appointment.  All questions answered//dp

## 2020-11-10 NOTE — TELEPHONE ENCOUNTER
----- Message from Prema Mata sent at 11/10/2020  9:31 AM CST -----  Contact: pt  Pt requesting a call back regarding her appt on 11/13/2020. She would like to know if there is any paperwork that she is needing to do. She states that this is the second message that she has left regarding. She states that she was told that a questionnaire would be sent out to her. Please call pt back at [phone]

## 2020-11-11 ENCOUNTER — TELEPHONE (OUTPATIENT)
Dept: INTERNAL MEDICINE | Facility: CLINIC | Age: 50
End: 2020-11-11

## 2020-11-11 NOTE — TELEPHONE ENCOUNTER
----- Message from Josselyn Stringer sent at 11/11/2020  7:48 AM CST -----  Regarding: appt  Contact: patient  Patient requesting same say appt for cough and sinus drip, please call her back at [phone]

## 2020-11-11 NOTE — TELEPHONE ENCOUNTER
Spoke with pt regarding her wanting be seen for sinus drip and cough.Informed pt that I have sent message to .//GWENDOLYN   Attending Only

## 2020-11-12 ENCOUNTER — CLINICAL SUPPORT (OUTPATIENT)
Dept: REHABILITATION | Facility: HOSPITAL | Age: 50
End: 2020-11-12
Payer: MEDICAID

## 2020-11-12 DIAGNOSIS — R29.3 POOR POSTURE: ICD-10-CM

## 2020-11-12 DIAGNOSIS — M25.611 DECREASED RIGHT SHOULDER RANGE OF MOTION: ICD-10-CM

## 2020-11-12 DIAGNOSIS — M62.81 PROXIMAL MUSCLE WEAKNESS: ICD-10-CM

## 2020-11-12 PROCEDURE — 97110 THERAPEUTIC EXERCISES: CPT | Mod: CQ

## 2020-11-12 PROCEDURE — 97140 MANUAL THERAPY 1/> REGIONS: CPT | Mod: CQ

## 2020-11-12 NOTE — PROGRESS NOTES
Physical Therapy Daily Treatment Note     Name: Yolanda Martin [facility]  Clinic Number: [id number]    Therapy Diagnosis:   Encounter Diagnoses   Name Primary?    Proximal muscle weakness     Poor posture     Decreased right shoulder range of motion      Physician: Maikel Redmond MD    Visit Date: 11/12/2020    Physician Orders: PT Eval and Treat  Medical Diagnosis from Referral: R shoulder pain  Evaluation Date: 8/31/2020  Authorization Period Expiration: 12/31/2020  Plan of Care Expiration: 11/23/2020  Visit # / Visits authorized: 12 (11 of 20)      Precautions: Standard, Diabetes and Bipolar disored, schizoaffective disorder    Time In: 7:30 am  Time Out: 8:15 am  Total Billable Time: 41 minutes    SUBJECTIVE     Pt reports: her shoulder has been hurting since with more intensity since yesterday due to running out of pain medication. States she has an appointment with pain management tomorrow.     Patient was compliant with home exercise program   Response to previous treatment: Felt good following last session   Functional change: Unable to perform home chores without pain.     Pre-Treatment Pain: 10/10  Post-Treatment Pain: 10/10  Location: R shoulder and scapular    TREATMENT     Yolanda received therapeutic exercises to develop strength, ROM, posture and core stabilization for 31 minutes including: (exercises performed today listed in bold)    Exercise    UBE 3 minutes forward/backward for strength and endurance    Rope and pulley  3 minutes each  Flexion and abduction   Upper trapezius stretch     Manual resisted elbow flexion and extension AROM     Shoulder flexion table slides     R shoulder PROM  Flexion, abduction, IR and ER, scaption, adduction  10 minutes    6 way shoulder isometrics  2 x 10, 2 sec hold each on R flexion, abduction, adduction, and extension     Bicep Curls  3#, 3 x 10, seated   Shoulder flexion and shoulder abduction with dowel 1# dowel, 3 x 10        Shoulder external rotation AAROM   2#, 2 x 10 on R   Seated scapular retractions Yellow band, 3 x 10    Side lying shoulder external rotation  3 x 10 on R 1 pound   Supine serratus punches  0# dowel, 3 x 10        Yolanda received the following manual therapy techniques: Joint mobilizations and Soft tissue Mobilization were applied to the: R shoulder for 10 minutes, including:  STM of right biceps brachii, deltoid, supraspinatus  and infraspinatus       Moist heat pack applied to R should at end of session for 0 minutes    Home Exercises Provided and Patient Education Provided     Education/Self-Care provided:    Patient educated on biomechanical justification for therapeutic exercise and importance of compliance with HEP in order to improve overall impairments and QOL    Patient educated on the importance of improved core and upper extremity strength in order to improve alignment of the spine and upper extremities with static positions and dynamic movement.    Educated on needing to complete shoulder rehab before addressing low back referral     Written Home Exercises Provided: yes.  Exercises were reviewed and Yolanda was able to demonstrate them prior to the end of the session.  Yolanda demonstrated good  understanding of the education provided.     See EMR under Patient Instructions for exercises provided 9/21/2020.    ASSESSMENT   Patient reports that her right shoulder feels looser after therapy session but no change in pain. Demonstrated full shoulder PROM with reports of a stretch but no pain at end range. Decreased resistance and exercises due to above complaints. All though patient reports 10/10 before/after therapy session, she has not tears and no facial expression of pain.       Yolanda is progressing well towards her goals.   Pt prognosis is Fair.     Pt will continue to benefit from skilled outpatient physical therapy to address the deficits listed in the problem list box on initial evaluation, provide pt/family education and to maximize pt's  level of independence in the home and community environment.     Pt's spiritual, cultural and educational needs considered and pt agreeable to plan of care and goals.     Anticipated Barriers for therapy: co-morbidities, sedentary lifestyle, chronicity of condition, lack of understanding of condition and adherence to treatment plan    GOALS:     Short Term Goals:  6 weeks     1. Pain: Pt will demonstrate improved pain by reports of less than or equal to 7/10 worst pain on the verbal rating scale in order to progress toward maximal functional ability and improve QOL.     2. Function: Patient will demonstrate improved function as indicated by a functional limitation score of less than or equal to 58 out of 100 on FOTO.     3. Mobility: Patient will improve AROM to 50% of stated goals, listed in objective measures above, in order to progress towards independence with functional activities.      4. Strength: Patient will improve strength to 50% of stated goals, listed in objective measures above, in order to progress towards independence with functional activities.      5. HEP: Patient will demonstrate independence with HEP in order to progress toward functional independence.        Long Term Goals:  12 weeks     1. Pain: Pt will demonstrate improved pain by reports of less than or equal to 5/10 worst pain on the verbal rating scale in order to progress toward maximal functional ability and improve QOL.       2. Function: Patient will demonstrate improved function as indicated by a functional limitation score of less than or equal to 48 out of 100 on FOTO.     3. Mobility: Patient will improve AROM to stated goals, listed in objective measures above, in order to return to maximal functional potential and improve quality of life.     4. Strength: Patient will improve strength to stated goals, listed in objective measures above, in order to improve functional independence and quality of life.     5. Patient will return to  normal ADL's, IADL's, community involvement, recreational activities, and work-related activities with less than or equal to 5/10 pain and maximal function.             PLAN   Continue Plan of Care to strengthen shoulder and scapular while maintaining range of motion allowing patient to be able to return to all functional activities with no pain.     Evaluation: 8/23/2020  Progress Note: 9/28/2020  Progress Note: 10/26/2020   Progress Note: 11/25/2020  POC Expiration: 11/23/2020     Raz Orozco PTA

## 2020-11-13 ENCOUNTER — HOSPITAL ENCOUNTER (OUTPATIENT)
Dept: RADIOLOGY | Facility: HOSPITAL | Age: 50
Discharge: HOME | End: 2020-11-13
Attending: PAIN MEDICINE
Payer: MEDICAID

## 2020-11-13 ENCOUNTER — OFFICE VISIT (OUTPATIENT)
Dept: PAIN MEDICINE | Facility: CLINIC | Age: 50
End: 2020-11-13
Payer: MEDICAID

## 2020-11-13 ENCOUNTER — TELEPHONE (OUTPATIENT)
Dept: RHEUMATOLOGY | Facility: CLINIC | Age: 50
End: 2020-11-13

## 2020-11-13 VITALS
DIASTOLIC BLOOD PRESSURE: 81 MMHG | HEART RATE: 94 BPM | BODY MASS INDEX: 47.76 KG/M2 | HEIGHT: 56 IN | WEIGHT: 212.31 LBS | SYSTOLIC BLOOD PRESSURE: 119 MMHG

## 2020-11-13 DIAGNOSIS — M54.9 DORSALGIA, UNSPECIFIED: ICD-10-CM

## 2020-11-13 DIAGNOSIS — M54.6 CHRONIC MIDLINE THORACIC BACK PAIN: ICD-10-CM

## 2020-11-13 DIAGNOSIS — M25.512 CHRONIC PAIN OF BOTH SHOULDERS: Primary | ICD-10-CM

## 2020-11-13 DIAGNOSIS — G89.29 CHRONIC MIDLINE THORACIC BACK PAIN: ICD-10-CM

## 2020-11-13 DIAGNOSIS — M25.559 HIP PAIN: ICD-10-CM

## 2020-11-13 DIAGNOSIS — M25.511 CHRONIC PAIN OF BOTH SHOULDERS: Primary | ICD-10-CM

## 2020-11-13 DIAGNOSIS — M54.50 CHRONIC BILATERAL LOW BACK PAIN WITHOUT SCIATICA: ICD-10-CM

## 2020-11-13 DIAGNOSIS — G89.29 CHRONIC BILATERAL LOW BACK PAIN WITHOUT SCIATICA: ICD-10-CM

## 2020-11-13 DIAGNOSIS — G89.29 CHRONIC PAIN OF BOTH SHOULDERS: Primary | ICD-10-CM

## 2020-11-13 PROCEDURE — 72100 X-RAY EXAM L-S SPINE 2/3 VWS: CPT | Mod: 26,,, | Performed by: RADIOLOGY

## 2020-11-13 PROCEDURE — 99204 PR OFFICE/OUTPT VISIT, NEW, LEVL IV, 45-59 MIN: ICD-10-PCS | Mod: S$PBB,,, | Performed by: PAIN MEDICINE

## 2020-11-13 PROCEDURE — 99999 PR PBB SHADOW E&M-EST. PATIENT-LVL V: CPT | Mod: PBBFAC,,, | Performed by: PAIN MEDICINE

## 2020-11-13 PROCEDURE — 73521 X-RAY EXAM HIPS BI 2 VIEWS: CPT | Mod: 26,,, | Performed by: RADIOLOGY

## 2020-11-13 PROCEDURE — 72070 XR THORACIC SPINE AP LATERAL: ICD-10-PCS | Mod: 26,,, | Performed by: RADIOLOGY

## 2020-11-13 PROCEDURE — 72100 XR LUMBAR SPINE AP AND LATERAL: ICD-10-PCS | Mod: 26,,, | Performed by: RADIOLOGY

## 2020-11-13 PROCEDURE — 99999 PR PBB SHADOW E&M-EST. PATIENT-LVL V: ICD-10-PCS | Mod: PBBFAC,,, | Performed by: PAIN MEDICINE

## 2020-11-13 PROCEDURE — 73521 XR HIPS BILATERAL 2 VIEW INCL AP PELVIS: ICD-10-PCS | Mod: 26,,, | Performed by: RADIOLOGY

## 2020-11-13 PROCEDURE — 99215 OFFICE O/P EST HI 40 MIN: CPT | Mod: PBBFAC,25 | Performed by: PAIN MEDICINE

## 2020-11-13 PROCEDURE — 99204 OFFICE O/P NEW MOD 45 MIN: CPT | Mod: S$PBB,,, | Performed by: PAIN MEDICINE

## 2020-11-13 PROCEDURE — 73521 X-RAY EXAM HIPS BI 2 VIEWS: CPT | Mod: TC

## 2020-11-13 PROCEDURE — 72100 X-RAY EXAM L-S SPINE 2/3 VWS: CPT | Mod: TC

## 2020-11-13 PROCEDURE — 72070 X-RAY EXAM THORAC SPINE 2VWS: CPT | Mod: 26,,, | Performed by: RADIOLOGY

## 2020-11-13 PROCEDURE — 72070 X-RAY EXAM THORAC SPINE 2VWS: CPT | Mod: TC

## 2020-11-13 RX ORDER — TRAMADOL HYDROCHLORIDE 50 MG/1
50 TABLET ORAL EVERY 8 HOURS PRN
Qty: 90 TABLET | Refills: 1 | Status: SHIPPED | OUTPATIENT
Start: 2020-11-13 | End: 2020-12-19

## 2020-11-13 NOTE — LETTER
November 13, 2020      Abilio Gibbs MD  [address] [place] [address] [address] [place] [address]           [facility]  [address] [facility]  [place] [address]  Phone: [phone]  Fax: [phone]          Patient: Yolanda Betts   MR Number: [id number]   YOB: 1970   Date of Visit: 11/13/2020       Dear Dr. Abilio Gibbs:    Thank you for referring Yolanda Betts to me for evaluation. Attached you will find relevant portions of my assessment and plan of care.    If you have questions, please do not hesitate to call me. I look forward to following Yolanda Betts along with you.    Sincerely,    Harlan Scanlon MD    Enclosure  CC:  No Recipients    If you would like to receive this communication electronically, please contact [email or web address] or [phone] to request more information on [employer] access.    For providers and/or their staff who would like to refer a patient to [facility], please contact us through our one-stop-shop provider referral line, [facility] , at [phone].    If you feel you have received this communication in error or would no longer like to receive these types of communications, please e-mail [email or web address]

## 2020-11-13 NOTE — PATIENT INSTRUCTIONS
-will start tramadol 50 mg tablets 3 times daily only as needed  -obtain bilateral hip x-rays today, lumbar spine x-ray today, thoracic spine x-ray today  -a patient follow up in clinic in 6 weeks

## 2020-11-13 NOTE — PROGRESS NOTES
Chief Pain Complaint:  Back Pain    Referring Provider  Dr. Abilio Gibbs    This note was created using voice recognition, there may be errors that were missed during proofreading.    History of Present Illness:   This patient is a 50 y.o. female who presents today complaining of the above noted pain/s. The patient describes the pain as follows.  Ms. Betts his new patient clinic with complaints of bilateral shoulders, bilateral hips, thoracic and lumbar spine pain.  She has a history of fibromyalgia reports that this pain feels different from the fibromyalgia.  She has been diagnosed with fibroid for approximately 10 years has been having these joint complaints for approximately last 5 years.  She describes a sharp throbbing sensation which is worse with movement and she has been unable find anything that provides pain relief.  She has tried numerous different medications including Aleve, Tylenol, Robaxin, tizanidine, Advil, gabapentin, Flexeril, Lyrica, Cymbalta, baclofen in addition to currently participating in physical therapy.  She has had shoulder joint injections in the past which helped for short period of time but denies having had hip injections.  She denies having had surgery on any of these joints.  She reports that anti-inflammatory medications cause her blood pressure to increase therefore she has to avoid them.    Previous Therapy:  Medications:  Aleve, Advil, gabapentin, Lyrica, Cymbalta, Tylenol, baclofen, tizanidine, Flexeril, Robaxin  Injections: Shoulder Joint Injection  Surgeries: None  Physical Therapy: Currently Participating    Past Surgical History:   Procedure Laterality Date    abcess removed right back      BELT ABDOMINOPLASTY      BREAST SURGERY Bilateral 1996    Reduction     SECTION      X 2    COLONOSCOPY      COLONOSCOPY N/A 2018    Procedure: colonoscopy for iron deficiency anemia;  Surgeon: Frankie Sanchez MD;  Location: [facility];  Service:  "Endoscopy;  Laterality: N/A;    COLONOSCOPY N/A 2/28/2018    Procedure: COLONOSCOPY;  Surgeon: Frankie Sanchez MD;  Location: [facility] ENDO;  Service: Endoscopy;  Laterality: N/A;    HYSTERECTOMY      w/ BSO; hypermenorrhea    MOUTH SURGERY  1996    OOPHORECTOMY      hyst/bso, hypermenorrhea    ROBOT-ASSISTED CHOLECYSTECTOMY USING DA DARI XI N/A 1/3/2020    Procedure: XI ROBOTIC CHOLECYSTECTOMY;  Surgeon: Damir Driscoll MD;  Location: [facility] OR;  Service: General;  Laterality: N/A;    TOTAL REDUCTION MAMMOPLASTY      TUBAL LIGATION       Family History   Problem Relation Age of Onset    Diabetes Mother     Hyperlipidemia Mother     Hypertension Mother     Asthma Mother     COPD Mother     Glaucoma Mother     Thyroid disease Mother     Anesthesia problems Mother         "almost had a cardiac arrest" , blood clots    Hypertension Father     Hyperlipidemia Father     Cancer Father         Brain, lung, liver, kidney    Heart disease Maternal Grandmother     Hyperlipidemia Maternal Grandmother     Hypertension Maternal Grandmother     Cataracts Maternal Grandmother     Diabetes Maternal Grandmother     Heart disease Maternal Grandfather     Hyperlipidemia Maternal Grandfather     Hypertension Maternal Grandfather     Glaucoma Maternal Grandfather     Cancer Maternal Grandfather     Cataracts Maternal Grandfather     Macular degeneration Maternal Grandfather     Diabetes Maternal Grandfather     Heart disease Paternal Grandmother     Hyperlipidemia Paternal Grandmother     Hypertension Paternal Grandmother     Cataracts Paternal Grandmother     Heart disease Paternal Grandfather     Hyperlipidemia Paternal Grandfather     Hypertension Paternal Grandfather     Cataracts Paternal Grandfather     Breast cancer Maternal Cousin     Breast cancer Maternal Cousin     Breast cancer Maternal Cousin     Breast cancer Maternal Cousin      Social History     Socioeconomic History    " "Marital status: Single     Spouse name: Not on file    Number of children: Not on file    Years of education: Not on file    Highest education level: Not on file   Occupational History    Not on file   Social Needs    Financial resource strain: Not on file    Food insecurity     Worry: Not on file     Inability: Not on file    Transportation needs     Medical: Not on file     Non-medical: Not on file   Tobacco Use    Smoking status: Never Smoker    Smokeless tobacco: Never Used   Substance and Sexual Activity    Alcohol use: Yes     Alcohol/week: 0.0 standard drinks     Comment: socially  No alcohol 72h prior to sx    Drug use: No    Sexual activity: Yes     Partners: Male   Lifestyle    Physical activity     Days per week: Not on file     Minutes per session: Not on file    Stress: Not on file   Relationships    Social connections     Talks on phone: Not on file     Gets together: Not on file     Attends Sikh service: Not on file     Active member of club or organization: Not on file     Attends meetings of clubs or organizations: Not on file     Relationship status: Not on file   Other Topics Concern    Not on file   Social History Narrative    Long-term care nurse      Imaging / Labs / Studies (reviewed on 11/13/2020):    Review of Systems:  Review of Systems   Constitutional: Negative for fever.   Eyes: Negative for blurred vision.   Respiratory: Negative for cough and wheezing.    Cardiovascular: Negative for chest pain and orthopnea.   Gastrointestinal: Negative for constipation, diarrhea, nausea and vomiting.   Genitourinary: Negative for dysuria.   Musculoskeletal: Positive for back pain and joint pain.   Skin: Negative for itching and rash.   Neurological: Positive for weakness.   Endo/Heme/Allergies: Does not bruise/bleed easily.       Physical Exam:  /81   Pulse 94   Ht 4' 7.5" (1.41 m)   Wt 96.3 kg (212 lb 4.9 oz)   BMI 48.46 kg/m²  (reviewed on " 11/13/2020)\  General    Constitutional: She is oriented to person, place, and time. She appears well-developed and well-nourished.   HENT:   Head: Normocephalic and atraumatic.   Eyes: EOM are normal.   Neck: Neck supple.   Cardiovascular: Normal rate.    Pulmonary/Chest: Effort normal.   Abdominal: She exhibits no distension.   Neurological: She is alert and oriented to person, place, and time. No cranial nerve deficit.   Psychiatric: She has a normal mood and affect.             Right Hip Exam     Range of Motion   External rotation: abnormal   Internal rotation: abnormal     Comments:  Severe pain with internal and external rotation of the hip  Left Hip Exam     Range of Motion   External rotation: abnormal   Internal rotation: abnormal     Comments:  Severe pain with internal and external rotation of the hip      Back (L-Spine & T-Spine) / Neck (C-Spine) Exam     Comments:  Tender to palpation over thoracic and lumbar paraspinous muscles and facets  Right Shoulder Exam     Tests & Signs   Moseley test: positive  Impingement: positive    Comments:  Severe pain with light palpation of the shoulder joint anteriorly and posteriorly; limited range of motion secondary increase in pain    Left Shoulder Exam     Tests & Signs   Moseley test: positive  Impingement: positive    Comments:  Severe pain with light palpation of the shoulder joint anteriorly and posteriorly; limited range of motion secondary to increase in pain      Muscle Strength   Right Upper Extremity   Biceps: 4/5   Deltoid:  4/5  Triceps:  4/5  Left Upper Extremity  Biceps: 4/5   Deltoid:  4/5  Triceps:  4/5  Right Lower Extremity   Hip Flexion: 4/5   Ankle Dorsiflexion:  4/5   Left Lower Extremity   Hip Flexion: 4/5   Ankle Dorsiflexion:  4/5       Assessment  Shoulder Pain  Hip OA    1. 50 y.o. year old patient with PMH of   Past Medical History:   Diagnosis Date    Abnormal Pap smear of cervix     HPV genital warts    Anemia     Anxiety      Arthritis     Asthma 10/11/2016    Bipolar 1 disorder     Diabetes mellitus, type 2     Dyslipidemia associated with type 2 diabetes mellitus 5/13/2019    Genital warts     GERD (gastroesophageal reflux disease)     Herpes simplex virus (HSV) infection     Hyperlipidemia     Hypertension     Hypertension complicating diabetes 5/5/2019    Migraine with aura and without status migrainosus, not intractable 3/21/2016    Mild persistent asthma without complication 10/11/2016    Morbid obesity with body mass index (BMI) of 45.0 to 49.9 in adult 8/3/2017    Obstructive sleep apnea     ALEN (obstructive sleep apnea)     2L per N/C q HS    Schizoaffective disorder, bipolar type 4/25/2019    Seasonal allergic rhinitis due to pollen 5/13/2019    Type 2 diabetes mellitus with hyperglycemia, with long-term current use of insulin 12/21/2017    Type 2 diabetes mellitus with hyperglycemia, without long-term current use of insulin 12/21/2017      presenting with pain located bilateral shoulders, bilateral hips  2. Pain Generators / Etiology: Shoulder Osteoarthritis and Hip Osteoarthritis  3. Failed Meds (E- Effective, NE- Not Effective):  NSAIDs-elevated blood pressure, tramadol-effective, gabapentin-effective, Lyrica-not effective  4. Physical Therapy - Currently Participating  5. Psychological comorbidities - bipolar 1 disorder, schizoaffective disorder, anxiety  6. Anticoagulants / Antiplatelets: Aspirin 81mg     PLAN:  1. Medications:  Will start tramadol 50 mg tablets 3 times daily as needed; continue all other medications as prescribed   2. PT - patient is currently participating physical therapy and will continue exercising as tolerated  3. Psychological - continue Abilify 10 mg tablets as prescribed, continue Klonopin 1 mg tablet as prescribed  4. Labs - obtain  none  5. Imaging - obtain thoracic and lumbar x-rays, bilateral hip x-rays today  6. Interventions - schedule none; can consider hip injections in  the future  7. Referrals - none  8. Records - none  9. Follow up visit - with patient follow up in clinic in 6 weeks  10. Patient Questions - answered all of the patient's questions regarding diagnosis, therapy, and treatment  11.  This condition does not require this patient to take time off of work    DOMINIK Scanlon MD  Interventional Pain  [facility] [place]

## 2020-11-16 ENCOUNTER — PATIENT MESSAGE (OUTPATIENT)
Dept: INTERNAL MEDICINE | Facility: CLINIC | Age: 50
End: 2020-11-16

## 2020-11-17 ENCOUNTER — TELEPHONE (OUTPATIENT)
Dept: PAIN MEDICINE | Facility: CLINIC | Age: 50
End: 2020-11-17

## 2020-11-17 NOTE — TELEPHONE ENCOUNTER
----- Message from Ceci Lala sent at 11/17/2020  2:57 PM CST -----  Regarding: medication  Pt is requesting call back in regards to medication not being effective and would like to know if she need to make an appt            Pls call back at [phone]

## 2020-11-17 NOTE — TELEPHONE ENCOUNTER
Pt can add an Extra Strength Tylenol with the Tramadol to see if this will give her any relief pt will follow up with clinic on Friday//dp

## 2020-11-20 ENCOUNTER — TELEPHONE (OUTPATIENT)
Dept: GASTROENTEROLOGY | Facility: CLINIC | Age: 50
End: 2020-11-20

## 2020-11-20 ENCOUNTER — TELEPHONE (OUTPATIENT)
Dept: PAIN MEDICINE | Facility: CLINIC | Age: 50
End: 2020-11-20

## 2020-11-20 ENCOUNTER — TELEPHONE (OUTPATIENT)
Dept: DIABETES | Facility: CLINIC | Age: 50
End: 2020-11-20

## 2020-11-20 NOTE — TELEPHONE ENCOUNTER
----- Message from Reba Dominguez sent at 11/20/2020  9:03 AM CST -----  Type:  Needs Medical Advice    Who Called:  Pt  Yolanda   Symptoms (please be specific):   States Dr prescribed a pain med and it is not relieving the pain at all   How long has patient had these symptoms:     Pharmacy name and phone #:   [facility]  Would the patient rather a call back or a response via [facility]?  Call back   Best Call Back Number:   [phone]  Additional Information:  She is needing a different medication//please call//isamar/micheal

## 2020-11-20 NOTE — TELEPHONE ENCOUNTER
Patient contacted regarding low BG, she understands to hold Novolog If not eating. Fasting Bg 163, she at at 8 am, Bg @ 10 am 207. Advised her to continue her current medications. She reports decreased diet due to GI upset, she has a hx of IBS- constipation, has had diarrhea as a result. Managed by GI, I advised her to contact GI and also stay hydrated

## 2020-11-20 NOTE — TELEPHONE ENCOUNTER
----- Message from Josselyn Stringer sent at 11/20/2020 10:24 AM CST -----  Regarding: IBS  Contact: patient  Patient states that she is experiencing constipation and Diarrhea at the same time and its effecting her blood sugars, patient would like to be advised, please call her back at [phone]

## 2020-11-20 NOTE — TELEPHONE ENCOUNTER
----- Message from Reba Dominguez sent at 11/20/2020  9:05 AM CST -----  Type:  Needs Medical Advice    Who Called:  Pt  Yolanda   Symptoms (please be specific):   States she had to call EMS last night because her blood sugar had dropped to 63//she does not feel go today//she feels weak    How long has patient had these symptoms:     Pharmacy name and phone #:     Would the patient rather a call back or a response via [facility]?   Call back   Best Call Back Number:    [phone]  Additional Information:   please call//thanks/[facility]

## 2020-11-20 NOTE — TELEPHONE ENCOUNTER
Pt said around 12 last night, her BG dropped to 63 and she had to call EMS, but was not taken to the hospital. Hypoglycemia was treated with pineapple juice. Pt stated she had taken 30 units of Novolog, but had not eaten. Advised patient not to take Novolog if she's not going to eat, and she voiced understanding. BG was 163 before breakfast this AM, and patient took 30 units of Novolog. Said she still feels weak. Please advise.

## 2020-11-20 NOTE — TELEPHONE ENCOUNTER
Pt sent another message stating that her medication is not helping with her pain even after she added the tylenol along with the tramadol.  Was able to move pt appointment from the 12/24/2020 to 12/10/2020   Pt states she is not able to function with this pain the gabapentin did give her some relief.  All questions answered//dp

## 2020-11-20 NOTE — TELEPHONE ENCOUNTER
Patient informed that I tried to reach her on 11/6 to discuss recurring issue of constipation as she was having issue with medication amitiza at that time. Patient informed that provider suggest repeat colonoscopy or sitz marker.     Patient scheduled for sitz maker test for Monday 11/23.

## 2020-11-21 ENCOUNTER — NURSE TRIAGE (OUTPATIENT)
Dept: ADMINISTRATIVE | Facility: CLINIC | Age: 50
End: 2020-11-21

## 2020-11-21 NOTE — TELEPHONE ENCOUNTER
Reason for Disposition   [1] Low blood sugar symptoms persist > 30 minutes AND [2] using low blood sugar Care Advice    Additional Information   Negative: Unconscious or difficult to awaken   Negative: Seizure occurs   Negative: Acting confused (e.g., disoriented, slurred speech)   Negative: Very weak (e.g., can't stand)   Negative: Sounds like a life-threatening emergency to the triager   Negative: [1] Vomiting AND [2] signs of dehydration (e.g., very dry mouth, lightheaded, dark urine)    Prot[facility] used[facility]    Patient with low blood glucose of 50 prior to call and 65 while on call with me.  Patient reports she is shaking and weak.  Patient reports eating dinner and eating a popcycle. Patient does not have any glucose tablets or gel available. Patient advised to go to the ED.  Patient called for advice on what medications she should take tonight.  Patient advised per protocol to be evaluated in the ED.

## 2020-11-22 ENCOUNTER — PATIENT MESSAGE (OUTPATIENT)
Dept: INTERNAL MEDICINE | Facility: CLINIC | Age: 50
End: 2020-11-22

## 2020-11-22 ENCOUNTER — NURSE TRIAGE (OUTPATIENT)
Dept: ADMINISTRATIVE | Facility: CLINIC | Age: 50
End: 2020-11-22

## 2020-11-22 NOTE — TELEPHONE ENCOUNTER
Glucose Recheck =171@0938     Takes 30 units of Novolog 3 times a day   94 units of tujeo at bedtime(skipped this last night)  Takes glimepiride 4 mg prior to breakfast and metformin 1000mg BID    Bydureon 2 mg every 7 days- due today.   DrMuratore--She can adjust those units but honestly these readings arent that bad. Except 65. If morning sugar is low dont take glimeperide. Call tomorrowI can send sliding scale   Gave pt MD instructions and [facility] and verbalizes understanding all instructions.

## 2020-11-22 NOTE — TELEPHONE ENCOUNTER
duplicate    Reason for Disposition   Caller has already spoken with another triager and has no further questions.    Protocols used: NO CONTACT OR DUPLICATE CONTACT CALL-A-AH

## 2020-11-22 NOTE — TELEPHONE ENCOUNTER
Spoke with pt:   At bedtime last night glucose was 95. Did eat at bedtime snack and skipped bedtime insulin.-snack: bowl of oatmeal with sugar and walnuts with milk. Woke at [place]- glucose close 97.  Glucose=69 before eating. Pt has pineapple juice is to drink 4 ounces and then make breakfast and I instructed pt I will call her back to check on her. Pt verbalizes understanding.     Reason for Disposition   [1] Low blood glucose (< 70 mg/dL  or 3.9 mmol/L) with no other adult present AND [2] hasn't tried Care Advice    Additional Information   Negative: Unconscious or difficult to awaken   Negative: Seizure occurs   Negative: Acting confused (e.g., disoriented, slurred speech)   Negative: Very weak (e.g., can't stand)   Negative: Sounds like a life-threatening emergency to the triager   Negative: [1] Vomiting AND [2] signs of dehydration (e.g., very dry mouth, lightheaded, dark urine)   Negative: [1] Low blood sugar symptoms persist > 30 minutes AND [2] using low blood sugar Care Advice   Negative: [1] Low blood glucose (< 70 mg/dL  or 3.9 mmol/L) persists > 30 minutes AND [2] using low blood sugar Care Advice   Negative: [1] Low blood sugar symptoms with no other adult present AND [2] hasn't tried Care Advice   Negative: Patient sounds very sick or weak to the triager    Protocols used: [facility]

## 2020-11-23 ENCOUNTER — TELEPHONE (OUTPATIENT)
Dept: PAIN MEDICINE | Facility: CLINIC | Age: 50
End: 2020-11-23

## 2020-11-23 ENCOUNTER — CLINICAL SUPPORT (OUTPATIENT)
Dept: GASTROENTEROLOGY | Facility: CLINIC | Age: 50
End: 2020-11-23
Payer: MEDICAID

## 2020-11-23 ENCOUNTER — TELEPHONE (OUTPATIENT)
Dept: DIABETES | Facility: CLINIC | Age: 50
End: 2020-11-23

## 2020-11-23 NOTE — TELEPHONE ENCOUNTER
Contacted pt. Pt states at last visit 11/13/2020,  offered injection. Pt would like to schedule appt. Offered pt appt 12/2/20 to coordinate with obgyn appt here at the [place]. Pt gladly accepted. All questions answered.//lp

## 2020-11-23 NOTE — TELEPHONE ENCOUNTER
Called patient back. Advise patient to decrease Novolog from 30 units to 25 units before meals per provider request. She verbalized understanding.       ----- Message from Ronal Pfeiffer MA sent at 11/23/2020  3:55 PM CST -----  Regarding: Low blood sugar  States she is running very low (71) she thinks her sliding scale needs to be adjusted because she keeps running low.

## 2020-11-23 NOTE — PROGRESS NOTES
Pt  Came in for Sitz marker ordered by Mojgan Franklin. Pt swallowed without difficulty. XRAYS scheduled.  Lot # ([id number]  Exp   06/22

## 2020-11-23 NOTE — TELEPHONE ENCOUNTER
Called patient back to advise her not to take Glimepiride if she is not eating. Advise her that it is to be taken before meals only. Advise pt I will schedule a nurse visit in a week, will call her to review low readings. She verbalized understanding.     ----- Message from Elizabeth Ceron NP sent at 11/23/2020  9:27 AM CST -----  Regarding: RE: Call back  Contact: Patient  If she is not eating, she needs to help her Glimepiride. This medication is given before meals only. Advise her to try this and schedule a nurse visit in 1 week to review low readings    ----- Message -----  From: Renato MCCARTNEY LPN  Sent: 11/23/2020   9:19 AM CST  To: Elizabeth Ceron NP  Subject: FW: Call back                                    I called her back. She had to go to the ER the other day for low blood sugar. 50. She is currently 96 right now, fasting. She has no appetite. Medication: Metformin 1000 mg, Toujeo 94 units pm, Bydureon, Novolog 30 units before meals, Glimiperide 4mg.   ----- Message -----  From: Kemi Mcgee  Sent: 11/23/2020   8:57 AM CST  To: [facility] Staff  Subject: Call back                                        Patient would like the nurse to give her a call back concerning her blood sugar levels, and her recent ER visit  at  [phone] (home)

## 2020-11-23 NOTE — TELEPHONE ENCOUNTER
I think that this issue has been resolved/completed. Please check and let me know if any further action is needed from me. Otherwise, you may laquita this item as done. Thanks.    Future Appointments   Date Time Provider Department Center   12/1/2020 10:00 AM DIABETES MANAGEMENT NURSE, [facility]    12/14/2020  2:00 PM [facility]   1/14/2021  1:30 PM [facility]ara Sanchez RD[facility] [place]   1/14/2021  2:40 PM DOMINIK Beach MD [facility] [place]

## 2020-11-23 NOTE — TELEPHONE ENCOUNTER
----- Message from Vanessa Mares sent at 11/23/2020  3:32 PM CST -----  Regarding: call was disconnected  Contact: pt  Caller is requesting a call back regarding a call back due to disconnection.  Please call back at [phone] (iqyc)

## 2020-11-24 ENCOUNTER — HOSPITAL ENCOUNTER (OUTPATIENT)
Dept: RADIOLOGY | Facility: HOSPITAL | Age: 50
Discharge: HOME | End: 2020-11-24
Attending: PHYSICIAN ASSISTANT
Payer: MEDICAID

## 2020-11-24 DIAGNOSIS — K59.00 CONSTIPATION, UNSPECIFIED CONSTIPATION TYPE: ICD-10-CM

## 2020-11-24 DIAGNOSIS — R10.9 ABDOMINAL PAIN, UNSPECIFIED ABDOMINAL LOCATION: ICD-10-CM

## 2020-11-24 DIAGNOSIS — R14.0 BLOATING: ICD-10-CM

## 2020-11-24 PROCEDURE — 74018 XR ABDOMEN AP 1 VIEW: ICD-10-PCS | Mod: 26,,, | Performed by: RADIOLOGY

## 2020-11-24 PROCEDURE — 74018 RADEX ABDOMEN 1 VIEW: CPT | Mod: 26,,, | Performed by: RADIOLOGY

## 2020-11-24 PROCEDURE — 74018 RADEX ABDOMEN 1 VIEW: CPT | Mod: TC

## 2020-11-25 ENCOUNTER — NURSE TRIAGE (OUTPATIENT)
Dept: ADMINISTRATIVE | Facility: CLINIC | Age: 50
End: 2020-11-25

## 2020-11-25 RX ORDER — GLUCAGON 1 MG
1 VIAL (EA) INJECTION
Qty: 1 EACH | Refills: 1 | Status: SHIPPED | OUTPATIENT
Start: 2020-11-25 | End: 2020-12-03 | Stop reason: SDUPTHER

## 2020-11-25 RX ORDER — INSULIN ASPART 100 [IU]/ML
20 INJECTION, SOLUTION INTRAVENOUS; SUBCUTANEOUS
Qty: 15 ML | Refills: 0
Start: 2020-11-25 | End: 2020-12-14 | Stop reason: SDUPTHER

## 2020-11-25 NOTE — TELEPHONE ENCOUNTER
For documentation purposes, addressed via [place]   Patient contacted at 3pm due to low bg, patient is oriented and speech is clear, she was advised by the triage nurse to call 911 due to low bg and confusion, she did not call 911, she contacted her friend to pick her up from work. She reports eating breakfast and drinking orange juice for breakfast, BG at ~1:30 was 56, she did not take Novolog, she took Glimepiride 4 mg before breakfas t and Toujeo 94 units last night. Her current bg is 164, she ate a candy bar and spaghetti for lunch.     Plan:  - discontinue Glimepiride   - continue Toujeo 94 units ( denies fasting hypo)  - reduce Novolog to a max dose of 20 units if bg before meal is > 200, no sliding scale  - advised to  glucose tablets   - glucagon prescribed with instructions, she had someone to admin if needed   - call back if no impro vement/worsening

## 2020-11-25 NOTE — TELEPHONE ENCOUNTER
Pt reports her sugar has been running low for two weeks. Reports currently 56. Reports feeling disoriented. Advised, per protocol to dial 911, now. She verbalizes understanding.    Reason for Disposition   Acting confused (e.g., disoriented, slurred speech)    Protocols used: DIABETES - LOW BLOOD SUGAR-A-[place]

## 2020-11-27 DIAGNOSIS — E11.69 TYPE 2 DIABETES MELLITUS WITH OTHER SPECIFIED COMPLICATION, WITH LONG-TERM CURRENT USE OF INSULIN: ICD-10-CM

## 2020-11-27 DIAGNOSIS — Z79.4 TYPE 2 DIABETES MELLITUS WITH OTHER SPECIFIED COMPLICATION, WITH LONG-TERM CURRENT USE OF INSULIN: ICD-10-CM

## 2020-11-27 RX ORDER — BLOOD-GLUCOSE CONTROL, NORMAL
EACH MISCELLANEOUS
Qty: 100 EACH | Refills: 11 | Status: SHIPPED | OUTPATIENT
Start: 2020-11-27 | End: 2022-01-18 | Stop reason: SDUPTHER

## 2020-11-27 RX ORDER — INSULIN GLARGINE 300 U/ML
INJECTION, SOLUTION SUBCUTANEOUS
Qty: 12 ML | Refills: 0 | Status: SHIPPED | OUTPATIENT
Start: 2020-11-27 | End: 2020-12-14 | Stop reason: SDUPTHER

## 2020-12-01 ENCOUNTER — OFFICE VISIT (OUTPATIENT)
Dept: DIABETES | Facility: CLINIC | Age: 50
End: 2020-12-01
Payer: MEDICAID

## 2020-12-01 ENCOUNTER — TELEPHONE (OUTPATIENT)
Dept: OBSTETRICS AND GYNECOLOGY | Facility: CLINIC | Age: 50
End: 2020-12-01

## 2020-12-01 ENCOUNTER — TELEPHONE (OUTPATIENT)
Dept: ORTHOPEDICS | Facility: CLINIC | Age: 50
End: 2020-12-01

## 2020-12-01 PROCEDURE — 99211 OFF/OP EST MAY X REQ PHY/QHP: CPT | Mod: 95,,, | Performed by: NURSE PRACTITIONER

## 2020-12-01 PROCEDURE — 99211 PR OFFICE/OUTPT VISIT, EST, LEVL I: ICD-10-PCS | Mod: 95,,, | Performed by: NURSE PRACTITIONER

## 2020-12-01 NOTE — PROGRESS NOTES
Established Patient - Audio Only Telehealth Visit     The patient location is: home  The chief complaint leading to consultation is: DM follow up/hyoglycemia  Visit type: Virtual visit with audio only (telephone)  Total time spent with patient: 7 mins       The reason for the audio only service rather than synchronous audio and video virtual visit was related to technical difficulties or patient preference/necessity.     Each patient to whom I provide medical services by telemedicine is:  (1) informed of the relationship between the physician and patient and the respective role of any other health care provider with respect to management of the patient; and (2) notified that they may decline to receive medical services by telemedicine and may withdraw from such care at any time. Patient verbally consented to receive this service via voice-only telephone call.       HPI: Yolanda Betts is a 50 y.o. Black or  female presenting for a new consult for diabetes. Patient has been diagnosed with diabetes for > 10 years.     Complications related to diabetes:  HTN, Hyperlipidemia, peripheral neuropathy.   Other pertinent history: denies Pancreatitis; denies Gastroparesis; denies DKA; denies Hx/family Hx of MEN2/MTC; reports Frequent UTIs/yeast infections     Past failed treatment include:  Jardiance- multiple yeast infections; Victoza - GI upset     Blood glucose testing is performed regularly, > 150-194  Fasting- this has increased since discontinuing CARIAS , < 180 PP. Denies hypoglycemia since discontinuing CARIAS; CGM - No     Compliance:The patient reports medication compliance all of the time and diet compliance most of the time.     Assessment and plan:    - Type 2 DM, with long term insulin use, with complications  - follow up in 2 weeks as scheduled    Diabetes Medications             exenatide microspheres (BYDUREON) 2 mg/0.65 mL PnIj Inject 2 mg into the skin every 7 days. CONTINUE    glucagon, human  recombinant, (GLUCAGON EMERGENCY KIT, HUMAN,) 1 mg SolR Inject 1 mg into the muscle as needed. Emergency use         insulin aspart U-100 (NOVOLOG FLEXPEN U-100 INSULIN) 100 unit/mL (3 mL) InPn pen Inject 20 Units into the skin 3 (three) times daily before meals. If BG > 200  before meal CONTINUE    metFORMIN (GLUCOPHAGE) 1000 MG tablet Take 1 tablet (1,000 mg total) by mouth 2 (two) times daily with meals. CONTINUE     TOUJEO MAX U-300 SOLOSTAR 300 unit/mL (3 mL) InPn Inject 94 units once daily at bedtime TITRATE TO GOAL 2 UNITS EVERY 3 DAYS UNTIL FASTING            This service was not originating from a related E/M service provided within the previous 7 days nor will  to an E/M service or procedure within the next 24 hours or my soonest available appointment.  Prevailing standard of care was able to be met in this audio-only visit.

## 2020-12-01 NOTE — TELEPHONE ENCOUNTER
----- Message from Holli Nash sent at 12/1/2020  8:46 AM CST -----  Patient is calling in regards to her appointment she needs to reschedule 12/2 at 3:15PM.       Contact information: [phone]

## 2020-12-01 NOTE — TELEPHONE ENCOUNTER
Spoke to the patient an was able to get her scheduled for her EMG with Dr. Lyons on 12/11 an then her follow up appointment with Dr. Roth to review the results 12/15/2020 Patient verbalized understanding.       ----- Message from Holli Nash sent at 12/1/2020  8:44 AM CST -----  .Type:  Needs Medical Advice    Who Called:PILO YANCEY  Symptoms (please be specific):   How long has patient had these symptoms:   Pharmacy name and phone #:   Would the patient rather a call back or a response via [facility]? Call   Best Call Back Number:.[phone]   Additional Information:Patient is requesting a call back. She stated she needs to take a test before she schedules another appt and she had some more questions

## 2020-12-03 ENCOUNTER — PATIENT OUTREACH (OUTPATIENT)
Dept: OTHER | Facility: OTHER | Age: 50
End: 2020-12-03

## 2020-12-03 RX ORDER — GLUCAGON 1 MG
1 VIAL (EA) INJECTION
Qty: 1 EACH | Refills: 1 | Status: SHIPPED | OUTPATIENT
Start: 2020-12-03 | End: 2021-12-03

## 2020-12-08 DIAGNOSIS — E26.9 HYPERALDOSTERONISM: ICD-10-CM

## 2020-12-08 DIAGNOSIS — E87.5 HYPERKALEMIA: ICD-10-CM

## 2020-12-08 DIAGNOSIS — I10 ESSENTIAL HYPERTENSION: Chronic | ICD-10-CM

## 2020-12-08 RX ORDER — FUROSEMIDE 20 MG/1
20 TABLET ORAL DAILY
Qty: 30 TABLET | Refills: 11 | Status: SHIPPED | OUTPATIENT
Start: 2020-12-08 | End: 2021-12-23 | Stop reason: SDUPTHER

## 2020-12-09 ENCOUNTER — TELEPHONE (OUTPATIENT)
Dept: INTERNAL MEDICINE | Facility: CLINIC | Age: 50
End: 2020-12-09

## 2020-12-09 ENCOUNTER — TELEPHONE (OUTPATIENT)
Dept: PAIN MEDICINE | Facility: CLINIC | Age: 50
End: 2020-12-09

## 2020-12-09 ENCOUNTER — PATIENT OUTREACH (OUTPATIENT)
Dept: PULMONOLOGY | Facility: CLINIC | Age: 50
End: 2020-12-09

## 2020-12-09 NOTE — TELEPHONE ENCOUNTER
Chronic Disease Management  Called patient to complete Pulmonary Disease Management Questionnaire.    Patient reports experiencing dyspnea with exertion as usual, at baseline. Patient reports improvement in nocturnal waking and coughing episodes since the initiation of Symbicort. Patient reports taking albuterol nebulizer treatments once every two weeks. Reviewed prescribed frequency of albuterol nebulizer medication. Encouraged patient to utilize medication as prescribed.   Patient stated understanding. Advised patient to contact PDM with any further needs.     Time  spent with patient:  Minutes
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
## 2020-12-09 NOTE — TELEPHONE ENCOUNTER
----- Message from Patricia Wilkerson sent at 12/9/2020  2:01 PM CST -----  Contact: Yolanda Mathew called to question if she can take Baclofen that was prescribed by ER physician from [facility] 12/08. Please call back at [phone]. Thanks jh
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
## 2020-12-10 ENCOUNTER — PATIENT MESSAGE (OUTPATIENT)
Dept: ORTHOPEDICS | Facility: CLINIC | Age: 50
End: 2020-12-10

## 2020-12-10 ENCOUNTER — TELEPHONE (OUTPATIENT)
Dept: DIABETES | Facility: CLINIC | Age: 50
End: 2020-12-10

## 2020-12-10 ENCOUNTER — OFFICE VISIT (OUTPATIENT)
Dept: PAIN MEDICINE | Facility: CLINIC | Age: 50
End: 2020-12-10
Payer: MEDICAID

## 2020-12-10 VITALS
HEIGHT: 55 IN | WEIGHT: 212.31 LBS | BODY MASS INDEX: 49.13 KG/M2 | HEART RATE: 97 BPM | SYSTOLIC BLOOD PRESSURE: 124 MMHG | DIASTOLIC BLOOD PRESSURE: 82 MMHG

## 2020-12-10 DIAGNOSIS — M54.50 CHRONIC BILATERAL LOW BACK PAIN WITHOUT SCIATICA: Primary | ICD-10-CM

## 2020-12-10 DIAGNOSIS — M79.7 FIBROMYALGIA: ICD-10-CM

## 2020-12-10 DIAGNOSIS — G89.29 CHRONIC BILATERAL LOW BACK PAIN WITHOUT SCIATICA: Primary | ICD-10-CM

## 2020-12-10 PROCEDURE — 99215 OFFICE O/P EST HI 40 MIN: CPT | Mod: PBBFAC | Performed by: PAIN MEDICINE

## 2020-12-10 PROCEDURE — 99999 PR PBB SHADOW E&M-EST. PATIENT-LVL V: CPT | Mod: PBBFAC,,, | Performed by: PAIN MEDICINE

## 2020-12-10 PROCEDURE — 99214 OFFICE O/P EST MOD 30 MIN: CPT | Mod: S$PBB,,, | Performed by: PAIN MEDICINE

## 2020-12-10 PROCEDURE — 99999 PR PBB SHADOW E&M-EST. PATIENT-LVL V: ICD-10-PCS | Mod: PBBFAC,,, | Performed by: PAIN MEDICINE

## 2020-12-10 PROCEDURE — 99214 PR OFFICE/OUTPT VISIT, EST, LEVL IV, 30-39 MIN: ICD-10-PCS | Mod: S$PBB,,, | Performed by: PAIN MEDICINE

## 2020-12-10 NOTE — PATIENT INSTRUCTIONS
-will start Savella 12.5 mg tablets  -can continue tramadol 50 mg tablets only as needed  -did discuss with patient that she could try stopping Flexeril and tried baclofen those given to her by urgent care  -continue working with physical therapy  -a patient follow up in clinic in 6 weeks

## 2020-12-10 NOTE — TELEPHONE ENCOUNTER
Contact pt regarding elevated BG  Fasting , she at a fruit cup, biscuit, did not take Novolog - bg was < 200  Before lunch , did not take Novolog at this time   Current   Advised her to start Novolog if BG > 150, before a meal (instead of 200)  She agrees with this plan
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
## 2020-12-10 NOTE — TELEPHONE ENCOUNTER
----- Message from Karen Orozco sent at 12/10/2020 11:37 AM CST -----  Pt is calling to report Blood Sugar level which is 431. Please call back at [phone]

## 2020-12-11 ENCOUNTER — OFFICE VISIT (OUTPATIENT)
Dept: ORTHOPEDICS | Facility: CLINIC | Age: 50
End: 2020-12-11
Payer: MEDICAID

## 2020-12-11 VITALS
HEIGHT: 56 IN | HEART RATE: 96 BPM | SYSTOLIC BLOOD PRESSURE: 115 MMHG | DIASTOLIC BLOOD PRESSURE: 77 MMHG | BODY MASS INDEX: 47.69 KG/M2 | WEIGHT: 212 LBS

## 2020-12-11 DIAGNOSIS — G89.29 CHRONIC PAIN OF BOTH SHOULDERS: ICD-10-CM

## 2020-12-11 DIAGNOSIS — M25.512 CHRONIC PAIN OF BOTH SHOULDERS: ICD-10-CM

## 2020-12-11 DIAGNOSIS — M25.511 CHRONIC PAIN OF BOTH SHOULDERS: ICD-10-CM

## 2020-12-11 DIAGNOSIS — R20.2 PARESTHESIA: ICD-10-CM

## 2020-12-11 DIAGNOSIS — G56.03 BILATERAL CARPAL TUNNEL SYNDROME: Primary | ICD-10-CM

## 2020-12-11 PROCEDURE — 95886 MUSC TEST DONE W/N TEST COMP: CPT | Mod: PBBFAC | Performed by: PHYSICAL MEDICINE & REHABILITATION

## 2020-12-11 PROCEDURE — 95911 NRV CNDJ TEST 9-10 STUDIES: CPT | Mod: PBBFAC | Performed by: PHYSICAL MEDICINE & REHABILITATION

## 2020-12-11 PROCEDURE — 99213 OFFICE O/P EST LOW 20 MIN: CPT | Mod: S$PBB,,, | Performed by: PHYSICAL MEDICINE & REHABILITATION

## 2020-12-11 PROCEDURE — 95886 PR EMG COMPLETE, W/ NERVE CONDUCTION STUDIES, 5+ MUSCLES: ICD-10-PCS | Mod: 26,S$PBB,LT, | Performed by: PHYSICAL MEDICINE & REHABILITATION

## 2020-12-11 PROCEDURE — 95911 PR NERVE CONDUCTION STUDY; 9-10 STUDIES: ICD-10-PCS | Mod: 26,S$PBB,, | Performed by: PHYSICAL MEDICINE & REHABILITATION

## 2020-12-11 PROCEDURE — 95911 NRV CNDJ TEST 9-10 STUDIES: CPT | Mod: 26,S$PBB,, | Performed by: PHYSICAL MEDICINE & REHABILITATION

## 2020-12-11 PROCEDURE — 95886 MUSC TEST DONE W/N TEST COMP: CPT | Mod: 26,S$PBB,LT, | Performed by: PHYSICAL MEDICINE & REHABILITATION

## 2020-12-11 PROCEDURE — 99999 PR PBB SHADOW E&M-EST. PATIENT-LVL V: ICD-10-PCS | Mod: PBBFAC,,, | Performed by: PHYSICAL MEDICINE & REHABILITATION

## 2020-12-11 PROCEDURE — 99999 PR PBB SHADOW E&M-EST. PATIENT-LVL V: CPT | Mod: PBBFAC,,, | Performed by: PHYSICAL MEDICINE & REHABILITATION

## 2020-12-11 PROCEDURE — 99215 OFFICE O/P EST HI 40 MIN: CPT | Mod: PBBFAC,25 | Performed by: PHYSICAL MEDICINE & REHABILITATION

## 2020-12-11 PROCEDURE — 99213 PR OFFICE/OUTPT VISIT, EST, LEVL III, 20-29 MIN: ICD-10-PCS | Mod: S$PBB,,, | Performed by: PHYSICAL MEDICINE & REHABILITATION

## 2020-12-11 NOTE — PROGRESS NOTES
SPORTS MEDICINE / PM&R ELECTRODIAGNOSTIC HISTORY & PHYSICAL:    Full Name: Yolanda Betts Gender: Female  Patient ID: [id number] YOB: 1970  Visit Date: 12/11/2020 07:11  Age: 50 Years 6 Months Old  Examining Physician: Suyapa Lyons M.D.  Referring Physician: Dr. Roth  Height: 4 feet 8 inch  Reason for Referral: Eval for CTS    HPI: This is a 50 y.o.  female being seen in clinic today for evaluation of Pain of the Left Hand and Pain of the Right Hand   She is seen as a consult from Dr. Maikel Redmond and they will receive these results electronically.     The problem began several  years ago. She feels sharp, throbbing, burning, stabbing, numbness, tingling, constant, pain at rest and pain with movement pain on her bilateral hands . The symptoms show no change. She has tried heat, NSAIDS, rest and bracing without improvement. She has not tried therapy. She has an appointment scheduled with Dr. Roth next week.    History obtained from patient.    Past family, medical, social, surgical history, and vital signs reviewed in chart.    Review of Systems   Constitutional: Negative for chills, fever and weight loss.   HENT: Negative for hearing loss and sore throat.    Eyes: Negative for blurred vision, photophobia and pain.   Respiratory: Negative for shortness of breath.    Cardiovascular: Negative for chest pain.   Gastrointestinal: Negative for abdominal pain.   Genitourinary: Negative for dysuria.   Musculoskeletal: Positive for joint pain.   Skin: Negative for rash.   Neurological: Negative for tingling and headaches.   Endo/Heme/Allergies: Does not bruise/bleed easily.   Psychiatric/Behavioral: Negative for depression.       General    Nursing note and vitals reviewed.  Constitutional: She is oriented to person, place, and time. She appears well-developed and well-nourished.   HENT:   Head: Normocephalic and atraumatic.   Eyes: Conjunctivae and EOM are normal. Pupils are equal, round, and reactive  to light.   Neck: Neck supple.   Cardiovascular: Intact distal pulses.    Pulmonary/Chest: Effort normal. No respiratory distress.   Abdominal: She exhibits no distension.   Neurological: She is alert and oriented to person, place, and time. She has normal reflexes.   Psychiatric: She has a normal mood and affect.             Right Hand/Wrist Exam     Inspection   Scars: Wrist - absent   Effusion: Wrist - absent   Deformity: Wrist - deformity Hand -  deformity    Range of Motion     Wrist   Extension: normal   Flexion: normal   Pronation: normal   Supination: normal     Tests   Phalens Sign: negative  Tinel's sign (median nerve): positive  Carpal Tunnel Compression Test: positive  Cubital Tunnel Compression Test: negative    Atrophy   Thenar:  negative  Intrinsic:  negative    Other     Neuorologic Exam    Median Distribution: normal  Ulnar Distribution: normal  Radial Distribution: normal    Comments:  Normal OK sign. Negative Froment's. Negative De La Garza's.         Left Hand/Wrist Exam     Inspection   Scars: Wrist - absent   Effusion: Wrist - absent   Deformity: Wrist - absent Hand -  absent    Range of Motion     Wrist   Extension: normal   Flexion: normal   Pronation: normal   Supination: normal     Tests   Phalens Sign: negative  Tinel's sign (median nerve): positive  Carpal Tunnel Compression Test: positive  Cubital Tunnel Compression Test: negative    Atrophy  Thenar:  Negative  Intrinsic: negative    Other     Sensory Exam  Median Distribution: normal  Ulnar Distribution: normal  Radial Distribution: normal    Comments:  Normal OK sign. Negative Froment's. Negative De La Garza's.       Right Elbow Exam     Tests   Tinel's sign (cubital tunnel): negative      Left Elbow Exam     Tests   Tinel's sign (cubital tunnel): negative        Muscle Strength   Right Upper Extremity   Wrist extension: 5/5   Wrist flexion: 4/5   : 4/5   Thumb - APB: 5/5  Left Upper Extremity  Wrist extension: 5/5   Wrist flexion: 4/5    :  4/5   Thumb - APB: 5/5    Vascular Exam       Capillary Refill  Right Hand: normal capillary refill  Left Hand: normal capillary refill        Findings:    CSI      Nerve / Sites Rec. Site Peak Lat NP Amp Segments Peak Diff     ms µV  ms   L Median - CSI      Median palm Wrist 2.2 21.1        Ulnar palm Wrist 1.9 4.6        CSI    CSI 0.3   R Median - CSI      Median palm Wrist 2.2 20.3        Ulnar palm Wrist 1.9 20.8        CSI    CSI 0.3       SNC      Nerve / Sites Rec. Site Onset Lat Peak Lat Amp Segments Distance Velocity     ms ms µV  mm m/s   L Radial - Anatomical snuff box (Forearm)      Forearm Wrist 1.5 2.1 21.1 Forearm - Wrist 100 69   R Radial - Anatomical snuff box (Forearm)      Forearm Wrist 1.4 2.1 27.6 Forearm - Wrist 100 71       MNC      Nerve / Sites Muscle Latency Amplitude Duration Rel Amp Segments Distance Lat Diff Velocity     ms mV ms %  mm ms m/s   L Median - APB      Wrist APB 3.2 8.0 5.1 100 Wrist - APB 80        Elbow APB 6.4 8.4 5.4 105 Elbow - Wrist 190 3.2 60   R Median - APB      Wrist APB 3.3 6.8 5.7 100 Wrist - APB 80        Elbow APB 6.7 7.3 6.0 106 Elbow - Wrist 180 3.4 52   L Ulnar - ADM      Wrist ADM 2.3 5.0 4.9 100 Wrist - ADM 80        B.Elbow ADM 5.4 5.6 5.1 112 B.Elbow - Wrist 165 3.1 53      A.Elbow ADM 7.3 5.5 5.2 98.5 A.Elbow - B.Elbow 100 1.9 53         A.Elbow - Wrist  5.0    R Ulnar - ADM      Wrist ADM 2.3 6.3 4.8 100 Wrist - ADM 80        B.Elbow ADM 5.5 6.5 4.8 103 B.Elbow - Wrist 175 3.2 55      A.Elbow ADM 7.2 6.2 4.7 96.6 A.Elbow - B.Elbow 100 1.8 56         A.Elbow - Wrist  4.9        EMG         EMG Summary Table     Spontaneous MUAP Recruitment   Muscle IA Fib PSW Fasc Other Dur. Dur Amp Dur Polys Pattern Effort   L. First dorsal interosseous N None None None . _NFT_ _NFT_ N N N N .   L. Extensor indicis proprius N None None None . _NFT_ _NFT_ N N N N .   L. Pronator teres N None None None . _NFT_ _NFT_ N N N N .   L. Biceps brachii N None None  None . _NFT_ _NFT_ N N N N .   L. Deltoid N None None None . _NFT_ _NFT_ N N N N .       Results:    - The bilateral median motor responses  were normal.  - The bilateral median sensory responses showed evidence of demyelination across the wrist, but without axonal involvement.   - Bilateral ulnar motor and sensory responses were normal.   - Bilateral superficial radial sensory responses were normal.  - Needle EMG examination of the left upper extremity was normal.    Interpretation:    1. ABNORMAL study.     2. Today's findings are consistent with mild bilateral carpal tunnel syndrome.     3. No evidence of bilateral ulnar or radial neuropathy.    4. No evidence of left cervical radiculopathy or diffuse polyneuropathy.           IMPRESSION/PLAN: Yolanda is a 50 y.o.  female with:    1. Bilateral carpal tunnel syndrome  - EMG W/ ULTRASOUND AND NERVE CONDUCTION TEST 2 Extremities    2. Paresthesia    3. Chronic pain of both shoulders       The findings were discussed with Yolanda in detail. She was given handouts regarding CTS. She will follow-up with Dr. Roth next week. All of her questions were answered. She will follow-up with Dr. Maikel Redmond for further evaluation and management.    Disclaimer: This note was prepared using a voice recognition system and is likely to have sound alike errors within the text.     Suyapa Lyons M.D.  Sports Medicine / PM&R

## 2020-12-11 NOTE — PATIENT INSTRUCTIONS
Carpal Tunnel Syndrome    Carpal tunnel syndrome is a painful condition of the wrist and arm. It is caused by pressure on the median nerve.  The median nerve is one of the nerves that give feeling and movement to the hand. It passes through a tunnel in the wrist called the carpal tunnel. This tunnel is made up of bones and ligaments. Narrowing of this tunnel or swelling of the tissues inside the tunnel puts pressure on the median nerve. This causes numbness, pins and needles, or electric shooting pains in your hand and forearm. Often the pain is worse at night and may wake you when you are asleep.  Carpal tunnel syndrome may occur during pregnancy and with use of birth control pills. It is more common in workers who must often bend their wrists. It is also common in people who work with power tools that cause strong vibrations.  Home care  · Rest the painful wrist. Avoid repeated bending of the wrist back and forth. This puts pressure on the median nerve. Avoid using power tools with strong vibrations.  · If you were given a splint, wear it at night while you sleep. You may also wear it during the day for comfort.  · Move your fingers and wrists often to avoid stiffness.  · Elevate your arms on pillows when you lie down.  · Try using the unaffected hand more.  · Try not to hold your wrists in a bent, downward position.  · Sometimes changes in the work place may ease symptoms. If you type most of the day, it may help to change the position of your keyboard or add a wrist support. Your wrist should be in a neutral position and not bent back when typing.  · You may use over-the-counter pain medicine to treat pain and inflammation, unless another medicine was prescribed. Anti-inflammatory pain medicines, such as ibuprofen or naproxen may be more effective than acetaminophen, which treats pain, but not inflammation. If you have chronic liver or kidney disease or ever had a stomach ulcer or GI bleeding, talk with your  doctor before using these medicines.  · Opioid pain medicine will only give temporary relief and does not treat the problem. If pain continues, you may need a shot of a steroid drug into your wrist.  · If the above methods fail, you may need surgery. This will open the carpal tunnel and release the pressure on the trapped nerve.  Follow-up care  Follow up with your healthcare provider, or as advised, if the pain doesnt begin to improve within the next week.  If X-rays were taken, you will be notified of any new findings that may affect your care.  When to seek medical advice  Call your healthcare provider right away if any of these occur:  · Pain not improving with the above treatment  · Fingers or hand become cold, blue, numb, or tingly  · Your whole arm becomes swollen or weak  Date Last Reviewed: 11/23/2015  © 7400-7340 [employer]. [address] [place] [address]. All rights reserved. This information is not intended as a substitute for professional medical care. Always follow your healthcare professional's instructions.        Carpal Tunnel Syndrome Prevention Tips  Some repetitive hand activities put you at higher risk for carpal tunnel syndrome (CTS). But you can reduce your risk. Learn how to change the way you use your hands. Below are tips for at home and on the job. Be sure to also follow the hand and wrist safety policies at your workplace.      Keep your wrist in a neutral (straight) position when exercising.      Keep your wrist in neutral  Keep a neutral (straight) wrist position as often as you can. Dont use your wrist in a bent (flexed) position for long periods of time. This includes extended or twisted positions.  Watch your   Dont just use your thumb and index finger to grasp or lift. This can put stress on your wrist. When you can, use your whole hand and all its fingers to grasp an object.  Minimize repetition  Dont move your arms or hands or hold an object in  the same way for long periods of time. Even simple, light tasks can cause injury this way. Instead, alternate tasks or switch hands.  Rest your hands  Give your hands a break from time to time with a rest. Even a few minutes once an hour can help.  Reduce speed and force  Slow down the speed in which you do a forceful, repetitive motion. This gives your wrist time to recover from the effort. Use power tools to help reduce the force.  Strengthen the muscles  Weak muscles may lead to a poor wrist or arm position. Exercises will make your hand and arm muscles stronger. This can help you keep a better position.  Date Last Reviewed: 9/11/2015  © 2967-7884 [employer]. [address] [place] [address], [place] [address]. All rights reserved. This information is not intended as a substitute for professional medical care. Always follow your healthcare professional's instructions.

## 2020-12-11 NOTE — LETTER
December 11, 2020      Maikel Redmond MD  [address] [facility] [address]           [facility]  [address] [facility] [address]  Phone: [phone]  Fax: [phone]          Patient: Yolanda Betts   MR Number: [id number]   YOB: 1970   Date of Visit: 12/11/2020       Dear Dr. Maikel Redmond:    Thank you for referring Yolanda Betts to me for evaluation. Attached you will find relevant portions of my assessment and plan of care.    If you have questions, please do not hesitate to call me. I look forward to following Yolanda Betts along with you.    Sincerely,    Suyapa Lyons MD    Enclosure  CC:  No Recipients    If you would like to receive this communication electronically, please contact [email or web address] or [phone] to request more information on [employer] access.    For providers and/or their staff who would like to refer a patient to [facility], please contact us through our one-stop-shop provider referral line, [facility], at [phone].    If you feel you have received this communication in error or would no longer like to receive these types of communications, please e-mail [email or web address]

## 2020-12-14 ENCOUNTER — OFFICE VISIT (OUTPATIENT)
Dept: DIABETES | Facility: CLINIC | Age: 50
End: 2020-12-14
Payer: MEDICAID

## 2020-12-14 ENCOUNTER — OFFICE VISIT (OUTPATIENT)
Dept: INTERNAL MEDICINE | Facility: CLINIC | Age: 50
End: 2020-12-14
Payer: MEDICAID

## 2020-12-14 ENCOUNTER — TELEPHONE (OUTPATIENT)
Dept: DIABETES | Facility: CLINIC | Age: 50
End: 2020-12-14

## 2020-12-14 VITALS
DIASTOLIC BLOOD PRESSURE: 80 MMHG | BODY MASS INDEX: 47.17 KG/M2 | WEIGHT: 209.69 LBS | HEIGHT: 56 IN | OXYGEN SATURATION: 97 % | TEMPERATURE: 98 F | HEART RATE: 84 BPM | SYSTOLIC BLOOD PRESSURE: 114 MMHG

## 2020-12-14 DIAGNOSIS — G43.109 MIGRAINE WITH AURA AND WITHOUT STATUS MIGRAINOSUS, NOT INTRACTABLE: Chronic | ICD-10-CM

## 2020-12-14 DIAGNOSIS — E11.59 HYPERTENSION COMPLICATING DIABETES: Chronic | ICD-10-CM

## 2020-12-14 DIAGNOSIS — G44.209 TENSION HEADACHE: ICD-10-CM
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
## 2020-12-14 DIAGNOSIS — M54.2 CERVICALGIA: ICD-10-CM

## 2020-12-14 DIAGNOSIS — I15.2 HYPERTENSION COMPLICATING DIABETES: Chronic | ICD-10-CM

## 2020-12-14 PROCEDURE — 99211 OFF/OP EST MAY X REQ PHY/QHP: CPT | Mod: 95,,, | Performed by: NURSE PRACTITIONER

## 2020-12-14 PROCEDURE — 99214 PR OFFICE/OUTPT VISIT, EST, LEVL IV, 30-39 MIN: ICD-10-PCS | Mod: S$PBB,,, | Performed by: PHYSICIAN ASSISTANT

## 2020-12-14 PROCEDURE — 99999 PR PBB SHADOW E&M-EST. PATIENT-LVL V: ICD-10-PCS | Mod: PBBFAC,,, | Performed by: PHYSICIAN ASSISTANT

## 2020-12-14 PROCEDURE — 99999 PR PBB SHADOW E&M-EST. PATIENT-LVL V: CPT | Mod: PBBFAC,,, | Performed by: PHYSICIAN ASSISTANT

## 2020-12-14 PROCEDURE — 99214 OFFICE O/P EST MOD 30 MIN: CPT | Mod: S$PBB,,, | Performed by: PHYSICIAN ASSISTANT

## 2020-12-14 PROCEDURE — 99211 PR OFFICE/OUTPT VISIT, EST, LEVL I: ICD-10-PCS | Mod: 95,,, | Performed by: NURSE PRACTITIONER

## 2020-12-14 PROCEDURE — 99215 OFFICE O/P EST HI 40 MIN: CPT | Mod: PBBFAC | Performed by: PHYSICIAN ASSISTANT

## 2020-12-14 RX ORDER — INSULIN GLARGINE 300 U/ML
108 INJECTION, SOLUTION SUBCUTANEOUS NIGHTLY
Qty: 36 ML | Refills: 0 | Status: SHIPPED | OUTPATIENT
Start: 2020-12-14 | End: 2021-02-23 | Stop reason: SDUPTHER

## 2020-12-14 RX ORDER — AMLODIPINE BESYLATE 10 MG/1
10 TABLET ORAL DAILY
Qty: 30 TABLET | Refills: 11
Start: 2020-12-14 | End: 2021-03-30 | Stop reason: SDUPTHER

## 2020-12-14 RX ORDER — INSULIN ASPART 100 [IU]/ML
20 INJECTION, SOLUTION INTRAVENOUS; SUBCUTANEOUS
Qty: 15 ML | Refills: 0
Start: 2020-12-14 | End: 2021-03-11 | Stop reason: SDUPTHER

## 2020-12-14 RX ORDER — AMLODIPINE BESYLATE 5 MG/1
5 TABLET ORAL NIGHTLY
Qty: 30 TABLET | Refills: 11
Start: 2020-12-14 | End: 2021-03-11 | Stop reason: SDUPTHER
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
## 2020-12-14 NOTE — TELEPHONE ENCOUNTER
----- Message from Elizabeth Ceron NP sent at 12/14/2020  2:19 PM CST -----  A1c in 6 weeks - see other appts3 month follow up

## 2020-12-14 NOTE — PROGRESS NOTES
Established Patient - Audio Only Telehealth Visit     The patient location is: home  The chief complaint leading to consultation is: DM follow up/hyoglycemia  Visit type: Virtual visit with audio only (telephone)  Total time spent with patient: 10 mins       The reason for the audio only service rather than synchronous audio and video virtual visit was related to technical difficulties or patient preference/necessity.     Each patient to whom I provide medical services by telemedicine is:  (1) informed of the relationship between the physician and patient and the respective role of any other health care provider with respect to management of the patient; and (2) notified that they may decline to receive medical services by telemedicine and may withdraw from such care at any time. Patient verbally consented to receive this service via voice-only telephone call.       HPI: Yolanda Betts is a 50 y.o. Black or  female presenting for a new consult for diabetes. Patient has been diagnosed with diabetes for > 10 years.     Complications related to diabetes:  HTN, Hyperlipidemia, peripheral neuropathy.   Other pertinent history: denies Pancreatitis; denies Gastroparesis; denies DKA; denies Hx/family Hx of MEN2/MTC; reports Frequent UTIs/yeast infections     Past failed treatment include:  Jardiance- multiple yeast infections; Victoza - GI upset, CARIAS- hypoglycemia      Blood glucose testing is performed regularly, > 130 Fasting, < 180 PP. Denies hypoglycemia last visit- lowest reading 96; CGM - No     Compliance:The patient reports medication compliance all of the time and diet compliance most of the time.     Assessment and plan:    - Type 2 DM, with long term insulin use, with complications      Diabetes Medications             exenatide microspheres (BYDUREON) 2 mg/0.65 mL PnIj Inject 2 mg into the skin every 7 days. CONTINUE    glucagon, human recombinant, (GLUCAGON EMERGENCY KIT, HUMAN,) 1 mg SolR  Inject 1 mg into the muscle as needed. Emergency use         insulin aspart U-100 (NOVOLOG FLEXPEN U-100 INSULIN) 100 unit/mL (3 mL) InPn pen Inject 20 Units into the skin 3 (three) times daily before meals. If BG > 150  before meal CONTINUE    metFORMIN (GLUCOPHAGE) 1000 MG tablet Take 1 tablet (1,000 mg total) by mouth 2 (two) times daily with meals. CONTINUE     TOUJEO MAX U-300 SOLOSTAR 300 unit/mL (3 mL) InPn Inject 108 units once daily at bedtime TITRATE TO GOAL 2 UNITS EVERY 3 DAYS UNTIL FASTING            This service was not originating from a related E/M service provided within the previous 7 days nor will  to an E/M service or procedure within the next 24 hours or my soonest available appointment.  Prevailing standard of care was able to be met in this audio-only visit.

## 2020-12-15 ENCOUNTER — OFFICE VISIT (OUTPATIENT)
Dept: ORTHOPEDICS | Facility: CLINIC | Age: 50
End: 2020-12-15
Payer: MEDICAID

## 2020-12-15 ENCOUNTER — TELEPHONE (OUTPATIENT)
Dept: INTERNAL MEDICINE | Facility: CLINIC | Age: 50
End: 2020-12-15

## 2020-12-15 VITALS
HEART RATE: 96 BPM | HEIGHT: 56 IN | SYSTOLIC BLOOD PRESSURE: 129 MMHG | WEIGHT: 209 LBS | BODY MASS INDEX: 47.01 KG/M2 | DIASTOLIC BLOOD PRESSURE: 74 MMHG

## 2020-12-15 DIAGNOSIS — G56.03 BILATERAL CARPAL TUNNEL SYNDROME: Primary | ICD-10-CM

## 2020-12-15 DIAGNOSIS — Z01.818 PREOP TESTING: Primary | ICD-10-CM

## 2020-12-15 PROCEDURE — 99999 PR PBB SHADOW E&M-EST. PATIENT-LVL V: ICD-10-PCS | Mod: PBBFAC,,, | Performed by: ORTHOPAEDIC SURGERY

## 2020-12-15 PROCEDURE — 99213 OFFICE O/P EST LOW 20 MIN: CPT | Mod: S$PBB,,, | Performed by: ORTHOPAEDIC SURGERY

## 2020-12-15 PROCEDURE — 99213 PR OFFICE/OUTPT VISIT, EST, LEVL III, 20-29 MIN: ICD-10-PCS | Mod: S$PBB,,, | Performed by: ORTHOPAEDIC SURGERY

## 2020-12-15 PROCEDURE — 99215 OFFICE O/P EST HI 40 MIN: CPT | Mod: PBBFAC | Performed by: ORTHOPAEDIC SURGERY

## 2020-12-15 PROCEDURE — 99999 PR PBB SHADOW E&M-EST. PATIENT-LVL V: CPT | Mod: PBBFAC,,, | Performed by: ORTHOPAEDIC SURGERY

## 2020-12-15 NOTE — TELEPHONE ENCOUNTER
----- Message from Kalyani Thompson sent at 12/15/2020  1:50 PM CST -----  Contact: Yolanda  Patient called regarding being nauseated and would like some medication called in at   [facility]  [address] [place] [address]  Phone: [phone] Fax: [phone]    Please give her a call back at [phone] (home)     Thanks,  Kalyani Thompson

## 2020-12-15 NOTE — PROGRESS NOTES
Subjective:     Patient ID: Yolanda Betts is a 50 y.o. female.    Chief Complaint: Pain and Numbness of the Left Wrist and Pain and Numbness of the Right Wrist    The patient is a 50-year-old female with bilateral carpal tunnel syndrome documented by nerve conduction studies.  She has tried splinting without improvement.  The numbness is constant rather than intermittent.  She wishes to have a left carpal tunnel release.      Past Medical History:   Diagnosis Date    Abnormal Pap smear of cervix     HPV genital warts    Anemia     Anxiety     Arthritis     Asthma 10/11/2016    Bipolar 1 disorder     Diabetes mellitus, type 2     Dyslipidemia associated with type 2 diabetes mellitus 2019    Genital warts     GERD (gastroesophageal reflux disease)     Herpes simplex virus (HSV) infection     Hyperlipidemia     Hypertension     Hypertension complicating diabetes 2019    Migraine with aura and without status migrainosus, not intractable 3/21/2016    Mild persistent asthma without complication 10/11/2016    Morbid obesity with body mass index (BMI) of 45.0 to 49.9 in adult 8/3/2017    Obstructive sleep apnea     ALEN (obstructive sleep apnea)     2L per N/C q HS    Schizoaffective disorder, bipolar type 2019    Seasonal allergic rhinitis due to pollen 2019    Type 2 diabetes mellitus with hyperglycemia, with long-term current use of insulin 2017    Type 2 diabetes mellitus with hyperglycemia, without long-term current use of insulin 2017     Past Surgical History:   Procedure Laterality Date    abcess removed right back      BELT ABDOMINOPLASTY      BREAST SURGERY Bilateral 1996    Reduction     SECTION      X 2    COLONOSCOPY      COLONOSCOPY N/A 2018    Procedure: colonoscopy for iron deficiency anemia;  Surgeon: Frankie Sanchez MD;  Location[facility];  Service: Endoscopy;  Laterality: N/A;    COLONOSCOPY N/A 2018     "Procedure: COLONOSCOPY;  Surgeon: Frankie Sanchez MD;  Location: [facility] ENDO;  Service: Endoscopy;  Laterality: N/A;    HYSTERECTOMY      w/ BSO; hypermenorrhea    MOUTH SURGERY  1996    OOPHORECTOMY      hyst/bso, hypermenorrhea    ROBOT-ASSISTED CHOLECYSTECTOMY USING DA DARI XI N/A 1/3/2020    Procedure: XI ROBOTIC CHOLECYSTECTOMY;  Surgeon: Damir Driscoll MD;  Location: [facility] OR;  Service: General;  Laterality: N/A;    TOTAL REDUCTION MAMMOPLASTY      TUBAL LIGATION       Family History   Problem Relation Age of Onset    Diabetes Mother     Hyperlipidemia Mother     Hypertension Mother     Asthma Mother     COPD Mother     Glaucoma Mother     Thyroid disease Mother     Anesthesia problems Mother         "almost had a cardiac arrest" , blood clots    Hypertension Father     Hyperlipidemia Father     Cancer Father         Brain, lung, liver, kidney    Heart disease Maternal Grandmother     Hyperlipidemia Maternal Grandmother     Hypertension Maternal Grandmother     Cataracts Maternal Grandmother     Diabetes Maternal Grandmother     Heart disease Maternal Grandfather     Hyperlipidemia Maternal Grandfather     Hypertension Maternal Grandfather     Glaucoma Maternal Grandfather     Cancer Maternal Grandfather     Cataracts Maternal Grandfather     Macular degeneration Maternal Grandfather     Diabetes Maternal Grandfather     Heart disease Paternal Grandmother     Hyperlipidemia Paternal Grandmother     Hypertension Paternal Grandmother     Cataracts Paternal Grandmother     Heart disease Paternal Grandfather     Hyperlipidemia Paternal Grandfather     Hypertension Paternal Grandfather     Cataracts Paternal Grandfather     Breast cancer Maternal Cousin     Breast cancer Maternal Cousin     Breast cancer Maternal Cousin     Breast cancer Maternal Cousin      Social History     Socioeconomic History    Marital status: Single     Spouse name: Not on file    Number of " "children: Not on file    Years of education: Not on file    Highest education level: Not on file   Occupational History    Not on file   Social Needs    Financial resource strain: Not on file    Food insecurity     Worry: Not on file     Inability: Not on file    Transportation needs     Medical: Not on file     Non-medical: Not on file   Tobacco Use    Smoking status: Never Smoker    Smokeless tobacco: Never Used   Substance and Sexual Activity    Alcohol use: Yes     Alcohol/week: 0.0 standard drinks     Comment: socially  No alcohol 72h prior to sx    Drug use: No    Sexual activity: Yes     Partners: Male   Lifestyle    Physical activity     Days per week: Not on file     Minutes per session: Not on file    Stress: Not on file   Relationships    Social connections     Talks on phone: Not on file     Gets together: Not on file     Attends Church service: Not on file     Active member of club or organization: Not on file     Attends meetings of clubs or organizations: Not on file     Relationship status: Not on file   Other Topics Concern    Not on file   Social History Narrative    Long-term care nurse     Medication List with Changes/Refills   Current Medications    ALBUTEROL (PROVENTIL) 2.5 MG /3 ML (0.083 %) NEBULIZER SOLUTION    Take 3 mLs (2.5 mg total) by nebulization every 4 (four) hours.    AMLODIPINE (NORVASC) 10 MG TABLET    Take 1 tablet (10 mg total) by mouth once daily.    AMLODIPINE (NORVASC) 5 MG TABLET    Take 1 tablet (5 mg total) by mouth every evening.    AMMONIUM LACTATE 12 % CREA    Apply 1 Application topically 2 (two) times daily.    ARIPIPRAZOLE (ABILIFY) 10 MG TAB    Take 1 tablet (10 mg total) by mouth once daily.    ASPIRIN (ECOTRIN) 81 MG EC TABLET    Take 81 mg by mouth once daily.    ATORVASTATIN (LIPITOR) 20 MG TABLET    Take 1 tablet (20 mg total) by mouth every evening.    BD INSULIN SYRINGE ULTRA-FINE 1/2 ML 30 GAUGE X 1/2" SYRG        BENZTROPINE (COGENTIN) 1 " MG TABLET    Take 1 tablet (1 mg total) by mouth every evening.    BLOOD SUGAR DIAGNOSTIC STRP    1 each by Misc.(Non-Drug; Combo Route) route 4 (four) times daily. [facility] Glucose Test Strips    BLOOD-GLUCOSE METER MISC    Use as directed    BUDESONIDE-FORMOTEROL 160-4.5 MCG (SYMBICORT) 160-4.5 MCG/ACTUATION HFAA    Inhale 2 puffs into the lungs every 12 (twelve) hours. Controller : Use spacer    CLONAZEPAM (KLONOPIN) 1 MG TABLET    Take 1 tablet by mouth three times a day    CLOTRIMAZOLE-BETAMETHASONE 1-0.05% (LOTRISONE) CREAM    Apply to affected area 2 times daily    CYCLOBENZAPRINE (FLEXERIL) 10 MG TABLET    Take 1 tablet (10 mg total) by mouth daily as needed (for back muscle spasms).    CYCLOSPORINE 0.05 % DROP    Place 1 drop into both eyes 2 (two) times daily.    DULOXETINE (CYMBALTA) 60 MG CAPSULE    Take 1 capsule by mouth twice daily    ERGOCALCIFEROL (ERGOCALCIFEROL) 50,000 UNIT CAP    Take 1 capsule (50,000 Units total) by mouth every 14 (fourteen) days.    EXENATIDE MICROSPHERES (BYDUREON) 2 MG/0.65 ML PNIJ    Inject 2 mg into the skin every 7 days.    FISH OIL-OMEGA-3 FATTY ACIDS 300-1,000 MG CAPSULE    Take 1 capsule by mouth once daily.    FROVATRIPTAN (FROVA) 2.5 MG TABLET    Take 1 tablet at onset of acute migraine. May take 1 more tablet 2 hours later if needed. (MAX 2 TABLET PER DAY. MAX 4 TABLETS PER WEEK.)    FUROSEMIDE (LASIX) 20 MG TABLET    Take 1 tablet (20 mg total) by mouth once daily.    GLUCAGON, HUMAN RECOMBINANT, (GLUCAGON EMERGENCY KIT, HUMAN,) 1 MG SOLR    Inject 1 mg into the muscle as needed. Emergency use    HYDRALAZINE (APRESOLINE) 10 MG TABLET    Take 1 tablet (10 mg total) by mouth every 12 (twelve) hours.    INSULIN ASPART U-100 (NOVOLOG FLEXPEN U-100 INSULIN) 100 UNIT/ML (3 ML) INPN PEN    Inject 20 Units into the skin 3 (three) times daily before meals. If BG > 150  before meal    LANCETS 30 GAUGE MISC    Use as directed 3 times daily    LUBIPROSTONE (AMITIZA) 24 MCG CAP   "  Take 1 capsule (24 mcg total) by mouth 2 (two) times daily.    LURASIDONE (LATUDA) 120 MG TAB    Take 1 tablet by mouth at bedtime    MAGNESIUM OXIDE (MAG-OX) 400 MG (241.3 MG MAGNESIUM) TABLET    Take 1 tablet (400 mg total) by mouth once daily.    METFORMIN (GLUCOPHAGE) 1000 MG TABLET    Take 1 tablet (1,000 mg total) by mouth 2 (two) times daily with meals.    METOPROLOL SUCCINATE (TOPROL-XL) 200 MG 24 HR TABLET    Take 1 tablet by mouth once daily in the evening.    MILNACIPRAN (SAVELLA) 12.5 MG TAB TABLET    Take 1 tablet (12.5 mg total) by mouth 2 (two) times daily.    MIRTAZAPINE (REMERON) 45 MG TABLET    Take 1 tablet by mouth at bedtime    MONTELUKAST (SINGULAIR) 10 MG TABLET    Take 1 tablet (10 mg total) by mouth every evening.    MULTIVIT,CALC,MINS/IRON/FOLIC (DAILY MULTIPLE FOR WOMEN ORAL)    Take 1 tablet by mouth once daily.    PEN NEEDLE, DIABETIC (BD ULTRA-FINE MINI PEN NEEDLE) 31 GAUGE X 3/16" NDLE    Use 5 a day    PEN NEEDLE, DIABETIC (BD ULTRA-FINE MINI PEN NEEDLE) 31 GAUGE X 3/16" NDLE    Use 5 a day    PROMETHAZINE (PHENERGAN) 25 MG TABLET        SPIRONOLACTONE (ALDACTONE) 25 MG TABLET    Take 1 tablet (25 mg total) by mouth once daily.    TOUJEO MAX U-300 SOLOSTAR 300 UNIT/ML (3 ML) INPN    Inject 108 Units into the skin every evening.    TRAMADOL (ULTRAM) 50 MG TABLET    Take 1 tablet (50 mg total) by mouth every 8 (eight) hours as needed. Greater than 7 day supply medically necessary.    VALACYCLOVIR (VALTREX) 1000 MG TABLET    Take 1 tablet (1,000 mg total) by mouth once daily.    VALSARTAN (DIOVAN) 320 MG TABLET    Take 1 tablet (320 mg total) by mouth once daily.    ZOLPIDEM (AMBIEN) 10 MG TAB    Take 1 tablet by mouth at bedtime     Review of patient's allergies indicates:   Allergen Reactions    Codeine Itching    Neuromuscular blockers, steroidal Hives     some    Latex, natural rubber Rash    Morphine Rash     itching    Norco [hydrocodone-acetaminophen] Itching, Rash and " Hallucinations    Seconal [secobarbital sodium] Rash     itching    Tylox [oxycodone-acetaminophen] Rash     Review of Systems   Constitution: Negative for malaise/fatigue.   HENT: Negative for hearing loss.    Eyes: Negative for double vision and visual disturbance.   Cardiovascular: Negative for chest pain.   Respiratory: Positive for shortness of breath and wheezing.    Endocrine: Negative for cold intolerance.   Hematologic/Lymphatic: Does not bruise/bleed easily.   Skin: Negative for poor wound healing and suspicious lesions.   Musculoskeletal: Positive for back pain, myalgias and neck pain. Negative for gout, joint pain and joint swelling.   Gastrointestinal: Positive for constipation, diarrhea and heartburn. Negative for nausea and vomiting.   Genitourinary: Negative for dysuria.   Neurological: Positive for focal weakness, headaches, numbness, paresthesias and sensory change.   Psychiatric/Behavioral: Positive for altered mental status. Negative for depression, memory loss and substance abuse. The patient is nervous/anxious.    Allergic/Immunologic: Negative for persistent infections.       Objective:   Body mass index is 46.86 kg/m².  Vitals:    12/15/20 0704   BP: 129/74   Pulse: 96                General    Constitutional: She is oriented to person, place, and time. She appears well-developed and well-nourished. No distress.   HENT:   Head: Normocephalic.   Mouth/Throat: Oropharynx is clear and moist.   Eyes: EOM are normal.   Neck: Normal range of motion.   Cardiovascular: Normal rate.    Pulmonary/Chest: Effort normal.   Abdominal: Soft.   Neurological: She is alert and oriented to person, place, and time. No cranial nerve deficit.   Psychiatric: She has a normal mood and affect.             Right Hand/Wrist Exam     Inspection   Scars: Wrist - absent Hand -  absent  Effusion: Wrist - absent Hand -  absent    Pain   Wrist - The patient exhibits pain of the flexor/pronator group.    Tests   Phalens  sign: positive  Tinel's sign (median nerve): positive  Carpal Tunnel Compression Test: positive    Atrophy   Thenar:  negative  Intrinsic:  negative    Other     Neuorologic Exam    Median Distribution: abnormal  Ulnar Distribution: normal  Radial Distribution: normal    Comments:    The patient had a positive Tinel and positive Phalen sign.  There is no thenar atrophy noted.      Left Hand/Wrist Exam     Inspection   Scars: Wrist - absent Hand -  absent  Effusion: Wrist - absent Hand -  absent    Pain   Wrist - The patient exhibits pain of the flexor/pronator group.    Tests   Phalens sign: positive  Tinel's sign (median nerve): positive  Carpal Tunnel Compression Test: positive    Atrophy  Thenar:  Negative  Intrinsic: negative    Other     Sensory Exam  Median Distribution: abnormal  Ulnar Distribution: normal  Radial Distribution: normal    Comments:    The patient had a positive Tinel and positive Phalen sign.  There is no thenar atrophy noted.          Vascular Exam       Capillary Refill  Right Hand: normal capillary refill  Left Hand: normal capillary refill      Relevant imaging results reviewed and interpreted by me, discussed with the patient and / or family today   Radiographs of both hands showed osteoarthritic change in the DIP joints and is otherwise unrevealing  Assessment:     Encounter Diagnosis   Name Primary?    Bilateral carpal tunnel syndrome Yes        Plan:      the patient wishes to have left carpal tunnel release.  Risk complications and alternatives were discussed including the risk of infection, anesthetic risk, injury to nerves and vessels, loss of motion, and possible need for additional surgeries were discussed.  She seems to understand and agree to that surgery.  All questions were answered.                Disclaimer: This note was prepared using a voice recognition system and is likely to have sound alike errors within the text.

## 2020-12-18 ENCOUNTER — TELEPHONE (OUTPATIENT)
Dept: PHARMACY | Facility: CLINIC | Age: 50
End: 2020-12-18

## 2020-12-18 ENCOUNTER — TELEPHONE (OUTPATIENT)
Dept: INTERNAL MEDICINE | Facility: CLINIC | Age: 50
End: 2020-12-18

## 2020-12-18 NOTE — TELEPHONE ENCOUNTER
Good Afternoon,     The prior authorization for Yolanda Betts's Savella 12.5mg prescription has been APPROVED FROM 12/18/2020 TO 12/18/2021 with copayment of $0.00.       Patient has been notified of the decision on 12/18/2020 and patient states she will  medication when ready on Monday.    If there are any additional questions or concerns, please contact me.    Thank You!   Consuelo Wolfe CPhT, B.A  Patient Care Advocate   [facility]  Phone: [phone] Ext [phone]  Fax: [phone]

## 2020-12-30 ENCOUNTER — CLINICAL SUPPORT (OUTPATIENT)
Dept: REHABILITATION | Facility: HOSPITAL | Age: 50
End: 2020-12-30
Payer: MEDICAID

## 2020-12-30 ENCOUNTER — TELEPHONE (OUTPATIENT)
Dept: OBSTETRICS AND GYNECOLOGY | Facility: CLINIC | Age: 50
End: 2020-12-30

## 2020-12-30 DIAGNOSIS — G44.209 TENSION HEADACHE: ICD-10-CM

## 2020-12-30 DIAGNOSIS — M25.512 ACUTE PAIN OF BOTH SHOULDERS: ICD-10-CM

## 2020-12-30 DIAGNOSIS — M54.2 CERVICALGIA: ICD-10-CM

## 2020-12-30 DIAGNOSIS — M25.511 ACUTE PAIN OF BOTH SHOULDERS: ICD-10-CM

## 2020-12-30 DIAGNOSIS — M54.42 ACUTE BILATERAL LOW BACK PAIN WITH LEFT-SIDED SCIATICA: ICD-10-CM
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
## 2020-12-30 DIAGNOSIS — M54.2 NECK PAIN: ICD-10-CM

## 2020-12-30 PROCEDURE — 97163 PT EVAL HIGH COMPLEX 45 MIN: CPT | Performed by: PHYSICAL THERAPIST

## 2020-12-30 NOTE — TELEPHONE ENCOUNTER
----- Message from Emmy Borges sent at 12/30/2020 11:27 AM CST -----  Contact: Pt  .Type:  Needs Medical Advice    Who Called:  Yolanda    Pharmacy name and phone #:   .  [facility]  [address]  [place] [address]  Phone: [phone] Fax: [phone]          Would the patient rather a call back or a response via [facility]?  Call back  Best Call Back Number:  [phone] (home)     Additional Information:  pt would like to speak to a nurse//    .Type:  Sooner Appointment Request    Caller is requesting a sooner appointment.  Caller declined first available appointment listed below.  Caller will not accept being placed on the waitlist and is requesting a message be sent to doctor.  Name of Caller: Yolanda  When is the first available appointment?  Symptoms  Would the patient rather a call back or a response via [facility]? callback  Best Call Back Number  Additional Information:
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
## 2020-12-31 ENCOUNTER — TELEPHONE (OUTPATIENT)
Dept: CARDIOLOGY | Facility: CLINIC | Age: 50
End: 2020-12-31

## 2020-12-31 NOTE — TELEPHONE ENCOUNTER
appt made      ----- Message from Beth Mauro sent at 12/31/2020  9:12 AM CST -----  Contact: Yolanda  Type:  Sooner Apoointment Request    Caller is requesting a sooner appointment.  Caller declined first available appointment listed below.  Caller will not accept being placed on the waitlist and is requesting a message be sent to doctor.  Name of Caller:Yolanda  When is the first available appointment?02/15/2021  Symptoms:needs a clearance before her surgery on 02/01/2021  Would the patient rather a call back or a response via [facility]? call  Best Call Back Number:[phone]  Additional Information: n/a    Thanks  NEFTALI

## 2021-01-04 ENCOUNTER — TELEPHONE (OUTPATIENT)
Dept: INTERNAL MEDICINE | Facility: CLINIC | Age: 51
End: 2021-01-04

## 2021-01-04 DIAGNOSIS — I10 ESSENTIAL HYPERTENSION: Chronic | ICD-10-CM

## 2021-01-04 DIAGNOSIS — Z79.4 TYPE 2 DIABETES MELLITUS WITH OTHER SPECIFIED COMPLICATION, WITH LONG-TERM CURRENT USE OF INSULIN: ICD-10-CM

## 2021-01-04 DIAGNOSIS — E11.69 DYSLIPIDEMIA ASSOCIATED WITH TYPE 2 DIABETES MELLITUS: ICD-10-CM

## 2021-01-04 DIAGNOSIS — B00.9 HSV INFECTION: ICD-10-CM

## 2021-01-04 DIAGNOSIS — E11.69 TYPE 2 DIABETES MELLITUS WITH OTHER SPECIFIED COMPLICATION, WITH LONG-TERM CURRENT USE OF INSULIN: ICD-10-CM

## 2021-01-04 DIAGNOSIS — E78.5 DYSLIPIDEMIA ASSOCIATED WITH TYPE 2 DIABETES MELLITUS: ICD-10-CM

## 2021-01-04 RX ORDER — ATORVASTATIN CALCIUM 20 MG/1
20 TABLET, FILM COATED ORAL NIGHTLY
Qty: 30 TABLET | Refills: 0 | Status: SHIPPED | OUTPATIENT
Start: 2021-01-04 | End: 2021-02-04 | Stop reason: SDUPTHER

## 2021-01-04 RX ORDER — HYDRALAZINE HYDROCHLORIDE 10 MG/1
10 TABLET, FILM COATED ORAL EVERY 12 HOURS
Qty: 60 TABLET | Refills: 11 | Status: SHIPPED | OUTPATIENT
Start: 2021-01-04 | End: 2021-06-09

## 2021-01-05 ENCOUNTER — CLINICAL SUPPORT (OUTPATIENT)
Dept: REHABILITATION | Facility: HOSPITAL | Age: 51
End: 2021-01-05
Payer: MEDICAID

## 2021-01-05 ENCOUNTER — DOCUMENTATION ONLY (OUTPATIENT)
Dept: REHABILITATION | Facility: HOSPITAL | Age: 51
End: 2021-01-05

## 2021-01-05 DIAGNOSIS — M54.42 ACUTE BILATERAL LOW BACK PAIN WITH LEFT-SIDED SCIATICA: ICD-10-CM

## 2021-01-05 DIAGNOSIS — M25.512 ACUTE PAIN OF BOTH SHOULDERS: ICD-10-CM

## 2021-01-05 DIAGNOSIS — M25.511 ACUTE PAIN OF BOTH SHOULDERS: ICD-10-CM

## 2021-01-05 DIAGNOSIS — M54.2 NECK PAIN: ICD-10-CM

## 2021-01-05 PROCEDURE — 97110 THERAPEUTIC EXERCISES: CPT | Mod: CQ

## 2021-01-06 ENCOUNTER — HOSPITAL ENCOUNTER (OUTPATIENT)
Dept: CARDIOLOGY | Facility: HOSPITAL | Age: 51
Discharge: HOME | End: 2021-01-06
Attending: INTERNAL MEDICINE
Payer: MEDICAID

## 2021-01-06 ENCOUNTER — OFFICE VISIT (OUTPATIENT)
Dept: CARDIOLOGY | Facility: CLINIC | Age: 51
End: 2021-01-06
Payer: MEDICAID

## 2021-01-06 VITALS
WEIGHT: 209.69 LBS | DIASTOLIC BLOOD PRESSURE: 60 MMHG | HEIGHT: 56 IN | SYSTOLIC BLOOD PRESSURE: 102 MMHG | HEART RATE: 102 BPM | BODY MASS INDEX: 47.17 KG/M2 | OXYGEN SATURATION: 93 %

## 2021-01-06 DIAGNOSIS — G43.109 MIGRAINE WITH AURA AND WITHOUT STATUS MIGRAINOSUS, NOT INTRACTABLE: Chronic | ICD-10-CM

## 2021-01-06 DIAGNOSIS — Z01.810 PREOP CARDIOVASCULAR EXAM: Primary | ICD-10-CM

## 2021-01-06 DIAGNOSIS — E66.2 OBESITY HYPOVENTILATION SYNDROME: ICD-10-CM

## 2021-01-06 DIAGNOSIS — K76.0 NAFLD (NONALCOHOLIC FATTY LIVER DISEASE): Chronic | ICD-10-CM

## 2021-01-06 DIAGNOSIS — Z01.818 PRE-OP EXAM: ICD-10-CM

## 2021-01-06 DIAGNOSIS — Z01.818 PRE-OP EXAM: Primary | ICD-10-CM

## 2021-01-06 DIAGNOSIS — E66.01 MORBID OBESITY WITH BMI OF 45.0-49.9, ADULT: Chronic | ICD-10-CM

## 2021-01-06 DIAGNOSIS — E78.5 DYSLIPIDEMIA ASSOCIATED WITH TYPE 2 DIABETES MELLITUS: Chronic | ICD-10-CM

## 2021-01-06 DIAGNOSIS — R94.2 DIFFUSION CAPACITY OF LUNG (DL), DECREASED: Chronic | ICD-10-CM

## 2021-01-06 DIAGNOSIS — Z79.4 TYPE 2 DIABETES MELLITUS WITH HYPERGLYCEMIA, WITH LONG-TERM CURRENT USE OF INSULIN: Chronic | ICD-10-CM

## 2021-01-06 DIAGNOSIS — E11.65 TYPE 2 DIABETES MELLITUS WITH HYPERGLYCEMIA, WITH LONG-TERM CURRENT USE OF INSULIN: Chronic | ICD-10-CM

## 2021-01-06 DIAGNOSIS — E11.69 DYSLIPIDEMIA ASSOCIATED WITH TYPE 2 DIABETES MELLITUS: Chronic | ICD-10-CM

## 2021-01-06 DIAGNOSIS — E11.42 DIABETIC POLYNEUROPATHY ASSOCIATED WITH TYPE 2 DIABETES MELLITUS: Chronic | ICD-10-CM

## 2021-01-06 PROCEDURE — 93010 ELECTROCARDIOGRAM REPORT: CPT | Mod: ,,, | Performed by: INTERNAL MEDICINE

## 2021-01-06 PROCEDURE — 99215 OFFICE O/P EST HI 40 MIN: CPT | Mod: PBBFAC,25 | Performed by: INTERNAL MEDICINE

## 2021-01-06 PROCEDURE — 99214 OFFICE O/P EST MOD 30 MIN: CPT | Mod: S$PBB,25,, | Performed by: INTERNAL MEDICINE

## 2021-01-06 PROCEDURE — 93005 ELECTROCARDIOGRAM TRACING: CPT

## 2021-01-06 PROCEDURE — 99999 PR PBB SHADOW E&M-EST. PATIENT-LVL V: CPT | Mod: PBBFAC,,, | Performed by: INTERNAL MEDICINE

## 2021-01-06 PROCEDURE — 93010 EKG 12-LEAD: ICD-10-PCS | Mod: ,,, | Performed by: INTERNAL MEDICINE

## 2021-01-06 PROCEDURE — 99214 PR OFFICE/OUTPT VISIT, EST, LEVL IV, 30-39 MIN: ICD-10-PCS | Mod: S$PBB,25,, | Performed by: INTERNAL MEDICINE

## 2021-01-06 PROCEDURE — 99999 PR PBB SHADOW E&M-EST. PATIENT-LVL V: ICD-10-PCS | Mod: PBBFAC,,, | Performed by: INTERNAL MEDICINE

## 2021-01-06 RX ORDER — VALACYCLOVIR HYDROCHLORIDE 1 G/1
1000 TABLET, FILM COATED ORAL DAILY
Qty: 30 TABLET | Refills: 11 | Status: SHIPPED | OUTPATIENT
Start: 2021-01-06 | End: 2021-01-14

## 2021-01-07 ENCOUNTER — TELEPHONE (OUTPATIENT)
Dept: PREADMISSION TESTING | Facility: HOSPITAL | Age: 51
End: 2021-01-07

## 2021-01-07 DIAGNOSIS — Z01.818 PRE-OP TESTING: Primary | ICD-10-CM

## 2021-01-08 ENCOUNTER — CLINICAL SUPPORT (OUTPATIENT)
Dept: REHABILITATION | Facility: HOSPITAL | Age: 51
End: 2021-01-08
Payer: MEDICAID

## 2021-01-08 DIAGNOSIS — M54.42 ACUTE BILATERAL LOW BACK PAIN WITH LEFT-SIDED SCIATICA: ICD-10-CM

## 2021-01-08 DIAGNOSIS — M25.511 ACUTE PAIN OF BOTH SHOULDERS: ICD-10-CM

## 2021-01-08 DIAGNOSIS — M54.2 NECK PAIN: ICD-10-CM

## 2021-01-08 DIAGNOSIS — M25.512 ACUTE PAIN OF BOTH SHOULDERS: ICD-10-CM

## 2021-01-08 PROCEDURE — 97140 MANUAL THERAPY 1/> REGIONS: CPT | Mod: CQ

## 2021-01-08 PROCEDURE — 97110 THERAPEUTIC EXERCISES: CPT | Mod: CQ

## 2021-01-12 ENCOUNTER — CLINICAL SUPPORT (OUTPATIENT)
Dept: REHABILITATION | Facility: HOSPITAL | Age: 51
End: 2021-01-12
Payer: MEDICAID

## 2021-01-12 ENCOUNTER — TELEPHONE (OUTPATIENT)
Dept: PULMONOLOGY | Facility: CLINIC | Age: 51
End: 2021-01-12

## 2021-01-12 DIAGNOSIS — M25.511 ACUTE PAIN OF BOTH SHOULDERS: ICD-10-CM

## 2021-01-12 DIAGNOSIS — M54.2 NECK PAIN: ICD-10-CM

## 2021-01-12 DIAGNOSIS — M25.512 ACUTE PAIN OF BOTH SHOULDERS: ICD-10-CM

## 2021-01-12 DIAGNOSIS — M54.42 ACUTE BILATERAL LOW BACK PAIN WITH LEFT-SIDED SCIATICA: ICD-10-CM

## 2021-01-12 PROCEDURE — 97110 THERAPEUTIC EXERCISES: CPT | Performed by: PHYSICAL THERAPIST

## 2021-01-13 ENCOUNTER — TELEPHONE (OUTPATIENT)
Dept: OBSTETRICS AND GYNECOLOGY | Facility: CLINIC | Age: 51
End: 2021-01-13

## 2021-01-14 ENCOUNTER — TELEPHONE (OUTPATIENT)
Dept: INTERNAL MEDICINE | Facility: CLINIC | Age: 51
End: 2021-01-14

## 2021-01-14 ENCOUNTER — CLINICAL SUPPORT (OUTPATIENT)
Dept: DIABETES | Facility: CLINIC | Age: 51
End: 2021-01-14
Payer: MEDICAID

## 2021-01-14 ENCOUNTER — OFFICE VISIT (OUTPATIENT)
Dept: OBSTETRICS AND GYNECOLOGY | Facility: CLINIC | Age: 51
End: 2021-01-14
Payer: MEDICAID

## 2021-01-14 ENCOUNTER — OFFICE VISIT (OUTPATIENT)
Dept: INTERNAL MEDICINE | Facility: CLINIC | Age: 51
End: 2021-01-14
Payer: MEDICAID

## 2021-01-14 ENCOUNTER — CLINICAL SUPPORT (OUTPATIENT)
Dept: REHABILITATION | Facility: HOSPITAL | Age: 51
End: 2021-01-14
Payer: MEDICAID

## 2021-01-14 ENCOUNTER — HOSPITAL ENCOUNTER (OUTPATIENT)
Dept: RADIOLOGY | Facility: HOSPITAL | Age: 51
Discharge: HOME | End: 2021-01-14
Attending: PHYSICIAN ASSISTANT
Payer: MEDICAID

## 2021-01-14 VITALS
BODY MASS INDEX: 47.27 KG/M2 | TEMPERATURE: 99 F | SYSTOLIC BLOOD PRESSURE: 130 MMHG | DIASTOLIC BLOOD PRESSURE: 82 MMHG | HEART RATE: 96 BPM | WEIGHT: 210.13 LBS | HEIGHT: 56 IN | OXYGEN SATURATION: 96 %

## 2021-01-14 VITALS — BODY MASS INDEX: 47.27 KG/M2 | HEIGHT: 56 IN | WEIGHT: 210.13 LBS

## 2021-01-14 VITALS — SYSTOLIC BLOOD PRESSURE: 142 MMHG | BODY MASS INDEX: 47.65 KG/M2 | DIASTOLIC BLOOD PRESSURE: 86 MMHG | WEIGHT: 212.5 LBS

## 2021-01-14 DIAGNOSIS — M25.512 ACUTE PAIN OF BOTH SHOULDERS: ICD-10-CM

## 2021-01-14 DIAGNOSIS — E11.42 DIABETIC POLYNEUROPATHY ASSOCIATED WITH TYPE 2 DIABETES MELLITUS: Chronic | ICD-10-CM

## 2021-01-14 DIAGNOSIS — F25.0 SCHIZOAFFECTIVE DISORDER, BIPOLAR TYPE: Chronic | ICD-10-CM

## 2021-01-14 DIAGNOSIS — E11.69 DYSLIPIDEMIA ASSOCIATED WITH TYPE 2 DIABETES MELLITUS: Chronic | ICD-10-CM

## 2021-01-14 DIAGNOSIS — G43.109 MIGRAINE WITH AURA AND WITHOUT STATUS MIGRAINOSUS, NOT INTRACTABLE: Chronic | ICD-10-CM

## 2021-01-14 DIAGNOSIS — R51.9 CHRONIC DAILY HEADACHE: Primary | ICD-10-CM

## 2021-01-14 DIAGNOSIS — F31.9 BIPOLAR 1 DISORDER: Chronic | ICD-10-CM

## 2021-01-14 DIAGNOSIS — M79.7 FIBROMYALGIA: Chronic | ICD-10-CM

## 2021-01-14 DIAGNOSIS — E78.1 HYPERTRIGLYCERIDEMIA: Chronic | ICD-10-CM

## 2021-01-14 DIAGNOSIS — E11.59 HYPERTENSION COMPLICATING DIABETES: Chronic | ICD-10-CM

## 2021-01-14 DIAGNOSIS — M25.511 ACUTE PAIN OF BOTH SHOULDERS: ICD-10-CM

## 2021-01-14 DIAGNOSIS — G47.36 NOCTURNAL HYPOXEMIA DUE TO OBESITY: Chronic | ICD-10-CM

## 2021-01-14 DIAGNOSIS — N75.0 BARTHOLIN CYST: Primary | ICD-10-CM

## 2021-01-14 DIAGNOSIS — I15.2 HYPERTENSION COMPLICATING DIABETES: Chronic | ICD-10-CM

## 2021-01-14 DIAGNOSIS — B00.9 HSV INFECTION: ICD-10-CM

## 2021-01-14 DIAGNOSIS — E78.5 DYSLIPIDEMIA ASSOCIATED WITH TYPE 2 DIABETES MELLITUS: Chronic | ICD-10-CM

## 2021-01-14 DIAGNOSIS — E11.65 TYPE 2 DIABETES MELLITUS WITH HYPERGLYCEMIA, WITH LONG-TERM CURRENT USE OF INSULIN: Chronic | ICD-10-CM

## 2021-01-14 DIAGNOSIS — E55.9 VITAMIN D DEFICIENCY: Chronic | ICD-10-CM

## 2021-01-14 DIAGNOSIS — M54.2 CERVICALGIA: ICD-10-CM

## 2021-01-14 DIAGNOSIS — Z79.4 TYPE 2 DIABETES MELLITUS WITH HYPERGLYCEMIA, WITH LONG-TERM CURRENT USE OF INSULIN: Chronic | ICD-10-CM

## 2021-01-14 DIAGNOSIS — E66.9 NOCTURNAL HYPOXEMIA DUE TO OBESITY: Chronic | ICD-10-CM

## 2021-01-14 DIAGNOSIS — E11.65 UNCONTROLLED TYPE 2 DIABETES MELLITUS WITH HYPERGLYCEMIA: ICD-10-CM

## 2021-01-14 DIAGNOSIS — K76.0 NAFLD (NONALCOHOLIC FATTY LIVER DISEASE): Chronic | ICD-10-CM

## 2021-01-14 DIAGNOSIS — M54.42 ACUTE BILATERAL LOW BACK PAIN WITH LEFT-SIDED SCIATICA: ICD-10-CM

## 2021-01-14 PROBLEM — M54.6 ACUTE RIGHT-SIDED THORACIC BACK PAIN: Status: RESOLVED | Noted: 2020-07-07 | Resolved: 2021-01-14

## 2021-01-14 PROBLEM — K80.20 CALCULUS OF GALLBLADDER WITHOUT CHOLECYSTITIS WITHOUT OBSTRUCTION: Status: RESOLVED | Noted: 2020-01-03 | Resolved: 2021-01-14

## 2021-01-14 PROBLEM — M25.611 DECREASED RIGHT SHOULDER RANGE OF MOTION: Status: RESOLVED | Noted: 2020-08-31 | Resolved: 2021-01-14

## 2021-01-14 PROBLEM — Z01.810 PREOP CARDIOVASCULAR EXAM: Status: RESOLVED | Noted: 2018-02-28 | Resolved: 2021-01-14

## 2021-01-14 PROBLEM — K80.20 CHOLELITHIASIS: Status: RESOLVED | Noted: 2020-01-03 | Resolved: 2021-01-14

## 2021-01-14 PROCEDURE — 97110 THERAPEUTIC EXERCISES: CPT | Performed by: PHYSICAL THERAPIST

## 2021-01-14 PROCEDURE — 99999 PR PBB SHADOW E&M-EST. PATIENT-LVL V: CPT | Mod: PBBFAC,,, | Performed by: DIETITIAN, REGISTERED

## 2021-01-14 PROCEDURE — 72050 X-RAY EXAM NECK SPINE 4/5VWS: CPT | Mod: TC

## 2021-01-14 PROCEDURE — 99215 OFFICE O/P EST HI 40 MIN: CPT | Mod: PBBFAC,25,27 | Performed by: DIETITIAN, REGISTERED

## 2021-01-14 PROCEDURE — 99214 OFFICE O/P EST MOD 30 MIN: CPT | Mod: S$PBB,,, | Performed by: NURSE PRACTITIONER

## 2021-01-14 PROCEDURE — 99213 OFFICE O/P EST LOW 20 MIN: CPT | Mod: PBBFAC,25 | Performed by: NURSE PRACTITIONER

## 2021-01-14 PROCEDURE — 99999 PR PBB SHADOW E&M-EST. PATIENT-LVL III: ICD-10-PCS | Mod: PBBFAC,,, | Performed by: NURSE PRACTITIONER

## 2021-01-14 PROCEDURE — 72050 XR CERVICAL SPINE COMPLETE 5 VIEW: ICD-10-PCS | Mod: 26,,, | Performed by: RADIOLOGY

## 2021-01-14 PROCEDURE — 99214 OFFICE O/P EST MOD 30 MIN: CPT | Mod: S$PBB,,, | Performed by: PHYSICIAN ASSISTANT

## 2021-01-14 PROCEDURE — G0108 DIAB MANAGE TRN  PER INDIV: HCPCS | Mod: PBBFAC | Performed by: DIETITIAN, REGISTERED

## 2021-01-14 PROCEDURE — 99214 PR OFFICE/OUTPT VISIT, EST, LEVL IV, 30-39 MIN: ICD-10-PCS | Mod: S$PBB,,, | Performed by: NURSE PRACTITIONER

## 2021-01-14 PROCEDURE — 99214 PR OFFICE/OUTPT VISIT, EST, LEVL IV, 30-39 MIN: ICD-10-PCS | Mod: S$PBB,,, | Performed by: PHYSICIAN ASSISTANT

## 2021-01-14 PROCEDURE — 99999 PR PBB SHADOW E&M-EST. PATIENT-LVL V: CPT | Mod: PBBFAC,,, | Performed by: PHYSICIAN ASSISTANT

## 2021-01-14 PROCEDURE — 99999 PR PBB SHADOW E&M-EST. PATIENT-LVL III: CPT | Mod: PBBFAC,,, | Performed by: NURSE PRACTITIONER

## 2021-01-14 PROCEDURE — 99215 OFFICE O/P EST HI 40 MIN: CPT | Mod: PBBFAC,27 | Performed by: PHYSICIAN ASSISTANT

## 2021-01-14 PROCEDURE — 99999 PR PBB SHADOW E&M-EST. PATIENT-LVL V: ICD-10-PCS | Mod: PBBFAC,,, | Performed by: PHYSICIAN ASSISTANT

## 2021-01-14 PROCEDURE — 72050 X-RAY EXAM NECK SPINE 4/5VWS: CPT | Mod: 26,,, | Performed by: RADIOLOGY

## 2021-01-14 PROCEDURE — 99999 PR PBB SHADOW E&M-EST. PATIENT-LVL V: ICD-10-PCS | Mod: PBBFAC,,, | Performed by: DIETITIAN, REGISTERED

## 2021-01-14 RX ORDER — VALACYCLOVIR HYDROCHLORIDE 500 MG/1
500 TABLET, FILM COATED ORAL DAILY
Qty: 90 TABLET | Refills: 3 | Status: SHIPPED | OUTPATIENT
Start: 2021-01-14 | End: 2021-05-03 | Stop reason: SDUPTHER

## 2021-01-14 RX ORDER — SULFAMETHOXAZOLE AND TRIMETHOPRIM 800; 160 MG/1; MG/1
1 TABLET ORAL 2 TIMES DAILY
Qty: 14 TABLET | Refills: 0 | Status: SHIPPED | OUTPATIENT
Start: 2021-01-14 | End: 2021-01-21

## 2021-01-15 ENCOUNTER — PATIENT MESSAGE (OUTPATIENT)
Dept: OBSTETRICS AND GYNECOLOGY | Facility: CLINIC | Age: 51
End: 2021-01-15

## 2021-01-15 RX ORDER — PROMETHAZINE HYDROCHLORIDE 25 MG/1
25 TABLET ORAL
Qty: 12 TABLET | Refills: 0 | Status: SHIPPED | OUTPATIENT
Start: 2021-01-15 | End: 2021-06-09

## 2021-01-18 ENCOUNTER — PATIENT MESSAGE (OUTPATIENT)
Dept: INTERNAL MEDICINE | Facility: CLINIC | Age: 51
End: 2021-01-18

## 2021-01-18 DIAGNOSIS — B35.4 TINEA CORPORIS: Primary | ICD-10-CM

## 2021-01-19 ENCOUNTER — CLINICAL SUPPORT (OUTPATIENT)
Dept: REHABILITATION | Facility: HOSPITAL | Age: 51
End: 2021-01-19
Payer: MEDICAID

## 2021-01-19 DIAGNOSIS — M54.42 ACUTE BILATERAL LOW BACK PAIN WITH LEFT-SIDED SCIATICA: ICD-10-CM

## 2021-01-19 DIAGNOSIS — M25.511 ACUTE PAIN OF BOTH SHOULDERS: ICD-10-CM

## 2021-01-19 DIAGNOSIS — M25.512 ACUTE PAIN OF BOTH SHOULDERS: ICD-10-CM

## 2021-01-19 PROCEDURE — 97140 MANUAL THERAPY 1/> REGIONS: CPT | Mod: CQ

## 2021-01-19 PROCEDURE — 97110 THERAPEUTIC EXERCISES: CPT | Mod: CQ

## 2021-01-19 RX ORDER — CLOTRIMAZOLE 1 %
CREAM (GRAM) TOPICAL 2 TIMES DAILY
Qty: 30 G | Refills: 1 | Status: SHIPPED | OUTPATIENT
Start: 2021-01-19 | End: 2021-07-15

## 2021-01-22 ENCOUNTER — TELEPHONE (OUTPATIENT)
Dept: ORTHOPEDICS | Facility: CLINIC | Age: 51
End: 2021-01-22

## 2021-01-25 ENCOUNTER — LAB VISIT (OUTPATIENT)
Dept: LAB | Facility: HOSPITAL | Age: 51
End: 2021-01-25
Attending: NURSE PRACTITIONER
Payer: MEDICAID

## 2021-01-25 ENCOUNTER — HOSPITAL ENCOUNTER (OUTPATIENT)
Dept: PREADMISSION TESTING | Facility: HOSPITAL | Age: 51
Discharge: HOME | End: 2021-01-25
Payer: MEDICAID

## 2021-01-25 LAB
ESTIMATED AVG GLUCOSE: 146 MG/DL (ref 68–131)
HBA1C MFR BLD HPLC: 6.7 % (ref 4–5.6)

## 2021-01-25 PROCEDURE — 83036 HEMOGLOBIN GLYCOSYLATED A1C: CPT

## 2021-01-25 PROCEDURE — 36415 COLL VENOUS BLD VENIPUNCTURE: CPT

## 2021-01-26 ENCOUNTER — OFFICE VISIT (OUTPATIENT)
Dept: PULMONOLOGY | Facility: CLINIC | Age: 51
End: 2021-01-26
Payer: MEDICAID

## 2021-01-26 ENCOUNTER — CLINICAL SUPPORT (OUTPATIENT)
Dept: PULMONOLOGY | Facility: CLINIC | Age: 51
End: 2021-01-26
Payer: MEDICAID

## 2021-01-26 VITALS
HEART RATE: 93 BPM | WEIGHT: 204.13 LBS | HEIGHT: 56 IN | OXYGEN SATURATION: 95 % | RESPIRATION RATE: 17 BRPM | BODY MASS INDEX: 45.92 KG/M2 | DIASTOLIC BLOOD PRESSURE: 84 MMHG | SYSTOLIC BLOOD PRESSURE: 126 MMHG

## 2021-01-26 DIAGNOSIS — J45.40 MODERATE PERSISTENT ASTHMA WITHOUT COMPLICATION: Primary | ICD-10-CM

## 2021-01-26 DIAGNOSIS — I15.2 HYPERTENSION COMPLICATING DIABETES: Chronic | ICD-10-CM

## 2021-01-26 DIAGNOSIS — G47.36 NOCTURNAL HYPOXEMIA DUE TO OBESITY: ICD-10-CM

## 2021-01-26 DIAGNOSIS — J30.1 SEASONAL ALLERGIC RHINITIS DUE TO POLLEN: Chronic | ICD-10-CM

## 2021-01-26 DIAGNOSIS — E66.9 NOCTURNAL HYPOXEMIA DUE TO OBESITY: ICD-10-CM

## 2021-01-26 DIAGNOSIS — G47.8 NOCTURNAL SLEEP-RELATED EATING DISORDER: ICD-10-CM

## 2021-01-26 DIAGNOSIS — E66.2 OBESITY HYPOVENTILATION SYNDROME: ICD-10-CM

## 2021-01-26 DIAGNOSIS — E66.01 MORBID OBESITY WITH BMI OF 45.0-49.9, ADULT: Chronic | ICD-10-CM

## 2021-01-26 DIAGNOSIS — E11.59 HYPERTENSION COMPLICATING DIABETES: Chronic | ICD-10-CM

## 2021-01-26 DIAGNOSIS — G47.63 SLEEP-RELATED BRUXISM: ICD-10-CM

## 2021-01-26 DIAGNOSIS — F25.0 SCHIZOAFFECTIVE DISORDER, BIPOLAR TYPE: Chronic | ICD-10-CM

## 2021-01-26 PROCEDURE — 99214 OFFICE O/P EST MOD 30 MIN: CPT | Mod: S$PBB,,, | Performed by: NURSE PRACTITIONER

## 2021-01-26 PROCEDURE — 99999 PR PBB SHADOW E&M-EST. PATIENT-LVL V: ICD-10-PCS | Mod: PBBFAC,,, | Performed by: NURSE PRACTITIONER

## 2021-01-26 PROCEDURE — 94762 N-INVAS EAR/PLS OXIMTRY CONT: CPT | Mod: PBBFAC

## 2021-01-26 PROCEDURE — 99999 PR PBB SHADOW E&M-EST. PATIENT-LVL V: CPT | Mod: PBBFAC,,, | Performed by: NURSE PRACTITIONER

## 2021-01-26 PROCEDURE — 99214 PR OFFICE/OUTPT VISIT, EST, LEVL IV, 30-39 MIN: ICD-10-PCS | Mod: S$PBB,,, | Performed by: NURSE PRACTITIONER

## 2021-01-26 PROCEDURE — 99215 OFFICE O/P EST HI 40 MIN: CPT | Mod: PBBFAC | Performed by: NURSE PRACTITIONER

## 2021-01-26 RX ORDER — FENOFIBRATE 160 MG/1
TABLET ORAL
COMMUNITY
End: 2021-05-17

## 2021-01-28 ENCOUNTER — PATIENT MESSAGE (OUTPATIENT)
Dept: ORTHOPEDICS | Facility: CLINIC | Age: 51
End: 2021-01-28

## 2021-01-28 ENCOUNTER — CLINICAL SUPPORT (OUTPATIENT)
Dept: REHABILITATION | Facility: HOSPITAL | Age: 51
End: 2021-01-28
Payer: MEDICAID

## 2021-01-28 ENCOUNTER — TELEPHONE (OUTPATIENT)
Dept: ORTHOPEDICS | Facility: CLINIC | Age: 51
End: 2021-01-28

## 2021-01-28 DIAGNOSIS — M25.512 ACUTE PAIN OF BOTH SHOULDERS: ICD-10-CM

## 2021-01-28 DIAGNOSIS — M54.42 ACUTE BILATERAL LOW BACK PAIN WITH LEFT-SIDED SCIATICA: ICD-10-CM

## 2021-01-28 DIAGNOSIS — M25.511 ACUTE PAIN OF BOTH SHOULDERS: ICD-10-CM

## 2021-01-28 PROCEDURE — 97140 MANUAL THERAPY 1/> REGIONS: CPT | Performed by: PHYSICAL THERAPIST

## 2021-01-28 PROCEDURE — 97110 THERAPEUTIC EXERCISES: CPT | Performed by: PHYSICAL THERAPIST

## 2021-01-29 ENCOUNTER — TELEPHONE (OUTPATIENT)
Dept: ORTHOPEDICS | Facility: CLINIC | Age: 51
End: 2021-01-29

## 2021-02-02 ENCOUNTER — PATIENT MESSAGE (OUTPATIENT)
Dept: DIABETES | Facility: CLINIC | Age: 51
End: 2021-02-02

## 2021-02-02 ENCOUNTER — OFFICE VISIT (OUTPATIENT)
Dept: OPHTHALMOLOGY | Facility: CLINIC | Age: 51
End: 2021-02-02
Payer: MEDICAID

## 2021-02-02 DIAGNOSIS — H52.4 MYOPIA WITH PRESBYOPIA, BILATERAL: Primary | ICD-10-CM

## 2021-02-02 DIAGNOSIS — E78.5 DYSLIPIDEMIA ASSOCIATED WITH TYPE 2 DIABETES MELLITUS: ICD-10-CM

## 2021-02-02 DIAGNOSIS — E11.59 HYPERTENSION COMPLICATING DIABETES: Chronic | ICD-10-CM

## 2021-02-02 DIAGNOSIS — E11.69 DYSLIPIDEMIA ASSOCIATED WITH TYPE 2 DIABETES MELLITUS: ICD-10-CM

## 2021-02-02 DIAGNOSIS — I15.2 HYPERTENSION COMPLICATING DIABETES: Chronic | ICD-10-CM

## 2021-02-02 DIAGNOSIS — H52.13 MYOPIA WITH PRESBYOPIA, BILATERAL: Primary | ICD-10-CM

## 2021-02-02 PROBLEM — Z86.59 HISTORY OF ANOREXIA NERVOSA: Status: ACTIVE | Noted: 2021-01-27

## 2021-02-02 PROBLEM — G47.39 OTHER SLEEP APNEA: Status: ACTIVE | Noted: 2021-01-27

## 2021-02-02 PROBLEM — E11.9 TYPE 2 DIABETES MELLITUS: Status: ACTIVE | Noted: 2017-08-24

## 2021-02-02 PROBLEM — F31.5 BIPOLAR DISORD, CRNT EPSD DEPRESS, SEVERE, W PSYCH FEATURES: Status: ACTIVE | Noted: 2019-04-10

## 2021-02-02 PROBLEM — J45.909 ASTHMA: Status: ACTIVE | Noted: 2021-02-02

## 2021-02-02 PROCEDURE — 99999 PR PBB SHADOW E&M-EST. PATIENT-LVL IV: CPT | Mod: PBBFAC,,, | Performed by: OPTOMETRIST

## 2021-02-02 PROCEDURE — 99499 NO LOS: ICD-10-PCS | Mod: S$PBB,,, | Performed by: OPTOMETRIST

## 2021-02-02 PROCEDURE — 99499 UNLISTED E&M SERVICE: CPT | Mod: S$PBB,,, | Performed by: OPTOMETRIST

## 2021-02-02 PROCEDURE — 99999 PR PBB SHADOW E&M-EST. PATIENT-LVL IV: ICD-10-PCS | Mod: PBBFAC,,, | Performed by: OPTOMETRIST

## 2021-02-02 PROCEDURE — 99214 OFFICE O/P EST MOD 30 MIN: CPT | Mod: PBBFAC | Performed by: OPTOMETRIST

## 2021-02-02 RX ORDER — AMLODIPINE BESYLATE 10 MG/1
10 TABLET ORAL NIGHTLY
Qty: 30 TABLET | Refills: 11 | Status: SHIPPED | OUTPATIENT
Start: 2021-02-02 | End: 2021-03-11 | Stop reason: SDUPTHER

## 2021-02-02 RX ORDER — AMLODIPINE BESYLATE 5 MG/1
5 TABLET ORAL NIGHTLY
Qty: 30 TABLET | Refills: 11 | Status: SHIPPED | OUTPATIENT
Start: 2021-02-02 | End: 2022-02-03 | Stop reason: SDUPTHER

## 2021-02-02 RX ORDER — ATORVASTATIN CALCIUM 20 MG/1
20 TABLET, FILM COATED ORAL NIGHTLY
Qty: 30 TABLET | Refills: 0 | OUTPATIENT
Start: 2021-02-02 | End: 2021-03-04

## 2021-02-03 ENCOUNTER — TELEPHONE (OUTPATIENT)
Dept: INTERNAL MEDICINE | Facility: CLINIC | Age: 51
End: 2021-02-03

## 2021-02-03 ENCOUNTER — PATIENT MESSAGE (OUTPATIENT)
Dept: OBSTETRICS AND GYNECOLOGY | Facility: CLINIC | Age: 51
End: 2021-02-03

## 2021-02-04 DIAGNOSIS — E78.5 DYSLIPIDEMIA ASSOCIATED WITH TYPE 2 DIABETES MELLITUS: ICD-10-CM

## 2021-02-04 DIAGNOSIS — E11.69 DYSLIPIDEMIA ASSOCIATED WITH TYPE 2 DIABETES MELLITUS: ICD-10-CM

## 2021-02-04 RX ORDER — ATORVASTATIN CALCIUM 20 MG/1
20 TABLET, FILM COATED ORAL NIGHTLY
Qty: 30 TABLET | Refills: 0 | Status: SHIPPED | OUTPATIENT
Start: 2021-02-04 | End: 2021-07-15

## 2021-02-09 ENCOUNTER — OFFICE VISIT (OUTPATIENT)
Dept: ORTHOPEDICS | Facility: CLINIC | Age: 51
End: 2021-02-09
Payer: MEDICAID

## 2021-02-09 VITALS — WEIGHT: 204 LBS | BODY MASS INDEX: 45.89 KG/M2 | HEIGHT: 56 IN

## 2021-02-09 DIAGNOSIS — R14.0 BLOATING: ICD-10-CM

## 2021-02-09 DIAGNOSIS — R10.9 ABDOMINAL PAIN, UNSPECIFIED ABDOMINAL LOCATION: ICD-10-CM

## 2021-02-09 DIAGNOSIS — M67.819 TENDINOSIS OF ROTATOR CUFF: Primary | ICD-10-CM

## 2021-02-09 DIAGNOSIS — K59.00 CONSTIPATION, UNSPECIFIED CONSTIPATION TYPE: ICD-10-CM

## 2021-02-09 PROCEDURE — 99214 OFFICE O/P EST MOD 30 MIN: CPT | Mod: S$PBB,25,, | Performed by: STUDENT IN AN ORGANIZED HEALTH CARE EDUCATION/TRAINING PROGRAM

## 2021-02-09 PROCEDURE — 20610 DRAIN/INJ JOINT/BURSA W/O US: CPT | Mod: PBBFAC | Performed by: STUDENT IN AN ORGANIZED HEALTH CARE EDUCATION/TRAINING PROGRAM

## 2021-02-09 PROCEDURE — 99214 PR OFFICE/OUTPT VISIT, EST, LEVL IV, 30-39 MIN: ICD-10-PCS | Mod: S$PBB,25,, | Performed by: STUDENT IN AN ORGANIZED HEALTH CARE EDUCATION/TRAINING PROGRAM

## 2021-02-09 PROCEDURE — 99999 PR PBB SHADOW E&M-EST. PATIENT-LVL IV: ICD-10-PCS | Mod: PBBFAC,,, | Performed by: STUDENT IN AN ORGANIZED HEALTH CARE EDUCATION/TRAINING PROGRAM

## 2021-02-09 PROCEDURE — 99999 PR PBB SHADOW E&M-EST. PATIENT-LVL IV: CPT | Mod: PBBFAC,,, | Performed by: STUDENT IN AN ORGANIZED HEALTH CARE EDUCATION/TRAINING PROGRAM

## 2021-02-09 PROCEDURE — 99214 OFFICE O/P EST MOD 30 MIN: CPT | Mod: PBBFAC | Performed by: STUDENT IN AN ORGANIZED HEALTH CARE EDUCATION/TRAINING PROGRAM

## 2021-02-09 PROCEDURE — 20610 LARGE JOINT ASPIRATION/INJECTION: R GLENOHUMERAL: ICD-10-PCS | Mod: S$PBB,RT,, | Performed by: STUDENT IN AN ORGANIZED HEALTH CARE EDUCATION/TRAINING PROGRAM

## 2021-02-09 RX ORDER — TRIAMCINOLONE ACETONIDE 40 MG/ML
40 INJECTION, SUSPENSION INTRA-ARTICULAR; INTRAMUSCULAR
Status: DISCONTINUED | OUTPATIENT
Start: 2021-02-09 | End: 2021-02-09 | Stop reason: HOSPADM

## 2021-02-09 RX ORDER — LUBIPROSTONE 24 UG/1
24 CAPSULE ORAL 2 TIMES DAILY
Qty: 60 CAPSULE | Refills: 2 | Status: SHIPPED | OUTPATIENT
Start: 2021-02-09 | End: 2021-06-09

## 2021-02-09 RX ADMIN — TRIAMCINOLONE ACETONIDE 40 MG: 200 INJECTION, SUSPENSION INTRA-ARTICULAR; INTRAMUSCULAR at 07:02

## 2021-02-18 ENCOUNTER — DOCUMENTATION ONLY (OUTPATIENT)
Dept: REHABILITATION | Facility: HOSPITAL | Age: 51
End: 2021-02-18

## 2021-02-18 ENCOUNTER — CLINICAL SUPPORT (OUTPATIENT)
Dept: REHABILITATION | Facility: HOSPITAL | Age: 51
End: 2021-02-18
Payer: MEDICAID

## 2021-02-18 DIAGNOSIS — M25.512 ACUTE PAIN OF BOTH SHOULDERS: ICD-10-CM

## 2021-02-18 DIAGNOSIS — M54.42 ACUTE BILATERAL LOW BACK PAIN WITH LEFT-SIDED SCIATICA: ICD-10-CM

## 2021-02-18 DIAGNOSIS — M25.511 ACUTE PAIN OF BOTH SHOULDERS: ICD-10-CM

## 2021-02-18 PROCEDURE — 97110 THERAPEUTIC EXERCISES: CPT | Mod: CQ

## 2021-02-23 RX ORDER — INSULIN GLARGINE 300 U/ML
108 INJECTION, SOLUTION SUBCUTANEOUS NIGHTLY
Qty: 36 ML | Refills: 0 | Status: SHIPPED | OUTPATIENT
Start: 2021-02-23 | End: 2021-07-13 | Stop reason: SDUPTHER

## 2021-02-23 RX ORDER — INSULIN ASPART 100 [IU]/ML
INJECTION, SOLUTION INTRAVENOUS; SUBCUTANEOUS
Qty: 45 ML | Refills: 0 | Status: SHIPPED | OUTPATIENT
Start: 2021-02-23 | End: 2021-03-11 | Stop reason: SDUPTHER

## 2021-02-23 RX ORDER — INSULIN ASPART 100 [IU]/ML
INJECTION, SOLUTION INTRAVENOUS; SUBCUTANEOUS
Qty: 30 ML | Refills: 0 | Status: SHIPPED | OUTPATIENT
Start: 2021-02-23 | End: 2021-03-24 | Stop reason: SDUPTHER

## 2021-02-24 ENCOUNTER — CLINICAL SUPPORT (OUTPATIENT)
Dept: REHABILITATION | Facility: HOSPITAL | Age: 51
End: 2021-02-24
Payer: MEDICAID

## 2021-02-24 DIAGNOSIS — M54.42 ACUTE BILATERAL LOW BACK PAIN WITH LEFT-SIDED SCIATICA: ICD-10-CM

## 2021-02-24 DIAGNOSIS — M25.512 ACUTE PAIN OF BOTH SHOULDERS: ICD-10-CM

## 2021-02-24 DIAGNOSIS — M25.511 ACUTE PAIN OF BOTH SHOULDERS: ICD-10-CM

## 2021-02-24 PROCEDURE — 97110 THERAPEUTIC EXERCISES: CPT | Performed by: PHYSICAL THERAPIST

## 2021-03-03 ENCOUNTER — TELEPHONE (OUTPATIENT)
Dept: INTERNAL MEDICINE | Facility: CLINIC | Age: 51
End: 2021-03-03

## 2021-03-03 ENCOUNTER — PATIENT MESSAGE (OUTPATIENT)
Dept: INTERNAL MEDICINE | Facility: CLINIC | Age: 51
End: 2021-03-03

## 2021-03-05 ENCOUNTER — DOCUMENTATION ONLY (OUTPATIENT)
Dept: REHABILITATION | Facility: HOSPITAL | Age: 51
End: 2021-03-05

## 2021-03-05 ENCOUNTER — CLINICAL SUPPORT (OUTPATIENT)
Dept: REHABILITATION | Facility: HOSPITAL | Age: 51
End: 2021-03-05
Payer: MEDICAID

## 2021-03-05 DIAGNOSIS — M54.42 ACUTE BILATERAL LOW BACK PAIN WITH LEFT-SIDED SCIATICA: ICD-10-CM

## 2021-03-05 DIAGNOSIS — M25.511 ACUTE PAIN OF BOTH SHOULDERS: ICD-10-CM

## 2021-03-05 DIAGNOSIS — M25.512 ACUTE PAIN OF BOTH SHOULDERS: ICD-10-CM

## 2021-03-05 PROCEDURE — 97110 THERAPEUTIC EXERCISES: CPT | Mod: CQ

## 2021-03-08 ENCOUNTER — TELEPHONE (OUTPATIENT)
Dept: PAIN MEDICINE | Facility: CLINIC | Age: 51
End: 2021-03-08

## 2021-03-11 ENCOUNTER — OFFICE VISIT (OUTPATIENT)
Dept: CARDIOLOGY | Facility: CLINIC | Age: 51
End: 2021-03-11
Payer: MEDICAID

## 2021-03-11 VITALS
HEART RATE: 96 BPM | OXYGEN SATURATION: 100 % | BODY MASS INDEX: 45.98 KG/M2 | HEIGHT: 56 IN | DIASTOLIC BLOOD PRESSURE: 60 MMHG | WEIGHT: 204.38 LBS | SYSTOLIC BLOOD PRESSURE: 122 MMHG

## 2021-03-11 DIAGNOSIS — E66.01 MORBID OBESITY WITH BMI OF 45.0-49.9, ADULT: Chronic | ICD-10-CM

## 2021-03-11 DIAGNOSIS — E11.69 DYSLIPIDEMIA ASSOCIATED WITH TYPE 2 DIABETES MELLITUS: Chronic | ICD-10-CM

## 2021-03-11 DIAGNOSIS — E78.5 DYSLIPIDEMIA ASSOCIATED WITH TYPE 2 DIABETES MELLITUS: Chronic | ICD-10-CM

## 2021-03-11 DIAGNOSIS — K76.0 NAFLD (NONALCOHOLIC FATTY LIVER DISEASE): Chronic | ICD-10-CM

## 2021-03-11 DIAGNOSIS — I15.2 HYPERTENSION COMPLICATING DIABETES: Primary | Chronic | ICD-10-CM

## 2021-03-11 DIAGNOSIS — E66.2 OBESITY HYPOVENTILATION SYNDROME: ICD-10-CM

## 2021-03-11 DIAGNOSIS — F25.0 SCHIZOAFFECTIVE DISORDER, BIPOLAR TYPE: Chronic | ICD-10-CM

## 2021-03-11 DIAGNOSIS — E11.59 HYPERTENSION COMPLICATING DIABETES: Primary | Chronic | ICD-10-CM

## 2021-03-11 DIAGNOSIS — E78.1 HYPERTRIGLYCERIDEMIA: Chronic | ICD-10-CM

## 2021-03-11 DIAGNOSIS — R94.31 ABNORMAL ECG: ICD-10-CM

## 2021-03-11 PROCEDURE — 99999 PR PBB SHADOW E&M-EST. PATIENT-LVL V: CPT | Mod: PBBFAC,,, | Performed by: PHYSICIAN ASSISTANT

## 2021-03-11 PROCEDURE — 99214 OFFICE O/P EST MOD 30 MIN: CPT | Mod: S$PBB,,, | Performed by: PHYSICIAN ASSISTANT

## 2021-03-11 PROCEDURE — 99214 PR OFFICE/OUTPT VISIT, EST, LEVL IV, 30-39 MIN: ICD-10-PCS | Mod: S$PBB,,, | Performed by: PHYSICIAN ASSISTANT

## 2021-03-11 PROCEDURE — 99999 PR PBB SHADOW E&M-EST. PATIENT-LVL V: ICD-10-PCS | Mod: PBBFAC,,, | Performed by: PHYSICIAN ASSISTANT

## 2021-03-11 PROCEDURE — 99215 OFFICE O/P EST HI 40 MIN: CPT | Mod: PBBFAC | Performed by: PHYSICIAN ASSISTANT

## 2021-03-11 RX ORDER — AMLODIPINE BESYLATE 5 MG/1
5 TABLET ORAL DAILY
COMMUNITY
End: 2021-04-26 | Stop reason: SDUPTHER

## 2021-03-17 ENCOUNTER — PATIENT OUTREACH (OUTPATIENT)
Dept: PULMONOLOGY | Facility: CLINIC | Age: 51
End: 2021-03-17

## 2021-03-17 ENCOUNTER — TELEPHONE (OUTPATIENT)
Dept: DIABETES | Facility: CLINIC | Age: 51
End: 2021-03-17

## 2021-03-18 ENCOUNTER — TELEPHONE (OUTPATIENT)
Dept: PAIN MEDICINE | Facility: CLINIC | Age: 51
End: 2021-03-18

## 2021-03-19 ENCOUNTER — CLINICAL SUPPORT (OUTPATIENT)
Dept: REHABILITATION | Facility: HOSPITAL | Age: 51
End: 2021-03-19
Payer: MEDICAID

## 2021-03-19 DIAGNOSIS — M25.512 ACUTE PAIN OF BOTH SHOULDERS: ICD-10-CM

## 2021-03-19 DIAGNOSIS — M25.511 ACUTE PAIN OF BOTH SHOULDERS: ICD-10-CM

## 2021-03-19 DIAGNOSIS — M54.42 ACUTE BILATERAL LOW BACK PAIN WITH LEFT-SIDED SCIATICA: ICD-10-CM

## 2021-03-19 PROCEDURE — 97140 MANUAL THERAPY 1/> REGIONS: CPT | Performed by: PHYSICAL THERAPIST

## 2021-03-23 ENCOUNTER — PATIENT MESSAGE (OUTPATIENT)
Dept: PAIN MEDICINE | Facility: CLINIC | Age: 51
End: 2021-03-23

## 2021-03-23 ENCOUNTER — OFFICE VISIT (OUTPATIENT)
Dept: PAIN MEDICINE | Facility: CLINIC | Age: 51
End: 2021-03-23
Payer: MEDICAID

## 2021-03-23 VITALS
BODY MASS INDEX: 45.98 KG/M2 | WEIGHT: 204.38 LBS | HEART RATE: 98 BPM | DIASTOLIC BLOOD PRESSURE: 84 MMHG | SYSTOLIC BLOOD PRESSURE: 137 MMHG | HEIGHT: 56 IN

## 2021-03-23 DIAGNOSIS — M47.816 LUMBAR FACET ARTHROPATHY: ICD-10-CM

## 2021-03-23 DIAGNOSIS — M79.18 MYOFASCIAL MUSCLE PAIN: ICD-10-CM

## 2021-03-23 DIAGNOSIS — M25.512 CHRONIC PAIN OF BOTH SHOULDERS: ICD-10-CM

## 2021-03-23 DIAGNOSIS — M47.816 LUMBAR SPONDYLOSIS: ICD-10-CM

## 2021-03-23 DIAGNOSIS — M25.511 CHRONIC PAIN OF BOTH SHOULDERS: ICD-10-CM

## 2021-03-23 DIAGNOSIS — M79.7 FIBROMYALGIA: Primary | ICD-10-CM

## 2021-03-23 DIAGNOSIS — G89.29 CHRONIC PAIN OF BOTH SHOULDERS: ICD-10-CM

## 2021-03-23 PROBLEM — M54.42 ACUTE BILATERAL LOW BACK PAIN WITH LEFT-SIDED SCIATICA: Status: RESOLVED | Noted: 2020-12-30 | Resolved: 2021-03-23

## 2021-03-23 PROCEDURE — 99999 PR PBB SHADOW E&M-EST. PATIENT-LVL III: ICD-10-PCS | Mod: PBBFAC,,, | Performed by: ANESTHESIOLOGY

## 2021-03-23 PROCEDURE — 99999 PR PBB SHADOW E&M-EST. PATIENT-LVL III: CPT | Mod: PBBFAC,,, | Performed by: ANESTHESIOLOGY

## 2021-03-23 PROCEDURE — 99214 PR OFFICE/OUTPT VISIT, EST, LEVL IV, 30-39 MIN: ICD-10-PCS | Mod: S$PBB,,, | Performed by: ANESTHESIOLOGY

## 2021-03-23 PROCEDURE — 99214 OFFICE O/P EST MOD 30 MIN: CPT | Mod: S$PBB,,, | Performed by: ANESTHESIOLOGY

## 2021-03-23 PROCEDURE — 99213 OFFICE O/P EST LOW 20 MIN: CPT | Mod: PBBFAC | Performed by: ANESTHESIOLOGY

## 2021-03-24 ENCOUNTER — LAB VISIT (OUTPATIENT)
Dept: LAB | Facility: HOSPITAL | Age: 51
End: 2021-03-24
Attending: PHYSICIAN ASSISTANT
Payer: COMMERCIAL

## 2021-03-24 DIAGNOSIS — E78.1 HYPERTRIGLYCERIDEMIA: Chronic | ICD-10-CM

## 2021-03-24 DIAGNOSIS — K76.0 NAFLD (NONALCOHOLIC FATTY LIVER DISEASE): Chronic | ICD-10-CM

## 2021-03-24 DIAGNOSIS — I15.2 HYPERTENSION COMPLICATING DIABETES: Chronic | ICD-10-CM

## 2021-03-24 DIAGNOSIS — F25.0 SCHIZOAFFECTIVE DISORDER, BIPOLAR TYPE: Chronic | ICD-10-CM

## 2021-03-24 DIAGNOSIS — E11.69 DYSLIPIDEMIA ASSOCIATED WITH TYPE 2 DIABETES MELLITUS: Chronic | ICD-10-CM

## 2021-03-24 DIAGNOSIS — E66.2 OBESITY HYPOVENTILATION SYNDROME: ICD-10-CM

## 2021-03-24 DIAGNOSIS — E78.5 DYSLIPIDEMIA ASSOCIATED WITH TYPE 2 DIABETES MELLITUS: Chronic | ICD-10-CM

## 2021-03-24 DIAGNOSIS — E11.59 HYPERTENSION COMPLICATING DIABETES: Chronic | ICD-10-CM

## 2021-03-24 LAB
ALBUMIN SERPL BCP-MCNC: 3.5 G/DL (ref 3.5–5.2)
ALP SERPL-CCNC: 105 U/L (ref 55–135)
ALT SERPL W/O P-5'-P-CCNC: 21 U/L (ref 10–44)
ANION GAP SERPL CALC-SCNC: 6 MMOL/L (ref 8–16)
AST SERPL-CCNC: 13 U/L (ref 10–40)
BILIRUB SERPL-MCNC: 0.3 MG/DL (ref 0.1–1)
BUN SERPL-MCNC: 10 MG/DL (ref 6–20)
CALCIUM SERPL-MCNC: 9.4 MG/DL (ref 8.7–10.5)
CHLORIDE SERPL-SCNC: 101 MMOL/L (ref 95–110)
CHOLEST SERPL-MCNC: 140 MG/DL (ref 120–199)
CHOLEST/HDLC SERPL: 3.3 {RATIO} (ref 2–5)
CO2 SERPL-SCNC: 32 MMOL/L (ref 23–29)
CREAT SERPL-MCNC: 0.7 MG/DL (ref 0.5–1.4)
EST. GFR  (AFRICAN AMERICAN): >60 ML/MIN/1.73 M^2
EST. GFR  (NON AFRICAN AMERICAN): >60 ML/MIN/1.73 M^2
ESTIMATED AVG GLUCOSE: 131 MG/DL (ref 68–131)
GLUCOSE SERPL-MCNC: 120 MG/DL (ref 70–110)
HBA1C MFR BLD: 6.2 % (ref 4–5.6)
HDLC SERPL-MCNC: 43 MG/DL (ref 40–75)
HDLC SERPL: 30.7 % (ref 20–50)
LDLC SERPL CALC-MCNC: 66 MG/DL (ref 63–159)
NONHDLC SERPL-MCNC: 97 MG/DL
POTASSIUM SERPL-SCNC: 4.9 MMOL/L (ref 3.5–5.1)
PROT SERPL-MCNC: 7.2 G/DL (ref 6–8.4)
SODIUM SERPL-SCNC: 139 MMOL/L (ref 136–145)
TRIGL SERPL-MCNC: 155 MG/DL (ref 30–150)
TSH SERPL DL<=0.005 MIU/L-ACNC: 1.73 UIU/ML (ref 0.4–4)

## 2021-03-24 PROCEDURE — 36415 COLL VENOUS BLD VENIPUNCTURE: CPT | Performed by: PHYSICIAN ASSISTANT

## 2021-03-24 PROCEDURE — 80053 COMPREHEN METABOLIC PANEL: CPT | Performed by: PHYSICIAN ASSISTANT

## 2021-03-24 PROCEDURE — 80061 LIPID PANEL: CPT | Performed by: PHYSICIAN ASSISTANT

## 2021-03-24 PROCEDURE — 83036 HEMOGLOBIN GLYCOSYLATED A1C: CPT | Performed by: PHYSICIAN ASSISTANT

## 2021-03-24 PROCEDURE — 84443 ASSAY THYROID STIM HORMONE: CPT | Performed by: PHYSICIAN ASSISTANT

## 2021-03-24 RX ORDER — INSULIN ASPART 100 [IU]/ML
INJECTION, SOLUTION INTRAVENOUS; SUBCUTANEOUS
Qty: 30 ML | Refills: 3 | Status: SHIPPED | OUTPATIENT
Start: 2021-03-24 | End: 2021-08-13 | Stop reason: SDUPTHER

## 2021-03-29 ENCOUNTER — TELEPHONE (OUTPATIENT)
Dept: PREADMISSION TESTING | Facility: HOSPITAL | Age: 51
End: 2021-03-29

## 2021-03-29 ENCOUNTER — TELEPHONE (OUTPATIENT)
Dept: CARDIOLOGY | Facility: CLINIC | Age: 51
End: 2021-03-29

## 2021-03-29 DIAGNOSIS — Z01.818 PRE-OP TESTING: Primary | ICD-10-CM

## 2021-03-30 DIAGNOSIS — E11.59 HYPERTENSION COMPLICATING DIABETES: Chronic | ICD-10-CM

## 2021-03-30 DIAGNOSIS — I15.2 HYPERTENSION COMPLICATING DIABETES: Chronic | ICD-10-CM

## 2021-03-30 RX ORDER — AMLODIPINE BESYLATE 10 MG/1
10 TABLET ORAL DAILY
Qty: 30 TABLET | Refills: 11 | Status: SHIPPED | OUTPATIENT
Start: 2021-03-30 | End: 2022-04-09

## 2021-04-01 ENCOUNTER — TELEPHONE (OUTPATIENT)
Dept: ORTHOPEDICS | Facility: CLINIC | Age: 51
End: 2021-04-01

## 2021-04-05 ENCOUNTER — HOSPITAL ENCOUNTER (OUTPATIENT)
Dept: PREADMISSION TESTING | Facility: HOSPITAL | Age: 51
Discharge: HOME | End: 2021-04-05
Payer: MEDICAID

## 2021-04-05 DIAGNOSIS — I15.2 HYPERTENSION COMPLICATING DIABETES: Chronic | ICD-10-CM

## 2021-04-05 DIAGNOSIS — G56.03 BILATERAL CARPAL TUNNEL SYNDROME: ICD-10-CM

## 2021-04-05 DIAGNOSIS — E11.59 HYPERTENSION COMPLICATING DIABETES: Chronic | ICD-10-CM

## 2021-04-12 ENCOUNTER — TELEPHONE (OUTPATIENT)
Dept: INTERNAL MEDICINE | Facility: CLINIC | Age: 51
End: 2021-04-12

## 2021-04-19 ENCOUNTER — PATIENT MESSAGE (OUTPATIENT)
Dept: DIABETES | Facility: CLINIC | Age: 51
End: 2021-04-19

## 2021-04-19 DIAGNOSIS — E11.65 UNCONTROLLED TYPE 2 DIABETES MELLITUS WITH HYPERGLYCEMIA: Primary | ICD-10-CM

## 2021-04-20 ENCOUNTER — TELEPHONE (OUTPATIENT)
Dept: INTERNAL MEDICINE | Facility: CLINIC | Age: 51
End: 2021-04-20

## 2021-04-21 ENCOUNTER — OFFICE VISIT (OUTPATIENT)
Dept: DIABETES | Facility: CLINIC | Age: 51
End: 2021-04-21
Payer: MEDICAID

## 2021-04-21 ENCOUNTER — TELEPHONE (OUTPATIENT)
Dept: INTERNAL MEDICINE | Facility: CLINIC | Age: 51
End: 2021-04-21

## 2021-04-21 DIAGNOSIS — E11.65 UNCONTROLLED TYPE 2 DIABETES MELLITUS WITH HYPERGLYCEMIA: Primary | ICD-10-CM

## 2021-04-21 PROCEDURE — 99214 OFFICE O/P EST MOD 30 MIN: CPT | Mod: 95,,, | Performed by: NURSE PRACTITIONER

## 2021-04-21 PROCEDURE — 99214 PR OFFICE/OUTPT VISIT, EST, LEVL IV, 30-39 MIN: ICD-10-PCS | Mod: 95,,, | Performed by: NURSE PRACTITIONER

## 2021-04-22 ENCOUNTER — TELEPHONE (OUTPATIENT)
Dept: INTERNAL MEDICINE | Facility: CLINIC | Age: 51
End: 2021-04-22

## 2021-04-23 ENCOUNTER — TELEPHONE (OUTPATIENT)
Dept: INTERNAL MEDICINE | Facility: CLINIC | Age: 51
End: 2021-04-23

## 2021-04-26 ENCOUNTER — HOSPITAL ENCOUNTER (OUTPATIENT)
Dept: RADIOLOGY | Facility: HOSPITAL | Age: 51
Discharge: HOME | End: 2021-04-26
Attending: NURSE PRACTITIONER
Payer: MEDICAID

## 2021-04-26 ENCOUNTER — OFFICE VISIT (OUTPATIENT)
Dept: PRIMARY CARE CLINIC | Facility: CLINIC | Age: 51
End: 2021-04-26
Payer: MEDICAID

## 2021-04-26 ENCOUNTER — IMMUNIZATION (OUTPATIENT)
Dept: PHARMACY | Facility: CLINIC | Age: 51
End: 2021-04-26
Payer: MEDICAID

## 2021-04-26 VITALS
TEMPERATURE: 98 F | DIASTOLIC BLOOD PRESSURE: 66 MMHG | OXYGEN SATURATION: 96 % | SYSTOLIC BLOOD PRESSURE: 120 MMHG | WEIGHT: 206.38 LBS | HEART RATE: 89 BPM | BODY MASS INDEX: 46.42 KG/M2 | HEIGHT: 56 IN

## 2021-04-26 DIAGNOSIS — Z23 NEED FOR VACCINATION: Primary | ICD-10-CM

## 2021-04-26 DIAGNOSIS — R30.0 DYSURIA: ICD-10-CM

## 2021-04-26 DIAGNOSIS — R10.9 FLANK PAIN: ICD-10-CM

## 2021-04-26 DIAGNOSIS — R19.7 DIARRHEA, UNSPECIFIED TYPE: ICD-10-CM

## 2021-04-26 DIAGNOSIS — I10 ESSENTIAL HYPERTENSION: Chronic | ICD-10-CM

## 2021-04-26 DIAGNOSIS — E26.9 HYPERALDOSTERONISM: ICD-10-CM

## 2021-04-26 DIAGNOSIS — K58.2 IRRITABLE BOWEL SYNDROME WITH BOTH CONSTIPATION AND DIARRHEA: Primary | ICD-10-CM

## 2021-04-26 LAB
BILIRUB SERPL-MCNC: NORMAL MG/DL
BLOOD URINE, POC: NORMAL
CLARITY, POC UA: CLEAR
COLOR, POC UA: YELLOW
GLUCOSE UR QL STRIP: NORMAL
KETONES UR QL STRIP: NORMAL
LEUKOCYTE ESTERASE URINE, POC: NORMAL
NITRITE, POC UA: NORMAL
PH, POC UA: 5
PROTEIN, POC: NORMAL
SPECIFIC GRAVITY, POC UA: 1.01
UROBILINOGEN, POC UA: 0.2

## 2021-04-26 PROCEDURE — 74018 RADEX ABDOMEN 1 VIEW: CPT | Mod: 26,,, | Performed by: RADIOLOGY

## 2021-04-26 PROCEDURE — 99213 PR OFFICE/OUTPT VISIT, EST, LEVL III, 20-29 MIN: ICD-10-PCS | Mod: S$PBB,,, | Performed by: NURSE PRACTITIONER

## 2021-04-26 PROCEDURE — 99213 OFFICE O/P EST LOW 20 MIN: CPT | Mod: S$PBB,,, | Performed by: NURSE PRACTITIONER

## 2021-04-26 PROCEDURE — 74018 RADEX ABDOMEN 1 VIEW: CPT | Mod: TC,PN

## 2021-04-26 PROCEDURE — 74018 XR ABDOMEN AP 1 VIEW: ICD-10-PCS | Mod: 26,,, | Performed by: RADIOLOGY

## 2021-04-26 RX ORDER — METRONIDAZOLE 500 MG/1
500 TABLET ORAL EVERY 12 HOURS
Qty: 14 TABLET | Refills: 0 | Status: SHIPPED | OUTPATIENT
Start: 2021-04-26 | End: 2021-05-03

## 2021-04-26 RX ORDER — LACTULOSE 10 G/15ML
30 SOLUTION ORAL; RECTAL DAILY PRN
Qty: 240 ML | Refills: 0 | Status: SHIPPED | OUTPATIENT
Start: 2021-04-26 | End: 2021-06-09

## 2021-04-26 RX ORDER — SPIRONOLACTONE 25 MG/1
25 TABLET ORAL DAILY
Qty: 30 TABLET | Refills: 6 | Status: SHIPPED | OUTPATIENT
Start: 2021-04-26 | End: 2021-12-23 | Stop reason: SDUPTHER

## 2021-04-27 DIAGNOSIS — E11.65 UNCONTROLLED TYPE 2 DIABETES MELLITUS WITH HYPERGLYCEMIA: Primary | ICD-10-CM

## 2021-04-27 RX ORDER — EXENATIDE 2 MG/.85ML
2 INJECTION, SUSPENSION, EXTENDED RELEASE SUBCUTANEOUS
Qty: 4 PEN | Refills: 3 | Status: SHIPPED | OUTPATIENT
Start: 2021-04-27 | End: 2021-06-09

## 2021-05-03 ENCOUNTER — OFFICE VISIT (OUTPATIENT)
Dept: OBSTETRICS AND GYNECOLOGY | Facility: CLINIC | Age: 51
End: 2021-05-03
Payer: MEDICAID

## 2021-05-03 VITALS
HEIGHT: 56 IN | WEIGHT: 208 LBS | DIASTOLIC BLOOD PRESSURE: 72 MMHG | BODY MASS INDEX: 46.79 KG/M2 | SYSTOLIC BLOOD PRESSURE: 120 MMHG

## 2021-05-03 DIAGNOSIS — B00.9 HSV INFECTION: ICD-10-CM

## 2021-05-03 DIAGNOSIS — Z01.419 ENCOUNTER FOR GYNECOLOGICAL EXAMINATION WITHOUT ABNORMAL FINDING: Primary | ICD-10-CM

## 2021-05-03 DIAGNOSIS — Z12.31 ENCOUNTER FOR SCREENING MAMMOGRAM FOR BREAST CANCER: ICD-10-CM

## 2021-05-03 DIAGNOSIS — B00.9 HERPES: ICD-10-CM

## 2021-05-03 DIAGNOSIS — Z11.3 SCREENING FOR STD (SEXUALLY TRANSMITTED DISEASE): ICD-10-CM

## 2021-05-03 PROCEDURE — 99396 PREV VISIT EST AGE 40-64: CPT | Mod: S$PBB,,, | Performed by: NURSE PRACTITIONER

## 2021-05-03 PROCEDURE — 99396 PR PREVENTIVE VISIT,EST,40-64: ICD-10-PCS | Mod: S$PBB,,, | Performed by: NURSE PRACTITIONER

## 2021-05-03 RX ORDER — VALACYCLOVIR HYDROCHLORIDE 500 MG/1
500 TABLET, FILM COATED ORAL DAILY
Qty: 90 TABLET | Refills: 3 | Status: SHIPPED | OUTPATIENT
Start: 2021-05-03 | End: 2021-06-09

## 2021-05-04 ENCOUNTER — TELEPHONE (OUTPATIENT)
Dept: PULMONOLOGY | Facility: CLINIC | Age: 51
End: 2021-05-04

## 2021-05-04 ENCOUNTER — TELEPHONE (OUTPATIENT)
Dept: PHARMACY | Facility: CLINIC | Age: 51
End: 2021-05-04

## 2021-05-05 ENCOUNTER — TELEPHONE (OUTPATIENT)
Dept: CARDIOLOGY | Facility: CLINIC | Age: 51
End: 2021-05-05

## 2021-05-05 ENCOUNTER — CLINICAL SUPPORT (OUTPATIENT)
Dept: DIABETES | Facility: CLINIC | Age: 51
End: 2021-05-05
Payer: MEDICAID

## 2021-05-05 ENCOUNTER — TELEPHONE (OUTPATIENT)
Dept: GASTROENTEROLOGY | Facility: CLINIC | Age: 51
End: 2021-05-05

## 2021-05-05 ENCOUNTER — CLINICAL SUPPORT (OUTPATIENT)
Dept: PULMONOLOGY | Facility: CLINIC | Age: 51
End: 2021-05-05
Payer: MEDICAID

## 2021-05-05 ENCOUNTER — TELEPHONE (OUTPATIENT)
Dept: INTERNAL MEDICINE | Facility: CLINIC | Age: 51
End: 2021-05-05

## 2021-05-05 ENCOUNTER — PATIENT MESSAGE (OUTPATIENT)
Dept: INTERNAL MEDICINE | Facility: CLINIC | Age: 51
End: 2021-05-05

## 2021-05-05 VITALS
HEART RATE: 92 BPM | BODY MASS INDEX: 46.62 KG/M2 | OXYGEN SATURATION: 95 % | HEIGHT: 56 IN | WEIGHT: 207.25 LBS | RESPIRATION RATE: 16 BRPM

## 2021-05-05 VITALS — BODY MASS INDEX: 46.51 KG/M2 | WEIGHT: 207.44 LBS

## 2021-05-05 DIAGNOSIS — J45.909 ASTHMA: Primary | ICD-10-CM

## 2021-05-05 DIAGNOSIS — F31.32 BIPOLAR AFFECTIVE DISORDER, CURRENTLY DEPRESSED, MODERATE: Chronic | ICD-10-CM

## 2021-05-05 DIAGNOSIS — F41.1 GENERALIZED ANXIETY DISORDER: Chronic | ICD-10-CM

## 2021-05-05 DIAGNOSIS — E11.65 UNCONTROLLED TYPE 2 DIABETES MELLITUS WITH HYPERGLYCEMIA: ICD-10-CM

## 2021-05-05 DIAGNOSIS — F25.0 SCHIZOAFFECTIVE DISORDER, BIPOLAR TYPE: Primary | Chronic | ICD-10-CM

## 2021-05-05 PROCEDURE — 99999 PR PBB SHADOW E&M-EST. PATIENT-LVL III: ICD-10-PCS | Mod: PBBFAC,,,

## 2021-05-05 PROCEDURE — G0108 DIAB MANAGE TRN  PER INDIV: HCPCS | Mod: PBBFAC | Performed by: DIETITIAN, REGISTERED

## 2021-05-05 PROCEDURE — 99999 PR PBB SHADOW E&M-EST. PATIENT-LVL III: CPT | Mod: PBBFAC,,,

## 2021-05-05 PROCEDURE — 99213 OFFICE O/P EST LOW 20 MIN: CPT | Mod: PBBFAC,27

## 2021-05-05 PROCEDURE — 99999 PR PBB SHADOW E&M-EST. PATIENT-LVL V: CPT | Mod: PBBFAC,,, | Performed by: DIETITIAN, REGISTERED

## 2021-05-05 PROCEDURE — 99215 OFFICE O/P EST HI 40 MIN: CPT | Mod: PBBFAC | Performed by: DIETITIAN, REGISTERED

## 2021-05-05 PROCEDURE — 99999 PR PBB SHADOW E&M-EST. PATIENT-LVL V: ICD-10-PCS | Mod: PBBFAC,,, | Performed by: DIETITIAN, REGISTERED

## 2021-05-06 ENCOUNTER — TELEPHONE (OUTPATIENT)
Dept: GASTROENTEROLOGY | Facility: CLINIC | Age: 51
End: 2021-05-06

## 2021-05-07 ENCOUNTER — OFFICE VISIT (OUTPATIENT)
Dept: GASTROENTEROLOGY | Facility: CLINIC | Age: 51
End: 2021-05-07
Payer: MEDICAID

## 2021-05-07 VITALS
DIASTOLIC BLOOD PRESSURE: 60 MMHG | WEIGHT: 206.38 LBS | SYSTOLIC BLOOD PRESSURE: 90 MMHG | BODY MASS INDEX: 46.42 KG/M2 | HEART RATE: 88 BPM | HEIGHT: 56 IN

## 2021-05-07 DIAGNOSIS — K59.00 CONSTIPATION, UNSPECIFIED CONSTIPATION TYPE: Primary | ICD-10-CM

## 2021-05-07 PROCEDURE — 99214 OFFICE O/P EST MOD 30 MIN: CPT | Mod: S$PBB,,, | Performed by: INTERNAL MEDICINE

## 2021-05-07 PROCEDURE — 99999 PR PBB SHADOW E&M-EST. PATIENT-LVL V: CPT | Mod: PBBFAC,,, | Performed by: INTERNAL MEDICINE

## 2021-05-07 PROCEDURE — 99999 PR PBB SHADOW E&M-EST. PATIENT-LVL V: ICD-10-PCS | Mod: PBBFAC,,, | Performed by: INTERNAL MEDICINE

## 2021-05-07 PROCEDURE — 99215 OFFICE O/P EST HI 40 MIN: CPT | Mod: PBBFAC | Performed by: INTERNAL MEDICINE

## 2021-05-07 PROCEDURE — 99214 PR OFFICE/OUTPT VISIT, EST, LEVL IV, 30-39 MIN: ICD-10-PCS | Mod: S$PBB,,, | Performed by: INTERNAL MEDICINE

## 2021-05-07 RX ORDER — DOCUSATE SODIUM 100 MG/1
100 CAPSULE, LIQUID FILLED ORAL 2 TIMES DAILY
Qty: 180 CAPSULE | Refills: 0 | Status: SHIPPED | OUTPATIENT
Start: 2021-05-07 | End: 2021-07-28 | Stop reason: SDUPTHER

## 2021-05-07 RX ORDER — SODIUM, POTASSIUM,MAG SULFATES 17.5-3.13G
1 SOLUTION, RECONSTITUTED, ORAL ORAL DAILY
Qty: 1 KIT | Refills: 0 | Status: SHIPPED | OUTPATIENT
Start: 2021-05-07 | End: 2021-05-09

## 2021-05-07 RX ORDER — POLYETHYLENE GLYCOL 3350 17 G/17G
17 POWDER, FOR SOLUTION ORAL 2 TIMES DAILY
Qty: 180 EACH | Refills: 0 | Status: SHIPPED | OUTPATIENT
Start: 2021-05-07 | End: 2021-09-13 | Stop reason: SDUPTHER

## 2021-05-11 ENCOUNTER — TELEPHONE (OUTPATIENT)
Dept: ORTHOPEDICS | Facility: CLINIC | Age: 51
End: 2021-05-11

## 2021-05-17 ENCOUNTER — OFFICE VISIT (OUTPATIENT)
Dept: PSYCHIATRY | Facility: CLINIC | Age: 51
End: 2021-05-17
Payer: MEDICAID

## 2021-05-17 DIAGNOSIS — F41.1 GENERALIZED ANXIETY DISORDER: Chronic | ICD-10-CM

## 2021-05-17 DIAGNOSIS — F31.32 BIPOLAR AFFECTIVE DISORDER, CURRENTLY DEPRESSED, MODERATE: Chronic | ICD-10-CM

## 2021-05-17 DIAGNOSIS — F25.0 SCHIZOAFFECTIVE DISORDER, BIPOLAR TYPE: Chronic | ICD-10-CM

## 2021-05-17 PROCEDURE — 99417 PR PROLONGED SVC, OUTPT, W/WO DIRECT PT CONTACT,  EA ADDTL 15 MIN: ICD-10-PCS | Mod: 95,HP,HB, | Performed by: PSYCHOLOGIST

## 2021-05-17 PROCEDURE — 99205 OFFICE O/P NEW HI 60 MIN: CPT | Mod: 95,HP,HB, | Performed by: PSYCHOLOGIST

## 2021-05-17 PROCEDURE — 99205 PR OFFICE/OUTPT VISIT, NEW, LEVL V, 60-74 MIN: ICD-10-PCS | Mod: 95,HP,HB, | Performed by: PSYCHOLOGIST

## 2021-05-17 PROCEDURE — 99417 PROLNG OP E/M EACH 15 MIN: CPT | Mod: 95,HP,HB, | Performed by: PSYCHOLOGIST

## 2021-05-17 RX ORDER — ZOLPIDEM TARTRATE 10 MG/1
10 TABLET ORAL NIGHTLY
Qty: 30 TABLET | Refills: 0 | Status: SHIPPED | OUTPATIENT
Start: 2021-05-17 | End: 2021-06-09 | Stop reason: SDUPTHER

## 2021-05-17 RX ORDER — LURASIDONE HYDROCHLORIDE 120 MG/1
1 TABLET, FILM COATED ORAL NIGHTLY
Qty: 30 TABLET | Refills: 0 | Status: SHIPPED | OUTPATIENT
Start: 2021-05-17 | End: 2021-06-09 | Stop reason: SDUPTHER

## 2021-05-17 RX ORDER — ARIPIPRAZOLE 10 MG/1
10 TABLET ORAL DAILY
Qty: 30 TABLET | Refills: 0 | Status: SHIPPED | OUTPATIENT
Start: 2021-05-17 | End: 2021-06-09 | Stop reason: SDUPTHER

## 2021-05-17 RX ORDER — CLONAZEPAM 1 MG/1
1 TABLET ORAL 2 TIMES DAILY PRN
Qty: 60 TABLET | Refills: 0 | Status: SHIPPED | OUTPATIENT
Start: 2021-05-17 | End: 2021-06-09 | Stop reason: SDUPTHER

## 2021-05-17 RX ORDER — DULOXETIN HYDROCHLORIDE 60 MG/1
60 CAPSULE, DELAYED RELEASE ORAL 2 TIMES DAILY
Qty: 60 CAPSULE | Refills: 0 | Status: SHIPPED | OUTPATIENT
Start: 2021-05-17 | End: 2021-06-09 | Stop reason: SDUPTHER

## 2021-05-19 ENCOUNTER — PATIENT MESSAGE (OUTPATIENT)
Dept: DIABETES | Facility: CLINIC | Age: 51
End: 2021-05-19

## 2021-05-19 DIAGNOSIS — E11.65 UNCONTROLLED TYPE 2 DIABETES MELLITUS WITH HYPERGLYCEMIA: Primary | ICD-10-CM

## 2021-05-19 RX ORDER — FLASH GLUCOSE SENSOR
KIT MISCELLANEOUS
Qty: 2 KIT | Refills: 11 | Status: SHIPPED | OUTPATIENT
Start: 2021-05-19 | End: 2021-06-09

## 2021-05-19 RX ORDER — FLASH GLUCOSE SCANNING READER
EACH MISCELLANEOUS
Qty: 1 EACH | Refills: 1 | Status: SHIPPED | OUTPATIENT
Start: 2021-05-19 | End: 2024-02-21

## 2021-05-20 ENCOUNTER — TELEPHONE (OUTPATIENT)
Dept: DIABETES | Facility: CLINIC | Age: 51
End: 2021-05-20

## 2021-05-25 ENCOUNTER — TELEPHONE (OUTPATIENT)
Dept: DIABETES | Facility: CLINIC | Age: 51
End: 2021-05-25

## 2021-05-25 ENCOUNTER — TELEPHONE (OUTPATIENT)
Dept: PRIMARY CARE CLINIC | Facility: CLINIC | Age: 51
End: 2021-05-25

## 2021-05-26 ENCOUNTER — PATIENT MESSAGE (OUTPATIENT)
Dept: GASTROENTEROLOGY | Facility: CLINIC | Age: 51
End: 2021-05-26

## 2021-05-26 ENCOUNTER — TELEPHONE (OUTPATIENT)
Dept: DIABETES | Facility: CLINIC | Age: 51
End: 2021-05-26

## 2021-05-26 ENCOUNTER — OFFICE VISIT (OUTPATIENT)
Dept: PRIMARY CARE CLINIC | Facility: CLINIC | Age: 51
End: 2021-05-26
Payer: MEDICAID

## 2021-05-26 DIAGNOSIS — B37.31 CANDIDA VAGINITIS: Primary | ICD-10-CM

## 2021-05-26 PROCEDURE — 99213 OFFICE O/P EST LOW 20 MIN: CPT | Mod: 95,,, | Performed by: NURSE PRACTITIONER

## 2021-05-26 PROCEDURE — 99213 PR OFFICE/OUTPT VISIT, EST, LEVL III, 20-29 MIN: ICD-10-PCS | Mod: 95,,, | Performed by: NURSE PRACTITIONER

## 2021-05-26 RX ORDER — FLUCONAZOLE 150 MG/1
150 TABLET ORAL DAILY
Qty: 2 TABLET | Refills: 0 | Status: SHIPPED | OUTPATIENT
Start: 2021-05-26 | End: 2021-05-29

## 2021-05-27 ENCOUNTER — TELEPHONE (OUTPATIENT)
Dept: GASTROENTEROLOGY | Facility: CLINIC | Age: 51
End: 2021-05-27

## 2021-05-27 ENCOUNTER — OFFICE VISIT (OUTPATIENT)
Dept: DIABETES | Facility: CLINIC | Age: 51
End: 2021-05-27
Payer: MEDICAID

## 2021-05-27 ENCOUNTER — PATIENT MESSAGE (OUTPATIENT)
Dept: GASTROENTEROLOGY | Facility: CLINIC | Age: 51
End: 2021-05-27

## 2021-05-27 DIAGNOSIS — E11.65 UNCONTROLLED TYPE 2 DIABETES MELLITUS WITH HYPERGLYCEMIA: Primary | ICD-10-CM

## 2021-05-27 PROCEDURE — 99213 PR OFFICE/OUTPT VISIT, EST, LEVL III, 20-29 MIN: ICD-10-PCS | Mod: 95,,, | Performed by: NURSE PRACTITIONER

## 2021-05-27 PROCEDURE — 99213 OFFICE O/P EST LOW 20 MIN: CPT | Mod: 95,,, | Performed by: NURSE PRACTITIONER

## 2021-05-31 ENCOUNTER — PATIENT MESSAGE (OUTPATIENT)
Dept: DIABETES | Facility: CLINIC | Age: 51
End: 2021-05-31

## 2021-06-02 ENCOUNTER — OFFICE VISIT (OUTPATIENT)
Dept: PSYCHIATRY | Facility: CLINIC | Age: 51
End: 2021-06-02
Payer: COMMERCIAL

## 2021-06-02 DIAGNOSIS — R69 PSYCHIATRIC DIAGNOSIS DEFERRED: Primary | ICD-10-CM

## 2021-06-02 DIAGNOSIS — F31.32 BIPOLAR AFFECTIVE DISORDER, CURRENTLY DEPRESSED, MODERATE: Chronic | ICD-10-CM

## 2021-06-02 DIAGNOSIS — F25.0 SCHIZOAFFECTIVE DISORDER, BIPOLAR TYPE: Chronic | ICD-10-CM

## 2021-06-02 DIAGNOSIS — F41.1 GENERALIZED ANXIETY DISORDER: Chronic | ICD-10-CM

## 2021-06-02 PROCEDURE — 99499 UNLISTED E&M SERVICE: CPT | Mod: AJ,HB,95, | Performed by: SOCIAL WORKER

## 2021-06-02 PROCEDURE — 99499 NO LOS: ICD-10-PCS | Mod: AJ,HB,95, | Performed by: SOCIAL WORKER

## 2021-06-03 ENCOUNTER — PATIENT MESSAGE (OUTPATIENT)
Dept: GASTROENTEROLOGY | Facility: CLINIC | Age: 51
End: 2021-06-03

## 2021-06-08 ENCOUNTER — LAB VISIT (OUTPATIENT)
Dept: LAB | Facility: HOSPITAL | Age: 51
End: 2021-06-08
Attending: FAMILY MEDICINE
Payer: COMMERCIAL

## 2021-06-08 ENCOUNTER — OFFICE VISIT (OUTPATIENT)
Dept: PRIMARY CARE CLINIC | Facility: CLINIC | Age: 51
End: 2021-06-08
Payer: MEDICAID

## 2021-06-08 ENCOUNTER — TELEPHONE (OUTPATIENT)
Dept: OBSTETRICS AND GYNECOLOGY | Facility: CLINIC | Age: 51
End: 2021-06-08

## 2021-06-08 VITALS
OXYGEN SATURATION: 97 % | WEIGHT: 195 LBS | HEART RATE: 103 BPM | BODY MASS INDEX: 43.87 KG/M2 | DIASTOLIC BLOOD PRESSURE: 69 MMHG | SYSTOLIC BLOOD PRESSURE: 125 MMHG | TEMPERATURE: 98 F | HEIGHT: 56 IN

## 2021-06-08 DIAGNOSIS — Z11.3 SCREENING FOR STD (SEXUALLY TRANSMITTED DISEASE): Primary | ICD-10-CM

## 2021-06-08 DIAGNOSIS — E11.69 TYPE 2 DIABETES MELLITUS WITH OTHER SPECIFIED COMPLICATION, WITH LONG-TERM CURRENT USE OF INSULIN: ICD-10-CM

## 2021-06-08 DIAGNOSIS — E78.5 DYSLIPIDEMIA: Primary | ICD-10-CM

## 2021-06-08 DIAGNOSIS — E78.5 DYSLIPIDEMIA: ICD-10-CM

## 2021-06-08 DIAGNOSIS — I10 ESSENTIAL HYPERTENSION: ICD-10-CM

## 2021-06-08 DIAGNOSIS — B35.4 TINEA CORPORIS: ICD-10-CM

## 2021-06-08 DIAGNOSIS — Z01.818 PRE-OP TESTING: ICD-10-CM

## 2021-06-08 DIAGNOSIS — Z79.4 TYPE 2 DIABETES MELLITUS WITH OTHER SPECIFIED COMPLICATION, WITH LONG-TERM CURRENT USE OF INSULIN: ICD-10-CM

## 2021-06-08 LAB
BASOPHILS # BLD AUTO: 0.05 K/UL (ref 0–0.2)
BASOPHILS NFR BLD: 0.6 % (ref 0–1.9)
DIFFERENTIAL METHOD: ABNORMAL
EOSINOPHIL # BLD AUTO: 0.3 K/UL (ref 0–0.5)
EOSINOPHIL NFR BLD: 3.8 % (ref 0–8)
ERYTHROCYTE [DISTWIDTH] IN BLOOD BY AUTOMATED COUNT: 14.5 % (ref 11.5–14.5)
HCT VFR BLD AUTO: 36.6 % (ref 37–48.5)
HGB BLD-MCNC: 11 G/DL (ref 12–16)
IMM GRANULOCYTES # BLD AUTO: 0.05 K/UL (ref 0–0.04)
IMM GRANULOCYTES NFR BLD AUTO: 0.6 % (ref 0–0.5)
LYMPHOCYTES # BLD AUTO: 2.7 K/UL (ref 1–4.8)
LYMPHOCYTES NFR BLD: 31.5 % (ref 18–48)
MCH RBC QN AUTO: 27 PG (ref 27–31)
MCHC RBC AUTO-ENTMCNC: 30.1 G/DL (ref 32–36)
MCV RBC AUTO: 90 FL (ref 82–98)
MONOCYTES # BLD AUTO: 0.4 K/UL (ref 0.3–1)
MONOCYTES NFR BLD: 4.6 % (ref 4–15)
NEUTROPHILS # BLD AUTO: 5 K/UL (ref 1.8–7.7)
NEUTROPHILS NFR BLD: 58.9 % (ref 38–73)
NRBC BLD-RTO: 0 /100 WBC
PLATELET # BLD AUTO: 397 K/UL (ref 150–450)
PMV BLD AUTO: 10.6 FL (ref 9.2–12.9)
RBC # BLD AUTO: 4.07 M/UL (ref 4–5.4)
WBC # BLD AUTO: 8.51 K/UL (ref 3.9–12.7)

## 2021-06-08 PROCEDURE — 36415 COLL VENOUS BLD VENIPUNCTURE: CPT | Mod: PN | Performed by: NURSE PRACTITIONER

## 2021-06-08 PROCEDURE — 80053 COMPREHEN METABOLIC PANEL: CPT | Performed by: ORTHOPAEDIC SURGERY

## 2021-06-08 PROCEDURE — 85025 COMPLETE CBC W/AUTO DIFF WBC: CPT | Performed by: ORTHOPAEDIC SURGERY

## 2021-06-08 PROCEDURE — 99213 PR OFFICE/OUTPT VISIT, EST, LEVL III, 20-29 MIN: ICD-10-PCS | Mod: S$PBB,,, | Performed by: NURSE PRACTITIONER

## 2021-06-08 PROCEDURE — 80061 LIPID PANEL: CPT | Performed by: NURSE PRACTITIONER

## 2021-06-08 PROCEDURE — 99999 PR PBB SHADOW E&M-EST. PATIENT-LVL V: CPT | Mod: PBBFAC,,, | Performed by: NURSE PRACTITIONER

## 2021-06-08 PROCEDURE — 99215 OFFICE O/P EST HI 40 MIN: CPT | Mod: PBBFAC,PN | Performed by: NURSE PRACTITIONER

## 2021-06-08 PROCEDURE — 99213 OFFICE O/P EST LOW 20 MIN: CPT | Mod: S$PBB,,, | Performed by: NURSE PRACTITIONER

## 2021-06-08 PROCEDURE — 99999 PR PBB SHADOW E&M-EST. PATIENT-LVL V: ICD-10-PCS | Mod: PBBFAC,,, | Performed by: NURSE PRACTITIONER

## 2021-06-08 RX ORDER — NYSTATIN 100000 U/G
CREAM TOPICAL 2 TIMES DAILY
Qty: 30 G | Refills: 2 | Status: SHIPPED | OUTPATIENT
Start: 2021-06-08 | End: 2021-09-13 | Stop reason: SDUPTHER

## 2021-06-09 ENCOUNTER — OFFICE VISIT (OUTPATIENT)
Dept: DIABETES | Facility: CLINIC | Age: 51
End: 2021-06-09
Payer: MEDICAID

## 2021-06-09 ENCOUNTER — OFFICE VISIT (OUTPATIENT)
Dept: PSYCHIATRY | Facility: CLINIC | Age: 51
End: 2021-06-09
Payer: MEDICAID

## 2021-06-09 DIAGNOSIS — F41.1 GENERALIZED ANXIETY DISORDER: Chronic | ICD-10-CM

## 2021-06-09 DIAGNOSIS — F31.32 BIPOLAR AFFECTIVE DISORDER, CURRENTLY DEPRESSED, MODERATE: Primary | ICD-10-CM

## 2021-06-09 DIAGNOSIS — E11.65 UNCONTROLLED TYPE 2 DIABETES MELLITUS WITH HYPERGLYCEMIA: Primary | ICD-10-CM

## 2021-06-09 LAB
ALBUMIN SERPL BCP-MCNC: 3.9 G/DL (ref 3.5–5.2)
ALP SERPL-CCNC: 88 U/L (ref 55–135)
ALT SERPL W/O P-5'-P-CCNC: 25 U/L (ref 10–44)
ANION GAP SERPL CALC-SCNC: 13 MMOL/L (ref 8–16)
AST SERPL-CCNC: 19 U/L (ref 10–40)
BILIRUB SERPL-MCNC: 0.3 MG/DL (ref 0.1–1)
BUN SERPL-MCNC: 8 MG/DL (ref 6–20)
CALCIUM SERPL-MCNC: 10.7 MG/DL (ref 8.7–10.5)
CHLORIDE SERPL-SCNC: 101 MMOL/L (ref 95–110)
CHOLEST SERPL-MCNC: 150 MG/DL (ref 120–199)
CHOLEST/HDLC SERPL: 4.3 {RATIO} (ref 2–5)
CO2 SERPL-SCNC: 25 MMOL/L (ref 23–29)
CREAT SERPL-MCNC: 0.8 MG/DL (ref 0.5–1.4)
EST. GFR  (AFRICAN AMERICAN): >60 ML/MIN/1.73 M^2
EST. GFR  (NON AFRICAN AMERICAN): >60 ML/MIN/1.73 M^2
GLUCOSE SERPL-MCNC: 104 MG/DL (ref 70–110)
HDLC SERPL-MCNC: 35 MG/DL (ref 40–75)
HDLC SERPL: 23.3 % (ref 20–50)
LDLC SERPL CALC-MCNC: 71.4 MG/DL (ref 63–159)
NONHDLC SERPL-MCNC: 115 MG/DL
POTASSIUM SERPL-SCNC: 4.2 MMOL/L (ref 3.5–5.1)
PROT SERPL-MCNC: 7.8 G/DL (ref 6–8.4)
SODIUM SERPL-SCNC: 139 MMOL/L (ref 136–145)
TRIGL SERPL-MCNC: 218 MG/DL (ref 30–150)

## 2021-06-09 PROCEDURE — 99213 OFFICE O/P EST LOW 20 MIN: CPT | Mod: 95,,, | Performed by: NURSE PRACTITIONER

## 2021-06-09 PROCEDURE — 99214 PR OFFICE/OUTPT VISIT, EST, LEVL IV, 30-39 MIN: ICD-10-PCS | Mod: S$PBB,HB,, | Performed by: PSYCHOLOGIST

## 2021-06-09 PROCEDURE — 99213 PR OFFICE/OUTPT VISIT, EST, LEVL III, 20-29 MIN: ICD-10-PCS | Mod: 95,,, | Performed by: NURSE PRACTITIONER

## 2021-06-09 PROCEDURE — 99211 OFF/OP EST MAY X REQ PHY/QHP: CPT | Mod: PBBFAC | Performed by: PSYCHOLOGIST

## 2021-06-09 PROCEDURE — 99214 OFFICE O/P EST MOD 30 MIN: CPT | Mod: S$PBB,HB,, | Performed by: PSYCHOLOGIST

## 2021-06-09 PROCEDURE — 99999 PR PBB SHADOW E&M-EST. PATIENT-LVL I: CPT | Mod: PBBFAC,HB,, | Performed by: PSYCHOLOGIST

## 2021-06-09 PROCEDURE — 99999 PR PBB SHADOW E&M-EST. PATIENT-LVL I: ICD-10-PCS | Mod: PBBFAC,HB,, | Performed by: PSYCHOLOGIST

## 2021-06-09 RX ORDER — ZOLPIDEM TARTRATE 10 MG/1
10 TABLET ORAL NIGHTLY
Qty: 30 TABLET | Refills: 0 | Status: SHIPPED | OUTPATIENT
Start: 2021-06-09 | End: 2021-07-13 | Stop reason: SDUPTHER

## 2021-06-09 RX ORDER — LURASIDONE HYDROCHLORIDE 120 MG/1
1 TABLET, FILM COATED ORAL NIGHTLY
Qty: 30 TABLET | Refills: 1 | Status: SHIPPED | OUTPATIENT
Start: 2021-06-09 | End: 2021-07-15 | Stop reason: SDUPTHER

## 2021-06-09 RX ORDER — DULOXETIN HYDROCHLORIDE 60 MG/1
60 CAPSULE, DELAYED RELEASE ORAL 2 TIMES DAILY
Qty: 60 CAPSULE | Refills: 1 | Status: SHIPPED | OUTPATIENT
Start: 2021-06-09 | End: 2021-07-15 | Stop reason: SDUPTHER

## 2021-06-09 RX ORDER — ARIPIPRAZOLE 5 MG/1
5 TABLET ORAL DAILY
Qty: 30 TABLET | Refills: 1 | Status: SHIPPED | OUTPATIENT
Start: 2021-06-09 | End: 2021-07-13 | Stop reason: SDUPTHER

## 2021-06-09 RX ORDER — MIRTAZAPINE 30 MG/1
TABLET, FILM COATED ORAL
Qty: 28 TABLET | Refills: 0 | Status: SHIPPED | OUTPATIENT
Start: 2021-06-09 | End: 2021-07-15 | Stop reason: SDUPTHER

## 2021-06-09 RX ORDER — CLONAZEPAM 1 MG/1
1.5 TABLET ORAL DAILY
Qty: 45 TABLET | Refills: 1 | Status: SHIPPED | OUTPATIENT
Start: 2021-06-09 | End: 2021-07-15 | Stop reason: SDUPTHER

## 2021-06-09 RX ORDER — INSULIN GLARGINE 300 [IU]/ML
50 INJECTION, SOLUTION SUBCUTANEOUS DAILY
COMMUNITY
End: 2021-07-15

## 2021-06-10 DIAGNOSIS — E78.1 HYPERTRIGLYCERIDEMIA: Primary | ICD-10-CM

## 2021-06-10 RX ORDER — FENOFIBRATE 54 MG/1
54 TABLET ORAL DAILY
Qty: 30 TABLET | Refills: 3 | Status: SHIPPED | OUTPATIENT
Start: 2021-06-10 | End: 2021-06-16

## 2021-06-11 DIAGNOSIS — E11.65 UNCONTROLLED TYPE 2 DIABETES MELLITUS WITH HYPERGLYCEMIA: ICD-10-CM

## 2021-06-11 DIAGNOSIS — E55.9 VITAMIN D INSUFFICIENCY: ICD-10-CM

## 2021-06-13 RX ORDER — ERGOCALCIFEROL 1.25 MG/1
50000 CAPSULE ORAL
Qty: 4 CAPSULE | Refills: 5 | OUTPATIENT
Start: 2021-06-13

## 2021-06-15 ENCOUNTER — PATIENT MESSAGE (OUTPATIENT)
Dept: PAIN MEDICINE | Facility: CLINIC | Age: 51
End: 2021-06-15

## 2021-06-15 ENCOUNTER — TELEPHONE (OUTPATIENT)
Dept: PHARMACY | Facility: CLINIC | Age: 51
End: 2021-06-15

## 2021-06-15 ENCOUNTER — TELEPHONE (OUTPATIENT)
Dept: PAIN MEDICINE | Facility: CLINIC | Age: 51
End: 2021-06-15

## 2021-06-15 ENCOUNTER — OFFICE VISIT (OUTPATIENT)
Dept: PSYCHIATRY | Facility: CLINIC | Age: 51
End: 2021-06-15
Payer: MEDICAID

## 2021-06-15 DIAGNOSIS — F31.32 BIPOLAR AFFECTIVE DISORDER, CURRENTLY DEPRESSED, MODERATE: Primary | ICD-10-CM

## 2021-06-15 DIAGNOSIS — F41.1 GENERALIZED ANXIETY DISORDER: ICD-10-CM

## 2021-06-15 PROCEDURE — 90791 PR PSYCHIATRIC DIAGNOSTIC EVALUATION: ICD-10-PCS | Mod: 95,AJ,HB, | Performed by: SOCIAL WORKER

## 2021-06-15 PROCEDURE — 90791 PSYCH DIAGNOSTIC EVALUATION: CPT | Mod: 95,AJ,HB, | Performed by: SOCIAL WORKER

## 2021-06-16 DIAGNOSIS — E78.1 HYPERTRIGLYCERIDEMIA: Primary | ICD-10-CM

## 2021-06-16 RX ORDER — FENOFIBRATE 48 MG/1
48 TABLET, FILM COATED ORAL DAILY
Qty: 90 TABLET | Refills: 3 | Status: SHIPPED | OUTPATIENT
Start: 2021-06-16 | End: 2021-07-28

## 2021-06-29 ENCOUNTER — PATIENT OUTREACH (OUTPATIENT)
Dept: PULMONOLOGY | Facility: CLINIC | Age: 51
End: 2021-06-29

## 2021-06-29 DIAGNOSIS — E55.9 VITAMIN D INSUFFICIENCY: ICD-10-CM

## 2021-06-29 DIAGNOSIS — G43.109 MIGRAINE WITH AURA AND WITHOUT STATUS MIGRAINOSUS, NOT INTRACTABLE: Chronic | ICD-10-CM

## 2021-06-30 RX ORDER — FROVATRIPTAN SUCCINATE 2.5 MG/1
TABLET, FILM COATED ORAL
Qty: 9 TABLET | Refills: 1 | Status: SHIPPED | OUTPATIENT
Start: 2021-06-30 | End: 2021-09-13 | Stop reason: SDUPTHER

## 2021-06-30 RX ORDER — ERGOCALCIFEROL 1.25 MG/1
50000 CAPSULE ORAL
Qty: 4 CAPSULE | Refills: 5 | OUTPATIENT
Start: 2021-06-30

## 2021-07-02 ENCOUNTER — TELEPHONE (OUTPATIENT)
Dept: PAIN MEDICINE | Facility: CLINIC | Age: 51
End: 2021-07-02

## 2021-07-02 ENCOUNTER — TELEPHONE (OUTPATIENT)
Dept: PSYCHIATRY | Facility: CLINIC | Age: 51
End: 2021-07-02

## 2021-07-06 ENCOUNTER — TELEPHONE (OUTPATIENT)
Dept: PRIMARY CARE CLINIC | Facility: CLINIC | Age: 51
End: 2021-07-06

## 2021-07-13 RX ORDER — INSULIN GLARGINE 300 U/ML
50 INJECTION, SOLUTION SUBCUTANEOUS NIGHTLY
Qty: 2 PEN | Refills: 2 | Status: SHIPPED | OUTPATIENT
Start: 2021-07-13 | End: 2021-07-28

## 2021-07-13 RX ORDER — ZOLPIDEM TARTRATE 10 MG/1
10 TABLET ORAL NIGHTLY
Qty: 30 TABLET | Refills: 0 | Status: SHIPPED | OUTPATIENT
Start: 2021-07-13 | End: 2021-07-15

## 2021-07-13 RX ORDER — ARIPIPRAZOLE 5 MG/1
5 TABLET ORAL DAILY
Qty: 30 TABLET | Refills: 1 | Status: SHIPPED | OUTPATIENT
Start: 2021-07-13 | End: 2021-07-15

## 2021-07-15 ENCOUNTER — OFFICE VISIT (OUTPATIENT)
Dept: PSYCHIATRY | Facility: CLINIC | Age: 51
End: 2021-07-15
Payer: MEDICAID

## 2021-07-15 VITALS
WEIGHT: 191.56 LBS | HEART RATE: 102 BPM | DIASTOLIC BLOOD PRESSURE: 82 MMHG | BODY MASS INDEX: 42.95 KG/M2 | SYSTOLIC BLOOD PRESSURE: 112 MMHG

## 2021-07-15 DIAGNOSIS — F41.1 GENERALIZED ANXIETY DISORDER: Chronic | ICD-10-CM

## 2021-07-15 DIAGNOSIS — F31.5: Primary | ICD-10-CM

## 2021-07-15 PROCEDURE — 99214 OFFICE O/P EST MOD 30 MIN: CPT | Mod: HP,HB,S$PBB, | Performed by: PSYCHOLOGIST

## 2021-07-15 PROCEDURE — 99999 PR PBB SHADOW E&M-EST. PATIENT-LVL II: CPT | Mod: PBBFAC,HB,, | Performed by: PSYCHOLOGIST

## 2021-07-15 PROCEDURE — 99999 PR PBB SHADOW E&M-EST. PATIENT-LVL II: ICD-10-PCS | Mod: PBBFAC,HB,, | Performed by: PSYCHOLOGIST

## 2021-07-15 PROCEDURE — 99212 OFFICE O/P EST SF 10 MIN: CPT | Mod: PBBFAC | Performed by: PSYCHOLOGIST

## 2021-07-15 PROCEDURE — 99214 PR OFFICE/OUTPT VISIT, EST, LEVL IV, 30-39 MIN: ICD-10-PCS | Mod: HP,HB,S$PBB, | Performed by: PSYCHOLOGIST

## 2021-07-15 RX ORDER — CLONAZEPAM 1 MG/1
1.5 TABLET ORAL DAILY
Qty: 45 TABLET | Refills: 1 | Status: SHIPPED | OUTPATIENT
Start: 2021-07-15 | End: 2021-08-26 | Stop reason: SDUPTHER

## 2021-07-15 RX ORDER — MIRTAZAPINE 15 MG/1
15 TABLET, FILM COATED ORAL NIGHTLY
Qty: 30 TABLET | Refills: 1 | Status: SHIPPED | OUTPATIENT
Start: 2021-07-15 | End: 2021-08-26 | Stop reason: SDUPTHER

## 2021-07-15 RX ORDER — LURASIDONE HYDROCHLORIDE 120 MG/1
1 TABLET, FILM COATED ORAL NIGHTLY
Qty: 30 TABLET | Refills: 1 | Status: SHIPPED | OUTPATIENT
Start: 2021-07-15 | End: 2021-08-26 | Stop reason: SDUPTHER

## 2021-07-15 RX ORDER — DULOXETIN HYDROCHLORIDE 60 MG/1
60 CAPSULE, DELAYED RELEASE ORAL DAILY
Qty: 30 CAPSULE | Refills: 1 | Status: SHIPPED | OUTPATIENT
Start: 2021-07-15 | End: 2021-08-26 | Stop reason: SDUPTHER

## 2021-07-21 ENCOUNTER — PATIENT OUTREACH (OUTPATIENT)
Dept: PULMONOLOGY | Facility: CLINIC | Age: 51
End: 2021-07-21

## 2021-07-22 ENCOUNTER — HOSPITAL ENCOUNTER (OUTPATIENT)
Dept: RADIOLOGY | Facility: HOSPITAL | Age: 51
Discharge: HOME | End: 2021-07-22
Attending: NURSE PRACTITIONER
Payer: MEDICAID

## 2021-07-22 ENCOUNTER — HOSPITAL ENCOUNTER (OUTPATIENT)
Dept: RADIOLOGY | Facility: HOSPITAL | Age: 51
Discharge: HOME | End: 2021-07-22
Attending: PHYSICIAN ASSISTANT
Payer: MEDICAID

## 2021-07-22 DIAGNOSIS — R10.9 ABDOMINAL PAIN, UNSPECIFIED ABDOMINAL LOCATION: ICD-10-CM

## 2021-07-22 DIAGNOSIS — K59.00 CONSTIPATION, UNSPECIFIED CONSTIPATION TYPE: ICD-10-CM

## 2021-07-22 DIAGNOSIS — Z12.31 ENCOUNTER FOR SCREENING MAMMOGRAM FOR BREAST CANCER: ICD-10-CM

## 2021-07-22 DIAGNOSIS — R14.0 BLOATING: ICD-10-CM

## 2021-07-22 PROCEDURE — 77063 MAMMO DIGITAL SCREENING BILAT WITH TOMO: ICD-10-PCS | Mod: 26,,, | Performed by: RADIOLOGY

## 2021-07-22 PROCEDURE — 77063 BREAST TOMOSYNTHESIS BI: CPT | Mod: 26,,, | Performed by: RADIOLOGY

## 2021-07-22 PROCEDURE — 74018 XR ABDOMEN AP 1 VIEW: ICD-10-PCS | Mod: 26,,, | Performed by: RADIOLOGY

## 2021-07-22 PROCEDURE — 77067 SCR MAMMO BI INCL CAD: CPT | Mod: 26,,, | Performed by: RADIOLOGY

## 2021-07-22 PROCEDURE — 74018 RADEX ABDOMEN 1 VIEW: CPT | Mod: 26,,, | Performed by: RADIOLOGY

## 2021-07-22 PROCEDURE — 77067 MAMMO DIGITAL SCREENING BILAT WITH TOMO: ICD-10-PCS | Mod: 26,,, | Performed by: RADIOLOGY

## 2021-07-22 PROCEDURE — 77067 SCR MAMMO BI INCL CAD: CPT | Mod: TC

## 2021-07-22 PROCEDURE — 74018 RADEX ABDOMEN 1 VIEW: CPT | Mod: TC

## 2021-07-26 ENCOUNTER — OFFICE VISIT (OUTPATIENT)
Dept: PSYCHIATRY | Facility: CLINIC | Age: 51
End: 2021-07-26
Payer: MEDICAID

## 2021-07-26 ENCOUNTER — PATIENT MESSAGE (OUTPATIENT)
Dept: PRIMARY CARE CLINIC | Facility: CLINIC | Age: 51
End: 2021-07-26

## 2021-07-26 DIAGNOSIS — F41.1 GENERALIZED ANXIETY DISORDER: ICD-10-CM

## 2021-07-26 DIAGNOSIS — F31.5: Primary | ICD-10-CM

## 2021-07-26 PROCEDURE — 90834 PSYTX W PT 45 MINUTES: CPT | Mod: 95,AJ,HB, | Performed by: SOCIAL WORKER

## 2021-07-26 PROCEDURE — 90834 PR PSYCHOTHERAPY W/PATIENT, 45 MIN: ICD-10-PCS | Mod: 95,AJ,HB, | Performed by: SOCIAL WORKER

## 2021-07-27 PROBLEM — G47.39 OTHER SLEEP APNEA: Status: RESOLVED | Noted: 2021-01-27 | Resolved: 2021-07-27

## 2021-07-28 ENCOUNTER — OFFICE VISIT (OUTPATIENT)
Dept: DIABETES | Facility: CLINIC | Age: 51
End: 2021-07-28
Payer: COMMERCIAL

## 2021-07-28 DIAGNOSIS — E78.1 HYPERTRIGLYCERIDEMIA: Primary | ICD-10-CM

## 2021-07-28 DIAGNOSIS — K59.00 CONSTIPATION, UNSPECIFIED CONSTIPATION TYPE: ICD-10-CM

## 2021-07-28 DIAGNOSIS — E11.65 UNCONTROLLED TYPE 2 DIABETES MELLITUS WITH HYPERGLYCEMIA: Primary | ICD-10-CM

## 2021-07-28 PROCEDURE — 99213 OFFICE O/P EST LOW 20 MIN: CPT | Mod: 95,,, | Performed by: NURSE PRACTITIONER

## 2021-07-28 PROCEDURE — 99213 PR OFFICE/OUTPT VISIT, EST, LEVL III, 20-29 MIN: ICD-10-PCS | Mod: 95,,, | Performed by: NURSE PRACTITIONER

## 2021-07-28 RX ORDER — INSULIN GLARGINE 300 U/ML
70 INJECTION, SOLUTION SUBCUTANEOUS NIGHTLY
Qty: 3 PEN | Refills: 3
Start: 2021-07-28 | End: 2021-09-13 | Stop reason: SDUPTHER

## 2021-07-28 RX ORDER — DOCUSATE SODIUM 100 MG/1
100 CAPSULE, LIQUID FILLED ORAL 2 TIMES DAILY
Qty: 180 CAPSULE | Refills: 0 | Status: SHIPPED | OUTPATIENT
Start: 2021-07-28 | End: 2021-09-13 | Stop reason: SDUPTHER

## 2021-07-28 RX ORDER — FENOFIBRATE 54 MG/1
54 TABLET ORAL DAILY
Qty: 30 TABLET | Refills: 3 | Status: SHIPPED | OUTPATIENT
Start: 2021-07-28 | End: 2021-12-23 | Stop reason: SDUPTHER

## 2021-08-03 ENCOUNTER — OFFICE VISIT (OUTPATIENT)
Dept: PULMONOLOGY | Facility: CLINIC | Age: 51
End: 2021-08-03
Payer: MEDICAID

## 2021-08-03 VITALS
DIASTOLIC BLOOD PRESSURE: 82 MMHG | OXYGEN SATURATION: 98 % | RESPIRATION RATE: 20 BRPM | WEIGHT: 189.06 LBS | SYSTOLIC BLOOD PRESSURE: 120 MMHG | HEIGHT: 56 IN | HEART RATE: 102 BPM | BODY MASS INDEX: 42.53 KG/M2

## 2021-08-03 DIAGNOSIS — R94.2 DIFFUSION CAPACITY OF LUNG (DL), DECREASED: Chronic | ICD-10-CM

## 2021-08-03 DIAGNOSIS — J45.40 MODERATE PERSISTENT ASTHMA WITHOUT COMPLICATION: Primary | ICD-10-CM

## 2021-08-03 DIAGNOSIS — G47.36 NOCTURNAL HYPOXEMIA DUE TO OBESITY: Chronic | ICD-10-CM

## 2021-08-03 DIAGNOSIS — I15.2 HYPERTENSION COMPLICATING DIABETES: Chronic | ICD-10-CM

## 2021-08-03 DIAGNOSIS — Z79.4 TYPE 2 DIABETES MELLITUS WITH HYPERGLYCEMIA, WITH LONG-TERM CURRENT USE OF INSULIN: Chronic | ICD-10-CM

## 2021-08-03 DIAGNOSIS — E66.9 NOCTURNAL HYPOXEMIA DUE TO OBESITY: Chronic | ICD-10-CM

## 2021-08-03 DIAGNOSIS — R94.2 RESTRICTIVE VENTILATORY DEFECT: ICD-10-CM

## 2021-08-03 DIAGNOSIS — G47.20 CIRCADIAN RHYTHM DISORDER: ICD-10-CM

## 2021-08-03 DIAGNOSIS — E11.65 TYPE 2 DIABETES MELLITUS WITH HYPERGLYCEMIA, WITH LONG-TERM CURRENT USE OF INSULIN: Chronic | ICD-10-CM

## 2021-08-03 DIAGNOSIS — E11.59 HYPERTENSION COMPLICATING DIABETES: Chronic | ICD-10-CM

## 2021-08-03 DIAGNOSIS — E66.2 OBESITY HYPOVENTILATION SYNDROME: ICD-10-CM

## 2021-08-03 DIAGNOSIS — E11.69 DYSLIPIDEMIA ASSOCIATED WITH TYPE 2 DIABETES MELLITUS: Chronic | ICD-10-CM

## 2021-08-03 DIAGNOSIS — E78.5 DYSLIPIDEMIA ASSOCIATED WITH TYPE 2 DIABETES MELLITUS: Chronic | ICD-10-CM

## 2021-08-03 PROBLEM — G47.8 NOCTURNAL SLEEP-RELATED EATING DISORDER: Status: RESOLVED | Noted: 2020-10-27 | Resolved: 2021-08-03

## 2021-08-03 PROCEDURE — 99214 PR OFFICE/OUTPT VISIT, EST, LEVL IV, 30-39 MIN: ICD-10-PCS | Mod: S$GLB,,, | Performed by: INTERNAL MEDICINE

## 2021-08-03 PROCEDURE — 99214 OFFICE O/P EST MOD 30 MIN: CPT | Mod: S$GLB,,, | Performed by: INTERNAL MEDICINE

## 2021-08-03 PROCEDURE — 99215 OFFICE O/P EST HI 40 MIN: CPT | Mod: PBBFAC | Performed by: INTERNAL MEDICINE

## 2021-08-03 PROCEDURE — 99999 PR PBB SHADOW E&M-EST. PATIENT-LVL V: ICD-10-PCS | Mod: PBBFAC,,, | Performed by: INTERNAL MEDICINE

## 2021-08-03 PROCEDURE — 99999 PR PBB SHADOW E&M-EST. PATIENT-LVL V: CPT | Mod: PBBFAC,,, | Performed by: INTERNAL MEDICINE

## 2021-08-03 RX ORDER — BUDESONIDE AND FORMOTEROL FUMARATE DIHYDRATE 160; 4.5 UG/1; UG/1
2 AEROSOL RESPIRATORY (INHALATION) EVERY 12 HOURS
Qty: 10.2 G | Refills: 11 | Status: SHIPPED | OUTPATIENT
Start: 2021-08-03 | End: 2022-08-22 | Stop reason: SDUPTHER

## 2021-08-03 RX ORDER — ALBUTEROL SULFATE 90 UG/1
2 AEROSOL, METERED RESPIRATORY (INHALATION) EVERY 6 HOURS PRN
Qty: 8.5 G | Refills: 3 | Status: SHIPPED | OUTPATIENT
Start: 2021-08-03 | End: 2021-12-09

## 2021-08-05 ENCOUNTER — OFFICE VISIT (OUTPATIENT)
Dept: PRIMARY CARE CLINIC | Facility: CLINIC | Age: 51
End: 2021-08-05
Payer: COMMERCIAL

## 2021-08-05 VITALS
WEIGHT: 188.19 LBS | BODY MASS INDEX: 42.33 KG/M2 | HEART RATE: 97 BPM | DIASTOLIC BLOOD PRESSURE: 76 MMHG | TEMPERATURE: 98 F | OXYGEN SATURATION: 98 % | SYSTOLIC BLOOD PRESSURE: 117 MMHG | HEIGHT: 56 IN

## 2021-08-05 DIAGNOSIS — L02.415 CUTANEOUS ABSCESS OF RIGHT HIP: ICD-10-CM

## 2021-08-05 DIAGNOSIS — E78.1 HYPERTRIGLYCERIDEMIA: Primary | ICD-10-CM

## 2021-08-05 PROCEDURE — 99999 PR PBB SHADOW E&M-EST. PATIENT-LVL V: CPT | Mod: PBBFAC,,, | Performed by: NURSE PRACTITIONER

## 2021-08-05 PROCEDURE — 99213 PR OFFICE/OUTPT VISIT, EST, LEVL III, 20-29 MIN: ICD-10-PCS | Mod: S$GLB,,, | Performed by: NURSE PRACTITIONER

## 2021-08-05 PROCEDURE — 99215 OFFICE O/P EST HI 40 MIN: CPT | Mod: PBBFAC,PN | Performed by: NURSE PRACTITIONER

## 2021-08-05 PROCEDURE — 99213 OFFICE O/P EST LOW 20 MIN: CPT | Mod: S$GLB,,, | Performed by: NURSE PRACTITIONER

## 2021-08-05 PROCEDURE — 99999 PR PBB SHADOW E&M-EST. PATIENT-LVL V: ICD-10-PCS | Mod: PBBFAC,,, | Performed by: NURSE PRACTITIONER

## 2021-08-05 RX ORDER — MUPIROCIN 20 MG/G
OINTMENT TOPICAL 2 TIMES DAILY
Qty: 30 G | Refills: 0 | Status: SHIPPED | OUTPATIENT
Start: 2021-08-05 | End: 2021-08-15

## 2021-08-05 RX ORDER — SULFAMETHOXAZOLE AND TRIMETHOPRIM 800; 160 MG/1; MG/1
1 TABLET ORAL 2 TIMES DAILY
Qty: 20 TABLET | Refills: 0 | Status: SHIPPED | OUTPATIENT
Start: 2021-08-05 | End: 2021-08-15

## 2021-08-09 ENCOUNTER — OFFICE VISIT (OUTPATIENT)
Dept: PSYCHIATRY | Facility: CLINIC | Age: 51
End: 2021-08-09
Payer: MEDICAID

## 2021-08-09 DIAGNOSIS — F31.5: Primary | ICD-10-CM

## 2021-08-09 DIAGNOSIS — F41.1 GENERALIZED ANXIETY DISORDER: ICD-10-CM

## 2021-08-09 PROCEDURE — 90834 PR PSYCHOTHERAPY W/PATIENT, 45 MIN: ICD-10-PCS | Mod: 95,AJ,HB, | Performed by: SOCIAL WORKER

## 2021-08-09 PROCEDURE — 90834 PSYTX W PT 45 MINUTES: CPT | Mod: 95,AJ,HB, | Performed by: SOCIAL WORKER

## 2021-08-13 DIAGNOSIS — E11.65 TYPE 2 DIABETES MELLITUS WITH HYPERGLYCEMIA, WITH LONG-TERM CURRENT USE OF INSULIN: Chronic | ICD-10-CM

## 2021-08-13 DIAGNOSIS — E11.65 UNCONTROLLED TYPE 2 DIABETES MELLITUS WITH HYPERGLYCEMIA: ICD-10-CM

## 2021-08-13 DIAGNOSIS — Z79.4 TYPE 2 DIABETES MELLITUS WITH HYPERGLYCEMIA, WITH LONG-TERM CURRENT USE OF INSULIN: Chronic | ICD-10-CM

## 2021-08-13 RX ORDER — EXENATIDE 2 MG/.85ML
2 INJECTION, SUSPENSION, EXTENDED RELEASE SUBCUTANEOUS
Qty: 4 PEN | Refills: 3 | Status: SHIPPED | OUTPATIENT
Start: 2021-08-13 | End: 2021-10-21 | Stop reason: SDUPTHER

## 2021-08-13 RX ORDER — MUPIROCIN 20 MG/G
OINTMENT TOPICAL 2 TIMES DAILY
Qty: 30 G | Refills: 0 | OUTPATIENT
Start: 2021-08-13 | End: 2021-08-23

## 2021-08-13 RX ORDER — INSULIN ASPART 100 [IU]/ML
INJECTION, SOLUTION INTRAVENOUS; SUBCUTANEOUS
Qty: 30 ML | Refills: 3 | Status: SHIPPED | OUTPATIENT
Start: 2021-08-13 | End: 2021-09-15 | Stop reason: SDUPTHER

## 2021-08-13 RX ORDER — INSULIN GLARGINE 300 U/ML
70 INJECTION, SOLUTION SUBCUTANEOUS NIGHTLY
Qty: 6 PEN | Refills: 1 | Status: SHIPPED | OUTPATIENT
Start: 2021-08-13 | End: 2021-09-15 | Stop reason: SDUPTHER

## 2021-08-16 ENCOUNTER — TELEPHONE (OUTPATIENT)
Dept: PHARMACY | Facility: CLINIC | Age: 51
End: 2021-08-16

## 2021-08-26 ENCOUNTER — DOCUMENTATION ONLY (OUTPATIENT)
Dept: PSYCHIATRY | Facility: CLINIC | Age: 51
End: 2021-08-26

## 2021-08-26 ENCOUNTER — PATIENT MESSAGE (OUTPATIENT)
Dept: PSYCHIATRY | Facility: CLINIC | Age: 51
End: 2021-08-26

## 2021-08-26 ENCOUNTER — OFFICE VISIT (OUTPATIENT)
Dept: PSYCHIATRY | Facility: CLINIC | Age: 51
End: 2021-08-26
Payer: COMMERCIAL

## 2021-08-26 DIAGNOSIS — F31.30 BIPOLAR DISORDER, MOST RECENT EPISODE DEPRESSED: Primary | ICD-10-CM

## 2021-08-26 DIAGNOSIS — F41.1 GENERALIZED ANXIETY DISORDER: Chronic | ICD-10-CM

## 2021-08-26 PROCEDURE — 99214 OFFICE O/P EST MOD 30 MIN: CPT | Mod: 95,,, | Performed by: PSYCHOLOGIST

## 2021-08-26 PROCEDURE — 99214 PR OFFICE/OUTPT VISIT, EST, LEVL IV, 30-39 MIN: ICD-10-PCS | Mod: 95,,, | Performed by: PSYCHOLOGIST

## 2021-08-26 RX ORDER — MIRTAZAPINE 15 MG/1
7.5 TABLET, FILM COATED ORAL NIGHTLY
Qty: 15 TABLET | Refills: 1 | Status: SHIPPED | OUTPATIENT
Start: 2021-08-26 | End: 2021-09-13 | Stop reason: SDUPTHER

## 2021-08-26 RX ORDER — DULOXETIN HYDROCHLORIDE 60 MG/1
60 CAPSULE, DELAYED RELEASE ORAL DAILY
Qty: 30 CAPSULE | Refills: 1 | Status: SHIPPED | OUTPATIENT
Start: 2021-08-26 | End: 2021-09-13 | Stop reason: SDUPTHER

## 2021-08-26 RX ORDER — LURASIDONE HYDROCHLORIDE 120 MG/1
1 TABLET, FILM COATED ORAL NIGHTLY
Qty: 30 TABLET | Refills: 1 | Status: SHIPPED | OUTPATIENT
Start: 2021-08-26 | End: 2021-09-13 | Stop reason: SDUPTHER

## 2021-08-26 RX ORDER — CLONAZEPAM 1 MG/1
1.5 TABLET ORAL DAILY
Qty: 45 TABLET | Refills: 1 | Status: SHIPPED | OUTPATIENT
Start: 2021-08-26 | End: 2021-10-12 | Stop reason: SDUPTHER

## 2021-09-03 ENCOUNTER — OFFICE VISIT (OUTPATIENT)
Dept: PRIMARY CARE CLINIC | Facility: CLINIC | Age: 51
End: 2021-09-03
Payer: COMMERCIAL

## 2021-09-03 ENCOUNTER — PATIENT MESSAGE (OUTPATIENT)
Dept: PRIMARY CARE CLINIC | Facility: CLINIC | Age: 51
End: 2021-09-03

## 2021-09-03 ENCOUNTER — PATIENT MESSAGE (OUTPATIENT)
Dept: DIABETES | Facility: CLINIC | Age: 51
End: 2021-09-03

## 2021-09-03 DIAGNOSIS — N94.89 LABIAL PAIN: ICD-10-CM

## 2021-09-03 DIAGNOSIS — N76.4 ABSCESS OF LEFT GENITAL LABIA: Primary | ICD-10-CM

## 2021-09-03 PROCEDURE — 99213 PR OFFICE/OUTPT VISIT, EST, LEVL III, 20-29 MIN: ICD-10-PCS | Mod: 95,,, | Performed by: NURSE PRACTITIONER

## 2021-09-03 PROCEDURE — 99213 OFFICE O/P EST LOW 20 MIN: CPT | Mod: 95,,, | Performed by: NURSE PRACTITIONER

## 2021-09-03 RX ORDER — MUPIROCIN 20 MG/G
OINTMENT TOPICAL 2 TIMES DAILY
Qty: 22 G | Refills: 1 | Status: SHIPPED | OUTPATIENT
Start: 2021-09-03 | End: 2021-10-11 | Stop reason: SDUPTHER

## 2021-09-03 RX ORDER — SULFAMETHOXAZOLE AND TRIMETHOPRIM 800; 160 MG/1; MG/1
1 TABLET ORAL 2 TIMES DAILY
Qty: 10 TABLET | Refills: 0 | Status: SHIPPED | OUTPATIENT
Start: 2021-09-03 | End: 2021-09-08

## 2021-09-13 ENCOUNTER — PATIENT MESSAGE (OUTPATIENT)
Dept: PSYCHIATRY | Facility: CLINIC | Age: 51
End: 2021-09-13

## 2021-09-13 DIAGNOSIS — K59.00 CONSTIPATION, UNSPECIFIED CONSTIPATION TYPE: ICD-10-CM

## 2021-09-13 DIAGNOSIS — G43.109 MIGRAINE WITH AURA AND WITHOUT STATUS MIGRAINOSUS, NOT INTRACTABLE: Chronic | ICD-10-CM

## 2021-09-13 RX ORDER — CLONAZEPAM 1 MG/1
1.5 TABLET ORAL DAILY
Qty: 45 TABLET | Refills: 1 | Status: CANCELLED | OUTPATIENT
Start: 2021-09-13 | End: 2021-11-12

## 2021-09-13 RX ORDER — LURASIDONE HYDROCHLORIDE 120 MG/1
1 TABLET, FILM COATED ORAL NIGHTLY
Qty: 30 TABLET | Refills: 1 | Status: SHIPPED | OUTPATIENT
Start: 2021-09-13 | End: 2021-10-12

## 2021-09-13 RX ORDER — DULOXETIN HYDROCHLORIDE 60 MG/1
60 CAPSULE, DELAYED RELEASE ORAL DAILY
Qty: 30 CAPSULE | Refills: 1 | Status: SHIPPED | OUTPATIENT
Start: 2021-09-13 | End: 2021-10-12 | Stop reason: SDUPTHER

## 2021-09-13 RX ORDER — MIRTAZAPINE 15 MG/1
7.5 TABLET, FILM COATED ORAL NIGHTLY
Qty: 15 TABLET | Refills: 1 | Status: SHIPPED | OUTPATIENT
Start: 2021-09-13 | End: 2021-10-12

## 2021-09-13 RX ORDER — MUPIROCIN 20 MG/G
OINTMENT TOPICAL 2 TIMES DAILY
Qty: 22 G | Refills: 1 | Status: CANCELLED | OUTPATIENT
Start: 2021-09-13

## 2021-09-13 RX ORDER — SULFAMETHOXAZOLE AND TRIMETHOPRIM 800; 160 MG/1; MG/1
1 TABLET ORAL 2 TIMES DAILY
Qty: 10 TABLET | Refills: 0 | Status: CANCELLED | OUTPATIENT
Start: 2021-09-13 | End: 2021-09-18

## 2021-09-14 ENCOUNTER — PATIENT OUTREACH (OUTPATIENT)
Dept: ADMINISTRATIVE | Facility: OTHER | Age: 51
End: 2021-09-14

## 2021-09-15 ENCOUNTER — OFFICE VISIT (OUTPATIENT)
Dept: CARDIOLOGY | Facility: CLINIC | Age: 51
End: 2021-09-15
Payer: COMMERCIAL

## 2021-09-15 VITALS
BODY MASS INDEX: 42.75 KG/M2 | OXYGEN SATURATION: 99 % | WEIGHT: 190.06 LBS | DIASTOLIC BLOOD PRESSURE: 70 MMHG | SYSTOLIC BLOOD PRESSURE: 114 MMHG | HEIGHT: 56 IN | HEART RATE: 97 BPM

## 2021-09-15 DIAGNOSIS — E66.2 OBESITY HYPOVENTILATION SYNDROME: ICD-10-CM

## 2021-09-15 DIAGNOSIS — E11.65 TYPE 2 DIABETES MELLITUS WITH HYPERGLYCEMIA, WITH LONG-TERM CURRENT USE OF INSULIN: Chronic | ICD-10-CM

## 2021-09-15 DIAGNOSIS — I15.2 HYPERTENSION COMPLICATING DIABETES: Primary | Chronic | ICD-10-CM

## 2021-09-15 DIAGNOSIS — E78.1 HYPERTRIGLYCERIDEMIA: Chronic | ICD-10-CM

## 2021-09-15 DIAGNOSIS — E66.9 NOCTURNAL HYPOXEMIA DUE TO OBESITY: Chronic | ICD-10-CM

## 2021-09-15 DIAGNOSIS — F41.1 GENERALIZED ANXIETY DISORDER: Chronic | ICD-10-CM

## 2021-09-15 DIAGNOSIS — Z79.4 TYPE 2 DIABETES MELLITUS WITH HYPERGLYCEMIA, WITH LONG-TERM CURRENT USE OF INSULIN: Chronic | ICD-10-CM

## 2021-09-15 DIAGNOSIS — R94.31 ABNORMAL ECG: ICD-10-CM

## 2021-09-15 DIAGNOSIS — E11.59 HYPERTENSION COMPLICATING DIABETES: Primary | Chronic | ICD-10-CM

## 2021-09-15 DIAGNOSIS — F31.5: ICD-10-CM

## 2021-09-15 DIAGNOSIS — F31.9 BIPOLAR 1 DISORDER: Chronic | ICD-10-CM

## 2021-09-15 DIAGNOSIS — E11.69 DYSLIPIDEMIA ASSOCIATED WITH TYPE 2 DIABETES MELLITUS: Chronic | ICD-10-CM

## 2021-09-15 DIAGNOSIS — R94.2 DIFFUSION CAPACITY OF LUNG (DL), DECREASED: Chronic | ICD-10-CM

## 2021-09-15 DIAGNOSIS — K21.9 GASTROESOPHAGEAL REFLUX DISEASE, UNSPECIFIED WHETHER ESOPHAGITIS PRESENT: ICD-10-CM

## 2021-09-15 DIAGNOSIS — E55.9 VITAMIN D DEFICIENCY: Chronic | ICD-10-CM

## 2021-09-15 DIAGNOSIS — G43.109 MIGRAINE WITH AURA AND WITHOUT STATUS MIGRAINOSUS, NOT INTRACTABLE: Chronic | ICD-10-CM

## 2021-09-15 DIAGNOSIS — E11.42 DIABETIC POLYNEUROPATHY ASSOCIATED WITH TYPE 2 DIABETES MELLITUS: Chronic | ICD-10-CM

## 2021-09-15 DIAGNOSIS — G47.36 NOCTURNAL HYPOXEMIA DUE TO OBESITY: Chronic | ICD-10-CM

## 2021-09-15 DIAGNOSIS — E78.5 DYSLIPIDEMIA ASSOCIATED WITH TYPE 2 DIABETES MELLITUS: Chronic | ICD-10-CM

## 2021-09-15 PROCEDURE — 99999 PR PBB SHADOW E&M-EST. PATIENT-LVL V: ICD-10-PCS | Mod: PBBFAC,,, | Performed by: INTERNAL MEDICINE

## 2021-09-15 PROCEDURE — 1159F PR MEDICATION LIST DOCUMENTED IN MEDICAL RECORD: ICD-10-PCS | Mod: CPTII,S$GLB,, | Performed by: INTERNAL MEDICINE

## 2021-09-15 PROCEDURE — 4010F PR ACE/ARB THEARPY RXD/TAKEN: ICD-10-PCS | Mod: CPTII,S$GLB,, | Performed by: INTERNAL MEDICINE

## 2021-09-15 PROCEDURE — 3008F PR BODY MASS INDEX (BMI) DOCUMENTED: ICD-10-PCS | Mod: CPTII,S$GLB,, | Performed by: INTERNAL MEDICINE

## 2021-09-15 PROCEDURE — 3044F HG A1C LEVEL LT 7.0%: CPT | Mod: CPTII,S$GLB,, | Performed by: INTERNAL MEDICINE

## 2021-09-15 PROCEDURE — 3074F SYST BP LT 130 MM HG: CPT | Mod: CPTII,S$GLB,, | Performed by: INTERNAL MEDICINE

## 2021-09-15 PROCEDURE — 1159F MED LIST DOCD IN RCRD: CPT | Mod: CPTII,S$GLB,, | Performed by: INTERNAL MEDICINE

## 2021-09-15 PROCEDURE — 3074F PR MOST RECENT SYSTOLIC BLOOD PRESSURE < 130 MM HG: ICD-10-PCS | Mod: CPTII,S$GLB,, | Performed by: INTERNAL MEDICINE

## 2021-09-15 PROCEDURE — 1160F PR REVIEW ALL MEDS BY PRESCRIBER/CLIN PHARMACIST DOCUMENTED: ICD-10-PCS | Mod: CPTII,S$GLB,, | Performed by: INTERNAL MEDICINE

## 2021-09-15 PROCEDURE — 3072F PR LOW RISK FOR RETINOPATHY: ICD-10-PCS | Mod: CPTII,S$GLB,, | Performed by: INTERNAL MEDICINE

## 2021-09-15 PROCEDURE — 3008F BODY MASS INDEX DOCD: CPT | Mod: CPTII,S$GLB,, | Performed by: INTERNAL MEDICINE

## 2021-09-15 PROCEDURE — 99214 OFFICE O/P EST MOD 30 MIN: CPT | Mod: S$GLB,,, | Performed by: INTERNAL MEDICINE

## 2021-09-15 PROCEDURE — 4010F ACE/ARB THERAPY RXD/TAKEN: CPT | Mod: CPTII,S$GLB,, | Performed by: INTERNAL MEDICINE

## 2021-09-15 PROCEDURE — 3072F LOW RISK FOR RETINOPATHY: CPT | Mod: CPTII,S$GLB,, | Performed by: INTERNAL MEDICINE

## 2021-09-15 PROCEDURE — 99999 PR PBB SHADOW E&M-EST. PATIENT-LVL V: CPT | Mod: PBBFAC,,, | Performed by: INTERNAL MEDICINE

## 2021-09-15 PROCEDURE — 3044F PR MOST RECENT HEMOGLOBIN A1C LEVEL <7.0%: ICD-10-PCS | Mod: CPTII,S$GLB,, | Performed by: INTERNAL MEDICINE

## 2021-09-15 PROCEDURE — 1160F RVW MEDS BY RX/DR IN RCRD: CPT | Mod: CPTII,S$GLB,, | Performed by: INTERNAL MEDICINE

## 2021-09-15 PROCEDURE — 3078F DIAST BP <80 MM HG: CPT | Mod: CPTII,S$GLB,, | Performed by: INTERNAL MEDICINE

## 2021-09-15 PROCEDURE — 99214 PR OFFICE/OUTPT VISIT, EST, LEVL IV, 30-39 MIN: ICD-10-PCS | Mod: S$GLB,,, | Performed by: INTERNAL MEDICINE

## 2021-09-15 PROCEDURE — 3078F PR MOST RECENT DIASTOLIC BLOOD PRESSURE < 80 MM HG: ICD-10-PCS | Mod: CPTII,S$GLB,, | Performed by: INTERNAL MEDICINE

## 2021-09-15 RX ORDER — INSULIN ASPART 100 [IU]/ML
INJECTION, SOLUTION INTRAVENOUS; SUBCUTANEOUS
Qty: 30 ML | Refills: 3 | Status: SHIPPED | OUTPATIENT
Start: 2021-09-15 | End: 2021-10-21

## 2021-09-15 RX ORDER — INSULIN GLARGINE 300 U/ML
70 INJECTION, SOLUTION SUBCUTANEOUS NIGHTLY
Qty: 6 PEN | Refills: 1 | Status: SHIPPED | OUTPATIENT
Start: 2021-09-15 | End: 2021-10-21

## 2021-09-15 RX ORDER — NYSTATIN 100000 U/G
CREAM TOPICAL 2 TIMES DAILY
Qty: 30 G | Refills: 2 | Status: SHIPPED | OUTPATIENT
Start: 2021-09-15 | End: 2022-02-23

## 2021-09-15 RX ORDER — FROVATRIPTAN SUCCINATE 2.5 MG/1
TABLET, FILM COATED ORAL
Qty: 9 TABLET | Refills: 1 | Status: SHIPPED | OUTPATIENT
Start: 2021-09-15 | End: 2021-11-16 | Stop reason: SDUPTHER

## 2021-09-16 RX ORDER — POLYETHYLENE GLYCOL 3350 17 G/17G
17 POWDER, FOR SOLUTION ORAL 2 TIMES DAILY
Qty: 180 EACH | Refills: 0 | Status: SHIPPED | OUTPATIENT
Start: 2021-09-16 | End: 2023-07-19 | Stop reason: SDUPTHER

## 2021-09-16 RX ORDER — DOCUSATE SODIUM 100 MG/1
100 CAPSULE, LIQUID FILLED ORAL 2 TIMES DAILY
Qty: 180 CAPSULE | Refills: 0 | Status: SHIPPED | OUTPATIENT
Start: 2021-09-16 | End: 2021-10-11 | Stop reason: SDUPTHER

## 2021-09-17 ENCOUNTER — PATIENT MESSAGE (OUTPATIENT)
Dept: PAIN MEDICINE | Facility: CLINIC | Age: 51
End: 2021-09-17

## 2021-09-17 ENCOUNTER — OFFICE VISIT (OUTPATIENT)
Dept: PAIN MEDICINE | Facility: CLINIC | Age: 51
End: 2021-09-17
Payer: COMMERCIAL

## 2021-09-17 VITALS — HEIGHT: 56 IN | BODY MASS INDEX: 42.74 KG/M2 | WEIGHT: 190 LBS | RESPIRATION RATE: 17 BRPM

## 2021-09-17 DIAGNOSIS — M54.6 ACUTE RIGHT-SIDED THORACIC BACK PAIN: ICD-10-CM

## 2021-09-17 DIAGNOSIS — M79.18 MYOFASCIAL MUSCLE PAIN: ICD-10-CM

## 2021-09-17 DIAGNOSIS — M79.7 FIBROMYALGIA: Primary | ICD-10-CM

## 2021-09-17 DIAGNOSIS — M47.816 LUMBAR SPONDYLOSIS: ICD-10-CM

## 2021-09-17 DIAGNOSIS — G89.29 CHRONIC PAIN OF BOTH SHOULDERS: ICD-10-CM

## 2021-09-17 DIAGNOSIS — M25.511 CHRONIC PAIN OF BOTH SHOULDERS: ICD-10-CM

## 2021-09-17 DIAGNOSIS — M25.512 CHRONIC PAIN OF BOTH SHOULDERS: ICD-10-CM

## 2021-09-17 PROCEDURE — 99214 OFFICE O/P EST MOD 30 MIN: CPT | Mod: 95,,, | Performed by: PHYSICIAN ASSISTANT

## 2021-09-17 PROCEDURE — 1159F PR MEDICATION LIST DOCUMENTED IN MEDICAL RECORD: ICD-10-PCS | Mod: CPTII,95,, | Performed by: PHYSICIAN ASSISTANT

## 2021-09-17 PROCEDURE — 3008F BODY MASS INDEX DOCD: CPT | Mod: CPTII,95,, | Performed by: PHYSICIAN ASSISTANT

## 2021-09-17 PROCEDURE — 4010F ACE/ARB THERAPY RXD/TAKEN: CPT | Mod: CPTII,95,, | Performed by: PHYSICIAN ASSISTANT

## 2021-09-17 PROCEDURE — 3008F PR BODY MASS INDEX (BMI) DOCUMENTED: ICD-10-PCS | Mod: CPTII,95,, | Performed by: PHYSICIAN ASSISTANT

## 2021-09-17 PROCEDURE — 3044F PR MOST RECENT HEMOGLOBIN A1C LEVEL <7.0%: ICD-10-PCS | Mod: CPTII,95,, | Performed by: PHYSICIAN ASSISTANT

## 2021-09-17 PROCEDURE — 4010F PR ACE/ARB THEARPY RXD/TAKEN: ICD-10-PCS | Mod: CPTII,95,, | Performed by: PHYSICIAN ASSISTANT

## 2021-09-17 PROCEDURE — 1159F MED LIST DOCD IN RCRD: CPT | Mod: CPTII,95,, | Performed by: PHYSICIAN ASSISTANT

## 2021-09-17 PROCEDURE — 1160F PR REVIEW ALL MEDS BY PRESCRIBER/CLIN PHARMACIST DOCUMENTED: ICD-10-PCS | Mod: CPTII,95,, | Performed by: PHYSICIAN ASSISTANT

## 2021-09-17 PROCEDURE — 3072F LOW RISK FOR RETINOPATHY: CPT | Mod: CPTII,95,, | Performed by: PHYSICIAN ASSISTANT

## 2021-09-17 PROCEDURE — 3044F HG A1C LEVEL LT 7.0%: CPT | Mod: CPTII,95,, | Performed by: PHYSICIAN ASSISTANT

## 2021-09-17 PROCEDURE — 3072F PR LOW RISK FOR RETINOPATHY: ICD-10-PCS | Mod: CPTII,95,, | Performed by: PHYSICIAN ASSISTANT

## 2021-09-17 PROCEDURE — 1160F RVW MEDS BY RX/DR IN RCRD: CPT | Mod: CPTII,95,, | Performed by: PHYSICIAN ASSISTANT

## 2021-09-17 PROCEDURE — 99214 PR OFFICE/OUTPT VISIT, EST, LEVL IV, 30-39 MIN: ICD-10-PCS | Mod: 95,,, | Performed by: PHYSICIAN ASSISTANT

## 2021-09-17 RX ORDER — DICLOFENAC SODIUM 10 MG/G
2 GEL TOPICAL 4 TIMES DAILY PRN
Qty: 5 TUBE | Refills: 0 | Status: SHIPPED | OUTPATIENT
Start: 2021-09-17 | End: 2021-11-16 | Stop reason: SDUPTHER

## 2021-09-17 RX ORDER — CYCLOBENZAPRINE HCL 10 MG
10 TABLET ORAL NIGHTLY PRN
Qty: 30 TABLET | Refills: 2 | Status: SHIPPED | OUTPATIENT
Start: 2021-09-17 | End: 2021-10-11

## 2021-09-17 RX ORDER — LIDOCAINE AND PRILOCAINE 25; 25 MG/G; MG/G
CREAM TOPICAL
Qty: 60 G | Refills: 0 | Status: SHIPPED | OUTPATIENT
Start: 2021-09-17 | End: 2022-02-03 | Stop reason: SDUPTHER

## 2021-09-17 RX ORDER — ORPHENADRINE CITRATE 100 MG/1
100 TABLET, EXTENDED RELEASE ORAL 2 TIMES DAILY PRN
Qty: 60 TABLET | Refills: 0 | Status: SHIPPED | OUTPATIENT
Start: 2021-09-17 | End: 2021-10-11 | Stop reason: SDUPTHER

## 2021-09-29 ENCOUNTER — TELEPHONE (OUTPATIENT)
Dept: DIABETES | Facility: CLINIC | Age: 51
End: 2021-09-29

## 2021-09-30 ENCOUNTER — PATIENT MESSAGE (OUTPATIENT)
Dept: PSYCHIATRY | Facility: CLINIC | Age: 51
End: 2021-09-30

## 2021-10-11 ENCOUNTER — OFFICE VISIT (OUTPATIENT)
Dept: PSYCHIATRY | Facility: CLINIC | Age: 51
End: 2021-10-11
Payer: COMMERCIAL

## 2021-10-11 DIAGNOSIS — I10 ESSENTIAL HYPERTENSION: Chronic | ICD-10-CM

## 2021-10-11 DIAGNOSIS — F31.30 BIPOLAR DISORDER, MOST RECENT EPISODE DEPRESSED: Primary | ICD-10-CM

## 2021-10-11 DIAGNOSIS — E83.42 HYPOMAGNESEMIA: ICD-10-CM

## 2021-10-11 DIAGNOSIS — K59.00 CONSTIPATION, UNSPECIFIED CONSTIPATION TYPE: ICD-10-CM

## 2021-10-11 DIAGNOSIS — M79.18 MYOFASCIAL MUSCLE PAIN: ICD-10-CM

## 2021-10-11 DIAGNOSIS — F41.1 GENERALIZED ANXIETY DISORDER: ICD-10-CM

## 2021-10-11 PROCEDURE — 90834 PSYTX W PT 45 MINUTES: CPT | Mod: 95,,, | Performed by: SOCIAL WORKER

## 2021-10-11 PROCEDURE — 90834 PR PSYCHOTHERAPY W/PATIENT, 45 MIN: ICD-10-PCS | Mod: 95,,, | Performed by: SOCIAL WORKER

## 2021-10-11 RX ORDER — VALSARTAN 320 MG/1
320 TABLET ORAL DAILY
Qty: 90 TABLET | Refills: 3 | OUTPATIENT
Start: 2021-10-11 | End: 2022-10-11

## 2021-10-11 RX ORDER — DOCUSATE SODIUM 100 MG/1
100 CAPSULE, LIQUID FILLED ORAL 2 TIMES DAILY
Qty: 180 CAPSULE | Refills: 0 | Status: SHIPPED | OUTPATIENT
Start: 2021-10-11 | End: 2022-02-03 | Stop reason: SDUPTHER

## 2021-10-11 RX ORDER — ORPHENADRINE CITRATE 100 MG/1
100 TABLET, EXTENDED RELEASE ORAL 2 TIMES DAILY PRN
Qty: 60 TABLET | Refills: 0 | Status: SHIPPED | OUTPATIENT
Start: 2021-10-11 | End: 2021-11-16 | Stop reason: SDUPTHER

## 2021-10-12 ENCOUNTER — OFFICE VISIT (OUTPATIENT)
Dept: PSYCHIATRY | Facility: CLINIC | Age: 51
End: 2021-10-12
Payer: COMMERCIAL

## 2021-10-12 DIAGNOSIS — F41.1 GENERALIZED ANXIETY DISORDER: Chronic | ICD-10-CM

## 2021-10-12 DIAGNOSIS — F31.9 BIPOLAR I DISORDER, CURRENT EPISODE DEPRESSED: Primary | ICD-10-CM

## 2021-10-12 PROCEDURE — 99211 OFF/OP EST MAY X REQ PHY/QHP: CPT | Mod: PBBFAC | Performed by: PSYCHOLOGIST

## 2021-10-12 PROCEDURE — 99999 PR PBB SHADOW E&M-EST. PATIENT-LVL I: CPT | Mod: PBBFAC,,, | Performed by: PSYCHOLOGIST

## 2021-10-12 PROCEDURE — 99214 PR OFFICE/OUTPT VISIT, EST, LEVL IV, 30-39 MIN: ICD-10-PCS | Mod: S$GLB,,, | Performed by: PSYCHOLOGIST

## 2021-10-12 PROCEDURE — 99214 OFFICE O/P EST MOD 30 MIN: CPT | Mod: S$GLB,,, | Performed by: PSYCHOLOGIST

## 2021-10-12 PROCEDURE — 99999 PR PBB SHADOW E&M-EST. PATIENT-LVL I: ICD-10-PCS | Mod: PBBFAC,,, | Performed by: PSYCHOLOGIST

## 2021-10-12 RX ORDER — LANOLIN ALCOHOL/MO/W.PET/CERES
400 CREAM (GRAM) TOPICAL DAILY
Qty: 90 TABLET | Refills: 3 | OUTPATIENT
Start: 2021-10-12

## 2021-10-12 RX ORDER — CLONAZEPAM 1 MG/1
1.5 TABLET ORAL DAILY
Qty: 45 TABLET | Refills: 1 | Status: SHIPPED | OUTPATIENT
Start: 2021-10-12 | End: 2021-11-23 | Stop reason: SDUPTHER

## 2021-10-12 RX ORDER — LURASIDONE HYDROCHLORIDE 80 MG/1
80 TABLET, FILM COATED ORAL DAILY
Qty: 30 TABLET | Refills: 1 | Status: SHIPPED | OUTPATIENT
Start: 2021-10-12 | End: 2021-11-23 | Stop reason: SDUPTHER

## 2021-10-12 RX ORDER — DULOXETIN HYDROCHLORIDE 60 MG/1
60 CAPSULE, DELAYED RELEASE ORAL DAILY
Qty: 30 CAPSULE | Refills: 1 | Status: SHIPPED | OUTPATIENT
Start: 2021-10-12 | End: 2021-11-23 | Stop reason: SDUPTHER

## 2021-10-20 ENCOUNTER — CLINICAL SUPPORT (OUTPATIENT)
Dept: DIABETES | Facility: CLINIC | Age: 51
End: 2021-10-20
Payer: COMMERCIAL

## 2021-10-20 ENCOUNTER — OFFICE VISIT (OUTPATIENT)
Dept: OPHTHALMOLOGY | Facility: CLINIC | Age: 51
End: 2021-10-20
Payer: COMMERCIAL

## 2021-10-20 ENCOUNTER — TELEPHONE (OUTPATIENT)
Dept: DIABETES | Facility: CLINIC | Age: 51
End: 2021-10-20

## 2021-10-20 VITALS — HEIGHT: 56 IN | BODY MASS INDEX: 41.86 KG/M2 | WEIGHT: 186.06 LBS

## 2021-10-20 DIAGNOSIS — E11.36 DIABETIC CATARACT OF BOTH EYES: ICD-10-CM

## 2021-10-20 DIAGNOSIS — H04.129 DRY EYE: ICD-10-CM

## 2021-10-20 DIAGNOSIS — H25.13 NUCLEAR SCLEROSIS, BILATERAL: ICD-10-CM

## 2021-10-20 DIAGNOSIS — H52.4 MYOPIA WITH PRESBYOPIA, BILATERAL: ICD-10-CM

## 2021-10-20 DIAGNOSIS — E11.65 UNCONTROLLED TYPE 2 DIABETES MELLITUS WITH HYPERGLYCEMIA: ICD-10-CM

## 2021-10-20 DIAGNOSIS — E11.9 TYPE 2 DIABETES MELLITUS WITHOUT RETINOPATHY: Primary | ICD-10-CM

## 2021-10-20 DIAGNOSIS — H52.13 MYOPIA WITH PRESBYOPIA, BILATERAL: ICD-10-CM

## 2021-10-20 PROCEDURE — 99999 PR PBB SHADOW E&M-EST. PATIENT-LVL IV: ICD-10-PCS | Mod: PBBFAC,,, | Performed by: DIETITIAN, REGISTERED

## 2021-10-20 PROCEDURE — 92014 COMPRE OPH EXAM EST PT 1/>: CPT | Mod: S$GLB,,, | Performed by: OPTOMETRIST

## 2021-10-20 PROCEDURE — 92014 PR EYE EXAM, EST PATIENT,COMPREHESV: ICD-10-PCS | Mod: S$GLB,,, | Performed by: OPTOMETRIST

## 2021-10-20 PROCEDURE — 92015 PR REFRACTION: ICD-10-PCS | Mod: S$GLB,,, | Performed by: OPTOMETRIST

## 2021-10-20 PROCEDURE — G0108 DIAB MANAGE TRN  PER INDIV: HCPCS | Mod: PBBFAC | Performed by: DIETITIAN, REGISTERED

## 2021-10-20 PROCEDURE — 99214 OFFICE O/P EST MOD 30 MIN: CPT | Mod: PBBFAC,27 | Performed by: OPTOMETRIST

## 2021-10-20 PROCEDURE — 92015 DETERMINE REFRACTIVE STATE: CPT | Mod: S$GLB,,, | Performed by: OPTOMETRIST

## 2021-10-20 PROCEDURE — 99999 PR PBB SHADOW E&M-EST. PATIENT-LVL IV: CPT | Mod: PBBFAC,,, | Performed by: DIETITIAN, REGISTERED

## 2021-10-20 PROCEDURE — 99999 PR PBB SHADOW E&M-EST. PATIENT-LVL IV: CPT | Mod: PBBFAC,,, | Performed by: OPTOMETRIST

## 2021-10-20 PROCEDURE — 99214 OFFICE O/P EST MOD 30 MIN: CPT | Mod: PBBFAC | Performed by: DIETITIAN, REGISTERED

## 2021-10-20 PROCEDURE — 99999 PR PBB SHADOW E&M-EST. PATIENT-LVL IV: ICD-10-PCS | Mod: PBBFAC,,, | Performed by: OPTOMETRIST

## 2021-10-21 ENCOUNTER — PATIENT MESSAGE (OUTPATIENT)
Dept: CARDIOLOGY | Facility: CLINIC | Age: 51
End: 2021-10-21
Payer: COMMERCIAL

## 2021-10-21 ENCOUNTER — TELEPHONE (OUTPATIENT)
Dept: CARDIOLOGY | Facility: CLINIC | Age: 51
End: 2021-10-21

## 2021-10-21 ENCOUNTER — OFFICE VISIT (OUTPATIENT)
Dept: DIABETES | Facility: CLINIC | Age: 51
End: 2021-10-21
Payer: COMMERCIAL

## 2021-10-21 DIAGNOSIS — E11.65 TYPE 2 DIABETES MELLITUS WITH HYPERGLYCEMIA, WITH LONG-TERM CURRENT USE OF INSULIN: Chronic | ICD-10-CM

## 2021-10-21 DIAGNOSIS — E11.65 UNCONTROLLED TYPE 2 DIABETES MELLITUS WITH HYPERGLYCEMIA: ICD-10-CM

## 2021-10-21 DIAGNOSIS — Z79.4 TYPE 2 DIABETES MELLITUS WITH HYPERGLYCEMIA, WITH LONG-TERM CURRENT USE OF INSULIN: Chronic | ICD-10-CM

## 2021-10-21 PROCEDURE — 99213 OFFICE O/P EST LOW 20 MIN: CPT | Mod: 95,,, | Performed by: NURSE PRACTITIONER

## 2021-10-21 PROCEDURE — 99213 PR OFFICE/OUTPT VISIT, EST, LEVL III, 20-29 MIN: ICD-10-PCS | Mod: 95,,, | Performed by: NURSE PRACTITIONER

## 2021-10-21 RX ORDER — INSULIN GLARGINE 300 U/ML
80 INJECTION, SOLUTION SUBCUTANEOUS NIGHTLY
Qty: 4 PEN | Refills: 5 | Status: SHIPPED | OUTPATIENT
Start: 2021-10-21 | End: 2022-01-28 | Stop reason: SDUPTHER

## 2021-10-21 RX ORDER — VALSARTAN 320 MG/1
320 TABLET ORAL DAILY
COMMUNITY
Start: 2021-10-21 | End: 2021-10-21 | Stop reason: SDUPTHER

## 2021-10-21 RX ORDER — EXENATIDE 2 MG/.85ML
2 INJECTION, SUSPENSION, EXTENDED RELEASE SUBCUTANEOUS
Qty: 4 PEN | Refills: 5 | Status: SHIPPED | OUTPATIENT
Start: 2021-10-21 | End: 2022-01-28

## 2021-10-21 RX ORDER — VALSARTAN 320 MG/1
320 TABLET ORAL DAILY
Qty: 90 TABLET | Refills: 3 | Status: SHIPPED | OUTPATIENT
Start: 2021-10-21 | End: 2022-06-27 | Stop reason: SDUPTHER

## 2021-10-25 ENCOUNTER — PATIENT MESSAGE (OUTPATIENT)
Dept: PSYCHIATRY | Facility: CLINIC | Age: 51
End: 2021-10-25

## 2021-10-25 NOTE — ASSESSMENT & PLAN NOTE
Diabetes Management Status    Statin: Taking  ACE/ARB: Taking    Screening or Prevention Patient's value Goal Complete/Controlled?   HgA1C Testing and Control   Lab Results   Component Value Date    HGBA1C 9.9 (H) 11/18/2019      Annually/Less than 8% No   Lipid profile : 04/25/2019 Annually Yes   LDL control Lab Results   Component Value Date    LDLCALC 40.4 (L) 04/25/2019    Annually/Less than 100 mg/dl  Yes   Nephropathy screening Lab Results   Component Value Date    LABMICR 7.0 04/25/2019     Lab Results   Component Value Date    PROTEINUA Negative 08/26/2017    Annually Yes   Blood pressure BP Readings from Last 1 Encounters:   12/09/19 126/82    Less than 140/90 Yes   Dilated retinal exam : 05/22/2019 Annually Yes   Foot exam   : 05/16/2019 Annually Yes     Lab Results   Component Value Date    HGBA1C 9.9 (H) 11/18/2019    HGBA1C 8.4 (H) 10/14/2019    HGBA1C 7.7 (H) 06/24/2019    ESTGFRAFRICA >60 11/27/2019    EGFRNONAA >60 11/27/2019    MICALBCREAT 10.4 04/25/2019    LDLCALC 40.4 (L) 04/25/2019       HEALTH MAINTENANCE: Diabetic health maintenance interventions reviewed and are up to date except for:  There are no preventive care reminders to display for this patient.   
no

## 2021-10-27 ENCOUNTER — OFFICE VISIT (OUTPATIENT)
Dept: PRIMARY CARE CLINIC | Facility: CLINIC | Age: 51
End: 2021-10-27
Payer: MEDICAID

## 2021-10-27 ENCOUNTER — PATIENT MESSAGE (OUTPATIENT)
Dept: PRIMARY CARE CLINIC | Facility: CLINIC | Age: 51
End: 2021-10-27

## 2021-10-27 DIAGNOSIS — G43.019 INTRACTABLE MIGRAINE WITHOUT AURA AND WITHOUT STATUS MIGRAINOSUS: Primary | ICD-10-CM

## 2021-10-27 DIAGNOSIS — I88.9 LYMPHADENITIS: ICD-10-CM

## 2021-10-27 PROCEDURE — 99214 OFFICE O/P EST MOD 30 MIN: CPT | Mod: 95,,, | Performed by: FAMILY MEDICINE

## 2021-10-27 PROCEDURE — 99214 PR OFFICE/OUTPT VISIT, EST, LEVL IV, 30-39 MIN: ICD-10-PCS | Mod: 95,,, | Performed by: FAMILY MEDICINE

## 2021-10-27 RX ORDER — BUTALBITAL, ACETAMINOPHEN AND CAFFEINE 50; 325; 40 MG/1; MG/1; MG/1
1 TABLET ORAL EVERY 6 HOURS PRN
Qty: 30 TABLET | Refills: 0 | Status: SHIPPED | OUTPATIENT
Start: 2021-10-27 | End: 2021-11-26

## 2021-10-27 RX ORDER — CLINDAMYCIN HYDROCHLORIDE 150 MG/1
150 CAPSULE ORAL EVERY 8 HOURS
Qty: 21 CAPSULE | Refills: 0 | Status: SHIPPED | OUTPATIENT
Start: 2021-10-27 | End: 2021-11-03

## 2021-10-27 RX ORDER — PROMETHAZINE HYDROCHLORIDE 25 MG/1
25 TABLET ORAL EVERY 8 HOURS PRN
Qty: 30 TABLET | Refills: 0 | Status: SHIPPED | OUTPATIENT
Start: 2021-10-27 | End: 2021-11-26

## 2021-10-27 RX ORDER — CLINDAMYCIN HYDROCHLORIDE 300 MG/1
300 CAPSULE ORAL EVERY 8 HOURS
Qty: 21 CAPSULE | Refills: 0 | Status: SHIPPED | OUTPATIENT
Start: 2021-10-27 | End: 2021-11-03

## 2021-11-16 DIAGNOSIS — G43.109 MIGRAINE WITH AURA AND WITHOUT STATUS MIGRAINOSUS, NOT INTRACTABLE: Chronic | ICD-10-CM

## 2021-11-16 DIAGNOSIS — M79.18 MYOFASCIAL MUSCLE PAIN: ICD-10-CM

## 2021-11-16 DIAGNOSIS — G89.29 CHRONIC PAIN OF BOTH SHOULDERS: ICD-10-CM

## 2021-11-16 DIAGNOSIS — M25.512 CHRONIC PAIN OF BOTH SHOULDERS: ICD-10-CM

## 2021-11-16 DIAGNOSIS — M47.816 LUMBAR SPONDYLOSIS: ICD-10-CM

## 2021-11-16 DIAGNOSIS — M25.511 CHRONIC PAIN OF BOTH SHOULDERS: ICD-10-CM

## 2021-11-16 RX ORDER — PEN NEEDLE, DIABETIC 30 GX3/16"
NEEDLE, DISPOSABLE MISCELLANEOUS
Qty: 200 EACH | Refills: 3 | Status: SHIPPED | OUTPATIENT
Start: 2021-11-16 | End: 2022-08-26 | Stop reason: SDUPTHER

## 2021-11-16 RX ORDER — ORPHENADRINE CITRATE 100 MG/1
100 TABLET, EXTENDED RELEASE ORAL 2 TIMES DAILY PRN
Qty: 60 TABLET | Refills: 0 | Status: SHIPPED | OUTPATIENT
Start: 2021-11-16 | End: 2022-01-10 | Stop reason: SDUPTHER

## 2021-11-16 RX ORDER — DICLOFENAC SODIUM 10 MG/G
2 GEL TOPICAL 4 TIMES DAILY PRN
Qty: 5 EACH | Refills: 0 | Status: SHIPPED | OUTPATIENT
Start: 2021-11-16 | End: 2022-02-03 | Stop reason: SDUPTHER

## 2021-11-19 RX ORDER — FROVATRIPTAN SUCCINATE 2.5 MG/1
TABLET, FILM COATED ORAL
Qty: 9 TABLET | Refills: 1 | Status: SHIPPED | OUTPATIENT
Start: 2021-11-19 | End: 2022-03-10 | Stop reason: SDUPTHER

## 2021-11-23 ENCOUNTER — OFFICE VISIT (OUTPATIENT)
Dept: PSYCHIATRY | Facility: CLINIC | Age: 51
End: 2021-11-23
Payer: COMMERCIAL

## 2021-11-23 DIAGNOSIS — F31.9 BIPOLAR I DISORDER, CURRENT EPISODE DEPRESSED: Primary | ICD-10-CM

## 2021-11-23 DIAGNOSIS — F41.1 GENERALIZED ANXIETY DISORDER: Chronic | ICD-10-CM

## 2021-11-23 PROCEDURE — 99211 OFF/OP EST MAY X REQ PHY/QHP: CPT | Mod: PBBFAC | Performed by: PSYCHOLOGIST

## 2021-11-23 PROCEDURE — 99999 PR PBB SHADOW E&M-EST. PATIENT-LVL I: CPT | Mod: PBBFAC,,, | Performed by: PSYCHOLOGIST

## 2021-11-23 PROCEDURE — 99214 OFFICE O/P EST MOD 30 MIN: CPT | Mod: S$GLB,,, | Performed by: PSYCHOLOGIST

## 2021-11-23 PROCEDURE — 99999 PR PBB SHADOW E&M-EST. PATIENT-LVL I: ICD-10-PCS | Mod: PBBFAC,,, | Performed by: PSYCHOLOGIST

## 2021-11-23 PROCEDURE — 99214 PR OFFICE/OUTPT VISIT, EST, LEVL IV, 30-39 MIN: ICD-10-PCS | Mod: S$GLB,,, | Performed by: PSYCHOLOGIST

## 2021-11-23 RX ORDER — LURASIDONE HYDROCHLORIDE 80 MG/1
80 TABLET, FILM COATED ORAL DAILY
Qty: 30 TABLET | Refills: 1 | Status: SHIPPED | OUTPATIENT
Start: 2021-11-23 | End: 2022-02-03 | Stop reason: SDUPTHER

## 2021-11-23 RX ORDER — INSULIN ASPART 100 [IU]/ML
INJECTION, SOLUTION INTRAVENOUS; SUBCUTANEOUS
COMMUNITY
End: 2022-01-28 | Stop reason: SDUPTHER

## 2021-11-23 RX ORDER — CLONAZEPAM 1 MG/1
1 TABLET ORAL DAILY
Qty: 30 TABLET | Refills: 1 | Status: SHIPPED | OUTPATIENT
Start: 2021-11-23 | End: 2021-12-09 | Stop reason: SDUPTHER

## 2021-11-23 RX ORDER — DULOXETIN HYDROCHLORIDE 60 MG/1
60 CAPSULE, DELAYED RELEASE ORAL DAILY
Qty: 30 CAPSULE | Refills: 1 | Status: SHIPPED | OUTPATIENT
Start: 2021-11-23 | End: 2022-01-14 | Stop reason: SDUPTHER

## 2021-12-09 ENCOUNTER — TELEPHONE (OUTPATIENT)
Dept: PSYCHIATRY | Facility: CLINIC | Age: 51
End: 2021-12-09
Payer: COMMERCIAL

## 2021-12-09 ENCOUNTER — OFFICE VISIT (OUTPATIENT)
Dept: PSYCHIATRY | Facility: CLINIC | Age: 51
End: 2021-12-09
Payer: COMMERCIAL

## 2021-12-09 DIAGNOSIS — F41.1 GENERALIZED ANXIETY DISORDER: Chronic | ICD-10-CM

## 2021-12-09 DIAGNOSIS — F43.21 GRIEF: ICD-10-CM

## 2021-12-09 DIAGNOSIS — F41.1 GENERALIZED ANXIETY DISORDER: ICD-10-CM

## 2021-12-09 DIAGNOSIS — F31.9 BIPOLAR I DISORDER, CURRENT EPISODE DEPRESSED: Primary | ICD-10-CM

## 2021-12-09 DIAGNOSIS — F31.5: Primary | ICD-10-CM

## 2021-12-09 PROCEDURE — 4010F ACE/ARB THERAPY RXD/TAKEN: CPT | Mod: HP,HB,CPTII,95 | Performed by: PSYCHOLOGIST

## 2021-12-09 PROCEDURE — 4010F PR ACE/ARB THEARPY RXD/TAKEN: ICD-10-PCS | Mod: AJ,HB,CPTII,95 | Performed by: SOCIAL WORKER

## 2021-12-09 PROCEDURE — 3072F LOW RISK FOR RETINOPATHY: CPT | Mod: HP,HB,CPTII,95 | Performed by: PSYCHOLOGIST

## 2021-12-09 PROCEDURE — 4010F ACE/ARB THERAPY RXD/TAKEN: CPT | Mod: AJ,HB,CPTII,95 | Performed by: SOCIAL WORKER

## 2021-12-09 PROCEDURE — 99214 OFFICE O/P EST MOD 30 MIN: CPT | Mod: HP,HB,95, | Performed by: PSYCHOLOGIST

## 2021-12-09 PROCEDURE — 4010F PR ACE/ARB THEARPY RXD/TAKEN: ICD-10-PCS | Mod: HP,HB,CPTII,95 | Performed by: PSYCHOLOGIST

## 2021-12-09 PROCEDURE — 3072F PR LOW RISK FOR RETINOPATHY: ICD-10-PCS | Mod: AJ,HB,CPTII,95 | Performed by: SOCIAL WORKER

## 2021-12-09 PROCEDURE — 99214 PR OFFICE/OUTPT VISIT, EST, LEVL IV, 30-39 MIN: ICD-10-PCS | Mod: HP,HB,95, | Performed by: PSYCHOLOGIST

## 2021-12-09 PROCEDURE — 3072F PR LOW RISK FOR RETINOPATHY: ICD-10-PCS | Mod: HP,HB,CPTII,95 | Performed by: PSYCHOLOGIST

## 2021-12-09 PROCEDURE — 90832 PR PSYCHOTHERAPY W/PATIENT, 30 MIN: ICD-10-PCS | Mod: AJ,HB,95, | Performed by: SOCIAL WORKER

## 2021-12-09 PROCEDURE — 90832 PSYTX W PT 30 MINUTES: CPT | Mod: AJ,HB,95, | Performed by: SOCIAL WORKER

## 2021-12-09 PROCEDURE — 3072F LOW RISK FOR RETINOPATHY: CPT | Mod: AJ,HB,CPTII,95 | Performed by: SOCIAL WORKER

## 2021-12-09 RX ORDER — CLONAZEPAM 1 MG/1
1 TABLET ORAL 2 TIMES DAILY
Qty: 60 TABLET | Refills: 1 | Status: SHIPPED | OUTPATIENT
Start: 2021-12-09 | End: 2022-02-23 | Stop reason: SDUPTHER

## 2021-12-14 ENCOUNTER — PATIENT MESSAGE (OUTPATIENT)
Dept: PSYCHIATRY | Facility: CLINIC | Age: 51
End: 2021-12-14
Payer: COMMERCIAL

## 2021-12-14 ENCOUNTER — TELEPHONE (OUTPATIENT)
Dept: PSYCHIATRY | Facility: CLINIC | Age: 51
End: 2021-12-14
Payer: COMMERCIAL

## 2021-12-20 ENCOUNTER — OFFICE VISIT (OUTPATIENT)
Dept: PSYCHIATRY | Facility: CLINIC | Age: 51
End: 2021-12-20
Payer: MEDICAID

## 2021-12-20 DIAGNOSIS — F43.21 GRIEF: ICD-10-CM

## 2021-12-20 DIAGNOSIS — F41.1 GENERALIZED ANXIETY DISORDER: ICD-10-CM

## 2021-12-20 DIAGNOSIS — F31.9 BIPOLAR I DISORDER, CURRENT EPISODE DEPRESSED: Primary | ICD-10-CM

## 2021-12-20 PROCEDURE — 4010F PR ACE/ARB THEARPY RXD/TAKEN: ICD-10-PCS | Mod: AJ,HB,CPTII,95 | Performed by: SOCIAL WORKER

## 2021-12-20 PROCEDURE — 3072F PR LOW RISK FOR RETINOPATHY: ICD-10-PCS | Mod: AJ,HB,CPTII,95 | Performed by: SOCIAL WORKER

## 2021-12-20 PROCEDURE — 90832 PR PSYCHOTHERAPY W/PATIENT, 30 MIN: ICD-10-PCS | Mod: AJ,HB,95, | Performed by: SOCIAL WORKER

## 2021-12-20 PROCEDURE — 90832 PSYTX W PT 30 MINUTES: CPT | Mod: AJ,HB,95, | Performed by: SOCIAL WORKER

## 2021-12-20 PROCEDURE — 3072F LOW RISK FOR RETINOPATHY: CPT | Mod: AJ,HB,CPTII,95 | Performed by: SOCIAL WORKER

## 2021-12-20 PROCEDURE — 4010F ACE/ARB THERAPY RXD/TAKEN: CPT | Mod: AJ,HB,CPTII,95 | Performed by: SOCIAL WORKER

## 2021-12-23 DIAGNOSIS — E87.5 HYPERKALEMIA: ICD-10-CM

## 2021-12-23 DIAGNOSIS — E26.9 HYPERALDOSTERONISM: ICD-10-CM

## 2021-12-23 DIAGNOSIS — I10 ESSENTIAL HYPERTENSION: Chronic | ICD-10-CM

## 2021-12-23 RX ORDER — FUROSEMIDE 20 MG/1
20 TABLET ORAL DAILY
Qty: 30 TABLET | Refills: 11 | Status: SHIPPED | OUTPATIENT
Start: 2021-12-23 | End: 2022-12-22 | Stop reason: SDUPTHER

## 2021-12-23 RX ORDER — SPIRONOLACTONE 25 MG/1
25 TABLET ORAL DAILY
Qty: 30 TABLET | Refills: 6 | Status: SHIPPED | OUTPATIENT
Start: 2021-12-23 | End: 2022-12-22 | Stop reason: SDUPTHER

## 2021-12-30 ENCOUNTER — TELEPHONE (OUTPATIENT)
Dept: OBSTETRICS AND GYNECOLOGY | Facility: CLINIC | Age: 51
End: 2021-12-30
Payer: COMMERCIAL

## 2021-12-30 RX ORDER — FENOFIBRATE 54 MG/1
54 TABLET ORAL DAILY
Qty: 30 TABLET | Refills: 3 | Status: SHIPPED | OUTPATIENT
Start: 2021-12-30 | End: 2022-06-09 | Stop reason: SDUPTHER

## 2021-12-30 RX ORDER — FLUCONAZOLE 150 MG/1
TABLET ORAL
Qty: 2 TABLET | Refills: 1 | Status: SHIPPED | OUTPATIENT
Start: 2021-12-30 | End: 2022-02-23

## 2022-01-03 ENCOUNTER — TELEPHONE (OUTPATIENT)
Dept: PULMONOLOGY | Facility: CLINIC | Age: 52
End: 2022-01-03
Payer: MEDICAID

## 2022-01-03 NOTE — TELEPHONE ENCOUNTER
Chronic Disease Management:   Called patient to confirm Pulmonary Disease Management appointment scheduled on 1/4/22 beginning at 8:00 AM at [facility]. Patient confirmed. Advised patient to bring respiratory inhalers to visit.

## 2022-01-04 ENCOUNTER — CLINICAL SUPPORT (OUTPATIENT)
Dept: PULMONOLOGY | Facility: CLINIC | Age: 52
End: 2022-01-04
Payer: MEDICAID

## 2022-01-04 VITALS
HEIGHT: 56 IN | WEIGHT: 181.44 LBS | OXYGEN SATURATION: 98 % | RESPIRATION RATE: 16 BRPM | BODY MASS INDEX: 40.82 KG/M2 | HEART RATE: 98 BPM

## 2022-01-04 DIAGNOSIS — J45.909 ASTHMA: Primary | ICD-10-CM

## 2022-01-04 PROCEDURE — 99999 PR PBB SHADOW E&M-EST. PATIENT-LVL III: CPT | Mod: PBBFAC,,,

## 2022-01-04 PROCEDURE — 99213 OFFICE O/P EST LOW 20 MIN: CPT | Mod: PBBFAC

## 2022-01-04 PROCEDURE — 99999 PR PBB SHADOW E&M-EST. PATIENT-LVL III: ICD-10-PCS | Mod: PBBFAC,,,

## 2022-01-04 NOTE — PROGRESS NOTES
"Pulmonary Disease Management  [facility]  Follow up Visit  Chronic Care Management    Yolanda Betts  was seen 1/4/2022  8:00 AM in the Pulmonary Disease Management Clinic for evaluation, education, reinforcement of self management techniques and exacerbation action plan.    Toño Villalta    Past Medical History:   Diagnosis Date    Abnormal Pap smear of cervix     HPV genital warts    Anemia     Anxiety     Arthritis     Asthma 10/11/2016    Bipolar 1 disorder     Diabetes mellitus, type 2     Dyslipidemia associated with type 2 diabetes mellitus 5/13/2019    General anesthetics causing adverse effect in therapeutic use     Genital warts     GERD (gastroesophageal reflux disease)     Herpes simplex virus (HSV) infection     Hyperlipidemia     Hypertension     Hypertension complicating diabetes 5/5/2019    Migraine with aura and without status migrainosus, not intractable 3/21/2016    Mild persistent asthma without complication 10/11/2016    Morbid obesity with body mass index (BMI) of 45.0 to 49.9 in adult 8/3/2017    Obstructive sleep apnea     ALEN (obstructive sleep apnea)     2L per N/C q HS    Schizoaffective disorder, bipolar type 4/25/2019    Seasonal allergic rhinitis due to pollen 5/13/2019    Type 2 diabetes mellitus with hyperglycemia, with long-term current use of insulin 12/21/2017    Type 2 diabetes mellitus with hyperglycemia, without long-term current use of insulin 12/21/2017       Patient's Medications   New Prescriptions    No medications on file   Previous Medications    AMLODIPINE (NORVASC) 10 MG TABLET    Take 1 tablet (10 mg total) by mouth once daily.    AMLODIPINE (NORVASC) 5 MG TABLET    Take 1 tablet (5 mg total) by mouth every evening.    ASPIRIN (ECOTRIN) 81 MG EC TABLET    Take 81 mg by mouth once daily.    BD INSULIN SYRINGE ULTRA-FINE 1/2 ML 30 GAUGE X 1/2" SYRG        BLOOD SUGAR DIAGNOSTIC STRP    Check blood glucose 4 times daily as directed " and as needed (dispense insurance preferred brand or patient choice)    BLOOD SUGAR DIAGNOSTIC STRP    1 each by Misc.(Non-Drug; Combo Route) route 4 (four) times daily. [facility] Glucose Test Strips    BLOOD-GLUCOSE METER MISC    Use as directed    BUDESONIDE-FORMOTEROL 160-4.5 MCG (SYMBICORT) 160-4.5 MCG/ACTUATION HFAA    Inhale 2 puffs into the lungs every 12 (twelve) hours. Controller : Use spacer    CLONAZEPAM (KLONOPIN) 1 MG TABLET    Take 1 tablet (1 mg total) by mouth 2 (two) times daily.    CYCLOSPORINE 0.05 % DROP    Place 1 drop into both eyes 2 (two) times daily.    DICLOFENAC SODIUM (VOLTAREN) 1 % GEL    Apply 2 grams topically 4 (four) times daily as needed.    DOCUSATE SODIUM (COLACE) 100 MG CAPSULE    Take 1 capsule (100 mg total) by mouth 2 (two) times daily.    DULOXETINE (CYMBALTA) 60 MG CAPSULE    Take 1 capsule (60 mg total) by mouth once daily.    EXENATIDE MICROSPHERES (BYDUREON BCISE) 2 MG/0.85 ML ATIN    Inject 2 mg into the skin every 7 days.    FENOFIBRATE (TRICOR) 54 MG TABLET    Take 1 tablet (54 mg total) by mouth once daily.    FISH OIL-OMEGA-3 FATTY ACIDS 300-1,000 MG CAPSULE    Take 1 capsule by mouth once daily.    FLASH GLUCOSE SCANNING READER (FREESTYLE AMY 2 READER) [facility]    Use as directed to check blood sugar    FLUCONAZOLE (DIFLUCAN) 150 MG TAB    Take one tablet and repeat dose in 3 days    FROVATRIPTAN (FROVA) 2.5 MG TABLET    Take 1 tablet at onset of acute migraine. May take 1 more tablet 2 hours later if needed. (MAX 2 TABLET PER DAY. MAX 4 TABLETS PER WEEK.)    FUROSEMIDE (LASIX) 20 MG TABLET    Take 1 tablet (20 mg total) by mouth once daily.    INSULIN ASPART U-100 (NOVOLOG) 100 UNIT/ML INJECTION    Novolog Take No date recorded No form recorded No frequency recorded No route recorded No set duration recorded No set duration amount recorded active No dosage strength recorded No dosage strength units of measure recorded    INSULIN GLARGINE U-300 CONC (TOUJEO MAX U-300  "SOLOSTAR) 300 UNIT/ML (3 ML) INSULIN PEN    Inject 80 Units into the skin every evening.    LANCETS 30 GAUGE MISC    Use as directed 3 times daily    LIDOCAINE-PRILOCAINE (EMLA) CREAM    Apply topically up to 4 times per day to affected area for pain relief    LURASIDONE (LATUDA) 80 MG TAB TABLET    Take 1 tablet (80 mg total) by mouth once daily.    MAGNESIUM OXIDE (MAG-OX) 400 MG (241.3 MG MAGNESIUM) TABLET    Take 1 tablet (400 mg total) by mouth once daily.    METFORMIN (GLUCOPHAGE) 1000 MG TABLET    Take 1 tablet (1,000 mg total) by mouth 2 (two) times daily with meals.    MILNACIPRAN (SAVELLA) 25 MG TAB TABLET    Take 1 tablet (25 mg total) by mouth 2 (two) times daily.    MULTIVIT,CALC,MINS/IRON/FOLIC (DAILY MULTIPLE FOR WOMEN ORAL)    Take 1 tablet by mouth once daily.    MUPIROCIN (BACTROBAN) 2 % OINTMENT    Apply topically 2 (two) times daily.    NYSTATIN (MYCOSTATIN) CREAM    Apply topically 2 (two) times daily.    OMEPRAZOLE (PRILOSEC) 20 MG CAPSULE    Take 1 capsule by mouth daily    ORPHENADRINE (NORFLEX) 100 MG TABLET    Take 1 tablet (100 mg total) by mouth 2 (two) times daily as needed for Muscle spasms.    PEN NEEDLE, DIABETIC (BD ULTRA-FINE MINI PEN NEEDLE) 31 GAUGE X 3/16" NDLE    Use 5 a day    PEN NEEDLE, DIABETIC (BD ULTRA-FINE MINI PEN NEEDLE) 31 GAUGE X 3/16" NDLE    Use one needle 5 times a day    SPIRONOLACTONE (ALDACTONE) 25 MG TABLET    Take 1 tablet (25 mg total) by mouth once daily.    URSODIOL (ACTIGALL) 300 MG CAPSULE    Take 1 capsule by mouth 2 (two) times daily for 180 days.    VALSARTAN (DIOVAN) 320 MG TABLET    Take 1 tablet (320 mg total) by mouth once daily.   Modified Medications    No medications on file   Discontinued Medications    No medications on file         Educational assessment:   [x]            Good  []            Fair  []            Poor    Readiness to learn:   [x]            Good  []            Fair  []            Poor    Vision Status:   [x]            Good  [] "            Fair  []            Poor    Reading Ability:  [x]            Good  []            Fair  []            Poor    Knowledge of condition:   [x]            Good  []            Fair  []            Poor    Language Barriers:   []            Good  []            Fair  []            Poor  [x]            None    Cognitive/ Physical Barriers:   []            Good  []            Fair  []            Poor  [x]            None    Learning best by:                       [x]            Seeing  []            Hearing  []            Reading                         [x]            Doing    Describe any barrier /Limitation or financial implications of care choices identified     []            Financial  []            Emotional  []            Education  []            Vision/Hearing  []            Physical  [x]            None  []                TOPIC /CONTENT FOR TODAY:    [x]            MDI with or without spacer  [x]            Dry powder inhaler  []            Acapella   []           Peak Flow meter  [x]            Asthma action plan  []            Nebulizer use  [x]            Oxygen use safety  [x]            Breathing and cough techniques  [x]            Energy conservation  [x]            Infection prevention  []           OTHER________________________        Learner:    [x]            Patient   []            Caregiver    Method:    [x]            Verbal explanation  [x]            Audio visual    [x]            Literature  [x]            Teach back      Evaluation:    [x]            Teach back  [x]            Demonstrate  [x]            Follow up phone call    []            2 weeks     [x]            4 weeks   [x]            PRN    Additional comments:    Reviewed respiratory medication purpose and proper technique for use of inhalers. Patient practiced proper technique using MDI with valved holding chamber (spacer). Provided patient with new spacer. Patient demonstrated understanding. Literature was given to  patient.    Patient reports she has not taken Symbicort in over a year. Patient reveals symptoms are controlled and she rarely utilizes rescue medication. Advised patient of recommendations per provider visit in 8/2021 to continue with controller medication. Patient stated understanding. Reminded patient to rinse mouth thoroughly after ICS use.      Asthma trigger checklist was verbally reviewed and literature given to patient.     Infection prevention was discussed. Patient was reminded to get influenza vaccine. Hand hygiene and cleaning of respiratory equipment was also discussed. Patient verbalized understanding.       Asthma action plan was reviewed and literature was given to patient. Patient verbalized understanding.     Plan:  Monthly Pulmonary Disease Management Questionnaire  Follow-up PDM appointment scheduled for 4/27/22 at 8:30 AM at [facility]    Reinforce education  Meds: Symbicort, albuterol   DME Needs: [facility]   Action Plan: Asthma   Immunization: Pneumococcal- current, Flu-DUE , Covid 19- current  Next Provider Visit: Not scheduled. Message sent to provider staff to have visit and testing scheduled  Next Spirometry/CPFT: Not scheduled   Approximate time spent with patient: 30 mins

## 2022-01-04 NOTE — PATIENT INSTRUCTIONS
Understanding Asthma         Asthma is a long-term (chronic) lung condition. It involves the airways (bronchial tubes). It happens when a trigger causes your airways to swell and become narrow. The muscles around your airways start to tighten. When your airways start to narrow, air can't move in and out of your lungs very well. Mucus also builds up along the airways. This makes it even harder to move air in and out of your lungs.  Experts are not exactly sure what causes asthma. It may be caused by a mix of inherited and environmental factors. People with asthma may have no symptoms until they are exposed to an allergen or trigger.  Healthy lungs  Inside your lungs there are branching airways made of stretchy tissue. Each airway is wrapped with bands of muscle. The airways are smaller as they go deeper into the lungs. The smallest airways end in clusters of tiny balloon-like air sacs (alveoli). These clusters are surrounded by blood vessels. When you breathe in (inhale), air enters the lungs. It travels down through the airways until it reaches the air sacs. When you breathe out (exhale), air travels up through the airways and out of the lungs. The airways make mucus that traps particles you breathe in. Normally, the mucus is then swept out of the lungs by tiny hairs (cilia) that line the airways. The mucus is swallowed or coughed up during your day.    What the lungs do  The air you inhale contains oxygen. When oxygen reaches the air sacs, it passes into the blood vessels around the sacs. Your blood then sends oxygen to all of your cells. As you exhale, carbon dioxide is removed in a similar way from the blood in the air sacs, and from your body.    When you have asthma  People with asthma have very sensitive airways. This means the airways react to certain things called triggers. Triggers can include pollen, dust, or smoke. Triggers cause inflammation. This makes the airways swell and become narrow. This is a  long-lasting (chronic) problem. Your airways may not always be narrow enough so that you notice breathing problems.  Symptoms of chronic inflammation include:   Coughing (chronic)   A feeling of tightness in your chest   Feeling short of breath   Wheezing (a whistling noise, especially when breathing out)   Low energy or feeling tired   In some people, over time chronic mild inflammation can lead to lasting (permanent) scarring of airways and loss of lung function.    Asthma flare-ups  When sensitive airways are irritated by a trigger, the muscles around the airways tighten. The lining of the airways swells. Thick, sticky mucus increases and partly clogs the airways. All of this makes it harder to breathe.  Symptoms of flare-ups may include:  Coughing, especially at night. You may not be able to sleep because of coughing.   Getting tired or out of breath easily   Wheezing   Chest tightness   Faster breathing when at rest   Flare-ups can be life-threatening. In a severe flare-up, the muscle tightening, swelling, and mucus are worse. Its very hard to breathe. Your body can't get enough oxygen and can't remove carbon dioxide. Waste gas is trapped in the alveoli. Gas exchange cant occur. The body is not getting enough oxygen. Without oxygen, body tissues, especially brain tissue, begin to get damaged. If this goes on for long, it can lead to severe brain damage or death.  Call 911 (or have someone call for you) if you have any of these symptoms and they are not relieved right away by taking your quick-relief medicine as prescribed:  Trouble breathing   Feeling too short of breath to talk or walk   Lips or fingers turning blue   Feeling lightheaded or dizzy, as though you are about to pass out   Peak flow less than 50% of your personal best, if you use a peak flow meter     Managing your asthma  Asthma is a long-term condition. So its important to work with your healthcare provider to manage it. If you have asthma,  you can prevent flare-ups. Develop an asthma action plan with your healthcare provider. It can help control your asthma and manage your symptoms. An asthma action plan also tells you and your family or friends what to do if your asthma flares up or gets worse.  Take your medicine as prescribed. Also learn about your asthma triggers. Knowing what causes your asthma to flare up in the first place can help you prevent future breathing problems.  If you smoke, get help to quit.  Asthma Trigger Checklist  Allergens, irritants, and other things may trigger your asthma. Check the box next to each of your triggers. After each trigger is a list of ways to avoid it.   Dust mites. Dust mites live in mattresses, bedding, carpets, curtains, and indoor dust.   To kill dust mites, wash bedding in hot water (130°F) each week.   Cover mattress and pillows with special dust-mite-proof cases.   Don't use upholstered furniture like sofas or chairs in the bedroom.   Use allergy-proof filters for air conditioners and furnaces. Replace or clean them as instructed.   If you can, replace carpeting with wood or tile marisela, especially in the bedroom.  Animals. Animals with fur or feathers shed dander (allergens).   It's best to choose a pet that doesn't have fur or feathers, such as a fish or a reptile.   If you have pets, keep them off your bed and out of your bedroom.   Wash your hands and clothes after handling pets.    Mold. Mold grows in damp places, such as bathrooms, basements, and closets.   Ask someone to clean damp areas in your home every week. Or try wearing a face mask while you clean.   Run an exhaust fan while bathing. Or leave a window open in the bathroom.   Repair water leaks in or around your home.   Have someone else cut grass or rake leaves, if possible.   Don't use vaporizers or humidifiers. They encourage mold growth.    Pollen. Pollen from trees, grasses, and weeds is a common allergen. (Flower pollens  are generally not a problem).   Try to learn what types of pollen affect you most. Pollen levels vary depending on the plant, the season, and the time of day.   If possible, use air conditioning instead of opening the windows in your home or car.   Have someone else do yard work, if possible.    Cockroaches. Roaches are found in many homes and produce allergens.   Keep your kitchen clean and dry. A leaky faucet or drain can attract roaches.   Remove garbage from your home daily.   Store food in tightly sealed containers. Wash dishes as soon as they are used.   Use bait stations or traps to control roaches. Avoid using chemical sprays.    Smoke. Smoke may be from cigarettes, cigars, pipes, incense or candles, barbecues or grills, and fireplaces.   Don't smoke. And don't let people smoke in your home or car.   When you travel, ask for nonsmoking rental cars and hotel rooms.   Avoid fireplaces and wood stoves. If you can't, sit away from them. Make sure the smoke is directed outside.   Don't burn incense or use candles.   Move away from smoky outdoor cooking grills.    Smog.  Smog is from car exhaust and other pollution.   Read or listen to local air-quality reports. These let you know when air quality is poor.   Stay indoors as much as you can on smoggy days. If possible, use air conditioning instead of opening the windows.   In your car, set air conditioning to recirculate air, so less pollution gets in.    Strong odors. These include air fresheners, deodorizers, and cleaning products; perfume, deodorant, and other beauty products; incense and candles; and insect sprays and other sprays.   Use scent-free products like deodorant or body lotion.   Avoid using cleaning products with bleach and ammonia. Make your own cleaning solution with white vinegar, baking soda, or mild dish soap.   Use exhaust fans while cooking. Or open a window, if possible.    Avoid perfumes, air fresheners, potpourri, and  other scented products.          Other irritants. These include dust, aerosol sprays, and powders.   Wear a face mask while doing tasks like sanding, dusting, sweeping, and yard work. Open doors and windows if working indoors.   Use pump spray bottles instead of aerosols.   Pour liquid  onto a rag or cloth instead of spraying them.    Weather. Weather conditions can trigger symptoms or make them worse.   Watch for very high or low temperatures, very humid conditions, or a lot of wind, as these conditions can make symptoms worse.   Limit outdoor activity during the type of weather that affects you.   Wear a scarf over your mouth and nose in cold weather.    Colds, flu, and sinus infections. Upper respiratory infections can trigger asthma.   Wash your hands often with soap and warm water or use a hand  containing alcohol.   Get a yearly flu shot. And ask if you should get a pneumonia vaccine.   Take care of your general health. Get plenty of sleep. And eat a variety of healthy foods.    Food additives. Food additives can trigger asthma flare-ups in some people.   Check food labels for sulfites or other similar ingredients. These are often found in foods such as wine, beer, and dried fruits.   Avoid foods that contain these additives.    Medicine. Aspirin, NSAIDS like ibuprofen and naproxen, and heart medicines like beta-blockers may be triggers.   Tell your health care provider if you think certain medicines trigger symptoms.    Be sure to read the labels on over-the-counter medicines. They may have ingredients that cause symptoms for you.   , Emotions. Laughing, crying, or feeling excited are triggers for some people.    To help you stay calm: Try breathing in slowly through your nose for a count of 2 seconds. Close your lips and breathe out for 4 seconds. Repeat.   Try to focus on a soothing image in your mind. This will help relax you and calm your breathing.   Remember to take your  daily controller medicines. When you're upset or under stress, it's easy to forget.    Exercise. For some people, exercise can trigger symptoms. Don't let this keep you from being active.    If you have not been exercising regularly, start slow and work up gradually.   Take all of your medicines as prescribed.   If you use quick-relief medicine, make sure you have it with you when you exercise.   Stop if you have any symptoms. Make sure you talk with your provider about these symptoms.  © 2770-3383 [employer]. [address] [place] [address], [place] [address]. All rights reserved. This information is not intended as a substitute for professional medical care. Always follow your healthcare professional's instructions.          Coronavirus Disease 2019 (COVID-19): Prevention  The best prevention is to have no contact with the SARS-CoV-2 virus. The FDA has approved vaccines to prevent COVID-19 in people older than 18 (one vaccine has been approved for people as young as 16). Pregnant or breastfeeding people may choose to be vaccinated. Expert groups, including ACOG and the CDC, advise pregnant or breastfeeding people to talk with their healthcare provider about the vaccine.   The vaccines are being rolled out to the public in phases. Check your local health department for your community's roll-out plans. The vaccines are given as a shot (injection) in the arm muscle. A1-dose or 2-dose vaccine may be given. If you get the 2-dose vaccine, the second dose is given several weeks after the first.   During a pandemic, it's especially important to keep up on recommended vaccines for other illnesses. This is more true if you're at higher risk for severe illness from COVID-19, the flu, or pneumonia. This includes older adults and those who have long-term (chronic) health conditions. Getting a yearly flu vaccine is advised for everyone 6 months old and older, with rare exceptions. Health experts advise the flu vaccine  "to protect you and others. The flu vaccine helps protect those at high-risk for serious illness, and lowers the strain on hospitals during the COVID-19 pandemic.   Canceling travel and other outings  Stay informed about COVID-19 in your area. Follow local instructions about being in public. Be aware of events in your community that may be postponed or canceled, such as school and sporting events. You may be advised not to attend public gatherings.    You will be advised to stay at least 6 feet from others as much as possible. This is called "social distancing." You may be advised to stay at home and isolate yourself as much as possible if COVID-19 is in your area. You may hear terms such as "self isolate, "quarantine," stay at home, and shelter in place.   The CDC advises that people should not travel to areas where there are COVID-19 outbreaks right now for any reason that is not urgent. For the most current CDC travel advisories, visit the CDC website at www.cdc.gov/coronavirus/2019-ncov/travelers. Dont go on cruises or do non-essential travel right now.      When you are at home  Wash your hands often. Use soap and clean, running water for at least 20 seconds.   If you don't have access to soap and water, use an alcohol-based hand  often. Make sure it has at least 60% alcohol.   Don't touch your eyes, nose, or mouth unless you have clean hands.   Dont kiss someone who is sick.   If you need to cough or sneeze, do it into a tissue. Then throw the tissue into the trash. If you don't have tissues, cough or sneeze into the bend of your elbow.   When possible, don't touch "high-touch" shared surfaces such as doorknobs and handles, cabinet handles, and light switches.   Clean frequently-touched home surfaces often with disinfectant. This includes desk surfaces, printers, phones, kitchen counters, tables, fridge door handle, bathroom surfaces, and any soiled surface. Closely follow disinfectant label " "instructions. Go to the CDCs detailed cleaning website at www.cdc.gov/coronavirus/2019-ncov/prepare/cleaning-disinfection.html.   Check your home supplies. Consider keeping a 2-week supply of medicines, food, and other needed household items.   Make a plan for childcare, work, and ways to stay in touch with others. Know who will help you if you get sick.   Don't be around people who are sick.   There is no evidence right now that animals spread SARS-CoV-2. But it's always a good idea to wash your hands after touching any animals. Don't touch animals that may be sick.   Dont share eating or drinking utensils with sick people.     If you leave home       Stay at least 6 feet away from all people. This is called "social distancing."   When possible, don't touch "high-touch" public surfaces such as doorknobs and handles, cabinet handles, and light switches. If you touch these surfaces, try to clean them first with a disinfecting wipe. Or touch them using a tissue or paper towel.   Use an alcohol-based hand  often. Make sure it has at least 60% alcohol.   Don't touch your eyes, nose, or mouth unless you have clean hands.   If you need to cough or sneeze, do it into a tissue. Then throw the tissue into the trash. If you don't have tissues, cough or sneeze into the bend of your elbow.   Don't attend public gatherings if possible. If you do attend public gatherings, follow social distancing rules. Don't share food or personal items such as water bottles.   The CDC advises wearing a cloth face mask with two or more layers of washable, breathable fabric while in public or when indoors with people who don't live with you. Or you can wear a disposable paper mask with a cloth mask over it. You can make a cloth face mask of your own. . The CDC has instructions on how to make a mask. Wear the mask so that it covers both your nose and mouth.   The CDC advises all people over age 2 to wear cloth face masks in public " "settings when around people outside of their household, especially when it's hard to socially distance. For example, wear a mask in populated places such as public transit, public protests and marches, and crowded stores, bars, and restaurants. Cloth masks may help prevent people who have COVID-19 form spreading the virus to others. Cloth masks are most likely to reduce COVID-19 spread when masks are widely used by people who are out in the public.   Certain people should not wear a face mask. This includes:   Children younger than 2 years old   Anyone with a health, developmental, or mental health condition that can be made worse by wearing a mask   Anyone who is unconscious or unable to remove the face covering without help. See the CDC's guidance on who should not wear a face mask.     If you are at a work site  If you haven't been fully vaccinated against COVID-19 and are exposed , go home and stay home if you feel sick in any way.   Tell your supervisor if you are well but live with someone who has COVID-19.   Stay at least 6 feet away from all people.   Don't shake hands with anyone.   Don't attend in-person meetings, or limit how many you attend. Meet over phone or video if possible.   Don't use other people's desks, phones, equipment, or offices, if possible.   Wash your hands often. Use soap and clean, running water for at least 20 seconds.   If you don't have access to soap and water, use an alcohol-based hand  often. Make sure it has at least 60% alcohol.   Don't touch your eyes, nose, or mouth unless you have clean hands.   The CDC advises wearing a cloth face mask with two or more layers of washable, breathable fabric while in public or when indoors with people who don't live with you. You can make a cloth face mask of your own. . The CDC has instructions on how to make a mask. Wear the mask so that it covers both your nose and mouth. .   When possible, don't touch "high-touch" public surfaces " such as doorknobs and handles, cabinet handles, and light switches. If you touch these surfaces, clean them first with a disinfecting wipe. Or touch them using a tissue or paper towel.   Use office sangeetha one person at a time.   Consider not having office coffee or tea, or group foods.   Dont have meals in groups.   Clean work surfaces often with disinfectant. This includes desk surfaces, photocopier, printer, phones, kitchen counters, fridge door handle, bathroom surfaces, and others.   Dont touch other peoples personal work tools, such as phones, keyboards, pens, and other items.   Dont touch other peoples eating or drinking utensils.   If you need to cough or sneeze, do it into a tissue. Then throw the tissue into the trash. If you don't have tissues, cough or sneeze into the bend of your elbow.     If you have been exposed to a person with COVID-19   If you've been fully vaccinated against COVID-19, you don't need to stay home away from others if you've been exposed.   If you haven't been fully vaccinated and d have been exposed to someone who is suspected of having COVID-19 or has tested positive for it:   Call your healthcare provider and follow all instructions. Stay home away from others and monitor your health. This is called quarantine. The CDC advises that you quarantine to help prevent the spread of COVID-19 once you've been exposed to someone with the infection. The CDC still supports quarantine for 14 days after exposure, but they recognize the hardship for many who need to work. The CDC now recommends two options for how long quarantine should last. If you have been exposed but don't have symptoms, you can stop quarantine:   10 days after exposure if you don't get a diagnostic (viral) test, or   7 days after getting a negative viral test result.   Take your temperature every morning and evening for 14 days after exposure. This is to check for fever. Keep a record of the readings. If possible,  stay away from others, especially those who are at higher risk of getting very sick from COVID-19.   Watch for symptoms of the virus such as cough or trouble breathing. If you develop any symptoms, isolate yourself right away. Call your provider first before going to any clinic or hospital. See the [facility]'s symptom .   Your limits are different if you've had COVID-19 in the last 3 months but are fully recovered without symptoms and you have been exposed to someone with COVID-19. If you are symptom-free, you don't need to quarantine or be retested. The CDC doesn't recommend retesting unless you have symptoms of COVID-19 and your new symptoms can't be linked to another illness. Contact your healthcare provider if you have any questions. If you develop symptoms, stay home. If you had COVID-19 more than 3 months ago and have been exposed again, treat it like you've never had COVID-19 and stay home, limit your contact with others, call your provider, and monitor for symptoms.     When to call your healthcare provider  Call your healthcare provider if you think you have COVID-19 symptoms. These can include fever, cough, and trouble breathing. They may also include body aches, headache, chills or repeated shaking with chills, sore throat, loss of taste or smell, or diarrhea. Dont go to a healthcare facility before speaking to a healthcare provider. Using an Inhaler with a Spacer  To control asthma, you need to use your medicines the right way. Some medicines are inhaled using a device called a metered-dose inhaler (MDI). Metered-dose inhalers deliver medicine with a fine spray. You may be asked to use a spacer (holding tube) with your inhaler. The spacer helps make sure all the medicine you need goes into your lungs.   Steps for using an inhaler with a spacer  Step 1:   Remove the caps from the inhaler and spacer.   Shake the inhaler well and attach the spacer. If the inhaler is being used for the first time or has  not been used for a while, prime it as directed by the product maker.  Step 2:   Breathe out normally.   Put the spacer between your teeth. Close your lips tightly around it.   Keep your chin up.  Step 3:   Spray 1 puff into the spacer by pressing down on the inhaler.   Then breathe in through your mouth as slowly and deeply as you can. This should take about 5-10 seconds. If you breathe too quickly, you may hear a whistling sound in certain spacers.  Step 4:   Take the spacer out of your mouth.   Hold your breath for a count of 10.  Then hold your lips together and slowly breathe out through your mouthACTION PLAN    GREEN ZONE   My sputum is clear/white/usual color and easily cleared.   My breathing is no harder than usual.   I can do my usual activities.   I can think clearly.   Take your usual medicines, including oxygen, as you are told to do so by your health care provider.   YELLOW ZONE   My sputum has change (color, thickness, amount).   I am more short of breath than usual.   I cough or wheeze more.   I weigh more and my legs/feet swell.   I cannot do my usual activities without resting.   Call your health care provider. You will probably be told to begin taking an antibiotic and prednisone. Have your pharmacy phone number available.   RED ZONE   I cannot cough out my sputum.   I am much more short of breath than normal.   I need to sit up to breathe   I cannot do my usual activities.   I am unable to speak more than one or two words at a time.   I am confused.   Call your health care provider. You may be asked to come in to be seen, told to go to the emergency room, or told to call [id number].

## 2022-01-05 NOTE — PROGRESS NOTES
HPI     Diabetic Eye Exam      Additional comments: Yearly              Comments     Diabetic eye exam  Diagnosed with diabetes in 2000  Recent vision fluctuations yes  Blood sugar: 124    Any vision changes since last exam: No, vision stable   Eye pain: No   Other ocular symptoms: Dry Eyes    Do you wear currently wear glasses or contacts? Glasses    Interested in contacts today? No    Do you plan on getting new glasses today? Yes          Last edited by Deborah Pinto on 5/22/2019  9:41 AM. (History)            Assessment /Plan     For exam results, see Encounter Report.    Type 2 diabetes mellitus without retinopathy  No diabetic retinopathy OD, OS  Continue close care with PCP  Monitor 12 months    Dry eye  No improvement with AT  Pt reports good results when on Restasis in the past  Resume Restasis bid OU    Myopia with presbyopia, bilateral  Eyeglass Final Rx     Eyeglass Final Rx       Sphere Cylinder Axis Add    Right -2.25   +2.00    Left -3.00 +0.50 155 +2.00    Type:  PAL    Expiration Date:  5/22/2020                Other orders  -     cyclosporine (RESTASIS MULTIDOSE) 0.05 % Drop; Place 1 drop into both eyes 2 (two) times daily.  Dispense: 5.5 mL; Refill: 11      RTC 1 yr for dilated eye exam or PRN if any problems.   Discussed above and answered questions.                   
There are no Wet Read(s) to document.

## 2022-01-10 DIAGNOSIS — M79.18 MYOFASCIAL MUSCLE PAIN: ICD-10-CM

## 2022-01-10 DIAGNOSIS — Z79.4 TYPE 2 DIABETES MELLITUS WITH OTHER SPECIFIED COMPLICATION, WITH LONG-TERM CURRENT USE OF INSULIN: ICD-10-CM

## 2022-01-10 DIAGNOSIS — E11.69 TYPE 2 DIABETES MELLITUS WITH OTHER SPECIFIED COMPLICATION, WITH LONG-TERM CURRENT USE OF INSULIN: ICD-10-CM

## 2022-01-10 RX ORDER — ORPHENADRINE CITRATE 100 MG/1
100 TABLET, EXTENDED RELEASE ORAL 2 TIMES DAILY PRN
Qty: 60 TABLET | Refills: 0 | Status: SHIPPED | OUTPATIENT
Start: 2022-01-10 | End: 2022-01-20

## 2022-01-14 ENCOUNTER — PATIENT MESSAGE (OUTPATIENT)
Dept: PSYCHIATRY | Facility: CLINIC | Age: 52
End: 2022-01-14
Payer: MEDICAID

## 2022-01-14 ENCOUNTER — OFFICE VISIT (OUTPATIENT)
Dept: PSYCHIATRY | Facility: CLINIC | Age: 52
End: 2022-01-14
Payer: MEDICAID

## 2022-01-14 DIAGNOSIS — F41.1 GENERALIZED ANXIETY DISORDER: ICD-10-CM

## 2022-01-14 DIAGNOSIS — F31.9 BIPOLAR I DISORDER, CURRENT EPISODE DEPRESSED: Primary | ICD-10-CM

## 2022-01-14 DIAGNOSIS — F43.21 GRIEF: ICD-10-CM

## 2022-01-14 DIAGNOSIS — F41.1 GENERALIZED ANXIETY DISORDER: Chronic | ICD-10-CM

## 2022-01-14 PROCEDURE — 90834 PSYTX W PT 45 MINUTES: CPT | Mod: AJ,HB,, | Performed by: SOCIAL WORKER

## 2022-01-14 PROCEDURE — 3072F PR LOW RISK FOR RETINOPATHY: ICD-10-PCS | Mod: AJ,HB,CPTII, | Performed by: SOCIAL WORKER

## 2022-01-14 PROCEDURE — 3072F LOW RISK FOR RETINOPATHY: CPT | Mod: AJ,HB,CPTII, | Performed by: SOCIAL WORKER

## 2022-01-14 PROCEDURE — 90834 PR PSYCHOTHERAPY W/PATIENT, 45 MIN: ICD-10-PCS | Mod: AJ,HB,, | Performed by: SOCIAL WORKER

## 2022-01-14 RX ORDER — DULOXETIN HYDROCHLORIDE 60 MG/1
60 CAPSULE, DELAYED RELEASE ORAL NIGHTLY
Qty: 30 CAPSULE | Refills: 1 | Status: SHIPPED | OUTPATIENT
Start: 2022-01-14 | End: 2022-02-23 | Stop reason: SDUPTHER

## 2022-01-14 RX ORDER — DULOXETIN HYDROCHLORIDE 30 MG/1
30 CAPSULE, DELAYED RELEASE ORAL EVERY MORNING
Qty: 30 CAPSULE | Refills: 1 | Status: SHIPPED | OUTPATIENT
Start: 2022-01-14 | End: 2022-02-23

## 2022-01-14 NOTE — PROGRESS NOTES
Individual Psychotherapy (PhD/LCSW)    1/14/2022    Site:  [place]         Therapeutic Intervention: Met with patient.  Outpatient - Behavior modifying psychotherapy 45 min - CPT code 08800    Chief complaint/reason for encounter: depression     Interval history and content of current session: Pt scheduled appointment today stating she was feeling very depressed.  She denied any suicidal ideation at this time.  Pt reports that downward spiral began with the death of her sister.  Education provided on complicated grief.  Therapist encouraging patient to isolate less and let her family see her vulnerability.  Only boyfriend aware of the depth of her depression because she has been staying with him.  Should be moving into her own home at the beginning of February.  Family has long history of being hurt and holding onto anger and grudges.  Encouraged her to mend hurts with her remaining sister and kids in order to stop this painful cycle in her family.  Changing narrative of her family and also honor sister who is gone by changing those patterns.      Treatment plan:  · Target symptoms: depression, grief  · Why chosen therapy is appropriate versus another modality: relevant to diagnosis, evidence based practice  · Outcome monitoring methods: self-report, observation  · Therapeutic intervention type: behavior modifying psychotherapy, supportive psychotherapy    Risk parameters:  Patient reports no suicidal ideation  Patient reports no homicidal ideation  Patient reports no self-injurious behavior  Patient reports no violent behavior    Verbal deficits: None    Patient's response to intervention:  The patient's response to intervention is reluctant.    Progress toward goals and other mental status changes:  The patient's progress toward goals is not progressing.    Diagnosis:     ICD-10-CM ICD-9-CM   1. Bipolar I disorder, current episode depressed  F31.9 296.50   2. Grief  F43.21 309.0   3. Generalized anxiety  disorder  F41.1 300.02       Plan:  individual psychotherapy    Return to clinic: 2 weeks    Length of Service (minutes): 45     Liv Stout   01/14/2022   1:55 PM

## 2022-01-18 DIAGNOSIS — E83.42 HYPOMAGNESEMIA: ICD-10-CM

## 2022-01-18 DIAGNOSIS — E11.69 TYPE 2 DIABETES MELLITUS WITH OTHER SPECIFIED COMPLICATION, WITH LONG-TERM CURRENT USE OF INSULIN: ICD-10-CM

## 2022-01-18 DIAGNOSIS — Z79.4 TYPE 2 DIABETES MELLITUS WITH OTHER SPECIFIED COMPLICATION, WITH LONG-TERM CURRENT USE OF INSULIN: ICD-10-CM

## 2022-01-18 RX ORDER — LANOLIN ALCOHOL/MO/W.PET/CERES
400 CREAM (GRAM) TOPICAL DAILY
Qty: 90 TABLET | Refills: 3 | Status: SHIPPED | OUTPATIENT
Start: 2022-01-18 | End: 2022-08-26 | Stop reason: SDUPTHER

## 2022-01-18 RX ORDER — BLOOD-GLUCOSE CONTROL, NORMAL
EACH MISCELLANEOUS
Qty: 100 EACH | Refills: 11 | Status: SHIPPED | OUTPATIENT
Start: 2022-01-18 | End: 2022-08-26 | Stop reason: SDUPTHER

## 2022-01-19 ENCOUNTER — TELEPHONE (OUTPATIENT)
Dept: PAIN MEDICINE | Facility: CLINIC | Age: 52
End: 2022-01-19
Payer: MEDICAID

## 2022-01-19 ENCOUNTER — TELEPHONE (OUTPATIENT)
Dept: PRIMARY CARE CLINIC | Facility: CLINIC | Age: 52
End: 2022-01-19
Payer: MEDICAID

## 2022-01-19 ENCOUNTER — PATIENT MESSAGE (OUTPATIENT)
Dept: PRIMARY CARE CLINIC | Facility: CLINIC | Age: 52
End: 2022-01-19
Payer: MEDICAID

## 2022-01-19 NOTE — TELEPHONE ENCOUNTER
Called patient and scheduled her for earliest appointment available, placed on waitlist, and advised if migraines are unbearable to report to urgent care and get a work excuse as requested. She voiced understanding.      ----- Message from Bette Hubbard sent at 1/19/2022  7:32 AM CST -----  Contact: PILO YANCEY [[id number]]  .Type:  Sooner Apoointment Request    Caller is requesting a sooner appointment.  Caller declined first available appointment listed below.  Caller will not accept being placed on the waitlist and is requesting a message be sent to doctor.  Name of Caller:PILO YANCEY [[id number]]  When is the first available appointment? 03/2022  Symptoms:Migraines   Would the patient rather a call back or a response via [facility]? call  Best Call Back Number:.[phone] (home)    Additional Information: Pt states that she needs to come in sooner offered appointment with Dr Baez on 01/26/2022 pt declined

## 2022-01-19 NOTE — TELEPHONE ENCOUNTER
----- Message from Bette Hubbard sent at 1/19/2022  7:43 AM CST -----  Contact: PILO YANCEY [[id number]]  .Type:  Sooner Apoointment Request    Caller is requesting a sooner appointment.  Caller declined first available appointment listed below.  Caller will not accept being placed on the waitlist and is requesting a message be sent to doctor.  Name of Caller:PILO YANCEY [[id number]]  When is the first available appointment? N/A  Symptoms: Pain in shoulder   Would the patient rather a call back or a response via [facility]? call  Best Call Back Number:.[phone] (home)    Additional Information:

## 2022-01-20 ENCOUNTER — TELEPHONE (OUTPATIENT)
Dept: RHEUMATOLOGY | Facility: CLINIC | Age: 52
End: 2022-01-20
Payer: MEDICAID

## 2022-01-20 ENCOUNTER — TELEPHONE (OUTPATIENT)
Dept: PAIN MEDICINE | Facility: CLINIC | Age: 52
End: 2022-01-20
Payer: MEDICAID

## 2022-01-20 DIAGNOSIS — M79.18 MYOFASCIAL MUSCLE PAIN: Primary | ICD-10-CM

## 2022-01-20 RX ORDER — CYCLOBENZAPRINE HCL 10 MG
5-10 TABLET ORAL 2 TIMES DAILY PRN
Qty: 90 TABLET | Refills: 0 | Status: SHIPPED | OUTPATIENT
Start: 2022-01-20 | End: 2022-03-09 | Stop reason: SDUPTHER

## 2022-01-20 NOTE — TELEPHONE ENCOUNTER
Insurance isn't covering Norflex muscle relaxer, Pt states she has taken Flexeril in the past and found that helpful. Please advise.

## 2022-01-20 NOTE — TELEPHONE ENCOUNTER
----- Message from Josselyn Stringer sent at 1/20/2022  2:31 PM CST -----  Regarding: shoulder pain  Contact: patient  Patient states this is second message regarding her shoulder pain, please call her back at [phone]

## 2022-01-20 NOTE — TELEPHONE ENCOUNTER
----- Message from Fallon Zacarias PA-C sent at 1/20/2022 11:14 AM CST -----  Is it orphenadrine-norflex? Looks like Dr. Hay sent on 1/10/22.  ----- Message -----  From: Lanie Siegel  Sent: 1/20/2022  11:07 AM CST  To: Rell APARICIO Staff    Pt is calling in regards to having pain in her left shoulder. Pt stated that she would like muscle relaxers called in for her.  Please call [phone] (home)       [facility] [address] [address]  Phone: [phone] Fax: [phone]

## 2022-01-20 NOTE — TELEPHONE ENCOUNTER
----- Message from Lanie Siegel sent at 1/20/2022 11:01 AM CST -----  Pt is calling in regards to getting an apt for right shoulder pain. Please call [phone] (home)

## 2022-01-20 NOTE — TELEPHONE ENCOUNTER
"Spoke to the patient and she stated that she has "fluid on her shoulder" and may request and injections for pain.  Also added patient to wait list  "

## 2022-01-28 ENCOUNTER — OFFICE VISIT (OUTPATIENT)
Dept: PSYCHIATRY | Facility: CLINIC | Age: 52
End: 2022-01-28
Payer: MEDICAID

## 2022-01-28 ENCOUNTER — PATIENT MESSAGE (OUTPATIENT)
Dept: DIABETES | Facility: CLINIC | Age: 52
End: 2022-01-28
Payer: MEDICAID

## 2022-01-28 ENCOUNTER — NURSE TRIAGE (OUTPATIENT)
Dept: ADMINISTRATIVE | Facility: CLINIC | Age: 52
End: 2022-01-28
Payer: MEDICAID

## 2022-01-28 ENCOUNTER — TELEPHONE (OUTPATIENT)
Dept: DIABETES | Facility: CLINIC | Age: 52
End: 2022-01-28
Payer: MEDICAID

## 2022-01-28 DIAGNOSIS — F31.9 BIPOLAR I DISORDER, CURRENT EPISODE DEPRESSED: Primary | ICD-10-CM

## 2022-01-28 DIAGNOSIS — F43.21 GRIEF: ICD-10-CM

## 2022-01-28 DIAGNOSIS — F41.1 GENERALIZED ANXIETY DISORDER: ICD-10-CM

## 2022-01-28 DIAGNOSIS — Z79.4 TYPE 2 DIABETES MELLITUS WITH HYPERGLYCEMIA, WITH LONG-TERM CURRENT USE OF INSULIN: Chronic | ICD-10-CM

## 2022-01-28 DIAGNOSIS — E11.65 TYPE 2 DIABETES MELLITUS WITH HYPERGLYCEMIA, WITH LONG-TERM CURRENT USE OF INSULIN: Chronic | ICD-10-CM

## 2022-01-28 PROCEDURE — 3072F LOW RISK FOR RETINOPATHY: CPT | Mod: AJ,HB,CPTII, | Performed by: SOCIAL WORKER

## 2022-01-28 PROCEDURE — 90834 PR PSYCHOTHERAPY W/PATIENT, 45 MIN: ICD-10-PCS | Mod: AJ,HB,, | Performed by: SOCIAL WORKER

## 2022-01-28 PROCEDURE — 90834 PSYTX W PT 45 MINUTES: CPT | Mod: AJ,HB,, | Performed by: SOCIAL WORKER

## 2022-01-28 PROCEDURE — 3072F PR LOW RISK FOR RETINOPATHY: ICD-10-PCS | Mod: AJ,HB,CPTII, | Performed by: SOCIAL WORKER

## 2022-01-28 RX ORDER — INSULIN GLARGINE 300 U/ML
85 INJECTION, SOLUTION SUBCUTANEOUS NIGHTLY
Qty: 4 PEN | Refills: 5 | Status: SHIPPED | OUTPATIENT
Start: 2022-01-28 | End: 2022-02-02 | Stop reason: SDUPTHER

## 2022-01-28 RX ORDER — INSULIN ASPART 100 [IU]/ML
INJECTION, SOLUTION INTRAVENOUS; SUBCUTANEOUS
Qty: 15 ML | Refills: 3 | Status: SHIPPED | OUTPATIENT
Start: 2022-01-28 | End: 2022-02-02 | Stop reason: SDUPTHER

## 2022-01-28 NOTE — TELEPHONE ENCOUNTER
BG today 221 45 min after a meal  Pt reports not taking BG regularly, can not give accurate/recent recall of fasting and PP  Nausea, sweating, abd pain witheating. Reports constipation- this is not new.   Plan below   - Go to ER now for pancreatitis concern, also had bariatric surgery     Diabetes Medications             exenatide microspheres (BYDUREON BCISE) 2 mg/0.85 mL AtIn Inject 2 mg into the skin every 7 days. Discontinue     insulin aspart U-100 (NOVOLOG) 100 unit/mL injection Novolog Take No date recorded No form recorded No frequency recorded No route recorded No set duration recorded No set duration amount recorded active No dosage strength recorded No dosage strength units of measure recorded Restart per ss    insulin glargine U-300 conc (TOUJEO MAX U-300 SOLOSTAR) 300 unit/mL (3 mL) insulin pen Inject 80 Units into the skin every evening. Taking 85 units    metFORMIN (GLUCOPHAGE) 1000 MG tablet Take 1 tablet (1,000 mg total) by mouth 2 (two) times daily with meals. Continue          If blood pre-meal sugar is 151-200 take +2 units of Novolog;  If blood pre-meal sugar is 201-250 take +4 units of Novolog;  If blood pre-meal sugar is 251-300 take +6 units of Novolog;  If blood pre-meal sugar is 301-350 take +8 units of Novolog;  If blood pre-meal sugar is 351-400+ take +10 units of Novolog;

## 2022-01-28 NOTE — PROGRESS NOTES
"Individual Psychotherapy (PhD/LCSW)    1/28/2022    Site:  [facility]         Therapeutic Intervention: Met with patient.  Outpatient - Insight oriented psychotherapy 45 min - CPT code 40176    Chief complaint/reason for encounter: depression and grief     Interval history and content of current session: Pt shared that her niece is in ICU as she has begun having seizures and they are unable to stop them now.  [place] about admission from daughter as her sister did not feel inclined to let her know.  Pt still so saddened by the loss of her sister and what she perceives as an unfixable relationship with her surviving sister.  Pt shared more about the dynamic of her current relationship which better explains her lack of satisfaction in it.  Unable to find a home that she can afford in her price range but does not want to return to apartment living either.  She has been with this man 10 years but he is still legally  to his wife although they have lived apart for 20 years.  He still pays all of her bills and meets a lot of her needs.  He also has a 21 and  12 year old from a relationships before patient and he has custody of those boys.  The younger child's mom abandoned him about 2 years ago.  Pt feels like the "mistrese" as his existence is kept secret from the wife (although all the children know about her).  Hates that she can't be part of his whole life as he still participates in a charade with estranged wife.      Treatment plan:  · Target symptoms: depression, grief  · Why chosen therapy is appropriate versus another modality: relevant to diagnosis, patient responds to this modality  · Outcome monitoring methods: self-report, observation  · Therapeutic intervention type: insight oriented psychotherapy, supportive psychotherapy    Risk parameters:  Patient reports no suicidal ideation  Patient reports no homicidal ideation  Patient reports no self-injurious behavior  Patient reports no violent " behavior    Verbal deficits: None    Patient's response to intervention:  The patient's response to intervention is reluctant.    Progress toward goals and other mental status changes:  The patient's progress toward goals is limited.    Diagnosis:     ICD-10-CM ICD-9-CM   1. Bipolar I disorder, current episode depressed  F31.9 296.50   2. Grief  F43.21 309.0   3. Generalized anxiety disorder  F41.1 300.02       Plan:  individual psychotherapy    Return to clinic: 1 week    Length of Service (minutes): 45     Liv Stout   01/28/2022   9:10 AM

## 2022-01-28 NOTE — TELEPHONE ENCOUNTER
Yolanda calling states feeling bad, nausea dizziness and weakness after eating BS in 221. Advised pt per triage protocol to call  now. Verbalized understanding.     Reason for Disposition   Shock suspected (e.g., cold/pale/clammy skin, too weak to stand, low BP, rapid pulse)    Additional Information   Negative: SEVERE difficulty breathing (e.g., struggling for each breath, speaks in single words)    Protocols used: DIZZINESS-A-OH

## 2022-02-02 ENCOUNTER — PATIENT MESSAGE (OUTPATIENT)
Dept: PEDIATRICS | Facility: CLINIC | Age: 52
End: 2022-02-02
Payer: MEDICAID

## 2022-02-02 ENCOUNTER — TELEPHONE (OUTPATIENT)
Dept: PHARMACY | Facility: CLINIC | Age: 52
End: 2022-02-02
Payer: MEDICAID

## 2022-02-02 ENCOUNTER — OFFICE VISIT (OUTPATIENT)
Dept: DIABETES | Facility: CLINIC | Age: 52
End: 2022-02-02
Payer: MEDICAID

## 2022-02-02 DIAGNOSIS — E11.65 TYPE 2 DIABETES MELLITUS WITH HYPERGLYCEMIA, WITH LONG-TERM CURRENT USE OF INSULIN: Primary | ICD-10-CM

## 2022-02-02 DIAGNOSIS — Z79.4 TYPE 2 DIABETES MELLITUS WITH HYPERGLYCEMIA, WITH LONG-TERM CURRENT USE OF INSULIN: Primary | ICD-10-CM

## 2022-02-02 PROCEDURE — 3072F PR LOW RISK FOR RETINOPATHY: ICD-10-PCS | Mod: CPTII,95,, | Performed by: NURSE PRACTITIONER

## 2022-02-02 PROCEDURE — 1160F RVW MEDS BY RX/DR IN RCRD: CPT | Mod: CPTII,95,, | Performed by: NURSE PRACTITIONER

## 2022-02-02 PROCEDURE — 99214 PR OFFICE/OUTPT VISIT, EST, LEVL IV, 30-39 MIN: ICD-10-PCS | Mod: 95,,, | Performed by: NURSE PRACTITIONER

## 2022-02-02 PROCEDURE — 1159F PR MEDICATION LIST DOCUMENTED IN MEDICAL RECORD: ICD-10-PCS | Mod: CPTII,95,, | Performed by: NURSE PRACTITIONER

## 2022-02-02 PROCEDURE — 99214 OFFICE O/P EST MOD 30 MIN: CPT | Mod: 95,,, | Performed by: NURSE PRACTITIONER

## 2022-02-02 PROCEDURE — 3072F LOW RISK FOR RETINOPATHY: CPT | Mod: CPTII,95,, | Performed by: NURSE PRACTITIONER

## 2022-02-02 PROCEDURE — 1159F MED LIST DOCD IN RCRD: CPT | Mod: CPTII,95,, | Performed by: NURSE PRACTITIONER

## 2022-02-02 PROCEDURE — 1160F PR REVIEW ALL MEDS BY PRESCRIBER/CLIN PHARMACIST DOCUMENTED: ICD-10-PCS | Mod: CPTII,95,, | Performed by: NURSE PRACTITIONER

## 2022-02-02 RX ORDER — INSULIN ASPART 100 [IU]/ML
INJECTION, SOLUTION INTRAVENOUS; SUBCUTANEOUS
Qty: 15 ML | Refills: 3 | Status: SHIPPED | OUTPATIENT
Start: 2022-02-02 | End: 2022-02-03

## 2022-02-02 RX ORDER — INSULIN GLARGINE 300 U/ML
90 INJECTION, SOLUTION SUBCUTANEOUS NIGHTLY
Qty: 4 PEN | Refills: 3 | Status: SHIPPED | OUTPATIENT
Start: 2022-02-02 | End: 2022-06-12

## 2022-02-02 NOTE — PROGRESS NOTES
Subjective:         Patient ID: Yolanda Betts is a 51 y.o. female.  Patient's current PCP is Sasha Sorenson MD.       Chief Complaint: No chief complaint on file.    HPI  Yolanda Betts is a 51 y.o. Black or  female presenting for a follow up for diabetes. Patient has been diagnosed with diabetes for > 10 years.Bariatric surgery 6/1/21. Last week pt complained of GI upset, evaluated in the ER, CT was negative, advised to follow up with her surgeon     Complications related to diabetes:  HTN, Hyperlipidemia, peripheral neuropathy; denies Pancreatitis; denies Gastroparesis; denies DKA; denies Hx/family Hx of MEN2/MTC; reports Frequent UTIs/yeast infections    Past failed treatment include:  Jardiance- multiple yeast infections; Victoza - GI upset    Blood glucose testing is performed regularly, reports -120, can increase > 180 PP, no hypoglycemia, CGM - No, does not qualify    Compliance:The patient reports medication and diet compliance most of the time. Previously worsened, but now improving       The patient location is: home  The chief complaint leading to consultation is: DM follow up    Visit type: audiovisual    Face to Face time with patient: 20 minutes of total time spent on the encounter, which includes face to face time and non-face to face time preparing to see the patient (eg, review of tests), Obtaining and/or reviewing separately obtained history, Documenting clinical information in the electronic or other health record, Independently interpreting results (not separately reported) and communicating results to the patient/family/caregiver, or Care coordination (not separately reported). Each patient to whom he or she provides medical services by telemedicine is:  (1) informed of the relationship between the physician and patient and the respective role of any other health care provider with respect to management of the patient; and (2) notified that he or she may decline  to receive medical services by telemedicine and may withdraw from such care at any time.         //   , There is no height or weight on file to calculate BMI.  Her blood sugar in clinic today is:   Lab Results   Component Value Date    POCGLU 269 (A) 11/11/2019       Labs reviewed and are noted below.  Her most recent A1C is:  Lab Results   Component Value Date    HGBA1C 6.5 (H) 07/22/2021    HGBA1C 7.1 (H) 05/20/2021    HGBA1C 6.2 (H) 03/24/2021     Lab Results   Component Value Date    CPEPTIDE 4.56 02/26/2018     Lab Results   Component Value Date    GLUTAMICACID 0.00 02/26/2018     Glucose   Date Value Ref Range Status   06/08/2021 104 70 - 110 mg/dL Final     Anion Gap   Date Value Ref Range Status   06/08/2021 13 8 - 16 mmol/L Final     eGFR if    Date Value Ref Range Status   06/08/2021 >60.0 >60 mL/min/1.73 m^2 Final     eGFR if non    Date Value Ref Range Status   06/08/2021 >60.0 >60 mL/min/1.73 m^2 Final     Comment:     Calculation used to obtain the estimated glomerular filtration  rate (eGFR) is the CKD-EPI equation.          CURRENT DM MEDICATIONS:     Diabetes Medications             insulin aspart U-100 (NOVOLOG) 100 unit/mL injection Inject up to 10 units 3 times a day before meals based on sliding scale Not started due to insurance issues    insulin glargine U-300 conc (TOUJEO MAX U-300 SOLOSTAR) 300 unit/mL (3 mL) insulin pen Inject 86 Units into the skin every evening.    metFORMIN (GLUCOPHAGE) 1000 MG tablet Take 1 tablet (1,000 mg total) by mouth 2 (two) times daily with meals.          Diabetes Management Status    Statin: Taking  ACE/ARB: Taking    Screening or Prevention Patient's value Goal Complete/Controlled?   HgA1C Testing and Control   Lab Results   Component Value Date    HGBA1C 6.5 (H) 07/22/2021      Annually/Less than 8% Yes   Lipid profile : 07/22/2021 Annually Yes   LDL control Lab Results   Component Value Date    LDLCALC 96.8 07/22/2021     Annually/Less than 100 mg/dl  Yes   Nephropathy screening Lab Results   Component Value Date    LABMICR 48.0 07/07/2020     Lab Results   Component Value Date    PROTEINUA Negative 07/20/2020    Annually Yes   Blood pressure BP Readings from Last 1 Encounters:   09/15/21 114/70    Less than 140/90 Yes   Dilated retinal exam : 10/20/2021 Annually Yes   Foot exam   : 01/14/2021 Annually Yes     Review of Systems   Constitutional: Negative for activity change and appetite change.   Gastrointestinal: Positive for abdominal pain (occassional ). Negative for constipation.        Hx of IBS   Endocrine: Negative for polydipsia, polyphagia and polyuria.   Musculoskeletal: Positive for arthralgias.   Neurological: Negative for syncope and weakness.   Psychiatric/Behavioral: Negative for confusion.         Objective:      Physical Exam  Constitutional:       General: She is not in acute distress.     Appearance: She is not ill-appearing.   Neurological:      Mental Status: She is alert.   Psychiatric:         Speech: Speech normal.         Assessment:       1. Type 2 diabetes mellitus with hyperglycemia, with long-term current use of insulin        Plan:   Type 2 diabetes mellitus with hyperglycemia, with long-term current use of insulin         PLAN:   - Condition: uncontrolled    - Monitor blood glucose 4x daily.Goals reviewed  - She is working with the RD  - BP and LDL- Recommended lifestyle modifications for management. Encouraged healthy low fat, low carb diet and increase physical activity  - Medication Changes: Increase Toujeo to 90 units, Continue Metformin, Start Novolog per ss below, hold GLP-1 until evaluated by surgeon   - The patient was explained the above plan and given opportunity to ask questions.  She understands, chooses and consents to this plan and accepts all the risks, which include but are not limited to the risks mentioned above.   - Labs ordered as above  - Nurse visit: deferred   - Follow up: 1 month        If blood pre-meal sugar is 151-200 take +2 units of Novolog;  If blood pre-meal sugar is 201-250 take +4 units of Novolog;  If blood pre-meal sugar is 251-300 take +6 units of Novolog;  If blood pre-meal sugar is 301-350 take +8 units of Novolog;  If blood pre-meal sugar is 351-400+ take +10 units of Novolog;      A total of 20 minutes was spent in face to face time, of which over 50% was spent in counseling patient on disease process, complications, treatment, and side effects of medications.

## 2022-02-03 DIAGNOSIS — M25.511 CHRONIC PAIN OF BOTH SHOULDERS: ICD-10-CM

## 2022-02-03 DIAGNOSIS — M47.816 LUMBAR SPONDYLOSIS: ICD-10-CM

## 2022-02-03 DIAGNOSIS — I15.2 HYPERTENSION COMPLICATING DIABETES: Chronic | ICD-10-CM

## 2022-02-03 DIAGNOSIS — G89.29 CHRONIC PAIN OF BOTH SHOULDERS: ICD-10-CM

## 2022-02-03 DIAGNOSIS — Z79.4 TYPE 2 DIABETES MELLITUS WITH HYPERGLYCEMIA, WITH LONG-TERM CURRENT USE OF INSULIN: Primary | ICD-10-CM

## 2022-02-03 DIAGNOSIS — M25.512 CHRONIC PAIN OF BOTH SHOULDERS: ICD-10-CM

## 2022-02-03 DIAGNOSIS — E11.65 TYPE 2 DIABETES MELLITUS WITH HYPERGLYCEMIA, WITH LONG-TERM CURRENT USE OF INSULIN: Primary | ICD-10-CM

## 2022-02-03 DIAGNOSIS — K59.00 CONSTIPATION, UNSPECIFIED CONSTIPATION TYPE: ICD-10-CM

## 2022-02-03 DIAGNOSIS — E11.59 HYPERTENSION COMPLICATING DIABETES: Chronic | ICD-10-CM

## 2022-02-03 DIAGNOSIS — F31.9 BIPOLAR I DISORDER, CURRENT EPISODE DEPRESSED: ICD-10-CM

## 2022-02-03 RX ORDER — INSULIN ASPART 100 [IU]/ML
10 INJECTION, SOLUTION INTRAVENOUS; SUBCUTANEOUS
Qty: 15 ML | Refills: 3 | Status: SHIPPED | OUTPATIENT
Start: 2022-02-03 | End: 2022-07-08 | Stop reason: SDUPTHER

## 2022-02-03 RX ORDER — AMLODIPINE BESYLATE 5 MG/1
5 TABLET ORAL NIGHTLY
Qty: 30 TABLET | Refills: 11 | Status: SHIPPED | OUTPATIENT
Start: 2022-02-03 | End: 2022-06-17 | Stop reason: SDUPTHER

## 2022-02-04 ENCOUNTER — PATIENT MESSAGE (OUTPATIENT)
Dept: PSYCHIATRY | Facility: CLINIC | Age: 52
End: 2022-02-04
Payer: MEDICAID

## 2022-02-04 ENCOUNTER — OFFICE VISIT (OUTPATIENT)
Dept: PSYCHIATRY | Facility: CLINIC | Age: 52
End: 2022-02-04
Payer: MEDICAID

## 2022-02-04 ENCOUNTER — OFFICE VISIT (OUTPATIENT)
Dept: PRIMARY CARE CLINIC | Facility: CLINIC | Age: 52
End: 2022-02-04
Payer: MEDICAID

## 2022-02-04 ENCOUNTER — PATIENT OUTREACH (OUTPATIENT)
Dept: ADMINISTRATIVE | Facility: OTHER | Age: 52
End: 2022-02-04
Payer: MEDICAID

## 2022-02-04 DIAGNOSIS — F31.9 BIPOLAR I DISORDER, CURRENT EPISODE DEPRESSED: Primary | ICD-10-CM

## 2022-02-04 DIAGNOSIS — F43.21 GRIEF: ICD-10-CM

## 2022-02-04 DIAGNOSIS — E11.69 TYPE 2 DIABETES MELLITUS WITH OTHER SPECIFIED COMPLICATION, WITH LONG-TERM CURRENT USE OF INSULIN: ICD-10-CM

## 2022-02-04 DIAGNOSIS — I10 ESSENTIAL HYPERTENSION: Primary | ICD-10-CM

## 2022-02-04 DIAGNOSIS — Z90.3 H/O GASTRIC SLEEVE: ICD-10-CM

## 2022-02-04 DIAGNOSIS — E78.1 PURE HYPERTRIGLYCERIDEMIA: ICD-10-CM

## 2022-02-04 DIAGNOSIS — F41.1 GENERALIZED ANXIETY DISORDER: ICD-10-CM

## 2022-02-04 DIAGNOSIS — Z79.4 TYPE 2 DIABETES MELLITUS WITH OTHER SPECIFIED COMPLICATION, WITH LONG-TERM CURRENT USE OF INSULIN: ICD-10-CM

## 2022-02-04 DIAGNOSIS — F25.0 SCHIZOAFFECTIVE DISORDER, BIPOLAR TYPE: Chronic | ICD-10-CM

## 2022-02-04 DIAGNOSIS — F31.9 BIPOLAR 1 DISORDER: Chronic | ICD-10-CM

## 2022-02-04 PROCEDURE — 3072F PR LOW RISK FOR RETINOPATHY: ICD-10-PCS | Mod: AJ,HB,CPTII, | Performed by: SOCIAL WORKER

## 2022-02-04 PROCEDURE — 3072F LOW RISK FOR RETINOPATHY: CPT | Mod: CPTII,95,, | Performed by: FAMILY MEDICINE

## 2022-02-04 PROCEDURE — 99214 PR OFFICE/OUTPT VISIT, EST, LEVL IV, 30-39 MIN: ICD-10-PCS | Mod: 95,,, | Performed by: FAMILY MEDICINE

## 2022-02-04 PROCEDURE — 90834 PR PSYCHOTHERAPY W/PATIENT, 45 MIN: ICD-10-PCS | Mod: AJ,HB,, | Performed by: SOCIAL WORKER

## 2022-02-04 PROCEDURE — 3072F PR LOW RISK FOR RETINOPATHY: ICD-10-PCS | Mod: CPTII,95,, | Performed by: FAMILY MEDICINE

## 2022-02-04 PROCEDURE — 99214 OFFICE O/P EST MOD 30 MIN: CPT | Mod: 95,,, | Performed by: FAMILY MEDICINE

## 2022-02-04 PROCEDURE — 3072F LOW RISK FOR RETINOPATHY: CPT | Mod: AJ,HB,CPTII, | Performed by: SOCIAL WORKER

## 2022-02-04 PROCEDURE — 90834 PSYTX W PT 45 MINUTES: CPT | Mod: AJ,HB,, | Performed by: SOCIAL WORKER

## 2022-02-04 RX ORDER — DOCUSATE SODIUM 100 MG/1
100 CAPSULE, LIQUID FILLED ORAL 2 TIMES DAILY
Qty: 180 CAPSULE | Refills: 0 | Status: SHIPPED | OUTPATIENT
Start: 2022-02-04 | End: 2022-03-23 | Stop reason: SDUPTHER

## 2022-02-04 RX ORDER — LURASIDONE HYDROCHLORIDE 80 MG/1
80 TABLET, FILM COATED ORAL DAILY
Qty: 30 TABLET | Refills: 1 | Status: SHIPPED | OUTPATIENT
Start: 2022-02-04 | End: 2022-02-23 | Stop reason: SDUPTHER

## 2022-02-04 RX ORDER — DICLOFENAC SODIUM 10 MG/G
2 GEL TOPICAL 4 TIMES DAILY PRN
Qty: 5 EACH | Refills: 0 | Status: SHIPPED | OUTPATIENT
Start: 2022-02-04 | End: 2023-02-06

## 2022-02-04 RX ORDER — LIDOCAINE AND PRILOCAINE 25; 25 MG/G; MG/G
CREAM TOPICAL
Qty: 60 G | Refills: 0 | Status: SHIPPED | OUTPATIENT
Start: 2022-02-04 | End: 2022-08-26 | Stop reason: SDUPTHER

## 2022-02-04 NOTE — PATIENT INSTRUCTIONS
"Patient Education       Diabetes and Diet   The Basics   Written by the doctors and editors at [facility]   Why is diet important in diabetes? -- Diet is important because it is part of diabetes treatment. Many people need to change what they eat and how much they eat to help treat their diabetes. It is important for people to treat their diabetes so that they:  · Keep their blood sugar at or near a normal level  · Prevent long-term problems, such as heart or kidney problems, that can happen in people with diabetes  Changing your diet can also help treat obesity, high blood pressure, and high cholesterol. These conditions can affect people with diabetes and can lead to future problems, such as heart attacks or strokes.  Who will work with me to change my diet? -- Your doctor or nurse will work with you to make a food plan to change your diet. They might also recommend that you work with a "dietitian." A dietitian is an expert on food and eating.  Do I need to eat at the same times every day? -- When and how often you should eat depends, in part, on the diabetes medicines you take. For example:  · People who take about the same amount of insulin at the same time each day (called a "fixed regimen") should eat meals at the same times. This is also true for people who take pills that increase insulin levels, such as sulfonylureas. Eating meals at the same time every day helps prevent low blood sugar.  · People who adjust the dose and timing of their insulin each day (called a "flexible regimen") do not always have to eat meals at the same time. That's because they can time their insulin dose for before they plan to eat, and also adjust the dose for how much they plan to eat.  · People who take medicines that don't usually cause low blood sugar, such as metformin, don't have to eat meals at the same time every day.  What do I need to think about when planning what to eat? -- Our bodies break down the food we eat into small " "pieces called carbohydrates, proteins, and fats.  When planning what to eat, people with diabetes need to think about:  · Carbohydrates (or "carbs") - Carbohydrates, which are sugars that our bodies use for energy, can raise a person's blood sugar level. Your doctor, nurse, or dietitian will tell you how many carbohydrates you should eat at each meal or snack. Foods that have carbohydrates include:  ? Bread, pasta, and rice  ? Vegetables and fruits  ? Dairy foods  ? Foods and drinks with added sugar  It is best to get your carbohydrates from fruits, vegetables, whole grains, and low-fat milk. It is best to avoid drinks with added sugar, like soda, juices, and sports drinks.   · Protein - Your doctor, nurse, or dietitian will tell you how much protein you should eat each day. It is best to eat lean meats, fish, eggs, beans, peas, soy products, nuts, and seeds.  · Fats - The type of fat you eat is more important than the amount of fat. "Saturated" and "trans" fats can increase your risk for heart problems, like a heart attack.  ? Foods that have saturated fats include meat, butter, cheese, and ice cream.  ? Foods that have trans fats include processed food with "partially hydrogenated oils" on the ingredient list. This may include fried foods, store bought cookies, muffins, pies, and cakes.  "Monounsaturated" and "polyunsaturated" fats are better for you. Foods with these types of fat include fish, avocado, olive oil, and nuts.  · Calories - People need to eat a certain amount of calories each day to keep their weight the same. People who are overweight and want to lose weight need to eat fewer calories each day.  · Fiber - Eating foods with a lot of fiber can help control a person's blood sugar level. Foods that have a lot of fiber include apples, green beans, peas, beans, lentils, nuts, oatmeal, and whole grains.  · Salt - People who have high blood pressure should not eat foods that contain a lot of salt (also " called sodium). People with high blood pressure should also eat healthy foods, such as fruits, vegetables, and low-fat dairy foods.  · Alcohol - Having more than 1 drink (for women) or 2 drinks (for men) a day can raise blood sugar levels. Also, drinks that have fruit juice or soda in them can raise blood sugar levels.  What can I do if I need to lose weight? -- If you need to lose weight, you can:  · Exercise - Try to get at least 30 minutes of physical activity a day, most days of the week. Even gentle exercise, like walking, is good for your health. Some people with diabetes need to change their medicine dose before they exercise. They might also need to check their blood sugar levels before and after exercising.  · Eat fewer calories - Your doctor, nurse, or dietitian can tell you how many calories you should eat each day in order to lose weight.  If you are worried about your weight, size, or shape, talk with your doctor, nurse, or dietitian. They can help you make changes to improve your health.  Can I eat the same foods as my family? -- Yes. You do not need to eat special foods if you have diabetes. You and your family can eat the same foods. Changing your diet is mostly about eating healthy foods and not eating too much.  What are the other parts of diabetes treatment? -- Besides changing your diet, the other parts of diabetes treatment are:  · Exercise  · Medicines  Some people with diabetes need to learn how to match their diet and exercise with their medicine dose. For example, people who use insulin might need to choose the dose of insulin they give themselves. To choose their dose, they need to think about:  · What they plan to eat at the next meal  · How much exercise they plan to do  · What their blood sugar level is  If the diet and exercise do not match the medicine dose, a person's blood sugar level can get too low or too high. Blood sugar levels that are too low or too high can cause  problems.  All topics are updated as new evidence becomes available and our peer review process is complete.  This topic retrieved from [employer] on: Sep 21, 2021.  Topic 12206 Version 7.0  Release: 29.4.2 - C29.263  © 2021 UpToDate, Inc. and/or its affiliates. All rights reserved.  Consumer Information Use and Disclaimer   This information is not specific medical advice and does not replace information you receive from your health care provider. This is only a brief summary of general information. It does NOT include all information about conditions, illnesses, injuries, tests, procedures, treatments, therapies, discharge instructions or life-style choices that may apply to you. You must talk with your health care provider for complete information about your health and treatment options. This information should not be used to decide whether or not to accept your health care provider's advice, instructions or recommendations. Only your health care provider has the knowledge and training to provide advice that is right for you. The use of this information is governed by the [employer] End User License Agreement, available at https://www.[employer]/en/solutions/MyEnergy/about/flower.The use of e[employer] Direct content is governed by the [employer] Terms of Use. ©2021 UpToDate, Inc. All rights reserved.  Copyright   © 2021 UpToDate, Inc. and/or its affiliates. All rights reserved.  Patient Education       Duloxetine (doo LOX e teen)   Brand Names: [place] Cymbalta; Tuckerma Lucinda   Brand Names: Jah AG-Duloxetine; APO-Duloxetine; Auro-Duloxetine; Cymbalta; JAMP-Duloxetine; M-Duloxetine; Mar-Duloxetine; MINT-Duloxetine; MYLAN-Duloxetine [DSC]; NRA-Duloxetine; PMS-Duloxetine; PRIVA-Duloxetine; RAN-Duloxetine; BHAKTI-Duloxetine; SANDOZ Duloxetine; TEVA-Duloxetine   Warning   · Drugs like this one have raised the chance of suicidal thoughts or actions in children and young adults. The risk may be greater in people who have had these  thoughts or actions in the past. All people who take this drug need to be watched closely. Call the doctor right away if signs like low mood (depression), nervousness, restlessness, grouchiness, panic attacks, or changes in mood or actions are new or worse. Call the doctor right away if any thoughts or actions of suicide occur.    What is this drug used for?   · It is used to treat low mood (depression).  · It is used to treat anxiety.  · It is used to help painful nerve diseases and diabetic nerve problems.  · It is used to ease long-term pain problems.  · It is used to treat fibromyalgia.  · It may be given to you for other reasons. Talk with the doctor.    What do I need to tell my doctor BEFORE I take this drug?   · If you are allergic to this drug; any part of this drug; or any other drugs, foods, or substances. Tell your doctor about the allergy and what signs you had.  · If you have any of these health problems: Kidney disease or liver disease.  · If you are taking thioridazine.  · If you are taking any of these drugs: Ciprofloxacin or fluvoxamine.  · If you are taking any of these drugs: Linezolid or methylene blue.  · If you have taken certain drugs for depression or Parkinson's disease in the last 14 days. This includes isocarboxazid, phenelzine, tranylcypromine, selegiline, or rasagiline. Very high blood pressure may happen.  This is not a list of all drugs or health problems that interact with this drug.  Tell your doctor and pharmacist about all of your drugs (prescription or OTC, natural products, vitamins) and health problems. You must check to make sure that it is safe for you to take this drug with all of your drugs and health problems. Do not start, stop, or change the dose of any drug without checking with your doctor.  What are some things I need to know or do while I take this drug?   · Tell all of your health care providers that you take this drug. This includes your doctors, nurses,  pharmacists, and dentists.  · Avoid driving and doing other tasks or actions that call for you to be alert until you see how this drug affects you.  · To lower the chance of feeling dizzy or passing out, rise slowly if you have been sitting or lying down. Be careful going up and down stairs.  · Low blood pressure, falls, and passing out have happened with this drug. Falls may lead to problems like broken bones and the need to go to the hospital. The chance of falling is raised with older people. Talk with the doctor.  · If you have high blood sugar (diabetes), you will need to watch your blood sugar closely.  · High blood pressure has happened with this drug. Have your blood pressure checked as you have been told by your doctor.  · Talk with your doctor before you use alcohol, marijuana or other forms of cannabis, or prescription or OTC drugs that may slow your actions.  · This drug may raise the chance of bleeding. Sometimes, bleeding can be life-threatening. Talk with the doctor.  · A severe and sometimes deadly problem called serotonin syndrome may happen. The risk may be greater if you also take certain other drugs. Call your doctor right away if you have agitation; change in balance; confusion; hallucinations; fever; fast or abnormal heartbeat; flushing; muscle twitching or stiffness; seizures; shivering or shaking; sweating a lot; severe diarrhea, upset stomach, or throwing up; or very bad headache.  · Some people may have a higher chance of eye problems with this drug. Your doctor may want you to have an eye exam to see if you have a higher chance of these eye problems. Call your doctor right away if you have eye pain, change in eyesight, or swelling or redness in or around the eye.  · This drug can cause low sodium levels. Very low sodium levels can be life-threatening, leading to seizures, passing out, trouble breathing, or death.  · This drug may affect certain lab tests. Tell all of your health care  providers and lab workers that you take this drug.  · If you are 65 or older, use this drug with care. You could have more side effects.  · This drug may affect growth in children and teens in some cases. They may need regular growth checks. Talk with the doctor.  · This drug may cause harm to the unborn baby if you take it while you are pregnant. If you are pregnant or you get pregnant while taking this drug, call your doctor right away.  · Taking this drug in the third trimester of pregnancy may lead to some health problems in the . Talk with the doctor.  · Tell your doctor if you are breast-feeding or plan to breast-feed. This drug passes into breast milk and may harm your baby.    What are some side effects that I need to call my doctor about right away?   WARNING/CAUTION: Even though it may be rare, some people may have very bad and sometimes deadly side effects when taking a drug. Tell your doctor or get medical help right away if you have any of the following signs or symptoms that may be related to a very bad side effect:  · Signs of an allergic reaction, like rash; hives; itching; red, swollen, blistered, or peeling skin with or without fever; wheezing; tightness in the chest or throat; trouble breathing, swallowing, or talking; unusual hoarseness; or swelling of the mouth, face, lips, tongue, or throat.  · Signs of low sodium levels like headache, trouble focusing, memory problems, feeling confused, weakness, seizures, or change in balance.  · Signs of bleeding like throwing up or coughing up blood; vomit that looks like coffee grounds; blood in the urine; black, red, or tarry stools; bleeding from the gums; abnormal vaginal bleeding; bruises without a cause or that get bigger; or bleeding you cannot stop.  · Signs of high or low blood pressure like very bad headache or dizziness, passing out, or change in eyesight.  · Seizures.  · Trouble passing urine.  · Sex problems have happened with drugs like  this one. This includes lowered interest in sex, trouble having an orgasm, ejaculation problems, or trouble getting or keeping an erection. If you have any sex problems or if you have any questions, talk with your doctor.  · Liver problems have happened with this drug. Rarely, this has been deadly. Call your doctor right away if you have signs of liver problems like dark urine, feeling tired, not hungry, upset stomach or stomach pain, light-colored stools, throwing up, or yellow skin or eyes.  · A severe skin reaction (Salas-Karan syndrome/toxic epidermal necrolysis) may happen. It can cause severe health problems that may not go away, and sometimes death. Get medical help right away if you have signs like red, swollen, blistered, or peeling skin (with or without fever); red or irritated eyes; or sores in your mouth, throat, nose, or eyes.  What are some other side effects of this drug?   All drugs may cause side effects. However, many people have no side effects or only have minor side effects. Call your doctor or get medical help if any of these side effects or any other side effects bother you or do not go away:  · Constipation, diarrhea, stomach pain, upset stomach, throwing up, or feeling less hungry.  · Headache.  · Dry mouth.  · Trouble sleeping.  · Feeling dizzy, sleepy, tired, or weak.  · Sweating a lot.  · Weight loss.  · Nose or throat irritation.  These are not all of the side effects that may occur. If you have questions about side effects, call your doctor. Call your doctor for medical advice about side effects.  You may report side effects to your national health agency.  You may report side effects to the FDA at 1-295.458.8502. You may also report side effects at https://www.fda.gov/medwatch.  How is this drug best taken?   Use this drug as ordered by your doctor. Read all information given to you. Follow all instructions closely.  All products:   · Keep taking this drug as you have been told by  your doctor or other health care provider, even if you feel well.  · Take with or without food.  · Do not stop taking this drug all of a sudden without calling your doctor. You may have a greater risk of side effects. If you need to stop this drug, you will want to slowly stop it as ordered by your doctor.  Capsules:   · Swallow whole. Do not chew, open, or crush.  Sprinkle capsule:   · Swallow whole. Do not chew or crush.  · If you cannot swallow this drug whole, you may sprinkle the contents on applesauce. If you do this, swallow the mixture right away without chewing.  · Those who have feeding tubes may use this drug. Use as you have been told. Flush the feeding tube after this drug is given.  What do I do if I miss a dose?   · Take a missed dose as soon as you think about it.  · If it is close to the time for your next dose, skip the missed dose and go back to your normal time.  · Do not take 2 doses at the same time or extra doses.    How do I store and/or throw out this drug?   · Store at room temperature in a dry place. Do not store in a bathroom.  · Keep all drugs in a safe place. Keep all drugs out of the reach of children and pets.  · Throw away unused or  drugs. Do not flush down a toilet or pour down a drain unless you are told to do so. Check with your pharmacist if you have questions about the best way to throw out drugs. There may be drug take-back programs in your area.    General drug facts   · If your symptoms or health problems do not get better or if they become worse, call your doctor.  · Do not share your drugs with others and do not take anyone else's drugs.  · Some drugs may have another patient information leaflet. If you have any questions about this drug, please talk with your doctor, nurse, pharmacist, or other health care provider.  · This drug comes with an extra patient fact sheet called a Medication Guide. Read it with care. Read it again each time this drug is refilled. If you  have any questions about this drug, please talk with the doctor, pharmacist, or other health care provider.  · If you think there has been an overdose, call your poison control center or get medical care right away. Be ready to tell or show what was taken, how much, and when it happened.    Consumer Information Use and Disclaimer   This generalized information is a limited summary of diagnosis, treatment, and/or medication information. It is not meant to be comprehensive and should be used as a tool to help the user understand and/or assess potential diagnostic and treatment options. It does NOT include all information about conditions, treatments, medications, side effects, or risks that may apply to a specific patient. It is not intended to be medical advice or a substitute for the medical advice, diagnosis, or treatment of a health care provider based on the health care provider's examination and assessment of a patient's specific and unique circumstances. Patients must speak with a health care provider for complete information about their health, medical questions, and treatment options, including any risks or benefits regarding use of medications. This information does not endorse any treatments or medications as safe, effective, or approved for treating a specific patient. UpToDate, Inc. and its affiliates disclaim any warranty or liability relating to this information or the use thereof. The use of this information is governed by the Terms of Use, available at https://www.[employer]/en/solutions/lexicomp/about/flower.  Last Reviewed Date   2021-08-10  Copyright   © 2021 UpToDate, Inc. and its affiliates and/or licensors. All rights reserved.  Patient Education       Lurasidone (loo NO i done)   Brand Names: [place] Latuda   Brand Names: Jah Latuda   Warning   · There is a higher chance of death in older adults who take this drug for mental problems caused by dementia. Most of the deaths were linked to  heart disease or infection. This drug is not approved to treat mental problems caused by dementia.  · Drugs like this one have raised the chance of suicidal thoughts or actions in children and young adults. The risk may be greater in people who have had these thoughts or actions in the past. All people who take this drug need to be watched closely. Call the doctor right away if signs like low mood (depression), nervousness, restlessness, grouchiness, panic attacks, or changes in mood or actions are new or worse. Call the doctor right away if any thoughts or actions of suicide occur.    What is this drug used for?   · It is used to treat schizophrenia.  · It is used to treat low mood (depression) in people with bipolar disorder.  · It may be given to you for other reasons. Talk with the doctor.    What do I need to tell my doctor BEFORE I take this drug?   · If you are allergic to this drug; any part of this drug; or any other drugs, foods, or substances. Tell your doctor about the allergy and what signs you had.  · If you are taking any of these drugs: Avasimibe, carbamazepine, clarithromycin, ketoconazole, mibefradil, phenytoin, rifampin, ritonavir, Fabrice's wort, or voriconazole.  This is not a list of all drugs or health problems that interact with this drug.  Tell your doctor and pharmacist about all of your drugs (prescription or OTC, natural products, vitamins) and health problems. You must check to make sure that it is safe for you to take this drug with all of your drugs and health problems. Do not start, stop, or change the dose of any drug without checking with your doctor.  What are some things I need to know or do while I take this drug?   · Tell all of your health care providers that you take this drug. This includes your doctors, nurses, pharmacists, and dentists.  · Avoid driving and doing other tasks or actions that call for you to be alert until you see how this drug affects you.  · To lower the  chance of feeling dizzy or passing out, rise slowly if you have been sitting or lying down. Be careful going up and down stairs.  · Have blood work checked as you have been told by the doctor. Talk with the doctor.  · High blood sugar or diabetes, high cholesterol, and weight gain have happened with drugs like this one. These may raise the chance of heart and brain blood vessel disease.  · Check your blood sugar as you have been told by your doctor.  · Avoid grapefruit and grapefruit juice.  · Avoid drinking alcohol while taking this drug.  · Talk with your doctor before you use marijuana, other forms of cannabis, or prescription or OTC drugs that may slow your actions.  · Be careful in hot weather or while being active. Drink lots of fluids to stop fluid loss.  · Dizziness, sleepiness, and feeling less stable may happen with this drug. These may lead to falling, which can cause broken bones or other health problems.  · Low white blood cell counts have happened with drugs like this one. This may lead to a higher chance of infection. Rarely, infections have been deadly. Tell your doctor if you have ever had a low white blood cell count. Call your doctor right away if you have signs of infection like fever, chills, or sore throat.  · Older adults with dementia taking drugs like this one have had a higher number of strokes. Sometimes these have been deadly. This drug is not approved to treat mental problems caused by dementia.  · If you are 65 or older, use this drug with care. You could have more side effects.  · Tell your doctor if you are pregnant, plan on getting pregnant, or are breast-feeding. You will need to talk about the benefits and risks to you and the baby.  · Taking this drug in the third trimester of pregnancy may lead to uncontrolled muscle movements and withdrawal in the .    What are some side effects that I need to call my doctor about right away?   WARNING/CAUTION: Even though it may be rare,  some people may have very bad and sometimes deadly side effects when taking a drug. Tell your doctor or get medical help right away if you have any of the following signs or symptoms that may be related to a very bad side effect:  · Signs of an allergic reaction, like rash; hives; itching; red, swollen, blistered, or peeling skin with or without fever; wheezing; tightness in the chest or throat; trouble breathing, swallowing, or talking; unusual hoarseness; or swelling of the mouth, face, lips, tongue, or throat.  · Signs of high blood sugar like confusion, feeling sleepy, more thirst, more hungry, passing urine more often, flushing, fast breathing, or breath that smells like fruit.  · Very bad dizziness or passing out.  · Fast, slow, or abnormal heartbeat.  · Shakiness, trouble moving around, or stiffness.  · Drooling.  · Seizures.  · Not able to focus.  · Trouble swallowing.  · Enlarged breasts, nipple discharge, not able to get or keep an erection, or menstrual changes.  · A very bad and sometimes deadly health problem called neuroleptic malignant syndrome (NMS) may happen. Call your doctor right away if you have any fever, muscle cramps or stiffness, dizziness, very bad headache, confusion, change in thinking, fast heartbeat, heartbeat that does not feel normal, or are sweating a lot.  · Some people may get a severe muscle problem called tardive dyskinesia. This problem may lessen or go away after stopping this drug, but it may not go away. The risk is greater with diabetes and in older adults, especially older females. The risk is greater with longer use or higher doses, but it may also occur after short-term use with low doses. Call your doctor right away if you have trouble controlling body movements or problems with your tongue, face, mouth, or jaw like tongue sticking out, puffing cheeks, mouth puckering, or chewing.  What are some other side effects of this drug?   All drugs may cause side effects.  However, many people have no side effects or only have minor side effects. Call your doctor or get medical help if any of these side effects or any other side effects bother you or do not go away:  · Feeling dizzy, sleepy, tired, or weak.  · Restlessness.  · Upset stomach or throwing up.  · Trouble sleeping.  · Feeling nervous and excitable.  · Diarrhea.  · Dry mouth.  · Weight gain.  · Runny nose.  These are not all of the side effects that may occur. If you have questions about side effects, call your doctor. Call your doctor for medical advice about side effects.  You may report side effects to your national health agency.  You may report side effects to the FDA at 1-769.192.6207. You may also report side effects at https://www.fda.gov/medwatch.  How is this drug best taken?   Use this drug as ordered by your doctor. Read all information given to you. Follow all instructions closely.  · Take this drug with food.  · Keep taking this drug as you have been told by your doctor or other health care provider, even if you feel well.  · Drink lots of noncaffeine liquids unless told to drink less liquid by your doctor.  · Do not change the dose or stop this drug without talking with the doctor.  What do I do if I miss a dose?   · Take a missed dose as soon as you think about it, with food.  · If it is close to the time for your next dose, skip the missed dose and go back to your normal time.  · Do not take 2 doses at the same time or extra doses.  · If you are not sure what to do if you miss a dose, call your doctor.    How do I store and/or throw out this drug?   · Store at room temperature in a dry place. Do not store in a bathroom.  · Keep all drugs in a safe place. Keep all drugs out of the reach of children and pets.  · Throw away unused or  drugs. Do not flush down a toilet or pour down a drain unless you are told to do so. Check with your pharmacist if you have questions about the best way to throw out  drugs. There may be drug take-back programs in your area.    General drug facts   · If your symptoms or health problems do not get better or if they become worse, call your doctor.  · Do not share your drugs with others and do not take anyone else's drugs.  · Some drugs may have another patient information leaflet. If you have any questions about this drug, please talk with your doctor, nurse, pharmacist, or other health care provider.  · This drug comes with an extra patient fact sheet called a Medication Guide. Read it with care. Read it again each time this drug is refilled. If you have any questions about this drug, please talk with the doctor, pharmacist, or other health care provider.  · If you think there has been an overdose, call your poison control center or get medical care right away. Be ready to tell or show what was taken, how much, and when it happened.    Consumer Information Use and Disclaimer   This generalized information is a limited summary of diagnosis, treatment, and/or medication information. It is not meant to be comprehensive and should be used as a tool to help the user understand and/or assess potential diagnostic and treatment options. It does NOT include all information about conditions, treatments, medications, side effects, or risks that may apply to a specific patient. It is not intended to be medical advice or a substitute for the medical advice, diagnosis, or treatment of a health care provider based on the health care provider's examination and assessment of a patient's specific and unique circumstances. Patients must speak with a health care provider for complete information about their health, medical questions, and treatment options, including any risks or benefits regarding use of medications. This information does not endorse any treatments or medications as safe, effective, or approved for treating a specific patient. UpTo[employer], Inc[employer] and its affiliates disclaim any warranty  or liability relating to this information or the use thereof. The use of this information is governed by the Terms of Use, available at https://www.[employer].com/en/solutions/lexicomp/about/flower.  Last Reviewed Date   2020-01-14  Copyright   © 2021 UpToDate, Inc. and its affiliates and/or licensors. All rights reserved.

## 2022-02-04 NOTE — PROGRESS NOTES
Subjective:       The patient location is: [place] in her parked car  Visit type: Virtual visit with synchronous audio and video  Total time spent with patient: 30 mins  Each patient to whom he or she provides medical services by telemedicine is:  (1) informed of the relationship between the physician and patient and the respective role of any other health care provider with respect to management of the patient; and (2) notified that he or she may decline to receive medical services by telemedicine and may withdraw from such care at any time.       Patient ID: Yolanda Betts is a 51 y.o. female.    Chief Complaint: DM, Bipolar, Abd pain, HTN      HPI   History of Present Illness:   Yolanda Betts 51 y.o. female presents today with     Per pt, 1-2 time week she would have abd pain, nausea, dizziness and hypersalivation. And BG would be elevated. She was seen for the sxs at [facility] last month and CT, and lab were negative.  Risk factor: Cymbalta ad latuda. She has bipolar and sees Dr Nation routinely.  She has hypertriglyceridemia and is fenofibrate  BP is controlled    Past Medical History:   Diagnosis Date    Abnormal Pap smear of cervix     HPV genital warts    Anemia     Anxiety     Arthritis     Asthma 10/11/2016    Bipolar 1 disorder     Diabetes mellitus, type 2     Dyslipidemia associated with type 2 diabetes mellitus 5/13/2019    General anesthetics causing adverse effect in therapeutic use     Genital warts     GERD (gastroesophageal reflux disease)     Herpes simplex virus (HSV) infection     Hyperlipidemia     Hypertension     Hypertension complicating diabetes 5/5/2019    Migraine with aura and without status migrainosus, not intractable 3/21/2016    Mild persistent asthma without complication 10/11/2016    Morbid obesity with body mass index (BMI) of 45.0 to 49.9 in adult 8/3/2017    Obstructive sleep apnea     ALEN (obstructive sleep apnea)     2L per N/C q HS     "Schizoaffective disorder, bipolar type 4/25/2019    Seasonal allergic rhinitis due to pollen 5/13/2019    Type 2 diabetes mellitus with hyperglycemia, with long-term current use of insulin 12/21/2017    Type 2 diabetes mellitus with hyperglycemia, without long-term current use of insulin 12/21/2017     Family History   Problem Relation Age of Onset    Diabetes Mother     Hyperlipidemia Mother     Hypertension Mother     Asthma Mother     COPD Mother     Glaucoma Mother     Thyroid disease Mother     Anesthesia problems Mother         "almost had a cardiac arrest" , blood clots    Hypertension Father     Hyperlipidemia Father     Cancer Father         Brain, lung, liver, kidney    Heart disease Maternal Grandmother     Hyperlipidemia Maternal Grandmother     Hypertension Maternal Grandmother     Cataracts Maternal Grandmother     Diabetes Maternal Grandmother     Heart disease Maternal Grandfather     Hyperlipidemia Maternal Grandfather     Hypertension Maternal Grandfather     Glaucoma Maternal Grandfather     Cancer Maternal Grandfather     Cataracts Maternal Grandfather     Macular degeneration Maternal Grandfather     Diabetes Maternal Grandfather     Heart disease Paternal Grandmother     Hyperlipidemia Paternal Grandmother     Hypertension Paternal Grandmother     Cataracts Paternal Grandmother     Heart disease Paternal Grandfather     Hyperlipidemia Paternal Grandfather     Hypertension Paternal Grandfather     Cataracts Paternal Grandfather     Breast cancer Maternal Cousin     Breast cancer Maternal Cousin     Breast cancer Maternal Cousin     Breast cancer Maternal Cousin      Social History     Socioeconomic History    Marital status: Single   Tobacco Use    Smoking status: Never Smoker    Smokeless tobacco: Never Used   Substance and Sexual Activity    Alcohol use: Yes     Alcohol/week: 0.0 standard drinks     Comment: socially  No alcohol 72h prior to sx    " "Drug use: No    Sexual activity: Yes     Partners: Male     Birth control/protection: Surgical     Comment: hyst   Social History Narrative    Long-term care nurse     Outpatient Encounter Medications as of 2/4/2022   Medication Sig Dispense Refill    amLODIPine (NORVASC) 10 MG tablet Take 1 tablet (10 mg total) by mouth once daily. 30 tablet 11    amLODIPine (NORVASC) 5 MG tablet Take 1 tablet (5 mg total) by mouth every evening. 30 tablet 11    aspirin (ECOTRIN) 81 MG EC tablet Take 81 mg by mouth once daily.      BD INSULIN SYRINGE ULTRA-FINE 1/2 mL 30 gauge x 1/2" Syrg   0    blood sugar diagnostic (ONETOUCH ULTRA TEST) Strp Check blood glucose 4 times daily as directed and as needed (dispense insurance preferred brand or patient choice) 200 each 5    blood sugar diagnostic Strp 1 each by Misc.(Non-Drug; Combo Route) route 4 (four) times daily. [facility] Glucose Test Strips 400 strip 3    blood-glucose meter Misc Use as directed 1 each 0    budesonide-formoterol 160-4.5 mcg (SYMBICORT) 160-4.5 mcg/actuation HFAA Inhale 2 puffs into the lungs every 12 (twelve) hours. Controller : Use spacer 10.2 g 11    clonazePAM (KLONOPIN) 1 MG tablet Take 1 tablet (1 mg total) by mouth 2 (two) times daily. 60 tablet 1    cyclobenzaprine (FLEXERIL) 10 MG tablet Take 0.5-1 tablets (5-10 mg total) by mouth 2 (two) times daily as needed for Muscle spasms. May cause drowsiness. 90 tablet 0    cycloSPORINE 0.05 % Drop Place 1 drop into both eyes 2 (two) times daily. 5.5 mL 11    diclofenac sodium (VOLTAREN) 1 % Gel Apply 2 grams topically 4 (four) times daily as needed. 5 each 0    docusate sodium (COLACE) 100 MG capsule Take 1 capsule (100 mg total) by mouth 2 (two) times daily. 180 capsule 0    DULoxetine (CYMBALTA) 30 MG capsule Take 1 capsule (30 mg total) by mouth every morning. 30 capsule 1    DULoxetine (CYMBALTA) 60 MG capsule Take 1 capsule (60 mg total) by mouth every evening. 30 capsule 1    fenofibrate " (TRICOR) 54 MG tablet Take 1 tablet (54 mg total) by mouth once daily. 30 tablet 3    fish oil-omega-3 fatty acids 300-1,000 mg capsule Take 1 capsule by mouth once daily.      flash glucose scanning reader (FREESTYLE AMY 2 READER) Misc Use as directed to check blood sugar 1 each 1    fluconazole (DIFLUCAN) 150 MG Tab Take one tablet and repeat dose in 3 days 2 tablet 1    frovatriptan (FROVA) 2.5 MG tablet Take 1 tablet at onset of acute migraine. May take 1 more tablet 2 hours later if needed. (MAX 2 TABLET PER DAY. MAX 4 TABLETS PER WEEK.) 9 tablet 1    furosemide (LASIX) 20 MG tablet Take 1 tablet (20 mg total) by mouth once daily. 30 tablet 11    insulin aspart U-100 (NOVOLOG FLEXPEN U-100 INSULIN) 100 unit/mL (3 mL) InPn pen Inject 10 Units into the skin 3 (three) times daily before meals. Use based on sliding scale 15 mL 3    insulin glargine U-300 conc (TOUJEO MAX U-300 SOLOSTAR) 300 unit/mL (3 mL) insulin pen Inject 90 Units into the skin every evening. 4 pen 3    lancets (ONETOUCH DELICA PLUS LANCET) 30 gauge Misc Use as directed 3 times daily 100 each 11    LIDOcaine-prilocaine (EMLA) cream Apply topically up to 4 times per day to affected area for pain relief 60 g 0    lurasidone (LATUDA) 80 mg Tab tablet Take 1 tablet (80 mg total) by mouth once daily. 30 tablet 1    magnesium oxide (MAG-OX) 400 mg (241.3 mg magnesium) tablet Take 1 tablet (400 mg total) by mouth once daily. 90 tablet 3    metFORMIN (GLUCOPHAGE) 1000 MG tablet Take 1 tablet (1,000 mg total) by mouth 2 (two) times daily with meals. 180 tablet 0    milnacipran (SAVELLA) 25 mg Tab tablet Take 1 tablet (25 mg total) by mouth 2 (two) times daily. 60 tablet 5    MULTIVIT,CALC,MINS/IRON/FOLIC (DAILY MULTIPLE FOR WOMEN ORAL) Take 1 tablet by mouth once daily.      mupirocin (BACTROBAN) 2 % ointment Apply topically 2 (two) times daily. 22 g 1    nystatin (MYCOSTATIN) cream Apply topically 2 (two) times daily. 30 g 2     "omeprazole (PRILOSEC) 20 MG capsule Take 1 capsule by mouth daily 30 capsule 5    pen needle, diabetic (BD ULTRA-FINE MINI PEN NEEDLE) 31 gauge x 3/16" Ndle Use 5 a day 200 each 3    pen needle, diabetic (BD ULTRA-FINE MINI PEN NEEDLE) 31 gauge x 3/16" Ndle Use one needle 5 times a day 200 each 3    polyethylene glycol (GLYCOLAX) 17 gram PwPk Take 17 g by mouth 2 (two) times daily. 180 each 0    spironolactone (ALDACTONE) 25 MG tablet Take 1 tablet (25 mg total) by mouth once daily. 30 tablet 6    ursodioL (ACTIGALL) 300 mg capsule Take 1 capsule by mouth 2 (two) times daily for 180 days. 60 capsule 5    valsartan (DIOVAN) 320 MG tablet Take 1 tablet (320 mg total) by mouth once daily. 90 tablet 3    [DISCONTINUED] diclofenac sodium (VOLTAREN) 1 % Gel Apply 2 grams topically 4 (four) times daily as needed. 5 each 0    [DISCONTINUED] glucagon, human recombinant, (GLUCAGON EMERGENCY KIT, HUMAN,) 1 mg SolR Inject 1 mg into the muscle as needed. Emergency use 1 each 1    [DISCONTINUED] lurasidone (LATUDA) 80 mg Tab tablet Take 1 tablet (80 mg total) by mouth once daily. 30 tablet 1     No facility-administered encounter medications on file as of 2/4/2022.       Review of Systems   Constitutional: Negative for activity change and unexpected weight change.   HENT: Positive for rhinorrhea. Negative for hearing loss and trouble swallowing.    Eyes: Negative for discharge and visual disturbance.   Respiratory: Negative for chest tightness and wheezing.    Cardiovascular: Negative for chest pain and palpitations.   Gastrointestinal: Positive for constipation and diarrhea. Negative for blood in stool and vomiting.   Endocrine: Negative for polydipsia and polyuria.   Genitourinary: Negative for difficulty urinating, dysuria, hematuria and menstrual problem.   Musculoskeletal: Positive for neck pain. Negative for arthralgias and joint swelling.   Neurological: Positive for weakness and headaches. "   Psychiatric/Behavioral: Positive for dysphoric mood. Negative for confusion.       Objective:      There were no vitals taken for this visit.  Physical Exam    CONSTITUTIONAL: No apparent distress. Appears comfortable. Does not appear acutely ill or septic. Appears adequately hydrated.  CARDIOVASCULAR: No perioral cyanosis  PULMONARY: Breathing unlabored. No retractions Chest expansion grossly normal.  PSYCHIATRIC: Alert and conversant and grossly oriented. Mood is grossly neutral. Affect appropriate. Judgment and insight grossly intact.  NEUROLOGIC: No focal sensory deficits reported.   Results for orders placed or performed in visit on 07/22/21   Hemoglobin A1c   Result Value Ref Range    Hemoglobin A1C 6.5 (H) 4.0 - 5.6 %    Estimated Avg Glucose 140 (H) 68 - 131 mg/dL   Lipid Panel   Result Value Ref Range    Cholesterol 188 120 - 199 mg/dL    Triglycerides 236 (H) 30 - 150 mg/dL    HDL 44 40 - 75 mg/dL    LDL Cholesterol 96.8 63.0 - 159.0 mg/dL    HDL/Cholesterol Ratio 23.4 20.0 - 50.0 %    Total Cholesterol/HDL Ratio 4.3 2.0 - 5.0    Non-HDL Cholesterol 144 mg/dL     *Note: Due to a large number of results and/or encounters for the requested time period, some results have not been displayed. A complete set of results can be found in Results Review.     Assessment:       1. Essential hypertension    2. Type 2 diabetes mellitus with other specified complication, with long-term current use of insulin    3. H/O gastric sleeve    4. Pure hypertriglyceridemia    5. Bipolar 1 disorder    6. Schizoaffective disorder, bipolar type        Plan:   Essential hypertension  -     Lipids Digital Medicine (LDMP) Enrollment Order  -     Lipids Digital Medicine (LDMP): Assign Onboarding Questionnaires    Type 2 diabetes mellitus with other specified complication, with long-term current use of insulin  -     MICROALBUMIN / CREATININE RATIO URINE; Future; Expected date: 02/04/2022  -     Hemoglobin A1C; Standing    H/O gastric  sleeve    Pure hypertriglyceridemia  -     Lipid Panel; Standing    Bipolar 1 disorder    Schizoaffective disorder, bipolar type  -with side effect of medication     She will follow up with psych for med management-cy,keenan and Latuda likely contributory to her sxs.  Continue all meds.  AIC on Monday and follow up in 3 months  Treatment options and alternatives were discussed with the patient. Patient was given ample time to ask questions. All questions were answered. Voices understanding and acceptance of this advice. Will call back if any further questions or concerns.    Sasha Sorenson MD

## 2022-02-04 NOTE — PROGRESS NOTES
Individual Psychotherapy (PhD/LCSW)    2/4/2022    Site:  [place]         Therapeutic Intervention: Met with patient.  Outpatient - Insight oriented psychotherapy 45 min - CPT code 77893    Chief complaint/reason for encounter: depression, anxiety and grief     Interval history and content of current session: Pt continues to struggle with grief.  Sister's birthday is in February so she has been thinking about her a lot.  Pt worried about nephew as it seems that his Dad will be going to correction soon and it is not clear who is going to care for him.  She does not want him to go live with his Dad' family but at this time she does not have the ability to bring him into her home as she is staying with her boyfriend.  Unable to find a home she can afford.  Pt has resisted the role of matriarch of the family as she had the role of parents and caregiver to her sisters throughout their childhood as mom was too ill to care for them.  Talked about how they would stay with different family members when mom would go in and out of the hospital and the families that took them in were so unkind because they never wanted the responsibility of them.  When they left [place] to come to [place] for more support it was not the support they had hoped for and ended up back in section 8 housing a year after being in [place].  Prior to that they were in a small trailer in aunbebo's backyard despite the fact she had a home that could have accommodated them.      Treatment plan:  · Target symptoms: depression, anxiety , grief  · Why chosen therapy is appropriate versus another modality: relevant to diagnosis, patient responds to this modality, evidence based practice  · Outcome monitoring methods: self-report, observation  · Therapeutic intervention type: insight oriented psychotherapy, supportive psychotherapy    Risk parameters:  Patient reports no suicidal ideation  Patient reports no homicidal ideation  Patient reports no self-injurious  behavior  Patient reports no violent behavior    Verbal deficits: None    Patient's response to intervention:  The patient's response to intervention is accepting.    Progress toward goals and other mental status changes:  The patient's progress toward goals is good.    Diagnosis:     ICD-10-CM ICD-9-CM   1. Bipolar I disorder, current episode depressed  F31.9 296.50   2. Grief  F43.21 309.0   3. Generalized anxiety disorder  F41.1 300.02       Plan:  individual psychotherapy    Return to clinic: 2 weeks    Length of Service (minutes): 45     Liv Stout   02/04/2022   7:55 AM

## 2022-02-07 ENCOUNTER — LAB VISIT (OUTPATIENT)
Dept: LAB | Facility: HOSPITAL | Age: 52
End: 2022-02-07
Attending: FAMILY MEDICINE
Payer: MEDICAID

## 2022-02-07 ENCOUNTER — TELEPHONE (OUTPATIENT)
Dept: PHARMACY | Facility: CLINIC | Age: 52
End: 2022-02-07
Payer: MEDICAID

## 2022-02-07 ENCOUNTER — TELEPHONE (OUTPATIENT)
Dept: UROLOGY | Facility: CLINIC | Age: 52
End: 2022-02-07
Payer: MEDICAID

## 2022-02-07 ENCOUNTER — OFFICE VISIT (OUTPATIENT)
Dept: RHEUMATOLOGY | Facility: CLINIC | Age: 52
End: 2022-02-07
Payer: MEDICAID

## 2022-02-07 VITALS
DIASTOLIC BLOOD PRESSURE: 95 MMHG | BODY MASS INDEX: 41.07 KG/M2 | HEART RATE: 95 BPM | HEIGHT: 56 IN | WEIGHT: 182.56 LBS | SYSTOLIC BLOOD PRESSURE: 131 MMHG

## 2022-02-07 DIAGNOSIS — E78.1 PURE HYPERTRIGLYCERIDEMIA: ICD-10-CM

## 2022-02-07 DIAGNOSIS — M79.7 FIBROMYALGIA: ICD-10-CM

## 2022-02-07 DIAGNOSIS — G89.29 CHRONIC RIGHT SHOULDER PAIN: ICD-10-CM

## 2022-02-07 DIAGNOSIS — Z79.4 TYPE 2 DIABETES MELLITUS WITH OTHER SPECIFIED COMPLICATION, WITH LONG-TERM CURRENT USE OF INSULIN: ICD-10-CM

## 2022-02-07 DIAGNOSIS — M75.41 IMPINGEMENT SYNDROME OF RIGHT SHOULDER: Primary | ICD-10-CM

## 2022-02-07 DIAGNOSIS — M25.511 CHRONIC RIGHT SHOULDER PAIN: ICD-10-CM

## 2022-02-07 DIAGNOSIS — E11.69 TYPE 2 DIABETES MELLITUS WITH OTHER SPECIFIED COMPLICATION, WITH LONG-TERM CURRENT USE OF INSULIN: ICD-10-CM

## 2022-02-07 DIAGNOSIS — M54.6 THORACIC BACK PAIN, UNSPECIFIED BACK PAIN LATERALITY, UNSPECIFIED CHRONICITY: ICD-10-CM

## 2022-02-07 DIAGNOSIS — N20.0 KIDNEY STONE: ICD-10-CM

## 2022-02-07 DIAGNOSIS — M75.21 BICEPS TENDINITIS OF RIGHT SHOULDER: ICD-10-CM

## 2022-02-07 LAB
CHOLEST SERPL-MCNC: 203 MG/DL (ref 120–199)
CHOLEST/HDLC SERPL: 4.2 {RATIO} (ref 2–5)
ESTIMATED AVG GLUCOSE: 137 MG/DL (ref 68–131)
HBA1C MFR BLD: 6.4 % (ref 4–5.6)
HDLC SERPL-MCNC: 48 MG/DL (ref 40–75)
HDLC SERPL: 23.6 % (ref 20–50)
LDLC SERPL CALC-MCNC: 122.4 MG/DL (ref 63–159)
NONHDLC SERPL-MCNC: 155 MG/DL
TRIGL SERPL-MCNC: 163 MG/DL (ref 30–150)

## 2022-02-07 PROCEDURE — 20610 LARGE JOINT ASPIRATION/INJECTION: R SUBACROMIAL BURSA: ICD-10-PCS | Mod: S$PBB,RT,, | Performed by: PHYSICIAN ASSISTANT

## 2022-02-07 PROCEDURE — 99214 OFFICE O/P EST MOD 30 MIN: CPT | Mod: 25,S$PBB,, | Performed by: PHYSICIAN ASSISTANT

## 2022-02-07 PROCEDURE — 3008F PR BODY MASS INDEX (BMI) DOCUMENTED: ICD-10-PCS | Mod: CPTII,,, | Performed by: PHYSICIAN ASSISTANT

## 2022-02-07 PROCEDURE — 3080F PR MOST RECENT DIASTOLIC BLOOD PRESSURE >= 90 MM HG: ICD-10-PCS | Mod: CPTII,,, | Performed by: PHYSICIAN ASSISTANT

## 2022-02-07 PROCEDURE — 20610 DRAIN/INJ JOINT/BURSA W/O US: CPT | Mod: S$PBB,RT,, | Performed by: PHYSICIAN ASSISTANT

## 2022-02-07 PROCEDURE — 3008F BODY MASS INDEX DOCD: CPT | Mod: CPTII,,, | Performed by: PHYSICIAN ASSISTANT

## 2022-02-07 PROCEDURE — 20610 DRAIN/INJ JOINT/BURSA W/O US: CPT | Mod: PBBFAC | Performed by: PHYSICIAN ASSISTANT

## 2022-02-07 PROCEDURE — 83036 HEMOGLOBIN GLYCOSYLATED A1C: CPT | Performed by: FAMILY MEDICINE

## 2022-02-07 PROCEDURE — 99215 OFFICE O/P EST HI 40 MIN: CPT | Mod: PBBFAC | Performed by: PHYSICIAN ASSISTANT

## 2022-02-07 PROCEDURE — 80061 LIPID PANEL: CPT | Performed by: FAMILY MEDICINE

## 2022-02-07 PROCEDURE — 99999 PR PBB SHADOW E&M-EST. PATIENT-LVL V: ICD-10-PCS | Mod: PBBFAC,,, | Performed by: PHYSICIAN ASSISTANT

## 2022-02-07 PROCEDURE — 3075F SYST BP GE 130 - 139MM HG: CPT | Mod: CPTII,,, | Performed by: PHYSICIAN ASSISTANT

## 2022-02-07 PROCEDURE — 99999 PR PBB SHADOW E&M-EST. PATIENT-LVL V: CPT | Mod: PBBFAC,,, | Performed by: PHYSICIAN ASSISTANT

## 2022-02-07 PROCEDURE — 3072F PR LOW RISK FOR RETINOPATHY: ICD-10-PCS | Mod: CPTII,,, | Performed by: PHYSICIAN ASSISTANT

## 2022-02-07 PROCEDURE — 99214 PR OFFICE/OUTPT VISIT, EST, LEVL IV, 30-39 MIN: ICD-10-PCS | Mod: 25,S$PBB,, | Performed by: PHYSICIAN ASSISTANT

## 2022-02-07 PROCEDURE — 3080F DIAST BP >= 90 MM HG: CPT | Mod: CPTII,,, | Performed by: PHYSICIAN ASSISTANT

## 2022-02-07 PROCEDURE — 1159F PR MEDICATION LIST DOCUMENTED IN MEDICAL RECORD: ICD-10-PCS | Mod: CPTII,,, | Performed by: PHYSICIAN ASSISTANT

## 2022-02-07 PROCEDURE — 3072F LOW RISK FOR RETINOPATHY: CPT | Mod: CPTII,,, | Performed by: PHYSICIAN ASSISTANT

## 2022-02-07 PROCEDURE — 3075F PR MOST RECENT SYSTOLIC BLOOD PRESS GE 130-139MM HG: ICD-10-PCS | Mod: CPTII,,, | Performed by: PHYSICIAN ASSISTANT

## 2022-02-07 PROCEDURE — 36415 COLL VENOUS BLD VENIPUNCTURE: CPT | Performed by: FAMILY MEDICINE

## 2022-02-07 PROCEDURE — 1159F MED LIST DOCD IN RCRD: CPT | Mod: CPTII,,, | Performed by: PHYSICIAN ASSISTANT

## 2022-02-07 RX ORDER — TRIAMCINOLONE ACETONIDE 40 MG/ML
40 INJECTION, SUSPENSION INTRA-ARTICULAR; INTRAMUSCULAR
Status: DISCONTINUED | OUTPATIENT
Start: 2022-02-07 | End: 2022-02-07 | Stop reason: HOSPADM

## 2022-02-07 RX ADMIN — TRIAMCINOLONE ACETONIDE 40 MG: 200 INJECTION, SUSPENSION INTRA-ARTICULAR; INTRAMUSCULAR at 10:02

## 2022-02-07 NOTE — PROGRESS NOTES
Patient ID: Yolanda Betts is a 51 y.o. female.    Chief Complaint: Fibromyalgia and Osteoarthritis      HPI: Yolnada Betts  is a 51 y.o. female who c/o Fibromyalgia and Osteoarthritis   for duration of chronically.  She has had some injections the shoulder in the past.  This is my 1st time seeing the patient for this problem.  She has established patient in Rheumatology for fibromyalgia.  She has also established with interventional pain management.  The shoulder is hurting different than her normal fibromyalgia pain.  Pain level is rated at 10/10.  Worsened with overhead use and sleeping at night.  It wakes her up.  Alleviating factors include not moving it.  She does also have myalgia complaints associated with fibromyalgia, but again she states the shoulder is different.    Past Medical History:   Diagnosis Date    Abnormal Pap smear of cervix     HPV genital warts    Anemia     Anxiety     Arthritis     Asthma 10/11/2016    Bipolar 1 disorder     Diabetes mellitus, type 2     Dyslipidemia associated with type 2 diabetes mellitus 5/13/2019    General anesthetics causing adverse effect in therapeutic use     Genital warts     GERD (gastroesophageal reflux disease)     Herpes simplex virus (HSV) infection     Hyperlipidemia     Hypertension     Hypertension complicating diabetes 5/5/2019    Migraine with aura and without status migrainosus, not intractable 3/21/2016    Mild persistent asthma without complication 10/11/2016    Morbid obesity with body mass index (BMI) of 45.0 to 49.9 in adult 8/3/2017    Obstructive sleep apnea     ALEN (obstructive sleep apnea)     2L per N/C q HS    Schizoaffective disorder, bipolar type 4/25/2019    Seasonal allergic rhinitis due to pollen 5/13/2019    Type 2 diabetes mellitus with hyperglycemia, with long-term current use of insulin 12/21/2017    Type 2 diabetes mellitus with hyperglycemia, without long-term current use of insulin 12/21/2017  "    Past Surgical History:   Procedure Laterality Date    abcess removed right back      BELT ABDOMINOPLASTY  1996    BREAST SURGERY Bilateral 1996    Reduction     SECTION      X 2    COLONOSCOPY      COLONOSCOPY N/A 2018    Procedure: colonoscopy for iron deficiency anemia;  Surgeon: Frankie Sanchez MD;  Location: [facility] ENDO;  Service: Endoscopy;  Laterality: N/A;    COLONOSCOPY N/A 2018    Procedure: COLONOSCOPY;  Surgeon: Frankie Sanchez MD;  Location: [facility] ENDO;  Service: Endoscopy;  Laterality: N/A;    HYSTERECTOMY      w/ BSO; hypermenorrhea    MOUTH SURGERY      OOPHORECTOMY      hyst/bso, hypermenorrhea    ROBOT-ASSISTED CHOLECYSTECTOMY USING DA DARI XI N/A 1/3/2020    Procedure: XI ROBOTIC CHOLECYSTECTOMY;  Surgeon: Damir Driscoll MD;  Location: [facility] OR;  Service: General;  Laterality: N/A;    TOTAL REDUCTION MAMMOPLASTY      TUBAL LIGATION       Family History   Problem Relation Age of Onset    Diabetes Mother     Hyperlipidemia Mother     Hypertension Mother     Asthma Mother     COPD Mother     Glaucoma Mother     Thyroid disease Mother     Anesthesia problems Mother         "almost had a cardiac arrest" , blood clots    Hypertension Father     Hyperlipidemia Father     Cancer Father         Brain, lung, liver, kidney    Heart disease Maternal Grandmother     Hyperlipidemia Maternal Grandmother     Hypertension Maternal Grandmother     Cataracts Maternal Grandmother     Diabetes Maternal Grandmother     Heart disease Maternal Grandfather     Hyperlipidemia Maternal Grandfather     Hypertension Maternal Grandfather     Glaucoma Maternal Grandfather     Cancer Maternal Grandfather     Cataracts Maternal Grandfather     Macular degeneration Maternal Grandfather     Diabetes Maternal Grandfather     Heart disease Paternal Grandmother     Hyperlipidemia Paternal Grandmother     Hypertension Paternal Grandmother     Cataracts Paternal " "Grandmother     Heart disease Paternal Grandfather     Hyperlipidemia Paternal Grandfather     Hypertension Paternal Grandfather     Cataracts Paternal Grandfather     Breast cancer Maternal Cousin     Breast cancer Maternal Cousin     Breast cancer Maternal Cousin     Breast cancer Maternal Cousin      Social History     Socioeconomic History    Marital status: Single   Tobacco Use    Smoking status: Never Smoker    Smokeless tobacco: Never Used   Substance and Sexual Activity    Alcohol use: Yes     Alcohol/week: 0.0 standard drinks     Comment: socially  No alcohol 72h prior to sx    Drug use: No    Sexual activity: Yes     Partners: Male     Birth control/protection: Surgical     Comment: hyst   Social History Narrative    Long-term care nurse     Medication List with Changes/Refills   Current Medications    AMLODIPINE (NORVASC) 10 MG TABLET    Take 1 tablet (10 mg total) by mouth once daily.    AMLODIPINE (NORVASC) 5 MG TABLET    Take 1 tablet (5 mg total) by mouth every evening.    ASPIRIN (ECOTRIN) 81 MG EC TABLET    Take 81 mg by mouth once daily.    BD INSULIN SYRINGE ULTRA-FINE 1/2 ML 30 GAUGE X 1/2" SYRG        BLOOD SUGAR DIAGNOSTIC (ONETOUCH ULTRA TEST) STRP    Check blood glucose 4 times daily as directed and as needed (dispense insurance preferred brand or patient choice)    BLOOD SUGAR DIAGNOSTIC STRP    1 each by Misc.(Non-Drug; Combo Route) route 4 (four) times daily. [facility] Glucose Test Strips    BLOOD-GLUCOSE METER MISC    Use as directed    BUDESONIDE-FORMOTEROL 160-4.5 MCG (SYMBICORT) 160-4.5 MCG/ACTUATION HFAA    Inhale 2 puffs into the lungs every 12 (twelve) hours. Controller : Use spacer    CLONAZEPAM (KLONOPIN) 1 MG TABLET    Take 1 tablet (1 mg total) by mouth 2 (two) times daily.    CYCLOBENZAPRINE (FLEXERIL) 10 MG TABLET    Take 0.5-1 tablets (5-10 mg total) by mouth 2 (two) times daily as needed for Muscle spasms. May cause drowsiness.    CYCLOSPORINE 0.05 % DROP    " Place 1 drop into both eyes 2 (two) times daily.    DICLOFENAC SODIUM (VOLTAREN) 1 % GEL    Apply 2 grams topically 4 (four) times daily as needed.    DOCUSATE SODIUM (COLACE) 100 MG CAPSULE    Take 1 capsule (100 mg total) by mouth 2 (two) times daily.    DULOXETINE (CYMBALTA) 30 MG CAPSULE    Take 1 capsule (30 mg total) by mouth every morning.    DULOXETINE (CYMBALTA) 60 MG CAPSULE    Take 1 capsule (60 mg total) by mouth every evening.    FENOFIBRATE (TRICOR) 54 MG TABLET    Take 1 tablet (54 mg total) by mouth once daily.    FISH OIL-OMEGA-3 FATTY ACIDS 300-1,000 MG CAPSULE    Take 1 capsule by mouth once daily.    FLASH GLUCOSE SCANNING READER ([employer] AMY 2 READER[place] [facility] [place]    Use as directed to check blood sugar    FLUCONAZOLE (DIFLUCAN) 150 MG TAB    Take one tablet and repeat dose in 3 days    FROVATRIPTAN (FROVA) 2.5 MG TABLET    Take 1 tablet at onset of acute migraine. May take 1 more tablet 2 hours later if needed. (MAX 2 TABLET PER DAY. MAX 4 TABLETS PER WEEK.)    FUROSEMIDE (LASIX) 20 MG TABLET    Take 1 tablet (20 mg total) by mouth once daily.    INSULIN ASPART U-100 (NOVOLOG FLEXPEN U-100 INSULIN) 100 UNIT/ML (3 ML) INPN PEN    Inject 10 Units into the skin 3 (three) times daily before meals. Use based on sliding scale    INSULIN GLARGINE U-300 CONC (TOUJEO MAX U-300 SOLOSTAR) 300 UNIT/ML (3 ML) INSULIN PEN    Inject 90 Units into the skin every evening.    LANCETS (ONETOUCH DELICA PLUS LANCET) 30 GAUGE MISC    Use as directed 3 times daily    LIDOCAINE-PRILOCAINE (EMLA) CREAM    Apply topically up to 4 times per day to affected area for pain relief    LURASIDONE (LATUDA) 80 MG TAB TABLET    Take 1 tablet (80 mg total) by mouth once daily.    MAGNESIUM OXIDE (MAG-OX) 400 MG (241.3 MG MAGNESIUM) TABLET    Take 1 tablet (400 mg total) by mouth once daily.    METFORMIN (GLUCOPHAGE) 1000 MG TABLET    Take 1 tablet (1,000 mg total) by mouth 2 (two) times daily with meals.    MILNACIPRAN (SAVELLA) 25 MG  "TAB TABLET    Take 1 tablet (25 mg total) by mouth 2 (two) times daily.    MULTIVIT,CALC,MINS/IRON/FOLIC (DAILY MULTIPLE FOR WOMEN ORAL)    Take 1 tablet by mouth once daily.    MUPIROCIN (BACTROBAN) 2 % OINTMENT    Apply topically 2 (two) times daily.    NYSTATIN (MYCOSTATIN) CREAM    Apply topically 2 (two) times daily.    PEN NEEDLE, DIABETIC (BD ULTRA-FINE MINI PEN NEEDLE) 31 GAUGE X 3/16" NDLE    Use 5 a day    PEN NEEDLE, DIABETIC (BD ULTRA-FINE MINI PEN NEEDLE) 31 GAUGE X 3/16" NDLE    Use one needle 5 times a day    POLYETHYLENE GLYCOL (GLYCOLAX) 17 GRAM PWPK    Take 17 g by mouth 2 (two) times daily.    SPIRONOLACTONE (ALDACTONE) 25 MG TABLET    Take 1 tablet (25 mg total) by mouth once daily.    VALSARTAN (DIOVAN) 320 MG TABLET    Take 1 tablet (320 mg total) by mouth once daily.     Review of patient's allergies indicates:   Allergen Reactions    Codeine Itching    Hydromorphone Other (See Comments)     Can't wake up for long time if taken  Slow to wake up after surgery after receiving    Lyrica [pregabalin] Itching    Neuromuscular blockers, steroidal Hives     some    Latex, natural rubber Rash    Morphine Rash     itching    Norco [hydrocodone-acetaminophen] Itching, Rash and Hallucinations    Seconal [secobarbital sodium] Rash     itching    Tylox [oxycodone-acetaminophen] Rash       Objective:        General    Nursing note and vitals reviewed.  Constitutional: She is oriented to person, place, and time. She appears well-developed and well-nourished.   HENT:   Head: Normocephalic and atraumatic.   Eyes: EOM are normal.   Pulmonary/Chest: Effort normal.   Neurological: She is alert and oriented to person, place, and time.   Psychiatric: She has a normal mood and affect. Her behavior is normal.         Back (L-Spine & T-Spine) / Neck (C-Spine) Exam     Tenderness   The patient is tender to palpation of the right trapezial.   Right Shoulder Exam     Inspection/Observation   Swelling: " absent  Bruising: absent  Scars: absent  Deformity: absent  Scapular Dyskinesia: negative    Tenderness   The patient is tender to palpation of the greater tuberosity, acromioclavicular joint and biceps tendon.    Range of Motion   Active abduction: abnormal   Passive abduction: normal   Extension: abnormal   Forward Flexion: abnormal   Forward Elevation: abnormal  Adduction: normal  External Rotation 0 degrees: normal   Internal rotation 0 degrees:  Sacrum abnormal     Tests & Signs   Cross arm: negative  Drop arm: negative  Moseley test: positive  Impingement: positive  Active Compression Test (Kearney's Sign): positive  Speed's Test: positive    Other   Sensation: normal    Comments:  Multiple tender spots associated with FM    Left Shoulder Exam     Inspection/Observation   Swelling: absent  Bruising: absent  Scars: absent  Deformity: absent  Scapular Dyskinesia: negative    Range of Motion   Active abduction: normal   Passive abduction: normal   Extension: normal   Forward Flexion: normal   Forward Elevation: normal  Adduction: normal  External Rotation 0 degrees: normal   Internal rotation 0 degrees: normal     Other   Sensation: normal       Muscle Strength   Right Upper Extremity   Shoulder Abduction: 5/5   Shoulder Internal Rotation: 5/5   Shoulder External Rotation: 5/5   Left Upper Extremity  Shoulder Abduction: 5/5   Shoulder Internal Rotation: 5/5   Shoulder External Rotation: 5/5     Vascular Exam     Right Pulses      Radial:                    2+      Left Pulses      Radial:                    2+      Capillary Refill  Right Hand: normal capillary refill  Left Hand: normal capillary refill      Xray/MRI images and report were reviewed today.  I agree with the radiologist's interpretation.    X-ray Shoulder 2 or More Views Right  Order: [id number]   Status: Final result     Visible to patient: Yes (seen)     Next appt: 02/11/2022 at 07:00 AM in Psychiatry (Liv Stout LCSW)     Dx: Fall, initial  encounter; Acute pain o...     0 Result Notes    Details    Reading Physician Reading Date Result Priority   Nimesh MEHNAZJohnnie Martinez DO  [phone] 5/19/2020 Routine     Narrative & Impression  EXAMINATION:  XR SHOULDER COMPLETE 2 OR MORE VIEWS RIGHT     CLINICAL HISTORY:  Unspecified fall, initial encounter     TECHNIQUE:  Two or three views of the right shoulder were preformed.     COMPARISON:  None     FINDINGS:  No acute fracture or dislocation.  No significant AC joint arthropathy.  No significant degenerative change at the glenohumeral joint.     Impression:     1.  As above        Electronically signed by: Nimesh Martinez DO  Date:                                            05/19/2020  Time:                                           12:20         MRI Shoulder Without Contrast Right  Order: [id number]   Status: Final result     Visible to patient: Yes (seen)     Next appt: 02/11/2022 at 07:00 AM in Psychiatry (Liv Stout [facility])     Dx: Chronic right shoulder pain     0 Result Notes     1 Patient Communication    Details    Reading Physician Reading Date Result Priority   Joel Gutierrez MD  [phone] 7/19/2020 Routine     Narrative & Impression  EXAMINATION:  MRI SHOULDER WITHOUT CONTRAST RIGHT     CLINICAL HISTORY:  Shoulder pain, bursitis suspected, nondiagnostic xray;  Pain in right shoulder     TECHNIQUE:  Multisequence, multiplanar MR imaging of the shoulder performed without contrast.     COMPARISON:  05/19/2020 radiograph     FINDINGS:  Rotator cuff:     Supraspinatus/infraspinatus: Tendinosis present with possible intrasubstance interstitial tearing at the posterior supraspinatus conjoint tendon junction.     Subscapularis: Intact     Teres minor: Intact     Muscle bulk is maintained.     Labrum: Glenoid labrum is intact     Biceps: Long head biceps tendon is intact.     Bone: No fracture present.  Red marrow conversion findings present which can be associated with multiple causes including  anemia, sickle cell, smoking and obesity.     Acromioclavicular joint:Mild to moderate degenerative findings.     Cartilage: Glenohumeral joint demonstrates age expected loss.     Miscellaneous: No significant joint effusion.  Trace subacromial/subdeltoid bursal fluid present.  Small amount of fluid also present within the subcoracoid bursa.     Impression:     1. Supraspinatus/infraspinatus tendinosis with possible short-segment intrasubstance interstitial tearing.  2. Suspected mild subacromial/subdeltoid bursitis.  Small amount of subcoracoid bursal fluid as well.  3. AC joint degenerative hypertrophy findings.  4. Other findings as above.        Electronically signed by: Joel Gutierrez MD  Date:                                            07/19/2020  Time:                                           11:41         CT report also reviewed as below  Chandu Vail II, MD - 01/28/2022   Formatting of this note might be different from the original.   CT ABDOMEN PELVIS W IV CONTRAST     HISTORY:  post prandial epigastric pain,  h/o sleeve     COMPARISON:  8/8/2015     TECHNIQUE: Axial images were obtained from the lung bases to the ischial tuberosities following uneventful administration of intravenous contrast. Oral contrast was given.  Automated exposure technique was utilized for dose reduction. Coronal reconstructions were made.     FINDINGS:     Lower Thorax: Within normal limits     Liver: Diffuse hepatic steatosis. No focal hepatic abnormalities     Spleen: Normal appearance     Pancreas: Normal appearance     Gallbladder: Not visualized, presumably surgically absent.     Biliary tree: No intrahepatic or extrahepatic biliary ductal dilatation     Adrenals: Normal appearance     Kidneys, Ureters, Bladder: Normal in size, with normal cortical thickness. Questionable punctate, nonobstructive right renal calculi. No hydronephrosis. Bladder appears normal     Bowel: Prior sleeve gastrectomy. There is a small hiatal  hernia.. Stomach otherwise appears normal. Small bowel is normal in caliber, and without appreciable inflammatory change. The appendix is normal. There are no acute inflammatory changes within the colon.     Pelvic Organs: Hysterectomy     Peritoneum: No free intraperitoneal fluid or air     Vasculature: The abdominal aorta and IVC are normal in course and caliber.     Lymphatics: No pathologic appearing abdominal or pelvic lymph nodes     Bones: No acute or suspicious bony abnormalities.     Soft tissues: Normal appearance     IMPRESSION:     1.  No findings of acute abnormality in the abdomen or pelvis.   2.  Small hiatal hernia. Otherwise normal postoperative appearance of the stomach   3.  Hepatic steatosis  Exam End: 01/28/22  8:29 PM Last Resulted: 01/28/22  8:41 PM   Received From: [employer] [facility]sidiaries and Affiliates  Result Received: 02/04/22  7:01 AM            Assessment:       Encounter Diagnoses   Name Primary?    Impingement syndrome of right shoulder Yes    Chronic right shoulder pain     Biceps tendinitis of right shoulder     Thoracic back pain, unspecified back pain laterality, unspecified chronicity     Kidney stone     Fibromyalgia           Plan:       Yolanda was seen today for fibromyalgia and osteoarthritis.    Diagnoses and all orders for this visit:    Impingement syndrome of right shoulder  -     X-Ray Shoulder Trauma 3 view Right; Future  -     Large Joint Aspiration/Injection: R subacromial bursa    Chronic right shoulder pain  -     X-Ray Shoulder Trauma 3 view Right; Future  -     Large Joint Aspiration/Injection: R subacromial bursa    Biceps tendinitis of right shoulder  -     X-Ray Shoulder Trauma 3 view Right; Future    Thoracic back pain, unspecified back pain laterality, unspecified chronicity  -     Ambulatory referral/consult to Urology; Future    Kidney stone  -     Ambulatory referral/consult to Urology;  Future    Fibromyalgia        Yolanda Betts comes in today with the above problems as above.  We have discussed risks and benefits of a corticosteroid injection right subacromial space.  She wishes to proceed.  She will monitor her sugars closely.  If she has large spikes in her blood sugar she will notify primary care.  I have reviewed previous labs which show hemoglobin A1c under good control over the last year.  More recent blood work shows blood glucose levels in the low 100s.  She does not have recent A1c, but based on recent blood glucose levels I am comfortable proceeding with injection today.    I have also given her a home exercise program to work on.  She is not able to do formal physical therapy due to time constraints and money.  However she has been instructed to do the home exercise program to 3 times a week.  I will see her back in 6 weeks.  If not better, we may refer to a shoulder specialist.    She has physical exam findings consistent with fibromyalgia.  I recommend she continue current treatment regimen and follow-up with Interventional Pain Medicine as instructed.  Additionally, have reviewed CT abdomen and pelvis from our [facility] about a week and half ago.  Shows some concerns for nephrolithiasis in addition to small hiatal hernia.  I have submitted referral orders for urology to evaluate kidney stones potential contributing source of back pain.  Additionally, she will follow-up with her gastroenterologist who did her gastric sleeve for worsening reflux and hiatal hernia as shown [facility].  Patient verbalizes understanding and agrees.    Follow up in about 6 weeks (around 3/21/2022).    The patient understands, chooses and consents to this plan and accepts all   the risks which include but are not limited to the risks mentioned above.     Disclaimer: This note was prepared using a voice recognition system and is likely to have sound alike errors within the text.

## 2022-02-07 NOTE — PROCEDURES
Large Joint Aspiration/Injection: R subacromial bursa    Date/Time: 2/7/2022 10:00 AM  Performed by: Mohini Wyman PA-C  Authorized by: Mohini Wyman PA-C     Consent Done?:  Yes (Verbal)  Indications:  Pain  Site marked: the procedure site was marked    Timeout: prior to procedure the correct patient, procedure, and site was verified    Prep: patient was prepped and draped in usual sterile fashion      Local anesthesia used?: Yes    Local anesthetic:  Lidocaine 2% without epinephrine and topical anesthetic  Anesthetic total (ml):  2      Details:  Needle Size:  22 G  Approach:  Posterior  Location:  Shoulder  Site:  R subacromial bursa  Medications:  40 mg triamcinolone acetonide 40 mg/mL  Patient tolerance:  Patient tolerated the procedure well with no immediate complications     After verbal consent was obtained for right shoulder injection, patient ID, site, and side were verified.  The  right  Shoulder was sterilly prepped in the standard fashion.  A 22-gauge needle was introduced into right subacromial space from the posterior portal approach without complication. The right shoulder was then injected with 40 mg lidocaine plain and 40mg kenalog.  A sterile bandaid was applied.  The patient was informed to apply an ice pack approximately 10min once arriving home and not to do anything strenuous for 24hours.  Elevation of glucose in diabetic patients was discussed.  She was instructed to call if there were any problems. The patient was discharged in stable condition.

## 2022-02-07 NOTE — TELEPHONE ENCOUNTER
Successfully contacted patient regarding message below. Patient states she visited the ED for pain (8/10) and was told she has kidney stones. CT shows small, non-obstructing stones. Patient is Medicaid patient, but is seen by other [facility] providers. Patient VU of appointment date, time, and location.   ----- Message from Gely Mathews sent at 2/7/2022  4:26 PM CST -----  Contact: self  Type:  Sooner Apoointment Request    Caller is requesting a sooner appointment.  Caller declined first available appointment listed below.  Caller will not accept being placed on the waitlist and is requesting a message be sent to doctor.  Name of Caller: Yolanda Betts    When is the first available appointment? 2023  Symptoms: kidney stones  Would the patient rather a call back or a response via [employer]?  callback  Best Call Back Number: [phone]  Additional Information:

## 2022-02-09 NOTE — PROGRESS NOTES
Your HbA1C is within normal range. Continue your current diabetic medication to maintain the good numbers.     Your cholesterol is worsening and the 10-year ASCVD risk score (Jose TRENT Jr., et al., 2013) is: 10.1% which is high.  Continue all your medications as ordered.

## 2022-02-11 ENCOUNTER — OFFICE VISIT (OUTPATIENT)
Dept: PSYCHIATRY | Facility: CLINIC | Age: 52
End: 2022-02-11
Payer: MEDICAID

## 2022-02-11 DIAGNOSIS — F31.9 BIPOLAR I DISORDER, CURRENT EPISODE DEPRESSED: Primary | ICD-10-CM

## 2022-02-11 DIAGNOSIS — F43.21 GRIEF: ICD-10-CM

## 2022-02-11 DIAGNOSIS — F41.1 GENERALIZED ANXIETY DISORDER: ICD-10-CM

## 2022-02-11 PROCEDURE — 3066F NEPHROPATHY DOC TX: CPT | Mod: AJ,HB,CPTII, | Performed by: SOCIAL WORKER

## 2022-02-11 PROCEDURE — 3072F PR LOW RISK FOR RETINOPATHY: ICD-10-PCS | Mod: AJ,HB,CPTII, | Performed by: SOCIAL WORKER

## 2022-02-11 PROCEDURE — 3061F PR NEG MICROALBUMINURIA RESULT DOCUMENTED/REVIEW: ICD-10-PCS | Mod: AJ,HB,CPTII, | Performed by: SOCIAL WORKER

## 2022-02-11 PROCEDURE — 90834 PR PSYCHOTHERAPY W/PATIENT, 45 MIN: ICD-10-PCS | Mod: AJ,HB,, | Performed by: SOCIAL WORKER

## 2022-02-11 PROCEDURE — 3044F PR MOST RECENT HEMOGLOBIN A1C LEVEL <7.0%: ICD-10-PCS | Mod: AJ,HB,CPTII, | Performed by: SOCIAL WORKER

## 2022-02-11 PROCEDURE — 90834 PSYTX W PT 45 MINUTES: CPT | Mod: AJ,HB,, | Performed by: SOCIAL WORKER

## 2022-02-11 PROCEDURE — 3066F PR DOCUMENTATION OF TREATMENT FOR NEPHROPATHY: ICD-10-PCS | Mod: AJ,HB,CPTII, | Performed by: SOCIAL WORKER

## 2022-02-11 PROCEDURE — 3044F HG A1C LEVEL LT 7.0%: CPT | Mod: AJ,HB,CPTII, | Performed by: SOCIAL WORKER

## 2022-02-11 PROCEDURE — 3061F NEG MICROALBUMINURIA REV: CPT | Mod: AJ,HB,CPTII, | Performed by: SOCIAL WORKER

## 2022-02-11 PROCEDURE — 3072F LOW RISK FOR RETINOPATHY: CPT | Mod: AJ,HB,CPTII, | Performed by: SOCIAL WORKER

## 2022-02-11 NOTE — PROGRESS NOTES
Individual Psychotherapy (PhD/LCSW)    2/11/2022    Site:  [place]         Therapeutic Intervention: Met with patient.  Outpatient - Supportive psychotherapy 45 min - CPT Code 59597    Chief complaint/reason for encounter: depression, mood swings, anxiety, interpersonal and grief       Interval history and content of current session: Pt shared that her sister's birthday was yesterday so they went to her graveside so her kids could release balloons and then went to eat as a family.  It had a celebratory feel and everyone was getting along well.  Pt grateful that they were able to do something to honor her.  Pt continues to feel trapped in her housing situation and in her relationship.  Accepting that he will never divorce his wife but remains fearful of being alone.  Still looking for housing that she can afford hoping to get out of [place] as the market is expensive and the area is so dangerous.  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  Has noticed that boyfriend's 12 year old is unable to read but does not want to get involved in all of that.  He also has really poor hygiene and poor comprehension of simple tasks.  Boyfriend does not believe in mental health so she can't suggest much to him about his child but she feels like there is something significant there.  She has not even shared with significant other than she is seeing psychiatrist or therapist.      Treatment plan:  · Target symptoms: depression, anxiety , grief  · Why chosen therapy is appropriate versus another modality: patient responds to this modality, evidence based practice  · Outcome monitoring methods: self-report, observation  · Therapeutic intervention type: insight oriented psychotherapy, supportive psychotherapy    Risk parameters:  Patient reports no suicidal ideation  Patient reports no homicidal ideation  Patient reports no  self-injurious behavior  Patient reports no violent behavior    Verbal deficits: None    Patient's response to intervention:  The patient's response to intervention is accepting.    Progress toward goals and other mental status changes:  The patient's progress toward goals is limited.    Diagnosis:     ICD-10-CM ICD-9-CM   1. Bipolar I disorder, current episode depressed  F31.9 296.50   2. Grief  F43.21 309.0   3. Generalized anxiety disorder  F41.1 300.02       Plan:  individual psychotherapy    Return to clinic: 1 week    Length of Service (minutes): 45     Liv Stout   02/11/2022   7:57 AM

## 2022-02-13 ENCOUNTER — PATIENT MESSAGE (OUTPATIENT)
Dept: ADMINISTRATIVE | Facility: OTHER | Age: 52
End: 2022-02-13
Payer: MEDICAID

## 2022-02-17 ENCOUNTER — OFFICE VISIT (OUTPATIENT)
Dept: UROLOGY | Facility: CLINIC | Age: 52
End: 2022-02-17
Payer: MEDICAID

## 2022-02-17 VITALS
DIASTOLIC BLOOD PRESSURE: 85 MMHG | WEIGHT: 181.69 LBS | RESPIRATION RATE: 16 BRPM | TEMPERATURE: 97 F | BODY MASS INDEX: 40.87 KG/M2 | SYSTOLIC BLOOD PRESSURE: 124 MMHG | HEART RATE: 96 BPM | HEIGHT: 56 IN

## 2022-02-17 DIAGNOSIS — N20.0 KIDNEY STONE: ICD-10-CM

## 2022-02-17 DIAGNOSIS — M54.6 THORACIC BACK PAIN, UNSPECIFIED BACK PAIN LATERALITY, UNSPECIFIED CHRONICITY: ICD-10-CM

## 2022-02-17 LAB
BILIRUB SERPL-MCNC: NORMAL MG/DL
BLOOD URINE, POC: NORMAL
CLARITY, POC UA: CLEAR
COLOR, POC UA: YELLOW
GLUCOSE UR QL STRIP: NORMAL
KETONES UR QL STRIP: NORMAL
LEUKOCYTE ESTERASE URINE, POC: NORMAL
NITRITE, POC UA: NORMAL
PH, POC UA: 5
PROTEIN, POC: NORMAL
SPECIFIC GRAVITY, POC UA: 1.01
UROBILINOGEN, POC UA: NORMAL

## 2022-02-17 PROCEDURE — 3061F NEG MICROALBUMINURIA REV: CPT | Mod: CPTII,,, | Performed by: NURSE PRACTITIONER

## 2022-02-17 PROCEDURE — 81002 URINALYSIS NONAUTO W/O SCOPE: CPT | Mod: PBBFAC | Performed by: NURSE PRACTITIONER

## 2022-02-17 PROCEDURE — 3061F PR NEG MICROALBUMINURIA RESULT DOCUMENTED/REVIEW: ICD-10-PCS | Mod: CPTII,,, | Performed by: NURSE PRACTITIONER

## 2022-02-17 PROCEDURE — 3079F PR MOST RECENT DIASTOLIC BLOOD PRESSURE 80-89 MM HG: ICD-10-PCS | Mod: CPTII,,, | Performed by: NURSE PRACTITIONER

## 2022-02-17 PROCEDURE — 99204 PR OFFICE/OUTPT VISIT, NEW, LEVL IV, 45-59 MIN: ICD-10-PCS | Mod: S$PBB,,, | Performed by: NURSE PRACTITIONER

## 2022-02-17 PROCEDURE — 3074F SYST BP LT 130 MM HG: CPT | Mod: CPTII,,, | Performed by: NURSE PRACTITIONER

## 2022-02-17 PROCEDURE — 3072F LOW RISK FOR RETINOPATHY: CPT | Mod: CPTII,,, | Performed by: NURSE PRACTITIONER

## 2022-02-17 PROCEDURE — 3074F PR MOST RECENT SYSTOLIC BLOOD PRESSURE < 130 MM HG: ICD-10-PCS | Mod: CPTII,,, | Performed by: NURSE PRACTITIONER

## 2022-02-17 PROCEDURE — 3008F PR BODY MASS INDEX (BMI) DOCUMENTED: ICD-10-PCS | Mod: CPTII,,, | Performed by: NURSE PRACTITIONER

## 2022-02-17 PROCEDURE — 99999 PR PBB SHADOW E&M-EST. PATIENT-LVL V: ICD-10-PCS | Mod: PBBFAC,,, | Performed by: NURSE PRACTITIONER

## 2022-02-17 PROCEDURE — 99204 OFFICE O/P NEW MOD 45 MIN: CPT | Mod: S$PBB,,, | Performed by: NURSE PRACTITIONER

## 2022-02-17 PROCEDURE — 3044F PR MOST RECENT HEMOGLOBIN A1C LEVEL <7.0%: ICD-10-PCS | Mod: CPTII,,, | Performed by: NURSE PRACTITIONER

## 2022-02-17 PROCEDURE — 3044F HG A1C LEVEL LT 7.0%: CPT | Mod: CPTII,,, | Performed by: NURSE PRACTITIONER

## 2022-02-17 PROCEDURE — 3072F PR LOW RISK FOR RETINOPATHY: ICD-10-PCS | Mod: CPTII,,, | Performed by: NURSE PRACTITIONER

## 2022-02-17 PROCEDURE — 3066F PR DOCUMENTATION OF TREATMENT FOR NEPHROPATHY: ICD-10-PCS | Mod: CPTII,,, | Performed by: NURSE PRACTITIONER

## 2022-02-17 PROCEDURE — 3066F NEPHROPATHY DOC TX: CPT | Mod: CPTII,,, | Performed by: NURSE PRACTITIONER

## 2022-02-17 PROCEDURE — 3079F DIAST BP 80-89 MM HG: CPT | Mod: CPTII,,, | Performed by: NURSE PRACTITIONER

## 2022-02-17 PROCEDURE — 3008F BODY MASS INDEX DOCD: CPT | Mod: CPTII,,, | Performed by: NURSE PRACTITIONER

## 2022-02-17 PROCEDURE — 1159F MED LIST DOCD IN RCRD: CPT | Mod: CPTII,,, | Performed by: NURSE PRACTITIONER

## 2022-02-17 PROCEDURE — 1159F PR MEDICATION LIST DOCUMENTED IN MEDICAL RECORD: ICD-10-PCS | Mod: CPTII,,, | Performed by: NURSE PRACTITIONER

## 2022-02-17 PROCEDURE — 99999 PR PBB SHADOW E&M-EST. PATIENT-LVL V: CPT | Mod: PBBFAC,,, | Performed by: NURSE PRACTITIONER

## 2022-02-17 PROCEDURE — 99215 OFFICE O/P EST HI 40 MIN: CPT | Mod: PBBFAC | Performed by: NURSE PRACTITIONER

## 2022-02-18 ENCOUNTER — TELEPHONE (OUTPATIENT)
Dept: PSYCHIATRY | Facility: CLINIC | Age: 52
End: 2022-02-18
Payer: MEDICAID

## 2022-02-18 ENCOUNTER — NURSE TRIAGE (OUTPATIENT)
Dept: ADMINISTRATIVE | Facility: CLINIC | Age: 52
End: 2022-02-18
Payer: MEDICAID

## 2022-02-18 ENCOUNTER — OFFICE VISIT (OUTPATIENT)
Dept: PSYCHIATRY | Facility: CLINIC | Age: 52
End: 2022-02-18
Payer: MEDICAID

## 2022-02-18 ENCOUNTER — PATIENT MESSAGE (OUTPATIENT)
Dept: ADMINISTRATIVE | Facility: OTHER | Age: 52
End: 2022-02-18
Payer: MEDICAID

## 2022-02-18 DIAGNOSIS — F31.9 BIPOLAR I DISORDER, CURRENT EPISODE DEPRESSED: Primary | ICD-10-CM

## 2022-02-18 DIAGNOSIS — F43.21 GRIEF: ICD-10-CM

## 2022-02-18 DIAGNOSIS — F41.1 GENERALIZED ANXIETY DISORDER: ICD-10-CM

## 2022-02-18 PROCEDURE — 90834 PR PSYCHOTHERAPY W/PATIENT, 45 MIN: ICD-10-PCS | Mod: AJ,HB,S$PBB, | Performed by: SOCIAL WORKER

## 2022-02-18 PROCEDURE — 3066F PR DOCUMENTATION OF TREATMENT FOR NEPHROPATHY: ICD-10-PCS | Mod: AJ,HB,S$PBB,CPTII | Performed by: SOCIAL WORKER

## 2022-02-18 PROCEDURE — 3044F PR MOST RECENT HEMOGLOBIN A1C LEVEL <7.0%: ICD-10-PCS | Mod: AJ,HB,S$PBB,CPTII | Performed by: SOCIAL WORKER

## 2022-02-18 PROCEDURE — 3072F LOW RISK FOR RETINOPATHY: CPT | Mod: AJ,HB,S$PBB,CPTII | Performed by: SOCIAL WORKER

## 2022-02-18 PROCEDURE — 3061F PR NEG MICROALBUMINURIA RESULT DOCUMENTED/REVIEW: ICD-10-PCS | Mod: AJ,HB,S$PBB,CPTII | Performed by: SOCIAL WORKER

## 2022-02-18 PROCEDURE — 3066F NEPHROPATHY DOC TX: CPT | Mod: AJ,HB,S$PBB,CPTII | Performed by: SOCIAL WORKER

## 2022-02-18 PROCEDURE — 3072F PR LOW RISK FOR RETINOPATHY: ICD-10-PCS | Mod: AJ,HB,S$PBB,CPTII | Performed by: SOCIAL WORKER

## 2022-02-18 PROCEDURE — 3061F NEG MICROALBUMINURIA REV: CPT | Mod: AJ,HB,S$PBB,CPTII | Performed by: SOCIAL WORKER

## 2022-02-18 PROCEDURE — 90834 PSYTX W PT 45 MINUTES: CPT | Mod: AJ,HB,S$PBB, | Performed by: SOCIAL WORKER

## 2022-02-18 PROCEDURE — 3044F HG A1C LEVEL LT 7.0%: CPT | Mod: AJ,HB,S$PBB,CPTII | Performed by: SOCIAL WORKER

## 2022-02-18 NOTE — PROGRESS NOTES
Individual Psychotherapy (PhD/LCSW)    2/18/2022    Site:  [place]         Therapeutic Intervention: Met with patient.  Outpatient - Behavior modifying psychotherapy 45 min - CPT code 02826    Chief complaint/reason for encounter: depression, anxiety and interpersonal     Interval history and content of current session: Pt continues to be frustrated by her living conditions.  Unable to find a home that she can afford.  Depressed by the chaos of their home.  Boyfriend is stretched in so many directions little to give to her.  Sister set up family photo shoot in response to death other their other sister.  Charging every individual $100 sitting fee.  Pt feels that was a lot to ask of everyone.  Pt making best of it and getting hair and make up done for the day.  Praised her self care.  Pt talked about how poor her health is even though she has lost 70 pounds.  Encouraged her to add some exercise to her daily routine.  Her work is sedentary and she is sedentary at home.  Talked about the positive effect exercise has on mind, body and sleep.  Ex  has a recurrence of his cancer.  Refused life saving surgery because it would require that he receive blood products.  Pt amazed by this decision as she no longer attends services with them but made this choice based on the beliefs of that [employer].  Pt aware of the negative impact current living situation is having on her mental health.      Treatment plan:  · Target symptoms: depression, anxiety , grief  · Why chosen therapy is appropriate versus another modality: relevant to diagnosis, patient responds to this modality, evidence based practice  · Outcome monitoring methods: self-report, observation  · Therapeutic intervention type: behavior modifying psychotherapy, supportive psychotherapy    Risk parameters:  Patient reports no suicidal ideation  Patient reports no homicidal ideation  Patient reports no self-injurious behavior  Patient reports no violent  behavior    Verbal deficits: None    Patient's response to intervention:  The patient's response to intervention is accepting.    Progress toward goals and other mental status changes:  The patient's progress toward goals is good.    Diagnosis:     ICD-10-CM ICD-9-CM   1. Bipolar I disorder, current episode depressed  F31.9 296.50   2. Grief  F43.21 309.0   3. Generalized anxiety disorder  F41.1 300.02       Plan:  individual psychotherapy    Return to clinic: 2 weeks    Length of Service (minutes): 45     Liv Stout   02/18/2022   8:07 AM

## 2022-02-18 NOTE — TELEPHONE ENCOUNTER
Reason for Disposition   Low blood glucose (< 70 mg/dL or 3.9 mmol/L) or symptomatic, now improved with Care Advice AND cause unknown    Additional Information   Negative: Unconscious or difficult to awaken   Negative: Seizure occurs   Negative: Acting confused (e.g., disoriented, slurred speech)   Negative: Very weak (can't stand)   Negative: Sounds like a life-threatening emergency to the triager   Negative: Vomiting and signs of dehydration (e.g., very dry mouth, lightheaded, dark urine, etc.)   Negative: Low blood sugar symptoms persist > 30 minutes AND using low blood sugar Care Advice   Negative: Low blood glucose (< 70 mg/dL or 3.9 mmol/L) persists > 30 minutes AND using low blood sugar Care Advice   Negative: Patient sounds very sick or weak to the triager   Negative: Diabetes medication overdose (e.g., insulin error) and triager unable to answer question   Negative: Caller has URGENT medication or insulin pump question and triager unable to answer question   Negative: Low blood sugar symptoms with no other adult present AND hasn't tried Care Advice   Negative: Low blood glucose (< 70 mg/dL or 3.9 mmol/L) with no other adult present AND hasn't tried Care Advice    Protocols used: DIABETES - LOW BLOOD SUGAR-A-OH  pt calling from work re BG 65. shaky,. Pt states she has nothing to eat. Pt states she is drinking OJ. rec to take snack now. Pt states she is eating a piece of candy and had glucose tablets. rec to take shack and recheck BG in 15 mins then if still 70 or less take another snack and recheck BG again in 15 mins. started atkins diets. Is that ok. rec to discuss with endo. Triage ended when pt received call from PCP office. Call back with questions.

## 2022-02-18 NOTE — TELEPHONE ENCOUNTER
Pt states its been low for some days. Has been doing the keto diet for a week and half. Hasnt been using the novolog . Taking 90 units of tojeuo at night. Sugar was 65 20 mins ago just started eatting salad. Adv pt to recheck after eatting. Pleasecall and  adv pt

## 2022-02-18 NOTE — TELEPHONE ENCOUNTER
Advise pt of provider's recommendations, she voiced understanding. Hypoglycemia protocol discussed.

## 2022-02-21 ENCOUNTER — PATIENT MESSAGE (OUTPATIENT)
Dept: UROLOGY | Facility: CLINIC | Age: 52
End: 2022-02-21
Payer: MEDICAID

## 2022-02-21 ENCOUNTER — HOSPITAL ENCOUNTER (OUTPATIENT)
Dept: RADIOLOGY | Facility: HOSPITAL | Age: 52
Discharge: HOME | End: 2022-02-21
Attending: NURSE PRACTITIONER
Payer: MEDICAID

## 2022-02-21 DIAGNOSIS — N20.0 KIDNEY STONE: ICD-10-CM

## 2022-02-21 PROCEDURE — 76770 US EXAM ABDO BACK WALL COMP: CPT | Mod: TC

## 2022-02-21 PROCEDURE — 76770 US RETROPERITONEAL COMPLETE: ICD-10-PCS | Mod: 26,,, | Performed by: RADIOLOGY

## 2022-02-21 PROCEDURE — 76770 US EXAM ABDO BACK WALL COMP: CPT | Mod: 26,,, | Performed by: RADIOLOGY

## 2022-02-21 PROCEDURE — 74018 RADEX ABDOMEN 1 VIEW: CPT | Mod: TC

## 2022-02-21 PROCEDURE — 74018 XR ABDOMEN AP 1 VIEW: ICD-10-PCS | Mod: 26,,, | Performed by: RADIOLOGY

## 2022-02-21 PROCEDURE — 74018 RADEX ABDOMEN 1 VIEW: CPT | Mod: 26,,, | Performed by: RADIOLOGY

## 2022-02-23 ENCOUNTER — OFFICE VISIT (OUTPATIENT)
Dept: PSYCHIATRY | Facility: CLINIC | Age: 52
End: 2022-02-23
Payer: MEDICAID

## 2022-02-23 DIAGNOSIS — F43.21 GRIEF: ICD-10-CM

## 2022-02-23 DIAGNOSIS — F41.1 GENERALIZED ANXIETY DISORDER: Chronic | ICD-10-CM

## 2022-02-23 DIAGNOSIS — F31.9 BIPOLAR I DISORDER, CURRENT EPISODE DEPRESSED: Primary | ICD-10-CM

## 2022-02-23 PROCEDURE — 3072F PR LOW RISK FOR RETINOPATHY: ICD-10-PCS | Mod: HP,HB,CPTII,95 | Performed by: PSYCHOLOGIST

## 2022-02-23 PROCEDURE — 3044F HG A1C LEVEL LT 7.0%: CPT | Mod: HP,HB,CPTII,95 | Performed by: PSYCHOLOGIST

## 2022-02-23 PROCEDURE — 99214 PR OFFICE/OUTPT VISIT, EST, LEVL IV, 30-39 MIN: ICD-10-PCS | Mod: HP,HB,95, | Performed by: PSYCHOLOGIST

## 2022-02-23 PROCEDURE — 99214 OFFICE O/P EST MOD 30 MIN: CPT | Mod: HP,HB,95, | Performed by: PSYCHOLOGIST

## 2022-02-23 PROCEDURE — 3061F NEG MICROALBUMINURIA REV: CPT | Mod: HP,HB,CPTII,95 | Performed by: PSYCHOLOGIST

## 2022-02-23 PROCEDURE — 3066F NEPHROPATHY DOC TX: CPT | Mod: HP,HB,CPTII,95 | Performed by: PSYCHOLOGIST

## 2022-02-23 PROCEDURE — 3066F PR DOCUMENTATION OF TREATMENT FOR NEPHROPATHY: ICD-10-PCS | Mod: HP,HB,CPTII,95 | Performed by: PSYCHOLOGIST

## 2022-02-23 PROCEDURE — 1159F MED LIST DOCD IN RCRD: CPT | Mod: HP,HB,CPTII,95 | Performed by: PSYCHOLOGIST

## 2022-02-23 PROCEDURE — 3061F PR NEG MICROALBUMINURIA RESULT DOCUMENTED/REVIEW: ICD-10-PCS | Mod: HP,HB,CPTII,95 | Performed by: PSYCHOLOGIST

## 2022-02-23 PROCEDURE — 3072F LOW RISK FOR RETINOPATHY: CPT | Mod: HP,HB,CPTII,95 | Performed by: PSYCHOLOGIST

## 2022-02-23 PROCEDURE — 1159F PR MEDICATION LIST DOCUMENTED IN MEDICAL RECORD: ICD-10-PCS | Mod: HP,HB,CPTII,95 | Performed by: PSYCHOLOGIST

## 2022-02-23 PROCEDURE — 3044F PR MOST RECENT HEMOGLOBIN A1C LEVEL <7.0%: ICD-10-PCS | Mod: HP,HB,CPTII,95 | Performed by: PSYCHOLOGIST

## 2022-02-23 RX ORDER — LURASIDONE HYDROCHLORIDE 60 MG/1
TABLET, FILM COATED ORAL
Qty: 30 TABLET | Refills: 0 | Status: SHIPPED | OUTPATIENT
Start: 2022-02-23 | End: 2022-03-23 | Stop reason: SDUPTHER

## 2022-02-23 RX ORDER — CLONAZEPAM 1 MG/1
1 TABLET ORAL DAILY
Qty: 30 TABLET | Refills: 1 | Status: SHIPPED | OUTPATIENT
Start: 2022-02-23 | End: 2022-03-23 | Stop reason: SDUPTHER

## 2022-02-23 RX ORDER — DULOXETIN HYDROCHLORIDE 30 MG/1
30 CAPSULE, DELAYED RELEASE ORAL NIGHTLY
Qty: 30 CAPSULE | Refills: 1 | Status: SHIPPED | OUTPATIENT
Start: 2022-02-23 | End: 2022-03-23 | Stop reason: SDUPTHER

## 2022-02-23 NOTE — PROGRESS NOTES
Outpatient Psychiatry Follow-Up Visit    2022    Timeframe: Corona Virus Outbreak     The patient location is: Patient's car/ Patient reported that his/her location at the time of this visit was in the [facility]     Visit type: Virtual visit with synchronous audio and video--We had to use audio via speakerphone and continue the video through the [employer] due to technical difficulties and audio quality. She was not able log back in for the video portion.    Each patient to whom he or she provides medical services by telemedicine is: (1) informed of the relationship between the physician and patient and the respective role of any other health care provider with respect to management of the patient; and (2) notified that he or she may decline to receive medical services by telemedicine and may withdraw from such care at any time.    I also informed patient of the following:   Mariam Young, PhD, MPAP:  [place] medical license number: [id number]    My contact info:  [facility] / 2nd Floor  [address] [place] [address]   Ph: [phone]    If technology issues, call office phone: Ph: [phone]  If crisis: Dial 911 or go to nearest Emergency Room (ER)  If questions related to privacy practices: contact [facility] Information Department: [phone]    Chief Complaint:  Yolanda Betts is a 51 y.o. female who presents today for follow-up of mood and anxiety.       Impressions/Plan from last visit: Yolanda attended her virtual visit--but we had audio problems. She reported that her sister April  on --of a blood clot. She  suddenly out of the blue. She reportedly had a COVID shot and went into the hospital complaining of her stomach hurting and ended up dying while in the hospital. She was 47 years old and had 5 children, with her youngest at 10 years old. Yolanda is depressed--not handling it well. She is sleeping okay but zones out.  She is working from 8 to 5 or so. She has been staying with her boyfriend. She has not been watching much TV--is talking to her boyfriend. Her other sister is angry with her because Yolanda won't keep the cat--Yolanda has not been to her own apartment because she does not want to be alone. She thinks that she wants the cat but can't take it right now. Yolanda does not know what will happen with the cat because her sister hung up on her. She said that she had decreased to 1 tab a day on clonazepam but now has increased back to 2 tabs a day because of the stress with her sister. Medicines--Cymbalta to 60 mg once daily, Latuda 80 mg, and clonazepam 1 mg bid (for the next couple of months).      since August 2021.    Interval History and Content of Current Session: Yolanda attended her virtual visit. We had audio problems and had to use the speakerphone for audio. 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 She said that she has been sleepy for a month; has been doing Keto for the last week. We reviewed her medicines and the multiple drug-drug interactions causing CNS depression, likely contributing to her sedation throughout the day. She has been on Savella reportedly for about 6 months--that may have some antidepressant benefit; we agreed to decrease her other medicines and monitor. She has been in therapy. Plan--decrease Cymbalta to 30 mg and Latuda to 60 mg; decrease Klonopin 1 mg daily.     since November 2021.        GAD7 2/18/2022 2/13/2022 12/20/2021   1. Feeling nervous, anxious, or on edge? 1 2 1   2. Not being able to stop or control worrying? 1 2 1   3. Worrying too much about different things? 1 2 1   4. Trouble relaxing? 1 3 2   5. Being so restless that it is hard to sit still? 0 0 2   6. Becoming easily annoyed or irritable? 1 1 1   7. Feeling afraid as if something awful might happen? 1 2 3   JOANN-7 Score 6 12 11      0-4 = Minimal anxiety  5-9 = Mild  "anxiety  10-14 = Moderate anxiety  15-21 = Severe anxiety       Review of Systems   · PSYCHIATRIC: Pertinant items are noted in the narrative.    Past Medical, Family and Social History: The patient's past medical, family and social history have been reviewed and updated as appropriate within the electronic medical record - see encounter notes.      Current Outpatient Medications:     amLODIPine (NORVASC) 10 MG tablet, Take 1 tablet (10 mg total) by mouth once daily., Disp: 30 tablet, Rfl: 11    amLODIPine (NORVASC) 5 MG tablet, Take 1 tablet (5 mg total) by mouth every evening., Disp: 30 tablet, Rfl: 11    aspirin (ECOTRIN) 81 MG EC tablet, Take 81 mg by mouth once daily., Disp: , Rfl:     BD INSULIN SYRINGE ULTRA-FINE 1/2 mL 30 gauge x 1/2" Syrg, , Disp: , Rfl: 0    blood sugar diagnostic (ONETOUCH ULTRA TEST) Strp, Check blood glucose 4 times daily as directed and as needed (dispense insurance preferred brand or patient choice), Disp: 200 each, Rfl: 5    blood sugar diagnostic Strp, 1 each by Misc.(Non-Drug; Combo Route) route 4 (four) times daily. [facility] Glucose Test Strips, Disp: 400 strip, Rfl: 3    blood-glucose meter Misc, Use as directed (Patient taking differently: Use as directed), Disp: 1 each, Rfl: 0    budesonide-formoterol 160-4.5 mcg (SYMBICORT) 160-4.5 mcg/actuation HFAA, Inhale 2 puffs into the lungs every 12 (twelve) hours. Controller : Use spacer, Disp: 10.2 g, Rfl: 11    clonazePAM (KLONOPIN) 1 MG tablet, Take 1 tablet (1 mg total) by mouth once daily., Disp: 30 tablet, Rfl: 1    cyclobenzaprine (FLEXERIL) 10 MG tablet, Take 0.5-1 tablets (5-10 mg total) by mouth 2 (two) times daily as needed for Muscle spasms. May cause drowsiness., Disp: 90 tablet, Rfl: 0    cycloSPORINE 0.05 % Drop, Place 1 drop into both eyes 2 (two) times daily., Disp: 5.5 mL, Rfl: 11    diclofenac sodium (VOLTAREN) 1 % Gel, Apply 2 grams topically 4 (four) times daily as needed., Disp: 5 each, Rfl: 0    " docusate sodium (COLACE) 100 MG capsule, Take 1 capsule (100 mg total) by mouth 2 (two) times daily., Disp: 180 capsule, Rfl: 0    DULoxetine (CYMBALTA) 30 MG capsule, Take 1 capsule (30 mg total) by mouth every evening., Disp: 30 capsule, Rfl: 1    fenofibrate (TRICOR) 54 MG tablet, Take 1 tablet (54 mg total) by mouth once daily., Disp: 30 tablet, Rfl: 3    fish oil-omega-3 fatty acids 300-1,000 mg capsule, Take 1 capsule by mouth once daily., Disp: , Rfl:     flash glucose scanning reader (FREESTYLE AMY 2 READER) [facility], Use as directed to check blood sugar, Disp: 1 each, Rfl: 1    frovatriptan (FROVA) 2.5 MG tablet, Take 1 tablet at onset of acute migraine. May take 1 more tablet 2 hours later if needed. (MAX 2 TABLET PER DAY. MAX 4 TABLETS PER WEEK.), Disp: 9 tablet, Rfl: 1    furosemide (LASIX) 20 MG tablet, Take 1 tablet (20 mg total) by mouth once daily., Disp: 30 tablet, Rfl: 11    insulin aspart U-100 (NOVOLOG FLEXPEN U-100 INSULIN) 100 unit/mL (3 mL) InPn pen, Inject 10 Units into the skin 3 (three) times daily before meals. Use based on sliding scale, Disp: 15 mL, Rfl: 3    insulin glargine U-300 conc (TOUJEO MAX U-300 SOLOSTAR) 300 unit/mL (3 mL) insulin pen, Inject 90 Units into the skin every evening., Disp: 4 pen, Rfl: 3    lancets (ONETOUCH DELICA PLUS LANCET) 30 gauge Misc, Use as directed 3 times daily (Patient taking differently: Use as directed 3 times daily), Disp: 100 each, Rfl: 11    LIDOcaine-prilocaine (EMLA) cream, Apply topically up to 4 times per day to affected area for pain relief (Patient taking differently: Apply topically up to 4 times per day to affected area for pain relief), Disp: 60 g, Rfl: 0    lurasidone (LATUDA) 60 mg Tab tablet, Take 1 tab by mouth daily with 350 calories, Disp: 30 tablet, Rfl: 0    magnesium oxide (MAG-OX) 400 mg (241.3 mg magnesium) tablet, Take 1 tablet (400 mg total) by mouth once daily., Disp: 90 tablet, Rfl: 3    metFORMIN (GLUCOPHAGE)  "1000 MG tablet, Take 1 tablet (1,000 mg total) by mouth 2 (two) times daily with meals., Disp: 180 tablet, Rfl: 0    milnacipran (SAVELLA) 25 mg Tab tablet, Take 1 tablet (25 mg total) by mouth 2 (two) times daily., Disp: 60 tablet, Rfl: 5    MULTIVIT,CALC,MINS/IRON/FOLIC (DAILY MULTIPLE FOR WOMEN ORAL), Take 1 tablet by mouth once daily., Disp: , Rfl:     pen needle, diabetic (BD ULTRA-FINE MINI PEN NEEDLE) 31 gauge x 3/16" Ndle, Use 5 a day, Disp: 200 each, Rfl: 3    pen needle, diabetic (BD ULTRA-FINE MINI PEN NEEDLE) 31 gauge x 3/16" Ndle, Use one needle 5 times a day (Patient taking differently: Use one needle 5 times a day), Disp: 200 each, Rfl: 3    polyethylene glycol (GLYCOLAX) 17 gram PwPk, Take 17 g by mouth 2 (two) times daily., Disp: 180 each, Rfl: 0    spironolactone (ALDACTONE) 25 MG tablet, Take 1 tablet (25 mg total) by mouth once daily., Disp: 30 tablet, Rfl: 6    valsartan (DIOVAN) 320 MG tablet, Take 1 tablet (320 mg total) by mouth once daily., Disp: 90 tablet, Rfl: 3    Compliance: yes    Side effects: somnolence    Risk Parameters:  Patient reports no suicidal ideation  Patient reports no homicidal ideation  Patient reports no self-injurious behavior  Patient reports no violent behavior    Exam (detailed: at least 9 elements; comprehensive: all 15 elements)   Constitutional  Vitals:  Most recent vital signs were reviewed.   Last 3 sets of Vitals    Vitals - 1 value per visit 2/7/2022 2/17/2022 2/17/2022   SYSTOLIC 131 - 124   DIASTOLIC 95 - 85   Pulse 95 - 96   Temp - - 97.3   Resp - - 16   SPO2 - - -   Weight (lb) 182.54 - 181.66   Weight (kg) 82.8 - 82.4   Height 56 - 56   BMI (Calculated) 40.9 - 40.8   VISIT REPORT - - -   Pain Score  - 0 -   Some recent data might be hidden          General:  age appropriate, casually dressed, neatly groomed     Musculoskeletal  Muscle Strength/Tone:  no tremor, no tic   Gait & Station:  video visit     Psychiatric  Speech:  no latency; no press " "  Behavior: wnl   Mood & Affect:  tired  congruent and appropriate   Thought Process:  normal and logical   Associations:  intact   Thought Content:  normal, no suicidality, no homicidality, delusions, or paranoia   Insight:  intact   Judgement: behavior is adequate to circumstances   Orientation:  grossly intact   Memory: intact for content of interview   Language: grossly intact   Attention Span & Concentration:  Grossly intact   Fund of Knowledge:  intact and appropriate to age and level of education     Assessment and Diagnosis   Status/Progress: Based on the examination today, the patient's problem(s) is/are inadequately controlled.  New problems have not been presented today.   Co-morbidities are complicating management of the primary condition.  There are no active rule-out diagnoses for this patient at this time.     General Impression:     Encounter Diagnoses   Name Primary?    Bipolar I disorder, current episode depressed Yes    Generalized anxiety disorder     Grief          Intervention/Counseling/Treatment Plan   · Medication Management: Discussed risks, benefits, and alternatives to treatment plan documented above with patient. I answered all patient questions related to this plan, and patient expressed understanding and agreement.   Decrease Cymbalta 30 mg; decrease Latuda 60 mg and switch to mornings with 350 calories; decrease Klonopin 1 mg daily  · continue therapy    Medication List with Changes/Refills   Current Medications    AMLODIPINE (NORVASC) 10 MG TABLET    Take 1 tablet (10 mg total) by mouth once daily.    AMLODIPINE (NORVASC) 5 MG TABLET    Take 1 tablet (5 mg total) by mouth every evening.    ASPIRIN (ECOTRIN) 81 MG EC TABLET    Take 81 mg by mouth once daily.    BD INSULIN SYRINGE ULTRA-FINE 1/2 ML 30 GAUGE X 1/2" SYRG        BLOOD SUGAR DIAGNOSTIC (ONETOUCH ULTRA TEST) STRP    Check blood glucose 4 times daily as directed and as needed (dispense insurance preferred brand or patient " choice)    BLOOD SUGAR DIAGNOSTIC STRP    1 each by Misc.(Non-Drug; Combo Route) route 4 (four) times daily. [facility] Glucose Test Strips    BLOOD-GLUCOSE METER MISC    Use as directed    BUDESONIDE-FORMOTEROL 160-4.5 MCG (SYMBICORT) 160-4.5 MCG/ACTUATION HFAA    Inhale 2 puffs into the lungs every 12 (twelve) hours. Controller : Use spacer    CYCLOBENZAPRINE (FLEXERIL) 10 MG TABLET    Take 0.5-1 tablets (5-10 mg total) by mouth 2 (two) times daily as needed for Muscle spasms. May cause drowsiness.    CYCLOSPORINE 0.05 % DROP    Place 1 drop into both eyes 2 (two) times daily.    DICLOFENAC SODIUM (VOLTAREN) 1 % GEL    Apply 2 grams topically 4 (four) times daily as needed.    DOCUSATE SODIUM (COLACE) 100 MG CAPSULE    Take 1 capsule (100 mg total) by mouth 2 (two) times daily.    FENOFIBRATE (TRICOR) 54 MG TABLET    Take 1 tablet (54 mg total) by mouth once daily.    FISH OIL-OMEGA-3 FATTY ACIDS 300-1,000 MG CAPSULE    Take 1 capsule by mouth once daily.    FLASH GLUCOSE SCANNING READER (FREESTYLE AMY 2 READER) [facility]    Use as directed to check blood sugar    FROVATRIPTAN (FROVA) 2.5 MG TABLET    Take 1 tablet at onset of acute migraine. May take 1 more tablet 2 hours later if needed. (MAX 2 TABLET PER DAY. MAX 4 TABLETS PER WEEK.)    FUROSEMIDE (LASIX) 20 MG TABLET    Take 1 tablet (20 mg total) by mouth once daily.    INSULIN ASPART U-100 (NOVOLOG FLEXPEN U-100 INSULIN) 100 UNIT/ML (3 ML) INPN PEN    Inject 10 Units into the skin 3 (three) times daily before meals. Use based on sliding scale    INSULIN GLARGINE U-300 CONC (TOUJEO MAX U-300 SOLOSTAR) 300 UNIT/ML (3 ML) INSULIN PEN    Inject 90 Units into the skin every evening.    LANCETS (ONETOUCH DELICA PLUS LANCET) 30 GAUGE MISC    Use as directed 3 times daily    LIDOCAINE-PRILOCAINE (EMLA) CREAM    Apply topically up to 4 times per day to affected area for pain relief    MAGNESIUM OXIDE (MAG-OX) 400 MG (241.3 MG MAGNESIUM) TABLET    Take 1 tablet (400 mg  "total) by mouth once daily.    METFORMIN (GLUCOPHAGE) 1000 MG TABLET    Take 1 tablet (1,000 mg total) by mouth 2 (two) times daily with meals.    MILNACIPRAN (SAVELLA) 25 MG TAB TABLET    Take 1 tablet (25 mg total) by mouth 2 (two) times daily.    MULTIVIT,CALC,MINS/IRON/FOLIC (DAILY MULTIPLE FOR WOMEN ORAL)    Take 1 tablet by mouth once daily.    PEN NEEDLE, DIABETIC (BD ULTRA-FINE MINI PEN NEEDLE) 31 GAUGE X 3/16" NDLE    Use 5 a day    PEN NEEDLE, DIABETIC (BD ULTRA-FINE MINI PEN NEEDLE) 31 GAUGE X 3/16" NDLE    Use one needle 5 times a day    POLYETHYLENE GLYCOL (GLYCOLAX) 17 GRAM PWPK    Take 17 g by mouth 2 (two) times daily.    SPIRONOLACTONE (ALDACTONE) 25 MG TABLET    Take 1 tablet (25 mg total) by mouth once daily.    VALSARTAN (DIOVAN) 320 MG TABLET    Take 1 tablet (320 mg total) by mouth once daily.   Changed and/or Refilled Medications    Modified Medication Previous Medication    CLONAZEPAM (KLONOPIN) 1 MG TABLET clonazePAM (KLONOPIN) 1 MG tablet       Take 1 tablet (1 mg total) by mouth once daily.    Take 1 tablet (1 mg total) by mouth 2 (two) times daily.    DULOXETINE (CYMBALTA) 30 MG CAPSULE DULoxetine (CYMBALTA) 60 MG capsule       Take 1 capsule (30 mg total) by mouth every evening.    Take 1 capsule (60 mg total) by mouth every evening.    LURASIDONE (LATUDA) 60 MG TAB TABLET lurasidone (LATUDA) 80 mg Tab tablet       Take 1 tab by mouth daily with 350 calories    Take 1 tablet (80 mg total) by mouth once daily.   Discontinued Medications    DULOXETINE (CYMBALTA) 30 MG CAPSULE    Take 1 capsule (30 mg total) by mouth every morning.    FLUCONAZOLE (DIFLUCAN) 150 MG TAB    Take one tablet and repeat dose in 3 days    MUPIROCIN (BACTROBAN) 2 % OINTMENT    Apply topically 2 (two) times daily.    NYSTATIN (MYCOSTATIN) CREAM    Apply topically 2 (two) times daily.        Return to Clinic: 1 month    Time spent with pt including note preparation: 31 minutes       Mariam Young, PhD, MP  Advanced " Practice Medical Psychologist  [facility]  [address]  [place], [place] [address] [phone]   [phone] fax

## 2022-02-23 NOTE — PATIENT INSTRUCTIONS
"[facility] DEPARTMENT OF PSYCHIATRY   PATIENT INFORMATION    We appreciate the opportunity to participate in your medical care and hope the following protocols will make it easier for you to receive quality treatment in our department.    PUNCTUALITY: Your appointment is scheduled for a fixed amount of time, reserved especially for you.  To get the benefit of your appointment, please arrive at least 15 minutes early to allow time for traffic, parking and registration.  Should you arrive more than 15 minutes late to your appointment, you will be rescheduled in order to assure your clinician has adequate time to assess you and provide helpful care.      APPOINTMENTS: Appointments are made by the nursing/front office staff or through the patient portal. Providers do not have access  to schedule appointments. Walk in appointments are not available. FOR EMERGENCIES, PLEASE GO THE CLOSEST EMERGENCY ROOM.    CANCELLATION/MISSED APPOINTMENTS:   In order to receive quality care, all appointments must be kept.  If you are unable to keep an appointment, please reschedule at least 3 days prior if possible. Late cancellations (within 24 hours of the appointment) and repeated no-show appointments may result in dismissal from the clinic. After two no show/late cancellation visits, you will receive a notice letter, alerting you to keep visits to prevent department dismissal. If another visit is missed after receipt of the notice, you will be discharged from the clinic. This policy is in effect to allow for other individuals on a long waiting list to be seen as soon as possible. Unlike other branches of medicine where several individuals can be scheduled in a 30 minute time slot, only one individual can be scheduled in any time slot in Psychiatry.     MESSAGES: For simple questions/concerns, you may contact your individual providers electronically through the "[facility]" portal or by calling [phone] " with messages relayed via office staff. If relevant, include pharmacy name and phone number, date of last visit and next scheduled visit, phone number where you can be reached throughout the day, and whether leaving a voicemail or message on an answering machine is acceptable. Messages will be returned by the Medical Assistant or Office Staff after your provider has reviewed the message.  Please allow 24 hours for a returned message before leaving another message. Messages will be checked each workday (Monday through Friday) during office hours (8:00 a.m. and 5:00 p.m.) and returned at most within one business day.  You may leave a non-urgent message after hours. Note that psychotherapy and medication management are not appropriate by telephone or the patient portal.    PRESCRIPTION REFILLS:  Please communicate with your prescriber about any refills you need during your appointment. You may also request refills through the [facility] portal (preferred) or by calling the clinic. Prescriptions will be filled during office hours.     Please do not wait until you are completely out of medication to request refills. Same day refills are not always possible. Patients may experience symptoms of withdrawal if they run out of medications. The patient assumes all responsibility when there is an issue with non-compliance with follow-up appointments and medications.  Some medications are controlled and regulated by the FDA and [place]. Some of these medications can not be refilled before 30 days and require a face to face appointment.     PAPERWORK REQUESTS: If you have any forms or letters that need to be completed by your doctor, please present these at the beginning of the appointment to ensure that information needed to complete them is obtained during the office visit. Paperwork will be returned within 7-10 business days. Staff will call you to  the paperwork when completed.    SPECIAL EVALUATIONS: Please note that our  "department is treatment-focused. As such, we focus on treatment-oriented evaluations and do not perform specialty or "forensic" evaluations. Examples are listed below.    Disability: We do not do disability evaluations.  Please contact Social Security Administration for evaluations and determinations. You will then sign releases allowing for records from your treatment providers to be forwarded to Social Security Administration to use in their evaluation.  Gun Permit: We do not offer Sound Judgment Evaluations or assessments leading to gun ownership, nor do we fill out or file paperwork relevant to owning, concealing or purchasing a firearm.  Emotional Support     Animals (LUCIO): We do not provide documentation, including letters, to aid in the acclamation that an Emotional Support Animal is required. Note that ESAs are not trained to perform tasks or recognize particular signs or symptoms. Rather, they are distinguished by the close, emotional, and supportive bond between the animal and the owner.       SAMPLES: We do not provide samples of any medications. If you have financial difficulties and are on a limited income, you may qualify for Patient Assistance Programs from various pharmaceutical companies. This will require that you complete paperwork with your financial information, but this does not guarantee that the company will approve the application. Alternative medication options can be discussed.    REFERRALS/COORDINATION: You will be referred to other providers if we feel unable to adequately diagnose or treat your particular condition, or if collaboration with another provider would allow for better management of your condition.     This document is for information purposes only. Please refer to the full disclaimer and copyright statement available at http://www.cci.health.wa.gov.au regarding the information from this website before making use of such information.  See website www.cci.health.wa.gov.au " for more handouts and resources.    What is Sleep Hygiene?  Sleep hygiene is the term used to describe good sleep habits. Considerable research has gone into developing a set of guidelines and tips which are designed to enhance good sleeping, and there is much evidence to suggest that these strategies can provide long-term solutions to sleep difficulties. There are many medications which are used to treat insomnia,  but these tend to be only effective in the short-term. Ongoing use of sleeping pills may lead to dependence and interfere with developing good sleep habits independent of medication,  thereby prolonging sleep difficulties. Talk to your health professional about what is right for you, but we recommend good sleep hygiene as an important part of treating insomnia,  either with other strategies such as medication or cognitive therapy or alone.    Sleep Hygiene Tips  1) Get regular. One of the best ways to train your body to sleep well is to go to bed and get up at more or less the same time every day, even on weekends and days off! This regular rhythm will make you feel better and will give your body something to work from.  2) Sleep when sleepy. Only try to sleep when you actually feel tired or sleepy, rather than spending too much time awake in bed.  3) Get up & try again. If you havent been able to get to sleep after about 20 minutes or more, get up and do something calming or boring until you feel sleepy, then return to bed and try again. Sit quietly on the couch with the lights off (bright light will tell your brain that it is time to wake up), or read something boring like the phone book. Avoid doing anything that is too stimulating or interesting, as this will wake you up even more.  4) Avoid caffeine & nicotine. It is best to avoid consuming any caffeine (in coffee, tea, cola drinks, chocolate, and some medications) or nicotine (cigarettes) for at least 4-6 hours before going to bed. These  substances act as stimulants and interfere with the ability to fall asleep   5) Avoid alcohol. It is also best to avoid alcohol for at least 4-6 hours before going to bed. Many people believe that alcohol is relaxing and helps them to get to sleep at first, but it actually interrupts the quality of sleep.  6) Bed is for sleeping. Try not to use your bed for anything other than sleeping and sex, so that your body comes to associate bed with sleep. If you use bed as a place to watch TV, eat, read, work on your laptop, pay bills, and other things, your body will not learn this connection.  7) No naps. It is best to avoid taking naps during the day, to make sure that you are tired at bedtime. If you cant make it through the day without a nap, make sure it is for less than an hour and before 3pm.  8) Sleep rituals. You can develop your own rituals of things to remind your body that it is time to sleep - some people find it useful to do relaxing stretches or breathing exercises for 15 minutes before bed each night, or sit calmly with a cup of caffeine-free tea.  9) Bathtime. Having a hot bath 1-2 hours before bedtime can be useful, as it will raise your body temperature, causing you to feel sleepy as your body temperature drops again. Research shows that sleepiness is associated with a drop in body temperature.  10) No clock-watching. Many people who struggle with sleep tend to watch the clock too much. Frequently checking the clock during the night can wake you up (especially if you turn  on the light to read the time) and reinforces negative thoughts such as Oh no, look how late it is, Ill never get to sleep or its so early, I have only slept for 5 hours, this is  terrible.  11) Use a sleep diary. This worksheet can be a useful way of making sure you have the right facts about your sleep, rather than making assumptions. Because a diary involves watching  the clock (see point 10) it is a good idea to only use it  for two weeks to get an idea of what is going and then perhaps two months down the track to see how you are progressing.  12) Exercise. Regular exercise is a good idea to help with good sleep, but try not to do strenuous exercise in the 4 hours before bedtime. Morning walks are a great way to start the day feeling refreshed!  13) Eat right. A healthy, balanced diet will help you to sleep well, but timing is important. Some people find that a very empty stomach at bedtime is distracting, so it can be useful  to have a light snack, but a heavy meal soon before bed can also interrupt sleep. Some people recommend a warm glass of milk, which contains tryptophan, which acts as a natural  sleep inducer.  14) The right space. It is very important that your bed and bedroom are quiet and comfortable for sleeping. A cooler room with enough blankets to stay warm is best, and make sure you have curtains or an eyemask to block out early morning light and earplugs if there is noise outside your room.  15) Keep daytime routine the same. Even if you have a bad night sleep and are tired it is important that you try to keep your daytime activities the same as you had planned. That is,  dont avoid activities because you feel tired. This can reinforce the insomnia.     Call In if problems  Call Report Side Effects   Encouraged to follow up with primary care / Gen Med MD for continued monitoring of general health and wellness  Call 051 Or go to ER if Acute Concerns (especially if any thoughts of harm to self or other)   Decrease Cymbalta 30 mg; decrease Latuda 60 mg and switch to mornings with 350 calories; decrease Klonopin 1 mg daily       Mariam Young, PhD, MP  Advanced Practice Medical Psychologist  [facility]  [id number] [place] [address].  CHRI[place] [address]  [phone]   [phone] fax

## 2022-02-24 ENCOUNTER — TELEPHONE (OUTPATIENT)
Dept: PODIATRY | Facility: CLINIC | Age: 52
End: 2022-02-24
Payer: MEDICAID

## 2022-02-24 NOTE — TELEPHONE ENCOUNTER
Spoke with patient regarding sooner podiatry appt. Patient states she wants an appt sooner than scheduled appt on 3/10/22 with Dr. Sweeney only. I informed patient that first available is 3/14. Patient declined. States she will keep scheduled appt on 3/10. Call ended pleasantly.     ----- Message from Sangita Beaulieu sent at 2/24/2022  7:51 AM CST -----  Regarding: appt access  Contact: Pt  Type:  Sooner Apoointment Request    Caller is requesting a sooner appointment.  Caller declined first available appointment listed below.  Caller will not accept being placed on the waitlist and is requesting a message be sent to doctor.  Name of Caller: Yolanda Alpesh   When is the first available appointment? 03/10/22  Symptoms: bilateral foot pain  Would the patient rather a call back or a response via [facility]? Call back   Best Call Back Number: [phone]  Additional Information:  Pt states that this is her 2nd call.

## 2022-02-28 ENCOUNTER — OFFICE VISIT (OUTPATIENT)
Dept: PRIMARY CARE CLINIC | Facility: CLINIC | Age: 52
End: 2022-02-28
Payer: MEDICAID

## 2022-02-28 VITALS
OXYGEN SATURATION: 99 % | HEIGHT: 56 IN | DIASTOLIC BLOOD PRESSURE: 75 MMHG | SYSTOLIC BLOOD PRESSURE: 131 MMHG | HEART RATE: 101 BPM | WEIGHT: 178.63 LBS | BODY MASS INDEX: 40.19 KG/M2 | TEMPERATURE: 98 F

## 2022-02-28 DIAGNOSIS — E11.42 DM TYPE 2 WITH DIABETIC PERIPHERAL NEUROPATHY: ICD-10-CM

## 2022-02-28 DIAGNOSIS — G47.20 CIRCADIAN RHYTHM DISORDER: ICD-10-CM

## 2022-02-28 DIAGNOSIS — E78.2 MIXED HYPERLIPIDEMIA: ICD-10-CM

## 2022-02-28 DIAGNOSIS — I10 ESSENTIAL HYPERTENSION: ICD-10-CM

## 2022-02-28 DIAGNOSIS — E11.9 ENCOUNTER FOR DIABETIC FOOT EXAM: ICD-10-CM

## 2022-02-28 DIAGNOSIS — Z90.3 H/O GASTRIC SLEEVE: ICD-10-CM

## 2022-02-28 DIAGNOSIS — E66.9 NOCTURNAL HYPOXEMIA DUE TO OBESITY: Chronic | ICD-10-CM

## 2022-02-28 DIAGNOSIS — G47.36 NOCTURNAL HYPOXEMIA DUE TO OBESITY: Chronic | ICD-10-CM

## 2022-02-28 DIAGNOSIS — G47.10 HYPERSOMNOLENCE DISORDER: Primary | ICD-10-CM

## 2022-02-28 DIAGNOSIS — E66.2 OBESITY HYPOVENTILATION SYNDROME: ICD-10-CM

## 2022-02-28 PROBLEM — F19.982: Status: ACTIVE | Noted: 2022-02-28

## 2022-02-28 PROCEDURE — 1159F PR MEDICATION LIST DOCUMENTED IN MEDICAL RECORD: ICD-10-PCS | Mod: CPTII,,, | Performed by: FAMILY MEDICINE

## 2022-02-28 PROCEDURE — 3072F LOW RISK FOR RETINOPATHY: CPT | Mod: CPTII,,, | Performed by: FAMILY MEDICINE

## 2022-02-28 PROCEDURE — 3044F HG A1C LEVEL LT 7.0%: CPT | Mod: CPTII,,, | Performed by: FAMILY MEDICINE

## 2022-02-28 PROCEDURE — 3061F PR NEG MICROALBUMINURIA RESULT DOCUMENTED/REVIEW: ICD-10-PCS | Mod: CPTII,,, | Performed by: FAMILY MEDICINE

## 2022-02-28 PROCEDURE — 3075F SYST BP GE 130 - 139MM HG: CPT | Mod: CPTII,,, | Performed by: FAMILY MEDICINE

## 2022-02-28 PROCEDURE — 99215 OFFICE O/P EST HI 40 MIN: CPT | Mod: PBBFAC,PN | Performed by: FAMILY MEDICINE

## 2022-02-28 PROCEDURE — 99999 PR PBB SHADOW E&M-EST. PATIENT-LVL V: ICD-10-PCS | Mod: PBBFAC,,, | Performed by: FAMILY MEDICINE

## 2022-02-28 PROCEDURE — 3075F PR MOST RECENT SYSTOLIC BLOOD PRESS GE 130-139MM HG: ICD-10-PCS | Mod: CPTII,,, | Performed by: FAMILY MEDICINE

## 2022-02-28 PROCEDURE — 3008F PR BODY MASS INDEX (BMI) DOCUMENTED: ICD-10-PCS | Mod: CPTII,,, | Performed by: FAMILY MEDICINE

## 2022-02-28 PROCEDURE — 99214 OFFICE O/P EST MOD 30 MIN: CPT | Mod: S$PBB,,, | Performed by: FAMILY MEDICINE

## 2022-02-28 PROCEDURE — 3078F DIAST BP <80 MM HG: CPT | Mod: CPTII,,, | Performed by: FAMILY MEDICINE

## 2022-02-28 PROCEDURE — 3066F PR DOCUMENTATION OF TREATMENT FOR NEPHROPATHY: ICD-10-PCS | Mod: CPTII,,, | Performed by: FAMILY MEDICINE

## 2022-02-28 PROCEDURE — 3066F NEPHROPATHY DOC TX: CPT | Mod: CPTII,,, | Performed by: FAMILY MEDICINE

## 2022-02-28 PROCEDURE — 99999 PR PBB SHADOW E&M-EST. PATIENT-LVL V: CPT | Mod: PBBFAC,,, | Performed by: FAMILY MEDICINE

## 2022-02-28 PROCEDURE — 3008F BODY MASS INDEX DOCD: CPT | Mod: CPTII,,, | Performed by: FAMILY MEDICINE

## 2022-02-28 PROCEDURE — 3072F PR LOW RISK FOR RETINOPATHY: ICD-10-PCS | Mod: CPTII,,, | Performed by: FAMILY MEDICINE

## 2022-02-28 PROCEDURE — 99214 PR OFFICE/OUTPT VISIT, EST, LEVL IV, 30-39 MIN: ICD-10-PCS | Mod: S$PBB,,, | Performed by: FAMILY MEDICINE

## 2022-02-28 PROCEDURE — 3061F NEG MICROALBUMINURIA REV: CPT | Mod: CPTII,,, | Performed by: FAMILY MEDICINE

## 2022-02-28 PROCEDURE — 3078F PR MOST RECENT DIASTOLIC BLOOD PRESSURE < 80 MM HG: ICD-10-PCS | Mod: CPTII,,, | Performed by: FAMILY MEDICINE

## 2022-02-28 PROCEDURE — 1159F MED LIST DOCD IN RCRD: CPT | Mod: CPTII,,, | Performed by: FAMILY MEDICINE

## 2022-02-28 PROCEDURE — 3044F PR MOST RECENT HEMOGLOBIN A1C LEVEL <7.0%: ICD-10-PCS | Mod: CPTII,,, | Performed by: FAMILY MEDICINE

## 2022-02-28 NOTE — PROGRESS NOTES
Subjective:       Patient ID: Yolanda Betts is a 51 y.o. female.    Chief Complaint: Sleep disturbance and HTN and DM II      HPI   History of Present Illness:   Yolanda Betts 51 y.o. female presents today with   Sleep D/O x 2 years. Sleeping all the time both daytime and nighttime. Goes to bed between 8 & 10pm.  Asking for excuse for Friday and Sat because she was too sleepy to go to work and slept all day.   She has had sleep study and was diagnosed with hypoventilation due to Obesity with ALEN, requiring O2 for sleep and she is using it.  Since then she has lost 72 lbs after getting gastric sleeve in 06/21.   HTN: controlled, numerous med including amlodipine 10 in the morning and 5 at night per her cardiologist. Also with self reported history of palpitations and does not want any stimulant.  She has Bipolar I depression but reports that sxs is controlled on Latuda and Cymbalta and klonopin at bedtime.   Thyroid function was normal last year.    Past Medical History:   Diagnosis Date    Abnormal Pap smear of cervix     HPV genital warts    Anemia     Anxiety     Arthritis     Asthma 10/11/2016    Bipolar 1 disorder     Diabetes mellitus, type 2     Dyslipidemia associated with type 2 diabetes mellitus 5/13/2019    General anesthetics causing adverse effect in therapeutic use     Genital warts     GERD (gastroesophageal reflux disease)     Herpes simplex virus (HSV) infection     Hyperlipidemia     Hypertension     Hypertension complicating diabetes 5/5/2019    Migraine with aura and without status migrainosus, not intractable 3/21/2016    Mild persistent asthma without complication 10/11/2016    Morbid obesity with body mass index (BMI) of 45.0 to 49.9 in adult 8/3/2017    Obstructive sleep apnea     ALEN (obstructive sleep apnea)     2L per N/C q HS    Schizoaffective disorder, bipolar type 4/25/2019    Seasonal allergic rhinitis due to pollen 5/13/2019    Type 2 diabetes  "mellitus with hyperglycemia, with long-term current use of insulin 12/21/2017    Type 2 diabetes mellitus with hyperglycemia, without long-term current use of insulin 12/21/2017     Family History   Problem Relation Age of Onset    Diabetes Mother     Hyperlipidemia Mother     Hypertension Mother     Asthma Mother     COPD Mother     Glaucoma Mother     Thyroid disease Mother     Anesthesia problems Mother         "almost had a cardiac arrest" , blood clots    Hypertension Father     Hyperlipidemia Father     Cancer Father         Brain, lung, liver, kidney    Heart disease Maternal Grandmother     Hyperlipidemia Maternal Grandmother     Hypertension Maternal Grandmother     Cataracts Maternal Grandmother     Diabetes Maternal Grandmother     Heart disease Maternal Grandfather     Hyperlipidemia Maternal Grandfather     Hypertension Maternal Grandfather     Glaucoma Maternal Grandfather     Cancer Maternal Grandfather     Cataracts Maternal Grandfather     Macular degeneration Maternal Grandfather     Diabetes Maternal Grandfather     Heart disease Paternal Grandmother     Hyperlipidemia Paternal Grandmother     Hypertension Paternal Grandmother     Cataracts Paternal Grandmother     Heart disease Paternal Grandfather     Hyperlipidemia Paternal Grandfather     Hypertension Paternal Grandfather     Cataracts Paternal Grandfather     Breast cancer Maternal Cousin     Breast cancer Maternal Cousin     Breast cancer Maternal Cousin     Breast cancer Maternal Cousin      Social History     Socioeconomic History    Marital status: Single   Tobacco Use    Smoking status: Never Smoker    Smokeless tobacco: Never Used   Substance and Sexual Activity    Alcohol use: Yes     Alcohol/week: 0.0 standard drinks     Comment: socially  No alcohol 72h prior to sx    Drug use: No    Sexual activity: Yes     Partners: Male     Birth control/protection: Surgical     Comment: hyst   Social " History Narrative    Long-term care nurse     Outpatient Encounter Medications as of 2/28/2022   Medication Sig Dispense Refill    amLODIPine (NORVASC) 10 MG tablet Take 1 tablet (10 mg total) by mouth once daily. 30 tablet 11    amLODIPine (NORVASC) 5 MG tablet Take 1 tablet (5 mg total) by mouth every evening. 30 tablet 11    aspirin (ECOTRIN) 81 MG EC tablet Take 81 mg by mouth once daily.      blood sugar diagnostic (ONETOUCH ULTRA TEST) Strp Check blood glucose 4 times daily as directed and as needed (dispense insurance preferred brand or patient choice) 200 each 5    blood sugar diagnostic Strp 1 each by Misc.(Non-Drug; Combo Route) route 4 (four) times daily. [facility] Glucose Test Strips 400 strip 3    blood-glucose meter Misc Use as directed (Patient taking differently: Use as directed) 1 each 0    budesonide-formoterol 160-4.5 mcg (SYMBICORT) 160-4.5 mcg/actuation HFAA Inhale 2 puffs into the lungs every 12 (twelve) hours. Controller : Use spacer 10.2 g 11    clonazePAM (KLONOPIN) 1 MG tablet Take 1 tablet (1 mg total) by mouth once daily. 30 tablet 1    cyclobenzaprine (FLEXERIL) 10 MG tablet Take 0.5-1 tablets (5-10 mg total) by mouth 2 (two) times daily as needed for Muscle spasms. May cause drowsiness. 90 tablet 0    cycloSPORINE 0.05 % Drop Place 1 drop into both eyes 2 (two) times daily. 5.5 mL 11    diclofenac sodium (VOLTAREN) 1 % Gel Apply 2 grams topically 4 (four) times daily as needed. 5 each 0    docusate sodium (COLACE) 100 MG capsule Take 1 capsule (100 mg total) by mouth 2 (two) times daily. 180 capsule 0    DULoxetine (CYMBALTA) 30 MG capsule Take 1 capsule (30 mg total) by mouth every evening. 30 capsule 1    fenofibrate (TRICOR) 54 MG tablet Take 1 tablet (54 mg total) by mouth once daily. 30 tablet 3    fish oil-omega-3 fatty acids 300-1,000 mg capsule Take 1 capsule by mouth once daily.      flash glucose scanning reader (Zursh AMY 2 READER) Misc Use as directed to  "check blood sugar 1 each 1    frovatriptan (FROVA) 2.5 MG tablet Take 1 tablet at onset of acute migraine. May take 1 more tablet 2 hours later if needed. (MAX 2 TABLET PER DAY. MAX 4 TABLETS PER WEEK.) 9 tablet 1    furosemide (LASIX) 20 MG tablet Take 1 tablet (20 mg total) by mouth once daily. 30 tablet 11    insulin aspart U-100 (NOVOLOG FLEXPEN U-100 INSULIN) 100 unit/mL (3 mL) InPn pen Inject 10 Units into the skin 3 (three) times daily before meals. Use based on sliding scale 15 mL 3    insulin glargine U-300 conc (TOUJEO MAX U-300 SOLOSTAR) 300 unit/mL (3 mL) insulin pen Inject 90 Units into the skin every evening. 4 pen 3    lancets (ONETOUCH DELICA PLUS LANCET) 30 gauge Misc Use as directed 3 times daily (Patient taking differently: Use as directed 3 times daily) 100 each 11    LIDOcaine-prilocaine (EMLA) cream Apply topically up to 4 times per day to affected area for pain relief (Patient taking differently: Apply topically up to 4 times per day to affected area for pain relief) 60 g 0    lurasidone (LATUDA) 60 mg Tab tablet Take 1 tab by mouth daily with 350 calories 30 tablet 0    magnesium oxide (MAG-OX) 400 mg (241.3 mg magnesium) tablet Take 1 tablet (400 mg total) by mouth once daily. 90 tablet 3    metFORMIN (GLUCOPHAGE) 1000 MG tablet Take 1 tablet (1,000 mg total) by mouth 2 (two) times daily with meals. 180 tablet 0    milnacipran (SAVELLA) 25 mg Tab tablet Take 1 tablet (25 mg total) by mouth 2 (two) times daily. 60 tablet 5    MULTIVIT,CALC,MINS/IRON/FOLIC (DAILY MULTIPLE FOR WOMEN ORAL) Take 1 tablet by mouth once daily.      polyethylene glycol (GLYCOLAX) 17 gram PwPk Take 17 g by mouth 2 (two) times daily. 180 each 0    spironolactone (ALDACTONE) 25 MG tablet Take 1 tablet (25 mg total) by mouth once daily. 30 tablet 6    valsartan (DIOVAN) 320 MG tablet Take 1 tablet (320 mg total) by mouth once daily. 90 tablet 3    BD INSULIN SYRINGE ULTRA-FINE 1/2 mL 30 gauge x 1/2" Syrg  " " 0    pen needle, diabetic (BD ULTRA-FINE MINI PEN NEEDLE) 31 gauge x 3/16" Ndle Use 5 a day 200 each 3    pen needle, diabetic (BD ULTRA-FINE MINI PEN NEEDLE) 31 gauge x 3/16" Ndle Use one needle 5 times a day (Patient taking differently: Use one needle 5 times a day) 200 each 3    [DISCONTINUED] glucagon, human recombinant, (GLUCAGON EMERGENCY KIT, HUMAN,) 1 mg SolR Inject 1 mg into the muscle as needed. Emergency use 1 each 1     No facility-administered encounter medications on file as of 2/28/2022.       Review of Systems   Constitutional: Negative for chills and fever.   HENT: Negative for congestion and facial swelling.    Eyes: Negative for discharge and itching.   Respiratory: Negative for cough and wheezing.    Cardiovascular: Negative for chest pain and palpitations.   Gastrointestinal: Negative for abdominal pain, nausea and vomiting.   Endocrine: Negative for cold intolerance and heat intolerance.   Genitourinary: Negative for dysuria and flank pain.   Musculoskeletal: Negative for myalgias and neck stiffness.   Skin: Negative for pallor and wound.   Neurological: Negative for facial asymmetry and weakness.   Psychiatric/Behavioral: Negative for agitation and suicidal ideas.       Objective:      /75 (BP Location: Left arm, Patient Position: Sitting, BP Method: Medium (Automatic))   Pulse 101   Temp 98.1 °F (36.7 °C) (Temporal)   Ht 4' 8" (1.422 m)   Wt 81 kg (178 lb 9.6 oz)   SpO2 99%   BMI 40.04 kg/m²   Physical Exam  Vitals and nursing note reviewed.   Constitutional:       General: She is not in acute distress.     Appearance: She is well-developed.      Comments: Still sleepy.   HENT:      Head: Normocephalic and atraumatic.      Right Ear: External ear normal.      Left Ear: External ear normal.   Eyes:      Conjunctiva/sclera: Conjunctivae normal.   Neck:      Thyroid: No thyromegaly.   Cardiovascular:      Rate and Rhythm: Normal rate and regular rhythm.      Pulses: Normal " pulses.      Heart sounds: Normal heart sounds.   Pulmonary:      Effort: Pulmonary effort is normal. No respiratory distress.      Breath sounds: Normal breath sounds.   Abdominal:      General: Bowel sounds are normal. There is no distension.      Palpations: Abdomen is soft.      Tenderness: There is no abdominal tenderness.   Genitourinary:     Comments: deferred  Musculoskeletal:         General: No deformity.      Cervical back: Neck supple.   Lymphadenopathy:      Head:      Right side of head: No submandibular adenopathy.      Left side of head: No submandibular adenopathy.      Cervical: No cervical adenopathy.   Skin:     General: Skin is warm and dry.   Neurological:      Mental Status: She is alert and oriented to person, place, and time.   Psychiatric:         Behavior: Behavior normal.           Protective Sensation (w/ 10 gram monofilament):  Right: Decreased  Left: Decreased    Visual Inspection:  Normal -  Bilateral    Pedal Pulses:   Right: Present  Left: Present    Posterior tibialis:   Right:Present  Left: Present    Results for orders placed or performed in visit on 02/17/22   POCT URINE DIPSTICK WITHOUT MICROSCOPE   Result Value Ref Range    Color, UA Yellow     pH, UA 5     WBC, UA neg     Nitrite, UA neg     Protein, POC neg     Glucose, UA neg     Ketones, UA neg     Urobilinogen, UA neg     Bilirubin, POC neg     Blood, UA trace     Clarity, UA Clear     Spec Grav UA 1.015      *Note: Due to a large number of results and/or encounters for the requested time period, some results have not been displayed. A complete set of results can be found in Results Review.     Lab Results   Component Value Date    HGBA1C 6.4 (H) 02/07/2022     Lab Results   Component Value Date    WBC 8.51 06/08/2021    HGB 11.0 (L) 06/08/2021    HCT 36.6 (L) 06/08/2021    MCV 90 06/08/2021     06/08/2021       Lab Results   Component Value Date    CHOL 203 (H) 02/07/2022    CHOL 188 07/22/2021    CHOL 150  06/08/2021     Lab Results   Component Value Date    HDL 48 02/07/2022    HDL 44 07/22/2021    HDL 35 (L) 06/08/2021     Lab Results   Component Value Date    LDLCALC 122.4 02/07/2022    LDLCALC 96.8 07/22/2021    LDLCALC 71.4 06/08/2021     Lab Results   Component Value Date    TRIG 163 (H) 02/07/2022    TRIG 236 (H) 07/22/2021    TRIG 218 (H) 06/08/2021     Lab Results   Component Value Date    CHOLHDL 23.6 02/07/2022    CHOLHDL 23.4 07/22/2021    CHOLHDL 23.3 06/08/2021     CMP  Sodium   Date Value Ref Range Status   06/08/2021 139 136 - 145 mmol/L Final     Potassium   Date Value Ref Range Status   06/08/2021 4.2 3.5 - 5.1 mmol/L Final     Chloride   Date Value Ref Range Status   06/08/2021 101 95 - 110 mmol/L Final     CO2   Date Value Ref Range Status   06/08/2021 25 23 - 29 mmol/L Final     Glucose   Date Value Ref Range Status   06/08/2021 104 70 - 110 mg/dL Final     BUN   Date Value Ref Range Status   06/08/2021 8 6 - 20 mg/dL Final     Creatinine   Date Value Ref Range Status   06/08/2021 0.8 0.5 - 1.4 mg/dL Final     Calcium   Date Value Ref Range Status   06/08/2021 10.7 (H) 8.7 - 10.5 mg/dL Final     Total Protein   Date Value Ref Range Status   06/08/2021 7.8 6.0 - 8.4 g/dL Final     Albumin   Date Value Ref Range Status   06/08/2021 3.9 3.5 - 5.2 g/dL Final     Total Bilirubin   Date Value Ref Range Status   06/08/2021 0.3 0.1 - 1.0 mg/dL Final     Comment:     For infants and newborns, interpretation of results should be based  on gestational age, weight and in agreement with clinical  observations.    Premature Infant recommended reference ranges:  Up to 24 hours.............<8.0 mg/dL  Up to 48 hours............<12.0 mg/dL  3-5 days..................<15.0 mg/dL  6-29 days.................<15.0 mg/dL       Alkaline Phosphatase   Date Value Ref Range Status   06/08/2021 88 55 - 135 U/L Final     AST   Date Value Ref Range Status   06/08/2021 19 10 - 40 U/L Final     ALT   Date Value Ref Range Status    06/08/2021 25 10 - 44 U/L Final     Anion Gap   Date Value Ref Range Status   06/08/2021 13 8 - 16 mmol/L Final     eGFR if    Date Value Ref Range Status   06/08/2021 >60.0 >60 mL/min/1.73 m^2 Final     eGFR if non    Date Value Ref Range Status   06/08/2021 >60.0 >60 mL/min/1.73 m^2 Final     Comment:     Calculation used to obtain the estimated glomerular filtration  rate (eGFR) is the CKD-EPI equation.        Lab Results   Component Value Date    TSH 1.730 03/24/2021       Assessment:       1. Hypersomnolence disorder    2. Obesity hypoventilation syndrome    3. Nocturnal hypoxemia due to obesity - WITHOUT ALEN    4. Circadian rhythm disorder    5. H/O gastric sleeve    6. Encounter for diabetic foot exam    7. Essential hypertension        Plan:   Hypersomnolence disorder  -     Ambulatory referral/consult to Sleep Disorders; Future; Expected date: 03/07/2022    Obesity hypoventilation syndrome    Nocturnal hypoxemia due to obesity - WITHOUT ALEN    Circadian rhythm disorder    H/O gastric sleeve    Encounter for diabetic foot exam    Essential hypertension      Foot exam done today. Peripheral neuropathy noted: denies pain and have tried gabapentin in the past-didnt't like it  Medications reviewed, several with sedating effect. It is a huge part of the problem and this was pointed out to patient. Does not want to cut back on med and does not want a stimulant. Will refer to sleep clinic for a second opinion.   Treatment options and alternatives were discussed with the patient. Patient was given ample time to ask questions. All questions were answered. Voices understanding and acceptance of this advice. Will call back if any further questions or concerns.  Sasha Sorenson MD

## 2022-03-02 ENCOUNTER — PATIENT MESSAGE (OUTPATIENT)
Dept: DIABETES | Facility: CLINIC | Age: 52
End: 2022-03-02

## 2022-03-04 ENCOUNTER — TELEPHONE (OUTPATIENT)
Dept: PRIMARY CARE CLINIC | Facility: CLINIC | Age: 52
End: 2022-03-04
Payer: MEDICAID

## 2022-03-04 ENCOUNTER — PATIENT MESSAGE (OUTPATIENT)
Dept: PSYCHIATRY | Facility: CLINIC | Age: 52
End: 2022-03-04

## 2022-03-04 ENCOUNTER — TELEPHONE (OUTPATIENT)
Dept: PULMONOLOGY | Facility: CLINIC | Age: 52
End: 2022-03-04
Payer: MEDICAID

## 2022-03-04 DIAGNOSIS — J45.40 MODERATE PERSISTENT ASTHMA WITHOUT COMPLICATION: Primary | ICD-10-CM

## 2022-03-04 NOTE — TELEPHONE ENCOUNTER
----- Message from Kristine Ray MA sent at 3/4/2022 11:44 AM CST -----  Regarding: FW: Nebulizer Kit  Contact: [phone]    ----- Message -----  From: Fannie Maya  Sent: 3/4/2022   9:58 AM CST  To: [facility] Staff  Subject: Nebulizer Kit                                    Patient is requesting a Nebulizer Kit and her Rx has . Thanks! Daryn

## 2022-03-04 NOTE — TELEPHONE ENCOUNTER
"Orders Placed This Encounter   Procedures    NEBULIZER KIT (SUPPLIES) FOR HOME USE     Medical Necessity of Nebulizer Treatment:  The use of a nebulizer is explicitly recommended on a daily basis for treatment of Chronic Obstructive Pulmonary Disease. Use of the nebulizer is medically necessary for mobilization of secretions for relief of pulmonary symptoms of coughing and airway obstruction. Additionally the use of nebulizer is medically necessary for administration of bronchodilator medications and in some cases inhaled steroids and inhaled antibiotics. The use of nebulizer is recommended at least twice a day and may be used as often as every 4- 6 hours depending on the clinical condition.     Order Specific Question:   Height:     Answer:   4'8"     Order Specific Question:   Weight:     Answer:   178 lbs     Order Specific Question:   Length of need (1-99 months):     Answer:   99     Order Specific Question:   Mask or Mouthpiece?     Answer:   Mouthpiece     1. Moderate persistent asthma without complication  NEBULIZER KIT (SUPPLIES) FOR HOME USE       "

## 2022-03-04 NOTE — TELEPHONE ENCOUNTER
----- Message from Ruby Herrera sent at 3/4/2022  9:24 AM CST -----  Pt would like a call back in regards to scheduling a appointment. Please call her at .[phone]

## 2022-03-07 ENCOUNTER — OFFICE VISIT (OUTPATIENT)
Dept: PAIN MEDICINE | Facility: CLINIC | Age: 52
End: 2022-03-07
Payer: MEDICAID

## 2022-03-07 VITALS — HEIGHT: 56 IN | RESPIRATION RATE: 17 BRPM | WEIGHT: 178 LBS | BODY MASS INDEX: 40.04 KG/M2

## 2022-03-07 DIAGNOSIS — M25.511 CHRONIC PAIN OF BOTH SHOULDERS: ICD-10-CM

## 2022-03-07 DIAGNOSIS — M79.7 FIBROMYALGIA: ICD-10-CM

## 2022-03-07 DIAGNOSIS — R52 ACUTE PAIN: Primary | ICD-10-CM

## 2022-03-07 DIAGNOSIS — G89.29 CHRONIC PAIN OF BOTH SHOULDERS: ICD-10-CM

## 2022-03-07 DIAGNOSIS — M79.18 MYOFASCIAL MUSCLE PAIN: ICD-10-CM

## 2022-03-07 DIAGNOSIS — M25.512 CHRONIC PAIN OF BOTH SHOULDERS: ICD-10-CM

## 2022-03-07 PROCEDURE — 99214 PR OFFICE/OUTPT VISIT, EST, LEVL IV, 30-39 MIN: ICD-10-PCS | Mod: 95,,, | Performed by: PHYSICIAN ASSISTANT

## 2022-03-07 PROCEDURE — 99214 OFFICE O/P EST MOD 30 MIN: CPT | Mod: 95,,, | Performed by: PHYSICIAN ASSISTANT

## 2022-03-07 RX ORDER — METHYLPREDNISOLONE 4 MG/1
TABLET ORAL
Qty: 21 EACH | Refills: 0 | Status: SHIPPED | OUTPATIENT
Start: 2022-03-07 | End: 2022-05-02

## 2022-03-07 NOTE — PROGRESS NOTES
The patient location is: home  The chief complaint leading to consultation is: low back pain, leg pain    Visit type: audiovisual    Face to Face time with patient: 10-15 minutes  20 minutes of total time spent on the encounter, which includes face to face time and non-face to face time preparing to see the patient (eg, review of tests), Obtaining and/or reviewing separately obtained history, Documenting clinical information in the electronic or other health record, Independently interpreting results (not separately reported) and communicating results to the patient/family/caregiver, or Care coordination (not separately reported).     Each patient to whom he or she provides medical services by telemedicine is:  (1) informed of the relationship between the physician and patient and the respective role of any other health care provider with respect to management of the patient; and (2) notified that he or she may decline to receive medical services by telemedicine and may withdraw from such care at any time.        Chief Pain Complaint:  Low-back Pain  shoulder pain     Interval History (3/7/2022):  Yolanda Betts presents today for follow-up visit.  Patient was last seen about 6 months ago.  She doesn't feel pain is controlled currently, but she does not feel like she can go up on the Savella because she is concerned about drowsiness.  She continues to take the Flexeril twice a day.  She alternates with Voltaren gel and lidocaine cream, which helps somewhat.  She continues to follow-up with orthopedics and had a right shoulder injection on 02/07/2022 with some pain relief.  Patient reports pain as 10/10 today.    Interval History (9/17/2021):  Yolanda Betts presents today on telemedicine visit.  Patient was last seen on 03/23/2021 with Dr. Hay. At that visit, she was on Cymbalta 60 mg twice a day, which she is now only taking once a day.  She is also taking Savella, and she inquires about an increase.   Patient reports pain as 8/10 today.    History of Present Illness:   This patient is a 50 y.o. female who presents today complaining of the above noted pain/s. The patient describes the pain as follows.  Ms. Betts his new patient clinic with complaints of bilateral shoulders, bilateral hips, thoracic and lumbar spine pain.  She has a history of fibromyalgia reports that this pain feels different from the fibromyalgia.  She has been diagnosed with fibromyalgiafor approximately 10 years has been having these joint complaints for approximately last 5 years.  She describes a sharp throbbing sensation which is worse with movement and she has been unable find anything that provides pain relief.  She has tried numerous different medications including Aleve, Tylenol, Robaxin, tizanidine, Advil, gabapentin, Flexeril, Lyrica, Cymbalta, baclofen in addition to currently participating in physical therapy.  She has had shoulder joint injections in the past which helped for short period of time but denies having had hip injections.  She denies having had surgery on any of these joints.  She reports that anti-inflammatory medications cause her blood pressure to increase therefore she has to avoid them.    Yolanda Betts is a 51 y.o. female  who is presenting with a chief complaint of lumbar back pain . The patient began experiencing this problem insidiously, and the pain has been gradually worsening over the past 5 year(s). The pain is described as throbbing, cramping, aching and heavy and is located in the bilateral lumbar spine. Pain is intermittent and lasts hours. The  pain is nonradiating. The patient rates her pain a 7 out of ten and interferes with activities of daily living a 6 out of ten. Pain is exacerbated by flexion/ extension of the lumbar spine, and is improved by rest. Patient reports prior trauma, no prior spinal surgery     - pertinent negatives: No fever, No chills, No weight loss, No bladder dysfunction,  No bowel dysfunction, No saddle anesthesia  - pertinent positives: none    - medications, other therapies tried (physical therapy, injections):     >> NSAIDs, Tylenol, Tramadol, gabapentin, lyrica, zanaflex and flexeril    >> Has previously undergone Physical Therapy    >> Has NOT previously undergone spinal injection/s      Imaging / Labs / Studies (reviewed on 3/9/2022):    11/13/2020 X-Ray Lumbar Spine Ap And Lateral  COMPARISON:  Lumbar spine radiographs August 18, 2020    FINDINGS:  Alignment of the lumbar spine is normal without listhesis.  No fracture.  No definite pars defect.  No suspicious osseous lesion.  Intervertebral disc spaces of the lumbar spine are maintained.  Inferior lumbar spine facet arthropathy is noted.  Sacroiliac joints are unremarkable.  Multiple phleboliths are present within the pelvis.      1/14/21 X-Ray Cervical Spine Complete 5 view  COMPARISON:  August 18, 2020    FINDINGS:  Visualization of C7-T1 level on the lateral view.  Vertebral height and alignment maintained with straightening of the normal curvature.  This can be seen with positioning as well as spasm and/or strain.  Prevertebral soft tissues, C1-2 articulation and odontoid normal in appearance allowing for positioning.  Pre dens space normal.  Neural foramina at widely patent at all levels bilaterally.    Remaining included osseous structures intact.  Visualized upper lung fields clear.    Impression  Straightening of normal C-spine curvature.  This can be seen with positioning as well as spasm and/or strain.    No acute fracture or subluxation.  Follow-up and or further evaluation as warranted.        Review of Systems:  CONSTITUTIONAL: patient denies any fever, chills, or weight loss  SKIN: patient denies any rash or itching  RESPIRATORY: patient denies having any shortness of breath  GASTROINTESTINAL: patient denies having any diarrhea, constipation, or bowel incontinence  GENITOURINARY: patient denies having any  "abnormal bladder function    MUSCULOSKELETAL:  - patient complains of the above noted pain/s (see chief pain complaint)    NEUROLOGICAL:   - pain as above  - strength in Lower extremities is intact, BILATERALLY  - sensation in Lower extremities is intact, BILATERALLY  - patient denies any loss of bowel or bladder control      PSYCHIATRIC: patient denies any change in mood    Other:  All other systems reviewed and are negative      Physical Exam:  Telemedicine Exam  Vitals:    03/07/22 0853   Resp: 17   Weight: 80.7 kg (178 lb)  Comment: pt reported   Height: 4' 8" (1.422 m)  Comment: pt reported     Body mass index is 39.91 kg/m².   (reviewed on 3/9/2022)     GENERAL: Well appearing, in no acute distress, alert and oriented x3.  Cooperative. Obese.   PSYCH:  Mood and affect appropriate.  SKIN: Skin color & texture normal. No visible rashes or lesions.  HEAD/FACE:  Normocephalic, atraumatic.    PULM:  No difficulty breathing. No nasal flaring. No obvious wheezing.  BACK: Limited ROM secondary to pain reproduction.    EXTREMITIES: No deformities, edema, or skin discoloration. Moving all extremities well, symmetric strength throughout.  MUSCULOSKELETAL: No obvious atrophy abnormalities are noted.   NEURO:  UNable to toe walk and heel walk without difficulty. No obvious neurologic deficit.   GAIT: normal.     Physical Exam: last in clinic visit:  General: Alert and oriented, in no apparent distress.  Gait: normal gait.  Skin: No rashes, No discoloration, No obvious lesions  HEENT: Normocephalic, atraumatic. Pupils equal and round.  Cardiovascular: Regular rate and rhythm , no significant peripheral edema present  Respiratory: Without audible wheezing, without use of accessory muscles of respiration.    Musculoskeletal:    Cervical Spine    - Pain on flexion of cervical spine Absent  - Spurling's Test:  Absent    - Pain on extension of cervical spine Present  - TTP over the cervical facet joints Present  - Cervical facet " loading Present    -TTP over trapezius  -TTP over rhomboid      Lumbar Spine    - Pain on flexion of lumbar spine Absent  - Straight Leg Raise:  Absent    - Pain on extension of lumbar spine Present  - TTP over the lumbar facet joints Present  - Lumbar facet loading Present    -Pain on palpation over the SI joint  Present  - LONG: Absent      Neuro:    Strength:  UE R/L: D: 5/5; B: 5/5; T: 5/5; WF: 5/5; WE: 5/5; IO: 5/5;  LE R/L: HF: 5/5, HE: 5/5, KF: 5/5; KE: 5/5; FE: 5/5; FF: 5/5    Extremity Reflexes: Brisk and symmetric throughout.      Extremity Sensory: Sensation to pinprick and temperature symmetric. Proprioception intact.      Psych:  Mood and affect is appropriate      Assessment:  Yolanda Betts is a 51 y.o. year old female who is presenting with       ICD-10-CM ICD-9-CM    1. Acute pain  R52 338.19 methylPREDNISolone (MEDROL DOSEPACK) 4 mg tablet   2. Myofascial muscle pain  M79.18 729.1 cyclobenzaprine (FLEXERIL) 10 MG tablet   3. Fibromyalgia  M79.7 729.1 milnacipran (SAVELLA) 25 mg Tab tablet   4. Chronic pain of both shoulders  M25.511 719.41     G89.29 338.29     M25.512       Plan:  1. Interventional: None for now.    2. Pharmacologic:    - Will prescribe Medrol Dose pack with instructions - to take for pain flare:   Day 1: 2 tablets before breakfast, 1 tablet after lunch, 1 tablet after dinner, 2 tablets at bedtime    Day 2: 1 tablet before breakfast, 1 tablet after lunch, 1 tablet after dinner, 2 tablets at bedtime    Day 3: 1 tablet before breakfast, 1 tablet after lunch, 1 tablet after dinner, 1 tablet at bedtime    Day 4: 1 tablet before breakfast, 1 tablet after lunch, 1 tablet at bedtime    Day 5: 1 tablet before breakfast, 1 tablet at bedtime    Day 6: 1 tablet before breakfast.  - Refill Savella 25mg BID; she doesn't want Increase.  She is also on Cymbalta 60 mg QD (previously BID).   - Refill Flexeril 10mg BID PRN. Orphenadrine (NORFLEX) 100 mg tablet BID PRN - wasn't  approved.  - Refill diclofenac 1% gel to apply 2g topically up to 4 times per day PRN.   - Refill lidocaine 2.5%-prilocaine 2.5% topical cream Q6H PRN topically; can alternate with Voltaren gel, which helps some.  - Patient has failed Gabapentin, Lyrica.  No NSAIDs due to reports of worsening hypertension.    3. Rehabilitative: Encouraged regular exercise. Continue exercises and activities as tolerated.     4. Diagnostic: Cervical, Thoracic, Lumbar Xray previously reviewed.     5. Follow up: PRN     - This condition does not require this patient to take time off of work, and the primary goal of our Pain Management services is to improve the patient's functional capacity.   - I discussed the risks, benefits, and alternatives to potential treatment options. All questions and concerns were fully addressed today in clinic.           Disclaimer:  This note was prepared using voice recognition system and is likely to have sound alike errors that may have been overlooked even after proof reading.  Please call me with any questions.

## 2022-03-08 ENCOUNTER — PATIENT MESSAGE (OUTPATIENT)
Dept: DIABETES | Facility: CLINIC | Age: 52
End: 2022-03-08
Payer: MEDICAID

## 2022-03-08 ENCOUNTER — LAB VISIT (OUTPATIENT)
Dept: LAB | Facility: HOSPITAL | Age: 52
End: 2022-03-08
Attending: INTERNAL MEDICINE
Payer: MEDICAID

## 2022-03-08 ENCOUNTER — TELEPHONE (OUTPATIENT)
Dept: DIABETES | Facility: CLINIC | Age: 52
End: 2022-03-08
Payer: MEDICAID

## 2022-03-08 DIAGNOSIS — Z79.4 TYPE 2 DIABETES MELLITUS WITH HYPERGLYCEMIA, WITH LONG-TERM CURRENT USE OF INSULIN: Chronic | ICD-10-CM

## 2022-03-08 DIAGNOSIS — E11.65 TYPE 2 DIABETES MELLITUS WITH HYPERGLYCEMIA, WITH LONG-TERM CURRENT USE OF INSULIN: Chronic | ICD-10-CM

## 2022-03-08 LAB
ALBUMIN SERPL BCP-MCNC: 3.8 G/DL (ref 3.5–5.2)
ALP SERPL-CCNC: 75 U/L (ref 55–135)
ALT SERPL W/O P-5'-P-CCNC: 15 U/L (ref 10–44)
ANION GAP SERPL CALC-SCNC: 9 MMOL/L (ref 8–16)
AST SERPL-CCNC: 13 U/L (ref 10–40)
BILIRUB SERPL-MCNC: 0.2 MG/DL (ref 0.1–1)
BUN SERPL-MCNC: 13 MG/DL (ref 6–20)
CALCIUM SERPL-MCNC: 9.6 MG/DL (ref 8.7–10.5)
CHLORIDE SERPL-SCNC: 103 MMOL/L (ref 95–110)
CHOLEST SERPL-MCNC: 174 MG/DL (ref 120–199)
CHOLEST/HDLC SERPL: 3.1 {RATIO} (ref 2–5)
CO2 SERPL-SCNC: 28 MMOL/L (ref 23–29)
CREAT SERPL-MCNC: 0.7 MG/DL (ref 0.5–1.4)
EST. GFR  (AFRICAN AMERICAN): >60 ML/MIN/1.73 M^2
EST. GFR  (NON AFRICAN AMERICAN): >60 ML/MIN/1.73 M^2
ESTIMATED AVG GLUCOSE: 131 MG/DL (ref 68–131)
GLUCOSE SERPL-MCNC: 73 MG/DL (ref 70–110)
HBA1C MFR BLD: 6.2 % (ref 4–5.6)
HDLC SERPL-MCNC: 57 MG/DL (ref 40–75)
HDLC SERPL: 32.8 % (ref 20–50)
LDLC SERPL CALC-MCNC: 98.2 MG/DL (ref 63–159)
NONHDLC SERPL-MCNC: 117 MG/DL
POTASSIUM SERPL-SCNC: 4.4 MMOL/L (ref 3.5–5.1)
PROT SERPL-MCNC: 7.3 G/DL (ref 6–8.4)
SODIUM SERPL-SCNC: 140 MMOL/L (ref 136–145)
TRIGL SERPL-MCNC: 94 MG/DL (ref 30–150)

## 2022-03-08 PROCEDURE — 80053 COMPREHEN METABOLIC PANEL: CPT | Performed by: INTERNAL MEDICINE

## 2022-03-08 PROCEDURE — 80061 LIPID PANEL: CPT | Performed by: INTERNAL MEDICINE

## 2022-03-08 PROCEDURE — 83036 HEMOGLOBIN GLYCOSYLATED A1C: CPT | Performed by: INTERNAL MEDICINE

## 2022-03-08 PROCEDURE — 36415 COLL VENOUS BLD VENIPUNCTURE: CPT | Performed by: INTERNAL MEDICINE

## 2022-03-08 NOTE — TELEPHONE ENCOUNTER
Appointment scheduled.     ----- Message from Katerina Hernandez sent at 3/7/2022  1:35 PM CST -----  Contact: Yolanda Mathew is requesting a call to schedule an virtual appt. Please call her back at [phone].              Thanks  DD

## 2022-03-09 ENCOUNTER — OFFICE VISIT (OUTPATIENT)
Dept: DIABETES | Facility: CLINIC | Age: 52
End: 2022-03-09
Payer: MEDICAID

## 2022-03-09 DIAGNOSIS — E11.65 TYPE 2 DIABETES MELLITUS WITH HYPERGLYCEMIA, WITH LONG-TERM CURRENT USE OF INSULIN: Primary | ICD-10-CM

## 2022-03-09 DIAGNOSIS — Z79.4 TYPE 2 DIABETES MELLITUS WITH HYPERGLYCEMIA, WITH LONG-TERM CURRENT USE OF INSULIN: Primary | ICD-10-CM

## 2022-03-09 PROCEDURE — 1159F MED LIST DOCD IN RCRD: CPT | Mod: CPTII,95,, | Performed by: NURSE PRACTITIONER

## 2022-03-09 PROCEDURE — 1159F PR MEDICATION LIST DOCUMENTED IN MEDICAL RECORD: ICD-10-PCS | Mod: CPTII,95,, | Performed by: NURSE PRACTITIONER

## 2022-03-09 PROCEDURE — 1160F PR REVIEW ALL MEDS BY PRESCRIBER/CLIN PHARMACIST DOCUMENTED: ICD-10-PCS | Mod: CPTII,95,, | Performed by: NURSE PRACTITIONER

## 2022-03-09 PROCEDURE — 3044F HG A1C LEVEL LT 7.0%: CPT | Mod: CPTII,95,, | Performed by: NURSE PRACTITIONER

## 2022-03-09 PROCEDURE — 1160F RVW MEDS BY RX/DR IN RCRD: CPT | Mod: CPTII,95,, | Performed by: NURSE PRACTITIONER

## 2022-03-09 PROCEDURE — 3072F PR LOW RISK FOR RETINOPATHY: ICD-10-PCS | Mod: CPTII,95,, | Performed by: NURSE PRACTITIONER

## 2022-03-09 PROCEDURE — 3044F PR MOST RECENT HEMOGLOBIN A1C LEVEL <7.0%: ICD-10-PCS | Mod: CPTII,95,, | Performed by: NURSE PRACTITIONER

## 2022-03-09 PROCEDURE — 99212 PR OFFICE/OUTPT VISIT, EST, LEVL II, 10-19 MIN: ICD-10-PCS | Mod: 95,,, | Performed by: NURSE PRACTITIONER

## 2022-03-09 PROCEDURE — 99212 OFFICE O/P EST SF 10 MIN: CPT | Mod: 95,,, | Performed by: NURSE PRACTITIONER

## 2022-03-09 PROCEDURE — 3066F PR DOCUMENTATION OF TREATMENT FOR NEPHROPATHY: ICD-10-PCS | Mod: CPTII,95,, | Performed by: NURSE PRACTITIONER

## 2022-03-09 PROCEDURE — 3061F PR NEG MICROALBUMINURIA RESULT DOCUMENTED/REVIEW: ICD-10-PCS | Mod: CPTII,95,, | Performed by: NURSE PRACTITIONER

## 2022-03-09 PROCEDURE — 3066F NEPHROPATHY DOC TX: CPT | Mod: CPTII,95,, | Performed by: NURSE PRACTITIONER

## 2022-03-09 PROCEDURE — 3061F NEG MICROALBUMINURIA REV: CPT | Mod: CPTII,95,, | Performed by: NURSE PRACTITIONER

## 2022-03-09 PROCEDURE — 3072F LOW RISK FOR RETINOPATHY: CPT | Mod: CPTII,95,, | Performed by: NURSE PRACTITIONER

## 2022-03-09 RX ORDER — MILNACIPRAN HYDROCHLORIDE 25 MG/1
25 TABLET, FILM COATED ORAL 2 TIMES DAILY
Qty: 60 TABLET | Refills: 5 | Status: SHIPPED | OUTPATIENT
Start: 2022-03-09 | End: 2022-05-09 | Stop reason: ALTCHOICE

## 2022-03-09 RX ORDER — CYCLOBENZAPRINE HCL 10 MG
5-10 TABLET ORAL 2 TIMES DAILY PRN
Qty: 90 TABLET | Refills: 0 | Status: SHIPPED | OUTPATIENT
Start: 2022-03-09 | End: 2022-05-09 | Stop reason: ALTCHOICE

## 2022-03-09 NOTE — PROGRESS NOTES
Subjective:         Patient ID: Yolanda Betts is a 51 y.o. female.  Patient's current PCP is Sasha Sorenson MD.       Chief Complaint: No chief complaint on file.    HPI  Yolanda Betts is a 51 y.o. Black or  female presenting for a follow up for diabetes. Patient has been diagnosed with diabetes for > 10 years. Bariatric surgery 6/1/21.     Complications related to diabetes:  HTN, Hyperlipidemia, peripheral neuropathy; denies Pancreatitis; denies Gastroparesis; denies DKA; denies Hx/family Hx of MEN2/MTC; reports Frequent UTIs/yeast infections    Past failed treatment include:  Jardiance- multiple yeast infections; Victoza - GI upset    Blood glucose testing is performed regularly, reports BG , currently 80, no more hypoglycemia, CGM - No, does not qualify    Compliance:The patient reports medication and diet compliance most of the time. Previously worsened, but now improving. Walking 2 miles      The patient location is: home, [place]  The chief complaint leading to consultation is: DM follow up    Visit type: audiovisual    Face to Face time with patient: 15 minutes of total time spent on the encounter, which includes face to face time and non-face to face time preparing to see the patient (eg, review of tests), Obtaining and/or reviewing separately obtained history, Documenting clinical information in the electronic or other health record, Independently interpreting results (not separately reported) and communicating results to the patient/family/caregiver, or Care coordination (not separately reported). Each patient to whom he or she provides medical services by telemedicine is:  (1) informed of the relationship between the physician and patient and the respective role of any other health care provider with respect to management of the patient; and (2) notified that he or she may decline to receive medical services by telemedicine and may withdraw from such care at any time.          //   , There is no height or weight on file to calculate BMI.  Her blood sugar in clinic today is:   Lab Results   Component Value Date    POCGLU 269 (A) 11/11/2019       Labs reviewed and are noted below.  Her most recent A1C is:  Lab Results   Component Value Date    HGBA1C 6.2 (H) 03/08/2022    HGBA1C 6.4 (H) 02/07/2022    HGBA1C 6.5 (H) 07/22/2021     Lab Results   Component Value Date    CPEPTIDE 4.56 02/26/2018     Lab Results   Component Value Date    GLUTAMICACID 0.00 02/26/2018     Glucose   Date Value Ref Range Status   03/08/2022 73 70 - 110 mg/dL Final     Anion Gap   Date Value Ref Range Status   03/08/2022 9 8 - 16 mmol/L Final     eGFR if    Date Value Ref Range Status   03/08/2022 >60.0 >60 mL/min/1.73 m^2 Final     eGFR if non    Date Value Ref Range Status   03/08/2022 >60.0 >60 mL/min/1.73 m^2 Final     Comment:     Calculation used to obtain the estimated glomerular filtration  rate (eGFR) is the CKD-EPI equation.          CURRENT DM MEDICATIONS:     Diabetes Medications             insulin aspart U-100 (NOVOLOG) 100 unit/mL injection Inject up to 10 units 3 times a day before meals based on sliding scale Not needed    insulin glargine U-300 conc (TOUJEO MAX U-300 SOLOSTAR) 300 unit/mL (3 mL) insulin pen Inject 86 Units into the skin every evening.       metFORMIN (GLUCOPHAGE) 1000 MG tablet Take 1 tablet (1,000 mg total) by mouth 2 (two) times daily with meals.          Diabetes Management Status    Statin: Taking  ACE/ARB: Taking    Screening or Prevention Patient's value Goal Complete/Controlled?   HgA1C Testing and Control   Lab Results   Component Value Date    HGBA1C 6.2 (H) 03/08/2022      Annually/Less than 8% Yes   Lipid profile : 03/08/2022 Annually Yes   LDL control Lab Results   Component Value Date    LDLCALC 98.2 03/08/2022    Annually/Less than 100 mg/dl  Yes   Nephropathy screening Lab Results   Component Value Date    LABMICR <5.0 03/08/2022      Lab Results   Component Value Date    PROTEINUA Negative 07/20/2020    Annually Yes   Blood pressure BP Readings from Last 1 Encounters:   02/28/22 131/75    Less than 140/90 Yes   Dilated retinal exam : 10/20/2021 Annually Yes   Foot exam   : 02/28/2022 denies wounds Annually Yes     Review of Systems   Constitutional: Negative for activity change and appetite change.   Gastrointestinal: Negative for abdominal pain, constipation, nausea and vomiting.        Hx of IBS   Endocrine: Negative for polydipsia, polyphagia and polyuria.   Musculoskeletal: Positive for arthralgias.   Neurological: Negative for syncope and weakness.        Neuropathy   Psychiatric/Behavioral: Negative for confusion.         Objective:      Physical Exam  Constitutional:       General: She is not in acute distress.     Appearance: She is not ill-appearing.   Neurological:      Mental Status: She is alert.   Psychiatric:         Speech: Speech normal.         Assessment:       1. Type 2 diabetes mellitus with hyperglycemia, with long-term current use of insulin        Plan:   Type 2 diabetes mellitus with hyperglycemia, with long-term current use of insulin         PLAN:   - Condition: uncontrolled    - Monitor blood glucose 4x daily.Goals reviewed  - She is working with the RD  - BP and LDL- Recommended lifestyle modifications for management. Encouraged healthy low fat, low carb diet and increase physical activity  - Medication Changes: Continue Toujeo 86 units, Continue Metformin, Take Novolog per ss,   - She would like to keep this regimen, we previously discussed restarting her GLP-1, it was deferred due to GI pain- later dx as kidney stones  - The patient was explained the above plan and given opportunity to ask questions.  She understands, chooses and consents to this plan and accepts all the risks, which include but are not limited to the risks mentioned above.   - Labs ordered as above  - Nurse visit: deferred   - Follow up: 1  month       If blood pre-meal sugar is 151-200 take +2 units of Novolog;  If blood pre-meal sugar is 201-250 take +4 units of Novolog;  If blood pre-meal sugar is 251-300 take +6 units of Novolog;  If blood pre-meal sugar is 301-350 take +8 units of Novolog;  If blood pre-meal sugar is 351-400+ take +10 units of Novolog;      A total of 15 minutes was spent in face to face time, of which over 50% was spent in counseling patient on disease process, complications, treatment, and side effects of medications.

## 2022-03-10 ENCOUNTER — OFFICE VISIT (OUTPATIENT)
Dept: PODIATRY | Facility: CLINIC | Age: 52
End: 2022-03-10
Payer: MEDICAID

## 2022-03-10 VITALS
SYSTOLIC BLOOD PRESSURE: 125 MMHG | WEIGHT: 177.94 LBS | BODY MASS INDEX: 40.03 KG/M2 | DIASTOLIC BLOOD PRESSURE: 80 MMHG | HEIGHT: 56 IN | HEART RATE: 95 BPM

## 2022-03-10 DIAGNOSIS — E11.42 DIABETIC POLYNEUROPATHY ASSOCIATED WITH TYPE 2 DIABETES MELLITUS: Primary | ICD-10-CM

## 2022-03-10 DIAGNOSIS — M20.42 HAMMER TOES OF BOTH FEET: ICD-10-CM

## 2022-03-10 DIAGNOSIS — M20.41 HAMMER TOES OF BOTH FEET: ICD-10-CM

## 2022-03-10 DIAGNOSIS — G43.109 MIGRAINE WITH AURA AND WITHOUT STATUS MIGRAINOSUS, NOT INTRACTABLE: Chronic | ICD-10-CM

## 2022-03-10 DIAGNOSIS — L84 CORN OR CALLUS: ICD-10-CM

## 2022-03-10 PROCEDURE — 3074F SYST BP LT 130 MM HG: CPT | Mod: CPTII,,, | Performed by: PODIATRIST

## 2022-03-10 PROCEDURE — 3079F DIAST BP 80-89 MM HG: CPT | Mod: CPTII,,, | Performed by: PODIATRIST

## 2022-03-10 PROCEDURE — 3066F PR DOCUMENTATION OF TREATMENT FOR NEPHROPATHY: ICD-10-PCS | Mod: CPTII,,, | Performed by: PODIATRIST

## 2022-03-10 PROCEDURE — 3066F NEPHROPATHY DOC TX: CPT | Mod: CPTII,,, | Performed by: PODIATRIST

## 2022-03-10 PROCEDURE — 99999 PR PBB SHADOW E&M-EST. PATIENT-LVL V: ICD-10-PCS | Mod: PBBFAC,,, | Performed by: PODIATRIST

## 2022-03-10 PROCEDURE — 3072F PR LOW RISK FOR RETINOPATHY: ICD-10-PCS | Mod: CPTII,,, | Performed by: PODIATRIST

## 2022-03-10 PROCEDURE — 3008F PR BODY MASS INDEX (BMI) DOCUMENTED: ICD-10-PCS | Mod: CPTII,,, | Performed by: PODIATRIST

## 2022-03-10 PROCEDURE — 99214 OFFICE O/P EST MOD 30 MIN: CPT | Mod: 25,S$PBB,, | Performed by: PODIATRIST

## 2022-03-10 PROCEDURE — 3079F PR MOST RECENT DIASTOLIC BLOOD PRESSURE 80-89 MM HG: ICD-10-PCS | Mod: CPTII,,, | Performed by: PODIATRIST

## 2022-03-10 PROCEDURE — 3044F PR MOST RECENT HEMOGLOBIN A1C LEVEL <7.0%: ICD-10-PCS | Mod: CPTII,,, | Performed by: PODIATRIST

## 2022-03-10 PROCEDURE — 3074F PR MOST RECENT SYSTOLIC BLOOD PRESSURE < 130 MM HG: ICD-10-PCS | Mod: CPTII,,, | Performed by: PODIATRIST

## 2022-03-10 PROCEDURE — 99215 OFFICE O/P EST HI 40 MIN: CPT | Mod: PBBFAC | Performed by: PODIATRIST

## 2022-03-10 PROCEDURE — 99214 PR OFFICE/OUTPT VISIT, EST, LEVL IV, 30-39 MIN: ICD-10-PCS | Mod: 25,S$PBB,, | Performed by: PODIATRIST

## 2022-03-10 PROCEDURE — 3072F LOW RISK FOR RETINOPATHY: CPT | Mod: CPTII,,, | Performed by: PODIATRIST

## 2022-03-10 PROCEDURE — 99999 PR PBB SHADOW E&M-EST. PATIENT-LVL V: CPT | Mod: PBBFAC,,, | Performed by: PODIATRIST

## 2022-03-10 PROCEDURE — 3008F BODY MASS INDEX DOCD: CPT | Mod: CPTII,,, | Performed by: PODIATRIST

## 2022-03-10 PROCEDURE — 3061F PR NEG MICROALBUMINURIA RESULT DOCUMENTED/REVIEW: ICD-10-PCS | Mod: CPTII,,, | Performed by: PODIATRIST

## 2022-03-10 PROCEDURE — 3044F HG A1C LEVEL LT 7.0%: CPT | Mod: CPTII,,, | Performed by: PODIATRIST

## 2022-03-10 PROCEDURE — 3061F NEG MICROALBUMINURIA REV: CPT | Mod: CPTII,,, | Performed by: PODIATRIST

## 2022-03-10 NOTE — PROGRESS NOTES
Subjective:     Patient ID: Yolanda Betts is a 51 y.o. female.    Chief Complaint: Foot Pain (Pt c/o BL foot pain 8/10, diabertic pt wears tennis shoes w/socks, PCP Dr. Sorenson last seen 2-4-22)    Yolanda is a 51 y.o. female who presents to the clinic for evaluation and treatment of high risk feet. Yolanda has a past medical history of Abnormal Pap smear of cervix, Anemia, Anxiety, Arthritis, Asthma (10/11/2016), Bipolar 1 disorder, Diabetes mellitus, type 2, Dyslipidemia associated with type 2 diabetes mellitus (5/13/2019), General anesthetics causing adverse effect in therapeutic use, Genital warts, GERD (gastroesophageal reflux disease), Herpes simplex virus (HSV) infection, Hyperlipidemia, Hypertension, Hypertension complicating diabetes (5/5/2019), Migraine with aura and without status migrainosus, not intractable (3/21/2016), Mild persistent asthma without complication (10/11/2016), Morbid obesity with body mass index (BMI) of 45.0 to 49.9 in adult (8/3/2017), Obstructive sleep apnea, ALEN (obstructive sleep apnea), Schizoaffective disorder, bipolar type (4/25/2019), Seasonal allergic rhinitis due to pollen (5/13/2019), Type 2 diabetes mellitus with hyperglycemia, with long-term current use of insulin (12/21/2017), and Type 2 diabetes mellitus with hyperglycemia, without long-term current use of insulin (12/21/2017). The patient's chief complaint is pain in both feet. Patient rates pain 8/10.  This patient has documented high risk feet requiring routine maintenance secondary to diabetes mellitis and those secondary complications of diabetes, as mentioned..    PCP: Sasha Sorenson MD    Date Last Seen by PCP: 02/04/2022    Current shoe gear:  Affected Foot: Tennis shoes     Unaffected Foot: Tennis shoes    Hemoglobin A1C   Date Value Ref Range Status   03/08/2022 6.2 (H) 4.0 - 5.6 % Final     Comment:     ADA Screening Guidelines:  5.7-6.4%  Consistent with prediabetes  >or=6.5%  Consistent with diabetes    High  levels of fetal hemoglobin interfere with the HbA1C  assay. Heterozygous hemoglobin variants (HbS, HgC, etc)do  not significantly interfere with this assay.   However, presence of multiple variants may affect accuracy.     02/07/2022 6.4 (H) 4.0 - 5.6 % Final     Comment:     ADA Screening Guidelines:  5.7-6.4%  Consistent with prediabetes  >or=6.5%  Consistent with diabetes    High levels of fetal hemoglobin interfere with the HbA1C  assay. Heterozygous hemoglobin variants (HbS, HgC, etc)do  not significantly interfere with this assay.   However, presence of multiple variants may affect accuracy.     07/22/2021 6.5 (H) 4.0 - 5.6 % Final     Comment:     ADA Screening Guidelines:  5.7-6.4%  Consistent with prediabetes  >or=6.5%  Consistent with diabetes    High levels of fetal hemoglobin interfere with the HbA1C  assay. Heterozygous hemoglobin variants (HbS, HgC, etc)do  not significantly interfere with this assay.   However, presence of multiple variants may affect accuracy.             Patient Active Problem List   Diagnosis    GERD (gastroesophageal reflux disease)    Menopausal syndrome (hot flashes)    Migraine with aura and without status migrainosus, not intractable    Abnormal ECG    Sleep-related bruxism    Diabetic polyneuropathy associated with type 2 diabetes mellitus    Moderate persistent asthma without complication    Bipolar 1 disorder    Diffusion capacity of lung (dl), decreased    Hypertriglyceridemia    Generalized anxiety disorder    Type 2 diabetes mellitus with hyperglycemia, with long-term current use of insulin    Iron deficiency anemia    Schizoaffective disorder, bipolar type    Vitamin D deficiency    Fibromyalgia    Hypertension complicating diabetes    Dyslipidemia associated with type 2 diabetes mellitus    Seasonal allergic rhinitis due to pollen    Nocturnal hypoxemia due to obesity - WITHOUT ALEN    Obesity hypoventilation syndrome    Hyperaldosteronism    BMI  "40.0-44.9, adult    High risk of appointment cancellation or no-show    Irritable bowel syndrome with both constipation and diarrhea    Right shoulder tendonitis    Chronic neck and back pain    Proximal muscle weakness    Poor posture    Snoring    Circadian rhythm disorder    Hypersomnolence disorder    NAFLD (nonalcoholic fatty liver disease)    Bilateral carpal tunnel syndrome    Asthma    Type 2 diabetes mellitus    History of anorexia nervosa    Bipolar disord, crnt epsd depress, severe, w psych features    Sleep problem caused by drug    DM type 2 with diabetic peripheral neuropathy       Medication List with Changes/Refills   Current Medications    AMLODIPINE (NORVASC) 10 MG TABLET    Take 1 tablet (10 mg total) by mouth once daily.    AMLODIPINE (NORVASC) 5 MG TABLET    Take 1 tablet (5 mg total) by mouth every evening.    ASPIRIN (ECOTRIN) 81 MG EC TABLET    Take 81 mg by mouth once daily.    BD INSULIN SYRINGE ULTRA-FINE 1/2 ML 30 GAUGE X 1/2" SYRG        BLOOD SUGAR DIAGNOSTIC (ONETOUCH ULTRA TEST) STRP    Check blood glucose 4 times daily as directed and as needed (dispense insurance preferred brand or patient choice)    BLOOD SUGAR DIAGNOSTIC STRP    1 each by Misc.(Non-Drug; Combo Route) route 4 (four) times daily. [facility] Glucose Test Strips    BLOOD-GLUCOSE METER MISC    Use as directed    BUDESONIDE-FORMOTEROL 160-4.5 MCG (SYMBICORT) 160-4.5 MCG/ACTUATION HFAA    Inhale 2 puffs into the lungs every 12 (twelve) hours. Controller : Use spacer    CYCLOBENZAPRINE (FLEXERIL) 10 MG TABLET    Take 0.5-1 tablets (5-10 mg total) by mouth 2 (two) times daily as needed for Muscle spasms. May cause drowsiness.    CYCLOSPORINE 0.05 % DROP    Place 1 drop into both eyes 2 (two) times daily.    DICLOFENAC SODIUM (VOLTAREN) 1 % GEL    Apply 2 grams topically 4 (four) times daily as needed.    FENOFIBRATE (TRICOR) 54 MG TABLET    Take 1 tablet (54 mg total) by mouth once daily.    FISH OIL-OMEGA-3 " "FATTY ACIDS 300-1,000 MG CAPSULE    Take 1 capsule by mouth once daily.    FLASH GLUCOSE SCANNING READER (FREESTYLE AMY 2 READER) [facility]    Use as directed to check blood sugar    FUROSEMIDE (LASIX) 20 MG TABLET    Take 1 tablet (20 mg total) by mouth once daily.    INSULIN ASPART U-100 (NOVOLOG FLEXPEN U-100 INSULIN) 100 UNIT/ML (3 ML) INPN PEN    Inject 10 Units into the skin 3 (three) times daily before meals. Use based on sliding scale    INSULIN GLARGINE U-300 CONC (TOUJEO MAX U-300 SOLOSTAR) 300 UNIT/ML (3 ML) INSULIN PEN    Inject 90 Units into the skin every evening.    LANCETS (ONETOUCH DELICA PLUS LANCET) 30 GAUGE MISC    Use as directed 3 times daily    LIDOCAINE-PRILOCAINE (EMLA) CREAM    Apply topically up to 4 times per day to affected area for pain relief    MAGNESIUM OXIDE (MAG-OX) 400 MG (241.3 MG MAGNESIUM) TABLET    Take 1 tablet (400 mg total) by mouth once daily.    METFORMIN (GLUCOPHAGE) 1000 MG TABLET    Take 1 tablet (1,000 mg total) by mouth 2 (two) times daily with meals.    METHYLPREDNISOLONE (MEDROL DOSEPACK) 4 MG TABLET    Take as directed    MILNACIPRAN (SAVELLA) 25 MG TAB TABLET    Take 1 tablet (25 mg total) by mouth 2 (two) times daily.    MULTIVIT,CALC,MINS/IRON/FOLIC (DAILY MULTIPLE FOR WOMEN ORAL)    Take 1 tablet by mouth once daily.    PEN NEEDLE, DIABETIC (BD ULTRA-FINE MINI PEN NEEDLE) 31 GAUGE X 3/16" NDLE    Use 5 a day    PEN NEEDLE, DIABETIC (BD ULTRA-FINE MINI PEN NEEDLE) 31 GAUGE X 3/16" NDLE    Use one needle 5 times a day    POLYETHYLENE GLYCOL (GLYCOLAX) 17 GRAM PWPK    Take 17 g by mouth 2 (two) times daily.    SPIRONOLACTONE (ALDACTONE) 25 MG TABLET    Take 1 tablet (25 mg total) by mouth once daily.    VALSARTAN (DIOVAN) 320 MG TABLET    Take 1 tablet (320 mg total) by mouth once daily.   Changed and/or Refilled Medications    Modified Medication Previous Medication    CLONAZEPAM (KLONOPIN) 1 MG TABLET clonazePAM (KLONOPIN) 1 MG tablet       Take 1 tablet (1 mg " total) by mouth once daily.    Take 1 tablet (1 mg total) by mouth once daily.    DOCUSATE SODIUM (COLACE) 100 MG CAPSULE docusate sodium (COLACE) 100 MG capsule       Take 1 capsule (100 mg total) by mouth 2 (two) times daily as needed for Constipation.    Take 1 capsule (100 mg total) by mouth 2 (two) times daily.    DULOXETINE (CYMBALTA) 30 MG CAPSULE DULoxetine (CYMBALTA) 30 MG capsule       Take 1 capsule (30 mg total) by mouth every evening.    Take 1 capsule (30 mg total) by mouth every evening.    FROVATRIPTAN (FROVA) 2.5 MG TABLET frovatriptan (FROVA) 2.5 MG tablet       Take 1 tablet at onset of acute migraine. May take 1 more tablet 2 hours later if needed. (MAX 2 TABLET PER DAY. MAX 4 TABLETS PER WEEK.)    Take 1 tablet at onset of acute migraine. May take 1 more tablet 2 hours later if needed. (MAX 2 TABLET PER DAY. MAX 4 TABLETS PER WEEK.)    LURASIDONE (LATUDA) 60 MG TAB TABLET lurasidone (LATUDA) 60 mg Tab tablet       Take 1 tablet by mouth daily with 350 calories    Take 1 tab by mouth daily with 350 calories       Review of patient's allergies indicates:   Allergen Reactions    Codeine Itching    Hydromorphone Other (See Comments)     Can't wake up for long time if taken  Slow to wake up after surgery after receiving    Lyrica [pregabalin] Itching    Neuromuscular blockers, steroidal Hives     some    Latex, natural rubber Rash    Morphine Rash     itching    Norco [hydrocodone-acetaminophen] Itching, Rash and Hallucinations    Seconal [secobarbital sodium] Rash     itching    Tylox [oxycodone-acetaminophen] Rash       Past Surgical History:   Procedure Laterality Date    abcess removed right back      BELT ABDOMINOPLASTY      BREAST SURGERY Bilateral     Reduction     SECTION      X 2    COLONOSCOPY      COLONOSCOPY N/A 2018    Procedure: colonoscopy for iron deficiency anemia;  Surgeon: Frankie Sanchez MD;  [facility];  Service: Endoscopy;   "Laterality: N/A;    COLONOSCOPY N/A 2/28/2018    Procedure: COLONOSCOPY;  Surgeon: Frankie Sanchez MD;  Location: [facility] ENDO;  Service: Endoscopy;  Laterality: N/A;    HYSTERECTOMY      w/ BSO; hypermenorrhea    MOUTH SURGERY  1996    OOPHORECTOMY      hyst/bso, hypermenorrhea    ROBOT-ASSISTED CHOLECYSTECTOMY USING DA DARI XI N/A 1/3/2020    Procedure: XI ROBOTIC CHOLECYSTECTOMY;  Surgeon: Damir Driscoll MD;  Location: [facility] OR;  Service: General;  Laterality: N/A;    TOTAL REDUCTION MAMMOPLASTY      TUBAL LIGATION         Family History   Problem Relation Age of Onset    Diabetes Mother     Hyperlipidemia Mother     Hypertension Mother     Asthma Mother     COPD Mother     Glaucoma Mother     Thyroid disease Mother     Anesthesia problems Mother         "almost had a cardiac arrest" , blood clots    Hypertension Father     Hyperlipidemia Father     Cancer Father         Brain, lung, liver, kidney    Heart disease Maternal Grandmother     Hyperlipidemia Maternal Grandmother     Hypertension Maternal Grandmother     Cataracts Maternal Grandmother     Diabetes Maternal Grandmother     Heart disease Maternal Grandfather     Hyperlipidemia Maternal Grandfather     Hypertension Maternal Grandfather     Glaucoma Maternal Grandfather     Cancer Maternal Grandfather     Cataracts Maternal Grandfather     Macular degeneration Maternal Grandfather     Diabetes Maternal Grandfather     Heart disease Paternal Grandmother     Hyperlipidemia Paternal Grandmother     Hypertension Paternal Grandmother     Cataracts Paternal Grandmother     Heart disease Paternal Grandfather     Hyperlipidemia Paternal Grandfather     Hypertension Paternal Grandfather     Cataracts Paternal Grandfather     Breast cancer Maternal Cousin     Breast cancer Maternal Cousin     Breast cancer Maternal Cousin     Breast cancer Maternal Cousin        Social History     Socioeconomic History    Marital " "status: Single   Tobacco Use    Smoking status: Never Smoker    Smokeless tobacco: Never Used   Substance and Sexual Activity    Alcohol use: Yes     Alcohol/week: 0.0 standard drinks     Comment: socially  No alcohol 72h prior to sx    Drug use: No    Sexual activity: Yes     Partners: Male     Birth control/protection: Surgical     Comment: hyst   Social History Narrative    Long-term care nurse       Vitals:    03/10/22 1147   BP: 125/80   Pulse: 95   Weight: 80.7 kg (177 lb 14.6 oz)   Height: 4' 8" (1.422 m)   PainSc:   8       Hemoglobin A1C   Date Value Ref Range Status   03/08/2022 6.2 (H) 4.0 - 5.6 % Final     Comment:     ADA Screening Guidelines:  5.7-6.4%  Consistent with prediabetes  >or=6.5%  Consistent with diabetes    High levels of fetal hemoglobin interfere with the HbA1C  assay. Heterozygous hemoglobin variants (HbS, HgC, etc)do  not significantly interfere with this assay.   However, presence of multiple variants may affect accuracy.     02/07/2022 6.4 (H) 4.0 - 5.6 % Final     Comment:     ADA Screening Guidelines:  5.7-6.4%  Consistent with prediabetes  >or=6.5%  Consistent with diabetes    High levels of fetal hemoglobin interfere with the HbA1C  assay. Heterozygous hemoglobin variants (HbS, HgC, etc)do  not significantly interfere with this assay.   However, presence of multiple variants may affect accuracy.     07/22/2021 6.5 (H) 4.0 - 5.6 % Final     Comment:     ADA Screening Guidelines:  5.7-6.4%  Consistent with prediabetes  >or=6.5%  Consistent with diabetes    High levels of fetal hemoglobin interfere with the HbA1C  assay. Heterozygous hemoglobin variants (HbS, HgC, etc)do  not significantly interfere with this assay.   However, presence of multiple variants may affect accuracy.         Review of Systems   Constitutional: Negative for chills and fever.   Respiratory: Negative for shortness of breath.    Cardiovascular: Negative for chest pain, palpitations, orthopnea, claudication " and leg swelling.   Gastrointestinal: Negative for diarrhea, nausea and vomiting.   Musculoskeletal: Negative for joint pain.   Skin: Negative for rash.   Neurological: Positive for tingling and sensory change.   Psychiatric/Behavioral: Negative.              Objective:   PHYSICAL EXAM: Apperance: Alert and orient in no distress,well developed, and with good attention to grooming and body habits  Patient presents ambulating in tennis shoes.   LOWER EXTREMITY EXAM:  VASCULAR: Dorsalis pedis pulses 2/4 bilateral and Posterior Tibial pulses 2/4 bilateral. Capillary fill time <4 seconds bilateral. No edema observed bilateral. Varicosities absent bilateral. Skin temperature of the lower extremities is warm to warm, proximal to distal. Hair growth WNL bilateral.  DERMATOLOGICAL: No skin rashes, subcutaneous nodules, lesions, or ulcers observed bilateral. Nails 1,2,3,4,5 bilateral normal length and thickness. Webspaces 1,2,3,4 bilateral clean, dry and without evidence of break in skin integrity. Dry skin noted to bilateral heels.   NEUROLOGICAL: Light touch, sharp-dull, proprioception all present and equal bilaterally.  Vibratory sensation diminished at bilateral hallux. Protective sensation absent at toe sites as tested with a Wilson-Rhoda 5.07 monofilament.   MUSCULOSKELETAL: Muscle strength is 5/5 for foot inverters, everters, plantarflexors, and dorsiflexors. Muscle tone is normal. Ankle joints bilateral shows decreased ROM. bilateral ankle ROM shows greater decrease in dorsiflexion with knee extended. Ankle joint ROM is pain free and without crepitus bilateral.         Assessment:   Diabetic polyneuropathy associated with type 2 diabetes mellitus  -     DIABETIC SHOES FOR HOME USE    Hammer toes of both feet  -     DIABETIC SHOES FOR HOME USE    Corn or callus  -     DIABETIC SHOES FOR HOME USE          Plan:   Diabetic polyneuropathy associated with type 2 diabetes mellitus  -     DIABETIC SHOES FOR HOME  USE    Hammer toes of both feet  -     DIABETIC SHOES FOR HOME USE    Corn or callus  -     DIABETIC SHOES FOR HOME USE      I counseled the patient on her conditions, regarding findings of my examination, my impressions, and usual treatment plan.   Greater than 50% of this visit spent on counseling and coordination of care.  Greater than 15 minutes of a 20 minute appointment spent on education about the diabetic foot, neuropathy, and prevention of limb loss.  Shoe inspection. Diabetic Foot Education. Patient reminded of the importance of good nutrition and blood sugar control to help prevent podiatric complications of diabetes. Patient instructed on proper foot hygeine. We discussed wearing proper shoe gear, daily foot inspections, never walking without protective shoe gear, never putting sharp instruments to feet.    Prescription written for Diabetic shoes and inserts.   Patient  will continue to monitor the areas daily, inspect feet, wear protective shoe gear when ambulatory, moisturizer to maintain skin integrity. Patient reminded of the importance of good nutrition and blood sugar control to help prevent podiatric complications of diabetes.  Patient to return 12 months or sooner if needed.             Alfred Sweeney DPM  [facility]

## 2022-03-11 RX ORDER — FROVATRIPTAN SUCCINATE 2.5 MG/1
TABLET, FILM COATED ORAL
Qty: 9 TABLET | Refills: 1 | Status: SHIPPED | OUTPATIENT
Start: 2022-03-11 | End: 2022-12-22 | Stop reason: SDUPTHER

## 2022-03-11 NOTE — TELEPHONE ENCOUNTER
ergocalciferol (ERGOCALCIFEROL) 50,000 unit Cap   REASON:  She was given a prescription for a 12 week course of therapy.  After that, she is supposed to transition to over-the-counter vitamin D3 2000 IU daily.    losartan-hydrochlorothiazide 100-25 mg (HYZAAR) 100-25 mg per tablet  REASON: The source prescription was discontinued on 6/4/2019 by Jany Up PharmD for the following reason: Alternate therapy.    
Admission

## 2022-03-14 ENCOUNTER — OFFICE VISIT (OUTPATIENT)
Dept: PULMONOLOGY | Facility: CLINIC | Age: 52
End: 2022-03-14
Payer: MEDICAID

## 2022-03-14 ENCOUNTER — HOSPITAL ENCOUNTER (OUTPATIENT)
Dept: RADIOLOGY | Facility: HOSPITAL | Age: 52
Discharge: HOME | End: 2022-03-14
Attending: INTERNAL MEDICINE
Payer: MEDICAID

## 2022-03-14 VITALS
OXYGEN SATURATION: 98 % | HEIGHT: 56 IN | WEIGHT: 183.75 LBS | BODY MASS INDEX: 41.34 KG/M2 | HEART RATE: 91 BPM | DIASTOLIC BLOOD PRESSURE: 90 MMHG | RESPIRATION RATE: 16 BRPM | SYSTOLIC BLOOD PRESSURE: 130 MMHG

## 2022-03-14 DIAGNOSIS — E11.59 HYPERTENSION COMPLICATING DIABETES: Chronic | ICD-10-CM

## 2022-03-14 DIAGNOSIS — J45.40 MODERATE PERSISTENT ASTHMA WITHOUT COMPLICATION: ICD-10-CM

## 2022-03-14 DIAGNOSIS — F25.0 SCHIZOAFFECTIVE DISORDER, BIPOLAR TYPE: Chronic | ICD-10-CM

## 2022-03-14 DIAGNOSIS — R94.2 RESTRICTIVE VENTILATORY DEFECT: ICD-10-CM

## 2022-03-14 DIAGNOSIS — G47.10 HYPERSOMNOLENCE DISORDER: Primary | ICD-10-CM

## 2022-03-14 DIAGNOSIS — E66.2 OBESITY HYPOVENTILATION SYNDROME: ICD-10-CM

## 2022-03-14 DIAGNOSIS — E78.1 HYPERTRIGLYCERIDEMIA: Chronic | ICD-10-CM

## 2022-03-14 DIAGNOSIS — I15.2 HYPERTENSION COMPLICATING DIABETES: Chronic | ICD-10-CM

## 2022-03-14 DIAGNOSIS — G47.20 CIRCADIAN RHYTHM DISORDER: ICD-10-CM

## 2022-03-14 PROCEDURE — 3080F PR MOST RECENT DIASTOLIC BLOOD PRESSURE >= 90 MM HG: ICD-10-PCS | Mod: CPTII,,, | Performed by: PHYSICIAN ASSISTANT

## 2022-03-14 PROCEDURE — 99214 PR OFFICE/OUTPT VISIT, EST, LEVL IV, 30-39 MIN: ICD-10-PCS | Mod: S$PBB,,, | Performed by: PHYSICIAN ASSISTANT

## 2022-03-14 PROCEDURE — 71046 X-RAY EXAM CHEST 2 VIEWS: CPT | Mod: TC

## 2022-03-14 PROCEDURE — 99215 OFFICE O/P EST HI 40 MIN: CPT | Mod: PBBFAC,25 | Performed by: PHYSICIAN ASSISTANT

## 2022-03-14 PROCEDURE — 3075F PR MOST RECENT SYSTOLIC BLOOD PRESS GE 130-139MM HG: ICD-10-PCS | Mod: CPTII,,, | Performed by: PHYSICIAN ASSISTANT

## 2022-03-14 PROCEDURE — 1159F PR MEDICATION LIST DOCUMENTED IN MEDICAL RECORD: ICD-10-PCS | Mod: CPTII,,, | Performed by: PHYSICIAN ASSISTANT

## 2022-03-14 PROCEDURE — 3072F LOW RISK FOR RETINOPATHY: CPT | Mod: CPTII,,, | Performed by: PHYSICIAN ASSISTANT

## 2022-03-14 PROCEDURE — 1160F PR REVIEW ALL MEDS BY PRESCRIBER/CLIN PHARMACIST DOCUMENTED: ICD-10-PCS | Mod: CPTII,,, | Performed by: PHYSICIAN ASSISTANT

## 2022-03-14 PROCEDURE — 99214 OFFICE O/P EST MOD 30 MIN: CPT | Mod: S$PBB,,, | Performed by: PHYSICIAN ASSISTANT

## 2022-03-14 PROCEDURE — 1160F RVW MEDS BY RX/DR IN RCRD: CPT | Mod: CPTII,,, | Performed by: PHYSICIAN ASSISTANT

## 2022-03-14 PROCEDURE — 3072F PR LOW RISK FOR RETINOPATHY: ICD-10-PCS | Mod: CPTII,,, | Performed by: PHYSICIAN ASSISTANT

## 2022-03-14 PROCEDURE — 71046 X-RAY EXAM CHEST 2 VIEWS: CPT | Mod: 26,,, | Performed by: RADIOLOGY

## 2022-03-14 PROCEDURE — 1159F MED LIST DOCD IN RCRD: CPT | Mod: CPTII,,, | Performed by: PHYSICIAN ASSISTANT

## 2022-03-14 PROCEDURE — 71046 XR CHEST PA AND LATERAL: ICD-10-PCS | Mod: 26,,, | Performed by: RADIOLOGY

## 2022-03-14 PROCEDURE — 3080F DIAST BP >= 90 MM HG: CPT | Mod: CPTII,,, | Performed by: PHYSICIAN ASSISTANT

## 2022-03-14 PROCEDURE — 3008F PR BODY MASS INDEX (BMI) DOCUMENTED: ICD-10-PCS | Mod: CPTII,,, | Performed by: PHYSICIAN ASSISTANT

## 2022-03-14 PROCEDURE — 3061F PR NEG MICROALBUMINURIA RESULT DOCUMENTED/REVIEW: ICD-10-PCS | Mod: CPTII,,, | Performed by: PHYSICIAN ASSISTANT

## 2022-03-14 PROCEDURE — 3075F SYST BP GE 130 - 139MM HG: CPT | Mod: CPTII,,, | Performed by: PHYSICIAN ASSISTANT

## 2022-03-14 PROCEDURE — 99999 PR PBB SHADOW E&M-EST. PATIENT-LVL V: CPT | Mod: PBBFAC,,, | Performed by: PHYSICIAN ASSISTANT

## 2022-03-14 PROCEDURE — 3066F NEPHROPATHY DOC TX: CPT | Mod: CPTII,,, | Performed by: PHYSICIAN ASSISTANT

## 2022-03-14 PROCEDURE — 3044F HG A1C LEVEL LT 7.0%: CPT | Mod: CPTII,,, | Performed by: PHYSICIAN ASSISTANT

## 2022-03-14 PROCEDURE — 3066F PR DOCUMENTATION OF TREATMENT FOR NEPHROPATHY: ICD-10-PCS | Mod: CPTII,,, | Performed by: PHYSICIAN ASSISTANT

## 2022-03-14 PROCEDURE — 3044F PR MOST RECENT HEMOGLOBIN A1C LEVEL <7.0%: ICD-10-PCS | Mod: CPTII,,, | Performed by: PHYSICIAN ASSISTANT

## 2022-03-14 PROCEDURE — 3008F BODY MASS INDEX DOCD: CPT | Mod: CPTII,,, | Performed by: PHYSICIAN ASSISTANT

## 2022-03-14 PROCEDURE — 99999 PR PBB SHADOW E&M-EST. PATIENT-LVL V: ICD-10-PCS | Mod: PBBFAC,,, | Performed by: PHYSICIAN ASSISTANT

## 2022-03-14 PROCEDURE — 3061F NEG MICROALBUMINURIA REV: CPT | Mod: CPTII,,, | Performed by: PHYSICIAN ASSISTANT

## 2022-03-14 NOTE — PROGRESS NOTES
Subjective:       Patient ID: Yolanda Betts is a 51 y.o. female.    Chief Complaint: Asthma      52yo here for complaint of daytime fatigue  Has been going on for 6 months, getting worse  Feels foggy during the day, memory issues, concentration issues  In bed around 9pm, sometimes issues falling asleep, tired but cannot fall asleep, will watch tv is unable to sleep, wakes frequently, wakes up gasping for air, witnessed apneas, snores  concerns for narcolepsy, reports narcolepsy history, did not start medications  Complains of legs buckling with emotional changes, leg weakness, unable to stay awake sometimes, she says she has fallen off a treadmill recently due to this  Uses oxygen 2L at night  Taking cymbalta, latuda, klonapin at night for bipolar - recently decreased these medications a few weeks ago, tapering down - followed by Dr Mariam Young  Also with history of fibromyalgia  Recent weight loss, gastric sleeve 6/4/2021  Dx of circadian rhythm disorder controlled with working on sleep hygiene per last pulm note  PSG in 2020 did not reveal sleep apnea, overnight oxygen testing revealed hypoxia    Also with history of asthma  Stable on Symbicort daily and albuterol prn  No signs of infection or exacerbation    Asthma Control Test  In the past 4  weeks, how much of the time did your asthma keep you from getting as much done at work, school or at home?: None of the time  During the past 4 weeks, how often have you had shortness of breath?: Not at all  During the past 4 weeks, how often did your asthma symptoms (wheezing, couging, shortness of breath, chest tightness or pain) wake you up at night or earlier than usual in the morning?: Not at all  During the past 4 weeks, how often have you used your rescue inhaler or nebulizer medication (such as albuterol)?: Not at all  How would you rate your asthma control during the past 4 weeks?: Well controlled  If your score is 19 or less, your asthma may not be under  control: 24     EPWORTH SLEEPINESS SCALE 1/26/2021   Sitting and reading 3   Watching TV 3   Sitting, inactive in a public place (e.g. a theatre or a meeting) 3   As a passenger in a car for an hour without a break 3   Lying down to rest in the afternoon when circumstances permit 3   Sitting and talking to someone 3   Sitting quietly after a lunch without alcohol 3   In a car, while stopped for a few minutes in traffic 3   Total score 24       Last visit with Dr. Rainey 8/2021:  Ms. Betts is 51 y.o.   Follow-up visit.  Last visit 10/27/2020  Here to review Asthma  Recent Sleeve gastrectomy  Lost weight from 210lb to 189lb  Refilled meds  Cont SYMBICORT  Resolved nocturnal eating    Has  OXYGEN for QHS  CXR was unremarkable  Will need ONSAT next visit   COVID vacination  Immunizations current  Has circardian dysrhythm      Immunization History   Administered Date(s) Administered    COVID-19, MRNA, LN-S, PF (MODERNA FULL 0.5 ML DOSE) 04/26/2021    Influenza 10/01/2018    Influenza - Quadrivalent - PF *Preferred* (6 months and older) 12/21/2017, 09/24/2020    Pneumococcal Polysaccharide - 23 Valent 01/31/2017    Tdap 12/21/2017      Tobacco Use: Low Risk     Smoking Tobacco Use: Never Smoker    Smokeless Tobacco Use: Never Used      Past Medical History:   Diagnosis Date    Abnormal Pap smear of cervix     HPV genital warts    Anemia     Anxiety     Arthritis     Asthma 10/11/2016    Bipolar 1 disorder     Diabetes mellitus, type 2     Dyslipidemia associated with type 2 diabetes mellitus 5/13/2019    General anesthetics causing adverse effect in therapeutic use     Genital warts     GERD (gastroesophageal reflux disease)     Herpes simplex virus (HSV) infection     Hyperlipidemia     Hypertension     Hypertension complicating diabetes 5/5/2019    Migraine with aura and without status migrainosus, not intractable 3/21/2016    Mild persistent asthma without complication 10/11/2016    Morbid  "obesity with body mass index (BMI) of 45.0 to 49.9 in adult 8/3/2017    Obstructive sleep apnea     ALEN (obstructive sleep apnea)     2L per N/C q HS    Schizoaffective disorder, bipolar type 4/25/2019    Seasonal allergic rhinitis due to pollen 5/13/2019    Type 2 diabetes mellitus with hyperglycemia, with long-term current use of insulin 12/21/2017    Type 2 diabetes mellitus with hyperglycemia, without long-term current use of insulin 12/21/2017      Current Outpatient Medications on File Prior to Visit   Medication Sig Dispense Refill    amLODIPine (NORVASC) 10 MG tablet Take 1 tablet (10 mg total) by mouth once daily. 30 tablet 11    amLODIPine (NORVASC) 5 MG tablet Take 1 tablet (5 mg total) by mouth every evening. 30 tablet 11    aspirin (ECOTRIN) 81 MG EC tablet Take 81 mg by mouth once daily.      BD INSULIN SYRINGE ULTRA-FINE 1/2 mL 30 gauge x 1/2" Syrg   0    blood sugar diagnostic (ONETOUCH ULTRA TEST) Strp Check blood glucose 4 times daily as directed and as needed (dispense insurance preferred brand or patient choice) 200 each 5    blood sugar diagnostic Strp 1 each by Misc.(Non-Drug; Combo Route) route 4 (four) times daily. [facility] Glucose Test Strips 400 strip 3    blood-glucose meter Misc Use as directed (Patient taking differently: Use as directed) 1 each 0    budesonide-formoterol 160-4.5 mcg (SYMBICORT) 160-4.5 mcg/actuation HFAA Inhale 2 puffs into the lungs every 12 (twelve) hours. Controller : Use spacer 10.2 g 11    clonazePAM (KLONOPIN) 1 MG tablet Take 1 tablet (1 mg total) by mouth once daily. 30 tablet 1    cyclobenzaprine (FLEXERIL) 10 MG tablet Take 0.5-1 tablets (5-10 mg total) by mouth 2 (two) times daily as needed for Muscle spasms. May cause drowsiness. 90 tablet 0    cycloSPORINE 0.05 % Drop Place 1 drop into both eyes 2 (two) times daily. 5.5 mL 11    diclofenac sodium (VOLTAREN) 1 % Gel Apply 2 grams topically 4 (four) times daily as needed. 5 each 0    " docusate sodium (COLACE) 100 MG capsule Take 1 capsule (100 mg total) by mouth 2 (two) times daily. 180 capsule 0    DULoxetine (CYMBALTA) 30 MG capsule Take 1 capsule (30 mg total) by mouth every evening. 30 capsule 1    fenofibrate (TRICOR) 54 MG tablet Take 1 tablet (54 mg total) by mouth once daily. 30 tablet 3    fish oil-omega-3 fatty acids 300-1,000 mg capsule Take 1 capsule by mouth once daily.      flash glucose scanning reader (FREESTYLE AMY 2 READER) Misc Use as directed to check blood sugar 1 each 1    frovatriptan (FROVA) 2.5 MG tablet Take 1 tablet at onset of acute migraine. May take 1 more tablet 2 hours later if needed. (MAX 2 TABLET PER DAY. MAX 4 TABLETS PER WEEK.) 9 tablet 1    furosemide (LASIX) 20 MG tablet Take 1 tablet (20 mg total) by mouth once daily. 30 tablet 11    insulin aspart U-100 (NOVOLOG FLEXPEN U-100 INSULIN) 100 unit/mL (3 mL) InPn pen Inject 10 Units into the skin 3 (three) times daily before meals. Use based on sliding scale 15 mL 3    insulin glargine U-300 conc (TOUJEO MAX U-300 SOLOSTAR) 300 unit/mL (3 mL) insulin pen Inject 90 Units into the skin every evening. 4 pen 3    lancets (ONETOUCH DELICA PLUS LANCET) 30 gauge Misc Use as directed 3 times daily (Patient taking differently: Use as directed 3 times daily) 100 each 11    LIDOcaine-prilocaine (EMLA) cream Apply topically up to 4 times per day to affected area for pain relief (Patient taking differently: Apply topically up to 4 times per day to affected area for pain relief) 60 g 0    lurasidone (LATUDA) 60 mg Tab tablet Take 1 tab by mouth daily with 350 calories 30 tablet 0    magnesium oxide (MAG-OX) 400 mg (241.3 mg magnesium) tablet Take 1 tablet (400 mg total) by mouth once daily. 90 tablet 3    metFORMIN (GLUCOPHAGE) 1000 MG tablet Take 1 tablet (1,000 mg total) by mouth 2 (two) times daily with meals. 180 tablet 0    milnacipran (SAVELLA) 25 mg Tab tablet Take 1 tablet (25 mg total) by mouth 2  "(two) times daily. 60 tablet 5    MULTIVIT,CALC,MINS/IRON/FOLIC (DAILY MULTIPLE FOR WOMEN ORAL) Take 1 tablet by mouth once daily.      pen needle, diabetic (BD ULTRA-FINE MINI PEN NEEDLE) 31 gauge x 3/16" Ndle Use 5 a day 200 each 3    spironolactone (ALDACTONE) 25 MG tablet Take 1 tablet (25 mg total) by mouth once daily. 30 tablet 6    valsartan (DIOVAN) 320 MG tablet Take 1 tablet (320 mg total) by mouth once daily. 90 tablet 3    methylPREDNISolone (MEDROL DOSEPACK) 4 mg tablet Take as directed (Patient not taking: Reported on 3/14/2022) 21 each 0    pen needle, diabetic (BD ULTRA-FINE MINI PEN NEEDLE) 31 gauge x 3/16" Ndle Use one needle 5 times a day (Patient not taking: Reported on 3/14/2022) 200 each 3    [DISCONTINUED] glucagon, human recombinant, (GLUCAGON EMERGENCY KIT, HUMAN,) 1 mg SolR Inject 1 mg into the muscle as needed. Emergency use 1 each 1     No current facility-administered medications on file prior to visit.        Review of Systems   Constitutional: Positive for fatigue. Negative for fever, weight loss, appetite change and weakness.   HENT: Negative for postnasal drip, rhinorrhea, sinus pressure, trouble swallowing and congestion.    Respiratory: Positive for apnea, snoring and somnolence. Negative for cough, sputum production, choking, chest tightness, shortness of breath, wheezing and dyspnea on extertion.    Cardiovascular: Negative for chest pain and leg swelling.   Musculoskeletal: Negative for arthralgias, gait problem and joint swelling.   Gastrointestinal: Negative for nausea, vomiting and abdominal pain.   Neurological: Negative for dizziness, weakness and headaches.   All other systems reviewed and are negative.      Objective:       Vitals:    03/14/22 1102   BP: (!) 130/90   Pulse: 91   Resp: 16   SpO2: 98%   Weight: 83.3 kg (183 lb 12.1 oz)   Height: 4' 8" (1.422 m)       Physical Exam   Constitutional: She is oriented to person, place, and time. She appears " well-developed and well-nourished. No distress. She is obese.   HENT:   Head: Normocephalic.   Mouth/Throat: Oropharynx is clear and moist.   Cardiovascular: Normal rate and regular rhythm.   Pulmonary/Chest: Effort normal and breath sounds normal. No respiratory distress. She has no wheezes. She has no rhonchi. She has no rales.   Musculoskeletal:         General: No edema.      Cervical back: Normal range of motion and neck supple.   Lymphadenopathy: No supraclavicular adenopathy is present.     She has no cervical adenopathy.   Neurological: She is alert and oriented to person, place, and time. Gait normal.   Skin: Skin is warm and dry.   Psychiatric:   Tearful at time of exam, received photos of her late sister at the start of her appointment   Vitals reviewed.    Personal Diagnostic Review    US Retroperitoneal Complete (Kidney and  Narrative: EXAMINATION:  US RETROPERITONEAL COMPLETE    CLINICAL HISTORY:  Calculus of kidney    TECHNIQUE:  Ultrasound of the kidneys and urinary bladder was performed including color flow and Doppler evaluation of the kidneys.    COMPARISON:  November 2019    FINDINGS:  Right kidney: The right kidney measures 11.6 cm. No cortical thinning. No loss of corticomedullary distinction. Resistive index measures 0.67.  No mass. No renal stone. No hydronephrosis.    Left kidney: The left kidney measures 10.2 cm. No cortical thinning. No loss of corticomedullary distinction. Resistive index measures 0.52.  No mass. No renal stone. No hydronephrosis.    The bladder is partially distended at the time of scanning and has an unremarkable appearance.    Hepatic steatosis is incidentally noted.  Impression: No significant abnormality.    Electronically signed by: Pravin Pimentel MD  Date:    02/21/2022  Time:    09:24  X-Ray Abdomen AP 1 View  Narrative: EXAMINATION:  XR ABDOMEN AP 1 VIEW    CLINICAL HISTORY:  Calculus of kidney    TECHNIQUE:  AP View(s) of the abdomen was  "performed.    COMPARISON:  07/22/2021    FINDINGS:  Suture material in the left upper quadrant is noted.  Visualized lung bases are clear.  Bones are intact.  Moderate retained fecal matter in the colon.  Bowel gas pattern is nonobstructed.  Small pelvic phleboliths are present.  No pathologic calcifications are visualized.  Impression: As above    Electronically signed by: Pravin Pimentel MD  Date:    02/21/2022  Time:    08:39          Assessment/Plan:       Problem List Items Addressed This Visit        Psychiatric    Schizoaffective disorder, bipolar type (Chronic)     Followed by Dr. Young  Multiple medications complicating fatigue              Pulmonary    Moderate persistent asthma without complication     Stable on symbicort              Cardiac/Vascular    Hypertriglyceridemia (Chronic)      F/u regularly with PCP             Hypertension complicating diabetes (Chronic)     Norvasc, 130/90, F/u regularly with PCP                Endocrine    BMI 40.0-44.9, adult     Weight loss and exercise to improve overall health.                Other    Obesity hypoventilation syndrome     Continue oxygen with sleep  Weight loss and exercise to improve overall health.             Circadian rhythm disorder    Hypersomnolence disorder - Primary     Repeat PSG with MSLT  Chest X Ray today  Follow up with Dr. Rainey  Sleep diary for 2 weeks  SDI           Relevant Orders    Polysomnography with [facility]        Patient was educated about the symptoms and signs of drowsy driving and about effective countermeasures. Symptoms of drowsy driving include difficulty focusing, frequent blinking, heavy eyelids, daydreaming, wandering/disconnected thoughts, difficulty remembering the last few miles driven (sometimes called "automatic behavior") or missing exits and street signs, frequent yawning, rubbing eyes, difficulty keeping head up, drifting from meredith to meredith, tailgating or hitting a shoulder, and feeling restless and irritable " .  Patient was made  aware that the ability to self-rate sleepiness and performance is unreliable . Although performance continues to decline with cumulative days of sleep deprivation, subjective ratings of sleepiness tend to level off after the first few days in laboratory conditions of sleep deprivation , and drivers are often unaware of their own level of sleepiness .  Rather than driving while drowsy, patient was told to use other modes of transportation, such as ride sharing, public transportation, taxis, or walking. Importantly, drivers should be advised to plan ahead so that they avoid driving during times of day when they are likely to be sleepy, such as mid-afternoon, late at night, or after a period of sleep deprivation; to keep their trips short (under 20 minutes); and to use alternative modes of transportation.  Patient was warned about the risk of driving while drowsy.  Patient was instructed that patients who are considered high-risk (eg, those with excessive daytime sleepiness and a history of a drowsy driving crash or near miss) should be warned not to drive until therapy has been instituted and proven effective.    PSG with [facility]SL[facility]. Sleep hygiene. Sleep diary, SDI. Follow up with Dr. Rainey next available. Continue Symbicort.      Discussed diagnosis, its evaluation, treatment and usual course. All questions answered.    Patient verbalized understanding of plan and left in no acute distress    Thank you for the courtesy of participating in the care of this patient    Analisa Bacon PA-C

## 2022-03-16 ENCOUNTER — OFFICE VISIT (OUTPATIENT)
Dept: CARDIOLOGY | Facility: CLINIC | Age: 52
End: 2022-03-16
Payer: MEDICAID

## 2022-03-16 VITALS
HEART RATE: 90 BPM | SYSTOLIC BLOOD PRESSURE: 118 MMHG | OXYGEN SATURATION: 98 % | DIASTOLIC BLOOD PRESSURE: 64 MMHG | BODY MASS INDEX: 41.01 KG/M2 | HEIGHT: 56 IN | WEIGHT: 182.31 LBS

## 2022-03-16 DIAGNOSIS — I15.2 HYPERTENSION COMPLICATING DIABETES: Primary | Chronic | ICD-10-CM

## 2022-03-16 DIAGNOSIS — E11.42 DIABETIC POLYNEUROPATHY ASSOCIATED WITH TYPE 2 DIABETES MELLITUS: Chronic | ICD-10-CM

## 2022-03-16 DIAGNOSIS — E78.1 HYPERTRIGLYCERIDEMIA: Chronic | ICD-10-CM

## 2022-03-16 DIAGNOSIS — E11.69 DYSLIPIDEMIA ASSOCIATED WITH TYPE 2 DIABETES MELLITUS: Chronic | ICD-10-CM

## 2022-03-16 DIAGNOSIS — G47.36 NOCTURNAL HYPOXEMIA DUE TO OBESITY: Chronic | ICD-10-CM

## 2022-03-16 DIAGNOSIS — F19.982 SLEEP PROBLEM CAUSED BY DRUG: ICD-10-CM

## 2022-03-16 DIAGNOSIS — F41.1 GENERALIZED ANXIETY DISORDER: Chronic | ICD-10-CM

## 2022-03-16 DIAGNOSIS — G43.109 MIGRAINE WITH AURA AND WITHOUT STATUS MIGRAINOSUS, NOT INTRACTABLE: Chronic | ICD-10-CM

## 2022-03-16 DIAGNOSIS — E66.9 NOCTURNAL HYPOXEMIA DUE TO OBESITY: Chronic | ICD-10-CM

## 2022-03-16 DIAGNOSIS — J45.909 ASTHMA, UNSPECIFIED ASTHMA SEVERITY, UNSPECIFIED WHETHER COMPLICATED, UNSPECIFIED WHETHER PERSISTENT: ICD-10-CM

## 2022-03-16 DIAGNOSIS — E11.59 HYPERTENSION COMPLICATING DIABETES: Primary | Chronic | ICD-10-CM

## 2022-03-16 DIAGNOSIS — F25.0 SCHIZOAFFECTIVE DISORDER, BIPOLAR TYPE: Chronic | ICD-10-CM

## 2022-03-16 DIAGNOSIS — R94.31 ABNORMAL ECG: ICD-10-CM

## 2022-03-16 DIAGNOSIS — E78.5 DYSLIPIDEMIA ASSOCIATED WITH TYPE 2 DIABETES MELLITUS: Chronic | ICD-10-CM

## 2022-03-16 DIAGNOSIS — F31.5: ICD-10-CM

## 2022-03-16 PROCEDURE — 3008F PR BODY MASS INDEX (BMI) DOCUMENTED: ICD-10-PCS | Mod: CPTII,,, | Performed by: INTERNAL MEDICINE

## 2022-03-16 PROCEDURE — 99214 OFFICE O/P EST MOD 30 MIN: CPT | Mod: S$PBB,,, | Performed by: INTERNAL MEDICINE

## 2022-03-16 PROCEDURE — 3044F HG A1C LEVEL LT 7.0%: CPT | Mod: CPTII,,, | Performed by: INTERNAL MEDICINE

## 2022-03-16 PROCEDURE — 1159F MED LIST DOCD IN RCRD: CPT | Mod: CPTII,,, | Performed by: INTERNAL MEDICINE

## 2022-03-16 PROCEDURE — 3044F PR MOST RECENT HEMOGLOBIN A1C LEVEL <7.0%: ICD-10-PCS | Mod: CPTII,,, | Performed by: INTERNAL MEDICINE

## 2022-03-16 PROCEDURE — 99214 PR OFFICE/OUTPT VISIT, EST, LEVL IV, 30-39 MIN: ICD-10-PCS | Mod: S$PBB,,, | Performed by: INTERNAL MEDICINE

## 2022-03-16 PROCEDURE — 3061F PR NEG MICROALBUMINURIA RESULT DOCUMENTED/REVIEW: ICD-10-PCS | Mod: CPTII,,, | Performed by: INTERNAL MEDICINE

## 2022-03-16 PROCEDURE — 3061F NEG MICROALBUMINURIA REV: CPT | Mod: CPTII,,, | Performed by: INTERNAL MEDICINE

## 2022-03-16 PROCEDURE — 99999 PR PBB SHADOW E&M-EST. PATIENT-LVL V: ICD-10-PCS | Mod: PBBFAC,,, | Performed by: INTERNAL MEDICINE

## 2022-03-16 PROCEDURE — 3074F PR MOST RECENT SYSTOLIC BLOOD PRESSURE < 130 MM HG: ICD-10-PCS | Mod: CPTII,,, | Performed by: INTERNAL MEDICINE

## 2022-03-16 PROCEDURE — 3074F SYST BP LT 130 MM HG: CPT | Mod: CPTII,,, | Performed by: INTERNAL MEDICINE

## 2022-03-16 PROCEDURE — 3072F LOW RISK FOR RETINOPATHY: CPT | Mod: CPTII,,, | Performed by: INTERNAL MEDICINE

## 2022-03-16 PROCEDURE — 99999 PR PBB SHADOW E&M-EST. PATIENT-LVL V: CPT | Mod: PBBFAC,,, | Performed by: INTERNAL MEDICINE

## 2022-03-16 PROCEDURE — 3066F PR DOCUMENTATION OF TREATMENT FOR NEPHROPATHY: ICD-10-PCS | Mod: CPTII,,, | Performed by: INTERNAL MEDICINE

## 2022-03-16 PROCEDURE — 99215 OFFICE O/P EST HI 40 MIN: CPT | Mod: PBBFAC | Performed by: INTERNAL MEDICINE

## 2022-03-16 PROCEDURE — 3078F PR MOST RECENT DIASTOLIC BLOOD PRESSURE < 80 MM HG: ICD-10-PCS | Mod: CPTII,,, | Performed by: INTERNAL MEDICINE

## 2022-03-16 PROCEDURE — 3066F NEPHROPATHY DOC TX: CPT | Mod: CPTII,,, | Performed by: INTERNAL MEDICINE

## 2022-03-16 PROCEDURE — 3078F DIAST BP <80 MM HG: CPT | Mod: CPTII,,, | Performed by: INTERNAL MEDICINE

## 2022-03-16 PROCEDURE — 1159F PR MEDICATION LIST DOCUMENTED IN MEDICAL RECORD: ICD-10-PCS | Mod: CPTII,,, | Performed by: INTERNAL MEDICINE

## 2022-03-16 PROCEDURE — 3072F PR LOW RISK FOR RETINOPATHY: ICD-10-PCS | Mod: CPTII,,, | Performed by: INTERNAL MEDICINE

## 2022-03-16 PROCEDURE — 3008F BODY MASS INDEX DOCD: CPT | Mod: CPTII,,, | Performed by: INTERNAL MEDICINE

## 2022-03-16 NOTE — PROGRESS NOTES
"Subjective:   Patient ID:  Yolanda Betts is a 51 y.o. female who presents for follow up of Hypertension complicating diabetes      HPI  4/22/2020  A 48 yo female with htn diabetes obesity o2 desaturation nocturnal o2 therapy migraine asthma is following on htn. Her bp is improved she is more complaint with slat diabetes and meds intake. The dose adjustment has helped with the bp buy taking extra 5 mg of amlodipine.  Has uti and had shortness of breath necessitating an er visits. Feels much better. Has  Less  shortness of breath she got hydrated. Has no leg edema her nebs has helped control symptoms has no other issues clinically.htn better control no fluctuation hr improved.      1/6/2021  Here for preop clearance for carpal tunnel. She is asymptomatic cardiac wise she works as  has no complaints of chest pain or shortness of breath had gallbladder surgery last year no complication has been dropping items from hand  Has numbness and tingling she needs bilateral carpal tunnel done . She is also looking at having bariatric surgery.      3/11/2021 YVES HUYNH  Ms. Betts is a 50 year old female patient whose current medical conditions include HTN, obesity, hypertriglyceridemia, DM type II, obesity, hypoventilation syndrome on nocturnal O2, and schizoaffective disorder who presents today for routine follow-up. She returns today and states she is doing clinicalli.y well. Main issue is still bilateral hand pain/tingling/nubmness. Scheduled for CTR in April, previously cleared by Dr. Mccormick. No change in status since last visit. No chest pain, heaviness, or tightness. No symptoms concerning for angina. No lightheadedness, dizziness, near syncope, or syncope. Does admit to occasional palpitations, notices it when she is stressed or anxious, not especially bothersome. No s/s suggestive of CHF. Does report getting winded if pollen is "bad outside". BP well-controlled. Patient is compliant with her " medications. Still works as an . Needs updated labs, orders placed.     9/15/2021   FEELS WELL COMPLIANT WITH MEDS DIET REVIEW OF DIGITAL; MEDICINE SHOWED GOOD CONTROL BP DIABETES. . MTZ SNO LEG SWELLING FORGETS TO USE O2 THERAPY CONTINOUSLY. HAS NO TIA CLAUDICVATION PALPITATIONS. HAS FLUCTUATION IN WEIGHT TRIES TO BE COMPLIANT TO LOSE WEIGHT.     3/16/2022  Here for f/u has had a sleeve  On adkin's diet has been losing weight has been exercising . Has been having swimming sensation in her head when lying down moving head or walking on treadmill. She has narcolepsy getting her sleep eval done this month she is on o2 therapy at [facility]. Has no other complaints her lipids are improved remarkably.   Past Medical History:   Diagnosis Date    Abnormal Pap smear of cervix     HPV genital warts    Anemia     Anxiety     Arthritis     Asthma 10/11/2016    Bipolar 1 disorder     Diabetes mellitus, type 2     Dyslipidemia associated with type 2 diabetes mellitus 5/13/2019    General anesthetics causing adverse effect in therapeutic use     Genital warts     GERD (gastroesophageal reflux disease)     Herpes simplex virus (HSV) infection     Hyperlipidemia     Hypertension     Hypertension complicating diabetes 5/5/2019    Migraine with aura and without status migrainosus, not intractable 3/21/2016    Mild persistent asthma without complication 10/11/2016    Morbid obesity with body mass index (BMI) of 45.0 to 49.9 in adult 8/3/2017    Obstructive sleep apnea     ALEN (obstructive sleep apnea)     2L per N/C q HS    Schizoaffective disorder, bipolar type 4/25/2019    Seasonal allergic rhinitis due to pollen 5/13/2019    Type 2 diabetes mellitus with hyperglycemia, with long-term current use of insulin 12/21/2017    Type 2 diabetes mellitus with hyperglycemia, without long-term current use of insulin 12/21/2017       Past Surgical History:   Procedure Laterality Date    abcess removed right  "back      BELT ABDOMINOPLASTY      BREAST SURGERY Bilateral 1996    Reduction     SECTION      X 2    COLONOSCOPY      COLONOSCOPY N/A 2018    Procedure: colonoscopy for iron deficiency anemia;  Surgeon: Frankie Sanchez MD;  Location: [facility] ENDO;  Service: Endoscopy;  Laterality: N/A;    COLONOSCOPY N/A 2018    Procedure: COLONOSCOPY;  Surgeon: Frankie Sanchez MD;  Location: [facility] ENDO;  Service: Endoscopy;  Laterality: N/A;    HYSTERECTOMY      w/ BSO; hypermenorrhea    MOUTH SURGERY      OOPHORECTOMY      hyst/bso, hypermenorrhea    ROBOT-ASSISTED CHOLECYSTECTOMY USING DA DARI XI N/A 1/3/2020    Procedure: XI ROBOTIC CHOLECYSTECTOMY;  Surgeon: Damir Driscoll MD;  Location: [facility] OR;  Service: General;  Laterality: N/A;    TOTAL REDUCTION MAMMOPLASTY      TUBAL LIGATION         Social History     Tobacco Use    Smoking status: Never Smoker    Smokeless tobacco: Never Used   Substance Use Topics    Alcohol use: Yes     Alcohol/week: 0.0 standard drinks     Comment: socially  No alcohol 72h prior to sx    Drug use: No       Family History   Problem Relation Age of Onset    Diabetes Mother     Hyperlipidemia Mother     Hypertension Mother     Asthma Mother     COPD Mother     Glaucoma Mother     Thyroid disease Mother     Anesthesia problems Mother         "almost had a cardiac arrest" , blood clots    Hypertension Father     Hyperlipidemia Father     Cancer Father         Brain, lung, liver, kidney    Heart disease Maternal Grandmother     Hyperlipidemia Maternal Grandmother     Hypertension Maternal Grandmother     Cataracts Maternal Grandmother     Diabetes Maternal Grandmother     Heart disease Maternal Grandfather     Hyperlipidemia Maternal Grandfather     Hypertension Maternal Grandfather     Glaucoma Maternal Grandfather     Cancer Maternal Grandfather     Cataracts Maternal Grandfather     Macular degeneration Maternal Grandfather  " "   Diabetes Maternal Grandfather     Heart disease Paternal Grandmother     Hyperlipidemia Paternal Grandmother     Hypertension Paternal Grandmother     Cataracts Paternal Grandmother     Heart disease Paternal Grandfather     Hyperlipidemia Paternal Grandfather     Hypertension Paternal Grandfather     Cataracts Paternal Grandfather     Breast cancer Maternal Cousin     Breast cancer Maternal Cousin     Breast cancer Maternal Cousin     Breast cancer Maternal Cousin        Current Outpatient Medications   Medication Sig    amLODIPine (NORVASC) 10 MG tablet Take 1 tablet (10 mg total) by mouth once daily.    amLODIPine (NORVASC) 5 MG tablet Take 1 tablet (5 mg total) by mouth every evening.    aspirin (ECOTRIN) 81 MG EC tablet Take 81 mg by mouth once daily.    BD INSULIN SYRINGE ULTRA-FINE 1/2 mL 30 gauge x 1/2" Syrg     blood sugar diagnostic (ONETOUCH ULTRA TEST) Strp Check blood glucose 4 times daily as directed and as needed (dispense insurance preferred brand or patient choice)    blood sugar diagnostic Strp 1 each by Misc.(Non-Drug; Combo Route) route 4 (four) times daily. [facility] Glucose Test Strips    blood-glucose meter Misc Use as directed (Patient taking differently: Use as directed)    budesonide-formoterol 160-4.5 mcg (SYMBICORT) 160-4.5 mcg/actuation HFAA Inhale 2 puffs into the lungs every 12 (twelve) hours. Controller : Use spacer    clonazePAM (KLONOPIN) 1 MG tablet Take 1 tablet (1 mg total) by mouth once daily.    cyclobenzaprine (FLEXERIL) 10 MG tablet Take 0.5-1 tablets (5-10 mg total) by mouth 2 (two) times daily as needed for Muscle spasms. May cause drowsiness.    cycloSPORINE 0.05 % Drop Place 1 drop into both eyes 2 (two) times daily.    diclofenac sodium (VOLTAREN) 1 % Gel Apply 2 grams topically 4 (four) times daily as needed.    docusate sodium (COLACE) 100 MG capsule Take 1 capsule (100 mg total) by mouth 2 (two) times daily.    DULoxetine (CYMBALTA) 30 MG " "capsule Take 1 capsule (30 mg total) by mouth every evening.    fenofibrate (TRICOR) 54 MG tablet Take 1 tablet (54 mg total) by mouth once daily.    fish oil-omega-3 fatty acids 300-1,000 mg capsule Take 1 capsule by mouth once daily.    flash glucose scanning reader (FREESTYLE AMY 2 READER) Misc Use as directed to check blood sugar    frovatriptan (FROVA) 2.5 MG tablet Take 1 tablet at onset of acute migraine. May take 1 more tablet 2 hours later if needed. (MAX 2 TABLET PER DAY. MAX 4 TABLETS PER WEEK.)    furosemide (LASIX) 20 MG tablet Take 1 tablet (20 mg total) by mouth once daily.    insulin aspart U-100 (NOVOLOG FLEXPEN U-100 INSULIN) 100 unit/mL (3 mL) InPn pen Inject 10 Units into the skin 3 (three) times daily before meals. Use based on sliding scale    insulin glargine U-300 conc (TOUJEO MAX U-300 SOLOSTAR) 300 unit/mL (3 mL) insulin pen Inject 90 Units into the skin every evening.    lancets (ONETOUCH DELICA PLUS LANCET) 30 gauge Misc Use as directed 3 times daily (Patient taking differently: Use as directed 3 times daily)    LIDOcaine-prilocaine (EMLA) cream Apply topically up to 4 times per day to affected area for pain relief (Patient taking differently: Apply topically up to 4 times per day to affected area for pain relief)    lurasidone (LATUDA) 60 mg Tab tablet Take 1 tab by mouth daily with 350 calories    magnesium oxide (MAG-OX) 400 mg (241.3 mg magnesium) tablet Take 1 tablet (400 mg total) by mouth once daily.    metFORMIN (GLUCOPHAGE) 1000 MG tablet Take 1 tablet (1,000 mg total) by mouth 2 (two) times daily with meals.    milnacipran (SAVELLA) 25 mg Tab tablet Take 1 tablet (25 mg total) by mouth 2 (two) times daily.    MULTIVIT,CALC,MINS/IRON/FOLIC (DAILY MULTIPLE FOR WOMEN ORAL) Take 1 tablet by mouth once daily.    pen needle, diabetic (BD ULTRA-FINE MINI PEN NEEDLE) 31 gauge x 3/16" Ndle Use 5 a day    spironolactone (ALDACTONE) 25 MG tablet Take 1 tablet (25 mg " "total) by mouth once daily.    valsartan (DIOVAN) 320 MG tablet Take 1 tablet (320 mg total) by mouth once daily.    methylPREDNISolone (MEDROL DOSEPACK) 4 mg tablet Take as directed (Patient not taking: No sig reported)    pen needle, diabetic (BD ULTRA-FINE MINI PEN NEEDLE) 31 gauge x 3/16" Ndle Use one needle 5 times a day (Patient not taking: No sig reported)     No current facility-administered medications for this visit.     Current Outpatient Medications on File Prior to Visit   Medication Sig    amLODIPine (NORVASC) 10 MG tablet Take 1 tablet (10 mg total) by mouth once daily.    amLODIPine (NORVASC) 5 MG tablet Take 1 tablet (5 mg total) by mouth every evening.    aspirin (ECOTRIN) 81 MG EC tablet Take 81 mg by mouth once daily.    BD INSULIN SYRINGE ULTRA-FINE 1/2 mL 30 gauge x 1/2" Syrg     blood sugar diagnostic (ONETOUCH ULTRA TEST) Strp Check blood glucose 4 times daily as directed and as needed (dispense insurance preferred brand or patient choice)    blood sugar diagnostic Strp 1 each by Misc.(Non-Drug; Combo Route) route 4 (four) times daily. [facility] Glucose Test Strips    blood-glucose meter Misc Use as directed (Patient taking differently: Use as directed)    budesonide-formoterol 160-4.5 mcg (SYMBICORT) 160-4.5 mcg/actuation HFAA Inhale 2 puffs into the lungs every 12 (twelve) hours. Controller : Use spacer    clonazePAM (KLONOPIN) 1 MG tablet Take 1 tablet (1 mg total) by mouth once daily.    cyclobenzaprine (FLEXERIL) 10 MG tablet Take 0.5-1 tablets (5-10 mg total) by mouth 2 (two) times daily as needed for Muscle spasms. May cause drowsiness.    cycloSPORINE 0.05 % Drop Place 1 drop into both eyes 2 (two) times daily.    diclofenac sodium (VOLTAREN) 1 % Gel Apply 2 grams topically 4 (four) times daily as needed.    docusate sodium (COLACE) 100 MG capsule Take 1 capsule (100 mg total) by mouth 2 (two) times daily.    DULoxetine (CYMBALTA) 30 MG capsule Take 1 capsule (30 mg " "total) by mouth every evening.    fenofibrate (TRICOR) 54 MG tablet Take 1 tablet (54 mg total) by mouth once daily.    fish oil-omega-3 fatty acids 300-1,000 mg capsule Take 1 capsule by mouth once daily.    flash glucose scanning reader (FREESTYLE AMY 2 READER) Misc Use as directed to check blood sugar    frovatriptan (FROVA) 2.5 MG tablet Take 1 tablet at onset of acute migraine. May take 1 more tablet 2 hours later if needed. (MAX 2 TABLET PER DAY. MAX 4 TABLETS PER WEEK.)    furosemide (LASIX) 20 MG tablet Take 1 tablet (20 mg total) by mouth once daily.    insulin aspart U-100 (NOVOLOG FLEXPEN U-100 INSULIN) 100 unit/mL (3 mL) InPn pen Inject 10 Units into the skin 3 (three) times daily before meals. Use based on sliding scale    insulin glargine U-300 conc (TOUJEO MAX U-300 SOLOSTAR) 300 unit/mL (3 mL) insulin pen Inject 90 Units into the skin every evening.    lancets (ONETOUCH DELICA PLUS LANCET) 30 gauge Misc Use as directed 3 times daily (Patient taking differently: Use as directed 3 times daily)    LIDOcaine-prilocaine (EMLA) cream Apply topically up to 4 times per day to affected area for pain relief (Patient taking differently: Apply topically up to 4 times per day to affected area for pain relief)    lurasidone (LATUDA) 60 mg Tab tablet Take 1 tab by mouth daily with 350 calories    magnesium oxide (MAG-OX) 400 mg (241.3 mg magnesium) tablet Take 1 tablet (400 mg total) by mouth once daily.    metFORMIN (GLUCOPHAGE) 1000 MG tablet Take 1 tablet (1,000 mg total) by mouth 2 (two) times daily with meals.    milnacipran (SAVELLA) 25 mg Tab tablet Take 1 tablet (25 mg total) by mouth 2 (two) times daily.    MULTIVIT,CALC,MINS/IRON/FOLIC (DAILY MULTIPLE FOR WOMEN ORAL) Take 1 tablet by mouth once daily.    pen needle, diabetic (BD ULTRA-FINE MINI PEN NEEDLE) 31 gauge x 3/16" Ndle Use 5 a day    spironolactone (ALDACTONE) 25 MG tablet Take 1 tablet (25 mg total) by mouth once daily.    " "valsartan (DIOVAN) 320 MG tablet Take 1 tablet (320 mg total) by mouth once daily.    methylPREDNISolone (MEDROL DOSEPACK) 4 mg tablet Take as directed (Patient not taking: No sig reported)    pen needle, diabetic (BD ULTRA-FINE MINI PEN NEEDLE) 31 gauge x 3/16" Ndle Use one needle 5 times a day (Patient not taking: No sig reported)    [DISCONTINUED] glucagon, human recombinant, (GLUCAGON EMERGENCY KIT, HUMAN,) 1 mg SolR Inject 1 mg into the muscle as needed. Emergency use     No current facility-administered medications on file prior to visit.     Review of patient's allergies indicates:   Allergen Reactions    Codeine Itching    Hydromorphone Other (See Comments)     Can't wake up for long time if taken  Slow to wake up after surgery after receiving    Lyrica [pregabalin] Itching    Neuromuscular blockers, steroidal Hives     some    Latex, natural rubber Rash    Morphine Rash     itching    Norco [hydrocodone-acetaminophen] Itching, Rash and Hallucinations    Seconal [secobarbital sodium] Rash     itching    Tylox [oxycodone-acetaminophen] Rash       Review of Systems   Constitutional: Positive for weight loss. Negative for diaphoresis, malaise/fatigue and weight gain.   HENT: Negative for hoarse voice.    Eyes: Negative for double vision and visual disturbance.   Cardiovascular: Negative for chest pain, claudication, cyanosis, dyspnea on exertion, irregular heartbeat, leg swelling, near-syncope, orthopnea, palpitations, paroxysmal nocturnal dyspnea and syncope.   Respiratory: Negative for cough, hemoptysis, shortness of breath and snoring.    Hematologic/Lymphatic: Negative for bleeding problem. Does not bruise/bleed easily.   Skin: Negative for color change and poor wound healing.   Musculoskeletal: Negative for muscle cramps, muscle weakness and myalgias.   Gastrointestinal: Negative for bloating, abdominal pain, change in bowel habit, diarrhea, heartburn, hematemesis, hematochezia, melena and " nausea.   Neurological: Positive for dizziness and vertigo. Negative for excessive daytime sleepiness, headaches, light-headedness, loss of balance, numbness and weakness.   Psychiatric/Behavioral: Negative for memory loss. The patient does not have insomnia.    Allergic/Immunologic: Negative for hives.       Objective:   Physical Exam  Constitutional:       General: She is not in acute distress.     Appearance: Normal appearance. She is well-developed. She is not ill-appearing.   HENT:      Head: Normocephalic and atraumatic.   Eyes:      General: No scleral icterus.     Pupils: Pupils are equal, round, and reactive to light.   Neck:      Thyroid: No thyromegaly.      Vascular: Normal carotid pulses. No carotid bruit, hepatojugular reflux or JVD.      Trachea: No tracheal deviation.   Cardiovascular:      Rate and Rhythm: Normal rate and regular rhythm.      Pulses: Normal pulses.      Heart sounds: Normal heart sounds. No murmur heard.    No friction rub. No gallop.   Pulmonary:      Effort: Pulmonary effort is normal. No respiratory distress.      Breath sounds: Normal breath sounds. No wheezing, rhonchi or rales.   Chest:      Chest wall: No tenderness.   Abdominal:      General: Bowel sounds are normal. There is no abdominal bruit.      Palpations: Abdomen is soft. There is no hepatomegaly or pulsatile mass.      Tenderness: There is no abdominal tenderness.      Comments: Obese    Musculoskeletal:      Right shoulder: No deformity.      Cervical back: Normal range of motion and neck supple.   Skin:     General: Skin is warm and dry.      Findings: No erythema or rash.      Nails: There is no clubbing.   Neurological:      Mental Status: She is alert and oriented to person, place, and time.      Cranial Nerves: No cranial nerve deficit.      Coordination: Coordination normal.   Psychiatric:         Speech: Speech normal.         Behavior: Behavior normal.       Vitals:    03/16/22 0757 03/16/22 0800   BP:  "110/64 118/64   BP Location: Right arm Left arm   Patient Position: Sitting Sitting   BP Method: Medium (Manual) Medium (Manual)   Pulse: 90    SpO2: 98%    Weight: 82.7 kg (182 lb 5.1 oz)    Height: 4' 8" (1.422 m)      Lab Results   Component Value Date    CHOL 174 03/08/2022    CHOL 203 (H) 02/07/2022    CHOL 188 07/22/2021     Lab Results   Component Value Date    HDL 57 03/08/2022    HDL 48 02/07/2022    HDL 44 07/22/2021     Lab Results   Component Value Date    LDLCALC 98.2 03/08/2022    LDLCALC 122.4 02/07/2022    LDLCALC 96.8 07/22/2021     Lab Results   Component Value Date    TRIG 94 03/08/2022    TRIG 163 (H) 02/07/2022    TRIG 236 (H) 07/22/2021     Lab Results   Component Value Date    CHOLHDL 32.8 03/08/2022    CHOLHDL 23.6 02/07/2022    CHOLHDL 23.4 07/22/2021       Chemistry        Component Value Date/Time     03/08/2022 0735    K 4.4 03/08/2022 0735     03/08/2022 0735    CO2 28 03/08/2022 0735    BUN 13 03/08/2022 0735    CREATININE 0.7 03/08/2022 0735    GLU 73 03/08/2022 0735        Component Value Date/Time    CALCIUM 9.6 03/08/2022 0735    ALKPHOS 75 03/08/2022 0735    AST 13 03/08/2022 0735    ALT 15 03/08/2022 0735    BILITOT 0.2 03/08/2022 0735    ESTGFRAFRICA >60.0 03/08/2022 0735    EGFRNONAA >60.0 03/08/2022 0735          Lab Results   Component Value Date    TSH 1.730 03/24/2021     Lab Results   Component Value Date    INR 1.0 06/26/2020    INR 0.9 11/23/2016     Lab Results   Component Value Date    WBC 8.51 06/08/2021    HGB 11.0 (L) 06/08/2021    HCT 36.6 (L) 06/08/2021    MCV 90 06/08/2021     06/08/2021     BMP  Sodium   Date Value Ref Range Status   03/08/2022 140 136 - 145 mmol/L Final     Potassium   Date Value Ref Range Status   03/08/2022 4.4 3.5 - 5.1 mmol/L Final     Chloride   Date Value Ref Range Status   03/08/2022 103 95 - 110 mmol/L Final     CO2   Date Value Ref Range Status   03/08/2022 28 23 - 29 mmol/L Final     BUN   Date Value Ref Range Status "   03/08/2022 13 6 - 20 mg/dL Final     Creatinine   Date Value Ref Range Status   03/08/2022 0.7 0.5 - 1.4 mg/dL Final     Calcium   Date Value Ref Range Status   03/08/2022 9.6 8.7 - 10.5 mg/dL Final     Anion Gap   Date Value Ref Range Status   03/08/2022 9 8 - 16 mmol/L Final     eGFR if    Date Value Ref Range Status   03/08/2022 >60.0 >60 mL/min/1.73 m^2 Final     eGFR if non    Date Value Ref Range Status   03/08/2022 >60.0 >60 mL/min/1.73 m^2 Final     Comment:     Calculation used to obtain the estimated glomerular filtration  rate (eGFR) is the CKD-EPI equation.        CrCl cannot be calculated (Patient's most recent lab result is older than the maximum 7 days allowed.).  Lab Results   Component Value Date    HGBA1C 6.2 (H) 03/08/2022       Assessment:     1. Hypertension complicating diabetes    2. Diabetic polyneuropathy associated with type 2 diabetes mellitus    3. Migraine with aura and without status migrainosus, not intractable    4. Bipolar disord, crnt epsd depress, severe, w psych features    5. Generalized anxiety disorder    6. Sleep problem caused by drug    7. Schizoaffective disorder, bipolar type    8. Asthma, unspecified asthma severity, unspecified whether complicated, unspecified whether persistent    9. Abnormal ECG    10. Dyslipidemia associated with type 2 diabetes mellitus    11. Hypertriglyceridemia    12. Nocturnal hypoxemia due to obesity - WITHOUT ALEN      Has remarkable improvement with significant weight loss after surgery appropriate diet her lipids improved her diabetes controlled htn controlled she is exercising regularily. Has been complaint with meds.   Her symptoms are suggestive of vertigo she will reach out top ent.  Has question about stimulant for narcolepsy I see no contraindication but before that will need to await sleep study see if majority of symptoms improve with sleep apnea therapy  Plan:   Continue current therapy  Cardiac low  salt diet.  Risk factor modification and excercise program.  F/u in 6 months with lipid cmp ekg.  She will reach out to ent.

## 2022-03-17 ENCOUNTER — TELEPHONE (OUTPATIENT)
Dept: PRIMARY CARE CLINIC | Facility: CLINIC | Age: 52
End: 2022-03-17
Payer: MEDICAID

## 2022-03-17 NOTE — TELEPHONE ENCOUNTER
Placed call to get verbal blood pressure reading over the telephone patientgave verbal reading of 130/90 per patient she took medication on today   ----- Message from Sasha Sorenson MD sent at 3/16/2022  8:47 PM CDT -----  Please ask pt to give us latest BP  ----- Message -----  From: Analisa Bacon PA-C  Sent: 3/14/2022  11:31 AM CDT  To: Sasha Sorenson MD

## 2022-03-18 ENCOUNTER — TELEPHONE (OUTPATIENT)
Dept: RHEUMATOLOGY | Facility: CLINIC | Age: 52
End: 2022-03-18
Payer: MEDICAID

## 2022-03-18 ENCOUNTER — OFFICE VISIT (OUTPATIENT)
Dept: PSYCHIATRY | Facility: CLINIC | Age: 52
End: 2022-03-18
Payer: MEDICAID

## 2022-03-18 DIAGNOSIS — F41.1 GENERALIZED ANXIETY DISORDER: ICD-10-CM

## 2022-03-18 DIAGNOSIS — F43.21 GRIEF: ICD-10-CM

## 2022-03-18 DIAGNOSIS — F31.9 BIPOLAR I DISORDER, CURRENT EPISODE DEPRESSED: Primary | ICD-10-CM

## 2022-03-18 PROCEDURE — 3072F LOW RISK FOR RETINOPATHY: CPT | Mod: AJ,HB,CPTII, | Performed by: SOCIAL WORKER

## 2022-03-18 PROCEDURE — 3066F PR DOCUMENTATION OF TREATMENT FOR NEPHROPATHY: ICD-10-PCS | Mod: AJ,HB,CPTII, | Performed by: SOCIAL WORKER

## 2022-03-18 PROCEDURE — 90834 PR PSYCHOTHERAPY W/PATIENT, 45 MIN: ICD-10-PCS | Mod: AJ,HB,, | Performed by: SOCIAL WORKER

## 2022-03-18 PROCEDURE — 3072F PR LOW RISK FOR RETINOPATHY: ICD-10-PCS | Mod: AJ,HB,CPTII, | Performed by: SOCIAL WORKER

## 2022-03-18 PROCEDURE — 3044F HG A1C LEVEL LT 7.0%: CPT | Mod: AJ,HB,CPTII, | Performed by: SOCIAL WORKER

## 2022-03-18 PROCEDURE — 3044F PR MOST RECENT HEMOGLOBIN A1C LEVEL <7.0%: ICD-10-PCS | Mod: AJ,HB,CPTII, | Performed by: SOCIAL WORKER

## 2022-03-18 PROCEDURE — 3066F NEPHROPATHY DOC TX: CPT | Mod: AJ,HB,CPTII, | Performed by: SOCIAL WORKER

## 2022-03-18 PROCEDURE — 90834 PSYTX W PT 45 MINUTES: CPT | Mod: AJ,HB,, | Performed by: SOCIAL WORKER

## 2022-03-18 PROCEDURE — 3061F PR NEG MICROALBUMINURIA RESULT DOCUMENTED/REVIEW: ICD-10-PCS | Mod: AJ,HB,CPTII, | Performed by: SOCIAL WORKER

## 2022-03-18 PROCEDURE — 3061F NEG MICROALBUMINURIA REV: CPT | Mod: AJ,HB,CPTII, | Performed by: SOCIAL WORKER

## 2022-03-18 NOTE — PROGRESS NOTES
Individual Psychotherapy (PhD/LCSW)    3/18/2022    [place]  [place]         Therapeutic Intervention: Met with patient.  Outpatient - Insight oriented psychotherapy 45 min - CPT code 19310    Chief complaint/reason for encounter: depression and anxiety     Interval history and content of current session: Pt was in great spirits today.  She found a house and is closing at the end of this month.  She plans to move into it as soon as possible.  Her current living situation continues to deteriorate and she is starting to have a better grasp of the degree of dysfunction in boyfriend's family.  Pt also just realized that his 12 year old son is not able to read nor is her boyfriend.  This is something he has been able to hide from her for 10 years but now that they are living together she is recognizing it.  Pt shared that things with her own family are improving slowly.  The family pictures turned out really well and all being together was very restorative.   surprised them with super imposing pictures of  sister in some of the group shots.  Once she has her own house she plans to reach out to sisters youngest kids and invite them to stay with her whenever they want.  Still waiting brother in law's impending imprisonment.      Treatment plan:  · Target symptoms: depression, anxiety   · Why chosen therapy is appropriate versus another modality: relevant to diagnosis, patient responds to this modality, evidence based practice  · Outcome monitoring methods: self-report, observation  · Therapeutic intervention type: insight oriented psychotherapy, supportive psychotherapy    Risk parameters:  Patient reports no suicidal ideation  Patient reports no homicidal ideation  Patient reports no self-injurious behavior  Patient reports no violent behavior    Verbal deficits: None    Patient's response to intervention:  The patient's response to intervention is accepting.    Progress toward goals and other mental  status changes:  The patient's progress toward goals is good.    Diagnosis:     ICD-10-CM ICD-9-CM   1. Bipolar I disorder, current episode depressed  F31.9 296.50   2. Generalized anxiety disorder  F41.1 300.02   3. Grief  F43.21 309.0       Plan:  individual psychotherapy    Return to clinic: 2 weeks    Length of Service (minutes): Harish Stout   03/18/2022   9:00 AM

## 2022-03-21 ENCOUNTER — HOSPITAL ENCOUNTER (OUTPATIENT)
Dept: RADIOLOGY | Facility: HOSPITAL | Age: 52
Discharge: HOME | End: 2022-03-21
Attending: PHYSICIAN ASSISTANT
Payer: MEDICAID

## 2022-03-21 ENCOUNTER — OFFICE VISIT (OUTPATIENT)
Dept: RHEUMATOLOGY | Facility: CLINIC | Age: 52
End: 2022-03-21
Payer: MEDICAID

## 2022-03-21 VITALS
HEART RATE: 94 BPM | DIASTOLIC BLOOD PRESSURE: 85 MMHG | SYSTOLIC BLOOD PRESSURE: 122 MMHG | BODY MASS INDEX: 40.82 KG/M2 | HEIGHT: 56 IN | WEIGHT: 181.44 LBS

## 2022-03-21 DIAGNOSIS — M79.7 FIBROMYALGIA: ICD-10-CM

## 2022-03-21 DIAGNOSIS — M25.511 CHRONIC RIGHT SHOULDER PAIN: ICD-10-CM

## 2022-03-21 DIAGNOSIS — G89.29 CHRONIC RIGHT SHOULDER PAIN: ICD-10-CM

## 2022-03-21 DIAGNOSIS — M75.41 IMPINGEMENT SYNDROME OF RIGHT SHOULDER: ICD-10-CM

## 2022-03-21 DIAGNOSIS — M75.21 BICEPS TENDINITIS OF RIGHT SHOULDER: ICD-10-CM

## 2022-03-21 DIAGNOSIS — M75.41 IMPINGEMENT SYNDROME OF RIGHT SHOULDER: Primary | ICD-10-CM

## 2022-03-21 DIAGNOSIS — M75.31 CALCIFIC TENDINITIS OF RIGHT SHOULDER: ICD-10-CM

## 2022-03-21 PROCEDURE — 3079F DIAST BP 80-89 MM HG: CPT | Mod: CPTII,,, | Performed by: PHYSICIAN ASSISTANT

## 2022-03-21 PROCEDURE — 3061F NEG MICROALBUMINURIA REV: CPT | Mod: CPTII,,, | Performed by: PHYSICIAN ASSISTANT

## 2022-03-21 PROCEDURE — 1159F PR MEDICATION LIST DOCUMENTED IN MEDICAL RECORD: ICD-10-PCS | Mod: CPTII,,, | Performed by: PHYSICIAN ASSISTANT

## 2022-03-21 PROCEDURE — 1160F PR REVIEW ALL MEDS BY PRESCRIBER/CLIN PHARMACIST DOCUMENTED: ICD-10-PCS | Mod: CPTII,,, | Performed by: PHYSICIAN ASSISTANT

## 2022-03-21 PROCEDURE — 3066F NEPHROPATHY DOC TX: CPT | Mod: CPTII,,, | Performed by: PHYSICIAN ASSISTANT

## 2022-03-21 PROCEDURE — 99214 OFFICE O/P EST MOD 30 MIN: CPT | Mod: S$PBB,,, | Performed by: PHYSICIAN ASSISTANT

## 2022-03-21 PROCEDURE — 3044F PR MOST RECENT HEMOGLOBIN A1C LEVEL <7.0%: ICD-10-PCS | Mod: CPTII,,, | Performed by: PHYSICIAN ASSISTANT

## 2022-03-21 PROCEDURE — 3074F SYST BP LT 130 MM HG: CPT | Mod: CPTII,,, | Performed by: PHYSICIAN ASSISTANT

## 2022-03-21 PROCEDURE — 3072F PR LOW RISK FOR RETINOPATHY: ICD-10-PCS | Mod: CPTII,,, | Performed by: PHYSICIAN ASSISTANT

## 2022-03-21 PROCEDURE — 3008F BODY MASS INDEX DOCD: CPT | Mod: CPTII,,, | Performed by: PHYSICIAN ASSISTANT

## 2022-03-21 PROCEDURE — 3066F PR DOCUMENTATION OF TREATMENT FOR NEPHROPATHY: ICD-10-PCS | Mod: CPTII,,, | Performed by: PHYSICIAN ASSISTANT

## 2022-03-21 PROCEDURE — 99214 PR OFFICE/OUTPT VISIT, EST, LEVL IV, 30-39 MIN: ICD-10-PCS | Mod: S$PBB,,, | Performed by: PHYSICIAN ASSISTANT

## 2022-03-21 PROCEDURE — 99215 OFFICE O/P EST HI 40 MIN: CPT | Mod: PBBFAC | Performed by: PHYSICIAN ASSISTANT

## 2022-03-21 PROCEDURE — 3074F PR MOST RECENT SYSTOLIC BLOOD PRESSURE < 130 MM HG: ICD-10-PCS | Mod: CPTII,,, | Performed by: PHYSICIAN ASSISTANT

## 2022-03-21 PROCEDURE — 3008F PR BODY MASS INDEX (BMI) DOCUMENTED: ICD-10-PCS | Mod: CPTII,,, | Performed by: PHYSICIAN ASSISTANT

## 2022-03-21 PROCEDURE — 73030 XR SHOULDER COMPLETE 2 OR MORE VIEWS RIGHT: ICD-10-PCS | Mod: 26,RT,, | Performed by: RADIOLOGY

## 2022-03-21 PROCEDURE — 3061F PR NEG MICROALBUMINURIA RESULT DOCUMENTED/REVIEW: ICD-10-PCS | Mod: CPTII,,, | Performed by: PHYSICIAN ASSISTANT

## 2022-03-21 PROCEDURE — 1159F MED LIST DOCD IN RCRD: CPT | Mod: CPTII,,, | Performed by: PHYSICIAN ASSISTANT

## 2022-03-21 PROCEDURE — 99999 PR PBB SHADOW E&M-EST. PATIENT-LVL V: CPT | Mod: PBBFAC,,, | Performed by: PHYSICIAN ASSISTANT

## 2022-03-21 PROCEDURE — 3072F LOW RISK FOR RETINOPATHY: CPT | Mod: CPTII,,, | Performed by: PHYSICIAN ASSISTANT

## 2022-03-21 PROCEDURE — 73030 X-RAY EXAM OF SHOULDER: CPT | Mod: TC,RT

## 2022-03-21 PROCEDURE — 3079F PR MOST RECENT DIASTOLIC BLOOD PRESSURE 80-89 MM HG: ICD-10-PCS | Mod: CPTII,,, | Performed by: PHYSICIAN ASSISTANT

## 2022-03-21 PROCEDURE — 99999 PR PBB SHADOW E&M-EST. PATIENT-LVL V: ICD-10-PCS | Mod: PBBFAC,,, | Performed by: PHYSICIAN ASSISTANT

## 2022-03-21 PROCEDURE — 3044F HG A1C LEVEL LT 7.0%: CPT | Mod: CPTII,,, | Performed by: PHYSICIAN ASSISTANT

## 2022-03-21 PROCEDURE — 1160F RVW MEDS BY RX/DR IN RCRD: CPT | Mod: CPTII,,, | Performed by: PHYSICIAN ASSISTANT

## 2022-03-21 PROCEDURE — 73030 X-RAY EXAM OF SHOULDER: CPT | Mod: 26,RT,, | Performed by: RADIOLOGY

## 2022-03-21 NOTE — PROGRESS NOTES
Patient ID: Yolanda Betts is a 51 y.o. female.    Chief Complaint: Fibromyalgia and Shoulder Pain (Right shoulder pain)      HPI: Yolanda Betts  is a 51 y.o. female who c/o Fibromyalgia and Shoulder Pain (Right shoulder pain)  Established patient follow-up visit.  At her last office visit, I gave her a steroid injection into the right shoulder.  Unfortunately she continues to have significant pain in the shoulder.  Rated 8/10 in severity.  It is waking her up at night.  She has difficulty with external rotation as well as forward flexion.      She also has known fibromyalgia.  Shoulder pain is different than her normal fibro pain.  She has established within interventional pain management as well.  She goes on to tell me that she complaining of bilateral hand pain as well as numbness and tingling.  When discussing with her further, she tells me she has had an EMG nerve conduction study showing carpal tunnel.  She has seen Dr. Roth who recommended surgical release.    She is having significant back pain when I last saw her.  Head few to CT abdomen which showed some kidney stones.  She has since see Urology.  They felt at least some of her back pain was coming from the kidney stones.    Serologies/Labs:  · Neg RF, CCP, ISAAC  Current Treatment:  · Savella 25mg bid  · Cymbalta 30 mg qhs      Past Medical History:   Diagnosis Date    Abnormal Pap smear of cervix     HPV genital warts    Anemia     Anxiety     Arthritis     Asthma 10/11/2016    Bipolar 1 disorder     Diabetes mellitus, type 2     Dyslipidemia associated with type 2 diabetes mellitus 5/13/2019    General anesthetics causing adverse effect in therapeutic use     Genital warts     GERD (gastroesophageal reflux disease)     Herpes simplex virus (HSV) infection     Hyperlipidemia     Hypertension     Hypertension complicating diabetes 5/5/2019    Migraine with aura and without status migrainosus, not intractable 3/21/2016     "Mild persistent asthma without complication 10/11/2016    Morbid obesity with body mass index (BMI) of 45.0 to 49.9 in adult 8/3/2017    Obstructive sleep apnea     ALEN (obstructive sleep apnea)     2L per N/C q HS    Schizoaffective disorder, bipolar type 2019    Seasonal allergic rhinitis due to pollen 2019    Type 2 diabetes mellitus with hyperglycemia, with long-term current use of insulin 2017    Type 2 diabetes mellitus with hyperglycemia, without long-term current use of insulin 2017     Past Surgical History:   Procedure Laterality Date    abcess removed right back      BELT ABDOMINOPLASTY      BREAST SURGERY Bilateral     Reduction     SECTION      X 2    COLONOSCOPY      COLONOSCOPY N/A 2018    Procedure: colonoscopy for iron deficiency anemia;  Surgeon: Frankie Sanchez MD;  Location: [facility] ENDO;  Service: Endoscopy;  Laterality: N/A;    COLONOSCOPY N/A 2018    Procedure: COLONOSCOPY;  Surgeon: Frankie Sanchez MD;  Location: [facility] ENDO;  Service: Endoscopy;  Laterality: N/A;    HYSTERECTOMY      w/ BSO; hypermenorrhea    MOUTH SURGERY      OOPHORECTOMY      hyst/bso, hypermenorrhea    ROBOT-ASSISTED CHOLECYSTECTOMY USING DA DARI XI N/A 1/3/2020    Procedure: XI ROBOTIC CHOLECYSTECTOMY;  Surgeon: Damir Driscoll MD;  Location: [facility] OR;  Service: General;  Laterality: N/A;    TOTAL REDUCTION MAMMOPLASTY      TUBAL LIGATION       Family History   Problem Relation Age of Onset    Diabetes Mother     Hyperlipidemia Mother     Hypertension Mother     Asthma Mother     COPD Mother     Glaucoma Mother     Thyroid disease Mother     Anesthesia problems Mother         "almost had a cardiac arrest" , blood clots    Hypertension Father     Hyperlipidemia Father     Cancer Father         Brain, lung, liver, kidney    Heart disease Maternal Grandmother     Hyperlipidemia Maternal Grandmother     Hypertension Maternal " "Grandmother     Cataracts Maternal Grandmother     Diabetes Maternal Grandmother     Heart disease Maternal Grandfather     Hyperlipidemia Maternal Grandfather     Hypertension Maternal Grandfather     Glaucoma Maternal Grandfather     Cancer Maternal Grandfather     Cataracts Maternal Grandfather     Macular degeneration Maternal Grandfather     Diabetes Maternal Grandfather     Heart disease Paternal Grandmother     Hyperlipidemia Paternal Grandmother     Hypertension Paternal Grandmother     Cataracts Paternal Grandmother     Heart disease Paternal Grandfather     Hyperlipidemia Paternal Grandfather     Hypertension Paternal Grandfather     Cataracts Paternal Grandfather     Breast cancer Maternal Cousin     Breast cancer Maternal Cousin     Breast cancer Maternal Cousin     Breast cancer Maternal Cousin      Social History     Socioeconomic History    Marital status: Single   Tobacco Use    Smoking status: Never Smoker    Smokeless tobacco: Never Used   Substance and Sexual Activity    Alcohol use: Yes     Alcohol/week: 0.0 standard drinks     Comment: socially  No alcohol 72h prior to sx    Drug use: No    Sexual activity: Yes     Partners: Male     Birth control/protection: Surgical     Comment: hy   Social History Narrative    Long-term care nurse     Medication List with Changes/Refills   Current Medications    AMLODIPINE (NORVASC) 10 MG TABLET    Take 1 tablet (10 mg total) by mouth once daily.    AMLODIPINE (NORVASC) 5 MG TABLET    Take 1 tablet (5 mg total) by mouth every evening.    ASPIRIN (ECOTRIN) 81 MG EC TABLET    Take 81 mg by mouth once daily.    BD INSULIN SYRINGE ULTRA-FINE 1/2 ML 30 GAUGE X 1/2" SYRG        BLOOD SUGAR DIAGNOSTIC (ONETOUCH ULTRA TEST) STRP    Check blood glucose 4 times daily as directed and as needed (dispense insurance preferred brand or patient choice)    BLOOD SUGAR DIAGNOSTIC STRP    1 each by Misc.(Non-Drug; Combo Route) route 4 (four) times " daily. [facility] Glucose Test Strips    BLOOD-GLUCOSE METER MISC    Use as directed    BUDESONIDE-FORMOTEROL 160-4.5 MCG (SYMBICORT) 160-4.5 MCG/ACTUATION HFAA    Inhale 2 puffs into the lungs every 12 (twelve) hours. Controller : Use spacer    CLONAZEPAM (KLONOPIN) 1 MG TABLET    Take 1 tablet (1 mg total) by mouth once daily.    CYCLOBENZAPRINE (FLEXERIL) 10 MG TABLET    Take 0.5-1 tablets (5-10 mg total) by mouth 2 (two) times daily as needed for Muscle spasms. May cause drowsiness.    CYCLOSPORINE 0.05 % DROP    Place 1 drop into both eyes 2 (two) times daily.    DICLOFENAC SODIUM (VOLTAREN) 1 % GEL    Apply 2 grams topically 4 (four) times daily as needed.    DOCUSATE SODIUM (COLACE) 100 MG CAPSULE    Take 1 capsule (100 mg total) by mouth 2 (two) times daily.    DULOXETINE (CYMBALTA) 30 MG CAPSULE    Take 1 capsule (30 mg total) by mouth every evening.    FENOFIBRATE (TRICOR) 54 MG TABLET    Take 1 tablet (54 mg total) by mouth once daily.    FISH OIL-OMEGA-3 FATTY ACIDS 300-1,000 MG CAPSULE    Take 1 capsule by mouth once daily.    FLASH GLUCOSE SCANNING READER (FREESTYLE AMY 2 READER) [facility]    Use as directed to check blood sugar    FROVATRIPTAN (FROVA) 2.5 MG TABLET    Take 1 tablet at onset of acute migraine. May take 1 more tablet 2 hours later if needed. (MAX 2 TABLET PER DAY. MAX 4 TABLETS PER WEEK.)    FUROSEMIDE (LASIX) 20 MG TABLET    Take 1 tablet (20 mg total) by mouth once daily.    INSULIN ASPART U-100 (NOVOLOG FLEXPEN U-100 INSULIN) 100 UNIT/ML (3 ML) INPN PEN    Inject 10 Units into the skin 3 (three) times daily before meals. Use based on sliding scale    LANCETS (ONETOUCH DELICA PLUS LANCET) 30 GAUGE MISC    Use as directed 3 times daily    LIDOCAINE-PRILOCAINE (EMLA) CREAM    Apply topically up to 4 times per day to affected area for pain relief    LURASIDONE (LATUDA) 60 MG TAB TABLET    Take 1 tab by mouth daily with 350 calories    MAGNESIUM OXIDE (MAG-OX) 400 MG (241.3 MG MAGNESIUM) TABLET  "   Take 1 tablet (400 mg total) by mouth once daily.    METFORMIN (GLUCOPHAGE) 1000 MG TABLET    Take 1 tablet (1,000 mg total) by mouth 2 (two) times daily with meals.    METHYLPREDNISOLONE (MEDROL DOSEPACK) 4 MG TABLET    Take as directed    MILNACIPRAN (SAVELLA) 25 MG TAB TABLET    Take 1 tablet (25 mg total) by mouth 2 (two) times daily.    MULTIVIT,CALC,MINS/IRON/FOLIC (DAILY MULTIPLE FOR WOMEN ORAL)    Take 1 tablet by mouth once daily.    PEN NEEDLE, DIABETIC (BD ULTRA-FINE MINI PEN NEEDLE) 31 GAUGE X 3/16" NDLE    Use 5 a day    PEN NEEDLE, DIABETIC (BD ULTRA-FINE MINI PEN NEEDLE) 31 GAUGE X 3/16" NDLE    Use one needle 5 times a day    SPIRONOLACTONE (ALDACTONE) 25 MG TABLET    Take 1 tablet (25 mg total) by mouth once daily.    VALSARTAN (DIOVAN) 320 MG TABLET    Take 1 tablet (320 mg total) by mouth once daily.     Review of patient's allergies indicates:   Allergen Reactions    Codeine Itching    Hydromorphone Other (See Comments)     Can't wake up for long time if taken  Slow to wake up after surgery after receiving    Lyrica [pregabalin] Itching    Neuromuscular blockers, steroidal Hives     some    Latex, natural rubber Rash    Morphine Rash     itching    Norco [hydrocodone-acetaminophen] Itching, Rash and Hallucinations    Seconal [secobarbital sodium] Rash     itching    Tylox [oxycodone-acetaminophen] Rash       Objective:        General    Nursing note and vitals reviewed.  Constitutional: She is oriented to person, place, and time. She appears well-developed and well-nourished.   HENT:   Head: Normocephalic and atraumatic.   Eyes: EOM are normal.   Pulmonary/Chest: Effort normal.   Neurological: She is alert and oriented to person, place, and time.   Psychiatric: She has a normal mood and affect. Her behavior is normal.         Back (L-Spine & T-Spine) / Neck (C-Spine) Exam     Tenderness   The patient is tender to palpation of the right trapezial.       Right Hand/Wrist Exam     Tests "   Tinel's sign (median nerve): positive        Left Hand/Wrist Exam     Tests   Tinel's sign (median nerve): positive        Right Elbow Exam     Comments:  Slight decrease  strength      Left Elbow Exam     Comments:  Slight decrease  strength    Right Shoulder Exam     Inspection/Observation   Swelling: absent  Bruising: absent  Scars: absent  Deformity: absent  Scapular Dyskinesia: negative    Tenderness   The patient is tender to palpation of the greater tuberosity, acromioclavicular joint and biceps tendon.    Range of Motion   Active abduction: abnormal   Passive abduction: normal   Extension: abnormal   Forward Flexion: abnormal   Forward Elevation: abnormal  Adduction: normal  External Rotation 0 degrees: normal   Internal rotation 0 degrees:  Sacrum abnormal     Tests & Signs   Cross arm: negative  Drop arm: negative  Moseley test: positive  Impingement: positive  Active Compression Test ([place]'s Sign): positive  Speed's Test: positive    Other   Sensation: normal    Comments:  Multiple tender spots associated with FM    Left Shoulder Exam     Inspection/Observation   Swelling: absent  Bruising: absent  Scars: absent  Deformity: absent  Scapular Dyskinesia: negative    Range of Motion   Active abduction: normal   Passive abduction: normal   Extension: normal   Forward Flexion: normal   Forward Elevation: normal  Adduction: normal  External Rotation 0 degrees: normal   Internal rotation 0 degrees: normal     Other   Sensation: normal       Muscle Strength   Right Upper Extremity   Shoulder Abduction: 5/5   Shoulder Internal Rotation: 5/5   Shoulder External Rotation: 5/5   Left Upper Extremity  Shoulder Abduction: 5/5   Shoulder Internal Rotation: 5/5   Shoulder External Rotation: 5/5     Vascular Exam     Right Pulses      Radial:                    2+      Left Pulses      Radial:                    2+      Capillary Refill  Right Hand: normal capillary refill  Left Hand: normal capillary  refill      Shoulder Xrays from today were personally reviewed by me.  MRI images and report were reviewed today.  I agree with the radiologist's interpretation.      X-ray Shoulder 2 or More Views Right  Narrative: EXAMINATION:  XR SHOULDER COMPLETE 2 OR MORE VIEWS RIGHT    CLINICAL HISTORY:  Impingement syndrome of right shoulder    TECHNIQUE:  Two or three views of the right shoulder were performed.    COMPARISON:  Right shoulder radiographs May 19, 2020    FINDINGS:  No right shoulder fracture.  Alignment is normal.  Acromioclavicular and glenohumeral joint spaces are within normal limits.  Subtle calcifications noted along the lateral aspect of the humeral head suggestive of mild calcific tendinopathy.  No osseous erosion or suspicious osseous lesion.  Visualized lung is clear.  Impression: As above.    Electronically signed by: Ronaldo Aburto  Date:    03/21/2022  Time:    07:45        MRI Shoulder Without Contrast Right  Order: [id number]   Status: Final result     Visible to patient: Yes (seen)     Next appt: 02/11/2022 at 07:00 AM in Psychiatry (Liv Stout, [facility])     Dx: Chronic right shoulder pain     0 Result Notes     1 Patient Communication    Details    Reading Physician Reading Date Result Priority   Joel Gutierrez MD  [phone] 7/19/2020 Routine     Narrative & Impression  EXAMINATION:  MRI SHOULDER WITHOUT CONTRAST RIGHT     CLINICAL HISTORY:  Shoulder pain, bursitis suspected, nondiagnostic xray;  Pain in right shoulder     TECHNIQUE:  Multisequence, multiplanar MR imaging of the shoulder performed without contrast.     COMPARISON:  05/19/2020 radiograph     FINDINGS:  Rotator cuff:     Supraspinatus/infraspinatus: Tendinosis present with possible intrasubstance interstitial tearing at the posterior supraspinatus conjoint tendon junction.     Subscapularis: Intact     Teres minor: Intact     Muscle bulk is maintained.     Labrum: Glenoid labrum is intact     Biceps: Long head biceps  tendon is intact.     Bone: No fracture present.  Red marrow conversion findings present which can be associated with multiple causes including anemia, sickle cell, smoking and obesity.     Acromioclavicular joint:Mild to moderate degenerative findings.     Cartilage: Glenohumeral joint demonstrates age expected loss.     Miscellaneous: No significant joint effusion.  Trace subacromial/subdeltoid bursal fluid present.  Small amount of fluid also present within the subcoracoid bursa.     Impression:     1. Supraspinatus/infraspinatus tendinosis with possible short-segment intrasubstance interstitial tearing.  2. Suspected mild subacromial/subdeltoid bursitis.  Small amount of subcoracoid bursal fluid as well.  3. AC joint degenerative hypertrophy findings.  4. Other findings as above.        Electronically signed by: Joel Gutierrez MD  Date:                                            07/19/2020  Time:                                           11:41       Assessment:       Encounter Diagnoses   Name Primary?    Impingement syndrome of right shoulder Yes    Biceps tendinitis of right shoulder     Calcific tendinitis of right shoulder     Fibromyalgia           Plan:       Yolanda was seen today for fibromyalgia and shoulder pain.    Diagnoses and all orders for this visit:    Impingement syndrome of right shoulder  -     Ambulatory referral/consult to Orthopedics; Future    Biceps tendinitis of right shoulder  -     Ambulatory referral/consult to Orthopedics; Future    Calcific tendinitis of right shoulder  -     Ambulatory referral/consult to Orthopedics; Future    Fibromyalgia        Yolanda Betts comes in today for follow-up on the above.  She has failed a home exercise program for the right shoulder in addition to corticosteroid injection.  I would like to refer her to Dr. Eamon Hall for consideration of other treatment options for calcific tendinitis and bicipital tendinitis.  Physical therapy is  difficult for her due both to time and financial constraints.  I would also recommend she follow-up with Dr. Roth to discuss proceeding with carpal tunnel release given she is now complaining of hand pain, continuous numbness and tingling, as well as weakness of her  strength bilaterally.    She has physical exam findings consistent with fibromyalgia.  I recommend she continue current treatment regimen and follow-up with Interventional Pain Medicine as instructed.  She will follow up with GI and Urology as instructed.  I will see her in 6 months.  Patient verbalizes understanding and agrees.    30 minutes of total time spent on the encounter, which includes face to face time and non-face to face time preparing to see the patient (eg, review of tests), Obtaining and/or reviewing separately obtained history, Documenting clinical information in the electronic or other health record, Independently interpreting results (not separately reported) and communicating results to the patient/family/caregiver, or Care coordination (not separately reported).     Follow up in about 6 months (around 9/21/2022).    The patient understands, chooses and consents to this plan and accepts all   the risks which include but are not limited to the risks mentioned above.     Disclaimer: This note was prepared using a voice recognition system and is likely to have sound alike errors within the text.

## 2022-03-23 ENCOUNTER — OFFICE VISIT (OUTPATIENT)
Dept: PSYCHIATRY | Facility: CLINIC | Age: 52
End: 2022-03-23
Payer: MEDICAID

## 2022-03-23 DIAGNOSIS — K59.00 CONSTIPATION, UNSPECIFIED CONSTIPATION TYPE: ICD-10-CM

## 2022-03-23 DIAGNOSIS — F41.1 GENERALIZED ANXIETY DISORDER: ICD-10-CM

## 2022-03-23 DIAGNOSIS — F31.30 BIPOLAR I DISORDER, MOST RECENT EPISODE (OR CURRENT) DEPRESSED: Primary | ICD-10-CM

## 2022-03-23 DIAGNOSIS — F43.21 GRIEF: ICD-10-CM

## 2022-03-23 PROCEDURE — 99214 OFFICE O/P EST MOD 30 MIN: CPT | Mod: HP,HB,95, | Performed by: PSYCHOLOGIST

## 2022-03-23 PROCEDURE — 3044F PR MOST RECENT HEMOGLOBIN A1C LEVEL <7.0%: ICD-10-PCS | Mod: HP,HB,CPTII,95 | Performed by: PSYCHOLOGIST

## 2022-03-23 PROCEDURE — 3072F PR LOW RISK FOR RETINOPATHY: ICD-10-PCS | Mod: HP,HB,CPTII,95 | Performed by: PSYCHOLOGIST

## 2022-03-23 PROCEDURE — 99214 PR OFFICE/OUTPT VISIT, EST, LEVL IV, 30-39 MIN: ICD-10-PCS | Mod: HP,HB,95, | Performed by: PSYCHOLOGIST

## 2022-03-23 PROCEDURE — 3061F PR NEG MICROALBUMINURIA RESULT DOCUMENTED/REVIEW: ICD-10-PCS | Mod: HP,HB,CPTII,95 | Performed by: PSYCHOLOGIST

## 2022-03-23 PROCEDURE — 3072F LOW RISK FOR RETINOPATHY: CPT | Mod: HP,HB,CPTII,95 | Performed by: PSYCHOLOGIST

## 2022-03-23 PROCEDURE — 1159F MED LIST DOCD IN RCRD: CPT | Mod: HP,HB,CPTII,95 | Performed by: PSYCHOLOGIST

## 2022-03-23 PROCEDURE — 3066F PR DOCUMENTATION OF TREATMENT FOR NEPHROPATHY: ICD-10-PCS | Mod: HP,HB,CPTII,95 | Performed by: PSYCHOLOGIST

## 2022-03-23 PROCEDURE — 1159F PR MEDICATION LIST DOCUMENTED IN MEDICAL RECORD: ICD-10-PCS | Mod: HP,HB,CPTII,95 | Performed by: PSYCHOLOGIST

## 2022-03-23 PROCEDURE — 3061F NEG MICROALBUMINURIA REV: CPT | Mod: HP,HB,CPTII,95 | Performed by: PSYCHOLOGIST

## 2022-03-23 PROCEDURE — 3044F HG A1C LEVEL LT 7.0%: CPT | Mod: HP,HB,CPTII,95 | Performed by: PSYCHOLOGIST

## 2022-03-23 PROCEDURE — 3066F NEPHROPATHY DOC TX: CPT | Mod: HP,HB,CPTII,95 | Performed by: PSYCHOLOGIST

## 2022-03-23 RX ORDER — LURASIDONE HYDROCHLORIDE 60 MG/1
TABLET, FILM COATED ORAL
Qty: 30 TABLET | Refills: 2 | Status: SHIPPED | OUTPATIENT
Start: 2022-03-23 | End: 2022-05-02 | Stop reason: SDUPTHER

## 2022-03-23 RX ORDER — DULOXETIN HYDROCHLORIDE 30 MG/1
30 CAPSULE, DELAYED RELEASE ORAL NIGHTLY
Qty: 30 CAPSULE | Refills: 2 | Status: SHIPPED | OUTPATIENT
Start: 2022-03-23 | End: 2022-05-02 | Stop reason: SDUPTHER

## 2022-03-23 RX ORDER — DOCUSATE SODIUM 100 MG/1
100 CAPSULE, LIQUID FILLED ORAL 2 TIMES DAILY PRN
Qty: 60 CAPSULE | Refills: 2 | Status: SHIPPED | OUTPATIENT
Start: 2022-03-23 | End: 2022-06-12

## 2022-03-23 RX ORDER — CLONAZEPAM 1 MG/1
1 TABLET ORAL DAILY
Qty: 30 TABLET | Refills: 2 | Status: SHIPPED | OUTPATIENT
Start: 2022-03-23 | End: 2022-05-02 | Stop reason: SDUPTHER

## 2022-03-23 NOTE — PATIENT INSTRUCTIONS
"[facility]PARTMENT OF PSYCHIATRY   PATIENT INFORMATION    We appreciate the opportunity to participate in your medical care and hope the following protocols will make it easier for you to receive quality treatment in our department.    PUNCTUALITY: Your appointment is scheduled for a fixed amount of time, reserved especially for you.  To get the benefit of your appointment, please arrive at least 15 minutes early to allow time for traffic, parking and registration.  Should you arrive more than 15 minutes late to your appointment, you will be rescheduled in order to assure your clinician has adequate time to assess you and provide helpful care.      APPOINTMENTS: Appointments are made by the nursing/front office staff or through the patient portal. Providers do not have access  to schedule appointments. Walk in appointments are not available. FOR EMERGENCIES, PLEASE GO THE CLOSEST EMERGENCY ROOM.    CANCELLATION/MISSED APPOINTMENTS:   In order to receive quality care, all appointments must be kept.  If you are unable to keep an appointment, please reschedule at least 3 days prior if possible. Late cancellations (within 24 hours of the appointment) and repeated no-show appointments may result in dismissal from the clinic. After two no show/late cancellation visits, you will receive a notice letter, alerting you to keep visits to prevent department dismissal. If another visit is missed after receipt of the notice, you will be discharged from the clinic. This policy is in effect to allow for other individuals on a long waiting list to be seen as soon as possible. Unlike other branches of medicine where several individuals can be scheduled in a 30 minute time slot, only one individual can be scheduled in any time slot in Psychiatry.     MESSAGES: For simple questions/concerns, you may contact your individual providers electronically through the "[facility]" portal or by calling [phone] " with messages relayed via office staff. If relevant, include pharmacy name and phone number, date of last visit and next scheduled visit, phone number where you can be reached throughout the day, and whether leaving a voicemail or message on an answering machine is acceptable. Messages will be returned by the Medical Assistant or Office Staff after your provider has reviewed the message.  Please allow 24 hours for a returned message before leaving another message. Messages will be checked each workday (Monday through Friday) during office hours (8:00 a.m. and 5:00 p.m.) and returned at most within one business day.  You may leave a non-urgent message after hours. Note that psychotherapy and medication management are not appropriate by telephone or the patient portal.    PRESCRIPTION REFILLS:  Please communicate with your prescriber about any refills you need during your appointment. You may also request refills through the [facility] portal (preferred) or by calling the clinic. Prescriptions will be filled during office hours.     Please do not wait until you are completely out of medication to request refills. Same day refills are not always possible. Patients may experience symptoms of withdrawal if they run out of medications. The patient assumes all responsibility when there is an issue with non-compliance with follow-up appointments and medications.  Some medications are controlled and regulated by the FDA and [place]. Some of these medications can not be refilled before 30 days and require a face to face appointment.     PAPERWORK REQUESTS: If you have any forms or letters that need to be completed by your doctor, please present these at the beginning of the appointment to ensure that information needed to complete them is obtained during the office visit. Paperwork will be returned within 7-10 business days. Staff will call you to  the paperwork when completed.    SPECIAL EVALUATIONS: Please note that our  "department is treatment-focused. As such, we focus on treatment-oriented evaluations and do not perform specialty or "forensic" evaluations. Examples are listed below.    Disability: We do not do disability evaluations.  Please contact Social Security Administration for evaluations and determinations. You will then sign releases allowing for records from your treatment providers to be forwarded to Social Security Administration to use in their evaluation.  Gun Permit: We do not offer Sound Judgment Evaluations or assessments leading to gun ownership, nor do we fill out or file paperwork relevant to owning, concealing or purchasing a firearm.  Emotional Support     Animals (LUCIO): We do not provide documentation, including letters, to aid in the acclamation that an Emotional Support Animal is required. Note that ESAs are not trained to perform tasks or recognize particular signs or symptoms. Rather, they are distinguished by the close, emotional, and supportive bond between the animal and the owner.       SAMPLES: We do not provide samples of any medications. If you have financial difficulties and are on a limited income, you may qualify for Patient Assistance Programs from various pharmaceutical companies. This will require that you complete paperwork with your financial information, but this does not guarantee that the company will approve the application. Alternative medication options can be discussed.    REFERRALS/COORDINATION: You will be referred to other providers if we feel unable to adequately diagnose or treat your particular condition, or if collaboration with another provider would allow for better management of your condition.     Call In if problems  Call Report Side Effects   Encouraged to follow up with primary care / Gen Med MD for continued monitoring of general health and wellness  Call 911 Or go to ER if Acute Concerns (especially if any thoughts of harm to self or other)        Mariam Young, PhD, " CRISTINA  Advanced Practice Medical Psychologist  [facility]  [address]  [place] [id number] [phone] ph  [phone] fax

## 2022-03-23 NOTE — PROGRESS NOTES
Outpatient Psychiatry Follow-Up Visit    3/23/2022    Timeframe: Corona Virus Outbreak     The patient location is: Patient's car/ Patient reported that his/her location at the time of this visit was in the [facility]     Visit type: Virtual visit with synchronous audio and video--We had to use audio via speakerphone and continue the video through the Mychart due to technical difficulties and audio quality--we lost the video portion when we connected via speakerphone.    Each patient to whom he or she provides medical services by telemedicine is: (1) informed of the relationship between the physician and patient and the respective role of any other health care provider with respect to management of the patient; and (2) notified that he or she may decline to receive medical services by telemedicine and may withdraw from such care at any time.    I also informed patient of the following:   Mariam Young, PhD, MPAP:  [place] medical license number: [id number]    My contact info:  [facility] / 2nd Floor  [address] [place] [address]   Ph: [phone]    If technology issues, call office phone: Ph: [phone]  If crisis: Dial 911 or go to nearest Emergency Room (ER)  If questions related to privacy practices: contact [facility] Information Department: [phone]    Chief Complaint:  Yolanda Betts is a 51 y.o. female who presents today for follow-up of mood and anxiety.       Impressions/Plan from last visit: Yolanda attended her virtual visit. We had audio problems and had to use the speakerphone for audio. 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 She said that she has been sleepy for a month; has been doing Keto for the last week. We reviewed her medicines and the multiple drug-drug interactions causing CNS depression, likely contributing to her sedation throughout the day. She has been on  "Gaurav reportedly for about 6 months--that may have some antidepressant benefit; we agreed to decrease her other medicines and monitor. She has been in therapy. Plan--decrease Cymbalta to 30 mg and Latuda to 60 mg; decrease Klonopin 1 mg daily.      since November 2021.    Interval History and Content of Current Session: Yolanda attended her virtual visit. She is buying a house--her closing date is 3/31. She has everything lined up; will be getting a moving truck today. The "man I'm living with" is helping her move. She plans to end the relationship after she moves. Work is still going okay--"it's stressful, but I'm hanging in there." She has still be sleeping excessively--has her sleep study on 3/29 and 3/30. In the mornings once she sits at her computer desk, she is sleepy. Around 2 or 3, she is "okay." She did not get as much sleep last night--blood sugar has been low. When she takes Klonopin, it makes her "relaxed"--it does not make her sleepy. We had decreased her medicines, but she has had an increase in her pain. She has been stressed with getting all of the paperwork for closing; and juggling work. She wants to continue medicines as currently prescribed--continue Cymbalta 30 mg; Latuda to 60 mg; Klonopin 1 mg daily.     since November 2021.        GAD7 3/23/2022 2/18/2022 2/13/2022   1. Feeling nervous, anxious, or on edge? 1 1 2   2. Not being able to stop or control worrying? 1 1 2   3. Worrying too much about different things? 2 1 2   4. Trouble relaxing? 2 1 3   5. Being so restless that it is hard to sit still? 1 0 0   6. Becoming easily annoyed or irritable? 1 1 1   7. Feeling afraid as if something awful might happen? 1 1 2   JOANN-7 Score 9 6 12      0-4 = Minimal anxiety  5-9 = Mild anxiety  10-14 = Moderate anxiety  15-21 = Severe anxiety       No flowsheet data found.      0-4 = No intervention  5 to 9 = Mild  10 to 14 = Moderate  15 to 19 = Moderately severe  =20 = Severe    Review of Systems " "  · PSYCHIATRIC: Pertinant items are noted in the narrative.    Past Medical, Family and Social History: The patient's past medical, family and social history have been reviewed and updated as appropriate within the electronic medical record - see encounter notes.      Current Outpatient Medications:     amLODIPine (NORVASC) 10 MG tablet, Take 1 tablet (10 mg total) by mouth once daily., Disp: 30 tablet, Rfl: 11    amLODIPine (NORVASC) 5 MG tablet, Take 1 tablet (5 mg total) by mouth every evening., Disp: 30 tablet, Rfl: 11    aspirin (ECOTRIN) 81 MG EC tablet, Take 81 mg by mouth once daily., Disp: , Rfl:     BD INSULIN SYRINGE ULTRA-FINE 1/2 mL 30 gauge x 1/2" Syrg, , Disp: , Rfl: 0    blood sugar diagnostic (ONETOUCH ULTRA TEST) Strp, Check blood glucose 4 times daily as directed and as needed (dispense insurance preferred brand or patient choice), Disp: 200 each, Rfl: 5    blood sugar diagnostic Strp, 1 each by Misc.(Non-Drug; Combo Route) route 4 (four) times daily. [facility] Glucose Test Strips, Disp: 400 strip, Rfl: 3    blood-glucose meter Misc, Use as directed (Patient taking differently: Use as directed), Disp: 1 each, Rfl: 0    budesonide-formoterol 160-4.5 mcg (SYMBICORT) 160-4.5 mcg/actuation HFAA, Inhale 2 puffs into the lungs every 12 (twelve) hours. Controller : Use spacer, Disp: 10.2 g, Rfl: 11    clonazePAM (KLONOPIN) 1 MG tablet, Take 1 tablet (1 mg total) by mouth once daily., Disp: 30 tablet, Rfl: 2    cyclobenzaprine (FLEXERIL) 10 MG tablet, Take 0.5-1 tablets (5-10 mg total) by mouth 2 (two) times daily as needed for Muscle spasms. May cause drowsiness., Disp: 90 tablet, Rfl: 0    cycloSPORINE 0.05 % Drop, Place 1 drop into both eyes 2 (two) times daily., Disp: 5.5 mL, Rfl: 11    diclofenac sodium (VOLTAREN) 1 % Gel, Apply 2 grams topically 4 (four) times daily as needed., Disp: 5 each, Rfl: 0    docusate sodium (COLACE) 100 MG capsule, Take 1 capsule (100 mg total) by mouth 2 (two) " times daily., Disp: 180 capsule, Rfl: 0    DULoxetine (CYMBALTA) 30 MG capsule, Take 1 capsule (30 mg total) by mouth every evening., Disp: 30 capsule, Rfl: 2    fenofibrate (TRICOR) 54 MG tablet, Take 1 tablet (54 mg total) by mouth once daily., Disp: 30 tablet, Rfl: 3    fish oil-omega-3 fatty acids 300-1,000 mg capsule, Take 1 capsule by mouth once daily., Disp: , Rfl:     flash glucose scanning reader (FREESTYLE AMY 2 READER) [facility], Use as directed to check blood sugar, Disp: 1 each, Rfl: 1    frovatriptan (FROVA) 2.5 MG tablet, Take 1 tablet at onset of acute migraine. May take 1 more tablet 2 hours later if needed. (MAX 2 TABLET PER DAY. MAX 4 TABLETS PER WEEK.), Disp: 9 tablet, Rfl: 1    furosemide (LASIX) 20 MG tablet, Take 1 tablet (20 mg total) by mouth once daily., Disp: 30 tablet, Rfl: 11    insulin aspart U-100 (NOVOLOG FLEXPEN U-100 INSULIN) 100 unit/mL (3 mL) InPn pen, Inject 10 Units into the skin 3 (three) times daily before meals. Use based on sliding scale, Disp: 15 mL, Rfl: 3    lancets (ONETOUCH DELICA PLUS LANCET) 30 gauge Misc, Use as directed 3 times daily (Patient taking differently: Use as directed 3 times daily), Disp: 100 each, Rfl: 11    LIDOcaine-prilocaine (EMLA) cream, Apply topically up to 4 times per day to affected area for pain relief (Patient taking differently: Apply topically up to 4 times per day to affected area for pain relief), Disp: 60 g, Rfl: 0    lurasidone (LATUDA) 60 mg Tab tablet, Take 1 tab by mouth daily with 350 calories, Disp: 30 tablet, Rfl: 2    magnesium oxide (MAG-OX) 400 mg (241.3 mg magnesium) tablet, Take 1 tablet (400 mg total) by mouth once daily., Disp: 90 tablet, Rfl: 3    metFORMIN (GLUCOPHAGE) 1000 MG tablet, Take 1 tablet (1,000 mg total) by mouth 2 (two) times daily with meals., Disp: 180 tablet, Rfl: 0    methylPREDNISolone (MEDROL DOSEPACK) 4 mg tablet, Take as directed (Patient taking differently: Take as directed), Disp: 21 each,  "Rfl: 0    milnacipran (SAVELLA) 25 mg Tab tablet, Take 1 tablet (25 mg total) by mouth 2 (two) times daily., Disp: 60 tablet, Rfl: 5    MULTIVIT,CALC,MINS/IRON/FOLIC (DAILY MULTIPLE FOR WOMEN ORAL), Take 1 tablet by mouth once daily., Disp: , Rfl:     pen needle, diabetic (BD ULTRA-FINE MINI PEN NEEDLE) 31 gauge x 3/16" Ndle, Use 5 a day, Disp: 200 each, Rfl: 3    pen needle, diabetic (BD ULTRA-FINE MINI PEN NEEDLE) 31 gauge x 3/16" Ndle, Use one needle 5 times a day (Patient taking differently: Use one needle 5 times a day), Disp: 200 each, Rfl: 3    spironolactone (ALDACTONE) 25 MG tablet, Take 1 tablet (25 mg total) by mouth once daily., Disp: 30 tablet, Rfl: 6    valsartan (DIOVAN) 320 MG tablet, Take 1 tablet (320 mg total) by mouth once daily., Disp: 90 tablet, Rfl: 3    Compliance: yes    Side effects: None    Risk Parameters:  Patient reports no suicidal ideation  Patient reports no homicidal ideation  Patient reports no self-injurious behavior  Patient reports no violent behavior    Exam (detailed: at least 9 elements; comprehensive: all 15 elements)   Constitutional  Vitals:  Most recent vital signs were reviewed.   Last 3 sets of Vitals    Vitals - 1 value per visit 3/16/2022 3/21/2022 3/21/2022   SYSTOLIC 118 - 122   DIASTOLIC 64 - 85   Pulse - - 94   Temp - - -   Resp - - -   SPO2 - - -   Weight (lb) - - 181.44   Weight (kg) - - 82.3   Height - - 56   BMI (Calculated) - - 40.7   VISIT REPORT - - -   Pain Score  - 8 -   Some recent data might be hidden          General:  age appropriate, casually dressed, neatly groomed, wearing glasses     Musculoskeletal  Muscle Strength/Tone:  no tremor, no tic   Gait & Station:  video visit     Psychiatric  Speech:  no latency; no press   Behavior: wnl   Mood & Affect:  stressed with buying a house  congruent and appropriate   Thought Process:  normal and logical   Associations:  intact   Thought Content:  normal, no suicidality, no homicidality, delusions, or " "paranoia   Insight:  has awareness of illness   Judgement: behavior is adequate to circumstances   Orientation:  grossly intact   Memory: intact for content of interview   Language: grossly intact   Attention Span & Concentration:  Grossly intact   Fund of Knowledge:  intact and appropriate to age and level of education     Assessment and Diagnosis   Status/Progress: Based on the examination today, the patient's problem(s) is/are improved and adequately but not ideally controlled.  New problems (multiple stressors) have been presented today.   Co-morbidities are complicating management of the primary condition.  The working differential for this patient includes possibly sleep disturbance--upcoming study.     General Impression:     Encounter Diagnoses   Name Primary?    Bipolar I disorder, most recent episode (or current) depressed Yes    Generalized anxiety disorder     Grief          Intervention/Counseling/Treatment Plan   · Medication Management: Discussed risks, benefits, and alternatives to treatment plan documented above with patient. I answered all patient questions related to this plan, and patient expressed understanding and agreement.   continue Cymbalta 30 mg; Latuda to 60 mg; Klonopin 1 mg daily  · continue therapy    Medication List with Changes/Refills   Current Medications    AMLODIPINE (NORVASC) 10 MG TABLET    Take 1 tablet (10 mg total) by mouth once daily.    AMLODIPINE (NORVASC) 5 MG TABLET    Take 1 tablet (5 mg total) by mouth every evening.    ASPIRIN (ECOTRIN) 81 MG EC TABLET    Take 81 mg by mouth once daily.    BD INSULIN SYRINGE ULTRA-FINE 1/2 ML 30 GAUGE X 1/2" SYRG        BLOOD SUGAR DIAGNOSTIC (ONETOUCH ULTRA TEST) STRP    Check blood glucose 4 times daily as directed and as needed (dispense insurance preferred brand or patient choice)    BLOOD SUGAR DIAGNOSTIC STRP    1 each by Misc.(Non-Drug; Combo Route) route 4 (four) times daily. [facility] Glucose Test Strips    BLOOD-GLUCOSE " METER MISC    Use as directed    BUDESONIDE-FORMOTEROL 160-4.5 MCG (SYMBICORT) 160-4.5 MCG/ACTUATION HFAA    Inhale 2 puffs into the lungs every 12 (twelve) hours. Controller : Use spacer    CYCLOBENZAPRINE (FLEXERIL) 10 MG TABLET    Take 0.5-1 tablets (5-10 mg total) by mouth 2 (two) times daily as needed for Muscle spasms. May cause drowsiness.    CYCLOSPORINE 0.05 % DROP    Place 1 drop into both eyes 2 (two) times daily.    DICLOFENAC SODIUM (VOLTAREN) 1 % GEL    Apply 2 grams topically 4 (four) times daily as needed.    DOCUSATE SODIUM (COLACE) 100 MG CAPSULE    Take 1 capsule (100 mg total) by mouth 2 (two) times daily.    FENOFIBRATE (TRICOR) 54 MG TABLET    Take 1 tablet (54 mg total) by mouth once daily.    FISH OIL-OMEGA-3 FATTY ACIDS 300-1,000 MG CAPSULE    Take 1 capsule by mouth once daily.    FLASH GLUCOSE SCANNING READER (DOCUSYS AMY 2 READER) MISC    Use as directed to check blood sugar    FROVATRIPTAN (FROVA) 2.5 MG TABLET    Take 1 tablet at onset of acute migraine. May take 1 more tablet 2 hours later if needed. (MAX 2 TABLET PER DAY. MAX 4 TABLETS PER WEEK.)    FUROSEMIDE (LASIX) 20 MG TABLET    Take 1 tablet (20 mg total) by mouth once daily.    INSULIN ASPART U-100 (NOVOLOG FLEXPEN U-100 INSULIN) 100 UNIT/ML (3 ML) INPN PEN    Inject 10 Units into the skin 3 (three) times daily before meals. Use based on sliding scale    LANCETS (ONETOUCH DELICA PLUS LANCET) 30 GAUGE MISC    Use as directed 3 times daily    LIDOCAINE-PRILOCAINE (EMLA) CREAM    Apply topically up to 4 times per day to affected area for pain relief    MAGNESIUM OXIDE (MAG-OX) 400 MG (241.3 MG MAGNESIUM) TABLET    Take 1 tablet (400 mg total) by mouth once daily.    METFORMIN (GLUCOPHAGE) 1000 MG TABLET    Take 1 tablet (1,000 mg total) by mouth 2 (two) times daily with meals.    METHYLPREDNISOLONE (MEDROL DOSEPACK) 4 MG TABLET    Take as directed    MILNACIPRAN (SAVELLA) 25 MG TAB TABLET    Take 1 tablet (25 mg total) by  "mouth 2 (two) times daily.    MULTIVIT,CALC,MINS/IRON/FOLIC (DAILY MULTIPLE FOR WOMEN ORAL)    Take 1 tablet by mouth once daily.    PEN NEEDLE, DIABETIC (BD ULTRA-FINE MINI PEN NEEDLE) 31 GAUGE X 3/16" NDLE    Use 5 a day    PEN NEEDLE, DIABETIC (BD ULTRA-FINE MINI PEN NEEDLE) 31 GAUGE X 3/16" NDLE    Use one needle 5 times a day    SPIRONOLACTONE (ALDACTONE) 25 MG TABLET    Take 1 tablet (25 mg total) by mouth once daily.    VALSARTAN (DIOVAN) 320 MG TABLET    Take 1 tablet (320 mg total) by mouth once daily.   Changed and/or Refilled Medications    Modified Medication Previous Medication    CLONAZEPAM (KLONOPIN) 1 MG TABLET clonazePAM (KLONOPIN) 1 MG tablet       Take 1 tablet (1 mg total) by mouth once daily.    Take 1 tablet (1 mg total) by mouth once daily.    DULOXETINE (CYMBALTA) 30 MG CAPSULE DULoxetine (CYMBALTA) 30 MG capsule       Take 1 capsule (30 mg total) by mouth every evening.    Take 1 capsule (30 mg total) by mouth every evening.    LURASIDONE (LATUDA) 60 MG TAB TABLET lurasidone (LATUDA) 60 mg Tab tablet       Take 1 tab by mouth daily with 350 calories    Take 1 tab by mouth daily with 350 calories        Return to Clinic: 6-8 weeks    Time spent with pt including note preparation: 23 minutes       Mariam Young, PhD, MP  Advanced Practice Medical Psychologist  [facility]  [address]  [place] [address]  [phone] ph  [phone] fax      "

## 2022-03-24 ENCOUNTER — TELEPHONE (OUTPATIENT)
Dept: PRIMARY CARE CLINIC | Facility: CLINIC | Age: 52
End: 2022-03-24
Payer: MEDICAID

## 2022-03-24 NOTE — TELEPHONE ENCOUNTER
Called patient in regards to message, offered earliest available on 4/1/22 with NP, but also informed patient of orthopedic appointment on 4/1/22. Patient states she will instead keep her orthopedic appointment for leg pain.        ----- Message from Shannon Pfeiffer sent at 3/24/2022 12:02 PM CDT -----  Contact: pt  Type:  Sooner Appointment Request    Caller is requesting a sooner appointment.  Caller declined first available appointment listed below.  Caller will not accept being placed on the waitlist and is requesting a message be sent to doctor.  Name of Caller: pt  When is the first available appointment? N/a  Symptoms: left leg pain  Would the patient rather a call back or a response via [facility]? Call back  Best Call Back Number:[phone]  Additional Information: n/a

## 2022-03-29 ENCOUNTER — HOSPITAL ENCOUNTER (OUTPATIENT)
Dept: SLEEP MEDICINE | Facility: HOSPITAL | Age: 52
Discharge: HOME | End: 2022-03-29
Attending: PHYSICIAN ASSISTANT
Payer: MEDICAID

## 2022-03-29 DIAGNOSIS — G47.61 PLMD (PERIODIC LIMB MOVEMENT DISORDER): ICD-10-CM

## 2022-03-29 DIAGNOSIS — G47.10 HYPERSOMNOLENCE DISORDER: ICD-10-CM

## 2022-03-29 PROCEDURE — 95805 MULTIPLE SLEEP LATENCY TEST: CPT

## 2022-03-29 PROCEDURE — 95810 POLYSOM 6/> YRS 4/> PARAM: CPT

## 2022-03-29 PROCEDURE — 95810 POLYSOM 6/> YRS 4/> PARAM: CPT | Mod: 26,,, | Performed by: INTERNAL MEDICINE

## 2022-03-29 PROCEDURE — 95810 PR POLYSOMNOGRAPHY, 4 OR MORE: ICD-10-PCS | Mod: 26,,, | Performed by: INTERNAL MEDICINE

## 2022-03-29 NOTE — Clinical Note
Comments added by Technician: . Comments added by Scorer: Sleep 4/5; REM 0/5. Night #1: SOL 01:45 mins Night #2 SOL 00:00 mins Night #3 SOL 05:17 mins Night #4 SOL 20:00 mins Night #5 SOL 03:23 mins Mean Sleep Latency: 06:05 minutes No sleep onset REMs present. This study does not suggest narcolepsy. Total number of naps attempted: 5 . Total number of naps with sleep attained:4 Pathologic sleepiness is suggested by short mean sleep latency. Pathologic Sleepiness (G47.10) Idiopathic hypersomnia (G47.11)

## 2022-03-30 PROCEDURE — 95805 PR MULTIPLE SLEEP LATENCY TEST: ICD-10-PCS | Mod: 26,,, | Performed by: INTERNAL MEDICINE

## 2022-03-30 PROCEDURE — 95805 MULTIPLE SLEEP LATENCY TEST: CPT | Mod: 26,,, | Performed by: INTERNAL MEDICINE

## 2022-04-05 ENCOUNTER — TELEPHONE (OUTPATIENT)
Dept: PULMONOLOGY | Facility: CLINIC | Age: 52
End: 2022-04-05
Payer: MEDICAID

## 2022-04-05 ENCOUNTER — OFFICE VISIT (OUTPATIENT)
Dept: PULMONOLOGY | Facility: CLINIC | Age: 52
End: 2022-04-05
Payer: MEDICAID

## 2022-04-05 VITALS — BODY MASS INDEX: 40.49 KG/M2 | WEIGHT: 180 LBS | HEIGHT: 56 IN

## 2022-04-05 DIAGNOSIS — G47.10 HYPERSOMNOLENCE DISORDER: Primary | ICD-10-CM

## 2022-04-05 DIAGNOSIS — G47.11 IDIOPATHIC HYPERSOMNOLENCE: ICD-10-CM

## 2022-04-05 PROCEDURE — 1159F MED LIST DOCD IN RCRD: CPT | Mod: CPTII,95,, | Performed by: INTERNAL MEDICINE

## 2022-04-05 PROCEDURE — 1160F PR REVIEW ALL MEDS BY PRESCRIBER/CLIN PHARMACIST DOCUMENTED: ICD-10-PCS | Mod: CPTII,95,, | Performed by: INTERNAL MEDICINE

## 2022-04-05 PROCEDURE — 99214 PR OFFICE/OUTPT VISIT, EST, LEVL IV, 30-39 MIN: ICD-10-PCS | Mod: 95,,, | Performed by: INTERNAL MEDICINE

## 2022-04-05 PROCEDURE — 3044F PR MOST RECENT HEMOGLOBIN A1C LEVEL <7.0%: ICD-10-PCS | Mod: CPTII,95,, | Performed by: INTERNAL MEDICINE

## 2022-04-05 PROCEDURE — 99214 OFFICE O/P EST MOD 30 MIN: CPT | Mod: 95,,, | Performed by: INTERNAL MEDICINE

## 2022-04-05 PROCEDURE — 3072F LOW RISK FOR RETINOPATHY: CPT | Mod: CPTII,95,, | Performed by: INTERNAL MEDICINE

## 2022-04-05 PROCEDURE — 3066F NEPHROPATHY DOC TX: CPT | Mod: CPTII,95,, | Performed by: INTERNAL MEDICINE

## 2022-04-05 PROCEDURE — 3061F NEG MICROALBUMINURIA REV: CPT | Mod: CPTII,95,, | Performed by: INTERNAL MEDICINE

## 2022-04-05 PROCEDURE — 3008F BODY MASS INDEX DOCD: CPT | Mod: CPTII,95,, | Performed by: INTERNAL MEDICINE

## 2022-04-05 PROCEDURE — 1160F RVW MEDS BY RX/DR IN RCRD: CPT | Mod: CPTII,95,, | Performed by: INTERNAL MEDICINE

## 2022-04-05 PROCEDURE — 3008F PR BODY MASS INDEX (BMI) DOCUMENTED: ICD-10-PCS | Mod: CPTII,95,, | Performed by: INTERNAL MEDICINE

## 2022-04-05 PROCEDURE — 3061F PR NEG MICROALBUMINURIA RESULT DOCUMENTED/REVIEW: ICD-10-PCS | Mod: CPTII,95,, | Performed by: INTERNAL MEDICINE

## 2022-04-05 PROCEDURE — 3066F PR DOCUMENTATION OF TREATMENT FOR NEPHROPATHY: ICD-10-PCS | Mod: CPTII,95,, | Performed by: INTERNAL MEDICINE

## 2022-04-05 PROCEDURE — 3072F PR LOW RISK FOR RETINOPATHY: ICD-10-PCS | Mod: CPTII,95,, | Performed by: INTERNAL MEDICINE

## 2022-04-05 PROCEDURE — 1159F PR MEDICATION LIST DOCUMENTED IN MEDICAL RECORD: ICD-10-PCS | Mod: CPTII,95,, | Performed by: INTERNAL MEDICINE

## 2022-04-05 PROCEDURE — 3044F HG A1C LEVEL LT 7.0%: CPT | Mod: CPTII,95,, | Performed by: INTERNAL MEDICINE

## 2022-04-05 RX ORDER — MODAFINIL 200 MG/1
200 TABLET ORAL DAILY
Qty: 30 TABLET | Refills: 4 | Status: SHIPPED | OUTPATIENT
Start: 2022-04-05 | End: 2022-05-05

## 2022-04-05 NOTE — TELEPHONE ENCOUNTER
Spoke with pt. Pt called to switch appt to virtual. Chart reviewed.   HT: 4'8  WT: 180lbs  EP: 22  ACT: 24

## 2022-04-05 NOTE — PROGRESS NOTES
The patient location is: HOME  The chief complaint leading to consultation is: Ciara abbott results    Visit type: audiovisual    Face to Face time with patient: *17 min  20 minutes of total time spent on the encounter, which includes face to face time and non-face to face time preparing to see the patient (eg, review of tests), Obtaining and/or reviewing separately obtained history, Documenting clinical information in the electronic or other health record, Independently interpreting results (not separately reported) and communicating results to the patient/family/caregiver, or Care coordination (not separately reported).         Each patient to whom he or she provides medical services by telemedicine is:  (1) informed of the relationship between the physician and patient and the respective role of any other health care provider with respect to management of the patient; and (2) notified that he or she may decline to receive medical services by telemedicine and may withdraw from such care at any time.    Notes:     Subjective:       Patient ID: Yolanda Betts is a 51 y.o. female.    Chief Complaint: Fatigue      50yo here for complaint of daytime fatigue  Has been going on for 6 months, getting worse  Feels foggy during the day, memory issues, concentration issues  In bed around 9pm, sometimes issues falling asleep, tired but cannot fall asleep, will watch tv is unable to sleep, wakes frequently, wakes up gasping for air, witnessed apneas, snores  concerns for narcolepsy, reports narcolepsy history, did not start medications  Complains of legs buckling with emotional changes, leg weakness, unable to stay awake sometimes, she says she has fallen off a treadmill recently due to this  Uses oxygen 2L at night  Taking cymbalta, latuda, klonapin at night for bipolar - recently decreased these medications a few weeks ago, tapering down - followed by Dr Mariam Young  Also with history of fibromyalgia  Recent weight  loss, gastric sleeve 6/4/2021  Dx of circadian rhythm disorder controlled with working on sleep hygiene per last pulm note  PSG in 2020 did not reveal sleep apnea, overnight oxygen testing revealed hypoxia    Also with history of asthma  Stable on Symbicort daily and albuterol prn  No signs of infection or exacerbation    04/05/2022  Last visit was 03/14/2022  Follow up to review PSG MSLT  MSL was 6 mins  No REM naps  Mild PLM  No ALEN  Bed time 0930 pm, Wake time 5 am  Meds that may add to sleepiness  Klonopin, Flexeril, Latuda, cymbalta  Spoke about skin reactions: reymundojuan caballero        EPWORTH SLEEPINESS SCALE 4/5/2022   Sitting and reading 3   Watching TV 3   Sitting, inactive in a public place (e.g. a theatre or a meeting) 3   As a passenger in a car for an hour without a break 3   Lying down to rest in the afternoon when circumstances permit 3   Sitting and talking to someone 1   Sitting quietly after a lunch without alcohol 3   In a car, while stopped for a few minutes in traffic 3   Total score 22          Immunization History   Administered Date(s) Administered    COVID-19, MRNA, LN-S, PF (MODERNA FULL 0.5 ML DOSE) 04/26/2021    Influenza 10/01/2018    Influenza - Quadrivalent - PF *Preferred* (6 months and older) 12/21/2017, 09/24/2020    Pneumococcal Polysaccharide - 23 Valent 01/31/2017    Tdap 12/21/2017      Tobacco Use: Low Risk     Smoking Tobacco Use: Never Smoker    Smokeless Tobacco Use: Never Used      Past Medical History:   Diagnosis Date    Abnormal Pap smear of cervix     HPV genital warts    Anemia     Anxiety     Arthritis     Asthma 10/11/2016    Bipolar 1 disorder     Diabetes mellitus, type 2     Dyslipidemia associated with type 2 diabetes mellitus 5/13/2019    General anesthetics causing adverse effect in therapeutic use     Genital warts     GERD (gastroesophageal reflux disease)     Herpes simplex virus (HSV) infection     Hyperlipidemia     Hypertension      "Hypertension complicating diabetes 5/5/2019    Migraine with aura and without status migrainosus, not intractable 3/21/2016    Mild persistent asthma without complication 10/11/2016    Morbid obesity with body mass index (BMI) of 45.0 to 49.9 in adult 8/3/2017    Obstructive sleep apnea     ALEN (obstructive sleep apnea)     2L per N/C q HS    Schizoaffective disorder, bipolar type 4/25/2019    Seasonal allergic rhinitis due to pollen 5/13/2019    Type 2 diabetes mellitus with hyperglycemia, with long-term current use of insulin 12/21/2017    Type 2 diabetes mellitus with hyperglycemia, without long-term current use of insulin 12/21/2017      Current Outpatient Medications on File Prior to Visit   Medication Sig Dispense Refill    amLODIPine (NORVASC) 10 MG tablet Take 1 tablet (10 mg total) by mouth once daily. 30 tablet 11    amLODIPine (NORVASC) 5 MG tablet Take 1 tablet (5 mg total) by mouth every evening. 30 tablet 11    aspirin (ECOTRIN) 81 MG EC tablet Take 81 mg by mouth once daily.      BD INSULIN SYRINGE ULTRA-FINE 1/2 mL 30 gauge x 1/2" Syrg   0    blood sugar diagnostic (ONETOUCH ULTRA TEST) Strp Check blood glucose 4 times daily as directed and as needed (dispense insurance preferred brand or patient choice) 200 each 5    blood sugar diagnostic Strp 1 each by Misc.(Non-Drug; Combo Route) route 4 (four) times daily. [facility] Glucose Test Strips 400 strip 3    blood-glucose meter Misc Use as directed (Patient taking differently: Use as directed) 1 each 0    budesonide-formoterol 160-4.5 mcg (SYMBICORT) 160-4.5 mcg/actuation HFAA Inhale 2 puffs into the lungs every 12 (twelve) hours. Controller : Use spacer 10.2 g 11    clonazePAM (KLONOPIN) 1 MG tablet Take 1 tablet (1 mg total) by mouth once daily. 30 tablet 2    cyclobenzaprine (FLEXERIL) 10 MG tablet Take 0.5-1 tablets (5-10 mg total) by mouth 2 (two) times daily as needed for Muscle spasms. May cause drowsiness. 90 tablet 0    " cycloSPORINE 0.05 % Drop Place 1 drop into both eyes 2 (two) times daily. 5.5 mL 11    diclofenac sodium (VOLTAREN) 1 % Gel Apply 2 grams topically 4 (four) times daily as needed. 5 each 0    docusate sodium (COLACE) 100 MG capsule Take 1 capsule (100 mg total) by mouth 2 (two) times daily as needed for Constipation. 60 capsule 2    DULoxetine (CYMBALTA) 30 MG capsule Take 1 capsule (30 mg total) by mouth every evening. 30 capsule 2    fenofibrate (TRICOR) 54 MG tablet Take 1 tablet (54 mg total) by mouth once daily. 30 tablet 3    fish oil-omega-3 fatty acids 300-1,000 mg capsule Take 1 capsule by mouth once daily.      flash glucose scanning reader (Zenops AMY 2 READER) Misc Use as directed to check blood sugar 1 each 1    frovatriptan (FROVA) 2.5 MG tablet Take 1 tablet at onset of acute migraine. May take 1 more tablet 2 hours later if needed. (MAX 2 TABLET PER DAY. MAX 4 TABLETS PER WEEK.) 9 tablet 1    furosemide (LASIX) 20 MG tablet Take 1 tablet (20 mg total) by mouth once daily. 30 tablet 11    insulin aspart U-100 (NOVOLOG FLEXPEN U-100 INSULIN) 100 unit/mL (3 mL) InPn pen Inject 10 Units into the skin 3 (three) times daily before meals. Use based on sliding scale 15 mL 3    insulin glargine U-300 conc (TOUJEO MAX U-300 SOLOSTAR) 300 unit/mL (3 mL) insulin pen Inject 90 Units into the skin every evening. 4 pen 3    lancets (ONETOUCH DELICA PLUS LANCET) 30 gauge Misc Use as directed 3 times daily (Patient taking differently: Use as directed 3 times daily) 100 each 11    LIDOcaine-prilocaine (EMLA) cream Apply topically up to 4 times per day to affected area for pain relief (Patient taking differently: Apply topically up to 4 times per day to affected area for pain relief) 60 g 0    lurasidone (LATUDA) 60 mg Tab tablet Take 1 tablet by mouth daily with 350 calories 30 tablet 2    magnesium oxide (MAG-OX) 400 mg (241.3 mg magnesium) tablet Take 1 tablet (400 mg total) by mouth once daily.  "90 tablet 3    metFORMIN (GLUCOPHAGE) 1000 MG tablet Take 1 tablet (1,000 mg total) by mouth 2 (two) times daily with meals. 180 tablet 0    methylPREDNISolone (MEDROL DOSEPACK) 4 mg tablet Take as directed (Patient taking differently: Take as directed) 21 each 0    milnacipran (SAVELLA) 25 mg Tab tablet Take 1 tablet (25 mg total) by mouth 2 (two) times daily. 60 tablet 5    MULTIVIT,CALC,MINS/IRON/FOLIC (DAILY MULTIPLE FOR WOMEN ORAL) Take 1 tablet by mouth once daily.      pen needle, diabetic (BD ULTRA-FINE MINI PEN NEEDLE) 31 gauge x 3/16" Ndle Use 5 a day 200 each 3    pen needle, diabetic (BD ULTRA-FINE MINI PEN NEEDLE) 31 gauge x 3/16" Ndle Use one needle 5 times a day (Patient taking differently: Use one needle 5 times a day) 200 each 3    polyethylene glycol (GLYCOLAX) 17 gram PwPk Take 17 g by mouth 2 (two) times daily. 180 each 0    spironolactone (ALDACTONE) 25 MG tablet Take 1 tablet (25 mg total) by mouth once daily. 30 tablet 6    valsartan (DIOVAN) 320 MG tablet Take 1 tablet (320 mg total) by mouth once daily. 90 tablet 3    [DISCONTINUED] glucagon, human recombinant, (GLUCAGON EMERGENCY KIT, HUMAN,) 1 mg SolR Inject 1 mg into the muscle as needed. Emergency use 1 each 1     No current facility-administered medications on file prior to visit.        Review of Systems   Constitutional: Positive for fatigue. Negative for fever, weight loss, appetite change and weakness.   HENT: Negative for postnasal drip, rhinorrhea, sinus pressure, trouble swallowing and congestion.    Respiratory: Positive for apnea, snoring and somnolence. Negative for cough, sputum production, choking, chest tightness, shortness of breath, wheezing and dyspnea on extertion.    Cardiovascular: Negative for chest pain and leg swelling.   Musculoskeletal: Negative for arthralgias, gait problem and joint swelling.   Gastrointestinal: Negative for nausea, vomiting and abdominal pain.   Neurological: Negative for dizziness, " "weakness and headaches.   All other systems reviewed and are negative.      Objective:       Vitals:    04/05/22 1135   Weight: 81.6 kg (180 lb)   Height: 4' 8" (1.422 m)       Physical Exam   Constitutional: She is oriented to person, place, and time. She appears well-developed and well-nourished. No distress. She is obese.   HENT:   Head: Normocephalic.   Mouth/Throat: Oropharynx is clear and moist.   Cardiovascular: Normal rate and regular rhythm.   Pulmonary/Chest: Effort normal and breath sounds normal. No respiratory distress. She has no wheezes. She has no rhonchi. She has no rales.   Musculoskeletal:         General: No edema.      Cervical back: Normal range of motion and neck supple.   Lymphadenopathy: No supraclavicular adenopathy is present.     She has no cervical adenopathy.   Neurological: She is alert and oriented to person, place, and time. Gait normal.   Skin: Skin is warm and dry.   Psychiatric:   Tearful at time of exam, received photos of her late sister at the start of her appointment   Vitals reviewed.    Personal Diagnostic Review    X-ray Shoulder 2 or More Views Right  Narrative: EXAMINATION:  XR SHOULDER COMPLETE 2 OR MORE VIEWS RIGHT    CLINICAL HISTORY:  Impingement syndrome of right shoulder    TECHNIQUE:  Two or three views of the right shoulder were performed.    COMPARISON:  Right shoulder radiographs May 19, 2020    FINDINGS:  No right shoulder fracture.  Alignment is normal.  Acromioclavicular and glenohumeral joint spaces are within normal limits.  Subtle calcifications noted along the lateral aspect of the humeral head suggestive of mild calcific tendinopathy.  No osseous erosion or suspicious osseous lesion.  Visualized lung is clear.  Impression: As above.    Electronically signed by: Ronaldo Aburto  Date:    03/21/2022  Time:    07:45    Comments added by Scorer: Sleep 4/5; REM 0/5.   Night #1: SOL 01:45 mins   Night #2 SOL 00:00 mins   Night #3 SOL 05:17 mins   Night #4 SOL " "20:00 mins   Night #5 SOL 03:23 mins   Mean Sleep Latency: 06:05 minutes   No sleep onset REMs present. This study does not suggest narcolepsy.   Total number of naps attempted: 5 . Total number of naps with sleep attained:4   Pathologic sleepiness is suggested by short mean sleep latency.   Pathologic Sleepiness (G47.10)   Idiopathic hypersomnia (G47.11)       Assessment/Plan:       Problem List Items Addressed This Visit     Hypersomnolence disorder - Primary    Relevant Medications    modafiniL (PROVIGIL) 200 MG Tab    Idiopathic hypersomnolence    Relevant Medications    modafiniL (PROVIGIL) 200 MG Tab        Patient was educated about the symptoms and signs of drowsy driving and about effective countermeasures. Symptoms of drowsy driving include difficulty focusing, frequent blinking, heavy eyelids, daydreaming, wandering/disconnected thoughts, difficulty remembering the last few miles driven (sometimes called "automatic behavior") or missing exits and street signs, frequent yawning, rubbing eyes, difficulty keeping head up, drifting from meredith to meredith, tailgating or hitting a shoulder, and feeling restless and irritable .  Patient was made  aware that the ability to self-rate sleepiness and performance is unreliable . Although performance continues to decline with cumulative days of sleep deprivation, subjective ratings of sleepiness tend to level off after the first few days in laboratory conditions of sleep deprivation , and drivers are often unaware of their own level of sleepiness .  Rather than driving while drowsy, patient was told to use other modes of transportation, such as ride sharing, public transportation, taxis, or walking. Importantly, drivers should be advised to plan ahead so that they avoid driving during times of day when they are likely to be sleepy, such as mid-afternoon, late at night, or after a period of sleep deprivation; to keep their trips short (under 20 minutes); and to use " alternative modes of transportation.  Patient was warned about the risk of driving while drowsy.  Patient was instructed that patients who are considered high-risk (eg, those with excessive daytime sleepiness and a history of a drowsy driving crash or near miss) should be warned not to drive until therapy has been instituted and proven effective.       Follow up in about 2 months (around 6/5/2022), or Virtual , added Modafinil.    This note was prepared using voice recognition system and is likely to have sound alike errors that may have been overlooked even after proof reading.  Please call me with any questions    Discussed diagnosis, its evaluation, treatment and usual course. All questions answered.    Thank you for the courtesy of participating in the care of this patient    Sylvain Rainey MD

## 2022-04-05 NOTE — PROCEDURES
"Comments added by Technician: .   Comments added by Scorer: Sleep 4/5; REM 0/5.   Night #1: SOL 01:45 mins   Night #2 SOL 00:00 mins   Night #3 SOL 05:17 mins   Night #4 SOL 20:00 mins   Night #5 SOL 03:23 mins   Mean Sleep Latency: 06:05 minutes   No sleep onset REMs present. This study does not suggest narcolepsy.   Total number of naps attempted: 5 . Total number of naps with sleep attained:4   Pathologic sleepiness is suggested by short mean sleep latency.   Pathologic Sleepiness (G47.10)   Idiopathic hypersomnia (G47.11)       See imported Sleep Study result in "Chart Review" under the   "Media tab".      (This Sleep Study was interpreted by a Board Certified Sleep  Specialist who conducted an epoch-by-epoch review of the entire  raw data recording.)     (The indication for this sleep study was reviewed and deemed  appropriate by [employer] Practice Parameters or other reasons by a  Board Certified Sleep Specialist.)      The sleep study that you ordered is complete.  You have ordered sleep LAB services to perform the sleep study for@       You can look  for the report in the  Media as a procedure document type.    As the ordering provider, you are responsible for reviewing the results and implementing a treatment plan with your patient.     If you need a Sleep Medicine provider to explain the sleep study findings and arrange treatment for the patient, please refer patient for consultation to our Sleep Clinic via [facility] with Ambulatory Consult Sleep.     To do that please place an order for an  "Ambulatory Consult Sleep" - it will go to our clinic work queue for our Medical Assistant to contact the patient for an appointment.     For any questions, please contact our clinic staff at  to talk to clinical staff or pulmonary nurse navigator    Sylvain Rainey MD      "

## 2022-04-05 NOTE — TELEPHONE ENCOUNTER
----- Message from Denise Cintron sent at 4/5/2022  9:11 AM CDT -----  Regarding: change to virtual today  Contact: pt  Pt would like to change her appt at 11:20 to a virtual visit. [phone]

## 2022-04-25 ENCOUNTER — TELEPHONE (OUTPATIENT)
Dept: PULMONOLOGY | Facility: CLINIC | Age: 52
End: 2022-04-25
Payer: MEDICAID

## 2022-04-25 ENCOUNTER — TELEPHONE (OUTPATIENT)
Dept: PHARMACY | Facility: CLINIC | Age: 52
End: 2022-04-25
Payer: MEDICAID

## 2022-04-26 ENCOUNTER — CLINICAL SUPPORT (OUTPATIENT)
Dept: DIABETES | Facility: CLINIC | Age: 52
End: 2022-04-26
Payer: MEDICAID

## 2022-04-26 ENCOUNTER — CLINICAL SUPPORT (OUTPATIENT)
Dept: PULMONOLOGY | Facility: CLINIC | Age: 52
End: 2022-04-26
Payer: MEDICAID

## 2022-04-26 VITALS
RESPIRATION RATE: 16 BRPM | WEIGHT: 184.75 LBS | OXYGEN SATURATION: 96 % | HEART RATE: 97 BPM | HEIGHT: 56 IN | BODY MASS INDEX: 41.56 KG/M2

## 2022-04-26 VITALS — BODY MASS INDEX: 41.37 KG/M2 | WEIGHT: 184.5 LBS

## 2022-04-26 DIAGNOSIS — J45.909 ASTHMA: Primary | ICD-10-CM

## 2022-04-26 DIAGNOSIS — Z79.4 TYPE 2 DIABETES MELLITUS WITH HYPERGLYCEMIA, WITH LONG-TERM CURRENT USE OF INSULIN: Chronic | ICD-10-CM

## 2022-04-26 DIAGNOSIS — E11.65 TYPE 2 DIABETES MELLITUS WITH HYPERGLYCEMIA, WITH LONG-TERM CURRENT USE OF INSULIN: Chronic | ICD-10-CM

## 2022-04-26 PROCEDURE — 99999 PR PBB SHADOW E&M-EST. PATIENT-LVL II: CPT | Mod: PBBFAC,,,

## 2022-04-26 PROCEDURE — 99212 OFFICE O/P EST SF 10 MIN: CPT | Mod: PBBFAC,27

## 2022-04-26 PROCEDURE — 99214 OFFICE O/P EST MOD 30 MIN: CPT | Mod: PBBFAC | Performed by: DIETITIAN, REGISTERED

## 2022-04-26 PROCEDURE — 99999 PR PBB SHADOW E&M-EST. PATIENT-LVL II: ICD-10-PCS | Mod: PBBFAC,,,

## 2022-04-26 PROCEDURE — 99999 PR PBB SHADOW E&M-EST. PATIENT-LVL IV: CPT | Mod: PBBFAC,,, | Performed by: DIETITIAN, REGISTERED

## 2022-04-26 PROCEDURE — 99999 PR PBB SHADOW E&M-EST. PATIENT-LVL IV: ICD-10-PCS | Mod: PBBFAC,,, | Performed by: DIETITIAN, REGISTERED

## 2022-04-26 PROCEDURE — G0108 DIAB MANAGE TRN  PER INDIV: HCPCS | Mod: PBBFAC | Performed by: DIETITIAN, REGISTERED

## 2022-04-26 NOTE — LETTER
April 26, 2022    Elizabeth Ceron, IRASEMA  [address]  [place] [address]         Patient: Yolanda Betts   MR Number: [id number]   YOB: 1970   Date of Visit: 4/26/2022       Dear Dr. Ceron:    Thank you for referring Yolanda for diabetes self-management education and support services. She has completed all components of our Diabetes Management Program. Below is a summary of her clinical outcomes and goal progress.    Patient Outcomes:    A1c Status:   Lab Results   Component Value Date    HGBA1C 6.2 (H) 03/08/2022     Diabetes Management Latest Ref Rng & Units 5/20/2021   HbA1c 4.0 - 5.6 % 7.1 (H)     Diabetes Management Latest Ref Rng & Units 3/24/2021   HbA1c 4.0 - 5.6 % 6.2 (H)     Care Plan: Diabetes Management   Updates made since 3/27/2022 12:00 AM      Problem: Blood Glucose Self-Monitoring       Goal: Patient agrees to check and record blood sugars 4 times per day (fst, ac, bed). Completed 4/26/2022   Start Date: 5/5/2021   Expected End Date: 4/26/2022   This Visit's Progress: Met   Recent Progress: Met   Priority: High   Barriers: No Barriers Identified   Note:    Encouraged pt to bring BG logs to clinic. Reviewed last few A1C results and encouraged progress.         Follow up:   · Yolanda to attend medical appointments as scheduled  · Yolanda to update you on her DM education progress as needed      If you have questions, please do not hesitate to call me. I look forward to providing additional education and support as referred.    Sincerely,    Yara Sanchez RD, CDE  Diabetes Care and

## 2022-04-26 NOTE — PROGRESS NOTES
Diabetes Care Specialist Progress Note  Author: Yara Sanchez RD, CDE  Date: 4/26/2022    Program Intake  Reason for Diabetes Program Visit:: Post Program Follow-Up  Education: Self-Management Skill Review, Nutrition and Meal Planning, Pattern Management  Current diabetes risk level:: moderate  In the last 12 months, have you:: used emergency room services (leg pain)  Was the ER or hospital admission related to diabetes?: No    Lab Results   Component Value Date    HGBA1C 6.2 (H) 03/08/2022     Diabetes Management Latest Ref Rng & Units 5/20/2021   HbA1c 4.0 - 5.6 % 7.1 (H)     Diabetes Management Latest Ref Rng & Units 3/24/2021   HbA1c 4.0 - 5.6 % 6.2 (H)     Clinical    Problem Review  Active comorbidities affecting diabetes self-care.: yes (HTN, HLD, Obesity, polyneuropathy, Schizoaffective Bipolar type, NAFLD, IBS, GERD. Bariatric sleeve gastrectomy 6/1/21.)    Clinical Assessment  Current Diabetes Treatment:  (Toujeo 90units daily, Novolog ac s/s, Metformin 1000mg twice daily. No longer on GLP1.)  Have you ever experienced hypoglycemia (low blood sugar)?: yes  In the last month, how often have you experienced low blood sugar?: once a day (pt reports symptoms upon waking up)  Are you able to tell when your blood sugar is low?: Yes  What symptoms do you experience?: Shaky, Irritable  Have you ever been hospitalized because your blood sugar was too low?: no  How do you treat hypoglycemia (low blood sugar)?:  (pt eats bfst)  Have you ever experienced hyperglycemia (high blood sugar)?: no    Medication Information  How many days a week do you miss your medications?: Never  Do you use a pill box or medication chart to help you manage your medications?: Pill box  Do you sometimes have difficulty refilling your medications?: No  Medication adherence impacting ability to self-manage diabetes?: No    Labs  Do you have regular lab work to monitor your medications?: Yes  Type of Regular Lab Work: A1c, Cholesterol,  Microalbumin, CBC, BMP  Where do you get your labs drawn?: [facility]  Lab Compliance Barriers: No    Nutritional Status  Diet:  (Recently stressful with irregular meal patterns)  Meal Plan 24 Hour Recall - Breakfast: nicole's sausage biscuit or chix kinjal a bowl  Meal Plan 24 Hour Recall - Lunch: dirty rice, ham, green beans - pt describes 1c total portion  Meal Plan 24 Hour Recall - Dinner: leftovers or cream wheat  Meal Plan 24 Hour Recall - Snack: marco: water; bariatric supplements: none  Change in appetite?: Yes (recently decreased due social stressors)  Recent Changes in Weight: Weight Loss  Was weight loss intentional or unintentional?: Intentional (~24lb wt loss since 5/3/21 prior bariatric surgery)  Current nutritional status an area of need that is impacting patient's ability to self-manage diabetes?: No    Diabetes Self-Management Skills Assessment    Nutrition/Healthy Eating  Challenges to healthy eating:: eating when feeling depressed/stressed (irregular meal patterns due recent social stressors)  Method of carbohydrate measurement::  (portion control by visual estimation)  Patient can identify foods that impact blood sugar.: yes  Nutrition/Healthy Eating Skills Assessment Completed:: Yes  Assessment indicates:: Instruction Needed  Area of need?: Yes    Physical Activity/Exercise  Patient's daily activity level::  (none recent - has gym membership at [employer])  Patient formally exercises outside of work.: no  Reasons for not exercising::  (social stressors, fatigue)  Patient can identify reasons why exercise/physical activity is important in diabetes management.: yes  Physical Activity/Exercise Skills Assessment Completed: : Yes  Assessment indicates:: Instruction Needed  Area of need?: Yes    Medications  Patient is able to describe current diabetes management routine.: yes  Patient is able to identify current diabetes medications, dosages, and appropriate timing of medications.: yes (However, pt is  taking higher basal dose than noted by Ms Ceron)  Patient understands the purpose of the medications taken for diabetes.: yes  Patient reports problems or concerns with current medication regimen.: yes  Medication regimen problems/concerns:: concerned about side effects  Medication Skills Assessment Completed:: Yes  Assessment indicates:: Instruction Needed  Area of need?: Yes    Home Blood Glucose Monitoring  Patient states that blood sugar is checked at home daily.: yes  Monitoring Method:: home glucometer  How often do you check your blood sugar?: 4 times a day  When you check what is your typical blood sugar range? : Per recall, fst/ac BG . Pt reports insurance not covering digial diabetes supplies.  Blood glucose logs:: encouraged to bring logs to provider visits, no  Home Blood Glucose Monitoring Skills Assessment Completed: : Yes  Assessment indicates:: Adequate understanding  Area of need?: No    Assessment Summary and Plan  Based on today's diabetes care assessment, the following areas of need were identified:      Social 5/5/2021   Support No   Access to Mass Media/Tech No   Cognitive/Behavioral Health No   Culture/Jainism No   Communication No   Health Literacy No        Clinical 4/26/2022   Medication Adherence No   Lab Compliance No   Nutritional Status No        Diabetes Self-Management Skills 4/26/2022   Nutrition/Healthy Eating Yes - Reviewed micro/macronutrient effect on health management. Discussed carbohydrate counting and spacing. Reviewed principles of energy metabolism to assist weight and health management.     Encouraged MR shake daily to improve protein intake. Encouraged pt to discuss bariatric vitamin lab screening at upcoming pcp visit; reviewed common vit/min and pro supplements used after bariatric surgery.   Physical Activity/Exercise Yes -Discussed importance of daily physical activity with review of benefits, methods, guidelines and precautions. Discussed strategies to obtain  10-15 min walking sessions to assist with maintaining BG levels and stress mgtm.    Medication Yes -Provided review of current diabetes medication action, dosage, frequency, side effects. Reviewed notes from Ifeanyi for pt to reduce toujeo 86units daily to help prevent am hypoglycemia.    Home Blood Glucose Monitoring No - see care plan        Today's interventions were provided through individual discussion, instruction, and written materials were provided.    Patient verbalized understanding of instruction and written materials.  Pt was able to return back demonstration of instructions today. Patient understood key points, needs reinforcement and further instruction.     Diabetes Self-Management Care Plan:  Today's Diabetes Self-Management Care Plan was developed with Yolanda's input. Yolanda has agreed to work toward the following goal(s) to improve his/her overall diabetes control.      Care Plan: Diabetes Management   Updates made since 3/27/2022 12:00 AM      Problem: Blood Glucose Self-Monitoring       Goal: Patient agrees to check and record blood sugars 4 times per day (fst, ac, bed). Completed 4/26/2022   Start Date: 5/5/2021   Expected End Date: 4/26/2022   This Visit's Progress: Met   Recent Progress: Met   Priority: High   Barriers: No Barriers Identified   Note:    Encouraged pt to bring BG logs to clinic. Reviewed last few A1C results and encouraged progress.         Follow Up Plan   Follow up as referred.    Today's care plan and follow up schedule was discussed with patient.  Yolanda verbalized understanding of the care plan, goals, and agrees to follow up plan.        The patient was encouraged to communicate with his/her health care provider/physician and care team regarding his/her condition(s) and treatment.  I provided the patient with my contact information today and encouraged to contact me via phone or [facility] Patient Portal as needed.     Length of Visit   Total Time: 60 Minutes

## 2022-04-26 NOTE — PROGRESS NOTES
"Pulmonary Disease Management  [facility]  Final Follow up Visit  Chronic Care Management    Yolanda Betts  was seen 4/26/2022  8:30 AM in the [facility] for evaluation, education, reinforcement of self management techniques and exacerbation action plan.    Toño Villalta    Past Medical History:   Diagnosis Date    Abnormal Pap smear of cervix     HPV genital warts    Anemia     Anxiety     Arthritis     Asthma 10/11/2016    Bipolar 1 disorder     Diabetes mellitus, type 2     Dyslipidemia associated with type 2 diabetes mellitus 5/13/2019    General anesthetics causing adverse effect in therapeutic use     Genital warts     GERD (gastroesophageal reflux disease)     Herpes simplex virus (HSV) infection     Hyperlipidemia     Hypertension     Hypertension complicating diabetes 5/5/2019    Migraine with aura and without status migrainosus, not intractable 3/21/2016    Mild persistent asthma without complication 10/11/2016    Morbid obesity with body mass index (BMI) of 45.0 to 49.9 in adult 8/3/2017    Obstructive sleep apnea     ALEN (obstructive sleep apnea)     2L per N/C q HS    Schizoaffective disorder, bipolar type 4/25/2019    Seasonal allergic rhinitis due to pollen 5/13/2019    Type 2 diabetes mellitus with hyperglycemia, with long-term current use of insulin 12/21/2017    Type 2 diabetes mellitus with hyperglycemia, without long-term current use of insulin 12/21/2017       Patient's Medications   New Prescriptions    ALBUTEROL (PROVENTIL/VENTOLIN HFA) 90 MCG/ACTUATION INHALER    Inhale 2 puffs into the lungs every 6 hours as needed for wheezing   Previous Medications    AMLODIPINE (NORVASC) 5 MG TABLET    Take 1 tablet (5 mg total) by mouth every evening.    ASPIRIN (ECOTRIN) 81 MG EC TABLET    Take 81 mg by mouth once daily.    BD INSULIN SYRINGE ULTRA-FINE 1/2 ML 30 GAUGE X 1/2" SYRG        BLOOD SUGAR DIAGNOSTIC (ONETOUCH ULTRA TEST) STRP   "  Check blood glucose 4 times daily as directed and as needed (dispense insurance preferred brand or patient choice)    BLOOD SUGAR DIAGNOSTIC STRP    1 each by Misc.(Non-Drug; Combo Route) route 4 (four) times daily. [facility] Glucose Test Strips    BLOOD-GLUCOSE METER MISC    Use as directed    BUDESONIDE-FORMOTEROL 160-4.5 MCG (SYMBICORT) 160-4.5 MCG/ACTUATION HFAA    Inhale 2 puffs into the lungs every 12 (twelve) hours. Controller : Use spacer    CLONAZEPAM (KLONOPIN) 1 MG TABLET    Take 1 tablet (1 mg total) by mouth once daily.    CYCLOBENZAPRINE (FLEXERIL) 10 MG TABLET    Take 0.5-1 tablets (5-10 mg total) by mouth 2 (two) times daily as needed for Muscle spasms. May cause drowsiness.    CYCLOSPORINE 0.05 % DROP    Place 1 drop into both eyes 2 (two) times daily.    DICLOFENAC SODIUM (VOLTAREN) 1 % GEL    Apply 2 grams topically 4 (four) times daily as needed.    DULOXETINE (CYMBALTA) 30 MG CAPSULE    Take 1 capsule (30 mg total) by mouth every evening.    FENOFIBRATE (TRICOR) 54 MG TABLET    Take 1 tablet (54 mg total) by mouth once daily.    FISH OIL-OMEGA-3 FATTY ACIDS 300-1,000 MG CAPSULE    Take 1 capsule by mouth once daily.    FLASH GLUCOSE SCANNING READER (FREESTYLE AMY 2 READER) [facility] [place]    Use as directed to check blood sugar    FROVATRIPTAN (FROVA) 2.5 MG TABLET    Take 1 tablet at onset of acute migraine. May take 1 more tablet 2 hours later if needed. (MAX 2 TABLET PER DAY. MAX 4 TABLETS PER WEEK.)    FUROSEMIDE (LASIX) 20 MG TABLET    Take 1 tablet (20 mg total) by mouth once daily.    INSULIN ASPART U-100 (NOVOLOG FLEXPEN U-100 INSULIN) 100 UNIT/ML (3 ML) INPN PEN    Inject 10 Units into the skin 3 (three) times daily before meals. Use based on sliding scale    INSULIN GLARGINE U-300 CONC (TOUJEO MAX U-300 SOLOSTAR) 300 UNIT/ML (3 ML) INSULIN PEN    Inject 90 Units into the skin every evening.    LANCETS (ONETOUCH DELICA PLUS LANCET) 30 GAUGE MISC    Use as directed 3 times daily     "LIDOCAINE-PRILOCAINE (EMLA) CREAM    Apply topically up to 4 times per day to affected area for pain relief    LURASIDONE (LATUDA) 60 MG TAB TABLET    Take 1 tablet by mouth daily with 350 calories    MAGNESIUM OXIDE (MAG-OX) 400 MG (241.3 MG MAGNESIUM) TABLET    Take 1 tablet (400 mg total) by mouth once daily.    METFORMIN (GLUCOPHAGE) 1000 MG TABLET    Take 1 tablet (1,000 mg total) by mouth 2 (two) times daily with meals.    METHYLPREDNISOLONE (MEDROL DOSEPACK) 4 MG TABLET    Take as directed    MILNACIPRAN (SAVELLA) 25 MG TAB TABLET    Take 1 tablet (25 mg total) by mouth 2 (two) times daily.    MODAFINIL (PROVIGIL) 200 MG TAB    Take 1 tablet (200 mg total) by mouth once daily.    MULTIVIT,CALC,MINS/IRON/FOLIC (DAILY MULTIPLE FOR WOMEN ORAL)    Take 1 tablet by mouth once daily.    PEN NEEDLE, DIABETIC (BD ULTRA-FINE MINI PEN NEEDLE) 31 GAUGE X 3/16" NDLE    Use 5 a day    PEN NEEDLE, DIABETIC (BD ULTRA-FINE MINI PEN NEEDLE) 31 GAUGE X 3/16" NDLE    Use one needle 5 times a day    SPIRONOLACTONE (ALDACTONE) 25 MG TABLET    Take 1 tablet (25 mg total) by mouth once daily.    VALSARTAN (DIOVAN) 320 MG TABLET    Take 1 tablet (320 mg total) by mouth once daily.   Modified Medications    No medications on file   Discontinued Medications    No medications on file         Educational assessment:   [x]            Good  []            Fair  []            Poor    Readiness to learn:   [x]            Good  []            Fair  []            Poor    Vision Status:   [x]            Good  []            Fair  []            Poor    Reading Ability:  [x]            Good  []            Fair  []            Poor    Knowledge of condition:   [x]            Good  []            Fair  []            Poor    Language Barriers:   []            Good  []            Fair  []            Poor  [x]            None    Cognitive/ Physical Barriers:   []            Good  []            Fair  []            Poor  [x]            None    Learning best " by:                       [x]            Seeing  []            Hearing  []            Reading                         [x]            Doing    Describe any barrier /Limitation or financial implications of care choices identified     []            Financial  []            Emotional  []            Education  []            Vision/Hearing  []            Physical  [x]            None           TOPIC /CONTENT FOR TODAY:    [x]            MDI with or without spacer  [x]            Dry powder inhaler  []            Acapella   []           Peak Flow meter  [x]            Asthma ction plan  [x]            Nebulizer use  [x]            Oxygen use safety  [x]            Breathing and cough techniques  [x]            Energy conservation  [x]            Infection prevention  []           OTHER________________________        Learner:    [x]            Patient   []            Caregiver    Method:    [x]            Verbal explanation  [x]            Audio visual    [x]            Literature  [x]            Teach back      Evaluation:    [x]            Teach back  [x]            Demonstrate  [x]            Follow up phone call    []            2 weeks     [x]            4 weeks   [x]            PRN    Additional comments:   Patient was seen today to review respiratory medication purpose and proper technique for use of inhalers. Patient practiced proper technique using MDI with valved holding chamber (spacer). Patient demonstrated understanding. Literature was given to patient.     Educated patient on the importance of utilizing supplemental oxygen when prescribed. Reviewed strategies for maximizing energy. Patient verbalized understanding. Literature given to patient.      Asthma trigger checklist was verbally reviewed and literature given to patient.     Infection prevention was discussed. Patient's immunizations are current. Hand hygiene and cleaning of respiratory equipment was also discussed. Patient verbalized understanding.       Asthma action plan was reviewed and literature was given to patient. Patient verbalized understanding.

## 2022-05-02 ENCOUNTER — OFFICE VISIT (OUTPATIENT)
Dept: PSYCHIATRY | Facility: CLINIC | Age: 52
End: 2022-05-02
Payer: MEDICAID

## 2022-05-02 ENCOUNTER — PATIENT MESSAGE (OUTPATIENT)
Dept: PSYCHIATRY | Facility: CLINIC | Age: 52
End: 2022-05-02
Payer: MEDICAID

## 2022-05-02 DIAGNOSIS — F41.1 GENERALIZED ANXIETY DISORDER: Chronic | ICD-10-CM

## 2022-05-02 DIAGNOSIS — F31.30 BIPOLAR I DISORDER, MOST RECENT EPISODE (OR CURRENT) DEPRESSED: Primary | ICD-10-CM

## 2022-05-02 PROCEDURE — 3072F PR LOW RISK FOR RETINOPATHY: ICD-10-PCS | Mod: HP,HB,CPTII,95 | Performed by: PSYCHOLOGIST

## 2022-05-02 PROCEDURE — 99214 OFFICE O/P EST MOD 30 MIN: CPT | Mod: HP,HB,95, | Performed by: PSYCHOLOGIST

## 2022-05-02 PROCEDURE — 3044F HG A1C LEVEL LT 7.0%: CPT | Mod: HP,HB,CPTII,95 | Performed by: PSYCHOLOGIST

## 2022-05-02 PROCEDURE — 3066F PR DOCUMENTATION OF TREATMENT FOR NEPHROPATHY: ICD-10-PCS | Mod: HP,HB,CPTII,95 | Performed by: PSYCHOLOGIST

## 2022-05-02 PROCEDURE — 1159F MED LIST DOCD IN RCRD: CPT | Mod: HP,HB,CPTII,95 | Performed by: PSYCHOLOGIST

## 2022-05-02 PROCEDURE — 3061F NEG MICROALBUMINURIA REV: CPT | Mod: HP,HB,CPTII,95 | Performed by: PSYCHOLOGIST

## 2022-05-02 PROCEDURE — 3072F LOW RISK FOR RETINOPATHY: CPT | Mod: HP,HB,CPTII,95 | Performed by: PSYCHOLOGIST

## 2022-05-02 PROCEDURE — 99214 PR OFFICE/OUTPT VISIT, EST, LEVL IV, 30-39 MIN: ICD-10-PCS | Mod: HP,HB,95, | Performed by: PSYCHOLOGIST

## 2022-05-02 PROCEDURE — 1159F PR MEDICATION LIST DOCUMENTED IN MEDICAL RECORD: ICD-10-PCS | Mod: HP,HB,CPTII,95 | Performed by: PSYCHOLOGIST

## 2022-05-02 PROCEDURE — 3066F NEPHROPATHY DOC TX: CPT | Mod: HP,HB,CPTII,95 | Performed by: PSYCHOLOGIST

## 2022-05-02 PROCEDURE — 3044F PR MOST RECENT HEMOGLOBIN A1C LEVEL <7.0%: ICD-10-PCS | Mod: HP,HB,CPTII,95 | Performed by: PSYCHOLOGIST

## 2022-05-02 PROCEDURE — 3061F PR NEG MICROALBUMINURIA RESULT DOCUMENTED/REVIEW: ICD-10-PCS | Mod: HP,HB,CPTII,95 | Performed by: PSYCHOLOGIST

## 2022-05-02 RX ORDER — CLONAZEPAM 1 MG/1
1 TABLET ORAL DAILY
Qty: 30 TABLET | Refills: 2 | Status: SHIPPED | OUTPATIENT
Start: 2022-05-02 | End: 2022-09-27 | Stop reason: SDUPTHER

## 2022-05-02 RX ORDER — DULOXETIN HYDROCHLORIDE 30 MG/1
30 CAPSULE, DELAYED RELEASE ORAL NIGHTLY
Qty: 30 CAPSULE | Refills: 2 | Status: SHIPPED | OUTPATIENT
Start: 2022-05-02 | End: 2022-05-09

## 2022-05-02 RX ORDER — LURASIDONE HYDROCHLORIDE 60 MG/1
TABLET, FILM COATED ORAL
Qty: 30 TABLET | Refills: 2 | Status: SHIPPED | OUTPATIENT
Start: 2022-05-02 | End: 2022-09-23 | Stop reason: SDUPTHER

## 2022-05-02 NOTE — PROGRESS NOTES
"Outpatient Psychiatry Follow-Up Visit    5/2/2022    Timeframe: Corona Virus Outbreak     The patient location is: Patient's car/ Patient reported that his/her location at the time of this visit was in the [facility]     Visit type: Virtual visit with synchronous audio and video--We had to use audio via speakerphone and continue the video through the Mychart due to technical difficulties and audio quality. We ended up completing with audio only.    Each patient to whom he or she provides medical services by telehealth is: (1) informed of the relationship between the medical psychologist and patient and the respective role of any other health care provider with respect to management of the patient; and (2) notified that he or she may decline to receive medical services by telehealth and may withdraw from such care at any time.    I also informed patient of the following:   Mariam Young, PhD, MPAP:  [place] medical license number: [id number]    My contact info:  [facility] 2nd Floor  [address] [place] [address]   Ph: [phone]    If technology issues, call office phone: Ph: [phone]  If crisis: Dial 911 or go to nearest Emergency Room (ER)  If questions related to privacy practices: contact [facility] Information Department: [phone]    Chief Complaint:  Yolanda Betts is a 51 y.o. female who presents today for follow-up of mood and anxiety.       Impressions/Plan from last visit: Yolanda attended her virtual visit. She is buying a house--her closing date is 3/31. She has everything lined up; will be getting a moving truck today. The "man I'm living with" is helping her move. She plans to end the relationship after she moves. Work is still going okay--"it's stressful, but I'm hanging in there." She has still be sleeping excessively--has her sleep study on 3/29 and 3/30. In the mornings once she sits at her computer desk, she is sleepy. " "Around 2 or 3, she is "okay." She did not get as much sleep last night--blood sugar has been low. When she takes Klonopin, it makes her "relaxed"--it does not make her sleepy. We had decreased her medicines, but she has had an increase in her pain. She has been stressed with getting all of the paperwork for closing; and juggling work. She wants to continue medicines as currently prescribed--continue Cymbalta 30 mg; Latuda to 60 mg; Klonopin 1 mg daily.      since November 2021.    Interval History and Content of Current Session: Yolanda attended her virtual visit. She has been taking her medicines and believes that they are working. She moved in to her new house this past weekend; is hoping to get back to the gym. She has been tired from the move and stress of the closing. She thinks that Cymbalta and Latuda make her sleepy and takes them at night. She reported that when she bends down, she feels dizzy--will follow up with her provider (more noticeable when unpacking and moving). For now, she wants to continue her medicines as prescribed. Continue Cymbalta 30 mg; Latuda to 60 mg; Klonopin 1 mg daily.     since January 2022.        GAD7 5/2/2022 3/23/2022 2/18/2022   1. Feeling nervous, anxious, or on edge? 1 1 1   2. Not being able to stop or control worrying? 1 1 1   3. Worrying too much about different things? 1 2 1   4. Trouble relaxing? 1 2 1   5. Being so restless that it is hard to sit still? 1 1 0   6. Becoming easily annoyed or irritable? 1 1 1   7. Feeling afraid as if something awful might happen? 0 1 1   JOANN-7 Score 6 9 6      0-4 = Minimal anxiety  5-9 = Mild anxiety  10-14 = Moderate anxiety  15-21 = Severe anxiety       Review of Systems   · PSYCHIATRIC: Pertinant items are noted in the narrative.    Past Medical, Family and Social History: The patient's past medical, family and social history have been reviewed and updated as appropriate within the electronic medical record - see encounter " "notes.      Current Outpatient Medications:     albuterol (PROVENTIL/VENTOLIN HFA) 90 mcg/actuation inhaler, Inhale 2 puffs into the lungs every 6 hours as needed for wheezing, Disp: 8.5 g, Rfl: 3    amLODIPine (NORVASC) 5 MG tablet, Take 1 tablet (5 mg total) by mouth every evening., Disp: 30 tablet, Rfl: 11    aspirin (ECOTRIN) 81 MG EC tablet, Take 81 mg by mouth once daily., Disp: , Rfl:     BD INSULIN SYRINGE ULTRA-FINE 1/2 mL 30 gauge x 1/2" Syrg, , Disp: , Rfl: 0    blood sugar diagnostic (ONETOUCH ULTRA TEST) Strp, Check blood glucose 4 times daily as directed and as needed (dispense insurance preferred brand or patient choice), Disp: 200 each, Rfl: 5    blood sugar diagnostic Strp, 1 each by Misc.(Non-Drug; Combo Route) route 4 (four) times daily. [facility] Glucose Test Strips, Disp: 400 strip, Rfl: 3    blood-glucose meter Misc, Use as directed (Patient taking differently: Use as directed), Disp: 1 each, Rfl: 0    budesonide-formoterol 160-4.5 mcg (SYMBICORT) 160-4.5 mcg/actuation HFAA, Inhale 2 puffs into the lungs every 12 (twelve) hours. Controller : Use spacer, Disp: 10.2 g, Rfl: 11    clonazePAM (KLONOPIN) 1 MG tablet, Take 1 tablet (1 mg total) by mouth once daily., Disp: 30 tablet, Rfl: 2    cyclobenzaprine (FLEXERIL) 10 MG tablet, Take 0.5-1 tablets (5-10 mg total) by mouth 2 (two) times daily as needed for Muscle spasms. May cause drowsiness., Disp: 90 tablet, Rfl: 0    cycloSPORINE 0.05 % Drop, Place 1 drop into both eyes 2 (two) times daily., Disp: 5.5 mL, Rfl: 11    diclofenac sodium (VOLTAREN) 1 % Gel, Apply 2 grams topically 4 (four) times daily as needed., Disp: 5 each, Rfl: 0    DULoxetine (CYMBALTA) 30 MG capsule, Take 1 capsule (30 mg total) by mouth every evening., Disp: 30 capsule, Rfl: 2    fenofibrate (TRICOR) 54 MG tablet, Take 1 tablet (54 mg total) by mouth once daily., Disp: 30 tablet, Rfl: 3    fish oil-omega-3 fatty acids 300-1,000 mg capsule, Take 1 capsule by " mouth once daily., Disp: , Rfl:     flash glucose scanning reader (FREESTYLE AMY 2 READER) [facility], Use as directed to check blood sugar, Disp: 1 each, Rfl: 1    frovatriptan (FROVA) 2.5 MG tablet, Take 1 tablet at onset of acute migraine. May take 1 more tablet 2 hours later if needed. (MAX 2 TABLET PER DAY. MAX 4 TABLETS PER WEEK.), Disp: 9 tablet, Rfl: 1    furosemide (LASIX) 20 MG tablet, Take 1 tablet (20 mg total) by mouth once daily., Disp: 30 tablet, Rfl: 11    insulin aspart U-100 (NOVOLOG FLEXPEN U-100 INSULIN) 100 unit/mL (3 mL) InPn pen, Inject 10 Units into the skin 3 (three) times daily before meals. Use based on sliding scale, Disp: 15 mL, Rfl: 3    insulin glargine U-300 conc (TOUJEO MAX U-300 SOLOSTAR) 300 unit/mL (3 mL) insulin pen, Inject 90 Units into the skin every evening., Disp: 4 pen, Rfl: 3    lancets (ONETOUCH DELICA PLUS LANCET) 30 gauge Misc, Use as directed 3 times daily (Patient taking differently: Use as directed 3 times daily), Disp: 100 each, Rfl: 11    LIDOcaine-prilocaine (EMLA) cream, Apply topically up to 4 times per day to affected area for pain relief (Patient taking differently: Apply topically up to 4 times per day to affected area for pain relief), Disp: 60 g, Rfl: 0    lurasidone (LATUDA) 60 mg Tab tablet, Take 1 tablet by mouth daily with 350 calories, Disp: 30 tablet, Rfl: 2    magnesium oxide (MAG-OX) 400 mg (241.3 mg magnesium) tablet, Take 1 tablet (400 mg total) by mouth once daily., Disp: 90 tablet, Rfl: 3    metFORMIN (GLUCOPHAGE) 1000 MG tablet, Take 1 tablet (1,000 mg total) by mouth 2 (two) times daily with meals., Disp: 180 tablet, Rfl: 0    milnacipran (SAVELLA) 25 mg Tab tablet, Take 1 tablet (25 mg total) by mouth 2 (two) times daily., Disp: 60 tablet, Rfl: 5    modafiniL (PROVIGIL) 200 MG Tab, Take 1 tablet (200 mg total) by mouth once daily., Disp: 30 tablet, Rfl: 4    MULTIVIT,CALC,MINS/IRON/FOLIC (DAILY MULTIPLE FOR WOMEN ORAL), Take 1 tablet  "by mouth once daily., Disp: , Rfl:     pen needle, diabetic (BD ULTRA-FINE MINI PEN NEEDLE) 31 gauge x 3/16" Ndle, Use 5 a day, Disp: 200 each, Rfl: 3    pen needle, diabetic (BD ULTRA-FINE MINI PEN NEEDLE) 31 gauge x 3/16" Ndle, Use one needle 5 times a day (Patient taking differently: Use one needle 5 times a day), Disp: 200 each, Rfl: 3    spironolactone (ALDACTONE) 25 MG tablet, Take 1 tablet (25 mg total) by mouth once daily., Disp: 30 tablet, Rfl: 6    valsartan (DIOVAN) 320 MG tablet, Take 1 tablet (320 mg total) by mouth once daily., Disp: 90 tablet, Rfl: 3    Compliance: yes    Side effects: None    Risk Parameters:  Patient reports no suicidal ideation  Patient reports no homicidal ideation  Patient reports no self-injurious behavior  Patient reports no violent behavior    Exam (detailed: at least 9 elements; comprehensive: all 15 elements)   Constitutional  Vitals:  Most recent vital signs were reviewed.   Last 3 sets of Vitals    Vitals - 1 value per visit 4/5/2022 4/26/2022 4/26/2022   SYSTOLIC - - -   DIASTOLIC - - -   Pulse - - 97   Temp - - -   Resp - - 16   SPO2 - - 96   Weight (lb) 180 184.53 184.75   Weight (kg) 81.647 83.7 83.8   Height 56 - 56   BMI (Calculated) 40.4 - 41.4   VISIT REPORT - - -   Pain Score  - - -   Some recent data might be hidden          General:  age appropriate, casually dressed, neatly groomed, wearing glasses     Musculoskeletal  Muscle Strength/Tone:  no tremor, no tic   Gait & Station:  video visit     Psychiatric  Speech:  no latency; no press   Behavior: wnl   Mood & Affect:  "tired"  congruent and appropriate   Thought Process:  normal and logical   Associations:  intact   Thought Content:  normal, no suicidality, no homicidality, delusions, or paranoia   Insight:  has awareness of illness   Judgement: behavior is adequate to circumstances   Orientation:  grossly intact   Memory: intact for content of interview   Language: grossly intact   Attention Span & " "Concentration:  Grossly intact   Fund of Knowledge:  intact and appropriate to age and level of education     Assessment and Diagnosis   Status/Progress: Based on the examination today, the patient's problem(s) is/are fairly well controlled.  New problems (move, dizziness) have been presented today.   Co-morbidities are complicating management of the primary condition.  There are no active rule-out diagnoses for this patient at this time.     General Impression:     Encounter Diagnoses   Name Primary?    Bipolar I disorder, most recent episode (or current) depressed Yes    Generalized anxiety disorder          Intervention/Counseling/Treatment Plan   · Medication Management: Discussed risks, benefits, and alternatives to treatment plan documented above with patient. I answered all patient questions related to this plan, and patient expressed understanding and agreement.   continue Cymbalta 30 mg; Latuda to 60 mg; Klonopin 1 mg daily  · continue therapy   · Follow-up with provider on dizziness    Medication List with Changes/Refills   Current Medications    ALBUTEROL (PROVENTIL/VENTOLIN HFA) 90 MCG/ACTUATION INHALER    Inhale 2 puffs into the lungs every 6 hours as needed for wheezing    AMLODIPINE (NORVASC) 5 MG TABLET    Take 1 tablet (5 mg total) by mouth every evening.    ASPIRIN (ECOTRIN) 81 MG EC TABLET    Take 81 mg by mouth once daily.    BD INSULIN SYRINGE ULTRA-FINE 1/2 ML 30 GAUGE X 1/2" SYRG        BLOOD SUGAR DIAGNOSTIC (ONETOUCH ULTRA TEST) STRP    Check blood glucose 4 times daily as directed and as needed (dispense insurance preferred brand or patient choice)    BLOOD SUGAR DIAGNOSTIC STRP    1 each by Misc.(Non-Drug; Combo Route) route 4 (four) times daily. [facility] Glucose Test Strips    BLOOD-GLUCOSE METER MISC    Use as directed    BUDESONIDE-FORMOTEROL 160-4.5 MCG (SYMBICORT) 160-4.5 MCG/ACTUATION HFAA    Inhale 2 puffs into the lungs every 12 (twelve) hours. Controller : Use spacer    " CYCLOBENZAPRINE (FLEXERIL) 10 MG TABLET    Take 0.5-1 tablets (5-10 mg total) by mouth 2 (two) times daily as needed for Muscle spasms. May cause drowsiness.    CYCLOSPORINE 0.05 % DROP    Place 1 drop into both eyes 2 (two) times daily.    DICLOFENAC SODIUM (VOLTAREN) 1 % GEL    Apply 2 grams topically 4 (four) times daily as needed.    FENOFIBRATE (TRICOR) 54 MG TABLET    Take 1 tablet (54 mg total) by mouth once daily.    FISH OIL-OMEGA-3 FATTY ACIDS 300-1,000 MG CAPSULE    Take 1 capsule by mouth once daily.    FLASH GLUCOSE SCANNING READER (FREESTYLE AMY 2 READER) [facility]    Use as directed to check blood sugar    FROVATRIPTAN (FROVA) 2.5 MG TABLET    Take 1 tablet at onset of acute migraine. May take 1 more tablet 2 hours later if needed. (MAX 2 TABLET PER DAY. MAX 4 TABLETS PER WEEK.)    FUROSEMIDE (LASIX) 20 MG TABLET    Take 1 tablet (20 mg total) by mouth once daily.    INSULIN ASPART U-100 (NOVOLOG FLEXPEN U-100 INSULIN) 100 UNIT/ML (3 ML) INPN PEN    Inject 10 Units into the skin 3 (three) times daily before meals. Use based on sliding scale    INSULIN GLARGINE U-300 CONC (TOUJEO MAX U-300 SOLOSTAR) 300 UNIT/ML (3 ML) INSULIN PEN    Inject 90 Units into the skin every evening.    LANCETS (ONETOUCH DELICA PLUS LANCET) 30 GAUGE MISC    Use as directed 3 times daily    LIDOCAINE-PRILOCAINE (EMLA) CREAM    Apply topically up to 4 times per day to affected area for pain relief    MAGNESIUM OXIDE (MAG-OX) 400 MG (241.3 MG MAGNESIUM) TABLET    Take 1 tablet (400 mg total) by mouth once daily.    METFORMIN (GLUCOPHAGE) 1000 MG TABLET    Take 1 tablet (1,000 mg total) by mouth 2 (two) times daily with meals.    MILNACIPRAN (SAVELLA) 25 MG TAB TABLET    Take 1 tablet (25 mg total) by mouth 2 (two) times daily.    MODAFINIL (PROVIGIL) 200 MG TAB    Take 1 tablet (200 mg total) by mouth once daily.    MULTIVIT,CALC,MINS/IRON/FOLIC (DAILY MULTIPLE FOR WOMEN ORAL)    Take 1 tablet by mouth once daily.    PEN NEEDLE,  "DIABETIC (BD ULTRA-FINE MINI PEN NEEDLE) 31 GAUGE X 3/16" NDLE    Use 5 a day    PEN NEEDLE, DIABETIC (BD ULTRA-FINE MINI PEN NEEDLE) 31 GAUGE X 3/16" NDLE    Use one needle 5 times a day    SPIRONOLACTONE (ALDACTONE) 25 MG TABLET    Take 1 tablet (25 mg total) by mouth once daily.    VALSARTAN (DIOVAN) 320 MG TABLET    Take 1 tablet (320 mg total) by mouth once daily.   Changed and/or Refilled Medications    Modified Medication Previous Medication    CLONAZEPAM (KLONOPIN) 1 MG TABLET clonazePAM (KLONOPIN) 1 MG tablet       Take 1 tablet (1 mg total) by mouth once daily.    Take 1 tablet (1 mg total) by mouth once daily.    DULOXETINE (CYMBALTA) 30 MG CAPSULE DULoxetine (CYMBALTA) 30 MG capsule       Take 1 capsule (30 mg total) by mouth every evening.    Take 1 capsule (30 mg total) by mouth every evening.    LURASIDONE (LATUDA) 60 MG TAB TABLET lurasidone (LATUDA) 60 mg Tab tablet       Take 1 tablet by mouth daily with 350 calories    Take 1 tablet by mouth daily with 350 calories   Discontinued Medications    METHYLPREDNISOLONE (MEDROL DOSEPACK) 4 MG TABLET    Take as directed        Return to Clinic: 3 months    Time spent with pt including note preparation: 23 minutes       Mariam Young, PhD, MP  Advanced Practice Medical Psychologist  [facility]  [place] [address]  [phone] ph  [phone] fax      "

## 2022-05-02 NOTE — PATIENT INSTRUCTIONS
"[facility]PARTMENT OF PSYCHIATRY   PATIENT INFORMATION    We appreciate the opportunity to participate in your medical care and hope the following protocols will make it easier for you to receive quality treatment in our department.    PUNCTUALITY: Your appointment is scheduled for a fixed amount of time, reserved especially for you.  To get the benefit of your appointment, please arrive at least 15 minutes early to allow time for traffic, parking and registration.  Should you arrive more than 15 minutes late to your appointment, you will be rescheduled in order to assure your clinician has adequate time to assess you and provide helpful care.      APPOINTMENTS: Appointments are made by the nursing/front office staff or through the patient portal. Providers do not have access  to schedule appointments. Walk in appointments are not available. FOR EMERGENCIES, PLEASE GO THE CLOSEST EMERGENCY ROOM.    CANCELLATION/MISSED APPOINTMENTS:   In order to receive quality care, all appointments must be kept.  If you are unable to keep an appointment, please reschedule at least 3 days prior if possible. Late cancellations (within 24 hours of the appointment) and repeated no-show appointments may result in dismissal from the clinic. After two no show/late cancellation visits, you will receive a notice letter, alerting you to keep visits to prevent department dismissal. If another visit is missed after receipt of the notice, you will be discharged from the clinic. This policy is in effect to allow for other individuals on a long waiting list to be seen as soon as possible. Unlike other branches of medicine where several individuals can be scheduled in a 30 minute time slot, only one individual can be scheduled in any time slot in Psychiatry.     MESSAGES: For simple questions/concerns, you may contact your individual providers electronically through the "[facility]" portal or by calling [phone] " with messages relayed via office staff. If relevant, include pharmacy name and phone number, date of last visit and next scheduled visit, phone number where you can be reached throughout the day, and whether leaving a voicemail or message on an answering machine is acceptable. Messages will be returned by the Medical Assistant or Office Staff after your provider has reviewed the message.  Please allow 24 hours for a returned message before leaving another message. Messages will be checked each workday (Monday through Friday) during office hours (8:00 a.m. and 5:00 p.m.) and returned at most within one business day.  You may leave a non-urgent message after hours. Note that psychotherapy and medication management are not appropriate by telephone or the patient portal.    PRESCRIPTION REFILLS:  Please communicate with your prescriber about any refills you need during your appointment. You may also request refills through the [facility] portal (preferred) or by calling the clinic. Prescriptions will be filled during office hours.     Please do not wait until you are completely out of medication to request refills. Same day refills are not always possible. Patients may experience symptoms of withdrawal if they run out of medications. The patient assumes all responsibility when there is an issue with non-compliance with follow-up appointments and medications.  Some medications are controlled and regulated by the FDA and [place]. Some of these medications can not be refilled before 30 days and require a face to face appointment.     PAPERWORK REQUESTS: If you have any forms or letters that need to be completed by your doctor, please present these at the beginning of the appointment to ensure that information needed to complete them is obtained during the office visit. Paperwork will be returned within 7-10 business days. Staff will call you to  the paperwork when completed.    SPECIAL EVALUATIONS: Please note that our  "department is treatment-focused. As such, we focus on treatment-oriented evaluations and do not perform specialty or "forensic" evaluations. Examples are listed below.    Disability: We do not do disability evaluations.  Please contact Social Security Administration for evaluations and determinations. You will then sign releases allowing for records from your treatment providers to be forwarded to Social Security Administration to use in their evaluation.  Gun Permit: We do not offer Sound Judgment Evaluations or assessments leading to gun ownership, nor do we fill out or file paperwork relevant to owning, concealing or purchasing a firearm.  Emotional Support     Animals (LUCIO): We do not provide documentation, including letters, to aid in the acclamation that an Emotional Support Animal is required. Note that ESAs are not trained to perform tasks or recognize particular signs or symptoms. Rather, they are distinguished by the close, emotional, and supportive bond between the animal and the owner.       SAMPLES: We do not provide samples of any medications. If you have financial difficulties and are on a limited income, you may qualify for Patient Assistance Programs from various pharmaceutical companies. This will require that you complete paperwork with your financial information, but this does not guarantee that the company will approve the application. Alternative medication options can be discussed.    REFERRALS/COORDINATION: You will be referred to other providers if we feel unable to adequately diagnose or treat your particular condition, or if collaboration with another provider would allow for better management of your condition.     Call In if problems  Call Report Side Effects   Encouraged to follow up with primary care / Gen Med MD for continued monitoring of general health and wellness  Call 911 Or go to ER if Acute Concerns (especially if any thoughts of harm to self or other)          Mariam Young, " PhD, MP  Advanced Practice Medical Psychologist  [facility]  [address]  [place] [id number] [phone]   [phone] fax

## 2022-05-06 ENCOUNTER — TELEPHONE (OUTPATIENT)
Dept: PAIN MEDICINE | Facility: CLINIC | Age: 52
End: 2022-05-06
Payer: MEDICAID

## 2022-05-07 ENCOUNTER — PATIENT OUTREACH (OUTPATIENT)
Dept: ADMINISTRATIVE | Facility: OTHER | Age: 52
End: 2022-05-07
Payer: MEDICAID

## 2022-05-07 NOTE — PROGRESS NOTES
Health Maintenance Due   Topic Date Due    COVID-19 Vaccine (3 - Booster for Moderna series) 09/26/2021     Updates were requested from care everywhere.  Chart was reviewed for overdue Proactive [facility] Encounters (RAJINDER) topics (CRS, Breast Cancer Screening, Eye exam)  Health Maintenance has been updated.  LINKS immunization registry triggered.  Immunizations were reconciled.

## 2022-05-09 ENCOUNTER — OFFICE VISIT (OUTPATIENT)
Dept: PAIN MEDICINE | Facility: CLINIC | Age: 52
End: 2022-05-09
Payer: MEDICAID

## 2022-05-09 DIAGNOSIS — F41.1 GENERALIZED ANXIETY DISORDER: Chronic | ICD-10-CM

## 2022-05-09 DIAGNOSIS — M79.18 MYOFASCIAL MUSCLE PAIN: ICD-10-CM

## 2022-05-09 DIAGNOSIS — M79.7 FIBROMYALGIA: Primary | ICD-10-CM

## 2022-05-09 PROCEDURE — 3072F LOW RISK FOR RETINOPATHY: CPT | Mod: CPTII,95,, | Performed by: PHYSICAL MEDICINE & REHABILITATION

## 2022-05-09 PROCEDURE — 3066F PR DOCUMENTATION OF TREATMENT FOR NEPHROPATHY: ICD-10-PCS | Mod: CPTII,95,, | Performed by: PHYSICAL MEDICINE & REHABILITATION

## 2022-05-09 PROCEDURE — 99214 PR OFFICE/OUTPT VISIT, EST, LEVL IV, 30-39 MIN: ICD-10-PCS | Mod: 95,,, | Performed by: PHYSICAL MEDICINE & REHABILITATION

## 2022-05-09 PROCEDURE — 3044F PR MOST RECENT HEMOGLOBIN A1C LEVEL <7.0%: ICD-10-PCS | Mod: CPTII,95,, | Performed by: PHYSICAL MEDICINE & REHABILITATION

## 2022-05-09 PROCEDURE — 3061F PR NEG MICROALBUMINURIA RESULT DOCUMENTED/REVIEW: ICD-10-PCS | Mod: CPTII,95,, | Performed by: PHYSICAL MEDICINE & REHABILITATION

## 2022-05-09 PROCEDURE — 3066F NEPHROPATHY DOC TX: CPT | Mod: CPTII,95,, | Performed by: PHYSICAL MEDICINE & REHABILITATION

## 2022-05-09 PROCEDURE — 1160F RVW MEDS BY RX/DR IN RCRD: CPT | Mod: CPTII,95,, | Performed by: PHYSICAL MEDICINE & REHABILITATION

## 2022-05-09 PROCEDURE — 3044F HG A1C LEVEL LT 7.0%: CPT | Mod: CPTII,95,, | Performed by: PHYSICAL MEDICINE & REHABILITATION

## 2022-05-09 PROCEDURE — 3072F PR LOW RISK FOR RETINOPATHY: ICD-10-PCS | Mod: CPTII,95,, | Performed by: PHYSICAL MEDICINE & REHABILITATION

## 2022-05-09 PROCEDURE — 3061F NEG MICROALBUMINURIA REV: CPT | Mod: CPTII,95,, | Performed by: PHYSICAL MEDICINE & REHABILITATION

## 2022-05-09 PROCEDURE — 1159F PR MEDICATION LIST DOCUMENTED IN MEDICAL RECORD: ICD-10-PCS | Mod: CPTII,95,, | Performed by: PHYSICAL MEDICINE & REHABILITATION

## 2022-05-09 PROCEDURE — 1159F MED LIST DOCD IN RCRD: CPT | Mod: CPTII,95,, | Performed by: PHYSICAL MEDICINE & REHABILITATION

## 2022-05-09 PROCEDURE — 1160F PR REVIEW ALL MEDS BY PRESCRIBER/CLIN PHARMACIST DOCUMENTED: ICD-10-PCS | Mod: CPTII,95,, | Performed by: PHYSICAL MEDICINE & REHABILITATION

## 2022-05-09 PROCEDURE — 99214 OFFICE O/P EST MOD 30 MIN: CPT | Mod: 95,,, | Performed by: PHYSICAL MEDICINE & REHABILITATION

## 2022-05-09 RX ORDER — DULOXETIN HYDROCHLORIDE 30 MG/1
30 CAPSULE, DELAYED RELEASE ORAL 2 TIMES DAILY
Qty: 60 CAPSULE | Refills: 2 | Status: SHIPPED | OUTPATIENT
Start: 2022-05-09 | End: 2022-06-28

## 2022-05-09 RX ORDER — TIZANIDINE 4 MG/1
4 TABLET ORAL EVERY 8 HOURS PRN
Qty: 90 TABLET | Refills: 2 | Status: SHIPPED | OUTPATIENT
Start: 2022-05-09 | End: 2022-08-07

## 2022-05-09 NOTE — PROGRESS NOTES
Established chronic pain patient tele medicine visit note (follow-up visit):    The patient location is: home  The chief complaint leading to consultation is: low back pain, leg pain    Visit type: audiovisual    Face to Face time with patient: 10-15 minutes  20 minutes of total time spent on the encounter, which includes face to face time and non-face to face time preparing to see the patient (eg, review of tests), Obtaining and/or reviewing separately obtained history, Documenting clinical information in the electronic or other health record, Independently interpreting results (not separately reported) and communicating results to the patient/family/caregiver, or Care coordination (not separately reported).     Each patient to whom he or she provides medical services by telemedicine is:  (1) informed of the relationship between the physician and patient and the respective role of any other health care provider with respect to management of the patient; and (2) notified that he or she may decline to receive medical services by telemedicine and may withdraw from such care at any time.        Chief Pain Complaint:  Fibromyalgia      Yolanda Betts is a 51 y.o. female presents today for tele medicine follow-up.  She was recently seen on 03/07/2022.  At that visit, she was provided with a Medrol Dosepak and advised to continue with topical analgesics along with Savella, Cymbalta, and Flexeril p.r.n..  She reports less than 10% relief with this current medication regimen and is having poor sleep.    Interval History (3/7/2022):    Yolanda Betts presents today for follow-up visit.  Patient was last seen about 6 months ago.  She doesn't feel pain is controlled currently, but she does not feel like she can go up on the Savella because she is concerned about drowsiness.  She continues to take the Flexeril twice a day.  She alternates with Voltaren gel and lidocaine cream, which helps somewhat.  She continues to  follow-up with orthopedics and had a right shoulder injection on 02/07/2022 with some pain relief.  Patient reports pain as 10/10 today.    Interval History (9/17/2021):  Yolanda Betts presents today on telemedicine visit.  Patient was last seen on 03/23/2021 with Dr. Hay. At that visit, she was on Cymbalta 60 mg twice a day, which she is now only taking once a day.  She is also taking Savella, and she inquires about an increase.  Patient reports pain as 8/10 today.    History of Present Illness:   This patient is a 50 y.o. female who presents today complaining of the above noted pain/s. The patient describes the pain as follows.  Ms. Betts his new patient clinic with complaints of bilateral shoulders, bilateral hips, thoracic and lumbar spine pain.  She has a history of fibromyalgia reports that this pain feels different from the fibromyalgia.  She has been diagnosed with fibromyalgiafor approximately 10 years has been having these joint complaints for approximately last 5 years.  She describes a sharp throbbing sensation which is worse with movement and she has been unable find anything that provides pain relief.  She has tried numerous different medications including Aleve, Tylenol, Robaxin, tizanidine, Advil, gabapentin, Flexeril, Lyrica, Cymbalta, baclofen in addition to currently participating in physical therapy.  She has had shoulder joint injections in the past which helped for short period of time but denies having had hip injections.  She denies having had surgery on any of these joints.  She reports that anti-inflammatory medications cause her blood pressure to increase therefore she has to avoid them.    Yolanda Betts is a 51 y.o. female  who is presenting with a chief complaint of lumbar back pain . The patient began experiencing this problem insidiously, and the pain has been gradually worsening over the past 5 year(s). The pain is described as throbbing, cramping, aching and heavy and  is located in the bilateral lumbar spine. Pain is intermittent and lasts hours. The  pain is nonradiating. The patient rates her pain a 7 out of ten and interferes with activities of daily living a 6 out of ten. Pain is exacerbated by flexion/ extension of the lumbar spine, and is improved by rest. Patient reports prior trauma, no prior spinal surgery     - pertinent negatives: No fever, No chills, No weight loss, No bladder dysfunction, No bowel dysfunction, No saddle anesthesia  - pertinent positives: none    - medications, other therapies tried (physical therapy, injections):     >> NSAIDs, Tylenol, Tramadol, gabapentin, lyrica, zanaflex and flexeril    >> Has previously undergone Physical Therapy    >> Has NOT previously undergone spinal injection/s      Imaging / Labs / Studies (reviewed on 5/9/2022):    11/13/2020 X-Ray Lumbar Spine Ap And Lateral  COMPARISON:  Lumbar spine radiographs August 18, 2020    FINDINGS:  Alignment of the lumbar spine is normal without listhesis.  No fracture.  No definite pars defect.  No suspicious osseous lesion.  Intervertebral disc spaces of the lumbar spine are maintained.  Inferior lumbar spine facet arthropathy is noted.  Sacroiliac joints are unremarkable.  Multiple phleboliths are present within the pelvis.      1/14/21 X-Ray Cervical Spine Complete 5 view  COMPARISON:  August 18, 2020    FINDINGS:  Visualization of C7-T1 level on the lateral view.  Vertebral height and alignment maintained with straightening of the normal curvature.  This can be seen with positioning as well as spasm and/or strain.  Prevertebral soft tissues, C1-2 articulation and odontoid normal in appearance allowing for positioning.  Pre dens space normal.  Neural foramina at widely patent at all levels bilaterally.    Remaining included osseous structures intact.  Visualized upper lung fields clear.    Impression  Straightening of normal C-spine curvature.  This can be seen with positioning as well as  spasm and/or strain.    No acute fracture or subluxation.  Follow-up and or further evaluation as warranted.        Review of Systems:  CONSTITUTIONAL: patient denies any fever, chills, or weight loss  SKIN: patient denies any rash or itching  RESPIRATORY: patient denies having any shortness of breath  GASTROINTESTINAL: patient denies having any diarrhea, constipation, or bowel incontinence  GENITOURINARY: patient denies having any abnormal bladder function    MUSCULOSKELETAL:  - patient complains of the above noted pain/s (see chief pain complaint)    NEUROLOGICAL:   - pain as above  - strength in Lower extremities is intact, BILATERALLY  - sensation in Lower extremities is intact, BILATERALLY  - patient denies any loss of bowel or bladder control      PSYCHIATRIC: patient denies any change in mood    Other:  All other systems reviewed and are negative      Physical Exam:  Telemedicine Exam  There were no vitals filed for this visit.  There is no height or weight on file to calculate BMI.   (reviewed on 5/9/2022)     GENERAL: Well appearing, in no acute distress, alert and oriented x3.  Cooperative. Obese.   PSYCH:  Mood and affect appropriate.  SKIN: Skin color & texture normal. No visible rashes or lesions.  HEAD/FACE:  Normocephalic, atraumatic.    PULM:  No difficulty breathing. No nasal flaring. No obvious wheezing.  BACK: Limited ROM secondary to pain reproduction.    EXTREMITIES: No deformities, edema, or skin discoloration. Moving all extremities well, symmetric strength throughout.  MUSCULOSKELETAL: No obvious atrophy abnormalities are noted.   NEURO:  UNable to toe walk and heel walk without difficulty. No obvious neurologic deficit.   GAIT: normal.     Physical Exam: last in clinic visit:  General: Alert and oriented, in no apparent distress.  Gait: normal gait.  Skin: No rashes, No discoloration, No obvious lesions  HEENT: Normocephalic, atraumatic. Pupils equal and round.  Cardiovascular: Regular rate  and rhythm , no significant peripheral edema present  Respiratory: Without audible wheezing, without use of accessory muscles of respiration.    Musculoskeletal:    Cervical Spine    - Pain on flexion of cervical spine Absent  - Spurling's Test:  Absent    - Pain on extension of cervical spine Present  - TTP over the cervical facet joints Present  - Cervical facet loading Present    -TTP over trapezius  -TTP over rhomboid      Lumbar Spine    - Pain on flexion of lumbar spine Absent  - Straight Leg Raise:  Absent    - Pain on extension of lumbar spine Present  - TTP over the lumbar facet joints Present  - Lumbar facet loading Present    -Pain on palpation over the SI joint  Present  - LONG: Absent      Neuro:    Strength:  UE R/L: D: 5/5; B: 5/5; T: 5/5; WF: 5/5; WE: 5/5; IO: 5/5;  LE R/L: HF: 5/5, HE: 5/5, KF: 5/5; KE: 5/5; FE: 5/5; FF: 5/5    Extremity Reflexes: Brisk and symmetric throughout.      Extremity Sensory: Sensation to pinprick and temperature symmetric. Proprioception intact.      Psych:  Mood and affect is appropriate      Assessment:  Yolanda Betts is a 51 y.o. year old female who is presenting with       ICD-10-CM ICD-9-CM    1. Fibromyalgia  M79.7 729.1 DULoxetine (CYMBALTA) 30 MG capsule      tiZANidine (ZANAFLEX) 4 MG tablet   2. Generalized anxiety disorder  F41.1 300.02 DULoxetine (CYMBALTA) 30 MG capsule      tiZANidine (ZANAFLEX) 4 MG tablet   3. Myofascial muscle pain  M79.18 729.1 DULoxetine (CYMBALTA) 30 MG capsule      tiZANidine (ZANAFLEX) 4 MG tablet     Plan:  - Interventional: None for now.    - Pharmacologic:    -Will discontinue Savella in place on Cymbalta 30 mg b.i.d.  -Will change muscle relaxant from Flexeril to tizanidine 4 mg Q 8 p.r.n.  - continue diclofenac 1% gel to apply 2g topically up to 4 times per day PRN.   - continue lidocaine 2.5%-prilocaine 2.5% topical cream Q6H PRN topically; can alternate with Voltaren gel, which helps some.  - Patient has failed  Gabapentin, Lyrica.  No NSAIDs due to reports of worsening hypertension.        - Rehabilitative: Encouraged regular exercise. Continue exercises and activities as tolerated.     - Diagnostic: Cervical, Thoracic, Lumbar Xray previously reviewed.     - Follow up:  10 weeks or as needed    - This condition does not require this patient to take time off of work, and the primary goal of our Pain Management services is to improve the patient's functional capacity.     - I discussed the risks, benefits, and alternatives to potential treatment options. All questions and concerns were fully addressed today in clinic.       Otto Phillips MD  Interventional Pain Medicine  [facility] [place]      Disclaimer:  This note was prepared using voice recognition system and is likely to have sound alike errors that may have been overlooked even after proof reading.  Please call me with any questions.

## 2022-05-18 ENCOUNTER — OFFICE VISIT (OUTPATIENT)
Dept: PSYCHIATRY | Facility: CLINIC | Age: 52
End: 2022-05-18
Payer: MEDICAID

## 2022-05-18 DIAGNOSIS — F31.9 BIPOLAR I DISORDER, CURRENT EPISODE DEPRESSED: Primary | ICD-10-CM

## 2022-05-18 DIAGNOSIS — F41.1 GENERALIZED ANXIETY DISORDER: ICD-10-CM

## 2022-05-18 PROCEDURE — 3061F NEG MICROALBUMINURIA REV: CPT | Mod: AJ,HB,CPTII,95 | Performed by: SOCIAL WORKER

## 2022-05-18 PROCEDURE — 3066F PR DOCUMENTATION OF TREATMENT FOR NEPHROPATHY: ICD-10-PCS | Mod: AJ,HB,CPTII,95 | Performed by: SOCIAL WORKER

## 2022-05-18 PROCEDURE — 90832 PR PSYCHOTHERAPY W/PATIENT, 30 MIN: ICD-10-PCS | Mod: AJ,HB,95, | Performed by: SOCIAL WORKER

## 2022-05-18 PROCEDURE — 3044F PR MOST RECENT HEMOGLOBIN A1C LEVEL <7.0%: ICD-10-PCS | Mod: AJ,HB,CPTII,95 | Performed by: SOCIAL WORKER

## 2022-05-18 PROCEDURE — 3072F LOW RISK FOR RETINOPATHY: CPT | Mod: AJ,HB,CPTII,95 | Performed by: SOCIAL WORKER

## 2022-05-18 PROCEDURE — 3072F PR LOW RISK FOR RETINOPATHY: ICD-10-PCS | Mod: AJ,HB,CPTII,95 | Performed by: SOCIAL WORKER

## 2022-05-18 PROCEDURE — 3044F HG A1C LEVEL LT 7.0%: CPT | Mod: AJ,HB,CPTII,95 | Performed by: SOCIAL WORKER

## 2022-05-18 PROCEDURE — 90832 PSYTX W PT 30 MINUTES: CPT | Mod: AJ,HB,95, | Performed by: SOCIAL WORKER

## 2022-05-18 PROCEDURE — 3061F PR NEG MICROALBUMINURIA RESULT DOCUMENTED/REVIEW: ICD-10-PCS | Mod: AJ,HB,CPTII,95 | Performed by: SOCIAL WORKER

## 2022-05-18 PROCEDURE — 3066F NEPHROPATHY DOC TX: CPT | Mod: AJ,HB,CPTII,95 | Performed by: SOCIAL WORKER

## 2022-05-18 NOTE — PROGRESS NOTES
Individual Psychotherapy (PhD/LCSW)    5/18/2022    Site:  Telemed         Each patient to whom she provides medical services by telemedicine is:  (1) informed of the relationship between the physician and patient and the respective role of any other health care provider with respect to management of the patient; and (2) notified that he or she may decline to receive medical services by telemedicine and may withdraw from such care at any time.     Virtual visit with synchronous audio and video    Pt calling from her car which was parked outside of her work in [place], [place]    Therapeutic Intervention: Met with patient.  Outpatient - Supportive psychotherapy 30 min - CPT Code 49432    Chief complaint/reason for encounter: depression and anxiety     Interval history and content of current session: Pt has not been to therapy in a couple of months.  She found a house, purchased it and moved in since our last session.  Getting out of the house with her significant other and all his chaos has been very helpful to her mental health.  She feels that relationship is over but he has not had the time to meet with her for her to officially let him know.  She is looking at dating sights again and considering dating again.  Her niece and a friend have moved in as room mates which is helping patient financially as well.  When patient went to get her stuff out of storage rats had eaten her bed and sofa so she is trying to save money to purchase a new sofa.  She did purchase a new bed.  Pt feel there is starting to be some healing in her relationship with her sister.  She has just learned to back off and give her sister a lot of space.  Pt considering becoming a  so will going to the informational sessions soon.  Pt turned 52 yesterday and several family members celebrated with her and they are celebrating more this weekend.  Got in some trouble at work for getting in an argument with her co worker.  Pt hoping to move  locations soon as they are understaffed where she is and the personalities are challenging for her.  She is also growing weary of the commute to [place] every day.  Boss aware that she wants to be moved but he is too understaffed at this time to be able to move her.      Treatment plan:  · Target symptoms: depression, anxiety , work stress  · Why chosen therapy is appropriate versus another modality: relevant to diagnosis, patient responds to this modality  · Outcome monitoring methods: self-report, observation  · Therapeutic intervention type: supportive psychotherapy    Risk parameters:  Patient reports no suicidal ideation  Patient reports no homicidal ideation  Patient reports no self-injurious behavior  Patient reports no violent behavior    Verbal deficits: None    Patient's response to intervention:  The patient's response to intervention is accepting.    Progress toward goals and other mental status changes:  The patient's progress toward goals is good.    Diagnosis:     ICD-10-CM ICD-9-CM   1. Bipolar I disorder, current episode depressed  F31.9 296.50   2. Generalized anxiety disorder  F41.1 300.02       Plan:  individual psychotherapy    Return to clinic: as needed    Length of Service (minutes): 30     Liv Stout   05/18/2022   2:44 PM

## 2022-05-22 ENCOUNTER — NURSE TRIAGE (OUTPATIENT)
Dept: ADMINISTRATIVE | Facility: CLINIC | Age: 52
End: 2022-05-22
Payer: MEDICAID

## 2022-05-22 NOTE — TELEPHONE ENCOUNTER
"Yolanda calling on call nurse states recent change in medications and causing her to have crying spells and anger issues. States she cancelled birthday party today. Began taking Cymbalta 1.5 to 2 weeks ago. Symptoms started 2 days ago with recent episodes of "telling people off." Yolanda reports she did not have these symptoms with previously prescribed medication. Yolanda is calm and cooperative during triage call. Denies SI/HI. Advised pt per triage protocol to go to nearest ED now for physician eval. V/u.     Reason for Disposition   Depression is main problem or symptom (e.g., feelings of sadness or hopelessness)   Very strange or confused behavior    Additional Information   Negative: SEVERE difficulty breathing (e.g., struggling for each breath, speaks in single words)   Negative: Bluish (or gray) lips or face now   Negative: Difficult to awaken or acting confused (e.g., disoriented, slurred speech)   Negative: Hysterical or combative behavior   Negative: Sounds like a life-threatening emergency to the triager   Negative: Chest pain   Negative: Palpitations, skipped heart beat, or rapid heart beat   Negative: Cough is main symptom   Negative: Suicide thoughts, threats, attempts, or questions   Negative: Patient attempted suicide   Negative: Patient is threatening suicide now   Negative: Violent behavior, or threatening to physically hurt or kill someone   Negative: [1] Patient is very confused (disoriented, slurred speech) AND [2] no other adult (e.g., friend or family member) available   Negative: [1] Difficult to awaken or acting very confused (disoriented, slurred speech) AND [2] new-onset   Negative: [1] Depression AND [2] unable to do any of normal activities (e.g., self care, school, work; in comparison to baseline).    Protocols used: ANXIETY AND PANIC ATTACK-A-AH, DEPRESSION-A-AH      "

## 2022-05-31 ENCOUNTER — PATIENT MESSAGE (OUTPATIENT)
Dept: PSYCHIATRY | Facility: CLINIC | Age: 52
End: 2022-05-31
Payer: MEDICAID

## 2022-06-10 NOTE — TELEPHONE ENCOUNTER
----- Message from Angie Nickerson sent at 6/9/2022  4:27 PM CDT -----  Contact: [facility]   Requesting an RX refill or new RX.  Is this a refill or new RX: Refill  RX name and strength :fenofibrate (TRICOR) 54 MG tablet  Is this a 30 day or 90 day RX: 30  Pharmacy name and phone #     [facility] [place] - [address]   [address] [facility]  [place] [address]  Phone: [phone] Fax: [phone]      The doctors have asked that we provide their patients with the following 2 reminders -- prescription refills can take up to 72 hours, and a friendly reminder that in the future you can use your [facility] account to request refills: yes

## 2022-06-11 RX ORDER — FENOFIBRATE 54 MG/1
54 TABLET ORAL DAILY
Qty: 90 TABLET | Refills: 1 | Status: SHIPPED | OUTPATIENT
Start: 2022-06-11 | End: 2022-06-27 | Stop reason: ALTCHOICE

## 2022-06-15 ENCOUNTER — TELEPHONE (OUTPATIENT)
Dept: GASTROENTEROLOGY | Facility: CLINIC | Age: 52
End: 2022-06-15
Payer: MEDICAID

## 2022-06-15 NOTE — TELEPHONE ENCOUNTER
----- Message from Sierra Hudson sent at 6/15/2022  2:00 PM CDT -----  Contact: Yolanda tyra [phone]  1MEDICALADVICE     Patient is calling for Medical Advice regarding:    How long has patient had these symptoms:    Pharmacy name and phone#:    Would like response via [employer]:  call back  Comments:Pt is requesting a call back from the nurse because she would like to get an appt         
Returned call to pt who stated she is still having diarrhea and abdominal pain. Pt requested an appt with Dr Montes De Oca in [place]. Appt scheduled for 6/21/22  
no

## 2022-06-17 DIAGNOSIS — E11.59 HYPERTENSION COMPLICATING DIABETES: Chronic | ICD-10-CM

## 2022-06-17 DIAGNOSIS — I15.2 HYPERTENSION COMPLICATING DIABETES: Chronic | ICD-10-CM

## 2022-06-17 RX ORDER — AMLODIPINE BESYLATE 5 MG/1
5 TABLET ORAL NIGHTLY
Qty: 30 TABLET | Refills: 11 | Status: SHIPPED | OUTPATIENT
Start: 2022-06-17 | End: 2022-06-27

## 2022-06-21 ENCOUNTER — PATIENT MESSAGE (OUTPATIENT)
Dept: PSYCHIATRY | Facility: CLINIC | Age: 52
End: 2022-06-21
Payer: MEDICAID

## 2022-06-21 ENCOUNTER — HOSPITAL ENCOUNTER (OUTPATIENT)
Dept: RADIOLOGY | Facility: HOSPITAL | Age: 52
Discharge: HOME | End: 2022-06-21
Attending: INTERNAL MEDICINE
Payer: MEDICAID

## 2022-06-21 ENCOUNTER — OFFICE VISIT (OUTPATIENT)
Dept: GASTROENTEROLOGY | Facility: CLINIC | Age: 52
End: 2022-06-21
Payer: MEDICAID

## 2022-06-21 VITALS
BODY MASS INDEX: 41.95 KG/M2 | OXYGEN SATURATION: 97 % | HEART RATE: 102 BPM | SYSTOLIC BLOOD PRESSURE: 158 MMHG | DIASTOLIC BLOOD PRESSURE: 86 MMHG | HEIGHT: 56 IN | WEIGHT: 186.5 LBS

## 2022-06-21 DIAGNOSIS — K76.0 NAFLD (NONALCOHOLIC FATTY LIVER DISEASE): Chronic | ICD-10-CM

## 2022-06-21 DIAGNOSIS — K59.04 CHRONIC IDIOPATHIC CONSTIPATION: ICD-10-CM

## 2022-06-21 DIAGNOSIS — E66.01 MORBID OBESITY WITH BMI OF 40.0-44.9, ADULT: ICD-10-CM

## 2022-06-21 DIAGNOSIS — K59.04 CHRONIC IDIOPATHIC CONSTIPATION: Primary | ICD-10-CM

## 2022-06-21 PROCEDURE — 4010F ACE/ARB THERAPY RXD/TAKEN: CPT | Mod: CPTII,,, | Performed by: INTERNAL MEDICINE

## 2022-06-21 PROCEDURE — 3079F DIAST BP 80-89 MM HG: CPT | Mod: CPTII,,, | Performed by: INTERNAL MEDICINE

## 2022-06-21 PROCEDURE — 3072F PR LOW RISK FOR RETINOPATHY: ICD-10-PCS | Mod: CPTII,,, | Performed by: INTERNAL MEDICINE

## 2022-06-21 PROCEDURE — 1160F PR REVIEW ALL MEDS BY PRESCRIBER/CLIN PHARMACIST DOCUMENTED: ICD-10-PCS | Mod: CPTII,,, | Performed by: INTERNAL MEDICINE

## 2022-06-21 PROCEDURE — 4010F PR ACE/ARB THEARPY RXD/TAKEN: ICD-10-PCS | Mod: CPTII,,, | Performed by: INTERNAL MEDICINE

## 2022-06-21 PROCEDURE — 3066F NEPHROPATHY DOC TX: CPT | Mod: CPTII,,, | Performed by: INTERNAL MEDICINE

## 2022-06-21 PROCEDURE — 3066F PR DOCUMENTATION OF TREATMENT FOR NEPHROPATHY: ICD-10-PCS | Mod: CPTII,,, | Performed by: INTERNAL MEDICINE

## 2022-06-21 PROCEDURE — 3044F HG A1C LEVEL LT 7.0%: CPT | Mod: CPTII,,, | Performed by: INTERNAL MEDICINE

## 2022-06-21 PROCEDURE — 3077F SYST BP >= 140 MM HG: CPT | Mod: CPTII,,, | Performed by: INTERNAL MEDICINE

## 2022-06-21 PROCEDURE — 3008F PR BODY MASS INDEX (BMI) DOCUMENTED: ICD-10-PCS | Mod: CPTII,,, | Performed by: INTERNAL MEDICINE

## 2022-06-21 PROCEDURE — 99214 OFFICE O/P EST MOD 30 MIN: CPT | Mod: S$PBB,,, | Performed by: INTERNAL MEDICINE

## 2022-06-21 PROCEDURE — 1159F PR MEDICATION LIST DOCUMENTED IN MEDICAL RECORD: ICD-10-PCS | Mod: CPTII,,, | Performed by: INTERNAL MEDICINE

## 2022-06-21 PROCEDURE — 3044F PR MOST RECENT HEMOGLOBIN A1C LEVEL <7.0%: ICD-10-PCS | Mod: CPTII,,, | Performed by: INTERNAL MEDICINE

## 2022-06-21 PROCEDURE — 74018 RADEX ABDOMEN 1 VIEW: CPT | Mod: 26,,, | Performed by: RADIOLOGY

## 2022-06-21 PROCEDURE — 99999 PR PBB SHADOW E&M-EST. PATIENT-LVL III: ICD-10-PCS | Mod: PBBFAC,,, | Performed by: INTERNAL MEDICINE

## 2022-06-21 PROCEDURE — 3077F PR MOST RECENT SYSTOLIC BLOOD PRESSURE >= 140 MM HG: ICD-10-PCS | Mod: CPTII,,, | Performed by: INTERNAL MEDICINE

## 2022-06-21 PROCEDURE — 1160F RVW MEDS BY RX/DR IN RCRD: CPT | Mod: CPTII,,, | Performed by: INTERNAL MEDICINE

## 2022-06-21 PROCEDURE — 99999 PR PBB SHADOW E&M-EST. PATIENT-LVL III: CPT | Mod: PBBFAC,,, | Performed by: INTERNAL MEDICINE

## 2022-06-21 PROCEDURE — 3061F PR NEG MICROALBUMINURIA RESULT DOCUMENTED/REVIEW: ICD-10-PCS | Mod: CPTII,,, | Performed by: INTERNAL MEDICINE

## 2022-06-21 PROCEDURE — 3072F LOW RISK FOR RETINOPATHY: CPT | Mod: CPTII,,, | Performed by: INTERNAL MEDICINE

## 2022-06-21 PROCEDURE — 3061F NEG MICROALBUMINURIA REV: CPT | Mod: CPTII,,, | Performed by: INTERNAL MEDICINE

## 2022-06-21 PROCEDURE — 99213 OFFICE O/P EST LOW 20 MIN: CPT | Mod: PBBFAC,PO | Performed by: INTERNAL MEDICINE

## 2022-06-21 PROCEDURE — 1159F MED LIST DOCD IN RCRD: CPT | Mod: CPTII,,, | Performed by: INTERNAL MEDICINE

## 2022-06-21 PROCEDURE — 99214 PR OFFICE/OUTPT VISIT, EST, LEVL IV, 30-39 MIN: ICD-10-PCS | Mod: S$PBB,,, | Performed by: INTERNAL MEDICINE

## 2022-06-21 PROCEDURE — 3079F PR MOST RECENT DIASTOLIC BLOOD PRESSURE 80-89 MM HG: ICD-10-PCS | Mod: CPTII,,, | Performed by: INTERNAL MEDICINE

## 2022-06-21 PROCEDURE — 3008F BODY MASS INDEX DOCD: CPT | Mod: CPTII,,, | Performed by: INTERNAL MEDICINE

## 2022-06-21 PROCEDURE — 74018 RADEX ABDOMEN 1 VIEW: CPT | Mod: TC,FY,PO

## 2022-06-21 PROCEDURE — 74018 XR ABDOMEN AP 1 VIEW: ICD-10-PCS | Mod: 26,,, | Performed by: RADIOLOGY

## 2022-06-21 RX ORDER — AMLODIPINE BESYLATE 10 MG/1
10 TABLET ORAL DAILY
COMMUNITY
End: 2022-06-27

## 2022-06-21 NOTE — ASSESSMENT & PLAN NOTE
Plan:   -Abdominal xray   -Gas x as needed   -Further recommendations after abdominal xray (may need to change medications for constipation)

## 2022-06-21 NOTE — PROGRESS NOTES
"Clinic Progress Note:  [facility] Progress Note    Reason for Visit:  The primary encounter diagnosis was Chronic idiopathic constipation. Diagnoses of NAFLD (nonalcoholic fatty liver disease) and Morbid obesity with BMI of 40.0-44.9, adult were also pertinent to this visit.    PCP: Sasha Sorenson       HPI:  This is a 52 y.o. female here for evaluation of constipation.   She has been followed for constipation in GI clinic.   She has been on Miralax, Colace, laxatives, enemas and suppositories.  She has also failed therapy with Lactulose, Linzess and Amitiza.   Last colonoscopy in 2018.    She has been experiencing loose stools that she suspect is secondary to constipation.  She is taking Miralax and stool softeners.   Have bowel movements 2-3 times a day   She is extremely bloated, and this is causing her to have nausea and early satiety.       ROS:  As per HPI       Allergies: Reviewed    Home Medications:   Medication List with Changes/Refills   Current Medications    ALBUTEROL (PROVENTIL/VENTOLIN HFA) 90 MCG/ACTUATION INHALER    Inhale 2 puffs into the lungs every 6 hours as needed for wheezing    AMLODIPINE (NORVASC) 10 MG TABLET    Take 10 mg by mouth once daily. 10 mg in am  5 mg in pm    AMLODIPINE (NORVASC) 5 MG TABLET    Take 1 tablet (5 mg total) by mouth every evening.    ASPIRIN (ECOTRIN) 81 MG EC TABLET    Take 81 mg by mouth once daily.    BD INSULIN SYRINGE ULTRA-FINE 1/2 ML 30 GAUGE X 1/2" SYRG        BLOOD SUGAR DIAGNOSTIC (ONETOUCH ULTRA TEST) STRP    Check blood glucose 4 times daily as directed and as needed (dispense insurance preferred brand or patient choice)    BLOOD SUGAR DIAGNOSTIC STRP    1 each by Misc.(Non-Drug; Combo Route) route 4 (four) times daily. [facility] Glucose Test Strips    BLOOD-GLUCOSE METER MISC    Use as directed    BUDESONIDE-FORMOTEROL 160-4.5 MCG (SYMBICORT) 160-4.5 MCG/ACTUATION HFAA    Inhale 2 puffs into the lungs every 12 (twelve) hours. Controller : " "Use spacer    CLONAZEPAM (KLONOPIN) 1 MG TABLET    Take 1 tablet (1 mg total) by mouth once daily.    CYCLOSPORINE 0.05 % DROP    Place 1 drop into both eyes 2 (two) times daily.    DICLOFENAC SODIUM (VOLTAREN) 1 % GEL    Apply 2 grams topically 4 (four) times daily as needed.    DULOXETINE (CYMBALTA) 30 MG CAPSULE    Take 1 capsule (30 mg total) by mouth 2 (two) times daily.    FENOFIBRATE (TRICOR) 54 MG TABLET    Take 1 tablet (54 mg total) by mouth once daily.    FISH OIL-OMEGA-3 FATTY ACIDS 300-1,000 MG CAPSULE    Take 1 capsule by mouth once daily.    FLASH GLUCOSE SCANNING READER (Power Assure AMY 2 READER) [facility]    Use as directed to check blood sugar    FROVATRIPTAN (FROVA) 2.5 MG TABLET    Take 1 tablet at onset of acute migraine. May take 1 more tablet 2 hours later if needed. (MAX 2 TABLET PER DAY. MAX 4 TABLETS PER WEEK.)    FUROSEMIDE (LASIX) 20 MG TABLET    Take 1 tablet (20 mg total) by mouth once daily.    INSULIN ASPART U-100 (NOVOLOG FLEXPEN U-100 INSULIN) 100 UNIT/ML (3 ML) INPN PEN    Inject 10 Units into the skin 3 (three) times daily before meals. Use based on sliding scale    LANCETS (ONETOUCH DELICA PLUS LANCET) 30 GAUGE MISC    Use as directed 3 times daily    LIDOCAINE-PRILOCAINE (EMLA) CREAM    Apply topically up to 4 times per day to affected area for pain relief    LURASIDONE (LATUDA) 60 MG TAB TABLET    Take 1 tablet by mouth daily with 350 calories    MAGNESIUM OXIDE (MAG-OX) 400 MG (241.3 MG MAGNESIUM) TABLET    Take 1 tablet (400 mg total) by mouth once daily.    METFORMIN (GLUCOPHAGE) 1000 MG TABLET    Take 1 tablet (1,000 mg total) by mouth 2 (two) times daily with meals.    MULTIVIT,CALC,MINS/IRON/FOLIC (DAILY MULTIPLE FOR WOMEN ORAL)    Take 1 tablet by mouth once daily.    PEN NEEDLE, DIABETIC (BD ULTRA-FINE MINI PEN NEEDLE) 31 GAUGE X 3/16" NDLE    Use 5 a day    PEN NEEDLE, DIABETIC (BD ULTRA-FINE MINI PEN NEEDLE) 31 GAUGE X 3/16" NDLE    Use one needle 5 times a day    " "SPIRONOLACTONE (ALDACTONE) 25 MG TABLET    Take 1 tablet (25 mg total) by mouth once daily.    TIZANIDINE (ZANAFLEX) 4 MG TABLET    Take 1 tablet (4 mg total) by mouth every 8 (eight) hours as needed (pain).    VALSARTAN (DIOVAN) 320 MG TABLET    Take 1 tablet (320 mg total) by mouth once daily.         Physical Exam:  Vital Signs:  BP (!) 158/86   Pulse 102   Ht 4' 8" (1.422 m)   Wt 84.6 kg (186 lb 8.2 oz)   SpO2 97%   BMI 41.81 kg/m²   Body mass index is 41.81 kg/m².    Physical Exam  Constitutional:       Appearance: Normal appearance. She is obese.   HENT:      Head: Normocephalic.      Mouth/Throat:      Mouth: Mucous membranes are moist.   Abdominal:      General: There is distension.      Palpations: Abdomen is soft.      Tenderness: There is no abdominal tenderness. There is no guarding.   Neurological:      Mental Status: She is alert.          Labs: Pertinent labs reviewed.        Assessment:  Problem List Items Addressed This Visit        Endocrine    Morbid obesity with BMI of 40.0-44.9, adult    Current Assessment & Plan     Patient counseled on weight loss               GI    NAFLD (nonalcoholic fatty liver disease) (Chronic)    Overview     CT Abdomen 01/2022  Liver: Hepatic steatosis         Current Assessment & Plan     -Patient counseled on weight loss            Chronic idiopathic constipation - Primary    Current Assessment & Plan     Plan:   -Abdominal xray   -Gas x as needed   -Further recommendations after abdominal xray (may need to change medications for constipation)               Chronic idiopathic constipation  -     X-Ray Abdomen AP 1 View; Future; Expected date: 06/21/2022    NAFLD (nonalcoholic fatty liver disease)    Morbid obesity with BMI of 40.0-44.9, adult            Order summary:  Orders Placed This Encounter   Procedures    X-Ray Abdomen AP 1 View       Thank you so much for allowing me to participate in the care of Yolanda Montes De Oca, " MD  Gastroenterology and Hepatology  [facility]

## 2022-06-22 ENCOUNTER — TELEPHONE (OUTPATIENT)
Dept: PRIMARY CARE CLINIC | Facility: CLINIC | Age: 52
End: 2022-06-22
Payer: MEDICAID

## 2022-06-22 ENCOUNTER — PATIENT MESSAGE (OUTPATIENT)
Dept: GASTROENTEROLOGY | Facility: CLINIC | Age: 52
End: 2022-06-22
Payer: MEDICAID

## 2022-06-22 ENCOUNTER — TELEPHONE (OUTPATIENT)
Dept: CARDIOLOGY | Facility: CLINIC | Age: 52
End: 2022-06-22
Payer: MEDICAID

## 2022-06-22 NOTE — TELEPHONE ENCOUNTER
Patient called regarding appt for today for elevated blood pressure/pain/headache patient was informed that the soonest appointment available would be Friday with IRASEMA Maciel.  Patient provided blood pressure reading of 150/100 with Tachycardia patient was strongly encouraged to report to ED,patient refused multiple times during call patient was scheduled on 5/4- no show and 6/2- she cancelled I inquired if she thought she could wait to be seen on Friday she replied NO, patient again was encouraged to please report to ED for Assessment and I informed that I scheduled her a follow up for Friday at 9:30 am with Raheem for medication check and blood pressure assessment. Provider was given update of basim and patient status.

## 2022-06-22 NOTE — TELEPHONE ENCOUNTER
LVM for patient to let her know which medications can be added.----- Message from Ermias Mccormick MD sent at 6/22/2022  4:23 PM CDT -----  Contact: pt  Yes they can add clonidine or doxazosin.  ----- Message -----  From: Felicia Boogie LPN  Sent: 6/22/2022   2:18 PM CDT  To: Ermias Mccormick MD    Patient at [facility] Er with c/o elevated b/p 155/100 and 158/98 say b/p has been running high for the last 2 months but worse now. She says the ER is not going to do anything and she can't keep having her b/p that high. She is on Valsartan 320 mg once daily, Lasix 20 mg once daily, spironolactone 25 mg once daily and amlodipine 10 mg in the morning and 5 mg at night. Wants to know if any changes can be made to her meds?  ----- Message -----  From: Tamara Singletary  Sent: 6/22/2022   1:17 PM CDT  To: Anny MARK Staff    155/100 - BP   This morning     158/98 - approx noon     Type:  Sooner Apoointment Request    Caller is requesting a sooner appointment.  Caller declined first available appointment listed below.  Caller will not accept being placed on the waitlist and is requesting a message be sent to doctor.  Name of Caller: pt   When is the first available appointment?  Symptoms: ER follow up /Elevated BP/ [facility]   Would the patient rather a call back or a response via [facility]? phone  Best Call Back Number:  [phone] or [phone]  Additional Information: please call back today to get the pt seen

## 2022-06-23 ENCOUNTER — TELEPHONE (OUTPATIENT)
Dept: CARDIOLOGY | Facility: CLINIC | Age: 52
End: 2022-06-23
Payer: MEDICAID

## 2022-06-23 ENCOUNTER — TELEPHONE (OUTPATIENT)
Dept: GASTROENTEROLOGY | Facility: CLINIC | Age: 52
End: 2022-06-23
Payer: MEDICAID

## 2022-06-23 NOTE — TELEPHONE ENCOUNTER
----- Message from Bette Hubbard sent at 6/23/2022  2:30 PM CDT -----  .Type:  Needs Medical Advice    Who Called: PILO YANCEY [[id number]]  Would the patient rather a call back or a response via [facility]? Call   Best Call Back Number: .[phone] (home)    Additional Information: Pt states per last appointment she was advised the provider would call her some medication in and she hasn't received anything and she is in pain.. Thanks AW

## 2022-06-23 NOTE — TELEPHONE ENCOUNTER
Spoke to patient and found out she is out of amlodipine 10 mg. Called in refills and let patient know that the medication was called in and that Dr. Mccormick said to give it a week to bring her b/p down.  ----- Message from Bette Hubbard sent at 6/23/2022  2:32 PM CDT -----  Contact: PILO YANCEY [[id number]]  .Type:  Needs Medical Advice    Who Called: PILO YANCEY [[id number]]  Would the patient rather a call back or a response via [facility]? Call   Best Call Back Number: .[phone] (home)    Additional Information: Pt is req a call back in regards to her 10 mg amLODIPine she was in the ER on yesterday and she need to speak with someone in regards to this.. Thanks AW

## 2022-06-23 NOTE — TELEPHONE ENCOUNTER
----- Message from Jennifer Knox sent at 11/10/2020  9:25 AM CST -----  Contact: PILO YANCEY [[id number]]  .Type:  Needs Medical Advice    Who Called:  PILO YANCEY [[id number]]  Symptoms (please be specific):  cough and sinus drip   How long has patient had these symptoms:    3 days   Pharmacy name and phone #:        [facility]  [address]  [place] [address]  Phone: [phone] Fax: [phone]    Would the patient rather a call back or a response via [facility]?   Call   Best Call Back Number:  [phone] (home)    Additional Information:         
Called and left voicemail for patient informing her that Dr. Beach said to get plenty of rest, hydration and ask her pharmacist for recommendations on over the counter treatment of her symptoms.   
Spoke with pt regarding her have cough and sinus drip.Pt stated she has been taking Zyrtec,saline drops and Flonase pt would like to know what else she can take .//LB  
Plan: 1. Continue to wash twice daily with a gentle cleanser\\n2. Continue Clindamycin Lotion full face in the morning\\n3. Begin Aklief - pea sized amount to full face in AM\\n4. Moisturize twice daily
Detail Level: Simple

## 2022-06-24 ENCOUNTER — OFFICE VISIT (OUTPATIENT)
Dept: PRIMARY CARE CLINIC | Facility: CLINIC | Age: 52
End: 2022-06-24
Payer: MEDICAID

## 2022-06-24 ENCOUNTER — LAB VISIT (OUTPATIENT)
Dept: LAB | Facility: HOSPITAL | Age: 52
End: 2022-06-24
Attending: FAMILY MEDICINE
Payer: MEDICAID

## 2022-06-24 ENCOUNTER — TELEPHONE (OUTPATIENT)
Dept: GASTROENTEROLOGY | Facility: CLINIC | Age: 52
End: 2022-06-24
Payer: MEDICAID

## 2022-06-24 ENCOUNTER — PATIENT MESSAGE (OUTPATIENT)
Dept: GASTROENTEROLOGY | Facility: CLINIC | Age: 52
End: 2022-06-24
Payer: MEDICAID

## 2022-06-24 VITALS
DIASTOLIC BLOOD PRESSURE: 80 MMHG | HEIGHT: 56 IN | RESPIRATION RATE: 20 BRPM | SYSTOLIC BLOOD PRESSURE: 140 MMHG | TEMPERATURE: 97 F | OXYGEN SATURATION: 98 % | HEART RATE: 96 BPM | BODY MASS INDEX: 41.84 KG/M2 | WEIGHT: 186 LBS

## 2022-06-24 DIAGNOSIS — Z12.31 BREAST CANCER SCREENING BY MAMMOGRAM: ICD-10-CM

## 2022-06-24 DIAGNOSIS — E78.1 PURE HYPERTRIGLYCERIDEMIA: ICD-10-CM

## 2022-06-24 DIAGNOSIS — Z09 HOSPITAL DISCHARGE FOLLOW-UP: ICD-10-CM

## 2022-06-24 DIAGNOSIS — G43.109 MIGRAINE WITH AURA AND WITHOUT STATUS MIGRAINOSUS, NOT INTRACTABLE: ICD-10-CM

## 2022-06-24 DIAGNOSIS — I10 ESSENTIAL HYPERTENSION: Primary | ICD-10-CM

## 2022-06-24 LAB
CHOLEST SERPL-MCNC: 187 MG/DL (ref 120–199)
CHOLEST/HDLC SERPL: 4.9 {RATIO} (ref 2–5)
HDLC SERPL-MCNC: 38 MG/DL (ref 40–75)
HDLC SERPL: 20.3 % (ref 20–50)
LDLC SERPL CALC-MCNC: 101.8 MG/DL (ref 63–159)
NONHDLC SERPL-MCNC: 149 MG/DL
TRIGL SERPL-MCNC: 236 MG/DL (ref 30–150)

## 2022-06-24 PROCEDURE — 3072F LOW RISK FOR RETINOPATHY: CPT | Mod: CPTII,,, | Performed by: NURSE PRACTITIONER

## 2022-06-24 PROCEDURE — 3066F PR DOCUMENTATION OF TREATMENT FOR NEPHROPATHY: ICD-10-PCS | Mod: CPTII,,, | Performed by: NURSE PRACTITIONER

## 2022-06-24 PROCEDURE — 3044F PR MOST RECENT HEMOGLOBIN A1C LEVEL <7.0%: ICD-10-PCS | Mod: CPTII,,, | Performed by: NURSE PRACTITIONER

## 2022-06-24 PROCEDURE — 4010F ACE/ARB THERAPY RXD/TAKEN: CPT | Mod: CPTII,,, | Performed by: NURSE PRACTITIONER

## 2022-06-24 PROCEDURE — 1160F PR REVIEW ALL MEDS BY PRESCRIBER/CLIN PHARMACIST DOCUMENTED: ICD-10-PCS | Mod: CPTII,,, | Performed by: NURSE PRACTITIONER

## 2022-06-24 PROCEDURE — 3077F SYST BP >= 140 MM HG: CPT | Mod: CPTII,,, | Performed by: NURSE PRACTITIONER

## 2022-06-24 PROCEDURE — 3077F PR MOST RECENT SYSTOLIC BLOOD PRESSURE >= 140 MM HG: ICD-10-PCS | Mod: CPTII,,, | Performed by: NURSE PRACTITIONER

## 2022-06-24 PROCEDURE — 4010F PR ACE/ARB THEARPY RXD/TAKEN: ICD-10-PCS | Mod: CPTII,,, | Performed by: NURSE PRACTITIONER

## 2022-06-24 PROCEDURE — 3008F PR BODY MASS INDEX (BMI) DOCUMENTED: ICD-10-PCS | Mod: CPTII,,, | Performed by: NURSE PRACTITIONER

## 2022-06-24 PROCEDURE — 99999 PR PBB SHADOW E&M-EST. PATIENT-LVL V: ICD-10-PCS | Mod: PBBFAC,,, | Performed by: NURSE PRACTITIONER

## 2022-06-24 PROCEDURE — 3072F PR LOW RISK FOR RETINOPATHY: ICD-10-PCS | Mod: CPTII,,, | Performed by: NURSE PRACTITIONER

## 2022-06-24 PROCEDURE — 3079F DIAST BP 80-89 MM HG: CPT | Mod: CPTII,,, | Performed by: NURSE PRACTITIONER

## 2022-06-24 PROCEDURE — 99214 OFFICE O/P EST MOD 30 MIN: CPT | Mod: S$PBB,,, | Performed by: NURSE PRACTITIONER

## 2022-06-24 PROCEDURE — 3061F NEG MICROALBUMINURIA REV: CPT | Mod: CPTII,,, | Performed by: NURSE PRACTITIONER

## 2022-06-24 PROCEDURE — 3044F HG A1C LEVEL LT 7.0%: CPT | Mod: CPTII,,, | Performed by: NURSE PRACTITIONER

## 2022-06-24 PROCEDURE — 1159F MED LIST DOCD IN RCRD: CPT | Mod: CPTII,,, | Performed by: NURSE PRACTITIONER

## 2022-06-24 PROCEDURE — 1159F PR MEDICATION LIST DOCUMENTED IN MEDICAL RECORD: ICD-10-PCS | Mod: CPTII,,, | Performed by: NURSE PRACTITIONER

## 2022-06-24 PROCEDURE — 3061F PR NEG MICROALBUMINURIA RESULT DOCUMENTED/REVIEW: ICD-10-PCS | Mod: CPTII,,, | Performed by: NURSE PRACTITIONER

## 2022-06-24 PROCEDURE — 36415 COLL VENOUS BLD VENIPUNCTURE: CPT | Mod: PN | Performed by: FAMILY MEDICINE

## 2022-06-24 PROCEDURE — 80061 LIPID PANEL: CPT | Performed by: FAMILY MEDICINE

## 2022-06-24 PROCEDURE — 3008F BODY MASS INDEX DOCD: CPT | Mod: CPTII,,, | Performed by: NURSE PRACTITIONER

## 2022-06-24 PROCEDURE — 3066F NEPHROPATHY DOC TX: CPT | Mod: CPTII,,, | Performed by: NURSE PRACTITIONER

## 2022-06-24 PROCEDURE — 3079F PR MOST RECENT DIASTOLIC BLOOD PRESSURE 80-89 MM HG: ICD-10-PCS | Mod: CPTII,,, | Performed by: NURSE PRACTITIONER

## 2022-06-24 PROCEDURE — 1160F RVW MEDS BY RX/DR IN RCRD: CPT | Mod: CPTII,,, | Performed by: NURSE PRACTITIONER

## 2022-06-24 PROCEDURE — 99215 OFFICE O/P EST HI 40 MIN: CPT | Mod: PBBFAC,PN | Performed by: NURSE PRACTITIONER

## 2022-06-24 PROCEDURE — 99999 PR PBB SHADOW E&M-EST. PATIENT-LVL V: CPT | Mod: PBBFAC,,, | Performed by: NURSE PRACTITIONER

## 2022-06-24 PROCEDURE — 99214 PR OFFICE/OUTPT VISIT, EST, LEVL IV, 30-39 MIN: ICD-10-PCS | Mod: S$PBB,,, | Performed by: NURSE PRACTITIONER

## 2022-06-24 RX ORDER — METOPROLOL SUCCINATE 50 MG/1
50 TABLET, EXTENDED RELEASE ORAL NIGHTLY
Qty: 14 TABLET | Refills: 0 | Status: SHIPPED | OUTPATIENT
Start: 2022-06-24 | End: 2022-07-01 | Stop reason: SDUPTHER

## 2022-06-24 RX ORDER — PLECANATIDE 3 MG/1
3 TABLET ORAL DAILY
Qty: 90 TABLET | Refills: 0 | Status: SHIPPED | OUTPATIENT
Start: 2022-06-24 | End: 2022-09-22

## 2022-06-24 RX ORDER — AMLODIPINE AND VALSARTAN 10; 320 MG/1; MG/1
1 TABLET ORAL DAILY
Qty: 90 TABLET | Refills: 1 | Status: SHIPPED | OUTPATIENT
Start: 2022-06-24 | End: 2022-08-26 | Stop reason: SDUPTHER

## 2022-06-24 NOTE — PROGRESS NOTES
"Subjective:      Patient ID: Yolanda Betts is a 52 y.o. female.    Chief Complaint: Establish Care (Elevated bp)    BP (!) 140/80 (BP Location: Left arm, Patient Position: Sitting, BP Method: Medium (Manual))   Pulse 96   Temp 96.8 °F (36 °C)   Resp 20   Ht 4' 8" (1.422 m)   Wt 84.4 kg (186 lb)   SpO2 98%   BMI 41.70 kg/m²     Pt presents for follow up after ER discharge for elevated blood pressure and headaches. States her BP and her pulse rate has been elevated daily. Pt is taking all her BP medication as prescribed. /102 via digital medicine this am. Pt states she was given Fioricet in the ER to help with headaches. Pt also requesting an order for her annual mammogram in July.      Review of patient's allergies indicates:   Allergen Reactions    Codeine Itching    Hydromorphone Other (See Comments)     Can't wake up for long time if taken  Slow to wake up after surgery after receiving    Lyrica [pregabalin] Itching    Neuromuscular blockers, steroidal Hives     some    Latex, natural rubber Rash    Morphine Rash     itching    Norco [hydrocodone-acetaminophen] Itching, Rash and Hallucinations    Seconal [secobarbital sodium] Rash     itching    Tylox [oxycodone-acetaminophen] Rash        Review of Systems   Constitutional: Negative for chills and fever.   HENT: Negative for congestion and sore throat.    Respiratory: Negative for cough and shortness of breath.    Cardiovascular: Negative for chest pain, palpitations and orthopnea.   Gastrointestinal: Negative for nausea and vomiting.   Genitourinary: Negative.    Skin: Negative.    Neurological: Positive for headaches. Negative for weakness.      Objective:      Physical Exam  Vitals reviewed.   Constitutional:       Appearance: She is well-developed.   HENT:      Head: Normocephalic and atraumatic.      Right Ear: External ear normal.      Left Ear: External ear normal.      Nose: No mucosal edema or rhinorrhea.      Mouth/Throat:     "  Mouth: Mucous membranes are moist.      Pharynx: Uvula midline.   Eyes:      General: Lids are normal.      Conjunctiva/sclera: Conjunctivae normal.      Pupils: Pupils are equal, round, and reactive to light.   Cardiovascular:      Rate and Rhythm: Normal rate and regular rhythm.      Heart sounds: Normal heart sounds. No murmur heard.  Pulmonary:      Effort: Pulmonary effort is normal.      Breath sounds: Normal breath sounds. No decreased breath sounds or wheezing.   Musculoskeletal:         General: Normal range of motion.      Cervical back: Normal range of motion and neck supple.   Lymphadenopathy:      Cervical: No cervical adenopathy.   Skin:     General: Skin is warm and dry.      Capillary Refill: Capillary refill takes less than 2 seconds.      Coloration: Skin is not cyanotic or pale.   Neurological:      Mental Status: She is alert and oriented to person, place, and time.      Cranial Nerves: No cranial nerve deficit.   Psychiatric:         Attention and Perception: Attention normal.         Mood and Affect: Mood normal.         Speech: Speech normal.         Behavior: Behavior normal.         Thought Content: Thought content normal.         Cognition and Memory: Cognition normal.         Assessment:       1. Essential hypertension    2. Hospital discharge follow-up    3. Breast cancer screening by mammogram    4. Migraine with aura and without status migrainosus, not intractable        Plan:     Essential hypertension  -     metoprolol succinate (TOPROL-XL) 50 MG 24 hr tablet; Take 1 tablet (50 mg total) by mouth every evening. for 14 days  Dispense: 14 tablet; Refill: 0  -     amlodipine-valsartan (EXFORGE)  mg per tablet; Take 1 tablet by mouth once daily.  Dispense: 90 tablet; Refill: 1    Hospital discharge follow-up    Breast cancer screening by mammogram  -     Mammo Digital Screening Bilat; Future; Expected date: 06/24/2022    Migraine with aura and without status migrainosus, not  intractable    MDM: Informed pt that Fioricet was ok to continue taking but in moderation. Take only when necessary due to possible side effect of rebound headaches.    Valsartan and amlodipine prescribed as a combo medication.  50 mg Metoprolol nightly added to BP regimen.    Continue taking all medication as prescribed.  Begin taking Metoprolol every evening.    · ER for BP >180/110 or for any worsening headache that doesn't improve with medication, sudden onset of vision changes, slurred speech, facial drooping, or for any weakness, numbness, or tingling of face or extremities.  · Keep a log of your blood pressures and follow up with your primary care doctor. If numbers remain elevated, they may want to see you in clinic to discuss changing/adding you your current medication regimen.   · Take your BP on the same arm each time and around the same time each day. Sit with both feet on the floor for a full 5 minutes. Your arm should be at the level of your heart when you take your pressure.   · Watch the sodium in your diet. Try to stay under 2000 mg per day. Canned foods, pre-packaged/processed foods, and food from restaurants are all usually high in sodium.  · Limit caffeine intake to one cup per day if possible.   · Avoid over the counter decongestants (decongestants have one of these listed in the active ingredients: phenylephrine, pseudoephedrine).   · Try to take 15-20 minutes every evening to do something relaxing (read a book, bath, listen to soothing music, etc). This will help with stress and BP.     Follow up in one week with Dr. Sorenson.

## 2022-06-24 NOTE — PATIENT INSTRUCTIONS
Continue taking all medication as prescribed.  Begin taking Metoprolol every evening.    ER for BP >180/110 or for any worsening headache that doesn't improve with medication, sudden onset of vision changes, slurred speech, facial drooping, or for any weakness, numbness, or tingling of face or extremities.  Keep a log of your blood pressures and follow up with your primary care doctor. If numbers remain elevated, they may want to see you in clinic to discuss changing/adding you your current medication regimen.   Take your BP on the same arm each time and around the same time each day. Sit with both feet on the floor for a full 5 minutes. Your arm should be at the level of your heart when you take your pressure.   Watch the sodium in your diet. Try to stay under 2000 mg per day. Canned foods, pre-packaged/processed foods, and food from restaurants are all usually high in sodium.  Limit caffeine intake to one cup per day if possible.   Avoid over the counter decongestants (decongestants have one of these listed in the active ingredients: phenylephrine, pseudoephedrine).   Try to take 15-20 minutes every evening to do something relaxing (read a book, bath, listen to soothing music, etc). This will help with stress and BP.

## 2022-06-27 ENCOUNTER — TELEPHONE (OUTPATIENT)
Dept: PEDIATRICS | Facility: CLINIC | Age: 52
End: 2022-06-27
Payer: MEDICAID

## 2022-06-27 VITALS — SYSTOLIC BLOOD PRESSURE: 120 MMHG | DIASTOLIC BLOOD PRESSURE: 77 MMHG

## 2022-06-27 DIAGNOSIS — E78.1 HYPERTRIGLYCERIDEMIA: Primary | ICD-10-CM

## 2022-06-27 RX ORDER — FENOFIBRATE 145 MG/1
145 TABLET, FILM COATED ORAL DAILY
Qty: 90 TABLET | Refills: 1 | Status: SHIPPED | OUTPATIENT
Start: 2022-06-27 | End: 2022-08-26 | Stop reason: SDUPTHER

## 2022-06-27 NOTE — TELEPHONE ENCOUNTER
Spoke with pt verbalized understanding of abnormal lab result and recommendation of Dr. ANDRIY Sorenson

## 2022-06-27 NOTE — TELEPHONE ENCOUNTER
----- Message from Sasha Sorenson MD sent at 6/27/2022  8:01 AM CDT -----  Abnormal lab result.  Your cholesterol is worsening  Please take your meds as ordered and adhere strictly to your diet in order to avoid complications of high cholesterol which includes heart attack, stroke, kidney failure requiring dialysis, peripheral vascular disease, loss of limb and premature death.  Also, Maintain/Continue a heathy lifestyle.  Choose a diet low in red meat and avoid all fried foods and saturated fat in sweets.  Be more active. If you are able, walk for 35 mins 5 times a week.        As always, we are here to help youi achieve your health goals but we cannot do it without your cooperation. Let us know how we can help you.

## 2022-06-28 ENCOUNTER — TELEPHONE (OUTPATIENT)
Dept: PAIN MEDICINE | Facility: CLINIC | Age: 52
End: 2022-06-28
Payer: MEDICAID

## 2022-06-28 DIAGNOSIS — M79.7 FIBROMYALGIA: ICD-10-CM

## 2022-06-28 DIAGNOSIS — M79.18 MYOFASCIAL MUSCLE PAIN: ICD-10-CM

## 2022-06-28 DIAGNOSIS — F41.1 GENERALIZED ANXIETY DISORDER: Chronic | ICD-10-CM

## 2022-06-28 RX ORDER — DULOXETIN HYDROCHLORIDE 60 MG/1
60 CAPSULE, DELAYED RELEASE ORAL DAILY
Qty: 30 CAPSULE | Refills: 2 | Status: SHIPPED | OUTPATIENT
Start: 2022-06-28 | End: 2022-09-23 | Stop reason: SDUPTHER

## 2022-06-28 NOTE — TELEPHONE ENCOUNTER
----- Message from Denise Cintron sent at 6/28/2022 10:38 AM CDT -----  Contact[facility]  Duloxetine written 30mg twice a day the insurance will only pay for 60mg once a day.  Please call Yulissa at [phone]

## 2022-06-28 NOTE — TELEPHONE ENCOUNTER
Talked to Simona with the pharmacy and she stated she gave pt a 7 day supply of Duloxetine 60mg once a day because insurance would not cover 30 mg twice a day. I informed Simona I would let Dr. Phillips know to see if he can write a new script of 60mg once a day.

## 2022-06-28 NOTE — TELEPHONE ENCOUNTER
Informed Abena at pharmacy that Dr. Phillips changed Cymbalta to 60 mg once daily. She understood.

## 2022-06-29 ENCOUNTER — TELEPHONE (OUTPATIENT)
Dept: GASTROENTEROLOGY | Facility: CLINIC | Age: 52
End: 2022-06-29
Payer: MEDICAID

## 2022-06-29 ENCOUNTER — PATIENT MESSAGE (OUTPATIENT)
Dept: GASTROENTEROLOGY | Facility: CLINIC | Age: 52
End: 2022-06-29
Payer: MEDICAID

## 2022-06-30 ENCOUNTER — OFFICE VISIT (OUTPATIENT)
Dept: CARDIOLOGY | Facility: CLINIC | Age: 52
End: 2022-06-30
Payer: MEDICAID

## 2022-06-30 VITALS
SYSTOLIC BLOOD PRESSURE: 108 MMHG | WEIGHT: 182.31 LBS | DIASTOLIC BLOOD PRESSURE: 76 MMHG | OXYGEN SATURATION: 96 % | HEART RATE: 85 BPM | BODY MASS INDEX: 40.88 KG/M2

## 2022-06-30 DIAGNOSIS — G47.11 IDIOPATHIC HYPERSOMNOLENCE: ICD-10-CM

## 2022-06-30 DIAGNOSIS — E66.01 MORBID OBESITY WITH BMI OF 40.0-44.9, ADULT: ICD-10-CM

## 2022-06-30 DIAGNOSIS — F19.982 SLEEP PROBLEM CAUSED BY DRUG: ICD-10-CM

## 2022-06-30 DIAGNOSIS — J45.909 ASTHMA, UNSPECIFIED ASTHMA SEVERITY, UNSPECIFIED WHETHER COMPLICATED, UNSPECIFIED WHETHER PERSISTENT: ICD-10-CM

## 2022-06-30 DIAGNOSIS — E11.42 DM TYPE 2 WITH DIABETIC PERIPHERAL NEUROPATHY: ICD-10-CM

## 2022-06-30 DIAGNOSIS — I15.2 HYPERTENSION COMPLICATING DIABETES: ICD-10-CM

## 2022-06-30 DIAGNOSIS — E78.5 DYSLIPIDEMIA ASSOCIATED WITH TYPE 2 DIABETES MELLITUS: Primary | ICD-10-CM

## 2022-06-30 DIAGNOSIS — E66.2 OBESITY HYPOVENTILATION SYNDROME: ICD-10-CM

## 2022-06-30 DIAGNOSIS — E11.59 HYPERTENSION COMPLICATING DIABETES: ICD-10-CM

## 2022-06-30 DIAGNOSIS — E11.69 DYSLIPIDEMIA ASSOCIATED WITH TYPE 2 DIABETES MELLITUS: Primary | ICD-10-CM

## 2022-06-30 DIAGNOSIS — E78.1 HYPERTRIGLYCERIDEMIA: ICD-10-CM

## 2022-06-30 PROCEDURE — 3061F NEG MICROALBUMINURIA REV: CPT | Mod: CPTII,,, | Performed by: INTERNAL MEDICINE

## 2022-06-30 PROCEDURE — 4010F ACE/ARB THERAPY RXD/TAKEN: CPT | Mod: CPTII,,, | Performed by: INTERNAL MEDICINE

## 2022-06-30 PROCEDURE — 3044F HG A1C LEVEL LT 7.0%: CPT | Mod: CPTII,,, | Performed by: INTERNAL MEDICINE

## 2022-06-30 PROCEDURE — 99999 PR PBB SHADOW E&M-EST. PATIENT-LVL IV: CPT | Mod: PBBFAC,,, | Performed by: INTERNAL MEDICINE

## 2022-06-30 PROCEDURE — 3008F PR BODY MASS INDEX (BMI) DOCUMENTED: ICD-10-PCS | Mod: CPTII,,, | Performed by: INTERNAL MEDICINE

## 2022-06-30 PROCEDURE — 3074F SYST BP LT 130 MM HG: CPT | Mod: CPTII,,, | Performed by: INTERNAL MEDICINE

## 2022-06-30 PROCEDURE — 99214 OFFICE O/P EST MOD 30 MIN: CPT | Mod: S$PBB,,, | Performed by: INTERNAL MEDICINE

## 2022-06-30 PROCEDURE — 99214 OFFICE O/P EST MOD 30 MIN: CPT | Mod: PBBFAC | Performed by: INTERNAL MEDICINE

## 2022-06-30 PROCEDURE — 3072F PR LOW RISK FOR RETINOPATHY: ICD-10-PCS | Mod: CPTII,,, | Performed by: INTERNAL MEDICINE

## 2022-06-30 PROCEDURE — 1159F MED LIST DOCD IN RCRD: CPT | Mod: CPTII,,, | Performed by: INTERNAL MEDICINE

## 2022-06-30 PROCEDURE — 3078F PR MOST RECENT DIASTOLIC BLOOD PRESSURE < 80 MM HG: ICD-10-PCS | Mod: CPTII,,, | Performed by: INTERNAL MEDICINE

## 2022-06-30 PROCEDURE — 3074F PR MOST RECENT SYSTOLIC BLOOD PRESSURE < 130 MM HG: ICD-10-PCS | Mod: CPTII,,, | Performed by: INTERNAL MEDICINE

## 2022-06-30 PROCEDURE — 3072F LOW RISK FOR RETINOPATHY: CPT | Mod: CPTII,,, | Performed by: INTERNAL MEDICINE

## 2022-06-30 PROCEDURE — 3078F DIAST BP <80 MM HG: CPT | Mod: CPTII,,, | Performed by: INTERNAL MEDICINE

## 2022-06-30 PROCEDURE — 3066F NEPHROPATHY DOC TX: CPT | Mod: CPTII,,, | Performed by: INTERNAL MEDICINE

## 2022-06-30 PROCEDURE — 3061F PR NEG MICROALBUMINURIA RESULT DOCUMENTED/REVIEW: ICD-10-PCS | Mod: CPTII,,, | Performed by: INTERNAL MEDICINE

## 2022-06-30 PROCEDURE — 99214 PR OFFICE/OUTPT VISIT, EST, LEVL IV, 30-39 MIN: ICD-10-PCS | Mod: S$PBB,,, | Performed by: INTERNAL MEDICINE

## 2022-06-30 PROCEDURE — 3044F PR MOST RECENT HEMOGLOBIN A1C LEVEL <7.0%: ICD-10-PCS | Mod: CPTII,,, | Performed by: INTERNAL MEDICINE

## 2022-06-30 PROCEDURE — 3066F PR DOCUMENTATION OF TREATMENT FOR NEPHROPATHY: ICD-10-PCS | Mod: CPTII,,, | Performed by: INTERNAL MEDICINE

## 2022-06-30 PROCEDURE — 3008F BODY MASS INDEX DOCD: CPT | Mod: CPTII,,, | Performed by: INTERNAL MEDICINE

## 2022-06-30 PROCEDURE — 99999 PR PBB SHADOW E&M-EST. PATIENT-LVL IV: ICD-10-PCS | Mod: PBBFAC,,, | Performed by: INTERNAL MEDICINE

## 2022-06-30 PROCEDURE — 1159F PR MEDICATION LIST DOCUMENTED IN MEDICAL RECORD: ICD-10-PCS | Mod: CPTII,,, | Performed by: INTERNAL MEDICINE

## 2022-06-30 PROCEDURE — 4010F PR ACE/ARB THEARPY RXD/TAKEN: ICD-10-PCS | Mod: CPTII,,, | Performed by: INTERNAL MEDICINE

## 2022-06-30 RX ORDER — ATORVASTATIN CALCIUM 20 MG/1
20 TABLET, FILM COATED ORAL NIGHTLY
Qty: 90 TABLET | Refills: 3 | Status: SHIPPED | OUTPATIENT
Start: 2022-06-30 | End: 2022-08-26 | Stop reason: SDUPTHER

## 2022-07-01 DIAGNOSIS — I10 ESSENTIAL HYPERTENSION: ICD-10-CM

## 2022-07-01 RX ORDER — METOPROLOL SUCCINATE 50 MG/1
50 TABLET, EXTENDED RELEASE ORAL NIGHTLY
Qty: 90 TABLET | Refills: 0 | Status: SHIPPED | OUTPATIENT
Start: 2022-07-01 | End: 2022-08-26 | Stop reason: SDUPTHER

## 2022-07-01 NOTE — PROGRESS NOTES
"Subjective:   Patient ID:  Yolanda Betts is a 52 y.o. female who presents for follow up of No chief complaint on file.      HPI  4/22/2020  A 48 yo female with htn diabetes obesity o2 desaturation nocturnal o2 therapy migraine asthma is following on htn. Her bp is improved she is more complaint with slat diabetes and meds intake. The dose adjustment has helped with the bp buy taking extra 5 mg of amlodipine.  Has uti and had shortness of breath necessitating an er visits. Feels much better. Has  Less  shortness of breath she got hydrated. Has no leg edema her nebs has helped control symptoms has no other issues clinically.htn better control no fluctuation hr improved.      1/6/2021  Here for preop clearance for carpal tunnel. She is asymptomatic cardiac wise she works as  has no complaints of chest pain or shortness of breath had gallbladder surgery last year no complication has been dropping items from hand  Has numbness and tingling she needs bilateral carpal tunnel done . She is also looking at having bariatric surgery.      3/11/2021 YVES HUYNH  Ms. Betts is a 50 year old female patient whose current medical conditions include HTN, obesity, hypertriglyceridemia, DM type II, obesity, hypoventilation syndrome on nocturnal O2, and schizoaffective disorder who presents today for routine follow-up. She returns today and states she is doing clinicalli.y well. Main issue is still bilateral hand pain/tingling/nubmness. Scheduled for CTR in April, previously cleared by Dr. Mccormick. No change in status since last visit. No chest pain, heaviness, or tightness. No symptoms concerning for angina. No lightheadedness, dizziness, near syncope, or syncope. Does admit to occasional palpitations, notices it when she is stressed or anxious, not especially bothersome. No s/s suggestive of CHF. Does report getting winded if pollen is "bad outside". BP well-controlled. Patient is compliant with her " medications. Still works as an . Needs updated labs, orders placed.     9/15/2021   FEELS WELL COMPLIANT WITH MEDS DIET REVIEW OF DIGITAL; MEDICINE SHOWED GOOD CONTROL BP DIABETES. . MTZ SNO LEG SWELLING FORGETS TO USE O2 THERAPY CONTINOUSLY. HAS NO TIA CLAUDICVATION PALPITATIONS. HAS FLUCTUATION IN WEIGHT TRIES TO BE COMPLIANT TO LOSE WEIGHT.     3/16/2022  Here for f/u has had a sleeve  On adkin's diet has been losing weight has been exercising . Has been having swimming sensation in her head when lying down moving head or walking on treadmill. She has narcolepsy getting her sleep eval done this month she is on o2 [facility] at [facility]. Has no other complaints her lipids are improved remarkably.     6/30/2022  Comes in for follow up   BP was elevated, meds adjusted by PCP   Feels well now , and BP controlled  LDL not at goal given her DM   Stable on follow up     Past Medical History:   Diagnosis Date    Abnormal Pap smear of cervix     HPV genital warts    Anemia     Anxiety     Arthritis     Asthma 10/11/2016    Bipolar 1 disorder     Diabetes mellitus, type 2     Dyslipidemia associated with type 2 diabetes mellitus 5/13/2019    General anesthetics causing adverse effect in therapeutic use     Genital warts     GERD (gastroesophageal reflux disease)     Herpes simplex virus (HSV) infection     Hyperlipidemia     Hypertension     Hypertension complicating diabetes 5/5/2019    Migraine with aura and without status migrainosus, not intractable 3/21/2016    Mild persistent asthma without complication 10/11/2016    Morbid obesity with body mass index (BMI) of 45.0 to 49.9 in adult 8/3/2017    Obstructive sleep apnea     ALEN (obstructive sleep apnea)     2L per N/C q HS    Schizoaffective disorder, bipolar type 4/25/2019    Seasonal allergic rhinitis due to pollen 5/13/2019    Type 2 diabetes mellitus with hyperglycemia, with long-term current use of insulin 12/21/2017    Type 2  "diabetes mellitus with hyperglycemia, without long-term current use of insulin 2017       Past Surgical History:   Procedure Laterality Date    abcess removed right back      BELT ABDOMINOPLASTY  1996    BREAST SURGERY Bilateral 1996    Reduction     SECTION      X 2    COLONOSCOPY      COLONOSCOPY N/A 2018    Procedure: colonoscopy for iron deficiency anemia;  Surgeon: Frankie Sanchez MD;  Location: [facility] ENDO;  Service: Endoscopy;  Laterality: N/A;    COLONOSCOPY N/A 2018    Procedure: COLONOSCOPY;  Surgeon: Frankie Sanchez MD;  Location: [facility] ENDO;  Service: Endoscopy;  Laterality: N/A;    HYSTERECTOMY      w/ BSO; hypermenorrhea    MOUTH SURGERY      OOPHORECTOMY      hyst/bso, hypermenorrhea    ROBOT-ASSISTED CHOLECYSTECTOMY USING DA DARI XI N/A 1/3/2020    Procedure: XI ROBOTIC CHOLECYSTECTOMY;  Surgeon: Damir Driscoll MD;  Location: [facility] OR;  Service: General;  Laterality: N/A;    TOTAL REDUCTION MAMMOPLASTY      TUBAL LIGATION         Social History     Tobacco Use    Smoking status: Never Smoker    Smokeless tobacco: Never Used   Substance Use Topics    Alcohol use: Yes     Alcohol/week: 0.0 standard drinks     Comment: socially  No alcohol 72h prior to sx    Drug use: No       Family History   Problem Relation Age of Onset    Diabetes Mother     Hyperlipidemia Mother     Hypertension Mother     Asthma Mother     COPD Mother     Glaucoma Mother     Thyroid disease Mother     Anesthesia problems Mother         "almost had a cardiac arrest" , blood clots    Hypertension Father     Hyperlipidemia Father     Cancer Father         Brain, lung, liver, kidney    Heart disease Maternal Grandmother     Hyperlipidemia Maternal Grandmother     Hypertension Maternal Grandmother     Cataracts Maternal Grandmother     Diabetes Maternal Grandmother     Heart disease Maternal Grandfather     Hyperlipidemia Maternal Grandfather     Hypertension " Maternal Grandfather     Glaucoma Maternal Grandfather     Cancer Maternal Grandfather     Cataracts Maternal Grandfather     Macular degeneration Maternal Grandfather     Diabetes Maternal Grandfather     Heart disease Paternal Grandmother     Hyperlipidemia Paternal Grandmother     Hypertension Paternal Grandmother     Cataracts Paternal Grandmother     Heart disease Paternal Grandfather     Hyperlipidemia Paternal Grandfather     Hypertension Paternal Grandfather     Cataracts Paternal Grandfather     Breast cancer Maternal Cousin     Breast cancer Maternal Cousin     Breast cancer Maternal Cousin     Breast cancer Maternal Cousin        Current Outpatient Medications   Medication Sig    albuterol (PROVENTIL/VENTOLIN HFA) 90 mcg/actuation inhaler Inhale 2 puffs into the lungs every 6 hours as needed for wheezing    amlodipine-valsartan (EXFORGE)  mg per tablet Take 1 tablet by mouth once daily.    aspirin (ECOTRIN) 81 MG EC tablet Take 81 mg by mouth once daily.    budesonide-formoterol 160-4.5 mcg (SYMBICORT) 160-4.5 mcg/actuation HFAA Inhale 2 puffs into the lungs every 12 (twelve) hours. Controller : Use spacer    clonazePAM (KLONOPIN) 1 MG tablet Take 1 tablet (1 mg total) by mouth once daily.    DULoxetine (CYMBALTA) 60 MG capsule Take 1 capsule (60 mg total) by mouth once daily.    fenofibrate (TRICOR) 145 MG tablet Take 1 tablet (145 mg total) by mouth once daily.    fish oil-omega-3 fatty acids 300-1,000 mg capsule Take 1 capsule by mouth once daily.    frovatriptan (FROVA) 2.5 MG tablet Take 1 tablet at onset of acute migraine. May take 1 more tablet 2 hours later if needed. (MAX 2 TABLET PER DAY. MAX 4 TABLETS PER WEEK.)    furosemide (LASIX) 20 MG tablet Take 1 tablet (20 mg total) by mouth once daily.    insulin aspart U-100 (NOVOLOG FLEXPEN U-100 INSULIN) 100 unit/mL (3 mL) InPn pen Inject 10 Units into the skin 3 (three) times daily before meals. Use based on  "sliding scale    lancets (ONETOUCH DELICA PLUS LANCET) 30 gauge Misc Use as directed 3 times daily (Patient taking differently: Use as directed 3 times daily)    lurasidone (LATUDA) 60 mg Tab tablet Take 1 tablet by mouth daily with 350 calories    magnesium oxide (MAG-OX) 400 mg (241.3 mg magnesium) tablet Take 1 tablet (400 mg total) by mouth once daily.    metFORMIN (GLUCOPHAGE) 1000 MG tablet Take 1 tablet (1,000 mg total) by mouth 2 (two) times daily with meals.    metoprolol succinate (TOPROL-XL) 50 MG 24 hr tablet Take 1 tablet (50 mg total) by mouth every evening. for 14 days    MULTIVIT,CALC,MINS/IRON/FOLIC (DAILY MULTIPLE FOR WOMEN ORAL) Take 1 tablet by mouth once daily.    plecanatide (TRULANCE) 3 mg Tab Take 3 mg by mouth once daily.    spironolactone (ALDACTONE) 25 MG tablet Take 1 tablet (25 mg total) by mouth once daily.    tiZANidine (ZANAFLEX) 4 MG tablet Take 1 tablet (4 mg total) by mouth every 8 (eight) hours as needed (pain).    atorvastatin (LIPITOR) 20 MG tablet Take 1 tablet (20 mg total) by mouth every evening.    BD INSULIN SYRINGE ULTRA-FINE 1/2 mL 30 gauge x 1/2" Syrg     blood sugar diagnostic (ONETOUCH ULTRA TEST) Strp Check blood glucose 4 times daily as directed and as needed (dispense insurance preferred brand or patient choice)    blood sugar diagnostic Strp 1 each by Misc.(Non-Drug; Combo Route) route 4 (four) times daily. [facility] Glucose Test Strips    blood-glucose meter Misc Use as directed (Patient taking differently: Use as directed)    cycloSPORINE 0.05 % Drop Place 1 drop into both eyes 2 (two) times daily.    diclofenac sodium (VOLTAREN) 1 % Gel Apply 2 grams topically 4 (four) times daily as needed.    flash glucose scanning reader (Swapferit AMY 2 READER) Misc Use as directed to check blood sugar    LIDOcaine-prilocaine (EMLA) cream Apply topically up to 4 times per day to affected area for pain relief (Patient taking differently: Apply topically up " "to 4 times per day to affected area for pain relief)    pen needle, diabetic (BD ULTRA-FINE MINI PEN NEEDLE) 31 gauge x 3/16" Ndle Use 5 a day    pen needle, diabetic (BD ULTRA-FINE MINI PEN NEEDLE) 31 gauge x 3/16" Ndle Use one needle 5 times a day (Patient taking differently: Use one needle 5 times a day)     No current facility-administered medications for this visit.     Current Outpatient Medications on File Prior to Visit   Medication Sig    albuterol (PROVENTIL/VENTOLIN HFA) 90 mcg/actuation inhaler Inhale 2 puffs into the lungs every 6 hours as needed for wheezing    amlodipine-valsartan (EXFORGE)  mg per tablet Take 1 tablet by mouth once daily.    aspirin (ECOTRIN) 81 MG EC tablet Take 81 mg by mouth once daily.    budesonide-formoterol 160-4.5 mcg (SYMBICORT) 160-4.5 mcg/actuation HFAA Inhale 2 puffs into the lungs every 12 (twelve) hours. Controller : Use spacer    clonazePAM (KLONOPIN) 1 MG tablet Take 1 tablet (1 mg total) by mouth once daily.    DULoxetine (CYMBALTA) 60 MG capsule Take 1 capsule (60 mg total) by mouth once daily.    fenofibrate (TRICOR) 145 MG tablet Take 1 tablet (145 mg total) by mouth once daily.    fish oil-omega-3 fatty acids 300-1,000 mg capsule Take 1 capsule by mouth once daily.    frovatriptan (FROVA) 2.5 MG tablet Take 1 tablet at onset of acute migraine. May take 1 more tablet 2 hours later if needed. (MAX 2 TABLET PER DAY. MAX 4 TABLETS PER WEEK.)    furosemide (LASIX) 20 MG tablet Take 1 tablet (20 mg total) by mouth once daily.    insulin aspart U-100 (NOVOLOG FLEXPEN U-100 INSULIN) 100 unit/mL (3 mL) InPn pen Inject 10 Units into the skin 3 (three) times daily before meals. Use based on sliding scale    lancets (ONETOUCH DELICA PLUS LANCET) 30 gauge Misc Use as directed 3 times daily (Patient taking differently: Use as directed 3 times daily)    lurasidone (LATUDA) 60 mg Tab tablet Take 1 tablet by mouth daily with 350 calories    magnesium oxide " "(MAG-OX) 400 mg (241.3 mg magnesium) tablet Take 1 tablet (400 mg total) by mouth once daily.    metFORMIN (GLUCOPHAGE) 1000 MG tablet Take 1 tablet (1,000 mg total) by mouth 2 (two) times daily with meals.    metoprolol succinate (TOPROL-XL) 50 MG 24 hr tablet Take 1 tablet (50 mg total) by mouth every evening. for 14 days    MULTIVIT,CALC,MINS/IRON/FOLIC (DAILY MULTIPLE FOR WOMEN ORAL) Take 1 tablet by mouth once daily.    plecanatide (TRULANCE) 3 mg Tab Take 3 mg by mouth once daily.    spironolactone (ALDACTONE) 25 MG tablet Take 1 tablet (25 mg total) by mouth once daily.    tiZANidine (ZANAFLEX) 4 MG tablet Take 1 tablet (4 mg total) by mouth every 8 (eight) hours as needed (pain).    BD INSULIN SYRINGE ULTRA-FINE 1/2 mL 30 gauge x 1/2" Syrg     blood sugar diagnostic (ONETOUCH ULTRA TEST) Strp Check blood glucose 4 times daily as directed and as needed (dispense insurance preferred brand or patient choice)    blood sugar diagnostic Strp 1 each by Misc.(Non-Drug; Combo Route) route 4 (four) times daily. [facility] Glucose Test Strips    blood-glucose meter Misc Use as directed (Patient taking differently: Use as directed)    cycloSPORINE 0.05 % Drop Place 1 drop into both eyes 2 (two) times daily.    diclofenac sodium (VOLTAREN) 1 % Gel Apply 2 grams topically 4 (four) times daily as needed.    flash glucose scanning reader (FREESTYLE AMY 2 READER) Misc Use as directed to check blood sugar    LIDOcaine-prilocaine (EMLA) cream Apply topically up to 4 times per day to affected area for pain relief (Patient taking differently: Apply topically up to 4 times per day to affected area for pain relief)    pen needle, diabetic (BD ULTRA-FINE MINI PEN NEEDLE) 31 gauge x 3/16" Ndle Use 5 a day    pen needle, diabetic (BD ULTRA-FINE MINI PEN NEEDLE) 31 gauge x 3/16" Ndle Use one needle 5 times a day (Patient taking differently: Use one needle 5 times a day)    [DISCONTINUED] amLODIPine (NORVASC) 10 MG " tablet Take 10 mg by mouth once daily. 10 mg in am  5 mg in pm    [DISCONTINUED] glucagon, human recombinant, (GLUCAGON EMERGENCY KIT, HUMAN,) 1 mg SolR Inject 1 mg into the muscle as needed. Emergency use    [DISCONTINUED] valsartan (DIOVAN) 320 MG tablet Take 1 tablet (320 mg total) by mouth once daily.     No current facility-administered medications on file prior to visit.     Review of patient's allergies indicates:   Allergen Reactions    Codeine Itching    Hydromorphone Other (See Comments)     Can't wake up for long time if taken  Slow to wake up after surgery after receiving    Lyrica [pregabalin] Itching    Neuromuscular blockers, steroidal Hives     some    Latex, natural rubber Rash    Morphine Rash     itching    Norco [hydrocodone-acetaminophen] Itching, Rash and Hallucinations    Seconal [secobarbital sodium] Rash     itching    Tylox [oxycodone-acetaminophen] Rash       Review of Systems   Constitutional: Positive for weight loss. Negative for diaphoresis, malaise/fatigue and weight gain.   HENT: Negative for hoarse voice.    Eyes: Negative for double vision and visual disturbance.   Cardiovascular: Negative for chest pain, claudication, cyanosis, dyspnea on exertion, irregular heartbeat, leg swelling, near-syncope, orthopnea, palpitations, paroxysmal nocturnal dyspnea and syncope.   Respiratory: Negative for cough, hemoptysis, shortness of breath and snoring.    Hematologic/Lymphatic: Negative for bleeding problem. Does not bruise/bleed easily.   Skin: Negative for color change and poor wound healing.   Musculoskeletal: Negative for muscle cramps, muscle weakness and myalgias.   Gastrointestinal: Negative for bloating, abdominal pain, change in bowel habit, diarrhea, heartburn, hematemesis, hematochezia, melena and nausea.   Neurological: Positive for dizziness and vertigo. Negative for excessive daytime sleepiness, headaches, light-headedness, loss of balance, numbness and weakness.    Psychiatric/Behavioral: Negative for memory loss. The patient does not have insomnia.    Allergic/Immunologic: Negative for hives.       Objective:   Physical Exam  Constitutional:       General: She is not in acute distress.     Appearance: Normal appearance. She is well-developed. She is not ill-appearing.   HENT:      Head: Normocephalic and atraumatic.   Eyes:      General: No scleral icterus.     Pupils: Pupils are equal, round, and reactive to light.   Neck:      Thyroid: No thyromegaly.      Vascular: Normal carotid pulses. No carotid bruit, hepatojugular reflux or JVD.      Trachea: No tracheal deviation.   Cardiovascular:      Rate and Rhythm: Normal rate and regular rhythm.      Pulses: Normal pulses.      Heart sounds: Normal heart sounds. No murmur heard.    No friction rub. No gallop.   Pulmonary:      Effort: Pulmonary effort is normal. No respiratory distress.      Breath sounds: Normal breath sounds. No wheezing, rhonchi or rales.   Chest:      Chest wall: No tenderness.   Abdominal:      General: Bowel sounds are normal. There is no abdominal bruit.      Palpations: Abdomen is soft. There is no hepatomegaly or pulsatile mass.      Tenderness: There is no abdominal tenderness.      Comments: Obese    Musculoskeletal:      Right shoulder: No deformity.      Cervical back: Normal range of motion and neck supple.   Skin:     General: Skin is warm and dry.      Findings: No erythema or rash.      Nails: There is no clubbing.   Neurological:      Mental Status: She is alert and oriented to person, place, and time.      Cranial Nerves: No cranial nerve deficit.      Coordination: Coordination normal.   Psychiatric:         Speech: Speech normal.         Behavior: Behavior normal.       Vitals:    06/30/22 1634   BP: 108/76   BP Location: Right arm   Patient Position: Sitting   BP Method: Medium (Manual)   Pulse: 85   SpO2: 96%   Weight: 82.7 kg (182 lb 5.1 oz)     Lab Results   Component Value Date     CHOL 187 06/24/2022    CHOL 174 03/08/2022    CHOL 203 (H) 02/07/2022     Lab Results   Component Value Date    HDL 38 (L) 06/24/2022    HDL 57 03/08/2022    HDL 48 02/07/2022     Lab Results   Component Value Date    LDLCALC 101.8 06/24/2022    LDLCALC 98.2 03/08/2022    LDLCALC 122.4 02/07/2022     Lab Results   Component Value Date    TRIG 236 (H) 06/24/2022    TRIG 94 03/08/2022    TRIG 163 (H) 02/07/2022     Lab Results   Component Value Date    CHOLHDL 20.3 06/24/2022    CHOLHDL 32.8 03/08/2022    CHOLHDL 23.6 02/07/2022       Chemistry        Component Value Date/Time     03/08/2022 0735    K 4.4 03/08/2022 0735     03/08/2022 0735    CO2 28 03/08/2022 0735    BUN 13 03/08/2022 0735    CREATININE 0.7 03/08/2022 0735    GLU 73 03/08/2022 0735        Component Value Date/Time    CALCIUM 9.6 03/08/2022 0735    ALKPHOS 75 03/08/2022 0735    AST 13 03/08/2022 0735    ALT 15 03/08/2022 0735    BILITOT 0.2 03/08/2022 0735    ESTGFRAFRICA >60.0 03/08/2022 0735    EGFRNONAA >60.0 03/08/2022 0735          Lab Results   Component Value Date    TSH 1.730 03/24/2021     Lab Results   Component Value Date    INR 1.0 06/26/2020    INR 0.9 11/23/2016     Lab Results   Component Value Date    WBC 8.51 06/08/2021    HGB 11.0 (L) 06/08/2021    HCT 36.6 (L) 06/08/2021    MCV 90 06/08/2021     06/08/2021     BMP  Sodium   Date Value Ref Range Status   03/08/2022 140 136 - 145 mmol/L Final     Potassium   Date Value Ref Range Status   03/08/2022 4.4 3.5 - 5.1 mmol/L Final     Chloride   Date Value Ref Range Status   03/08/2022 103 95 - 110 mmol/L Final     CO2   Date Value Ref Range Status   03/08/2022 28 23 - 29 mmol/L Final     BUN   Date Value Ref Range Status   03/08/2022 13 6 - 20 mg/dL Final     Creatinine   Date Value Ref Range Status   03/08/2022 0.7 0.5 - 1.4 mg/dL Final     Calcium   Date Value Ref Range Status   03/08/2022 9.6 8.7 - 10.5 mg/dL Final     Anion Gap   Date Value Ref Range Status    03/08/2022 9 8 - 16 mmol/L Final     eGFR if    Date Value Ref Range Status   03/08/2022 >60.0 >60 mL/min/1.73 m^2 Final     eGFR if non    Date Value Ref Range Status   03/08/2022 >60.0 >60 mL/min/1.73 m^2 Final     Comment:     Calculation used to obtain the estimated glomerular filtration  rate (eGFR) is the CKD-EPI equation.        CrCl cannot be calculated (Patient's most recent lab result is older than the maximum 7 days allowed.).  Lab Results   Component Value Date    HGBA1C 6.2 (H) 03/08/2022       Assessment:     1. Dyslipidemia associated with type 2 diabetes mellitus    2. Hypertension complicating diabetes    3. Hypertriglyceridemia    4. Idiopathic hypersomnolence    5. DM type 2 with diabetic peripheral neuropathy    6. Sleep problem caused by drug    7. Asthma, unspecified asthma severity, unspecified whether complicated, unspecified whether persistent    8. Morbid obesity with BMI of 40.0-44.9, adult    9. Obesity hypoventilation syndrome        Plan:   Continue current therapy  Cardiac low salt diet.  Risk factor modification and excercise program.  bp under better control   Reviewed current meds as above  LDL not at goal , added lipitor , on fenofibrate  Reviewed all tests and above medical conditions with patient in detail and formulated treatment plan.  Risk factor modification discussed.   Cardiac low salt diet discussed.  Maintaining healthy weight and weight loss goals were discussed in clinic.  RTC with DR JONES

## 2022-07-08 ENCOUNTER — TELEPHONE (OUTPATIENT)
Dept: DIABETES | Facility: CLINIC | Age: 52
End: 2022-07-08
Payer: MEDICAID

## 2022-07-08 DIAGNOSIS — E11.65 TYPE 2 DIABETES MELLITUS WITH HYPERGLYCEMIA, WITH LONG-TERM CURRENT USE OF INSULIN: ICD-10-CM

## 2022-07-08 DIAGNOSIS — Z79.4 TYPE 2 DIABETES MELLITUS WITH HYPERGLYCEMIA, WITH LONG-TERM CURRENT USE OF INSULIN: ICD-10-CM

## 2022-07-08 RX ORDER — PEN NEEDLE, DIABETIC 30 GX3/16"
NEEDLE, DISPOSABLE MISCELLANEOUS
Qty: 200 EACH | Refills: 3 | Status: SHIPPED | OUTPATIENT
Start: 2022-07-08 | End: 2023-06-14 | Stop reason: SDUPTHER

## 2022-07-08 RX ORDER — INSULIN ASPART 100 [IU]/ML
10 INJECTION, SOLUTION INTRAVENOUS; SUBCUTANEOUS
Qty: 15 ML | Refills: 3 | Status: SHIPPED | OUTPATIENT
Start: 2022-07-08 | End: 2022-08-26 | Stop reason: SDUPTHER

## 2022-07-08 RX ORDER — INSULIN GLARGINE 300 U/ML
86 INJECTION, SOLUTION SUBCUTANEOUS DAILY
Qty: 4 PEN | Refills: 1 | Status: SHIPPED | OUTPATIENT
Start: 2022-07-08 | End: 2022-08-26 | Stop reason: SDUPTHER

## 2022-07-08 NOTE — TELEPHONE ENCOUNTER
"----- Message from Marcelina Nation sent at 7/8/2022 11:08 AM CDT -----  Pt need a refill on     insulin aspart U-100 (NOVOLOG FLEXPEN U-100 INSULIN) 100 unit/mL (3 mL) InPn pen    Toujeo Solorstar max pen 300 unit per ml    pen needle, diabetic (BD ULTRA-FINE MINI PEN NEEDLE) 31 gauge x 3/16" Abby        [facility] - [facility] - [address] [place]   [address] [place]   [place] [address]  Phone: [phone] Fax: [phone]      Pt can be reached at [phone] (home)     "

## 2022-07-13 ENCOUNTER — OFFICE VISIT (OUTPATIENT)
Dept: PRIMARY CARE CLINIC | Facility: CLINIC | Age: 52
End: 2022-07-13
Payer: MEDICAID

## 2022-07-13 DIAGNOSIS — E78.2 MIXED HYPERLIPIDEMIA: Primary | ICD-10-CM

## 2022-07-13 DIAGNOSIS — E66.01 MORBID OBESITY: ICD-10-CM

## 2022-07-13 DIAGNOSIS — I10 ESSENTIAL HYPERTENSION: ICD-10-CM

## 2022-07-13 PROCEDURE — 3044F HG A1C LEVEL LT 7.0%: CPT | Mod: CPTII,95,, | Performed by: FAMILY MEDICINE

## 2022-07-13 PROCEDURE — 3072F LOW RISK FOR RETINOPATHY: CPT | Mod: CPTII,95,, | Performed by: FAMILY MEDICINE

## 2022-07-13 PROCEDURE — 1160F RVW MEDS BY RX/DR IN RCRD: CPT | Mod: CPTII,95,, | Performed by: FAMILY MEDICINE

## 2022-07-13 PROCEDURE — 3066F PR DOCUMENTATION OF TREATMENT FOR NEPHROPATHY: ICD-10-PCS | Mod: CPTII,95,, | Performed by: FAMILY MEDICINE

## 2022-07-13 PROCEDURE — 1159F PR MEDICATION LIST DOCUMENTED IN MEDICAL RECORD: ICD-10-PCS | Mod: CPTII,95,, | Performed by: FAMILY MEDICINE

## 2022-07-13 PROCEDURE — 3044F PR MOST RECENT HEMOGLOBIN A1C LEVEL <7.0%: ICD-10-PCS | Mod: CPTII,95,, | Performed by: FAMILY MEDICINE

## 2022-07-13 PROCEDURE — 99214 OFFICE O/P EST MOD 30 MIN: CPT | Mod: 95,,, | Performed by: FAMILY MEDICINE

## 2022-07-13 PROCEDURE — 3066F NEPHROPATHY DOC TX: CPT | Mod: CPTII,95,, | Performed by: FAMILY MEDICINE

## 2022-07-13 PROCEDURE — 4010F ACE/ARB THERAPY RXD/TAKEN: CPT | Mod: CPTII,95,, | Performed by: FAMILY MEDICINE

## 2022-07-13 PROCEDURE — 3061F NEG MICROALBUMINURIA REV: CPT | Mod: CPTII,95,, | Performed by: FAMILY MEDICINE

## 2022-07-13 PROCEDURE — 1159F MED LIST DOCD IN RCRD: CPT | Mod: CPTII,95,, | Performed by: FAMILY MEDICINE

## 2022-07-13 PROCEDURE — 4010F PR ACE/ARB THEARPY RXD/TAKEN: ICD-10-PCS | Mod: CPTII,95,, | Performed by: FAMILY MEDICINE

## 2022-07-13 PROCEDURE — 99214 PR OFFICE/OUTPT VISIT, EST, LEVL IV, 30-39 MIN: ICD-10-PCS | Mod: 95,,, | Performed by: FAMILY MEDICINE

## 2022-07-13 PROCEDURE — 3061F PR NEG MICROALBUMINURIA RESULT DOCUMENTED/REVIEW: ICD-10-PCS | Mod: CPTII,95,, | Performed by: FAMILY MEDICINE

## 2022-07-13 PROCEDURE — 3072F PR LOW RISK FOR RETINOPATHY: ICD-10-PCS | Mod: CPTII,95,, | Performed by: FAMILY MEDICINE

## 2022-07-13 PROCEDURE — 1160F PR REVIEW ALL MEDS BY PRESCRIBER/CLIN PHARMACIST DOCUMENTED: ICD-10-PCS | Mod: CPTII,95,, | Performed by: FAMILY MEDICINE

## 2022-07-13 NOTE — PROGRESS NOTES
Subjective:      The patient location is: [place] at home  Visit type: Virtual visit with synchronous audio and video  Total time spent with patient: 30 mins  Each patient to whom he or she provides medical services by telemedicine is:  (1) informed of the relationship between the physician and patient and the respective role of any other health care provider with respect to management of the patient; and (2) notified that he or she may decline to receive medical services by telemedicine and may withdraw from such care at any time.       Patient ID: Yolanda Betts is a 52 y.o. female.    Chief Complaint: HTN, HLD and obesity      HPI   History of Present Illness:   Yolanda Betts 52 y.o. female presents today with     The 10-year ASCVD risk score (Jose TRENT Jr., et al., 2013) is: 6.4%    Values used to calculate the score:      Age: 52 years      Sex: Female      Is Non- : Yes      Diabetic: Yes      Tobacco smoker: No      Systolic Blood Pressure: 108 mmHg      Is BP treated: Yes      HDL Cholesterol: 38 mg/dL      Total Cholesterol: 187 mg/dL     HTN: Follow up visit and it is controlled.  Obesity: loosing weight.     Going on a cruise and needs covid test.  Past Medical History:   Diagnosis Date    Abnormal Pap smear of cervix     HPV genital warts    Anemia     Anxiety     Arthritis     Asthma 10/11/2016    Bipolar 1 disorder     Diabetes mellitus, type 2     Dyslipidemia associated with type 2 diabetes mellitus 5/13/2019    General anesthetics causing adverse effect in therapeutic use     Genital warts     GERD (gastroesophageal reflux disease)     Herpes simplex virus (HSV) infection     Hyperlipidemia     Hypertension     Hypertension complicating diabetes 5/5/2019    Migraine with aura and without status migrainosus, not intractable 3/21/2016    Mild persistent asthma without complication 10/11/2016    Morbid obesity with body mass index (BMI) of 45.0 to  "49.9 in adult 8/3/2017    Obstructive sleep apnea     ALEN (obstructive sleep apnea)     2L per N/C q HS    Schizoaffective disorder, bipolar type 4/25/2019    Seasonal allergic rhinitis due to pollen 5/13/2019    Type 2 diabetes mellitus with hyperglycemia, with long-term current use of insulin 12/21/2017    Type 2 diabetes mellitus with hyperglycemia, without long-term current use of insulin 12/21/2017     Family History   Problem Relation Age of Onset    Diabetes Mother     Hyperlipidemia Mother     Hypertension Mother     Asthma Mother     COPD Mother     Glaucoma Mother     Thyroid disease Mother     Anesthesia problems Mother         "almost had a cardiac arrest" , blood clots    Hypertension Father     Hyperlipidemia Father     Cancer Father         Brain, lung, liver, kidney    Heart disease Maternal Grandmother     Hyperlipidemia Maternal Grandmother     Hypertension Maternal Grandmother     Cataracts Maternal Grandmother     Diabetes Maternal Grandmother     Heart disease Maternal Grandfather     Hyperlipidemia Maternal Grandfather     Hypertension Maternal Grandfather     Glaucoma Maternal Grandfather     Cancer Maternal Grandfather     Cataracts Maternal Grandfather     Macular degeneration Maternal Grandfather     Diabetes Maternal Grandfather     Heart disease Paternal Grandmother     Hyperlipidemia Paternal Grandmother     Hypertension Paternal Grandmother     Cataracts Paternal Grandmother     Heart disease Paternal Grandfather     Hyperlipidemia Paternal Grandfather     Hypertension Paternal Grandfather     Cataracts Paternal Grandfather     Breast cancer Maternal Cousin     Breast cancer Maternal Cousin     Breast cancer Maternal Cousin     Breast cancer Maternal Cousin      Social History     Socioeconomic History    Marital status: Single   Tobacco Use    Smoking status: Never Smoker    Smokeless tobacco: Never Used   Substance and Sexual Activity    " "Alcohol use: Yes     Alcohol/week: 0.0 standard drinks     Comment: socially  No alcohol 72h prior to sx    Drug use: No    Sexual activity: Yes     Partners: Male     Birth control/protection: Surgical     Comment: hyst   Social History Narrative    Long-term care nurse     Outpatient Encounter Medications as of 7/13/2022   Medication Sig Dispense Refill    albuterol (PROVENTIL/VENTOLIN HFA) 90 mcg/actuation inhaler Inhale 2 puffs into the lungs every 6 hours as needed for wheezing 8.5 g 3    amlodipine-valsartan (EXFORGE)  mg per tablet Take 1 tablet by mouth once daily. 90 tablet 1    aspirin (ECOTRIN) 81 MG EC tablet Take 81 mg by mouth once daily.      atorvastatin (LIPITOR) 20 MG tablet Take 1 tablet (20 mg total) by mouth every evening. 90 tablet 3    BD INSULIN SYRINGE ULTRA-FINE 1/2 mL 30 gauge x 1/2" Syrg   0    blood sugar diagnostic (ONETOUCH ULTRA TEST) Strp Check blood glucose 4 times daily as directed and as needed (dispense insurance preferred brand or patient choice) 200 each 5    blood sugar diagnostic Strp 1 each by Misc.(Non-Drug; Combo Route) route 4 (four) times daily. [facility] Glucose Test Strips 400 strip 3    blood-glucose meter Misc Use as directed (Patient taking differently: Use as directed) 1 each 0    budesonide-formoterol 160-4.5 mcg (SYMBICORT) 160-4.5 mcg/actuation HFAA Inhale 2 puffs into the lungs every 12 (twelve) hours. Controller : Use spacer 10.2 g 11    clonazePAM (KLONOPIN) 1 MG tablet Take 1 tablet (1 mg total) by mouth once daily. 30 tablet 2    cycloSPORINE 0.05 % Drop Place 1 drop into both eyes 2 (two) times daily. 5.5 mL 11    diclofenac sodium (VOLTAREN) 1 % Gel Apply 2 grams topically 4 (four) times daily as needed. 5 each 0    DULoxetine (CYMBALTA) 60 MG capsule Take 1 capsule (60 mg total) by mouth once daily. 30 capsule 2    fenofibrate (TRICOR) 145 MG tablet Take 1 tablet (145 mg total) by mouth once daily. 90 tablet 1    fish oil-omega-3 " "fatty acids 300-1,000 mg capsule Take 1 capsule by mouth once daily.      flash glucose scanning reader (FREESTYLE AMY 2 READER) Misc Use as directed to check blood sugar 1 each 1    frovatriptan (FROVA) 2.5 MG tablet Take 1 tablet at onset of acute migraine. May take 1 more tablet 2 hours later if needed. (MAX 2 TABLET PER DAY. MAX 4 TABLETS PER WEEK.) 9 tablet 1    furosemide (LASIX) 20 MG tablet Take 1 tablet (20 mg total) by mouth once daily. 30 tablet 11    insulin aspart U-100 (NOVOLOG FLEXPEN U-100 INSULIN) 100 unit/mL (3 mL) InPn pen Inject 10 Units into the skin 3 (three) times daily before meals. Use based on sliding scale 15 mL 3    insulin glargine U-300 conc (TOUJEO MAX U-300 SOLOSTAR) 300 unit/mL (3 mL) insulin pen Inject 86 Units into the skin once daily. 4 pen 1    lancets (ONETOUCH DELICA PLUS LANCET) 30 gauge Misc Use as directed 3 times daily (Patient taking differently: Use as directed 3 times daily) 100 each 11    LIDOcaine-prilocaine (EMLA) cream Apply topically up to 4 times per day to affected area for pain relief (Patient taking differently: Apply topically up to 4 times per day to affected area for pain relief) 60 g 0    lurasidone (LATUDA) 60 mg Tab tablet Take 1 tablet by mouth daily with 350 calories 30 tablet 2    magnesium oxide (MAG-OX) 400 mg (241.3 mg magnesium) tablet Take 1 tablet (400 mg total) by mouth once daily. 90 tablet 3    metFORMIN (GLUCOPHAGE) 1000 MG tablet Take 1 tablet (1,000 mg total) by mouth 2 (two) times daily with meals. 180 tablet 0    metoprolol succinate (TOPROL-XL) 50 MG 24 hr tablet Take 1 tablet (50 mg total) by mouth every evening. 90 tablet 0    MULTIVIT,CALC,MINS/IRON/FOLIC (DAILY MULTIPLE FOR WOMEN ORAL) Take 1 tablet by mouth once daily.      pen needle, diabetic (BD ULTRA-FINE MINI PEN NEEDLE) 31 gauge x 3/16" Ndle Use one needle 5 times a day (Patient taking differently: Use one needle 5 times a day) 200 each 3    pen needle, diabetic " "(BD ULTRA-FINE MINI PEN NEEDLE) 31 gauge x 3/16" Ndle Use 5 a day 200 each 3    plecanatide (TRULANCE) 3 mg Tab Take 3 mg by mouth once daily. 90 tablet 0    spironolactone (ALDACTONE) 25 MG tablet Take 1 tablet (25 mg total) by mouth once daily. 30 tablet 6    tiZANidine (ZANAFLEX) 4 MG tablet Take 1 tablet (4 mg total) by mouth every 8 (eight) hours as needed (pain). 90 tablet 2    [DISCONTINUED] amLODIPine (NORVASC) 10 MG tablet Take 10 mg by mouth once daily. 10 mg in am  5 mg in pm      [DISCONTINUED] glucagon, human recombinant, (GLUCAGON EMERGENCY KIT, HUMAN,) 1 mg SolR Inject 1 mg into the muscle as needed. Emergency use 1 each 1    [DISCONTINUED] valsartan (DIOVAN) 320 MG tablet Take 1 tablet (320 mg total) by mouth once daily. 90 tablet 3     No facility-administered encounter medications on file as of 7/13/2022.       Review of Systems   Respiratory: Negative for shortness of breath.    Cardiovascular: Negative for chest pain and palpitations.   Musculoskeletal: Positive for neck pain.   Neurological: Positive for headaches.       Review of Systems      A complete 10 point ROS was completed and are positive as per above HPI.    Otherwise negative for fever, diplopia, chest pain, shortness of breath, vomiting, blood in urine, joint pain, skin rash, seizures and unusual bleeding.     Objective:      There were no vitals taken for this visit.  Physical Exam    CONSTITUTIONAL: No apparent distress. Appears comfortable. Does not appear acutely ill or septic. Appears adequately hydrated.  CARDIOVASCULAR: No perioral cyanosis  PULMONARY: Breathing unlabored. No retractions Chest expansion grossly normal.  PSYCHIATRIC: Alert and conversant and grossly oriented. Mood is grossly neutral. Affect appropriate. Judgment and insight grossly intact.  NEUROLOGIC: No focal sensory deficits reported.   Results for orders placed or performed in visit on 06/24/22   Lipid Panel   Result Value Ref Range    Cholesterol 187 " 120 - 199 mg/dL    Triglycerides 236 (H) 30 - 150 mg/dL    HDL 38 (L) 40 - 75 mg/dL    LDL Cholesterol 101.8 63.0 - 159.0 mg/dL    HDL/Cholesterol Ratio 20.3 20.0 - 50.0 %    Total Cholesterol/HDL Ratio 4.9 2.0 - 5.0    Non-HDL Cholesterol 149 mg/dL     *Note: Due to a large number of results and/or encounters for the requested time period, some results have not been displayed. A complete set of results can be found in Results Review.     Assessment:       1. Mixed hyperlipidemia    2. Essential hypertension    3. Morbid obesity    4. BMI 40.0-44.9, adult        Plan:   Mixed hyperlipidemia  -     Lipids Digital Medicine (LDMP) Enrollment Order  -     Lipids Digital Medicine (LDMP) Enrollment Order    Essential hypertension    Morbid obesity    BMI 40.0-44.9, adult      Chronic Condition, Stable, latest lab result reviewed, medications reviewed-no side effects, counseled to take all meds as prescribed. No change in medication today, continue current regimen.     Treatment options and alternatives were discussed with the patient. Patient was given ample time to ask questions. All questions were answered. Voices understanding and acceptance of this advice. Will call back if any further questions or concerns.    Sasha Sorenson MD

## 2022-07-29 ENCOUNTER — OFFICE VISIT (OUTPATIENT)
Dept: URGENT CARE | Facility: CLINIC | Age: 52
End: 2022-07-29
Payer: MEDICAID

## 2022-07-29 VITALS
RESPIRATION RATE: 18 BRPM | TEMPERATURE: 98 F | WEIGHT: 175.63 LBS | OXYGEN SATURATION: 99 % | SYSTOLIC BLOOD PRESSURE: 141 MMHG | HEART RATE: 63 BPM | DIASTOLIC BLOOD PRESSURE: 65 MMHG | BODY MASS INDEX: 39.51 KG/M2 | HEIGHT: 56 IN

## 2022-07-29 DIAGNOSIS — J06.9 UPPER RESPIRATORY TRACT INFECTION, UNSPECIFIED TYPE: Primary | ICD-10-CM

## 2022-07-29 DIAGNOSIS — R09.81 SINUS CONGESTION: ICD-10-CM

## 2022-07-29 LAB
CTP QC/QA: YES
SARS-COV-2 RDRP RESP QL NAA+PROBE: NEGATIVE

## 2022-07-29 PROCEDURE — 3061F PR NEG MICROALBUMINURIA RESULT DOCUMENTED/REVIEW: ICD-10-PCS | Mod: CPTII,S$GLB,, | Performed by: EMERGENCY MEDICINE

## 2022-07-29 PROCEDURE — 3008F PR BODY MASS INDEX (BMI) DOCUMENTED: ICD-10-PCS | Mod: CPTII,S$GLB,, | Performed by: EMERGENCY MEDICINE

## 2022-07-29 PROCEDURE — 3061F NEG MICROALBUMINURIA REV: CPT | Mod: CPTII,S$GLB,, | Performed by: EMERGENCY MEDICINE

## 2022-07-29 PROCEDURE — 1159F MED LIST DOCD IN RCRD: CPT | Mod: CPTII,S$GLB,, | Performed by: EMERGENCY MEDICINE

## 2022-07-29 PROCEDURE — 3044F PR MOST RECENT HEMOGLOBIN A1C LEVEL <7.0%: ICD-10-PCS | Mod: CPTII,S$GLB,, | Performed by: EMERGENCY MEDICINE

## 2022-07-29 PROCEDURE — 99214 OFFICE O/P EST MOD 30 MIN: CPT | Mod: S$GLB,,, | Performed by: EMERGENCY MEDICINE

## 2022-07-29 PROCEDURE — 99214 PR OFFICE/OUTPT VISIT, EST, LEVL IV, 30-39 MIN: ICD-10-PCS | Mod: S$GLB,,, | Performed by: EMERGENCY MEDICINE

## 2022-07-29 PROCEDURE — U0002 COVID-19 LAB TEST NON-CDC: HCPCS | Mod: QW,S$GLB,, | Performed by: EMERGENCY MEDICINE

## 2022-07-29 PROCEDURE — 3078F PR MOST RECENT DIASTOLIC BLOOD PRESSURE < 80 MM HG: ICD-10-PCS | Mod: CPTII,S$GLB,, | Performed by: EMERGENCY MEDICINE

## 2022-07-29 PROCEDURE — 4010F ACE/ARB THERAPY RXD/TAKEN: CPT | Mod: CPTII,S$GLB,, | Performed by: EMERGENCY MEDICINE

## 2022-07-29 PROCEDURE — 3077F PR MOST RECENT SYSTOLIC BLOOD PRESSURE >= 140 MM HG: ICD-10-PCS | Mod: CPTII,S$GLB,, | Performed by: EMERGENCY MEDICINE

## 2022-07-29 PROCEDURE — 3072F LOW RISK FOR RETINOPATHY: CPT | Mod: CPTII,S$GLB,, | Performed by: EMERGENCY MEDICINE

## 2022-07-29 PROCEDURE — 3008F BODY MASS INDEX DOCD: CPT | Mod: CPTII,S$GLB,, | Performed by: EMERGENCY MEDICINE

## 2022-07-29 PROCEDURE — 3072F PR LOW RISK FOR RETINOPATHY: ICD-10-PCS | Mod: CPTII,S$GLB,, | Performed by: EMERGENCY MEDICINE

## 2022-07-29 PROCEDURE — 3066F PR DOCUMENTATION OF TREATMENT FOR NEPHROPATHY: ICD-10-PCS | Mod: CPTII,S$GLB,, | Performed by: EMERGENCY MEDICINE

## 2022-07-29 PROCEDURE — 3066F NEPHROPATHY DOC TX: CPT | Mod: CPTII,S$GLB,, | Performed by: EMERGENCY MEDICINE

## 2022-07-29 PROCEDURE — U0002: ICD-10-PCS | Mod: QW,S$GLB,, | Performed by: EMERGENCY MEDICINE

## 2022-07-29 PROCEDURE — 3078F DIAST BP <80 MM HG: CPT | Mod: CPTII,S$GLB,, | Performed by: EMERGENCY MEDICINE

## 2022-07-29 PROCEDURE — 3044F HG A1C LEVEL LT 7.0%: CPT | Mod: CPTII,S$GLB,, | Performed by: EMERGENCY MEDICINE

## 2022-07-29 PROCEDURE — 1160F PR REVIEW ALL MEDS BY PRESCRIBER/CLIN PHARMACIST DOCUMENTED: ICD-10-PCS | Mod: CPTII,S$GLB,, | Performed by: EMERGENCY MEDICINE

## 2022-07-29 PROCEDURE — 1160F RVW MEDS BY RX/DR IN RCRD: CPT | Mod: CPTII,S$GLB,, | Performed by: EMERGENCY MEDICINE

## 2022-07-29 PROCEDURE — 3077F SYST BP >= 140 MM HG: CPT | Mod: CPTII,S$GLB,, | Performed by: EMERGENCY MEDICINE

## 2022-07-29 PROCEDURE — 4010F PR ACE/ARB THEARPY RXD/TAKEN: ICD-10-PCS | Mod: CPTII,S$GLB,, | Performed by: EMERGENCY MEDICINE

## 2022-07-29 PROCEDURE — 1159F PR MEDICATION LIST DOCUMENTED IN MEDICAL RECORD: ICD-10-PCS | Mod: CPTII,S$GLB,, | Performed by: EMERGENCY MEDICINE

## 2022-07-29 RX ORDER — FLUTICASONE PROPIONATE 50 MCG
2 SPRAY, SUSPENSION (ML) NASAL DAILY
Qty: 16 G | Refills: 0 | Status: SHIPPED | OUTPATIENT
Start: 2022-07-29 | End: 2022-07-29 | Stop reason: SDUPTHER

## 2022-07-29 RX ORDER — BUTALBITAL, ACETAMINOPHEN AND CAFFEINE 50; 325; 40 MG/1; MG/1; MG/1
1 TABLET ORAL EVERY 4 HOURS PRN
COMMUNITY
Start: 2022-06-22 | End: 2022-08-26 | Stop reason: SDUPTHER

## 2022-07-29 RX ORDER — FLUTICASONE PROPIONATE 50 MCG
2 SPRAY, SUSPENSION (ML) NASAL DAILY
Qty: 16 G | Refills: 0 | Status: SHIPPED | OUTPATIENT
Start: 2022-07-29 | End: 2023-07-24 | Stop reason: ALTCHOICE

## 2022-07-29 RX ORDER — AMLODIPINE BESYLATE 10 MG/1
10 TABLET ORAL EVERY MORNING
COMMUNITY
Start: 2022-06-23 | End: 2023-01-05

## 2022-07-29 RX ORDER — POLYETHYLENE GLYCOL 3350 17 G/17G
17 POWDER, FOR SOLUTION ORAL 2 TIMES DAILY
COMMUNITY
Start: 2022-06-09 | End: 2023-07-24 | Stop reason: CLARIF

## 2022-07-29 NOTE — PROGRESS NOTES
"Subjective:       Patient ID: Yolanda Betts is a 52 y.o. female.    Vitals:  height is 4' 8" (1.422 m) and weight is 79.6 kg (175 lb 9.5 oz). Her oral temperature is 98.2 °F (36.8 °C). Her blood pressure is 141/65 (abnormal) and her pulse is 63. Her respiration is 18 and oxygen saturation is 99%.     Chief Complaint: Nasal Congestion (Pt stated she has congestion.)    Pt present with congestion x1 week. Pt has been vaccinated since 03/30/21. Pt denies SOB, LOC or AMS at this time.  Taking Claritin without relief of symptoms.  Notes the congestion is worse at night.  Denies fever, chills, body aches.    Sinus Problem  This is a new problem. The current episode started 1 to 4 weeks ago. The problem is unchanged. There has been no fever. Her pain is at a severity of 8/10. The pain is severe. Associated symptoms include congestion, sinus pressure and swollen glands. Pertinent negatives include no chills, coughing, diaphoresis, ear pain, headaches, hoarse voice, neck pain, shortness of breath, sneezing or sore throat. Past treatments include oral decongestants. The treatment provided no relief.       Constitution: Negative for chills, sweating and fever.   HENT: Positive for congestion and sinus pressure. Negative for ear pain and sore throat.    Neck: Negative for neck pain.   Respiratory: Negative for cough and shortness of breath.    Allergic/Immunologic: Negative for sneezing.   Neurological: Negative for headaches.       Objective:      Physical Exam   Constitutional: She is oriented to person, place, and time. She appears well-developed. She is cooperative.  Non-toxic appearance. She does not appear ill. No distress.   HENT:   Head: Normocephalic and atraumatic.   Ears:   Right Ear: Hearing and external ear normal.   Left Ear: Hearing and external ear normal.   Nose: Congestion present. No mucosal edema, rhinorrhea or nasal deformity. No epistaxis. Right sinus exhibits no maxillary sinus tenderness and no " frontal sinus tenderness. Left sinus exhibits no maxillary sinus tenderness and no frontal sinus tenderness.   Mouth/Throat: Uvula is midline and mucous membranes are normal. No trismus in the jaw. Normal dentition. No uvula swelling. Posterior oropharyngeal erythema present. No oropharyngeal exudate or posterior oropharyngeal edema.   Eyes: Conjunctivae and lids are normal. No scleral icterus. Extraocular movement intact   Neck: Trachea normal and phonation normal. Neck supple. No edema present. No erythema present. No neck rigidity present.   Cardiovascular: Normal rate, regular rhythm, normal heart sounds and normal pulses.   Pulmonary/Chest: Effort normal and breath sounds normal. No respiratory distress. She has no decreased breath sounds. She has no rhonchi.   Abdominal: Normal appearance.   Musculoskeletal: Normal range of motion.         General: No deformity. Normal range of motion.   Neurological: She is alert and oriented to person, place, and time. She exhibits normal muscle tone. Coordination normal.   Skin: Skin is warm, dry, intact, not diaphoretic and not pale.   Psychiatric: Her speech is normal and behavior is normal. Judgment and thought content normal.   Nursing note and vitals reviewed.        Assessment:       1. Upper respiratory tract infection, unspecified type    2. Sinus congestion        Results for orders placed or performed in visit on 07/29/22   POCT COVID-19 Rapid Screening   Result Value Ref Range    POC Rapid COVID Negative Negative     Acceptable Yes      *Note: Due to a large number of results and/or encounters for the requested time period, some results have not been displayed. A complete set of results can be found in Results Review.       Plan:     reviewed nasal toilet with patient for symptom control.  She appears to have some post viral nasal congestion    Upper respiratory tract infection, unspecified type  -     Discontinue: fluticasone propionate (FLONASE)  50 mcg/actuation nasal spray; 2 sprays (100 mcg total) by Each Nostril route once daily.  Dispense: 16 g; Refill: 0  -     fluticasone propionate (FLONASE) 50 mcg/actuation nasal spray; 2 sprays (100 mcg total) by Each Nostril route once daily.  Dispense: 16 g; Refill: 0    Sinus congestion  -     POCT COVID-19 Rapid Screening

## 2022-07-29 NOTE — PATIENT INSTRUCTIONS
Use over the counter(OTC) antihistamine like claritin or zyrtec daily.  Flonase: 2 sprays per nostril 1-2 times a day.   Nasal saline drops: 2-4 drops per nostril 10-15 times a day.     Tylenol or Motrin for fever or pain per label instructions.  Consider use of OTC cough medicine like Robitussin DM.  Warm saltwater gargles to 3 times a day.    Rest and drink plenty of fluids.  Avoid OTC decongestants if you have high blood pressure. This includes multi-cold symptom preparations.    Consider permissive fever to allow your immune system to work.  However, if fever is not tolerable or is disrupting sleep, use Tylenol or Motrin per label instructions.    You are considered contagious until your systemic, or whole body, symptoms have resolved fully for 24 hours.  This includes fever, body aches, fatigue.    Follow up in 2-3 days with PCP if no improvement or any worsening.       Viral Upper Respiratory Infection Discharge Instructions, Adult   About this topic   You have an upper respiratory infection or URI. A URI can affect your nose, throat, ears, and sinuses. A virus is the cause of almost all URIs and antibiotics will not help you feel better more quickly. The common cold is an example of a viral URI.  URIs are easy to spread from person to person, most often through coughing or sneezing. A URI will almost always get better in a week or two without any treatment.         What care is needed at home?   Ask your doctor what you need to do when you go home. Make sure you ask questions if you do not understand what the doctor says.  If you smoke, try to quit. Your doctor or nurse can help.  Drink lots of fluids like water, juice, or broth. This will help replace any fluids lost if you have a runny nose or fever. Warm tea or soup can help soothe a sore throat.  If the air in your home feels dry, use a cool mist humidifier. This can help a stuffy nose and make it easier to breathe.  You can also use saline nose drops to  relieve stuffiness.  If you decide to take over-the-counter cough or cold medicines, follow the directions on the label carefully. Be sure you do not take more than 1 medicine that contains acetaminophen. Also, if you have a heart problem or high blood pressure, check with your doctor before you take any of these medicines.  Wash your hands often. Cough or sneeze into a tissue or your elbow instead of your hands. This will help keep others healthy.  What follow-up care is needed?   Your doctor may ask you to make visits to the office to check on your progress. Be sure to keep these visits.  What drugs may be needed?   The doctor may order drugs to:  Open up the tubes of your lungs  Treat viral infection  Relieve or stop coughing  Help with pain from a sore throat  Relieve runny and stuffy nose  Provide oxygen  Will physical activity be limited?   You need to rest for a few days to let your body recover from the infection.  What changes to diet are needed?   Eat soft foods like soup if swallowing is too painful.  What problems could happen?   Asthma attack  Sinus infections  Lung problems like pneumonia and bronchitis  Severe fluid loss. This is dehydration.  What can be done to prevent this health problem?   Wash your hands often with soap and water for at least 20 seconds, especially after coughing or sneezing. Alcohol-based hand sanitizers also work to kill the virus.  If you are sick, cover your mouth and nose with tissue when you cough or sneeze. You can also cough into your elbow. Throw away tissues in the trash and wash your hands after touching used tissues.  Do not get too close (kissing, hugging) to people who are sick.  Do not share towels or hankies with anyone who is sick. Clean commonly handled things like door handles, remotes, toys, and phones. Wipe them with a disinfectant.  Stay away from crowded places.  Cover your nose and mouth when you sneeze or cough.  Take vitamin C to help build up your  body's ability to fight disease.  Get a flu shot each year.  When do I need to call the doctor?   You have trouble breathing when talking or sitting still.  You have a fever of 100.4°F (38°C) or higher for several days, chills, a very bad sore throat, or ear or sinus pain.  You develop a new fever after several days of feeling the same or improving.  You develop chest pain when you cough.  You have a cough that lasts more than 10 days.  You cough up blood, or the color of the mucus you cough up changes.  Teach Back: Helping You Understand   The Teach Back Method helps you understand the information we are giving you. After you talk with the staff, tell them in your own words what you learned. This helps to make sure the staff has described each thing clearly. It also helps to explain things that may have been confusing. Before going home, make sure you can do these:  I can tell you about my condition.  I can tell you what may help ease my signs.  I can tell you what I will do if I have a fever, chills, breathing very fast, or trouble breathing.  Where can I learn more?   American Lung Association  https://www.lung.org/blog/can-you-exercise-with-a-cold   American Lung Association  https://www.lung.org/lung-health-diseases/lung-disease-lookup/influenza/facts-about-the-common-cold   NHS Choices  https://www.nhs.uk/conditions/respiratory-tract-infection/   UpToDate  https://www.Quotefishdate.com/contents/the-common-cold-in-adults-beyond-the-basics   Last Reviewed Date   2021-06-08  Consumer Information Use and Disclaimer   This information is not specific medical advice and does not replace information you receive from your health care provider. This is only a brief summary of general information. It does NOT include all information about conditions, illnesses, injuries, tests, procedures, treatments, therapies, discharge instructions or life-style choices that may apply to you. You must talk with your health care provider  for complete information about your health and treatment options. This information should not be used to decide whether or not to accept your health care providers advice, instructions or recommendations. Only your health care provider has the knowledge and training to provide advice that is right for you.  Copyright   Copyright © 2021 [employer] and its affiliates and/or licensors. All rights reserved.

## 2022-08-01 ENCOUNTER — TELEPHONE (OUTPATIENT)
Dept: URGENT CARE | Facility: CLINIC | Age: 52
End: 2022-08-01
Payer: MEDICAID

## 2022-08-01 ENCOUNTER — PATIENT MESSAGE (OUTPATIENT)
Dept: PSYCHIATRY | Facility: CLINIC | Age: 52
End: 2022-08-01
Payer: MEDICAID

## 2022-08-03 ENCOUNTER — PATIENT MESSAGE (OUTPATIENT)
Dept: OTHER | Facility: OTHER | Age: 52
End: 2022-08-03
Payer: MEDICAID

## 2022-08-08 ENCOUNTER — TELEPHONE (OUTPATIENT)
Dept: PRIMARY CARE CLINIC | Facility: CLINIC | Age: 52
End: 2022-08-08
Payer: MEDICAID

## 2022-08-08 NOTE — TELEPHONE ENCOUNTER
Spoke with patient regarding symptoms to inform per provider medication suggestions such as tylenol, ibuprofen, mucinex for symptoms, and increase fluid in take. Patient states she was taking robitussin, Nyquil, her inhaler, and nasonex. Informed patient per provider those medications were ok to take as well. Patient states she has been in quarantine since 8/3/22, and inquired if she could return to work Wednesday 8/10/22. Informed patient that quarantine was 5 days, she should be able to return to work. She verbalized understanding.              ----- Message from Katerina Hrenandez sent at 8/8/2022  8:56 AM CDT -----  Contact: marj Mathew is requesting a call in regards to her testing positive for covid. Please call her back at [phone] to advise of time to quarantine and any medicine to take.           Thanks  DD

## 2022-08-09 ENCOUNTER — PATIENT MESSAGE (OUTPATIENT)
Dept: ADMINISTRATIVE | Facility: HOSPITAL | Age: 52
End: 2022-08-09
Payer: MEDICAID

## 2022-08-18 ENCOUNTER — TELEPHONE (OUTPATIENT)
Dept: PRIMARY CARE CLINIC | Facility: CLINIC | Age: 52
End: 2022-08-18
Payer: MEDICAID

## 2022-08-18 NOTE — TELEPHONE ENCOUNTER
I called the patient to inquire about B/P check, the patient states that she does not check her B/P regularly the patient has been set up for nurse visit for B/P check.

## 2022-08-22 ENCOUNTER — CLINICAL SUPPORT (OUTPATIENT)
Dept: PRIMARY CARE CLINIC | Facility: CLINIC | Age: 52
End: 2022-08-22
Payer: MEDICAID

## 2022-08-22 ENCOUNTER — TELEPHONE (OUTPATIENT)
Dept: PRIMARY CARE CLINIC | Facility: CLINIC | Age: 52
End: 2022-08-22

## 2022-08-22 VITALS — SYSTOLIC BLOOD PRESSURE: 136 MMHG | DIASTOLIC BLOOD PRESSURE: 78 MMHG

## 2022-08-22 DIAGNOSIS — I10 ESSENTIAL HYPERTENSION: Primary | ICD-10-CM

## 2022-08-22 DIAGNOSIS — R94.2 RESTRICTIVE VENTILATORY DEFECT: ICD-10-CM

## 2022-08-22 DIAGNOSIS — J45.40 MODERATE PERSISTENT ASTHMA WITHOUT COMPLICATION: ICD-10-CM

## 2022-08-22 PROCEDURE — 99999 PR PBB SHADOW E&M-EST. PATIENT-LVL II: CPT | Mod: PBBFAC,,,

## 2022-08-22 PROCEDURE — 99999 PR PBB SHADOW E&M-EST. PATIENT-LVL II: ICD-10-PCS | Mod: PBBFAC,,,

## 2022-08-22 PROCEDURE — 99212 OFFICE O/P EST SF 10 MIN: CPT | Mod: PBBFAC,PN

## 2022-08-22 RX ORDER — BUDESONIDE AND FORMOTEROL FUMARATE DIHYDRATE 160; 4.5 UG/1; UG/1
2 AEROSOL RESPIRATORY (INHALATION) EVERY 12 HOURS
Qty: 10.2 G | Refills: 6 | Status: SHIPPED | OUTPATIENT
Start: 2022-08-22 | End: 2022-08-26 | Stop reason: SDUPTHER

## 2022-08-22 NOTE — TELEPHONE ENCOUNTER
I called the patient to inquire about the reason that she was seen in Urgent Care and she states that she could be a  Possibly she need a ENT referral.Patient has been scheduled for next available with Rose PEREZ.

## 2022-08-22 NOTE — TELEPHONE ENCOUNTER
----- Message from Kemi Mcgee sent at 8/22/2022 11:31 AM CDT -----  Regarding: Sooner appointment  Contact: Patient  .Type:  Sooner Apoointment Request    Caller is requesting a sooner appointment.  Caller declined first available appointment listed below.  Caller will not accept being placed on the waitlist and is requesting a message be sent to doctor.  Name of Caller: Patient  When is the first available appointment? 10/17/22 ( [place])  Symptoms: Urgent Care f/u  Would the patient rather a call back or a response via [facility]? Call  Best Call Back Number: .[phone]  Additional Information:

## 2022-08-24 DIAGNOSIS — J45.909 ASTHMA, UNSPECIFIED ASTHMA SEVERITY, UNSPECIFIED WHETHER COMPLICATED, UNSPECIFIED WHETHER PERSISTENT: Primary | ICD-10-CM

## 2022-08-25 RX ORDER — ALBUTEROL SULFATE 90 UG/1
AEROSOL, METERED RESPIRATORY (INHALATION)
Qty: 8.5 G | Refills: 3 | Status: SHIPPED | OUTPATIENT
Start: 2022-08-25 | End: 2022-08-26 | Stop reason: SDUPTHER

## 2022-08-26 ENCOUNTER — PATIENT OUTREACH (OUTPATIENT)
Dept: ADMINISTRATIVE | Facility: OTHER | Age: 52
End: 2022-08-26
Payer: MEDICAID

## 2022-08-26 ENCOUNTER — OFFICE VISIT (OUTPATIENT)
Dept: PRIMARY CARE CLINIC | Facility: CLINIC | Age: 52
End: 2022-08-26
Payer: MEDICAID

## 2022-08-26 ENCOUNTER — TELEPHONE (OUTPATIENT)
Dept: PRIMARY CARE CLINIC | Facility: CLINIC | Age: 52
End: 2022-08-26
Payer: MEDICAID

## 2022-08-26 VITALS
TEMPERATURE: 98 F | OXYGEN SATURATION: 98 % | BODY MASS INDEX: 40.67 KG/M2 | WEIGHT: 180.81 LBS | DIASTOLIC BLOOD PRESSURE: 84 MMHG | SYSTOLIC BLOOD PRESSURE: 134 MMHG | HEIGHT: 56 IN | HEART RATE: 72 BPM

## 2022-08-26 DIAGNOSIS — E11.69 DYSLIPIDEMIA ASSOCIATED WITH TYPE 2 DIABETES MELLITUS: ICD-10-CM

## 2022-08-26 DIAGNOSIS — I10 ESSENTIAL HYPERTENSION: ICD-10-CM

## 2022-08-26 DIAGNOSIS — M25.512 CHRONIC PAIN OF BOTH SHOULDERS: ICD-10-CM

## 2022-08-26 DIAGNOSIS — M47.816 LUMBAR SPONDYLOSIS: ICD-10-CM

## 2022-08-26 DIAGNOSIS — J45.909 ASTHMA, UNSPECIFIED ASTHMA SEVERITY, UNSPECIFIED WHETHER COMPLICATED, UNSPECIFIED WHETHER PERSISTENT: ICD-10-CM

## 2022-08-26 DIAGNOSIS — R94.2 RESTRICTIVE VENTILATORY DEFECT: ICD-10-CM

## 2022-08-26 DIAGNOSIS — G89.29 CHRONIC PAIN OF BOTH SHOULDERS: ICD-10-CM

## 2022-08-26 DIAGNOSIS — E78.2 MIXED HYPERLIPIDEMIA: ICD-10-CM

## 2022-08-26 DIAGNOSIS — E55.9 VITAMIN D DEFICIENCY: ICD-10-CM

## 2022-08-26 DIAGNOSIS — M25.511 CHRONIC PAIN OF BOTH SHOULDERS: ICD-10-CM

## 2022-08-26 DIAGNOSIS — Z13.31 POSITIVE DEPRESSION SCREENING: Primary | ICD-10-CM

## 2022-08-26 DIAGNOSIS — E11.65 TYPE 2 DIABETES MELLITUS WITH HYPERGLYCEMIA, WITH LONG-TERM CURRENT USE OF INSULIN: ICD-10-CM

## 2022-08-26 DIAGNOSIS — E78.5 DYSLIPIDEMIA ASSOCIATED WITH TYPE 2 DIABETES MELLITUS: ICD-10-CM

## 2022-08-26 DIAGNOSIS — Z79.4 TYPE 2 DIABETES MELLITUS WITH HYPERGLYCEMIA, WITH LONG-TERM CURRENT USE OF INSULIN: ICD-10-CM

## 2022-08-26 DIAGNOSIS — J45.40 MODERATE PERSISTENT ASTHMA WITHOUT COMPLICATION: ICD-10-CM

## 2022-08-26 DIAGNOSIS — E83.42 HYPOMAGNESEMIA: ICD-10-CM

## 2022-08-26 DIAGNOSIS — E78.1 HYPERTRIGLYCERIDEMIA: ICD-10-CM

## 2022-08-26 PROCEDURE — 3008F PR BODY MASS INDEX (BMI) DOCUMENTED: ICD-10-PCS | Mod: CPTII,,, | Performed by: NURSE PRACTITIONER

## 2022-08-26 PROCEDURE — 3066F PR DOCUMENTATION OF TREATMENT FOR NEPHROPATHY: ICD-10-PCS | Mod: CPTII,,, | Performed by: NURSE PRACTITIONER

## 2022-08-26 PROCEDURE — 99215 OFFICE O/P EST HI 40 MIN: CPT | Mod: PBBFAC,PN | Performed by: NURSE PRACTITIONER

## 2022-08-26 PROCEDURE — 3066F NEPHROPATHY DOC TX: CPT | Mod: CPTII,,, | Performed by: NURSE PRACTITIONER

## 2022-08-26 PROCEDURE — 99214 OFFICE O/P EST MOD 30 MIN: CPT | Mod: S$PBB,,, | Performed by: NURSE PRACTITIONER

## 2022-08-26 PROCEDURE — 4010F PR ACE/ARB THEARPY RXD/TAKEN: ICD-10-PCS | Mod: CPTII,,, | Performed by: NURSE PRACTITIONER

## 2022-08-26 PROCEDURE — 3061F PR NEG MICROALBUMINURIA RESULT DOCUMENTED/REVIEW: ICD-10-PCS | Mod: CPTII,,, | Performed by: NURSE PRACTITIONER

## 2022-08-26 PROCEDURE — 3079F DIAST BP 80-89 MM HG: CPT | Mod: CPTII,,, | Performed by: NURSE PRACTITIONER

## 2022-08-26 PROCEDURE — 3044F HG A1C LEVEL LT 7.0%: CPT | Mod: CPTII,,, | Performed by: NURSE PRACTITIONER

## 2022-08-26 PROCEDURE — 3061F NEG MICROALBUMINURIA REV: CPT | Mod: CPTII,,, | Performed by: NURSE PRACTITIONER

## 2022-08-26 PROCEDURE — 3079F PR MOST RECENT DIASTOLIC BLOOD PRESSURE 80-89 MM HG: ICD-10-PCS | Mod: CPTII,,, | Performed by: NURSE PRACTITIONER

## 2022-08-26 PROCEDURE — 3075F SYST BP GE 130 - 139MM HG: CPT | Mod: CPTII,,, | Performed by: NURSE PRACTITIONER

## 2022-08-26 PROCEDURE — 1159F PR MEDICATION LIST DOCUMENTED IN MEDICAL RECORD: ICD-10-PCS | Mod: CPTII,,, | Performed by: NURSE PRACTITIONER

## 2022-08-26 PROCEDURE — 4010F ACE/ARB THERAPY RXD/TAKEN: CPT | Mod: CPTII,,, | Performed by: NURSE PRACTITIONER

## 2022-08-26 PROCEDURE — 1159F MED LIST DOCD IN RCRD: CPT | Mod: CPTII,,, | Performed by: NURSE PRACTITIONER

## 2022-08-26 PROCEDURE — 3072F PR LOW RISK FOR RETINOPATHY: ICD-10-PCS | Mod: CPTII,,, | Performed by: NURSE PRACTITIONER

## 2022-08-26 PROCEDURE — 3075F PR MOST RECENT SYSTOLIC BLOOD PRESS GE 130-139MM HG: ICD-10-PCS | Mod: CPTII,,, | Performed by: NURSE PRACTITIONER

## 2022-08-26 PROCEDURE — 99999 PR PBB SHADOW E&M-EST. PATIENT-LVL V: CPT | Mod: PBBFAC,,, | Performed by: NURSE PRACTITIONER

## 2022-08-26 PROCEDURE — 99999 PR PBB SHADOW E&M-EST. PATIENT-LVL V: ICD-10-PCS | Mod: PBBFAC,,, | Performed by: NURSE PRACTITIONER

## 2022-08-26 PROCEDURE — 3072F LOW RISK FOR RETINOPATHY: CPT | Mod: CPTII,,, | Performed by: NURSE PRACTITIONER

## 2022-08-26 PROCEDURE — 99214 PR OFFICE/OUTPT VISIT, EST, LEVL IV, 30-39 MIN: ICD-10-PCS | Mod: S$PBB,,, | Performed by: NURSE PRACTITIONER

## 2022-08-26 PROCEDURE — 3044F PR MOST RECENT HEMOGLOBIN A1C LEVEL <7.0%: ICD-10-PCS | Mod: CPTII,,, | Performed by: NURSE PRACTITIONER

## 2022-08-26 PROCEDURE — 3008F BODY MASS INDEX DOCD: CPT | Mod: CPTII,,, | Performed by: NURSE PRACTITIONER

## 2022-08-26 RX ORDER — METOPROLOL SUCCINATE 50 MG/1
50 TABLET, EXTENDED RELEASE ORAL NIGHTLY
Qty: 90 TABLET | Refills: 0 | Status: SHIPPED | OUTPATIENT
Start: 2022-08-26 | End: 2022-12-22 | Stop reason: SDUPTHER

## 2022-08-26 RX ORDER — BUTALBITAL, ACETAMINOPHEN AND CAFFEINE 50; 325; 40 MG/1; MG/1; MG/1
1 TABLET ORAL EVERY 4 HOURS PRN
Qty: 30 TABLET | Refills: 3 | Status: SHIPPED | OUTPATIENT
Start: 2022-08-26 | End: 2022-09-25

## 2022-08-26 RX ORDER — BUDESONIDE AND FORMOTEROL FUMARATE DIHYDRATE 160; 4.5 UG/1; UG/1
2 AEROSOL RESPIRATORY (INHALATION) EVERY 12 HOURS
Qty: 10.2 G | Refills: 6 | Status: SHIPPED | OUTPATIENT
Start: 2022-08-26 | End: 2023-02-01 | Stop reason: SDUPTHER

## 2022-08-26 RX ORDER — ERGOCALCIFEROL 1.25 MG/1
CAPSULE ORAL
Qty: 12 CAPSULE | Refills: 3 | Status: SHIPPED | OUTPATIENT
Start: 2022-08-26 | End: 2023-02-01 | Stop reason: SDUPTHER

## 2022-08-26 RX ORDER — ALBUTEROL SULFATE 90 UG/1
AEROSOL, METERED RESPIRATORY (INHALATION)
Qty: 8.5 G | Refills: 3 | Status: SHIPPED | OUTPATIENT
Start: 2022-08-26 | End: 2023-06-13

## 2022-08-26 RX ORDER — LANOLIN ALCOHOL/MO/W.PET/CERES
400 CREAM (GRAM) TOPICAL DAILY
Qty: 90 TABLET | Refills: 0 | Status: SHIPPED | OUTPATIENT
Start: 2022-08-26 | End: 2022-11-24

## 2022-08-26 RX ORDER — INSULIN GLARGINE 300 U/ML
86 INJECTION, SOLUTION SUBCUTANEOUS DAILY
Qty: 9 PEN | Refills: 3 | Status: SHIPPED | OUTPATIENT
Start: 2022-08-26 | End: 2022-09-28 | Stop reason: SDUPTHER

## 2022-08-26 RX ORDER — CHOLECALCIFEROL (VITAMIN D3) 10 MCG
1 CAPSULE ORAL DAILY
Qty: 30 TABLET | Refills: 3 | Status: SHIPPED | OUTPATIENT
Start: 2022-08-26 | End: 2022-09-25

## 2022-08-26 RX ORDER — PEN NEEDLE, DIABETIC 30 GX3/16"
NEEDLE, DISPOSABLE MISCELLANEOUS
Qty: 200 EACH | Refills: 11 | Status: SHIPPED | OUTPATIENT
Start: 2022-08-26

## 2022-08-26 RX ORDER — INSULIN ASPART 100 [IU]/ML
10 INJECTION, SOLUTION INTRAVENOUS; SUBCUTANEOUS
Qty: 9 ML | Refills: 3 | Status: SHIPPED | OUTPATIENT
Start: 2022-08-26 | End: 2022-09-28 | Stop reason: SDUPTHER

## 2022-08-26 RX ORDER — METFORMIN HYDROCHLORIDE 1000 MG/1
1000 TABLET ORAL 2 TIMES DAILY WITH MEALS
Qty: 180 TABLET | Refills: 0 | Status: SHIPPED | OUTPATIENT
Start: 2022-08-26 | End: 2022-12-07 | Stop reason: SDUPTHER

## 2022-08-26 RX ORDER — LIDOCAINE AND PRILOCAINE 25; 25 MG/G; MG/G
CREAM TOPICAL
Qty: 60 G | Refills: 0 | Status: SHIPPED | OUTPATIENT
Start: 2022-08-26 | End: 2023-09-12 | Stop reason: ALTCHOICE

## 2022-08-26 RX ORDER — FENOFIBRATE 145 MG/1
145 TABLET, FILM COATED ORAL DAILY
Qty: 90 TABLET | Refills: 1 | Status: SHIPPED | OUTPATIENT
Start: 2022-08-26 | End: 2022-12-22 | Stop reason: SDUPTHER

## 2022-08-26 RX ORDER — AMLODIPINE AND VALSARTAN 10; 320 MG/1; MG/1
1 TABLET ORAL DAILY
Qty: 90 TABLET | Refills: 1 | Status: SHIPPED | OUTPATIENT
Start: 2022-08-26 | End: 2022-12-22 | Stop reason: SDUPTHER

## 2022-08-26 RX ORDER — ATORVASTATIN CALCIUM 20 MG/1
20 TABLET, FILM COATED ORAL NIGHTLY
Qty: 90 TABLET | Refills: 3 | Status: SHIPPED | OUTPATIENT
Start: 2022-08-26 | End: 2022-12-22 | Stop reason: SDUPTHER

## 2022-08-26 RX ORDER — BLOOD-GLUCOSE CONTROL, NORMAL
EACH MISCELLANEOUS
Qty: 100 EACH | Refills: 11 | Status: SHIPPED | OUTPATIENT
Start: 2022-09-19 | End: 2024-02-21

## 2022-08-26 NOTE — PROGRESS NOTES
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    Pt identified barriers of most importance are: Please see SDOH icons.  Referrals to community agencies completed with patient consent outside of [facility] include: Yes. Housing/ Rental [phone], [phone], Energy assistance [phone]  Referrals were put through [facility] - No   Support and Services: Housing/ Rental [phone], [facility] [phone], Energy assistance [phone]  Other information discussed the patient needs help with: All information shared is in the note. Pt shared no other information at this time.  Follow up required: Yes  Follow-up Outreach - Due: 9/9/2022

## 2022-08-26 NOTE — PROGRESS NOTES
Subjective:       Patient ID: Yolanda Betts is a 52 y.o. female.    Chief Complaint: Follow-up (Upper resp. symptoms)      History of Present Illness:   Yolanda Betts 52 y.o. female presents today with reports of cough, chest congestion and was recently dx with COVID on 8/5/2022 but is still having issues. Patient was seen in Urgent Care and prescribed Phenergan, Doxycycline, Oral steroids, flonase and OTC allergy medication. Patient reports feeling fatigued. Additionally, she has a document she would like to have filled out with the Department of Children and Family Services to become a foster mother. Denies any other problems or concerns at this time.     Past Medical History:   Diagnosis Date    Abnormal Pap smear of cervix     HPV genital warts    Anemia     Anxiety     Arthritis     Asthma 10/11/2016    Bipolar 1 disorder     Diabetes mellitus, type 2     Dyslipidemia associated with type 2 diabetes mellitus 5/13/2019    General anesthetics causing adverse effect in therapeutic use     Genital warts     GERD (gastroesophageal reflux disease)     Herpes simplex virus (HSV) infection     Hyperlipidemia     Hypertension     Hypertension complicating diabetes 5/5/2019    Migraine with aura and without status migrainosus, not intractable 3/21/2016    Mild persistent asthma without complication 10/11/2016    Morbid obesity with body mass index (BMI) of 45.0 to 49.9 in adult 8/3/2017    Obstructive sleep apnea     ALEN (obstructive sleep apnea)     2L per N/C q HS    Schizoaffective disorder, bipolar type 4/25/2019    Seasonal allergic rhinitis due to pollen 5/13/2019    Type 2 diabetes mellitus with hyperglycemia, with long-term current use of insulin 12/21/2017    Type 2 diabetes mellitus with hyperglycemia, without long-term current use of insulin 12/21/2017     Family History   Problem Relation Age of Onset    Diabetes Mother     Hyperlipidemia Mother     Hypertension Mother     Asthma Mother     COPD  "Mother     Glaucoma Mother     Thyroid disease Mother     Anesthesia problems Mother         "almost had a cardiac arrest" , blood clots    Hypertension Father     Hyperlipidemia Father     Cancer Father         Brain, lung, liver, kidney    Heart disease Maternal Grandmother     Hyperlipidemia Maternal Grandmother     Hypertension Maternal Grandmother     Cataracts Maternal Grandmother     Diabetes Maternal Grandmother     Heart disease Maternal Grandfather     Hyperlipidemia Maternal Grandfather     Hypertension Maternal Grandfather     Glaucoma Maternal Grandfather     Cancer Maternal Grandfather     Cataracts Maternal Grandfather     Macular degeneration Maternal Grandfather     Diabetes Maternal Grandfather     Heart disease Paternal Grandmother     Hyperlipidemia Paternal Grandmother     Hypertension Paternal Grandmother     Cataracts Paternal Grandmother     Heart disease Paternal Grandfather     Hyperlipidemia Paternal Grandfather     Hypertension Paternal Grandfather     Cataracts Paternal Grandfather     Breast cancer Maternal Cousin     Breast cancer Maternal Cousin     Breast cancer Maternal Cousin     Breast cancer Maternal Cousin      Social History     Socioeconomic History    Marital status: Single   Tobacco Use    Smoking status: Never    Smokeless tobacco: Never   Substance and Sexual Activity    Alcohol use: Yes     Alcohol/week: 0.0 standard drinks     Comment: socially  No alcohol 72h prior to sx    Drug use: No    Sexual activity: Yes     Partners: Male     Birth control/protection: Surgical     Comment: john   Social History Narrative    Long-term care nurse     Social Determinants of Health     Financial Resource Strain: High Risk    Difficulty of Paying Living Expenses: Hard   Food Insecurity: Food Insecurity Present    Worried About Running Out of Food in the Last Year: Often true    Ran Out of Food in the Last Year: Often true   Transportation Needs: No Transportation Needs    Lack of " "Transportation (Medical): No    Lack of Transportation (Non-Medical): No   Physical Activity: Inactive    Days of Exercise per Week: 0 days    Minutes of Exercise per Session: 0 min   Stress: Stress Concern Present    Feeling of Stress : Rather much   Social Connections: Moderately Isolated    Frequency of Communication with Friends and Family: Twice a week    Frequency of Social Gatherings with Friends and Family: Twice a week    Attends Zoroastrian Services: More than 4 times per year    Active Member of Clubs or Organizations: No    Attends Club or Organization Meetings: Never    Marital Status: Never    Housing Stability: High Risk    Unable to Pay for Housing in the Last Year: Yes    Number of Places Lived in the Last Year: 1    Unstable Housing in the Last Year: No     Outpatient Encounter Medications as of 8/26/2022   Medication Sig Dispense Refill    aspirin (ECOTRIN) 81 MG EC tablet Take 81 mg by mouth once daily.      BD INSULIN SYRINGE ULTRA-FINE 1/2 mL 30 gauge x 1/2" Syrg   0    blood sugar diagnostic (ONETOUCH ULTRA TEST) Strp Check blood glucose 4 times daily as directed and as needed (dispense insurance preferred brand or patient choice) 200 each 5    blood sugar diagnostic Strp 1 each by Misc.(Non-Drug; Combo Route) route 4 (four) times daily. [facility] Glucose Test Strips 400 strip 3    blood-glucose meter Misc Use as directed (Patient taking differently: Use as directed) 1 each 0    cycloSPORINE 0.05 % Drop Place 1 drop into both eyes 2 (two) times daily. 5.5 mL 11    DULoxetine (CYMBALTA) 60 MG capsule Take 1 capsule (60 mg total) by mouth once daily. 30 capsule 2    flash glucose scanning reader ([email or web address] AMY 2 READER) Misc Use as directed to check blood sugar 1 each 1    fluticasone propionate (FLONASE) 50 mcg/actuation nasal spray 2 sprays (100 mcg total) by Each Nostril route once daily. 16 g 0    furosemide (LASIX) 20 MG tablet Take 1 tablet (20 mg total) by mouth once daily. 30 tablet " "11    lurasidone (LATUDA) 60 mg Tab tablet Take 1 tablet by mouth daily with 350 calories 30 tablet 2    pen needle, diabetic (BD ULTRA-FINE MINI PEN NEEDLE) 31 gauge x 3/16" Ndle Use 5 a day 200 each 3    plecanatide (TRULANCE) 3 mg Tab Take 3 mg by mouth once daily. 90 tablet 0    spironolactone (ALDACTONE) 25 MG tablet Take 1 tablet (25 mg total) by mouth once daily. 30 tablet 6    [DISCONTINUED] albuterol (PROVENTIL/VENTOLIN HFA) 90 mcg/actuation inhaler Inhale 2 puffs into the lungs every 6 hours as needed for wheezing 8.5 g 3    [DISCONTINUED] amlodipine-valsartan (EXFORGE)  mg per tablet Take 1 tablet by mouth once daily. 90 tablet 1    [DISCONTINUED] atorvastatin (LIPITOR) 20 MG tablet Take 1 tablet (20 mg total) by mouth every evening. 90 tablet 3    [DISCONTINUED] budesonide-formoterol 160-4.5 mcg (SYMBICORT) 160-4.5 mcg/actuation HFAA Inhale 2 puffs into the lungs every 12 (twelve) hours. Controller : Use spacer 10.2 g 6    [DISCONTINUED] butalbital-acetaminophen-caffeine -40 mg (FIORICET, ESGIC) -40 mg per tablet Take 1 tablet by mouth every 4 (four) hours as needed.      [DISCONTINUED] fenofibrate (TRICOR) 145 MG tablet Take 1 tablet (145 mg total) by mouth once daily. 90 tablet 1    [DISCONTINUED] fish oil-omega-3 fatty acids 300-1,000 mg capsule Take 1 capsule by mouth once daily.      [DISCONTINUED] insulin aspart U-100 (NOVOLOG FLEXPEN U-100 INSULIN) 100 unit/mL (3 mL) InPn pen Inject 10 Units into the skin 3 (three) times daily before meals. Use based on sliding scale 15 mL 3    [DISCONTINUED] insulin glargine U-300 conc (TOUJEO MAX U-300 SOLOSTAR) 300 unit/mL (3 mL) insulin pen Inject 86 Units into the skin once daily. 4 pen 1    [DISCONTINUED] lancets (ONETOUCH DELICA PLUS LANCET) 30 gauge Misc Use as directed 3 times daily (Patient taking differently: Use as directed 3 times daily) 100 each 11    [DISCONTINUED] LIDOcaine-prilocaine (EMLA) cream Apply topically up to 4 times per " "day to affected area for pain relief 60 g 0    [DISCONTINUED] magnesium oxide (MAG-OX) 400 mg (241.3 mg magnesium) tablet Take 1 tablet (400 mg total) by mouth once daily. 90 tablet 3    [DISCONTINUED] metFORMIN (GLUCOPHAGE) 1000 MG tablet Take 1 tablet (1,000 mg total) by mouth 2 (two) times daily with meals. 180 tablet 0    [DISCONTINUED] metoprolol succinate (TOPROL-XL) 50 MG 24 hr tablet Take 1 tablet (50 mg total) by mouth every evening. 90 tablet 0    [DISCONTINUED] MULTIVIT,CALC,MINS/IRON/FOLIC (DAILY MULTIPLE FOR WOMEN ORAL) Take 1 tablet by mouth once daily.      [DISCONTINUED] pen needle, diabetic (BD ULTRA-FINE MINI PEN NEEDLE) 31 gauge x 3/16" Ndle Use one needle 5 times a day (Patient taking differently: Use one needle 5 times a day) 200 each 3    albuterol (PROVENTIL/VENTOLIN HFA) 90 mcg/actuation inhaler Inhale 2 puffs into the lungs every 6 hours as needed for wheezing 8.5 g 3    amLODIPine (NORVASC) 10 MG tablet Take 10 mg by mouth every morning.      amlodipine-valsartan (EXFORGE)  mg per tablet Take 1 tablet by mouth once daily. 90 tablet 1    atorvastatin (LIPITOR) 20 MG tablet Take 1 tablet (20 mg total) by mouth every evening. 90 tablet 3    budesonide-formoterol 160-4.5 mcg (SYMBICORT) 160-4.5 mcg/actuation HFAA Inhale 2 puffs into the lungs every 12 (twelve) hours. Controller : Use spacer 10.2 g 6    butalbital-acetaminophen-caffeine -40 mg (FIORICET, ESGIC) -40 mg per tablet Take 1 tablet by mouth every 4 (four) hours as needed for Headaches. 30 tablet 3    clonazePAM (KLONOPIN) 1 MG tablet Take 1 tablet (1 mg total) by mouth once daily. 30 tablet 2    diclofenac sodium (VOLTAREN) 1 % Gel Apply 2 grams topically 4 (four) times daily as needed. 5 each 0    ergocalciferol (ERGOCALCIFEROL) 50,000 unit Cap Take 1 capsule twice weekly for 3 weeks then weekly 12 capsule 3    fenofibrate (TRICOR) 145 MG tablet Take 1 tablet (145 mg total) by mouth once daily. 90 tablet 1    " "frovatriptan (FROVA) 2.5 MG tablet Take 1 tablet at onset of acute migraine. May take 1 more tablet 2 hours later if needed. (MAX 2 TABLET PER DAY. MAX 4 TABLETS PER WEEK.) (Patient not taking: Reported on 8/26/2022) 9 tablet 1    HEALTHYLAX 17 gram PwPk Take 17 g by mouth 2 (two) times daily.      insulin aspart U-100 (NOVOLOG FLEXPEN U-100 INSULIN) 100 unit/mL (3 mL) InPn pen Inject 10 Units into the skin 3 (three) times daily before meals. Use based on sliding scale 9 mL 3    insulin glargine U-300 conc (TOUJEO MAX U-300 SOLOSTAR) 300 unit/mL (3 mL) insulin pen Inject 86 Units into the skin once daily. 9 pen 3    lancets (ONETOUCH DELICA PLUS LANCET) 30 gauge Misc Use as directed 3 times daily 100 each 11    LIDOcaine-prilocaine (EMLA) cream Apply topically up to 4 times per day to affected area for pain relief 60 g 0    magnesium oxide (MAG-OX) 400 mg (241.3 mg magnesium) tablet Take 1 tablet (400 mg total) by mouth once daily. 90 tablet 0    metFORMIN (GLUCOPHAGE) 1000 MG tablet Take 1 tablet (1,000 mg total) by mouth 2 (two) times daily with meals. 180 tablet 0    metoprolol succinate (TOPROL-XL) 50 MG 24 hr tablet Take 1 tablet (50 mg total) by mouth every evening. 90 tablet 0    multivit-iron-FA-calcium-mins (DAILY MULTIPLE FOR WOMEN) 18 mg iron-400 mcg-500 mg Ca Tab Take 1 tablet by mouth once daily. 30 tablet 3    pen needle, diabetic (BD ULTRA-FINE MINI PEN NEEDLE) 31 gauge x 3/16" Ndle Use one needle 5 times a day 200 each 11    [DISCONTINUED] glucagon, human recombinant, (GLUCAGON EMERGENCY KIT, HUMAN,) 1 mg SolR Inject 1 mg into the muscle as needed. Emergency use 1 each 1    [DISCONTINUED] valsartan (DIOVAN) 320 MG tablet Take 1 tablet (320 mg total) by mouth once daily. 90 tablet 3     No facility-administered encounter medications on file as of 8/26/2022.       Review of Systems   Constitutional:  Negative for appetite change, chills and fever.   HENT:  Positive for congestion, sinus pressure and " "sore throat. Negative for ear pain and trouble swallowing.    Eyes:  Negative for visual disturbance.   Respiratory:  Positive for cough. Negative for shortness of breath.    Cardiovascular:  Negative for chest pain.   Gastrointestinal:  Negative for abdominal pain, diarrhea, nausea and vomiting.   Endocrine: Negative for cold intolerance, polyphagia and polyuria.   Genitourinary:  Negative for decreased urine volume and dysuria.   Musculoskeletal:  Negative for back pain.   Skin:  Negative for rash.   Allergic/Immunologic: Negative for environmental allergies and food allergies.   Neurological:  Negative for dizziness, tremors, weakness and numbness.   Hematological:  Does not bruise/bleed easily.   Psychiatric/Behavioral:  Negative for confusion and hallucinations. The patient is not nervous/anxious and is not hyperactive.    All other systems reviewed and are negative.    Objective:      /84 (BP Location: Left arm, Patient Position: Sitting, BP Method: Medium (Manual))   Pulse 72   Temp 98.1 °F (36.7 °C) (Temporal)   Ht 4' 8" (1.422 m)   Wt 82 kg (180 lb 12.8 oz)   SpO2 98%   BMI 40.53 kg/m²   Physical Exam  Vitals and nursing note reviewed.   Constitutional:       General: She is not in acute distress.     Appearance: Normal appearance. She is normal weight. She is not ill-appearing or toxic-appearing.   Cardiovascular:      Rate and Rhythm: Normal rate and regular rhythm.      Pulses: Normal pulses.      Heart sounds: Normal heart sounds.   Pulmonary:      Effort: Pulmonary effort is normal.      Breath sounds: Normal breath sounds.   Neurological:      Mental Status: She is alert.       Results for orders placed or performed in visit on 07/29/22   POCT COVID-19 Rapid Screening   Result Value Ref Range    POC Rapid COVID Negative Negative     Acceptable Yes      *Note: Due to a large number of results and/or encounters for the requested time period, some results have not been " "displayed. A complete set of results can be found in Results Review.     Assessment:       1. Positive depression screening    2. Asthma, unspecified asthma severity, unspecified whether complicated, unspecified whether persistent    3. Type 2 diabetes mellitus with hyperglycemia, with long-term current use of insulin    4. Vitamin D deficiency    5. Essential hypertension    6. Mixed hyperlipidemia    7. Dyslipidemia associated with type 2 diabetes mellitus    8. Moderate persistent asthma without complication    9. Restrictive ventilatory defect    10. Hypertriglyceridemia    11. Lumbar spondylosis    12. Chronic pain of both shoulders    13. Hypomagnesemia        Plan:   Positive depression screening  Comments:  I have reviewed the positive depression score which warrants active treatment with psychotherapy and/or medications.    Asthma, unspecified asthma severity, unspecified whether complicated, unspecified whether persistent  -     albuterol (PROVENTIL/VENTOLIN HFA) 90 mcg/actuation inhaler; Inhale 2 puffs into the lungs every 6 hours as needed for wheezing  Dispense: 8.5 g; Refill: 3    Type 2 diabetes mellitus with hyperglycemia, with long-term current use of insulin  -     insulin aspart U-100 (NOVOLOG FLEXPEN U-100 INSULIN) 100 unit/mL (3 mL) InPn pen; Inject 10 Units into the skin 3 (three) times daily before meals. Use based on sliding scale  Dispense: 9 mL; Refill: 3  -     insulin glargine U-300 conc (TOUJEO MAX U-300 SOLOSTAR) 300 unit/mL (3 mL) insulin pen; Inject 86 Units into the skin once daily.  Dispense: 9 pen; Refill: 3  -     lancets (ONETOUCH DELICA PLUS LANCET) 30 gauge Misc; Use as directed 3 times daily  Dispense: 100 each; Refill: 11  -     metFORMIN (GLUCOPHAGE) 1000 MG tablet; Take 1 tablet (1,000 mg total) by mouth 2 (two) times daily with meals.  Dispense: 180 tablet; Refill: 0  -     pen needle, diabetic (BD ULTRA-FINE MINI PEN NEEDLE) 31 gauge x 3/16" Ndle; Use one needle 5 times a " day  Dispense: 200 each; Refill: 11    Vitamin D deficiency  -     ergocalciferol (ERGOCALCIFEROL) 50,000 unit Cap; Take 1 capsule twice weekly for 3 weeks then weekly  Dispense: 12 capsule; Refill: 3    Essential hypertension  -     amlodipine-valsartan (EXFORGE)  mg per tablet; Take 1 tablet by mouth once daily.  Dispense: 90 tablet; Refill: 1  -     metoprolol succinate (TOPROL-XL) 50 MG 24 hr tablet; Take 1 tablet (50 mg total) by mouth every evening.  Dispense: 90 tablet; Refill: 0    Mixed hyperlipidemia    Dyslipidemia associated with type 2 diabetes mellitus  -     atorvastatin (LIPITOR) 20 MG tablet; Take 1 tablet (20 mg total) by mouth every evening.  Dispense: 90 tablet; Refill: 3    Moderate persistent asthma without complication  -     budesonide-formoterol 160-4.5 mcg (SYMBICORT) 160-4.5 mcg/actuation HFAA; Inhale 2 puffs into the lungs every 12 (twelve) hours. Controller : Use spacer  Dispense: 10.2 g; Refill: 6    Restrictive ventilatory defect  -     budesonide-formoterol 160-4.5 mcg (SYMBICORT) 160-4.5 mcg/actuation HFAA; Inhale 2 puffs into the lungs every 12 (twelve) hours. Controller : Use spacer  Dispense: 10.2 g; Refill: 6    Hypertriglyceridemia  -     fenofibrate (TRICOR) 145 MG tablet; Take 1 tablet (145 mg total) by mouth once daily.  Dispense: 90 tablet; Refill: 1    Lumbar spondylosis  -     LIDOcaine-prilocaine (EMLA) cream; Apply topically up to 4 times per day to affected area for pain relief  Dispense: 60 g; Refill: 0    Chronic pain of both shoulders  -     LIDOcaine-prilocaine (EMLA) cream; Apply topically up to 4 times per day to affected area for pain relief  Dispense: 60 g; Refill: 0    Hypomagnesemia  -     magnesium oxide (MAG-OX) 400 mg (241.3 mg magnesium) tablet; Take 1 tablet (400 mg total) by mouth once daily.  Dispense: 90 tablet; Refill: 0    Other orders  -     butalbital-acetaminophen-caffeine -40 mg (FIORICET, ESGIC) -40 mg per tablet; Take 1 tablet  by mouth every 4 (four) hours as needed for Headaches.  Dispense: 30 tablet; Refill: 3  -     multivit-iron-FA-calcium-mins (DAILY MULTIPLE FOR WOMEN) 18 mg iron-400 mcg-500 mg Ca Tab; Take 1 tablet by mouth once daily.  Dispense: 30 tablet; Refill: 3           [facility]   [address] [place] [address] [address]  [place] [address]  Office [phone]  Fax [phone]

## 2022-08-30 ENCOUNTER — PATIENT MESSAGE (OUTPATIENT)
Dept: OTHER | Facility: OTHER | Age: 52
End: 2022-08-30
Payer: MEDICAID

## 2022-09-01 ENCOUNTER — TELEPHONE (OUTPATIENT)
Dept: PRIMARY CARE CLINIC | Facility: CLINIC | Age: 52
End: 2022-09-01
Payer: MEDICAID

## 2022-09-01 NOTE — TELEPHONE ENCOUNTER
Spoke with patient and scheduled earliest available virtual at 9/7/22 at 7:00am. Advised patient to seek emergency medical attention at nearest Urgent Care for immediate reevaluation and treatment of symptoms. She verbalized understanding.

## 2022-09-06 NOTE — ASSESSMENT & PLAN NOTE
Adherence to nighttime oxygen   Z Plasty Text: The lesion was extirpated to the level of the fat with a #15 scalpel blade.  Given the location of the defect, shape of the defect and the proximity to free margins a Z-plasty was deemed most appropriate for repair.  Using a sterile surgical marker, the appropriate transposition arms of the Z-plasty were drawn incorporating the defect and placing the expected incisions within the relaxed skin tension lines where possible.    The area thus outlined was incised deep to adipose tissue with a #15 scalpel blade.  The skin margins were undermined to an appropriate distance in all directions utilizing iris scissors.  The opposing transposition arms were then transposed into place in opposite direction and anchored with interrupted buried subcutaneous sutures.

## 2022-09-07 ENCOUNTER — OFFICE VISIT (OUTPATIENT)
Dept: PRIMARY CARE CLINIC | Facility: CLINIC | Age: 52
End: 2022-09-07
Payer: MEDICAID

## 2022-09-07 DIAGNOSIS — J01.00 ACUTE NON-RECURRENT MAXILLARY SINUSITIS: Primary | ICD-10-CM

## 2022-09-07 PROCEDURE — 3066F NEPHROPATHY DOC TX: CPT | Mod: CPTII,95,, | Performed by: NURSE PRACTITIONER

## 2022-09-07 PROCEDURE — 3061F NEG MICROALBUMINURIA REV: CPT | Mod: CPTII,95,, | Performed by: NURSE PRACTITIONER

## 2022-09-07 PROCEDURE — 99214 PR OFFICE/OUTPT VISIT, EST, LEVL IV, 30-39 MIN: ICD-10-PCS | Mod: 95,,, | Performed by: NURSE PRACTITIONER

## 2022-09-07 PROCEDURE — 3061F PR NEG MICROALBUMINURIA RESULT DOCUMENTED/REVIEW: ICD-10-PCS | Mod: CPTII,95,, | Performed by: NURSE PRACTITIONER

## 2022-09-07 PROCEDURE — 4010F ACE/ARB THERAPY RXD/TAKEN: CPT | Mod: CPTII,95,, | Performed by: NURSE PRACTITIONER

## 2022-09-07 PROCEDURE — 3066F PR DOCUMENTATION OF TREATMENT FOR NEPHROPATHY: ICD-10-PCS | Mod: CPTII,95,, | Performed by: NURSE PRACTITIONER

## 2022-09-07 PROCEDURE — 3072F PR LOW RISK FOR RETINOPATHY: ICD-10-PCS | Mod: CPTII,95,, | Performed by: NURSE PRACTITIONER

## 2022-09-07 PROCEDURE — 3044F PR MOST RECENT HEMOGLOBIN A1C LEVEL <7.0%: ICD-10-PCS | Mod: CPTII,95,, | Performed by: NURSE PRACTITIONER

## 2022-09-07 PROCEDURE — 3072F LOW RISK FOR RETINOPATHY: CPT | Mod: CPTII,95,, | Performed by: NURSE PRACTITIONER

## 2022-09-07 PROCEDURE — 4010F PR ACE/ARB THEARPY RXD/TAKEN: ICD-10-PCS | Mod: CPTII,95,, | Performed by: NURSE PRACTITIONER

## 2022-09-07 PROCEDURE — 3044F HG A1C LEVEL LT 7.0%: CPT | Mod: CPTII,95,, | Performed by: NURSE PRACTITIONER

## 2022-09-07 PROCEDURE — 99214 OFFICE O/P EST MOD 30 MIN: CPT | Mod: 95,,, | Performed by: NURSE PRACTITIONER

## 2022-09-07 RX ORDER — LEVOCETIRIZINE DIHYDROCHLORIDE 5 MG/1
5 TABLET, FILM COATED ORAL NIGHTLY
Qty: 30 TABLET | Refills: 11 | Status: SHIPPED | OUTPATIENT
Start: 2022-09-07 | End: 2022-12-22 | Stop reason: SDUPTHER

## 2022-09-07 RX ORDER — AZITHROMYCIN 500 MG/1
500 TABLET, FILM COATED ORAL DAILY
Qty: 5 TABLET | Refills: 0 | Status: SHIPPED | OUTPATIENT
Start: 2022-09-07 | End: 2022-09-12

## 2022-09-07 NOTE — PROGRESS NOTES
Subjective:       Patient ID: Yolanda Betts is a 52 y.o. female.    Chief Complaint: Sinus Pressure and Tenderness  The patient location is: [place], [place]    Visit type: audiovisual    Face to Face time with patient: 11 min  12 minutes of total time spent on the encounter, which includes face to face time and non-face to face time preparing to see the patient (eg, review of tests), Obtaining and/or reviewing separately obtained history, Documenting clinical information in the electronic or other health record, Independently interpreting results (not separately reported) and communicating results to the patient/family/caregiver, or Care coordination (not separately reported).         Each patient to whom he or she provides medical services by telemedicine is:  (1) informed of the relationship between the physician and patient and the respective role of any other health care provider with respect to management of the patient; and (2) notified that he or she may decline to receive medical services by telemedicine and may withdraw from such care at any time.    Notes:      History of Present Illness:   Yolanda Betts 52 y.o. female presents today with reports of maxillary sinus pressure and tenderness that has been present for about 1.5 month. According to patient she was on round of antibiotics (doxycycline 100 mg for 7 days, Flonase and Zyrtec) but is still having issues. Recommended- azithromycin     Past Medical History:   Diagnosis Date    Abnormal Pap smear of cervix     HPV genital warts    Anemia     Anxiety     Arthritis     Asthma 10/11/2016    Bipolar 1 disorder     Diabetes mellitus, type 2     Dyslipidemia associated with type 2 diabetes mellitus 5/13/2019    General anesthetics causing adverse effect in therapeutic use     Genital warts     GERD (gastroesophageal reflux disease)     Herpes simplex virus (HSV) infection     Hyperlipidemia     Hypertension     Hypertension complicating diabetes  "5/5/2019    Migraine with aura and without status migrainosus, not intractable 3/21/2016    Mild persistent asthma without complication 10/11/2016    Morbid obesity with body mass index (BMI) of 45.0 to 49.9 in adult 8/3/2017    Obstructive sleep apnea     ALEN (obstructive sleep apnea)     2L per N/C q HS    Schizoaffective disorder, bipolar type 4/25/2019    Seasonal allergic rhinitis due to pollen 5/13/2019    Type 2 diabetes mellitus with hyperglycemia, with long-term current use of insulin 12/21/2017    Type 2 diabetes mellitus with hyperglycemia, without long-term current use of insulin 12/21/2017     Family History   Problem Relation Age of Onset    Diabetes Mother     Hyperlipidemia Mother     Hypertension Mother     Asthma Mother     COPD Mother     Glaucoma Mother     Thyroid disease Mother     Anesthesia problems Mother         "almost had a cardiac arrest" , blood clots    Hypertension Father     Hyperlipidemia Father     Cancer Father         Brain, lung, liver, kidney    Heart disease Maternal Grandmother     Hyperlipidemia Maternal Grandmother     Hypertension Maternal Grandmother     Cataracts Maternal Grandmother     Diabetes Maternal Grandmother     Heart disease Maternal Grandfather     Hyperlipidemia Maternal Grandfather     Hypertension Maternal Grandfather     Glaucoma Maternal Grandfather     Cancer Maternal Grandfather     Cataracts Maternal Grandfather     Macular degeneration Maternal Grandfather     Diabetes Maternal Grandfather     Heart disease Paternal Grandmother     Hyperlipidemia Paternal Grandmother     Hypertension Paternal Grandmother     Cataracts Paternal Grandmother     Heart disease Paternal Grandfather     Hyperlipidemia Paternal Grandfather     Hypertension Paternal Grandfather     Cataracts Paternal Grandfather     Breast cancer Maternal Cousin     Breast cancer Maternal Cousin     Breast cancer Maternal Cousin     Breast cancer Maternal Cousin      Social History "     Socioeconomic History    Marital status: Single   Tobacco Use    Smoking status: Never    Smokeless tobacco: Never   Substance and Sexual Activity    Alcohol use: Yes     Alcohol/week: 0.0 standard drinks     Comment: socially  No alcohol 72h prior to sx    Drug use: No    Sexual activity: Yes     Partners: Male     Birth control/protection: Surgical     Comment: hyst   Social History Narrative    Long-term care nurse     Social Determinants of Health     Financial Resource Strain: High Risk    Difficulty of Paying Living Expenses: Hard   Food Insecurity: Food Insecurity Present    Worried About Running Out of Food in the Last Year: Often true    Ran Out of Food in the Last Year: Often true   Transportation Needs: No Transportation Needs    Lack of Transportation (Medical): No    Lack of Transportation (Non-Medical): No   Physical Activity: Inactive    Days of Exercise per Week: 0 days    Minutes of Exercise per Session: 0 min   Stress: Stress Concern Present    Feeling of Stress : Rather much   Social Connections: Moderately Isolated    Frequency of Communication with Friends and Family: Twice a week    Frequency of Social Gatherings with Friends and Family: Twice a week    Attends [employer] Services: More than 4 times per year    Active Member of Clubs or Organizations: No    Attends Club or Organization Meetings: Never    Marital Status: Never    Housing Stability: High Risk    Unable to Pay for Housing in the Last Year: Yes    Number of Places Lived in the Last Year: 1    Unstable Housing in the Last Year: No     Outpatient Encounter Medications as of 9/7/2022   Medication Sig Dispense Refill    albuterol (PROVENTIL/VENTOLIN HFA) 90 mcg/actuation inhaler Inhale 2 puffs into the lungs every 6 hours as needed for wheezing 8.5 g 3    amLODIPine (NORVASC) 10 MG tablet Take 10 mg by mouth every morning.      amlodipine-valsartan (EXFORGE)  mg per tablet Take 1 tablet by mouth once daily. 90 tablet 1  "   aspirin (ECOTRIN) 81 MG EC tablet Take 81 mg by mouth once daily.      atorvastatin (LIPITOR) 20 MG tablet Take 1 tablet (20 mg total) by mouth every evening. 90 tablet 3    azithromycin (ZITHROMAX) 500 MG tablet Take 1 tablet (500 mg total) by mouth once daily. for 5 days 5 tablet 0    BD INSULIN SYRINGE ULTRA-FINE 1/2 mL 30 gauge x 1/2" Syrg   0    blood sugar diagnostic (ONETOUCH ULTRA TEST) Strp Check blood glucose 4 times daily as directed and as needed (dispense insurance preferred brand or patient choice) 200 each 5    blood sugar diagnostic Strp 1 each by Misc.(Non-Drug; Combo Route) route 4 (four) times daily. [facility] Glucose Test Strips 400 strip 3    blood-glucose meter Misc Use as directed (Patient taking differently: Use as directed) 1 each 0    budesonide-formoterol 160-4.5 mcg (SYMBICORT) 160-4.5 mcg/actuation HFAA Inhale 2 puffs into the lungs every 12 (twelve) hours. Controller : Use spacer 10.2 g 6    butalbital-acetaminophen-caffeine -40 mg (FIORICET, ESGIC) -40 mg per tablet Take 1 tablet by mouth every 4 (four) hours as needed for Headaches. 30 tablet 3    clonazePAM (KLONOPIN) 1 MG tablet Take 1 tablet (1 mg total) by mouth once daily. 30 tablet 2    cycloSPORINE 0.05 % Drop Place 1 drop into both eyes 2 (two) times daily. 5.5 mL 11    diclofenac sodium (VOLTAREN) 1 % Gel Apply 2 grams topically 4 (four) times daily as needed. 5 each 0    DULoxetine (CYMBALTA) 60 MG capsule Take 1 capsule (60 mg total) by mouth once daily. 30 capsule 2    ergocalciferol (ERGOCALCIFEROL) 50,000 unit Cap Take 1 capsule twice weekly for 3 weeks then weekly 12 capsule 3    fenofibrate (TRICOR) 145 MG tablet Take 1 tablet (145 mg total) by mouth once daily. 90 tablet 1    flash glucose scanning reader (FREESTYLE AMY 2 READER) Misc Use as directed to check blood sugar 1 each 1    fluticasone propionate (FLONASE) 50 mcg/actuation nasal spray 2 sprays (100 mcg total) by Each Nostril route once daily. " "16 g 0    frovatriptan (FROVA) 2.5 MG tablet Take 1 tablet at onset of acute migraine. May take 1 more tablet 2 hours later if needed. (MAX 2 TABLET PER DAY. MAX 4 TABLETS PER WEEK.) (Patient not taking: Reported on 8/26/2022) 9 tablet 1    furosemide (LASIX) 20 MG tablet Take 1 tablet (20 mg total) by mouth once daily. 30 tablet 11    HEALTHYLAX 17 gram PwPk Take 17 g by mouth 2 (two) times daily.      insulin aspart U-100 (NOVOLOG FLEXPEN U-100 INSULIN) 100 unit/mL (3 mL) InPn pen Inject 10 Units into the skin 3 (three) times daily before meals. Use based on sliding scale 9 mL 3    insulin glargine U-300 conc (TOUJEO MAX U-300 SOLOSTAR) 300 unit/mL (3 mL) insulin pen Inject 86 Units into the skin once daily. 9 pen 3    lancets (ONETOUCH DELICA PLUS LANCET) 30 gauge Misc Use as directed 3 times daily 100 each 11    levocetirizine (XYZAL) 5 MG tablet Take 1 tablet (5 mg total) by mouth every evening. 30 tablet 11    LIDOcaine-prilocaine (EMLA) cream Apply topically up to 4 times per day to affected area for pain relief 60 g 0    lurasidone (LATUDA) 60 mg Tab tablet Take 1 tablet by mouth daily with 350 calories 30 tablet 2    magnesium oxide (MAG-OX) 400 mg (241.3 mg magnesium) tablet Take 1 tablet (400 mg total) by mouth once daily. 90 tablet 0    metFORMIN (GLUCOPHAGE) 1000 MG tablet Take 1 tablet (1,000 mg total) by mouth 2 (two) times daily with meals. 180 tablet 0    metoprolol succinate (TOPROL-XL) 50 MG 24 hr tablet Take 1 tablet (50 mg total) by mouth every evening. 90 tablet 0    multivit-iron-FA-calcium-mins (DAILY MULTIPLE FOR WOMEN) 18 mg iron-400 mcg-500 mg Ca Tab Take 1 tablet by mouth once daily. 30 tablet 3    pen needle, diabetic (BD ULTRA-FINE MINI PEN NEEDLE) 31 gauge x 3/16" Ndle Use 5 a day 200 each 3    pen needle, diabetic (BD ULTRA-FINE MINI PEN NEEDLE) 31 gauge x 3/16" Ndle Use one needle 5 times a day 200 each 11    plecanatide (TRULANCE) 3 mg Tab Take 3 mg by mouth once daily. 90 tablet 0 "    spironolactone (ALDACTONE) 25 MG tablet Take 1 tablet (25 mg total) by mouth once daily. 30 tablet 6    [DISCONTINUED] glucagon, human recombinant, (GLUCAGON EMERGENCY KIT, HUMAN,) 1 mg SolR Inject 1 mg into the muscle as needed. Emergency use 1 each 1    [DISCONTINUED] valsartan (DIOVAN) 320 MG tablet Take 1 tablet (320 mg total) by mouth once daily. 90 tablet 3     No facility-administered encounter medications on file as of 9/7/2022.       Review of Systems    Objective:      There were no vitals taken for this visit.  Physical Exam    Results for orders placed or performed in visit on 07/29/22   POCT COVID-19 Rapid Screening   Result Value Ref Range    POC Rapid COVID Negative Negative     Acceptable Yes      *Note: Due to a large number of results and/or encounters for the requested time period, some results have not been displayed. A complete set of results can be found in Results Review.     Assessment:       1. Acute non-recurrent maxillary sinusitis        Plan:   Acute non-recurrent maxillary sinusitis    Other orders  -     azithromycin (ZITHROMAX) 500 MG tablet; Take 1 tablet (500 mg total) by mouth once daily. for 5 days  Dispense: 5 tablet; Refill: 0  -     levocetirizine (XYZAL) 5 MG tablet; Take 1 tablet (5 mg total) by mouth every evening.  Dispense: 30 tablet; Refill: 11            [facility]   [address]  [place] [address]  Office [phone]  Fax [phone]

## 2022-09-12 ENCOUNTER — PATIENT OUTREACH (OUTPATIENT)
Dept: ADMINISTRATIVE | Facility: OTHER | Age: 52
End: 2022-09-12
Payer: MEDICAID

## 2022-09-12 NOTE — PROGRESS NOTES
CHW - Follow Up    This Community Health Worker completed a follow up visit with patient via telephone today.  Pt reported: She was able to find energy assistance but not rental assistance.   Community Health Worker provided: Well look for more resources to help this patient.  Follow up required: Yes  Follow-up Outreach - Due: 9/16/2022

## 2022-09-12 NOTE — PROGRESS NOTES
Chief Complaint:   Renal stones    HPI:   Patient is a 51-year-old female that is presenting with complaints of right flank pain.  Patient reported acute onset of right flank pain and she proceeded to ED.  CT scan renal stone study indicated small nonobstructing right kidney stone.  Patient states that pain has greatly decreased.  No history of previous renal stones.  Urine in clinic was negative in PVR was 12 mL. No hematuria.      Allergies:  Codeine; Hydromorphone; Lyrica [pregabalin]; Neuromuscular blockers, steroidal; Latex, natural rubber; Morphine; Norco [hydrocodone-acetaminophen]; Seconal [secobarbital sodium]; and Tylox [oxycodone-acetaminophen]    Medications:  has a current medication list which includes the following prescription(s): amlodipine, aspirin, bd insulin syringe ultra-fine, blood sugar diagnostic, blood sugar diagnostic, blood-glucose meter, budesonide-formoterol 160-4.5 mcg, clonazepam, cyclobenzaprine, cyclosporine, diclofenac sodium, docusate sodium, duloxetine, duloxetine, fenofibrate, fish oil-omega-3 fatty acids, freestyle lisha 2 reader, fluconazole, frovatriptan, furosemide, insulin aspart u-100, toujeo max u-300 solostar, lancets, lidocaine-prilocaine, lurasidone, magnesium oxide, metformin, milnacipran, multivit,calc,mins/iron/folic, mupirocin, nystatin, pen needle, diabetic, pen needle, diabetic, polyethylene glycol, spironolactone, valsartan, amlodipine, and [DISCONTINUED] glucagon emergency kit (human).    Review of Systems:  General: No fever, chills, fatigability, or weight loss.  Skin: No rashes, itching, or changes in color or texture of skin.  Chest: Denies COATS, cyanosis, wheezing, cough, and sputum production.  Abdomen: Appetite fine. No weight loss. Denies diarrhea, abdominal pain, hematemesis, or blood in stool.  Musculoskeletal: No joint stiffness or swelling. Denies back pain.  : As above.  All other review of systems negative.    PMH:   has a past medical history of  INFECTIOUS DISEASE CLINIC  09/12/2022 12:46 PM    Subjective:      Chief Complaint: hospital follow up    History of Present Illness:    Patient Dale Pantoja is a 41 y.o. male with spina bifida and wheelchair dependence who presents today for follow up of R ischial tuberosity osteomyelitis. Last seen by ID 8/24. On 6 weeks of IV unasyn (est end date 9/5). Here for end of care. Wound continuing to close/improve. Denies issues with picc. Reports compliance with abx.         Specimen Information: Buttocks, Right; Abscess        0 Result Notes    Component 1 mo ago    Anaerobic Culture  Abnormal   BACTEROIDES FRAGILIS   Moderate         Aerobic Bacterial Culture  Abnormal   ENTEROCOCCUS FAECALIS   Few     Resulting Agency [facility]          Susceptibility     Enterococcus faecalis     CULTURE, AEROBIC  (SPECIFY SOURCE)     Ampicillin <=2 mcg/mL Sensitive     Gentamicin Synergy Screen <=500 mcg/mL Sensitive     Tetracycline >8 mcg/mL Resistant     Vancomycin 1 mcg/mL Sensitive                     Component Ref Range & Units 2 d ago 6 d ago 2 wk ago 1 mo ago   Sed Rate 0 - 10 mm/Hr 16 High   25 High   10  72 High            Component Ref Range & Units 2 d ago 6 d ago 2 wk ago 3 wk ago 1 mo ago   CRP 0.0 - 8.2 mg/L 22.1 High   22.5 High   17.6 High   20.0 High   144.6 High                Review of Symptoms:  Constitutional: Denies fevers, chills, or weakness.  ENT: Denies dysphagia, nasal discharge, ear pain or discharge.  Cardiovascular: Denies chest pain, palpitations, orthopnea, or claudication.  Respiratory: Denies shortness of breath, cough, hemoptysis, or wheezing.  GI: Denies nausea/vomitting, hematochezia, melena, abd pain, or changes in appetite.  : Denies dysuria, incontinence, or hematuria.  Musculoskeletal: Denies joint pain or myalgias.  Skin/breast: Denies rashes, lumps, lesions, or discharge.  Neurologic: Denies headache, dizziness, vertigo, or paresthesias.    Past Medical History:   Diagnosis Date     "Overactive bladder     Spina bifida     Type 2 diabetes mellitus, without long-term current use of insulin 10/21/2021    Urinary tract infection        Past Surgical History:   Procedure Laterality Date    SPINE SURGERY      VENTRICULOPERITONEAL SHUNT         Family History   Problem Relation Age of Onset    Spina bifida Brother     Thyroid disease Neg Hx     Hypertension Neg Hx     Glaucoma Neg Hx     Prostate cancer Neg Hx     Kidney disease Neg Hx        Social History     Socioeconomic History    Marital status: Single   Tobacco Use    Smoking status: Never    Smokeless tobacco: Never   Substance and Sexual Activity    Alcohol use: No    Drug use: No    Sexual activity: Never       Review of patient's allergies indicates:   Allergen Reactions    Latex, natural rubber          Objective:   BP (!) 156/101   Pulse 92   Ht 5' 3" (1.6 m)   Wt 71.2 kg (157 lb)   SpO2 98%   BMI 27.81 kg/m²     General: Afebrile, alert, comfortable, no acute distress. wheelchair  HEENT: AIDE. EOMI, no scleral icterus.   Pulmonary: Non labored,clear to auscultation A/P/L. No wheezing, crackles, or rhonchi.  Cardiac: normal S1 & S2 w/o rubs/murmurs/gallops.  Abdominal: Non-tender, non-distended.  Extremities: paraplegia.   Skin: R buttox sacral wound with pink granulation tissue, no drainage. Doesn't probe to bone  Neurological:  Alert and oriented x 4.   Labs:    Glucose   Date Value Ref Range Status   09/06/2022 185 (H) 70 - 110 mg/dL Final   08/29/2022 102 70 - 110 mg/dL Final   08/22/2022 64 (L) 70 - 110 mg/dL Final       Calcium   Date Value Ref Range Status   09/06/2022 8.6 (L) 8.7 - 10.5 mg/dL Final   08/29/2022 8.7 8.7 - 10.5 mg/dL Final   08/22/2022 8.1 (L) 8.7 - 10.5 mg/dL Final       Albumin   Date Value Ref Range Status   09/06/2022 3.4 (L) 3.5 - 5.2 g/dL Final   08/29/2022 3.2 (L) 3.5 - 5.2 g/dL Final   08/22/2022 2.8 (L) 3.5 - 5.2 g/dL Final       Total Protein   Date Value Ref Range Status   09/06/2022 7.7 6.0 - 8.4 " Abnormal Pap smear of cervix, Anemia, Anxiety, Arthritis, Asthma (10/11/2016), Bipolar 1 disorder, Diabetes mellitus, type 2, Dyslipidemia associated with type 2 diabetes mellitus (2019), General anesthetics causing adverse effect in therapeutic use, Genital warts, GERD (gastroesophageal reflux disease), Herpes simplex virus (HSV) infection, Hyperlipidemia, Hypertension, Hypertension complicating diabetes (2019), Migraine with aura and without status migrainosus, not intractable (3/21/2016), Mild persistent asthma without complication (10/11/2016), Morbid obesity with body mass index (BMI) of 45.0 to 49.9 in adult (8/3/2017), Obstructive sleep apnea, ALEN (obstructive sleep apnea), Schizoaffective disorder, bipolar type (2019), Seasonal allergic rhinitis due to pollen (2019), Type 2 diabetes mellitus with hyperglycemia, with long-term current use of insulin (2017), and Type 2 diabetes mellitus with hyperglycemia, without long-term current use of insulin (2017).    PSH:   has a past surgical history that includes  section; Mouth surgery (); Breast surgery (Bilateral, ); Tubal ligation; Colonoscopy; Belt abdominoplasty (); Colonoscopy (N/A, 2018); Colonoscopy (N/A, 2018); Oophorectomy; Hysterectomy; abcess removed right back; Robot-assisted cholecystectomy using da Sharonda Xi (N/A, 1/3/2020); and Total Reduction Mammoplasty.    FamHx: family history includes Anesthesia problems in her mother; Asthma in her mother; Breast cancer in her maternal cousin, maternal cousin, maternal cousin, and maternal cousin; COPD in her mother; Cancer in her father and maternal grandfather; Cataracts in her maternal grandfather, maternal grandmother, paternal grandfather, and paternal grandmother; Diabetes in her maternal grandfather, maternal grandmother, and mother; Glaucoma in her maternal grandfather and mother; Heart disease in her maternal grandfather, maternal grandmother,  g/dL Final   08/29/2022 7.9 6.0 - 8.4 g/dL Final   08/22/2022 7.1 6.0 - 8.4 g/dL Final       Sodium   Date Value Ref Range Status   09/06/2022 136 136 - 145 mmol/L Final   08/29/2022 140 136 - 145 mmol/L Final   08/22/2022 145 136 - 145 mmol/L Final       Potassium   Date Value Ref Range Status   09/06/2022 4.3 3.5 - 5.1 mmol/L Final   08/29/2022 3.1 (L) 3.5 - 5.1 mmol/L Final   08/22/2022 3.3 (L) 3.5 - 5.1 mmol/L Final       CO2   Date Value Ref Range Status   09/06/2022 24 23 - 29 mmol/L Final   08/29/2022 25 23 - 29 mmol/L Final   08/22/2022 24 23 - 29 mmol/L Final       Chloride   Date Value Ref Range Status   09/06/2022 103 95 - 110 mmol/L Final   08/29/2022 103 95 - 110 mmol/L Final   08/22/2022 106 95 - 110 mmol/L Final       BUN   Date Value Ref Range Status   09/06/2022 11 6 - 20 mg/dL Final   08/29/2022 12 6 - 20 mg/dL Final   08/22/2022 10 6 - 20 mg/dL Final       Creatinine   Date Value Ref Range Status   09/06/2022 0.7 0.5 - 1.4 mg/dL Final   08/29/2022 0.7 0.5 - 1.4 mg/dL Final   08/22/2022 0.7 0.5 - 1.4 mg/dL Final       Alkaline Phosphatase   Date Value Ref Range Status   09/06/2022 129 55 - 135 U/L Final   08/29/2022 143 (H) 55 - 135 U/L Final   08/22/2022 117 55 - 135 U/L Final       ALT   Date Value Ref Range Status   09/06/2022 19 10 - 44 U/L Final   08/29/2022 38 10 - 44 U/L Final   08/22/2022 30 10 - 44 U/L Final       AST   Date Value Ref Range Status   09/06/2022 16 10 - 40 U/L Final     Comment:     *Result may be interfered by visible hemolysis   08/29/2022 19 10 - 40 U/L Final   08/22/2022 15 10 - 40 U/L Final       Total Bilirubin   Date Value Ref Range Status   09/06/2022 0.5 0.1 - 1.0 mg/dL Final     Comment:     For infants and newborns, interpretation of results should be based  on gestational age, weight and in agreement with clinical  observations.    Premature Infant recommended reference ranges:  Up to 24 hours.............<8.0 mg/dL  Up to 48 hours............<12.0 mg/dL  3-5  paternal grandfather, and paternal grandmother; Hyperlipidemia in her father, maternal grandfather, maternal grandmother, mother, paternal grandfather, and paternal grandmother; Hypertension in her father, maternal grandfather, maternal grandmother, mother, paternal grandfather, and paternal grandmother; Macular degeneration in her maternal grandfather; Thyroid disease in her mother.    SocHx:  reports that she has never smoked. She has never used smokeless tobacco. She reports current alcohol use. She reports that she does not use drugs.      Physical Exam:  Vitals:    02/17/22 0818   BP: 124/85   Pulse: 96   Resp: 16   Temp: 97.3 °F (36.3 °C)     General: A&Ox3, no apparent distress, no deformities  Neck: No masses, normal thyroid  Lungs: normal inspiration, no use of accessory muscles  Heart: normal pulse, no arrhythmias  Abdomen: Soft, NT, ND, no masses, no hernias, no hepatosplenomegaly    Labs/Studies:   See HPI    Impression/Plan:   Nonobstructing right calculi  Patient states that pain has resolved and she believes that she has passed the stone.  Will proceed with KUB and renal ultrasound and call her with results.     days..................<15.0 mg/dL  6-29 days.................<15.0 mg/dL     08/29/2022 0.6 0.1 - 1.0 mg/dL Final     Comment:     For infants and newborns, interpretation of results should be based  on gestational age, weight and in agreement with clinical  observations.    Premature Infant recommended reference ranges:  Up to 24 hours.............<8.0 mg/dL  Up to 48 hours............<12.0 mg/dL  3-5 days..................<15.0 mg/dL  6-29 days.................<15.0 mg/dL     08/22/2022 0.4 0.1 - 1.0 mg/dL Final     Comment:     For infants and newborns, interpretation of results should be based  on gestational age, weight and in agreement with clinical  observations.    Premature Infant recommended reference ranges:  Up to 24 hours.............<8.0 mg/dL  Up to 48 hours............<12.0 mg/dL  3-5 days..................<15.0 mg/dL  6-29 days.................<15.0 mg/dL         WBC   Date Value Ref Range Status   09/06/2022 4.65 3.90 - 12.70 K/uL Final   08/29/2022 5.56 3.90 - 12.70 K/uL Final   08/22/2022 5.96 3.90 - 12.70 K/uL Final       Hemoglobin   Date Value Ref Range Status   09/06/2022 12.7 (L) 14.0 - 18.0 g/dL Final   08/29/2022 12.4 (L) 14.0 - 18.0 g/dL Final   08/22/2022 12.2 (L) 14.0 - 18.0 g/dL Final       Hematocrit   Date Value Ref Range Status   09/06/2022 40.5 40.0 - 54.0 % Final   08/29/2022 39.7 (L) 40.0 - 54.0 % Final   08/22/2022 39.5 (L) 40.0 - 54.0 % Final       MCV   Date Value Ref Range Status   09/06/2022 74 (L) 82 - 98 fL Final   08/29/2022 75 (L) 82 - 98 fL Final   08/22/2022 76 (L) 82 - 98 fL Final       Platelets   Date Value Ref Range Status   09/06/2022 290 150 - 450 K/uL Final   08/29/2022 294 150 - 450 K/uL Final   08/22/2022 244 150 - 450 K/uL Final       Lab Results   Component Value Date    CHOL 184 02/14/2022    CHOL 178 09/17/2021    CHOL 193 05/04/2020       Lab Results   Component Value Date    HDL 35 (L) 02/14/2022    HDL 34 (L) 09/17/2021    HDL 43 05/04/2020       Lab  Results   Component Value Date    LDLCALC 132.8 02/14/2022    LDLCALC 128.6 09/17/2021    LDLCALC 138.2 05/04/2020       Lab Results   Component Value Date    TRIG 81 02/14/2022    TRIG 77 09/17/2021    TRIG 59 05/04/2020       Lab Results   Component Value Date    CHOLHDL 19.0 (L) 02/14/2022    CHOLHDL 19.1 (L) 09/17/2021    CHOLHDL 22.3 05/04/2020     No results found for: RPR  No results found for: QUANTIFERON    Medications:  Current Outpatient Medications on File Prior to Visit   Medication Sig Dispense Refill    amLODIPine (NORVASC) 10 MG tablet Take 1 tablet (10 mg total) by mouth once daily. 90 tablet 2    atorvastatin (LIPITOR) 40 MG tablet Take 1 tablet (40 mg total) by mouth once daily. 90 tablet 3    melatonin (MELATIN) 3 mg tablet Take 2 tablets (6 mg total) by mouth nightly as needed for Insomnia. 30 tablet 0    metFORMIN (GLUCOPHAGE) 500 MG tablet Take 1 tablet (500 mg total) by mouth 3 (three) times daily with meals. 270 tablet 3    oxybutynin (DITROPAN XL) 15 MG TR24 Take 1 tablet (15 mg total) by mouth once daily. 90 tablet 3    polyethylene glycol (GLYCOLAX) 17 gram PwPk Take 17 g by mouth once daily. 90 packet 3    valsartan (DIOVAN) 80 MG tablet Take 1 tablet (80 mg total) by mouth once daily. 90 tablet 3     No current facility-administered medications on file prior to visit.       Antibiotics:   Antibiotics (From admission, onward)      None            HIV: No components found for: HIV 1/2 AG/AB  Hepatitis C IgG: No components found for: HEPATITIS C  Syphilis: No results found for: RPR    Hepatitis A IgG: No components found for: HEPATITIS A IGG  Hepatitis Bc IgG: No components found for: HEPATITIS B CORE IGG  Hepatitis Bs IgG:  Quantiferon: No results found for: QUANTIFERON  VZV IgG: No components found for: VARICELLA IGG    No components found for: SEDIMENTATION RATE  No components found for: C-REACTIVE PROTEIN      Microbiology x 7d:   Microbiology Results (last 7 days)       ** No results  found for the last 168 hours. **            Immunization History   Administered Date(s) Administered    COVID-19, MRNA, LN-S, PF (Pfizer) (Purple Cap) 03/22/2021, 04/12/2021    DTaP (5 Pertussis Antigens) 1983, 02/06/1984, 08/20/1984, 08/27/1985    Hepatitis B 12/27/2001    Hepatitis B, Adolescent/High Risk Infant 01/14/1999    IPV 12/27/2001    Influenza 09/18/2013    Influenza - Quadrivalent - PF *Preferred* (6 months and older) 09/27/2019    Influenza - Trivalent - PF (ADULT) 09/18/2013    Influenza A (H1N1) 2009 Monovalent - IM 01/29/2010    MMR 05/15/1985, 12/27/2001    OPV 1983, 02/06/1984, 05/15/1985    Td (ADULT) 12/27/2001    Tdap 02/11/2016         Reviewed records today as well as relevant labs, cultures, and imaging    Assessment:       Iv antibiotics needed as outpatient  Sacral osteomyelitis  Stage iv sacral ulcer  Home health active care coordination    Wound closing/healing/filling in. Inflammatory markers trending down    No toxicities from antimicrobials  Extremely medically complex  Patient is high risk for infectious complications given paraplegia    Plan:     Stop antibiotics    Pull picc line    Encouraged pressure offloading including wheelchair cushion, air matress     Continue  wound care    Orders Placed This Encounter   Procedures    Nursing communication     Picc removal     Scheduling Instructions:      Picc removal       I have sent communication to the referring physician and/or primary care provider.     I spent a total of 21 minutes on the day of the visit. This includes face to face time and non-face to face time preparing to see the patient (eg, review of tests), obtaining and/or reviewing separately obtained history, documenting clinical information in the electronic or other health record, independently interpreting results, and communicating results to the patient/family/caregiver, or care coordination.      Cecy Maciel MD, MPH  Infectious Disease

## 2022-09-19 ENCOUNTER — PATIENT OUTREACH (OUTPATIENT)
Dept: ADMINISTRATIVE | Facility: OTHER | Age: 52
End: 2022-09-19
Payer: MEDICAID

## 2022-09-19 NOTE — PROGRESS NOTES
CHW - Case Closure    This Community Health Worker spoke to patient  via telephone today.   Pt reported No new information, however the patient did share she received help with Energy. Rental assistance will be looked for moving forward.  Pt denied any additional needs at this time and agrees with episode closure at this time.  Provided patient with Community Health Worker's contact information and encouraged her to contact this Community Health Worker if additional needs arise.

## 2022-09-21 ENCOUNTER — LAB VISIT (OUTPATIENT)
Dept: LAB | Facility: HOSPITAL | Age: 52
End: 2022-09-21
Attending: PHYSICIAN ASSISTANT
Payer: MEDICAID

## 2022-09-21 ENCOUNTER — TELEPHONE (OUTPATIENT)
Dept: ORTHOPEDICS | Facility: CLINIC | Age: 52
End: 2022-09-21
Payer: MEDICAID

## 2022-09-21 ENCOUNTER — OFFICE VISIT (OUTPATIENT)
Dept: RHEUMATOLOGY | Facility: CLINIC | Age: 52
End: 2022-09-21
Payer: MEDICAID

## 2022-09-21 VITALS
HEIGHT: 56 IN | WEIGHT: 177.94 LBS | BODY MASS INDEX: 40.03 KG/M2 | HEART RATE: 79 BPM | SYSTOLIC BLOOD PRESSURE: 109 MMHG | DIASTOLIC BLOOD PRESSURE: 69 MMHG

## 2022-09-21 DIAGNOSIS — G89.29 CHRONIC BILATERAL LOW BACK PAIN WITHOUT SCIATICA: ICD-10-CM

## 2022-09-21 DIAGNOSIS — M79.7 FIBROMYALGIA: Primary | ICD-10-CM

## 2022-09-21 DIAGNOSIS — M54.6 THORACIC BACK PAIN, UNSPECIFIED BACK PAIN LATERALITY, UNSPECIFIED CHRONICITY: ICD-10-CM

## 2022-09-21 DIAGNOSIS — E11.69 DYSLIPIDEMIA ASSOCIATED WITH TYPE 2 DIABETES MELLITUS: Chronic | ICD-10-CM

## 2022-09-21 DIAGNOSIS — M79.7 FIBROMYALGIA: ICD-10-CM

## 2022-09-21 DIAGNOSIS — Z79.4 TYPE 2 DIABETES MELLITUS WITH OTHER SPECIFIED COMPLICATION, WITH LONG-TERM CURRENT USE OF INSULIN: ICD-10-CM

## 2022-09-21 DIAGNOSIS — E11.69 TYPE 2 DIABETES MELLITUS WITH OTHER SPECIFIED COMPLICATION, WITH LONG-TERM CURRENT USE OF INSULIN: ICD-10-CM

## 2022-09-21 DIAGNOSIS — R53.83 FATIGUE, UNSPECIFIED TYPE: ICD-10-CM

## 2022-09-21 DIAGNOSIS — M54.50 CHRONIC BILATERAL LOW BACK PAIN WITHOUT SCIATICA: ICD-10-CM

## 2022-09-21 DIAGNOSIS — E78.5 DYSLIPIDEMIA ASSOCIATED WITH TYPE 2 DIABETES MELLITUS: Chronic | ICD-10-CM

## 2022-09-21 LAB
ALBUMIN SERPL BCP-MCNC: 4 G/DL (ref 3.5–5.2)
ALBUMIN/CREAT UR: 3.7 UG/MG (ref 0–30)
ALP SERPL-CCNC: 59 U/L (ref 55–135)
ALT SERPL W/O P-5'-P-CCNC: 14 U/L (ref 10–44)
ANION GAP SERPL CALC-SCNC: 8 MMOL/L (ref 8–16)
AST SERPL-CCNC: 11 U/L (ref 10–40)
BASOPHILS # BLD AUTO: 0.07 K/UL (ref 0–0.2)
BASOPHILS NFR BLD: 0.8 % (ref 0–1.9)
BILIRUB SERPL-MCNC: 0.2 MG/DL (ref 0.1–1)
BUN SERPL-MCNC: 19 MG/DL (ref 6–20)
CALCIUM SERPL-MCNC: 9.8 MG/DL (ref 8.7–10.5)
CHLORIDE SERPL-SCNC: 105 MMOL/L (ref 95–110)
CHOLEST SERPL-MCNC: 131 MG/DL (ref 120–199)
CHOLEST/HDLC SERPL: 3.4 {RATIO} (ref 2–5)
CO2 SERPL-SCNC: 27 MMOL/L (ref 23–29)
CREAT SERPL-MCNC: 0.9 MG/DL (ref 0.5–1.4)
CREAT UR-MCNC: 135 MG/DL (ref 15–325)
CRP SERPL-MCNC: 12.9 MG/L (ref 0–8.2)
DIFFERENTIAL METHOD: ABNORMAL
EOSINOPHIL # BLD AUTO: 0.2 K/UL (ref 0–0.5)
EOSINOPHIL NFR BLD: 2.3 % (ref 0–8)
ERYTHROCYTE [DISTWIDTH] IN BLOOD BY AUTOMATED COUNT: 14.6 % (ref 11.5–14.5)
ERYTHROCYTE [SEDIMENTATION RATE] IN BLOOD BY PHOTOMETRIC METHOD: 18 MM/HR (ref 0–36)
EST. GFR  (NO RACE VARIABLE): >60 ML/MIN/1.73 M^2
ESTIMATED AVG GLUCOSE: 137 MG/DL (ref 68–131)
GLUCOSE SERPL-MCNC: 140 MG/DL (ref 70–110)
HBA1C MFR BLD: 6.4 % (ref 4–5.6)
HCT VFR BLD AUTO: 36 % (ref 37–48.5)
HDLC SERPL-MCNC: 38 MG/DL (ref 40–75)
HDLC SERPL: 29 % (ref 20–50)
HGB BLD-MCNC: 11.3 G/DL (ref 12–16)
IMM GRANULOCYTES # BLD AUTO: 0.02 K/UL (ref 0–0.04)
IMM GRANULOCYTES NFR BLD AUTO: 0.2 % (ref 0–0.5)
LDLC SERPL CALC-MCNC: 43.8 MG/DL (ref 63–159)
LYMPHOCYTES # BLD AUTO: 3.5 K/UL (ref 1–4.8)
LYMPHOCYTES NFR BLD: 40.2 % (ref 18–48)
MCH RBC QN AUTO: 27 PG (ref 27–31)
MCHC RBC AUTO-ENTMCNC: 31.4 G/DL (ref 32–36)
MCV RBC AUTO: 86 FL (ref 82–98)
MICROALBUMIN UR DL<=1MG/L-MCNC: 5 UG/ML
MONOCYTES # BLD AUTO: 0.6 K/UL (ref 0.3–1)
MONOCYTES NFR BLD: 6.5 % (ref 4–15)
NEUTROPHILS # BLD AUTO: 4.3 K/UL (ref 1.8–7.7)
NEUTROPHILS NFR BLD: 50 % (ref 38–73)
NONHDLC SERPL-MCNC: 93 MG/DL
NRBC BLD-RTO: 0 /100 WBC
PLATELET # BLD AUTO: 566 K/UL (ref 150–450)
PMV BLD AUTO: 9.5 FL (ref 9.2–12.9)
POTASSIUM SERPL-SCNC: 4.8 MMOL/L (ref 3.5–5.1)
PROT SERPL-MCNC: 7.1 G/DL (ref 6–8.4)
RBC # BLD AUTO: 4.19 M/UL (ref 4–5.4)
SODIUM SERPL-SCNC: 140 MMOL/L (ref 136–145)
TRIGL SERPL-MCNC: 246 MG/DL (ref 30–150)
WBC # BLD AUTO: 8.66 K/UL (ref 3.9–12.7)

## 2022-09-21 PROCEDURE — 99214 OFFICE O/P EST MOD 30 MIN: CPT | Mod: S$PBB,,, | Performed by: PHYSICIAN ASSISTANT

## 2022-09-21 PROCEDURE — 3072F LOW RISK FOR RETINOPATHY: CPT | Mod: CPTII,,, | Performed by: PHYSICIAN ASSISTANT

## 2022-09-21 PROCEDURE — 3008F PR BODY MASS INDEX (BMI) DOCUMENTED: ICD-10-PCS | Mod: CPTII,,, | Performed by: PHYSICIAN ASSISTANT

## 2022-09-21 PROCEDURE — 3044F HG A1C LEVEL LT 7.0%: CPT | Mod: CPTII,,, | Performed by: PHYSICIAN ASSISTANT

## 2022-09-21 PROCEDURE — 85025 COMPLETE CBC W/AUTO DIFF WBC: CPT | Performed by: PHYSICIAN ASSISTANT

## 2022-09-21 PROCEDURE — 3066F NEPHROPATHY DOC TX: CPT | Mod: CPTII,,, | Performed by: PHYSICIAN ASSISTANT

## 2022-09-21 PROCEDURE — 3072F PR LOW RISK FOR RETINOPATHY: ICD-10-PCS | Mod: CPTII,,, | Performed by: PHYSICIAN ASSISTANT

## 2022-09-21 PROCEDURE — 99215 OFFICE O/P EST HI 40 MIN: CPT | Mod: PBBFAC | Performed by: PHYSICIAN ASSISTANT

## 2022-09-21 PROCEDURE — 3061F PR NEG MICROALBUMINURIA RESULT DOCUMENTED/REVIEW: ICD-10-PCS | Mod: CPTII,,, | Performed by: PHYSICIAN ASSISTANT

## 2022-09-21 PROCEDURE — 3044F PR MOST RECENT HEMOGLOBIN A1C LEVEL <7.0%: ICD-10-PCS | Mod: CPTII,,, | Performed by: PHYSICIAN ASSISTANT

## 2022-09-21 PROCEDURE — 83036 HEMOGLOBIN GLYCOSYLATED A1C: CPT | Performed by: INTERNAL MEDICINE

## 2022-09-21 PROCEDURE — 3074F SYST BP LT 130 MM HG: CPT | Mod: CPTII,,, | Performed by: PHYSICIAN ASSISTANT

## 2022-09-21 PROCEDURE — 86140 C-REACTIVE PROTEIN: CPT | Performed by: PHYSICIAN ASSISTANT

## 2022-09-21 PROCEDURE — 3066F PR DOCUMENTATION OF TREATMENT FOR NEPHROPATHY: ICD-10-PCS | Mod: CPTII,,, | Performed by: PHYSICIAN ASSISTANT

## 2022-09-21 PROCEDURE — 3074F PR MOST RECENT SYSTOLIC BLOOD PRESSURE < 130 MM HG: ICD-10-PCS | Mod: CPTII,,, | Performed by: PHYSICIAN ASSISTANT

## 2022-09-21 PROCEDURE — 80061 LIPID PANEL: CPT | Performed by: INTERNAL MEDICINE

## 2022-09-21 PROCEDURE — 1159F PR MEDICATION LIST DOCUMENTED IN MEDICAL RECORD: ICD-10-PCS | Mod: CPTII,,, | Performed by: PHYSICIAN ASSISTANT

## 2022-09-21 PROCEDURE — 80053 COMPREHEN METABOLIC PANEL: CPT | Performed by: PHYSICIAN ASSISTANT

## 2022-09-21 PROCEDURE — 4010F ACE/ARB THERAPY RXD/TAKEN: CPT | Mod: CPTII,,, | Performed by: PHYSICIAN ASSISTANT

## 2022-09-21 PROCEDURE — 81374 HLA I TYPING 1 ANTIGEN LR: CPT | Performed by: PHYSICIAN ASSISTANT

## 2022-09-21 PROCEDURE — 36415 COLL VENOUS BLD VENIPUNCTURE: CPT | Performed by: PHYSICIAN ASSISTANT

## 2022-09-21 PROCEDURE — 99999 PR PBB SHADOW E&M-EST. PATIENT-LVL V: CPT | Mod: PBBFAC,,, | Performed by: PHYSICIAN ASSISTANT

## 2022-09-21 PROCEDURE — 82570 ASSAY OF URINE CREATININE: CPT | Performed by: FAMILY MEDICINE

## 2022-09-21 PROCEDURE — 1159F MED LIST DOCD IN RCRD: CPT | Mod: CPTII,,, | Performed by: PHYSICIAN ASSISTANT

## 2022-09-21 PROCEDURE — 85652 RBC SED RATE AUTOMATED: CPT | Performed by: PHYSICIAN ASSISTANT

## 2022-09-21 PROCEDURE — 3078F PR MOST RECENT DIASTOLIC BLOOD PRESSURE < 80 MM HG: ICD-10-PCS | Mod: CPTII,,, | Performed by: PHYSICIAN ASSISTANT

## 2022-09-21 PROCEDURE — 3008F BODY MASS INDEX DOCD: CPT | Mod: CPTII,,, | Performed by: PHYSICIAN ASSISTANT

## 2022-09-21 PROCEDURE — 3061F NEG MICROALBUMINURIA REV: CPT | Mod: CPTII,,, | Performed by: PHYSICIAN ASSISTANT

## 2022-09-21 PROCEDURE — 99214 PR OFFICE/OUTPT VISIT, EST, LEVL IV, 30-39 MIN: ICD-10-PCS | Mod: S$PBB,,, | Performed by: PHYSICIAN ASSISTANT

## 2022-09-21 PROCEDURE — 99999 PR PBB SHADOW E&M-EST. PATIENT-LVL V: ICD-10-PCS | Mod: PBBFAC,,, | Performed by: PHYSICIAN ASSISTANT

## 2022-09-21 PROCEDURE — 4010F PR ACE/ARB THEARPY RXD/TAKEN: ICD-10-PCS | Mod: CPTII,,, | Performed by: PHYSICIAN ASSISTANT

## 2022-09-21 PROCEDURE — 3078F DIAST BP <80 MM HG: CPT | Mod: CPTII,,, | Performed by: PHYSICIAN ASSISTANT

## 2022-09-21 NOTE — TELEPHONE ENCOUNTER
Spoke to patient to get her scheduled for her f/u visit with Dr. Kirkpatrick. Patient voiced that she is currently at her rheumatology appointment, and requested a call back in a few minutes. Let the patient know that we would try to get in contact with her a little closer to 9am. Patient verbalized understanding and was grateful for the call back.

## 2022-09-21 NOTE — TELEPHONE ENCOUNTER
Called patient back and scheduled her f/u appointment with Dr. Kirkpatrick on 9/28 at 7:45am. Patient verbalized understanding and was grateful for the call back.

## 2022-09-21 NOTE — Clinical Note
Please touch base w patient.  She has failed PT and steroid injection in the right shoulder w me this year.  She has seen Dr. Kirkpatrick in the past.  I'd like her to follow up with him. Thank you! Mohini Wyman PA-C [facility]

## 2022-09-21 NOTE — PROGRESS NOTES
Patient ID: Yolanda Betts is a 52 y.o. female.    Chief Complaint: Fibromyalgia and Pain (Right shoulder/)      HPI: Yolanda Betts  is a 52 y.o. female who c/o Fibromyalgia and Pain (Right shoulder/).  I gave her an injection in the right shoulder earlier this year.  She has completed a course of physical therapy.  She is unable to take oral NSAIDs due to increasing blood pressure.  Unfortunately she continues with right shoulder pain.  I had recommended she follow-up with orthopedics for further treatment recommendations.  Unfortunately she has not been able to do that as of yet.    She also has known history of fibromyalgia.  She states the right shoulder pain is different than her normal fibromyalgia pain.  Recently saw Dr. Phillips.  He discontinued Savella and started her on Cymbalta 60 mg q.h.s..  He also switched her from Flexeril to tizanidine.  Both have helped her fibromyalgia symptoms.  She is still complaining of widespread diffuse pain.  She has failed gabapentin and Lyrica both in the past.  She does not take any pain medication.  Also complaining of some paraspinal pain in the cervical region today as well.  She has not spoken to Dr. Phillips in regard to that in the past.  She has had an EMG nerve conduction study showing carpal tunnel syndrome.  She is established with Dr. Roth.    Patient denies fevers, chills, photosensitivity, eye pain, shortness of breath, chest pain, hematuria, blood in the stool, rash, sicca symptoms.  No tender swollen joints.        Serologies/Labs:  Neg RF, CCP, ISAAC  Previously elevated ESR/CRP but not done in 2 years  Current Treatment:  Tizanidine  Cymbalta 60 mg qhs  Previous Treatment:   Flexeril  Lyrica  Gabapentin      Past Medical History:   Diagnosis Date    Abnormal Pap smear of cervix     HPV genital warts    Anemia     Anxiety     Arthritis     Asthma 10/11/2016    Bipolar 1 disorder     Diabetes mellitus, type 2     Dyslipidemia associated with type 2  diabetes mellitus 2019    General anesthetics causing adverse effect in therapeutic use     Genital warts     GERD (gastroesophageal reflux disease)     Herpes simplex virus (HSV) infection     Hyperlipidemia     Hypertension     Hypertension complicating diabetes 2019    Migraine with aura and without status migrainosus, not intractable 3/21/2016    Mild persistent asthma without complication 10/11/2016    Morbid obesity with body mass index (BMI) of 45.0 to 49.9 in adult 8/3/2017    Obstructive sleep apnea     ALEN (obstructive sleep apnea)     2L per N/C q HS    Schizoaffective disorder, bipolar type 2019    Seasonal allergic rhinitis due to pollen 2019    Type 2 diabetes mellitus with hyperglycemia, with long-term current use of insulin 2017    Type 2 diabetes mellitus with hyperglycemia, without long-term current use of insulin 2017     Past Surgical History:   Procedure Laterality Date    abcess removed right back      BELT ABDOMINOPLASTY      BREAST SURGERY Bilateral     Reduction     SECTION      X 2    COLONOSCOPY      COLONOSCOPY N/A 2018    Procedure: colonoscopy for iron deficiency anemia;  Surgeon: Frankie Sanchez MD;  Location: [facility] ENDO;  Service: Endoscopy;  Laterality: N/A;    COLONOSCOPY N/A 2018    Procedure: COLONOSCOPY;  Surgeon: Frankie Sanchez MD;  Location: [facility] ENDO;  Service: Endoscopy;  Laterality: N/A;    HYSTERECTOMY      w/ BSO; hypermenorrhea    MOUTH SURGERY      OOPHORECTOMY      hyst/bso, hypermenorrhea    ROBOT-ASSISTED CHOLECYSTECTOMY USING DA DARI XI N/A 1/3/2020    Procedure: XI ROBOTIC CHOLECYSTECTOMY;  Surgeon: Damir Driscoll MD;  Location: [facility] OR;  Service: General;  Laterality: N/A;    TOTAL REDUCTION MAMMOPLASTY      TUBAL LIGATION       Family History   Problem Relation Age of Onset    Diabetes Mother     Hyperlipidemia Mother     Hypertension Mother     Asthma Mother     COPD Mother     Glaucoma  "Mother     Thyroid disease Mother     Anesthesia problems Mother         "almost had a cardiac arrest" , blood clots    Hypertension Father     Hyperlipidemia Father     Cancer Father         Brain, lung, liver, kidney    Heart disease Maternal Grandmother     Hyperlipidemia Maternal Grandmother     Hypertension Maternal Grandmother     Cataracts Maternal Grandmother     Diabetes Maternal Grandmother     Heart disease Maternal Grandfather     Hyperlipidemia Maternal Grandfather     Hypertension Maternal Grandfather     Glaucoma Maternal Grandfather     Cancer Maternal Grandfather     Cataracts Maternal Grandfather     Macular degeneration Maternal Grandfather     Diabetes Maternal Grandfather     Heart disease Paternal Grandmother     Hyperlipidemia Paternal Grandmother     Hypertension Paternal Grandmother     Cataracts Paternal Grandmother     Heart disease Paternal Grandfather     Hyperlipidemia Paternal Grandfather     Hypertension Paternal Grandfather     Cataracts Paternal Grandfather     Breast cancer Maternal Cousin     Breast cancer Maternal Cousin     Breast cancer Maternal Cousin     Breast cancer Maternal Cousin      Social History     Socioeconomic History    Marital status: Single   Tobacco Use    Smoking status: Never    Smokeless tobacco: Never   Substance and Sexual Activity    Alcohol use: Yes     Alcohol/week: 0.0 standard drinks     Comment: socially  No alcohol 72h prior to sx    Drug use: No    Sexual activity: Yes     Partners: Male     Birth control/protection: Surgical     Comment: john   Social History Narrative    Long-term care nurse     Social Determinants of Health     Financial Resource Strain: High Risk    Difficulty of Paying Living Expenses: Hard   Food Insecurity: Food Insecurity Present    Worried About Running Out of Food in the Last Year: Often true    Ran Out of Food in the Last Year: Often true   Transportation Needs: No Transportation Needs    Lack of Transportation " "(Medical): No    Lack of Transportation (Non-Medical): No   Physical Activity: Inactive    Days of Exercise per Week: 0 days    Minutes of Exercise per Session: 0 min   Stress: Stress Concern Present    Feeling of Stress : Rather much   Social Connections: Moderately Isolated    Frequency of Communication with Friends and Family: Twice a week    Frequency of Social Gatherings with Friends and Family: Twice a week    Attends Christianity Services: More than 4 times per year    Active Member of Clubs or Organizations: No    Attends Club or Organization Meetings: Never    Marital Status: Never    Housing Stability: High Risk    Unable to Pay for Housing in the Last Year: Yes    Number of Places Lived in the Last Year: 1    Unstable Housing in the Last Year: No     Medication List with Changes/Refills   New Medications    NALTREXONE CAPSULE    Take 1 capsule (4.5 mg total) by mouth every evening.   Current Medications    ALBUTEROL (PROVENTIL/VENTOLIN HFA) 90 MCG/ACTUATION INHALER    Inhale 2 puffs into the lungs every 6 hours as needed for wheezing    AMLODIPINE (NORVASC) 10 MG TABLET    Take 10 mg by mouth every morning.    AMLODIPINE-VALSARTAN (EXFORGE)  MG PER TABLET    Take 1 tablet by mouth once daily.    ASPIRIN (ECOTRIN) 81 MG EC TABLET    Take 81 mg by mouth once daily.    ATORVASTATIN (LIPITOR) 20 MG TABLET    Take 1 tablet (20 mg total) by mouth every evening.    BD INSULIN SYRINGE ULTRA-FINE 1/2 ML 30 GAUGE X 1/2" SYRG        BLOOD SUGAR DIAGNOSTIC (ONETOUCH ULTRA TEST) STRP    Check blood glucose 4 times daily as directed and as needed (dispense insurance preferred brand or patient choice)    BLOOD SUGAR DIAGNOSTIC STRP    1 each by Misc.(Non-Drug; Combo Route) route 4 (four) times daily. [facility] Glucose Test Strips    BLOOD-GLUCOSE METER MISC    Use as directed    BUDESONIDE-FORMOTEROL 160-4.5 MCG (SYMBICORT) 160-4.5 MCG/ACTUATION HFAA    Inhale 2 puffs into the lungs every 12 (twelve) hours. " Controller : Use spacer    BUTALBITAL-ACETAMINOPHEN-CAFFEINE -40 MG (FIORICET, ESGIC) -40 MG PER TABLET    Take 1 tablet by mouth every 4 (four) hours as needed for Headaches.    CLONAZEPAM (KLONOPIN) 1 MG TABLET    Take 1 tablet (1 mg total) by mouth once daily.    CYCLOSPORINE 0.05 % DROP    Place 1 drop into both eyes 2 (two) times daily.    DICLOFENAC SODIUM (VOLTAREN) 1 % GEL    Apply 2 grams topically 4 (four) times daily as needed.    DULOXETINE (CYMBALTA) 60 MG CAPSULE    Take 1 capsule (60 mg total) by mouth once daily.    ERGOCALCIFEROL (ERGOCALCIFEROL) 50,000 UNIT CAP    Take 1 capsule twice weekly for 3 weeks then weekly    FENOFIBRATE (TRICOR) 145 MG TABLET    Take 1 tablet (145 mg total) by mouth once daily.    FLASH GLUCOSE SCANNING READER (Need Fixed AMY 2 READER) [facility]    Use as directed to check blood sugar    FLUTICASONE PROPIONATE (FLONASE) 50 MCG/ACTUATION NASAL SPRAY    2 sprays (100 mcg total) by Each Nostril route once daily.    FROVATRIPTAN (FROVA) 2.5 MG TABLET    Take 1 tablet at onset of acute migraine. May take 1 more tablet 2 hours later if needed. (MAX 2 TABLET PER DAY. MAX 4 TABLETS PER WEEK.)    FUROSEMIDE (LASIX) 20 MG TABLET    Take 1 tablet (20 mg total) by mouth once daily.    HEALTHYLAX 17 GRAM PWPK    Take 17 g by mouth 2 (two) times daily.    INSULIN ASPART U-100 (NOVOLOG FLEXPEN U-100 INSULIN) 100 UNIT/ML (3 ML) INPN PEN    Inject 10 Units into the skin 3 (three) times daily before meals. Use based on sliding scale    INSULIN GLARGINE U-300 CONC (TOUJEO MAX U-300 SOLOSTAR) 300 UNIT/ML (3 ML) INSULIN PEN    Inject 86 Units into the skin once daily.    LANCETS (ONETOUCH DELICA PLUS LANCET) 30 GAUGE MISC    Use as directed 3 times daily    LEVOCETIRIZINE (XYZAL) 5 MG TABLET    Take 1 tablet (5 mg total) by mouth every evening.    LIDOCAINE-PRILOCAINE (EMLA) CREAM    Apply topically up to 4 times per day to affected area for pain relief    LURASIDONE (LATUDA) 60 MG  "TAB TABLET    Take 1 tablet by mouth daily with 350 calories    MAGNESIUM OXIDE (MAG-OX) 400 MG (241.3 MG MAGNESIUM) TABLET    Take 1 tablet (400 mg total) by mouth once daily.    METFORMIN (GLUCOPHAGE) 1000 MG TABLET    Take 1 tablet (1,000 mg total) by mouth 2 (two) times daily with meals.    METOPROLOL SUCCINATE (TOPROL-XL) 50 MG 24 HR TABLET    Take 1 tablet (50 mg total) by mouth every evening.    MULTIVIT-IRON-FA-CALCIUM-MINS (DAILY MULTIPLE FOR WOMEN) 18 MG IRON-400 MCG-500 MG CA TAB    Take 1 tablet by mouth once daily.    PEN NEEDLE, DIABETIC (BD ULTRA-FINE MINI PEN NEEDLE) 31 GAUGE X 3/16" NDLE    Use 5 a day    PEN NEEDLE, DIABETIC (BD ULTRA-FINE MINI PEN NEEDLE) 31 GAUGE X 3/16" NDLE    Use one needle 5 times a day    PLECANATIDE (TRULANCE) 3 MG TAB    Take 3 mg by mouth once daily.    SPIRONOLACTONE (ALDACTONE) 25 MG TABLET    Take 1 tablet (25 mg total) by mouth once daily.     Review of patient's allergies indicates:   Allergen Reactions    Codeine Itching    Hydromorphone Other (See Comments)     Can't wake up for long time if taken  Slow to wake up after surgery after receiving    Lyrica [pregabalin] Itching    Neuromuscular blockers, steroidal Hives     some    Latex, natural rubber Rash    Morphine Rash     itching    Norco [hydrocodone-acetaminophen] Itching, Rash and Hallucinations    Seconal [secobarbital sodium] Rash     itching    Tylox [oxycodone-acetaminophen] Rash       Objective:        General    Nursing note and vitals reviewed.  Constitutional: She is oriented to person, place, and time. She appears well-developed and well-nourished.   HENT:   Head: Normocephalic and atraumatic.   Eyes: EOM are normal.   Pulmonary/Chest: Effort normal.   Neurological: She is alert and oriented to person, place, and time.   Psychiatric: She has a normal mood and affect. Her behavior is normal.         Back (L-Spine & T-Spine) / Neck (C-Spine) Exam     Tenderness   The patient is tender to palpation of " the right trapezial.       Right Hand/Wrist Exam     Tests   Tinel's sign (median nerve): positive        Left Hand/Wrist Exam     Tests   Tinel's sign (median nerve): positive        Right Elbow Exam     Comments:  Slight decrease  strength      Left Elbow Exam     Comments:  Slight decrease  strength    Right Shoulder Exam     Inspection/Observation   Swelling: absent  Bruising: absent  Scars: absent  Deformity: absent  Scapular Dyskinesia: negative    Tenderness   The patient is tender to palpation of the greater tuberosity, acromioclavicular joint and biceps tendon.    Range of Motion   Active abduction:  abnormal   Passive abduction:  normal   Extension:  abnormal   Forward Flexion:  abnormal   Forward Elevation: abnormal  Adduction: normal  External Rotation 0 degrees:  normal   Internal rotation 0 degrees:  Sacrum abnormal     Tests & Signs   Cross arm: negative  Drop arm: negative  Moseley test: positive  Impingement: positive  Active Compression Test (Crenshaw's Sign): positive  Speed's Test: positive    Other   Sensation: normal    Comments:  Multiple tender spots associated with FM    Left Shoulder Exam     Inspection/Observation   Swelling: absent  Bruising: absent  Scars: absent  Deformity: absent  Scapular Dyskinesia: negative    Range of Motion   Active abduction:  normal   Passive abduction:  normal   Extension:  normal   Forward Flexion:  normal   Forward Elevation: normal  Adduction: normal  External Rotation 0 degrees:  normal   Internal rotation 0 degrees:  normal     Other   Sensation: normal       Muscle Strength   Right Upper Extremity   Shoulder Abduction: 5/5   Shoulder Internal Rotation: 5/5   Shoulder External Rotation: 5/5   Left Upper Extremity  Shoulder Abduction: 5/5   Shoulder Internal Rotation: 5/5   Shoulder External Rotation: 5/5     Vascular Exam     Right Pulses      Radial:                    2+      Left Pulses      Radial:                    2+      Capillary  Refill  Right Hand: normal capillary refill  Left Hand: normal capillary refill              Assessment:       Encounter Diagnoses   Name Primary?    Fibromyalgia Yes    Fatigue, unspecified type           Plan:       Yolanda was seen today for fibromyalgia and pain.    Diagnoses and all orders for this visit:    Fibromyalgia  -     naltrexone capsule; Take 1 capsule (4.5 mg total) by mouth every evening.  -     HLA B27 Antigen; Future  -     CBC Auto Differential; Standing  -     Comprehensive Metabolic Panel; Standing  -     Sedimentation rate; Standing  -     C-Reactive Protein; Standing    Fatigue, unspecified type  -     naltrexone capsule; Take 1 capsule (4.5 mg total) by mouth every evening.        Fibromyalgia with associated fatigue  Naltrexone 4.5 mg q.h.s.  Discussed risks and benefits of its use  Patient may take half a tablet if she develops headaches  Prior h/o elevated inflamm markers  Labs today  Consider steroid trail  Shoulder pain  F/u w orthopedics - she has seen Dr. Kirkpatrick in the past.  Back pain  C/w f/u IPM as scheduled  On cymbalta 60 mg qhs - some room to add am dose in future.  Will try naltrexone first  Return to clinic: 6 weeks    30 minutes of total time spent on the encounter, which includes face to face time and non-face to face time preparing to see the patient (eg, review of tests), Obtaining and/or reviewing separately obtained history, Documenting clinical information in the electronic or other health record, Independently interpreting results (not separately reported) and communicating results to the patient/family/caregiver, or Care coordination (not separately reported).     Follow up in about 6 weeks (around 11/2/2022).    The patient understands, chooses and consents to this plan and accepts all   the risks which include but are not limited to the risks mentioned above.     Disclaimer: This note was prepared using a voice recognition system and is likely to have sound alike  errors within the text.

## 2022-09-21 NOTE — LETTER
September 21, 2022      [facility]  [address]  [place] [address]  Phone: [phone]  Fax: [phone]       Patient: Yolanda Betts   YOB: 1970  Date of Visit: 09/21/2022    To Whom It May Concern:    Bennett Betts  was at [facility] on 09/21/2022. The patient may return to work/school on 10/22/2022 with no restrictions. If you have any questions or concerns, or if I can be of further assistance, please do not hesitate to contact me.    Sincerely,        Chrissy Hamilton LPN

## 2022-09-22 DIAGNOSIS — R79.82 ELEVATED C-REACTIVE PROTEIN (CRP): Primary | ICD-10-CM

## 2022-09-22 RX ORDER — PREDNISONE 5 MG/1
TABLET ORAL
Qty: 35 TABLET | Refills: 0 | Status: SHIPPED | OUTPATIENT
Start: 2022-09-22 | End: 2022-10-22

## 2022-09-23 ENCOUNTER — TELEPHONE (OUTPATIENT)
Dept: RHEUMATOLOGY | Facility: CLINIC | Age: 52
End: 2022-09-23
Payer: MEDICAID

## 2022-09-23 DIAGNOSIS — M79.18 MYOFASCIAL MUSCLE PAIN: ICD-10-CM

## 2022-09-23 DIAGNOSIS — F41.1 GENERALIZED ANXIETY DISORDER: Chronic | ICD-10-CM

## 2022-09-23 DIAGNOSIS — M79.7 FIBROMYALGIA: ICD-10-CM

## 2022-09-23 DIAGNOSIS — F31.30 BIPOLAR I DISORDER, MOST RECENT EPISODE (OR CURRENT) DEPRESSED: ICD-10-CM

## 2022-09-23 NOTE — TELEPHONE ENCOUNTER
Patient was notified of lab/imaging results per provider. Patient verbalized understanding. All questions answered.  Patient was grateful for the call. -MARCELINA

## 2022-09-26 ENCOUNTER — TELEPHONE (OUTPATIENT)
Dept: PRIMARY CARE CLINIC | Facility: CLINIC | Age: 52
End: 2022-09-26
Payer: MEDICAID

## 2022-09-26 RX ORDER — DULOXETIN HYDROCHLORIDE 60 MG/1
60 CAPSULE, DELAYED RELEASE ORAL DAILY
Qty: 30 CAPSULE | Refills: 2 | Status: SHIPPED | OUTPATIENT
Start: 2022-09-26 | End: 2022-09-27 | Stop reason: SDUPTHER

## 2022-09-26 RX ORDER — CLONAZEPAM 1 MG/1
1 TABLET ORAL DAILY
Qty: 30 TABLET | Refills: 2 | OUTPATIENT
Start: 2022-09-26 | End: 2022-12-25

## 2022-09-26 RX ORDER — LURASIDONE HYDROCHLORIDE 60 MG/1
TABLET, FILM COATED ORAL
Qty: 30 TABLET | Refills: 2 | Status: SHIPPED | OUTPATIENT
Start: 2022-09-26 | End: 2022-09-27 | Stop reason: SDUPTHER

## 2022-09-27 ENCOUNTER — TELEPHONE (OUTPATIENT)
Dept: PRIMARY CARE CLINIC | Facility: CLINIC | Age: 52
End: 2022-09-27
Payer: MEDICAID

## 2022-09-27 NOTE — TELEPHONE ENCOUNTER
Returned call to patient informed schedule was full but would call her if a slot becomes available. Patient advised to go to Urgent care for evaluation and treatment. Also advised to check with pharmacy for medication refill sent in by Dr Sorenson.

## 2022-09-28 ENCOUNTER — PATIENT MESSAGE (OUTPATIENT)
Dept: PSYCHIATRY | Facility: CLINIC | Age: 52
End: 2022-09-28

## 2022-09-28 ENCOUNTER — OFFICE VISIT (OUTPATIENT)
Dept: ORTHOPEDICS | Facility: CLINIC | Age: 52
End: 2022-09-28
Payer: MEDICAID

## 2022-09-28 ENCOUNTER — PATIENT MESSAGE (OUTPATIENT)
Dept: DIABETES | Facility: CLINIC | Age: 52
End: 2022-09-28
Payer: MEDICAID

## 2022-09-28 VITALS — WEIGHT: 177 LBS | BODY MASS INDEX: 39.82 KG/M2 | HEIGHT: 56 IN

## 2022-09-28 DIAGNOSIS — M75.101 NONTRAUMATIC TEAR OF RIGHT ROTATOR CUFF, UNSPECIFIED TEAR EXTENT: Primary | ICD-10-CM

## 2022-09-28 DIAGNOSIS — G89.29 CHRONIC RIGHT SHOULDER PAIN: ICD-10-CM

## 2022-09-28 DIAGNOSIS — M25.511 CHRONIC RIGHT SHOULDER PAIN: ICD-10-CM

## 2022-09-28 DIAGNOSIS — Z79.4 TYPE 2 DIABETES MELLITUS WITH HYPERGLYCEMIA, WITH LONG-TERM CURRENT USE OF INSULIN: ICD-10-CM

## 2022-09-28 DIAGNOSIS — E11.65 TYPE 2 DIABETES MELLITUS WITH HYPERGLYCEMIA, WITH LONG-TERM CURRENT USE OF INSULIN: ICD-10-CM

## 2022-09-28 PROCEDURE — 3044F HG A1C LEVEL LT 7.0%: CPT | Mod: CPTII,,, | Performed by: STUDENT IN AN ORGANIZED HEALTH CARE EDUCATION/TRAINING PROGRAM

## 2022-09-28 PROCEDURE — 99215 OFFICE O/P EST HI 40 MIN: CPT | Mod: PBBFAC | Performed by: STUDENT IN AN ORGANIZED HEALTH CARE EDUCATION/TRAINING PROGRAM

## 2022-09-28 PROCEDURE — 3066F NEPHROPATHY DOC TX: CPT | Mod: CPTII,,, | Performed by: STUDENT IN AN ORGANIZED HEALTH CARE EDUCATION/TRAINING PROGRAM

## 2022-09-28 PROCEDURE — 3066F PR DOCUMENTATION OF TREATMENT FOR NEPHROPATHY: ICD-10-PCS | Mod: CPTII,,, | Performed by: STUDENT IN AN ORGANIZED HEALTH CARE EDUCATION/TRAINING PROGRAM

## 2022-09-28 PROCEDURE — 99999 PR PBB SHADOW E&M-EST. PATIENT-LVL V: CPT | Mod: PBBFAC,,, | Performed by: STUDENT IN AN ORGANIZED HEALTH CARE EDUCATION/TRAINING PROGRAM

## 2022-09-28 PROCEDURE — 3008F BODY MASS INDEX DOCD: CPT | Mod: CPTII,,, | Performed by: STUDENT IN AN ORGANIZED HEALTH CARE EDUCATION/TRAINING PROGRAM

## 2022-09-28 PROCEDURE — 3061F NEG MICROALBUMINURIA REV: CPT | Mod: CPTII,,, | Performed by: STUDENT IN AN ORGANIZED HEALTH CARE EDUCATION/TRAINING PROGRAM

## 2022-09-28 PROCEDURE — 99214 OFFICE O/P EST MOD 30 MIN: CPT | Mod: S$PBB,,, | Performed by: STUDENT IN AN ORGANIZED HEALTH CARE EDUCATION/TRAINING PROGRAM

## 2022-09-28 PROCEDURE — 1159F MED LIST DOCD IN RCRD: CPT | Mod: CPTII,,, | Performed by: STUDENT IN AN ORGANIZED HEALTH CARE EDUCATION/TRAINING PROGRAM

## 2022-09-28 PROCEDURE — 1160F RVW MEDS BY RX/DR IN RCRD: CPT | Mod: CPTII,,, | Performed by: STUDENT IN AN ORGANIZED HEALTH CARE EDUCATION/TRAINING PROGRAM

## 2022-09-28 PROCEDURE — 1159F PR MEDICATION LIST DOCUMENTED IN MEDICAL RECORD: ICD-10-PCS | Mod: CPTII,,, | Performed by: STUDENT IN AN ORGANIZED HEALTH CARE EDUCATION/TRAINING PROGRAM

## 2022-09-28 PROCEDURE — 99214 PR OFFICE/OUTPT VISIT, EST, LEVL IV, 30-39 MIN: ICD-10-PCS | Mod: S$PBB,,, | Performed by: STUDENT IN AN ORGANIZED HEALTH CARE EDUCATION/TRAINING PROGRAM

## 2022-09-28 PROCEDURE — 3072F LOW RISK FOR RETINOPATHY: CPT | Mod: CPTII,,, | Performed by: STUDENT IN AN ORGANIZED HEALTH CARE EDUCATION/TRAINING PROGRAM

## 2022-09-28 PROCEDURE — 99999 PR PBB SHADOW E&M-EST. PATIENT-LVL V: ICD-10-PCS | Mod: PBBFAC,,, | Performed by: STUDENT IN AN ORGANIZED HEALTH CARE EDUCATION/TRAINING PROGRAM

## 2022-09-28 PROCEDURE — 3008F PR BODY MASS INDEX (BMI) DOCUMENTED: ICD-10-PCS | Mod: CPTII,,, | Performed by: STUDENT IN AN ORGANIZED HEALTH CARE EDUCATION/TRAINING PROGRAM

## 2022-09-28 PROCEDURE — 3061F PR NEG MICROALBUMINURIA RESULT DOCUMENTED/REVIEW: ICD-10-PCS | Mod: CPTII,,, | Performed by: STUDENT IN AN ORGANIZED HEALTH CARE EDUCATION/TRAINING PROGRAM

## 2022-09-28 PROCEDURE — 4010F PR ACE/ARB THEARPY RXD/TAKEN: ICD-10-PCS | Mod: CPTII,,, | Performed by: STUDENT IN AN ORGANIZED HEALTH CARE EDUCATION/TRAINING PROGRAM

## 2022-09-28 PROCEDURE — 3044F PR MOST RECENT HEMOGLOBIN A1C LEVEL <7.0%: ICD-10-PCS | Mod: CPTII,,, | Performed by: STUDENT IN AN ORGANIZED HEALTH CARE EDUCATION/TRAINING PROGRAM

## 2022-09-28 PROCEDURE — 4010F ACE/ARB THERAPY RXD/TAKEN: CPT | Mod: CPTII,,, | Performed by: STUDENT IN AN ORGANIZED HEALTH CARE EDUCATION/TRAINING PROGRAM

## 2022-09-28 PROCEDURE — 3072F PR LOW RISK FOR RETINOPATHY: ICD-10-PCS | Mod: CPTII,,, | Performed by: STUDENT IN AN ORGANIZED HEALTH CARE EDUCATION/TRAINING PROGRAM

## 2022-09-28 PROCEDURE — 1160F PR REVIEW ALL MEDS BY PRESCRIBER/CLIN PHARMACIST DOCUMENTED: ICD-10-PCS | Mod: CPTII,,, | Performed by: STUDENT IN AN ORGANIZED HEALTH CARE EDUCATION/TRAINING PROGRAM

## 2022-09-28 RX ORDER — INSULIN GLARGINE 300 U/ML
100 INJECTION, SOLUTION SUBCUTANEOUS DAILY
Qty: 4 PEN | Refills: 3 | Status: SHIPPED | OUTPATIENT
Start: 2022-09-28 | End: 2022-12-07 | Stop reason: SDUPTHER

## 2022-09-28 RX ORDER — INSULIN ASPART 100 [IU]/ML
10 INJECTION, SOLUTION INTRAVENOUS; SUBCUTANEOUS
Qty: 15 ML | Refills: 3 | Status: SHIPPED | OUTPATIENT
Start: 2022-09-28 | End: 2023-01-04 | Stop reason: SDUPTHER

## 2022-09-28 NOTE — TELEPHONE ENCOUNTER
----- Message from Elizabeth Ceron NP sent at 9/28/2022  7:17 AM CDT -----  Contact: Yolanda  Please schedule a follow up  ----- Message -----  From: Elaine Pfeiffer MA  Sent: 9/27/2022   9:43 AM CDT  To: Elizabeth Ceron NP    Please advise   ----- Message -----  From: Tomasa Nation  Sent: 9/27/2022   8:47 AM CDT  To: [facility] Staff    Yolanda would like a call back at [phone], regards to she has started steroids with Rheumatology and she needs to get a sliding scale.    THANKS  TD

## 2022-09-28 NOTE — PROGRESS NOTES
Orthopaedic Follow-Up Visit    Last Appointment: 2/9/21  Diagnosis: Right shoulder pain, partial articular sided cuff pathology  Prior Procedure: Right GH CSI and PT    Yolanda Betts is a 52 y.o. female who is here for f/u evaluation of her right shoulder. The patient was last seen here by me on 2/9/21 at which point we decided to proceed with right shoulder glenohumeral corticosteroid injection and physical therapy prior to considering further treatment options. The patient returns today reporting that the symptoms have persisted and is interested in discussing further treatment options.     To review her history, Yolanda Betts is a 52 y.o. right-hand dominant female who presented with right shoulder pain and dysfunction that started several  years ago with no specific injury, but reported a gradual onset of symptoms. She had a prior MRI that showed some partial articular sided tearing. At her last visit on 2/9/21 we proceeded with right shoulder glenohumeral corticosteroid injection and continuation of physical therapy. She returns today for further evaluation.      Patient's medications, allergies, past medical, surgical, social and family histories were reviewed and updated as appropriate.    Review of Systems   All systems reviewed were negative.  Specifically, the patient denies fever, chills, weight loss, chest pain, shortness of breath, or dyspnea on exertion.      Past Medical History:   Diagnosis Date    Abnormal Pap smear of cervix     HPV genital warts    Anemia     Anxiety     Arthritis     Asthma 10/11/2016    Bipolar 1 disorder     Diabetes mellitus, type 2     Dyslipidemia associated with type 2 diabetes mellitus 5/13/2019    General anesthetics causing adverse effect in therapeutic use     Genital warts     GERD (gastroesophageal reflux disease)     Herpes simplex virus (HSV) infection     Hyperlipidemia     Hypertension     Hypertension complicating diabetes 5/5/2019    Migraine with  aura and without status migrainosus, not intractable 3/21/2016    Mild persistent asthma without complication 10/11/2016    Morbid obesity with body mass index (BMI) of 45.0 to 49.9 in adult 8/3/2017    Obstructive sleep apnea     ALEN (obstructive sleep apnea)     2L per N/C q HS    Schizoaffective disorder, bipolar type 2019    Seasonal allergic rhinitis due to pollen 2019    Type 2 diabetes mellitus with hyperglycemia, with long-term current use of insulin 2017    Type 2 diabetes mellitus with hyperglycemia, without long-term current use of insulin 2017       Past Surgical History:   Procedure Laterality Date    abcess removed right back      BELT ABDOMINOPLASTY      BREAST SURGERY Bilateral     Reduction     SECTION      X 2    COLONOSCOPY      COLONOSCOPY N/A 2018    Procedure: colonoscopy for iron deficiency anemia;  Surgeon: Frankie Sanchez MD;  Location: [facility], [place] ENDO;  Service: Endoscopy;  Laterality: N/A;    COLONOSCOPY N/A 2018    Procedure: COLONOSCOPY;  Surgeon: Frankie Sanchez MD;  Location: [facility], [place] ENDO;  Service: Endoscopy;  Laterality: N/A;    HYSTERECTOMY      w/ BSO; hypermenorrhea    MOUTH SURGERY      OOPHORECTOMY      hyst/bso, hypermenorrhea    ROBOT-ASSISTED CHOLECYSTECTOMY USING DA DARI XI N/A 1/3/2020    Procedure: XI ROBOTIC CHOLECYSTECTOMY;  Surgeon: Damir Driscoll MD;  Location: [facility], [place] OR;  Service: General;  Laterality: N/A;    TOTAL REDUCTION MAMMOPLASTY      TUBAL LIGATION         Patient's Medications   New Prescriptions    No medications on file   Previous Medications    ALBUTEROL (PROVENTIL/VENTOLIN HFA) 90 MCG/ACTUATION INHALER    Inhale 2 puffs into the lungs every 6 hours as needed for wheezing    AMLODIPINE (NORVASC) 10 MG TABLET    Take 10 mg by mouth every morning.    AMLODIPINE-VALSARTAN (EXFORGE)  MG PER TABLET    Take 1 tablet by mouth once daily.    ASPIRIN (ECOTRIN) 81 MG EC TABLET    Take 81 mg by mouth once  "daily.    ATORVASTATIN (LIPITOR) 20 MG TABLET    Take 1 tablet (20 mg total) by mouth every evening.    BD INSULIN SYRINGE ULTRA-FINE 1/2 ML 30 GAUGE X 1/2" SYRG        BLOOD SUGAR DIAGNOSTIC (ONETOUCH ULTRA TEST) STRP    Check blood glucose 4 times daily as directed and as needed (dispense insurance preferred brand or patient choice)    BLOOD SUGAR DIAGNOSTIC STRP    1 each by Misc.(Non-Drug; Combo Route) route 4 (four) times daily. [facility] Glucose Test Strips    BLOOD-GLUCOSE METER MISC    Use as directed    BUDESONIDE-FORMOTEROL 160-4.5 MCG (SYMBICORT) 160-4.5 MCG/ACTUATION HFAA    Inhale 2 puffs into the lungs every 12 (twelve) hours. Controller : Use spacer    CLONAZEPAM (KLONOPIN) 1 MG TABLET    Take 1 tablet (1 mg total) by mouth once daily.    CYCLOSPORINE 0.05 % DROP    Place 1 drop into both eyes 2 (two) times daily.    DICLOFENAC SODIUM (VOLTAREN) 1 % GEL    Apply 2 grams topically 4 (four) times daily as needed.    DULOXETINE (CYMBALTA) 60 MG CAPSULE    Take 1 capsule (60 mg total) by mouth once daily.    ERGOCALCIFEROL (ERGOCALCIFEROL) 50,000 UNIT CAP    Take 1 capsule twice weekly for 3 weeks then weekly    FENOFIBRATE (TRICOR) 145 MG TABLET    Take 1 tablet (145 mg total) by mouth once daily.    FLASH GLUCOSE SCANNING READER (FREESTYLE AMY 2 READER) [facility]    Use as directed to check blood sugar    FLUTICASONE PROPIONATE (FLONASE) 50 MCG/ACTUATION NASAL SPRAY    2 sprays (100 mcg total) by Each Nostril route once daily.    FROVATRIPTAN (FROVA) 2.5 MG TABLET    Take 1 tablet at onset of acute migraine. May take 1 more tablet 2 hours later if needed. (MAX 2 TABLET PER DAY. MAX 4 TABLETS PER WEEK.)    FUROSEMIDE (LASIX) 20 MG TABLET    Take 1 tablet (20 mg total) by mouth once daily.    HEALTHYLAX 17 GRAM PWPK    Take 17 g by mouth 2 (two) times daily.    INSULIN ASPART U-100 (NOVOLOG FLEXPEN U-100 INSULIN) 100 UNIT/ML (3 ML) INPN PEN    Inject 10 Units into the skin 3 (three) times daily before meals. " "Use based on sliding scale    INSULIN GLARGINE U-300 CONC (TOUJEO MAX U-300 SOLOSTAR) 300 UNIT/ML (3 ML) INSULIN PEN    Inject 86 Units into the skin once daily.    LANCETS (ONETOUCH DELICA PLUS LANCET) 30 GAUGE MISC    Use as directed 3 times daily    LEVOCETIRIZINE (XYZAL) 5 MG TABLET    Take 1 tablet (5 mg total) by mouth every evening.    LIDOCAINE-PRILOCAINE (EMLA) CREAM    Apply topically up to 4 times per day to affected area for pain relief    LURASIDONE (LATUDA) 60 MG TAB TABLET    Take 1 tablet by mouth daily with 350 calories    MAGNESIUM OXIDE (MAG-OX) 400 MG (241.3 MG MAGNESIUM) TABLET    Take 1 tablet (400 mg total) by mouth once daily.    METFORMIN (GLUCOPHAGE) 1000 MG TABLET    Take 1 tablet (1,000 mg total) by mouth 2 (two) times daily with meals.    METOPROLOL SUCCINATE (TOPROL-XL) 50 MG 24 HR TABLET    Take 1 tablet (50 mg total) by mouth every evening.    NALTREXONE CAPSULE    Take 1 capsule (4.5 mg total) by mouth every evening.    PEN NEEDLE, DIABETIC (BD ULTRA-FINE MINI PEN NEEDLE) 31 GAUGE X 3/16" NDLE    Use 5 a day    PEN NEEDLE, DIABETIC (BD ULTRA-FINE MINI PEN NEEDLE) 31 GAUGE X 3/16" NDLE    Use one needle 5 times a day    PREDNISONE (DELTASONE) 5 MG TABLET    Take 2 tablets (10 mg total) by mouth once daily for 5 days, THEN 1 tablet (5 mg total) once daily for 25 days.    SPIRONOLACTONE (ALDACTONE) 25 MG TABLET    Take 1 tablet (25 mg total) by mouth once daily.   Modified Medications    No medications on file   Discontinued Medications    No medications on file       Family History   Problem Relation Age of Onset    Diabetes Mother     Hyperlipidemia Mother     Hypertension Mother     Asthma Mother     COPD Mother     Glaucoma Mother     Thyroid disease Mother     Anesthesia problems Mother         "almost had a cardiac arrest" , blood clots    Hypertension Father     Hyperlipidemia Father     Cancer Father         Brain, lung, liver, kidney    Heart disease Maternal Grandmother     " Hyperlipidemia Maternal Grandmother     Hypertension Maternal Grandmother     Cataracts Maternal Grandmother     Diabetes Maternal Grandmother     Heart disease Maternal Grandfather     Hyperlipidemia Maternal Grandfather     Hypertension Maternal Grandfather     Glaucoma Maternal Grandfather     Cancer Maternal Grandfather     Cataracts Maternal Grandfather     Macular degeneration Maternal Grandfather     Diabetes Maternal Grandfather     Heart disease Paternal Grandmother     Hyperlipidemia Paternal Grandmother     Hypertension Paternal Grandmother     Cataracts Paternal Grandmother     Heart disease Paternal Grandfather     Hyperlipidemia Paternal Grandfather     Hypertension Paternal Grandfather     Cataracts Paternal Grandfather     Breast cancer Maternal Cousin     Breast cancer Maternal Cousin     Breast cancer Maternal Cousin     Breast cancer Maternal Cousin        Review of patient's allergies indicates:   Allergen Reactions    Codeine Itching    Hydromorphone Other (See Comments)     Can't wake up for long time if taken  Slow to wake up after surgery after receiving    Lyrica [pregabalin] Itching    Neuromuscular blockers, steroidal Hives     some    Latex, natural rubber Rash    Morphine Rash     itching    Norco [hydrocodone-acetaminophen] Itching, Rash and Hallucinations    Seconal [secobarbital sodium] Rash     itching    Tylox [oxycodone-acetaminophen] Rash         Objective:      Physical Exam  Patient is alert and oriented, no distress. Skin is intact. Neuro is normal with no focal motor or sensory findings.    Cervical exam is unremarkable. Intact cervical ROM. Negative Spurling's test    Physical Exam:                       RIGHT                                     LEFT     Scap Dyskinesis/Winging       (-)                                             (-)     Tenderness:                                                                              Greater Tuberosity                  (-)                                             (-)  Bicipital Groove                       +                                              (-)  AC joint                                   (-)                                             (-)  Other:      ROM:  Forward Elevation       140                                          180  Abduction                    90                                          120  ER (at side)                 50                                            60  IR                                 L5                                           T8     Strength:   Supraspinatus             4+/5                                           5/5  Infraspinatus               4+/5                                           5/5  Subscap / IR               5/5                                           5/5      Special Tests:              Neer:                                       (-)                                             (-)              Moseley:                                 +                                              (-)              SS Stress:                               +                                              (-)              Bear Hug:                                (-)                                             (-)              Muir's:                                 +                                              (-)              Resisted Thrower's:                +                                              (-)              Cross Arm Abduction:             (-)                                             (-)     Neurovascular examination  - Motor grossly intact bilaterally to shoulder abduction, elbow flexion and extension, wrist flexion and extension, and intrinsic hand musculature  - Sensation intact to light touch bilaterally in axillary, median, radial, and ulnar distributions  - Symmetrical radial pulses    Imaging:    XR Results:  Results for orders placed during the hospital encounter of  03/21/22    X-ray Shoulder 2 or More Views Right    Narrative  EXAMINATION:  XR SHOULDER COMPLETE 2 OR MORE VIEWS RIGHT    CLINICAL HISTORY:  Impingement syndrome of right shoulder    TECHNIQUE:  Two or three views of the right shoulder were performed.    COMPARISON:  Right shoulder radiographs May 19, 2020    FINDINGS:  No right shoulder fracture.  Alignment is normal.  Acromioclavicular and glenohumeral joint spaces are within normal limits.  Subtle calcifications noted along the lateral aspect of the humeral head suggestive of mild calcific tendinopathy.  No osseous erosion or suspicious osseous lesion.  Visualized lung is clear.    Impression  As above.      Electronically signed by: Ronaldo Aburto  Date:    03/21/2022  Time:    07:45    MRI Results:    MRI SHOULDER WITHOUT CONTRAST RIGHT     CLINICAL HISTORY:  Shoulder pain, bursitis suspected, nondiagnostic xray;  Pain in right shoulder     TECHNIQUE:  Multisequence, multiplanar MR imaging of the shoulder performed without contrast.     COMPARISON:  05/19/2020 radiograph     FINDINGS:  Rotator cuff:     Supraspinatus/infraspinatus: Tendinosis present with possible intrasubstance interstitial tearing at the posterior supraspinatus conjoint tendon junction.     Subscapularis: Intact     Teres minor: Intact     Muscle bulk is maintained.     Labrum: Glenoid labrum is intact     Biceps: Long head biceps tendon is intact.     Bone: No fracture present.  Red marrow conversion findings present which can be associated with multiple causes including anemia, sickle cell, smoking and obesity.     Acromioclavicular joint:Mild to moderate degenerative findings.     Cartilage: Glenohumeral joint demonstrates age expected loss.     Miscellaneous: No significant joint effusion.  Trace subacromial/subdeltoid bursal fluid present.  Small amount of fluid also present within the subcoracoid bursa.     Impression:     1. Supraspinatus/infraspinatus tendinosis with possible  short-segment intrasubstance interstitial tearing.  2. Suspected mild subacromial/subdeltoid bursitis.  Small amount of subcoracoid bursal fluid as well.  3. AC joint degenerative hypertrophy findings.  4. Other findings as above.        Electronically signed by: Joel Gutierrez MD  Date:                                            07/19/2020  Time:                                           11:41    CT Results:  No results found for this or any previous visit.      Physician read: I agree with the above impression.    Assessment/Plan:   Yolanda Betts is a 52 y.o. female with chronic right shoulder pain, suspected rotator cuff tear    Plan:    Discussed diagnosis and treatment options with her today. She has persisted pain in the shoulder in spite of extensive non-operative management.   Her pain has been worsening over the last few years and current symptoms are consistent with rotator cuff tear.  I would like to obtain a new MRI of her right shoulder to re-evaluate her rotator cuff.   Follow up after MRI.           Ayush Kirkpatrick MD    I, Feroz Marin, acted as a scribe for Ayush Kirkpatrick MD for the duration of this office visit.

## 2022-09-29 ENCOUNTER — HOSPITAL ENCOUNTER (OUTPATIENT)
Dept: RADIOLOGY | Facility: HOSPITAL | Age: 52
Discharge: HOME | End: 2022-09-29
Attending: NURSE PRACTITIONER
Payer: MEDICAID

## 2022-09-29 VITALS — WEIGHT: 177 LBS | HEIGHT: 56 IN | BODY MASS INDEX: 39.82 KG/M2

## 2022-09-29 DIAGNOSIS — Z12.31 BREAST CANCER SCREENING BY MAMMOGRAM: ICD-10-CM

## 2022-09-29 PROCEDURE — 77067 MAMMO DIGITAL SCREENING BILAT WITH TOMO: ICD-10-PCS | Mod: 26,,, | Performed by: RADIOLOGY

## 2022-09-29 PROCEDURE — 77063 BREAST TOMOSYNTHESIS BI: CPT | Mod: TC

## 2022-09-29 PROCEDURE — 77067 SCR MAMMO BI INCL CAD: CPT | Mod: 26,,, | Performed by: RADIOLOGY

## 2022-09-29 PROCEDURE — 77063 BREAST TOMOSYNTHESIS BI: CPT | Mod: 26,,, | Performed by: RADIOLOGY

## 2022-09-29 PROCEDURE — 77067 SCR MAMMO BI INCL CAD: CPT | Mod: TC

## 2022-09-29 PROCEDURE — 77063 MAMMO DIGITAL SCREENING BILAT WITH TOMO: ICD-10-PCS | Mod: 26,,, | Performed by: RADIOLOGY

## 2022-10-05 ENCOUNTER — TELEPHONE (OUTPATIENT)
Dept: GASTROENTEROLOGY | Facility: CLINIC | Age: 52
End: 2022-10-05
Payer: MEDICAID

## 2022-10-05 NOTE — TELEPHONE ENCOUNTER
Pt requesting refill of Trulance. Pt also stated she is having heartburn that is waking her up at night for the last few weeks. Please advise if there something she can take for it or does she need to be seen in clinic?

## 2022-10-05 NOTE — TELEPHONE ENCOUNTER
----- Message from Vira Sandoval LPN sent at 10/5/2022  4:43 PM CDT -----  Contact: marj    ----- Message -----  From: Lakisha Miller  Sent: 10/5/2022   4:00 PM CDT  To: [facility] Staff    .Type:  Patient Returning Call    Who Called: Marj   Who Left Message for Patient: AMINA VALENTINO  Does the patient know what this is regarding?: no  Would the patient rather a call back or a response via [facility]? call  Best Call Back Number: [phone]   Additional Information: The patient returned the missed call and would like to be contacted again today please

## 2022-10-05 NOTE — TELEPHONE ENCOUNTER
----- Message from Josselyn Stringer sent at 10/5/2022 11:20 AM CDT -----  Regarding: heartburn  Contact: patient  Patient needs to speak to nurse about her meds and he severe heartburn,  states it waking her up at night, please call her back at [phone]

## 2022-10-06 ENCOUNTER — TELEPHONE (OUTPATIENT)
Dept: GASTROENTEROLOGY | Facility: CLINIC | Age: 52
End: 2022-10-06
Payer: MEDICAID

## 2022-10-06 RX ORDER — PLECANATIDE 3 MG/1
3 TABLET ORAL DAILY
Qty: 90 TABLET | Refills: 0 | Status: SHIPPED | OUTPATIENT
Start: 2022-10-06 | End: 2023-01-10 | Stop reason: SDUPTHER

## 2022-10-06 NOTE — TELEPHONE ENCOUNTER
Left message informing patient that Dr Montes De Oca refilled her Trulance and suggests she take Prilosec over the counter for the heartburn she is experiencing.

## 2022-10-06 NOTE — TELEPHONE ENCOUNTER
Pt called back requesting a prescription medication for her heartburn because the OTC medication is too expensive.

## 2022-10-07 ENCOUNTER — PATIENT MESSAGE (OUTPATIENT)
Dept: GASTROENTEROLOGY | Facility: CLINIC | Age: 52
End: 2022-10-07
Payer: MEDICAID

## 2022-10-07 RX ORDER — OMEPRAZOLE 20 MG/1
20 CAPSULE, DELAYED RELEASE ORAL
Qty: 180 CAPSULE | Refills: 0 | Status: SHIPPED | OUTPATIENT
Start: 2022-10-07 | End: 2023-01-26

## 2022-10-11 LAB
HLA B27 INTERPRETATION: NORMAL
HLA-B27 RELATED AG QL: NEGATIVE
HLA-B27 RELATED AG QL: NORMAL

## 2022-10-14 ENCOUNTER — TELEPHONE (OUTPATIENT)
Dept: GASTROENTEROLOGY | Facility: CLINIC | Age: 52
End: 2022-10-14
Payer: MEDICAID

## 2022-10-14 NOTE — TELEPHONE ENCOUNTER
----- Message from Marty Oliver sent at 10/14/2022  9:53 AM CDT -----  Contact: Yolanda  Patient is calling to speak with the nurse regarding stomach pains. Reports calling last week and medication was prescribed medication but it isn't helping. Patient stated she is in a lot of pain when she eats and is requesting to be seen and call back at .[phone]

## 2022-10-14 NOTE — TELEPHONE ENCOUNTER
Returned call to pt who stated she started Prilosec on 10/7/22 but it is not helping. Pt continues to experience severe heartburn all day and night. Please advise

## 2022-10-17 ENCOUNTER — TELEPHONE (OUTPATIENT)
Dept: PRIMARY CARE CLINIC | Facility: CLINIC | Age: 52
End: 2022-10-17
Payer: MEDICAID

## 2022-10-17 NOTE — TELEPHONE ENCOUNTER
----- Message from Reba Kirk sent at 10/17/2022  1:45 PM CDT -----  Contact: Patient, [phone]  Calling to speak the nurse regarding she went to the ER Friday. Please call her. Thanks.

## 2022-10-19 ENCOUNTER — OFFICE VISIT (OUTPATIENT)
Dept: PRIMARY CARE CLINIC | Facility: CLINIC | Age: 52
End: 2022-10-19
Payer: MEDICAID

## 2022-10-19 DIAGNOSIS — K59.04 CHRONIC IDIOPATHIC CONSTIPATION: ICD-10-CM

## 2022-10-19 DIAGNOSIS — R11.0 NAUSEA: Primary | ICD-10-CM

## 2022-10-19 DIAGNOSIS — R10.9 ABDOMINAL PAIN, UNSPECIFIED ABDOMINAL LOCATION: ICD-10-CM

## 2022-10-19 PROCEDURE — 3061F NEG MICROALBUMINURIA REV: CPT | Mod: CPTII,95,, | Performed by: NURSE PRACTITIONER

## 2022-10-19 PROCEDURE — 3072F PR LOW RISK FOR RETINOPATHY: ICD-10-PCS | Mod: CPTII,95,, | Performed by: NURSE PRACTITIONER

## 2022-10-19 PROCEDURE — 4010F ACE/ARB THERAPY RXD/TAKEN: CPT | Mod: CPTII,95,, | Performed by: NURSE PRACTITIONER

## 2022-10-19 PROCEDURE — 3044F HG A1C LEVEL LT 7.0%: CPT | Mod: CPTII,95,, | Performed by: NURSE PRACTITIONER

## 2022-10-19 PROCEDURE — 99213 PR OFFICE/OUTPT VISIT, EST, LEVL III, 20-29 MIN: ICD-10-PCS | Mod: 95,,, | Performed by: NURSE PRACTITIONER

## 2022-10-19 PROCEDURE — 3044F PR MOST RECENT HEMOGLOBIN A1C LEVEL <7.0%: ICD-10-PCS | Mod: CPTII,95,, | Performed by: NURSE PRACTITIONER

## 2022-10-19 PROCEDURE — 3066F NEPHROPATHY DOC TX: CPT | Mod: CPTII,95,, | Performed by: NURSE PRACTITIONER

## 2022-10-19 PROCEDURE — 3061F PR NEG MICROALBUMINURIA RESULT DOCUMENTED/REVIEW: ICD-10-PCS | Mod: CPTII,95,, | Performed by: NURSE PRACTITIONER

## 2022-10-19 PROCEDURE — 3066F PR DOCUMENTATION OF TREATMENT FOR NEPHROPATHY: ICD-10-PCS | Mod: CPTII,95,, | Performed by: NURSE PRACTITIONER

## 2022-10-19 PROCEDURE — 4010F PR ACE/ARB THEARPY RXD/TAKEN: ICD-10-PCS | Mod: CPTII,95,, | Performed by: NURSE PRACTITIONER

## 2022-10-19 PROCEDURE — 99213 OFFICE O/P EST LOW 20 MIN: CPT | Mod: 95,,, | Performed by: NURSE PRACTITIONER

## 2022-10-19 PROCEDURE — 3072F LOW RISK FOR RETINOPATHY: CPT | Mod: CPTII,95,, | Performed by: NURSE PRACTITIONER

## 2022-10-20 ENCOUNTER — HOSPITAL ENCOUNTER (OUTPATIENT)
Dept: RADIOLOGY | Facility: HOSPITAL | Age: 52
Discharge: HOME | End: 2022-10-20
Attending: STUDENT IN AN ORGANIZED HEALTH CARE EDUCATION/TRAINING PROGRAM
Payer: MEDICAID

## 2022-10-20 DIAGNOSIS — M75.101 NONTRAUMATIC TEAR OF RIGHT ROTATOR CUFF, UNSPECIFIED TEAR EXTENT: ICD-10-CM

## 2022-10-20 DIAGNOSIS — M25.511 CHRONIC RIGHT SHOULDER PAIN: ICD-10-CM

## 2022-10-20 DIAGNOSIS — G89.29 CHRONIC RIGHT SHOULDER PAIN: ICD-10-CM

## 2022-10-20 PROCEDURE — 73221 MRI JOINT UPR EXTREM W/O DYE: CPT | Mod: TC,RT

## 2022-10-20 PROCEDURE — 73221 MRI JOINT UPR EXTREM W/O DYE: CPT | Mod: 26,RT,, | Performed by: RADIOLOGY

## 2022-10-20 PROCEDURE — 73221 MRI SHOULDER WITHOUT CONTRAST RIGHT: ICD-10-PCS | Mod: 26,RT,, | Performed by: RADIOLOGY

## 2022-10-26 NOTE — PROGRESS NOTES
Subjective:       Patient ID: Yolanda Betts is a 52 y.o. female.    Chief Complaint: Headache, Constipation, nausea/vomiting.   The patient location is: [place], [place]    Visit type: audiovisual    Face to Face time with patient: 11 min  12  minutes of total time spent on the encounter, which includes face to face time and non-face to face time preparing to see the patient (eg, review of tests), Obtaining and/or reviewing separately obtained history, Documenting clinical information in the electronic or other health record, Independently interpreting results (not separately reported) and communicating results to the patient/family/caregiver, or Care coordination (not separately reported).         Each patient to whom he or she provides medical services by telemedicine is:  (1) informed of the relationship between the physician and patient and the respective role of any other health care provider with respect to management of the patient; and (2) notified that he or she may decline to receive medical services by telemedicine and may withdraw from such care at any time.    Notes:    History of Present Illness:   Yolanda Betts 52 y.o. female presents today with reports of nausea, constipation, abdominal pain and joint pain. Treatment options and alternatives were discussed with the patient. Patient provided opportunity to ask additional questions.  All questions were answered. Voices understanding and acceptance of this advice. Instructed to call back if any further questions or concerns.      Past Medical History:   Diagnosis Date    Abnormal Pap smear of cervix     HPV genital warts    Anemia     Anxiety     Arthritis     Asthma 10/11/2016    Bipolar 1 disorder     Diabetes mellitus, type 2     Dyslipidemia associated with type 2 diabetes mellitus 5/13/2019    General anesthetics causing adverse effect in therapeutic use     Genital warts     GERD (gastroesophageal reflux disease)     Herpes simplex  "virus (HSV) infection     Hyperlipidemia     Hypertension     Hypertension complicating diabetes 5/5/2019    Migraine with aura and without status migrainosus, not intractable 3/21/2016    Mild persistent asthma without complication 10/11/2016    Morbid obesity with body mass index (BMI) of 45.0 to 49.9 in adult 8/3/2017    Obstructive sleep apnea     ALEN (obstructive sleep apnea)     2L per N/C q HS    Schizoaffective disorder, bipolar type 4/25/2019    Seasonal allergic rhinitis due to pollen 5/13/2019    Type 2 diabetes mellitus with hyperglycemia, with long-term current use of insulin 12/21/2017    Type 2 diabetes mellitus with hyperglycemia, without long-term current use of insulin 12/21/2017     Family History   Problem Relation Age of Onset    Diabetes Mother     Hyperlipidemia Mother     Hypertension Mother     Asthma Mother     COPD Mother     Glaucoma Mother     Thyroid disease Mother     Anesthesia problems Mother         "almost had a cardiac arrest" , blood clots    Hypertension Father     Hyperlipidemia Father     Cancer Father         Brain, lung, liver, kidney    Heart disease Maternal Grandmother     Hyperlipidemia Maternal Grandmother     Hypertension Maternal Grandmother     Cataracts Maternal Grandmother     Diabetes Maternal Grandmother     Heart disease Maternal Grandfather     Hyperlipidemia Maternal Grandfather     Hypertension Maternal Grandfather     Glaucoma Maternal Grandfather     Cancer Maternal Grandfather     Cataracts Maternal Grandfather     Macular degeneration Maternal Grandfather     Diabetes Maternal Grandfather     Heart disease Paternal Grandmother     Hyperlipidemia Paternal Grandmother     Hypertension Paternal Grandmother     Cataracts Paternal Grandmother     Heart disease Paternal Grandfather     Hyperlipidemia Paternal Grandfather     Hypertension Paternal Grandfather     Cataracts Paternal Grandfather     Breast cancer Maternal Cousin     Breast cancer Maternal Cousin  "    Breast cancer Maternal Cousin     Breast cancer Maternal Cousin      Social History     Socioeconomic History    Marital status: Single   Tobacco Use    Smoking status: Never    Smokeless tobacco: Never   Substance and Sexual Activity    Alcohol use: Yes     Alcohol/week: 0.0 standard drinks     Comment: socially  No alcohol 72h prior to sx    Drug use: No    Sexual activity: Yes     Partners: Male     Birth control/protection: Surgical     Comment: hyst   Social History Narrative    Long-term care nurse     Social Determinants of Health     Financial Resource Strain: High Risk    Difficulty of Paying Living Expenses: Hard   Food Insecurity: Food Insecurity Present    Worried About Running Out of Food in the Last Year: Often true    Ran Out of Food in the Last Year: Often true   Transportation Needs: No Transportation Needs    Lack of Transportation (Medical): No    Lack of Transportation (Non-Medical): No   Physical Activity: Inactive    Days of Exercise per Week: 0 days    Minutes of Exercise per Session: 0 min   Stress: Stress Concern Present    Feeling of Stress : Rather much   Social Connections: Moderately Isolated    Frequency of Communication with Friends and Family: Twice a week    Frequency of Social Gatherings with Friends and Family: Twice a week    Attends L[employer] day Services: More than 4 times per year    Active Member of Clubs or Organizations: No    Attends Club or Organization Meetings: Never    Marital Status: Never    Housing Stability: High Risk    Unable to Pay for Housing in the Last Year: Yes    Number of Places Lived in the Last Year: 1    Unstable Housing in the Last Year: No     Outpatient Encounter Medications as of 10/19/2022   Medication Sig Dispense Refill    albuterol (PROVENTIL/VENTOLIN HFA) 90 mcg/actuation inhaler Inhale 2 puffs into the lungs every 6 hours as needed for wheezing 8.5 g 3    amLODIPine (NORVASC) 10 MG tablet Take 10 mg by mouth every morning.       "amlodipine-valsartan (EXFORGE)  mg per tablet Take 1 tablet by mouth once daily. 90 tablet 1    aspirin (ECOTRIN) 81 MG EC tablet Take 81 mg by mouth once daily.      atorvastatin (LIPITOR) 20 MG tablet Take 1 tablet (20 mg total) by mouth every evening. 90 tablet 3    BD INSULIN SYRINGE ULTRA-FINE 1/2 mL 30 gauge x 1/2" Syrg   0    blood sugar diagnostic (ONETOUCH ULTRA TEST) Strp Check blood glucose 4 times daily as directed and as needed (dispense insurance preferred brand or patient choice) 200 each 5    blood sugar diagnostic Strp 1 each by Misc.(Non-Drug; Combo Route) route 4 (four) times daily. [facility] Glucose Test Strips 400 strip 3    blood-glucose meter Misc Use as directed (Patient taking differently: Use as directed) 1 each 0    budesonide-formoterol 160-4.5 mcg (SYMBICORT) 160-4.5 mcg/actuation HFAA Inhale 2 puffs into the lungs every 12 (twelve) hours. Controller : Use spacer 10.2 g 6    clonazePAM (KLONOPIN) 1 MG tablet Take 1 tablet (1 mg total) by mouth once daily. 30 tablet 2    cycloSPORINE 0.05 % Drop Place 1 drop into both eyes 2 (two) times daily. 5.5 mL 11    DULoxetine (CYMBALTA) 60 MG capsule Take 1 capsule (60 mg total) by mouth once daily. 30 capsule 2    ergocalciferol (ERGOCALCIFEROL) 50,000 unit Cap Take 1 capsule twice weekly for 3 weeks then weekly 12 capsule 3    fenofibrate (TRICOR) 145 MG tablet Take 1 tablet (145 mg total) by mouth once daily. 90 tablet 1    flash glucose scanning reader (FREESTYLE AMY 2 READER) Misc Use as directed to check blood sugar 1 each 1    fluticasone propionate (FLONASE) 50 mcg/actuation nasal spray 2 sprays (100 mcg total) by Each Nostril route once daily. 16 g 0    frovatriptan (FROVA) 2.5 MG tablet Take 1 tablet at onset of acute migraine. May take 1 more tablet 2 hours later if needed. (MAX 2 TABLET PER DAY. MAX 4 TABLETS PER WEEK.) 9 tablet 1    furosemide (LASIX) 20 MG tablet Take 1 tablet (20 mg total) by mouth once daily. 30 tablet 11    " "HEALTHYLAX 17 gram PwPk Take 17 g by mouth 2 (two) times daily.      insulin aspart U-100 (NOVOLOG FLEXPEN U-100 INSULIN) 100 unit/mL (3 mL) InPn pen Inject 10 Units into the skin 3 (three) times daily before meals. Use based on sliding scale 15 mL 3    insulin glargine U-300 conc (TOUJEO MAX U-300 SOLOSTAR) 300 unit/mL (3 mL) insulin pen Inject 100 Units into the skin once daily. 4 pen 3    lancets (ONETOUCH DELICA PLUS LANCET) 30 gauge Misc Use as directed 3 times daily 100 each 11    levocetirizine (XYZAL) 5 MG tablet Take 1 tablet (5 mg total) by mouth every evening. 30 tablet 11    LIDOcaine-prilocaine (EMLA) cream Apply topically up to 4 times per day to affected area for pain relief 60 g 0    lurasidone (LATUDA) 60 mg Tab tablet Take 1 tablet by mouth daily with 350 calories 30 tablet 2    magnesium oxide (MAG-OX) 400 mg (241.3 mg magnesium) tablet Take 1 tablet (400 mg total) by mouth once daily. 90 tablet 0    metFORMIN (GLUCOPHAGE) 1000 MG tablet Take 1 tablet (1,000 mg total) by mouth 2 (two) times daily with meals. 180 tablet 0    metoprolol succinate (TOPROL-XL) 50 MG 24 hr tablet Take 1 tablet (50 mg total) by mouth every evening. 90 tablet 0    naltrexone capsule Take 1 capsule (4.5 mg total) by mouth every evening. 90 capsule 2    omeprazole (PRILOSEC) 20 MG capsule Take 1 capsule (20 mg total) by mouth 2 (two) times daily before meals. 180 capsule 0    pen needle, diabetic (BD ULTRA-FINE MINI PEN NEEDLE) 31 gauge x 3/16" Ndle Use 5 a day 200 each 3    pen needle, diabetic (BD ULTRA-FINE MINI PEN NEEDLE) 31 gauge x 3/16" Ndle Use one needle 5 times a day 200 each 11    plecanatide (TRULANCE) 3 mg Tab Take 3 mg by mouth once daily. 90 tablet 0    [] predniSONE (DELTASONE) 5 MG tablet Take 2 tablets (10 mg total) by mouth once daily for 5 days, THEN 1 tablet (5 mg total) once daily for 25 days. 35 tablet 0    spironolactone (ALDACTONE) 25 MG tablet Take 1 tablet (25 mg total) by mouth once " daily. 30 tablet 6     No facility-administered encounter medications on file as of 10/19/2022.       Review of Systems   Constitutional:  Negative for fever.   Gastrointestinal:  Positive for abdominal pain, constipation and nausea. Negative for diarrhea and vomiting.   Genitourinary:  Negative for dysuria, frequency and hematuria.   Musculoskeletal:  Positive for arthralgias. Negative for myalgias.   Neurological:  Positive for headaches.     Objective:      There were no vitals taken for this visit.  Physical Exam  Constitutional:       Appearance: Normal appearance.   Cardiovascular:      Heart sounds: No murmur heard.  Neurological:      Mental Status: She is alert.       Results for orders placed or performed in visit on 09/21/22   Lipid Panel   Result Value Ref Range    Cholesterol 131 120 - 199 mg/dL    Triglycerides 246 (H) 30 - 150 mg/dL    HDL 38 (L) 40 - 75 mg/dL    LDL Cholesterol 43.8 (L) 63.0 - 159.0 mg/dL    HDL/Cholesterol Ratio 29.0 20.0 - 50.0 %    Total Cholesterol/HDL Ratio 3.4 2.0 - 5.0    Non-HDL Cholesterol 93 mg/dL   Hemoglobin A1C   Result Value Ref Range    Hemoglobin A1C 6.4 (H) 4.0 - 5.6 %    Estimated Avg Glucose 137 (H) 68 - 131 mg/dL   Microalbumin/creatinine urine ratio   Result Value Ref Range    Microalbumin, Urine 5.0 ug/mL    Creatinine, Urine 135.0 15.0 - 325.0 mg/dL    Microalb/Creat Ratio 3.7 0.0 - 30.0 ug/mg   HLA B27 Antigen   Result Value Ref Range    HLA B27 Interpretation SAPE: 211  TAQ: [id number]       B27 Testing Date 10/07/2022 07:47 AM     HLA B27 Result Negative    CBC Auto Differential   Result Value Ref Range    WBC 8.66 3.90 - 12.70 K/uL    RBC 4.19 4.00 - 5.40 M/uL    Hemoglobin 11.3 (L) 12.0 - 16.0 g/dL    Hematocrit 36.0 (L) 37.0 - 48.5 %    MCV 86 82 - 98 fL    MCH 27.0 27.0 - 31.0 pg    MCHC 31.4 (L) 32.0 - 36.0 g/dL    RDW 14.6 (H) 11.5 - 14.5 %    Platelets 566 (H) 150 - 450 K/uL    MPV 9.5 9.2 - 12.9 fL    Immature Granulocytes 0.2 0.0 - 0.5 %    Gran # (ANC)  4.3 1.8 - 7.7 K/uL    Immature Grans (Abs) 0.02 0.00 - 0.04 K/uL    Lymph # 3.5 1.0 - 4.8 K/uL    Mono # 0.6 0.3 - 1.0 K/uL    Eos # 0.2 0.0 - 0.5 K/uL    Baso # 0.07 0.00 - 0.20 K/uL    nRBC 0 0 /100 WBC    Gran % 50.0 38.0 - 73.0 %    Lymph % 40.2 18.0 - 48.0 %    Mono % 6.5 4.0 - 15.0 %    Eosinophil % 2.3 0.0 - 8.0 %    Basophil % 0.8 0.0 - 1.9 %    Differential Method Automated    Comprehensive Metabolic Panel   Result Value Ref Range    Sodium 140 136 - 145 mmol/L    Potassium 4.8 3.5 - 5.1 mmol/L    Chloride 105 95 - 110 mmol/L    CO2 27 23 - 29 mmol/L    Glucose 140 (H) 70 - 110 mg/dL    BUN 19 6 - 20 mg/dL    Creatinine 0.9 0.5 - 1.4 mg/dL    Calcium 9.8 8.7 - 10.5 mg/dL    Total Protein 7.1 6.0 - 8.4 g/dL    Albumin 4.0 3.5 - 5.2 g/dL    Total Bilirubin 0.2 0.1 - 1.0 mg/dL    Alkaline Phosphatase 59 55 - 135 U/L    AST 11 10 - 40 U/L    ALT 14 10 - 44 U/L    Anion Gap 8 8 - 16 mmol/L    eGFR >60 >60 mL/min/1.73 m^2   Sedimentation rate   Result Value Ref Range    Sed Rate 18 0 - 36 mm/Hr   C-Reactive Protein   Result Value Ref Range    CRP 12.9 (H) 0.0 - 8.2 mg/L     *Note: Due to a large number of results and/or encounters for the requested time period, some results have not been displayed. A complete set of results can be found in Results Review.     Assessment:       1. Nausea    2. Chronic idiopathic constipation    3. Abdominal pain, unspecified abdominal location        Plan:   Nausea    Chronic idiopathic constipation    Abdominal pain, unspecified abdominal location           [facility]   [address]  [place] [address]  Office [phone]  Fax [phone]

## 2022-11-01 ENCOUNTER — PATIENT MESSAGE (OUTPATIENT)
Dept: PSYCHIATRY | Facility: CLINIC | Age: 52
End: 2022-11-01
Payer: MEDICAID

## 2022-12-01 ENCOUNTER — TELEPHONE (OUTPATIENT)
Dept: RHEUMATOLOGY | Facility: CLINIC | Age: 52
End: 2022-12-01
Payer: MEDICAID

## 2022-12-01 ENCOUNTER — TELEPHONE (OUTPATIENT)
Dept: PAIN MEDICINE | Facility: CLINIC | Age: 52
End: 2022-12-01
Payer: MEDICAID

## 2022-12-01 NOTE — TELEPHONE ENCOUNTER
----- Message from Marcelina Nation sent at 12/1/2022  9:51 AM CST -----  Pt is requesting a call back in regards to getting an follow up appt schedule.due to pt insurance I wasn't able to get any appt to pull up. Pt can be reached at [phone]

## 2022-12-05 NOTE — PROGRESS NOTES
Orthopaedic Follow-Up Visit    Last Appointment: 9/28/22  Diagnosis: Chronic right shoulder pain, suspected rotator cuff tear  Prior Procedure: MRI     Yolanda Betts is a 52 y.o. female who is here for f/u evaluation of her right shoulder. The patient was last seen here by me on 9/28/22 at which point we decided to send her for an updated MRI of the right shoulder to re-evaluate the status of her rotator cuff tear that was seen on prior MRI due to her continued worsening symptoms. The patient returns today to review her MRI results and reports that the symptoms *** and is interested in discussing further treatment options.     To review her history, Yolanda Betts is a 52 y.o. right-hand dominant female who presented with right shoulder pain and dysfunction that started several  years ago with no specific injury, but reported a gradual onset of symptoms. She had a prior MRI that showed some partial articular sided tearing. At her last visit on 2/9/21 we proceeded with right shoulder glenohumeral corticosteroid injection and continuation of physical therapy. She returned with continued worsening pain in her shoulder so  a new MRI was ordered to evaluate the status of her rotator cuff tear.     Patient's medications, allergies, past medical, surgical, social and family histories were reviewed and updated as appropriate.    Review of Systems   All systems reviewed were negative.  Specifically, the patient denies fever, chills, weight loss, chest pain, shortness of breath, or dyspnea on exertion.      Past Medical History:   Diagnosis Date    Abnormal Pap smear of cervix     HPV genital warts    Anemia     Anxiety     Arthritis     Asthma 10/11/2016    Bipolar 1 disorder     Diabetes mellitus, type 2     Dyslipidemia associated with type 2 diabetes mellitus 5/13/2019    General anesthetics causing adverse effect in therapeutic use     Genital warts     GERD (gastroesophageal reflux disease)     Herpes simplex  virus (HSV) infection     Hyperlipidemia     Hypertension     Hypertension complicating diabetes 2019    Migraine with aura and without status migrainosus, not intractable 3/21/2016    Mild persistent asthma without complication 10/11/2016    Morbid obesity with body mass index (BMI) of 45.0 to 49.9 in adult 8/3/2017    Obstructive sleep apnea     ALEN (obstructive sleep apnea)     2L per N/C q HS    Schizoaffective disorder, bipolar type 2019    Seasonal allergic rhinitis due to pollen 2019    Type 2 diabetes mellitus with hyperglycemia, with long-term current use of insulin 2017    Type 2 diabetes mellitus with hyperglycemia, without long-term current use of insulin 2017       Past Surgical History:   Procedure Laterality Date    abcess removed right back      BELT ABDOMINOPLASTY      BREAST SURGERY Bilateral 1996    Reduction     SECTION      X 2    COLONOSCOPY      COLONOSCOPY N/A 2018    Procedure: colonoscopy for iron deficiency anemia;  Surgeon: Frankie Sanchez MD;  Location: [facility] ENDO;  Service: Endoscopy;  Laterality: N/A;    COLONOSCOPY N/A 2018    Procedure: COLONOSCOPY;  Surgeon: Frankie Sanchez MD;  Location: [facility] ENDO;  Service: Endoscopy;  Laterality: N/A;    HYSTERECTOMY      w/ BSO; hypermenorrhea    MOUTH SURGERY      OOPHORECTOMY      hyst/bso, hypermenorrhea    ROBOT-ASSISTED CHOLECYSTECTOMY USING DA DARI XI N/A 1/3/2020    Procedure: XI ROBOTIC CHOLECYSTECTOMY;  Surgeon: Damir Driscoll MD;  Location: [facility] OR;  Service: General;  Laterality: N/A;    TOTAL REDUCTION MAMMOPLASTY      TUBAL LIGATION         Patient's Medications   New Prescriptions    No medications on file   Previous Medications    ALBUTEROL (PROVENTIL/VENTOLIN HFA) 90 MCG/ACTUATION INHALER    Inhale 2 puffs into the lungs every 6 hours as needed for wheezing    AMLODIPINE (NORVASC) 10 MG TABLET    Take 10 mg by mouth every morning.    AMLODIPINE-VALSARTAN (EXFORGE)  " MG PER TABLET    Take 1 tablet by mouth once daily.    ASPIRIN (ECOTRIN) 81 MG EC TABLET    Take 81 mg by mouth once daily.    ATORVASTATIN (LIPITOR) 20 MG TABLET    Take 1 tablet (20 mg total) by mouth every evening.    BD INSULIN SYRINGE ULTRA-FINE 1/2 ML 30 GAUGE X 1/2" SYRG        BLOOD SUGAR DIAGNOSTIC (ONETOUCH ULTRA TEST) STRP    Check blood glucose 4 times daily as directed and as needed (dispense insurance preferred brand or patient choice)    BLOOD SUGAR DIAGNOSTIC STRP    1 each by Misc.(Non-Drug; Combo Route) route 4 (four) times daily. [facility] Glucose Test Strips    BLOOD-GLUCOSE METER MISC    Use as directed    BUDESONIDE-FORMOTEROL 160-4.5 MCG (SYMBICORT) 160-4.5 MCG/ACTUATION HFAA    Inhale 2 puffs into the lungs every 12 (twelve) hours. Controller : Use spacer    CLONAZEPAM (KLONOPIN) 1 MG TABLET    Take 1 tablet (1 mg total) by mouth once daily.    CYCLOSPORINE 0.05 % DROP    Place 1 drop into both eyes 2 (two) times daily.    DICLOFENAC SODIUM (VOLTAREN) 1 % GEL    Apply 2 grams topically 4 (four) times daily as needed.    DULOXETINE (CYMBALTA) 60 MG CAPSULE    Take 1 capsule (60 mg total) by mouth once daily.    ERGOCALCIFEROL (ERGOCALCIFEROL) 50,000 UNIT CAP    Take 1 capsule twice weekly for 3 weeks then weekly    FENOFIBRATE (TRICOR) 145 MG TABLET    Take 1 tablet (145 mg total) by mouth once daily.    FLASH GLUCOSE SCANNING READER (FREESTYLE AMY 2 READER) [facility] [place]    Use as directed to check blood sugar    FLUTICASONE PROPIONATE (FLONASE) 50 MCG/ACTUATION NASAL SPRAY    2 sprays (100 mcg total) by Each Nostril route once daily.    FROVATRIPTAN (FROVA) 2.5 MG TABLET    Take 1 tablet at onset of acute migraine. May take 1 more tablet 2 hours later if needed. (MAX 2 TABLET PER DAY. MAX 4 TABLETS PER WEEK.)    FUROSEMIDE (LASIX) 20 MG TABLET    Take 1 tablet (20 mg total) by mouth once daily.    HEALTHYLAX 17 GRAM PWPK    Take 17 g by mouth 2 (two) times daily.    INSULIN ASPART U-100 " "(NOVOLOG FLEXPEN U-100 INSULIN) 100 UNIT/ML (3 ML) INPN PEN    Inject 10 Units into the skin 3 (three) times daily before meals. Use based on sliding scale    INSULIN GLARGINE U-300 CONC (TOUJEO MAX U-300 SOLOSTAR) 300 UNIT/ML (3 ML) INSULIN PEN    Inject 100 Units into the skin once daily.    LANCETS (ONETOUCH DELICA PLUS LANCET) 30 GAUGE MISC    Use as directed 3 times daily    LEVOCETIRIZINE (XYZAL) 5 MG TABLET    Take 1 tablet (5 mg total) by mouth every evening.    LIDOCAINE-PRILOCAINE (EMLA) CREAM    Apply topically up to 4 times per day to affected area for pain relief    LURASIDONE (LATUDA) 60 MG TAB TABLET    Take 1 tablet by mouth daily with 350 calories    METFORMIN (GLUCOPHAGE) 1000 MG TABLET    Take 1 tablet (1,000 mg total) by mouth 2 (two) times daily with meals.    METOPROLOL SUCCINATE (TOPROL-XL) 50 MG 24 HR TABLET    Take 1 tablet (50 mg total) by mouth every evening.    NALTREXONE CAPSULE    Take 1 capsule (4.5 mg total) by mouth every evening.    OMEPRAZOLE (PRILOSEC) 20 MG CAPSULE    Take 1 capsule (20 mg total) by mouth 2 (two) times daily before meals.    PEN NEEDLE, DIABETIC (BD ULTRA-FINE MINI PEN NEEDLE) 31 GAUGE X 3/16" NDLE    Use 5 a day    PEN NEEDLE, DIABETIC (BD ULTRA-FINE MINI PEN NEEDLE) 31 GAUGE X 3/16" NDLE    Use one needle 5 times a day    PLECANATIDE (TRULANCE) 3 MG TAB    Take 3 mg by mouth once daily.    SPIRONOLACTONE (ALDACTONE) 25 MG TABLET    Take 1 tablet (25 mg total) by mouth once daily.   Modified Medications    No medications on file   Discontinued Medications    No medications on file       Family History   Problem Relation Age of Onset    Diabetes Mother     Hyperlipidemia Mother     Hypertension Mother     Asthma Mother     COPD Mother     Glaucoma Mother     Thyroid disease Mother     Anesthesia problems Mother         "almost had a cardiac arrest" , blood clots    Hypertension Father     Hyperlipidemia Father     Cancer Father         Brain, lung, liver, kidney "    Heart disease Maternal Grandmother     Hyperlipidemia Maternal Grandmother     Hypertension Maternal Grandmother     Cataracts Maternal Grandmother     Diabetes Maternal Grandmother     Heart disease Maternal Grandfather     Hyperlipidemia Maternal Grandfather     Hypertension Maternal Grandfather     Glaucoma Maternal Grandfather     Cancer Maternal Grandfather     Cataracts Maternal Grandfather     Macular degeneration Maternal Grandfather     Diabetes Maternal Grandfather     Heart disease Paternal Grandmother     Hyperlipidemia Paternal Grandmother     Hypertension Paternal Grandmother     Cataracts Paternal Grandmother     Heart disease Paternal Grandfather     Hyperlipidemia Paternal Grandfather     Hypertension Paternal Grandfather     Cataracts Paternal Grandfather     Breast cancer Maternal Cousin     Breast cancer Maternal Cousin     Breast cancer Maternal Cousin     Breast cancer Maternal Cousin        Review of patient's allergies indicates:   Allergen Reactions    Codeine Itching    Hydromorphone Other (See Comments)     Can't wake up for long time if taken  Slow to wake up after surgery after receiving    Lyrica [pregabalin] Itching    Neuromuscular blockers, steroidal Hives     some    Latex, natural rubber Rash    Morphine Rash     itching    Norco [hydrocodone-acetaminophen] Itching, Rash and Hallucinations    Seconal [secobarbital sodium] Rash     itching    Tylox [oxycodone-acetaminophen] Rash         Objective:      Physical Exam  Patient is alert and oriented, no distress. Skin is intact. Neuro is normal with no focal motor or sensory findings.    Cervical exam is unremarkable. Intact cervical ROM. Negative Spurling's test    Physical Exam:  RIGHT    LEFT    Scap Dyskinesis/Winging (-)    (-)    Tenderness:          Greater Tuberosity  (-)    (-)  Bicipital Groove  (-)    (-)  AC joint   (-)    (-)  Other:     ROM:  Forward Elevation 180***    180***  Abduction  120***    120***  ER (at  side)  80***    80***  IR   T8***    T8***    Strength:   Supraspinatus  5/5    5/5  Infraspinatus  5/5    5/5  Subscap / IR  5/5    5/5     Special Tests:   Apprehension:   (-)    (-)   Gonzalez Relocation:  (-)    (-)   Jerk / Posterior Load:  (-)    (-)   Neer:    (-)    (-)   Moseley:   (-)    (-)   SS Stress:   (-)    (-)   Bear Hug:   (-)    (-)   Laurel's:   (-)    (-)   Resisted Thrower's:   (-)    (-)   Cross Arm Abduction:  (-)    (-)    Neurovascular examination  - Motor grossly intact bilaterally to shoulder abduction, elbow flexion and extension, wrist flexion and extension, and intrinsic hand musculature  - Sensation intact to light touch bilaterally in axillary, median, radial, and ulnar distributions  - Symmetrical radial pulses    Imaging:    XR Results:  Results for orders placed during the hospital encounter of 03/21/22    X-ray Shoulder 2 or More Views Right    Narrative  EXAMINATION:  XR SHOULDER COMPLETE 2 OR MORE VIEWS RIGHT    CLINICAL HISTORY:  Impingement syndrome of right shoulder    TECHNIQUE:  Two or three views of the right shoulder were performed.    COMPARISON:  Right shoulder radiographs May 19, 2020    FINDINGS:  No right shoulder fracture.  Alignment is normal.  Acromioclavicular and glenohumeral joint spaces are within normal limits.  Subtle calcifications noted along the lateral aspect of the humeral head suggestive of mild calcific tendinopathy.  No osseous erosion or suspicious osseous lesion.  Visualized lung is clear.    Impression  As above.      Electronically signed by: Ronaldo Aburto  Date:    03/21/2022  Time:    07:45      MRI Results:  Results for orders placed during the hospital encounter of 10/20/22    MRI Shoulder Without Contrast Right    Narrative  EXAMINATION:  MRI SHOULDER WITHOUT CONTRAST RIGHT    CLINICAL HISTORY:  Shoulder pain, rotator cuff disorder suspected, xray done; Pain in right shoulder    TECHNIQUE:  Right shoulder MRI without IV  contrast.    COMPARISON:  Right shoulder radiographs 03/21/2022, right shoulder MRI 07/18/2020    FINDINGS:  Moderate tendinosis diffusely affects the supraspinatus tendon, unchanged.  Focal area of T2 hyperintensity affecting posterior infraspinatus tendon near the greater tuberosity insertion suggests focal low-grade partial-thickness tear affecting 10-20% of articular sided fibers and measuring 4 mm AP dimension, new.  Teres minor and subscapularis tendons are normal.  Rotator cuff muscles show no significant fatty atrophy and maintain normal signal.    Long head of biceps tendon and biceps labral complex are intact.  Negative for labral tear.    Physiologic amount of fluid lies in glenohumeral joint.  No articular cartilage defect identified.    Minor AC joint degenerative changes are unchanged.  Type 2 acromion demonstrates mild lateral downsloping.  Small amount of fluid lies in the subacromial subdeltoid bursa.  The inferior glenohumeral ligaments are unremarkable.    Diffuse low T1 bone marrow signal alteration suggest red marrow recruitment, unchanged.    Impression  1. Moderate right supraspinatus tendinosis, unchanged.  2. Focal low-grade partial-thickness articular surface tear of posterior right infraspinatus tendon, new.  3. Minor right AC joint degenerative changes, unchanged      Electronically signed by: Alexander Lawrence MD  Date:    10/20/2022  Time:    18:49      CT Results:  No results found for this or any previous visit.      Physician read: I agree with the above impression.***    Assessment/Plan:   Yolanda Betts is a 52 y.o. female with right shoulder ***    Plan:    Reviewed MRI with the patient today. Her MRI shows ***  Follow up ***          Ayush Kirkpatrick MD    I, Feroz Marin, acted as a scribe for Ayush Kirkpatrick MD for the duration of this office visit.

## 2022-12-06 ENCOUNTER — TELEPHONE (OUTPATIENT)
Dept: PAIN MEDICINE | Facility: CLINIC | Age: 52
End: 2022-12-06
Payer: MEDICAID

## 2022-12-06 NOTE — TELEPHONE ENCOUNTER
Can you refill? Zanaflex 4 mg     Last Visit: 05/09/2022  Next Visit: 01/17/23  Last refill: 08/22  How pt patient is currently taking medication requested:   [facility]                   ----- Message from Sepideh Sanders, RT sent at 12/6/2022 10:27 AM CST -----  Patient is requesting a call back firm office, she needs to know if she needs an appt of if a prescription can be refilled,, please call patient back at [phone]

## 2022-12-07 ENCOUNTER — OFFICE VISIT (OUTPATIENT)
Dept: DIABETES | Facility: CLINIC | Age: 52
End: 2022-12-07
Payer: MEDICAID

## 2022-12-07 ENCOUNTER — OFFICE VISIT (OUTPATIENT)
Dept: ORTHOPEDICS | Facility: CLINIC | Age: 52
End: 2022-12-07
Payer: MEDICAID

## 2022-12-07 ENCOUNTER — TELEPHONE (OUTPATIENT)
Dept: PAIN MEDICINE | Facility: CLINIC | Age: 52
End: 2022-12-07
Payer: MEDICAID

## 2022-12-07 VITALS — WEIGHT: 177 LBS | HEIGHT: 56 IN | BODY MASS INDEX: 39.82 KG/M2

## 2022-12-07 DIAGNOSIS — Z79.4 TYPE 2 DIABETES MELLITUS WITH HYPERGLYCEMIA, WITH LONG-TERM CURRENT USE OF INSULIN: Primary | ICD-10-CM

## 2022-12-07 DIAGNOSIS — S46.811A STRAIN OF RIGHT TRAPEZIUS MUSCLE, INITIAL ENCOUNTER: ICD-10-CM

## 2022-12-07 DIAGNOSIS — M54.12 CERVICAL RADICULOPATHY: ICD-10-CM

## 2022-12-07 DIAGNOSIS — E11.65 TYPE 2 DIABETES MELLITUS WITH HYPERGLYCEMIA, WITH LONG-TERM CURRENT USE OF INSULIN: Primary | ICD-10-CM

## 2022-12-07 DIAGNOSIS — M67.819 TENDINOSIS OF ROTATOR CUFF: Primary | ICD-10-CM

## 2022-12-07 PROCEDURE — 99213 PR OFFICE/OUTPT VISIT, EST, LEVL III, 20-29 MIN: ICD-10-PCS | Mod: 95,,, | Performed by: NURSE PRACTITIONER

## 2022-12-07 PROCEDURE — 3061F PR NEG MICROALBUMINURIA RESULT DOCUMENTED/REVIEW: ICD-10-PCS | Mod: CPTII,95,, | Performed by: NURSE PRACTITIONER

## 2022-12-07 PROCEDURE — 1159F PR MEDICATION LIST DOCUMENTED IN MEDICAL RECORD: ICD-10-PCS | Mod: CPTII,,, | Performed by: STUDENT IN AN ORGANIZED HEALTH CARE EDUCATION/TRAINING PROGRAM

## 2022-12-07 PROCEDURE — 3072F LOW RISK FOR RETINOPATHY: CPT | Mod: CPTII,95,, | Performed by: NURSE PRACTITIONER

## 2022-12-07 PROCEDURE — 1160F RVW MEDS BY RX/DR IN RCRD: CPT | Mod: CPTII,,, | Performed by: STUDENT IN AN ORGANIZED HEALTH CARE EDUCATION/TRAINING PROGRAM

## 2022-12-07 PROCEDURE — 3044F PR MOST RECENT HEMOGLOBIN A1C LEVEL <7.0%: ICD-10-PCS | Mod: CPTII,95,, | Performed by: NURSE PRACTITIONER

## 2022-12-07 PROCEDURE — 3072F PR LOW RISK FOR RETINOPATHY: ICD-10-PCS | Mod: CPTII,,, | Performed by: STUDENT IN AN ORGANIZED HEALTH CARE EDUCATION/TRAINING PROGRAM

## 2022-12-07 PROCEDURE — 99213 OFFICE O/P EST LOW 20 MIN: CPT | Mod: 95,,, | Performed by: NURSE PRACTITIONER

## 2022-12-07 PROCEDURE — 99999 PR PBB SHADOW E&M-EST. PATIENT-LVL V: CPT | Mod: PBBFAC,,, | Performed by: STUDENT IN AN ORGANIZED HEALTH CARE EDUCATION/TRAINING PROGRAM

## 2022-12-07 PROCEDURE — 3044F HG A1C LEVEL LT 7.0%: CPT | Mod: CPTII,,, | Performed by: STUDENT IN AN ORGANIZED HEALTH CARE EDUCATION/TRAINING PROGRAM

## 2022-12-07 PROCEDURE — 3061F NEG MICROALBUMINURIA REV: CPT | Mod: CPTII,95,, | Performed by: NURSE PRACTITIONER

## 2022-12-07 PROCEDURE — 3044F HG A1C LEVEL LT 7.0%: CPT | Mod: CPTII,95,, | Performed by: NURSE PRACTITIONER

## 2022-12-07 PROCEDURE — 3072F PR LOW RISK FOR RETINOPATHY: ICD-10-PCS | Mod: CPTII,95,, | Performed by: NURSE PRACTITIONER

## 2022-12-07 PROCEDURE — 4010F ACE/ARB THERAPY RXD/TAKEN: CPT | Mod: CPTII,95,, | Performed by: NURSE PRACTITIONER

## 2022-12-07 PROCEDURE — 4010F PR ACE/ARB THEARPY RXD/TAKEN: ICD-10-PCS | Mod: CPTII,95,, | Performed by: NURSE PRACTITIONER

## 2022-12-07 PROCEDURE — 3061F NEG MICROALBUMINURIA REV: CPT | Mod: CPTII,,, | Performed by: STUDENT IN AN ORGANIZED HEALTH CARE EDUCATION/TRAINING PROGRAM

## 2022-12-07 PROCEDURE — 1160F PR REVIEW ALL MEDS BY PRESCRIBER/CLIN PHARMACIST DOCUMENTED: ICD-10-PCS | Mod: CPTII,,, | Performed by: STUDENT IN AN ORGANIZED HEALTH CARE EDUCATION/TRAINING PROGRAM

## 2022-12-07 PROCEDURE — 1159F MED LIST DOCD IN RCRD: CPT | Mod: CPTII,,, | Performed by: STUDENT IN AN ORGANIZED HEALTH CARE EDUCATION/TRAINING PROGRAM

## 2022-12-07 PROCEDURE — 4010F ACE/ARB THERAPY RXD/TAKEN: CPT | Mod: CPTII,,, | Performed by: STUDENT IN AN ORGANIZED HEALTH CARE EDUCATION/TRAINING PROGRAM

## 2022-12-07 PROCEDURE — 4010F PR ACE/ARB THEARPY RXD/TAKEN: ICD-10-PCS | Mod: CPTII,,, | Performed by: STUDENT IN AN ORGANIZED HEALTH CARE EDUCATION/TRAINING PROGRAM

## 2022-12-07 PROCEDURE — 99215 OFFICE O/P EST HI 40 MIN: CPT | Mod: PBBFAC | Performed by: STUDENT IN AN ORGANIZED HEALTH CARE EDUCATION/TRAINING PROGRAM

## 2022-12-07 PROCEDURE — 3061F PR NEG MICROALBUMINURIA RESULT DOCUMENTED/REVIEW: ICD-10-PCS | Mod: CPTII,,, | Performed by: STUDENT IN AN ORGANIZED HEALTH CARE EDUCATION/TRAINING PROGRAM

## 2022-12-07 PROCEDURE — 3008F BODY MASS INDEX DOCD: CPT | Mod: CPTII,,, | Performed by: STUDENT IN AN ORGANIZED HEALTH CARE EDUCATION/TRAINING PROGRAM

## 2022-12-07 PROCEDURE — 99999 PR PBB SHADOW E&M-EST. PATIENT-LVL V: ICD-10-PCS | Mod: PBBFAC,,, | Performed by: STUDENT IN AN ORGANIZED HEALTH CARE EDUCATION/TRAINING PROGRAM

## 2022-12-07 PROCEDURE — 3008F PR BODY MASS INDEX (BMI) DOCUMENTED: ICD-10-PCS | Mod: CPTII,,, | Performed by: STUDENT IN AN ORGANIZED HEALTH CARE EDUCATION/TRAINING PROGRAM

## 2022-12-07 PROCEDURE — 3066F NEPHROPATHY DOC TX: CPT | Mod: CPTII,,, | Performed by: STUDENT IN AN ORGANIZED HEALTH CARE EDUCATION/TRAINING PROGRAM

## 2022-12-07 PROCEDURE — 3066F PR DOCUMENTATION OF TREATMENT FOR NEPHROPATHY: ICD-10-PCS | Mod: CPTII,95,, | Performed by: NURSE PRACTITIONER

## 2022-12-07 PROCEDURE — 3066F NEPHROPATHY DOC TX: CPT | Mod: CPTII,95,, | Performed by: NURSE PRACTITIONER

## 2022-12-07 PROCEDURE — 3044F PR MOST RECENT HEMOGLOBIN A1C LEVEL <7.0%: ICD-10-PCS | Mod: CPTII,,, | Performed by: STUDENT IN AN ORGANIZED HEALTH CARE EDUCATION/TRAINING PROGRAM

## 2022-12-07 PROCEDURE — 99214 OFFICE O/P EST MOD 30 MIN: CPT | Mod: S$PBB,,, | Performed by: STUDENT IN AN ORGANIZED HEALTH CARE EDUCATION/TRAINING PROGRAM

## 2022-12-07 PROCEDURE — 99214 PR OFFICE/OUTPT VISIT, EST, LEVL IV, 30-39 MIN: ICD-10-PCS | Mod: S$PBB,,, | Performed by: STUDENT IN AN ORGANIZED HEALTH CARE EDUCATION/TRAINING PROGRAM

## 2022-12-07 PROCEDURE — 3066F PR DOCUMENTATION OF TREATMENT FOR NEPHROPATHY: ICD-10-PCS | Mod: CPTII,,, | Performed by: STUDENT IN AN ORGANIZED HEALTH CARE EDUCATION/TRAINING PROGRAM

## 2022-12-07 PROCEDURE — 3072F LOW RISK FOR RETINOPATHY: CPT | Mod: CPTII,,, | Performed by: STUDENT IN AN ORGANIZED HEALTH CARE EDUCATION/TRAINING PROGRAM

## 2022-12-07 RX ORDER — INSULIN GLARGINE 300 U/ML
92 INJECTION, SOLUTION SUBCUTANEOUS DAILY
Qty: 4 PEN | Refills: 2
Start: 2022-12-07 | End: 2023-01-04 | Stop reason: SDUPTHER

## 2022-12-07 RX ORDER — METFORMIN HYDROCHLORIDE 1000 MG/1
1000 TABLET ORAL 2 TIMES DAILY WITH MEALS
Qty: 180 TABLET | Refills: 0 | Status: SHIPPED | OUTPATIENT
Start: 2022-12-07 | End: 2023-02-24

## 2022-12-07 RX ORDER — TIZANIDINE 4 MG/1
4 TABLET ORAL EVERY 6 HOURS PRN
Qty: 120 TABLET | Refills: 0 | Status: SHIPPED | OUTPATIENT
Start: 2022-12-07 | End: 2023-01-17 | Stop reason: SDUPTHER

## 2022-12-07 RX ORDER — TIZANIDINE 4 MG/1
4 TABLET ORAL EVERY 6 HOURS PRN
COMMUNITY
End: 2022-12-07 | Stop reason: SDUPTHER

## 2022-12-07 NOTE — PROGRESS NOTES
Subjective:         Patient ID: Yolanda Betts is a 52 y.o. female.  Patient's current PCP is Sasha Sorenson MD.       Chief Complaint: No chief complaint on file.    HPI  Yolanda Betts is a 52 y.o. Black or  female presenting for a follow up for diabetes. Patient has been diagnosed with diabetes for > 10 years.     Complications related to diabetes:  HTN, Hyperlipidemia, peripheral neuropathy; denies Pancreatitis; denies Gastroparesis; denies DKA; denies Hx/family Hx of MEN2/MTC; reports Frequent UTIs/yeast infections; Bariatric surgery 6/1/21.     Past failed treatment include:  Jardiance- multiple yeast infections; Victoza - GI upset    Blood glucose testing is performed regularly, reports fasting BG , < 130 in the day, currently 80, no hypoglycemia, CGM - No    Compliance:The patient reports medication and diet compliance most of the time.       The patient location is: home, [place]  The chief complaint leading to consultation is: DM follow up    Visit type: audiovisual    Face to Face time with patient: 15 minutes of total time spent on the encounter, which includes face to face time and non-face to face time preparing to see the patient (eg, review of tests), Obtaining and/or reviewing separately obtained history, Documenting clinical information in the electronic or other health record, Independently interpreting results (not separately reported) and communicating results to the patient/family/caregiver, or Care coordination (not separately reported). Each patient to whom he or she provides medical services by telemedicine is:  (1) informed of the relationship between the physician and patient and the respective role of any other health care provider with respect to management of the patient; and (2) notified that he or she may decline to receive medical services by telemedicine and may withdraw from such care at any time.         //   , There is no height or weight on file to  calculate BMI.  Her blood sugar in clinic today is:   Lab Results   Component Value Date    POCGLU 269 (A) 11/11/2019       Labs reviewed and are noted below.  Her most recent A1C is:  Lab Results   Component Value Date    HGBA1C 6.4 (H) 09/21/2022    HGBA1C 6.2 (H) 03/08/2022    HGBA1C 6.4 (H) 02/07/2022     Lab Results   Component Value Date    CPEPTIDE 4.56 02/26/2018     Lab Results   Component Value Date    GLUTAMICACID 0.00 02/26/2018     Glucose   Date Value Ref Range Status   09/21/2022 140 (H) 70 - 110 mg/dL Final     Anion Gap   Date Value Ref Range Status   09/21/2022 8 8 - 16 mmol/L Final     eGFR if    Date Value Ref Range Status   03/08/2022 >60.0 >60 mL/min/1.73 m^2 Final     eGFR if non    Date Value Ref Range Status   03/08/2022 >60.0 >60 mL/min/1.73 m^2 Final     Comment:     Calculation used to obtain the estimated glomerular filtration  rate (eGFR) is the CKD-EPI equation.          CURRENT DM MEDICATIONS:     Diabetes Medications               insulin aspart U-100 (NOVOLOG) 100 unit/mL injection Inject up to 10 units 3 times a day before meals based on sliding scale Not needed    insulin glargine U-300 conc (TOUJEO MAX U-300 SOLOSTAR) 300 unit/mL (3 mL) insulin pen Inject 92 Units into the skin every evening.       metFORMIN (GLUCOPHAGE) 1000 MG tablet Take 1 tablet (1,000 mg total) by mouth 2 (two) times daily with meals.            Diabetes Management Status    Statin: Taking  ACE/ARB: Taking    Screening or Prevention Patient's value Goal Complete/Controlled?   HgA1C Testing and Control   Lab Results   Component Value Date    HGBA1C 6.4 (H) 09/21/2022      Annually/Less than 8% Yes   Lipid profile : 09/21/2022 Annually Yes   LDL control Lab Results   Component Value Date    LDLCALC 43.8 (L) 09/21/2022    Annually/Less than 100 mg/dl  Yes   Nephropathy screening Lab Results   Component Value Date    LABMICR 5.0 09/21/2022     Lab Results   Component Value Date     PROTEINUA Negative 07/20/2020    Annually Yes   Blood pressure BP Readings from Last 1 Encounters:   09/21/22 109/69    Less than 140/90 Yes   Dilated retinal exam : 10/20/2021 Annually Yes   Foot exam   : 03/10/2022 denies wounds Annually Yes     Review of Systems   Constitutional:  Negative for activity change and appetite change.   Gastrointestinal:  Negative for abdominal pain, constipation, nausea and vomiting.        Hx of IBS   Endocrine: Negative for polydipsia, polyphagia and polyuria.   Musculoskeletal:  Positive for arthralgias.   Neurological:  Negative for syncope and weakness.        Neuropathy   Psychiatric/Behavioral:  Negative for confusion.        Objective:      Physical Exam  Constitutional:       General: She is not in acute distress.     Appearance: She is not ill-appearing.   Neurological:      Mental Status: She is alert.   Psychiatric:         Speech: Speech normal.       Assessment:       1. Type 2 diabetes mellitus with hyperglycemia, with long-term current use of insulin          Plan:   Type 2 diabetes mellitus with hyperglycemia, with long-term current use of insulin         PLAN:   - Condition: uncontrolled    - Monitor blood glucose 4x daily.Goals reviewed  - She is working with the RD  - BP and LDL- Recommended lifestyle modifications for management. Encouraged healthy low fat, low carb diet and increase physical activity  - Medication Changes: Continue Toujeo, Continue Metformin, Take Novolog per ss,   - She would like to keep this regimen, we previously discussed restarting her GLP-1, it was deferred due to GI pain- later dx as kidney stones  - The patient was explained the above plan and given opportunity to ask questions.  She understands, chooses and consents to this plan and accepts all the risks, which include but are not limited to the risks mentioned above.   - Labs ordered as above  - Nurse visit:  deferred    - Follow up:  6 months        If blood pre-meal sugar is 151-200  take +2 units of Novolog;  If blood pre-meal sugar is 201-250 take +4 units of Novolog;  If blood pre-meal sugar is 251-300 take +6 units of Novolog;  If blood pre-meal sugar is 301-350 take +8 units of Novolog;  If blood pre-meal sugar is 351-400+ take +10 units of Novolog;      A total of 15 minutes was spent in face to face time, of which over 50% was spent in counseling patient on disease process, complications, treatment, and side effects of medications.

## 2022-12-07 NOTE — TELEPHONE ENCOUNTER
----- Message from Madison Aranda RN sent at 12/7/2022  9:55 AM CST -----  This patient was seen by Dr. Kirkpatrick. He has ordered a cervical MRI, as well as placed a new B&S referral. She has a f/u with Bernice in January, but she states she wants a sooner appt. I can not schedule her. Can someone please look into this and see if they can get her in or not and either let her know, or myself, and I will contact. Whichever you guys prefer.    Thanks,  RD

## 2022-12-07 NOTE — PROGRESS NOTES
Orthopaedic Follow-Up Visit    Last Appointment: 9/28/22  Diagnosis: Chronic right shoulder pain, suspected rotator cuff tear  Prior Procedure: MRI     Yolanda Betts is a 52 y.o. female who is here for f/u evaluation of her right shoulder. The patient was last seen here by me on 9/28/22 at which point we decided to send her for an updated MRI of the right shoulder to re-evaluate the status of her rotator cuff tear that was seen on prior MRI due to her continued worsening symptoms. The patient returns today to review her MRI results and reports that the symptoms are the same and is interested in discussing further treatment options.     To review her history, Yolanda Betts is a 52 y.o. right-hand dominant female who presented with right shoulder pain and dysfunction that started several  years ago with no specific injury, but reported a gradual onset of symptoms. She had a prior MRI that showed some partial articular sided tearing. At her last visit on 2/9/21 we proceeded with right shoulder glenohumeral corticosteroid injection and continuation of physical therapy. She returned with continued worsening pain in her shoulder so  a new MRI was ordered to evaluate the status of her rotator cuff tear.     Patient's medications, allergies, past medical, surgical, social and family histories were reviewed and updated as appropriate.    Review of Systems   All systems reviewed were negative.  Specifically, the patient denies fever, chills, weight loss, chest pain, shortness of breath, or dyspnea on exertion.      Past Medical History:   Diagnosis Date    Abnormal Pap smear of cervix     HPV genital warts    Anemia     Anxiety     Arthritis     Asthma 10/11/2016    Bipolar 1 disorder     Diabetes mellitus, type 2     Dyslipidemia associated with type 2 diabetes mellitus 5/13/2019    General anesthetics causing adverse effect in therapeutic use     Genital warts     GERD (gastroesophageal reflux disease)      Herpes simplex virus (HSV) infection     Hyperlipidemia     Hypertension     Hypertension complicating diabetes 2019    Migraine with aura and without status migrainosus, not intractable 3/21/2016    Mild persistent asthma without complication 10/11/2016    Morbid obesity with body mass index (BMI) of 45.0 to 49.9 in adult 8/3/2017    Obstructive sleep apnea     ALEN (obstructive sleep apnea)     2L per N/C q HS    Schizoaffective disorder, bipolar type 2019    Seasonal allergic rhinitis due to pollen 2019    Type 2 diabetes mellitus with hyperglycemia, with long-term current use of insulin 2017    Type 2 diabetes mellitus with hyperglycemia, without long-term current use of insulin 2017       Past Surgical History:   Procedure Laterality Date    abcess removed right back      BELT ABDOMINOPLASTY      BREAST SURGERY Bilateral 1996    Reduction     SECTION      X 2    COLONOSCOPY      COLONOSCOPY N/A 2018    Procedure: colonoscopy for iron deficiency anemia;  Surgeon: Frankie Sanchez MD;  Location: [facility] ENDO;  Service: Endoscopy;  Laterality: N/A;    COLONOSCOPY N/A 2018    Procedure: COLONOSCOPY;  Surgeon: Frankie Sanchez MD;  Location: [facility] ENDO;  Service: Endoscopy;  Laterality: N/A;    HYSTERECTOMY      w/ BSO; hypermenorrhea    MOUTH SURGERY      OOPHORECTOMY      hyst/bso, hypermenorrhea    ROBOT-ASSISTED CHOLECYSTECTOMY USING DA DARI XI N/A 1/3/2020    Procedure: XI ROBOTIC CHOLECYSTECTOMY;  Surgeon: Damir Driscoll MD;  Location: [facility] OR;  Service: General;  Laterality: N/A;    TOTAL REDUCTION MAMMOPLASTY      TUBAL LIGATION         Patient's Medications   New Prescriptions    No medications on file   Previous Medications    ALBUTEROL (PROVENTIL/VENTOLIN HFA) 90 MCG/ACTUATION INHALER    Inhale 2 puffs into the lungs every 6 hours as needed for wheezing    AMLODIPINE (NORVASC) 10 MG TABLET    Take 10 mg by mouth every morning.     "AMLODIPINE-VALSARTAN (EXFORGE)  MG PER TABLET    Take 1 tablet by mouth once daily.    ASPIRIN (ECOTRIN) 81 MG EC TABLET    Take 81 mg by mouth once daily.    ATORVASTATIN (LIPITOR) 20 MG TABLET    Take 1 tablet (20 mg total) by mouth every evening.    BD INSULIN SYRINGE ULTRA-FINE 1/2 ML 30 GAUGE X 1/2" SYRG        BLOOD SUGAR DIAGNOSTIC (ONETOUCH ULTRA TEST) STRP    Check blood glucose 4 times daily as directed and as needed (dispense insurance preferred brand or patient choice)    BLOOD SUGAR DIAGNOSTIC STRP    1 each by Misc.(Non-Drug; Combo Route) route 4 (four) times daily. [facility] Glucose Test Strips    BLOOD-GLUCOSE METER MISC    Use as directed    BUDESONIDE-FORMOTEROL 160-4.5 MCG (SYMBICORT) 160-4.5 MCG/ACTUATION HFAA    Inhale 2 puffs into the lungs every 12 (twelve) hours. Controller : Use spacer    CLONAZEPAM (KLONOPIN) 1 MG TABLET    Take 1 tablet (1 mg total) by mouth once daily.    CYCLOSPORINE 0.05 % DROP    Place 1 drop into both eyes 2 (two) times daily.    DICLOFENAC SODIUM (VOLTAREN) 1 % GEL    Apply 2 grams topically 4 (four) times daily as needed.    DULOXETINE (CYMBALTA) 60 MG CAPSULE    Take 1 capsule (60 mg total) by mouth once daily.    ERGOCALCIFEROL (ERGOCALCIFEROL) 50,000 UNIT CAP    Take 1 capsule twice weekly for 3 weeks then weekly    FENOFIBRATE (TRICOR) 145 MG TABLET    Take 1 tablet (145 mg total) by mouth once daily.    FLASH GLUCOSE SCANNING READER (FREESTYLE AMY 2 READER) [facility]    Use as directed to check blood sugar    FLUTICASONE PROPIONATE (FLONASE) 50 MCG/ACTUATION NASAL SPRAY    2 sprays (100 mcg total) by Each Nostril route once daily.    FROVATRIPTAN (FROVA) 2.5 MG TABLET    Take 1 tablet at onset of acute migraine. May take 1 more tablet 2 hours later if needed. (MAX 2 TABLET PER DAY. MAX 4 TABLETS PER WEEK.)    FUROSEMIDE (LASIX) 20 MG TABLET    Take 1 tablet (20 mg total) by mouth once daily.    HEALTHYLAX 17 GRAM PWPK    Take 17 g by mouth 2 (two) times daily. " "   INSULIN ASPART U-100 (NOVOLOG FLEXPEN U-100 INSULIN) 100 UNIT/ML (3 ML) INPN PEN    Inject 10 Units into the skin 3 (three) times daily before meals. Use based on sliding scale    INSULIN GLARGINE U-300 CONC (TOUJEO MAX U-300 SOLOSTAR) 300 UNIT/ML (3 ML) INSULIN PEN    Inject 92 Units into the skin once daily.    LANCETS (ONETOUCH DELICA PLUS LANCET) 30 GAUGE MISC    Use as directed 3 times daily    LEVOCETIRIZINE (XYZAL) 5 MG TABLET    Take 1 tablet (5 mg total) by mouth every evening.    LIDOCAINE-PRILOCAINE (EMLA) CREAM    Apply topically up to 4 times per day to affected area for pain relief    LURASIDONE (LATUDA) 60 MG TAB TABLET    Take 1 tablet by mouth daily with 350 calories    METFORMIN (GLUCOPHAGE) 1000 MG TABLET    Take 1 tablet (1,000 mg total) by mouth 2 (two) times daily with meals.    METOPROLOL SUCCINATE (TOPROL-XL) 50 MG 24 HR TABLET    Take 1 tablet (50 mg total) by mouth every evening.    NALTREXONE CAPSULE    Take 1 capsule (4.5 mg total) by mouth every evening.    OMEPRAZOLE (PRILOSEC) 20 MG CAPSULE    Take 1 capsule (20 mg total) by mouth 2 (two) times daily before meals.    PEN NEEDLE, DIABETIC (BD ULTRA-FINE MINI PEN NEEDLE) 31 GAUGE X 3/16" NDLE    Use 5 a day    PEN NEEDLE, DIABETIC (BD ULTRA-FINE MINI PEN NEEDLE) 31 GAUGE X 3/16" NDLE    Use one needle 5 times a day    PLECANATIDE (TRULANCE) 3 MG TAB    Take 3 mg by mouth once daily.    SPIRONOLACTONE (ALDACTONE) 25 MG TABLET    Take 1 tablet (25 mg total) by mouth once daily.    TIZANIDINE (ZANAFLEX) 4 MG TABLET    Take 4 mg by mouth every 6 (six) hours as needed.   Modified Medications    No medications on file   Discontinued Medications    No medications on file       Family History   Problem Relation Age of Onset    Diabetes Mother     Hyperlipidemia Mother     Hypertension Mother     Asthma Mother     COPD Mother     Glaucoma Mother     Thyroid disease Mother     Anesthesia problems Mother         "almost had a cardiac arrest" , " blood clots    Hypertension Father     Hyperlipidemia Father     Cancer Father         Brain, lung, liver, kidney    Heart disease Maternal Grandmother     Hyperlipidemia Maternal Grandmother     Hypertension Maternal Grandmother     Cataracts Maternal Grandmother     Diabetes Maternal Grandmother     Heart disease Maternal Grandfather     Hyperlipidemia Maternal Grandfather     Hypertension Maternal Grandfather     Glaucoma Maternal Grandfather     Cancer Maternal Grandfather     Cataracts Maternal Grandfather     Macular degeneration Maternal Grandfather     Diabetes Maternal Grandfather     Heart disease Paternal Grandmother     Hyperlipidemia Paternal Grandmother     Hypertension Paternal Grandmother     Cataracts Paternal Grandmother     Heart disease Paternal Grandfather     Hyperlipidemia Paternal Grandfather     Hypertension Paternal Grandfather     Cataracts Paternal Grandfather     Breast cancer Maternal Cousin     Breast cancer Maternal Cousin     Breast cancer Maternal Cousin     Breast cancer Maternal Cousin        Review of patient's allergies indicates:   Allergen Reactions    Codeine Itching    Hydromorphone Other (See Comments)     Can't wake up for long time if taken  Slow to wake up after surgery after receiving    Lyrica [pregabalin] Itching    Neuromuscular blockers, steroidal Hives     some    Latex, natural rubber Rash    Morphine Rash     itching    Norco [hydrocodone-acetaminophen] Itching, Rash and Hallucinations    Seconal [secobarbital sodium] Rash     itching    Tylox [oxycodone-acetaminophen] Rash         Objective:      Physical Exam  Patient is alert and oriented, no distress. Skin is intact. Neuro is normal with no focal motor or sensory findings.    Decreased cervical ROM.     Physical Exam:  RIGHT    LEFT    Scap Dyskinesis/Winging (-)    (-)    Tenderness:          Greater Tuberosity  (-)    (-)  Bicipital Groove  +    (-)  AC joint   (-)    (-)  Other:     +Trapezius    Forward  Elevation       140                                          180  Abduction                    90                                          120  ER (at side)                 50                                            60  IR                                 L5                                           T8    Strength:   Supraspinatus             4+/5                                           5/5  Infraspinatus               4+/5                                           5/5  Subscap / IR               5/5                                           5/5     Special Tests:              Neer:                                       (-)                                             (-)              Moseley:                                 +                                              (-)              SS Stress:                               +                                              (-)              Bear Hug:                                (-)                                             (-)              McCulloch's:                                 +                                              (-)              Resisted Thrower's:                +                                              (-)              Cross Arm Abduction:             (-)                                             (-)    Neurovascular examination  - Motor grossly intact bilaterally to shoulder abduction, elbow flexion and extension, wrist flexion and extension, and intrinsic hand musculature  - Sensation intact to light touch bilaterally in axillary, median, radial, and ulnar distributions  - Symmetrical radial pulses    Imaging:    XR Results:  Results for orders placed during the hospital encounter of 03/21/22    X-ray Shoulder 2 or More Views Right    Narrative  EXAMINATION:  XR SHOULDER COMPLETE 2 OR MORE VIEWS RIGHT    CLINICAL HISTORY:  Impingement syndrome of right shoulder    TECHNIQUE:  Two or three views of the right shoulder were  performed.    COMPARISON:  Right shoulder radiographs May 19, 2020    FINDINGS:  No right shoulder fracture.  Alignment is normal.  Acromioclavicular and glenohumeral joint spaces are within normal limits.  Subtle calcifications noted along the lateral aspect of the humeral head suggestive of mild calcific tendinopathy.  No osseous erosion or suspicious osseous lesion.  Visualized lung is clear.    Impression  As above.      Electronically signed by: Ronaldo Aburto  Date:    03/21/2022  Time:    07:45      MRI Results:  Results for orders placed during the hospital encounter of 10/20/22    MRI Shoulder Without Contrast Right    Narrative  EXAMINATION:  MRI SHOULDER WITHOUT CONTRAST RIGHT    CLINICAL HISTORY:  Shoulder pain, rotator cuff disorder suspected, xray done; Pain in right shoulder    TECHNIQUE:  Right shoulder MRI without IV contrast.    COMPARISON:  Right shoulder radiographs 03/21/2022, right shoulder MRI 07/18/2020    FINDINGS:  Moderate tendinosis diffusely affects the supraspinatus tendon, unchanged.  Focal area of T2 hyperintensity affecting posterior infraspinatus tendon near the greater tuberosity insertion suggests focal low-grade partial-thickness tear affecting 10-20% of articular sided fibers and measuring 4 mm AP dimension, new.  Teres minor and subscapularis tendons are normal.  Rotator cuff muscles show no significant fatty atrophy and maintain normal signal.    Long head of biceps tendon and biceps labral complex are intact.  Negative for labral tear.    Physiologic amount of fluid lies in glenohumeral joint.  No articular cartilage defect identified.    Minor AC joint degenerative changes are unchanged.  Type 2 acromion demonstrates mild lateral downsloping.  Small amount of fluid lies in the subacromial subdeltoid bursa.  The inferior glenohumeral ligaments are unremarkable.    Diffuse low T1 bone marrow signal alteration suggest red marrow recruitment, unchanged.    Impression  1.  Moderate right supraspinatus tendinosis, unchanged.  2. Focal low-grade partial-thickness articular surface tear of posterior right infraspinatus tendon, new.  3. Minor right AC joint degenerative changes, unchanged      Electronically signed by: Alexander Lawrence MD  Date:    10/20/2022  Time:    18:49      CT Results:  No results found for this or any previous visit.      Physician read: I agree with the above impression.    Assessment/Plan:   Yolanda Betts is a 52 y.o. female with right shoulder rotator cuff tendinosis, suspected cervical radiculopathy    Plan:    Reviewed MRI with the patient today. Her MRI shows right shoulder rotator cuff tendinosis. Discussed with her that she has a very small partial articular sided cuff lesion that is less than 50% of the tendon attachment. I do not recommend operative treatment for a lesion of that size and I expect her symptoms likely originate from her neck.  She has tried rest, activity modification, NSAIDs, acetaminophen, home exercise program, and corticosteroid injection with no relief of their symptoms.    I recommend proceeding with a Cervical neck MRI to evaluate for neuroforaminal narrowing..  She will follow up with [facility].          Ayush Kirkpatrick MD    I, Caitlyn Alvarado, acted as a scribe for Ayush Kirkpatrick MD for the duration of this office visit.

## 2022-12-07 NOTE — TELEPHONE ENCOUNTER
Called patient informed her that she is on the waiting list for sooner appointment, and will be notified of sooner appointment.  Patient verbalized understanding.

## 2022-12-15 ENCOUNTER — TELEPHONE (OUTPATIENT)
Dept: ORTHOPEDICS | Facility: CLINIC | Age: 52
End: 2022-12-15
Payer: MEDICAID

## 2022-12-15 ENCOUNTER — TELEPHONE (OUTPATIENT)
Dept: RHEUMATOLOGY | Facility: CLINIC | Age: 52
End: 2022-12-15
Payer: MEDICAID

## 2022-12-15 ENCOUNTER — PATIENT MESSAGE (OUTPATIENT)
Dept: ORTHOPEDICS | Facility: CLINIC | Age: 52
End: 2022-12-15
Payer: MEDICAID

## 2022-12-15 DIAGNOSIS — M67.819 TENDINOSIS OF ROTATOR CUFF: ICD-10-CM

## 2022-12-15 DIAGNOSIS — M54.12 CERVICAL RADICULOPATHY: ICD-10-CM

## 2022-12-15 DIAGNOSIS — F41.9 ANXIETY: Primary | ICD-10-CM

## 2022-12-15 DIAGNOSIS — S46.811A STRAIN OF RIGHT TRAPEZIUS MUSCLE, INITIAL ENCOUNTER: Primary | ICD-10-CM

## 2022-12-15 RX ORDER — ALPRAZOLAM 0.25 MG/1
0.25 TABLET ORAL SEE ADMIN INSTRUCTIONS
Qty: 2 TABLET | Refills: 0 | Status: SHIPPED | OUTPATIENT
Start: 2022-12-15 | End: 2023-02-24 | Stop reason: DRUGHIGH

## 2022-12-15 RX ORDER — NAPROXEN 500 MG/1
500 TABLET ORAL 2 TIMES DAILY
Qty: 60 TABLET | Refills: 1 | Status: SHIPPED | OUTPATIENT
Start: 2022-12-15 | End: 2023-08-10

## 2022-12-15 NOTE — TELEPHONE ENCOUNTER
----- Message from Sonia Dhaliwal PA-C sent at 12/15/2022  1:52 PM CST -----  Contact: [phone]  I will send her in some naproxen to hold her over until she sees pain management, will you call and let her know that we are sending her something to the pharmacy listed below.    Thanks,  Sonia Dhaliwal     ----- Message -----  From: Heidi Gonsalez MA  Sent: 12/15/2022  11:41 AM CST  To: Sonia Dhaliwal PA-C      ----- Message -----  From: Susan Matta  Sent: 12/15/2022   8:59 AM CST  To: Casimiro Peacock Staff    Pt is calling regarding extreme right shoulder pain , hurts to lift arm. Pt is requesting something be called into local pharmacy for pain . Please call back at [phone] . Thanks/amair       [id number] - [place] - [address] AT [facility] [address]  [address] [place] [address]  Phone: [phone] Fax: [phone]

## 2022-12-15 NOTE — TELEPHONE ENCOUNTER
----- Message from Susan Matta sent at 12/15/2022  8:52 AM CST -----  Contact: [phone]  Pt is calling regarding extreme right shoulder pain , hurts to lift arm , is requesting something to be called into local pharmacy for pain . Please call back at [phone] . Thanks/aamir         [facility] #[id number] - [place] - [address] AT [facility]  [address] [address]  Phone: [phone] Fax: [phone]

## 2022-12-15 NOTE — TELEPHONE ENCOUNTER
Called patient and let her know that Dr. Kirkpatrick's PA, Sonia Dhaliwal has sent over some Naproxen to her pharmacy for her left shoulder pain until she is able to see Pain Management. Also provided her with Radiology phone number to get her MRI scheduled. Patient verbalized understanding and was grateful for the call back.

## 2022-12-15 NOTE — TELEPHONE ENCOUNTER
----- Message from Heidi Gonsalez MA sent at 12/15/2022 11:41 AM CST -----  Contact: [phone]    ----- Message -----  From: Susan Matta  Sent: 12/15/2022   8:59 AM CST  To: Casimiro Peacock Staff    Pt is calling regarding extreme right shoulder pain , hurts to lift arm. Pt is requesting something be called into local pharmacy for pain . Please call back at [phone] . Thanks/aamir       [id number] - [place] - [address] AT [facility]  [address] [address]  Phone: [phone] Fax: [phone]

## 2022-12-15 NOTE — TELEPHONE ENCOUNTER
Rx for alprazolam provided as follows:  0.25 mg, take one tablet 60 minutes before MRI, if still feeling anxious take second tablet just before MRI; dispense #2   For MRI anxiety/claustrophobia     ----- Message from Heidi Gonsalez MA sent at 12/15/2022  2:29 PM CST -----  Saad Scott,    Ms. Guerrero is requesting some medication for her MRI scheduled 1/3/23. Let me know if you are able to send it over, and I can give her a call  ----- Message -----  From: Ely York  Sent: 12/15/2022   2:28 PM CST  To: Ayush Kirkpatrick MD, #    Good Afternoon!    Will you please reach out to this patient regarding medication to help her relax during her exam?    Thank You,    Ely SANTIAGO  Radiology Dept

## 2022-12-15 NOTE — TELEPHONE ENCOUNTER
Patient advised to contact orthopedics regarding her shoulder pain for recommendations. She was recently seen by Dr Kirkpatrick on 12/7/2022

## 2022-12-16 ENCOUNTER — TELEPHONE (OUTPATIENT)
Dept: ORTHOPEDICS | Facility: CLINIC | Age: 52
End: 2022-12-16
Payer: MEDICAID

## 2022-12-16 NOTE — TELEPHONE ENCOUNTER
----- Message from Sonia Dhaliwal PA-C sent at 12/15/2022  4:02 PM CST -----  You can call patient and let her know I sent a script to the pharmacy for her MRI. She is to take one tablet 60 minutes before MRI, if still feeling anxious take second tablet just before MRI.     ----- Message -----  From: Heidi Gonsalez MA  Sent: 12/15/2022   2:32 PM CST  To: MARJORIE Carter,    Ms. Guerrero is requesting some medication for her MRI scheduled 1/3/23. Let me know if you are able to send it over, and I can give her a call  ----- Message -----  From: Ely York  Sent: 12/15/2022   2:28 PM CST  To: Ayush Kirkpatrick MD, #    Good Afternoon!    Will you please reach out to this patient regarding medication to help her relax during her exam?    Thank You,    Ely SANTIAGO  Radiology Dept

## 2022-12-16 NOTE — TELEPHONE ENCOUNTER
Called patient and let her know that Sonia has sent over some medications for her to take prior to her MRI on 1/3. Patient also stated she has had no relief of pain from the Naproxen Rx. Informed patient that I would consult with Dr. Kirkpatrick and Sonia regarding this and get back to her. Patient verbalized understanding and was grateful for the call back.

## 2022-12-16 NOTE — TELEPHONE ENCOUNTER
Called patient and let her know that we cannot prescribe any other medications for pain at this time as she is to f/u with [facility] following her MRI. Informed patient that she can alternate Tylenol with her Naproxen for additional pain relief. Patient verbalized understanding and was grateful for the call back.

## 2022-12-19 ENCOUNTER — OFFICE VISIT (OUTPATIENT)
Dept: OPHTHALMOLOGY | Facility: CLINIC | Age: 52
End: 2022-12-19
Payer: MEDICAID

## 2022-12-19 ENCOUNTER — TELEPHONE (OUTPATIENT)
Dept: OPHTHALMOLOGY | Facility: CLINIC | Age: 52
End: 2022-12-19
Payer: MEDICAID

## 2022-12-19 DIAGNOSIS — E11.9 TYPE 2 DIABETES MELLITUS WITHOUT RETINOPATHY: Primary | ICD-10-CM

## 2022-12-19 DIAGNOSIS — H52.4 MYOPIA WITH PRESBYOPIA, BILATERAL: ICD-10-CM

## 2022-12-19 DIAGNOSIS — E11.36 DIABETIC CATARACT OF BOTH EYES: ICD-10-CM

## 2022-12-19 DIAGNOSIS — H04.129 DRY EYE: ICD-10-CM

## 2022-12-19 DIAGNOSIS — H52.13 MYOPIA WITH PRESBYOPIA, BILATERAL: ICD-10-CM

## 2022-12-19 DIAGNOSIS — H25.13 NUCLEAR SCLEROSIS, BILATERAL: ICD-10-CM

## 2022-12-19 PROCEDURE — 1159F PR MEDICATION LIST DOCUMENTED IN MEDICAL RECORD: ICD-10-PCS | Mod: CPTII,,, | Performed by: OPTOMETRIST

## 2022-12-19 PROCEDURE — 4010F PR ACE/ARB THEARPY RXD/TAKEN: ICD-10-PCS | Mod: CPTII,,, | Performed by: OPTOMETRIST

## 2022-12-19 PROCEDURE — 92015 PR REFRACTION: ICD-10-PCS | Mod: ,,, | Performed by: OPTOMETRIST

## 2022-12-19 PROCEDURE — 3061F NEG MICROALBUMINURIA REV: CPT | Mod: CPTII,,, | Performed by: OPTOMETRIST

## 2022-12-19 PROCEDURE — 3061F PR NEG MICROALBUMINURIA RESULT DOCUMENTED/REVIEW: ICD-10-PCS | Mod: CPTII,,, | Performed by: OPTOMETRIST

## 2022-12-19 PROCEDURE — 99999 PR PBB SHADOW E&M-EST. PATIENT-LVL III: CPT | Mod: PBBFAC,,, | Performed by: OPTOMETRIST

## 2022-12-19 PROCEDURE — 99213 OFFICE O/P EST LOW 20 MIN: CPT | Mod: PBBFAC | Performed by: OPTOMETRIST

## 2022-12-19 PROCEDURE — 92015 DETERMINE REFRACTIVE STATE: CPT | Mod: ,,, | Performed by: OPTOMETRIST

## 2022-12-19 PROCEDURE — 92014 PR EYE EXAM, EST PATIENT,COMPREHESV: ICD-10-PCS | Mod: S$PBB,,, | Performed by: OPTOMETRIST

## 2022-12-19 PROCEDURE — 3066F PR DOCUMENTATION OF TREATMENT FOR NEPHROPATHY: ICD-10-PCS | Mod: CPTII,,, | Performed by: OPTOMETRIST

## 2022-12-19 PROCEDURE — 1159F MED LIST DOCD IN RCRD: CPT | Mod: CPTII,,, | Performed by: OPTOMETRIST

## 2022-12-19 PROCEDURE — 3044F HG A1C LEVEL LT 7.0%: CPT | Mod: CPTII,,, | Performed by: OPTOMETRIST

## 2022-12-19 PROCEDURE — 92014 COMPRE OPH EXAM EST PT 1/>: CPT | Mod: S$PBB,,, | Performed by: OPTOMETRIST

## 2022-12-19 PROCEDURE — 1160F RVW MEDS BY RX/DR IN RCRD: CPT | Mod: CPTII,,, | Performed by: OPTOMETRIST

## 2022-12-19 PROCEDURE — 1160F PR REVIEW ALL MEDS BY PRESCRIBER/CLIN PHARMACIST DOCUMENTED: ICD-10-PCS | Mod: CPTII,,, | Performed by: OPTOMETRIST

## 2022-12-19 PROCEDURE — 2023F DILAT RTA XM W/O RTNOPTHY: CPT | Mod: CPTII,,, | Performed by: OPTOMETRIST

## 2022-12-19 PROCEDURE — 3044F PR MOST RECENT HEMOGLOBIN A1C LEVEL <7.0%: ICD-10-PCS | Mod: CPTII,,, | Performed by: OPTOMETRIST

## 2022-12-19 PROCEDURE — 3066F NEPHROPATHY DOC TX: CPT | Mod: CPTII,,, | Performed by: OPTOMETRIST

## 2022-12-19 PROCEDURE — 2023F PR DILATED RETINAL EXAM W/O EVID OF RETINOPATHY: ICD-10-PCS | Mod: CPTII,,, | Performed by: OPTOMETRIST

## 2022-12-19 PROCEDURE — 4010F ACE/ARB THERAPY RXD/TAKEN: CPT | Mod: CPTII,,, | Performed by: OPTOMETRIST

## 2022-12-19 PROCEDURE — 99999 PR PBB SHADOW E&M-EST. PATIENT-LVL III: ICD-10-PCS | Mod: PBBFAC,,, | Performed by: OPTOMETRIST

## 2022-12-19 NOTE — PROGRESS NOTES
HPI     Diabetic Eye Exam            Comments: Diagnosed with diabetes in 1998  Lab Results       Component                Value               Date                       HGBA1C                   6.4 (H)             9/21/2022            Vision changes since last eye exam?: yes with near and distance   Any eye pain today: no  Other ocular symptoms: no  Interested in contact lens fitting today? no                             Last edited by Lorena Tejada MA on 12/19/2022  7:56 AM.            Assessment /Plan     For exam results, see Encounter Report.    Type 2 diabetes mellitus without retinopathy  There was no diabetic retinopathy present in either eye today.   Recommended that pt continue care with PCP and/or specialists regarding diabetes.  Follow-up dilated eye exam recommended in 12 months, sooner with any vision changes or new concerns.    Nuclear sclerosis, bilateral  Diabetic cataract of both eyes  Cataracts not significantly affecting activities of daily living and therefore surgery is not indicated at this time.   Will continue to monitor over the next 12 months. Pt to call or RTC with any significant change in vision prior to next visit.     Dry eye  Continue Restasis bid OU   Start Ivizia bid OU   Ivizia coupon given to pt      Myopia with presbyopia, bilateral  Eyeglass Final Rx       Eyeglass Final Rx         Sphere Add    Right -2.75 +2.25    Left -3.00 +2.25      Expiration Date: 12/19/2023                  gOCT stable OU compared to baseline in 2017    RTC 1 yr for dilated eye exam or PRN if any problems.   Discussed above and answered questions.

## 2022-12-22 DIAGNOSIS — E26.9 HYPERALDOSTERONISM: ICD-10-CM

## 2022-12-22 DIAGNOSIS — I10 ESSENTIAL HYPERTENSION: ICD-10-CM

## 2022-12-22 DIAGNOSIS — E11.69 DYSLIPIDEMIA ASSOCIATED WITH TYPE 2 DIABETES MELLITUS: ICD-10-CM

## 2022-12-22 DIAGNOSIS — E78.1 HYPERTRIGLYCERIDEMIA: ICD-10-CM

## 2022-12-22 DIAGNOSIS — E78.5 DYSLIPIDEMIA ASSOCIATED WITH TYPE 2 DIABETES MELLITUS: ICD-10-CM

## 2022-12-22 DIAGNOSIS — E87.5 HYPERKALEMIA: ICD-10-CM

## 2022-12-22 DIAGNOSIS — G43.109 MIGRAINE WITH AURA AND WITHOUT STATUS MIGRAINOSUS, NOT INTRACTABLE: Chronic | ICD-10-CM

## 2022-12-22 NOTE — TELEPHONE ENCOUNTER
Spoke with patient to inform medication refill request will be sent to [facility] to be advised. She voiced understanding.

## 2022-12-23 RX ORDER — FENOFIBRATE 145 MG/1
145 TABLET, FILM COATED ORAL DAILY
Qty: 90 TABLET | Refills: 1 | Status: SHIPPED | OUTPATIENT
Start: 2022-12-23 | End: 2023-01-05 | Stop reason: SDUPTHER

## 2022-12-23 RX ORDER — ATORVASTATIN CALCIUM 20 MG/1
20 TABLET, FILM COATED ORAL NIGHTLY
Qty: 90 TABLET | Refills: 3 | Status: SHIPPED | OUTPATIENT
Start: 2022-12-23 | End: 2023-01-05 | Stop reason: SDUPTHER

## 2022-12-23 RX ORDER — AMLODIPINE AND VALSARTAN 10; 320 MG/1; MG/1
1 TABLET ORAL DAILY
Qty: 90 TABLET | Refills: 1 | Status: SHIPPED | OUTPATIENT
Start: 2022-12-23 | End: 2023-01-05 | Stop reason: SDUPTHER

## 2022-12-23 RX ORDER — METOPROLOL SUCCINATE 50 MG/1
50 TABLET, EXTENDED RELEASE ORAL NIGHTLY
Qty: 90 TABLET | Refills: 0 | Status: SHIPPED | OUTPATIENT
Start: 2022-12-23 | End: 2023-01-05 | Stop reason: SDUPTHER

## 2022-12-23 RX ORDER — FROVATRIPTAN SUCCINATE 2.5 MG/1
TABLET, FILM COATED ORAL
Qty: 9 TABLET | Refills: 1 | Status: SHIPPED | OUTPATIENT
Start: 2022-12-23 | End: 2023-07-10 | Stop reason: SDUPTHER

## 2022-12-23 RX ORDER — LEVOCETIRIZINE DIHYDROCHLORIDE 5 MG/1
5 TABLET, FILM COATED ORAL NIGHTLY
Qty: 30 TABLET | Refills: 11 | Status: SHIPPED | OUTPATIENT
Start: 2022-12-23 | End: 2024-01-04 | Stop reason: SDUPTHER

## 2022-12-23 RX ORDER — FUROSEMIDE 20 MG/1
20 TABLET ORAL DAILY
Qty: 30 TABLET | Refills: 11 | Status: SHIPPED | OUTPATIENT
Start: 2022-12-23 | End: 2023-01-05 | Stop reason: SDUPTHER

## 2022-12-23 RX ORDER — SPIRONOLACTONE 25 MG/1
25 TABLET ORAL DAILY
Qty: 30 TABLET | Refills: 6 | Status: SHIPPED | OUTPATIENT
Start: 2022-12-23 | End: 2023-01-05 | Stop reason: SDUPTHER

## 2022-12-29 ENCOUNTER — TELEPHONE (OUTPATIENT)
Dept: PSYCHIATRY | Facility: CLINIC | Age: 52
End: 2022-12-29
Payer: MEDICAID

## 2022-12-29 NOTE — TELEPHONE ENCOUNTER
Spoke with patient who stated she is not doing good. Been sad, depressed and not eating.  The cause is stress and bills.   This has been going on for 2 months.     Patient was told she missed two appts from two providers and will need an appt. If symptoms worsen to go to the ER.    Patient scheduled for a visit and verbalized understanding.

## 2022-12-29 NOTE — TELEPHONE ENCOUNTER
----- Message from Milagros Bonilla sent at 12/29/2022  8:38 AM CST -----  Contact: pt  Pt is calling in regard to her mental health, very depressed, can't eat and needs to talk to someone.  Please call her back at [phone]  isamar/yojana

## 2022-12-30 ENCOUNTER — OFFICE VISIT (OUTPATIENT)
Dept: PRIMARY CARE CLINIC | Facility: CLINIC | Age: 52
End: 2022-12-30
Payer: MEDICAID

## 2022-12-30 ENCOUNTER — PATIENT MESSAGE (OUTPATIENT)
Dept: PSYCHIATRY | Facility: CLINIC | Age: 52
End: 2022-12-30
Payer: MEDICAID

## 2022-12-30 DIAGNOSIS — R45.89 HOPELESSNESS: ICD-10-CM

## 2022-12-30 DIAGNOSIS — F32.A DEPRESSION, UNSPECIFIED DEPRESSION TYPE: Primary | ICD-10-CM

## 2022-12-30 PROCEDURE — 3044F PR MOST RECENT HEMOGLOBIN A1C LEVEL <7.0%: ICD-10-PCS | Mod: CPTII,95,, | Performed by: NURSE PRACTITIONER

## 2022-12-30 PROCEDURE — 3061F PR NEG MICROALBUMINURIA RESULT DOCUMENTED/REVIEW: ICD-10-PCS | Mod: CPTII,95,, | Performed by: NURSE PRACTITIONER

## 2022-12-30 PROCEDURE — 1159F MED LIST DOCD IN RCRD: CPT | Mod: CPTII,95,, | Performed by: NURSE PRACTITIONER

## 2022-12-30 PROCEDURE — 99213 OFFICE O/P EST LOW 20 MIN: CPT | Mod: 95,,, | Performed by: NURSE PRACTITIONER

## 2022-12-30 PROCEDURE — 3044F HG A1C LEVEL LT 7.0%: CPT | Mod: CPTII,95,, | Performed by: NURSE PRACTITIONER

## 2022-12-30 PROCEDURE — 3061F NEG MICROALBUMINURIA REV: CPT | Mod: CPTII,95,, | Performed by: NURSE PRACTITIONER

## 2022-12-30 PROCEDURE — 3072F PR LOW RISK FOR RETINOPATHY: ICD-10-PCS | Mod: CPTII,95,, | Performed by: NURSE PRACTITIONER

## 2022-12-30 PROCEDURE — 99213 PR OFFICE/OUTPT VISIT, EST, LEVL III, 20-29 MIN: ICD-10-PCS | Mod: 95,,, | Performed by: NURSE PRACTITIONER

## 2022-12-30 PROCEDURE — 3066F NEPHROPATHY DOC TX: CPT | Mod: CPTII,95,, | Performed by: NURSE PRACTITIONER

## 2022-12-30 PROCEDURE — 4010F PR ACE/ARB THEARPY RXD/TAKEN: ICD-10-PCS | Mod: CPTII,95,, | Performed by: NURSE PRACTITIONER

## 2022-12-30 PROCEDURE — 1159F PR MEDICATION LIST DOCUMENTED IN MEDICAL RECORD: ICD-10-PCS | Mod: CPTII,95,, | Performed by: NURSE PRACTITIONER

## 2022-12-30 PROCEDURE — 4010F ACE/ARB THERAPY RXD/TAKEN: CPT | Mod: CPTII,95,, | Performed by: NURSE PRACTITIONER

## 2022-12-30 PROCEDURE — 3066F PR DOCUMENTATION OF TREATMENT FOR NEPHROPATHY: ICD-10-PCS | Mod: CPTII,95,, | Performed by: NURSE PRACTITIONER

## 2022-12-30 PROCEDURE — 1160F RVW MEDS BY RX/DR IN RCRD: CPT | Mod: CPTII,95,, | Performed by: NURSE PRACTITIONER

## 2022-12-30 PROCEDURE — 3072F LOW RISK FOR RETINOPATHY: CPT | Mod: CPTII,95,, | Performed by: NURSE PRACTITIONER

## 2022-12-30 PROCEDURE — 1160F PR REVIEW ALL MEDS BY PRESCRIBER/CLIN PHARMACIST DOCUMENTED: ICD-10-PCS | Mod: CPTII,95,, | Performed by: NURSE PRACTITIONER

## 2022-12-30 NOTE — PROGRESS NOTES
The patient location is: [place], [place]  The chief complaint leading to consultation is: depression    Visit type: audiovisual    Face to Face time with patient: 25 minuted  30 minutes of total time spent on the encounter, which includes face to face time and non-face to face time preparing to see the patient (eg, review of tests), Obtaining and/or reviewing separately obtained history, Documenting clinical information in the electronic or other health record, Independently interpreting results (not separately reported) and communicating results to the patient/family/caregiver, or Care coordination (not separately reported).         Each patient to whom he or she provides medical services by telemedicine is:  (1) informed of the relationship between the physician and patient and the respective role of any other health care provider with respect to management of the patient; and (2) notified that he or she may decline to receive medical services by telemedicine and may withdraw from such care at any time.    Notes:     Subjective:      Patient ID: Yolanda Betts is a 52 y.o. female.    Chief Complaint: No chief complaint on file.    There were no vitals taken for this visit.    51 y/o female presents virtually for feelings of depression and feeling hopeless. States she has been feeling this way for the last 2-3 months and it has gradually gotten worse. Feels the holidays and difficulty paying bills have contributed to her mood. Pt is being managed with medication by psych and also goes to therapy. Has not seen her counselor in awhile but does take her medication daily. Attempted to follow up with psych but has not heard back from them yet. Denies thoughts of harming herself or others.      Review of patient's allergies indicates:   Allergen Reactions    Codeine Itching    Hydromorphone Other (See Comments)     Can't wake up for long time if taken  Slow to wake up after surgery after receiving    Lyrica  [pregabalin] Itching    Neuromuscular blockers, steroidal Hives     some    Latex, natural rubber Rash    Morphine Rash     itching    Norco [hydrocodone-acetaminophen] Itching, Rash and Hallucinations    Seconal [secobarbital sodium] Rash     itching    Tylox [oxycodone-acetaminophen] Rash        Review of Systems   Constitutional:  Negative for chills and fever.   HENT:  Negative for congestion and sore throat.    Respiratory:  Negative for cough and shortness of breath.    Cardiovascular:  Negative for chest pain and palpitations.   Gastrointestinal:  Negative for nausea and vomiting.   Genitourinary: Negative.    Skin: Negative.    Neurological:  Negative for headaches.   Psychiatric/Behavioral:  Positive for depression.     Objective:      Physical Exam  Neurological:      Mental Status: She is alert.   Psychiatric:         Mood and Affect: Mood is depressed.         Thought Content: Thought content normal. Thought content does not include homicidal or suicidal ideation. Thought content does not include homicidal or suicidal plan.       LABS REVIEWED  No visits with results within 3 Month(s) from this visit.   Latest known visit with results is:   Lab Visit on 09/21/2022   Component Date Value Ref Range Status    Cholesterol 09/21/2022 131  120 - 199 mg/dL Final    Triglycerides 09/21/2022 246 (H)  30 - 150 mg/dL Final    HDL 09/21/2022 38 (L)  40 - 75 mg/dL Final    LDL Cholesterol 09/21/2022 43.8 (L)  63.0 - 159.0 mg/dL Final    HDL/Cholesterol Ratio 09/21/2022 29.0  20.0 - 50.0 % Final    Total Cholesterol/HDL Ratio 09/21/2022 3.4  2.0 - 5.0 Final    Non-HDL Cholesterol 09/21/2022 93  mg/dL Final    Hemoglobin A1C 09/21/2022 6.4 (H)  4.0 - 5.6 % Final    Estimated Avg Glucose 09/21/2022 137 (H)  68 - 131 mg/dL Final    Microalbumin, Urine 09/21/2022 5.0  ug/mL Final    Creatinine, Urine 09/21/2022 135.0  15.0 - 325.0 mg/dL Final    Microalb/Creat Ratio 09/21/2022 3.7  0.0 - 30.0 ug/mg Final    HLA B27  Interpretation 09/21/2022    Final                    Value:SAPE: 211  TAQ: [id number]      B27 Testing Date 09/21/2022 10/07/2022 07:47 AM   Final    HLA B27 Result 09/21/2022 Negative   Final    WBC 09/21/2022 8.66  3.90 - 12.70 K/uL Final    RBC 09/21/2022 4.19  4.00 - 5.40 M/uL Final    Hemoglobin 09/21/2022 11.3 (L)  12.0 - 16.0 g/dL Final    Hematocrit 09/21/2022 36.0 (L)  37.0 - 48.5 % Final    MCV 09/21/2022 86  82 - 98 fL Final    MCH 09/21/2022 27.0  27.0 - 31.0 pg Final    MCHC 09/21/2022 31.4 (L)  32.0 - 36.0 g/dL Final    RDW 09/21/2022 14.6 (H)  11.5 - 14.5 % Final    Platelets 09/21/2022 566 (H)  150 - 450 K/uL Final    MPV 09/21/2022 9.5  9.2 - 12.9 fL Final    Immature Granulocytes 09/21/2022 0.2  0.0 - 0.5 % Final    Gran # (ANC) 09/21/2022 4.3  1.8 - 7.7 K/uL Final    Immature Grans (Abs) 09/21/2022 0.02  0.00 - 0.04 K/uL Final    Lymph # 09/21/2022 3.5  1.0 - 4.8 K/uL Final    Mono # 09/21/2022 0.6  0.3 - 1.0 K/uL Final    Eos # 09/21/2022 0.2  0.0 - 0.5 K/uL Final    Baso # 09/21/2022 0.07  0.00 - 0.20 K/uL Final    nRBC 09/21/2022 0  0 /100 WBC Final    Gran % 09/21/2022 50.0  38.0 - 73.0 % Final    Lymph % 09/21/2022 40.2  18.0 - 48.0 % Final    Mono % 09/21/2022 6.5  4.0 - 15.0 % Final    Eosinophil % 09/21/2022 2.3  0.0 - 8.0 % Final    Basophil % 09/21/2022 0.8  0.0 - 1.9 % Final    Differential Method 09/21/2022 Automated   Final    Sodium 09/21/2022 140  136 - 145 mmol/L Final    Potassium 09/21/2022 4.8  3.5 - 5.1 mmol/L Final    Chloride 09/21/2022 105  95 - 110 mmol/L Final    CO2 09/21/2022 27  23 - 29 mmol/L Final    Glucose 09/21/2022 140 (H)  70 - 110 mg/dL Final    BUN 09/21/2022 19  6 - 20 mg/dL Final    Creatinine 09/21/2022 0.9  0.5 - 1.4 mg/dL Final    Calcium 09/21/2022 9.8  8.7 - 10.5 mg/dL Final    Total Protein 09/21/2022 7.1  6.0 - 8.4 g/dL Final    Albumin 09/21/2022 4.0  3.5 - 5.2 g/dL Final    Total Bilirubin 09/21/2022 0.2  0.1 - 1.0 mg/dL Final    Alkaline Phosphatase  09/21/2022 59  55 - 135 U/L Final    AST 09/21/2022 11  10 - 40 U/L Final    ALT 09/21/2022 14  10 - 44 U/L Final    Anion Gap 09/21/2022 8  8 - 16 mmol/L Final    eGFR 09/21/2022 >60  >60 mL/min/1.73 m^2 Final    Sed Rate 09/21/2022 18  0 - 36 mm/Hr Final    CRP 09/21/2022 12.9 (H)  0.0 - 8.2 mg/L Final      Assessment:       1. Depression, unspecified depression type    2. Hopelessness          Plan:     Depression, unspecified depression type    Hopelessness       Virtual visit conducted via [employer].    Pt is requesting a psych referral but informed her that she is already connected with psych and has a scheduled appt.    Urged pt to report to the ER if she does not hear back from psych by the end of the day or if symptoms worsened. States she thinks the psychiatrist will give her the same advice and verbalized understanding.

## 2022-12-30 NOTE — LETTER
December 30, 2022      [facility] [place] [facility]  [address] [address]       Patient: Yolanda Betts   YOB: 1970  Date of Visit: 12/30/2022    To Whom It May Concern:    Bennett Betts  was at [facility] on 12/30/2022. The patient may return to work/school on 12/31/2022 with no restrictions. If you have any questions or concerns, or if I can be of further assistance, please do not hesitate to contact me.    Sincerely,        Feroz Maciel, NP

## 2022-12-30 NOTE — PATIENT INSTRUCTIONS
Follow up with your psychiatrist for further management of your symptoms.  Follow up with your therapist for a counseling session.  Report to the ER for worsening of your symptoms such as feelings of self harm or harming others.

## 2023-01-01 NOTE — PROCEDURES
Large Joint Aspiration/Injection: L subacromial bursa  Date/Time: 1/10/2020 10:00 AM  Performed by: Abilio Gibbs MD  Authorized by: Abilio Gibbs MD     Consent Done?:  Yes (Verbal)  Indications:  Pain  Procedure site marked: Yes    Timeout: Prior to procedure the correct patient, procedure, and site was verified      Location:  Shoulder  Site:  L subacromial bursa  Prep: Patient was prepped and draped in usual sterile fashion    Needle size:  25 G  Ultrasonic Guidance for needle placement: No  Medications:  40 mg triamcinolone acetonide 40 mg/mL  Patient tolerance:  Patient tolerated the procedure well with no immediate complications      
[] : Resident

## 2023-01-03 ENCOUNTER — HOSPITAL ENCOUNTER (OUTPATIENT)
Dept: RADIOLOGY | Facility: HOSPITAL | Age: 53
Discharge: HOME | End: 2023-01-03
Attending: STUDENT IN AN ORGANIZED HEALTH CARE EDUCATION/TRAINING PROGRAM
Payer: MEDICAID

## 2023-01-03 DIAGNOSIS — S46.811A STRAIN OF RIGHT TRAPEZIUS MUSCLE, INITIAL ENCOUNTER: ICD-10-CM

## 2023-01-03 PROCEDURE — 72141 MRI CERVICAL SPINE WITHOUT CONTRAST: ICD-10-PCS | Mod: 26,,, | Performed by: RADIOLOGY

## 2023-01-03 PROCEDURE — 72141 MRI NECK SPINE W/O DYE: CPT | Mod: TC

## 2023-01-03 PROCEDURE — 72141 MRI NECK SPINE W/O DYE: CPT | Mod: 26,,, | Performed by: RADIOLOGY

## 2023-01-04 ENCOUNTER — DOCUMENTATION ONLY (OUTPATIENT)
Dept: PSYCHIATRY | Facility: CLINIC | Age: 53
End: 2023-01-04
Payer: MEDICAID

## 2023-01-04 ENCOUNTER — OFFICE VISIT (OUTPATIENT)
Dept: PSYCHIATRY | Facility: CLINIC | Age: 53
End: 2023-01-04
Payer: MEDICAID

## 2023-01-04 DIAGNOSIS — M79.7 FIBROMYALGIA: ICD-10-CM

## 2023-01-04 DIAGNOSIS — M79.18 MYOFASCIAL MUSCLE PAIN: ICD-10-CM

## 2023-01-04 DIAGNOSIS — Z79.4 TYPE 2 DIABETES MELLITUS WITH HYPERGLYCEMIA, WITH LONG-TERM CURRENT USE OF INSULIN: ICD-10-CM

## 2023-01-04 DIAGNOSIS — F41.1 GENERALIZED ANXIETY DISORDER: Chronic | ICD-10-CM

## 2023-01-04 DIAGNOSIS — E11.65 TYPE 2 DIABETES MELLITUS WITH HYPERGLYCEMIA, WITH LONG-TERM CURRENT USE OF INSULIN: ICD-10-CM

## 2023-01-04 DIAGNOSIS — F31.30 BIPOLAR I DISORDER, MOST RECENT EPISODE (OR CURRENT) DEPRESSED: Primary | ICD-10-CM

## 2023-01-04 PROCEDURE — 90833 PR PSYCHOTHERAPY W/PATIENT W/E&M, 30 MIN (ADD ON): ICD-10-PCS | Mod: HP,HB,95, | Performed by: PSYCHOLOGIST

## 2023-01-04 PROCEDURE — 90833 PSYTX W PT W E/M 30 MIN: CPT | Mod: HP,HB,95, | Performed by: PSYCHOLOGIST

## 2023-01-04 PROCEDURE — 99215 OFFICE O/P EST HI 40 MIN: CPT | Mod: HP,HB,95, | Performed by: PSYCHOLOGIST

## 2023-01-04 PROCEDURE — 1159F MED LIST DOCD IN RCRD: CPT | Mod: HP,HB,CPTII,95 | Performed by: PSYCHOLOGIST

## 2023-01-04 PROCEDURE — 99215 PR OFFICE/OUTPT VISIT, EST, LEVL V, 40-54 MIN: ICD-10-PCS | Mod: HP,HB,95, | Performed by: PSYCHOLOGIST

## 2023-01-04 PROCEDURE — 3072F PR LOW RISK FOR RETINOPATHY: ICD-10-PCS | Mod: HP,HB,CPTII,95 | Performed by: PSYCHOLOGIST

## 2023-01-04 PROCEDURE — 3072F LOW RISK FOR RETINOPATHY: CPT | Mod: HP,HB,CPTII,95 | Performed by: PSYCHOLOGIST

## 2023-01-04 PROCEDURE — 1159F PR MEDICATION LIST DOCUMENTED IN MEDICAL RECORD: ICD-10-PCS | Mod: HP,HB,CPTII,95 | Performed by: PSYCHOLOGIST

## 2023-01-04 RX ORDER — LAMOTRIGINE 100 MG/1
100 TABLET ORAL DAILY
Qty: 30 TABLET | Refills: 1 | Status: SHIPPED | OUTPATIENT
Start: 2023-02-01 | End: 2023-02-24 | Stop reason: DRUGHIGH

## 2023-01-04 RX ORDER — INSULIN GLARGINE 300 U/ML
92 INJECTION, SOLUTION SUBCUTANEOUS DAILY
Qty: 4 PEN | Refills: 5 | Status: SHIPPED | OUTPATIENT
Start: 2023-01-04 | End: 2023-05-16 | Stop reason: SDUPTHER

## 2023-01-04 RX ORDER — DULOXETIN HYDROCHLORIDE 60 MG/1
60 CAPSULE, DELAYED RELEASE ORAL DAILY
Qty: 30 CAPSULE | Refills: 2 | Status: SHIPPED | OUTPATIENT
Start: 2023-01-04 | End: 2023-01-17 | Stop reason: SDUPTHER

## 2023-01-04 RX ORDER — LURASIDONE HYDROCHLORIDE 60 MG/1
TABLET, FILM COATED ORAL
Qty: 30 TABLET | Refills: 2 | Status: SHIPPED | OUTPATIENT
Start: 2023-01-04 | End: 2023-01-06 | Stop reason: SDUPTHER

## 2023-01-04 RX ORDER — INSULIN ASPART 100 [IU]/ML
10 INJECTION, SOLUTION INTRAVENOUS; SUBCUTANEOUS
Qty: 15 ML | Refills: 3 | Status: SHIPPED | OUTPATIENT
Start: 2023-01-04 | End: 2023-05-01 | Stop reason: ALTCHOICE

## 2023-01-04 RX ORDER — LAMOTRIGINE 25 MG/1
TABLET ORAL
Qty: 42 TABLET | Refills: 0 | Status: SHIPPED | OUTPATIENT
Start: 2023-01-04 | End: 2023-02-24 | Stop reason: DRUGHIGH

## 2023-01-04 NOTE — PATIENT INSTRUCTIONS
"[facility] OF [facility]   PATIENT INFORMATION    We appreciate the opportunity to participate in your medical care and hope the following protocols will make it easier for you to receive quality treatment in our department.    PUNCTUALITY: Your appointment is scheduled for a fixed amount of time, reserved especially for you.  To get the benefit of your appointment, please arrive at least 15 minutes early to allow time for traffic, parking and registration.  Should you arrive more than 15 minutes late to your appointment, you will be rescheduled in order to assure your clinician has adequate time to assess you and provide helpful care.      APPOINTMENTS: Appointments are made by the nursing/front office staff or through the patient portal. Providers do not have access  to schedule appointments. Walk in appointments are not available. FOR EMERGENCIES, PLEASE GO THE CLOSEST EMERGENCY ROOM.    CANCELLATION/MISSED APPOINTMENTS:   In order to receive quality care, all appointments must be kept.  If you are unable to keep an appointment, please reschedule at least 3 days prior if possible. Late cancellations (within 24 hours of the appointment) and repeated no-show appointments may result in dismissal from the clinic. After two no show/late cancellation visits, you will receive a notice letter, alerting you to keep visits to prevent department dismissal. If another visit is missed after receipt of the notice, you will be discharged from the clinic. This policy is in effect to allow for other individuals on a long waiting list to be seen as soon as possible. Unlike other branches of medicine where several individuals can be scheduled in a 30 minute time slot, only one individual can be scheduled in any time slot in Psychiatry.     MESSAGES: For simple questions/concerns, you may contact your individual providers electronically through the "[facility]" portal or by calling [phone] " with messages relayed via office staff. If relevant, include pharmacy name and phone number, date of last visit and next scheduled visit, phone number where you can be reached throughout the day, and whether leaving a voicemail or message on an answering machine is acceptable. Messages will be returned by the Medical Assistant or Office Staff after your provider has reviewed the message.  Please allow 24 hours for a returned message before leaving another message. Messages will be checked each workday (Monday through Friday) during office hours (8:00 a.m. and 5:00 p.m.) and returned at most within one business day.  You may leave a non-urgent message after hours. Note that psychotherapy and medication management are not appropriate by telephone or the patient portal.    PRESCRIPTION REFILLS:  Please communicate with your prescriber about any refills you need during your appointment. You may also request refills through the [facility] portal (preferred) or by calling the clinic. Prescriptions will be filled during office hours.     Please do not wait until you are completely out of medication to request refills. Same day refills are not always possible. Patients may experience symptoms of withdrawal if they run out of medications. The patient assumes all responsibility when there is an issue with non-compliance with follow-up appointments and medications.  Some medications are controlled and regulated by the FDA and [place]. Some of these medications can not be refilled before 30 days and require a face to face appointment.     PAPERWORK REQUESTS: If you have any forms or letters that need to be completed by your doctor, please present these at the beginning of the appointment to ensure that information needed to complete them is obtained during the office visit. Paperwork will be returned within 7-10 business days. Staff will call you to  the paperwork when completed.    SPECIAL EVALUATIONS: Please note that our  "department is treatment-focused. As such, we focus on treatment-oriented evaluations and do not perform specialty or "forensic" evaluations. Examples are listed below.    Disability: We do not do disability evaluations.  Please contact Social Security Administration for evaluations and determinations. You will then sign releases allowing for records from your treatment providers to be forwarded to Social Security Administration to use in their evaluation.  Gun Permit: We do not offer Sound Judgment Evaluations or assessments leading to gun ownership, nor do we fill out or file paperwork relevant to owning, concealing or purchasing a firearm.  Emotional Support     Animals (LUCIO): We do not provide documentation, including letters, to aid in the acclamation that an Emotional Support Animal is required. Note that ESAs are not trained to perform tasks or recognize particular signs or symptoms. Rather, they are distinguished by the close, emotional, and supportive bond between the animal and the owner.       SAMPLES: We do not provide samples of any medications. If you have financial difficulties and are on a limited income, you may qualify for Patient Assistance Programs from various pharmaceutical companies. This will require that you complete paperwork with your financial information, but this does not guarantee that the company will approve the application. Alternative medication options can be discussed.    REFERRALS/COORDINATION: You will be referred to other providers if we feel unable to adequately diagnose or treat your particular condition, or if collaboration with another provider would allow for better management of your condition.       Call In if problems  Call Report Side Effects   Encouraged to follow up with primary care / Gen Med MD for continued monitoring of general health and wellness  Call 911 Or go to ER if Acute Concerns (especially if any thoughts of harm to self or other)    The risk of " developing a rare but very serious rash while taking Lamictal was discussed. Patient was advised to take Lamictal exactly as prescribed, not to increase Lamictal unless directed and not to stop and start this medication. It was explained that patient needs to stay current with refills and can not miss taking Lamictal more than 4 days or it will have to be restarted at a reduced dose under prescriber supervision. Patient was advised not to start any new products while starting Lamictal. Should a mild rash develop, contact the nurse immediately for instructions. If a severe rash develops, discontinue Lamictal immediately and contact the nurse. Go to the ER for treatment if needed.            Mariam Young, PhD, MP  Advanced Practice Medical Psychologist  [facility]  [id number] [place] [address].  [place] [address]  [phone]   [phone] fax

## 2023-01-04 NOTE — PROGRESS NOTES
Outpatient Psychiatry Follow-Up Visit    1/4/2023    Timeframe: Corona Virus Outbreak     The patient location is: Patient's home/ Patient reported that his/her location at the time of this visit was in the [facility]     Visit type: Virtual visit with synchronous audio and video--We had to use audio via speakerphone and continue the video through the payeverhart due to technical difficulties and audio quality. We ended up completing with audio only.    Each patient to whom he or she provides medical services by telehealth is: (1) informed of the relationship between the medical psychologist and patient and the respective role of any other health care provider with respect to management of the patient; and (2) notified that he or she may decline to receive medical services by telehealth and may withdraw from such care at any time.    I also informed patient of the following:   Mariam Young, PhD, MPAP:  [place] medical license number: [id number]    My contact info:  [facility] / 2nd Floor  [address] [place] [address]   Ph: [phone]    If technology issues, call office phone: Ph: [phone]  If crisis: Dial 911 or go to nearest Emergency Room (ER)  If questions related to privacy practices: contact [facility] Information Department: [phone]    Chief Complaint:  Yolanda Betts is a 52 y.o. female who presents today for follow-up of mood and anxiety.       Impressions/Plan from last visit from May 2022: Yolanda attended her virtual visit. She has been taking her medicines and believes that they are working. She moved in to her new house this past weekend; is hoping to get back to the gym. She has been tired from the move and stress of the closing. She thinks that Cymbalta and Latuda make her sleepy and takes them at night. She reported that when she bends down, she feels dizzy--will follow up with her provider (more noticeable when unpacking and  "moving). For now, she wants to continue her medicines as prescribed. Continue Cymbalta 30 mg; Latuda to 60 mg; Klonopin 1 mg daily.     since January 2022.    Interval History and Content of Current Session: Yolanda attended her virtual visit. Her last visit was in May. She has started a new position closer to her home--no longer in [place]; had bought a house and is "having issues with it." She reported that she has mice; has a ceiling that "caved in;" she had to put in a hot water heater. Her homeowners ins only paid a portion. She is behind in her mortgage and car note--"I can't seem to get back on track." She has been more depressed. She is at [address] location with her job--a coworker leaves to go to the [employer] and stays gone for 3 hours; takes 2 hour lunches, etc. 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 She feels overwhelmed and burned out. She does not have a social life--she had been seeing someone, but "he became unreliable." That ended in October. She does not have a self-care routine right now. As we talked about this, she started crying. She reported having panic attacks--difficulty breathing, chest pain, feels like someone sitting on her chest--happening daily now "every day all day." They started about two months ago but have increased.  We discussed her multiple psychosocial factors contributing to her increase in depression at this time.  This includes a new job position in location, buying a new house, problems with the new house, a break-up in her relationship, increased physical pain, financial problems.  She had previously been seeing Ms. Stout for therapy, but Ms. Stout is no longer working within the [facility] system.  We discussed a referral for her to see a new therapist, as well as case management to consider community-based options for additional care.    Plan--continue Cymbalta 60 mg; Latuda to 60 mg; Klonopin 1 " mg prn; add Lamictal 25 mg daily for 2 weeks, then 50 mg daily for 2 weeks, then 100 mg daily     since 2022.        CASE MANAGEMENT REFERRAL    MRN: [id number]  : 1970  Reason for referral: consider IOP options; community resources    PSYCHOTHERAPY      Site: [facility]  Time: 16 minutes  Participants: Met with patient    Therapeutic Intervention Type: behavior modifying psychotherapy, supportive psychotherapy  Why chosen therapy is appropriate versus another modality: patient responds to this modality, evidence based practice    Target symptoms: anxiety , mood disorder  Primary focus: see above    Outcome monitoring methods: self-report, observation    Patient's response to intervention:  The patient's response to intervention is accepting.    Progress toward goals:  The patient's progress toward goals is fair .    Prior medicines: Prozac, Wellbutrin, Paxil, Lamictal, Lexapro, Celexa, Cymbalta, Remeron, Abilify, Seroquel, Risperdal, Restoril, Ambien, trazodone, Xanax, clonazepam    GAD7 2023   1. Feeling nervous, anxious, or on edge? 3 1 1   2. Not being able to stop or control worrying? 3 1 1   3. Worrying too much about different things? 3 1 1   4. Trouble relaxing? 3 1 1   5. Being so restless that it is hard to sit still? 3 1 1   6. Becoming easily annoyed or irritable? 3 1 1   7. Feeling afraid as if something awful might happen? 3 1 1   JOANN-7 Score 21 7 7      0-4 = Minimal anxiety  5-9 = Mild anxiety  10-14 = Moderate anxiety  15-21 = Severe anxiety       Review of Systems   PSYCHIATRIC: Pertinant items are noted in the narrative.    Past Medical, Family and Social History: The patient's past medical, family and social history have been reviewed and updated as appropriate within the electronic medical record - see encounter notes.      Current Outpatient Medications:     albuterol (PROVENTIL/VENTOLIN HFA) 90 mcg/actuation inhaler, Inhale 2 puffs into the  "lungs every 6 hours as needed for wheezing, Disp: 8.5 g, Rfl: 3    ALPRAZolam (XANAX) 0.25 MG tablet, Take 1 tablet (0.25 mg total) by mouth As instructed for Anxiety (take one tablet 60 minutes before MRI, if still feeling anxious take second tablet just before MRI)., Disp: 2 tablet, Rfl: 0    amLODIPine (NORVASC) 10 MG tablet, Take 10 mg by mouth every morning., Disp: , Rfl:     amlodipine-valsartan (EXFORGE)  mg per tablet, Take 1 tablet by mouth once daily., Disp: 90 tablet, Rfl: 1    aspirin (ECOTRIN) 81 MG EC tablet, Take 81 mg by mouth once daily., Disp: , Rfl:     atorvastatin (LIPITOR) 20 MG tablet, Take 1 tablet (20 mg total) by mouth every evening., Disp: 90 tablet, Rfl: 3    BD INSULIN SYRINGE ULTRA-FINE 1/2 mL 30 gauge x 1/2" Syrg, , Disp: , Rfl: 0    blood sugar diagnostic (ONETOUCH ULTRA TEST) Strp, Check blood glucose 4 times daily as directed and as needed (dispense insurance preferred brand or patient choice), Disp: 200 each, Rfl: 5    blood sugar diagnostic Strp, 1 each by Misc.(Non-Drug; Combo Route) route 4 (four) times daily. [facility] Glucose Test Strips, Disp: 400 strip, Rfl: 3    blood-glucose meter Misc, Use as directed (Patient taking differently: Use as directed), Disp: 1 each, Rfl: 0    budesonide-formoterol 160-4.5 mcg (SYMBICORT) 160-4.5 mcg/actuation HFAA, Inhale 2 puffs into the lungs every 12 (twelve) hours. Controller : Use spacer, Disp: 10.2 g, Rfl: 6    clonazePAM (KLONOPIN) 1 MG tablet, Take 1 tablet (1 mg total) by mouth once daily., Disp: 30 tablet, Rfl: 2    cycloSPORINE 0.05 % Drop, Place 1 drop into both eyes 2 (two) times daily., Disp: 5.5 mL, Rfl: 11    diclofenac sodium (VOLTAREN) 1 % Gel, Apply 2 grams topically 4 (four) times daily as needed., Disp: 5 each, Rfl: 0    DULoxetine (CYMBALTA) 60 MG capsule, Take 1 capsule (60 mg total) by mouth once daily., Disp: 30 capsule, Rfl: 2    ergocalciferol (ERGOCALCIFEROL) 50,000 unit Cap, Take 1 capsule twice weekly for 3 " weeks then weekly, Disp: 12 capsule, Rfl: 3    fenofibrate (TRICOR) 145 MG tablet, Take 1 tablet (145 mg total) by mouth once daily., Disp: 90 tablet, Rfl: 1    flash glucose scanning reader (FREESTYLE AMY 2 READER) [facility], Use as directed to check blood sugar, Disp: 1 each, Rfl: 1    fluticasone propionate (FLONASE) 50 mcg/actuation nasal spray, 2 sprays (100 mcg total) by Each Nostril route once daily., Disp: 16 g, Rfl: 0    frovatriptan (FROVA) 2.5 MG tablet, Take 1 tablet at onset of acute migraine. May take 1 more tablet 2 hours later if needed. (MAX 2 TABLET PER DAY. MAX 4 TABLETS PER WEEK.), Disp: 9 tablet, Rfl: 1    furosemide (LASIX) 20 MG tablet, Take 1 tablet (20 mg total) by mouth once daily., Disp: 30 tablet, Rfl: 11    HEALTHYLAX 17 gram PwPk, Take 17 g by mouth 2 (two) times daily., Disp: , Rfl:     insulin aspart U-100 (NOVOLOG FLEXPEN U-100 INSULIN) 100 unit/mL (3 mL) InPn pen, Inject 10 Units into the skin 3 (three) times daily before meals. Use based on sliding scale, Disp: 15 mL, Rfl: 3    insulin glargine U-300 conc (TOUJEO MAX U-300 SOLOSTAR) 300 unit/mL (3 mL) insulin pen, Inject 92 Units into the skin once daily., Disp: 4 pen, Rfl: 2    [START ON 2/1/2023] lamoTRIgine (LAMICTAL) 100 MG tablet, Take 1 tablet (100 mg total) by mouth once daily., Disp: 30 tablet, Rfl: 1    lamoTRIgine (LAMICTAL) 25 MG tablet, Take 1 tablet (25 mg total) by mouth every morning for 14 days, THEN 2 tablets (50 mg total) every morning for 14 days., Disp: 42 tablet, Rfl: 0    lancets (ONETOUCH DELICA PLUS LANCET) 30 gauge Misc, Use as directed 3 times daily, Disp: 100 each, Rfl: 11    levocetirizine (XYZAL) 5 MG tablet, Take 1 tablet (5 mg total) by mouth every evening., Disp: 30 tablet, Rfl: 11    LIDOcaine-prilocaine (EMLA) cream, Apply topically up to 4 times per day to affected area for pain relief, Disp: 60 g, Rfl: 0    lurasidone (LATUDA) 60 mg Tab tablet, Take 1 tablet by mouth daily with 350 calories, Disp:  "30 tablet, Rfl: 2    metFORMIN (GLUCOPHAGE) 1000 MG tablet, Take 1 tablet (1,000 mg total) by mouth 2 (two) times daily with meals., Disp: 180 tablet, Rfl: 0    metoprolol succinate (TOPROL-XL) 50 MG 24 hr tablet, Take 1 tablet (50 mg total) by mouth every evening., Disp: 90 tablet, Rfl: 0    naltrexone capsule, Take 1 capsule (4.5 mg total) by mouth every evening., Disp: 90 capsule, Rfl: 2    naproxen (NAPROSYN) 500 MG tablet, Take 1 tablet (500 mg total) by mouth 2 (two) times daily., Disp: 60 tablet, Rfl: 1    omeprazole (PRILOSEC) 20 MG capsule, Take 1 capsule (20 mg total) by mouth 2 (two) times daily before meals., Disp: 180 capsule, Rfl: 0    pen needle, diabetic (BD ULTRA-FINE MINI PEN NEEDLE) 31 gauge x 3/16" Ndle, Use 5 a day, Disp: 200 each, Rfl: 3    pen needle, diabetic (BD ULTRA-FINE MINI PEN NEEDLE) 31 gauge x 3/16" Ndle, Use one needle 5 times a day, Disp: 200 each, Rfl: 11    plecanatide (TRULANCE) 3 mg Tab, Take 3 mg by mouth once daily., Disp: 90 tablet, Rfl: 0    spironolactone (ALDACTONE) 25 MG tablet, Take 1 tablet (25 mg total) by mouth once daily., Disp: 30 tablet, Rfl: 6    tiZANidine (ZANAFLEX) 4 MG tablet, Take 1 tablet (4 mg total) by mouth every 6 (six) hours as needed., Disp: 120 tablet, Rfl: 0    Compliance: yes    Side effects: see above    Risk Parameters:  Patient reports no suicidal ideation  Patient reports no homicidal ideation  Patient reports no self-injurious behavior  Patient reports no violent behavior    Exam (detailed: at least 9 elements; comprehensive: all 15 elements)   Constitutional  Vitals:  Most recent vital signs were reviewed.   Last 3 sets of Vitals    Vitals - 1 value per visit 9/29/2022 12/7/2022 12/7/2022   SYSTOLIC - - -   DIASTOLIC - - -   Pulse - - -   Temp - - -   Resp - - -   SPO2 - - -   Weight (lb) 177.03 - 177   Weight (kg) 80.3 - 80.287   Height 56 - 56   BMI (Calculated) 39.7 - 39.7   VISIT REPORT - - -   Pain Score  - 8 -   Some recent data might be " hidden          General:  age appropriate, casually dressed, neatly groomed, hair in night cap     Musculoskeletal  Muscle Strength/Tone:  no tremor, no tic   Gait & Station:  video visit     Psychiatric  Speech:  no latency; no press   Behavior: wnl   Mood & Affect:  anxious, depressed  congruent and appropriate, tearful/crying   Thought Process:  normal and logical   Associations:  intact   Thought Content:  normal, no suicidality, no homicidality, delusions, or paranoia   Insight:  has awareness of illness   Judgement: behavior is adequate to circumstances   Orientation:  grossly intact   Memory: intact for content of interview   Language: grossly intact   Attention Span & Concentration:  Grossly intact   Fund of Knowledge:  intact and appropriate to age and level of education     Assessment and Diagnosis   Status/Progress: Based on the examination today, the patient's problem(s) is/are worsening.  New problems have been presented today.   Co-morbidities are complicating management of the primary condition.  There are no active rule-out diagnoses for this patient at this time.     General Impression:     Encounter Diagnoses   Name Primary?    Bipolar I disorder, most recent episode (or current) depressed Yes    Generalized anxiety disorder     Fibromyalgia     Myofascial muscle pain            Intervention/Counseling/Treatment Plan   Medication Management: Discussed risks, benefits, and alternatives to treatment plan documented above with patient. I answered all patient questions related to this plan, and patient expressed understanding and agreement.   continue Cymbalta 60 mg; Latuda to 60 mg; Klonopin 1 mg prn; add Lamictal 25 mg daily for 2 weeks, then 50 mg daily for 2 weeks, then 100 mg daily  Referred to therapy in clinic     referral  The risk of developing a rare but very serious rash while taking Lamictal was discussed. Patient was advised to take Lamictal exactly as prescribed, not to  "increase Lamictal unless directed and not to stop and start this medication. It was explained that patient needs to stay current with refills and can not miss taking Lamictal more than 4 days or it will have to be restarted at a reduced dose under prescriber supervision. Patient was advised not to start any new products while starting Lamictal. Should a mild rash develop, contact the nurse immediately for instructions. If a severe rash develops, discontinue Lamictal immediately and contact the nurse. Go to the ER for treatment if needed.      Medication List with Changes/Refills   New Medications    LAMOTRIGINE (LAMICTAL) 100 MG TABLET    Take 1 tablet (100 mg total) by mouth once daily.    LAMOTRIGINE (LAMICTAL) 25 MG TABLET    Take 1 tablet (25 mg total) by mouth every morning for 14 days, THEN 2 tablets (50 mg total) every morning for 14 days.   Current Medications    ALBUTEROL (PROVENTIL/VENTOLIN HFA) 90 MCG/ACTUATION INHALER    Inhale 2 puffs into the lungs every 6 hours as needed for wheezing    ALPRAZOLAM (XANAX) 0.25 MG TABLET    Take 1 tablet (0.25 mg total) by mouth As instructed for Anxiety (take one tablet 60 minutes before MRI, if still feeling anxious take second tablet just before MRI).    AMLODIPINE (NORVASC) 10 MG TABLET    Take 10 mg by mouth every morning.    AMLODIPINE-VALSARTAN (EXFORGE)  MG PER TABLET    Take 1 tablet by mouth once daily.    ASPIRIN (ECOTRIN) 81 MG EC TABLET    Take 81 mg by mouth once daily.    ATORVASTATIN (LIPITOR) 20 MG TABLET    Take 1 tablet (20 mg total) by mouth every evening.    BD INSULIN SYRINGE ULTRA-FINE 1/2 ML 30 GAUGE X 1/2" SYRG        BLOOD SUGAR DIAGNOSTIC (ONETOUCH ULTRA TEST) STRP    Check blood glucose 4 times daily as directed and as needed (dispense insurance preferred brand or patient choice)    BLOOD SUGAR DIAGNOSTIC STRP    1 each by Misc.(Non-Drug; Combo Route) route 4 (four) times daily. eal Glucose Test Strips    BLOOD-GLUCOSE METER MISC   "  Use as directed    BUDESONIDE-FORMOTEROL 160-4.5 MCG (SYMBICORT) 160-4.5 MCG/ACTUATION HFAA    Inhale 2 puffs into the lungs every 12 (twelve) hours. Controller : Use spacer    CLONAZEPAM (KLONOPIN) 1 MG TABLET    Take 1 tablet (1 mg total) by mouth once daily.    CYCLOSPORINE 0.05 % DROP    Place 1 drop into both eyes 2 (two) times daily.    DICLOFENAC SODIUM (VOLTAREN) 1 % GEL    Apply 2 grams topically 4 (four) times daily as needed.    ERGOCALCIFEROL (ERGOCALCIFEROL) 50,000 UNIT CAP    Take 1 capsule twice weekly for 3 weeks then weekly    FENOFIBRATE (TRICOR) 145 MG TABLET    Take 1 tablet (145 mg total) by mouth once daily.    FLASH GLUCOSE SCANNING READER ([employer] AMY 2 READER) [facility]    Use as directed to check blood sugar    FLUTICASONE PROPIONATE (FLONASE) 50 MCG/ACTUATION NASAL SPRAY    2 sprays (100 mcg total) by Each Nostril route once daily.    FROVATRIPTAN (FROVA) 2.5 MG TABLET    Take 1 tablet at onset of acute migraine. May take 1 more tablet 2 hours later if needed. (MAX 2 TABLET PER DAY. MAX 4 TABLETS PER WEEK.)    FUROSEMIDE (LASIX) 20 MG TABLET    Take 1 tablet (20 mg total) by mouth once daily.    HEALTHYLAX 17 GRAM PWPK    Take 17 g by mouth 2 (two) times daily.    INSULIN ASPART U-100 (NOVOLOG FLEXPEN U-100 INSULIN) 100 UNIT/ML (3 ML) INPN PEN    Inject 10 Units into the skin 3 (three) times daily before meals. Use based on sliding scale    INSULIN GLARGINE U-300 CONC (TOUJEO MAX U-300 SOLOSTAR) 300 UNIT/ML (3 ML) INSULIN PEN    Inject 92 Units into the skin once daily.    LANCETS (ONETOUCH DELICA PLUS LANCET) 30 GAUGE MISC    Use as directed 3 times daily    LEVOCETIRIZINE (XYZAL) 5 MG TABLET    Take 1 tablet (5 mg total) by mouth every evening.    LIDOCAINE-PRILOCAINE (EMLA) CREAM    Apply topically up to 4 times per day to affected area for pain relief    METFORMIN (GLUCOPHAGE) 1000 MG TABLET    Take 1 tablet (1,000 mg total) by mouth 2 (two) times daily with meals.    METOPROLOL  "SUCCINATE (TOPROL-XL) 50 MG 24 HR TABLET    Take 1 tablet (50 mg total) by mouth every evening.    NALTREXONE CAPSULE    Take 1 capsule (4.5 mg total) by mouth every evening.    NAPROXEN (NAPROSYN) 500 MG TABLET    Take 1 tablet (500 mg total) by mouth 2 (two) times daily.    OMEPRAZOLE (PRILOSEC) 20 MG CAPSULE    Take 1 capsule (20 mg total) by mouth 2 (two) times daily before meals.    PEN NEEDLE, DIABETIC (BD ULTRA-FINE MINI PEN NEEDLE) 31 GAUGE X 3/16" NDLE    Use 5 a day    PEN NEEDLE, DIABETIC (BD ULTRA-FINE MINI PEN NEEDLE) 31 GAUGE X 3/16" NDLE    Use one needle 5 times a day    PLECANATIDE (TRULANCE) 3 MG TAB    Take 3 mg by mouth once daily.    SPIRONOLACTONE (ALDACTONE) 25 MG TABLET    Take 1 tablet (25 mg total) by mouth once daily.    TIZANIDINE (ZANAFLEX) 4 MG TABLET    Take 1 tablet (4 mg total) by mouth every 6 (six) hours as needed.   Changed and/or Refilled Medications    Modified Medication Previous Medication    DULOXETINE (CYMBALTA) 60 MG CAPSULE DULoxetine (CYMBALTA) 60 MG capsule       Take 1 capsule (60 mg total) by mouth once daily.    Take 1 capsule (60 mg total) by mouth once daily.    LURASIDONE (LATUDA) 60 MG TAB TABLET lurasidone (LATUDA) 60 mg Tab tablet       Take 1 tablet by mouth daily with 350 calories    Take 1 tablet by mouth daily with 350 calories        Return to Clinic: 3 months      I spent an additional 26 minutes performing E/M services with >50% spent on counseling, guidance, coordinating care (not Psychotherapy related) in addition to the 26 minutes performing Psychotherapy.    Time spent with pt including note preparation: 42 minutes       Mariam Young, PhD, MP  Advanced Practice Medical Psychologist  [facility]  [id number] [place] [address].  [place] [id number]  [phone]   [phone] fax        "

## 2023-01-04 NOTE — PROGRESS NOTES
CASE MANAGEMENT REFERRAL    MRN: [id number]  : 1970  Reason for referral: consider IOP options; community resources

## 2023-01-04 NOTE — LETTER
January 4, 2023    Yolanda Betts  [address] [address]             [facility] 2ndFl  Psychiatry  [address] [address] [address]  Phone: [phone]  Fax: [phone]   January 4, 2023     Patient: Yolanda Betts   YOB: 1970   Date of Visit: 1/4/2023       To Whom it May Concern:    Yolanda Betts was seen in my clinic on 1/4/2023. She may return to work on 1/4/23 .    Please excuse her from any classes or work missed.    If you have any questions or concerns, please don't hesitate to call.    Sincerely,     Mariam Young, PhD, MPAP  Advanced Practice Medical Psychologist

## 2023-01-04 NOTE — TELEPHONE ENCOUNTER
Patient called in regards to her Toujeo Max. The pharmacy only gave her 3 pens instead of 4 pens. She also does need refills on her Novolog. Will forward medication request to provider.

## 2023-01-04 NOTE — TELEPHONE ENCOUNTER
----- Message from Jana Mejia sent at 1/4/2023 11:53 AM CST -----  States she would like the nurse to give her a call regarding her medicine dosage. Please call pt [phone]. Thank you

## 2023-01-05 ENCOUNTER — OFFICE VISIT (OUTPATIENT)
Dept: CARDIOLOGY | Facility: CLINIC | Age: 53
End: 2023-01-05
Payer: MEDICAID

## 2023-01-05 VITALS
HEART RATE: 80 BPM | BODY MASS INDEX: 40.72 KG/M2 | OXYGEN SATURATION: 98 % | SYSTOLIC BLOOD PRESSURE: 104 MMHG | WEIGHT: 181 LBS | DIASTOLIC BLOOD PRESSURE: 68 MMHG | HEIGHT: 56 IN

## 2023-01-05 DIAGNOSIS — Z79.4 TYPE 2 DIABETES MELLITUS WITH HYPERGLYCEMIA, WITH LONG-TERM CURRENT USE OF INSULIN: Chronic | ICD-10-CM

## 2023-01-05 DIAGNOSIS — E26.9 HYPERALDOSTERONISM: ICD-10-CM

## 2023-01-05 DIAGNOSIS — E66.9 NOCTURNAL HYPOXEMIA DUE TO OBESITY: Chronic | ICD-10-CM

## 2023-01-05 DIAGNOSIS — E11.69 DYSLIPIDEMIA ASSOCIATED WITH TYPE 2 DIABETES MELLITUS: ICD-10-CM

## 2023-01-05 DIAGNOSIS — J45.40 MODERATE PERSISTENT ASTHMA WITHOUT COMPLICATION: ICD-10-CM

## 2023-01-05 DIAGNOSIS — I10 ESSENTIAL HYPERTENSION: Primary | Chronic | ICD-10-CM

## 2023-01-05 DIAGNOSIS — E78.5 DYSLIPIDEMIA ASSOCIATED WITH TYPE 2 DIABETES MELLITUS: ICD-10-CM

## 2023-01-05 DIAGNOSIS — E66.2 OBESITY HYPOVENTILATION SYNDROME: ICD-10-CM

## 2023-01-05 DIAGNOSIS — I15.2 HYPERTENSION COMPLICATING DIABETES: Chronic | ICD-10-CM

## 2023-01-05 DIAGNOSIS — F41.1 GENERALIZED ANXIETY DISORDER: Chronic | ICD-10-CM

## 2023-01-05 DIAGNOSIS — E78.1 HYPERTRIGLYCERIDEMIA: Chronic | ICD-10-CM

## 2023-01-05 DIAGNOSIS — E87.5 HYPERKALEMIA: ICD-10-CM

## 2023-01-05 DIAGNOSIS — E11.59 HYPERTENSION COMPLICATING DIABETES: Chronic | ICD-10-CM

## 2023-01-05 DIAGNOSIS — E11.65 TYPE 2 DIABETES MELLITUS WITH HYPERGLYCEMIA, WITH LONG-TERM CURRENT USE OF INSULIN: Chronic | ICD-10-CM

## 2023-01-05 DIAGNOSIS — K76.0 NAFLD (NONALCOHOLIC FATTY LIVER DISEASE): ICD-10-CM

## 2023-01-05 DIAGNOSIS — G47.36 NOCTURNAL HYPOXEMIA DUE TO OBESITY: Chronic | ICD-10-CM

## 2023-01-05 DIAGNOSIS — F31.9 BIPOLAR 1 DISORDER: Chronic | ICD-10-CM

## 2023-01-05 PROCEDURE — 99999 PR PBB SHADOW E&M-EST. PATIENT-LVL V: CPT | Mod: PBBFAC,,, | Performed by: INTERNAL MEDICINE

## 2023-01-05 PROCEDURE — 99214 PR OFFICE/OUTPT VISIT, EST, LEVL IV, 30-39 MIN: ICD-10-PCS | Mod: S$PBB,,, | Performed by: INTERNAL MEDICINE

## 2023-01-05 PROCEDURE — 3074F PR MOST RECENT SYSTOLIC BLOOD PRESSURE < 130 MM HG: ICD-10-PCS | Mod: CPTII,,, | Performed by: INTERNAL MEDICINE

## 2023-01-05 PROCEDURE — 99999 PR PBB SHADOW E&M-EST. PATIENT-LVL V: ICD-10-PCS | Mod: PBBFAC,,, | Performed by: INTERNAL MEDICINE

## 2023-01-05 PROCEDURE — 1159F PR MEDICATION LIST DOCUMENTED IN MEDICAL RECORD: ICD-10-PCS | Mod: CPTII,,, | Performed by: INTERNAL MEDICINE

## 2023-01-05 PROCEDURE — 1159F MED LIST DOCD IN RCRD: CPT | Mod: CPTII,,, | Performed by: INTERNAL MEDICINE

## 2023-01-05 PROCEDURE — 3008F BODY MASS INDEX DOCD: CPT | Mod: CPTII,,, | Performed by: INTERNAL MEDICINE

## 2023-01-05 PROCEDURE — 3072F PR LOW RISK FOR RETINOPATHY: ICD-10-PCS | Mod: CPTII,,, | Performed by: INTERNAL MEDICINE

## 2023-01-05 PROCEDURE — 99214 OFFICE O/P EST MOD 30 MIN: CPT | Mod: S$PBB,,, | Performed by: INTERNAL MEDICINE

## 2023-01-05 PROCEDURE — 99215 OFFICE O/P EST HI 40 MIN: CPT | Mod: PBBFAC | Performed by: INTERNAL MEDICINE

## 2023-01-05 PROCEDURE — 3008F PR BODY MASS INDEX (BMI) DOCUMENTED: ICD-10-PCS | Mod: CPTII,,, | Performed by: INTERNAL MEDICINE

## 2023-01-05 PROCEDURE — 3078F PR MOST RECENT DIASTOLIC BLOOD PRESSURE < 80 MM HG: ICD-10-PCS | Mod: CPTII,,, | Performed by: INTERNAL MEDICINE

## 2023-01-05 PROCEDURE — 3078F DIAST BP <80 MM HG: CPT | Mod: CPTII,,, | Performed by: INTERNAL MEDICINE

## 2023-01-05 PROCEDURE — 3074F SYST BP LT 130 MM HG: CPT | Mod: CPTII,,, | Performed by: INTERNAL MEDICINE

## 2023-01-05 PROCEDURE — 3072F LOW RISK FOR RETINOPATHY: CPT | Mod: CPTII,,, | Performed by: INTERNAL MEDICINE

## 2023-01-05 RX ORDER — SPIRONOLACTONE 25 MG/1
25 TABLET ORAL DAILY
Qty: 90 TABLET | Refills: 3 | Status: SHIPPED | OUTPATIENT
Start: 2023-01-05 | End: 2023-02-01 | Stop reason: SDUPTHER

## 2023-01-05 RX ORDER — METOPROLOL SUCCINATE 50 MG/1
50 TABLET, EXTENDED RELEASE ORAL NIGHTLY
Qty: 90 TABLET | Refills: 3 | Status: SHIPPED | OUTPATIENT
Start: 2023-01-05 | End: 2023-02-01 | Stop reason: SDUPTHER

## 2023-01-05 RX ORDER — LANOLIN ALCOHOL/MO/W.PET/CERES
400 CREAM (GRAM) TOPICAL DAILY
Qty: 90 TABLET | Refills: 3 | Status: SHIPPED | OUTPATIENT
Start: 2023-01-05 | End: 2024-01-31 | Stop reason: SDUPTHER

## 2023-01-05 RX ORDER — FENOFIBRATE 145 MG/1
145 TABLET, FILM COATED ORAL DAILY
Qty: 90 TABLET | Refills: 3 | Status: SHIPPED | OUTPATIENT
Start: 2023-01-05 | End: 2024-03-18

## 2023-01-05 RX ORDER — AMLODIPINE AND VALSARTAN 10; 320 MG/1; MG/1
1 TABLET ORAL DAILY
Qty: 90 TABLET | Refills: 3 | Status: SHIPPED | OUTPATIENT
Start: 2023-01-05 | End: 2023-02-01 | Stop reason: SDUPTHER

## 2023-01-05 RX ORDER — FUROSEMIDE 20 MG/1
20 TABLET ORAL DAILY
Qty: 90 TABLET | Refills: 3 | Status: SHIPPED | OUTPATIENT
Start: 2023-01-05 | End: 2023-02-01 | Stop reason: SDUPTHER

## 2023-01-05 RX ORDER — ATORVASTATIN CALCIUM 20 MG/1
20 TABLET, FILM COATED ORAL NIGHTLY
Qty: 90 TABLET | Refills: 3 | Status: SHIPPED | OUTPATIENT
Start: 2023-01-05 | End: 2024-01-31 | Stop reason: SDUPTHER

## 2023-01-05 NOTE — PROGRESS NOTES
"Subjective:   Patient ID:  Yolanda Betts is a 52 y.o. female who presents for follow up of No chief complaint on file.      HPI  4/22/2020  A 50 yo female with htn diabetes obesity o2 desaturation nocturnal o2 therapy migraine asthma is following on htn. Her bp is improved she is more complaint with slat diabetes and meds intake. The dose adjustment has helped with the bp buy taking extra 5 mg of amlodipine.  Has uti and had shortness of breath necessitating an er visits. Feels much better. Has  Less  shortness of breath she got hydrated. Has no leg edema her nebs has helped control symptoms has no other issues clinically.htn better control no fluctuation hr improved.      1/6/2021  Here for preop clearance for carpal tunnel. She is asymptomatic cardiac wise she works as  has no complaints of chest pain or shortness of breath had gallbladder surgery last year no complication has been dropping items from hand  Has numbness and tingling she needs bilateral carpal tunnel done . She is also looking at having bariatric surgery.      3/11/2021 YVES HUYNH  Ms. Betts is a 50 year old female patient whose current medical conditions include HTN, obesity, hypertriglyceridemia, DM type II, obesity, hypoventilation syndrome on nocturnal O2, and schizoaffective disorder who presents today for routine follow-up. She returns today and states she is doing clinicalli.y well. Main issue is still bilateral hand pain/tingling/nubmness. Scheduled for CTR in April, previously cleared by Dr. Mccormick. No change in status since last visit. No chest pain, heaviness, or tightness. No symptoms concerning for angina. No lightheadedness, dizziness, near syncope, or syncope. Does admit to occasional palpitations, notices it when she is stressed or anxious, not especially bothersome. No s/s suggestive of CHF. Does report getting winded if pollen is "bad outside". BP well-controlled. Patient is compliant with her " medications. Still works as an . Needs updated labs, orders placed.     9/15/2021   FEELS WELL COMPLIANT WITH MEDS DIET REVIEW OF DIGITAL; MEDICINE SHOWED GOOD CONTROL BP DIABETES. . MTZ SNO LEG SWELLING FORGETS TO USE O2 THERAPY CONTINOUSLY. HAS NO TIA CLAUDICVATION PALPITATIONS. HAS FLUCTUATION IN WEIGHT TRIES TO BE COMPLIANT TO LOSE WEIGHT.     3/16/2022  Here for f/u has had a sleeve  On adkin's diet has been losing weight has been exercising . Has been having swimming sensation in her head when lying down moving head or walking on treadmill. She has narcolepsy getting her sleep eval done this month she is on o2 therapy at [facility]. Has no other complaints her lipids are improved remarkably.     6/30/2022  Comes in for follow up   BP was elevated, meds adjusted by PCP   Feels well now , and BP controlled  LDL not at goal given her DM   Stable on follow up     1.5.2023  Comes in for a 6 months follow up   Doing mostly well   She denies any chest pain, no ivory, no orthopnea  She h/o anxiety, stressed out at work   She has bilateral ankle swelling , worse in the evening, she does a lot of sitting, she also takes amlodipine       Past Medical History:   Diagnosis Date    Abnormal Pap smear of cervix     HPV genital warts    Anemia     Anxiety     Arthritis     Asthma 10/11/2016    Bipolar 1 disorder     Diabetes mellitus, type 2     Dyslipidemia associated with type 2 diabetes mellitus 5/13/2019    General anesthetics causing adverse effect in therapeutic use     Genital warts     GERD (gastroesophageal reflux disease)     Herpes simplex virus (HSV) infection     Hyperlipidemia     Hypertension     Hypertension complicating diabetes 5/5/2019    Migraine with aura and without status migrainosus, not intractable 3/21/2016    Mild persistent asthma without complication 10/11/2016    Morbid obesity with body mass index (BMI) of 45.0 to 49.9 in adult 8/3/2017    Obstructive sleep apnea     ALEN (obstructive sleep  "apnea)     2L per N/C q HS    Schizoaffective disorder, bipolar type 2019    Seasonal allergic rhinitis due to pollen 2019    Type 2 diabetes mellitus with hyperglycemia, with long-term current use of insulin 2017    Type 2 diabetes mellitus with hyperglycemia, without long-term current use of insulin 2017       Past Surgical History:   Procedure Laterality Date    abcess removed right back      BELT ABDOMINOPLASTY      BREAST SURGERY Bilateral 1996    Reduction     SECTION      X 2    COLONOSCOPY      COLONOSCOPY N/A 2018    Procedure: colonoscopy for iron deficiency anemia;  Surgeon: Frankie Sanchez MD;  Location: [facility] ENDO;  Service: Endoscopy;  Laterality: N/A;    COLONOSCOPY N/A 2018    Procedure: COLONOSCOPY;  Surgeon: Frankie Sanchez MD;  Location: [facility] ENDO;  Service: Endoscopy;  Laterality: N/A;    HYSTERECTOMY      w/ BSO; hypermenorrhea    MOUTH SURGERY      OOPHORECTOMY      hyst/bso, hypermenorrhea    ROBOT-ASSISTED CHOLECYSTECTOMY USING DA DARI XI N/A 1/3/2020    Procedure: XI ROBOTIC CHOLECYSTECTOMY;  Surgeon: Damir Driscoll MD;  Location: [facility] OR;  Service: General;  Laterality: N/A;    TOTAL REDUCTION MAMMOPLASTY      TUBAL LIGATION         Social History     Tobacco Use    Smoking status: Never    Smokeless tobacco: Never   Substance Use Topics    Alcohol use: Yes     Alcohol/week: 0.0 standard drinks     Comment: socially  No alcohol 72h prior to sx    Drug use: No       Family History   Problem Relation Age of Onset    Diabetes Mother     Hyperlipidemia Mother     Hypertension Mother     Asthma Mother     COPD Mother     Glaucoma Mother     Thyroid disease Mother     Anesthesia problems Mother         "almost had a cardiac arrest" , blood clots    Hypertension Father     Hyperlipidemia Father     Cancer Father         Brain, lung, liver, kidney    Heart disease Maternal Grandmother     Hyperlipidemia Maternal Grandmother     " "Hypertension Maternal Grandmother     Cataracts Maternal Grandmother     Diabetes Maternal Grandmother     Heart disease Maternal Grandfather     Hyperlipidemia Maternal Grandfather     Hypertension Maternal Grandfather     Glaucoma Maternal Grandfather     Cancer Maternal Grandfather     Cataracts Maternal Grandfather     Macular degeneration Maternal Grandfather     Diabetes Maternal Grandfather     Heart disease Paternal Grandmother     Hyperlipidemia Paternal Grandmother     Hypertension Paternal Grandmother     Cataracts Paternal Grandmother     Heart disease Paternal Grandfather     Hyperlipidemia Paternal Grandfather     Hypertension Paternal Grandfather     Cataracts Paternal Grandfather     Breast cancer Maternal Cousin     Breast cancer Maternal Cousin     Breast cancer Maternal Cousin     Breast cancer Maternal Cousin        Current Outpatient Medications   Medication Sig    albuterol (PROVENTIL/VENTOLIN HFA) 90 mcg/actuation inhaler Inhale 2 puffs into the lungs every 6 hours as needed for wheezing    ALPRAZolam (XANAX) 0.25 MG tablet Take 1 tablet (0.25 mg total) by mouth As instructed for Anxiety (take one tablet 60 minutes before MRI, if still feeling anxious take second tablet just before MRI).    amLODIPine (NORVASC) 10 MG tablet Take 10 mg by mouth every morning.    amlodipine-valsartan (EXFORGE)  mg per tablet Take 1 tablet by mouth once daily.    aspirin (ECOTRIN) 81 MG EC tablet Take 81 mg by mouth once daily.    atorvastatin (LIPITOR) 20 MG tablet Take 1 tablet (20 mg total) by mouth every evening.    BD INSULIN SYRINGE ULTRA-FINE 1/2 mL 30 gauge x 1/2" Syrg     blood sugar diagnostic (ONETOUCH ULTRA TEST) Strp Check blood glucose 4 times daily as directed and as needed (dispense insurance preferred brand or patient choice)    blood sugar diagnostic Strp 1 each by Misc.(Non-Drug; Combo Route) route 4 (four) times daily. [facility] Glucose Test Strips    blood-glucose meter Misc Use as " directed (Patient taking differently: Use as directed)    budesonide-formoterol 160-4.5 mcg (SYMBICORT) 160-4.5 mcg/actuation HFAA Inhale 2 puffs into the lungs every 12 (twelve) hours. Controller : Use spacer    cycloSPORINE 0.05 % Drop Place 1 drop into both eyes 2 (two) times daily.    DULoxetine (CYMBALTA) 60 MG capsule Take 1 capsule (60 mg total) by mouth once daily.    ergocalciferol (ERGOCALCIFEROL) 50,000 unit Cap Take 1 capsule twice weekly for 3 weeks then weekly    fenofibrate (TRICOR) 145 MG tablet Take 1 tablet (145 mg total) by mouth once daily.    flash glucose scanning reader ([employer] AMY 2 READER) Misc Use as directed to check blood sugar    fluticasone propionate (FLONASE) 50 mcg/actuation nasal spray 2 sprays (100 mcg total) by Each Nostril route once daily.    frovatriptan (FROVA) 2.5 MG tablet Take 1 tablet at onset of acute migraine. May take 1 more tablet 2 hours later if needed. (MAX 2 TABLET PER DAY. MAX 4 TABLETS PER WEEK.)    furosemide (LASIX) 20 MG tablet Take 1 tablet (20 mg total) by mouth once daily.    HEALTHYLAX 17 gram PwPk Take 17 g by mouth 2 (two) times daily.    insulin aspart U-100 (NOVOLOG FLEXPEN U-100 INSULIN) 100 unit/mL (3 mL) InPn pen Inject 10 Units into the skin 3 (three) times daily before meals. Use based on sliding scale    insulin glargine U-300 conc (TOUJEO MAX U-300 SOLOSTAR) 300 unit/mL (3 mL) insulin pen Inject 92 Units into the skin once daily.    [START ON 2/1/2023] lamoTRIgine (LAMICTAL) 100 MG tablet Take 1 tablet (100 mg total) by mouth once daily.    lamoTRIgine (LAMICTAL) 25 MG tablet Take 1 tablet (25 mg total) by mouth every morning for 14 days, THEN 2 tablets (50 mg total) every morning for 14 days.    lancets (ONETOUCH DELICA PLUS LANCET) 30 gauge Misc Use as directed 3 times daily    levocetirizine (XYZAL) 5 MG tablet Take 1 tablet (5 mg total) by mouth every evening.    LIDOcaine-prilocaine (EMLA) cream Apply topically up to 4 times per day to  "affected area for pain relief    lurasidone (LATUDA) 60 mg Tab tablet Take 1 tablet by mouth daily with 350 calories    metFORMIN (GLUCOPHAGE) 1000 MG tablet Take 1 tablet (1,000 mg total) by mouth 2 (two) times daily with meals.    metoprolol succinate (TOPROL-XL) 50 MG 24 hr tablet Take 1 tablet (50 mg total) by mouth every evening.    naltrexone capsule Take 1 capsule (4.5 mg total) by mouth every evening.    naproxen (NAPROSYN) 500 MG tablet Take 1 tablet (500 mg total) by mouth 2 (two) times daily.    omeprazole (PRILOSEC) 20 MG capsule Take 1 capsule (20 mg total) by mouth 2 (two) times daily before meals.    pen needle, diabetic (BD ULTRA-FINE MINI PEN NEEDLE) 31 gauge x 3/16" Ndle Use 5 a day    pen needle, diabetic (BD ULTRA-FINE MINI PEN NEEDLE) 31 gauge x 3/16" Ndle Use one needle 5 times a day    plecanatide (TRULANCE) 3 mg Tab Take 3 mg by mouth once daily.    spironolactone (ALDACTONE) 25 MG tablet Take 1 tablet (25 mg total) by mouth once daily.    tiZANidine (ZANAFLEX) 4 MG tablet Take 1 tablet (4 mg total) by mouth every 6 (six) hours as needed.    clonazePAM (KLONOPIN) 1 MG tablet Take 1 tablet (1 mg total) by mouth once daily.    diclofenac sodium (VOLTAREN) 1 % Gel Apply 2 grams topically 4 (four) times daily as needed.     No current facility-administered medications for this visit.     Current Outpatient Medications on File Prior to Visit   Medication Sig    albuterol (PROVENTIL/VENTOLIN HFA) 90 mcg/actuation inhaler Inhale 2 puffs into the lungs every 6 hours as needed for wheezing    ALPRAZolam (XANAX) 0.25 MG tablet Take 1 tablet (0.25 mg total) by mouth As instructed for Anxiety (take one tablet 60 minutes before MRI, if still feeling anxious take second tablet just before MRI).    amLODIPine (NORVASC) 10 MG tablet Take 10 mg by mouth every morning.    amlodipine-valsartan (EXFORGE)  mg per tablet Take 1 tablet by mouth once daily.    aspirin (ECOTRIN) 81 MG EC tablet Take 81 mg by " "mouth once daily.    atorvastatin (LIPITOR) 20 MG tablet Take 1 tablet (20 mg total) by mouth every evening.    BD INSULIN SYRINGE ULTRA-FINE 1/2 mL 30 gauge x 1/2" Syrg     blood sugar diagnostic (ONETOUCH ULTRA TEST) Strp Check blood glucose 4 times daily as directed and as needed (dispense insurance preferred brand or patient choice)    blood sugar diagnostic Strp 1 each by Misc.(Non-Drug; Combo Route) route 4 (four) times daily. [facility] Glucose Test Strips    blood-glucose meter Misc Use as directed (Patient taking differently: Use as directed)    budesonide-formoterol 160-4.5 mcg (SYMBICORT) 160-4.5 mcg/actuation HFAA Inhale 2 puffs into the lungs every 12 (twelve) hours. Controller : Use spacer    cycloSPORINE 0.05 % Drop Place 1 drop into both eyes 2 (two) times daily.    DULoxetine (CYMBALTA) 60 MG capsule Take 1 capsule (60 mg total) by mouth once daily.    ergocalciferol (ERGOCALCIFEROL) 50,000 unit Cap Take 1 capsule twice weekly for 3 weeks then weekly    fenofibrate (TRICOR) 145 MG tablet Take 1 tablet (145 mg total) by mouth once daily.    flash glucose scanning reader ([employer] AMY 2 READER) Misc Use as directed to check blood sugar    fluticasone propionate (FLONASE) 50 mcg/actuation nasal spray 2 sprays (100 mcg total) by Each Nostril route once daily.    frovatriptan (FROVA) 2.5 MG tablet Take 1 tablet at onset of acute migraine. May take 1 more tablet 2 hours later if needed. (MAX 2 TABLET PER DAY. MAX 4 TABLETS PER WEEK.)    furosemide (LASIX) 20 MG tablet Take 1 tablet (20 mg total) by mouth once daily.    HEALTHYLAX 17 gram PwPk Take 17 g by mouth 2 (two) times daily.    insulin aspart U-100 (NOVOLOG FLEXPEN U-100 INSULIN) 100 unit/mL (3 mL) InPn pen Inject 10 Units into the skin 3 (three) times daily before meals. Use based on sliding scale    insulin glargine U-300 conc (TOUJEO MAX U-300 SOLOSTAR) 300 unit/mL (3 mL) insulin pen Inject 92 Units into the skin once daily.    [START ON " "2/1/2023] lamoTRIgine (LAMICTAL) 100 MG tablet Take 1 tablet (100 mg total) by mouth once daily.    lamoTRIgine (LAMICTAL) 25 MG tablet Take 1 tablet (25 mg total) by mouth every morning for 14 days, THEN 2 tablets (50 mg total) every morning for 14 days.    lancets (ONETOUCH DELICA PLUS LANCET) 30 gauge Misc Use as directed 3 times daily    levocetirizine (XYZAL) 5 MG tablet Take 1 tablet (5 mg total) by mouth every evening.    LIDOcaine-prilocaine (EMLA) cream Apply topically up to 4 times per day to affected area for pain relief    lurasidone (LATUDA) 60 mg Tab tablet Take 1 tablet by mouth daily with 350 calories    metFORMIN (GLUCOPHAGE) 1000 MG tablet Take 1 tablet (1,000 mg total) by mouth 2 (two) times daily with meals.    metoprolol succinate (TOPROL-XL) 50 MG 24 hr tablet Take 1 tablet (50 mg total) by mouth every evening.    naltrexone capsule Take 1 capsule (4.5 mg total) by mouth every evening.    naproxen (NAPROSYN) 500 MG tablet Take 1 tablet (500 mg total) by mouth 2 (two) times daily.    omeprazole (PRILOSEC) 20 MG capsule Take 1 capsule (20 mg total) by mouth 2 (two) times daily before meals.    pen needle, diabetic (BD ULTRA-FINE MINI PEN NEEDLE) 31 gauge x 3/16" Ndle Use 5 a day    pen needle, diabetic (BD ULTRA-FINE MINI PEN NEEDLE) 31 gauge x 3/16" Ndle Use one needle 5 times a day    plecanatide (TRULANCE) 3 mg Tab Take 3 mg by mouth once daily.    spironolactone (ALDACTONE) 25 MG tablet Take 1 tablet (25 mg total) by mouth once daily.    tiZANidine (ZANAFLEX) 4 MG tablet Take 1 tablet (4 mg total) by mouth every 6 (six) hours as needed.    clonazePAM (KLONOPIN) 1 MG tablet Take 1 tablet (1 mg total) by mouth once daily.    diclofenac sodium (VOLTAREN) 1 % Gel Apply 2 grams topically 4 (four) times daily as needed.    [DISCONTINUED] glucagon, human recombinant, (GLUCAGON EMERGENCY KIT, HUMAN,) 1 mg SolR Inject 1 mg into the muscle as needed. Emergency use    [DISCONTINUED] valsartan (DIOVAN) " 320 MG tablet Take 1 tablet (320 mg total) by mouth once daily.     No current facility-administered medications on file prior to visit.     Review of patient's allergies indicates:   Allergen Reactions    Codeine Itching    Hydromorphone Other (See Comments)     Can't wake up for long time if taken  Slow to wake up after surgery after receiving    Aleve [naproxen sodium]     Lyrica [pregabalin] Itching    Motrin [ibuprofen]     Neuromuscular blockers, steroidal Hives     some    Latex, natural rubber Rash    Morphine Rash     itching    Norco [hydrocodone-acetaminophen] Itching, Rash and Hallucinations    Seconal [secobarbital sodium] Rash     itching    Tylox [oxycodone-acetaminophen] Rash       Review of Systems   Constitutional: Positive for weight loss. Negative for diaphoresis, malaise/fatigue and weight gain.   HENT:  Negative for hoarse voice.    Eyes:  Negative for double vision and visual disturbance.   Cardiovascular:  Negative for chest pain, claudication, cyanosis, dyspnea on exertion, irregular heartbeat, leg swelling, near-syncope, orthopnea, palpitations, paroxysmal nocturnal dyspnea and syncope.   Respiratory:  Negative for cough, hemoptysis, shortness of breath and snoring.    Hematologic/Lymphatic: Negative for bleeding problem. Does not bruise/bleed easily.   Skin:  Negative for color change and poor wound healing.   Musculoskeletal:  Negative for muscle cramps, muscle weakness and myalgias.   Gastrointestinal:  Negative for bloating, abdominal pain, change in bowel habit, diarrhea, heartburn, hematemesis, hematochezia, melena and nausea.   Neurological:  Positive for dizziness and vertigo. Negative for excessive daytime sleepiness, headaches, light-headedness, loss of balance, numbness and weakness.   Psychiatric/Behavioral:  Negative for memory loss. The patient does not have insomnia.    Allergic/Immunologic: Negative for hives.     Objective:   Physical Exam  Constitutional:       General:  "She is not in acute distress.     Appearance: Normal appearance. She is well-developed. She is not ill-appearing.   HENT:      Head: Normocephalic and atraumatic.   Eyes:      General: No scleral icterus.     Pupils: Pupils are equal, round, and reactive to light.   Neck:      Thyroid: No thyromegaly.      Vascular: Normal carotid pulses. No carotid bruit, hepatojugular reflux or JVD.      Trachea: No tracheal deviation.   Cardiovascular:      Rate and Rhythm: Normal rate and regular rhythm.      Pulses: Normal pulses.      Heart sounds: Normal heart sounds. No murmur heard.    No friction rub. No gallop.   Pulmonary:      Effort: Pulmonary effort is normal. No respiratory distress.      Breath sounds: Normal breath sounds. No wheezing, rhonchi or rales.   Chest:      Chest wall: No tenderness.   Abdominal:      General: Bowel sounds are normal. There is no abdominal bruit.      Palpations: Abdomen is soft. There is no hepatomegaly or pulsatile mass.      Tenderness: There is no abdominal tenderness.      Comments: Obese    Musculoskeletal:      Right shoulder: No deformity.      Cervical back: Normal range of motion and neck supple.   Skin:     General: Skin is warm and dry.      Findings: No erythema or rash.      Nails: There is no clubbing.   Neurological:      Mental Status: She is alert and oriented to person, place, and time.      Cranial Nerves: No cranial nerve deficit.      Coordination: Coordination normal.   Psychiatric:         Speech: Speech normal.         Behavior: Behavior normal.     Vitals:    01/05/23 1357   BP: 104/68   Pulse: 80   SpO2: 98%   Weight: 82.1 kg (181 lb)   Height: 4' 8" (1.422 m)     Lab Results   Component Value Date    CHOL 131 09/21/2022    CHOL 187 06/24/2022    CHOL 174 03/08/2022     Lab Results   Component Value Date    HDL 38 (L) 09/21/2022    HDL 38 (L) 06/24/2022    HDL 57 03/08/2022     Lab Results   Component Value Date    LDLCALC 43.8 (L) 09/21/2022    LDLCALC 101.8 " 06/24/2022    LDLCALC 98.2 03/08/2022     Lab Results   Component Value Date    TRIG 246 (H) 09/21/2022    TRIG 236 (H) 06/24/2022    TRIG 94 03/08/2022     Lab Results   Component Value Date    CHOLHDL 29.0 09/21/2022    CHOLHDL 20.3 06/24/2022    CHOLHDL 32.8 03/08/2022       Chemistry        Component Value Date/Time     09/21/2022 0908    K 4.8 09/21/2022 0908     09/21/2022 0908    CO2 27 09/21/2022 0908    BUN 19 09/21/2022 0908    CREATININE 0.9 09/21/2022 0908     (H) 09/21/2022 0908        Component Value Date/Time    CALCIUM 9.8 09/21/2022 0908    ALKPHOS 59 09/21/2022 0908    AST 11 09/21/2022 0908    ALT 14 09/21/2022 0908    BILITOT 0.2 09/21/2022 0908    ESTGFRAFRICA >60.0 03/08/2022 0735    EGFRNONAA >60.0 03/08/2022 0735          Lab Results   Component Value Date    TSH 1.730 03/24/2021     Lab Results   Component Value Date    INR 1.0 06/26/2020    INR 0.9 11/23/2016     Lab Results   Component Value Date    WBC 8.66 09/21/2022    HGB 11.3 (L) 09/21/2022    HCT 36.0 (L) 09/21/2022    MCV 86 09/21/2022     (H) 09/21/2022     BMP  Sodium   Date Value Ref Range Status   09/21/2022 140 136 - 145 mmol/L Final     Potassium   Date Value Ref Range Status   09/21/2022 4.8 3.5 - 5.1 mmol/L Final     Chloride   Date Value Ref Range Status   09/21/2022 105 95 - 110 mmol/L Final     CO2   Date Value Ref Range Status   09/21/2022 27 23 - 29 mmol/L Final     BUN   Date Value Ref Range Status   09/21/2022 19 6 - 20 mg/dL Final     Creatinine   Date Value Ref Range Status   09/21/2022 0.9 0.5 - 1.4 mg/dL Final     Calcium   Date Value Ref Range Status   09/21/2022 9.8 8.7 - 10.5 mg/dL Final     Anion Gap   Date Value Ref Range Status   09/21/2022 8 8 - 16 mmol/L Final     eGFR if    Date Value Ref Range Status   03/08/2022 >60.0 >60 mL/min/1.73 m^2 Final     eGFR if non    Date Value Ref Range Status   03/08/2022 >60.0 >60 mL/min/1.73 m^2 Final     Comment:      Calculation used to obtain the estimated glomerular filtration  rate (eGFR) is the CKD-EPI equation.        CrCl cannot be calculated (Patient's most recent lab result is older than the maximum 7 days allowed.).  Lab Results   Component Value Date    HGBA1C 6.4 (H) 09/21/2022       Assessment:   Essential hypertension  -     amlodipine-valsartan (EXFORGE)  mg per tablet; Take 1 tablet by mouth once daily.  Dispense: 90 tablet; Refill: 3  -     furosemide (LASIX) 20 MG tablet; Take 1 tablet (20 mg total) by mouth once daily.  Dispense: 90 tablet; Refill: 3  -     metoprolol succinate (TOPROL-XL) 50 MG 24 hr tablet; Take 1 tablet (50 mg total) by mouth every evening.  Dispense: 90 tablet; Refill: 3  -     spironolactone (ALDACTONE) 25 MG tablet; Take 1 tablet (25 mg total) by mouth once daily.  Dispense: 90 tablet; Refill: 3    Hypertension complicating diabetes    Hypertriglyceridemia  -     fenofibrate (TRICOR) 145 MG tablet; Take 1 tablet (145 mg total) by mouth once daily.  Dispense: 90 tablet; Refill: 3    Bipolar 1 disorder    Type 2 diabetes mellitus with hyperglycemia, with long-term current use of insulin    Generalized anxiety disorder    Moderate persistent asthma without complication    Nocturnal hypoxemia due to obesity - WITHOUT ALEN    Obesity hypoventilation syndrome    NAFLD (nonalcoholic fatty liver disease)    Dyslipidemia associated with type 2 diabetes mellitus  -     atorvastatin (LIPITOR) 20 MG tablet; Take 1 tablet (20 mg total) by mouth every evening.  Dispense: 90 tablet; Refill: 3    Hyperaldosteronism  -     furosemide (LASIX) 20 MG tablet; Take 1 tablet (20 mg total) by mouth once daily.  Dispense: 90 tablet; Refill: 3  -     spironolactone (ALDACTONE) 25 MG tablet; Take 1 tablet (25 mg total) by mouth once daily.  Dispense: 90 tablet; Refill: 3    Hyperkalemia  -     furosemide (LASIX) 20 MG tablet; Take 1 tablet (20 mg total) by mouth once daily.  Dispense: 90 tablet; Refill:  3          Plan:   Continue current therapy  Cardiac low salt diet.  Risk factor modification and excercise program.  bp under better control   Reviewed current meds as above  LDL at goal  Angina free, no COATS, htn controlled    Compression stockings   Reviewed all tests and above medical conditions with patient in detail and formulated treatment plan.  Risk factor modification discussed.   Cardiac low salt diet discussed.  Maintaining healthy weight and weight loss goals were discussed in clinic.  RTC with in 6months

## 2023-01-06 DIAGNOSIS — F31.30 BIPOLAR I DISORDER, MOST RECENT EPISODE (OR CURRENT) DEPRESSED: ICD-10-CM

## 2023-01-09 RX ORDER — LURASIDONE HYDROCHLORIDE 60 MG/1
TABLET, FILM COATED ORAL
Qty: 30 TABLET | Refills: 2 | Status: SHIPPED | OUTPATIENT
Start: 2023-01-09 | End: 2023-06-13 | Stop reason: DRUGHIGH

## 2023-01-10 ENCOUNTER — PATIENT MESSAGE (OUTPATIENT)
Dept: PSYCHIATRY | Facility: CLINIC | Age: 53
End: 2023-01-10
Payer: MEDICAID

## 2023-01-10 ENCOUNTER — PATIENT MESSAGE (OUTPATIENT)
Dept: GASTROENTEROLOGY | Facility: CLINIC | Age: 53
End: 2023-01-10
Payer: MEDICAID

## 2023-01-10 NOTE — TELEPHONE ENCOUNTER
----- Message from Remigio Mauro sent at 1/10/2023 12:08 PM CST -----  Contact: Pt [phone]  Requesting an RX refill or new RX.  Is this a refill or new RX: refill   RX name and strength (copy/paste from chart): Trulance   Is this a 30 day or 90 day RX:   Pharmacy name and phone # (copy/paste from chart):  [id number] - [address] [place] [address] AT [facility]   Phone:  [phone]  Fax:  [phone]    The doctors have asked that we provide their patients with the following 2 reminders -- prescription refills can take up to 72 hours, and a friendly reminder that in the future you can use your [facility] account to request refills: yes

## 2023-01-11 ENCOUNTER — TELEPHONE (OUTPATIENT)
Dept: PSYCHIATRY | Facility: CLINIC | Age: 53
End: 2023-01-11
Payer: MEDICAID

## 2023-01-11 RX ORDER — PLECANATIDE 3 MG/1
3 TABLET ORAL DAILY
Qty: 90 TABLET | Refills: 0 | Status: SHIPPED | OUTPATIENT
Start: 2023-01-11 | End: 2023-01-26

## 2023-01-11 NOTE — TELEPHONE ENCOUNTER
KALEB informed the patient that there are no mental health IOP programs that are held during the evening time. KALEB advised that FMLA would be beneficial to obtain due to the fact that IOP sessions will conflict with her work schedule. Patient expressed understanding and no other assistance is needed at this time. SW will remain available.

## 2023-01-12 ENCOUNTER — DOCUMENTATION ONLY (OUTPATIENT)
Dept: PSYCHIATRY | Facility: CLINIC | Age: 53
End: 2023-01-12
Payer: MEDICAID

## 2023-01-12 NOTE — LETTER
January 12, 2023        Mariam Young, PhD, [facility]  [address]  [place] [address]             [facility]  [address]  [place] [address]  Phone: [phone]  Fax: [phone]   Patient: Yolanda Betts   MR Number: [id number]   YOB: 1970   Date of Visit: 1/12/2023       Dear Dr. Young:    Thank you for referring Yolanda Betts to me for case management.     The patient agreed with the referral to case management to enroll in an intensive outpatient program. The program agreed upon is located at [facility]. A referral was forwarded to this agency for intake on 01/09/2023. Esperanza routed the fax confirmation and the agencys information to staff member, Tiarra Mckeon, for follow-up procedures.   Additional information from this patient's recent visit with  and any enrollment follow-up notes can be found in the patient's chart under sections: Encounters and Media. The patient's discharge documents can be obtained from the facility following completion of the IOP, upon request.      Please contact if there's any further assistance needed.     Sincerely,      Sunny Polo LMSW

## 2023-01-12 NOTE — PROGRESS NOTES
Department of Psychiatry  Case Management Follow-Up        Visit type: in-person  The chief complaint leading to consultation is: Intensive Outpatient Program Recommended    60 minutes of total time spent on the encounter, which includes face to face time and non-face to face time preparing to see the patient (eg, review of referral notes), Obtaining and/or reviewing separately obtained history, Documenting information in the electronic or other health record, Independently interpreting results of research (not separately reported) and communicating results to the patient/family/caregiver.    Patient Name and   Yolanda Betts, 1970    Referring Provider  Dr. Mariam Young    Diagnosis  1. Need for case management follow-up         Notes    KALEB met with Patient in-office on 2023 to follow up after Pt was seen by the psychiatric physician. KALEB explained role and reviewed the referral.     The patient agreed to the referral to case management. KALEB informed the patient about the details of enrollment and service provided by an intensive outpatient program. SW explored options for IOP along with the patient. Pt reported that she is a little hesitant but she is interested in exploring IOP options. Patient agreed to have a referral sent to [facility]. KALEB informed the patient on the specifics of the facility's IOP and provided the patient with the agency's contact details. The patient expressed understanding and no additional assistance was necessary at this time. KALEB sent a referral via fax to the agency. KALEB will continue to remain available.     Resources  [facility]  Address: [address] [place] [address]  Phone: [phone] Fax:[phone]       Total Time  60 minutes

## 2023-01-13 ENCOUNTER — HOSPITAL ENCOUNTER (OUTPATIENT)
Dept: RADIOLOGY | Facility: HOSPITAL | Age: 53
Discharge: HOME | End: 2023-01-13
Attending: PHYSICIAN ASSISTANT
Payer: MEDICAID

## 2023-01-13 ENCOUNTER — OFFICE VISIT (OUTPATIENT)
Dept: RHEUMATOLOGY | Facility: CLINIC | Age: 53
End: 2023-01-13
Payer: MEDICAID

## 2023-01-13 VITALS
SYSTOLIC BLOOD PRESSURE: 137 MMHG | DIASTOLIC BLOOD PRESSURE: 82 MMHG | WEIGHT: 180.56 LBS | HEART RATE: 69 BPM | HEIGHT: 56 IN | BODY MASS INDEX: 40.62 KG/M2

## 2023-01-13 DIAGNOSIS — M79.7 FIBROMYALGIA: ICD-10-CM
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
## 2023-01-13 DIAGNOSIS — E11.42 DIABETIC POLYNEUROPATHY ASSOCIATED WITH TYPE 2 DIABETES MELLITUS: Primary | Chronic | ICD-10-CM

## 2023-01-13 DIAGNOSIS — M54.50 LUMBOSACRAL PAIN: ICD-10-CM

## 2023-01-13 PROCEDURE — 4010F PR ACE/ARB THEARPY RXD/TAKEN: ICD-10-PCS | Mod: CPTII,,, | Performed by: PHYSICIAN ASSISTANT

## 2023-01-13 PROCEDURE — 72202 X-RAY EXAM SI JOINTS 3/> VWS: CPT | Mod: TC

## 2023-01-13 PROCEDURE — 73030 XR SHOULDER COMPLETE 2 OR MORE VIEWS RIGHT: ICD-10-PCS | Mod: 26,RT,, | Performed by: RADIOLOGY

## 2023-01-13 PROCEDURE — 1159F PR MEDICATION LIST DOCUMENTED IN MEDICAL RECORD: ICD-10-PCS | Mod: CPTII,,, | Performed by: PHYSICIAN ASSISTANT

## 2023-01-13 PROCEDURE — 99999 PR PBB SHADOW E&M-EST. PATIENT-LVL V: CPT | Mod: PBBFAC,,, | Performed by: PHYSICIAN ASSISTANT

## 2023-01-13 PROCEDURE — 3008F BODY MASS INDEX DOCD: CPT | Mod: CPTII,,, | Performed by: PHYSICIAN ASSISTANT

## 2023-01-13 PROCEDURE — 3079F DIAST BP 80-89 MM HG: CPT | Mod: CPTII,,, | Performed by: PHYSICIAN ASSISTANT

## 2023-01-13 PROCEDURE — 99214 OFFICE O/P EST MOD 30 MIN: CPT | Mod: S$PBB,,, | Performed by: PHYSICIAN ASSISTANT

## 2023-01-13 PROCEDURE — 3079F PR MOST RECENT DIASTOLIC BLOOD PRESSURE 80-89 MM HG: ICD-10-PCS | Mod: CPTII,,, | Performed by: PHYSICIAN ASSISTANT

## 2023-01-13 PROCEDURE — 3075F PR MOST RECENT SYSTOLIC BLOOD PRESS GE 130-139MM HG: ICD-10-PCS | Mod: CPTII,,, | Performed by: PHYSICIAN ASSISTANT

## 2023-01-13 PROCEDURE — 72202 X-RAY EXAM SI JOINTS 3/> VWS: CPT | Mod: 26,,, | Performed by: RADIOLOGY

## 2023-01-13 PROCEDURE — 99999 PR PBB SHADOW E&M-EST. PATIENT-LVL V: ICD-10-PCS | Mod: PBBFAC,,, | Performed by: PHYSICIAN ASSISTANT

## 2023-01-13 PROCEDURE — 3072F PR LOW RISK FOR RETINOPATHY: ICD-10-PCS | Mod: CPTII,,, | Performed by: PHYSICIAN ASSISTANT

## 2023-01-13 PROCEDURE — 73030 X-RAY EXAM OF SHOULDER: CPT | Mod: 26,RT,, | Performed by: RADIOLOGY

## 2023-01-13 PROCEDURE — 3072F LOW RISK FOR RETINOPATHY: CPT | Mod: CPTII,,, | Performed by: PHYSICIAN ASSISTANT

## 2023-01-13 PROCEDURE — 3075F SYST BP GE 130 - 139MM HG: CPT | Mod: CPTII,,, | Performed by: PHYSICIAN ASSISTANT

## 2023-01-13 PROCEDURE — 99215 OFFICE O/P EST HI 40 MIN: CPT | Mod: PBBFAC | Performed by: PHYSICIAN ASSISTANT

## 2023-01-13 PROCEDURE — 3008F PR BODY MASS INDEX (BMI) DOCUMENTED: ICD-10-PCS | Mod: CPTII,,, | Performed by: PHYSICIAN ASSISTANT

## 2023-01-13 PROCEDURE — 1159F MED LIST DOCD IN RCRD: CPT | Mod: CPTII,,, | Performed by: PHYSICIAN ASSISTANT

## 2023-01-13 PROCEDURE — 99214 PR OFFICE/OUTPT VISIT, EST, LEVL IV, 30-39 MIN: ICD-10-PCS | Mod: S$PBB,,, | Performed by: PHYSICIAN ASSISTANT

## 2023-01-13 PROCEDURE — 72202 XR SACROILIAC JOINTS COMPLETE: ICD-10-PCS | Mod: 26,,, | Performed by: RADIOLOGY

## 2023-01-13 PROCEDURE — 1160F RVW MEDS BY RX/DR IN RCRD: CPT | Mod: CPTII,,, | Performed by: PHYSICIAN ASSISTANT

## 2023-01-13 PROCEDURE — 73030 X-RAY EXAM OF SHOULDER: CPT | Mod: TC,RT

## 2023-01-13 PROCEDURE — 4010F ACE/ARB THERAPY RXD/TAKEN: CPT | Mod: CPTII,,, | Performed by: PHYSICIAN ASSISTANT

## 2023-01-13 PROCEDURE — 1160F PR REVIEW ALL MEDS BY PRESCRIBER/CLIN PHARMACIST DOCUMENTED: ICD-10-PCS | Mod: CPTII,,, | Performed by: PHYSICIAN ASSISTANT

## 2023-01-13 RX ORDER — BUTALBITAL, ACETAMINOPHEN AND CAFFEINE 50; 325; 40 MG/1; MG/1; MG/1
TABLET ORAL
COMMUNITY
Start: 2022-10-07 | End: 2023-06-13 | Stop reason: SDUPTHER

## 2023-01-13 RX ORDER — PROMETHAZINE HYDROCHLORIDE 12.5 MG/1
TABLET ORAL
COMMUNITY
Start: 2022-08-22 | End: 2024-02-21

## 2023-01-13 NOTE — PROGRESS NOTES
Subjective:      Patient ID: Yolanda Betts is a 52 y.o. female.    Chief Complaint: Fibromyalgia      HPI   Yolanda Betts  is a 52 y.o. female who is her to f/u on fibromyalgia.  Has seen Dr. Phillips who has her on Cymbalta 60 mg q.h.s..  He also switched her from Flexeril to tizanidine.  Both have helped her fibromyalgia symptoms.  She is still complaining of widespread diffuse pain.  She has failed gabapentin and Lyrica both in the past.  She does not take any pain medication.  Also complaining of some paraspinal pain in the cervical region and right arm/shoulder pain today as well.  Had C spine MRI recently and has f/uy w IPM next week.    At her 09/2022 visit, ordered labs.  CRP was elevated so I put her on prednisone 10 mg for 4 weeks.  I asked her to go back for blood work 4 weeks later to evaluate inflammatory markers.  She forgot to go and do that.  She says the steroids did help her pain.  They also increased blood sugars.  She has not had an A1c since prior to the steroid trial.  Complaining also of lumbosacral pain as well as back pain.  Pain level is 9/10 in severity.  She denies radiating symptoms from the back.    Unfortunately, today pain is pretty bad.  She had a fall in the shower yesterday.  Having some increased pain in her right shoulder.    She has had an EMG nerve conduction study showing carpal tunnel syndrome.  She is established with Dr. Roth.     Patient denies fevers, chills, photosensitivity, eye pain, shortness of breath, chest pain, hematuria, blood in the stool, rash, sicca symptoms, raynauds, finger ulcerations.  Rheumatologic systems otherwise negative.    Serologies/Labs:  Neg RF, CCP, ISAAC  Neg HLA B27  Previously elevated ESR/CRP   Current Treatment:  Tizanidine 4 mg tid  Cymbalta 60 mg qhs  Previous Treatment:   Flexeril  Lyrica  Gabapentin  Savella  Low dose Naltrexone - no relief          Past Medical History:   Diagnosis Date    Abnormal Pap smear of cervix     HPV  "genital warts    Anemia     Anxiety     Arthritis     Asthma 10/11/2016    Bipolar 1 disorder     Diabetes mellitus, type 2     Dyslipidemia associated with type 2 diabetes mellitus 5/13/2019    General anesthetics causing adverse effect in therapeutic use     Genital warts     GERD (gastroesophageal reflux disease)     Herpes simplex virus (HSV) infection     Hyperlipidemia     Hypertension     Hypertension complicating diabetes 5/5/2019    Migraine with aura and without status migrainosus, not intractable 3/21/2016    Mild persistent asthma without complication 10/11/2016    Morbid obesity with body mass index (BMI) of 45.0 to 49.9 in adult 8/3/2017    Obstructive sleep apnea     ALEN (obstructive sleep apnea)     2L per N/C q HS    Schizoaffective disorder, bipolar type 4/25/2019    Seasonal allergic rhinitis due to pollen 5/13/2019    Type 2 diabetes mellitus with hyperglycemia, with long-term current use of insulin 12/21/2017    Type 2 diabetes mellitus with hyperglycemia, without long-term current use of insulin 12/21/2017     Family History   Problem Relation Age of Onset    Diabetes Mother     Hyperlipidemia Mother     Hypertension Mother     Asthma Mother     COPD Mother     Glaucoma Mother     Thyroid disease Mother     Anesthesia problems Mother         "almost had a cardiac arrest" , blood clots    Hypertension Father     Hyperlipidemia Father     Cancer Father         Brain, lung, liver, kidney    Heart disease Maternal Grandmother     Hyperlipidemia Maternal Grandmother     Hypertension Maternal Grandmother     Cataracts Maternal Grandmother     Diabetes Maternal Grandmother     Heart disease Maternal Grandfather     Hyperlipidemia Maternal Grandfather     Hypertension Maternal Grandfather     Glaucoma Maternal Grandfather     Cancer Maternal Grandfather     Cataracts Maternal Grandfather     Macular degeneration Maternal Grandfather     Diabetes Maternal Grandfather     Heart disease Paternal " Grandmother     Hyperlipidemia Paternal Grandmother     Hypertension Paternal Grandmother     Cataracts Paternal Grandmother     Heart disease Paternal Grandfather     Hyperlipidemia Paternal Grandfather     Hypertension Paternal Grandfather     Cataracts Paternal Grandfather     Breast cancer Maternal Cousin     Breast cancer Maternal Cousin     Breast cancer Maternal Cousin     Breast cancer Maternal Cousin      Social History     Socioeconomic History    Marital status: Single   Tobacco Use    Smoking status: Never    Smokeless tobacco: Never   Substance and Sexual Activity    Alcohol use: Yes     Alcohol/week: 0.0 standard drinks     Comment: socially  No alcohol 72h prior to sx    Drug use: No    Sexual activity: Yes     Partners: Male     Birth control/protection: Surgical     Comment: hyst   Social History Narrative    Long-term care nurse     Social Determinants of Health     Financial Resource Strain: High Risk    Difficulty of Paying Living Expenses: Hard   Food Insecurity: Food Insecurity Present    Worried About Running Out of Food in the Last Year: Often true    Ran Out of Food in the Last Year: Often true   Transportation Needs: No Transportation Needs    Lack of Transportation (Medical): No    Lack of Transportation (Non-Medical): No   Physical Activity: Inactive    Days of Exercise per Week: 0 days    Minutes of Exercise per Session: 0 min   Stress: Stress Concern Present    Feeling of Stress : Rather much   Social Connections: Moderately Isolated    Frequency of Communication with Friends and Family: Twice a week    Frequency of Social Gatherings with Friends and Family: Twice a week    Attends Cheondo[employer] Services: More than 4 times per year    Active Member of Clubs or Organizations: No    Attends Club or Organization Meetings: Never    Marital Status: Never    Housing Stability: High Risk    Unable to Pay for Housing in the Last Year: Yes    Number of Places Lived in the Last Year: 1     "Unstable Housing in the Last Year: No     Review of patient's allergies indicates:   Allergen Reactions    Codeine Itching    Hydromorphone Other (See Comments)     Can't wake up for long time if taken  Slow to wake up after surgery after receiving    Aleve [naproxen sodium]     Lyrica [pregabalin] Itching    Motrin [ibuprofen]     Neuromuscular blockers, steroidal Hives     some    Latex, natural rubber Rash    Morphine Rash     itching    Norco [hydrocodone-acetaminophen] Itching, Rash and Hallucinations    Seconal [secobarbital sodium] Rash     itching    Tylox [oxycodone-acetaminophen] Rash       Objective:   /82   Pulse 69   Ht 4' 8" (1.422 m)   Wt 81.9 kg (180 lb 8.9 oz)   BMI 40.48 kg/m²   Immunization History   Administered Date(s) Administered    COVID-19, MRNA, LN-S, PF (MODERNA FULL 0.5 ML DOSE) 03/30/2021, 04/26/2021    Influenza 10/01/2018    Influenza - Quadrivalent - PF *Preferred* (6 months and older) 12/21/2017, 09/24/2020    Pneumococcal Polysaccharide - 23 Valent 01/31/2017    Tdap 12/21/2017       Physical Exam   Constitutional: She is oriented to person, place, and time. No distress.   HENT:   Head: Normocephalic and atraumatic.   Pulmonary/Chest: Effort normal.   Abdominal: She exhibits no distension.   Musculoskeletal:         General: No swelling or tenderness. Normal range of motion.      Cervical back: Normal range of motion.   Lymphadenopathy:     She has no cervical adenopathy.   Neurological: She is alert and oriented to person, place, and time.   Skin: Skin is warm and dry. No rash noted.   Psychiatric: Mood normal.   Nursing note and vitals reviewed.    Mulitple tender trigger pionts  Sign ttp Bilat SI joints        No results found for this or any previous visit (from the past 672 hour(s)).    No results found for: TBGOLDPLUS   Lab Results   Component Value Date    HEPAIGM Negative 09/04/2019    HEPBIGM Negative 09/04/2019    HEPCAB Negative 09/04/2019        Imaging  I have " personally reviewed images and reports as below.  I agree with the interpretation.  MRI Cervical Spine Without Contrast  Narrative: EXAMINATION:  MRI CERVICAL SPINE WITHOUT CONTRAST    CLINICAL HISTORY:  Myelopathy, chronic, cervical spine;  Strain of other muscles, fascia and tendons at shoulder and upper arm level, right arm, initial encounter    TECHNIQUE:  Multiplanar, multisequential images are acquired through the cervical spine.    COMPARISON:  None    FINDINGS:  The vertebral body heights and alignment are maintained throughout the cervical spine.    No abnormal vertebral body marrow signal.    Multilevel intervertebral disc desiccation.    No abnormal signal at the cervicomedullary junction or cervical spinal cord.    C2-3: Normal intervertebral disc contour. No central canal or neural foraminal narrowing.    C3-4: Normal intervertebral disc contour.  Bilateral uncovertebral and facet hypertrophy.  No central canal or neural foraminal narrowing.    C4-5: Intervertebral disc bulge.  Bilateral uncovertebral and facet hypertrophy.  No central canal narrowing.  Mild bilateral neural foraminal narrowing.    C5-6: Intervertebral disc bulge.  Bilateral uncovertebral and facet hypertrophy.  Minimal bilateral neural foraminal narrowing.  No central canal narrowing.    C6-7: Intervertebral disc bulge.  No central canal or neural foraminal narrowing.    C7-T1: Normal intervertebral disc contour. No central canal or neural foraminal narrowing.    The paravertebral soft tissues are unremarkable.  Impression: At C4-5, there is mild intervertebral disc bulge with uncovertebral and facet hypertrophy mildly narrowing the bilateral neural foramina.    At C5-6, there is an intervertebral disc bulge with uncovertebral and facet hypertrophy minimally narrowing the bilateral neural foramina.    Electronically signed by: Grey Barney  Date:    01/04/2023  Time:    18:05    MRI Shoulder Without Contrast Right  Order:  [id number]  Status: Final result     Visible to patient: Yes (seen)     Next appt: 01/17/2023 at 08:20 AM in Pain Medicine (Bernice Womack PA-C)     Dx: Chronic right shoulder pain; Nontraum...     0 Result Notes  Details    Reading Physician Reading Date Result Priority   Alexander Lawrence MD  [phone] 10/20/2022 Routine     Narrative & Impression  EXAMINATION:  MRI SHOULDER WITHOUT CONTRAST RIGHT     CLINICAL HISTORY:  Shoulder pain, rotator cuff disorder suspected, xray done; Pain in right shoulder     TECHNIQUE:  Right shoulder MRI without IV contrast.     COMPARISON:  Right shoulder radiographs 03/21/2022, right shoulder MRI 07/18/2020     FINDINGS:  Moderate tendinosis diffusely affects the supraspinatus tendon, unchanged.  Focal area of T2 hyperintensity affecting posterior infraspinatus tendon near the greater tuberosity insertion suggests focal low-grade partial-thickness tear affecting 10-20% of articular sided fibers and measuring 4 mm AP dimension, new.  Teres minor and subscapularis tendons are normal.  Rotator cuff muscles show no significant fatty atrophy and maintain normal signal.     Long head of biceps tendon and biceps labral complex are intact.  Negative for labral tear.     Physiologic amount of fluid lies in glenohumeral joint.  No articular cartilage defect identified.     Minor AC joint degenerative changes are unchanged.  Type 2 acromion demonstrates mild lateral downsloping.  Small amount of fluid lies in the subacromial subdeltoid bursa.  The inferior glenohumeral ligaments are unremarkable.     Diffuse low T1 bone marrow signal alteration suggest red marrow recruitment, unchanged.     Impression:     1. Moderate right supraspinatus tendinosis, unchanged.  2. Focal low-grade partial-thickness articular surface tear of posterior right infraspinatus tendon, new.  3. Minor right AC joint degenerative changes, unchanged        Electronically signed by: Alexander Lawrence MD  Date:                                             10/20/2022  Time:                                           18:49       Assessment:     1. Diabetic polyneuropathy associated with type 2 diabetes mellitus    2. Fall, initial encounter    3. Fibromyalgia    4. Lumbosacral pain              Plan:     Yolanda was seen today for fibromyalgia.    Diagnoses and all orders for this visit:    Diabetic polyneuropathy associated with type 2 diabetes mellitus    Fall, initial encounter  -     X-ray Shoulder 2 or More Views Right; Future    Fibromyalgia  -     X-Ray Sacroiliac Joints Complete; Future    Lumbosacral pain  -     X-Ray Sacroiliac Joints Complete; Future        Fibromyalgia with associated fatigue  Prior h/o elevated inflamm markers  Labs today  Did steroid trial after the 9/2022 labs showed crp  Pt forgot to get labs after completion of the steroid  Consider repeating steroid trial  Sent message to DM team  Elevated glucose during said trial  Has f/u w IPM next week  Shoulder pain s/p fall yesterday  Xray today  Ortho f/u if no improvement or it worsens  Back pain  C/w f/u IPM as scheduled  On cymbalta 60 mg qhs - some room to add am dose in future  SI joint xray today, consider MRI  Return to clinic: 6-8 wks    No follow-ups on file.    The patient understands, chooses and consents to this plan and accepts all the risks which include but are not limited to the risks mentioned above.     Disclaimer: This note was prepared using a voice recognition system and is likely to have sound alike errors within the text.

## 2023-01-13 NOTE — Clinical Note
Elizabeth I did a steroid trial for her to see if inflammatory markers improved.  Unfortunately she did not remember to get the labs I asked to have done at completion of treament.  I am considering redoing the trial but she had elevations in glucose during the trial.  I'm guessing she didn't reach out to you.  Regardless, what are your thoughts on me doing another 2-4 week course of prednisone 10mg?  I'm getting labs today and including the A1c.  I'd like to see how it looks after the month of steroids she had previously. Mohini Wyman PA-C [facility]

## 2023-01-17 ENCOUNTER — PATIENT MESSAGE (OUTPATIENT)
Dept: PHARMACY | Facility: CLINIC | Age: 53
End: 2023-01-17
Payer: MEDICAID

## 2023-01-17 ENCOUNTER — OFFICE VISIT (OUTPATIENT)
Dept: PAIN MEDICINE | Facility: CLINIC | Age: 53
End: 2023-01-17
Payer: MEDICAID

## 2023-01-17 VITALS
HEIGHT: 56 IN | HEART RATE: 72 BPM | BODY MASS INDEX: 39.52 KG/M2 | SYSTOLIC BLOOD PRESSURE: 127 MMHG | WEIGHT: 175.69 LBS | DIASTOLIC BLOOD PRESSURE: 84 MMHG

## 2023-01-17 DIAGNOSIS — M79.7 FIBROMYALGIA: ICD-10-CM

## 2023-01-17 DIAGNOSIS — M79.18 MYOFASCIAL MUSCLE PAIN: ICD-10-CM

## 2023-01-17 DIAGNOSIS — M54.50 LUMBOSACRAL PAIN: Primary | ICD-10-CM

## 2023-01-17 DIAGNOSIS — F41.1 GENERALIZED ANXIETY DISORDER: Chronic | ICD-10-CM

## 2023-01-17 DIAGNOSIS — E11.65 TYPE 2 DIABETES MELLITUS WITH HYPERGLYCEMIA, WITH LONG-TERM CURRENT USE OF INSULIN: Primary | ICD-10-CM

## 2023-01-17 DIAGNOSIS — M54.12 CERVICAL RADICULOPATHY: Primary | ICD-10-CM

## 2023-01-17 DIAGNOSIS — M53.3 SCLEROSIS OF SACROILIAC JOINT: ICD-10-CM

## 2023-01-17 DIAGNOSIS — Z79.4 TYPE 2 DIABETES MELLITUS WITH HYPERGLYCEMIA, WITH LONG-TERM CURRENT USE OF INSULIN: Primary | ICD-10-CM

## 2023-01-17 PROCEDURE — 99214 PR OFFICE/OUTPT VISIT, EST, LEVL IV, 30-39 MIN: ICD-10-PCS | Mod: S$PBB,,, | Performed by: PHYSICIAN ASSISTANT

## 2023-01-17 PROCEDURE — 3044F HG A1C LEVEL LT 7.0%: CPT | Mod: CPTII,,, | Performed by: PHYSICIAN ASSISTANT

## 2023-01-17 PROCEDURE — 99999 PR PBB SHADOW E&M-EST. PATIENT-LVL V: CPT | Mod: PBBFAC,,, | Performed by: PHYSICIAN ASSISTANT

## 2023-01-17 PROCEDURE — 4010F ACE/ARB THERAPY RXD/TAKEN: CPT | Mod: CPTII,,, | Performed by: PHYSICIAN ASSISTANT

## 2023-01-17 PROCEDURE — 3074F PR MOST RECENT SYSTOLIC BLOOD PRESSURE < 130 MM HG: ICD-10-PCS | Mod: CPTII,,, | Performed by: PHYSICIAN ASSISTANT

## 2023-01-17 PROCEDURE — 99215 OFFICE O/P EST HI 40 MIN: CPT | Mod: PBBFAC | Performed by: PHYSICIAN ASSISTANT

## 2023-01-17 PROCEDURE — 3079F PR MOST RECENT DIASTOLIC BLOOD PRESSURE 80-89 MM HG: ICD-10-PCS | Mod: CPTII,,, | Performed by: PHYSICIAN ASSISTANT

## 2023-01-17 PROCEDURE — 1160F RVW MEDS BY RX/DR IN RCRD: CPT | Mod: CPTII,,, | Performed by: PHYSICIAN ASSISTANT

## 2023-01-17 PROCEDURE — 3074F SYST BP LT 130 MM HG: CPT | Mod: CPTII,,, | Performed by: PHYSICIAN ASSISTANT

## 2023-01-17 PROCEDURE — 99999 PR PBB SHADOW E&M-EST. PATIENT-LVL V: ICD-10-PCS | Mod: PBBFAC,,, | Performed by: PHYSICIAN ASSISTANT

## 2023-01-17 PROCEDURE — 4010F PR ACE/ARB THEARPY RXD/TAKEN: ICD-10-PCS | Mod: CPTII,,, | Performed by: PHYSICIAN ASSISTANT

## 2023-01-17 PROCEDURE — 3008F BODY MASS INDEX DOCD: CPT | Mod: CPTII,,, | Performed by: PHYSICIAN ASSISTANT

## 2023-01-17 PROCEDURE — 99214 OFFICE O/P EST MOD 30 MIN: CPT | Mod: S$PBB,,, | Performed by: PHYSICIAN ASSISTANT

## 2023-01-17 PROCEDURE — 1159F MED LIST DOCD IN RCRD: CPT | Mod: CPTII,,, | Performed by: PHYSICIAN ASSISTANT

## 2023-01-17 PROCEDURE — 3044F PR MOST RECENT HEMOGLOBIN A1C LEVEL <7.0%: ICD-10-PCS | Mod: CPTII,,, | Performed by: PHYSICIAN ASSISTANT

## 2023-01-17 PROCEDURE — 3008F PR BODY MASS INDEX (BMI) DOCUMENTED: ICD-10-PCS | Mod: CPTII,,, | Performed by: PHYSICIAN ASSISTANT

## 2023-01-17 PROCEDURE — 3072F LOW RISK FOR RETINOPATHY: CPT | Mod: CPTII,,, | Performed by: PHYSICIAN ASSISTANT

## 2023-01-17 PROCEDURE — 3079F DIAST BP 80-89 MM HG: CPT | Mod: CPTII,,, | Performed by: PHYSICIAN ASSISTANT

## 2023-01-17 PROCEDURE — 3072F PR LOW RISK FOR RETINOPATHY: ICD-10-PCS | Mod: CPTII,,, | Performed by: PHYSICIAN ASSISTANT

## 2023-01-17 PROCEDURE — 1160F PR REVIEW ALL MEDS BY PRESCRIBER/CLIN PHARMACIST DOCUMENTED: ICD-10-PCS | Mod: CPTII,,, | Performed by: PHYSICIAN ASSISTANT

## 2023-01-17 PROCEDURE — 1159F PR MEDICATION LIST DOCUMENTED IN MEDICAL RECORD: ICD-10-PCS | Mod: CPTII,,, | Performed by: PHYSICIAN ASSISTANT

## 2023-01-17 RX ORDER — SEMAGLUTIDE 1.34 MG/ML
INJECTION, SOLUTION SUBCUTANEOUS
Qty: 1 PEN | Refills: 5 | Status: SHIPPED | OUTPATIENT
Start: 2023-01-17 | End: 2023-03-08

## 2023-01-17 RX ORDER — DULOXETIN HYDROCHLORIDE 60 MG/1
60 CAPSULE, DELAYED RELEASE ORAL DAILY
Qty: 30 CAPSULE | Refills: 2 | Status: SHIPPED | OUTPATIENT
Start: 2023-01-17 | End: 2023-04-17

## 2023-01-17 RX ORDER — TIZANIDINE 4 MG/1
8 TABLET ORAL EVERY 6 HOURS PRN
Qty: 120 TABLET | Refills: 3 | Status: SHIPPED | OUTPATIENT
Start: 2023-01-17 | End: 2023-02-23 | Stop reason: ALTCHOICE

## 2023-01-17 NOTE — PROGRESS NOTES
Established chronic pain patient visit note (follow-up visit):    Chief Pain Complaint:  No chief complaint on file.    Interval History (1/17/2023):  Yolanda Betts presents today for follow-up visit.  Patient was last seen on 05/09/2022. At that visit, the plan was to discontinue Savella and Flexeril, continue Cymbalta 60 mg once daily, and begin Tizanidine. She reports she had a slip and fall on Sunday in her tub. Denies any dizziness or LOC before fall, just slipped. Able to get up. Sore all over since then. Patient reports pain as 10/10 today. MRI of cervical spine ordered by Dr. Kirkpatrick, who thinks shoulder pain is stemming more from neck. No injections in shoulder. Pain begins in neck and radiates into both arms and down spine. Reports nothing helps her pain. Requesting referral for medicinal marijuana as she heard that this may be beneficial.    MRI cervical spine 01/03/2023  FINDINGS:  The vertebral body heights and alignment are maintained throughout the cervical spine.     No abnormal vertebral body marrow signal.     Multilevel intervertebral disc desiccation.     No abnormal signal at the cervicomedullary junction or cervical spinal cord.     C2-3: Normal intervertebral disc contour. No central canal or neural foraminal narrowing.     C3-4: Normal intervertebral disc contour.  Bilateral uncovertebral and facet hypertrophy.  No central canal or neural foraminal narrowing.     C4-5: Intervertebral disc bulge.  Bilateral uncovertebral and facet hypertrophy.  No central canal narrowing.  Mild bilateral neural foraminal narrowing.     C5-6: Intervertebral disc bulge.  Bilateral uncovertebral and facet hypertrophy.  Minimal bilateral neural foraminal narrowing.  No central canal narrowing.     C6-7: Intervertebral disc bulge.  No central canal or neural foraminal narrowing.     C7-T1: Normal intervertebral disc contour. No central canal or neural foraminal narrowing.     The paravertebral soft tissues  are unremarkable.     Impression:     At C4-5, there is mild intervertebral disc bulge with uncovertebral and facet hypertrophy mildly narrowing the bilateral neural foramina.     At C5-6, there is an intervertebral disc bulge with uncovertebral and facet hypertrophy minimally narrowing the bilateral neural foramina.    Interval HPI 05/09/2022  Yolanda Betts is a 52 y.o. female presents today for tele medicine follow-up.  She was recently seen on 03/07/2022.  At that visit, she was provided with a Medrol Dosepak and advised to continue with topical analgesics along with Savella, Cymbalta, and Flexeril p.r.n..  She reports less than 10% relief with this current medication regimen and is having poor sleep.    Interval History (3/7/2022):    Yolanda Betts presents today for follow-up visit.  Patient was last seen about 6 months ago.  She doesn't feel pain is controlled currently, but she does not feel like she can go up on the Savella because she is concerned about drowsiness.  She continues to take the Flexeril twice a day.  She alternates with Voltaren gel and lidocaine cream, which helps somewhat.  She continues to follow-up with orthopedics and had a right shoulder injection on 02/07/2022 with some pain relief.  Patient reports pain as 10/10 today.    Interval History (9/17/2021):  Yolanda Betts presents today on telemedicine visit.  Patient was last seen on 03/23/2021 with Dr. Hay. At that visit, she was on Cymbalta 60 mg twice a day, which she is now only taking once a day.  She is also taking Savella, and she inquires about an increase.  Patient reports pain as 8/10 today.    History of Present Illness:   This patient is a 50 y.o. female who presents today complaining of the above noted pain/s. The patient describes the pain as follows.  Ms. Betts his new patient clinic with complaints of bilateral shoulders, bilateral hips, thoracic and lumbar spine pain.  She has a history of fibromyalgia  reports that this pain feels different from the fibromyalgia.  She has been diagnosed with fibromyalgiafor approximately 10 years has been having these joint complaints for approximately last 5 years.  She describes a sharp throbbing sensation which is worse with movement and she has been unable find anything that provides pain relief.  She has tried numerous different medications including Aleve, Tylenol, Robaxin, tizanidine, Advil, gabapentin, Flexeril, Lyrica, Cymbalta, baclofen in addition to currently participating in physical therapy.  She has had shoulder joint injections in the past which helped for short period of time but denies having had hip injections.  She denies having had surgery on any of these joints.  She reports that anti-inflammatory medications cause her blood pressure to increase therefore she has to avoid them.    Yolanda Betts is a 52 y.o. female  who is presenting with a chief complaint of lumbar back pain . The patient began experiencing this problem insidiously, and the pain has been gradually worsening over the past 5 year(s). The pain is described as throbbing, cramping, aching and heavy and is located in the bilateral lumbar spine . Pain is intermittent and lasts hours. The  pain is nonradiating. The patient rates her pain a 7 out of ten and interferes with activities of daily living a 6 out of ten. Pain is exacerbated by flexion/ extension of the lumbar spine, and is improved by rest. Patient reports prior trauma, no prior spinal surgery     - pertinent negatives: No fever, No chills, No weight loss, No bladder dysfunction, No bowel dysfunction, No saddle anesthesia  - pertinent positives: none    - medications, other therapies tried (physical therapy, injections):     >> NSAIDs, Tylenol, Tramadol, gabapentin, lyrica, zanaflex and flexeril    >> Has previously undergone Physical Therapy    >> Has NOT previously undergone spinal injection/s      Imaging / Labs / Studies (reviewed  on 1/17/2023):    11/13/2020 X-Ray Lumbar Spine Ap And Lateral  COMPARISON:  Lumbar spine radiographs August 18, 2020    FINDINGS:  Alignment of the lumbar spine is normal without listhesis.  No fracture.  No definite pars defect.  No suspicious osseous lesion.  Intervertebral disc spaces of the lumbar spine are maintained.  Inferior lumbar spine facet arthropathy is noted.  Sacroiliac joints are unremarkable.  Multiple phleboliths are present within the pelvis.      1/14/21 X-Ray Cervical Spine Complete 5 view  COMPARISON:  August 18, 2020    FINDINGS:  Visualization of C7-T1 level on the lateral view.  Vertebral height and alignment maintained with straightening of the normal curvature.  This can be seen with positioning as well as spasm and/or strain.  Prevertebral soft tissues, C1-2 articulation and odontoid normal in appearance allowing for positioning.  Pre dens space normal.  Neural foramina at widely patent at all levels bilaterally.    Remaining included osseous structures intact.  Visualized upper lung fields clear.    Impression  Straightening of normal C-spine curvature.  This can be seen with positioning as well as spasm and/or strain.    No acute fracture or subluxation.  Follow-up and or further evaluation as warranted.        Review of Systems:  CONSTITUTIONAL: patient denies any fever, chills, or weight loss  SKIN: patient denies any rash or itching  RESPIRATORY: patient denies having any shortness of breath  GASTROINTESTINAL: patient denies having any diarrhea, constipation, or bowel incontinence  GENITOURINARY: patient denies having any abnormal bladder function    MUSCULOSKELETAL:  - patient complains of the above noted pain/s (see chief pain complaint)    NEUROLOGICAL:   - pain as above  - strength in Lower extremities is intact, BILATERALLY  - sensation in Lower extremities is intact, BILATERALLY  - patient denies any loss of bowel or bladder control      PSYCHIATRIC: patient denies any change  in mood    Other:  All other systems reviewed and are negative      Physical Exam:    General: Alert and oriented, in no apparent distress.  Gait: normal gait.  Skin: No rashes, No discoloration, No obvious lesions  HEENT: Normocephalic, atraumatic. Pupils equal and round.  Cardiovascular: Regular rate and rhythm , no significant peripheral edema present  Respiratory: Without audible wheezing, without use of accessory muscles of respiration.    Musculoskeletal:    Cervical Spine    - Pain on flexion of cervical spine Present  - Spurling's Test:  equivocal bilaterally    - Pain on extension of cervical spine Present  - TTP over the cervical facet joints Present  - Cervical facet loading Present    -TTP over trapezius bilaterally with allodynia, grabbed practitioners hands during exam  -TTP over rhomboid bilaterally with allodynia      Lumbar Spine    - Pain on flexion of lumbar spine Absent  - Straight Leg Raise:  Absent    - Pain on extension of lumbar spine Present  - TTP over the lumbar facet joints Present  - Lumbar facet loading Present    -Pain on palpation over the SI joint  Present  - LONG: Absent      Neuro:    Strength:  UE R/L: D: 5/5; B: 5/5; T: 5/5; WF: 5/5; WE: 5/5; IO: 5/5;  LE R/L: HF: 5/5, HE: 5/5, KF: 5/5; KE: 5/5; FE: 5/5; FF: 5/5    Extremity Reflexes: Brisk and symmetric throughout.      Extremity Sensory: Sensation to pinprick and temperature symmetric. Proprioception intact.      Psych:  Mood and affect is appropriate      Assessment:  Yolanda Betts is a 52 y.o. year old female who is presenting with     No diagnosis found.    Plan:  - Interventional: Schedule for C6/7 [place] ROCAEL for cervical pain and radiculopathy    - Pharmacologic:    -Continue  Cymbalta 60 mg once daily  -Increase tizanidine from 4 mg to 8 mg every 8 hours  - continue diclofenac 1% gel to apply 2g topically up to 4 times per day PRN.   - continue lidocaine 2.5%-prilocaine 2.5% topical cream Q6H PRN topically; can  alternate with Voltaren gel, which helps some.  - Patient has failed Gabapentin, Lyrica.  No NSAIDs due to reports of worsening hypertension.  -Referral sent to Dr. Pelon Drew for Medicinal Marijuana      - Rehabilitative: Encouraged regular exercise. Continue exercises and activities as tolerated.     - Diagnostic: Cervical, Thoracic, Lumbar Xray previously reviewed.     - Follow up: 4 weeks after injection    - This condition does not require this patient to take time off of work, and the primary goal of our Pain Management services is to improve the patient's functional capacity.     - I discussed the risks, benefits, and alternatives to potential treatment options. All questions and concerns were fully addressed today in clinic.     Bernice Womack PA-C  Interventional Pain Medicine  [facility]      Disclaimer:  This note was prepared using voice recognition system and is likely to have sound alike errors that may have been overlooked even after proof reading.  Please call me with any questions.

## 2023-01-18 ENCOUNTER — TELEPHONE (OUTPATIENT)
Dept: DIABETES | Facility: CLINIC | Age: 53
End: 2023-01-18
Payer: MEDICAID

## 2023-01-18 NOTE — TELEPHONE ENCOUNTER
Pt called with instructions below.  No concerns or questions     ----- Message from Elizabeth Ceron NP sent at 1/17/2023  1:33 PM CST -----  Contact: self/[phone]  1. Ozempic sent to pharmacy. Inject 0.25 mg once weekly for 4 weeks, then increase to 0.5 mg once weekly. This is a stronger version on the Bydureon she was on in the past. Similar side effects and warning  2. Lower [place] by 4 units  3. Add an additional follow up with me in ~ 6 weeks  ----- Message -----  From: Ariela Wheeler MA  Sent: 1/17/2023  10:39 AM CST  To: Elizabeth Ceron NP    Pt want's to know if you can put her on Ozempic.  ----- Message -----  From: Yudelka Moreau  Sent: 1/17/2023   9:35 AM CST  To: [facility] Staff    Pt is calling in regards to a new medication. She states she wants to know if medication can be sent in without her being seen or does she need an appointment. Please give her a call back at [phone]. Thank you s/g

## 2023-01-23 ENCOUNTER — PATIENT MESSAGE (OUTPATIENT)
Dept: RHEUMATOLOGY | Facility: CLINIC | Age: 53
End: 2023-01-23
Payer: MEDICAID

## 2023-01-24 ENCOUNTER — TELEPHONE (OUTPATIENT)
Dept: PAIN MEDICINE | Facility: CLINIC | Age: 53
End: 2023-01-24
Payer: MEDICAID

## 2023-01-24 NOTE — PRE-PROCEDURE INSTRUCTIONS
Attempted to PAT patient for procedure on 1.31 with Dr. Phillips. No answer. M with return phone number. No return call at this time.

## 2023-01-24 NOTE — TELEPHONE ENCOUNTER
Attempted to call pt in regards to referral to Dr. Carpenter. Your insurance is not accepted with his clinic. You can either call your insurance and see if there is anyone in network or see if you can do cash pay if that is an option   LVM to call back at earliest convenience.

## 2023-01-25 NOTE — PRE-PROCEDURE INSTRUCTIONS
Spoke with patient regarding procedure scheduled on 1.31    Arrival time 0715    Has patient been sick with fever or on antibiotics within the last 7 days? No    Does the patient have any open wounds, sores or rashes? No    Does the patient have any recent fractures? no    Has patient received a vaccination within the last 7 days? No    Received the COVID vaccination? yes    Has the patient stopped all medications as directed? Hold asa 5 days prior to procedure. Cardiac clearance obtained from dr brizuela on 1.24. hold dm meds am of procedure     Does patient have a pacemaker and or defibrillator? no    Does the patient have a ride to and from procedure and someone reliable to remain with patient? daughter    Is the patient diabetic? yes    Does the patient have sleep apnea? Or use O2 at home? Beau on cpap o2 hs    Is the patient receiving sedation? yes    Is the patient instructed to remain NPO beginning at midnight the night before their procedure? yes    Procedure location confirmed with patient? Yes    Covid- Denies signs/symptoms. Instructed to notify PAT/MD if any changes.

## 2023-01-26 ENCOUNTER — OFFICE VISIT (OUTPATIENT)
Dept: GASTROENTEROLOGY | Facility: CLINIC | Age: 53
End: 2023-01-26
Payer: MEDICAID

## 2023-01-26 ENCOUNTER — TELEPHONE (OUTPATIENT)
Dept: PRIMARY CARE CLINIC | Facility: CLINIC | Age: 53
End: 2023-01-26
Payer: MEDICAID

## 2023-01-26 ENCOUNTER — TELEPHONE (OUTPATIENT)
Dept: GASTROENTEROLOGY | Facility: CLINIC | Age: 53
End: 2023-01-26
Payer: MEDICAID

## 2023-01-26 VITALS
HEIGHT: 56 IN | WEIGHT: 186.31 LBS | DIASTOLIC BLOOD PRESSURE: 62 MMHG | SYSTOLIC BLOOD PRESSURE: 114 MMHG | BODY MASS INDEX: 41.91 KG/M2 | HEART RATE: 93 BPM

## 2023-01-26 DIAGNOSIS — Z86.010 HISTORY OF COLON POLYPS: ICD-10-CM

## 2023-01-26 DIAGNOSIS — K21.9 GASTROESOPHAGEAL REFLUX DISEASE, UNSPECIFIED WHETHER ESOPHAGITIS PRESENT: ICD-10-CM

## 2023-01-26 DIAGNOSIS — R13.19 ESOPHAGEAL DYSPHAGIA: ICD-10-CM

## 2023-01-26 DIAGNOSIS — K59.04 CHRONIC IDIOPATHIC CONSTIPATION: Primary | ICD-10-CM

## 2023-01-26 PROCEDURE — 3078F PR MOST RECENT DIASTOLIC BLOOD PRESSURE < 80 MM HG: ICD-10-PCS | Mod: CPTII,,, | Performed by: NURSE PRACTITIONER

## 2023-01-26 PROCEDURE — 3072F PR LOW RISK FOR RETINOPATHY: ICD-10-PCS | Mod: CPTII,,, | Performed by: NURSE PRACTITIONER

## 2023-01-26 PROCEDURE — 99214 OFFICE O/P EST MOD 30 MIN: CPT | Mod: S$PBB,,, | Performed by: NURSE PRACTITIONER

## 2023-01-26 PROCEDURE — 3008F PR BODY MASS INDEX (BMI) DOCUMENTED: ICD-10-PCS | Mod: CPTII,,, | Performed by: NURSE PRACTITIONER

## 2023-01-26 PROCEDURE — 3008F BODY MASS INDEX DOCD: CPT | Mod: CPTII,,, | Performed by: NURSE PRACTITIONER

## 2023-01-26 PROCEDURE — 4010F ACE/ARB THERAPY RXD/TAKEN: CPT | Mod: CPTII,,, | Performed by: NURSE PRACTITIONER

## 2023-01-26 PROCEDURE — 4010F PR ACE/ARB THEARPY RXD/TAKEN: ICD-10-PCS | Mod: CPTII,,, | Performed by: NURSE PRACTITIONER

## 2023-01-26 PROCEDURE — 99214 PR OFFICE/OUTPT VISIT, EST, LEVL IV, 30-39 MIN: ICD-10-PCS | Mod: S$PBB,,, | Performed by: NURSE PRACTITIONER

## 2023-01-26 PROCEDURE — 1159F PR MEDICATION LIST DOCUMENTED IN MEDICAL RECORD: ICD-10-PCS | Mod: CPTII,,, | Performed by: NURSE PRACTITIONER

## 2023-01-26 PROCEDURE — 3044F HG A1C LEVEL LT 7.0%: CPT | Mod: CPTII,,, | Performed by: NURSE PRACTITIONER

## 2023-01-26 PROCEDURE — 3044F PR MOST RECENT HEMOGLOBIN A1C LEVEL <7.0%: ICD-10-PCS | Mod: CPTII,,, | Performed by: NURSE PRACTITIONER

## 2023-01-26 PROCEDURE — 99999 PR PBB SHADOW E&M-EST. PATIENT-LVL V: ICD-10-PCS | Mod: PBBFAC,,, | Performed by: NURSE PRACTITIONER

## 2023-01-26 PROCEDURE — 99999 PR PBB SHADOW E&M-EST. PATIENT-LVL V: CPT | Mod: PBBFAC,,, | Performed by: NURSE PRACTITIONER

## 2023-01-26 PROCEDURE — 99215 OFFICE O/P EST HI 40 MIN: CPT | Mod: PBBFAC | Performed by: NURSE PRACTITIONER

## 2023-01-26 PROCEDURE — 3072F LOW RISK FOR RETINOPATHY: CPT | Mod: CPTII,,, | Performed by: NURSE PRACTITIONER

## 2023-01-26 PROCEDURE — 1159F MED LIST DOCD IN RCRD: CPT | Mod: CPTII,,, | Performed by: NURSE PRACTITIONER

## 2023-01-26 PROCEDURE — 1160F PR REVIEW ALL MEDS BY PRESCRIBER/CLIN PHARMACIST DOCUMENTED: ICD-10-PCS | Mod: CPTII,,, | Performed by: NURSE PRACTITIONER

## 2023-01-26 PROCEDURE — 1160F RVW MEDS BY RX/DR IN RCRD: CPT | Mod: CPTII,,, | Performed by: NURSE PRACTITIONER

## 2023-01-26 PROCEDURE — 3078F DIAST BP <80 MM HG: CPT | Mod: CPTII,,, | Performed by: NURSE PRACTITIONER

## 2023-01-26 PROCEDURE — 3074F SYST BP LT 130 MM HG: CPT | Mod: CPTII,,, | Performed by: NURSE PRACTITIONER

## 2023-01-26 PROCEDURE — 3074F PR MOST RECENT SYSTOLIC BLOOD PRESSURE < 130 MM HG: ICD-10-PCS | Mod: CPTII,,, | Performed by: NURSE PRACTITIONER

## 2023-01-26 RX ORDER — MIRTAZAPINE 7.5 MG/1
7.5 TABLET, FILM COATED ORAL NIGHTLY
COMMUNITY
Start: 2023-01-23 | End: 2023-06-13

## 2023-01-26 RX ORDER — OMEPRAZOLE 40 MG/1
40 CAPSULE, DELAYED RELEASE ORAL DAILY
Qty: 30 CAPSULE | Refills: 11 | Status: SHIPPED | OUTPATIENT
Start: 2023-01-26 | End: 2023-02-01 | Stop reason: SDUPTHER

## 2023-01-26 RX ORDER — SODIUM, POTASSIUM,MAG SULFATES 17.5-3.13G
SOLUTION, RECONSTITUTED, ORAL ORAL
Qty: 354 ML | Refills: 0 | Status: SHIPPED | OUTPATIENT
Start: 2023-01-26 | End: 2023-03-22

## 2023-01-26 RX ORDER — PRUCALOPRIDE 2 MG/1
2 TABLET, FILM COATED ORAL DAILY
Qty: 30 TABLET | Refills: 11 | Status: SHIPPED | OUTPATIENT
Start: 2023-01-26 | End: 2024-02-21

## 2023-01-26 NOTE — PATIENT INSTRUCTIONS
Extended Bowl Prep   You will start your bowel prep 2 days before colonoscopy instead of 1 day before.   2 days before colonoscopy you will begin your clear liquid diet. You will drink one bottle of magnesium citrate that evening.  Magnesium citrate can be purchased over the counter at your local drug store/pharmacy.   1 day before colonoscopy, you will continue the clear liquid diet and take the regular prescribed bowel prep as directed.      If unable to find Magnesium Citrate, you will substitute with Milk of Magnesia. Dose is 4 tablespoons (60 ml) x a single dose.

## 2023-01-26 NOTE — TELEPHONE ENCOUNTER
Patient called regarding appointment rescheduling. Patient appointment was rescheduled and added to waiting list. Patient informed I will speak to provider regarding sooner appointment. Patient voiced understanding.  ----- Message from Manisha Roque sent at 1/26/2023 10:27 AM CST -----  Contact: self/[phone]  Patient is calling to reschedule her appt, please call her back at . Thanks/ar

## 2023-01-26 NOTE — TELEPHONE ENCOUNTER
----- Message from Manisha Roque sent at 1/26/2023 10:25 AM CST -----  Contact: self  Patient call to states she will be 15 min late due to traffic. Thanks.

## 2023-01-26 NOTE — PROGRESS NOTES
Clinic Follow Up:  [facility] Follow Up Note    Reason for Follow Up:  The primary encounter diagnosis was Chronic idiopathic constipation. Diagnoses of Gastroesophageal reflux disease, unspecified whether esophagitis present, History of colon polyps, and Esophageal dysphagia were also pertinent to this visit.    PCP: Sasha Sorenson       HPI:  This is a 52 y.o. female here for follow up of the above.   She is a patient of Dr. Mata. She is new to me.     # Chronic constipation  - Chronic issue  - she is currently on Trulance. She reports it was working but is no longer.   - she has bowel movements 1-2 times per week.   - failed therapy with Lactulose, Linzess, Amitiza, Miralax, Colace, and enemas/suppositories.     # GERD  - has chronic GERD s/p gastric sleeve surgery (6/1/2021)   She has been on omeprazole since that time. She was doing well on omeprazole 20 mg BID. This medication was discontinued 1 year later.  She had a return of GERD symptoms 6 months ago and has worsening since then.   Symptoms: regurgitation and burning chest pain   Nausea or vomiting: no  Dysphagia: yes-- Location: throat and upper chest area-- Substances with issues: both solids and liquids  NSAID use: no  Smoker: no  Family history of UGI cancer: no  Prior workup: EGD in the past    Review of Systems   Constitutional:  Negative for activity change and appetite change.        As per interval history above   Respiratory:  Negative for cough and shortness of breath.    Cardiovascular:  Negative for chest pain.   Gastrointestinal:  Positive for abdominal pain and constipation. Negative for blood in stool, diarrhea, nausea, rectal pain and vomiting.        Heartburn, dysphagia    Skin:  Negative for color change and rash.     Medical History:  Past Medical History:   Diagnosis Date    Abnormal Pap smear of cervix     HPV genital warts    Anemia     Anxiety     Arthritis     Asthma 10/11/2016    Bipolar 1 disorder      Diabetes mellitus, type 2     Dyslipidemia associated with type 2 diabetes mellitus 2019    General anesthetics causing adverse effect in therapeutic use     Genital warts     GERD (gastroesophageal reflux disease)     Herpes simplex virus (HSV) infection     Hyperlipidemia     Hypertension     Hypertension complicating diabetes 2019    Migraine with aura and without status migrainosus, not intractable 3/21/2016    Mild persistent asthma without complication 10/11/2016    Morbid obesity with body mass index (BMI) of 45.0 to 49.9 in adult 8/3/2017    Obstructive sleep apnea     ALEN (obstructive sleep apnea)     2L per N/C q HS    Schizoaffective disorder, bipolar type 2019    Seasonal allergic rhinitis due to pollen 2019    Type 2 diabetes mellitus with hyperglycemia, with long-term current use of insulin 2017    Type 2 diabetes mellitus with hyperglycemia, without long-term current use of insulin 2017       Surgical History:   Past Surgical History:   Procedure Laterality Date    abcess removed right back      BELT ABDOMINOPLASTY      BREAST SURGERY Bilateral 1996    Reduction     SECTION      X 2    COLONOSCOPY      COLONOSCOPY N/A 2018    Procedure: colonoscopy for iron deficiency anemia;  Surgeon: Frankie Sanchez MD;  Location: [facility] ENDO;  Service: Endoscopy;  Laterality: N/A;    COLONOSCOPY N/A 2018    Procedure: COLONOSCOPY;  Surgeon: Frankie Sanchez MD;  Location: [facility] ENDO;  Service: Endoscopy;  Laterality: N/A;    HYSTERECTOMY      w/ BSO; hypermenorrhea    MOUTH SURGERY      OOPHORECTOMY      hyst/bso, hypermenorrhea    ROBOT-ASSISTED CHOLECYSTECTOMY USING DA DARI XI N/A 1/3/2020    Procedure: XI ROBOTIC CHOLECYSTECTOMY;  Surgeon: Damir Driscoll MD;  Location: [facility] OR;  Service: General;  Laterality: N/A;    TOTAL REDUCTION MAMMOPLASTY      TUBAL LIGATION         Family History:   Family History   Problem Relation Age of Onset     "Diabetes Mother     Hyperlipidemia Mother     Hypertension Mother     Asthma Mother     COPD Mother     Glaucoma Mother     Thyroid disease Mother     Anesthesia problems Mother         "almost had a cardiac arrest" , blood clots    Hypertension Father     Hyperlipidemia Father     Cancer Father         Brain, lung, liver, kidney    Heart disease Maternal Grandmother     Hyperlipidemia Maternal Grandmother     Hypertension Maternal Grandmother     Cataracts Maternal Grandmother     Diabetes Maternal Grandmother     Heart disease Maternal Grandfather     Hyperlipidemia Maternal Grandfather     Hypertension Maternal Grandfather     Glaucoma Maternal Grandfather     Cancer Maternal Grandfather     Cataracts Maternal Grandfather     Macular degeneration Maternal Grandfather     Diabetes Maternal Grandfather     Heart disease Paternal Grandmother     Hyperlipidemia Paternal Grandmother     Hypertension Paternal Grandmother     Cataracts Paternal Grandmother     Heart disease Paternal Grandfather     Hyperlipidemia Paternal Grandfather     Hypertension Paternal Grandfather     Cataracts Paternal Grandfather     Breast cancer Maternal Cousin     Breast cancer Maternal Cousin     Breast cancer Maternal Cousin     Breast cancer Maternal Cousin        Social History:   Social History     Tobacco Use    Smoking status: Never    Smokeless tobacco: Never   Substance Use Topics    Alcohol use: Yes     Alcohol/week: 0.0 standard drinks     Comment: socially  No alcohol 72h prior to sx    Drug use: No       Allergies:   Review of patient's allergies indicates:   Allergen Reactions    Codeine Itching    Hydromorphone Other (See Comments)     Can't wake up for long time if taken  Slow to wake up after surgery after receiving    Aleve [naproxen sodium]     Lyrica [pregabalin] Itching    Motrin [ibuprofen]     Neuromuscular blockers, steroidal Hives     some    Latex, natural rubber Rash    Morphine Rash     itching    Norco " "[hydrocodone-acetaminophen] Itching, Rash and Hallucinations    Seconal [secobarbital sodium] Rash     itching    Tylox [oxycodone-acetaminophen] Rash       Home Medications:  Current Outpatient Medications on File Prior to Visit   Medication Sig Dispense Refill    albuterol (PROVENTIL/VENTOLIN HFA) 90 mcg/actuation inhaler Inhale 2 puffs into the lungs every 6 hours as needed for wheezing 8.5 g 3    amlodipine-valsartan (EXFORGE)  mg per tablet Take 1 tablet by mouth once daily. 90 tablet 3    aspirin (ECOTRIN) 81 MG EC tablet Take 81 mg by mouth once daily.      atorvastatin (LIPITOR) 20 MG tablet Take 1 tablet (20 mg total) by mouth every evening. 90 tablet 3    BD INSULIN SYRINGE ULTRA-FINE 1/2 mL 30 gauge x 1/2" Syrg   0    blood sugar diagnostic (ONETOUCH ULTRA TEST) Strp Check blood glucose 4 times daily as directed and as needed (dispense insurance preferred brand or patient choice) 200 each 5    blood sugar diagnostic Strp 1 each by Misc.(Non-Drug; Combo Route) route 4 (four) times daily. [facility] Glucose Test Strips 400 strip 3    blood-glucose meter Misc Use as directed (Patient taking differently: Use as directed) 1 each 0    budesonide-formoterol 160-4.5 mcg (SYMBICORT) 160-4.5 mcg/actuation HFAA Inhale 2 puffs into the lungs every 12 (twelve) hours. Controller : Use spacer 10.2 g 6    butalbital-acetaminophen-caffeine -40 mg (FIORICET, ESGIC) -40 mg per tablet Take by mouth.      cycloSPORINE 0.05 % Drop Place 1 drop into both eyes 2 (two) times daily. 5.5 mL 11    DULoxetine (CYMBALTA) 60 MG capsule Take 1 capsule (60 mg total) by mouth once daily. 30 capsule 2    ergocalciferol (ERGOCALCIFEROL) 50,000 unit Cap Take 1 capsule twice weekly for 3 weeks then weekly 12 capsule 3    fenofibrate (TRICOR) 145 MG tablet Take 1 tablet (145 mg total) by mouth once daily. 90 tablet 3    flash glucose scanning reader ([employer] AMY 2 READER) Misc Use as directed to check blood sugar 1 each 1    " fluticasone propionate (FLONASE) 50 mcg/actuation nasal spray 2 sprays (100 mcg total) by Each Nostril route once daily. 16 g 0    frovatriptan (FROVA) 2.5 MG tablet Take 1 tablet at onset of acute migraine. May take 1 more tablet 2 hours later if needed. (MAX 2 TABLET PER DAY. MAX 4 TABLETS PER WEEK.) 9 tablet 1    furosemide (LASIX) 20 MG tablet Take 1 tablet (20 mg total) by mouth once daily. 90 tablet 3    HEALTHYLAX 17 gram PwPk Take 17 g by mouth 2 (two) times daily.      insulin aspart U-100 (NOVOLOG FLEXPEN U-100 INSULIN) 100 unit/mL (3 mL) InPn pen Inject 10 Units into the skin 3 (three) times daily before meals. Use based on sliding scale 15 mL 3    insulin glargine U-300 conc (TOUJEO MAX U-300 SOLOSTAR) 300 unit/mL (3 mL) insulin pen Inject 92 Units into the skin once daily. 4 pen 5    [START ON 2/1/2023] lamoTRIgine (LAMICTAL) 100 MG tablet Take 1 tablet (100 mg total) by mouth once daily. 30 tablet 1    lancets (ONETOUCH DELICA PLUS LANCET) 30 gauge Misc Use as directed 3 times daily 100 each 11    levocetirizine (XYZAL) 5 MG tablet Take 1 tablet (5 mg total) by mouth every evening. 30 tablet 11    LIDOcaine-prilocaine (EMLA) cream Apply topically up to 4 times per day to affected area for pain relief 60 g 0    lurasidone (LATUDA) 60 mg Tab tablet Take 1 tablet by mouth daily with 350 calories 30 tablet 2    magnesium oxide (MAG-OX) 400 mg (241.3 mg magnesium) tablet Take 1 tablet (400 mg total) by mouth once daily. 90 tablet 3    metFORMIN (GLUCOPHAGE) 1000 MG tablet Take 1 tablet (1,000 mg total) by mouth 2 (two) times daily with meals. 180 tablet 0    metoprolol succinate (TOPROL-XL) 50 MG 24 hr tablet Take 1 tablet (50 mg total) by mouth every evening. 90 tablet 3    mirtazapine (REMERON) 7.5 MG Tab Take 7.5 mg by mouth every evening.      naltrexone capsule Take 1 capsule (4.5 mg total) by mouth every evening. 90 capsule 2    naproxen (NAPROSYN) 500 MG tablet Take 1 tablet (500 mg total) by mouth 2  "(two) times daily. 60 tablet 1    pen needle, diabetic (BD ULTRA-FINE MINI PEN NEEDLE) 31 gauge x 3/16" Ndle Use 5 a day 200 each 3    pen needle, diabetic (BD ULTRA-FINE MINI PEN NEEDLE) 31 gauge x 3/16" Ndle Use one needle 5 times a day 200 each 11    pneumoc 20-tatiana conj-dip cr,PF, (PREVNAR-20, PF,) 0.5 mL Syrg injection Inject 0.5 mLs into the muscle. 0.5 mL 0    promethazine (PHENERGAN) 12.5 MG Tab       semaglutide (OZEMPIC) 0.25 mg or 0.5 mg(2 mg/1.5 mL) pen injector Inject 0.25 mg once weekly for 4 weeks, then increase to 0.5 mg once weekly 1 pen 5    spironolactone (ALDACTONE) 25 MG tablet Take 1 tablet (25 mg total) by mouth once daily. 90 tablet 3    tiZANidine (ZANAFLEX) 4 MG tablet Take 2 tablets (8 mg total) by mouth every 6 (six) hours as needed (pain). 120 tablet 3    [DISCONTINUED] plecanatide (TRULANCE) 3 mg Tab Take 3 mg by mouth once daily. 90 tablet 0    ALPRAZolam (XANAX) 0.25 MG tablet Take 1 tablet (0.25 mg total) by mouth As instructed for Anxiety (take one tablet 60 minutes before MRI, if still feeling anxious take second tablet just before MRI). 2 tablet 0    clonazePAM (KLONOPIN) 1 MG tablet Take 1 tablet (1 mg total) by mouth once daily. 30 tablet 2    diclofenac sodium (VOLTAREN) 1 % Gel Apply 2 grams topically 4 (four) times daily as needed. (Patient not taking: Reported on 1/17/2023) 5 each 0    lamoTRIgine (LAMICTAL) 25 MG tablet Take 1 tablet (25 mg total) by mouth every morning for 14 days, THEN 2 tablets (50 mg total) every morning for 14 days. (Patient not taking: Reported on 1/26/2023) 42 tablet 0    [DISCONTINUED] glucagon, human recombinant, (GLUCAGON EMERGENCY KIT, HUMAN,) 1 mg SolR Inject 1 mg into the muscle as needed. Emergency use 1 each 1    [DISCONTINUED] omeprazole (PRILOSEC) 20 MG capsule Take 1 capsule (20 mg total) by mouth 2 (two) times daily before meals. (Patient not taking: Reported on 1/17/2023) 180 capsule 0    [DISCONTINUED] valsartan (DIOVAN) 320 MG tablet " "Take 1 tablet (320 mg total) by mouth once daily. 90 tablet 3     No current facility-administered medications on file prior to visit.       /62 (BP Location: Left arm, Patient Position: Sitting, BP Method: Medium (Manual))   Pulse 93   Ht 4' 8" (1.422 m)   Wt 84.5 kg (186 lb 4.6 oz)   BMI 41.77 kg/m²   Body mass index is 41.77 kg/m².  Physical Exam  Constitutional:       General: She is not in acute distress.  HENT:      Head: Normocephalic and atraumatic.   Eyes:      General: No scleral icterus.     Conjunctiva/sclera: Conjunctivae normal.   Cardiovascular:      Rate and Rhythm: Normal rate and regular rhythm.      Heart sounds: No murmur heard.  Pulmonary:      Effort: Pulmonary effort is normal. No respiratory distress.      Breath sounds: Normal breath sounds. No wheezing.   Abdominal:      General: Abdomen is flat. Bowel sounds are normal.      Palpations: Abdomen is soft.      Tenderness: There is no abdominal tenderness.   Skin:     General: Skin is warm and dry.   Neurological:      General: No focal deficit present.      Mental Status: She is alert and oriented to person, place, and time.      Cranial Nerves: No cranial nerve deficit.   Psychiatric:         Mood and Affect: Mood normal.         Judgment: Judgment normal.       Labs: Pertinent labs reviewed.    Assessment:   1. Chronic idiopathic constipation    2. Gastroesophageal reflux disease, unspecified whether esophagitis present    3. History of colon polyps    4. Esophageal dysphagia      Recommendations:   - Motegrity   - omeprazole   - EGD and colonoscopy     Chronic idiopathic constipation  -     prucalopride (MOTEGRITY) 2 mg Tab; Take 2 mg by mouth once daily.  Dispense: 30 tablet; Refill: 11    Gastroesophageal reflux disease, unspecified whether esophagitis present  -     omeprazole (PRILOSEC) 40 MG capsule; Take 1 capsule (40 mg total) by mouth once daily.  Dispense: 30 capsule; Refill: 11  -     Case Request Endoscopy: EGD " (ESOPHAGOGASTRODUODENOSCOPY), COLONOSCOPY  -     Ambulatory referral/consult to Endo Procedure ; Future; Expected date: 01/27/2023  -     SUPREP BOWEL PREP KIT 17.5-3.13-1.6 gram SolR; Use as directed  Dispense: 354 mL; Refill: 0    History of colon polyps  -     Case Request Endoscopy: EGD (ESOPHAGOGASTRODUODENOSCOPY), COLONOSCOPY  -     Ambulatory referral/consult to Endo Procedure ; Future; Expected date: 01/27/2023  -     SUPREP BOWEL PREP KIT 17.5-3.13-1.6 gram SolR; Use as directed  Dispense: 354 mL; Refill: 0    Esophageal dysphagia  -     Case Request Endoscopy: EGD (ESOPHAGOGASTRODUODENOSCOPY), COLONOSCOPY  -     Ambulatory referral/consult to Endo Procedure ; Future; Expected date: 01/27/2023  -     SUPREP BOWEL PREP KIT 17.5-3.13-1.6 gram SolR; Use as directed  Dispense: 354 mL; Refill: 0      Return to Clinic:  Follow up to be determined after results/ procedure(s).    Thank you for the opportunity to participate in the care of this patient.  KELI Chavez

## 2023-01-31 ENCOUNTER — HOSPITAL ENCOUNTER (OUTPATIENT)
Facility: HOSPITAL | Age: 53
Discharge: HOME | End: 2023-01-31
Attending: PHYSICAL MEDICINE & REHABILITATION | Admitting: PHYSICAL MEDICINE & REHABILITATION
Payer: MEDICAID

## 2023-01-31 VITALS
HEART RATE: 71 BPM | BODY MASS INDEX: 40.72 KG/M2 | RESPIRATION RATE: 16 BRPM | SYSTOLIC BLOOD PRESSURE: 115 MMHG | DIASTOLIC BLOOD PRESSURE: 70 MMHG | TEMPERATURE: 98 F | WEIGHT: 181 LBS | HEIGHT: 56 IN | OXYGEN SATURATION: 95 %

## 2023-01-31 DIAGNOSIS — M54.12 CERVICAL RADICULOPATHY: Primary | ICD-10-CM

## 2023-01-31 LAB — POCT GLUCOSE: 121 MG/DL (ref 70–110)

## 2023-01-31 PROCEDURE — 62321 NJX INTERLAMINAR CRV/THRC: CPT | Performed by: PHYSICAL MEDICINE & REHABILITATION

## 2023-01-31 PROCEDURE — 62321 PR INJ CERV/THORAC, W/GUIDANCE: ICD-10-PCS | Mod: ,,, | Performed by: PHYSICAL MEDICINE & REHABILITATION

## 2023-01-31 PROCEDURE — 25500020 PHARM REV CODE 255: Performed by: PHYSICAL MEDICINE & REHABILITATION

## 2023-01-31 PROCEDURE — 62321 NJX INTERLAMINAR CRV/THRC: CPT | Mod: ,,, | Performed by: PHYSICAL MEDICINE & REHABILITATION

## 2023-01-31 PROCEDURE — 82962 GLUCOSE BLOOD TEST: CPT | Performed by: PHYSICAL MEDICINE & REHABILITATION

## 2023-01-31 PROCEDURE — 63600175 PHARM REV CODE 636 W HCPCS: Performed by: PHYSICAL MEDICINE & REHABILITATION

## 2023-01-31 PROCEDURE — 25000003 PHARM REV CODE 250: Performed by: PHYSICAL MEDICINE & REHABILITATION

## 2023-01-31 RX ORDER — BUPIVACAINE HYDROCHLORIDE 2.5 MG/ML
INJECTION, SOLUTION EPIDURAL; INFILTRATION; INTRACAUDAL
Status: DISCONTINUED | OUTPATIENT
Start: 2023-01-31 | End: 2023-01-31 | Stop reason: HOSPADM

## 2023-01-31 RX ORDER — ONDANSETRON 2 MG/ML
4 INJECTION INTRAMUSCULAR; INTRAVENOUS ONCE AS NEEDED
Status: DISCONTINUED | OUTPATIENT
Start: 2023-01-31 | End: 2023-01-31 | Stop reason: HOSPADM

## 2023-01-31 RX ORDER — FENTANYL CITRATE 50 UG/ML
INJECTION, SOLUTION INTRAMUSCULAR; INTRAVENOUS
Status: DISCONTINUED | OUTPATIENT
Start: 2023-01-31 | End: 2023-01-31 | Stop reason: HOSPADM

## 2023-01-31 RX ORDER — MIDAZOLAM HYDROCHLORIDE 1 MG/ML
INJECTION, SOLUTION INTRAMUSCULAR; INTRAVENOUS
Status: DISCONTINUED | OUTPATIENT
Start: 2023-01-31 | End: 2023-01-31 | Stop reason: HOSPADM

## 2023-01-31 RX ORDER — DEXAMETHASONE SODIUM PHOSPHATE 10 MG/ML
INJECTION INTRAMUSCULAR; INTRAVENOUS
Status: DISCONTINUED | OUTPATIENT
Start: 2023-01-31 | End: 2023-01-31 | Stop reason: HOSPADM

## 2023-01-31 NOTE — H&P
HPI  Patient presenting for Procedure(s) (LRB):  C6/7 IL ROCAEL RN IV Sedation (N/A)     Patient on Anti-coagulation Yes    No health changes since previous encounter    Past Medical History:   Diagnosis Date    Abnormal Pap smear of cervix     HPV genital warts    Anemia     Anxiety     Arthritis     Asthma 10/11/2016    Bipolar 1 disorder     Diabetes mellitus, type 2     Dyslipidemia associated with type 2 diabetes mellitus 2019    General anesthetics causing adverse effect in therapeutic use     Genital warts     GERD (gastroesophageal reflux disease)     Herpes simplex virus (HSV) infection     Hyperlipidemia     Hypertension     Hypertension complicating diabetes 2019    Migraine with aura and without status migrainosus, not intractable 3/21/2016    Mild persistent asthma without complication 10/11/2016    Morbid obesity with body mass index (BMI) of 45.0 to 49.9 in adult 8/3/2017    Obstructive sleep apnea     ALEN (obstructive sleep apnea)     2L per N/C q HS    Schizoaffective disorder, bipolar type 2019    Seasonal allergic rhinitis due to pollen 2019    Type 2 diabetes mellitus with hyperglycemia, with long-term current use of insulin 2017    Type 2 diabetes mellitus with hyperglycemia, without long-term current use of insulin 2017     Past Surgical History:   Procedure Laterality Date    abcess removed right back      BELT ABDOMINOPLASTY      BREAST SURGERY Bilateral     Reduction     SECTION      X 2    COLONOSCOPY      COLONOSCOPY N/A 2018    Procedure: colonoscopy for iron deficiency anemia;  Surgeon: Frankie Sanchez MD;  Location: [facility];  Service: Endoscopy;  Laterality: N/A;    COLONOSCOPY N/A 2018    Procedure: COLONOSCOPY;  Surgeon: Frankie Sanchez MD;  Location: [facility];  Service: Endoscopy;  Laterality: N/A;    HYSTERECTOMY      w/ BSO; hypermenorrhea    MOUTH SURGERY      OOPHORECTOMY      hyst/bso, hypermenorrhea     ROBOT-ASSISTED CHOLECYSTECTOMY USING DA DARI XI N/A 1/3/2020    Procedure: XI ROBOTIC CHOLECYSTECTOMY;  Surgeon: Damir Driscoll MD;  Location: [facility];  Service: General;  Laterality: N/A;    TOTAL REDUCTION MAMMOPLASTY      TUBAL LIGATION       Review of patient's allergies indicates:   Allergen Reactions    Codeine Itching    Hydromorphone Other (See Comments)     Can't wake up for long time if taken  Slow to wake up after surgery after receiving    Aleve [naproxen sodium]     Lyrica [pregabalin] Itching    Motrin [ibuprofen]     Neuromuscular blockers, steroidal Hives     some    Latex, natural rubber Rash    Morphine Rash     itching    Norco [hydrocodone-acetaminophen] Itching, Rash and Hallucinations    Seconal [secobarbital sodium] Rash     itching    Tylox [oxycodone-acetaminophen] Rash        No current facility-administered medications on file prior to encounter.     Current Outpatient Medications on File Prior to Encounter   Medication Sig Dispense Refill    DULoxetine (CYMBALTA) 60 MG capsule Take 1 capsule (60 mg total) by mouth once daily. 30 capsule 2    furosemide (LASIX) 20 MG tablet Take 1 tablet (20 mg total) by mouth once daily. 90 tablet 3    insulin aspart U-100 (NOVOLOG FLEXPEN U-100 INSULIN) 100 unit/mL (3 mL) InPn pen Inject 10 Units into the skin 3 (three) times daily before meals. Use based on sliding scale 15 mL 3    insulin glargine U-300 conc (TOUJEO MAX U-300 SOLOSTAR) 300 unit/mL (3 mL) insulin pen Inject 92 Units into the skin once daily. 4 pen 5    [START ON 2/1/2023] lamoTRIgine (LAMICTAL) 100 MG tablet Take 1 tablet (100 mg total) by mouth once daily. 30 tablet 1    lurasidone (LATUDA) 60 mg Tab tablet Take 1 tablet by mouth daily with 350 calories 30 tablet 2    magnesium oxide (MAG-OX) 400 mg (241.3 mg magnesium) tablet Take 1 tablet (400 mg total) by mouth once daily. 90 tablet 3    metFORMIN (GLUCOPHAGE) 1000 MG tablet Take 1 tablet (1,000 mg total) by mouth 2 (two) times  "daily with meals. 180 tablet 0    metoprolol succinate (TOPROL-XL) 50 MG 24 hr tablet Take 1 tablet (50 mg total) by mouth every evening. 90 tablet 3    albuterol (PROVENTIL/VENTOLIN HFA) 90 mcg/actuation inhaler Inhale 2 puffs into the lungs every 6 hours as needed for wheezing 8.5 g 3    ALPRAZolam (XANAX) 0.25 MG tablet Take 1 tablet (0.25 mg total) by mouth As instructed for Anxiety (take one tablet 60 minutes before MRI, if still feeling anxious take second tablet just before MRI). 2 tablet 0    amlodipine-valsartan (EXFORGE)  mg per tablet Take 1 tablet by mouth once daily. 90 tablet 3    aspirin (ECOTRIN) 81 MG EC tablet Take 81 mg by mouth once daily.      atorvastatin (LIPITOR) 20 MG tablet Take 1 tablet (20 mg total) by mouth every evening. 90 tablet 3    BD INSULIN SYRINGE ULTRA-FINE 1/2 mL 30 gauge x 1/2" Syrg   0    blood sugar diagnostic (ONETOUCH ULTRA TEST) Strp Check blood glucose 4 times daily as directed and as needed (dispense insurance preferred brand or patient choice) 200 each 5    blood sugar diagnostic Strp 1 each by Misc.(Non-Drug; Combo Route) route 4 (four) times daily. [facility] Glucose Test Strips 400 strip 3    blood-glucose meter Misc Use as directed (Patient taking differently: Use as directed) 1 each 0    budesonide-formoterol 160-4.5 mcg (SYMBICORT) 160-4.5 mcg/actuation HFAA Inhale 2 puffs into the lungs every 12 (twelve) hours. Controller : Use spacer 10.2 g 6    butalbital-acetaminophen-caffeine -40 mg (FIORICET, ESGIC) -40 mg per tablet Take by mouth.      clonazePAM (KLONOPIN) 1 MG tablet Take 1 tablet (1 mg total) by mouth once daily. 30 tablet 2    cycloSPORINE 0.05 % Drop Place 1 drop into both eyes 2 (two) times daily. 5.5 mL 11    diclofenac sodium (VOLTAREN) 1 % Gel Apply 2 grams topically 4 (four) times daily as needed. (Patient not taking: Reported on 1/17/2023) 5 each 0    ergocalciferol (ERGOCALCIFEROL) 50,000 unit Cap Take 1 capsule twice weekly for " "3 weeks then weekly 12 capsule 3    fenofibrate (TRICOR) 145 MG tablet Take 1 tablet (145 mg total) by mouth once daily. 90 tablet 3    flash glucose scanning reader ([employer] AMY 2 READER) Misc Use as directed to check blood sugar 1 each 1    fluticasone propionate (FLONASE) 50 mcg/actuation nasal spray 2 sprays (100 mcg total) by Each Nostril route once daily. 16 g 0    frovatriptan (FROVA) 2.5 MG tablet Take 1 tablet at onset of acute migraine. May take 1 more tablet 2 hours later if needed. (MAX 2 TABLET PER DAY. MAX 4 TABLETS PER WEEK.) 9 tablet 1    HEALTHYLAX 17 gram PwPk Take 17 g by mouth 2 (two) times daily.      lamoTRIgine (LAMICTAL) 25 MG tablet Take 1 tablet (25 mg total) by mouth every morning for 14 days, THEN 2 tablets (50 mg total) every morning for 14 days. (Patient not taking: Reported on 1/26/2023) 42 tablet 0    lancets (ONETOUCH DELICA PLUS LANCET) 30 gauge Misc Use as directed 3 times daily 100 each 11    levocetirizine (XYZAL) 5 MG tablet Take 1 tablet (5 mg total) by mouth every evening. 30 tablet 11    LIDOcaine-prilocaine (EMLA) cream Apply topically up to 4 times per day to affected area for pain relief 60 g 0    naltrexone capsule Take 1 capsule (4.5 mg total) by mouth every evening. 90 capsule 2    naproxen (NAPROSYN) 500 MG tablet Take 1 tablet (500 mg total) by mouth 2 (two) times daily. 60 tablet 1    pen needle, diabetic (BD ULTRA-FINE MINI PEN NEEDLE) 31 gauge x 3/16" Ndle Use 5 a day 200 each 3    pen needle, diabetic (BD ULTRA-FINE MINI PEN NEEDLE) 31 gauge x 3/16" Ndle Use one needle 5 times a day 200 each 11    promethazine (PHENERGAN) 12.5 MG Tab       spironolactone (ALDACTONE) 25 MG tablet Take 1 tablet (25 mg total) by mouth once daily. 90 tablet 3    tiZANidine (ZANAFLEX) 4 MG tablet Take 2 tablets (8 mg total) by mouth every 6 (six) hours as needed (pain). 120 tablet 3    [DISCONTINUED] glucagon, human recombinant, (GLUCAGON EMERGENCY KIT, HUMAN,) 1 mg SolR Inject 1 " "mg into the muscle as needed. Emergency use 1 each 1    [DISCONTINUED] valsartan (DIOVAN) 320 MG tablet Take 1 tablet (320 mg total) by mouth once daily. 90 tablet 3        PMHx, PSHx, Allergies, Medications reviewed in epic    ROS negative except pain complaints in HPI    OBJECTIVE:    BP (!) 142/74 (BP Location: Right arm, Patient Position: Sitting)   Temp 98 °F (36.7 °C) (Temporal)   Resp 16   Ht 4' 8" (1.422 m)   Wt 82.1 kg (180 lb 15.6 oz)   SpO2 95%   Breastfeeding No   BMI 40.57 kg/m²     PHYSICAL EXAMINATION:    GENERAL: Well appearing, in no acute distress, alert and oriented x3.  PSYCH:  Mood and affect appropriate.  SKIN: Skin color, texture, turgor normal, no rashes or lesions which will impact the procedure.  CV: RRR with palpation of the radial artery.  PULM: No evidence of respiratory difficulty, symmetric chest rise. Clear to auscultation.  NEURO: Cranial nerves grossly intact.    Plan:    Proceed with procedure as planned Procedure(s) (LRB):  C6/7 IL ROCAEL RN IV Sedation (N/A)    Otto Phillips MD  01/31/2023            "

## 2023-01-31 NOTE — OP NOTE
Cervical Interlaminar Epidural Steroid Injection under Fluoroscopic Guidance.   Time-out taken to identify patient and procedure prior to starting the procedure.     Date of Procedure: 01/31/2023    PROCEDURE: Cervical Interlaminar epidural steroid injection C6-7 under fluoroscopic guidance.     Pre-Op diagnosis: Cervical radiculopathy [M54.12]    Post-Op diagnosis: Cervical radiculopathy [M54.12]    PHYSICIAN: Otto Phillips MD    ASSISTANTS: None     SEDATION: Conscious sedation provided by M.D    The patient was monitored with continuous pulse oximetry, EKG, and intermittent blood pressure monitors, immediately prior to administration of sedation.  The patient was hemodynamically stable throughout the entire process was responsive to voice, and breathing spontaneously.  Supplemental O2 was provided at 2L/min via nasal cannula.  Patient was comfortable for the duration of the procedure.     There was a total of 2mg IV Midazolam and 25mcg Fentanyl titrated for the procedure    Total sedation time was <10 minutes      MEDICATIONS INJECTED: Preservative-free Decadron 10 mg with 1mL of sterile 0.25%Bupivicaine and 3ml of preservative free normal saline.     LOCAL ANESTHETIC INJECTED: Xylocaine 1% 3mL    ESTIMATED BLOOD LOSS: none.     COMPLICATIONS: none.     TECHNIQUE: With the patient laying in a prone position, the area was prepped and draped in the usual sterile fashion using ChloraPrep and a fenestrated drape. Local anesthetic was given using a 27-gauge needle by raising a wheal and going down to the hub of the needle over the C6-7 interlaminar space.  The interlaminar space was then approached with a 3.5 inch 18-gauge Touhy needle was introduced under fluoroscopic guidance in the AP and Lateral view. Once the Ligamentum flavum was encountered loss of resistance to saline was used to enter the epidural space. With positive loss of resistance and negative CSF or Blood, 2mL contrast dye Omnipaque (300mg/ml) was  injected to confirm placement and there was no vascular runoff. The medication was then injected slowly. The patient tolerated the procedure well.     The patient was monitored after the procedure.   They were given post-procedure and discharge instructions to follow at home.  The patient was discharged in a stable condition.

## 2023-01-31 NOTE — DISCHARGE SUMMARY
Discharge Note  Short Stay      SUMMARY     Admit Date: 1/31/2023    Attending Physician: Otto Phillips MD        Discharge Physician: Otto Phillips MD        Discharge Date: 1/31/2023 8:09 AM    Procedure(s) (LRB):  C6/7 IL ROCAEL RN IV Sedation (N/A)    Final Diagnosis: Cervical radiculopathy [M54.12]    Disposition: Home or self care    Patient Instructions:   Current Discharge Medication List        CONTINUE these medications which have NOT CHANGED    Details   DULoxetine (CYMBALTA) 60 MG capsule Take 1 capsule (60 mg total) by mouth once daily.  Qty: 30 capsule, Refills: 2    Associated Diagnoses: Generalized anxiety disorder; Fibromyalgia; Myofascial muscle pain      furosemide (LASIX) 20 MG tablet Take 1 tablet (20 mg total) by mouth once daily.  Qty: 90 tablet, Refills: 3    Associated Diagnoses: Essential hypertension; Hyperaldosteronism; Hyperkalemia      insulin aspart U-100 (NOVOLOG FLEXPEN U-100 INSULIN) 100 unit/mL (3 mL) InPn pen Inject 10 Units into the skin 3 (three) times daily before meals. Use based on sliding scale  Qty: 15 mL, Refills: 3    Associated Diagnoses: Type 2 diabetes mellitus with hyperglycemia, with long-term current use of insulin      insulin glargine U-300 conc (TOUJEO MAX U-300 SOLOSTAR) 300 unit/mL (3 mL) insulin pen Inject 92 Units into the skin once daily.  Qty: 4 pen, Refills: 5    Comments: Titrate up to 120 units daily  Associated Diagnoses: Type 2 diabetes mellitus with hyperglycemia, with long-term current use of insulin      !! lamoTRIgine (LAMICTAL) 100 MG tablet Take 1 tablet (100 mg total) by mouth once daily.  Qty: 30 tablet, Refills: 1    Associated Diagnoses: Bipolar I disorder, most recent episode (or current) depressed      lurasidone (LATUDA) 60 mg Tab tablet Take 1 tablet by mouth daily with 350 calories  Qty: 30 tablet, Refills: 2    Comments: NEED [place]  Associated Diagnoses: Bipolar I disorder, most recent episode (or current) depressed       magnesium oxide (MAG-OX) 400 mg (241.3 mg magnesium) tablet Take 1 tablet (400 mg total) by mouth once daily.  Qty: 90 tablet, Refills: 3    Associated Diagnoses: Essential hypertension      metFORMIN (GLUCOPHAGE) 1000 MG tablet Take 1 tablet (1,000 mg total) by mouth 2 (two) times daily with meals.  Qty: 180 tablet, Refills: 0    Associated Diagnoses: Type 2 diabetes mellitus with hyperglycemia, with long-term current use of insulin      metoprolol succinate (TOPROL-XL) 50 MG 24 hr tablet Take 1 tablet (50 mg total) by mouth every evening.  Qty: 90 tablet, Refills: 3    Comments: .  Associated Diagnoses: Essential hypertension      semaglutide (OZEMPIC) 0.25 mg or 0.5 mg(2 mg/1.5 mL) pen injector Inject 0.25 mg once weekly for 4 weeks, then increase to 0.5 mg once weekly  Qty: 1 pen, Refills: 5    Associated Diagnoses: Type 2 diabetes mellitus with hyperglycemia, with long-term current use of insulin      albuterol (PROVENTIL/VENTOLIN HFA) 90 mcg/actuation inhaler Inhale 2 puffs into the lungs every 6 hours as needed for wheezing  Qty: 8.5 g, Refills: 3    Associated Diagnoses: Asthma, unspecified asthma severity, unspecified whether complicated, unspecified whether persistent      ALPRAZolam (XANAX) 0.25 MG tablet Take 1 tablet (0.25 mg total) by mouth As instructed for Anxiety (take one tablet 60 minutes before MRI, if still feeling anxious take second tablet just before MRI).  Qty: 2 tablet, Refills: 0    Associated Diagnoses: Anxiety      amlodipine-valsartan (EXFORGE)  mg per tablet Take 1 tablet by mouth once daily.  Qty: 90 tablet, Refills: 3    Comments: .  Associated Diagnoses: Essential hypertension      aspirin (ECOTRIN) 81 MG EC tablet Take 81 mg by mouth once daily.      atorvastatin (LIPITOR) 20 MG tablet Take 1 tablet (20 mg total) by mouth every evening.  Qty: 90 tablet, Refills: 3    Associated Diagnoses: Dyslipidemia associated with type 2 diabetes mellitus      BD INSULIN SYRINGE  "ULTRA-FINE 1/2 mL 30 gauge x 1/2" Syrg Refills: 0      !! blood sugar diagnostic (ONETOUCH ULTRA TEST) Strp Check blood glucose 4 times daily as directed and as needed (dispense insurance preferred brand or patient choice)  Qty: 200 each, Refills: 5    Associated Diagnoses: Type 2 diabetes mellitus with other specified complication, with long-term current use of insulin      !! blood sugar diagnostic Strp 1 each by Misc.(Non-Drug; Combo Route) route 4 (four) times daily. [facility] Glucose Test Strips  Qty: 400 strip, Refills: 3    Associated Diagnoses: Uncontrolled type 2 diabetes mellitus with hyperglycemia      blood-glucose meter Misc Use as directed  Qty: 1 each, Refills: 0      budesonide-formoterol 160-4.5 mcg (SYMBICORT) 160-4.5 mcg/actuation HFAA Inhale 2 puffs into the lungs every 12 (twelve) hours. Controller : Use spacer  Qty: 10.2 g, Refills: 6    Associated Diagnoses: Moderate persistent asthma without complication; Restrictive ventilatory defect      !! butalbital-acetaminophen-caffeine -40 mg (FIORICET, ESGIC) -40 mg per tablet Take by mouth.      !! butalbital-acetaminophen-caffeine -40 mg (FIORICET, ESGIC) -40 mg per tablet Take 1 tablet by mouth every 4 hours as needed for headache  Qty: 30 tablet, Refills: 3      clonazePAM (KLONOPIN) 1 MG tablet Take 1 tablet (1 mg total) by mouth once daily.  Qty: 30 tablet, Refills: 2    Associated Diagnoses: Generalized anxiety disorder      cycloSPORINE 0.05 % Drop Place 1 drop into both eyes 2 (two) times daily.  Qty: 5.5 mL, Refills: 11    Associated Diagnoses: Dry eye      diclofenac sodium (VOLTAREN) 1 % Gel Apply 2 grams topically 4 (four) times daily as needed.  Qty: 5 each, Refills: 0    Associated Diagnoses: Lumbar spondylosis; Chronic pain of both shoulders      ergocalciferol (ERGOCALCIFEROL) 50,000 unit Cap Take 1 capsule twice weekly for 3 weeks then weekly  Qty: 12 capsule, Refills: 3    Associated Diagnoses: Vitamin D " deficiency      fenofibrate (TRICOR) 145 MG tablet Take 1 tablet (145 mg total) by mouth once daily.  Qty: 90 tablet, Refills: 3    Associated Diagnoses: Hypertriglyceridemia      flash glucose scanning reader (FREESTYLE AMY 2 READER) Misc Use as directed to check blood sugar  Qty: 1 each, Refills: 1    Comments: cash price  Associated Diagnoses: Uncontrolled type 2 diabetes mellitus with hyperglycemia      fluticasone propionate (FLONASE) 50 mcg/actuation nasal spray 2 sprays (100 mcg total) by Each Nostril route once daily.  Qty: 16 g, Refills: 0    Associated Diagnoses: Upper respiratory tract infection, unspecified type      frovatriptan (FROVA) 2.5 MG tablet Take 1 tablet at onset of acute migraine. May take 1 more tablet 2 hours later if needed. (MAX 2 TABLET PER DAY. MAX 4 TABLETS PER WEEK.)  Qty: 9 tablet, Refills: 1    Associated Diagnoses: Migraine with aura and without status migrainosus, not intractable      HEALTHYLAX 17 gram PwPk Take 17 g by mouth 2 (two) times daily.      !! lamoTRIgine (LAMICTAL) 25 MG tablet Take 1 tablet (25 mg total) by mouth every morning for 14 days, THEN 2 tablets (50 mg total) every morning for 14 days.  Qty: 42 tablet, Refills: 0    Associated Diagnoses: Bipolar I disorder, most recent episode (or current) depressed      lancets (ONETOUCH DELICA PLUS LANCET) 30 gauge Misc Use as directed 3 times daily  Qty: 100 each, Refills: 11    Associated Diagnoses: Type 2 diabetes mellitus with hyperglycemia, with long-term current use of insulin      levocetirizine (XYZAL) 5 MG tablet Take 1 tablet (5 mg total) by mouth every evening.  Qty: 30 tablet, Refills: 11      LIDOcaine-prilocaine (EMLA) cream Apply topically up to 4 times per day to affected area for pain relief  Qty: 60 g, Refills: 0    Associated Diagnoses: Lumbar spondylosis; Chronic pain of both shoulders      mirtazapine (REMERON) 7.5 MG Tab Take 7.5 mg by mouth every evening.      naltrexone capsule Take 1 capsule (4.5  "mg total) by mouth every evening.  Qty: 90 capsule, Refills: 2    Associated Diagnoses: Fibromyalgia; Fatigue, unspecified type      naproxen (NAPROSYN) 500 MG tablet Take 1 tablet (500 mg total) by mouth 2 (two) times daily.  Qty: 60 tablet, Refills: 1    Associated Diagnoses: Strain of right trapezius muscle, initial encounter; Tendinosis of rotator cuff; Cervical radiculopathy      omeprazole (PRILOSEC) 40 MG capsule Take 1 capsule (40 mg total) by mouth once daily.  Qty: 30 capsule, Refills: 11    Comments: Failed omeprazole 20 mg and Nexium 20 mg.  Associated Diagnoses: Gastroesophageal reflux disease, unspecified whether esophagitis present      !! pen needle, diabetic (BD ULTRA-FINE MINI PEN NEEDLE) 31 gauge x 3/16" Ndle Use 5 a day  Qty: 200 each, Refills: 3    Associated Diagnoses: Type 2 diabetes, uncontrolled, with neuropathy      !! pen needle, diabetic (BD ULTRA-FINE MINI PEN NEEDLE) 31 gauge x 3/16" Ndle Use one needle 5 times a day  Qty: 200 each, Refills: 11    Associated Diagnoses: Type 2 diabetes mellitus with hyperglycemia, with long-term current use of insulin      pneumoc 20-tatiana conj-dip cr,PF, (PREVNAR-20, PF,) 0.5 mL Syrg injection Inject 0.5 mLs into the muscle.  Qty: 0.5 mL, Refills: 0      promethazine (PHENERGAN) 12.5 MG Tab       prucalopride (MOTEGRITY) 2 mg Tab Take 1 tablet (2 mg) by mouth once daily.  Qty: 30 tablet, Refills: 11    Comments: Failed Miralax, Lactulose, Linzess, Amitiza, and Trulance.  Associated Diagnoses: Chronic idiopathic constipation      spironolactone (ALDACTONE) 25 MG tablet Take 1 tablet (25 mg total) by mouth once daily.  Qty: 90 tablet, Refills: 3    Comments: .  Associated Diagnoses: Essential hypertension; Hyperaldosteronism      SUPREP BOWEL PREP KIT 17.5-3.13-1.6 gram SolR Use as directed  Qty: 354 mL, Refills: 0    Associated Diagnoses: Gastroesophageal reflux disease, unspecified whether esophagitis present; History of colon polyps; Esophageal dysphagia "      tiZANidine (ZANAFLEX) 4 MG tablet Take 2 tablets (8 mg total) by mouth every 6 (six) hours as needed (pain).  Qty: 120 tablet, Refills: 3       !! - Potential duplicate medications found. Please discuss with provider.              Discharge Diagnosis: Cervical radiculopathy [M54.12]  Condition on Discharge: Stable with no complications to procedure   Diet on Discharge: Same as before.  Activity: as per instruction sheet.  Discharge to: Home with a responsible adult.  Follow up: 2-4 weeks       Please call the office at [phone] if you experience any weakness or loss of sensation, fever > 101.5, pain uncontrolled with oral medications, persistent nausea/vomiting/or diarrhea, redness or drainage from the incisions, or any other worrisome concerns. If physician on call was not reached or could not communicate with our office for any reason please go to the nearest emergency department

## 2023-01-31 NOTE — DISCHARGE INSTRUCTIONS

## 2023-02-01 ENCOUNTER — TELEPHONE (OUTPATIENT)
Dept: PHARMACY | Facility: CLINIC | Age: 53
End: 2023-02-01
Payer: MEDICAID

## 2023-02-01 ENCOUNTER — HOSPITAL ENCOUNTER (OUTPATIENT)
Dept: PREADMISSION TESTING | Facility: HOSPITAL | Age: 53
Discharge: HOME | End: 2023-02-01
Attending: NURSE PRACTITIONER
Payer: MEDICAID

## 2023-02-01 ENCOUNTER — PATIENT OUTREACH (OUTPATIENT)
Dept: ADMINISTRATIVE | Facility: OTHER | Age: 53
End: 2023-02-01
Payer: MEDICAID

## 2023-02-01 ENCOUNTER — OFFICE VISIT (OUTPATIENT)
Dept: PRIMARY CARE CLINIC | Facility: CLINIC | Age: 53
End: 2023-02-01
Payer: MEDICAID

## 2023-02-01 DIAGNOSIS — E26.9 HYPERALDOSTERONISM: ICD-10-CM

## 2023-02-01 DIAGNOSIS — Z86.010 HISTORY OF COLON POLYPS: ICD-10-CM

## 2023-02-01 DIAGNOSIS — E55.9 VITAMIN D DEFICIENCY: ICD-10-CM

## 2023-02-01 DIAGNOSIS — K21.9 GASTROESOPHAGEAL REFLUX DISEASE, UNSPECIFIED WHETHER ESOPHAGITIS PRESENT: ICD-10-CM

## 2023-02-01 DIAGNOSIS — R94.2 RESTRICTIVE VENTILATORY DEFECT: ICD-10-CM

## 2023-02-01 DIAGNOSIS — J45.40 MODERATE PERSISTENT ASTHMA WITHOUT COMPLICATION: ICD-10-CM

## 2023-02-01 DIAGNOSIS — E11.42 DM TYPE 2 WITH DIABETIC PERIPHERAL NEUROPATHY: ICD-10-CM

## 2023-02-01 DIAGNOSIS — Z79.4 TYPE 2 DIABETES MELLITUS WITH HYPERGLYCEMIA, WITH LONG-TERM CURRENT USE OF INSULIN: Primary | ICD-10-CM

## 2023-02-01 DIAGNOSIS — M54.2 CERVICALGIA: ICD-10-CM

## 2023-02-01 DIAGNOSIS — I10 ESSENTIAL HYPERTENSION: Chronic | ICD-10-CM

## 2023-02-01 DIAGNOSIS — R13.19 ESOPHAGEAL DYSPHAGIA: ICD-10-CM

## 2023-02-01 DIAGNOSIS — E78.2 MIXED HYPERLIPIDEMIA: ICD-10-CM

## 2023-02-01 DIAGNOSIS — E87.5 HYPERKALEMIA: ICD-10-CM

## 2023-02-01 DIAGNOSIS — E11.65 TYPE 2 DIABETES MELLITUS WITH HYPERGLYCEMIA, WITH LONG-TERM CURRENT USE OF INSULIN: Primary | ICD-10-CM

## 2023-02-01 PROCEDURE — 4010F PR ACE/ARB THEARPY RXD/TAKEN: ICD-10-PCS | Mod: CPTII,95,, | Performed by: FAMILY MEDICINE

## 2023-02-01 PROCEDURE — 3072F PR LOW RISK FOR RETINOPATHY: ICD-10-PCS | Mod: CPTII,95,, | Performed by: FAMILY MEDICINE

## 2023-02-01 PROCEDURE — 4010F ACE/ARB THERAPY RXD/TAKEN: CPT | Mod: CPTII,95,, | Performed by: FAMILY MEDICINE

## 2023-02-01 PROCEDURE — 99214 PR OFFICE/OUTPT VISIT, EST, LEVL IV, 30-39 MIN: ICD-10-PCS | Mod: 95,,, | Performed by: FAMILY MEDICINE

## 2023-02-01 PROCEDURE — 3044F PR MOST RECENT HEMOGLOBIN A1C LEVEL <7.0%: ICD-10-PCS | Mod: CPTII,95,, | Performed by: FAMILY MEDICINE

## 2023-02-01 PROCEDURE — 3072F LOW RISK FOR RETINOPATHY: CPT | Mod: CPTII,95,, | Performed by: FAMILY MEDICINE

## 2023-02-01 PROCEDURE — 3044F HG A1C LEVEL LT 7.0%: CPT | Mod: CPTII,95,, | Performed by: FAMILY MEDICINE

## 2023-02-01 PROCEDURE — 99214 OFFICE O/P EST MOD 30 MIN: CPT | Mod: 95,,, | Performed by: FAMILY MEDICINE

## 2023-02-01 RX ORDER — FUROSEMIDE 20 MG/1
20 TABLET ORAL DAILY
Qty: 90 TABLET | Refills: 3 | Status: SHIPPED | OUTPATIENT
Start: 2023-02-01 | End: 2024-03-04 | Stop reason: SDUPTHER

## 2023-02-01 RX ORDER — SPIRONOLACTONE 25 MG/1
25 TABLET ORAL DAILY
Qty: 90 TABLET | Refills: 3 | Status: SHIPPED | OUTPATIENT
Start: 2023-02-01 | End: 2024-03-04 | Stop reason: SDUPTHER

## 2023-02-01 RX ORDER — ERGOCALCIFEROL 1.25 MG/1
CAPSULE ORAL
Qty: 12 CAPSULE | Refills: 3 | Status: SHIPPED | OUTPATIENT
Start: 2023-02-01 | End: 2023-11-21 | Stop reason: SDUPTHER

## 2023-02-01 RX ORDER — METOPROLOL SUCCINATE 50 MG/1
50 TABLET, EXTENDED RELEASE ORAL NIGHTLY
Qty: 90 TABLET | Refills: 3 | Status: SHIPPED | OUTPATIENT
Start: 2023-02-01 | End: 2023-06-29

## 2023-02-01 RX ORDER — AMLODIPINE AND VALSARTAN 10; 320 MG/1; MG/1
1 TABLET ORAL DAILY
Qty: 90 TABLET | Refills: 3 | Status: SHIPPED | OUTPATIENT
Start: 2023-02-01 | End: 2024-03-04 | Stop reason: SDUPTHER

## 2023-02-01 RX ORDER — BUDESONIDE AND FORMOTEROL FUMARATE DIHYDRATE 160; 4.5 UG/1; UG/1
2 AEROSOL RESPIRATORY (INHALATION) EVERY 12 HOURS
Qty: 10.2 G | Refills: 6 | Status: SHIPPED | OUTPATIENT
Start: 2023-02-01 | End: 2023-10-10

## 2023-02-01 RX ORDER — OMEPRAZOLE 40 MG/1
40 CAPSULE, DELAYED RELEASE ORAL DAILY
Qty: 30 CAPSULE | Refills: 11 | Status: SHIPPED | OUTPATIENT
Start: 2023-02-01 | End: 2024-03-05 | Stop reason: SDUPTHER

## 2023-02-01 NOTE — PROGRESS NOTES
CHW - Initial Contact    This Community Health Worker completed the Social Determinant of Health questionnaire with patient via telephone today.    Pt identified barriers of most importance are. Patient barriers are housing [phone], Housing rental [phone]) and I did give her [employer] ([phone]) as well  Referrals to community agencies completed with patient consent outside of [facility] include. Outside referral is HUB, Housing rental and [employer].  Referrals were put through [facility] - no  Support and Services: Housing  Other information discussed the patient needs help with. No other information was discussed.   Follow up required: Yes  Follow-up Outreach - Due: 2/6/2023

## 2023-02-01 NOTE — PROGRESS NOTES
Subjective:    The patient location is: [place] at home  Visit type: Virtual visit with synchronous audio and video  Total time spent with patient: 20 mins  Each patient to whom he or she provides medical services by telemedicine is:  (1) informed of the relationship between the physician and patient and the respective role of any other health care provider with respect to management of the patient; and (2) notified that he or she may decline to receive medical services by telemedicine and may withdraw from such care at any time.     Patient ID: Yolanda Betts is a 52 y.o. female.    Chief Complaint: HTN, DM II and neck pain and Schizoaffective disorder    History of Present Illness:   Yolanda Betts 52 y.o. female presents today with     Per pt, BG is running high since she got steroid injection for neck pain. She is on Tujeo 92 units and ozempic 0.25mg.  She is going to IOP program for psych diagnosis including schizoaffective disorder from now till April on FMLA and asking for financial assistance.   HTN/Vit D/headache: are controlled. Will need refill.  Past Medical History:   Diagnosis Date    Abnormal Pap smear of cervix     HPV genital warts    Anemia     Anxiety     Arthritis     Asthma 10/11/2016    Bipolar 1 disorder     Diabetes mellitus, type 2     Dyslipidemia associated with type 2 diabetes mellitus 5/13/2019    General anesthetics causing adverse effect in therapeutic use     Genital warts     GERD (gastroesophageal reflux disease)     Herpes simplex virus (HSV) infection     Hyperlipidemia     Hypertension     Hypertension complicating diabetes 5/5/2019    Migraine with aura and without status migrainosus, not intractable 3/21/2016    Mild persistent asthma without complication 10/11/2016    Morbid obesity with body mass index (BMI) of 45.0 to 49.9 in adult 8/3/2017    Obstructive sleep apnea     ALEN (obstructive sleep apnea)     2L per N/C q HS    Schizoaffective disorder, bipolar type  "4/25/2019    Seasonal allergic rhinitis due to pollen 5/13/2019    Type 2 diabetes mellitus with hyperglycemia, with long-term current use of insulin 12/21/2017    Type 2 diabetes mellitus with hyperglycemia, without long-term current use of insulin 12/21/2017     Family History   Problem Relation Age of Onset    Diabetes Mother     Hyperlipidemia Mother     Hypertension Mother     Asthma Mother     COPD Mother     Glaucoma Mother     Thyroid disease Mother     Anesthesia problems Mother         "almost had a cardiac arrest" , blood clots    Hypertension Father     Hyperlipidemia Father     Cancer Father         Brain, lung, liver, kidney    Heart disease Maternal Grandmother     Hyperlipidemia Maternal Grandmother     Hypertension Maternal Grandmother     Cataracts Maternal Grandmother     Diabetes Maternal Grandmother     Heart disease Maternal Grandfather     Hyperlipidemia Maternal Grandfather     Hypertension Maternal Grandfather     Glaucoma Maternal Grandfather     Cancer Maternal Grandfather     Cataracts Maternal Grandfather     Macular degeneration Maternal Grandfather     Diabetes Maternal Grandfather     Heart disease Paternal Grandmother     Hyperlipidemia Paternal Grandmother     Hypertension Paternal Grandmother     Cataracts Paternal Grandmother     Heart disease Paternal Grandfather     Hyperlipidemia Paternal Grandfather     Hypertension Paternal Grandfather     Cataracts Paternal Grandfather     Breast cancer Maternal Cousin     Breast cancer Maternal Cousin     Breast cancer Maternal Cousin     Breast cancer Maternal Cousin      Social History     Socioeconomic History    Marital status: Single   Tobacco Use    Smoking status: Never    Smokeless tobacco: Never   Substance and Sexual Activity    Alcohol use: Yes     Alcohol/week: 0.0 standard drinks     Comment: socially  No alcohol 72h prior to sx    Drug use: No    Sexual activity: Yes     Partners: Male     Birth control/protection: Surgical    "  Comment: john   Social History Narrative    Long-term care nurse     Social Determinants of Health     Financial Resource Strain: High Risk    Difficulty of Paying Living Expenses: Hard   Food Insecurity: Food Insecurity Present    Worried About Running Out of Food in the Last Year: Often true    Ran Out of Food in the Last Year: Often true   Transportation Needs: No Transportation Needs    Lack of Transportation (Medical): No    Lack of Transportation (Non-Medical): No   Physical Activity: Inactive    Days of Exercise per Week: 0 days    Minutes of Exercise per Session: 0 min   Stress: Stress Concern Present    Feeling of Stress : Rather much   Social Connections: Moderately Isolated    Frequency of Communication with Friends and Family: Twice a week    Frequency of Social Gatherings with Friends and Family: Twice a week    Attends Islam Services: More than 4 times per year    Active Member of Clubs or Organizations: No    Attends Club or Organization Meetings: Never    Marital Status: Never    Housing Stability: High Risk    Unable to Pay for Housing in the Last Year: Yes    Number of Places Lived in the Last Year: 1    Unstable Housing in the Last Year: No     Outpatient Encounter Medications as of 2/1/2023   Medication Sig Dispense Refill    albuterol (PROVENTIL/VENTOLIN HFA) 90 mcg/actuation inhaler Inhale 2 puffs into the lungs every 6 hours as needed for wheezing 8.5 g 3    ALPRAZolam (XANAX) 0.25 MG tablet Take 1 tablet (0.25 mg total) by mouth As instructed for Anxiety (take one tablet 60 minutes before MRI, if still feeling anxious take second tablet just before MRI). 2 tablet 0    amlodipine-valsartan (EXFORGE)  mg per tablet Take 1 tablet by mouth once daily. 90 tablet 3    aspirin (ECOTRIN) 81 MG EC tablet Take 81 mg by mouth once daily.      atorvastatin (LIPITOR) 20 MG tablet Take 1 tablet (20 mg total) by mouth every evening. 90 tablet 3    BD INSULIN SYRINGE ULTRA-FINE 1/2 mL 30  "gauge x 1/2" Syrg   0    blood sugar diagnostic (ONETOUCH ULTRA TEST) Strp Check blood glucose 4 times daily as directed and as needed (dispense insurance preferred brand or patient choice) 200 each 5    blood sugar diagnostic Strp 1 each by Misc.(Non-Drug; Combo Route) route 4 (four) times daily. [facility] Glucose Test Strips 400 strip 3    blood-glucose meter Misc Use as directed (Patient taking differently: Use as directed) 1 each 0    budesonide-formoterol 160-4.5 mcg (SYMBICORT) 160-4.5 mcg/actuation HFAA Inhale 2 puffs into the lungs every 12 (twelve) hours. Controller : Use spacer 10.2 g 6    butalbital-acetaminophen-caffeine -40 mg (FIORICET, ESGIC) -40 mg per tablet Take by mouth.      butalbital-acetaminophen-caffeine -40 mg (FIORICET, ESGIC) -40 mg per tablet Take 1 tablet by mouth every 4 hours as needed for headache 30 tablet 3    clonazePAM (KLONOPIN) 1 MG tablet Take 1 tablet (1 mg total) by mouth once daily. 30 tablet 2    cycloSPORINE 0.05 % Drop Place 1 drop into both eyes 2 (two) times daily. 5.5 mL 11    diclofenac sodium (VOLTAREN) 1 % Gel Apply 2 grams topically 4 (four) times daily as needed. (Patient not taking: Reported on 1/17/2023) 5 each 0    DULoxetine (CYMBALTA) 60 MG capsule Take 1 capsule (60 mg total) by mouth once daily. 30 capsule 2    DULoxetine (CYMBALTA) 60 MG capsule Take 1 capsule by mouth daily 30 capsule 2    ergocalciferol (ERGOCALCIFEROL) 50,000 unit Cap Take 1 capsule twice weekly for 3 weeks then weekly 12 capsule 3    fenofibrate (TRICOR) 145 MG tablet Take 1 tablet (145 mg total) by mouth once daily. 90 tablet 3    flash glucose scanning reader (FREESTYLE AMY 2 READER) Misc Use as directed to check blood sugar 1 each 1    fluticasone propionate (FLONASE) 50 mcg/actuation nasal spray 2 sprays (100 mcg total) by Each Nostril route once daily. 16 g 0    frovatriptan (FROVA) 2.5 MG tablet Take 1 tablet at onset of acute migraine. May take 1 more " tablet 2 hours later if needed. (MAX 2 TABLET PER DAY. MAX 4 TABLETS PER WEEK.) 9 tablet 1    furosemide (LASIX) 20 MG tablet Take 1 tablet (20 mg total) by mouth once daily. 90 tablet 3    HEALTHYLAX 17 gram PwPk Take 17 g by mouth 2 (two) times daily.      insulin aspart U-100 (NOVOLOG FLEXPEN U-100 INSULIN) 100 unit/mL (3 mL) InPn pen Inject 10 Units into the skin 3 (three) times daily before meals. Use based on sliding scale 15 mL 3    insulin glargine U-300 conc (TOUJEO MAX U-300 SOLOSTAR) 300 unit/mL (3 mL) insulin pen Inject 92 Units into the skin once daily. 4 pen 5    lamoTRIgine (LAMICTAL) 100 MG tablet Take 1 tablet (100 mg total) by mouth once daily. 30 tablet 1    lamoTRIgine (LAMICTAL) 25 MG tablet Take 1 tablet (25 mg total) by mouth every morning for 14 days, THEN 2 tablets (50 mg total) every morning for 14 days. (Patient not taking: Reported on 1/26/2023) 42 tablet 0    lancets (ONETOUCH DELICA PLUS LANCET) 30 gauge Misc Use as directed 3 times daily 100 each 11    levocetirizine (XYZAL) 5 MG tablet Take 1 tablet (5 mg total) by mouth every evening. 30 tablet 11    LIDOcaine-prilocaine (EMLA) cream Apply topically up to 4 times per day to affected area for pain relief 60 g 0    lurasidone (LATUDA) 60 mg Tab tablet Take 1 tablet by mouth daily with 350 calories 30 tablet 2    magnesium oxide (MAG-OX) 400 mg (241.3 mg magnesium) tablet Take 1 tablet (400 mg total) by mouth once daily. 90 tablet 3    metFORMIN (GLUCOPHAGE) 1000 MG tablet Take 1 tablet (1,000 mg total) by mouth 2 (two) times daily with meals. 180 tablet 0    metoprolol succinate (TOPROL-XL) 50 MG 24 hr tablet Take 1 tablet (50 mg total) by mouth every evening. 90 tablet 3    mirtazapine (REMERON) 7.5 MG Tab Take 7.5 mg by mouth every evening.      naltrexone capsule Take 1 capsule (4.5 mg total) by mouth every evening. 90 capsule 2    naproxen (NAPROSYN) 500 MG tablet Take 1 tablet (500 mg total) by mouth 2 (two) times daily. 60 tablet  "1    omeprazole (PRILOSEC) 40 MG capsule Take 1 capsule (40 mg total) by mouth once daily. 30 capsule 11    pen needle, diabetic (BD ULTRA-FINE MINI PEN NEEDLE) 31 gauge x 3/16" Ndle Use 5 a day 200 each 3    pen needle, diabetic (BD ULTRA-FINE MINI PEN NEEDLE) 31 gauge x 3/16" Ndle Use one needle 5 times a day 200 each 11    pneumoc 20-tatiana conj-dip cr,PF, (PREVNAR-20, PF,) 0.5 mL Syrg injection Inject 0.5 mLs into the muscle. 0.5 mL 0    promethazine (PHENERGAN) 12.5 MG Tab       prucalopride (MOTEGRITY) 2 mg Tab Take 1 tablet (2 mg) by mouth once daily. 30 tablet 11    semaglutide (OZEMPIC) 0.25 mg or 0.5 mg(2 mg/1.5 mL) pen injector Inject 0.25 mg once weekly for 4 weeks, then increase to 0.5 mg once weekly 1 pen 5    spironolactone (ALDACTONE) 25 MG tablet Take 1 tablet (25 mg total) by mouth once daily. 90 tablet 3    SUPREP BOWEL PREP KIT 17.5-3.13-1.6 gram SolR Use as directed 354 mL 0    tiZANidine (ZANAFLEX) 4 MG tablet Take 2 tablets (8 mg total) by mouth every 6 (six) hours as needed (pain). 120 tablet 3    [DISCONTINUED] amlodipine-valsartan (EXFORGE)  mg per tablet Take 1 tablet by mouth once daily. 90 tablet 3    [DISCONTINUED] budesonide-formoterol 160-4.5 mcg (SYMBICORT) 160-4.5 mcg/actuation HFAA Inhale 2 puffs into the lungs every 12 (twelve) hours. Controller : Use spacer 10.2 g 6    [DISCONTINUED] ergocalciferol (ERGOCALCIFEROL) 50,000 unit Cap Take 1 capsule twice weekly for 3 weeks then weekly 12 capsule 3    [DISCONTINUED] furosemide (LASIX) 20 MG tablet Take 1 tablet (20 mg total) by mouth once daily. 90 tablet 3    [DISCONTINUED] glucagon, human recombinant, (GLUCAGON EMERGENCY KIT, HUMAN,) 1 mg SolR Inject 1 mg into the muscle as needed. Emergency use 1 each 1    [DISCONTINUED] metoprolol succinate (TOPROL-XL) 50 MG 24 hr tablet Take 1 tablet (50 mg total) by mouth every evening. 90 tablet 3    [DISCONTINUED] omeprazole (PRILOSEC) 40 MG capsule Take 1 capsule (40 mg total) by mouth " once daily. 30 capsule 11    [DISCONTINUED] spironolactone (ALDACTONE) 25 MG tablet Take 1 tablet (25 mg total) by mouth once daily. 90 tablet 3    [DISCONTINUED] valsartan (DIOVAN) 320 MG tablet Take 1 tablet (320 mg total) by mouth once daily. 90 tablet 3     Facility-Administered Encounter Medications as of 2/1/2023   Medication Dose Route Frequency Provider Last Rate Last Admin    [DISCONTINUED] BUPivacaine (PF) 0.25% (2.5 mg/ml) injection    PRN Otto Phillips MD   1 mL at 01/31/23 0800    [DISCONTINUED] dexAMETHasone sodium phos (PF) injection    PRN Otto Phillips MD   10 mg at 01/31/23 0800    [DISCONTINUED] fentaNYL 50 mcg/mL injection    PRN Otto Phillips MD   25 mcg at 01/31/23 0758    [DISCONTINUED] iohexoL (OMNIPAQUE 240) injection    PRN Otto Phillips MD   1.5 mL at 01/31/23 0800    [DISCONTINUED] midazolam injection    PRN Otto Phillips MD   2 mg at 01/31/23 0758    [DISCONTINUED] ondansetron injection 4 mg  4 mg Intravenous Once PRN Otto Phillips MD           Review of Systems    Review of Systems      A complete 10 point ROS was completed and are positive as per above HPI.    Otherwise negative for fever, diplopia, chest pain, shortness of breath, vomiting, blood in urine, joint pain, skin rash, seizures and unusual bleeding.     Objective:      There were no vitals taken for this visit.  Physical Exam    CONSTITUTIONAL: No apparent distress. Appears comfortable. Does not appear acutely ill or septic. Appears adequately hydrated.  CARDIOVASCULAR: No perioral cyanosis  PULMONARY: Breathing unlabored. No retractions Chest expansion grossly normal.  PSYCHIATRIC: Alert and conversant and grossly oriented. Mood is grossly neutral. Affect appropriate. Judgment and insight grossly intact.  NEUROLOGIC: No focal sensory deficits reported.   Results for orders placed or performed during the hospital encounter of 01/31/23   POCT glucose   Result Value Ref Range    POCT Glucose 121 (H) 70  - 110 mg/dL     *Note: Due to a large number of results and/or encounters for the requested time period, some results have not been displayed. A complete set of results can be found in Results Review.     Assessment:       1. Type 2 diabetes mellitus with hyperglycemia, with long-term current use of insulin    2. Cervicalgia    3. DM type 2 with diabetic peripheral neuropathy    4. Mixed hyperlipidemia    5. Essential hypertension    6. Hyperaldosteronism    7. Hyperkalemia    8. Gastroesophageal reflux disease, unspecified whether esophagitis present    9. Moderate persistent asthma without complication    10. Restrictive ventilatory defect    11. Vitamin D deficiency          Plan:   Type 2 diabetes mellitus with hyperglycemia, with long-term current use of insulin  Comments:  Discussed titration of Tujeo. Go up to 94 units and go up by one units every 3 days if you are staying above 180    Cervicalgia    DM type 2 with diabetic peripheral neuropathy    Mixed hyperlipidemia  -     Lipids Digital Medicine (LDMP) Enrollment Order  -     Lipids Digital Medicine (LDMP): Assign Onboarding Questionnaires    Essential hypertension  -     amlodipine-valsartan (EXFORGE)  mg per tablet; Take 1 tablet by mouth once daily.  Dispense: 90 tablet; Refill: 3  -     metoprolol succinate (TOPROL-XL) 50 MG 24 hr tablet; Take 1 tablet (50 mg total) by mouth every evening.  Dispense: 90 tablet; Refill: 3  -     spironolactone (ALDACTONE) 25 MG tablet; Take 1 tablet (25 mg total) by mouth once daily.  Dispense: 90 tablet; Refill: 3  -     furosemide (LASIX) 20 MG tablet; Take 1 tablet (20 mg total) by mouth once daily.  Dispense: 90 tablet; Refill: 3    Hyperaldosteronism  -     spironolactone (ALDACTONE) 25 MG tablet; Take 1 tablet (25 mg total) by mouth once daily.  Dispense: 90 tablet; Refill: 3  -     furosemide (LASIX) 20 MG tablet; Take 1 tablet (20 mg total) by mouth once daily.  Dispense: 90 tablet; Refill:  3    Hyperkalemia  -     furosemide (LASIX) 20 MG tablet; Take 1 tablet (20 mg total) by mouth once daily.  Dispense: 90 tablet; Refill: 3    Gastroesophageal reflux disease, unspecified whether esophagitis present  -     omeprazole (PRILOSEC) 40 MG capsule; Take 1 capsule (40 mg total) by mouth once daily.  Dispense: 30 capsule; Refill: 11    Moderate persistent asthma without complication  -     budesonide-formoterol 160-4.5 mcg (SYMBICORT) 160-4.5 mcg/actuation HFAA; Inhale 2 puffs into the lungs every 12 (twelve) hours. Controller : Use spacer  Dispense: 10.2 g; Refill: 6    Restrictive ventilatory defect  -     budesonide-formoterol 160-4.5 mcg (SYMBICORT) 160-4.5 mcg/actuation HFAA; Inhale 2 puffs into the lungs every 12 (twelve) hours. Controller : Use spacer  Dispense: 10.2 g; Refill: 6    Vitamin D deficiency  -     ergocalciferol (ERGOCALCIFEROL) 50,000 unit Cap; Take 1 capsule twice weekly for 3 weeks then weekly  Dispense: 12 capsule; Refill: 3        I have reviewed all of the patient's clinical history available in care everywhere and Epic and have utilized this in my evaluation and management recommendations today.   Treatment options and alternatives were discussed with the patient. Patient was given ample time to ask questions. All questions were answered. Voices understanding and acceptance of this advice. Will call back if any further questions or concerns.  Sasha Sorenson MD

## 2023-02-02 ENCOUNTER — TELEPHONE (OUTPATIENT)
Dept: OBSTETRICS AND GYNECOLOGY | Facility: CLINIC | Age: 53
End: 2023-02-02
Payer: MEDICAID
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
## 2023-02-02 NOTE — TELEPHONE ENCOUNTER
----- Message from Yudy Escobedo sent at 2/2/2023  2:12 PM CST -----  Contact: Yolanda  Type:  Sooner Apoointment Request    Caller is requesting a sooner appointment. Caller will not accept being placed on the waitlist and is requesting a message be sent to doctor.  Name of Caller:Yolanda  When is the first available appointment?unknown  Symptoms:Pelvic pain/annual  Would the patient rather a call back or a response via [facility]? call  Best Call Back Number:[phone]  Additional Information: Patient request to schedule an appointment sooner than next available.  Thank you,  [facility]

## 2023-02-06 ENCOUNTER — OFFICE VISIT (OUTPATIENT)
Dept: OBSTETRICS AND GYNECOLOGY | Facility: CLINIC | Age: 53
End: 2023-02-06
Payer: MEDICAID

## 2023-02-06 ENCOUNTER — TELEPHONE (OUTPATIENT)
Dept: OBSTETRICS AND GYNECOLOGY | Facility: CLINIC | Age: 53
End: 2023-02-06
Payer: MEDICAID

## 2023-02-06 ENCOUNTER — LAB VISIT (OUTPATIENT)
Dept: LAB | Facility: HOSPITAL | Age: 53
End: 2023-02-06
Payer: MEDICAID

## 2023-02-06 VITALS
SYSTOLIC BLOOD PRESSURE: 100 MMHG | HEIGHT: 56 IN | WEIGHT: 185.19 LBS | BODY MASS INDEX: 41.66 KG/M2 | DIASTOLIC BLOOD PRESSURE: 72 MMHG

## 2023-02-06 DIAGNOSIS — Z11.3 SCREEN FOR STD (SEXUALLY TRANSMITTED DISEASE): ICD-10-CM

## 2023-02-06 DIAGNOSIS — N95.1 VAGINAL DRYNESS, MENOPAUSAL: ICD-10-CM

## 2023-02-06 DIAGNOSIS — Z71.89 COUNSELING FOR HORMONE REPLACEMENT THERAPY: ICD-10-CM

## 2023-02-06 DIAGNOSIS — N95.2 VAGINAL ATROPHY: ICD-10-CM

## 2023-02-06 DIAGNOSIS — R10.2 PELVIC PAIN: Primary | ICD-10-CM

## 2023-02-06 LAB
ALBUMIN SERPL BCP-MCNC: 3.9 G/DL (ref 3.5–5.2)
ALP SERPL-CCNC: 56 U/L (ref 55–135)
ALT SERPL W/O P-5'-P-CCNC: 9 U/L (ref 10–44)
ANION GAP SERPL CALC-SCNC: 11 MMOL/L (ref 8–16)
AST SERPL-CCNC: 17 U/L (ref 10–40)
BILIRUB SERPL-MCNC: 0.3 MG/DL (ref 0.1–1)
BILIRUB SERPL-MCNC: NEGATIVE MG/DL
BLOOD URINE, POC: NEGATIVE
BUN SERPL-MCNC: 16 MG/DL (ref 6–20)
CALCIUM SERPL-MCNC: 10.2 MG/DL (ref 8.7–10.5)
CHLORIDE SERPL-SCNC: 101 MMOL/L (ref 95–110)
CLARITY, POC UA: CLEAR
CO2 SERPL-SCNC: 24 MMOL/L (ref 23–29)
COLOR, POC UA: NORMAL
CREAT SERPL-MCNC: 0.9 MG/DL (ref 0.5–1.4)
EST. GFR  (NO RACE VARIABLE): >60 ML/MIN/1.73 M^2
GLUCOSE SERPL-MCNC: 60 MG/DL (ref 70–110)
GLUCOSE UR QL STRIP: NORMAL
KETONES UR QL STRIP: NEGATIVE
LEUKOCYTE ESTERASE URINE, POC: NEGATIVE
NITRITE, POC UA: NEGATIVE
PH, POC UA: 8
POTASSIUM SERPL-SCNC: 4.5 MMOL/L (ref 3.5–5.1)
PROT SERPL-MCNC: 7.2 G/DL (ref 6–8.4)
PROTEIN, POC: NEGATIVE
SODIUM SERPL-SCNC: 136 MMOL/L (ref 136–145)
SPECIFIC GRAVITY, POC UA: 1.01
UROBILINOGEN, POC UA: NORMAL

## 2023-02-06 PROCEDURE — 3008F BODY MASS INDEX DOCD: CPT | Mod: CPTII,,,

## 2023-02-06 PROCEDURE — 1159F PR MEDICATION LIST DOCUMENTED IN MEDICAL RECORD: ICD-10-PCS | Mod: CPTII,,,

## 2023-02-06 PROCEDURE — 3074F SYST BP LT 130 MM HG: CPT | Mod: CPTII,,,

## 2023-02-06 PROCEDURE — 3078F DIAST BP <80 MM HG: CPT | Mod: CPTII,,,

## 2023-02-06 PROCEDURE — 86592 SYPHILIS TEST NON-TREP QUAL: CPT

## 2023-02-06 PROCEDURE — 99999 PR PBB SHADOW E&M-EST. PATIENT-LVL V: ICD-10-PCS | Mod: PBBFAC,,,

## 2023-02-06 PROCEDURE — 99213 PR OFFICE/OUTPT VISIT, EST, LEVL III, 20-29 MIN: ICD-10-PCS | Mod: S$PBB,,,

## 2023-02-06 PROCEDURE — 87389 HIV-1 AG W/HIV-1&-2 AB AG IA: CPT

## 2023-02-06 PROCEDURE — 3072F PR LOW RISK FOR RETINOPATHY: ICD-10-PCS | Mod: CPTII,,,

## 2023-02-06 PROCEDURE — 3074F PR MOST RECENT SYSTOLIC BLOOD PRESSURE < 130 MM HG: ICD-10-PCS | Mod: CPTII,,,

## 2023-02-06 PROCEDURE — 36415 COLL VENOUS BLD VENIPUNCTURE: CPT

## 2023-02-06 PROCEDURE — 4010F PR ACE/ARB THEARPY RXD/TAKEN: ICD-10-PCS | Mod: CPTII,,,

## 2023-02-06 PROCEDURE — 80053 COMPREHEN METABOLIC PANEL: CPT

## 2023-02-06 PROCEDURE — 4010F ACE/ARB THERAPY RXD/TAKEN: CPT | Mod: CPTII,,,

## 2023-02-06 PROCEDURE — 99213 OFFICE O/P EST LOW 20 MIN: CPT | Mod: S$PBB,,,

## 2023-02-06 PROCEDURE — 99215 OFFICE O/P EST HI 40 MIN: CPT | Mod: PBBFAC

## 2023-02-06 PROCEDURE — 81514 NFCT DS BV&VAGINITIS DNA ALG: CPT

## 2023-02-06 PROCEDURE — 3008F PR BODY MASS INDEX (BMI) DOCUMENTED: ICD-10-PCS | Mod: CPTII,,,

## 2023-02-06 PROCEDURE — 81002 URINALYSIS NONAUTO W/O SCOPE: CPT | Mod: PBBFAC

## 2023-02-06 PROCEDURE — 80074 ACUTE HEPATITIS PANEL: CPT

## 2023-02-06 PROCEDURE — 3044F HG A1C LEVEL LT 7.0%: CPT | Mod: CPTII,,,

## 2023-02-06 PROCEDURE — 87591 N.GONORRHOEAE DNA AMP PROB: CPT

## 2023-02-06 PROCEDURE — 3078F PR MOST RECENT DIASTOLIC BLOOD PRESSURE < 80 MM HG: ICD-10-PCS | Mod: CPTII,,,

## 2023-02-06 PROCEDURE — 3072F LOW RISK FOR RETINOPATHY: CPT | Mod: CPTII,,,

## 2023-02-06 PROCEDURE — 3044F PR MOST RECENT HEMOGLOBIN A1C LEVEL <7.0%: ICD-10-PCS | Mod: CPTII,,,

## 2023-02-06 PROCEDURE — 99999 PR PBB SHADOW E&M-EST. PATIENT-LVL V: CPT | Mod: PBBFAC,,,

## 2023-02-06 PROCEDURE — 1159F MED LIST DOCD IN RCRD: CPT | Mod: CPTII,,,

## 2023-02-06 NOTE — PROGRESS NOTES
Subjective:       Patient ID: Yolanda Betts is a 52 y.o. female.    Chief Complaint:  Pelvic Pain and Vaginal Discharge (White and thick )      History of Present Illness  HPI  Pelvic Pain and Discharge    Pt presents with c/o pelvic/vaginal pain and white discharge with odor onset about 2 weeks ago. Pt states that she is sexually active with 3 partners, no barriers used.     Pt is concerned she may have an STD.   Pt states she is having vaginal dryness that causes discomfort during intercourse, mood swings and hot flashes      Patient has a known history of constipation, no bladder issues    Hx of total hysterectomy with BSO      GYN & OB History  No LMP recorded. Patient has had a hysterectomy.   Date of Last Pap: No result found    OB History    Para Term  AB Living   4 4 2 2   2   SAB IAB Ectopic Multiple Live Births           2      # Outcome Date GA Lbr Nirmal/2nd Weight Sex Delivery Anes PTL Lv   4  96 34w0d   F CS-LTranv EPI  DANIELE   3  94 32w0d   F CS-LTranv EPI  DANIELE   2 Term            1 Term                Review of Systems  Review of Systems   Constitutional:  Negative for activity change, appetite change, chills, fatigue and fever.   HENT:  Negative for nasal congestion and mouth sores.    Respiratory:  Negative for cough, shortness of breath and wheezing.    Cardiovascular:  Negative for chest pain.   Gastrointestinal:  Negative for abdominal pain, constipation, diarrhea, nausea and vomiting.   Endocrine: Negative for hair loss and hot flashes.   Genitourinary:  Positive for vaginal pain. Negative for bladder incontinence, decreased libido, dyspareunia, dysuria, frequency, genital sores, pelvic pain, urgency, vaginal discharge, urinary incontinence, postcoital bleeding, vaginal dryness and vaginal odor.   Musculoskeletal:  Negative for back pain.   Integumentary:  Negative for breast mass, nipple discharge, breast skin changes and breast tenderness.    Neurological:  Negative for headaches.   Hematological:  Negative for adenopathy.   Psychiatric/Behavioral:  Negative for depression. The patient is not nervous/anxious.    All other systems reviewed and are negative.  Breast: Negative for breast self exam, lump, mass, mastodynia, nipple discharge, skin changes and tenderness        Objective:    Physical Exam:   Constitutional: She is oriented to person, place, and time. She appears well-developed and well-nourished. No distress.    HENT:   Head: Normocephalic and atraumatic.   Nose: Nose normal.    Eyes: Pupils are equal, round, and reactive to light. Conjunctivae and EOM are normal. Right eye exhibits no discharge. Left eye exhibits no discharge.     Cardiovascular:  Normal rate, regular rhythm and normal heart sounds.      Exam reveals no clubbing, no cyanosis and no edema.        Pulmonary/Chest: Effort normal and breath sounds normal. No respiratory distress. She has no decreased breath sounds. She has no wheezes. She has no rhonchi. She has no rales. She exhibits no tenderness. Right breast exhibits no inverted nipple, no mass, no nipple discharge, no skin change, no tenderness, no bleeding and no swelling. Left breast exhibits no inverted nipple, no mass, no nipple discharge, no skin change, no tenderness, no bleeding and no swelling. Breasts are symmetrical.        Abdominal: Soft. Bowel sounds are normal. She exhibits no distension. There is no abdominal tenderness. There is no rebound and no guarding. Hernia confirmed negative in the right inguinal area and confirmed negative in the left inguinal area.     Genitourinary:    Inguinal canal, right adnexa and left adnexa normal.   Rectum:      No external hemorrhoid.      Pelvic exam was performed with patient supine.   The external female genitalia was normal.   No external genitalia lesions identified,Genitalia hair distrobution normal .   Labial bartholins normal.There is no rash, tenderness, lesion or  injury on the right labia. There is no rash, tenderness, lesion or injury on the left labia. Right adnexum displays no mass, no tenderness and no fullness. Left adnexum displays no mass, no tenderness and no fullness. Vaginal cuff normal.  No erythema,  no vaginal discharge, tenderness or bleeding in the vagina.    No foreign body in the vagina.      No signs of injury in the vagina.   Cervix is absent.Uterus is absent. Normal urethral meatus.Urethral Meatus exhibits: urethral lesion and prolapsedUrethra findings: no urethral mass, no tenderness and no urethral scarringBladder findings: no bladder distention and no bladder tenderness          Musculoskeletal: Normal range of motion and moves all extremeties.      Lymphadenopathy: No inguinal adenopathy noted on the right or left side.    Neurological: She is alert and oriented to person, place, and time.    Skin: Skin is warm and dry. No rash noted. She is not diaphoretic. No cyanosis or erythema. No pallor. Nails show no clubbing.    Psychiatric: She has a normal mood and affect. Her speech is normal and behavior is normal. Judgment and thought content normal.        Assessment:        1. Pelvic pain    2. Screen for STD (sexually transmitted disease)    3. Counseling for hormone replacement therapy    4. Vaginal dryness, menopausal    5. Vaginal atrophy                Plan:      Pelvic pain  -     US Pelvis Comp with Transvag NON-OB (xpd; Future; Expected date: 02/06/2023  -     POCT URINE DIPSTICK WITHOUT MICROSCOPE       Color, UA Milagros    pH, UA 8    WBC, UA negative    Nitrite, UA negative    Protein, POC negative    Glucose, UA normal    Ketones, UA negative    Urobilinogen, UA normal    Bilirubin, POC negative    Blood, UA negative    Clarity, UA Clear    Spec Grav UA 1.015           Screen for STD (sexually transmitted disease)  -     HIV 1/2 Ag/Ab (4th Gen); Future; Expected date: 02/06/2023  -     HEPATITIS PANEL, ACUTE; Future; Expected date:  02/06/2023  -     RPR; Future; Expected date: 02/06/2023  -     C. trachomatis/N. gonorrhoeae by AMP DNA [facility]; Vagina  -     VAGINOSIS SCREEN BY DNA PROBE  -     COMPREHENSIVE METABOLIC PANEL; Future; Expected date: 02/06/2023      Follow up as needed.    Pending labs, patient wants to start on vaginal estrogen as well as PO estrogen. Start at 5 mg    A full discussion of the benefit-risk ratio of hormonal replacement therapy was carried out. Improvement in vasomotor and other climacteric symptoms is discussed, including possible improvements in sleep and mood. Reduction of risk for osteoporosis was explained. We discussed the study data showing increased risk of thrombo-embolic events such as myocardial infarction, stroke and also possibly breast cancer with estrogen replacement, and how this might affect her. The range of side effects such as breast tenderness, weight gain and including possible increases in lifetime risk of breast cancer and possible thrombotic complications was discussed. We also discussed ACOG's recommendation to use hormone replacement therapy for the relief of hot flashes alone and to be on the lowest dose possible for the shortest amount of time.  Alternative such as herbal and soy-based products were reviewed. All of her questions about this therapy were answered.

## 2023-02-06 NOTE — TELEPHONE ENCOUNTER
----- Message from Tania Pfeiffer sent at 2/6/2023  8:02 AM CST -----  Contact: self [phone]  Patient called in and is having a discharge and pian in the pelvic area. Please call back.[phone]. Thanks tpsonia

## 2023-02-07 LAB
HAV IGM SERPL QL IA: NORMAL
HBV CORE IGM SERPL QL IA: NORMAL
HBV SURFACE AG SERPL QL IA: NORMAL
HCV AB SERPL QL IA: NORMAL
HIV 1+2 AB+HIV1 P24 AG SERPL QL IA: NORMAL
RPR SER QL: NORMAL

## 2023-02-08 LAB
C TRACH DNA SPEC QL NAA+PROBE: NOT DETECTED
N GONORRHOEA DNA SPEC QL NAA+PROBE: NOT DETECTED

## 2023-02-10 ENCOUNTER — PATIENT OUTREACH (OUTPATIENT)
Dept: ADMINISTRATIVE | Facility: OTHER | Age: 53
End: 2023-02-10
Payer: MEDICAID

## 2023-02-10 NOTE — PROGRESS NOTES
CHW - Outreach Attempt    Community Health Worker left a voicemail message for 1st attempt to contact patient regarding follow up visit.  Community Health Worker to attempt to contact patient on 2/10/2023.

## 2023-02-13 ENCOUNTER — TELEPHONE (OUTPATIENT)
Dept: ORTHOPEDICS | Facility: CLINIC | Age: 53
End: 2023-02-13
Payer: MEDICAID

## 2023-02-13 NOTE — TELEPHONE ENCOUNTER
LVM for patient to give us a call back or message us via [facility]. -NS    ----- Message from Marty Oliver sent at 2/13/2023  9:58 AM CST -----  Contact: Yolanda Marshall is requesting a call from the nurse regarding questions/concerns. Please give patient a call back at .[phone]

## 2023-02-14 ENCOUNTER — PATIENT OUTREACH (OUTPATIENT)
Dept: ADMINISTRATIVE | Facility: OTHER | Age: 53
End: 2023-02-14
Payer: MEDICAID

## 2023-02-14 LAB
BACTERIAL VAGINOSIS DNA: NORMAL
CANDIDA GLABRATA DNA: NORMAL
CANDIDA KRUSEI DNA: NORMAL
CANDIDA RRNA VAG QL PROBE: NORMAL
T VAGINALIS RRNA GENITAL QL PROBE: NORMAL

## 2023-02-14 NOTE — PROGRESS NOTES
CHW - Outreach Attempt    Community Health Worker left a voicemail message for 2nd attempt to contact patient regarding follow up visit.  Community Health Worker to attempt to contact patient on 2/14/2023.

## 2023-02-16 ENCOUNTER — OFFICE VISIT (OUTPATIENT)
Dept: PODIATRY | Facility: CLINIC | Age: 53
End: 2023-02-16
Payer: MEDICAID

## 2023-02-16 ENCOUNTER — DOCUMENTATION ONLY (OUTPATIENT)
Dept: PSYCHIATRY | Facility: CLINIC | Age: 53
End: 2023-02-16
Payer: MEDICAID

## 2023-02-16 ENCOUNTER — TELEPHONE (OUTPATIENT)
Dept: ORTHOPEDICS | Facility: CLINIC | Age: 53
End: 2023-02-16
Payer: MEDICAID

## 2023-02-16 ENCOUNTER — IMMUNIZATION (OUTPATIENT)
Dept: PHARMACY | Facility: CLINIC | Age: 53
End: 2023-02-16
Payer: MEDICAID

## 2023-02-16 ENCOUNTER — PATIENT OUTREACH (OUTPATIENT)
Dept: ADMINISTRATIVE | Facility: OTHER | Age: 53
End: 2023-02-16
Payer: MEDICAID

## 2023-02-16 ENCOUNTER — TELEPHONE (OUTPATIENT)
Dept: OBSTETRICS AND GYNECOLOGY | Facility: CLINIC | Age: 53
End: 2023-02-16
Payer: MEDICAID

## 2023-02-16 VITALS — BODY MASS INDEX: 41.61 KG/M2 | HEIGHT: 56 IN | WEIGHT: 185 LBS

## 2023-02-16 DIAGNOSIS — M20.42 HAMMER TOES OF BOTH FEET: ICD-10-CM

## 2023-02-16 DIAGNOSIS — E11.42 DIABETIC POLYNEUROPATHY ASSOCIATED WITH TYPE 2 DIABETES MELLITUS: ICD-10-CM

## 2023-02-16 DIAGNOSIS — L84 CORN OR CALLUS: ICD-10-CM

## 2023-02-16 DIAGNOSIS — E11.9 ENCOUNTER FOR COMPREHENSIVE DIABETIC FOOT EXAMINATION, TYPE 2 DIABETES MELLITUS: Primary | ICD-10-CM

## 2023-02-16 DIAGNOSIS — M20.41 HAMMER TOES OF BOTH FEET: ICD-10-CM

## 2023-02-16 PROCEDURE — 99214 OFFICE O/P EST MOD 30 MIN: CPT | Mod: PBBFAC | Performed by: PODIATRIST

## 2023-02-16 PROCEDURE — 3072F LOW RISK FOR RETINOPATHY: CPT | Mod: CPTII,,, | Performed by: PODIATRIST

## 2023-02-16 PROCEDURE — 3044F PR MOST RECENT HEMOGLOBIN A1C LEVEL <7.0%: ICD-10-PCS | Mod: CPTII,,, | Performed by: PODIATRIST

## 2023-02-16 PROCEDURE — 4010F ACE/ARB THERAPY RXD/TAKEN: CPT | Mod: CPTII,,, | Performed by: PODIATRIST

## 2023-02-16 PROCEDURE — 3008F BODY MASS INDEX DOCD: CPT | Mod: CPTII,,, | Performed by: PODIATRIST

## 2023-02-16 PROCEDURE — 99214 OFFICE O/P EST MOD 30 MIN: CPT | Mod: 25,S$PBB,, | Performed by: PODIATRIST

## 2023-02-16 PROCEDURE — 1160F RVW MEDS BY RX/DR IN RCRD: CPT | Mod: CPTII,,, | Performed by: PODIATRIST

## 2023-02-16 PROCEDURE — 99214 PR OFFICE/OUTPT VISIT, EST, LEVL IV, 30-39 MIN: ICD-10-PCS | Mod: 25,S$PBB,, | Performed by: PODIATRIST

## 2023-02-16 PROCEDURE — 99999 PR PBB SHADOW E&M-EST. PATIENT-LVL IV: ICD-10-PCS | Mod: PBBFAC,,, | Performed by: PODIATRIST

## 2023-02-16 PROCEDURE — 3044F HG A1C LEVEL LT 7.0%: CPT | Mod: CPTII,,, | Performed by: PODIATRIST

## 2023-02-16 PROCEDURE — 1160F PR REVIEW ALL MEDS BY PRESCRIBER/CLIN PHARMACIST DOCUMENTED: ICD-10-PCS | Mod: CPTII,,, | Performed by: PODIATRIST

## 2023-02-16 PROCEDURE — 1159F PR MEDICATION LIST DOCUMENTED IN MEDICAL RECORD: ICD-10-PCS | Mod: CPTII,,, | Performed by: PODIATRIST

## 2023-02-16 PROCEDURE — 3008F PR BODY MASS INDEX (BMI) DOCUMENTED: ICD-10-PCS | Mod: CPTII,,, | Performed by: PODIATRIST

## 2023-02-16 PROCEDURE — 3072F PR LOW RISK FOR RETINOPATHY: ICD-10-PCS | Mod: CPTII,,, | Performed by: PODIATRIST

## 2023-02-16 PROCEDURE — 99999 PR PBB SHADOW E&M-EST. PATIENT-LVL IV: CPT | Mod: PBBFAC,,, | Performed by: PODIATRIST

## 2023-02-16 PROCEDURE — 4010F PR ACE/ARB THEARPY RXD/TAKEN: ICD-10-PCS | Mod: CPTII,,, | Performed by: PODIATRIST

## 2023-02-16 PROCEDURE — 1159F MED LIST DOCD IN RCRD: CPT | Mod: CPTII,,, | Performed by: PODIATRIST

## 2023-02-16 NOTE — PROGRESS NOTES
Subjective:     Patient ID: Yolanda Betts is a 52 y.o. female.    Chief Complaint: Diabetic Foot Exam (C/o bilateral numbness and tingling, Diabetic pt, wears tennis shoes with socks, last seen on 02/01/23 with PCP Dr. Sorenson)    Yolanda is a 52 y.o. female who presents to the clinic for evaluation and treatment of high risk feet. Yolanda has a past medical history of Abnormal Pap smear of cervix, Anemia, Anxiety, Arthritis, Asthma (10/11/2016), Bipolar 1 disorder, Diabetes mellitus, type 2, Dyslipidemia associated with type 2 diabetes mellitus (5/13/2019), General anesthetics causing adverse effect in therapeutic use, Genital warts, GERD (gastroesophageal reflux disease), Herpes simplex virus (HSV) infection, Hyperlipidemia, Hypertension, Hypertension complicating diabetes (5/5/2019), Migraine with aura and without status migrainosus, not intractable (3/21/2016), Mild persistent asthma without complication (10/11/2016), Morbid obesity with body mass index (BMI) of 45.0 to 49.9 in adult (8/3/2017), Obstructive sleep apnea, ALEN (obstructive sleep apnea), Schizoaffective disorder, bipolar type (4/25/2019), Seasonal allergic rhinitis due to pollen (5/13/2019), Type 2 diabetes mellitus with hyperglycemia, with long-term current use of insulin (12/21/2017), and Type 2 diabetes mellitus with hyperglycemia, without long-term current use of insulin (12/21/2017). The patient's chief complaint is burning and numbness in feet.  This patient has documented high risk feet requiring routine maintenance secondary to diabetes mellitis and those secondary complications of diabetes, as mentioned..    PCP: Sasha Sorenson MD    Date Last Seen by PCP: 02/01/2023    Current shoe gear:  Affected Foot: Tennis shoes     Unaffected Foot: Tennis shoes    Hemoglobin A1C   Date Value Ref Range Status   01/13/2023 6.3 (H) 4.0 - 5.6 % Final     Comment:     ADA Screening Guidelines:  5.7-6.4%  Consistent with prediabetes  >or=6.5%  Consistent  with diabetes    High levels of fetal hemoglobin interfere with the HbA1C  assay. Heterozygous hemoglobin variants (HbS, HgC, etc)do  not significantly interfere with this assay.   However, presence of multiple variants may affect accuracy.     09/21/2022 6.4 (H) 4.0 - 5.6 % Final     Comment:     ADA Screening Guidelines:  5.7-6.4%  Consistent with prediabetes  >or=6.5%  Consistent with diabetes    High levels of fetal hemoglobin interfere with the HbA1C  assay. Heterozygous hemoglobin variants (HbS, HgC, etc)do  not significantly interfere with this assay.   However, presence of multiple variants may affect accuracy.     03/08/2022 6.2 (H) 4.0 - 5.6 % Final     Comment:     ADA Screening Guidelines:  5.7-6.4%  Consistent with prediabetes  >or=6.5%  Consistent with diabetes    High levels of fetal hemoglobin interfere with the HbA1C  assay. Heterozygous hemoglobin variants (HbS, HgC, etc)do  not significantly interfere with this assay.   However, presence of multiple variants may affect accuracy.             Patient Active Problem List   Diagnosis    Essential hypertension    GERD (gastroesophageal reflux disease)    Menopausal syndrome (hot flashes)    Migraine with aura and without status migrainosus, not intractable    Abnormal ECG    Sleep-related bruxism    Diabetic polyneuropathy associated with type 2 diabetes mellitus    Moderate persistent asthma without complication    Bipolar 1 disorder    Diffusion capacity of lung (dl), decreased    Hypertriglyceridemia    Generalized anxiety disorder    Type 2 diabetes mellitus with hyperglycemia, with long-term current use of insulin    Iron deficiency anemia    Schizoaffective disorder, bipolar type    Vitamin D deficiency    Fibromyalgia    Hypertension complicating diabetes    Dyslipidemia associated with type 2 diabetes mellitus    Seasonal allergic rhinitis due to pollen    Nocturnal hypoxemia due to obesity - WITHOUT ALEN    Obesity hypoventilation syndrome     Hyperaldosteronism    BMI 40.0-44.9, adult    High risk of appointment cancellation or no-show    Irritable bowel syndrome with both constipation and diarrhea    Right shoulder tendonitis    Chronic neck and back pain    Proximal muscle weakness    Poor posture    Snoring    Circadian rhythm disorder    Hypersomnolence disorder    NAFLD (nonalcoholic fatty liver disease)    Bilateral carpal tunnel syndrome    Asthma    Type 2 diabetes mellitus    History of anorexia nervosa    Bipolar disord, crnt epsd depress, severe, w psych features    Sleep problem caused by drug    DM type 2 with diabetic peripheral neuropathy    PLMD (periodic limb movement disorder)    Idiopathic hypersomnolence    Chronic idiopathic constipation    Morbid obesity    Mixed hyperlipidemia    Counseling for hormone replacement therapy       Medication List with Changes/Refills   New Medications    ALBUTEROL (PROVENTIL/VENTOLIN HFA) 90 MCG/ACTUATION INHALER    Inhale 2 puffs into the lungs every 6 hours as needed    AMLODIPINE-VALSARTAN (EXFORGE)  MG PER TABLET    Take 1 tablet by mouth daily    ASPIRIN (ECOTRIN) 81 MG EC TABLET    Take 1 tablet by mouth daily    ATORVASTATIN (LIPITOR) 20 MG TABLET    Take 1 tablet by mouth daily at bedtime    BUDESONIDE-FORMOTEROL 160-4.5 MCG (SYMBICORT) 160-4.5 MCG/ACTUATION HFAA    Inhale 2 puffs into the lungs every 12 hours    CYCLOSPORINE 0.05 % DROP    Place 1 drop into both eyes twice daily    DICLOFENAC SODIUM (VOLTAREN) 1 % GEL    Apply topically to affected areas 4 times daily    DULOXETINE (CYMBALTA) 30 MG CAPSULE    Take 1 capsule by mouth daily    DULOXETINE (CYMBALTA) 60 MG CAPSULE    Take 1 capsule by mouth daily    ERGOCALCIFEROL (ERGOCALCIFEROL) 50,000 UNIT CAP    Take 1 capsule by mouth once weekly on Saturdays    FENOFIBRATE (TRICOR) 145 MG TABLET    Take 1 tablet by mouth daily    FLU VACC [id number] 6MOS UP,PF, 60 MCG (15 MCG X 4)/0.5 ML SYRG    Inject 0.5 mLs into the muscle once. for  "1 dose    FUROSEMIDE (LASIX) 20 MG TABLET    Take 1 tablet by mouth daily    LAMOTRIGINE (LAMICTAL) 100 MG TABLET    Take 1 tablet by mouth daily    LEVOCETIRIZINE (XYZAL) 5 MG TABLET    Take 1 tablet by mouth daily at bedtime    MAGNESIUM OXIDE (MAG-OX) 400 MG (241.3 MG MAGNESIUM) TABLET    Take 1 tablet by mouth daily    OMEPRAZOLE (PRILOSEC) 40 MG CAPSULE    Take 1 capsule by mouth daily    SPIRONOLACTONE (ALDACTONE) 25 MG TABLET    Take 1 tablet by mouth daily    TIZANIDINE (ZANAFLEX) 4 MG TABLET    Take 2 tablets by mouth every 6 hours as needed   Current Medications    ALBUTEROL (PROVENTIL/VENTOLIN HFA) 90 MCG/ACTUATION INHALER    Inhale 2 puffs into the lungs every 6 hours as needed for wheezing    ALPRAZOLAM (XANAX) 0.25 MG TABLET    Take 1 tablet (0.25 mg total) by mouth As instructed for Anxiety (take one tablet 60 minutes before MRI, if still feeling anxious take second tablet just before MRI).    AMLODIPINE-VALSARTAN (EXFORGE)  MG PER TABLET    Take 1 tablet by mouth once daily.    ASPIRIN (ECOTRIN) 81 MG EC TABLET    Take 81 mg by mouth once daily.    ATORVASTATIN (LIPITOR) 20 MG TABLET    Take 1 tablet (20 mg total) by mouth every evening.    BD INSULIN SYRINGE ULTRA-FINE 1/2 ML 30 GAUGE X 1/2" SYRG        BLOOD SUGAR DIAGNOSTIC (ONETOUCH ULTRA TEST) STRP    Check blood glucose 4 times daily as directed and as needed (dispense insurance preferred brand or patient choice)    BLOOD SUGAR DIAGNOSTIC STRP    1 each by Misc.(Non-Drug; Combo Route) route 4 (four) times daily. [facility] Glucose Test Strips    BUDESONIDE-FORMOTEROL 160-4.5 MCG (SYMBICORT) 160-4.5 MCG/ACTUATION HFAA    Inhale 2 puffs into the lungs every 12 (twelve) hours. Controller : Use spacer    BUTALBITAL-ACETAMINOPHEN-CAFFEINE -40 MG (FIORICET, ESGIC) -40 MG PER TABLET    Take by mouth.    BUTALBITAL-ACETAMINOPHEN-CAFFEINE -40 MG (FIORICET, ESGIC) -40 MG PER TABLET    Take 1 tablet by mouth every 4 hours as " needed for headache    CLONAZEPAM (KLONOPIN) 1 MG TABLET    Take 1 tablet (1 mg total) by mouth once daily.    CYCLOSPORINE 0.05 % DROP    Place 1 drop into both eyes 2 (two) times daily.    DULOXETINE (CYMBALTA) 30 MG CAPSULE    Take 1 capsule by mouth every morning    DULOXETINE (CYMBALTA) 60 MG CAPSULE    Take 1 capsule (60 mg total) by mouth once daily.    DULOXETINE (CYMBALTA) 60 MG CAPSULE    Take 1 capsule by mouth daily    DULOXETINE (CYMBALTA) 60 MG CAPSULE    Take 1 capsule by mouth at bedtime    ERGOCALCIFEROL (ERGOCALCIFEROL) 50,000 UNIT CAP    Take 1 capsule twice weekly for 3 weeks then weekly    FENOFIBRATE (TRICOR) 145 MG TABLET    Take 1 tablet (145 mg total) by mouth once daily.    FLASH GLUCOSE SCANNING READER (Oomnitza AMY 2 READER) [facility] [place]    Use as directed to check blood sugar    FLUTICASONE PROPIONATE (FLONASE) 50 MCG/ACTUATION NASAL SPRAY    2 sprays (100 mcg total) by Each Nostril route once daily.    FROVATRIPTAN (FROVA) 2.5 MG TABLET    Take 1 tablet at onset of acute migraine. May take 1 more tablet 2 hours later if needed. (MAX 2 TABLET PER DAY. MAX 4 TABLETS PER WEEK.)    FUROSEMIDE (LASIX) 20 MG TABLET    Take 1 tablet (20 mg total) by mouth once daily.    HEALTHYLAX 17 GRAM PWPK    Take 17 g by mouth 2 (two) times daily.    INSULIN ASPART U-100 (NOVOLOG FLEXPEN U-100 INSULIN) 100 UNIT/ML (3 ML) INPN PEN    Inject 10 Units into the skin 3 (three) times daily before meals. Use based on sliding scale    INSULIN GLARGINE U-300 CONC (TOUJEO MAX U-300 SOLOSTAR) 300 UNIT/ML (3 ML) INSULIN PEN    Inject 92 Units into the skin once daily.    LAMOTRIGINE (LAMICTAL) 100 MG TABLET    Take 1 tablet (100 mg total) by mouth once daily.    LAMOTRIGINE (LAMICTAL) 25 MG TABLET    Take 1 tablet (25 mg total) by mouth every morning for 14 days, THEN 2 tablets (50 mg total) every morning for 14 days.    LANCETS (ONETOUCH DELICA PLUS LANCET) 30 GAUGE MISC    Use as directed 3 times daily    LEVOCETIRIZINE  "(XYZAL) 5 MG TABLET    Take 1 tablet (5 mg total) by mouth every evening.    LIDOCAINE-PRILOCAINE (EMLA) CREAM    Apply topically up to 4 times per day to affected area for pain relief    LURASIDONE (LATUDA) 20 MG TAB TABLET    Take 1 tablet by mouth at bedtime along with 80 mg tablet    LURASIDONE (LATUDA) 60 MG TAB TABLET    Take 1 tablet by mouth daily with 350 calories    LURASIDONE (LATUDA) 80 MG TAB TABLET    Take 1 tablet by mouth at bedtime    MAGNESIUM OXIDE (MAG-OX) 400 MG (241.3 MG MAGNESIUM) TABLET    Take 1 tablet (400 mg total) by mouth once daily.    METFORMIN (GLUCOPHAGE) 1000 MG TABLET    Take 1 tablet (1,000 mg total) by mouth 2 (two) times daily with meals.    METOPROLOL SUCCINATE (TOPROL-XL) 50 MG 24 HR TABLET    Take 1 tablet (50 mg total) by mouth every evening.    MIRTAZAPINE (REMERON) 7.5 MG TAB    Take 7.5 mg by mouth every evening.    NALTREXONE CAPSULE    Take 1 capsule (4.5 mg total) by mouth every evening.    NAPROXEN (NAPROSYN) 500 MG TABLET    Take 1 tablet (500 mg total) by mouth 2 (two) times daily.    OMEPRAZOLE (PRILOSEC) 40 MG CAPSULE    Take 1 capsule (40 mg total) by mouth once daily.    PEN NEEDLE, DIABETIC (BD ULTRA-FINE MINI PEN NEEDLE) 31 GAUGE X 3/16" NDLE    Use 5 a day    PEN NEEDLE, DIABETIC (BD ULTRA-FINE MINI PEN NEEDLE) 31 GAUGE X 3/16" NDLE    Use one needle 5 times a day    PNEUMOC 20-RAY CONJ-DIP CR,PF, (PREVNAR-20, PF,) 0.5 ML SYRG INJECTION    Inject 0.5 mLs into the muscle.    PROMETHAZINE (PHENERGAN) 12.5 MG TAB        PRUCALOPRIDE (MOTEGRITY) 2 MG TAB    Take 1 tablet (2 mg) by mouth once daily.    SEMAGLUTIDE (OZEMPIC) 0.25 MG OR 0.5 MG(2 MG/1.5 ML) PEN INJECTOR    Inject 0.25 mg once weekly for 4 weeks, then increase to 0.5 mg once weekly    SPIRONOLACTONE (ALDACTONE) 25 MG TABLET    Take 1 tablet (25 mg total) by mouth once daily.    SUPREP BOWEL PREP KIT 17.5-3.13-1.6 GRAM SOLR    Use as directed    TIZANIDINE (ZANAFLEX) 4 MG TABLET    Take 2 tablets (8 mg " total) by mouth every 6 (six) hours as needed (pain).       Review of patient's allergies indicates:   Allergen Reactions    Codeine Itching    Hydromorphone Other (See Comments)     Can't wake up for long time if taken  Slow to wake up after surgery after receiving    Aleve [naproxen sodium]     Lyrica [pregabalin] Itching    Motrin [ibuprofen]     Neuromuscular blockers, steroidal Hives     some    Latex, natural rubber Rash    Morphine Rash     itching    Norco [hydrocodone-acetaminophen] Itching, Rash and Hallucinations    Seconal [secobarbital sodium] Rash     itching    Tylox [oxycodone-acetaminophen] Rash       Past Surgical History:   Procedure Laterality Date    abcess removed right back      BELT ABDOMINOPLASTY  1996    BREAST SURGERY Bilateral 1996    Reduction     SECTION      X 2    COLONOSCOPY      COLONOSCOPY N/A 2018    Procedure: colonoscopy for iron deficiency anemia;  Surgeon: Frankie Sanchez MD;  Location: [facility] ENDO;  Service: Endoscopy;  Laterality: N/A;    COLONOSCOPY N/A 2018    Procedure: COLONOSCOPY;  Surgeon: Frankie Sanchez MD;  Location: [facility] ENDO;  Service: Endoscopy;  Laterality: N/A;    EPIDURAL STEROID INJECTION INTO CERVICAL SPINE N/A 2023    Procedure: C6/7 IL ROCAEL RN IV Sedation;  Surgeon: Otto Phillips MD;  Location: [facility] PAIN MGT;  Service: Pain Management;  Laterality: N/A;    HYSTERECTOMY      w/ BSO; hypermenorrhea    MOUTH SURGERY      OOPHORECTOMY      hyst/bso, hypermenorrhea    ROBOT-ASSISTED CHOLECYSTECTOMY USING DA DARI XI N/A 1/3/2020    Procedure: XI ROBOTIC CHOLECYSTECTOMY;  Surgeon: Damir Driscoll MD;  Location: [facility] OR;  Service: General;  Laterality: N/A;    TOTAL REDUCTION MAMMOPLASTY      TUBAL LIGATION         Family History   Problem Relation Age of Onset    Diabetes Mother     Hyperlipidemia Mother     Hypertension Mother     Asthma Mother     COPD Mother     Glaucoma Mother     Thyroid disease Mother     Anesthesia  "problems Mother         "almost had a cardiac arrest" , blood clots    Hypertension Father     Hyperlipidemia Father     Cancer Father         Brain, lung, liver, kidney    Heart disease Maternal Grandmother     Hyperlipidemia Maternal Grandmother     Hypertension Maternal Grandmother     Cataracts Maternal Grandmother     Diabetes Maternal Grandmother     Heart disease Maternal Grandfather     Hyperlipidemia Maternal Grandfather     Hypertension Maternal Grandfather     Glaucoma Maternal Grandfather     Cancer Maternal Grandfather     Cataracts Maternal Grandfather     Macular degeneration Maternal Grandfather     Diabetes Maternal Grandfather     Heart disease Paternal Grandmother     Hyperlipidemia Paternal Grandmother     Hypertension Paternal Grandmother     Cataracts Paternal Grandmother     Heart disease Paternal Grandfather     Hyperlipidemia Paternal Grandfather     Hypertension Paternal Grandfather     Cataracts Paternal Grandfather     Breast cancer Maternal Cousin     Breast cancer Maternal Cousin     Breast cancer Maternal Cousin     Breast cancer Maternal Cousin        Social History     Socioeconomic History    Marital status: Single   Tobacco Use    Smoking status: Never    Smokeless tobacco: Never   Substance and Sexual Activity    Alcohol use: Yes     Alcohol/week: 0.0 standard drinks     Comment: socially  No alcohol 72h prior to sx    Drug use: No    Sexual activity: Yes     Partners: Male     Birth control/protection: Surgical     Comment: john   Social History Narrative    Long-term care nurse     Social Determinants of Health     Financial Resource Strain: High Risk    Difficulty of Paying Living Expenses: Very hard   Food Insecurity: Food Insecurity Present    Worried About Running Out of Food in the Last Year: Sometimes true    Ran Out of Food in the Last Year: Sometimes true   Transportation Needs: No Transportation Needs    Lack of Transportation (Medical): No    Lack of Transportation " "(Non-Medical): No   Physical Activity: Inactive    Days of Exercise per Week: 0 days    Minutes of Exercise per Session: 0 min   Stress: Stress Concern Present    Feeling of Stress : Very much   Social Connections: Moderately Isolated    Frequency of Communication with Friends and Family: More than three times a week    Frequency of Social Gatherings with Friends and Family: More than three times a week    Attends Mosque Services: More than 4 times per year    Active Member of Clubs or Organizations: No    Attends Club or Organization Meetings: Never    Marital Status:    Housing Stability: High Risk    Unable to Pay for Housing in the Last Year: Yes    Number of Places Lived in the Last Year: 1    Unstable Housing in the Last Year: No       Vitals:    02/16/23 1323   Weight: 83.9 kg (185 lb)   Height: 4' 8" (1.422 m)   PainSc: 0-No pain   PainLoc: Foot       Hemoglobin A1C   Date Value Ref Range Status   01/13/2023 6.3 (H) 4.0 - 5.6 % Final     Comment:     ADA Screening Guidelines:  5.7-6.4%  Consistent with prediabetes  >or=6.5%  Consistent with diabetes    High levels of fetal hemoglobin interfere with the HbA1C  assay. Heterozygous hemoglobin variants (HbS, HgC, etc)do  not significantly interfere with this assay.   However, presence of multiple variants may affect accuracy.     09/21/2022 6.4 (H) 4.0 - 5.6 % Final     Comment:     ADA Screening Guidelines:  5.7-6.4%  Consistent with prediabetes  >or=6.5%  Consistent with diabetes    High levels of fetal hemoglobin interfere with the HbA1C  assay. Heterozygous hemoglobin variants (HbS, HgC, etc)do  not significantly interfere with this assay.   However, presence of multiple variants may affect accuracy.     03/08/2022 6.2 (H) 4.0 - 5.6 % Final     Comment:     ADA Screening Guidelines:  5.7-6.4%  Consistent with prediabetes  >or=6.5%  Consistent with diabetes    High levels of fetal hemoglobin interfere with the HbA1C  assay. Heterozygous hemoglobin " variants (HbS, HgC, etc)do  not significantly interfere with this assay.   However, presence of multiple variants may affect accuracy.         Review of Systems   Constitutional:  Negative for chills and fever.   Respiratory:  Negative for shortness of breath.    Cardiovascular:  Negative for chest pain, palpitations, orthopnea, claudication and leg swelling.   Gastrointestinal:  Negative for diarrhea, nausea and vomiting.   Musculoskeletal:  Negative for joint pain.   Skin:  Negative for rash.   Neurological:  Positive for tingling and sensory change.   Psychiatric/Behavioral: Negative.             Objective:   PHYSICAL EXAM: Apperance: Alert and orient in no distress,well developed, and with good attention to grooming and body habits  Patient presents ambulating in tennis shoes.   LOWER EXTREMITY EXAM:  VASCULAR: Dorsalis pedis pulses 2/4 bilateral and Posterior Tibial pulses 2/4 bilateral. Capillary fill time <4 seconds bilateral. No edema observed bilateral. Varicosities absent bilateral. Skin temperature of the lower extremities is warm to warm, proximal to distal. Hair growth WNL bilateral.  DERMATOLOGICAL: No skin rashes, subcutaneous nodules, lesions, or ulcers observed bilateral. Nails 1,2,3,4,5 bilateral normal length and thickness. Webspaces 1,2,3,4 bilateral clean, dry and without evidence of break in skin integrity. Dry skin noted to bilateral heels.   NEUROLOGICAL: Light touch, sharp-dull, proprioception all present and equal bilaterally.  Vibratory sensation diminished at bilateral hallux. Protective sensation absent at toe sites as tested with a Gilmer-Rhoda 5.07 monofilament.   MUSCULOSKELETAL: Muscle strength is 5/5 for foot inverters, everters, plantarflexors, and dorsiflexors. Muscle tone is normal. Ankle joints bilateral shows decreased ROM. bilateral ankle ROM shows greater decrease in dorsiflexion with knee extended. Ankle joint ROM is pain free and without crepitus bilateral.          Assessment:   Encounter for comprehensive diabetic foot examination, type 2 diabetes mellitus    Diabetic polyneuropathy associated with type 2 diabetes mellitus  -     DIABETIC SHOES FOR HOME USE    Hammer toes of both feet  -     DIABETIC SHOES FOR HOME USE    Corn or callus  -     DIABETIC SHOES FOR HOME USE          Plan:   Encounter for comprehensive diabetic foot examination, type 2 diabetes mellitus    Diabetic polyneuropathy associated with type 2 diabetes mellitus  -     DIABETIC SHOES FOR HOME USE    Hammer toes of both feet  -     DIABETIC SHOES FOR HOME USE    Corn or callus  -     DIABETIC SHOES FOR HOME USE      I counseled the patient on her conditions, regarding findings of my examination, my impressions, and usual treatment plan.   This visit spent on counseling and coordination of care.  Appointment spent on education about the diabetic foot, neuropathy, and prevention of limb loss.  Shoe inspection. Diabetic Foot Education. Patient reminded of the importance of good nutrition and blood sugar control to help prevent podiatric complications of diabetes. Patient instructed on proper foot hygeine. We discussed wearing proper shoe gear, daily foot inspections, never walking without protective shoe gear, never putting sharp instruments to feet.    Prescription written for Diabetic shoes and inserts.   Patient  will continue to monitor the areas daily, inspect feet, wear protective shoe gear when ambulatory, moisturizer to maintain skin integrity. Patient reminded of the importance of good nutrition and blood sugar control to help prevent podiatric complications of diabetes.  Patient to return 12 months or sooner if needed.         Alfred Sweeney DPM  [facility]

## 2023-02-16 NOTE — TELEPHONE ENCOUNTER
Patient returned call to clinic regarding results of vaginosis swab done by NP in clinic. Patient swab inconclusive and needs to be recollected. Scheduled patient to return to clinic and recollect sample.

## 2023-02-16 NOTE — TELEPHONE ENCOUNTER
Returned patient's call and let her know that we recommend following up with Dr. Phillips for her neck/back, since she received an injection with him on 1/31 and Dr. Kirkpatrick does not treat the back/spine. Patient stated she would reach out to Dr. Phillips's staff to get a follow-up scheduled. Patient verbalized understanding and was grateful for the call back.

## 2023-02-16 NOTE — TELEPHONE ENCOUNTER
----- Message from Yudy Escobedo sent at 2/16/2023 12:28 PM CST -----  Contact: Yolanda  Type:  Needs Medical Advice    Who Called: Yolanda  Symptoms (please be specific): Back/neck/shoulder pain   How long has patient had these symptoms:  Quite some time  Pharmacy name and phone #:    [facility]  [id number] [place] [address].  [place] [address]  Phone: [phone] l Fax: [phone]  Would the patient rather a call back or a response via [facility]? call  Best Call Back Number: [phone]   Additional Information: Patient reports experiencing pain and request assistance. Please give patient a call back as soon as possible.   Thank you,  GH

## 2023-02-16 NOTE — PROGRESS NOTES
On 02/14/23,  received a message from [facility] staff(Patricia TORRES) regarding the patient's status. Patricia informed KALEB that the patient has voluntarily admitted to inpatient and will return to [facility] upon discharge. SW will remain available.

## 2023-02-16 NOTE — PROGRESS NOTES
CHW - Outreach Attempt    Community Health Worker left a voicemail message for 3rd attempt to contact patient regarding follow up visit.  Community Health Worker to attempt to contact patient on 2/16/2023.

## 2023-02-20 ENCOUNTER — OFFICE VISIT (OUTPATIENT)
Dept: OBSTETRICS AND GYNECOLOGY | Facility: CLINIC | Age: 53
End: 2023-02-20
Payer: MEDICAID

## 2023-02-20 VITALS
BODY MASS INDEX: 41.46 KG/M2 | SYSTOLIC BLOOD PRESSURE: 112 MMHG | WEIGHT: 184.31 LBS | DIASTOLIC BLOOD PRESSURE: 68 MMHG | HEIGHT: 56 IN

## 2023-02-20 DIAGNOSIS — N95.2 VAGINAL ATROPHY: Primary | ICD-10-CM

## 2023-02-20 PROCEDURE — 99213 OFFICE O/P EST LOW 20 MIN: CPT | Mod: S$PBB,,,

## 2023-02-20 PROCEDURE — 1159F MED LIST DOCD IN RCRD: CPT | Mod: CPTII,,,

## 2023-02-20 PROCEDURE — 4010F PR ACE/ARB THEARPY RXD/TAKEN: ICD-10-PCS | Mod: CPTII,,,

## 2023-02-20 PROCEDURE — 3074F PR MOST RECENT SYSTOLIC BLOOD PRESSURE < 130 MM HG: ICD-10-PCS | Mod: CPTII,,,

## 2023-02-20 PROCEDURE — 1159F PR MEDICATION LIST DOCUMENTED IN MEDICAL RECORD: ICD-10-PCS | Mod: CPTII,,,

## 2023-02-20 PROCEDURE — 99214 OFFICE O/P EST MOD 30 MIN: CPT | Mod: PBBFAC

## 2023-02-20 PROCEDURE — 3044F PR MOST RECENT HEMOGLOBIN A1C LEVEL <7.0%: ICD-10-PCS | Mod: CPTII,,,

## 2023-02-20 PROCEDURE — 3008F PR BODY MASS INDEX (BMI) DOCUMENTED: ICD-10-PCS | Mod: CPTII,,,

## 2023-02-20 PROCEDURE — 3072F LOW RISK FOR RETINOPATHY: CPT | Mod: CPTII,,,

## 2023-02-20 PROCEDURE — 4010F ACE/ARB THERAPY RXD/TAKEN: CPT | Mod: CPTII,,,

## 2023-02-20 PROCEDURE — 99999 PR PBB SHADOW E&M-EST. PATIENT-LVL IV: CPT | Mod: PBBFAC,,,

## 2023-02-20 PROCEDURE — 3074F SYST BP LT 130 MM HG: CPT | Mod: CPTII,,,

## 2023-02-20 PROCEDURE — 3044F HG A1C LEVEL LT 7.0%: CPT | Mod: CPTII,,,

## 2023-02-20 PROCEDURE — 81514 NFCT DS BV&VAGINITIS DNA ALG: CPT

## 2023-02-20 PROCEDURE — 3078F PR MOST RECENT DIASTOLIC BLOOD PRESSURE < 80 MM HG: ICD-10-PCS | Mod: CPTII,,,

## 2023-02-20 PROCEDURE — 99999 PR PBB SHADOW E&M-EST. PATIENT-LVL IV: ICD-10-PCS | Mod: PBBFAC,,,

## 2023-02-20 PROCEDURE — 3008F BODY MASS INDEX DOCD: CPT | Mod: CPTII,,,

## 2023-02-20 PROCEDURE — 3072F PR LOW RISK FOR RETINOPATHY: ICD-10-PCS | Mod: CPTII,,,

## 2023-02-20 PROCEDURE — 99213 PR OFFICE/OUTPT VISIT, EST, LEVL III, 20-29 MIN: ICD-10-PCS | Mod: S$PBB,,,

## 2023-02-20 PROCEDURE — 3078F DIAST BP <80 MM HG: CPT | Mod: CPTII,,,

## 2023-02-20 RX ORDER — ESTRADIOL 0.1 MG/G
1 CREAM VAGINAL DAILY
Qty: 42.5 G | Refills: 1 | Status: SHIPPED | OUTPATIENT
Start: 2023-02-20 | End: 2023-02-20

## 2023-02-20 RX ORDER — ESTRADIOL 0.1 MG/G
1 CREAM VAGINAL DAILY
Qty: 42.5 G | Refills: 1 | Status: SHIPPED | OUTPATIENT
Start: 2023-02-20 | End: 2023-06-14 | Stop reason: SDUPTHER

## 2023-02-20 NOTE — PROGRESS NOTES
Subjective:       Patient ID: Yolanda Betts is a 52 y.o. female.    Chief Complaint:  affirm recollect      History of Present Illness  HPI  Lab Re-Collection    Patient presents today for recollection of Affirm d/t invalid lab results. Pt has no complaints today.     Pt requests estrace discussed at last visit be Rx'd to [facility], will consider estradiol Rx when cleared of NAFLD by GI    GYN & OB History  No LMP recorded. Patient has had a hysterectomy.   Date of Last Pap: No result found    OB History    Para Term  AB Living   4 4 2 2   2   SAB IAB Ectopic Multiple Live Births           2      # Outcome Date GA Lbr Nirmal/2nd Weight Sex Delivery Anes PTL Lv   4  96 34w0d   F CS-LTranv EPI  DANIELE   3  94 32w0d   F CS-LTranv EPI  DANIELE   2 Term            1 Term                Review of Systems  Review of Systems   Constitutional:  Negative for chills, fatigue and fever.   Genitourinary:  Positive for vaginal dryness. Negative for dysmenorrhea, frequency, pelvic pain, urgency, vaginal discharge, vaginal pain, urinary incontinence and vaginal odor.   All other systems reviewed and are negative.        Objective:    Physical Exam:   Constitutional: She is oriented to person, place, and time. She appears well-developed and well-nourished. No distress.    HENT:   Head: Normocephalic and atraumatic.    Eyes: Pupils are equal, round, and reactive to light. Conjunctivae and EOM are normal.     Cardiovascular:  Normal rate.             Pulmonary/Chest: Effort normal.        Abdominal: Soft. She exhibits no distension. There is no abdominal tenderness. There is no rebound and no guarding. Hernia confirmed negative in the right inguinal area and confirmed negative in the left inguinal area.     Genitourinary:    Inguinal canal, right adnexa and left adnexa normal.   Rectum:      No external hemorrhoid.      Pelvic exam was performed with patient supine.   The external female  genitalia was normal.   No external genitalia lesions identified,Genitalia hair distrobution normal .   Labial bartholins normal.There is no rash, tenderness, lesion or injury on the right labia. There is no rash, tenderness, lesion or injury on the left labia. Right adnexum displays no mass, no tenderness and no fullness. Left adnexum displays no mass, no tenderness and no fullness. No erythema,  no vaginal discharge, tenderness or bleeding in the vagina.    No foreign body in the vagina.      No signs of injury in the vagina.   Vaginal atrophy noted. Cervix is absent.Uterus is absent. Normal urethral meatus.Bladder findings: no bladder distention and no bladder tenderness          Musculoskeletal: Normal range of motion and moves all extremeties.      Lymphadenopathy: No inguinal adenopathy noted on the right or left side.    Neurological: She is alert and oriented to person, place, and time.    Skin: Skin is warm and dry. No rash noted. She is not diaphoretic. No erythema. No pallor.    Psychiatric: She has a normal mood and affect. Her behavior is normal. Judgment and thought content normal.        Assessment:        1. Vaginal atrophy                  Plan:      Vaginal atrophy  -     estradioL (ESTRACE) 0.01 % (0.1 mg/gram) vaginal cream; Place 1 g vaginally once daily.  Dispense: 42.5 g; Refill: 1  -     VAGINOSIS SCREEN BY DNA PROBE        Pt requests estrace discussed at last visit be Rx'd to [facility], will consider estradiol Rx when cleared of NAFLD by GI      Follow up as needed.

## 2023-02-21 ENCOUNTER — HOSPITAL ENCOUNTER (OUTPATIENT)
Dept: RADIOLOGY | Facility: HOSPITAL | Age: 53
Discharge: HOME | End: 2023-02-21
Payer: MEDICAID

## 2023-02-21 DIAGNOSIS — R10.2 PELVIC PAIN: ICD-10-CM

## 2023-02-21 PROCEDURE — 76856 US EXAM PELVIC COMPLETE: CPT | Mod: TC

## 2023-02-21 PROCEDURE — 76856 US EXAM PELVIC COMPLETE: CPT | Mod: 26,,, | Performed by: RADIOLOGY

## 2023-02-21 PROCEDURE — 76856 US PELVIS COMPLETE NON OB: ICD-10-PCS | Mod: 26,,, | Performed by: RADIOLOGY

## 2023-02-22 ENCOUNTER — PATIENT OUTREACH (OUTPATIENT)
Dept: ADMINISTRATIVE | Facility: OTHER | Age: 53
End: 2023-02-22
Payer: MEDICAID

## 2023-02-22 NOTE — PROGRESS NOTES
CHW - Case Closure    This Community Health Worker spoke to patient via telephone today.   Pt reported. She was able to get a appointment with the [facility] assistance.  Pt denied any additional needs at this time and agrees with episode closure at this time.  Provided patient with Community Health Worker's contact information and encouraged her to contact this Community Health Worker if additional needs arise.

## 2023-02-23 ENCOUNTER — OFFICE VISIT (OUTPATIENT)
Dept: PRIMARY CARE CLINIC | Facility: CLINIC | Age: 53
End: 2023-02-23
Payer: MEDICAID

## 2023-02-23 VITALS
BODY MASS INDEX: 41.47 KG/M2 | DIASTOLIC BLOOD PRESSURE: 65 MMHG | OXYGEN SATURATION: 96 % | HEART RATE: 85 BPM | TEMPERATURE: 99 F | WEIGHT: 184.38 LBS | SYSTOLIC BLOOD PRESSURE: 120 MMHG | HEIGHT: 56 IN

## 2023-02-23 DIAGNOSIS — E78.2 MIXED HYPERLIPIDEMIA: ICD-10-CM

## 2023-02-23 DIAGNOSIS — E11.42 DM TYPE 2 WITH DIABETIC PERIPHERAL NEUROPATHY: Primary | ICD-10-CM

## 2023-02-23 DIAGNOSIS — I10 ESSENTIAL HYPERTENSION: ICD-10-CM

## 2023-02-23 DIAGNOSIS — K76.0 FATTY LIVER: ICD-10-CM

## 2023-02-23 DIAGNOSIS — Z13.31 POSITIVE DEPRESSION SCREENING: ICD-10-CM

## 2023-02-23 DIAGNOSIS — F31.9 BIPOLAR 1 DISORDER: Chronic | ICD-10-CM

## 2023-02-23 LAB
BACTERIAL VAGINOSIS DNA: NEGATIVE
CANDIDA GLABRATA DNA: POSITIVE
CANDIDA KRUSEI DNA: NEGATIVE
CANDIDA RRNA VAG QL PROBE: NEGATIVE
T VAGINALIS RRNA GENITAL QL PROBE: NEGATIVE

## 2023-02-23 PROCEDURE — 99214 PR OFFICE/OUTPT VISIT, EST, LEVL IV, 30-39 MIN: ICD-10-PCS | Mod: S$PBB,,, | Performed by: FAMILY MEDICINE

## 2023-02-23 PROCEDURE — 3044F HG A1C LEVEL LT 7.0%: CPT | Mod: CPTII,,, | Performed by: FAMILY MEDICINE

## 2023-02-23 PROCEDURE — 4010F ACE/ARB THERAPY RXD/TAKEN: CPT | Mod: CPTII,,, | Performed by: FAMILY MEDICINE

## 2023-02-23 PROCEDURE — 3044F PR MOST RECENT HEMOGLOBIN A1C LEVEL <7.0%: ICD-10-PCS | Mod: CPTII,,, | Performed by: FAMILY MEDICINE

## 2023-02-23 PROCEDURE — 3072F PR LOW RISK FOR RETINOPATHY: ICD-10-PCS | Mod: CPTII,,, | Performed by: FAMILY MEDICINE

## 2023-02-23 PROCEDURE — 1159F MED LIST DOCD IN RCRD: CPT | Mod: CPTII,,, | Performed by: FAMILY MEDICINE

## 2023-02-23 PROCEDURE — 3072F LOW RISK FOR RETINOPATHY: CPT | Mod: CPTII,,, | Performed by: FAMILY MEDICINE

## 2023-02-23 PROCEDURE — 3074F SYST BP LT 130 MM HG: CPT | Mod: CPTII,,, | Performed by: FAMILY MEDICINE

## 2023-02-23 PROCEDURE — 4010F PR ACE/ARB THEARPY RXD/TAKEN: ICD-10-PCS | Mod: CPTII,,, | Performed by: FAMILY MEDICINE

## 2023-02-23 PROCEDURE — 99215 OFFICE O/P EST HI 40 MIN: CPT | Mod: PBBFAC,PN | Performed by: FAMILY MEDICINE

## 2023-02-23 PROCEDURE — 99214 OFFICE O/P EST MOD 30 MIN: CPT | Mod: S$PBB,,, | Performed by: FAMILY MEDICINE

## 2023-02-23 PROCEDURE — 3074F PR MOST RECENT SYSTOLIC BLOOD PRESSURE < 130 MM HG: ICD-10-PCS | Mod: CPTII,,, | Performed by: FAMILY MEDICINE

## 2023-02-23 PROCEDURE — 99999 PR PBB SHADOW E&M-EST. PATIENT-LVL V: CPT | Mod: PBBFAC,,, | Performed by: FAMILY MEDICINE

## 2023-02-23 PROCEDURE — 3008F PR BODY MASS INDEX (BMI) DOCUMENTED: ICD-10-PCS | Mod: CPTII,,, | Performed by: FAMILY MEDICINE

## 2023-02-23 PROCEDURE — 99999 PR PBB SHADOW E&M-EST. PATIENT-LVL V: ICD-10-PCS | Mod: PBBFAC,,, | Performed by: FAMILY MEDICINE

## 2023-02-23 PROCEDURE — 1159F PR MEDICATION LIST DOCUMENTED IN MEDICAL RECORD: ICD-10-PCS | Mod: CPTII,,, | Performed by: FAMILY MEDICINE

## 2023-02-23 PROCEDURE — 3078F PR MOST RECENT DIASTOLIC BLOOD PRESSURE < 80 MM HG: ICD-10-PCS | Mod: CPTII,,, | Performed by: FAMILY MEDICINE

## 2023-02-23 PROCEDURE — 3008F BODY MASS INDEX DOCD: CPT | Mod: CPTII,,, | Performed by: FAMILY MEDICINE

## 2023-02-23 PROCEDURE — 3078F DIAST BP <80 MM HG: CPT | Mod: CPTII,,, | Performed by: FAMILY MEDICINE

## 2023-02-23 RX ORDER — DULOXETIN HYDROCHLORIDE 30 MG/1
CAPSULE, DELAYED RELEASE ORAL
Qty: 30 CAPSULE | Refills: 0
Start: 2023-02-23 | End: 2023-03-25
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
## 2023-02-23 NOTE — PROGRESS NOTES
I have reviewed the positive depression score which warrants active treatment with psychotherapy and/or medications. She is receiving IOP therapy with [facility]

## 2023-02-24 ENCOUNTER — OFFICE VISIT (OUTPATIENT)
Dept: PSYCHIATRY | Facility: CLINIC | Age: 53
End: 2023-02-24
Payer: MEDICAID

## 2023-02-24 DIAGNOSIS — F31.30 BIPOLAR I DISORDER, MOST RECENT EPISODE (OR CURRENT) DEPRESSED: Primary | ICD-10-CM

## 2023-02-24 DIAGNOSIS — F41.1 GENERALIZED ANXIETY DISORDER: Chronic | ICD-10-CM

## 2023-02-24 DIAGNOSIS — M79.7 FIBROMYALGIA: Chronic | ICD-10-CM

## 2023-02-24 PROCEDURE — 3044F PR MOST RECENT HEMOGLOBIN A1C LEVEL <7.0%: ICD-10-PCS | Mod: HP,HB,CPTII,95 | Performed by: PSYCHOLOGIST

## 2023-02-24 PROCEDURE — 3072F LOW RISK FOR RETINOPATHY: CPT | Mod: HP,HB,CPTII,95 | Performed by: PSYCHOLOGIST

## 2023-02-24 PROCEDURE — 4010F ACE/ARB THERAPY RXD/TAKEN: CPT | Mod: HP,HB,CPTII,95 | Performed by: PSYCHOLOGIST

## 2023-02-24 PROCEDURE — 99214 PR OFFICE/OUTPT VISIT, EST, LEVL IV, 30-39 MIN: ICD-10-PCS | Mod: HP,HB,95, | Performed by: PSYCHOLOGIST

## 2023-02-24 PROCEDURE — 4010F PR ACE/ARB THEARPY RXD/TAKEN: ICD-10-PCS | Mod: HP,HB,CPTII,95 | Performed by: PSYCHOLOGIST

## 2023-02-24 PROCEDURE — 3072F PR LOW RISK FOR RETINOPATHY: ICD-10-PCS | Mod: HP,HB,CPTII,95 | Performed by: PSYCHOLOGIST

## 2023-02-24 PROCEDURE — 99214 OFFICE O/P EST MOD 30 MIN: CPT | Mod: HP,HB,95, | Performed by: PSYCHOLOGIST

## 2023-02-24 PROCEDURE — 1159F MED LIST DOCD IN RCRD: CPT | Mod: HP,HB,CPTII,95 | Performed by: PSYCHOLOGIST

## 2023-02-24 PROCEDURE — 1159F PR MEDICATION LIST DOCUMENTED IN MEDICAL RECORD: ICD-10-PCS | Mod: HP,HB,CPTII,95 | Performed by: PSYCHOLOGIST

## 2023-02-24 PROCEDURE — 3044F HG A1C LEVEL LT 7.0%: CPT | Mod: HP,HB,CPTII,95 | Performed by: PSYCHOLOGIST

## 2023-02-24 NOTE — PATIENT INSTRUCTIONS

## 2023-02-24 NOTE — PROGRESS NOTES
"Outpatient Psychiatry Follow-Up Visit    2/24/2023    Timeframe: Corona Virus Outbreak     The patient location is: Patient's home/ Patient reported that his/her location at the time of this visit was in the [facility]     Visit type: Virtual visit with synchronous audio and video--We had to use audio via speakerphone and continue the video through the Mychart due to technical difficulties and audio quality. We ended up completing with audio only.    Each patient to whom he or she provides medical services by telehealth is: (1) informed of the relationship between the medical psychologist and patient and the respective role of any other health care provider with respect to management of the patient; and (2) notified that he or she may decline to receive medical services by telehealth and may withdraw from such care at any time.    I also informed patient of the following:   Mariam Yuong, PhD, MPAP:  [place] medical license number: [id number]    My contact info:  [facility] 2nd Floor  [address] [place] [address]   Ph: [phone]    If technology issues, call office phone: Ph: [phone]  If crisis: Dial 911 or go to nearest Emergency Room (ER)  If questions related to privacy practices: contact [facility] Information Department: [phone]    Chief Complaint:  Yolanda Betts is a 52 y.o. female who presents today for follow-up of mood and anxiety.       Impressions/Plan from last visit: Yolanda attended her virtual visit. Her last visit was in May. She has started a new position closer to her home--no longer in [place]; had bought a house and is "having issues with it." She reported that she has mice; has a ceiling that "caved in;" she had to put in a hot water heater. Her homeowners ins only paid a portion. She is behind in her mortgage and car note--"I can't seem to get back on track." She has been more depressed. She is at [address] location " "with her job--a coworker leaves to go to the bank and stays gone for 3 hours; takes 2 hour lunches, etc. 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 She feels overwhelmed and burned out. She does not have a social life--she had been seeing someone, but "he became unreliable." That ended in October. She does not have a self-care routine right now. As we talked about this, she started crying. She reported having panic attacks--difficulty breathing, chest pain, feels like someone sitting on her chest--happening daily now "every day all day." They started about two months ago but have increased.  We discussed her multiple psychosocial factors contributing to her increase in depression at this time.  This includes a new job position in location, buying a new house, problems with the new house, a break-up in her relationship, increased physical pain, financial problems.  She had previously been seeing MsJohnnie Argelia for therapy, but MsJohnnie Argelia is no longer working within the [facility].  We discussed a referral for her to see a new therapist, as well as case management to consider community-based options for additional care.    Plan--continue Cymbalta 60 mg; Latuda to 60 mg; Klonopin 1 mg prn; add Lamictal 25 mg daily for 2 weeks, then 50 mg daily for 2 weeks, then 100 mg daily    Interval History and Content of Current Session: Yolanda attended her virtual visit. She is still in [facility]. She was admitted last weekend--medicines were changed. She goes Monday, Wed, and Fri from 10:30 to 1:30 pm. She thinks that the medicines are helping some. She believes that she will be in that program at least until April. We discussed that she would continue with them so she does not have multiple people changing her medicines. She will keep us updated so we can get her scheduled.    Plan--continue Cymbalta 60 mg hs, 30 mg am; Latuda 60 mg; Lamictal 200 mg; " Remeron 7.5 mg hs; Xanax XR 2 mg (all per pt report)     since December 2022.          Prior medicines: Prozac, Wellbutrin, Paxil, Lamictal, Lexapro, Celexa, Cymbalta, Remeron, Abilify, Seroquel, Risperdal, Restoril, Ambien, trazodone, Xanax, clonazepam    GAD7 2/24/2023 1/4/2023 9/28/2022   1. Feeling nervous, anxious, or on edge? 1 3 1   2. Not being able to stop or control worrying? 1 3 1   3. Worrying too much about different things? 1 3 1   4. Trouble relaxing? 1 3 1   5. Being so restless that it is hard to sit still? 2 3 1   6. Becoming easily annoyed or irritable? 1 3 1   7. Feeling afraid as if something awful might happen? 1 3 1   JOANN-7 Score 8 21 7      0-4 = Minimal anxiety  5-9 = Mild anxiety  10-14 = Moderate anxiety  15-21 = Severe anxiety       Review of Systems   PSYCHIATRIC: Pertinant items are noted in the narrative.    Past Medical, Family and Social History: The patient's past medical, family and social history have been reviewed and updated as appropriate within the electronic medical record - see encounter notes.      Current Outpatient Medications:     albuterol (PROVENTIL/VENTOLIN HFA) 90 mcg/actuation inhaler, Inhale 2 puffs into the lungs every 6 hours as needed for wheezing, Disp: 8.5 g, Rfl: 3    albuterol (PROVENTIL/VENTOLIN HFA) 90 mcg/actuation inhaler, Inhale 2 puffs into the lungs every 6 hours as needed, Disp: 8.5 g, Rfl: 0    ALPRAZolam (XANAX XR) 2 MG Tb24, Take 1 tablet by mouth at bedtime, Disp: 30 tablet, Rfl: 0    amlodipine-valsartan (EXFORGE)  mg per tablet, Take 1 tablet by mouth once daily., Disp: 90 tablet, Rfl: 3    amlodipine-valsartan (EXFORGE)  mg per tablet, Take 1 tablet by mouth daily, Disp: 30 tablet, Rfl: 0    aspirin (ECOTRIN) 81 MG EC tablet, Take 81 mg by mouth once daily., Disp: , Rfl:     aspirin (ECOTRIN) 81 MG EC tablet, Take 1 tablet by mouth daily, Disp: 30 tablet, Rfl: 0    atorvastatin (LIPITOR) 20 MG tablet, Take 1 tablet (20 mg total)  "by mouth every evening., Disp: 90 tablet, Rfl: 3    atorvastatin (LIPITOR) 20 MG tablet, Take 1 tablet by mouth daily at bedtime, Disp: 30 tablet, Rfl: 0    BD INSULIN SYRINGE ULTRA-FINE 1/2 mL 30 gauge x 1/2" Syrg, , Disp: , Rfl: 0    blood sugar diagnostic (ONETOUCH ULTRA TEST) Strp, Check blood glucose 4 times daily as directed and as needed (dispense insurance preferred brand or patient choice), Disp: 200 each, Rfl: 5    blood sugar diagnostic Strp, 1 each by Misc.(Non-Drug; Combo Route) route 4 (four) times daily. [facility] Glucose Test Strips, Disp: 400 strip, Rfl: 3    budesonide-formoterol 160-4.5 mcg (SYMBICORT) 160-4.5 mcg/actuation HFAA, Inhale 2 puffs into the lungs every 12 (twelve) hours. Controller : Use spacer, Disp: 10.2 g, Rfl: 6    budesonide-formoterol 160-4.5 mcg (SYMBICORT) 160-4.5 mcg/actuation HFAA, Inhale 2 puffs into the lungs every 12 hours, Disp: 10.2 g, Rfl: 0    butalbital-acetaminophen-caffeine -40 mg (FIORICET, ESGIC) -40 mg per tablet, Take by mouth., Disp: , Rfl:     butalbital-acetaminophen-caffeine -40 mg (FIORICET, ESGIC) -40 mg per tablet, Take 1 tablet by mouth every 4 hours as needed for headache, Disp: 30 tablet, Rfl: 3    cycloSPORINE 0.05 % Drop, Place 1 drop into both eyes 2 (two) times daily., Disp: 5.5 mL, Rfl: 11    cycloSPORINE 0.05 % Drop, Place 1 drop into both eyes twice daily, Disp: 5.5 mL, Rfl: 0    diclofenac sodium (VOLTAREN) 1 % Gel, Apply topically to affected areas 4 times daily, Disp: 100 g, Rfl: 0    DULoxetine (CYMBALTA) 30 MG capsule, Take 1 capsule by mouth every morning, Disp: 30 capsule, Rfl: 0    DULoxetine (CYMBALTA) 60 MG capsule, Take 1 capsule (60 mg total) by mouth once daily., Disp: 30 capsule, Rfl: 2    ergocalciferol (ERGOCALCIFEROL) 50,000 unit Cap, Take 1 capsule twice weekly for 3 weeks then weekly, Disp: 12 capsule, Rfl: 3    estradioL (ESTRACE) 0.01 % (0.1 mg/gram) vaginal cream, Place 1 g vaginally once daily., " Disp: 42.5 g, Rfl: 1    fenofibrate (TRICOR) 145 MG tablet, Take 1 tablet (145 mg total) by mouth once daily., Disp: 90 tablet, Rfl: 3    fenofibrate (TRICOR) 145 MG tablet, Take 1 tablet by mouth daily, Disp: 30 tablet, Rfl: 0    flash glucose scanning reader (FREESTYLE AMY 2 READER) [facility], Use as directed to check blood sugar, Disp: 1 each, Rfl: 1    fluticasone propionate (FLONASE) 50 mcg/actuation nasal spray, 2 sprays (100 mcg total) by Each Nostril route once daily., Disp: 16 g, Rfl: 0    frovatriptan (FROVA) 2.5 MG tablet, Take 1 tablet at onset of acute migraine. May take 1 more tablet 2 hours later if needed. (MAX 2 TABLET PER DAY. MAX 4 TABLETS PER WEEK.), Disp: 9 tablet, Rfl: 1    furosemide (LASIX) 20 MG tablet, Take 1 tablet (20 mg total) by mouth once daily., Disp: 90 tablet, Rfl: 3    furosemide (LASIX) 20 MG tablet, Take 1 tablet by mouth daily, Disp: 30 tablet, Rfl: 0    HEALTHYLAX 17 gram PwPk, Take 17 g by mouth 2 (two) times daily., Disp: , Rfl:     insulin aspart U-100 (NOVOLOG FLEXPEN U-100 INSULIN) 100 unit/mL (3 mL) InPn pen, Inject 10 Units into the skin 3 (three) times daily before meals. Use based on sliding scale, Disp: 15 mL, Rfl: 3    insulin glargine U-300 conc (TOUJEO MAX U-300 SOLOSTAR) 300 unit/mL (3 mL) insulin pen, Inject 92 Units into the skin once daily., Disp: 4 pen, Rfl: 5    lamoTRIgine (LAMICTAL) 200 MG tablet, Take 1 tablet by mouth at bedtime, Disp: 30 tablet, Rfl: 0    lancets (ONETOUCH DELICA PLUS LANCET) 30 gauge Misc, Use as directed 3 times daily, Disp: 100 each, Rfl: 11    levocetirizine (XYZAL) 5 MG tablet, Take 1 tablet (5 mg total) by mouth every evening., Disp: 30 tablet, Rfl: 11    levocetirizine (XYZAL) 5 MG tablet, Take 1 tablet by mouth daily at bedtime, Disp: 30 tablet, Rfl: 0    LIDOcaine-prilocaine (EMLA) cream, Apply topically up to 4 times per day to affected area for pain relief, Disp: 60 g, Rfl: 0    lurasidone (LATUDA) 60 mg Tab tablet, Take 1 tablet  "by mouth daily with 350 calories, Disp: 30 tablet, Rfl: 2    magnesium oxide (MAG-OX) 400 mg (241.3 mg magnesium) tablet, Take 1 tablet (400 mg total) by mouth once daily., Disp: 90 tablet, Rfl: 3    magnesium oxide (MAG-OX) 400 mg (241.3 mg magnesium) tablet, Take 1 tablet by mouth daily, Disp: 30 tablet, Rfl: 0    metFORMIN (GLUCOPHAGE) 1000 MG tablet, Take 1 tablet (1,000 mg total) by mouth 2 (two) times daily with meals., Disp: 180 tablet, Rfl: 0    metoprolol succinate (TOPROL-XL) 50 MG 24 hr tablet, Take 1 tablet (50 mg total) by mouth every evening., Disp: 90 tablet, Rfl: 3    mirtazapine (REMERON) 7.5 MG Tab, Take 7.5 mg by mouth every evening., Disp: , Rfl:     naltrexone capsule, Take 1 capsule (4.5 mg total) by mouth every evening., Disp: 90 capsule, Rfl: 2    naproxen (NAPROSYN) 500 MG tablet, Take 1 tablet (500 mg total) by mouth 2 (two) times daily., Disp: 60 tablet, Rfl: 1    omeprazole (PRILOSEC) 40 MG capsule, Take 1 capsule (40 mg total) by mouth once daily., Disp: 30 capsule, Rfl: 11    omeprazole (PRILOSEC) 40 MG capsule, Take 1 capsule by mouth daily, Disp: 30 capsule, Rfl: 0    pen needle, diabetic (BD ULTRA-FINE MINI PEN NEEDLE) 31 gauge x 3/16" Ndle, Use 5 a day, Disp: 200 each, Rfl: 3    pen needle, diabetic (BD ULTRA-FINE MINI PEN NEEDLE) 31 gauge x 3/16" Ndle, Use one needle 5 times a day, Disp: 200 each, Rfl: 11    pneumoc 20-tatiana conj-dip cr,PF, (PREVNAR-20, PF,) 0.5 mL Syrg injection, Inject 0.5 mLs into the muscle., Disp: 0.5 mL, Rfl: 0    promethazine (PHENERGAN) 12.5 MG Tab, , Disp: , Rfl:     prucalopride (MOTEGRITY) 2 mg Tab, Take 1 tablet (2 mg) by mouth once daily., Disp: 30 tablet, Rfl: 11    semaglutide (OZEMPIC) 0.25 mg or 0.5 mg(2 mg/1.5 mL) pen injector, Inject 0.25 mg once weekly for 4 weeks, then increase to 0.5 mg once weekly, Disp: 1 pen, Rfl: 5    spironolactone (ALDACTONE) 25 MG tablet, Take 1 tablet (25 mg total) by mouth once daily., Disp: 90 tablet, Rfl: 3    SUPREP " BOWEL PREP KIT 17.5-3.13-1.6 gram SolR, Use as directed, Disp: 354 mL, Rfl: 0    tiZANidine (ZANAFLEX) 4 MG tablet, Take 2 tablets by mouth every 6 hours as needed, Disp: 240 tablet, Rfl: 0    Compliance: yes    Side effects: see above    Risk Parameters:  Patient reports no suicidal ideation  Patient reports no homicidal ideation  Patient reports no self-injurious behavior  Patient reports no violent behavior    Exam (detailed: at least 9 elements; comprehensive: all 15 elements)   Constitutional  Vitals:  Most recent vital signs were reviewed.   Last 3 sets of Vitals    Vitals - 1 value per visit 2/20/2023 2/23/2023 2/23/2023   SYSTOLIC 112 - 120   DIASTOLIC 68 - 65   Pulse - - 85   Temp - - 98.8   Resp - - -   SPO2 - - 96   Weight (lb) 184.3 - 184.4   Weight (kg) 83.6 - 83.643   Height 56 - 56   BMI (Calculated) 41.3 - 41.4   VISIT REPORT - - -   Pain Score  - 8 -   Some recent data might be hidden          General:  age appropriate, casually dressed, neatly groomed, hair in night cap     Musculoskeletal  Muscle Strength/Tone:  no tremor, no tic   Gait & Station:  video visit     Psychiatric  Speech:  no latency; no press   Behavior: wnl   Mood & Affect:  depressed  congruent and appropriate   Thought Process:  normal and logical   Associations:  intact   Thought Content:  normal, no suicidality, no homicidality, delusions, or paranoia   Insight:  has awareness of illness   Judgement: behavior is adequate to circumstances   Orientation:  grossly intact   Memory: intact for content of interview   Language: grossly intact   Attention Span & Concentration:  Grossly intact   Fund of Knowledge:  intact and appropriate to age and level of education     Assessment and Diagnosis   Status/Progress: Based on the examination today, the patient's problem(s) is/are improved and inadequately controlled.  New problems have been presented today.   Co-morbidities are complicating management of the primary condition.  There are no  "active rule-out diagnoses for this patient at this time.     General Impression:     Encounter Diagnoses   Name Primary?    Bipolar I disorder, most recent episode (or current) depressed Yes    Generalized anxiety disorder     Fibromyalgia          Intervention/Counseling/Treatment Plan   Medication Management: Discussed risks, benefits, and alternatives to treatment plan documented above with patient. I answered all patient questions related to this plan, and patient expressed understanding and agreement.   continue Cymbalta 60 mg hs, 30 mg am; Latuda 60 mg; Lamictal 200 mg; Remeron 7.5 mg hs; Xanax XR 2 mg  Continue IOP        Medication List with Changes/Refills   Current Medications    ALBUTEROL (PROVENTIL/VENTOLIN HFA) 90 MCG/ACTUATION INHALER    Inhale 2 puffs into the lungs every 6 hours as needed for wheezing    ALBUTEROL (PROVENTIL/VENTOLIN HFA) 90 MCG/ACTUATION INHALER    Inhale 2 puffs into the lungs every 6 hours as needed    ALPRAZOLAM (XANAX XR) 2 MG TB24    Take 1 tablet by mouth at bedtime    AMLODIPINE-VALSARTAN (EXFORGE)  MG PER TABLET    Take 1 tablet by mouth once daily.    AMLODIPINE-VALSARTAN (EXFORGE)  MG PER TABLET    Take 1 tablet by mouth daily    ASPIRIN (ECOTRIN) 81 MG EC TABLET    Take 81 mg by mouth once daily.    ASPIRIN (ECOTRIN) 81 MG EC TABLET    Take 1 tablet by mouth daily    ATORVASTATIN (LIPITOR) 20 MG TABLET    Take 1 tablet (20 mg total) by mouth every evening.    ATORVASTATIN (LIPITOR) 20 MG TABLET    Take 1 tablet by mouth daily at bedtime    BD INSULIN SYRINGE ULTRA-FINE 1/2 ML 30 GAUGE X 1/2" SYRG        BLOOD SUGAR DIAGNOSTIC (ONETOUCH ULTRA TEST) STRP    Check blood glucose 4 times daily as directed and as needed (dispense insurance preferred brand or patient choice)    BLOOD SUGAR DIAGNOSTIC STRP    1 each by Misc.(Non-Drug; Combo Route) route 4 (four) times daily. [facility] Glucose Test Strips    BUDESONIDE-FORMOTEROL 160-4.5 MCG (SYMBICORT) 160-4.5 " MCG/ACTUATION HFAA    Inhale 2 puffs into the lungs every 12 (twelve) hours. Controller : Use spacer    BUDESONIDE-FORMOTEROL 160-4.5 MCG (SYMBICORT) 160-4.5 MCG/ACTUATION HFAA    Inhale 2 puffs into the lungs every 12 hours    BUTALBITAL-ACETAMINOPHEN-CAFFEINE -40 MG (FIORICET, ESGIC) -40 MG PER TABLET    Take by mouth.    BUTALBITAL-ACETAMINOPHEN-CAFFEINE -40 MG (FIORICET, ESGIC) -40 MG PER TABLET    Take 1 tablet by mouth every 4 hours as needed for headache    CYCLOSPORINE 0.05 % DROP    Place 1 drop into both eyes 2 (two) times daily.    CYCLOSPORINE 0.05 % DROP    Place 1 drop into both eyes twice daily    DICLOFENAC SODIUM (VOLTAREN) 1 % GEL    Apply topically to affected areas 4 times daily    DULOXETINE (CYMBALTA) 30 MG CAPSULE    Take 1 capsule by mouth every morning    DULOXETINE (CYMBALTA) 60 MG CAPSULE    Take 1 capsule (60 mg total) by mouth once daily.    ERGOCALCIFEROL (ERGOCALCIFEROL) 50,000 UNIT CAP    Take 1 capsule twice weekly for 3 weeks then weekly    ESTRADIOL (ESTRACE) 0.01 % (0.1 MG/GRAM) VAGINAL CREAM    Place 1 g vaginally once daily.    FENOFIBRATE (TRICOR) 145 MG TABLET    Take 1 tablet (145 mg total) by mouth once daily.    FENOFIBRATE (TRICOR) 145 MG TABLET    Take 1 tablet by mouth daily    FLASH GLUCOSE SCANNING READER (FREESTYLE AMY 2 READER) [facility]    Use as directed to check blood sugar    FLUTICASONE PROPIONATE (FLONASE) 50 MCG/ACTUATION NASAL SPRAY    2 sprays (100 mcg total) by Each Nostril route once daily.    FROVATRIPTAN (FROVA) 2.5 MG TABLET    Take 1 tablet at onset of acute migraine. May take 1 more tablet 2 hours later if needed. (MAX 2 TABLET PER DAY. MAX 4 TABLETS PER WEEK.)    FUROSEMIDE (LASIX) 20 MG TABLET    Take 1 tablet (20 mg total) by mouth once daily.    FUROSEMIDE (LASIX) 20 MG TABLET    Take 1 tablet by mouth daily    HEALTHYLAX 17 GRAM PWPK    Take 17 g by mouth 2 (two) times daily.    INSULIN ASPART U-100 (NOVOLOG FLEXPEN U-100  "INSULIN) 100 UNIT/ML (3 ML) INPN PEN    Inject 10 Units into the skin 3 (three) times daily before meals. Use based on sliding scale    INSULIN GLARGINE U-300 CONC (TOUJEO MAX U-300 SOLOSTAR) 300 UNIT/ML (3 ML) INSULIN PEN    Inject 92 Units into the skin once daily.    LAMOTRIGINE (LAMICTAL) 200 MG TABLET    Take 1 tablet by mouth at bedtime    LANCETS (ONETOUCH DELICA PLUS LANCET) 30 GAUGE MISC    Use as directed 3 times daily    LEVOCETIRIZINE (XYZAL) 5 MG TABLET    Take 1 tablet (5 mg total) by mouth every evening.    LEVOCETIRIZINE (XYZAL) 5 MG TABLET    Take 1 tablet by mouth daily at bedtime    LIDOCAINE-PRILOCAINE (EMLA) CREAM    Apply topically up to 4 times per day to affected area for pain relief    LURASIDONE (LATUDA) 60 MG TAB TABLET    Take 1 tablet by mouth daily with 350 calories    MAGNESIUM OXIDE (MAG-OX) 400 MG (241.3 MG MAGNESIUM) TABLET    Take 1 tablet (400 mg total) by mouth once daily.    MAGNESIUM OXIDE (MAG-OX) 400 MG (241.3 MG MAGNESIUM) TABLET    Take 1 tablet by mouth daily    METFORMIN (GLUCOPHAGE) 1000 MG TABLET    Take 1 tablet (1,000 mg total) by mouth 2 (two) times daily with meals.    METOPROLOL SUCCINATE (TOPROL-XL) 50 MG 24 HR TABLET    Take 1 tablet (50 mg total) by mouth every evening.    MIRTAZAPINE (REMERON) 7.5 MG TAB    Take 7.5 mg by mouth every evening.    NALTREXONE CAPSULE    Take 1 capsule (4.5 mg total) by mouth every evening.    NAPROXEN (NAPROSYN) 500 MG TABLET    Take 1 tablet (500 mg total) by mouth 2 (two) times daily.    OMEPRAZOLE (PRILOSEC) 40 MG CAPSULE    Take 1 capsule (40 mg total) by mouth once daily.    OMEPRAZOLE (PRILOSEC) 40 MG CAPSULE    Take 1 capsule by mouth daily    PEN NEEDLE, DIABETIC (BD ULTRA-FINE MINI PEN NEEDLE) 31 GAUGE X 3/16" NDLE    Use 5 a day    PEN NEEDLE, DIABETIC (BD ULTRA-FINE MINI PEN NEEDLE) 31 GAUGE X 3/16" NDLE    Use one needle 5 times a day    PNEUMOC 20-RAY CONJ-DIP CR,PF, (PREVNAR-20, PF,) 0.5 ML SYRG INJECTION    Inject " 0.5 mLs into the muscle.    PROMETHAZINE (PHENERGAN) 12.5 MG TAB        PRUCALOPRIDE (MOTEGRITY) 2 MG TAB    Take 1 tablet (2 mg) by mouth once daily.    SEMAGLUTIDE (OZEMPIC) 0.25 MG OR 0.5 MG(2 MG/1.5 ML) PEN INJECTOR    Inject 0.25 mg once weekly for 4 weeks, then increase to 0.5 mg once weekly    SPIRONOLACTONE (ALDACTONE) 25 MG TABLET    Take 1 tablet (25 mg total) by mouth once daily.    SUPREP BOWEL PREP KIT 17.5-3.13-1.6 GRAM SOLR    Use as directed    TIZANIDINE (ZANAFLEX) 4 MG TABLET    Take 2 tablets by mouth every 6 hours as needed   Discontinued Medications    ALPRAZOLAM (XANAX) 0.25 MG TABLET    Take 1 tablet (0.25 mg total) by mouth As instructed for Anxiety (take one tablet 60 minutes before MRI, if still feeling anxious take second tablet just before MRI).    DULOXETINE (CYMBALTA) 60 MG CAPSULE    Take 1 capsule by mouth daily    LAMOTRIGINE (LAMICTAL) 100 MG TABLET    Take 1 tablet (100 mg total) by mouth once daily.    LAMOTRIGINE (LAMICTAL) 25 MG TABLET    Take 1 tablet (25 mg total) by mouth every morning for 14 days, THEN 2 tablets (50 mg total) every morning for 14 days.    LURASIDONE (LATUDA) 20 MG TAB TABLET    Take 1 tablet by mouth daily at bedtime. Take with 80 mg tablet for total daily dose of 100 mg.    LURASIDONE (LATUDA) 80 MG TAB TABLET    Take 1 tablet by mouth at bedtime        Return to Clinic: 3 months, after discharge      Time spent with pt including note preparation: 16 minutes       Mariam Young, PhD, MP  Advanced Practice Medical Psychologist  [facility]  [address]  [place] [address]  [phone]   [phone] fax

## 2023-02-27 ENCOUNTER — TELEPHONE (OUTPATIENT)
Dept: PAIN MEDICINE | Facility: CLINIC | Age: 53
End: 2023-02-27
Payer: MEDICAID

## 2023-02-27 ENCOUNTER — TELEPHONE (OUTPATIENT)
Dept: PSYCHIATRY | Facility: CLINIC | Age: 53
End: 2023-02-27
Payer: MEDICAID

## 2023-02-27 ENCOUNTER — TELEPHONE (OUTPATIENT)
Dept: RHEUMATOLOGY | Facility: CLINIC | Age: 53
End: 2023-02-27
Payer: MEDICAID

## 2023-02-27 NOTE — TELEPHONE ENCOUNTER
----- Message from Yudy Escobedo sent at 2/27/2023 10:08 AM CST -----  Contact: Yolanda  Type:  Sooner Apoointment Request    Caller is requesting a sooner appointment. Caller will not accept being placed on the waitlist and is requesting a message be sent to doctor.  Name of Caller:Yolanda  When is the first available appointment?unknown  Symptoms:pain in neck and back  Would the patient rather a call back or a response via [facility]? call  Best Call Back Number:[phone]   Additional Information: Patient reports experiencing pain following procedure and request to schedule an appointment sooner than next available.   Thank you,  GH

## 2023-02-27 NOTE — TELEPHONE ENCOUNTER
----- Message from Yudy Escobedo sent at 2/27/2023 10:07 AM CST -----  Contact: Yolanda  Type:  Sooner Apoointment Request    Caller is requesting a sooner appointment. Caller will not accept being placed on the waitlist and is requesting a message be sent to doctor.  Name of Caller:Yolanda  When is the first available appointment?scheduled 3/10/23  Symptoms:Follow-up visit  Would the patient rather a call back or a response via [facility]? call  Best Call Back Number:[phone]    Additional Information: Patient request to reschedule visit for another date. Please give patient a call back when possible.  Thank you,  GH

## 2023-02-27 NOTE — TELEPHONE ENCOUNTER
Notified Pt she is already scheduled for 3/1/23 with Bernice Womack PA-C. Also notified Bernice Womack PA-C  and Dr. Phillips work closely together for continuity of care. All questions answered.

## 2023-02-27 NOTE — TELEPHONE ENCOUNTER
----- Message from Nabil Mckeon MD sent at 6/25/2021  4:22 PM CDT -----  PLEASE FIND OUT IF THE PATIENT DID OBTAIN ALL OF THE MEDICATIONS AS WE HAD DISCUSSED YESTERDAY.  PLEASE ENCOURAGE HIM TO HAVE A KETOGENIC PROTEIN BAR WITH 1 MEAL EVERY DAY.  HE SHOULD NOT LET HIS MOTHER-IN-LAW GET INTO THIS.   ----- Message from Yudy Escobedo sent at 2/27/2023 10:07 AM CST -----  Contact: Yolanda  Type:  Sooner Apoointment Request    Caller is requesting a sooner appointment. Caller will not accept being placed on the waitlist and is requesting a message be sent to doctor.  Name of Caller:Yolanda  When is the first available appointment?scheduled 3/10/23  Symptoms:Therapy  Would the patient rather a call back or a response via [facility]? call  Best Call Back Number:[phone]    Additional Information: Patient request to reschedule visit for another date. Please give patient a call back when possible.  Thank you,  GH

## 2023-03-01 ENCOUNTER — OFFICE VISIT (OUTPATIENT)
Dept: OBSTETRICS AND GYNECOLOGY | Facility: CLINIC | Age: 53
End: 2023-03-01
Payer: MEDICAID

## 2023-03-01 VITALS
DIASTOLIC BLOOD PRESSURE: 62 MMHG | BODY MASS INDEX: 40.87 KG/M2 | WEIGHT: 181.69 LBS | SYSTOLIC BLOOD PRESSURE: 115 MMHG | HEIGHT: 56 IN

## 2023-03-01 DIAGNOSIS — Z01.419 WELL WOMAN EXAM WITH ROUTINE GYNECOLOGICAL EXAM: Primary | ICD-10-CM

## 2023-03-01 DIAGNOSIS — Z12.31 ENCOUNTER FOR SCREENING MAMMOGRAM FOR BREAST CANCER: ICD-10-CM

## 2023-03-01 PROCEDURE — 3044F PR MOST RECENT HEMOGLOBIN A1C LEVEL <7.0%: ICD-10-PCS | Mod: CPTII,,,

## 2023-03-01 PROCEDURE — 99999 PR PBB SHADOW E&M-EST. PATIENT-LVL IV: ICD-10-PCS | Mod: PBBFAC,,,

## 2023-03-01 PROCEDURE — 99999 PR PBB SHADOW E&M-EST. PATIENT-LVL IV: CPT | Mod: PBBFAC,,,

## 2023-03-01 PROCEDURE — 3044F HG A1C LEVEL LT 7.0%: CPT | Mod: CPTII,,,

## 2023-03-01 PROCEDURE — 3072F PR LOW RISK FOR RETINOPATHY: ICD-10-PCS | Mod: CPTII,,,

## 2023-03-01 PROCEDURE — 3074F SYST BP LT 130 MM HG: CPT | Mod: CPTII,,,

## 2023-03-01 PROCEDURE — 3008F PR BODY MASS INDEX (BMI) DOCUMENTED: ICD-10-PCS | Mod: CPTII,,,

## 2023-03-01 PROCEDURE — 4010F ACE/ARB THERAPY RXD/TAKEN: CPT | Mod: CPTII,,,

## 2023-03-01 PROCEDURE — 1159F PR MEDICATION LIST DOCUMENTED IN MEDICAL RECORD: ICD-10-PCS | Mod: CPTII,,,

## 2023-03-01 PROCEDURE — 3074F PR MOST RECENT SYSTOLIC BLOOD PRESSURE < 130 MM HG: ICD-10-PCS | Mod: CPTII,,,

## 2023-03-01 PROCEDURE — 3008F BODY MASS INDEX DOCD: CPT | Mod: CPTII,,,

## 2023-03-01 PROCEDURE — 4010F PR ACE/ARB THEARPY RXD/TAKEN: ICD-10-PCS | Mod: CPTII,,,

## 2023-03-01 PROCEDURE — 3078F PR MOST RECENT DIASTOLIC BLOOD PRESSURE < 80 MM HG: ICD-10-PCS | Mod: CPTII,,,

## 2023-03-01 PROCEDURE — 1159F MED LIST DOCD IN RCRD: CPT | Mod: CPTII,,,

## 2023-03-01 PROCEDURE — 3078F DIAST BP <80 MM HG: CPT | Mod: CPTII,,,

## 2023-03-01 PROCEDURE — 3072F LOW RISK FOR RETINOPATHY: CPT | Mod: CPTII,,,

## 2023-03-01 PROCEDURE — 99396 PREV VISIT EST AGE 40-64: CPT | Mod: S$PBB,,,

## 2023-03-01 PROCEDURE — 99396 PR PREVENTIVE VISIT,EST,40-64: ICD-10-PCS | Mod: S$PBB,,,

## 2023-03-01 PROCEDURE — 99214 OFFICE O/P EST MOD 30 MIN: CPT | Mod: PBBFAC

## 2023-03-01 NOTE — PROGRESS NOTES
"  Subjective:       Patient ID: Yolanda Betts is a 52 y.o. female.    Chief Complaint:  Well Woman      History of Present Illness  HPI  Annual Exam-Postmenopausal  Patient presents for annual exam. The patient has no complaints today. The patient is sexually active. GYN screening history: last pap: approximate date "years ago" and was normal and pt had TH and BSO for benign reasons and last mammogram: approximate date 24 and was normal and scheduled next scan today . The patient is not taking hormone replacement therapy. Patient denies post-menopausal vaginal bleeding. The patient wears seatbelts: yes. The patient participates in regular exercise:  due to chronic pain . Has the patient ever been transfused or tattooed?: no. The patient reports that there is not domestic violence in her life.      Also reviewed pelvic US results with patient today. Negative scan, PCP has liver scan scheduled soon, to find source of abdominal pain.     GYN & OB History  No LMP recorded. Patient has had a hysterectomy.   Date of Last Pap: No result found    OB History    Para Term  AB Living   4 4 2 2   2   SAB IAB Ectopic Multiple Live Births           2      # Outcome Date GA Lbr Nirmal/2nd Weight Sex Delivery Anes PTL Lv   4  96 34w0d   F CS-LTranv EPI  DANIELE   3  94 32w0d   F CS-LTranv EPI  DANIELE   2 Term            1 Term                Review of Systems  Review of Systems   Constitutional:  Negative for activity change, appetite change, chills, fatigue and fever.   HENT:  Negative for nasal congestion and mouth sores.    Respiratory:  Negative for cough, shortness of breath and wheezing.    Cardiovascular:  Negative for chest pain.   Gastrointestinal:  Negative for abdominal pain, constipation, diarrhea, nausea and vomiting.   Endocrine: Negative for hair loss and hot flashes.   Genitourinary:  Negative for bladder incontinence, decreased libido, dysmenorrhea, dyspareunia, dysuria, " frequency, genital sores, hematuria, hot flashes, menorrhagia, menstrual problem, pelvic pain, urgency, vaginal discharge, vaginal pain, urinary incontinence, postcoital bleeding, vaginal dryness and vaginal odor.   Musculoskeletal:  Negative for back pain.   Integumentary:  Negative for breast mass, nipple discharge, breast skin changes and breast tenderness.   Neurological:  Negative for headaches.   Hematological:  Negative for adenopathy.   Psychiatric/Behavioral:  Negative for depression and sleep disturbance. The patient is not nervous/anxious.    All other systems reviewed and are negative.  Breast: Positive for breast self exam.Negative for lump, mass, mastodynia, nipple discharge, skin changes and tenderness        Objective:    Physical Exam:   Constitutional: She is oriented to person, place, and time. She appears well-developed and well-nourished. No distress.    HENT:   Head: Normocephalic and atraumatic.   Nose: Nose normal.    Eyes: Pupils are equal, round, and reactive to light. Conjunctivae and EOM are normal. Right eye exhibits no discharge. Left eye exhibits no discharge.     Cardiovascular:  Normal rate, regular rhythm and normal heart sounds.      Exam reveals no clubbing, no cyanosis and no edema.        Pulmonary/Chest: Effort normal and breath sounds normal. No respiratory distress. She has no decreased breath sounds. She has no wheezes. She has no rhonchi. She has no rales. She exhibits no tenderness. Right breast exhibits augmentation (bilateral breast reduction scars). Right breast exhibits no inverted nipple, no mass, no nipple discharge, no skin change, no tenderness, no bleeding and no swelling. Left breast exhibits augmentation. Left breast exhibits no inverted nipple, no mass, no nipple discharge, no skin change, no tenderness, no bleeding and no swelling. Breasts are symmetrical.        Abdominal: Soft and protuberant. Bowel sounds are normal. She exhibits no distension. There is no  abdominal tenderness. There is no rebound and no guarding. Hernia confirmed negative in the right inguinal area and confirmed negative in the left inguinal area.     Genitourinary:    Inguinal canal, right adnexa and left adnexa normal.   Rectum:      No external hemorrhoid.      Pelvic exam was performed with patient supine.   The external female genitalia was normal.   No external genitalia lesions identified,Genitalia hair distrobution normal .   Labial bartholins normal.There is no rash, tenderness, lesion or injury on the right labia. There is no rash, tenderness, lesion or injury on the left labia. Right adnexum displays no mass, no tenderness and no fullness. Left adnexum displays no mass, no tenderness and no fullness. Vaginal cuff normal.  No erythema,  no vaginal discharge, tenderness or bleeding in the vagina.    No foreign body in the vagina.      No signs of injury in the vagina.   Vaginal atrophy noted. Cervix is absent.Uterus is absent. Normal urethral meatus.Bladder findings: no bladder distention and no bladder tenderness          Musculoskeletal: Normal range of motion and moves all extremeties.      Lymphadenopathy: No inguinal adenopathy noted on the right or left side.    Neurological: She is alert and oriented to person, place, and time.    Skin: Skin is warm and dry. No rash noted. She is not diaphoretic. No cyanosis or erythema. No pallor. Nails show no clubbing.    Psychiatric: She has a normal mood and affect. Her speech is normal and behavior is normal. Judgment and thought content normal.        Assessment:        1. Well woman exam with routine gynecological exam    2. Encounter for screening mammogram for breast cancer                Plan:      Well woman exam with routine gynecological exam    Counseled pt on recommendations for Calcium and Vit D supplementation.  Pt no longer needs pap smears.  Needs a pelvic exam q 1-2 years.  Advised on annual mammogram.      Encounter for screening  mammogram for breast cancer  -     Mammo Digital Screening Bilat w/ Ranel; Future; Expected date: 03/01/2023        Follow up in about 1 year (around 3/1/2024).

## 2023-03-03 ENCOUNTER — HOSPITAL ENCOUNTER (OUTPATIENT)
Dept: RADIOLOGY | Facility: HOSPITAL | Age: 53
Discharge: HOME | End: 2023-03-03
Attending: FAMILY MEDICINE
Payer: MEDICAID

## 2023-03-03 DIAGNOSIS — K76.0 FATTY LIVER: ICD-10-CM

## 2023-03-03 PROCEDURE — 76705 ECHO EXAM OF ABDOMEN: CPT | Mod: 26,,, | Performed by: RADIOLOGY

## 2023-03-03 PROCEDURE — 76705 ECHO EXAM OF ABDOMEN: CPT | Mod: TC

## 2023-03-03 PROCEDURE — 76705 US ABDOMEN LIMITED_LIVER: ICD-10-PCS | Mod: 26,,, | Performed by: RADIOLOGY

## 2023-03-07 NOTE — PROGRESS NOTES
Abnormal lab result,   Your US shows that you have Nonalcoholic Fatty liver.  Loosing weight, Vit E and Ozempic has been shown to help with this condition.    Let us know if you have any question.

## 2023-03-08 ENCOUNTER — OFFICE VISIT (OUTPATIENT)
Dept: DIABETES | Facility: CLINIC | Age: 53
End: 2023-03-08
Payer: MEDICAID

## 2023-03-08 ENCOUNTER — TELEPHONE (OUTPATIENT)
Dept: GASTROENTEROLOGY | Facility: CLINIC | Age: 53
End: 2023-03-08
Payer: MEDICAID

## 2023-03-08 ENCOUNTER — TELEPHONE (OUTPATIENT)
Dept: PREADMISSION TESTING | Facility: HOSPITAL | Age: 53
End: 2023-03-08
Payer: MEDICAID

## 2023-03-08 DIAGNOSIS — E11.65 TYPE 2 DIABETES MELLITUS WITH HYPERGLYCEMIA, WITH LONG-TERM CURRENT USE OF INSULIN: Primary | ICD-10-CM

## 2023-03-08 DIAGNOSIS — Z79.4 TYPE 2 DIABETES MELLITUS WITH HYPERGLYCEMIA, WITH LONG-TERM CURRENT USE OF INSULIN: Primary | ICD-10-CM

## 2023-03-08 PROCEDURE — 3072F LOW RISK FOR RETINOPATHY: CPT | Mod: CPTII,95,, | Performed by: NURSE PRACTITIONER

## 2023-03-08 PROCEDURE — 4010F ACE/ARB THERAPY RXD/TAKEN: CPT | Mod: CPTII,95,, | Performed by: NURSE PRACTITIONER

## 2023-03-08 PROCEDURE — 3072F PR LOW RISK FOR RETINOPATHY: ICD-10-PCS | Mod: CPTII,95,, | Performed by: NURSE PRACTITIONER

## 2023-03-08 PROCEDURE — 4010F PR ACE/ARB THEARPY RXD/TAKEN: ICD-10-PCS | Mod: CPTII,95,, | Performed by: NURSE PRACTITIONER

## 2023-03-08 PROCEDURE — 3044F PR MOST RECENT HEMOGLOBIN A1C LEVEL <7.0%: ICD-10-PCS | Mod: CPTII,95,, | Performed by: NURSE PRACTITIONER

## 2023-03-08 PROCEDURE — 99213 PR OFFICE/OUTPT VISIT, EST, LEVL III, 20-29 MIN: ICD-10-PCS | Mod: 95,,, | Performed by: NURSE PRACTITIONER

## 2023-03-08 PROCEDURE — 99213 OFFICE O/P EST LOW 20 MIN: CPT | Mod: 95,,, | Performed by: NURSE PRACTITIONER

## 2023-03-08 PROCEDURE — 3044F HG A1C LEVEL LT 7.0%: CPT | Mod: CPTII,95,, | Performed by: NURSE PRACTITIONER

## 2023-03-08 RX ORDER — SEMAGLUTIDE 1.34 MG/ML
1 INJECTION, SOLUTION SUBCUTANEOUS
Qty: 1 PEN | Refills: 5 | Status: SHIPPED | OUTPATIENT
Start: 2023-03-08 | End: 2023-04-21

## 2023-03-08 NOTE — TELEPHONE ENCOUNTER
Returned call to pt who had questions concerning prior auth for upcoming procedure. Pt was given the number to Patient Financial Service Department  [phone] or [phone]

## 2023-03-08 NOTE — TELEPHONE ENCOUNTER
----- Message from Perla Issa sent at 3/8/2023 12:11 PM CST -----  Contact: marj  Patient is calling to speak with the nurse regarding PA. Reports needing a PA, stats changing insurances. Alyson give the patient a call back at [phone]  Thanks jennifer

## 2023-03-08 NOTE — TELEPHONE ENCOUNTER
----- Message from Yudy Escobedo sent at 3/8/2023  8:26 AM CST -----  Contact: Yolanda  Patient is calling to speak with someone regarding procedure. Patient reports scheduled for an upcoming colonoscopy and request to be informed procedure instructions. Please give patient a call back at [phone] when possible.  Thank you,  GH

## 2023-03-08 NOTE — PROGRESS NOTES
Subjective:         Patient ID: Yolanda Betts is a 52 y.o. female.  Patient's current PCP is Sasha Sorenson MD.       Chief Complaint: No chief complaint on file.      HPI  Yolanda Betts is a 52 y.o. Black or  female presenting for a follow up for diabetes. Patient has been diagnosed with diabetes for > 10 years.     Complications related to diabetes:  HTN, Hyperlipidemia, peripheral neuropathy; denies Pancreatitis; denies Gastroparesis; denies DKA; denies Hx/family Hx of MEN2/MTC; reports Frequent UTIs/yeast infections; Bariatric surgery 6/1/21.     Past failed treatment include:  Jardiance- multiple yeast infections; Victoza - GI upset    Blood glucose testing is performed regularly, reports fasting BG , no hypoglycemia, CGM - No    Compliance:The patient reports medication and diet compliance most of the time.       The patient location is: [place], [place]  The chief complaint leading to consultation is: DM follow up    Visit type: audiovisual    Face to Face time with patient: 20 minutes of total time spent on the encounter, which includes face to face time and non-face to face time preparing to see the patient (eg, review of tests), Obtaining and/or reviewing separately obtained history, Documenting clinical information in the electronic or other health record, Independently interpreting results (not separately reported) and communicating results to the patient/family/caregiver, or Care coordination (not separately reported). Each patient to whom he or she provides medical services by telemedicine is:  (1) informed of the relationship between the physician and patient and the respective role of any other health care provider with respect to management of the patient; and (2) notified that he or she may decline to receive medical services by telemedicine and may withdraw from such care at any time.         //   , There is no height or weight on file to calculate BMI.  Her blood sugar  in clinic today is:   Lab Results   Component Value Date    POCGLU 269 (A) 11/11/2019       Labs reviewed and are noted below.  Her most recent A1C is:  Lab Results   Component Value Date    HGBA1C 6.3 (H) 01/13/2023    HGBA1C 6.4 (H) 09/21/2022    HGBA1C 6.2 (H) 03/08/2022     Lab Results   Component Value Date    CPEPTIDE 4.56 02/26/2018     Lab Results   Component Value Date    GLUTAMICACID 0.00 02/26/2018     Glucose   Date Value Ref Range Status   02/06/2023 60 (L) 70 - 110 mg/dL Final     Anion Gap   Date Value Ref Range Status   02/06/2023 11 8 - 16 mmol/L Final     eGFR if    Date Value Ref Range Status   03/08/2022 >60.0 >60 mL/min/1.73 m^2 Final     eGFR if non    Date Value Ref Range Status   03/08/2022 >60.0 >60 mL/min/1.73 m^2 Final     Comment:     Calculation used to obtain the estimated glomerular filtration  rate (eGFR) is the CKD-EPI equation.          CURRENT DM MEDICATIONS:     Diabetes Medications               insulin aspart U-100 (NOVOLOG FLEXPEN U-100 INSULIN) 100 unit/mL (3 mL) InPn pen Inject 10 Units into the skin 3 (three) times daily before meals. Use based on sliding scale Not needed    insulin glargine U-300 conc (TOUJEO MAX U-300 SOLOSTAR) 300 unit/mL (3 mL) insulin pen Inject 92 Units into the skin once daily.    metFORMIN (GLUCOPHAGE) 1000 MG tablet TAKE 1 TABLET(1000 MG) BY MOUTH TWICE DAILY WITH MEALS    semaglutide (OZEMPIC) 1 mg/dose (2 mg/1.5 mL) PnIj Inject 1 mg into the skin every 7 days.               Diabetes Management Status    Statin: Taking  ACE/ARB: Taking    Screening or Prevention Patient's value Goal Complete/Controlled?   HgA1C Testing and Control   Lab Results   Component Value Date    HGBA1C 6.3 (H) 01/13/2023      Annually/Less than 8% Yes   Lipid profile : 09/21/2022 Annually Yes   LDL control Lab Results   Component Value Date    LDLCALC 43.8 (L) 09/21/2022    Annually/Less than 100 mg/dl  Yes   Nephropathy screening Lab Results    Component Value Date    LABMICR 5.0 09/21/2022     Lab Results   Component Value Date    PROTEINUA Negative 07/20/2020    Annually Yes   Blood pressure BP Readings from Last 1 Encounters:   03/01/23 115/62    Less than 140/90 Yes   Dilated retinal exam : 12/19/2022 Annually Yes   Foot exam   : 02/16/2023 Annually Yes     Review of Systems   Constitutional:  Negative for activity change and appetite change.   Gastrointestinal:  Negative for abdominal pain, constipation, nausea and vomiting.        Hx of IBS   Endocrine: Negative for polydipsia, polyphagia and polyuria.   Musculoskeletal:  Positive for arthralgias.   Neurological:  Negative for syncope and weakness.        Neuropathy   Psychiatric/Behavioral:  Negative for confusion.        Objective:      Physical Exam  Constitutional:       General: She is not in acute distress.     Appearance: She is not ill-appearing.   Neurological:      Mental Status: She is alert.   Psychiatric:         Speech: Speech normal.       Assessment:       1. Type 2 diabetes mellitus with hyperglycemia, with long-term current use of insulin            Plan:   Type 2 diabetes mellitus with hyperglycemia, with long-term current use of insulin  -     semaglutide (OZEMPIC) 1 mg/dose (2 mg/1.5 mL) PnIj; Inject 1 mg into the skin every 7 days.  Dispense: 1 pen; Refill: 5         PLAN:   - Condition: good control   - Monitor blood glucose 4x daily. Goals reviewed  - She is working with the RD  - BP and LDL- Recommended lifestyle modifications for management. Encouraged healthy low fat, low carb diet and increase physical activity  - Medication Changes: Continue Toujeo, Continue Metformin, Increase Ozempic to 1 mg- tolerating well  - The patient was explained the above plan and given opportunity to ask questions.  She understands, chooses and consents to this plan and accepts all the risks, which include but are not limited to the risks mentioned above.   - Labs ordered as above  - Nurse  visit:  deferred    - Follow up: 3 months       If blood pre-meal sugar is 151-200 take +2 units of Novolog;  If blood pre-meal sugar is 201-250 take +4 units of Novolog;  If blood pre-meal sugar is 251-300 take +6 units of Novolog;  If blood pre-meal sugar is 301-350 take +8 units of Novolog;  If blood pre-meal sugar is 351-400+ take +10 units of Novolog;      A total of 15 minutes was spent in face to face time, of which over 50% was spent in counseling patient on disease process, complications, treatment, and side effects of medications.

## 2023-03-10 ENCOUNTER — HOSPITAL ENCOUNTER (OUTPATIENT)
Facility: HOSPITAL | Age: 53
Discharge: HOME | End: 2023-03-10
Attending: INTERNAL MEDICINE | Admitting: INTERNAL MEDICINE
Payer: MEDICAID

## 2023-03-10 ENCOUNTER — ANESTHESIA EVENT (OUTPATIENT)
Dept: ENDOSCOPY | Facility: HOSPITAL | Age: 53
End: 2023-03-10
Payer: MEDICAID

## 2023-03-10 ENCOUNTER — ANESTHESIA (OUTPATIENT)
Dept: ENDOSCOPY | Facility: HOSPITAL | Age: 53
End: 2023-03-10
Payer: MEDICAID

## 2023-03-10 DIAGNOSIS — K21.9 GASTROESOPHAGEAL REFLUX DISEASE WITHOUT ESOPHAGITIS: Primary | ICD-10-CM

## 2023-03-10 DIAGNOSIS — K21.9 GERD (GASTROESOPHAGEAL REFLUX DISEASE): ICD-10-CM

## 2023-03-10 LAB
GLUCOSE SERPL-MCNC: 78 MG/DL (ref 70–110)
POCT GLUCOSE: 78 MG/DL (ref 70–110)

## 2023-03-10 PROCEDURE — 43239 PR EGD, FLEX, W/BIOPSY, SGL/MULTI: ICD-10-PCS | Mod: 51,,, | Performed by: INTERNAL MEDICINE

## 2023-03-10 PROCEDURE — 37000009 HC ANESTHESIA EA ADD 15 MINS: Performed by: INTERNAL MEDICINE

## 2023-03-10 PROCEDURE — 00813 ANES UPR LWR GI NDSC PX: CPT | Performed by: INTERNAL MEDICINE

## 2023-03-10 PROCEDURE — G0105 COLORECTAL SCRN; HI RISK IND: HCPCS | Performed by: INTERNAL MEDICINE

## 2023-03-10 PROCEDURE — 25000003 PHARM REV CODE 250: Performed by: INTERNAL MEDICINE

## 2023-03-10 PROCEDURE — 63600175 PHARM REV CODE 636 W HCPCS: Performed by: NURSE ANESTHETIST, CERTIFIED REGISTERED

## 2023-03-10 PROCEDURE — 43239 EGD BIOPSY SINGLE/MULTIPLE: CPT | Mod: 51,,, | Performed by: INTERNAL MEDICINE

## 2023-03-10 PROCEDURE — 88305 TISSUE EXAM BY PATHOLOGIST: ICD-10-PCS | Mod: 26,,, | Performed by: PATHOLOGY

## 2023-03-10 PROCEDURE — 27201012 HC FORCEPS, HOT/COLD, DISP: Performed by: INTERNAL MEDICINE

## 2023-03-10 PROCEDURE — 88305 TISSUE EXAM BY PATHOLOGIST: CPT | Mod: 26,,, | Performed by: PATHOLOGY

## 2023-03-10 PROCEDURE — 45378 PR COLONOSCOPY,DIAGNOSTIC: ICD-10-PCS | Mod: ,,, | Performed by: INTERNAL MEDICINE

## 2023-03-10 PROCEDURE — 25000003 PHARM REV CODE 250: Performed by: NURSE ANESTHETIST, CERTIFIED REGISTERED

## 2023-03-10 PROCEDURE — 43239 EGD BIOPSY SINGLE/MULTIPLE: CPT | Performed by: INTERNAL MEDICINE

## 2023-03-10 PROCEDURE — 37000008 HC ANESTHESIA 1ST 15 MINUTES: Performed by: INTERNAL MEDICINE

## 2023-03-10 PROCEDURE — 45378 DIAGNOSTIC COLONOSCOPY: CPT | Mod: ,,, | Performed by: INTERNAL MEDICINE

## 2023-03-10 PROCEDURE — 88305 TISSUE EXAM BY PATHOLOGIST: CPT | Performed by: PATHOLOGY

## 2023-03-10 RX ORDER — PROPOFOL 10 MG/ML
VIAL (ML) INTRAVENOUS
Status: DISCONTINUED | OUTPATIENT
Start: 2023-03-10 | End: 2023-03-10

## 2023-03-10 RX ORDER — DEXTROMETHORPHAN/PSEUDOEPHED 2.5-7.5/.8
DROPS ORAL
Status: COMPLETED | OUTPATIENT
Start: 2023-03-10 | End: 2023-03-10

## 2023-03-10 RX ORDER — LIDOCAINE HYDROCHLORIDE 20 MG/ML
INJECTION, SOLUTION EPIDURAL; INFILTRATION; INTRACAUDAL; PERINEURAL
Status: DISCONTINUED | OUTPATIENT
Start: 2023-03-10 | End: 2023-03-10

## 2023-03-10 RX ADMIN — PROPOFOL 20 MG: 10 INJECTION, EMULSION INTRAVENOUS at 07:03

## 2023-03-10 RX ADMIN — SODIUM CHLORIDE, POTASSIUM CHLORIDE, SODIUM LACTATE AND CALCIUM CHLORIDE: 600; 310; 30; 20 INJECTION, SOLUTION INTRAVENOUS at 07:03

## 2023-03-10 RX ADMIN — LIDOCAINE HYDROCHLORIDE 50 MG: 20 INJECTION, SOLUTION EPIDURAL; INFILTRATION; INTRACAUDAL; PERINEURAL at 07:03

## 2023-03-10 RX ADMIN — PROPOFOL 30 MG: 10 INJECTION, EMULSION INTRAVENOUS at 07:03

## 2023-03-10 NOTE — ANESTHESIA PREPROCEDURE EVALUATION
03/10/2023  Yolanda Betts is a 52 y.o., female.      Pre-op Assessment    I have reviewed the Patient Summary Reports.     I have reviewed the Nursing Notes. I have reviewed the NPO Status.   I have reviewed the Medications.     Review of Systems  Anesthesia Hx:  No problems with previous Anesthesia  Personal Hx of Anesthesia complications   Cardiovascular:   Hypertension  Hypertension    Pulmonary:   Asthma Sleep Apnea  Asthma:  Obstructive Sleep Apnea (ALEN).   Hepatic/GI:   GERD Liver Disease,  Liver Disease    Neurological:   Neuromuscular Disease, Headaches  Dx of Headaches Neuromuscular Disease   Endocrine:   Diabetes  Diabetes    Psych:   Psychiatric History          Physical Exam  General: Well nourished    slow to wake up    Anesthesia Plan  Type of Anesthesia, risks & benefits discussed:    Anesthesia Type: MAC  Intra-op Monitoring Plan: Standard ASA Monitors  ASA Score: 3  Day of Surgery Review of History & Physical: H&P Update referred to the surgeon/provider.    Ready For Surgery From Anesthesia Perspective.     .

## 2023-03-10 NOTE — PR[employer]ATION PATIENT INSTRUCTIONS
Discharge Summary/Instructions after an Endoscopic Procedure  Patient Name: Yolanda Betts  Patient MRN: [id number]  Patient YOB: 1970  Friday, March 10, 2023 Lalita Montes De Oca MD  Dear patient,  As a result of recent federal legislation (The Federal Cures Act), you may   receive lab or pathology results from your procedure in your [facility]   account before your physician is able to contact you. Your physician or   their representative will relay the results to you with their   recommendations at their soonest availability.  Thank you,  RESTRICTIONS:  During your procedure today, you received medications for sedation.  These   medications may affect your judgment, balance and coordination.  Therefore,   for 24 hours, you have the following restrictions:   - DO NOT drive a car, operate machinery, make legal/financial decisions,   sign important papers or drink alcohol.    ACTIVITY:  Today: no heavy lifting, straining or running due to procedural   sedation/anesthesia.  The following day: return to full activity including work.  DIET:  Eat and drink normally unless instructed otherwise.     TREATMENT FOR COMMON SIDE EFFECTS:  - Mild abdominal pain, nausea, belching, bloating or excessive gas:  rest,   eat lightly and use a heating pad.  - Sore Throat: treat with throat lozenges and/or gargle with warm salt   water.  - Because air was used during the procedure, expelling large amounts of air   from your rectum or belching is normal.  - If a bowel prep was taken, you may not have a bowel movement for 1-3 days.    This is normal.  SYMPTOMS TO WATCH FOR AND REPORT TO YOUR PHYSICIAN:  1. Abdominal pain or bloating, other than gas cramps.  2. Chest pain.  3. Back pain.  4. Signs of infection such as: chills or fever occurring within 24 hours   after the procedure.  5. Rectal bleeding, which would show as bright red, maroon, or black stools.   (A tablespoon of blood from the rectum is not serious, especially  if   hemorrhoids are present.)  6. Vomiting.  7. Weakness or dizziness.  GO DIRECTLY TO THE NEAREST EMERGENCY ROOM IF YOU HAVE ANY OF THE FOLLOWING:      Difficulty breathing              Chills and/or fever over 101 F   Persistent vomiting and/or vomiting blood   Severe abdominal pain   Severe chest pain   Black, tarry stools   Bleeding- more than one tablespoon   Any other symptom or condition that you feel may need urgent attention  Your doctor recommends these additional instructions:  If any biopsies were taken, your doctors clinic will contact you in 1 to 2   weeks with any results.  - Discharge patient to home.   - Resume previous diet.   - Continue present medications.   - Await pathology results.   - Return to referring physician.  For questions, problems or results please call your physician Lalita Montes De Oca MD at Work:  [phone]  If you have any questions about the above instructions, call the GI   department at [phone] or call the endoscopy unit at [phone]   from 7am until 3 pm.  [facility] [place], EMERGENCY ROOM PHONE NUMBER:   [phone]  IF A COMPLICATION OR EMERGENCY SITUATION ARISES AND YOU ARE UNABLE TO REACH   YOUR PHYSICIAN - GO DIRECTLY TO THE EMERGENCY ROOM.  I have read or have had read to me these discharge instructions for my   procedure and have received a written copy.  I understand these   instructions and will follow-up with my physician if I have any questions.     __________________________________       _____________________________________  Nurse Signature                                          Patient/Designated   Responsible Party Signature  Lalita Montes De Oca MD  3/10/2023 7:10:29 AM  This report has been verified and signed electronically.  Dear patient,  As a result of recent federal legislation (The Federal Cures Act), you may   receive lab or pathology results from your procedure in your [facility]   account before your  physician is able to contact you. Your physician or   their representative will relay the results to you with their   recommendations at their soonest availability.  Thank you,  [employer]ATION

## 2023-03-10 NOTE — TRANSFER OF CARE
Anesthesia Transfer of Care Note    Patient: Yolanda Betts    Procedure(s) Performed: Procedure(s) (LRB):  EGD (ESOPHAGOGASTRODUODENOSCOPY) (N/A)  COLONOSCOPY (N/A)    Patient location: GI    Anesthesia Type: MAC    Transport from OR: Transported from OR on room air with adequate spontaneous ventilation    Post pain: adequate analgesia    Post assessment: no apparent anesthetic complications    Post vital signs: stable    Level of consciousness: awake    Nausea/Vomiting: no nausea/vomiting    Complications: none    Transfer of care protocol was followed      Last vitals:   Visit Vitals  /71 (BP Location: Left arm, Patient Position: Lying)   Pulse 78   Temp 36.6 °C (97.9 °F) (Temporal)   Resp 18   SpO2 96%   Breastfeeding No

## 2023-03-10 NOTE — ANESTHESIA POSTPROCEDURE EVALUATION
Anesthesia Post Evaluation    Patient: Yolanda Betts    Procedure(s) Performed: Procedure(s) (LRB):  EGD (ESOPHAGOGASTRODUODENOSCOPY) (N/A)  COLONOSCOPY (N/A)    Final Anesthesia Type: MAC      Patient location during evaluation: GI PACU  Patient participation: Yes- Able to Participate  Level of consciousness: awake and alert  Post-procedure vital signs: reviewed and stable  Pain management: adequate  Airway patency: patent  ALEN mitigation strategies: Multimodal analgesia  PONV status at discharge: No PONV  Anesthetic complications: no      Cardiovascular status: hemodynamically stable  Respiratory status: unassisted, room air and spontaneous ventilation  Hydration status: euvolemic  Follow-up not needed.                No case tracking events are documented in the log.      Pain/Elif Score: No data recorded

## 2023-03-10 NOTE — H&P
PRE PROCEDURE H&P    Patient Name: Yolanda Betts  MRN: [id number]  : 1970  Date of Procedure:  3/10/2023  Referring Physician: Ruby Olvera NP  Primary Physician: Sasha Sorenson MD  Procedure Physician: Lalita Montes De Oca MD       Planned Procedure: Colonoscopy and EGD  Diagnosis: previous adenomatous polyp  Dysphagia and GERD   Chief Complaint: Same as above    HPI: Patient is an 52 y.o. female is here for the above.     Last colonoscopy:   Family history: None   Anticoagulation: None     Past Medical History:   Past Medical History:   Diagnosis Date    Abnormal Pap smear of cervix     HPV genital warts    Anemia     Anxiety     Arthritis     Asthma 10/11/2016    Bipolar 1 disorder     Diabetes mellitus, type 2     Dyslipidemia associated with type 2 diabetes mellitus 2019    General anesthetics causing adverse effect in therapeutic use     Genital warts     GERD (gastroesophageal reflux disease)     Herpes simplex virus (HSV) infection     Hyperlipidemia     Hypertension     Hypertension complicating diabetes 2019    Migraine with aura and without status migrainosus, not intractable 3/21/2016    Mild persistent asthma without complication 10/11/2016    Morbid obesity with body mass index (BMI) of 45.0 to 49.9 in adult 8/3/2017    Obstructive sleep apnea     ALEN (obstructive sleep apnea)     2L per N/C q HS    Schizoaffective disorder, bipolar type 2019    Seasonal allergic rhinitis due to pollen 2019    Type 2 diabetes mellitus with hyperglycemia, with long-term current use of insulin 2017    Type 2 diabetes mellitus with hyperglycemia, without long-term current use of insulin 2017        Past Surgical History:  Past Surgical History:   Procedure Laterality Date    abcess removed right back      BELT ABDOMINOPLASTY      BREAST SURGERY Bilateral     Reduction     SECTION      X 2    COLONOSCOPY      COLONOSCOPY N/A 2018    Procedure:  colonoscopy for iron deficiency anemia;  Surgeon: Frankie Sanchez MD;  Location: [facility];  Service: Endoscopy;  Laterality: N/A;    COLONOSCOPY N/A 2/28/2018    Procedure: COLONOSCOPY;  Surgeon: Frankie Sanchez MD;  Location: [facility];  Service: Endoscopy;  Laterality: N/A;    EPIDURAL STEROID INJECTION INTO CERVICAL SPINE N/A 1/31/2023    Procedure: C6/7 IL ROCAEL RN IV Sedation;  Surgeon: Otto Phillips MD;  Location: [facility];  Service: Pain Management;  Laterality: N/A;    HYSTERECTOMY      w/ BSO; hypermenorrhea    MOUTH SURGERY  1996    OOPHORECTOMY      hyst/bso, hypermenorrhea    ROBOT-ASSISTED CHOLECYSTECTOMY USING DA DARI XI N/A 1/3/2020    Procedure: XI ROBOTIC CHOLECYSTECTOMY;  Surgeon: Damir Driscoll MD;  Location: [facility];  Service: General;  Laterality: N/A;    TOTAL REDUCTION MAMMOPLASTY      TUBAL LIGATION          Home Medications:  Prior to Admission medications    Medication Sig Start Date End Date Taking? Authorizing Provider   ALPRAZolam (XANAX XR) 2 MG Tb24 Take 1 tablet by mouth at bedtime 2/17/23  Yes Michelle Saucedo NP   amlodipine-valsartan (EXFORGE)  mg per tablet Take 1 tablet by mouth once daily. 2/1/23  Yes Sasha Sorenson MD   amlodipine-valsartan (EXFORGE)  mg per tablet Take 1 tablet by mouth daily 2/16/23  Yes Lo Dalton MD   aspirin (ECOTRIN) 81 MG EC tablet Take 81 mg by mouth once daily.   Yes Historical Provider   aspirin (ECOTRIN) 81 MG EC tablet Take 1 tablet by mouth daily 2/16/23  Yes Lo Dalton MD   atorvastatin (LIPITOR) 20 MG tablet Take 1 tablet (20 mg total) by mouth every evening. 1/5/23 1/5/24 Yes Jaimie Wills MD   atorvastatin (LIPITOR) 20 MG tablet Take 1 tablet by mouth daily at bedtime 2/16/23  Yes Lo Dalton MD   budesonide-formoterol 160-4.5 mcg (SYMBICORT) 160-4.5 mcg/actuation HFAA Inhale 2 puffs into the lungs every 12 (twelve) hours. Controller : Use spacer 2/1/23  Yes Sasha Sorenson MD    budesonide-formoterol 160-4.5 mcg (SYMBICORT) 160-4.5 mcg/actuation HFAA Inhale 2 puffs into the lungs every 12 hours 2/16/23  Yes Lo Dalton MD   cycloSPORINE (RESTASIS) 0.05 % ophthalmic emulsion Place 1 drop into both eyes twice daily 2/16/23  Yes Lo Dalton MD   DULoxetine (CYMBALTA) 30 MG capsule Take 1 capsule by mouth every morning 2/23/23 3/25/23 Yes Sasha Sorenson MD   DULoxetine (CYMBALTA) 60 MG capsule Take 1 capsule (60 mg total) by mouth once daily. 1/17/23 4/17/23 Yes Bernice Womack PA-C   ergocalciferol (ERGOCALCIFEROL) 50,000 unit Cap Take 1 capsule twice weekly for 3 weeks then weekly 2/1/23  Yes Sasha Sorenson MD   estradioL (ESTRACE) 0.01 % (0.1 mg/gram) vaginal cream Place 1 g vaginally once daily. 2/20/23 2/20/24 Yes Starla Franco NP   fenofibrate (TRICOR) 145 MG tablet Take 1 tablet (145 mg total) by mouth once daily. 1/5/23 5/3/23 Yes Jaimie Wills MD   fenofibrate (TRICOR) 145 MG tablet Take 1 tablet by mouth daily 2/16/23  Yes Lo Dalton MD   furosemide (LASIX) 20 MG tablet Take 1 tablet (20 mg total) by mouth once daily. 2/1/23 2/1/24 Yes Sasha Sorenson MD   furosemide (LASIX) 20 MG tablet Take 1 tablet by mouth daily 2/16/23  Yes Lo Dalton MD   HEALTHYLAX 17 gram PwPk Take 17 g by mouth 2 (two) times daily. 6/9/22  Yes Historical Provider   insulin aspart U-100 (NOVOLOG FLEXPEN U-100 INSULIN) 100 unit/mL (3 mL) InPn pen Inject 10 Units into the skin 3 (three) times daily before meals. Use based on sliding scale 1/4/23 1/4/24 Yes Elizabeth Ceron NP   insulin glargine U-300 conc (TOUJEO MAX U-300 SOLOSTAR) 300 unit/mL (3 mL) insulin pen Inject 92 Units into the skin once daily. 1/4/23 4/4/23 Yes Elizabeth Ceron, IRASEMA   lamoTRIgine (LAMICTAL) 200 MG tablet Take 1 tablet by mouth at bedtime 2/17/23  Yes Michelle Saucedo, NP   levocetirizine (XYZAL) 5 MG tablet Take 1 tablet (5 mg total) by mouth every evening. 12/23/22 12/23/23 Yes Rose Khan, DNP  "  levocetirizine (XYZAL) 5 MG tablet Take 1 tablet by mouth daily at bedtime 2/16/23  Yes Lo Dalton MD   lurasidone (LATUDA) 60 mg Tab tablet Take 1 tablet by mouth daily with 350 calories 1/9/23  Yes Mariam Young, PhD, MPAP   magnesium oxide (MAG-OX) 400 mg (241.3 mg magnesium) tablet Take 1 tablet by mouth daily 2/16/23  Yes Lo Dalton MD   metFORMIN (GLUCOPHAGE) 1000 MG tablet TAKE 1 TABLET(1000 MG) BY MOUTH TWICE DAILY WITH MEALS 2/24/23  Yes Elizabeth Ceron NP   metoprolol succinate (TOPROL-XL) 50 MG 24 hr tablet Take 1 tablet (50 mg total) by mouth every evening. 2/1/23 5/2/23 Yes Sasha Sorenson MD   mirtazapine (REMERON) 7.5 MG Tab Take 7.5 mg by mouth every evening. 1/23/23  Yes Historical Provider   mirtazapine (REMERON) 7.5 MG Tab Take 1 tablet by mouth at bedtime 2/27/23  Yes Lo Dalton MD   omeprazole (PRILOSEC) 40 MG capsule Take 1 capsule (40 mg total) by mouth once daily. 2/1/23 2/1/24 Yes Sasha Sorenson MD   omeprazole (PRILOSEC) 40 MG capsule Take 1 capsule by mouth daily 2/16/23  Yes Lo Dalton MD   semaglutide (OZEMPIC) 1 mg/dose (4 mg/3 mL) Inject 1 mg into the skin every 7 days. 3/8/23 3/7/24 Yes Elizabeth Ceron NP   tiZANidine (ZANAFLEX) 4 MG tablet Take 2 tablets by mouth every 6 hours as needed 2/16/23  Yes Lo Dalton MD   albuterol (PROVENTIL/VENTOLIN HFA) 90 mcg/actuation inhaler Inhale 2 puffs into the lungs every 6 hours as needed for wheezing 8/26/22   Rose Khan DNP   albuterol (PROVENTIL/VENTOLIN HFA) 90 mcg/actuation inhaler Inhale 2 puffs into the lungs every 6 hours as needed 2/16/23   Lo Dalton MD   BD INSULIN SYRINGE ULTRA-FINE 1/2 mL 30 gauge x 1/2" Syrg  10/3/16   Historical Provider   blood sugar diagnostic (ONETOUCH ULTRA TEST) Strp Check blood glucose 4 times daily as directed and as needed (dispense insurance preferred brand or patient choice) 1/10/22   Elizabeth Ceron NP   blood sugar diagnostic Strp 1 each by Misc.(Non-Drug; Combo Route) " "route 4 (four) times daily. [facility] Glucose Test Strips 6/11/21   Elizabeth Ceron NP   butalbital-acetaminophen-caffeine -40 mg (FIORICET, ESGIC) -40 mg per tablet Take by mouth. 10/7/22   Historical Provider   butalbital-acetaminophen-caffeine -40 mg (FIORICET, ESGIC) -40 mg per tablet Take 1 tablet by mouth every 4 hours as needed for headache 9/15/22   Rose Khan DNP   cycloSPORINE 0.05 % Drop Place 1 drop into both eyes 2 (two) times daily. 12/19/22   Toni Holland, GABI   diclofenac sodium (VOLTAREN) 1 % Gel Apply topically to affected areas 4 times daily 2/16/23   Lo Dalton MD   flash glucose scanning reader (FREESTYLE AMY 2 READER) Misc Use as directed to check blood sugar 5/19/21   Elizabeth Ceron NP   fluticasone propionate (FLONASE) 50 mcg/actuation nasal spray 2 sprays (100 mcg total) by Each Nostril route once daily. 7/29/22   Any Riley MD   frovatriptan (FROVA) 2.5 MG tablet Take 1 tablet at onset of acute migraine. May take 1 more tablet 2 hours later if needed. (MAX 2 TABLET PER DAY. MAX 4 TABLETS PER WEEK.) 12/23/22   Rose Khan DNP   lancets (ONETOUCH DELICA PLUS LANCET) 30 gauge Misc Use as directed 3 times daily 9/19/22   Rose Khan DNP   LIDOcaine-prilocaine (EMLA) cream Apply topically up to 4 times per day to affected area for pain relief 8/26/22   Rose Khan DNP   magnesium oxide (MAG-OX) 400 mg (241.3 mg magnesium) tablet Take 1 tablet (400 mg total) by mouth once daily. 1/5/23   Jaimie Wills MD   naltrexone capsule Take 1 capsule (4.5 mg total) by mouth every evening. 9/21/22   Mohini Wyman PA-C   naproxen (NAPROSYN) 500 MG tablet Take 1 tablet (500 mg total) by mouth 2 (two) times daily. 12/15/22   Sonia Dhaliwal PA-C   pen needle, diabetic (BD ULTRA-FINE MINI PEN NEEDLE) 31 gauge x 3/16" Ndle Use 5 a day 7/8/22   Elizabeth Ceron NP   pen needle, diabetic (BD ULTRA-FINE MINI " "PEN NEEDLE) 31 gauge x 3/16" Ndle Use one needle 5 times a day 8/26/22   Rose Khan, CHRIS   pneumoc 20-tatiana conj-dip cr,PF, (PREVNAR-20, PF,) 0.5 mL Syrg injection Inject 0.5 mLs into the muscle. 1/17/23   Rafael Causey, PharmD   promethazine (PHENERGAN) 12.5 MG Tab  8/22/22   Historical Provider   prucalopride (MOTEGRITY) 2 mg Tab Take 1 tablet (2 mg) by mouth once daily. 1/26/23   uRby Olvera NP   spironolactone (ALDACTONE) 25 MG tablet Take 1 tablet (25 mg total) by mouth once daily. 2/1/23 2/1/24  Sasha Sorenson MD   SUPREP BOWEL PREP KIT 17.5-3.13-1.6 gram SolR Use as directed 1/26/23   Ruby Olvera NP   clonazePAM (KLONOPIN) 1 MG tablet Take 1 tablet by mouth daily 2/16/23 2/17/23     glucagon, human recombinant, (GLUCAGON EMERGENCY KIT, HUMAN,) 1 mg SolR Inject 1 mg into the muscle as needed. Emergency use 11/25/20 12/3/20  Elizabeth Ceron NP   valsartan (DIOVAN) 320 MG tablet Take 1 tablet (320 mg total) by mouth once daily. 10/21/21 6/27/22  Ermias Mccormick MD        Allergies:  Review of patient's allergies indicates:   Allergen Reactions    Codeine Itching    Hydromorphone Other (See Comments)     Can't wake up for long time if taken  Slow to wake up after surgery after receiving    Aleve [naproxen sodium]     Lyrica [pregabalin] Itching    Motrin [ibuprofen]     Neuromuscular blockers, steroidal Hives     some    Latex, natural rubber Rash    Morphine Rash     itching    Norco [hydrocodone-acetaminophen] Itching, Rash and Hallucinations    Seconal [secobarbital sodium] Rash     itching    Tylox [oxycodone-acetaminophen] Rash        Social History:   Social History     Socioeconomic History    Marital status: Single   Tobacco Use    Smoking status: Never    Smokeless tobacco: Never   Substance and Sexual Activity    Alcohol use: Yes     Alcohol/week: 0.0 standard drinks     Comment: socially  No alcohol 72h prior to sx    Drug use: No    Sexual activity: Yes     Partners: Male " "    Birth control/protection: Surgical     Comment: john   Social History Narrative    Long-term care nurse     Social Determinants of Health     Financial Resource Strain: High Risk    Difficulty of Paying Living Expenses: Very hard   Food Insecurity: Food Insecurity Present    Worried About Running Out of Food in the Last Year: Sometimes true    Ran Out of Food in the Last Year: Sometimes true   Transportation Needs: No Transportation Needs    Lack of Transportation (Medical): No    Lack of Transportation (Non-Medical): No   Physical Activity: Inactive    Days of Exercise per Week: 0 days    Minutes of Exercise per Session: 0 min   Stress: Stress Concern Present    Feeling of Stress : Very much   Social Connections: Moderately Isolated    Frequency of Communication with Friends and Family: More than three times a week    Frequency of Social Gatherings with Friends and Family: More than three times a week    Attends Confucianist Services: More than 4 times per year    Active Member of Clubs or Organizations: No    Attends Club or Organization Meetings: Never    Marital Status:    Housing Stability: High Risk    Unable to Pay for Housing in the Last Year: Yes    Number of Places Lived in the Last Year: 1    Unstable Housing in the Last Year: No       Family History:  Family History   Problem Relation Age of Onset    Diabetes Mother     Hyperlipidemia Mother     Hypertension Mother     Asthma Mother     COPD Mother     Glaucoma Mother     Thyroid disease Mother     Anesthesia problems Mother         "almost had a cardiac arrest" , blood clots    Hypertension Father     Hyperlipidemia Father     Cancer Father         Brain, lung, liver, kidney    Heart disease Maternal Grandmother     Hyperlipidemia Maternal Grandmother     Hypertension Maternal Grandmother     Cataracts Maternal Grandmother     Diabetes Maternal Grandmother     Heart disease Maternal Grandfather     Hyperlipidemia Maternal Grandfather     " Hypertension Maternal Grandfather     Glaucoma Maternal Grandfather     Cancer Maternal Grandfather     Cataracts Maternal Grandfather     Macular degeneration Maternal Grandfather     Diabetes Maternal Grandfather     Heart disease Paternal Grandmother     Hyperlipidemia Paternal Grandmother     Hypertension Paternal Grandmother     Cataracts Paternal Grandmother     Heart disease Paternal Grandfather     Hyperlipidemia Paternal Grandfather     Hypertension Paternal Grandfather     Cataracts Paternal Grandfather     Breast cancer Maternal Cousin     Breast cancer Maternal Cousin     Breast cancer Maternal Cousin     Breast cancer Maternal Cousin        ROS: No acute cardiac events, no acute respiratory complaints.     Physical Exam (all patients):    /71 (BP Location: Left arm, Patient Position: Lying)   Pulse 78   Temp 97.9 °F (36.6 °C) (Temporal)   Resp 18   SpO2 96%   Breastfeeding No   Lungs: Clear to auscultation bilaterally, respirations unlabored  Heart: Regular rate and rhythm, S1 and S2 normal, no obvious murmurs  Abdomen:         Soft, non-tender, bowel sounds normal, no masses, no organomegaly    Lab Results   Component Value Date    WBC 9.06 01/13/2023    MCV 85 01/13/2023    RDW 13.7 01/13/2023     01/13/2023    INR 1.0 06/26/2020    GLU 60 (L) 02/06/2023    HGBA1C 6.3 (H) 01/13/2023    BUN 16 02/06/2023     02/06/2023    K 4.5 02/06/2023     02/06/2023        SEDATION PLAN: per anesthesia      History reviewed, vital signs satisfactory, cardiopulmonary status satisfactory, sedation options, risks and plans have been discussed with the patient  All their questions were answered and the patient agrees to the sedation procedures as planned and the patient is deemed an appropriate candidate for the sedation as planned.    Procedure explained to patient, informed consent obtained and placed in chart.    Lalita Montes De Oca  3/10/2023  6:54 AM

## 2023-03-10 NOTE — PROVATION PATIENT INSTRUCTIONS
Discharge Summary/Instructions after an Endoscopic Procedure  Patient Name: Yolanda Betts  Patient MRN: [id number]  Patient YOB: 1970  Friday, March 10, 2023 Lalita Montes De Oca MD  Dear patient,  As a result of recent federal legislation (The Federal Cures Act), you may   receive lab or pathology results from your procedure in your [facility]   account before your physician is able to contact you. Your physician or   their representative will relay the results to you with their   recommendations at their soonest availability.  Thank you,  RESTRICTIONS:  During your procedure today, you received medications for sedation.  These   medications may affect your judgment, balance and coordination.  Therefore,   for 24 hours, you have the following restrictions:   - DO NOT drive a car, operate machinery, make legal/financial decisions,   sign important papers or drink alcohol.    ACTIVITY:  Today: no heavy lifting, straining or running due to procedural   sedation/anesthesia.  The following day: return to full activity including work.  DIET:  Eat and drink normally unless instructed otherwise.     TREATMENT FOR COMMON SIDE EFFECTS:  - Mild abdominal pain, nausea, belching, bloating or excessive gas:  rest,   eat lightly and use a heating pad.  - Sore Throat: treat with throat lozenges and/or gargle with warm salt   water.  - Because air was used during the procedure, expelling large amounts of air   from your rectum or belching is normal.  - If a bowel prep was taken, you may not have a bowel movement for 1-3 days.    This is normal.  SYMPTOMS TO WATCH FOR AND REPORT TO YOUR PHYSICIAN:  1. Abdominal pain or bloating, other than gas cramps.  2. Chest pain.  3. Back pain.  4. Signs of infection such as: chills or fever occurring within 24 hours   after the procedure.  5. Rectal bleeding, which would show as bright red, maroon, or black stools.   (A tablespoon of blood from the rectum is not serious, especially  if   hemorrhoids are present.)  6. Vomiting.  7. Weakness or dizziness.  GO DIRECTLY TO THE NEAREST EMERGENCY ROOM IF YOU HAVE ANY OF THE FOLLOWING:      Difficulty breathing              Chills and/or fever over 101 F   Persistent vomiting and/or vomiting blood   Severe abdominal pain   Severe chest pain   Black, tarry stools   Bleeding- more than one tablespoon   Any other symptom or condition that you feel may need urgent attention  Your doctor recommends these additional instructions:  If any biopsies were taken, your doctors clinic will contact you in 1 to 2   weeks with any results.  - Discharge patient to home.   - Resume previous diet.   - Continue present medications.   - Repeat colonoscopy in 1 year because the bowel preparation was suboptimal.     - Patient will need the extended bowel preparation for next colonoscopy.   - Patient has a contact number available for emergencies.  The signs and   symptoms of potential delayed complications were discussed with the   patient.  Return to normal activities tomorrow.  Written discharge   instructions were provided to the patient.  For questions, problems or results please call your physician Lalita Montes De Oca MD at Work:  [phone]  If you have any questions about the above instructions, call the GI   department at [phone] or call the endoscopy unit at [phone]   from 7am until 3 pm.  [facility] [place], EMERGENCY ROOM PHONE NUMBER:   [phone]  IF A COMPLICATION OR EMERGENCY SITUATION ARISES AND YOU ARE UNABLE TO REACH   YOUR PHYSICIAN - GO DIRECTLY TO THE EMERGENCY ROOM.  I have read or have had read to me these discharge instructions for my   procedure and have received a written copy.  I understand these   instructions and will follow-up with my physician if I have any questions.     __________________________________       _____________________________________  Nurse Signature                                           Patient/Designated   Responsible Party Signature  Lalita Montes De Oca MD  3/10/2023 7:29:15 AM  This report has been verified and signed electronically.  Dear patient,  As a result of recent federal legislation (The Federal Cures Act), you may   receive lab or pathology results from your procedure in your [facility]   account before your physician is able to contact you. Your physician or   their representative will relay the results to you with their   recommendations at their soonest availability.  Thank you,  PROVATION

## 2023-03-10 NOTE — PLAN OF CARE
Dr garcia came to bedside to discuss findings. Vital signs stable, no patient c/o nausea/vomitting, no abdominal pain, no gi bleeding. Patient to be discharged from unit.

## 2023-03-13 VITALS
SYSTOLIC BLOOD PRESSURE: 116 MMHG | HEART RATE: 73 BPM | DIASTOLIC BLOOD PRESSURE: 58 MMHG | RESPIRATION RATE: 15 BRPM | TEMPERATURE: 98 F | OXYGEN SATURATION: 99 %

## 2023-03-14 ENCOUNTER — TELEPHONE (OUTPATIENT)
Dept: PRIMARY CARE CLINIC | Facility: CLINIC | Age: 53
End: 2023-03-14
Payer: MEDICAID

## 2023-03-14 NOTE — PROGRESS NOTES
I have received the report about your recent colonoscopy                         Resume previous diet.                         - Repeat colonoscopy in one year due to poor prep

## 2023-03-14 NOTE — TELEPHONE ENCOUNTER
Pt called regarding labs. Pt informed of labs and provider orders, pt voiced understanding.  ----- Message from Sasha Sorenson MD sent at 3/13/2023  9:23 PM CDT -----  I have received the report about your recent colonoscopy                         Resume previous diet.                         - Repeat colonoscopy in one year due to poor prep

## 2023-03-14 NOTE — PROGRESS NOTES
I have reviewed the report of your recent EGD-Hiatal hernia seen   Await pathology report  Resume previous diet and medications.  Thank you.

## 2023-03-16 ENCOUNTER — OFFICE VISIT (OUTPATIENT)
Dept: PRIMARY CARE CLINIC | Facility: CLINIC | Age: 53
End: 2023-03-16
Payer: MEDICAID

## 2023-03-16 DIAGNOSIS — Z13.31 POSITIVE DEPRESSION SCREENING: Primary | ICD-10-CM

## 2023-03-16 DIAGNOSIS — Z09 FOLLOW-UP EXAM: ICD-10-CM

## 2023-03-16 DIAGNOSIS — F31.9 BIPOLAR 1 DISORDER: ICD-10-CM

## 2023-03-16 LAB
FINAL PATHOLOGIC DIAGNOSIS: NORMAL
Lab: NORMAL

## 2023-03-16 PROCEDURE — 1160F RVW MEDS BY RX/DR IN RCRD: CPT | Mod: CPTII,95,, | Performed by: NURSE PRACTITIONER

## 2023-03-16 PROCEDURE — 3072F PR LOW RISK FOR RETINOPATHY: ICD-10-PCS | Mod: CPTII,95,, | Performed by: NURSE PRACTITIONER

## 2023-03-16 PROCEDURE — 4010F ACE/ARB THERAPY RXD/TAKEN: CPT | Mod: CPTII,95,, | Performed by: NURSE PRACTITIONER

## 2023-03-16 PROCEDURE — 1160F PR REVIEW ALL MEDS BY PRESCRIBER/CLIN PHARMACIST DOCUMENTED: ICD-10-PCS | Mod: CPTII,95,, | Performed by: NURSE PRACTITIONER

## 2023-03-16 PROCEDURE — 99213 PR OFFICE/OUTPT VISIT, EST, LEVL III, 20-29 MIN: ICD-10-PCS | Mod: 95,,, | Performed by: NURSE PRACTITIONER

## 2023-03-16 PROCEDURE — 3044F HG A1C LEVEL LT 7.0%: CPT | Mod: CPTII,95,, | Performed by: NURSE PRACTITIONER

## 2023-03-16 PROCEDURE — 1159F MED LIST DOCD IN RCRD: CPT | Mod: CPTII,95,, | Performed by: NURSE PRACTITIONER

## 2023-03-16 PROCEDURE — 3044F PR MOST RECENT HEMOGLOBIN A1C LEVEL <7.0%: ICD-10-PCS | Mod: CPTII,95,, | Performed by: NURSE PRACTITIONER

## 2023-03-16 PROCEDURE — 3072F LOW RISK FOR RETINOPATHY: CPT | Mod: CPTII,95,, | Performed by: NURSE PRACTITIONER

## 2023-03-16 PROCEDURE — 4010F PR ACE/ARB THEARPY RXD/TAKEN: ICD-10-PCS | Mod: CPTII,95,, | Performed by: NURSE PRACTITIONER

## 2023-03-16 PROCEDURE — 1159F PR MEDICATION LIST DOCUMENTED IN MEDICAL RECORD: ICD-10-PCS | Mod: CPTII,95,, | Performed by: NURSE PRACTITIONER

## 2023-03-16 PROCEDURE — 99213 OFFICE O/P EST LOW 20 MIN: CPT | Mod: 95,,, | Performed by: NURSE PRACTITIONER

## 2023-03-16 NOTE — PATIENT INSTRUCTIONS
Continue taking all medication as prescribed.  Report to the ER for worsening of any symptoms such as difficulty breathing, substernal chest pain, or thoughts of harming self or others.

## 2023-03-16 NOTE — PROGRESS NOTES
The patient location is: [place], [place]    The chief complaint leading to consultation is: Follow up with depression    Visit type: audiovisual    Face to Face time with patient: 8 minutes  10 minutes of total time spent on the encounter, which includes face to face time and non-face to face time preparing to see the patient (eg, review of tests), Obtaining and/or reviewing separately obtained history, Documenting clinical information in the electronic or other health record, Independently interpreting results (not separately reported) and communicating results to the patient/family/caregiver, or Care coordination (not separately reported).         Each patient to whom he or she provides medical services by telemedicine is:  (1) informed of the relationship between the physician and patient and the respective role of any other health care provider with respect to management of the patient; and (2) notified that he or she may decline to receive medical services by telemedicine and may withdraw from such care at any time.    Notes:     Subjective:      Patient ID: Yolanda Betts is a 52 y.o. female.    Chief Complaint: No chief complaint on file.    There were no vitals taken for this visit.    51 y/o female presents virtually for follow up visit with depression symptoms. Currently in IOP at [facility] she was still having crying spells but all of her mental health medication with the exception of Cymbalta was adjusted yesterday at her most recent appt. Does have to follow up in two weeks for medication effectiveness and says inpatient therapy has been recommended. Denies suicidal/homicidal thoughts.     Hypertension  This is a recurrent problem. The current episode started more than 1 year ago. The problem has been waxing and waning since onset. The problem is resistant. Associated symptoms include anxiety, chest pain, headaches, malaise/fatigue, neck pain, orthopnea, peripheral edema, shortness of breath  and sweats. Pertinent negatives include no blurred vision, palpitations or PND. Agents associated with hypertension include estrogens. Risk factors for coronary artery disease include diabetes mellitus, dyslipidemia, family history, obesity, post-menopausal state, sedentary lifestyle and stress. Past treatments include diuretics and lifestyle changes. The current treatment provides moderate improvement. Compliance problems include diet and exercise.      Review of patient's allergies indicates:   Allergen Reactions    Codeine Itching    Hydromorphone Other (See Comments)     Can't wake up for long time if taken  Slow to wake up after surgery after receiving    Aleve [naproxen sodium]     Lyrica [pregabalin] Itching    Motrin [ibuprofen]     Neuromuscular blockers, steroidal Hives     some    Latex, natural rubber Rash    Morphine Rash     itching    Norco [hydrocodone-acetaminophen] Itching, Rash and Hallucinations    Seconal [secobarbital sodium] Rash     itching    Tylox [oxycodone-acetaminophen] Rash        Review of Systems   Constitutional:  Positive for malaise/fatigue.   Eyes:  Negative for blurred vision.   Respiratory:  Positive for shortness of breath.    Cardiovascular:  Positive for chest pain and orthopnea. Negative for palpitations and PND.   Musculoskeletal:  Positive for neck pain.   Neurological:  Positive for headaches.    Objective:      Physical Exam  Constitutional:       General: She is awake.   Eyes:      General: Gaze aligned appropriately.   Neurological:      Mental Status: She is alert.   Psychiatric:         Speech: Speech normal.         Thought Content: Thought content normal. Thought content does not include homicidal or suicidal ideation.       LABS REVIEWED  Admission on 03/10/2023, Discharged on 03/10/2023   Component Date Value Ref Range Status    POC Glucose 03/10/2023 78  70 - 110 MG/DL Final    POCT Glucose 03/10/2023 78  70 - 110 mg/dL Final   Office Visit on 02/20/2023    Component Date Value Ref Range Status    Trichomonas vaginalis 02/20/2023 Negative  Negative Final    Candida sp 02/20/2023 Negative  Negative Final    Damaris glabrata DNA 02/20/2023 Positive (A)  Negative Final    Damaris krusei DNA 02/20/2023 Negative  Negative Final    Bacterial vaginosis DNA 02/20/2023 Negative  Negative Final   Lab Visit on 02/06/2023   Component Date Value Ref Range Status    HIV 1/2 Ag/Ab 02/06/2023 Non-reactive  Non-reactive Final    Hepatitis B Surface Ag 02/06/2023 Non-reactive  Non-reactive Final    Hep B C IgM 02/06/2023 Non-reactive  Non-reactive Final    Hep A IgM 02/06/2023 Non-reactive  Non-reactive Final    Hepatitis C Ab 02/06/2023 Non-reactive  Non-reactive Final    RPR 02/06/2023 Non-reactive  Non-reactive Final    Sodium 02/06/2023 136  136 - 145 mmol/L Final    Potassium 02/06/2023 4.5  3.5 - 5.1 mmol/L Final    Chloride 02/06/2023 101  95 - 110 mmol/L Final    CO2 02/06/2023 24  23 - 29 mmol/L Final    Glucose 02/06/2023 60 (L)  70 - 110 mg/dL Final    BUN 02/06/2023 16  6 - 20 mg/dL Final    Creatinine 02/06/2023 0.9  0.5 - 1.4 mg/dL Final    Calcium 02/06/2023 10.2  8.7 - 10.5 mg/dL Final    Total Protein 02/06/2023 7.2  6.0 - 8.4 g/dL Final    Albumin 02/06/2023 3.9  3.5 - 5.2 g/dL Final    Total Bilirubin 02/06/2023 0.3  0.1 - 1.0 mg/dL Final    Alkaline Phosphatase 02/06/2023 56  55 - 135 U/L Final    AST 02/06/2023 17  10 - 40 U/L Final    ALT 02/06/2023 9 (L)  10 - 44 U/L Final    Anion Gap 02/06/2023 11  8 - 16 mmol/L Final    eGFR 02/06/2023 >60.0  >60 mL/min/1.73 m^2 Final   Office Visit on 02/06/2023   Component Date Value Ref Range Status    Chlamydia, Amplified DNA 02/06/2023 Not Detected  Not Detected Final    N gonorrhoeae, amplified DNA 02/06/2023 Not Detected  Not Detected Final    Trichomonas vaginalis 02/06/2023 Invalid  Negative Final    Candida sp 02/06/2023 Invalid  Negative Final    Damaris glabrata DNA 02/06/2023 Invalid  Negative Final    Candida  krusei DNA 02/06/2023 Invalid  Negative Final    Bacterial vaginosis DNA 02/06/2023 Invalid  Negative Final    Color, UA 02/06/2023 Milagros   Final    pH, UA 02/06/2023 8   Final    WBC, UA 02/06/2023 negative   Final    Nitrite, UA 02/06/2023 negative   Final    Protein, POC 02/06/2023 negative   Final    Glucose, UA 02/06/2023 normal   Final    Ketones, UA 02/06/2023 negative   Final    Urobilinogen, UA 02/06/2023 normal   Final    Bilirubin, POC 02/06/2023 negative   Final    Blood, UA 02/06/2023 negative   Final    Clarity, UA 02/06/2023 Clear   Final    Spec Grav UA 02/06/2023 1.015   Final   Admission on 01/31/2023, Discharged on 01/31/2023   Component Date Value Ref Range Status    POCT Glucose 01/31/2023 121 (H)  70 - 110 mg/dL Final   Lab Visit on 01/13/2023   Component Date Value Ref Range Status    Hemoglobin A1C 01/13/2023 6.3 (H)  4.0 - 5.6 % Final    Estimated Avg Glucose 01/13/2023 134 (H)  68 - 131 mg/dL Final    WBC 01/13/2023 9.06  3.90 - 12.70 K/uL Final    RBC 01/13/2023 4.16  4.00 - 5.40 M/uL Final    Hemoglobin 01/13/2023 10.8 (L)  12.0 - 16.0 g/dL Final    Hematocrit 01/13/2023 35.5 (L)  37.0 - 48.5 % Final    MCV 01/13/2023 85  82 - 98 fL Final    MCH 01/13/2023 26.0 (L)  27.0 - 31.0 pg Final    MCHC 01/13/2023 30.4 (L)  32.0 - 36.0 g/dL Final    RDW 01/13/2023 13.7  11.5 - 14.5 % Final    Platelets 01/13/2023 398  150 - 450 K/uL Final    MPV 01/13/2023 9.1 (L)  9.2 - 12.9 fL Final    Immature Granulocytes 01/13/2023 0.2  0.0 - 0.5 % Final    Gran # (ANC) 01/13/2023 4.5  1.8 - 7.7 K/uL Final    Immature Grans (Abs) 01/13/2023 0.02  0.00 - 0.04 K/uL Final    Lymph # 01/13/2023 3.7  1.0 - 4.8 K/uL Final    Mono # 01/13/2023 0.5  0.3 - 1.0 K/uL Final    Eos # 01/13/2023 0.2  0.0 - 0.5 K/uL Final    Baso # 01/13/2023 0.07  0.00 - 0.20 K/uL Final    nRBC 01/13/2023 0  0 /100 WBC Final    Gran % 01/13/2023 50.1  38.0 - 73.0 % Final    Lymph % 01/13/2023 41.3  18.0 - 48.0 % Final    Mono % 01/13/2023  5.8  4.0 - 15.0 % Final    Eosinophil % 01/13/2023 1.8  0.0 - 8.0 % Final    Basophil % 01/13/2023 0.8  0.0 - 1.9 % Final    Differential Method 01/13/2023 Automated   Final    Sodium 01/13/2023 142  136 - 145 mmol/L Final    Potassium 01/13/2023 4.5  3.5 - 5.1 mmol/L Final    Chloride 01/13/2023 106  95 - 110 mmol/L Final    CO2 01/13/2023 28  23 - 29 mmol/L Final    Glucose 01/13/2023 97  70 - 110 mg/dL Final    BUN 01/13/2023 13  6 - 20 mg/dL Final    Creatinine 01/13/2023 0.8  0.5 - 1.4 mg/dL Final    Calcium 01/13/2023 9.8  8.7 - 10.5 mg/dL Final    Total Protein 01/13/2023 7.1  6.0 - 8.4 g/dL Final    Albumin 01/13/2023 3.8  3.5 - 5.2 g/dL Final    Total Bilirubin 01/13/2023 0.2  0.1 - 1.0 mg/dL Final    Alkaline Phosphatase 01/13/2023 69  55 - 135 U/L Final    AST 01/13/2023 11  10 - 40 U/L Final    ALT 01/13/2023 12  10 - 44 U/L Final    Anion Gap 01/13/2023 8  8 - 16 mmol/L Final    eGFR 01/13/2023 >60  >60 mL/min/1.73 m^2 Final    Sed Rate 01/13/2023 17  0 - 36 mm/Hr Final    CRP 01/13/2023 7.0  0.0 - 8.2 mg/L Final      Assessment:       1. Positive depression screening    2. Bipolar 1 disorder    3. Follow-up exam          Plan:     Positive depression screening    Bipolar 1 disorder    Follow-up exam       Advised pt to incorporate brisk walking into her schedule if possible to help release endorphins. Pt verbalized understanding.     Continue taking all medication as prescribed.  Report to the ER for worsening of any symptoms such as difficulty breathing, substernal chest pain, or thoughts of harming self or others.    Treatment plan discussed with patient and all questions/concerns addressed. Pt verbalizes understanding and is agreeable.

## 2023-03-21 NOTE — TELEPHONE ENCOUNTER
Returned call to pt who stated she has been extremely constipated since her colonoscopy on 3/10/23. She has only had 2 small bowel movements since that time with the last one being on 3/15/23. Pt states she has been taking daily Miralax and OTC laxatives and nothing has worked. Please advise

## 2023-03-21 NOTE — TELEPHONE ENCOUNTER
----- Message from Teddy Matta sent at 3/21/2023 12:17 PM CDT -----  Contact: Yolanda Guerrero is needing a call back in regards to not having frequent bowel movements since her colonoscopy 03/09/23.She stated that she is josette lot of pain. Please call her back at [phone].

## 2023-03-22 RX ORDER — SODIUM, POTASSIUM,MAG SULFATES 17.5-3.13G
1 SOLUTION, RECONSTITUTED, ORAL ORAL DAILY
Qty: 1 KIT | Refills: 0 | Status: SHIPPED | OUTPATIENT
Start: 2023-03-22 | End: 2023-03-24

## 2023-03-22 NOTE — TELEPHONE ENCOUNTER
Returned call to pt who stated she still has not had a bowel movement and is requesting Suprep. Pt stated this has been done for her in the past and has been effective.

## 2023-03-22 NOTE — TELEPHONE ENCOUNTER
Please see if she is taking Motegrity. According to her med list, she hasn't gotten it refilled since it was prescribed in January. It also looks like she took Suprep for her colonoscopy and the prep was fair. Not sure suprep will do enough, but will send it in. She needs to schedule a follow up in clinic with Dr. Montes De Oca to consider additional studies for constipation.  Ronny Melgoza PA-C.

## 2023-03-22 NOTE — TELEPHONE ENCOUNTER
----- Message from Roselyn York sent at 3/22/2023  2:02 PM CDT -----  Contact: self  ..Type:  Needs Medical Advice    Who Called:  .No patient name on file.  Symptoms (please be specific):  severe constipation    How long has patient had these symptoms: 3 weeks   Pharmacy name and phone #: .  [facility]  [address]  [place] [address]  Phone: [phone] Fax: [phone]  Would the patient rather a call back or a response via [facility]?  Call back   Best Call Back Number:  [phone]  Additional Information: pt states she called on yesterday and left a message and no one has returned her call

## 2023-04-05 ENCOUNTER — TELEPHONE (OUTPATIENT)
Dept: PHARMACY | Facility: CLINIC | Age: 53
End: 2023-04-05
Payer: MEDICAID

## 2023-04-06 ENCOUNTER — TELEPHONE (OUTPATIENT)
Dept: PHARMACY | Facility: CLINIC | Age: 53
End: 2023-04-06
Payer: MEDICAID

## 2023-04-10 ENCOUNTER — TELEPHONE (OUTPATIENT)
Dept: RHEUMATOLOGY | Facility: CLINIC | Age: 53
End: 2023-04-10
Payer: MEDICAID

## 2023-04-10 ENCOUNTER — TELEPHONE (OUTPATIENT)
Dept: PHARMACY | Facility: CLINIC | Age: 53
End: 2023-04-10
Payer: MEDICAID

## 2023-04-10 NOTE — TELEPHONE ENCOUNTER
----- Message from RT Emi sent at 4/10/2023 10:31 AM CDT -----  Regarding: ASAP Creatinine  Please order and schedule ASAP Creat prior to her MRI on 04/12/2023.  If labs are same day she needs to arrive 1 hour earlier.    Thank you

## 2023-04-11 ENCOUNTER — TELEPHONE (OUTPATIENT)
Dept: RHEUMATOLOGY | Facility: CLINIC | Age: 53
End: 2023-04-11
Payer: MEDICAID

## 2023-04-11 ENCOUNTER — LAB VISIT (OUTPATIENT)
Dept: LAB | Facility: HOSPITAL | Age: 53
End: 2023-04-11
Attending: PHYSICIAN ASSISTANT
Payer: MEDICAID

## 2023-04-11 DIAGNOSIS — M79.7 FIBROMYALGIA: ICD-10-CM

## 2023-04-11 LAB
ALBUMIN SERPL BCP-MCNC: 4.1 G/DL (ref 3.5–5.2)
ALP SERPL-CCNC: 64 U/L (ref 55–135)
ALT SERPL W/O P-5'-P-CCNC: 14 U/L (ref 10–44)
ANION GAP SERPL CALC-SCNC: 10 MMOL/L (ref 8–16)
AST SERPL-CCNC: 11 U/L (ref 10–40)
BILIRUB SERPL-MCNC: 0.2 MG/DL (ref 0.1–1)
BUN SERPL-MCNC: 13 MG/DL (ref 6–20)
CALCIUM SERPL-MCNC: 10.3 MG/DL (ref 8.7–10.5)
CHLORIDE SERPL-SCNC: 101 MMOL/L (ref 95–110)
CO2 SERPL-SCNC: 28 MMOL/L (ref 23–29)
CREAT SERPL-MCNC: 1.1 MG/DL (ref 0.5–1.4)
EST. GFR  (NO RACE VARIABLE): >60 ML/MIN/1.73 M^2
GLUCOSE SERPL-MCNC: 98 MG/DL (ref 70–110)
POTASSIUM SERPL-SCNC: 4.2 MMOL/L (ref 3.5–5.1)
PROT SERPL-MCNC: 7.7 G/DL (ref 6–8.4)
SODIUM SERPL-SCNC: 139 MMOL/L (ref 136–145)

## 2023-04-11 PROCEDURE — 36415 COLL VENOUS BLD VENIPUNCTURE: CPT | Performed by: PHYSICIAN ASSISTANT

## 2023-04-11 PROCEDURE — 80053 COMPREHEN METABOLIC PANEL: CPT | Performed by: PHYSICIAN ASSISTANT

## 2023-04-11 NOTE — TELEPHONE ENCOUNTER
----- Message from Katerina Hernandez sent at 4/11/2023  8:49 AM CDT -----  Contact: marj  Patient missed a call from yesterday. Please call her back at [phone].        Thanks  DD

## 2023-04-12 ENCOUNTER — PATIENT MESSAGE (OUTPATIENT)
Dept: RHEUMATOLOGY | Facility: CLINIC | Age: 53
End: 2023-04-12
Payer: MEDICAID

## 2023-04-20 ENCOUNTER — OFFICE VISIT (OUTPATIENT)
Dept: PRIMARY CARE CLINIC | Facility: CLINIC | Age: 53
End: 2023-04-20
Payer: MEDICAID

## 2023-04-20 VITALS
BODY MASS INDEX: 40.23 KG/M2 | TEMPERATURE: 99 F | SYSTOLIC BLOOD PRESSURE: 124 MMHG | HEART RATE: 85 BPM | DIASTOLIC BLOOD PRESSURE: 86 MMHG | HEIGHT: 56 IN | WEIGHT: 178.81 LBS | OXYGEN SATURATION: 98 %

## 2023-04-20 DIAGNOSIS — F41.1 GENERALIZED ANXIETY DISORDER WITH PANIC ATTACKS: ICD-10-CM

## 2023-04-20 DIAGNOSIS — Z79.4 TYPE 2 DIABETES MELLITUS WITH HYPERGLYCEMIA, WITH LONG-TERM CURRENT USE OF INSULIN: Chronic | ICD-10-CM

## 2023-04-20 DIAGNOSIS — I10 ESSENTIAL HYPERTENSION: Primary | Chronic | ICD-10-CM

## 2023-04-20 DIAGNOSIS — F31.81 BIPOLAR 2 DISORDER: ICD-10-CM

## 2023-04-20 DIAGNOSIS — F41.0 GENERALIZED ANXIETY DISORDER WITH PANIC ATTACKS: ICD-10-CM

## 2023-04-20 DIAGNOSIS — E11.65 TYPE 2 DIABETES MELLITUS WITH HYPERGLYCEMIA, WITH LONG-TERM CURRENT USE OF INSULIN: Chronic | ICD-10-CM

## 2023-04-20 DIAGNOSIS — F43.10 PTSD (POST-TRAUMATIC STRESS DISORDER): ICD-10-CM

## 2023-04-20 DIAGNOSIS — E78.2 MIXED HYPERLIPIDEMIA: ICD-10-CM

## 2023-04-20 PROCEDURE — 4010F ACE/ARB THERAPY RXD/TAKEN: CPT | Mod: CPTII,,, | Performed by: FAMILY MEDICINE

## 2023-04-20 PROCEDURE — 99214 OFFICE O/P EST MOD 30 MIN: CPT | Mod: S$PBB,,, | Performed by: FAMILY MEDICINE

## 2023-04-20 PROCEDURE — 3008F PR BODY MASS INDEX (BMI) DOCUMENTED: ICD-10-PCS | Mod: CPTII,,, | Performed by: FAMILY MEDICINE

## 2023-04-20 PROCEDURE — 1159F MED LIST DOCD IN RCRD: CPT | Mod: CPTII,,, | Performed by: FAMILY MEDICINE

## 2023-04-20 PROCEDURE — 3044F PR MOST RECENT HEMOGLOBIN A1C LEVEL <7.0%: ICD-10-PCS | Mod: CPTII,,, | Performed by: FAMILY MEDICINE

## 2023-04-20 PROCEDURE — 99999 PR PBB SHADOW E&M-EST. PATIENT-LVL V: ICD-10-PCS | Mod: PBBFAC,,, | Performed by: FAMILY MEDICINE

## 2023-04-20 PROCEDURE — 3074F SYST BP LT 130 MM HG: CPT | Mod: CPTII,,, | Performed by: FAMILY MEDICINE

## 2023-04-20 PROCEDURE — 3072F LOW RISK FOR RETINOPATHY: CPT | Mod: CPTII,,, | Performed by: FAMILY MEDICINE

## 2023-04-20 PROCEDURE — 3079F DIAST BP 80-89 MM HG: CPT | Mod: CPTII,,, | Performed by: FAMILY MEDICINE

## 2023-04-20 PROCEDURE — 4010F PR ACE/ARB THEARPY RXD/TAKEN: ICD-10-PCS | Mod: CPTII,,, | Performed by: FAMILY MEDICINE

## 2023-04-20 PROCEDURE — 99999 PR PBB SHADOW E&M-EST. PATIENT-LVL V: CPT | Mod: PBBFAC,,, | Performed by: FAMILY MEDICINE

## 2023-04-20 PROCEDURE — 1159F PR MEDICATION LIST DOCUMENTED IN MEDICAL RECORD: ICD-10-PCS | Mod: CPTII,,, | Performed by: FAMILY MEDICINE

## 2023-04-20 PROCEDURE — 3074F PR MOST RECENT SYSTOLIC BLOOD PRESSURE < 130 MM HG: ICD-10-PCS | Mod: CPTII,,, | Performed by: FAMILY MEDICINE

## 2023-04-20 PROCEDURE — 99214 PR OFFICE/OUTPT VISIT, EST, LEVL IV, 30-39 MIN: ICD-10-PCS | Mod: S$PBB,,, | Performed by: FAMILY MEDICINE

## 2023-04-20 PROCEDURE — 3044F HG A1C LEVEL LT 7.0%: CPT | Mod: CPTII,,, | Performed by: FAMILY MEDICINE

## 2023-04-20 PROCEDURE — 3072F PR LOW RISK FOR RETINOPATHY: ICD-10-PCS | Mod: CPTII,,, | Performed by: FAMILY MEDICINE

## 2023-04-20 PROCEDURE — 99215 OFFICE O/P EST HI 40 MIN: CPT | Mod: PBBFAC,PN | Performed by: FAMILY MEDICINE

## 2023-04-20 PROCEDURE — 3079F PR MOST RECENT DIASTOLIC BLOOD PRESSURE 80-89 MM HG: ICD-10-PCS | Mod: CPTII,,, | Performed by: FAMILY MEDICINE

## 2023-04-20 PROCEDURE — 3008F BODY MASS INDEX DOCD: CPT | Mod: CPTII,,, | Performed by: FAMILY MEDICINE

## 2023-04-20 NOTE — PROGRESS NOTES
Subjective:       Patient ID: Yolanda Betst is a 52 y.o. female.    Chief Complaint: Other Misc (HOSPITAL F/U)        History of Present Illness:   Yolanda Betts 52 y.o. female presents today with   HPI     Other Misc     Additional comments: HOSPITAL F/U          Last edited by Travis Nieto MA on 4/20/2023 10:04 AM.        She got out of [facility] admission 2 weeks ago for suicidal ideation and is now in IOP with [facility].  Doing well and complaint with all her med.  BP and A1c are controlled. Monitors it at home  Does not need refill   She gave me her new diagnosis, bipolar 2 and not 1 , JOANN with panic attack and schizoaffective   Past Medical History:   Diagnosis Date    Abnormal Pap smear of cervix     HPV genital warts    Anemia     Anxiety     Arthritis     Asthma 10/11/2016    Bipolar 1 disorder     Diabetes mellitus, type 2     Dyslipidemia associated with type 2 diabetes mellitus 5/13/2019    General anesthetics causing adverse effect in therapeutic use     Genital warts     GERD (gastroesophageal reflux disease)     Herpes simplex virus (HSV) infection     Hyperlipidemia     Hypertension     Hypertension complicating diabetes 5/5/2019    Migraine with aura and without status migrainosus, not intractable 3/21/2016    Mild persistent asthma without complication 10/11/2016    Morbid obesity with body mass index (BMI) of 45.0 to 49.9 in adult 8/3/2017    Obstructive sleep apnea     ALEN (obstructive sleep apnea)     2L per N/C q HS    Schizoaffective disorder, bipolar type 4/25/2019    Seasonal allergic rhinitis due to pollen 5/13/2019    Type 2 diabetes mellitus with hyperglycemia, with long-term current use of insulin 12/21/2017    Type 2 diabetes mellitus with hyperglycemia, without long-term current use of insulin 12/21/2017     Family History   Problem Relation Age of Onset    Diabetes Mother     Hyperlipidemia Mother     Hypertension Mother     Asthma Mother     COPD Mother      "Glaucoma Mother     Thyroid disease Mother     Anesthesia problems Mother         "almost had a cardiac arrest" , blood clots    Hypertension Father     Hyperlipidemia Father     Cancer Father         Brain, lung, liver, kidney    Heart disease Maternal Grandmother     Hyperlipidemia Maternal Grandmother     Hypertension Maternal Grandmother     Cataracts Maternal Grandmother     Diabetes Maternal Grandmother     Heart disease Maternal Grandfather     Hyperlipidemia Maternal Grandfather     Hypertension Maternal Grandfather     Glaucoma Maternal Grandfather     Cancer Maternal Grandfather     Cataracts Maternal Grandfather     Macular degeneration Maternal Grandfather     Diabetes Maternal Grandfather     Heart disease Paternal Grandmother     Hyperlipidemia Paternal Grandmother     Hypertension Paternal Grandmother     Cataracts Paternal Grandmother     Heart disease Paternal Grandfather     Hyperlipidemia Paternal Grandfather     Hypertension Paternal Grandfather     Cataracts Paternal Grandfather     Breast cancer Maternal Cousin     Breast cancer Maternal Cousin     Breast cancer Maternal Cousin     Breast cancer Maternal Cousin      Social History     Socioeconomic History    Marital status: Single   Tobacco Use    Smoking status: Never    Smokeless tobacco: Never   Substance and Sexual Activity    Alcohol use: Yes     Alcohol/week: 0.0 standard drinks     Comment: socially  No alcohol 72h prior to sx    Drug use: No    Sexual activity: Yes     Partners: Male     Birth control/protection: Surgical     Comment: john   Social History Narrative    Long-term care nurse     Social Determinants of Health     Financial Resource Strain: High Risk    Difficulty of Paying Living Expenses: Very hard   Food Insecurity: Food Insecurity Present    Worried About Running Out of Food in the Last Year: Sometimes true    Ran Out of Food in the Last Year: Sometimes true   Transportation Needs: No Transportation Needs    Lack of " "Transportation (Medical): No    Lack of Transportation (Non-Medical): No   Physical Activity: Inactive    Days of Exercise per Week: 0 days    Minutes of Exercise per Session: 0 min   Stress: Stress Concern Present    Feeling of Stress : Very much   Social Connections: Moderately Isolated    Frequency of Communication with Friends and Family: More than three times a week    Frequency of Social Gatherings with Friends and Family: More than three times a week    Attends Holiness Services: More than 4 times per year    Active Member of Clubs or Organizations: No    Attends Club or Organization Meetings: Never    Marital Status:    Housing Stability: High Risk    Unable to Pay for Housing in the Last Year: Yes    Number of Places Lived in the Last Year: 1    Unstable Housing in the Last Year: No     Outpatient Encounter Medications as of 4/20/2023   Medication Sig Dispense Refill    albuterol (PROVENTIL/VENTOLIN HFA) 90 mcg/actuation inhaler Inhale 2 puffs into the lungs every 6 hours as needed for wheezing 8.5 g 3    albuterol (PROVENTIL/VENTOLIN HFA) 90 mcg/actuation inhaler Inhale 2 puffs into the lungs every 6 hours as needed 8.5 g 0    ALPRAZolam (XANAX XR) 2 MG Tb24 Take 1 tablet by mouth at bedtime 30 tablet 1    amlodipine-valsartan (EXFORGE)  mg per tablet Take 1 tablet by mouth once daily. 90 tablet 3    aspirin (ECOTRIN) 81 MG EC tablet Take 81 mg by mouth once daily.      atorvastatin (LIPITOR) 20 MG tablet Take 1 tablet (20 mg total) by mouth every evening. 90 tablet 3    BD INSULIN SYRINGE ULTRA-FINE 1/2 mL 30 gauge x 1/2" Syrg   0    blood sugar diagnostic (ONETOUCH ULTRA TEST) Strp Check blood glucose 4 times daily as directed and as needed (dispense insurance preferred brand or patient choice) 200 each 5    blood sugar diagnostic Strp 1 each by Misc.(Non-Drug; Combo Route) route 4 (four) times daily. [facility] Glucose Test Strips 400 strip 3    budesonide-formoterol 160-4.5 mcg (SYMBICORT) " 160-4.5 mcg/actuation HFAA Inhale 2 puffs into the lungs every 12 (twelve) hours. Controller : Use spacer 10.2 g 6    butalbital-acetaminophen-caffeine -40 mg (FIORICET, ESGIC) -40 mg per tablet Take by mouth.      cycloSPORINE (RESTASIS) 0.05 % ophthalmic emulsion Place 1 drop into both eyes twice daily 60 each 0    diclofenac sodium (VOLTAREN) 1 % Gel Apply topically to affected areas 4 times daily 100 g 0    DULoxetine (CYMBALTA) 60 MG capsule Take 1 capsule by mouth at bedtime 30 capsule 1    ergocalciferol (ERGOCALCIFEROL) 50,000 unit Cap Take 1 capsule twice weekly for 3 weeks then weekly 12 capsule 3    estradioL (ESTRACE) 0.01 % (0.1 mg/gram) vaginal cream Place 1 g vaginally once daily. 42.5 g 1    eszopiclone (LUNESTA) 1 MG Tab Take 1 tablet (1 mg total) by mouth at bedtime as needed 30 tablet 0    fenofibrate (TRICOR) 145 MG tablet Take 1 tablet (145 mg total) by mouth once daily. 90 tablet 3    flash glucose scanning reader ([employer] AMY 2 READER) Misc Use as directed to check blood sugar 1 each 1    fluticasone propionate (FLONASE) 50 mcg/actuation nasal spray 2 sprays (100 mcg total) by Each Nostril route once daily. 16 g 0    frovatriptan (FROVA) 2.5 MG tablet Take 1 tablet at onset of acute migraine. May take 1 more tablet 2 hours later if needed. (MAX 2 TABLET PER DAY. MAX 4 TABLETS PER WEEK.) 9 tablet 1    furosemide (LASIX) 20 MG tablet Take 1 tablet (20 mg total) by mouth once daily. 90 tablet 3    insulin aspart U-100 (NOVOLOG FLEXPEN U-100 INSULIN) 100 unit/mL (3 mL) InPn pen Inject 10 Units into the skin 3 (three) times daily before meals. Use based on sliding scale 15 mL 3    insulin glargine U-300 conc (TOUJEO MAX U-300 SOLOSTAR) 300 unit/mL (3 mL) insulin pen Inject 92 Units into the skin once daily. 4 pen 5    lamoTRIgine (LAMICTAL) 150 MG Tab Take 1 tablet by mouth at bedtime 30 tablet 1    lancets (ONETOUCH DELICA PLUS LANCET) 30 gauge Misc Use as directed 3 times daily  100 each 11    levocetirizine (XYZAL) 5 MG tablet Take 1 tablet (5 mg total) by mouth every evening. 30 tablet 11    LIDOcaine-prilocaine (EMLA) cream Apply topically up to 4 times per day to affected area for pain relief 60 g 0    lurasidone (LATUDA) 60 mg Tab tablet Take 1 tablet by mouth daily with 350 calories 30 tablet 2    magnesium oxide (MAG-OX) 400 mg (241.3 mg magnesium) tablet Take 1 tablet (400 mg total) by mouth once daily. 90 tablet 3    metFORMIN (GLUCOPHAGE) 1000 MG tablet TAKE 1 TABLET(1000 MG) BY MOUTH TWICE DAILY WITH MEALS 180 tablet 1    metoprolol succinate (TOPROL-XL) 50 MG 24 hr tablet Take 1 tablet (50 mg total) by mouth every evening. 90 tablet 3    omeprazole (PRILOSEC) 40 MG capsule Take 1 capsule (40 mg total) by mouth once daily. 30 capsule 11    pneumoc 20-tatiana conj-dip cr,PF, (PREVNAR-20, PF,) 0.5 mL Syrg injection Inject 0.5 mLs into the muscle. 0.5 mL 0    promethazine (PHENERGAN) 12.5 MG Tab       prucalopride (MOTEGRITY) 2 mg Tab Take 1 tablet (2 mg) by mouth once daily. 30 tablet 11    semaglutide (OZEMPIC) 1 mg/dose (4 mg/3 mL) Inject 1 mg into the skin every 7 days. 1 pen 5    spironolactone (ALDACTONE) 25 MG tablet Take 1 tablet (25 mg total) by mouth once daily. 90 tablet 3    tiZANidine (ZANAFLEX) 4 MG tablet Take 2 tablets by mouth every 6 hours as needed 240 tablet 0    TRULANCE 3 mg Tab TAKE 1 TABLET BY MOUTH EVERY DAY 90 tablet 0    amLODIPine (NORVASC) 10 MG tablet Take 1 tablet by mouth once daily (Patient not taking: Reported on 4/20/2023) 30 tablet 0    amlodipine-valsartan (EXFORGE)  mg per tablet Take 1 tablet by mouth daily 30 tablet 0    aspirin (ECOTRIN) 81 MG EC tablet Take 1 tablet by mouth daily 30 tablet 0    atorvastatin (LIPITOR) 20 MG tablet Take 1 tablet by mouth daily at bedtime 30 tablet 0    budesonide-formoterol 160-4.5 mcg (SYMBICORT) 160-4.5 mcg/actuation HFAA Inhale 2 puffs into the lungs every 12 hours 10.2 g 0     butalbital-acetaminophen-caffeine -40 mg (FIORICET, ESGIC) -40 mg per tablet Take 1 tablet by mouth every 4 hours as needed for headache 30 tablet 3    cycloSPORINE 0.05 % Drop Place 1 drop into both eyes 2 (two) times daily. (Patient not taking: Reported on 4/20/2023) 5.5 mL 11    DULoxetine (CYMBALTA) 30 MG capsule Take 1 capsule by mouth once daily (Patient not taking: Reported on 4/20/2023) 30 capsule 1    DULoxetine (CYMBALTA) 60 MG capsule Take 1 capsule (60 mg total) by mouth once daily. 30 capsule 2    DULoxetine (CYMBALTA) 60 MG capsule Take 1 capsule by mouth twice daily 60 capsule 1    fenofibrate (TRICOR) 145 MG tablet Take 1 tablet by mouth daily 30 tablet 0    furosemide (LASIX) 20 MG tablet Take 1 tablet by mouth daily 30 tablet 0    HEALTHYLAX 17 gram PwPk Take 17 g by mouth 2 (two) times daily.      lamoTRIgine (LAMICTAL) 100 MG tablet Take 1 tablet by mouth at bedtime (Patient not taking: Reported on 4/20/2023) 30 tablet 1    lamoTRIgine (LAMICTAL) 200 MG tablet Take 1 tablet by mouth at bedtime (Patient not taking: Reported on 4/20/2023) 30 tablet 0    levocetirizine (XYZAL) 5 MG tablet Take 1 tablet by mouth daily at bedtime 30 tablet 0    magnesium oxide (MAG-OX) 400 mg (241.3 mg magnesium) tablet Take 1 tablet by mouth daily 30 tablet 0    metFORMIN (GLUCOPHAGE) 1000 MG tablet Take 1 tablet by mouth twice daily 60 tablet 0    metoprolol succinate (TOPROL-XL) 50 MG 24 hr tablet Take 1 tablet by mouth at bedtime 30 tablet 0    mirtazapine (REMERON) 15 MG tablet Take 1 tablet by mouth at bedtime (Patient not taking: Reported on 4/20/2023) 30 tablet 1    mirtazapine (REMERON) 7.5 MG Tab Take 7.5 mg by mouth every evening.      mirtazapine (REMERON) 7.5 MG Tab Take 1 tablet by mouth at bedtime (Patient not taking: Reported on 4/20/2023) 30 tablet 0    naltrexone capsule Take 1 capsule (4.5 mg total) by mouth every evening. (Patient not taking: Reported on 4/20/2023) 90 capsule 2     "naproxen (NAPROSYN) 500 MG tablet Take 1 tablet (500 mg total) by mouth 2 (two) times daily. (Patient not taking: Reported on 4/20/2023) 60 tablet 1    omeprazole (PRILOSEC) 40 MG capsule Take 1 capsule by mouth daily 30 capsule 0    pen needle, diabetic (BD ULTRA-FINE MINI PEN NEEDLE) 31 gauge x 3/16" Ndle Use 5 a day 200 each 3    pen needle, diabetic (BD ULTRA-FINE MINI PEN NEEDLE) 31 gauge x 3/16" Ndle Use one needle 5 times a day 200 each 11    spironolactone (ALDACTONE) 25 MG tablet Take 1 tablet by mouth once daily 30 tablet 0    [DISCONTINUED] clonazePAM (KLONOPIN) 1 MG tablet Take 1 tablet by mouth daily 30 tablet 0    [DISCONTINUED] glucagon, human recombinant, (GLUCAGON EMERGENCY KIT, HUMAN,) 1 mg SolR Inject 1 mg into the muscle as needed. Emergency use 1 each 1    [DISCONTINUED] valsartan (DIOVAN) 320 MG tablet Take 1 tablet (320 mg total) by mouth once daily. 90 tablet 3     No facility-administered encounter medications on file as of 4/20/2023.       Review of Systems      A complete 10 point ROS was completed and are positive as per above HPI.    Otherwise negative for fever, diplopia, chest pain, shortness of breath, vomiting, blood in urine, joint pain, skin rash, seizures and unusual bleeding.     Objective:      /86 (BP Location: Left arm, Patient Position: Sitting, BP Method: Medium (Manual))   Pulse 85   Temp 99 °F (37.2 °C) (Oral)   Ht 4' 8" (1.422 m)   Wt 81.1 kg (178 lb 12.8 oz)   SpO2 98%   BMI 40.09 kg/m²   Physical Exam  Psychiatric:         Thought Content: Thought content does not include suicidal ideation. Thought content does not include homicidal or suicidal plan.       CONSTITUTIONAL: No apparent distress. Appears comfortable. Does not appear acutely ill or septic. Appears adequately hydrated.  CARDIOVASCULAR: No perioral cyanosis  PULMONARY: Breathing unlabored. No retractions Chest expansion grossly normal.  PSYCHIATRIC: Alert and conversant and grossly oriented. Mood " is grossly neutral. Affect appropriate.   NEUROLOGIC: No focal sensory deficits reported.   Results for orders placed or performed in visit on 04/11/23   Comprehensive Metabolic Panel   Result Value Ref Range    Sodium 139 136 - 145 mmol/L    Potassium 4.2 3.5 - 5.1 mmol/L    Chloride 101 95 - 110 mmol/L    CO2 28 23 - 29 mmol/L    Glucose 98 70 - 110 mg/dL    BUN 13 6 - 20 mg/dL    Creatinine 1.1 0.5 - 1.4 mg/dL    Calcium 10.3 8.7 - 10.5 mg/dL    Total Protein 7.7 6.0 - 8.4 g/dL    Albumin 4.1 3.5 - 5.2 g/dL    Total Bilirubin 0.2 0.1 - 1.0 mg/dL    Alkaline Phosphatase 64 55 - 135 U/L    AST 11 10 - 40 U/L    ALT 14 10 - 44 U/L    Anion Gap 10 8 - 16 mmol/L    eGFR >60 >60 mL/min/1.73 m^2     *Note: Due to a large number of results and/or encounters for the requested time period, some results have not been displayed. A complete set of results can be found in Results Review.     Assessment:       1. Essential hypertension    2. Mixed hyperlipidemia    3. Type 2 diabetes mellitus with hyperglycemia, with long-term current use of insulin    4. PTSD (post-traumatic stress disorder)    5. Bipolar 2 disorder    6. Generalized anxiety disorder with panic attacks        Plan:   Essential hypertension  Comments:  controlled no change    Mixed hyperlipidemia  -     Lipids Digital Medicine (LDMP) Enrollment Order  -     Lipids Digital Medicine (LDMP) Enrollment Order    Type 2 diabetes mellitus with hyperglycemia, with long-term current use of insulin    PTSD (post-traumatic stress disorder)    Bipolar 2 disorder    Generalized anxiety disorder with panic attacks    I have reviewed all of the patient's clinical history available in care everywhere and Epic and have utilized this in my evaluation and management recommendations today.     Treatment options and alternatives were discussed with the patient. Patient was given ample time to ask questions. All questions were answered. Voices understanding and acceptance of this  advice. Will call back if any further questions or concerns.    Sasha Sorenson MD

## 2023-04-21 ENCOUNTER — TELEPHONE (OUTPATIENT)
Dept: DIABETES | Facility: CLINIC | Age: 53
End: 2023-04-21
Payer: MEDICAID

## 2023-04-21 DIAGNOSIS — E11.65 TYPE 2 DIABETES MELLITUS WITH HYPERGLYCEMIA, WITH LONG-TERM CURRENT USE OF INSULIN: Primary | ICD-10-CM

## 2023-04-21 DIAGNOSIS — Z79.4 TYPE 2 DIABETES MELLITUS WITH HYPERGLYCEMIA, WITH LONG-TERM CURRENT USE OF INSULIN: Primary | ICD-10-CM

## 2023-04-21 RX ORDER — SEMAGLUTIDE 2.68 MG/ML
2 INJECTION, SOLUTION SUBCUTANEOUS
Qty: 3 ML | Refills: 5 | Status: SHIPPED | OUTPATIENT
Start: 2023-04-21 | End: 2023-10-17 | Stop reason: SDUPTHER

## 2023-04-21 NOTE — TELEPHONE ENCOUNTER
----- Message from Elizabeth Ceron NP sent at 4/21/2023 11:58 AM CDT -----  Contact: Yolanda  Hi, let her know Ozempic was increased to 2 mg once weekly, new rx was sent  ----- Message -----  From: Gely Blair MA  Sent: 4/20/2023   3:11 PM CDT  To: Elizabeth Ceron NP      ----- Message -----  From: Graciela Pfeiffer  Sent: 4/20/2023   1:39 PM CDT  To: [facility] Staff    Patient is calling to speak with the nurse in regards to medication. Reports needing to increase the semaglutide (OZEMPIC) 1 mg/dose (4 mg/3 mL). Please give patient a callback at [phone].

## 2023-04-21 NOTE — TELEPHONE ENCOUNTER
Called patient to inform her that the Ozempic 2 mg was sent to the [facility]   Patient voiced understanding

## 2023-04-26 ENCOUNTER — TELEPHONE (OUTPATIENT)
Dept: DIABETES | Facility: CLINIC | Age: 53
End: 2023-04-26
Payer: MEDICAID

## 2023-04-26 ENCOUNTER — TELEPHONE (OUTPATIENT)
Dept: PRIMARY CARE CLINIC | Facility: CLINIC | Age: 53
End: 2023-04-26
Payer: MEDICAID

## 2023-04-26 NOTE — TELEPHONE ENCOUNTER
Pt contacted, BG was 76 now 67, denies illness, likely due to increasing Ozempic, she understands the hypoglycemia protocol- ate a piece of a snicker bar 10 mins ago. Advised to lower Toujeo to 86 units (she is taking 92 units). I explained that the she needs to eat/drink a faster acting sugar- hard candy, 4 oz of juice/soda, glucose tab now. For breakfast she had several boiled eggs and a cup of fruit. I advised her to eat an additional carb. She voiced understanding. If BG continue to drop despite tx and/or symptomatic- call EMS.

## 2023-04-26 NOTE — TELEPHONE ENCOUNTER
----- Message from Ly Singletary sent at 4/26/2023  1:09 PM CDT -----  Regarding: patient call back  Name of Who is Calling: PILO YANCEY [[id number]]      What is the request in detail: Patient is requesting to speak with a nurse in the office. States her BP has been going up and down. Also stated she is not feeling well. I offered to transfer her call to an on call triage nurse but patient declined. Please advise!        Can the clinic reply by [facility]: NO        What Number to Call Back if not in [facility]: [phone]

## 2023-04-26 NOTE — TELEPHONE ENCOUNTER
----- Message from Susan Matta sent at 4/26/2023 10:44 AM CDT -----  Contact: [phone]  Pt is calling regarding blood sugar , states its low . Please call back at [phone] . Thanks/aamir

## 2023-05-01 ENCOUNTER — TELEPHONE (OUTPATIENT)
Dept: DIABETES | Facility: CLINIC | Age: 53
End: 2023-05-01
Payer: MEDICAID

## 2023-05-01 NOTE — TELEPHONE ENCOUNTER
----- Message from Sierra Hudson sent at 5/1/2023 10:17 AM CDT -----  Contact: Yolanda tyra [phone]  1MEDICALADVICE     Patient is calling for Medical Advice regarding:    How long has patient had these symptoms:    Pharmacy name and phone#:    Would like response via [facility]: call back    Comments: Pt is requesting a call back from the nurse regarding medication dosages that she is currently using

## 2023-05-01 NOTE — TELEPHONE ENCOUNTER
Dropped toujeo to 70 units last night, the whole weekend her sugars were low, 60-70 (fasting) on Saturday but she only checked them once. She was weak the whole weekend, went to the ER but they were busy. Today her blood sugar was 93.

## 2023-05-01 NOTE — TELEPHONE ENCOUNTER
Per Elizabeth,     Since she just changed the dose to 70 units, she can continue that for the next ~3 days. If the sugars drop below 70 after the 3 days, lower the Toujeo by 4 more units. Continue to lower using this method. ( Below 70= lower by 4 units and wait 3 days). Each meal should contain 30 grams of carbohydrates.     Patient informed and verbalized understanding. She will keep us posted

## 2023-05-15 ENCOUNTER — TELEPHONE (OUTPATIENT)
Dept: PAIN MEDICINE | Facility: CLINIC | Age: 53
End: 2023-05-15
Payer: MEDICAID

## 2023-05-15 NOTE — TELEPHONE ENCOUNTER
----- Message from Lakisha Miller sent at 5/15/2023  9:47 AM CDT -----  Regarding: Referral Request  Contact: Thania  .Type:  Patient Requesting Referral    Who Called: Thania   Does the patient already have the specialty appointment scheduled?: Yes  If yes, what is the date of that appointment?: 05/18/2023  Referral to What Specialty:  Reason for Referral: pain  Does the patient want the referral with a specific physician?:   Chiropractor  Jose Engle  [address], [place] [address] [phone]  [facility] the [facility]  Patient Requesting a Response?: yes  Would the patient rather a call back or a response via [facility]? Call   Best Call Back Number: [phone] (home)    Additional Information:

## 2023-05-15 NOTE — TELEPHONE ENCOUNTER
Pt called to get a referral for Chiro, informed pt our department does not refer to Chiro. Pt voiced understanding.  .Sundar Javier CCMA

## 2023-05-16 DIAGNOSIS — Z79.4 TYPE 2 DIABETES MELLITUS WITH HYPERGLYCEMIA, WITH LONG-TERM CURRENT USE OF INSULIN: ICD-10-CM

## 2023-05-16 DIAGNOSIS — E11.65 TYPE 2 DIABETES MELLITUS WITH HYPERGLYCEMIA, WITH LONG-TERM CURRENT USE OF INSULIN: ICD-10-CM

## 2023-05-16 RX ORDER — INSULIN GLARGINE 300 U/ML
70 INJECTION, SOLUTION SUBCUTANEOUS DAILY
Qty: 6 ML | Refills: 2 | Status: SHIPPED | OUTPATIENT
Start: 2023-05-16 | End: 2023-06-07 | Stop reason: SDUPTHER

## 2023-05-16 NOTE — TELEPHONE ENCOUNTER
Care Due:                  Date            Visit Type   Department     Provider  --------------------------------------------------------------------------------                                [facility] PRIMARY  Last Visit: 04-      FOLLOW UP    CARE           Sasha Sorenson                              ESTABLISHED   [facility] PRIMARY  Next Visit: 06-      PATIENT      CARE           Sasha Sorenson                                                            Last  Test          Frequency    Reason                     Performed    Due Date  --------------------------------------------------------------------------------    HBA1C.......  6 months...  insulin..................  01- 07-    [facility] Embedded Care Due Messages. Reference number: [id number].   5/16/2023 11:55:58 AM SAMMYT

## 2023-05-19 RX ORDER — METFORMIN HYDROCHLORIDE 1000 MG/1
TABLET ORAL
Qty: 60 TABLET | Refills: 2 | Status: SHIPPED | OUTPATIENT
Start: 2023-05-19 | End: 2023-06-13 | Stop reason: SDUPTHER

## 2023-05-30 ENCOUNTER — TELEPHONE (OUTPATIENT)
Dept: DIABETES | Facility: CLINIC | Age: 53
End: 2023-05-30
Payer: MEDICAID

## 2023-05-30 NOTE — TELEPHONE ENCOUNTER
----- Message from Shikha Crisostomo sent at 5/30/2023  2:58 PM CDT -----  Pt request a nurse to give her a call, she is trying to get Dexcom ,Please call back at [phone].Thanks

## 2023-05-30 NOTE — TELEPHONE ENCOUNTER
----- Message from Shikha Crisostomo sent at 5/30/2023  2:58 PM CDT -----  Pt request a nurse to give her a call, she is trying to get Dexcom ,Please call back at [phone].Thanks

## 2023-06-05 ENCOUNTER — TELEPHONE (OUTPATIENT)
Dept: PSYCHIATRY | Facility: CLINIC | Age: 53
End: 2023-06-05
Payer: MEDICAID

## 2023-06-05 NOTE — TELEPHONE ENCOUNTER
----- Message from Cristina Cabrera sent at 6/5/2023  9:37 AM CDT -----  Contact: [phone]    Patient: Yolanda Betts-    Reason: The patient is requesting a call back from the nurse to get assistance with scheduling an     appointment for behavioral health.     Comments: Please call the patient back to advise.

## 2023-06-07 ENCOUNTER — TELEPHONE (OUTPATIENT)
Dept: GASTROENTEROLOGY | Facility: CLINIC | Age: 53
End: 2023-06-07

## 2023-06-07 ENCOUNTER — OFFICE VISIT (OUTPATIENT)
Dept: DIABETES | Facility: CLINIC | Age: 53
End: 2023-06-07
Payer: MEDICAID

## 2023-06-07 ENCOUNTER — TELEPHONE (OUTPATIENT)
Dept: GASTROENTEROLOGY | Facility: CLINIC | Age: 53
End: 2023-06-07
Payer: MEDICAID

## 2023-06-07 DIAGNOSIS — I15.2 HYPERTENSION COMPLICATING DIABETES: Chronic | ICD-10-CM

## 2023-06-07 DIAGNOSIS — E78.5 DYSLIPIDEMIA ASSOCIATED WITH TYPE 2 DIABETES MELLITUS: Chronic | ICD-10-CM

## 2023-06-07 DIAGNOSIS — E11.65 TYPE 2 DIABETES MELLITUS WITH HYPERGLYCEMIA, WITH LONG-TERM CURRENT USE OF INSULIN: Primary | ICD-10-CM

## 2023-06-07 DIAGNOSIS — E11.69 DYSLIPIDEMIA ASSOCIATED WITH TYPE 2 DIABETES MELLITUS: Chronic | ICD-10-CM

## 2023-06-07 DIAGNOSIS — E11.59 HYPERTENSION COMPLICATING DIABETES: Chronic | ICD-10-CM

## 2023-06-07 DIAGNOSIS — Z79.4 TYPE 2 DIABETES MELLITUS WITH HYPERGLYCEMIA, WITH LONG-TERM CURRENT USE OF INSULIN: Primary | ICD-10-CM

## 2023-06-07 PROCEDURE — 3044F PR MOST RECENT HEMOGLOBIN A1C LEVEL <7.0%: ICD-10-PCS | Mod: CPTII,95,, | Performed by: NURSE PRACTITIONER

## 2023-06-07 PROCEDURE — 1159F PR MEDICATION LIST DOCUMENTED IN MEDICAL RECORD: ICD-10-PCS | Mod: CPTII,95,, | Performed by: NURSE PRACTITIONER

## 2023-06-07 PROCEDURE — 1160F RVW MEDS BY RX/DR IN RCRD: CPT | Mod: CPTII,95,, | Performed by: NURSE PRACTITIONER

## 2023-06-07 PROCEDURE — 4010F PR ACE/ARB THEARPY RXD/TAKEN: ICD-10-PCS | Mod: CPTII,95,, | Performed by: NURSE PRACTITIONER

## 2023-06-07 PROCEDURE — 1159F MED LIST DOCD IN RCRD: CPT | Mod: CPTII,95,, | Performed by: NURSE PRACTITIONER

## 2023-06-07 PROCEDURE — 3072F LOW RISK FOR RETINOPATHY: CPT | Mod: CPTII,95,, | Performed by: NURSE PRACTITIONER

## 2023-06-07 PROCEDURE — 4010F ACE/ARB THERAPY RXD/TAKEN: CPT | Mod: CPTII,95,, | Performed by: NURSE PRACTITIONER

## 2023-06-07 PROCEDURE — 3044F HG A1C LEVEL LT 7.0%: CPT | Mod: CPTII,95,, | Performed by: NURSE PRACTITIONER

## 2023-06-07 PROCEDURE — 99213 PR OFFICE/OUTPT VISIT, EST, LEVL III, 20-29 MIN: ICD-10-PCS | Mod: 95,,, | Performed by: NURSE PRACTITIONER

## 2023-06-07 PROCEDURE — 3072F PR LOW RISK FOR RETINOPATHY: ICD-10-PCS | Mod: CPTII,95,, | Performed by: NURSE PRACTITIONER

## 2023-06-07 PROCEDURE — 1160F PR REVIEW ALL MEDS BY PRESCRIBER/CLIN PHARMACIST DOCUMENTED: ICD-10-PCS | Mod: CPTII,95,, | Performed by: NURSE PRACTITIONER

## 2023-06-07 PROCEDURE — 99213 OFFICE O/P EST LOW 20 MIN: CPT | Mod: 95,,, | Performed by: NURSE PRACTITIONER

## 2023-06-07 RX ORDER — INSULIN GLARGINE 300 U/ML
84 INJECTION, SOLUTION SUBCUTANEOUS DAILY
Qty: 6 ML | Refills: 2
Start: 2023-06-07 | End: 2023-06-14 | Stop reason: SDUPTHER

## 2023-06-07 NOTE — TELEPHONE ENCOUNTER
informed that there is no current availability for medicaid pts and that a referral has been entered by Ruby Olvera for her to report to [facility]. Pt verbalized understanding and was given the phone number to [facility] to f/u on appointment.

## 2023-06-07 NOTE — TELEPHONE ENCOUNTER
----- Message from Steven Isbell MA sent at 6/7/2023  9:48 AM CDT -----  Contact: [phone]  Patient called              In regards to retuning a phone call to speak back with staff.            Call back [phone]

## 2023-06-07 NOTE — TELEPHONE ENCOUNTER
----- Message from Yudy Escobedo sent at 6/7/2023  9:13 AM CDT -----  Contact: Yolanda  Type:  Sooner Apoointment Request    Caller is requesting a sooner appointment. Caller will not accept being placed on the waitlist and is requesting a message be sent to doctor.  Name of Caller:Yolanda  When is the first available appointment?unknown  Symptoms:Abdominal swelling/Constipation/Pain  Would the patient rather a call back or a response via [facility]? call  Best Call Back Number:[phone]   Additional Information: Patient request to schedule an appointment sooner than next available.  Thank you,  [facility]

## 2023-06-07 NOTE — PROGRESS NOTES
Subjective:         Patient ID: Yolanda Betts is a 53 y.o. female.  Patient's current PCP is Sasha Sorenson MD.       Chief Complaint: No chief complaint on file.      HPI  Yolanda Betts is a 53 y.o. Black or  female presenting for a follow up for diabetes. Patient has been diagnosed with diabetes for > 10 years.     Complications related to diabetes:  HTN, Hyperlipidemia, peripheral neuropathy; denies Pancreatitis; denies Gastroparesis; denies DKA; denies Hx/family Hx of MEN2/MTC; reports Frequent UTIs/yeast infections; Bariatric surgery 6/1/21.     Past failed treatment include:  Jardiance- multiple yeast infections; Victoza - GI upset    Blood glucose testing is performed regularly, reports fasting BG wnl, no hypoglycemia, CGM - No, Dexcom approved and waiting on shipment     Compliance:The patient reports medication and diet compliance most of the time.       The patient location is: home, [place]  The chief complaint leading to consultation is: DM follow up    Visit type: audiovisual    Face to Face time with patient: 20 minutes of total time spent on the encounter, which includes face to face time and non-face to face time preparing to see the patient (eg, review of tests), Obtaining and/or reviewing separately obtained history, Documenting clinical information in the electronic or other health record, Independently interpreting results (not separately reported) and communicating results to the patient/family/caregiver, or Care coordination (not separately reported). Each patient to whom he or she provides medical services by telemedicine is:  (1) informed of the relationship between the physician and patient and the respective role of any other health care provider with respect to management of the patient; and (2) notified that he or she may decline to receive medical services by telemedicine and may withdraw from such care at any time.         //   , There is no height or weight on  file to calculate BMI.  Her blood sugar in clinic today is:   Lab Results   Component Value Date    POCGLU 78 03/10/2023       Labs reviewed and are noted below.  Her most recent A1C is:  Lab Results   Component Value Date    HGBA1C 6.3 (H) 01/13/2023    HGBA1C 6.4 (H) 09/21/2022    HGBA1C 6.2 (H) 03/08/2022     Lab Results   Component Value Date    CPEPTIDE 4.56 02/26/2018     Lab Results   Component Value Date    GLUTAMICACID 0.00 02/26/2018     Glucose   Date Value Ref Range Status   04/11/2023 98 70 - 110 mg/dL Final     Anion Gap   Date Value Ref Range Status   04/11/2023 10 8 - 16 mmol/L Final     eGFR if    Date Value Ref Range Status   03/08/2022 >60.0 >60 mL/min/1.73 m^2 Final     eGFR if non    Date Value Ref Range Status   03/08/2022 >60.0 >60 mL/min/1.73 m^2 Final     Comment:     Calculation used to obtain the estimated glomerular filtration  rate (eGFR) is the CKD-EPI equation.          CURRENT DM MEDICATIONS:     Diabetes Medications               insulin aspart U-100 (NOVOLOG FLEXPEN U-100 INSULIN) 100 unit/mL (3 mL) InPn pen Inject 10 Units into the skin 3 (three) times daily before meals. Use based on sliding scale prn, Not needed regularly    insulin glargine U-300 conc (TOUJEO MAX U-300 SOLOSTAR) 300 unit/mL (3 mL) insulin pen Inject 84 Units into the skin once daily.    metFORMIN (GLUCOPHAGE) 1000 MG tablet TAKE 1 TABLET(1000 MG) BY MOUTH TWICE DAILY WITH MEALS    semaglutide (OZEMPIC) 2 mg/dose (2 mg/1.5 mL) PnIj Inject 2 mg into the skin every 7 days.               Diabetes Management Status    Statin: Taking  ACE/ARB: Taking    Screening or Prevention Patient's value Goal Complete/Controlled?   HgA1C Testing and Control   Lab Results   Component Value Date    HGBA1C 6.3 (H) 01/13/2023      Annually/Less than 8% Yes   Lipid profile : 09/21/2022 Annually Yes   LDL control Lab Results   Component Value Date    LDLCALC 43.8 (L) 09/21/2022    Annually/Less than 100  mg/dl  Yes   Nephropathy screening Lab Results   Component Value Date    LABMICR 5.0 09/21/2022     Lab Results   Component Value Date    PROTEINUA Negative 07/20/2020    Annually Yes   Blood pressure BP Readings from Last 1 Encounters:   04/20/23 124/86    Less than 140/90 Yes   Dilated retinal exam : 12/19/2022 Annually Yes   Foot exam   : 02/16/2023 Annually Yes     Review of Systems   Constitutional:  Negative for activity change and appetite change.   Gastrointestinal:  Positive for constipation. Negative for abdominal pain, diarrhea, nausea and vomiting.        Hx of IBS, GERD   Endocrine: Negative for polydipsia, polyphagia and polyuria.   Musculoskeletal:  Positive for arthralgias.   Neurological:  Negative for syncope and weakness.        Neuropathy   Psychiatric/Behavioral:  Negative for confusion.        Objective:      Physical Exam  Constitutional:       General: She is not in acute distress.     Appearance: She is not ill-appearing.   Neck:      Thyroid: Mass: neg of self exam.   Neurological:      Mental Status: She is alert.   Psychiatric:         Speech: Speech normal.       Assessment:       1. Type 2 diabetes mellitus with hyperglycemia, with long-term current use of insulin    2. Dyslipidemia associated with type 2 diabetes mellitus    3. Hypertension complicating diabetes              Plan:   Type 2 diabetes mellitus with hyperglycemia, with long-term current use of insulin  -     insulin glargine U-300 conc (TOUJEO MAX U-300 SOLOSTAR) 300 unit/mL (3 mL) insulin pen; Inject 84 Units into the skin once daily.  Dispense: 6 mL; Refill: 2  -     Hemoglobin A1C; Future; Expected date: 06/07/2023    Dyslipidemia associated with type 2 diabetes mellitus    Hypertension complicating diabetes           PLAN:   - Condition: good control   - Monitor blood glucose 4x daily. Goals reviewed  - She is working with the RD  - BP and LDL- Recommended lifestyle modifications for management. Encouraged healthy low  fat, low carb diet and increase physical activity  - Medication Changes: Continue Toujeo, Continue Metformin, Continue Ozempic   - Advised to follow up with GI regarding constipation, taking Miralax daily- reviewed fiber and hydration  - The patient was explained the above plan and given opportunity to ask questions.  She understands, chooses and consents to this plan and accepts all the risks, which include but are not limited to the risks mentioned above.   - Labs ordered as above  - Nurse visit:  deferred    - Follow up: 3 months       If blood pre-meal sugar is 151-200 take +2 units of Novolog;  If blood pre-meal sugar is 201-250 take +4 units of Novolog;  If blood pre-meal sugar is 251-300 take +6 units of Novolog;  If blood pre-meal sugar is 301-350 take +8 units of Novolog;  If blood pre-meal sugar is 351-400+ take +10 units of Novolog;      A total of 15 minutes was spent in face to face time, of which over 50% was spent in counseling patient on disease process, complications, treatment, and side effects of medications.

## 2023-06-07 NOTE — TELEPHONE ENCOUNTER
----- Message from Yudy Escobedo sent at 6/7/2023  9:13 AM CDT -----  Contact: Yolanda  Type:  Sooner Apoointment Request    Caller is requesting a sooner appointment. Caller will not accept being placed on the waitlist and is requesting a message be sent to doctor.  Name of Caller:Yolanda  When is the first available appointment?unknown  Symptoms:Abdominal swelling/Constipation/Pain  Would the patient rather a call back or a response via [facility]? call  Best Call Back Number:[phone]   Additional Information: Patient request to schedule an appointment sooner than next available.  Thank you,  [facility]

## 2023-06-12 ENCOUNTER — TELEPHONE (OUTPATIENT)
Dept: PSYCHIATRY | Facility: CLINIC | Age: 53
End: 2023-06-12
Payer: MEDICAID

## 2023-06-12 ENCOUNTER — OFFICE VISIT (OUTPATIENT)
Dept: PRIMARY CARE CLINIC | Facility: CLINIC | Age: 53
End: 2023-06-12
Payer: MEDICAID

## 2023-06-12 VITALS
BODY MASS INDEX: 39.77 KG/M2 | WEIGHT: 176.81 LBS | TEMPERATURE: 98 F | SYSTOLIC BLOOD PRESSURE: 106 MMHG | DIASTOLIC BLOOD PRESSURE: 74 MMHG | HEART RATE: 84 BPM | OXYGEN SATURATION: 97 % | HEIGHT: 56 IN

## 2023-06-12 DIAGNOSIS — F33.1 MODERATE EPISODE OF RECURRENT MAJOR DEPRESSIVE DISORDER: ICD-10-CM

## 2023-06-12 DIAGNOSIS — E78.2 MIXED HYPERLIPIDEMIA: ICD-10-CM

## 2023-06-12 DIAGNOSIS — K59.09 CHRONIC CONSTIPATION: ICD-10-CM

## 2023-06-12 DIAGNOSIS — L60.0 INGROWN RIGHT GREATER TOENAIL: Primary | ICD-10-CM

## 2023-06-12 DIAGNOSIS — E66.01 SEVERE OBESITY (BMI 35.0-39.9) WITH COMORBIDITY: ICD-10-CM

## 2023-06-12 PROCEDURE — 3008F PR BODY MASS INDEX (BMI) DOCUMENTED: ICD-10-PCS | Mod: CPTII,,, | Performed by: FAMILY MEDICINE

## 2023-06-12 PROCEDURE — 99999 PR PBB SHADOW E&M-EST. PATIENT-LVL III: ICD-10-PCS | Mod: PBBFAC,,, | Performed by: FAMILY MEDICINE

## 2023-06-12 PROCEDURE — 3078F PR MOST RECENT DIASTOLIC BLOOD PRESSURE < 80 MM HG: ICD-10-PCS | Mod: CPTII,,, | Performed by: FAMILY MEDICINE

## 2023-06-12 PROCEDURE — 3044F PR MOST RECENT HEMOGLOBIN A1C LEVEL <7.0%: ICD-10-PCS | Mod: CPTII,,, | Performed by: FAMILY MEDICINE

## 2023-06-12 PROCEDURE — 3072F LOW RISK FOR RETINOPATHY: CPT | Mod: CPTII,,, | Performed by: FAMILY MEDICINE

## 2023-06-12 PROCEDURE — 4010F PR ACE/ARB THEARPY RXD/TAKEN: ICD-10-PCS | Mod: CPTII,,, | Performed by: FAMILY MEDICINE

## 2023-06-12 PROCEDURE — 3078F DIAST BP <80 MM HG: CPT | Mod: CPTII,,, | Performed by: FAMILY MEDICINE

## 2023-06-12 PROCEDURE — 4010F ACE/ARB THERAPY RXD/TAKEN: CPT | Mod: CPTII,,, | Performed by: FAMILY MEDICINE

## 2023-06-12 PROCEDURE — 3072F PR LOW RISK FOR RETINOPATHY: ICD-10-PCS | Mod: CPTII,,, | Performed by: FAMILY MEDICINE

## 2023-06-12 PROCEDURE — 3074F PR MOST RECENT SYSTOLIC BLOOD PRESSURE < 130 MM HG: ICD-10-PCS | Mod: CPTII,,, | Performed by: FAMILY MEDICINE

## 2023-06-12 PROCEDURE — 3008F BODY MASS INDEX DOCD: CPT | Mod: CPTII,,, | Performed by: FAMILY MEDICINE

## 2023-06-12 PROCEDURE — 99214 PR OFFICE/OUTPT VISIT, EST, LEVL IV, 30-39 MIN: ICD-10-PCS | Mod: S$PBB,,, | Performed by: FAMILY MEDICINE

## 2023-06-12 PROCEDURE — 3044F HG A1C LEVEL LT 7.0%: CPT | Mod: CPTII,,, | Performed by: FAMILY MEDICINE

## 2023-06-12 PROCEDURE — 99213 OFFICE O/P EST LOW 20 MIN: CPT | Mod: PBBFAC,PN | Performed by: FAMILY MEDICINE

## 2023-06-12 PROCEDURE — 3074F SYST BP LT 130 MM HG: CPT | Mod: CPTII,,, | Performed by: FAMILY MEDICINE

## 2023-06-12 PROCEDURE — 99999 PR PBB SHADOW E&M-EST. PATIENT-LVL III: CPT | Mod: PBBFAC,,, | Performed by: FAMILY MEDICINE

## 2023-06-12 PROCEDURE — 99214 OFFICE O/P EST MOD 30 MIN: CPT | Mod: S$PBB,,, | Performed by: FAMILY MEDICINE

## 2023-06-12 NOTE — PROGRESS NOTES
Subjective:       Patient ID: Yolanda Betts is a 53 y.o. female.    Chief Complaint: Other Misc (Constipation/right big toe/med refills)      History of Present Illness:   Yolanda Betst 53 y.o. female presents today with    Additional comments: Constipation/right big toe/med refills        C/O right great toe nail pain: x 2 weeks. She tried cutting it herself. Aches and tender. No other sxs.    Also with chronic constipation: she is on Truance and montegrity and miralax. not working. Has tried Linzess in the past and it worked for a while.    Major depression: currently active. Still in IOP program. No change in how she feels inside, cried the entire time, feeling the weight of the world on her shoulder, hopeless and sad and feels like nothig will help, she does have support from her children but does not think anyone understands how she feels, but denies SI/HI.    Obesity: she has lost 5lbs since her last visit.    Diabetes is controlled and A1c is nt due till next month.  HLD:   The 10-year ASCVD risk score (Samuel CUEVAS, et al., 2019) is: 4.6%    Values used to calculate the score:      Age: 53 years      Sex: Female      Is Non- : Yes      Diabetic: Yes      Tobacco smoker: No      Systolic Blood Pressure: 106 mmHg      Is BP treated: Yes      HDL Cholesterol: 38 mg/dL      Total Cholesterol: 131 mg/dL   Past Medical History:   Diagnosis Date    Abnormal Pap smear of cervix     HPV genital warts    Anemia     Anxiety     Arthritis     Asthma 10/11/2016    Bipolar 1 disorder     Diabetes mellitus, type 2     Dyslipidemia associated with type 2 diabetes mellitus 5/13/2019    General anesthetics causing adverse effect in therapeutic use     Genital warts     GERD (gastroesophageal reflux disease)     Herpes simplex virus (HSV) infection     Hyperlipidemia     Hypertension     Hypertension complicating diabetes 5/5/2019    Migraine with aura and without status migrainosus, not  "intractable 3/21/2016    Mild persistent asthma without complication 10/11/2016    Morbid obesity with body mass index (BMI) of 45.0 to 49.9 in adult 8/3/2017    Obstructive sleep apnea     ALEN (obstructive sleep apnea)     2L per N/C q HS    Schizoaffective disorder, bipolar type 4/25/2019    Seasonal allergic rhinitis due to pollen 5/13/2019    Type 2 diabetes mellitus with hyperglycemia, with long-term current use of insulin 12/21/2017    Type 2 diabetes mellitus with hyperglycemia, without long-term current use of insulin 12/21/2017     Family History   Problem Relation Age of Onset    Diabetes Mother     Hyperlipidemia Mother     Hypertension Mother     Asthma Mother     COPD Mother     Glaucoma Mother     Thyroid disease Mother     Anesthesia problems Mother         "almost had a cardiac arrest" , blood clots    Hypertension Father     Hyperlipidemia Father     Cancer Father         Brain, lung, liver, kidney    Heart disease Maternal Grandmother     Hyperlipidemia Maternal Grandmother     Hypertension Maternal Grandmother     Cataracts Maternal Grandmother     Diabetes Maternal Grandmother     Heart disease Maternal Grandfather     Hyperlipidemia Maternal Grandfather     Hypertension Maternal Grandfather     Glaucoma Maternal Grandfather     Cancer Maternal Grandfather     Cataracts Maternal Grandfather     Macular degeneration Maternal Grandfather     Diabetes Maternal Grandfather     Heart disease Paternal Grandmother     Hyperlipidemia Paternal Grandmother     Hypertension Paternal Grandmother     Cataracts Paternal Grandmother     Heart disease Paternal Grandfather     Hyperlipidemia Paternal Grandfather     Hypertension Paternal Grandfather     Cataracts Paternal Grandfather     Breast cancer Maternal Cousin     Breast cancer Maternal Cousin     Breast cancer Maternal Cousin     Breast cancer Maternal Cousin      Social History     Socioeconomic History    Marital status: Single   Tobacco Use    Smoking " status: Never    Smokeless tobacco: Never   Substance and Sexual Activity    Alcohol use: Yes     Alcohol/week: 0.0 standard drinks     Comment: socially  No alcohol 72h prior to sx    Drug use: No    Sexual activity: Yes     Partners: Male     Birth control/protection: Surgical     Comment: john   Social History Narrative    Long-term care nurse     Social Determinants of Health     Financial Resource Strain: High Risk    Difficulty of Paying Living Expenses: Very hard   Food Insecurity: Food Insecurity Present    Worried About Running Out of Food in the Last Year: Sometimes true    Ran Out of Food in the Last Year: Sometimes true   Transportation Needs: No Transportation Needs    Lack of Transportation (Medical): No    Lack of Transportation (Non-Medical): No   Physical Activity: Inactive    Days of Exercise per Week: 0 days    Minutes of Exercise per Session: 0 min   Stress: Stress Concern Present    Feeling of Stress : Very much   Social Connections: Moderately Isolated    Frequency of Communication with Friends and Family: More than three times a week    Frequency of Social Gatherings with Friends and Family: More than three times a week    Attends Confucianism Services: More than 4 times per year    Active Member of Clubs or Organizations: No    Attends Club or Organization Meetings: Never    Marital Status:    Housing Stability: High Risk    Unable to Pay for Housing in the Last Year: Yes    Number of Places Lived in the Last Year: 1    Unstable Housing in the Last Year: No     Outpatient Encounter Medications as of 6/12/2023   Medication Sig Dispense Refill    albuterol (PROVENTIL/VENTOLIN HFA) 90 mcg/actuation inhaler Inhale 2 puffs into the lungs every 6 hours as needed for wheezing 8.5 g 3    albuterol (PROVENTIL/VENTOLIN HFA) 90 mcg/actuation inhaler Inhale 2 puffs into the lungs every 6 hours as needed 8.5 g 0    amLODIPine (NORVASC) 10 MG tablet Take 1 tablet by mouth once daily 30 tablet 0     "amlodipine-valsartan (EXFORGE)  mg per tablet Take 1 tablet by mouth once daily. 90 tablet 3    aspirin (ECOTRIN) 81 MG EC tablet Take 81 mg by mouth once daily.      atorvastatin (LIPITOR) 20 MG tablet Take 1 tablet (20 mg total) by mouth every evening. 90 tablet 3    BD INSULIN SYRINGE ULTRA-FINE 1/2 mL 30 gauge x 1/2" Syrg   0    blood sugar diagnostic (ONETOUCH ULTRA TEST) Strp Check blood glucose 4 times daily as directed and as needed (dispense insurance preferred brand or patient choice) 200 each 5    blood sugar diagnostic Strp 1 each by Misc.(Non-Drug; Combo Route) route 4 (four) times daily. [facility] Glucose Test Strips 400 strip 3    budesonide-formoterol 160-4.5 mcg (SYMBICORT) 160-4.5 mcg/actuation HFAA Inhale 2 puffs into the lungs every 12 (twelve) hours. Controller : Use spacer 10.2 g 6    butalbital-acetaminophen-caffeine -40 mg (FIORICET, ESGIC) -40 mg per tablet Take 1 tablet by mouth every 4 hours as needed for headache 30 tablet 3    cycloSPORINE (RESTASIS) 0.05 % ophthalmic emulsion Place 1 drop into both eyes twice daily 60 each 0    diclofenac sodium (VOLTAREN) 1 % Gel Apply topically to affected areas 4 times daily 100 g 0    DULoxetine (CYMBALTA) 60 MG capsule Take 1 capsule by mouth at bedtime 30 capsule 1    ergocalciferol (ERGOCALCIFEROL) 50,000 unit Cap Take 1 capsule twice weekly for 3 weeks then weekly 12 capsule 3    estradioL (ESTRACE) 0.01 % (0.1 mg/gram) vaginal cream Place 1 g vaginally once daily. 42.5 g 1    eszopiclone (LUNESTA) 2 MG Tab Take 1 tablet by mouth every night at bedtime 30 tablet 0    fenofibrate (TRICOR) 145 MG tablet Take 1 tablet (145 mg total) by mouth once daily. 90 tablet 3    flash glucose scanning reader (FREESTYLE AMY 2 READER) Misc Use as directed to check blood sugar 1 each 1    fluticasone propionate (FLONASE) 50 mcg/actuation nasal spray 2 sprays (100 mcg total) by Each Nostril route once daily. 16 g 0    frovatriptan (FROVA) 2.5 MG " "tablet Take 1 tablet at onset of acute migraine. May take 1 more tablet 2 hours later if needed. (MAX 2 TABLET PER DAY. MAX 4 TABLETS PER WEEK.) 9 tablet 1    furosemide (LASIX) 20 MG tablet Take 1 tablet (20 mg total) by mouth once daily. 90 tablet 3    HEALTHYLAX 17 gram PwPk Take 17 g by mouth 2 (two) times daily.      insulin glargine U-300 conc (TOUJEO MAX U-300 SOLOSTAR) 300 unit/mL (3 mL) insulin pen Inject 84 Units into the skin once daily. 6 mL 2    lamoTRIgine (LAMICTAL) 150 MG Tab Take 1 tablet by mouth at bedtime 30 tablet 0    lancets (ONETOUCH DELICA PLUS LANCET) 30 gauge Misc Use as directed 3 times daily 100 each 11    levocetirizine (XYZAL) 5 MG tablet Take 1 tablet (5 mg total) by mouth every evening. 30 tablet 11    LIDOcaine-prilocaine (EMLA) cream Apply topically up to 4 times per day to affected area for pain relief 60 g 0    lurasidone (LATUDA) 120 mg Tab Take 1 tablet by mouth every night before bedtime 30 tablet 0    magnesium oxide (MAG-OX) 400 mg (241.3 mg magnesium) tablet Take 1 tablet (400 mg total) by mouth once daily. 90 tablet 3    metFORMIN (GLUCOPHAGE) 1000 MG tablet TAKE 1 TABLET(1000 MG) BY MOUTH TWICE DAILY WITH MEALS 180 tablet 1    metoprolol succinate (TOPROL-XL) 50 MG 24 hr tablet Take 1 tablet (50 mg total) by mouth every evening. 90 tablet 3    mirtazapine (REMERON) 7.5 MG Tab Take 7.5 mg by mouth every evening.      omeprazole (PRILOSEC) 40 MG capsule Take 1 capsule (40 mg total) by mouth once daily. 30 capsule 11    pen needle, diabetic (BD ULTRA-FINE MINI PEN NEEDLE) 31 gauge x 3/16" Ndle Use 5 a day 200 each 3    pen needle, diabetic (BD ULTRA-FINE MINI PEN NEEDLE) 31 gauge x 3/16" Ndle Use one needle 5 times a day 200 each 11    promethazine (PHENERGAN) 12.5 MG Tab       prucalopride (MOTEGRITY) 2 mg Tab Take 1 tablet (2 mg) by mouth once daily. 30 tablet 11    semaglutide (OZEMPIC) 2 mg/dose (8 mg/3 mL) PnElis Inject 2 mg into the skin every 7 days. 3 mL 5    " spironolactone (ALDACTONE) 25 MG tablet Take 1 tablet (25 mg total) by mouth once daily. 90 tablet 3    tiZANidine (ZANAFLEX) 4 MG tablet Take 2 tablets by mouth every 6 hours as needed 240 tablet 0    amlodipine-valsartan (EXFORGE)  mg per tablet Take 1 tablet by mouth daily 30 tablet 0    aspirin (ECOTRIN) 81 MG EC tablet Take 1 tablet by mouth daily 30 tablet 0    atorvastatin (LIPITOR) 20 MG tablet Take 1 tablet by mouth daily at bedtime 30 tablet 0    budesonide-formoterol 160-4.5 mcg (SYMBICORT) 160-4.5 mcg/actuation HFAA Inhale 2 puffs into the lungs every 12 hours 10.2 g 0    butalbital-acetaminophen-caffeine -40 mg (FIORICET, ESGIC) -40 mg per tablet Take by mouth.      cycloSPORINE 0.05 % Drop Place 1 drop into both eyes 2 (two) times daily. (Patient not taking: Reported on 4/20/2023) 5.5 mL 11    DULoxetine (CYMBALTA) 60 MG capsule Take 1 capsule by mouth twice daily 60 capsule 1    DULoxetine (CYMBALTA) 60 MG capsule Take 1 capsule by mouth twice daily 60 capsule 0    eszopiclone (LUNESTA) 1 MG Tab Take 1 tablet (1 mg total) by mouth at bedtime as needed (Patient not taking: Reported on 6/12/2023) 30 tablet 0    fenofibrate (TRICOR) 145 MG tablet Take 1 tablet by mouth daily 30 tablet 0    furosemide (LASIX) 20 MG tablet Take 1 tablet by mouth daily 30 tablet 0    lamoTRIgine (LAMICTAL) 200 MG tablet Take 1 tablet by mouth at bedtime 30 tablet 0    levocetirizine (XYZAL) 5 MG tablet Take 1 tablet by mouth daily at bedtime 30 tablet 0    linaCLOtide (LINZESS) 290 mcg Cap capsule Take 1 capsule (290 mcg total) by mouth before breakfast. 30 capsule 5    lurasidone (LATUDA) 40 mg Tab tablet Take 1 tablet by mouth at night. Take with 60 mg tablet for a total dose of 100 mg 30 tablet 0    lurasidone (LATUDA) 60 mg Tab tablet Take 1 tablet by mouth daily with 350 calories 30 tablet 2    lurasidone (LATUDA) 60 mg Tab tablet Take 1 tablet by mouth at bedtime. Take with 40 mg tablet for a total  dose of 100 mg 30 tablet 0    magnesium oxide (MAG-OX) 400 mg (241.3 mg magnesium) tablet Take 1 tablet by mouth daily 30 tablet 0    metFORMIN (GLUCOPHAGE) 1000 MG tablet Take 1 tablet by mouth twice daily 60 tablet 2    metoprolol succinate (TOPROL-XL) 50 MG 24 hr tablet Take 1 tablet by mouth at bedtime 30 tablet 0    mirtazapine (REMERON) 7.5 MG Tab Take 1 tablet by mouth at bedtime 30 tablet 0    naltrexone capsule Take 1 capsule (4.5 mg total) by mouth every evening. 90 capsule 2    naproxen (NAPROSYN) 500 MG tablet Take 1 tablet (500 mg total) by mouth 2 (two) times daily. 60 tablet 1    omeprazole (PRILOSEC) 40 MG capsule Take 1 capsule by mouth daily 30 capsule 0    pneumoc 20-tatiana conj-dip cr,PF, (PREVNAR-20, PF,) 0.5 mL Syrg injection Inject 0.5 mLs into the muscle. 0.5 mL 0    spironolactone (ALDACTONE) 25 MG tablet Take 1 tablet by mouth once daily 30 tablet 0    [DISCONTINUED] ALPRAZolam (XANAX XR) 2 MG Tb24 Take 1 tablet by mouth at bedtime 30 tablet 1    [DISCONTINUED] ALPRAZolam (XANAX) 0.5 MG tablet Take 1 tablet by mouth four times a day 120 tablet 0    [DISCONTINUED] ALPRAZolam (XANAX) 1 MG tablet Take 1 tablet by mouth three times a day for anxiety 90 tablet 0    [DISCONTINUED] clonazePAM (KLONOPIN) 1 MG tablet Take 1 tablet by mouth daily 30 tablet 0    [DISCONTINUED] eszopiclone (LUNESTA) 1 MG Tab Take 1 tablet (1 mg total) by mouth at bedtime as needed 30 tablet 0    [DISCONTINUED] glucagon, human recombinant, (GLUCAGON EMERGENCY KIT, HUMAN,) 1 mg SolR Inject 1 mg into the muscle as needed. Emergency use 1 each 1    [DISCONTINUED] insulin glargine U-300 conc (TOUJEO MAX U-300 SOLOSTAR) 300 unit/mL (3 mL) insulin pen Inject 70 Units into the skin once daily. 4 pen 5    [DISCONTINUED] insulin glargine U-300 conc (TOUJEO MAX U-300 SOLOSTAR) 300 unit/mL (3 mL) insulin pen Inject 70 Units into the skin once daily. 6 mL 2    [DISCONTINUED] metFORMIN (GLUCOPHAGE) 1000 MG tablet Take 1 tablet by mouth  "twice daily 60 tablet 0    [DISCONTINUED] TRULANCE 3 mg Tab TAKE 1 TABLET BY MOUTH EVERY DAY 90 tablet 0    [DISCONTINUED] valsartan (DIOVAN) 320 MG tablet Take 1 tablet (320 mg total) by mouth once daily. 90 tablet 3    [DISCONTINUED] zolpidem (AMBIEN) 5 MG Tab Take 1/2 tablet (2.5 mg) by mouth at bedtime 15 tablet 0     No facility-administered encounter medications on file as of 6/12/2023.       Review of Systems    Objective:      /74 (BP Location: Right arm, Patient Position: Sitting, BP Method: Medium (Manual))   Pulse 84   Temp 98.3 °F (36.8 °C) (Oral)   Ht 4' 8" (1.422 m)   Wt 80.2 kg (176 lb 12.8 oz)   SpO2 97%   BMI 39.64 kg/m²   Physical Exam  Cardiovascular:      Rate and Rhythm: Bradycardia present.      Pulses: Normal pulses.      Heart sounds: Normal heart sounds.   Pulmonary:      Effort: Pulmonary effort is normal.      Breath sounds: Normal breath sounds.   Musculoskeletal:        Feet:    Feet:      Right foot:      Toenail Condition: Right toenails are ingrown.       Results for orders placed or performed in visit on 04/11/23   Comprehensive Metabolic Panel   Result Value Ref Range    Sodium 139 136 - 145 mmol/L    Potassium 4.2 3.5 - 5.1 mmol/L    Chloride 101 95 - 110 mmol/L    CO2 28 23 - 29 mmol/L    Glucose 98 70 - 110 mg/dL    BUN 13 6 - 20 mg/dL    Creatinine 1.1 0.5 - 1.4 mg/dL    Calcium 10.3 8.7 - 10.5 mg/dL    Total Protein 7.7 6.0 - 8.4 g/dL    Albumin 4.1 3.5 - 5.2 g/dL    Total Bilirubin 0.2 0.1 - 1.0 mg/dL    Alkaline Phosphatase 64 55 - 135 U/L    AST 11 10 - 40 U/L    ALT 14 10 - 44 U/L    Anion Gap 10 8 - 16 mmol/L    eGFR >60 >60 mL/min/1.73 m^2     *Note: Due to a large number of results and/or encounters for the requested time period, some results have not been displayed. A complete set of results can be found in Results Review.     Assessment:       1. Ingrown right greater toenail    2. Chronic constipation    3. Severe obesity (BMI 35.0-39.9) with comorbidity  "   4. BMI 39.0-39.9,adult    5. Mixed hyperlipidemia    6. Moderate episode of recurrent major depressive disorder        Plan:   Ingrown right greater toenail  -     Ambulatory referral/consult to Podiatry; Future; Expected date: 06/19/2023    Chronic constipation  -     linaCLOtide (LINZESS) 290 mcg Cap capsule; Take 1 capsule (290 mcg total) by mouth before breakfast.  Dispense: 30 capsule; Refill: 5    Severe obesity (BMI 35.0-39.9) with comorbidity    BMI 39.0-39.9,adult  Comments:  counseled to continue to eat less calories and exercise as to loose weight and help with depression.    Mixed hyperlipidemia  -     Lipids Digital Medicine (LDMP) Enrollment Order  -     Lipids Digital Medicine (LDMP) Enrollment Order    Moderate episode of recurrent major depressive disorder  Comments:  spend time encouraging the pt, she does not think she needs to be inpatient and will continu out pt tx, will see her more frequently if discharged from therapy.      I have reviewed all of the patient's clinical history available in care everywhere and Epic and have utilized this in my evaluation and management recommendations today.       Treatment options and alternatives were discussed with the patient. Patient was given ample time to ask questions. All questions were answered. Voices understanding and acceptance of this advice. Will call back if any further questions or concerns.  Sasha Sorenson MD

## 2023-06-12 NOTE — TELEPHONE ENCOUNTER
----- Message from Mariam Young, PhD, MPAP sent at 6/12/2023  3:09 PM CDT -----  Regarding: RE: schedule  There are some held for tomorrow--let's check for one of those times.    ----- Message -----  From: Esthela Soto MA  Sent: 6/12/2023   1:58 PM CDT  To: Mariam Young, PhD, MPAP  Subject: RE: schedule                                     The only in office appointment you have open is 6/28 2pm do you want me to offer her that??  ----- Message -----  From: Mariam Young, PhD, MPAP  Sent: 6/12/2023  12:15 PM CDT  To: Esthela Soto MA  Subject: schedule                                         Please call pt to schedule an in office visit for this week.    She said that she was checked in for her virtual visit today and had called and left a message at 11:45 when I was not on visit, but the system never showed her as checked in our end.    thanks

## 2023-06-12 NOTE — TELEPHONE ENCOUNTER
Called Yolanda to check on her--she said that she had been waiting for her visit since 11:27 am--but the system never showed her as checked in on our end; she also said that she called and left a message    I will ask staff to call her to schedule an in office visit for this week.    I also provided her with the number for the patient portal IT and requested that she screen shot and send via message where she had been checked in.

## 2023-06-12 NOTE — TELEPHONE ENCOUNTER
----- Message from Cristina Cabrera sent at 6/12/2023 11:42 AM CDT -----  Contact: [phone]    Patient:    Reason: She is requesting a call back from the nurse to get assistance with the virtual appointment,     patient says she has been logged and had no response from the provider Mariam Young.     Comments: Please call the patient back to advise.

## 2023-06-13 ENCOUNTER — OFFICE VISIT (OUTPATIENT)
Dept: PSYCHIATRY | Facility: CLINIC | Age: 53
End: 2023-06-13
Payer: MEDICAID

## 2023-06-13 VITALS
HEART RATE: 86 BPM | BODY MASS INDEX: 39.54 KG/M2 | DIASTOLIC BLOOD PRESSURE: 87 MMHG | SYSTOLIC BLOOD PRESSURE: 123 MMHG | WEIGHT: 176.38 LBS

## 2023-06-13 DIAGNOSIS — F41.1 GENERALIZED ANXIETY DISORDER: ICD-10-CM

## 2023-06-13 DIAGNOSIS — F25.0 SCHIZOAFFECTIVE DISORDER, BIPOLAR TYPE: Primary | Chronic | ICD-10-CM

## 2023-06-13 DIAGNOSIS — R25.9 ABNORMAL MOVEMENT: ICD-10-CM

## 2023-06-13 DIAGNOSIS — G47.00 INSOMNIA, UNSPECIFIED TYPE: ICD-10-CM

## 2023-06-13 PROCEDURE — 1159F MED LIST DOCD IN RCRD: CPT | Mod: CPTII,,, | Performed by: PSYCHOLOGIST

## 2023-06-13 PROCEDURE — 4010F ACE/ARB THERAPY RXD/TAKEN: CPT | Mod: CPTII,,, | Performed by: PSYCHOLOGIST

## 2023-06-13 PROCEDURE — 3072F LOW RISK FOR RETINOPATHY: CPT | Mod: CPTII,,, | Performed by: PSYCHOLOGIST

## 2023-06-13 PROCEDURE — 4010F PR ACE/ARB THEARPY RXD/TAKEN: ICD-10-PCS | Mod: CPTII,,, | Performed by: PSYCHOLOGIST

## 2023-06-13 PROCEDURE — 3072F PR LOW RISK FOR RETINOPATHY: ICD-10-PCS | Mod: CPTII,,, | Performed by: PSYCHOLOGIST

## 2023-06-13 PROCEDURE — 90833 PSYTX W PT W E/M 30 MIN: CPT | Mod: HP,HB,S$PBB, | Performed by: PSYCHOLOGIST

## 2023-06-13 PROCEDURE — 99215 PR OFFICE/OUTPT VISIT, EST, LEVL V, 40-54 MIN: ICD-10-PCS | Mod: HP,HB,S$PBB, | Performed by: PSYCHOLOGIST

## 2023-06-13 PROCEDURE — 99999 PR PBB SHADOW E&M-EST. PATIENT-LVL III: CPT | Mod: PBBFAC,HB,, | Performed by: PSYCHOLOGIST

## 2023-06-13 PROCEDURE — 99215 OFFICE O/P EST HI 40 MIN: CPT | Mod: HP,HB,S$PBB, | Performed by: PSYCHOLOGIST

## 2023-06-13 PROCEDURE — 1159F PR MEDICATION LIST DOCUMENTED IN MEDICAL RECORD: ICD-10-PCS | Mod: CPTII,,, | Performed by: PSYCHOLOGIST

## 2023-06-13 PROCEDURE — 99213 OFFICE O/P EST LOW 20 MIN: CPT | Mod: PBBFAC | Performed by: PSYCHOLOGIST

## 2023-06-13 PROCEDURE — 3008F BODY MASS INDEX DOCD: CPT | Mod: CPTII,,, | Performed by: PSYCHOLOGIST

## 2023-06-13 PROCEDURE — 3079F PR MOST RECENT DIASTOLIC BLOOD PRESSURE 80-89 MM HG: ICD-10-PCS | Mod: CPTII,,, | Performed by: PSYCHOLOGIST

## 2023-06-13 PROCEDURE — 3008F PR BODY MASS INDEX (BMI) DOCUMENTED: ICD-10-PCS | Mod: CPTII,,, | Performed by: PSYCHOLOGIST

## 2023-06-13 PROCEDURE — 99999 PR PBB SHADOW E&M-EST. PATIENT-LVL III: ICD-10-PCS | Mod: PBBFAC,HB,, | Performed by: PSYCHOLOGIST

## 2023-06-13 PROCEDURE — 3044F HG A1C LEVEL LT 7.0%: CPT | Mod: CPTII,,, | Performed by: PSYCHOLOGIST

## 2023-06-13 PROCEDURE — 3079F DIAST BP 80-89 MM HG: CPT | Mod: CPTII,,, | Performed by: PSYCHOLOGIST

## 2023-06-13 PROCEDURE — 90833 PR PSYCHOTHERAPY W/PATIENT W/E&M, 30 MIN (ADD ON): ICD-10-PCS | Mod: HP,HB,S$PBB, | Performed by: PSYCHOLOGIST

## 2023-06-13 PROCEDURE — 3074F PR MOST RECENT SYSTOLIC BLOOD PRESSURE < 130 MM HG: ICD-10-PCS | Mod: CPTII,,, | Performed by: PSYCHOLOGIST

## 2023-06-13 PROCEDURE — 3074F SYST BP LT 130 MM HG: CPT | Mod: CPTII,,, | Performed by: PSYCHOLOGIST

## 2023-06-13 PROCEDURE — 3044F PR MOST RECENT HEMOGLOBIN A1C LEVEL <7.0%: ICD-10-PCS | Mod: CPTII,,, | Performed by: PSYCHOLOGIST

## 2023-06-13 RX ORDER — LAMOTRIGINE 150 MG/1
150 TABLET ORAL EVERY MORNING
Qty: 30 TABLET | Refills: 2 | Status: SHIPPED | OUTPATIENT
Start: 2023-06-13 | End: 2023-09-13 | Stop reason: SDUPTHER

## 2023-06-13 RX ORDER — ALPRAZOLAM 2 MG/1
2 TABLET, EXTENDED RELEASE ORAL DAILY
Qty: 30 TABLET | Refills: 2 | Status: SHIPPED | OUTPATIENT
Start: 2023-06-13 | End: 2023-08-10 | Stop reason: ALTCHOICE

## 2023-06-13 RX ORDER — LURASIDONE HYDROCHLORIDE 120 MG/1
TABLET, FILM COATED ORAL
Qty: 30 TABLET | Refills: 2 | Status: SHIPPED | OUTPATIENT
Start: 2023-06-13 | End: 2023-08-10

## 2023-06-13 RX ORDER — ESZOPICLONE 2 MG/1
TABLET, FILM COATED ORAL
Qty: 30 TABLET | Refills: 2 | Status: SHIPPED | OUTPATIENT
Start: 2023-06-13 | End: 2023-07-26

## 2023-06-13 RX ORDER — DULOXETIN HYDROCHLORIDE 60 MG/1
CAPSULE, DELAYED RELEASE ORAL
Qty: 60 CAPSULE | Refills: 2 | Status: SHIPPED | OUTPATIENT
Start: 2023-06-13 | End: 2023-10-09 | Stop reason: SDUPTHER

## 2023-06-13 NOTE — PROGRESS NOTES
Outpatient Psychiatry Follow-Up Visit    6/13/2023        Chief Complaint:  Yolanda Betts is a 53 y.o. female who presents today for follow-up of mood and anxiety.       Impressions/Plan from last visit: Yolanda attended her virtual visit. She is still in IOP at [facility]. She was admitted last weekend--medicines were changed. She goes Monday, Wed, and Fri from 10:30 to 1:30 pm. She thinks that the medicines are helping some. She believes that she will be in that program at least until April. We discussed that she would continue with them so she does not have multiple people changing her medicines. She will keep us updated so we can get her scheduled.    Plan--continue Cymbalta 60 mg hs, 30 mg am; Latuda 60 mg; Lamictal 200 mg; Remeron 7.5 mg hs; Xanax XR 2 mg (all per pt report)    Interval History and Content of Current Session: Yolanda attended her visit. Her daughter brought her to the visit. Her cousin is living with her and helps her with some of the expenses. She was last seen by me in February of 2023--had been hospitalized and attended IOP since we last met. She finished the IOP on last Friday--she brought in her medicines--Latuda 120 mg; Lamictal 150 mg hs, Cymbalta 60 mg bid, Lunesta 2 mg hs, Xanax XR 2 mg hs. She does think that the medicines are working--but still has depression. She has not been able to work--on verge of foreclosure. Her vehicle has already been repossessed. She had FMLA with her job--then went to part-time and tried to work but only worked 2 weeks and then returned to hospital; then her job denied her FMLA. She last worked sometime in April. She has applied for disability back in January but has not heard anything from them. She is not sure if she applied for Title 2 or 16. As we talked about this, she became more anxious--started visibly shaking. She does have an upcoming therapy appt with [facility] ([phone]) and hopes to be seen weekly for support. She is still having anger  "issues--"coming from family"; very easily agitated. Reports having panic attacks--"feels like something sitting on my chest." She has them daily--sometimes lasts for 90 minutes. She calls her daughter; does breathing exercises/meditation--helps some. We talked about the mood card for biofeedback. When asked about involuntary movements, she said that she has jerking movements sometimes and feels it in her sleep--happens daily. She described kicking out with her knee or extending her elbow. She reported continuing to hear voices--regardless of mood; also reported seeing movement out of her periphery, sees shadows; sometimes sees an animal or a person when no one else can see it. She reported crying spells, lasting 10-20 minutes--we discussed her journaling/tracking those with her thoughts and events, along with the panic.    Plan--continue Latuda 120 mg, Lamictal 150 mg (switch to mornings); Cymbalta 60 mg bid; Lunesta 2 mg hs; Xanax XR 2 mg hs; neurology referral; Journal crying spells and panic      [Binh Caro, NP - 04/05/2023 12:00 PM CDT  Formatting of this note is different from the original.  Progress note    History of Present Illness:  The patient is a 52 y.o. female admitted to [facility] on 3/31/2023 from [facility]. Patient was admitted under a formal voluntary admission after she expressed suicidal ideations with a plan to take pills and not wake up. Diagnoses include schizoaffective disorder (bipolar type) depression, anxiety, PTSD, type II IDDM, hypertension, hyperlipidemia, GERD, and fibromyalgia.    Chief complaint:    Patient seen today for discharge planning. Patient deemed safe for discharge by primary psychiatry team. Discussed discharge with  and nursing staff. No new complaints from patient today.    Suicidal ideations with a plan  -Admitted under voluntary.  -Medical history reviewed. Labs unremarkable. No obvious acute metabolic etiology suspected. " Appears appropriate for inpatient psych admission. Following psych team recommendation regarding psych medication management.   -Discharge per psychiatry]     since February 2023.          PSYCHOTHERAPY      Site: [facility]  Time: 20 minutes  Participants: Met with patient    Therapeutic Intervention Type: behavior modifying psychotherapy, supportive psychotherapy  Why chosen therapy is appropriate versus another modality: patient responds to this modality, evidence based practice    Target symptoms: depression, anxiety , mood disorder, psychosis, financial stress  Primary focus: see above    Outcome monitoring methods: self-report, observation    Patient's response to intervention:  The patient's response to intervention is accepting.    Progress toward goals:  The patient's progress toward goals is  just out of IOP .    ----------------------------------------------------------------------------------------------------------  Prior medicines: Prozac, Wellbutrin, Paxil, Lamictal, Lexapro, Celexa, Cymbalta, Remeron, Abilify, Seroquel (raised blood sugars), Risperdal, Restoril, Ambien (groggy), trazodone, Xanax, clonazepam    GAD7 6/12/2023 2/24/2023 1/4/2023   1. Feeling nervous, anxious, or on edge? 3 1 3   2. Not being able to stop or control worrying? 3 1 3   3. Worrying too much about different things? 3 1 3   4. Trouble relaxing? 3 1 3   5. Being so restless that it is hard to sit still? 3 2 3   6. Becoming easily annoyed or irritable? 3 1 3   7. Feeling afraid as if something awful might happen? 0 1 3   JOANN-7 Score 18 8 21      0-4 = Minimal anxiety  5-9 = Mild anxiety  10-14 = Moderate anxiety  15-21 = Severe anxiety       Review of Systems   PSYCHIATRIC: Pertinant items are noted in the narrative.    Past Medical, Family and Social History: The patient's past medical, family and social history have been reviewed and updated as appropriate within the electronic medical record - see encounter  "notes.      Current Outpatient Medications:     ALPRAZolam (XANAX XR) 2 MG Tb24, Take 1 tablet (2 mg total) by mouth once daily., Disp: 30 tablet, Rfl: 2    amLODIPine (NORVASC) 10 MG tablet, Take 1 tablet by mouth once daily, Disp: 30 tablet, Rfl: 0    amlodipine-valsartan (EXFORGE)  mg per tablet, Take 1 tablet by mouth once daily., Disp: 90 tablet, Rfl: 3    aspirin (ECOTRIN) 81 MG EC tablet, Take 1 tablet by mouth daily, Disp: 30 tablet, Rfl: 0    atorvastatin (LIPITOR) 20 MG tablet, Take 1 tablet (20 mg total) by mouth every evening., Disp: 90 tablet, Rfl: 3    BD INSULIN SYRINGE ULTRA-FINE 1/2 mL 30 gauge x 1/2" Syrg, , Disp: , Rfl: 0    blood sugar diagnostic (ONETOUCH ULTRA TEST) Strp, Check blood glucose 4 times daily as directed and as needed (dispense insurance preferred brand or patient choice), Disp: 200 each, Rfl: 5    blood sugar diagnostic Strp, 1 each by Misc.(Non-Drug; Combo Route) route 4 (four) times daily. [facility] Glucose Test Strips, Disp: 400 strip, Rfl: 3    budesonide-formoterol 160-4.5 mcg (SYMBICORT) 160-4.5 mcg/actuation HFAA, Inhale 2 puffs into the lungs every 12 (twelve) hours. Controller : Use spacer, Disp: 10.2 g, Rfl: 6    butalbital-acetaminophen-caffeine -40 mg (FIORICET, ESGIC) -40 mg per tablet, Take 1 tablet by mouth every 4 hours as needed for headache, Disp: 30 tablet, Rfl: 3    cycloSPORINE (RESTASIS) 0.05 % ophthalmic emulsion, Place 1 drop into both eyes twice daily, Disp: 60 each, Rfl: 0    cycloSPORINE 0.05 % Drop, Place 1 drop into both eyes 2 (two) times daily. (Patient not taking: Reported on 4/20/2023), Disp: 5.5 mL, Rfl: 11    diclofenac sodium (VOLTAREN) 1 % Gel, Apply topically to affected areas 4 times daily, Disp: 100 g, Rfl: 0    DULoxetine (CYMBALTA) 60 MG capsule, Take 1 capsule by mouth twice daily, Disp: 60 capsule, Rfl: 2    ergocalciferol (ERGOCALCIFEROL) 50,000 unit Cap, Take 1 capsule twice weekly for 3 weeks then weekly, Disp: 12 " capsule, Rfl: 3    estradioL (ESTRACE) 0.01 % (0.1 mg/gram) vaginal cream, Place 1 g vaginally once daily., Disp: 42.5 g, Rfl: 1    eszopiclone (LUNESTA) 2 MG Tab, Take 1 tablet by mouth every night at bedtime, Disp: 30 tablet, Rfl: 2    fenofibrate (TRICOR) 145 MG tablet, Take 1 tablet (145 mg total) by mouth once daily., Disp: 90 tablet, Rfl: 3    flash glucose scanning reader ([place] AMY 2 READER) [facility] [place] Use as directed to check blood sugar, Disp: 1 each, Rfl: 1    fluticasone propionate (FLONASE) 50 mcg/actuation nasal spray, 2 sprays (100 mcg total) by Each Nostril route once daily., Disp: 16 g, Rfl: 0    frovatriptan (FROVA) 2.5 MG tablet, Take 1 tablet at onset of acute migraine. May take 1 more tablet 2 hours later if needed. (MAX 2 TABLET PER DAY. MAX 4 TABLETS PER WEEK.), Disp: 9 tablet, Rfl: 1    furosemide (LASIX) 20 MG tablet, Take 1 tablet (20 mg total) by mouth once daily., Disp: 90 tablet, Rfl: 3    HEALTHYLAX 17 gram PwPk, Take 17 g by mouth 2 (two) times daily., Disp: , Rfl:     insulin glargine U-300 conc (TOUJEO MAX U-300 SOLOSTAR) 300 unit/mL (3 mL) insulin pen, Inject 84 Units into the skin once daily., Disp: 6 mL, Rfl: 2    lamoTRIgine (LAMICTAL) 150 MG Tab, Take 1 tablet (150 mg total) by mouth every morning., Disp: 30 tablet, Rfl: 2    lancets (ONETOUCH DELICA PLUS LANCET) 30 gauge Misc, Use as directed 3 times daily, Disp: 100 each, Rfl: 11    levocetirizine (XYZAL) 5 MG tablet, Take 1 tablet (5 mg total) by mouth every evening., Disp: 30 tablet, Rfl: 11    LIDOcaine-prilocaine (EMLA) cream, Apply topically up to 4 times per day to affected area for pain relief, Disp: 60 g, Rfl: 0    linaCLOtide (LINZESS) 290 mcg Cap capsule, Take 1 capsule (290 mcg total) by mouth before breakfast., Disp: 30 capsule, Rfl: 5    lurasidone (LATUDA) 120 mg Tab, Take 1 tablet by mouth every night before bedtime, Disp: 30 tablet, Rfl: 2    magnesium oxide (MAG-OX) 400 mg (241.3 mg magnesium) tablet, Take 1  "tablet (400 mg total) by mouth once daily., Disp: 90 tablet, Rfl: 3    metFORMIN (GLUCOPHAGE) 1000 MG tablet, TAKE 1 TABLET(1000 MG) BY MOUTH TWICE DAILY WITH MEALS, Disp: 180 tablet, Rfl: 1    metoprolol succinate (TOPROL-XL) 50 MG 24 hr tablet, Take 1 tablet (50 mg total) by mouth every evening., Disp: 90 tablet, Rfl: 3    naproxen (NAPROSYN) 500 MG tablet, Take 1 tablet (500 mg total) by mouth 2 (two) times daily., Disp: 60 tablet, Rfl: 1    omeprazole (PRILOSEC) 40 MG capsule, Take 1 capsule (40 mg total) by mouth once daily., Disp: 30 capsule, Rfl: 11    pen needle, diabetic (BD ULTRA-FINE MINI PEN NEEDLE) 31 gauge x 3/16" Ndle, Use 5 a day, Disp: 200 each, Rfl: 3    pen needle, diabetic (BD ULTRA-FINE MINI PEN NEEDLE) 31 gauge x 3/16" Ndle, Use one needle 5 times a day, Disp: 200 each, Rfl: 11    pneumoc 20-tatiana conj-dip cr,PF, (PREVNAR-20, PF,) 0.5 mL Syrg injection, Inject 0.5 mLs into the muscle., Disp: 0.5 mL, Rfl: 0    promethazine (PHENERGAN) 12.5 MG Tab, , Disp: , Rfl:     prucalopride (MOTEGRITY) 2 mg Tab, Take 1 tablet (2 mg) by mouth once daily., Disp: 30 tablet, Rfl: 11    semaglutide (OZEMPIC) 2 mg/dose (8 mg/3 mL) PnIj, Inject 2 mg into the skin every 7 days., Disp: 3 mL, Rfl: 5    spironolactone (ALDACTONE) 25 MG tablet, Take 1 tablet (25 mg total) by mouth once daily., Disp: 90 tablet, Rfl: 3    tiZANidine (ZANAFLEX) 4 MG tablet, Take 2 tablets by mouth every 6 hours as needed, Disp: 240 tablet, Rfl: 0    Compliance: yes    Side effects: see above    Risk Parameters:  Patient reports no suicidal ideation  Patient reports no homicidal ideation  Patient reports no self-injurious behavior  Patient reports no violent behavior    Exam (detailed: at least 9 elements; comprehensive: all 15 elements)   Constitutional  Vitals:  Most recent vital signs were reviewed.   Last 3 sets of Vitals    Vitals - 1 value per visit 6/12/2023 6/12/2023 6/13/2023   SYSTOLIC - 106 123   DIASTOLIC - 74 87   Pulse - 84 86 "   Temp - 98.3 -   Resp - - -   SPO2 - 97 -   Weight (lb) - 176.8 176.37   Weight (kg) - 80.196 80   Height - 56 -   BMI (Calculated) - 39.7 39.6   VISIT REPORT - - -   Pain Score  7 - -   Some recent data might be hidden          General:  age appropriate, casually dressed, neatly groomed, wearing glasses     Musculoskeletal  Muscle Strength/Tone:  Shaking throughout   Gait & Station:  non-ataxic     Psychiatric  Speech:  no latency; no press, soft   Behavior: Almost constant shaking throughout visit   Mood & Affect:  anxious, depressed  congruent and appropriate   Thought Process:  normal and logical   Associations:  intact   Thought Content:  Denied current suicidal ideation; reporting hearing voices and having visual hallucinations and illusions   Insight:  has awareness of illness   Judgement: behavior is adequate to circumstances   Orientation:  grossly intact   Memory: intact for content of interview   Language: grossly intact   Attention Span & Concentration:  Grossly intact   Fund of Knowledge:  intact and appropriate to age and level of education     Assessment and Diagnosis   Status/Progress: Based on the examination today, the patient's problem(s) is/are treatment resistant, worsening, and failing to respond as expected to treatment.  New problems have been presented today.   Co-morbidities and psychosocial stressors  are complicating management of the primary condition.  The working differential for this patient includes schizoaffective vs bipolar.     General Impression:     Encounter Diagnoses   Name Primary?    Schizoaffective disorder, bipolar type Yes    Generalized anxiety disorder     Insomnia, unspecified type     Abnormal movement        Intervention/Counseling/Treatment Plan   Medication Management: Discussed risks, benefits, and alternatives to treatment plan documented above with patient. I answered all patient questions related to this plan, and patient expressed understanding and agreement.  "  continue Latuda 120 mg, Lamictal 150 mg (switch to mornings); Cymbalta 60 mg bid; Lunesta 2 mg hs; Xanax XR 2 mg hs  Continue with upcoming therapy appt    Neurology referral  Release for records from [facility]  Journal crying spells and panic      Medication List with Changes/Refills   Current Medications    AMLODIPINE (NORVASC) 10 MG TABLET    Take 1 tablet by mouth once daily    AMLODIPINE-VALSARTAN (EXFORGE)  MG PER TABLET    Take 1 tablet by mouth once daily.    ASPIRIN (ECOTRIN) 81 MG EC TABLET    Take 1 tablet by mouth daily    ATORVASTATIN (LIPITOR) 20 MG TABLET    Take 1 tablet (20 mg total) by mouth every evening.    BD INSULIN SYRINGE ULTRA-FINE 1/2 ML 30 GAUGE X 1/2" SYRG        BLOOD SUGAR DIAGNOSTIC (ONETOUCH ULTRA TEST) STRP    Check blood glucose 4 times daily as directed and as needed (dispense insurance preferred brand or patient choice)    BLOOD SUGAR DIAGNOSTIC STRP    1 each by Misc.(Non-Drug; Combo Route) route 4 (four) times daily. [facility] Glucose Test Strips    BUDESONIDE-FORMOTEROL 160-4.5 MCG (SYMBICORT) 160-4.5 MCG/ACTUATION HFAA    Inhale 2 puffs into the lungs every 12 (twelve) hours. Controller : Use spacer    BUTALBITAL-ACETAMINOPHEN-CAFFEINE -40 MG (FIORICET, ESGIC) -40 MG PER TABLET    Take 1 tablet by mouth every 4 hours as needed for headache    CYCLOSPORINE (RESTASIS) 0.05 % OPHTHALMIC EMULSION    Place 1 drop into both eyes twice daily    CYCLOSPORINE 0.05 % DROP    Place 1 drop into both eyes 2 (two) times daily.    DICLOFENAC SODIUM (VOLTAREN) 1 % GEL    Apply topically to affected areas 4 times daily    ERGOCALCIFEROL (ERGOCALCIFEROL) 50,000 UNIT CAP    Take 1 capsule twice weekly for 3 weeks then weekly    ESTRADIOL (ESTRACE) 0.01 % (0.1 MG/GRAM) VAGINAL CREAM    Place 1 g vaginally once daily.    FENOFIBRATE (TRICOR) 145 MG TABLET    Take 1 tablet (145 mg total) by mouth once daily.    FLASH GLUCOSE SCANNING READER ([place] AMY 2 READER) [facility] [place]    Use as " "directed to check blood sugar    FLUTICASONE PROPIONATE (FLONASE) 50 MCG/ACTUATION NASAL SPRAY    2 sprays (100 mcg total) by Each Nostril route once daily.    FROVATRIPTAN (FROVA) 2.5 MG TABLET    Take 1 tablet at onset of acute migraine. May take 1 more tablet 2 hours later if needed. (MAX 2 TABLET PER DAY. MAX 4 TABLETS PER WEEK.)    FUROSEMIDE (LASIX) 20 MG TABLET    Take 1 tablet (20 mg total) by mouth once daily.    HEALTHYLAX 17 GRAM PWPK    Take 17 g by mouth 2 (two) times daily.    INSULIN GLARGINE U-300 CONC (TOUJEO MAX U-300 SOLOSTAR) 300 UNIT/ML (3 ML) INSULIN PEN    Inject 84 Units into the skin once daily.    LANCETS (ONETOUCH DELICA PLUS LANCET) 30 GAUGE MISC    Use as directed 3 times daily    LEVOCETIRIZINE (XYZAL) 5 MG TABLET    Take 1 tablet (5 mg total) by mouth every evening.    LIDOCAINE-PRILOCAINE (EMLA) CREAM    Apply topically up to 4 times per day to affected area for pain relief    LINACLOTIDE (LINZESS) 290 MCG CAP CAPSULE    Take 1 capsule (290 mcg total) by mouth before breakfast.    MAGNESIUM OXIDE (MAG-OX) 400 MG (241.3 MG MAGNESIUM) TABLET    Take 1 tablet (400 mg total) by mouth once daily.    METFORMIN (GLUCOPHAGE) 1000 MG TABLET    TAKE 1 TABLET(1000 MG) BY MOUTH TWICE DAILY WITH MEALS    METOPROLOL SUCCINATE (TOPROL-XL) 50 MG 24 HR TABLET    Take 1 tablet (50 mg total) by mouth every evening.    NAPROXEN (NAPROSYN) 500 MG TABLET    Take 1 tablet (500 mg total) by mouth 2 (two) times daily.    OMEPRAZOLE (PRILOSEC) 40 MG CAPSULE    Take 1 capsule (40 mg total) by mouth once daily.    PEN NEEDLE, DIABETIC (BD ULTRA-FINE MINI PEN NEEDLE) 31 GAUGE X 3/16" NDLE    Use 5 a day    PEN NEEDLE, DIABETIC (BD ULTRA-FINE MINI PEN NEEDLE) 31 GAUGE X 3/16" NDLE    Use one needle 5 times a day    PNEUMOC 20-RAY CONJ-DIP CR,PF, (PREVNAR-20, PF,) 0.5 ML SYRG INJECTION    Inject 0.5 mLs into the muscle.    PROMETHAZINE (PHENERGAN) 12.5 MG TAB        PRUCALOPRIDE (MOTEGRITY) 2 MG TAB    Take 1 " tablet (2 mg) by mouth once daily.    SEMAGLUTIDE (OZEMPIC) 2 MG/DOSE (8 MG/3 ML) PNIJ    Inject 2 mg into the skin every 7 days.    SPIRONOLACTONE (ALDACTONE) 25 MG TABLET    Take 1 tablet (25 mg total) by mouth once daily.    TIZANIDINE (ZANAFLEX) 4 MG TABLET    Take 2 tablets by mouth every 6 hours as needed   Changed and/or Refilled Medications    Modified Medication Previous Medication    ALPRAZOLAM (XANAX XR) 2 MG TB24 ALPRAZolam (XANAX XR) 2 MG Tb24       Take 1 tablet (2 mg total) by mouth once daily.    Take 1 tablet by mouth at bedtime    DULOXETINE (CYMBALTA) 60 MG CAPSULE DULoxetine (CYMBALTA) 60 MG capsule       Take 1 capsule by mouth twice daily    Take 1 capsule by mouth twice daily    ESZOPICLONE (LUNESTA) 2 MG TAB eszopiclone (LUNESTA) 2 MG Tab       Take 1 tablet by mouth every night at bedtime    Take 1 tablet by mouth every night at bedtime    LAMOTRIGINE (LAMICTAL) 150 MG TAB lamoTRIgine (LAMICTAL) 150 MG Tab       Take 1 tablet (150 mg total) by mouth every morning.    Take 1 tablet by mouth at bedtime    LURASIDONE (LATUDA) 120 MG TAB lurasidone (LATUDA) 120 mg Tab       Take 1 tablet by mouth every night before bedtime    Take 1 tablet by mouth every night before bedtime   Discontinued Medications    ALBUTEROL (PROVENTIL/VENTOLIN HFA) 90 MCG/ACTUATION INHALER    Inhale 2 puffs into the lungs every 6 hours as needed for wheezing    ALBUTEROL (PROVENTIL/VENTOLIN HFA) 90 MCG/ACTUATION INHALER    Inhale 2 puffs into the lungs every 6 hours as needed    AMLODIPINE-VALSARTAN (EXFORGE)  MG PER TABLET    Take 1 tablet by mouth daily    ASPIRIN (ECOTRIN) 81 MG EC TABLET    Take 81 mg by mouth once daily.    ATORVASTATIN (LIPITOR) 20 MG TABLET    Take 1 tablet by mouth daily at bedtime    BUDESONIDE-FORMOTEROL 160-4.5 MCG (SYMBICORT) 160-4.5 MCG/ACTUATION HFAA    Inhale 2 puffs into the lungs every 12 hours    BUTALBITAL-ACETAMINOPHEN-CAFFEINE -40 MG (FIORICET, ESGIC) -40 MG PER  TABLET    Take by mouth.    DULOXETINE (CYMBALTA) 60 MG CAPSULE    Take 1 capsule by mouth at bedtime    DULOXETINE (CYMBALTA) 60 MG CAPSULE    Take 1 capsule by mouth twice daily    ESZOPICLONE (LUNESTA) 1 MG TAB    Take 1 tablet (1 mg total) by mouth at bedtime as needed    FENOFIBRATE (TRICOR) 145 MG TABLET    Take 1 tablet by mouth daily    FUROSEMIDE (LASIX) 20 MG TABLET    Take 1 tablet by mouth daily    LAMOTRIGINE (LAMICTAL) 200 MG TABLET    Take 1 tablet by mouth at bedtime    LEVOCETIRIZINE (XYZAL) 5 MG TABLET    Take 1 tablet by mouth daily at bedtime    LURASIDONE (LATUDA) 40 MG TAB TABLET    Take 1 tablet by mouth at night. Take with 60 mg tablet for a total dose of 100 mg    LURASIDONE (LATUDA) 60 MG TAB TABLET    Take 1 tablet by mouth daily with 350 calories    LURASIDONE (LATUDA) 60 MG TAB TABLET    Take 1 tablet by mouth at bedtime. Take with 40 mg tablet for a total dose of 100 mg    MAGNESIUM OXIDE (MAG-OX) 400 MG (241.3 MG MAGNESIUM) TABLET    Take 1 tablet by mouth daily    METFORMIN (GLUCOPHAGE) 1000 MG TABLET    Take 1 tablet by mouth twice daily    METOPROLOL SUCCINATE (TOPROL-XL) 50 MG 24 HR TABLET    Take 1 tablet by mouth at bedtime    MIRTAZAPINE (REMERON) 7.5 MG TAB    Take 7.5 mg by mouth every evening.    MIRTAZAPINE (REMERON) 7.5 MG TAB    Take 1 tablet by mouth at bedtime    NALTREXONE CAPSULE    Take 1 capsule (4.5 mg total) by mouth every evening.    OMEPRAZOLE (PRILOSEC) 40 MG CAPSULE    Take 1 capsule by mouth daily    SPIRONOLACTONE (ALDACTONE) 25 MG TABLET    Take 1 tablet by mouth once daily        Return to Clinic: 2 months      I spent an additional 47 minutes performing E/M services with >50% spent on counseling, guidance, coordinating care (not Psychotherapy related) in addition to the 20 minutes performing Psychotherapy.    Time spent with pt including note preparation: 67 minutes       Mariam Young, PhD, MP  Advanced Practice Medical Psychologist  [facility]  [address]  [place] [address]  [phone]   [phone] fax

## 2023-06-13 NOTE — PATIENT INSTRUCTIONS

## 2023-06-14 ENCOUNTER — OFFICE VISIT (OUTPATIENT)
Dept: PODIATRY | Facility: CLINIC | Age: 53
End: 2023-06-14
Payer: MEDICAID

## 2023-06-14 DIAGNOSIS — L60.0 INGROWN RIGHT GREATER TOENAIL: Primary | ICD-10-CM

## 2023-06-14 DIAGNOSIS — E11.42 DIABETIC POLYNEUROPATHY ASSOCIATED WITH TYPE 2 DIABETES MELLITUS: ICD-10-CM

## 2023-06-14 DIAGNOSIS — E11.65 TYPE 2 DIABETES MELLITUS WITH HYPERGLYCEMIA, WITH LONG-TERM CURRENT USE OF INSULIN: ICD-10-CM

## 2023-06-14 DIAGNOSIS — Z79.4 TYPE 2 DIABETES MELLITUS WITH HYPERGLYCEMIA, WITH LONG-TERM CURRENT USE OF INSULIN: ICD-10-CM

## 2023-06-14 DIAGNOSIS — E11.65 UNCONTROLLED TYPE 2 DIABETES MELLITUS WITH HYPERGLYCEMIA: ICD-10-CM

## 2023-06-14 DIAGNOSIS — E11.69 TYPE 2 DIABETES MELLITUS WITH OTHER SPECIFIED COMPLICATION, WITH LONG-TERM CURRENT USE OF INSULIN: ICD-10-CM

## 2023-06-14 DIAGNOSIS — Z79.4 TYPE 2 DIABETES MELLITUS WITH OTHER SPECIFIED COMPLICATION, WITH LONG-TERM CURRENT USE OF INSULIN: ICD-10-CM

## 2023-06-14 DIAGNOSIS — N95.2 VAGINAL ATROPHY: ICD-10-CM

## 2023-06-14 PROCEDURE — 99212 OFFICE O/P EST SF 10 MIN: CPT | Mod: PBBFAC | Performed by: PODIATRIST

## 2023-06-14 PROCEDURE — 1159F MED LIST DOCD IN RCRD: CPT | Mod: CPTII,,, | Performed by: PODIATRIST

## 2023-06-14 PROCEDURE — 11730 AVULSION NAIL PLATE SIMPLE 1: CPT | Mod: T5,PBBFAC | Performed by: PODIATRIST

## 2023-06-14 PROCEDURE — 99999 PR PBB SHADOW E&M-EST. PATIENT-LVL II: CPT | Mod: PBBFAC,,, | Performed by: PODIATRIST

## 2023-06-14 PROCEDURE — 1160F PR REVIEW ALL MEDS BY PRESCRIBER/CLIN PHARMACIST DOCUMENTED: ICD-10-PCS | Mod: CPTII,,, | Performed by: PODIATRIST

## 2023-06-14 PROCEDURE — 1160F RVW MEDS BY RX/DR IN RCRD: CPT | Mod: CPTII,,, | Performed by: PODIATRIST

## 2023-06-14 PROCEDURE — 3044F PR MOST RECENT HEMOGLOBIN A1C LEVEL <7.0%: ICD-10-PCS | Mod: CPTII,,, | Performed by: PODIATRIST

## 2023-06-14 PROCEDURE — 99213 PR OFFICE/OUTPT VISIT, EST, LEVL III, 20-29 MIN: ICD-10-PCS | Mod: 25,S$PBB,, | Performed by: PODIATRIST

## 2023-06-14 PROCEDURE — 4010F ACE/ARB THERAPY RXD/TAKEN: CPT | Mod: CPTII,,, | Performed by: PODIATRIST

## 2023-06-14 PROCEDURE — 1159F PR MEDICATION LIST DOCUMENTED IN MEDICAL RECORD: ICD-10-PCS | Mod: CPTII,,, | Performed by: PODIATRIST

## 2023-06-14 PROCEDURE — 11730 NAIL REMOVAL: ICD-10-PCS | Mod: T5,S$PBB,, | Performed by: PODIATRIST

## 2023-06-14 PROCEDURE — 99999 PR PBB SHADOW E&M-EST. PATIENT-LVL II: ICD-10-PCS | Mod: PBBFAC,,, | Performed by: PODIATRIST

## 2023-06-14 PROCEDURE — 3072F LOW RISK FOR RETINOPATHY: CPT | Mod: CPTII,,, | Performed by: PODIATRIST

## 2023-06-14 PROCEDURE — 3044F HG A1C LEVEL LT 7.0%: CPT | Mod: CPTII,,, | Performed by: PODIATRIST

## 2023-06-14 PROCEDURE — 4010F PR ACE/ARB THEARPY RXD/TAKEN: ICD-10-PCS | Mod: CPTII,,, | Performed by: PODIATRIST

## 2023-06-14 PROCEDURE — 3072F PR LOW RISK FOR RETINOPATHY: ICD-10-PCS | Mod: CPTII,,, | Performed by: PODIATRIST

## 2023-06-14 PROCEDURE — 99213 OFFICE O/P EST LOW 20 MIN: CPT | Mod: 25,S$PBB,, | Performed by: PODIATRIST

## 2023-06-14 RX ORDER — ESTRADIOL 0.1 MG/G
1 CREAM VAGINAL
Qty: 42.5 G | Refills: 2 | Status: SHIPPED | OUTPATIENT
Start: 2023-06-15 | End: 2023-09-13 | Stop reason: SDUPTHER

## 2023-06-14 RX ORDER — METFORMIN HYDROCHLORIDE 1000 MG/1
1000 TABLET ORAL 2 TIMES DAILY WITH MEALS
Qty: 180 TABLET | Refills: 1 | Status: SHIPPED | OUTPATIENT
Start: 2023-06-14 | End: 2023-08-23 | Stop reason: SDUPTHER

## 2023-06-14 RX ORDER — METFORMIN HYDROCHLORIDE 1000 MG/1
1000 TABLET ORAL 2 TIMES DAILY WITH MEALS
Qty: 180 TABLET | Refills: 1 | Status: SHIPPED | OUTPATIENT
Start: 2023-06-14 | End: 2024-01-31 | Stop reason: SDUPTHER

## 2023-06-14 RX ORDER — INSULIN GLARGINE 300 U/ML
84 INJECTION, SOLUTION SUBCUTANEOUS DAILY
Qty: 6 ML | Refills: 2 | Status: SHIPPED | OUTPATIENT
Start: 2023-06-14 | End: 2023-07-26 | Stop reason: SDUPTHER

## 2023-06-14 RX ORDER — PEN NEEDLE, DIABETIC 30 GX3/16"
NEEDLE, DISPOSABLE MISCELLANEOUS
Qty: 200 EACH | Refills: 3 | Status: SHIPPED | OUTPATIENT
Start: 2023-06-14 | End: 2024-01-10 | Stop reason: SDUPTHER

## 2023-06-14 RX ORDER — INSULIN GLARGINE 300 U/ML
84 INJECTION, SOLUTION SUBCUTANEOUS DAILY
Qty: 6 ML | Refills: 2
Start: 2023-06-14 | End: 2023-06-14 | Stop reason: SDUPTHER

## 2023-06-14 NOTE — PROCEDURES
Nail Removal    Date/Time: 6/14/2023 11:30 AM  Performed by: Mena Bond DPM  Authorized by: Mena Bond DPM     Consent Done?:  Yes (Written)  Time out: Immediately prior to the procedure a time out was called    Prep: patient was prepped and draped in usual sterile fashion    Location:     Location:  Right foot    Location detail:  Right big toe  Anesthesia:     Anesthesia:  Digital block    Local anesthetic:  Lidocaine 1% without epinephrine (0.75% Marcaine plain)    Anesthetic total (ml):  3  Procedure Details:     Preparation:  Skin prepped with alcohol and skin prepped with Betadine    Side:  Medial    Wedge excision of skin of nail fold: No      Nail bed sutured?: No      Nail matrix removed:  None    Dressing applied:  4x4, antibiotic ointment and dressing applied    Patient tolerance:  Patient tolerated the procedure well with no immediate complications

## 2023-06-14 NOTE — PROGRESS NOTES
Subjective:       Patient ID: Yolanda Betts is a 53 y.o. female.    Chief Complaint: Ingrown Toenail (Right medial hallux ingrown. Denies pain at present and is a diabetic. )      HPI: Yolanda Betts presents to the office with complaints of pains to the right great  toe, due to ingrowing. States no drainage. States swelling, redness and moderate to severe pains. Symptoms have been on going for several days and are worsening. States difficulties with walking as a result of pains. Walking and standing, particularly with shoe gear, exacerbates the ailment. Pains are sharp in nature and are rated at approx. 1-2/10. Patient's Primary Care Provider is Sasha Sorenson MD.     Review of patient's allergies indicates:   Allergen Reactions    Codeine Itching    Hydromorphone Other (See Comments)     Can't wake up for long time if taken  Slow to wake up after surgery after receiving    Aleve [naproxen sodium]     Lyrica [pregabalin] Itching    Motrin [ibuprofen]     Neuromuscular blockers, steroidal Hives     some    Latex, natural rubber Rash    Morphine Rash     itching    Norco [hydrocodone-acetaminophen] Itching, Rash and Hallucinations    Seconal [secobarbital sodium] Rash     itching    Tylox [oxycodone-acetaminophen] Rash       Past Medical History:   Diagnosis Date    Abnormal Pap smear of cervix     HPV genital warts    Anemia     Anxiety     Arthritis     Asthma 10/11/2016    Bipolar 1 disorder     Diabetes mellitus, type 2     Dyslipidemia associated with type 2 diabetes mellitus 5/13/2019    General anesthetics causing adverse effect in therapeutic use     Genital warts     GERD (gastroesophageal reflux disease)     Herpes simplex virus (HSV) infection     Hyperlipidemia     Hypertension     Hypertension complicating diabetes 5/5/2019    Migraine with aura and without status migrainosus, not intractable 3/21/2016    Mild persistent asthma without complication 10/11/2016    Morbid obesity with body  "mass index (BMI) of 45.0 to 49.9 in adult 8/3/2017    Obstructive sleep apnea     ALEN (obstructive sleep apnea)     2L per N/C q HS    Schizoaffective disorder, bipolar type 4/25/2019    Seasonal allergic rhinitis due to pollen 5/13/2019    Type 2 diabetes mellitus with hyperglycemia, with long-term current use of insulin 12/21/2017    Type 2 diabetes mellitus with hyperglycemia, without long-term current use of insulin 12/21/2017       Family History   Problem Relation Age of Onset    Diabetes Mother     Hyperlipidemia Mother     Hypertension Mother     Asthma Mother     COPD Mother     Glaucoma Mother     Thyroid disease Mother     Anesthesia problems Mother         "almost had a cardiac arrest" , blood clots    Hypertension Father     Hyperlipidemia Father     Cancer Father         Brain, lung, liver, kidney    Heart disease Maternal Grandmother     Hyperlipidemia Maternal Grandmother     Hypertension Maternal Grandmother     Cataracts Maternal Grandmother     Diabetes Maternal Grandmother     Heart disease Maternal Grandfather     Hyperlipidemia Maternal Grandfather     Hypertension Maternal Grandfather     Glaucoma Maternal Grandfather     Cancer Maternal Grandfather     Cataracts Maternal Grandfather     Macular degeneration Maternal Grandfather     Diabetes Maternal Grandfather     Heart disease Paternal Grandmother     Hyperlipidemia Paternal Grandmother     Hypertension Paternal Grandmother     Cataracts Paternal Grandmother     Heart disease Paternal Grandfather     Hyperlipidemia Paternal Grandfather     Hypertension Paternal Grandfather     Cataracts Paternal Grandfather     Breast cancer Maternal Cousin     Breast cancer Maternal Cousin     Breast cancer Maternal Cousin     Breast cancer Maternal Cousin        Social History     Socioeconomic History    Marital status: Single   Tobacco Use    Smoking status: Never    Smokeless tobacco: Never   Substance and Sexual Activity    Alcohol use: Yes     " Alcohol/week: 0.0 standard drinks     Comment: socially  No alcohol 72h prior to sx    Drug use: No    Sexual activity: Yes     Partners: Male     Birth control/protection: Surgical     Comment: hyst   Social History Narrative    Long-term care nurse     Social Determinants of Health     Financial Resource Strain: High Risk    Difficulty of Paying Living Expenses: Very hard   Food Insecurity: Food Insecurity Present    Worried About Running Out of Food in the Last Year: Sometimes true    Ran Out of Food in the Last Year: Sometimes true   Transportation Needs: No Transportation Needs    Lack of Transportation (Medical): No    Lack of Transportation (Non-Medical): No   Physical Activity: Inactive    Days of Exercise per Week: 0 days    Minutes of Exercise per Session: 0 min   Stress: Stress Concern Present    Feeling of Stress : Very much   Social Connections: Moderately Isolated    Frequency of Communication with Friends and Family: More than three times a week    Frequency of Social Gatherings with Friends and Family: More than three times a week    Attends [employer] Services: More than 4 times per year    Active Member of Clubs or Organizations: No    Attends Club or Organization Meetings: Never    Marital Status:    Housing Stability: High Risk    Unable to Pay for Housing in the Last Year: Yes    Number of Places Lived in the Last Year: 1    Unstable Housing in the Last Year: No       Past Surgical History:   Procedure Laterality Date    abcess removed right back      BELT ABDOMINOPLASTY  1996    BREAST SURGERY Bilateral 1996    Reduction     SECTION      X 2    COLONOSCOPY      COLONOSCOPY N/A 2018    Procedure: colonoscopy for iron deficiency anemia;  Surgeon: Frankie Sanchez MD;  Location: [facility];  Service: Endoscopy;  Laterality: N/A;    COLONOSCOPY N/A 2018    Procedure: COLONOSCOPY;  Surgeon: Frankie Sanchez MD;  Location: [facility];  Service: Endoscopy;  Laterality:  N/A;    COLONOSCOPY N/A 3/10/2023    Procedure: COLONOSCOPY;  Surgeon: Lalita Montes De Oca MD;  Location: [facility] ENDO;  Service: Endoscopy;  Laterality: N/A;    EPIDURAL STEROID INJECTION INTO CERVICAL SPINE N/A 1/31/2023    Procedure: C6/7 IL ROCAEL RN IV Sedation;  Surgeon: Otto Phillips MD;  Location: [facility] PAIN MGT;  Service: Pain Management;  Laterality: N/A;    ESOPHAGOGASTRODUODENOSCOPY N/A 3/10/2023    Procedure: EGD (ESOPHAGOGASTRODUODENOSCOPY);  Surgeon: Lalita Montes De Oca MD;  Location: [facility] ENDO;  Service: Endoscopy;  Laterality: N/A;    HYSTERECTOMY      w/ BSO; hypermenorrhea    MOUTH SURGERY  1996    OOPHORECTOMY      hyst/bso, hypermenorrhea    ROBOT-ASSISTED CHOLECYSTECTOMY USING DA DARI XI N/A 1/3/2020    Procedure: XI ROBOTIC CHOLECYSTECTOMY;  Surgeon: Damir Driscoll MD;  Location: [facility] OR;  Service: General;  Laterality: N/A;    TOTAL REDUCTION MAMMOPLASTY      TUBAL LIGATION         Review of Systems      Objective:   There were no vitals taken for this visit.    Physical Exam  LOWER EXTREMITY PHYSICAL EXAMINATION    NEUROLOGY: Sensation to light touch is intact. Proprioception is intact.     VASCULAR: On the right foot, the dorsalis pedis pulse is 2/4 and the posterior tibial pulse is 2/4. Capillary refill time is less than 3 seconds. Hair growth is present on the dorsum of the foot and at the digits. Proximal to distal temperature is warm to warm.    DERMATOLOGY: Ingrowing of the right foot medial nail border of the great toe. The nail is incurvated into the skin of the affected border, causing pains, which are moderate in nature. There is minimal edema. There is no cellulitis noted. There is no drainage. No fluctuance. Granuloma formation is absent.    ORTHOPEDIC: Manual Muscle Testing is 5/5 in all planes on the right, without pains, with and without resistance. Gait pattern is slightly antalgic.    Assessment:     1. Ingrown right greater toenail    2. Diabetic polyneuropathy associated  with type 2 diabetes mellitus        Plan:     Ingrown right greater toenail  -     Ambulatory referral/consult to Podiatry    Diabetic polyneuropathy associated with type 2 diabetes mellitus      We discussed patient's options for treating the ingrown toenail.  We discussed slant back procedure to remove the distal offending edge, we discussed temporary partial avulsion, temporary complete avulsion, and attempted permanent matricectomy.  Patient would like to proceed with temporary avulsion.  Discussed the risk and benefits of the procedure.  Discussed risk of recurrence.  Patient did give written consent to proceed with procedure.    Patient tolerated procedure well without complications.  Large spicule was removed without complications.  History patient will start soaking in warm water and Epson salt twice daily.  Apply antibiotic cream and a Band-Aid to the affected borders following soaking and showering.  Patient will call if there is any acute signs of infection associated with increased redness, swelling, abnormal drainage, increased pain.    Foot counseling and education is provided at this visit. Patient is advised to wear socks and shoes at all times.  Do not walk barefoot, or with just socks, even when indoors.  Be sure to check and inspect the inside of the shoe before putting them on her feet.  Protect your feet at all times.  Walking shoes and/or athletic shoes are the best types of shoe gear. Do not wear vinyl or plastic type shoe gear, as they do not stretch and/or breathe.  Protect your feet from hot and/or cold. Elevate the extremities when sitting.  Do not wear excessively tight socks and/or shoe gear. Wiggle your toes for a few minutes throughout the day. Move your ankles up and down, in and out, to help blood flow in your lower extremity.         Future Appointments   Date Time Provider Department Center   6/27/2023  9:00 AM Mohini Wyman PA-C [facility] RHEUM [facility]   7/10/2023  8:20 AM  Jaimie Wills MD [facility] CARDIO [facility]   7/12/2023 10:00 AM Sasha Sorenson MD [facility] PRI [place]   8/10/2023  8:00 AM [facility]   9/7/2023  8:00 AM Elizabeth Ceron NP [facility] DIABETE [facility]   9/25/2023  1:00 PM JERMAN WuW [facility] PSYCH [facility]   10/2/2023  7:30 AM [facility] MAMMO1-SCR [facility] MAMMO [facility]

## 2023-06-19 ENCOUNTER — TELEPHONE (OUTPATIENT)
Dept: PHARMACY | Facility: CLINIC | Age: 53
End: 2023-06-19
Payer: MEDICAID

## 2023-06-19 NOTE — TELEPHONE ENCOUNTER
Hello,     The prior authorization for Yolanda Betts's DULOXETINE prescription has been APPROVED FROM 06/16/2023 TO 06/16/2024 with copayment of $0.       [facility] [place] @ [phone] will reach out to patient for further correspondence.       If there are any additional questions or concerns, please contact me.    Sincerely,  Nahomy Linton  Prior Authorization Department  [facility]  [phone]

## 2023-06-19 NOTE — TELEPHONE ENCOUNTER
Hello,     The prior authorization for Yolanda Betts's FROVATRIPTAN (9 PER 30) prescription has been APPROVED FROM 06/16/2023 TO 06/15/2024 with copayment of $0.       [facility] [place] @ [phone] will reach out to patient for further correspondence.       If there are any additional questions or concerns, please contact me.    Sincerely,  Nahomy Linton  Prior Authorization Department  [facility]  [phone]

## 2023-06-20 ENCOUNTER — TELEPHONE (OUTPATIENT)
Dept: DIABETES | Facility: CLINIC | Age: 53
End: 2023-06-20
Payer: MEDICAID

## 2023-06-20 ENCOUNTER — TELEPHONE (OUTPATIENT)
Dept: PHARMACY | Facility: CLINIC | Age: 53
End: 2023-06-20
Payer: MEDICAID

## 2023-06-20 NOTE — TELEPHONE ENCOUNTER
Hello,     The prior authorization for Yolanda Betts's LUNESTA prescription has been APPROVED FROM 06/20/2023 TO 09/19/2023 with copayment of $0.       [facility] [place] @ [phone] will reach out to patient for further correspondence.       If there are any additional questions or concerns, please contact me.    Sincerely,  Nahomy Linton  Prior Authorization Department  [facility]  [phone]

## 2023-06-20 NOTE — TELEPHONE ENCOUNTER
----- Message from Sara Matta sent at 6/20/2023  2:59 PM CDT -----  Contact: Consuelo/[facility]/DOMINICK  Consuelo is calling in regards to documents she received in regards to pt. Pt birth date was incorrect. Correct: 1970 Incorrect: 05/17/1973. Just need to initial, date, and sign. Send back to fax [phone] or [place]. Call back [phone]          Thanks  ROHINI

## 2023-06-20 NOTE — TELEPHONE ENCOUNTER
----- Message from Mei Newell sent at 6/20/2023  3:03 PM CDT -----  Regarding: pt meds  Name of Who is Calling:PILO YANCEY [[id number]]        What is the request in detail: Pt requesting Dextom pt stated she never received from the nurse please advise            Can the clinic reply by [facility]: no        [place] Number to [place] Back [place] [facility] [place] [facility]: Telephone Information:  Mobile          [phone]

## 2023-06-20 NOTE — TELEPHONE ENCOUNTER
Advise pt I have corrected the [facility] for [facility] and [facility]

## 2023-06-24 DIAGNOSIS — I10 ESSENTIAL HYPERTENSION: Chronic | ICD-10-CM

## 2023-06-27 ENCOUNTER — OFFICE VISIT (OUTPATIENT)
Dept: RHEUMATOLOGY | Facility: CLINIC | Age: 53
End: 2023-06-27
Payer: MEDICAID

## 2023-06-27 ENCOUNTER — LAB VISIT (OUTPATIENT)
Dept: LAB | Facility: HOSPITAL | Age: 53
End: 2023-06-27
Attending: PHYSICIAN ASSISTANT
Payer: MEDICAID

## 2023-06-27 ENCOUNTER — IMMUNIZATION (OUTPATIENT)
Dept: PHARMACY | Facility: CLINIC | Age: 53
End: 2023-06-27
Payer: MEDICAID

## 2023-06-27 VITALS
SYSTOLIC BLOOD PRESSURE: 105 MMHG | DIASTOLIC BLOOD PRESSURE: 73 MMHG | WEIGHT: 172.38 LBS | BODY MASS INDEX: 38.78 KG/M2 | HEART RATE: 75 BPM | HEIGHT: 56 IN

## 2023-06-27 DIAGNOSIS — Z79.899 HIGH RISK MEDICATION USE: ICD-10-CM

## 2023-06-27 DIAGNOSIS — R79.82 ELEVATED C-REACTIVE PROTEIN (CRP): ICD-10-CM

## 2023-06-27 DIAGNOSIS — M47.819 SPONDYLOARTHROPATHY: ICD-10-CM

## 2023-06-27 DIAGNOSIS — D84.9 IMMUNOCOMPROMISED: ICD-10-CM

## 2023-06-27 DIAGNOSIS — M47.819 SPONDYLOARTHROPATHY: Primary | ICD-10-CM

## 2023-06-27 DIAGNOSIS — Z51.81 MEDICATION MONITORING ENCOUNTER: ICD-10-CM

## 2023-06-27 LAB
CRP SERPL-MCNC: 21.9 MG/L (ref 0–8.2)
HAV IGM SERPL QL IA: NORMAL
HBV CORE IGM SERPL QL IA: NORMAL
HBV SURFACE AG SERPL QL IA: NORMAL
HCV AB SERPL QL IA: NORMAL

## 2023-06-27 PROCEDURE — 1159F PR MEDICATION LIST DOCUMENTED IN MEDICAL RECORD: ICD-10-PCS | Mod: CPTII,,, | Performed by: PHYSICIAN ASSISTANT

## 2023-06-27 PROCEDURE — 3044F PR MOST RECENT HEMOGLOBIN A1C LEVEL <7.0%: ICD-10-PCS | Mod: CPTII,,, | Performed by: PHYSICIAN ASSISTANT

## 2023-06-27 PROCEDURE — 99215 PR OFFICE/OUTPT VISIT, EST, LEVL V, 40-54 MIN: ICD-10-PCS | Mod: S$PBB,,, | Performed by: PHYSICIAN ASSISTANT

## 2023-06-27 PROCEDURE — 3078F DIAST BP <80 MM HG: CPT | Mod: CPTII,,, | Performed by: PHYSICIAN ASSISTANT

## 2023-06-27 PROCEDURE — 4010F PR ACE/ARB THEARPY RXD/TAKEN: ICD-10-PCS | Mod: CPTII,,, | Performed by: PHYSICIAN ASSISTANT

## 2023-06-27 PROCEDURE — 3072F LOW RISK FOR RETINOPATHY: CPT | Mod: CPTII,,, | Performed by: PHYSICIAN ASSISTANT

## 2023-06-27 PROCEDURE — 99215 OFFICE O/P EST HI 40 MIN: CPT | Mod: PBBFAC | Performed by: PHYSICIAN ASSISTANT

## 2023-06-27 PROCEDURE — 1160F PR REVIEW ALL MEDS BY PRESCRIBER/CLIN PHARMACIST DOCUMENTED: ICD-10-PCS | Mod: CPTII,,, | Performed by: PHYSICIAN ASSISTANT

## 2023-06-27 PROCEDURE — 3044F HG A1C LEVEL LT 7.0%: CPT | Mod: CPTII,,, | Performed by: PHYSICIAN ASSISTANT

## 2023-06-27 PROCEDURE — 3074F PR MOST RECENT SYSTOLIC BLOOD PRESSURE < 130 MM HG: ICD-10-PCS | Mod: CPTII,,, | Performed by: PHYSICIAN ASSISTANT

## 2023-06-27 PROCEDURE — 3072F PR LOW RISK FOR RETINOPATHY: ICD-10-PCS | Mod: CPTII,,, | Performed by: PHYSICIAN ASSISTANT

## 2023-06-27 PROCEDURE — 3078F PR MOST RECENT DIASTOLIC BLOOD PRESSURE < 80 MM HG: ICD-10-PCS | Mod: CPTII,,, | Performed by: PHYSICIAN ASSISTANT

## 2023-06-27 PROCEDURE — 4010F ACE/ARB THERAPY RXD/TAKEN: CPT | Mod: CPTII,,, | Performed by: PHYSICIAN ASSISTANT

## 2023-06-27 PROCEDURE — 99999 PR PBB SHADOW E&M-EST. PATIENT-LVL V: ICD-10-PCS | Mod: PBBFAC,,, | Performed by: PHYSICIAN ASSISTANT

## 2023-06-27 PROCEDURE — 3008F BODY MASS INDEX DOCD: CPT | Mod: CPTII,,, | Performed by: PHYSICIAN ASSISTANT

## 2023-06-27 PROCEDURE — 1159F MED LIST DOCD IN RCRD: CPT | Mod: CPTII,,, | Performed by: PHYSICIAN ASSISTANT

## 2023-06-27 PROCEDURE — 99215 OFFICE O/P EST HI 40 MIN: CPT | Mod: S$PBB,,, | Performed by: PHYSICIAN ASSISTANT

## 2023-06-27 PROCEDURE — 86480 TB TEST CELL IMMUN MEASURE: CPT | Performed by: PHYSICIAN ASSISTANT

## 2023-06-27 PROCEDURE — 3074F SYST BP LT 130 MM HG: CPT | Mod: CPTII,,, | Performed by: PHYSICIAN ASSISTANT

## 2023-06-27 PROCEDURE — 3008F PR BODY MASS INDEX (BMI) DOCUMENTED: ICD-10-PCS | Mod: CPTII,,, | Performed by: PHYSICIAN ASSISTANT

## 2023-06-27 PROCEDURE — 36415 COLL VENOUS BLD VENIPUNCTURE: CPT | Performed by: PHYSICIAN ASSISTANT

## 2023-06-27 PROCEDURE — 86140 C-REACTIVE PROTEIN: CPT | Performed by: PHYSICIAN ASSISTANT

## 2023-06-27 PROCEDURE — 1160F RVW MEDS BY RX/DR IN RCRD: CPT | Mod: CPTII,,, | Performed by: PHYSICIAN ASSISTANT

## 2023-06-27 PROCEDURE — 80074 ACUTE HEPATITIS PANEL: CPT | Performed by: PHYSICIAN ASSISTANT

## 2023-06-27 PROCEDURE — 99999 PR PBB SHADOW E&M-EST. PATIENT-LVL V: CPT | Mod: PBBFAC,,, | Performed by: PHYSICIAN ASSISTANT

## 2023-06-27 RX ORDER — SECUKINUMAB 150 MG/ML
300 INJECTION SUBCUTANEOUS
Qty: 5 EACH | Refills: 0 | Status: SHIPPED | OUTPATIENT
Start: 2023-06-27 | End: 2023-07-03 | Stop reason: SDUPTHER

## 2023-06-27 RX ORDER — SECUKINUMAB 150 MG/ML
300 INJECTION SUBCUTANEOUS
Qty: 1 EACH | Refills: 2 | Status: SHIPPED | OUTPATIENT
Start: 2023-06-27 | End: 2023-07-03 | Stop reason: SDUPTHER

## 2023-06-27 NOTE — PROGRESS NOTES
Subjective:      Patient ID: Yolanda Betts is a 53 y.o. female.    Chief Complaint: Disease Management and Fibromyalgia      HPI   Yolanda Betts  is a 53 y.o. female who is her to f/u on fibromyalgia and inflammatory back pain.  Has seen Dr. Phillips who has her on Cymbalta 60 mg bid and tizanidine as below.  Both have helped her fibromyalgia symptoms.  She is still complaining of widespread diffuse pain.  She has failed gabapentin and Lyrica both in the past.  She does not take any pain medication.      At her 09/2022 visit, ordered labs.  CRP was elevated so I put her on prednisone 10 mg for 4 weeks.  I asked her to go back for blood work 4 weeks later to evaluate inflammatory markers.  She forgot to go and do that.  She says the steroids did help her pain.  They also increased blood sugars.  CRP improved after steroids.  Now off steroids and CRP elevated again.  Complaining also of lumbosacral pain as well as back pain.  Pain level is 9/10 in severity.  She denies radiating symptoms from the back.  Prior x-ray of the SI joints showed sclerosis suggestive of spondyloarthropathy.  MRI was ordered.  It has not been scheduled.  MRI of the SI joints was denied because she would previously had an MRI of her pelvis approved.  She has not had either exam.    She has had an EMG nerve conduction study showing carpal tunnel syndrome.  She is established with Dr. Roth.     Patient denies fevers, chills, photosensitivity, eye pain, shortness of breath, chest pain, hematuria, blood in the stool, rash, sicca symptoms, raynauds, finger ulcerations.  Rheumatologic systems otherwise negative.    Serologies/Labs:  Neg RF, CCP, ISAAC  Neg HLA B27  Previously elevated ESR/CRP   Current Treatment:  Tizanidine 4 mg tid  Cymbalta 60 mg bid  Previous Treatment:   Flexeril  Lyrica  Gabapentin  Savella  Low dose Naltrexone - no relief          Past Medical History:   Diagnosis Date    Abnormal Pap smear of cervix     HPV genital  "warts    Anemia     Anxiety     Arthritis     Asthma 10/11/2016    Bipolar 1 disorder     Diabetes mellitus, type 2     Dyslipidemia associated with type 2 diabetes mellitus 5/13/2019    General anesthetics causing adverse effect in therapeutic use     Genital warts     GERD (gastroesophageal reflux disease)     Herpes simplex virus (HSV) infection     Hyperlipidemia     Hypertension     Hypertension complicating diabetes 5/5/2019    Migraine with aura and without status migrainosus, not intractable 3/21/2016    Mild persistent asthma without complication 10/11/2016    Morbid obesity with body mass index (BMI) of 45.0 to 49.9 in adult 8/3/2017    Obstructive sleep apnea     ALEN (obstructive sleep apnea)     2L per N/C q HS    Schizoaffective disorder, bipolar type 4/25/2019    Seasonal allergic rhinitis due to pollen 5/13/2019    Type 2 diabetes mellitus with hyperglycemia, with long-term current use of insulin 12/21/2017    Type 2 diabetes mellitus with hyperglycemia, without long-term current use of insulin 12/21/2017     Family History   Problem Relation Age of Onset    Diabetes Mother     Hyperlipidemia Mother     Hypertension Mother     Asthma Mother     COPD Mother     Glaucoma Mother     Thyroid disease Mother     Anesthesia problems Mother         "almost had a cardiac arrest" , blood clots    Hypertension Father     Hyperlipidemia Father     Cancer Father         Brain, lung, liver, kidney    Heart disease Maternal Grandmother     Hyperlipidemia Maternal Grandmother     Hypertension Maternal Grandmother     Cataracts Maternal Grandmother     Diabetes Maternal Grandmother     Heart disease Maternal Grandfather     Hyperlipidemia Maternal Grandfather     Hypertension Maternal Grandfather     Glaucoma Maternal Grandfather     Cancer Maternal Grandfather     Cataracts Maternal Grandfather     Macular degeneration Maternal Grandfather     Diabetes Maternal Grandfather     Heart disease Paternal Grandmother     " Hyperlipidemia Paternal Grandmother     Hypertension Paternal Grandmother     Cataracts Paternal Grandmother     Heart disease Paternal Grandfather     Hyperlipidemia Paternal Grandfather     Hypertension Paternal Grandfather     Cataracts Paternal Grandfather     Breast cancer Maternal Cousin     Breast cancer Maternal Cousin     Breast cancer Maternal Cousin     Breast cancer Maternal Cousin      Social History     Socioeconomic History    Marital status: Single   Tobacco Use    Smoking status: Never    Smokeless tobacco: Never   Substance and Sexual Activity    Alcohol use: Yes     Alcohol/week: 0.0 standard drinks     Comment: socially  No alcohol 72h prior to sx    Drug use: No    Sexual activity: Yes     Partners: Male     Birth control/protection: Surgical     Comment: hyst   Social History Narrative    Long-term care nurse     Social Determinants of Health     Financial Resource Strain: High Risk    Difficulty of Paying Living Expenses: Very hard   Food Insecurity: Food Insecurity Present    Worried About Running Out of Food in the Last Year: Sometimes true    Ran Out of Food in the Last Year: Sometimes true   Transportation Needs: No Transportation Needs    Lack of Transportation (Medical): No    Lack of Transportation (Non-Medical): No   Physical Activity: Inactive    Days of Exercise per Week: 0 days    Minutes of Exercise per Session: 0 min   Stress: Stress Concern Present    Feeling of Stress : Very much   Social Connections: Moderately Isolated    Frequency of Communication with Friends and Family: More than three times a week    Frequency of Social Gatherings with Friends and Family: More than three times a week    Attends Nondenominational Services: More than 4 times per year    Active Member of Clubs or Organizations: No    Attends Club or Organization Meetings: Never    Marital Status:    Housing Stability: High Risk    Unable to Pay for Housing in the Last Year: Yes    Number of Places Lived in  "the Last Year: 1    Unstable Housing in the Last Year: No     Review of patient's allergies indicates:   Allergen Reactions    Codeine Itching    Hydromorphone Other (See Comments)     Can't wake up for long time if taken  Slow to wake up after surgery after receiving    Aleve [naproxen sodium]     Lyrica [pregabalin] Itching    Motrin [ibuprofen]     Neuromuscular blockers, steroidal Hives     some    Latex, natural rubber Rash    Morphine Rash     itching    Norco [hydrocodone-acetaminophen] Itching, Rash and Hallucinations    Seconal [secobarbital sodium] Rash     itching    Tylox [oxycodone-acetaminophen] Rash       Objective:   /73   Pulse 75   Ht 4' 8" (1.422 m)   Wt 78.2 kg (172 lb 6.4 oz)   BMI 38.65 kg/m²   Immunization History   Administered Date(s) Administered    COVID-19, MRNA, LN-S, PF (MODERNA FULL 0.5 ML DOSE) 03/30/2021, 04/26/2021    Influenza 10/01/2018    Influenza - Quadrivalent - PF *Preferred* (6 months and older) 12/21/2017, 09/24/2020, 02/16/2023    Pneumococcal Conjugate - 20 Valent 01/17/2023    Pneumococcal Polysaccharide - 23 Valent 01/31/2017    Tdap 12/21/2017    Zoster Recombinant 01/17/2023, 06/27/2023       Physical Exam   Constitutional: She is oriented to person, place, and time. No distress.   HENT:   Head: Normocephalic and atraumatic.   Pulmonary/Chest: Effort normal.   Abdominal: She exhibits no distension.   Musculoskeletal:         General: No swelling or tenderness. Normal range of motion.      Cervical back: Normal range of motion.   Lymphadenopathy:     She has no cervical adenopathy.   Neurological: She is alert and oriented to person, place, and time.   Skin: Skin is warm and dry. No rash noted.   Psychiatric: Mood normal.   Nursing note and vitals reviewed.    Mulitple tender trigger pionts  Sign ttp Bilat SI joints        Recent Results (from the past 672 hour(s))   MAGNESIUM    Collection Time: 06/22/23 11:02 AM   Result Value Ref Range    Magnesium Level " 1.9 1.6 - 2.6 MG/DL   TROPONIN    Collection Time: 06/22/23 11:02 AM   Result Value Ref Range    Troponin I <0.01 0.00 - 0.03 ng/mL   C-Reactive Protein    Collection Time: 06/27/23  9:53 AM   Result Value Ref Range    CRP 21.9 (H) 0.0 - 8.2 mg/L       No results found for: TBGOLDPLUS   Lab Results   Component Value Date    HEPAIGM Non-reactive 02/06/2023    HEPBIGM Non-reactive 02/06/2023    HEPCAB Non-reactive 02/06/2023        Imaging  I have personally reviewed images and reports as below.  I agree with the interpretation.    X-Ray Sacroiliac Joints Complete  Order: [id number]  Status: Final result     Visible to patient: Yes (seen)     Next appt: 07/10/2023 at 08:20 AM in Cardiology (Jaimie Wills MD)     Dx: Fibromyalgia; Lumbosacral pain     0 Result Notes  Details    Reading Physician Reading Date Result Priority   Chandu Coyle MD  [phone] 1/13/2023      Narrative & Impression  EXAMINATION:  XR SACROILIAC JOINTS COMPLETE     CLINICAL HISTORY:  Fibromyalgia     TECHNIQUE:  As above     COMPARISON:  None     FINDINGS:  The bilateral SI joints are within normal limits without widening.  Questionable asymmetric sclerosis is identified along the iliac aspect.  The remaining osseous structures are intact.     Impression:     Normal appearance of the SI joints, however, asymmetric sclerosis is identified of the iliac aspects of the bilateral SI joints.  Query for seronegative spondyloarthropathy.        Electronically signed by: Chandu Coyle MD  Date:                                            01/13/2023  Time:                                           16:18       Assessment:     1. Spondyloarthropathy    2. Elevated C-reactive protein (CRP)    3. Immunocompromised    4. High risk medication use    5. Medication monitoring encounter                Plan:     Yolanda was seen today for disease management and fibromyalgia.    Diagnoses and all orders for this visit:    Spondyloarthropathy  -     CBC Auto  Differential; Standing  -     Sedimentation rate; Standing  -     C-Reactive Protein; Standing  -     Hepatitis Panel, Acute; Standing  -     Quantiferon Gold TB; Standing  -     Protein Electrophoresis, Serum; Future  -     Immunofixation Electrophoresis; Future    Elevated C-reactive protein (CRP)  -     Protein Electrophoresis, Serum; Future  -     Immunofixation Electrophoresis; Future    Immunocompromised  -     Hepatitis Panel, Acute; Standing  -     Quantiferon Gold TB; Standing    High risk medication use  -     Hepatitis Panel, Acute; Standing  -     Quantiferon Gold TB; Standing    Medication monitoring encounter          Chronic low back pain w elevations in CRP in past improved w PDN consistent w Spondyloarthropathy  Cosentyx as above  Discussed risks and benefits of the medication including increased risk of infection (sometimes fatal), malignancy, lab anormalities, inj site rxn.  Literature provided to the patient  Check CBC CMP in 6 weeks  C/w f/u IPM as scheduled  On cymbalta 60 mg bid w tizanidine  FM w fatigue  C/w current mgmt  Return to clinic: 12 wks reg 4 two weeks prior    Follow up in about 12 weeks (around 9/19/2023).    The patient understands, chooses and consents to this plan and accepts all the risks which include but are not limited to the risks mentioned above.     Disclaimer: This note was prepared using a voice recognition system and is likely to have sound alike errors within the text.

## 2023-06-28 LAB
GAMMA INTERFERON BACKGROUND BLD IA-ACNC: 0.26 IU/ML
M TB IFN-G CD4+ BCKGRND COR BLD-ACNC: 0.07 IU/ML
MITOGEN IGNF BCKGRD COR BLD-ACNC: 9.74 IU/ML
TB GOLD PLUS: NEGATIVE
TB2 - NIL: -0.13 IU/ML

## 2023-06-29 RX ORDER — METOPROLOL SUCCINATE 50 MG/1
TABLET, EXTENDED RELEASE ORAL
Qty: 90 TABLET | Refills: 3 | Status: SHIPPED | OUTPATIENT
Start: 2023-06-29 | End: 2023-07-10

## 2023-07-03 ENCOUNTER — TELEPHONE (OUTPATIENT)
Dept: PHARMACY | Facility: CLINIC | Age: 53
End: 2023-07-03
Payer: MEDICAID

## 2023-07-03 NOTE — TELEPHONE ENCOUNTER
Lakhwinder, this is Tierra Galan, clinical pharmacist with [facility] that is part of your care team.  We have begun working on your prescription that your doctor has sent us. Our next steps include:     Working with your insurance company to obtain approval for your medication  Working with you to ensure your medication is affordable     We will be calling you along the way with updates on your medication but if you have any concerns or receive information that you would like to discuss please reach us at [phone].    Welcome call outcome: Patient/caregiver reached.

## 2023-07-10 ENCOUNTER — OFFICE VISIT (OUTPATIENT)
Dept: CARDIOLOGY | Facility: CLINIC | Age: 53
End: 2023-07-10
Payer: MEDICAID

## 2023-07-10 VITALS
WEIGHT: 171.5 LBS | HEART RATE: 88 BPM | DIASTOLIC BLOOD PRESSURE: 88 MMHG | BODY MASS INDEX: 38.58 KG/M2 | SYSTOLIC BLOOD PRESSURE: 131 MMHG | HEIGHT: 56 IN

## 2023-07-10 DIAGNOSIS — I15.2 HYPERTENSION COMPLICATING DIABETES: ICD-10-CM

## 2023-07-10 DIAGNOSIS — F41.1 GENERALIZED ANXIETY DISORDER: Chronic | ICD-10-CM

## 2023-07-10 DIAGNOSIS — E78.1 HYPERTRIGLYCERIDEMIA: ICD-10-CM

## 2023-07-10 DIAGNOSIS — G43.109 MIGRAINE WITH AURA AND WITHOUT STATUS MIGRAINOSUS, NOT INTRACTABLE: Chronic | ICD-10-CM

## 2023-07-10 DIAGNOSIS — I10 ESSENTIAL HYPERTENSION: Primary | ICD-10-CM

## 2023-07-10 DIAGNOSIS — E11.42 DIABETIC POLYNEUROPATHY ASSOCIATED WITH TYPE 2 DIABETES MELLITUS: Chronic | ICD-10-CM

## 2023-07-10 DIAGNOSIS — E11.59 HYPERTENSION COMPLICATING DIABETES: ICD-10-CM

## 2023-07-10 DIAGNOSIS — F25.0 SCHIZOAFFECTIVE DISORDER, BIPOLAR TYPE: Chronic | ICD-10-CM

## 2023-07-10 DIAGNOSIS — F31.9 BIPOLAR 1 DISORDER: Chronic | ICD-10-CM

## 2023-07-10 PROCEDURE — 3008F PR BODY MASS INDEX (BMI) DOCUMENTED: ICD-10-PCS | Mod: CPTII,,, | Performed by: INTERNAL MEDICINE

## 2023-07-10 PROCEDURE — 3075F PR MOST RECENT SYSTOLIC BLOOD PRESS GE 130-139MM HG: ICD-10-PCS | Mod: CPTII,,, | Performed by: INTERNAL MEDICINE

## 2023-07-10 PROCEDURE — 4010F PR ACE/ARB THEARPY RXD/TAKEN: ICD-10-PCS | Mod: CPTII,,, | Performed by: INTERNAL MEDICINE

## 2023-07-10 PROCEDURE — 3079F PR MOST RECENT DIASTOLIC BLOOD PRESSURE 80-89 MM HG: ICD-10-PCS | Mod: CPTII,,, | Performed by: INTERNAL MEDICINE

## 2023-07-10 PROCEDURE — 1159F MED LIST DOCD IN RCRD: CPT | Mod: CPTII,,, | Performed by: INTERNAL MEDICINE

## 2023-07-10 PROCEDURE — 3008F BODY MASS INDEX DOCD: CPT | Mod: CPTII,,, | Performed by: INTERNAL MEDICINE

## 2023-07-10 PROCEDURE — 99999 PR PBB SHADOW E&M-EST. PATIENT-LVL IV: ICD-10-PCS | Mod: PBBFAC,,, | Performed by: INTERNAL MEDICINE

## 2023-07-10 PROCEDURE — 3044F HG A1C LEVEL LT 7.0%: CPT | Mod: CPTII,,, | Performed by: INTERNAL MEDICINE

## 2023-07-10 PROCEDURE — 1159F PR MEDICATION LIST DOCUMENTED IN MEDICAL RECORD: ICD-10-PCS | Mod: CPTII,,, | Performed by: INTERNAL MEDICINE

## 2023-07-10 PROCEDURE — 3079F DIAST BP 80-89 MM HG: CPT | Mod: CPTII,,, | Performed by: INTERNAL MEDICINE

## 2023-07-10 PROCEDURE — 3072F PR LOW RISK FOR RETINOPATHY: ICD-10-PCS | Mod: CPTII,,, | Performed by: INTERNAL MEDICINE

## 2023-07-10 PROCEDURE — 4010F ACE/ARB THERAPY RXD/TAKEN: CPT | Mod: CPTII,,, | Performed by: INTERNAL MEDICINE

## 2023-07-10 PROCEDURE — 99214 OFFICE O/P EST MOD 30 MIN: CPT | Mod: PBBFAC | Performed by: INTERNAL MEDICINE

## 2023-07-10 PROCEDURE — 99214 OFFICE O/P EST MOD 30 MIN: CPT | Mod: S$PBB,,, | Performed by: INTERNAL MEDICINE

## 2023-07-10 PROCEDURE — 3072F LOW RISK FOR RETINOPATHY: CPT | Mod: CPTII,,, | Performed by: INTERNAL MEDICINE

## 2023-07-10 PROCEDURE — 99999 PR PBB SHADOW E&M-EST. PATIENT-LVL IV: CPT | Mod: PBBFAC,,, | Performed by: INTERNAL MEDICINE

## 2023-07-10 PROCEDURE — 3044F PR MOST RECENT HEMOGLOBIN A1C LEVEL <7.0%: ICD-10-PCS | Mod: CPTII,,, | Performed by: INTERNAL MEDICINE

## 2023-07-10 PROCEDURE — 3075F SYST BP GE 130 - 139MM HG: CPT | Mod: CPTII,,, | Performed by: INTERNAL MEDICINE

## 2023-07-10 PROCEDURE — 99214 PR OFFICE/OUTPT VISIT, EST, LEVL IV, 30-39 MIN: ICD-10-PCS | Mod: S$PBB,,, | Performed by: INTERNAL MEDICINE

## 2023-07-10 RX ORDER — CARVEDILOL 6.25 MG/1
6.25 TABLET ORAL 2 TIMES DAILY WITH MEALS
Qty: 60 TABLET | Refills: 11 | Status: SHIPPED | OUTPATIENT
Start: 2023-07-10 | End: 2023-07-19

## 2023-07-10 NOTE — PROGRESS NOTES
"Subjective:   Patient ID:  Yolanda Betts is a 53 y.o. female who presents for follow up of Chest Pain, Dizziness, Shortness of Breath, Heartburn, and Palpitations    4/22/2020  A 48 yo female with htn diabetes obesity o2 desaturation nocturnal o2 therapy migraine asthma is following on htn. Her bp is improved she is more complaint with slat diabetes and meds intake. The dose adjustment has helped with the bp buy taking extra 5 mg of amlodipine.  Has uti and had shortness of breath necessitating an er visits. Feels much better. Has  Less  shortness of breath she got hydrated. Has no leg edema her nebs has helped control symptoms has no other issues clinically.htn better control no fluctuation hr improved.      1/6/2021  Here for preop clearance for carpal tunnel. She is asymptomatic cardiac wise she works as  has no complaints of chest pain or shortness of breath had gallbladder surgery last year no complication has been dropping items from hand  Has numbness and tingling she needs bilateral carpal tunnel done . She is also looking at having bariatric surgery.      3/11/2021 YVES HUYNH  Ms. Betts is a 50 year old female patient whose current medical conditions include HTN, obesity, hypertriglyceridemia, DM type II, obesity, hypoventilation syndrome on nocturnal O2, and schizoaffective disorder who presents today for routine follow-up. She returns today and states she is doing clinicalli.y well. Main issue is still bilateral hand pain/tingling/nubmness. Scheduled for CTR in April, previously cleared by Dr. Mccormick. No change in status since last visit. No chest pain, heaviness, or tightness. No symptoms concerning for angina. No lightheadedness, dizziness, near syncope, or syncope. Does admit to occasional palpitations, notices it when she is stressed or anxious, not especially bothersome. No s/s suggestive of CHF. Does report getting winded if pollen is "bad outside". BP well-controlled. " Patient is compliant with her medications. Still works as an . Needs updated labs, orders placed.     9/15/2021   FEELS WELL COMPLIANT WITH MEDS DIET REVIEW OF DIGITAL; MEDICINE SHOWED GOOD CONTROL BP DIABETES. . MTZ SNO LEG SWELLING FORGETS TO USE O2 THERAPY CONTINOUSLY. HAS NO TIA CLAUDICVATION PALPITATIONS. HAS FLUCTUATION IN WEIGHT TRIES TO BE COMPLIANT TO LOSE WEIGHT.     3/16/2022  Here for f/u has had a sleeve  On adkin's diet has been losing weight has been exercising . Has been having swimming sensation in her head when lying down moving head or walking on treadmill. She has narcolepsy getting her sleep eval done this month she is on o2 therapy at [facility]. Has no other complaints her lipids are improved remarkably.     6/30/2022  Comes in for follow up   BP was elevated, meds adjusted by PCP   Feels well now , and BP controlled  LDL not at goal given her DM   Stable on follow up     1.5.2023  Comes in for a 6 months follow up   Doing mostly well   She denies any chest pain, no ivory, no orthopnea  She h/o anxiety, stressed out at work   She has bilateral ankle swelling , worse in the evening, she does a lot of sitting, she also takes amlodipine     7.10.2023  Comes in for a 6 months follow up   States bp also elevated at time at home   Was released from her job, states for mental illness and she is in the process of filing for disability   She is compliant with meds and diet   Mild ankles swelling in PM     Past Medical History:   Diagnosis Date    Abnormal Pap smear of cervix     HPV genital warts    Anemia     Anxiety     Arthritis     Asthma 10/11/2016    Bipolar 1 disorder     Diabetes mellitus, type 2     Dyslipidemia associated with type 2 diabetes mellitus 5/13/2019    General anesthetics causing adverse effect in therapeutic use     Genital warts     GERD (gastroesophageal reflux disease)     Herpes simplex virus (HSV) infection     Hyperlipidemia     Hypertension     Hypertension  complicating diabetes 2019    Migraine with aura and without status migrainosus, not intractable 3/21/2016    Mild persistent asthma without complication 10/11/2016    Morbid obesity with body mass index (BMI) of 45.0 to 49.9 in adult 8/3/2017    Obstructive sleep apnea     ALEN (obstructive sleep apnea)     2L per N/C q HS    Schizoaffective disorder, bipolar type 2019    Seasonal allergic rhinitis due to pollen 2019    Type 2 diabetes mellitus with hyperglycemia, with long-term current use of insulin 2017    Type 2 diabetes mellitus with hyperglycemia, without long-term current use of insulin 2017       Past Surgical History:   Procedure Laterality Date    abcess removed right back      BELT ABDOMINOPLASTY      BREAST SURGERY Bilateral 1996    Reduction     SECTION      X 2    COLONOSCOPY      COLONOSCOPY N/A 2018    Procedure: colonoscopy for iron deficiency anemia;  Surgeon: Frankie Sanchez MD;  Location: [facility];  Service: Endoscopy;  Laterality: N/A;    COLONOSCOPY N/A 2018    Procedure: COLONOSCOPY;  Surgeon: Frankie Sanchez MD;  Location: [facility];  Service: Endoscopy;  Laterality: N/A;    COLONOSCOPY N/A 3/10/2023    Procedure: COLONOSCOPY;  Surgeon: Lalita Montes De Oca MD;  Location: [facility];  Service: Endoscopy;  Laterality: N/A;    EPIDURAL STEROID INJECTION INTO CERVICAL SPINE N/A 2023    Procedure: C6/7 IL ROCAEL RN IV Sedation;  Surgeon: Otto Phillips MD;  Location: [facility];  Service: Pain Management;  Laterality: N/A;    ESOPHAGOGASTRODUODENOSCOPY N/A 3/10/2023    Procedure: EGD (ESOPHAGOGASTRODUODENOSCOPY);  Surgeon: Lalita Montes De Oca MD;  Location: [facility];  Service: Endoscopy;  Laterality: N/A;    HYSTERECTOMY      w/ BSO; hypermenorrhea    MOUTH SURGERY      OOPHORECTOMY      hyst/bso, hypermenorrhea    ROBOT-ASSISTED CHOLECYSTECTOMY USING DA DARI XI N/A 1/3/2020    Procedure: XI ROBOTIC CHOLECYSTECTOMY;  Surgeon:  "Damir Driscoll MD;  Location: [facility];  Service: General;  Laterality: N/A;    TOTAL REDUCTION MAMMOPLASTY      TUBAL LIGATION         Social History     Tobacco Use    Smoking status: Never    Smokeless tobacco: Never   Substance Use Topics    Alcohol use: Yes     Alcohol/week: 0.0 standard drinks     Comment: socially  No alcohol 72h prior to sx    Drug use: No       Family History   Problem Relation Age of Onset    Diabetes Mother     Hyperlipidemia Mother     Hypertension Mother     Asthma Mother     COPD Mother     Glaucoma Mother     Thyroid disease Mother     Anesthesia problems Mother         "almost had a cardiac arrest" , blood clots    Hypertension Father     Hyperlipidemia Father     Cancer Father         Brain, lung, liver, kidney    Heart disease Maternal Grandmother     Hyperlipidemia Maternal Grandmother     Hypertension Maternal Grandmother     Cataracts Maternal Grandmother     Diabetes Maternal Grandmother     Heart disease Maternal Grandfather     Hyperlipidemia Maternal Grandfather     Hypertension Maternal Grandfather     Glaucoma Maternal Grandfather     Cancer Maternal Grandfather     Cataracts Maternal Grandfather     Macular degeneration Maternal Grandfather     Diabetes Maternal Grandfather     Heart disease Paternal Grandmother     Hyperlipidemia Paternal Grandmother     Hypertension Paternal Grandmother     Cataracts Paternal Grandmother     Heart disease Paternal Grandfather     Hyperlipidemia Paternal Grandfather     Hypertension Paternal Grandfather     Cataracts Paternal Grandfather     Breast cancer Maternal Cousin     Breast cancer Maternal Cousin     Breast cancer Maternal Cousin     Breast cancer Maternal Cousin        Current Outpatient Medications   Medication Sig    ALPRAZolam (XANAX XR) 2 MG Tb24 Take 1 tablet (2 mg total) by mouth once daily.    amlodipine-valsartan (EXFORGE)  mg per tablet Take 1 tablet by mouth once daily.    aspirin (ECOTRIN) 81 MG EC tablet Take " "1 tablet by mouth daily    atorvastatin (LIPITOR) 20 MG tablet Take 1 tablet (20 mg total) by mouth every evening.    BD INSULIN SYRINGE ULTRA-FINE 1/2 mL 30 gauge x 1/2" Syrg     blood sugar diagnostic (ONETOUCH ULTRA TEST) Strp 1 each by Misc.(Non-Drug; Combo Route) route 4 (four) times daily. eal Glucose Test Strips    blood sugar diagnostic (ONETOUCH ULTRA TEST) Strp Check blood glucose 4 times daily as directed and as needed (dispense insurance preferred brand or patient choice)    budesonide-formoterol 160-4.5 mcg (SYMBICORT) 160-4.5 mcg/actuation HFAA Inhale 2 puffs into the lungs every 12 (twelve) hours. Controller : Use spacer    butalbital-acetaminophen-caffeine -40 mg (FIORICET, ESGIC) -40 mg per tablet Take 1 tablet by mouth every 4 hours as needed for headache    cycloSPORINE (RESTASIS) 0.05 % ophthalmic emulsion Place 1 drop into both eyes twice daily    cycloSPORINE 0.05 % Drop Place 1 drop into both eyes 2 (two) times daily.    diclofenac sodium (VOLTAREN) 1 % Gel Apply topically to affected areas 4 times daily    DULoxetine (CYMBALTA) 60 MG capsule Take 1 capsule by mouth twice daily    ergocalciferol (ERGOCALCIFEROL) 50,000 unit Cap Take 1 capsule twice weekly for 3 weeks then weekly    estradioL (ESTRACE) 0.01 % (0.1 mg/gram) vaginal cream Place 1 g vaginally twice a week.    eszopiclone (LUNESTA) 2 MG Tab Take 1 tablet by mouth every night at bedtime    fenofibrate (TRICOR) 145 MG tablet Take 1 tablet (145 mg total) by mouth once daily.    flash glucose scanning reader (FREESTYLE AMY 2 READER) Misc Use as directed to check blood sugar    fluticasone propionate (FLONASE) 50 mcg/actuation nasal spray 2 sprays (100 mcg total) by Each Nostril route once daily.    frovatriptan (FROVA) 2.5 MG tablet Take 1 tablet at onset of acute migraine. May take 1 more tablet 2 hours later if needed. (MAX 2 TABLET PER DAY. MAX 4 TABLETS PER WEEK.)    furosemide (LASIX) 20 MG tablet Take 1 tablet " "(20 mg total) by mouth once daily.    HEALTHYLAX 17 gram PwPk Take 17 g by mouth 2 (two) times daily.    lamoTRIgine (LAMICTAL) 150 MG Tab Take 1 tablet (150 mg total) by mouth every morning.    lancets (ONETOUCH DELICA PLUS LANCET) 30 gauge Misc Use as directed 3 times daily    levocetirizine (XYZAL) 5 MG tablet Take 1 tablet (5 mg total) by mouth every evening.    LIDOcaine-prilocaine (EMLA) cream Apply topically up to 4 times per day to affected area for pain relief    linaCLOtide (LINZESS) 290 mcg Cap capsule Take 1 capsule (290 mcg total) by mouth before breakfast.    lurasidone (LATUDA) 120 mg Tab Take 1 tablet by mouth every night before bedtime    magnesium oxide (MAG-OX) 400 mg (241.3 mg magnesium) tablet Take 1 tablet (400 mg total) by mouth once daily.    metFORMIN (GLUCOPHAGE) 1000 MG tablet Take 1 tablet (1,000 mg total) by mouth 2 (two) times daily with meals.    metFORMIN (GLUCOPHAGE) 1000 MG tablet Take 1 tablet (1,000 mg total) by mouth 2 (two) times daily with meals.    naproxen (NAPROSYN) 500 MG tablet Take 1 tablet (500 mg total) by mouth 2 (two) times daily.    omeprazole (PRILOSEC) 40 MG capsule Take 1 capsule (40 mg total) by mouth once daily.    pen needle, diabetic (BD ULTRA-FINE MINI PEN NEEDLE) 31 gauge x 3/16" Ndle Use one needle 5 times a day    pen needle, diabetic (BD ULTRA-FINE MINI PEN NEEDLE) 31 gauge x 3/16" Ndle Use 5 a day    pneumoc 20-tatiana conj-dip cr,PF, (PREVNAR-20, PF,) 0.5 mL Syrg injection Inject 0.5 mLs into the muscle.    promethazine (PHENERGAN) 12.5 MG Tab     prucalopride (MOTEGRITY) 2 mg Tab Take 1 tablet (2 mg) by mouth once daily.    secukinumab (COSENTYX PEN) 150 mg/mL PnIj Inject 300 mg into the skin every 7 days.    secukinumab (COSENTYX PEN) 150 mg/mL PnIj Inject 300 mg into the skin every 28 days.    semaglutide (OZEMPIC) 2 mg/dose (8 mg/3 mL) PnElis Inject 2 mg into the skin every 7 days.    spironolactone (ALDACTONE) 25 MG tablet Take 1 tablet (25 mg total) by " "mouth once daily.    tiZANidine (ZANAFLEX) 4 MG tablet Take 2 tablets by mouth every 6 hours as needed    TOUJEO MAX U-300 SOLOSTAR 300 unit/mL (3 mL) insulin pen Inject 84 Units into the skin once daily.    carvediloL (COREG) 6.25 MG tablet Take 1 tablet (6.25 mg total) by mouth 2 (two) times daily with meals.     No current facility-administered medications for this visit.     Current Outpatient Medications on File Prior to Visit   Medication Sig    ALPRAZolam (XANAX XR) 2 MG Tb24 Take 1 tablet (2 mg total) by mouth once daily.    amlodipine-valsartan (EXFORGE)  mg per tablet Take 1 tablet by mouth once daily.    aspirin (ECOTRIN) 81 MG EC tablet Take 1 tablet by mouth daily    atorvastatin (LIPITOR) 20 MG tablet Take 1 tablet (20 mg total) by mouth every evening.    BD INSULIN SYRINGE ULTRA-FINE 1/2 mL 30 gauge x 1/2" Syrg     blood sugar diagnostic (ONETOUCH ULTRA TEST) Strp 1 each by Misc.(Non-Drug; Combo Route) route 4 (four) times daily. [facility] Glucose Test Strips    blood sugar diagnostic (ONETOUCH ULTRA TEST) Strp Check blood glucose 4 times daily as directed and as needed (dispense insurance preferred brand or patient choice)    budesonide-formoterol 160-4.5 mcg (SYMBICORT) 160-4.5 mcg/actuation HFAA Inhale 2 puffs into the lungs every 12 (twelve) hours. Controller : Use spacer    butalbital-acetaminophen-caffeine -40 mg (FIORICET, ESGIC) -40 mg per tablet Take 1 tablet by mouth every 4 hours as needed for headache    cycloSPORINE (RESTASIS) 0.05 % ophthalmic emulsion Place 1 drop into both eyes twice daily    cycloSPORINE 0.05 % Drop Place 1 drop into both eyes 2 (two) times daily.    diclofenac sodium (VOLTAREN) 1 % Gel Apply topically to affected areas 4 times daily    DULoxetine (CYMBALTA) 60 MG capsule Take 1 capsule by mouth twice daily    ergocalciferol (ERGOCALCIFEROL) 50,000 unit Cap Take 1 capsule twice weekly for 3 weeks then weekly    estradioL (ESTRACE) 0.01 % (0.1 mg/gram) " "vaginal cream Place 1 g vaginally twice a week.    eszopiclone (LUNESTA) 2 MG Tab Take 1 tablet by mouth every night at bedtime    fenofibrate (TRICOR) 145 MG tablet Take 1 tablet (145 mg total) by mouth once daily.    flash glucose scanning reader (EncoverYLE AMY 2 READER) Misc Use as directed to check blood sugar    fluticasone propionate (FLONASE) 50 mcg/actuation nasal spray 2 sprays (100 mcg total) by Each Nostril route once daily.    frovatriptan (FROVA) 2.5 MG tablet Take 1 tablet at onset of acute migraine. May take 1 more tablet 2 hours later if needed. (MAX 2 TABLET PER DAY. MAX 4 TABLETS PER WEEK.)    furosemide (LASIX) 20 MG tablet Take 1 tablet (20 mg total) by mouth once daily.    HEALTHYLAX 17 gram PwPk Take 17 g by mouth 2 (two) times daily.    lamoTRIgine (LAMICTAL) 150 MG Tab Take 1 tablet (150 mg total) by mouth every morning.    lancets (ONETOUCH DELICA PLUS LANCET) 30 gauge Misc Use as directed 3 times daily    levocetirizine (XYZAL) 5 MG tablet Take 1 tablet (5 mg total) by mouth every evening.    LIDOcaine-prilocaine (EMLA) cream Apply topically up to 4 times per day to affected area for pain relief    linaCLOtide (LINZESS) 290 mcg Cap capsule Take 1 capsule (290 mcg total) by mouth before breakfast.    lurasidone (LATUDA) 120 mg Tab Take 1 tablet by mouth every night before bedtime    magnesium oxide (MAG-OX) 400 mg (241.3 mg magnesium) tablet Take 1 tablet (400 mg total) by mouth once daily.    metFORMIN (GLUCOPHAGE) 1000 MG tablet Take 1 tablet (1,000 mg total) by mouth 2 (two) times daily with meals.    metFORMIN (GLUCOPHAGE) 1000 MG tablet Take 1 tablet (1,000 mg total) by mouth 2 (two) times daily with meals.    naproxen (NAPROSYN) 500 MG tablet Take 1 tablet (500 mg total) by mouth 2 (two) times daily.    omeprazole (PRILOSEC) 40 MG capsule Take 1 capsule (40 mg total) by mouth once daily.    pen needle, diabetic (BD ULTRA-FINE MINI PEN NEEDLE) 31 gauge x 3/16" Ndle Use one needle 5 " "times a day    pen needle, diabetic (BD ULTRA-FINE MINI PEN NEEDLE) 31 gauge x 3/16" Ndle Use 5 a day    pneumoc 20-tatiana conj-dip cr,PF, (PREVNAR-20, PF,) 0.5 mL Syrg injection Inject 0.5 mLs into the muscle.    promethazine (PHENERGAN) 12.5 MG Tab     prucalopride (MOTEGRITY) 2 mg Tab Take 1 tablet (2 mg) by mouth once daily.    secukinumab (COSENTYX PEN) 150 mg/mL PnIj Inject 300 mg into the skin every 7 days.    secukinumab (COSENTYX PEN) 150 mg/mL PnIj Inject 300 mg into the skin every 28 days.    semaglutide (OZEMPIC) 2 mg/dose (8 mg/3 mL) PnIj Inject 2 mg into the skin every 7 days.    spironolactone (ALDACTONE) 25 MG tablet Take 1 tablet (25 mg total) by mouth once daily.    tiZANidine (ZANAFLEX) 4 MG tablet Take 2 tablets by mouth every 6 hours as needed    TOUJEO MAX U-300 SOLOSTAR 300 unit/mL (3 mL) insulin pen Inject 84 Units into the skin once daily.    [DISCONTINUED] metoprolol succinate (TOPROL-XL) 50 MG 24 hr tablet TAKE 1 TABLET(50 MG) BY MOUTH EVERY EVENING    [DISCONTINUED] clonazePAM (KLONOPIN) 1 MG tablet Take 1 tablet by mouth daily    [DISCONTINUED] glucagon, human recombinant, (GLUCAGON EMERGENCY KIT, HUMAN,) 1 mg SolR Inject 1 mg into the muscle as needed. Emergency use    [DISCONTINUED] valsartan (DIOVAN) 320 MG tablet Take 1 tablet (320 mg total) by mouth once daily.    [DISCONTINUED] zolpidem (AMBIEN) 5 MG Tab Take 1/2 tablet (2.5 mg) by mouth at bedtime     No current facility-administered medications on file prior to visit.     Review of patient's allergies indicates:   Allergen Reactions    Codeine Itching    Hydromorphone Other (See Comments)     Can't wake up for long time if taken  Slow to wake up after surgery after receiving    Aleve [naproxen sodium]     Lyrica [pregabalin] Itching    Motrin [ibuprofen]     Neuromuscular blockers, steroidal Hives     some    Latex, natural rubber Rash    Morphine Rash     itching    Norco [hydrocodone-acetaminophen] Itching, Rash and Hallucinations " "   Seconal [secobarbital sodium] Rash     itching    Tylox [oxycodone-acetaminophen] Rash       Review of Systems   Cardiovascular:  Negative for syncope.   Genitourinary: Negative.    Neurological: Negative.      Objective:   Physical Exam  Vitals reviewed.   Constitutional:       Appearance: She is well-developed.   Neck:      Vascular: No carotid bruit.   Cardiovascular:      Rate and Rhythm: Normal rate and regular rhythm.      Pulses: Intact distal pulses.      Heart sounds: Normal heart sounds. No murmur heard.  Pulmonary:      Breath sounds: Normal breath sounds.   Neurological:      Mental Status: She is oriented to person, place, and time.     Vitals:    07/10/23 0755   BP: 131/88   BP Location: Left arm   Patient Position: Sitting   Pulse: 88   Weight: 77.8 kg (171 lb 8.3 oz)   Height: 4' 8" (1.422 m)     Lab Results   Component Value Date    CHOL 131 09/21/2022    CHOL 187 06/24/2022    CHOL 174 03/08/2022     Lab Results   Component Value Date    HDL 38 (L) 09/21/2022    HDL 38 (L) 06/24/2022    HDL 57 03/08/2022     Lab Results   Component Value Date    LDLCALC 43.8 (L) 09/21/2022    LDLCALC 101.8 06/24/2022    LDLCALC 98.2 03/08/2022     Lab Results   Component Value Date    TRIG 246 (H) 09/21/2022    TRIG 236 (H) 06/24/2022    TRIG 94 03/08/2022     Lab Results   Component Value Date    CHOLHDL 29.0 09/21/2022    CHOLHDL 20.3 06/24/2022    CHOLHDL 32.8 03/08/2022       Chemistry        Component Value Date/Time     04/11/2023 1402    K 4.2 04/11/2023 1402     04/11/2023 1402    CO2 28 04/11/2023 1402    BUN 13 04/11/2023 1402    CREATININE 1.1 04/11/2023 1402    GLU 98 04/11/2023 1402        Component Value Date/Time    CALCIUM 10.3 04/11/2023 1402    ALKPHOS 64 04/11/2023 1402    AST 11 04/11/2023 1402    ALT 14 04/11/2023 1402    BILITOT 0.2 04/11/2023 1402    ESTGFRAFRICA >60.0 03/08/2022 0735    EGFRNONAA >60.0 03/08/2022 0735          Lab Results   Component Value Date    TSH 2.628 " 05/20/2021     Lab Results   Component Value Date    INR 1.0 06/26/2020    INR 0.9 11/23/2016     Lab Results   Component Value Date    WBC 9.06 01/13/2023    HGB 10.8 (L) 01/13/2023    HCT 35.5 (L) 01/13/2023    MCV 85 01/13/2023     01/13/2023     BMP  Sodium   Date Value Ref Range Status   04/11/2023 139 136 - 145 mmol/L Final     Potassium   Date Value Ref Range Status   04/11/2023 4.2 3.5 - 5.1 mmol/L Final     Chloride   Date Value Ref Range Status   04/11/2023 101 95 - 110 mmol/L Final     CO2   Date Value Ref Range Status   04/11/2023 28 23 - 29 mmol/L Final     BUN   Date Value Ref Range Status   04/11/2023 13 6 - 20 mg/dL Final     Creatinine   Date Value Ref Range Status   04/11/2023 1.1 0.5 - 1.4 mg/dL Final     Calcium   Date Value Ref Range Status   04/11/2023 10.3 8.7 - 10.5 mg/dL Final     Anion Gap   Date Value Ref Range Status   04/11/2023 10 8 - 16 mmol/L Final     eGFR if    Date Value Ref Range Status   03/08/2022 >60.0 >60 mL/min/1.73 m^2 Final     eGFR if non    Date Value Ref Range Status   03/08/2022 >60.0 >60 mL/min/1.73 m^2 Final     Comment:     Calculation used to obtain the estimated glomerular filtration  rate (eGFR) is the CKD-EPI equation.        CrCl cannot be calculated (Patient's most recent lab result is older than the maximum 7 days allowed.).  Lab Results   Component Value Date    HGBA1C 6.3 (H) 01/13/2023       Assessment:   Essential hypertension  -     carvediloL (COREG) 6.25 MG tablet; Take 1 tablet (6.25 mg total) by mouth 2 (two) times daily with meals.  Dispense: 60 tablet; Refill: 11    Hypertriglyceridemia  -     Lipid Panel; Future; Expected date: 08/08/2023    Bipolar 1 disorder    Diabetic polyneuropathy associated with type 2 diabetes mellitus    Schizoaffective disorder, bipolar type    Generalized anxiety disorder    Hypertension complicating diabetes          Plan:   BP NOT at goal , will switch toprol to coreg  Cardiac low  salt diet.  Risk factor modification and excercise program.  Reviewed current meds as above  LDL at goal  Compression stockings   Reviewed all tests and above medical conditions with patient in detail and formulated treatment plan.  Risk factor modification discussed.   Cardiac low salt diet discussed.    RTC with in 6months

## 2023-07-11 ENCOUNTER — TELEPHONE (OUTPATIENT)
Dept: RHEUMATOLOGY | Facility: CLINIC | Age: 53
End: 2023-07-11
Payer: MEDICAID

## 2023-07-11 ENCOUNTER — PATIENT MESSAGE (OUTPATIENT)
Dept: RHEUMATOLOGY | Facility: CLINIC | Age: 53
End: 2023-07-11
Payer: MEDICAID

## 2023-07-11 DIAGNOSIS — M47.819 SPONDYLOARTHROPATHY: Primary | ICD-10-CM

## 2023-07-11 RX ORDER — ADALIMUMAB 40MG/0.4ML
40 KIT SUBCUTANEOUS
Qty: 2 PEN | Refills: 2 | Status: ACTIVE | OUTPATIENT
Start: 2023-07-11 | End: 2023-08-22

## 2023-07-11 NOTE — TELEPHONE ENCOUNTER
Insurance requires you to try and fail humira before I can order Cosentyx.  Attached is the drug info for humira.  Biggest risks to the medication include increased risk of infection as well as malignancy.  Additionally, it can cause drop in blood counts elevations in liver enzymes.  Can worsen congestive heart failure in patients.  Some pts also experience neurologic sxs, including demyelinating disorders.  If you have any adverse effects, pls discontinue the medication and notify the office.     Mohini Wyman PA-C  [facility]  [facility]

## 2023-07-11 NOTE — TELEPHONE ENCOUNTER
----- Message from Yudy Escobedo sent at 7/11/2023 10:22 AM CDT -----  Contact: Hillary/Shereen Thornton with [employer] is calling to speak with someone regarding prior authorization. Request to follow-up on a appeal for prior authorization of secukinumab (COSENTYX PEN) 150 mg/mL PnIj. Provided reference key is [id number]. Please give [employer] a call back at [phone] when possible.  Thank you,  [facility]

## 2023-07-12 ENCOUNTER — OFFICE VISIT (OUTPATIENT)
Dept: PRIMARY CARE CLINIC | Facility: CLINIC | Age: 53
End: 2023-07-12
Payer: MEDICAID

## 2023-07-12 DIAGNOSIS — E78.2 MIXED HYPERLIPIDEMIA: ICD-10-CM

## 2023-07-12 DIAGNOSIS — F33.1 MODERATE EPISODE OF RECURRENT MAJOR DEPRESSIVE DISORDER: Primary | ICD-10-CM

## 2023-07-12 DIAGNOSIS — E11.65 TYPE 2 DIABETES MELLITUS WITH HYPERGLYCEMIA, WITH LONG-TERM CURRENT USE OF INSULIN: ICD-10-CM

## 2023-07-12 DIAGNOSIS — I10 ESSENTIAL HYPERTENSION: ICD-10-CM

## 2023-07-12 DIAGNOSIS — Z79.4 TYPE 2 DIABETES MELLITUS WITH HYPERGLYCEMIA, WITH LONG-TERM CURRENT USE OF INSULIN: ICD-10-CM

## 2023-07-12 PROCEDURE — 4010F ACE/ARB THERAPY RXD/TAKEN: CPT | Mod: CPTII,95,, | Performed by: FAMILY MEDICINE

## 2023-07-12 PROCEDURE — 99214 OFFICE O/P EST MOD 30 MIN: CPT | Mod: 95,,, | Performed by: FAMILY MEDICINE

## 2023-07-12 PROCEDURE — 4010F PR ACE/ARB THEARPY RXD/TAKEN: ICD-10-PCS | Mod: CPTII,95,, | Performed by: FAMILY MEDICINE

## 2023-07-12 PROCEDURE — 3044F HG A1C LEVEL LT 7.0%: CPT | Mod: CPTII,95,, | Performed by: FAMILY MEDICINE

## 2023-07-12 PROCEDURE — 99214 PR OFFICE/OUTPT VISIT, EST, LEVL IV, 30-39 MIN: ICD-10-PCS | Mod: 95,,, | Performed by: FAMILY MEDICINE

## 2023-07-12 PROCEDURE — 3072F LOW RISK FOR RETINOPATHY: CPT | Mod: CPTII,95,, | Performed by: FAMILY MEDICINE

## 2023-07-12 PROCEDURE — 3044F PR MOST RECENT HEMOGLOBIN A1C LEVEL <7.0%: ICD-10-PCS | Mod: CPTII,95,, | Performed by: FAMILY MEDICINE

## 2023-07-12 PROCEDURE — 3072F PR LOW RISK FOR RETINOPATHY: ICD-10-PCS | Mod: CPTII,95,, | Performed by: FAMILY MEDICINE

## 2023-07-12 RX ORDER — FROVATRIPTAN SUCCINATE 2.5 MG/1
TABLET, FILM COATED ORAL
Qty: 9 TABLET | Refills: 1 | Status: SHIPPED | OUTPATIENT
Start: 2023-07-12

## 2023-07-12 NOTE — PROGRESS NOTES
The patient location is: [place] at home  Visit type: Virtual visit with synchronous audio and video  Total time spent with patient:30 mins  This includes face to face time and non-face to face time preparing to see the patient (eg, review of tests), obtaining and/or reviewing separately obtained history, documenting clinical information in the electronic or other health record, independently interpreting results and communicating results to the patient/family/caregiver, or care coordinator.   Each patient to whom he or she provides medical services by telemedicine is:  (1) informed of the relationship between the physician and patient and the respective role of any other health care provider with respect to management of the patient; and (2) notified that he or she may decline to receive medical services by telemedicine and may withdraw from such care at any time.    Subjective:       Patient ID: Yolanda Betts is a 53 y.o. female.    Chief Complaint: Follow-up, Depression, Diabetes Mellitus, and Hypertension      History of Present Illness:   Yolanda Betts 53 y.o. female presents today with   Depression: in partial remission. She is continuing therapy and medication. Reports that she has underlying depression but it is not as painful or limiting her as before. She plans to start exercising soon. Denies SI/HI.  DM is controlled and A1c is due form tomorrow.  HTN: controlled.  HLD:   The 10-year ASCVD risk score (Samuel CUEVAS, et al., 2019) is: 10.3%    Values used to calculate the score:      Age: 53 years      Sex: Female      Is Non- : Yes      Diabetic: Yes      Tobacco smoker: No      Systolic Blood Pressure: 131 mmHg      Is BP treated: Yes      HDL Cholesterol: 38 mg/dL      Total Cholesterol: 131 mg/dL     Past Medical History:   Diagnosis Date    Abnormal Pap smear of cervix     HPV genital warts    Anemia     Anxiety     Arthritis     Asthma 10/11/2016    Bipolar 1  "disorder     Diabetes mellitus, type 2     Dyslipidemia associated with type 2 diabetes mellitus 5/13/2019    General anesthetics causing adverse effect in therapeutic use     Genital warts     GERD (gastroesophageal reflux disease)     Herpes simplex virus (HSV) infection     Hyperlipidemia     Hypertension     Hypertension complicating diabetes 5/5/2019    Migraine with aura and without status migrainosus, not intractable 3/21/2016    Mild persistent asthma without complication 10/11/2016    Morbid obesity with body mass index (BMI) of 45.0 to 49.9 in adult 8/3/2017    Obstructive sleep apnea     ALEN (obstructive sleep apnea)     2L per N/C q HS    Schizoaffective disorder, bipolar type 4/25/2019    Seasonal allergic rhinitis due to pollen 5/13/2019    Type 2 diabetes mellitus with hyperglycemia, with long-term current use of insulin 12/21/2017    Type 2 diabetes mellitus with hyperglycemia, without long-term current use of insulin 12/21/2017     Family History   Problem Relation Age of Onset    Diabetes Mother     Hyperlipidemia Mother     Hypertension Mother     Asthma Mother     COPD Mother     Glaucoma Mother     Thyroid disease Mother     Anesthesia problems Mother         "almost had a cardiac arrest" , blood clots    Hypertension Father     Hyperlipidemia Father     Cancer Father         Brain, lung, liver, kidney    Heart disease Maternal Grandmother     Hyperlipidemia Maternal Grandmother     Hypertension Maternal Grandmother     Cataracts Maternal Grandmother     Diabetes Maternal Grandmother     Heart disease Maternal Grandfather     Hyperlipidemia Maternal Grandfather     Hypertension Maternal Grandfather     Glaucoma Maternal Grandfather     Cancer Maternal Grandfather     Cataracts Maternal Grandfather     Macular degeneration Maternal Grandfather     Diabetes Maternal Grandfather     Heart disease Paternal Grandmother     Hyperlipidemia Paternal Grandmother     Hypertension Paternal Grandmother     " Cataracts Paternal Grandmother     Heart disease Paternal Grandfather     Hyperlipidemia Paternal Grandfather     Hypertension Paternal Grandfather     Cataracts Paternal Grandfather     Breast cancer Maternal Cousin     Breast cancer Maternal Cousin     Breast cancer Maternal Cousin     Breast cancer Maternal Cousin      Social History     Socioeconomic History    Marital status: Single   Tobacco Use    Smoking status: Never    Smokeless tobacco: Never   Substance and Sexual Activity    Alcohol use: Yes     Alcohol/week: 0.0 standard drinks     Comment: socially  No alcohol 72h prior to sx    Drug use: No    Sexual activity: Yes     Partners: Male     Birth control/protection: Surgical     Comment: hyst   Social History Narrative    Long-term care nurse     Social Determinants of Health     Financial Resource Strain: High Risk    Difficulty of Paying Living Expenses: Very hard   Food Insecurity: Food Insecurity Present    Worried About Running Out of Food in the Last Year: Sometimes true    Ran Out of Food in the Last Year: Sometimes true   Transportation Needs: No Transportation Needs    Lack of Transportation (Medical): No    Lack of Transportation (Non-Medical): No   Physical Activity: Inactive    Days of Exercise per Week: 0 days    Minutes of Exercise per Session: 0 min   Stress: Stress Concern Present    Feeling of Stress : Very much   Social Connections: Moderately Isolated    Frequency of Communication with Friends and Family: More than three times a week    Frequency of Social Gatherings with Friends and Family: More than three times a week    Attends Buddhism Services: More than 4 times per year    Active Member of Clubs or Organizations: No    Attends Club or Organization Meetings: Never    Marital Status:    Housing Stability: High Risk    Unable to Pay for Housing in the Last Year: Yes    Number of Places Lived in the Last Year: 1    Unstable Housing in the Last Year: No     Outpatient  "Encounter Medications as of 7/12/2023   Medication Sig Dispense Refill    adalimumab (HUMIRA,CF, PEN) 40 mg/0.4 mL PnKt Inject 0.4 mLs (40 mg total) into the skin every 14 (fourteen) days. 2 pen 2    ALPRAZolam (XANAX XR) 2 MG Tb24 Take 1 tablet (2 mg total) by mouth once daily. 30 tablet 2    amlodipine-valsartan (EXFORGE)  mg per tablet Take 1 tablet by mouth once daily. 90 tablet 3    aspirin (ECOTRIN) 81 MG EC tablet Take 1 tablet by mouth daily 30 tablet 0    atorvastatin (LIPITOR) 20 MG tablet Take 1 tablet (20 mg total) by mouth every evening. 90 tablet 3    BD INSULIN SYRINGE ULTRA-FINE 1/2 mL 30 gauge x 1/2" Syrg   0    blood sugar diagnostic (ONETOUCH ULTRA TEST) Strp 1 each by Misc.(Non-Drug; Combo Route) route 4 (four) times daily. [facility] Glucose Test Strips 400 strip 3    blood sugar diagnostic (ONETOUCH ULTRA TEST) Strp Check blood glucose 4 times daily as directed and as needed (dispense insurance preferred brand or patient choice) 200 each 5    budesonide-formoterol 160-4.5 mcg (SYMBICORT) 160-4.5 mcg/actuation HFAA Inhale 2 puffs into the lungs every 12 (twelve) hours. Controller : Use spacer 10.2 g 6    butalbital-acetaminophen-caffeine -40 mg (FIORICET, ESGIC) -40 mg per tablet Take 1 tablet by mouth every 4 hours as needed for headache 30 tablet 3    carvediloL (COREG) 6.25 MG tablet Take 1 tablet (6.25 mg total) by mouth 2 (two) times daily with meals. 60 tablet 11    cycloSPORINE (RESTASIS) 0.05 % ophthalmic emulsion Place 1 drop into both eyes twice daily 60 each 0    cycloSPORINE 0.05 % Drop Place 1 drop into both eyes 2 (two) times daily. 5.5 mL 11    diclofenac sodium (VOLTAREN) 1 % Gel Apply topically to affected areas 4 times daily 100 g 0    DULoxetine (CYMBALTA) 60 MG capsule Take 1 capsule by mouth twice daily 60 capsule 2    ergocalciferol (ERGOCALCIFEROL) 50,000 unit Cap Take 1 capsule twice weekly for 3 weeks then weekly 12 capsule 3    estradioL (ESTRACE) 0.01 % " (0.1 mg/gram) vaginal cream Place 1 g vaginally twice a week. 42.5 g 2    eszopiclone (LUNESTA) 2 MG Tab Take 1 tablet by mouth every night at bedtime 30 tablet 2    fenofibrate (TRICOR) 145 MG tablet Take 1 tablet (145 mg total) by mouth once daily. 90 tablet 3    flash glucose scanning reader (FREESTYLE AMY 2 READER) Misc Use as directed to check blood sugar 1 each 1    fluticasone propionate (FLONASE) 50 mcg/actuation nasal spray 2 sprays (100 mcg total) by Each Nostril route once daily. 16 g 0    frovatriptan (FROVA) 2.5 MG tablet Take 1 tablet at onset of acute migraine. May take 1 more tablet 2 hours later if needed. (MAX 2 TABLET PER DAY. MAX 4 TABLETS PER WEEK.) 9 tablet 1    furosemide (LASIX) 20 MG tablet Take 1 tablet (20 mg total) by mouth once daily. 90 tablet 3    HEALTHYLAX 17 gram PwPk Take 17 g by mouth 2 (two) times daily.      lamoTRIgine (LAMICTAL) 150 MG Tab Take 1 tablet (150 mg total) by mouth every morning. 30 tablet 2    lancets (ONETOUCH DELICA PLUS LANCET) 30 gauge Misc Use as directed 3 times daily 100 each 11    levocetirizine (XYZAL) 5 MG tablet Take 1 tablet (5 mg total) by mouth every evening. 30 tablet 11    LIDOcaine-prilocaine (EMLA) cream Apply topically up to 4 times per day to affected area for pain relief 60 g 0    linaCLOtide (LINZESS) 290 mcg Cap capsule Take 1 capsule (290 mcg total) by mouth before breakfast. 30 capsule 5    lurasidone (LATUDA) 120 mg Tab Take 1 tablet by mouth every night before bedtime 30 tablet 2    magnesium oxide (MAG-OX) 400 mg (241.3 mg magnesium) tablet Take 1 tablet (400 mg total) by mouth once daily. 90 tablet 3    metFORMIN (GLUCOPHAGE) 1000 MG tablet Take 1 tablet (1,000 mg total) by mouth 2 (two) times daily with meals. 180 tablet 1    metFORMIN (GLUCOPHAGE) 1000 MG tablet Take 1 tablet (1,000 mg total) by mouth 2 (two) times daily with meals. 180 tablet 1    naproxen (NAPROSYN) 500 MG tablet Take 1 tablet (500 mg total) by mouth 2 (two)  "times daily. 60 tablet 1    omeprazole (PRILOSEC) 40 MG capsule Take 1 capsule (40 mg total) by mouth once daily. 30 capsule 11    pen needle, diabetic (BD ULTRA-FINE MINI PEN NEEDLE) 31 gauge x 3/16" Ndle Use one needle 5 times a day 200 each 11    pen needle, diabetic (BD ULTRA-FINE MINI PEN NEEDLE) 31 gauge x 3/16" Ndle Use 5 a day 200 each 3    pneumoc 20-tatiana conj-dip cr,PF, (PREVNAR-20, PF,) 0.5 mL Syrg injection Inject 0.5 mLs into the muscle. 0.5 mL 0    promethazine (PHENERGAN) 12.5 MG Tab       prucalopride (MOTEGRITY) 2 mg Tab Take 1 tablet (2 mg) by mouth once daily. 30 tablet 11    semaglutide (OZEMPIC) 2 mg/dose (8 mg/3 mL) PnIj Inject 2 mg into the skin every 7 days. 3 mL 5    spironolactone (ALDACTONE) 25 MG tablet Take 1 tablet (25 mg total) by mouth once daily. 90 tablet 3    tiZANidine (ZANAFLEX) 4 MG tablet Take 2 tablets by mouth every 6 hours as needed 240 tablet 0    TOUJEO MAX U-300 SOLOSTAR 300 unit/mL (3 mL) insulin pen Inject 84 Units into the skin once daily. 6 mL 2    [] varicella-zoster [id number], PF, (SHINGRIX, PF,) 50 mcg/0.5 mL injection Inject 0.5 mLs into the muscle once. for 1 dose 1 each 0    [DISCONTINUED] amLODIPine (NORVASC) 10 MG tablet Take 1 tablet by mouth once daily 30 tablet 0    [DISCONTINUED] blood sugar diagnostic (ONETOUCH ULTRA TEST) Strp Check blood glucose 4 times daily as directed and as needed (dispense insurance preferred brand or patient choice) 200 each 5    [DISCONTINUED] blood sugar diagnostic Strp 1 each by Misc.(Non-Drug; Combo Route) route 4 (four) times daily. [facility] Glucose Test Strips 400 strip 3    [DISCONTINUED] clonazePAM (KLONOPIN) 1 MG tablet Take 1 tablet by mouth daily 30 tablet 0    [DISCONTINUED] estradioL (ESTRACE) 0.01 % (0.1 mg/gram) vaginal cream Place 1 g vaginally once daily. 42.5 g 1    [DISCONTINUED] glucagon, human recombinant, (GLUCAGON EMERGENCY KIT, HUMAN,) 1 mg SolR Inject 1 mg into the muscle as needed. Emergency use 1 " "each 1    [DISCONTINUED] insulin glargine U-300 conc (TOUJEO MAX U-300 SOLOSTAR) 300 unit/mL (3 mL) insulin pen Inject 84 Units into the skin once daily. 6 mL 2    [DISCONTINUED] metFORMIN (GLUCOPHAGE) 1000 MG tablet TAKE 1 TABLET(1000 MG) BY MOUTH TWICE DAILY WITH MEALS 180 tablet 1    [DISCONTINUED] metoprolol succinate (TOPROL-XL) 50 MG 24 hr tablet Take 1 tablet (50 mg total) by mouth every evening. 90 tablet 3    [DISCONTINUED] metoprolol succinate (TOPROL-XL) 50 MG 24 hr tablet TAKE 1 TABLET(50 MG) BY MOUTH EVERY EVENING 90 tablet 3    [DISCONTINUED] pen needle, diabetic (BD ULTRA-FINE MINI PEN NEEDLE) 31 gauge x 3/16" Ndle Use 5 a day 200 each 3    [DISCONTINUED] secukinumab (COSENTYX PEN) 150 mg/mL PnIj Inject 300 mg into the skin every 7 days. 10 mL 0    [DISCONTINUED] secukinumab (COSENTYX PEN) 150 mg/mL PnIj Inject 300 mg into the skin every 28 days. 2 mL 2    [DISCONTINUED] secukinumab (COSENTYX PEN, 2 PENS,) 150 mg/mL PnIj Inject 300 mg into the skin every 7 days. For 5 doses, then q 4 weeks thereafter 5 each 0    [DISCONTINUED] secukinumab (COSENTYX PEN, 2 PENS,) 150 mg/mL PnIj Inject 300 mg into the skin every 28 days. 1 each 2    [DISCONTINUED] valsartan (DIOVAN) 320 MG tablet Take 1 tablet (320 mg total) by mouth once daily. 90 tablet 3    [DISCONTINUED] zolpidem (AMBIEN) 5 MG Tab Take 1/2 tablet (2.5 mg) by mouth at bedtime 15 tablet 0     No facility-administered encounter medications on file as of 7/12/2023.       Review of Systems   Constitutional:  Negative for activity change and unexpected weight change.   HENT:  Negative for hearing loss, rhinorrhea and trouble swallowing.    Eyes:  Negative for discharge and visual disturbance.   Respiratory:  Negative for chest tightness and wheezing.    Cardiovascular:  Negative for chest pain and palpitations.   Gastrointestinal:  Negative for blood in stool, constipation, diarrhea and vomiting.   Endocrine: Negative for polydipsia and polyuria. "   Genitourinary:  Negative for difficulty urinating, dysuria, hematuria and menstrual problem.   Musculoskeletal:  Negative for arthralgias, joint swelling and neck pain.   Neurological:  Negative for weakness and headaches.   Psychiatric/Behavioral:  Negative for confusion and dysphoric mood.      Objective:      There were no vitals taken for this visit.  Physical Exam    CONSTITUTIONAL: No apparent distress. Appears comfortable. Does not appear acutely ill or septic. Appears adequately hydrated.  CARDIOVASCULAR: No perioral cyanosis  PULMONARY: Breathing unlabored. No retractions Chest expansion grossly normal.  PSYCHIATRIC: Alert and conversant and grossly oriented. Mood is grossly neutral. Affect appropriate. Judgment and insight grossly intact.  NEUROLOGIC: No focal sensory deficits reported.   Results for orders placed or performed in visit on 06/27/23   C-Reactive Protein   Result Value Ref Range    CRP 21.9 (H) 0.0 - 8.2 mg/L   Hepatitis Panel, Acute   Result Value Ref Range    Hepatitis B Surface Ag Non-reactive Non-reactive    Hep B C IgM Non-reactive Non-reactive    Hep A IgM Non-reactive Non-reactive    Hepatitis C Ab Non-reactive Non-reactive   Quantiferon Gold TB   Result Value Ref Range    NIL 0.04209 IU/mL    TB1 - Nil 0.069 IU/mL    TB2 - Nil -0.126 IU/mL    Mitogen - Nil 9.740 IU/mL    TB Gold Plus Negative Negative     *Note: Due to a large number of results and/or encounters for the requested time period, some results have not been displayed. A complete set of results can be found in Results Review.     Assessment:       1. Moderate episode of recurrent major depressive disorder    2. Essential hypertension    3. Type 2 diabetes mellitus with hyperglycemia, with long-term current use of insulin    4. Mixed hyperlipidemia        Plan:   Moderate episode of recurrent major depressive disorder  Comments:  partial remission, cont meds and therapy.     Essential hypertension  Comments:  controlled no  change in med    Type 2 diabetes mellitus with hyperglycemia, with long-term current use of insulin  Comments:  controlled no change in med  Orders:  -     Hemoglobin A1C; Future; Expected date: 07/12/2023    Mixed hyperlipidemia  Comments:  controlled      I have reviewed all of the patient's clinical history available in care everywhere and Epic and have utilized this in my evaluation and management recommendations today.   Treatment options and alternatives were discussed with the patient. Patient was given ample time to ask questions. All questions were answered. Voices understanding and acceptance of this advice. Will call back if any further questions or concerns.  Sasha Sorenson MD

## 2023-07-13 ENCOUNTER — SPECIALTY PHARMACY (OUTPATIENT)
Dept: PHARMACY | Facility: CLINIC | Age: 53
End: 2023-07-13
Payer: MEDICAID

## 2023-07-14 ENCOUNTER — SPECIALTY PHARMACY (OUTPATIENT)
Dept: PHARMACY | Facility: CLINIC | Age: 53
End: 2023-07-14
Payer: MEDICAID

## 2023-07-14 DIAGNOSIS — M47.819 SPONDYLOARTHROPATHY: Primary | ICD-10-CM

## 2023-07-14 NOTE — TELEPHONE ENCOUNTER
"Specialty Pharmacy - Initial Clinical Assessment    Specialty Medication Orders Linked to Encounter      Flowsheet Row Most Recent Value   Medication #1 adalimumab (HUMIRA,CF, PEN) 40 mg/0.4 mL PnKt (Order#[id number], Rx#[id number])          Patient Diagnosis   Spondyloarthropathy M47.819    Subjective    Yolanda Betts is a 53 y.o. female, who is followed by the specialty pharmacy service for management and education.    Recent Encounters       Date Type Provider Description    07/14/2023 Specialty Pharmacy Tierra Galan, Loretta Initial Clinical Assessment    07/13/2023 Specialty Pharmacy Tierra Galan, Loretta Referral Authorization            Current Outpatient Medications   Medication Sig    adalimumab (HUMIRA,CF, PEN) 40 mg/0.4 mL PnKt Inject 0.4 mLs (40 mg total) into the skin every 14 (fourteen) days.    ALPRAZolam (XANAX XR) 2 MG Tb24 Take 1 tablet (2 mg total) by mouth once daily.    amlodipine-valsartan (EXFORGE)  mg per tablet Take 1 tablet by mouth once daily.    aspirin (ECOTRIN) 81 MG EC tablet Take 1 tablet by mouth daily    atorvastatin (LIPITOR) 20 MG tablet Take 1 tablet (20 mg total) by mouth every evening.    BD INSULIN SYRINGE ULTRA-FINE 1/2 mL 30 gauge x 1/2" Syrg     blood sugar diagnostic (ONETOUCH ULTRA TEST) Strp 1 each by Misc.(Non-Drug; Combo Route) route 4 (four) times daily. [facility] Glucose Test Strips    blood sugar diagnostic (ONETOUCH ULTRA TEST) Strp Check blood glucose 4 times daily as directed and as needed (dispense insurance preferred brand or patient choice)    budesonide-formoterol 160-4.5 mcg (SYMBICORT) 160-4.5 mcg/actuation HFAA Inhale 2 puffs into the lungs every 12 (twelve) hours. Controller : Use spacer    butalbital-acetaminophen-caffeine -40 mg (FIORICET, ESGIC) -40 mg per tablet Take 1 tablet by mouth every 4 hours as needed for headache    carvediloL (COREG) 6.25 MG tablet Take 1 tablet (6.25 mg total) by mouth 2 (two) times daily with " meals.    cycloSPORINE (RESTASIS) 0.05 % ophthalmic emulsion Place 1 drop into both eyes twice daily    cycloSPORINE 0.05 % Drop Place 1 drop into both eyes 2 (two) times daily.    diclofenac sodium (VOLTAREN) 1 % Gel Apply topically to affected areas 4 times daily    DULoxetine (CYMBALTA) 60 MG capsule Take 1 capsule by mouth twice daily    ergocalciferol (ERGOCALCIFEROL) 50,000 unit Cap Take 1 capsule twice weekly for 3 weeks then weekly    estradioL (ESTRACE) 0.01 % (0.1 mg/gram) vaginal cream Place 1 g vaginally twice a week.    eszopiclone (LUNESTA) 2 MG Tab Take 1 tablet by mouth every night at bedtime    fenofibrate (TRICOR) 145 MG tablet Take 1 tablet (145 mg total) by mouth once daily.    flash glucose scanning reader ([employer] AMY 2 READER) Misc Use as directed to check blood sugar    fluticasone propionate (FLONASE) 50 mcg/actuation nasal spray 2 sprays (100 mcg total) by Each Nostril route once daily.    frovatriptan (FROVA) 2.5 MG tablet Take 1 tablet at onset of acute migraine. May take 1 more tablet 2 hours later if needed. (MAX 2 TABLET PER DAY. MAX 4 TABLETS PER WEEK.)    furosemide (LASIX) 20 MG tablet Take 1 tablet (20 mg total) by mouth once daily.    HEALTHYLAX 17 gram PwPk Take 17 g by mouth 2 (two) times daily.    lamoTRIgine (LAMICTAL) 150 MG Tab Take 1 tablet (150 mg total) by mouth every morning.    lancets (ONETOUCH DELICA PLUS LANCET) 30 gauge Misc Use as directed 3 times daily    levocetirizine (XYZAL) 5 MG tablet Take 1 tablet (5 mg total) by mouth every evening.    LIDOcaine-prilocaine (EMLA) cream Apply topically up to 4 times per day to affected area for pain relief    linaCLOtide (LINZESS) 290 mcg Cap capsule Take 1 capsule (290 mcg total) by mouth before breakfast.    lurasidone (LATUDA) 120 mg Tab Take 1 tablet by mouth every night before bedtime    magnesium oxide (MAG-OX) 400 mg (241.3 mg magnesium) tablet Take 1 tablet (400 mg total) by mouth once daily.    metFORMIN  "(GLUCOPHAGE) 1000 MG tablet Take 1 tablet (1,000 mg total) by mouth 2 (two) times daily with meals.    metFORMIN (GLUCOPHAGE) 1000 MG tablet Take 1 tablet (1,000 mg total) by mouth 2 (two) times daily with meals.    naproxen (NAPROSYN) 500 MG tablet Take 1 tablet (500 mg total) by mouth 2 (two) times daily.    omeprazole (PRILOSEC) 40 MG capsule Take 1 capsule (40 mg total) by mouth once daily.    pen needle, diabetic (BD ULTRA-FINE MINI PEN NEEDLE) 31 gauge x 3/16" Ndle Use one needle 5 times a day    pen needle, diabetic (BD ULTRA-FINE MINI PEN NEEDLE) 31 gauge x 3/16" Ndle Use 5 a day    pneumoc 20-tatiana conj-dip cr,PF, (PREVNAR-20, PF,) 0.5 mL Syrg injection Inject 0.5 mLs into the muscle.    promethazine (PHENERGAN) 12.5 MG Tab     prucalopride (MOTEGRITY) 2 mg Tab Take 1 tablet (2 mg) by mouth once daily.    semaglutide (OZEMPIC) 2 mg/dose (8 mg/3 mL) PnIj Inject 2 mg into the skin every 7 days.    spironolactone (ALDACTONE) 25 MG tablet Take 1 tablet (25 mg total) by mouth once daily.    tiZANidine (ZANAFLEX) 4 MG tablet Take 2 tablets by mouth every 6 hours as needed    TOUJEO MAX U-300 SOLOSTAR 300 unit/mL (3 mL) insulin pen Inject 84 Units into the skin once daily.   Last reviewed on 7/14/2023 10:34 AM by Tierra Galan, PharmD    Review of patient's allergies indicates:   Allergen Reactions    Codeine Itching    Hydromorphone Other (See Comments)     Can't wake up for long time if taken  Slow to wake up after surgery after receiving    Aleve [naproxen sodium]     Lyrica [pregabalin] Itching    Motrin [ibuprofen]     Neuromuscular blockers, steroidal Hives     some    Latex, natural rubber Rash    Morphine Rash     itching    Norco [hydrocodone-acetaminophen] Itching, Rash and Hallucinations    Seconal [secobarbital sodium] Rash     itching    Tylox [oxycodone-acetaminophen] Rash   Last reviewed on  7/14/2023 10:34 AM by Tierra Galan    Drug Interactions    Drug interactions evaluated: yes  Clinically " relevant drug interactions identified: no  Provided the patient with educational material regarding drug interactions: not applicable         Adverse Effects    Arthralgias: Pos  Back pain: Pos  Gait problem: Pos  Joint swelling: Pos  Myalgias: Pos  Neck pain: Pos  Neck stiffness: Pos       Assessment Questions - Documented Responses      Flowsheet Row Most Recent Value   Assessment    Medication Reconciliation completed for patient Yes   During the past 4 weeks, has patient missed any activities due to condition or medication? No   During the past 4 weeks, did patient have any of the following urgent care visits? None   Goals of Therapy Status Discussed (new start)   Status of the patients ability to self-administer: Is Able   All education points have been covered with patient? Yes, supplemental printed education provided   Welcome packet contents reviewed and discussed with patient? Yes   Assesment completed? Yes   Plan Therapy being initiated   Do you need to open a clinical intervention (i-vent)? No   Do you want to schedule first shipment? Yes          Refill Questions - Documented Responses      Flowsheet Row Most Recent Value   Refill Screening Questions    When does the patient need to receive the medication? 07/17/23   Refill Delivery Questions    How will the patient receive the medication? [facility]   When does the patient need to receive the medication? 07/17/23   Shipping Address Home   [facility] and updated if neccessary? Yes   Expected Copay ($) 0   Is the patient able to afford the medication copay? Yes   Payment Method zero copay   Days supply of Refill 28   Supplies needed? No supplies needed   Refill activity completed? Yes   Refill activity plan Refill scheduled   Shipment/Pickup Date: 07/14/23            Objective    She has a past medical history of Abnormal Pap smear of cervix, Anemia, Anxiety, Arthritis, Asthma (10/11/2016), Bipolar 1 disorder, Diabetes mellitus,  "type 2, Dyslipidemia associated with type 2 diabetes mellitus (5/13/2019), General anesthetics causing adverse effect in therapeutic use, Genital warts, GERD (gastroesophageal reflux disease), Herpes simplex virus (HSV) infection, Hyperlipidemia, Hypertension, Hypertension complicating diabetes (5/5/2019), Migraine with aura and without status migrainosus, not intractable (3/21/2016), Mild persistent asthma without complication (10/11/2016), Morbid obesity with body mass index (BMI) of 45.0 to 49.9 in adult (8/3/2017), Obstructive sleep apnea, ALEN (obstructive sleep apnea), Schizoaffective disorder, bipolar type (4/25/2019), Seasonal allergic rhinitis due to pollen (5/13/2019), Type 2 diabetes mellitus with hyperglycemia, with long-term current use of insulin (12/21/2017), and Type 2 diabetes mellitus with hyperglycemia, without long-term current use of insulin (12/21/2017).    Tried/failed medications: NSAIDs, tizanidine, prednisone     BP Readings from Last 4 Encounters:   07/10/23 131/88   06/27/23 105/73   06/12/23 106/74   04/20/23 124/86     Ht Readings from Last 4 Encounters:   07/10/23 4' 8" (1.422 m)   06/27/23 4' 8" (1.422 m)   06/12/23 4' 8" (1.422 m)   04/20/23 4' 8" (1.422 m)     Wt Readings from Last 4 Encounters:   07/10/23 77.8 kg (171 lb 8.3 oz)   06/27/23 78.2 kg (172 lb 6.4 oz)   06/12/23 80.2 kg (176 lb 12.8 oz)   04/20/23 81.1 kg (178 lb 12.8 oz)       The goals of prescribed drug therapy management include:  Supporting patient to meet the prescriber's medical treatment objectives  Improving or maintaining quality of life  Maintaining optimal therapy adherence  Minimizing and managing side effects      Goals of Therapy Status: Discussed (new start)    Assessment/Plan  Patient plans to start therapy on 07/17/23      Indication, dosage, appropriateness, effectiveness, safety and convenience of her specialty medication(s) were reviewed today.     Patient Education   Patient received education on " the following:   Expectations and possible outcomes of therapy  Proper use, timely administration, and missed dose management  Duration of therapy  Side effects, including prevention, minimization, and management  Contraindications and safety precautions  New or changed medications, including prescribe and over the counter medications and supplements  Reviews recommended vaccinations, as appropriate  Storage, safe handling, and disposal    Patient educated on all topics and expressed understanding and no further questions.     Tasks added this encounter   No tasks added.   Tasks due within next 3 months   7/13/2023 - Initial Clinical Assessment/Patient Education (Annual Reassessment)  7/13/2023 - Set up Initial Fill     Tierra Galan, PharmD  [facility]  [address] [facility] [address]  Phone: [phone]  Fax: [phone]

## 2023-07-18 ENCOUNTER — TELEPHONE (OUTPATIENT)
Dept: CARDIOLOGY | Facility: CLINIC | Age: 53
End: 2023-07-18
Payer: MEDICAID

## 2023-07-18 NOTE — TELEPHONE ENCOUNTER
Contacted patient; Patient states she's been experiencing palpitations, nausea, and dizziness since taking her carvedilol. Patient denies any active chest pains or shortness of breathe.       ----- Message from Susan Matta sent at 7/18/2023  1:58 PM CDT -----  Contact: [phone]  Pt is calling regarding blood pressure medication, carvediloL (COREG) 6.25 MG tablet , pt states meds are making her feel nauseated  and dizzy . Please call back at [phone] . Thanksdj

## 2023-07-19 DIAGNOSIS — K59.00 CONSTIPATION, UNSPECIFIED CONSTIPATION TYPE: ICD-10-CM

## 2023-07-19 DIAGNOSIS — J30.1 SEASONAL ALLERGIC RHINITIS DUE TO POLLEN: Chronic | ICD-10-CM

## 2023-07-19 DIAGNOSIS — I10 ESSENTIAL HYPERTENSION: Primary | ICD-10-CM

## 2023-07-19 RX ORDER — METOPROLOL SUCCINATE 50 MG/1
50 TABLET, EXTENDED RELEASE ORAL NIGHTLY
Qty: 30 TABLET | Refills: 11 | Status: SHIPPED | OUTPATIENT
Start: 2023-07-19 | End: 2024-02-14 | Stop reason: DRUGHIGH

## 2023-07-19 RX ORDER — CYCLOSPORINE 0.5 MG/ML
1 EMULSION OPHTHALMIC 2 TIMES DAILY
Qty: 5.5 ML | Refills: 11 | Status: SHIPPED | OUTPATIENT
Start: 2023-07-19 | End: 2023-08-23 | Stop reason: SDUPTHER

## 2023-07-19 NOTE — TELEPHONE ENCOUNTER
No care due was identified.  [facility] Embedded Care Due Messages. Reference number: [id number].   7/19/2023 11:25:17 AM CDT

## 2023-07-19 NOTE — TELEPHONE ENCOUNTER
No care due was identified.  [facility] Embedded Care Due Messages. Reference number: [id number].   7/19/2023 11:26:23 AM CDT

## 2023-07-24 RX ORDER — FLUTICASONE PROPIONATE 50 MCG
2 SPRAY, SUSPENSION (ML) NASAL DAILY
Qty: 16 G | Refills: 11 | Status: SHIPPED | OUTPATIENT
Start: 2023-07-24 | End: 2024-07-23

## 2023-07-24 RX ORDER — TIZANIDINE 4 MG/1
TABLET ORAL
Qty: 240 TABLET | Refills: 0 | OUTPATIENT
Start: 2023-07-24

## 2023-07-24 RX ORDER — POLYETHYLENE GLYCOL 3350 17 G/17G
17 POWDER, FOR SOLUTION ORAL 2 TIMES DAILY
Qty: 180 EACH | Refills: 0 | Status: SHIPPED | OUTPATIENT
Start: 2023-07-24 | End: 2023-10-22

## 2023-07-25 ENCOUNTER — TELEPHONE (OUTPATIENT)
Dept: PSYCHIATRY | Facility: CLINIC | Age: 53
End: 2023-07-25
Payer: MEDICAID

## 2023-07-25 ENCOUNTER — TELEPHONE (OUTPATIENT)
Dept: DIABETES | Facility: CLINIC | Age: 53
End: 2023-07-25
Payer: MEDICAID

## 2023-07-25 DIAGNOSIS — E11.65 TYPE 2 DIABETES MELLITUS WITH HYPERGLYCEMIA, WITH LONG-TERM CURRENT USE OF INSULIN: ICD-10-CM

## 2023-07-25 DIAGNOSIS — Z79.4 TYPE 2 DIABETES MELLITUS WITH HYPERGLYCEMIA, WITH LONG-TERM CURRENT USE OF INSULIN: ICD-10-CM

## 2023-07-25 NOTE — TELEPHONE ENCOUNTER
----- Message from Vira Moeller sent at 7/25/2023  2:05 PM CDT -----  Contact: Yolanda Mathew states she is having high blood sugars and would like to discuss. Please call her back at [phone].    Thanks  TS

## 2023-07-25 NOTE — TELEPHONE ENCOUNTER
Nurse returned call to patient. The patient stated that she is feeling depressed, flat and numb. She stated that her insurance won't pay for the Lunesta and Xanax. She can't pay for the Lunesta but can pay for the Xanax. She states that she that she having trouble sleeping; up 3, 4 and 5 am. Nurse asked if she was having any SI/HI. She stated no. Nurse offer the available appointment for tomorrow 7/26/23 at 3pm. She yes and I scheduled the appointment.

## 2023-07-25 NOTE — TELEPHONE ENCOUNTER
Patient states she is having spikes in her blood sugars, after eating gumbo it went up to 135, yesterday after eating gumbo it went up to 2 something. Had sausage, skinless chicken and shrimp in it, some rice. Patient is on metformin 1000 BID and Toujeo 70 units at night, at night when she take her toujeo it goes down to 30-45 and will get an alarm. Patient use Dexcom, started it about 4 weeks ago.     Unable to pull her up in clarity, she says she set up her dexcom  herself, will have to come in for a nurse visit for a download. Set her up for a download tomorrow 7/26/23 @2:45pm

## 2023-07-26 ENCOUNTER — TELEPHONE (OUTPATIENT)
Dept: DIABETES | Facility: CLINIC | Age: 53
End: 2023-07-26

## 2023-07-26 ENCOUNTER — OFFICE VISIT (OUTPATIENT)
Dept: PSYCHIATRY | Facility: CLINIC | Age: 53
End: 2023-07-26
Payer: MEDICAID

## 2023-07-26 ENCOUNTER — PATIENT MESSAGE (OUTPATIENT)
Dept: DIABETES | Facility: CLINIC | Age: 53
End: 2023-07-26
Payer: MEDICAID

## 2023-07-26 ENCOUNTER — DOCUMENTATION ONLY (OUTPATIENT)
Dept: PSYCHIATRY | Facility: CLINIC | Age: 53
End: 2023-07-26
Payer: MEDICAID

## 2023-07-26 ENCOUNTER — CLINICAL SUPPORT (OUTPATIENT)
Dept: DIABETES | Facility: CLINIC | Age: 53
End: 2023-07-26
Payer: MEDICAID

## 2023-07-26 VITALS
DIASTOLIC BLOOD PRESSURE: 85 MMHG | BODY MASS INDEX: 38.8 KG/M2 | WEIGHT: 173.06 LBS | HEART RATE: 82 BPM | SYSTOLIC BLOOD PRESSURE: 130 MMHG

## 2023-07-26 DIAGNOSIS — F25.0 SCHIZOAFFECTIVE DISORDER, BIPOLAR TYPE: Primary | Chronic | ICD-10-CM

## 2023-07-26 DIAGNOSIS — F41.1 GENERALIZED ANXIETY DISORDER: Chronic | ICD-10-CM

## 2023-07-26 DIAGNOSIS — E11.65 TYPE 2 DIABETES MELLITUS WITH HYPERGLYCEMIA, WITH LONG-TERM CURRENT USE OF INSULIN: Primary | ICD-10-CM

## 2023-07-26 DIAGNOSIS — G47.00 INSOMNIA, UNSPECIFIED TYPE: ICD-10-CM

## 2023-07-26 DIAGNOSIS — Z79.4 TYPE 2 DIABETES MELLITUS WITH HYPERGLYCEMIA, WITH LONG-TERM CURRENT USE OF INSULIN: Primary | ICD-10-CM

## 2023-07-26 PROCEDURE — 3075F SYST BP GE 130 - 139MM HG: CPT | Mod: CPTII,,, | Performed by: PSYCHOLOGIST

## 2023-07-26 PROCEDURE — 90833 PR PSYCHOTHERAPY W/PATIENT W/E&M, 30 MIN (ADD ON): ICD-10-PCS | Mod: HP,HB,S$PBB, | Performed by: PSYCHOLOGIST

## 2023-07-26 PROCEDURE — 3044F PR MOST RECENT HEMOGLOBIN A1C LEVEL <7.0%: ICD-10-PCS | Mod: CPTII,,, | Performed by: PSYCHOLOGIST

## 2023-07-26 PROCEDURE — 1159F MED LIST DOCD IN RCRD: CPT | Mod: CPTII,,, | Performed by: PSYCHOLOGIST

## 2023-07-26 PROCEDURE — 3079F DIAST BP 80-89 MM HG: CPT | Mod: CPTII,,, | Performed by: PSYCHOLOGIST

## 2023-07-26 PROCEDURE — 3008F BODY MASS INDEX DOCD: CPT | Mod: CPTII,,, | Performed by: PSYCHOLOGIST

## 2023-07-26 PROCEDURE — 3079F PR MOST RECENT DIASTOLIC BLOOD PRESSURE 80-89 MM HG: ICD-10-PCS | Mod: CPTII,,, | Performed by: PSYCHOLOGIST

## 2023-07-26 PROCEDURE — 1159F PR MEDICATION LIST DOCUMENTED IN MEDICAL RECORD: ICD-10-PCS | Mod: CPTII,,, | Performed by: PSYCHOLOGIST

## 2023-07-26 PROCEDURE — 99214 PR OFFICE/OUTPT VISIT, EST, LEVL IV, 30-39 MIN: ICD-10-PCS | Mod: HP,HB,S$PBB, | Performed by: PSYCHOLOGIST

## 2023-07-26 PROCEDURE — 3072F PR LOW RISK FOR RETINOPATHY: ICD-10-PCS | Mod: CPTII,,, | Performed by: PSYCHOLOGIST

## 2023-07-26 PROCEDURE — 99214 OFFICE O/P EST MOD 30 MIN: CPT | Mod: HP,HB,S$PBB, | Performed by: PSYCHOLOGIST

## 2023-07-26 PROCEDURE — 3008F PR BODY MASS INDEX (BMI) DOCUMENTED: ICD-10-PCS | Mod: CPTII,,, | Performed by: PSYCHOLOGIST

## 2023-07-26 PROCEDURE — 90833 PSYTX W PT W E/M 30 MIN: CPT | Mod: HP,HB,S$PBB, | Performed by: PSYCHOLOGIST

## 2023-07-26 PROCEDURE — 3075F PR MOST RECENT SYSTOLIC BLOOD PRESS GE 130-139MM HG: ICD-10-PCS | Mod: CPTII,,, | Performed by: PSYCHOLOGIST

## 2023-07-26 PROCEDURE — 99212 OFFICE O/P EST SF 10 MIN: CPT | Mod: PBBFAC | Performed by: PSYCHOLOGIST

## 2023-07-26 PROCEDURE — 4010F ACE/ARB THERAPY RXD/TAKEN: CPT | Mod: CPTII,,, | Performed by: PSYCHOLOGIST

## 2023-07-26 PROCEDURE — 99999 PR PBB SHADOW E&M-EST. PATIENT-LVL II: ICD-10-PCS | Mod: PBBFAC,HB,, | Performed by: PSYCHOLOGIST

## 2023-07-26 PROCEDURE — 99999 PR PBB SHADOW E&M-EST. PATIENT-LVL II: CPT | Mod: PBBFAC,HB,, | Performed by: PSYCHOLOGIST

## 2023-07-26 PROCEDURE — 3072F LOW RISK FOR RETINOPATHY: CPT | Mod: CPTII,,, | Performed by: PSYCHOLOGIST

## 2023-07-26 PROCEDURE — 3044F HG A1C LEVEL LT 7.0%: CPT | Mod: CPTII,,, | Performed by: PSYCHOLOGIST

## 2023-07-26 PROCEDURE — 4010F PR ACE/ARB THEARPY RXD/TAKEN: ICD-10-PCS | Mod: CPTII,,, | Performed by: PSYCHOLOGIST

## 2023-07-26 RX ORDER — INSULIN GLARGINE 300 U/ML
50 INJECTION, SOLUTION SUBCUTANEOUS DAILY
Qty: 6 ML | Refills: 2
Start: 2023-07-26 | End: 2023-08-17

## 2023-07-26 RX ORDER — GLUCAGON 3 MG/1
1 POWDER NASAL
Qty: 2 EACH | Refills: 1 | Status: SHIPPED | OUTPATIENT
Start: 2023-07-26

## 2023-07-26 RX ORDER — ZOLPIDEM TARTRATE 5 MG/1
TABLET ORAL
Qty: 45 TABLET | Refills: 1 | Status: SHIPPED | OUTPATIENT
Start: 2023-07-26 | End: 2023-08-10 | Stop reason: ALTCHOICE

## 2023-07-26 NOTE — PATIENT INSTRUCTIONS

## 2023-07-26 NOTE — TELEPHONE ENCOUNTER
Elizabeth,     Ms. Betts came in today for her download (report in media) she also mentioned for me to check her sensor placement, I removed the old sensor and replaced with new one, patient had a good amount of blood in her sensor and the placement was more to the side and high, placed more to the center and further down, and also set her up with sharing, downloaded both clarity and dexcom. Will check with her in 2 hours to see if sensor warmed up.

## 2023-07-26 NOTE — TELEPHONE ENCOUNTER
Pt reports several occassions of low BG, she has the dexcom, she was not trained by the RD- she watch training videos. I reminded her to always verify severe highs and lows with meter. Peanut and jelly sandwich and orange juice resolved her most recent low BG. Needs a refill on Glucagon. She understands the hypoglycemia protocol. Toujeo lowered by 20 units for now.      Diabetes Medications               metFORMIN (GLUCOPHAGE) 1000 MG tablet Take 1 tablet (1,000 mg total) by mouth 2 (two) times daily with meals.         semaglutide (OZEMPIC) 2 mg/dose (8 mg/3 mL) PnIj Inject 2 mg into the skin every 7 days.    TOUJEO MAX U-300 SOLOSTAR 300 unit/mL (3 mL) insulin pen Inject 70 Units into the skin once daily. Lower to 50 units

## 2023-07-26 NOTE — PROGRESS NOTES
Put in today      CASE MANAGEMENT REFERRAL    MRN: [id number]  : 1970  Reason for referral: IOP with higher functioning group that takes Medicaid ([employer]) and provides transportation (that's not [facility]); she was previously at [facility] and had a negative experience; also needs financial assistance if possible

## 2023-07-26 NOTE — PROGRESS NOTES
"Outpatient Psychiatry Follow-Up Visit    7/26/2023      Chief Complaint:  Yolanda Betts is a 53 y.o. female who presents today for follow-up of mood and anxiety.       Impressions/Plan from last visit: Yolanda attended her visit. Her daughter brought her to the visit. Her cousin is living with her and helps her with some of the expenses. She was last seen by me in February of 2023--had been hospitalized and attended IOP since we last met. She finished the IOP on last Friday--she brought in her medicines--Latuda 120 mg; Lamictal 150 mg hs, Cymbalta 60 mg bid, Lunesta 2 mg hs, Xanax XR 2 mg hs. She does think that the medicines are working--but still has depression. She has not been able to work--on verge of foreclosure. Her vehicle has already been repossessed. She had FMLA with her job--then went to part-time and tried to work but only worked 2 weeks and then returned to hospital; then her job denied her FMLA. She last worked sometime in April. She has applied for disability back in January but has not heard anything from them. She is not sure if she applied for Title 2 or 16. As we talked about this, she became more anxious--started visibly shaking. She does have an upcoming therapy appt with [facility] ([phone]) and hopes to be seen weekly for support. She is still having anger issues--"coming from family"; very easily agitated. Reports having panic attacks--"feels like something sitting on my chest." She has them daily--sometimes lasts for 90 minutes. She calls her daughter; does breathing exercises/meditation--helps some. We talked about the mood card for biofeedback. When asked about involuntary movements, she said that she has jerking movements sometimes and feels it in her sleep--happens daily. She described kicking out with her knee or extending her elbow. She reported continuing to hear voices--regardless of mood; also reported seeing movement out of her periphery, sees shadows; sometimes sees an " "animal or a person when no one else can see it. She reported crying spells, lasting 10-20 minutes--we discussed her journaling/tracking those with her thoughts and events, along with the panic.    Plan--continue Latuda 120 mg, Lamictal 150 mg (switch to mornings); Cymbalta 60 mg bid; Lunesta 2 mg hs; Xanax XR 2 mg hs; neurology referral; Journal crying spells and panic    Interval History and Content of Current Session: Yolanda attended her visit. She recently called that she has been feeling "depressed, flat, and numb" and has had trouble getting Lunesta and Xanax due to ins problems. She had to pay for it out of pocket--has taken it (2 mg) but is only sleeping about 4 hours and then is up. She is anxious--having panic attacks. Her triggers are "my homelife, my children, and not being financially stable." Her cousin had been staying with her but was in an accident and is rehab--no longer helping financially. Yolanda is still not working--"they let me go while I was on FMLA." Her vehicle has been repossessed. She has been looking for a work from home job but not been successful. She denies self-harm thoughts or suicidal ideation--"no emotions at all. I can't even cry right now." She may be in the fight/flight/freeze mode and frozen. She has applied for disability--reportedly just starting on her claim this month, though she filed in January. She is receiving food stamps. She was fired in March and reportedly filed for unemployment but has not heard back. She was urged to check with the [employer] Commission again. We discussed a trial of Ambien 7.5 mg--5 mg was not enough previously and 10 mg made her too groggy. She was urged to not take Ambien and Xanax together (switch Xanax XR to mornings). See below.    Plan--continue Latuda 120 mg, Lamictal 150 mg (switch to mornings); Cymbalta 60 mg bid; Xanax XR 2 mg hs; Ambien 7.5 mg hs; case mgmt referral--IOP with higher functioning group that takes Medicaid ([employer]) and provides " transportation (that's not [facility]); she was previously at [facility] and had a negative experience; also needs financial assistance if possible;  Check with [employer] regarding assistance; Contact [employer] Commission regarding unemployment; switch Xanax XR to mornings     since June 2023.          PSYCHOTHERAPY      Site: [facility]  Time: 16 minutes  Participants: Met with patient    Therapeutic Intervention Type: behavior modifying psychotherapy, supportive psychotherapy  Why chosen therapy is appropriate versus another modality: patient responds to this modality, evidence based practice    Target symptoms: depression, anxiety , mood disorder, psychosis, financial stress  Primary focus: see above    Outcome monitoring methods: self-report, observation    Patient's response to intervention:  The patient's response to intervention is accepting.    Progress toward goals:  The patient's progress toward goals is not progressing.    ----------------------------------------------------------------------------------------------------------  Prior medicines: Prozac, Wellbutrin, Paxil, Lamictal, Lexapro, Celexa, Cymbalta, Remeron, Abilify, Seroquel (raised blood sugars), Risperdal, Restoril, Ambien (groggy), trazodone, Xanax, clonazepam    GAD7 6/12/2023 2/24/2023 1/4/2023   1. Feeling nervous, anxious, or on edge? 3 1 3   2. Not being able to stop or control worrying? 3 1 3   3. Worrying too much about different things? 3 1 3   4. Trouble relaxing? 3 1 3   5. Being so restless that it is hard to sit still? 3 2 3   6. Becoming easily annoyed or irritable? 3 1 3   7. Feeling afraid as if something awful might happen? 0 1 3   JOANN-7 Score 18 8 21      0-4 = Minimal anxiety  5-9 = Mild anxiety  10-14 = Moderate anxiety  15-21 = Severe anxiety       Review of Systems   PSYCHIATRIC: Pertinant items are noted in the narrative.    Past Medical, Family and Social History: The patient's past medical, family and social  "history have been reviewed and updated as appropriate within the electronic medical record - see encounter notes.      Current Outpatient Medications:     adalimumab (HUMIRA,CF, PEN) 40 mg/0.4 mL PnKt, Inject 0.4 mLs (40 mg total) into the skin every 14 (fourteen) days., Disp: 2 pen , Rfl: 2    ALPRAZolam (XANAX XR) 2 MG Tb24, Take 1 tablet (2 mg total) by mouth once daily., Disp: 30 tablet, Rfl: 2    amlodipine-valsartan (EXFORGE)  mg per tablet, Take 1 tablet by mouth once daily., Disp: 90 tablet, Rfl: 3    aspirin (ECOTRIN) 81 MG EC tablet, Take 1 tablet by mouth daily, Disp: 30 tablet, Rfl: 0    atorvastatin (LIPITOR) 20 MG tablet, Take 1 tablet (20 mg total) by mouth every evening., Disp: 90 tablet, Rfl: 3    BD INSULIN SYRINGE ULTRA-FINE 1/2 mL 30 gauge x 1/2" Syrg, , Disp: , Rfl: 0    blood sugar diagnostic (ONETOUCH ULTRA TEST) Strp, 1 each by Misc.(Non-Drug; Combo Route) route 4 (four) times daily. [facility] Glucose Test Strips, Disp: 400 strip, Rfl: 3    blood sugar diagnostic (ONETOUCH ULTRA TEST) Strp, Check blood glucose 4 times daily as directed and as needed (dispense insurance preferred brand or patient choice), Disp: 200 each, Rfl: 5    budesonide-formoterol 160-4.5 mcg (SYMBICORT) 160-4.5 mcg/actuation HFAA, Inhale 2 puffs into the lungs every 12 (twelve) hours. Controller : Use spacer, Disp: 10.2 g, Rfl: 6    butalbital-acetaminophen-caffeine -40 mg (FIORICET, ESGIC) -40 mg per tablet, Take 1 tablet by mouth every 4 hours as needed for headache, Disp: 30 tablet, Rfl: 3    cycloSPORINE (RESTASIS MULTIDOSE) 0.05 % Drop, Place 1 drop into both eyes 2 (two) times daily., Disp: 5.5 mL, Rfl: 11    cycloSPORINE (RESTASIS) 0.05 % ophthalmic emulsion, Place 1 drop into both eyes twice daily, Disp: 60 each, Rfl: 0    cycloSPORINE 0.05 % Drop, Place 1 drop into both eyes 2 (two) times daily., Disp: 5.5 mL, Rfl: 11    diclofenac sodium (VOLTAREN) 1 % Gel, Apply topically to affected areas 4 " times daily, Disp: 100 g, Rfl: 0    DULoxetine (CYMBALTA) 60 MG capsule, Take 1 capsule by mouth twice daily, Disp: 60 capsule, Rfl: 2    ergocalciferol (ERGOCALCIFEROL) 50,000 unit Cap, Take 1 capsule twice weekly for 3 weeks then weekly, Disp: 12 capsule, Rfl: 3    estradioL (ESTRACE) 0.01 % (0.1 mg/gram) vaginal cream, Place 1 g vaginally twice a week., Disp: 42.5 g, Rfl: 2    fenofibrate (TRICOR) 145 MG tablet, Take 1 tablet (145 mg total) by mouth once daily., Disp: 90 tablet, Rfl: 3    flash glucose scanning reader (Zane Prep AMY 2 READER) [facility], Use as directed to check blood sugar, Disp: 1 each, Rfl: 1    fluticasone propionate (FLONASE) 50 mcg/actuation nasal spray, 2 sprays (100 mcg total) by Each Nare route once daily., Disp: 16 g, Rfl: 11    frovatriptan (FROVA) 2.5 MG tablet, Take 1 tablet at onset of acute migraine. May take 1 more tablet 2 hours later if needed. (MAX 2 TABLET PER DAY. MAX 4 TABLETS PER WEEK.), Disp: 9 tablet, Rfl: 1    furosemide (LASIX) 20 MG tablet, Take 1 tablet (20 mg total) by mouth once daily., Disp: 90 tablet, Rfl: 3    glucagon (BAQSIMI) 3 mg/actuation Spry, 1 spray by Nasal route as needed (hypoglycemia). For emergency use. May repeat 1 time after 15 minutes, Disp: 2 each, Rfl: 1    lamoTRIgine (LAMICTAL) 150 MG Tab, Take 1 tablet (150 mg total) by mouth every morning., Disp: 30 tablet, Rfl: 2    lancets (ONETOUCH DELICA PLUS LANCET) 30 gauge Misc, Use as directed 3 times daily, Disp: 100 each, Rfl: 11    levocetirizine (XYZAL) 5 MG tablet, Take 1 tablet (5 mg total) by mouth every evening., Disp: 30 tablet, Rfl: 11    LIDOcaine-prilocaine (EMLA) cream, Apply topically up to 4 times per day to affected area for pain relief, Disp: 60 g, Rfl: 0    linaCLOtide (LINZESS) 290 mcg Cap capsule, Take 1 capsule (290 mcg total) by mouth before breakfast., Disp: 30 capsule, Rfl: 5    lurasidone (LATUDA) 120 mg Tab, Take 1 tablet by mouth every night before bedtime, Disp: 30 tablet,  "Rfl: 2    magnesium oxide (MAG-OX) 400 mg (241.3 mg magnesium) tablet, Take 1 tablet (400 mg total) by mouth once daily., Disp: 90 tablet, Rfl: 3    metFORMIN (GLUCOPHAGE) 1000 MG tablet, Take 1 tablet (1,000 mg total) by mouth 2 (two) times daily with meals., Disp: 180 tablet, Rfl: 1    metFORMIN (GLUCOPHAGE) 1000 MG tablet, Take 1 tablet (1,000 mg total) by mouth 2 (two) times daily with meals., Disp: 180 tablet, Rfl: 1    metoprolol succinate (TOPROL-XL) 50 MG 24 hr tablet, Take 1 tablet (50 mg total) by mouth every evening., Disp: 30 tablet, Rfl: 11    naproxen (NAPROSYN) 500 MG tablet, Take 1 tablet (500 mg total) by mouth 2 (two) times daily., Disp: 60 tablet, Rfl: 1    omeprazole (PRILOSEC) 40 MG capsule, Take 1 capsule (40 mg total) by mouth once daily., Disp: 30 capsule, Rfl: 11    pen needle, diabetic (BD ULTRA-FINE MINI PEN NEEDLE) 31 gauge x 3/16" Ndle, Use one needle 5 times a day, Disp: 200 each, Rfl: 11    pen needle, diabetic (BD ULTRA-FINE MINI PEN NEEDLE) 31 gauge x 3/16" Ndle, Use 5 a day, Disp: 200 each, Rfl: 3    pneumoc 20-tatiana conj-dip cr,PF, (PREVNAR-20, PF,) 0.5 mL Syrg injection, Inject 0.5 mLs into the muscle., Disp: 0.5 mL, Rfl: 0    polyethylene glycol (GLYCOLAX) 17 gram PwPk, Take 1 packet (17 g) by mouth 2 (two) times daily., Disp: 180 each, Rfl: 0    promethazine (PHENERGAN) 12.5 MG Tab, , Disp: , Rfl:     prucalopride (MOTEGRITY) 2 mg Tab, Take 1 tablet (2 mg) by mouth once daily., Disp: 30 tablet, Rfl: 11    semaglutide (OZEMPIC) 2 mg/dose (8 mg/3 mL) PnIj, Inject 2 mg into the skin every 7 days., Disp: 3 mL, Rfl: 5    spironolactone (ALDACTONE) 25 MG tablet, Take 1 tablet (25 mg total) by mouth once daily., Disp: 90 tablet, Rfl: 3    tiZANidine (ZANAFLEX) 4 MG tablet, Take 2 tablets by mouth every 6 hours as needed, Disp: 240 tablet, Rfl: 0    TOUJEO MAX U-300 SOLOSTAR 300 unit/mL (3 mL) insulin pen, Inject 50 Units into the skin once daily., Disp: 6 mL, Rfl: 2    zolpidem (AMBIEN) " "5 MG Tab, Take 1.5 tabs (7.5 mg) by mouth at night to sleep, Disp: 45 tablet, Rfl: 1    Compliance: yes    Side effects: see above    Risk Parameters:  Patient reports no suicidal ideation  Patient reports no homicidal ideation  Patient reports no self-injurious behavior  Patient reports no violent behavior    Exam (detailed: at least 9 elements; comprehensive: all 15 elements)   Constitutional  Vitals:  Most recent vital signs were reviewed.   Last 3 sets of Vitals    Vitals - 1 value per visit 7/10/2023 7/10/2023 7/26/2023   SYSTOLIC - 131 130   DIASTOLIC - 88 85   Pulse - 88 82   Temp - - -   Resp - - -   SPO2 - - -   Weight (lb) - 171.52 173.06   Weight (kg) - 77.8 78.5   Height - 56 -   BMI (Calculated) - 38.5 -   VISIT REPORT - - -   Pain Score  7 - -   Some recent data might be hidden          General:  age appropriate, casually dressed, neatly groomed, wearing glasses     Musculoskeletal  Muscle Strength/Tone:  no tremor, no tic   Gait & Station:  non-ataxic     Psychiatric  Speech:  no latency; no press, soft   Behavior: Still compared to prior visits   Mood & Affect:  "Numb"  blunted   Thought Process:  normal and logical   Associations:  intact   Thought Content:  Denied suicidal ideation--unclear about hallucinations   Insight:  has awareness of illness   Judgement: behavior is adequate to circumstances   Orientation:  grossly intact   Memory: intact for content of interview   Language: grossly intact   Attention Span & Concentration:  Grossly intact   Fund of Knowledge:  intact and appropriate to age and level of education     Assessment and Diagnosis   Status/Progress: Based on the examination today, the patient's problem(s) is/are treatment resistant, worsening, and failing to respond as expected to treatment.  New problems have been presented today.   Co-morbidities and psychosocial stressors  are complicating management of the primary condition.  The working differential for this patient includes " "schizoaffective vs bipolar.     General Impression:     Encounter Diagnoses   Name Primary?    Schizoaffective disorder, bipolar type Yes    Generalized anxiety disorder     Insomnia, unspecified type        Intervention/Counseling/Treatment Plan   Medication Management: Discussed risks, benefits, and alternatives to treatment plan documented above with patient. I answered all patient questions related to this plan, and patient expressed understanding and agreement.   continue Latuda 120 mg, Lamictal 150 mg (switch to mornings); Cymbalta 60 mg bid; Xanax XR 2 mg hs; Ambien 7.5 mg hs  Continue with therapy    Case mgmt referral--IOP with higher functioning group that takes Medicaid ([employer]) and provides transportation (that's not [facility]); she was previously at [facility] and had a negative experience; also needs financial assistance if possible  Check with [employer] regarding assistance  Contact [employer] regarding unemployment  switch Xanax XR to mornings      Medication List with Changes/Refills   Current Medications    ADALIMUMAB (HUMIRA,CF, PEN) 40 MG/0.4 ML PNKT    Inject 0.4 mLs (40 mg total) into the skin every 14 (fourteen) days.    ALPRAZOLAM (XANAX XR) 2 MG TB24    Take 1 tablet (2 mg total) by mouth once daily.    AMLODIPINE-VALSARTAN (EXFORGE)  MG PER TABLET    Take 1 tablet by mouth once daily.    ASPIRIN (ECOTRIN) 81 MG EC TABLET    Take 1 tablet by mouth daily    ATORVASTATIN (LIPITOR) 20 MG TABLET    Take 1 tablet (20 mg total) by mouth every evening.    BD INSULIN SYRINGE ULTRA-FINE 1/2 ML 30 GAUGE X 1/2" SYRG        BLOOD SUGAR DIAGNOSTIC (ONETOUCH ULTRA TEST) STRP    1 each by Misc.(Non-Drug; Combo Route) route 4 (four) times daily. [facility] Glucose Test Strips    BLOOD SUGAR DIAGNOSTIC (ONETOUCH ULTRA TEST) STRP    Check blood glucose 4 times daily as directed and as needed (dispense insurance preferred brand or patient choice)    BUDESONIDE-FORMOTEROL 160-4.5 MCG (SYMBICORT) 160-4.5 " MCG/ACTUATION HFAA    Inhale 2 puffs into the lungs every 12 (twelve) hours. Controller : Use spacer    BUTALBITAL-ACETAMINOPHEN-CAFFEINE -40 MG (FIORICET, ESGIC) -40 MG PER TABLET    Take 1 tablet by mouth every 4 hours as needed for headache    CYCLOSPORINE (RESTASIS MULTIDOSE) 0.05 % DROP    Place 1 drop into both eyes 2 (two) times daily.    CYCLOSPORINE (RESTASIS) 0.05 % OPHTHALMIC EMULSION    Place 1 drop into both eyes twice daily    CYCLOSPORINE 0.05 % DROP    Place 1 drop into both eyes 2 (two) times daily.    DICLOFENAC SODIUM (VOLTAREN) 1 % GEL    Apply topically to affected areas 4 times daily    DULOXETINE (CYMBALTA) 60 MG CAPSULE    Take 1 capsule by mouth twice daily    ERGOCALCIFEROL (ERGOCALCIFEROL) 50,000 UNIT CAP    Take 1 capsule twice weekly for 3 weeks then weekly    ESTRADIOL (ESTRACE) 0.01 % (0.1 MG/GRAM) VAGINAL CREAM    Place 1 g vaginally twice a week.    FENOFIBRATE (TRICOR) 145 MG TABLET    Take 1 tablet (145 mg total) by mouth once daily.    FLASH GLUCOSE SCANNING READER (Rum[employer] AMY 2 READER) MISC    Use as directed to check blood sugar    FLUTICASONE PROPIONATE (FLONASE) 50 MCG/ACTUATION NASAL SPRAY    2 sprays (100 mcg total) by Each Nare route once daily.    FROVATRIPTAN (FROVA) 2.5 MG TABLET    Take 1 tablet at onset of acute migraine. May take 1 more tablet 2 hours later if needed. (MAX 2 TABLET PER DAY. MAX 4 TABLETS PER WEEK.)    FUROSEMIDE (LASIX) 20 MG TABLET    Take 1 tablet (20 mg total) by mouth once daily.    GLUCAGON (BAQSIMI) 3 MG/ACTUATION SPRY    1 spray by Nasal route as needed (hypoglycemia). For emergency use. May repeat 1 time after 15 minutes    LAMOTRIGINE (LAMICTAL) 150 MG TAB    Take 1 tablet (150 mg total) by mouth every morning.    LANCETS (ONETOUCH DELICA PLUS LANCET) 30 GAUGE MISC    Use as directed 3 times daily    LEVOCETIRIZINE (XYZAL) 5 MG TABLET    Take 1 tablet (5 mg total) by mouth every evening.    LIDOCAINE-PRILOCAINE (EMLA) CREAM    " Apply topically up to 4 times per day to affected area for pain relief    LINACLOTIDE (LINZESS) 290 MCG CAP CAPSULE    Take 1 capsule (290 mcg total) by mouth before breakfast.    LURASIDONE (LATUDA) 120 MG TAB    Take 1 tablet by mouth every night before bedtime    MAGNESIUM OXIDE (MAG-OX) 400 MG (241.3 MG MAGNESIUM) TABLET    Take 1 tablet (400 mg total) by mouth once daily.    METFORMIN (GLUCOPHAGE) 1000 MG TABLET    Take 1 tablet (1,000 mg total) by mouth 2 (two) times daily with meals.    METFORMIN (GLUCOPHAGE) 1000 MG TABLET    Take 1 tablet (1,000 mg total) by mouth 2 (two) times daily with meals.    METOPROLOL SUCCINATE (TOPROL-XL) 50 MG 24 HR TABLET    Take 1 tablet (50 mg total) by mouth every evening.    NAPROXEN (NAPROSYN) 500 MG TABLET    Take 1 tablet (500 mg total) by mouth 2 (two) times daily.    OMEPRAZOLE (PRILOSEC) 40 MG CAPSULE    Take 1 capsule (40 mg total) by mouth once daily.    PEN NEEDLE, DIABETIC (BD ULTRA-FINE MINI PEN NEEDLE) 31 GAUGE X 3/16" NDLE    Use one needle 5 times a day    PEN NEEDLE, DIABETIC (BD ULTRA-FINE MINI PEN NEEDLE) 31 GAUGE X 3/16" NDLE    Use 5 a day    PNEUMOC 20-RAY CONJ-DIP CR,PF, (PREVNAR-20, PF,) 0.5 ML SYRG INJECTION    Inject 0.5 mLs into the muscle.    POLYETHYLENE GLYCOL (GLYCOLAX) 17 GRAM PWPK    Take 1 packet (17 g) by mouth 2 (two) times daily.    PROMETHAZINE (PHENERGAN) 12.5 MG TAB        PRUCALOPRIDE (MOTEGRITY) 2 MG TAB    Take 1 tablet (2 mg) by mouth once daily.    SEMAGLUTIDE (OZEMPIC) 2 MG/DOSE (8 MG/3 ML) PNIJ    Inject 2 mg into the skin every 7 days.    SPIRONOLACTONE (ALDACTONE) 25 MG TABLET    Take 1 tablet (25 mg total) by mouth once daily.    TIZANIDINE (ZANAFLEX) 4 MG TABLET    Take 2 tablets by mouth every 6 hours as needed    TOUJEO MAX U-300 SOLOSTAR 300 UNIT/ML (3 ML) INSULIN PEN    Inject 50 Units into the skin once daily.   Changed and/or Refilled Medications    Modified Medication Previous Medication    ZOLPIDEM (AMBIEN) 5 MG TAB " zolpidem (AMBIEN) 5 MG Tab       Take 1.5 tabs (7.5 mg) by mouth at night to sleep    Take 5 mg by mouth nightly as needed.   Discontinued Medications    ESZOPICLONE (LUNESTA) 2 MG TAB    Take 1 tablet by mouth every night at bedtime        Return to Clinic: as scheduled      I spent an additional 30 minutes performing E/M services with >50% spent on counseling, guidance, coordinating care (not Psychotherapy related) in addition to the 16 minutes performing Psychotherapy.    Time spent with pt including note preparation: 46 minutes       Mariam Young, PhD, MP  Advanced Practice Medical Psychologist  [facility]  [address] [facility].  [place] [address]  [phone]   [phone] fax

## 2023-07-26 NOTE — PROGRESS NOTES
Patient's sensor needed replacing, replaced sensor, downloaded clarity and dexcom on phone, patient now sharing dexcom. Will check with her in 2 hours to make sure sensor is reading properly

## 2023-07-28 ENCOUNTER — TELEPHONE (OUTPATIENT)
Dept: DIABETES | Facility: CLINIC | Age: 53
End: 2023-07-28
Payer: MEDICAID

## 2023-07-28 NOTE — TELEPHONE ENCOUNTER
Called patient to see if she is still experiencing lows after decreasing insulin     Left a message

## 2023-07-28 NOTE — TELEPHONE ENCOUNTER
Called patient and let her know to decrease the Toujeo 10 more units and reach out to us on Monday to let us know how things are going     Patient voiced understanding

## 2023-07-28 NOTE — TELEPHONE ENCOUNTER
----- Message from Jose Miguel Remy sent at 7/28/2023 10:36 AM CDT -----  Contact: Yolanda  Type:  Patient Returning Call    Who Called:Yolanda   Who Left Message for Patient: not sure   Does the patient know what this is regarding?:not sure   Would the patient rather a call back or a response via [employer]? Call back  Best Call Back Number:[phone]  Additional Information:   Thanks   Am

## 2023-07-31 ENCOUNTER — PATIENT MESSAGE (OUTPATIENT)
Dept: DIABETES | Facility: CLINIC | Age: 53
End: 2023-07-31
Payer: MEDICAID

## 2023-08-01 ENCOUNTER — TELEPHONE (OUTPATIENT)
Dept: PSYCHIATRY | Facility: CLINIC | Age: 53
End: 2023-08-01
Payer: MEDICAID

## 2023-08-02 ENCOUNTER — TELEPHONE (OUTPATIENT)
Dept: PSYCHIATRY | Facility: CLINIC | Age: 53
End: 2023-08-02
Payer: MEDICAID

## 2023-08-02 ENCOUNTER — PATIENT MESSAGE (OUTPATIENT)
Dept: PSYCHIATRY | Facility: CLINIC | Age: 53
End: 2023-08-02
Payer: MEDICAID

## 2023-08-02 NOTE — TELEPHONE ENCOUNTER
Nurse returned patient call regarding her medication. Nurse spoke with the patient. The patient stated that she is have trouble sleeping. She stated that she is waking up at 2 am and staying woke until 4 or 5 am and may get a little sleep. She also stated that the Xanax 2 mg that she takes during the day have her sleeping from 12 noon to 4 in the evening. She stated the provider have on Ambien 7.5 mg and she want to know if the provider can go up to the 10 mg.

## 2023-08-03 ENCOUNTER — SPECIALTY PHARMACY (OUTPATIENT)
Dept: PHARMACY | Facility: CLINIC | Age: 53
End: 2023-08-03
Payer: MEDICAID

## 2023-08-03 NOTE — TELEPHONE ENCOUNTER
Patient called in for refill of Humira. Too soon to fill. Next dose due 8/15. Ok with call back next week.

## 2023-08-04 ENCOUNTER — TELEPHONE (OUTPATIENT)
Dept: DIABETES | Facility: CLINIC | Age: 53
End: 2023-08-04
Payer: MEDICAID

## 2023-08-04 NOTE — TELEPHONE ENCOUNTER
----- Message from Reba Kirk sent at 8/4/2023  1:51 PM CDT -----  Contact: Patient, [phone]  Patient is trying to call you. Please call her back. Thanks.

## 2023-08-04 NOTE — TELEPHONE ENCOUNTER
Patient readings have improved. Her sugar is currently 92. Advise pt that in the future if her sugar is high to drink plenty of water. She voiced understanding.

## 2023-08-04 NOTE — TELEPHONE ENCOUNTER
----- Message from Jose Miguel Remy sent at 8/4/2023 10:51 AM CDT -----  Contact: Yolanda Guerrero would like a call back at [phone] in regards to her blood sugar being 221, and patient would like to know what to do.  Thanks   Am

## 2023-08-04 NOTE — TELEPHONE ENCOUNTER
Per Elizabeth, increase her water intake and call us in 2 hours, spoke with patient, she verbalized understanding

## 2023-08-08 ENCOUNTER — LAB VISIT (OUTPATIENT)
Dept: LAB | Facility: HOSPITAL | Age: 53
End: 2023-08-08
Attending: PHYSICIAN ASSISTANT
Payer: MEDICAID

## 2023-08-08 DIAGNOSIS — E78.1 HYPERTRIGLYCERIDEMIA: ICD-10-CM

## 2023-08-08 DIAGNOSIS — M47.819 SPONDYLOARTHROPATHY: ICD-10-CM

## 2023-08-08 DIAGNOSIS — M79.7 FIBROMYALGIA: ICD-10-CM

## 2023-08-08 LAB
ALBUMIN SERPL BCP-MCNC: 4 G/DL (ref 3.5–5.2)
ALP SERPL-CCNC: 51 U/L (ref 55–135)
ALT SERPL W/O P-5'-P-CCNC: 10 U/L (ref 10–44)
ANION GAP SERPL CALC-SCNC: 11 MMOL/L (ref 8–16)
AST SERPL-CCNC: 11 U/L (ref 10–40)
BASOPHILS # BLD AUTO: 0.05 K/UL (ref 0–0.2)
BASOPHILS NFR BLD: 0.6 % (ref 0–1.9)
BILIRUB SERPL-MCNC: 0.3 MG/DL (ref 0.1–1)
BUN SERPL-MCNC: 12 MG/DL (ref 6–20)
CALCIUM SERPL-MCNC: 10.4 MG/DL (ref 8.7–10.5)
CHLORIDE SERPL-SCNC: 103 MMOL/L (ref 95–110)
CHOLEST SERPL-MCNC: 107 MG/DL (ref 120–199)
CHOLEST/HDLC SERPL: 2.4 {RATIO} (ref 2–5)
CO2 SERPL-SCNC: 26 MMOL/L (ref 23–29)
CREAT SERPL-MCNC: 1 MG/DL (ref 0.5–1.4)
DIFFERENTIAL METHOD: ABNORMAL
EOSINOPHIL # BLD AUTO: 0.2 K/UL (ref 0–0.5)
EOSINOPHIL NFR BLD: 2.9 % (ref 0–8)
ERYTHROCYTE [DISTWIDTH] IN BLOOD BY AUTOMATED COUNT: 15.7 % (ref 11.5–14.5)
EST. GFR  (NO RACE VARIABLE): >60 ML/MIN/1.73 M^2
GLUCOSE SERPL-MCNC: 94 MG/DL (ref 70–110)
HCT VFR BLD AUTO: 33.5 % (ref 37–48.5)
HDLC SERPL-MCNC: 44 MG/DL (ref 40–75)
HDLC SERPL: 41.1 % (ref 20–50)
HGB BLD-MCNC: 10.4 G/DL (ref 12–16)
IMM GRANULOCYTES # BLD AUTO: 0.03 K/UL (ref 0–0.04)
IMM GRANULOCYTES NFR BLD AUTO: 0.4 % (ref 0–0.5)
LDLC SERPL CALC-MCNC: 43.4 MG/DL (ref 63–159)
LYMPHOCYTES # BLD AUTO: 3.5 K/UL (ref 1–4.8)
LYMPHOCYTES NFR BLD: 44.1 % (ref 18–48)
MCH RBC QN AUTO: 26.3 PG (ref 27–31)
MCHC RBC AUTO-ENTMCNC: 31 G/DL (ref 32–36)
MCV RBC AUTO: 85 FL (ref 82–98)
MONOCYTES # BLD AUTO: 0.4 K/UL (ref 0.3–1)
MONOCYTES NFR BLD: 4.5 % (ref 4–15)
NEUTROPHILS # BLD AUTO: 3.8 K/UL (ref 1.8–7.7)
NEUTROPHILS NFR BLD: 47.5 % (ref 38–73)
NONHDLC SERPL-MCNC: 63 MG/DL
NRBC BLD-RTO: 0 /100 WBC
PLATELET # BLD AUTO: 479 K/UL (ref 150–450)
PMV BLD AUTO: 9 FL (ref 9.2–12.9)
POTASSIUM SERPL-SCNC: 4.8 MMOL/L (ref 3.5–5.1)
PROT SERPL-MCNC: 7.1 G/DL (ref 6–8.4)
RBC # BLD AUTO: 3.95 M/UL (ref 4–5.4)
SODIUM SERPL-SCNC: 140 MMOL/L (ref 136–145)
TRIGL SERPL-MCNC: 98 MG/DL (ref 30–150)
WBC # BLD AUTO: 7.92 K/UL (ref 3.9–12.7)

## 2023-08-08 PROCEDURE — 85025 COMPLETE CBC W/AUTO DIFF WBC: CPT | Performed by: PHYSICIAN ASSISTANT

## 2023-08-08 PROCEDURE — 80061 LIPID PANEL: CPT | Performed by: INTERNAL MEDICINE

## 2023-08-08 PROCEDURE — 80053 COMPREHEN METABOLIC PANEL: CPT | Performed by: PHYSICIAN ASSISTANT

## 2023-08-10 ENCOUNTER — OFFICE VISIT (OUTPATIENT)
Dept: PSYCHIATRY | Facility: CLINIC | Age: 53
End: 2023-08-10
Payer: MEDICAID

## 2023-08-10 VITALS
WEIGHT: 173.5 LBS | HEART RATE: 81 BPM | SYSTOLIC BLOOD PRESSURE: 138 MMHG | BODY MASS INDEX: 38.9 KG/M2 | DIASTOLIC BLOOD PRESSURE: 89 MMHG

## 2023-08-10 DIAGNOSIS — F51.05 INSOMNIA DUE TO OTHER MENTAL DISORDER: ICD-10-CM

## 2023-08-10 DIAGNOSIS — F99 INSOMNIA DUE TO OTHER MENTAL DISORDER: ICD-10-CM

## 2023-08-10 DIAGNOSIS — Z91.89 AT RISK FOR POLYPHARMACY: ICD-10-CM

## 2023-08-10 DIAGNOSIS — F41.1 GENERALIZED ANXIETY DISORDER: Chronic | ICD-10-CM

## 2023-08-10 DIAGNOSIS — F25.0 SCHIZOAFFECTIVE DISORDER, BIPOLAR TYPE: Primary | Chronic | ICD-10-CM

## 2023-08-10 PROCEDURE — 3044F PR MOST RECENT HEMOGLOBIN A1C LEVEL <7.0%: ICD-10-PCS | Mod: CPTII,,, | Performed by: PSYCHOLOGIST

## 2023-08-10 PROCEDURE — 3044F HG A1C LEVEL LT 7.0%: CPT | Mod: CPTII,,, | Performed by: PSYCHOLOGIST

## 2023-08-10 PROCEDURE — 99215 PR OFFICE/OUTPT VISIT, EST, LEVL V, 40-54 MIN: ICD-10-PCS | Mod: HP,HB,S$PBB, | Performed by: PSYCHOLOGIST

## 2023-08-10 PROCEDURE — 3008F BODY MASS INDEX DOCD: CPT | Mod: CPTII,,, | Performed by: PSYCHOLOGIST

## 2023-08-10 PROCEDURE — 90833 PR PSYCHOTHERAPY W/PATIENT W/E&M, 30 MIN (ADD ON): ICD-10-PCS | Mod: HP,HB,, | Performed by: PSYCHOLOGIST

## 2023-08-10 PROCEDURE — 99215 OFFICE O/P EST HI 40 MIN: CPT | Mod: HP,HB,S$PBB, | Performed by: PSYCHOLOGIST

## 2023-08-10 PROCEDURE — 3079F PR MOST RECENT DIASTOLIC BLOOD PRESSURE 80-89 MM HG: ICD-10-PCS | Mod: CPTII,,, | Performed by: PSYCHOLOGIST

## 2023-08-10 PROCEDURE — 1159F PR MEDICATION LIST DOCUMENTED IN MEDICAL RECORD: ICD-10-PCS | Mod: CPTII,,, | Performed by: PSYCHOLOGIST

## 2023-08-10 PROCEDURE — 3075F SYST BP GE 130 - 139MM HG: CPT | Mod: CPTII,,, | Performed by: PSYCHOLOGIST

## 2023-08-10 PROCEDURE — 90833 PSYTX W PT W E/M 30 MIN: CPT | Mod: HP,HB,, | Performed by: PSYCHOLOGIST

## 2023-08-10 PROCEDURE — 3075F PR MOST RECENT SYSTOLIC BLOOD PRESS GE 130-139MM HG: ICD-10-PCS | Mod: CPTII,,, | Performed by: PSYCHOLOGIST

## 2023-08-10 PROCEDURE — 99999 PR PBB SHADOW E&M-EST. PATIENT-LVL III: ICD-10-PCS | Mod: PBBFAC,HB,, | Performed by: PSYCHOLOGIST

## 2023-08-10 PROCEDURE — 3072F LOW RISK FOR RETINOPATHY: CPT | Mod: CPTII,,, | Performed by: PSYCHOLOGIST

## 2023-08-10 PROCEDURE — 99213 OFFICE O/P EST LOW 20 MIN: CPT | Mod: PBBFAC | Performed by: PSYCHOLOGIST

## 2023-08-10 PROCEDURE — 99999 PR PBB SHADOW E&M-EST. PATIENT-LVL III: CPT | Mod: PBBFAC,HB,, | Performed by: PSYCHOLOGIST

## 2023-08-10 PROCEDURE — 4010F PR ACE/ARB THEARPY RXD/TAKEN: ICD-10-PCS | Mod: CPTII,,, | Performed by: PSYCHOLOGIST

## 2023-08-10 PROCEDURE — 4010F ACE/ARB THERAPY RXD/TAKEN: CPT | Mod: CPTII,,, | Performed by: PSYCHOLOGIST

## 2023-08-10 PROCEDURE — 3072F PR LOW RISK FOR RETINOPATHY: ICD-10-PCS | Mod: CPTII,,, | Performed by: PSYCHOLOGIST

## 2023-08-10 PROCEDURE — 3008F PR BODY MASS INDEX (BMI) DOCUMENTED: ICD-10-PCS | Mod: CPTII,,, | Performed by: PSYCHOLOGIST

## 2023-08-10 PROCEDURE — 3079F DIAST BP 80-89 MM HG: CPT | Mod: CPTII,,, | Performed by: PSYCHOLOGIST

## 2023-08-10 PROCEDURE — 1159F MED LIST DOCD IN RCRD: CPT | Mod: CPTII,,, | Performed by: PSYCHOLOGIST

## 2023-08-10 RX ORDER — LURASIDONE HYDROCHLORIDE 80 MG/1
80 TABLET, FILM COATED ORAL DAILY
Qty: 30 TABLET | Refills: 2 | Status: SHIPPED | OUTPATIENT
Start: 2023-08-10 | End: 2023-10-10 | Stop reason: SDUPTHER

## 2023-08-10 RX ORDER — ALPRAZOLAM 1 MG/1
1 TABLET ORAL 3 TIMES DAILY PRN
Qty: 90 TABLET | Refills: 1 | Status: SHIPPED | OUTPATIENT
Start: 2023-08-10 | End: 2023-09-06

## 2023-08-10 RX ORDER — DULOXETIN HYDROCHLORIDE 30 MG/1
CAPSULE, DELAYED RELEASE ORAL
Qty: 30 CAPSULE | Refills: 2 | Status: SHIPPED | OUTPATIENT
Start: 2023-08-10 | End: 2023-10-09

## 2023-08-10 RX ORDER — DOXEPIN HYDROCHLORIDE 10 MG/1
10 CAPSULE ORAL NIGHTLY
Qty: 30 CAPSULE | Refills: 2 | Status: SHIPPED | OUTPATIENT
Start: 2023-08-10 | End: 2023-09-12 | Stop reason: ALTCHOICE

## 2023-08-10 NOTE — PATIENT INSTRUCTIONS

## 2023-08-10 NOTE — PROGRESS NOTES
"Outpatient Psychiatry Follow-Up Visit    8/10/2023      Chief Complaint:  Yolanda Betts is a 53 y.o. female who presents today for follow-up of mood and anxiety.       Impressions/Plan from last visit: Yolanda attended her visit. She recently called that she has been feeling "depressed, flat, and numb" and has had trouble getting Lunesta and Xanax due to ins problems. She had to pay for it out of pocket--has taken it (2 mg) but is only sleeping about 4 hours and then is up. She is anxious--having panic attacks. Her triggers are "my homelife, my children, and not being financially stable." Her cousin had been staying with her but was in an accident and is rehab--no longer helping financially. Yolanda is still not working--"they let me go while I was on FMLA." Her vehicle has been repossessed. She has been looking for a work from home job but not been successful. She denies self-harm thoughts or suicidal ideation--"no emotions at all. I can't even cry right now." She may be in the fight/flight/freeze mode and frozen. She has applied for disability--reportedly just starting on her claim this month, though she filed in January. She is receiving food stamps. She was fired in March and reportedly filed for unemployment but has not heard back. She was urged to check with the Workforce Commission again. We discussed a trial of Ambien 7.5 mg--5 mg was not enough previously and 10 mg made her too groggy. She was urged to not take Ambien and Xanax together (switch Xanax XR to mornings). See below.    Plan--continue Latuda 120 mg, Lamictal 150 mg (switch to mornings); Cymbalta 60 mg bid; Xanax XR 2 mg hs; Ambien 7.5 mg hs; case mgmt referral--IOP with higher functioning group that takes Medicaid ([employer]) and provides transportation (that's not [facility]); she was previously at [facility] [facility] and had a negative experience; also needs financial assistance if possible;  Check with [employer] regarding assistance; Contact Workforce Commission " regarding unemployment; switch Xanax XR to mornings    Interval History and Content of Current Session: Yolanda attended her visit. Since we last met, she was sleeping too much--was taking Xanax during the day and taking long naps. Now that she has not been taking Ambien and taking the Xanax 2 mg at night, she is not sleeping. She has not heard anything about an IOP--waiting for case management. She has talked to [employer]--trying to work that out for income.    We discussed a change in her medicines--she used to take Xanax 1 mg tid and Remeron at night--but had a lot of weight gain. She has a lot of concerns about weight--also reported that she has had a slight tremor in her hands--complained of some muscle jumping in shoulder area at times at night. We agreed to make changes to her medicines--see below.    Plan--continue Lamictal 150 mg; decrease Latuda 80 mg; decrease Cymbalta 30 mg am and 60 mg hs; change to Xanax 1 mg tid; Waiting on case mgmt for IOP; Will need to consider neurology referral depending on tremor     since July 2023.          PSYCHOTHERAPY      Site: [facility]  Time: 16 minutes  Participants: Met with patient    Therapeutic Intervention Type: behavior modifying psychotherapy, supportive psychotherapy  Why chosen therapy is appropriate versus another modality: patient responds to this modality, evidence based practice    Target symptoms: depression, anxiety , mood disorder, psychosis, financial stress  Primary focus: see above    Outcome monitoring methods: self-report, observation    Patient's response to intervention:  The patient's response to intervention is accepting.    Progress toward goals:  The patient's progress toward goals is not progressing.    ----------------------------------------------------------------------------------------------------------  Therapist: Feroz Stanley LCSW with [facility]  Ph: [phone]  Fax:  "[phone]    Prior medicines: Prozac, Wellbutrin, Paxil, Lamictal, Lexapro, Celexa, Cymbalta, Remeron, Abilify, Seroquel (raised blood sugars), Risperdal, Restoril, Ambien (groggy), trazodone, Xanax, clonazepam    GAD7 6/12/2023 2/24/2023 1/4/2023   1. Feeling nervous, anxious, or on edge? 3 1 3   2. Not being able to stop or control worrying? 3 1 3   3. Worrying too much about different things? 3 1 3   4. Trouble relaxing? 3 1 3   5. Being so restless that it is hard to sit still? 3 2 3   6. Becoming easily annoyed or irritable? 3 1 3   7. Feeling afraid as if something awful might happen? 0 1 3   JOANN-7 Score 18 8 21      0-4 = Minimal anxiety  5-9 = Mild anxiety  10-14 = Moderate anxiety  15-21 = Severe anxiety       Review of Systems   PSYCHIATRIC: Pertinant items are noted in the narrative.    Past Medical, Family and Social History: The patient's past medical, family and social history have been reviewed and updated as appropriate within the electronic medical record - see encounter notes.      Current Outpatient Medications:     adalimumab (HUMIRA,CF, PEN) 40 mg/0.4 mL PnKt, Inject 0.4 mLs (40 mg total) into the skin every 14 (fourteen) days., Disp: 2 pen , Rfl: 2    ALPRAZolam (XANAX) 1 MG tablet, Take 1 tablet (1 mg total) by mouth 3 (three) times daily as needed for Anxiety., Disp: 90 tablet, Rfl: 1    amlodipine-valsartan (EXFORGE)  mg per tablet, Take 1 tablet by mouth once daily., Disp: 90 tablet, Rfl: 3    aspirin (ECOTRIN) 81 MG EC tablet, Take 1 tablet by mouth daily, Disp: 30 tablet, Rfl: 0    atorvastatin (LIPITOR) 20 MG tablet, Take 1 tablet (20 mg total) by mouth every evening., Disp: 90 tablet, Rfl: 3    BD INSULIN SYRINGE ULTRA-FINE 1/2 mL 30 gauge x 1/2" Syrg, , Disp: , Rfl: 0    blood sugar diagnostic (ONETOUCH ULTRA TEST) Strp, 1 each by Misc.(Non-Drug; Combo Route) route 4 (four) times daily. [facility] Glucose Test Strips, Disp: 400 strip, Rfl: 3    blood sugar diagnostic (ONETOUCH " ULTRA TEST) Strp, Check blood glucose 4 times daily as directed and as needed (dispense insurance preferred brand or patient choice), Disp: 200 each, Rfl: 5    budesonide-formoterol 160-4.5 mcg (SYMBICORT) 160-4.5 mcg/actuation HFAA, Inhale 2 puffs into the lungs every 12 (twelve) hours. Controller : Use spacer, Disp: 10.2 g, Rfl: 6    cycloSPORINE (RESTASIS MULTIDOSE) 0.05 % Drop, Place 1 drop into both eyes 2 (two) times daily., Disp: 5.5 mL, Rfl: 11    cycloSPORINE (RESTASIS) 0.05 % ophthalmic emulsion, Place 1 drop into both eyes twice daily, Disp: 60 each, Rfl: 0    cycloSPORINE 0.05 % Drop, Place 1 drop into both eyes 2 (two) times daily., Disp: 5.5 mL, Rfl: 11    diclofenac sodium (VOLTAREN) 1 % Gel, Apply topically to affected areas 4 times daily, Disp: 100 g, Rfl: 0    doxepin (SINEQUAN) 10 MG capsule, Take 1 capsule (10 mg total) by mouth every evening., Disp: 30 capsule, Rfl: 2    DULoxetine (CYMBALTA) 30 MG capsule, take 1 capsule by mouth every morning to replace the 60 mg in mornings, Disp: 30 capsule, Rfl: 2    DULoxetine (CYMBALTA) 60 MG capsule, Take 1 capsule by mouth twice daily, Disp: 60 capsule, Rfl: 2    ergocalciferol (ERGOCALCIFEROL) 50,000 unit Cap, Take 1 capsule twice weekly for 3 weeks then weekly, Disp: 12 capsule, Rfl: 3    estradioL (ESTRACE) 0.01 % (0.1 mg/gram) vaginal cream, Place 1 g vaginally twice a week., Disp: 42.5 g, Rfl: 2    fenofibrate (TRICOR) 145 MG tablet, Take 1 tablet (145 mg total) by mouth once daily., Disp: 90 tablet, Rfl: 3    flash glucose scanning reader (FREESTYLE AMY 2 READER) [facility], Use as directed to check blood sugar, Disp: 1 each, Rfl: 1    fluticasone propionate (FLONASE) 50 mcg/actuation nasal spray, 2 sprays (100 mcg total) by Each Nare route once daily., Disp: 16 g, Rfl: 11    frovatriptan (FROVA) 2.5 MG tablet, Take 1 tablet at onset of acute migraine. May take 1 more tablet 2 hours later if needed. (MAX 2 TABLET PER DAY. MAX 4 TABLETS PER WEEK.),  "Disp: 9 tablet, Rfl: 1    furosemide (LASIX) 20 MG tablet, Take 1 tablet (20 mg total) by mouth once daily., Disp: 90 tablet, Rfl: 3    glucagon (BAQSIMI) 3 mg/actuation Spry, 1 spray by Nasal route as needed (hypoglycemia). For emergency use. May repeat 1 time after 15 minutes, Disp: 2 each, Rfl: 1    lamoTRIgine (LAMICTAL) 150 MG Tab, Take 1 tablet (150 mg total) by mouth every morning., Disp: 30 tablet, Rfl: 2    lancets (ONETOUCH DELICA PLUS LANCET) 30 gauge Misc, Use as directed 3 times daily, Disp: 100 each, Rfl: 11    levocetirizine (XYZAL) 5 MG tablet, Take 1 tablet (5 mg total) by mouth every evening., Disp: 30 tablet, Rfl: 11    LIDOcaine-prilocaine (EMLA) cream, Apply topically up to 4 times per day to affected area for pain relief, Disp: 60 g, Rfl: 0    linaCLOtide (LINZESS) 290 mcg Cap capsule, Take 1 capsule (290 mcg total) by mouth before breakfast., Disp: 30 capsule, Rfl: 5    lurasidone (LATUDA) 80 mg Tab tablet, Take 1 tablet (80 mg total) by mouth once daily., Disp: 30 tablet, Rfl: 2    magnesium oxide (MAG-OX) 400 mg (241.3 mg magnesium) tablet, Take 1 tablet (400 mg total) by mouth once daily., Disp: 90 tablet, Rfl: 3    metFORMIN (GLUCOPHAGE) 1000 MG tablet, Take 1 tablet (1,000 mg total) by mouth 2 (two) times daily with meals., Disp: 180 tablet, Rfl: 1    metFORMIN (GLUCOPHAGE) 1000 MG tablet, Take 1 tablet (1,000 mg total) by mouth 2 (two) times daily with meals., Disp: 180 tablet, Rfl: 1    metoprolol succinate (TOPROL-XL) 50 MG 24 hr tablet, Take 1 tablet (50 mg total) by mouth every evening., Disp: 30 tablet, Rfl: 11    omeprazole (PRILOSEC) 40 MG capsule, Take 1 capsule (40 mg total) by mouth once daily., Disp: 30 capsule, Rfl: 11    pen needle, diabetic (BD ULTRA-FINE MINI PEN NEEDLE) 31 gauge x 3/16" Ndle, Use one needle 5 times a day, Disp: 200 each, Rfl: 11    pen needle, diabetic (BD ULTRA-FINE MINI PEN NEEDLE) 31 gauge x 3/16" Ndle, Use 5 a day, Disp: 200 each, Rfl: 3    pneumoc " 20-tatiana conj-dip cr,PF, (PREVNAR-20, PF,) 0.5 mL Syrg injection, Inject 0.5 mLs into the muscle., Disp: 0.5 mL, Rfl: 0    polyethylene glycol (GLYCOLAX) 17 gram PwPk, Take 1 packet (17 g) by mouth 2 (two) times daily., Disp: 180 each, Rfl: 0    promethazine (PHENERGAN) 12.5 MG Tab, , Disp: , Rfl:     prucalopride (MOTEGRITY) 2 mg Tab, Take 1 tablet (2 mg) by mouth once daily., Disp: 30 tablet, Rfl: 11    semaglutide (OZEMPIC) 2 mg/dose (8 mg/3 mL) PnIj, Inject 2 mg into the skin every 7 days., Disp: 3 mL, Rfl: 5    spironolactone (ALDACTONE) 25 MG tablet, Take 1 tablet (25 mg total) by mouth once daily., Disp: 90 tablet, Rfl: 3    tiZANidine (ZANAFLEX) 4 MG tablet, Take 2 tablets by mouth every 6 hours as needed, Disp: 240 tablet, Rfl: 0    TOUJEO MAX U-300 SOLOSTAR 300 unit/mL (3 mL) insulin pen, Inject 50 Units into the skin once daily., Disp: 6 mL, Rfl: 2    Compliance: yes    Side effects: see above    Risk Parameters:  Patient reports no suicidal ideation  Patient reports no homicidal ideation  Patient reports no self-injurious behavior  Patient reports no violent behavior    Exam (detailed: at least 9 elements; comprehensive: all 15 elements)   Constitutional  Vitals:  Most recent vital signs were reviewed.   Last 3 sets of Vitals    Vitals - 1 value per visit 7/10/2023 7/26/2023 8/10/2023   SYSTOLIC 131 130 138   DIASTOLIC 88 85 89   Pulse 88 82 81   Temp - - -   Resp - - -   SPO2 - - -   Weight (lb) 171.52 173.06 173.5   Weight (kg) 77.8 78.5 78.7   Height 56 - -   BMI (Calculated) 38.5 - -   VISIT REPORT [id number]  [id number]; 17NONCRENCREPNotFromCR  [id number]; 17NONCRENCREPNotFromCR  [id number];   Pain Score  - - -   Some recent data might be hidden          General:  age appropriate, casually dressed, neatly groomed, wearing glasses     Musculoskeletal  Muscle Strength/Tone:  no tic, very mild tremor at times   Gait & Station:  non-ataxic     Psychiatric  Speech:  no latency; no  "press, soft   Behavior: wnl   Mood & Affect:  anxious, depressed  blunted   Thought Process:  normal and logical   Associations:  intact   Thought Content:  Denied suicidal ideation--unclear about hallucinations--reports that she still sees "people, objects that are not there. I'm tired of seeing rats and mice." [Described as movement out of periphery and straight on]   Insight:  has awareness of illness   Judgement: behavior is adequate to circumstances   Orientation:  grossly intact   Memory: intact for content of interview   Language: grossly intact   Attention Span & Concentration:  Grossly intact   Fund of Knowledge:  intact and appropriate to age and level of education     Assessment and Diagnosis   Status/Progress: Based on the examination today, the patient's problem(s) is/are treatment resistant, worsening, and failing to respond as expected to treatment.  New problems have been presented today.   Co-morbidities and psychosocial stressors  are complicating management of the primary condition.  The working differential for this patient includes schizoaffective vs bipolar.     General Impression:     Encounter Diagnoses   Name Primary?    Schizoaffective disorder, bipolar type Yes    Generalized anxiety disorder     Insomnia due to other mental disorder     At risk for polypharmacy        Intervention/Counseling/Treatment Plan   Medication Management: Discussed risks, benefits, and alternatives to treatment plan documented above with patient. I answered all patient questions related to this plan, and patient expressed understanding and agreement.   continue Lamictal 150 mg; decrease Latuda 80 mg; decrease Cymbalta 30 mg am and 60 mg hs; change to Xanax 1 mg tid  Continue with therapy    Waiting on case mgmt for IOP  Will need to consider neurology referral depending on tremor      Medication List with Changes/Refills   New Medications    ALPRAZOLAM (XANAX) 1 MG TABLET    Take 1 tablet (1 mg total) by mouth 3 " "(three) times daily as needed for Anxiety.    DOXEPIN (SINEQUAN) 10 MG CAPSULE    Take 1 capsule (10 mg total) by mouth every evening.    DULOXETINE (CYMBALTA) 30 MG CAPSULE    take 1 capsule by mouth every morning to replace the 60 mg in mornings    LURASIDONE (LATUDA) 80 MG TAB TABLET    Take 1 tablet (80 mg total) by mouth once daily.   Current Medications    ADALIMUMAB (HUMIRA,CF, PEN) 40 MG/0.4 ML PNKT    Inject 0.4 mLs (40 mg total) into the skin every 14 (fourteen) days.    AMLODIPINE-VALSARTAN (EXFORGE)  MG PER TABLET    Take 1 tablet by mouth once daily.    ASPIRIN (ECOTRIN) 81 MG EC TABLET    Take 1 tablet by mouth daily    ATORVASTATIN (LIPITOR) 20 MG TABLET    Take 1 tablet (20 mg total) by mouth every evening.    BD INSULIN SYRINGE ULTRA-FINE 1/2 ML 30 GAUGE X 1/2" SYRG        BLOOD SUGAR DIAGNOSTIC (ONETOUCH ULTRA TEST) STRP    1 each by Misc.(Non-Drug; Combo Route) route 4 (four) times daily. [facility] Glucose Test Strips    BLOOD SUGAR DIAGNOSTIC (ONETOUCH ULTRA TEST) STRP    Check blood glucose 4 times daily as directed and as needed (dispense insurance preferred brand or patient choice)    BUDESONIDE-FORMOTEROL 160-4.5 MCG (SYMBICORT) 160-4.5 MCG/ACTUATION HFAA    Inhale 2 puffs into the lungs every 12 (twelve) hours. Controller : Use spacer    CYCLOSPORINE (RESTASIS MULTIDOSE) 0.05 % DROP    Place 1 drop into both eyes 2 (two) times daily.    CYCLOSPORINE (RESTASIS) 0.05 % OPHTHALMIC EMULSION    Place 1 drop into both eyes twice daily    CYCLOSPORINE 0.05 % DROP    Place 1 drop into both eyes 2 (two) times daily.    DICLOFENAC SODIUM (VOLTAREN) 1 % GEL    Apply topically to affected areas 4 times daily    DULOXETINE (CYMBALTA) 60 MG CAPSULE    Take 1 capsule by mouth twice daily    ERGOCALCIFEROL (ERGOCALCIFEROL) 50,000 UNIT CAP    Take 1 capsule twice weekly for 3 weeks then weekly    ESTRADIOL (ESTRACE) 0.01 % (0.1 MG/GRAM) VAGINAL CREAM    Place 1 g vaginally twice a week.    FENOFIBRATE " "(TRICOR) 145 MG TABLET    Take 1 tablet (145 mg total) by mouth once daily.    FLASH GLUCOSE SCANNING READER (FREESTYLE AMY 2 READER) MISC    Use as directed to check blood sugar    FLUTICASONE PROPIONATE (FLONASE) 50 MCG/ACTUATION NASAL SPRAY    2 sprays (100 mcg total) by Each Nare route once daily.    FROVATRIPTAN (FROVA) 2.5 MG TABLET    Take 1 tablet at onset of acute migraine. May take 1 more tablet 2 hours later if needed. (MAX 2 TABLET PER DAY. MAX 4 TABLETS PER WEEK.)    FUROSEMIDE (LASIX) 20 MG TABLET    Take 1 tablet (20 mg total) by mouth once daily.    GLUCAGON (BAQSIMI) 3 MG/ACTUATION SPRY    1 spray by Nasal route as needed (hypoglycemia). For emergency use. May repeat 1 time after 15 minutes    LAMOTRIGINE (LAMICTAL) 150 MG TAB    Take 1 tablet (150 mg total) by mouth every morning.    LANCETS (ONETOUCH DELICA PLUS LANCET) 30 GAUGE MISC    Use as directed 3 times daily    LEVOCETIRIZINE (XYZAL) 5 MG TABLET    Take 1 tablet (5 mg total) by mouth every evening.    LIDOCAINE-PRILOCAINE (EMLA) CREAM    Apply topically up to 4 times per day to affected area for pain relief    LINACLOTIDE (LINZESS) 290 MCG CAP CAPSULE    Take 1 capsule (290 mcg total) by mouth before breakfast.    MAGNESIUM OXIDE (MAG-OX) 400 MG (241.3 MG MAGNESIUM) TABLET    Take 1 tablet (400 mg total) by mouth once daily.    METFORMIN (GLUCOPHAGE) 1000 MG TABLET    Take 1 tablet (1,000 mg total) by mouth 2 (two) times daily with meals.    METFORMIN (GLUCOPHAGE) 1000 MG TABLET    Take 1 tablet (1,000 mg total) by mouth 2 (two) times daily with meals.    METOPROLOL SUCCINATE (TOPROL-XL) 50 MG 24 HR TABLET    Take 1 tablet (50 mg total) by mouth every evening.    OMEPRAZOLE (PRILOSEC) 40 MG CAPSULE    Take 1 capsule (40 mg total) by mouth once daily.    PEN NEEDLE, DIABETIC (BD ULTRA-FINE MINI PEN NEEDLE) 31 GAUGE X 3/16" NDLE    Use one needle 5 times a day    PEN NEEDLE, DIABETIC (BD ULTRA-FINE MINI PEN NEEDLE) 31 GAUGE X 3/16" NDLE    " Use 5 a day    PNEUMOC 20-RAY CONJ-DIP CR,PF, (PREVNAR-20, PF,) 0.5 ML SYRG INJECTION    Inject 0.5 mLs into the muscle.    POLYETHYLENE GLYCOL (GLYCOLAX) 17 GRAM PWPK    Take 1 packet (17 g) by mouth 2 (two) times daily.    PROMETHAZINE (PHENERGAN) 12.5 MG TAB        PRUCALOPRIDE (MOTEGRITY) 2 MG TAB    Take 1 tablet (2 mg) by mouth once daily.    SEMAGLUTIDE (OZEMPIC) 2 MG/DOSE (8 MG/3 ML) PNIJ    Inject 2 mg into the skin every 7 days.    SPIRONOLACTONE (ALDACTONE) 25 MG TABLET    Take 1 tablet (25 mg total) by mouth once daily.    TIZANIDINE (ZANAFLEX) 4 MG TABLET    Take 2 tablets by mouth every 6 hours as needed    TOUJEO MAX U-300 SOLOSTAR 300 UNIT/ML (3 ML) INSULIN PEN    Inject 50 Units into the skin once daily.   Discontinued Medications    ALPRAZOLAM (XANAX XR) 2 MG TB24    Take 1 tablet (2 mg total) by mouth once daily.    BUTALBITAL-ACETAMINOPHEN-CAFFEINE -40 MG (FIORICET, ESGIC) -40 MG PER TABLET    Take 1 tablet by mouth every 4 hours as needed for headache    LURASIDONE (LATUDA) 120 MG TAB    Take 1 tablet by mouth every night before bedtime    NAPROXEN (NAPROSYN) 500 MG TABLET    Take 1 tablet (500 mg total) by mouth 2 (two) times daily.    ZOLPIDEM (AMBIEN) 5 MG TAB    Take 1.5 tabs (7.5 mg) by mouth at night to sleep        Return to Clinic: 6 weeks      I spent an additional 26 minutes performing E/M services with >50% spent on counseling, guidance, coordinating care (not Psychotherapy related) in addition to the 16 minutes performing Psychotherapy.    Time spent with pt including note preparation: 42 minutes       Mariam Young, PhD, MP  Advanced Practice Medical Psychologist  [facility]--[address]  [place] [id number]  [phone]   [phone] fax

## 2023-08-11 ENCOUNTER — TELEPHONE (OUTPATIENT)
Dept: PSYCHIATRY | Facility: CLINIC | Age: 53
End: 2023-08-11
Payer: MEDICAID

## 2023-08-11 ENCOUNTER — DOCUMENTATION ONLY (OUTPATIENT)
Dept: PSYCHIATRY | Facility: CLINIC | Age: 53
End: 2023-08-11
Payer: MEDICAID

## 2023-08-11 NOTE — TELEPHONE ENCOUNTER
Specialty Pharmacy - Refill Coordination    Specialty Medication Orders Linked to Encounter      Flowsheet Row Most Recent Value   Medication #1 adalimumab (HUMIRA,CF, PEN) 40 mg/0.4 mL PnKt (Order#[id number], Rx#[id number])            Refill Questions - Documented Responses      Flowsheet Row Most Recent Value   Patient Availability and HIPAA Verification    Does patient want to proceed with activity? Yes   HIPAA/medical authority confirmed? Yes   Relationship to patient of person spoken to? Self   Refill Screening Questions    Changes to allergies? No   Changes to medications? No   New conditions since last clinic visit? No   Unplanned office visit, urgent care, ED, or hospital admission in the last 4 weeks? No   How does patient/caregiver feel medication is working? Good   Financial problems or insurance changes? No   How many doses of your specialty medications were missed in the last 4 weeks? 0   Would patient like to speak to a pharmacist? No   When does the patient need to receive the medication? 08/14/23   Refill Delivery Questions    How will the patient receive the medication? MEDRx   When does the patient need to receive the medication? 08/14/23   Shipping Address Home   Address in [facility] confirmed and updated if neccessary? Yes   Expected Copay ($) 0   Is the patient able to afford the medication copay? Yes   Payment Method zero copay   Days supply of Refill 28   Refill activity completed? Yes   Refill activity plan Refill scheduled   Shipment/Pickup Date: 08/11/23            Current Outpatient Medications   Medication Sig    adalimumab (HUMIRA,CF, PEN) 40 mg/0.4 mL PnKt Inject 0.4 mLs (40 mg total) into the skin every 14 (fourteen) days.    ALPRAZolam (XANAX) 1 MG tablet Take 1 tablet (1 mg total) by mouth 3 (three) times daily as needed for Anxiety.    amlodipine-valsartan (EXFORGE)  mg per tablet Take 1 tablet by mouth once daily.    aspirin (ECOTRIN) 81 MG EC tablet Take 1 tablet by  "mouth daily    atorvastatin (LIPITOR) 20 MG tablet Take 1 tablet (20 mg total) by mouth every evening.    BD INSULIN SYRINGE ULTRA-FINE 1/2 mL 30 gauge x 1/2" Syrg     blood sugar diagnostic (ONETOUCH ULTRA TEST) Strp 1 each by Misc.(Non-Drug; Combo Route) route 4 (four) times daily. iHeal Glucose Test Strips    blood sugar diagnostic (ONETOUCH ULTRA TEST) Strp Check blood glucose 4 times daily as directed and as needed (dispense insurance preferred brand or patient choice)    budesonide-formoterol 160-4.5 mcg (SYMBICORT) 160-4.5 mcg/actuation HFAA Inhale 2 puffs into the lungs every 12 (twelve) hours. Controller : Use spacer    cycloSPORINE (RESTASIS MULTIDOSE) 0.05 % Drop Place 1 drop into both eyes 2 (two) times daily.    cycloSPORINE (RESTASIS) 0.05 % ophthalmic emulsion Place 1 drop into both eyes twice daily    cycloSPORINE 0.05 % Drop Place 1 drop into both eyes 2 (two) times daily.    diclofenac sodium (VOLTAREN) 1 % Gel Apply topically to affected areas 4 times daily    doxepin (SINEQUAN) 10 MG capsule Take 1 capsule (10 mg total) by mouth every evening.    DULoxetine (CYMBALTA) 30 MG capsule take 1 capsule by mouth every morning to replace the 60 mg in mornings    DULoxetine (CYMBALTA) 60 MG capsule Take 1 capsule by mouth twice daily    ergocalciferol (ERGOCALCIFEROL) 50,000 unit Cap Take 1 capsule twice weekly for 3 weeks then weekly    estradioL (ESTRACE) 0.01 % (0.1 mg/gram) vaginal cream Place 1 g vaginally twice a week.    fenofibrate (TRICOR) 145 MG tablet Take 1 tablet (145 mg total) by mouth once daily.    flash glucose scanning reader (FREESTYLE AMY 2 READER) Misc Use as directed to check blood sugar    fluticasone propionate (FLONASE) 50 mcg/actuation nasal spray 2 sprays (100 mcg total) by Each Nare route once daily.    frovatriptan (FROVA) 2.5 MG tablet Take 1 tablet at onset of acute migraine. May take 1 more tablet 2 hours later if needed. (MAX 2 TABLET PER DAY. MAX 4 TABLETS PER WEEK.) " "   furosemide (LASIX) 20 MG tablet Take 1 tablet (20 mg total) by mouth once daily.    glucagon (BAQSIMI) 3 mg/actuation Spry 1 spray by Nasal route as needed (hypoglycemia). For emergency use. May repeat 1 time after 15 minutes    lamoTRIgine (LAMICTAL) 150 MG Tab Take 1 tablet (150 mg total) by mouth every morning.    lancets (ONETOUCH DELICA PLUS LANCET) 30 gauge Misc Use as directed 3 times daily    levocetirizine (XYZAL) 5 MG tablet Take 1 tablet (5 mg total) by mouth every evening.    LIDOcaine-prilocaine (EMLA) cream Apply topically up to 4 times per day to affected area for pain relief    linaCLOtide (LINZESS) 290 mcg Cap capsule Take 1 capsule (290 mcg total) by mouth before breakfast.    lurasidone (LATUDA) 80 mg Tab tablet Take 1 tablet (80 mg total) by mouth once daily.    magnesium oxide (MAG-OX) 400 mg (241.3 mg magnesium) tablet Take 1 tablet (400 mg total) by mouth once daily.    metFORMIN (GLUCOPHAGE) 1000 MG tablet Take 1 tablet (1,000 mg total) by mouth 2 (two) times daily with meals.    metFORMIN (GLUCOPHAGE) 1000 MG tablet Take 1 tablet (1,000 mg total) by mouth 2 (two) times daily with meals.    metoprolol succinate (TOPROL-XL) 50 MG 24 hr tablet Take 1 tablet (50 mg total) by mouth every evening.    omeprazole (PRILOSEC) 40 MG capsule Take 1 capsule (40 mg total) by mouth once daily.    pen needle, diabetic (BD ULTRA-FINE MINI PEN NEEDLE) 31 gauge x 3/16" Ndle Use one needle 5 times a day    pen needle, diabetic (BD ULTRA-FINE MINI PEN NEEDLE) 31 gauge x 3/16" Ndle Use 5 a day    pneumoc 20-tatiana conj-dip cr,PF, (PREVNAR-20, PF,) 0.5 mL Syrg injection Inject 0.5 mLs into the muscle.    polyethylene glycol (GLYCOLAX) 17 gram PwPk Take 1 packet (17 g) by mouth 2 (two) times daily.    promethazine (PHENERGAN) 12.5 MG Tab     prucalopride (MOTEGRITY) 2 mg Tab Take 1 tablet (2 mg) by mouth once daily.    semaglutide (OZEMPIC) 2 mg/dose (8 mg/3 mL) PnElis Inject 2 mg into the skin every 7 days.    " spironolactone (ALDACTONE) 25 MG tablet Take 1 tablet (25 mg total) by mouth once daily.    tiZANidine (ZANAFLEX) 4 MG tablet Take 2 tablets by mouth every 6 hours as needed    TOUJEO MAX U-300 SOLOSTAR 300 unit/mL (3 mL) insulin pen Inject 50 Units into the skin once daily.   Last reviewed on 8/10/2023  8:20 AM by Mariam Young, PhD, MPAP    Review of patient's allergies indicates:   Allergen Reactions    Codeine Itching    Hydromorphone Other (See Comments)     Can't wake up for long time if taken  Slow to wake up after surgery after receiving    Aleve [naproxen sodium]     Lyrica [pregabalin] Itching    Motrin [ibuprofen]     Neuromuscular blockers, steroidal Hives     some    Latex, natural rubber Rash    Morphine Rash     itching    Norco [hydrocodone-acetaminophen] Itching, Rash and Hallucinations    Seconal [secobarbital sodium] Rash     itching    Tylox [oxycodone-acetaminophen] Rash    Last reviewed on  8/10/2023 8:09 AM by Mariam Young      Tasks added this encounter   No tasks added.   Tasks due within next 3 months   No tasks due.     [facility]  [address] [facility] [address]  Phone: [phone]  Fax: [phone]

## 2023-08-11 NOTE — TELEPHONE ENCOUNTER
Returned call to pat    Vilas like her heart was slow--couldn't fill her pulse; felt like she would die in her sleep; just didn't feel right    She got up during the night to take her BP and was worried that she wouldn't wake up; felt tired this morning (got up around 6 something).    Had taken Lasix, spironolactone, linzess, metformin, cymbalta, lamictal, tizanidine, Latuda, metoprolol, doxepin, lipitor, fenofibrate, amlodipine/valsartan    No Xanax yesterday at all    BP last night was 102/68, heartrate was 70    Tonight--don't take doxepin; try again tomorrow night--if feels the same, will stop    Had considered switching her metoprolol to mornings but she said that she needed it at night because she was having high BP during the nights    Will discuss again on Monday

## 2023-08-11 NOTE — TELEPHONE ENCOUNTER
Patient called this morning, stating that the medication the provider prescribed on yesterday, 8/10/23 at her visit. Nurse asked patient what is the name of the medication. Patient stated Doxepin. She stated that she took the medication last night and the medication made her heart rate and pulse slow down and she felt like she was going to stop breathing.

## 2023-08-11 NOTE — TELEPHONE ENCOUNTER
SW contacted the patient 15 minutes after the scheduled appointment to inquire if there were any trouble joining the virtual visit.

## 2023-08-11 NOTE — TELEPHONE ENCOUNTER
Pt reports that she is on Xanax, and has changes in latuda and Cymbalta. Pt has no questions for RPH.

## 2023-08-11 NOTE — PROGRESS NOTES
Department of Psychiatry  Case Management Follow-Up      The patient location is: Residence  The chief complaint leading to consultation is: Intensive Outpatient Therapy Recommended  Visit type: audiovisual  60 minutes of total time spent on the encounter, which includes face to face time and non-face to face time preparing to see the patient (eg, review of referral), Obtaining and/or reviewing separately obtained history, Documenting information in the electronic or other health record, Independently interpreting results of research (not separately reported) and communicating results to the patient/family/caregiver.  Each patient to whom he or she provides medical services by telemedicine is:  (1) informed of the relationship between the physician and patient and the respective role of any other health care provider with respect to management of the patient; and (2) notified that he or she may decline to receive medical services by telemedicine and may withdraw from such care at any time.      Patient Name and   Yolanda Betts, 1970    Referring Provider  Dr. Mariam Young    Diagnosis  1. Need for case management follow-up        Notes    KALEB met with the patient via telehealth on 2023 to follow up after Pt was seen by the psychiatric physician. SW explained role and reviewed the referral.      The patient agreed with the referral to case management. KALEB informed the patient about the details of enrollment and service provided by an intensive outpatient program. KALEB explored options for IOP along with the patient. Pt expressed her interest in enrollment at [facility]. KALEB informed the patient about the agency's IOP and provided the patient with the agency's contact details. The patient expressed understanding and no additional assistance was necessary at this time. KALEB sent a referral via fax to the agency. KALEB will continue to remain available.     Resources  [facility]   Address: [address] [place] [address]  Phone:  [phone]      Total Time  60 minutes

## 2023-08-14 ENCOUNTER — TELEPHONE (OUTPATIENT)
Dept: PSYCHIATRY | Facility: CLINIC | Age: 53
End: 2023-08-14
Payer: MEDICAID

## 2023-08-14 ENCOUNTER — PATIENT MESSAGE (OUTPATIENT)
Dept: PSYCHIATRY | Facility: CLINIC | Age: 53
End: 2023-08-14
Payer: MEDICAID

## 2023-08-14 DIAGNOSIS — G47.00 INSOMNIA, UNSPECIFIED TYPE: Primary | ICD-10-CM

## 2023-08-14 RX ORDER — ZOLPIDEM TARTRATE 6.25 MG/1
6.25 TABLET, FILM COATED, EXTENDED RELEASE ORAL NIGHTLY PRN
Qty: 30 TABLET | Refills: 1 | Status: SHIPPED | OUTPATIENT
Start: 2023-08-14 | End: 2023-09-06

## 2023-08-14 NOTE — TELEPHONE ENCOUNTER
"Called Yolanda to discuss the sleep medicine again--    Doxepin 10 mg makes her heart rate slower--worried that she won't wake up    When she does not take it, it takes her 2-3 hours to fall asleep. If she does not take anything to sleep, when she is finally asleep, she only gets 4 hours.    Xanax helps her fall asleep but she only gets 3-4 hours and is awake.    She currently takes Xanax XR at 12 noon because it's the XR.    Seroquel made her blood sugar increase;     Panic attacks--can't breathe, heavy chest; lasts 1-2 hours to all day--"taxing on my body"    Triggers for her panic attacks--"financial, not working, family-related issues"    Sleep consult--2 years ago; has oxygen    We talked about accidental overdose; she is not able to fill the Xanax 1 mg yet because she has Xanax 2 mg--concerned about taking Ambien on top of that--will take Xanax XR no later than noon    Plan--continue Lamictal 150 mg; decrease Latuda 80 mg; decrease Cymbalta 30 mg am and 60 mg hs; change to Xanax 1 mg bid (or Xanax XR 2 mg in am until can fill Xanax); Ambien   "

## 2023-08-15 ENCOUNTER — TELEPHONE (OUTPATIENT)
Dept: PSYCHIATRY | Facility: CLINIC | Age: 53
End: 2023-08-15
Payer: MEDICAID

## 2023-08-15 ENCOUNTER — TELEPHONE (OUTPATIENT)
Dept: DIABETES | Facility: CLINIC | Age: 53
End: 2023-08-15
Payer: MEDICAID

## 2023-08-15 NOTE — TELEPHONE ENCOUNTER
Per Elizabeth, advised patient to stop the Toujeo, spoke with patient, advised to stop Toujeo, patient verbalized understanding. Blood sugar is currently 89. Patient advised to call if blood sugars start going up once the Toujeo is stopped

## 2023-08-15 NOTE — TELEPHONE ENCOUNTER
----- Message from Shelbie Mckeon sent at 8/15/2023  8:14 AM CDT -----  Contact: pt  Type: Needs Medical Advice    Who Called:Pt  Best Call Back Number:[phone]    Additional Information Requesting a call back regarding Pt was calling to speak with nurse wanted to report that her insulin level was in the 40's all night pt stated to please call when available Thank you  Please Advise-Thank you

## 2023-08-15 NOTE — TELEPHONE ENCOUNTER
SW contacted the patient and obtained a verbal consent to send referral to [facility] for IOP. Pt stated that she was still having issues uploading DONTAE in MyChart. Pt will sign form at upcoming appt. SW will continue to remain available.  
Intact

## 2023-08-16 ENCOUNTER — TELEPHONE (OUTPATIENT)
Dept: RHEUMATOLOGY | Facility: CLINIC | Age: 53
End: 2023-08-16
Payer: MEDICAID

## 2023-08-16 ENCOUNTER — NURSE TRIAGE (OUTPATIENT)
Dept: ADMINISTRATIVE | Facility: CLINIC | Age: 53
End: 2023-08-16
Payer: MEDICAID

## 2023-08-16 DIAGNOSIS — R79.82 ELEVATED C-REACTIVE PROTEIN (CRP): ICD-10-CM

## 2023-08-16 DIAGNOSIS — M47.819 SPONDYLOARTHROPATHY: Primary | ICD-10-CM

## 2023-08-16 RX ORDER — PREDNISONE 10 MG/1
10 TABLET ORAL DAILY
Qty: 30 TABLET | Refills: 0 | Status: ON HOLD | OUTPATIENT
Start: 2023-08-16 | End: 2023-12-18 | Stop reason: HOSPADM

## 2023-08-16 NOTE — TELEPHONE ENCOUNTER
"Pt reports severe neck and back pain, leg pain, and a headache, takes Humira, tizanizine, tylenol 1000 mg this morning 8am but continues to be in pain. Pt advised to go to the ED now per protocol, but pt refuses dispo, stating she doesn't have any transportation and is wanting to speak to an on call MD. Call placed to [facility], Dr. Jackson, who was warm transferred to speak to pt directly at this time.    Reason for Disposition   [1] Stiff neck (can't put chin to chest) AND [2] headache    Additional Information   Negative: Shock suspected (e.g., cold/pale/clammy skin, too weak to stand, low BP, rapid pulse)   Negative: Difficult to awaken or acting confused (e.g., disoriented, slurred speech)   Negative: [1] Similar pain previously AND [2] it was from "heart attack"   Negative: [1] Similar pain previously AND [2] it was from "angina" AND [3] not relieved by nitroglycerin   Negative: Sounds like a life-threatening emergency to the triager   Negative: Difficulty breathing or unusual sweating (e.g., sweating without exertion)    Protocols used: Neck Pain or [id number]    "

## 2023-08-16 NOTE — PROGRESS NOTES
Received a phone call from on-call nurse that patient is in severe pain in her cervical and lumbar region with severe pain all over the body.  Reviewed chart notes.  Since she responded to prednisone in the past, sent another course of 10 mg once daily to her pharmacy and advised the patient to call us tomorrow to talk to .  She voiced understanding.

## 2023-08-16 NOTE — TELEPHONE ENCOUNTER
----- Message from Margarita Jose sent at 8/16/2023  4:39 PM CDT -----  Contact: Yolanda Mathew is calling in regards to getting a call back. Pt stated that it is urgent and stated being in severe pain. Please call back at [phone]                              Thanks  KT

## 2023-08-17 ENCOUNTER — TELEPHONE (OUTPATIENT)
Dept: DIABETES | Facility: CLINIC | Age: 53
End: 2023-08-17
Payer: MEDICAID

## 2023-08-17 ENCOUNTER — PATIENT MESSAGE (OUTPATIENT)
Dept: DIABETES | Facility: CLINIC | Age: 53
End: 2023-08-17
Payer: MEDICAID

## 2023-08-17 ENCOUNTER — TELEPHONE (OUTPATIENT)
Dept: RHEUMATOLOGY | Facility: CLINIC | Age: 53
End: 2023-08-17
Payer: MEDICAID

## 2023-08-17 ENCOUNTER — DOCUMENTATION ONLY (OUTPATIENT)
Dept: PSYCHIATRY | Facility: CLINIC | Age: 53
End: 2023-08-17
Payer: MEDICAID

## 2023-08-17 ENCOUNTER — LAB VISIT (OUTPATIENT)
Dept: LAB | Facility: HOSPITAL | Age: 53
End: 2023-08-17
Attending: FAMILY MEDICINE
Payer: MEDICAID

## 2023-08-17 DIAGNOSIS — Z79.4 TYPE 2 DIABETES MELLITUS WITH HYPERGLYCEMIA, WITH LONG-TERM CURRENT USE OF INSULIN: Primary | ICD-10-CM

## 2023-08-17 DIAGNOSIS — M79.7 FIBROMYALGIA: ICD-10-CM

## 2023-08-17 DIAGNOSIS — E11.65 TYPE 2 DIABETES MELLITUS WITH HYPERGLYCEMIA, WITH LONG-TERM CURRENT USE OF INSULIN: Primary | ICD-10-CM

## 2023-08-17 DIAGNOSIS — M47.819 SPONDYLOARTHROPATHY: Primary | ICD-10-CM

## 2023-08-17 DIAGNOSIS — Z79.620 ADALIMUMAB (HUMIRA) LONG-TERM USE: ICD-10-CM

## 2023-08-17 DIAGNOSIS — M47.819 SPONDYLOARTHROPATHY: ICD-10-CM

## 2023-08-17 LAB
ALBUMIN SERPL BCP-MCNC: 4.4 G/DL (ref 3.5–5.2)
ALP SERPL-CCNC: 52 U/L (ref 55–135)
ALT SERPL W/O P-5'-P-CCNC: 10 U/L (ref 10–44)
ANION GAP SERPL CALC-SCNC: 11 MMOL/L (ref 8–16)
AST SERPL-CCNC: 13 U/L (ref 10–40)
BASOPHILS # BLD AUTO: 0.1 K/UL (ref 0–0.2)
BASOPHILS NFR BLD: 0.8 % (ref 0–1.9)
BILIRUB SERPL-MCNC: 0.2 MG/DL (ref 0.1–1)
BUN SERPL-MCNC: 18 MG/DL (ref 6–20)
CALCIUM SERPL-MCNC: 10.3 MG/DL (ref 8.7–10.5)
CHLORIDE SERPL-SCNC: 106 MMOL/L (ref 95–110)
CO2 SERPL-SCNC: 23 MMOL/L (ref 23–29)
CREAT SERPL-MCNC: 1.1 MG/DL (ref 0.5–1.4)
CRP SERPL-MCNC: 3.9 MG/L (ref 0–8.2)
DIFFERENTIAL METHOD: ABNORMAL
EOSINOPHIL # BLD AUTO: 0.4 K/UL (ref 0–0.5)
EOSINOPHIL NFR BLD: 3.1 % (ref 0–8)
ERYTHROCYTE [DISTWIDTH] IN BLOOD BY AUTOMATED COUNT: 15.4 % (ref 11.5–14.5)
ERYTHROCYTE [SEDIMENTATION RATE] IN BLOOD BY PHOTOMETRIC METHOD: 15 MM/HR (ref 0–36)
EST. GFR  (NO RACE VARIABLE): >60 ML/MIN/1.73 M^2
GLUCOSE SERPL-MCNC: 152 MG/DL (ref 70–110)
HCT VFR BLD AUTO: 37.6 % (ref 37–48.5)
HGB BLD-MCNC: 11.6 G/DL (ref 12–16)
IMM GRANULOCYTES # BLD AUTO: 0.04 K/UL (ref 0–0.04)
IMM GRANULOCYTES NFR BLD AUTO: 0.3 % (ref 0–0.5)
LYMPHOCYTES # BLD AUTO: 5.6 K/UL (ref 1–4.8)
LYMPHOCYTES NFR BLD: 45.2 % (ref 18–48)
MCH RBC QN AUTO: 26.2 PG (ref 27–31)
MCHC RBC AUTO-ENTMCNC: 30.9 G/DL (ref 32–36)
MCV RBC AUTO: 85 FL (ref 82–98)
MONOCYTES # BLD AUTO: 0.6 K/UL (ref 0.3–1)
MONOCYTES NFR BLD: 4.5 % (ref 4–15)
NEUTROPHILS # BLD AUTO: 5.7 K/UL (ref 1.8–7.7)
NEUTROPHILS NFR BLD: 46.1 % (ref 38–73)
NRBC BLD-RTO: 0 /100 WBC
PLATELET # BLD AUTO: 540 K/UL (ref 150–450)
PMV BLD AUTO: 8.9 FL (ref 9.2–12.9)
POTASSIUM SERPL-SCNC: 4.6 MMOL/L (ref 3.5–5.1)
PROT SERPL-MCNC: 7.8 G/DL (ref 6–8.4)
RBC # BLD AUTO: 4.42 M/UL (ref 4–5.4)
SODIUM SERPL-SCNC: 140 MMOL/L (ref 136–145)
WBC # BLD AUTO: 12.27 K/UL (ref 3.9–12.7)

## 2023-08-17 PROCEDURE — 86038 ANTINUCLEAR ANTIBODIES: CPT | Performed by: PHYSICIAN ASSISTANT

## 2023-08-17 PROCEDURE — 80053 COMPREHEN METABOLIC PANEL: CPT | Performed by: PHYSICIAN ASSISTANT

## 2023-08-17 PROCEDURE — 86140 C-REACTIVE PROTEIN: CPT | Performed by: PHYSICIAN ASSISTANT

## 2023-08-17 PROCEDURE — 86235 NUCLEAR ANTIGEN ANTIBODY: CPT | Mod: 59 | Performed by: PHYSICIAN ASSISTANT

## 2023-08-17 PROCEDURE — 83520 IMMUNOASSAY QUANT NOS NONAB: CPT | Mod: 59 | Performed by: PHYSICIAN ASSISTANT

## 2023-08-17 PROCEDURE — 85025 COMPLETE CBC W/AUTO DIFF WBC: CPT | Performed by: PHYSICIAN ASSISTANT

## 2023-08-17 PROCEDURE — 85652 RBC SED RATE AUTOMATED: CPT | Performed by: PHYSICIAN ASSISTANT

## 2023-08-17 PROCEDURE — 86039 ANTINUCLEAR ANTIBODIES (ANA): CPT | Performed by: PHYSICIAN ASSISTANT

## 2023-08-17 PROCEDURE — 80145 DRUG ASSAY ADALIMUMAB: CPT | Performed by: PHYSICIAN ASSISTANT

## 2023-08-17 PROCEDURE — 83516 IMMUNOASSAY NONANTIBODY: CPT | Performed by: PHYSICIAN ASSISTANT

## 2023-08-17 RX ORDER — INSULIN ASPART 100 [IU]/ML
10 INJECTION, SOLUTION INTRAVENOUS; SUBCUTANEOUS
Qty: 9 ML | Refills: 2 | Status: SHIPPED | OUTPATIENT
Start: 2023-08-17 | End: 2023-10-25 | Stop reason: SDUPTHER

## 2023-08-17 NOTE — TELEPHONE ENCOUNTER
Spoke to patient.  She has not yet resumed prednisone.  She is concerned about glycemic control.  Last A1c was within good range.  Recommend she take prednisone 10 mg daily for her acute flare.  Additionally would ask her to come to the [facility] to get labs today.  I will get normal Reg 4 labs in addition to Humira drug levels, anti histone, and a repeat ISAAC to evaluate for any evidence of drug-induced lupus.  Patient verbalized understanding and appreciated the call.

## 2023-08-17 NOTE — TELEPHONE ENCOUNTER
Patient called and stated that she is having severe pain. She is currently on Humira and she takes the dose every 14 days. She is experiencing fatigue, muscle cramps, drowsiness and her pain goes into her neck through her shoulders and down her spine. Patient is also take a muscle relaxer tizanidine but it is not helping.     Please advise

## 2023-08-17 NOTE — TELEPHONE ENCOUNTER
Pt contacted, she will be starting Prednisone 10 mg for ~ 1 month. Concerned about BG. Toujeo was stopped in the last 1-2 weeks due to low BG. I explained that Novolog ss may be beneficial while on Prednisone. She understands the hypoglycemia protocol.       If blood pre-meal sugar is 151-200 take +2 units of Novolog;  If blood pre-meal sugar is 201-250 take +4 units of Novolog;  If blood pre-meal sugar is 251-300 take +6 units of Novolog;  If blood pre-meal sugar is 301-350 take +8 units of Novolog;  If blood pre-meal sugar is 351-400+ take +10 units of Novolog;    Diabetes Medications               glucagon (BAQSIMI) 3 mg/actuation Spry 1 spray by Nasal route as needed (hypoglycemia). For emergency use. May repeat 1 time after 15 minutes    metFORMIN (GLUCOPHAGE) 1000 MG tablet Take 1 tablet (1,000 mg total) by mouth 2 (two) times daily with meals.         semaglutide (OZEMPIC) 2 mg/dose (8 mg/3 mL) PnElis Inject 2 mg into the skin every 7 days.

## 2023-08-17 NOTE — PROGRESS NOTES
On 08/17/23, KALEB followed up on the patient's referral at [facility] and was informed that they are no longer in network with the patient's insurer. KALEB called the patient and advised her that [facility] is an alternate option that accepts her insurance. The patient verbally agreed to have a referral submitted to the [facility]. KALEB will follow up and continue to remain available.

## 2023-08-17 NOTE — TELEPHONE ENCOUNTER
----- Message from Teddy Matta sent at 8/17/2023  1:05 PM CDT -----  Contact: Yolanda Guerrero is needing a call back in regards to her rheumatologist putting her on a medication. She is concerned about her diabetes. Please give her a call back at [phone]

## 2023-08-18 LAB
ANA PATTERN 1: NORMAL
ANA SER QL IF: POSITIVE
ANA TITR SER IF: NORMAL {TITER}

## 2023-08-21 ENCOUNTER — PATIENT MESSAGE (OUTPATIENT)
Dept: PSYCHIATRY | Facility: CLINIC | Age: 53
End: 2023-08-21
Payer: MEDICAID

## 2023-08-21 ENCOUNTER — TELEPHONE (OUTPATIENT)
Dept: DIABETES | Facility: CLINIC | Age: 53
End: 2023-08-21
Payer: MEDICAID

## 2023-08-21 ENCOUNTER — TELEPHONE (OUTPATIENT)
Dept: PHARMACY | Facility: CLINIC | Age: 53
End: 2023-08-21
Payer: MEDICAID

## 2023-08-21 LAB
ADALIMUMAB SERPL IA-MCNC: 3.3 MCG/ML
ANTI SM ANTIBODY: 0.12 RATIO (ref 0–0.99)
ANTI SM/RNP ANTIBODY: 0.06 RATIO (ref 0–0.99)
ANTI-SM INTERPRETATION: NEGATIVE
ANTI-SM/RNP INTERPRETATION: NEGATIVE
ANTI-SSA ANTIBODY: 0.07 RATIO (ref 0–0.99)
ANTI-SSA INTERPRETATION: NEGATIVE
ANTI-SSB ANTIBODY: 0.06 RATIO (ref 0–0.99)
ANTI-SSB INTERPRETATION: NEGATIVE
DSDNA AB SER-ACNC: NORMAL [IU]/ML
HISTONE IGG SER IA-ACNC: 0.3 UNITS (ref 0–0.9)

## 2023-08-21 NOTE — TELEPHONE ENCOUNTER
----- Message from Mely Mariscal MA sent at 8/21/2023  2:23 PM CDT -----  Contact: Yolanda  Please advise. Pt is stating she is having some concerns of her BG while on steroid , pt stated she is following recent sliding scale you sent to her portal but nothing is changing and she needs a new sliding scale.   ----- Message -----  From: Ira Rosales  Sent: 8/21/2023   2:18 PM CDT  To: Osmany Johnson Staff    Patient is calling in regards to questions and concerns. Reports blood sugar has been rising daily and is needing clinical advise on how to proceed with the issue. Please return call to [phone]       Telephone Encounter by Latasha Kay RN at 04/05/18 10:17 AM     Author:  Latasha Kay RN Service:  (none) Author Type:  Registered Nurse     Filed:  04/05/18 10:20 AM Encounter Date:  4/5/2018 Status:  Signed     :  Latasha Kay RN (Registered Nurse)            Spoke with patient's Dad who is asking for appointment to be scheduled for today.  Dad advised that patient is an inpatient at [facility] and due to injury occurring while at [facility] there is a limited window that patient can be seen before she is released.  Patient had xray taken at [facility].  I offered appointment with Dr Diaz for this afternoon at 3:15, [place].  I asked that patient arrive at 3:00pm.  Dad accepted appointment voiced understanding.  Call transferred to business office to register.[JB1.1M]  Close encounter.[JB1.1T]      Revision History        User Key Date/Time User Provider Type Action    > JB1.1 04/05/18 10:20 AM Latasha Kay RN Registered Nurse Sign    M - Manual, T - Template

## 2023-08-21 NOTE — TELEPHONE ENCOUNTER
----- Message from Mely Mariscal MA sent at 8/21/2023  2:53 PM CDT -----  Contact: Yolanda Waters.    ----- Message -----  From: Kathy Severino FNP  Sent: 8/21/2023   2:49 PM CDT  To: Mely Mariscal MA    Please pull recent clarity report.     ----- Message -----  From: Mely Mariscal MA  Sent: 8/21/2023   2:26 PM CDT  To: Elizabeth Ceron NP    Please advise. Pt is stating she is having some concerns of her BG while on steroid , pt stated she is following recent sliding scale you sent to her portal but nothing is changing and she needs a new sliding scale.   ----- Message -----  From: Ira Rosales  Sent: 8/21/2023   2:18 PM CDT  To: Osmany Johnson Staff    Patient is calling in regards to questions and concerns. Reports blood sugar has been rising daily and is needing clinical advise on how to proceed with the issue. Please return call to [phone]

## 2023-08-21 NOTE — TELEPHONE ENCOUNTER
Pt called regarding concerns.Pt is stating she is having some concerns of her BG while on steroid , pt stated she is following recent sliding scale provider sent to her portal but nothing is changing and she needs a new sliding scale. Provider has been informed and pt voiced understanding.  ----- Message from Ira Rosales sent at 8/21/2023  2:16 PM CDT -----  Contact: Yolanda  Patient is calling in regards to questions and concerns. Reports blood sugar has been rising daily and is needing clinical advise on how to proceed with the issue. Please return call to [phone]

## 2023-08-21 NOTE — TELEPHONE ENCOUNTER
Spoke with patient regarding post prandial spikes and novolog adjustment.           Instructed patient to time novolog 10-15 minutes before start of meal and to log in G6 gill.

## 2023-08-22 DIAGNOSIS — M47.819 SPONDYLOARTHROPATHY: Primary | ICD-10-CM

## 2023-08-22 LAB — ADALIMUMAB AB SERPL-ACNC: <10 AU/ML

## 2023-08-22 RX ORDER — ETANERCEPT 50 MG/ML
50 SOLUTION SUBCUTANEOUS WEEKLY
Qty: 4 ML | Refills: 4 | Status: ACTIVE | OUTPATIENT
Start: 2023-08-22 | End: 2023-09-01

## 2023-08-23 ENCOUNTER — PATIENT MESSAGE (OUTPATIENT)
Dept: PRIMARY CARE CLINIC | Facility: CLINIC | Age: 53
End: 2023-08-23
Payer: MEDICAID

## 2023-08-23 ENCOUNTER — NURSE TRIAGE (OUTPATIENT)
Dept: ADMINISTRATIVE | Facility: CLINIC | Age: 53
End: 2023-08-23
Payer: MEDICAID

## 2023-08-23 ENCOUNTER — PATIENT MESSAGE (OUTPATIENT)
Dept: RHEUMATOLOGY | Facility: CLINIC | Age: 53
End: 2023-08-23
Payer: MEDICAID

## 2023-08-24 ENCOUNTER — TELEPHONE (OUTPATIENT)
Dept: RHEUMATOLOGY | Facility: CLINIC | Age: 53
End: 2023-08-24
Payer: MEDICAID

## 2023-08-24 NOTE — TELEPHONE ENCOUNTER
Patient went to urgent care and she was negative for everything. Should she hold off on starting enbrel or go ahead and start?     Please advise (please see below messages for reference)

## 2023-08-24 NOTE — TELEPHONE ENCOUNTER
"Patient calling with multiple complaints. Patient c/o sore throat, ear congestion, HA and runny nose since yesterday. Patient reports main symptom is sore throat. Patient reports pain 6/10 and able to swallow fluids. Advised patient of dispo to see PCP within 24 hours. Verbalized understanding. Advised to call back if symptoms become worse or with further questions.      Reason for Disposition   Diabetes mellitus or weak immune system (e.g., HIV positive, cancer chemo, splenectomy, organ transplant, chronic steroids)    Additional Information   Negative: SEVERE difficulty breathing (e.g., struggling for each breath, speaks in single words, stridor)   Negative: Sounds like a life-threatening emergency to the triager   Negative: [1] Drooling or spitting out saliva (because can't swallow) AND [2] normal breathing   Negative: Unable to open mouth completely   Negative: [1] Difficulty breathing AND [2] not severe   Negative: Fever > 104 F (40 C)   Negative: [1] Refuses to drink anything AND [2] for > 12 hours   Negative: [1] Drinking very little AND [2] dehydration suspected (e.g., no urine > 12 hours, very dry mouth, very lightheaded)   Negative: Patient sounds very sick or weak to the triager   Negative: SEVERE (e.g., excruciating) throat pain   Negative: [1] Pus on tonsils (back of throat) AND [2]  fever AND [3] swollen neck lymph nodes ("glands")   Negative: [1] Rash AND [2] widespread (especially chest and abdomen)   Negative: Earache also present   Negative: Fever present > 3 days (72 hours)    Protocols used: Sore Throat-A-AH    "

## 2023-08-24 NOTE — TELEPHONE ENCOUNTER
Spoke with patient and she stated that she has not been feeling well. Informed patient that she needs to proceed to urgent care to get tested for any viral infections. Advised her to hold enbrel if positive for two weeks and if negative hold until she feels better. Patient verbalized understanding and was grateful for the call-DD

## 2023-08-24 NOTE — TELEPHONE ENCOUNTER
----- Message from Estevan Blackwell sent at 8/24/2023  9:51 AM CDT -----  Contact: patient  Yolanda Ventura would like a call back at [phone], in regards to the message she sent on her [facility] portal about her not feeling well. Pt would like a call back asap. She states that is urgent.

## 2023-08-29 ENCOUNTER — TELEPHONE (OUTPATIENT)
Dept: RHEUMATOLOGY | Facility: CLINIC | Age: 53
End: 2023-08-29
Payer: MEDICAID

## 2023-08-29 ENCOUNTER — TELEPHONE (OUTPATIENT)
Dept: PSYCHIATRY | Facility: CLINIC | Age: 53
End: 2023-08-29
Payer: MEDICAID

## 2023-08-29 NOTE — TELEPHONE ENCOUNTER
----- Message from Tania Pfeiffer sent at 8/29/2023  8:55 AM CDT -----  Contact: self [phone]  Patient called in this morning requesting a call back back to discuss occupational therapy and physical therapy and home health. Please call back [phone]. Thanks stephan

## 2023-08-29 NOTE — TELEPHONE ENCOUNTER
KALEB contacted the patient to follow up on IOP enrollment at [facility]. Patient stated that she had a scheduled call today with Samira(intake staff) for the intake assessment. KALEB will follow up and remain available.

## 2023-08-29 NOTE — TELEPHONE ENCOUNTER
""Any significant changes in symptoms?  Worsening weakness, numbness/tingling, etc?  If not, I can put an order in but needs f/u as scheduled "          Patient is very fatigued, no energy. She states that her back and neck hurts constantly.   "

## 2023-08-29 NOTE — TELEPHONE ENCOUNTER
Patient called in and wanted to know if you could order home health to come in and help her due to her not been able to have full mobility. She also wants PT/OT. No slots open on schedule to discuss.       Please advise

## 2023-08-30 DIAGNOSIS — M47.819 SPONDYLOARTHROPATHY: Primary | ICD-10-CM

## 2023-08-30 DIAGNOSIS — R53.1 WEAKNESS: ICD-10-CM

## 2023-08-30 NOTE — TELEPHONE ENCOUNTER
"Spoke with patient and informed her per Annia ,     "  Mohini Wyman PA-C  You 4 hours ago (10:23 AM)       I'll submit orders.  Pt should be off humira.  I sent in enbrel rx a week or so ago.  She can start it asap if she hasn't already.  Steroid taper in the mean time.  If not improving have her let us know.    Patient verbalized understanding and was grateful for the call-_DD  "

## 2023-09-01 ENCOUNTER — OFFICE VISIT (OUTPATIENT)
Dept: RHEUMATOLOGY | Facility: CLINIC | Age: 53
End: 2023-09-01
Payer: MEDICAID

## 2023-09-01 DIAGNOSIS — Z51.81 MEDICATION MONITORING ENCOUNTER: ICD-10-CM

## 2023-09-01 DIAGNOSIS — Z79.899 HIGH RISK MEDICATION USE: ICD-10-CM

## 2023-09-01 DIAGNOSIS — Z79.899 IMMUNOCOMPROMISED STATE DUE TO DRUG THERAPY: ICD-10-CM

## 2023-09-01 DIAGNOSIS — D84.821 IMMUNOCOMPROMISED STATE DUE TO DRUG THERAPY: ICD-10-CM

## 2023-09-01 DIAGNOSIS — M47.819 SPONDYLOARTHROPATHY: Primary | ICD-10-CM

## 2023-09-01 DIAGNOSIS — T50.905A ADVERSE EFFECT OF DRUG, INITIAL ENCOUNTER: ICD-10-CM

## 2023-09-01 PROCEDURE — 4010F ACE/ARB THERAPY RXD/TAKEN: CPT | Mod: CPTII,95,, | Performed by: PHYSICIAN ASSISTANT

## 2023-09-01 PROCEDURE — 3044F HG A1C LEVEL LT 7.0%: CPT | Mod: CPTII,95,, | Performed by: PHYSICIAN ASSISTANT

## 2023-09-01 PROCEDURE — 4010F PR ACE/ARB THEARPY RXD/TAKEN: ICD-10-PCS | Mod: CPTII,95,, | Performed by: PHYSICIAN ASSISTANT

## 2023-09-01 PROCEDURE — 99214 OFFICE O/P EST MOD 30 MIN: CPT | Mod: 95,,, | Performed by: PHYSICIAN ASSISTANT

## 2023-09-01 PROCEDURE — 3072F PR LOW RISK FOR RETINOPATHY: ICD-10-PCS | Mod: CPTII,95,, | Performed by: PHYSICIAN ASSISTANT

## 2023-09-01 PROCEDURE — 3044F PR MOST RECENT HEMOGLOBIN A1C LEVEL <7.0%: ICD-10-PCS | Mod: CPTII,95,, | Performed by: PHYSICIAN ASSISTANT

## 2023-09-01 PROCEDURE — 99214 PR OFFICE/OUTPT VISIT, EST, LEVL IV, 30-39 MIN: ICD-10-PCS | Mod: 95,,, | Performed by: PHYSICIAN ASSISTANT

## 2023-09-01 PROCEDURE — 3072F LOW RISK FOR RETINOPATHY: CPT | Mod: CPTII,95,, | Performed by: PHYSICIAN ASSISTANT

## 2023-09-01 RX ORDER — SECUKINUMAB 150 MG/ML
150 INJECTION SUBCUTANEOUS
Qty: 5 ML | Refills: 0 | Status: SHIPPED | OUTPATIENT
Start: 2023-09-01 | End: 2023-09-08 | Stop reason: CLARIF

## 2023-09-01 RX ORDER — SECUKINUMAB 150 MG/ML
300 INJECTION SUBCUTANEOUS
Qty: 1 ML | Refills: 6 | Status: SHIPPED | OUTPATIENT
Start: 2023-09-01 | End: 2023-09-08 | Stop reason: CLARIF

## 2023-09-01 NOTE — PROGRESS NOTES
Audio Only Telehealth Visit     The patient location is: home  The chief complaint leading to consultation is: f/u spondyloarthritis  Visit type: Virtual visit with audio only (telephone)  Total time spent with patient: 20 min     The reason for the audio only service rather than synchronous audio and video virtual visit was related to technical difficulties or patient preference/necessity.     Each patient to whom I provide medical services by telemedicine is:  (1) informed of the relationship between the physician and patient and the respective role of any other health care provider with respect to management of the patient; and (2) notified that they may decline to receive medical services by telemedicine and may withdraw from such care at any time. Patient verbally consented to receive this service via voice-only telephone call.       HPI:  Patient has been diagnosed with spondyloarthropathy.  I started her on Humira.  Unfortunately she started having significant reaction.  I discontinued it and started her on Enbrel.  She has taken 2 doses of Enbrel.  Now reporting severe dizziness for at least 5 days after injection.  She is complaining of decline in function due to dizziness in addition to worsening joint pain.  She is having trouble getting up and moving around.  She is requesting home health physical therapy.     Assessment and plan:    Axial spondyloarthritis  DC Enbrel due to side effects  Try Cosentyx 150 mg subcu  Discussed loading dose injections once weekly for 5 weeks before moving to injections every 28 days for maintenance  Discussed risks and benefits of the medication  Get updated labs prior to next follow-up  Prescription sent to [facility]  Literature made available through the after visit summary of today's visit  Weakness  Check CK and aldolase next week  PT thru [facility] due to functional decline caused by medication side effects and axial spondyloarthritis.  F/u as scheduled         This service was  not originating from a related E/M service provided within the previous 7 days nor will  to an E/M service or procedure within the next 24 hours or my soonest available appointment.  Prevailing standard of care was able to be met in this audio-only visit.

## 2023-09-05 ENCOUNTER — TELEPHONE (OUTPATIENT)
Dept: PSYCHIATRY | Facility: CLINIC | Age: 53
End: 2023-09-05
Payer: MEDICAID

## 2023-09-05 NOTE — TELEPHONE ENCOUNTER
----- Message from Arnel Coburn sent at 9/5/2023  2:54 PM CDT -----  Contact: Yolanda Marshall is requesting a call in regards to her medication. This is all the info she provided. Please give patient a call [phone]

## 2023-09-06 ENCOUNTER — TELEPHONE (OUTPATIENT)
Dept: PSYCHIATRY | Facility: CLINIC | Age: 53
End: 2023-09-06
Payer: MEDICAID

## 2023-09-06 DIAGNOSIS — F41.1 GENERALIZED ANXIETY DISORDER: Chronic | ICD-10-CM

## 2023-09-06 DIAGNOSIS — G47.00 INSOMNIA, UNSPECIFIED TYPE: Primary | ICD-10-CM

## 2023-09-06 RX ORDER — ZOLPIDEM TARTRATE 10 MG/1
10 TABLET ORAL NIGHTLY
Qty: 30 TABLET | Refills: 1 | Status: SHIPPED | OUTPATIENT
Start: 2023-09-06 | End: 2023-09-26

## 2023-09-06 RX ORDER — ALPRAZOLAM 1 MG/1
1 TABLET ORAL 2 TIMES DAILY PRN
Qty: 60 TABLET | Refills: 1 | Status: SHIPPED | OUTPATIENT
Start: 2023-09-06 | End: 2023-11-01 | Stop reason: SDUPTHER

## 2023-09-08 RX ORDER — SECUKINUMAB 150 MG/ML
INJECTION SUBCUTANEOUS
Qty: 5 ML | Refills: 0 | Status: ACTIVE | OUTPATIENT
Start: 2023-09-08 | End: 2023-10-09

## 2023-09-12 ENCOUNTER — OFFICE VISIT (OUTPATIENT)
Dept: PRIMARY CARE CLINIC | Facility: CLINIC | Age: 53
End: 2023-09-12
Payer: MEDICAID

## 2023-09-12 VITALS
WEIGHT: 168.19 LBS | HEIGHT: 56 IN | BODY MASS INDEX: 37.83 KG/M2 | DIASTOLIC BLOOD PRESSURE: 78 MMHG | SYSTOLIC BLOOD PRESSURE: 114 MMHG | TEMPERATURE: 98 F | OXYGEN SATURATION: 97 % | HEART RATE: 84 BPM

## 2023-09-12 DIAGNOSIS — M47.819 SPONDYLOARTHRITIS: ICD-10-CM

## 2023-09-12 DIAGNOSIS — Z79.4 TYPE 2 DIABETES MELLITUS WITH HYPERGLYCEMIA, WITH LONG-TERM CURRENT USE OF INSULIN: Primary | ICD-10-CM

## 2023-09-12 DIAGNOSIS — E78.2 MIXED HYPERLIPIDEMIA: ICD-10-CM

## 2023-09-12 DIAGNOSIS — E66.9 CLASS 2 OBESITY WITH BODY MASS INDEX (BMI) OF 37.0 TO 37.9 IN ADULT, UNSPECIFIED OBESITY TYPE, UNSPECIFIED WHETHER SERIOUS COMORBIDITY PRESENT: ICD-10-CM

## 2023-09-12 DIAGNOSIS — I10 ESSENTIAL HYPERTENSION: ICD-10-CM

## 2023-09-12 DIAGNOSIS — E11.65 TYPE 2 DIABETES MELLITUS WITH HYPERGLYCEMIA, WITH LONG-TERM CURRENT USE OF INSULIN: Primary | ICD-10-CM

## 2023-09-12 DIAGNOSIS — F31.81 BIPOLAR 2 DISORDER: ICD-10-CM

## 2023-09-12 PROCEDURE — 3078F PR MOST RECENT DIASTOLIC BLOOD PRESSURE < 80 MM HG: ICD-10-PCS | Mod: CPTII,,, | Performed by: FAMILY MEDICINE

## 2023-09-12 PROCEDURE — 4010F PR ACE/ARB THEARPY RXD/TAKEN: ICD-10-PCS | Mod: CPTII,,, | Performed by: FAMILY MEDICINE

## 2023-09-12 PROCEDURE — 3078F DIAST BP <80 MM HG: CPT | Mod: CPTII,,, | Performed by: FAMILY MEDICINE

## 2023-09-12 PROCEDURE — 1159F PR MEDICATION LIST DOCUMENTED IN MEDICAL RECORD: ICD-10-PCS | Mod: CPTII,,, | Performed by: FAMILY MEDICINE

## 2023-09-12 PROCEDURE — 3072F LOW RISK FOR RETINOPATHY: CPT | Mod: CPTII,,, | Performed by: FAMILY MEDICINE

## 2023-09-12 PROCEDURE — 4010F ACE/ARB THERAPY RXD/TAKEN: CPT | Mod: CPTII,,, | Performed by: FAMILY MEDICINE

## 2023-09-12 PROCEDURE — 1159F MED LIST DOCD IN RCRD: CPT | Mod: CPTII,,, | Performed by: FAMILY MEDICINE

## 2023-09-12 PROCEDURE — 99999 PR PBB SHADOW E&M-EST. PATIENT-LVL V: CPT | Mod: PBBFAC,,, | Performed by: FAMILY MEDICINE

## 2023-09-12 PROCEDURE — 99214 OFFICE O/P EST MOD 30 MIN: CPT | Mod: S$PBB,,, | Performed by: FAMILY MEDICINE

## 2023-09-12 PROCEDURE — 3008F BODY MASS INDEX DOCD: CPT | Mod: CPTII,,, | Performed by: FAMILY MEDICINE

## 2023-09-12 PROCEDURE — 3044F HG A1C LEVEL LT 7.0%: CPT | Mod: CPTII,,, | Performed by: FAMILY MEDICINE

## 2023-09-12 PROCEDURE — 99215 OFFICE O/P EST HI 40 MIN: CPT | Mod: PBBFAC,PN | Performed by: FAMILY MEDICINE

## 2023-09-12 PROCEDURE — 99999 PR PBB SHADOW E&M-EST. PATIENT-LVL V: ICD-10-PCS | Mod: PBBFAC,,, | Performed by: FAMILY MEDICINE

## 2023-09-12 PROCEDURE — 3044F PR MOST RECENT HEMOGLOBIN A1C LEVEL <7.0%: ICD-10-PCS | Mod: CPTII,,, | Performed by: FAMILY MEDICINE

## 2023-09-12 PROCEDURE — 3008F PR BODY MASS INDEX (BMI) DOCUMENTED: ICD-10-PCS | Mod: CPTII,,, | Performed by: FAMILY MEDICINE

## 2023-09-12 PROCEDURE — 3074F PR MOST RECENT SYSTOLIC BLOOD PRESSURE < 130 MM HG: ICD-10-PCS | Mod: CPTII,,, | Performed by: FAMILY MEDICINE

## 2023-09-12 PROCEDURE — 99214 PR OFFICE/OUTPT VISIT, EST, LEVL IV, 30-39 MIN: ICD-10-PCS | Mod: S$PBB,,, | Performed by: FAMILY MEDICINE

## 2023-09-12 PROCEDURE — 3074F SYST BP LT 130 MM HG: CPT | Mod: CPTII,,, | Performed by: FAMILY MEDICINE

## 2023-09-12 PROCEDURE — 3072F PR LOW RISK FOR RETINOPATHY: ICD-10-PCS | Mod: CPTII,,, | Performed by: FAMILY MEDICINE

## 2023-09-12 NOTE — PROGRESS NOTES
"Subjective:       Patient ID: Yolanda Betts is a 53 y.o. female.    Chief Complaint: Follow-up, Anxiety, and Diabetes Mellitus      History of Present Illness:   Yolanda Betts 53 y.o. female presents today with   She has Spondyloarthritis for which she sees Rheumatology, on chronic oral steroid-complaining of truncal obesity.  DM II: controlled and she is no longer on Tujeo. Only takes ozempic and metformin.   Bipolar depression: reports that she is doing well. No change in her meds since last visit.  Past Medical History:   Diagnosis Date    Abnormal Pap smear of cervix     HPV genital warts    Anemia     Anxiety     Arthritis     Asthma 10/11/2016    Bipolar 1 disorder     Diabetes mellitus, type 2     Dyslipidemia associated with type 2 diabetes mellitus 5/13/2019    General anesthetics causing adverse effect in therapeutic use     Genital warts     GERD (gastroesophageal reflux disease)     Herpes simplex virus (HSV) infection     Hyperlipidemia     Hypertension     Hypertension complicating diabetes 5/5/2019    Migraine with aura and without status migrainosus, not intractable 3/21/2016    Mild persistent asthma without complication 10/11/2016    Morbid obesity with body mass index (BMI) of 45.0 to 49.9 in adult 8/3/2017    Obstructive sleep apnea     ALEN (obstructive sleep apnea)     2L per N/C q HS    Schizoaffective disorder, bipolar type 4/25/2019    Seasonal allergic rhinitis due to pollen 5/13/2019    Type 2 diabetes mellitus with hyperglycemia, with long-term current use of insulin 12/21/2017    Type 2 diabetes mellitus with hyperglycemia, without long-term current use of insulin 12/21/2017     Family History   Problem Relation Age of Onset    Diabetes Mother     Hyperlipidemia Mother     Hypertension Mother     Asthma Mother     COPD Mother     Glaucoma Mother     Thyroid disease Mother     Anesthesia problems Mother         "almost had a cardiac arrest" , " blood clots    Hypertension Father     Hyperlipidemia Father     Cancer Father         Brain, lung, liver, kidney    Heart disease Maternal Grandmother     Hyperlipidemia Maternal Grandmother     Hypertension Maternal Grandmother     Cataracts Maternal Grandmother     Diabetes Maternal Grandmother     Heart disease Maternal Grandfather     Hyperlipidemia Maternal Grandfather     Hypertension Maternal Grandfather     Glaucoma Maternal Grandfather     Cancer Maternal Grandfather     Cataracts Maternal Grandfather     Macular degeneration Maternal Grandfather     Diabetes Maternal Grandfather     Heart disease Paternal Grandmother     Hyperlipidemia Paternal Grandmother     Hypertension Paternal Grandmother     Cataracts Paternal Grandmother     Heart disease Paternal Grandfather     Hyperlipidemia Paternal Grandfather     Hypertension Paternal Grandfather     Cataracts Paternal Grandfather     Breast cancer Maternal Cousin     Breast cancer Maternal Cousin     Breast cancer Maternal Cousin     Breast cancer Maternal Cousin      Social History     Socioeconomic History    Marital status: Single   Tobacco Use    Smoking status: Never    Smokeless tobacco: Never   Substance and Sexual Activity    Alcohol use: Yes     Alcohol/week: 0.0 standard drinks of alcohol     Comment: socially  No alcohol 72h prior to sx    Drug use: No    Sexual activity: Yes     Partners: Male     Birth control/protection: Surgical     Comment: hyst   Social History Narrative    Long-term care nurse     Social Determinants of Health     Financial Resource Strain: High Risk (2/1/2023)    Overall Financial Resource Strain (CARDIA)     Difficulty of Paying Living Expenses: Very hard   Food Insecurity: Food Insecurity Present (2/1/2023)    Hunger Vital Sign     Worried About Running Out of Food in the Last Year: Sometimes true     Ran Out of Food in the Last Year: Sometimes true   Transportation Needs: No  "Transportation Needs (2/1/2023)    PRAPARE - Transportation     Lack of Transportation (Medical): No     Lack of Transportation (Non-Medical): No   Physical Activity: Inactive (2/1/2023)    Exercise Vital Sign     Days of Exercise per Week: 0 days     Minutes of Exercise per Session: 0 min   Stress: Stress Concern Present (2/1/2023)    [employer] - Occupational Stress Questionnaire     Feeling of Stress : Very much   Social Connections: Moderately Isolated (2/1/2023)    Social Connection and Isolation Panel [NHANES]     Frequency of Communication with Friends and Family: More than three times a week     Frequency of Social Gatherings with Friends and Family: More than three times a week     Attends Sabianist Services: More than 4 times per year     Active Member of Clubs or Organizations: No     Attends Club or Organization Meetings: Never     Marital Status:    Housing Stability: High Risk (2/1/2023)    Housing Stability Vital Sign     Unable to Pay for Housing in the Last Year: Yes     Number of Places Lived in the Last Year: 1     Unstable Housing in the Last Year: No     Outpatient Encounter Medications as of 9/12/2023   Medication Sig Dispense Refill    ALPRAZolam (XANAX) 1 MG tablet Take 1 tablet (1 mg total) by mouth 2 (two) times daily as needed for Anxiety. 60 tablet 1    amlodipine-valsartan (EXFORGE)  mg per tablet Take 1 tablet by mouth once daily. 90 tablet 3    aspirin (ECOTRIN) 81 MG EC tablet Take 1 tablet by mouth daily 30 tablet 0    atorvastatin (LIPITOR) 20 MG tablet Take 1 tablet (20 mg total) by mouth every evening. 90 tablet 3    BD INSULIN SYRINGE ULTRA-FINE 1/2 mL 30 gauge x 1/2" Syrg   0    blood sugar diagnostic (ONETOUCH ULTRA TEST) Strp 1 each by Misc.(Non-Drug; Combo Route) route 4 (four) times daily. [facility] Glucose Test Strips 400 strip 3    blood sugar diagnostic (ONETOUCH ULTRA TEST) Strp Check blood glucose 4 times " daily as directed and as needed (dispense insurance preferred brand or patient choice) 200 each 5    budesonide-formoterol 160-4.5 mcg (SYMBICORT) 160-4.5 mcg/actuation HFAA Inhale 2 puffs into the lungs every 12 (twelve) hours. Controller : Use spacer 10.2 g 6    cycloSPORINE 0.05 % Drop Place 1 drop into both eyes 2 (two) times daily. 5.5 mL 11    DULoxetine (CYMBALTA) 30 MG capsule take 1 capsule by mouth every morning to replace the 60 mg in mornings 30 capsule 2    DULoxetine (CYMBALTA) 60 MG capsule Take 1 capsule by mouth twice daily 60 capsule 2    ergocalciferol (ERGOCALCIFEROL) 50,000 unit Cap Take 1 capsule twice weekly for 3 weeks then weekly 12 capsule 3    fenofibrate (TRICOR) 145 MG tablet Take 1 tablet (145 mg total) by mouth once daily. 90 tablet 3    flash glucose scanning reader (ZENTICKET AMY 2 READER) Misc Use as directed to check blood sugar 1 each 1    fluticasone propionate (FLONASE) 50 mcg/actuation nasal spray 2 sprays (100 mcg total) by Each Nare route once daily. 16 g 11    frovatriptan (FROVA) 2.5 MG tablet Take 1 tablet at onset of acute migraine. May take 1 more tablet 2 hours later if needed. (MAX 2 TABLET PER DAY. MAX 4 TABLETS PER WEEK.) 9 tablet 1    furosemide (LASIX) 20 MG tablet Take 1 tablet (20 mg total) by mouth once daily. 90 tablet 3    glucagon (BAQSIMI) 3 mg/actuation Spry 1 spray by Nasal route as needed (hypoglycemia). For emergency use. May repeat 1 time after 15 minutes 2 each 1    insulin aspart U-100 (NOVOLOG FLEXPEN U-100 INSULIN) 100 unit/mL (3 mL) InPn pen Inject 10 Units into the skin 3 (three) times daily before meals. Use per sliding scale up to 10 units 3 times a day before meals 9 mL 2    lamoTRIgine (LAMICTAL) 150 MG Tab Take 1 tablet (150 mg total) by mouth every morning. 30 tablet 2    lancets (ONETOUCH DELICA PLUS LANCET) 30 gauge Misc Use as directed 3 times daily 100 each 11    levocetirizine (XYZAL) 5 MG tablet Take 1 tablet (5 mg  "total) by mouth every evening. 30 tablet 11    linaCLOtide (LINZESS) 290 mcg Cap capsule Take 1 capsule (290 mcg total) by mouth before breakfast. 30 capsule 5    lurasidone (LATUDA) 80 mg Tab tablet Take 1 tablet (80 mg total) by mouth once daily. 30 tablet 2    magnesium oxide (MAG-OX) 400 mg (241.3 mg magnesium) tablet Take 1 tablet (400 mg total) by mouth once daily. 90 tablet 3    metFORMIN (GLUCOPHAGE) 1000 MG tablet Take 1 tablet (1,000 mg total) by mouth 2 (two) times daily with meals. 180 tablet 1    metoprolol succinate (TOPROL-XL) 50 MG 24 hr tablet Take 1 tablet (50 mg total) by mouth every evening. 30 tablet 11    omeprazole (PRILOSEC) 40 MG capsule Take 1 capsule (40 mg total) by mouth once daily. 30 capsule 11    pen needle, diabetic (BD ULTRA-FINE MINI PEN NEEDLE) 31 gauge x 3/16" Ndle Use one needle 5 times a day 200 each 11    pen needle, diabetic (BD ULTRA-FINE MINI PEN NEEDLE) 31 gauge x 3/16" Ndle Use 5 a day 200 each 3    polyethylene glycol (GLYCOLAX) 17 gram PwPk Take 1 packet (17 g) by mouth 2 (two) times daily. 180 each 0    predniSONE (DELTASONE) 10 MG tablet Take 1 tablet (10 mg total) by mouth once daily. 30 tablet 0    promethazine (PHENERGAN) 12.5 MG Tab       prucalopride (MOTEGRITY) 2 mg Tab Take 1 tablet (2 mg) by mouth once daily. 30 tablet 11    semaglutide (OZEMPIC) 2 mg/dose (8 mg/3 mL) PnIj Inject 2 mg into the skin every 7 days. 3 mL 5    spironolactone (ALDACTONE) 25 MG tablet Take 1 tablet (25 mg total) by mouth once daily. 90 tablet 3    tiZANidine (ZANAFLEX) 4 MG tablet Take 2 tablets by mouth every 6 hours as needed 240 tablet 0    zolpidem (AMBIEN) 10 mg Tab Take 1 tablet (10 mg total) by mouth every evening. 30 tablet 1    diclofenac sodium (VOLTAREN) 1 % Gel Apply topically to affected areas 4 times daily (Patient not taking: Reported on 9/12/2023) 100 g 0    estradioL (ESTRACE) 0.01 % (0.1 mg/gram) vaginal cream Place 1 g vaginally twice a week. 42.5 " g 2    pneumoc 20-tatiana conj-dip cr,PF, (PREVNAR-20, PF,) 0.5 mL Syrg injection Inject 0.5 mLs into the muscle. 0.5 mL 0    secukinumab (COSENTYX PEN) 150 mg/mL PnIj Inject 150 mg (1 mL) into the skin every week for 5 weeks. Then inject 150 mg (1 mL) into the skin every 4 weeks thereafter. 5 mL 0    secukinumab (COSENTYX PEN) 150 mg/mL PnIj Inject 150 mg into the skin every 28 days. 1 mL 6    [DISCONTINUED] adalimumab (HUMIRA,CF, PEN) 40 mg/0.4 mL PnKt Inject 0.4 mLs (40 mg total) into the skin every 14 (fourteen) days. 2 pen 2    [DISCONTINUED] ALPRAZolam (XANAX) 1 MG tablet Take 1 tablet (1 mg total) by mouth 3 (three) times daily as needed for Anxiety. 90 tablet 1    [DISCONTINUED] clonazePAM (KLONOPIN) 1 MG tablet Take 1 tablet by mouth daily 30 tablet 0    [DISCONTINUED] cycloSPORINE (RESTASIS MULTIDOSE) 0.05 % Drop Place 1 drop into both eyes 2 (two) times daily. 5.5 mL 11    [DISCONTINUED] cycloSPORINE (RESTASIS) 0.05 % ophthalmic emulsion Place 1 drop into both eyes twice daily 60 each 0    [DISCONTINUED] doxepin (SINEQUAN) 10 MG capsule Take 1 capsule (10 mg total) by mouth every evening. (Patient not taking: Reported on 9/12/2023) 30 capsule 2    [DISCONTINUED] etanercept (ENBREL SURECLICK) 50 mg/mL (1 mL) Inject 1 mL (50 mg total) into the skin once a week. 4 mL 4    [DISCONTINUED] glucagon, human recombinant, (GLUCAGON EMERGENCY KIT, HUMAN,) 1 mg SolR Inject 1 mg into the muscle as needed. Emergency use 1 each 1    [DISCONTINUED] LIDOcaine-prilocaine (EMLA) cream Apply topically up to 4 times per day to affected area for pain relief (Patient not taking: Reported on 9/12/2023) 60 g 0    [DISCONTINUED] metFORMIN (GLUCOPHAGE) 1000 MG tablet Take 1 tablet (1,000 mg total) by mouth 2 (two) times daily with meals. 180 tablet 1    [DISCONTINUED] secukinumab (COSENTYX PEN, 2 PENS,) 150 mg/mL PnIj Inject 150 mg into the skin every 7 days. For 5 doses, then q 4 weeks thereafter 5 mL 0     "[DISCONTINUED] secukinumab (COSENTYX PEN, 2 PENS,) 150 mg/mL PnIj Inject 300 mg into the skin every 28 days. 1 mL 6    [DISCONTINUED] TOUJEO MAX U-300 SOLOSTAR 300 unit/mL (3 mL) insulin pen Inject 50 Units into the skin once daily. 6 mL 2    [DISCONTINUED] valsartan (DIOVAN) 320 MG tablet Take 1 tablet (320 mg total) by mouth once daily. 90 tablet 3    [DISCONTINUED] zolpidem (AMBIEN CR) 6.25 MG CR tablet Take 1 tablet (6.25 mg total) by mouth nightly as needed for Insomnia. 30 tablet 1     No facility-administered encounter medications on file as of 9/12/2023.       Review of Systems         A complete 10 point ROS was completed and are positive as per above HPI. Otherwise negative for fever, diplopia, chest pain, shortness of breath, vomiting, blood in urine, joint pain, skin rash, seizures and unusual bleeding.     Objective:      /78 (BP Location: Right arm, Patient Position: Sitting, BP Method: Medium (Manual))   Pulse 84   Temp 98.1 °F (36.7 °C) (Oral)   Ht 4' 8" (1.422 m)   Wt 76.3 kg (168 lb 3.2 oz)   SpO2 97%   BMI 37.71 kg/m²   Physical Exam    Results for orders placed or performed in visit on 08/17/23   CBC Auto Differential   Result Value Ref Range    WBC 12.27 3.90 - 12.70 K/uL    RBC 4.42 4.00 - 5.40 M/uL    Hemoglobin 11.6 (L) 12.0 - 16.0 g/dL    Hematocrit 37.6 37.0 - 48.5 %    MCV 85 82 - 98 fL    MCH 26.2 (L) 27.0 - 31.0 pg    MCHC 30.9 (L) 32.0 - 36.0 g/dL    RDW 15.4 (H) 11.5 - 14.5 %    Platelets 540 (H) 150 - 450 K/uL    MPV 8.9 (L) 9.2 - 12.9 fL    Immature Granulocytes 0.3 0.0 - 0.5 %    Gran # (ANC) 5.7 1.8 - 7.7 K/uL    Immature Grans (Abs) 0.04 0.00 - 0.04 K/uL    Lymph # 5.6 (H) 1.0 - 4.8 K/uL    Mono # 0.6 0.3 - 1.0 K/uL    Eos # 0.4 0.0 - 0.5 K/uL    Baso # 0.10 0.00 - 0.20 K/uL    nRBC 0 0 /100 WBC    Gran % 46.1 38.0 - 73.0 %    Lymph % 45.2 18.0 - 48.0 %    Mono % 4.5 4.0 - 15.0 %    Eosinophil % 3.1 0.0 - 8.0 %    Basophil % 0.8 0.0 - 1.9 %    Differential Method " Automated    Comprehensive Metabolic Panel   Result Value Ref Range    Sodium 140 136 - 145 mmol/L    Potassium 4.6 3.5 - 5.1 mmol/L    Chloride 106 95 - 110 mmol/L    CO2 23 23 - 29 mmol/L    Glucose 152 (H) 70 - 110 mg/dL    BUN 18 6 - 20 mg/dL    Creatinine 1.1 0.5 - 1.4 mg/dL    Calcium 10.3 8.7 - 10.5 mg/dL    Total Protein 7.8 6.0 - 8.4 g/dL    Albumin 4.4 3.5 - 5.2 g/dL    Total Bilirubin 0.2 0.1 - 1.0 mg/dL    Alkaline Phosphatase 52 (L) 55 - 135 U/L    AST 13 10 - 40 U/L    ALT 10 10 - 44 U/L    eGFR >60 >60 mL/min/1.73 m^2    Anion Gap 11 8 - 16 mmol/L   Sedimentation rate   Result Value Ref Range    Sed Rate 15 0 - 36 mm/Hr   C-Reactive Protein   Result Value Ref Range    CRP 3.9 0.0 - 8.2 mg/L   ANTI-HISTONE ANTIBODY   Result Value Ref Range    Anti-Histone Antibody 0.3 0.0 - 0.9 Units   ISAAC Screen w/Reflex   Result Value Ref Range    ISAAC Screen Positive (A) Negative <1:80   ADALIMUMAB CONCENTRATION AND ANTI-ADALIMUMAB ANTIBODY (SERIA)   Result Value Ref Range    Adalimumab QN w/reflex to Antibody 3.3 (L) mcg/mL   ISAAC Pattern 1   Result Value Ref Range    ISAAC PATTERN 1 Homogeneous    ISAAC Profile   Result Value Ref Range    Anti Sm Antibody 0.12 0.00 - 0.99 Ratio    Anti-Sm Interpretation Negative Negative    Anti-SSA Antibody 0.07 0.00 - 0.99 Ratio    Anti-SSA Interpretation Negative Negative    Anti-SSB Antibody 0.06 0.00 - 0.99 Ratio    Anti-SSB Interpretation Negative Negative    ds DNA Ab Negative 1:10 Negative 1:10    Anti Sm/RNP Antibody 0.06 0.00 - 0.99 Ratio    Anti-Sm/RNP Interpretation Negative Negative   ISAAC Titer 1   Result Value Ref Range    ISAAC Titer 1 1:80    Adalimumab Antibody   Result Value Ref Range    Adalimumab  Antibody <10.0 <14.0 AU/mL     *Note: Due to a large number of results and/or encounters for the requested time period, some results have not been displayed. A complete set of results can be found in Results Review.     Assessment:       1. Type 2 diabetes mellitus with  hyperglycemia, with long-term current use of insulin    2. Mixed hyperlipidemia    3. Bipolar 2 disorder    4. Class 2 obesity with body mass index (BMI) of 37.0 to 37.9 in adult, unspecified obesity type, unspecified whether serious comorbidity present    5. Essential hypertension    6. BMI 37.0-37.9, adult    7. Spondyloarthritis        Plan:   Type 2 diabetes mellitus with hyperglycemia, with long-term current use of insulin  Comments:  controlled, no change    Mixed hyperlipidemia  -     Lipids Digital Medicine (LDMP) Enrollment Order  -     Lipids Digital Medicine (LDMP): Assign Onboarding Questionnaires    Bipolar 2 disorder  Comments:  controlled    Class 2 obesity with body mass index (BMI) of 37.0 to 37.9 in adult, unspecified obesity type, unspecified whether serious comorbidity present  Comments:  controlled, no change    Essential hypertension    BMI 37.0-37.9, adult  Comments:  counseled on exercise to loose weight and increase metabolism    Spondyloarthritis        I have reviewed all of the patient's clinical history available in care everywhere and Epic and have utilized this in my evaluation and management recommendations today.      Treatment options and alternatives were discussed with the patient. Patient was given ample time to ask questions. All questions were answered. Voices understanding and acceptance of this advice. Will call back if any further questions or concerns.                Sasha Sorenson MD  [facility],

## 2023-09-13 ENCOUNTER — TELEPHONE (OUTPATIENT)
Dept: DIABETES | Facility: CLINIC | Age: 53
End: 2023-09-13
Payer: MEDICAID

## 2023-09-13 DIAGNOSIS — F25.0 SCHIZOAFFECTIVE DISORDER, BIPOLAR TYPE: Chronic | ICD-10-CM

## 2023-09-13 DIAGNOSIS — N95.2 VAGINAL ATROPHY: ICD-10-CM

## 2023-09-13 RX ORDER — LAMOTRIGINE 150 MG/1
150 TABLET ORAL EVERY MORNING
Qty: 30 TABLET | Refills: 2 | Status: SHIPPED | OUTPATIENT
Start: 2023-09-13 | End: 2024-01-22 | Stop reason: SDUPTHER

## 2023-09-13 NOTE — TELEPHONE ENCOUNTER
Patient confirmed she is taking the following regimen:     - metFORMIN 1,000 mg by mouth 2 (two) times daily with meals.       - OZEMPIC 2 mg into the skin every 7 days   - Novolog she is using the sliding scale as below     If blood pre-meal sugar is 100-150 take +2 units of Novolog;   If blood pre-meal sugar is 151-200 take +3 units of Novolog;   If blood pre-meal sugar is 201-250 take +4 units of Novolog;   If blood pre-meal sugar is 251-300 take +5 units of Novolog;   If blood pre-meal sugar is 301-350 take +6 units of Novolog;   If blood pre-meal sugar is 351-400+ take +7 units of Novolog;       Patient confirmed she is taking Novolog before meals and not after, but states didn't take the novolog at all before supper

## 2023-09-13 NOTE — TELEPHONE ENCOUNTER
----- Message from Ayush Palomares PA-C sent at 9/13/2023  4:40 PM CDT -----  Advise her to do finger stick if any lows today and we will let Elizabeth know so she can review tomorrow.     Ayush Palomares PA-C  Diabetes Management       ----- Message -----  From: Alfonso Haro MA  Sent: 9/13/2023   3:59 PM CDT  To: Ayush Palomares PA-C

## 2023-09-13 NOTE — TELEPHONE ENCOUNTER
----- Message from Zohra Starr sent at 9/13/2023  8:17 AM CDT -----  Contact: [phone]  Patient would like to consult with a nurse in regards to her low blood sugar. She stated that on last night it was in the low 50 and she stayed up all night trying to bring it up. Please call her back at [phone]. Thanks KB

## 2023-09-13 NOTE — TELEPHONE ENCOUNTER
Called pt. Pt said that she has been having low blood sugar even before she changed to her new sensor and she does not believe she is having any issues with her Dexcom. Pt says that she still has blood glucose meters and supplies.

## 2023-09-13 NOTE — TELEPHONE ENCOUNTER
Ayush Palomares PA-C Wicks, Brandi, NP; MONICA Johnson Staff  Caller: Unspecified (Today,  8:46 AM)  Please let her know I am covering for Ms. Elizabeth aparicio today and do see where she is running low.     Please confirm she is taking the following regimen:     - metFORMIN 1,000 mg by mouth 2 (two) times daily with meals.       - OZEMPIC 2 mg into the skin every 7 days   - Novolog 3 units BEFORE meals plus sliding scale as below     If blood pre-meal sugar is 100-150 take +2 units of Novolog;   If blood pre-meal sugar is 151-200 take +3 units of Novolog;   If blood pre-meal sugar is 201-250 take +4 units of Novolog;   If blood pre-meal sugar is 251-300 take +5 units of Novolog;   If blood pre-meal sugar is 301-350 take +6 units of Novolog;   If blood pre-meal sugar is 351-400+ take +7 units of Novolog;       Please confirm she is taking Novolog before meals and not after. Tell her once we confirm these with her I can help her to adjust insulin.   Ask her how many units she took before supper last night     ESTEFANIA Johnson - Dex downloaded in media       Ayush Palomares PA-C   Diabetes Management

## 2023-09-14 ENCOUNTER — PATIENT MESSAGE (OUTPATIENT)
Dept: DIABETES | Facility: CLINIC | Age: 53
End: 2023-09-14
Payer: MEDICAID

## 2023-09-14 ENCOUNTER — PATIENT MESSAGE (OUTPATIENT)
Dept: OBSTETRICS AND GYNECOLOGY | Facility: CLINIC | Age: 53
End: 2023-09-14
Payer: MEDICAID

## 2023-09-14 ENCOUNTER — PATIENT MESSAGE (OUTPATIENT)
Dept: PSYCHIATRY | Facility: CLINIC | Age: 53
End: 2023-09-14

## 2023-09-14 ENCOUNTER — OFFICE VISIT (OUTPATIENT)
Dept: PSYCHIATRY | Facility: CLINIC | Age: 53
End: 2023-09-14
Payer: MEDICAID

## 2023-09-14 DIAGNOSIS — F41.1 GENERALIZED ANXIETY DISORDER: Chronic | ICD-10-CM

## 2023-09-14 DIAGNOSIS — F25.0 SCHIZOAFFECTIVE DISORDER, BIPOLAR TYPE: Primary | Chronic | ICD-10-CM

## 2023-09-14 DIAGNOSIS — B96.89 BACTERIAL VAGINOSIS: Primary | ICD-10-CM

## 2023-09-14 DIAGNOSIS — N76.0 BACTERIAL VAGINOSIS: Primary | ICD-10-CM

## 2023-09-14 DIAGNOSIS — G47.00 INSOMNIA, UNSPECIFIED TYPE: ICD-10-CM

## 2023-09-14 PROCEDURE — 1159F PR MEDICATION LIST DOCUMENTED IN MEDICAL RECORD: ICD-10-PCS | Mod: CPTII,95,, | Performed by: PSYCHOLOGIST

## 2023-09-14 PROCEDURE — 3044F PR MOST RECENT HEMOGLOBIN A1C LEVEL <7.0%: ICD-10-PCS | Mod: CPTII,95,, | Performed by: PSYCHOLOGIST

## 2023-09-14 PROCEDURE — 3072F LOW RISK FOR RETINOPATHY: CPT | Mod: CPTII,95,, | Performed by: PSYCHOLOGIST

## 2023-09-14 PROCEDURE — 3072F PR LOW RISK FOR RETINOPATHY: ICD-10-PCS | Mod: CPTII,95,, | Performed by: PSYCHOLOGIST

## 2023-09-14 PROCEDURE — 3044F HG A1C LEVEL LT 7.0%: CPT | Mod: CPTII,95,, | Performed by: PSYCHOLOGIST

## 2023-09-14 PROCEDURE — 99214 OFFICE O/P EST MOD 30 MIN: CPT | Mod: HP,HB,95, | Performed by: PSYCHOLOGIST

## 2023-09-14 PROCEDURE — 1159F MED LIST DOCD IN RCRD: CPT | Mod: CPTII,95,, | Performed by: PSYCHOLOGIST

## 2023-09-14 PROCEDURE — 4010F ACE/ARB THERAPY RXD/TAKEN: CPT | Mod: CPTII,95,, | Performed by: PSYCHOLOGIST

## 2023-09-14 PROCEDURE — 4010F PR ACE/ARB THEARPY RXD/TAKEN: ICD-10-PCS | Mod: CPTII,95,, | Performed by: PSYCHOLOGIST

## 2023-09-14 PROCEDURE — 99214 PR OFFICE/OUTPT VISIT, EST, LEVL IV, 30-39 MIN: ICD-10-PCS | Mod: HP,HB,95, | Performed by: PSYCHOLOGIST

## 2023-09-14 RX ORDER — ESTRADIOL 0.1 MG/G
1 CREAM VAGINAL
Qty: 42.5 G | Refills: 2 | Status: SHIPPED | OUTPATIENT
Start: 2023-09-14 | End: 2024-01-16 | Stop reason: SDUPTHER

## 2023-09-14 RX ORDER — METRONIDAZOLE 500 MG/1
500 TABLET ORAL 2 TIMES DAILY
Qty: 14 TABLET | Refills: 0 | Status: SHIPPED | OUTPATIENT
Start: 2023-09-14 | End: 2023-09-25

## 2023-09-14 NOTE — PATIENT INSTRUCTIONS

## 2023-09-14 NOTE — PROGRESS NOTES
Outpatient Psychiatry Follow-Up Visit    9/14/2023      Timeframe: Corona Virus Outbreak     The patient location is: Patient's home/ Patient reported that his/her location at the time of this visit was in the [facility]     Visit type: Virtual visit with synchronous audio and video     Each patient to whom he or she provides medical services by telehealth is: (1) informed of the relationship between the medical psychologist and patient and the respective role of any other health care provider with respect to management of the patient; and (2) notified that he or she may decline to receive medical services by telehealth and may withdraw from such care at any time.    I also informed patient of the following:   Mariam Young, PhD, MPAP:  [place] medical license number: [id number]    My contact info:  [facility] / 2nd Floor  [address]  [place] [address]   Ph: [phone]    If technology issues, call office phone: Ph: [phone]  If crisis: Dial 911 or go to nearest Emergency Room (ER)  If questions related to privacy practices: contact [facility] Information Department: [phone]    Chief Complaint:  Yolanda Betts is a 53 y.o. female who presents today for follow-up of mood and anxiety.       Impressions/Plan from last visit: Yolanda attended her visit. Since we last met, she was sleeping too much--was taking Xanax during the day and taking long naps. Now that she has not been taking Ambien and taking the Xanax 2 mg at night, she is not sleeping. She has not heard anything about an IOP--waiting for case management. She has talked to [place] [employer]--trying to work that out for income.    We discussed a change in her medicines--she used to take Xanax 1 mg tid and Remeron at night--but had a lot of weight gain. She has a lot of concerns about weight--also reported that she has had a slight tremor in her hands--complained of some muscle  jumping in shoulder area at times at night. We agreed to make changes to her medicines--see below.    Plan--continue Lamictal 150 mg; decrease Latuda 80 mg; decrease Cymbalta 30 mg am and 60 mg hs; change to Xanax 1 mg tid; Waiting on case mgmt for IOP; Will need to consider neurology referral depending on tremor    Interval History and Content of Current Session: Yolanda attended her virtual visit, though we had initial technical problems. Since we last met, Yolanda reported continuing sleep problems, and we changed her medicines. She is struggling with sleep--consultation with pharmacy indicated a limit of #15 per month per her insurance, but she has not picked up the 15 yet--she is tired. She is only getting about 3 hours of sleep. She is still fighting with [employer] on income--checking out the supplemental ins that she had for long-term back pay. She is supposed to talk to them on Monday of next week and is hoping that she meets the criteria. She will be starting [facility] next week. Medicines may change after she sees the provider at [facility].    Plan--continue Lamictal 150 mg; Latuda 80 mg; Cymbalta 30 mg am and 60 mg hs; Xanax 1 mg bid; Ambien 10 mg hs;     since July 2023.        ----------------------------------------------------------------------------------------------------------  Therapist: Feroz Stanley LCSW with [facility] ?  Ph: [phone]  Fax: [phone]    Prior medicines: Prozac, Wellbutrin, Paxil, Lamictal, Lexapro, Celexa, Cymbalta, Remeron, Abilify, Seroquel (raised blood sugars), Risperdal, Restoril, Ambien (groggy), Ambien CR, trazodone, Xanax, clonazepam        6/12/2023    11:18 AM 2/24/2023     7:08 AM 1/4/2023     7:43 AM   GAD7   1. Feeling nervous, anxious, or on edge? 3 1 3   2. Not being able to stop or control worrying? 3 1 3   3. Worrying too much about different things? 3 1 3   4. Trouble relaxing? 3 1 3   5. Being so restless that it is hard to sit  "still? 3 2 3   6. Becoming easily annoyed or irritable? 3 1 3   7. Feeling afraid as if something awful might happen? 0 1 3   JOANN-7 Score 18 8 21      0-4 = Minimal anxiety  5-9 = Mild anxiety  10-14 = Moderate anxiety  15-21 = Severe anxiety       Review of Systems   PSYCHIATRIC: Pertinant items are noted in the narrative.    Past Medical, Family and Social History: The patient's past medical, family and social history have been reviewed and updated as appropriate within the electronic medical record - see encounter notes.      Current Outpatient Medications:     ALPRAZolam (XANAX) 1 MG tablet, Take 1 tablet (1 mg total) by mouth 2 (two) times daily as needed for Anxiety., Disp: 60 tablet, Rfl: 1    amlodipine-valsartan (EXFORGE)  mg per tablet, Take 1 tablet by mouth once daily., Disp: 90 tablet, Rfl: 3    aspirin (ECOTRIN) 81 MG EC tablet, Take 1 tablet by mouth daily, Disp: 30 tablet, Rfl: 0    atorvastatin (LIPITOR) 20 MG tablet, Take 1 tablet (20 mg total) by mouth every evening., Disp: 90 tablet, Rfl: 3    BD INSULIN SYRINGE ULTRA-FINE 1/2 mL 30 gauge x 1/2" Syrg, , Disp: , Rfl: 0    blood sugar diagnostic (ONETOUCH ULTRA TEST) Strp, 1 each by Misc.(Non-Drug; Combo Route) route 4 (four) times daily. [facility] Glucose Test Strips, Disp: 400 strip, Rfl: 3    blood sugar diagnostic (ONETOUCH ULTRA TEST) Strp, Check blood glucose 4 times daily as directed and as needed (dispense insurance preferred brand or patient choice), Disp: 200 each, Rfl: 5    budesonide-formoterol 160-4.5 mcg (SYMBICORT) 160-4.5 mcg/actuation HFAA, Inhale 2 puffs into the lungs every 12 (twelve) hours. Controller : Use spacer, Disp: 10.2 g, Rfl: 6    cycloSPORINE 0.05 % Drop, Place 1 drop into both eyes 2 (two) times daily., Disp: 5.5 mL, Rfl: 11    diclofenac sodium (VOLTAREN) 1 % Gel, Apply topically to affected areas 4 times daily (Patient not taking: Reported on 9/12/2023), Disp: 100 g, Rfl: 0    DULoxetine (CYMBALTA) 30 MG " capsule, take 1 capsule by mouth every morning to replace the 60 mg in mornings, Disp: 30 capsule, Rfl: 2    DULoxetine (CYMBALTA) 60 MG capsule, Take 1 capsule by mouth twice daily, Disp: 60 capsule, Rfl: 2    ergocalciferol (ERGOCALCIFEROL) 50,000 unit Cap, Take 1 capsule twice weekly for 3 weeks then weekly, Disp: 12 capsule, Rfl: 3    estradioL (ESTRACE) 0.01 % (0.1 mg/gram) vaginal cream, Place 1 g vaginally twice a week., Disp: 42.5 g, Rfl: 2    fenofibrate (TRICOR) 145 MG tablet, Take 1 tablet (145 mg total) by mouth once daily., Disp: 90 tablet, Rfl: 3    flash glucose scanning reader ([employer] AMY 2 READER) [facility], Use as directed to check blood sugar, Disp: 1 each, Rfl: 1    fluticasone propionate (FLONASE) 50 mcg/actuation nasal spray, 2 sprays (100 mcg total) by Each Nare route once daily., Disp: 16 g, Rfl: 11    frovatriptan (FROVA) 2.5 MG tablet, Take 1 tablet at onset of acute migraine. May take 1 more tablet 2 hours later if needed. (MAX 2 TABLET PER DAY. MAX 4 TABLETS PER WEEK.), Disp: 9 tablet, Rfl: 1    furosemide (LASIX) 20 MG tablet, Take 1 tablet (20 mg total) by mouth once daily., Disp: 90 tablet, Rfl: 3    glucagon (BAQSIMI) 3 mg/actuation Spry, 1 spray by Nasal route as needed (hypoglycemia). For emergency use. May repeat 1 time after 15 minutes, Disp: 2 each, Rfl: 1    insulin aspart U-100 (NOVOLOG FLEXPEN U-100 INSULIN) 100 unit/mL (3 mL) InPn pen, Inject 10 Units into the skin 3 (three) times daily before meals. Use per sliding scale up to 10 units 3 times a day before meals, Disp: 9 mL, Rfl: 2    lamoTRIgine (LAMICTAL) 150 MG Tab, Take 1 tablet (150 mg total) by mouth every morning., Disp: 30 tablet, Rfl: 2    lancets (ONETOUCH DELICA PLUS LANCET) 30 gauge Misc, Use as directed 3 times daily, Disp: 100 each, Rfl: 11    levocetirizine (XYZAL) 5 MG tablet, Take 1 tablet (5 mg total) by mouth every evening., Disp: 30 tablet, Rfl: 11    linaCLOtide (LINZESS) 290 mcg Cap capsule, Take 1  "capsule (290 mcg total) by mouth before breakfast., Disp: 30 capsule, Rfl: 5    lurasidone (LATUDA) 80 mg Tab tablet, Take 1 tablet (80 mg total) by mouth once daily., Disp: 30 tablet, Rfl: 2    magnesium oxide (MAG-OX) 400 mg (241.3 mg magnesium) tablet, Take 1 tablet (400 mg total) by mouth once daily., Disp: 90 tablet, Rfl: 3    metFORMIN (GLUCOPHAGE) 1000 MG tablet, Take 1 tablet (1,000 mg total) by mouth 2 (two) times daily with meals., Disp: 180 tablet, Rfl: 1    metoprolol succinate (TOPROL-XL) 50 MG 24 hr tablet, Take 1 tablet (50 mg total) by mouth every evening., Disp: 30 tablet, Rfl: 11    omeprazole (PRILOSEC) 40 MG capsule, Take 1 capsule (40 mg total) by mouth once daily., Disp: 30 capsule, Rfl: 11    pen needle, diabetic (BD ULTRA-FINE MINI PEN NEEDLE) 31 gauge x 3/16" Ndle, Use one needle 5 times a day, Disp: 200 each, Rfl: 11    pen needle, diabetic (BD ULTRA-FINE MINI PEN NEEDLE) 31 gauge x 3/16" Ndle, Use 5 a day, Disp: 200 each, Rfl: 3    pneumoc 20-tatiana conj-dip cr,PF, (PREVNAR-20, PF,) 0.5 mL Syrg injection, Inject 0.5 mLs into the muscle., Disp: 0.5 mL, Rfl: 0    polyethylene glycol (GLYCOLAX) 17 gram PwPk, Take 1 packet (17 g) by mouth 2 (two) times daily., Disp: 180 each, Rfl: 0    predniSONE (DELTASONE) 10 MG tablet, Take 1 tablet (10 mg total) by mouth once daily., Disp: 30 tablet, Rfl: 0    promethazine (PHENERGAN) 12.5 MG Tab, , Disp: , Rfl:     prucalopride (MOTEGRITY) 2 mg Tab, Take 1 tablet (2 mg) by mouth once daily., Disp: 30 tablet, Rfl: 11    secukinumab (COSENTYX PEN) 150 mg/mL PnIj, Inject 150 mg (1 mL) into the skin every week for 5 weeks. Then inject 150 mg (1 mL) into the skin every 4 weeks thereafter., Disp: 5 mL, Rfl: 0    secukinumab (COSENTYX PEN) 150 mg/mL PnIj, Inject 150 mg into the skin every 28 days., Disp: 1 mL, Rfl: 6    semaglutide (OZEMPIC) 2 mg/dose (8 mg/3 mL) PnIj, Inject 2 mg into the skin every 7 days., Disp: 3 mL, Rfl: 5    spironolactone (ALDACTONE) 25 MG " "tablet, Take 1 tablet (25 mg total) by mouth once daily., Disp: 90 tablet, Rfl: 3    tiZANidine (ZANAFLEX) 4 MG tablet, Take 2 tablets by mouth every 6 hours as needed, Disp: 240 tablet, Rfl: 0    zolpidem (AMBIEN) 10 mg Tab, Take 1 tablet (10 mg total) by mouth every evening., Disp: 30 tablet, Rfl: 1    Compliance: yes    Side effects: see above    Risk Parameters:  Patient reports no suicidal ideation  Patient reports no homicidal ideation  Patient reports no self-injurious behavior  Patient reports no violent behavior    Exam (detailed: at least 9 elements; comprehensive: all 15 elements)   Constitutional  Vitals:  Most recent vital signs were reviewed.   Last 3 sets of Vitals        7/26/2023     3:05 PM 8/10/2023     8:08 AM 9/12/2023     9:54 AM   Vitals - 1 value per visit   SYSTOLIC 130 138 114   DIASTOLIC 85 89 78   Pulse 82 81 84   Temp   98.1 °F (36.7 °C)   SPO2   97 %   Weight (lb) 173.06 173.5 168.2   Weight (kg) 78.5 78.7 76.295   Height   4' 8" (1.422 m)   BMI (Calculated)   37.7   Pain Score   Ten          General:  age appropriate, casually dressed, neatly groomed, silk cap over hair     Musculoskeletal  Muscle Strength/Tone:  no tremor, no tic, observed during virtual visit--neck up   Gait & Station:  video visit     Psychiatric  Speech:  no latency; no press, soft   Behavior: wnl   Mood & Affect:  anxious, depressed  blunted   Thought Process:  normal and logical   Associations:  intact   Thought Content:  No change from last visit--denied suicidal ideation--unclear about hallucinations--reports that she still sees "people, objects that are not there. I'm tired of seeing rats and mice." [Described as movement out of periphery and straight on]   Insight:  has awareness of illness   Judgement: behavior is adequate to circumstances   Orientation:  grossly intact   Memory: intact for content of interview   Language: grossly intact   Attention Span & Concentration:  Grossly intact   Fund of Knowledge:  " "intact and appropriate to age and level of education     Assessment and Diagnosis   Status/Progress: Based on the examination today, the patient's problem(s) is/are treatment resistant, worsening, and failing to respond as expected to treatment.  New problems have been presented today.   Co-morbidities and psychosocial stressors  are complicating management of the primary condition.  The working differential for this patient includes schizoaffective vs bipolar.     General Impression:     Encounter Diagnoses   Name Primary?    Schizoaffective disorder, bipolar type Yes    Generalized anxiety disorder     Insomnia, unspecified type        Intervention/Counseling/Treatment Plan   Medication Management: Discussed risks, benefits, and alternatives to treatment plan documented above with patient. I answered all patient questions related to this plan, and patient expressed understanding and agreement.   continue Lamictal 150 mg; Latuda 80 mg; Cymbalta 30 mg am and 60 mg hs; Xanax 1 mg bid; Ambien 10 mg hs  Continue with therapy    IOP at [facility] next week  Will need to consider neurology referral depending on tremor      Medication List with Changes/Refills   Current Medications    ALPRAZOLAM (XANAX) 1 MG TABLET    Take 1 tablet (1 mg total) by mouth 2 (two) times daily as needed for Anxiety.    AMLODIPINE-VALSARTAN (EXFORGE)  MG PER TABLET    Take 1 tablet by mouth once daily.    ASPIRIN (ECOTRIN) 81 MG EC TABLET    Take 1 tablet by mouth daily    ATORVASTATIN (LIPITOR) 20 MG TABLET    Take 1 tablet (20 mg total) by mouth every evening.    BD INSULIN SYRINGE ULTRA-FINE 1/2 ML 30 GAUGE X 1/2" SYRG        BLOOD SUGAR DIAGNOSTIC (ONETOUCH ULTRA TEST) STRP    1 each by Misc.(Non-Drug; Combo Route) route 4 (four) times daily. [facility] Glucose Test Strips    BLOOD SUGAR DIAGNOSTIC (ONETOUCH ULTRA TEST) STRP    Check blood glucose 4 times daily as directed and as needed (dispense insurance preferred brand or patient choice) "    BUDESONIDE-FORMOTEROL 160-4.5 MCG (SYMBICORT) 160-4.5 MCG/ACTUATION HFAA    Inhale 2 puffs into the lungs every 12 (twelve) hours. Controller : Use spacer    CYCLOSPORINE 0.05 % DROP    Place 1 drop into both eyes 2 (two) times daily.    DICLOFENAC SODIUM (VOLTAREN) 1 % GEL    Apply topically to affected areas 4 times daily    DULOXETINE (CYMBALTA) 30 MG CAPSULE    take 1 capsule by mouth every morning to replace the 60 mg in mornings    DULOXETINE (CYMBALTA) 60 MG CAPSULE    Take 1 capsule by mouth twice daily    ERGOCALCIFEROL (ERGOCALCIFEROL) 50,000 UNIT CAP    Take 1 capsule twice weekly for 3 weeks then weekly    ESTRADIOL (ESTRACE) 0.01 % (0.1 MG/GRAM) VAGINAL CREAM    Place 1 g vaginally twice a week.    FENOFIBRATE (TRICOR) 145 MG TABLET    Take 1 tablet (145 mg total) by mouth once daily.    FLASH GLUCOSE SCANNING READER ([employer] AMY 2 READER) MISC    Use as directed to check blood sugar    FLUTICASONE PROPIONATE (FLONASE) 50 MCG/ACTUATION NASAL SPRAY    2 sprays (100 mcg total) by Each Nare route once daily.    FROVATRIPTAN (FROVA) 2.5 MG TABLET    Take 1 tablet at onset of acute migraine. May take 1 more tablet 2 hours later if needed. (MAX 2 TABLET PER DAY. MAX 4 TABLETS PER WEEK.)    FUROSEMIDE (LASIX) 20 MG TABLET    Take 1 tablet (20 mg total) by mouth once daily.    GLUCAGON (BAQSIMI) 3 MG/ACTUATION SPRY    1 spray by Nasal route as needed (hypoglycemia). For emergency use. May repeat 1 time after 15 minutes    INSULIN ASPART U-100 (NOVOLOG FLEXPEN U-100 INSULIN) 100 UNIT/ML (3 ML) INPN PEN    Inject 10 Units into the skin 3 (three) times daily before meals. Use per sliding scale up to 10 units 3 times a day before meals    LAMOTRIGINE (LAMICTAL) 150 MG TAB    Take 1 tablet (150 mg total) by mouth every morning.    LANCETS (ONETOUCH DELICA PLUS LANCET) 30 GAUGE MISC    Use as directed 3 times daily    LEVOCETIRIZINE (XYZAL) 5 MG TABLET    Take 1 tablet (5 mg total) by mouth every evening.     "LINACLOTIDE (LINZESS) 290 MCG CAP CAPSULE    Take 1 capsule (290 mcg total) by mouth before breakfast.    LURASIDONE (LATUDA) 80 MG TAB TABLET    Take 1 tablet (80 mg total) by mouth once daily.    MAGNESIUM OXIDE (MAG-OX) 400 MG (241.3 MG MAGNESIUM) TABLET    Take 1 tablet (400 mg total) by mouth once daily.    METFORMIN (GLUCOPHAGE) 1000 MG TABLET    Take 1 tablet (1,000 mg total) by mouth 2 (two) times daily with meals.    METOPROLOL SUCCINATE (TOPROL-XL) 50 MG 24 HR TABLET    Take 1 tablet (50 mg total) by mouth every evening.    OMEPRAZOLE (PRILOSEC) 40 MG CAPSULE    Take 1 capsule (40 mg total) by mouth once daily.    PEN NEEDLE, DIABETIC (BD ULTRA-FINE MINI PEN NEEDLE) 31 GAUGE X 3/16" NDLE    Use one needle 5 times a day    PEN NEEDLE, DIABETIC (BD ULTRA-FINE MINI PEN NEEDLE) 31 GAUGE X 3/16" NDLE    Use 5 a day    PNEUMOC 20-RAY CONJ-DIP CR,PF, (PREVNAR-20, PF,) 0.5 ML SYRG INJECTION    Inject 0.5 mLs into the muscle.    POLYETHYLENE GLYCOL (GLYCOLAX) 17 GRAM PWPK    Take 1 packet (17 g) by mouth 2 (two) times daily.    PREDNISONE (DELTASONE) 10 MG TABLET    Take 1 tablet (10 mg total) by mouth once daily.    PROMETHAZINE (PHENERGAN) 12.5 MG TAB        PRUCALOPRIDE (MOTEGRITY) 2 MG TAB    Take 1 tablet (2 mg) by mouth once daily.    SECUKINUMAB (COSENTYX PEN) 150 MG/ML PNIJ    Inject 150 mg (1 mL) into the skin every week for 5 weeks. Then inject 150 mg (1 mL) into the skin every 4 weeks thereafter.    SECUKINUMAB (COSENTYX PEN) 150 MG/ML PNIJ    Inject 150 mg into the skin every 28 days.    SEMAGLUTIDE (OZEMPIC) 2 MG/DOSE (8 MG/3 ML) PNIJ    Inject 2 mg into the skin every 7 days.    SPIRONOLACTONE (ALDACTONE) 25 MG TABLET    Take 1 tablet (25 mg total) by mouth once daily.    TIZANIDINE (ZANAFLEX) 4 MG TABLET    Take 2 tablets by mouth every 6 hours as needed    ZOLPIDEM (AMBIEN) 10 MG TAB    Take 1 tablet (10 mg total) by mouth every evening.        Return to Clinic: as scheduled    Time spent with pt " including note preparation: 28 minutes       Mariam Young, PhD, MP  Advanced Practice Medical Psychologist  [facility]  [address] [facility] [address]  [place] [address] [phone]   [phone] fax

## 2023-09-18 ENCOUNTER — LAB VISIT (OUTPATIENT)
Dept: LAB | Facility: HOSPITAL | Age: 53
End: 2023-09-18
Attending: FAMILY MEDICINE
Payer: MEDICAID

## 2023-09-18 ENCOUNTER — PATIENT MESSAGE (OUTPATIENT)
Dept: PSYCHIATRY | Facility: CLINIC | Age: 53
End: 2023-09-18
Payer: MEDICAID

## 2023-09-18 ENCOUNTER — TELEPHONE (OUTPATIENT)
Dept: PSYCHIATRY | Facility: CLINIC | Age: 53
End: 2023-09-18
Payer: MEDICAID

## 2023-09-18 ENCOUNTER — TELEPHONE (OUTPATIENT)
Dept: RHEUMATOLOGY | Facility: CLINIC | Age: 53
End: 2023-09-18
Payer: MEDICAID

## 2023-09-18 ENCOUNTER — OFFICE VISIT (OUTPATIENT)
Dept: OBSTETRICS AND GYNECOLOGY | Facility: CLINIC | Age: 53
End: 2023-09-18
Payer: MEDICAID

## 2023-09-18 VITALS
DIASTOLIC BLOOD PRESSURE: 62 MMHG | HEIGHT: 56 IN | SYSTOLIC BLOOD PRESSURE: 100 MMHG | BODY MASS INDEX: 38.99 KG/M2 | WEIGHT: 173.31 LBS

## 2023-09-18 DIAGNOSIS — N89.8 VAGINAL DISCHARGE: Primary | ICD-10-CM

## 2023-09-18 DIAGNOSIS — Z79.899 HIGH RISK MEDICATION USE: ICD-10-CM

## 2023-09-18 DIAGNOSIS — R53.1 WEAKNESS: ICD-10-CM

## 2023-09-18 DIAGNOSIS — M47.819 SPONDYLOARTHROPATHY: ICD-10-CM

## 2023-09-18 DIAGNOSIS — M47.819 SPONDYLOARTHROPATHY: Primary | ICD-10-CM

## 2023-09-18 DIAGNOSIS — M79.7 FIBROMYALGIA: ICD-10-CM

## 2023-09-18 LAB
ALBUMIN SERPL BCP-MCNC: 3.8 G/DL (ref 3.5–5.2)
ALP SERPL-CCNC: 59 U/L (ref 55–135)
ALT SERPL W/O P-5'-P-CCNC: 13 U/L (ref 10–44)
ANION GAP SERPL CALC-SCNC: 7 MMOL/L (ref 8–16)
AST SERPL-CCNC: 12 U/L (ref 10–40)
BASOPHILS # BLD AUTO: 0.08 K/UL (ref 0–0.2)
BASOPHILS NFR BLD: 1.3 % (ref 0–1.9)
BILIRUB SERPL-MCNC: 0.2 MG/DL (ref 0.1–1)
BUN SERPL-MCNC: 12 MG/DL (ref 6–20)
CALCIUM SERPL-MCNC: 9.1 MG/DL (ref 8.7–10.5)
CHLORIDE SERPL-SCNC: 111 MMOL/L (ref 95–110)
CK SERPL-CCNC: 38 U/L (ref 20–180)
CO2 SERPL-SCNC: 23 MMOL/L (ref 23–29)
CREAT SERPL-MCNC: 0.8 MG/DL (ref 0.5–1.4)
CRP SERPL-MCNC: 1.8 MG/L (ref 0–8.2)
DIFFERENTIAL METHOD: ABNORMAL
EOSINOPHIL # BLD AUTO: 0.2 K/UL (ref 0–0.5)
EOSINOPHIL NFR BLD: 3.2 % (ref 0–8)
ERYTHROCYTE [DISTWIDTH] IN BLOOD BY AUTOMATED COUNT: 15.2 % (ref 11.5–14.5)
ERYTHROCYTE [SEDIMENTATION RATE] IN BLOOD BY PHOTOMETRIC METHOD: 5 MM/HR (ref 0–36)
EST. GFR  (NO RACE VARIABLE): >60 ML/MIN/1.73 M^2
GLUCOSE SERPL-MCNC: 189 MG/DL (ref 70–110)
HCT VFR BLD AUTO: 33.9 % (ref 37–48.5)
HGB BLD-MCNC: 10.3 G/DL (ref 12–16)
IMM GRANULOCYTES # BLD AUTO: 0.02 K/UL (ref 0–0.04)
IMM GRANULOCYTES NFR BLD AUTO: 0.3 % (ref 0–0.5)
LYMPHOCYTES # BLD AUTO: 3.1 K/UL (ref 1–4.8)
LYMPHOCYTES NFR BLD: 49.8 % (ref 18–48)
MCH RBC QN AUTO: 26.3 PG (ref 27–31)
MCHC RBC AUTO-ENTMCNC: 30.4 G/DL (ref 32–36)
MCV RBC AUTO: 87 FL (ref 82–98)
MONOCYTES # BLD AUTO: 0.3 K/UL (ref 0.3–1)
MONOCYTES NFR BLD: 5.4 % (ref 4–15)
NEUTROPHILS # BLD AUTO: 2.5 K/UL (ref 1.8–7.7)
NEUTROPHILS NFR BLD: 40 % (ref 38–73)
NRBC BLD-RTO: 0 /100 WBC
PLATELET # BLD AUTO: 479 K/UL (ref 150–450)
PMV BLD AUTO: 9 FL (ref 9.2–12.9)
POTASSIUM SERPL-SCNC: 4.3 MMOL/L (ref 3.5–5.1)
PROT SERPL-MCNC: 6.6 G/DL (ref 6–8.4)
RBC # BLD AUTO: 3.92 M/UL (ref 4–5.4)
SODIUM SERPL-SCNC: 141 MMOL/L (ref 136–145)
WBC # BLD AUTO: 6.27 K/UL (ref 3.9–12.7)

## 2023-09-18 PROCEDURE — 3008F PR BODY MASS INDEX (BMI) DOCUMENTED: ICD-10-PCS | Mod: CPTII,,,

## 2023-09-18 PROCEDURE — 99999 PR PBB SHADOW E&M-EST. PATIENT-LVL IV: CPT | Mod: PBBFAC,,,

## 2023-09-18 PROCEDURE — 99212 OFFICE O/P EST SF 10 MIN: CPT | Mod: S$PBB,,,

## 2023-09-18 PROCEDURE — 1159F PR MEDICATION LIST DOCUMENTED IN MEDICAL RECORD: ICD-10-PCS | Mod: CPTII,,,

## 2023-09-18 PROCEDURE — 3072F LOW RISK FOR RETINOPATHY: CPT | Mod: CPTII,,,

## 2023-09-18 PROCEDURE — 80053 COMPREHEN METABOLIC PANEL: CPT | Performed by: PHYSICIAN ASSISTANT

## 2023-09-18 PROCEDURE — 82550 ASSAY OF CK (CPK): CPT | Performed by: PHYSICIAN ASSISTANT

## 2023-09-18 PROCEDURE — 3072F PR LOW RISK FOR RETINOPATHY: ICD-10-PCS | Mod: CPTII,,,

## 2023-09-18 PROCEDURE — 4010F PR ACE/ARB THEARPY RXD/TAKEN: ICD-10-PCS | Mod: CPTII,,,

## 2023-09-18 PROCEDURE — 3078F DIAST BP <80 MM HG: CPT | Mod: CPTII,,,

## 2023-09-18 PROCEDURE — 86140 C-REACTIVE PROTEIN: CPT | Performed by: PHYSICIAN ASSISTANT

## 2023-09-18 PROCEDURE — 99214 OFFICE O/P EST MOD 30 MIN: CPT | Mod: PBBFAC

## 2023-09-18 PROCEDURE — 99999 PR PBB SHADOW E&M-EST. PATIENT-LVL IV: ICD-10-PCS | Mod: PBBFAC,,,

## 2023-09-18 PROCEDURE — 99212 PR OFFICE/OUTPT VISIT, EST, LEVL II, 10-19 MIN: ICD-10-PCS | Mod: S$PBB,,,

## 2023-09-18 PROCEDURE — 1159F MED LIST DOCD IN RCRD: CPT | Mod: CPTII,,,

## 2023-09-18 PROCEDURE — 4010F ACE/ARB THERAPY RXD/TAKEN: CPT | Mod: CPTII,,,

## 2023-09-18 PROCEDURE — 3074F SYST BP LT 130 MM HG: CPT | Mod: CPTII,,,

## 2023-09-18 PROCEDURE — 3074F PR MOST RECENT SYSTOLIC BLOOD PRESSURE < 130 MM HG: ICD-10-PCS | Mod: CPTII,,,

## 2023-09-18 PROCEDURE — 3078F PR MOST RECENT DIASTOLIC BLOOD PRESSURE < 80 MM HG: ICD-10-PCS | Mod: CPTII,,,

## 2023-09-18 PROCEDURE — 85025 COMPLETE CBC W/AUTO DIFF WBC: CPT | Performed by: PHYSICIAN ASSISTANT

## 2023-09-18 PROCEDURE — 3008F BODY MASS INDEX DOCD: CPT | Mod: CPTII,,,

## 2023-09-18 PROCEDURE — 3044F PR MOST RECENT HEMOGLOBIN A1C LEVEL <7.0%: ICD-10-PCS | Mod: CPTII,,,

## 2023-09-18 PROCEDURE — 81514 NFCT DS BV&VAGINITIS DNA ALG: CPT

## 2023-09-18 PROCEDURE — 82085 ASSAY OF ALDOLASE: CPT | Performed by: PHYSICIAN ASSISTANT

## 2023-09-18 PROCEDURE — 85652 RBC SED RATE AUTOMATED: CPT | Performed by: PHYSICIAN ASSISTANT

## 2023-09-18 PROCEDURE — 36415 COLL VENOUS BLD VENIPUNCTURE: CPT | Performed by: PHYSICIAN ASSISTANT

## 2023-09-18 PROCEDURE — 3044F HG A1C LEVEL LT 7.0%: CPT | Mod: CPTII,,,

## 2023-09-18 RX ORDER — MONTELUKAST SODIUM 10 MG/1
10 TABLET ORAL
COMMUNITY
Start: 2023-08-24 | End: 2024-02-21

## 2023-09-18 NOTE — TELEPHONE ENCOUNTER
KALEB contacted the patient regarding the status of her IOP enrollment. Patient stated that she tried to return the call of [facility]'s intake director(Samira) on Friday and was unable to reach her. KALEB provided the patient with an alternate contact number. Pt expressed understanding and no other assistance was needed at this time. SW will remain available.

## 2023-09-18 NOTE — PROGRESS NOTES
Subjective:      Patient ID: Yolanda Betts is a 53 y.o. female.    Chief Complaint:  Vaginal Discharge      History of Present Illness  HPI    Patient presents with c/o vaginal discharge with odor for the past 4-5 days. Patient describes the odor as mild but fishy. Patient showers with mild soap, same sexual partner, occasionally wears non-cotton underwear.     Patient is diabetic, also takes Cosentyx, is concerned that the biologic is causing her to have BV    GYN & OB History  No LMP recorded. Patient has had a hysterectomy.   Date of Last Pap: No result found    OB History    Para Term  AB Living   4 4 2 2   2   SAB IAB Ectopic Multiple Live Births           2      # Outcome Date GA Lbr Nirmal/2nd Weight Sex Delivery Anes PTL Lv   4  96 34w0d   F CS-LTranv EPI  DANIELE   3  94 32w0d   F CS-LTranv EPI  DANIELE   2 Term            1 Term                Review of Systems  Review of Systems   Constitutional:  Negative for chills, fatigue and fever.   Genitourinary:  Positive for vaginal discharge and vaginal odor. Negative for dysmenorrhea, frequency, pelvic pain, urgency, vaginal pain and urinary incontinence.   All other systems reviewed and are negative.         Objective:     Physical Exam:   Constitutional: She is oriented to person, place, and time. She appears well-developed and well-nourished. No distress.    HENT:   Head: Normocephalic and atraumatic.    Eyes: Pupils are equal, round, and reactive to light. Conjunctivae and EOM are normal.     Cardiovascular:  Normal rate.             Pulmonary/Chest: Effort normal.        Abdominal: Soft. She exhibits no distension. There is no abdominal tenderness. There is no rebound and no guarding. Hernia confirmed negative in the right inguinal area and confirmed negative in the left inguinal area.     Genitourinary:    Inguinal canal, right adnexa and left adnexa normal.   Rectum:      No external hemorrhoid.      Pelvic exam was  performed with patient supine.   The external female genitalia was normal.   No external genitalia lesions identified,Genitalia hair distrobution normal .   Labial bartholins normal.There is no rash, tenderness, lesion or injury on the right labia. There is no rash, tenderness, lesion or injury on the left labia. Right adnexum displays no mass, no tenderness and no fullness. Left adnexum displays no mass, no tenderness and no fullness. There is vaginal discharge (scant, white, creamy) in the vagina. No erythema, tenderness or bleeding in the vagina.    No foreign body in the vagina.      No signs of injury in the vagina.   Vaginal atrophy noted. Cervix is absent.Uterus is absent. Normal urethral meatus.Bladder findings: no bladder distention and no bladder tenderness          Musculoskeletal: Normal range of motion and moves all extremeties.      Lymphadenopathy: No inguinal adenopathy noted on the right or left side.    Neurological: She is alert and oriented to person, place, and time.    Skin: Skin is warm and dry. No rash noted. She is not diaphoretic. No erythema. No pallor.    Psychiatric: She has a normal mood and affect. Her behavior is normal. Judgment and thought content normal.         Assessment:     1. Vaginal discharge               Plan:      Vaginal discharge  -     VAGINOSIS SCREEN BY DNA PROBE    Flagyl sent 9/14/23, patient has not picked up, will do so today, cultures pending    Discussed use of daily probiotic in addition to current vv hygiene practices    Follow up if symptoms worsen or fail to improve.

## 2023-09-18 NOTE — TELEPHONE ENCOUNTER
----- Message from Mohini Wyman PA-C sent at 9/18/2023  8:10 AM CDT -----  I had requested CK and aldolase after this pts audio visit a couple weeks ago.  Can we set her up this week for Reg 4, CK and aldolase, please?  Thank you!  Annia

## 2023-09-19 LAB
ALDOLASE SERPL-CCNC: 1.8 U/L (ref 1.2–7.6)
BACTERIAL VAGINOSIS DNA: NEGATIVE
CANDIDA GLABRATA DNA: POSITIVE
CANDIDA KRUSEI DNA: NEGATIVE
CANDIDA RRNA VAG QL PROBE: NEGATIVE
T VAGINALIS RRNA GENITAL QL PROBE: NEGATIVE

## 2023-09-20 ENCOUNTER — PATIENT MESSAGE (OUTPATIENT)
Dept: OBSTETRICS AND GYNECOLOGY | Facility: CLINIC | Age: 53
End: 2023-09-20
Payer: MEDICAID

## 2023-09-20 DIAGNOSIS — B37.9 CANDIDA GLABRATA INFECTION: Primary | ICD-10-CM

## 2023-09-22 ENCOUNTER — PATIENT MESSAGE (OUTPATIENT)
Dept: RHEUMATOLOGY | Facility: CLINIC | Age: 53
End: 2023-09-22
Payer: MEDICAID

## 2023-09-22 ENCOUNTER — TELEPHONE (OUTPATIENT)
Dept: RHEUMATOLOGY | Facility: CLINIC | Age: 53
End: 2023-09-22
Payer: MEDICAID

## 2023-09-22 NOTE — TELEPHONE ENCOUNTER
----- Message from Mischa Mendel, MA sent at 9/22/2023 11:50 AM CDT -----  Contact: Yolanda    ----- Message -----  From: Arnel Coburn  Sent: 9/22/2023  11:46 AM CDT  To: [facility] Staff    Patient is calling agustín to speak with the nurse regarding disability paperwork and home health. Please give patient a call  at .[phone].

## 2023-10-02 ENCOUNTER — TELEPHONE (OUTPATIENT)
Dept: RHEUMATOLOGY | Facility: CLINIC | Age: 53
End: 2023-10-02
Payer: MEDICAID

## 2023-10-02 ENCOUNTER — HOSPITAL ENCOUNTER (OUTPATIENT)
Dept: RADIOLOGY | Facility: HOSPITAL | Age: 53
Discharge: HOME | End: 2023-10-02
Payer: MEDICAID

## 2023-10-02 VITALS — WEIGHT: 171.94 LBS | HEIGHT: 56 IN | BODY MASS INDEX: 38.68 KG/M2

## 2023-10-02 DIAGNOSIS — Z12.31 ENCOUNTER FOR SCREENING MAMMOGRAM FOR BREAST CANCER: ICD-10-CM

## 2023-10-02 PROCEDURE — 77067 SCR MAMMO BI INCL CAD: CPT | Mod: 26,,, | Performed by: RADIOLOGY

## 2023-10-02 PROCEDURE — 77063 BREAST TOMOSYNTHESIS BI: CPT | Mod: 26,,, | Performed by: RADIOLOGY

## 2023-10-02 PROCEDURE — 77063 MAMMO DIGITAL SCREENING BILAT WITH TOMO: ICD-10-PCS | Mod: 26,,, | Performed by: RADIOLOGY

## 2023-10-02 PROCEDURE — 77067 SCR MAMMO BI INCL CAD: CPT | Mod: TC

## 2023-10-02 PROCEDURE — 77067 MAMMO DIGITAL SCREENING BILAT WITH TOMO: ICD-10-PCS | Mod: 26,,, | Performed by: RADIOLOGY

## 2023-10-02 NOTE — TELEPHONE ENCOUNTER
"Per Annia:    "  Based on drug literature, not a common complaint.  However, [facility] personal support Specialists who can help answer some of these questions.  Their [phone].  I would encourage her to call them to discuss any concerns she may have.   Have her let me know what they say.   Mohini Wyman PA-C   [facility]          Patient aware  "

## 2023-10-02 NOTE — TELEPHONE ENCOUNTER
----- Message from Milagros Kwonyashmartha sent at 10/2/2023 10:13 AM CDT -----  Contact: pt  Pt is calling in regard to having the nurse call her back about her medication and need to know if bone pain is involved.  Please call her back at  [phone] [email or web address]

## 2023-10-02 NOTE — TELEPHONE ENCOUNTER
Patient state that she took injection of Cosentyx on Friday she then started with rt leg pain on outside of rt thigh area . Lasted all weekend . It is better today . First and only time for  pain with the injections and wanted to know if this was normal reaction for the injection .  Patient woke  up with a sore throat this morning and head ache she will be  taking a home Covid test and will call back with results

## 2023-10-03 ENCOUNTER — PATIENT MESSAGE (OUTPATIENT)
Dept: OTHER | Facility: OTHER | Age: 53
End: 2023-10-03
Payer: MEDICAID

## 2023-10-05 ENCOUNTER — OFFICE VISIT (OUTPATIENT)
Dept: DIABETES | Facility: CLINIC | Age: 53
End: 2023-10-05
Payer: MEDICAID

## 2023-10-05 ENCOUNTER — TELEPHONE (OUTPATIENT)
Dept: RHEUMATOLOGY | Facility: CLINIC | Age: 53
End: 2023-10-05
Payer: MEDICAID

## 2023-10-05 DIAGNOSIS — Z79.4 TYPE 2 DIABETES MELLITUS WITH HYPERGLYCEMIA, WITH LONG-TERM CURRENT USE OF INSULIN: Primary | ICD-10-CM

## 2023-10-05 DIAGNOSIS — E11.65 TYPE 2 DIABETES MELLITUS WITH HYPERGLYCEMIA, WITH LONG-TERM CURRENT USE OF INSULIN: Primary | ICD-10-CM

## 2023-10-05 DIAGNOSIS — E78.5 DYSLIPIDEMIA ASSOCIATED WITH TYPE 2 DIABETES MELLITUS: ICD-10-CM

## 2023-10-05 DIAGNOSIS — E11.59 HYPERTENSION COMPLICATING DIABETES: ICD-10-CM

## 2023-10-05 DIAGNOSIS — E11.69 DYSLIPIDEMIA ASSOCIATED WITH TYPE 2 DIABETES MELLITUS: ICD-10-CM

## 2023-10-05 DIAGNOSIS — I15.2 HYPERTENSION COMPLICATING DIABETES: ICD-10-CM

## 2023-10-05 PROCEDURE — 3044F PR MOST RECENT HEMOGLOBIN A1C LEVEL <7.0%: ICD-10-PCS | Mod: CPTII,95,, | Performed by: NURSE PRACTITIONER

## 2023-10-05 PROCEDURE — 99214 PR OFFICE/OUTPT VISIT, EST, LEVL IV, 30-39 MIN: ICD-10-PCS | Mod: 95,,, | Performed by: NURSE PRACTITIONER

## 2023-10-05 PROCEDURE — 1160F RVW MEDS BY RX/DR IN RCRD: CPT | Mod: CPTII,95,, | Performed by: NURSE PRACTITIONER

## 2023-10-05 PROCEDURE — 1160F PR REVIEW ALL MEDS BY PRESCRIBER/CLIN PHARMACIST DOCUMENTED: ICD-10-PCS | Mod: CPTII,95,, | Performed by: NURSE PRACTITIONER

## 2023-10-05 PROCEDURE — 4010F PR ACE/ARB THEARPY RXD/TAKEN: ICD-10-PCS | Mod: CPTII,95,, | Performed by: NURSE PRACTITIONER

## 2023-10-05 PROCEDURE — 1159F MED LIST DOCD IN RCRD: CPT | Mod: CPTII,95,, | Performed by: NURSE PRACTITIONER

## 2023-10-05 PROCEDURE — 1159F PR MEDICATION LIST DOCUMENTED IN MEDICAL RECORD: ICD-10-PCS | Mod: CPTII,95,, | Performed by: NURSE PRACTITIONER

## 2023-10-05 PROCEDURE — 3044F HG A1C LEVEL LT 7.0%: CPT | Mod: CPTII,95,, | Performed by: NURSE PRACTITIONER

## 2023-10-05 PROCEDURE — 4010F ACE/ARB THERAPY RXD/TAKEN: CPT | Mod: CPTII,95,, | Performed by: NURSE PRACTITIONER

## 2023-10-05 PROCEDURE — 99214 OFFICE O/P EST MOD 30 MIN: CPT | Mod: 95,,, | Performed by: NURSE PRACTITIONER

## 2023-10-05 PROCEDURE — 3072F LOW RISK FOR RETINOPATHY: CPT | Mod: CPTII,95,, | Performed by: NURSE PRACTITIONER

## 2023-10-05 PROCEDURE — 3072F PR LOW RISK FOR RETINOPATHY: ICD-10-PCS | Mod: CPTII,95,, | Performed by: NURSE PRACTITIONER

## 2023-10-05 NOTE — TELEPHONE ENCOUNTER
----- Message from Jana Mejia sent at 10/5/2023  7:22 AM CDT -----  States she needs to find out the status of Home Health, PT and an CNA (personal care attendant). Please call pt [phone]. Thank you

## 2023-10-05 NOTE — PROGRESS NOTES
Subjective:         Patient ID: Yolanda Betts is a 53 y.o. female.  Patient's current PCP is Sasha Sorenson MD.       Chief Complaint: No chief complaint on file.      HPI  Yolanda Betts is a 53 y.o. Black or  female presenting for a follow up for diabetes. Patient has been diagnosed with diabetes for > 10 years.     Complications related to diabetes:  HTN, Hyperlipidemia, peripheral neuropathy; denies Pancreatitis; denies Gastroparesis; denies DKA; denies Hx/family Hx of MEN2/MTC; reports Frequent UTIs/yeast infections; Bariatric surgery 6/1/21.     Past failed treatment include:  Jardiance- multiple yeast infections; Victoza - GI upset    Blood glucose testing is performed regularly with her Dexcom; Interpretation of CGMS as follows: Average Glucose: 122 mg/dl; 1 % Very High; 6% High; 91 % In Range; 1% Low;  1% Very Low     Compliance:The patient reports medication and diet compliance most of the time.       The patient location is: [place], [place]  The chief complaint leading to consultation is: DM follow up    Visit type: audiovisual    Face to Face time with patient: 30 minutes of total time spent on the encounter, which includes face to face time and non-face to face time preparing to see the patient (eg, review of tests), Obtaining and/or reviewing separately obtained history, Documenting clinical information in the electronic or other health record, Independently interpreting results (not separately reported) and communicating results to the patient/family/caregiver, or Care coordination (not separately reported). Each patient to whom he or she provides medical services by telemedicine is:  (1) informed of the relationship between the physician and patient and the respective role of any other health care provider with respect to management of the patient; and (2) notified that he or she may decline to receive medical services by telemedicine and may withdraw from such care at any  time.         //   , There is no height or weight on file to calculate BMI.  Her blood sugar in clinic today is:   Lab Results   Component Value Date    POCGLU 78 03/10/2023       Labs reviewed and are noted below.  Her most recent A1C is:  Lab Results   Component Value Date    HGBA1C 5.6 08/08/2023    HGBA1C 6.3 (H) 01/13/2023    HGBA1C 6.4 (H) 09/21/2022     Lab Results   Component Value Date    CPEPTIDE 4.56 02/26/2018     Lab Results   Component Value Date    GLUTAMICACID 0.00 02/26/2018     Glucose   Date Value Ref Range Status   09/18/2023 189 (H) 70 - 110 mg/dL Final     Anion Gap   Date Value Ref Range Status   09/18/2023 7 (L) 8 - 16 mmol/L Final     eGFR if    Date Value Ref Range Status   03/08/2022 >60.0 >60 mL/min/1.73 m^2 Final     eGFR if non    Date Value Ref Range Status   03/08/2022 >60.0 >60 mL/min/1.73 m^2 Final     Comment:     Calculation used to obtain the estimated glomerular filtration  rate (eGFR) is the CKD-EPI equation.          CURRENT DM MEDICATIONS:     Diabetes Medications               glucagon (BAQSIMI) 3 mg/actuation Spry 1 spray by Nasal route as needed (hypoglycemia). For emergency use. May repeat 1 time after 15 minutes    insulin aspart U-100 (NOVOLOG FLEXPEN U-100 INSULIN) 100 unit/mL (3 mL) InPn pen Inject 10 Units into the skin 3 (three) times daily before meals. Use per sliding scale up to 10 units 3 times a day before meals    metFORMIN (GLUCOPHAGE) 1000 MG tablet Take 1 tablet (1,000 mg total) by mouth 2 (two) times daily with meals.    semaglutide (OZEMPIC) 2 mg/dose (8 mg/3 mL) PnIj Inject 2 mg into the skin every 7 days.                    Diabetes Management Status    Statin: Taking  ACE/ARB: Taking    Screening or Prevention Patient's value Goal Complete/Controlled?   HgA1C Testing and Control   Lab Results   Component Value Date    HGBA1C 5.6 08/08/2023      Annually/Less than 8% Yes   Lipid profile : 08/08/2023 Annually Yes   LDL  control Lab Results   Component Value Date    LDLCALC 43.4 (L) 08/08/2023    Annually/Less than 100 mg/dl  Yes   Nephropathy screening Lab Results   Component Value Date    LABMICR 5.0 09/21/2022     Lab Results   Component Value Date    PROTEINUA Negative 07/20/2020    Annually Yes   Blood pressure BP Readings from Last 1 Encounters:   09/18/23 100/62    Less than 140/90 Yes   Dilated retinal exam : 12/19/2022 Annually Yes   Foot exam   : 02/16/2023 Annually Yes     Review of Systems   Constitutional:  Negative for activity change and appetite change.   Gastrointestinal:  Positive for constipation. Negative for abdominal pain, diarrhea, nausea and vomiting.        Hx of IBS, GERD   Endocrine: Negative for polydipsia, polyphagia and polyuria.   Musculoskeletal:  Positive for arthralgias.   Neurological:  Negative for syncope and weakness.        Neuropathy   Psychiatric/Behavioral:  Negative for confusion.          Objective:      Physical Exam  Constitutional:       General: She is not in acute distress.     Appearance: She is not ill-appearing.   Neck:      Thyroid: Mass: neg of self exam.   Neurological:      Mental Status: She is alert.   Psychiatric:         Speech: Speech normal.         Assessment:       1. Type 2 diabetes mellitus with hyperglycemia, with long-term current use of insulin    2. Dyslipidemia associated with type 2 diabetes mellitus    3. Hypertension complicating diabetes                Plan:   Type 2 diabetes mellitus with hyperglycemia, with long-term current use of insulin    Dyslipidemia associated with type 2 diabetes mellitus    Hypertension complicating diabetes             PLAN:   - Condition: good control   - Monitor blood glucose 4x daily. Goals reviewed  - She is working with the RD  - BP and LDL- Recommended lifestyle modifications for management. Encouraged healthy low fat, low carb diet and increase physical activity  - Medication Changes: Continue Toujeo, Continue Metformin,  Continue Ozempic   - Advised to follow up with GI regarding constipation, taking Miralax daily- reviewed fiber and hydration  - The patient was explained the above plan and given opportunity to ask questions.  She understands, chooses and consents to this plan and accepts all the risks, which include but are not limited to the risks mentioned above.   - Labs ordered as above  - Nurse visit:  deferred    - Follow up: 2 months     If blood pre-meal sugar is  take 2 units of Novolog;  If blood pre-meal sugar is 151-200 add +2 units of Novolog;  If blood pre-meal sugar is 201-250 add +4 units of Novolog;  If blood pre-meal sugar is 251-300 add +6 units of Novolog;  If blood pre-meal sugar is 301-350 add  +8 units of Novolog;  If blood pre-meal sugar is 351-400+ add  +10 units of Novolog;      A total of 30 minutes was spent in face to face time, of which over 50% was spent in counseling patient on disease process, complications, treatment, and side effects of medications.

## 2023-10-05 NOTE — TELEPHONE ENCOUNTER
Left message to have her call member service on the back of the insurance to see how accepts her insurance. We need a name and fax phone number.

## 2023-10-09 ENCOUNTER — TELEPHONE (OUTPATIENT)
Dept: RHEUMATOLOGY | Facility: CLINIC | Age: 53
End: 2023-10-09
Payer: MEDICAID

## 2023-10-09 ENCOUNTER — HOSPITAL ENCOUNTER (OUTPATIENT)
Dept: RADIOLOGY | Facility: HOSPITAL | Age: 53
Discharge: HOME | End: 2023-10-09
Attending: PHYSICIAN ASSISTANT
Payer: MEDICAID

## 2023-10-09 ENCOUNTER — OFFICE VISIT (OUTPATIENT)
Dept: RHEUMATOLOGY | Facility: CLINIC | Age: 53
End: 2023-10-09
Payer: MEDICAID

## 2023-10-09 VITALS
WEIGHT: 171.94 LBS | BODY MASS INDEX: 38.68 KG/M2 | HEART RATE: 75 BPM | HEIGHT: 56 IN | SYSTOLIC BLOOD PRESSURE: 125 MMHG | DIASTOLIC BLOOD PRESSURE: 74 MMHG

## 2023-10-09 DIAGNOSIS — M45.9 ANKYLOSING SPONDYLITIS, UNSPECIFIED SITE OF SPINE: ICD-10-CM

## 2023-10-09 DIAGNOSIS — M79.642 PAIN IN BOTH HANDS: ICD-10-CM

## 2023-10-09 DIAGNOSIS — M79.652 PAIN OF LEFT THIGH: ICD-10-CM

## 2023-10-09 DIAGNOSIS — M79.641 PAIN IN BOTH HANDS: ICD-10-CM

## 2023-10-09 DIAGNOSIS — F41.1 GENERALIZED ANXIETY DISORDER: ICD-10-CM

## 2023-10-09 DIAGNOSIS — M47.819 SPONDYLOARTHROPATHY: Primary | ICD-10-CM

## 2023-10-09 DIAGNOSIS — M79.7 FIBROMYALGIA: ICD-10-CM

## 2023-10-09 PROCEDURE — 3072F LOW RISK FOR RETINOPATHY: CPT | Mod: CPTII,,, | Performed by: PHYSICIAN ASSISTANT

## 2023-10-09 PROCEDURE — 99215 OFFICE O/P EST HI 40 MIN: CPT | Mod: S$PBB,,, | Performed by: PHYSICIAN ASSISTANT

## 2023-10-09 PROCEDURE — 73130 XR HAND COMPLETE 3 VIEWS BILATERAL: ICD-10-PCS | Mod: 26,RT,, | Performed by: RADIOLOGY

## 2023-10-09 PROCEDURE — 3008F PR BODY MASS INDEX (BMI) DOCUMENTED: ICD-10-PCS | Mod: CPTII,,, | Performed by: PHYSICIAN ASSISTANT

## 2023-10-09 PROCEDURE — 73130 X-RAY EXAM OF HAND: CPT | Mod: 26,LT,, | Performed by: RADIOLOGY

## 2023-10-09 PROCEDURE — 73552 XR FEMUR 2 VIEW LEFT: ICD-10-PCS | Mod: 26,LT,, | Performed by: RADIOLOGY

## 2023-10-09 PROCEDURE — 73552 X-RAY EXAM OF FEMUR 2/>: CPT | Mod: 26,LT,, | Performed by: RADIOLOGY

## 2023-10-09 PROCEDURE — 99215 PR OFFICE/OUTPT VISIT, EST, LEVL V, 40-54 MIN: ICD-10-PCS | Mod: S$PBB,,, | Performed by: PHYSICIAN ASSISTANT

## 2023-10-09 PROCEDURE — 99999 PR PBB SHADOW E&M-EST. PATIENT-LVL V: ICD-10-PCS | Mod: PBBFAC,,, | Performed by: PHYSICIAN ASSISTANT

## 2023-10-09 PROCEDURE — 3008F BODY MASS INDEX DOCD: CPT | Mod: CPTII,,, | Performed by: PHYSICIAN ASSISTANT

## 2023-10-09 PROCEDURE — 3044F HG A1C LEVEL LT 7.0%: CPT | Mod: CPTII,,, | Performed by: PHYSICIAN ASSISTANT

## 2023-10-09 PROCEDURE — 3078F DIAST BP <80 MM HG: CPT | Mod: CPTII,,, | Performed by: PHYSICIAN ASSISTANT

## 2023-10-09 PROCEDURE — 4010F ACE/ARB THERAPY RXD/TAKEN: CPT | Mod: CPTII,,, | Performed by: PHYSICIAN ASSISTANT

## 2023-10-09 PROCEDURE — 99215 OFFICE O/P EST HI 40 MIN: CPT | Mod: PBBFAC | Performed by: PHYSICIAN ASSISTANT

## 2023-10-09 PROCEDURE — 3074F PR MOST RECENT SYSTOLIC BLOOD PRESSURE < 130 MM HG: ICD-10-PCS | Mod: CPTII,,, | Performed by: PHYSICIAN ASSISTANT

## 2023-10-09 PROCEDURE — 3074F SYST BP LT 130 MM HG: CPT | Mod: CPTII,,, | Performed by: PHYSICIAN ASSISTANT

## 2023-10-09 PROCEDURE — 3072F PR LOW RISK FOR RETINOPATHY: ICD-10-PCS | Mod: CPTII,,, | Performed by: PHYSICIAN ASSISTANT

## 2023-10-09 PROCEDURE — 1160F PR REVIEW ALL MEDS BY PRESCRIBER/CLIN PHARMACIST DOCUMENTED: ICD-10-PCS | Mod: CPTII,,, | Performed by: PHYSICIAN ASSISTANT

## 2023-10-09 PROCEDURE — 4010F PR ACE/ARB THEARPY RXD/TAKEN: ICD-10-PCS | Mod: CPTII,,, | Performed by: PHYSICIAN ASSISTANT

## 2023-10-09 PROCEDURE — 73130 X-RAY EXAM OF HAND: CPT | Mod: 26,RT,, | Performed by: RADIOLOGY

## 2023-10-09 PROCEDURE — 1159F MED LIST DOCD IN RCRD: CPT | Mod: CPTII,,, | Performed by: PHYSICIAN ASSISTANT

## 2023-10-09 PROCEDURE — 73552 X-RAY EXAM OF FEMUR 2/>: CPT | Mod: TC,LT

## 2023-10-09 PROCEDURE — 73130 X-RAY EXAM OF HAND: CPT | Mod: TC,50

## 2023-10-09 PROCEDURE — 3044F PR MOST RECENT HEMOGLOBIN A1C LEVEL <7.0%: ICD-10-PCS | Mod: CPTII,,, | Performed by: PHYSICIAN ASSISTANT

## 2023-10-09 PROCEDURE — 1159F PR MEDICATION LIST DOCUMENTED IN MEDICAL RECORD: ICD-10-PCS | Mod: CPTII,,, | Performed by: PHYSICIAN ASSISTANT

## 2023-10-09 PROCEDURE — 3078F PR MOST RECENT DIASTOLIC BLOOD PRESSURE < 80 MM HG: ICD-10-PCS | Mod: CPTII,,, | Performed by: PHYSICIAN ASSISTANT

## 2023-10-09 PROCEDURE — 99999 PR PBB SHADOW E&M-EST. PATIENT-LVL V: CPT | Mod: PBBFAC,,, | Performed by: PHYSICIAN ASSISTANT

## 2023-10-09 PROCEDURE — 1160F RVW MEDS BY RX/DR IN RCRD: CPT | Mod: CPTII,,, | Performed by: PHYSICIAN ASSISTANT

## 2023-10-09 RX ORDER — SECUKINUMAB 150 MG/ML
300 INJECTION SUBCUTANEOUS
Qty: 2 ML | Refills: 6 | Status: ACTIVE | OUTPATIENT
Start: 2023-10-09

## 2023-10-09 RX ORDER — DULOXETIN HYDROCHLORIDE 60 MG/1
60 CAPSULE, DELAYED RELEASE ORAL 2 TIMES DAILY
Qty: 180 CAPSULE | Refills: 3 | Status: SHIPPED | OUTPATIENT
Start: 2023-10-09 | End: 2024-10-03

## 2023-10-09 NOTE — PATIENT INSTRUCTIONS
[facility], [facility]  [phone]   Call them to see if they have our orders.  If not, let us know

## 2023-10-09 NOTE — TELEPHONE ENCOUNTER
----- Message from Carmen Choi sent at 10/9/2023  3:57 PM CDT -----  Regarding: concerns  Name of who is calling:  Yolanda       What is the request in detail: Pt is requesting a call back in ref to getting a referral sent to [facility] tel #[phone] for physical therapy or  [facility] tel #[phone]      Can the clinic reply by [facility]:yes      What number to call back if not [facility]:[phone]

## 2023-10-09 NOTE — PROGRESS NOTES
Subjective:      Patient ID: Yolanda Betts is a 53 y.o. female.    Chief Complaint: spondyloarthropathy      HPI   Yolanda Betts  is a 53 y.o. female seen today for follow-up on ankylosing spondylitis and fibromyalgia.  Has seen Dr. Phillips who has her on Cymbalta 60 mg bid and tizanidine as below.  Psychiatry recently reduced Cymbalta to 30 mg in the morning and 60 mg at night.  Patient feels as though chronic pain has worsened since dose reduction.  Both have helped her fibromyalgia symptoms.  She is still complaining of widespread diffuse pain.  She has failed gabapentin and Lyrica both in the past.  She has also failed Savella.    Ankylosing spondylitis diagnosis was made with inflammatory back pain symptoms, MRI suggestive of sclerosis bilateral SI joints, as well as improvement in symptoms while on a steroid trial.  Patient has history of chronically elevated CRP improved with steroid trial in the past as well.  She has failed Humira due to injection site reaction.  Failed Enbrel due to severe dizziness with Enbrel.  She has switched to Cosentyx 150 mg.  She is still in her loading doses.  Completes the 5th loading dose on Friday this week.    Initially had complaints of bone pain.  I had advised her to touch base with the Cosentyx team.  Bone pain is not a known complication from Cosentyx.  They verified that as well.  She states she was having pain in the left femur.  That has since resolved.  In past she has also failed oral anti-inflammatories in the form of ibuprofen as well as Mobic.    She has started to see some mild improvement in Cosentyx.  She still complains of morning stiffness lasting most of the day.  Has not been able to work since January 10, 2023.  She has exhausted short term disability through her work insurance.  She would like to see if we can fill out long term disability paperwork for her.  She is applying for disability through the social security office as well.    She has  had an EMG nerve conduction study showing carpal tunnel syndrome.  She is established with Dr. Roth.  Has had injections in the past but it has been quite some time.  Still complaining of numbness and tingling in her hands as well as worsening  strength particularly on the left side.  Patient in the past has not been interested in carpal tunnel syndrome due to potential long-term weakness.    Patient denies fevers, chills, photosensitivity, eye pain, shortness of breath, chest pain, hematuria, blood in the stool, rash, sicca symptoms, raynauds, finger ulcerations.  Rheumatologic systems otherwise negative.    Serologies/Labs:  Neg RF, CCP, ISAAC  Neg HLA B27  Previously elevated ESR/CRP   Current Treatment:  Tizanidine 4 mg tid  Cymbalta 60 mg bid  Previous Treatment:   Flexeril  Lyrica  Gabapentin  Savella  Low dose Naltrexone - no relief          Past Medical History:   Diagnosis Date    Abnormal Pap smear of cervix     HPV genital warts    Anemia     Anxiety     Arthritis     Asthma 10/11/2016    Bipolar 1 disorder     Diabetes mellitus, type 2     Dyslipidemia associated with type 2 diabetes mellitus 5/13/2019    General anesthetics causing adverse effect in therapeutic use     Genital warts     GERD (gastroesophageal reflux disease)     Herpes simplex virus (HSV) infection     Hyperlipidemia     Hypertension     Hypertension complicating diabetes 5/5/2019    Migraine with aura and without status migrainosus, not intractable 3/21/2016    Mild persistent asthma without complication 10/11/2016    Morbid obesity with body mass index (BMI) of 45.0 to 49.9 in adult 8/3/2017    Obstructive sleep apnea     ALEN (obstructive sleep apnea)     2L per N/C q HS    Schizoaffective disorder, bipolar type 4/25/2019    Seasonal allergic rhinitis due to pollen 5/13/2019    Type 2 diabetes mellitus with hyperglycemia, with long-term current use of insulin 12/21/2017    Type 2 diabetes mellitus with hyperglycemia, without  "long-term current use of insulin 12/21/2017     Family History   Problem Relation Age of Onset    Diabetes Mother     Hyperlipidemia Mother     Hypertension Mother     Asthma Mother     COPD Mother     Glaucoma Mother     Thyroid disease Mother     Anesthesia problems Mother         "almost had a cardiac arrest" , blood clots    Hypertension Father     Hyperlipidemia Father     Cancer Father         Brain, lung, liver, kidney    Heart disease Maternal Grandmother     Hyperlipidemia Maternal Grandmother     Hypertension Maternal Grandmother     Cataracts Maternal Grandmother     Diabetes Maternal Grandmother     Heart disease Maternal Grandfather     Hyperlipidemia Maternal Grandfather     Hypertension Maternal Grandfather     Glaucoma Maternal Grandfather     Cancer Maternal Grandfather     Cataracts Maternal Grandfather     Macular degeneration Maternal Grandfather     Diabetes Maternal Grandfather     Heart disease Paternal Grandmother     Hyperlipidemia Paternal Grandmother     Hypertension Paternal Grandmother     Cataracts Paternal Grandmother     Heart disease Paternal Grandfather     Hyperlipidemia Paternal Grandfather     Hypertension Paternal Grandfather     Cataracts Paternal Grandfather     Breast cancer Maternal Cousin     Breast cancer Maternal Cousin     Breast cancer Maternal Cousin     Breast cancer Maternal Cousin      Social History     Socioeconomic History    Marital status: Single   Tobacco Use    Smoking status: Never    Smokeless tobacco: Never   Substance and Sexual Activity    Alcohol use: Yes     Alcohol/week: 0.0 standard drinks of alcohol     Comment: socially  No alcohol 72h prior to sx    Drug use: No    Sexual activity: Yes     Partners: Male     Birth control/protection: Surgical     Comment: hyst   Social History Narrative    Long-term care nurse     Social Determinants of Health     Financial Resource Strain: High Risk (2/1/2023)    Overall Financial Resource Strain (CARDIA)     " Difficulty of Paying Living Expenses: Very hard   Food Insecurity: Food Insecurity Present (2/1/2023)    Hunger Vital Sign     Worried About Running Out of Food in the Last Year: Sometimes true     Ran Out of Food in the Last Year: Sometimes true   Transportation Needs: No Transportation Needs (2/1/2023)    PRAPARE - Transportation     Lack of Transportation (Medical): No     Lack of Transportation (Non-Medical): No   Physical Activity: Inactive (2/1/2023)    Exercise Vital Sign     Days of Exercise per Week: 0 days     Minutes of Exercise per Session: 0 min   Stress: Stress Concern Present (2/1/2023)    [employer] - Occupational Stress Questionnaire     Feeling of Stress : Very much   Social Connections: Moderately Isolated (2/1/2023)    Social Connection and Isolation Panel [NHANES]     Frequency of Communication with Friends and Family: More than three times a week     Frequency of Social Gatherings with Friends and Family: More than three times a week     Attends Hoahaoism Services: More than 4 times per year     Active Member of Clubs or Organizations: No     Attends Club or Organization Meetings: Never     Marital Status:    Housing Stability: High Risk (2/1/2023)    Housing Stability Vital Sign     Unable to Pay for Housing in the Last Year: Yes     Number of Places Lived in the Last Year: 1     Unstable Housing in the Last Year: No     Review of patient's allergies indicates:   Allergen Reactions    Codeine Itching    Hydromorphone Other (See Comments)     Can't wake up for long time if taken  Slow to wake up after surgery after receiving    Aleve [naproxen sodium]     Lyrica [pregabalin] Itching    Motrin [ibuprofen]     Neuromuscular blockers, steroidal Hives     some    Latex, natural rubber Rash    Morphine Rash     itching    Norco [hydrocodone-acetaminophen] Itching, Rash and Hallucinations    Seconal [secobarbital sodium] Rash     itching    Tylox  "[oxycodone-acetaminophen] Rash       Objective:   /74   Pulse 75   Ht 4' 8" (1.422 m)   Wt 78 kg (171 lb 15.3 oz)   BMI 38.55 kg/m²   Immunization History   Administered Date(s) Administered    COVID-19, MRNA, LN-S, PF (MODERNA FULL 0.5 ML DOSE) 03/30/2021, 04/26/2021    Influenza 10/01/2018    Influenza - Quadrivalent - PF *Preferred* (6 months and older) 12/21/2017, 09/24/2020, 02/16/2023, 10/09/2023    Pneumococcal Conjugate - 20 Valent 01/17/2023    Pneumococcal Polysaccharide - 23 Valent 01/31/2017    Tdap 12/21/2017    Zoster Recombinant 01/17/2023, 06/27/2023       Physical Exam   Constitutional: She is oriented to person, place, and time. No distress.   HENT:   Head: Normocephalic and atraumatic.   Pulmonary/Chest: Effort normal.   Abdominal: She exhibits no distension.   Musculoskeletal:         General: No swelling or tenderness. Normal range of motion.      Cervical back: Normal range of motion.   Lymphadenopathy:     She has no cervical adenopathy.   Neurological: She is alert and oriented to person, place, and time.   Skin: Skin is warm and dry. No rash noted.   Psychiatric: Mood normal.   Nursing note and vitals reviewed.      Mulitple tender trigger pionts  Sign ttp Bilat SI joints        Recent Results (from the past 672 hour(s))   CBC Auto Differential    Collection Time: 09/18/23  9:25 AM   Result Value Ref Range    WBC 6.27 3.90 - 12.70 K/uL    RBC 3.92 (L) 4.00 - 5.40 M/uL    Hemoglobin 10.3 (L) 12.0 - 16.0 g/dL    Hematocrit 33.9 (L) 37.0 - 48.5 %    MCV 87 82 - 98 fL    MCH 26.3 (L) 27.0 - 31.0 pg    MCHC 30.4 (L) 32.0 - 36.0 g/dL    RDW 15.2 (H) 11.5 - 14.5 %    Platelets 479 (H) 150 - 450 K/uL    MPV 9.0 (L) 9.2 - 12.9 fL    Immature Granulocytes 0.3 0.0 - 0.5 %    Gran # (ANC) 2.5 1.8 - 7.7 K/uL    Immature Grans (Abs) 0.02 0.00 - 0.04 K/uL    Lymph # 3.1 1.0 - 4.8 K/uL    Mono # 0.3 0.3 - 1.0 K/uL    Eos # 0.2 0.0 - 0.5 K/uL    Baso # 0.08 0.00 - 0.20 K/uL    nRBC 0 0 /100 WBC    " Gran % 40.0 38.0 - 73.0 %    Lymph % 49.8 (H) 18.0 - 48.0 %    Mono % 5.4 4.0 - 15.0 %    Eosinophil % 3.2 0.0 - 8.0 %    Basophil % 1.3 0.0 - 1.9 %    Differential Method Automated    Comprehensive Metabolic Panel    Collection Time: 09/18/23  9:25 AM   Result Value Ref Range    Sodium 141 136 - 145 mmol/L    Potassium 4.3 3.5 - 5.1 mmol/L    Chloride 111 (H) 95 - 110 mmol/L    CO2 23 23 - 29 mmol/L    Glucose 189 (H) 70 - 110 mg/dL    BUN 12 6 - 20 mg/dL    Creatinine 0.8 0.5 - 1.4 mg/dL    Calcium 9.1 8.7 - 10.5 mg/dL    Total Protein 6.6 6.0 - 8.4 g/dL    Albumin 3.8 3.5 - 5.2 g/dL    Total Bilirubin 0.2 0.1 - 1.0 mg/dL    Alkaline Phosphatase 59 55 - 135 U/L    AST 12 10 - 40 U/L    ALT 13 10 - 44 U/L    eGFR >60 >60 mL/min/1.73 m^2    Anion Gap 7 (L) 8 - 16 mmol/L   Sedimentation rate    Collection Time: 09/18/23  9:25 AM   Result Value Ref Range    Sed Rate 5 0 - 36 mm/Hr   C-Reactive Protein    Collection Time: 09/18/23  9:25 AM   Result Value Ref Range    CRP 1.8 0.0 - 8.2 mg/L   CK    Collection Time: 09/18/23  9:25 AM   Result Value Ref Range    CPK 38 20 - 180 U/L   Aldolase    Collection Time: 09/18/23  9:25 AM   Result Value Ref Range    Aldolase 1.8 1.2 - 7.6 U/L   VAGINOSIS SCREEN BY DNA PROBE    Collection Time: 09/18/23 10:30 AM    Specimen: Vaginal; Genital   Result Value Ref Range    Trichomonas vaginalis Negative Negative    Candida sp Negative Negative    Candida glabrata DNA Positive (A) Negative    Candida krusei DNA Negative Negative    Bacterial vaginosis DNA Negative Negative   CBC Auto Differential    Collection Time: 10/09/23 11:13 AM   Result Value Ref Range    WBC 8.03 3.90 - 12.70 K/uL    RBC 4.08 4.00 - 5.40 M/uL    Hemoglobin 10.9 (L) 12.0 - 16.0 g/dL    Hematocrit 35.4 (L) 37.0 - 48.5 %    MCV 87 82 - 98 fL    MCH 26.7 (L) 27.0 - 31.0 pg    MCHC 30.8 (L) 32.0 - 36.0 g/dL    RDW 14.5 11.5 - 14.5 %    Platelets 501 (H) 150 - 450 K/uL    MPV 9.1 (L) 9.2 - 12.9 fL    Immature  Granulocytes 0.1 0.0 - 0.5 %    Gran # (ANC) 3.1 1.8 - 7.7 K/uL    Immature Grans (Abs) 0.01 0.00 - 0.04 K/uL    Lymph # 4.1 1.0 - 4.8 K/uL    Mono # 0.5 0.3 - 1.0 K/uL    Eos # 0.3 0.0 - 0.5 K/uL    Baso # 0.09 0.00 - 0.20 K/uL    nRBC 0 0 /100 WBC    Gran % 38.4 38.0 - 73.0 %    Lymph % 51.1 (H) 18.0 - 48.0 %    Mono % 6.1 4.0 - 15.0 %    Eosinophil % 3.2 0.0 - 8.0 %    Basophil % 1.1 0.0 - 1.9 %    Differential Method Automated    Comprehensive Metabolic Panel    Collection Time: 10/09/23 11:13 AM   Result Value Ref Range    Sodium 141 136 - 145 mmol/L    Potassium 4.5 3.5 - 5.1 mmol/L    Chloride 107 95 - 110 mmol/L    CO2 25 23 - 29 mmol/L    Glucose 122 (H) 70 - 110 mg/dL    BUN 12 6 - 20 mg/dL    Creatinine 0.9 0.5 - 1.4 mg/dL    Calcium 9.9 8.7 - 10.5 mg/dL    Total Protein 7.3 6.0 - 8.4 g/dL    Albumin 4.2 3.5 - 5.2 g/dL    Total Bilirubin 0.2 0.1 - 1.0 mg/dL    Alkaline Phosphatase 51 (L) 55 - 135 U/L    AST 16 10 - 40 U/L    ALT 15 10 - 44 U/L    eGFR >60 >60 mL/min/1.73 m^2    Anion Gap 9 8 - 16 mmol/L   C-Reactive Protein    Collection Time: 10/09/23 11:13 AM   Result Value Ref Range    CRP 6.7 0.0 - 8.2 mg/L       Lab Results   Component Value Date    TBGOLDPLUS Negative 06/27/2023      Lab Results   Component Value Date    HEPAIGM Non-reactive 06/27/2023    HEPBIGM Non-reactive 06/27/2023    HEPCAB Non-reactive 06/27/2023        Imaging  I have personally reviewed images and reports as below.  I agree with the interpretation.  X-Ray Femur 2 View Left  Narrative: EXAMINATION:  XR FEMUR 2 VIEW LEFT    CLINICAL HISTORY:  Pain in left thigh    TECHNIQUE:  AP and lateral views of the left femur were performed.    COMPARISON:  None}    FINDINGS:  No acute osseous or soft tissue abnormality.  Impression: As above    Electronically signed by: Joel Gutierrez MD  Date:    10/09/2023  Time:    11:36  X-Ray Hand 3 View Bilateral  Narrative: EXAMINATION:  XR HAND COMPLETE 3 VIEWS BILATERAL    CLINICAL  HISTORY:  . Pain in right hand    TECHNIQUE:  PA, lateral, and oblique views of both hands were performed.    COMPARISON:  Prior radiographs    FINDINGS:  No acute abnormality with similar mild OA findings  Impression: As above    Electronically signed by: Joel Gutierrez MD  Date:    10/09/2023  Time:    11:35        Assessment:     1. Spondyloarthropathy    2. Ankylosing spondylitis, unspecified site of spine    3. Fibromyalgia    4. Generalized anxiety disorder    5. Pain of left thigh    6. Pain in both hands                Plan:     Yolanda was seen today for spondyloarthropathy.    Diagnoses and all orders for this visit:    Spondyloarthropathy  -     secukinumab (COSENTYX PEN, 2 PENS,) 150 mg/mL PnIj; Inject 300 mg into the skin every 28 days.    Ankylosing spondylitis, unspecified site of spine    Fibromyalgia    Generalized anxiety disorder  -     DULoxetine (CYMBALTA) 60 MG capsule; Take 1 capsule (60 mg total) by mouth 2 (two) times daily.    Pain of left thigh  -     X-Ray Femur 2 View Left; Future    Pain in both hands  -     X-Ray Hand 3 View Bilateral; Future      Ankylosising Spondylitis  Criteria  MRI evidence of sclerosis SI joints  Elevated CRP in past  Inflammatory back pain  Has failed NSAIDs in past\  Humira DCed d/t inj site rxn  Enbrel DCed d/t dizziness  Labs reviewed as above  CMP/CBC stable  TB/hep serologies up to date  C/w Cosentyx 150 mg  Finishes the last loading dose this week  Starting to see some improvement  Increase to 300 mg q 4 weeks w first maintenance dose  Tolerating well.    Bone pain resolved  Check Left femur xray today  Patient requesting home health physical therapy  I submitted orders September 2023  Awaiting instruction on where to fax the orders  She is also requesting aid a couple of times week for help at home  We will ask staff to contact [facility] health as I have not received a fax with the forearm needed to make a request  Pain and infflammation hand MCPs  b/l  Check xray hands today  Essentially normal  No erosive changes  Potentially peripheral manifestations of ankylosing spondylitis  May consider addition of MTX  Severe FM affecting ADLs and ability to work  Has failed Lyrica, gabapentin, Savella, low-dose naltrexone  Was doing better on Cymbalta 60 mg b.i.d.  Resume Cymbalta 60 mg b.i.d.  New prescription sent in  No evidence CKD.  BUN, creatinine, GFR all within normal limits  Continue to monitor renal function  Out of work since 1/10/23  Exhausted Short Term disability at work after 6 weeks  Would like to apply for long-term disability through her job  We will back date long term disability from January 2023 through January 15, 2024  Goal would be for patient to get well managed on Cosentyx and Cymbalta and be able to return to work even if with some restriction  Patient currently applying for disability through the social security office as well  Carpal tunnel syndrome bilaterally  Recommend follow-up with Orthopedics  She is left-hand complaining of decreased  strength on the left hand.  Has mild atrophy in the thenar eminence.    We will cc this note to Dr. Roht staff so they can get her scheduled  Drug therapy requiring intensive monitoring for toxicity  High Risk Medication Monitoring encounter  No current medication related issues, no evidence of toxicity  I ordered labs for toxicity monitoring, have personally reviewed the findings, and discussed them with the patient.  Pending labs will be sent via the portal  Compromised immune system secondary to autoimmune disease and/or use of immunosuppressive drugs.  Monitor carefully for infections.  Advised patient to get immediate medical care if any infection arises.  Also advised strict adherence age-appropriate vaccinations and cancer screenings with PCP.  Patient advised to hold DMARD and/or biologic therapy for signs of infection or for surgery. If you are unsure what to do please call our office  for instruction.[facility] [phone]  Return to clinic: 3 mos w reg 4 prior    50 minutes of total time spent on the encounter, which includes face to face time and non-face to face time preparing to see the patient (eg, review of tests), Obtaining and/or reviewing separately obtained history, Documenting clinical information in the electronic or other health record, Independently interpreting results (not separately reported) and communicating results to the patient/family/caregiver, or Care coordination (not separately reported).     Follow up in about 3 months (around 1/9/2024).    The patient understands, chooses and consents to this plan and accepts all the risks which include but are not limited to the risks mentioned above.     Disclaimer: This note was prepared using a voice recognition system and is likely to have sound alike errors within the text.

## 2023-10-10 ENCOUNTER — TELEPHONE (OUTPATIENT)
Dept: ORTHOPEDICS | Facility: CLINIC | Age: 53
End: 2023-10-10
Payer: MEDICAID

## 2023-10-10 ENCOUNTER — TELEPHONE (OUTPATIENT)
Dept: PSYCHIATRY | Facility: CLINIC | Age: 53
End: 2023-10-10
Payer: MEDICAID

## 2023-10-10 ENCOUNTER — OFFICE VISIT (OUTPATIENT)
Dept: PSYCHIATRY | Facility: CLINIC | Age: 53
End: 2023-10-10
Payer: MEDICAID

## 2023-10-10 VITALS
DIASTOLIC BLOOD PRESSURE: 79 MMHG | SYSTOLIC BLOOD PRESSURE: 139 MMHG | BODY MASS INDEX: 38.6 KG/M2 | HEART RATE: 77 BPM | WEIGHT: 172.19 LBS

## 2023-10-10 DIAGNOSIS — F25.0 SCHIZOAFFECTIVE DISORDER, BIPOLAR TYPE: Primary | Chronic | ICD-10-CM

## 2023-10-10 DIAGNOSIS — F41.1 GENERALIZED ANXIETY DISORDER: Chronic | ICD-10-CM

## 2023-10-10 PROCEDURE — 99215 PR OFFICE/OUTPT VISIT, EST, LEVL V, 40-54 MIN: ICD-10-PCS | Mod: HP,HB,S$PBB, | Performed by: PSYCHOLOGIST

## 2023-10-10 PROCEDURE — 99212 OFFICE O/P EST SF 10 MIN: CPT | Mod: PBBFAC | Performed by: PSYCHOLOGIST

## 2023-10-10 PROCEDURE — 3044F PR MOST RECENT HEMOGLOBIN A1C LEVEL <7.0%: ICD-10-PCS | Mod: CPTII,,, | Performed by: PSYCHOLOGIST

## 2023-10-10 PROCEDURE — 4010F ACE/ARB THERAPY RXD/TAKEN: CPT | Mod: CPTII,,, | Performed by: PSYCHOLOGIST

## 2023-10-10 PROCEDURE — 3075F PR MOST RECENT SYSTOLIC BLOOD PRESS GE 130-139MM HG: ICD-10-PCS | Mod: CPTII,,, | Performed by: PSYCHOLOGIST

## 2023-10-10 PROCEDURE — 3008F PR BODY MASS INDEX (BMI) DOCUMENTED: ICD-10-PCS | Mod: CPTII,,, | Performed by: PSYCHOLOGIST

## 2023-10-10 PROCEDURE — 4010F PR ACE/ARB THEARPY RXD/TAKEN: ICD-10-PCS | Mod: CPTII,,, | Performed by: PSYCHOLOGIST

## 2023-10-10 PROCEDURE — 3072F PR LOW RISK FOR RETINOPATHY: ICD-10-PCS | Mod: CPTII,,, | Performed by: PSYCHOLOGIST

## 2023-10-10 PROCEDURE — 99999 PR PBB SHADOW E&M-EST. PATIENT-LVL II: ICD-10-PCS | Mod: PBBFAC,HB,, | Performed by: PSYCHOLOGIST

## 2023-10-10 PROCEDURE — 99215 OFFICE O/P EST HI 40 MIN: CPT | Mod: HP,HB,S$PBB, | Performed by: PSYCHOLOGIST

## 2023-10-10 PROCEDURE — 3008F BODY MASS INDEX DOCD: CPT | Mod: CPTII,,, | Performed by: PSYCHOLOGIST

## 2023-10-10 PROCEDURE — 1159F PR MEDICATION LIST DOCUMENTED IN MEDICAL RECORD: ICD-10-PCS | Mod: CPTII,,, | Performed by: PSYCHOLOGIST

## 2023-10-10 PROCEDURE — 3078F PR MOST RECENT DIASTOLIC BLOOD PRESSURE < 80 MM HG: ICD-10-PCS | Mod: CPTII,,, | Performed by: PSYCHOLOGIST

## 2023-10-10 PROCEDURE — 3044F HG A1C LEVEL LT 7.0%: CPT | Mod: CPTII,,, | Performed by: PSYCHOLOGIST

## 2023-10-10 PROCEDURE — 3078F DIAST BP <80 MM HG: CPT | Mod: CPTII,,, | Performed by: PSYCHOLOGIST

## 2023-10-10 PROCEDURE — 99999 PR PBB SHADOW E&M-EST. PATIENT-LVL II: CPT | Mod: PBBFAC,HB,, | Performed by: PSYCHOLOGIST

## 2023-10-10 PROCEDURE — 1159F MED LIST DOCD IN RCRD: CPT | Mod: CPTII,,, | Performed by: PSYCHOLOGIST

## 2023-10-10 PROCEDURE — 3075F SYST BP GE 130 - 139MM HG: CPT | Mod: CPTII,,, | Performed by: PSYCHOLOGIST

## 2023-10-10 PROCEDURE — 3072F LOW RISK FOR RETINOPATHY: CPT | Mod: CPTII,,, | Performed by: PSYCHOLOGIST

## 2023-10-10 RX ORDER — LURASIDONE HYDROCHLORIDE 80 MG/1
80 TABLET, FILM COATED ORAL DAILY
Qty: 30 TABLET | Refills: 2 | Status: SHIPPED | OUTPATIENT
Start: 2023-10-10 | End: 2024-01-22 | Stop reason: SDUPTHER

## 2023-10-10 NOTE — PROGRESS NOTES
"Outpatient Psychiatry Follow-Up Visit    10/10/2023      Chief Complaint:  Yolanda Betts is a 53 y.o. female who presents today for follow-up of mood and anxiety.       Impressions/Plan from last visit: Yolanda attended her virtual visit, though we had initial technical problems. Since we last met, Yolanda reported continuing sleep problems, and we changed her medicines. She is struggling with sleep--consultation with pharmacy indicated a limit of #15 per month per her insurance, but she has not picked up the 15 yet--she is tired. She is only getting about 3 hours of sleep. She is still fighting with [employer] on income--checking out the supplemental ins that she had for long-term back pay. She is supposed to talk to them on Monday of next week and is hoping that she meets the criteria. She will be starting [place] next week. Medicines may change after she sees the provider at [place].    Plan--continue Lamictal 150 mg; Latuda 80 mg; Cymbalta 30 mg am and 60 mg hs; Xanax 1 mg bid; Ambien 10 mg hs;    Interval History and Content of Current Session: Yolanda attended her visit. Nurse said that when Yolanda went to the clinic yesterday "to get the papers signed," she initially said that she was going out of town today. System shows an ortho appt later today. She also reportedly stated that she had only been to the [facility] one time. Yolanda said that she only went to [facility] one time because she has been in pain and has had a difficult time getting up and moving around--"this week I've been doing good so far."    [facility]  [phone]    She said that she went to [facility] 3 times--the lady with whom she originally talked reportedly quit and they did not have her papers. She then said that she went 3 times. She says today that the reason that she has not been going to [facility] is because of physical pain. She reported that she plans to return.    Behavioral health symptoms--"the depression, the anxiety, panic attacks"; she reported " "hearing voices--"I have to have ambient noises so that I don't hear them. It's all day, continuously." She reported that she hears "conversations, words"--sometimes can follow them. She denied that there are times when she does not hear them. She said that she is "always depressed." She is "always down, angry, argumentantative." She reported having panic but does not know what triggers it--"like right now, it feels like somebody is sitting on my chest. I feel trembly inside. I feel shakey." Those symptoms are "all the time." There are other times when symptoms are worse and she has to lie down; take deep breaths. Sometimes, those last an hour. She sometimes calls her daughter and has a distraction.    Prior to the visit, provider tried calling for Feroz Stanley, therapist, but got his voicemail and left a message.    Yolanda has transportation for [facility]--has had multiple appts--"back to back" and has not been able to go.    We completed the form for her work disability--see uploaded.     She is now on Remeron for sleep; no longer on Ambien. She is also on Cymbalta 60 mg bid with another provider.    Plan--continue Lamictal 150 mg; Latuda 80 mg; Xanax 1 mg bid; others by other providers     since September 2023.        ----------------------------------------------------------------------------------------------------------  Therapist: Feroz tSanley LCSW with [facility] ?  Ph: [phone]  Fax: [phone]    Prior medicines: Prozac, Wellbutrin, Paxil, Lamictal, Lexapro, Celexa, Cymbalta, Remeron, Abilify, Seroquel (raised blood sugars), Risperdal, Restoril, Ambien (groggy), Ambien CR, trazodone, Xanax, clonazepam        9/14/2023     1:01 PM 6/12/2023    11:18 AM 2/24/2023     7:08 AM   GAD7   1. Feeling nervous, anxious, or on edge? 3 3 1   2. Not being able to stop or control worrying? 3 3 1   3. Worrying too much about different things? 3 3 1   4. Trouble relaxing? 3 3 1   5. Being so " "restless that it is hard to sit still? 3 3 2   6. Becoming easily annoyed or irritable? 3 3 1   7. Feeling afraid as if something awful might happen? 3 0 1   JOANN-7 Score 21 18 8      0-4 = Minimal anxiety  5-9 = Mild anxiety  10-14 = Moderate anxiety  15-21 = Severe anxiety       Review of Systems   PSYCHIATRIC: Pertinant items are noted in the narrative.    Past Medical, Family and Social History: The patient's past medical, family and social history have been reviewed and updated as appropriate within the electronic medical record - see encounter notes.      Current Outpatient Medications:     ALPRAZolam (XANAX) 1 MG tablet, Take 1 tablet (1 mg total) by mouth 2 (two) times daily as needed for Anxiety., Disp: 60 tablet, Rfl: 1    amlodipine-valsartan (EXFORGE)  mg per tablet, Take 1 tablet by mouth once daily., Disp: 90 tablet, Rfl: 3    aspirin (ECOTRIN) 81 MG EC tablet, Take 1 tablet by mouth daily, Disp: 30 tablet, Rfl: 0    atorvastatin (LIPITOR) 20 MG tablet, Take 1 tablet (20 mg total) by mouth every evening., Disp: 90 tablet, Rfl: 3    BD INSULIN SYRINGE ULTRA-FINE 1/2 mL 30 gauge x 1/2" Syrg, , Disp: , Rfl: 0    blood sugar diagnostic (ONETOUCH ULTRA TEST) Strp, 1 each by Misc.(Non-Drug; Combo Route) route 4 (four) times daily. [facility] Glucose Test Strips, Disp: 400 strip, Rfl: 3    blood sugar diagnostic (ONETOUCH ULTRA TEST) Strp, Check blood glucose 4 times daily as directed and as needed (dispense insurance preferred brand or patient choice), Disp: 200 each, Rfl: 5    budesonide-formoterol 160-4.5 mcg (SYMBICORT) 160-4.5 mcg/actuation HFAA, Inhale 2 puffs into the lungs every 12 (twelve) hours. Controller : Use spacer, Disp: 10.2 g, Rfl: 6    cycloSPORINE 0.05 % Drop, Place 1 drop into both eyes 2 (two) times daily., Disp: 5.5 mL, Rfl: 11    DULoxetine (CYMBALTA) 60 MG capsule, Take 1 capsule (60 mg total) by mouth 2 (two) times daily., Disp: 180 capsule, Rfl: 3    ergocalciferol (ERGOCALCIFEROL) " 50,000 unit Cap, Take 1 capsule twice weekly for 3 weeks then weekly, Disp: 12 capsule, Rfl: 3    estradioL (ESTRACE) 0.01 % (0.1 mg/gram) vaginal cream, Place 1 g vaginally twice a week., Disp: 42.5 g, Rfl: 2    fenofibrate (TRICOR) 145 MG tablet, Take 1 tablet (145 mg total) by mouth once daily., Disp: 90 tablet, Rfl: 3    flash glucose scanning reader (FREESTYLE AMY 2 READER) [facility] [place] [facility], Use as directed to check blood sugar, Disp: 1 each, Rfl: 1    flu vacc [id number] 6mos up,PF, 60 mcg (15 mcg x 4)/0.5 mL Syrg, Inject 0.5 mLs into the muscle once. for 1 dose, Disp: 0.5 mL, Rfl: 0    fluticasone propionate (FLONASE) 50 mcg/actuation nasal spray, 2 sprays (100 mcg total) by Each Nare route once daily., Disp: 16 g, Rfl: 11    frovatriptan (FROVA) 2.5 MG tablet, Take 1 tablet at onset of acute migraine. May take 1 more tablet 2 hours later if needed. (MAX 2 TABLET PER DAY. MAX 4 TABLETS PER WEEK.), Disp: 9 tablet, Rfl: 1    furosemide (LASIX) 20 MG tablet, Take 1 tablet (20 mg total) by mouth once daily., Disp: 90 tablet, Rfl: 3    glucagon (BAQSIMI) 3 mg/actuation Spry, 1 spray by Nasal route as needed (hypoglycemia). For emergency use. May repeat 1 time after 15 minutes, Disp: 2 each, Rfl: 1    insulin aspart U-100 (NOVOLOG FLEXPEN U-100 INSULIN) 100 unit/mL (3 mL) InPn pen, Inject 10 Units into the skin 3 (three) times daily before meals. Use per sliding scale up to 10 units 3 times a day before meals, Disp: 9 mL, Rfl: 2    lamoTRIgine (LAMICTAL) 150 MG Tab, Take 1 tablet (150 mg total) by mouth every morning., Disp: 30 tablet, Rfl: 2    lancets (ONETOUCH DELICA PLUS LANCET) 30 gauge Misc, Use as directed 3 times daily, Disp: 100 each, Rfl: 11    levocetirizine (XYZAL) 5 MG tablet, Take 1 tablet (5 mg total) by mouth every evening., Disp: 30 tablet, Rfl: 11    linaCLOtide (LINZESS) 290 mcg Cap capsule, Take 1 capsule (290 mcg total) by mouth before breakfast., Disp: 30 capsule, Rfl: 5    lurasidone (LATUDA) 80 mg Tab  "tablet, Take 1 tablet (80 mg total) by mouth once daily., Disp: 30 tablet, Rfl: 2    magnesium oxide (MAG-OX) 400 mg (241.3 mg magnesium) tablet, Take 1 tablet (400 mg total) by mouth once daily., Disp: 90 tablet, Rfl: 3    metFORMIN (GLUCOPHAGE) 1000 MG tablet, Take 1 tablet (1,000 mg total) by mouth 2 (two) times daily with meals., Disp: 180 tablet, Rfl: 1    metoprolol succinate (TOPROL-XL) 50 MG 24 hr tablet, Take 1 tablet (50 mg total) by mouth every evening., Disp: 30 tablet, Rfl: 11    mirtazapine (REMERON) 15 MG tablet, Take 1 tablet by mouth at bedtime., Disp: 30 tablet, Rfl: 0    montelukast (SINGULAIR) 10 mg tablet, Take 10 mg by mouth., Disp: , Rfl:     omeprazole (PRILOSEC) 40 MG capsule, Take 1 capsule (40 mg total) by mouth once daily., Disp: 30 capsule, Rfl: 11    pen needle, diabetic (BD ULTRA-FINE MINI PEN NEEDLE) 31 gauge x 3/16" Ndle, Use one needle 5 times a day, Disp: 200 each, Rfl: 11    pen needle, diabetic (BD ULTRA-FINE MINI PEN NEEDLE) 31 gauge x 3/16" Ndle, Use 5 a day, Disp: 200 each, Rfl: 3    pneumoc 20-tatiana conj-dip cr,PF, (PREVNAR-20, PF,) 0.5 mL Syrg injection, Inject 0.5 mLs into the muscle., Disp: 0.5 mL, Rfl: 0    polyethylene glycol (GLYCOLAX) 17 gram PwPk, Take 1 packet (17 g) by mouth 2 (two) times daily., Disp: 180 each, Rfl: 0    predniSONE (DELTASONE) 10 MG tablet, Take 1 tablet (10 mg total) by mouth once daily., Disp: 30 tablet, Rfl: 0    promethazine (PHENERGAN) 12.5 MG Tab, , Disp: , Rfl:     prucalopride (MOTEGRITY) 2 mg Tab, Take 1 tablet (2 mg) by mouth once daily., Disp: 30 tablet, Rfl: 11    secukinumab (COSENTYX PEN, 2 PENS,) 150 mg/mL PnIj, Inject 300 mg into the skin every 28 days., Disp: 2 mL, Rfl: 6    semaglutide (OZEMPIC) 2 mg/dose (8 mg/3 mL) PnIj, Inject 2 mg into the skin every 7 days., Disp: 3 mL, Rfl: 5    spironolactone (ALDACTONE) 25 MG tablet, Take 1 tablet (25 mg total) by mouth once daily., Disp: 90 tablet, Rfl: 3    tiZANidine (ZANAFLEX) 4 MG " "tablet, Take 2 tablets by mouth every 6 hours as needed, Disp: 240 tablet, Rfl: 0    Compliance: yes    Side effects: see above    Risk Parameters:  Patient reports no suicidal ideation  Patient reports no homicidal ideation  Patient reports no self-injurious behavior  Patient reports no violent behavior    Exam (detailed: at least 9 elements; comprehensive: all 15 elements)   Constitutional  Vitals:  Most recent vital signs were reviewed.   Last 3 sets of Vitals        9/18/2023     8:25 AM 10/2/2023     7:58 AM 10/9/2023     9:27 AM   Vitals - 1 value per visit   SYSTOLIC 100  125   DIASTOLIC 62  74   Pulse   75   Weight (lb) 173.28 171.96 171.96   Weight (kg) 78.6 78 78   Height 4' 8" (1.422 m) 4' 8" (1.422 m) 4' 8" (1.422 m)   BMI (Calculated) 38.9 38.6 38.6   Pain Score Zero  Ten          General:  age appropriate, casually dressed, neatly groomed     Musculoskeletal  Muscle Strength/Tone:  no tremor, no tic   Gait & Station:  Slower ambulation; slower getting up from sitting position     Psychiatric  Speech:  no latency; no press, soft   Behavior: wnl   Mood & Affect:  anxious, depressed  blunted   Thought Process:  normal and logical   Associations:  intact   Thought Content:  No change from last visit--denied suicidal ideation--unclear about hallucinations--reports hearing voices   Insight:  has awareness of illness   Judgement: behavior is adequate to circumstances   Orientation:  grossly intact   Memory: intact for content of interview   Language: grossly intact   Attention Span & Concentration:  Grossly intact   Fund of Knowledge:  intact and appropriate to age and level of education     Assessment and Diagnosis   Status/Progress: Based on the examination today, the patient's problem(s) is/are treatment resistant and failing to respond as expected to treatment.  New problems have not been presented today.   Co-morbidities and psychosocial stressors  are complicating management of the primary condition.  " "The working differential for this patient includes schizoaffective vs bipolar.     General Impression:     Encounter Diagnoses   Name Primary?    Schizoaffective disorder, bipolar type Yes    Generalized anxiety disorder        Intervention/Counseling/Treatment Plan   Medication Management: Discussed risks, benefits, and alternatives to treatment plan documented above with patient. I answered all patient questions related to this plan, and patient expressed understanding and agreement.   continue Lamictal 150 mg; Latuda 80 mg; Xanax 1 mg bid  Continue with therapy    IOP at [facility]      Medication List with Changes/Refills   Current Medications    ALPRAZOLAM (XANAX) 1 MG TABLET    Take 1 tablet (1 mg total) by mouth 2 (two) times daily as needed for Anxiety.    AMLODIPINE-VALSARTAN (EXFORGE)  MG PER TABLET    Take 1 tablet by mouth once daily.    ASPIRIN (ECOTRIN) 81 MG EC TABLET    Take 1 tablet by mouth daily    ATORVASTATIN (LIPITOR) 20 MG TABLET    Take 1 tablet (20 mg total) by mouth every evening.    BD INSULIN SYRINGE ULTRA-FINE 1/2 ML 30 GAUGE X 1/2" SYRG        BLOOD SUGAR DIAGNOSTIC (ONETOUCH ULTRA TEST) STRP    1 each by Misc.(Non-Drug; Combo Route) route 4 (four) times daily. [facility] Glucose Test Strips    BLOOD SUGAR DIAGNOSTIC (ONETOUCH ULTRA TEST) STRP    Check blood glucose 4 times daily as directed and as needed (dispense insurance preferred brand or patient choice)    BUDESONIDE-FORMOTEROL 160-4.5 MCG (SYMBICORT) 160-4.5 MCG/ACTUATION HFAA    Inhale 2 puffs into the lungs every 12 (twelve) hours. Controller : Use spacer    CYCLOSPORINE 0.05 % DROP    Place 1 drop into both eyes 2 (two) times daily.    DULOXETINE (CYMBALTA) 60 MG CAPSULE    Take 1 capsule (60 mg total) by mouth 2 (two) times daily.    ERGOCALCIFEROL (ERGOCALCIFEROL) 50,000 UNIT CAP    Take 1 capsule twice weekly for 3 weeks then weekly    ESTRADIOL (ESTRACE) 0.01 % (0.1 MG/GRAM) VAGINAL CREAM    Place 1 g vaginally twice a " week.    FENOFIBRATE (TRICOR) 145 MG TABLET    Take 1 tablet (145 mg total) by mouth once daily.    FLASH GLUCOSE SCANNING READER (FREESTYLE AMY 2 READER) MISC    Use as directed to check blood sugar    FLU VACC [id number] 6MOS UP,PF, 60 MCG (15 MCG X 4)/0.5 ML SYRG    Inject 0.5 mLs into the muscle once. for 1 dose    FLUTICASONE PROPIONATE (FLONASE) 50 MCG/ACTUATION NASAL SPRAY    2 sprays (100 mcg total) by Each Nare route once daily.    FROVATRIPTAN (FROVA) 2.5 MG TABLET    Take 1 tablet at onset of acute migraine. May take 1 more tablet 2 hours later if needed. (MAX 2 TABLET PER DAY. MAX 4 TABLETS PER WEEK.)    FUROSEMIDE (LASIX) 20 MG TABLET    Take 1 tablet (20 mg total) by mouth once daily.    GLUCAGON (BAQSIMI) 3 MG/ACTUATION SPRY    1 spray by Nasal route as needed (hypoglycemia). For emergency use. May repeat 1 time after 15 minutes    INSULIN ASPART U-100 (NOVOLOG FLEXPEN U-100 INSULIN) 100 UNIT/ML (3 ML) INPN PEN    Inject 10 Units into the skin 3 (three) times daily before meals. Use per sliding scale up to 10 units 3 times a day before meals    LAMOTRIGINE (LAMICTAL) 150 MG TAB    Take 1 tablet (150 mg total) by mouth every morning.    LANCETS (ONETOUCH DELICA PLUS LANCET) 30 GAUGE MISC    Use as directed 3 times daily    LEVOCETIRIZINE (XYZAL) 5 MG TABLET    Take 1 tablet (5 mg total) by mouth every evening.    LINACLOTIDE (LINZESS) 290 MCG CAP CAPSULE    Take 1 capsule (290 mcg total) by mouth before breakfast.    LURASIDONE (LATUDA) 80 MG TAB TABLET    Take 1 tablet (80 mg total) by mouth once daily.    MAGNESIUM OXIDE (MAG-OX) 400 MG (241.3 MG MAGNESIUM) TABLET    Take 1 tablet (400 mg total) by mouth once daily.    METFORMIN (GLUCOPHAGE) 1000 MG TABLET    Take 1 tablet (1,000 mg total) by mouth 2 (two) times daily with meals.    METOPROLOL SUCCINATE (TOPROL-XL) 50 MG 24 HR TABLET    Take 1 tablet (50 mg total) by mouth every evening.    MIRTAZAPINE (REMERON) 15 MG TABLET    Take 1 tablet by  "mouth at bedtime.    MONTELUKAST (SINGULAIR) 10 MG TABLET    Take 10 mg by mouth.    OMEPRAZOLE (PRILOSEC) 40 MG CAPSULE    Take 1 capsule (40 mg total) by mouth once daily.    PEN NEEDLE, DIABETIC (BD ULTRA-FINE MINI PEN NEEDLE) 31 GAUGE X 3/16" NDLE    Use one needle 5 times a day    PEN NEEDLE, DIABETIC (BD ULTRA-FINE MINI PEN NEEDLE) 31 GAUGE X 3/16" NDLE    Use 5 a day    PNEUMOC 20-RAY CONJ-DIP CR,PF, (PREVNAR-20, PF,) 0.5 ML SYRG INJECTION    Inject 0.5 mLs into the muscle.    POLYETHYLENE GLYCOL (GLYCOLAX) 17 GRAM PWPK    Take 1 packet (17 g) by mouth 2 (two) times daily.    PREDNISONE (DELTASONE) 10 MG TABLET    Take 1 tablet (10 mg total) by mouth once daily.    PROMETHAZINE (PHENERGAN) 12.5 MG TAB        PRUCALOPRIDE (MOTEGRITY) 2 MG TAB    Take 1 tablet (2 mg) by mouth once daily.    SECUKINUMAB (COSENTYX PEN, 2 PENS,) 150 MG/ML PNIJ    Inject 300 mg into the skin every 28 days.    SEMAGLUTIDE (OZEMPIC) 2 MG/DOSE (8 MG/3 ML) PNIJ    Inject 2 mg into the skin every 7 days.    SPIRONOLACTONE (ALDACTONE) 25 MG TABLET    Take 1 tablet (25 mg total) by mouth once daily.    TIZANIDINE (ZANAFLEX) 4 MG TABLET    Take 2 tablets by mouth every 6 hours as needed        Return to Clinic: 3 months    Time spent with pt including note preparation and paperwork: 45 minutes       Mariam Young, PhD, MP  Advanced Practice Medical Psychologist  [facility]  [id number] [place] [address].  [place] [address]  [phone]   [phone] fax                    "

## 2023-10-10 NOTE — TELEPHONE ENCOUNTER
Spoke to the patient an was able to get her scheduled for today at 10AM the patient verbalized understanding       ----- Message from Britni Viatle MA sent at 10/9/2023  4:07 PM CDT -----    ----- Message -----  From: Mohini Wyman PA-C  Sent: 10/9/2023   1:58 PM CDT  To: [facility]ozada Staff    [facility] barker for f/u CTS bilat hands.  Mohini Wyman PA-C  [facility]  [facility]

## 2023-10-10 NOTE — TELEPHONE ENCOUNTER
Spoke to the patient an was able to get her rescheduled for the next available appointment and added to the wait list as well since she was not able o make it to her appointment today         ----- Message from Emi Bui sent at 10/10/2023  9:43 AM CDT -----  Contact: [phone]  Pt was suppose to be seen today at 10 AM but states she cannot make her appt. She called at 945 to ask to cancel.  She would like a call to discuss being seen later today or tomorrow. Please advise.

## 2023-10-10 NOTE — PATIENT INSTRUCTIONS

## 2023-10-10 NOTE — TELEPHONE ENCOUNTER
"Therapist: Feroz Stanley LCSW with [facility] ?  Ph: [phone]  Fax: [phone]    Called to coordinate treatment but got voicemail and left a message to call back "regarding a mutual patient"  "

## 2023-10-17 DIAGNOSIS — E11.65 TYPE 2 DIABETES MELLITUS WITH HYPERGLYCEMIA, WITH LONG-TERM CURRENT USE OF INSULIN: ICD-10-CM

## 2023-10-17 DIAGNOSIS — Z79.4 TYPE 2 DIABETES MELLITUS WITH HYPERGLYCEMIA, WITH LONG-TERM CURRENT USE OF INSULIN: ICD-10-CM

## 2023-10-17 RX ORDER — SEMAGLUTIDE 2.68 MG/ML
2 INJECTION, SOLUTION SUBCUTANEOUS
Qty: 3 ML | Refills: 5 | Status: SHIPPED | OUTPATIENT
Start: 2023-10-17 | End: 2023-12-07

## 2023-10-24 ENCOUNTER — TELEPHONE (OUTPATIENT)
Dept: DIABETES | Facility: CLINIC | Age: 53
End: 2023-10-24
Payer: MEDICAID

## 2023-10-24 NOTE — TELEPHONE ENCOUNTER
Patient called returned. Patient is requesting dexcom G7, request has been sent to provider. Patient voiced understanding.  ----- Message from Susan Matta sent at 10/24/2023  1:45 PM CDT -----  Contact: [phone]  Pt is calling regarding dexcom 7 . Please call back at [phone] . Thanksdarvinj

## 2023-10-25 DIAGNOSIS — E11.65 TYPE 2 DIABETES MELLITUS WITH HYPERGLYCEMIA, WITH LONG-TERM CURRENT USE OF INSULIN: ICD-10-CM

## 2023-10-25 DIAGNOSIS — Z79.4 TYPE 2 DIABETES MELLITUS WITH HYPERGLYCEMIA, WITH LONG-TERM CURRENT USE OF INSULIN: ICD-10-CM

## 2023-10-25 RX ORDER — INSULIN ASPART 100 [IU]/ML
10 INJECTION, SOLUTION INTRAVENOUS; SUBCUTANEOUS
Qty: 15 ML | Refills: 3 | Status: SHIPPED | OUTPATIENT
Start: 2023-10-25 | End: 2024-01-10 | Stop reason: SDUPTHER

## 2023-10-26 RX ORDER — MIRTAZAPINE 15 MG/1
TABLET, FILM COATED ORAL
Qty: 30 TABLET | Refills: 0 | Status: SHIPPED | OUTPATIENT
Start: 2023-10-26 | End: 2023-11-01 | Stop reason: SDUPTHER

## 2023-11-01 DIAGNOSIS — F41.1 GENERALIZED ANXIETY DISORDER: Chronic | ICD-10-CM

## 2023-11-01 DIAGNOSIS — F25.0 SCHIZOAFFECTIVE DISORDER, BIPOLAR TYPE: Chronic | ICD-10-CM

## 2023-11-01 NOTE — TELEPHONE ENCOUNTER
She stated she will go to the [facility] on Friday. I informed her of what you stated.

## 2023-11-02 RX ORDER — MIRTAZAPINE 15 MG/1
TABLET, FILM COATED ORAL
Qty: 30 TABLET | Refills: 1 | Status: SHIPPED | OUTPATIENT
Start: 2023-11-02 | End: 2024-02-21 | Stop reason: SDUPTHER

## 2023-11-02 RX ORDER — LURASIDONE HYDROCHLORIDE 80 MG/1
80 TABLET, FILM COATED ORAL DAILY
Qty: 30 TABLET | Refills: 2 | OUTPATIENT
Start: 2023-11-02 | End: 2024-01-31

## 2023-11-02 RX ORDER — ALPRAZOLAM 1 MG/1
1 TABLET ORAL 2 TIMES DAILY PRN
Qty: 60 TABLET | Refills: 1 | Status: SHIPPED | OUTPATIENT
Start: 2023-11-02 | End: 2024-01-22 | Stop reason: SDUPTHER

## 2023-11-21 DIAGNOSIS — E55.9 VITAMIN D DEFICIENCY: Primary | ICD-10-CM

## 2023-11-21 NOTE — TELEPHONE ENCOUNTER
Refill Routing Note   Medication(s) are not appropriate for processing by [facility] for the following reason(s):        Outside of protocol    [facility] action(s):  Route               Appointments  past 12m or future 3m with PCP    Date Provider   Last Visit   9/12/2023 Sasha Sorenson MD   Next Visit   Visit date not found Sasha Sorenson MD   ED visits in past 90 days: 0        Note composed:9:21 AM 11/21/2023

## 2023-11-22 RX ORDER — ERGOCALCIFEROL 1.25 MG/1
CAPSULE ORAL
Qty: 12 CAPSULE | Refills: 3 | Status: SHIPPED | OUTPATIENT
Start: 2023-11-22

## 2023-11-24 ENCOUNTER — OFFICE VISIT (OUTPATIENT)
Dept: ORTHOPEDICS | Facility: CLINIC | Age: 53
End: 2023-11-24
Payer: MEDICAID

## 2023-11-24 ENCOUNTER — PATIENT MESSAGE (OUTPATIENT)
Dept: ORTHOPEDICS | Facility: CLINIC | Age: 53
End: 2023-11-24

## 2023-11-24 VITALS — BODY MASS INDEX: 38.69 KG/M2 | WEIGHT: 172 LBS | HEIGHT: 56 IN

## 2023-11-24 DIAGNOSIS — G56.03 BILATERAL CARPAL TUNNEL SYNDROME: Primary | ICD-10-CM

## 2023-11-24 DIAGNOSIS — Z01.818 PREOP TESTING: ICD-10-CM

## 2023-11-24 PROCEDURE — 3072F PR LOW RISK FOR RETINOPATHY: ICD-10-PCS | Mod: CPTII,,, | Performed by: ORTHOPAEDIC SURGERY

## 2023-11-24 PROCEDURE — 4010F ACE/ARB THERAPY RXD/TAKEN: CPT | Mod: CPTII,,, | Performed by: ORTHOPAEDIC SURGERY

## 2023-11-24 PROCEDURE — 1160F PR REVIEW ALL MEDS BY PRESCRIBER/CLIN PHARMACIST DOCUMENTED: ICD-10-PCS | Mod: CPTII,,, | Performed by: ORTHOPAEDIC SURGERY

## 2023-11-24 PROCEDURE — 1160F RVW MEDS BY RX/DR IN RCRD: CPT | Mod: CPTII,,, | Performed by: ORTHOPAEDIC SURGERY

## 2023-11-24 PROCEDURE — 1159F MED LIST DOCD IN RCRD: CPT | Mod: CPTII,,, | Performed by: ORTHOPAEDIC SURGERY

## 2023-11-24 PROCEDURE — 4010F PR ACE/ARB THEARPY RXD/TAKEN: ICD-10-PCS | Mod: CPTII,,, | Performed by: ORTHOPAEDIC SURGERY

## 2023-11-24 PROCEDURE — 3008F BODY MASS INDEX DOCD: CPT | Mod: CPTII,,, | Performed by: ORTHOPAEDIC SURGERY

## 2023-11-24 PROCEDURE — 3072F LOW RISK FOR RETINOPATHY: CPT | Mod: CPTII,,, | Performed by: ORTHOPAEDIC SURGERY

## 2023-11-24 PROCEDURE — 99214 PR OFFICE/OUTPT VISIT, EST, LEVL IV, 30-39 MIN: ICD-10-PCS | Mod: S$PBB,,, | Performed by: ORTHOPAEDIC SURGERY

## 2023-11-24 PROCEDURE — 1159F PR MEDICATION LIST DOCUMENTED IN MEDICAL RECORD: ICD-10-PCS | Mod: CPTII,,, | Performed by: ORTHOPAEDIC SURGERY

## 2023-11-24 PROCEDURE — 3044F PR MOST RECENT HEMOGLOBIN A1C LEVEL <7.0%: ICD-10-PCS | Mod: CPTII,,, | Performed by: ORTHOPAEDIC SURGERY

## 2023-11-24 PROCEDURE — 3008F PR BODY MASS INDEX (BMI) DOCUMENTED: ICD-10-PCS | Mod: CPTII,,, | Performed by: ORTHOPAEDIC SURGERY

## 2023-11-24 PROCEDURE — 99214 OFFICE O/P EST MOD 30 MIN: CPT | Mod: S$PBB,,, | Performed by: ORTHOPAEDIC SURGERY

## 2023-11-24 PROCEDURE — 99999 PR PBB SHADOW E&M-EST. PATIENT-LVL IV: CPT | Mod: PBBFAC,,, | Performed by: ORTHOPAEDIC SURGERY

## 2023-11-24 PROCEDURE — 3044F HG A1C LEVEL LT 7.0%: CPT | Mod: CPTII,,, | Performed by: ORTHOPAEDIC SURGERY

## 2023-11-24 PROCEDURE — 99214 OFFICE O/P EST MOD 30 MIN: CPT | Mod: PBBFAC | Performed by: ORTHOPAEDIC SURGERY

## 2023-11-24 PROCEDURE — 99999 PR PBB SHADOW E&M-EST. PATIENT-LVL IV: ICD-10-PCS | Mod: PBBFAC,,, | Performed by: ORTHOPAEDIC SURGERY

## 2023-11-24 NOTE — PROGRESS NOTES
Subjective:     Patient ID: Yolanda Betts is a 53 y.o. female.    Chief Complaint: Pain and Numbness of the Right Hand and Pain and Numbness of the Left Hand      HPI:  The patient is a 53-year-old female with bilateral carpal tunnel syndrome documented by nerve conduction studies 2020.  She is tried splinting and injection with improvement but wishes to have bilateral carpal tunnel release    Past Medical History:   Diagnosis Date    Abnormal Pap smear of cervix     HPV genital warts    Anemia     Anxiety     Arthritis     Asthma 10/11/2016    Bipolar 1 disorder     Diabetes mellitus, type 2     Dyslipidemia associated with type 2 diabetes mellitus 2019    General anesthetics causing adverse effect in therapeutic use     Genital warts     GERD (gastroesophageal reflux disease)     Herpes simplex virus (HSV) infection     Hyperlipidemia     Hypertension     Hypertension complicating diabetes 2019    Migraine with aura and without status migrainosus, not intractable 3/21/2016    Mild persistent asthma without complication 10/11/2016    Morbid obesity with body mass index (BMI) of 45.0 to 49.9 in adult 8/3/2017    Obstructive sleep apnea     ALEN (obstructive sleep apnea)     2L per N/C q HS    Schizoaffective disorder, bipolar type 2019    Seasonal allergic rhinitis due to pollen 2019    Type 2 diabetes mellitus with hyperglycemia, with long-term current use of insulin 2017    Type 2 diabetes mellitus with hyperglycemia, without long-term current use of insulin 2017     Past Surgical History:   Procedure Laterality Date    abcess removed right back      BELT ABDOMINOPLASTY      BREAST SURGERY Bilateral 1996    Reduction     SECTION      X 2    COLONOSCOPY      COLONOSCOPY N/A 2018    Procedure: colonoscopy for iron deficiency anemia;  Surgeon: Frankie Sanchez MD;  Location: [facility];  Service: Endoscopy;  Laterality: N/A;    COLONOSCOPY N/A 2018     "Procedure: COLONOSCOPY;  Surgeon: Frankie Sanchez MD;  Location: [facility], [place] ENDO;  Service: Endoscopy;  Laterality: N/A;    COLONOSCOPY N/A 3/10/2023    Procedure: COLONOSCOPY;  Surgeon: Lalita Montes De Oca MD;  Location: [facility] [place] ENDO;  Service: Endoscopy;  Laterality: N/A;    EPIDURAL STEROID INJECTION INTO CERVICAL SPINE N/A 1/31/2023    Procedure: C6/7 IL ROCAEL RN IV Sedation;  Surgeon: Otto Phillips MD;  Location: [facility];  Service: Pain Management;  Laterality: N/A;    ESOPHAGOGASTRODUODENOSCOPY N/A 3/10/2023    Procedure: EGD (ESOPHAGOGASTRODUODENOSCOPY);  Surgeon: Lalita Montes De Oca MD;  Location: [facility] [place] ENDO;  Service: Endoscopy;  Laterality: N/A;    HYSTERECTOMY      w/ BSO; hypermenorrhea    MOUTH SURGERY  1996    OOPHORECTOMY      hyst/bso, hypermenorrhea    ROBOT-ASSISTED CHOLECYSTECTOMY USING DA DARI XI N/A 1/3/2020    Procedure: XI ROBOTIC CHOLECYSTECTOMY;  Surgeon: Damir Driscoll MD;  Location: [facility] [place] OR;  Service: General;  Laterality: N/A;    TOTAL REDUCTION MAMMOPLASTY      TUBAL LIGATION       Family History   Problem Relation Age of Onset    Diabetes Mother     Hyperlipidemia Mother     Hypertension Mother     Asthma Mother     COPD Mother     Glaucoma Mother     Thyroid disease Mother     Anesthesia problems Mother         "almost had a cardiac arrest" , blood clots    Hypertension Father     Hyperlipidemia Father     Cancer Father         Brain, lung, liver, kidney    Heart disease Maternal Grandmother     Hyperlipidemia Maternal Grandmother     Hypertension Maternal Grandmother     Cataracts Maternal Grandmother     Diabetes Maternal Grandmother     Heart disease Maternal Grandfather     Hyperlipidemia Maternal Grandfather     Hypertension Maternal Grandfather     Glaucoma Maternal Grandfather     Cancer Maternal Grandfather     Cataracts Maternal Grandfather     Macular degeneration Maternal Grandfather     Diabetes Maternal Grandfather     Heart disease Paternal Grandmother     " Hyperlipidemia Paternal Grandmother     Hypertension Paternal Grandmother     Cataracts Paternal Grandmother     Heart disease Paternal Grandfather     Hyperlipidemia Paternal Grandfather     Hypertension Paternal Grandfather     Cataracts Paternal Grandfather     Breast cancer Maternal Cousin     Breast cancer Maternal Cousin     Breast cancer Maternal Cousin     Breast cancer Maternal Cousin      Social History     Socioeconomic History    Marital status: Single   Tobacco Use    Smoking status: Never    Smokeless tobacco: Never   Substance and Sexual Activity    Alcohol use: Yes     Alcohol/week: 0.0 standard drinks of alcohol     Comment: socially  No alcohol 72h prior to sx    Drug use: No    Sexual activity: Yes     Partners: Male     Birth control/protection: Surgical     Comment: hyst   Social History Narrative    Long-term care nurse     Social Determinants of Health     Financial Resource Strain: High Risk (2/1/2023)    Overall Financial Resource Strain (CARDIA)     Difficulty of Paying Living Expenses: Very hard   Food Insecurity: Food Insecurity Present (2/1/2023)    Hunger Vital Sign     Worried About Running Out of Food in the Last Year: Sometimes true     Ran Out of Food in the Last Year: Sometimes true   Transportation Needs: No Transportation Needs (2/1/2023)    PRAPARE - Transportation     Lack of Transportation (Medical): No     Lack of Transportation (Non-Medical): No   Physical Activity: Inactive (2/1/2023)    Exercise Vital Sign     Days of Exercise per Week: 0 days     Minutes of Exercise per Session: 0 min   Stress: Stress Concern Present (2/1/2023)    [employer] - Occupational Stress Questionnaire     Feeling of Stress : Very much   Social Connections: Moderately Isolated (2/1/2023)    Social Connection and Isolation Panel [NHANES]     Frequency of Communication with Friends and Family: More than three times a week     Frequency of Social Gatherings with Friends  "and Family: More than three times a week     Attends Spiritism Services: More than 4 times per year     Active Member of Clubs or Organizations: No     Attends Club or Organization Meetings: Never     Marital Status:    Housing Stability: High Risk (2/1/2023)    Housing Stability Vital Sign     Unable to Pay for Housing in the Last Year: Yes     Number of Places Lived in the Last Year: 1     Unstable Housing in the Last Year: No     Medication List with Changes/Refills   Current Medications    ALPRAZOLAM (XANAX) 1 MG TABLET    Take 1 tablet (1 mg total) by mouth 2 (two) times daily as needed for Anxiety.    AMLODIPINE-VALSARTAN (EXFORGE)  MG PER TABLET    Take 1 tablet by mouth once daily.    ASPIRIN (ECOTRIN) 81 MG EC TABLET    Take 1 tablet by mouth daily    ATORVASTATIN (LIPITOR) 20 MG TABLET    Take 1 tablet (20 mg total) by mouth every evening.    BD INSULIN SYRINGE ULTRA-FINE 1/2 ML 30 GAUGE X 1/2" SYRG        BLOOD SUGAR DIAGNOSTIC (ONETOUCH ULTRA TEST) STRP    1 each by Misc.(Non-Drug; Combo Route) route 4 (four) times daily. [facility] Glucose Test Strips    BLOOD SUGAR DIAGNOSTIC (ONETOUCH ULTRA TEST) STRP    Check blood glucose 4 times daily as directed and as needed (dispense insurance preferred brand or patient choice)    CYCLOSPORINE 0.05 % DROP    Place 1 drop into both eyes 2 (two) times daily.    DULOXETINE (CYMBALTA) 60 MG CAPSULE    Take 1 capsule (60 mg total) by mouth 2 (two) times daily.    ERGOCALCIFEROL (VITAMIN D2) 50,000 UNIT CAP    Take 1 capsule twice weekly for 3 weeks then weekly    ESTRADIOL (ESTRACE) 0.01 % (0.1 MG/GRAM) VAGINAL CREAM    Place 1 g vaginally twice a week.    FENOFIBRATE (TRICOR) 145 MG TABLET    Take 1 tablet (145 mg total) by mouth once daily.    FLASH GLUCOSE SCANNING READER ([employer] AMY 2 READER) [facility]    Use as directed to check blood sugar    FLUTICASONE PROPIONATE (FLONASE) 50 MCG/ACTUATION NASAL SPRAY    2 sprays (100 mcg total) by Each Nare route " "once daily.    FROVATRIPTAN (FROVA) 2.5 MG TABLET    Take 1 tablet at onset of acute migraine. May take 1 more tablet 2 hours later if needed. (MAX 2 TABLET PER DAY. MAX 4 TABLETS PER WEEK.)    FUROSEMIDE (LASIX) 20 MG TABLET    Take 1 tablet (20 mg total) by mouth once daily.    GLUCAGON (BAQSIMI) 3 MG/ACTUATION SPRY    1 spray by Nasal route as needed (hypoglycemia). For emergency use. May repeat 1 time after 15 minutes    INSULIN ASPART U-100 (NOVOLOG FLEXPEN U-100 INSULIN) 100 UNIT/ML (3 ML) INPN PEN    Inject 10 Units into the skin 3 (three) times daily before meals. Use per sliding scale up to 10 units 3 times a day before meals    LAMOTRIGINE (LAMICTAL) 150 MG TAB    Take 1 tablet (150 mg total) by mouth every morning.    LANCETS (ONETOUCH DELICA PLUS LANCET) 30 GAUGE MISC    Use as directed 3 times daily    LEVOCETIRIZINE (XYZAL) 5 MG TABLET    Take 1 tablet (5 mg total) by mouth every evening.    LINACLOTIDE (LINZESS) 290 MCG CAP CAPSULE    Take 1 capsule (290 mcg total) by mouth before breakfast.    LURASIDONE (LATUDA) 80 MG TAB TABLET    Take 1 tablet (80 mg total) by mouth once daily.    MAGNESIUM OXIDE (MAG-OX) 400 MG (241.3 MG MAGNESIUM) TABLET    Take 1 tablet (400 mg total) by mouth once daily.    METFORMIN (GLUCOPHAGE) 1000 MG TABLET    Take 1 tablet (1,000 mg total) by mouth 2 (two) times daily with meals.    METOPROLOL SUCCINATE (TOPROL-XL) 50 MG 24 HR TABLET    Take 1 tablet (50 mg total) by mouth every evening.    MIRTAZAPINE (REMERON) 15 MG TABLET    Take 1 tablet by mouth at bedtime.    MONTELUKAST (SINGULAIR) 10 MG TABLET    Take 10 mg by mouth.    OMEPRAZOLE (PRILOSEC) 40 MG CAPSULE    Take 1 capsule (40 mg total) by mouth once daily.    PEN NEEDLE, DIABETIC (BD ULTRA-FINE MINI PEN NEEDLE) 31 GAUGE X 3/16" NDLE    Use one needle 5 times a day    PEN NEEDLE, DIABETIC (BD ULTRA-FINE MINI PEN NEEDLE) 31 GAUGE X 3/16" NDLE    Use 5 a day    PNEUMOC 20-RAY CONJ-DIP CR,PF, (PREVNAR-20, PF,) 0.5 ML " SYRG INJECTION    Inject 0.5 mLs into the muscle.    PREDNISONE (DELTASONE) 10 MG TABLET    Take 1 tablet (10 mg total) by mouth once daily.    PROMETHAZINE (PHENERGAN) 12.5 MG TAB        PRUCALOPRIDE (MOTEGRITY) 2 MG TAB    Take 1 tablet (2 mg) by mouth once daily.    SECUKINUMAB (COSENTYX PEN, 2 PENS,) 150 MG/ML PNIJ    Inject 300 mg (2 mL) into the skin every 28 days.    SEMAGLUTIDE (OZEMPIC) 2 MG/DOSE (8 MG/3 ML) PNIJ    Inject 2 mg into the skin every 7 days.    SPIRONOLACTONE (ALDACTONE) 25 MG TABLET    Take 1 tablet (25 mg total) by mouth once daily.    TIZANIDINE (ZANAFLEX) 4 MG TABLET    Take 2 tablets by mouth every 6 hours as needed     Review of patient's allergies indicates:   Allergen Reactions    Codeine Itching    Hydromorphone Other (See Comments)     Can't wake up for long time if taken  Slow to wake up after surgery after receiving    Aleve [naproxen sodium]     Lyrica [pregabalin] Itching    Motrin [ibuprofen]     Neuromuscular blockers, steroidal Hives     some    Latex, natural rubber Rash    Morphine Rash     itching    Norco [hydrocodone-acetaminophen] Itching, Rash and Hallucinations    Seconal [secobarbital sodium] Rash     itching    Tylox [oxycodone-acetaminophen] Rash     Review of Systems   Constitutional: Negative for malaise/fatigue.   HENT:  Negative for hearing loss.    Eyes:  Negative for double vision and visual disturbance.   Cardiovascular:  Negative for chest pain.   Respiratory:  Positive for shortness of breath, sleep disturbances due to breathing and wheezing.    Endocrine: Negative for cold intolerance.   Hematologic/Lymphatic: Does not bruise/bleed easily.   Skin:  Negative for poor wound healing and suspicious lesions.   Musculoskeletal:  Positive for back pain and neck pain. Negative for gout, joint pain and joint swelling.   Gastrointestinal:  Positive for constipation and diarrhea. Negative for nausea and vomiting.   Genitourinary:  Negative for dysuria.    Neurological:  Positive for focal weakness, headaches, numbness, paresthesias and sensory change.   Psychiatric/Behavioral:  Positive for altered mental status. Negative for depression, memory loss and substance abuse. The patient is nervous/anxious.    Allergic/Immunologic: Negative for persistent infections.       Objective:   Body mass index is 38.56 kg/m².  There were no vitals filed for this visit.             General    Constitutional: She is oriented to person, place, and time. She appears well-developed and well-nourished. No distress.   HENT:   Head: Normocephalic.   Mouth/Throat: Oropharynx is clear and moist.   Eyes: EOM are normal.   Cardiovascular:  Normal rate.            Pulmonary/Chest: Effort normal.   Abdominal: Soft.   Neurological: She is alert and oriented to person, place, and time. No cranial nerve deficit.   Psychiatric: She has a normal mood and affect.             Right Hand/Wrist Exam     Inspection   Scars: Wrist - absent Hand -  absent  Effusion: Wrist - absent Hand -  absent    Pain   Wrist - The patient exhibits pain of the flexor/pronator group.    Tests   Phalens sign: positive  Tinel's sign (median nerve): positive  Carpal Tunnel Compression Test: positive    Atrophy   Thenar:  negative  Intrinsic:  negative    Other     Neuorologic Exam    Median Distribution: abnormal  Ulnar Distribution: normal  Radial Distribution: normal    Comments:  The patient has a positive Tinel and positive Phalen sign.  There is no thenar atrophy noted.      Left Hand/Wrist Exam     Inspection   Scars: Wrist - absent Hand -  absent  Effusion: Wrist - absent Hand -  absent    Pain   Wrist - The patient exhibits pain of the flexor/pronator group.    Tests   Phalens sign: positive  Tinel's sign (median nerve): positive  Carpal Tunnel Compression Test: positive    Atrophy  Thenar:  Negative  Intrinsic: negative    Other     Sensory Exam  Median Distribution: abnormal  Ulnar Distribution: normal  Radial  Distribution: normal    Comments:  The patient has a positive Tinel and positive Phalen sign.  There is no thenar atrophy noted.          Vascular Exam       Capillary Refill  Right Hand: normal capillary refill  Left Hand: normal capillary refill      radiographs were not obtained today  Assessment:     Encounter Diagnosis   Name Primary?    Bilateral carpal tunnel syndrome Yes        Plan:     The patient was counseled regarding bilateral carpal tunnel release.  She wishes to do both at once.  Risk complications and alternatives were discussed including the risk of infection, anesthetic risk, to nerves and vessels, loss of motion, and possible need for additional surgeries were discussed.  She seems to understand and agree to that surgery.  All questions were answered.                Disclaimer: This note was prepared using a voice recognition system and is likely to have sound alike errors within the text.

## 2023-11-24 NOTE — H&P (VIEW-ONLY)
Subjective:     Patient ID: Yolanda Betts is a 53 y.o. female.    Chief Complaint: Pain and Numbness of the Right Hand and Pain and Numbness of the Left Hand      HPI:  The patient is a 53-year-old female with bilateral carpal tunnel syndrome documented by nerve conduction studies 2020.  She is tried splinting and injection with improvement but wishes to have bilateral carpal tunnel release    Past Medical History:   Diagnosis Date    Abnormal Pap smear of cervix     HPV genital warts    Anemia     Anxiety     Arthritis     Asthma 10/11/2016    Bipolar 1 disorder     Diabetes mellitus, type 2     Dyslipidemia associated with type 2 diabetes mellitus 2019    General anesthetics causing adverse effect in therapeutic use     Genital warts     GERD (gastroesophageal reflux disease)     Herpes simplex virus (HSV) infection     Hyperlipidemia     Hypertension     Hypertension complicating diabetes 2019    Migraine with aura and without status migrainosus, not intractable 3/21/2016    Mild persistent asthma without complication 10/11/2016    Morbid obesity with body mass index (BMI) of 45.0 to 49.9 in adult 8/3/2017    Obstructive sleep apnea     ALEN (obstructive sleep apnea)     2L per N/C q HS    Schizoaffective disorder, bipolar type 2019    Seasonal allergic rhinitis due to pollen 2019    Type 2 diabetes mellitus with hyperglycemia, with long-term current use of insulin 2017    Type 2 diabetes mellitus with hyperglycemia, without long-term current use of insulin 2017     Past Surgical History:   Procedure Laterality Date    abcess removed right back      BELT ABDOMINOPLASTY      BREAST SURGERY Bilateral 1996    Reduction     SECTION      X 2    COLONOSCOPY      COLONOSCOPY N/A 2018    Procedure: colonoscopy for iron deficiency anemia;  Surgeon: Frankie Sanchez MD;  Location: [facility];  Service: Endoscopy;  Laterality: N/A;    COLONOSCOPY N/A 2018     "Procedure: COLONOSCOPY;  Surgeon: Frankie Sanchez MD;  Location: [facility] ENDO;  Service: Endoscopy;  Laterality: N/A;    COLONOSCOPY N/A 3/10/2023    Procedure: COLONOSCOPY;  Surgeon: Lalita Montes De Oca MD;  Location: [facility] ENDO;  Service: Endoscopy;  Laterality: N/A;    EPIDURAL STEROID INJECTION INTO CERVICAL SPINE N/A 1/31/2023    Procedure: C6/7 IL ROCAEL RN IV Sedation;  Surgeon: Otto Phillips MD;  Location: [facility] PAIN MGT;  Service: Pain Management;  Laterality: N/A;    ESOPHAGOGASTRODUODENOSCOPY N/A 3/10/2023    Procedure: EGD (ESOPHAGOGASTRODUODENOSCOPY);  Surgeon: Lalita Montes De Oca MD;  Location: [facility] ENDO;  Service: Endoscopy;  Laterality: N/A;    HYSTERECTOMY      w/ BSO; hypermenorrhea    MOUTH SURGERY  1996    OOPHORECTOMY      hyst/bso, hypermenorrhea    ROBOT-ASSISTED CHOLECYSTECTOMY USING DA DARI XI N/A 1/3/2020    Procedure: XI ROBOTIC CHOLECYSTECTOMY;  Surgeon: Damir Driscoll MD;  Location: [facility] OR;  Service: General;  Laterality: N/A;    TOTAL REDUCTION MAMMOPLASTY      TUBAL LIGATION       Family History   Problem Relation Age of Onset    Diabetes Mother     Hyperlipidemia Mother     Hypertension Mother     Asthma Mother     COPD Mother     Glaucoma Mother     Thyroid disease Mother     Anesthesia problems Mother         "almost had a cardiac arrest" , blood clots    Hypertension Father     Hyperlipidemia Father     Cancer Father         Brain, lung, liver, kidney    Heart disease Maternal Grandmother     Hyperlipidemia Maternal Grandmother     Hypertension Maternal Grandmother     Cataracts Maternal Grandmother     Diabetes Maternal Grandmother     Heart disease Maternal Grandfather     Hyperlipidemia Maternal Grandfather     Hypertension Maternal Grandfather     Glaucoma Maternal Grandfather     Cancer Maternal Grandfather     Cataracts Maternal Grandfather     Macular degeneration Maternal Grandfather     Diabetes Maternal Grandfather     Heart disease Paternal Grandmother     " Hyperlipidemia Paternal Grandmother     Hypertension Paternal Grandmother     Cataracts Paternal Grandmother     Heart disease Paternal Grandfather     Hyperlipidemia Paternal Grandfather     Hypertension Paternal Grandfather     Cataracts Paternal Grandfather     Breast cancer Maternal Cousin     Breast cancer Maternal Cousin     Breast cancer Maternal Cousin     Breast cancer Maternal Cousin      Social History     Socioeconomic History    Marital status: Single   Tobacco Use    Smoking status: Never    Smokeless tobacco: Never   Substance and Sexual Activity    Alcohol use: Yes     Alcohol/week: 0.0 standard drinks of alcohol     Comment: socially  No alcohol 72h prior to sx    Drug use: No    Sexual activity: Yes     Partners: Male     Birth control/protection: Surgical     Comment: hyst   Social History Narrative    Long-term care nurse     Social Determinants of Health     Financial Resource Strain: High Risk (2/1/2023)    Overall Financial Resource Strain (CARDIA)     Difficulty of Paying Living Expenses: Very hard   Food Insecurity: Food Insecurity Present (2/1/2023)    Hunger Vital Sign     Worried About Running Out of Food in the Last Year: Sometimes true     Ran Out of Food in the Last Year: Sometimes true   Transportation Needs: No Transportation Needs (2/1/2023)    PRAPARE - Transportation     Lack of Transportation (Medical): No     Lack of Transportation (Non-Medical): No   Physical Activity: Inactive (2/1/2023)    Exercise Vital Sign     Days of Exercise per Week: 0 days     Minutes of Exercise per Session: 0 min   Stress: Stress Concern Present (2/1/2023)    [employer] - Occupational Stress Questionnaire     Feeling of Stress : Very much   Social Connections: Moderately Isolated (2/1/2023)    Social Connection and Isolation Panel [NHANES]     Frequency of Communication with Friends and Family: More than three times a week     Frequency of Social Gatherings with Friends  "and Family: More than three times a week     Attends Yarsani Services: More than 4 times per year     Active Member of Clubs or Organizations: No     Attends Club or Organization Meetings: Never     Marital Status:    Housing Stability: High Risk (2/1/2023)    Housing Stability Vital Sign     Unable to Pay for Housing in the Last Year: Yes     Number of Places Lived in the Last Year: 1     Unstable Housing in the Last Year: No     Medication List with Changes/Refills   Current Medications    ALPRAZOLAM (XANAX) 1 MG TABLET    Take 1 tablet (1 mg total) by mouth 2 (two) times daily as needed for Anxiety.    AMLODIPINE-VALSARTAN (EXFORGE)  MG PER TABLET    Take 1 tablet by mouth once daily.    ASPIRIN (ECOTRIN) 81 MG EC TABLET    Take 1 tablet by mouth daily    ATORVASTATIN (LIPITOR) 20 MG TABLET    Take 1 tablet (20 mg total) by mouth every evening.    BD INSULIN SYRINGE ULTRA-FINE 1/2 ML 30 GAUGE X 1/2" SYRG        BLOOD SUGAR DIAGNOSTIC (ONETOUCH ULTRA TEST) STRP    1 each by Misc.(Non-Drug; Combo Route) route 4 (four) times daily. [facility] Glucose Test Strips    BLOOD SUGAR DIAGNOSTIC (ONETOUCH ULTRA TEST) STRP    Check blood glucose 4 times daily as directed and as needed (dispense insurance preferred brand or patient choice)    CYCLOSPORINE 0.05 % DROP    Place 1 drop into both eyes 2 (two) times daily.    DULOXETINE (CYMBALTA) 60 MG CAPSULE    Take 1 capsule (60 mg total) by mouth 2 (two) times daily.    ERGOCALCIFEROL (VITAMIN D2) 50,000 UNIT CAP    Take 1 capsule twice weekly for 3 weeks then weekly    ESTRADIOL (ESTRACE) 0.01 % (0.1 MG/GRAM) VAGINAL CREAM    Place 1 g vaginally twice a week.    FENOFIBRATE (TRICOR) 145 MG TABLET    Take 1 tablet (145 mg total) by mouth once daily.    FLASH GLUCOSE SCANNING READER ([employer] AMY 2 READER) [facility]    Use as directed to check blood sugar    FLUTICASONE PROPIONATE (FLONASE) 50 MCG/ACTUATION NASAL SPRAY    2 sprays (100 mcg total) by Each Nare route " "once daily.    FROVATRIPTAN (FROVA) 2.5 MG TABLET    Take 1 tablet at onset of acute migraine. May take 1 more tablet 2 hours later if needed. (MAX 2 TABLET PER DAY. MAX 4 TABLETS PER WEEK.)    FUROSEMIDE (LASIX) 20 MG TABLET    Take 1 tablet (20 mg total) by mouth once daily.    GLUCAGON (BAQSIMI) 3 MG/ACTUATION SPRY    1 spray by Nasal route as needed (hypoglycemia). For emergency use. May repeat 1 time after 15 minutes    INSULIN ASPART U-100 (NOVOLOG FLEXPEN U-100 INSULIN) 100 UNIT/ML (3 ML) INPN PEN    Inject 10 Units into the skin 3 (three) times daily before meals. Use per sliding scale up to 10 units 3 times a day before meals    LAMOTRIGINE (LAMICTAL) 150 MG TAB    Take 1 tablet (150 mg total) by mouth every morning.    LANCETS (ONETOUCH DELICA PLUS LANCET) 30 GAUGE MISC    Use as directed 3 times daily    LEVOCETIRIZINE (XYZAL) 5 MG TABLET    Take 1 tablet (5 mg total) by mouth every evening.    LINACLOTIDE (LINZESS) 290 MCG CAP CAPSULE    Take 1 capsule (290 mcg total) by mouth before breakfast.    LURASIDONE (LATUDA) 80 MG TAB TABLET    Take 1 tablet (80 mg total) by mouth once daily.    MAGNESIUM OXIDE (MAG-OX) 400 MG (241.3 MG MAGNESIUM) TABLET    Take 1 tablet (400 mg total) by mouth once daily.    METFORMIN (GLUCOPHAGE) 1000 MG TABLET    Take 1 tablet (1,000 mg total) by mouth 2 (two) times daily with meals.    METOPROLOL SUCCINATE (TOPROL-XL) 50 MG 24 HR TABLET    Take 1 tablet (50 mg total) by mouth every evening.    MIRTAZAPINE (REMERON) 15 MG TABLET    Take 1 tablet by mouth at bedtime.    MONTELUKAST (SINGULAIR) 10 MG TABLET    Take 10 mg by mouth.    OMEPRAZOLE (PRILOSEC) 40 MG CAPSULE    Take 1 capsule (40 mg total) by mouth once daily.    PEN NEEDLE, DIABETIC (BD ULTRA-FINE MINI PEN NEEDLE) 31 GAUGE X 3/16" NDLE    Use one needle 5 times a day    PEN NEEDLE, DIABETIC (BD ULTRA-FINE MINI PEN NEEDLE) 31 GAUGE X 3/16" NDLE    Use 5 a day    PNEUMOC 20-RAY CONJ-DIP CR,PF, (PREVNAR-20, PF,) 0.5 ML " SYRG INJECTION    Inject 0.5 mLs into the muscle.    PREDNISONE (DELTASONE) 10 MG TABLET    Take 1 tablet (10 mg total) by mouth once daily.    PROMETHAZINE (PHENERGAN) 12.5 MG TAB        PRUCALOPRIDE (MOTEGRITY) 2 MG TAB    Take 1 tablet (2 mg) by mouth once daily.    SECUKINUMAB (COSENTYX PEN, 2 PENS,) 150 MG/ML PNIJ    Inject 300 mg (2 mL) into the skin every 28 days.    SEMAGLUTIDE (OZEMPIC) 2 MG/DOSE (8 MG/3 ML) PNIJ    Inject 2 mg into the skin every 7 days.    SPIRONOLACTONE (ALDACTONE) 25 MG TABLET    Take 1 tablet (25 mg total) by mouth once daily.    TIZANIDINE (ZANAFLEX) 4 MG TABLET    Take 2 tablets by mouth every 6 hours as needed     Review of patient's allergies indicates:   Allergen Reactions    Codeine Itching    Hydromorphone Other (See Comments)     Can't wake up for long time if taken  Slow to wake up after surgery after receiving    Aleve [naproxen sodium]     Lyrica [pregabalin] Itching    Motrin [ibuprofen]     Neuromuscular blockers, steroidal Hives     some    Latex, natural rubber Rash    Morphine Rash     itching    Norco [hydrocodone-acetaminophen] Itching, Rash and Hallucinations    Seconal [secobarbital sodium] Rash     itching    Tylox [oxycodone-acetaminophen] Rash     Review of Systems   Constitutional: Negative for malaise/fatigue.   HENT:  Negative for hearing loss.    Eyes:  Negative for double vision and visual disturbance.   Cardiovascular:  Negative for chest pain.   Respiratory:  Positive for shortness of breath, sleep disturbances due to breathing and wheezing.    Endocrine: Negative for cold intolerance.   Hematologic/Lymphatic: Does not bruise/bleed easily.   Skin:  Negative for poor wound healing and suspicious lesions.   Musculoskeletal:  Positive for back pain and neck pain. Negative for gout, joint pain and joint swelling.   Gastrointestinal:  Positive for constipation and diarrhea. Negative for nausea and vomiting.   Genitourinary:  Negative for dysuria.    Neurological:  Positive for focal weakness, headaches, numbness, paresthesias and sensory change.   Psychiatric/Behavioral:  Positive for altered mental status. Negative for depression, memory loss and substance abuse. The patient is nervous/anxious.    Allergic/Immunologic: Negative for persistent infections.       Objective:   Body mass index is 38.56 kg/m².  There were no vitals filed for this visit.             General    Constitutional: She is oriented to person, place, and time. She appears well-developed and well-nourished. No distress.   HENT:   Head: Normocephalic.   Mouth/Throat: Oropharynx is clear and moist.   Eyes: EOM are normal.   Cardiovascular:  Normal rate.            Pulmonary/Chest: Effort normal.   Abdominal: Soft.   Neurological: She is alert and oriented to person, place, and time. No cranial nerve deficit.   Psychiatric: She has a normal mood and affect.             Right Hand/Wrist Exam     Inspection   Scars: Wrist - absent Hand -  absent  Effusion: Wrist - absent Hand -  absent    Pain   Wrist - The patient exhibits pain of the flexor/pronator group.    Tests   Phalens sign: positive  Tinel's sign (median nerve): positive  Carpal Tunnel Compression Test: positive    Atrophy   Thenar:  negative  Intrinsic:  negative    Other     Neuorologic Exam    Median Distribution: abnormal  Ulnar Distribution: normal  Radial Distribution: normal    Comments:  The patient has a positive Tinel and positive Phalen sign.  There is no thenar atrophy noted.      Left Hand/Wrist Exam     Inspection   Scars: Wrist - absent Hand -  absent  Effusion: Wrist - absent Hand -  absent    Pain   Wrist - The patient exhibits pain of the flexor/pronator group.    Tests   Phalens sign: positive  Tinel's sign (median nerve): positive  Carpal Tunnel Compression Test: positive    Atrophy  Thenar:  Negative  Intrinsic: negative    Other     Sensory Exam  Median Distribution: abnormal  Ulnar Distribution: normal  Radial  Distribution: normal    Comments:  The patient has a positive Tinel and positive Phalen sign.  There is no thenar atrophy noted.          Vascular Exam       Capillary Refill  Right Hand: normal capillary refill  Left Hand: normal capillary refill      radiographs were not obtained today  Assessment:     Encounter Diagnosis   Name Primary?    Bilateral carpal tunnel syndrome Yes        Plan:     The patient was counseled regarding bilateral carpal tunnel release.  She wishes to do both at once.  Risk complications and alternatives were discussed including the risk of infection, anesthetic risk, to nerves and vessels, loss of motion, and possible need for additional surgeries were discussed.  She seems to understand and agree to that surgery.  All questions were answered.                Disclaimer: This note was prepared using a voice recognition system and is likely to have sound alike errors within the text.

## 2023-11-28 ENCOUNTER — TELEPHONE (OUTPATIENT)
Dept: RHEUMATOLOGY | Facility: CLINIC | Age: 53
End: 2023-11-28
Payer: MEDICAID

## 2023-11-28 ENCOUNTER — TELEPHONE (OUTPATIENT)
Dept: PAIN MEDICINE | Facility: CLINIC | Age: 53
End: 2023-11-28
Payer: MEDICAID

## 2023-11-28 NOTE — TELEPHONE ENCOUNTER
----- Message from Milagros Bonilla sent at 11/28/2023  9:28 AM CST -----  Contact: pt  Pt is calling in regard to her having pain and not able to get out of bed and need something for pain called in.  Please call her back at [phone]      [facility]  [address]  [facility] [address]  Phone: [phone] Fax: [phone]

## 2023-11-28 NOTE — TELEPHONE ENCOUNTER
----- Message from Milagros Kwonyashmartha sent at 11/28/2023  9:31 AM CST -----  Contact: pt  Pt is calling in regard to her having pain and not able to get out of bed and need something for pain called in.  Please call her back at [phone]

## 2023-11-28 NOTE — TELEPHONE ENCOUNTER
Spoke to patient.  She has been in pain 2 weeks.  She has tried Tylenol, ice and heat.  She can not get out of bed.  She is requesting something for pain.  She uses the [facility].

## 2023-11-28 NOTE — TELEPHONE ENCOUNTER
Returned pt call. Pt states that she is having severe neck and back pain. Informed pt that I can get her scheduled for a virtual visit on 12/1 and placed on the wait list for a sooner appointment. Pt verbalized understanding. All questions answered, appointment scheduled.

## 2023-11-29 ENCOUNTER — LAB VISIT (OUTPATIENT)
Dept: LAB | Facility: HOSPITAL | Age: 53
End: 2023-11-29
Attending: FAMILY MEDICINE
Payer: MEDICAID

## 2023-11-29 DIAGNOSIS — Z79.4 TYPE 2 DIABETES MELLITUS WITH HYPERGLYCEMIA, WITH LONG-TERM CURRENT USE OF INSULIN: ICD-10-CM

## 2023-11-29 DIAGNOSIS — E11.65 TYPE 2 DIABETES MELLITUS WITH HYPERGLYCEMIA, WITH LONG-TERM CURRENT USE OF INSULIN: ICD-10-CM

## 2023-11-29 DIAGNOSIS — M79.7 FIBROMYALGIA: ICD-10-CM

## 2023-11-29 LAB
CRP SERPL-MCNC: 4.8 MG/L (ref 0–8.2)
ERYTHROCYTE [SEDIMENTATION RATE] IN BLOOD BY PHOTOMETRIC METHOD: 26 MM/HR (ref 0–36)
ESTIMATED AVG GLUCOSE: 140 MG/DL (ref 68–131)
HBA1C MFR BLD: 6.5 % (ref 4–5.6)

## 2023-11-29 PROCEDURE — 36415 COLL VENOUS BLD VENIPUNCTURE: CPT | Performed by: PHYSICIAN ASSISTANT

## 2023-11-29 PROCEDURE — 85652 RBC SED RATE AUTOMATED: CPT | Performed by: PHYSICIAN ASSISTANT

## 2023-11-29 PROCEDURE — 86140 C-REACTIVE PROTEIN: CPT | Performed by: PHYSICIAN ASSISTANT

## 2023-11-29 PROCEDURE — 83036 HEMOGLOBIN GLYCOSYLATED A1C: CPT | Performed by: NURSE PRACTITIONER

## 2023-11-30 ENCOUNTER — TELEPHONE (OUTPATIENT)
Dept: RHEUMATOLOGY | Facility: CLINIC | Age: 53
End: 2023-11-30
Payer: MEDICAID

## 2023-11-30 NOTE — TELEPHONE ENCOUNTER
----- Message from Mohini Wyman PA-C sent at 11/30/2023 12:46 PM CST -----  Please call her to let her know inflammatory markers are in normal range.  Please add her to the waitlist to move her appt w me up.  Also, I'd have her establish care w an MD for first available non commercial slot (will need to have someone add it for you) and add that appt to the waitlist as well     I also recommend follow up in the pain clinic.  Looks like she is due to see them since having had the neck injections back in Jan.  She will need to contact them directly to schedule.

## 2023-12-01 ENCOUNTER — TELEPHONE (OUTPATIENT)
Dept: PAIN MEDICINE | Facility: CLINIC | Age: 53
End: 2023-12-01
Payer: MEDICAID

## 2023-12-01 ENCOUNTER — OFFICE VISIT (OUTPATIENT)
Dept: PAIN MEDICINE | Facility: CLINIC | Age: 53
End: 2023-12-01
Payer: MEDICAID

## 2023-12-01 ENCOUNTER — PATIENT MESSAGE (OUTPATIENT)
Dept: PAIN MEDICINE | Facility: CLINIC | Age: 53
End: 2023-12-01

## 2023-12-01 DIAGNOSIS — M47.812 CERVICAL SPONDYLOSIS: Primary | ICD-10-CM

## 2023-12-01 PROCEDURE — 3044F HG A1C LEVEL LT 7.0%: CPT | Mod: CPTII,95,, | Performed by: PHYSICAL MEDICINE & REHABILITATION

## 2023-12-01 PROCEDURE — 3072F PR LOW RISK FOR RETINOPATHY: ICD-10-PCS | Mod: CPTII,95,, | Performed by: PHYSICAL MEDICINE & REHABILITATION

## 2023-12-01 PROCEDURE — 99214 PR OFFICE/OUTPT VISIT, EST, LEVL IV, 30-39 MIN: ICD-10-PCS | Mod: 95,,, | Performed by: PHYSICAL MEDICINE & REHABILITATION

## 2023-12-01 PROCEDURE — 4010F ACE/ARB THERAPY RXD/TAKEN: CPT | Mod: CPTII,95,, | Performed by: PHYSICAL MEDICINE & REHABILITATION

## 2023-12-01 PROCEDURE — 4010F PR ACE/ARB THEARPY RXD/TAKEN: ICD-10-PCS | Mod: CPTII,95,, | Performed by: PHYSICAL MEDICINE & REHABILITATION

## 2023-12-01 PROCEDURE — 3072F LOW RISK FOR RETINOPATHY: CPT | Mod: CPTII,95,, | Performed by: PHYSICAL MEDICINE & REHABILITATION

## 2023-12-01 PROCEDURE — 99214 OFFICE O/P EST MOD 30 MIN: CPT | Mod: 95,,, | Performed by: PHYSICAL MEDICINE & REHABILITATION

## 2023-12-01 PROCEDURE — 3044F PR MOST RECENT HEMOGLOBIN A1C LEVEL <7.0%: ICD-10-PCS | Mod: CPTII,95,, | Performed by: PHYSICAL MEDICINE & REHABILITATION

## 2023-12-01 RX ORDER — TRAMADOL HYDROCHLORIDE 50 MG/1
50 TABLET ORAL EVERY 8 HOURS PRN
Qty: 28 TABLET | Refills: 0 | Status: SHIPPED | OUTPATIENT
Start: 2023-12-01 | End: 2024-03-08

## 2023-12-01 NOTE — PROGRESS NOTES
Chronic Pain-Tele-Medicine-Established Note (Follow up visit)    The patient location is:  [place]  The chief complaint leading to consultation is:  Neck pain  Visit type: Virtual visit with synchronous audio and video  Total time spent with patient:  10-15 minutes  Each patient to whom he or she provides medical services by telemedicine is: (1) informed of the relationship between the physician and patient and the respective role of any other health care provider with respect to management of the patient; and (2) notified that he or she may decline to receive medical services by telemedicine and may withdraw from such care at any time.    Notes:    SUBJECTIVE:    Yolanda Betts presents to tele-medicine appointment for a follow-up appointment for neck pain. Since the last visit, Yolanda Betts states the pain has been worsening. Current pain intensity is 10/10.  She locates the majority of pain to the cervical myofascial region with radiation into her shoulder blade area.  She denies any radiation into the upper extremities.  Previous cervical ROCAEL provided 50% relief for only about 3 weeks.    Interval History (1/17/2023):  Yolanda Betts presents today for follow-up visit.  Patient was last seen on 05/09/2022. At that visit, the plan was to discontinue Savella and Flexeril, continue Cymbalta 60 mg once daily, and begin Tizanidine. She reports she had a slip and fall on Sunday in her tub. Denies any dizziness or LOC before fall, just slipped. Able to get up. Sore all over since then. Patient reports pain as 10/10 today. MRI of cervical spine ordered by Dr. Kirkpatrick, who thinks shoulder pain is stemming more from neck. No injections in shoulder. Pain begins in neck and radiates into both arms and down spine. Reports nothing helps her pain. Requesting referral for medicinal marijuana as she heard that this may be beneficial.        Interval HPI 05/09/2022  oYlanda Betts is a 52 y.o. female  presents today for tele medicine follow-up.  She was recently seen on 03/07/2022.  At that visit, she was provided with a Medrol Dosepak and advised to continue with topical analgesics along with Savella, Cymbalta, and Flexeril p.r.n..  She reports less than 10% relief with this current medication regimen and is having poor sleep.     Interval History (3/7/2022):    Yolanda Betts presents today for follow-up visit.  Patient was last seen about 6 months ago.  She doesn't feel pain is controlled currently, but she does not feel like she can go up on the Savella because she is concerned about drowsiness.  She continues to take the Flexeril twice a day.  She alternates with Voltaren gel and lidocaine cream, which helps somewhat.  She continues to follow-up with orthopedics and had a right shoulder injection on 02/07/2022 with some pain relief.  Patient reports pain as 10/10 today.     Interval History (9/17/2021):  Yolanda Betts presents today on telemedicine visit.  Patient was last seen on 03/23/2021 with Dr. Hay. At that visit, she was on Cymbalta 60 mg twice a day, which she is now only taking once a day.  She is also taking Savella, and she inquires about an increase.  Patient reports pain as 8/10 today.     History of Present Illness:   This patient is a 50 y.o. female who presents today complaining of the above noted pain/s. The patient describes the pain as follows.  Ms. Betts his new patient clinic with complaints of bilateral shoulders, bilateral hips, thoracic and lumbar spine pain.  She has a history of fibromyalgia reports that this pain feels different from the fibromyalgia.  She has been diagnosed with fibromyalgiafor approximately 10 years has been having these joint complaints for approximately last 5 years.  She describes a sharp throbbing sensation which is worse with movement and she has been unable find anything that provides pain relief.  She has tried numerous different medications  including Aleve, Tylenol, Robaxin, tizanidine, Advil, gabapentin, Flexeril, Lyrica, Cymbalta, baclofen in addition to currently participating in physical therapy.  She has had shoulder joint injections in the past which helped for short period of time but denies having had hip injections.  She denies having had surgery on any of these joints.  She reports that anti-inflammatory medications cause her blood pressure to increase therefore she has to avoid them.     Yolanda Betts is a 52 y.o. female  who is presenting with a chief complaint of lumbar back pain . The patient began experiencing this problem insidiously, and the pain has been gradually worsening over the past 5 year(s). The pain is described as throbbing, cramping, aching and heavy and is located in the bilateral lumbar spine . Pain is intermittent and lasts hours. The  pain is nonradiating. The patient rates her pain a 7 out of ten and interferes with activities of daily living a 6 out of ten. Pain is exacerbated by flexion/ extension of the lumbar spine, and is improved by rest. Patient reports prior trauma, no prior spinal surgery      - pertinent negatives: No fever, No chills, No weight loss, No bladder dysfunction, No bowel dysfunction, No saddle anesthesia  - pertinent positives: none    - medications, other therapies tried (physical therapy, injections):     >> NSAIDs, Tylenol, Tramadol, gabapentin, lyrica, zanaflex and flexeril    >> Has previously undergone Physical Therapy      And procedures:   -C6-7 IL ROCAEL on 01/31/2023, 50% relief for 3 weeks        Imaging / Labs / Studies (reviewed on 12/04/2023):     MRI cervical spine 01/03/2023  FINDINGS:  The vertebral body heights and alignment are maintained throughout the cervical spine.     No abnormal vertebral body marrow signal.     Multilevel intervertebral disc desiccation.     No abnormal signal at the cervicomedullary junction or cervical spinal cord.     C2-3: Normal intervertebral  disc contour. No central canal or neural foraminal narrowing.     C3-4: Normal intervertebral disc contour.  Bilateral uncovertebral and facet hypertrophy.  No central canal or neural foraminal narrowing.     C4-5: Intervertebral disc bulge.  Bilateral uncovertebral and facet hypertrophy.  No central canal narrowing.  Mild bilateral neural foraminal narrowing.     C5-6: Intervertebral disc bulge.  Bilateral uncovertebral and facet hypertrophy.  Minimal bilateral neural foraminal narrowing.  No central canal narrowing.     C6-7: Intervertebral disc bulge.  No central canal or neural foraminal narrowing.     C7-T1: Normal intervertebral disc contour. No central canal or neural foraminal narrowing.     The paravertebral soft tissues are unremarkable.     Impression:     At C4-5, there is mild intervertebral disc bulge with uncovertebral and facet hypertrophy mildly narrowing the bilateral neural foramina.     At C5-6, there is an intervertebral disc bulge with uncovertebral and facet hypertrophy minimally narrowing the bilateral neural foramina.      11/13/2020 X-Ray Lumbar Spine Ap And Lateral  COMPARISON:  Lumbar spine radiographs August 18, 2020     FINDINGS:  Alignment of the lumbar spine is normal without listhesis.  No fracture.  No definite pars defect.  No suspicious osseous lesion.  Intervertebral disc spaces of the lumbar spine are maintained.  Inferior lumbar spine facet arthropathy is noted.  Sacroiliac joints are unremarkable.  Multiple phleboliths are present within the pelvis.        1/14/21 X-Ray Cervical Spine Complete 5 view  COMPARISON:  August 18, 2020     FINDINGS:  Visualization of C7-T1 level on the lateral view.  Vertebral height and alignment maintained with straightening of the normal curvature.  This can be seen with positioning as well as spasm and/or strain.  Prevertebral soft tissues, C1-2 articulation and odontoid normal in appearance allowing for positioning.  Pre dens space normal.   "Neural foramina at widely patent at all levels bilaterally.     Remaining included osseous structures intact.  Visualized upper lung fields clear.     Impression  Straightening of normal C-spine curvature.  This can be seen with positioning as well as spasm and/or strain.     No acute fracture or subluxation.  Follow-up and or further evaluation as warranted.       PMHx,PSHx, Social history, and Family history:  I have reviewed the patient's medical, surgical, social, and family history in detail and updated the computerized patient record.    Review of patient's allergies indicates:   Allergen Reactions    Codeine Itching    Hydromorphone Other (See Comments)     Can't wake up for long time if taken  Slow to wake up after surgery after receiving    Aleve [naproxen sodium]     Lyrica [pregabalin] Itching    Motrin [ibuprofen]     Neuromuscular blockers, steroidal Hives     some    Latex, natural rubber Rash    Morphine Rash     itching    Norco [hydrocodone-acetaminophen] Itching, Rash and Hallucinations    Seconal [secobarbital sodium] Rash     itching    Tylox [oxycodone-acetaminophen] Rash       Current Outpatient Medications   Medication Sig    ALPRAZolam (XANAX) 1 MG tablet Take 1 tablet (1 mg total) by mouth 2 (two) times daily as needed for Anxiety.    amlodipine-valsartan (EXFORGE)  mg per tablet Take 1 tablet by mouth once daily.    aspirin (ECOTRIN) 81 MG EC tablet Take 1 tablet by mouth daily    atorvastatin (LIPITOR) 20 MG tablet Take 1 tablet (20 mg total) by mouth every evening.    BD INSULIN SYRINGE ULTRA-FINE 1/2 mL 30 gauge x 1/2" Syrg     blood sugar diagnostic (ONETOUCH ULTRA TEST) Strp 1 each by Misc.(Non-Drug; Combo Route) route 4 (four) times daily. [facility] Glucose Test Strips    blood sugar diagnostic (ONETOUCH ULTRA TEST) Strp Check blood glucose 4 times daily as directed and as needed (dispense insurance preferred brand or patient choice)    cycloSPORINE 0.05 % Drop Place 1 drop into " both eyes 2 (two) times daily.    DULoxetine (CYMBALTA) 60 MG capsule Take 1 capsule (60 mg total) by mouth 2 (two) times daily.    ergocalciferol (VITAMIN D2) 50,000 unit Cap Take 1 capsule twice weekly for 3 weeks then weekly    estradioL (ESTRACE) 0.01 % (0.1 mg/gram) vaginal cream Place 1 g vaginally twice a week.    fenofibrate (TRICOR) 145 MG tablet Take 1 tablet (145 mg total) by mouth once daily.    flash glucose scanning reader (FREEAscenzYLE AMY 2 READER) Misc Use as directed to check blood sugar    fluticasone propionate (FLONASE) 50 mcg/actuation nasal spray 2 sprays (100 mcg total) by Each Nare route once daily.    frovatriptan (FROVA) 2.5 MG tablet Take 1 tablet at onset of acute migraine. May take 1 more tablet 2 hours later if needed. (MAX 2 TABLET PER DAY. MAX 4 TABLETS PER WEEK.)    furosemide (LASIX) 20 MG tablet Take 1 tablet (20 mg total) by mouth once daily.    glucagon (BAQSIMI) 3 mg/actuation Spry 1 spray by Nasal route as needed (hypoglycemia). For emergency use. May repeat 1 time after 15 minutes    insulin aspart U-100 (NOVOLOG FLEXPEN U-100 INSULIN) 100 unit/mL (3 mL) InPn pen Inject 10 Units into the skin 3 (three) times daily before meals. Use per sliding scale up to 10 units 3 times a day before meals    lamoTRIgine (LAMICTAL) 150 MG Tab Take 1 tablet (150 mg total) by mouth every morning.    lancets (ONETOUCH DELICA PLUS LANCET) 30 gauge Misc Use as directed 3 times daily    levocetirizine (XYZAL) 5 MG tablet Take 1 tablet (5 mg total) by mouth every evening.    linaCLOtide (LINZESS) 290 mcg Cap capsule Take 1 capsule (290 mcg total) by mouth before breakfast.    lurasidone (LATUDA) 80 mg Tab tablet Take 1 tablet (80 mg total) by mouth once daily.    magnesium oxide (MAG-OX) 400 mg (241.3 mg magnesium) tablet Take 1 tablet (400 mg total) by mouth once daily.    metFORMIN (GLUCOPHAGE) 1000 MG tablet Take 1 tablet (1,000 mg total) by mouth 2 (two) times daily with meals.    metoprolol  "succinate (TOPROL-XL) 50 MG 24 hr tablet Take 1 tablet (50 mg total) by mouth every evening.    mirtazapine (REMERON) 15 MG tablet Take 1 tablet by mouth at bedtime.    montelukast (SINGULAIR) 10 mg tablet Take 10 mg by mouth.    omeprazole (PRILOSEC) 40 MG capsule Take 1 capsule (40 mg total) by mouth once daily.    pen needle, diabetic (BD ULTRA-FINE MINI PEN NEEDLE) 31 gauge x 3/16" Ndle Use one needle 5 times a day    pen needle, diabetic (BD ULTRA-FINE MINI PEN NEEDLE) 31 gauge x 3/16" Ndle Use 5 a day    pneumoc 20-tatiana conj-dip cr,PF, (PREVNAR-20, PF,) 0.5 mL Syrg injection Inject 0.5 mLs into the muscle.    predniSONE (DELTASONE) 10 MG tablet Take 1 tablet (10 mg total) by mouth once daily.    promethazine (PHENERGAN) 12.5 MG Tab     prucalopride (MOTEGRITY) 2 mg Tab Take 1 tablet (2 mg) by mouth once daily.    secukinumab (COSENTYX PEN, 2 PENS,) 150 mg/mL PnIj Inject 300 mg (2 mL) into the skin every 28 days.    semaglutide (OZEMPIC) 2 mg/dose (8 mg/3 mL) PnIj Inject 2 mg into the skin every 7 days.    spironolactone (ALDACTONE) 25 MG tablet Take 1 tablet (25 mg total) by mouth once daily.    tiZANidine (ZANAFLEX) 4 MG tablet Take 2 tablets by mouth every 6 hours as needed    traMADoL (ULTRAM) 50 mg tablet Take 1 tablet (50 mg total) by mouth every 8 (eight) hours as needed for Pain.     No current facility-administered medications for this visit.       REVIEW OF SYSTEMS:    GENERAL:  No weight loss, malaise or fevers.  HEENT:   No recent changes in vision or hearing  NECK:  Negative for lumps, no difficulty with swallowing.  RESPIRATORY:  Negative for cough, wheezing or shortness of breath, patient denies any recent URI.  CARDIOVASCULAR:  Negative for chest pain, leg swelling or palpitations.  GI:  Negative for abdominal discomfort, blood in stools or black stools or change in bowel habits.  MUSCULOSKELETAL:  See HPI.  SKIN:  Negative for lesions, rash, and itching.  PSYCH:  No mood disorder or recent " psychosocial stressors.  Patients sleep is not disturbed secondary to pain.  HEMATOLOGY/LYMPHOLOGY:  Negative for prolonged bleeding, bruising easily or swollen nodes.  Patient is not currently taking any anti-coagulants  NEURO:   No history of headaches, syncope, paralysis, seizures or tremors.  All other reviewed and negative other than HPI.    OBJECTIVE:  Physical exam:  GENERAL: Well appearing, in no acute distress, alert and oriented x3.    PSYCH:  Mood and affect appropriate.  SKIN: Skin color, texture, turgor normal, no rashes or lesions.  HEAD/FACE:  Normocephalic, atraumatic. Cranial nerves grossly intact.  CV:  No peripheral edema noted  PULM:  No difficulty breathing          LABS:  Lab Results   Component Value Date    WBC 8.03 10/09/2023    HGB 10.9 (L) 10/09/2023    HCT 35.4 (L) 10/09/2023    MCV 87 10/09/2023     (H) 10/09/2023       CMP  Sodium   Date Value Ref Range Status   10/09/2023 141 136 - 145 mmol/L Final     Potassium   Date Value Ref Range Status   10/09/2023 4.5 3.5 - 5.1 mmol/L Final     Chloride   Date Value Ref Range Status   10/09/2023 107 95 - 110 mmol/L Final     CO2   Date Value Ref Range Status   10/09/2023 25 23 - 29 mmol/L Final     Glucose   Date Value Ref Range Status   10/09/2023 122 (H) 70 - 110 mg/dL Final     BUN   Date Value Ref Range Status   10/09/2023 12 6 - 20 mg/dL Final     Creatinine   Date Value Ref Range Status   10/09/2023 0.9 0.5 - 1.4 mg/dL Final     Calcium   Date Value Ref Range Status   10/09/2023 9.9 8.7 - 10.5 mg/dL Final     Total Protein   Date Value Ref Range Status   10/09/2023 7.3 6.0 - 8.4 g/dL Final     Albumin   Date Value Ref Range Status   10/09/2023 4.2 3.5 - 5.2 g/dL Final     Total Bilirubin   Date Value Ref Range Status   10/09/2023 0.2 0.1 - 1.0 mg/dL Final     Comment:     For infants and newborns, interpretation of results should be based  on gestational age, weight and in agreement with clinical  observations.    Premature Infant  recommended reference ranges:  Up to 24 hours.............<8.0 mg/dL  Up to 48 hours............<12.0 mg/dL  3-5 days..................<15.0 mg/dL  6-29 days.................<15.0 mg/dL       Alkaline Phosphatase   Date Value Ref Range Status   10/09/2023 51 (L) 55 - 135 U/L Final     AST   Date Value Ref Range Status   10/09/2023 16 10 - 40 U/L Final     ALT   Date Value Ref Range Status   10/09/2023 15 10 - 44 U/L Final     Anion Gap   Date Value Ref Range Status   10/09/2023 9 8 - 16 mmol/L Final     eGFR if    Date Value Ref Range Status   03/08/2022 >60.0 >60 mL/min/1.73 m^2 Final     eGFR if non    Date Value Ref Range Status   03/08/2022 >60.0 >60 mL/min/1.73 m^2 Final     Comment:     Calculation used to obtain the estimated glomerular filtration  rate (eGFR) is the CKD-EPI equation.          Lab Results   Component Value Date    HGBA1C 6.5 (H) 11/29/2023             ASSESSMENT: 53 y.o. year old female with neck pain, consistent with     1. Cervical spondylosis  Case Request-RAD/Other Procedure Area: Bilateral C5-7 MBB (diagnostic)            PLAN:   - Interventional:    Scheduled for C5-7 MBB for diagnostic purposes to target predominantly axial based pain.  S/p C6/7 IL ROCAEL on 01/31/2023, 50% relief for 3 weeks   - Pharmacologic:    -provide short course of tramadol 50 mg Q 8 p.r.n., 21 tablets, 0 refills.  Reviewed   -Continue  Cymbalta 60 mg once daily  -continue tizanidine 4-8 mg every 8 hours  - continue diclofenac 1% gel to apply 2g topically up to 4 times per day PRN.   - continue lidocaine 2.5%-prilocaine 2.5% topical cream Q6H PRN topically; can alternate with Voltaren gel, which helps some.  - Patient has failed Gabapentin, Lyrica.  No NSAIDs due to reports of worsening hypertension.  -Referral previously sent to Dr. Pelon Drew for Medicinal Marijuana       - Rehabilitative: Encouraged regular exercise. Continue exercises and activities as tolerated.      -  Diagnostic: Cervical, Thoracic, Lumbar Xray previously reviewed.      - Follow up:  Contact patient following diagnostic blocks to determine future plan of care     - This condition does not require this patient to take time off of work, and the primary goal of our Pain Management services is to improve the patient's functional capacity.      - I discussed the risks, benefits, and alternatives to potential treatment options. All questions and concerns were fully addressed today in clinic.     The above plan and management options were discussed at length with patient. Patient is in agreement with the above and verbalized understanding.      Otto Phillips MD  Interventional Pain Management  [facility]    Disclaimer:  This note was prepared using voice recognition system and is likely to have sound alike errors that may have been overlooked even after proof reading.  Please call me with any questions

## 2023-12-01 NOTE — TELEPHONE ENCOUNTER
Called pt to schedule procedure. Procedure scheduled for 12/19. Procedure instructions reviewed and sent via [employer]. Pt verbalized understanding. All questions answered.

## 2023-12-06 RX ORDER — CEFAZOLIN SODIUM 2 G/50ML
2 SOLUTION INTRAVENOUS
Status: CANCELLED | OUTPATIENT
Start: 2023-12-06

## 2023-12-06 RX ORDER — CHLORHEXIDINE GLUCONATE ORAL RINSE 1.2 MG/ML
10 SOLUTION DENTAL
Status: CANCELLED | OUTPATIENT
Start: 2023-12-06

## 2023-12-07 ENCOUNTER — OFFICE VISIT (OUTPATIENT)
Dept: DIABETES | Facility: CLINIC | Age: 53
End: 2023-12-07
Payer: MEDICAID

## 2023-12-07 DIAGNOSIS — E11.65 TYPE 2 DIABETES MELLITUS WITH HYPERGLYCEMIA, WITH LONG-TERM CURRENT USE OF INSULIN: Primary | ICD-10-CM

## 2023-12-07 DIAGNOSIS — Z79.4 TYPE 2 DIABETES MELLITUS WITH HYPERGLYCEMIA, WITH LONG-TERM CURRENT USE OF INSULIN: Primary | ICD-10-CM

## 2023-12-07 PROCEDURE — 3072F PR LOW RISK FOR RETINOPATHY: ICD-10-PCS | Mod: CPTII,95,, | Performed by: NURSE PRACTITIONER

## 2023-12-07 PROCEDURE — 4010F ACE/ARB THERAPY RXD/TAKEN: CPT | Mod: CPTII,95,, | Performed by: NURSE PRACTITIONER

## 2023-12-07 PROCEDURE — 3044F PR MOST RECENT HEMOGLOBIN A1C LEVEL <7.0%: ICD-10-PCS | Mod: CPTII,95,, | Performed by: NURSE PRACTITIONER

## 2023-12-07 PROCEDURE — 95251 CONT GLUC MNTR ANALYSIS I&R: CPT | Mod: S$PBB,NDTC,, | Performed by: NURSE PRACTITIONER

## 2023-12-07 PROCEDURE — 1160F RVW MEDS BY RX/DR IN RCRD: CPT | Mod: CPTII,95,, | Performed by: NURSE PRACTITIONER

## 2023-12-07 PROCEDURE — 4010F PR ACE/ARB THEARPY RXD/TAKEN: ICD-10-PCS | Mod: CPTII,95,, | Performed by: NURSE PRACTITIONER

## 2023-12-07 PROCEDURE — 3072F LOW RISK FOR RETINOPATHY: CPT | Mod: CPTII,95,, | Performed by: NURSE PRACTITIONER

## 2023-12-07 PROCEDURE — 1159F MED LIST DOCD IN RCRD: CPT | Mod: CPTII,95,, | Performed by: NURSE PRACTITIONER

## 2023-12-07 PROCEDURE — 99214 OFFICE O/P EST MOD 30 MIN: CPT | Mod: 95,,, | Performed by: NURSE PRACTITIONER

## 2023-12-07 PROCEDURE — 1159F PR MEDICATION LIST DOCUMENTED IN MEDICAL RECORD: ICD-10-PCS | Mod: CPTII,95,, | Performed by: NURSE PRACTITIONER

## 2023-12-07 PROCEDURE — 95251 PR GLUCOSE MONITOR, 72 HOUR, PHYS INTERP: ICD-10-PCS | Mod: S$PBB,NDTC,, | Performed by: NURSE PRACTITIONER

## 2023-12-07 PROCEDURE — 1160F PR REVIEW ALL MEDS BY PRESCRIBER/CLIN PHARMACIST DOCUMENTED: ICD-10-PCS | Mod: CPTII,95,, | Performed by: NURSE PRACTITIONER

## 2023-12-07 PROCEDURE — 99214 PR OFFICE/OUTPT VISIT, EST, LEVL IV, 30-39 MIN: ICD-10-PCS | Mod: 95,,, | Performed by: NURSE PRACTITIONER

## 2023-12-07 PROCEDURE — 3044F HG A1C LEVEL LT 7.0%: CPT | Mod: CPTII,95,, | Performed by: NURSE PRACTITIONER

## 2023-12-07 RX ORDER — TIRZEPATIDE 5 MG/.5ML
5 INJECTION, SOLUTION SUBCUTANEOUS
Qty: 4 PEN | Refills: 2 | Status: SHIPPED | OUTPATIENT
Start: 2023-12-07 | End: 2024-01-10

## 2023-12-07 RX ORDER — TIZANIDINE 4 MG/1
TABLET ORAL
Qty: 240 TABLET | Refills: 0 | Status: CANCELLED | OUTPATIENT
Start: 2023-12-07

## 2023-12-07 NOTE — TELEPHONE ENCOUNTER
No care due was identified.  [facility] Embedded Care Due Messages. Reference number: [id number].   12/07/2023 8:27:02 AM CST

## 2023-12-07 NOTE — PROGRESS NOTES
Subjective:         Patient ID: Yolanda Betts is a 53 y.o. female.  Patient's current PCP is Sasha Sorenson MD.       Chief Complaint: No chief complaint on file.      HPI  Yolanda Betts is a 53 y.o. Black or  female presenting for a follow up for diabetes. Patient has been diagnosed with diabetes for > 10 years.     Complications related to diabetes:  HTN, Hyperlipidemia, peripheral neuropathy; denies Pancreatitis; denies Gastroparesis; denies DKA; denies Hx/family Hx of MEN2/MTC; reports Frequent UTIs/yeast infections; Bariatric surgery 6/1/21.     Past failed treatment include:  Jardiance- multiple yeast infections; Victoza - GI upset    Blood glucose testing is performed regularly with her Dexcom; Interpretation of CGMS as follows: Average Glucose: 129 mg/dl; 1 % Very High; 7% High; 92 % In Range; 1% Low;  0% Very Low - overall stable    Compliance:The patient reports medication and diet compliance most of the time.       The patient location is: [place], [place]  The chief complaint leading to consultation is: DM follow up    Visit type: audiovisual    Face to Face time with patient: 30 minutes of total time spent on the encounter, which includes face to face time and non-face to face time preparing to see the patient (eg, review of tests), Obtaining and/or reviewing separately obtained history, Documenting clinical information in the electronic or other health record, Independently interpreting results (not separately reported) and communicating results to the patient/family/caregiver, or Care coordination (not separately reported). Each patient to whom he or she provides medical services by telemedicine is:  (1) informed of the relationship between the physician and patient and the respective role of any other health care provider with respect to management of the patient; and (2) notified that he or she may decline to receive medical services by telemedicine and may withdraw from such  care at any time.         //   , There is no height or weight on file to calculate BMI.  Her blood sugar in clinic today is:   Lab Results   Component Value Date    POCGLU 78 03/10/2023       Labs reviewed and are noted below.  Her most recent A1C is:  Lab Results   Component Value Date    HGBA1C 6.5 (H) 11/29/2023    HGBA1C 5.6 08/08/2023    HGBA1C 6.3 (H) 01/13/2023     Lab Results   Component Value Date    CPEPTIDE 4.56 02/26/2018     Lab Results   Component Value Date    GLUTAMICACID 0.00 02/26/2018     Glucose   Date Value Ref Range Status   10/09/2023 122 (H) 70 - 110 mg/dL Final     Anion Gap   Date Value Ref Range Status   10/09/2023 9 8 - 16 mmol/L Final     eGFR if    Date Value Ref Range Status   03/08/2022 >60.0 >60 mL/min/1.73 m^2 Final     eGFR if non    Date Value Ref Range Status   03/08/2022 >60.0 >60 mL/min/1.73 m^2 Final     Comment:     Calculation used to obtain the estimated glomerular filtration  rate (eGFR) is the CKD-EPI equation.          CURRENT DM MEDICATIONS:     Diabetes Medications               glucagon (BAQSIMI) 3 mg/actuation Spry 1 spray by Nasal route as needed (hypoglycemia). For emergency use. May repeat 1 time after 15 minutes    insulin aspart U-100 (NOVOLOG FLEXPEN U-100 INSULIN) 100 unit/mL (3 mL) InPn pen Inject 10 Units into the skin 3 (three) times daily before meals. Use per sliding scale up to 10 units 3 times a day before meals    metFORMIN (GLUCOPHAGE) 1000 MG tablet Take 1 tablet (1,000 mg total) by mouth 2 (two) times daily with meals.    semaglutide (OZEMPIC) 2 mg/dose (8 mg/3 mL) PnIj Inject 2 mg into the skin every 7 days.               Diabetes Management Status    Statin: Taking  ACE/ARB: Taking    Screening or Prevention Patient's value Goal Complete/Controlled?   HgA1C Testing and Control   Lab Results   Component Value Date    HGBA1C 6.5 (H) 11/29/2023      Annually/Less than 8% Yes   Lipid profile : 08/08/2023 Annually Yes    LDL control Lab Results   Component Value Date    LDLCALC 43.4 (L) 08/08/2023    Annually/Less than 100 mg/dl  Yes   Nephropathy screening Lab Results   Component Value Date    LABMICR 5.0 09/21/2022     Lab Results   Component Value Date    PROTEINUA Negative 07/20/2020    Annually Yes   Blood pressure BP Readings from Last 1 Encounters:   10/09/23 125/74    Less than 140/90 Yes   Dilated retinal exam : 12/19/2022 Annually Yes   Foot exam   : 02/16/2023 Annually Yes     Review of Systems   Constitutional:  Negative for activity change and appetite change.   Gastrointestinal:  Positive for constipation. Negative for abdominal pain, diarrhea, nausea and vomiting.        Hx of IBS, GERD   Endocrine: Negative for polydipsia, polyphagia and polyuria.   Musculoskeletal:  Positive for arthralgias.   Neurological:  Negative for syncope and weakness.        Neuropathy   Psychiatric/Behavioral:  Negative for confusion.          Objective:      Physical Exam  Constitutional:       General: She is not in acute distress.     Appearance: She is not ill-appearing.   Neck:      Thyroid: Mass: neg of self exam.   Neurological:      Mental Status: She is alert.   Psychiatric:         Speech: Speech normal.         Assessment:       1. Type 2 diabetes mellitus with hyperglycemia, with long-term current use of insulin                  Plan:   Type 2 diabetes mellitus with hyperglycemia, with long-term current use of insulin  -     tirzepatide (MOUNJARO) 5 mg/0.5 mL PnIj; Inject 5 mg into the skin every 7 days.  Dispense: 4 Pen; Refill: 2  -     Hemoglobin A1C; Future; Expected date: 12/07/2023  -     Microalbumin/Creatinine Ratio, Urine               PLAN:   - Condition: good control   - Monitor blood glucose 4x daily. Goals reviewed  - She is working with the RD  - BP and LDL- Recommended lifestyle modifications for management. Encouraged healthy low fat, low carb diet and increase physical activity  - Medication Changes: Continue  Toujeo, Continue Metformin, Change Ozempic to Mounjaro 5 mg - risk/SE/administration- to help with BG spikes and weight  - Constipation is overall controlled with current regimen - she understands the risk associated with GLP-1s  - The patient was explained the above plan and given opportunity to ask questions.  She understands, chooses and consents to this plan and accepts all the risks, which include but are not limited to the risks mentioned above.   - Labs ordered as above  - Nurse visit:  deferred  - she will message me in 3-4 weeks regarding Mounjaro tolerance and dose increase  - Follow up: 2 months     If blood pre-meal sugar is  take 2 units of Novolog;  If blood pre-meal sugar is 151-200 add +2 units of Novolog;  If blood pre-meal sugar is 201-250 add +4 units of Novolog;  If blood pre-meal sugar is 251-300 add +6 units of Novolog;  If blood pre-meal sugar is 301-350 add  +8 units of Novolog;  If blood pre-meal sugar is 351-400+ add  +10 units of Novolog;      A total of 30 minutes was spent in face to face time, of which over 50% was spent in counseling patient on disease process, complications, treatment, and side effects of medications.

## 2023-12-08 DIAGNOSIS — G43.109 MIGRAINE WITH AURA AND WITHOUT STATUS MIGRAINOSUS, NOT INTRACTABLE: Chronic | ICD-10-CM

## 2023-12-08 NOTE — TELEPHONE ENCOUNTER
No care due was identified.  [facility] Embedded Care Due Messages. Reference number: [id number].   12/08/2023 12:09:26 PM CST

## 2023-12-10 RX ORDER — TIZANIDINE 4 MG/1
TABLET ORAL
Qty: 240 TABLET | Refills: 0 | OUTPATIENT
Start: 2023-12-10

## 2023-12-10 RX ORDER — FROVATRIPTAN SUCCINATE 2.5 MG/1
TABLET, FILM COATED ORAL
Qty: 9 TABLET | Refills: 1 | OUTPATIENT
Start: 2023-12-10

## 2023-12-11 ENCOUNTER — OFFICE VISIT (OUTPATIENT)
Dept: INTERNAL MEDICINE | Facility: CLINIC | Age: 53
End: 2023-12-11
Payer: MEDICAID

## 2023-12-11 VITALS
OXYGEN SATURATION: 97 % | WEIGHT: 176.69 LBS | HEIGHT: 56 IN | BODY MASS INDEX: 39.75 KG/M2 | HEART RATE: 90 BPM | TEMPERATURE: 98 F | RESPIRATION RATE: 14 BRPM | DIASTOLIC BLOOD PRESSURE: 79 MMHG | SYSTOLIC BLOOD PRESSURE: 138 MMHG

## 2023-12-11 DIAGNOSIS — F31.9 BIPOLAR 1 DISORDER: Chronic | ICD-10-CM

## 2023-12-11 DIAGNOSIS — J45.40 MODERATE PERSISTENT ASTHMA WITHOUT COMPLICATION: ICD-10-CM

## 2023-12-11 DIAGNOSIS — E11.65 TYPE 2 DIABETES MELLITUS WITH HYPERGLYCEMIA, WITH LONG-TERM CURRENT USE OF INSULIN: Chronic | ICD-10-CM

## 2023-12-11 DIAGNOSIS — K21.9 GASTROESOPHAGEAL REFLUX DISEASE, UNSPECIFIED WHETHER ESOPHAGITIS PRESENT: ICD-10-CM

## 2023-12-11 DIAGNOSIS — G56.03 BILATERAL CARPAL TUNNEL SYNDROME: ICD-10-CM

## 2023-12-11 DIAGNOSIS — Z79.4 TYPE 2 DIABETES MELLITUS WITH HYPERGLYCEMIA, WITH LONG-TERM CURRENT USE OF INSULIN: Chronic | ICD-10-CM

## 2023-12-11 DIAGNOSIS — Z01.818 PRE-OP EXAM: ICD-10-CM

## 2023-12-11 DIAGNOSIS — G47.36 NOCTURNAL HYPOXEMIA DUE TO OBESITY: Chronic | ICD-10-CM

## 2023-12-11 DIAGNOSIS — E11.69 DYSLIPIDEMIA ASSOCIATED WITH TYPE 2 DIABETES MELLITUS: Chronic | ICD-10-CM

## 2023-12-11 DIAGNOSIS — I10 ESSENTIAL HYPERTENSION: Chronic | ICD-10-CM

## 2023-12-11 DIAGNOSIS — Z01.818 PREOP EXAMINATION: Primary | ICD-10-CM

## 2023-12-11 DIAGNOSIS — E66.9 NOCTURNAL HYPOXEMIA DUE TO OBESITY: Chronic | ICD-10-CM

## 2023-12-11 DIAGNOSIS — E78.5 DYSLIPIDEMIA ASSOCIATED WITH TYPE 2 DIABETES MELLITUS: Chronic | ICD-10-CM

## 2023-12-11 DIAGNOSIS — M79.7 FIBROMYALGIA: Chronic | ICD-10-CM

## 2023-12-11 PROCEDURE — 99214 PR OFFICE/OUTPT VISIT, EST, LEVL IV, 30-39 MIN: ICD-10-PCS | Mod: S$PBB,,, | Performed by: NURSE PRACTITIONER

## 2023-12-11 PROCEDURE — 3072F PR LOW RISK FOR RETINOPATHY: ICD-10-PCS | Mod: CPTII,,, | Performed by: NURSE PRACTITIONER

## 2023-12-11 PROCEDURE — 1160F RVW MEDS BY RX/DR IN RCRD: CPT | Mod: CPTII,,, | Performed by: NURSE PRACTITIONER

## 2023-12-11 PROCEDURE — 93010 ELECTROCARDIOGRAM REPORT: CPT | Mod: ,,, | Performed by: INTERNAL MEDICINE

## 2023-12-11 PROCEDURE — 4010F ACE/ARB THERAPY RXD/TAKEN: CPT | Mod: CPTII,,, | Performed by: NURSE PRACTITIONER

## 2023-12-11 PROCEDURE — 93010 EKG 12-LEAD: ICD-10-PCS | Mod: ,,, | Performed by: INTERNAL MEDICINE

## 2023-12-11 PROCEDURE — 3075F PR MOST RECENT SYSTOLIC BLOOD PRESS GE 130-139MM HG: ICD-10-PCS | Mod: CPTII,,, | Performed by: NURSE PRACTITIONER

## 2023-12-11 PROCEDURE — 3044F PR MOST RECENT HEMOGLOBIN A1C LEVEL <7.0%: ICD-10-PCS | Mod: CPTII,,, | Performed by: NURSE PRACTITIONER

## 2023-12-11 PROCEDURE — 3075F SYST BP GE 130 - 139MM HG: CPT | Mod: CPTII,,, | Performed by: NURSE PRACTITIONER

## 2023-12-11 PROCEDURE — 1159F MED LIST DOCD IN RCRD: CPT | Mod: CPTII,,, | Performed by: NURSE PRACTITIONER

## 2023-12-11 PROCEDURE — 99214 OFFICE O/P EST MOD 30 MIN: CPT | Mod: S$PBB,,, | Performed by: NURSE PRACTITIONER

## 2023-12-11 PROCEDURE — 3008F PR BODY MASS INDEX (BMI) DOCUMENTED: ICD-10-PCS | Mod: CPTII,,, | Performed by: NURSE PRACTITIONER

## 2023-12-11 PROCEDURE — 3044F HG A1C LEVEL LT 7.0%: CPT | Mod: CPTII,,, | Performed by: NURSE PRACTITIONER

## 2023-12-11 PROCEDURE — 3078F PR MOST RECENT DIASTOLIC BLOOD PRESSURE < 80 MM HG: ICD-10-PCS | Mod: CPTII,,, | Performed by: NURSE PRACTITIONER

## 2023-12-11 PROCEDURE — 3078F DIAST BP <80 MM HG: CPT | Mod: CPTII,,, | Performed by: NURSE PRACTITIONER

## 2023-12-11 PROCEDURE — 99999 PR PBB SHADOW E&M-EST. PATIENT-LVL V: CPT | Mod: PBBFAC,,,

## 2023-12-11 PROCEDURE — 4010F PR ACE/ARB THEARPY RXD/TAKEN: ICD-10-PCS | Mod: CPTII,,, | Performed by: NURSE PRACTITIONER

## 2023-12-11 PROCEDURE — 3008F BODY MASS INDEX DOCD: CPT | Mod: CPTII,,, | Performed by: NURSE PRACTITIONER

## 2023-12-11 PROCEDURE — 1160F PR REVIEW ALL MEDS BY PRESCRIBER/CLIN PHARMACIST DOCUMENTED: ICD-10-PCS | Mod: CPTII,,, | Performed by: NURSE PRACTITIONER

## 2023-12-11 PROCEDURE — 3072F LOW RISK FOR RETINOPATHY: CPT | Mod: CPTII,,, | Performed by: NURSE PRACTITIONER

## 2023-12-11 PROCEDURE — 99215 OFFICE O/P EST HI 40 MIN: CPT | Mod: PBBFAC

## 2023-12-11 PROCEDURE — 1159F PR MEDICATION LIST DOCUMENTED IN MEDICAL RECORD: ICD-10-PCS | Mod: CPTII,,, | Performed by: NURSE PRACTITIONER

## 2023-12-11 PROCEDURE — 99999 PR PBB SHADOW E&M-EST. PATIENT-LVL V: ICD-10-PCS | Mod: PBBFAC,,,

## 2023-12-11 PROCEDURE — 93005 ELECTROCARDIOGRAM TRACING: CPT

## 2023-12-11 NOTE — ASSESSMENT & PLAN NOTE
- Recent A1c 6.5 on 11/29.  - Patient currently on Mounjaro with last dose taken today.  She was instructed to hold next Monday's dose, and resume postoperatively.  - Instructed to hold Metformin 24 hours prior to surgery and resume postoperatively as directed.  - Instructed to only take 1/2 her usual dose of insulin the night before surgery with NO insulin the morning of surgery.  - ADA diet.

## 2023-12-11 NOTE — PRE-PROCEDURE INSTRUCTIONS
To confirm, your doctor has instructed you: Surgery is scheduled for 12/18/2023.   Pre admit office will call the afternoon prior to surgery between 1PM and 3PM with arrival time.    Surgery will be at [facility],  The address is [address]. [place] [address].      IMPORTANT INSTRUCTIONS!    Do not eat or drink after 12 midnight, including water. Do not smoke or use chewing tobacco after 12 midnight!  OK to brush teeth, but no gum, candy, or mints!      *Take only these medicines with a small swallow of water-morning of surgery*     Cymbalta, lamictal, singulair, prilosec         ____ Stop taking all vitamins, herbal supplements, Aspirin, & NSAIDS (Ibuprofen, Advil, Aleve) 7 days prior to surgery, as these can thin your blood.    ____ Weight loss medication, such as Adipex and Phentermine, must be stopped 14 days prior to surgery, no exceptions!    *Diabetic Patients: If you take diabetic or weight loss medication, do NOT take morning of surgery unless instructed by Doctor. Metformin to be stopped 24 hrs prior to surgery. DO NOT take short-acting insulin the day of surgery. Only take HALF of your regular dose of long-acting insulin the night before surgery, unless instructed otherwise. Blood sugars will be checked in pre-op.   ~Ozempic/Mounjaro/Wegovy/Trulicity/Semaglutide injections must be stopped 7 days prior to surgery.     Please notify MD office if you develop an active infection, are prescribed antibiotics by someone other than the surgeon doing your surgery, or visit urgent care/ER.      Bathing Instructions:   The night before surgery and the morning prior to coming to the hospital:    - Shower & rinse your body as usual with anti-bacterial Soap (Dial or Lever 2000)   -Hibiclens (if indicated) use AFTER anti-bacterial soap; 1 packet PM/1 packet in AM on surgical site only   -Do not use hibiclens on your head, face, or genitals.    -Do not wash with anti-bacterial soap after you use  the hibiclens.    -Do not shave surgical site 5-7 days prior to surgery.    -Pubic hair 7 days prior to surgery (OBGYN/Urology only).       Additional Instructions:   __ No powder, lotions, creams, or body spray to skin   __ No deodorant if you are having: breast procedure, PORT, or upper shoulder surgery!   __ No nail polish or artificial nails       **SURGERY WILL BE CANCELLED IF ARTIFICIAL/NAIL POLISH IS PRESENT!!!**  __ Please remove all piercings and leave all jewelry at home.    **SURGERY WILL BE CANCELLED IF PIERCINGS ARE PRESENT!!!**    __ Dentures, Hearing Aids and Contact Lens need to be removed prior to the start of surgery.    __ Avoid Alcoholic beverages 3 days prior to surgery, as it can thin the blood, unless told otherwise by pre-admit department.  __ Females: may need to give a urine sample the morning of surgery;   **Please ask  for a specimen cup if you need to use the restroom prior to being called into pre-op.**  __ Males: Stop ED medications (Viagra, Cialis) 24 hrs prior to surgery.  __ Wear clean, loose-fitting clothing. Allow for dressings/bandages/surgical equipment   __ You must have transportation, and they MUST stay the entire time.   __  [facility] photo [facility] and [facility]:   Only 1 adult allowed (over the age of 18) to accompany you and MUST STAY through the entire length of admission.     -Must have a ride home from a responsible adult that you know and trust.    -Medical Transport, Uber or Lyft can only be used if patient has a responsible adult to accompany them during ride home.    ~Your ride MUST STAY the entire time until you are discharged~        Post-Op Instructions: You will receive surgery post-op instructions by your Discharge Nurse prior to going home.   Surgical Site Infection:   Prevention of surgical site infections:   -Keep incisions clean and dry.   -Do not soak/submerge incisions in water until completely  healed.   -Do not apply lotions, powders, creams, or deodorants to site.   -Always make sure hands are cleaned with antibacterial soap/ alcohol-based  prior to touching the surgical site.       Signs and symptoms:               -Redness and pain around the area where you had surgery               -Drainage of cloudy fluid from your surgical wound               -Fever over 100.4 or chills     >>>Call Surgeon office/on-call Surgeon if you experience any of these signs & symptoms post-surgery @ [phone].    *If you are running late or have questions the morning of surgery before 7AM, please call the Pre-OP Department @ [phone].    *Please Call [facility] Pre-Admit Department for surgery instruction questions:  [phone]    *Payment questions:  [phone]    *Billing questions:  [phone]

## 2023-12-11 NOTE — ASSESSMENT & PLAN NOTE
- Patient presents today at the request of Dr. Roth who plans on performing a CTR on 12/18.    Known risk factors for perioperative complications: Chronic pulmonary disease  Diabetes mellitus  HTN  Morbid Obesity  Difficulty with intubation is not anticipated.    Cardiac Risk Estimation: Based on the Revised Cardiac Risk index, patient is a Class II risk with a 6.0% risk of a major cardiac event in a low risk procedure.    1.) Preoperative workup as follows: hemoglobin, hematocrit, electrolytes, creatinine, glucose, liver function studies.  2.) Change in medication regimen before surgery: discontinue ASA 5 days before surgery, discontinue NSAIDs 5 days before surgery, hold Metformin 24 hours prior to surgery.  Patient was instructed to hold Mounjaro after today's dose and resume postoperatively.  Instructed to hold Exforge, Lasix, and Spironolactone  the morning of surgery. Instructed to only take 1/2 her usual dose of insulin the night before surgery with NO insulin the morning of surgery.  3.) Prophylaxis for cardiac events with perioperative beta-blockers: Continue Toprol XL.  4.) Invasive hemodynamic monitoring perioperatively: not indicated.  5.) Deep vein thrombosis prophylaxis postoperatively: intermittent pneumatic compression boots and regimen to be chosen by surgical team.  6.) Surveillance for postoperative MI with ECG immediately postoperatively and on postoperati ve days 1 and 2 AND troponin levels 24 hours postoperatively and on day 4 or hospital discharge (whichever comes first): not indicated.  7.) Current medications which may produce withdrawal symptoms if withheld perioperatively: None.  8.) Other measures:  None.

## 2023-12-11 NOTE — PROGRESS NOTES
Preoperative History and Physical  [facility]                                                                   Chief Complaint: Preoperative evaluation     History of Present Illness:      Yolanda Betts is a 53 y.o. female with a PMHx of HTN, HLD, IDDM, Obesity, GERD, Asthma, Bipolar, Fibromyalgia, Nocturnal hypoxemia r/t obesity without ALEN on 2L NC qhs, and Carpal tunnel syndrome who presents to the office today for a preoperative consultation at the request of Dr. Roth who plans on performing Bilateral CTR on December 18.     Functional Status:      The patient is able to climb a flight of stairs. The patient is able to ambulate without difficulty. The patient's functional status is affected by the surgical problem. The patient's functional status is not affected by shortness of breath, chest pain, dyspnea on exertion and fatigue.      MET score greater than 4    Patient Anesthesia History:      History of Malignant Hyperthermia: no  History of Pseudocholinesterase Deficiency: no  History PONV: no  History of difficult intubation: no  History of delayed emergence: yes- Only if given Dilaudid.  History of high fever after anesthesia: no    Family Anesthesia History:      History of Malignant Hyperthermia: no    History of Pseudocholinesterase Deficiency: no    Past Medical History:      Past Medical History:   Diagnosis Date    Abnormal Pap smear of cervix     HPV genital warts    Anemia     Anxiety     Arthritis     Asthma 10/11/2016    Bipolar 1 disorder     Diabetes mellitus, type 2     Dyslipidemia associated with type 2 diabetes mellitus 05/13/2019    General anesthetics causing adverse effect in therapeutic use     Genital warts     GERD (gastroesophageal reflux disease)     Herpes simplex virus (HSV) infection     Hyperlipidemia     Hypertension     Hypertension complicating diabetes 05/05/2019    Migraine with aura and without status  migrainosus, not intractable 2016    Mild persistent asthma without complication 10/11/2016    Morbid obesity with body mass index (BMI) of 45.0 to 49.9 in adult 2017    Obstructive sleep apnea     ALEN (obstructive sleep apnea)     2L per N/C q HS    Schizoaffective disorder, bipolar type 2019    Seasonal allergic rhinitis due to pollen 2019    Type 2 diabetes mellitus with hyperglycemia, with long-term current use of insulin 2017    Type 2 diabetes mellitus with hyperglycemia, without long-term current use of insulin 2017        Past Surgical History:      Past Surgical History:   Procedure Laterality Date    abcess removed right back      BELT ABDOMINOPLASTY      BREAST SURGERY Bilateral 1996    Reduction     SECTION      X 2    COLONOSCOPY      COLONOSCOPY N/A 2018    Procedure: colonoscopy for iron deficiency anemia;  Surgeon: Frankie Sanchez MD;  Location: [facility];  Service: Endoscopy;  Laterality: N/A;    COLONOSCOPY N/A 2018    Procedure: COLONOSCOPY;  Surgeon: Frankie Sanchez MD;  Location: [facility];  Service: Endoscopy;  Laterality: N/A;    COLONOSCOPY N/A 3/10/2023    Procedure: COLONOSCOPY;  Surgeon: Lalita Montes De Oca MD;  Location: [facility];  Service: Endoscopy;  Laterality: N/A;    EPIDURAL STEROID INJECTION INTO CERVICAL SPINE N/A 2023    Procedure: C6/7 IL ROCAEL RN IV Sedation;  Surgeon: Otto Phillips MD;  Location: [facility];  Service: Pain Management;  Laterality: N/A;    ESOPHAGOGASTRODUODENOSCOPY N/A 3/10/2023    Procedure: EGD (ESOPHAGOGASTRODUODENOSCOPY);  Surgeon: Lalita Montes De Oca MD;  Location: [facility];  Service: Endoscopy;  Laterality: N/A;    HYSTERECTOMY      w/ BSO; hypermenorrhea    MOUTH SURGERY      OOPHORECTOMY      hyst/bso, hypermenorrhea    ROBOT-ASSISTED CHOLECYSTECTOMY USING DA DARI XI N/A 1/3/2020    Procedure: XI ROBOTIC CHOLECYSTECTOMY;  Surgeon: Damir Driscoll MD;  Location: [facility]  OR;  Service: General;  Laterality: N/A;    TOTAL REDUCTION MAMMOPLASTY      TUBAL LIGATION          Social History:      Social History     Socioeconomic History    Marital status: Single   Tobacco Use    Smoking status: Never    Smokeless tobacco: Never   Substance and Sexual Activity    Alcohol use: Yes     Alcohol/week: 0.0 standard drinks of alcohol     Comment: socially  No alcohol 72h prior to sx    Drug use: No    Sexual activity: Yes     Partners: Male     Birth control/protection: Surgical     Comment: hyst   Social History Narrative    Long-term care nurse     Social Determinants of Health     Financial Resource Strain: High Risk (2/1/2023)    Overall Financial Resource Strain (CARDIA)     Difficulty of Paying Living Expenses: Very hard   Food Insecurity: Food Insecurity Present (2/1/2023)    Hunger Vital Sign     Worried About Running Out of Food in the Last Year: Sometimes true     Ran Out of Food in the Last Year: Sometimes true   Transportation Needs: No Transportation Needs (2/1/2023)    PRAPARE - Transportation     Lack of Transportation (Medical): No     Lack of Transportation (Non-Medical): No   Physical Activity: Inactive (2/1/2023)    Exercise Vital Sign     Days of Exercise per Week: 0 days     Minutes of Exercise per Session: 0 min   Stress: Stress Concern Present (2/1/2023)    [employer] [place] [employer] - Occupational Stress Questionnaire     Feeling of Stress : Very much   Social Connections: Moderately Isolated (2/1/2023)    Social Connection and Isolation Panel [NHANES]     Frequency of Communication with Friends and Family: More than three times a week     Frequency of Social Gatherings with Friends and Family: More than three times a week     Attends Muslim Services: More than 4 times per year     Active Member of Clubs or Organizations: No     Attends Club or Organization Meetings: Never     Marital Status:    Housing Stability: High Risk (2/1/2023)    Housing  "Stability Vital Sign     Unable to Pay for Housing in the Last Year: Yes     Number of Places Lived in the Last Year: 1     Unstable Housing in the Last Year: No        Family History:      Family History   Problem Relation Age of Onset    Diabetes Mother     Hyperlipidemia Mother     Hypertension Mother     Asthma Mother     COPD Mother     Glaucoma Mother     Thyroid disease Mother     Anesthesia problems Mother         "almost had a cardiac arrest" , blood clots    Hypertension Father     Hyperlipidemia Father     Cancer Father         Brain, lung, liver, kidney    No Known Problems Sister     Hypertension Brother     Alcohol abuse Brother     Heart disease Maternal Grandmother     Hyperlipidemia Maternal Grandmother     Hypertension Maternal Grandmother     Cataracts Maternal Grandmother     Diabetes Maternal Grandmother     Heart disease Maternal Grandfather     Hyperlipidemia Maternal Grandfather     Hypertension Maternal Grandfather     Glaucoma Maternal Grandfather     Cancer Maternal Grandfather     Cataracts Maternal Grandfather     Macular degeneration Maternal Grandfather     Diabetes Maternal Grandfather     Heart disease Paternal Grandmother     Hyperlipidemia Paternal Grandmother     Hypertension Paternal Grandmother     Cataracts Paternal Grandmother     Heart disease Paternal Grandfather     Hyperlipidemia Paternal Grandfather     Hypertension Paternal Grandfather     Cataracts Paternal Grandfather     Breast cancer Maternal Cousin     Breast cancer Maternal Cousin     Breast cancer Maternal Cousin     Breast cancer Maternal Cousin        Allergies:      Review of patient's allergies indicates:   Allergen Reactions    Codeine Itching    Hydromorphone Other (See Comments)     Can't wake up for long time if taken  Slow to wake up after surgery after receiving    Aleve [naproxen sodium]      Increases BP    Lyrica [pregabalin] Itching    Motrin [ibuprofen]      Increases BP    Neuromuscular blockers, " steroidal Hives     some    Latex, natural rubber Rash    Morphine Rash     itching    Norco [hydrocodone-acetaminophen] Itching, Rash and Hallucinations    Seconal [secobarbital sodium] Rash     itching    Tylox [oxycodone-acetaminophen] Rash       Medications:      Current Outpatient Medications   Medication Sig    ALPRAZolam (XANAX) 1 MG tablet Take 1 tablet (1 mg total) by mouth 2 (two) times daily as needed for Anxiety.    amlodipine-valsartan (EXFORGE)  mg per tablet Take 1 tablet by mouth once daily.    aspirin (ECOTRIN) 81 MG EC tablet Take 1 tablet by mouth daily    atorvastatin (LIPITOR) 20 MG tablet Take 1 tablet (20 mg total) by mouth every evening.    DULoxetine (CYMBALTA) 60 MG capsule Take 1 capsule (60 mg total) by mouth 2 (two) times daily.    ergocalciferol (VITAMIN D2) 50,000 unit Cap Take 1 capsule twice weekly for 3 weeks then weekly    fenofibrate (TRICOR) 145 MG tablet Take 1 tablet (145 mg total) by mouth once daily.    frovatriptan (FROVA) 2.5 MG tablet Take 1 tablet at onset of acute migraine. May take 1 more tablet 2 hours later if needed. (MAX 2 TABLET PER DAY. MAX 4 TABLETS PER WEEK.)    furosemide (LASIX) 20 MG tablet Take 1 tablet (20 mg total) by mouth once daily.    insulin aspart U-100 (NOVOLOG FLEXPEN U-100 INSULIN) 100 unit/mL (3 mL) InPn pen Inject 10 Units into the skin 3 (three) times daily before meals. Use per sliding scale up to 10 units 3 times a day before meals    lamoTRIgine (LAMICTAL) 150 MG Tab Take 1 tablet (150 mg total) by mouth every morning.    levocetirizine (XYZAL) 5 MG tablet Take 1 tablet (5 mg total) by mouth every evening.    linaCLOtide (LINZESS) 290 mcg Cap capsule Take 1 capsule (290 mcg total) by mouth before breakfast.    lurasidone (LATUDA) 80 mg Tab tablet Take 1 tablet (80 mg total) by mouth once daily.    magnesium oxide (MAG-OX) 400 mg (241.3 mg magnesium) tablet Take 1 tablet (400 mg total) by mouth once daily.    metFORMIN (GLUCOPHAGE) 1000  "MG tablet Take 1 tablet (1,000 mg total) by mouth 2 (two) times daily with meals.    metoprolol succinate (TOPROL-XL) 50 MG 24 hr tablet Take 1 tablet (50 mg total) by mouth every evening.    mirtazapine (REMERON) 15 MG tablet Take 1 tablet by mouth at bedtime.    montelukast (SINGULAIR) 10 mg tablet Take 10 mg by mouth.    omeprazole (PRILOSEC) 40 MG capsule Take 1 capsule (40 mg total) by mouth once daily.    secukinumab (COSENTYX PEN, 2 PENS,) 150 mg/mL PnIj Inject 300 mg (2 mL) into the skin every 28 days.    spironolactone (ALDACTONE) 25 MG tablet Take 1 tablet (25 mg total) by mouth once daily.    tirzepatide (MOUNJARO) 5 mg/0.5 mL PnIj Inject 5 mg into the skin every 7 days.    tiZANidine (ZANAFLEX) 4 MG tablet Take 2 tablets by mouth every 6 hours as needed    traMADoL (ULTRAM) 50 mg tablet Take 1 tablet (50 mg total) by mouth every 8 (eight) hours as needed for Pain.    BD INSULIN SYRINGE ULTRA-FINE 1/2 mL 30 gauge x 1/2" Syrg     blood sugar diagnostic (ONETOUCH ULTRA TEST) Strp 1 each by Misc.(Non-Drug; Combo Route) route 4 (four) times daily. [facility] Glucose Test Strips    blood sugar diagnostic (ONETOUCH ULTRA TEST) Strp Check blood glucose 4 times daily as directed and as needed (dispense insurance preferred brand or patient choice)    cycloSPORINE 0.05 % Drop Place 1 drop into both eyes 2 (two) times daily.    estradioL (ESTRACE) 0.01 % (0.1 mg/gram) vaginal cream Place 1 g vaginally twice a week.    flash glucose scanning reader (Zirtual AMY 2 READER) Misc Use as directed to check blood sugar    fluticasone propionate (FLONASE) 50 mcg/actuation nasal spray 2 sprays (100 mcg total) by Each Nare route once daily.    glucagon (BAQSIMI) 3 mg/actuation Spry 1 spray by Nasal route as needed (hypoglycemia). For emergency use. May repeat 1 time after 15 minutes    lancets (ONETOUCH DELICA PLUS LANCET) 30 gauge Misc Use as directed 3 times daily    pen needle, diabetic (BD ULTRA-FINE MINI PEN NEEDLE) 31 " "gauge x 3/16" Ndle Use one needle 5 times a day    pen needle, diabetic (BD ULTRA-FINE MINI PEN NEEDLE) 31 gauge x 3/16" Ndle Use 5 a day    pneumoc 20-tatiana conj-dip cr,PF, (PREVNAR-20, PF,) 0.5 mL Syrg injection Inject 0.5 mLs into the muscle.    predniSONE (DELTASONE) 10 MG tablet Take 1 tablet (10 mg total) by mouth once daily. (Patient not taking: Reported on 12/11/2023)    promethazine (PHENERGAN) 12.5 MG Tab     prucalopride (MOTEGRITY) 2 mg Tab Take 1 tablet (2 mg) by mouth once daily.     No current facility-administered medications for this visit.       Vitals:      Vitals:    12/11/23 0807   BP: 138/79   Pulse: 90   Resp: 14   Temp: 97.6 °F (36.4 °C)       Review of Systems:        Constitutional: Negative for fever, chills, weight loss, malaise/fatigue and diaphoresis.   HENT: Negative for hearing loss, ear pain, nosebleeds, congestion, sore throat, neck pain, tinnitus and ear discharge.    Eyes: Negative for blurred vision, double vision, photophobia, pain, discharge and redness.   Respiratory: Negative for cough, hemoptysis, sputum production, shortness of breath, wheezing and stridor.    Cardiovascular: Negative for chest pain, palpitations, orthopnea, claudication, leg swelling and PND.   Gastrointestinal: Negative for heartburn, nausea, vomiting, abdominal pain, diarrhea, constipation, blood in stool and melena.   Genitourinary: Negative for dysuria, urgency, frequency, hematuria and flank pain.   Musculoskeletal: Negative for myalgias, back pain, and falls. Positive for bilateral hand/finger pain, rates 10/10 associated with loss of  strength.  Skin: Negative for itching and rash.   Neurological: Negative for dizziness, tingling, tremors, sensory change, speech change, focal weakness, seizures, loss of consciousness, weakness and headaches.   Endo/Heme/Allergies: Negative for environmental allergies and polydipsia. Does not bruise/bleed easily.   Psychiatric/Behavioral: Negative for depression, " suicidal ideas, hallucinations, memory loss and substance abuse. The patient is not nervous/anxious and does not have insomnia.    All 14 systems reviewed and negative except as noted above.    Physical Exam:      Constitutional: Appears well-developed, well-nourished and in no acute distress.  Patient is oriented to person, place, and time.   Head: Normocephalic and atraumatic. Mucous membranes moist.  Neck: Neck supple no mass.   Cardiovascular: Normal rate and regular rhythm.  S1 S2 appreciated by ascultation.  Pulmonary/Chest: Effort normal and clear to auscultation bilaterally. No respiratory distress.   Abdomen: Soft. Non-tender and non-distended. Bowel sounds are normal.   Neurological: Patient is alert and oriented to person, place and time. Moves all extremities.  Skin: Warm and dry. No lesions.  Extremities: No clubbing, cyanosis or edema.    Laboratory data:      Reviewed and noted in plan where applicable. Please see chart for full laboratory data.    @BXEMCRICF17(cpk,cpkmb,troponini,mb)@ @SANZMLSOK99(poctglucose)@     Lab Results   Component Value Date    INR 1.0 06/26/2020    INR 0.9 11/23/2016       Lab Results   Component Value Date    WBC 8.03 10/09/2023    HGB 10.9 (L) 10/09/2023    HCT 35.4 (L) 10/09/2023    MCV 87 10/09/2023     (H) 10/09/2023       @[id number](GLU,NA,K,Cl,CO2,BUN,Creatinine,Calcium,MG)@    Predictors of intubation difficulty:       Morbid obesity? yes    Anatomically abnormal facies? no   Prominent incisors? no   Receding mandible? no   Short, thick neck? yes    Neck range of motion: normal   Dentition: No chipped, loose, or missing teeth.  Based on the Modified Mallampati, patient is a mallampati score: I (soft palate, uvula, fauces, and tonsillar pillars visible)    Cardiographics:      ECG: normal sinus rhythm    Echocardiogram in 2019 showed;    1 - Concentric hypertrophy.     2 - No wall motion abnormalities.     3 - Normal left ventricular systolic function (EF  60-65%).     4 - Impaired LV relaxation, normal LAP (grade 1 diastolic dysfunction).     5 - Normal right ventricular systolic function .     Imaging:      Chest x-ray:  Pending.    Assessment and Plan:      Preop examination  - Patient presents today at the request of Dr. Roth who plans on performing a CTR on 12/18.    Known risk factors for perioperative complications: Chronic pulmonary disease  Diabetes mellitus  HTN  Morbid Obesity  Difficulty with intubation is not anticipated.    Cardiac Risk Estimation: Based on the Revised Cardiac Risk index, patient is a Class II risk with a 6.0% risk of a major cardiac event in a low risk procedure.    1.) Preoperative workup as follows: hemoglobin, hematocrit, electrolytes, creatinine, glucose, liver function studies.  2.) Change in medication regimen before surgery: discontinue ASA 5 days before surgery, discontinue NSAIDs 5 days before surgery, hold Metformin 24 hours prior to surgery.  Patient was instructed to hold Mounjaro after today's dose and resume postoperatively.  Instructed to hold Exforge, Lasix, and Spironolactone  the morning of surgery. Instructed to only take 1/2 her usual dose of insulin the night before surgery with NO insulin the morning of surgery.  3.) Prophylaxis for cardiac events with perioperative beta-blockers: Continue Toprol XL.  4.) Invasive hemodynamic monitoring perioperatively: not indicated.  5.) Deep vein thrombosis prophylaxis postoperatively: intermittent pneumatic compression boots and regimen to be chosen by surgical team.  6.) Surveillance for postoperative MI with ECG immediately postoperatively and on postoperati ve days 1 and 2 AND troponin levels 24 hours postoperatively and on day 4 or hospital discharge (whichever comes first): not indicated.  7.) Current medications which may produce withdrawal symptoms if withheld perioperatively: None.  8.) Other measures:  None.      Bilateral carpal tunnel syndrome  - To undergo  surgery.  - See plan as per above.    Type 2 diabetes mellitus with hyperglycemia, with long-term current use of insulin  - Recent A1c 6.5 on 11/29.  - Patient currently on Mounjaro with last dose taken today.  She was instructed to hold next Monday's dose, and resume postoperatively.  - Instructed to hold Metformin 24 hours prior to surgery and resume postoperatively as directed.  - Instructed to only take 1/2 her usual dose of insulin the night before surgery with NO insulin the morning of surgery.  - ADA diet.    Essential hypertension  - BP well controlled.  - Continue Exforge, Spironolactone, and Lasix for now with instructions to hold the morning of surgery to avoid hypotension associated with anesthesia, and resume postoperatively as directed.  - Continue Toprol XL.    GERD (gastroesophageal reflux disease)  - Continue PPI.    Bipolar 1 disorder  - Continue home medications and outpatient f/u as directed.    Dyslipidemia associated with type 2 diabetes mellitus  - Continue home medications.    Fibromyalgia  - Continue home medications.    Nocturnal hypoxemia due to obesity - WITHOUT ALEN  - Continue 2L NC qhs.     Moderate persistent asthma without complication  - Continue Singulair.    BMI 40.0-44.9, adult  - BMI today 38.56.  - Self directed diet and weight loss.        Electronically signed by Anamaria Harris DNP, MOEP on 12/11/2023 at 8:03 AM.

## 2023-12-11 NOTE — DISCHARGE INSTRUCTIONS
To confirm, your doctor has instructed you: Surgery is scheduled for 12/18/2023.   Pre admit office will call the afternoon prior to surgery between 1PM and 3PM with arrival time.    Surgery will be at [facility],  The address is [address]. [place] [address].      IMPORTANT INSTRUCTIONS!    Do not eat or drink after 12 midnight, including water. Do not smoke or use chewing tobacco after 12 midnight!  OK to brush teeth, but no gum, candy, or mints!      *Take only these medicines with a small swallow of water-morning of surgery*     Cymbalta, lamictal, singulair, prilosec         ____ Stop taking all vitamins, herbal supplements, Aspirin, & NSAIDS (Ibuprofen, Advil, Aleve) 7 days prior to surgery, as these can thin your blood.    ____ Weight loss medication, such as Adipex and Phentermine, must be stopped 14 days prior to surgery, no exceptions!    *Diabetic Patients: If you take diabetic or weight loss medication, do NOT take morning of surgery unless instructed by Doctor. Metformin to be stopped 24 hrs prior to surgery. DO NOT take short-acting insulin the day of surgery. Only take HALF of your regular dose of long-acting insulin the night before surgery, unless instructed otherwise. Blood sugars will be checked in pre-op.   ~Ozempic/Mounjaro/Wegovy/Trulicity/Semaglutide injections must be stopped 7 days prior to surgery.     Please notify MD office if you develop an active infection, are prescribed antibiotics by someone other than the surgeon doing your surgery, or visit urgent care/ER.      Bathing Instructions:   The night before surgery and the morning prior to coming to the hospital:    - Shower & rinse your body as usual with anti-bacterial Soap (Dial or Lever 2000)   -Hibiclens (if indicated) use AFTER anti-bacterial soap; 1 packet PM/1 packet in AM on surgical site only   -Do not use hibiclens on your head, face, or genitals.    -Do not wash with anti-bacterial soap after you use  the hibiclens.    -Do not shave surgical site 5-7 days prior to surgery.    -Pubic hair 7 days prior to surgery (OBGYN/Urology only).       Additional Instructions:   __ No powder, lotions, creams, or body spray to skin   __ No deodorant if you are having: breast procedure, PORT, or upper shoulder surgery!   __ No nail polish or artificial nails       **SURGERY WILL BE CANCELLED IF ARTIFICIAL/NAIL POLISH IS PRESENT!!!**  __ Please remove all piercings and leave all jewelry at home.    **SURGERY WILL BE CANCELLED IF PIERCINGS ARE PRESENT!!!**    __ Dentures, Hearing Aids and Contact Lens need to be removed prior to the start of surgery.    __ Avoid Alcoholic beverages 3 days prior to surgery, as it can thin the blood, unless told otherwise by pre-admit department.  __ Females: may need to give a urine sample the morning of surgery;   **Please ask  for a specimen cup if you need to use the restroom prior to being called into pre-op.**  __ Males: Stop ED medications (Viagra, Cialis) 24 hrs prior to surgery.  __ Wear clean, loose-fitting clothing. Allow for dressings/bandages/surgical equipment   __ You must have transportation, and they MUST stay the entire time.   __  [facility] photo ID and [facility] Policy:   Only 1 adult allowed (over the age of 18) to accompany you and MUST STAY through the entire length of admission.     -Must have a ride home from a responsible adult that you know and trust.    -Medical Transport, Uber or Lyft can only be used if patient has a responsible adult to accompany them during ride home.    ~Your ride MUST STAY the entire time until you are discharged~        Post-Op Instructions: You will receive surgery post-op instructions by your Discharge Nurse prior to going home.   Surgical Site Infection:   Prevention of surgical site infections:   -Keep incisions clean and dry.   -Do not soak/submerge incisions in water until completely  healed.   -Do not apply lotions, powders, creams, or deodorants to site.   -Always make sure hands are cleaned with antibacterial soap/ alcohol-based  prior to touching the surgical site.       Signs and symptoms:               -Redness and pain around the area where you had surgery               -Drainage of cloudy fluid from your surgical wound               -Fever over 100.4 or chills     >>>Call Surgeon office/on-call Surgeon if you experience any of these signs & symptoms post-surgery @ [phone].    *If you are running late or have questions the morning of surgery before 7AM, please call the Pre-OP Department @ [phone].    *Please Call [facility] Pre-Admit Department for surgery instruction questions:  [phone]    *Payment questions:  [phone]    *Billing questions:  [phone]

## 2023-12-11 NOTE — ASSESSMENT & PLAN NOTE
- BP well controlled.  - Continue Exforge, Spironolactone, and Lasix for now with instructions to hold the morning of surgery to avoid hypotension associated with anesthesia, and resume postoperatively as directed.  - Continue Toprol XL.

## 2023-12-12 ENCOUNTER — TELEPHONE (OUTPATIENT)
Dept: PAIN MEDICINE | Facility: CLINIC | Age: 53
End: 2023-12-12
Payer: MEDICAID

## 2023-12-12 NOTE — PRE-PROCEDURE INSTRUCTIONS
Spoke with patient regarding procedure scheduled on 12.19     Arrival time 1130     Has patient been sick with fever or on antibiotics within the last 7 days? No     Does the patient have any open wounds, sores or rashes? Pt scheduled for carpal tunnel surgery on 12.18. ok to proceed per Dr. Phillips     Does the patient have any recent fractures? no     Has patient received a vaccination within the last 7 days? No     Received the COVID vaccination? yes     Has the patient stopped all medications as directed? na     Does patient have a pacemaker, defibrillator, or implantable stimulator? No     Does the patient have a ride to and from procedure and someone reliable to remain with patient?  Coordinating transportation. Aware of policy.      Is the patient diabetic? yes     Does the patient have sleep apnea? Or use O2 at home? duarte on cpap 2L hs     Is the patient receiving sedation? yes     Is the patient instructed to remain NPO beginning at midnight the night before their procedure? yes     Procedure location confirmed with patient? Yes     Covid- Denies signs/symptoms. Instructed to notify PAT/MD if any changes.

## 2023-12-12 NOTE — TELEPHONE ENCOUNTER
----- Message from Francoise Caballero sent at 12/12/2023  8:25 AM CST -----  Contact: Yolanda Mathew is calling in regards to upcoming surgery and would like a few questions answered.Please call back at  [phone].    Thanks  DAIJA

## 2023-12-14 ENCOUNTER — ANESTHESIA EVENT (OUTPATIENT)
Dept: SURGERY | Facility: HOSPITAL | Age: 53
End: 2023-12-14
Payer: MEDICAID

## 2023-12-14 NOTE — ANESTHESIA PREPROCEDURE EVALUATION
12/14/2023  Yolanda Betts is a 53 y.o., female.    Anesthesia Evaluation    I have reviewed the Patient Summary Reports.     I have reviewed the Nursing Notes. I have reviewed the NPO Status.   I have reviewed the Medications.     Review of Systems  Anesthesia Hx:  No problems with previous Anesthesia  Previous GA with easy mask vent, Grade 1 view with Cabrera 2.              Social:  Non-Smoker       Hematology/Oncology:  Hematology Normal                                     Cardiovascular:     Hypertension           hyperlipidemia    Pt seen and cleared by Cards with moderate risk.  Echo with EF 60%, unremarkable Holter.                         Pulmonary:    Asthma    Sleep Apnea Uses oxygen at night.               Renal/:  Renal/ Normal                 Hepatic/GI:     GERD             Neurological:    Neuromuscular Disease,  Headaches                                 Endocrine:  Diabetes           Psych:  Psychiatric History                  Physical Exam  General:  Morbid Obesity       Airway/Jaw/Neck:  Airway Findings: Mouth Opening: Normal     Tongue: Normal      General Airway Assessment: Adult      Mallampati: II   TM Distance: 4 - 6 cm   Jaw/Neck Findings:     Neck ROM: Normal ROM          Dental:  Dental Findings: In tact      Chest/Lungs:  Chest/Lungs Findings:  Clear to auscultation, Normal Respiratory Rate       Heart/Vascular:  Heart Findings: Rate: Normal  Rhythm: Regular Rhythm  Sounds: Normal                       Mental Status:  Mental Status Findings:  Cooperative, Alert and Oriented         Anesthesia Plan  Type of Anesthesia, risks & benefits discussed:  Anesthesia Type:  Gen Natural Airway, MAC    Patient's Preference:   Plan Factors:          Intra-op Monitoring Plan: standard ASA monitors and Standard ASA Monitors  Intra-op Monitoring Plan Comments:   Post Op Pain Control Plan:  multimodal analgesia and per primary service following discharge from PACU  Post Op Pain Control Plan Comments:     Induction:   IV  Beta Blocker:  Patient is on a Beta-Blocker and has received one dose within the past 24 hours (No further documentation required).       Informed Consent: Informed consent signed with the Patient and all parties understand the risks and agree with anesthesia plan.  All questions answered.  Anesthesia consent signed with patient.  ASA Score: 3     Day of Surgery Review of History & Physical:  There are no significant changes.  H&P Update referred to the surgeon/provider.          Ready For Surgery From Anesthesia Perspective.             Echo 06/06/2019 19:13  CONCLUSIONS     1 - Concentric hypertrophy.     2 - No wall motion abnormalities.     3 - Normal left ventricular systolic function (EF 60-65%).     4 - Impaired LV relaxation, normal LAP (grade 1 diastolic dysfunction).     5 - Normal right ventricular systolic function .     Stress: 12/07/2016  Impression: NORMAL MYOCARDIAL PERFUSION  1. The perfusion scan is free of evidence for myocardial ischemia or injury.   2. Resting wall motion is physiologic.   3. Resting LV function is normal.   4. The ventricular volumes are normal at rest and stress.   5. The extracardiac distribution of radioactivity is normal.     Result Notes for Complete PFT without bronchodilator - Clinic     Notes recorded by Sylvain Rainey MD on 7/8/2019 at 3:23 PM CDT  Normal spirometry. (FEV1/VC greater than or equal to LLN and FVC greater than or equal to LLN)   Normal airflow. (FEV1/VC greater than or equal to LLN)   Normal lung volumes. (TLC > or equal to LLN)   Maximum voluntary ventilation is moderately reduced. (MVV% predicted is 51% to 65% of predicted)   Mild reduction in diffusion capacity -unadjusted for hemoglobin (DLCO > or equal to 60% predicted and < LLN) .     Corrected for alveolar volume diffusion capacity was normal.         Physical  Exam  General: Morbid Obesity    Airway:  Mallampati: II   Mouth Opening: Normal  TM Distance: 4 - 6 cm  Tongue: Normal  Neck ROM: Normal ROM    Dental:  In tact    Chest/Lungs:  Clear to auscultation, Normal Respiratory Rate    Heart:  Rate: Normal  Rhythm: Regular Rhythm  Sounds: Normal        Anesthesia Plan  Type of Anesthesia, risks & benefits discussed:    Anesthesia Type: Gen Natural Airway, MAC  Intra-op Monitoring Plan: standard ASA monitors and Standard ASA Monitors  Post Op Pain Control Plan: multimodal analgesia and per primary service following discharge from PACU  Induction:  IV  Informed Consent: Informed consent signed with the Patient and all parties understand the risks and agree with anesthesia plan.  All questions answered.   ASA Score: 3  Day of Surgery Review of History & Physical: H&P Update referred to the surgeon/provider.    Ready For Surgery From Anesthesia Perspective.     .

## 2023-12-15 ENCOUNTER — TELEPHONE (OUTPATIENT)
Dept: ORTHOPEDICS | Facility: CLINIC | Age: 53
End: 2023-12-15
Payer: MEDICAID

## 2023-12-15 ENCOUNTER — PATIENT MESSAGE (OUTPATIENT)
Dept: PREADMISSION TESTING | Facility: HOSPITAL | Age: 53
End: 2023-12-15
Payer: MEDICAID

## 2023-12-15 RX ORDER — TIZANIDINE 4 MG/1
TABLET ORAL
Qty: 240 TABLET | Refills: 0 | OUTPATIENT
Start: 2023-12-15

## 2023-12-15 NOTE — TELEPHONE ENCOUNTER
The patients surgery is Monday I told her that the pre admit department will call you after 2 PM today to give her arrival time the patient verbalized understanding       ----- Message from Usha Jewell sent at 12/15/2023 10:17 AM CST -----  Pt would like the nurse to call her back regarding her surgery tomorrow. Call the pt back at .[phone]. x.EL

## 2023-12-15 NOTE — TELEPHONE ENCOUNTER
No care due was identified.  [facility] Embedded Care Due Messages. Reference number: [id number].   12/15/2023 1:04:20 PM CST

## 2023-12-18 ENCOUNTER — ANESTHESIA (OUTPATIENT)
Dept: SURGERY | Facility: HOSPITAL | Age: 53
End: 2023-12-18
Payer: MEDICAID

## 2023-12-18 ENCOUNTER — HOSPITAL ENCOUNTER (OUTPATIENT)
Facility: HOSPITAL | Age: 53
Discharge: HOME | End: 2023-12-18
Attending: ORTHOPAEDIC SURGERY | Admitting: ORTHOPAEDIC SURGERY
Payer: MEDICAID

## 2023-12-18 DIAGNOSIS — Z01.818 PRE-OP EXAM: Primary | ICD-10-CM

## 2023-12-18 DIAGNOSIS — G56.03 BILATERAL CARPAL TUNNEL SYNDROME: ICD-10-CM

## 2023-12-18 LAB
POCT GLUCOSE: 103 MG/DL (ref 70–110)
POCT GLUCOSE: 111 MG/DL (ref 70–110)

## 2023-12-18 PROCEDURE — 64721 CARPAL TUNNEL SURGERY: CPT | Mod: 50,,, | Performed by: ORTHOPAEDIC SURGERY

## 2023-12-18 PROCEDURE — 63600175 PHARM REV CODE 636 W HCPCS: Performed by: ORTHOPAEDIC SURGERY

## 2023-12-18 PROCEDURE — D9220A PRA ANESTHESIA: ICD-10-PCS | Mod: ,,, | Performed by: NURSE ANESTHETIST, CERTIFIED REGISTERED

## 2023-12-18 PROCEDURE — 71000015 HC POSTOP RECOV 1ST HR: Performed by: ORTHOPAEDIC SURGERY

## 2023-12-18 PROCEDURE — 99499 NO LOS: ICD-10-PCS | Mod: ,,,

## 2023-12-18 PROCEDURE — 99499 UNLISTED E&M SERVICE: CPT | Mod: ,,,

## 2023-12-18 PROCEDURE — 63600175 PHARM REV CODE 636 W HCPCS: Performed by: NURSE ANESTHETIST, CERTIFIED REGISTERED

## 2023-12-18 PROCEDURE — 25000003 PHARM REV CODE 250: Performed by: NURSE ANESTHETIST, CERTIFIED REGISTERED

## 2023-12-18 PROCEDURE — 37000008 HC ANESTHESIA 1ST 15 MINUTES: Performed by: ORTHOPAEDIC SURGERY

## 2023-12-18 PROCEDURE — 37000009 HC ANESTHESIA EA ADD 15 MINS: Performed by: ORTHOPAEDIC SURGERY

## 2023-12-18 PROCEDURE — 71000033 HC RECOVERY, INTIAL HOUR: Performed by: ORTHOPAEDIC SURGERY

## 2023-12-18 PROCEDURE — 36000706: Performed by: ORTHOPAEDIC SURGERY

## 2023-12-18 PROCEDURE — 63600175 PHARM REV CODE 636 W HCPCS: Performed by: ANESTHESIOLOGY

## 2023-12-18 PROCEDURE — 71000039 HC RECOVERY, EACH ADD'L HOUR: Performed by: ORTHOPAEDIC SURGERY

## 2023-12-18 PROCEDURE — D9220A PRA ANESTHESIA: Mod: ,,, | Performed by: NURSE ANESTHETIST, CERTIFIED REGISTERED

## 2023-12-18 PROCEDURE — 64721 PR REVISE MEDIAN N/CARPAL TUNNEL SURG: ICD-10-PCS | Mod: 50,,, | Performed by: ORTHOPAEDIC SURGERY

## 2023-12-18 PROCEDURE — 36000707: Performed by: ORTHOPAEDIC SURGERY

## 2023-12-18 PROCEDURE — 82962 GLUCOSE BLOOD TEST: CPT | Performed by: ORTHOPAEDIC SURGERY

## 2023-12-18 RX ORDER — DEXMEDETOMIDINE HYDROCHLORIDE 100 UG/ML
INJECTION, SOLUTION INTRAVENOUS
Status: DISCONTINUED | OUTPATIENT
Start: 2023-12-18 | End: 2023-12-18

## 2023-12-18 RX ORDER — LIDOCAINE HYDROCHLORIDE 20 MG/ML
INJECTION, SOLUTION INFILTRATION; PERINEURAL
Status: DISCONTINUED
Start: 2023-12-18 | End: 2023-12-18 | Stop reason: HOSPADM

## 2023-12-18 RX ORDER — ONDANSETRON 2 MG/ML
INJECTION INTRAMUSCULAR; INTRAVENOUS
Status: DISCONTINUED | OUTPATIENT
Start: 2023-12-18 | End: 2023-12-18

## 2023-12-18 RX ORDER — BUPIVACAINE HYDROCHLORIDE 2.5 MG/ML
INJECTION, SOLUTION EPIDURAL; INFILTRATION; INTRACAUDAL
Status: DISCONTINUED
Start: 2023-12-18 | End: 2023-12-18 | Stop reason: HOSPADM

## 2023-12-18 RX ORDER — FENTANYL CITRATE 50 UG/ML
25 INJECTION, SOLUTION INTRAMUSCULAR; INTRAVENOUS EVERY 5 MIN PRN
Status: DISCONTINUED | OUTPATIENT
Start: 2023-12-18 | End: 2023-12-18 | Stop reason: HOSPADM

## 2023-12-18 RX ORDER — PROPOFOL 10 MG/ML
VIAL (ML) INTRAVENOUS
Status: DISCONTINUED | OUTPATIENT
Start: 2023-12-18 | End: 2023-12-18

## 2023-12-18 RX ORDER — TRAMADOL HYDROCHLORIDE 50 MG/1
50 TABLET ORAL EVERY 6 HOURS PRN
Qty: 28 TABLET | Refills: 0 | Status: SHIPPED | OUTPATIENT
Start: 2023-12-18 | End: 2024-02-21

## 2023-12-18 RX ORDER — MEPERIDINE HYDROCHLORIDE 25 MG/ML
12.5 INJECTION INTRAMUSCULAR; INTRAVENOUS; SUBCUTANEOUS ONCE AS NEEDED
Status: DISCONTINUED | OUTPATIENT
Start: 2023-12-18 | End: 2023-12-18 | Stop reason: HOSPADM

## 2023-12-18 RX ORDER — DEXAMETHASONE SODIUM PHOSPHATE 4 MG/ML
INJECTION, SOLUTION INTRA-ARTICULAR; INTRALESIONAL; INTRAMUSCULAR; INTRAVENOUS; SOFT TISSUE
Status: DISCONTINUED | OUTPATIENT
Start: 2023-12-18 | End: 2023-12-18

## 2023-12-18 RX ORDER — LIDOCAINE HYDROCHLORIDE 20 MG/ML
INJECTION, SOLUTION EPIDURAL; INFILTRATION; INTRACAUDAL; PERINEURAL
Status: DISCONTINUED | OUTPATIENT
Start: 2023-12-18 | End: 2023-12-18

## 2023-12-18 RX ORDER — ONDANSETRON 2 MG/ML
4 INJECTION INTRAMUSCULAR; INTRAVENOUS DAILY PRN
Status: DISCONTINUED | OUTPATIENT
Start: 2023-12-18 | End: 2023-12-18 | Stop reason: HOSPADM

## 2023-12-18 RX ORDER — CYCLOBENZAPRINE HCL 10 MG
10 TABLET ORAL 3 TIMES DAILY PRN
Qty: 30 TABLET | Refills: 0 | Status: SHIPPED | OUTPATIENT
Start: 2023-12-18 | End: 2024-01-04 | Stop reason: ALTCHOICE

## 2023-12-18 RX ORDER — BUPIVACAINE HYDROCHLORIDE 2.5 MG/ML
INJECTION, SOLUTION EPIDURAL; INFILTRATION; INTRACAUDAL
Status: DISCONTINUED | OUTPATIENT
Start: 2023-12-18 | End: 2023-12-18 | Stop reason: HOSPADM

## 2023-12-18 RX ORDER — SODIUM CHLORIDE 0.9 % (FLUSH) 0.9 %
10 SYRINGE (ML) INJECTION
Status: DISCONTINUED | OUTPATIENT
Start: 2023-12-18 | End: 2023-12-18 | Stop reason: HOSPADM

## 2023-12-18 RX ORDER — FENTANYL CITRATE 50 UG/ML
INJECTION, SOLUTION INTRAMUSCULAR; INTRAVENOUS
Status: DISCONTINUED | OUTPATIENT
Start: 2023-12-18 | End: 2023-12-18

## 2023-12-18 RX ORDER — CHLORHEXIDINE GLUCONATE ORAL RINSE 1.2 MG/ML
10 SOLUTION DENTAL 2 TIMES DAILY
Status: DISCONTINUED | OUTPATIENT
Start: 2023-12-18 | End: 2023-12-18 | Stop reason: HOSPADM

## 2023-12-18 RX ORDER — MIDAZOLAM HYDROCHLORIDE 1 MG/ML
INJECTION, SOLUTION INTRAMUSCULAR; INTRAVENOUS
Status: DISCONTINUED | OUTPATIENT
Start: 2023-12-18 | End: 2023-12-18

## 2023-12-18 RX ADMIN — FENTANYL CITRATE 25 MCG: 0.05 INJECTION, SOLUTION INTRAMUSCULAR; INTRAVENOUS at 11:12

## 2023-12-18 RX ADMIN — FENTANYL CITRATE 25 MCG: 50 INJECTION, SOLUTION INTRAMUSCULAR; INTRAVENOUS at 10:12

## 2023-12-18 RX ADMIN — DEXMEDETOMIDINE HYDROCHLORIDE 4 MCG: 100 INJECTION, SOLUTION INTRAVENOUS at 10:12

## 2023-12-18 RX ADMIN — PROPOFOL 200 MG: 10 INJECTION, EMULSION INTRAVENOUS at 10:12

## 2023-12-18 RX ADMIN — MIDAZOLAM 2 MG: 1 INJECTION INTRAMUSCULAR; INTRAVENOUS at 10:12

## 2023-12-18 RX ADMIN — LIDOCAINE HYDROCHLORIDE 60 MG: 20 INJECTION, SOLUTION EPIDURAL; INFILTRATION; INTRACAUDAL; PERINEURAL at 10:12

## 2023-12-18 RX ADMIN — ONDANSETRON 4 MG: 2 INJECTION INTRAMUSCULAR; INTRAVENOUS at 10:12

## 2023-12-18 RX ADMIN — DEXTROSE 2 G: 50 INJECTION, SOLUTION INTRAVENOUS at 10:12

## 2023-12-18 RX ADMIN — DEXAMETHASONE SODIUM PHOSPHATE 8 MG: 4 INJECTION, SOLUTION INTRA-ARTICULAR; INTRALESIONAL; INTRAMUSCULAR; INTRAVENOUS; SOFT TISSUE at 10:12

## 2023-12-18 RX ADMIN — ONDANSETRON 4 MG: 2 INJECTION INTRAMUSCULAR; INTRAVENOUS at 11:12

## 2023-12-18 NOTE — ANESTHESIA PROCEDURE NOTES
Intubation    Date/Time: 12/18/2023 10:23 AM    Performed by: Shay Walker CRNA  Authorized by: Joel Aguilar MD    Intubation:     Induction:  Intravenous    Intubated:  Postinduction    Mask Ventilation:  Not attempted    Attempts:  1    Attempted By:  CRNA    Difficult Airway Encountered?: No      Complications:  None    Airway Device:  Supraglottic airway/LMA    Style/Cuff Inflation:  Cuffed (inflated to minimal occlusive pressure)    Secured at:  The lips    Placement Verified By:  Capnometry    Complicating Factors:  None    Findings Post-Intubation:  BS equal bilateral and atraumatic/condition of teeth unchanged  Notes:      #3 LMA inserted without difficulty

## 2023-12-18 NOTE — DISCHARGE SUMMARY
[facility]  Discharge Note  Short Stay    Procedure(s) (LRB):  RELEASE, CARPAL TUNNEL (Bilateral)      OUTCOME: Patient tolerated treatment/procedure well without complication and is now ready for discharge.    DISPOSITION: Home or Self Care    FINAL DIAGNOSIS:  Bilateral carpal tunnel syndrome    FOLLOWUP: In clinic    DISCHARGE INSTRUCTIONS:    Discharge Procedure Orders   Ambulatory referral/consult to Pre-Admit   Standing Status: Future   Referral Priority: Routine Referral Type: Consultation   Referral Reason: Specialty Services Required   Requested Specialty: Internal Medicine   Number of Visits Requested: 1     Diet general     Call MD for:  temperature >100.4     Call MD for:  persistent nausea and vomiting     Call MD for:  severe uncontrolled pain     Call MD for:  difficulty breathing, headache or visual disturbances     Call MD for:  redness, tenderness, or signs of infection (pain, swelling, redness, odor or green/yellow discharge around incision site)     Call MD for:  hives     Call MD for:  persistent dizziness or light-headedness     Call MD for:  extreme fatigue        TIME SPENT ON DISCHARGE:  20 minutes

## 2023-12-18 NOTE — ANESTHESIA POSTPROCEDURE EVALUATION
Anesthesia Post Evaluation    Patient: Yolanda Betts    Procedure(s) Performed: Procedure(s) (LRB):  RELEASE, CARPAL TUNNEL (Bilateral)    Final Anesthesia Type: general      Patient location during evaluation: PACU  Patient participation: Yes- Able to Participate  Level of consciousness: awake  Post-procedure vital signs: reviewed and stable  Pain management: adequate  Airway patency: patent    PONV status at discharge: No PONV  Anesthetic complications: no      Cardiovascular status: stable  Respiratory status: unassisted  Hydration status: euvolemic  Follow-up not needed.              Vitals Value Taken Time   /52 12/18/23 1123   Temp 36.6 °C (97.9 °F) 12/18/23 1059   Pulse 80 12/18/23 1125   Resp 14 12/18/23 1125   SpO2 92 % 12/18/23 1125   Vitals shown include unvalidated device data.      No case tracking events are documented in the log.      Pain/Elif Score: Elif Score: 8 (12/18/2023 10:59 AM)

## 2023-12-18 NOTE — OP NOTE
[facility]  Orthopedic Surgery  Operative Note    SUMMARY     Date of Procedure: 12/18/2023   Assistant: None    Procedure: Procedure(s) (LRB):  RELEASE, CARPAL TUNNEL (Bilateral)       Surgeon(s) and Role:     * Neville Roth MD - Primary    Assisting Surgeon:  Sonja HUYNH    Pre-Operative Diagnosis: Bilateral carpal tunnel syndrome [G56.03]    Post-Operative Diagnosis: Post-Op Diagnosis Codes:     * Bilateral carpal tunnel syndrome [G56.03]    Anesthesia:  Local with sedation    Technical Procedures Used:  bilateral carpal tunnel release    Description of the Findings of the Procedure:  The patient was taken to the operating room where general anesthesia was administered.  After exsanguination of the extremity a proximal tourniquet was inflated to 250 mm of mercury.  Satisfactory anesthesia had been achieved the left hand was prepped and draped in the usual sterile fashion.  The patient did receive 2 g of Ancef intravenously preoperatively.  At this time a longitudinal incision was made in line with the 4th ray over the transverse carpal ligament.  The incision was extended using blunt and sharp dissection under 3.5 loupe magnification.  The transverse carpal ligament was identified and sharply incised under direct vision.  A complete release was performed and verified by the surgeon's small finger both proximally and distally.  No additional pathology was encountered.  Satisfactory release had been achieved skin was closed using horizontal mattress 4 0 nylon suture.  At this time attention was directed to the right carpal tunnel.  After the arm was prepped and draped in the usual sterile fashion proximal tourniquet was inflated to 250 mmHg.  At this time a longitudinal incision was made in line with the 4th ray over the transverse carpal ligament.  The incision extended using blunt and sharp dissection using 3.5 loupe magnification.  The transverse carpal ligament was sharply  incised under direct vision.  A complete release was performed and verified by the surgeon's small finger both proximally distally.  No additional pathology was encountered satisfactory release had been achieved skin was closed using horizontal mattress 4-0 nylon suture.  Xeroform gauze 4x4s and 2 ABD pads were over wrapped with 3 in gauze dressing.  The patient tolerated the procedure well and was transferred to the recovery room in satisfactory condition.      Complications: No    Estimated Blood Loss (EBL): 5cc                        Condition: Good    Disposition: PACU - hemodynamically stable.    Attestation: I was present and scrubbed for the entire procedure.

## 2023-12-18 NOTE — DISCHARGE INSTRUCTIONS
After Hand Surgery  After surgery, the better you take care of yourself--especially your hand--the sooner it will heal. Follow your surgeons instructions. Try not to bump your hand, and dont move or lift anything while youre still wearing bandages, a splint, or a cast.  Care for your hand    Keep your hand elevated above heart level as much as possible for the first several days after surgery. This helps reduce swelling and pain.  To help prevent infection and speed healing, take care not to get your cast or bandages wet.  Keep dressing clean, dry, & intact until your follow up appointment.   Relieve pain as directed  Your surgeon may prescribe pain medicine or suggest you take an anti-inflammatory medicine. You might also be instructed to apply ice (or another cold source) to your hand. If you use ice cubes, put them in a plastic bag and rest it on top of your bandages. Leave the cold source on your hand for as long as its comfortable. Do this several times a day for the first few days after surgery. It may take several minutes before you can feel the cold through the cast or bandages.  Follow up with your surgeon  During a follow-up visit after surgery, your surgeon will check your progress. The stitches, bandages, splint, or cast may be removed. A new cast or splint may be placed. If your hand has healed enough, your surgeon may prescribe exercises.  Do prescribed hand exercises  Your surgeon may recommend that you do exercises. These may be done under the guidance of a physical or occupational therapist. The exercises strengthen your hand, help you regain flexibility, and restore proper function. Do the exercises as advised.  Call your surgeon if you have...  A fever higher than 100.4°F (38.0°C) taken by mouth  Side effects from your medicine, such as prolonged nausea  A wet or loose dressing, or a dressing that is too tight  Excessive bleeding  Increased, ongoing pain or numbness  Signs of infection (such  as drainage, warmth, or redness) at the incision site   Date Last Reviewed: 11/11/2015  © 9389-1299 [employer] [address] [place] [address], [place] [address]. All rights reserved. This information is not intended as a substitute for professional medical care. Always follow your healthcare professional's instructions.

## 2023-12-19 ENCOUNTER — HOSPITAL ENCOUNTER (OUTPATIENT)
Facility: HOSPITAL | Age: 53
Discharge: HOME | End: 2023-12-19
Attending: PHYSICAL MEDICINE & REHABILITATION | Admitting: PHYSICAL MEDICINE & REHABILITATION
Payer: MEDICAID

## 2023-12-19 VITALS
BODY MASS INDEX: 39.2 KG/M2 | WEIGHT: 174.25 LBS | HEART RATE: 75 BPM | HEIGHT: 56 IN | SYSTOLIC BLOOD PRESSURE: 119 MMHG | RESPIRATION RATE: 22 BRPM | TEMPERATURE: 98 F | OXYGEN SATURATION: 95 % | DIASTOLIC BLOOD PRESSURE: 68 MMHG

## 2023-12-19 VITALS
SYSTOLIC BLOOD PRESSURE: 118 MMHG | BODY MASS INDEX: 40.03 KG/M2 | WEIGHT: 177.94 LBS | DIASTOLIC BLOOD PRESSURE: 68 MMHG | HEART RATE: 86 BPM | TEMPERATURE: 98 F | RESPIRATION RATE: 12 BRPM | OXYGEN SATURATION: 96 % | HEIGHT: 56 IN

## 2023-12-19 DIAGNOSIS — M47.812 CERVICAL SPONDYLOSIS: Primary | ICD-10-CM

## 2023-12-19 LAB — POCT GLUCOSE: 102 MG/DL (ref 70–110)

## 2023-12-19 PROCEDURE — 99152 MOD SED SAME PHYS/QHP 5/>YRS: CPT | Performed by: PHYSICAL MEDICINE & REHABILITATION

## 2023-12-19 PROCEDURE — 82962 GLUCOSE BLOOD TEST: CPT | Performed by: PHYSICAL MEDICINE & REHABILITATION

## 2023-12-19 PROCEDURE — 64490 INJ PARAVERT F JNT C/T 1 LEV: CPT | Mod: 50 | Performed by: PHYSICAL MEDICINE & REHABILITATION

## 2023-12-19 PROCEDURE — 64490 INJ PARAVERT F JNT C/T 1 LEV: CPT | Mod: 50,,, | Performed by: PHYSICAL MEDICINE & REHABILITATION

## 2023-12-19 PROCEDURE — 64490 PR INJ DX/THER AGNT PARAVERT FACET JOINT,IMG GUIDE,CERV/THORAC, 1ST LEVEL: ICD-10-PCS | Mod: 50,,, | Performed by: PHYSICAL MEDICINE & REHABILITATION

## 2023-12-19 PROCEDURE — 64491 PR INJ DX/THER AGNT PARAVERT FACET JOINT,IMG GUIDE,CERV/THORAC, 2ND LEVEL: ICD-10-PCS | Mod: 50,,, | Performed by: PHYSICAL MEDICINE & REHABILITATION

## 2023-12-19 PROCEDURE — 64491 INJ PARAVERT F JNT C/T 2 LEV: CPT | Mod: 50,,, | Performed by: PHYSICAL MEDICINE & REHABILITATION

## 2023-12-19 PROCEDURE — 63600175 PHARM REV CODE 636 W HCPCS: Performed by: PHYSICAL MEDICINE & REHABILITATION

## 2023-12-19 PROCEDURE — 64491 INJ PARAVERT F JNT C/T 2 LEV: CPT | Mod: 50 | Performed by: PHYSICAL MEDICINE & REHABILITATION

## 2023-12-19 RX ORDER — BUPIVACAINE HYDROCHLORIDE 5 MG/ML
INJECTION, SOLUTION EPIDURAL; INTRACAUDAL
Status: DISCONTINUED | OUTPATIENT
Start: 2023-12-19 | End: 2023-12-19 | Stop reason: HOSPADM

## 2023-12-19 RX ORDER — ONDANSETRON 2 MG/ML
4 INJECTION INTRAMUSCULAR; INTRAVENOUS ONCE AS NEEDED
Status: DISCONTINUED | OUTPATIENT
Start: 2023-12-19 | End: 2023-12-19 | Stop reason: HOSPADM

## 2023-12-19 RX ORDER — MIDAZOLAM HYDROCHLORIDE 1 MG/ML
INJECTION, SOLUTION INTRAMUSCULAR; INTRAVENOUS
Status: DISCONTINUED | OUTPATIENT
Start: 2023-12-19 | End: 2023-12-19 | Stop reason: HOSPADM

## 2023-12-19 RX ORDER — FENTANYL CITRATE 50 UG/ML
INJECTION, SOLUTION INTRAMUSCULAR; INTRAVENOUS
Status: DISCONTINUED | OUTPATIENT
Start: 2023-12-19 | End: 2023-12-19 | Stop reason: HOSPADM

## 2023-12-19 RX ORDER — BLOOD-GLUCOSE SENSOR
EACH MISCELLANEOUS
Qty: 3 EACH | Refills: 11 | Status: SHIPPED | OUTPATIENT
Start: 2023-12-19

## 2023-12-19 NOTE — H&P
HPI  Patient presenting for Procedure(s) (LRB):  Bilateral C5-7 MBB (diagnostic) (Bilateral)     No health changes since previous encounter    Past Medical History:   Diagnosis Date    Abnormal Pap smear of cervix     HPV genital warts    Anemia     Anxiety     Arthritis     Asthma 10/11/2016    Bipolar 1 disorder     Diabetes mellitus, type 2     Dyslipidemia associated with type 2 diabetes mellitus 2019    General anesthetics causing adverse effect in therapeutic use     Genital warts     GERD (gastroesophageal reflux disease)     Herpes simplex virus (HSV) infection     Hyperlipidemia     Hypertension     Hypertension complicating diabetes 2019    Migraine with aura and without status migrainosus, not intractable 2016    Mild persistent asthma without complication 10/11/2016    Morbid obesity with body mass index (BMI) of 45.0 to 49.9 in adult 2017    Obstructive sleep apnea     ALEN (obstructive sleep apnea)     2L per N/C q HS    Schizoaffective disorder, bipolar type 2019    Seasonal allergic rhinitis due to pollen 2019    Type 2 diabetes mellitus with hyperglycemia, with long-term current use of insulin 2017    Type 2 diabetes mellitus with hyperglycemia, without long-term current use of insulin 2017     Past Surgical History:   Procedure Laterality Date    abcess removed right back      BELT ABDOMINOPLASTY  1996    BREAST SURGERY Bilateral     Reduction    CARPAL TUNNEL RELEASE Bilateral 2023    Procedure: RELEASE, CARPAL TUNNEL;  Surgeon: Neville Roth MD;  Location: [facility];  Service: Orthopedics;  Laterality: Bilateral;  bilateral carpal tunnel release     SECTION      X 2    COLONOSCOPY      COLONOSCOPY N/A 2018    Procedure: colonoscopy for iron deficiency anemia;  Surgeon: Frankie Sanchez MD;  Location: [facility];  Service: Endoscopy;  Laterality: N/A;    COLONOSCOPY N/A 2018    Procedure: COLONOSCOPY;  Surgeon:  Frankie Sanchez MD;  Location: [facility] ENDO;  Service: Endoscopy;  Laterality: N/A;    COLONOSCOPY N/A 3/10/2023    Procedure: COLONOSCOPY;  Surgeon: Lalita Montes De Oca MD;  Location: [facility];  Service: Endoscopy;  Laterality: N/A;    EPIDURAL STEROID INJECTION INTO CERVICAL SPINE N/A 1/31/2023    Procedure: C6/7 IL ROCAEL RN IV Sedation;  Surgeon: Otto Phillips MD;  Location: [facility] PAIN MGT;  Service: Pain Management;  Laterality: N/A;    ESOPHAGOGASTRODUODENOSCOPY N/A 3/10/2023    Procedure: EGD (ESOPHAGOGASTRODUODENOSCOPY);  Surgeon: Lalita Montes De Oca MD;  Location: [facility];  Service: Endoscopy;  Laterality: N/A;    HYSTERECTOMY      w/ BSO; hypermenorrhea    MOUTH SURGERY  1996    OOPHORECTOMY      hyst/bso, hypermenorrhea    ROBOT-ASSISTED CHOLECYSTECTOMY USING DA DARI XI N/A 1/3/2020    Procedure: XI ROBOTIC CHOLECYSTECTOMY;  Surgeon: Damir Driscoll MD;  Location: [facility] OR;  Service: General;  Laterality: N/A;    TOTAL REDUCTION MAMMOPLASTY      TUBAL LIGATION       Review of patient's allergies indicates:   Allergen Reactions    Codeine Itching    Hydromorphone Other (See Comments)     Can't wake up for long time if taken  Slow to wake up after surgery after receiving    Aleve [naproxen sodium]      Increases BP    Lyrica [pregabalin] Itching    Motrin [ibuprofen]      Increases BP    Neuromuscular blockers, steroidal Hives     some    Latex, natural rubber Rash    Morphine Rash     itching    Norco [hydrocodone-acetaminophen] Itching, Rash and Hallucinations    Seconal [secobarbital sodium] Rash     itching    Tylox [oxycodone-acetaminophen] Rash        Current Facility-Administered Medications on File Prior to Encounter   Medication Dose Route Frequency Provider Last Rate Last Admin    [DISCONTINUED] BUPivacaine (PF) 0.25% (2.5 mg/ml) (MARCAINE) 0.25 % (2.5 mg/mL) injection             [DISCONTINUED] chlorhexidine 0.12 % solution 10 mL  10 mL Mouth/Throat BID Neville Roth MD         [DISCONTINUED] fentaNYL 50 mcg/mL injection 25 mcg  25 mcg Intravenous Q5 Min PRN Joel Aguilar MD   25 mcg at 12/18/23 1132    [DISCONTINUED] LIDOcaine HCL 20 mg/ml (2%) 20 mg/mL (2 %) injection             [DISCONTINUED] LORazepam (ATIVAN) injection 0.25 mg  0.25 mg Intravenous Once PRN Joel Aguilar MD        [DISCONTINUED] meperidine (PF) injection 12.5 mg  12.5 mg Intravenous Once PRN Joel Aguilar MD        [DISCONTINUED] ondansetron injection 4 mg  4 mg Intravenous Daily PRN Joel Aguilar MD   4 mg at 12/18/23 1142    [DISCONTINUED] sodium chloride 0.9% flush 10 mL  10 mL Intravenous PRN Neville Roht MD         Current Outpatient Medications on File Prior to Encounter   Medication Sig Dispense Refill    ALPRAZolam (XANAX) 1 MG tablet Take 1 tablet (1 mg total) by mouth 2 (two) times daily as needed for Anxiety. 60 tablet 1    amlodipine-valsartan (EXFORGE)  mg per tablet Take 1 tablet by mouth once daily. 90 tablet 3    aspirin (ECOTRIN) 81 MG EC tablet Take 1 tablet by mouth daily 30 tablet 0    atorvastatin (LIPITOR) 20 MG tablet Take 1 tablet (20 mg total) by mouth every evening. 90 tablet 3    cyclobenzaprine (FLEXERIL) 10 MG tablet Take 1 tablet (10 mg total) by mouth 3 (three) times daily as needed for Muscle spasms. 30 tablet 0    DULoxetine (CYMBALTA) 60 MG capsule Take 1 capsule (60 mg total) by mouth 2 (two) times daily. 180 capsule 3    ergocalciferol (VITAMIN D2) 50,000 unit Cap Take 1 capsule twice weekly for 3 weeks then weekly 12 capsule 3    fenofibrate (TRICOR) 145 MG tablet Take 1 tablet (145 mg total) by mouth once daily. 90 tablet 3    furosemide (LASIX) 20 MG tablet Take 1 tablet (20 mg total) by mouth once daily. 90 tablet 3    lamoTRIgine (LAMICTAL) 150 MG Tab Take 1 tablet (150 mg total) by mouth every morning. 30 tablet 2    levocetirizine (XYZAL) 5 MG tablet Take 1 tablet (5 mg total) by mouth every evening. 30 tablet 11    linaCLOtide (LINZESS)  "290 mcg Cap capsule Take 1 capsule (290 mcg total) by mouth before breakfast. 30 capsule 5    lurasidone (LATUDA) 80 mg Tab tablet Take 1 tablet (80 mg total) by mouth once daily. 30 tablet 2    magnesium oxide (MAG-OX) 400 mg (241.3 mg magnesium) tablet Take 1 tablet (400 mg total) by mouth once daily. 90 tablet 3    metFORMIN (GLUCOPHAGE) 1000 MG tablet Take 1 tablet (1,000 mg total) by mouth 2 (two) times daily with meals. 180 tablet 1    metoprolol succinate (TOPROL-XL) 50 MG 24 hr tablet Take 1 tablet (50 mg total) by mouth every evening. 30 tablet 11    mirtazapine (REMERON) 15 MG tablet Take 1 tablet by mouth at bedtime. 30 tablet 1    montelukast (SINGULAIR) 10 mg tablet Take 10 mg by mouth.      omeprazole (PRILOSEC) 40 MG capsule Take 1 capsule (40 mg total) by mouth once daily. 30 capsule 11    prucalopride (MOTEGRITY) 2 mg Tab Take 1 tablet (2 mg) by mouth once daily. 30 tablet 11    spironolactone (ALDACTONE) 25 MG tablet Take 1 tablet (25 mg total) by mouth once daily. 90 tablet 3    tiZANidine (ZANAFLEX) 4 MG tablet Take 2 tablets by mouth every 6 hours as needed 240 tablet 0    traMADoL (ULTRAM) 50 mg tablet Take 1 tablet (50 mg total) by mouth every 6 (six) hours as needed for Pain. 28 tablet 0    BD INSULIN SYRINGE ULTRA-FINE 1/2 mL 30 gauge x 1/2" Syrg   0    blood sugar diagnostic (ONETOUCH ULTRA TEST) Strp 1 each by Misc.(Non-Drug; Combo Route) route 4 (four) times daily. [facility] Glucose Test Strips 400 strip 3    blood sugar diagnostic (ONETOUCH ULTRA TEST) Strp Check blood glucose 4 times daily as directed and as needed (dispense insurance preferred brand or patient choice) 200 each 5    cycloSPORINE 0.05 % Drop Place 1 drop into both eyes 2 (two) times daily. 5.5 mL 11    estradioL (ESTRACE) 0.01 % (0.1 mg/gram) vaginal cream Place 1 g vaginally twice a week. 42.5 g 2    flash glucose scanning reader ([employer] AMY 2 READER) Misc Use as directed to check blood sugar 1 each 1    fluticasone " "propionate (FLONASE) 50 mcg/actuation nasal spray 2 sprays (100 mcg total) by Each Nare route once daily. 16 g 11    frovatriptan (FROVA) 2.5 MG tablet Take 1 tablet at onset of acute migraine. May take 1 more tablet 2 hours later if needed. (MAX 2 TABLET PER DAY. MAX 4 TABLETS PER WEEK.) 9 tablet 1    glucagon (BAQSIMI) 3 mg/actuation Spry 1 spray by Nasal route as needed (hypoglycemia). For emergency use. May repeat 1 time after 15 minutes 2 each 1    insulin aspart U-100 (NOVOLOG FLEXPEN U-100 INSULIN) 100 unit/mL (3 mL) InPn pen Inject 10 Units into the skin 3 (three) times daily before meals. Use per sliding scale up to 10 units 3 times a day before meals 15 mL 3    lancets (ONETOUCH DELICA PLUS LANCET) 30 gauge Misc Use as directed 3 times daily 100 each 11    pen needle, diabetic (BD ULTRA-FINE MINI PEN NEEDLE) 31 gauge x 3/16" Ndle Use one needle 5 times a day 200 each 11    pen needle, diabetic (BD ULTRA-FINE MINI PEN NEEDLE) 31 gauge x 3/16" Ndle Use 5 a day 200 each 3    pneumoc 20-tatiana conj-dip cr,PF, (PREVNAR-20, PF,) 0.5 mL Syrg injection Inject 0.5 mLs into the muscle. 0.5 mL 0    promethazine (PHENERGAN) 12.5 MG Tab       secukinumab (COSENTYX PEN, 2 PENS,) 150 mg/mL PnIj Inject 300 mg (2 mL) into the skin every 28 days. 2 mL 6    [DISCONTINUED] clonazePAM (KLONOPIN) 1 MG tablet Take 1 tablet by mouth daily 30 tablet 0    [DISCONTINUED] glucagon, human recombinant, (GLUCAGON EMERGENCY KIT, HUMAN,) 1 mg SolR Inject 1 mg into the muscle as needed. Emergency use 1 each 1    [DISCONTINUED] valsartan (DIOVAN) 320 MG tablet Take 1 tablet (320 mg total) by mouth once daily. 90 tablet 3    [DISCONTINUED] zolpidem (AMBIEN) 10 mg Tab Take 1 tablet (10 mg total) by mouth every evening. 30 tablet 1        PMHx, PSHx, Allergies, Medications reviewed in epic    ROS negative except pain complaints in HPI    OBJECTIVE:    /67 (BP Location: Right arm, Patient Position: Sitting)   Pulse 87   Temp 97.7 °F (36.5 " "°C) (Temporal)   Resp 18   Ht 4' 8" (1.422 m)   Wt 80.7 kg (177 lb 14.6 oz)   SpO2 97%   Breastfeeding No   BMI 39.89 kg/m²     PHYSICAL EXAMINATION:    GENERAL: Well appearing, in no acute distress, alert and oriented x3.  PSYCH:  Mood and affect appropriate.  SKIN: Skin color, texture, turgor normal, no rashes or lesions which will impact the procedure.  CV: RRR with palpation of the radial artery.  PULM: No evidence of respiratory difficulty, symmetric chest rise. Clear to auscultation.  NEURO: Cranial nerves grossly intact.    Plan:    Proceed with procedure as planned Procedure(s) (LRB):  Bilateral C5-7 MBB (diagnostic) (Bilateral)    Otto Phillips MD  12/19/2023            "

## 2023-12-19 NOTE — OP NOTE
CERVICAL Medial Branch Block (DIAGNOSTIC) Under Fluoroscopy  Time-out taken to identify patient and procedure side prior to starting the procedure.        Date of Procedure: 12/19/2023                                                             Patient has clinical and imaging findings suggestive of facet mediated pain.    For supporting clinical documentation, please see most recent clinic and telephone notes.      PROCEDURE:  Bilateral medial branch block at the transverse processes of C5, C6, C7    REASON FOR PROCEDURE:  Cervical spondylosis [M47.812]    PHYSICIAN: Otto Phillips MD    ASSISTANTS: None    MEDICATIONS INJECTED:  0.5% Bupivicaine total 5mL  LOCAL ANESTHETIC USED:   Xylocaine 1% 1mL / site    SEDATION MEDICATIONS: Conscious sedation provided by M.D    The patient was monitored with continuous pulse oximetry, EKG, and intermittent blood pressure monitors, immediately prior to administration of sedation.  The patient was hemodynamically stable throughout the entire process was responsive to voice, and breathing spontaneously.  Supplemental O2 was provided at 2L/min via nasal cannula.  Patient was comfortable for the duration of the procedure.     There was a total of 2mg IV Midazolam and 50mcg Fentanyl titrated for the procedure    Total sedation time was >10minutes and <20minutes      ESTIMATED BLOOD LOSS:  None.    COMPLICATIONS:  None.    TECHNIQUE:   Laying in a prone position, the patient was prepped and draped in the usual sterile fashion using ChloraPrep and fenestrated drape.  The level was determined under fluoroscopic guidance.  Local anesthetic was given by going down to the hub of the 27-gauge 1.25in needle and raising a wheel.  A 22-gauge 3.5inch needle was introduced to the anatomic local of the medial branch at each of the stated above levels using fluoroscopy. Then after negative aspiration, the medication was injected slowly. The patient tolerated the procedure well.       The  patient was monitored after the procedure.  Patient was given post procedure and discharge instructions to follow at home.  We will see the patient back in two weeks or the patient may call to inform of status. The patient was discharged in a stable condition

## 2023-12-19 NOTE — DISCHARGE INSTRUCTIONS

## 2023-12-19 NOTE — TELEPHONE ENCOUNTER
----- Message from Katerina Hernandez sent at 12/19/2023  1:56 PM CST -----  Contact: marj  Patient would like for her dexcom prescription to be sent to pharmacy below. Please call her back at [phone].        [facility]  [address]  [place] [address]  Phone: [phone] Fax: [phone]          Thanks  DD

## 2023-12-19 NOTE — DISCHARGE SUMMARY
Discharge Note  Short Stay      SUMMARY     Admit Date: 12/19/2023    Attending Physician: Otto Phillips MD        Discharge Physician: Otto Phillips MD        Discharge Date: 12/19/2023 12:09 PM    Procedure(s) (LRB):  Bilateral C5-7 MBB (diagnostic) (Bilateral)    Final Diagnosis: Cervical spondylosis [M47.812]    Disposition: Home or self care    Patient Instructions:   Current Discharge Medication List        CONTINUE these medications which have NOT CHANGED    Details   ALPRAZolam (XANAX) 1 MG tablet Take 1 tablet (1 mg total) by mouth 2 (two) times daily as needed for Anxiety.  Qty: 60 tablet, Refills: 1    Comments: Replace other script (fill when time) thanks  Associated Diagnoses: Generalized anxiety disorder      amlodipine-valsartan (EXFORGE)  mg per tablet Take 1 tablet by mouth once daily.  Qty: 90 tablet, Refills: 3    Comments: .  Associated Diagnoses: Essential hypertension      aspirin (ECOTRIN) 81 MG EC tablet Take 1 tablet by mouth daily  Qty: 30 tablet, Refills: 0      atorvastatin (LIPITOR) 20 MG tablet Take 1 tablet (20 mg total) by mouth every evening.  Qty: 90 tablet, Refills: 3    Associated Diagnoses: Dyslipidemia associated with type 2 diabetes mellitus      cyclobenzaprine (FLEXERIL) 10 MG tablet Take 1 tablet (10 mg total) by mouth 3 (three) times daily as needed for Muscle spasms.  Qty: 30 tablet, Refills: 0      DULoxetine (CYMBALTA) 60 MG capsule Take 1 capsule (60 mg total) by mouth 2 (two) times daily.  Qty: 180 capsule, Refills: 3    Associated Diagnoses: Generalized anxiety disorder      ergocalciferol (VITAMIN D2) 50,000 unit Cap Take 1 capsule twice weekly for 3 weeks then weekly  Qty: 12 capsule, Refills: 3    Associated Diagnoses: Vitamin D deficiency      fenofibrate (TRICOR) 145 MG tablet Take 1 tablet (145 mg total) by mouth once daily.  Qty: 90 tablet, Refills: 3    Associated Diagnoses: Hypertriglyceridemia      furosemide (LASIX) 20 MG tablet Take 1 tablet  (20 mg total) by mouth once daily.  Qty: 90 tablet, Refills: 3    Associated Diagnoses: Essential hypertension; Hyperaldosteronism; Hyperkalemia      lamoTRIgine (LAMICTAL) 150 MG Tab Take 1 tablet (150 mg total) by mouth every morning.  Qty: 30 tablet, Refills: 2    Associated Diagnoses: Schizoaffective disorder, bipolar type      levocetirizine (XYZAL) 5 MG tablet Take 1 tablet (5 mg total) by mouth every evening.  Qty: 30 tablet, Refills: 11      linaCLOtide (LINZESS) 290 mcg Cap capsule Take 1 capsule (290 mcg total) by mouth before breakfast.  Qty: 30 capsule, Refills: 5    Associated Diagnoses: Chronic constipation      lurasidone (LATUDA) 80 mg Tab tablet Take 1 tablet (80 mg total) by mouth once daily.  Qty: 30 tablet, Refills: 2    Associated Diagnoses: Schizoaffective disorder, bipolar type      magnesium oxide (MAG-OX) 400 mg (241.3 mg magnesium) tablet Take 1 tablet (400 mg total) by mouth once daily.  Qty: 90 tablet, Refills: 3    Associated Diagnoses: Essential hypertension      metFORMIN (GLUCOPHAGE) 1000 MG tablet Take 1 tablet (1,000 mg total) by mouth 2 (two) times daily with meals.  Qty: 180 tablet, Refills: 1    Associated Diagnoses: Type 2 diabetes mellitus with hyperglycemia, with long-term current use of insulin      metoprolol succinate (TOPROL-XL) 50 MG 24 hr tablet Take 1 tablet (50 mg total) by mouth every evening.  Qty: 30 tablet, Refills: 11    Comments: .  Associated Diagnoses: Essential hypertension      mirtazapine (REMERON) 15 MG tablet Take 1 tablet by mouth at bedtime.  Qty: 30 tablet, Refills: 1      montelukast (SINGULAIR) 10 mg tablet Take 10 mg by mouth.      omeprazole (PRILOSEC) 40 MG capsule Take 1 capsule (40 mg total) by mouth once daily.  Qty: 30 capsule, Refills: 11    Comments: Failed omeprazole 20 mg and Nexium 20 mg.  Associated Diagnoses: Gastroesophageal reflux disease, unspecified whether esophagitis present      prucalopride (MOTEGRITY) 2 mg Tab Take 1 tablet (2  "mg) by mouth once daily.  Qty: 30 tablet, Refills: 11    Comments: Failed Miralax, Lactulose, Linzess, Amitiza, and Trulance.  Associated Diagnoses: Chronic idiopathic constipation      spironolactone (ALDACTONE) 25 MG tablet Take 1 tablet (25 mg total) by mouth once daily.  Qty: 90 tablet, Refills: 3    Comments: .  Associated Diagnoses: Essential hypertension; Hyperaldosteronism      tirzepatide (MOUNJARO) 5 mg/0.5 mL PnIj Inject 5 mg into the skin every 7 days.  Qty: 4 Pen, Refills: 2    Associated Diagnoses: Type 2 diabetes mellitus with hyperglycemia, with long-term current use of insulin      tiZANidine (ZANAFLEX) 4 MG tablet Take 2 tablets by mouth every 6 hours as needed  Qty: 240 tablet, Refills: 0      traMADoL (ULTRAM) 50 mg tablet Take 1 tablet (50 mg total) by mouth every 6 (six) hours as needed for Pain.  Qty: 28 tablet, Refills: 0    Comments: Quantity prescribed more than 7 day supply? No      BD INSULIN SYRINGE ULTRA-FINE 1/2 mL 30 gauge x 1/2" Syrg Refills: 0      !! blood sugar diagnostic (ONETOUCH ULTRA TEST) Strp 1 each by Misc.(Non-Drug; Combo Route) route 4 (four) times daily. [facility] Glucose Test Strips  Qty: 400 strip, Refills: 3    Associated Diagnoses: Uncontrolled type 2 diabetes mellitus with hyperglycemia      !! blood sugar diagnostic (ONETOUCH ULTRA TEST) Strp Check blood glucose 4 times daily as directed and as needed (dispense insurance preferred brand or patient choice)  Qty: 200 each, Refills: 5    Associated Diagnoses: Type 2 diabetes mellitus with other specified complication, with long-term current use of insulin      cycloSPORINE 0.05 % Drop Place 1 drop into both eyes 2 (two) times daily.  Qty: 5.5 mL, Refills: 11    Associated Diagnoses: Dry eye      estradioL (ESTRACE) 0.01 % (0.1 mg/gram) vaginal cream Place 1 g vaginally twice a week.  Qty: 42.5 g, Refills: 2    Associated Diagnoses: Vaginal atrophy      flash glucose scanning reader (FREE[employer]YLE AMY 2 READER) [facility] Use as " "directed to check blood sugar  Qty: 1 each, Refills: 1    Comments: cash price  Associated Diagnoses: Uncontrolled type 2 diabetes mellitus with hyperglycemia      fluticasone propionate (FLONASE) 50 mcg/actuation nasal spray 2 sprays (100 mcg total) by Each Nare route once daily.  Qty: 16 g, Refills: 11    Associated Diagnoses: Seasonal allergic rhinitis due to pollen      frovatriptan (FROVA) 2.5 MG tablet Take 1 tablet at onset of acute migraine. May take 1 more tablet 2 hours later if needed. (MAX 2 TABLET PER DAY. MAX 4 TABLETS PER WEEK.)  Qty: 9 tablet, Refills: 1    Associated Diagnoses: Migraine with aura and without status migrainosus, not intractable      glucagon (BAQSIMI) 3 mg/actuation Spry 1 spray by Nasal route as needed (hypoglycemia). For emergency use. May repeat 1 time after 15 minutes  Qty: 2 each, Refills: 1    Associated Diagnoses: Type 2 diabetes mellitus with hyperglycemia, with long-term current use of insulin      insulin aspart U-100 (NOVOLOG FLEXPEN U-100 INSULIN) 100 unit/mL (3 mL) InPn pen Inject 10 Units into the skin 3 (three) times daily before meals. Use per sliding scale up to 10 units 3 times a day before meals  Qty: 15 mL, Refills: 3    Associated Diagnoses: Type 2 diabetes mellitus with hyperglycemia, with long-term current use of insulin      lancets (ONETOUCH DELICA PLUS LANCET) 30 gauge Misc Use as directed 3 times daily  Qty: 100 each, Refills: 11    Associated Diagnoses: Type 2 diabetes mellitus with hyperglycemia, with long-term current use of insulin      !! pen needle, diabetic (BD ULTRA-FINE MINI PEN NEEDLE) 31 gauge x 3/16" Ndle Use one needle 5 times a day  Qty: 200 each, Refills: 11    Associated Diagnoses: Type 2 diabetes mellitus with hyperglycemia, with long-term current use of insulin      !! pen needle, diabetic (BD ULTRA-FINE MINI PEN NEEDLE) 31 gauge x 3/16" Ndle Use 5 a day  Qty: 200 each, Refills: 3    Associated Diagnoses: Type 2 diabetes, uncontrolled, " with neuropathy      pneumoc 20-tatiana conj-dip cr,PF, (PREVNAR-20, PF,) 0.5 mL Syrg injection Inject 0.5 mLs into the muscle.  Qty: 0.5 mL, Refills: 0      promethazine (PHENERGAN) 12.5 MG Tab       secukinumab (COSENTYX PEN, 2 PENS,) 150 mg/mL PnIj Inject 300 mg (2 mL) into the skin every 28 days.  Qty: 2 mL, Refills: 6    Associated Diagnoses: Spondyloarthropathy       !! - Potential duplicate medications found. Please discuss with provider.              Discharge Diagnosis: Cervical spondylosis [M47.812]  Condition on Discharge: Stable with no complications to procedure   Diet on Discharge: Same as before.  Activity: as per instruction sheet.  Discharge to: Home with a responsible adult.  Follow up: 2-4 weeks       Please call the office at [phone] if you experience any weakness or loss of sensation, fever > 101.5, pain uncontrolled with oral medications, persistent nausea/vomiting/or diarrhea, redness or drainage from the incisions, or any other worrisome concerns. If physician on call was not reached or could not communicate with our office for any reason please go to the nearest emergency department
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
## 2023-12-20 ENCOUNTER — PATIENT MESSAGE (OUTPATIENT)
Dept: OPHTHALMOLOGY | Facility: CLINIC | Age: 53
End: 2023-12-20

## 2023-12-20 ENCOUNTER — OFFICE VISIT (OUTPATIENT)
Dept: OPHTHALMOLOGY | Facility: CLINIC | Age: 53
End: 2023-12-20
Payer: MEDICAID

## 2023-12-20 ENCOUNTER — PATIENT MESSAGE (OUTPATIENT)
Dept: PAIN MEDICINE | Facility: CLINIC | Age: 53
End: 2023-12-20
Payer: MEDICAID

## 2023-12-20 DIAGNOSIS — H25.13 NUCLEAR SCLEROSIS, BILATERAL: ICD-10-CM

## 2023-12-20 DIAGNOSIS — H52.4 MYOPIA WITH PRESBYOPIA, BILATERAL: ICD-10-CM

## 2023-12-20 DIAGNOSIS — E11.36 DIABETIC CATARACT OF BOTH EYES: ICD-10-CM

## 2023-12-20 DIAGNOSIS — E11.9 TYPE 2 DIABETES MELLITUS WITHOUT RETINOPATHY: Primary | ICD-10-CM

## 2023-12-20 DIAGNOSIS — H52.13 MYOPIA WITH PRESBYOPIA, BILATERAL: ICD-10-CM

## 2023-12-20 DIAGNOSIS — H04.129 DRY EYE: ICD-10-CM

## 2023-12-20 PROCEDURE — 4010F PR ACE/ARB THEARPY RXD/TAKEN: ICD-10-PCS | Mod: CPTII,,, | Performed by: OPTOMETRIST

## 2023-12-20 PROCEDURE — 3066F NEPHROPATHY DOC TX: CPT | Mod: CPTII,,, | Performed by: OPTOMETRIST

## 2023-12-20 PROCEDURE — 4010F ACE/ARB THERAPY RXD/TAKEN: CPT | Mod: CPTII,,, | Performed by: OPTOMETRIST

## 2023-12-20 PROCEDURE — 1159F MED LIST DOCD IN RCRD: CPT | Mod: CPTII,,, | Performed by: OPTOMETRIST

## 2023-12-20 PROCEDURE — 3061F PR NEG MICROALBUMINURIA RESULT DOCUMENTED/REVIEW: ICD-10-PCS | Mod: CPTII,,, | Performed by: OPTOMETRIST

## 2023-12-20 PROCEDURE — 1159F PR MEDICATION LIST DOCUMENTED IN MEDICAL RECORD: ICD-10-PCS | Mod: CPTII,,, | Performed by: OPTOMETRIST

## 2023-12-20 PROCEDURE — 1160F PR REVIEW ALL MEDS BY PRESCRIBER/CLIN PHARMACIST DOCUMENTED: ICD-10-PCS | Mod: CPTII,,, | Performed by: OPTOMETRIST

## 2023-12-20 PROCEDURE — 99999 PR PBB SHADOW E&M-EST. PATIENT-LVL II: CPT | Mod: PBBFAC,,, | Performed by: OPTOMETRIST

## 2023-12-20 PROCEDURE — 92015 PR REFRACTION: ICD-10-PCS | Mod: ,,, | Performed by: OPTOMETRIST

## 2023-12-20 PROCEDURE — 3044F HG A1C LEVEL LT 7.0%: CPT | Mod: CPTII,,, | Performed by: OPTOMETRIST

## 2023-12-20 PROCEDURE — 3044F PR MOST RECENT HEMOGLOBIN A1C LEVEL <7.0%: ICD-10-PCS | Mod: CPTII,,, | Performed by: OPTOMETRIST

## 2023-12-20 PROCEDURE — 92014 COMPRE OPH EXAM EST PT 1/>: CPT | Mod: S$PBB,,, | Performed by: OPTOMETRIST

## 2023-12-20 PROCEDURE — 92014 PR EYE EXAM, EST PATIENT,COMPREHESV: ICD-10-PCS | Mod: S$PBB,,, | Performed by: OPTOMETRIST

## 2023-12-20 PROCEDURE — 92015 DETERMINE REFRACTIVE STATE: CPT | Mod: ,,, | Performed by: OPTOMETRIST

## 2023-12-20 PROCEDURE — 3061F NEG MICROALBUMINURIA REV: CPT | Mod: CPTII,,, | Performed by: OPTOMETRIST

## 2023-12-20 PROCEDURE — 2023F DILAT RTA XM W/O RTNOPTHY: CPT | Mod: CPTII,,, | Performed by: OPTOMETRIST

## 2023-12-20 PROCEDURE — 99212 OFFICE O/P EST SF 10 MIN: CPT | Mod: PBBFAC | Performed by: OPTOMETRIST

## 2023-12-20 PROCEDURE — 1160F RVW MEDS BY RX/DR IN RCRD: CPT | Mod: CPTII,,, | Performed by: OPTOMETRIST

## 2023-12-20 PROCEDURE — 99999 PR PBB SHADOW E&M-EST. PATIENT-LVL II: ICD-10-PCS | Mod: PBBFAC,,, | Performed by: OPTOMETRIST

## 2023-12-20 PROCEDURE — 2023F PR DILATED RETINAL EXAM W/O EVID OF RETINOPATHY: ICD-10-PCS | Mod: CPTII,,, | Performed by: OPTOMETRIST

## 2023-12-20 PROCEDURE — 3066F PR DOCUMENTATION OF TREATMENT FOR NEPHROPATHY: ICD-10-PCS | Mod: CPTII,,, | Performed by: OPTOMETRIST

## 2023-12-20 NOTE — PROGRESS NOTES
HPI     Diabetic Eye Exam            Comments: Lab Results       Component                Value               Date                       HGBA1C                   6.5 (H)             11/29/2023                      Comments    Pt c/o of decreased distance vision overall OU.  States she has dry eyes.    Restasis doesn't help.    Restasis bid OU.          Last edited by Milagros Brantley MA on 12/20/2023  8:52 AM.            Assessment /Plan     For exam results, see Encounter Report.    Type 2 diabetes mellitus without retinopathy  There was no diabetic retinopathy present in either eye today.   Recommended that pt continue care with PCP and/or specialists regarding diabetes.  Follow-up dilated eye exam recommended in 12 months, sooner with any vision changes or new concerns.    Nuclear sclerosis, bilateral  Diabetic cataract of both eyes  Cataracts not significantly affecting activities of daily living and therefore surgery is not indicated at this time.   Will continue to monitor over the next 12 months. Pt to call or RTC with any significant change in vision prior to next visit.     Dry eye  -     lifitegrast (XIIDRA) 5 % Dpet; Place 1 drop into both eyes 2 (two) times daily.  Dispense: 60 each; Refill: 12  D/c Restasis   Start Xiidra bid OU   Recommended artificial tears bid OU  Ivizia coupon given to patient     Myopia with presbyopia, bilateral  Hold Spec Rx due to elevated blood glucose     RTC 1 month for Topography, Auto refraction and Dry refraction or PRN if any problems.   Discussed above and answered questions.                        Transposition Flap Text: The defect edges were debeveled with a #15 scalpel blade.  Given the location of the defect and the proximity to free margins a transposition flap was deemed most appropriate.  Using a sterile surgical marker, an appropriate transposition flap was drawn incorporating the defect.    The area thus outlined was incised deep to adipose tissue with a #15 scalpel blade.  The skin margins were undermined to an appropriate distance in all directions utilizing iris scissors.

## 2023-12-22 ENCOUNTER — OFFICE VISIT (OUTPATIENT)
Dept: ORTHOPEDICS | Facility: CLINIC | Age: 53
End: 2023-12-22
Payer: MEDICAID

## 2023-12-22 VITALS — HEIGHT: 56 IN | BODY MASS INDEX: 39.82 KG/M2 | WEIGHT: 177 LBS

## 2023-12-22 DIAGNOSIS — G56.03 BILATERAL CARPAL TUNNEL SYNDROME: Primary | ICD-10-CM

## 2023-12-22 PROCEDURE — 99024 POSTOP FOLLOW-UP VISIT: CPT | Mod: ,,, | Performed by: ORTHOPAEDIC SURGERY

## 2023-12-22 PROCEDURE — 3044F PR MOST RECENT HEMOGLOBIN A1C LEVEL <7.0%: ICD-10-PCS | Mod: CPTII,,, | Performed by: ORTHOPAEDIC SURGERY

## 2023-12-22 PROCEDURE — 3044F HG A1C LEVEL LT 7.0%: CPT | Mod: CPTII,,, | Performed by: ORTHOPAEDIC SURGERY

## 2023-12-22 PROCEDURE — 99214 OFFICE O/P EST MOD 30 MIN: CPT | Mod: PBBFAC | Performed by: ORTHOPAEDIC SURGERY

## 2023-12-22 PROCEDURE — 99024 PR POST-OP FOLLOW-UP VISIT: ICD-10-PCS | Mod: ,,, | Performed by: ORTHOPAEDIC SURGERY

## 2023-12-22 PROCEDURE — 3066F PR DOCUMENTATION OF TREATMENT FOR NEPHROPATHY: ICD-10-PCS | Mod: CPTII,,, | Performed by: ORTHOPAEDIC SURGERY

## 2023-12-22 PROCEDURE — 1159F MED LIST DOCD IN RCRD: CPT | Mod: CPTII,,, | Performed by: ORTHOPAEDIC SURGERY

## 2023-12-22 PROCEDURE — 1160F PR REVIEW ALL MEDS BY PRESCRIBER/CLIN PHARMACIST DOCUMENTED: ICD-10-PCS | Mod: CPTII,,, | Performed by: ORTHOPAEDIC SURGERY

## 2023-12-22 PROCEDURE — 99999 PR PBB SHADOW E&M-EST. PATIENT-LVL IV: ICD-10-PCS | Mod: PBBFAC,,, | Performed by: ORTHOPAEDIC SURGERY

## 2023-12-22 PROCEDURE — 3061F NEG MICROALBUMINURIA REV: CPT | Mod: CPTII,,, | Performed by: ORTHOPAEDIC SURGERY

## 2023-12-22 PROCEDURE — 1160F RVW MEDS BY RX/DR IN RCRD: CPT | Mod: CPTII,,, | Performed by: ORTHOPAEDIC SURGERY

## 2023-12-22 PROCEDURE — 4010F PR ACE/ARB THEARPY RXD/TAKEN: ICD-10-PCS | Mod: CPTII,,, | Performed by: ORTHOPAEDIC SURGERY

## 2023-12-22 PROCEDURE — 1159F PR MEDICATION LIST DOCUMENTED IN MEDICAL RECORD: ICD-10-PCS | Mod: CPTII,,, | Performed by: ORTHOPAEDIC SURGERY

## 2023-12-22 PROCEDURE — 3066F NEPHROPATHY DOC TX: CPT | Mod: CPTII,,, | Performed by: ORTHOPAEDIC SURGERY

## 2023-12-22 PROCEDURE — 99999 PR PBB SHADOW E&M-EST. PATIENT-LVL IV: CPT | Mod: PBBFAC,,, | Performed by: ORTHOPAEDIC SURGERY

## 2023-12-22 PROCEDURE — 3061F PR NEG MICROALBUMINURIA RESULT DOCUMENTED/REVIEW: ICD-10-PCS | Mod: CPTII,,, | Performed by: ORTHOPAEDIC SURGERY

## 2023-12-22 PROCEDURE — 4010F ACE/ARB THERAPY RXD/TAKEN: CPT | Mod: CPTII,,, | Performed by: ORTHOPAEDIC SURGERY

## 2023-12-22 NOTE — PROGRESS NOTES
Subjective:     Patient ID: Yolanda Betts is a 53 y.o. female.    Chief Complaint: Pain and Post-op Evaluation of the Right Hand and Pain and Post-op Evaluation of the Left Hand      HPI:  The patient is a 53-year-old female status post bilateral carpal tunnel release date of surgery is 2023.  She seems to be doing well.    Past Medical History:   Diagnosis Date    Abnormal Pap smear of cervix     HPV genital warts    Anemia     Anxiety     Arthritis     Asthma 10/11/2016    Bipolar 1 disorder     Diabetes mellitus, type 2     Dyslipidemia associated with type 2 diabetes mellitus 2019    General anesthetics causing adverse effect in therapeutic use     Genital warts     GERD (gastroesophageal reflux disease)     Herpes simplex virus (HSV) infection     Hyperlipidemia     Hypertension     Hypertension complicating diabetes 2019    Migraine with aura and without status migrainosus, not intractable 2016    Mild persistent asthma without complication 10/11/2016    Morbid obesity with body mass index (BMI) of 45.0 to 49.9 in adult 2017    Obstructive sleep apnea     ALEN (obstructive sleep apnea)     2L per N/C q HS    Schizoaffective disorder, bipolar type 2019    Seasonal allergic rhinitis due to pollen 2019    Type 2 diabetes mellitus with hyperglycemia, with long-term current use of insulin 2017    Type 2 diabetes mellitus with hyperglycemia, without long-term current use of insulin 2017     Past Surgical History:   Procedure Laterality Date    abcess removed right back      BELT ABDOMINOPLASTY      BREAST SURGERY Bilateral     Reduction    CARPAL TUNNEL RELEASE Bilateral 2023    Procedure: RELEASE, CARPAL TUNNEL;  Surgeon: Neville Roth MD;  Location: [facility];  Service: Orthopedics;  Laterality: Bilateral;  bilateral carpal tunnel release     SECTION      X 2    COLONOSCOPY      COLONOSCOPY N/A 2018    Procedure:  "colonoscopy for iron deficiency anemia;  Surgeon: Frankie Sanchez MD;  Location: [facility];  Service: Endoscopy;  Laterality: N/A;    COLONOSCOPY N/A 2/28/2018    Procedure: COLONOSCOPY;  Surgeon: Frankie Sanchez MD;  Location: [facility];  Service: Endoscopy;  Laterality: N/A;    COLONOSCOPY N/A 3/10/2023    Procedure: COLONOSCOPY;  Surgeon: Lalita Montes De Oca MD;  Location: [facility];  Service: Endoscopy;  Laterality: N/A;    EPIDURAL STEROID INJECTION INTO CERVICAL SPINE N/A 1/31/2023    Procedure: C6/7 IL ROCAEL RN IV Sedation;  Surgeon: Otto Phillips MD;  Location: [facility]GT;  Service: Pain Management;  Laterality: N/A;    ESOPHAGOGASTRODUODENOSCOPY N/A 3/10/2023    Procedure: EGD (ESOPHAGOGASTRODUODENOSCOPY);  Surgeon: Lalita Montes De Oca MD;  Location: [facility];  Service: Endoscopy;  Laterality: N/A;    HYSTERECTOMY      w/ BSO; hypermenorrhea    INJECTION OF ANESTHETIC AGENT AROUND MEDIAL BRANCH NERVES INNERVATING CERVICAL FACET JOINT Bilateral 12/19/2023    Procedure: Bilateral C5-7 MBB (diagnostic);  Surgeon: Otto Phillips MD;  Location: [facility]GT;  Service: Pain Management;  Laterality: Bilateral;    MOUTH SURGERY  1996    OOPHORECTOMY      hyst/bso, hypermenorrhea    ROBOT-ASSISTED CHOLECYSTECTOMY USING DA DARI XI N/A 1/3/2020    Procedure: XI ROBOTIC CHOLECYSTECTOMY;  Surgeon: Damir Driscoll MD;  Location: [facility];  Service: General;  Laterality: N/A;    TOTAL REDUCTION MAMMOPLASTY      TUBAL LIGATION       Family History   Problem Relation Age of Onset    Diabetes Mother     Hyperlipidemia Mother     Hypertension Mother     Asthma Mother     COPD Mother     Glaucoma Mother     Thyroid disease Mother     Anesthesia problems Mother         "almost had a cardiac arrest" , blood clots    Hypertension Father     Hyperlipidemia Father     Cancer Father         Brain, lung, liver, kidney    No Known Problems Sister     Hypertension Brother     Alcohol abuse Brother     Heart " disease Maternal Grandmother     Hyperlipidemia Maternal Grandmother     Hypertension Maternal Grandmother     Cataracts Maternal Grandmother     Diabetes Maternal Grandmother     Heart disease Maternal Grandfather     Hyperlipidemia Maternal Grandfather     Hypertension Maternal Grandfather     Glaucoma Maternal Grandfather     Cancer Maternal Grandfather     Cataracts Maternal Grandfather     Macular degeneration Maternal Grandfather     Diabetes Maternal Grandfather     Heart disease Paternal Grandmother     Hyperlipidemia Paternal Grandmother     Hypertension Paternal Grandmother     Cataracts Paternal Grandmother     Heart disease Paternal Grandfather     Hyperlipidemia Paternal Grandfather     Hypertension Paternal Grandfather     Cataracts Paternal Grandfather     Breast cancer Maternal Cousin     Breast cancer Maternal Cousin     Breast cancer Maternal Cousin     Breast cancer Maternal Cousin      Social History     Socioeconomic History    Marital status: Single   Tobacco Use    Smoking status: Never    Smokeless tobacco: Never   Substance and Sexual Activity    Alcohol use: Yes     Alcohol/week: 0.0 standard drinks of alcohol     Comment: socially  No alcohol 72h prior to sx    Drug use: No    Sexual activity: Yes     Partners: Male     Birth control/protection: Surgical     Comment: hyst   Social History Narrative    Long-term care nurse     Social Determinants of Health     Financial Resource Strain: High Risk (2/1/2023)    Overall Financial Resource Strain (CARDIA)     Difficulty of Paying Living Expenses: Very hard   Food Insecurity: Food Insecurity Present (2/1/2023)    Hunger Vital Sign     Worried About Running Out of Food in the Last Year: Sometimes true     Ran Out of Food in the Last Year: Sometimes true   Transportation Needs: No Transportation Needs (2/1/2023)    PRAPARE - Transportation     Lack of Transportation (Medical): No     Lack of Transportation (Non-Medical): No   Physical Activity:  "Inactive (2/1/2023)    Exercise Vital Sign     Days of Exercise per Week: 0 days     Minutes of Exercise per Session: 0 min   Stress: Stress Concern Present (2/1/2023)    [employer] [place] [employer] - Occupational Stress Questionnaire     Feeling of Stress : Very much   Social Connections: Moderately Isolated (2/1/2023)    Social Connection and Isolation Panel [NHANES]     Frequency of Communication with Friends and Family: More than three times a week     Frequency of Social Gatherings with Friends and Family: More than three times a week     Attends Qu[employer] Services: More than 4 times per year     Active Member of Clubs or Organizations: No     Attends Club or Organization Meetings: Never     Marital Status:    Housing Stability: High Risk (2/1/2023)    Housing Stability Vital Sign     Unable to Pay for Housing in the Last Year: Yes     Number of Places Lived in the Last Year: 1     Unstable Housing in the Last Year: No     Medication List with Changes/Refills   Current Medications    ALPRAZOLAM (XANAX) 1 MG TABLET    Take 1 tablet (1 mg total) by mouth 2 (two) times daily as needed for Anxiety.    AMLODIPINE-VALSARTAN (EXFORGE)  MG PER TABLET    Take 1 tablet by mouth once daily.    ASPIRIN (ECOTRIN) 81 MG EC TABLET    Take 1 tablet by mouth daily    ATORVASTATIN (LIPITOR) 20 MG TABLET    Take 1 tablet (20 mg total) by mouth every evening.    BD INSULIN SYRINGE ULTRA-FINE 1/2 ML 30 GAUGE X 1/2" SYRG        BLOOD SUGAR DIAGNOSTIC (ONETOUCH ULTRA TEST) STRP    1 each by Misc.(Non-Drug; Combo Route) route 4 (four) times daily. [facility] Glucose Test Strips    BLOOD SUGAR DIAGNOSTIC (ONETOUCH ULTRA TEST) STRP    Check blood glucose 4 times daily as directed and as needed (dispense insurance preferred brand or patient choice)    BLOOD-GLUCOSE SENSOR (DEXCOM G7 SENSOR) NHI    Use to check bg. Change every 10 days    CYCLOBENZAPRINE (FLEXERIL) 10 MG TABLET    Take 1 tablet (10 mg total) by " mouth 3 (three) times daily as needed for Muscle spasms.    DULOXETINE (CYMBALTA) 60 MG CAPSULE    Take 1 capsule (60 mg total) by mouth 2 (two) times daily.    ERGOCALCIFEROL (VITAMIN D2) 50,000 UNIT CAP    Take 1 capsule twice weekly for 3 weeks then weekly    ESTRADIOL (ESTRACE) 0.01 % (0.1 MG/GRAM) VAGINAL CREAM    Place 1 g vaginally twice a week.    FENOFIBRATE (TRICOR) 145 MG TABLET    Take 1 tablet (145 mg total) by mouth once daily.    FLASH GLUCOSE SCANNING READER ([employer] AMY 2 READER) [facility]    Use as directed to check blood sugar    FLUTICASONE PROPIONATE (FLONASE) 50 MCG/ACTUATION NASAL SPRAY    2 sprays (100 mcg total) by Each Nare route once daily.    FROVATRIPTAN (FROVA) 2.5 MG TABLET    Take 1 tablet at onset of acute migraine. May take 1 more tablet 2 hours later if needed. (MAX 2 TABLET PER DAY. MAX 4 TABLETS PER WEEK.)    FUROSEMIDE (LASIX) 20 MG TABLET    Take 1 tablet (20 mg total) by mouth once daily.    GLUCAGON (BAQSIMI) 3 MG/ACTUATION SPRY    1 spray by Nasal route as needed (hypoglycemia). For emergency use. May repeat 1 time after 15 minutes    INSULIN ASPART U-100 (NOVOLOG FLEXPEN U-100 INSULIN) 100 UNIT/ML (3 ML) INPN PEN    Inject 10 Units into the skin 3 (three) times daily before meals. Use per sliding scale up to 10 units 3 times a day before meals    LAMOTRIGINE (LAMICTAL) 150 MG TAB    Take 1 tablet (150 mg total) by mouth every morning.    LANCETS (ONETOUCH DELICA PLUS LANCET) 30 GAUGE MISC    Use as directed 3 times daily    LEVOCETIRIZINE (XYZAL) 5 MG TABLET    Take 1 tablet (5 mg total) by mouth every evening.    LIFITEGRAST (XIIDRA) 5 % DPET    Place 1 drop into both eyes 2 (two) times daily.    LINACLOTIDE (LINZESS) 290 MCG CAP CAPSULE    Take 1 capsule (290 mcg total) by mouth before breakfast.    LURASIDONE (LATUDA) 80 MG TAB TABLET    Take 1 tablet (80 mg total) by mouth once daily.    MAGNESIUM OXIDE (MAG-OX) 400 MG (241.3 MG MAGNESIUM) TABLET    Take 1 tablet (400 mg  "total) by mouth once daily.    METFORMIN (GLUCOPHAGE) 1000 MG TABLET    Take 1 tablet (1,000 mg total) by mouth 2 (two) times daily with meals.    METOPROLOL SUCCINATE (TOPROL-XL) 50 MG 24 HR TABLET    Take 1 tablet (50 mg total) by mouth every evening.    MIRTAZAPINE (REMERON) 15 MG TABLET    Take 1 tablet by mouth at bedtime.    MONTELUKAST (SINGULAIR) 10 MG TABLET    Take 10 mg by mouth.    OMEPRAZOLE (PRILOSEC) 40 MG CAPSULE    Take 1 capsule (40 mg total) by mouth once daily.    PEN NEEDLE, DIABETIC (BD ULTRA-FINE MINI PEN NEEDLE) 31 GAUGE X 3/16" NDLE    Use one needle 5 times a day    PEN NEEDLE, DIABETIC (BD ULTRA-FINE MINI PEN NEEDLE) 31 GAUGE X 3/16" NDLE    Use 5 a day    PNEUMOC 20-RAY CONJ-DIP CR,PF, (PREVNAR-20, PF,) 0.5 ML SYRG INJECTION    Inject 0.5 mLs into the muscle.    PROMETHAZINE (PHENERGAN) 12.5 MG TAB        PRUCALOPRIDE (MOTEGRITY) 2 MG TAB    Take 1 tablet (2 mg) by mouth once daily.    SECUKINUMAB (COSENTYX PEN, 2 PENS,) 150 MG/ML PNIJ    Inject 300 mg (2 mL) into the skin every 28 days.    SPIRONOLACTONE (ALDACTONE) 25 MG TABLET    Take 1 tablet (25 mg total) by mouth once daily.    TIRZEPATIDE (MOUNJARO) 5 MG/0.5 ML PNIJ    Inject 5 mg into the skin every 7 days.    TIZANIDINE (ZANAFLEX) 4 MG TABLET    Take 2 tablets by mouth every 6 hours as needed    TRAMADOL (ULTRAM) 50 MG TABLET    Take 1 tablet (50 mg total) by mouth every 6 (six) hours as needed for Pain.     Review of patient's allergies indicates:   Allergen Reactions    Codeine Itching    Hydromorphone Other (See Comments)     Can't wake up for long time if taken  Slow to wake up after surgery after receiving    Aleve [naproxen sodium]      Increases BP    Lyrica [pregabalin] Itching    Motrin [ibuprofen]      Increases BP    Neuromuscular blockers, steroidal Hives     some    Latex, natural rubber Rash    Morphine Rash     itching    Norco [hydrocodone-acetaminophen] Itching, Rash and Hallucinations    Seconal [secobarbital " sodium] Rash     itching    Tylox [oxycodone-acetaminophen] Rash     Review of Systems   Constitutional: Negative for malaise/fatigue.   HENT:  Negative for hearing loss.    Eyes:  Negative for double vision and visual disturbance.   Cardiovascular:  Negative for chest pain.   Respiratory:  Positive for shortness of breath.    Endocrine: Negative for cold intolerance.   Hematologic/Lymphatic: Does not bruise/bleed easily.   Skin:  Negative for poor wound healing and suspicious lesions.   Musculoskeletal:  Positive for myalgias. Negative for gout, joint pain and joint swelling.   Gastrointestinal:  Positive for constipation and diarrhea. Negative for nausea and vomiting.   Genitourinary:  Negative for dysuria.   Neurological:  Positive for focal weakness, headaches, numbness, paresthesias and sensory change.   Psychiatric/Behavioral:  Positive for altered mental status. Negative for depression, memory loss and substance abuse. The patient is nervous/anxious.    Allergic/Immunologic: Negative for persistent infections.       Objective:   Body mass index is 39.68 kg/m².  There were no vitals filed for this visit.             General    Constitutional: She is oriented to person, place, and time. She appears well-developed and well-nourished. No distress.   HENT:   Head: Normocephalic.   Eyes: EOM are normal.   Pulmonary/Chest: Effort normal.   Neurological: She is oriented to person, place, and time.   Psychiatric: She has a normal mood and affect.             Right Hand/Wrist Exam     Inspection   Scars: Wrist - present Hand -  present  Effusion:  Hand -  absent    Other     Neuorologic Exam    Median Distribution: normal  Ulnar Distribution: normal  Radial Distribution: normal    Comments:  There is a well-healed carpal tunnel incision right wrist.  The suture line is intact.  There is no sign of infection.      Left Hand/Wrist Exam     Inspection   Scars: Wrist - present Hand -  present  Effusion: Wrist - absent Hand  -  absent    Other     Sensory Exam  Median Distribution: normal  Ulnar Distribution: normal  Radial Distribution: normal    Comments:  There is a well-healed carpal tunnel incision left wrist.  Suture lines intact.  There is no sign of infection.          Vascular Exam       Capillary Refill  Right Hand: normal capillary refill  Left Hand: normal capillary refill           radiographs were not obtained today  Assessment:     Encounter Diagnosis   Name Primary?    Bilateral carpal tunnel syndrome Yes        Plan:     The patient had wound care discussed.  She was given bilateral carpal tunnel splints.  The wound was redressed.  She will return in 1 week for suture removal.                Disclaimer: This note was prepared using a voice recognition system and is likely to have sound alike errors within the text.

## 2023-12-31 ENCOUNTER — NURSE TRIAGE (OUTPATIENT)
Dept: ADMINISTRATIVE | Facility: CLINIC | Age: 53
End: 2023-12-31
Payer: MEDICAID

## 2023-12-31 NOTE — PROGRESS NOTES
SUBJECTIVE:      Patient ID: Yolanda Betts is a 53 y.o. female.    HPI: Ms. Betts is here today for post-operative visit #2.  She is 15 days status post bilateral carpal tunnel release on 12/18/23. She reports that she is still having aching pain to both wrists, right worse than left.  Pain is 8/10.  She is taking tramadol as directed for pain.  She has been compliant with postop instructions and keeping the extremity dry. She denies fever, chills, and sweats since the time of the surgery.     Interval hx 12/22/23 (Dr. Roth): The patient is a 53-year-old female status post bilateral carpal tunnel release date of surgery is 12/18/2023.  She seems to be doing well.     Past Medical History:   Diagnosis Date    Abnormal Pap smear of cervix     HPV genital warts    Anemia     Anxiety     Arthritis     Asthma 10/11/2016    Bipolar 1 disorder     Diabetes mellitus, type 2     Dyslipidemia associated with type 2 diabetes mellitus 05/13/2019    General anesthetics causing adverse effect in therapeutic use     Genital warts     GERD (gastroesophageal reflux disease)     Herpes simplex virus (HSV) infection     Hyperlipidemia     Hypertension     Hypertension complicating diabetes 05/05/2019    Migraine with aura and without status migrainosus, not intractable 03/21/2016    Mild persistent asthma without complication 10/11/2016    Morbid obesity with body mass index (BMI) of 45.0 to 49.9 in adult 08/03/2017    Obstructive sleep apnea     ALEN (obstructive sleep apnea)     2L per N/C q HS    Schizoaffective disorder, bipolar type 04/25/2019    Seasonal allergic rhinitis due to pollen 05/13/2019    Type 2 diabetes mellitus with hyperglycemia, with long-term current use of insulin 12/21/2017    Type 2 diabetes mellitus with hyperglycemia, without long-term current use of insulin 12/21/2017     Past Surgical History:   Procedure Laterality Date    abcess removed right back      BELT ABDOMINOPLASTY  1996    BREAST  SURGERY Bilateral     Reduction    CARPAL TUNNEL RELEASE Bilateral 2023    Procedure: RELEASE, CARPAL TUNNEL;  Surgeon: Neville Roth MD;  Location: [facility] OR;  Service: Orthopedics;  Laterality: Bilateral;  bilateral carpal tunnel release     SECTION      X 2    COLONOSCOPY      COLONOSCOPY N/A 2018    Procedure: colonoscopy for iron deficiency anemia;  Surgeon: Frankie Sanchez MD;  Location: [facility];  Service: Endoscopy;  Laterality: N/A;    COLONOSCOPY N/A 2018    Procedure: COLONOSCOPY;  Surgeon: Frankie Sanchez MD;  Location: [facility];  Service: Endoscopy;  Laterality: N/A;    COLONOSCOPY N/A 3/10/2023    Procedure: COLONOSCOPY;  Surgeon: Lalita Montes De Oca MD;  Location: [facility];  Service: Endoscopy;  Laterality: N/A;    EPIDURAL STEROID INJECTION INTO CERVICAL SPINE N/A 2023    Procedure: C6/7 IL ROCAEL RN IV Sedation;  Surgeon: Otto Phillips MD;  Location: [facility] PAIN [facility]GT;  Service: Pain Management;  Laterality: N/A;    ESOPHAGOGASTRODUODENOSCOPY N/A 3/10/2023    Procedure: EGD (ESOPHAGOGASTRODUODENOSCOPY);  Surgeon: Lalita Montes De Oca MD;  Location: [facility];  Service: Endoscopy;  Laterality: N/A;    HYSTERECTOMY      w/ BSO; hypermenorrhea    INJECTION OF ANESTHETIC AGENT AROUND MEDIAL BRANCH NERVES INNERVATING CERVICAL FACET JOINT Bilateral 2023    Procedure: Bilateral C5-7 MBB (diagnostic);  Surgeon: Otto Phillips MD;  Location: [facility];  Service: Pain Management;  Laterality: Bilateral;    MOUTH SURGERY      OOPHORECTOMY      hyst/bso, hypermenorrhea    ROBOT-ASSISTED CHOLECYSTECTOMY USING DA DARI XI N/A 1/3/2020    Procedure: XI ROBOTIC CHOLECYSTECTOMY;  Surgeon: Damir Driscoll MD;  Location: [facility];  Service: General;  Laterality: N/A;    TOTAL REDUCTION MAMMOPLASTY      TUBAL LIGATION       Family History   Problem Relation Age of Onset    Diabetes Mother     Hyperlipidemia Mother     Hypertension Mother     Asthma  "Mother     COPD Mother     Glaucoma Mother     Thyroid disease Mother     Anesthesia problems Mother         "almost had a cardiac arrest" , blood clots    Hypertension Father     Hyperlipidemia Father     Cancer Father         Brain, lung, liver, kidney    No Known Problems Sister     Hypertension Brother     Alcohol abuse Brother     Heart disease Maternal Grandmother     Hyperlipidemia Maternal Grandmother     Hypertension Maternal Grandmother     Cataracts Maternal Grandmother     Diabetes Maternal Grandmother     Heart disease Maternal Grandfather     Hyperlipidemia Maternal Grandfather     Hypertension Maternal Grandfather     Glaucoma Maternal Grandfather     Cancer Maternal Grandfather     Cataracts Maternal Grandfather     Macular degeneration Maternal Grandfather     Diabetes Maternal Grandfather     Heart disease Paternal Grandmother     Hyperlipidemia Paternal Grandmother     Hypertension Paternal Grandmother     Cataracts Paternal Grandmother     Heart disease Paternal Grandfather     Hyperlipidemia Paternal Grandfather     Hypertension Paternal Grandfather     Cataracts Paternal Grandfather     Breast cancer Maternal Cousin     Breast cancer Maternal Cousin     Breast cancer Maternal Cousin     Breast cancer Maternal Cousin      Social History     Socioeconomic History    Marital status: Single   Tobacco Use    Smoking status: Never    Smokeless tobacco: Never   Substance and Sexual Activity    Alcohol use: Yes     Alcohol/week: 0.0 standard drinks of alcohol     Comment: socially  No alcohol 72h prior to sx    Drug use: No    Sexual activity: Yes     Partners: Male     Birth control/protection: Surgical     Comment: hyst   Social History Narrative    Long-term care nurse     Social Determinants of Health     Financial Resource Strain: High Risk (2/1/2023)    Overall Financial Resource Strain (CARDIA)     Difficulty of Paying Living Expenses: Very hard   Food Insecurity: Food Insecurity Present " "(2/1/2023)    Hunger Vital Sign     Worried About Running Out of Food in the Last Year: Sometimes true     Ran Out of Food in the Last Year: Sometimes true   Transportation Needs: No Transportation Needs (2/1/2023)    PRAPARE - Transportation     Lack of Transportation (Medical): No     Lack of Transportation (Non-Medical): No   Physical Activity: Inactive (2/1/2023)    Exercise Vital Sign     Days of Exercise per Week: 0 days     Minutes of Exercise per Session: 0 min   Stress: Stress Concern Present (2/1/2023)    [employer] [place] [employer] - Occupational Stress Questionnaire     Feeling of Stress : Very much   Social Connections: Moderately Isolated (2/1/2023)    Social Connection and Isolation Panel [NHANES]     Frequency of Communication with Friends and Family: More than three times a week     Frequency of Social Gatherings with Friends and Family: More than three times a week     Attends Mandaeism Services: More than 4 times per year     Active Member of Clubs or Organizations: No     Attends Club or Organization Meetings: Never     Marital Status:    Housing Stability: High Risk (2/1/2023)    Housing Stability Vital Sign     Unable to Pay for Housing in the Last Year: Yes     Number of Places Lived in the Last Year: 1     Unstable Housing in the Last Year: No     Medication List with Changes/Refills   Current Medications    ALPRAZOLAM (XANAX) 1 MG TABLET    Take 1 tablet (1 mg total) by mouth 2 (two) times daily as needed for Anxiety.    AMLODIPINE-VALSARTAN (EXFORGE)  MG PER TABLET    Take 1 tablet by mouth once daily.    ASPIRIN (ECOTRIN) 81 MG EC TABLET    Take 1 tablet by mouth daily    ATORVASTATIN (LIPITOR) 20 MG TABLET    Take 1 tablet (20 mg total) by mouth every evening.    BD INSULIN SYRINGE ULTRA-FINE 1/2 ML 30 GAUGE X 1/2" SYRG        BLOOD SUGAR DIAGNOSTIC (ONETOUCH ULTRA TEST) STRP    1 each by Misc.(Non-Drug; Combo Route) route 4 (four) times daily. [employer] Glucose " Test Strips    BLOOD SUGAR DIAGNOSTIC (ONETOUCH ULTRA TEST) STRP    Check blood glucose 4 times daily as directed and as needed (dispense insurance preferred brand or patient choice)    BLOOD-GLUCOSE SENSOR (DEXCOM G7 SENSOR) NHI    Use to check bg. Change every 10 days    CYCLOBENZAPRINE (FLEXERIL) 10 MG TABLET    Take 1 tablet (10 mg total) by mouth 3 (three) times daily as needed for Muscle spasms.    DULOXETINE (CYMBALTA) 60 MG CAPSULE    Take 1 capsule (60 mg total) by mouth 2 (two) times daily.    ERGOCALCIFEROL (VITAMIN D2) 50,000 UNIT CAP    Take 1 capsule twice weekly for 3 weeks then weekly    ESTRADIOL (ESTRACE) 0.01 % (0.1 MG/GRAM) VAGINAL CREAM    Place 1 g vaginally twice a week.    FENOFIBRATE (TRICOR) 145 MG TABLET    Take 1 tablet (145 mg total) by mouth once daily.    FLASH GLUCOSE SCANNING READER (Drik AMY 2 READER) MISC    Use as directed to check blood sugar    FLUTICASONE PROPIONATE (FLONASE) 50 MCG/ACTUATION NASAL SPRAY    2 sprays (100 mcg total) by Each Nare route once daily.    FROVATRIPTAN (FROVA) 2.5 MG TABLET    Take 1 tablet at onset of acute migraine. May take 1 more tablet 2 hours later if needed. (MAX 2 TABLET PER DAY. MAX 4 TABLETS PER WEEK.)    FUROSEMIDE (LASIX) 20 MG TABLET    Take 1 tablet (20 mg total) by mouth once daily.    GLUCAGON (BAQSIMI) 3 MG/ACTUATION SPRY    1 spray by Nasal route as needed (hypoglycemia). For emergency use. May repeat 1 time after 15 minutes    INSULIN ASPART U-100 (NOVOLOG FLEXPEN U-100 INSULIN) 100 UNIT/ML (3 ML) INPN PEN    Inject 10 Units into the skin 3 (three) times daily before meals. Use per sliding scale up to 10 units 3 times a day before meals    LAMOTRIGINE (LAMICTAL) 150 MG TAB    Take 1 tablet (150 mg total) by mouth every morning.    LANCETS (ONETOUCH DELICA PLUS LANCET) 30 GAUGE MISC    Use as directed 3 times daily    LEVOCETIRIZINE (XYZAL) 5 MG TABLET    Take 1 tablet (5 mg total) by mouth every evening.    LIFITEGRAST  "(XIIDRA) 5 % DPET    Place 1 drop into both eyes 2 (two) times daily.    LURASIDONE (LATUDA) 80 MG TAB TABLET    Take 1 tablet (80 mg total) by mouth once daily.    MAGNESIUM OXIDE (MAG-OX) 400 MG (241.3 MG MAGNESIUM) TABLET    Take 1 tablet (400 mg total) by mouth once daily.    METFORMIN (GLUCOPHAGE) 1000 MG TABLET    Take 1 tablet (1,000 mg total) by mouth 2 (two) times daily with meals.    METOPROLOL SUCCINATE (TOPROL-XL) 50 MG 24 HR TABLET    Take 1 tablet (50 mg total) by mouth every evening.    MIRTAZAPINE (REMERON) 15 MG TABLET    Take 1 tablet by mouth at bedtime.    MONTELUKAST (SINGULAIR) 10 MG TABLET    Take 10 mg by mouth.    OMEPRAZOLE (PRILOSEC) 40 MG CAPSULE    Take 1 capsule (40 mg total) by mouth once daily.    PEN NEEDLE, DIABETIC (BD ULTRA-FINE MINI PEN NEEDLE) 31 GAUGE X 3/16" NDLE    Use one needle 5 times a day    PEN NEEDLE, DIABETIC (BD ULTRA-FINE MINI PEN NEEDLE) 31 GAUGE X 3/16" NDLE    Use 5 a day    PNEUMOC 20-RAY CONJ-DIP CR,PF, (PREVNAR-20, PF,) 0.5 ML SYRG INJECTION    Inject 0.5 mLs into the muscle.    PROMETHAZINE (PHENERGAN) 12.5 MG TAB        PRUCALOPRIDE (MOTEGRITY) 2 MG TAB    Take 1 tablet (2 mg) by mouth once daily.    SECUKINUMAB (COSENTYX PEN, 2 PENS,) 150 MG/ML PNIJ    Inject 300 mg (2 mL) into the skin every 28 days.    SPIRONOLACTONE (ALDACTONE) 25 MG TABLET    Take 1 tablet (25 mg total) by mouth once daily.    TIRZEPATIDE (MOUNJARO) 5 MG/0.5 ML PNIJ    Inject 5 mg into the skin every 7 days.    TIZANIDINE (ZANAFLEX) 4 MG TABLET    Take 2 tablets by mouth every 6 hours as needed    TRAMADOL (ULTRAM) 50 MG TABLET    Take 1 tablet (50 mg total) by mouth every 6 (six) hours as needed for Pain.     Review of patient's allergies indicates:   Allergen Reactions    Codeine Itching    Hydromorphone Other (See Comments)     Can't wake up for long time if taken  Slow to wake up after surgery after receiving    Aleve [naproxen sodium]      Increases BP    Lyrica [pregabalin] " "Itching    Motrin [ibuprofen]      Increases BP    Neuromuscular blockers, steroidal Hives     some    Latex, natural rubber Rash    Morphine Rash     itching    Norco [hydrocodone-acetaminophen] Itching, Rash and Hallucinations    Seconal [secobarbital sodium] Rash     itching    Tylox [oxycodone-acetaminophen] Rash       OBJECTIVE:     Physical exam:    Vitals:    01/02/24 0755   Weight: 80.3 kg (177 lb 0.5 oz)   Height: 4' 8" (1.422 m)   PainSc:   8   PainLoc: Wrist     Vital signs are stable, patient is afebrile.  Patient is well dressed and well groomed, no acute distress.  Alert and oriented to person, place, and time.    Right UE:  Incision is clean, dry, and intact. 1cm superficial opening at distal part of incision. No bleeding or drainage.  There is no erythema or exudate. There is no sign of any infection.   She is NVI.   Sutures in place.   2+ pulses noted.  Cap refill <2 seconds  Motor intact to hand    Left UE:  Incision is clean, dry, and intact.   There is no erythema or exudate. There is no sign of any infection.   She is NVI.   Sutures in place.   2+ pulses noted.  Cap refill <2 seconds  Motor intact to hand    ASSESSMENT         Encounter Diagnosis   Name Primary?    S/p bilateral carpal tunnel release Yes            15 days status post bilateral carpal tunnel release     PLAN:           Yolanda was seen today for post-op evaluation and post-op evaluation.    Diagnoses and all orders for this visit:    S/p bilateral carpal tunnel release        - PO instruction reviewed and provided to patient  - The incision x2 was cleaned with hydrogen peroxide. Sutures removed with no difficulty. Steri-Strips applied.   - Patient may use braces as needed for symptomatic relief.    - Patient should notify the office of any signs or symptoms of infection including fevers, erythema, purulent drainage, increasing pain.    - Follow up PRN     POST OPERATIVE VISIT INSTRUCTIONS - Visit #2    1. No soaking the incision " for at least 7-10 days. You may get it wet in the shower and use regular soap.    2. To avoid a hard, painful scar, we recommend you use Mederma and/or Silicone Scar patches. You may also use Cocoa Butter, Vitamin E oil, Coconut oil, etc.    You may start this 5-7 days after stitches are removed and when wound is completely closed.    - Mederma scar cream: scar massage over incision 1-2 times per day. You may use topical lotion or Vitamin E oil. Massage an additional few times a day to help the scar become soft and less sensitive.    - Silicone Scar Patches: cut and place over the incision, then massage over the patch to help with scarring and sensitivity. Can be reused up to 10 days if you rinse it clean, let it dry out, then reapply.    Brands: Mepiform Silicone Scar Sheets (recommended), Scar Away, [employer] brand or generic pharmacy brand ([facility], [facility], [facility])    3. Continue range of motion exercises as instructed at todays visit.    4. You may take Tylenol 500mg and/or Ibuprofen 400mg every 4-6 hours with food for pain as tolerated as long as you do not have an allergy or medical condition that prevents you from taking them.    5. Therapy recommended: none at this time - pt will notify office if she wishes to have a referral    6. Immobilization: ct brace as needed    7. Lifting restrictions: start light at about 10lb and increase gradually as tolerated    8. Follow up: adin Herrera PA-C   [facility]

## 2023-12-31 NOTE — TELEPHONE ENCOUNTER
Reason for Disposition   Patient sounds very sick or weak to the triager    Additional Information   Negative: Very weak (e.g., can't stand)   Negative: Unconscious or difficult to awaken   Negative: Acting confused (e.g., disoriented, slurred speech)   Negative: Sounds like a life-threatening emergency to the triager   Negative: [1] Vomiting AND [2] signs of dehydration (e.g., very dry mouth, lightheaded, dark urine)   Negative: [1] Blood glucose > 240 mg/dL (13.3 mmol/L) AND [2] rapid breathing   Negative: Blood glucose > 500 mg/dL (27.8 mmol/L)   Negative: [1] Blood glucose > 240 mg/dL (13.3 mmol/L) AND [2] urine ketones moderate-large (or more than 1+)   Negative: [1] Blood glucose > 240 mg/dL (13.3 mmol/L) AND [2] blood ketones > 1.4 mmol/L   Negative: [1] Blood glucose > 240 mg/dL (13.3 mmol/L) AND [2] vomiting AND [3] unable to check for ketones (in blood or urine)   Negative: [1] New-onset diabetes suspected (e.g., frequent urination, weak, weight loss) AND [2] vomiting or rapid breathing   Negative: Vomiting lasts > 4 hours    Protocols used: Diabetes - High Blood Sugar-A-  pt called re  x 10 days. taking med as prescribed. Carpal Tunnel release surgery on 12/18. Lidocaine injected in neck on 12/19, pt admits to dark urine no matter how much pt drinks, dry mouth. rec ED or option to get in touch with dr on call. Pt asking to speak with dr. Spoke with dr ignacio bryant above. Rec 3L water a day. Increase 14 units per day. Increase 2 units per day until BG under 180 & until in with . Pt notified. Pt states she is on SSI and adding two units. Pt states she woke for bkft at 11am. Pt states took 18 units with bkft. Now AT=297. Spoke with dr ignacio bryant above. MD rec to go to 22 units at lunch and increase SSI by another 4 units with meals and add increments of two units daily until under 180. Call dr larson. Pt notified.

## 2024-01-02 ENCOUNTER — OFFICE VISIT (OUTPATIENT)
Dept: ORTHOPEDICS | Facility: CLINIC | Age: 54
End: 2024-01-02
Payer: MEDICAID

## 2024-01-02 ENCOUNTER — TELEPHONE (OUTPATIENT)
Dept: DIABETES | Facility: CLINIC | Age: 54
End: 2024-01-02
Payer: MEDICAID

## 2024-01-02 VITALS — HEIGHT: 56 IN | BODY MASS INDEX: 39.82 KG/M2 | WEIGHT: 177 LBS

## 2024-01-02 DIAGNOSIS — E11.65 TYPE 2 DIABETES MELLITUS WITH HYPERGLYCEMIA, WITH LONG-TERM CURRENT USE OF INSULIN: Primary | ICD-10-CM

## 2024-01-02 DIAGNOSIS — M79.10 MUSCLE PAIN: Primary | ICD-10-CM

## 2024-01-02 DIAGNOSIS — Z98.890 S/P BILATERAL CARPAL TUNNEL RELEASE: Primary | ICD-10-CM

## 2024-01-02 DIAGNOSIS — Z79.4 TYPE 2 DIABETES MELLITUS WITH HYPERGLYCEMIA, WITH LONG-TERM CURRENT USE OF INSULIN: Primary | ICD-10-CM

## 2024-01-02 PROCEDURE — 99024 POSTOP FOLLOW-UP VISIT: CPT | Mod: ,,,

## 2024-01-02 PROCEDURE — 3072F LOW RISK FOR RETINOPATHY: CPT | Mod: CPTII,,,

## 2024-01-02 PROCEDURE — 1159F MED LIST DOCD IN RCRD: CPT | Mod: CPTII,,,

## 2024-01-02 PROCEDURE — 99214 OFFICE O/P EST MOD 30 MIN: CPT | Mod: PBBFAC

## 2024-01-02 PROCEDURE — 99999 PR PBB SHADOW E&M-EST. PATIENT-LVL IV: CPT | Mod: PBBFAC,,,

## 2024-01-02 RX ORDER — INSULIN GLARGINE 300 U/ML
16 INJECTION, SOLUTION SUBCUTANEOUS NIGHTLY
Qty: 3 PEN | Refills: 1 | Status: SHIPPED | OUTPATIENT
Start: 2024-01-02

## 2024-01-02 NOTE — TELEPHONE ENCOUNTER
Called patient with regards to complaints of high blood sugar for about a week now. Dexcom report uploaded in chart however no recent report available. She reports switching to Dexcom G7.     Reports blood sugar readings as follows over the last 7 days.  Fasting 139-150  AC breakfast 130-150  AC lunch 250-170  AC dinner 300's    PP Breakfast 190-200  PP lunch 250's  PP dinner 190-140    Current DM regimen:  Mounjaro 5 mg weekly  Metformin 1000 mg bid  Novolog 22 units ac lunch and 4 units plus ss ac breakfast and dinner    If blood pre-meal sugar is  take 2 units of Novolog;  If blood pre-meal sugar is 151-200 add +2 units of Novolog;  If blood pre-meal sugar is 201-250 add +4 units of Novolog;  If blood pre-meal sugar is 251-300 add +6 units of Novolog;  If blood pre-meal sugar is 301-350 add  +8 units of Novolog;  If blood pre-meal sugar is 351-400+ add  +10 units of Novolog;    Discussed resuming Toujeo and potentially increasing Mounjaro dose. Toujeo RX sent to local pharmacy.    New DM regimen as follows:  Continue Mounjaro 5 mg weekly  Continue Metformin 1000 mg bid  Resume Novolog per sliding scale  Resume Toujeo 16 units qhs. Increase dose by 2 units every 3 days to keep fasting blood glucose between .     Informed patient to inform us of trends in BG readings in the next 2-3 days. Will also set up Dexcom G7 share data with clinic.

## 2024-01-02 NOTE — TELEPHONE ENCOUNTER
Blood sugars have been running between 170-300's; patient had hand surgery 12/18/23. Started Mounjaro about a week beofre her surgery.     Called over the weekend, spoke with a provider on call, advised her to increase her Novolog to 22 units at noon and up sliding scale to 4 units to get it under control     Blood sugar this morning was 250, Currently her blood sugar is 107 per her Dexcom, she took Novolog 12 units

## 2024-01-02 NOTE — TELEPHONE ENCOUNTER
----- Message from Mayte Pfeiffer sent at 1/2/2024  8:13 AM CST -----  Contact: self [phone]  Patient called in this morning wanting to know if she can get tiZANidine (ZANAFLEX) 4 MG tablet filled. She also states that she is still in pain after her surgery. Please call back [phone] thanks itw [facility]  [id number] [address]  Phone: [phone] Fax: [phone]

## 2024-01-02 NOTE — TELEPHONE ENCOUNTER
----- Message from Estevan Blackwell sent at 1/2/2024  7:54 AM CST -----  Contact: patient  Yolanda Martin Alpesh would like a call back at [phone], in regards to her Blood sugar levels being elevated. She states it is in the 300's.

## 2024-01-03 ENCOUNTER — TELEPHONE (OUTPATIENT)
Dept: DIABETES | Facility: CLINIC | Age: 54
End: 2024-01-03
Payer: MEDICAID

## 2024-01-03 NOTE — TELEPHONE ENCOUNTER
Patient called per provider to assist with Dexcom sharing set up. Unable to successfully do set up over phone, patient scheduled nurse visit for better assistance. Patient voiced understanding of appointment time and date.

## 2024-01-04 ENCOUNTER — CLINICAL SUPPORT (OUTPATIENT)
Dept: DIABETES | Facility: CLINIC | Age: 54
End: 2024-01-04
Payer: MEDICAID

## 2024-01-04 ENCOUNTER — PATIENT MESSAGE (OUTPATIENT)
Dept: ORTHOPEDICS | Facility: CLINIC | Age: 54
End: 2024-01-04
Payer: MEDICAID

## 2024-01-04 DIAGNOSIS — M54.12 CERVICAL RADICULOPATHY: Primary | ICD-10-CM

## 2024-01-04 DIAGNOSIS — E11.65 TYPE 2 DIABETES MELLITUS WITH HYPERGLYCEMIA, WITH LONG-TERM CURRENT USE OF INSULIN: Primary | ICD-10-CM

## 2024-01-04 DIAGNOSIS — Z79.4 TYPE 2 DIABETES MELLITUS WITH HYPERGLYCEMIA, WITH LONG-TERM CURRENT USE OF INSULIN: Primary | ICD-10-CM

## 2024-01-04 RX ORDER — TIZANIDINE 4 MG/1
8 TABLET ORAL EVERY 6 HOURS PRN
Qty: 240 TABLET | Refills: 0 | Status: SHIPPED | OUTPATIENT
Start: 2024-01-04

## 2024-01-04 NOTE — PROGRESS NOTES
Patient came into the clinic to have sharing code added to her phone. Informed patient that we would give her a call at the end of day to make sure her information has updated from July. Patient voiced understanding

## 2024-01-05 ENCOUNTER — DOCUMENTATION ONLY (OUTPATIENT)
Dept: PAIN MEDICINE | Facility: CLINIC | Age: 54
End: 2024-01-05
Payer: MEDICAID

## 2024-01-05 RX ORDER — LEVOCETIRIZINE DIHYDROCHLORIDE 5 MG/1
5 TABLET, FILM COATED ORAL NIGHTLY
Qty: 30 TABLET | Refills: 11 | Status: SHIPPED | OUTPATIENT
Start: 2024-01-05 | End: 2025-01-04

## 2024-01-05 NOTE — PROGRESS NOTES
Orders in for cervical ROCAEL at different level than previously.   Refill Zanaflex (tizanidine).  Follow-up after ROCAEL

## 2024-01-10 ENCOUNTER — TELEPHONE (OUTPATIENT)
Dept: DIABETES | Facility: CLINIC | Age: 54
End: 2024-01-10
Payer: MEDICAID

## 2024-01-10 ENCOUNTER — TELEPHONE (OUTPATIENT)
Dept: PSYCHIATRY | Facility: CLINIC | Age: 54
End: 2024-01-10
Payer: MEDICAID

## 2024-01-10 DIAGNOSIS — E11.65 TYPE 2 DIABETES MELLITUS WITH HYPERGLYCEMIA, WITH LONG-TERM CURRENT USE OF INSULIN: Primary | ICD-10-CM

## 2024-01-10 DIAGNOSIS — Z79.4 TYPE 2 DIABETES MELLITUS WITH HYPERGLYCEMIA, WITH LONG-TERM CURRENT USE OF INSULIN: Primary | ICD-10-CM

## 2024-01-10 RX ORDER — INSULIN ASPART 100 [IU]/ML
24 INJECTION, SOLUTION INTRAVENOUS; SUBCUTANEOUS
Qty: 45 ML | Refills: 1 | Status: SHIPPED | OUTPATIENT
Start: 2024-01-10 | End: 2024-04-09

## 2024-01-10 RX ORDER — PEN NEEDLE, DIABETIC 30 GX3/16"
NEEDLE, DISPOSABLE MISCELLANEOUS
Qty: 200 EACH | Refills: 11 | Status: SHIPPED | OUTPATIENT
Start: 2024-01-10

## 2024-01-10 NOTE — TELEPHONE ENCOUNTER
Pt has been having high sugars 300 for two weeks. She is taking 22 units at lunch of novolog and adding units as she goes and she is back on toujeo 24 units and increase two units every three days as sugar rises

## 2024-01-10 NOTE — TELEPHONE ENCOUNTER
----- Message from Sara Matta sent at 1/10/2024  8:10 AM CST -----  Contact: Yolanda Mathew is calling in regards to her sugar been high for the past 2 weeks.please call back at .[phone]             Thanks  PAMELA

## 2024-01-11 ENCOUNTER — TELEPHONE (OUTPATIENT)
Dept: DIABETES | Facility: CLINIC | Age: 54
End: 2024-01-11
Payer: MEDICAID

## 2024-01-11 ENCOUNTER — LAB VISIT (OUTPATIENT)
Dept: LAB | Facility: HOSPITAL | Age: 54
End: 2024-01-11
Attending: FAMILY MEDICINE
Payer: MEDICAID

## 2024-01-11 ENCOUNTER — OFFICE VISIT (OUTPATIENT)
Dept: RHEUMATOLOGY | Facility: CLINIC | Age: 54
End: 2024-01-11
Payer: MEDICAID

## 2024-01-11 DIAGNOSIS — M79.7 FIBROMYALGIA: ICD-10-CM

## 2024-01-11 DIAGNOSIS — M45.9 ANKYLOSING SPONDYLITIS, UNSPECIFIED SITE OF SPINE: Primary | ICD-10-CM

## 2024-01-11 LAB
ALBUMIN SERPL BCP-MCNC: 4 G/DL (ref 3.5–5.2)
ALP SERPL-CCNC: 59 U/L (ref 55–135)
ALT SERPL W/O P-5'-P-CCNC: 17 U/L (ref 10–44)
ANION GAP SERPL CALC-SCNC: 9 MMOL/L (ref 8–16)
AST SERPL-CCNC: 12 U/L (ref 10–40)
BASOPHILS # BLD AUTO: 0.08 K/UL (ref 0–0.2)
BASOPHILS NFR BLD: 0.9 % (ref 0–1.9)
BILIRUB SERPL-MCNC: 0.2 MG/DL (ref 0.1–1)
BUN SERPL-MCNC: 18 MG/DL (ref 6–20)
CALCIUM SERPL-MCNC: 10.1 MG/DL (ref 8.7–10.5)
CHLORIDE SERPL-SCNC: 103 MMOL/L (ref 95–110)
CO2 SERPL-SCNC: 26 MMOL/L (ref 23–29)
CREAT SERPL-MCNC: 1 MG/DL (ref 0.5–1.4)
CRP SERPL-MCNC: 5.6 MG/L (ref 0–8.2)
DIFFERENTIAL METHOD BLD: ABNORMAL
EOSINOPHIL # BLD AUTO: 0.2 K/UL (ref 0–0.5)
EOSINOPHIL NFR BLD: 2.2 % (ref 0–8)
ERYTHROCYTE [DISTWIDTH] IN BLOOD BY AUTOMATED COUNT: 15 % (ref 11.5–14.5)
ERYTHROCYTE [SEDIMENTATION RATE] IN BLOOD BY PHOTOMETRIC METHOD: 18 MM/HR (ref 0–36)
EST. GFR  (NO RACE VARIABLE): >60 ML/MIN/1.73 M^2
GLUCOSE SERPL-MCNC: 89 MG/DL (ref 70–110)
HCT VFR BLD AUTO: 34.7 % (ref 37–48.5)
HGB BLD-MCNC: 10.8 G/DL (ref 12–16)
IMM GRANULOCYTES # BLD AUTO: 0.04 K/UL (ref 0–0.04)
IMM GRANULOCYTES NFR BLD AUTO: 0.5 % (ref 0–0.5)
LYMPHOCYTES # BLD AUTO: 3.8 K/UL (ref 1–4.8)
LYMPHOCYTES NFR BLD: 43.1 % (ref 18–48)
MCH RBC QN AUTO: 26.3 PG (ref 27–31)
MCHC RBC AUTO-ENTMCNC: 31.1 G/DL (ref 32–36)
MCV RBC AUTO: 85 FL (ref 82–98)
MONOCYTES # BLD AUTO: 0.6 K/UL (ref 0.3–1)
MONOCYTES NFR BLD: 6.6 % (ref 4–15)
NEUTROPHILS # BLD AUTO: 4.1 K/UL (ref 1.8–7.7)
NEUTROPHILS NFR BLD: 46.7 % (ref 38–73)
NRBC BLD-RTO: 0 /100 WBC
PLATELET # BLD AUTO: 499 K/UL (ref 150–450)
PMV BLD AUTO: 8.9 FL (ref 9.2–12.9)
POTASSIUM SERPL-SCNC: 4.7 MMOL/L (ref 3.5–5.1)
PROT SERPL-MCNC: 7.3 G/DL (ref 6–8.4)
RBC # BLD AUTO: 4.1 M/UL (ref 4–5.4)
SODIUM SERPL-SCNC: 138 MMOL/L (ref 136–145)
WBC # BLD AUTO: 8.74 K/UL (ref 3.9–12.7)

## 2024-01-11 PROCEDURE — 36415 COLL VENOUS BLD VENIPUNCTURE: CPT | Performed by: PHYSICIAN ASSISTANT

## 2024-01-11 PROCEDURE — 99214 OFFICE O/P EST MOD 30 MIN: CPT | Mod: 95,,, | Performed by: PHYSICIAN ASSISTANT

## 2024-01-11 PROCEDURE — 85652 RBC SED RATE AUTOMATED: CPT | Performed by: PHYSICIAN ASSISTANT

## 2024-01-11 PROCEDURE — 85025 COMPLETE CBC W/AUTO DIFF WBC: CPT | Performed by: PHYSICIAN ASSISTANT

## 2024-01-11 PROCEDURE — 4010F ACE/ARB THERAPY RXD/TAKEN: CPT | Mod: CPTII,95,, | Performed by: PHYSICIAN ASSISTANT

## 2024-01-11 PROCEDURE — 80053 COMPREHEN METABOLIC PANEL: CPT | Performed by: PHYSICIAN ASSISTANT

## 2024-01-11 PROCEDURE — 1160F RVW MEDS BY RX/DR IN RCRD: CPT | Mod: CPTII,95,, | Performed by: PHYSICIAN ASSISTANT

## 2024-01-11 PROCEDURE — 3072F LOW RISK FOR RETINOPATHY: CPT | Mod: CPTII,95,, | Performed by: PHYSICIAN ASSISTANT

## 2024-01-11 PROCEDURE — 1159F MED LIST DOCD IN RCRD: CPT | Mod: CPTII,95,, | Performed by: PHYSICIAN ASSISTANT

## 2024-01-11 PROCEDURE — 86140 C-REACTIVE PROTEIN: CPT | Performed by: PHYSICIAN ASSISTANT

## 2024-01-11 RX ORDER — SECUKINUMAB 300 MG/2ML
300 INJECTION SUBCUTANEOUS
Qty: 2 ML | Refills: 6 | Status: SHIPPED | OUTPATIENT
Start: 2024-01-11 | End: 2024-01-24 | Stop reason: CLARIF

## 2024-01-11 NOTE — TELEPHONE ENCOUNTER
----- Message from Roselyn York sent at 1/11/2024 12:22 PM CST -----  Contact: self  Pt is asking for an return call in reference to needing an code ,please call back at .[phone] Thx CJ

## 2024-01-11 NOTE — TELEPHONE ENCOUNTER
Called patient to gave her the new dexcom sharing code, Unable to reach the patient she picks up but I couldn't hear her say anything

## 2024-01-11 NOTE — PROGRESS NOTES
The patient location is: [facility] [place]   The chief complaint leading to consultation is: f.u AS and FM    Visit type: audiovisual    Face to Face time with patient: 15 min  30 minutes of total time spent on the encounter, which includes face to face time and non-face to face time preparing to see the patient (eg, review of tests), Obtaining and/or reviewing separately obtained history, Documenting clinical information in the electronic or other health record, Independently interpreting results (not separately reported) and communicating results to the patient/family/caregiver, or Care coordination (not separately reported).     Each patient to whom he or she provides medical services by telemedicine is:  (1) informed of the relationship between the physician and patient and the respective role of any other health care provider with respect to management of the patient; and (2) notified that he or she may decline to receive medical services by telemedicine and may withdraw from such care at any time.    Subjective:      Patient ID: Yolanda Betts is a 53 y.o. female.    Chief Complaint: No chief complaint on file.    HPI   Yolanda Betts  is a 53 y.o. female seen today for follow-up on ankylosing spondylitis and fibromyalgia.  For FM, she is on Cymbalta 60 mg bid and tizanidine as below.  Psychiatry had briefly reduced Cymbalta to 30 mg in the morning and 60 mg at night.  FM sx worsened, so she resumed 60 mg bid.  Both help fibromyalgia symptoms.  She is still complaining of widespread diffuse pain.  She has failed gabapentin and Lyrica both in the past.  She has also failed Savella.    Ankylosing spondylitis diagnosis was made with inflammatory back pain symptoms, MRI suggestive of sclerosis bilateral SI joints, as well as improvement in symptoms while on a steroid trial.  She failed Humira due to injection site reaction.  Failed Enbrel due to severe dizziness.  She has switched to Cosentyx 150 mg loading.   Increased her to 300 mg SQ 10/2023.  Reports 35% improvement in back sxs.  Also reports improved am stiffness, though it still lasts a cpl hours (improved from most of the day).    Initially had complaints of bone pain.  I had advised her to touch base with the Cosentyx team.  Bone pain is not a known complication from Cosentyx.  They verified that as well.  She states she was having pain in the left femur.  That has since resolved.  In past she has also failed oral anti-inflammatories in the form of ibuprofen as well as Mobic.    Has not been able to work since January 10, 2023.  She has exhausted short term disability through her work insurance.  She is applying for disability through the social security office.    Recently underwent CTR b/l by Dr. Roth.  Incisions have healed.      Patient denies fevers, chills, photosensitivity, eye pain, shortness of breath, chest pain, hematuria, blood in the stool, rash, sicca symptoms, raynauds, finger ulcerations.  Rheumatologic systems otherwise negative.    Serologies/Labs:  Neg RF, CCP, ISAAC  Neg HLA B27  Previously elevated ESR/CRP   Current Treatment:  Tizanidine 4 mg tid  Cymbalta 60 mg bid  Previous Treatment:   Flexeril  Lyrica  Gabapentin  Savella  Low dose Naltrexone - no relief          Past Medical History:   Diagnosis Date    Abnormal Pap smear of cervix     HPV genital warts    Anemia     Anxiety     Arthritis     Asthma 10/11/2016    Bipolar 1 disorder     Diabetes mellitus, type 2     Dyslipidemia associated with type 2 diabetes mellitus 05/13/2019    General anesthetics causing adverse effect in therapeutic use     Genital warts     GERD (gastroesophageal reflux disease)     Herpes simplex virus (HSV) infection     Hyperlipidemia     Hypertension     Hypertension complicating diabetes 05/05/2019    Migraine with aura and without status migrainosus, not intractable 03/21/2016    Mild persistent asthma without complication 10/11/2016    Morbid obesity with  "body mass index (BMI) of 45.0 to 49.9 in adult 08/03/2017    Obstructive sleep apnea     ALEN (obstructive sleep apnea)     2L per N/C q HS    Schizoaffective disorder, bipolar type 04/25/2019    Seasonal allergic rhinitis due to pollen 05/13/2019    Type 2 diabetes mellitus with hyperglycemia, with long-term current use of insulin 12/21/2017    Type 2 diabetes mellitus with hyperglycemia, without long-term current use of insulin 12/21/2017     Family History   Problem Relation Age of Onset    Diabetes Mother     Hyperlipidemia Mother     Hypertension Mother     Asthma Mother     COPD Mother     Glaucoma Mother     Thyroid disease Mother     Anesthesia problems Mother         "almost had a cardiac arrest" , blood clots    Hypertension Father     Hyperlipidemia Father     Cancer Father         Brain, lung, liver, kidney    No Known Problems Sister     Hypertension Brother     Alcohol abuse Brother     Heart disease Maternal Grandmother     Hyperlipidemia Maternal Grandmother     Hypertension Maternal Grandmother     Cataracts Maternal Grandmother     Diabetes Maternal Grandmother     Heart disease Maternal Grandfather     Hyperlipidemia Maternal Grandfather     Hypertension Maternal Grandfather     Glaucoma Maternal Grandfather     Cancer Maternal Grandfather     Cataracts Maternal Grandfather     Macular degeneration Maternal Grandfather     Diabetes Maternal Grandfather     Heart disease Paternal Grandmother     Hyperlipidemia Paternal Grandmother     Hypertension Paternal Grandmother     Cataracts Paternal Grandmother     Heart disease Paternal Grandfather     Hyperlipidemia Paternal Grandfather     Hypertension Paternal Grandfather     Cataracts Paternal Grandfather     Breast cancer Maternal Cousin     Breast cancer Maternal Cousin     Breast cancer Maternal Cousin     Breast cancer Maternal Cousin      Social History     Socioeconomic History    Marital status: Single   Tobacco Use    Smoking status: Never    " Smokeless tobacco: Never   Substance and Sexual Activity    Alcohol use: Yes     Alcohol/week: 0.0 standard drinks of alcohol     Comment: socially  No alcohol 72h prior to sx    Drug use: No    Sexual activity: Yes     Partners: Male     Birth control/protection: Surgical     Comment: hyst   Social History Narrative    Long-term care nurse     Social Determinants of Health     Financial Resource Strain: High Risk (2/1/2023)    Overall Financial Resource Strain (CARDIA)     Difficulty of Paying Living Expenses: Very hard   Food Insecurity: Food Insecurity Present (2/1/2023)    Hunger Vital Sign     Worried About Running Out of Food in the Last Year: Sometimes true     Ran Out of Food in the Last Year: Sometimes true   Transportation Needs: No Transportation Needs (2/1/2023)    PRAPARE - Transportation     Lack of Transportation (Medical): No     Lack of Transportation (Non-Medical): No   Physical Activity: Inactive (2/1/2023)    Exercise Vital Sign     Days of Exercise per Week: 0 days     Minutes of Exercise per Session: 0 min   Stress: Stress Concern Present (2/1/2023)    [employer] [place] [employer] - Occupational Stress Questionnaire     Feeling of Stress : Very much   Social Connections: Moderately Isolated (2/1/2023)    Social Connection and Isolation Panel [NHANES]     Frequency of Communication with Friends and Family: More than three times a week     Frequency of Social Gatherings with Friends and Family: More than three times a week     Attends Taoism Services: More than 4 times per year     Active Member of Clubs or Organizations: No     Attends Club or Organization Meetings: Never     Marital Status:    Housing Stability: High Risk (2/1/2023)    Housing Stability Vital Sign     Unable to Pay for Housing in the Last Year: Yes     Number of Places Lived in the Last Year: 1     Unstable Housing in the Last Year: No     Review of patient's allergies indicates:   Allergen Reactions     Codeine Itching    Hydromorphone Other (See Comments)     Can't wake up for long time if taken  Slow to wake up after surgery after receiving    Aleve [naproxen sodium]      Increases BP    Lyrica [pregabalin] Itching    Motrin [ibuprofen]      Increases BP    Neuromuscular blockers, steroidal Hives     some    Latex, natural rubber Rash    Morphine Rash     itching    Norco [hydrocodone-acetaminophen] Itching, Rash and Hallucinations    Seconal [secobarbital sodium] Rash     itching    Tylox [oxycodone-acetaminophen] Rash       Objective:   There were no vitals taken for this visit.  Immunization History   Administered Date(s) Administered    COVID-19, MRNA, LN-S, PF (MODERNA FULL 0.5 ML DOSE) 03/30/2021, 04/26/2021    Influenza 10/01/2018    Influenza - Quadrivalent - PF *Preferred* (6 months and older) 12/21/2017, 09/24/2020, 02/16/2023, 10/09/2023    Pneumococcal Conjugate - 20 Valent 01/17/2023    Pneumococcal Polysaccharide - 23 Valent 01/31/2017    Tdap 12/21/2017    Zoster Recombinant 01/17/2023, 06/27/2023       Physical Exam   Constitutional: She is oriented to person, place, and time. No distress.   HENT:   Head: Normocephalic and atraumatic.   Pulmonary/Chest: Effort normal.   Musculoskeletal:      Cervical back: Normal range of motion.   Neurological: She is alert and oriented to person, place, and time.   Psychiatric: Mood normal.       Recent Results (from the past 672 hour(s))   POCT glucose    Collection Time: 12/18/23  7:45 AM   Result Value Ref Range    POCT Glucose 103 70 - 110 mg/dL   POCT glucose    Collection Time: 12/18/23 11:07 AM   Result Value Ref Range    POCT Glucose 111 (H) 70 - 110 mg/dL   POCT glucose    Collection Time: 12/19/23 10:23 AM   Result Value Ref Range    POCT Glucose 102 70 - 110 mg/dL       Lab Results   Component Value Date    TBGOLDPLUS Negative 06/27/2023      Lab Results   Component Value Date    HEPAIGM Non-reactive 06/27/2023    HEPBIGM Non-reactive 06/27/2023     HEPCAB Non-reactive 06/27/2023       Assessment:     No diagnosis found.     Plan:     There are no diagnoses linked to this encounter.  Ankylosising Spondylitis  Criteria  MRI evidence of sclerosis SI joints  Elevated CRP in past  Inflammatory back pain  Has failed NSAIDs, humira, enbrel  Pt due for updated labs   Labs scheduled for today  C/w Cosentyx 300 mg q 4 weeks  35% improvement in back sxs  Improved am stiffness to a couple hours (previously lasting all day)  Tolerating well  Send in new rx for 300 mg pen  Pain and infflammation hand MCPs b/l on previous visit.  improved  Hand xrays without erosive changes  Potentially peripheral manifestations of ankylosing spondylitis  May consider addition of MTX  Severe FM affecting ADLs and ability to work  Has failed Lyrica, gabapentin, Savella, low-dose naltrexone  C/w Cymbalta 60 mg b.i.d.  No evidence CKD.  BUN, creatinine, GFR all within normal limits  Continue to monitor renal function  Out of work since 1/10/23  Exhausted Short Term disability at work after 6 weeks  Would like to apply for long-term disability through her job  We will back date long term disability from January 2023 through January 15, 2024  Goal would be for patient to get well managed on Cosentyx and Cymbalta and be able to return to work even if with some restriction  Patient currently applying for disability through the social security office as well  Drug therapy requiring intensive monitoring for toxicity  High Risk Medication Monitoring encounter  No current medication related issues, no evidence of toxicity  I ordered labs for toxicity monitoring, have personally reviewed the findings, and discussed them with the patient.  Pending labs will be sent via the portal  Compromised immune system secondary to autoimmune disease and/or use of immunosuppressive drugs.  Monitor carefully for infections.  Advised patient to get immediate medical care if any infection arises.  Also advised strict  adherence age-appropriate vaccinations and cancer screenings with PCP.  Patient advised to hold DMARD and/or biologic therapy for signs of infection or for surgery. If you are unsure what to do please call our office for instruction.[facility] [phone]  Return to clinic: 3-4 mos w reg 4 prior - MD f/u    No follow-ups on file.    The patient understands, chooses and consents to this plan and accepts all the risks which include but are not limited to the risks mentioned above.     Disclaimer: This note was prepared using a voice recognition system and is likely to have sound alike errors within the text.

## 2024-01-12 DIAGNOSIS — K59.09 CHRONIC CONSTIPATION: ICD-10-CM

## 2024-01-12 NOTE — TELEPHONE ENCOUNTER
No care due was identified.  [facility] Embedded Care Due Messages. Reference number: [id number].   1/12/2024 9:05:24 AM CST

## 2024-01-13 ENCOUNTER — NURSE TRIAGE (OUTPATIENT)
Dept: ADMINISTRATIVE | Facility: CLINIC | Age: 54
End: 2024-01-13
Payer: MEDICAID

## 2024-01-13 NOTE — TELEPHONE ENCOUNTER
"Spoke with patient who states she is a type 2 diabetic.  Patient states she is feeling shaky and dehydrated.  Current blood glucose is reading 88.  Patient has vomited x 4 within the last 10 minutes.  Patient takes metformin 1000 mg BID, (dose taken today), novolog 4 units (today), Toujeo (nighttime), and Monjaro (unknown dosage).  Advised ED per protocol.  Patient declined stating she wants to speak with Dr. Sorenson.  Patient was connected with Dr. Mejia (on call provider) to discuss symptoms.  No further assistance needed from nurse triage line.     Reason for Disposition   [1] Vomiting AND [2] signs of dehydration (e.g., very dry mouth, lightheaded, dark urine)     Patient stated she is dehydrated.  Patient has vomited 4 times.   High-risk adult (e.g., diabetes mellitus, brain tumor, V-P shunt, hernia)     Diabetes Mellitus    Additional Information   Negative: Unconscious or difficult to awaken   Negative: Seizure occurs   Negative: Acting confused (e.g., disoriented, slurred speech)   Negative: Very weak (e.g., can't stand)   Negative: Sounds like a life-threatening emergency to the triager   Negative: Difficult to awaken or acting confused (e.g., disoriented, slurred speech)   Negative: Shock suspected (e.g., cold/pale/clammy skin, too weak to stand, low BP, rapid pulse)   Negative: Sounds like a life-threatening emergency to the triager   Negative: [1] Vomiting AND [2] contains red blood or black ("coffee ground") material  (Exception: Few red streaks in vomit that only happened once.)   Negative: Severe pain in one eye   Negative: Recent head injury (within last 3 days)   Negative: Recent abdominal injury (within last 3 days)   Negative: [1] Insulin-dependent diabetes (Type I) AND [2] glucose > 400 mg/dl (22 mmol/l)   Negative: [1] Vomiting AND [2] hernia is more painful or swollen than usual   Negative: [1] SEVERE vomiting (e.g., 6 or more times/day) AND [2] present > 8 hours (Exception: Patient sounds " well, is drinking liquids, does not sound dehydrated, and vomiting has lasted less than 24 hours.)   Negative: [1] MODERATE vomiting (e.g., 3 - 5 times/day) AND [2] age > 60 years   Negative: Severe headache (e.g., excruciating)  (Exception: Similar to previous migraines.)    Protocols used: Diabetes - Low Blood Sugar-A-AH, Vomiting-A-AH

## 2024-01-13 NOTE — TELEPHONE ENCOUNTER
Reason for Disposition   Caller has already spoken with the PCP and has no further questions.    Protocols used: No Contact or Duplicate Contact Call-A-  pt states she already spoke with  and denies further questions.

## 2024-01-14 ENCOUNTER — NURSE TRIAGE (OUTPATIENT)
Dept: ADMINISTRATIVE | Facility: CLINIC | Age: 54
End: 2024-01-14
Payer: MEDICAID

## 2024-01-14 NOTE — TELEPHONE ENCOUNTER
Pt c/o of elevated blood sugar with nausea, headache, and dizziness and weakness. Pt had similar symptoms yesterday and spoke with on-call provider who instructed her to stay hydrated, and stop insulin, and call if any worse. Pt reports continued symptoms today and CBG in the 300s. Current blood sugar is 140 per Dexcom. Denies vomiting today. Headache is 8/10 and patient has not tried any medications to relieve pain.     Dispo is to see PCP within 24 hours. PCP office will be closed so referred patient to nearby . OD VV offered and accepted. Care advice provided. Pt verbalized understanding.     Reason for Disposition   Unexplained nausea   [1] New headache AND [2] weak immune system (e.g., HIV positive, cancer chemo, splenectomy, organ transplant, chronic steroids)    Additional Information   Negative: Shock suspected (e.g., cold/pale/clammy skin, too weak to stand, low BP, rapid pulse)   Negative: Sounds like a life-threatening emergency to the triager   Negative: Unable to walk, or can only walk with assistance (e.g., requires support)   Negative: Difficulty breathing   Negative: [1] Insulin-dependent diabetes (Type I) AND [2] glucose > 400 mg/dl (22 mmol/l)   Negative: [1] Drinking very little AND [2] dehydration suspected (e.g., no urine > 12 hours, very dry mouth, very lightheaded)   Negative: Patient sounds very sick or weak to the triager   Negative: Fever > 104 F (40 C)   Negative: [1] Fever > 101 F (38.3 C) AND [2] age > 60 years   Negative: [1] Fever > 100.0 F (37.8 C) AND [2] bedridden (e.g., CVA, chronic illness, recovering from surgery)   Negative: [1] Fever > 100.0 F (37.8 C) AND [2] diabetes mellitus or weak immune system (e.g., HIV positive, cancer chemo, splenectomy, organ transplant, chronic steroids)   Negative: Taking any of the following medications: digoxin (Lanoxin), lithium, theophylline, phenytoin (Dilantin)   Negative: Yellowish color of the skin or white of the eye (i.e., jaundice)    "Negative: Fever present > 3 days (72 hours)   Negative: Receiving cancer chemotherapy medication   Negative: Taking prescription medication that could cause nausea (e.g., narcotics/opiates, antibiotics, OCPs, many others)   Negative: Nausea lasts > 1 week   Negative: Substance use (drug use) or unhealthy alcohol use, known or suspected   Negative: Nausea is a chronic symptom (recurrent or ongoing AND present > 4 weeks)   Negative: Difficult to awaken or acting confused (e.g., disoriented, slurred speech)   Negative: [1] Weakness of the face, arm or leg on one side of the body AND [2] new-onset   Negative: [1] Numbness of the face, arm or leg on one side of the body AND [2] new-onset   Negative: [1] Loss of speech or garbled speech AND [2] new-onset   Negative: Passed out (i.e., lost consciousness, collapsed and was not responding)   Negative: Sounds like a life-threatening emergency to the triager   Negative: Unable to walk, or can only walk with assistance (e.g., requires support)   Negative: Stiff neck (can't touch chin to chest)   Negative: Severe pain in one eye   Negative: [1] Other family members (or people in same household) with headaches AND [2] possibility of carbon monoxide exposure   Negative: [1] SEVERE headache (e.g., excruciating) AND [2] "worst headache" of life   Negative: [1] SEVERE headache AND [2] sudden-onset (i.e., reaching maximum intensity within seconds to 1 hour)   Negative: [1] SEVERE headache AND [2] fever   Negative: Loss of vision or double vision  (Exception: Same as prior migraines.)   Negative: [1] Fever > 100.0 F (37.8 C) AND [2] diabetes mellitus or weak immune system (e.g., HIV positive, cancer chemo, splenectomy, organ transplant, chronic steroids)   Negative: Patient sounds very sick or weak to the triager   Negative: [1] SEVERE headache (e.g., excruciating) AND [2] not improved after 2 hours of pain medicine   Negative: [1] Vomiting AND [2] 2 or more times  (Exception: Similar " to previous migraines.)   Negative: Fever > 104 F (40 C)   Negative: [1] MODERATE headache (e.g., interferes with normal activities) AND [2] present > 24 hours AND [3] unexplained  (Exceptions: analgesics not tried, typical migraine, or headache part of viral illness)    Protocols used: Nausea-A-AH, Headache-A-AH

## 2024-01-14 NOTE — TELEPHONE ENCOUNTER
Pt calls, spoke with on-call triage nurse earlier and was unable to connect with OD VV provider. Pt reports that blood sugar is now 69. Pt currently drinking gatorade. Continues to complain of dizziness, nausea, and headache. Dexcom reading 71 after gatorade consumption. Denies any treatment with medication for blood sugar and ate soup this morning.     Call to Rena Mejia DO, on-call provider who also spoke with patient yesterday, recommends patient go to UC or ED for evaluation based on continuation of symptoms despite following recommendations from yesterday. Provided patient with recommendations and care advice. Pt verbalized understanding.     Reason for Disposition   [1] Blood glucose 70  mg/dL (3.9 mmol/L) or below OR symptomatic, now improved with Care Advice AND [2] cause unknown    Additional Information   Negative: Unconscious or difficult to awaken   Negative: Seizure occurs   Negative: Acting confused (e.g., disoriented, slurred speech)   Negative: Very weak (e.g., can't stand)   Negative: Sounds like a life-threatening emergency to the triager   Negative: [1] Vomiting AND [2] signs of dehydration (e.g., very dry mouth, lightheaded, dark urine)   Negative: [1] Low blood sugar symptoms persist > 30 minutes AND [2] using low blood sugar Care Advice   Negative: [1] Low blood glucose (70 mg/dl [3.9 mmol/l] or below) persists > 30 minutes AND [2] using low blood sugar Care Advice   Negative: Patient sounds very sick or weak to the triager   Negative: [1] Low blood sugar symptoms with no other adult present AND [2] hasn't tried Care Advice   Negative: [1] Low blood glucose (70 mg/dl [3.9 mmol/l] or below)) with no other adult present AND [2] hasn't tried Care Advice   Negative: Diabetes drug error or overdose (e.g., insulin error or extra dose)   Negative: [1] Caller has URGENT medication or insulin pump question AND [2] triager unable to answer question    Protocols used: Diabetes - Low Blood  Sugar-A-AH

## 2024-01-16 ENCOUNTER — TELEPHONE (OUTPATIENT)
Dept: DIABETES | Facility: CLINIC | Age: 54
End: 2024-01-16
Payer: MEDICAID

## 2024-01-16 ENCOUNTER — PATIENT OUTREACH (OUTPATIENT)
Dept: DIABETES | Facility: CLINIC | Age: 54
End: 2024-01-16
Payer: MEDICAID

## 2024-01-16 ENCOUNTER — TELEPHONE (OUTPATIENT)
Dept: CARDIOLOGY | Facility: CLINIC | Age: 54
End: 2024-01-16
Payer: MEDICAID

## 2024-01-16 DIAGNOSIS — E11.65 TYPE 2 DIABETES MELLITUS WITH HYPERGLYCEMIA, WITH LONG-TERM CURRENT USE OF INSULIN: Primary | ICD-10-CM

## 2024-01-16 DIAGNOSIS — Z79.4 TYPE 2 DIABETES MELLITUS WITH HYPERGLYCEMIA, WITH LONG-TERM CURRENT USE OF INSULIN: Primary | ICD-10-CM

## 2024-01-16 DIAGNOSIS — N95.2 VAGINAL ATROPHY: ICD-10-CM

## 2024-01-16 PROCEDURE — 95251 CONT GLUC MNTR ANALYSIS I&R: CPT | Mod: ,,, | Performed by: NURSE PRACTITIONER

## 2024-01-16 NOTE — TELEPHONE ENCOUNTER
Spoke with patient and she states that her sugars have been running high. She states that yesterday her sugar was 369 and today it 160

## 2024-01-16 NOTE — TELEPHONE ENCOUNTER
----- Message from Usha Jewell sent at 1/16/2024 10:39 AM CST -----  .Type:  Patient Returning Call    Who Called:.Yolanda Betts   Who Left Message for Patient:  Does the patient know what this is regarding? High blood sugar  Would the patient rather a call back or a response via [employer]? Call back  Best Call Back Number .[phone]   Additional Information:

## 2024-01-16 NOTE — TELEPHONE ENCOUNTER
Spoke with pt in regards to rescheduling appt.           ----- Message from Usha Jewell sent at 1/16/2024 10:43 AM CST -----  Patient would like to reschedule the appt she had today. Call back number is [phone]. Thx. EL

## 2024-01-16 NOTE — TELEPHONE ENCOUNTER
Called pt to review medication list and ss below and review BG. Left a message to call back.                 Diabetes Medications               glucagon (BAQSIMI) 3 mg/actuation Spry 1 spray by Nasal route as needed (hypoglycemia). For emergency use. May repeat 1 time after 15 minutes    insulin aspart U-100 (NOVOLOG FLEXPEN U-100 INSULIN) 100 unit/mL (3 mL) InPn pen Inject 24 Units into the skin 3 (three) times daily before meals. Use per sliding scale for breakfast and dinner  Taking 24 units for lunch , using ss for bk and dinner    insulin glargine, TOUJEO, (TOUJEO SOLOSTAR U-300 INSULIN) 300 unit/mL (1.5 mL) InPn pen Inject 16 Units into the skin every evening.    metFORMIN (GLUCOPHAGE) 1000 MG tablet Take 1 tablet (1,000 mg total) by mouth 2 (two) times daily with meals.    tirzepatide 7.5 mg/0.5 mL PnIj Inject 7.5 mg into the skin every 7 days.           4 units plus (bk and dinner)  If blood pre-meal sugar is 100-150 take +2 units of Novolog;  If blood pre-meal sugar is 151-200 take +3 units of Novolog;  If blood pre-meal sugar is 201-250 take +4 units of Novolog;  If blood pre-meal sugar is 251-300 take +5 units of Novolog;  If blood pre-meal sugar is 301-350 take +6 units of Novolog;  If blood pre-meal sugar is 351-400+ take +7 units of Novolog

## 2024-01-16 NOTE — TELEPHONE ENCOUNTER
Called the patient to schedule an appointment to discuss concerns with the provider. Patient scheduled.

## 2024-01-16 NOTE — PROGRESS NOTES
Covering for [facility].    Called patient with regards to complaints of high blood sugar.   Dexcom report uploaded in chart.  Report generated as follows.    Patient's CGM report interpretation:14 day report  of CGM includes Avg  mg/dl. The target range for this patient was  mg/dL. Time in range was 85%, time above range was 14%, and time below ranges was <2%.   Overall, there was a pattern of euglycemia with postprandial hyperglycemia.      Current DM regimen:  Mounjaro 7.5 mg weekly  Metformin 1000 mg bid  Toujeo 28 units qhs  Novolog 22 units ac lunch and 2 units plus ss ac breakfast and dinner    If blood pre-meal sugar is 100-150 take +2 units of Novolog;  If blood pre-meal sugar is 151-200 take +3 units of Novolog;  If blood pre-meal sugar is 201-250 take +4 units of Novolog;  If blood pre-meal sugar is 251-300 take +5 units of Novolog;  If blood pre-meal sugar is 301-350 take +6 units of Novolog;  If blood pre-meal sugar is 351-400+ take +7 units of Novolog        New DM regimen as follows:  Continue Mounjaro 7.5 mg weekly  Continue Metformin 1000 mg bid  Continue Toujeo 28 units qhs   Increase Novolog 24 units ac lunch and 4 units plus ss ac breakfast and dinner (as previously ordered by DO Ceron)

## 2024-01-17 ENCOUNTER — TELEPHONE (OUTPATIENT)
Dept: ORTHOPEDICS | Facility: CLINIC | Age: 54
End: 2024-01-17
Payer: MEDICAID

## 2024-01-17 ENCOUNTER — TELEPHONE (OUTPATIENT)
Dept: DIABETES | Facility: CLINIC | Age: 54
End: 2024-01-17
Payer: MEDICAID

## 2024-01-17 RX ORDER — ESTRADIOL 0.1 MG/G
1 CREAM VAGINAL
Qty: 42.5 G | Refills: 3 | Status: SHIPPED | OUTPATIENT
Start: 2024-01-18 | End: 2025-01-17

## 2024-01-17 NOTE — TELEPHONE ENCOUNTER
Spoke with patient and gave her the new regimen. Patient voiced understanding       New DM regimen as follows:  Continue Mounjaro 7.5 mg weekly  Continue Metformin 1000 mg bid  Continue Toujeo 28 units qhs   Increase Novolog 24 units ac lunch and 4 units plus ss ac breakfast and dinner (as previously ordered by DO Ceron)

## 2024-01-17 NOTE — TELEPHONE ENCOUNTER
"Spoke to the patient who stating that she is in extreme pain. I offered her an appointment to come in an discuss everything. She responded that "I don't want to discuss anything. I want pain meds" I told the patient that I would send a request for pain medication to Ms. Casillas Javier AMADOR who will be back in the office tomorrow I verified her pharmacy an that she wants it sent to [facility]. I also explained to her that I can not guarantee that Ms Casillas will fill the request but that I would send it. Patient verbalized understanding an then ended the call. I routed message an request to Sonja Herrera PA-C       ----- Message from Sherry Polo sent at 1/17/2024  9:56 AM CST -----  Contact: patient  [phone]  Patient called requesting a call back from Dr. Roth's nurse, regarding her pain level    "

## 2024-01-17 NOTE — TELEPHONE ENCOUNTER
----- Message from Clarisa Phillips sent at 1/16/2024  4:52 PM CST -----  Regarding: pt called  Name of Who is Calling: PILO YANCEY [[id number]]      What is the request in detail: requesting to speak with the nurse about her blood sugars,. Please advise       Can the clinic reply by [facility]: No      What Number to Call Back [facility]: Telephone Information:           [phone]

## 2024-01-18 ENCOUNTER — OFFICE VISIT (OUTPATIENT)
Dept: PRIMARY CARE CLINIC | Facility: CLINIC | Age: 54
End: 2024-01-18
Payer: MEDICAID

## 2024-01-18 DIAGNOSIS — R30.0 DYSURIA: ICD-10-CM

## 2024-01-18 DIAGNOSIS — K59.09 CHRONIC CONSTIPATION: Primary | ICD-10-CM

## 2024-01-18 DIAGNOSIS — N76.1 SUBACUTE VAGINITIS: ICD-10-CM

## 2024-01-18 PROBLEM — M45.9 ANKYLOSING SPONDYLITIS: Status: ACTIVE | Noted: 2024-01-18

## 2024-01-18 PROCEDURE — 3072F LOW RISK FOR RETINOPATHY: CPT | Mod: CPTII,95,, | Performed by: FAMILY MEDICINE

## 2024-01-18 PROCEDURE — 99214 OFFICE O/P EST MOD 30 MIN: CPT | Mod: 95,,, | Performed by: FAMILY MEDICINE

## 2024-01-18 PROCEDURE — 4010F ACE/ARB THERAPY RXD/TAKEN: CPT | Mod: CPTII,95,, | Performed by: FAMILY MEDICINE

## 2024-01-18 NOTE — TELEPHONE ENCOUNTER
----- Message from Pati Solorzano sent at 8/14/2020 10:33 AM CDT -----  Regarding: appt  Good morning,    Pt would like a call back in regards to appointment and can be reached at [phone]      Thanks,  Pati Solorzano    
Pt scheduled appt with ENT. Referral placed from subspeciality (Cardio).   
Heavy daily intake c/w tolerance and dependence;   - c/w BLAIRE, on ativan taper, now taking PO will change to librium   - c/w Thiamine high dose IVPB x5 days   - seizure, fall precautions
Acute, 2 episodes of bright blood emesis, volume estimated by the pt = 1/2 glass x 2  - denies recurrence here, Hb stable  - c/w Protonix IV BID  - trend Hb, active T&S, transfuse <7  - GI following   - no plan for EGD today per d/w GI, will advance diet
Heavy daily intake c/w tolerance and dependence;   - c/w BLAIRE, on ativan taper, now taking PO will change to librium to end this evening   - c/w Thiamine high dose IVPB x5 days   - seizure, fall precautions

## 2024-01-18 NOTE — PROGRESS NOTES
Subjective:    The patient location is: [place] at home  Visit type: Virtual visit with synchronous audio and video  Total time spent with patient:30 mins  This includes face to face time and non-face to face time preparing to see the patient (eg, review of tests), obtaining and/or reviewing separately obtained history, documenting clinical information in the electronic or other health record, independently interpreting results and communicating results to the patient/family/caregiver, or care coordinator.   Each patient to whom he or she provides medical services by telemedicine is:  (1) informed of the relationship between the physician and patient and the respective role of any other health care provider with respect to management of the patient; and (2) notified that he or she may decline to receive medical services by telemedicine and may withdraw from such care at any time.   Patient ID: Yolanda Betts is a 53 y.o. female.    Chief Complaint: Dysuria and constipation      History of Present Illness:   Yolanda eBtts 53 y.o. female presents today with   Since her last visit she has had renee CTS release, c/o constipation: acute on chronic which has failed OTC med and Linzess. Recently started on mounjaro.  Waiting to get back into IOP for depression.  Denies SI/HI.  Past Medical History:   Diagnosis Date    Abnormal Pap smear of cervix     HPV genital warts    Anemia     Anxiety     Arthritis     Asthma 10/11/2016    Bipolar 1 disorder     Diabetes mellitus, type 2     Dyslipidemia associated with type 2 diabetes mellitus 05/13/2019    General anesthetics causing adverse effect in therapeutic use     Genital warts     GERD (gastroesophageal reflux disease)     Herpes simplex virus (HSV) infection     Hyperlipidemia     Hypertension     Hypertension complicating diabetes 05/05/2019    Migraine with aura and without status migrainosus, not intractable 03/21/2016    Mild persistent asthma without  "complication 10/11/2016    Morbid obesity with body mass index (BMI) of 45.0 to 49.9 in adult 08/03/2017    Obstructive sleep apnea     ALEN (obstructive sleep apnea)     2L per N/C q HS    Schizoaffective disorder, bipolar type 04/25/2019    Seasonal allergic rhinitis due to pollen 05/13/2019    Type 2 diabetes mellitus with hyperglycemia, with long-term current use of insulin 12/21/2017    Type 2 diabetes mellitus with hyperglycemia, without long-term current use of insulin 12/21/2017     Family History   Problem Relation Age of Onset    Diabetes Mother     Hyperlipidemia Mother     Hypertension Mother     Asthma Mother     COPD Mother     Glaucoma Mother     Thyroid disease Mother     Anesthesia problems Mother         "almost had a cardiac arrest" , blood clots    Hypertension Father     Hyperlipidemia Father     Cancer Father         Brain, lung, liver, kidney    No Known Problems Sister     Hypertension Brother     Alcohol abuse Brother     Heart disease Maternal Grandmother     Hyperlipidemia Maternal Grandmother     Hypertension Maternal Grandmother     Cataracts Maternal Grandmother     Diabetes Maternal Grandmother     Heart disease Maternal Grandfather     Hyperlipidemia Maternal Grandfather     Hypertension Maternal Grandfather     Glaucoma Maternal Grandfather     Cancer Maternal Grandfather     Cataracts Maternal Grandfather     Macular degeneration Maternal Grandfather     Diabetes Maternal Grandfather     Heart disease Paternal Grandmother     Hyperlipidemia Paternal Grandmother     Hypertension Paternal Grandmother     Cataracts Paternal Grandmother     Heart disease Paternal Grandfather     Hyperlipidemia Paternal Grandfather     Hypertension Paternal Grandfather     Cataracts Paternal Grandfather     Breast cancer Maternal Cousin     Breast cancer Maternal Cousin     Breast cancer Maternal Cousin     Breast cancer Maternal Cousin      Social History     Socioeconomic History    Marital status: " Single   Tobacco Use    Smoking status: Never    Smokeless tobacco: Never   Substance and Sexual Activity    Alcohol use: Yes     Alcohol/week: 0.0 standard drinks of alcohol     Comment: socially  No alcohol 72h prior to sx    Drug use: No    Sexual activity: Yes     Partners: Male     Birth control/protection: Surgical     Comment: hyst   Social History Narrative    Long-term care nurse     Social Determinants of Health     Financial Resource Strain: High Risk (1/18/2024)    Overall Financial Resource Strain (CARDIA)     Difficulty of Paying Living Expenses: Very hard   Food Insecurity: Food Insecurity Present (1/18/2024)    Hunger Vital Sign     Worried About Running Out of Food in the Last Year: Often true     Ran Out of Food in the Last Year: Often true   Transportation Needs: Unmet Transportation Needs (1/18/2024)    PRAPARE - Transportation     Lack of Transportation (Medical): Yes     Lack of Transportation (Non-Medical): Yes   Physical Activity: Inactive (1/18/2024)    Exercise Vital Sign     Days of Exercise per Week: 0 days     Minutes of Exercise per Session: 0 min   Stress: Stress Concern Present (1/18/2024)    [place] [place] [employer] - Occupational Stress Questionnaire     Feeling of Stress : Very much   Social Connections: Socially Isolated (1/18/2024)    Social Connection and Isolation Panel [NHANES]     Frequency of Communication with Friends and Family: Twice a week     Frequency of Social Gatherings with Friends and Family: Never     Attends Hoahaoism Services: More than 4 times per year     Active Member of Clubs or Organizations: No     Attends Club or Organization Meetings: Never     Marital Status:    Housing Stability: High Risk (1/18/2024)    Housing Stability Vital Sign     Unable to Pay for Housing in the Last Year: Yes     Number of Places Lived in the Last Year: 1     Unstable Housing in the Last Year: No     Outpatient Encounter Medications as of 1/18/2024  "  Medication Sig Dispense Refill    ALPRAZolam (XANAX) 1 MG tablet Take 1 tablet (1 mg total) by mouth 2 (two) times daily as needed for Anxiety. 60 tablet 1    amlodipine-valsartan (EXFORGE)  mg per tablet Take 1 tablet by mouth once daily. 90 tablet 3    aspirin (ECOTRIN) 81 MG EC tablet Take 1 tablet by mouth daily 30 tablet 0    atorvastatin (LIPITOR) 20 MG tablet Take 1 tablet (20 mg total) by mouth every evening. 90 tablet 3    BD INSULIN SYRINGE ULTRA-FINE 1/2 mL 30 gauge x 1/2" Syrg   0    blood sugar diagnostic (ONETOUCH ULTRA TEST) Strp 1 each by Misc.(Non-Drug; Combo Route) route 4 (four) times daily. [facility] Glucose Test Strips 400 strip 3    blood sugar diagnostic (ONETOUCH ULTRA TEST) Strp Check blood glucose 4 times daily as directed and as needed (dispense insurance preferred brand or patient choice) 200 each 5    blood-glucose sensor (DEXCOM G7 SENSOR) Mariela Use to check bg. Change every 10 days 3 each 11    DULoxetine (CYMBALTA) 60 MG capsule Take 1 capsule (60 mg total) by mouth 2 (two) times daily. 180 capsule 3    ergocalciferol (VITAMIN D2) 50,000 unit Cap Take 1 capsule twice weekly for 3 weeks then weekly 12 capsule 3    estradioL (ESTRACE) 0.01 % (0.1 mg/gram) vaginal cream Place 1 g vaginally twice a week. 42.5 g 3    fenofibrate (TRICOR) 145 MG tablet Take 1 tablet (145 mg total) by mouth once daily. 90 tablet 3    flash glucose scanning reader (FREECogency SoftwareYLE AMY 2 READER) Misc Use as directed to check blood sugar 1 each 1    fluticasone propionate (FLONASE) 50 mcg/actuation nasal spray 2 sprays (100 mcg total) by Each Nare route once daily. 16 g 11    frovatriptan (FROVA) 2.5 MG tablet Take 1 tablet at onset of acute migraine. May take 1 more tablet 2 hours later if needed. (MAX 2 TABLET PER DAY. MAX 4 TABLETS PER WEEK.) 9 tablet 1    furosemide (LASIX) 20 MG tablet Take 1 tablet (20 mg total) by mouth once daily. 90 tablet 3    glucagon (BAQSIMI) 3 mg/actuation Spry 1 spray by Nasal " "route as needed (hypoglycemia). For emergency use. May repeat 1 time after 15 minutes 2 each 1    insulin aspart U-100 (NOVOLOG FLEXPEN U-100 INSULIN) 100 unit/mL (3 mL) InPn pen Inject 24 Units into the skin 3 (three) times daily before meals. Use per sliding scale for breakfast and dinner 45 mL 1    insulin glargine, TOUJEO, (TOUJEO SOLOSTAR U-300 INSULIN) 300 unit/mL (1.5 mL) InPn pen Inject 16 Units into the skin every evening. 3 pen 1    lamoTRIgine (LAMICTAL) 150 MG Tab Take 1 tablet (150 mg total) by mouth every morning. 30 tablet 2    lancets (ONETOUCH DELICA PLUS LANCET) 30 gauge Misc Use as directed 3 times daily 100 each 11    levocetirizine (XYZAL) 5 MG tablet Take 1 tablet (5 mg total) by mouth every evening. 30 tablet 11    lifitegrast (XIIDRA) 5 % Dpet Place 1 drop into both eyes 2 (two) times daily. 60 each 12    lurasidone (LATUDA) 80 mg Tab tablet Take 1 tablet (80 mg total) by mouth once daily. 30 tablet 2    magnesium oxide (MAG-OX) 400 mg (241.3 mg magnesium) tablet Take 1 tablet (400 mg total) by mouth once daily. 90 tablet 3    metFORMIN (GLUCOPHAGE) 1000 MG tablet Take 1 tablet (1,000 mg total) by mouth 2 (two) times daily with meals. 180 tablet 1    metoprolol succinate (TOPROL-XL) 50 MG 24 hr tablet Take 1 tablet (50 mg total) by mouth every evening. 30 tablet 11    mirtazapine (REMERON) 15 MG tablet Take 1 tablet by mouth at bedtime. 30 tablet 1    montelukast (SINGULAIR) 10 mg tablet Take 10 mg by mouth.      omeprazole (PRILOSEC) 40 MG capsule Take 1 capsule (40 mg total) by mouth once daily. 30 capsule 11    pen needle, diabetic (BD ULTRA-FINE MINI PEN NEEDLE) 31 gauge x 3/16" Ndle Use one needle 5 times a day 200 each 11    pen needle, diabetic (BD ULTRA-FINE MINI PEN NEEDLE) 31 gauge x 3/16" Ndle Use 5 a day 200 each 11    pneumoc 20-tatiana conj-dip cr,PF, (PREVNAR-20, PF,) 0.5 mL Syrg injection Inject 0.5 mLs into the muscle. 0.5 mL 0    polyethylene glycol (COLYTE) 240-22.72-6.72 -5.84 " gram SolR Take 4,000 mLs by mouth once. for 1 dose 4000 mL 0    promethazine (PHENERGAN) 12.5 MG Tab       prucalopride (MOTEGRITY) 2 mg Tab Take 1 tablet (2 mg) by mouth once daily. 30 tablet 11    secukinumab (COSENTYX PEN, 2 PENS,) 150 mg/mL PnIj Inject 300 mg (2 mL) into the skin every 28 days. 2 mL 6    secukinumab (COSENTYX UNOREADY PEN) 300 mg/2 mL (150 mg/mL) PnIj Inject 300 mg into the skin every 7 days. Insurance denied reloading 150 dose.  Now w PsO and worsening AS (off cosentyx for dental infxn).  Any chance of getting reloading at 300 approved for diff indication? for 5 doses 2 mL 6    spironolactone (ALDACTONE) 25 MG tablet Take 1 tablet (25 mg total) by mouth once daily. 90 tablet 3    tiZANidine (ZANAFLEX) 4 MG tablet Take 2 tablets (8 mg total) by mouth every 6 (six) hours as needed (Muscle spasms). 240 tablet 0    traMADoL (ULTRAM) 50 mg tablet Take 1 tablet (50 mg total) by mouth every 6 (six) hours as needed for Pain. 28 tablet 0    [DISCONTINUED] clonazePAM (KLONOPIN) 1 MG tablet Take 1 tablet by mouth daily 30 tablet 0    [DISCONTINUED] estradioL (ESTRACE) 0.01 % (0.1 mg/gram) vaginal cream Place 1 g vaginally twice a week. 42.5 g 2    [DISCONTINUED] glucagon, human recombinant, (GLUCAGON EMERGENCY KIT, HUMAN,) 1 mg SolR Inject 1 mg into the muscle as needed. Emergency use 1 each 1    [DISCONTINUED] tirzepatide 7.5 mg/0.5 mL PnIj Inject 7.5 mg into the skin every 7 days. 4 Pen 5    [DISCONTINUED] valsartan (DIOVAN) 320 MG tablet Take 1 tablet (320 mg total) by mouth once daily. 90 tablet 3    [DISCONTINUED] zolpidem (AMBIEN) 10 mg Tab Take 1 tablet (10 mg total) by mouth every evening. 30 tablet 1     No facility-administered encounter medications on file as of 1/18/2024.       Review of Systems   Constitutional:  Positive for activity change, fatigue and unexpected weight change.   HENT:  Negative for hearing loss, rhinorrhea and trouble swallowing.    Eyes:  Negative for discharge and visual  disturbance.   Respiratory:  Negative for chest tightness and wheezing.    Cardiovascular:  Negative for chest pain and palpitations.   Gastrointestinal:  Positive for constipation. Negative for blood in stool, diarrhea and vomiting.   Endocrine: Positive for polydipsia and polyuria.   Genitourinary:  Positive for difficulty urinating and dysuria. Negative for hematuria and menstrual problem.   Musculoskeletal:  Positive for arthralgias, joint swelling and neck pain.   Neurological:  Positive for headaches. Negative for weakness.   Psychiatric/Behavioral:  Positive for dysphoric mood. Negative for confusion.        Objective:      There were no vitals taken for this visit.  Physical Exam    CONSTITUTIONAL: No apparent distress. Appears comfortable. Does not appear acutely ill or septic. Appears adequately hydrated.  CARDIOVASCULAR: No perioral cyanosis  PULMONARY: Breathing unlabored. No retractions Chest expansion grossly normal.  PSYCHIATRIC: Alert and conversant and grossly oriented. Mood is grossly neutral. Affect appropriate. Judgment and insight grossly intact.  NEUROLOGIC: No focal sensory deficits reported.   Results for orders placed or performed in visit on 01/11/24   CBC Auto Differential   Result Value Ref Range    WBC 8.74 3.90 - 12.70 K/uL    RBC 4.10 4.00 - 5.40 M/uL    Hemoglobin 10.8 (L) 12.0 - 16.0 g/dL    Hematocrit 34.7 (L) 37.0 - 48.5 %    MCV 85 82 - 98 fL    MCH 26.3 (L) 27.0 - 31.0 pg    MCHC 31.1 (L) 32.0 - 36.0 g/dL    RDW 15.0 (H) 11.5 - 14.5 %    Platelets 499 (H) 150 - 450 K/uL    MPV 8.9 (L) 9.2 - 12.9 fL    Immature Granulocytes 0.5 0.0 - 0.5 %    Gran # (ANC) 4.1 1.8 - 7.7 K/uL    Immature Grans (Abs) 0.04 0.00 - 0.04 K/uL    Lymph # 3.8 1.0 - 4.8 K/uL    Mono # 0.6 0.3 - 1.0 K/uL    Eos # 0.2 0.0 - 0.5 K/uL    Baso # 0.08 0.00 - 0.20 K/uL    nRBC 0 0 /100 WBC    Gran % 46.7 38.0 - 73.0 %    Lymph % 43.1 18.0 - 48.0 %    Mono % 6.6 4.0 - 15.0 %    Eosinophil % 2.2 0.0 - 8.0 %     Basophil % 0.9 0.0 - 1.9 %    Differential Method Automated    Comprehensive Metabolic Panel   Result Value Ref Range    Sodium 138 136 - 145 mmol/L    Potassium 4.7 3.5 - 5.1 mmol/L    Chloride 103 95 - 110 mmol/L    CO2 26 23 - 29 mmol/L    Glucose 89 70 - 110 mg/dL    BUN 18 6 - 20 mg/dL    Creatinine 1.0 0.5 - 1.4 mg/dL    Calcium 10.1 8.7 - 10.5 mg/dL    Total Protein 7.3 6.0 - 8.4 g/dL    Albumin 4.0 3.5 - 5.2 g/dL    Total Bilirubin 0.2 0.1 - 1.0 mg/dL    Alkaline Phosphatase 59 55 - 135 U/L    AST 12 10 - 40 U/L    ALT 17 10 - 44 U/L    eGFR >60 >60 mL/min/1.73 m^2    Anion Gap 9 8 - 16 mmol/L   Sedimentation rate   Result Value Ref Range    Sed Rate 18 0 - 36 mm/Hr   C-Reactive Protein   Result Value Ref Range    CRP 5.6 0.0 - 8.2 mg/L     *Note: Due to a large number of results and/or encounters for the requested time period, some results have not been displayed. A complete set of results can be found in Results Review.     Assessment:       1. Chronic constipation    2. Dysuria    3. Subacute vaginitis        Plan:   1. Chronic constipation  Overview:  She has chronic constipation and have failed diff meds including amitiza, linzess, lactulose etc. Recently started on Mounjaro. Worsening constipation, feels bloated all the time and not eating.  Stop the mounjaro. Screen with xray.  See medication    Orders:  -     polyethylene glycol (COLYTE) 240-22.72-6.72 -5.84 gram SolR; Take 4,000 mLs by mouth once. for 1 dose  Dispense: 4000 mL; Refill: 0  -     X-Ray Abdomen Flat And Erect; Future; Expected date: 01/18/2024    2. Dysuria  Overview:  Dysuria for 4 mons. Scanlon when she is urinating. Denies hematuria.  She will come in tomorrow for sampling of urine to guide therapy.    Orders:  -     Urinalysis, Reflex to Urine Culture Urine, Clean Catch; Future; Expected date: 01/19/2024    3. Subacute vaginitis  Overview:  -Reports that she has a fishy odor in the vagina without discharge. Present for a year. She  has been swabbed 3 times, twice pos for yeast and the last was negative. Last sexual activity is a year ago. Hep C and HIV has been found negative.  Will need further eval.      Orders:  -     RPR; Future; Expected date: 01/18/2024  -     C. trachomatis/N. gonorrhoeae by AMP DNA; Future; Expected date: 01/19/2024        I have reviewed all of the patient's clinical history available in care everywhere and Epic and have utilized this in my evaluation and management recommendations today.      Treatment options and alternatives were discussed with the patient. Patient was given ample time to ask questions. All questions were answered. Voices understanding and acceptance of this advice. Will call back if any further questions or concerns.                Sasha Sorenson MD  [facility],

## 2024-01-22 ENCOUNTER — TELEPHONE (OUTPATIENT)
Dept: PSYCHIATRY | Facility: CLINIC | Age: 54
End: 2024-01-22
Payer: MEDICAID

## 2024-01-22 ENCOUNTER — TELEPHONE (OUTPATIENT)
Dept: PAIN MEDICINE | Facility: CLINIC | Age: 54
End: 2024-01-22
Payer: MEDICAID

## 2024-01-22 ENCOUNTER — TELEPHONE (OUTPATIENT)
Dept: OBSTETRICS AND GYNECOLOGY | Facility: CLINIC | Age: 54
End: 2024-01-22
Payer: MEDICAID

## 2024-01-22 DIAGNOSIS — F25.0 SCHIZOAFFECTIVE DISORDER, BIPOLAR TYPE: Chronic | ICD-10-CM

## 2024-01-22 DIAGNOSIS — F41.1 GENERALIZED ANXIETY DISORDER: Chronic | ICD-10-CM

## 2024-01-22 RX ORDER — LAMOTRIGINE 200 MG/1
200 TABLET ORAL 2 TIMES DAILY
Qty: 60 TABLET | Refills: 2 | Status: SHIPPED | OUTPATIENT
Start: 2024-01-22 | End: 2024-05-08 | Stop reason: SDUPTHER

## 2024-01-22 RX ORDER — ALPRAZOLAM 1 MG/1
1 TABLET ORAL 2 TIMES DAILY PRN
Qty: 60 TABLET | Refills: 2 | Status: SHIPPED | OUTPATIENT
Start: 2024-01-22 | End: 2024-05-08 | Stop reason: SDUPTHER

## 2024-01-22 RX ORDER — LURASIDONE HYDROCHLORIDE 80 MG/1
80 TABLET, FILM COATED ORAL DAILY
Qty: 30 TABLET | Refills: 2 | Status: SHIPPED | OUTPATIENT
Start: 2024-01-22 | End: 2024-02-21 | Stop reason: SDUPTHER

## 2024-01-22 NOTE — TELEPHONE ENCOUNTER
----- Message from Katerin Russo sent at 1/22/2024  9:01 AM CST -----  Contact: Yolanda Guerrero is calling to speak to the nurse regarding vaginal discharge and urinary problems, please give her a call back at     Thanks  LJ

## 2024-01-22 NOTE — TELEPHONE ENCOUNTER
----- Message from Sasha Sorenson MD sent at 1/19/2024  8:04 PM CST -----  Regarding: RE: PLEASE RESPOND PT PROCEDURE IS NEXT WEEK  Ok to hold ASA for the procedure.  ----- Message -----  From: Juventino Guevara MA  Sent: 1/19/2024   1:33 PM CST  To: Sasha Sorenson MD  Subject: PLEASE RESPOND PT PROCEDURE IS NEXT WEEK          Dr. Sorenson    Yolanda Betts was seen in office with complaints of severe neck pain. Dr. Phillips would like to perform cervical epidural, and Yolanda Betts would like to proceed. Dr. Phillips is requesting for clearance to hold ASA 5 days prior to procedure. Patient Yolanda Betts can resume medication following the procedure.     Thank You,  Jessica KEY  [facility] Scheduler

## 2024-01-22 NOTE — TELEPHONE ENCOUNTER
"Called pt--she has not been to [facility] "in quite some time"--she said that she went in December. She had surgery on Dec 22--has not been to [facility] since that time.    [facility] increased Lamictal to twice daily (100 mg bid); Latuda 80 mg, Cymbalta, etc.    She reported having "family problems still"--"just my emotions have been all over the place. I'm shopping. I can't pinpoint one thing, but I'm just down." The holidays were difficult--she reported that she spent Thanksgiving with the family, and it did not go well; then Christmas Razia with them and on Sandy Day stayed home. She lives alone. She also spent New Years alone. "Everybody else works so it's hard to get in contact with them." She said that her friends are too busy or are  or have boyfriends. She likes to cook but has not been able to do that because of her hands. She is buying things online.    She wants more one on one care--not getting what she needs at the IOP. She is interested in returning to her community therapist.    Therapist: Feroz Stanley LCSW with [facility]  Ph: [phone]  Fax: [phone]    She needs refills--using the [facility] pharmacy    Plan--continue Lamictal 200 mg; Latuda 80 mg; Xanax 1 mg bid; others by other providers       "

## 2024-01-22 NOTE — TELEPHONE ENCOUNTER
Left pt a message to return call regarding her message sent    ----- Message from Katerin Russo sent at 1/22/2024  8:58 AM CST -----  Contact: Yolanda Guerrero is calling to speak to someone with [facility] office, did not state the nature of call just requesting a calling back asap at     Thanks  LJ

## 2024-01-22 NOTE — TELEPHONE ENCOUNTER
Called patient and offered same day appointment.  Patient unable to come today.  Scheduled appointment per patient request on 1/29.

## 2024-01-22 NOTE — TELEPHONE ENCOUNTER
Cardiac Clearance pt cleared to hold ASA for 5 days for Cervical ROCAEL with Dr Phillips.    [facility]und[facility] Javier, [facility]

## 2024-01-22 NOTE — PRE-PROCEDURE INSTRUCTIONS
Spoke with patient regarding upcoming pain procedure on 1.25 with Dr. Phillips. Pt has pending UA for dysuria and vagina odor. Per pt she is completing UA today. Informed pt negative results must be received prior to procedure. Verbalized understanding.

## 2024-01-22 NOTE — TELEPHONE ENCOUNTER
Patient called in this afternoon needing someone to call her back she is depressed and in a crisis and needs to speak with someone right now. She said she does not feel she is going to hurt herself. Nurse returned call to patient. Nurse asked patient how long has this been going on. She stated for about 2 months and it has progressively gotten worse. She does not want to leave her home.  She stated that she feels low and that the IOP is not working. She stated that she feels like she wants to get admitted but she is not suicidal. Nurse stated to patient that you don't have to be suicidal to go inpatient, sometimes you may need to monitored and may need a medication adjustment. Nurse asked the patient if she had some one to drive to the ER, she stated no. Nurse then suggested that she would have to call the  dept to do a well check and they would escort her to the ER. Patient stated she want to speak her provider first to advise her. Nurse stated she will give the provider an message to return call. Patient okay and thank you.

## 2024-01-23 ENCOUNTER — LAB VISIT (OUTPATIENT)
Dept: LAB | Facility: HOSPITAL | Age: 54
End: 2024-01-23
Attending: FAMILY MEDICINE
Payer: MEDICAID

## 2024-01-23 DIAGNOSIS — N76.1 SUBACUTE VAGINITIS: ICD-10-CM

## 2024-01-23 PROCEDURE — 86592 SYPHILIS TEST NON-TREP QUAL: CPT | Performed by: FAMILY MEDICINE

## 2024-01-23 PROCEDURE — 36415 COLL VENOUS BLD VENIPUNCTURE: CPT | Mod: PN | Performed by: FAMILY MEDICINE

## 2024-01-24 LAB — RPR SER QL: NORMAL

## 2024-01-24 RX ORDER — SECUKINUMAB 300 MG/2ML
300 INJECTION SUBCUTANEOUS
Qty: 2 ML | Refills: 6 | Status: ACTIVE | OUTPATIENT
Start: 2024-01-24 | End: 2024-01-29 | Stop reason: SDUPTHER

## 2024-01-24 NOTE — PRE-PROCEDURE INSTRUCTIONS
Spoke with patient regarding procedure scheduled on 1.25     Arrival time 1100     Has patient been sick with fever or on antibiotics within the last 7 days? No     Does the patient have any open wounds, sores or rashes? No     Does the patient have any recent fractures? no     Has patient received a vaccination within the last 7 days? No     Received the COVID vaccination? yes     Has the patient stopped all medications as directed? hold asa 5 days prior to procedure. Cardiac clearance obtained from dr covington on 1.22     Does patient have a pacemaker, defibrillator, or implantable stimulator? No     Does the patient have a ride to and from procedure and someone reliable to remain with patient?  kaiser      Is the patient diabetic? yes     Does the patient have sleep apnea? Or use O2 at home? duarte on cpap      Is the patient receiving sedation? yes     Is the patient instructed to remain NPO beginning at midnight the night before their procedure? yes     Procedure location confirmed with patient? Yes     Covid- Denies signs/symptoms. Instructed to notify PAT/MD if any changes.

## 2024-01-26 ENCOUNTER — TELEPHONE (OUTPATIENT)
Dept: RHEUMATOLOGY | Facility: CLINIC | Age: 54
End: 2024-01-26
Payer: MEDICAID

## 2024-01-26 ENCOUNTER — TELEPHONE (OUTPATIENT)
Dept: PODIATRY | Facility: CLINIC | Age: 54
End: 2024-01-26
Payer: MEDICAID

## 2024-01-26 ENCOUNTER — TELEPHONE (OUTPATIENT)
Dept: PRIMARY CARE CLINIC | Facility: CLINIC | Age: 54
End: 2024-01-26
Payer: MEDICAID

## 2024-01-26 DIAGNOSIS — M47.819 SPONDYLOARTHROPATHY: ICD-10-CM

## 2024-01-26 DIAGNOSIS — Z79.899 HIGH RISK MEDICATION USE: ICD-10-CM

## 2024-01-26 DIAGNOSIS — M45.9 ANKYLOSING SPONDYLITIS, UNSPECIFIED SITE OF SPINE: Primary | ICD-10-CM

## 2024-01-26 NOTE — PRE-PROCEDURE INSTRUCTIONS
Spoke with patient regarding procedure scheduled on 1.30     Arrival time 0845     Has patient been sick with fever or on antibiotics within the last 7 days? No     Does the patient have any open wounds, sores or rashes? No     Does the patient have any recent fractures? no     Has patient received a vaccination within the last 7 days? No     Received the COVID vaccination? yes     Has the patient stopped all medications as directed? hold asa 5 days prior to procedure. Cardiac clearance obtained from dr covington on 1.22     Does patient have a pacemaker, defibrillator, or implantable stimulator? No     Does the patient have a ride to and from procedure and someone reliable to remain with patient?  kaiser      Is the patient diabetic? yes     Does the patient have sleep apnea? Or use O2 at home? duarte on cpap      Is the patient receiving sedation? yes     Is the patient instructed to remain NPO beginning at midnight the night before their procedure? yes     Procedure location confirmed with patient? Yes     Covid- Denies signs/symptoms. Instructed to notify PAT/MD if any changes.

## 2024-01-26 NOTE — TELEPHONE ENCOUNTER
Called to scheduled patient next available for feet pain. Scheduled virtual 1/30/24 at 2:40pm she voiced understanding.

## 2024-01-26 NOTE — TELEPHONE ENCOUNTER
Called the patient to get her scheduled, pt was very irate and said she needed to be seen today, due to her insurance the 1st available appointment that came up was Feb 29, with Dr Sweeney. I tried to schedule her with another provider but the date was further than Feb 29 also due to her insurance. The pt started to raise her her voice at me and demanded that Dr Sweeney do something for without her being seen 1st, I tried to explain that without Dr Sweeney seen her there was nothing that can be done 1st, the patient became more irate, telling me she has Planter Fasciitis even tho she was diagnosed by a physician. I ended the call when she kept yelling and would not allow me to speak and demanded to speak to Dr Sweeney, Dr Sweeney was in a room and could not talk to her.                  ----- Message from Shikha Crisostomo sent at 1/26/2024  9:19 AM CST -----  Pt states that she is having extreme pain in foot,Please call back at [phone].Thanks

## 2024-01-26 NOTE — TELEPHONE ENCOUNTER
----- Message from Shikha Crisostomo sent at 1/26/2024  9:20 AM CST -----  Pt is having extreme pain in foot ,Please call back at [phone]

## 2024-01-26 NOTE — TELEPHONE ENCOUNTER
----- Message from Katerin Russo sent at 1/26/2024  3:52 PM CST -----  Contact: Yolanda Guerrero is calling to speak to the nurse regarding a sooner appointment for severe pain in feet, please give her a call at  [phone]    Thanks  LJ

## 2024-01-30 ENCOUNTER — TELEPHONE (OUTPATIENT)
Dept: DIABETES | Facility: CLINIC | Age: 54
End: 2024-01-30
Payer: MEDICAID

## 2024-01-30 ENCOUNTER — PATIENT MESSAGE (OUTPATIENT)
Dept: PODIATRY | Facility: CLINIC | Age: 54
End: 2024-01-30
Payer: MEDICAID

## 2024-01-30 ENCOUNTER — TELEPHONE (OUTPATIENT)
Dept: PRIMARY CARE CLINIC | Facility: CLINIC | Age: 54
End: 2024-01-30

## 2024-01-30 ENCOUNTER — OFFICE VISIT (OUTPATIENT)
Dept: PRIMARY CARE CLINIC | Facility: CLINIC | Age: 54
End: 2024-01-30
Payer: MEDICAID

## 2024-01-30 ENCOUNTER — HOSPITAL ENCOUNTER (OUTPATIENT)
Facility: HOSPITAL | Age: 54
Discharge: HOME | End: 2024-01-30
Attending: PHYSICAL MEDICINE & REHABILITATION | Admitting: PHYSICAL MEDICINE & REHABILITATION
Payer: MEDICAID

## 2024-01-30 ENCOUNTER — NURSE TRIAGE (OUTPATIENT)
Dept: ADMINISTRATIVE | Facility: CLINIC | Age: 54
End: 2024-01-30
Payer: MEDICAID

## 2024-01-30 VITALS
SYSTOLIC BLOOD PRESSURE: 145 MMHG | OXYGEN SATURATION: 96 % | BODY MASS INDEX: 40.66 KG/M2 | TEMPERATURE: 97 F | HEIGHT: 56 IN | HEART RATE: 71 BPM | DIASTOLIC BLOOD PRESSURE: 85 MMHG | WEIGHT: 180.75 LBS | RESPIRATION RATE: 16 BRPM

## 2024-01-30 DIAGNOSIS — E11.65 TYPE 2 DIABETES MELLITUS WITH HYPERGLYCEMIA, WITH LONG-TERM CURRENT USE OF INSULIN: Primary | ICD-10-CM

## 2024-01-30 DIAGNOSIS — Z79.4 TYPE 2 DIABETES MELLITUS WITH HYPERGLYCEMIA, WITH LONG-TERM CURRENT USE OF INSULIN: Primary | ICD-10-CM

## 2024-01-30 DIAGNOSIS — F31.5: Chronic | ICD-10-CM

## 2024-01-30 DIAGNOSIS — R73.9 HYPERGLYCEMIA: ICD-10-CM

## 2024-01-30 DIAGNOSIS — M54.12 CERVICAL RADICULOPATHY: Primary | ICD-10-CM

## 2024-01-30 DIAGNOSIS — M72.2 PLANTAR FASCIITIS, BILATERAL: Primary | ICD-10-CM

## 2024-01-30 DIAGNOSIS — I10 ESSENTIAL HYPERTENSION: Chronic | ICD-10-CM

## 2024-01-30 LAB — POCT GLUCOSE: 98 MG/DL (ref 70–110)

## 2024-01-30 PROCEDURE — 4010F ACE/ARB THERAPY RXD/TAKEN: CPT | Mod: CPTII,95,, | Performed by: FAMILY MEDICINE

## 2024-01-30 PROCEDURE — 62321 NJX INTERLAMINAR CRV/THRC: CPT | Mod: ,,, | Performed by: PHYSICAL MEDICINE & REHABILITATION

## 2024-01-30 PROCEDURE — 25500020 PHARM REV CODE 255: Performed by: PHYSICAL MEDICINE & REHABILITATION

## 2024-01-30 PROCEDURE — 63600175 PHARM REV CODE 636 W HCPCS: Performed by: PHYSICAL MEDICINE & REHABILITATION

## 2024-01-30 PROCEDURE — 82962 GLUCOSE BLOOD TEST: CPT | Performed by: PHYSICAL MEDICINE & REHABILITATION

## 2024-01-30 PROCEDURE — 62321 NJX INTERLAMINAR CRV/THRC: CPT | Performed by: PHYSICAL MEDICINE & REHABILITATION

## 2024-01-30 PROCEDURE — 3072F LOW RISK FOR RETINOPATHY: CPT | Mod: CPTII,95,, | Performed by: FAMILY MEDICINE

## 2024-01-30 PROCEDURE — 99214 OFFICE O/P EST MOD 30 MIN: CPT | Mod: 95,,, | Performed by: FAMILY MEDICINE

## 2024-01-30 RX ORDER — ONDANSETRON HYDROCHLORIDE 2 MG/ML
4 INJECTION, SOLUTION INTRAVENOUS ONCE AS NEEDED
Status: ACTIVE | OUTPATIENT
Start: 2024-01-30 | End: 2035-06-27

## 2024-01-30 RX ORDER — DEXAMETHASONE SODIUM PHOSPHATE 10 MG/ML
INJECTION INTRAMUSCULAR; INTRAVENOUS
Status: DISCONTINUED | OUTPATIENT
Start: 2024-01-30 | End: 2024-01-30 | Stop reason: HOSPADM

## 2024-01-30 RX ORDER — BUPIVACAINE HYDROCHLORIDE 2.5 MG/ML
INJECTION, SOLUTION EPIDURAL; INFILTRATION; INTRACAUDAL
Status: DISCONTINUED | OUTPATIENT
Start: 2024-01-30 | End: 2024-01-30 | Stop reason: HOSPADM

## 2024-01-30 RX ORDER — FENTANYL CITRATE 50 UG/ML
INJECTION, SOLUTION INTRAMUSCULAR; INTRAVENOUS
Status: DISCONTINUED | OUTPATIENT
Start: 2024-01-30 | End: 2024-01-30 | Stop reason: HOSPADM

## 2024-01-30 RX ORDER — MIDAZOLAM HYDROCHLORIDE 1 MG/ML
INJECTION, SOLUTION INTRAMUSCULAR; INTRAVENOUS
Status: DISCONTINUED | OUTPATIENT
Start: 2024-01-30 | End: 2024-01-30 | Stop reason: HOSPADM

## 2024-01-30 NOTE — TELEPHONE ENCOUNTER
Spoke with patient to scheduled BP check. Scheduled patient BP. She requested to send message to provider stating her glucose level was 280 and she received a steroid shot this morning for pain in cervical spine. Message sent to provider. Informed once advised she will receive a call with update. She voiced understanding.

## 2024-01-30 NOTE — TELEPHONE ENCOUNTER
For coverage of Elizabeth's messages.    Called patient with regards to c/o elevated blood sugar readings s/p cervical epidural steroid injection this morning. BG readings staying on the 300's during the day. Discussed with patient to continue current diabetes medications however would like to start her on correction dosing q3h for hyperglycemia, outside of mealtime. Correction scale provided as follows.    Novolog every 3 hours using correction scale outside of mealtime.  -200: 2 units  -250: 4 units  -300: 6 units  -350: 8 units  BG greater than 350: 10 units    Instructions given to inform clinic tomorrow if BG readings continue to be elevated despite using the above scale.     She verbalized understanding of above instructions.

## 2024-01-30 NOTE — OP NOTE
Cervical Interlaminar Epidural Steroid Injection under Fluoroscopic Guidance.   Time-out taken to identify patient and procedure prior to starting the procedure.     Date of Procedure: 01/30/2024    PROCEDURE: Cervical Interlaminar epidural steroid injection C5-6 under fluoroscopic guidance.     Pre-Op diagnosis: Cervical radiculopathy [M54.12]    Post-Op diagnosis: Cervical radiculopathy [M54.12]    PHYSICIAN: Otto Phillips MD    ASSISTANTS: None     SEDATION: Conscious sedation provided by M.D    The patient was monitored with continuous pulse oximetry, EKG, and intermittent blood pressure monitors, immediately prior to administration of sedation.  The patient was hemodynamically stable throughout the entire process was responsive to voice, and breathing spontaneously.  Supplemental O2 was provided at 2L/min via nasal cannula.  Patient was comfortable for the duration of the procedure.     There was a total of 2mg IV Midazolam and 75mcg Fentanyl titrated for the procedure    Total sedation time was >10minutes and <20minutes      MEDICATIONS INJECTED: Preservative-free Decadron 10 mg with 1mL of sterile 0.25%Bupivicaine and 3ml of preservative free normal saline.     LOCAL ANESTHETIC INJECTED: Xylocaine 1% 3mL    ESTIMATED BLOOD LOSS: none.     COMPLICATIONS: none.     TECHNIQUE: With the patient laying in a prone position, the area was prepped and draped in the usual sterile fashion using ChloraPrep and a fenestrated drape. Local anesthetic was given using a 27-gauge needle by raising a wheal and going down to the hub of the needle over the C5-6 interlaminar space.  The interlaminar space was then approached with a 3.5 inch 18-gauge Touhy needle was introduced under fluoroscopic guidance in the AP and Lateral view. Once the Ligamentum flavum was encountered loss of resistance to saline was used to enter the epidural space. With positive loss of resistance and negative CSF or Blood, 2mL contrast dye Omnipaque  (300mg/ml) was injected to confirm placement and there was no vascular runoff. The medication was then injected slowly. The patient tolerated the procedure well.         The patient was monitored after the procedure.   They were given post-procedure and discharge instructions to follow at home.  The patient was discharged in a stable condition.

## 2024-01-30 NOTE — DISCHARGE SUMMARY
Discharge Note  Short Stay      SUMMARY     Admit Date: 1/30/2024    Attending Physician: Otto Phillips MD        Discharge Physician: Otto Phillips MD        Discharge Date: 1/30/2024 8:49 AM    Procedure(s) (LRB):  C5/6 IL ROCAEL (N/A)    Final Diagnosis: Cervical radiculopathy [M54.12]    Disposition: Home or self care    Patient Instructions:   Current Discharge Medication List        CONTINUE these medications which have NOT CHANGED    Details   ALPRAZolam (XANAX) 1 MG tablet Take 1 tablet (1 mg total) by mouth 2 (two) times daily as needed for Anxiety.  Qty: 60 tablet, Refills: 2    Associated Diagnoses: Generalized anxiety disorder      amlodipine-valsartan (EXFORGE)  mg per tablet Take 1 tablet by mouth once daily.  Qty: 90 tablet, Refills: 3    Comments: .  Associated Diagnoses: Essential hypertension      atorvastatin (LIPITOR) 20 MG tablet Take 1 tablet (20 mg total) by mouth every evening.  Qty: 90 tablet, Refills: 3    Associated Diagnoses: Dyslipidemia associated with type 2 diabetes mellitus      DULoxetine (CYMBALTA) 60 MG capsule Take 1 capsule (60 mg total) by mouth 2 (two) times daily.  Qty: 180 capsule, Refills: 3    Associated Diagnoses: Generalized anxiety disorder      ergocalciferol (VITAMIN D2) 50,000 unit Cap Take 1 capsule twice weekly for 3 weeks then weekly  Qty: 12 capsule, Refills: 3    Associated Diagnoses: Vitamin D deficiency      fenofibrate (TRICOR) 145 MG tablet Take 1 tablet (145 mg total) by mouth once daily.  Qty: 90 tablet, Refills: 3    Associated Diagnoses: Hypertriglyceridemia      fluticasone propionate (FLONASE) 50 mcg/actuation nasal spray 2 sprays (100 mcg total) by Each Nare route once daily.  Qty: 16 g, Refills: 11    Associated Diagnoses: Seasonal allergic rhinitis due to pollen      frovatriptan (FROVA) 2.5 MG tablet Take 1 tablet at onset of acute migraine. May take 1 more tablet 2 hours later if needed. (MAX 2 TABLET PER DAY. MAX 4 TABLETS PER  WEEK.)  Qty: 9 tablet, Refills: 1    Associated Diagnoses: Migraine with aura and without status migrainosus, not intractable      furosemide (LASIX) 20 MG tablet Take 1 tablet (20 mg total) by mouth once daily.  Qty: 90 tablet, Refills: 3    Associated Diagnoses: Essential hypertension; Hyperaldosteronism; Hyperkalemia      glucagon (BAQSIMI) 3 mg/actuation Spry 1 spray by Nasal route as needed (hypoglycemia). For emergency use. May repeat 1 time after 15 minutes  Qty: 2 each, Refills: 1    Associated Diagnoses: Type 2 diabetes mellitus with hyperglycemia, with long-term current use of insulin      insulin aspart U-100 (NOVOLOG FLEXPEN U-100 INSULIN) 100 unit/mL (3 mL) InPn pen Inject 24 Units into the skin 3 (three) times daily before meals. Use per sliding scale for breakfast and dinner  Qty: 45 mL, Refills: 1    Associated Diagnoses: Type 2 diabetes mellitus with hyperglycemia, with long-term current use of insulin      insulin glargine, TOUJEO, (TOUJEO SOLOSTAR U-300 INSULIN) 300 unit/mL (1.5 mL) InPn pen Inject 16 Units into the skin every evening.  Qty: 3 pen , Refills: 1    Comments: May titrate up to a max dose of 50 units/day.  Associated Diagnoses: Type 2 diabetes mellitus with hyperglycemia, with long-term current use of insulin      lamoTRIgine (LAMICTAL) 200 MG tablet Take 1 tablet (200 mg total) by mouth 2 (two) times daily.  Qty: 60 tablet, Refills: 2    Associated Diagnoses: Schizoaffective disorder, bipolar type      levocetirizine (XYZAL) 5 MG tablet Take 1 tablet (5 mg total) by mouth every evening.  Qty: 30 tablet, Refills: 11      lurasidone (LATUDA) 80 mg Tab tablet Take 1 tablet (80 mg total) by mouth once daily.  Qty: 30 tablet, Refills: 2    Associated Diagnoses: Schizoaffective disorder, bipolar type      magnesium oxide (MAG-OX) 400 mg (241.3 mg magnesium) tablet Take 1 tablet (400 mg total) by mouth once daily.  Qty: 90 tablet, Refills: 3    Associated Diagnoses: Essential hypertension       metFORMIN (GLUCOPHAGE) 1000 MG tablet Take 1 tablet (1,000 mg total) by mouth 2 (two) times daily with meals.  Qty: 180 tablet, Refills: 1    Associated Diagnoses: Type 2 diabetes mellitus with hyperglycemia, with long-term current use of insulin      metoprolol succinate (TOPROL-XL) 50 MG 24 hr tablet Take 1 tablet (50 mg total) by mouth every evening.  Qty: 30 tablet, Refills: 11    Comments: .  Associated Diagnoses: Essential hypertension      mirtazapine (REMERON) 15 MG tablet Take 1 tablet by mouth at bedtime.  Qty: 30 tablet, Refills: 1      montelukast (SINGULAIR) 10 mg tablet Take 10 mg by mouth.      omeprazole (PRILOSEC) 40 MG capsule Take 1 capsule (40 mg total) by mouth once daily.  Qty: 30 capsule, Refills: 11    Comments: Failed omeprazole 20 mg and Nexium 20 mg.  Associated Diagnoses: Gastroesophageal reflux disease, unspecified whether esophagitis present      promethazine (PHENERGAN) 12.5 MG Tab       prucalopride (MOTEGRITY) 2 mg Tab Take 1 tablet (2 mg) by mouth once daily.  Qty: 30 tablet, Refills: 11    Comments: Failed Miralax, Lactulose, Linzess, Amitiza, and Trulance.  Associated Diagnoses: Chronic idiopathic constipation      secukinumab (COSENTYX PEN, 2 PENS,) 150 mg/mL PnIj Inject 300 mg (2 mL) into the skin every 28 days.  Qty: 2 mL, Refills: 6    Associated Diagnoses: Spondyloarthropathy      spironolactone (ALDACTONE) 25 MG tablet Take 1 tablet (25 mg total) by mouth once daily.  Qty: 90 tablet, Refills: 3    Comments: .  Associated Diagnoses: Essential hypertension; Hyperaldosteronism      tiZANidine (ZANAFLEX) 4 MG tablet Take 2 tablets (8 mg total) by mouth every 6 (six) hours as needed (Muscle spasms).  Qty: 240 tablet, Refills: 0    Comments: 30 day supply.  Associated Diagnoses: Muscle pain      traMADoL (ULTRAM) 50 mg tablet Take 1 tablet (50 mg total) by mouth every 6 (six) hours as needed for Pain.  Qty: 28 tablet, Refills: 0    Comments: Quantity prescribed more than 7  "day supply? No      aspirin (ECOTRIN) 81 MG EC tablet Take 1 tablet by mouth daily  Qty: 30 tablet, Refills: 0      BD INSULIN SYRINGE ULTRA-FINE 1/2 mL 30 gauge x 1/2" Syrg Refills: 0      !! blood sugar diagnostic (ONETOUCH ULTRA TEST) Strp 1 each by Misc.(Non-Drug; Combo Route) route 4 (four) times daily. [facility] Glucose Test Strips  Qty: 400 strip, Refills: 3    Associated Diagnoses: Uncontrolled type 2 diabetes mellitus with hyperglycemia      !! blood sugar diagnostic (ONETOUCH ULTRA TEST) Strp Check blood glucose 4 times daily as directed and as needed (dispense insurance preferred brand or patient choice)  Qty: 200 each, Refills: 5    Associated Diagnoses: Type 2 diabetes mellitus with other specified complication, with long-term current use of insulin      blood-glucose sensor (DEXCOM G7 SENSOR) Mariela Use to check bg. Change every 10 days  Qty: 3 each, Refills: 11      estradioL (ESTRACE) 0.01 % (0.1 mg/gram) vaginal cream Place 1 g vaginally twice a week.  Qty: 42.5 g, Refills: 3    Associated Diagnoses: Vaginal atrophy      flash glucose scanning reader (FREESTYLE AMY 2 READER) Misc Use as directed to check blood sugar  Qty: 1 each, Refills: 1    Comments: cash price  Associated Diagnoses: Uncontrolled type 2 diabetes mellitus with hyperglycemia      lancets (ONETOUCH DELICA PLUS LANCET) 30 gauge Misc Use as directed 3 times daily  Qty: 100 each, Refills: 11    Associated Diagnoses: Type 2 diabetes mellitus with hyperglycemia, with long-term current use of insulin      lifitegrast (XIIDRA) 5 % Dpet Place 1 drop into both eyes 2 (two) times daily.  Qty: 60 each, Refills: 12    Associated Diagnoses: Dry eye      !! pen needle, diabetic (BD ULTRA-FINE MINI PEN NEEDLE) 31 gauge x 3/16" Ndle Use one needle 5 times a day  Qty: 200 each, Refills: 11    Associated Diagnoses: Type 2 diabetes mellitus with hyperglycemia, with long-term current use of insulin      !! pen needle, diabetic (BD ULTRA-FINE MINI PEN " "NEEDLE) 31 gauge x 3/16" Ndle Use 5 a day  Qty: 200 each, Refills: 11      pneumoc 20-tatiana conj-dip cr,PF, (PREVNAR-20, PF,) 0.5 mL Syrg injection Inject 0.5 mLs into the muscle.  Qty: 0.5 mL, Refills: 0       !! - Potential duplicate medications found. Please discuss with provider.              Discharge Diagnosis: Cervical radiculopathy [M54.12]  Condition on Discharge: Stable with no complications to procedure   Diet on Discharge: Same as before.  Activity: as per instruction sheet.  Discharge to: Home with a responsible adult.  Follow up: 2-4 weeks       Please call the office at [phone] if you experience any weakness or loss of sensation, fever > 101.5, pain uncontrolled with oral medications, persistent nausea/vomiting/or diarrhea, redness or drainage from the incisions, or any other worrisome concerns. If physician on call was not reached or could not communicate with our office for any reason please go to the nearest emergency department      "

## 2024-01-30 NOTE — H&P
HPI  Patient presenting for Procedure(s) (LRB):  C5/6 IL ROCAEL (N/A)     No health changes since previous encounter    Past Medical History:   Diagnosis Date    Abnormal Pap smear of cervix     HPV genital warts    Anemia     Anxiety     Arthritis     Asthma 10/11/2016    Bipolar 1 disorder     Diabetes mellitus, type 2     Dyslipidemia associated with type 2 diabetes mellitus 2019    General anesthetics causing adverse effect in therapeutic use     Genital warts     GERD (gastroesophageal reflux disease)     Herpes simplex virus (HSV) infection     Hyperlipidemia     Hypertension     Hypertension complicating diabetes 2019    Migraine with aura and without status migrainosus, not intractable 2016    Mild persistent asthma without complication 10/11/2016    Morbid obesity with body mass index (BMI) of 45.0 to 49.9 in adult 2017    Obstructive sleep apnea     ALEN (obstructive sleep apnea)     2L per N/C q HS    Schizoaffective disorder, bipolar type 2019    Seasonal allergic rhinitis due to pollen 2019    Type 2 diabetes mellitus with hyperglycemia, with long-term current use of insulin 2017    Type 2 diabetes mellitus with hyperglycemia, without long-term current use of insulin 2017     Past Surgical History:   Procedure Laterality Date    abcess removed right back      BELT ABDOMINOPLASTY  1996    BREAST SURGERY Bilateral     Reduction    CARPAL TUNNEL RELEASE Bilateral 2023    Procedure: RELEASE, CARPAL TUNNEL;  Surgeon: Neville Roth MD;  Location: [facility];  Service: Orthopedics;  Laterality: Bilateral;  bilateral carpal tunnel release     SECTION      X 2    COLONOSCOPY      COLONOSCOPY N/A 2018    Procedure: colonoscopy for iron deficiency anemia;  Surgeon: Frankie Sanchez MD;  Location: [facility];  Service: Endoscopy;  Laterality: N/A;    COLONOSCOPY N/A 2018    Procedure: COLONOSCOPY;  Surgeon: Frankie Sanchez MD;   Location: [facility] ENDO;  Service: Endoscopy;  Laterality: N/A;    COLONOSCOPY N/A 3/10/2023    Procedure: COLONOSCOPY;  Surgeon: Lalita Montes De Oca MD;  Location: [facility] ENDO;  Service: Endoscopy;  Laterality: N/A;    EPIDURAL STEROID INJECTION INTO CERVICAL SPINE N/A 1/31/2023    Procedure: C6/7 IL ROCAEL RN IV Sedation;  Surgeon: Otto Phillips MD;  Location: [facility] PAIN MGT;  Service: Pain Management;  Laterality: N/A;    ESOPHAGOGASTRODUODENOSCOPY N/A 3/10/2023    Procedure: EGD (ESOPHAGOGASTRODUODENOSCOPY);  Surgeon: Lalita Montes De Oca MD;  Location: [facility] ENDO;  Service: Endoscopy;  Laterality: N/A;    HYSTERECTOMY      w/ BSO; hypermenorrhea    INJECTION OF ANESTHETIC AGENT AROUND MEDIAL BRANCH NERVES INNERVATING CERVICAL FACET JOINT Bilateral 12/19/2023    Procedure: Bilateral C5-7 MBB (diagnostic);  Surgeon: Otto Phillips MD;  Location: [facility] PAIN [facility]GT;  Service: Pain Management;  Laterality: Bilateral;    MOUTH SURGERY  1996    OOPHORECTOMY      hyst/bso, hypermenorrhea    ROBOT-ASSISTED CHOLECYSTECTOMY USING DA DARI XI N/A 1/3/2020    Procedure: XI ROBOTIC CHOLECYSTECTOMY;  Surgeon: Damir Driscoll MD;  Location: [facility] OR;  Service: General;  Laterality: N/A;    TOTAL REDUCTION MAMMOPLASTY      TUBAL LIGATION       Review of patient's allergies indicates:   Allergen Reactions    Codeine Itching    Hydromorphone Other (See Comments)     Can't wake up for long time if taken  Slow to wake up after surgery after receiving    Aleve [naproxen sodium]      Increases BP    Lyrica [pregabalin] Itching    Motrin [ibuprofen]      Increases BP    Neuromuscular blockers, steroidal Hives     some    Latex, natural rubber Rash    Morphine Rash     itching    Norco [hydrocodone-acetaminophen] Itching, Rash and Hallucinations    Seconal [secobarbital sodium] Rash     itching    Tylox [oxycodone-acetaminophen] Rash        No current facility-administered medications on file prior to encounter.     Current Outpatient  Medications on File Prior to Encounter   Medication Sig Dispense Refill    amlodipine-valsartan (EXFORGE)  mg per tablet Take 1 tablet by mouth once daily. 90 tablet 3    atorvastatin (LIPITOR) 20 MG tablet Take 1 tablet (20 mg total) by mouth every evening. 90 tablet 3    DULoxetine (CYMBALTA) 60 MG capsule Take 1 capsule (60 mg total) by mouth 2 (two) times daily. 180 capsule 3    ergocalciferol (VITAMIN D2) 50,000 unit Cap Take 1 capsule twice weekly for 3 weeks then weekly 12 capsule 3    fenofibrate (TRICOR) 145 MG tablet Take 1 tablet (145 mg total) by mouth once daily. 90 tablet 3    fluticasone propionate (FLONASE) 50 mcg/actuation nasal spray 2 sprays (100 mcg total) by Each Nare route once daily. 16 g 11    frovatriptan (FROVA) 2.5 MG tablet Take 1 tablet at onset of acute migraine. May take 1 more tablet 2 hours later if needed. (MAX 2 TABLET PER DAY. MAX 4 TABLETS PER WEEK.) 9 tablet 1    furosemide (LASIX) 20 MG tablet Take 1 tablet (20 mg total) by mouth once daily. 90 tablet 3    glucagon (BAQSIMI) 3 mg/actuation Spry 1 spray by Nasal route as needed (hypoglycemia). For emergency use. May repeat 1 time after 15 minutes 2 each 1    insulin glargine, TOUJEO, (TOUJEO SOLOSTAR U-300 INSULIN) 300 unit/mL (1.5 mL) InPn pen Inject 16 Units into the skin every evening. 3 pen 1    levocetirizine (XYZAL) 5 MG tablet Take 1 tablet (5 mg total) by mouth every evening. 30 tablet 11    magnesium oxide (MAG-OX) 400 mg (241.3 mg magnesium) tablet Take 1 tablet (400 mg total) by mouth once daily. 90 tablet 3    metFORMIN (GLUCOPHAGE) 1000 MG tablet Take 1 tablet (1,000 mg total) by mouth 2 (two) times daily with meals. 180 tablet 1    metoprolol succinate (TOPROL-XL) 50 MG 24 hr tablet Take 1 tablet (50 mg total) by mouth every evening. 30 tablet 11    mirtazapine (REMERON) 15 MG tablet Take 1 tablet by mouth at bedtime. 30 tablet 1    montelukast (SINGULAIR) 10 mg tablet Take 10 mg by mouth.      omeprazole  "(PRILOSEC) 40 MG capsule Take 1 capsule (40 mg total) by mouth once daily. 30 capsule 11    promethazine (PHENERGAN) 12.5 MG Tab       prucalopride (MOTEGRITY) 2 mg Tab Take 1 tablet (2 mg) by mouth once daily. 30 tablet 11    secukinumab (COSENTYX PEN, 2 PENS,) 150 mg/mL PnIj Inject 300 mg (2 mL) into the skin every 28 days. 2 mL 6    spironolactone (ALDACTONE) 25 MG tablet Take 1 tablet (25 mg total) by mouth once daily. 90 tablet 3    tiZANidine (ZANAFLEX) 4 MG tablet Take 2 tablets (8 mg total) by mouth every 6 (six) hours as needed (Muscle spasms). 240 tablet 0    traMADoL (ULTRAM) 50 mg tablet Take 1 tablet (50 mg total) by mouth every 6 (six) hours as needed for Pain. 28 tablet 0    aspirin (ECOTRIN) 81 MG EC tablet Take 1 tablet by mouth daily 30 tablet 0    BD INSULIN SYRINGE ULTRA-FINE 1/2 mL 30 gauge x 1/2" Syrg   0    blood sugar diagnostic (ONETOUCH ULTRA TEST) Strp 1 each by Misc.(Non-Drug; Combo Route) route 4 (four) times daily. [facility] Glucose Test Strips 400 strip 3    blood sugar diagnostic (ONETOUCH ULTRA TEST) Strp Check blood glucose 4 times daily as directed and as needed (dispense insurance preferred brand or patient choice) 200 each 5    blood-glucose sensor (DEXCOM G7 SENSOR) Mariela Use to check bg. Change every 10 days 3 each 11    flash glucose scanning reader (FREESTYLE AMY 2 READER) Misc Use as directed to check blood sugar 1 each 1    lancets (ONETOUCH DELICA PLUS LANCET) 30 gauge Misc Use as directed 3 times daily 100 each 11    lifitegrast (XIIDRA) 5 % Dpet Place 1 drop into both eyes 2 (two) times daily. 60 each 12    pen needle, diabetic (BD ULTRA-FINE MINI PEN NEEDLE) 31 gauge x 3/16" Ndle Use one needle 5 times a day 200 each 11    pneumoc 20-tatiana conj-dip cr,PF, (PREVNAR-20, PF,) 0.5 mL Syrg injection Inject 0.5 mLs into the muscle. 0.5 mL 0    [DISCONTINUED] clonazePAM (KLONOPIN) 1 MG tablet Take 1 tablet by mouth daily 30 tablet 0    [DISCONTINUED] glucagon, human recombinant, " "(GLUCAGON EMERGENCY KIT, HUMAN,) 1 mg SolR Inject 1 mg into the muscle as needed. Emergency use 1 each 1    [DISCONTINUED] valsartan (DIOVAN) 320 MG tablet Take 1 tablet (320 mg total) by mouth once daily. 90 tablet 3    [DISCONTINUED] zolpidem (AMBIEN) 10 mg Tab Take 1 tablet (10 mg total) by mouth every evening. 30 tablet 1        PMHx, PSHx, Allergies, Medications reviewed in epic    ROS negative except pain complaints in HPI    OBJECTIVE:    /75 (BP Location: Right arm, Patient Position: Sitting)   Pulse 78   Temp 97.4 °F (36.3 °C) (Temporal)   Resp 18   Ht 4' 8" (1.422 m)   Wt 82 kg (180 lb 12.4 oz)   SpO2 96%   Breastfeeding No   BMI 40.53 kg/m²     PHYSICAL EXAMINATION:    GENERAL: Well appearing, in no acute distress, alert and oriented x3.  PSYCH:  Mood and affect appropriate.  SKIN: Skin color, texture, turgor normal, no rashes or lesions which will impact the procedure.  CV: RRR with palpation of the radial artery.  PULM: No evidence of respiratory difficulty, symmetric chest rise. Clear to auscultation.  NEURO: Cranial nerves grossly intact.    Plan:    Proceed with procedure as planned Procedure(s) (LRB):  C5/6 IL ROCAEL (N/A)    Otto Phillips MD  01/30/2024            "

## 2024-01-30 NOTE — PROGRESS NOTES
Subjective:    The patient location is: [place] at home  Visit type: Virtual visit with synchronous audio and video  Total time spent with patient:30 mins  This includes face to face time and non-face to face time preparing to see the patient (eg, review of tests), obtaining and/or reviewing separately obtained history, documenting clinical information in the electronic or other health record, independently interpreting results and communicating results to the patient/family/caregiver, or care coordinator.   Each patient to whom he or she provides medical services by telemedicine is:  (1) informed of the relationship between the physician and patient and the respective role of any other health care provider with respect to management of the patient; and (2) notified that he or she may decline to receive medical services by telemedicine and may withdraw from such care at any time.   Patient ID: Yolanda Betts is a 53 y.o. female.    Chief Complaint: Feet pain    History of Present Illness:   Yolanda Betts 53 y.o. female presents today with   See PA student note for details    Past Medical History:   Diagnosis Date    Abnormal Pap smear of cervix     HPV genital warts    Anemia     Anxiety     Arthritis     Asthma 10/11/2016    Bipolar 1 disorder     Diabetes mellitus, type 2     Dyslipidemia associated with type 2 diabetes mellitus 05/13/2019    General anesthetics causing adverse effect in therapeutic use     Genital warts     GERD (gastroesophageal reflux disease)     Herpes simplex virus (HSV) infection     Hyperlipidemia     Hypertension     Hypertension complicating diabetes 05/05/2019    Migraine with aura and without status migrainosus, not intractable 03/21/2016    Mild persistent asthma without complication 10/11/2016    Morbid obesity with body mass index (BMI) of 45.0 to 49.9 in adult 08/03/2017    Obstructive sleep apnea     ALEN (obstructive sleep apnea)     2L per N/C q  "HS    Schizoaffective disorder, bipolar type 04/25/2019    Seasonal allergic rhinitis due to pollen 05/13/2019    Type 2 diabetes mellitus with hyperglycemia, with long-term current use of insulin 12/21/2017    Type 2 diabetes mellitus with hyperglycemia, without long-term current use of insulin 12/21/2017     Family History   Problem Relation Age of Onset    Diabetes Mother     Hyperlipidemia Mother     Hypertension Mother     Asthma Mother     COPD Mother     Glaucoma Mother     Thyroid disease Mother     Anesthesia problems Mother         "almost had a cardiac arrest" , blood clots    Hypertension Father     Hyperlipidemia Father     Cancer Father         Brain, lung, liver, kidney    No Known Problems Sister     Hypertension Brother     Alcohol abuse Brother     Heart disease Maternal Grandmother     Hyperlipidemia Maternal Grandmother     Hypertension Maternal Grandmother     Cataracts Maternal Grandmother     Diabetes Maternal Grandmother     Heart disease Maternal Grandfather     Hyperlipidemia Maternal Grandfather     Hypertension Maternal Grandfather     Glaucoma Maternal Grandfather     Cancer Maternal Grandfather     Cataracts Maternal Grandfather     Macular degeneration Maternal Grandfather     Diabetes Maternal Grandfather     Heart disease Paternal Grandmother     Hyperlipidemia Paternal Grandmother     Hypertension Paternal Grandmother     Cataracts Paternal Grandmother     Heart disease Paternal Grandfather     Hyperlipidemia Paternal Grandfather     Hypertension Paternal Grandfather     Cataracts Paternal Grandfather     Breast cancer Maternal Cousin     Breast cancer Maternal Cousin     Breast cancer Maternal Cousin     Breast cancer Maternal Cousin      Social History     Socioeconomic History    Marital status: Single   Tobacco Use    Smoking status: Never    Smokeless tobacco: Never   Substance and Sexual Activity    Alcohol use: Yes     " Alcohol/week: 0.0 standard drinks of alcohol     Comment: socially  No alcohol 72h prior to sx    Drug use: No    Sexual activity: Yes     Partners: Male     Birth control/protection: Surgical     Comment: hyst   Social History Narrative    Long-term care nurse     Social Determinants of Health     Financial Resource Strain: High Risk (1/18/2024)    Overall Financial Resource Strain (CARDIA)     Difficulty of Paying Living Expenses: Very hard   Food Insecurity: Food Insecurity Present (1/18/2024)    Hunger Vital Sign     Worried About Running Out of Food in the Last Year: Often true     Ran Out of Food in the Last Year: Often true   Transportation Needs: Unmet Transportation Needs (1/18/2024)    PRAPARE - Transportation     Lack of Transportation (Medical): Yes     Lack of Transportation (Non-Medical): Yes   Physical Activity: Inactive (1/18/2024)    Exercise Vital Sign     Days of Exercise per Week: 0 days     Minutes of Exercise per Session: 0 min   Stress: Stress Concern Present (1/18/2024)    [place] [employer] - Occupational Stress Questionnaire     Feeling of Stress : Very much   Social Connections: Socially Isolated (1/18/2024)    Social Connection and Isolation Panel [NHANES]     Frequency of Communication with Friends and Family: Twice a week     Frequency of Social Gatherings with Friends and Family: Never     Attends Holiness Services: More than 4 times per year     Active Member of Clubs or Organizations: No     Attends Club or Organization Meetings: Never     Marital Status:    Housing Stability: High Risk (1/18/2024)    Housing Stability Vital Sign     Unable to Pay for Housing in the Last Year: Yes     Number of Places Lived in the Last Year: 1     Unstable Housing in the Last Year: No     Outpatient Encounter Medications as of 1/30/2024   Medication Sig Dispense Refill    ALPRAZolam (XANAX) 1 MG tablet Take 1 tablet (1 mg total) by mouth 2 (two)  "times daily as needed for Anxiety. 60 tablet 2    amlodipine-valsartan (EXFORGE)  mg per tablet Take 1 tablet by mouth once daily. 90 tablet 3    aspirin (ECOTRIN) 81 MG EC tablet Take 1 tablet by mouth daily 30 tablet 0    atorvastatin (LIPITOR) 20 MG tablet Take 1 tablet (20 mg total) by mouth every evening. 90 tablet 3    BD INSULIN SYRINGE ULTRA-FINE 1/2 mL 30 gauge x 1/2" Syrg   0    blood sugar diagnostic (ONETOUCH ULTRA TEST) Strp 1 each by Misc.(Non-Drug; Combo Route) route 4 (four) times daily. [facility] Glucose Test Strips 400 strip 3    blood sugar diagnostic (ONETOUCH ULTRA TEST) Strp Check blood glucose 4 times daily as directed and as needed (dispense insurance preferred brand or patient choice) 200 each 5    blood-glucose sensor (DEXCOM G7 SENSOR) Mariela Use to check bg. Change every 10 days 3 each 11    DULoxetine (CYMBALTA) 60 MG capsule Take 1 capsule (60 mg total) by mouth 2 (two) times daily. 180 capsule 3    ergocalciferol (VITAMIN D2) 50,000 unit Cap Take 1 capsule twice weekly for 3 weeks then weekly 12 capsule 3    estradioL (ESTRACE) 0.01 % (0.1 mg/gram) vaginal cream Place 1 g vaginally twice a week. 42.5 g 3    fenofibrate (TRICOR) 145 MG tablet Take 1 tablet (145 mg total) by mouth once daily. 90 tablet 3    flash glucose scanning reader (FREESTYLE AMY 2 READER) Misc Use as directed to check blood sugar 1 each 1    fluticasone propionate (FLONASE) 50 mcg/actuation nasal spray 2 sprays (100 mcg total) by Each Nare route once daily. 16 g 11    frovatriptan (FROVA) 2.5 MG tablet Take 1 tablet at onset of acute migraine. May take 1 more tablet 2 hours later if needed. (MAX 2 TABLET PER DAY. MAX 4 TABLETS PER WEEK.) 9 tablet 1    furosemide (LASIX) 20 MG tablet Take 1 tablet (20 mg total) by mouth once daily. 90 tablet 3    glucagon (BAQSIMI) 3 mg/actuation Spry 1 spray by Nasal route as needed (hypoglycemia). For emergency use. May repeat 1 time after 15 minutes 2 each 1 " "   insulin aspart U-100 (NOVOLOG FLEXPEN U-100 INSULIN) 100 unit/mL (3 mL) InPn pen Inject 24 Units into the skin 3 (three) times daily before meals. Use per sliding scale for breakfast and dinner 45 mL 1    insulin glargine, TOUJEO, (TOUJEO SOLOSTAR U-300 INSULIN) 300 unit/mL (1.5 mL) InPn pen Inject 16 Units into the skin every evening. 3 pen 1    lamoTRIgine (LAMICTAL) 200 MG tablet Take 1 tablet (200 mg total) by mouth 2 (two) times daily. 60 tablet 2    lancets (ONETOUCH DELICA PLUS LANCET) 30 gauge Misc Use as directed 3 times daily 100 each 11    levocetirizine (XYZAL) 5 MG tablet Take 1 tablet (5 mg total) by mouth every evening. 30 tablet 11    lifitegrast (XIIDRA) 5 % Dpet Place 1 drop into both eyes 2 (two) times daily. 60 each 12    lurasidone (LATUDA) 80 mg Tab tablet Take 1 tablet (80 mg total) by mouth once daily. 30 tablet 2    magnesium oxide (MAG-OX) 400 mg (241.3 mg magnesium) tablet Take 1 tablet (400 mg total) by mouth once daily. 90 tablet 3    metFORMIN (GLUCOPHAGE) 1000 MG tablet Take 1 tablet (1,000 mg total) by mouth 2 (two) times daily with meals. 180 tablet 1    metoprolol succinate (TOPROL-XL) 50 MG 24 hr tablet Take 1 tablet (50 mg total) by mouth every evening. 30 tablet 11    mirtazapine (REMERON) 15 MG tablet Take 1 tablet by mouth at bedtime. 30 tablet 1    montelukast (SINGULAIR) 10 mg tablet Take 10 mg by mouth.      omeprazole (PRILOSEC) 40 MG capsule Take 1 capsule (40 mg total) by mouth once daily. 30 capsule 11    pen needle, diabetic (BD ULTRA-FINE MINI PEN NEEDLE) 31 gauge x 3/16" Ndle Use one needle 5 times a day 200 each 11    pen needle, diabetic (BD ULTRA-FINE MINI PEN NEEDLE) 31 gauge x 3/16" Ndle Use 5 a day 200 each 11    pneumoc 20-tatiana conj-dip cr,PF, (PREVNAR-20, PF,) 0.5 mL Syrg injection Inject 0.5 mLs into the muscle. 0.5 mL 0    [] polyethylene glycol (COLYTE) 240-22.72-6.72 -5.84 gram SolR Take 4,000 mLs by mouth once. for 1 dose 4000 mL " 0    promethazine (PHENERGAN) 12.5 MG Tab       prucalopride (MOTEGRITY) 2 mg Tab Take 1 tablet (2 mg) by mouth once daily. 30 tablet 11    secukinumab (COSENTYX PEN, 2 PENS,) 150 mg/mL PnIj Inject 300 mg (2 mL) into the skin every 28 days. 2 mL 6    spironolactone (ALDACTONE) 25 MG tablet Take 1 tablet (25 mg total) by mouth once daily. 90 tablet 3    tiZANidine (ZANAFLEX) 4 MG tablet Take 2 tablets (8 mg total) by mouth every 6 (six) hours as needed (Muscle spasms). 240 tablet 0    traMADoL (ULTRAM) 50 mg tablet Take 1 tablet (50 mg total) by mouth every 6 (six) hours as needed for Pain. 28 tablet 0    [DISCONTINUED] clonazePAM (KLONOPIN) 1 MG tablet Take 1 tablet by mouth daily 30 tablet 0    [DISCONTINUED] glucagon, human recombinant, (GLUCAGON EMERGENCY KIT, HUMAN,) 1 mg SolR Inject 1 mg into the muscle as needed. Emergency use 1 each 1    [DISCONTINUED] secukinumab (COSENTYX UNOREADY PEN) 300 mg/2 mL (150 mg/mL) PnIj Inject 300 mg into the skin every 28 days. 2 mL 6    [DISCONTINUED] valsartan (DIOVAN) 320 MG tablet Take 1 tablet (320 mg total) by mouth once daily. 90 tablet 3    [DISCONTINUED] zolpidem (AMBIEN) 10 mg Tab Take 1 tablet (10 mg total) by mouth every evening. 30 tablet 1     Facility-Administered Encounter Medications as of 1/30/2024   Medication Dose Route Frequency Provider Last Rate Last Admin    ondansetron injection 4 mg  4 mg Intravenous Once PRN Otto Phillips MD        [DISCONTINUED] BUPivacaine (PF) 0.25% (2.5 mg/ml) injection    PRN Otto Phillips MD   1 mL at 01/30/24 0835    [DISCONTINUED] dexAMETHasone sodium phos (PF) injection    PRN Otto Phillips MD   10 mg at 01/30/24 0835    [DISCONTINUED] fentaNYL 50 mcg/mL injection    PRN Otto Phillips MD   75 mcg at 01/30/24 0835    [DISCONTINUED] iohexoL (OMNIPAQUE 240) injection    PRN Otto Phillips MD   1 mL at 01/30/24 0835    [DISCONTINUED] midazolam injection    PRN Otto Phillips MD   2 mg  at 01/30/24 0835       Review of Systems   Constitutional:  Negative for activity change and unexpected weight change.   HENT:  Negative for hearing loss, rhinorrhea and trouble swallowing.    Eyes:  Positive for visual disturbance. Negative for discharge.   Respiratory:  Negative for chest tightness and wheezing.    Cardiovascular:  Negative for chest pain and palpitations.   Gastrointestinal:  Positive for constipation. Negative for blood in stool, diarrhea and vomiting.   Endocrine: Negative for polydipsia and polyuria.   Genitourinary:  Negative for difficulty urinating, hematuria and menstrual problem.   Musculoskeletal:  Positive for arthralgias, joint swelling and neck pain.   Neurological:  Positive for weakness and headaches.   Psychiatric/Behavioral:  Positive for dysphoric mood. Negative for confusion.      Objective:      There were no vitals taken for this visit.  Physical Exam  Musculoskeletal:        Feet:        CONSTITUTIONAL: No apparent distress. Appears comfortable. Does not appear acutely ill or septic. Appears adequately hydrated.  CARDIOVASCULAR: No perioral cyanosis  PULMONARY: Breathing unlabored. No retractions Chest expansion grossly normal.  PSYCHIATRIC: Alert and conversant and grossly oriented. Mood is grossly neutral. Affect appropriate. Judgment and insight grossly intact.  NEUROLOGIC: No focal sensory deficits reported.   Results for orders placed or performed during the hospital encounter of 01/30/24   POCT glucose   Result Value Ref Range    POCT Glucose 98 70 - 110 mg/dL     *Note: Due to a large number of results and/or encounters for the requested time period, some results have not been displayed. A complete set of results can be found in Results Review.     Assessment:       1. Plantar fasciitis, bilateral    2. Essential hypertension    3. Bipolar disord, crnt epsd depress, severe, w psych features        Plan:   1. Plantar fasciitis, bilateral  Comments:  new, conservative  therapy, declined injection.  Orders:  -     Ambulatory referral/consult to Physical/Occupational Therapy; Future; Expected date: 02/06/2024    2. Essential hypertension  Comments:  Not controlled per last reading, come to the clinic for BP check, cont aml-valsartan    3. Bipolar disord, crnt epsd depress, severe, w psych features  Comments:  Chronic stable, affect looking good today, still with a therapist  Overview:  Last Assessment & Plan:   Psych: Toño Cooney    Patient is on Cogentin as well as Abilify, Latuda, Klonopin, Cymbalta, Remeron.  We will reach out to patient's psychiatric specialist to ensure these medications are appropriately dosed following surgery.    Last Assessment & Plan:   Formatting of this note might be different from the original.  Psych: Toño Cooney    Patient is on Cogentin as well as Abilify, Latuda, Klonopin, Cymbalta, Remeron.  We will reach out to patient's psychiatric specialist to ensure these medications are appropriately dosed following surgery.          I have reviewed all of the patient's clinical history available in care everywhere and Epic and have utilized this in my evaluation and management recommendations today.      Treatment options and alternatives were discussed with the patient. Patient was given ample time to ask questions. All questions were answered. Voices understanding and acceptance of this advice. Will call back if any further questions or concerns.                Sasha Sorenson MD  [facility],

## 2024-01-30 NOTE — TELEPHONE ENCOUNTER
Patient states she received a steroid injection in her cervical spine today, 01/30/24, [facility]. Patient states c/o hyperglycemia since receiving her steroid injection with use of sliding scale coverage for elevated blood glucose readings at 12:00 PM and 3:00 PM. Patient states her current blood glucose reading is 280 mg/dL and hyperglycemia has not resolved.     Patient received a call from the office of Elizabeth Ceron NP during triage. Patient states she was advised by the office of Elizabeth Rizo NP to Go to ED Now. Triage/assessment ended at this time. Patient advised to contact the [facility] Triage [facility] for any worsening symptoms. Patient states understanding of care advice.     Reason for Disposition   Caller has already spoken with the PCP (or office), and has no further questions    Protocols used: No Contact or Duplicate Contact Call-A-OH

## 2024-01-30 NOTE — DISCHARGE INSTRUCTIONS

## 2024-01-30 NOTE — TELEPHONE ENCOUNTER
Patient called stated that after steroid injection into cervical spine her blood sugars have been spiking. Per patient se administered 22 units of insulin and levels are still elevated. Informed patient that levels may spike due to injection. Denies eating anything to can contribute. Patient informed that I would communicate to Provider. Uploaded recent dexcom and advised that if it continues please report to ER for assessment     ----- Message from Sara Matta sent at 1/30/2024  3:45 PM CST -----  Contact: Yolanda Mathew is calling in regards to her blood sugar is at 280 and claiming and that started after the injection.please call back at .[phone]             Thanks  JERALD

## 2024-01-30 NOTE — PROGRESS NOTES
Subjective     Patient ID: Yolanda Betts is a 53 y.o. female.    Chief Complaint:     HPI  Yolanda Betts is a 52 y/o female with complaints of bilateral foot pain.  Patient thinks she may have plantar fasciitis. Feet are aching and burning especially in her heel.   Would like to go to physical therapy, defers injection.  Educated on exercises she can be doing to help this.  Depression symptoms improving at this time.     Review of Systems   Constitutional:  Negative for activity change and unexpected weight change.   HENT:  Negative for hearing loss, rhinorrhea and trouble swallowing.    Eyes:  Positive for visual disturbance. Negative for discharge.   Respiratory:  Negative for chest tightness and wheezing.    Cardiovascular:  Negative for chest pain and palpitations.   Gastrointestinal:  Positive for constipation. Negative for blood in stool, diarrhea and vomiting.   Endocrine: Negative for polydipsia and polyuria.   Genitourinary:  Negative for difficulty urinating, hematuria and menstrual problem.   Musculoskeletal:  Positive for arthralgias, joint swelling and neck pain.   Neurological:  Positive for weakness and headaches.   Psychiatric/Behavioral:  Positive for dysphoric mood. Negative for confusion.           Objective     Physical Exam       Assessment and Plan     Plantar fasciitis, bilateral  -     Ambulatory referral/consult to Physical/Occupational Therapy; Future; Expected date: 02/06/2024        -     Will try KT tape        ELVA Gamez

## 2024-01-30 NOTE — TELEPHONE ENCOUNTER
----- Message from Katerin Russo sent at 1/30/2024  4:46 PM CST -----  Contact: Yolanda Guerrero is calling to speak to the nurse regarding her blood sugar  329, she stated its urgent and need to know if she should go to the Er, she is freaking out. Please give her a call asap at [phone]    Thanks  LJ

## 2024-01-31 DIAGNOSIS — E78.5 DYSLIPIDEMIA ASSOCIATED WITH TYPE 2 DIABETES MELLITUS: ICD-10-CM

## 2024-01-31 DIAGNOSIS — E11.69 DYSLIPIDEMIA ASSOCIATED WITH TYPE 2 DIABETES MELLITUS: ICD-10-CM

## 2024-01-31 DIAGNOSIS — Z79.4 TYPE 2 DIABETES MELLITUS WITH HYPERGLYCEMIA, WITH LONG-TERM CURRENT USE OF INSULIN: ICD-10-CM

## 2024-01-31 DIAGNOSIS — E78.1 HYPERTRIGLYCERIDEMIA: Chronic | ICD-10-CM

## 2024-01-31 DIAGNOSIS — E11.65 TYPE 2 DIABETES MELLITUS WITH HYPERGLYCEMIA, WITH LONG-TERM CURRENT USE OF INSULIN: ICD-10-CM

## 2024-01-31 DIAGNOSIS — I10 ESSENTIAL HYPERTENSION: Chronic | ICD-10-CM

## 2024-01-31 RX ORDER — ATORVASTATIN CALCIUM 20 MG/1
20 TABLET, FILM COATED ORAL NIGHTLY
Qty: 90 TABLET | Refills: 3 | Status: SHIPPED | OUTPATIENT
Start: 2024-01-31 | End: 2025-01-30

## 2024-01-31 RX ORDER — METFORMIN HYDROCHLORIDE 1000 MG/1
1000 TABLET ORAL 2 TIMES DAILY WITH MEALS
Qty: 180 TABLET | Refills: 1 | Status: SHIPPED | OUTPATIENT
Start: 2024-01-31 | End: 2024-03-07

## 2024-01-31 RX ORDER — FENOFIBRATE 145 MG/1
145 TABLET, FILM COATED ORAL DAILY
Qty: 90 TABLET | Refills: 3 | OUTPATIENT
Start: 2024-01-31 | End: 2024-04-30

## 2024-01-31 NOTE — TELEPHONE ENCOUNTER
Patient called to get status on blood sugars per patient taking injection every 3 hours depending on levels plus meals. Most current reading was 150 mg/dl patient was encouraged to continue monitoring and keep scheduled appt.

## 2024-02-01 ENCOUNTER — TELEPHONE (OUTPATIENT)
Dept: GASTROENTEROLOGY | Facility: CLINIC | Age: 54
End: 2024-02-01
Payer: MEDICAID

## 2024-02-01 ENCOUNTER — PATIENT MESSAGE (OUTPATIENT)
Dept: GASTROENTEROLOGY | Facility: CLINIC | Age: 54
End: 2024-02-01
Payer: MEDICAID

## 2024-02-01 ENCOUNTER — OFFICE VISIT (OUTPATIENT)
Dept: DIABETES | Facility: CLINIC | Age: 54
End: 2024-02-01
Payer: MEDICAID

## 2024-02-01 DIAGNOSIS — E78.5 DYSLIPIDEMIA ASSOCIATED WITH TYPE 2 DIABETES MELLITUS: ICD-10-CM

## 2024-02-01 DIAGNOSIS — I15.2 HYPERTENSION COMPLICATING DIABETES: ICD-10-CM

## 2024-02-01 DIAGNOSIS — E11.69 DYSLIPIDEMIA ASSOCIATED WITH TYPE 2 DIABETES MELLITUS: ICD-10-CM

## 2024-02-01 DIAGNOSIS — E11.65 TYPE 2 DIABETES MELLITUS WITH HYPERGLYCEMIA, WITH LONG-TERM CURRENT USE OF INSULIN: Primary | ICD-10-CM

## 2024-02-01 DIAGNOSIS — Z79.4 TYPE 2 DIABETES MELLITUS WITH HYPERGLYCEMIA, WITH LONG-TERM CURRENT USE OF INSULIN: Primary | ICD-10-CM

## 2024-02-01 DIAGNOSIS — E11.59 HYPERTENSION COMPLICATING DIABETES: ICD-10-CM

## 2024-02-01 PROCEDURE — 4010F ACE/ARB THERAPY RXD/TAKEN: CPT | Mod: CPTII,95,, | Performed by: NURSE PRACTITIONER

## 2024-02-01 PROCEDURE — 1159F MED LIST DOCD IN RCRD: CPT | Mod: CPTII,95,, | Performed by: NURSE PRACTITIONER

## 2024-02-01 PROCEDURE — 3072F LOW RISK FOR RETINOPATHY: CPT | Mod: CPTII,95,, | Performed by: NURSE PRACTITIONER

## 2024-02-01 PROCEDURE — 99214 OFFICE O/P EST MOD 30 MIN: CPT | Mod: 95,,, | Performed by: NURSE PRACTITIONER

## 2024-02-01 PROCEDURE — 1160F RVW MEDS BY RX/DR IN RCRD: CPT | Mod: CPTII,95,, | Performed by: NURSE PRACTITIONER

## 2024-02-01 NOTE — TELEPHONE ENCOUNTER
----- Message from Tania Pfeiffer sent at 2/1/2024 11:22 AM CST -----  Contact: self  [phone]  Patient called in to schedule an appointment but none populated, can you get her scheduled. Please call back  [phone]. Thanks tpw

## 2024-02-01 NOTE — PROGRESS NOTES
Subjective:         Patient ID: Yolanda Betts is a 53 y.o. female.  Patient's current PCP is Sasha Sorenson MD.       Chief Complaint: No chief complaint on file.      HPI  Yolanda Betts is a 53 y.o. Black or  female presenting for a follow up for diabetes. Patient has been diagnosed with diabetes for > 10 years.     Complications related to diabetes:  HTN, Hyperlipidemia, peripheral neuropathy; denies Pancreatitis; denies Gastroparesis; denies DKA; denies Hx/family Hx of MEN2/MTC; reports Frequent UTIs/yeast infections; Bariatric surgery 6/1/21.     Past failed treatment include:  Jardiance- multiple yeast infections; Victoza - GI upset    Blood glucose testing is performed regularly with her Dexcom; BG have been elevated due to steroid. Mainly for lunch    Compliance:The patient reports medication and diet compliance most of the time.       The patient location is: home, [place]  The chief complaint leading to consultation is: DM follow up    Visit type: audiovisual    Face to Face time with patient: 30 minutes of total time spent on the encounter, which includes face to face time and non-face to face time preparing to see the patient (eg, review of tests), Obtaining and/or reviewing separately obtained history, Documenting clinical information in the electronic or other health record, Independently interpreting results (not separately reported) and communicating results to the patient/family/caregiver, or Care coordination (not separately reported). Each patient to whom he or she provides medical services by telemedicine is:  (1) informed of the relationship between the physician and patient and the respective role of any other health care provider with respect to management of the patient; and (2) notified that he or she may decline to receive medical services by telemedicine and may withdraw from such care at any time.         //   , There is no height or weight on file to calculate  BMI.  Her blood sugar in clinic today is:   Lab Results   Component Value Date    POCGLU 78 03/10/2023       Labs reviewed and are noted below.  Her most recent A1C is:  Lab Results   Component Value Date    HGBA1C 6.5 (H) 11/29/2023    HGBA1C 5.6 08/08/2023    HGBA1C 6.3 (H) 01/13/2023     Lab Results   Component Value Date    CPEPTIDE 4.56 02/26/2018     Lab Results   Component Value Date    GLUTAMICACID 0.00 02/26/2018     Glucose   Date Value Ref Range Status   01/11/2024 89 70 - 110 mg/dL Final     Anion Gap   Date Value Ref Range Status   01/11/2024 9 8 - 16 mmol/L Final     eGFR if    Date Value Ref Range Status   03/08/2022 >60.0 >60 mL/min/1.73 m^2 Final     eGFR if non    Date Value Ref Range Status   03/08/2022 >60.0 >60 mL/min/1.73 m^2 Final     Comment:     Calculation used to obtain the estimated glomerular filtration  rate (eGFR) is the CKD-EPI equation.          CURRENT DM MEDICATIONS:     Diabetes Medications               glucagon (BAQSIMI) 3 mg/actuation Spry 1 spray by Nasal route as needed (hypoglycemia). For emergency use. May repeat 1 time after 15 minutes    insulin aspart U-100 (NOVOLOG FLEXPEN U-100 INSULIN) 100 unit/mL (3 mL) InPn pen Inject 24 Units into the skin 3 (three) times daily before meals. Use per sliding scale for breakfast and dinner    Breakfast - taking 4 units plus ss  Lunch - taking 22 units , no ss  Dinner- taking 4 units plus ss    Recently added correction scale below    insulin glargine, TOUJEO, (TOUJEO SOLOSTAR U-300 INSULIN) 300 unit/mL (1.5 mL) InPn pen Inject 16 Units into the skin every evening. Taking 40 units    metFORMIN (GLUCOPHAGE) 1000 MG tablet      Mounjaro Take 1 tablet (1,000 mg total) by mouth 2 (two) times daily with meals.      7.5 mg (pt PCP discontinued due to constipation, pt is taking the medication)                   Diabetes Management Status    Statin: Taking  ACE/ARB: Taking    Screening or Prevention Patient's  value Goal Complete/Controlled?   HgA1C Testing and Control   Lab Results   Component Value Date    HGBA1C 6.5 (H) 11/29/2023      Annually/Less than 8% Yes   Lipid profile : 08/08/2023 Annually Yes   LDL control Lab Results   Component Value Date    LDLCALC 43.4 (L) 08/08/2023    Annually/Less than 100 mg/dl  Yes   Nephropathy screening Lab Results   Component Value Date    LABMICR 22.0 12/11/2023     Lab Results   Component Value Date    PROTEINUA Trace (A) 01/23/2024    Annually Yes   Blood pressure BP Readings from Last 1 Encounters:   01/30/24 (!) 145/85    Less than 140/90 Yes   Dilated retinal exam : 12/20/2023 Annually Yes   Foot exam   : 02/16/2023 Annually Yes     Review of Systems   Constitutional:  Negative for activity change and appetite change.   Gastrointestinal:  Positive for constipation (pt reports that symptoms are no worse than usual). Negative for abdominal pain, nausea and vomiting.        Hx of IBS, GERD   Endocrine: Positive for polydipsia. Negative for polyphagia and polyuria.   Musculoskeletal:  Positive for arthralgias.   Neurological:  Negative for syncope and weakness.        Neuropathy   Psychiatric/Behavioral:  Negative for confusion.          Objective:      Physical Exam  Constitutional:       General: She is not in acute distress.     Appearance: She is not ill-appearing.   Neck:      Thyroid: Mass: neg of self exam.   Neurological:      Mental Status: She is alert.   Psychiatric:         Speech: Speech normal.         Assessment:       1. Type 2 diabetes mellitus with hyperglycemia, with long-term current use of insulin    2. Dyslipidemia associated with type 2 diabetes mellitus    3. Hypertension complicating diabetes                    Plan:   Type 2 diabetes mellitus with hyperglycemia, with long-term current use of insulin    Dyslipidemia associated with type 2 diabetes mellitus    Hypertension complicating diabetes                 PLAN:   - Condition: uncontrolled    -  Monitor blood glucose 4x daily. Goals reviewed  - She is working with the RD  - BP and LDL- Recommended lifestyle modifications for management. Encouraged healthy low fat, low carb diet and increase physical activity  - Medication Changes: Continue Toujeo 40 units (titrate 2 units every 3 days if needed), Continue Metformin, Continue Novolog sliding scales, increase lunch dose to 24 units; Continue Mounjaro 7 mg - pending GI evaluation, pt will schedule appt, she will also get the xray ordered by her PCP  - Constipation is overall controlled with current regimen - she understands the risk associated with GLP-1s  - The patient was explained the above plan and given opportunity to ask questions.  She understands, chooses and consents to this plan and accepts all the risks, which include but are not limited to the risks mentioned above.   - Labs ordered as above  - Nurse visit:  deferred    - Follow up: 1 month , 2 and 3 months    Daily ss  If blood pre-meal sugar is  take 4 units of Novolog;  If blood pre-meal sugar is 151-200 add +2 units of Novolog;  If blood pre-meal sugar is 201-250 add +4 units of Novolog;  If blood pre-meal sugar is 251-300 add +6 units of Novolog;  If blood pre-meal sugar is 301-350 add  +8 units of Novolog;  If blood pre-meal sugar is 351-400+ add  +10 units of Novolog;      Correction scale (for steroid use) :  Novolog every 3 hours using correction scale outside of mealtime.  -200: 2 units  -250: 4 units  -300: 6 units  -350: 8 units  BG greater than 350: 10 units      A total of 30 minutes was spent in face to face time, of which over 50% was spent in counseling patient on disease process, complications, treatment, and side effects of medications.

## 2024-02-02 ENCOUNTER — HOSPITAL ENCOUNTER (OUTPATIENT)
Dept: RADIOLOGY | Facility: HOSPITAL | Age: 54
Discharge: HOME | End: 2024-02-02
Attending: FAMILY MEDICINE
Payer: MEDICAID

## 2024-02-02 DIAGNOSIS — K59.09 CHRONIC CONSTIPATION: ICD-10-CM

## 2024-02-02 PROCEDURE — 74019 RADEX ABDOMEN 2 VIEWS: CPT | Mod: TC

## 2024-02-02 PROCEDURE — 74019 RADEX ABDOMEN 2 VIEWS: CPT | Mod: 26,,, | Performed by: RADIOLOGY

## 2024-02-04 ENCOUNTER — NURSE TRIAGE (OUTPATIENT)
Dept: ADMINISTRATIVE | Facility: CLINIC | Age: 54
End: 2024-02-04
Payer: MEDICAID

## 2024-02-04 NOTE — TELEPHONE ENCOUNTER
"Call taken from [employer] board. Yolanda states "my blood sugar won't stay hi. I got it up to 80 but before that it was 57. Current symptoms include shaking, dizzy & urinating a lot."  Eating rice, fruit in heavy syrup, crackers, trail mix & pretzels with chocolate. Advised pt per triage protocol to go to nearest ED now for physician stepan. Instructed to call  now if no immediate . V/u. Pt refusing care advice. Triager explained risk involved with pt decision to delay care could be fatal. Pt v/u. Triager reiterated care advice. Pt demanding to speak to provider on call. Informed caller of brief hold. Spoke to Dr. Josselyn Martinez, on call Diabetes Management provider, agrees to speak to pt. Yolanda successfully connected to Dr. Martinez for further care advice.       Reason for Disposition   [1] Low blood sugar symptoms persist > 30 minutes AND [2] using low blood sugar Care Advice    Additional Information   Negative: Unconscious or difficult to awaken   Negative: Seizure occurs   Negative: Acting confused (e.g., disoriented, slurred speech)   Negative: Very weak (e.g., can't stand)   Negative: Sounds like a life-threatening emergency to the triager   Negative: [1] Vomiting AND [2] signs of dehydration (e.g., very dry mouth, lightheaded, dark urine)    Protocols used: Diabetes - Low Blood Sugar-A-    "

## 2024-02-05 ENCOUNTER — TELEPHONE (OUTPATIENT)
Dept: DIABETES | Facility: CLINIC | Age: 54
End: 2024-02-05
Payer: MEDICAID

## 2024-02-05 ENCOUNTER — TELEPHONE (OUTPATIENT)
Dept: PRIMARY CARE CLINIC | Facility: CLINIC | Age: 54
End: 2024-02-05
Payer: MEDICAID

## 2024-02-05 ENCOUNTER — PATIENT MESSAGE (OUTPATIENT)
Dept: PRIMARY CARE CLINIC | Facility: CLINIC | Age: 54
End: 2024-02-05

## 2024-02-05 DIAGNOSIS — E11.65 TYPE 2 DIABETES MELLITUS WITH HYPERGLYCEMIA, WITH LONG-TERM CURRENT USE OF INSULIN: Primary | ICD-10-CM

## 2024-02-05 DIAGNOSIS — Z79.4 TYPE 2 DIABETES MELLITUS WITH HYPERGLYCEMIA, WITH LONG-TERM CURRENT USE OF INSULIN: Primary | ICD-10-CM

## 2024-02-05 RX ORDER — LANOLIN ALCOHOL/MO/W.PET/CERES
400 CREAM (GRAM) TOPICAL DAILY
Qty: 90 TABLET | Refills: 0 | Status: SHIPPED | OUTPATIENT
Start: 2024-02-05 | End: 2024-05-06

## 2024-02-05 NOTE — TELEPHONE ENCOUNTER
Low BG Friday night. No insulin since Friday night. Currently 108. Discussed hypoglycemia protocol. Pt will be set up for pump eval.( Preferably OP5)      Diabetes Medications               glucagon (BAQSIMI) 3 mg/actuation Spry 1 spray by Nasal route as needed (hypoglycemia). For emergency use. May repeat 1 time after 15 minutes    insulin aspart U-100 (NOVOLOG FLEXPEN U-100 INSULIN) 100 unit/mL (3 mL) InPn pen Inject 24 Units into the skin 3 (three) times daily before meals. Use per sliding scale for breakfast and dinner Continue to hold    insulin glargine, TOUJEO, (TOUJEO SOLOSTAR U-300 INSULIN) 300 unit/mL (1.5 mL) InPn pen Inject 16 Units into the skin every evening. Continue to hold    metFORMIN (GLUCOPHAGE) 1000 MG tablet Take 1 tablet (1,000 mg total) by mouth 2 (two) times daily with meals. Reduce to 1 tab if low BG continue

## 2024-02-05 NOTE — TELEPHONE ENCOUNTER
----- Message from Teetee Gamez sent at 10/17/2019  2:53 PM CDT -----  Contact: pt  Pt is calling the staff regarding when sitting up the has a swiming in the head the pt blood Pressure is still elevated .last night 168/108.     Pt call back [phone]  Thanks   Pt refusing PO meds at this time d/t vomiting. Pt has not vomited in the ED. Zofran given IV per order. ED resident Dr Mills made aware.     Elin Tirado RN  02/05/24 [id number]

## 2024-02-05 NOTE — PROGRESS NOTES
Abnormal xray: Abdominal xray shows possible inflammation of the small intestine which can be caused by viral infection or bacterial infection. Let us know if you are still having symptoms..

## 2024-02-05 NOTE — TELEPHONE ENCOUNTER
Returned the call to inform the patient to return back to the previous dose.        ----- Message from Analisa Lakhani sent at 2/5/2024  7:56 AM CST -----  Contact: Pt  [phone]  [email or web address]     Patient is calling for Medical Advice regarding: Blood sugar levels running 80 and lower    How long has patient had these symptoms: Friday 2/2/2024    Pharmacy name and phone#:   [facility]  [address]  [place] [address]  Phone: [phone] Fax: [phone]    Would like response via [employer]:  Call back    Comments: Patient is extremely tired.    Please call and advise.    Thank You

## 2024-02-05 NOTE — TELEPHONE ENCOUNTER
----- Message from Milagros Bonilla sent at 2/5/2024  7:52 AM CST -----  Contact: pt  Pt is calling in regard to her blood sugar being 57 or lower since Friday nite.  Please call her back at [phone]  thanks/mpd

## 2024-02-06 ENCOUNTER — CLINICAL SUPPORT (OUTPATIENT)
Dept: DIABETES | Facility: CLINIC | Age: 54
End: 2024-02-06
Payer: MEDICAID

## 2024-02-06 VITALS — WEIGHT: 183.44 LBS | BODY MASS INDEX: 41.12 KG/M2

## 2024-02-06 DIAGNOSIS — Z79.4 TYPE 2 DIABETES MELLITUS WITH HYPERGLYCEMIA, WITH LONG-TERM CURRENT USE OF INSULIN: ICD-10-CM

## 2024-02-06 DIAGNOSIS — E11.65 TYPE 2 DIABETES MELLITUS WITH HYPERGLYCEMIA, WITH LONG-TERM CURRENT USE OF INSULIN: ICD-10-CM

## 2024-02-06 PROCEDURE — 99214 OFFICE O/P EST MOD 30 MIN: CPT | Mod: PBBFAC | Performed by: DIETITIAN, REGISTERED

## 2024-02-06 PROCEDURE — G0108 DIAB MANAGE TRN  PER INDIV: HCPCS | Mod: PBBFAC | Performed by: DIETITIAN, REGISTERED

## 2024-02-06 PROCEDURE — 99999 PR PBB SHADOW E&M-EST. PATIENT-LVL IV: CPT | Mod: PBBFAC,,, | Performed by: DIETITIAN, REGISTERED

## 2024-02-06 PROCEDURE — 99999PBSHW PR PBB SHADOW TECHNICAL ONLY FILED TO HB: Mod: PBBFAC,,,

## 2024-02-06 NOTE — PROGRESS NOTES
Diabetes Care Specialist Progress Note  Author: Yara Sanchez RD, CDE  Date: 2/6/2024    Program Intake  Reason for Diabetes Program Visit:: Intervention (DM referral 2/5/24 Elizabeth Ceron NP)  Type of Intervention:: Individual  Education: Insulin Pump Evaluation  Current diabetes risk level:: high  In the last 12 months, have you:: been admitted to a hospital  Was the ER or hospital admission related to diabetes?: No    Lab Results   Component Value Date    HGBA1C 6.5 (H) 11/29/2023       Clinical    Weight: 83.2 kg (183 lb 6.8 oz)       Body mass index is 41.12 kg/m².    Problem Review  Active comorbidities affecting diabetes self-care.:  (HTN, HLD, Obesity, Polyneuropathy, Schizoaffective Bipolar type, NAFLD, IBS, GERD. Bariatric sleeve gastrectomy 6/1/21, Vit D defn, Iron defn anemia.)    Clinical Assessment  Current Diabetes Treatment:  (Toujeo 40units daily. Mounjaro 7.5mg weekly. Metformin 1000mg 1tab twice daily. Novolog acb 4units, acl 24units, acd 4units +s/s. Of note, pt currently holding all DM medications except Metformin 1000mg 1tab daily due hypoglycemia patterns.)  Have you ever experienced hypoglycemia (low blood sugar)?: yes  In the last month, how often have you experienced low blood sugar?: more than once a day  Are you able to tell when your blood sugar is low?: Yes (Dexcom alerts)  What symptoms do you experience?: Shaky, Irritable  Have you ever been hospitalized because your blood sugar was too low?: no  How do you treat hypoglycemia (low blood sugar)?: 1/2 can soda/fruit juice  Have you ever experienced hyperglycemia (high blood sugar)?: yes  In the last month, how often have you experienced high blood sugar?: once every other week  Are you able to tell when your blood sugar is high?: Yes (Dexcom alerts)  Have you ever been hospitalized because your blood sugar was high?: no (none recent)    Dexcom reports reviewed w/ pt and saved media.        Medication Information  How many days a  week do you miss your medications?: Never  Do you sometimes have difficulty refilling your medications?: No  Medication adherence impacting ability to self-manage diabetes?: No    Labs  Do you have regular lab work to monitor your medications?: Yes  Type of Regular Lab Work: A1c, Cholesterol, Microalbumin, BMP  Where do you get your labs drawn?: [facility]  Lab Compliance Barriers: No    Nutritional Status  Meal Plan 24 Hour Recall:  (Pt reports several small meals throughout day with early satiety due s/p bariatric surgery. She appears to obtain adequate protein, fluid intake. No longer using MR parsih. Pt is able to demonstrate accurate carb ctg using clinic examples.)  Meal Plan 24 Hour Recall - Breakfast: egg, sausage, 1 tortilla  Meal Plan 24 Hour Recall - Lunch: chix w/ lima beans ~1/4c and rice ~1/4c  Meal Plan 24 Hour Recall - Dinner: chix or steak w/ greens ~1/2c  Meal Plan 24 Hour Recall - Snack: marco: mostly water  Dentation:: Intact  Recent Changes in Weight: Weight Gain (~10lbs increase since 9/23; net decrease ~24lbs since 5/21)  Current nutritional status an area of need that is impacting patient's ability to self-manage diabetes?: No    Additional Social History    Support  Does anyone support you with your diabetes care?: yes  Who supports you?: self  Is Support an area impacting ability to self-manage diabetes?: No    Access to Mass Media & Technology  Does the patient have access to any of the following devices or technologies?: Smart phone, Internet Access  Media or technology needs impacting ability to self-manage diabetes?: No    Cognitive/Behavioral Health  Alert and Oriented: Yes  Difficulty Thinking: No  Requires Prompting: No  Requires assistance for routine expression?: No  Cognitive or behavioral barriers impacting ability to self-manage diabetes?: No    Culture/Orthodoxy  Culture or Oriental orthodox beliefs that may impact ability to access healthcare: No    Communication  Language preference:  English  Hearing Problems: No  Vision Problems: Yes  Vision problem type:: Decreased Vision  Vision Assistance: Glasses  Communication needs impacting ability to self-manage diabetes?: No    Health Literacy  Preferred Learning Method: Face to Face, Demonstration, Hands On  How often do you need to have someone help you read instructions, pamphlets, or written material from your doctor or pharmacy?: Never  Health literacy needs impacting ability to self-manage diabetes?: No    Diabetes Self-Management Skills Assessment    Medications  Patient is able to describe current diabetes management routine.: yes  Diabetes management routine:: diet, insulin, oral medications, injectable medications  Patient is able to identify current diabetes medications, dosages, and appropriate timing of medications.: yes  Patient understands the purpose of the medications taken for diabetes.: yes  Patient reports problems or concerns with current medication regimen.: yes (Pt in clinic for pre-pump assessment.)  Medication regimen problems/concerns:: concerned about side effects  Medication Skills Assessment Completed:: Yes  Assessment indicates:: Knowledge deficit, Instruction Needed  Area of need?: Yes    Assessment Summary and Plan  Based on today's diabetes care assessment, the following areas of need were identified:          2/6/2024    12:01 AM   Social   Support No   Access to [employer] No   Cognitive/Behavioral Health No   Culture/Temple No   Communication No   Health Literacy No          2/6/2024    12:01 AM   Clinical   Medication Adherence No   Lab Compliance No   Nutritional Status No          2/6/2024    12:01 AM   Diabetes Self-Management Skills   Medication Yes - see care plan      Today's interventions were provided through individual discussion, instruction, and written materials were provided.    Patient verbalized understanding of instruction and written materials.  Pt was able to return back demonstration of  instructions today. Patient understood key points, needs reinforcement and further instruction.     Diabetes Self-Management Care Plan:  Today's Diabetes Self-Management Care Plan was developed with Yolanda's input. Yolanda has agreed to work toward the following goal(s) to improve his/her overall diabetes control.      Care Plan: Diabetes Management   Updates made since 1/7/2024 12:00 AM        Problem: Medications         Goal: Patient Agrees to take Diabetes Medication(s) as prescribed.    Start Date: 2/6/2024   Expected End Date: 2/5/2025   Priority: High   Barriers: No Barriers Identified   Note:    Discussed pre-pump topics:   Pump options w/ compatible CGM: Medtronic using Guardian, OmniPod and Tandem using Dexcom G6  Common terms: basal/bolus insulin, IC, ISF, TBS, IOB  Pros/cons pump therapy, supplies needed, frequency of changing infusion sets and cartridges/pods, and backup pump plan   Allowed pt to see demo pumps and example infusion set    Reviewed need for carb counting accuracy prior to pump initiation. Provided carb counting training using food lists, food label from Diabetes Management Guide. Also, encouraged pt to download and use [facility] [employer] gill for additional support.     Pt demonstrated understanding carb ctg using clinic examples and would like to proceed w/ OmniPod5 and Dexcom G6. She agrees to contact clinic for training appt once devices received. Updates forwarded to provider Abner and encouraged upcoming provider visit for medical mgmt.          Task: Reviewed with patient all current diabetes medications and provided basic review of the purpose, dosage, frequency, side effects, and storage of both oral and injectable diabetes medications. Completed 2/6/2024        Task: Discussed guidelines for preventing, detecting and treating hypoglycemia and hyperglycemia and reviewed the importance of meal and medication timing with diabetes mediations for prevention of hypoglycemia and maximum drug  benefit. Completed 2/6/2024          Follow Up Plan   Pt will contact clinic for device training once Omni5 & Dexcom G6 supplies arrive.    Today's care plan and follow up schedule was discussed with patient.  Yolanda verbalized understanding of the care plan, goals, and agrees to follow up plan.        The patient was encouraged to communicate with his/her health care provider/physician and care team regarding his/her condition(s) and treatment.  I provided the patient with my contact information today and encouraged to contact me via phone or [facility] as needed.     Length of Visit   Total Time: 60 Minutes

## 2024-02-06 NOTE — Clinical Note
Bebeto Johnson, I met w/ Ms Yolanda today. Showed her all pump options, and she'd like to proceed w/ Omni5 and Dexcom G6 for automated feature. She did well with carb ctg review using examples in clinic today. She is scheduled to see you this Thurs. Thank you, Yara

## 2024-02-07 ENCOUNTER — TELEPHONE (OUTPATIENT)
Dept: PRIMARY CARE CLINIC | Facility: CLINIC | Age: 54
End: 2024-02-07
Payer: MEDICAID

## 2024-02-07 NOTE — TELEPHONE ENCOUNTER
----- Message from [employer] sent at 2/7/2024 12:16 PM CST -----  Contact: [phone] Patient  Pt is calling in regards to the message sent to her from Dr Sorenson. Pt stated she is still constipated and in pain. Please call and advise. Thank you.

## 2024-02-07 NOTE — TELEPHONE ENCOUNTER
Returned call to patient in regards to message in regards to message. Patient states from x -ray result she wanted to inform provider she is still having digestion pain, liquid bowel (diarrhea), bloating. Informed pt message has been sent to provider to advise. She will receive a call with update. Thank you for choosing [facility].

## 2024-02-08 ENCOUNTER — OFFICE VISIT (OUTPATIENT)
Dept: DIABETES | Facility: CLINIC | Age: 54
End: 2024-02-08
Payer: MEDICAID

## 2024-02-08 ENCOUNTER — TELEPHONE (OUTPATIENT)
Dept: PSYCHIATRY | Facility: CLINIC | Age: 54
End: 2024-02-08
Payer: MEDICAID

## 2024-02-08 DIAGNOSIS — E11.69 TYPE 2 DIABETES MELLITUS WITH OTHER SPECIFIED COMPLICATION, UNSPECIFIED WHETHER LONG TERM INSULIN USE: Primary | ICD-10-CM

## 2024-02-08 PROCEDURE — 1159F MED LIST DOCD IN RCRD: CPT | Mod: CPTII,95,, | Performed by: NURSE PRACTITIONER

## 2024-02-08 PROCEDURE — 3072F LOW RISK FOR RETINOPATHY: CPT | Mod: CPTII,95,, | Performed by: NURSE PRACTITIONER

## 2024-02-08 PROCEDURE — 4010F ACE/ARB THERAPY RXD/TAKEN: CPT | Mod: CPTII,95,, | Performed by: NURSE PRACTITIONER

## 2024-02-08 PROCEDURE — 1160F RVW MEDS BY RX/DR IN RCRD: CPT | Mod: CPTII,95,, | Performed by: NURSE PRACTITIONER

## 2024-02-08 PROCEDURE — 99499 UNLISTED E&M SERVICE: CPT | Mod: 95,,, | Performed by: NURSE PRACTITIONER

## 2024-02-08 NOTE — PROGRESS NOTES
Established Patient - Audio Only Telehealth Visit     The patient location is: home, [place]  The chief complaint leading to consultation is: DM follow up  Visit type: Virtual visit with audio only (telephone)  Total time spent with patient: 20 min       The reason for the audio only service rather than synchronous audio and video virtual visit was related to technical difficulties or patient preference/necessity.     Each patient to whom I provide medical services by telemedicine is:  (1) informed of the relationship between the physician and patient and the respective role of any other health care provider with respect to management of the patient; and (2) notified that they may decline to receive medical services by telemedicine and may withdraw from such care at any time. Patient verbally consented to receive this service via voice-only telephone call.       HPI:     Yolanda Betts is a 53 y.o. Black or  female presenting for a follow up for diabetes. Patient has been diagnosed with diabetes for > 10 years.      Complications related to diabetes:  HTN, Hyperlipidemia, peripheral neuropathy; denies Pancreatitis; denies Gastroparesis; denies DKA; denies Hx/family Hx of MEN2/MTC; reports Frequent UTIs/yeast infections; Bariatric surgery 6/1/21.      Past failed treatment include:  Jardiance- multiple yeast infections; Victoza - GI upset     Blood glucose testing is performed regularly with her Dexcom; BG have been low (without insulin or Mounjaro, she is only taking Metformin 1 tab)     Compliance:The patient reports medication and diet compliance most of the time.      Assessment and plan:      Type 2 diabetes mellitus              Diabetes Medications               glucagon (BAQSIMI) 3 mg/actuation Spry 1 spray by Nasal route as needed (hypoglycemia). For emergency use. May repeat 1 time after 15 minutes    insulin aspart U-100 (NOVOLOG FLEXPEN U-100 INSULIN) 100 unit/mL (3 mL) InPn pen Inject 24  Units into the skin 3 (three) times daily before meals. Use per sliding scale for breakfast and dinner CONTINUE HOLD    insulin glargine, TOUJEO, (TOUJEO SOLOSTAR U-300 INSULIN) 300 unit/mL (1.5 mL) InPn pen Inject 16 Units into the skin every evening CONTINUE TO HOLD    metFORMIN (GLUCOPHAGE) 1000 MG tablet Take 1 tablet (1,000 mg total) by mouth 2 (two) times daily with meals. HOLD                       PLAN:   - Condition: uncontrolled    - Monitor blood glucose 4x daily. Goals reviewed. She understands the hypoglycemia protocol   - Diet- recommended small meals to avoid possible reactive hypoglycemia   - BP and LDL- Recommended lifestyle modifications for management. Encouraged healthy low fat, low carb diet and increase physical activity  - Medication Changes: HOLD all insulin, Stop Metformin-- I may resume at a lower dose is BG rise,  HOLD Mounjaro due to GI issues ad low BG - pending GI evaluation  - Omnipod on hold due to low BG  - The patient was explained the above plan and given opportunity to ask questions.  She understands, chooses and consents to this plan and accepts all the risks, which include but are not limited to the risks mentioned above.   - Labs ordered as above  - Nurse visit:  deferred    - Follow up: 1,2 and 3 months - I will review her dexcom in 1 week             This service was not originating from a related E/M service provided within the previous 7 days nor will  to an E/M service or procedure within the next 24 hours or my soonest available appointment.  Prevailing standard of care was able to be met in this audio-only visit.

## 2024-02-08 NOTE — TELEPHONE ENCOUNTER
----- Message from Marima Young, PhD, MPAP sent at 2/8/2024  2:55 PM CST -----  Contact: Yolanda  Please see if we can get her in soon with me--call her to confirm. Thanks    ----- Message -----  From: Jolly Moya LPN  Sent: 2/8/2024   2:49 PM CST  To: Mariam Young, PhD, DEYSI    I called her and she stated you need to do a medication adjustment because she is very depressed. I asked her if she has been to an IOP and she stated yes and you know she has.   ----- Message -----  From: Mariam Young, PhD, LANAP  Sent: 2/8/2024   2:30 PM CST  To: Jolly Moya LPN    Please call her--she had been in an IOP?    ----- Message -----  From: Jolly Moya LPN  Sent: 2/8/2024   1:56 PM CST  To: Mariam Young, PhD, DEYSI      ----- Message -----  From: Marty Oliver  Sent: 2/8/2024  11:48 AM CST  To: [facility] Clinical Staff; [facility] ANDERSON Staff    Patient is requesting a call back concerning medication and severe depression. Please call patient back at [phone] .            Thanks

## 2024-02-09 ENCOUNTER — CLINICAL SUPPORT (OUTPATIENT)
Dept: REHABILITATION | Facility: HOSPITAL | Age: 54
End: 2024-02-09
Attending: FAMILY MEDICINE
Payer: MEDICAID

## 2024-02-09 DIAGNOSIS — R68.89 DECREASED STRENGTH, ENDURANCE, AND MOBILITY: ICD-10-CM

## 2024-02-09 DIAGNOSIS — Z74.09 DECREASED STRENGTH, ENDURANCE, AND MOBILITY: ICD-10-CM

## 2024-02-09 DIAGNOSIS — M79.671 BILATERAL FOOT PAIN: Primary | ICD-10-CM

## 2024-02-09 DIAGNOSIS — M25.672 DECREASED RANGE OF MOTION OF BOTH ANKLES: ICD-10-CM

## 2024-02-09 DIAGNOSIS — M25.671 DECREASED RANGE OF MOTION OF BOTH ANKLES: ICD-10-CM

## 2024-02-09 DIAGNOSIS — M72.2 PLANTAR FASCIITIS, BILATERAL: ICD-10-CM

## 2024-02-09 DIAGNOSIS — M79.672 BILATERAL FOOT PAIN: Primary | ICD-10-CM

## 2024-02-09 DIAGNOSIS — R53.1 DECREASED STRENGTH, ENDURANCE, AND MOBILITY: ICD-10-CM

## 2024-02-09 PROCEDURE — 97162 PT EVAL MOD COMPLEX 30 MIN: CPT | Performed by: PHYSICAL THERAPIST

## 2024-02-09 PROCEDURE — 97110 THERAPEUTIC EXERCISES: CPT | Performed by: PHYSICAL THERAPIST

## 2024-02-09 NOTE — PATIENT INSTRUCTIONS
Toe Series - Toe Yoga        Sit with knee stacked above ankle. Maintain the ball of the foot and heel on the floor the entire exercise.    1) Lift the big toe, keeping the little toes planted on the floor. Hold for 5 seconds  2) Lift the little toes, keeping the big toe planted on the floor. Hold for 5 seconds  3) Time yourself for 3 minutes, alternating positions 1 & 2 back and forth throughout that time.      Toe Series - Abduction/Adduction          Sit with knee stacked above ankle    1) Abduction - Spread toes as far apart from each other as possible. The toes may lift off the floor. Hold for seconds  2) Adduction - Push toes together. Hold for 5 seconds  3) Time yourself for 3 minutes, alternating positions 1 & 2 back and forth throughout that time.      Toe Series - Flexion/Extension        Sit with knee stacked above ankle    1) Flexion - Curl toes down toward floor. Hold for 5 seconds  2) Extension - Lift toes up toward ceiling. Hold for 5 seconds  3) Time yourself for 3 minutes, alternating positions 1 & 2 back and forth throughout that time.  4) Place towel roll under ball of foot, but keep heel on the ground.      CALF STRETCH WITH TOWEL - GASTROCNEMIUS        While in a seated position, place a towel around the ball of your foot and pull your ankle back until a stretch is felt on your calf area.    Keep your knee in a straightened position during the stretch.    Hold stretch for 30 seconds and repeat 3 times on each side, 2 times per day       CALF STRETCH WITH TOWEL - SOLEUS        While in a seated position, place a towel around the ball of your foot and pull your ankle back until a stretch is felt on your calf area.    Keep your knee in a bent position during the stretch.    Hold stretch for 30 seconds and repeat 3 times on each side, 2 times per day         Ankle Alphabet        Draw the alphabet with your foot. Concentrate on moving your ankle while maintaining a stable knee. Draw the alphabet in  upper and lower case, one time each.      ANKLE CIRCLES        Move your ankle in a circular clockwise pattern for 2 sets of 10 repetitions and then repeat in the reverse counterclockwise direction for 2 sets of 10 repetitions.  **Perform activity with foot hanging off of a surface (as shown in exercise above) if possible.       *All exercises listed above obtained from [email or web address]

## 2024-02-10 ENCOUNTER — DOCUMENTATION ONLY (OUTPATIENT)
Dept: INTERNAL MEDICINE | Facility: CLINIC | Age: 54
End: 2024-02-10
Payer: MEDICAID

## 2024-02-10 ENCOUNTER — NURSE TRIAGE (OUTPATIENT)
Dept: ADMINISTRATIVE | Facility: CLINIC | Age: 54
End: 2024-02-10
Payer: MEDICAID

## 2024-02-10 NOTE — TELEPHONE ENCOUNTER
Pt calling and c/o blood sugar 240 this am and was recently taken off medication for low blood sugar. Pt has been off for one week and this am she took 4 units of insulin. Pt triaged and care advice is home care but insist on speaking to MD on call to see what instructions she needs to do and how to treat if continues to go up. I reached out to on call MD Dr ROBERTH Jaquez and he will give the pt a call.                  Reason for Disposition   [1] Blood glucose 240 - 300 mg/dL (13.3 - 16.7 mmol/L) AND [2] uses insulin (e.g., insulin-dependent, all people with type 1 diabetes)    Additional Information   Negative: Unconscious or difficult to awaken   Negative: Acting confused (e.g., disoriented, slurred speech)   Negative: Very weak (e.g., can't stand)   Negative: Sounds like a life-threatening emergency to the triager   Negative: [1] Vomiting AND [2] signs of dehydration (e.g., very dry mouth, lightheaded, dark urine)   Negative: [1] Blood glucose > 240 mg/dL (13.3 mmol/L) AND [2] rapid breathing   Negative: Blood glucose > 500 mg/dL (27.8 mmol/L)   Negative: [1] Blood glucose > 240 mg/dL (13.3 mmol/L) AND [2] urine ketones moderate-large (or more than 1+)   Negative: [1] Blood glucose > 240 mg/dL (13.3 mmol/L) AND [2] blood ketones > 1.4 mmol/L   Negative: [1] Blood glucose > 240 mg/dL (13.3 mmol/L) AND [2] vomiting AND [3] unable to check for ketones (in blood or urine)   Negative: [1] New-onset diabetes suspected (e.g., frequent urination, weak, weight loss) AND [2] vomiting or rapid breathing   Negative: Vomiting lasts > 4 hours   Negative: Patient sounds very sick or weak to the triager   Negative: Fever > 100.4 F (38.0 C)   Negative: Blood glucose > 400 mg/dL (22.2 mmol/L)   Negative: [1] Blood glucose > 300 mg/dL (16.7 mmol/L) AND [2] two or more times in a row   Negative: Urine ketones moderate - large (or blood ketones > 1.4 mmol/L)   Negative: [1] Symptoms of high blood sugar (e.g., abnormally thirsty,  frequent urination, weight loss) AND [2] not able to test blood glucose AND [3] pregnant   Negative: [1] Caller has URGENT medication or insulin pump question AND [2] triager unable to answer question   Negative: [1] Blood glucose > 240 mg/dL (13.3 mmol/L) AND [2] pregnant   Negative: [1] Symptoms of high blood sugar (e.g., abnormally thirsty, frequent urination, weight loss) AND [2] not able to test blood glucose   Negative: New-onset diabetes suspected (e.g., abnormally thirsty, frequent urination, weight loss)   Negative: [1] Caller has NON-URGENT medication or insulin pump question AND [2] triager unable to answer question   Negative: [1] Blood glucose > 300 mg/dL (16.7 mmol/L) AND [2] uses insulin (e.g., insulin-dependent, all people with type 1 diabetes)    Protocols used: Diabetes - High Blood Sugar-A-

## 2024-02-10 NOTE — PROGRESS NOTES
I was called by Ms. Yolandamario Betts regarding her medication. She has type II DM and was following diabetes management . Last visit with them was on 02/01/2024.    As per the last note from diabetes management she was to take  Toujeo 40 units (titrate 2 units every 3 days if needed), Continue Metformin, Continue Novolog sliding scales, increase lunch dose to 24 units; Continue Mounjaro 7 mg     She reports that she self discontinued all of her medication as her blood glucose were running low in between 40-50's at night and 80-90's during the day time. She reports that she has not been taking any of her medication for over 7 days. This morning , she reports that her blood glucose were high in 250's following which she took her sliding scale insulin which brought it down to 98. She then reports to have taken breakfast following which her blood glucose started rising again to 250's.  She wants to know if she should take all of her medication as she has not been taking it for 7 days. Since she reports to have episodes of hypoglycemia in the past , she wants to know, how to proceed with medication.    Plan  Start metformin today  Continue with sliding scale  Will reduce glargine at night to 10 units and then titrate up slowly.      Gabriel Jaquez MD

## 2024-02-12 ENCOUNTER — TELEPHONE (OUTPATIENT)
Dept: DIABETES | Facility: CLINIC | Age: 54
End: 2024-02-12
Payer: MEDICAID

## 2024-02-12 ENCOUNTER — TELEPHONE (OUTPATIENT)
Dept: PRIMARY CARE CLINIC | Facility: CLINIC | Age: 54
End: 2024-02-12
Payer: MEDICAID

## 2024-02-12 NOTE — TELEPHONE ENCOUNTER
[facility] audio patient informed. Per Provider patient informed to restart the only Metformin if the readings increased.  If she starts to have lows, stop the toujeo. Patient voiced understanding

## 2024-02-12 NOTE — TELEPHONE ENCOUNTER
Called to offer the patient 2/13/24 the patient states that she will not be available and refused the time, patient will be added to the schedule.      ----- Message from Analisa Lakhani sent at 2/12/2024  1:30 PM CST -----  Contact: Pt  [phone]  Patient is requesting a call back concerning the Next steps to be taken concerning her bowel problem    Please call and advise.    Thank You

## 2024-02-12 NOTE — TELEPHONE ENCOUNTER
Patient called regarding call into Triage related to elevated blood glucose levels over the weekend. Patient was last seen on 2/8 and noted she was instructed to hold all insulin and Metformin and may resume at lower dose if blood glucose rises. Patient reported that she was advised to give 10 units of Toujeo at night and resume Metformin due to elevated glucose levels. During call she reported 213 mg/dl and at end in was 203 mg/dl. Current dexcom uploaded and sent to Provider for review.

## 2024-02-13 ENCOUNTER — CLINICAL SUPPORT (OUTPATIENT)
Dept: REHABILITATION | Facility: HOSPITAL | Age: 54
End: 2024-02-13
Payer: MEDICAID

## 2024-02-13 ENCOUNTER — TELEPHONE (OUTPATIENT)
Dept: DIABETES | Facility: CLINIC | Age: 54
End: 2024-02-13
Payer: MEDICAID

## 2024-02-13 DIAGNOSIS — R68.89 DECREASED STRENGTH, ENDURANCE, AND MOBILITY: ICD-10-CM

## 2024-02-13 DIAGNOSIS — R53.1 DECREASED STRENGTH, ENDURANCE, AND MOBILITY: ICD-10-CM

## 2024-02-13 DIAGNOSIS — M25.671 DECREASED RANGE OF MOTION OF BOTH ANKLES: ICD-10-CM

## 2024-02-13 DIAGNOSIS — M72.2 PLANTAR FASCIITIS, BILATERAL: Primary | ICD-10-CM

## 2024-02-13 DIAGNOSIS — M25.672 DECREASED RANGE OF MOTION OF BOTH ANKLES: ICD-10-CM

## 2024-02-13 DIAGNOSIS — M79.671 BILATERAL FOOT PAIN: ICD-10-CM

## 2024-02-13 DIAGNOSIS — M79.672 BILATERAL FOOT PAIN: ICD-10-CM

## 2024-02-13 DIAGNOSIS — Z74.09 DECREASED STRENGTH, ENDURANCE, AND MOBILITY: ICD-10-CM

## 2024-02-13 PROCEDURE — 97110 THERAPEUTIC EXERCISES: CPT | Mod: CQ

## 2024-02-13 NOTE — PROGRESS NOTES
[facility]   Physical Therapy Treatment Note        Name: Yolanda Betts  Clinic Number: [id number]    Therapy Diagnosis:   Encounter Diagnoses   Name Primary?    Plantar fasciitis, bilateral Yes    Decreased strength, endurance, and mobility     Decreased range of motion of both ankles     Bilateral foot pain      Physician: Sasha Sorenson MD    Visit Date: 2/13/2024      Physician Orders: PT Eval and Treat  Medical Diagnosis from Referral: bilateral plantar fasciitis   Evaluation Date: 2/9/2024  Authorization Period Expiration: 1/29/2025  Plan of Care Expiration: 5/9/2024  Progress Note Due: 3/10/2024  Visit # / Visits authorized: 1/20 (+ evaluation)  FOTO: 1/3 (last performed on 2/9/2024)     Precautions: Standard and Diabetes     PTA Visit #: 1/5     Time In: 0700  Time Out: 0750  Total Billable Time: 30 minutes Billing reflects [place] medicaid guidelines, billing all therapy as therapeutic-exercise   total time in this session: 50 minutes     Subjective     Patient reports: pain in bilateral feet.    She was compliant with home exercise program.    Response to previous treatment: sore after evaluation    Functional change: decreased standing and walking tolerance and pain with raising up on tip toes     Pain: 8/10     Location: right heel and arch of foot (right worse than left)    Objective      Objective Measures updated at progress report or POC update only unless otherwise noted.       Treatment     Yolanda received the treatments listed below:       MANUAL THERAPY TECHNIQUES were applied for (12) minutes, including:    Manual Intervention Performed Today    Soft Tissue Mobilization [x] bilateral  posterior tibialis, anterior tibialis , foot intrinsics bilateral kinesiotape application bilateral arches    Joint Mobilizations [x] Anterior and posterior mobs     []     []    Functional Dry Needling  []      Plan for Next Visit: Continue as needed           THERAPEUTIC  EXERCISES to develop strength, endurance, ROM, flexibility, posture, and core stabilization for (6) minutes including:    Intervention Performed Today    Nustep      Gastrocnemius stretch  x 3 x 30 sitting with strap bilateral    Soleus stretch x 3 x 30 sitting with strap bilateral                               Plan for Next Visit:        NEUROMUSCULAR RE-EDUCATION ACTIVITIES to improve Balance, Coordination, Kinesthetic, Sense, Proprioception, and Posture for (12) minutes.  The following were included:    Intervention Performed Today    Toe yoga x 2 minutes bilateral alternating    Ankle circles  X  x X 30 counter clockwise   X 30 clockwise                                    Plan for Next Visit:          Patient Education and Home Exercises       Home Exercises Provided and Patient Education Provided     Education provided: (during session) minutes  PURPOSE: Patient educated on the impairments noted above and the effects of physical therapy intervention to improve overall condition and QOL.   EXERCISE: Patient was educated on all the above exercise prior/during/after for proper posture, positioning, and execution for safe performance with home exercise program.   STRENGTH: Patient educated on the importance of improved core and extremity strength in order to improve alignment of the spine and extremities with static positions and dynamic movement.     Written Home Exercises Provided: yes.  Exercises were reviewed and Yolanda was able to demonstrate them prior to the end of the session.  Yolanda demonstrated good  understanding of the education provided. See EMR under Patient Instructions for exercises provided during therapy sessions.    Assessment     Patient with tenderness with manual therapy. She was able to perform tow yoga indicating that she was compliant with performing her home exercise program. She left this session feeling less pain after the application of kinesiotape.     Yolanda is progressing well towards her  goals.   Patient prognosis is Good.     Patient will continue to benefit from skilled outpatient physical therapy to address the deficits listed in the problem list box on initial evaluation, provide pt/family education and to maximize patient's level of independence in the home and community environment.     Patient's spiritual, cultural and educational needs considered and pt agreeable to plan of care and goals.       Anticipated Barriers for therapy: co-morbidities           Short Term Goals:  6 weeks Status  Date Met   PAIN: Pt will report worst pain of 5/10 in order to progress toward max functional ability and improve quality of life. [x] Progressing  [] Met  [] Not Met     FUNCTION: Patient will demonstrate improved function as indicated by a score of greater than or equal to 22 out of 100 on FOTO. [x] Progressing  [] Met  [] Not Met     STRENGTH: Patient will improve strength to 50% of stated goals, listed in objective measures above, in order to progress towards independence with functional activities. [x] Progressing  [] Met  [] Not Met     POSTURE: Patient will correct postural deviations in sitting and standing, to decrease pain and promote long term stability.  [x] Progressing  [] Met  [] Not Met     GAIT: Patient will demonstrate improved gait mechanics in order to improve functional mobility, improve quality of life, and decrease risk of further injury or fall.  [x] Progressing  [] Met  [] Not Met     HEP: Patient will demonstrate independence with HEP in order to progress toward functional independence. [x] Progressing  [] Met  [] Not Met        Long Term Goals:  12 weeks Status Date Met   PAIN: Pt will report worst pain of 3/10 in order to progress toward max functional ability and improve quality of life [x] Progressing  [] Met  [] Not Met     FUNCTION: Patient will demonstrate improved function as indicated by a score of greater than or equal to 34 out of 100 on FOTO. [x] Progressing  [] Met  []  Not Met     MOBILITY: Patient will improve AROM to stated goals, listed in objective measures above, in order to return to maximal functional potential and improve quality of life.  [x] Progressing  [] Met  [] Not Met     STRENGTH: Patient will improve strength to stated goals, listed in objective measures above, in order to improve functional independence and quality of life.  [x] Progressing  [] Met  [] Not Met     GAIT: Patient will demonstrate normalized gait mechanics with minimal compensation in order to return to PLOF. [x] Progressing  [] Met  [] Not Met     Patient will return to normal ADL's, IADL's, community involvement, recreational activities, and work-related activities with less than or equal to 1/10 pain and maximal function.  [x] Progressing  [] Met  [] Not Met          Plan     Continue Plan of Care (POC) and progress per patient tolerance. See treatment section for details on planned progressions next session.      Raz Orozco, PTA

## 2024-02-13 NOTE — TELEPHONE ENCOUNTER
Patient called regarding elevated blood sugar- 160 mg/dl patient inquired if she should take insulin according to her numbers or Mounjaro patient advised to take insulin. Current report uploaded for view.    ---- Message from Zohra Starr sent at 2/13/2024  2:50 PM CST -----  Contact: [phone]  Patient is requesting a call in regards to her blood sugar being high. Pt is wanting to know how would she go about taking her insulin. Please call pt back at [phone]. Thanks KB

## 2024-02-14 ENCOUNTER — OFFICE VISIT (OUTPATIENT)
Dept: PRIMARY CARE CLINIC | Facility: CLINIC | Age: 54
End: 2024-02-14
Payer: MEDICAID

## 2024-02-14 VITALS
WEIGHT: 181.19 LBS | TEMPERATURE: 98 F | SYSTOLIC BLOOD PRESSURE: 132 MMHG | OXYGEN SATURATION: 99 % | BODY MASS INDEX: 40.76 KG/M2 | HEART RATE: 76 BPM | HEIGHT: 56 IN | DIASTOLIC BLOOD PRESSURE: 88 MMHG

## 2024-02-14 DIAGNOSIS — I10 ESSENTIAL HYPERTENSION: ICD-10-CM

## 2024-02-14 DIAGNOSIS — K59.09 CHRONIC CONSTIPATION: Chronic | ICD-10-CM

## 2024-02-14 DIAGNOSIS — R10.84 GENERALIZED ABDOMINAL PAIN: Primary | ICD-10-CM

## 2024-02-14 DIAGNOSIS — E66.01 CLASS 3 SEVERE OBESITY WITH SERIOUS COMORBIDITY AND BODY MASS INDEX (BMI) OF 40.0 TO 44.9 IN ADULT, UNSPECIFIED OBESITY TYPE: ICD-10-CM

## 2024-02-14 PROBLEM — E66.813 CLASS 3 SEVERE OBESITY WITH SERIOUS COMORBIDITY AND BODY MASS INDEX (BMI) OF 40.0 TO 44.9 IN ADULT: Status: ACTIVE | Noted: 2022-07-13

## 2024-02-14 PROCEDURE — 4010F ACE/ARB THERAPY RXD/TAKEN: CPT | Mod: CPTII,,, | Performed by: FAMILY MEDICINE

## 2024-02-14 PROCEDURE — 99214 OFFICE O/P EST MOD 30 MIN: CPT | Mod: S$PBB,,, | Performed by: FAMILY MEDICINE

## 2024-02-14 PROCEDURE — 3072F LOW RISK FOR RETINOPATHY: CPT | Mod: CPTII,,, | Performed by: FAMILY MEDICINE

## 2024-02-14 PROCEDURE — 3075F SYST BP GE 130 - 139MM HG: CPT | Mod: CPTII,,, | Performed by: FAMILY MEDICINE

## 2024-02-14 PROCEDURE — 99215 OFFICE O/P EST HI 40 MIN: CPT | Mod: PBBFAC,PN | Performed by: FAMILY MEDICINE

## 2024-02-14 PROCEDURE — 1160F RVW MEDS BY RX/DR IN RCRD: CPT | Mod: CPTII,,, | Performed by: FAMILY MEDICINE

## 2024-02-14 PROCEDURE — 3079F DIAST BP 80-89 MM HG: CPT | Mod: CPTII,,, | Performed by: FAMILY MEDICINE

## 2024-02-14 PROCEDURE — 3008F BODY MASS INDEX DOCD: CPT | Mod: CPTII,,, | Performed by: FAMILY MEDICINE

## 2024-02-14 PROCEDURE — 99999 PR PBB SHADOW E&M-EST. PATIENT-LVL V: CPT | Mod: PBBFAC,,, | Performed by: FAMILY MEDICINE

## 2024-02-14 PROCEDURE — 1159F MED LIST DOCD IN RCRD: CPT | Mod: CPTII,,, | Performed by: FAMILY MEDICINE

## 2024-02-14 RX ORDER — METOPROLOL SUCCINATE 25 MG/1
25 TABLET, EXTENDED RELEASE ORAL DAILY
Qty: 90 TABLET | Refills: 3 | Status: SHIPPED | OUTPATIENT
Start: 2024-02-14 | End: 2025-02-13

## 2024-02-14 NOTE — PATIENT INSTRUCTIONS
"Diabetes and Diet   The Basics   Written by the doctors and editors at [facility]   Why is diet important in diabetes? -- Diet is important because it is part of diabetes treatment. Many people need to change what they eat and how much they eat to help treat their diabetes. It is important for people to treat their diabetes so that they:  Keep their blood sugar at or near a normal level  Prevent long-term problems, such as heart or kidney problems, that can happen in people with diabetes  Changing your diet can also help treat obesity, high blood pressure, and high cholesterol. These conditions can affect people with diabetes and can lead to future problems, such as heart attacks or strokes.  Who will work with me to change my diet? -- Your doctor or nurse will work with you to make a food plan to change your diet. They might also recommend that you work with a "dietitian." A dietitian is an expert on food and eating.  Do I need to eat at the same times every day? -- When and how often you should eat depends, in part, on the diabetes medicines you take. For example:  People who take about the same amount of insulin at the same time each day (called a "fixed regimen") should eat meals at the same times. This is also true for people who take pills that increase insulin levels, such as sulfonylureas. Eating meals at the same time every day helps prevent low blood sugar.  People who adjust the dose and timing of their insulin each day (called a "flexible regimen") do not always have to eat meals at the same time. That's because they can time their insulin dose for before they plan to eat, and also adjust the dose for how much they plan to eat.  People who take medicines that don't usually cause low blood sugar, such as metformin, don't have to eat meals at the same time every day.  What do I need to think about when planning what to eat? -- Our bodies break down the food we eat into small pieces called carbohydrates, " "proteins, and fats.  When planning what to eat, people with diabetes need to think about:  Carbohydrates (or "carbs") - Carbohydrates, which are sugars that our bodies use for energy, can raise a person's blood sugar level. Your doctor, nurse, or dietitian will tell you how many carbohydrates you should eat at each meal or snack. Foods that have carbohydrates include:  Bread, pasta, and rice  Vegetables and fruits  Dairy foods  Foods and drinks with added sugar  It is best to get your carbohydrates from fruits, vegetables, whole grains, and low-fat milk. It is best to avoid drinks with added sugar, like soda, juices, and sports drinks.   Protein - Your doctor, nurse, or dietitian will tell you how much protein you should eat each day. It is best to eat lean meats, fish, eggs, beans, peas, soy products, nuts, and seeds.  Fats - The type of fat you eat is more important than the amount of fat. "Saturated" and "trans" fats can increase your risk for heart problems, like a heart attack.  Foods that have saturated fats include meat, butter, cheese, and ice cream.  Foods that have trans fats include processed food with "partially hydrogenated oils" on the ingredient list. This may include fried foods, store bought cookies, muffins, pies, and cakes.  "Monounsaturated" and "polyunsaturated" fats are better for you. Foods with these types of fat include fish, avocado, olive oil, and nuts.  Calories - People need to eat a certain amount of calories each day to keep their weight the same. People who are overweight and want to lose weight need to eat fewer calories each day.  Fiber - Eating foods with a lot of fiber can help control a person's blood sugar level. Foods that have a lot of fiber include apples, green beans, peas, beans, lentils, nuts, oatmeal, and whole grains.  Salt - People who have high blood pressure should not eat foods that contain a lot of salt (also called sodium). People with high blood pressure should " also eat healthy foods, such as fruits, vegetables, and low-fat dairy foods.  Alcohol - Having more than 1 drink (for women) or 2 drinks (for men) a day can raise blood sugar levels. Also, drinks that have fruit juice or soda in them can raise blood sugar levels.  What can I do if I need to lose weight? -- If you need to lose weight, you can:  Exercise - Try to get at least 30 minutes of physical activity a day, most days of the week. Even gentle exercise, like walking, is good for your health. Some people with diabetes need to change their medicine dose before they exercise. They might also need to check their blood sugar levels before and after exercising.  Eat fewer calories - Your doctor, nurse, or dietitian can tell you how many calories you should eat each day in order to lose weight.  If you are worried about your weight, size, or shape, talk with your doctor, nurse, or dietitian. They can help you make changes to improve your health.  Can I eat the same foods as my family? -- Yes. You do not need to eat special foods if you have diabetes. You and your family can eat the same foods. Changing your diet is mostly about eating healthy foods and not eating too much.  What are the other parts of diabetes treatment? -- Besides changing your diet, the other parts of diabetes treatment are:  Exercise  Medicines  Some people with diabetes need to learn how to match their diet and exercise with their medicine dose. For example, people who use insulin might need to choose the dose of insulin they give themselves. To choose their dose, they need to think about:  What they plan to eat at the next meal  How much exercise they plan to do  What their blood sugar level is  If the diet and exercise do not match the medicine dose, a person's blood sugar level can get too low or too high. Blood sugar levels that are too low or too high can cause problems.  All topics are updated as new evidence becomes available and our peer  review process is complete.  This topic retrieved from [employer] on: Sep 21, 2021.  Topic 44733 Version 7.0  Release: 29.4.2 - C29.263  © 2021 UpToDate, Inc. and/or its affiliates. All rights reserved.  Consumer Information Use and Disclaimer   This information is not specific medical advice and does not replace information you receive from your health care provider. This is only a brief summary of general information. It does NOT include all information about conditions, illnesses, injuries, tests, procedures, treatments, therapies, discharge instructions or life-style choices that may apply to you. You must talk with your health care provider for complete information about your health and treatment options. This information should not be used to decide whether or not to accept your health care provider's advice, instructions or recommendations. Only your health care provider has the knowledge and training to provide advice that is right for you. The use of this information is governed by the [employer] End User License Agreement, available at https://www.[employer]/en/solutions/[employer]/about/flower.The use of [employer] content is governed by the [employer] Terms of Use. ©2021 UpToDate, Inc. All rights reserved.  Copyright

## 2024-02-14 NOTE — TELEPHONE ENCOUNTER
Patient called with update from provider to continue current medications and keep scheduled appt on tomorrow understanding voiced

## 2024-02-14 NOTE — PROGRESS NOTES
"Subjective:       Patient ID: Yolanda Betts is a 53 y.o. female.    Chief Complaint: Other Misc (Bp/constipation with loose stool )      History of Present Illness:   Yolanda Betts 53 y.o. female presents today with Other Misc (Bp/constipation with loose stool )    Here for routine visit   Past Medical History:   Diagnosis Date    Abnormal Pap smear of cervix     HPV genital warts    Anemia     Anxiety     Arthritis     Asthma 10/11/2016    Bipolar 1 disorder     Diabetes mellitus, type 2     Dyslipidemia associated with type 2 diabetes mellitus 05/13/2019    General anesthetics causing adverse effect in therapeutic use     Genital warts     GERD (gastroesophageal reflux disease)     Herpes simplex virus (HSV) infection     Hyperlipidemia     Hypertension     Hypertension complicating diabetes 05/05/2019    Migraine with aura and without status migrainosus, not intractable 03/21/2016    Mild persistent asthma without complication 10/11/2016    Morbid obesity with body mass index (BMI) of 45.0 to 49.9 in adult 08/03/2017    Obstructive sleep apnea     ALEN (obstructive sleep apnea)     2L per N/C q HS    Schizoaffective disorder, bipolar type 04/25/2019    Seasonal allergic rhinitis due to pollen 05/13/2019    Type 2 diabetes mellitus with hyperglycemia, with long-term current use of insulin 12/21/2017    Type 2 diabetes mellitus with hyperglycemia, without long-term current use of insulin 12/21/2017     Family History   Problem Relation Age of Onset    Diabetes Mother     Hyperlipidemia Mother     Hypertension Mother     Asthma Mother     COPD Mother     Glaucoma Mother     Thyroid disease Mother     Anesthesia problems Mother         "almost had a cardiac arrest" , blood clots    Hypertension Father     Hyperlipidemia Father     Cancer Father         Brain, lung, liver, kidney    No Known Problems Sister     Hypertension Brother     Alcohol abuse Brother     Heart disease Maternal Grandmother     " Hyperlipidemia Maternal Grandmother     Hypertension Maternal Grandmother     Cataracts Maternal Grandmother     Diabetes Maternal Grandmother     Heart disease Maternal Grandfather     Hyperlipidemia Maternal Grandfather     Hypertension Maternal Grandfather     Glaucoma Maternal Grandfather     Cancer Maternal Grandfather     Cataracts Maternal Grandfather     Macular degeneration Maternal Grandfather     Diabetes Maternal Grandfather     Heart disease Paternal Grandmother     Hyperlipidemia Paternal Grandmother     Hypertension Paternal Grandmother     Cataracts Paternal Grandmother     Heart disease Paternal Grandfather     Hyperlipidemia Paternal Grandfather     Hypertension Paternal Grandfather     Cataracts Paternal Grandfather     Breast cancer Maternal Cousin     Breast cancer Maternal Cousin     Breast cancer Maternal Cousin     Breast cancer Maternal Cousin      Social History     Socioeconomic History    Marital status: Single   Tobacco Use    Smoking status: Never    Smokeless tobacco: Never   Substance and Sexual Activity    Alcohol use: Yes     Alcohol/week: 0.0 standard drinks of alcohol     Comment: socially  No alcohol 72h prior to sx    Drug use: No    Sexual activity: Yes     Partners: Male     Birth control/protection: Surgical     Comment: hyst   Social History Narrative    Long-term care nurse     Social Determinants of Health     Financial Resource Strain: High Risk (1/18/2024)    Overall Financial Resource Strain (CARDIA)     Difficulty of Paying Living Expenses: Very hard   Food Insecurity: Food Insecurity Present (1/18/2024)    Hunger Vital Sign     Worried About Running Out of Food in the Last Year: Often true     Ran Out of Food in the Last Year: Often true   Transportation Needs: Unmet Transportation Needs (1/18/2024)    PRAPARE - Transportation     Lack of Transportation (Medical): Yes     Lack of Transportation (Non-Medical): Yes   Physical Activity: Inactive (1/18/2024)    Exercise  "Vital Sign     Days of Exercise per Week: 0 days     Minutes of Exercise per Session: 0 min   Stress: Stress Concern Present (1/18/2024)    [employer] - Occupational Stress Questionnaire     Feeling of Stress : Very much   Social Connections: Unknown (1/18/2024)    Social Connection and Isolation Panel [NHANES]     Frequency of Communication with Friends and Family: Twice a week     Frequency of Social Gatherings with Friends and Family: Never     Active Member of Clubs or Organizations: No     Attends Club or Organization Meetings: Never     Marital Status:    Recent Concern: Social Connections - Socially Isolated (1/18/2024)    Social Connection and Isolation Panel [NHANES]     Frequency of Communication with Friends and Family: Twice a week     Frequency of Social Gatherings with Friends and Family: Never     Attends Judaism Services: More than 4 times per year     Active Member of Clubs or Organizations: No     Attends Club or Organization Meetings: Never     Marital Status:    Housing Stability: High Risk (1/18/2024)    Housing Stability Vital Sign     Unable to Pay for Housing in the Last Year: Yes     Number of Places Lived in the Last Year: 1     Unstable Housing in the Last Year: No     Outpatient Encounter Medications as of 2/14/2024   Medication Sig Dispense Refill    ALPRAZolam (XANAX) 1 MG tablet Take 1 tablet (1 mg total) by mouth 2 (two) times daily as needed for Anxiety. 60 tablet 2    amlodipine-valsartan (EXFORGE)  mg per tablet Take 1 tablet by mouth once daily. 90 tablet 3    aspirin (ECOTRIN) 81 MG EC tablet Take 1 tablet by mouth daily 30 tablet 0    atorvastatin (LIPITOR) 20 MG tablet Take 1 tablet (20 mg total) by mouth every evening 90 tablet 3    BD INSULIN SYRINGE ULTRA-FINE 1/2 mL 30 gauge x 1/2" Syrg   0    blood sugar diagnostic (ONETOUCH ULTRA TEST) Strp 1 each by Misc.(Non-Drug; Combo Route) route 4 (four) times daily. [employer] " Glucose Test Strips 400 strip 3    blood sugar diagnostic (ONETOUCH ULTRA TEST) Strp Check blood glucose 4 times daily as directed and as needed (dispense insurance preferred brand or patient choice) 200 each 5    blood-glucose sensor (DEXCOM G7 SENSOR) Mariela Use to check bg. Change every 10 days 3 each 11    DULoxetine (CYMBALTA) 60 MG capsule Take 1 capsule (60 mg total) by mouth 2 (two) times daily. 180 capsule 3    ergocalciferol (VITAMIN D2) 50,000 unit Cap Take 1 capsule twice weekly for 3 weeks then weekly 12 capsule 3    estradioL (ESTRACE) 0.01 % (0.1 mg/gram) vaginal cream Place 1 g vaginally twice a week. 42.5 g 3    flash glucose scanning reader ([employer] AMY 2 READER) Misc Use as directed to check blood sugar 1 each 1    fluticasone propionate (FLONASE) 50 mcg/actuation nasal spray 2 sprays (100 mcg total) by Each Nare route once daily. 16 g 11    frovatriptan (FROVA) 2.5 MG tablet Take 1 tablet at onset of acute migraine. May take 1 more tablet 2 hours later if needed. (MAX 2 TABLET PER DAY. MAX 4 TABLETS PER WEEK.) 9 tablet 1    furosemide (LASIX) 20 MG tablet Take 1 tablet (20 mg total) by mouth once daily. 90 tablet 3    glucagon (BAQSIMI) 3 mg/actuation Spry 1 spray by Nasal route as needed (hypoglycemia). For emergency use. May repeat 1 time after 15 minutes 2 each 1    insulin aspart U-100 (NOVOLOG FLEXPEN U-100 INSULIN) 100 unit/mL (3 mL) InPn pen Inject 24 Units into the skin 3 (three) times daily before meals. Use per sliding scale for breakfast and dinner 45 mL 1    insulin glargine, TOUJEO, (TOUJEO SOLOSTAR U-300 INSULIN) 300 unit/mL (1.5 mL) InPn pen Inject 16 Units into the skin every evening. 3 pen 1    lamoTRIgine (LAMICTAL) 200 MG tablet Take 1 tablet (200 mg total) by mouth 2 (two) times daily. 60 tablet 2    lancets (ONETOUCH DELICA PLUS LANCET) 30 gauge Misc Use as directed 3 times daily 100 each 11    levocetirizine (XYZAL) 5 MG tablet Take 1 tablet (5 mg total) by mouth every  "evening. 30 tablet 11    lifitegrast (XIIDRA) 5 % Dpet Place 1 drop into both eyes 2 (two) times daily. 60 each 12    lurasidone (LATUDA) 80 mg Tab tablet Take 1 tablet (80 mg total) by mouth once daily. 30 tablet 2    magnesium oxide (MAG-OX) 400 mg (241.3 mg magnesium) tablet Take 1 tablet (400 mg total) by mouth once daily. 90 tablet 0    metFORMIN (GLUCOPHAGE) 1000 MG tablet Take 1 tablet (1,000 mg total) by mouth 2 (two) times daily with meals. 180 tablet 1    montelukast (SINGULAIR) 10 mg tablet Take 10 mg by mouth.      omeprazole (PRILOSEC) 40 MG capsule Take 1 capsule (40 mg total) by mouth once daily. 30 capsule 11    pen needle, diabetic (BD ULTRA-FINE MINI PEN NEEDLE) 31 gauge x 3/16" Ndle Use one needle 5 times a day 200 each 11    pen needle, diabetic (BD ULTRA-FINE MINI PEN NEEDLE) 31 gauge x 3/16" Ndle Use 5 a day 200 each 11    pneumoc 20-tatiana conj-dip cr,PF, (PREVNAR-20, PF,) 0.5 mL Syrg injection Inject 0.5 mLs into the muscle. 0.5 mL 0    promethazine (PHENERGAN) 12.5 MG Tab       prucalopride (MOTEGRITY) 2 mg Tab Take 1 tablet (2 mg) by mouth once daily. 30 tablet 11    secukinumab (COSENTYX PEN, 2 PENS,) 150 mg/mL PnIj Inject 300 mg (2 mL) into the skin every 28 days. 2 mL 6    spironolactone (ALDACTONE) 25 MG tablet Take 1 tablet (25 mg total) by mouth once daily. 90 tablet 3    tiZANidine (ZANAFLEX) 4 MG tablet Take 2 tablets (8 mg total) by mouth every 6 (six) hours as needed (Muscle spasms). 240 tablet 0    traMADoL (ULTRAM) 50 mg tablet Take 1 tablet (50 mg total) by mouth every 6 (six) hours as needed for Pain. 28 tablet 0    [DISCONTINUED] metoprolol succinate (TOPROL-XL) 50 MG 24 hr tablet Take 1 tablet (50 mg total) by mouth every evening. 30 tablet 11    fenofibrate (TRICOR) 145 MG tablet Take 1 tablet (145 mg total) by mouth once daily. 90 tablet 3    metoprolol succinate (TOPROL-XL) 25 MG 24 hr tablet Take 1 tablet (25 mg total) by mouth once daily. 90 tablet 3    mirtazapine " "(REMERON) 15 MG tablet Take 1 tablet by mouth at bedtime. 30 tablet 1    [DISCONTINUED] clonazePAM (KLONOPIN) 1 MG tablet Take 1 tablet by mouth daily 30 tablet 0    [DISCONTINUED] glucagon, human recombinant, (GLUCAGON EMERGENCY KIT, HUMAN,) 1 mg SolR Inject 1 mg into the muscle as needed. Emergency use 1 each 1    [DISCONTINUED] valsartan (DIOVAN) 320 MG tablet Take 1 tablet (320 mg total) by mouth once daily. 90 tablet 3    [DISCONTINUED] zolpidem (AMBIEN) 10 mg Tab Take 1 tablet (10 mg total) by mouth every evening. 30 tablet 1     Facility-Administered Encounter Medications as of 2/14/2024   Medication Dose Route Frequency Provider Last Rate Last Admin    ondansetron injection 4 mg  4 mg Intravenous Once PRN Otto Phillips MD           Review of Systems   Constitutional:  Negative for chills and fever.   HENT:  Negative for congestion and facial swelling.    Eyes:  Negative for discharge and itching.   Respiratory:  Negative for cough and wheezing.    Cardiovascular:  Negative for chest pain and palpitations.   Gastrointestinal:  Positive for abdominal pain, constipation and diarrhea. Negative for nausea and vomiting.   Endocrine: Negative for cold intolerance and heat intolerance.   Genitourinary:  Negative for dysuria and flank pain.   Musculoskeletal:  Negative for myalgias and neck stiffness.   Skin:  Negative for pallor and wound.   Neurological:  Negative for facial asymmetry and weakness.   Psychiatric/Behavioral:  Negative for agitation and suicidal ideas.        Objective:      /88 (BP Location: Right arm, Patient Position: Sitting, BP Method: Medium (Manual))   Pulse 76   Temp 97.9 °F (36.6 °C)   Ht 4' 8" (1.422 m)   Wt 82.2 kg (181 lb 3.2 oz)   SpO2 99%   BMI 40.62 kg/m²   Physical Exam  Abdominal:      General: Bowel sounds are normal. There is distension.      Tenderness: There is generalized abdominal tenderness. There is no guarding or rebound.         Results for orders placed or " performed during the hospital encounter of 01/30/24   POCT glucose   Result Value Ref Range    POCT Glucose 98 70 - 110 mg/dL     *Note: Due to a large number of results and/or encounters for the requested time period, some results have not been displayed. A complete set of results can be found in Results Review.     Assessment:       1. Generalized abdominal pain    2. Essential hypertension    3. Class 3 severe obesity with serious comorbidity and body mass index (BMI) of 40.0 to 44.9 in adult, unspecified obesity type    4. BMI 40.0-44.9, adult    5. Chronic constipation        Plan:   1. Generalized abdominal pain  Comments:  Rule out ascites  Overview:  Acute on chronic.  Present for several week.  Abdominal x-ray was negative.  Rule out ascites    Orders:  -     US Abdomen Complete; Future; Expected date: 02/14/2024    2. Essential hypertension  Overview:  Chronic stable  Due to obesity, will tailor her meds and cut acute on ch low back pain on BB which can cause weight gain.  Advised to follow up with psych for management of her other meds.    Orders:  -     metoprolol succinate (TOPROL-XL) 25 MG 24 hr tablet; Take 1 tablet (25 mg total) by mouth once daily.  Dispense: 90 tablet; Refill: 3    3. Class 3 severe obesity with serious comorbidity and body mass index (BMI) of 40.0 to 44.9 in adult, unspecified obesity type    4. BMI 40.0-44.9, adult  Overview:  Chronic problem  Maximum weight was 250  Able to get down to 181 lbs on Ozempic, was switched to Mounjaro due to complaints of intolerable side effects.  Mounjaro now discontinued due to complains of constipation.  Today, she is insisting on going acute on ch low back pain to Ozempic because she does not want to regain the weight.  However she had come in complaining of abdominal pain with constipation refractory to treatment, when she was told that she cannot go back to GLP 1 due to her present complaint, she retracted her statement, reports that she is  having loose stools on Linzess.    Current symptoms contradicts Ozempic, therefore it will not be prescribed until the current issues are resolved or prognosis known.      5. Chronic constipation  Comments:  Continue Linzess  Overview:  She has chronic constipation and have failed diff meds including amitiza, linzess, lactulose etc. Recently started on Mounjaro. Worsening constipation, feels bloated all the time and not eating.  Stop the mounjaro. Screen with xray.  See medication          I have reviewed all of the patient's clinical history available in care everywhere and Epic and have utilized this in my evaluation and management recommendations today.      Treatment options and alternatives were discussed with the patient. Patient was given ample time to ask questions. All questions were answered. Voices understanding and acceptance of this advice. Will call back if any further questions or concerns.     Portions of the record may have been created with voice recognition software. Occasional wrong-word or sound-a-like substitutions may have occurred due to the inherent limitations of voice recognition software. Read the chart carefully and recognize, using context, where substitutions have occurred.               Sasha Sorenson MD  [facility], [place]

## 2024-02-15 ENCOUNTER — HOSPITAL ENCOUNTER (OUTPATIENT)
Dept: RADIOLOGY | Facility: HOSPITAL | Age: 54
Discharge: HOME | End: 2024-02-15
Attending: FAMILY MEDICINE
Payer: MEDICAID

## 2024-02-15 ENCOUNTER — OFFICE VISIT (OUTPATIENT)
Dept: DIABETES | Facility: CLINIC | Age: 54
End: 2024-02-15
Payer: MEDICAID

## 2024-02-15 ENCOUNTER — TELEPHONE (OUTPATIENT)
Dept: PRIMARY CARE CLINIC | Facility: CLINIC | Age: 54
End: 2024-02-15
Payer: MEDICAID

## 2024-02-15 DIAGNOSIS — R10.84 GENERALIZED ABDOMINAL PAIN: ICD-10-CM

## 2024-02-15 DIAGNOSIS — I15.2 HYPERTENSION COMPLICATING DIABETES: ICD-10-CM

## 2024-02-15 DIAGNOSIS — E11.69 TYPE 2 DIABETES MELLITUS WITH OTHER SPECIFIED COMPLICATION, UNSPECIFIED WHETHER LONG TERM INSULIN USE: Primary | ICD-10-CM

## 2024-02-15 DIAGNOSIS — E78.5 DYSLIPIDEMIA ASSOCIATED WITH TYPE 2 DIABETES MELLITUS: ICD-10-CM

## 2024-02-15 DIAGNOSIS — E11.59 HYPERTENSION COMPLICATING DIABETES: ICD-10-CM

## 2024-02-15 DIAGNOSIS — E11.69 DYSLIPIDEMIA ASSOCIATED WITH TYPE 2 DIABETES MELLITUS: ICD-10-CM

## 2024-02-15 PROCEDURE — 76705 ECHO EXAM OF ABDOMEN: CPT | Mod: TC

## 2024-02-15 PROCEDURE — 99213 OFFICE O/P EST LOW 20 MIN: CPT | Mod: 95,,, | Performed by: NURSE PRACTITIONER

## 2024-02-15 PROCEDURE — 1159F MED LIST DOCD IN RCRD: CPT | Mod: CPTII,95,, | Performed by: NURSE PRACTITIONER

## 2024-02-15 PROCEDURE — 76705 ECHO EXAM OF ABDOMEN: CPT | Mod: 26,,, | Performed by: RADIOLOGY

## 2024-02-15 PROCEDURE — 4010F ACE/ARB THERAPY RXD/TAKEN: CPT | Mod: CPTII,95,, | Performed by: NURSE PRACTITIONER

## 2024-02-15 PROCEDURE — 3072F LOW RISK FOR RETINOPATHY: CPT | Mod: CPTII,95,, | Performed by: NURSE PRACTITIONER

## 2024-02-15 PROCEDURE — 1160F RVW MEDS BY RX/DR IN RCRD: CPT | Mod: CPTII,95,, | Performed by: NURSE PRACTITIONER

## 2024-02-15 NOTE — TELEPHONE ENCOUNTER
Returned call to patient to inform she has completed US today however provider has not reviewed results due to in clinic duties. She states she is not constipated it is however dealing with her liver. She states she just wanted to let Dr. Sorenson know that it is not constipation. Informed pt to please allow time for provider to review results and she will be notified of plan of care. She voiced understanding.

## 2024-02-15 NOTE — TELEPHONE ENCOUNTER
----- Message from Sara Matta sent at 2/15/2024  2:43 PM CST -----  Contact: Yolanda Mathew is calling in regards to after the Doctor look at US results would she still be able to take the secukinumab (COSENTYX PEN, 2 PENS,) 150 mg/mL PnIj.please call back at .[phone]             Thanks  JERALD

## 2024-02-15 NOTE — PROGRESS NOTES
Established Patient - Audio Only Telehealth Visit     The patient location is: home, [place]  The chief complaint leading to consultation is: DM follow up  Visit type: Virtual visit with audio only (telephone)  Total time spent with patient: 20 min       The reason for the audio only service rather than synchronous audio and video virtual visit was related to technical difficulties or patient preference/necessity.     Each patient to whom I provide medical services by telemedicine is:  (1) informed of the relationship between the physician and patient and the respective role of any other health care provider with respect to management of the patient; and (2) notified that they may decline to receive medical services by telemedicine and may withdraw from such care at any time. Patient verbally consented to receive this service via voice-only telephone call.       HPI:     Yolanda Betts is a 53 y.o. Black or  female presenting for a follow up for diabetes. Patient has been diagnosed with diabetes for > 10 years.      Complications related to diabetes:  HTN, Hyperlipidemia, peripheral neuropathy; denies Pancreatitis; denies Gastroparesis; denies DKA; denies Hx/family Hx of MEN2/MTC; reports Frequent UTIs/yeast infections; Bariatric surgery 6/1/21.      Past failed treatment include:  Jardiance- multiple yeast infections; Victoza - GI upset     Blood glucose testing is performed regularly with her Dexcom; PP BG have been high, FBS < generally 130s     Compliance:The patient reports medication and diet compliance most of the time.      Assessment and plan:      Type 2 diabetes mellitus        Diabetes Medications               glucagon (BAQSIMI) 3 mg/actuation Spry 1 spray by Nasal route as needed (hypoglycemia). For emergency use. May repeat 1 time after 15 minutes    insulin aspart U-100 (NOVOLOG FLEXPEN U-100 INSULIN) 100 unit/mL (3 mL) InPn pen Inject 24 Units into the skin 3 (three) times daily  before meals. Use per sliding scale for breakfast and dinner She is taking per sliding scales ~ 5-6 units per/around meals TID    insulin glargine, TOUJEO, (TOUJEO SOLOSTAR U-300 INSULIN) 300 unit/mL (1.5 mL) InPn pen Inject 16 Units into the skin every evening. Taking 10 units    metFORMIN (GLUCOPHAGE) 1000 MG tablet Take 1 tablet (1,000 mg total) by mouth 2 (two) times daily with meals.                Daily ss  If blood pre-meal sugar is 151-200 add +2 units of Novolog;  If blood pre-meal sugar is 201-250 add +4 units of Novolog;  If blood pre-meal sugar is 251-300 add +6 units of Novolog;  If blood pre-meal sugar is 301-350 add  +8 units of Novolog;  If blood pre-meal sugar is 351-400+ add  +10 units of Novolog;        Correction scale (for steroid use/outside of eating a meal):  Novolog every 3 hours using correction scale outside of mealtime.  -200: 2 units  -250: 4 units  -300: 6 units  -350: 8 units  BG greater than 350: 10 units         PLAN:   - Condition: uncontrolled    - Monitor blood glucose 4x daily. Goals reviewed. She understands the hypoglycemia protocol   - Diet- recommended small meals to avoid possible reactive hypoglycemia   - BP and LDL- Recommended lifestyle modifications for management. Encouraged healthy low fat, low carb diet and increase physical activity  - Medication Changes: Continue plan, omnipod in process  - The patient was explained the above plan and given opportunity to ask questions.  She understands, chooses and consents to this plan and accepts all the risks, which include but are not limited to the risks mentioned above.   - Labs ordered as above  - Nurse visit:  deferred    - Follow up: 3 weeks             This service was not originating from a related E/M service provided within the previous 7 days nor will  to an E/M service or procedure within the next 24 hours or my soonest available appointment.  Prevailing standard of care was able to be  met in this audio-only visit.

## 2024-02-16 ENCOUNTER — TELEPHONE (OUTPATIENT)
Dept: DIABETES | Facility: CLINIC | Age: 54
End: 2024-02-16
Payer: MEDICAID

## 2024-02-16 DIAGNOSIS — E11.69 TYPE 2 DIABETES MELLITUS WITH OTHER SPECIFIED COMPLICATION, UNSPECIFIED WHETHER LONG TERM INSULIN USE: Primary | ICD-10-CM

## 2024-02-16 RX ORDER — INSULIN PMP CART,AUT,G6/7,CNTR
1 EACH SUBCUTANEOUS
Qty: 30 EACH | Refills: 3 | Status: SHIPPED | OUTPATIENT
Start: 2024-02-16

## 2024-02-16 RX ORDER — INSULIN PMP CART,AUT,G6/7,CNTR
1 EACH SUBCUTANEOUS ONCE
Qty: 1 EACH | Refills: 0 | Status: SHIPPED | OUTPATIENT
Start: 2024-02-16 | End: 2024-02-22

## 2024-02-16 RX ORDER — INSULIN ASPART 100 [IU]/ML
100 INJECTION, SOLUTION INTRAVENOUS; SUBCUTANEOUS
Qty: 10 ML | Refills: 5 | Status: SHIPPED | OUTPATIENT
Start: 2024-02-16 | End: 2024-05-24

## 2024-02-16 NOTE — TELEPHONE ENCOUNTER
Current regimen   Metformin 1000 mg BID  Toujeo 10 units daily  Novolog 5-6 units TID per ss    Pre- pump+ 25 units  Pump TDD 19      Pump Type: OP5  Insulin: Novolog  Basal rates: 0.4    Bolus settings:   Insulin Sensitivity Factor: 89 mg/dL  CHO RATIO:  24 grams  Target B  Correct above: 130  Insulin On Board: 3 hours  Max Bolus: 25 units  Max Basal: 2 u/hr

## 2024-02-17 DIAGNOSIS — R10.84 GENERALIZED ABDOMINAL PAIN: Primary | ICD-10-CM

## 2024-02-17 RX ORDER — DICYCLOMINE HYDROCHLORIDE 10 MG/1
10 CAPSULE ORAL
Qty: 120 CAPSULE | Refills: 0 | Status: SHIPPED | OUTPATIENT
Start: 2024-02-17 | End: 2024-02-21

## 2024-02-17 NOTE — PROGRESS NOTES
US of the abdomen is normal except for known fatty liver.    Dicyclomine has been sent to your pharmacy to help with the abd symptoms. RTC in one month for re evaluation. Additional Notes: Patient consent was obtained to proceed with the visit and recommended plan of care after discussion of all risks and benefits, including the risks of COVID-19 exposure. Detail Level: Simple

## 2024-02-19 ENCOUNTER — TELEPHONE (OUTPATIENT)
Dept: PSYCHIATRY | Facility: CLINIC | Age: 54
End: 2024-02-19
Payer: MEDICAID

## 2024-02-19 ENCOUNTER — TELEPHONE (OUTPATIENT)
Dept: PRIMARY CARE CLINIC | Facility: CLINIC | Age: 54
End: 2024-02-19
Payer: MEDICAID

## 2024-02-19 NOTE — TELEPHONE ENCOUNTER
----- Message from Arnel Coburn sent at 2/19/2024 12:12 PM CST -----  Contact: Patient  Patient is calling to speak with the nurse regarding a referral. Please call patient at [phone] .

## 2024-02-19 NOTE — TELEPHONE ENCOUNTER
Returned call to patient in regards to message. Patient requested a hepatology referral. Provider notified and spoke with patient. Informed of US results and advised to contact insurance to inquire what hepatology physician is covered and accepting new patients. Referral will be placed or faxed. She voiced understanding.

## 2024-02-20 ENCOUNTER — TELEPHONE (OUTPATIENT)
Dept: DIABETES | Facility: CLINIC | Age: 54
End: 2024-02-20
Payer: MEDICAID

## 2024-02-20 NOTE — PROGRESS NOTES
Chronic Pain-Tele-Medicine-Established Note (Follow up visit)    The patient location is:  [place]  The chief complaint leading to consultation is:  Neck pain  Visit type: Virtual visit with synchronous audio and video  Total time spent with patient:  10-15 minutes  Each patient to whom he or she provides medical services by telemedicine is: (1) informed of the relationship between the physician and patient and the respective role of any other health care provider with respect to management of the patient; and (2) notified that he or she may decline to receive medical services by telemedicine and may withdraw from such care at any time.      Notes:    SUBJECTIVE:  Interval History (2/26/2024):  Patient Yolanda Betts presents today for follow-up visit.  Since last appt she has had:  1/30/2024 C5/6 IL ROCAEL relief x 1 week, now at baseline.   12/19/2023 Yusef C5-7 MBB no relief.  He still complains of cervical pain which radiates into the bilateral shoulders.  She rates her pain today an 8/10.  She is tried multiple medications in the past with either side effects or no relief including Topamax, gabapentin, Lyrica.  She is currently taking Cymbalta and tizanidine.  She has done physical therapy in the past which has worked really well for her and she is interested in resuming physical therapy program.  No new weakness into the arms or changes in neurological symptoms.    Interval history 12/1/2023  Yolanda Betts presents to tele-medicine appointment for a follow-up appointment for neck pain. Since the last visit, Yolanda Betts states the pain has been worsening. Current pain intensity is 10/10.  She locates the majority of pain to the cervical myofascial region with radiation into her shoulder blade area.  She denies any radiation into the upper extremities.  Previous cervical ROCAEL provided 50% relief for only about 3 weeks.    Interval History (1/17/2023):  Yolanda Betts presents today for  follow-up visit.  Patient was last seen on 05/09/2022. At that visit, the plan was to discontinue Savella and Flexeril, continue Cymbalta 60 mg once daily, and begin Tizanidine. She reports she had a slip and fall on Sunday in her tub. Denies any dizziness or LOC before fall, just slipped. Able to get up. Sore all over since then. Patient reports pain as 10/10 today. MRI of cervical spine ordered by Dr. Kirkpatrick, who thinks shoulder pain is stemming more from neck. No injections in shoulder. Pain begins in neck and radiates into both arms and down spine. Reports nothing helps her pain. Requesting referral for medicinal marijuana as she heard that this may be beneficial.        Interval HPI 05/09/2022  Yolanda Betts is a 52 y.o. female presents today for tele medicine follow-up.  She was recently seen on 03/07/2022.  At that visit, she was provided with a Medrol Dosepak and advised to continue with topical analgesics along with Savella, Cymbalta, and Flexeril p.r.n..  She reports less than 10% relief with this current medication regimen and is having poor sleep.     Interval History (3/7/2022):    Yolanda Betts presents today for follow-up visit.  Patient was last seen about 6 months ago.  She doesn't feel pain is controlled currently, but she does not feel like she can go up on the Savella because she is concerned about drowsiness.  She continues to take the Flexeril twice a day.  She alternates with Voltaren gel and lidocaine cream, which helps somewhat.  She continues to follow-up with orthopedics and had a right shoulder injection on 02/07/2022 with some pain relief.  Patient reports pain as 10/10 today.     Interval History (9/17/2021):  Yolanda Betts presents today on telemedicine visit.  Patient was last seen on 03/23/2021 with Dr. Hay. At that visit, she was on Cymbalta 60 mg twice a day, which she is now only taking once a day.  She is also taking Savella, and she inquires about an  increase.  Patient reports pain as 8/10 today.     History of Present Illness:   This patient is a 50 y.o. female who presents today complaining of the above noted pain/s. The patient describes the pain as follows.  Ms. Betts his new patient clinic with complaints of bilateral shoulders, bilateral hips, thoracic and lumbar spine pain.  She has a history of fibromyalgia reports that this pain feels different from the fibromyalgia.  She has been diagnosed with fibromyalgiafor approximately 10 years has been having these joint complaints for approximately last 5 years.  She describes a sharp throbbing sensation which is worse with movement and she has been unable find anything that provides pain relief.  She has tried numerous different medications including Aleve, Tylenol, Robaxin, tizanidine, Advil, gabapentin, Flexeril, Lyrica, Cymbalta, baclofen in addition to currently participating in physical therapy.  She has had shoulder joint injections in the past which helped for short period of time but denies having had hip injections.  She denies having had surgery on any of these joints.  She reports that anti-inflammatory medications cause her blood pressure to increase therefore she has to avoid them.     Yolanda Betts is a 52 y.o. female  who is presenting with a chief complaint of lumbar back pain . The patient began experiencing this problem insidiously, and the pain has been gradually worsening over the past 5 year(s). The pain is described as throbbing, cramping, aching and heavy and is located in the bilateral lumbar spine . Pain is intermittent and lasts hours. The  pain is nonradiating. The patient rates her pain a 7 out of ten and interferes with activities of daily living a 6 out of ten. Pain is exacerbated by flexion/ extension of the lumbar spine, and is improved by rest. Patient reports prior trauma, no prior spinal surgery      - pertinent negatives: No fever, No chills, No weight loss, No  bladder dysfunction, No bowel dysfunction, No saddle anesthesia  - pertinent positives: none    - medications, other therapies tried (physical therapy, injections):     >> NSAIDs, Tylenol, Tramadol, gabapentin, lyrica, zanaflex and flexeril    >> Has previously undergone Physical Therapy      And procedures:   -C6-7 IL ROCAEL on 01/31/2023, 50% relief for 3 weeks   1/30/2024 C5/6 IL ROCAEL relief x 1 week, now at baseline.   12/19/2023 Yusef C5-7 MBB no relief.     Imaging / Labs / Studies (reviewed on 02/26/2024):     MRI cervical spine 01/03/2023  FINDINGS:  The vertebral body heights and alignment are maintained throughout the cervical spine.     No abnormal vertebral body marrow signal.     Multilevel intervertebral disc desiccation.     No abnormal signal at the cervicomedullary junction or cervical spinal cord.     C2-3: Normal intervertebral disc contour. No central canal or neural foraminal narrowing.     C3-4: Normal intervertebral disc contour.  Bilateral uncovertebral and facet hypertrophy.  No central canal or neural foraminal narrowing.     C4-5: Intervertebral disc bulge.  Bilateral uncovertebral and facet hypertrophy.  No central canal narrowing.  Mild bilateral neural foraminal narrowing.     C5-6: Intervertebral disc bulge.  Bilateral uncovertebral and facet hypertrophy.  Minimal bilateral neural foraminal narrowing.  No central canal narrowing.     C6-7: Intervertebral disc bulge.  No central canal or neural foraminal narrowing.     C7-T1: Normal intervertebral disc contour. No central canal or neural foraminal narrowing.     The paravertebral soft tissues are unremarkable.     Impression:     At C4-5, there is mild intervertebral disc bulge with uncovertebral and facet hypertrophy mildly narrowing the bilateral neural foramina.     At C5-6, there is an intervertebral disc bulge with uncovertebral and facet hypertrophy minimally narrowing the bilateral neural foramina.      11/13/2020 X-Ray Lumbar Spine Ap  And Lateral  COMPARISON:  Lumbar spine radiographs August 18, 2020     FINDINGS:  Alignment of the lumbar spine is normal without listhesis.  No fracture.  No definite pars defect.  No suspicious osseous lesion.  Intervertebral disc spaces of the lumbar spine are maintained.  Inferior lumbar spine facet arthropathy is noted.  Sacroiliac joints are unremarkable.  Multiple phleboliths are present within the pelvis.        1/14/21 X-Ray Cervical Spine Complete 5 view  COMPARISON:  August 18, 2020     FINDINGS:  Visualization of C7-T1 level on the lateral view.  Vertebral height and alignment maintained with straightening of the normal curvature.  This can be seen with positioning as well as spasm and/or strain.  Prevertebral soft tissues, C1-2 articulation and odontoid normal in appearance allowing for positioning.  Pre dens space normal.  Neural foramina at widely patent at all levels bilaterally.     Remaining included osseous structures intact.  Visualized upper lung fields clear.     Impression  Straightening of normal C-spine curvature.  This can be seen with positioning as well as spasm and/or strain.     No acute fracture or subluxation.  Follow-up and or further evaluation as warranted.       PMHx,PSHx, Social history, and Family history:  I have reviewed the patient's medical, surgical, social, and family history in detail and updated the computerized patient record.    Review of patient's allergies indicates:   Allergen Reactions    Codeine Itching    Hydromorphone Other (See Comments)     Can't wake up for long time if taken  Slow to wake up after surgery after receiving    Aleve [naproxen sodium]      Increases BP    Lyrica [pregabalin] Itching    Motrin [ibuprofen]      Increases BP    Neuromuscular blockers, steroidal Hives     some    Latex, natural rubber Rash    Morphine Rash     itching    Norco [hydrocodone-acetaminophen] Itching, Rash and Hallucinations    Seconal [secobarbital sodium] Rash      "itching    Tylox [oxycodone-acetaminophen] Rash       Current Outpatient Medications   Medication Sig    ALPRAZolam (XANAX) 1 MG tablet Take 1 tablet (1 mg total) by mouth 2 (two) times daily as needed for Anxiety.    amlodipine-valsartan (EXFORGE)  mg per tablet Take 1 tablet by mouth once daily.    aspirin (ECOTRIN) 81 MG EC tablet Take 1 tablet by mouth daily    atorvastatin (LIPITOR) 20 MG tablet Take 1 tablet (20 mg total) by mouth every evening    BD INSULIN SYRINGE ULTRA-FINE 1/2 mL 30 gauge x 1/2" Syrg     blood-glucose sensor (DEXCOM G7 SENSOR) Mariela Use to check bg. Change every 10 days    cariprazine (VRAYLAR) 1.5 mg Cap Take 1 capsule (1.5 mg total) by mouth every morning for 7 days, THEN 2 capsules (3 mg total) every morning for 7 days.    [START ON 3/6/2024] cariprazine (VRAYLAR) 3 mg Cap Take 1 capsule (3 mg total) by mouth every morning.    DULoxetine (CYMBALTA) 60 MG capsule Take 1 capsule (60 mg total) by mouth 2 (two) times daily.    ergocalciferol (VITAMIN D2) 50,000 unit Cap Take 1 capsule twice weekly for 3 weeks then weekly    estradioL (ESTRACE) 0.01 % (0.1 mg/gram) vaginal cream Place 1 g vaginally twice a week.    fenofibrate (TRICOR) 145 MG tablet Take 1 tablet (145 mg total) by mouth once daily.    fluticasone propionate (FLONASE) 50 mcg/actuation nasal spray 2 sprays (100 mcg total) by Each Nare route once daily.    frovatriptan (FROVA) 2.5 MG tablet Take 1 tablet at onset of acute migraine. May take 1 more tablet 2 hours later if needed. (MAX 2 TABLET PER DAY. MAX 4 TABLETS PER WEEK.)    furosemide (LASIX) 20 MG tablet Take 1 tablet (20 mg total) by mouth once daily.    glucagon (BAQSIMI) 3 mg/actuation Spry 1 spray by Nasal route as needed (hypoglycemia). For emergency use. May repeat 1 time after 15 minutes    insulin aspart U-100 (NOVOLOG FLEXPEN U-100 INSULIN) 100 unit/mL (3 mL) InPn pen Inject 24 Units into the skin 3 (three) times daily before meals. Use per sliding scale for " "breakfast and dinner    insulin aspart U-100 (NOVOLOG U-100 INSULIN ASPART) 100 unit/mL injection Inject 100 Units into the skin Every 3 (three) days. To use via insulin pump    insulin glargine, TOUJEO, (TOUJEO SOLOSTAR U-300 INSULIN) 300 unit/mL (1.5 mL) InPn pen Inject 16 Units into the skin every evening.    insulin pump cart,automated,BT (OMNIPOD 5 G6 PODS, GEN 5,) Crtg Inject 1 each into the skin Every 3 (three) days.    lamoTRIgine (LAMICTAL) 200 MG tablet Take 1 tablet (200 mg total) by mouth 2 (two) times daily.    levocetirizine (XYZAL) 5 MG tablet Take 1 tablet (5 mg total) by mouth every evening.    lifitegrast (XIIDRA) 5 % Dpet Place 1 drop into both eyes 2 (two) times daily.    lurasidone (LATUDA) 20 mg Tab tablet Take 2 tablets (40 mg total) by mouth once daily for 7 days, THEN 1 tablet (20 mg total) once daily for 7 days.    magnesium oxide (MAG-OX) 400 mg (241.3 mg magnesium) tablet Take 1 tablet (400 mg total) by mouth once daily.    metFORMIN (GLUCOPHAGE) 1000 MG tablet Take 1 tablet (1,000 mg total) by mouth 2 (two) times daily with meals.    metoprolol succinate (TOPROL-XL) 25 MG 24 hr tablet Take 1 tablet (25 mg total) by mouth once daily.    mirtazapine (REMERON) 15 MG tablet Take 1 tablet by mouth at bedtime.    omeprazole (PRILOSEC) 40 MG capsule Take 1 capsule (40 mg total) by mouth once daily.    pen needle, diabetic (BD ULTRA-FINE MINI PEN NEEDLE) 31 gauge x 3/16" Ndle Use one needle 5 times a day    pen needle, diabetic (BD ULTRA-FINE MINI PEN NEEDLE) 31 gauge x 3/16" Ndle Use 5 a day    secukinumab (COSENTYX PEN, 2 PENS,) 150 mg/mL PnIj Inject 300 mg (2 mL) into the skin every 28 days.    spironolactone (ALDACTONE) 25 MG tablet Take 1 tablet (25 mg total) by mouth once daily.    tiZANidine (ZANAFLEX) 4 MG tablet Take 2 tablets (8 mg total) by mouth every 6 (six) hours as needed (Muscle spasms).     No current facility-administered medications for this visit.     Facility-Administered " Medications Ordered in Other Visits   Medication    ondansetron injection 4 mg       REVIEW OF SYSTEMS:    GENERAL:  No weight loss, malaise or fevers.  HEENT:   No recent changes in vision or hearing  NECK:  Negative for lumps, no difficulty with swallowing.  RESPIRATORY:  Negative for cough, wheezing or shortness of breath, patient denies any recent URI.  CARDIOVASCULAR:  Negative for chest pain, leg swelling or palpitations.  GI:  Negative for abdominal discomfort, blood in stools or black stools or change in bowel habits.  MUSCULOSKELETAL:  See HPI.  SKIN:  Negative for lesions, rash, and itching.  PSYCH:  No mood disorder or recent psychosocial stressors.  Patients sleep is not disturbed secondary to pain.  HEMATOLOGY/LYMPHOLOGY:  Negative for prolonged bleeding, bruising easily or swollen nodes.  Patient is not currently taking any anti-coagulants  NEURO:   No history of headaches, syncope, paralysis, seizures or tremors.  All other reviewed and negative other than HPI.  Telemedicine Exam  There were no vitals filed for this visit.  There is no height or weight on file to calculate BMI.   (reviewed on 2/26/2024)     GENERAL: Well appearing, in no acute distress, alert and oriented x3.  Cooperative.  PSYCH:  Mood and affect appropriate.  SKIN: Skin color & texture with no obvious abnormalities.    HEAD/FACE:  Normocephalic, atraumatic.    PULM:  No difficulty breathing. No nasal flaring. No obvious wheezing.  EXTREMITIES: No obvious deformities. Moving all extremities well, appears to have symmetric strength throughout.  MUSCULOSKELETAL: No obvious atrophy abnormalities are noted.   NEURO: No obvious neurologic deficit.   GAIT: sitting.     Physical Exam: last in clinic visit:    OBJECTIVE:  Physical exam:  GENERAL: Well appearing, in no acute distress, alert and oriented x3.    PSYCH:  Mood and affect appropriate.  SKIN: Skin color, texture, turgor normal, no rashes or lesions.  HEAD/FACE:  Normocephalic,  atraumatic. Cranial nerves grossly intact.  CV:  No peripheral edema noted  PULM:  No difficulty breathing          LABS:  Lab Results   Component Value Date    WBC 8.74 01/11/2024    HGB 10.8 (L) 01/11/2024    HCT 34.7 (L) 01/11/2024    MCV 85 01/11/2024     (H) 01/11/2024       CMP  Sodium   Date Value Ref Range Status   01/11/2024 138 136 - 145 mmol/L Final     Potassium   Date Value Ref Range Status   01/11/2024 4.7 3.5 - 5.1 mmol/L Final     Chloride   Date Value Ref Range Status   01/11/2024 103 95 - 110 mmol/L Final     CO2   Date Value Ref Range Status   01/11/2024 26 23 - 29 mmol/L Final     Glucose   Date Value Ref Range Status   01/11/2024 89 70 - 110 mg/dL Final     BUN   Date Value Ref Range Status   01/11/2024 18 6 - 20 mg/dL Final     Creatinine   Date Value Ref Range Status   01/11/2024 1.0 0.5 - 1.4 mg/dL Final     Calcium   Date Value Ref Range Status   01/11/2024 10.1 8.7 - 10.5 mg/dL Final     Total Protein   Date Value Ref Range Status   01/11/2024 7.3 6.0 - 8.4 g/dL Final     Albumin   Date Value Ref Range Status   01/11/2024 4.0 3.5 - 5.2 g/dL Final     Total Bilirubin   Date Value Ref Range Status   01/11/2024 0.2 0.1 - 1.0 mg/dL Final     Comment:     For infants and newborns, interpretation of results should be based  on gestational age, weight and in agreement with clinical  observations.    Premature Infant recommended reference ranges:  Up to 24 hours.............<8.0 mg/dL  Up to 48 hours............<12.0 mg/dL  3-5 days..................<15.0 mg/dL  6-29 days.................<15.0 mg/dL       Alkaline Phosphatase   Date Value Ref Range Status   01/11/2024 59 55 - 135 U/L Final     AST   Date Value Ref Range Status   01/11/2024 12 10 - 40 U/L Final     ALT   Date Value Ref Range Status   01/11/2024 17 10 - 44 U/L Final     Anion Gap   Date Value Ref Range Status   01/11/2024 9 8 - 16 mmol/L Final     eGFR if    Date Value Ref Range Status   03/08/2022 >60.0 >60  mL/min/1.73 m^2 Final     eGFR if non    Date Value Ref Range Status   03/08/2022 >60.0 >60 mL/min/1.73 m^2 Final     Comment:     Calculation used to obtain the estimated glomerular filtration  rate (eGFR) is the CKD-EPI equation.          Lab Results   Component Value Date    HGBA1C 6.5 (H) 11/29/2023             ASSESSMENT: 53 y.o. year old female with neck pain, consistent with     1. Cervical radiculopathy  Ambulatory referral/consult to Physical/Occupational Therapy      2. Cervical spondylosis                PLAN:   - Interventional:  None, will restart PT    Medications:  -Continue  Cymbalta 60 mg once daily  -continue tizanidine 4-8 mg every 8 hours  - continue diclofenac 1% gel to apply 2g topically up to 4 times per day PRN.   - continue lidocaine 2.5%-prilocaine 2.5% topical cream Q6H PRN topically; can alternate with Voltaren gel, which helps some.  - Patient has failed Gabapentin, Lyrica, topamax.  No NSAIDs due to reports of worsening hypertension.  -Referral previously sent to Dr. Pelon Drew for Medicinal Marijuana     - Rehabilitative: Encouraged regular exercise. Continue exercises and activities as tolerated. Will restart formal PT program at [facility]     - Diagnostic: Cervical CT and MRI reviewed     - Follow up:  8 weeks     - This condition does not require this patient to take time off of work, and the primary goal of our Pain Management services is to improve the patient's functional capacity.      - I discussed the risks, benefits, and alternatives to potential treatment options. All questions and concerns were fully addressed today in clinic.     The above plan and management options were discussed at length with patient. Patient is in agreement with the above and verbalized understanding.      Kathy Galan NP  Interventional Pain Management  [facility]    Disclaimer:  This note was prepared using voice recognition system and is likely to have sound  alike errors that may have been overlooked even after proof reading.  Please call me with any questions

## 2024-02-20 NOTE — TELEPHONE ENCOUNTER
Spoke with pt to assist with [employer] training appt coordination. Informed Rx sent to [facility] per provider Abner. Pt will contact pharm and reply when receives supplies. Successful outreach.

## 2024-02-21 ENCOUNTER — CLINICAL SUPPORT (OUTPATIENT)
Dept: REHABILITATION | Facility: HOSPITAL | Age: 54
End: 2024-02-21
Payer: MEDICAID

## 2024-02-21 ENCOUNTER — TELEPHONE (OUTPATIENT)
Dept: DIABETES | Facility: CLINIC | Age: 54
End: 2024-02-21
Payer: MEDICAID

## 2024-02-21 ENCOUNTER — OFFICE VISIT (OUTPATIENT)
Dept: PSYCHIATRY | Facility: CLINIC | Age: 54
End: 2024-02-21
Payer: MEDICAID

## 2024-02-21 ENCOUNTER — PATIENT MESSAGE (OUTPATIENT)
Dept: PSYCHIATRY | Facility: CLINIC | Age: 54
End: 2024-02-21
Payer: MEDICAID

## 2024-02-21 VITALS
BODY MASS INDEX: 40.28 KG/M2 | SYSTOLIC BLOOD PRESSURE: 135 MMHG | DIASTOLIC BLOOD PRESSURE: 87 MMHG | WEIGHT: 179.69 LBS | HEART RATE: 80 BPM

## 2024-02-21 DIAGNOSIS — R53.1 DECREASED STRENGTH, ENDURANCE, AND MOBILITY: ICD-10-CM

## 2024-02-21 DIAGNOSIS — R68.89 DECREASED STRENGTH, ENDURANCE, AND MOBILITY: ICD-10-CM

## 2024-02-21 DIAGNOSIS — F41.1 GENERALIZED ANXIETY DISORDER: Chronic | ICD-10-CM

## 2024-02-21 DIAGNOSIS — M79.672 BILATERAL FOOT PAIN: ICD-10-CM

## 2024-02-21 DIAGNOSIS — F25.0 SCHIZOAFFECTIVE DISORDER, BIPOLAR TYPE: Primary | Chronic | ICD-10-CM

## 2024-02-21 DIAGNOSIS — M25.672 DECREASED RANGE OF MOTION OF BOTH ANKLES: ICD-10-CM

## 2024-02-21 DIAGNOSIS — Z74.09 DECREASED STRENGTH, ENDURANCE, AND MOBILITY: ICD-10-CM

## 2024-02-21 DIAGNOSIS — M79.671 BILATERAL FOOT PAIN: ICD-10-CM

## 2024-02-21 DIAGNOSIS — M25.671 DECREASED RANGE OF MOTION OF BOTH ANKLES: ICD-10-CM

## 2024-02-21 DIAGNOSIS — M72.2 PLANTAR FASCIITIS, BILATERAL: Primary | ICD-10-CM

## 2024-02-21 PROCEDURE — 99999 PR PBB SHADOW E&M-EST. PATIENT-LVL III: CPT | Mod: PBBFAC,HB,, | Performed by: PSYCHOLOGIST

## 2024-02-21 PROCEDURE — 99215 OFFICE O/P EST HI 40 MIN: CPT | Mod: HP,HB,S$PBB, | Performed by: PSYCHOLOGIST

## 2024-02-21 PROCEDURE — 99213 OFFICE O/P EST LOW 20 MIN: CPT | Mod: PBBFAC | Performed by: PSYCHOLOGIST

## 2024-02-21 PROCEDURE — 3072F LOW RISK FOR RETINOPATHY: CPT | Mod: CPTII,,, | Performed by: PSYCHOLOGIST

## 2024-02-21 PROCEDURE — 4010F ACE/ARB THERAPY RXD/TAKEN: CPT | Mod: CPTII,,, | Performed by: PSYCHOLOGIST

## 2024-02-21 PROCEDURE — 1159F MED LIST DOCD IN RCRD: CPT | Mod: CPTII,,, | Performed by: PSYCHOLOGIST

## 2024-02-21 PROCEDURE — 3075F SYST BP GE 130 - 139MM HG: CPT | Mod: CPTII,,, | Performed by: PSYCHOLOGIST

## 2024-02-21 PROCEDURE — 3079F DIAST BP 80-89 MM HG: CPT | Mod: CPTII,,, | Performed by: PSYCHOLOGIST

## 2024-02-21 PROCEDURE — 97110 THERAPEUTIC EXERCISES: CPT | Performed by: PHYSICAL THERAPIST

## 2024-02-21 PROCEDURE — 3008F BODY MASS INDEX DOCD: CPT | Mod: CPTII,,, | Performed by: PSYCHOLOGIST

## 2024-02-21 RX ORDER — CARIPRAZINE 1.5 MG/1
CAPSULE, GELATIN COATED ORAL
Qty: 21 CAPSULE | Refills: 0 | Status: SHIPPED | OUTPATIENT
Start: 2024-02-21 | End: 2024-03-07

## 2024-02-21 RX ORDER — CARIPRAZINE 3 MG/1
3 CAPSULE, GELATIN COATED ORAL EVERY MORNING
Qty: 30 CAPSULE | Refills: 1 | Status: SHIPPED | OUTPATIENT
Start: 2024-03-06 | End: 2024-03-22

## 2024-02-21 RX ORDER — MIRTAZAPINE 15 MG/1
TABLET, FILM COATED ORAL
Qty: 30 TABLET | Refills: 1 | Status: SHIPPED | OUTPATIENT
Start: 2024-02-21 | End: 2024-03-22 | Stop reason: SDUPTHER

## 2024-02-21 RX ORDER — LURASIDONE HYDROCHLORIDE 20 MG/1
TABLET, FILM COATED ORAL
Qty: 21 TABLET | Refills: 0 | Status: SHIPPED | OUTPATIENT
Start: 2024-02-21 | End: 2024-03-18 | Stop reason: ALTCHOICE

## 2024-02-21 NOTE — PATIENT INSTRUCTIONS

## 2024-02-21 NOTE — PROGRESS NOTES
[facility]   Physical Therapy Treatment Note        Name: Yolanda Betts  Clinic Number: [id number]    Therapy Diagnosis:   Encounter Diagnoses   Name Primary?    Plantar fasciitis, bilateral Yes    Decreased strength, endurance, and mobility     Decreased range of motion of both ankles     Bilateral foot pain      Physician: Sasha Sorenson MD    Visit Date: 2/21/2024      Physician Orders: PT Eval and Treat  Medical Diagnosis from Referral: bilateral plantar fasciitis   Evaluation Date: 2/9/2024  Authorization Period Expiration: 1/29/2025  Plan of Care Expiration: 5/9/2024  Progress Note Due: 3/10/2024  Visit # / Visits authorized: 2/20 (+ evaluation)  FOTO: 1/3 (last performed on 2/9/2024)     Precautions: Standard and Diabetes     PTA Visit #: 1/5     Time In: 1505  Time Out: 1604  Total Billable Time: 53 minutes Billing reflects [place] medicaid guidelines, billing all therapy as therapeutic-exercise     Subjective     Patient reports: pain still present in both feet.    She was compliant with home exercise program.    Response to previous treatment: sore after evaluation    Functional change: decreased standing and walking tolerance and pain with raising up on tip toes     Pain: 8/10     Location: right heel and arch of foot (right worse than left)    Objective      Objective Measures updated at progress report or POC update only unless otherwise noted.       Treatment     Total Billed TherEx Time: (53) minutes -  Billing reflects [place] medicaid guidelines, billing all therapy as therapeutic-exercise      Yolanda received the treatments listed below:     MANUAL THERAPY TECHNIQUES were applied for (15) minutes, including:    Manual Intervention Performed Today    Soft Tissue Mobilization [x]  [] bilateral  gastrocnemius, soleus, posterior tibialis, anterior tibialis , foot intrinsics bilateral kinesiotape application bilateral arches    Joint Mobilizations [x] Anterior and  posterior mobs     []     []    Functional Dry Needling  []      Plan for Next Visit: Continue as needed         THERAPEUTIC EXERCISES to develop strength, endurance, ROM, flexibility, posture, and core stabilization for (12) minutes including:    Intervention Performed Today    Nustep      Bike x Attempted but patient requested to stop after 2 minutes   Gastrocnemius stretch  x 3 x 30 sitting with strap bilateral    Soleus stretch x 3 x 30 sitting with strap bilateral                               Plan for Next Visit:        NEUROMUSCULAR RE-EDUCATION ACTIVITIES to improve Balance, Coordination, Kinesthetic, Sense, Proprioception, and Posture for (26) minutes.  The following were included:    Intervention Performed Today    Toe yoga x 3 minutes bilateral alternating    Toe abduction/adduction x 3', bilateral LE   Toe flexion/extension x 3', bilateral LE   Ankle circles  X  x X 30 counter clockwise   X 30 clockwise    BAPS Board (added) x 2' each position (DF/PF, IV/EV, CW/CCW)                              Plan for Next Visit:        Patient Education and Home Exercises       Home Exercises Provided and Patient Education Provided     Education provided: (during session) minutes  PURPOSE: Patient educated on the impairments noted above and the effects of physical therapy intervention to improve overall condition and QOL.   EXERCISE: Patient was educated on all the above exercise prior/during/after for proper posture, positioning, and execution for safe performance with home exercise program.   STRENGTH: Patient educated on the importance of improved core and extremity strength in order to improve alignment of the spine and extremities with static positions and dynamic movement.     Written Home Exercises Provided: yes.  Exercises were reviewed and Yolanda was able to demonstrate them prior to the end of the session.  Yolanda demonstrated good  understanding of the education provided. See EMR under Patient Instructions for  exercises provided during therapy sessions.    Assessment     Patient did well with treatment today. Attempted recumbent bike today, but patient requested to stop after approximately two minutes due to overall pain levels. However, she did well with the rest of exercises performed today. Added more toe intrinsic strengthening as well as BAPS board for mobility and stability. Good relief noted with manual therapy today.     Yolanda is progressing well towards her goals.   Patient prognosis is Good.     Patient will continue to benefit from skilled outpatient physical therapy to address the deficits listed in the problem list box on initial evaluation, provide pt/family education and to maximize patient's level of independence in the home and community environment.     Patient's spiritual, cultural and educational needs considered and pt agreeable to plan of care and goals.       Anticipated Barriers for therapy: co-morbidities           Short Term Goals:  6 weeks Status  Date Met   PAIN: Pt will report worst pain of 5/10 in order to progress toward max functional ability and improve quality of life. [x] Progressing  [] Met  [] Not Met     FUNCTION: Patient will demonstrate improved function as indicated by a score of greater than or equal to 22 out of 100 on FOTO. [x] Progressing  [] Met  [] Not Met     STRENGTH: Patient will improve strength to 50% of stated goals, listed in objective measures above, in order to progress towards independence with functional activities. [x] Progressing  [] Met  [] Not Met     POSTURE: Patient will correct postural deviations in sitting and standing, to decrease pain and promote long term stability.  [x] Progressing  [] Met  [] Not Met     GAIT: Patient will demonstrate improved gait mechanics in order to improve functional mobility, improve quality of life, and decrease risk of further injury or fall.  [x] Progressing  [] Met  [] Not Met     HEP: Patient will demonstrate independence  with HEP in order to progress toward functional independence. [x] Progressing  [] Met  [] Not Met        Long Term Goals:  12 weeks Status Date Met   PAIN: Pt will report worst pain of 3/10 in order to progress toward max functional ability and improve quality of life [x] Progressing  [] Met  [] Not Met     FUNCTION: Patient will demonstrate improved function as indicated by a score of greater than or equal to 34 out of 100 on FOTO. [x] Progressing  [] Met  [] Not Met     MOBILITY: Patient will improve AROM to stated goals, listed in objective measures above, in order to return to maximal functional potential and improve quality of life.  [x] Progressing  [] Met  [] Not Met     STRENGTH: Patient will improve strength to stated goals, listed in objective measures above, in order to improve functional independence and quality of life.  [x] Progressing  [] Met  [] Not Met     GAIT: Patient will demonstrate normalized gait mechanics with minimal compensation in order to return to PLOF. [x] Progressing  [] Met  [] Not Met     Patient will return to normal ADL's, IADL's, community involvement, recreational activities, and work-related activities with less than or equal to 1/10 pain and maximal function.  [x] Progressing  [] Met  [] Not Met          Plan     Continue Plan of Care (POC) and progress per patient tolerance. See treatment section for details on planned progressions next session.      Winifred Blanco, PT

## 2024-02-21 NOTE — PROGRESS NOTES
"Outpatient Psychiatry Follow-Up Visit    2/21/2024      Chief Complaint:  Yolanda Betts is a 53 y.o. female who presents today for follow-up of mood and anxiety.       Impressions/Plan from last visit: Yolanda attended her visit. Nurse said that when Yolanda went to the clinic yesterday "to get the papers signed," she initially said that she was going out of town today. System shows an ortho appt later today. She also reportedly stated that she had only been to the [facility] one time. Yolanda said that she only went to [facility] one time because she has been in pain and has had a difficult time getting up and moving around--"this week I've been doing good so far."    [facility]  [phone]    She said that she went to [facility] 3 times--the lady with whom she originally talked reportedly quit and they did not have her papers. She then said that she went 3 times. She says today that the reason that she has not been going to [facility] is because of physical pain. She reported that she plans to return.    Behavioral health symptoms--"the depression, the anxiety, panic attacks"; she reported hearing voices--"I have to have ambient noises so that I don't hear them. It's all day, continuously." She reported that she hears "conversations, words"--sometimes can follow them. She denied that there are times when she does not hear them. She said that she is "always depressed." She is "always down, angry, argumentantative." She reported having panic but does not know what triggers it--"like right now, it feels like somebody is sitting on my chest. I feel trembly inside. I feel shakey." Those symptoms are "all the time." There are other times when symptoms are worse and she has to lie down; take deep breaths. Sometimes, those last an hour. She sometimes calls her daughter and has a distraction.    Prior to the visit, provider tried calling for Feroz Stanley, therapist, but got his voicemail and left a message.    Yolanda has transportation for " "[facility]--has had multiple appts--"back to back" and has not been able to go.    We completed the form for her work disability--see uploaded.     She is now on Remeron for sleep; no longer on Ambien. She is also on Cymbalta 60 mg bid with another provider.    Plan--continue Lamictal 150 mg; Latuda 80 mg; Xanax 1 mg bid; others by other providers    Interval History and Content of Current Session: Yolanda attended her visit. She reported that she is still not leaving her house; feeling "more and more depressed." She has been anxious--lives alone. She is in her bed most of the time--stays in the bed, watches TV. She reported that she is not sleeping well at night--only for a few hours sometimes and denied taking naps during the day. She has not been in IOP but has been seeing her therapist weekly. She has been active in her [employer]--is doing that, though she feels overwhelmed with crowds.  We talked about her pushing herself to engage in activities even when anxious; if she needs to remove herself immediately from the situation to calm down, she is to return back after she is calmer.  We also discussed movement exercise, including walking.  Encouraged her to make all of her appointments in person so she is forced to get out of the house and walked to her car, etc. She has been on Latuda for an extended period without much improvement.  We agreed to cross titrate to Vraylar and continue to monitor.  She denied any involuntary movements, and none were observed during this visit.  See plan below.    Plan--continue Lamictal 200 mg bid; mirtazepine 15 mg hs; Xanax 1 mg bid; decrease Latuda 40 mg for one week, while starting Vraylar 1.5 mg; then decrease Latuda 20 mg for a week, while increasing Vraylar 3 mg; then stop Latuda and continue Vraylar 3 mg; exercise/movement daily; book club     since October " "2023.        ----------------------------------------------------------------------------------------------------------  Therapist: Freoz Stanley LCSW with [facility]   Ph: [phone]  Fax: [phone]    Prior medicines: Prozac, Wellbutrin, Paxil, Lamictal, Lexapro, Celexa, Cymbalta, Remeron, Abilify, Seroquel (raised blood sugars), Risperdal, Restoril, Ambien (groggy), Ambien CR, trazodone, Xanax, clonazepam        9/14/2023     1:01 PM 6/12/2023    11:18 AM 2/24/2023     7:08 AM   GAD7   1. Feeling nervous, anxious, or on edge? 3 3 1   2. Not being able to stop or control worrying? 3 3 1   3. Worrying too much about different things? 3 3 1   4. Trouble relaxing? 3 3 1   5. Being so restless that it is hard to sit still? 3 3 2   6. Becoming easily annoyed or irritable? 3 3 1   7. Feeling afraid as if something awful might happen? 3 0 1   JOANN-7 Score 21 18 8      0-4 = Minimal anxiety  5-9 = Mild anxiety  10-14 = Moderate anxiety  15-21 = Severe anxiety       Review of Systems   PSYCHIATRIC: Pertinant items are noted in the narrative.    Past Medical, Family and Social History: The patient's past medical, family and social history have been reviewed and updated as appropriate within the electronic medical record - see encounter notes.      Current Outpatient Medications:     ALPRAZolam (XANAX) 1 MG tablet, Take 1 tablet (1 mg total) by mouth 2 (two) times daily as needed for Anxiety., Disp: 60 tablet, Rfl: 2    amlodipine-valsartan (EXFORGE)  mg per tablet, Take 1 tablet by mouth once daily., Disp: 90 tablet, Rfl: 3    aspirin (ECOTRIN) 81 MG EC tablet, Take 1 tablet by mouth daily, Disp: 30 tablet, Rfl: 0    atorvastatin (LIPITOR) 20 MG tablet, Take 1 tablet (20 mg total) by mouth every evening, Disp: 90 tablet, Rfl: 3    BD INSULIN SYRINGE ULTRA-FINE 1/2 mL 30 gauge x 1/2" Syrg, , Disp: , Rfl: 0    blood-glucose sensor (DEXCOM G7 SENSOR) Mariela, Use to check bg. Change every 10 " days, Disp: 3 each, Rfl: 11    cariprazine (VRAYLAR) 1.5 mg Cap, Take 1 capsule (1.5 mg total) by mouth every morning for 7 days, THEN 2 capsules (3 mg total) every morning for 7 days., Disp: 21 capsule, Rfl: 0    [START ON 3/6/2024] cariprazine (VRAYLAR) 3 mg Cap, Take 1 capsule (3 mg total) by mouth every morning., Disp: 30 capsule, Rfl: 1    DULoxetine (CYMBALTA) 60 MG capsule, Take 1 capsule (60 mg total) by mouth 2 (two) times daily., Disp: 180 capsule, Rfl: 3    ergocalciferol (VITAMIN D2) 50,000 unit Cap, Take 1 capsule twice weekly for 3 weeks then weekly, Disp: 12 capsule, Rfl: 3    estradioL (ESTRACE) 0.01 % (0.1 mg/gram) vaginal cream, Place 1 g vaginally twice a week., Disp: 42.5 g, Rfl: 3    fenofibrate (TRICOR) 145 MG tablet, Take 1 tablet (145 mg total) by mouth once daily., Disp: 90 tablet, Rfl: 3    fluticasone propionate (FLONASE) 50 mcg/actuation nasal spray, 2 sprays (100 mcg total) by Each Nare route once daily., Disp: 16 g, Rfl: 11    frovatriptan (FROVA) 2.5 MG tablet, Take 1 tablet at onset of acute migraine. May take 1 more tablet 2 hours later if needed. (MAX 2 TABLET PER DAY. MAX 4 TABLETS PER WEEK.), Disp: 9 tablet, Rfl: 1    furosemide (LASIX) 20 MG tablet, Take 1 tablet (20 mg total) by mouth once daily., Disp: 90 tablet, Rfl: 3    glucagon (BAQSIMI) 3 mg/actuation Spry, 1 spray by Nasal route as needed (hypoglycemia). For emergency use. May repeat 1 time after 15 minutes, Disp: 2 each, Rfl: 1    insulin aspart U-100 (NOVOLOG FLEXPEN U-100 INSULIN) 100 unit/mL (3 mL) InPn pen, Inject 24 Units into the skin 3 (three) times daily before meals. Use per sliding scale for breakfast and dinner, Disp: 45 mL, Rfl: 1    insulin aspart U-100 (NOVOLOG U-100 INSULIN ASPART) 100 unit/mL injection, Inject 100 Units into the skin Every 3 (three) days. To use via insulin pump, Disp: 10 mL, Rfl: 5    insulin glargine, TOUJEO, (TOUJEO SOLOSTAR U-300 INSULIN) 300 unit/mL (1.5 mL) InPn pen, Inject 16 Units  "into the skin every evening., Disp: 3 pen , Rfl: 1    insulin pump cart,auto,BT-cntr (OMNIPOD 5 G6 INTRO KIT, GEN 5,) Crtg, Inject 1 each into the skin once. for 1 dose, Disp: 1 each, Rfl: 0    insulin pump cart,automated,BT (OMNIPOD 5 G6 PODS, GEN 5,) Crtg, Inject 1 each into the skin Every 3 (three) days., Disp: 30 each, Rfl: 3    lamoTRIgine (LAMICTAL) 200 MG tablet, Take 1 tablet (200 mg total) by mouth 2 (two) times daily., Disp: 60 tablet, Rfl: 2    levocetirizine (XYZAL) 5 MG tablet, Take 1 tablet (5 mg total) by mouth every evening., Disp: 30 tablet, Rfl: 11    lifitegrast (XIIDRA) 5 % Dpet, Place 1 drop into both eyes 2 (two) times daily., Disp: 60 each, Rfl: 12    lurasidone (LATUDA) 20 mg Tab tablet, Take 2 tablets (40 mg total) by mouth once daily for 7 days, THEN 1 tablet (20 mg total) once daily for 7 days., Disp: 21 tablet, Rfl: 0    magnesium oxide (MAG-OX) 400 mg (241.3 mg magnesium) tablet, Take 1 tablet (400 mg total) by mouth once daily., Disp: 90 tablet, Rfl: 0    metFORMIN (GLUCOPHAGE) 1000 MG tablet, Take 1 tablet (1,000 mg total) by mouth 2 (two) times daily with meals., Disp: 180 tablet, Rfl: 1    metoprolol succinate (TOPROL-XL) 25 MG 24 hr tablet, Take 1 tablet (25 mg total) by mouth once daily., Disp: 90 tablet, Rfl: 3    mirtazapine (REMERON) 15 MG tablet, Take 1 tablet by mouth at bedtime., Disp: 30 tablet, Rfl: 1    omeprazole (PRILOSEC) 40 MG capsule, Take 1 capsule (40 mg total) by mouth once daily., Disp: 30 capsule, Rfl: 11    pen needle, diabetic (BD ULTRA-FINE MINI PEN NEEDLE) 31 gauge x 3/16" Ndle, Use one needle 5 times a day, Disp: 200 each, Rfl: 11    pen needle, diabetic (BD ULTRA-FINE MINI PEN NEEDLE) 31 gauge x 3/16" Ndle, Use 5 a day, Disp: 200 each, Rfl: 11    secukinumab (COSENTYX PEN, 2 PENS,) 150 mg/mL PnIj, Inject 300 mg (2 mL) into the skin every 28 days., Disp: 2 mL, Rfl: 6    spironolactone (ALDACTONE) 25 MG tablet, Take 1 tablet (25 mg total) by mouth once " "daily., Disp: 90 tablet, Rfl: 3    tiZANidine (ZANAFLEX) 4 MG tablet, Take 2 tablets (8 mg total) by mouth every 6 (six) hours as needed (Muscle spasms)., Disp: 240 tablet, Rfl: 0  No current facility-administered medications for this visit.    Facility-Administered Medications Ordered in Other Visits:     ondansetron injection 4 mg, 4 mg, Intravenous, Once PRN, Otto Phillips MD    Compliance: yes    Side effects: see above    Risk Parameters:  Patient reports no suicidal ideation  Patient reports no homicidal ideation  Patient reports no self-injurious behavior  Patient reports no violent behavior    Exam (detailed: at least 9 elements; comprehensive: all 15 elements)   Constitutional  Vitals:  Most recent vital signs were reviewed.   Last 3 sets of Vitals        2/6/2024    12:28 PM 2/14/2024    10:07 AM 2/21/2024     8:46 AM   Vitals - 1 value per visit   SYSTOLIC  132 135   DIASTOLIC  88 87   Pulse  76 80   Temp  97.9 °F (36.6 °C)    SPO2  99 %    Weight (lb) 183.42 181.2 179.68   Weight (kg) 83.2 82.192 81.5   Height  4' 8" (1.422 m)    BMI (Calculated)  40.6           General:  age appropriate, casually dressed, neatly groomed, wearing glasses     Musculoskeletal  Muscle Strength/Tone:  no tremor, no tic   Gait & Station:  non-ataxic     Psychiatric  Speech:  no latency; no press, soft   Behavior: wnl   Mood & Affect:  anxious, depressed  blunted   Thought Process:  normal and logical   Associations:  intact   Thought Content:  No change from last visit--denied suicidal ideation--unclear about hallucinations--reports hearing voices   Insight:  has awareness of illness   Judgement: behavior is adequate to circumstances   Orientation:  grossly intact   Memory: intact for content of interview   Language: grossly intact   Attention Span & Concentration:  Grossly intact   Fund of Knowledge:  intact and appropriate to age and level of education     Assessment and Diagnosis   Status/Progress: Based on the " "examination today, the patient's problem(s) is/are treatment resistant and failing to respond as expected to treatment.  New problems have not been presented today.   Co-morbidities and psychosocial stressors  are complicating management of the primary condition.  The working differential for this patient includes schizoaffective vs bipolar.     General Impression:     Encounter Diagnoses   Name Primary?    Schizoaffective disorder, bipolar type Yes    Generalized anxiety disorder        Intervention/Counseling/Treatment Plan   Medication Management: Discussed risks, benefits, and alternatives to treatment plan documented above with patient. I answered all patient questions related to this plan, and patient expressed understanding and agreement.   continue Lamictal 200 mg bid; mirtazepine 15 mg hs; Xanax 1 mg bid; decrease Latuda 40 mg for one week, while starting Vraylar 1.5 mg; then decrease Latuda 20 mg for a week, while increasing Vraylar 3 mg; then stop Latuda and continue Vraylar 3 mg  Continue with therapy    exercise/movement daily; book club      Medication List with Changes/Refills   New Medications    CARIPRAZINE (VRAYLAR) 1.5 MG CAP    Take 1 capsule (1.5 mg total) by mouth every morning for 7 days, THEN 2 capsules (3 mg total) every morning for 7 days.    CARIPRAZINE (VRAYLAR) 3 MG CAP    Take 1 capsule (3 mg total) by mouth every morning.   Current Medications    ALPRAZOLAM (XANAX) 1 MG TABLET    Take 1 tablet (1 mg total) by mouth 2 (two) times daily as needed for Anxiety.    AMLODIPINE-VALSARTAN (EXFORGE)  MG PER TABLET    Take 1 tablet by mouth once daily.    ASPIRIN (ECOTRIN) 81 MG EC TABLET    Take 1 tablet by mouth daily    ATORVASTATIN (LIPITOR) 20 MG TABLET    Take 1 tablet (20 mg total) by mouth every evening    BD INSULIN SYRINGE ULTRA-FINE 1/2 ML 30 GAUGE X 1/2" SYRG        BLOOD-GLUCOSE SENSOR (DEXCOM G7 SENSOR) NHI    Use to check bg. Change every 10 days    DULOXETINE (CYMBALTA) 60 " MG CAPSULE    Take 1 capsule (60 mg total) by mouth 2 (two) times daily.    ERGOCALCIFEROL (VITAMIN D2) 50,000 UNIT CAP    Take 1 capsule twice weekly for 3 weeks then weekly    ESTRADIOL (ESTRACE) 0.01 % (0.1 MG/GRAM) VAGINAL CREAM    Place 1 g vaginally twice a week.    FENOFIBRATE (TRICOR) 145 MG TABLET    Take 1 tablet (145 mg total) by mouth once daily.    FLUTICASONE PROPIONATE (FLONASE) 50 MCG/ACTUATION NASAL SPRAY    2 sprays (100 mcg total) by Each Nare route once daily.    FROVATRIPTAN (FROVA) 2.5 MG TABLET    Take 1 tablet at onset of acute migraine. May take 1 more tablet 2 hours later if needed. (MAX 2 TABLET PER DAY. MAX 4 TABLETS PER WEEK.)    FUROSEMIDE (LASIX) 20 MG TABLET    Take 1 tablet (20 mg total) by mouth once daily.    GLUCAGON (BAQSIMI) 3 MG/ACTUATION SPRY    1 spray by Nasal route as needed (hypoglycemia). For emergency use. May repeat 1 time after 15 minutes    INSULIN ASPART U-100 (NOVOLOG FLEXPEN U-100 INSULIN) 100 UNIT/ML (3 ML) INPN PEN    Inject 24 Units into the skin 3 (three) times daily before meals. Use per sliding scale for breakfast and dinner    INSULIN ASPART U-100 (NOVOLOG U-100 INSULIN ASPART) 100 UNIT/ML INJECTION    Inject 100 Units into the skin Every 3 (three) days. To use via insulin pump    INSULIN GLARGINE, TOUJEO, (TOUJEO SOLOSTAR U-300 INSULIN) 300 UNIT/ML (1.5 ML) INPN PEN    Inject 16 Units into the skin every evening.    INSULIN PUMP CART,AUTO,BT-CNTR (OMNIPOD 5 G6 INTRO KIT, GEN 5,) CRTG    Inject 1 each into the skin once. for 1 dose    INSULIN PUMP CART,AUTOMATED,BT (OMNIPOD 5 G6 PODS, GEN 5,) CRTG    Inject 1 each into the skin Every 3 (three) days.    LAMOTRIGINE (LAMICTAL) 200 MG TABLET    Take 1 tablet (200 mg total) by mouth 2 (two) times daily.    LEVOCETIRIZINE (XYZAL) 5 MG TABLET    Take 1 tablet (5 mg total) by mouth every evening.    LIFITEGRAST (XIIDRA) 5 % DPET    Place 1 drop into both eyes 2 (two) times daily.    MAGNESIUM OXIDE (MAG-OX) 400 MG  "(241.3 MG MAGNESIUM) TABLET    Take 1 tablet (400 mg total) by mouth once daily.    METFORMIN (GLUCOPHAGE) 1000 MG TABLET    Take 1 tablet (1,000 mg total) by mouth 2 (two) times daily with meals.    METOPROLOL SUCCINATE (TOPROL-XL) 25 MG 24 HR TABLET    Take 1 tablet (25 mg total) by mouth once daily.    OMEPRAZOLE (PRILOSEC) 40 MG CAPSULE    Take 1 capsule (40 mg total) by mouth once daily.    PEN NEEDLE, DIABETIC (BD ULTRA-FINE MINI PEN NEEDLE) 31 GAUGE X 3/16" NDLE    Use one needle 5 times a day    PEN NEEDLE, DIABETIC (BD ULTRA-FINE MINI PEN NEEDLE) 31 GAUGE X 3/16" NDLE    Use 5 a day    SECUKINUMAB (COSENTYX PEN, 2 PENS,) 150 MG/ML PNIJ    Inject 300 mg (2 mL) into the skin every 28 days.    SPIRONOLACTONE (ALDACTONE) 25 MG TABLET    Take 1 tablet (25 mg total) by mouth once daily.    TIZANIDINE (ZANAFLEX) 4 MG TABLET    Take 2 tablets (8 mg total) by mouth every 6 (six) hours as needed (Muscle spasms).   Changed and/or Refilled Medications    Modified Medication Previous Medication    LURASIDONE (LATUDA) 20 MG TAB TABLET lurasidone (LATUDA) 80 mg Tab tablet       Take 2 tablets (40 mg total) by mouth once daily for 7 days, THEN 1 tablet (20 mg total) once daily for 7 days.    Take 1 tablet (80 mg total) by mouth once daily.    MIRTAZAPINE (REMERON) 15 MG TABLET mirtazapine (REMERON) 15 MG tablet       Take 1 tablet by mouth at bedtime.    Take 1 tablet by mouth at bedtime.   Discontinued Medications    BLOOD SUGAR DIAGNOSTIC (ONETOUCH ULTRA TEST) STRP    1 each by Misc.(Non-Drug; Combo Route) route 4 (four) times daily. [facility] Glucose Test Strips    BLOOD SUGAR DIAGNOSTIC (ONETOUCH ULTRA TEST) STRP    Check blood glucose 4 times daily as directed and as needed (dispense insurance preferred brand or patient choice)    DICYCLOMINE (BENTYL) 10 MG CAPSULE    Take 1 capsule (10 mg total) by mouth before meals and at bedtime as needed (abdominal pain).    FLASH GLUCOSE SCANNING READER (Epitiro AMY 2 READER) " MISC    Use as directed to check blood sugar    LANCETS (ONETOUCH DELICA PLUS LANCET) 30 GAUGE MISC    Use as directed 3 times daily    MONTELUKAST (SINGULAIR) 10 MG TABLET    Take 10 mg by mouth.    ONDANSETRON (ZOFRAN-ODT) 4 MG TBDL    Take 1 tablet (4 mg total) by mouth every 8 (eight) hours as needed for nausea and vomiting    PNEUMOC 20-RAY CONJ-DIP CR,PF, (PREVNAR-20, PF,) 0.5 ML SYRG INJECTION    Inject 0.5 mLs into the muscle.    PROMETHAZINE (PHENERGAN) 12.5 MG TAB        PRUCALOPRIDE (MOTEGRITY) 2 MG TAB    Take 1 tablet (2 mg) by mouth once daily.    TRAMADOL (ULTRAM) 50 MG TABLET    Take 1 tablet (50 mg total) by mouth every 6 (six) hours as needed for Pain.    TRAMADOL (ULTRAM) 50 MG TABLET    Take 1 tablet (50 mg total) by mouth every 6 (six) hours as needed for pain        Return to Clinic: 1 month    Time spent with pt including note preparation and paperwork: 38 minutes       Mariam Young, PhD, MP  Advanced Practice Medical Psychologist  [facility]  [address] [facility].  [place] [id number]  [phone] ph  [phone] fax

## 2024-02-21 NOTE — TELEPHONE ENCOUNTER
Spoke with patient. Assisted in coord OmniPod5 training visit per her preferences. Reviewed supplies to bring including Dexcom G6, rapid acting insulin vial. Pt verbalized understanding. Successful outreach.

## 2024-02-26 ENCOUNTER — CLINICAL SUPPORT (OUTPATIENT)
Dept: DIABETES | Facility: CLINIC | Age: 54
End: 2024-02-26
Payer: MEDICAID

## 2024-02-26 ENCOUNTER — TELEPHONE (OUTPATIENT)
Dept: PAIN MEDICINE | Facility: CLINIC | Age: 54
End: 2024-02-26
Payer: MEDICAID

## 2024-02-26 ENCOUNTER — OFFICE VISIT (OUTPATIENT)
Dept: PAIN MEDICINE | Facility: CLINIC | Age: 54
End: 2024-02-26
Payer: MEDICAID

## 2024-02-26 ENCOUNTER — TELEPHONE (OUTPATIENT)
Dept: REHABILITATION | Facility: HOSPITAL | Age: 54
End: 2024-02-26
Payer: MEDICAID

## 2024-02-26 ENCOUNTER — TELEPHONE (OUTPATIENT)
Dept: DIABETES | Facility: CLINIC | Age: 54
End: 2024-02-26
Payer: MEDICAID

## 2024-02-26 DIAGNOSIS — M54.12 CERVICAL RADICULOPATHY: Primary | ICD-10-CM

## 2024-02-26 DIAGNOSIS — E11.42 DM TYPE 2 WITH DIABETIC PERIPHERAL NEUROPATHY: Primary | ICD-10-CM

## 2024-02-26 DIAGNOSIS — M47.812 CERVICAL SPONDYLOSIS: ICD-10-CM

## 2024-02-26 DIAGNOSIS — E11.69 TYPE 2 DIABETES MELLITUS WITH OTHER SPECIFIED COMPLICATION, UNSPECIFIED WHETHER LONG TERM INSULIN USE: Primary | ICD-10-CM

## 2024-02-26 PROCEDURE — 3072F LOW RISK FOR RETINOPATHY: CPT | Mod: CPTII,95,, | Performed by: NURSE PRACTITIONER

## 2024-02-26 PROCEDURE — 4010F ACE/ARB THERAPY RXD/TAKEN: CPT | Mod: CPTII,95,, | Performed by: NURSE PRACTITIONER

## 2024-02-26 PROCEDURE — 99213 OFFICE O/P EST LOW 20 MIN: CPT | Mod: 95,,, | Performed by: NURSE PRACTITIONER

## 2024-02-26 PROCEDURE — 99999PBSHW PR PBB SHADOW TECHNICAL ONLY FILED TO HB: Mod: PBBFAC,,,

## 2024-02-26 PROCEDURE — G0108 DIAB MANAGE TRN  PER INDIV: HCPCS | Mod: PBBFAC

## 2024-02-26 RX ORDER — PEN NEEDLE, DIABETIC 29 G X1/2"
1 NEEDLE, DISPOSABLE MISCELLANEOUS
Qty: 100 EACH | Refills: 2 | Status: SHIPPED | OUTPATIENT
Start: 2024-02-26

## 2024-02-26 RX ORDER — BLOOD-GLUCOSE SENSOR
1 EACH MISCELLANEOUS
Qty: 3 EACH | Refills: 11 | Status: SHIPPED | OUTPATIENT
Start: 2024-02-26 | End: 2025-02-25

## 2024-02-26 RX ORDER — BLOOD-GLUCOSE TRANSMITTER
1 EACH MISCELLANEOUS
Qty: 1 EACH | Refills: 3 | Status: SHIPPED | OUTPATIENT
Start: 2024-02-26 | End: 2025-02-25

## 2024-02-26 NOTE — TELEPHONE ENCOUNTER
Called to check on patient. She states that she forgot about the appointment this morning and will reschedule.   Winifred Garrett, PT, DPT

## 2024-02-26 NOTE — PROGRESS NOTES
Diabetes Care Specialist Progress Note  Author: Mirlande Lee RN  Date: 2/26/2024    Program Intake  Reason for Diabetes Program Visit:: Intervention  Type of Intervention:: Individual  Individual: Device Training  Device Training: Insulin Pump Start  Current diabetes risk level:: high  In the last 12 months, have you:: been admitted to a hospital  Was the ER or hospital admission related to diabetes?: No  Permission to speak with others about care:: no    Lab Results   Component Value Date    HGBA1C 6.5 (H) 11/29/2023     Assessment Summary and Plan    Based on today's diabetes care assessment, the following areas of need were identified:          2/6/2024    12:01 AM   Social   Support No   Access to Mass Media/Tech No   Cognitive/Behavioral Health No   Culture/Yazidi No   Communication No   Health Literacy No            2/6/2024    12:01 AM   Clinical   Medication Adherence No   Lab Compliance No   Nutritional Status No            2/6/2024    12:01 AM   Diabetes Self-Management Skills   Medication Yes-See care plan for Omnipod 5 training.             Today's interventions were provided through individual discussion, instruction, and written materials were provided.      Patient verbalized understanding of instruction and written materials.  Pt was able to return back demonstration of instructions today. Patient understood key points, needs reinforcement and further instruction.     Diabetes Self-Management Care Plan:    Today's Diabetes Self-Management Care Plan was developed with Yolanda's input. Yolanda has agreed to work toward the following goal(s) to improve his/her overall diabetes control.      Care Plan: Diabetes Management   Updates made since 1/27/2024 12:00 AM        Problem: Medications         Goal: Patient Agrees to take Diabetes Medication(s) as prescribed.    Start Date: 2/6/2024   Expected End Date: 2/5/2025   This Visit's Progress: On track   Priority: High   Barriers: No Barriers Identified    Note:    Discussed pre-pump topics:   Pump options w/ compatible CGM: Medtronic using Guardian, OmniPod and Tandem using Dexcom G6  Common terms: basal/bolus insulin, IC, ISF, TBS, IOB  Pros/cons pump therapy, supplies needed, frequency of changing infusion sets and cartridges/pods, and backup pump plan   Allowed pt to see demo pumps and example infusion set    Reviewed need for carb counting accuracy prior to pump initiation. Provided carb counting training using food lists, food label from Diabetes Management Guide. Also, encouraged pt to download and use [employer] gill for additional support.     Pt demonstrated understanding carb ctg using clinic examples and would like to proceed w/ OmniPod5 and Dexcom G6. She agrees to contact clinic for training appt once devices received. Updates forwarded to provider Abner and encouraged upcoming provider visit for medical mgmt.     2/26/24:     OMNIPOD 5 INSULIN PUMP START  Pump training was provided per Omni Pod protocol.     Patient understands she will no longer take Toujeo injections daily.  Details of pump therapy were covered included following: controller features and programming, pod activation, pod site selection and rotation, automatic pod priming and insertion, setting & editing basal rates in manual mode, giving bolus and other features in the set up menu.  Patient demonstrated ability to program controller, activate and insert pod using aseptic technique.  Patient demonstrated ability to program Dexcom transmitter into controller and start automated limited mode.    Instructed pt on use of basic pump features ie...give a bolus, pause insulin, switch from manual to automated mode.  Reviewed features available in manual mode verses automated mode.   Reviewed when and how to use activity function in automated mode.  Reviewed site selection of pods, rotation of sites and hard stop to change pod every 72 hrs.   Instructed that insulin vial is good out of  refrigeration for up to 28-30 days .   Reviewed treatment of hypoglycemia, hyperglycemia; sick day care, DKA, and troubleshooting of pump.  Omni Pod 24 hour support can be reached at [phone].     INITIAL SETTINGS: (per provider Elizabeth Ceron NP)    Basal rate: 0.4 units/hr  Maximum basal: 2 units/hr     Bolus Menu:  ISF: 1:89 mg/dL  Carb Ratio: 1:24 grams  Blood glucose target: 120 mg/dL; correct above: 130 mg/dL  Active insulin: 3 hrs  Maximum bolus: 25 units  Reverse Correction: Off    Low pod insulin: 20 units  Pod expiration alarm:  4 hours    Podder ID username: [email or web address]    Janell username: [email or web address]    Patient has written materials for Omnipod 5 for home use.  Patient verbalized understanding of all instructions given.  Reviewed back up plan in case of pump malfunction.  Educated that if glucose levels increasing and patient is delivering insulin, it is recommended to change pod.             Task: Reviewed with patient all current diabetes medications and provided basic review of the purpose, dosage, frequency, side effects, and storage of both oral and injectable diabetes medications. Completed 2/6/2024        Task: Discussed guidelines for preventing, detecting and treating hypoglycemia and hyperglycemia and reviewed the importance of meal and medication timing with diabetes mediations for prevention of hypoglycemia and maximum drug benefit. Completed 2/6/2024          Follow Up Plan     Follow up in about 2 weeks (around 3/11/2024) for Omnipod 5 follow-up.    Today's care plan and follow up schedule was discussed with patient.  Yolanda verbalized understanding of the care plan, goals, and agrees to follow up plan.        The patient was encouraged to communicate with his/her health care provider/physician and care team regarding his/her condition(s) and treatment.  I provided the patient with my contact information today and encouraged to contact me via phone or [facility] Patient Portal as  needed.     Length of Visit   Total Time: 90 Minutes

## 2024-02-26 NOTE — TELEPHONE ENCOUNTER
----- Message from Alex Londono MA sent at 2/26/2024 10:55 AM CST -----  Type: Patient Call Back    Who called: Self    What is the request in detail: pt. Needs her Dexcom & syringes called in.. she's asking for a nurse to call her please .     Can the clinic reply by [facility]?No    Would the patient rather a call back or a response via [facility]? Yes, call     Best call back number: [phone] (home)

## 2024-02-26 NOTE — TELEPHONE ENCOUNTER
Pt is starting omnipod and having training today and needs g6 sensors sent to [facility] and she needs syringes

## 2024-02-27 ENCOUNTER — DOCUMENTATION ONLY (OUTPATIENT)
Dept: REHABILITATION | Facility: HOSPITAL | Age: 54
End: 2024-02-27
Payer: MEDICAID

## 2024-02-27 ENCOUNTER — TELEPHONE (OUTPATIENT)
Dept: ORTHOPEDICS | Facility: CLINIC | Age: 54
End: 2024-02-27
Payer: MEDICAID

## 2024-02-27 DIAGNOSIS — Z98.890 S/P BILATERAL CARPAL TUNNEL RELEASE: ICD-10-CM

## 2024-02-27 DIAGNOSIS — G56.03 BILATERAL CARPAL TUNNEL SYNDROME: Primary | ICD-10-CM

## 2024-02-27 NOTE — TELEPHONE ENCOUNTER
----- Message from Britni Vitale MA sent at 2/27/2024  1:14 PM CST -----  Can you order PT for this patient   ----- Message -----  From: Ni Mckeon  Sent: 2/27/2024  12:05 PM CST  To: [facility] Lozada Staff    .Type:  Needs Medical Advice    Who Called: pt    Would the patient rather a call back or a response via [facility]? Call back  Best Call Back Number: [phone]  Additional Information:     Pt stated she would like a call back to start physical therapy on her hand

## 2024-02-28 ENCOUNTER — OFFICE VISIT (OUTPATIENT)
Dept: OPHTHALMOLOGY | Facility: CLINIC | Age: 54
End: 2024-02-28
Payer: MEDICAID

## 2024-02-28 ENCOUNTER — TELEPHONE (OUTPATIENT)
Dept: RHEUMATOLOGY | Facility: CLINIC | Age: 54
End: 2024-02-28
Payer: MEDICAID

## 2024-02-28 ENCOUNTER — PATIENT MESSAGE (OUTPATIENT)
Dept: DIABETES | Facility: CLINIC | Age: 54
End: 2024-02-28
Payer: MEDICAID

## 2024-02-28 DIAGNOSIS — H52.213 IRREGULAR ASTIGMATISM OF BOTH EYES: ICD-10-CM

## 2024-02-28 DIAGNOSIS — H04.129 DRY EYE: Primary | ICD-10-CM

## 2024-02-28 DIAGNOSIS — H52.13 MYOPIA WITH PRESBYOPIA, BILATERAL: ICD-10-CM

## 2024-02-28 DIAGNOSIS — H52.4 MYOPIA WITH PRESBYOPIA, BILATERAL: ICD-10-CM

## 2024-02-28 PROCEDURE — 1159F MED LIST DOCD IN RCRD: CPT | Mod: CPTII,,, | Performed by: OPTOMETRIST

## 2024-02-28 PROCEDURE — 4010F ACE/ARB THERAPY RXD/TAKEN: CPT | Mod: CPTII,,, | Performed by: OPTOMETRIST

## 2024-02-28 PROCEDURE — 92025 CPTRIZED CORNEAL TOPOGRAPHY: CPT | Mod: PBBFAC | Performed by: OPTOMETRIST

## 2024-02-28 PROCEDURE — 99999 PR PBB SHADOW E&M-EST. PATIENT-LVL IV: CPT | Mod: PBBFAC,,, | Performed by: OPTOMETRIST

## 2024-02-28 PROCEDURE — 99213 OFFICE O/P EST LOW 20 MIN: CPT | Mod: S$PBB,,, | Performed by: OPTOMETRIST

## 2024-02-28 PROCEDURE — 1160F RVW MEDS BY RX/DR IN RCRD: CPT | Mod: CPTII,,, | Performed by: OPTOMETRIST

## 2024-02-28 PROCEDURE — 99214 OFFICE O/P EST MOD 30 MIN: CPT | Mod: PBBFAC,25 | Performed by: OPTOMETRIST

## 2024-02-28 NOTE — PROGRESS NOTES
HPI     Blurred Vision            Comments: Pt reports for 1m rtc AR, dry MRX, and nigel. Denies any pain or   irritation. Va stable. Using Xiidra BID.           Comments    1. DM 1998  2. Dry eye    Restasis d/c per pt feeling that it does not work     Xiidra bid OU (started 12/20/23)             Last edited by Raz Gannon on 2/28/2024 10:11 AM.            Assessment /Plan     For exam results, see Encounter Report.    Dry eye  Doing well since last visit  Continue Xiidra bid OU     Irregular astigmatism of both eyes  -     Corneal Topography - OU - Both Eyes  Baseline Nigel today, repeat 6 months   Consider corneal referral with any progression/change  Monitor 6 months        Myopia with presbyopia, bilateral  Eyeglass Final Rx       Eyeglass Final Rx         Sphere Cylinder Axis Add    Right -2.75 +0.75 095 +2.25    Left -3.50   +2.25      Expiration Date: 2/28/2025              Eyeglass Final Rx #2         Sphere Cylinder Axis Add    Right -1.75 +0.75 095 +1.25    Left -2.50   +1.25      Type: [employer]    Expiration Date: 2/28/2025                  RTC 6 months for repeat nigel or PRN if any problems.   Discussed above and answered questions.

## 2024-02-28 NOTE — TELEPHONE ENCOUNTER
----- Message from Tonya Garsia sent at 2/28/2024  4:58 PM CST -----  Contact: Yolanda Marshall is calling to schedule appointment. Please callback [phone]

## 2024-03-01 ENCOUNTER — TELEPHONE (OUTPATIENT)
Dept: REHABILITATION | Facility: HOSPITAL | Age: 54
End: 2024-03-01

## 2024-03-01 ENCOUNTER — CLINICAL SUPPORT (OUTPATIENT)
Dept: REHABILITATION | Facility: HOSPITAL | Age: 54
End: 2024-03-01
Payer: MEDICAID

## 2024-03-01 DIAGNOSIS — Z74.09 DECREASED STRENGTH, ENDURANCE, AND MOBILITY: ICD-10-CM

## 2024-03-01 DIAGNOSIS — R53.1 DECREASED STRENGTH, ENDURANCE, AND MOBILITY: ICD-10-CM

## 2024-03-01 DIAGNOSIS — M25.671 DECREASED RANGE OF MOTION OF BOTH ANKLES: ICD-10-CM

## 2024-03-01 DIAGNOSIS — M72.2 PLANTAR FASCIITIS, BILATERAL: Primary | ICD-10-CM

## 2024-03-01 DIAGNOSIS — M79.672 BILATERAL FOOT PAIN: ICD-10-CM

## 2024-03-01 DIAGNOSIS — R68.89 DECREASED STRENGTH, ENDURANCE, AND MOBILITY: ICD-10-CM

## 2024-03-01 DIAGNOSIS — M25.672 DECREASED RANGE OF MOTION OF BOTH ANKLES: ICD-10-CM

## 2024-03-01 DIAGNOSIS — M79.671 BILATERAL FOOT PAIN: ICD-10-CM

## 2024-03-01 PROCEDURE — 97110 THERAPEUTIC EXERCISES: CPT | Mod: CQ

## 2024-03-01 NOTE — PROGRESS NOTES
[facility]   Physical Therapy Treatment Note        Name: Yolanda [facility]  Clinic Number: [id number]    Therapy Diagnosis:   Encounter Diagnoses   Name Primary?    Plantar fasciitis, bilateral Yes    Decreased strength, endurance, and mobility     Decreased range of motion of both ankles     Bilateral foot pain      Physician: Sasha Sorenson MD    Visit Date: 3/1/2024      Physician Orders: PT Eval and Treat  Medical Diagnosis from Referral: bilateral plantar fasciitis   Evaluation Date: 2/9/2024  Authorization Period Expiration: 1/29/2025  Plan of Care Expiration: 5/9/2024  Progress Note Due: 3/10/2024  Visit # / Visits authorized: 3/20 (+ evaluation)  FOTO: 1/3 (last performed on 2/9/2024)     Precautions: Standard and Diabetes     PTA Visit #: 2/5     Time In: 0700  Time Out: 0800  Total Billable Time: 55 minutes Billing reflects [place] medicaid guidelines, billing all therapy as therapeutic-exercise     Subjective     Patient reports: pain still present in both feet and also with complaints of pain across bilateral shoulders, down her spine. States she has missed appointments due to transportation but has since changed her  and therefore plans on attending therapy more consistently.     She was compliant with home exercise program.    Response to previous treatment: sore after evaluation    Functional change: decreased standing and walking tolerance and pain with raising up on tip toes     Pain: 9/10  across shoulder and down spine   Location: 7/10 right heel and arch of foot (right worse than left)    Objective      Objective Measures updated at progress report or POC update only unless otherwise noted.       Treatment       Yolanda received the treatments listed below:     MANUAL THERAPY TECHNIQUES were applied for (0) minutes, including:    Manual Intervention Performed Today    Soft Tissue Mobilization []  [] bilateral  gastrocnemius, soleus, posterior  tibialis, anterior tibialis , foot intrinsics bilateral kinesiotape application bilateral arches    Joint Mobilizations [] Anterior and posterior mobs     []     []    Functional Dry Needling  []      Plan for Next Visit: Continue as needed         THERAPEUTIC EXERCISES to develop strength, endurance, ROM, flexibility, posture, and core stabilization for (10) minutes including:    Intervention Performed Today    Nustep  x 5 minutes    Bike  Attempted but patient requested to stop after 2 minutes   Gastrocnemius stretch  x 3 x 30 standing on wedge bilateral    Soleus stretch x 3 x 30 standing on wedge bilateral                               Plan for Next Visit:        NEUROMUSCULAR RE-EDUCATION ACTIVITIES to improve Balance, Coordination, Kinesthetic, Sense, Proprioception, and Posture for (45) minutes.  The following were included:    Intervention Performed Today    Toe yoga x 3 minutes bilateral alternating    Toe abduction/adduction x 3', bilateral LE   Toe flexion/extension x 3 minutes bilateral    Ankle circles  X  x X 30 counter clockwise   X 30 clockwise    BAPS Board   2' each position (DF/PF, IV/EV, CW/CCW)   Arch press ups (added)  x 3 x 10 bilateral    Ankle active range of motion      X  X  X  x Yellow band 3 x 10 long sitting with lower extremity elevated bilateral   Dorsiflexion   Plantarflexion  Inversion  Eversion                     Plan for Next Visit:        Patient Education and Home Exercises       Home Exercises Provided and Patient Education Provided     Education provided: (during session) minutes  PURPOSE: Patient educated on the impairments noted above and the effects of physical therapy intervention to improve overall condition and QOL.   EXERCISE: Patient was educated on all the above exercise prior/during/after for proper posture, positioning, and execution for safe performance with home exercise program.   STRENGTH: Patient educated on the importance of improved core and extremity  strength in order to improve alignment of the spine and extremities with static positions and dynamic movement.   3/1/2024: educated on the importance of maintaining core stability with all activties of daily living     Written Home Exercises Provided: yes.  Exercises were reviewed and Yolanda was able to demonstrate them prior to the end of the session.  Yolanda demonstrated good  understanding of the education provided. See EMR under Patient Instructions for exercises provided during therapy sessions.    Assessment     Patient demonstrates difficulty sitting with good posture while performing ankle/tow exercises. Patient was able to tolerate nustep x 5 minutes without rest at an appropriate pace. She was able to be progressed with the addition of ankle resistive exercises siting with good movement quality. She reports decreased ankle pain (5/10) after this session but also reports unchanged level of back pain. She demonstrated good toe yoga quality without instructions.      Yolanda is progressing well towards her goals.   Patient prognosis is Good.     Patient will continue to benefit from skilled outpatient physical therapy to address the deficits listed in the problem list box on initial evaluation, provide pt/family education and to maximize patient's level of independence in the home and community environment.     Patient's spiritual, cultural and educational needs considered and pt agreeable to plan of care and goals.       Anticipated Barriers for therapy: co-morbidities           Short Term Goals:  6 weeks Status  Date Met   PAIN: Pt will report worst pain of 5/10 in order to progress toward max functional ability and improve quality of life. [x] Progressing  [] Met  [] Not Met     FUNCTION: Patient will demonstrate improved function as indicated by a score of greater than or equal to 22 out of 100 on FOTO. [x] Progressing  [] Met  [] Not Met     STRENGTH: Patient will improve strength to 50% of stated goals,  listed in objective measures above, in order to progress towards independence with functional activities. [x] Progressing  [] Met  [] Not Met     POSTURE: Patient will correct postural deviations in sitting and standing, to decrease pain and promote long term stability.  [x] Progressing  [] Met  [] Not Met     GAIT: Patient will demonstrate improved gait mechanics in order to improve functional mobility, improve quality of life, and decrease risk of further injury or fall.  [x] Progressing  [] Met  [] Not Met     HEP: Patient will demonstrate independence with HEP in order to progress toward functional independence. [x] Progressing  [] Met  [] Not Met        Long Term Goals:  12 weeks Status Date Met   PAIN: Pt will report worst pain of 3/10 in order to progress toward max functional ability and improve quality of life [x] Progressing  [] Met  [] Not Met     FUNCTION: Patient will demonstrate improved function as indicated by a score of greater than or equal to 34 out of 100 on FOTO. [x] Progressing  [] Met  [] Not Met     MOBILITY: Patient will improve AROM to stated goals, listed in objective measures above, in order to return to maximal functional potential and improve quality of life.  [x] Progressing  [] Met  [] Not Met     STRENGTH: Patient will improve strength to stated goals, listed in objective measures above, in order to improve functional independence and quality of life.  [x] Progressing  [] Met  [] Not Met     GAIT: Patient will demonstrate normalized gait mechanics with minimal compensation in order to return to PLOF. [x] Progressing  [] Met  [] Not Met     Patient will return to normal ADL's, IADL's, community involvement, recreational activities, and work-related activities with less than or equal to 1/10 pain and maximal function.  [x] Progressing  [] Met  [] Not Met          Plan     Continue Plan of Care (POC) and progress per patient tolerance. See treatment section for details on planned  progressions next session.      Raz Orozco, PTA

## 2024-03-01 NOTE — TELEPHONE ENCOUNTER
Spoke with patient to discuss multiple referrals. Recommended that we treat one area at a time to fully address each issue, and she expressed understanding. Discussed how OT will incorporate postural strengthening when addressing carpal tunnel at well, which may help with the neck, and if not then PT can address once feet and back are feeling better. Patient expressed understanding and was agreeable with this plan.  Winifred Garrett, PT, DPT

## 2024-03-04 ENCOUNTER — TELEPHONE (OUTPATIENT)
Dept: OBSTETRICS AND GYNECOLOGY | Facility: CLINIC | Age: 54
End: 2024-03-04
Payer: MEDICAID

## 2024-03-04 ENCOUNTER — TELEPHONE (OUTPATIENT)
Dept: PAIN MEDICINE | Facility: CLINIC | Age: 54
End: 2024-03-04
Payer: MEDICAID

## 2024-03-04 ENCOUNTER — TELEPHONE (OUTPATIENT)
Dept: DIABETES | Facility: CLINIC | Age: 54
End: 2024-03-04
Payer: MEDICAID

## 2024-03-04 DIAGNOSIS — E26.9 HYPERALDOSTERONISM: ICD-10-CM

## 2024-03-04 DIAGNOSIS — E87.5 HYPERKALEMIA: ICD-10-CM

## 2024-03-04 DIAGNOSIS — I10 ESSENTIAL HYPERTENSION: Chronic | ICD-10-CM

## 2024-03-04 NOTE — TELEPHONE ENCOUNTER
Patient messaged reguarding refill on Tramadol. [facility] stated she did not fill prescription. Called pharmacy to verify and it was verified that it was not filled. Patient had a prescription on file from Dr. Roth on file from December. Called pt to inform that prescription can be filled.    Renée Oconnor MA

## 2024-03-04 NOTE — TELEPHONE ENCOUNTER
----- Message from Clarisa Phillips sent at 3/4/2024  9:14 AM CST -----  Regarding: pt called  Name of Who is Calling: PILO YANCEY [[id number]]      What is the request in detail: pt is requesting an appt for her wellness exam. Please advise       Can the clinic reply by [facility]: No       What Number [facility] Back [facility]: Telephone Information:  Mobile          [phone]

## 2024-03-04 NOTE — TELEPHONE ENCOUNTER
----- Message from Vira Moeller sent at 3/4/2024  3:57 PM CST -----  Contact: Yolanda  Quin[place] requests a call back. She is wearing an insulin pump and her blood sugars are dropping too low. Please call her back at [phone].    Thanks  TS

## 2024-03-04 NOTE — TELEPHONE ENCOUNTER
----- Message from Clarisa Phillips sent at 3/4/2024  9:11 AM CST -----  Regarding: pt called  Name of Who is Calling: PILO YANCEY [[id number]]      What is the request in detail: requesting a call back pt states she is in severe pain. Requesting tramadol to be called in. Please advise       [facility] [address] [address]  Phone: [phone] Fax: [phone]            Can the clinic reply by [facility]: No       What Number to Call Back if not in [facility]: Telephone Information:          [phone]

## 2024-03-04 NOTE — TELEPHONE ENCOUNTER
Care Due:                  Date            Visit Type   Department     Provider  --------------------------------------------------------------------------------                                ESTABLISHED   [facility] PRIMARY  Last Visit: 02-      PATIENT      CARE           Sasha Sorenson  Next Visit: None Scheduled  None         None Found                                                            Last  Test          Frequency    Reason                     Performed    Due Date  --------------------------------------------------------------------------------    Mg Level....  12 months..  magnesium................  Not Found    Overdue    Vitamin D...  12 months..  ergocalciferol...........  Not Found    Overdue    [employer] Embedded Care Due Messages. Reference number: [id number].   3/04/2024 1:20:05 PM CST

## 2024-03-04 NOTE — TELEPHONE ENCOUNTER
Spoke with patient and she states that her sugars are dropping throughout the day 50-60. Patient wants to know if she stop taking the metformin     Dexcom report put in media

## 2024-03-05 ENCOUNTER — CLINICAL SUPPORT (OUTPATIENT)
Dept: REHABILITATION | Facility: HOSPITAL | Age: 54
End: 2024-03-05
Attending: ORTHOPAEDIC SURGERY
Payer: MEDICAID

## 2024-03-05 ENCOUNTER — CLINICAL SUPPORT (OUTPATIENT)
Dept: REHABILITATION | Facility: HOSPITAL | Age: 54
End: 2024-03-05
Payer: MEDICAID

## 2024-03-05 ENCOUNTER — TELEPHONE (OUTPATIENT)
Dept: OPHTHALMOLOGY | Facility: CLINIC | Age: 54
End: 2024-03-05
Payer: MEDICAID

## 2024-03-05 DIAGNOSIS — K21.9 GASTROESOPHAGEAL REFLUX DISEASE, UNSPECIFIED WHETHER ESOPHAGITIS PRESENT: ICD-10-CM

## 2024-03-05 DIAGNOSIS — Z74.09 DECREASED STRENGTH, ENDURANCE, AND MOBILITY: ICD-10-CM

## 2024-03-05 DIAGNOSIS — G56.03 BILATERAL CARPAL TUNNEL SYNDROME: ICD-10-CM

## 2024-03-05 DIAGNOSIS — R68.89 DECREASED STRENGTH, ENDURANCE, AND MOBILITY: ICD-10-CM

## 2024-03-05 DIAGNOSIS — Z98.890 S/P BILATERAL CARPAL TUNNEL RELEASE: ICD-10-CM

## 2024-03-05 DIAGNOSIS — M79.671 BILATERAL FOOT PAIN: ICD-10-CM

## 2024-03-05 DIAGNOSIS — M25.671 DECREASED RANGE OF MOTION OF BOTH ANKLES: ICD-10-CM

## 2024-03-05 DIAGNOSIS — M72.2 PLANTAR FASCIITIS, BILATERAL: Primary | ICD-10-CM

## 2024-03-05 DIAGNOSIS — Z78.9 DECREASED ACTIVITIES OF DAILY LIVING (ADL): ICD-10-CM

## 2024-03-05 DIAGNOSIS — M25.672 DECREASED RANGE OF MOTION OF BOTH ANKLES: ICD-10-CM

## 2024-03-05 DIAGNOSIS — R29.898 DECREASED GRIP STRENGTH: Primary | ICD-10-CM

## 2024-03-05 DIAGNOSIS — R52 PAIN: ICD-10-CM

## 2024-03-05 DIAGNOSIS — M79.672 BILATERAL FOOT PAIN: ICD-10-CM

## 2024-03-05 DIAGNOSIS — R53.1 DECREASED STRENGTH, ENDURANCE, AND MOBILITY: ICD-10-CM

## 2024-03-05 PROCEDURE — 97166 OT EVAL MOD COMPLEX 45 MIN: CPT

## 2024-03-05 PROCEDURE — 97530 THERAPEUTIC ACTIVITIES: CPT

## 2024-03-05 PROCEDURE — 97110 THERAPEUTIC EXERCISES: CPT | Mod: CQ

## 2024-03-05 RX ORDER — SPIRONOLACTONE 25 MG/1
25 TABLET ORAL DAILY
Qty: 90 TABLET | Refills: 3 | Status: SHIPPED | OUTPATIENT
Start: 2024-03-05 | End: 2025-03-05

## 2024-03-05 RX ORDER — FUROSEMIDE 20 MG/1
20 TABLET ORAL DAILY
Qty: 90 TABLET | Refills: 3 | Status: SHIPPED | OUTPATIENT
Start: 2024-03-05 | End: 2025-03-05

## 2024-03-05 RX ORDER — AMLODIPINE AND VALSARTAN 10; 320 MG/1; MG/1
1 TABLET ORAL DAILY
Qty: 90 TABLET | Refills: 3 | Status: SHIPPED | OUTPATIENT
Start: 2024-03-05

## 2024-03-05 RX ORDER — OMEPRAZOLE 40 MG/1
40 CAPSULE, DELAYED RELEASE ORAL DAILY
Qty: 90 CAPSULE | Refills: 3 | Status: SHIPPED | OUTPATIENT
Start: 2024-03-05

## 2024-03-05 NOTE — TELEPHONE ENCOUNTER
No care due was identified.  [facility] Embedded Care Due Messages. Reference number: [id number].   3/05/2024 9:08:30 AM CST

## 2024-03-05 NOTE — TELEPHONE ENCOUNTER
----- Message from Lydia Pfeiffer sent at 3/5/2024  9:52 AM CST -----  Pt is asking for an return call in reference to having blurred vision  pt states its urgent to return call ASAP very blurry vision states the room looks like it has smoke want to speak with nurse was told to call if any issues   ,please call back at .[phone]

## 2024-03-05 NOTE — TELEPHONE ENCOUNTER
Refill Decision Note   Yolanda Alpesh  is requesting a refill authorization.  Brief Assessment and Rationale for Refill:  Approve     Medication Therapy Plan:        Comments:     Note composed:11:02 AM 03/05/2024

## 2024-03-05 NOTE — PLAN OF CARE
[facility]   Occupational Therapy Initial Evaluation     Date: 3/5/2024  Name: Yolanda Betts  Clinic Number: [id number]    Therapy Diagnosis:   Encounter Diagnoses   Name Primary?    Bilateral carpal tunnel syndrome     S/p bilateral carpal tunnel release     Decreased  strength Yes    Decreased activities of daily living (ADL)     Pain      Physician: Neville Roth,*    Physician Orders: Occupational Therapy to Evaluate and Treat  Medical Diagnosis:   G56.03 (ICD-10-CM) - Bilateral carpal tunnel syndrome   Z98.890 (ICD-10-CM) - S/p bilateral carpal tunnel release     Surgical Procedure and Date: Bilateral carpal tunnel release, 12/18/2023    Evaluation Date: 3/5/2024  Insurance Authorization Period Expiration: 2/27/2023 - 12/31/2024  Plan of Care Certification Period: 3/5/2024 - 6/5/2024  Progress Note Due: 4/5/2024     Date of Return to MD: N/A  Visit # / Visits authorized: 1/1  FOTO: 1/3    Precautions:  Standard    Time In: 8:15  Time Out: 9:00  Total Appointment Time (timed & untimed codes): 45 minutes    Subjective     Date of Onset: 12/18/2023  Mechanism of Injury/ History of Current Condition: Patient reports symptoms for 10 years and decided to have surgery. She had a bilateral carpal tunnel release on 12/18/2023. She has had no formal therapy. She reports pain and significant weakness in bilateral hands    Involved Side: bilateral  Dominant Side: Right    Imaging: FINDINGS:  No acute abnormality with similar mild OA findings    Previous Therapy: None since surgery    Patient's Goals for Therapy: Patient reported goals are to decrease overall pain levels in order to return to prior functional level.    Falls: None    Pain:  Functional Pain Scale Rating 0-10:   5/10 on average  3/10 at best  6/10 at worst  Location: bilateral hands  Description: Aching  Aggravating Factors: Gripping, Lifting, Reaching  Easing Factors: rest    Occupation: Full time minstery  Working  Presently:   Duties:    Functional Limitations/Social History:    Prior Level of Function: Independent and pain free with all ADL, IADL, community mobility and functional activities.   Current Level of Function: Min A to Independent with all ADL, IADL, community mobility and functional activities with reports of increased pain and need for increased time and frequent breaks.    Limitation of Functional Status as follows:   ADLs/IADLs:     - Feeding: Mod I with pain    - Bathing: Mod I with pain unable to thoroughly wash    - Dressing Mod I with pain  - Grooming: Mod I with pain    - Driving: Transpotation  - Leisure: Reading    Home/Living Environment : lives alone  Home Access: Single Story  DME: none      Past Medical History/Physical Systems Review:   Medical History:   Past Medical History:   Diagnosis Date    Abnormal Pap smear of cervix     HPV genital warts    Anemia     Anxiety     Arthritis     Asthma 10/11/2016    Bipolar 1 disorder     Diabetes mellitus, type 2     Dyslipidemia associated with type 2 diabetes mellitus 05/13/2019    General anesthetics causing adverse effect in therapeutic use     Genital warts     GERD (gastroesophageal reflux disease)     Herpes simplex virus (HSV) infection     Hyperlipidemia     Hypertension     Hypertension complicating diabetes 05/05/2019    Migraine with aura and without status migrainosus, not intractable 03/21/2016    Mild persistent asthma without complication 10/11/2016    Morbid obesity with body mass index (BMI) of 45.0 to 49.9 in adult 08/03/2017    Obstructive sleep apnea     ALEN (obstructive sleep apnea)     2L per N/C q HS    Schizoaffective disorder, bipolar type 04/25/2019    Seasonal allergic rhinitis due to pollen 05/13/2019    Type 2 diabetes mellitus with hyperglycemia, with long-term current use of insulin 12/21/2017    Type 2 diabetes mellitus with hyperglycemia, without long-term current use of insulin 12/21/2017       Surgical History:    has a  past surgical history that includes  section; Mouth surgery (); Breast surgery (Bilateral, ); Tubal ligation; Colonoscopy; Belt abdominoplasty (); Colonoscopy (N/A, 2018); Colonoscopy (N/A, 2018); Oophorectomy; Hysterectomy; abcess removed right back; Robot-assisted cholecystectomy using da Sharonda Xi (N/A, 1/3/2020); Total Reduction Mammoplasty; Epidural steroid injection into cervical spine (N/A, 2023); Esophagogastroduodenoscopy (N/A, 3/10/2023); Colonoscopy (N/A, 3/10/2023); Carpal tunnel release (Bilateral, 2023); Injection of anesthetic agent around medial branch nerves innervating cervical facet joint (Bilateral, 2023); and Epidural steroid injection into cervical spine (N/A, 2024).    Medications:   has a current medication list which includes the following prescription(s): alprazolam, amlodipine-valsartan, aspirin, atorvastatin, bd insulin syringe ultra-fine, dexcom g6 sensor, dexcom g7 sensor, dexcom g6 transmitter, vraylar, [START ON 3/6/2024] vraylar, duloxetine, ergocalciferol, estradiol, fenofibrate, fluticasone propionate, frovatriptan, furosemide, baqsimi, insulin aspart u-100, insulin aspart u-100, toujeo solostar u-300 insulin, omnipod 5 g6 pods (gen 5), insulin syringe-needle u-100, lamotrigine, levocetirizine, lifitegrast, lurasidone, magnesium oxide, metformin, metoprolol succinate, mirtazapine, omeprazole, pen needle, diabetic, pen needle, diabetic, cosentyx pen (2 pens), spironolactone, tizanidine, tramadol, [DISCONTINUED] clonazepam, [DISCONTINUED] glucagon emergency kit (human), [DISCONTINUED] valsartan, and [DISCONTINUED] zolpidem, and the following Facility-Administered Medications: ondansetron.    Allergies:   Review of patient's allergies indicates:   Allergen Reactions    Codeine Itching    Hydromorphone Other (See Comments)     Can't wake up for long time if taken  Slow to wake up after surgery after receiving    Aleve [naproxen sodium]       Increases BP    Lyrica [pregabalin] Itching    Motrin [ibuprofen]      Increases BP    Neuromuscular blockers, steroidal Hives     some    Latex, natural rubber Rash    Morphine Rash     itching    Norco [hydrocodone-acetaminophen] Itching, Rash and Hallucinations    Seconal [secobarbital sodium] Rash     itching    Tylox [oxycodone-acetaminophen] Rash          Objective     Observation/Inspection:  Patient demonstrates pain and tenderness of bilateral carpal tunnel incisions    Sensation: Pt denies hypersensitivity.Paresthesias reported.   Scar: Moderate, tenderness to palpation. Scar is moderately thickened and raised, mod adherence. Scar is dense and immobile.       Active Range of Motion:    Joint Evaluation  AROM  3/5/2024 AROM  3/5/2024 Pain/Dysfunction with movement  3/5/2024 Goal  Bilateral    Left Right     Elbow Flexion 0-150 Within normal limits  Within normal limits      Elbow Extension 0       Wrist Flexion 0-80 60 45 5/10 70   Wrist Extension 0-70 50 50 5/10 60   Wrist Supination 0-80 70 70 5/10 Within normal limits    Wrist Pronation 0-80 Within normal limits  Within normal limits  5/10    Wrist Ulnar Deviation 0-30 15 15 5/10 20   Wrist Radial Deviation 0-20 10 10 5/10 15                                Manual Muscles Test:  Strength Left Right Goal    3/5/2024 3/5/2024 Bilateral    Elbow Flexion 4/5 4/5    Elbow Extension      Pronation 3+/5 3+/5 4+/5   Supination      Radial Deviation      Ulnar Deviation      Wrist Flexion      Wrist Extension                                               and Pinch Strength (in pounds, psi's):   Left Right Goal    3/5/2024 3/5/2024 Bilateral    II 38 25 40   Lateral 13 9 15   Tripod 11 9 13   Tip 5 5 7            Intake Outcome Measure for FOTO wrist Survey    Therapist reviewed FOTO scores for Yolanda Betts on 3/5/2024.   FOTO documents entered into EPIC - see Media section.    Evaluation Intake Score: 28%         Treatment     Total Treatment  time (time-based codes) separate from Evaluation: 15 minutes      Yolanda received therapeutic exercises for 15 minutes including:  - Forearm stretching 3 ways  - Median nerve glides  - Intrinsics     Home Exercise Program/Education:    Education provided:   Patient educated on the impairments noted above and the effects of Occupational therapy intervention to improve overall condition and Quality of Life.   Patient was educated on all the above exercise prior/during/after for proper posture, positioning, and execution for safe performance with home exercise program.  Patient/Family Education: role of Occupational Therapy, goals for Occupational Therapy, scheduling/cancellations, and insurance limitations - patient verbalized understanding  Home Exercise Program - See Below    Written Home Exercises Provided: yes.  Exercises were reviewed and Yolanda was able to demonstrate them prior to the end of the session. Patient was advised to perform these exercises free of pain, and to stop performing them if pain occurs.  Yolanda demonstrated good  understanding of the education provided.     See EMR under Patient Instructions for exercises provided 3/5/2024.    Assessment     Yolanda Betts is a 53 y.o. female referred to outpatient occupational therapy and presents with a medical diagnosis of Bilateral carpal tunnel syndrome, S/p bilateral carpal tunnel release .    Following medical record review it is determined that pt will benefit from occupational therapy services in order to maximize pain free and/or functional use of bilateral upper extremities. The following goals were discussed with the patient and patient is in agreement with them as to be addressed in the treatment plan. The patient's rehab potential is Good.     Anticipated barriers to occupational therapy: Pain, Scar Tissue formation    Plan of care discussed with patient: Yes  Patient's spiritual, cultural and educational needs considered and patient is  agreeable to the plan of care and goals as stated below:     Medical Necessity is demonstrated by the following  Occupational Profile/History  Co-morbidities and personal factors that may impact the plan of care [] LOW: Brief chart review  [x] MODERATE: Expanded chart review   [] HIGH: Extensive chart review    Moderate / High Support Documentation:   Past Medical History:   Diagnosis Date    Abnormal Pap smear of cervix     HPV genital warts    Anemia     Anxiety     Arthritis     Asthma 10/11/2016    Bipolar 1 disorder     Diabetes mellitus, type 2     Dyslipidemia associated with type 2 diabetes mellitus 05/13/2019    General anesthetics causing adverse effect in therapeutic use     Genital warts     GERD (gastroesophageal reflux disease)     Herpes simplex virus (HSV) infection     Hyperlipidemia     Hypertension     Hypertension complicating diabetes 05/05/2019    Migraine with aura and without status migrainosus, not intractable 03/21/2016    Mild persistent asthma without complication 10/11/2016    Morbid obesity with body mass index (BMI) of 45.0 to 49.9 in adult 08/03/2017    Obstructive sleep apnea     ALEN (obstructive sleep apnea)     2L per N/C q HS    Schizoaffective disorder, bipolar type 04/25/2019    Seasonal allergic rhinitis due to pollen 05/13/2019    Type 2 diabetes mellitus with hyperglycemia, with long-term current use of insulin 12/21/2017    Type 2 diabetes mellitus with hyperglycemia, without long-term current use of insulin 12/21/2017        Examination  Performance deficits relating to physical, cognitive or psychosocial skills that result in activity limitations and/or participation restrictions  [] LOW: addressing 1-3 Performance deficits  [x] MODERATE: 3-5 Performance deficits  [] HIGH: 5+ Performance deficits (please support below)    Moderate / High Support Documentation:    Physical:  Joint Mobility  Joint Stability  Muscle Power/Strength  Muscle Endurance  Skin Integrity/Scar  Formation   Strength  Pinch Strength  Fine Motor Coordination  Tactile Functions  Pain    Cognitive:  No Deficits    Psychosocial:    No Deficits     Treatment Options [] LOW: Limited options  [x] MODERATE: Several options  [] HIGH: Multiple options      Decision Making/ Complexity Score: moderate       The following goals were discussed with the patient and patient is in agreement with them as to be addressed in the treatment plan.     Goals:    Short Term Goals:  6 weeks  Progress      Pain: Patient will demonstrate improved pain by reports of less than or equal to 3/10 worst pain on the verbal rating scale in order to progress toward maximal functional ability and improve quality of life. PC   Function: Patient will demonstrate improved function as indicated by a functional intake score of more than or equal to 40 out of 100 on FOTO. PC   Mobility: Patient will improve AROM to 50% of stated goals, listed in objective measures above, in order to progress towards independence with functional activities.  PC   Strength: Patient will improve strength to 50% of stated goals, listed in objective measures above, in order to progress towards independence with functional activities.  PC   HEP: Patient will demonstrate independence with HEP in order to progress toward functional independence. PC     Long Term Goals:  12 weeks  Progress     Pain: Patient will demonstrate improved pain by reports of less than or equal to 2/10 worst pain on the verbal rating scale in order to progress toward maximal functional ability and improve quality of life.   PC   Function: Patient will demonstrate improved function as indicated by a functional intake score of more than or equal to 52 out of 100 on FOTO. PC   Mobility: Patient will improve AROM to stated goals, listed in objective measures above, in order to return to maximal functional potential and improve quality of life. PC   Strength: Patient will improve strength to stated  goals, listed in objective measures above, in order to improve functional independence and quality of life. PC   Patient will return to normal ADL's, IADL's, community involvement, recreational activities, and work-related activities with less than or equal to 1/10 pain and maximal function.  PC     Goals Key:  PC= Progressing/Continue; PM= Partially Met;  Goal Met= Goal Met      DC= Discontinue    Plan   Plan of Care Certification: 3/5/2024 to 6/5/2024.     Outpatient Occupational Therapy 1-2 times weekly for 12 weeks to include the following interventions:  Therapeutic Exercise, Functional Activities, Patient Education, Home Exercise Program, ADL Training, Transfer/Mobility Training, Manual Therapy, Neuromuscular Reeducation/Sensory, Electrical Stimulation/TENS/Interferential, Moist Heat/Ice/Paraffin, Cognitive Preceptual Retraining, Orthotic Fabrication/Fit/Management, Ultrasound/Phonophoresis, and Edema Control/Fluidotherapy.    Mary Rosenbaum, OT ,OTD, OTR/L, CLT      I CERTIFY THE NEED FOR THESE SERVICES FURNISHED UNDER THIS PLAN OF TREATMENT AND WHILE UNDER MY CARE  Physician's comments:      Physician's Signature: ___________________________________________________

## 2024-03-05 NOTE — TELEPHONE ENCOUNTER
Refill Decision Note   Yolanda Alpesh  is requesting a refill authorization.  Brief Assessment and Rationale for Refill:  Approve     Medication Therapy Plan:         Comments:     Note composed:3:01 AM 03/05/2024

## 2024-03-05 NOTE — TELEPHONE ENCOUNTER
[place] states having smoke in [place] unsure if its both eyes or just one. I asked her to cover over each eye 1 at a time to see which eye she is experiencing symptoms, she could not tell. Offered appt today, pt declined. Pt accepted Thursday appt only

## 2024-03-05 NOTE — PROGRESS NOTES
[facility]   Physical Therapy Treatment Note        Name: Yolanda Martin [facility]  Clinic Number: [id number]    Therapy Diagnosis:   Encounter Diagnoses   Name Primary?    Plantar fasciitis, bilateral Yes    Decreased strength, endurance, and mobility     Decreased range of motion of both ankles     Bilateral foot pain        Physician: Sasha Sorenson MD    Visit Date: 3/5/2024      Physician Orders: PT Eval and Treat  Medical Diagnosis from Referral: bilateral plantar fasciitis   Evaluation Date: 2/9/2024  Authorization Period Expiration: 1/29/2025  Plan of Care Expiration: 5/9/2024  Progress Note Due: 3/10/2024  Visit # / Visits authorized: 4/20 (+ evaluation)  FOTO: 1/3 (last performed on 2/9/2024)     Precautions: Standard and Diabetes     PTA Visit #: 3/5     Time In: 0703  Time Out: 0755  Total Billable Time: 50 minutes Billing reflects [place] medicaid guidelines, billing all therapy as therapeutic-exercise     Subjective     Patient reports: pain still present in both feet and also with complaints of pain across bilateral shoulders, down her spine. States she has missed appointments due to transportation but has since changed her  and therefore plans on attending therapy more consistently.     She was compliant with home exercise program.    Response to previous treatment: sore after evaluation    Functional change: decreased standing and walking tolerance and pain with raising up on tip toes     Pain: NA/10  across shoulder and down spine   Location: 6/10 bilateral heels     Objective      Objective Measures updated at progress report or POC update only unless otherwise noted.       Treatment       Yolanda received the treatments listed below:     MANUAL THERAPY TECHNIQUES were applied for (8) minutes, including:    Manual Intervention Performed Today    Soft Tissue Mobilization [x]  [] bilateral  gastrocnemius, soleus, posterior tibialis, anterior tibialis ,  foot intrinsics    Joint Mobilizations [] Anterior and posterior mobs     []     []    Functional Dry Needling  []      Plan for Next Visit: Continue as needed         THERAPEUTIC EXERCISES to develop strength, endurance, ROM, flexibility, posture, and core stabilization for (9) minutes including:    Intervention Performed Today    Nustep  x 5 minutes    Bike  Attempted but patient requested to stop after 2 minutes   Gastrocnemius stretch  x 3 x 30 standing on wedge bilateral    Soleus stretch x 3 x 30 standing on wedge bilateral                               Plan for Next Visit:        NEUROMUSCULAR RE-EDUCATION ACTIVITIES to improve Balance, Coordination, Kinesthetic, Sense, Proprioception, and Posture for (33) minutes.  The following were included:    Intervention Performed Today    Toe yoga  3 minutes bilateral alternating    Toe abduction/adduction x 3 minutes bilateral LE   Toe flexion/extension x 3 minutes bilateral    Ankle circles  X  x X 30 counter clockwise   X 30 clockwise    BAPS Board   2' each position (DF/PF, IV/EV, CW/CCW)   Arch press ups (added)  x 3 x 10 bilateral    Ankle active range of motion      X  X  X  x Yellow band 3 x 10 long sitting with lower extremity elevated bilateral   Dorsiflexion   Plantarflexion  Inversion  Eversion                     Plan for Next Visit:        Patient Education and Home Exercises       Home Exercises Provided and Patient Education Provided     Education provided: (during session) minutes  PURPOSE: Patient educated on the impairments noted above and the effects of physical therapy intervention to improve overall condition and QOL.   EXERCISE: Patient was educated on all the above exercise prior/during/after for proper posture, positioning, and execution for safe performance with home exercise program.   STRENGTH: Patient educated on the importance of improved core and extremity strength in order to improve alignment of the spine and extremities with static  positions and dynamic movement.   3/1/2024: educated on the importance of maintaining core stability with all activties of daily living   3/5/2024: added home exercise program to my chart and gave patient paper copy and gave patient yellow band    Written Home Exercises Provided: yes.  Exercises were reviewed and Yolanda was able to demonstrate them prior to the end of the session.  Yolanda demonstrated good  understanding of the education provided. See EMR under Patient Instructions for exercises provided during therapy sessions.    Assessment     Patient demonstrates good toe yoga quality and improved arch press up quality indicating she has been exercising regularly at home. She may be able to be progressed with standing exercises in the near future.     Yolanda is progressing well towards her goals.   Patient prognosis is Good.     Patient will continue to benefit from skilled outpatient physical therapy to address the deficits listed in the problem list box on initial evaluation, provide pt/family education and to maximize patient's level of independence in the home and community environment.     Patient's spiritual, cultural and educational needs considered and pt agreeable to plan of care and goals.       Anticipated Barriers for therapy: co-morbidities           Short Term Goals:  6 weeks Status  Date Met   PAIN: Pt will report worst pain of 5/10 in order to progress toward max functional ability and improve quality of life. [x] Progressing  [] Met  [] Not Met     FUNCTION: Patient will demonstrate improved function as indicated by a score of greater than or equal to 22 out of 100 on FOTO. [x] Progressing  [] Met  [] Not Met     STRENGTH: Patient will improve strength to 50% of stated goals, listed in objective measures above, in order to progress towards independence with functional activities. [x] Progressing  [] Met  [] Not Met     POSTURE: Patient will correct postural deviations in sitting and standing, to  decrease pain and promote long term stability.  [x] Progressing  [] Met  [] Not Met     GAIT: Patient will demonstrate improved gait mechanics in order to improve functional mobility, improve quality of life, and decrease risk of further injury or fall.  [x] Progressing  [] Met  [] Not Met     HEP: Patient will demonstrate independence with HEP in order to progress toward functional independence. [x] Progressing  [] Met  [] Not Met        Long Term Goals:  12 weeks Status Date Met   PAIN: Pt will report worst pain of 3/10 in order to progress toward max functional ability and improve quality of life [x] Progressing  [] Met  [] Not Met     FUNCTION: Patient will demonstrate improved function as indicated by a score of greater than or equal to 34 out of 100 on FOTO. [x] Progressing  [] Met  [] Not Met     MOBILITY: Patient will improve AROM to stated goals, listed in objective measures above, in order to return to maximal functional potential and improve quality of life.  [x] Progressing  [] Met  [] Not Met     STRENGTH: Patient will improve strength to stated goals, listed in objective measures above, in order to improve functional independence and quality of life.  [x] Progressing  [] Met  [] Not Met     GAIT: Patient will demonstrate normalized gait mechanics with minimal compensation in order to return to PLOF. [x] Progressing  [] Met  [] Not Met     Patient will return to normal ADL's, IADL's, community involvement, recreational activities, and work-related activities with less than or equal to 1/10 pain and maximal function.  [x] Progressing  [] Met  [] Not Met          Plan     Continue Plan of Care (POC) and progress per patient tolerance. See treatment section for details on planned progressions next session.      Raz Orozco, PTA

## 2024-03-07 ENCOUNTER — OFFICE VISIT (OUTPATIENT)
Dept: DIABETES | Facility: CLINIC | Age: 54
End: 2024-03-07
Payer: MEDICAID

## 2024-03-07 DIAGNOSIS — E11.69 TYPE 2 DIABETES MELLITUS WITH OTHER SPECIFIED COMPLICATION, UNSPECIFIED WHETHER LONG TERM INSULIN USE: Primary | ICD-10-CM

## 2024-03-07 PROCEDURE — 1159F MED LIST DOCD IN RCRD: CPT | Mod: CPTII,95,, | Performed by: NURSE PRACTITIONER

## 2024-03-07 PROCEDURE — 99442 PR PHYSICIAN TELEPHONE EVALUATION 11-20 MIN: CPT | Mod: 95,,, | Performed by: NURSE PRACTITIONER

## 2024-03-07 PROCEDURE — 4010F ACE/ARB THERAPY RXD/TAKEN: CPT | Mod: CPTII,95,, | Performed by: NURSE PRACTITIONER

## 2024-03-07 PROCEDURE — 3072F LOW RISK FOR RETINOPATHY: CPT | Mod: CPTII,95,, | Performed by: NURSE PRACTITIONER

## 2024-03-07 PROCEDURE — 1160F RVW MEDS BY RX/DR IN RCRD: CPT | Mod: CPTII,95,, | Performed by: NURSE PRACTITIONER

## 2024-03-07 NOTE — PROGRESS NOTES
Established Patient - Audio Only Telehealth Visit     The patient location is: home, [place]  The chief complaint leading to consultation is: DM follow up  Visit type: Virtual visit with audio only (telephone)  Total time spent with patient: 11-20 min       The reason for the audio only service rather than synchronous audio and video virtual visit was related to technical difficulties or patient preference/necessity.     Each patient to whom I provide medical services by telemedicine is:  (1) informed of the relationship between the physician and patient and the respective role of any other health care provider with respect to management of the patient; and (2) notified that they may decline to receive medical services by telemedicine and may withdraw from such care at any time. Patient verbally consented to receive this service via voice-only telephone call.       HPI:     Yoalnda Betts is a 53 y.o. Black or  female presenting for a follow up for diabetes. Patient has been diagnosed with diabetes for > 10 years. Now on OP5 (unable to get report for pump or dexcom), pt reports lows. Metformin discontinued on Monday due to lows     Complications related to diabetes:  HTN, Hyperlipidemia, peripheral neuropathy; denies Pancreatitis; denies Gastroparesis; denies DKA; denies Hx/family Hx of MEN2/MTC; reports Frequent UTIs/yeast infections; Bariatric surgery 6/1/21.      Past failed treatment include:  Jardiance- multiple yeast infections; Victoza - GI upset     Blood glucose testing is performed regularly with her Dexcom     Compliance:The patient reports medication and diet compliance most of the time.      Assessment and plan:      Type 2 diabetes mellitus        Diabetes Medications               glucagon (BAQSIMI) 3 mg/actuation Spry 1 spray by Nasal route as needed (hypoglycemia). For emergency use. May repeat 1 time after 15 minutes    insulin aspart U-100 (NOVOLOG FLEXPEN U-100 INSULIN) 100  unit/mL (3 mL) InPn pen Via insulin pump    insulin glargine, TOUJEO, (TOUJEO SOLOSTAR U-300 INSULIN) 300 unit/mL (1.5 mL) InPn pen If pump failure                          PLAN:   - Condition: uncontrolled    - Monitor blood glucose 4x daily. Goals reviewed. She understands the hypoglycemia protocol   - Diet- recommended small meals to avoid possible reactive hypoglycemia   - BP and LDL- Recommended lifestyle modifications for management. Encouraged healthy low fat, low carb diet and increase physical activity  - Medication/pump Changes: Correct above changed from 130 to 140  - The patient was explained the above plan and given opportunity to ask questions.  She understands, chooses and consents to this plan and accepts all the risks, which include but are not limited to the risks mentioned above.   - Labs ordered as above  - Nurse visit:  deferred    - Follow up: 3-4 weeks- she will see RD Monday to help with devices     Pump failure plan:    Toujeo 10 units daily     Novolog ss  If blood pre-meal sugar is 151-200 add +2 units of Novolog;  If blood pre-meal sugar is 201-250 add +4 units of Novolog;  If blood pre-meal sugar is 251-300 add +6 units of Novolog;  If blood pre-meal sugar is 301-350 add  +8 units of Novolog;  If blood pre-meal sugar is 351-400+ add  +10 units of Novolog;        Correction scale (for steroid use/outside of eating a meal):  Novolog every 3 hours using correction scale outside of mealtime.  -200: 2 units  -250: 4 units  -300: 6 units  -350: 8 units  BG greater than 350: 10 units    This service was not originating from a related E/M service provided within the previous 7 days nor will  to an E/M service or procedure within the next 24 hours or my soonest available appointment.  Prevailing standard of care was able to be met in this audio-only visit.

## 2024-03-08 ENCOUNTER — CLINICAL SUPPORT (OUTPATIENT)
Dept: REHABILITATION | Facility: HOSPITAL | Age: 54
End: 2024-03-08
Payer: MEDICAID

## 2024-03-08 ENCOUNTER — OFFICE VISIT (OUTPATIENT)
Dept: OPHTHALMOLOGY | Facility: CLINIC | Age: 54
End: 2024-03-08
Payer: MEDICAID

## 2024-03-08 DIAGNOSIS — H52.213 IRREGULAR ASTIGMATISM OF BOTH EYES: ICD-10-CM

## 2024-03-08 DIAGNOSIS — H52.13 MYOPIA WITH PRESBYOPIA, BILATERAL: ICD-10-CM

## 2024-03-08 DIAGNOSIS — M25.672 DECREASED RANGE OF MOTION OF BOTH ANKLES: ICD-10-CM

## 2024-03-08 DIAGNOSIS — M25.671 DECREASED RANGE OF MOTION OF BOTH ANKLES: ICD-10-CM

## 2024-03-08 DIAGNOSIS — R68.89 DECREASED STRENGTH, ENDURANCE, AND MOBILITY: ICD-10-CM

## 2024-03-08 DIAGNOSIS — H52.4 MYOPIA WITH PRESBYOPIA, BILATERAL: ICD-10-CM

## 2024-03-08 DIAGNOSIS — M79.671 BILATERAL FOOT PAIN: ICD-10-CM

## 2024-03-08 DIAGNOSIS — H04.129 DRY EYE: Primary | ICD-10-CM

## 2024-03-08 DIAGNOSIS — M72.2 PLANTAR FASCIITIS, BILATERAL: Primary | ICD-10-CM

## 2024-03-08 DIAGNOSIS — R53.1 DECREASED STRENGTH, ENDURANCE, AND MOBILITY: ICD-10-CM

## 2024-03-08 DIAGNOSIS — Z74.09 DECREASED STRENGTH, ENDURANCE, AND MOBILITY: ICD-10-CM

## 2024-03-08 DIAGNOSIS — M79.672 BILATERAL FOOT PAIN: ICD-10-CM

## 2024-03-08 PROCEDURE — 1159F MED LIST DOCD IN RCRD: CPT | Mod: CPTII,,, | Performed by: OPTOMETRIST

## 2024-03-08 PROCEDURE — 99214 OFFICE O/P EST MOD 30 MIN: CPT | Mod: PBBFAC | Performed by: OPTOMETRIST

## 2024-03-08 PROCEDURE — 4010F ACE/ARB THERAPY RXD/TAKEN: CPT | Mod: CPTII,,, | Performed by: OPTOMETRIST

## 2024-03-08 PROCEDURE — 92014 COMPRE OPH EXAM EST PT 1/>: CPT | Mod: S$PBB,,, | Performed by: OPTOMETRIST

## 2024-03-08 PROCEDURE — 97164 PT RE-EVAL EST PLAN CARE: CPT | Performed by: PHYSICAL THERAPIST

## 2024-03-08 PROCEDURE — 2023F DILAT RTA XM W/O RTNOPTHY: CPT | Mod: CPTII,,, | Performed by: OPTOMETRIST

## 2024-03-08 PROCEDURE — 1160F RVW MEDS BY RX/DR IN RCRD: CPT | Mod: CPTII,,, | Performed by: OPTOMETRIST

## 2024-03-08 PROCEDURE — 99999 PR PBB SHADOW E&M-EST. PATIENT-LVL IV: CPT | Mod: PBBFAC,,, | Performed by: OPTOMETRIST

## 2024-03-08 PROCEDURE — 97110 THERAPEUTIC EXERCISES: CPT | Performed by: PHYSICAL THERAPIST

## 2024-03-08 NOTE — PROGRESS NOTES
[facility]   Physical Therapy Treatment Note + Re-Evaluation       Name: Yolanda Betts  Clinic Number: [id number]    Therapy Diagnosis:   Encounter Diagnoses   Name Primary?    Plantar fasciitis, bilateral Yes    Decreased strength, endurance, and mobility     Decreased range of motion of both ankles     Bilateral foot pain        Physician: Sasha Sorenson MD, Kathy Galan NP    Visit Date: 3/8/2024    Physician Orders: PT Eval and Treat  Medical Diagnosis from Referral: bilateral plantar fasciitis   Evaluation Date: 2/9/2024  Authorization Period Expiration: 1/29/2025  Plan of Care Expiration: 5/9/2024  Progress Note Due: 4/7/2024  Visit # / Visits authorized: 5/20 (+ evaluation)  FOTO: 1/3 (last performed on 2/9/2024)      Precautions: Standard and Diabetes     PTA Visit #: 3/5     Time In: 0703  Time Out: 0801  Total Billable Time: 54 minutes Billing reflects [place] medicaid guidelines, billing all therapy as therapeutic-exercise     Subjective     Patient reports: that she is hurting so bad yesterday and today. She could barely get into her bed yesterday, and this morning is the same thing. Her pain is all the way down her spine, across the shoulders, and into her low back. Patient reports that she has been dealing with low back pain for years now, but it has come to the point where she feels a constant pain. Sometimes sharp, sometimes dull, but it is always there. Patient reports that last year she was diagnosed with ankylosing spondylitis. They started her on injections that she takes every 28 days. Patient states that they give her a muscle relaxer, but she has to beg for pain medication. Patient has tried steroid injections and ROCAEL's, but they have not provided any relief. Patient reports that she would like for treatment to focus more on the low back area at this time.     She was compliant with home exercise program.  Response to previous treatment: sore after  evaluation  Functional change: decreased standing and walking tolerance and pain with raising up on tip toes     Pain: 8/10  across shoulder and down spine   Location: 6/10 bilateral heels     Objective      Objective Measures updated at progress report or POC update only unless otherwise noted.     RANGE OF MOTION:   **numbers in () are from initial evaluation  Ankle/Foot AROM/PROM Right Left Pain/Dysfunction with Movement Goal   Dorsiflexion (20º) 8  (-10) 8  (-8)  10   Plantarflexion (50º) 44  (41) 50  (44)  50   Inversion (35º) 32  (24) 24  35   Eversion (15º) 15  (15) 15  (15) Pain in L foot 15      RANGE OF MOTION:   Lumbar ROM Right  (spine) Left   Pain/Dysfunction with Movement Goal   Lumbar Flexion (60º) 20 --- Pain throughout entire back and into both legs with each motion tested today 40   Lumbar Extension (30º) 4 ---  10   Lumbar Side Bending (25º) 10 15  25         STRENGTH:   L/E MMT Right Left Pain/Dysfunction with Movement Goal Right   3/8/2024  Left  3/8/2024    Hip Flexion  3/5 3/5 Lateral and posterior trunk lean,  Pain in bilateral hips and low back 4+/5 B 3 3   Hip Extension  NT NT   4+/5 B NT NT   Hip Abduction  3+/5 3+/5 Tested in seated 4+/5 B 3+ 3+   Knee Extension 3+/5 3+/5 Pain in bilateral knees 5/5 B 3+ 3+   Knee Flexion 3+/5 3+/5   5/5 B 3+ 3+   Hip IR 3/5 3/5 Pain in bilateral hips 4+/5 B 3 3   Hip ER 3/5 3/5 Pain in bilateral hips 4+/5 B 3 3   Ankle DF 3+/5 4-/5 No pain today 5/5 B 4+ 4+   Ankle PF 3/5 3+/5 No pain 5/5 B 4+ 4+   Ankle Inversion 3+/5 3+/5 No pain 5/5 B 4- 4-   Ankle Eversion 3+/5 3+/5 No pain 5/5 B 4- 4-         MUSCLE LENGTH:   Muscle Tested  Right Left  Goal   Hip Flexors [] Normal  [x] Limited [] Normal  [x] Limited Normal B   ITB / TFL [] Normal  [x] Limited [] Normal  [x] Limited Normal B   Hamstrings  [] Normal  [x] Limited [] Normal  [x] Limited Normal B   Piriformis  [] Normal  [x] Limited [] Normal  [x] Limited Normal B   Gastrocnemius  [] Normal  [x] Limited  [] Normal  [x] Limited Normal B   Soleus  [] Normal  [x] Limited [] Normal  [x] Limited Normal B        JOINT MOBILITY:   Joint Motion  Right Mobility  (spine) Left Mobility Goal   Thoracic PA  [x] Hypo     [] Normal     [] Hyper --- Normal   Lumbar PA [] Hypo     [x] Normal     [] Hyper --- Normal   Hip:  [] Hypo     [x] Normal     [] Hyper [] Hypo     [x] Normal     [] Hyper Normal   SIJ:  [] Hypo     [x] Normal     [] Hyper [] Hypo     [x] Normal     [] Hyper Normal       SPECIAL TESTS:    Right  (spine) Left  Goal   SLUMP [x] Positive    [] Negative [x] Positive    [] Negative Negative B    Straight Leg Raise [x] Positive    [] Negative [x] Positive    [] Negative Negative B    ASIS Compression [x] Positive    [] Negative --- Negative    ASIS Distraction  [x] Positive    [] Negative --- Negative    LONG [x] Positive    [] Negative [x] Positive    [] Negative Negative B          SENSATION  [x] Intact to Light Touch                       [] Impaired:        PALPATION: Muscles: Increased tone and tenderness to palpation of: bilateral gastrocnemius, soleus, peroneals, posterior tibialis, anterior tibialis, foot intrinsics. Structures: Increased tenderness to palpation of: bilateral LOWER STRUCTURES : plantar fascia        POSTURE:  Pt presents with postural abnormalities which include:               [x] Forward Head                                [] Increased Lumbar Lordosis              [x] Rounded Shoulder                         [] Genu Recurvatum              [] Increased Thoracic Kyphosis        [] Genu Valgus              [] Trunk Deviated                              [] Pes Planus              [] Scapular Winging                          [] Other:            GAIT ANALYSIS The patient ambulated with the following assistive device: none; the pt presents with the following gait abnormalities: bradykinetic, increased SARWAT, decreased step length bilateral, decreased heel strike bilateral, decreased push off  bilateral, decreased hip extension bilateral, and decreased pelvic/trunk rotation     FUNCTIONAL MOVEMENT PATTERNS  Movement Analysis Notes   Bed Mobility  []Functional  [x]Dysfunctional:  [x]Painful  []Non-Painful    Poor motor planning, decreased core strength, guarded movements   Transfers []Functional  [x]Dysfunctional:  [x]Painful  []Non-Painful    Poor motor planning, decreased core strength, guarded movements   Sit to Stand []Functional  [x]Dysfunctional:  [x]Painful  []Non-Painful    Poor motor planning, decreased core strength, guarded movements   Squat []Functional  []Dysfunctional:  []Painful  []Non-Painful    NT          Function:   Foot:    Lumbar Spine:      Treatment     Yolanda received the treatments listed below:     MANUAL THERAPY TECHNIQUES were applied for (0) minutes, including:    Manual Intervention Performed Today    Soft Tissue Mobilization [x]  [] bilateral  gastrocnemius, soleus, posterior tibialis, anterior tibialis , foot intrinsics    Joint Mobilizations [] Anterior and posterior mobs     []     []    Functional Dry Needling  []      Plan for Next Visit: Continue as needed         THERAPEUTIC EXERCISES to develop strength, endurance, ROM, flexibility, posture, and core stabilization for (8) minutes including:    Intervention Performed Today    Nustep  x 6 minutes, level 1   Bike  Attempted but patient requested to stop after 2 minutes   Gastrocnemius stretch   3 x 30 standing on wedge bilateral    Soleus stretch  3 x 30 standing on wedge bilateral    HEP established for low back x See Patient Instructions for details                         Plan for Next Visit:        NEUROMUSCULAR RE-EDUCATION ACTIVITIES to improve Balance, Coordination, Kinesthetic, Sense, Proprioception, and Posture for (33) minutes.  The following were included:    Intervention Performed Today    Toe yoga  3 minutes bilateral alternating    Toe abduction/adduction  3 minutes bilateral LE   Toe flexion/extension  3  minutes bilateral    Ankle circles   X 30 counter clockwise   X 30 clockwise    BAPS Board   2' each position (DF/PF, IV/EV, CW/CCW)   Arch press ups (added)   3 x 10 bilateral    Ankle active range of motion   Yellow band 3 x 10 long sitting with lower extremity elevated bilateral   Dorsiflexion   Plantarflexion  Inversion  Eversion                     Plan for Next Visit:      Patient Education and Home Exercises       Home Exercises Provided and Patient Education Provided     Education provided: (during session) minutes  PURPOSE: Patient educated on the impairments noted above and the effects of physical therapy intervention to improve overall condition and QOL.   EXERCISE: Patient was educated on all the above exercise prior/during/after for proper posture, positioning, and execution for safe performance with home exercise program.   STRENGTH: Patient educated on the importance of improved core and extremity strength in order to improve alignment of the spine and extremities with static positions and dynamic movement.   3/1/2024: educated on the importance of maintaining core stability with all activties of daily living   3/5/2024: added home exercise program to my chart and gave patient paper copy and gave patient yellow band    Written Home Exercises Provided: yes.  Exercises were reviewed and Yolanda was able to demonstrate them prior to the end of the session.  Yolanda demonstrated good  understanding of the education provided. See EMR under Patient Instructions for exercises provided during therapy sessions.    Assessment     Yolanda is a 53 y.o. female referred to outpatient Physical Therapy with a medical diagnosis of bilateral plantar fasciitis. Patient presents today with new referral for back pain and would like to shift treatment focus more to this area in upcoming visits. Pt presents with impairments in the following categories: IMPAIRMENTS: ROM, strength, endurance, muscle length, posture, gait mechanics,  core strength and stability, and functional movement patterns.    Yolanda is progressing well towards her goals.   Patient prognosis is Guarded.     Patient will continue to benefit from skilled outpatient physical therapy to address the deficits listed in the problem list box on initial evaluation, provide pt/family education and to maximize patient's level of independence in the home and community environment.     Patient's spiritual, cultural and educational needs considered and pt agreeable to plan of care and goals.       Anticipated Barriers for therapy: co-morbidities      Goals:   Short Term Goals:  6 weeks Status  Date Met   PAIN: Pt will report worst pain of 5/10 in order to progress toward max functional ability and improve quality of life. [x] Progressing  [] Met  [] Not Met     FUNCTION (FOOT): Patient will demonstrate improved function as indicated by a score of greater than or equal to 22 out of 100 on FOTO. [] Progressing  [x] Met  [] Not Met Met     FUNCTION (Lumbar): Patient will demonstrate improved function as indicated by a score of greater than or equal to 21 out of 100 on FOTO. [x] Progressing  [] Met  [] Not Met    STRENGTH: Patient will improve strength to 50% of stated goals, listed in objective measures above, in order to progress towards independence with functional activities. [x] Progressing  [] Met  [] Not Met     POSTURE: Patient will correct postural deviations in sitting and standing, to decrease pain and promote long term stability.  [x] Progressing  [] Met  [] Not Met Partially Met  3/8/2024    GAIT: Patient will demonstrate improved gait mechanics in order to improve functional mobility, improve quality of life, and decrease risk of further injury or fall.  [x] Progressing  [] Met  [] Not Met     HEP: Patient will demonstrate independence with HEP in order to progress toward functional independence. [] Progressing  [x] Met  [] Not Met 3/8/2024       Long Term Goals:  12 weeks Status  Date Met   PAIN: Pt will report worst pain of 3/10 in order to progress toward max functional ability and improve quality of life [x] Progressing  [] Met  [] Not Met     FUNCTION (Foot): Patient will demonstrate improved function as indicated by a score of greater than or equal to 34 out of 100 on FOTO. [] Progressing  [x] Met  [] Not Met 3/8/2024    FUNCTION (Lumbar): Patient will demonstrate improved function as indicated by a score of greater than or equal to 26 out of 100 on FOTO. [x] Progressing  [] Met  [] Not Met    MOBILITY: Patient will improve AROM to stated goals, listed in objective measures above, in order to return to maximal functional potential and improve quality of life.  [x] Progressing  [] Met  [] Not Met     STRENGTH: Patient will improve strength to stated goals, listed in objective measures above, in order to improve functional independence and quality of life.  [x] Progressing  [] Met  [] Not Met     GAIT: Patient will demonstrate normalized gait mechanics with minimal compensation in order to return to PLOF. [x] Progressing  [] Met  [] Not Met     Patient will return to normal ADL's, IADL's, community involvement, recreational activities, and work-related activities with less than or equal to 1/10 pain and maximal function.  [x] Progressing  [] Met  [] Not Met          Plan     Continue Plan of Care (POC) and progress per patient tolerance. See treatment section for details on planned progressions next session.    Updated Certification Period: 3/8/2024 to 6/6/2024  Recommended Treatment Plan: 2 times per week for 12 weeks: to include any combination of the following interventions: virtual visits, dry needling, modalities, electrical stimulation (IFC, Pre-Mod, Attended with Functional Dry Needling), Aquatic Therapy, Cervical/Lumbar Traction, Gait Training, Manual Therapy, Neuromuscular Re-ed, Patient Education, Self Care, Therapeutic Exercise, and Therapeutic Activites        Winifred Blanco,  PT

## 2024-03-08 NOTE — PROGRESS NOTES
HPI     Eye Problem            Comments: Pt states she sees smoke through [facility], she cannot tell if it is 1   eye or both. No pain or flashes or any new floaters. Pt states it has been   going on for 1 month. Pt states compliance with Xiidra and AFT sometimes   when she thinks about it           Last edited by Lorena Tejada MA on 3/8/2024  8:38 AM.            Assessment /Plan     For exam results, see Encounter Report.    Dry eye    Irregular astigmatism of both eyes    Myopia with presbyopia, bilateral    Symptoms likely caused by dryness  Stable today, no tears, holes or RD per exam and optos photos  Normal mOCT today OU  Pt wearing old spec rx today  Recommended updating spec Rx  BCVA 20/20 OU today  Continue Xiidra bid OU  Start iVizia bid OU    Eyeglass Final Rx       Eyeglass Final Rx         Sphere Cylinder Axis Add    Right -2.50 +0.50 090 +2.25    Left -3.50 +0.75 050 +2.25      Expiration Date: 3/8/2025                    RTC PRN  Otherwise, RTC as scheduled 6 months for repeat nancy  Discussed above and all questions were answered.

## 2024-03-08 NOTE — PLAN OF CARE
[facility]   Physical Therapy Treatment Note + Re-Evaluation       Name: Yolanda Betts  Clinic Number: [id number]    Therapy Diagnosis:   Encounter Diagnoses   Name Primary?    Plantar fasciitis, bilateral Yes    Decreased strength, endurance, and mobility     Decreased range of motion of both ankles     Bilateral foot pain        Physician: Sasha Sorenson MD, Kathy Galan NP    Visit Date: 3/8/2024    Physician Orders: PT Eval and Treat  Medical Diagnosis from Referral: bilateral plantar fasciitis   Evaluation Date: 2/9/2024  Authorization Period Expiration: 1/29/2025  Plan of Care Expiration: 5/9/2024  Progress Note Due: 4/7/2024  Visit # / Visits authorized: 5/20 (+ evaluation)  FOTO: 1/3 (last performed on 2/9/2024)      Precautions: Standard and Diabetes     PTA Visit #: 3/5     Time In: 0703  Time Out: 0801  Total Billable Time: 54 minutes Billing reflects [place] medicaid guidelines, billing all therapy as therapeutic-exercise     Subjective     Patient reports: that she is hurting so bad yesterday and today. She could barely get into her bed yesterday, and this morning is the same thing. Her pain is all the way down her spine, across the shoulders, and into her low back. Patient reports that she has been dealing with low back pain for years now, but it has come to the point where she feels a constant pain. Sometimes sharp, sometimes dull, but it is always there. Patient reports that last year she was diagnosed with ankylosing spondylitis. They started her on injections that she takes every 28 days. Patient states that they give her a muscle relaxer, but she has to beg for pain medication. Patient has tried steroid injections and ROCAEL's, but they have not provided any relief. Patient reports that she would like for treatment to focus more on the low back area at this time.     She was compliant with home exercise program.  Response to previous treatment: sore after  evaluation  Functional change: decreased standing and walking tolerance and pain with raising up on tip toes     Pain: 8/10  across shoulder and down spine   Location: 6/10 bilateral heels     Objective      Objective Measures updated at progress report or POC update only unless otherwise noted.     RANGE OF MOTION:   **numbers in () are from initial evaluation  Ankle/Foot AROM/PROM Right Left Pain/Dysfunction with Movement Goal   Dorsiflexion (20º) 8  (-10) 8  (-8)  10   Plantarflexion (50º) 44  (41) 50  (44)  50   Inversion (35º) 32  (24) 24  35   Eversion (15º) 15  (15) 15  (15) Pain in L foot 15      RANGE OF MOTION:   Lumbar ROM Right  (spine) Left   Pain/Dysfunction with Movement Goal   Lumbar Flexion (60º) 20 --- Pain throughout entire back and into both legs with each motion tested today 40   Lumbar Extension (30º) 4 ---  10   Lumbar Side Bending (25º) 10 15  25         STRENGTH:   L/E MMT Right Left Pain/Dysfunction with Movement Goal Right   3/8/2024  Left  3/8/2024    Hip Flexion  3/5 3/5 Lateral and posterior trunk lean,  Pain in bilateral hips and low back 4+/5 B 3 3   Hip Extension  NT NT   4+/5 B NT NT   Hip Abduction  3+/5 3+/5 Tested in seated 4+/5 B 3+ 3+   Knee Extension 3+/5 3+/5 Pain in bilateral knees 5/5 B 3+ 3+   Knee Flexion 3+/5 3+/5   5/5 B 3+ 3+   Hip IR 3/5 3/5 Pain in bilateral hips 4+/5 B 3 3   Hip ER 3/5 3/5 Pain in bilateral hips 4+/5 B 3 3   Ankle DF 3+/5 4-/5 No pain today 5/5 B 4+ 4+   Ankle PF 3/5 3+/5 No pain 5/5 B 4+ 4+   Ankle Inversion 3+/5 3+/5 No pain 5/5 B 4- 4-   Ankle Eversion 3+/5 3+/5 No pain 5/5 B 4- 4-         MUSCLE LENGTH:   Muscle Tested  Right Left  Goal   Hip Flexors [] Normal  [x] Limited [] Normal  [x] Limited Normal B   ITB / TFL [] Normal  [x] Limited [] Normal  [x] Limited Normal B   Hamstrings  [] Normal  [x] Limited [] Normal  [x] Limited Normal B   Piriformis  [] Normal  [x] Limited [] Normal  [x] Limited Normal B   Gastrocnemius  [] Normal  [x] Limited  [] Normal  [x] Limited Normal B   Soleus  [] Normal  [x] Limited [] Normal  [x] Limited Normal B        JOINT MOBILITY:   Joint Motion  Right Mobility  (spine) Left Mobility Goal   Thoracic PA  [x] Hypo     [] Normal     [] Hyper --- Normal   Lumbar PA [] Hypo     [x] Normal     [] Hyper --- Normal   Hip:  [] Hypo     [x] Normal     [] Hyper [] Hypo     [x] Normal     [] Hyper Normal   SIJ:  [] Hypo     [x] Normal     [] Hyper [] Hypo     [x] Normal     [] Hyper Normal       SPECIAL TESTS:    Right  (spine) Left  Goal   SLUMP [x] Positive    [] Negative [x] Positive    [] Negative Negative B    Straight Leg Raise [x] Positive    [] Negative [x] Positive    [] Negative Negative B    ASIS Compression [x] Positive    [] Negative --- Negative    ASIS Distraction  [x] Positive    [] Negative --- Negative    LONG [x] Positive    [] Negative [x] Positive    [] Negative Negative B          SENSATION  [x] Intact to Light Touch                       [] Impaired:        PALPATION: Muscles: Increased tone and tenderness to palpation of: bilateral gastrocnemius, soleus, peroneals, posterior tibialis, anterior tibialis, foot intrinsics. Structures: Increased tenderness to palpation of: bilateral LOWER STRUCTURES : plantar fascia        POSTURE:  Pt presents with postural abnormalities which include:               [x] Forward Head                                [] Increased Lumbar Lordosis              [x] Rounded Shoulder                         [] Genu Recurvatum              [] Increased Thoracic Kyphosis        [] Genu Valgus              [] Trunk Deviated                              [] Pes Planus              [] Scapular Winging                          [] Other:            GAIT ANALYSIS The patient ambulated with the following assistive device: none; the pt presents with the following gait abnormalities: bradykinetic, increased SARWAT, decreased step length bilateral, decreased heel strike bilateral, decreased push off  bilateral, decreased hip extension bilateral, and decreased pelvic/trunk rotation     FUNCTIONAL MOVEMENT PATTERNS  Movement Analysis Notes   Bed Mobility  []Functional  [x]Dysfunctional:  [x]Painful  []Non-Painful    Poor motor planning, decreased core strength, guarded movements   Transfers []Functional  [x]Dysfunctional:  [x]Painful  []Non-Painful    Poor motor planning, decreased core strength, guarded movements   Sit to Stand []Functional  [x]Dysfunctional:  [x]Painful  []Non-Painful    Poor motor planning, decreased core strength, guarded movements   Squat []Functional  []Dysfunctional:  []Painful  []Non-Painful    NT          Function:   Foot:    Lumbar Spine:      Treatment     Yolanda received the treatments listed below:     MANUAL THERAPY TECHNIQUES were applied for (0) minutes, including:    Manual Intervention Performed Today    Soft Tissue Mobilization [x]  [] bilateral  gastrocnemius, soleus, posterior tibialis, anterior tibialis , foot intrinsics    Joint Mobilizations [] Anterior and posterior mobs     []     []    Functional Dry Needling  []      Plan for Next Visit: Continue as needed         THERAPEUTIC EXERCISES to develop strength, endurance, ROM, flexibility, posture, and core stabilization for (8) minutes including:    Intervention Performed Today    Nustep  x 6 minutes, level 1   Bike  Attempted but patient requested to stop after 2 minutes   Gastrocnemius stretch   3 x 30 standing on wedge bilateral    Soleus stretch  3 x 30 standing on wedge bilateral    HEP established for low back x See Patient Instructions for details                         Plan for Next Visit:        NEUROMUSCULAR RE-EDUCATION ACTIVITIES to improve Balance, Coordination, Kinesthetic, Sense, Proprioception, and Posture for (33) minutes.  The following were included:    Intervention Performed Today    Toe yoga  3 minutes bilateral alternating    Toe abduction/adduction  3 minutes bilateral LE   Toe flexion/extension  3  minutes bilateral    Ankle circles   X 30 counter clockwise   X 30 clockwise    BAPS Board   2' each position (DF/PF, IV/EV, CW/CCW)   Arch press ups (added)   3 x 10 bilateral    Ankle active range of motion   Yellow band 3 x 10 long sitting with lower extremity elevated bilateral   Dorsiflexion   Plantarflexion  Inversion  Eversion                     Plan for Next Visit:      Patient Education and Home Exercises       Home Exercises Provided and Patient Education Provided     Education provided: (during session) minutes  PURPOSE: Patient educated on the impairments noted above and the effects of physical therapy intervention to improve overall condition and QOL.   EXERCISE: Patient was educated on all the above exercise prior/during/after for proper posture, positioning, and execution for safe performance with home exercise program.   STRENGTH: Patient educated on the importance of improved core and extremity strength in order to improve alignment of the spine and extremities with static positions and dynamic movement.   3/1/2024: educated on the importance of maintaining core stability with all activties of daily living   3/5/2024: added home exercise program to my chart and gave patient paper copy and gave patient yellow band    Written Home Exercises Provided: yes.  Exercises were reviewed and Yolanda was able to demonstrate them prior to the end of the session.  Yolanda demonstrated good  understanding of the education provided. See EMR under Patient Instructions for exercises provided during therapy sessions.    Assessment     Yolanda is a 53 y.o. female referred to outpatient Physical Therapy with a medical diagnosis of bilateral plantar fasciitis. Patient presents today with new referral for back pain and would like to shift treatment focus more to this area in upcoming visits. Pt presents with impairments in the following categories: IMPAIRMENTS: ROM, strength, endurance, muscle length, posture, gait mechanics,  core strength and stability, and functional movement patterns.    Yolanda is progressing well towards her goals.   Patient prognosis is Guarded.     Patient will continue to benefit from skilled outpatient physical therapy to address the deficits listed in the problem list box on initial evaluation, provide pt/family education and to maximize patient's level of independence in the home and community environment.     Patient's spiritual, cultural and educational needs considered and pt agreeable to plan of care and goals.       Anticipated Barriers for therapy: co-morbidities      Goals:   Short Term Goals:  6 weeks Status  Date Met   PAIN: Pt will report worst pain of 5/10 in order to progress toward max functional ability and improve quality of life. [x] Progressing  [] Met  [] Not Met     FUNCTION (FOOT): Patient will demonstrate improved function as indicated by a score of greater than or equal to 22 out of 100 on FOTO. [] Progressing  [x] Met  [] Not Met Met     FUNCTION (Lumbar): Patient will demonstrate improved function as indicated by a score of greater than or equal to 21 out of 100 on FOTO. [x] Progressing  [] Met  [] Not Met    STRENGTH: Patient will improve strength to 50% of stated goals, listed in objective measures above, in order to progress towards independence with functional activities. [x] Progressing  [] Met  [] Not Met     POSTURE: Patient will correct postural deviations in sitting and standing, to decrease pain and promote long term stability.  [x] Progressing  [] Met  [] Not Met Partially Met  3/8/2024    GAIT: Patient will demonstrate improved gait mechanics in order to improve functional mobility, improve quality of life, and decrease risk of further injury or fall.  [x] Progressing  [] Met  [] Not Met     HEP: Patient will demonstrate independence with HEP in order to progress toward functional independence. [] Progressing  [x] Met  [] Not Met 3/8/2024       Long Term Goals:  12 weeks Status  Date Met   PAIN: Pt will report worst pain of 3/10 in order to progress toward max functional ability and improve quality of life [x] Progressing  [] Met  [] Not Met     FUNCTION (Foot): Patient will demonstrate improved function as indicated by a score of greater than or equal to 34 out of 100 on FOTO. [] Progressing  [x] Met  [] Not Met 3/8/2024    FUNCTION (Lumbar): Patient will demonstrate improved function as indicated by a score of greater than or equal to 26 out of 100 on FOTO. [x] Progressing  [] Met  [] Not Met    MOBILITY: Patient will improve AROM to stated goals, listed in objective measures above, in order to return to maximal functional potential and improve quality of life.  [x] Progressing  [] Met  [] Not Met     STRENGTH: Patient will improve strength to stated goals, listed in objective measures above, in order to improve functional independence and quality of life.  [x] Progressing  [] Met  [] Not Met     GAIT: Patient will demonstrate normalized gait mechanics with minimal compensation in order to return to PLOF. [x] Progressing  [] Met  [] Not Met     Patient will return to normal ADL's, IADL's, community involvement, recreational activities, and work-related activities with less than or equal to 1/10 pain and maximal function.  [x] Progressing  [] Met  [] Not Met          Plan     Continue Plan of Care (POC) and progress per patient tolerance. See treatment section for details on planned progressions next session.    Updated Certification Period: 3/8/2024 to 6/6/2024  Recommended Treatment Plan: 2 times per week for 12 weeks: to include any combination of the following interventions: virtual visits, dry needling, modalities, electrical stimulation (IFC, Pre-Mod, Attended with Functional Dry Needling), Aquatic Therapy, Cervical/Lumbar Traction, Gait Training, Manual Therapy, Neuromuscular Re-ed, Patient Education, Self Care, Therapeutic Exercise, and Therapeutic Activites        Winifred Blanco,  PT

## 2024-03-11 ENCOUNTER — LAB VISIT (OUTPATIENT)
Dept: LAB | Facility: HOSPITAL | Age: 54
End: 2024-03-11
Attending: STUDENT IN AN ORGANIZED HEALTH CARE EDUCATION/TRAINING PROGRAM
Payer: MEDICAID

## 2024-03-11 ENCOUNTER — CLINICAL SUPPORT (OUTPATIENT)
Dept: DIABETES | Facility: CLINIC | Age: 54
End: 2024-03-11
Payer: MEDICAID

## 2024-03-11 DIAGNOSIS — M47.819 SPONDYLOARTHROPATHY: ICD-10-CM

## 2024-03-11 DIAGNOSIS — M45.9 ANKYLOSING SPONDYLITIS, UNSPECIFIED SITE OF SPINE: ICD-10-CM

## 2024-03-11 DIAGNOSIS — Z79.4 TYPE 2 DIABETES MELLITUS WITH HYPERGLYCEMIA, WITH LONG-TERM CURRENT USE OF INSULIN: Primary | Chronic | ICD-10-CM

## 2024-03-11 DIAGNOSIS — Z79.899 HIGH RISK MEDICATION USE: ICD-10-CM

## 2024-03-11 DIAGNOSIS — E11.65 TYPE 2 DIABETES MELLITUS WITH HYPERGLYCEMIA, WITH LONG-TERM CURRENT USE OF INSULIN: Primary | Chronic | ICD-10-CM

## 2024-03-11 LAB
ALBUMIN SERPL BCP-MCNC: 4.2 G/DL (ref 3.5–5.2)
ALP SERPL-CCNC: 92 U/L (ref 55–135)
ALT SERPL W/O P-5'-P-CCNC: 22 U/L (ref 10–44)
ANION GAP SERPL CALC-SCNC: 5 MMOL/L (ref 8–16)
AST SERPL-CCNC: 15 U/L (ref 10–40)
BASOPHILS # BLD AUTO: 0.08 K/UL (ref 0–0.2)
BASOPHILS NFR BLD: 1.2 % (ref 0–1.9)
BILIRUB SERPL-MCNC: 0.3 MG/DL (ref 0.1–1)
BUN SERPL-MCNC: 13 MG/DL (ref 6–20)
CALCIUM SERPL-MCNC: 9.8 MG/DL (ref 8.7–10.5)
CHLORIDE SERPL-SCNC: 107 MMOL/L (ref 95–110)
CO2 SERPL-SCNC: 27 MMOL/L (ref 23–29)
CREAT SERPL-MCNC: 0.8 MG/DL (ref 0.5–1.4)
CRP SERPL-MCNC: 14.6 MG/L (ref 0–8.2)
DIFFERENTIAL METHOD BLD: ABNORMAL
EOSINOPHIL # BLD AUTO: 0.3 K/UL (ref 0–0.5)
EOSINOPHIL NFR BLD: 4.2 % (ref 0–8)
ERYTHROCYTE [DISTWIDTH] IN BLOOD BY AUTOMATED COUNT: 15 % (ref 11.5–14.5)
EST. GFR  (NO RACE VARIABLE): >60 ML/MIN/1.73 M^2
GLUCOSE SERPL-MCNC: 94 MG/DL (ref 70–110)
HCT VFR BLD AUTO: 38.6 % (ref 37–48.5)
HGB BLD-MCNC: 11.9 G/DL (ref 12–16)
IMM GRANULOCYTES # BLD AUTO: 0.01 K/UL (ref 0–0.04)
IMM GRANULOCYTES NFR BLD AUTO: 0.1 % (ref 0–0.5)
LYMPHOCYTES # BLD AUTO: 2.9 K/UL (ref 1–4.8)
LYMPHOCYTES NFR BLD: 41.6 % (ref 18–48)
MCH RBC QN AUTO: 25.7 PG (ref 27–31)
MCHC RBC AUTO-ENTMCNC: 30.8 G/DL (ref 32–36)
MCV RBC AUTO: 83 FL (ref 82–98)
MONOCYTES # BLD AUTO: 0.5 K/UL (ref 0.3–1)
MONOCYTES NFR BLD: 6.6 % (ref 4–15)
NEUTROPHILS # BLD AUTO: 3.2 K/UL (ref 1.8–7.7)
NEUTROPHILS NFR BLD: 46.3 % (ref 38–73)
NRBC BLD-RTO: 0 /100 WBC
PLATELET # BLD AUTO: 433 K/UL (ref 150–450)
PMV BLD AUTO: 9.2 FL (ref 9.2–12.9)
POTASSIUM SERPL-SCNC: 4.9 MMOL/L (ref 3.5–5.1)
PROT SERPL-MCNC: 7.3 G/DL (ref 6–8.4)
RBC # BLD AUTO: 4.63 M/UL (ref 4–5.4)
SODIUM SERPL-SCNC: 139 MMOL/L (ref 136–145)
WBC # BLD AUTO: 6.94 K/UL (ref 3.9–12.7)

## 2024-03-11 PROCEDURE — 99999PBSHW PR PBB SHADOW TECHNICAL ONLY FILED TO HB: Mod: PBBFAC,,,

## 2024-03-11 PROCEDURE — 85025 COMPLETE CBC W/AUTO DIFF WBC: CPT | Performed by: STUDENT IN AN ORGANIZED HEALTH CARE EDUCATION/TRAINING PROGRAM

## 2024-03-11 PROCEDURE — G0108 DIAB MANAGE TRN  PER INDIV: HCPCS | Mod: PBBFAC

## 2024-03-11 PROCEDURE — 36415 COLL VENOUS BLD VENIPUNCTURE: CPT | Performed by: STUDENT IN AN ORGANIZED HEALTH CARE EDUCATION/TRAINING PROGRAM

## 2024-03-11 PROCEDURE — 99213 OFFICE O/P EST LOW 20 MIN: CPT | Mod: PBBFAC

## 2024-03-11 PROCEDURE — 99999 PR PBB SHADOW E&M-EST. PATIENT-LVL III: CPT | Mod: PBBFAC,,,

## 2024-03-11 PROCEDURE — 80053 COMPREHEN METABOLIC PANEL: CPT | Performed by: STUDENT IN AN ORGANIZED HEALTH CARE EDUCATION/TRAINING PROGRAM

## 2024-03-11 PROCEDURE — 86140 C-REACTIVE PROTEIN: CPT | Performed by: STUDENT IN AN ORGANIZED HEALTH CARE EDUCATION/TRAINING PROGRAM

## 2024-03-11 NOTE — PROGRESS NOTES
Diabetes Care Specialist Follow-up Note  Author: Mirlande Lee RN  Date: 3/11/2024    Program Intake  Reason for Diabetes Program Visit:: Intervention  Type of Intervention:: Individual  Individual: Education  Education: Self-Management Skill Review  Current diabetes risk level:: high  In the last 12 months, have you:: been admitted to a hospital  Was the ER or hospital admission related to diabetes?: No  Permission to speak with others about care:: no  Continuous Glucose Monitoring  Patient has CGM: Yes  Personal CGM type:: Dexcom G6    Lab Results   Component Value Date    HGBA1C 6.5 (H) 11/29/2023     A1c Pre Diabetes Care Specialist Intervention:  6.5%    CURRENT DM MEDICATIONS:   Omnipod 5--See care plan for further details.    Physical activity/Exercise:   Not addressed.    SMBG: Clarity not synced to today's date.     During today's follow-up visit,  the following areas required further assessment and content was provided/reviewed.    Based on today's diabetes care assessment, the following areas of need were identified:          2/6/2024    12:01 AM   Social   Support No   Access to Mass Media/Tech No   Cognitive/Behavioral Health No   Culture/Jehovah's witness No   Communication No   Health Literacy No            2/6/2024    12:01 AM   Clinical   Medication Adherence No   Lab Compliance No   Nutritional Status No            2/6/2024    12:01 AM   Diabetes Self-Management Skills   Medication See care plan for update.           Today's interventions were provided through individual discussion, instruction, and written materials were provided.    Patient verbalized understanding of instruction and written materials.  Pt was able to return back demonstration of instructions today. Patient understood key points, needs reinforcement and further instruction.     Diabetes Self-Management Care Plan Review and Evaluation of Progress:    During today's follow-up Yolanda's Diabetes Self-Management Care Plan progress was  reviewed and progress was evaluated including his/her input. Yolanda has agreed to continue his/her journey to improve/maintain overall diabetes control by continuing to set health goals. See care plan progress below.      Care Plan: Diabetes Management   Updates made since 2/10/2024 12:00 AM        Problem: Medications         Goal: Patient Agrees to take Diabetes Medication(s) as prescribed.    Start Date: 2/6/2024   Expected End Date: 2/5/2025   This Visit's Progress: Deferred   Recent Progress: On track   Priority: High   Barriers: No Barriers Identified   Note:    Discussed pre-pump topics:   Pump options w/ compatible CGM: Medtronic using Guardian, OmniPod and Tandem using Dexcom G6  Common terms: basal/bolus insulin, IC, ISF, TBS, IOB  Pros/cons pump therapy, supplies needed, frequency of changing infusion sets and cartridges/pods, and backup pump plan   Allowed pt to see demo pumps and example infusion set    Reviewed need for carb counting accuracy prior to pump initiation. Provided carb counting training using food lists, food label from Diabetes Management Guide. Also, encouraged pt to download and use Always Prepped gill for additional support.     Pt demonstrated understanding carb ctg using clinic examples and would like to proceed w/ OmniPod5 and Dexcom G6. She agrees to contact clinic for training appt once devices received. Updates forwarded to provider Abner and encouraged upcoming provider visit for medical mgmt.     2/26/24:     OMNIPOD 5 INSULIN PUMP START  Pump training was provided per Omni Pod protocol.     Patient understands she will no longer take Toujeo injections daily.  Details of pump therapy were covered included following: controller features and programming, pod activation, pod site selection and rotation, automatic pod priming and insertion, setting & editing basal rates in manual mode, giving bolus and other features in the set up menu.  Patient demonstrated ability to program  controller, activate and insert pod using aseptic technique.  Patient demonstrated ability to program Dexcom transmitter into controller and start automated limited mode.    Instructed pt on use of basic pump features ie...give a bolus, pause insulin, switch from manual to automated mode.  Reviewed features available in manual mode verses automated mode.   Reviewed when and how to use activity function in automated mode.  Reviewed site selection of pods, rotation of sites and hard stop to change pod every 72 hrs.   Instructed that insulin vial is good out of refrigeration for up to 28-30 days .   Reviewed treatment of hypoglycemia, hyperglycemia; sick day care, DKA, and troubleshooting of pump.  Omni Pod 24 hour support can be reached at [phone].     INITIAL SETTINGS: (per provider Elizabeth Ceron NP)    Basal rate: 0.4 units/hr  Maximum basal: 2 units/hr     Bolus Menu:  ISF: 1:89 mg/dL  Carb Ratio: 1:24 grams  Blood glucose target: 120 mg/dL; correct above: 130 mg/dL  Active insulin: 3 hrs  Maximum bolus: 25 units  Reverse Correction: Off    Low pod insulin: 20 units  Pod expiration alarm:  4 hours    Podder ID username: [email or web address]    [employer] username: [email or web address]    Patient has written materials for Omnipod 5 for home use.  Patient verbalized understanding of all instructions given.  Reviewed back up plan in case of pump malfunction.  Educated that if glucose levels increasing and patient is delivering insulin, it is recommended to change pod.      3/11/24: Ms. Betts is concerned about her pump settings; states last week she was having lows in the 60s and this week having highs up to the 260s. Unable to see [employer] data, called [employer] customer service. Accounts were not linked; re-linked accounts but will not be able to see data until she is home and connected to WiFi. Did advise her to wait 10-15 minutes between bolusing and eating to prevent PP hyperglycemia. Also assisted with downloading My  Fitness Pal and Calorie Bk to help with more accurate carb counting. Rescheduled her follow-up appt for Wednesday, March 13th at 7am with Yara Aburto RD.            Follow Up Plan     Follow up in about 2 days (around 3/13/2024) for Omnipod review.    Today's care plan and follow up schedule was discussed with patient.  Yolanda verbalized understanding of the care plan, goals, and agrees to follow up plan.        The patient was encouraged to communicate with his/her health care provider/physician and care team regarding his/her condition(s) and treatment.  I provided the patient with my contact information today and encouraged to contact me via phone or [facility] Patient Portal as needed.     Length of Visit   Total Time: 30 Minutes

## 2024-03-12 ENCOUNTER — TELEPHONE (OUTPATIENT)
Dept: DIABETES | Facility: CLINIC | Age: 54
End: 2024-03-12
Payer: MEDICAID

## 2024-03-13 ENCOUNTER — CLINICAL SUPPORT (OUTPATIENT)
Dept: RHEUMATOLOGY | Facility: CLINIC | Age: 54
End: 2024-03-13
Payer: MEDICAID

## 2024-03-13 ENCOUNTER — TELEPHONE (OUTPATIENT)
Dept: RHEUMATOLOGY | Facility: CLINIC | Age: 54
End: 2024-03-13

## 2024-03-13 ENCOUNTER — CLINICAL SUPPORT (OUTPATIENT)
Dept: DIABETES | Facility: CLINIC | Age: 54
End: 2024-03-13
Payer: MEDICAID

## 2024-03-13 ENCOUNTER — TELEPHONE (OUTPATIENT)
Dept: RHEUMATOLOGY | Facility: CLINIC | Age: 54
End: 2024-03-13
Payer: MEDICAID

## 2024-03-13 ENCOUNTER — CLINICAL SUPPORT (OUTPATIENT)
Dept: REHABILITATION | Facility: HOSPITAL | Age: 54
End: 2024-03-13
Payer: MEDICAID

## 2024-03-13 ENCOUNTER — TELEPHONE (OUTPATIENT)
Dept: PAIN MEDICINE | Facility: CLINIC | Age: 54
End: 2024-03-13
Payer: MEDICAID

## 2024-03-13 ENCOUNTER — TELEPHONE (OUTPATIENT)
Dept: DIABETES | Facility: CLINIC | Age: 54
End: 2024-03-13
Payer: MEDICAID

## 2024-03-13 DIAGNOSIS — E11.65 TYPE 2 DIABETES MELLITUS WITH HYPERGLYCEMIA, WITH LONG-TERM CURRENT USE OF INSULIN: Primary | Chronic | ICD-10-CM

## 2024-03-13 DIAGNOSIS — Z78.9 DECREASED ACTIVITIES OF DAILY LIVING (ADL): ICD-10-CM

## 2024-03-13 DIAGNOSIS — M45.9 ANKYLOSING SPONDYLITIS, UNSPECIFIED SITE OF SPINE: Primary | ICD-10-CM

## 2024-03-13 DIAGNOSIS — R68.89 DECREASED STRENGTH, ENDURANCE, AND MOBILITY: ICD-10-CM

## 2024-03-13 DIAGNOSIS — Z74.09 DECREASED STRENGTH, ENDURANCE, AND MOBILITY: ICD-10-CM

## 2024-03-13 DIAGNOSIS — M79.671 BILATERAL FOOT PAIN: ICD-10-CM

## 2024-03-13 DIAGNOSIS — M25.672 DECREASED RANGE OF MOTION OF BOTH ANKLES: ICD-10-CM

## 2024-03-13 DIAGNOSIS — R29.898 DECREASED GRIP STRENGTH: Primary | ICD-10-CM

## 2024-03-13 DIAGNOSIS — R52 PAIN: ICD-10-CM

## 2024-03-13 DIAGNOSIS — Z79.4 TYPE 2 DIABETES MELLITUS WITH HYPERGLYCEMIA, WITH LONG-TERM CURRENT USE OF INSULIN: Primary | Chronic | ICD-10-CM

## 2024-03-13 DIAGNOSIS — R53.1 DECREASED STRENGTH, ENDURANCE, AND MOBILITY: ICD-10-CM

## 2024-03-13 DIAGNOSIS — M25.671 DECREASED RANGE OF MOTION OF BOTH ANKLES: ICD-10-CM

## 2024-03-13 DIAGNOSIS — M72.2 PLANTAR FASCIITIS, BILATERAL: Primary | ICD-10-CM

## 2024-03-13 DIAGNOSIS — M79.672 BILATERAL FOOT PAIN: ICD-10-CM

## 2024-03-13 PROCEDURE — 96372 THER/PROPH/DIAG INJ SC/IM: CPT | Mod: PBBFAC

## 2024-03-13 PROCEDURE — 97110 THERAPEUTIC EXERCISES: CPT

## 2024-03-13 PROCEDURE — 99999 PR PBB SHADOW E&M-EST. PATIENT-LVL III: CPT | Mod: PBBFAC,,,

## 2024-03-13 PROCEDURE — 97035 APP MDLTY 1+ULTRASOUND EA 15: CPT

## 2024-03-13 PROCEDURE — 97110 THERAPEUTIC EXERCISES: CPT | Performed by: PHYSICAL THERAPIST

## 2024-03-13 PROCEDURE — 98960 EDU&TRN PT SELF-MGMT NQHP 1: CPT | Mod: 95,,, | Performed by: DIETITIAN, REGISTERED

## 2024-03-13 PROCEDURE — 99999PBSHW PR PBB SHADOW TECHNICAL ONLY FILED TO HB: Mod: PBBFAC,,,

## 2024-03-13 PROCEDURE — 99213 OFFICE O/P EST LOW 20 MIN: CPT | Mod: PBBFAC,25

## 2024-03-13 PROCEDURE — 97140 MANUAL THERAPY 1/> REGIONS: CPT

## 2024-03-13 RX ORDER — BETAMETHASONE SODIUM PHOSPHATE AND BETAMETHASONE ACETATE 3; 3 MG/ML; MG/ML
6 INJECTION, SUSPENSION INTRA-ARTICULAR; INTRALESIONAL; INTRAMUSCULAR; SOFT TISSUE
Status: DISCONTINUED | OUTPATIENT
Start: 2024-03-13 | End: 2024-03-13

## 2024-03-13 RX ORDER — TRIAMCINOLONE ACETONIDE 40 MG/ML
40 INJECTION, SUSPENSION INTRA-ARTICULAR; INTRAMUSCULAR
Status: COMPLETED | OUTPATIENT
Start: 2024-03-13 | End: 2024-03-13

## 2024-03-13 RX ORDER — TRAMADOL HYDROCHLORIDE 50 MG/1
50 TABLET ORAL EVERY 6 HOURS PRN
Qty: 28 TABLET | Refills: 0 | Status: SHIPPED | OUTPATIENT
Start: 2024-03-13 | End: 2024-03-20

## 2024-03-13 RX ADMIN — TRIAMCINOLONE ACETONIDE 40 MG: 40 INJECTION, SUSPENSION INTRA-ARTICULAR; INTRAMUSCULAR at 03:03

## 2024-03-13 NOTE — TELEPHONE ENCOUNTER
----- Message from Elizabeth Ceron NP sent at 3/13/2024 12:46 PM CDT -----  I agree, she is prone to lows. I think starting with a 10% reduction may be good. Let me know if you think a lesser or greater adjustment would be better. Thank you for all that you do !!!  ----- Message -----  From: Yara Sanchez RD, CDE  Sent: 3/13/2024   9:39 AM CDT  To: IRASEMA Brown,   Met w/ Ms Yolanda today for Omni5 training follow-up. Her glooko reports are sharing well and saved to media. Initially, she was having low BG patterns; but, automated mode has corrected. She uses Dexcom well, but I'm wondering if you think her basal rates need to be lowered for if ever she's running Pod in manual mode? I reviewed this with her and encouraged consistency of Dexcom use, automated mode. Also, we reviewed Activity feature since she has upcoming PT appts. Thank you, Yara

## 2024-03-13 NOTE — TELEPHONE ENCOUNTER
Called pt and pt informed me that she was having back pain and wanted pain meds I informed her that we could schedule her an appt so that she can speak with the provider regarding pain. Pt verbalized understanding. I informed her that we will get someone to schedule her for an appt. Pt verbalized understanding.    Alyson TRAN

## 2024-03-13 NOTE — PROGRESS NOTES
Administered 1 cc DepoMedrol 40mg/cc  to left ventrogluteal. Pt tolerated well. No acute reaction noted to site. Pt instructed on S/S to report. Advised patient to wait in lobby 15 minutes after receiving injection to monitor for any reactions..  Pt verbalized understanding.     Lot: [id number]  Exp: 12/31/2024            Side Effects:  appetite changes, glucose intolerance, insomnia, diaphoresis (sweating), elevated blood pressure, ecchymosis (bruising), rash, headache, injection site reaction/pain, anaphylaxis.

## 2024-03-13 NOTE — PROGRESS NOTES
Diabetes Care Specialist Virtual Visit Note       The patient location is: home in [place]  The chief complaint leading to consultation is: Diabetes  Visit type: audiovisual  Total time spent with patient: 30 min   Each patient to whom he or she provides medical services by telemedicine is:  (1) informed of the relationship between the physician and patient and the respective role of any other health care provider with respect to management of the patient; and (2) notified that he or she may decline to receive medical services by telemedicine and may withdraw from such care at any time.      Diabetes Care Specialist Follow-up Note  Author: Yara Sanchez RD, CDE  Date: 3/14/2024    Program Intake  Reason for Diabetes Program Visit:: Intervention (DM referral 2/5/24 Elizabeth Ceron, IRASEMA)  Type of Intervention:: Individual  Education: Self-Management Skill Review  Device Training: Insulin Pump Start (OmniPod5 (training 2/26/24))  Current diabetes risk level:: high  Permission to speak with others about care:: no  Continuous Glucose Monitoring  Patient has CGM: Yes  Personal CGM type:: Dexcom G6  GMI Date: 03/13/24  GMI Value: 6.1 %    Lab Results   Component Value Date    HGBA1C 6.5 (H) 11/29/2023     Clinical    Problem Review  Active comorbidities affecting diabetes self-care.:  (HTN, HLD, Obesity, Polyneuropathy, Schizoaffective Bipolar type, NAFLD, IBS, GERD. Bariatric sleeve gastrectomy 6/1/21, Vit D defn, Iron defn anemia.)    Clinical Assessment  Current Diabetes Treatment:  (Novolog via OmniPod5 (training 2/26/24). Dexcom G6.)  Have you ever experienced hypoglycemia (low blood sugar)?: yes (Noted low BG patterns ~2weeks ago. Omni5 automated system appears to have adjusted with paused basal insulin noted throughout reports. Pt reports lows not related to Dexcom sensor accuracy.)  Are you able to tell when your blood sugar is low?: Yes (Dexcom alerts)  What symptoms do you experience?: Shaky, Irritable  Have you  ever been hospitalized because your blood sugar was too low?: no  How do you treat hypoglycemia (low blood sugar)?: 1/2 can soda/fruit juice, 5-6 pieces of hard candy (food)  Have you ever experienced hyperglycemia (high blood sugar)?: no (Dexcom alerts; pt denies symptoms.)    Medication Information  How many days a week do you miss your medications?: Never  Do you sometimes have difficulty refilling your medications?: No  Medication adherence impacting ability to self-manage diabetes?: No    Nutritional Status  Diet:  (deferred due time device follow-up)    Physical activity/Exercise: noted upcoming PT sessions    SMBG:              Today's interventions were provided through individual discussion, instruction, and written materials were provided.    Patient verbalized understanding of instruction and written materials.  Pt was able to return back demonstration of instructions today. Patient understood key points, needs reinforcement and further instruction.     Diabetes Self-Management Care Plan Review and Evaluation of Progress:  During today's follow-up Kimberlees Diabetes Self-Management Care Plan progress was reviewed and progress was evaluated including his/her input. Yolanda has agreed to continue his/her journey to improve/maintain overall diabetes control by continuing to set health goals. See care plan progress below.      Care Plan: Diabetes Management   Updates made since 2/13/2024 12:00 AM        Problem: Medications         Goal: Patient Agrees to take Diabetes Medication(s) as prescribed.    Start Date: 2/6/2024   Expected End Date: 2/5/2025   This Visit's Progress: On track   Recent Progress: Deferred   Priority: High   Barriers: No Barriers Identified   Note:    Encouraged overall progress. Pt doing well using Omni5 and Dexcom G6. She reports having carb ctg tools to support bolus accuracy and doing well on bolus timing.  Encouraged consistency of Dexcom usage and staying in automated mode.       Message  to provider Abner regarding basal insulin eval for times when Pod in manual mode.          Follow Up Plan   Follow up in about 3 months (around 6/13/2024).  -review [facility] reports  -eval goals    Today's care plan and follow up schedule was discussed with patient.  Yolanda verbalized understanding of the care plan, goals, and agrees to follow up plan.        The patient was encouraged to communicate with his/her health care provider/physician and care team regarding his/her condition(s) and treatment.  I provided the patient with my contact information today and encouraged to contact me via phone or [facility] Patient Portal as needed.     Length of Visit   Total Time: 30 Minutes

## 2024-03-13 NOTE — TELEPHONE ENCOUNTER
----- Message from Usha Jewell sent at 3/13/2024 12:06 PM CDT -----  Patient stated she is in severe pain and requesting a call back at .[phone]. She said it is urgent. Thx.EL

## 2024-03-13 NOTE — TELEPHONE ENCOUNTER
Patient encounter for concerns with decreasing efficacy of Cosentyx. Presented to the office today for Kenalog injection. Opening encounter for clinical workup and therapy recommendations.

## 2024-03-13 NOTE — TELEPHONE ENCOUNTER
----- Message from Milagros Bonilla sent at 3/13/2024  7:25 AM CDT -----  Contact: pt  Pt is in severe back pain and need to speak to the nurse asap  please call at [phone]

## 2024-03-13 NOTE — TELEPHONE ENCOUNTER
Spoke with pt. She was at [facility] today for PT and came up to diabetes dept for basal rate setting adjustment. Provided overview of reason for change.     Per collaboration w/ provider Abner, assisted in changing basal rate from 0.4 to 0.35. Resumed automated system.     Pt verbalized understanding of education provided and agrees to maintain clinic contact prn. Successful outreach.

## 2024-03-13 NOTE — TELEPHONE ENCOUNTER
----- Message from Milagros Bonilla sent at 3/13/2024  7:28 AM CDT -----  Contact: pt  Pt is in severe back pain and need to speak to the nurse asap  please call at [phone]

## 2024-03-13 NOTE — PROGRESS NOTES
[facility]  Occupational Therapy Treatment Note     Date: 3/13/2024  Name: Yolanda Betts  Clinic Number: [id number]    Therapy Diagnosis:   Encounter Diagnoses   Name Primary?    Decreased  strength Yes    Decreased activities of daily living (ADL)     Pain      Physician: Neville Roth,*    Physician Orders: Occupational Therapy to Evaluate and Treat  Medical Diagnosis:   G56.03 (ICD-10-CM) - Bilateral carpal tunnel syndrome   Z98.890 (ICD-10-CM) - S/p bilateral carpal tunnel release      Surgical Procedure and Date: Bilateral carpal tunnel release, 12/18/2023     Evaluation Date: 3/5/2024  Insurance Authorization Period Expiration: 2/27/2023 - 12/31/2024  Plan of Care Certification Period: 3/5/2024 - 6/5/2024  Progress Note Due: 4/5/2024      Date of Return to MD: N/A  Visit # / Visits authorized: 1 / 24  FOTO: 1/3     Precautions:  Standard     Time In: 2:00  Time Out: 3:00  Total Appointment Time (timed & untimed codes): 60 minutes    (billing reflects skilled 1:1 time)      Subjective     Patient reports: no problems & good compliance w/ initial HEP   She was compliant with home exercise program given last session.   Response to previous treatment: No adverse reactions noted or reported   Functional change: None reported at first treatment after initial evaluation     Pain: 2-3/10  Location: bilateral wrists      Objective     Objective Measures updated at progress report unless specified.    Treatment     Yolanda received the treatments listed below:      Direct contact modalities after being cleared for contraindications: 3.0 MHz, .8 W/cm2, 50% pulsed, 8 min to each hand (16 minutes total), B wrist/hand surgical site, to decrease pain & edema, increase circulation and tissue extensibility    Manual therapy techniques: Myofacial release, Manual Lymphatic Drainage, Soft tissue Mobilization, and scar management were applied to the: B forearm/wrist/hand for 28 minutes,  including:   use of hand techniques, cupping and IASTM tools to increase blood flow/circulation, improve soft tissue pliability and decrease pain.     Therapeutic exercises to develop strength, endurance, and ROM for 16 minutes, including:  AROM:    -Wrist F/E/RD/UD 10 reps each   -TGE's (wave,hook, full & straight fist) 5 reps each   -Thumb/pinky slides 10 reps    Dexterciser 3 min   Isospheres 3 min         Patient Education and Home Exercises     Education provided:   - Reviewed HEP  - Progress towards goals     Written Home Exercises Provided: Patient instructed to cont prior HEP.  Exercises were reviewed and Yolanda was able to demonstrate them prior to the end of the session.  Yolanda demonstrated good  understanding of the home exercise program provided. See electronic medical record under Patient Instructions for exercises provided during therapy sessions.       Assessment     Pain in B wrist/hands is minimal.  Scar tissue density is moderately dense in the R wrist/hand and moderate to minimally dense in the L wrist/hand.  Patient was able to perform all above listed therapeutic exercises without increased pain or difficulty today.     Yolanda is progressing well towards her goals and there are no updates to goals at this time. Pt prognosis is Good.     Patient will continue to benefit from skilled outpatient occupational therapy to address the deficits listed in the problem list on initial evaluation provide patient/family education and to maximize patient's level of independence in the home and community environment.     Patient's spiritual, cultural and educational needs considered and patient agreeable to plan of care and goals.    Anticipated barriers to occupational therapy: comorbid diagnosis, compliance with HEP and attendance to therapy as recommended     Goals:  Short Term Goals:  6 weeks  Progress       Pain: Patient will demonstrate improved pain by reports of less than or equal to 3/10 worst pain on the  verbal rating scale in order to progress toward maximal functional ability and improve quality of life. PC   Function: Patient will demonstrate improved function as indicated by a functional intake score of more than or equal to 40 out of 100 on FOTO. PC   Mobility: Patient will improve AROM to 50% of stated goals, listed in objective measures above, in order to progress towards independence with functional activities.  PC   Strength: Patient will improve strength to 50% of stated goals, listed in objective measures above, in order to progress towards independence with functional activities.  PC   HEP: Patient will demonstrate independence with HEP in order to progress toward functional independence. PC      Long Term Goals:  12 weeks  Progress      Pain: Patient will demonstrate improved pain by reports of less than or equal to 2/10 worst pain on the verbal rating scale in order to progress toward maximal functional ability and improve quality of life.   PC   Function: Patient will demonstrate improved function as indicated by a functional intake score of more than or equal to 52 out of 100 on FOTO. PC   Mobility: Patient will improve AROM to stated goals, listed in objective measures above, in order to return to maximal functional potential and improve quality of life. PC   Strength: Patient will improve strength to stated goals, listed in objective measures above, in order to improve functional independence and quality of life. PC   Patient will return to normal ADL's, IADL's, community involvement, recreational activities, and work-related activities with less than or equal to 1/10 pain and maximal function.  PC      Goals Key:  PC= Progressing/Continue;       PM= Partially Met;       Goal Met= Goal Met      DC= Discontinue     Plan     Plan of Care Certification: 3/5/2024 to 6/5/2024.      Outpatient Occupational Therapy 1-2 times weekly for 12 weeks to include the following interventions:  Therapeutic  Exercise, Functional Activities, Patient Education, Home Exercise Program, ADL Training, Transfer/Mobility Training, Manual Therapy, Neuromuscular Reeducation/Sensory, Electrical Stimulation/TENS/Interferential, Moist Heat/Ice/Paraffin, Cognitive Preceptual Retraining, Orthotic Fabrication/Fit/Management, Ultrasound/Phonophoresis, and Edema Control/Fluidotherapy.  Updates/Grading for next session: progress ROM, as tolerated    Torrey Huston OT   3/13/2024

## 2024-03-13 NOTE — Clinical Note
Bebeto Johnson,  Met w/ Ms Yolanda today for Omni5 training follow-up. Her glooko reports are sharing well and saved to media. Initially, she was having low BG patterns; but, automated mode has corrected. She uses Dexcom well, but I'm wondering if you think her basal rates need to be lowered for if ever she's running Pod in manual mode? I reviewed this with her and encouraged consistency of Dexcom use, automated mode. Also, we reviewed Activity feature since she has upcoming PT appts. Thank you, Yara

## 2024-03-13 NOTE — PROGRESS NOTES
[facility]   Physical Therapy Treatment Note        Name: Yolanda Betts  Clinic Number: [id number]    Therapy Diagnosis:   Encounter Diagnoses   Name Primary?    Plantar fasciitis, bilateral Yes    Decreased strength, endurance, and mobility     Decreased range of motion of both ankles     Bilateral foot pain        Physician: Sasha Sorenson MD, Kathy Galan NP    Visit Date: 3/13/2024    Physician Orders: PT Eval and Treat  Medical Diagnosis from Referral: bilateral plantar fasciitis   Evaluation Date: 2/9/2024  Authorization Period Expiration: 1/29/2025  Plan of Care Expiration: 5/9/2024  Progress Note Due: 4/7/2024  Visit # / Visits authorized: 6/20 (+ evaluation)  FOTO: 1/3 (last performed on 2/9/2024)      Precautions: Standard and Diabetes     PTA Visit #: 0/5     Time In: 1305  Time Out: 1335  Total Billable Time: 15 minutes Billing reflects [place] medicaid guidelines, billing all therapy as therapeutic-exercise     Subjective     Patient reports: that she is in severe pain today. She states that she was hurting some before she went to bed last night, but around 2am in the morning, she woke up in severe pain. Patient reports that this is more pain than she typically experiences. She also reports shooting radicular pain into both feet consistently.     She was compliant with home exercise program.  Response to previous treatment: sore after evaluation  Functional change: decreased standing and walking tolerance and pain with raising up on tip toes     Pain: 10/10  across shoulder and down spine   Location: 6/10 bilateral heels     Objective      Objective Measures updated at progress report or POC update only unless otherwise noted.       Treatment     Total Billed TherEx Time: (15) minutes -  Billing reflects [place] medicaid guidelines, billing all therapy as therapeutic-exercise      Yolanda received the treatments listed below:     MANUAL THERAPY TECHNIQUES were  "applied for (5) minutes, including:    Manual Intervention Performed Today    Soft Tissue Mobilization []  []  [x] bilateral  gastrocnemius, soleus, posterior tibialis, anterior tibialis , foot intrinsics   Attempted on lumbar and thoracic paraspinals, periscapular musculature    Joint Mobilizations [] Anterior and posterior mobs     []     []    Functional Dry Needling  []      Plan for Next Visit: Continue as needed         THERAPEUTIC EXERCISES to develop strength, endurance, ROM, flexibility, posture, and core stabilization for (10) minutes including:    Intervention Performed Today    Nustep   6 minutes, level 1   Bike  Attempted but patient requested to stop after 2 minutes   Gastrocnemius stretch   3 x 30 standing on wedge bilateral    Soleus stretch  3 x 30 standing on wedge bilateral    HEP established for low back  See Patient Instructions for details   Abdominal bracing x 3 x 10, 3" holds on and off   Pelvic tilts x Attempted a few repetitions, but her pain in general was too much to move much so activity was stopped and patient decided to hold PT today               Plan for Next Visit:        NEUROMUSCULAR RE-EDUCATION ACTIVITIES to improve Balance, Coordination, Kinesthetic, Sense, Proprioception, and Posture for (0) minutes.  The following were included:    Intervention Performed Today    Toe yoga  3 minutes bilateral alternating    Toe abduction/adduction  3 minutes bilateral LE   Toe flexion/extension  3 minutes bilateral    Ankle circles   X 30 counter clockwise   X 30 clockwise    BAPS Board   2' each position (DF/PF, IV/EV, CW/CCW)   Arch press ups (added)   3 x 10 bilateral    Ankle active range of motion   Yellow band 3 x 10 long sitting with lower extremity elevated bilateral   Dorsiflexion   Plantarflexion  Inversion  Eversion                     Plan for Next Visit:      MHP to low back for 15 minutes.    Patient Education and Home Exercises       Home Exercises Provided and Patient " Education Provided     Education provided: (during session) minutes  PURPOSE: Patient educated on the impairments noted above and the effects of physical therapy intervention to improve overall condition and QOL.   EXERCISE: Patient was educated on all the above exercise prior/during/after for proper posture, positioning, and execution for safe performance with home exercise program.   STRENGTH: Patient educated on the importance of improved core and extremity strength in order to improve alignment of the spine and extremities with static positions and dynamic movement.   3/1/2024: educated on the importance of maintaining core stability with all activties of daily living   3/5/2024: added home exercise program to my chart and gave patient paper copy and gave patient yellow band    Written Home Exercises Provided: yes.  Exercises were reviewed and Yolanda was able to demonstrate them prior to the end of the session.  Yolanda demonstrated good  understanding of the education provided. See EMR under Patient Instructions for exercises provided during therapy sessions.    Assessment     Patient presents to PT with reports of severe complaints of pain. Attempted very gentle manual therapy, but patient reported the pain was too much. Her most severe pain was along bilateral periscapular musculature grossly. Attempted exercises, and patient made it through abdominal bracing but then felt her pain was just too much today to continue. Patient responded well to moist hot back, stating that the heat feels really good, but it did not eliminate pain.     Yolanda is progressing well towards her goals.   Patient prognosis is Guarded.     Patient will continue to benefit from skilled outpatient physical therapy to address the deficits listed in the problem list box on initial evaluation, provide pt/family education and to maximize patient's level of independence in the home and community environment.     Patient's spiritual, cultural and  educational needs considered and pt agreeable to plan of care and goals.       Anticipated Barriers for therapy: co-morbidities      Goals:   Short Term Goals:  6 weeks Status  Date Met   PAIN: Pt will report worst pain of 5/10 in order to progress toward max functional ability and improve quality of life. [x] Progressing  [] Met  [] Not Met     FUNCTION (FOOT): Patient will demonstrate improved function as indicated by a score of greater than or equal to 22 out of 100 on FOTO. [] Progressing  [x] Met  [] Not Met Met     FUNCTION (Lumbar): Patient will demonstrate improved function as indicated by a score of greater than or equal to 21 out of 100 on FOTO. [x] Progressing  [] Met  [] Not Met    STRENGTH: Patient will improve strength to 50% of stated goals, listed in objective measures above, in order to progress towards independence with functional activities. [x] Progressing  [] Met  [] Not Met     POSTURE: Patient will correct postural deviations in sitting and standing, to decrease pain and promote long term stability.  [x] Progressing  [] Met  [] Not Met Partially Met  3/8/2024    GAIT: Patient will demonstrate improved gait mechanics in order to improve functional mobility, improve quality of life, and decrease risk of further injury or fall.  [x] Progressing  [] Met  [] Not Met     HEP: Patient will demonstrate independence with HEP in order to progress toward functional independence. [] Progressing  [x] Met  [] Not Met 3/8/2024       Long Term Goals:  12 weeks Status Date Met   PAIN: Pt will report worst pain of 3/10 in order to progress toward max functional ability and improve quality of life [x] Progressing  [] Met  [] Not Met     FUNCTION (Foot): Patient will demonstrate improved function as indicated by a score of greater than or equal to 34 out of 100 on FOTO. [] Progressing  [x] Met  [] Not Met 3/8/2024    FUNCTION (Lumbar): Patient will demonstrate improved function as indicated by a score of greater  than or equal to 26 out of 100 on FOTO. [x] Progressing  [] Met  [] Not Met    MOBILITY: Patient will improve AROM to stated goals, listed in objective measures above, in order to return to maximal functional potential and improve quality of life.  [x] Progressing  [] Met  [] Not Met     STRENGTH: Patient will improve strength to stated goals, listed in objective measures above, in order to improve functional independence and quality of life.  [x] Progressing  [] Met  [] Not Met     GAIT: Patient will demonstrate normalized gait mechanics with minimal compensation in order to return to PLOF. [x] Progressing  [] Met  [] Not Met     Patient will return to normal ADL's, IADL's, community involvement, recreational activities, and work-related activities with less than or equal to 1/10 pain and maximal function.  [x] Progressing  [] Met  [] Not Met          Plan     Continue Plan of Care (POC) and progress per patient tolerance. See treatment section for details on planned progressions next session.    Updated Certification Period: 3/8/2024 to 6/6/2024  Recommended Treatment Plan: 2 times per week for 12 weeks: to include any combination of the following interventions: virtual visits, dry needling, modalities, electrical stimulation (IFC, Pre-Mod, Attended with Functional Dry Needling), Aquatic Therapy, Cervical/Lumbar Traction, Gait Training, Manual Therapy, Neuromuscular Re-ed, Patient Education, Self Care, Therapeutic Exercise, and Therapeutic Activites        Winifred Blanco, PT

## 2024-03-14 NOTE — TELEPHONE ENCOUNTER
[facility]  Clinical Pharmacy Note    TODAY'S VISIT DATE:  3/14/2024    Reason for visit: Medication adjustment- clinical review      Pertinent History:  Current therapy: Cosentyx      Prior Therapies:  Cosentyx- gradual decrease in efficacy with breakthrough flares,   Enbrel- severe dizziness requiring therapy change,   Humira- injection site reaction requiring therapy change,   Flexeril   Gabapentin   Savella   Low-dose naltrexone   NSAIDs- ibuprofen,meloxicam,  Robaxin   Lyrica- allergy- itching   tizanidine, prednisone- inadequate control of symptoms    All Medical History/Surgical History/Family History/Social History/Allergies/Vaccination history have been reviewed and updated in EMR.    Review of patient's allergies indicates:   Allergen Reactions    Codeine Itching    Hydromorphone Other (See Comments)     Can't wake up for long time if taken  Slow to wake up after surgery after receiving    Aleve [naproxen sodium]      Increases BP    Lyrica [pregabalin] Itching    Motrin [ibuprofen]      Increases BP    Neuromuscular blockers, steroidal Hives     some    Latex, natural rubber Rash    Morphine Rash     itching    Norco [hydrocodone-acetaminophen] Itching, Rash and Hallucinations    Seconal [secobarbital sodium] Rash     itching    Tylox [oxycodone-acetaminophen] Rash     Reviewed all current medications including OTC, herbals and supplements.     Labs:   Lab Results   Component Value Date    HEPBSAG Non-reactive 06/27/2023     Lab Results   Component Value Date    TBGOLDPLUS Negative 06/27/2023     Lab Results   Component Value Date    DVMSQWLP11TQ 24 (L) 02/18/2020    NZGARXJS58 426 03/31/2017     Lab Results   Component Value Date    WBC 6.94 03/11/2024    HGB 11.9 (L) 03/11/2024    HCT 38.6 03/11/2024    MCV 83 03/11/2024     03/11/2024     Lab Results   Component Value Date    CREATININE 0.8 03/11/2024    ALBUMIN 4.2 03/11/2024    BILITOT 0.3 03/11/2024    ALKPHOS 92  03/11/2024    AST 15 03/11/2024    ALT 22 03/11/2024       Assessment/Plan:  Yolanda Betts is a 53 y.o. female that  has a past medical history of Abnormal Pap smear of cervix, Anemia, Anxiety, Arthritis, Asthma, Bipolar 1 disorder, Diabetes mellitus, type 2, Dyslipidemia associated with type 2 diabetes mellitus, General anesthetics causing adverse effect in therapeutic use, Genital warts, GERD (gastroesophageal reflux disease), Herpes simplex virus (HSV) infection, Hyperlipidemia, Hypertension, Hypertension complicating diabetes, Migraine with aura and without status migrainosus, not intractable, Mild persistent asthma without complication, Morbid obesity with body mass index (BMI) of 45.0 to 49.9 in adult, Obstructive sleep apnea, ALEN (obstructive sleep apnea), Schizoaffective disorder, bipolar type, Seasonal allergic rhinitis due to pollen, Type 2 diabetes mellitus with hyperglycemia, with long-term current use of insulin, and Type 2 diabetes mellitus with hyperglycemia, without long-term current use of insulin.    Recommendations:    1. Taltz- preferred side effect profile and had success with Cosentyx. Slight concern for antibody development.   2. Rinvoq/Xeljanz- viable option but some concerns for cardiovascular risk in patient with previous history of HTN/hyperlipidemia.   No hx of DVT/PE nor STEMI/NSTEMI. Maternal  history of blood clots - maybe post-op?   3. TNFi- patient has already failed 2 meds in this class, increased risk of further treatment failure in this class.        Tierra Galan, PharmD  Rheumatology Clinical Pharmacist

## 2024-03-15 ENCOUNTER — CLINICAL SUPPORT (OUTPATIENT)
Dept: REHABILITATION | Facility: HOSPITAL | Age: 54
End: 2024-03-15
Payer: MEDICAID

## 2024-03-15 DIAGNOSIS — R29.898 DECREASED GRIP STRENGTH: Primary | ICD-10-CM

## 2024-03-15 DIAGNOSIS — R52 PAIN: ICD-10-CM

## 2024-03-15 DIAGNOSIS — Z78.9 DECREASED ACTIVITIES OF DAILY LIVING (ADL): ICD-10-CM

## 2024-03-15 PROCEDURE — 97530 THERAPEUTIC ACTIVITIES: CPT

## 2024-03-15 NOTE — PROGRESS NOTES
[facility]  Occupational Therapy Treatment Note     Date: 3/15/2024  Name: Yolanda Betts  Clinic Number: [id number]    Therapy Diagnosis:   Encounter Diagnoses   Name Primary?    Decreased  strength Yes    Decreased activities of daily living (ADL)     Pain      Physician: Neville Roth,*    Physician Orders: Occupational Therapy to Evaluate and Treat  Medical Diagnosis:   G56.03 (ICD-10-CM) - Bilateral carpal tunnel syndrome   Z98.890 (ICD-10-CM) - S/p bilateral carpal tunnel release      Surgical Procedure and Date: Bilateral carpal tunnel release, 12/18/2023     Evaluation Date: 3/5/2024  Insurance Authorization Period Expiration: 2/27/2023 - 12/31/2024  Plan of Care Certification Period: 3/5/2024 - 6/5/2024  Progress Note Due: 4/5/2024      Date of Return to MD: N/A  Visit # / Visits authorized: 2 / 24  FOTO: 1/3     Precautions:  Standard     Time In: 8:30  Time Out: 9:30  Total Appointment Time (timed & untimed codes): 60 minutes    (billing reflects skilled 1:1 time)      Subjective     Patient reports:no pain in her hands, just weakness   She was compliant with home exercise program given last session.   Response to previous treatment: No adverse reactions noted or reported   Functional change: None reported at first treatment after initial evaluation     Pain: 0-1/10  Location: bilateral wrists      Objective     Objective Measures updated at progress report unless specified.    Treatment     Yolanda received the treatments listed below:      Direct contact modalities after being cleared for contraindications: 3.0 MHz, .8  W/cm2, continuous, 8 min (16 min) total) to B wrist/hand surgical site, to decrease pain & edema, increase circulation and tissue extensibility.  Heat pack applied to opposite hand during ultrasound to decrease pain and increase tissue extensibility    Manual therapy techniques: Myofacial release, Manual Lymphatic Drainage, Soft tissue  Mobilization, and scar management were applied to the: B forearm/wrist/hands forearm/wrist/hand for 20 minutes, including:   use of hand techniques, cupping and IASTM tools to increase blood flow/circulation, improve soft tissue pliability and decrease pain.     Therapeutic exercises to develop strength, endurance, and ROM for 24 minutes, including:  EXERCISES: B HANDS   AROM:    -Wrist F/E/RD/UD 10 reps each   -Thumb/pinky slides 10 reps    Dexterciser 3 min   Isospheres 3 min       Strengthening:    Wrist 3 ways over wedge 10 reps each, 2# wt   PHG 15 reps, setting 2 (22#)   Digi-flex 15 reps, red   Resistive Clothespin w/ tripod & lateral pinch 10 reps each pinch       Add Next Visit:    T-putty  Yellow/red   -Molding 2 min   -Grasp Manipulation 3 reps, 5 sec hold   -Pancake & Extension Blooms 3 reps   -Log Rolls w/ tripod & lateral pinch 1 log each          Patient Education and Home Exercises     Education provided:   - Reviewed HEP  - Progress towards goals     Written Home Exercises Provided: Patient instructed to cont prior HEP.  Exercises were reviewed and Yolanda was able to demonstrate them prior to the end of the session.  Yolanda demonstrated good  understanding of the home exercise program provided. See electronic medical record under Patient Instructions for exercises provided during therapy sessions.       Assessment     Pain in trace to none in B wrist/hands.  Scar tissue is moderate to minimally dense in the R hand and minimally dense in the L hand at the surgical sites.  Patient was able to perform all above listed therapeutic exercises without increased pain or difficulty today.     Yolanda is progressing well towards her goals and there are no updates to goals at this time. Pt prognosis is Good.     Patient will continue to benefit from skilled outpatient occupational therapy to address the deficits listed in the problem list on initial evaluation provide patient/family education and to maximize  patient's level of independence in the home and community environment.     Patient's spiritual, cultural and educational needs considered and patient agreeable to plan of care and goals.    Anticipated barriers to occupational therapy: comorbid diagnosis, compliance with HEP and attendance to therapy as recommended     Goals:  Short Term Goals:  6 weeks  Progress       Pain: Patient will demonstrate improved pain by reports of less than or equal to 3/10 worst pain on the verbal rating scale in order to progress toward maximal functional ability and improve quality of life. PC   Function: Patient will demonstrate improved function as indicated by a functional intake score of more than or equal to 40 out of 100 on FOTO. PC   Mobility: Patient will improve AROM to 50% of stated goals, listed in objective measures above, in order to progress towards independence with functional activities.  PC   Strength: Patient will improve strength to 50% of stated goals, listed in objective measures above, in order to progress towards independence with functional activities.  PC   HEP: Patient will demonstrate independence with HEP in order to progress toward functional independence. PC      Long Term Goals:  12 weeks  Progress      Pain: Patient will demonstrate improved pain by reports of less than or equal to 2/10 worst pain on the verbal rating scale in order to progress toward maximal functional ability and improve quality of life.   PC   Function: Patient will demonstrate improved function as indicated by a functional intake score of more than or equal to 52 out of 100 on FOTO. PC   Mobility: Patient will improve AROM to stated goals, listed in objective measures above, in order to return to maximal functional potential and improve quality of life. PC   Strength: Patient will improve strength to stated goals, listed in objective measures above, in order to improve functional independence and quality of life. PC   Patient will  return to normal ADL's, IADL's, community involvement, recreational activities, and work-related activities with less than or equal to 1/10 pain and maximal function.  PC      Goals Key:  PC= Progressing/Continue;       PM= Partially Met;       Goal Met= Goal Met      DC= Discontinue     Plan     Plan of Care Certification: 3/5/2024 to 6/5/2024.      Outpatient Occupational Therapy 1-2 times weekly for 12 weeks to include the following interventions:  Therapeutic Exercise, Functional Activities, Patient Education, Home Exercise Program, ADL Training, Transfer/Mobility Training, Manual Therapy, Neuromuscular Reeducation/Sensory, Electrical Stimulation/TENS/Interferential, Moist Heat/Ice/Paraffin, Cognitive Preceptual Retraining, Orthotic Fabrication/Fit/Management, Ultrasound/Phonophoresis, and Edema Control/Fluidotherapy.  Updates/Grading for next session: progress ROM, as tolerated    Torrey Huston OT   3/15/2024

## 2024-03-18 ENCOUNTER — OFFICE VISIT (OUTPATIENT)
Dept: GASTROENTEROLOGY | Facility: CLINIC | Age: 54
End: 2024-03-18
Payer: MEDICAID

## 2024-03-18 ENCOUNTER — PATIENT MESSAGE (OUTPATIENT)
Dept: GASTROENTEROLOGY | Facility: CLINIC | Age: 54
End: 2024-03-18

## 2024-03-18 ENCOUNTER — TELEPHONE (OUTPATIENT)
Dept: DIABETES | Facility: CLINIC | Age: 54
End: 2024-03-18
Payer: MEDICAID

## 2024-03-18 VITALS
SYSTOLIC BLOOD PRESSURE: 130 MMHG | HEIGHT: 56 IN | DIASTOLIC BLOOD PRESSURE: 85 MMHG | BODY MASS INDEX: 39.23 KG/M2 | WEIGHT: 174.38 LBS | HEART RATE: 76 BPM

## 2024-03-18 DIAGNOSIS — Z86.010 PERSONAL HISTORY OF COLONIC POLYPS: ICD-10-CM

## 2024-03-18 DIAGNOSIS — R14.0 BLOATING: ICD-10-CM

## 2024-03-18 DIAGNOSIS — K75.81 NASH (NONALCOHOLIC STEATOHEPATITIS): Primary | ICD-10-CM

## 2024-03-18 DIAGNOSIS — K63.9 TYPE 2 DIABETES MELLITUS WITH DIABETIC ENTEROPATHY: ICD-10-CM

## 2024-03-18 DIAGNOSIS — E66.01 MORBID OBESITY WITH BMI OF 40.0-44.9, ADULT: ICD-10-CM

## 2024-03-18 DIAGNOSIS — K58.2 IRRITABLE BOWEL SYNDROME WITH BOTH CONSTIPATION AND DIARRHEA: ICD-10-CM

## 2024-03-18 DIAGNOSIS — E11.69 TYPE 2 DIABETES MELLITUS WITH DIABETIC ENTEROPATHY: ICD-10-CM

## 2024-03-18 PROCEDURE — 1159F MED LIST DOCD IN RCRD: CPT | Mod: CPTII,,, | Performed by: INTERNAL MEDICINE

## 2024-03-18 PROCEDURE — 4010F ACE/ARB THERAPY RXD/TAKEN: CPT | Mod: CPTII,,, | Performed by: INTERNAL MEDICINE

## 2024-03-18 PROCEDURE — 99214 OFFICE O/P EST MOD 30 MIN: CPT | Mod: S$PBB,,, | Performed by: INTERNAL MEDICINE

## 2024-03-18 PROCEDURE — 99999 PR PBB SHADOW E&M-EST. PATIENT-LVL V: CPT | Mod: PBBFAC,,, | Performed by: INTERNAL MEDICINE

## 2024-03-18 PROCEDURE — 99215 OFFICE O/P EST HI 40 MIN: CPT | Mod: PBBFAC | Performed by: INTERNAL MEDICINE

## 2024-03-18 PROCEDURE — 3075F SYST BP GE 130 - 139MM HG: CPT | Mod: CPTII,,, | Performed by: INTERNAL MEDICINE

## 2024-03-18 PROCEDURE — 3008F BODY MASS INDEX DOCD: CPT | Mod: CPTII,,, | Performed by: INTERNAL MEDICINE

## 2024-03-18 PROCEDURE — 1160F RVW MEDS BY RX/DR IN RCRD: CPT | Mod: CPTII,,, | Performed by: INTERNAL MEDICINE

## 2024-03-18 PROCEDURE — 3079F DIAST BP 80-89 MM HG: CPT | Mod: CPTII,,, | Performed by: INTERNAL MEDICINE

## 2024-03-18 RX ORDER — POLYETHYLENE GLYCOL 3350 17 G/17G
17 POWDER, FOR SOLUTION ORAL DAILY
Qty: 1530 G | Refills: 3 | Status: SHIPPED | OUTPATIENT
Start: 2024-03-18

## 2024-03-18 NOTE — PROGRESS NOTES
[facility]  Gastroenterology    Patient evaluated at the request of Sasha Sorenson MD  [address]  [address]  [place] [address]    PCP: Sasha Sorenson MD    3/18/24      Subjective:     Patient comes to see us with different issues:  DAVIS/Fatty Liver. We discussed that the most important thing to do is lifestyle changes. I will refer her to hepatology since there is a new drug coming out to treat liver fibrosis and Metabolic associated liver disease. Nonalcoholic fatty liver, a disease tightly linked to the obesity crisis, is a strong risk factor for heart disease and Type 2 diabetes, and in severe cases it can lead to liver failure. There are no official dietary guidelines to treat it. But whether certain foods might fuel the disease is a subject that is drawing increasing attention from researchers.  Researchers have questioned the involvement of a number of dietary factors -- trans fats, omega-6 oils, fried foods and fructose, to name a few. But one that has attracted perhaps the most attention is sugar, in part because it is metabolized in the liver and it is known to increase blood levels of triglycerides, a type of fat. Studies suggest that sugar consumption contributes to liver fat accumulation. And there is some data indicating that people who carry genetic variants associated with fatty liver are particularly sensitive to increased fat accumulation in response to sugar and refined carbohydrates. One of the first pieces of dietary advice that we, clinicians who treat fatty liver give to their patients, is to eliminate sugary drinks from their diets. But some studies suggest that patients with the disease are typically consuming too many calories of all kinds, not just sugar. We often recommend to avoid eating heavily processed foods, which are easy to consume in large quantities and usually stripped of their fiber and other naturally occurring nutrients. Preliminary  studies have found so far that fatty liver patients respond well to the Mediterranean diet, which includes plenty of fresh produce, nuts, olive oil, poultry and fish. Vitamin E is recommended.   Body mass index is 39.1 kg/m². Refer to Diabetic clinic and [facility]estyle [facility].   IBS: alternating diarrhea and constipation. Emphasized on the importance of healthy diet, increased fiber and exercise. I also provided a Rx for Miralax.   Colon cancer screening and prevention: patient had a sub optimal exam last year due to poor prep.   Diabetic enteropathy: Diabetic teaching by our team of specialists.     General weight loss/lifestyle modification strategies discussed (elicit support from others; identify saboteurs; non-food rewards, etc).  Diet interventions: diet diary indefinitely, qualitative changes (increase low-fat,  high-fiber foods), referral to dietitian for guidance in these changes, and vegan diet.  Informal exercise measures discussed, e.g. taking stairs instead of elevator.       Past Medical History:   Diagnosis Date    Abnormal Pap smear of cervix     HPV genital warts    Anemia     Anxiety     Arthritis     Asthma 10/11/2016    Bipolar 1 disorder     Diabetes mellitus, type 2     Dyslipidemia associated with type 2 diabetes mellitus 05/13/2019    General anesthetics causing adverse effect in therapeutic use     Genital warts     GERD (gastroesophageal reflux disease)     Herpes simplex virus (HSV) infection     Hyperlipidemia     Hypertension     Hypertension complicating diabetes 05/05/2019    Migraine with aura and without status migrainosus, not intractable 03/21/2016    Mild persistent asthma without complication 10/11/2016    Morbid obesity with body mass index (BMI) of 45.0 to 49.9 in adult 08/03/2017    Obstructive sleep apnea     ALEN (obstructive sleep apnea)     2L per N/C q HS    Schizoaffective disorder, bipolar type 04/25/2019    Seasonal allergic rhinitis due to pollen 05/13/2019     Type 2 diabetes mellitus with hyperglycemia, with long-term current use of insulin 2017    Type 2 diabetes mellitus with hyperglycemia, without long-term current use of insulin 2017       Past Surgical History:   Procedure Laterality Date    abcess removed right back      BELT ABDOMINOPLASTY  1996    BREAST SURGERY Bilateral     Reduction    CARPAL TUNNEL RELEASE Bilateral 2023    Procedure: RELEASE, CARPAL TUNNEL;  Surgeon: Neville Roth MD;  Location: [facility];  Service: Orthopedics;  Laterality: Bilateral;  bilateral carpal tunnel release     SECTION      X 2    COLONOSCOPY      COLONOSCOPY N/A 2018    Procedure: colonoscopy for iron deficiency anemia;  Surgeon: Frankie Sanchez MD;  Location: [facility];  Service: Endoscopy;  Laterality: N/A;    COLONOSCOPY N/A 2018    Procedure: COLONOSCOPY;  Surgeon: Frankie Sanchez MD;  Location: [facility];  Service: Endoscopy;  Laterality: N/A;    COLONOSCOPY N/A 3/10/2023    Procedure: COLONOSCOPY;  Surgeon: Lalita Montes De Oca MD;  Location: [facility];  Service: Endoscopy;  Laterality: N/A;    EPIDURAL STEROID INJECTION INTO CERVICAL SPINE N/A 2023    Procedure: C6/7 IL ROCAEL RN IV Sedation;  Surgeon: Otto Phillips MD;  Location: [facility];  Service: Pain Management;  Laterality: N/A;    EPIDURAL STEROID INJECTION INTO CERVICAL SPINE N/A 2024    Procedure: C5/6 IL ROCAEL;  Surgeon: Otto Phillips MD;  [facility];  Service: Pain Management;  Laterality: N/A;    ESOPHAGOGASTRODUODENOSCOPY N/A 3/10/2023    Procedure: EGD (ESOPHAGOGASTRODUODENOSCOPY);  Surgeon: Lalita Montes De Oca MD;  Location: [facility];  Service: Endoscopy;  Laterality: N/A;    HYSTERECTOMY      w/ BSO; hypermenorrhea    INJECTION OF ANESTHETIC AGENT AROUND MEDIAL BRANCH NERVES INNERVATING CERVICAL FACET JOINT Bilateral 2023    Procedure: Bilateral C5-7 MBB (diagnostic);  Surgeon: Otto Phillips MD;  Location:  [facility];  Service: Pain Management;  Laterality: Bilateral;    MOUTH SURGERY  1996    OOPHORECTOMY      hyst/bso, hypermenorrhea    ROBOT-ASSISTED CHOLECYSTECTOMY USING DA DARI XI N/A 1/3/2020    Procedure: XI ROBOTIC CHOLECYSTECTOMY;  Surgeon: Damir Driscoll MD;  Location: [facility];  Service: General;  Laterality: N/A;    TOTAL REDUCTION MAMMOPLASTY      TUBAL LIGATION         Current Outpatient Medications on File Prior to Visit   Medication Sig Dispense Refill    ALPRAZolam (XANAX) 1 MG tablet Take 1 tablet (1 mg total) by mouth 2 (two) times daily as needed for Anxiety. 60 tablet 2    amlodipine-valsartan (EXFORGE)  mg per tablet Take 1 tablet by mouth once daily. 90 tablet 3    aspirin (ECOTRIN) 81 MG EC tablet Take 1 tablet by mouth daily 30 tablet 0    atorvastatin (LIPITOR) 20 MG tablet Take 1 tablet (20 mg total) by mouth every evening 90 tablet 3    BD INSULIN SYRINGE ULTRA-FINE 1/2 mL 30 gauge x 1/2" Syrg   0    blood-glucose sensor (DEXCOM G6 SENSOR) Mariela 1 each by Misc.(Non-Drug; Combo Route) route every 10 days. 3 each 11    blood-glucose sensor (DEXCOM G7 SENSOR) Mariela Use to check bg. Change every 10 days 3 each 11    blood-glucose transmitter (DEXCOM G6 TRANSMITTER) Mariela 1 each by Misc.(Non-Drug; Combo Route) route every 3 (three) months. 1 each 3    cariprazine (VRAYLAR) 3 mg Cap Take 1 capsule (3 mg total) by mouth every morning. 30 capsule 1    DULoxetine (CYMBALTA) 60 MG capsule Take 1 capsule (60 mg total) by mouth 2 (two) times daily. 180 capsule 3    ergocalciferol (VITAMIN D2) 50,000 unit Cap Take 1 capsule twice weekly for 3 weeks then weekly 12 capsule 3    estradioL (ESTRACE) 0.01 % (0.1 mg/gram) vaginal cream Place 1 g vaginally twice a week. 42.5 g 3    fenofibrate (TRICOR) 145 MG tablet Take 1 tablet (145 mg total) by mouth once daily. 90 tablet 3    fluticasone propionate (FLONASE) 50 mcg/actuation nasal spray 2 sprays (100 mcg total) by Each Nare route once daily. 16 g " "11    frovatriptan (FROVA) 2.5 MG tablet Take 1 tablet at onset of acute migraine. May take 1 more tablet 2 hours later if needed. (MAX 2 TABLET PER DAY. MAX 4 TABLETS PER WEEK.) 9 tablet 1    furosemide (LASIX) 20 MG tablet Take 1 tablet (20 mg total) by mouth once daily. 90 tablet 3    insulin aspart U-100 (NOVOLOG FLEXPEN U-100 INSULIN) 100 unit/mL (3 mL) InPn pen Inject 24 Units into the skin 3 (three) times daily before meals. Use per sliding scale for breakfast and dinner 45 mL 1    insulin aspart U-100 (NOVOLOG U-100 INSULIN ASPART) 100 unit/mL injection Inject 100 Units into the skin Every 3 (three) days. To use via insulin pump 10 mL 5    insulin pump cart,automated,BT (OMNIPOD 5 G6 PODS, GEN 5,) Crtg Inject 1 each into the skin Every 3 (three) days. 30 each 3    insulin syringe-needle U-100 0.3 mL 30 gauge x 5/16" Syrg 1 each by Misc.(Non-Drug; Combo Route) route Every 3 (three) days. 100 each 2    lamoTRIgine (LAMICTAL) 200 MG tablet Take 1 tablet (200 mg total) by mouth 2 (two) times daily. 60 tablet 2    levocetirizine (XYZAL) 5 MG tablet Take 1 tablet (5 mg total) by mouth every evening. 30 tablet 11    lifitegrast (XIIDRA) 5 % Dpet Place 1 drop into both eyes 2 (two) times daily. 60 each 12    magnesium oxide (MAG-OX) 400 mg (241.3 mg magnesium) tablet Take 1 tablet (400 mg total) by mouth once daily. 90 tablet 0    metoprolol succinate (TOPROL-XL) 25 MG 24 hr tablet Take 1 tablet (25 mg total) by mouth once daily. 90 tablet 3    mirtazapine (REMERON) 15 MG tablet Take 1 tablet by mouth at bedtime. 30 tablet 1    omeprazole (PRILOSEC) 40 MG capsule Take 1 capsule (40 mg total) by mouth once daily. 90 capsule 3    pen needle, diabetic (BD ULTRA-FINE MINI PEN NEEDLE) 31 gauge x 3/16" Ndle Use one needle 5 times a day 200 each 11    pen needle, diabetic (BD ULTRA-FINE MINI PEN NEEDLE) 31 gauge x 3/16" Ndle Use 5 a day 200 each 11    secukinumab (COSENTYX PEN, 2 PENS,) 150 mg/mL PnElis Inject 300 mg (2 mL) " into the skin every 28 days. 2 mL 6    spironolactone (ALDACTONE) 25 MG tablet Take 1 tablet (25 mg total) by mouth once daily. 90 tablet 3    tiZANidine (ZANAFLEX) 4 MG tablet Take 2 tablets (8 mg total) by mouth every 6 (six) hours as needed (Muscle spasms). 240 tablet 0    traMADoL (ULTRAM) 50 mg tablet Take 1 tablet (50 mg total) by mouth every 6 (six) hours as needed for pain 28 tablet 0    glucagon (BAQSIMI) 3 mg/actuation Spry 1 spray by Nasal route as needed (hypoglycemia). For emergency use. May repeat 1 time after 15 minutes (Patient not taking: Reported on 3/18/2024) 2 each 1    [DISCONTINUED] clonazePAM (KLONOPIN) 1 MG tablet Take 1 tablet by mouth daily 30 tablet 0    [DISCONTINUED] glucagon, human recombinant, (GLUCAGON EMERGENCY KIT, HUMAN,) 1 mg SolR Inject 1 mg into the muscle as needed. Emergency use 1 each 1    [DISCONTINUED] insulin glargine, TOUJEO, (TOUJEO SOLOSTAR U-300 INSULIN) 300 unit/mL (1.5 mL) InPn pen Inject 16 Units into the skin every evening. 3 pen 1    [DISCONTINUED] lurasidone (LATUDA) 20 mg Tab tablet Take 2 tablets (40 mg total) by mouth once daily for 7 days, THEN 1 tablet (20 mg total) once daily for 7 days. 21 tablet 0    [DISCONTINUED] valsartan (DIOVAN) 320 MG tablet Take 1 tablet (320 mg total) by mouth once daily. 90 tablet 3    [DISCONTINUED] zolpidem (AMBIEN) 10 mg Tab Take 1 tablet (10 mg total) by mouth every evening. 30 tablet 1     Current Facility-Administered Medications on File Prior to Visit   Medication Dose Route Frequency Provider Last Rate Last Admin    ondansetron injection 4 mg  4 mg Intravenous Once PRN Otto Phillips MD           Review of patient's allergies indicates:   Allergen Reactions    Codeine Itching    Hydromorphone Other (See Comments)     Can't wake up for long time if taken  Slow to wake up after surgery after receiving    Aleve [naproxen sodium]      Increases BP    Lyrica [pregabalin] Itching    Motrin [ibuprofen]      Increases BP     Neuromuscular blockers, steroidal Hives     some    Latex, natural rubber Rash    Morphine Rash     itching    Norco [hydrocodone-acetaminophen] Itching, Rash and Hallucinations    Seconal [secobarbital sodium] Rash     itching    Tylox [oxycodone-acetaminophen] Rash       Social History     Socioeconomic History    Marital status: Single   Tobacco Use    Smoking status: Never    Smokeless tobacco: Never   Substance and Sexual Activity    Alcohol use: Yes     Alcohol/week: 0.0 standard drinks of alcohol     Comment: socially  No alcohol 72h prior to sx    Drug use: No    Sexual activity: Yes     Partners: Male     Birth control/protection: Surgical     Comment: hyst   Social History Narrative    Long-term care nurse     Social Determinants of Health     Financial Resource Strain: High Risk (1/18/2024)    Overall Financial Resource Strain (CARDIA)     Difficulty of Paying Living Expenses: Very hard   Food Insecurity: Food Insecurity Present (1/18/2024)    Hunger Vital Sign     Worried About Running Out of Food in the Last Year: Often true     Ran Out of Food in the Last Year: Often true   Transportation Needs: Unmet Transportation Needs (1/18/2024)    PRAPARE - Transportation     Lack of Transportation (Medical): Yes     Lack of Transportation (Non-Medical): Yes   Physical Activity: Inactive (1/18/2024)    Exercise Vital Sign     Days of Exercise per Week: 0 days     Minutes of Exercise per Session: 0 min   Stress: Stress Concern Present (1/18/2024)    [employer] [place] [employer] - Occupational Stress Questionnaire     Feeling of Stress : Very much   Social Connections: Unknown (1/18/2024)    Social Connection and Isolation Panel [NHANES]     Frequency of Communication with Friends and Family: Twice a week     Frequency of Social Gatherings with Friends and Family: Never     Active Member of Clubs or Organizations: No     Attends Club or Organization Meetings: Never     Marital Status:    Recent  "Concern: Social Connections - Socially Isolated (1/18/2024)    Social Connection and Isolation Panel [NHANES]     Frequency of Communication with Friends and Family: Twice a week     Frequency of Social Gatherings with Friends and Family: Never     Attends Denominational Services: More than 4 times per year     Active Member of Clubs or Organizations: No     Attends Club or Organization Meetings: Never     Marital Status:    Housing Stability: High Risk (1/18/2024)    Housing Stability Vital Sign     Unable to Pay for Housing in the Last Year: Yes     Number of Places Lived in the Last Year: 1     Unstable Housing in the Last Year: No       Family History   Problem Relation Age of Onset    Diabetes Mother     Hyperlipidemia Mother     Hypertension Mother     Asthma Mother     COPD Mother     Glaucoma Mother     Thyroid disease Mother     Anesthesia problems Mother         "almost had a cardiac arrest" , blood clots    Hypertension Father     Hyperlipidemia Father     Cancer Father         Brain, lung, liver, kidney    No Known Problems Sister     Hypertension Brother     Alcohol abuse Brother     Heart disease Maternal Grandmother     Hyperlipidemia Maternal Grandmother     Hypertension Maternal Grandmother     Cataracts Maternal Grandmother     Diabetes Maternal Grandmother     Heart disease Maternal Grandfather     Hyperlipidemia Maternal Grandfather     Hypertension Maternal Grandfather     Glaucoma Maternal Grandfather     Cancer Maternal Grandfather     Cataracts Maternal Grandfather     Macular degeneration Maternal Grandfather     Diabetes Maternal Grandfather     Heart disease Paternal Grandmother     Hyperlipidemia Paternal Grandmother     Hypertension Paternal Grandmother     Cataracts Paternal Grandmother     Heart disease Paternal Grandfather     Hyperlipidemia Paternal Grandfather     Hypertension Paternal Grandfather     Cataracts Paternal Grandfather     Breast cancer Maternal Cousin     Breast " cancer Maternal Cousin     Breast cancer Maternal Cousin     Breast cancer Maternal Cousin        Objective:   Vitals:   Vitals:    03/18/24 1416   BP: 130/85   Pulse: 76       Physical Exam  Constitutional:       Appearance: She is well-developed.   HENT:      Head: Normocephalic and atraumatic.   Eyes:      Pupils: Pupils are equal, round, and reactive to light.   Neck:      Thyroid: No thyromegaly.   Cardiovascular:      Rate and Rhythm: Normal rate and regular rhythm.      Heart sounds: Normal heart sounds. No murmur heard.  Pulmonary:      Effort: Pulmonary effort is normal. No respiratory distress.      Breath sounds: Normal breath sounds. No wheezing or rales.   Abdominal:      General: Bowel sounds are normal. There is no distension.      Palpations: Abdomen is soft. There is no mass.      Tenderness: There is no abdominal tenderness.   Musculoskeletal:         General: No tenderness. Normal range of motion.      Cervical back: Normal range of motion and neck supple.   Lymphadenopathy:      Cervical: No cervical adenopathy.   Skin:     General: Skin is warm and dry.      Findings: No erythema.   Neurological:      Mental Status: She is alert and oriented to person, place, and time.      Cranial Nerves: No cranial nerve deficit.      Deep Tendon Reflexes: Reflexes are normal and symmetric.   Psychiatric:         Behavior: Behavior normal.       Lab Results   Component Value Date    WBC 6.94 03/11/2024    HGB 11.9 (L) 03/11/2024    HCT 38.6 03/11/2024    MCV 83 03/11/2024     03/11/2024       BMP  Lab Results   Component Value Date     03/11/2024    K 4.9 03/11/2024     03/11/2024    CO2 27 03/11/2024    BUN 13 03/11/2024    CREATININE 0.8 03/11/2024    CALCIUM 9.8 03/11/2024    ANIONGAP 5 (L) 03/11/2024    EGFRNORACEVR >60 03/11/2024     Lab Results   Component Value Date    ALT 22 03/11/2024    AST 15 03/11/2024    ALKPHOS 92 03/11/2024    BILITOT 0.3 03/11/2024       Corneal Topography -  OU - Both Eyes    Result Date: 2/28/2024  Right Eye Findings include irregular astigmatism. Left Eye Findings include irregular astigmatism.       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 Risks including but not limited to infection, bleeding, heart or respiratory problems and perforation that may require surgery were all explained to the patient. The patient had a chance for questions if there were doubts or concerns about the test. The referring provider will be notified that our consultation is complete and available through the patient's  records.    Thanks for letting us participate in the care of this nice patient,      Clarke Hernandez

## 2024-03-18 NOTE — PROGRESS NOTES
Chronic Pain-Tele-Medicine-Established Note (Follow up visit)  Established Patient - TeleHealth Visit    The patient location is: [place]  The chief complaint leading to consultation is: chronic pain     Visit type: audiovisual    Face to Face time with patient: 10-15 minutes  20 minutes of total time spent on the encounter, which includes face to face time and non-face to face time preparing to see the patient (eg, review of tests), Obtaining and/or reviewing separately obtained history, Documenting clinical information in the electronic or other health record, Independently interpreting results (not separately reported) and communicating results to the patient/family/caregiver, or Care coordination (not separately reported).     Each patient to whom he or she provides medical services by telemedicine is:  (1) informed of the relationship between the physician and patient and the respective role of any other health care provider with respect to management of the patient; and (2) notified that he or she may decline to receive medical services by telemedicine and may withdraw from such care at any time.          Notes:    SUBJECTIVE:  Interval History (3/27/2024):  Patient Yolanda Betts presents today for follow-up visit.  Patient in for follow-up of cervical pain.  She has currently not having any pain radiating into the upper extremities but states at times her pain will radiate down her back.  She rates her pain today an 8/10.  She has been attending physical therapy for 4-5 weeks and is not getting any relief.  She has been attending occupational therapy for hand strength and does report this is getting better.  She has had cervical ROCAEL as well as medial branch block with minimal or short duration of relief in the past.  She is tried multiple medications for her symptoms.  She followed up with Rheumatology this week who DC her tizanidine and started her on Flexeril as well as a Medrol Dosepak for when she has an  acute flare-up.    Interval History (2/26/2024):  Patient Yloanda Betts presents today for follow-up visit.  Since last appt she has had:  1/30/2024 C5/6 IL ROCAEL relief x 1 week, now at baseline.   12/19/2023 Yusef C5-7 MBB no relief.  She still complains of cervical pain which radiates into the bilateral shoulders.  She rates her pain today an 8/10.  She is tried multiple medications in the past with either side effects or no relief including Topamax, gabapentin, Lyrica.  She is currently taking Cymbalta and tizanidine.  She has done physical therapy in the past which has worked really well for her and she is interested in resuming physical therapy program.  No new weakness into the arms or changes in neurological symptoms.    Interval history 12/1/2023  Yolanda Betts presents to tele-medicine appointment for a follow-up appointment for neck pain. Since the last visit, Yolanda Betts states the pain has been worsening. Current pain intensity is 10/10.  She locates the majority of pain to the cervical myofascial region with radiation into her shoulder blade area.  She denies any radiation into the upper extremities.  Previous cervical ROCAEL provided 50% relief for only about 3 weeks.    Interval History (1/17/2023):  Yolanda Betts presents today for follow-up visit.  Patient was last seen on 05/09/2022. At that visit, the plan was to discontinue Savella and Flexeril, continue Cymbalta 60 mg once daily, and begin Tizanidine. She reports she had a slip and fall on Sunday in her tub. Denies any dizziness or LOC before fall, just slipped. Able to get up. Sore all over since then. Patient reports pain as 10/10 today. MRI of cervical spine ordered by Dr. Kirkpatrick, who thinks shoulder pain is stemming more from neck. No injections in shoulder. Pain begins in neck and radiates into both arms and down spine. Reports nothing helps her pain. Requesting referral for medicinal marijuana as she heard that this  may be beneficial.        Interval HPI 05/09/2022  Yolanda Betts is a 52 y.o. female presents today for tele medicine follow-up.  She was recently seen on 03/07/2022.  At that visit, she was provided with a Medrol Dosepak and advised to continue with topical analgesics along with Savella, Cymbalta, and Flexeril p.r.n..  She reports less than 10% relief with this current medication regimen and is having poor sleep.     Interval History (3/7/2022):    Yolanda Betts presents today for follow-up visit.  Patient was last seen about 6 months ago.  She doesn't feel pain is controlled currently, but she does not feel like she can go up on the Savella because she is concerned about drowsiness.  She continues to take the Flexeril twice a day.  She alternates with Voltaren gel and lidocaine cream, which helps somewhat.  She continues to follow-up with orthopedics and had a right shoulder injection on 02/07/2022 with some pain relief.  Patient reports pain as 10/10 today.     Interval History (9/17/2021):  Yolanda Betts presents today on telemedicine visit.  Patient was last seen on 03/23/2021 with Dr. Hay. At that visit, she was on Cymbalta 60 mg twice a day, which she is now only taking once a day.  She is also taking Savella, and she inquires about an increase.  Patient reports pain as 8/10 today.     History of Present Illness:   This patient is a 50 y.o. female who presents today complaining of the above noted pain/s. The patient describes the pain as follows.  Ms. Betts his new patient clinic with complaints of bilateral shoulders, bilateral hips, thoracic and lumbar spine pain.  She has a history of fibromyalgia reports that this pain feels different from the fibromyalgia.  She has been diagnosed with fibromyalgiafor approximately 10 years has been having these joint complaints for approximately last 5 years.  She describes a sharp throbbing sensation which is worse with movement and she has been  unable find anything that provides pain relief.  She has tried numerous different medications including Aleve, Tylenol, Robaxin, tizanidine, Advil, gabapentin, Flexeril, Lyrica, Cymbalta, baclofen in addition to currently participating in physical therapy.  She has had shoulder joint injections in the past which helped for short period of time but denies having had hip injections.  She denies having had surgery on any of these joints.  She reports that anti-inflammatory medications cause her blood pressure to increase therefore she has to avoid them.     Yolanda Betts is a 52 y.o. female  who is presenting with a chief complaint of lumbar back pain . The patient began experiencing this problem insidiously, and the pain has been gradually worsening over the past 5 year(s). The pain is described as throbbing, cramping, aching and heavy and is located in the bilateral lumbar spine . Pain is intermittent and lasts hours. The  pain is nonradiating. The patient rates her pain a 7 out of ten and interferes with activities of daily living a 6 out of ten. Pain is exacerbated by flexion/ extension of the lumbar spine, and is improved by rest. Patient reports prior trauma, no prior spinal surgery      - pertinent negatives: No fever, No chills, No weight loss, No bladder dysfunction, No bowel dysfunction, No saddle anesthesia  - pertinent positives: none    - medications, other therapies tried (physical therapy, injections):     >> NSAIDs, Tylenol, Tramadol, gabapentin, lyrica, zanaflex and flexeril    >> Has previously undergone Physical Therapy      And procedures:   -C6-7 IL ROCAEL on 01/31/2023, 50% relief for 3 weeks   1/30/2024 C5/6 IL ROCAEL relief x 1 week, now at baseline.   12/19/2023 Yusef C5-7 MBB no relief.     Imaging / Labs / Studies (reviewed on 03/27/2024):     MRI cervical spine 01/03/2023  FINDINGS:  The vertebral body heights and alignment are maintained throughout the cervical spine.     No abnormal  vertebral body marrow signal.     Multilevel intervertebral disc desiccation.     No abnormal signal at the cervicomedullary junction or cervical spinal cord.     C2-3: Normal intervertebral disc contour. No central canal or neural foraminal narrowing.     C3-4: Normal intervertebral disc contour.  Bilateral uncovertebral and facet hypertrophy.  No central canal or neural foraminal narrowing.     C4-5: Intervertebral disc bulge.  Bilateral uncovertebral and facet hypertrophy.  No central canal narrowing.  Mild bilateral neural foraminal narrowing.     C5-6: Intervertebral disc bulge.  Bilateral uncovertebral and facet hypertrophy.  Minimal bilateral neural foraminal narrowing.  No central canal narrowing.     C6-7: Intervertebral disc bulge.  No central canal or neural foraminal narrowing.     C7-T1: Normal intervertebral disc contour. No central canal or neural foraminal narrowing.     The paravertebral soft tissues are unremarkable.     Impression:     At C4-5, there is mild intervertebral disc bulge with uncovertebral and facet hypertrophy mildly narrowing the bilateral neural foramina.     At C5-6, there is an intervertebral disc bulge with uncovertebral and facet hypertrophy minimally narrowing the bilateral neural foramina.      11/13/2020 X-Ray Lumbar Spine Ap And Lateral  COMPARISON:  Lumbar spine radiographs August 18, 2020     FINDINGS:  Alignment of the lumbar spine is normal without listhesis.  No fracture.  No definite pars defect.  No suspicious osseous lesion.  Intervertebral disc spaces of the lumbar spine are maintained.  Inferior lumbar spine facet arthropathy is noted.  Sacroiliac joints are unremarkable.  Multiple phleboliths are present within the pelvis.        1/14/21 X-Ray Cervical Spine Complete 5 view  COMPARISON:  August 18, 2020     FINDINGS:  Visualization of C7-T1 level on the lateral view.  Vertebral height and alignment maintained with straightening of the normal curvature.  This can  be seen with positioning as well as spasm and/or strain.  Prevertebral soft tissues, C1-2 articulation and odontoid normal in appearance allowing for positioning.  Pre dens space normal.  Neural foramina at widely patent at all levels bilaterally.     Remaining included osseous structures intact.  Visualized upper lung fields clear.     Impression  Straightening of normal C-spine curvature.  This can be seen with positioning as well as spasm and/or strain.     No acute fracture or subluxation.  Follow-up and or further evaluation as warranted.       PMHx,PSHx, Social history, and Family history:  I have reviewed the patient's medical, surgical, social, and family history in detail and updated the computerized patient record.    Review of patient's allergies indicates:   Allergen Reactions    Codeine Itching    Hydromorphone Other (See Comments)     Can't wake up for long time if taken    Slow to wake up after surgery after receiving    Aleve [naproxen sodium]      Increases BP    Ibuprofen      Increases BP    Neuromuscular blockers, steroidal Hives     some    Pregabalin Itching    Latex, natural rubber Rash    Morphine Rash     itching    Norco [hydrocodone-acetaminophen] Itching, Rash and Hallucinations    Secobarbital sodium Rash     itching    Tylox [oxycodone-acetaminophen] Rash       Current Outpatient Medications   Medication Sig    albuterol (PROVENTIL) 2.5 mg /3 mL (0.083 %) nebulizer solution Take 2.5 mg by nebulization every 4 (four) hours as needed. Times a day    albuterol (PROVENTIL/VENTOLIN HFA) 90 mcg/actuation inhaler Inhale 2 puffs into the lungs every 6 (six) hours as needed for Wheezing.    ALPRAZolam (XANAX) 1 MG tablet Take 1 tablet (1 mg total) by mouth 2 (two) times daily as needed for Anxiety.    amlodipine-valsartan (EXFORGE)  mg per tablet Take 1 tablet by mouth once daily.    amlodipine-valsartan (EXFORGE)  mg per tablet Take 1 tablet by mouth in the morning.    aspirin  "(ECOTRIN) 81 MG EC tablet Take 1 tablet by mouth daily    atorvastatin (LIPITOR) 20 MG tablet Take 1 tablet (20 mg total) by mouth every evening    BD INSULIN SYRINGE ULTRA-FINE 1/2 mL 30 gauge x 1/2" Syrg     blood-glucose sensor (DEXCOM G6 SENSOR) Mariela 1 each by Misc.(Non-Drug; Combo Route) route every 10 days.    blood-glucose sensor (DEXCOM G7 SENSOR) Mariela Use to check bg. Change every 10 days    blood-glucose transmitter (DEXCOM G6 TRANSMITTER) Mariela 1 each by Misc.(Non-Drug; Combo Route) route every 3 (three) months.    cariprazine (VRAYLAR) 1.5 mg Cap Take 1 capsule (1.5 mg total) by mouth once daily. Take with 3 mg cap    [START ON 4/21/2024] cariprazine (VRAYLAR) 4.5 mg Cap Take 1 capsule (4.5 mg total) by mouth once daily.    celecoxib (CELEBREX) 100 MG capsule Take 1 capsule (100 mg total) by mouth 2 (two) times daily.    cyclobenzaprine (FLEXERIL) 10 MG tablet Take 1 tablet (10 mg total) by mouth 3 (three) times daily as needed (Pain).    DULoxetine (CYMBALTA) 60 MG capsule Take 1 capsule (60 mg total) by mouth 2 (two) times daily.    ergocalciferol (VITAMIN D2) 50,000 unit Cap Take 1 capsule twice weekly for 3 weeks then weekly    estradioL (ESTRACE) 0.01 % (0.1 mg/gram) vaginal cream Place 1 g vaginally twice a week.    fenofibrate (TRICOR) 145 MG tablet Take 1 tablet (145 mg total) by mouth once daily.    fluticasone propionate (FLONASE) 50 mcg/actuation nasal spray 2 sprays (100 mcg total) by Each Nare route once daily.    frovatriptan (FROVA) 2.5 MG tablet Take 1 tablet at onset of acute migraine. May take 1 more tablet 2 hours later if needed. (MAX 2 TABLET PER DAY. MAX 4 TABLETS PER WEEK.)    frovatriptan (FROVA) 2.5 MG tablet Take 1 tablet by mouth as needed for Migraine for up to 180 days. If recurs, may repeat after 2 hours. Max of 3 tabs in 24 hours.    furosemide (LASIX) 20 MG tablet Take 1 tablet (20 mg total) by mouth once daily.    glucagon (BAQSIMI) 3 mg/actuation Spry 1 spray by Nasal " "route as needed (hypoglycemia). For emergency use. May repeat 1 time after 15 minutes    insulin aspart U-100 (NOVOLOG FLEXPEN U-100 INSULIN) 100 unit/mL (3 mL) InPn pen Inject 24 Units into the skin 3 (three) times daily before meals. Use per sliding scale for breakfast and dinner    insulin aspart U-100 (NOVOLOG U-100 INSULIN ASPART) 100 unit/mL injection Inject 100 Units into the skin Every 3 (three) days. To use via insulin pump    insulin pump cart,automated,BT (OMNIPOD 5 G6 PODS, GEN 5,) Crtg Inject 1 each into the skin Every 3 (three) days.    insulin syringe-needle U-100 0.3 mL 30 gauge x 5/16" Syrg 1 each by Misc.(Non-Drug; Combo Route) route Every 3 (three) days.    lamoTRIgine (LAMICTAL) 200 MG tablet Take 1 tablet (200 mg total) by mouth 2 (two) times daily.    levocetirizine (XYZAL) 5 MG tablet Take 1 tablet (5 mg total) by mouth every evening.    lifitegrast (XIIDRA) 5 % Dpet Place 1 drop into both eyes 2 (two) times daily.    magnesium oxide (MAG-OX) 400 mg (241.3 mg magnesium) tablet Take 1 tablet (400 mg total) by mouth once daily.    methylPREDNISolone (MEDROL DOSEPACK) 4 mg tablet use as directed    metoprolol succinate (TOPROL-XL) 25 MG 24 hr tablet Take 1 tablet (25 mg total) by mouth once daily.    metoprolol succinate (TOPROL-XL) 25 MG 24 hr tablet Take 1 tablet by mouth in the morning.    mirtazapine (REMERON) 30 MG tablet Take 1 tablet (30 mg total) by mouth every evening.    omeprazole (PRILOSEC) 40 MG capsule Take 1 capsule (40 mg total) by mouth once daily.    pen needle, diabetic (BD ULTRA-FINE MINI PEN NEEDLE) 31 gauge x 3/16" Ndle Use one needle 5 times a day    pen needle, diabetic (BD ULTRA-FINE MINI PEN NEEDLE) 31 gauge x 3/16" Ndle Use 5 a day    polyethylene glycol (GLYCOLAX) 17 gram/dose powder Take 17 g by mouth once daily.    secukinumab (COSENTYX PEN, 2 PENS,) 150 mg/mL PnIj Inject 300 mg (2 mL) into the skin every 28 days.    semaglutide (OZEMPIC) 2 mg/dose (8 mg/3 mL) Gloria " Inject 2 mg into the skin every 7 days.    spironolactone (ALDACTONE) 25 MG tablet Take 1 tablet (25 mg total) by mouth once daily.    spironolactone (ALDACTONE) 25 MG tablet Take 1 tablet by mouth in the morning.    SYMBICORT 160-4.5 mcg/actuation HFAA Inhale 2 puffs into the lungs in the morning and 2 puffs before bedtime. 2 puffs every 12 hours     No current facility-administered medications for this visit.     Facility-Administered Medications Ordered in Other Visits   Medication    ondansetron injection 4 mg       REVIEW OF SYSTEMS:    GENERAL:  No weight loss, malaise or fevers.  HEENT:   No recent changes in vision or hearing  NECK:  Negative for lumps, no difficulty with swallowing.  RESPIRATORY:  Negative for cough, wheezing or shortness of breath, patient denies any recent URI.  CARDIOVASCULAR:  Negative for chest pain, leg swelling or palpitations.  GI:  Negative for abdominal discomfort, blood in stools or black stools or change in bowel habits.  MUSCULOSKELETAL:  See HPI.  SKIN:  Negative for lesions, rash, and itching.  PSYCH:  No mood disorder or recent psychosocial stressors.  Patients sleep is not disturbed secondary to pain.  HEMATOLOGY/LYMPHOLOGY:  Negative for prolonged bleeding, bruising easily or swollen nodes.  Patient is not currently taking any anti-coagulants  NEURO:   No history of headaches, syncope, paralysis, seizures or tremors.  All other reviewed and negative other than HPI.  Telemedicine Exam  There were no vitals filed for this visit.  There is no height or weight on file to calculate BMI.   (reviewed on 3/27/2024)     GENERAL: Well appearing, in no acute distress, alert and oriented x3.  Cooperative.  PSYCH:  Mood and affect appropriate.  SKIN: Skin color & texture with no obvious abnormalities.    HEAD/FACE:  Normocephalic, atraumatic.    PULM:  No difficulty breathing. No nasal flaring. No obvious wheezing.  EXTREMITIES: No obvious deformities. Moving all extremities well,  appears to have symmetric strength throughout.  MUSCULOSKELETAL: No obvious atrophy abnormalities are noted.   NEURO: No obvious neurologic deficit.   GAIT: sitting.     Physical Exam: last in clinic visit:    OBJECTIVE:  Physical exam:  GENERAL: Well appearing, in no acute distress, alert and oriented x3.    PSYCH:  Mood and affect appropriate.  SKIN: Skin color, texture, turgor normal, no rashes or lesions.  HEAD/FACE:  Normocephalic, atraumatic. Cranial nerves grossly intact.  CV:  No peripheral edema noted  PULM:  No difficulty breathing          LABS:  Lab Results   Component Value Date    WBC 6.94 03/11/2024    HGB 11.9 (L) 03/11/2024    HCT 38.6 03/11/2024    MCV 83 03/11/2024     03/11/2024       CMP  Sodium   Date Value Ref Range Status   03/11/2024 139 136 - 145 mmol/L Final     Potassium   Date Value Ref Range Status   03/11/2024 4.9 3.5 - 5.1 mmol/L Final     Chloride   Date Value Ref Range Status   03/11/2024 107 95 - 110 mmol/L Final     CO2   Date Value Ref Range Status   03/11/2024 27 23 - 29 mmol/L Final     Glucose   Date Value Ref Range Status   03/11/2024 94 70 - 110 mg/dL Final     BUN   Date Value Ref Range Status   03/11/2024 13 6 - 20 mg/dL Final     Creatinine   Date Value Ref Range Status   03/11/2024 0.8 0.5 - 1.4 mg/dL Final     Calcium   Date Value Ref Range Status   03/11/2024 9.8 8.7 - 10.5 mg/dL Final     Total Protein   Date Value Ref Range Status   03/11/2024 7.3 6.0 - 8.4 g/dL Final     Albumin   Date Value Ref Range Status   03/11/2024 4.2 3.5 - 5.2 g/dL Final     Total Bilirubin   Date Value Ref Range Status   03/11/2024 0.3 0.1 - 1.0 mg/dL Final     Comment:     For infants and newborns, interpretation of results should be based  on gestational age, weight and in agreement with clinical  observations.    Premature Infant recommended reference ranges:  Up to 24 hours.............<8.0 mg/dL  Up to 48 hours............<12.0 mg/dL  3-5 days..................<15.0 mg/dL  6-29  days.................<15.0 mg/dL       Alkaline Phosphatase   Date Value Ref Range Status   03/11/2024 92 55 - 135 U/L Final     AST   Date Value Ref Range Status   03/11/2024 15 10 - 40 U/L Final     ALT   Date Value Ref Range Status   03/11/2024 22 10 - 44 U/L Final     Anion Gap   Date Value Ref Range Status   03/11/2024 5 (L) 8 - 16 mmol/L Final     eGFR if    Date Value Ref Range Status   03/08/2022 >60.0 >60 mL/min/1.73 m^2 Final     eGFR if non    Date Value Ref Range Status   03/08/2022 >60.0 >60 mL/min/1.73 m^2 Final     Comment:     Calculation used to obtain the estimated glomerular filtration  rate (eGFR) is the CKD-EPI equation.          Lab Results   Component Value Date    HGBA1C 6.5 (H) 11/29/2023             ASSESSMENT: 53 y.o. year old female with neck pain, consistent with     1. Cervical radiculopathy        2. Cervical spondylosis        3. Fibromyalgia                  PLAN:   - Interventional:  None at this time    Medications:  -Continue  Cymbalta 60 mg once daily  -rheumatology Dced tizanidine and changed her to flexeril  - continue diclofenac 1% gel to apply 2g topically up to 4 times per day PRN.   - continue lidocaine 2.5%-prilocaine 2.5% topical cream Q6H PRN topically; can alternate with Voltaren gel, which helps some.  - Patient has failed Gabapentin, Lyrica, topamax.    -Will start celebrex 100mg BID. Do not combine with other NSAIDs such as ibuprofen, aleve, advil. Take with food. If any GI upset, stop medication and contact office.   We have previously discussed potential deleterious side effects of NSAIDs on the cardiovascular, gastrointestinal and renal systems. We have discussed judicious use of this medication.    She will monitor her BP at home daily- she has a machine at home. If averages >140/90 she will stop medication and contact PCP  -Referral previously sent to Dr. Pelon Drew for Medicinal Marijuana     - Rehabilitative: Encouraged  regular exercise. Continue exercises and activities as tolerated.      - Diagnostic: Cervical CT and MRI reviewed     - Follow up:  2-3 months with Dr. Phillips     - This condition does not require this patient to take time off of work, and the primary goal of our Pain Management services is to improve the patient's functional capacity.      - I discussed the risks, benefits, and alternatives to potential treatment options. All questions and concerns were fully addressed today in clinic.     The above plan and management options were discussed at length with patient. Patient is in agreement with the above and verbalized understanding.      Kathy Galan NP  Interventional Pain Management  [facility]    Disclaimer:  This note was prepared using voice recognition system and is likely to have sound alike errors that may have been overlooked even after proof reading.  Please call me with any questions

## 2024-03-18 NOTE — TELEPHONE ENCOUNTER
----- Message from Usha Jewell sent at 3/18/2024 12:50 PM CDT -----  Patient is requesting a call back regarding OZEMPIC and she stated it it urgent. Call back number is [phone]. Thx.EL

## 2024-03-18 NOTE — PATIENT INSTRUCTIONS
03/18/2024    Dear Yolanda Betts,    We are writing to express our heartfelt gratitude for choosing us as your healthcare provider and entrusting us with your well-being. Your health and satisfaction are our top priorities, and we are truly honored to have the opportunity to serve you.    At [facility], we strive to provide the best possible care and support for our patients. My assessment and recommendations are as follows:    DAVIS (nonalcoholic steatohepatitis)  -     Ambulatory referral/consult to Hepatology; Future; Expected date: 03/25/2024    Irritable bowel syndrome with both constipation and diarrhea  -     polyethylene glycol (GLYCOLAX) 17 gram/dose powder; Take 17 g by mouth once daily.  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 We kindly request you to take a few minutes of your time to share your experience with us by posting a Google review. Your honest thoughts and opinions can make a significant difference for others seeking healthcare services and will help us better understand how we can further enhance our patient care.    Your kind words and positive reviews will not only motivate our dedicated team but will also inspire confidence in potential patients who are looking for exceptional healthcare services.    Once again, we extend our sincere appreciation for giving us the opportunity to serve you. If there is anything else we can do to improve your experience or assist you further, please do not hesitate to reach out to us.    Thank you for being part  "of our [facility] family, and we look forward to continuing to provide you with the best care possible.    Thanks for trusting us with your healthcare needs and using [facility]. If you want to ask us a question, you can do so by replying to this message or by calling [phone].    Sincerely,    Clarke Hernandez M.D.    PS: At [facility] we offer virtual visits. Please use your [facility] gill to schedule a virtual visit.     To rate your experience with Dr. Hernandez please click on the link below:    [email or web address]    To post a review, simply follow these quick steps:  1. Visit [employer] or search for my name on Google.  2. Click on the "Write a review" button on the left-hand side of the page.  3. Rate your experience by choosing the number of stars that reflect your satisfaction.  4. Share your thoughts and insights about your visit - we'd love to hear your feedback!    "

## 2024-03-19 ENCOUNTER — HOSPITAL ENCOUNTER (OUTPATIENT)
Dept: PREADMISSION TESTING | Facility: HOSPITAL | Age: 54
Discharge: HOME | End: 2024-03-19
Attending: INTERNAL MEDICINE
Payer: MEDICAID

## 2024-03-19 DIAGNOSIS — Z86.010 PERSONAL HISTORY OF COLONIC POLYPS: Primary | ICD-10-CM

## 2024-03-20 ENCOUNTER — TELEPHONE (OUTPATIENT)
Dept: PSYCHIATRY | Facility: CLINIC | Age: 54
End: 2024-03-20
Payer: MEDICAID

## 2024-03-20 ENCOUNTER — CLINICAL SUPPORT (OUTPATIENT)
Dept: REHABILITATION | Facility: HOSPITAL | Age: 54
End: 2024-03-20
Payer: MEDICAID

## 2024-03-20 DIAGNOSIS — R52 PAIN: ICD-10-CM

## 2024-03-20 DIAGNOSIS — Z78.9 DECREASED ACTIVITIES OF DAILY LIVING (ADL): ICD-10-CM

## 2024-03-20 DIAGNOSIS — R29.898 DECREASED GRIP STRENGTH: Primary | ICD-10-CM

## 2024-03-20 PROCEDURE — 97530 THERAPEUTIC ACTIVITIES: CPT

## 2024-03-20 NOTE — PROGRESS NOTES
"[facility]  Occupational Therapy Treatment Note     Date: 3/20/2024  Name: Yolanda Betts  Clinic Number: [id number]    Therapy Diagnosis:   Encounter Diagnoses   Name Primary?    Decreased  strength Yes    Decreased activities of daily living (ADL)     Pain      Physician: Neville Roth,*    Physician Orders: Occupational Therapy to Evaluate and Treat  Medical Diagnosis:   G56.03 (ICD-10-CM) - Bilateral carpal tunnel syndrome   Z98.890 (ICD-10-CM) - S/p bilateral carpal tunnel release      Surgical Procedure and Date: Bilateral carpal tunnel release, 12/18/2023     Evaluation Date: 3/5/2024  Insurance Authorization Period Expiration: 2/27/2023 - 12/31/2024  Plan of Care Certification Period: 3/5/2024 - 6/5/2024  Progress Note Due: 4/5/2024      Date of Return to MD: N/A  Visit # / Visits authorized: 3 / 24  FOTO: 1/3     Precautions:  Standard     Time In: 2:00  Time Out: 3:00  Total Appointment Time (timed & untimed codes): 60 minutes   (billing reflects skilled 1:1 time)      Subjective     Patient reports:condition is improving. "I'm not dropping as much as I used to"   She was compliant with home exercise program given last session.   Response to previous treatment: less pain and increased    Functional change: None reported at first treatment after initial evaluation     Pain: 0-1/10  Location: bilateral wrists      Objective     Objective Measures updated at progress report unless specified.    Treatment     Yolanda received the treatments listed below:      Modalities performed as follows: Ultrasound first performed on the R wrist/hand according to parameters below, while L wrist/hand received paraffin.     Direct contact modalities after being cleared for contraindications: 3.0 MHz, 1.0 W/cm2, continuous, 10 min to R wrist/hand surgical site, to decrease pain & edema, increase circulation and tissue extensibility.      Supervised modalities after being cleared for " contraindications: Paraffin w/ MHP to L wrist/hand, pre-tx to decrease pain & increase tissue extensibility     Manual therapy techniques: Myofacial release, Manual Lymphatic Drainage, Soft tissue Mobilization, and scar management were applied to the: B forearm/wrist/hands forearm/wrist/hand for 26 minutes, including:   use of hand techniques, cupping and IASTM tools to increase blood flow/circulation, improve soft tissue pliability and decrease pain.     Therapeutic exercises to develop strength, endurance, and ROM for 24 minutes, including:  EXERCISES: B HANDS   AROM:    Dexterciser 3 min   Isospheres 3 min       Strengthening:    PHG 2/10 reps, setting 2 (22#)   Digi-flex 15 reps, red   Resistive Clothespin w/ tripod & lateral pinch 10 reps each pinch, red       Add Next Visit:    T-putty  Yellow/red   -Molding 2 min   -Grasp Manipulation 3 reps, 5 sec hold   -Pancake & Extension Blooms 3 reps   -Log Rolls w/ tripod & lateral pinch 1 log each          Patient Education and Home Exercises     Education provided:   - Reviewed HEP  - Progress towards goals     Written Home Exercises Provided: Patient instructed to cont prior HEP.  Exercises were reviewed and Yolanda was able to demonstrate them prior to the end of the session.  Yolanda demonstrated good  understanding of the home exercise program provided. See electronic medical record under Patient Instructions for exercises provided during therapy sessions.       Assessment     Pain continues to decrease in B wrist/hands.  Scar tissue is minimally dense in B hands at surgical sites.  Patient was able to perform all above listed therapeutic exercises without increased pain or difficulty today.     Yolanda is progressing well towards her goals and there are no updates to goals at this time. Pt prognosis is Good.     Patient will continue to benefit from skilled outpatient occupational therapy to address the deficits listed in the problem list on initial evaluation provide  patient/family education and to maximize patient's level of independence in the home and community environment.     Patient's spiritual, cultural and educational needs considered and patient agreeable to plan of care and goals.    Anticipated barriers to occupational therapy: comorbid diagnosis, compliance with HEP and attendance to therapy as recommended     Goals:  Short Term Goals:  6 weeks  Progress       Pain: Patient will demonstrate improved pain by reports of less than or equal to 3/10 worst pain on the verbal rating scale in order to progress toward maximal functional ability and improve quality of life. PC   Function: Patient will demonstrate improved function as indicated by a functional intake score of more than or equal to 40 out of 100 on FOTO. PC   Mobility: Patient will improve AROM to 50% of stated goals, listed in objective measures above, in order to progress towards independence with functional activities.  PC   Strength: Patient will improve strength to 50% of stated goals, listed in objective measures above, in order to progress towards independence with functional activities.  PC   HEP: Patient will demonstrate independence with HEP in order to progress toward functional independence. PC      Long Term Goals:  12 weeks  Progress      Pain: Patient will demonstrate improved pain by reports of less than or equal to 2/10 worst pain on the verbal rating scale in order to progress toward maximal functional ability and improve quality of life.   PC   Function: Patient will demonstrate improved function as indicated by a functional intake score of more than or equal to 52 out of 100 on FOTO. PC   Mobility: Patient will improve AROM to stated goals, listed in objective measures above, in order to return to maximal functional potential and improve quality of life. PC   Strength: Patient will improve strength to stated goals, listed in objective measures above, in order to improve functional  independence and quality of life. PC   Patient will return to normal ADL's, IADL's, community involvement, recreational activities, and work-related activities with less than or equal to 1/10 pain and maximal function.  PC      Goals Key:  PC= Progressing/Continue;       PM= Partially Met;       Goal Met= Goal Met      DC= Discontinue     Plan     Plan of Care Certification: 3/5/2024 to 6/5/2024.      Outpatient Occupational Therapy 1-2 times weekly for 12 weeks to include the following interventions:  Therapeutic Exercise, Functional Activities, Patient Education, Home Exercise Program, ADL Training, Transfer/Mobility Training, Manual Therapy, Neuromuscular Reeducation/Sensory, Electrical Stimulation/TENS/Interferential, Moist Heat/Ice/Paraffin, Cognitive Preceptual Retraining, Orthotic Fabrication/Fit/Management, Ultrasound/Phonophoresis, and Edema Control/Fluidotherapy.  Updates/Grading for next session: progress ROM, as tolerated    Torrey Huston OT   3/20/2024

## 2024-03-22 ENCOUNTER — CLINICAL SUPPORT (OUTPATIENT)
Dept: REHABILITATION | Facility: HOSPITAL | Age: 54
End: 2024-03-22
Payer: MEDICAID

## 2024-03-22 ENCOUNTER — OFFICE VISIT (OUTPATIENT)
Dept: PSYCHIATRY | Facility: CLINIC | Age: 54
End: 2024-03-22
Payer: MEDICAID

## 2024-03-22 ENCOUNTER — OFFICE VISIT (OUTPATIENT)
Dept: RHEUMATOLOGY | Facility: CLINIC | Age: 54
End: 2024-03-22
Payer: MEDICAID

## 2024-03-22 VITALS
HEIGHT: 56 IN | BODY MASS INDEX: 38.92 KG/M2 | SYSTOLIC BLOOD PRESSURE: 131 MMHG | WEIGHT: 173 LBS | HEART RATE: 78 BPM | DIASTOLIC BLOOD PRESSURE: 82 MMHG

## 2024-03-22 DIAGNOSIS — M45.9 ANKYLOSING SPONDYLITIS, UNSPECIFIED SITE OF SPINE: ICD-10-CM

## 2024-03-22 DIAGNOSIS — F41.1 GENERALIZED ANXIETY DISORDER: Chronic | ICD-10-CM

## 2024-03-22 DIAGNOSIS — Z78.9 DECREASED ACTIVITIES OF DAILY LIVING (ADL): ICD-10-CM

## 2024-03-22 DIAGNOSIS — Z79.899 IMMUNOCOMPROMISED STATE DUE TO DRUG THERAPY: ICD-10-CM

## 2024-03-22 DIAGNOSIS — D84.821 IMMUNOCOMPROMISED STATE DUE TO DRUG THERAPY: ICD-10-CM

## 2024-03-22 DIAGNOSIS — M79.7 FIBROMYALGIA: Primary | ICD-10-CM

## 2024-03-22 DIAGNOSIS — R52 PAIN: ICD-10-CM

## 2024-03-22 DIAGNOSIS — F25.0 SCHIZOAFFECTIVE DISORDER, BIPOLAR TYPE: Primary | Chronic | ICD-10-CM

## 2024-03-22 DIAGNOSIS — R29.898 DECREASED GRIP STRENGTH: Primary | ICD-10-CM

## 2024-03-22 PROCEDURE — 3008F BODY MASS INDEX DOCD: CPT | Mod: CPTII,,, | Performed by: STUDENT IN AN ORGANIZED HEALTH CARE EDUCATION/TRAINING PROGRAM

## 2024-03-22 PROCEDURE — 3075F SYST BP GE 130 - 139MM HG: CPT | Mod: CPTII,,, | Performed by: STUDENT IN AN ORGANIZED HEALTH CARE EDUCATION/TRAINING PROGRAM

## 2024-03-22 PROCEDURE — 99999 PR PBB SHADOW E&M-EST. PATIENT-LVL V: CPT | Mod: PBBFAC,,, | Performed by: STUDENT IN AN ORGANIZED HEALTH CARE EDUCATION/TRAINING PROGRAM

## 2024-03-22 PROCEDURE — 3079F DIAST BP 80-89 MM HG: CPT | Mod: CPTII,,, | Performed by: STUDENT IN AN ORGANIZED HEALTH CARE EDUCATION/TRAINING PROGRAM

## 2024-03-22 PROCEDURE — 1159F MED LIST DOCD IN RCRD: CPT | Mod: CPTII,95,, | Performed by: PSYCHOLOGIST

## 2024-03-22 PROCEDURE — 99215 OFFICE O/P EST HI 40 MIN: CPT | Mod: PBBFAC | Performed by: STUDENT IN AN ORGANIZED HEALTH CARE EDUCATION/TRAINING PROGRAM

## 2024-03-22 PROCEDURE — 4010F ACE/ARB THERAPY RXD/TAKEN: CPT | Mod: CPTII,95,, | Performed by: PSYCHOLOGIST

## 2024-03-22 PROCEDURE — 99215 OFFICE O/P EST HI 40 MIN: CPT | Mod: HP,HB,95, | Performed by: PSYCHOLOGIST

## 2024-03-22 PROCEDURE — 4010F ACE/ARB THERAPY RXD/TAKEN: CPT | Mod: CPTII,,, | Performed by: STUDENT IN AN ORGANIZED HEALTH CARE EDUCATION/TRAINING PROGRAM

## 2024-03-22 PROCEDURE — 99214 OFFICE O/P EST MOD 30 MIN: CPT | Mod: S$PBB,,, | Performed by: STUDENT IN AN ORGANIZED HEALTH CARE EDUCATION/TRAINING PROGRAM

## 2024-03-22 PROCEDURE — 97530 THERAPEUTIC ACTIVITIES: CPT

## 2024-03-22 RX ORDER — CYCLOBENZAPRINE HCL 10 MG
10 TABLET ORAL 3 TIMES DAILY PRN
Qty: 90 TABLET | Refills: 11 | Status: SHIPPED | OUTPATIENT
Start: 2024-03-22

## 2024-03-22 RX ORDER — CARIPRAZINE 1.5 MG/1
1.5 CAPSULE, GELATIN COATED ORAL DAILY
Qty: 30 CAPSULE | Refills: 0 | Status: SHIPPED | OUTPATIENT
Start: 2024-03-22 | End: 2024-04-24

## 2024-03-22 RX ORDER — METHYLPREDNISOLONE 4 MG/1
TABLET ORAL
Qty: 21 TABLET | Refills: 1 | Status: SHIPPED | OUTPATIENT
Start: 2024-03-22 | End: 2024-05-13 | Stop reason: ALTCHOICE

## 2024-03-22 RX ORDER — CARIPRAZINE 4.5 MG/1
4.5 CAPSULE, GELATIN COATED ORAL DAILY
Qty: 30 CAPSULE | Refills: 1 | Status: SHIPPED | OUTPATIENT
Start: 2024-04-21 | End: 2024-05-08 | Stop reason: DRUGHIGH

## 2024-03-22 RX ORDER — MIRTAZAPINE 30 MG/1
30 TABLET, FILM COATED ORAL NIGHTLY
Qty: 30 TABLET | Refills: 1 | Status: SHIPPED | OUTPATIENT
Start: 2024-03-22 | End: 2024-05-21

## 2024-03-22 NOTE — PROGRESS NOTES
"[facility]  Occupational Therapy Treatment Note     Date: 3/22/2024  Name: Yolanda Betts  Clinic Number: [id number]    Therapy Diagnosis:   Encounter Diagnoses   Name Primary?    Decreased  strength Yes    Decreased activities of daily living (ADL)     Pain      Physician: Neville Roth,*    Physician Orders: Occupational Therapy to Evaluate and Treat  Medical Diagnosis:   G56.03 (ICD-10-CM) - Bilateral carpal tunnel syndrome   Z98.890 (ICD-10-CM) - S/p bilateral carpal tunnel release      Surgical Procedure and Date: Bilateral carpal tunnel release, 12/18/2023     Evaluation Date: 3/5/2024  Insurance Authorization Period Expiration: 2/27/2023 - 12/31/2024  Plan of Care Certification Period: 3/5/2024 - 6/5/2024  Progress Note Due: 4/5/2024      Date of Return to MD: N/A  Visit # / Visits authorized: 4 / 24  FOTO: 1/3     Precautions:  Standard     Time In: 7:20  Time Out: 8:15  Total Appointment Time (timed & untimed codes): 55 minutes   (billing reflects skilled 1:1 time)      Subjective     Patient reports:condition is improving. "I'm not dropping as much as I used to"   She was compliant with home exercise program given last session.   Response to previous treatment: less pain and increased    Functional change: None reported at first treatment after initial evaluation     Pain: 0-1/10  Location: bilateral wrists      Objective     Objective Measures updated at progress report unless specified.    Treatment     Yolanda received the treatments listed below:      Modalities performed as follows: Ultrasound first performed on the R wrist/hand according to parameters below, while L wrist/hand received paraffin.     Direct contact modalities after being cleared for contraindications: 3.0 MHz, 1.0 W/cm2, continuous, 10 min to R wrist/hand surgical site, to decrease pain & edema, increase circulation and tissue extensibility.      Supervised modalities after being cleared for " contraindications: Paraffin w/ MHP to L wrist/hand, pre-tx to decrease pain & increase tissue extensibility     Manual therapy techniques: Myofacial release, Manual Lymphatic Drainage, Soft tissue Mobilization, and scar management were applied to the: B forearm/wrist/hands forearm/wrist/hand for 25 minutes, including:   use of hand techniques, cupping and IASTM tools to increase blood flow/circulation, improve soft tissue pliability and decrease pain.     Therapeutic exercises to develop strength, endurance, and ROM for 20 minutes, including:  EXERCISES: B HANDS   AROM:    Dexterciser 3 min   Isospheres 3 min       Strengthening:    PHG 2/10 reps, setting 2 (22#)   Digi-flex 15 reps, red   Resistive Clothespin w/ tripod & lateral pinch 10 reps each pinch, red       Add Next Visit:    T-putty  Yellow/red   -Molding 2 min   -Grasp Manipulation 3 reps, 5 sec hold   -Pancake & Extension Blooms 3 reps   -Log Rolls w/ tripod & lateral pinch 1 log each          Patient Education and Home Exercises     Education provided:   - Reviewed HEP  - Progress towards goals     Written Home Exercises Provided: Patient instructed to cont prior HEP.  Exercises were reviewed and Yolanda was able to demonstrate them prior to the end of the session.  Yolanda demonstrated good  understanding of the home exercise program provided. See electronic medical record under Patient Instructions for exercises provided during therapy sessions.       Assessment     Pain continues to decrease in B wrist/hands.  Scar tissue is minimally dense in B hands at surgical sites.  Patient was able to perform all above listed therapeutic exercises without increased pain or difficulty today.     Yolanda is progressing well towards her goals and there are no updates to goals at this time. Pt prognosis is Good.     Patient will continue to benefit from skilled outpatient occupational therapy to address the deficits listed in the problem list on initial evaluation provide  patient/family education and to maximize patient's level of independence in the home and community environment.     Patient's spiritual, cultural and educational needs considered and patient agreeable to plan of care and goals.    Anticipated barriers to occupational therapy: comorbid diagnosis, compliance with HEP and attendance to therapy as recommended     Goals:  Short Term Goals:  6 weeks  Progress       Pain: Patient will demonstrate improved pain by reports of less than or equal to 3/10 worst pain on the verbal rating scale in order to progress toward maximal functional ability and improve quality of life. PC   Function: Patient will demonstrate improved function as indicated by a functional intake score of more than or equal to 40 out of 100 on FOTO. PC   Mobility: Patient will improve AROM to 50% of stated goals, listed in objective measures above, in order to progress towards independence with functional activities.  PC   Strength: Patient will improve strength to 50% of stated goals, listed in objective measures above, in order to progress towards independence with functional activities.  PC   HEP: Patient will demonstrate independence with HEP in order to progress toward functional independence. PC      Long Term Goals:  12 weeks  Progress      Pain: Patient will demonstrate improved pain by reports of less than or equal to 2/10 worst pain on the verbal rating scale in order to progress toward maximal functional ability and improve quality of life.   PC   Function: Patient will demonstrate improved function as indicated by a functional intake score of more than or equal to 52 out of 100 on FOTO. PC   Mobility: Patient will improve AROM to stated goals, listed in objective measures above, in order to return to maximal functional potential and improve quality of life. PC   Strength: Patient will improve strength to stated goals, listed in objective measures above, in order to improve functional  independence and quality of life. PC   Patient will return to normal ADL's, IADL's, community involvement, recreational activities, and work-related activities with less than or equal to 1/10 pain and maximal function.  PC      Goals Key:  PC= Progressing/Continue;       PM= Partially Met;       Goal Met= Goal Met      DC= Discontinue     Plan     Plan of Care Certification: 3/5/2024 to 6/5/2024.      Outpatient Occupational Therapy 1-2 times weekly for 12 weeks to include the following interventions:  Therapeutic Exercise, Functional Activities, Patient Education, Home Exercise Program, ADL Training, Transfer/Mobility Training, Manual Therapy, Neuromuscular Reeducation/Sensory, Electrical Stimulation/TENS/Interferential, Moist Heat/Ice/Paraffin, Cognitive Preceptual Retraining, Orthotic Fabrication/Fit/Management, Ultrasound/Phonophoresis, and Edema Control/Fluidotherapy.  Updates/Grading for next session: progress ROM, as tolerated    Mary Rosenbaum OT   3/22/2024

## 2024-03-22 NOTE — PROGRESS NOTES
"Outpatient Psychiatry Follow-Up Visit    3/22/2024      Virtual Visit    The patient location is: Patient's home/ Patient reported that his/her location at the time of this visit was in the [facility]     Visit type: Virtual visit with synchronous audio and video     Each patient to whom he or she provides medical services by telehealth is: (1) informed of the relationship between the medical psychologist and patient and the respective role of any other health care provider with respect to management of the patient; and (2) notified that he or she may decline to receive medical services by telehealth and may withdraw from such care at any time.    I also informed patient of the following:   Mariam Young, PhD, MPAP:  [place] medical license number: [id number]    My contact info:  [facility] / 2nd Floor  [address] [place] [address]   Ph: [phone]    If technology issues, call office phone: Ph: [phone] or [phone]  If crisis: Dial 911 or go to nearest Emergency Room (ER)  If questions related to privacy practices: contact [facility] Information Department: [phone]    Chief Complaint:  Yolanda Betts is a 53 y.o. female who presents today for follow-up of mood and anxiety.       Impressions/Plan from last visit: Yolanda attended her visit. She reported that she is still not leaving her house; feeling "more and more depressed." She has been anxious--lives alone. She is in her bed most of the time--stays in the bed, watches TV. She reported that she is not sleeping well at night--only for a few hours sometimes and denied taking naps during the day. She has not been in IOP but has been seeing her therapist weekly. She has been active in her [employer]--is doing that, though she feels overwhelmed with crowds.  We talked about her pushing herself to engage in activities even when anxious; if she needs to remove herself immediately from the " "situation to calm down, she is to return back after she is calmer.  We also discussed movement exercise, including walking.  Encouraged her to make all of her appointments in person so she is forced to get out of the house and walked to her car, etc. She has been on Latuda for an extended period without much improvement.  We agreed to cross titrate to Vraylar and continue to monitor.  She denied any involuntary movements, and none were observed during this visit.  See plan below.    Plan--continue Lamictal 200 mg bid; mirtazepine 15 mg hs; Xanax 1 mg bid; decrease Latuda 40 mg for one week, while starting Vraylar 1.5 mg; then decrease Latuda 20 mg for a week, while increasing Vraylar 3 mg; then stop Latuda and continue Vraylar 3 mg; exercise/movement daily; book club    Interval History and Content of Current Session: Yolanda attended her virtual visit. She said that she has had increased anxiety. She asked for something for "breakthrough anxiety." She said that she feels like someone is sitting on her chest, is shaking, can't concentrate, feels "immobile or paralyzed," feels fearful in "flight or fright mode." She said that it happens all day long. She feels anxious all the time. She has seen cardiology--no underlying physical cause. She is depressed--"I don't know how to get out of it." She feels down and little numb. We discussed grounding--she reported not having ice (no  or trays) and is unable to try ice on the inside of her wrist--also doing something physical to "reset" her system when highly panicked. We had made some medicine changes last visit--we can increase both her mirtazepine and Vraylar. The mirtazepine and Cymbalta combination can raise seratonin--will need to monitor this--but we hope that the increase will help her anxiety. She denied any involuntary movements, and none were observed during this relatively brief virtual visit. She is willing to consider IOP again--needs a higher functioning " group. We will reach out to case management to help with referring to one with her insurance. See plan below.    Plan--continue Lamictal 200 mg bid; Xanax 1 mg bid; increase mirtazepine 30 mg hs; Vraylar 4.5 mg (add 1.5 mg to her 3 mg); takes Cymbalta 60 mg bid from another provider; exercise/movement daily; IOP     since October 2023.        ----------------------------------------------------------------------------------------------------------  Therapist: Feroz Stanley LCSW with [facility]   Ph: [phone]  Fax: [phone]    Prior medicines: Prozac, Wellbutrin, Paxil, Lamictal, Lexapro, Celexa, Cymbalta, Remeron, Abilify, Seroquel (raised blood sugars), Risperdal, Latuda, Restoril, Ambien (groggy), Ambien CR, trazodone, Xanax, clonazepam        3/22/2024     9:44 AM 9/14/2023     1:01 PM 6/12/2023    11:18 AM   GAD7   1. Feeling nervous, anxious, or on edge? 3 3 3   2. Not being able to stop or control worrying? 3 3 3   3. Worrying too much about different things? 3 3 3   4. Trouble relaxing? 3 3 3   5. Being so restless that it is hard to sit still? 3 3 3   6. Becoming easily annoyed or irritable? 3 3 3   7. Feeling afraid as if something awful might happen? 3 3 0   JOANN-7 Score 21 21 18      0-4 = Minimal anxiety  5-9 = Mild anxiety  10-14 = Moderate anxiety  15-21 = Severe anxiety       Review of Systems   PSYCHIATRIC: Pertinant items are noted in the narrative.    Past Medical, Family and Social History: The patient's past medical, family and social history have been reviewed and updated as appropriate within the electronic medical record - see encounter notes.      Current Outpatient Medications:     ALPRAZolam (XANAX) 1 MG tablet, Take 1 tablet (1 mg total) by mouth 2 (two) times daily as needed for Anxiety., Disp: 60 tablet, Rfl: 2    amlodipine-valsartan (EXFORGE)  mg per tablet, Take 1 tablet by mouth once daily., Disp: 90 tablet, Rfl: 3    aspirin (ECOTRIN) 81 MG  "EC tablet, Take 1 tablet by mouth daily, Disp: 30 tablet, Rfl: 0    atorvastatin (LIPITOR) 20 MG tablet, Take 1 tablet (20 mg total) by mouth every evening, Disp: 90 tablet, Rfl: 3    BD INSULIN SYRINGE ULTRA-FINE 1/2 mL 30 gauge x 1/2" Syrg, , Disp: , Rfl: 0    blood-glucose sensor (DEXCOM G6 SENSOR) Mariela, 1 each by Misc.(Non-Drug; Combo Route) route every 10 days., Disp: 3 each, Rfl: 11    blood-glucose sensor (DEXCOM G7 SENSOR) Mariela, Use to check bg. Change every 10 days, Disp: 3 each, Rfl: 11    blood-glucose transmitter (DEXCOM G6 TRANSMITTER) Mariela, 1 each by Misc.(Non-Drug; Combo Route) route every 3 (three) months., Disp: 1 each, Rfl: 3    cariprazine (VRAYLAR) 1.5 mg Cap, Take 1 capsule (1.5 mg total) by mouth once daily. Take with 3 mg cap, Disp: 30 capsule, Rfl: 0    [START ON 4/21/2024] cariprazine (VRAYLAR) 4.5 mg Cap, Take 1 capsule (4.5 mg total) by mouth once daily., Disp: 30 capsule, Rfl: 1    DULoxetine (CYMBALTA) 60 MG capsule, Take 1 capsule (60 mg total) by mouth 2 (two) times daily., Disp: 180 capsule, Rfl: 3    ergocalciferol (VITAMIN D2) 50,000 unit Cap, Take 1 capsule twice weekly for 3 weeks then weekly, Disp: 12 capsule, Rfl: 3    estradioL (ESTRACE) 0.01 % (0.1 mg/gram) vaginal cream, Place 1 g vaginally twice a week., Disp: 42.5 g, Rfl: 3    fenofibrate (TRICOR) 145 MG tablet, Take 1 tablet (145 mg total) by mouth once daily., Disp: 90 tablet, Rfl: 3    fluticasone propionate (FLONASE) 50 mcg/actuation nasal spray, 2 sprays (100 mcg total) by Each Nare route once daily., Disp: 16 g, Rfl: 11    frovatriptan (FROVA) 2.5 MG tablet, Take 1 tablet at onset of acute migraine. May take 1 more tablet 2 hours later if needed. (MAX 2 TABLET PER DAY. MAX 4 TABLETS PER WEEK.), Disp: 9 tablet, Rfl: 1    furosemide (LASIX) 20 MG tablet, Take 1 tablet (20 mg total) by mouth once daily., Disp: 90 tablet, Rfl: 3    glucagon (BAQSIMI) 3 mg/actuation Spry, 1 spray by Nasal route as needed (hypoglycemia). " "For emergency use. May repeat 1 time after 15 minutes (Patient not taking: Reported on 3/18/2024), Disp: 2 each, Rfl: 1    insulin aspart U-100 (NOVOLOG FLEXPEN U-100 INSULIN) 100 unit/mL (3 mL) InPn pen, Inject 24 Units into the skin 3 (three) times daily before meals. Use per sliding scale for breakfast and dinner, Disp: 45 mL, Rfl: 1    insulin aspart U-100 (NOVOLOG U-100 INSULIN ASPART) 100 unit/mL injection, Inject 100 Units into the skin Every 3 (three) days. To use via insulin pump, Disp: 10 mL, Rfl: 5    insulin pump cart,automated,BT (OMNIPOD 5 G6 PODS, GEN 5,) Crtg, Inject 1 each into the skin Every 3 (three) days., Disp: 30 each, Rfl: 3    insulin syringe-needle U-100 0.3 mL 30 gauge x 5/16" Syrg, 1 each by Misc.(Non-Drug; Combo Route) route Every 3 (three) days., Disp: 100 each, Rfl: 2    lamoTRIgine (LAMICTAL) 200 MG tablet, Take 1 tablet (200 mg total) by mouth 2 (two) times daily., Disp: 60 tablet, Rfl: 2    levocetirizine (XYZAL) 5 MG tablet, Take 1 tablet (5 mg total) by mouth every evening., Disp: 30 tablet, Rfl: 11    lifitegrast (XIIDRA) 5 % Dpet, Place 1 drop into both eyes 2 (two) times daily., Disp: 60 each, Rfl: 12    magnesium oxide (MAG-OX) 400 mg (241.3 mg magnesium) tablet, Take 1 tablet (400 mg total) by mouth once daily., Disp: 90 tablet, Rfl: 0    metoprolol succinate (TOPROL-XL) 25 MG 24 hr tablet, Take 1 tablet (25 mg total) by mouth once daily., Disp: 90 tablet, Rfl: 3    mirtazapine (REMERON) 30 MG tablet, Take 1 tablet (30 mg total) by mouth every evening., Disp: 30 tablet, Rfl: 1    omeprazole (PRILOSEC) 40 MG capsule, Take 1 capsule (40 mg total) by mouth once daily., Disp: 90 capsule, Rfl: 3    pen needle, diabetic (BD ULTRA-FINE MINI PEN NEEDLE) 31 gauge x 3/16" Ndle, Use one needle 5 times a day, Disp: 200 each, Rfl: 11    pen needle, diabetic (BD ULTRA-FINE MINI PEN NEEDLE) 31 gauge x 3/16" Ndle, Use 5 a day, Disp: 200 each, Rfl: 11    polyethylene glycol (GLYCOLAX) 17 " "gram/dose powder, Take 17 g by mouth once daily., Disp: 1530 g, Rfl: 3    secukinumab (COSENTYX PEN, 2 PENS,) 150 mg/mL PnIj, Inject 300 mg (2 mL) into the skin every 28 days., Disp: 2 mL, Rfl: 6    spironolactone (ALDACTONE) 25 MG tablet, Take 1 tablet (25 mg total) by mouth once daily., Disp: 90 tablet, Rfl: 3    tiZANidine (ZANAFLEX) 4 MG tablet, Take 2 tablets (8 mg total) by mouth every 6 (six) hours as needed (Muscle spasms)., Disp: 240 tablet, Rfl: 0  No current facility-administered medications for this visit.    Facility-Administered Medications Ordered in Other Visits:     ondansetron injection 4 mg, 4 mg, Intravenous, Once PRN, Otto Phillips MD    Compliance: yes    Side effects: see above    Risk Parameters:  Patient reports no suicidal ideation  Patient reports no homicidal ideation  Patient reports no self-injurious behavior  Patient reports no violent behavior    Exam (detailed: at least 9 elements; comprehensive: all 15 elements)   Constitutional  Vitals:  Most recent vital signs were reviewed.   Last 3 sets of Vitals        2/21/2024     8:46 AM 3/8/2024     8:33 AM 3/18/2024     2:16 PM   Vitals - 1 value per visit   SYSTOLIC 135  130   DIASTOLIC 87  85   Pulse 80  76   Weight (lb) 179.68  174.38   Weight (kg) 81.5  79.1   Height   4' 8" (1.422 m)   BMI (Calculated)   39.1   Pain Score  Zero Eight          General:  age appropriate, casually dressed, neatly groomed, wearing glasses     Musculoskeletal  Muscle Strength/Tone:  no tremor, no tic   Gait & Station:  non-ataxic     Psychiatric  Speech:  no latency; no press, soft   Behavior: wnl   Mood & Affect:  anxious, depressed  blunted   Thought Process:  normal and logical   Associations:  intact   Thought Content:  No change--reports hearing voices; depressed/passive thoughts   Insight:  has awareness of illness   Judgement: behavior is adequate to circumstances   Orientation:  grossly intact   Memory: intact for content of interview " "  Language: grossly intact   Attention Span & Concentration:  Grossly intact   Fund of Knowledge:  intact and appropriate to age and level of education     Assessment and Diagnosis   Status/Progress: Based on the examination today, the patient's problem(s) is/are treatment resistant and failing to respond as expected to treatment.  New problems have not been presented today.   Co-morbidities and psychosocial stressors  are complicating management of the primary condition.  The working differential for this patient includes schizoaffective vs bipolar.     General Impression:     Encounter Diagnoses   Name Primary?    Schizoaffective disorder, bipolar type Yes    Generalized anxiety disorder        Intervention/Counseling/Treatment Plan   Medication Management: Discussed risks, benefits, and alternatives to treatment plan documented above with patient. I answered all patient questions related to this plan, and patient expressed understanding and agreement.   continue Lamictal 200 mg bid; Xanax 1 mg bid; increase mirtazepine 30 mg hs; Vraylar 4.5 mg (add 1.5 mg to her 3 mg)  Continue with therapy    exercise/movement daily  IOP      Medication List with Changes/Refills   New Medications    CARIPRAZINE (VRAYLAR) 4.5 MG CAP    Take 1 capsule (4.5 mg total) by mouth once daily.   Current Medications    ALPRAZOLAM (XANAX) 1 MG TABLET    Take 1 tablet (1 mg total) by mouth 2 (two) times daily as needed for Anxiety.    AMLODIPINE-VALSARTAN (EXFORGE)  MG PER TABLET    Take 1 tablet by mouth once daily.    ASPIRIN (ECOTRIN) 81 MG EC TABLET    Take 1 tablet by mouth daily    ATORVASTATIN (LIPITOR) 20 MG TABLET    Take 1 tablet (20 mg total) by mouth every evening    BD INSULIN SYRINGE ULTRA-FINE 1/2 ML 30 GAUGE X 1/2" SYRG        BLOOD-GLUCOSE SENSOR (DEXCOM G6 SENSOR) NHI    1 each by Misc.(Non-Drug; Combo Route) route every 10 days.    BLOOD-GLUCOSE SENSOR (DEXCOM G7 SENSOR) NHI    Use to check bg. Change every 10 " "days    BLOOD-GLUCOSE TRANSMITTER (DEXCOM G6 TRANSMITTER) NHI    1 each by Misc.(Non-Drug; Combo Route) route every 3 (three) months.    DULOXETINE (CYMBALTA) 60 MG CAPSULE    Take 1 capsule (60 mg total) by mouth 2 (two) times daily.    ERGOCALCIFEROL (VITAMIN D2) 50,000 UNIT CAP    Take 1 capsule twice weekly for 3 weeks then weekly    ESTRADIOL (ESTRACE) 0.01 % (0.1 MG/GRAM) VAGINAL CREAM    Place 1 g vaginally twice a week.    FENOFIBRATE (TRICOR) 145 MG TABLET    Take 1 tablet (145 mg total) by mouth once daily.    FLUTICASONE PROPIONATE (FLONASE) 50 MCG/ACTUATION NASAL SPRAY    2 sprays (100 mcg total) by Each Nare route once daily.    FROVATRIPTAN (FROVA) 2.5 MG TABLET    Take 1 tablet at onset of acute migraine. May take 1 more tablet 2 hours later if needed. (MAX 2 TABLET PER DAY. MAX 4 TABLETS PER WEEK.)    FUROSEMIDE (LASIX) 20 MG TABLET    Take 1 tablet (20 mg total) by mouth once daily.    GLUCAGON (BAQSIMI) 3 MG/ACTUATION SPRY    1 spray by Nasal route as needed (hypoglycemia). For emergency use. May repeat 1 time after 15 minutes    INSULIN ASPART U-100 (NOVOLOG FLEXPEN U-100 INSULIN) 100 UNIT/ML (3 ML) INPN PEN    Inject 24 Units into the skin 3 (three) times daily before meals. Use per sliding scale for breakfast and dinner    INSULIN ASPART U-100 (NOVOLOG U-100 INSULIN ASPART) 100 UNIT/ML INJECTION    Inject 100 Units into the skin Every 3 (three) days. To use via insulin pump    INSULIN PUMP CART,AUTOMATED,BT (OMNIPOD 5 G6 PODS, GEN 5,) CRTG    Inject 1 each into the skin Every 3 (three) days.    INSULIN SYRINGE-NEEDLE U-100 0.3 ML 30 GAUGE X 5/16" SYRG    1 each by Misc.(Non-Drug; Combo Route) route Every 3 (three) days.    LAMOTRIGINE (LAMICTAL) 200 MG TABLET    Take 1 tablet (200 mg total) by mouth 2 (two) times daily.    LEVOCETIRIZINE (XYZAL) 5 MG TABLET    Take 1 tablet (5 mg total) by mouth every evening.    LIFITEGRAST (XIIDRA) 5 % DPET    Place 1 drop into both eyes 2 (two) times daily. " "   MAGNESIUM OXIDE (MAG-OX) 400 MG (241.3 MG MAGNESIUM) TABLET    Take 1 tablet (400 mg total) by mouth once daily.    METOPROLOL SUCCINATE (TOPROL-XL) 25 MG 24 HR TABLET    Take 1 tablet (25 mg total) by mouth once daily.    OMEPRAZOLE (PRILOSEC) 40 MG CAPSULE    Take 1 capsule (40 mg total) by mouth once daily.    PEN NEEDLE, DIABETIC (BD ULTRA-FINE MINI PEN NEEDLE) 31 GAUGE X 3/16" NDLE    Use one needle 5 times a day    PEN NEEDLE, DIABETIC (BD ULTRA-FINE MINI PEN NEEDLE) 31 GAUGE X 3/16" NDLE    Use 5 a day    POLYETHYLENE GLYCOL (GLYCOLAX) 17 GRAM/DOSE POWDER    Take 17 g by mouth once daily.    SECUKINUMAB (COSENTYX PEN, 2 PENS,) 150 MG/ML PNIJ    Inject 300 mg (2 mL) into the skin every 28 days.    SPIRONOLACTONE (ALDACTONE) 25 MG TABLET    Take 1 tablet (25 mg total) by mouth once daily.    TIZANIDINE (ZANAFLEX) 4 MG TABLET    Take 2 tablets (8 mg total) by mouth every 6 (six) hours as needed (Muscle spasms).   Changed and/or Refilled Medications    Modified Medication Previous Medication    CARIPRAZINE (VRAYLAR) 1.5 MG CAP cariprazine (VRAYLAR) 1.5 mg Cap       Take 1 capsule (1.5 mg total) by mouth once daily. Take with 3 mg cap    Take 1 capsule (1.5 mg total) by mouth every morning for 7 days, THEN 2 capsules (3 mg total) every morning for 7 days.    MIRTAZAPINE (REMERON) 30 MG TABLET mirtazapine (REMERON) 15 MG tablet       Take 1 tablet (30 mg total) by mouth every evening.    Take 1 tablet by mouth at bedtime.   Discontinued Medications    CARIPRAZINE (VRAYLAR) 3 MG CAP    Take 1 capsule (3 mg total) by mouth every morning.        Return to Clinic: 6 weeks    Time spent with pt including note preparation and paperwork: 29 minutes       Mariam Young, PhD, MP  Advanced Practice Medical Psychologist  [facility]  [id number] [place] [address].  CHRISTEL Bartlett [id number]  [phone]   [phone] fax                    "

## 2024-03-22 NOTE — PATIENT INSTRUCTIONS

## 2024-03-25 ENCOUNTER — CLINICAL SUPPORT (OUTPATIENT)
Dept: REHABILITATION | Facility: HOSPITAL | Age: 54
End: 2024-03-25
Payer: MEDICAID

## 2024-03-25 DIAGNOSIS — R53.1 DECREASED STRENGTH, ENDURANCE, AND MOBILITY: ICD-10-CM

## 2024-03-25 DIAGNOSIS — R68.89 DECREASED STRENGTH, ENDURANCE, AND MOBILITY: ICD-10-CM

## 2024-03-25 DIAGNOSIS — Z74.09 DECREASED STRENGTH, ENDURANCE, AND MOBILITY: ICD-10-CM

## 2024-03-25 DIAGNOSIS — M25.671 DECREASED RANGE OF MOTION OF BOTH ANKLES: ICD-10-CM

## 2024-03-25 DIAGNOSIS — Z78.9 DECREASED ACTIVITIES OF DAILY LIVING (ADL): ICD-10-CM

## 2024-03-25 DIAGNOSIS — R52 PAIN: ICD-10-CM

## 2024-03-25 DIAGNOSIS — M72.2 PLANTAR FASCIITIS, BILATERAL: Primary | ICD-10-CM

## 2024-03-25 DIAGNOSIS — M25.672 DECREASED RANGE OF MOTION OF BOTH ANKLES: ICD-10-CM

## 2024-03-25 DIAGNOSIS — M79.672 BILATERAL FOOT PAIN: ICD-10-CM

## 2024-03-25 DIAGNOSIS — R29.898 DECREASED GRIP STRENGTH: Primary | ICD-10-CM

## 2024-03-25 DIAGNOSIS — M79.671 BILATERAL FOOT PAIN: ICD-10-CM

## 2024-03-25 PROCEDURE — 97035 APP MDLTY 1+ULTRASOUND EA 15: CPT

## 2024-03-25 PROCEDURE — 97110 THERAPEUTIC EXERCISES: CPT

## 2024-03-25 PROCEDURE — 97140 MANUAL THERAPY 1/> REGIONS: CPT

## 2024-03-25 PROCEDURE — 97530 THERAPEUTIC ACTIVITIES: CPT

## 2024-03-25 NOTE — PROGRESS NOTES
[facility]   Physical Therapy Treatment Note        Name: Yolanda Betts  Clinic Number: [id number]    Therapy Diagnosis:   Encounter Diagnoses   Name Primary?    Plantar fasciitis, bilateral Yes    Decreased strength, endurance, and mobility     Decreased range of motion of both ankles     Bilateral foot pain        Physician: Sasha Sorenson MD, Kathy Galan NP    Visit Date: 3/25/2024    Physician Orders: PT Eval and Treat  Medical Diagnosis from Referral: bilateral plantar fasciitis   Evaluation Date: 2/9/2024  Authorization Period Expiration: 1/29/2025  Plan of Care Expiration: 5/9/2024  Progress Note Due: 4/7/2024  Visit # / Visits authorized: 6/20 (+ evaluation)  FOTO: 1/3 (last performed on 2/9/2024)      Precautions: Standard and Diabetes     PTA Visit #: 0/5     Time In: 1503  Time Out: 1548  Total Billable Time: 42 minutes Billing reflects [place] medicaid guidelines, billing all therapy as therapeutic-exercise     Subjective     Patient reports: that today is a better day for her today. Patient reports that she is always in pain but is having a little less pain today.     She was compliant with home exercise program.  Response to previous treatment: sore after evaluation  Functional change: decreased standing and walking tolerance and pain with raising up on tip toes     Pain: 7-8/10   Location: low back and shoulders    Objective      Objective Measures updated at progress report or POC update only unless otherwise noted.     Treatment     Total Billed TherEx Time: (42) minutes -  Billing reflects [place] medicaid guidelines, billing all therapy as therapeutic-exercise    Yolanda received aquatic therapy with the use of water to decrease the pull of gravity and weightbearing forces on the body to increase tolerance to resisted therex for 42 minutes including the following exercise as well as stepping in and out of the pool with use of upper extremity on handrail  "and step-to pattern:     All performed at lowest   Exercise Performed Today  3/25/2024   Forward/backward walking x 4 minutes   Side-stepping x 4 minutes each side    Treadmill, backwards,  mph   minutes   Marching, alternating x 3 minutes   Hamstring curls, isolated x 2 minutes B   Hip 3-way, B lower extremity x 3 minutes each    Abdominal Bracing x 3 minutes    Un-weighting on noodle   Minutes    Bicycle forward/backward Unable  minutes each    Flutter kick   minutes    Scissor kicks   minutes    Hip flexor Stretch on treadmill   minutes    Quad stretch on step      Hamstring stretch on step   2 x 30"    Gastroc stretch  at wall  2 x 30"         Heel/toe raises    minutes    1/4 squat    minutes    Standing hip circles clockwise/counter clockwise   minutes each    Standing figure 8   10x each         UE alt flexion level 1 paddles  3 Minutes    UE alt abduction  paddles  3 Minutes    UE alt hugs  paddles   Minutes    UE internal rotation/external rotation  paddles   Minutes    UE alt rows level 1 paddles x  Minutes    UE rows bilateral level 1 paddles x 3 Minutes        x = exercise details same as prior session    Yolanda received the treatments listed below:     MANUAL THERAPY TECHNIQUES were applied for (0) minutes, including:    Manual Intervention Performed Today    Soft Tissue Mobilization []  []  [x] bilateral  gastrocnemius, soleus, posterior tibialis, anterior tibialis , foot intrinsics   Attempted on lumbar and thoracic paraspinals, periscapular musculature    Joint Mobilizations [] Anterior and posterior mobs     []     []    Functional Dry Needling  []      Plan for Next Visit: Continue as needed         THERAPEUTIC EXERCISES to develop strength, endurance, ROM, flexibility, posture, and core stabilization for (0) minutes including:    Intervention Performed Today    Nustep   6 minutes, level 1   Bike  Attempted but patient requested to stop after 2 minutes   Gastrocnemius stretch   3 x 30 standing on " "wedge bilateral    Soleus stretch  3 x 30 standing on wedge bilateral    HEP established for low back  See Patient Instructions for details   Abdominal bracing  3 x 10, 3" holds on and off   Pelvic tilts  Attempted a few repetitions, but her pain in general was too much to move much so activity was stopped and patient decided to hold PT today               Plan for Next Visit:        NEUROMUSCULAR RE-EDUCATION ACTIVITIES to improve Balance, Coordination, Kinesthetic, Sense, Proprioception, and Posture for (0) minutes.  The following were included:    Intervention Performed Today    Toe yoga  3 minutes bilateral alternating    Toe abduction/adduction  3 minutes bilateral LE   Toe flexion/extension  3 minutes bilateral    Ankle circles   X 30 counter clockwise   X 30 clockwise    BAPS Board   2' each position (DF/PF, IV/EV, CW/CCW)   Arch press ups (added)   3 x 10 bilateral    Ankle active range of motion   Yellow band 3 x 10 long sitting with lower extremity elevated bilateral   Dorsiflexion   Plantarflexion  Inversion  Eversion                     Plan for Next Visit:      Patient Education and Home Exercises       Home Exercises Provided and Patient Education Provided     Education provided: (during session) minutes  PURPOSE: Patient educated on the impairments noted above and the effects of physical therapy intervention to improve overall condition and QOL.   EXERCISE: Patient was educated on all the above exercise prior/during/after for proper posture, positioning, and execution for safe performance with home exercise program.   STRENGTH: Patient educated on the importance of improved core and extremity strength in order to improve alignment of the spine and extremities with static positions and dynamic movement.   3/1/2024: educated on the importance of maintaining core stability with all activties of daily living   3/5/2024: added home exercise program to my chart and gave patient paper copy and gave patient " yellow band    Written Home Exercises Provided: yes.  Exercises were reviewed and Yolanda was able to demonstrate them prior to the end of the session.  Yolanda demonstrated good  understanding of the education provided. See EMR under Patient Instructions for exercises provided during therapy sessions.    Assessment     Patient tolerated aquatic therapy really well today. Did have to perform all exercises on last step/level of pool due to height of patient. Patient provided cueing for proper abdominal engagement throughout treatment session. Attempted bicycles but patient was unable to coordinate and had increased low back pain so deferred for today. Patient was pleased that she was able to complete full session with relatively no pain.     Yolanda is progressing well towards her goals.   Patient prognosis is Guarded.     Patient will continue to benefit from skilled outpatient physical therapy to address the deficits listed in the problem list box on initial evaluation, provide pt/family education and to maximize patient's level of independence in the home and community environment.     Patient's spiritual, cultural and educational needs considered and pt agreeable to plan of care and goals.       Anticipated Barriers for therapy: co-morbidities      Goals:   Short Term Goals:  6 weeks Status  Date Met   PAIN: Pt will report worst pain of 5/10 in order to progress toward max functional ability and improve quality of life. [x] Progressing  [] Met  [] Not Met     FUNCTION (FOOT): Patient will demonstrate improved function as indicated by a score of greater than or equal to 22 out of 100 on FOTO. [] Progressing  [x] Met  [] Not Met Met     FUNCTION (Lumbar): Patient will demonstrate improved function as indicated by a score of greater than or equal to 21 out of 100 on FOTO. [x] Progressing  [] Met  [] Not Met    STRENGTH: Patient will improve strength to 50% of stated goals, listed in objective measures above, in order to  progress towards independence with functional activities. [x] Progressing  [] Met  [] Not Met     POSTURE: Patient will correct postural deviations in sitting and standing, to decrease pain and promote long term stability.  [x] Progressing  [] Met  [] Not Met Partially Met  3/8/2024    GAIT: Patient will demonstrate improved gait mechanics in order to improve functional mobility, improve quality of life, and decrease risk of further injury or fall.  [x] Progressing  [] Met  [] Not Met     HEP: Patient will demonstrate independence with HEP in order to progress toward functional independence. [] Progressing  [x] Met  [] Not Met 3/8/2024       Long Term Goals:  12 weeks Status Date Met   PAIN: Pt will report worst pain of 3/10 in order to progress toward max functional ability and improve quality of life [x] Progressing  [] Met  [] Not Met     FUNCTION (Foot): Patient will demonstrate improved function as indicated by a score of greater than or equal to 34 out of 100 on FOTO. [] Progressing  [x] Met  [] Not Met 3/8/2024    FUNCTION (Lumbar): Patient will demonstrate improved function as indicated by a score of greater than or equal to 26 out of 100 on FOTO. [x] Progressing  [] Met  [] Not Met    MOBILITY: Patient will improve AROM to stated goals, listed in objective measures above, in order to return to maximal functional potential and improve quality of life.  [x] Progressing  [] Met  [] Not Met     STRENGTH: Patient will improve strength to stated goals, listed in objective measures above, in order to improve functional independence and quality of life.  [x] Progressing  [] Met  [] Not Met     GAIT: Patient will demonstrate normalized gait mechanics with minimal compensation in order to return to PLOF. [x] Progressing  [] Met  [] Not Met     Patient will return to normal ADL's, IADL's, community involvement, recreational activities, and work-related activities with less than or equal to 1/10 pain and maximal  function.  [x] Progressing  [] Met  [] Not Met          Plan     Continue Plan of Care (POC) and progress per patient tolerance. See treatment section for details on planned progressions next session.    Updated Certification Period: 3/8/2024 to 6/6/2024  Recommended Treatment Plan: 2 times per week for 12 weeks: to include any combination of the following interventions: virtual visits, dry needling, modalities, electrical stimulation (IFC, Pre-Mod, Attended with Functional Dry Needling), Aquatic Therapy, Cervical/Lumbar Traction, Gait Training, Manual Therapy, Neuromuscular Re-ed, Patient Education, Self Care, Therapeutic Exercise, and Therapeutic Activites        Starla Ellis, PT, DPT

## 2024-03-25 NOTE — PROGRESS NOTES
RHEUMATOLOGY CLINIC ESTABLISHED PATIENT VISIT    Reason for consult:- fibromyalgia; AS    Chief complaints, HPI, ROS, EXAM, Assessment & Plans:-    Yolanda Betts is a 53 y.o. pleasant female who presents to follow-up from her previous visit with Annia January 11th for fibromyalgia and ankylosing spondylitis.  She did have very significant flare of increased pain earlier this month and came in for a Kenalog injection.  She got very good relief with this she states.  Today she is doing relatively well.  She does continue to have pain primarily in her back and down her spine.  She has been doing physical therapy and we will be doing aquatherapy seen.  No new symptoms otherwise.  Rheumatologic review of systems negative.  Physical exam is unremarkable.    Reviewed all available old and outside pertinent medical records.    All lab results personally reviewed and interpreted by me.    1. Fibromyalgia    2. Ankylosing spondylitis, unspecified site of spine    3. Immunocompromised state due to drug therapy        Problem List Items Addressed This Visit          Orthopedic    Fibromyalgia - Primary (Chronic)    Relevant Medications    cyclobenzaprine (FLEXERIL) 10 MG tablet    Ankylosing spondylitis     Other Visit Diagnoses       Immunocompromised state due to drug therapy                Patient following up today for management of ankylosing spondylitis currently treated with Cosentyx with fairly good relief   Also clearly with chronic pain syndrome  Currently on Cymbalta  Trial Flexeril  Anticipate she may get very good relief from aquatherapy    # Follow up in about 4 months (around 7/22/2024).    Chronic comorbid conditions affecting medical decision making today:    Past Medical History:   Diagnosis Date    Abnormal Pap smear of cervix     HPV genital warts    Anemia     Anxiety     Arthritis     Asthma 10/11/2016    Bipolar 1 disorder     Diabetes mellitus, type 2     Dyslipidemia associated with type 2  diabetes mellitus 2019    General anesthetics causing adverse effect in therapeutic use     Genital warts     GERD (gastroesophageal reflux disease)     Herpes simplex virus (HSV) infection     Hyperlipidemia     Hypertension     Hypertension complicating diabetes 2019    Migraine with aura and without status migrainosus, not intractable 2016    Mild persistent asthma without complication 10/11/2016    Morbid obesity with body mass index (BMI) of 45.0 to 49.9 in adult 2017    Obstructive sleep apnea     ALEN (obstructive sleep apnea)     2L per N/C q HS    Schizoaffective disorder, bipolar type 2019    Seasonal allergic rhinitis due to pollen 2019    Type 2 diabetes mellitus with hyperglycemia, with long-term current use of insulin 2017    Type 2 diabetes mellitus with hyperglycemia, without long-term current use of insulin 2017       Past Surgical History:   Procedure Laterality Date    abcess removed right back      BELT ABDOMINOPLASTY      BREAST SURGERY Bilateral     Reduction    CARPAL TUNNEL RELEASE Bilateral 2023    Procedure: RELEASE, CARPAL TUNNEL;  Surgeon: Neville Roth MD;  Location: [facility];  Service: Orthopedics;  Laterality: Bilateral;  bilateral carpal tunnel release     SECTION      X 2    COLONOSCOPY      COLONOSCOPY N/A 2018    Procedure: colonoscopy for iron deficiency anemia;  Surgeon: Frankie Sanchez MD;  Location: [facility];  Service: Endoscopy;  Laterality: N/A;    COLONOSCOPY N/A 2018    Procedure: COLONOSCOPY;  Surgeon: Frankie Sanchez MD;  Location: [facility];  Service: Endoscopy;  Laterality: N/A;    COLONOSCOPY N/A 3/10/2023    Procedure: COLONOSCOPY;  Surgeon: Lalita Montes De Oca MD;  Location: [facility];  Service: Endoscopy;  Laterality: N/A;    EPIDURAL STEROID INJECTION INTO CERVICAL SPINE N/A 2023    Procedure: C6/7 IL ROCAEL RN IV Sedation;  Surgeon: Otto Phillips MD;  Location:  "[facility];  Service: Pain Management;  Laterality: N/A;    EPIDURAL STEROID INJECTION INTO CERVICAL SPINE N/A 1/30/2024    Procedure: C5/6 IL ROCAEL;  Surgeon: Otto Phillips MD;  Location: [facility];  Service: Pain Management;  Laterality: N/A;    ESOPHAGOGASTRODUODENOSCOPY N/A 3/10/2023    Procedure: EGD (ESOPHAGOGASTRODUODENOSCOPY);  Surgeon: Lalita Montes De Oca MD;  Location: [facility];  Service: Endoscopy;  Laterality: N/A;    HYSTERECTOMY      w/ BSO; hypermenorrhea    INJECTION OF ANESTHETIC AGENT AROUND MEDIAL BRANCH NERVES INNERVATING CERVICAL FACET JOINT Bilateral 12/19/2023    Procedure: Bilateral C5-7 MBB (diagnostic);  Surgeon: Otto Phillips MD;  Location: [facility];  Service: Pain Management;  Laterality: Bilateral;    MOUTH SURGERY  1996    OOPHORECTOMY      hyst/bso, hypermenorrhea    ROBOT-ASSISTED CHOLECYSTECTOMY USING DA DARI XI N/A 1/3/2020    Procedure: XI ROBOTIC CHOLECYSTECTOMY;  Surgeon: Damir Driscoll MD;  Location: [facility] OR;  Service: General;  Laterality: N/A;    TOTAL REDUCTION MAMMOPLASTY      TUBAL LIGATION          Social History     Tobacco Use    Smoking status: Never    Smokeless tobacco: Never   Substance Use Topics    Alcohol use: Yes     Alcohol/week: 0.0 standard drinks of alcohol     Comment: socially  No alcohol 72h prior to sx    Drug use: No       Family History   Problem Relation Age of Onset    Diabetes Mother     Hyperlipidemia Mother     Hypertension Mother     Asthma Mother     COPD Mother     Glaucoma Mother     Thyroid disease Mother     Anesthesia problems Mother         "almost had a cardiac arrest" , blood clots    Hypertension Father     Hyperlipidemia Father     Cancer Father         Brain, lung, liver, kidney    No Known Problems Sister     Hypertension Brother     Alcohol abuse Brother     Heart disease Maternal Grandmother     Hyperlipidemia Maternal Grandmother     Hypertension Maternal Grandmother     Cataracts Maternal Grandmother     " Diabetes Maternal Grandmother     Heart disease Maternal Grandfather     Hyperlipidemia Maternal Grandfather     Hypertension Maternal Grandfather     Glaucoma Maternal Grandfather     Cancer Maternal Grandfather     Cataracts Maternal Grandfather     Macular degeneration Maternal Grandfather     Diabetes Maternal Grandfather     Heart disease Paternal Grandmother     Hyperlipidemia Paternal Grandmother     Hypertension Paternal Grandmother     Cataracts Paternal Grandmother     Heart disease Paternal Grandfather     Hyperlipidemia Paternal Grandfather     Hypertension Paternal Grandfather     Cataracts Paternal Grandfather     Breast cancer Maternal Cousin     Breast cancer Maternal Cousin     Breast cancer Maternal Cousin     Breast cancer Maternal Cousin        Review of patient's allergies indicates:   Allergen Reactions    Codeine Itching    Hydromorphone Other (See Comments)     Can't wake up for long time if taken    Slow to wake up after surgery after receiving    Aleve [naproxen sodium]      Increases BP    Ibuprofen      Increases BP    Neuromuscular blockers, steroidal Hives     some    Pregabalin Itching    Latex, natural rubber Rash    Morphine Rash     itching    Norco [hydrocodone-acetaminophen] Itching, Rash and Hallucinations    Secobarbital sodium Rash     itching    Tylox [oxycodone-acetaminophen] Rash       Medication List with Changes/Refills   New Medications    CYCLOBENZAPRINE (FLEXERIL) 10 MG TABLET    Take 1 tablet (10 mg total) by mouth 3 (three) times daily as needed (Pain).    METHYLPREDNISOLONE (MEDROL DOSEPACK) 4 MG TABLET    use as directed   Current Medications    ALPRAZOLAM (XANAX) 1 MG TABLET    Take 1 tablet (1 mg total) by mouth 2 (two) times daily as needed for Anxiety.    AMLODIPINE-VALSARTAN (EXFORGE)  MG PER TABLET    Take 1 tablet by mouth once daily.    ASPIRIN (ECOTRIN) 81 MG EC TABLET    Take 1 tablet by mouth daily    ATORVASTATIN (LIPITOR) 20 MG TABLET    Take 1  "tablet (20 mg total) by mouth every evening    BD INSULIN SYRINGE ULTRA-FINE 1/2 ML 30 GAUGE X 1/2" SYRG        BLOOD-GLUCOSE SENSOR (DEXCOM G6 SENSOR) NHI    1 each by Misc.(Non-Drug; Combo Route) route every 10 days.    BLOOD-GLUCOSE SENSOR (DEXCOM G7 SENSOR) NHI    Use to check bg. Change every 10 days    BLOOD-GLUCOSE TRANSMITTER (DEXCOM G6 TRANSMITTER) NHI    1 each by Misc.(Non-Drug; Combo Route) route every 3 (three) months.    CARIPRAZINE (VRAYLAR) 1.5 MG CAP    Take 1 capsule (1.5 mg total) by mouth once daily. Take with 3 mg cap    CARIPRAZINE (VRAYLAR) 4.5 MG CAP    Take 1 capsule (4.5 mg total) by mouth once daily.    DULOXETINE (CYMBALTA) 60 MG CAPSULE    Take 1 capsule (60 mg total) by mouth 2 (two) times daily.    ERGOCALCIFEROL (VITAMIN D2) 50,000 UNIT CAP    Take 1 capsule twice weekly for 3 weeks then weekly    ESTRADIOL (ESTRACE) 0.01 % (0.1 MG/GRAM) VAGINAL CREAM    Place 1 g vaginally twice a week.    FENOFIBRATE (TRICOR) 145 MG TABLET    Take 1 tablet (145 mg total) by mouth once daily.    FLUTICASONE PROPIONATE (FLONASE) 50 MCG/ACTUATION NASAL SPRAY    2 sprays (100 mcg total) by Each Nare route once daily.    FROVATRIPTAN (FROVA) 2.5 MG TABLET    Take 1 tablet at onset of acute migraine. May take 1 more tablet 2 hours later if needed. (MAX 2 TABLET PER DAY. MAX 4 TABLETS PER WEEK.)    FUROSEMIDE (LASIX) 20 MG TABLET    Take 1 tablet (20 mg total) by mouth once daily.    GLUCAGON (BAQSIMI) 3 MG/ACTUATION SPRY    1 spray by Nasal route as needed (hypoglycemia). For emergency use. May repeat 1 time after 15 minutes    INSULIN ASPART U-100 (NOVOLOG FLEXPEN U-100 INSULIN) 100 UNIT/ML (3 ML) INPN PEN    Inject 24 Units into the skin 3 (three) times daily before meals. Use per sliding scale for breakfast and dinner    INSULIN ASPART U-100 (NOVOLOG U-100 INSULIN ASPART) 100 UNIT/ML INJECTION    Inject 100 Units into the skin Every 3 (three) days. To use via insulin pump    INSULIN PUMP " "CART,AUTOMATED,BT (OMNIPOD 5 G6 PODS, GEN 5,) CRTG    Inject 1 each into the skin Every 3 (three) days.    INSULIN SYRINGE-NEEDLE U-100 0.3 ML 30 GAUGE X 5/16" SYRG    1 each by Misc.(Non-Drug; Combo Route) route Every 3 (three) days.    LAMOTRIGINE (LAMICTAL) 200 MG TABLET    Take 1 tablet (200 mg total) by mouth 2 (two) times daily.    LEVOCETIRIZINE (XYZAL) 5 MG TABLET    Take 1 tablet (5 mg total) by mouth every evening.    LIFITEGRAST (XIIDRA) 5 % DPET    Place 1 drop into both eyes 2 (two) times daily.    MAGNESIUM OXIDE (MAG-OX) 400 MG (241.3 MG MAGNESIUM) TABLET    Take 1 tablet (400 mg total) by mouth once daily.    METOPROLOL SUCCINATE (TOPROL-XL) 25 MG 24 HR TABLET    Take 1 tablet (25 mg total) by mouth once daily.    MIRTAZAPINE (REMERON) 30 MG TABLET    Take 1 tablet (30 mg total) by mouth every evening.    OMEPRAZOLE (PRILOSEC) 40 MG CAPSULE    Take 1 capsule (40 mg total) by mouth once daily.    PEN NEEDLE, DIABETIC (BD ULTRA-FINE MINI PEN NEEDLE) 31 GAUGE X 3/16" NDLE    Use one needle 5 times a day    PEN NEEDLE, DIABETIC (BD ULTRA-FINE MINI PEN NEEDLE) 31 GAUGE X 3/16" NDLE    Use 5 a day    POLYETHYLENE GLYCOL (GLYCOLAX) 17 GRAM/DOSE POWDER    Take 17 g by mouth once daily.    SECUKINUMAB (COSENTYX PEN, 2 PENS,) 150 MG/ML PNIJ    Inject 300 mg (2 mL) into the skin every 28 days.    SPIRONOLACTONE (ALDACTONE) 25 MG TABLET    Take 1 tablet (25 mg total) by mouth once daily.   Discontinued Medications    TIZANIDINE (ZANAFLEX) 4 MG TABLET    Take 2 tablets (8 mg total) by mouth every 6 (six) hours as needed (Muscle spasms).         Disclaimer: This note was prepared using voice recognition system and is likely to have sound alike errors and is not proofread.  Please message me with any questions.    32 minutes of total time spent on the encounter, which includes face to face time and non-face to face time preparing to see the patient (eg, review of tests), Obtaining and/or reviewing separately " obtained history, Documenting clinical information in the electronic or other health record, Independently interpreting results (not separately reported) and communicating results to the patient/family/caregiver, or Care coordination (not separately reported).     Thank you for allowing me to participate in the care of Yolanda Betts.    Shaq Anderson MD

## 2024-03-25 NOTE — PROGRESS NOTES
"[facility]  Occupational Therapy Treatment Note     Date: 3/25/2024  Name: Yolanda Betts  Clinic Number: [id number]    Therapy Diagnosis:   Encounter Diagnoses   Name Primary?    Decreased  strength Yes    Decreased activities of daily living (ADL)     Pain      Physician: Neville Roth,*    Physician Orders: Occupational Therapy to Evaluate and Treat  Medical Diagnosis:   G56.03 (ICD-10-CM) - Bilateral carpal tunnel syndrome   Z98.890 (ICD-10-CM) - S/p bilateral carpal tunnel release      Surgical Procedure and Date: Bilateral carpal tunnel release, 12/18/2023     Evaluation Date: 3/5/2024  Insurance Authorization Period Expiration: 2/27/2023 - 12/31/2024  Plan of Care Certification Period: 3/5/2024 - 6/5/2024  Progress Note Due: 4/5/2024      Date of Return to MD: N/A  Visit # / Visits authorized: 4 / 24  FOTO: 1/3     Precautions:  Standard     Time In: 1:30  Time Out: 2:25  Total Appointment Time (timed & untimed codes): 55 minutes   (billing reflects skilled 1:1 time)      Subjective     Patient reports:condition is improving. "I'm not dropping as much as I used to"   She was compliant with home exercise program given last session.   Response to previous treatment: less pain and increased    Functional change: None reported at first treatment after initial evaluation     Pain: 0-1/10  Location: bilateral wrists      Objective     Objective Measures updated at progress report unless specified.    Treatment     Yolanda received the treatments listed below:      Modalities performed as follows: Ultrasound first performed on the R wrist/hand according to parameters below, while L wrist/hand received paraffin.     Direct contact modalities after being cleared for contraindications: 3.0 MHz, 1.0 W/cm2, continuous, 10 min to R wrist/hand surgical site, to decrease pain & edema, increase circulation and tissue extensibility.      Supervised modalities after being cleared for " contraindications: Paraffin w/ MHP to L wrist/hand, pre-tx to decrease pain & increase tissue extensibility     Manual therapy techniques: Myofacial release, Manual Lymphatic Drainage, Soft tissue Mobilization, and scar management were applied to the: B forearm/wrist/hands forearm/wrist/hand for 25 minutes, including:   use of hand techniques, cupping and IASTM tools to increase blood flow/circulation, improve soft tissue pliability and decrease pain.     Therapeutic exercises to develop strength, endurance, and ROM for 20 minutes, including:  EXERCISES: B HANDS   AROM:    Dexterciser 3 min   Isospheres 3 min       Strengthening:    PHG 2/10 reps, setting 2 (22#)   Digi-flex 15 reps, red   Resistive Clothespin w/ tripod & lateral pinch 10 reps each pinch, red       Add Next Visit:    T-putty  Yellow/red   -Molding 2 min   -Grasp Manipulation 3 reps, 5 sec hold   -Pancake & Extension Blooms 3 reps   -Log Rolls w/ tripod & lateral pinch 1 log each          Patient Education and Home Exercises     Education provided:   - Reviewed HEP  - Progress towards goals     Written Home Exercises Provided: Patient instructed to cont prior HEP.  Exercises were reviewed and Yolanda was able to demonstrate them prior to the end of the session.  Yolanda demonstrated good  understanding of the home exercise program provided. See electronic medical record under Patient Instructions for exercises provided during therapy sessions.       Assessment     Pain continues to decrease in B wrist/hands.  Scar tissue is minimally dense in B hands at surgical sites.  Patient was able to perform all above listed therapeutic exercises without increased pain or difficulty today.     Yolanda is progressing well towards her goals and there are no updates to goals at this time. Pt prognosis is Good.     Patient will continue to benefit from skilled outpatient occupational therapy to address the deficits listed in the problem list on initial evaluation provide  patient/family education and to maximize patient's level of independence in the home and community environment.     Patient's spiritual, cultural and educational needs considered and patient agreeable to plan of care and goals.    Anticipated barriers to occupational therapy: comorbid diagnosis, compliance with HEP and attendance to therapy as recommended     Goals:  Short Term Goals:  6 weeks  Progress       Pain: Patient will demonstrate improved pain by reports of less than or equal to 3/10 worst pain on the verbal rating scale in order to progress toward maximal functional ability and improve quality of life. PC   Function: Patient will demonstrate improved function as indicated by a functional intake score of more than or equal to 40 out of 100 on FOTO. PC   Mobility: Patient will improve AROM to 50% of stated goals, listed in objective measures above, in order to progress towards independence with functional activities.  PC   Strength: Patient will improve strength to 50% of stated goals, listed in objective measures above, in order to progress towards independence with functional activities.  PC   HEP: Patient will demonstrate independence with HEP in order to progress toward functional independence. PC      Long Term Goals:  12 weeks  Progress      Pain: Patient will demonstrate improved pain by reports of less than or equal to 2/10 worst pain on the verbal rating scale in order to progress toward maximal functional ability and improve quality of life.   PC   Function: Patient will demonstrate improved function as indicated by a functional intake score of more than or equal to 52 out of 100 on FOTO. PC   Mobility: Patient will improve AROM to stated goals, listed in objective measures above, in order to return to maximal functional potential and improve quality of life. PC   Strength: Patient will improve strength to stated goals, listed in objective measures above, in order to improve functional  independence and quality of life. PC   Patient will return to normal ADL's, IADL's, community involvement, recreational activities, and work-related activities with less than or equal to 1/10 pain and maximal function.  PC      Goals Key:  PC= Progressing/Continue;       PM= Partially Met;       Goal Met= Goal Met      DC= Discontinue     Plan     Plan of Care Certification: 3/5/2024 to 6/5/2024.      Outpatient Occupational Therapy 1-2 times weekly for 12 weeks to include the following interventions:  Therapeutic Exercise, Functional Activities, Patient Education, Home Exercise Program, ADL Training, Transfer/Mobility Training, Manual Therapy, Neuromuscular Reeducation/Sensory, Electrical Stimulation/TENS/Interferential, Moist Heat/Ice/Paraffin, Cognitive Preceptual Retraining, Orthotic Fabrication/Fit/Management, Ultrasound/Phonophoresis, and Edema Control/Fluidotherapy.  Updates/Grading for next session: progress ROM, as tolerated    Mary oRsenbaum OT   3/25/2024

## 2024-03-27 ENCOUNTER — TELEPHONE (OUTPATIENT)
Dept: PAIN MEDICINE | Facility: CLINIC | Age: 54
End: 2024-03-27

## 2024-03-27 ENCOUNTER — OFFICE VISIT (OUTPATIENT)
Dept: PAIN MEDICINE | Facility: CLINIC | Age: 54
End: 2024-03-27
Payer: MEDICAID

## 2024-03-27 DIAGNOSIS — M54.12 CERVICAL RADICULOPATHY: Primary | ICD-10-CM

## 2024-03-27 DIAGNOSIS — M79.7 FIBROMYALGIA: ICD-10-CM

## 2024-03-27 DIAGNOSIS — M47.812 CERVICAL SPONDYLOSIS: ICD-10-CM

## 2024-03-27 PROCEDURE — 99214 OFFICE O/P EST MOD 30 MIN: CPT | Mod: 95,,, | Performed by: NURSE PRACTITIONER

## 2024-03-27 PROCEDURE — 4010F ACE/ARB THERAPY RXD/TAKEN: CPT | Mod: CPTII,95,, | Performed by: NURSE PRACTITIONER

## 2024-03-27 RX ORDER — CELECOXIB 100 MG/1
100 CAPSULE ORAL 2 TIMES DAILY
Qty: 60 CAPSULE | Refills: 2 | Status: SHIPPED | OUTPATIENT
Start: 2024-03-27

## 2024-03-27 RX ORDER — SOD SULF/POT CHLORIDE/MAG SULF 1.479 G
12 TABLET ORAL DAILY
Qty: 24 TABLET | Refills: 0 | Status: ON HOLD | OUTPATIENT
Start: 2024-03-27 | End: 2024-04-04 | Stop reason: HOSPADM

## 2024-03-27 NOTE — TELEPHONE ENCOUNTER
Called pt and scheduled an appt with dr. Phillips from today's visit. Pt verbalized understanding.    Alyson TRAN

## 2024-03-27 NOTE — TELEPHONE ENCOUNTER
----- Message from Kathy Galan NP sent at 3/27/2024  7:43 AM CDT -----  Please schedule follow up with Dr. Phillips- next available

## 2024-04-01 ENCOUNTER — CLINICAL SUPPORT (OUTPATIENT)
Dept: REHABILITATION | Facility: HOSPITAL | Age: 54
End: 2024-04-01
Payer: MEDICAID

## 2024-04-01 ENCOUNTER — TELEPHONE (OUTPATIENT)
Dept: DIABETES | Facility: CLINIC | Age: 54
End: 2024-04-01
Payer: MEDICAID

## 2024-04-01 DIAGNOSIS — Z74.09 DECREASED STRENGTH, ENDURANCE, AND MOBILITY: ICD-10-CM

## 2024-04-01 DIAGNOSIS — R52 PAIN: ICD-10-CM

## 2024-04-01 DIAGNOSIS — M25.671 DECREASED RANGE OF MOTION OF BOTH ANKLES: ICD-10-CM

## 2024-04-01 DIAGNOSIS — M72.2 PLANTAR FASCIITIS, BILATERAL: Primary | ICD-10-CM

## 2024-04-01 DIAGNOSIS — M79.671 BILATERAL FOOT PAIN: ICD-10-CM

## 2024-04-01 DIAGNOSIS — M79.672 BILATERAL FOOT PAIN: ICD-10-CM

## 2024-04-01 DIAGNOSIS — R29.898 DECREASED GRIP STRENGTH: Primary | ICD-10-CM

## 2024-04-01 DIAGNOSIS — R53.1 DECREASED STRENGTH, ENDURANCE, AND MOBILITY: ICD-10-CM

## 2024-04-01 DIAGNOSIS — R68.89 DECREASED STRENGTH, ENDURANCE, AND MOBILITY: ICD-10-CM

## 2024-04-01 DIAGNOSIS — M25.672 DECREASED RANGE OF MOTION OF BOTH ANKLES: ICD-10-CM

## 2024-04-01 DIAGNOSIS — Z78.9 DECREASED ACTIVITIES OF DAILY LIVING (ADL): ICD-10-CM

## 2024-04-01 PROCEDURE — 97530 THERAPEUTIC ACTIVITIES: CPT

## 2024-04-01 PROCEDURE — 97110 THERAPEUTIC EXERCISES: CPT

## 2024-04-01 NOTE — TELEPHONE ENCOUNTER
Spoke with pt. She reports Omni5 not maintaining automated mode due to low blood sugar patterns and insulin being paused for too long period. Provider Abner notified to assist with pump rate adjustment. Pt verbalized understanding.

## 2024-04-01 NOTE — TELEPHONE ENCOUNTER
----- Message from Sara Matta sent at 4/1/2024 12:08 PM CDT -----  Contact: Yolanda  .Type:  Patient Returning Call    Who Called:Yolanda  Who Left Message for Patient:nurse  Does the patient know what this is regarding?:yes  Would the patient rather a call back or a response via [facility]? Call back  Best Call Back Number:.[phone]   Additional Information: na            Thanks  JERALD

## 2024-04-01 NOTE — TELEPHONE ENCOUNTER
----- Message from Roselyn York sent at 4/1/2024 10:23 AM CDT -----  Contact: self  Pt is asking for an return call in reference to having problems with her insulin pump and is needing help with the pump states that its urgent ,please call back at .[phone] Thx CJ

## 2024-04-01 NOTE — PROGRESS NOTES
[facility]   Physical Therapy Treatment Note        Name: Yolanda Betts  Clinic Number: [id number]    Therapy Diagnosis:   Encounter Diagnoses   Name Primary?    Plantar fasciitis, bilateral Yes    Decreased strength, endurance, and mobility     Decreased range of motion of both ankles     Bilateral foot pain        Physician: Sasha Sorenson MD, Kathy Galan NP    Visit Date: 4/1/2024    Physician Orders: PT Eval and Treat  Medical Diagnosis from Referral: bilateral plantar fasciitis   Evaluation Date: 2/9/2024  Authorization Period Expiration: 5/9/2024  Plan of Care Expiration: 5/9/2024  Progress Note Due: 4/7/2024  Visit # / Visits authorized: 8/20 (+ evaluation)  FOTO: 1/3 (last performed on 2/9/2024)      Precautions: Standard and Diabetes     PTA Visit #: 0/5     Time In: 1423  Time Out: 1508  Total Billable Time: 45 minutes Billing reflects [place] medicaid guidelines, billing all therapy as therapeutic-exercise     Subjective     Patient reports: that today is doing okay today. Patient reports that she was hurting more for about a day after our last session but has been feeling better since then.     She was compliant with home exercise program.  Response to previous treatment: sore after evaluation  Functional change: decreased standing and walking tolerance and pain with raising up on tip toes     Pain: 7/10   Location: low back and shoulders    Objective      Objective Measures updated at progress report or POC update only unless otherwise noted.     Treatment     Total Billed TherEx Time: (45) minutes -  Billing reflects [place] medicaid guidelines, billing all therapy as therapeutic-exercise    Yolanda received aquatic therapy with the use of water to decrease the pull of gravity and weightbearing forces on the body to increase tolerance to resisted therex for 45 minutes including the following exercise as well as stepping in and out of the pool with use of  "upper extremity on handrail and step-to pattern:     All performed at lowest   Exercise Performed Today  4/1/2024   Forward/backward walking x 4 minutes   Side-stepping x 4 minutes each side    Treadmill, backwards,  mph   minutes   Marching, alternating x 3 minutes   Hamstring curls, isolated x 2 minutes B   Hip 3-way, B lower extremity x 3 minutes each    Abdominal Bracing x 3 minutes   Un-weighting on noodle   Minutes    Bicycle forward/backward Unable  minutes each    Flutter kick   minutes    Scissor kicks   minutes    Hip flexor Stretch on treadmill   minutes    Quad stretch on step      Hamstring stretch on step   2 x 30"    Gastroc stretch  at wall  2 x 30"         Heel/toe raises  x 3 minutes    1/4 squat    minutes    Standing hip circles clockwise/counter clockwise   minutes each    Standing figure 8   10x each         UE alt flexion level 1 paddles  3 Minutes    UE alt abduction  paddles  3 Minutes    UE alt hugs  paddles   Minutes    UE internal rotation/external rotation  paddles   Minutes    UE alt rows level 1 paddles x 2 Minutes    UE rows bilateral level 1 paddles x 3 Minutes        x = exercise details same as prior session    Yolanda received the treatments listed below:     MANUAL THERAPY TECHNIQUES were applied for (0) minutes, including:    Manual Intervention Performed Today    Soft Tissue Mobilization []  []  [x] bilateral  gastrocnemius, soleus, posterior tibialis, anterior tibialis , foot intrinsics   Attempted on lumbar and thoracic paraspinals, periscapular musculature    Joint Mobilizations [] Anterior and posterior mobs     []     []    Functional Dry Needling  []      Plan for Next Visit: Continue as needed         THERAPEUTIC EXERCISES to develop strength, endurance, ROM, flexibility, posture, and core stabilization for (0) minutes including:    Intervention Performed Today    Nustep   6 minutes, level 1   Bike  Attempted but patient requested to stop after 2 minutes   Gastrocnemius " "stretch   3 x 30 standing on wedge bilateral    Soleus stretch  3 x 30 standing on wedge bilateral    HEP established for low back  See Patient Instructions for details   Abdominal bracing  3 x 10, 3" holds on and off   Pelvic tilts  Attempted a few repetitions, but her pain in general was too much to move much so activity was stopped and patient decided to hold PT today               Plan for Next Visit:        NEUROMUSCULAR RE-EDUCATION ACTIVITIES to improve Balance, Coordination, Kinesthetic, Sense, Proprioception, and Posture for (0) minutes.  The following were included:    Intervention Performed Today    Toe yoga  3 minutes bilateral alternating    Toe abduction/adduction  3 minutes bilateral LE   Toe flexion/extension  3 minutes bilateral    Ankle circles   X 30 counter clockwise   X 30 clockwise    BAPS Board   2' each position (DF/PF, IV/EV, CW/CCW)   Arch press ups (added)   3 x 10 bilateral    Ankle active range of motion   Yellow band 3 x 10 long sitting with lower extremity elevated bilateral   Dorsiflexion   Plantarflexion  Inversion  Eversion                     Plan for Next Visit:      Patient Education and Home Exercises       Home Exercises Provided and Patient Education Provided     Education provided: (during session) minutes  PURPOSE: Patient educated on the impairments noted above and the effects of physical therapy intervention to improve overall condition and QOL.   EXERCISE: Patient was educated on all the above exercise prior/during/after for proper posture, positioning, and execution for safe performance with home exercise program.   STRENGTH: Patient educated on the importance of improved core and extremity strength in order to improve alignment of the spine and extremities with static positions and dynamic movement.   3/1/2024: educated on the importance of maintaining core stability with all activties of daily living   3/5/2024: added home exercise program to my chart and gave patient " paper copy and gave patient yellow band    Written Home Exercises Provided: yes.  Exercises were reviewed and Yolanda was able to demonstrate them prior to the end of the session.  Yolanda demonstrated good  understanding of the education provided. See EMR under Patient Instructions for exercises provided during therapy sessions.    Assessment     Patient tolerated aquatic therapy really well today. Due to pool level being lower, patient was able to come to bottom of pool for all exercises which allowed for better upper extremity support and overall better stability with all exercises.     Yolanda is progressing well towards her goals.   Patient prognosis is Guarded.     Patient will continue to benefit from skilled outpatient physical therapy to address the deficits listed in the problem list box on initial evaluation, provide pt/family education and to maximize patient's level of independence in the home and community environment.     Patient's spiritual, cultural and educational needs considered and pt agreeable to plan of care and goals.       Anticipated Barriers for therapy: co-morbidities      Goals:   Short Term Goals:  6 weeks Status  Date Met   PAIN: Pt will report worst pain of 5/10 in order to progress toward max functional ability and improve quality of life. [x] Progressing  [] Met  [] Not Met     FUNCTION (FOOT): Patient will demonstrate improved function as indicated by a score of greater than or equal to 22 out of 100 on FOTO. [] Progressing  [x] Met  [] Not Met Met     FUNCTION (Lumbar): Patient will demonstrate improved function as indicated by a score of greater than or equal to 21 out of 100 on FOTO. [x] Progressing  [] Met  [] Not Met    STRENGTH: Patient will improve strength to 50% of stated goals, listed in objective measures above, in order to progress towards independence with functional activities. [x] Progressing  [] Met  [] Not Met     POSTURE: Patient will correct postural deviations in sitting  and standing, to decrease pain and promote long term stability.  [x] Progressing  [] Met  [] Not Met Partially Met  3/8/2024    GAIT: Patient will demonstrate improved gait mechanics in order to improve functional mobility, improve quality of life, and decrease risk of further injury or fall.  [x] Progressing  [] Met  [] Not Met     HEP: Patient will demonstrate independence with HEP in order to progress toward functional independence. [] Progressing  [x] Met  [] Not Met 3/8/2024       Long Term Goals:  12 weeks Status Date Met   PAIN: Pt will report worst pain of 3/10 in order to progress toward max functional ability and improve quality of life [x] Progressing  [] Met  [] Not Met     FUNCTION (Foot): Patient will demonstrate improved function as indicated by a score of greater than or equal to 34 out of 100 on FOTO. [] Progressing  [x] Met  [] Not Met 3/8/2024    FUNCTION (Lumbar): Patient will demonstrate improved function as indicated by a score of greater than or equal to 26 out of 100 on FOTO. [x] Progressing  [] Met  [] Not Met    MOBILITY: Patient will improve AROM to stated goals, listed in objective measures above, in order to return to maximal functional potential and improve quality of life.  [x] Progressing  [] Met  [] Not Met     STRENGTH: Patient will improve strength to stated goals, listed in objective measures above, in order to improve functional independence and quality of life.  [x] Progressing  [] Met  [] Not Met     GAIT: Patient will demonstrate normalized gait mechanics with minimal compensation in order to return to PLOF. [x] Progressing  [] Met  [] Not Met     Patient will return to normal ADL's, IADL's, community involvement, recreational activities, and work-related activities with less than or equal to 1/10 pain and maximal function.  [x] Progressing  [] Met  [] Not Met          Plan     Continue Plan of Care (POC) and progress per patient tolerance. See treatment section for details on  planned progressions next session.    Updated Certification Period: 3/8/2024 to 6/6/2024  Recommended Treatment Plan: 2 times per week for 12 weeks: to include any combination of the following interventions: virtual visits, dry needling, modalities, electrical stimulation (IFC, Pre-Mod, Attended with Functional Dry Needling), Aquatic Therapy, Cervical/Lumbar Traction, Gait Training, Manual Therapy, Neuromuscular Re-ed, Patient Education, Self Care, Therapeutic Exercise, and Therapeutic Activites        Starla Ellis, PT, DPT

## 2024-04-01 NOTE — PROGRESS NOTES
[facility]  Occupational Therapy Treatment Note     Date: 4/1/2024  Name: Yolanda Betts  Clinic Number: [id number]    Therapy Diagnosis:   Encounter Diagnoses   Name Primary?    Decreased  strength Yes    Decreased activities of daily living (ADL)     Pain      Physician: Neville Roth,*    Physician Orders: Occupational Therapy to Evaluate and Treat  Medical Diagnosis:   G56.03 (ICD-10-CM) - Bilateral carpal tunnel syndrome   Z98.890 (ICD-10-CM) - S/p bilateral carpal tunnel release      Surgical Procedure and Date: Bilateral carpal tunnel release, 12/18/2023     Evaluation Date: 3/5/2024  Insurance Authorization Period Expiration: 2/27/2023 - 12/31/2024  Plan of Care Certification Period: 3/5/2024 - 6/5/2024  Progress Note Due: 4/5/2024      Date of Return to MD: N/A  Visit # / Visits authorized: 6 / 24  FOTO: 1/3     Precautions:  Standard     Time In: 1:00  Time Out: 2:00  Total Appointment Time (timed & untimed codes): 60 minutes   (billing reflects skilled 1:1 time)      Subjective     Patient reports:she is having less pain in B wrist/hands and she is able to deena her hands more effectively in ADL/IADL's   She was compliant with home exercise program given last session.   Response to previous treatment: less pain and increased    Functional change: able to hold and manipulate light objects without difficulty and/or pain.     Pain: 0-1/10  Location: bilateral wrists      Objective     Objective Measures updated at progress report unless specified.    Treatment     Yolanda received the treatments listed below:      Modalities performed as follows: Ultrasound first performed on the L wrist/hand according to parameters below, while R wrist/hand received paraffin.     Direct contact modalities after being cleared for contraindications: 3.0 MHz, 1.0 W/cm2, continuous, 10 min to L wrist/hand surgical site, to decrease pain & edema, increase circulation and tissue  extensibility.      Supervised modalities after being cleared for contraindications: Paraffin w/ MHP to R wrist/hand, pre-tx to decrease pain & increase tissue extensibility     Manual therapy techniques: Myofacial release, Manual Lymphatic Drainage, Soft tissue Mobilization, and scar management were applied to the: B forearm/wrist/hands forearm/wrist/hand for 26 minutes, including:   use of hand techniques, cupping and IASTM tools to increase blood flow/circulation, improve soft tissue pliability and decrease pain.     Therapeutic exercises to develop strength, endurance, and ROM for 24 minutes, including:  EXERCISES: B HANDS   AROM:    Dexterciser 3 min   Isospheres 3 min       Strengthening:    PHG 2/10 reps, setting 2 (22#)   Digi-flex 15 reps, red   Resistive Clothespin w/ tripod & lateral pinch 10 reps each pinch, red       Add Next Visit:    T-putty  Yellow/red   -Molding 2 min   -Grasp Manipulation 3 reps, 5 sec hold   -Pancake & Extension Blooms 3 reps   -Log Rolls w/ tripod & lateral pinch 1 log each        Patient Education and Home Exercises     Education provided:   - Reviewed HEP  - Progress towards goals     Written Home Exercises Provided: Patient instructed to cont prior HEP.  Exercises were reviewed and Yolanda was able to demonstrate them prior to the end of the session.  Yolanda demonstrated good  understanding of the home exercise program provided. See electronic medical record under Patient Instructions for exercises provided during therapy sessions.       Assessment     Pain is trace in B wrist/hands.  Scar tissue is minimally dense in B hands at surgical sites.  Patient was able to perform all above listed therapeutic exercises without increased pain or difficulty today.     Yolanda is progressing well towards her goals and there are no updates to goals at this time. Pt prognosis is Good.     Patient will continue to benefit from skilled outpatient occupational therapy to address the deficits listed  in the problem list on initial evaluation provide patient/family education and to maximize patient's level of independence in the home and community environment.     Patient's spiritual, cultural and educational needs considered and patient agreeable to plan of care and goals.    Anticipated barriers to occupational therapy: comorbid diagnosis, compliance with HEP and attendance to therapy as recommended     Goals:  Short Term Goals:  6 weeks  Progress       Pain: Patient will demonstrate improved pain by reports of less than or equal to 3/10 worst pain on the verbal rating scale in order to progress toward maximal functional ability and improve quality of life. PC   Function: Patient will demonstrate improved function as indicated by a functional intake score of more than or equal to 40 out of 100 on FOTO. PC   Mobility: Patient will improve AROM to 50% of stated goals, listed in objective measures above, in order to progress towards independence with functional activities.  PC   Strength: Patient will improve strength to 50% of stated goals, listed in objective measures above, in order to progress towards independence with functional activities.  PC   HEP: Patient will demonstrate independence with HEP in order to progress toward functional independence. PC      Long Term Goals:  12 weeks  Progress      Pain: Patient will demonstrate improved pain by reports of less than or equal to 2/10 worst pain on the verbal rating scale in order to progress toward maximal functional ability and improve quality of life.   PC   Function: Patient will demonstrate improved function as indicated by a functional intake score of more than or equal to 52 out of 100 on FOTO. PC   Mobility: Patient will improve AROM to stated goals, listed in objective measures above, in order to return to maximal functional potential and improve quality of life. PC   Strength: Patient will improve strength to stated goals, listed in objective measures  above, in order to improve functional independence and quality of life. PC   Patient will return to normal ADL's, IADL's, community involvement, recreational activities, and work-related activities with less than or equal to 1/10 pain and maximal function.  PC      Goals Key:  PC= Progressing/Continue;       PM= Partially Met;       Goal Met= Goal Met      DC= Discontinue     Plan     Plan of Care Certification: 3/5/2024 to 6/5/2024.      Outpatient Occupational Therapy 1-2 times weekly for 12 weeks to include the following interventions:  Therapeutic Exercise, Functional Activities, Patient Education, Home Exercise Program, ADL Training, Transfer/Mobility Training, Manual Therapy, Neuromuscular Reeducation/Sensory, Electrical Stimulation/TENS/Interferential, Moist Heat/Ice/Paraffin, Cognitive Preceptual Retraining, Orthotic Fabrication/Fit/Management, Ultrasound/Phonophoresis, and Edema Control/Fluidotherapy.  Updates/Grading for next session: progress ROM, as tolerated    Torrey Huston OT   4/1/2024

## 2024-04-01 NOTE — TELEPHONE ENCOUNTER
Spoke with pt. She reports difficulty keeping Omni5 in automated mode over weekend but couldn't recall error message. She explained that Dexcom was working well and SG wasn't elevated. She changed Pod and Dexcom sensor but continued to have trouble this morning.     While on phone, pt switched Omni5 to automated mode and didn't have error. She agrees to keep log of any error messages going forward and send via [employer]. Successful outreach.

## 2024-04-03 ENCOUNTER — NURSE TRIAGE (OUTPATIENT)
Dept: ADMINISTRATIVE | Facility: CLINIC | Age: 54
End: 2024-04-03
Payer: MEDICAID

## 2024-04-03 ENCOUNTER — PATIENT MESSAGE (OUTPATIENT)
Dept: PULMONOLOGY | Facility: CLINIC | Age: 54
End: 2024-04-03
Payer: MEDICAID

## 2024-04-04 ENCOUNTER — HOSPITAL ENCOUNTER (OUTPATIENT)
Facility: HOSPITAL | Age: 54
Discharge: HOME | End: 2024-04-04
Attending: INTERNAL MEDICINE | Admitting: INTERNAL MEDICINE
Payer: MEDICAID

## 2024-04-04 ENCOUNTER — ANESTHESIA EVENT (OUTPATIENT)
Dept: ENDOSCOPY | Facility: HOSPITAL | Age: 54
End: 2024-04-04
Payer: MEDICAID

## 2024-04-04 ENCOUNTER — ANESTHESIA (OUTPATIENT)
Dept: ENDOSCOPY | Facility: HOSPITAL | Age: 54
End: 2024-04-04
Payer: MEDICAID

## 2024-04-04 ENCOUNTER — OFFICE VISIT (OUTPATIENT)
Dept: DIABETES | Facility: CLINIC | Age: 54
End: 2024-04-04
Payer: MEDICAID

## 2024-04-04 DIAGNOSIS — Z12.11 COLON CANCER SCREENING: Primary | ICD-10-CM

## 2024-04-04 DIAGNOSIS — E78.5 DYSLIPIDEMIA ASSOCIATED WITH TYPE 2 DIABETES MELLITUS: ICD-10-CM

## 2024-04-04 DIAGNOSIS — E11.69 TYPE 2 DIABETES MELLITUS WITH OTHER SPECIFIED COMPLICATION, UNSPECIFIED WHETHER LONG TERM INSULIN USE: Primary | ICD-10-CM

## 2024-04-04 DIAGNOSIS — E11.59 HYPERTENSION COMPLICATING DIABETES: ICD-10-CM

## 2024-04-04 DIAGNOSIS — I15.2 HYPERTENSION COMPLICATING DIABETES: ICD-10-CM

## 2024-04-04 DIAGNOSIS — E11.69 DYSLIPIDEMIA ASSOCIATED WITH TYPE 2 DIABETES MELLITUS: ICD-10-CM

## 2024-04-04 LAB — POCT GLUCOSE: 90 MG/DL (ref 70–110)

## 2024-04-04 PROCEDURE — 27201089 HC SNARE, DISP (ANY): Performed by: INTERNAL MEDICINE

## 2024-04-04 PROCEDURE — 25000003 PHARM REV CODE 250: Performed by: NURSE ANESTHETIST, CERTIFIED REGISTERED

## 2024-04-04 PROCEDURE — 88305 TISSUE EXAM BY PATHOLOGIST: CPT | Mod: 26,,, | Performed by: PATHOLOGY

## 2024-04-04 PROCEDURE — 88305 TISSUE EXAM BY PATHOLOGIST: CPT | Performed by: PATHOLOGY

## 2024-04-04 PROCEDURE — 1159F MED LIST DOCD IN RCRD: CPT | Mod: CPTII,95,, | Performed by: NURSE PRACTITIONER

## 2024-04-04 PROCEDURE — 63600175 PHARM REV CODE 636 W HCPCS: Performed by: NURSE ANESTHETIST, CERTIFIED REGISTERED

## 2024-04-04 PROCEDURE — 4010F ACE/ARB THERAPY RXD/TAKEN: CPT | Mod: CPTII,95,, | Performed by: NURSE PRACTITIONER

## 2024-04-04 PROCEDURE — 45385 COLONOSCOPY W/LESION REMOVAL: CPT | Performed by: INTERNAL MEDICINE

## 2024-04-04 PROCEDURE — 37000008 HC ANESTHESIA 1ST 15 MINUTES: Performed by: INTERNAL MEDICINE

## 2024-04-04 PROCEDURE — 99214 OFFICE O/P EST MOD 30 MIN: CPT | Mod: 95,,, | Performed by: NURSE PRACTITIONER

## 2024-04-04 PROCEDURE — 45385 COLONOSCOPY W/LESION REMOVAL: CPT | Mod: ,,, | Performed by: INTERNAL MEDICINE

## 2024-04-04 PROCEDURE — 1160F RVW MEDS BY RX/DR IN RCRD: CPT | Mod: CPTII,95,, | Performed by: NURSE PRACTITIONER

## 2024-04-04 PROCEDURE — 37000009 HC ANESTHESIA EA ADD 15 MINS: Performed by: INTERNAL MEDICINE

## 2024-04-04 RX ORDER — PROPOFOL 10 MG/ML
VIAL (ML) INTRAVENOUS
Status: DISCONTINUED | OUTPATIENT
Start: 2024-04-04 | End: 2024-04-04

## 2024-04-04 RX ORDER — LIDOCAINE HYDROCHLORIDE 10 MG/ML
INJECTION, SOLUTION EPIDURAL; INFILTRATION; INTRACAUDAL; PERINEURAL
Status: DISCONTINUED | OUTPATIENT
Start: 2024-04-04 | End: 2024-04-04

## 2024-04-04 RX ADMIN — PROPOFOL 50 MG: 10 INJECTION, EMULSION INTRAVENOUS at 09:04

## 2024-04-04 RX ADMIN — SODIUM CHLORIDE, POTASSIUM CHLORIDE, SODIUM LACTATE AND CALCIUM CHLORIDE: 600; 310; 30; 20 INJECTION, SOLUTION INTRAVENOUS at 08:04

## 2024-04-04 RX ADMIN — PROPOFOL 40 MG: 10 INJECTION, EMULSION INTRAVENOUS at 09:04

## 2024-04-04 RX ADMIN — LIDOCAINE HYDROCHLORIDE 50 MG: 10 SOLUTION INTRAVENOUS at 09:04

## 2024-04-04 RX ADMIN — PROPOFOL 100 MG: 10 INJECTION, EMULSION INTRAVENOUS at 09:04

## 2024-04-04 RX ADMIN — PROPOFOL 30 MG: 10 INJECTION, EMULSION INTRAVENOUS at 09:04

## 2024-04-04 NOTE — H&P
Short Stay Endoscopy History and Physical    PCP - Sasha Sorenson MD    Procedure - Colonoscopy    H/O colon polyp(s), denies any acute issues. Needs surveillance colonoscopy.    ROS:  Constitutional: No fevers, chills, No weight loss  ENT: No allergies  CV: No chest pain  Pulm: No cough, No shortness of breath  Ophtho: No vision changes  GI: see HPI  Derm: No rash  Heme: No lymphadenopathy, No bruising  MSK: No arthritis  : No dysuria, No hematuria  Endo: No hot or cold intolerance  Neuro: No syncope, No seizure  Psych: No anxiety, No depression    Medical History:  has a past medical history of Abnormal Pap smear of cervix, Anemia, Anxiety, Arthritis, Asthma (10/11/2016), Bipolar 1 disorder, Diabetes mellitus, type 2, Dyslipidemia associated with type 2 diabetes mellitus (2019), General anesthetics causing adverse effect in therapeutic use, Genital warts, GERD (gastroesophageal reflux disease), Herpes simplex virus (HSV) infection, Hyperlipidemia, Hypertension, Hypertension complicating diabetes (2019), Migraine with aura and without status migrainosus, not intractable (2016), Mild persistent asthma without complication (10/11/2016), Morbid obesity with body mass index (BMI) of 45.0 to 49.9 in adult (2017), Obstructive sleep apnea, ALEN (obstructive sleep apnea), Schizoaffective disorder, bipolar type (2019), Seasonal allergic rhinitis due to pollen (2019), Type 2 diabetes mellitus with hyperglycemia, with long-term current use of insulin (2017), and Type 2 diabetes mellitus with hyperglycemia, without long-term current use of insulin (2017).    Surgical History:  has a past surgical history that includes  section; Mouth surgery (); Breast surgery (Bilateral, ); Tubal ligation; Colonoscopy; Belt abdominoplasty (); Colonoscopy (N/A, 2018); Colonoscopy (N/A, 2018); Oophorectomy; Hysterectomy; abcess removed right back; Robot-assisted  cholecystectomy using da Sharonda Xi (N/A, 1/3/2020); Total Reduction Mammoplasty; Epidural steroid injection into cervical spine (N/A, 1/31/2023); Esophagogastroduodenoscopy (N/A, 3/10/2023); Colonoscopy (N/A, 3/10/2023); Carpal tunnel release (Bilateral, 12/18/2023); Injection of anesthetic agent around medial branch nerves innervating cervical facet joint (Bilateral, 12/19/2023); and Epidural steroid injection into cervical spine (N/A, 1/30/2024).    Family History: family history includes Alcohol abuse in her brother; Anesthesia problems in her mother; Asthma in her mother; Breast cancer in her maternal cousin, maternal cousin, maternal cousin, and maternal cousin; COPD in her mother; Cancer in her father and maternal grandfather; Cataracts in her maternal grandfather, maternal grandmother, paternal grandfather, and paternal grandmother; Diabetes in her maternal grandfather, maternal grandmother, and mother; Glaucoma in her maternal grandfather and mother; Heart disease in her maternal grandfather, maternal grandmother, paternal grandfather, and paternal grandmother; Hyperlipidemia in her father, maternal grandfather, maternal grandmother, mother, paternal grandfather, and paternal grandmother; Hypertension in her brother, father, maternal grandfather, maternal grandmother, mother, paternal grandfather, and paternal grandmother; Macular degeneration in her maternal grandfather; No Known Problems in her sister; Thyroid disease in her mother.. Otherwise no colon cancer, inflammatory bowel disease, or GI malignancies.    Social History:  reports that she has never smoked. She has never used smokeless tobacco. She reports current alcohol use. She reports that she does not use drugs.    Review of patient's allergies indicates:   Allergen Reactions    Codeine Itching    Hydromorphone Other (See Comments)     Can't wake up for long time if taken    Slow to wake up after surgery after receiving    Aleve [naproxen sodium]   "    Increases BP    Ibuprofen      Increases BP    Neuromuscular blockers, steroidal Hives     some    Pregabalin Itching    Latex, natural rubber Rash    Morphine Rash     itching    Norco [hydrocodone-acetaminophen] Itching, Rash and Hallucinations    Secobarbital sodium Rash     itching    Tylox [oxycodone-acetaminophen] Rash       Medications:   Medications Prior to Admission   Medication Sig Dispense Refill Last Dose    albuterol (PROVENTIL) 2.5 mg /3 mL (0.083 %) nebulizer solution Take 2.5 mg by nebulization every 4 (four) hours as needed. Times a day 75 mL 1     albuterol (PROVENTIL/VENTOLIN HFA) 90 mcg/actuation inhaler Inhale 2 puffs into the lungs every 6 (six) hours as needed for Wheezing. 8.5 g 1     ALPRAZolam (XANAX) 1 MG tablet Take 1 tablet (1 mg total) by mouth 2 (two) times daily as needed for Anxiety. 60 tablet 2     amlodipine-valsartan (EXFORGE)  mg per tablet Take 1 tablet by mouth once daily. 90 tablet 3     amlodipine-valsartan (EXFORGE)  mg per tablet Take 1 tablet by mouth in the morning. 90 tablet 1     aspirin (ECOTRIN) 81 MG EC tablet Take 1 tablet by mouth daily 30 tablet 0     atorvastatin (LIPITOR) 20 MG tablet Take 1 tablet (20 mg total) by mouth every evening 90 tablet 3     BD INSULIN SYRINGE ULTRA-FINE 1/2 mL 30 gauge x 1/2" Syrg   0     blood-glucose sensor (DEXCOM G6 SENSOR) Mariela 1 each by Misc.(Non-Drug; Combo Route) route every 10 days. 3 each 11     blood-glucose sensor (DEXCOM G7 SENSOR) Mariela Use to check bg. Change every 10 days 3 each 11     blood-glucose transmitter (DEXCOM G6 TRANSMITTER) Mariela 1 each by Misc.(Non-Drug; Combo Route) route every 3 (three) months. 1 each 3     cariprazine (VRAYLAR) 1.5 mg Cap Take 1 capsule (1.5 mg total) by mouth once daily. Take with 3 mg cap 30 capsule 0     [START ON 4/21/2024] cariprazine (VRAYLAR) 4.5 mg Cap Take 1 capsule (4.5 mg total) by mouth once daily. 30 capsule 1     celecoxib (CELEBREX) 100 MG capsule Take 1 " "capsule (100 mg total) by mouth 2 (two) times daily. 60 capsule 2     cyclobenzaprine (FLEXERIL) 10 MG tablet Take 1 tablet (10 mg total) by mouth 3 (three) times daily as needed (Pain). 90 tablet 11     DULoxetine (CYMBALTA) 60 MG capsule Take 1 capsule (60 mg total) by mouth 2 (two) times daily. 180 capsule 3     ergocalciferol (VITAMIN D2) 50,000 unit Cap Take 1 capsule twice weekly for 3 weeks then weekly 12 capsule 3     estradioL (ESTRACE) 0.01 % (0.1 mg/gram) vaginal cream Place 1 g vaginally twice a week. 42.5 g 3     fenofibrate (TRICOR) 145 MG tablet Take 1 tablet (145 mg total) by mouth once daily. 90 tablet 3     fluticasone propionate (FLONASE) 50 mcg/actuation nasal spray 2 sprays (100 mcg total) by Each Nare route once daily. 16 g 11     frovatriptan (FROVA) 2.5 MG tablet Take 1 tablet at onset of acute migraine. May take 1 more tablet 2 hours later if needed. (MAX 2 TABLET PER DAY. MAX 4 TABLETS PER WEEK.) 9 tablet 1     frovatriptan (FROVA) 2.5 MG tablet Take 1 tablet by mouth as needed for Migraine for up to 180 days. If recurs, may repeat after 2 hours. Max of 3 tabs in 24 hours. 10 tablet 2     furosemide (LASIX) 20 MG tablet Take 1 tablet (20 mg total) by mouth once daily. 90 tablet 3     glucagon (BAQSIMI) 3 mg/actuation Spry 1 spray by Nasal route as needed (hypoglycemia). For emergency use. May repeat 1 time after 15 minutes 2 each 1     insulin aspart U-100 (NOVOLOG FLEXPEN U-100 INSULIN) 100 unit/mL (3 mL) InPn pen Inject 24 Units into the skin 3 (three) times daily before meals. Use per sliding scale for breakfast and dinner 45 mL 1     insulin aspart U-100 (NOVOLOG U-100 INSULIN ASPART) 100 unit/mL injection Inject 100 Units into the skin Every 3 (three) days. To use via insulin pump 10 mL 5     insulin pump cart,automated,BT (OMNIPOD 5 G6 PODS, GEN 5,) Crtg Inject 1 each into the skin Every 3 (three) days. 30 each 3     insulin syringe-needle U-100 0.3 mL 30 gauge x 5/16" Syrg 1 each by " "Misc.(Non-Drug; Combo Route) route Every 3 (three) days. 100 each 2     lamoTRIgine (LAMICTAL) 200 MG tablet Take 1 tablet (200 mg total) by mouth 2 (two) times daily. 60 tablet 2     levocetirizine (XYZAL) 5 MG tablet Take 1 tablet (5 mg total) by mouth every evening. 30 tablet 11     lifitegrast (XIIDRA) 5 % Dpet Place 1 drop into both eyes 2 (two) times daily. 60 each 12     magnesium oxide (MAG-OX) 400 mg (241.3 mg magnesium) tablet Take 1 tablet (400 mg total) by mouth once daily. 90 tablet 0     methylPREDNISolone (MEDROL DOSEPACK) 4 mg tablet use as directed 21 tablet 1     metoprolol succinate (TOPROL-XL) 25 MG 24 hr tablet Take 1 tablet (25 mg total) by mouth once daily. 90 tablet 3     metoprolol succinate (TOPROL-XL) 25 MG 24 hr tablet Take 1 tablet by mouth in the morning. 90 tablet 1     mirtazapine (REMERON) 30 MG tablet Take 1 tablet (30 mg total) by mouth every evening. 30 tablet 1     omeprazole (PRILOSEC) 40 MG capsule Take 1 capsule (40 mg total) by mouth once daily. 90 capsule 3     pen needle, diabetic (BD ULTRA-FINE MINI PEN NEEDLE) 31 gauge x 3/16" Ndle Use one needle 5 times a day 200 each 11     pen needle, diabetic (BD ULTRA-FINE MINI PEN NEEDLE) 31 gauge x 3/16" Ndle Use 5 a day 200 each 11     polyethylene glycol (GLYCOLAX) 17 gram/dose powder Take 17 g by mouth once daily. 1530 g 3     secukinumab (COSENTYX PEN, 2 PENS,) 150 mg/mL PnIj Inject 300 mg (2 mL) into the skin every 28 days. 2 mL 6     semaglutide (OZEMPIC) 2 mg/dose (8 mg/3 mL) PnIj Inject 2 mg into the skin every 7 days. 3 mL 2     sod sulf-pot chloride-mag sulf (SUTAB) 1.479-0.188- 0.225 gram tablet Take 12 tablets by mouth once daily. Take according to package instructions with indicated amount of water. 24 tablet 0     spironolactone (ALDACTONE) 25 MG tablet Take 1 tablet (25 mg total) by mouth once daily. 90 tablet 3     spironolactone (ALDACTONE) 25 MG tablet Take 1 tablet by mouth in the morning. 90 tablet 1     " SYMBICORT 160-4.5 mcg/actuation HFAA Inhale 2 puffs into the lungs in the morning and 2 puffs before bedtime. 2 puffs every 12 hours 10.2 g 2        Objective Findings:    Vital Signs: There were no vitals filed for this visit.      Physical Exam:  General Appearance: Well appearing in no acute distress  Eyes:    No scleral icterus  ENT: Neck supple, Lips, mucosa, and tongue normal; teeth and gums normal  Lungs: CTA bilaterally in anterior and posterior fields, no wheezes, no crackles.  Heart:  Regular rate, S1, S2 normal, no murmurs heard.  Abdomen: Soft, non tender, non distended with normal bowel sounds. No hepatosplenomegaly, ascites, or mass.  Extremities: No clubbing, cyanosis or edema  Skin: No rash    Labs:  Lab Results   Component Value Date    WBC 6.94 03/11/2024    HGB 11.9 (L) 03/11/2024    HCT 38.6 03/11/2024     03/11/2024    CHOL 107 (L) 08/08/2023    TRIG 98 08/08/2023    HDL 44 08/08/2023    ALT 22 03/11/2024    AST 15 03/11/2024     03/11/2024    K 4.9 03/11/2024     03/11/2024    CREATININE 0.8 03/11/2024    BUN 13 03/11/2024    CO2 27 03/11/2024    TSH 2.628 05/20/2021    INR 1.0 06/26/2020    HGBA1C 6.5 (H) 11/29/2023       I have explained the risks and benefits of endoscopy procedures to the patient including but not limited to bleeding, perforation, infection, and death.    Proceed with colonoscopy for h/o colon polyp(s).

## 2024-04-04 NOTE — PLAN OF CARE
Dr Og came to bedside and discussed findings. NO N/V,  no abdominal pain, no GI bleeding, and vitals stable.  Pt discharged from unit.

## 2024-04-04 NOTE — TELEPHONE ENCOUNTER
Pt is having colonoscopy in the morning. Pt is on insulin pump and wasn't given instructions on what to do.     Dispo is to call PCP now.  Spoke with Dr. Oshea who is unsure about endo policy with insulin pump. Spoke with on-call end nurse Caity Fam RN who said they usually jsut have the patient turn it off and when asked when she said that it usually depends on the provider. She recommended patient turn her insulin pump off the morning of the procedure. Notified patient who v/u.     Reason for Disposition   [1] Caller has URGENT question or concern AND [2] triager unable to answer question    Additional Information   Negative: Shock suspected (e.g., cold/pale/clammy skin, too weak to stand, low BP, rapid pulse)   Negative: Difficult to awaken or acting confused (e.g., disoriented, slurred speech)   Negative: Passed out (i.e., lost consciousness, collapsed and was not responding)   Negative: Sounds like a life-threatening emergency to the triager   Negative: SEVERE abdomen pain (e.g., excruciating)   Negative: SEVERE rectal bleeding (large blood clots; on and off, or constant bleeding)   Negative: SEVERE dizziness (e.g., unable to stand, requires support to walk, feels like passing out now)   Negative: SEVERE vomiting (e.g., 6 or more times/day)   Negative: [1] MODERATE rectal bleeding (small blood clots, passing blood without stool, or toilet water turns red) AND [2] more than once   Negative: [1] MILD-MODERATE abdomen pain AND [2] constant AND [3] present > 2 hours   Negative: [1] Drinking very little AND [2] dehydration suspected (e.g., no urine > 12 hours, very dry mouth, very lightheaded)   Negative: Patient sounds very sick or weak to the triager   Negative: Fever > 100.4 F (38.0 C)   Negative: [1] Abdominal bloating, cramping, nausea, or vomiting while drinking bowel prep AND [2] CANNOT finish bowel prep AND [3] has tried recommended Care Advice    Protocols used: Colonoscopy Symptoms and  [id number]

## 2024-04-04 NOTE — ANESTHESIA PREPROCEDURE EVALUATION
04/04/2024  Yolanda Betts is a 53 y.o., female.      Pre-op Assessment    I have reviewed the Patient Summary Reports.     I have reviewed the Nursing Notes. I have reviewed the NPO Status.   I have reviewed the Medications.     Review of Systems  Anesthesia Hx:  No problems with previous Anesthesia                Social:  Non-Smoker       Hematology/Oncology:       -- Anemia:                                  Cardiovascular:     Hypertension           hyperlipidemia    Normal sinus rhythm   Low voltage QRS   Nonspecific T wave abnormality   Abnormal ECG   When compared with ECG of 06-JAN-2021 13:00,   Nonspecific T wave abnormality now evident in Inferior leads   Nonspecific T wave abnormality, worse in Anterior-lateral leads   Confirmed by Kenton Gonzalez MD ([id number]) on 12/11/2023 7:08:51 PM                            Pulmonary:    Asthma mild   Sleep Apnea                Hepatic/GI:  Bowel Prep.   GERD Liver Disease, (fatty liver)  Chronic constipation           Musculoskeletal:     Ankylosing spondylitis            Neurological:      Headaches     Fibromyalgia       Peripheral Neuropathy                          Endocrine:  Diabetes, type 2         Obesity / BMI > 30  Psych:  Psychiatric History anxiety  Bipolar  Schizoaffective                Physical Exam  General: Well nourished and Cooperative    Airway:  Mallampati: II   Mouth Opening: Normal  TM Distance: 4 - 6 cm  Tongue: Normal  Neck ROM: Normal ROM    Dental:  In tact        Anesthesia Plan  Type of Anesthesia, risks & benefits discussed:    Anesthesia Type: Gen Natural Airway, MAC  Intra-op Monitoring Plan: Standard ASA Monitors  Post Op Pain Control Plan: multimodal analgesia  Induction:  IV  Informed Consent: Informed consent signed with the Patient and all parties understand the risks and agree with anesthesia plan.  All questions  answered.   ASA Score: 3  Day of Surgery Review of History & Physical: H&P Update referred to the surgeon/provider.I have interviewed and examined the patient. I have reviewed the patient's H&P dated: There are no significant changes.     Ready For Surgery From Anesthesia Perspective.     .  Lab Results   Component Value Date    WBC 6.94 03/11/2024    HGB 11.9 (L) 03/11/2024    HCT 38.6 03/11/2024    MCV 83 03/11/2024     03/11/2024            Statement Selected

## 2024-04-04 NOTE — PROGRESS NOTES
Subjective:         Patient ID: Yolanda Betts is a 53 y.o. female.  Patient's current PCP is Sasha Sorenson MD.       Chief Complaint: No chief complaint on file.      HPI  Yolanda Betts is a 53 y.o. Black or  female presenting for a follow up for diabetes. Patient has been diagnosed with diabetes for > 10 years. Now on OP5- doing well, pt was experience lows and frequent pump suspension. Correction threshold was increased this week which improved lows    Complications related to diabetes:  HTN, Hyperlipidemia, peripheral neuropathy; denies Pancreatitis; denies Gastroparesis; denies DKA; denies Hx/family Hx of MEN2/MTC; reports Frequent UTIs/yeast infections; Bariatric surgery 6/1/21. IBS- diarrhea/constipation    Past failed treatment include:  Jardiance- multiple yeast infections; Victoza - GI upset. GLP-1- not advised due to GI issues (per GI and DM management), Metformin- low BGs    Blood glucose testing is performed regularly with her Dexcom    Compliance:The patient reports medication and diet compliance most of the time.       The patient location is: [place], [place]  The chief complaint leading to consultation is: DM follow up    Visit type: audiovisual    Face to Face time with patient: 30 minutes of total time spent on the encounter, which includes face to face time and non-face to face time preparing to see the patient (eg, review of tests), Obtaining and/or reviewing separately obtained history, Documenting clinical information in the electronic or other health record, Independently interpreting results (not separately reported) and communicating results to the patient/family/caregiver, or Care coordination (not separately reported). Each patient to whom he or she provides medical services by telemedicine is:  (1) informed of the relationship between the physician and patient and the respective role of any other health care provider with respect to management of the patient; and  (2) notified that he or she may decline to receive medical services by telemedicine and may withdraw from such care at any time.         //   , There is no height or weight on file to calculate BMI.  Her blood sugar in clinic today is:   Lab Results   Component Value Date    POCGLU 78 03/10/2023       Labs reviewed and are noted below.  Her most recent A1C is:  Lab Results   Component Value Date    HGBA1C 6.5 (H) 11/29/2023    HGBA1C 5.6 08/08/2023    HGBA1C 6.3 (H) 01/13/2023     Lab Results   Component Value Date    CPEPTIDE 4.56 02/26/2018     Lab Results   Component Value Date    GLUTAMICACID 0.00 02/26/2018     Glucose   Date Value Ref Range Status   03/11/2024 94 70 - 110 mg/dL Final     Anion Gap   Date Value Ref Range Status   03/11/2024 5 (L) 8 - 16 mmol/L Final     eGFR if    Date Value Ref Range Status   03/08/2022 >60.0 >60 mL/min/1.73 m^2 Final     eGFR if non    Date Value Ref Range Status   03/08/2022 >60.0 >60 mL/min/1.73 m^2 Final     Comment:     Calculation used to obtain the estimated glomerular filtration  rate (eGFR) is the CKD-EPI equation.          CURRENT DM MEDICATIONS:   Diabetes Medications               glucagon (BAQSIMI) 3 mg/actuation Spry 1 spray by Nasal route as needed (hypoglycemia). For emergency use. May repeat 1 time after 15 minutes    insulin aspart U-100 (NOVOLOG FLEXPEN U-100 INSULIN) 100 unit/mL (3 mL) InPn pen Inject 24 Units into the skin 3 (three) times daily before meals. Use per sliding scale for breakfast and dinner    insulin aspart U-100 (NOVOLOG U-100 INSULIN ASPART) 100 unit/mL injection Inject 100 Units into the skin Every 3 (three) days. To use via insulin pump                   Diabetes Management Status    Statin: Taking  ACE/ARB: Taking    Screening or Prevention Patient's value Goal Complete/Controlled?   HgA1C Testing and Control   Lab Results   Component Value Date    HGBA1C 6.5 (H) 11/29/2023      Annually/Less than 8% Yes    Lipid profile : 08/08/2023 Annually Yes   LDL control Lab Results   Component Value Date    LDLCALC 43.4 (L) 08/08/2023    Annually/Less than 100 mg/dl  Yes   Nephropathy screening Lab Results   Component Value Date    LABMICR 22.0 12/11/2023     Lab Results   Component Value Date    PROTEINUA Trace (A) 01/23/2024    Annually Yes   Blood pressure BP Readings from Last 1 Encounters:   04/04/24 137/81    Less than 140/90 Yes   Dilated retinal exam : 03/08/2024 Annually Yes   Foot exam   : 02/16/2023 Annually Yes     Review of Systems   Constitutional:  Positive for appetite change (decreased). Negative for activity change.   Gastrointestinal:  Positive for constipation and diarrhea. Negative for abdominal pain, nausea and vomiting.        Hx of IBS, GERD   Endocrine: Positive for polydipsia. Negative for polyphagia and polyuria.   Musculoskeletal:  Positive for arthralgias.   Neurological:  Negative for syncope and weakness.        Neuropathy   Psychiatric/Behavioral:  Negative for confusion.         Depression         Objective:      Physical Exam  Constitutional:       General: She is not in acute distress.     Appearance: She is not ill-appearing.   Neck:      Thyroid: Mass: neg of self exam.   Neurological:      Mental Status: She is alert.   Psychiatric:         Speech: Speech normal.         Assessment:       1. Type 2 diabetes mellitus with other specified complication, unspecified whether long term insulin use    2. Dyslipidemia associated with type 2 diabetes mellitus    3. Hypertension complicating diabetes                Plan:   Type 2 diabetes mellitus with other specified complication, unspecified whether long term insulin use    Dyslipidemia associated with type 2 diabetes mellitus    Hypertension complicating diabetes             PLAN:   - Condition: suboptimal control   - Monitor blood glucose 4x daily. Goals reviewed  - She is working with the RD  - BP and LDL- Recommended lifestyle modifications  for management. Encouraged healthy low fat, low carb diet and increase physical activity  - Pump Changes: No changes  - Continue to see the psychiatrist for depression (causing reduced appetite); discussed coping techniques  - The patient was explained the above plan and given opportunity to ask questions.  She understands, chooses and consents to this plan and accepts all the risks, which include but are not limited to the risks mentioned above.   - Labs ordered as above  - Nurse visit:  deferred    - Follow up: 1 month and 3 months      OP5  Novolog  Basal 0.35 Units/hr  Insulin : Carb Ratios 24 g/Unit  Sensitivity (ISF, Correction) 89 mg/dL  BG Target Range 120 mg/dL (+0/-0)  BG Correction Threshold 130 mg/dL      For pump failure:  Daily ss  If blood pre-meal sugar is  take 4 units of Novolog;  If blood pre-meal sugar is 151-200 add +2 units of Novolog;  If blood pre-meal sugar is 201-250 add +4 units of Novolog;  If blood pre-meal sugar is 251-300 add +6 units of Novolog;  If blood pre-meal sugar is 301-350 add  +8 units of Novolog;  If blood pre-meal sugar is 351-400+ add  +10 units of Novolog;      Correction scale (for steroid use) :  Novolog every 3 hours using correction scale outside of mealtime.  -200: 2 units  -250: 4 units  -300: 6 units  -350: 8 units  BG greater than 350: 10 units      A total of 30 minutes was spent in face to face time, of which over 50% was spent in counseling patient on disease process, complications, treatment, and side effects of medications.

## 2024-04-04 NOTE — DISCHARGE SUMMARY
[facility]  Discharge Note  Short Stay    Procedure(s) (LRB):  COLONOSCOPY (N/A)      OUTCOME: Patient tolerated treatment/procedure well without complication and is now ready for discharge.    DISPOSITION: Home or Self Care    FINAL DIAGNOSIS:  Colon polyps, diverticula and hemorrhoids    FOLLOWUP: With primary care provider    DISCHARGE INSTRUCTIONS:    Discharge Procedure Orders   Diet general         Clinical Reference Documents Added to Patient Instructions         Document    COLON POLYPECTOMY DISCHARGE INSTRUCTIONS (ENGLISH)    DIVERTICULOSIS DISCHARGE INSTRUCTIONS (ENGLISH)            TIME SPENT ON DISCHARGE: 10 minutes

## 2024-04-04 NOTE — TRANSFER OF CARE
"Anesthesia Transfer of Care Note    Patient: Yolanda Betts    Procedure(s) Performed: Procedure(s) (LRB):  COLONOSCOPY (N/A)    Patient location: GI    Anesthesia Type: MAC    Transport from OR: Transported from OR on room air with adequate spontaneous ventilation    Post pain: adequate analgesia    Post assessment: no apparent anesthetic complications    Post vital signs: stable    Level of consciousness: responds to stimulation    Nausea/Vomiting: no nausea/vomiting    Complications: none    Transfer of care protocol was followed      Last vitals: Visit Vitals  BP (!) 141/81 (BP Location: Left arm, Patient Position: Lying)   Pulse 78   Temp 36.3 °C (97.3 °F) (Temporal)   Resp 14   Ht 4' 8" (1.422 m)   SpO2 99%   Breastfeeding No   BMI 38.79 kg/m²     "

## 2024-04-05 VITALS
HEIGHT: 56 IN | DIASTOLIC BLOOD PRESSURE: 81 MMHG | HEART RATE: 80 BPM | BODY MASS INDEX: 38.79 KG/M2 | SYSTOLIC BLOOD PRESSURE: 137 MMHG | TEMPERATURE: 97 F | RESPIRATION RATE: 17 BRPM | OXYGEN SATURATION: 99 %

## 2024-04-08 LAB
FINAL PATHOLOGIC DIAGNOSIS: NORMAL
GROSS: NORMAL
Lab: NORMAL

## 2024-04-08 NOTE — ANESTHESIA POSTPROCEDURE EVALUATION
Anesthesia Post Evaluation    Patient: Yolanda Betts    Procedure(s) Performed: Procedure(s) (LRB):  COLONOSCOPY (N/A)    Final Anesthesia Type: MAC      Patient location during evaluation: GI PACU  Patient participation: Yes- Able to Participate  Level of consciousness: awake and alert  Post-procedure vital signs: reviewed and stable  Pain management: adequate  Airway patency: patent    PONV status at discharge: No PONV  Anesthetic complications: no      Cardiovascular status: hemodynamically stable  Respiratory status: unassisted, room air and spontaneous ventilation  Hydration status: euvolemic  Follow-up not needed.              Vitals Value Taken Time   /81 04/04/24 0945   Temp 36.1 °C (97 °F) 04/04/24 0933   Pulse 80 04/04/24 0945   Resp 17 04/04/24 0945   SpO2 99 % 04/04/24 0945         Event Time   Out of Recovery 10:05:57         Pain/Elif Score: No data recorded

## 2024-04-12 ENCOUNTER — PATIENT MESSAGE (OUTPATIENT)
Dept: ADMINISTRATIVE | Facility: HOSPITAL | Age: 54
End: 2024-04-12
Payer: MEDICAID

## 2024-04-15 ENCOUNTER — TELEPHONE (OUTPATIENT)
Dept: RHEUMATOLOGY | Facility: CLINIC | Age: 54
End: 2024-04-15
Payer: MEDICAID

## 2024-04-15 DIAGNOSIS — M45.9 ANKYLOSING SPONDYLITIS, UNSPECIFIED SITE OF SPINE: Primary | ICD-10-CM

## 2024-04-15 DIAGNOSIS — M53.3 SCLEROSIS OF SACROILIAC JOINT: ICD-10-CM

## 2024-04-15 NOTE — TELEPHONE ENCOUNTER
Outgoing call to patient to coordinate nurse visit for steroid injection. Patient will obtain transportation and call back.

## 2024-04-15 NOTE — TELEPHONE ENCOUNTER
----- Message from Milagros Irene sent at 4/15/2024  7:53 AM CDT -----  Contact: pt  Pt is calling in rgd to having a severe back pain, and pt would like to come in to get an injection.  Please call her back at [phone]  isamar/[email or web address]

## 2024-04-17 ENCOUNTER — CLINICAL SUPPORT (OUTPATIENT)
Dept: RHEUMATOLOGY | Facility: CLINIC | Age: 54
End: 2024-04-17
Payer: MEDICAID

## 2024-04-17 ENCOUNTER — DOCUMENTATION ONLY (OUTPATIENT)
Dept: REHABILITATION | Facility: HOSPITAL | Age: 54
End: 2024-04-17
Payer: MEDICAID

## 2024-04-17 ENCOUNTER — PATIENT MESSAGE (OUTPATIENT)
Dept: RHEUMATOLOGY | Facility: CLINIC | Age: 54
End: 2024-04-17
Payer: MEDICAID

## 2024-04-17 DIAGNOSIS — R29.898 DECREASED GRIP STRENGTH: Primary | ICD-10-CM

## 2024-04-17 DIAGNOSIS — M45.9 ANKYLOSING SPONDYLITIS, UNSPECIFIED SITE OF SPINE: Primary | ICD-10-CM

## 2024-04-17 DIAGNOSIS — R52 PAIN: ICD-10-CM

## 2024-04-17 DIAGNOSIS — M53.3 SCLEROSIS OF SACROILIAC JOINT: ICD-10-CM

## 2024-04-17 DIAGNOSIS — Z78.9 DECREASED ACTIVITIES OF DAILY LIVING (ADL): ICD-10-CM

## 2024-04-17 PROCEDURE — 96372 THER/PROPH/DIAG INJ SC/IM: CPT | Mod: PBBFAC | Performed by: PHARMACIST

## 2024-04-17 PROCEDURE — 99999PBSHW PR PBB SHADOW TECHNICAL ONLY FILED TO HB: Mod: PBBFAC,,,

## 2024-04-17 PROCEDURE — 99213 OFFICE O/P EST LOW 20 MIN: CPT | Mod: PBBFAC

## 2024-04-17 PROCEDURE — 99999 PR PBB SHADOW E&M-EST. PATIENT-LVL III: CPT | Mod: PBBFAC,,,

## 2024-04-17 RX ORDER — TRIAMCINOLONE ACETONIDE 40 MG/ML
40 INJECTION, SUSPENSION INTRA-ARTICULAR; INTRAMUSCULAR ONCE
Status: COMPLETED | OUTPATIENT
Start: 2024-04-17 | End: 2024-04-17

## 2024-04-17 RX ADMIN — TRIAMCINOLONE ACETONIDE 40 MG: 40 INJECTION, SUSPENSION INTRA-ARTICULAR; INTRAMUSCULAR at 12:04

## 2024-04-17 NOTE — PROGRESS NOTES
After obtaining consent, and per orders of Dr. Jackson, injection of triamcinolone acetonide 40 mg/1 mL given in right gluteal muscle by Tierra Galan. Patient instructed to remain in clinic for 20 minutes afterwards, and to report any adverse reaction to me immediately.     Triamcinolone acetonide injectable suspension 40 mg/1 mL.   NDC: 75172-742-79   Lot: [id number]  Exp: 12/31/2024     Thank you for allowing us to participate in this patient's care.

## 2024-04-17 NOTE — PROGRESS NOTES
Name: Yolanda Betts    Visit Date: 4/17/2024    Reason for missed visit: Cancelled due to pain      Plan: Therapist spoke with patient to remind patient of cancellation policy. Patient reports she has been cancelling due to pain. She is receiving a shot today and if she cannot make her next scheduled appointment she will cancel her remaining appointments until her pain is controlled and she can attend again.

## 2024-04-19 ENCOUNTER — TELEPHONE (OUTPATIENT)
Dept: RHEUMATOLOGY | Facility: CLINIC | Age: 54
End: 2024-04-19
Payer: MEDICAID

## 2024-04-19 DIAGNOSIS — F25.0 SCHIZOAFFECTIVE DISORDER, BIPOLAR TYPE: Chronic | ICD-10-CM

## 2024-04-19 RX ORDER — SECUKINUMAB 150 MG/ML
300 INJECTION SUBCUTANEOUS
Qty: 4 ML | Refills: 3 | Status: ACTIVE | OUTPATIENT
Start: 2024-04-19

## 2024-04-19 RX ORDER — CARIPRAZINE 1.5 MG/1
1.5 CAPSULE, GELATIN COATED ORAL DAILY
Qty: 30 CAPSULE | Refills: 0 | OUTPATIENT
Start: 2024-04-19 | End: 2024-05-19

## 2024-04-19 NOTE — TELEPHONE ENCOUNTER
Outgoing call to return patient's message. Plan to d/w provider Cosentyx dosing interval change or alternative therapy.

## 2024-04-22 ENCOUNTER — PATIENT MESSAGE (OUTPATIENT)
Dept: DIABETES | Facility: CLINIC | Age: 54
End: 2024-04-22
Payer: MEDICAID

## 2024-04-22 ENCOUNTER — TELEPHONE (OUTPATIENT)
Dept: DIABETES | Facility: CLINIC | Age: 54
End: 2024-04-22
Payer: MEDICAID

## 2024-04-22 NOTE — TELEPHONE ENCOUNTER
Spoke with pt. Assisted with pump setting adjustment per josue Levine. Will check status tomorrow. Pt verbalized understanding.

## 2024-04-22 NOTE — TELEPHONE ENCOUNTER
----- Message from Angie Baker sent at 4/22/2024  7:16 AM CDT -----  Type:  Patient Advice     Who Called:pt   Who Left Message for Patient:Yara   Does the patient know what this is regarding?:Insulin Pump   Would the patient rather a call back or a response via [facility]? Call   Best Call Back Number: [phone]  Additional Information:      Pt stated she Is requesting a call back her insulin pump is not working correctly

## 2024-04-22 NOTE — TELEPHONE ENCOUNTER
Spoke with pt. She reports Omni5 switching her from automated to manual mode due to basal pausing for extended period. She didn't realize pump would function in manual mode and has been changing out Pods to troubleshoot.     Reviewed to keep Pod in place using manual mode and bolusing at meals and for BG correction dosing. Discussed that I'd message Abner for pump rate eval and contact her back to assist with any changes. Pt verbalized understanding. Successful outreach.

## 2024-04-23 ENCOUNTER — TELEPHONE (OUTPATIENT)
Dept: DIABETES | Facility: CLINIC | Age: 54
End: 2024-04-23
Payer: MEDICAID

## 2024-04-24 ENCOUNTER — TELEPHONE (OUTPATIENT)
Dept: DIABETES | Facility: CLINIC | Age: 54
End: 2024-04-24
Payer: MEDICAID

## 2024-04-24 NOTE — TELEPHONE ENCOUNTER
Spoke with pt. Reviewed Omni5 reports. She reports Dexcom running well but Omni5 continuing with freq manual mode changes due to low BG pattern.     Reviewed [facility] reports w/ pt and saved media. Message to provider for additional feedback. Since pt on so little insulin per day, she may benefit from treatment change. Message to provider Abner.

## 2024-04-30 DIAGNOSIS — K76.0 FATTY LIVER: Primary | ICD-10-CM

## 2024-05-02 ENCOUNTER — OFFICE VISIT (OUTPATIENT)
Dept: DIABETES | Facility: CLINIC | Age: 54
End: 2024-05-02
Payer: MEDICAID

## 2024-05-02 DIAGNOSIS — E11.69 TYPE 2 DIABETES MELLITUS WITH OTHER SPECIFIED COMPLICATION, UNSPECIFIED WHETHER LONG TERM INSULIN USE: Primary | ICD-10-CM

## 2024-05-02 DIAGNOSIS — Z59.41 FOOD INSECURITY: Primary | ICD-10-CM

## 2024-05-02 DIAGNOSIS — E11.69 DYSLIPIDEMIA ASSOCIATED WITH TYPE 2 DIABETES MELLITUS: ICD-10-CM

## 2024-05-02 DIAGNOSIS — E11.59 HYPERTENSION COMPLICATING DIABETES: ICD-10-CM

## 2024-05-02 DIAGNOSIS — E78.5 DYSLIPIDEMIA ASSOCIATED WITH TYPE 2 DIABETES MELLITUS: ICD-10-CM

## 2024-05-02 DIAGNOSIS — I15.2 HYPERTENSION COMPLICATING DIABETES: ICD-10-CM

## 2024-05-02 PROCEDURE — 99214 OFFICE O/P EST MOD 30 MIN: CPT | Mod: 95,,, | Performed by: NURSE PRACTITIONER

## 2024-05-02 PROCEDURE — 1160F RVW MEDS BY RX/DR IN RCRD: CPT | Mod: CPTII,95,, | Performed by: NURSE PRACTITIONER

## 2024-05-02 PROCEDURE — 1159F MED LIST DOCD IN RCRD: CPT | Mod: CPTII,95,, | Performed by: NURSE PRACTITIONER

## 2024-05-02 PROCEDURE — 4010F ACE/ARB THERAPY RXD/TAKEN: CPT | Mod: CPTII,95,, | Performed by: NURSE PRACTITIONER

## 2024-05-02 SDOH — SOCIAL DETERMINANTS OF HEALTH (SDOH): FOOD INSECURITY: Z59.41

## 2024-05-02 NOTE — PROGRESS NOTES
Subjective:         Patient ID: Yolanda Betts is a 53 y.o. female.  Patient's current PCP is Sasha Sorenson MD.       Chief Complaint: No chief complaint on file.      HPI  Yolanda Betts is a 53 y.o. Black or  female presenting for a follow up for diabetes. Patient has been diagnosed with diabetes for > 10 years. On 4/24 pt was contacted and advised to stop the Omnipod due to frequent low BG, pump suspension. She was advised to only take Novolog using IC 20. She tried this method which worked, but decided to resume the pump. She reports that since changing the pod and site, it is working better.    Complications related to diabetes:  HTN, Hyperlipidemia, peripheral neuropathy; denies Pancreatitis; denies Gastroparesis; denies DKA; denies Hx/family Hx of MEN2/MTC; reports Frequent UTIs/yeast infections; Bariatric surgery 6/1/21. IBS- diarrhea/constipation    Past failed treatment include:  Jardiance- multiple yeast infections; Victoza - GI upset. GLP-1- not advised due to GI issues (per GI and DM management), Metformin- low BGs    Blood glucose testing is performed regularly with her Dexcom.    Compliance:The patient reports medication and diet compliance most of the time.       The patient location is: home, [place]  The chief complaint leading to consultation is: DM follow up    Visit type: audiovisual    Face to Face time with patient: 30 minutes of total time spent on the encounter, which includes face to face time and non-face to face time preparing to see the patient (eg, review of tests), Obtaining and/or reviewing separately obtained history, Documenting clinical information in the electronic or other health record, Independently interpreting results (not separately reported) and communicating results to the patient/family/caregiver, or Care coordination (not separately reported). Each patient to whom he or she provides medical services by telemedicine is:  (1) informed of the  relationship between the physician and patient and the respective role of any other health care provider with respect to management of the patient; and (2) notified that he or she may decline to receive medical services by telemedicine and may withdraw from such care at any time.         //   , There is no height or weight on file to calculate BMI.  Her blood sugar in clinic today is:   Lab Results   Component Value Date    POCGLU 78 03/10/2023       Labs reviewed and are noted below.  Her most recent A1C is:  Lab Results   Component Value Date    HGBA1C 6.5 (H) 11/29/2023    HGBA1C 5.6 08/08/2023    HGBA1C 6.3 (H) 01/13/2023     Lab Results   Component Value Date    CPEPTIDE 4.56 02/26/2018     Lab Results   Component Value Date    GLUTAMICACID 0.00 02/26/2018     Glucose   Date Value Ref Range Status   03/11/2024 94 70 - 110 mg/dL Final     Anion Gap   Date Value Ref Range Status   03/11/2024 5 (L) 8 - 16 mmol/L Final     eGFR if    Date Value Ref Range Status   03/08/2022 >60.0 >60 mL/min/1.73 m^2 Final     eGFR if non    Date Value Ref Range Status   03/08/2022 >60.0 >60 mL/min/1.73 m^2 Final     Comment:     Calculation used to obtain the estimated glomerular filtration  rate (eGFR) is the CKD-EPI equation.          CURRENT DM MEDICATIONS:   Diabetes Medications               glucagon (BAQSIMI) 3 mg/actuation Spry 1 spray by Nasal route as needed (hypoglycemia). For emergency use. May repeat 1 time after 15 minutes    insulin aspart U-100 (NOVOLOG FLEXPEN U-100 INSULIN) 100 unit/mL (3 mL) InPn pen Inject 24 Units into the skin 3 (three) times daily before meals. Use per sliding scale for breakfast and dinner    insulin aspart U-100 (NOVOLOG U-100 INSULIN ASPART) 100 unit/mL injection Inject 100 Units into the skin Every 3 (three) days. To use via insulin pump                   Diabetes Management Status    Statin: Taking  ACE/ARB: Taking    Screening or Prevention Patient's value  Goal Complete/Controlled?   HgA1C Testing and Control   Lab Results   Component Value Date    HGBA1C 6.5 (H) 11/29/2023      Annually/Less than 8% Yes   Lipid profile : 04/29/2024 Annually Yes   LDL control Lab Results   Component Value Date    LDLCALC 123 (H) 04/29/2024    Annually/Less than 100 mg/dl  Yes   Nephropathy screening Lab Results   Component Value Date    LABMICR 22.0 12/11/2023     Lab Results   Component Value Date    PROTEINUA Trace (A) 01/23/2024    Annually Yes   Blood pressure BP Readings from Last 1 Encounters:   04/04/24 137/81    Less than 140/90 Yes   Dilated retinal exam : 03/08/2024 Annually Yes   Foot exam   : 02/16/2023 Annually Yes     Review of Systems   Constitutional:  Positive for appetite change (decreased). Negative for activity change.   Gastrointestinal:  Positive for constipation and diarrhea. Negative for abdominal pain, nausea and vomiting.        Hx of IBS, GERD   Endocrine: Positive for polydipsia. Negative for polyphagia and polyuria.   Musculoskeletal:  Positive for arthralgias.   Neurological:  Negative for syncope and weakness.        Neuropathy   Psychiatric/Behavioral:  Negative for confusion.         Depression         Objective:      Physical Exam  Constitutional:       General: She is not in acute distress.     Appearance: She is not ill-appearing.   Neck:      Thyroid: Mass: neg of self exam.   Neurological:      Mental Status: She is alert.   Psychiatric:         Speech: Speech normal.         Assessment:       1. Type 2 diabetes mellitus with other specified complication, unspecified whether long term insulin use    2. Dyslipidemia associated with type 2 diabetes mellitus    3. Hypertension complicating diabetes                  Plan:   Type 2 diabetes mellitus with other specified complication, unspecified whether long term insulin use    Dyslipidemia associated with type 2 diabetes mellitus    Hypertension complicating diabetes               PLAN:   - Condition:  suboptimal control   - Monitor blood glucose 4x daily. Goals reviewed  - She is working with the RD  - BP and LDL- Recommended lifestyle modifications for management. Encouraged healthy low fat, low carb diet and increase physical activity  - Pump Changes: No changes, continue pump- pt preference   - Continue to see the psychiatrist for depression (causing reduced appetite); discussed coping techniques  - The patient was explained the above plan and given opportunity to ask questions.  She understands, chooses and consents to this plan and accepts all the risks, which include but are not limited to the risks mentioned above.   - Labs ordered as above  - Nurse visit:  deferred    - Follow up: 2 months and 3 months      OP5  Novolog  Basal 0.35 Units/hr  Insulin : Carb Ratios 24 g/Unit  Sensitivity (ISF, Correction) 89 mg/dL  BG Target Range 120 mg/dL (+0/-0)  BG Correction Threshold 130 mg/dL      For pump failure:  Use Novolog IC 20 (basal not needed in the past)    Correction scale (for steroid use) :  Novolog every 3 hours using correction scale outside of mealtime.  -200: 2 units  -250: 4 units  -300: 6 units  -350: 8 units  BG greater than 350: 10 units      A total of 30 minutes was spent in face to face time, of which over 50% was spent in counseling patient on disease process, complications, treatment, and side effects of medications.

## 2024-05-06 ENCOUNTER — TELEPHONE (OUTPATIENT)
Dept: PSYCHIATRY | Facility: CLINIC | Age: 54
End: 2024-05-06
Payer: MEDICAID

## 2024-05-07 ENCOUNTER — TELEPHONE (OUTPATIENT)
Dept: PSYCHIATRY | Facility: CLINIC | Age: 54
End: 2024-05-07
Payer: MEDICAID

## 2024-05-07 NOTE — TELEPHONE ENCOUNTER
----- Message from Carmen Choi sent at 5/7/2024 11:11 AM CDT -----  Regarding: virtual  Name of who is calling:   Yolanda      What is the request in detail: Pt is requesting a call back in ref to changing today's visit to a virtual appt at 2pm today / please call to confirm      Can the clinic reply by [facility]:yes      What number to call back if not [facility]: [phone]

## 2024-05-08 ENCOUNTER — OFFICE VISIT (OUTPATIENT)
Dept: PSYCHIATRY | Facility: CLINIC | Age: 54
End: 2024-05-08
Payer: MEDICAID

## 2024-05-08 DIAGNOSIS — F41.1 GENERALIZED ANXIETY DISORDER: Chronic | ICD-10-CM

## 2024-05-08 DIAGNOSIS — F25.0 SCHIZOAFFECTIVE DISORDER, BIPOLAR TYPE: Primary | Chronic | ICD-10-CM

## 2024-05-08 PROCEDURE — 4010F ACE/ARB THERAPY RXD/TAKEN: CPT | Mod: CPTII,95,, | Performed by: PSYCHOLOGIST

## 2024-05-08 PROCEDURE — 99215 OFFICE O/P EST HI 40 MIN: CPT | Mod: HP,HB,95, | Performed by: PSYCHOLOGIST

## 2024-05-08 PROCEDURE — 1159F MED LIST DOCD IN RCRD: CPT | Mod: CPTII,95,, | Performed by: PSYCHOLOGIST

## 2024-05-08 RX ORDER — LAMOTRIGINE 200 MG/1
200 TABLET ORAL 2 TIMES DAILY
Qty: 60 TABLET | Refills: 2 | Status: SHIPPED | OUTPATIENT
Start: 2024-05-08 | End: 2024-08-06

## 2024-05-08 RX ORDER — ALPRAZOLAM 1 MG/1
1 TABLET ORAL 2 TIMES DAILY PRN
Qty: 60 TABLET | Refills: 2 | Status: SHIPPED | OUTPATIENT
Start: 2024-05-08 | End: 2024-08-06

## 2024-05-08 RX ORDER — CARIPRAZINE 1.5 MG/1
1.5 CAPSULE, GELATIN COATED ORAL DAILY
Qty: 7 CAPSULE | Refills: 0 | Status: SHIPPED | OUTPATIENT
Start: 2024-05-08 | End: 2024-06-12 | Stop reason: ALTCHOICE

## 2024-05-08 NOTE — LETTER
May 8, 2024        Feroz Stanley LCSW   [facility]   Ph: [phone]  Fax: [phone]             [facility] [address]  [address] [facility] [address] [facility] [address]  Phone: [phone]  Fax: [phone]   Patient: Yolanda Betts   MR Number: [id number]   YOB: 1970   Date of Visit: 5/8/2024     Dear Mr. Stanley,     I saw Yolanda today for follow-up.  We are making a medication adjustment, though I have again requested that she attend an IOP.  Please see attached, and let me know if you have any questions or need more information.  I appreciate following her along with you.    Sincerely,    Mariam Young, PhD, MPAP  Advanced Practice Medical Psychologist          [place]

## 2024-05-08 NOTE — PATIENT INSTRUCTIONS

## 2024-05-08 NOTE — PROGRESS NOTES
"Outpatient Psychiatry Follow-Up Visit    5/8/2024      Virtual Visit    The patient location is: Patient's home/ Patient reported that his/her location at the time of this visit was in the [facility]     Visit type: Virtual visit with synchronous audio and video     Each patient to whom he or she provides medical services by telehealth is: (1) informed of the relationship between the medical psychologist and patient and the respective role of any other health care provider with respect to management of the patient; and (2) notified that he or she may decline to receive medical services by telehealth and may withdraw from such care at any time.    I also informed patient of the following:   Mariam Young, PhD, MPAP:  [place] medical license number: [id number]    My contact info:  [facility] / 2nd Floor  [address] [place] [address]   Ph: [phone]    If technology issues, call office phone: Ph: [phone] or [phone]  If crisis: Dial 911 or go to nearest Emergency Room (ER)  If questions related to privacy practices: contact [facility] Information Department: [phone]    Chief Complaint:  Yolanda Betts is a 53 y.o. female who presents today for follow-up of mood and anxiety.       LAST VISIT: Yolanda attended her virtual visit. She said that she has had increased anxiety. She asked for something for "breakthrough anxiety." She said that she feels like someone is sitting on her chest, is shaking, can't concentrate, feels "immobile or paralyzed," feels fearful in "flight or fright mode." She said that it happens all day long. She feels anxious all the time. She has seen cardiology--no underlying physical cause. She is depressed--"I don't know how to get out of it." She feels down and little numb. We discussed grounding--she reported not having ice (no  or trays) and is unable to try ice on the inside of her wrist--also doing something " "physical to "reset" her system when highly panicked. We had made some medicine changes last visit--we can increase both her mirtazepine and Vraylar. The mirtazepine and Cymbalta combination can raise seratonin--will need to monitor this--but we hope that the increase will help her anxiety. She denied any involuntary movements, and none were observed during this relatively brief virtual visit. She is willing to consider IOP again--needs a higher functioning group. We will reach out to case management to help with referring to one with her insurance. See plan below.    Plan--continue Lamictal 200 mg bid; Xanax 1 mg bid; increase mirtazepine 30 mg hs; Vraylar 4.5 mg (add 1.5 mg to her 3 mg); takes Cymbalta 60 mg bid from another provider; exercise/movement daily; IOP    CURRENT PRESENTATION: Yolanda attended her virtual visit. She reported that she thinks that she is either manic, depressed, or a combination of both. She reported that she has been shopping and spending money even though she does not have it. She said that she has been doing that for about the past 3 weeks--clearance items so can't return any of it. She has not been in an IOP--"it's been awhile." She reported that she had been referred to [facility] but has not gone because she does not want to leave her house. She reported having higher anxiety, panic attacks, "I just want to be at home." When asked about sleep, she said "a little bit. I wake up generally around 4 or 5 and can't go back." She is going to bed around 10 or 11--getting about 6 hours of sleep. She reported that she still has hallucinations "always" even when not depressed. She does still see her therapist virtually on a weekly basis.  We discussed changing from Vraylar to Caplyta and her attending an IOP.  She agreed to call today to [facility], which is where she had previously been referred.  She also agreed that she would start the program next week.  See plan below.    Plan--continue Lamictal " 200 mg bid; Xanax 1 mg bid; mirtazepine 30 mg hs; decrease Vraylar 1.5 mg for one week, while starting Caplyta 42 mg, then stop Vraylar and continue on Caplyta 42 mg; takes Cymbalta 60 mg bid from another provider; exercise/movement daily; IOP next week at latest (call today)     since March 2024.        ----------------------------------------------------------------------------------------------------------  Therapist: Feroz Stanley LCSW with [facility]   Ph: [phone]  Fax: [phone]    Prior medicines: Prozac, Wellbutrin, Paxil, Lamictal, Lexapro, Celexa, Cymbalta, Remeron, Abilify, Seroquel (raised blood sugars), Risperdal, Latuda, Restoril, Ambien (groggy), Ambien CR, trazodone, Xanax, clonazepam        5/8/2024     1:50 PM 3/22/2024     9:44 AM 9/14/2023     1:01 PM   GAD7   1. Feeling nervous, anxious, or on edge? 3 3 3   2. Not being able to stop or control worrying? 3 3 3   3. Worrying too much about different things? 3 3 3   4. Trouble relaxing? 3 3 3   5. Being so restless that it is hard to sit still? 3 3 3   6. Becoming easily annoyed or irritable? 3 3 3   7. Feeling afraid as if something awful might happen? 3 3 3   JOANN-7 Score 21 21 21      0-4 = Minimal anxiety  5-9 = Mild anxiety  10-14 = Moderate anxiety  15-21 = Severe anxiety       Review of Systems   PSYCHIATRIC: Pertinant items are noted in the narrative.    Past Medical, Family and Social History: The patient's past medical, family and social history have been reviewed and updated as appropriate within the electronic medical record - see encounter notes.      Current Outpatient Medications:     albuterol (PROVENTIL) 2.5 mg /3 mL (0.083 %) nebulizer solution, Take 2.5 mg by nebulization every 4 (four) hours as needed. Times a day, Disp: 75 mL, Rfl: 1    albuterol (PROVENTIL/VENTOLIN HFA) 90 mcg/actuation inhaler, Inhale 2 puffs into the lungs every 6 (six) hours as needed for Wheezing., Disp: 8.5 g, Rfl: 1     "ALPRAZolam (XANAX) 1 MG tablet, Take 1 tablet (1 mg total) by mouth 2 (two) times daily as needed for Anxiety., Disp: 60 tablet, Rfl: 2    amlodipine-valsartan (EXFORGE)  mg per tablet, Take 1 tablet by mouth once daily., Disp: 90 tablet, Rfl: 3    amlodipine-valsartan (EXFORGE)  mg per tablet, Take 1 tablet by mouth in the morning., Disp: 90 tablet, Rfl: 1    atorvastatin (LIPITOR) 20 MG tablet, Take 1 tablet (20 mg total) by mouth every evening, Disp: 90 tablet, Rfl: 3    BD INSULIN SYRINGE ULTRA-FINE 1/2 mL 30 gauge x 1/2" Syrg, , Disp: , Rfl: 0    blood-glucose sensor (DEXCOM G6 SENSOR) Mariela, 1 each by Misc.(Non-Drug; Combo Route) route every 10 days., Disp: 3 each, Rfl: 11    blood-glucose sensor (DEXCOM G7 SENSOR) Mariela, Use to check bg. Change every 10 days, Disp: 3 each, Rfl: 11    blood-glucose transmitter (DEXCOM G6 TRANSMITTER) Mariela, 1 each by Misc.(Non-Drug; Combo Route) route every 3 (three) months., Disp: 1 each, Rfl: 3    cariprazine (VRAYLAR) 4.5 mg Cap, Take 1 capsule (4.5 mg total) by mouth once daily., Disp: 30 capsule, Rfl: 1    celecoxib (CELEBREX) 100 MG capsule, Take 1 capsule (100 mg total) by mouth 2 (two) times daily., Disp: 60 capsule, Rfl: 2    cyclobenzaprine (FLEXERIL) 10 MG tablet, Take 1 tablet (10 mg total) by mouth 3 (three) times daily as needed (Pain)., Disp: 90 tablet, Rfl: 11    DULoxetine (CYMBALTA) 60 MG capsule, Take 1 capsule (60 mg total) by mouth 2 (two) times daily., Disp: 180 capsule, Rfl: 3    ergocalciferol (VITAMIN D2) 50,000 unit Cap, Take 1 capsule twice weekly for 3 weeks then weekly, Disp: 12 capsule, Rfl: 3    estradioL (ESTRACE) 0.01 % (0.1 mg/gram) vaginal cream, Place 1 g vaginally twice a week., Disp: 42.5 g, Rfl: 3    fenofibrate (TRICOR) 145 MG tablet, Take 1 tablet (145 mg total) by mouth once daily., Disp: 90 tablet, Rfl: 3    fluticasone propionate (FLONASE) 50 mcg/actuation nasal spray, 2 sprays (100 mcg total) by Each Nare route once daily., " "Disp: 16 g, Rfl: 11    frovatriptan (FROVA) 2.5 MG tablet, Take 1 tablet at onset of acute migraine. May take 1 more tablet 2 hours later if needed. (MAX 2 TABLET PER DAY. MAX 4 TABLETS PER WEEK.), Disp: 9 tablet, Rfl: 1    frovatriptan (FROVA) 2.5 MG tablet, Take 1 tablet by mouth as needed for Migraine for up to 180 days. If recurs, may repeat after 2 hours. Max of 3 tabs in 24 hours., Disp: 10 tablet, Rfl: 2    furosemide (LASIX) 20 MG tablet, Take 1 tablet (20 mg total) by mouth once daily., Disp: 90 tablet, Rfl: 3    glucagon (BAQSIMI) 3 mg/actuation Spry, 1 spray by Nasal route as needed (hypoglycemia). For emergency use. May repeat 1 time after 15 minutes, Disp: 2 each, Rfl: 1    insulin aspart U-100 (NOVOLOG FLEXPEN U-100 INSULIN) 100 unit/mL (3 mL) InPn pen, Inject 24 Units into the skin 3 (three) times daily before meals. Use per sliding scale for breakfast and dinner, Disp: 45 mL, Rfl: 1    insulin aspart U-100 (NOVOLOG U-100 INSULIN ASPART) 100 unit/mL injection, Inject 100 Units into the skin Every 3 (three) days. To use via insulin pump, Disp: 10 mL, Rfl: 5    insulin pump cart,automated,BT (OMNIPOD 5 G6 PODS, GEN 5,) Crtg, Inject 1 each into the skin Every 3 (three) days., Disp: 30 each, Rfl: 3    insulin syringe-needle U-100 0.3 mL 30 gauge x 5/16" Syrg, 1 each by Misc.(Non-Drug; Combo Route) route Every 3 (three) days., Disp: 100 each, Rfl: 2    lamoTRIgine (LAMICTAL) 200 MG tablet, Take 1 tablet (200 mg total) by mouth 2 (two) times daily., Disp: 60 tablet, Rfl: 2    levocetirizine (XYZAL) 5 MG tablet, Take 1 tablet (5 mg total) by mouth every evening., Disp: 30 tablet, Rfl: 11    lifitegrast (XIIDRA) 5 % Dpet, Place 1 drop into both eyes 2 (two) times daily., Disp: 60 each, Rfl: 12    metoprolol succinate (TOPROL-XL) 25 MG 24 hr tablet, Take 1 tablet (25 mg total) by mouth once daily., Disp: 90 tablet, Rfl: 3    metoprolol succinate (TOPROL-XL) 25 MG 24 hr tablet, Take 1 tablet by mouth in the " "morning., Disp: 90 tablet, Rfl: 1    mirtazapine (REMERON) 30 MG tablet, Take 1 tablet (30 mg total) by mouth every evening., Disp: 30 tablet, Rfl: 1    omeprazole (PRILOSEC) 40 MG capsule, Take 1 capsule (40 mg total) by mouth once daily., Disp: 90 capsule, Rfl: 3    pen needle, diabetic (BD ULTRA-FINE MINI PEN NEEDLE) 31 gauge x 3/16" Ndle, Use one needle 5 times a day, Disp: 200 each, Rfl: 11    pen needle, diabetic (BD ULTRA-FINE MINI PEN NEEDLE) 31 gauge x 3/16" Ndle, Use 5 a day, Disp: 200 each, Rfl: 11    polyethylene glycol (GLYCOLAX) 17 gram/dose powder, Take 17 g by mouth once daily., Disp: 1530 g, Rfl: 3    secukinumab (COSENTYX PEN, 2 PENS,) 150 mg/mL PnIj, Inject 300 mg into the skin every 14 (fourteen) days., Disp: 4 mL, Rfl: 3    spironolactone (ALDACTONE) 25 MG tablet, Take 1 tablet (25 mg total) by mouth once daily., Disp: 90 tablet, Rfl: 3    spironolactone (ALDACTONE) 25 MG tablet, Take 1 tablet by mouth in the morning., Disp: 90 tablet, Rfl: 1    SYMBICORT 160-4.5 mcg/actuation HFAA, Inhale 2 puffs into the lungs in the morning and 2 puffs before bedtime. 2 puffs every 12 hours, Disp: 10.2 g, Rfl: 2  No current facility-administered medications for this visit.    Facility-Administered Medications Ordered in Other Visits:     ondansetron injection 4 mg, 4 mg, Intravenous, Once PRN, Otto Phillips MD    Compliance: partial    Side effects: see above    Risk Parameters:  Patient reports no suicidal ideation  Patient reports no homicidal ideation  Patient reports no self-injurious behavior  Patient reports no violent behavior    Exam (detailed: at least 9 elements; comprehensive: all 15 elements)   Constitutional  Vitals:  Most recent vital signs were reviewed.   Last 3 sets of Vitals        3/22/2024    12:06 PM 4/4/2024     8:17 AM 4/5/2024     1:30 PM   Vitals - 1 value per visit   SYSTOLIC 131 141    DIASTOLIC 82 81    Pulse 78 78    Temp  97.3 °F (36.3 °C)    Resp  14    SPO2  99 %    Weight " "(lb) 173     Weight (kg) 78.472     Height 4' 8" (1.422 m) 4' 8" (1.422 m)    BMI (Calculated) 38.8     Pain Score Eight  Zero          General:  age appropriate, casually dressed, neatly groomed, wearing glasses     Musculoskeletal  Muscle Strength/Tone:  no tremor, no tic   Gait & Station:  video visit     Psychiatric  Speech:  no latency; no press, soft   Behavior: wnl   Mood & Affect:  anxious, depressed  blunted   Thought Process:  normal and logical   Associations:  intact   Thought Content:  No change--reports hearing voices; depressed/passive thoughts   Insight:  has awareness of illness   Judgement: behavior is adequate to circumstances   Orientation:  grossly intact   Memory: intact for content of interview   Language: grossly intact   Attention Span & Concentration:  Grossly intact   Fund of Knowledge:  intact and appropriate to age and level of education     Assessment and Diagnosis   Status/Progress: Based on the examination today, the patient's problem(s) is/are treatment resistant and failing to respond as expected to treatment.  New problems have been presented today.   Co-morbidities and psychosocial stressors  are complicating management of the primary condition.  The working differential for this patient includes schizoaffective vs bipolar vs schizophrenia.     General Impression:     Encounter Diagnoses   Name Primary?    Schizoaffective disorder, bipolar type Yes    Generalized anxiety disorder        Intervention/Counseling/Treatment Plan   Medication Management: Discussed risks, benefits, and alternatives to treatment plan documented above with patient. I answered all patient questions related to this plan, and patient expressed understanding and agreement.   continue Lamictal 200 mg bid; Xanax 1 mg bid; mirtazepine 30 mg hs; decrease Vraylar 1.5 mg for one week, while starting Caplyta 42 mg, then stop Vraylar and continue on Caplyta 42 mg; takes Cymbalta 60 mg bid from another " "provider  Continue with therapy    exercise/movement daily  IOP next week at latest (call today)      Medication List with Changes/Refills   Current Medications    ALBUTEROL (PROVENTIL) 2.5 MG /3 ML (0.083 %) NEBULIZER SOLUTION    Take 2.5 mg by nebulization every 4 (four) hours as needed. Times a day    ALBUTEROL (PROVENTIL/VENTOLIN HFA) 90 MCG/ACTUATION INHALER    Inhale 2 puffs into the lungs every 6 (six) hours as needed for Wheezing.    ALPRAZOLAM (XANAX) 1 MG TABLET    Take 1 tablet (1 mg total) by mouth 2 (two) times daily as needed for Anxiety.    AMLODIPINE-VALSARTAN (EXFORGE)  MG PER TABLET    Take 1 tablet by mouth once daily.    AMLODIPINE-VALSARTAN (EXFORGE)  MG PER TABLET    Take 1 tablet by mouth in the morning.    ATORVASTATIN (LIPITOR) 20 MG TABLET    Take 1 tablet (20 mg total) by mouth every evening    BD INSULIN SYRINGE ULTRA-FINE 1/2 ML 30 GAUGE X 1/2" SYRG        BLOOD-GLUCOSE SENSOR (DEXCOM G6 SENSOR) NHI    1 each by Misc.(Non-Drug; Combo Route) route every 10 days.    BLOOD-GLUCOSE SENSOR (DEXCOM G7 SENSOR) NHI    Use to check bg. Change every 10 days    BLOOD-GLUCOSE TRANSMITTER (DEXCOM G6 TRANSMITTER) NHI    1 each by Misc.(Non-Drug; Combo Route) route every 3 (three) months.    CARIPRAZINE (VRAYLAR) 4.5 MG CAP    Take 1 capsule (4.5 mg total) by mouth once daily.    CELECOXIB (CELEBREX) 100 MG CAPSULE    Take 1 capsule (100 mg total) by mouth 2 (two) times daily.    CYCLOBENZAPRINE (FLEXERIL) 10 MG TABLET    Take 1 tablet (10 mg total) by mouth 3 (three) times daily as needed (Pain).    DULOXETINE (CYMBALTA) 60 MG CAPSULE    Take 1 capsule (60 mg total) by mouth 2 (two) times daily.    ERGOCALCIFEROL (VITAMIN D2) 50,000 UNIT CAP    Take 1 capsule twice weekly for 3 weeks then weekly    ESTRADIOL (ESTRACE) 0.01 % (0.1 MG/GRAM) VAGINAL CREAM    Place 1 g vaginally twice a week.    FENOFIBRATE (TRICOR) 145 MG TABLET    Take 1 tablet (145 mg total) by mouth once daily.    " "FLUTICASONE PROPIONATE (FLONASE) 50 MCG/ACTUATION NASAL SPRAY    2 sprays (100 mcg total) by Each Nare route once daily.    FROVATRIPTAN (FROVA) 2.5 MG TABLET    Take 1 tablet at onset of acute migraine. May take 1 more tablet 2 hours later if needed. (MAX 2 TABLET PER DAY. MAX 4 TABLETS PER WEEK.)    FROVATRIPTAN (FROVA) 2.5 MG TABLET    Take 1 tablet by mouth as needed for Migraine for up to 180 days. If recurs, may repeat after 2 hours. Max of 3 tabs in 24 hours.    FUROSEMIDE (LASIX) 20 MG TABLET    Take 1 tablet (20 mg total) by mouth once daily.    GLUCAGON (BAQSIMI) 3 MG/ACTUATION SPRY    1 spray by Nasal route as needed (hypoglycemia). For emergency use. May repeat 1 time after 15 minutes    INSULIN ASPART U-100 (NOVOLOG FLEXPEN U-100 INSULIN) 100 UNIT/ML (3 ML) INPN PEN    Inject 24 Units into the skin 3 (three) times daily before meals. Use per sliding scale for breakfast and dinner    INSULIN ASPART U-100 (NOVOLOG U-100 INSULIN ASPART) 100 UNIT/ML INJECTION    Inject 100 Units into the skin Every 3 (three) days. To use via insulin pump    INSULIN PUMP CART,AUTOMATED,BT (OMNIPOD 5 G6 PODS, GEN 5,) CRTG    Inject 1 each into the skin Every 3 (three) days.    INSULIN SYRINGE-NEEDLE U-100 0.3 ML 30 GAUGE X 5/16" SYRG    1 each by Misc.(Non-Drug; Combo Route) route Every 3 (three) days.    LAMOTRIGINE (LAMICTAL) 200 MG TABLET    Take 1 tablet (200 mg total) by mouth 2 (two) times daily.    LEVOCETIRIZINE (XYZAL) 5 MG TABLET    Take 1 tablet (5 mg total) by mouth every evening.    LIFITEGRAST (XIIDRA) 5 % DPET    Place 1 drop into both eyes 2 (two) times daily.    METOPROLOL SUCCINATE (TOPROL-XL) 25 MG 24 HR TABLET    Take 1 tablet (25 mg total) by mouth once daily.    METOPROLOL SUCCINATE (TOPROL-XL) 25 MG 24 HR TABLET    Take 1 tablet by mouth in the morning.    MIRTAZAPINE (REMERON) 30 MG TABLET    Take 1 tablet (30 mg total) by mouth every evening.    OMEPRAZOLE (PRILOSEC) 40 MG CAPSULE    Take 1 capsule " "(40 mg total) by mouth once daily.    PEN NEEDLE, DIABETIC (BD ULTRA-FINE MINI PEN NEEDLE) 31 GAUGE X 3/16" NDLE    Use one needle 5 times a day    PEN NEEDLE, DIABETIC (BD ULTRA-FINE MINI PEN NEEDLE) 31 GAUGE X 3/16" NDLE    Use 5 a day    POLYETHYLENE GLYCOL (GLYCOLAX) 17 GRAM/DOSE POWDER    Take 17 g by mouth once daily.    SECUKINUMAB (COSENTYX PEN, 2 PENS,) 150 MG/ML PNIJ    Inject 300 mg into the skin every 14 (fourteen) days.    SPIRONOLACTONE (ALDACTONE) 25 MG TABLET    Take 1 tablet (25 mg total) by mouth once daily.    SPIRONOLACTONE (ALDACTONE) 25 MG TABLET    Take 1 tablet by mouth in the morning.    SYMBICORT 160-4.5 MCG/ACTUATION HFAA    Inhale 2 puffs into the lungs in the morning and 2 puffs before bedtime. 2 puffs every 12 hours        Return to Clinic: 6 weeks    Time spent with pt including note preparation and paperwork: 28 minutes       Mariam Young, PhD, MP  Advanced Practice Medical Psychologist  [facility]  [address] [facility].  [place] [address]  [phone]   [phone] fax                      "

## 2024-05-20 ENCOUNTER — TELEPHONE (OUTPATIENT)
Dept: HEPATOLOGY | Facility: CLINIC | Age: 54
End: 2024-05-20
Payer: MEDICAID

## 2024-05-20 NOTE — TELEPHONE ENCOUNTER
----- Message from Zohra Starr sent at 5/20/2024  8:11 AM CDT -----  Contact: [phone]  Patient needs first available appointment due to rescheduling Fibro scan. Please call patient at  [phone]. Thanks KB

## 2024-05-23 ENCOUNTER — PROCEDURE VISIT (OUTPATIENT)
Dept: HEPATOLOGY | Facility: CLINIC | Age: 54
End: 2024-05-23
Payer: MEDICAID

## 2024-05-23 VITALS
HEIGHT: 56 IN | BODY MASS INDEX: 38.93 KG/M2 | WEIGHT: 173.06 LBS | DIASTOLIC BLOOD PRESSURE: 81 MMHG | HEART RATE: 80 BPM | SYSTOLIC BLOOD PRESSURE: 137 MMHG

## 2024-05-23 DIAGNOSIS — K76.0 FATTY LIVER: ICD-10-CM

## 2024-05-23 PROCEDURE — 91200 LIVER ELASTOGRAPHY: CPT | Mod: 26,S$PBB,, | Performed by: INTERNAL MEDICINE

## 2024-05-23 PROCEDURE — 91200 LIVER ELASTOGRAPHY: CPT | Mod: PBBFAC

## 2024-05-23 NOTE — PROCEDURES
FibroScan (Vibration Controlled Transient Elastography)    Date/Time: 5/23/2024 8:30 AM    Performed by: Valeri Graf RN  Authorized by: Tara Og MD    Diagnosis:  NAFLD    Probe:  M    Universal Protocol: Patient's identity, procedure and site were verified, confirmatory pause was performed.  Discussed procedure including risks and potential complications.  Questions answered.  Patient verbalizes understanding and wishes to proceed with VCTE.     Procedure: After providing explanations of the procedure, patient was placed in the supine position with right arm in maximum abduction to allow optimal exposure of right lateral abdomen.  Patient was briefly assessed, Testing was performed in the mid-axillary location, 50Hz Shear Wave pulses were applied and the resulting Shear Wave and Propagation Speed detected with a 3.5 MHz ultrasonic signal, using the FibroScan probe, Skin to liver capsule distance and liver parenchyma were accessed during the entire examination with the FibroScan probe, Patient was instructed to breathe normally and to abstain from sudden movements during the procedure, allowing for random measurements of liver stiffness. At least 10 Shear Waves were produced, Individual measurements of each Shear Wave were calculated.  Patient tolerated the procedure well with no complications.  Meets discharge criteria as was dismissed.  Rates pain 0 out of 10.  Patient will follow up with ordering provider to review results.    Findings  Median liver stiffness score:  4.9  CAP Reading: dB/m:  201    IQR/med %:  26  Interpretation  Fibrosis interpretation is based on medial liver stiffness - Kilopascal (kPa).    Fibrosis Stage:  F 0-1  Steatosis interpretation is based on controlled attenuation parameter - (dB/m).    Steatosis Grade:  <S1  Comments/Plan:  No significant steatosis or fibrosis

## 2024-05-24 ENCOUNTER — PATIENT MESSAGE (OUTPATIENT)
Dept: HEPATOLOGY | Facility: CLINIC | Age: 54
End: 2024-05-24
Payer: MEDICAID

## 2024-05-29 ENCOUNTER — TELEPHONE (OUTPATIENT)
Dept: PSYCHIATRY | Facility: CLINIC | Age: 54
End: 2024-05-29
Payer: MEDICAID

## 2024-05-29 NOTE — TELEPHONE ENCOUNTER
Nurse returned call to patient. She stated that she has been sleeping too much, fatigue and depressed since starting Lumateperone 2 mg. She also stated that she can't concentrate and that she has bad ADHD/ADD     ----- Message from Winifred Balderas MA sent at 5/29/2024  8:38 AM CDT -----  Type:  Needs Medical Advice/Symptom-based Call    Who Called:  Pt   Symptoms (please be specific):  Fatigue/oversleeping and depression   How long has patient had these symptoms:    Since starting lumateperone 42 mg Cap  Pharmacy:  [id number] - [place] - [address] AT [facility]  [phone]    Would the patient rather a call back or a response via [facility]?    Best Call Back Number:  [phone]  Additional Information:

## 2024-05-30 ENCOUNTER — TELEPHONE (OUTPATIENT)
Dept: PSYCHIATRY | Facility: CLINIC | Age: 54
End: 2024-05-30
Payer: MEDICAID

## 2024-05-30 NOTE — TELEPHONE ENCOUNTER
Nurse called the patient. Nurse asked her what medications is she needing refills on and she stated that she did not need any refills. She stated she was calling because she never got a call back for concerns on yesterday. Nurse informed her that the provider sent a message on yesterday (that she read) and that the provider was waiting her to respond back. She stated  that she does not like to send messages due the things she is going through

## 2024-05-30 NOTE — TELEPHONE ENCOUNTER
----- Message from Skye Arriola sent at 5/30/2024  2:28 PM CDT -----  Regarding: repeat caller  Name of Who is Calling:  Pt called         What is the request in detail:    The pt is calling back for the 4th time and wants to speak with the dr. Please advise       Can the clinic reply by [facility]:  Call she doesn't read portal messages         What Number to Call Back if not in [facility]: Telephone Information:  Mobile          [phone]

## 2024-06-03 ENCOUNTER — OFFICE VISIT (OUTPATIENT)
Dept: PSYCHIATRY | Facility: CLINIC | Age: 54
End: 2024-06-03
Payer: MEDICAID

## 2024-06-03 DIAGNOSIS — F41.1 GENERALIZED ANXIETY DISORDER: Chronic | ICD-10-CM

## 2024-06-03 DIAGNOSIS — F25.0 SCHIZOAFFECTIVE DISORDER, BIPOLAR TYPE: Primary | Chronic | ICD-10-CM

## 2024-06-03 PROCEDURE — 1159F MED LIST DOCD IN RCRD: CPT | Mod: CPTII,95,, | Performed by: PSYCHOLOGIST

## 2024-06-03 PROCEDURE — 4010F ACE/ARB THERAPY RXD/TAKEN: CPT | Mod: CPTII,95,, | Performed by: PSYCHOLOGIST

## 2024-06-03 PROCEDURE — 99214 OFFICE O/P EST MOD 30 MIN: CPT | Mod: HP,HB,95, | Performed by: PSYCHOLOGIST

## 2024-06-03 RX ORDER — LUMATEPERONE 10.5 MG/1
10.5 CAPSULE ORAL DAILY
Qty: 30 CAPSULE | Refills: 0 | Status: SHIPPED | OUTPATIENT
Start: 2024-06-03 | End: 2024-07-03

## 2024-06-03 NOTE — PATIENT INSTRUCTIONS

## 2024-06-03 NOTE — PROGRESS NOTES
"Outpatient Psychiatry Follow-Up Visit    6/3/2024      Virtual Visit    The patient location is: Patient's home/ Patient reported that his/her location at the time of this visit was in the [facility]     Visit type: Virtual visit with synchronous audio and video     Each patient to whom he or she provides medical services by telehealth is: (1) informed of the relationship between the medical psychologist and patient and the respective role of any other health care provider with respect to management of the patient; and (2) notified that he or she may decline to receive medical services by telehealth and may withdraw from such care at any time.    I also informed patient of the following:   Mariam Young, PhD, MPAP:  [place] medical license number: [id number]    My contact info:  [facility] / 2nd Floor  [address] [place] [address]   Ph: [phone]    If technology issues, call office phone: Ph: [phone] or [phone]  If crisis: Dial 911 or go to nearest Emergency Room (ER)  If questions related to privacy practices: contact [facility] Information Department: [phone]    Chief Complaint:  Yolanda Betts is a 54 y.o. female who presents today for follow-up of mood and anxiety.       LAST VISIT: Yolanda attended her virtual visit. She reported that she thinks that she is either manic, depressed, or a combination of both. She reported that she has been shopping and spending money even though she does not have it. She said that she has been doing that for about the past 3 weeks--clearance items so can't return any of it. She has not been in an IOP--"it's been awhile." She reported that she had been referred to [facility] but has not gone because she does not want to leave her house. She reported having higher anxiety, panic attacks, "I just want to be at home." When asked about sleep, she said "a little bit. I wake up generally around 4 or 5 and " "can't go back." She is going to bed around 10 or 11--getting about 6 hours of sleep. She reported that she still has hallucinations "always" even when not depressed. She does still see her therapist virtually on a weekly basis.  We discussed changing from Vraylar to Caplyta and her attending an IOP.  She agreed to call today to [facility], which is where she had previously been referred.  She also agreed that she would start the program next week.  See plan below.    Plan--continue Lamictal 200 mg bid; Xanax 1 mg bid; mirtazepine 30 mg hs; decrease Vraylar 1.5 mg for one week, while starting Caplyta 42 mg, then stop Vraylar and continue on Caplyta 42 mg; takes Cymbalta 60 mg bid from another provider; exercise/movement daily; IOP next week at latest (call today)    CURRENT PRESENTATION: Yolanda attended her virtual visit. She had been feeling tired and depressed and having trouble concentrating since taking "Caplyta 2 mg". When we made the change, she had been feeling hypomanic with Vraylar. She is "exhausted" and feels "fatigued, anxious", sometimes nauseated; more depressed; having some panic attacks. She denied having any palpitations. She has been taking Xanax regularly--sleeping a lot. She had difficulty holding her eyes open for this visit. We will try the lower dose of Caplyta. See plan below.    Plan--continue Lamictal 200 mg bid; Xanax 1 mg bid; mirtazepine 30 mg hs; decrease Caplyta 10.5 mg; takes Cymbalta 60 mg bid from another provider; exercise/movement daily; IOP next week at latest (call today)     since May 2024.        ----------------------------------------------------------------------------------------------------------  Therapist: Feroz Stanley LCSW with [facility]   Ph: [phone]  Fax: [phone]    Prior medicines: Prozac, Wellbutrin, Paxil, Lamictal, Lexapro, Celexa, Cymbalta, Remeron, Abilify, Seroquel (raised blood sugars), Risperdal, Latuda, Vraylar, Restoril, " "Ambien (groggy), Ambien CR, trazodone, Xanax, clonazepam        6/3/2024     8:12 AM 5/8/2024     1:50 PM 3/22/2024     9:44 AM   GAD7   1. Feeling nervous, anxious, or on edge? 3 3 3   2. Not being able to stop or control worrying? 3 3 3   3. Worrying too much about different things? 3 3 3   4. Trouble relaxing? 3 3 3   5. Being so restless that it is hard to sit still? 3 3 3   6. Becoming easily annoyed or irritable? 3 3 3   7. Feeling afraid as if something awful might happen? 3 3 3   JOANN-7 Score 21 21 21      0-4 = Minimal anxiety  5-9 = Mild anxiety  10-14 = Moderate anxiety  15-21 = Severe anxiety       Review of Systems   PSYCHIATRIC: Pertinant items are noted in the narrative.    Past Medical, Family and Social History: The patient's past medical, family and social history have been reviewed and updated as appropriate within the electronic medical record - see encounter notes.      Current Outpatient Medications:     albuterol (PROVENTIL) 2.5 mg /3 mL (0.083 %) nebulizer solution, Take 2.5 mg by nebulization every 4 (four) hours as needed. Times a day, Disp: 75 mL, Rfl: 1    albuterol (PROVENTIL/VENTOLIN HFA) 90 mcg/actuation inhaler, Inhale 2 puffs into the lungs every 6 (six) hours as needed for Wheezing., Disp: 8.5 g, Rfl: 1    ALPRAZolam (XANAX) 1 MG tablet, Take 1 tablet (1 mg total) by mouth 2 (two) times daily as needed for Anxiety., Disp: 60 tablet, Rfl: 2    amlodipine-valsartan (EXFORGE)  mg per tablet, Take 1 tablet by mouth once daily., Disp: 90 tablet, Rfl: 3    amlodipine-valsartan (EXFORGE)  mg per tablet, Take 1 tablet by mouth in the morning., Disp: 90 tablet, Rfl: 1    atorvastatin (LIPITOR) 20 MG tablet, Take 1 tablet (20 mg total) by mouth every evening, Disp: 90 tablet, Rfl: 3    BD INSULIN SYRINGE ULTRA-FINE 1/2 mL 30 gauge x 1/2" Syrg, , Disp: , Rfl: 0    blood-glucose sensor (DEXCOM G6 SENSOR) Mariela, 1 each by Misc.(Non-Drug; Combo Route) route every 10 days., Disp: 3 each, " Rfl: 11    blood-glucose sensor (DEXCOM G7 SENSOR) Mariela, Use to check bg. Change every 10 days, Disp: 3 each, Rfl: 11    blood-glucose transmitter (DEXCOM G6 TRANSMITTER) Mariela, 1 each by Misc.(Non-Drug; Combo Route) route every 3 (three) months., Disp: 1 each, Rfl: 3    celecoxib (CELEBREX) 100 MG capsule, Take 1 capsule (100 mg total) by mouth 2 (two) times daily., Disp: 60 capsule, Rfl: 2    cyclobenzaprine (FLEXERIL) 10 MG tablet, Take 1 tablet (10 mg total) by mouth 3 (three) times daily as needed (Pain)., Disp: 90 tablet, Rfl: 11    DULoxetine (CYMBALTA) 60 MG capsule, Take 1 capsule (60 mg total) by mouth 2 (two) times daily., Disp: 180 capsule, Rfl: 3    ergocalciferol (VITAMIN D2) 50,000 unit Cap, Take 1 capsule twice weekly for 3 weeks then weekly, Disp: 12 capsule, Rfl: 3    estradioL (ESTRACE) 0.01 % (0.1 mg/gram) vaginal cream, Place 1 g vaginally twice a week., Disp: 42.5 g, Rfl: 3    fenofibrate (TRICOR) 145 MG tablet, Take 1 tablet (145 mg total) by mouth once daily., Disp: 90 tablet, Rfl: 3    fluticasone propionate (FLONASE) 50 mcg/actuation nasal spray, 2 sprays (100 mcg total) by Each Nare route once daily., Disp: 16 g, Rfl: 11    frovatriptan (FROVA) 2.5 MG tablet, Take 1 tablet at onset of acute migraine. May take 1 more tablet 2 hours later if needed. (MAX 2 TABLET PER DAY. MAX 4 TABLETS PER WEEK.), Disp: 9 tablet, Rfl: 1    frovatriptan (FROVA) 2.5 MG tablet, Take 1 tablet by mouth as needed for Migraine for up to 180 days. If recurs, may repeat after 2 hours. Max of 3 tabs in 24 hours., Disp: 10 tablet, Rfl: 2    furosemide (LASIX) 20 MG tablet, Take 1 tablet (20 mg total) by mouth once daily., Disp: 90 tablet, Rfl: 3    glucagon (BAQSIMI) 3 mg/actuation Spry, 1 spray by Nasal route as needed (hypoglycemia). For emergency use. May repeat 1 time after 15 minutes, Disp: 2 each, Rfl: 1    insulin aspart U-100 (NOVOLOG U-100 INSULIN ASPART) 100 unit/mL injection, Inject 100 Units into the skin  "Every 3 (three) days. To use via insulin pump, Disp: 10 mL, Rfl: 5    insulin pump cart,automated,BT (OMNIPOD 5 G6 PODS, GEN 5,) Crtg, Inject 1 each into the skin Every 3 (three) days., Disp: 30 each, Rfl: 3    insulin syringe-needle U-100 0.3 mL 30 gauge x 5/16" Syrg, 1 each by Misc.(Non-Drug; Combo Route) route Every 3 (three) days., Disp: 100 each, Rfl: 2    lamoTRIgine (LAMICTAL) 200 MG tablet, Take 1 tablet (200 mg total) by mouth 2 (two) times daily., Disp: 60 tablet, Rfl: 2    levocetirizine (XYZAL) 5 MG tablet, Take 1 tablet (5 mg total) by mouth every evening., Disp: 30 tablet, Rfl: 11    lifitegrast (XIIDRA) 5 % Dpet, Place 1 drop into both eyes 2 (two) times daily., Disp: 60 each, Rfl: 12    lumateperone 42 mg Cap, Take 1 capsule (42 mg total) by mouth once daily., Disp: 30 capsule, Rfl: 2    metoprolol succinate (TOPROL-XL) 25 MG 24 hr tablet, Take 1 tablet (25 mg total) by mouth once daily., Disp: 90 tablet, Rfl: 3    metoprolol succinate (TOPROL-XL) 25 MG 24 hr tablet, Take 1 tablet by mouth in the morning., Disp: 90 tablet, Rfl: 1    mirtazapine (REMERON) 30 MG tablet, Take 1 tablet (30 mg total) by mouth every evening., Disp: 30 tablet, Rfl: 1    omeprazole (PRILOSEC) 40 MG capsule, Take 1 capsule (40 mg total) by mouth once daily., Disp: 90 capsule, Rfl: 3    pen needle, diabetic (BD ULTRA-FINE MINI PEN NEEDLE) 31 gauge x 3/16" Ndle, Use one needle 5 times a day, Disp: 200 each, Rfl: 11    pen needle, diabetic (BD ULTRA-FINE MINI PEN NEEDLE) 31 gauge x 3/16" Ndle, Use 5 a day, Disp: 200 each, Rfl: 11    polyethylene glycol (GLYCOLAX) 17 gram/dose powder, Take 17 g by mouth once daily., Disp: 1530 g, Rfl: 3    secukinumab (COSENTYX PEN, 2 PENS,) 150 mg/mL PnIj, Inject 300 mg into the skin every 14 (fourteen) days., Disp: 4 mL, Rfl: 3    spironolactone (ALDACTONE) 25 MG tablet, Take 1 tablet (25 mg total) by mouth once daily., Disp: 90 tablet, Rfl: 3    spironolactone (ALDACTONE) 25 MG tablet, Take 1 " "tablet by mouth in the morning., Disp: 90 tablet, Rfl: 1    SYMBICORT 160-4.5 mcg/actuation HFAA, Inhale 2 puffs into the lungs in the morning and 2 puffs before bedtime. 2 puffs every 12 hours, Disp: 10.2 g, Rfl: 2  No current facility-administered medications for this visit.    Facility-Administered Medications Ordered in Other Visits:     ondansetron injection 4 mg, 4 mg, Intravenous, Once PRN, Otto Phillips MD    Compliance: yes    Side effects: see above    Risk Parameters:  Patient reports no suicidal ideation  Patient reports no homicidal ideation  Patient reports no self-injurious behavior  Patient reports no violent behavior    Exam (detailed: at least 9 elements; comprehensive: all 15 elements)   Constitutional  Vitals:  Most recent vital signs were reviewed.   Last 3 sets of Vitals        4/4/2024     8:17 AM 4/5/2024     1:30 PM 5/23/2024     8:45 AM   Vitals - 1 value per visit   SYSTOLIC 141  137   DIASTOLIC 81  81   Pulse 78  80   Temp 97.3 °F (36.3 °C)     Resp 14     SPO2 99 %     Weight (lb)   173.06   Weight (kg)   78.5   Height 4' 8" (1.422 m)  4' 8" (1.422 m)   BMI (Calculated)   38.8   Pain Score  Zero Zero          General:  age appropriate, casually dressed, neatly groomed, scarf over hair     Musculoskeletal  Muscle Strength/Tone:  no tremor, no tic   Gait & Station:  video visit     Psychiatric  Speech:  no latency; no press, soft   Behavior: Very sleepy--could hardly keep her eyes open   Mood & Affect:  anxious, depressed, sleepy/tired  blunted   Thought Process:  normal and logical   Associations:  intact   Thought Content:  hallucinations; denied suicidal ideation   Insight:  has awareness of illness   Judgement: behavior is adequate to circumstances   Orientation:  grossly intact   Memory: intact for content of interview   Language: grossly intact   Attention Span & Concentration:  Grossly intact   Fund of Knowledge:  intact and appropriate to age and level of education " "    Assessment and Diagnosis   Status/Progress: Based on the examination today, the patient's problem(s) is/are treatment resistant and failing to respond as expected to treatment.  New problems have not been presented today.   Co-morbidities and psychosocial stressors  are complicating management of the primary condition.  The working differential for this patient includes schizoaffective vs bipolar vs schizophrenia.     General Impression:     Encounter Diagnoses   Name Primary?    Schizoaffective disorder, bipolar type Yes    Generalized anxiety disorder        Intervention/Counseling/Treatment Plan   Medication Management: Discussed risks, benefits, and alternatives to treatment plan documented above with patient. I answered all patient questions related to this plan, and patient expressed understanding and agreement.   continue Lamictal 200 mg bid; Xanax 1 mg bid; mirtazepine 30 mg hs; decrease Caplyta 10.5 mg; takes Cymbalta 60 mg bid from another provider  Continue with therapy    IOP       Medication List with Changes/Refills   Current Medications    ALBUTEROL (PROVENTIL) 2.5 MG /3 ML (0.083 %) NEBULIZER SOLUTION    Take 2.5 mg by nebulization every 4 (four) hours as needed. Times a day    ALBUTEROL (PROVENTIL/VENTOLIN HFA) 90 MCG/ACTUATION INHALER    Inhale 2 puffs into the lungs every 6 (six) hours as needed for Wheezing.    ALPRAZOLAM (XANAX) 1 MG TABLET    Take 1 tablet (1 mg total) by mouth 2 (two) times daily as needed for Anxiety.    AMLODIPINE-VALSARTAN (EXFORGE)  MG PER TABLET    Take 1 tablet by mouth once daily.    AMLODIPINE-VALSARTAN (EXFORGE)  MG PER TABLET    Take 1 tablet by mouth in the morning.    ATORVASTATIN (LIPITOR) 20 MG TABLET    Take 1 tablet (20 mg total) by mouth every evening    BD INSULIN SYRINGE ULTRA-FINE 1/2 ML 30 GAUGE X 1/2" SYRG        BLOOD-GLUCOSE SENSOR (DEXCOM G6 SENSOR) NHI    1 each by Misc.(Non-Drug; Combo Route) route every 10 days.    BLOOD-GLUCOSE " "SENSOR (DEXCOM G7 SENSOR) NHI    Use to check bg. Change every 10 days    BLOOD-GLUCOSE TRANSMITTER (DEXCOM G6 TRANSMITTER) NHI    1 each by Misc.(Non-Drug; Combo Route) route every 3 (three) months.    CELECOXIB (CELEBREX) 100 MG CAPSULE    Take 1 capsule (100 mg total) by mouth 2 (two) times daily.    CYCLOBENZAPRINE (FLEXERIL) 10 MG TABLET    Take 1 tablet (10 mg total) by mouth 3 (three) times daily as needed (Pain).    DULOXETINE (CYMBALTA) 60 MG CAPSULE    Take 1 capsule (60 mg total) by mouth 2 (two) times daily.    ERGOCALCIFEROL (VITAMIN D2) 50,000 UNIT CAP    Take 1 capsule twice weekly for 3 weeks then weekly    ESTRADIOL (ESTRACE) 0.01 % (0.1 MG/GRAM) VAGINAL CREAM    Place 1 g vaginally twice a week.    FENOFIBRATE (TRICOR) 145 MG TABLET    Take 1 tablet (145 mg total) by mouth once daily.    FLUTICASONE PROPIONATE (FLONASE) 50 MCG/ACTUATION NASAL SPRAY    2 sprays (100 mcg total) by Each Nare route once daily.    FROVATRIPTAN (FROVA) 2.5 MG TABLET    Take 1 tablet at onset of acute migraine. May take 1 more tablet 2 hours later if needed. (MAX 2 TABLET PER DAY. MAX 4 TABLETS PER WEEK.)    FROVATRIPTAN (FROVA) 2.5 MG TABLET    Take 1 tablet by mouth as needed for Migraine for up to 180 days. If recurs, may repeat after 2 hours. Max of 3 tabs in 24 hours.    FUROSEMIDE (LASIX) 20 MG TABLET    Take 1 tablet (20 mg total) by mouth once daily.    GLUCAGON (BAQSIMI) 3 MG/ACTUATION SPRY    1 spray by Nasal route as needed (hypoglycemia). For emergency use. May repeat 1 time after 15 minutes    INSULIN ASPART U-100 (NOVOLOG U-100 INSULIN ASPART) 100 UNIT/ML INJECTION    Inject 100 Units into the skin Every 3 (three) days. To use via insulin pump    INSULIN PUMP CART,AUTOMATED,BT (OMNIPOD 5 G6 PODS, GEN 5,) CRTG    Inject 1 each into the skin Every 3 (three) days.    INSULIN SYRINGE-NEEDLE U-100 0.3 ML 30 GAUGE X 5/16" SYRG    1 each by Misc.(Non-Drug; Combo Route) route Every 3 (three) days.    LAMOTRIGINE " "(LAMICTAL) 200 MG TABLET    Take 1 tablet (200 mg total) by mouth 2 (two) times daily.    LEVOCETIRIZINE (XYZAL) 5 MG TABLET    Take 1 tablet (5 mg total) by mouth every evening.    LIFITEGRAST (XIIDRA) 5 % DPET    Place 1 drop into both eyes 2 (two) times daily.    LUMATEPERONE 42 MG CAP    Take 1 capsule (42 mg total) by mouth once daily.    METOPROLOL SUCCINATE (TOPROL-XL) 25 MG 24 HR TABLET    Take 1 tablet (25 mg total) by mouth once daily.    METOPROLOL SUCCINATE (TOPROL-XL) 25 MG 24 HR TABLET    Take 1 tablet by mouth in the morning.    MIRTAZAPINE (REMERON) 30 MG TABLET    Take 1 tablet (30 mg total) by mouth every evening.    OMEPRAZOLE (PRILOSEC) 40 MG CAPSULE    Take 1 capsule (40 mg total) by mouth once daily.    PEN NEEDLE, DIABETIC (BD ULTRA-FINE MINI PEN NEEDLE) 31 GAUGE X 3/16" NDLE    Use one needle 5 times a day    PEN NEEDLE, DIABETIC (BD ULTRA-FINE MINI PEN NEEDLE) 31 GAUGE X 3/16" NDLE    Use 5 a day    POLYETHYLENE GLYCOL (GLYCOLAX) 17 GRAM/DOSE POWDER    Take 17 g by mouth once daily.    SECUKINUMAB (COSENTYX PEN, 2 PENS,) 150 MG/ML PNIJ    Inject 300 mg into the skin every 14 (fourteen) days.    SPIRONOLACTONE (ALDACTONE) 25 MG TABLET    Take 1 tablet (25 mg total) by mouth once daily.    SPIRONOLACTONE (ALDACTONE) 25 MG TABLET    Take 1 tablet by mouth in the morning.    SYMBICORT 160-4.5 MCG/ACTUATION HFAA    Inhale 2 puffs into the lungs in the morning and 2 puffs before bedtime. 2 puffs every 12 hours        Return to Clinic: as scheduled    Time spent with pt including note preparation: 20 minutes       Mariam Young, PhD, MP  Advanced Practice Medical Psychologist  [facility]--[address]  [place] [address]  [phone]   [phone] fax                        "

## 2024-06-10 ENCOUNTER — TELEPHONE (OUTPATIENT)
Dept: PSYCHIATRY | Facility: CLINIC | Age: 54
End: 2024-06-10
Payer: MEDICAID

## 2024-06-11 ENCOUNTER — TELEPHONE (OUTPATIENT)
Dept: DIABETES | Facility: CLINIC | Age: 54
End: 2024-06-11
Payer: MEDICAID

## 2024-06-11 NOTE — TELEPHONE ENCOUNTER
Spoke with pt. She reports higher BG levels recently. She doesn't think it is a kinked Omni5 Pod canula and has been increasing water intake.     Reviewed [place] reports w/ pt & saved media. Noted several missing meal bolus does and late bolus doses.     Pt with difficulty recalling food intake but admits to inconsistency of bolus dosing due to recent social stressors. She reports having therapy visit tomorrow and family/friend support.     Offered clinic follow-up visit to review carb gram counting; however, pt declined and feels like she has tools to improve bolus entry. Suggested keeping food record to assist her with process. Also, discussed when and how to enter bolus correction dose. Pt verbalized understanding and agrees to maintain clinic contact.     Provider notified. Successful outreach.

## 2024-06-11 NOTE — TELEPHONE ENCOUNTER
----- Message from Tari Stoner sent at 6/11/2024  9:58 AM CDT -----  Regarding: Concerns  Name of Who is Calling: Yolanda           What is the request in detail:Pt is requesting a call back in concern to high glucose levels.             Can the clinic reply by [facility]:No            What Number to Call Back if not in [facility]:[phone]

## 2024-06-12 ENCOUNTER — OFFICE VISIT (OUTPATIENT)
Dept: PSYCHIATRY | Facility: CLINIC | Age: 54
End: 2024-06-12
Payer: MEDICAID

## 2024-06-12 DIAGNOSIS — F41.1 GENERALIZED ANXIETY DISORDER: Chronic | ICD-10-CM

## 2024-06-12 DIAGNOSIS — F25.0 SCHIZOAFFECTIVE DISORDER, BIPOLAR TYPE: Primary | Chronic | ICD-10-CM

## 2024-06-12 PROCEDURE — 99214 OFFICE O/P EST MOD 30 MIN: CPT | Mod: HP,HB,95, | Performed by: PSYCHOLOGIST

## 2024-06-12 PROCEDURE — 4010F ACE/ARB THERAPY RXD/TAKEN: CPT | Mod: CPTII,95,, | Performed by: PSYCHOLOGIST

## 2024-06-12 PROCEDURE — 1159F MED LIST DOCD IN RCRD: CPT | Mod: CPTII,95,, | Performed by: PSYCHOLOGIST

## 2024-06-12 NOTE — PATIENT INSTRUCTIONS

## 2024-06-12 NOTE — PROGRESS NOTES
"Outpatient Psychiatry Follow-Up Visit    6/12/2024      Virtual Visit    The patient location is: Patient's home/ Patient reported that his/her location at the time of this visit was in the [facility]     Visit type: Virtual visit with synchronous audio and video     Each patient to whom he or she provides medical services by telehealth is: (1) informed of the relationship between the medical psychologist and patient and the respective role of any other health care provider with respect to management of the patient; and (2) notified that he or she may decline to receive medical services by telehealth and may withdraw from such care at any time.    I also informed patient of the following:   Mariam Young, PhD, MPAP:  [place] medical license number: [id number]    My contact info:  [facility] / 2nd Floor  [address] [address] [place] [address]   Ph: [phone]    If technology issues, call office phone: Ph: [phone] or [phone]  If crisis: Dial 911 or go to nearest Emergency Room (ER)  If questions related to privacy practices: contact [facility] Information Department: [phone]    Chief Complaint:  Yolanda Betts is a 54 y.o. female who presents today for follow-up of mood and anxiety.       LAST VISIT: Yolanda attended her virtual visit. She had been feeling tired and depressed and having trouble concentrating since taking "Caplyta 2 mg". When we made the change, she had been feeling hypomanic with Vraylar. She is "exhausted" and feels "fatigued, anxious", sometimes nauseated; more depressed; having some panic attacks. She denied having any palpitations. She has been taking Xanax regularly--sleeping a lot. She had difficulty holding her eyes open for this visit. We will try the lower dose of Caplyta. See plan below.    Plan--continue Lamictal 200 mg bid; Xanax 1 mg bid; mirtazepine 30 mg hs; decrease Caplyta 10.5 mg; takes Cymbalta 60 mg bid from " "another provider; exercise/movement daily; IOP next week at latest (call today)    CURRENT PRESENTATION: Yolanda attended her virtual visit. She has been in a lot of pain--hurting with her back pain, "and arms and legs feel weak." She has an upcoming appt.--sees Shaq Anderson MD in rheumatology. She denied having any adverse effects from the change to Caplyta since we decreased her dose--sedation is better. She reported "brain fog. It's hard to remember stuff." She can't tell if her mood is better, but this is complicated with her pain.    She has not attended IOP because of her pain.  She is still in therapy. Her therapist is "on a 2-week vacation." She last saw him last week.  We agreed to leave her medicines as currently prescribed and plan an in office visit in a month.  The goal is still to get her into IOP as she is physically capable.    Plan--continue Lamictal 200 mg bid; Xanax 1 mg bid; mirtazepine 30 mg hs; Caplyta 10.5 mg; takes Cymbalta 60 mg bid from another provider; IOP     since May 2024.          Therapist: Feroz Stanley LCSW with [facility]   Ph: [phone]  Fax: [phone]    Prior medicines: Prozac, Wellbutrin, Paxil, Lamictal, Lexapro, Celexa, Cymbalta, Remeron, Abilify, Seroquel (raised blood sugars), Risperdal, Latuda, Vraylar, Restoril, Ambien ([facility]), Ambien CR, trazodone, Xanax, clonazepam  ----------------------------------------------------------------------------------------------------------        6/12/2024     9:45 AM 6/3/2024     8:12 AM 5/8/2024     1:50 PM   GAD7   1. Feeling nervous, anxious, or on edge? 3 3 3   2. Not being able to stop or control worrying? 3 3 3   3. Worrying too much about different things? 3 3 3   4. Trouble relaxing? 3 3 3   5. Being so restless that it is hard to sit still? 3 3 3   6. Becoming easily annoyed or irritable? 3 3 3   7. Feeling afraid as if something awful might happen? 3 3 3   JOANN-7 Score 21 21 21      0-4 = Minimal " "anxiety  5-9 = Mild anxiety  10-14 = Moderate anxiety  15-21 = Severe anxiety       Review of Systems   PSYCHIATRIC: Pertinant items are noted in the narrative.    Past Medical, Family and Social History: The patient's past medical, family and social history have been reviewed and updated as appropriate within the electronic medical record - see encounter notes.      Current Outpatient Medications:     albuterol (PROVENTIL) 2.5 mg /3 mL (0.083 %) nebulizer solution, Take 2.5 mg by nebulization every 4 (four) hours as needed. Times a day, Disp: 75 mL, Rfl: 1    albuterol (PROVENTIL/VENTOLIN HFA) 90 mcg/actuation inhaler, Inhale 2 puffs into the lungs every 6 (six) hours as needed for Wheezing., Disp: 8.5 g, Rfl: 1    ALPRAZolam (XANAX) 1 MG tablet, Take 1 tablet (1 mg total) by mouth 2 (two) times daily as needed for Anxiety., Disp: 60 tablet, Rfl: 2    amlodipine-valsartan (EXFORGE)  mg per tablet, Take 1 tablet by mouth once daily., Disp: 90 tablet, Rfl: 3    amlodipine-valsartan (EXFORGE)  mg per tablet, Take 1 tablet by mouth in the morning., Disp: 90 tablet, Rfl: 1    atorvastatin (LIPITOR) 20 MG tablet, Take 1 tablet (20 mg total) by mouth every evening, Disp: 90 tablet, Rfl: 3    BD INSULIN SYRINGE ULTRA-FINE 1/2 mL 30 gauge x 1/2" Syrg, , Disp: , Rfl: 0    blood-glucose sensor (DEXCOM G6 SENSOR) Mariela, 1 each by Misc.(Non-Drug; Combo Route) route every 10 days., Disp: 3 each, Rfl: 11    blood-glucose sensor (DEXCOM G7 SENSOR) Mariela, Use to check bg. Change every 10 days, Disp: 3 each, Rfl: 11    blood-glucose transmitter (DEXCOM G6 TRANSMITTER) Mariela, 1 each by Misc.(Non-Drug; Combo Route) route every 3 (three) months., Disp: 1 each, Rfl: 3    celecoxib (CELEBREX) 100 MG capsule, Take 1 capsule (100 mg total) by mouth 2 (two) times daily., Disp: 60 capsule, Rfl: 2    cyclobenzaprine (FLEXERIL) 10 MG tablet, Take 1 tablet (10 mg total) by mouth 3 (three) times daily as needed (Pain)., Disp: 90 tablet, " "Rfl: 11    DULoxetine (CYMBALTA) 60 MG capsule, Take 1 capsule (60 mg total) by mouth 2 (two) times daily., Disp: 180 capsule, Rfl: 3    ergocalciferol (VITAMIN D2) 50,000 unit Cap, Take 1 capsule twice weekly for 3 weeks then weekly, Disp: 12 capsule, Rfl: 3    estradioL (ESTRACE) 0.01 % (0.1 mg/gram) vaginal cream, Place 1 g vaginally twice a week., Disp: 42.5 g, Rfl: 3    fenofibrate (TRICOR) 145 MG tablet, Take 1 tablet (145 mg total) by mouth once daily., Disp: 90 tablet, Rfl: 3    fluticasone propionate (FLONASE) 50 mcg/actuation nasal spray, 2 sprays (100 mcg total) by Each Nare route once daily., Disp: 16 g, Rfl: 11    frovatriptan (FROVA) 2.5 MG tablet, Take 1 tablet at onset of acute migraine. May take 1 more tablet 2 hours later if needed. (MAX 2 TABLET PER DAY. MAX 4 TABLETS PER WEEK.), Disp: 9 tablet, Rfl: 1    frovatriptan (FROVA) 2.5 MG tablet, Take 1 tablet by mouth as needed for Migraine for up to 180 days. If recurs, may repeat after 2 hours. Max of 3 tabs in 24 hours., Disp: 10 tablet, Rfl: 2    furosemide (LASIX) 20 MG tablet, Take 1 tablet (20 mg total) by mouth once daily., Disp: 90 tablet, Rfl: 3    glucagon (BAQSIMI) 3 mg/actuation Spry, 1 spray by Nasal route as needed (hypoglycemia). For emergency use. May repeat 1 time after 15 minutes, Disp: 2 each, Rfl: 1    insulin aspart U-100 (NOVOLOG U-100 INSULIN ASPART) 100 unit/mL injection, Inject 100 Units into the skin Every 3 (three) days. To use via insulin pump, Disp: 10 mL, Rfl: 5    insulin pump cart,automated,BT (OMNIPOD 5 G6 PODS, GEN 5,) Crtg, Inject 1 each into the skin Every 3 (three) days., Disp: 30 each, Rfl: 3    insulin syringe-needle U-100 0.3 mL 30 gauge x 5/16" Syrg, 1 each by Misc.(Non-Drug; Combo Route) route Every 3 (three) days., Disp: 100 each, Rfl: 2    lamoTRIgine (LAMICTAL) 200 MG tablet, Take 1 tablet (200 mg total) by mouth 2 (two) times daily., Disp: 60 tablet, Rfl: 2    levocetirizine (XYZAL) 5 MG tablet, Take 1 " "tablet (5 mg total) by mouth every evening., Disp: 30 tablet, Rfl: 11    lifitegrast (XIIDRA) 5 % Dpet, Place 1 drop into both eyes 2 (two) times daily., Disp: 60 each, Rfl: 12    lumateperone (CAPLYTA) 10.5 mg Cap, Take 1 capsule (10.5 mg total) by mouth once daily., Disp: 30 capsule, Rfl: 0    metoprolol succinate (TOPROL-XL) 25 MG 24 hr tablet, Take 1 tablet (25 mg total) by mouth once daily., Disp: 90 tablet, Rfl: 3    metoprolol succinate (TOPROL-XL) 25 MG 24 hr tablet, Take 1 tablet by mouth in the morning., Disp: 90 tablet, Rfl: 1    mirtazapine (REMERON) 30 MG tablet, Take 1 tablet (30 mg total) by mouth every evening., Disp: 30 tablet, Rfl: 1    omeprazole (PRILOSEC) 40 MG capsule, Take 1 capsule (40 mg total) by mouth once daily., Disp: 90 capsule, Rfl: 3    pen needle, diabetic (BD ULTRA-FINE MINI PEN NEEDLE) 31 gauge x 3/16" Ndle, Use one needle 5 times a day, Disp: 200 each, Rfl: 11    pen needle, diabetic (BD ULTRA-FINE MINI PEN NEEDLE) 31 gauge x 3/16" Ndle, Use 5 a day, Disp: 200 each, Rfl: 11    polyethylene glycol (GLYCOLAX) 17 gram/dose powder, Take 17 g by mouth once daily., Disp: 1530 g, Rfl: 3    secukinumab (COSENTYX PEN, 2 PENS,) 150 mg/mL PnIj, Inject 300 mg into the skin every 14 (fourteen) days., Disp: 4 mL, Rfl: 3    spironolactone (ALDACTONE) 25 MG tablet, Take 1 tablet (25 mg total) by mouth once daily., Disp: 90 tablet, Rfl: 3    spironolactone (ALDACTONE) 25 MG tablet, Take 1 tablet by mouth in the morning., Disp: 90 tablet, Rfl: 1    SYMBICORT 160-4.5 mcg/actuation HFAA, Inhale 2 puffs into the lungs in the morning and 2 puffs before bedtime. 2 puffs every 12 hours, Disp: 10.2 g, Rfl: 2  No current facility-administered medications for this visit.    Facility-Administered Medications Ordered in Other Visits:     ondansetron injection 4 mg, 4 mg, Intravenous, Once PRN, Otto Phillips MD    Compliance: yes    Side effects: see above    Risk Parameters:  Patient reports no suicidal " "ideation  Patient reports no homicidal ideation  Patient reports no self-injurious behavior  Patient reports no violent behavior    Exam (detailed: at least 9 elements; comprehensive: all 15 elements)   Constitutional  Vitals:  Most recent vital signs were reviewed.   Last 3 sets of Vitals        4/4/2024     8:17 AM 4/5/2024     1:30 PM 5/23/2024     8:45 AM   Vitals - 1 value per visit   SYSTOLIC 141  137   DIASTOLIC 81  81   Pulse 78  80   Temp 97.3 °F (36.3 °C)     Resp 14     SPO2 99 %     Weight (lb)   173.06   Weight (kg)   78.5   Height 4' 8" (1.422 m)  4' 8" (1.422 m)   BMI (Calculated)   38.8   Pain Score  Zero Zero          General:  age appropriate, casually dressed, neatly groomed, scarf over hair     Musculoskeletal  Muscle Strength/Tone:  no tremor, no tic   Gait & Station:  video visit     Psychiatric  Speech:  no latency; no press, soft   Behavior: Some grimacing (pain-related behavior)   Mood & Affect:  anxious, depressed, in pain  blunted   Thought Process:  normal and logical   Associations:  intact   Thought Content:  hallucinations; denied suicidal ideation   Insight:  has awareness of illness   Judgement: behavior is adequate to circumstances   Orientation:  grossly intact   Memory: intact for content of interview   Language: grossly intact   Attention Span & Concentration:  Grossly intact   Fund of Knowledge:  intact and appropriate to age and level of education     Assessment and Diagnosis   Status/Progress: Based on the examination today, the patient's problem(s) is/are inadequately controlled, treatment resistant, and failing to respond as expected to treatment and complicated by pain.  New problems have not been presented today.   Co-morbidities and psychosocial stressors  are complicating management of the primary condition.  The working differential for this patient includes schizoaffective vs bipolar vs schizophrenia.     General Impression:     Encounter Diagnoses   Name Primary?    " "Schizoaffective disorder, bipolar type Yes    Generalized anxiety disorder        Intervention/Counseling/Treatment Plan   Medication Management: Discussed risks, benefits, and alternatives to treatment plan documented above with patient. I answered all patient questions related to this plan, and patient expressed understanding and agreement.   continue Lamictal 200 mg bid; Xanax 1 mg bid; mirtazepine 30 mg hs; Caplyta 10.5 mg; takes Cymbalta 60 mg bid from another provider  Continue with therapy    IOP       Medication List with Changes/Refills   Current Medications    ALBUTEROL (PROVENTIL) 2.5 MG /3 ML (0.083 %) NEBULIZER SOLUTION    Take 2.5 mg by nebulization every 4 (four) hours as needed. Times a day    ALBUTEROL (PROVENTIL/VENTOLIN HFA) 90 MCG/ACTUATION INHALER    Inhale 2 puffs into the lungs every 6 (six) hours as needed for Wheezing.    ALPRAZOLAM (XANAX) 1 MG TABLET    Take 1 tablet (1 mg total) by mouth 2 (two) times daily as needed for Anxiety.    AMLODIPINE-VALSARTAN (EXFORGE)  MG PER TABLET    Take 1 tablet by mouth once daily.    AMLODIPINE-VALSARTAN (EXFORGE)  MG PER TABLET    Take 1 tablet by mouth in the morning.    ATORVASTATIN (LIPITOR) 20 MG TABLET    Take 1 tablet (20 mg total) by mouth every evening    BD INSULIN SYRINGE ULTRA-FINE 1/2 ML 30 GAUGE X 1/2" SYRG        BLOOD-GLUCOSE SENSOR (DEXCOM G6 SENSOR) NHI    1 each by Misc.(Non-Drug; Combo Route) route every 10 days.    BLOOD-GLUCOSE SENSOR (DEXCOM G7 SENSOR) NHI    Use to check bg. Change every 10 days    BLOOD-GLUCOSE TRANSMITTER (DEXCOM G6 TRANSMITTER) NHI    1 each by Misc.(Non-Drug; Combo Route) route every 3 (three) months.    CELECOXIB (CELEBREX) 100 MG CAPSULE    Take 1 capsule (100 mg total) by mouth 2 (two) times daily.    CYCLOBENZAPRINE (FLEXERIL) 10 MG TABLET    Take 1 tablet (10 mg total) by mouth 3 (three) times daily as needed (Pain).    DULOXETINE (CYMBALTA) 60 MG CAPSULE    Take 1 capsule (60 mg total) by " "mouth 2 (two) times daily.    ERGOCALCIFEROL (VITAMIN D2) 50,000 UNIT CAP    Take 1 capsule twice weekly for 3 weeks then weekly    ESTRADIOL (ESTRACE) 0.01 % (0.1 MG/GRAM) VAGINAL CREAM    Place 1 g vaginally twice a week.    FENOFIBRATE (TRICOR) 145 MG TABLET    Take 1 tablet (145 mg total) by mouth once daily.    FLUTICASONE PROPIONATE (FLONASE) 50 MCG/ACTUATION NASAL SPRAY    2 sprays (100 mcg total) by Each Nare route once daily.    FROVATRIPTAN (FROVA) 2.5 MG TABLET    Take 1 tablet at onset of acute migraine. May take 1 more tablet 2 hours later if needed. (MAX 2 TABLET PER DAY. MAX 4 TABLETS PER WEEK.)    FROVATRIPTAN (FROVA) 2.5 MG TABLET    Take 1 tablet by mouth as needed for Migraine for up to 180 days. If recurs, may repeat after 2 hours. Max of 3 tabs in 24 hours.    FUROSEMIDE (LASIX) 20 MG TABLET    Take 1 tablet (20 mg total) by mouth once daily.    GLUCAGON (BAQSIMI) 3 MG/ACTUATION SPRY    1 spray by Nasal route as needed (hypoglycemia). For emergency use. May repeat 1 time after 15 minutes    INSULIN ASPART U-100 (NOVOLOG FLEXPEN U-100 INSULIN) 100 UNIT/ML (3 ML) INPN PEN    Inject 24 Units into the skin 3 (three) times daily before meals. Use per sliding scale for breakfast and dinner    INSULIN ASPART U-100 (NOVOLOG U-100 INSULIN ASPART) 100 UNIT/ML INJECTION    Inject 100 Units into the skin Every 3 (three) days. To use via insulin pump    INSULIN PUMP CART,AUTOMATED,BT (OMNIPOD 5 G6 PODS, GEN 5,) CRTG    Inject 1 each into the skin Every 3 (three) days.    INSULIN SYRINGE-NEEDLE U-100 0.3 ML 30 GAUGE X 5/16" SYRG    1 each by Misc.(Non-Drug; Combo Route) route Every 3 (three) days.    LAMOTRIGINE (LAMICTAL) 200 MG TABLET    Take 1 tablet (200 mg total) by mouth 2 (two) times daily.    LEVOCETIRIZINE (XYZAL) 5 MG TABLET    Take 1 tablet (5 mg total) by mouth every evening.    LIFITEGRAST (XIIDRA) 5 % DPET    Place 1 drop into both eyes 2 (two) times daily.    LUMATEPERONE (CAPLYTA) 10.5 MG CAP  " "  Take 1 capsule (10.5 mg total) by mouth once daily.    METOPROLOL SUCCINATE (TOPROL-XL) 25 MG 24 HR TABLET    Take 1 tablet (25 mg total) by mouth once daily.    METOPROLOL SUCCINATE (TOPROL-XL) 25 MG 24 HR TABLET    Take 1 tablet by mouth in the morning.    MIRTAZAPINE (REMERON) 30 MG TABLET    Take 1 tablet (30 mg total) by mouth every evening.    OMEPRAZOLE (PRILOSEC) 40 MG CAPSULE    Take 1 capsule (40 mg total) by mouth once daily.    PEN NEEDLE, DIABETIC (BD ULTRA-FINE MINI PEN NEEDLE) 31 GAUGE X 3/16" NDLE    Use one needle 5 times a day    PEN NEEDLE, DIABETIC (BD ULTRA-FINE MINI PEN NEEDLE) 31 GAUGE X 3/16" NDLE    Use 5 a day    POLYETHYLENE GLYCOL (GLYCOLAX) 17 GRAM/DOSE POWDER    Take 17 g by mouth once daily.    SECUKINUMAB (COSENTYX PEN, 2 PENS,) 150 MG/ML PNIJ    Inject 300 mg into the skin every 14 (fourteen) days.    SPIRONOLACTONE (ALDACTONE) 25 MG TABLET    Take 1 tablet (25 mg total) by mouth once daily.    SPIRONOLACTONE (ALDACTONE) 25 MG TABLET    Take 1 tablet by mouth in the morning.    SYMBICORT 160-4.5 MCG/ACTUATION HFAA    Inhale 2 puffs into the lungs in the morning and 2 puffs before bedtime. 2 puffs every 12 hours        Return to Clinic: as scheduled    Time spent with pt including note preparation: 15 minutes       Mariam Young, PhD, MP  Advanced Practice Medical Psychologist  [facility]  [place] [address]  [phone]   [phone] fax                          "

## 2024-06-13 ENCOUNTER — CLINICAL SUPPORT (OUTPATIENT)
Dept: DIABETES | Facility: CLINIC | Age: 54
End: 2024-06-13
Payer: MEDICAID

## 2024-06-13 DIAGNOSIS — Z79.4 TYPE 2 DIABETES MELLITUS WITH HYPERGLYCEMIA, WITH LONG-TERM CURRENT USE OF INSULIN: Primary | Chronic | ICD-10-CM

## 2024-06-13 DIAGNOSIS — E11.65 TYPE 2 DIABETES MELLITUS WITH HYPERGLYCEMIA, WITH LONG-TERM CURRENT USE OF INSULIN: Primary | Chronic | ICD-10-CM

## 2024-06-13 PROCEDURE — G0108 DIAB MANAGE TRN  PER INDIV: HCPCS | Mod: 95,,, | Performed by: DIETITIAN, REGISTERED

## 2024-06-13 NOTE — PROGRESS NOTES
Diabetes Care Specialist Virtual Visit Note     The patient location is: home in [place]  The chief complaint leading to consultation is: Diabetes  Visit type: audiovisual  Total time spent with patient: 30 min   Each patient to whom he or she provides medical services by telemedicine is:  (1) informed of the relationship between the physician and patient and the respective role of any other health care provider with respect to management of the patient; and (2) notified that he or she may decline to receive medical services by telemedicine and may withdraw from such care at any time.      Diabetes Care Specialist Follow-up Note  Author: Yara Sanchez, RD, CDE  Date: 6/13/2024    Program Intake  Reason for Diabetes Program Visit:: Intervention (DM referral 2/5/24 Elizabeth Ceron, IRASEMA)  Type of Intervention:: Individual  Education: Advanced Pump (OmniPod5 (training 2/26/24))  Current diabetes risk level:: high  In the last 12 months, have you::  (Pt denies ER/hosp visits since last appt)  Permission to speak with others about care:: no  Continuous Glucose Monitoring  Patient has CGM: Yes  Personal CGM type:: Dexcom G6  GMI Date: 06/13/24  GMI Value: 6.5 %    Lab Results   Component Value Date    HGBA1C 6.5 (H) 11/29/2023     Clinical    Problem Review  Active comorbidities affecting diabetes self-care.:  (HTN, HLD, Obesity, Polyneuropathy, Schizoaffective Bipolar type, NAFLD, IBS, GERD. Bariatric sleeve gastrectomy 6/1/21, Vit D defn, Iron defn anemia.)    Clinical Assessment  Current Diabetes Treatment: Insulin pump (Novolog via OmniPod5 (training 2/26/24). Dexcom G6.)  Have you ever experienced hypoglycemia (low blood sugar)?: yes (Pt reports one recent episide ~4-5hrs after meal, possibly due to meal delay. Noted recent decrease oral intake due social stressors. Pt saw Dr. Young for care yesterday.)  Are you able to tell when your blood sugar is low?: Yes (shaky, hot, weakl)  Have you ever been hospitalized because  your blood sugar was too low?: no  How do you treat hypoglycemia (low blood sugar)?: 1/2 can soda/fruit juice (pb crackers)  Have you ever experienced hyperglycemia (high blood sugar)?:  (Dexcom alerts; pt denies symptoms. Noted episodes related to missed meal / snack bolus.)    Medication Information  How many days a week do you miss your medications?: 3 or more  Do you sometimes have difficulty refilling your medications?: No  Medication adherence impacting ability to self-manage diabetes?: Yes    Nutritional Status  Diet:  (Irregular meal patterns. Overall decreased appetite & oral intake due social stressors. Pt saw Dr. Young for care yesterday. She reports limited financial resources for food. Dtr assists with groceries. Pt uses grocery delivery from [employer].)  Recent Changes in Weight: Weight Loss (~10 lbs since 2/24)  Current nutritional status an area of need that is impacting patient's ability to self-manage diabetes?: Yes    Physical activity/Exercise: None recent     SMBG:         Additional Social History    Support  Does anyone support you with your diabetes care?: yes  Who supports you?: self (daughter)  Is Support an area impacting ability to self-manage diabetes?: No    Access to Mass Media & Technology  Does the patient have access to any of the following devices or technologies?: Smart phone, Internet Access  Media or technology needs impacting ability to self-manage diabetes?: No    Cognitive/Behavioral Health  Alert and Oriented: Yes  Difficulty Thinking: No  Requires Prompting: No  Requires assistance for routine expression?: No  Cognitive or behavioral barriers impacting ability to self-manage diabetes?: No    Culture/Christianity  Culture or Mandaeism beliefs that may impact ability to access healthcare: No    Communication  Language preference: English  Hearing Problems: No  Vision Problems: Yes  Vision problem type:: Decreased Vision  Vision Assistance: Glasses  Communication needs impacting  ability to self-manage diabetes?: No    During today's follow-up visit,  the following areas required further assessment and content was provided/reviewed.  Based on today's diabetes care assessment, the following areas of need were identified:          6/13/2024    12:01 AM   Social   Support No   Access to Mass Media/Tech No   Cognitive/Behavioral Health No   Culture/Mormon No   Communication No          6/13/2024    12:01 AM   Clinical   Medication Adherence Yes - see care plan   Nutritional Status Yes - see care plan      Today's interventions were provided through individual discussion, instruction, and written materials were provided.    Patient verbalized understanding of instruction and written materials.  Pt was able to return back demonstration of instructions today. Patient understood key points, needs reinforcement and further instruction.     Diabetes Self-Management Care Plan Review and Evaluation of Progress:  During today's follow-up Kimberlees Diabetes Self-Management Care Plan progress was reviewed and progress was evaluated including his/her input. Yolanda has agreed to continue his/her journey to improve/maintain overall diabetes control by continuing to set health goals. See care plan progress below.      Care Plan: Diabetes Management   Updates made since 5/14/2024 12:00 AM        Problem: Medications         Goal: Patient Agrees to take Diabetes Medication(s) as prescribed.    Start Date: 2/6/2024   Expected End Date: 2/5/2025   This Visit's Progress: On track   Recent Progress: On track   Priority: High   Barriers: No Barriers Identified   Note:    Encouraged overall progress. Pt doing well using Omni5 and Dexcom G6 device management but inconsistently entering carb bolus due fear hypoglycemia. She is concerned about higher BG spikes after meals.     Reassurance provided to enter carb bolus 10-15 min or right before first bite meal to help manage excursions and prevent pp hypoglycemia. Reviewed  examples with pt from her [employer] reports.        Problem: Healthy Eating         Goal: Eat 5 small meals daily - manage carb 15-30grams/meal.    Priority: Medium   Barriers: Other (comments)   Note:    Noted smaller amt carb goal due to s/p bariatric surgery. Emphasis on role of meal spacing, carb/pro balance for adequate nutrition, BG stability. Discussed specific tips for increasing protein intake throughout day. Pt unable afford MR / pro shake but able to obtain other sources of dietary protein. She plans to contact dtr for additional support in obtaining groceries.      Pt can demonstrate accurate carb ctg from examples reviewed with her today.       Task: Review the importance of balancing carbohydrates with each meal using portion control techniques to count servings of carbohydrate and label reading to identify serving size and amount of total carbs per serving. Completed 6/13/2024          Follow Up Plan   Follow up in about 2 weeks (around 6/27/2024).  -review [employer] reports  -eval goals    Today's care plan and follow up schedule was discussed with patient.  Yolanda verbalized understanding of the care plan, goals, and agrees to follow up plan.        The patient was encouraged to communicate with his/her health care provider/physician and care team regarding his/her condition(s) and treatment.  I provided the patient with my contact information today and encouraged to contact me via phone or [facility] Patient Portal as needed.     Length of Visit   Total Time: 30 Minutes

## 2024-06-14 ENCOUNTER — PATIENT MESSAGE (OUTPATIENT)
Dept: DIABETES | Facility: CLINIC | Age: 54
End: 2024-06-14
Payer: MEDICAID

## 2024-06-21 ENCOUNTER — PATIENT MESSAGE (OUTPATIENT)
Dept: PRIMARY CARE CLINIC | Facility: CLINIC | Age: 54
End: 2024-06-21
Payer: MEDICAID

## 2024-06-25 ENCOUNTER — TELEPHONE (OUTPATIENT)
Dept: DIABETES | Facility: CLINIC | Age: 54
End: 2024-06-25
Payer: MEDICAID

## 2024-06-25 NOTE — TELEPHONE ENCOUNTER
----- Message from Gaurav Becerra sent at 6/25/2024 11:26 AM CDT -----  Type:  Patient Returning Call    Who Called:     Who Left Message for Patient:  love flanagan     Does the patient know what this is regarding?: missed call     Best Call Back Number:    [phone]       Additional Information:

## 2024-06-26 DIAGNOSIS — I10 ESSENTIAL HYPERTENSION: Chronic | ICD-10-CM

## 2024-06-26 DIAGNOSIS — K58.2 IRRITABLE BOWEL SYNDROME WITH BOTH CONSTIPATION AND DIARRHEA: ICD-10-CM

## 2024-06-26 RX ORDER — LANOLIN ALCOHOL/MO/W.PET/CERES
400 CREAM (GRAM) TOPICAL DAILY
Qty: 90 TABLET | Refills: 0 | Status: CANCELLED | OUTPATIENT
Start: 2024-06-26 | End: 2024-09-24

## 2024-06-26 NOTE — TELEPHONE ENCOUNTER
Care Due:                  Date            Visit Type   Department     Provider  --------------------------------------------------------------------------------                                ESTABLISHED   [facility] PRIMARY  Last Visit: 02-      PATIENT      CARE           Sasha Sorenson  Next Visit: None Scheduled  None         None Found                                                            Last  Test          Frequency    Reason                     Performed    Due Date  --------------------------------------------------------------------------------    Vitamin D...  12 months..  ergocalciferol...........  Not Found    Overdue    [employer] Embedded Care Due Messages. Reference number: [id number].   6/26/2024 9:15:03 AM CDT

## 2024-06-27 DIAGNOSIS — F41.1 GENERALIZED ANXIETY DISORDER: Chronic | ICD-10-CM

## 2024-06-27 RX ORDER — MIRTAZAPINE 15 MG/1
TABLET, FILM COATED ORAL
Qty: 90 TABLET | Refills: 0 | OUTPATIENT
Start: 2024-06-27

## 2024-06-27 RX ORDER — POLYETHYLENE GLYCOL 3350 17 G/17G
POWDER, FOR SOLUTION ORAL
Qty: 510 G | Refills: 0 | Status: SHIPPED | OUTPATIENT
Start: 2024-06-27

## 2024-06-27 NOTE — TELEPHONE ENCOUNTER
Refill Routing Note   Medication(s) are not appropriate for processing by [facility] for the following reason(s):        No active prescription written by provider    [facility] action(s):  Defer  Quick Discontinue        Medication Therapy Plan: QDC Remeron 15 mg - Increased to 30 mg QHS 3/22/24      Appointments  past 12m or future 3m with PCP    Date Provider   Last Visit   2/14/2024 Sasha Sorenson MD   Next Visit   Visit date not found Sasha Sorenson MD   ED visits in past 90 days: 0        Note composed:5:04 PM 06/27/2024

## 2024-06-27 NOTE — TELEPHONE ENCOUNTER
No care due was identified.  [facility] Embedded Care Due Messages. Reference number: [id number].   6/27/2024 4:48:18 PM CDT

## 2024-06-28 RX ORDER — MIRTAZAPINE 30 MG/1
30 TABLET, FILM COATED ORAL NIGHTLY
Qty: 90 TABLET | Refills: 0 | Status: SHIPPED | OUTPATIENT
Start: 2024-06-28 | End: 2024-09-26

## 2024-07-02 ENCOUNTER — TELEPHONE (OUTPATIENT)
Dept: DIABETES | Facility: CLINIC | Age: 54
End: 2024-07-02
Payer: MEDICAID

## 2024-07-09 ENCOUNTER — PATIENT MESSAGE (OUTPATIENT)
Dept: ADMINISTRATIVE | Facility: HOSPITAL | Age: 54
End: 2024-07-09
Payer: MEDICAID

## 2024-07-11 ENCOUNTER — OFFICE VISIT (OUTPATIENT)
Dept: PAIN MEDICINE | Facility: CLINIC | Age: 54
End: 2024-07-11
Payer: MEDICAID

## 2024-07-11 VITALS
RESPIRATION RATE: 17 BRPM | DIASTOLIC BLOOD PRESSURE: 80 MMHG | HEIGHT: 56 IN | BODY MASS INDEX: 42.5 KG/M2 | WEIGHT: 188.94 LBS | SYSTOLIC BLOOD PRESSURE: 128 MMHG | HEART RATE: 94 BPM

## 2024-07-11 DIAGNOSIS — M45.9 ANKYLOSING SPONDYLITIS, UNSPECIFIED SITE OF SPINE: ICD-10-CM

## 2024-07-11 DIAGNOSIS — G89.29 CHRONIC MYOFASCIAL PAIN: ICD-10-CM

## 2024-07-11 DIAGNOSIS — M47.812 CERVICAL SPONDYLOSIS: ICD-10-CM

## 2024-07-11 DIAGNOSIS — M54.12 CERVICAL RADICULOPATHY: ICD-10-CM

## 2024-07-11 DIAGNOSIS — M79.18 CHRONIC MYOFASCIAL PAIN: ICD-10-CM

## 2024-07-11 DIAGNOSIS — M79.7 FIBROMYALGIA: Primary | ICD-10-CM

## 2024-07-11 PROCEDURE — 3008F BODY MASS INDEX DOCD: CPT | Mod: CPTII,,, | Performed by: PHYSICAL MEDICINE & REHABILITATION

## 2024-07-11 PROCEDURE — 3079F DIAST BP 80-89 MM HG: CPT | Mod: CPTII,,, | Performed by: PHYSICAL MEDICINE & REHABILITATION

## 2024-07-11 PROCEDURE — 4010F ACE/ARB THERAPY RXD/TAKEN: CPT | Mod: CPTII,,, | Performed by: PHYSICAL MEDICINE & REHABILITATION

## 2024-07-11 PROCEDURE — 3074F SYST BP LT 130 MM HG: CPT | Mod: CPTII,,, | Performed by: PHYSICAL MEDICINE & REHABILITATION

## 2024-07-11 PROCEDURE — 3044F HG A1C LEVEL LT 7.0%: CPT | Mod: CPTII,,, | Performed by: PHYSICAL MEDICINE & REHABILITATION

## 2024-07-11 PROCEDURE — 99999 PR PBB SHADOW E&M-EST. PATIENT-LVL IV: CPT | Mod: PBBFAC,,, | Performed by: PHYSICAL MEDICINE & REHABILITATION

## 2024-07-11 PROCEDURE — 99214 OFFICE O/P EST MOD 30 MIN: CPT | Mod: PBBFAC | Performed by: PHYSICAL MEDICINE & REHABILITATION

## 2024-07-11 PROCEDURE — 1159F MED LIST DOCD IN RCRD: CPT | Mod: CPTII,,, | Performed by: PHYSICAL MEDICINE & REHABILITATION

## 2024-07-11 PROCEDURE — 99214 OFFICE O/P EST MOD 30 MIN: CPT | Mod: S$PBB,,, | Performed by: PHYSICAL MEDICINE & REHABILITATION

## 2024-07-11 PROCEDURE — G2211 COMPLEX E/M VISIT ADD ON: HCPCS | Mod: S$PBB,,, | Performed by: PHYSICAL MEDICINE & REHABILITATION

## 2024-07-11 RX ORDER — LIDOCAINE 50 MG/G
2 PATCH TOPICAL EVERY 12 HOURS PRN
Qty: 60 PATCH | Refills: 11 | Status: SHIPPED | OUTPATIENT
Start: 2024-07-11

## 2024-07-11 RX ORDER — CELECOXIB 100 MG/1
100 CAPSULE ORAL 2 TIMES DAILY
Qty: 60 CAPSULE | Refills: 2 | Status: SHIPPED | OUTPATIENT
Start: 2024-07-11

## 2024-07-11 NOTE — PROGRESS NOTES
Chronic Pain Established Note (Follow up visit)      Chronic generalized pain.        SUBJECTIVE:  Interval History (7/11/2024):    Yolanda Betts is a 54 y.o. female presents today for follow-up chronic pain.  She continues to utilize Celebrex, Cymbalta, and lidocaine patches as prescribed.  She reports her pain is 8/10 and states that it is a generalized pain throughout her entire body.  She denies any new injuries or trauma in interim.  She also continues to utilize Cosentyx for her ankylosing spondylitis in his following up with rheumatology regularly.  She also has been taking Flexeril up to 2 times a day as needed for her myofascial pain with fair results.      Interval HPI 03/27/2024:  Patient Yolanda Betts presents today for follow-up visit.  Patient in for follow-up of cervical pain.  She has currently not having any pain radiating into the upper extremities but states at times her pain will radiate down her back.  She rates her pain today an 8/10.  She has been attending physical therapy for 4-5 weeks and is not getting any relief.  She has been attending occupational therapy for hand strength and does report this is getting better.  She has had cervical ROCAEL as well as medial branch block with minimal or short duration of relief in the past.  She is tried multiple medications for her symptoms.  She followed up with Rheumatology this week who DC her tizanidine and started her on Flexeril as well as a Medrol Dosepak for when she has an acute flare-up.    Interval History (2/26/2024):  Patient Yolanda Betts presents today for follow-up visit.  Since last appt she has had:  1/30/2024 C5/6 IL ROCAEL relief x 1 week, now at baseline.   12/19/2023 Yusef C5-7 MBB no relief.  She still complains of cervical pain which radiates into the bilateral shoulders.  She rates her pain today an 8/10.  She is tried multiple medications in the past with either side effects or no relief including Topamax,  gabapentin, Lyrica.  She is currently taking Cymbalta and tizanidine.  She has done physical therapy in the past which has worked really well for her and she is interested in resuming physical therapy program.  No new weakness into the arms or changes in neurological symptoms.    Interval history 12/1/2023  Yolanda Betts presents to tele-medicine appointment for a follow-up appointment for neck pain. Since the last visit, Yolanda Betts states the pain has been worsening. Current pain intensity is 10/10.  She locates the majority of pain to the cervical myofascial region with radiation into her shoulder blade area.  She denies any radiation into the upper extremities.  Previous cervical ROCAEL provided 50% relief for only about 3 weeks.    Interval History (1/17/2023):  Yolanda Betts presents today for follow-up visit.  Patient was last seen on 05/09/2022. At that visit, the plan was to discontinue Savella and Flexeril, continue Cymbalta 60 mg once daily, and begin Tizanidine. She reports she had a slip and fall on Sunday in her tub. Denies any dizziness or LOC before fall, just slipped. Able to get up. Sore all over since then. Patient reports pain as 10/10 today. MRI of cervical spine ordered by Dr. Kirkpatrick, who thinks shoulder pain is stemming more from neck. No injections in shoulder. Pain begins in neck and radiates into both arms and down spine. Reports nothing helps her pain. Requesting referral for medicinal marijuana as she heard that this may be beneficial.        Interval HPI 05/09/2022  Yolanda Betts is a 52 y.o. female presents today for tele medicine follow-up.  She was recently seen on 03/07/2022.  At that visit, she was provided with a Medrol Dosepak and advised to continue with topical analgesics along with Savella, Cymbalta, and Flexeril p.r.n..  She reports less than 10% relief with this current medication regimen and is having poor sleep.     Interval History (3/7/2022):     Yolanda Betts presents today for follow-up visit.  Patient was last seen about 6 months ago.  She doesn't feel pain is controlled currently, but she does not feel like she can go up on the Savella because she is concerned about drowsiness.  She continues to take the Flexeril twice a day.  She alternates with Voltaren gel and lidocaine cream, which helps somewhat.  She continues to follow-up with orthopedics and had a right shoulder injection on 02/07/2022 with some pain relief.  Patient reports pain as 10/10 today.     Interval History (9/17/2021):  Yolanda Betts presents today on telemedicine visit.  Patient was last seen on 03/23/2021 with Dr. Hay. At that visit, she was on Cymbalta 60 mg twice a day, which she is now only taking once a day.  She is also taking Savella, and she inquires about an increase.  Patient reports pain as 8/10 today.     History of Present Illness:   This patient is a 50 y.o. female who presents today complaining of the above noted pain/s. The patient describes the pain as follows.  Ms. Betts his new patient clinic with complaints of bilateral shoulders, bilateral hips, thoracic and lumbar spine pain.  She has a history of fibromyalgia reports that this pain feels different from the fibromyalgia.  She has been diagnosed with fibromyalgiafor approximately 10 years has been having these joint complaints for approximately last 5 years.  She describes a sharp throbbing sensation which is worse with movement and she has been unable find anything that provides pain relief.  She has tried numerous different medications including Aleve, Tylenol, Robaxin, tizanidine, Advil, gabapentin, Flexeril, Lyrica, Cymbalta, baclofen in addition to currently participating in physical therapy.  She has had shoulder joint injections in the past which helped for short period of time but denies having had hip injections.  She denies having had surgery on any of these joints.  She reports that  anti-inflammatory medications cause her blood pressure to increase therefore she has to avoid them.     Yolanda Betts is a 52 y.o. female  who is presenting with a chief complaint of lumbar back pain . The patient began experiencing this problem insidiously, and the pain has been gradually worsening over the past 5 year(s). The pain is described as throbbing, cramping, aching and heavy and is located in the bilateral lumbar spine . Pain is intermittent and lasts hours. The  pain is nonradiating. The patient rates her pain a 7 out of ten and interferes with activities of daily living a 6 out of ten. Pain is exacerbated by flexion/ extension of the lumbar spine, and is improved by rest. Patient reports prior trauma, no prior spinal surgery      - pertinent negatives: No fever, No chills, No weight loss, No bladder dysfunction, No bowel dysfunction, No saddle anesthesia  - pertinent positives: none    - medications, other therapies tried (physical therapy, injections):     >> NSAIDs, Tylenol, Tramadol, gabapentin, lyrica, zanaflex and flexeril    >> Has previously undergone Physical Therapy      And procedures:   -C6-7 IL ROCAEL on 01/31/2023, 50% relief for 3 weeks   1/30/2024 C5/6 IL ROCAEL relief x 1 week, now at baseline.   12/19/2023 Yusef C5-7 MBB no relief.     Imaging / Labs / Studies (reviewed on 07/11/2024):     MRI cervical spine 01/03/2023  FINDINGS:  The vertebral body heights and alignment are maintained throughout the cervical spine.     No abnormal vertebral body marrow signal.     Multilevel intervertebral disc desiccation.     No abnormal signal at the cervicomedullary junction or cervical spinal cord.     C2-3: Normal intervertebral disc contour. No central canal or neural foraminal narrowing.     C3-4: Normal intervertebral disc contour.  Bilateral uncovertebral and facet hypertrophy.  No central canal or neural foraminal narrowing.     C4-5: Intervertebral disc bulge.  Bilateral uncovertebral and  facet hypertrophy.  No central canal narrowing.  Mild bilateral neural foraminal narrowing.     C5-6: Intervertebral disc bulge.  Bilateral uncovertebral and facet hypertrophy.  Minimal bilateral neural foraminal narrowing.  No central canal narrowing.     C6-7: Intervertebral disc bulge.  No central canal or neural foraminal narrowing.     C7-T1: Normal intervertebral disc contour. No central canal or neural foraminal narrowing.     The paravertebral soft tissues are unremarkable.     Impression:     At C4-5, there is mild intervertebral disc bulge with uncovertebral and facet hypertrophy mildly narrowing the bilateral neural foramina.     At C5-6, there is an intervertebral disc bulge with uncovertebral and facet hypertrophy minimally narrowing the bilateral neural foramina.      11/13/2020 X-Ray Lumbar Spine Ap And Lateral  COMPARISON:  Lumbar spine radiographs August 18, 2020     FINDINGS:  Alignment of the lumbar spine is normal without listhesis.  No fracture.  No definite pars defect.  No suspicious osseous lesion.  Intervertebral disc spaces of the lumbar spine are maintained.  Inferior lumbar spine facet arthropathy is noted.  Sacroiliac joints are unremarkable.  Multiple phleboliths are present within the pelvis.        1/14/21 X-Ray Cervical Spine Complete 5 view  COMPARISON:  August 18, 2020     FINDINGS:  Visualization of C7-T1 level on the lateral view.  Vertebral height and alignment maintained with straightening of the normal curvature.  This can be seen with positioning as well as spasm and/or strain.  Prevertebral soft tissues, C1-2 articulation and odontoid normal in appearance allowing for positioning.  Pre dens space normal.  Neural foramina at widely patent at all levels bilaterally.     Remaining included osseous structures intact.  Visualized upper lung fields clear.     Impression  Straightening of normal C-spine curvature.  This can be seen with positioning as well as spasm and/or strain.    "  No acute fracture or subluxation.  Follow-up and or further evaluation as warranted.       PMHx,PSHx, Social history, and Family history:  I have reviewed the patient's medical, surgical, social, and family history in detail and updated the computerized patient record.    Review of patient's allergies indicates:   Allergen Reactions    Codeine Itching    Hydromorphone Other (See Comments)     Can't wake up for long time if taken    Slow to wake up after surgery after receiving    Aleve [naproxen sodium]      Increases BP    Ibuprofen      Increases BP    Neuromuscular blockers, steroidal Hives     some    Pregabalin Itching    Latex, natural rubber Rash    Morphine Rash     itching    Norco [hydrocodone-acetaminophen] Itching, Rash and Hallucinations    Secobarbital sodium Rash     itching    Tylox [oxycodone-acetaminophen] Rash       Current Outpatient Medications   Medication Sig    albuterol (PROVENTIL) 2.5 mg /3 mL (0.083 %) nebulizer solution Take 2.5 mg by nebulization every 4 (four) hours as needed. Times a day    albuterol (PROVENTIL/VENTOLIN HFA) 90 mcg/actuation inhaler Inhale 2 puffs into the lungs every 6 (six) hours as needed for Wheezing.    ALPRAZolam (XANAX) 1 MG tablet Take 1 tablet (1 mg total) by mouth 2 (two) times daily as needed for Anxiety.    amlodipine-valsartan (EXFORGE)  mg per tablet Take 1 tablet by mouth once daily.    amlodipine-valsartan (EXFORGE)  mg per tablet Take 1 tablet by mouth in the morning.    atorvastatin (LIPITOR) 20 MG tablet Take 1 tablet (20 mg total) by mouth every evening    BD INSULIN SYRINGE ULTRA-FINE 1/2 mL 30 gauge x 1/2" Syrg     blood-glucose sensor (DEXCOM G6 SENSOR) Mariela 1 each by Misc.(Non-Drug; Combo Route) route every 10 days.    blood-glucose sensor (DEXCOM G7 SENSOR) Mariela Use to check bg. Change every 10 days    blood-glucose transmitter (DEXCOM G6 TRANSMITTER) Mariela 1 each by Misc.(Non-Drug; Combo Route) route every 3 (three) months.    " "cyclobenzaprine (FLEXERIL) 10 MG tablet Take 1 tablet (10 mg total) by mouth 3 (three) times daily as needed (Pain).    DULoxetine (CYMBALTA) 60 MG capsule Take 1 capsule (60 mg total) by mouth 2 (two) times daily.    ergocalciferol (VITAMIN D2) 50,000 unit Cap Take 1 capsule twice weekly for 3 weeks then weekly    estradioL (ESTRACE) 0.01 % (0.1 mg/gram) vaginal cream Place 1 g vaginally twice a week.    fluconazole (DIFLUCAN) 100 MG tablet Take 1 tablet by mouth in the morning for 14 days    fluticasone propionate (FLONASE) 50 mcg/actuation nasal spray 2 sprays (100 mcg total) by Each Nare route once daily.    frovatriptan (FROVA) 2.5 MG tablet Take 1 tablet at onset of acute migraine. May take 1 more tablet 2 hours later if needed. (MAX 2 TABLET PER DAY. MAX 4 TABLETS PER WEEK.)    frovatriptan (FROVA) 2.5 MG tablet Take 1 tablet by mouth as needed for Migraine for up to 180 days. If recurs, may repeat after 2 hours. Max of 3 tabs in 24 hours.    furosemide (LASIX) 20 MG tablet Take 1 tablet (20 mg total) by mouth once daily.    glucagon (BAQSIMI) 3 mg/actuation Spry 1 spray by Nasal route as needed (hypoglycemia). For emergency use. May repeat 1 time after 15 minutes    insulin pump cart,automated,BT (OMNIPOD 5 G6 PODS, GEN 5,) Crtg Inject 1 each into the skin Every 3 (three) days.    insulin syringe-needle U-100 0.3 mL 30 gauge x 5/16" Syrg 1 each by Misc.(Non-Drug; Combo Route) route Every 3 (three) days.    lamoTRIgine (LAMICTAL) 200 MG tablet Take 1 tablet (200 mg total) by mouth 2 (two) times daily.    levocetirizine (XYZAL) 5 MG tablet Take 1 tablet (5 mg total) by mouth every evening.    lifitegrast (XIIDRA) 5 % Dpet Place 1 drop into both eyes 2 (two) times daily.    metoprolol succinate (TOPROL-XL) 25 MG 24 hr tablet Take 1 tablet (25 mg total) by mouth once daily.    metoprolol succinate (TOPROL-XL) 25 MG 24 hr tablet Take 1 tablet by mouth in the morning.    mirtazapine (REMERON) 30 MG tablet Take 1 " "tablet (30 mg total) by mouth every evening.    omeprazole (PRILOSEC) 40 MG capsule Take 1 capsule (40 mg total) by mouth once daily.    pen needle, diabetic (BD ULTRA-FINE MINI PEN NEEDLE) 31 gauge x 3/16" Ndle Use one needle 5 times a day    pen needle, diabetic (BD ULTRA-FINE MINI PEN NEEDLE) 31 gauge x 3/16" Ndle Use 5 a day    polyethylene glycol (GLYCOLAX) 17 gram/dose powder MIX 17 GRAMS (1 CAPFUL) IN 8 OUNCES OF WATER AND DRINK TWICE DAILY    secukinumab (COSENTYX PEN, 2 PENS,) 150 mg/mL PnIj Inject 300 mg into the skin every 14 (fourteen) days.    spironolactone (ALDACTONE) 25 MG tablet Take 1 tablet (25 mg total) by mouth once daily.    spironolactone (ALDACTONE) 25 MG tablet Take 1 tablet by mouth in the morning.    SYMBICORT 160-4.5 mcg/actuation HFAA Inhale 2 puffs into the lungs in the morning and 2 puffs before bedtime. 2 puffs every 12 hours    celecoxib (CELEBREX) 100 MG capsule Take 1 capsule (100 mg total) by mouth 2 (two) times daily.    fenofibrate (TRICOR) 145 MG tablet Take 1 tablet (145 mg total) by mouth once daily.    insulin aspart U-100 (NOVOLOG U-100 INSULIN ASPART) 100 unit/mL injection Inject 100 Units into the skin Every 3 (three) days. To use via insulin pump    LIDOcaine (LIDODERM) 5 % Place 2 patches onto the skin every 12 (twelve) hours as needed (pain). Remove & Discard patch within 12 hours or as directed by MD     No current facility-administered medications for this visit.     Facility-Administered Medications Ordered in Other Visits   Medication    ondansetron injection 4 mg       REVIEW OF SYSTEMS:    GENERAL:  No weight loss, malaise or fevers.  HEENT:   No recent changes in vision or hearing  NECK:  Negative for lumps, no difficulty with swallowing.  RESPIRATORY:  Negative for cough, wheezing or shortness of breath, patient denies any recent URI.  CARDIOVASCULAR:  Negative for chest pain, leg swelling or palpitations.  GI:  Negative for abdominal discomfort, blood in " "stools or black stools or change in bowel habits.  MUSCULOSKELETAL:  See HPI.  SKIN:  Negative for lesions, rash, and itching.  PSYCH:  No mood disorder or recent psychosocial stressors.  Patients sleep is not disturbed secondary to pain.  HEMATOLOGY/LYMPHOLOGY:  Negative for prolonged bleeding, bruising easily or swollen nodes.  Patient is not currently taking any anti-coagulants  NEURO:   No history of headaches, syncope, paralysis, seizures or tremors.  All other reviewed and negative other than HPI.    OBJECTIVE:  /80   Pulse 94   Resp 17   Ht 4' 8" (1.422 m)   Wt 85.7 kg (188 lb 15 oz)   BMI 42.36 kg/m²      Physical Exam:   General: Alert and oriented, in no apparent distress.  Gait: normal gait.  Skin: No rashes, No discoloration, No obvious lesions  HEENT: Normocephalic, atraumatic. Pupils equal and round.  Cardiovascular: Regular rate and rhythm , no significant peripheral edema present  Respiratory: Without audible wheezing, without use of accessory muscles of respiration.     Musculoskeletal:     Cervical Spine     - Pain on flexion of cervical spine Present  - Spurling's Test:  equivocal bilaterally     - Pain on extension of cervical spine Present  - TTP over the cervical facet joints Present  - Cervical facet loading Present     -TTP over trapezius bilaterally with allodynia, grabbed practitioners hands during exam  -TTP over rhomboid bilaterally with allodynia        Lumbar Spine     - Pain on flexion of lumbar spine Absent  - Straight Leg Raise:  Absent     - Pain on extension of lumbar spine Present  - TTP over the lumbar facet joints Present  - Lumbar facet loading Present     -Pain on palpation over the SI joint  Present  - LONG: Absent        Neuro:     Strength:  UE R/L: D: 5/5; B: 5/5; T: 5/5; WF: 5/5; WE: 5/5; IO: 5/5;  LE R/L: HF: 5/5, HE: 5/5, KF: 5/5; KE: 5/5; FE: 5/5; FF: 5/5     Extremity Reflexes: Brisk and symmetric throughout.      Extremity Sensory: Sensation to pinprick " and temperature symmetric. Proprioception intact.      Psych:  Mood and affect is appropriate       LABS:  Lab Results   Component Value Date    WBC 6.94 03/11/2024    HGB 11.9 (L) 03/11/2024    HCT 38.6 03/11/2024    MCV 83 03/11/2024     03/11/2024       CMP  Sodium   Date Value Ref Range Status   03/11/2024 139 136 - 145 mmol/L Final     Potassium   Date Value Ref Range Status   03/11/2024 4.9 3.5 - 5.1 mmol/L Final     Chloride   Date Value Ref Range Status   03/11/2024 107 95 - 110 mmol/L Final     CO2   Date Value Ref Range Status   03/11/2024 27 23 - 29 mmol/L Final     Glucose   Date Value Ref Range Status   03/11/2024 94 70 - 110 mg/dL Final     BUN   Date Value Ref Range Status   03/11/2024 13 6 - 20 mg/dL Final     Creatinine   Date Value Ref Range Status   03/11/2024 0.8 0.5 - 1.4 mg/dL Final     Calcium   Date Value Ref Range Status   03/11/2024 9.8 8.7 - 10.5 mg/dL Final     Total Protein   Date Value Ref Range Status   03/11/2024 7.3 6.0 - 8.4 g/dL Final     Albumin   Date Value Ref Range Status   03/11/2024 4.2 3.5 - 5.2 g/dL Final     Total Bilirubin   Date Value Ref Range Status   03/11/2024 0.3 0.1 - 1.0 mg/dL Final     Comment:     For infants and newborns, interpretation of results should be based  on gestational age, weight and in agreement with clinical  observations.    Premature Infant recommended reference ranges:  Up to 24 hours.............<8.0 mg/dL  Up to 48 hours............<12.0 mg/dL  3-5 days..................<15.0 mg/dL  6-29 days.................<15.0 mg/dL       Alkaline Phosphatase   Date Value Ref Range Status   03/11/2024 92 55 - 135 U/L Final     AST   Date Value Ref Range Status   03/11/2024 15 10 - 40 U/L Final     ALT   Date Value Ref Range Status   03/11/2024 22 10 - 44 U/L Final     Anion Gap   Date Value Ref Range Status   03/11/2024 5 (L) 8 - 16 mmol/L Final     eGFR if    Date Value Ref Range Status   03/08/2022 >60.0 >60 mL/min/1.73 m^2 Final      eGFR if non    Date Value Ref Range Status   03/08/2022 >60.0 >60 mL/min/1.73 m^2 Final     Comment:     Calculation used to obtain the estimated glomerular filtration  rate (eGFR) is the CKD-EPI equation.          Lab Results   Component Value Date    HGBA1C 6.6 (H) 06/27/2024             ASSESSMENT: 54 y.o. year old female with neck pain, consistent with     1. Fibromyalgia        2. Cervical spondylosis        3. Cervical radiculopathy        4. Chronic myofascial pain        5. Ankylosing spondylitis, unspecified site of spine                    PLAN:   - Interventional:  None at this time    Medications:  -Continue  Cymbalta 60 mg b.i.d. per rheumatology  -continue Cosentyx per rheumatology   -we will refill for Lidoderm patches to apply to painful areas as needed Q 12 p.r.n.  -rheumatology Dced tizanidine and changed her to flexeril  - Patient has failed Gabapentin, Lyrica, topamax.    -Will continue celebrex 100mg BID. Do not combine with other NSAIDs such as ibuprofen, aleve, advil. Take with food. If any GI upset, stop medication and contact office.   We have previously discussed potential deleterious side effects of NSAIDs on the cardiovascular, gastrointestinal and renal systems. We have discussed judicious use of this medication.    She will monitor her BP at home daily- she has a machine at home. If averages >140/90 she will stop medication and contact PCP  -Referral previously sent to Dr. Pelon Drew for Medicinal Marijuana     - Rehabilitative: Encouraged regular exercise. Continue exercises and activities as tolerated.      - Diagnostic: Cervical CT and MRI reviewed     - Follow up:  Three-month or as needed     - This condition does not require this patient to take time off of work, and the primary goal of our Pain Management services is to improve the patient's functional capacity.      - I discussed the risks, benefits, and alternatives to potential treatment options. All  questions and concerns were fully addressed today in clinic.     The above plan and management options were discussed at length with patient. Patient is in agreement with the above and verbalized understanding.    Visit today included increased complexity associated with the care of the episodic problem of chronic pain which was addressed and continue to manage the longitudinal care of the patient due to the serious and/or complex managed problem(s) listed above.      Otto Phillips MD  Interventional Pain Management  [facility]    Disclaimer:  This note was prepared using voice recognition system and is likely to have sound alike errors that may have been overlooked even after proof reading.  Please call me with any questions

## 2024-07-15 ENCOUNTER — OFFICE VISIT (OUTPATIENT)
Dept: RHEUMATOLOGY | Facility: CLINIC | Age: 54
End: 2024-07-15
Payer: MEDICAID

## 2024-07-15 ENCOUNTER — TELEPHONE (OUTPATIENT)
Dept: HEPATOLOGY | Facility: CLINIC | Age: 54
End: 2024-07-15
Payer: MEDICAID

## 2024-07-15 VITALS
HEIGHT: 56 IN | BODY MASS INDEX: 42.92 KG/M2 | DIASTOLIC BLOOD PRESSURE: 84 MMHG | HEART RATE: 90 BPM | SYSTOLIC BLOOD PRESSURE: 130 MMHG | WEIGHT: 190.81 LBS

## 2024-07-15 DIAGNOSIS — I10 ESSENTIAL HYPERTENSION: Chronic | ICD-10-CM

## 2024-07-15 DIAGNOSIS — M79.7 FIBROMYALGIA: ICD-10-CM

## 2024-07-15 DIAGNOSIS — B37.0 THRUSH: ICD-10-CM

## 2024-07-15 DIAGNOSIS — M45.9 ANKYLOSING SPONDYLITIS, UNSPECIFIED SITE OF SPINE: Primary | ICD-10-CM

## 2024-07-15 PROCEDURE — 3075F SYST BP GE 130 - 139MM HG: CPT | Mod: CPTII,,, | Performed by: STUDENT IN AN ORGANIZED HEALTH CARE EDUCATION/TRAINING PROGRAM

## 2024-07-15 PROCEDURE — 99999 PR PBB SHADOW E&M-EST. PATIENT-LVL V: CPT | Mod: PBBFAC,,, | Performed by: STUDENT IN AN ORGANIZED HEALTH CARE EDUCATION/TRAINING PROGRAM

## 2024-07-15 PROCEDURE — 3044F HG A1C LEVEL LT 7.0%: CPT | Mod: CPTII,,, | Performed by: STUDENT IN AN ORGANIZED HEALTH CARE EDUCATION/TRAINING PROGRAM

## 2024-07-15 PROCEDURE — 3008F BODY MASS INDEX DOCD: CPT | Mod: CPTII,,, | Performed by: STUDENT IN AN ORGANIZED HEALTH CARE EDUCATION/TRAINING PROGRAM

## 2024-07-15 PROCEDURE — 3079F DIAST BP 80-89 MM HG: CPT | Mod: CPTII,,, | Performed by: STUDENT IN AN ORGANIZED HEALTH CARE EDUCATION/TRAINING PROGRAM

## 2024-07-15 PROCEDURE — 4010F ACE/ARB THERAPY RXD/TAKEN: CPT | Mod: CPTII,,, | Performed by: STUDENT IN AN ORGANIZED HEALTH CARE EDUCATION/TRAINING PROGRAM

## 2024-07-15 PROCEDURE — 99215 OFFICE O/P EST HI 40 MIN: CPT | Mod: PBBFAC | Performed by: STUDENT IN AN ORGANIZED HEALTH CARE EDUCATION/TRAINING PROGRAM

## 2024-07-15 PROCEDURE — 1159F MED LIST DOCD IN RCRD: CPT | Mod: CPTII,,, | Performed by: STUDENT IN AN ORGANIZED HEALTH CARE EDUCATION/TRAINING PROGRAM

## 2024-07-15 PROCEDURE — 1160F RVW MEDS BY RX/DR IN RCRD: CPT | Mod: CPTII,,, | Performed by: STUDENT IN AN ORGANIZED HEALTH CARE EDUCATION/TRAINING PROGRAM

## 2024-07-15 PROCEDURE — 99215 OFFICE O/P EST HI 40 MIN: CPT | Mod: S$PBB,,, | Performed by: STUDENT IN AN ORGANIZED HEALTH CARE EDUCATION/TRAINING PROGRAM

## 2024-07-15 RX ORDER — FLUCONAZOLE 100 MG/1
TABLET ORAL
Qty: 14 TABLET | Refills: 0 | Status: SHIPPED | OUTPATIENT
Start: 2024-07-15

## 2024-07-15 NOTE — TELEPHONE ENCOUNTER
Returned call and provided Nichelle with the fax number to send the document that needed to be signed by the provider.

## 2024-07-15 NOTE — TELEPHONE ENCOUNTER
----- Message from Rolando Kent sent at 7/15/2024 10:00 AM CDT -----  Contact: Nichelle/Jose Steward with [employer] is calling to follow up on a fax that was sent over with forms that are needing to be filled out for the patient. Please give her a call back at [phone] Reference number [id number]

## 2024-07-15 NOTE — PROGRESS NOTES
RHEUMATOLOGY CLINIC ESTABLISHED PATIENT VISIT    Reason for consult:- ankylosing spondylitis; fibromyalgia    Chief complaints, HPI, ROS, EXAM, Assessment & Plans:-    Yolanda Betts is a 54 y.o. pleasant female who presents to follow up from her previous visit March for management of ankylosing spondylitis and fibromyalgia.  In the interim she has increased Cosentyx to 300 mg  every 14 days.  This does seem to be helping a good bit.  However she has noticed development of thrush.  Previously treated with course of Diflucan which resolved it.  However she feels coming back.  Does note that she has been more stress in her blood sugars has been elevated.  Seen primary care to address.  Continues to take Cymbalta and Flexeril.  Does note brief episodes of trouble with her balance.  Denies vertigo or lightheadedness.  No new symptoms otherwise.  Rheumatologic review of systems negative.  Physical exam is unremarkable.    Reviewed all available old and outside pertinent medical records.    All lab results personally reviewed and interpreted by me.    1. Ankylosing spondylitis, unspecified site of spine    2. Fibromyalgia    3. Thrush        Problem List Items Addressed This Visit          Orthopedic    Fibromyalgia (Chronic)    Relevant Orders    Ambulatory referral/consult to Physical/Occupational Therapy    Ankylosing spondylitis - Primary    Relevant Orders    Ambulatory referral/consult to Physical/Occupational Therapy     Other Visit Diagnoses       Thrush        Relevant Medications    fluconazole (DIFLUCAN) 100 MG tablet            Patient following up today for management of ankylosing spondylitis currently treated with Cosentyx  Doing better since increasing Cosentyx to 300 mg every 14 weeks  Continue Cymbalta and Flexeril for fibromyalgia symptoms  Referral to aquatherapy  Discuss blood sugar with PCP  Drug therapy requiring intensive monitoring for toxicity  High Risk Medication Monitoring encounter  No  current medication related issues, no evidence of toxicity  Appropriate labs ordered for toxicity monitoring  Compromised immune system secondary to autoimmune disease and/or use of immunosuppressive drugs.  Monitor carefully for infections.  Advised patient to get immediate medical care if any infection arises.  Also advised strict adherence age-appropriate vaccinations and cancer screenings with PCP.  Patient advised to hold DMARD and/or biologic therapy for signs of infection or for surgery. If you are unsure what to do please call our office for instruction.[facility] [phone]      # No follow-ups on file.    Chronic comorbid conditions affecting medical decision making today:    Past Medical History:   Diagnosis Date    Abnormal Pap smear of cervix     HPV genital warts    Anemia     Anxiety     Arthritis     Asthma 10/11/2016    Bipolar 1 disorder     Diabetes mellitus, type 2     Dyslipidemia associated with type 2 diabetes mellitus 05/13/2019    General anesthetics causing adverse effect in therapeutic use     Genital warts     GERD (gastroesophageal reflux disease)     Herpes simplex virus (HSV) infection     Hyperlipidemia     Hypertension     Hypertension complicating diabetes 05/05/2019    Migraine with aura and without status migrainosus, not intractable 03/21/2016    Mild persistent asthma without complication 10/11/2016    Morbid obesity with body mass index (BMI) of 45.0 to 49.9 in adult 08/03/2017    Obstructive sleep apnea     ALEN (obstructive sleep apnea)     2L per N/C q HS    Schizoaffective disorder, bipolar type 04/25/2019    Seasonal allergic rhinitis due to pollen 05/13/2019    Type 2 diabetes mellitus with hyperglycemia, with long-term current use of insulin 12/21/2017    Type 2 diabetes mellitus with hyperglycemia, without long-term current use of insulin 12/21/2017       Past Surgical History:   Procedure Laterality Date    abcess removed right back      BELT  ABDOMINOPLASTY  1996    BREAST SURGERY Bilateral     Reduction    CARPAL TUNNEL RELEASE Bilateral 2023    Procedure: RELEASE, CARPAL TUNNEL;  Surgeon: Neville Roth MD;  Location: [facility] OR;  Service: Orthopedics;  Laterality: Bilateral;  bilateral carpal tunnel release     SECTION      X 2    COLONOSCOPY      COLONOSCOPY N/A 2018    Procedure: colonoscopy for iron deficiency anemia;  Surgeon: Frankie Sanchez MD;  Location: [facility];  Service: Endoscopy;  Laterality: N/A;    COLONOSCOPY N/A 2018    Procedure: COLONOSCOPY;  Surgeon: Frankie Sanchez MD;  Location: [facility];  Service: Endoscopy;  Laterality: N/A;    COLONOSCOPY N/A 3/10/2023    Procedure: COLONOSCOPY;  Surgeon: Lalita Montes De Oca MD;  Location: [facility];  Service: Endoscopy;  Laterality: N/A;    COLONOSCOPY N/A 2024    Procedure: COLONOSCOPY;  Surgeon: Tara Og MD;  Location: [facility];  Service: Endoscopy;  Laterality: N/A;    EPIDURAL STEROID INJECTION INTO CERVICAL SPINE N/A 2023    Procedure: C6/7 IL ROCAEL RN IV Sedation;  Surgeon: Otto Phillips MD;  Location: [facility] PAIN MGT;  Service: Pain Management;  Laterality: N/A;    EPIDURAL STEROID INJECTION INTO CERVICAL SPINE N/A 2024    Procedure: C5/6 IL ROCAEL;  Surgeon: Otto Phillips MD;  [facility];  Service: Pain Management;  Laterality: N/A;    ESOPHAGOGASTRODUODENOSCOPY N/A 3/10/2023    Procedure: EGD (ESOPHAGOGASTRODUODENOSCOPY);  Surgeon: Lalita Montes De Oca MD;  Location: [facility];  Service: Endoscopy;  Laterality: N/A;    HYSTERECTOMY      w/ BSO; hypermenorrhea    INJECTION OF ANESTHETIC AGENT AROUND MEDIAL BRANCH NERVES INNERVATING CERVICAL FACET JOINT Bilateral 2023    Procedure: Bilateral C5-7 MBB (diagnostic);  Surgeon: Otto Phillips MD;  Location: [facility];  Service: Pain Management;  Laterality: Bilateral;    MOUTH SURGERY      OOPHORECTOMY      hyst/bso, hypermenorrhea     "ROBOT-ASSISTED CHOLECYSTECTOMY USING DA DARI XI N/A 1/3/2020    Procedure: XI ROBOTIC CHOLECYSTECTOMY;  Surgeon: Damir Driscoll MD;  Location: [facility];  Service: General;  Laterality: N/A;    TOTAL REDUCTION MAMMOPLASTY      TUBAL LIGATION          Social History     Tobacco Use    Smoking status: Never    Smokeless tobacco: Never   Substance Use Topics    Alcohol use: Yes     Alcohol/week: 0.0 standard drinks of alcohol     Comment: socially  No alcohol 72h prior to sx    Drug use: No       Family History   Problem Relation Name Age of Onset    Diabetes Mother      Hyperlipidemia Mother      Hypertension Mother      Asthma Mother      COPD Mother      Glaucoma Mother      Thyroid disease Mother      Anesthesia problems Mother          "almost had a cardiac arrest" , blood clots    Hypertension Father      Hyperlipidemia Father      Cancer Father          Brain, lung, liver, kidney    No Known Problems Sister      Hypertension Brother      Alcohol abuse Brother      Heart disease Maternal Grandmother      Hyperlipidemia Maternal Grandmother      Hypertension Maternal Grandmother      Cataracts Maternal Grandmother      Diabetes Maternal Grandmother      Heart disease Maternal Grandfather      Hyperlipidemia Maternal Grandfather      Hypertension Maternal Grandfather      Glaucoma Maternal Grandfather      Cancer Maternal Grandfather      Cataracts Maternal Grandfather      Macular degeneration Maternal Grandfather      Diabetes Maternal Grandfather      Heart disease Paternal Grandmother      Hyperlipidemia Paternal Grandmother      Hypertension Paternal Grandmother      Cataracts Paternal Grandmother      Heart disease Paternal Grandfather      Hyperlipidemia Paternal Grandfather      Hypertension Paternal Grandfather      Cataracts Paternal Grandfather      Breast cancer Maternal Cousin      Breast cancer Maternal Cousin      Breast cancer Maternal Cousin      Breast cancer Maternal Cousin         Review of " "patient's allergies indicates:   Allergen Reactions    Codeine Itching    Hydromorphone Other (See Comments)     Can't wake up for long time if taken    Slow to wake up after surgery after receiving    Aleve [naproxen sodium]      Increases BP    Ibuprofen      Increases BP    Neuromuscular blockers, steroidal Hives     some    Pregabalin Itching    Latex, natural rubber Rash    Morphine Rash     itching    Norco [hydrocodone-acetaminophen] Itching, Rash and Hallucinations    Secobarbital sodium Rash     itching    Tylox [oxycodone-acetaminophen] Rash       Medication List with Changes/Refills   Current Medications    ALBUTEROL (PROVENTIL) 2.5 MG /3 ML (0.083 %) NEBULIZER SOLUTION    Take 2.5 mg by nebulization every 4 (four) hours as needed. Times a day    ALBUTEROL (PROVENTIL/VENTOLIN HFA) 90 MCG/ACTUATION INHALER    Inhale 2 puffs into the lungs every 6 (six) hours as needed for Wheezing.    ALPRAZOLAM (XANAX) 1 MG TABLET    Take 1 tablet (1 mg total) by mouth 2 (two) times daily as needed for Anxiety.    AMLODIPINE-VALSARTAN (EXFORGE)  MG PER TABLET    Take 1 tablet by mouth once daily.    AMLODIPINE-VALSARTAN (EXFORGE)  MG PER TABLET    Take 1 tablet by mouth in the morning.    AMLODIPINE-VALSARTAN (EXFORGE)  MG PER TABLET    Take 1 tablet by mouth every morning    ATORVASTATIN (LIPITOR) 20 MG TABLET    Take 1 tablet (20 mg total) by mouth every evening    BD INSULIN SYRINGE ULTRA-FINE 1/2 ML 30 GAUGE X 1/2" SYRG        BLOOD-GLUCOSE SENSOR (DEXCOM G6 SENSOR) NHI    change sensor every 10 days.    BLOOD-GLUCOSE SENSOR (DEXCOM G7 SENSOR) NHI    Use to check bg. Change every 10 days    BLOOD-GLUCOSE TRANSMITTER (DEXCOM G6 TRANSMITTER) NHI    1 each by Misc.(Non-Drug; Combo Route) route every 3 (three) months.    BLOOD-GLUCOSE TRANSMITTER (DEXCOM G6 TRANSMITTER) NHI    Use as directed change every 3 months    CELECOXIB (CELEBREX) 100 MG CAPSULE    Take 1 capsule (100 mg total) by mouth 2 " "(two) times daily.    CYCLOBENZAPRINE (FLEXERIL) 10 MG TABLET    Take 1 tablet (10 mg total) by mouth 3 (three) times daily as needed (Pain).    DULOXETINE (CYMBALTA) 60 MG CAPSULE    Take 1 capsule by mouth 2 times daily    ERGOCALCIFEROL (VITAMIN D2) 50,000 UNIT CAP    Take 1 capsule twice weekly for 3 weeks then weekly    ESTRADIOL (ESTRACE) 0.01 % (0.1 MG/GRAM) VAGINAL CREAM    Place 1 g vaginally twice a week.    FENOFIBRATE (TRICOR) 145 MG TABLET    Take 1 tablet (145 mg total) by mouth once daily.    FLUTICASONE PROPIONATE (FLONASE) 50 MCG/ACTUATION NASAL SPRAY    2 sprays (100 mcg total) by Each Nare route once daily.    FROVATRIPTAN (FROVA) 2.5 MG TABLET    Take 1 tablet at onset of acute migraine. May take 1 more tablet 2 hours later if needed. (MAX 2 TABLET PER DAY. MAX 4 TABLETS PER WEEK.)    FROVATRIPTAN (FROVA) 2.5 MG TABLET    Take 1 tablet by mouth as needed for Migraine for up to 180 days. If recurs, may repeat after 2 hours. Max of 3 tabs in 24 hours.    FUROSEMIDE (LASIX) 20 MG TABLET    Take 1 tablet (20 mg total) by mouth once daily.    FUROSEMIDE (LASIX) 20 MG TABLET    Take 1 tablet by mouth every day    GLUCAGON (BAQSIMI) 3 MG/ACTUATION SPRY    1 spray by Nasal route as needed (hypoglycemia). For emergency use. May repeat 1 time after 15 minutes    GLUCAGON (BAQSIMI) 3 MG/ACTUATION SPRY    SPRAY 1 SPRAY IN NOSTRIL AS NEEDED FOR EMERGENCY. MAY REPEAT 1 TIME AFTER 15 MINUTES    INSULIN ASPART U-100 (NOVOLOG U-100 INSULIN ASPART) 100 UNIT/ML INJECTION    Inject 100 Units into the skin Every 3 (three) days. To use via insulin pump    INSULIN PUMP CART,AUTOMATED,BT (OMNIPOD 5 G6 PODS, GEN 5,) CRTG    Inject 1 each into the skin Every 3 (three) days.    INSULIN SYRINGE-NEEDLE U-100 0.3 ML 30 GAUGE X 5/16" SYRG    1 each by Misc.(Non-Drug; Combo Route) route Every 3 (three) days.    LAMOTRIGINE (LAMICTAL) 200 MG TABLET    Take 1 tablet (200 mg total) by mouth 2 (two) times daily.    LEVOCETIRIZINE " "(XYZAL) 5 MG TABLET    Take 1 tablet (5 mg total) by mouth every evening.    LIDOCAINE (LIDODERM) 5 %    Place 2 patches onto the skin every 12 (twelve) hours as needed (pain). Remove & Discard patch within 12 hours or as directed by MD TRINHITEGRAST (XIIDRA) 5 % DPET    Place 1 drop into both eyes 2 (two) times daily.    METOPROLOL SUCCINATE (TOPROL-XL) 25 MG 24 HR TABLET    Take 1 tablet (25 mg total) by mouth once daily.    METOPROLOL SUCCINATE (TOPROL-XL) 25 MG 24 HR TABLET    Take 1 tablet by mouth in the morning.    METOPROLOL SUCCINATE (TOPROL-XL) 25 MG 24 HR TABLET    Take 1 tablet by mouth every morning    MIRTAZAPINE (REMERON) 30 MG TABLET    Take 1 tablet (30 mg total) by mouth every evening.    OMEPRAZOLE (PRILOSEC) 40 MG CAPSULE    Take 1 capsule (40 mg total) by mouth once daily.    PEN NEEDLE, DIABETIC (BD ULTRA-FINE MINI PEN NEEDLE) 31 GAUGE X 3/16" NDLE    Use one needle 5 times a day    PEN NEEDLE, DIABETIC (BD ULTRA-FINE MINI PEN NEEDLE) 31 GAUGE X 3/16" NDLE    Use 5 a day    PEN NEEDLE, DIABETIC (BD ULTRA-FINE MINI PEN NEEDLE) 31 GAUGE X 3/16" NDLE    USE TO INJECT INSULIN 5 TIMES DAILY    POLYETHYLENE GLYCOL (GLYCOLAX) 17 GRAM/DOSE POWDER    MIX 17 GRAMS (1 CAPFUL) IN 8 OUNCES OF WATER AND DRINK TWICE DAILY    SECUKINUMAB (COSENTYX PEN, 2 PENS,) 150 MG/ML PNIJ    Inject 300 mg into the skin every 14 (fourteen) days.    SPIRONOLACTONE (ALDACTONE) 25 MG TABLET    Take 1 tablet (25 mg total) by mouth once daily.    SPIRONOLACTONE (ALDACTONE) 25 MG TABLET    Take 1 tablet by mouth in the morning.    SPIRONOLACTONE (ALDACTONE) 25 MG TABLET    Take 1 tablet by mouth every morning    SYMBICORT 160-4.5 MCG/ACTUATION HFAA    Inhale 2 puffs into the lungs in the morning and 2 puffs before bedtime. 2 puffs every 12 hours   Changed and/or Refilled Medications    Modified Medication Previous Medication    FLUCONAZOLE (DIFLUCAN) 100 MG TABLET fluconazole (DIFLUCAN) 100 MG tablet       Take 1 tablet by mouth " in the morning for 14 days    Take 1 tablet by mouth in the morning for 14 days         Disclaimer: This note was prepared using voice recognition system and is likely to have sound alike errors and is not proofread.  Please message me with any questions.    32 minutes of total time spent on the encounter, which includes face to face time and non-face to face time preparing to see the patient (eg, review of tests), Obtaining and/or reviewing separately obtained history, Documenting clinical information in the electronic or other health record, Independently interpreting results (not separately reported) and communicating results to the patient/family/caregiver, or Care coordination (not separately reported).     Thank you for allowing me to participate in the care of Yolanda Martin Alpesh.    Shaq Anderson MD

## 2024-07-16 DIAGNOSIS — K21.9 GASTROESOPHAGEAL REFLUX DISEASE, UNSPECIFIED WHETHER ESOPHAGITIS PRESENT: ICD-10-CM

## 2024-07-16 DIAGNOSIS — F25.0 SCHIZOAFFECTIVE DISORDER, BIPOLAR TYPE: Chronic | ICD-10-CM

## 2024-07-16 RX ORDER — LAMOTRIGINE 200 MG/1
200 TABLET ORAL 2 TIMES DAILY
Qty: 60 TABLET | Refills: 2 | OUTPATIENT
Start: 2024-07-16 | End: 2024-10-14

## 2024-07-16 RX ORDER — OMEPRAZOLE 40 MG/1
40 CAPSULE, DELAYED RELEASE ORAL DAILY
Qty: 90 CAPSULE | Refills: 1 | Status: SHIPPED | OUTPATIENT
Start: 2024-07-16

## 2024-07-16 RX ORDER — LANOLIN ALCOHOL/MO/W.PET/CERES
400 CREAM (GRAM) TOPICAL DAILY
Qty: 90 TABLET | Refills: 0 | Status: SHIPPED | OUTPATIENT
Start: 2024-07-16 | End: 2024-10-14

## 2024-07-16 NOTE — TELEPHONE ENCOUNTER
Refill Decision Note   Yolanda Alpesh  is requesting a refill authorization.  Brief Assessment and Rationale for Refill:  Approve     Medication Therapy Plan:         Comments:     Note composed:10:33 AM 07/16/2024

## 2024-07-16 NOTE — TELEPHONE ENCOUNTER
No care due was identified.  [facility] Embedded Care Due Messages. Reference number: [id number].   7/16/2024 9:15:52 AM CDT

## 2024-07-19 ENCOUNTER — PATIENT MESSAGE (OUTPATIENT)
Dept: GASTROENTEROLOGY | Facility: CLINIC | Age: 54
End: 2024-07-19
Payer: MEDICAID

## 2024-07-20 DIAGNOSIS — R10.84 GENERALIZED ABDOMINAL PAIN: ICD-10-CM

## 2024-07-20 DIAGNOSIS — R10.13 EPIGASTRIC PAIN: ICD-10-CM

## 2024-07-20 DIAGNOSIS — K75.81 NASH (NONALCOHOLIC STEATOHEPATITIS): Primary | ICD-10-CM

## 2024-07-20 DIAGNOSIS — K58.2 IRRITABLE BOWEL SYNDROME WITH BOTH CONSTIPATION AND DIARRHEA: ICD-10-CM

## 2024-07-20 DIAGNOSIS — K63.9 TYPE 2 DIABETES MELLITUS WITH DIABETIC ENTEROPATHY: ICD-10-CM

## 2024-07-20 DIAGNOSIS — E11.69 TYPE 2 DIABETES MELLITUS WITH DIABETIC ENTEROPATHY: ICD-10-CM

## 2024-07-21 ENCOUNTER — NURSE TRIAGE (OUTPATIENT)
Dept: ADMINISTRATIVE | Facility: CLINIC | Age: 54
End: 2024-07-21
Payer: MEDICAID

## 2024-07-21 DIAGNOSIS — I10 ESSENTIAL HYPERTENSION: Chronic | ICD-10-CM

## 2024-07-21 DIAGNOSIS — Z01.818 PREOP EXAMINATION: Primary | ICD-10-CM

## 2024-07-22 ENCOUNTER — PATIENT MESSAGE (OUTPATIENT)
Dept: DIABETES | Facility: CLINIC | Age: 54
End: 2024-07-22
Payer: MEDICAID

## 2024-07-22 ENCOUNTER — TELEPHONE (OUTPATIENT)
Dept: GASTROENTEROLOGY | Facility: CLINIC | Age: 54
End: 2024-07-22
Payer: MEDICAID

## 2024-07-22 ENCOUNTER — TELEPHONE (OUTPATIENT)
Dept: DIABETES | Facility: CLINIC | Age: 54
End: 2024-07-22
Payer: MEDICAID

## 2024-07-22 NOTE — TELEPHONE ENCOUNTER
Patient c/o a BS of 354 and 400. Denies any symptoms. Per protocol will contact the on call provider. Per Dr. Beach, will speak with the patient directly. Advised the patient to call back with any further questions or if symptoms worsen.        Reason for Disposition   [1] Blood glucose > 300 mg/dL (16.7 mmol/L) AND [2] two or more times in a row    Additional Information   Negative: Unconscious or difficult to awaken   Negative: Acting confused (e.g., disoriented, slurred speech)   Negative: Very weak (e.g., can't stand)   Negative: Sounds like a life-threatening emergency to the triager   Negative: [1] Vomiting AND [2] signs of dehydration (e.g., very dry mouth, lightheaded, dark urine)   Negative: [1] Blood glucose > 240 mg/dL (13.3 mmol/L) AND [2] rapid breathing   Negative: Blood glucose > 500 mg/dL (27.8 mmol/L)   Negative: [1] Blood glucose > 240 mg/dL (13.3 mmol/L) AND [2] urine ketones moderate-large (or more than 1+)   Negative: [1] Blood glucose > 240 mg/dL (13.3 mmol/L) AND [2] blood ketones > 1.4 mmol/L   Negative: [1] Blood glucose > 240 mg/dL (13.3 mmol/L) AND [2] vomiting AND [3] unable to check for ketones (in blood or urine)   Negative: [1] New-onset diabetes suspected (e.g., frequent urination, weak, weight loss) AND [2] vomiting or rapid breathing   Negative: Vomiting lasts > 4 hours   Negative: Patient sounds very sick or weak to the triager   Negative: Fever > 100.4 F (38.0 C)   Negative: Blood glucose > 400 mg/dL (22.2 mmol/L)    Protocols used: Diabetes - High Blood Sugar-A-

## 2024-07-22 NOTE — TELEPHONE ENCOUNTER
----- Message from Roselyn York sent at 7/22/2024  9:52 AM CDT -----  Contact: self  Pt is asking for an return call in reference to discussing her blood sugars, please call back at .[phone] Thx CJ

## 2024-07-22 NOTE — TELEPHONE ENCOUNTER
Patient call returned regarding elevated blood glucose levels, c/o weakness, fatigue, dry mouth, stomach pain, and body aches overall  does not feel well current reading now is 160 mg/dl patient asked what to do? Patient was advised to go to ER for assessment     Provider to be informed

## 2024-07-23 ENCOUNTER — TELEPHONE (OUTPATIENT)
Dept: HEPATOLOGY | Facility: CLINIC | Age: 54
End: 2024-07-23
Payer: MEDICAID

## 2024-07-23 NOTE — TELEPHONE ENCOUNTER
----- Message from Tiara Webb sent at 7/23/2024 11:16 AM CDT -----  Who Called: [employer]     What is the request in detail: Requesting call back to discuss questionnaire that was faxed on July 15. Please advise    Can the clinic reply by [facility]? No    Best Call Back Number: [phone]    Additional Information: ref # [id number]

## 2024-07-23 NOTE — TELEPHONE ENCOUNTER
Called number back and gave reference number. Do no see any form received. Verified fax number and encouraged them to fax again.  The representative stated he would.

## 2024-07-23 NOTE — TELEPHONE ENCOUNTER
Gm spoke with patient she is now sharing both omnipod and dexcom patient voiced that she voiced that she is feeling better today I have uploaded dexcom to you

## 2024-07-24 ENCOUNTER — PATIENT MESSAGE (OUTPATIENT)
Dept: HEPATOLOGY | Facility: CLINIC | Age: 54
End: 2024-07-24
Payer: MEDICAID

## 2024-07-26 ENCOUNTER — TELEPHONE (OUTPATIENT)
Dept: PSYCHIATRY | Facility: CLINIC | Age: 54
End: 2024-07-26
Payer: MEDICAID

## 2024-07-30 ENCOUNTER — OFFICE VISIT (OUTPATIENT)
Dept: PSYCHIATRY | Facility: CLINIC | Age: 54
End: 2024-07-30
Payer: MEDICAID

## 2024-07-30 ENCOUNTER — LAB VISIT (OUTPATIENT)
Dept: LAB | Facility: HOSPITAL | Age: 54
End: 2024-07-30
Attending: INTERNAL MEDICINE
Payer: MEDICAID

## 2024-07-30 ENCOUNTER — CLINICAL SUPPORT (OUTPATIENT)
Dept: REHABILITATION | Facility: HOSPITAL | Age: 54
End: 2024-07-30
Attending: STUDENT IN AN ORGANIZED HEALTH CARE EDUCATION/TRAINING PROGRAM
Payer: MEDICAID

## 2024-07-30 VITALS
SYSTOLIC BLOOD PRESSURE: 131 MMHG | BODY MASS INDEX: 43.45 KG/M2 | WEIGHT: 193.81 LBS | DIASTOLIC BLOOD PRESSURE: 82 MMHG | HEART RATE: 89 BPM

## 2024-07-30 DIAGNOSIS — F25.0 SCHIZOAFFECTIVE DISORDER, BIPOLAR TYPE: Primary | Chronic | ICD-10-CM

## 2024-07-30 DIAGNOSIS — G89.29 CHRONIC BILATERAL LOW BACK PAIN WITH BILATERAL SCIATICA: Primary | ICD-10-CM

## 2024-07-30 DIAGNOSIS — F51.05 INSOMNIA DUE TO OTHER MENTAL DISORDER: ICD-10-CM

## 2024-07-30 DIAGNOSIS — M79.7 FIBROMYALGIA: ICD-10-CM

## 2024-07-30 DIAGNOSIS — R29.898 WEAKNESS OF BOTH LOWER EXTREMITIES: ICD-10-CM

## 2024-07-30 DIAGNOSIS — J30.1 SEASONAL ALLERGIC RHINITIS DUE TO POLLEN: Chronic | ICD-10-CM

## 2024-07-30 DIAGNOSIS — K59.00 CONSTIPATION, UNSPECIFIED CONSTIPATION TYPE: ICD-10-CM

## 2024-07-30 DIAGNOSIS — M54.41 CHRONIC BILATERAL LOW BACK PAIN WITH BILATERAL SCIATICA: Primary | ICD-10-CM

## 2024-07-30 DIAGNOSIS — M45.9 ANKYLOSING SPONDYLITIS, UNSPECIFIED SITE OF SPINE: ICD-10-CM

## 2024-07-30 DIAGNOSIS — M54.42 CHRONIC BILATERAL LOW BACK PAIN WITH BILATERAL SCIATICA: Primary | ICD-10-CM

## 2024-07-30 DIAGNOSIS — R10.84 GENERALIZED ABDOMINAL PAIN: ICD-10-CM

## 2024-07-30 DIAGNOSIS — Z74.09 DECREASED MOBILITY AND ENDURANCE: ICD-10-CM

## 2024-07-30 DIAGNOSIS — K75.81 NASH (NONALCOHOLIC STEATOHEPATITIS): ICD-10-CM

## 2024-07-30 DIAGNOSIS — F41.1 GENERALIZED ANXIETY DISORDER: Chronic | ICD-10-CM

## 2024-07-30 DIAGNOSIS — F99 INSOMNIA DUE TO OTHER MENTAL DISORDER: ICD-10-CM

## 2024-07-30 LAB
ALBUMIN SERPL BCP-MCNC: 3.4 G/DL (ref 3.5–5.2)
ALP SERPL-CCNC: 80 U/L (ref 55–135)
ALT SERPL W/O P-5'-P-CCNC: 12 U/L (ref 10–44)
ANION GAP SERPL CALC-SCNC: 8 MMOL/L (ref 8–16)
AST SERPL-CCNC: 12 U/L (ref 10–40)
BASOPHILS # BLD AUTO: 0.04 K/UL (ref 0–0.2)
BASOPHILS NFR BLD: 0.6 % (ref 0–1.9)
BILIRUB DIRECT SERPL-MCNC: 0.1 MG/DL (ref 0.1–0.3)
BILIRUB SERPL-MCNC: 0.2 MG/DL (ref 0.1–1)
BUN SERPL-MCNC: 9 MG/DL (ref 6–20)
CALCIUM SERPL-MCNC: 9.3 MG/DL (ref 8.7–10.5)
CHLORIDE SERPL-SCNC: 106 MMOL/L (ref 95–110)
CO2 SERPL-SCNC: 25 MMOL/L (ref 23–29)
CREAT SERPL-MCNC: 0.7 MG/DL (ref 0.5–1.4)
DIFFERENTIAL METHOD BLD: ABNORMAL
EOSINOPHIL # BLD AUTO: 0 K/UL (ref 0–0.5)
EOSINOPHIL NFR BLD: 0 % (ref 0–8)
ERYTHROCYTE [DISTWIDTH] IN BLOOD BY AUTOMATED COUNT: 15.2 % (ref 11.5–14.5)
EST. GFR  (NO RACE VARIABLE): >60 ML/MIN/1.73 M^2
GLUCOSE SERPL-MCNC: 108 MG/DL (ref 70–110)
HCT VFR BLD AUTO: 35.3 % (ref 37–48.5)
HGB BLD-MCNC: 9.9 G/DL (ref 12–16)
IMM GRANULOCYTES # BLD AUTO: 0.02 K/UL (ref 0–0.04)
IMM GRANULOCYTES NFR BLD AUTO: 0.3 % (ref 0–0.5)
LIPASE SERPL-CCNC: 24 U/L (ref 4–60)
LYMPHOCYTES # BLD AUTO: 2.2 K/UL (ref 1–4.8)
LYMPHOCYTES NFR BLD: 34.5 % (ref 18–48)
MCH RBC QN AUTO: 24.7 PG (ref 27–31)
MCHC RBC AUTO-ENTMCNC: 28 G/DL (ref 32–36)
MCV RBC AUTO: 88 FL (ref 82–98)
MONOCYTES # BLD AUTO: 0.4 K/UL (ref 0.3–1)
MONOCYTES NFR BLD: 5.9 % (ref 4–15)
NEUTROPHILS # BLD AUTO: 3.7 K/UL (ref 1.8–7.7)
NEUTROPHILS NFR BLD: 58.7 % (ref 38–73)
NRBC BLD-RTO: 0 /100 WBC
PLATELET # BLD AUTO: 379 K/UL (ref 150–450)
PMV BLD AUTO: 10 FL (ref 9.2–12.9)
POTASSIUM SERPL-SCNC: 4.2 MMOL/L (ref 3.5–5.1)
PROT SERPL-MCNC: 6.7 G/DL (ref 6–8.4)
RBC # BLD AUTO: 4.01 M/UL (ref 4–5.4)
SODIUM SERPL-SCNC: 139 MMOL/L (ref 136–145)
WBC # BLD AUTO: 6.23 K/UL (ref 3.9–12.7)

## 2024-07-30 PROCEDURE — 4010F ACE/ARB THERAPY RXD/TAKEN: CPT | Mod: CPTII,,, | Performed by: PSYCHOLOGIST

## 2024-07-30 PROCEDURE — 83690 ASSAY OF LIPASE: CPT | Performed by: INTERNAL MEDICINE

## 2024-07-30 PROCEDURE — 90833 PSYTX W PT W E/M 30 MIN: CPT | Mod: HP,HB,S$PBB, | Performed by: PSYCHOLOGIST

## 2024-07-30 PROCEDURE — 3079F DIAST BP 80-89 MM HG: CPT | Mod: CPTII,,, | Performed by: PSYCHOLOGIST

## 2024-07-30 PROCEDURE — 36415 COLL VENOUS BLD VENIPUNCTURE: CPT | Performed by: INTERNAL MEDICINE

## 2024-07-30 PROCEDURE — 1159F MED LIST DOCD IN RCRD: CPT | Mod: CPTII,,, | Performed by: PSYCHOLOGIST

## 2024-07-30 PROCEDURE — 99212 OFFICE O/P EST SF 10 MIN: CPT | Mod: PBBFAC | Performed by: PSYCHOLOGIST

## 2024-07-30 PROCEDURE — 3075F SYST BP GE 130 - 139MM HG: CPT | Mod: CPTII,,, | Performed by: PSYCHOLOGIST

## 2024-07-30 PROCEDURE — 80048 BASIC METABOLIC PNL TOTAL CA: CPT | Performed by: INTERNAL MEDICINE

## 2024-07-30 PROCEDURE — 80076 HEPATIC FUNCTION PANEL: CPT | Performed by: INTERNAL MEDICINE

## 2024-07-30 PROCEDURE — 3044F HG A1C LEVEL LT 7.0%: CPT | Mod: CPTII,,, | Performed by: PSYCHOLOGIST

## 2024-07-30 PROCEDURE — 99999 PR PBB SHADOW E&M-EST. PATIENT-LVL II: CPT | Mod: PBBFAC,HB,, | Performed by: PSYCHOLOGIST

## 2024-07-30 PROCEDURE — 85025 COMPLETE CBC W/AUTO DIFF WBC: CPT | Performed by: INTERNAL MEDICINE

## 2024-07-30 PROCEDURE — 3008F BODY MASS INDEX DOCD: CPT | Mod: CPTII,,, | Performed by: PSYCHOLOGIST

## 2024-07-30 PROCEDURE — 97110 THERAPEUTIC EXERCISES: CPT

## 2024-07-30 PROCEDURE — 97162 PT EVAL MOD COMPLEX 30 MIN: CPT

## 2024-07-30 PROCEDURE — 99214 OFFICE O/P EST MOD 30 MIN: CPT | Mod: HP,HB,S$PBB, | Performed by: PSYCHOLOGIST

## 2024-07-30 RX ORDER — LAMOTRIGINE 200 MG/1
200 TABLET ORAL 2 TIMES DAILY
Qty: 60 TABLET | Refills: 2 | Status: SHIPPED | OUTPATIENT
Start: 2024-07-30 | End: 2024-10-28

## 2024-07-30 RX ORDER — ZOLPIDEM TARTRATE 5 MG/1
5 TABLET ORAL NIGHTLY PRN
Qty: 30 TABLET | Refills: 2 | Status: SHIPPED | OUTPATIENT
Start: 2024-07-30 | End: 2024-10-28

## 2024-07-30 RX ORDER — ALPRAZOLAM 1 MG/1
1 TABLET ORAL 2 TIMES DAILY PRN
Qty: 60 TABLET | Refills: 2 | Status: SHIPPED | OUTPATIENT
Start: 2024-07-30 | End: 2024-10-28

## 2024-07-30 RX ORDER — LUMATEPERONE 10.5 MG/1
10.5 CAPSULE ORAL DAILY
Qty: 30 CAPSULE | Refills: 2 | Status: SHIPPED | OUTPATIENT
Start: 2024-07-30 | End: 2024-10-28

## 2024-07-30 RX ORDER — INSULIN GLARGINE 300 U/ML
108 INJECTION, SOLUTION SUBCUTANEOUS NIGHTLY
Qty: 36 ML | Refills: 0 | OUTPATIENT
Start: 2024-07-30 | End: 2024-10-28

## 2024-07-30 NOTE — PATIENT INSTRUCTIONS
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
## 2024-07-30 NOTE — PROGRESS NOTES
"Outpatient Psychiatry Follow-Up Visit    7/30/2024      Chief Complaint:  Yolanda Betts is a 54 y.o. female who presents today for follow-up of mood and anxiety.       LAST VISIT: Yolanda attended her virtual visit. She has been in a lot of pain--hurting with her back pain, "and arms and legs feel weak." She has an upcoming appt.--sees Shaq Anderson MD in rheumatology. She denied having any adverse effects from the change to Caplyta since we decreased her dose--sedation is better. She reported "brain fog. It's hard to remember stuff." She can't tell if her mood is better, but this is complicated with her pain.    She has not attended IOP because of her pain.  She is still in therapy. Her therapist is "on a 2-week vacation." She last saw him last week.  We agreed to leave her medicines as currently prescribed and plan an in office visit in a month.  The goal is still to get her into IOP as she is physically capable.    Plan--continue Lamictal 200 mg bid; Xanax 1 mg bid; mirtazepine 30 mg hs; Caplyta 10.5 mg; takes Cymbalta 60 mg bid from another provider; [facility]    CURRENT PRESENTATION: Yolanda attended her visit. She has a little more energy--"still fighting through the depression and anxiety and panic attacks." She is working on "positive measures" and is praying. She has gained a lot of weight--wonders if Remeron. We discussed options--she does not want to try trazodone again because she had bad dreams and was aggressive in her sleep; doxepin was not effective (made her heart race--"I felt like I was going to die."). She wants to try Ambien again--we discussed her Xanax usage. She has been taking Xanax in the mornings and again between 3 and 6. Her bedtime is between 9 and 10 pm.     Her sister who passed 3 years ago had 5 kids; the two younger ones need caregivers. Her ex- who has custody is in [facility]. She has been helping her other sister care for the kids. She recently put her 72-y/o uncle in a nursing home. " "Yolanda's daughter drove her a month ago to get him--his roommate put him out, and the room was unclean. He has Crohn's and was having accidents. Yolanda had to call an ambulance to get him because she found blood in his stool. He was in the hospital for a month an then went to a nursing home. He needs 24 hour care. She is at peace with it now--but "it was very stressful." Her a/c has not been keeping up with the sun/heat. There are multiple stressors.    We agreed to try Ambien again. She does like Caplyta--she feels a little better.    Plan--continue Lamictal 200 mg bid; Xanax 1 mg bid; Caplyta 10.5 mg; takes Cymbalta 60 mg bid from another provider; decrease Remeron 15 mg hs for 4-6 nights, while starting Ambien 5 mg hs; then stop Remeron; consider IOP     since June 2024.          Therapist: Feroz Stanley LCSW with [facility]   Ph: [phone]  Fax: [phone]     Prior medicines: Prozac, Wellbutrin, Paxil, Lamictal, Lexapro, Celexa, Cymbalta, Remeron, Abilify, Seroquel (raised blood sugars), Risperdal, Latuda, Vraylar, Restoril, Ambien (groggy), Ambien CR, trazodone, doxepin, Xanax, clonazepam  ----------------------------------------------------------------------------------------------------------  PSYCHOTHERAPY      Site: [facility]  Time: 16 minutes  Participants: Met with patient    Therapeutic Intervention Type: behavior modifying psychotherapy, supportive psychotherapy  Why chosen therapy is appropriate versus another modality: patient responds to this modality, evidence based practice    Target symptoms: depression, anxiety , mood disorder, sleep  Primary focus: see above    Outcome monitoring methods: self-report, observation    Patient's response to intervention:  The patient's response to intervention is accepting.    Progress toward goals:  The patient's progress toward goals is  better .        6/12/2024     9:45 AM 6/3/2024     8:12 AM 5/8/2024     1:50 PM " "  GAD7   1. Feeling nervous, anxious, or on edge? 3 3 3   2. Not being able to stop or control worrying? 3 3 3   3. Worrying too much about different things? 3 3 3   4. Trouble relaxing? 3 3 3   5. Being so restless that it is hard to sit still? 3 3 3   6. Becoming easily annoyed or irritable? 3 3 3   7. Feeling afraid as if something awful might happen? 3 3 3   JOANN-7 Score 21 21 21      0-4 = Minimal anxiety  5-9 = Mild anxiety  10-14 = Moderate anxiety  15-21 = Severe anxiety       Review of Systems   PSYCHIATRIC: Pertinant items are noted in the narrative.    Past Medical, Family and Social History: The patient's past medical, family and social history have been reviewed and updated as appropriate within the electronic medical record - see encounter notes.      Current Outpatient Medications:     albuterol (PROVENTIL) 2.5 mg /3 mL (0.083 %) nebulizer solution, Take 2.5 mg by nebulization every 4 (four) hours as needed. Times a day, Disp: 75 mL, Rfl: 1    albuterol (PROVENTIL/VENTOLIN HFA) 90 mcg/actuation inhaler, Inhale 2 puffs into the lungs every 6 (six) hours as needed for Wheezing., Disp: 8.5 g, Rfl: 1    ALPRAZolam (XANAX) 1 MG tablet, Take 1 tablet (1 mg total) by mouth 2 (two) times daily as needed for Anxiety., Disp: 60 tablet, Rfl: 2    amlodipine-valsartan (EXFORGE)  mg per tablet, Take 1 tablet by mouth once daily., Disp: 90 tablet, Rfl: 3    amlodipine-valsartan (EXFORGE)  mg per tablet, Take 1 tablet by mouth in the morning., Disp: 90 tablet, Rfl: 1    amlodipine-valsartan (EXFORGE)  mg per tablet, Take 1 tablet by mouth every morning, Disp: 90 tablet, Rfl: 1    atorvastatin (LIPITOR) 20 MG tablet, Take 1 tablet (20 mg total) by mouth every evening, Disp: 90 tablet, Rfl: 3    BD INSULIN SYRINGE ULTRA-FINE 1/2 mL 30 gauge x 1/2" Syrg, , Disp: , Rfl: 0    blood-glucose sensor (DEXCOM G6 SENSOR) Mariela, change sensor every 10 days., Disp: 3 each, Rfl: 11    blood-glucose sensor (DEXCOM " G7 SENSOR) Mariela, Use to check bg. Change every 10 days, Disp: 3 each, Rfl: 11    blood-glucose transmitter (DEXCOM G6 TRANSMITTER) Mariela, 1 each by Misc.(Non-Drug; Combo Route) route every 3 (three) months., Disp: 1 each, Rfl: 3    blood-glucose transmitter (DEXCOM G6 TRANSMITTER) Mariela, Use as directed change every 3 months, Disp: 1 each, Rfl: 3    celecoxib (CELEBREX) 100 MG capsule, Take 1 capsule (100 mg total) by mouth 2 (two) times daily., Disp: 60 capsule, Rfl: 2    cyclobenzaprine (FLEXERIL) 10 MG tablet, Take 1 tablet (10 mg total) by mouth 3 (three) times daily as needed (Pain)., Disp: 90 tablet, Rfl: 11    DULoxetine (CYMBALTA) 60 MG capsule, Take 1 capsule by mouth 2 times daily, Disp: 180 capsule, Rfl: 3    ergocalciferol (VITAMIN D2) 50,000 unit Cap, Take 1 capsule twice weekly for 3 weeks then weekly, Disp: 12 capsule, Rfl: 3    estradioL (ESTRACE) 0.01 % (0.1 mg/gram) vaginal cream, Place 1 g vaginally twice a week., Disp: 42.5 g, Rfl: 3    fenofibrate (TRICOR) 145 MG tablet, Take 1 tablet (145 mg total) by mouth once daily., Disp: 90 tablet, Rfl: 3    frovatriptan (FROVA) 2.5 MG tablet, Take 1 tablet at onset of acute migraine. May take 1 more tablet 2 hours later if needed. (MAX 2 TABLET PER DAY. MAX 4 TABLETS PER WEEK.), Disp: 9 tablet, Rfl: 1    frovatriptan (FROVA) 2.5 MG tablet, Take 1 tablet by mouth as needed for Migraine for up to 180 days. If recurs, may repeat after 2 hours. Max of 3 tabs in 24 hours., Disp: 10 tablet, Rfl: 2    furosemide (LASIX) 20 MG tablet, Take 1 tablet (20 mg total) by mouth once daily., Disp: 90 tablet, Rfl: 3    furosemide (LASIX) 20 MG tablet, Take 1 tablet by mouth every day, Disp: 90 tablet, Rfl: 2    glucagon (BAQSIMI) 3 mg/actuation Spry, 1 spray by Nasal route as needed (hypoglycemia). For emergency use. May repeat 1 time after 15 minutes, Disp: 2 each, Rfl: 1    glucagon (BAQSIMI) 3 mg/actuation Spry, SPRAY 1 SPRAY IN NOSTRIL AS NEEDED FOR EMERGENCY. MAY  "REPEAT 1 TIME AFTER 15 MINUTES, Disp: 2 each, Rfl: 1    insulin aspart U-100 (NOVOLOG U-100 INSULIN ASPART) 100 unit/mL injection, Inject 100 Units into the skin Every 3 (three) days. To use via insulin pump, Disp: 10 mL, Rfl: 5    insulin pump cart,automated,BT (OMNIPOD 5 G6 PODS, GEN 5,) Crtg, Inject 1 each into the skin Every 3 (three) days., Disp: 30 each, Rfl: 3    insulin syringe-needle U-100 0.3 mL 30 gauge x 5/16" Syrg, 1 each by Misc.(Non-Drug; Combo Route) route Every 3 (three) days., Disp: 100 each, Rfl: 2    lamoTRIgine (LAMICTAL) 200 MG tablet, Take 1 tablet (200 mg total) by mouth 2 (two) times daily., Disp: 60 tablet, Rfl: 2    levocetirizine (XYZAL) 5 MG tablet, Take 1 tablet (5 mg total) by mouth every evening., Disp: 30 tablet, Rfl: 11    LIDOcaine (LIDODERM) 5 %, Place 2 patches onto the skin every 12 (twelve) hours as needed (pain). Remove & Discard patch within 12 hours or as directed by MD, Disp: 60 patch, Rfl: 11    lifitegrast (XIIDRA) 5 % Dpet, Place 1 drop into both eyes 2 (two) times daily., Disp: 60 each, Rfl: 12    lumateperone (CAPLYTA) 10.5 mg Cap, Take 1 capsule (10.5 mg total) by mouth once daily., Disp: 30 capsule, Rfl: 2    magnesium oxide (MAG-OX) 400 mg (241.3 mg magnesium) tablet, Take 1 tablet (400 mg total) by mouth once daily., Disp: 90 tablet, Rfl: 0    metoprolol succinate (TOPROL-XL) 25 MG 24 hr tablet, Take 1 tablet (25 mg total) by mouth once daily., Disp: 90 tablet, Rfl: 3    metoprolol succinate (TOPROL-XL) 25 MG 24 hr tablet, Take 1 tablet by mouth in the morning., Disp: 90 tablet, Rfl: 1    metoprolol succinate (TOPROL-XL) 25 MG 24 hr tablet, Take 1 tablet by mouth every morning, Disp: 90 tablet, Rfl: 1    omeprazole (PRILOSEC) 40 MG capsule, Take 1 capsule (40 mg total) by mouth once daily., Disp: 90 capsule, Rfl: 1    pen needle, diabetic (BD ULTRA-FINE MINI PEN NEEDLE) 31 gauge x 3/16" Ndle, Use one needle 5 times a day, Disp: 200 each, Rfl: 11    pen needle, " "diabetic (BD ULTRA-FINE MINI PEN NEEDLE) 31 gauge x 3/16" Ndle, Use 5 a day, Disp: 200 each, Rfl: 11    pen needle, diabetic (BD ULTRA-FINE MINI PEN NEEDLE) 31 gauge x 3/16" Ndle, USE TO INJECT INSULIN 5 TIMES DAILY, Disp: 200 each, Rfl: 10    polyethylene glycol (GLYCOLAX) 17 gram/dose powder, MIX 17 GRAMS (1 CAPFUL) IN 8 OUNCES OF WATER AND DRINK TWICE DAILY, Disp: 510 g, Rfl: 0    secukinumab (COSENTYX PEN, 2 PENS,) 150 mg/mL PnIj, Inject 300 mg into the skin every 14 (fourteen) days., Disp: 4 mL, Rfl: 3    spironolactone (ALDACTONE) 25 MG tablet, Take 1 tablet (25 mg total) by mouth once daily., Disp: 90 tablet, Rfl: 3    spironolactone (ALDACTONE) 25 MG tablet, Take 1 tablet by mouth in the morning., Disp: 90 tablet, Rfl: 1    spironolactone (ALDACTONE) 25 MG tablet, Take 1 tablet by mouth every morning, Disp: 90 tablet, Rfl: 1    SYMBICORT 160-4.5 mcg/actuation HFAA, Inhale 2 puffs into the lungs in the morning and 2 puffs before bedtime. 2 puffs every 12 hours, Disp: 10.2 g, Rfl: 2    zolpidem (AMBIEN) 5 MG Tab, Take 1 tablet (5 mg total) by mouth nightly as needed (anxiety)., Disp: 30 tablet, Rfl: 2  No current facility-administered medications for this visit.    Facility-Administered Medications Ordered in Other Visits:     ondansetron injection 4 mg, 4 mg, Intravenous, Once PRN, Otto Phillips MD    Compliance: yes    Side effects: see above    Risk Parameters:  Patient reports no suicidal ideation  Patient reports no homicidal ideation  Patient reports no self-injurious behavior  Patient reports no violent behavior    Exam (detailed: at least 9 elements; comprehensive: all 15 elements)   Constitutional    Wt Readings from Last 10 Encounters:   07/30/24 87.9 kg (193 lb 12.6 oz)   07/15/24 86.5 kg (190 lb 12.9 oz)   07/11/24 85.7 kg (188 lb 15 oz)   05/23/24 78.5 kg (173 lb 1 oz)   03/22/24 78.5 kg (173 lb)   03/18/24 79.1 kg (174 lb 6.1 oz)   02/21/24 81.5 kg (179 lb 10.8 oz)   02/14/24 82.2 kg (181 lb " "3.2 oz)   02/06/24 83.2 kg (183 lb 6.8 oz)   01/30/24 82 kg (180 lb 12.4 oz)     Vitals:  Most recent vital signs were reviewed.   Last 3 sets of Vitals        7/11/2024     3:09 PM 7/15/2024    10:28 AM 7/30/2024    11:38 AM   Vitals - 1 value per visit   SYSTOLIC 128 130 131   DIASTOLIC 80 84 82   Pulse 94 90 89   Resp 17     Weight (lb) 188.93 190.81 193.78   Weight (kg) 85.7 86.55 87.9   Height 4' 8" (1.422 m) 4' 8" (1.422 m)    BMI (Calculated) 42.4 42.8    Pain Score Eight Zero           General:  age appropriate, casually dressed, neatly groomed, hair braided     Musculoskeletal  Muscle Strength/Tone:  no tremor, no tic   Gait & Station:  non-ataxic     Psychiatric  Speech:  no latency; no press, soft   Behavior: wnl   Mood & Affect:  anxious, depressed, but better  blunted   Thought Process:  normal and logical   Associations:  intact   Thought Content:  Denied hallucinations; denied suicidal ideation   Insight:  has awareness of illness   Judgement: behavior is adequate to circumstances   Orientation:  grossly intact   Memory: intact for content of interview   Language: grossly intact   Attention Span & Concentration:  Grossly intact   Fund of Knowledge:  intact and appropriate to age and level of education     Assessment and Diagnosis   Status/Progress: Based on the examination today, the patient's problem(s) is/are improved, adequately but not ideally controlled, and treatment resistant.  New problems have been presented today.   Co-morbidities and psychosocial stressors  are complicating management of the primary condition.  The working differential for this patient includes schizoaffective vs bipolar vs schizophrenia.     General Impression:     Encounter Diagnoses   Name Primary?    Schizoaffective disorder, bipolar type Yes    Generalized anxiety disorder     Insomnia due to other mental disorder        Intervention/Counseling/Treatment Plan   Medication Management: Discussed risks, benefits, and " "alternatives to treatment plan documented above with patient. I answered all patient questions related to this plan, and patient expressed understanding and agreement.   continue Lamictal 200 mg bid; Xanax 1 mg bid; Caplyta 10.5 mg; takes Cymbalta 60 mg bid from another provider; decrease Remeron 15 mg hs for 4-6 nights, while starting Ambien 5 mg hs; then stop Remeron  Continue with therapy    Consider IOP       Medication List with Changes/Refills   New Medications    ZOLPIDEM (AMBIEN) 5 MG TAB    Take 1 tablet (5 mg total) by mouth nightly as needed (anxiety).   Current Medications    ALBUTEROL (PROVENTIL) 2.5 MG /3 ML (0.083 %) NEBULIZER SOLUTION    Take 2.5 mg by nebulization every 4 (four) hours as needed. Times a day    ALBUTEROL (PROVENTIL/VENTOLIN HFA) 90 MCG/ACTUATION INHALER    Inhale 2 puffs into the lungs every 6 (six) hours as needed for Wheezing.    AMLODIPINE-VALSARTAN (EXFORGE)  MG PER TABLET    Take 1 tablet by mouth once daily.    AMLODIPINE-VALSARTAN (EXFORGE)  MG PER TABLET    Take 1 tablet by mouth in the morning.    AMLODIPINE-VALSARTAN (EXFORGE)  MG PER TABLET    Take 1 tablet by mouth every morning    ATORVASTATIN (LIPITOR) 20 MG TABLET    Take 1 tablet (20 mg total) by mouth every evening    BD INSULIN SYRINGE ULTRA-FINE 1/2 ML 30 GAUGE X 1/2" SYRG        BLOOD-GLUCOSE SENSOR (DEXCOM G6 SENSOR) NHI    change sensor every 10 days.    BLOOD-GLUCOSE SENSOR (DEXCOM G7 SENSOR) NHI    Use to check bg. Change every 10 days    BLOOD-GLUCOSE TRANSMITTER (DEXCOM G6 TRANSMITTER) NHI    1 each by Misc.(Non-Drug; Combo Route) route every 3 (three) months.    BLOOD-GLUCOSE TRANSMITTER (DEXCOM G6 TRANSMITTER) NHI    Use as directed change every 3 months    CELECOXIB (CELEBREX) 100 MG CAPSULE    Take 1 capsule (100 mg total) by mouth 2 (two) times daily.    CYCLOBENZAPRINE (FLEXERIL) 10 MG TABLET    Take 1 tablet (10 mg total) by mouth 3 (three) times daily as needed (Pain).    " "DULOXETINE (CYMBALTA) 60 MG CAPSULE    Take 1 capsule by mouth 2 times daily    ERGOCALCIFEROL (VITAMIN D2) 50,000 UNIT CAP    Take 1 capsule twice weekly for 3 weeks then weekly    ESTRADIOL (ESTRACE) 0.01 % (0.1 MG/GRAM) VAGINAL CREAM    Place 1 g vaginally twice a week.    FENOFIBRATE (TRICOR) 145 MG TABLET    Take 1 tablet (145 mg total) by mouth once daily.    FROVATRIPTAN (FROVA) 2.5 MG TABLET    Take 1 tablet at onset of acute migraine. May take 1 more tablet 2 hours later if needed. (MAX 2 TABLET PER DAY. MAX 4 TABLETS PER WEEK.)    FROVATRIPTAN (FROVA) 2.5 MG TABLET    Take 1 tablet by mouth as needed for Migraine for up to 180 days. If recurs, may repeat after 2 hours. Max of 3 tabs in 24 hours.    FUROSEMIDE (LASIX) 20 MG TABLET    Take 1 tablet (20 mg total) by mouth once daily.    FUROSEMIDE (LASIX) 20 MG TABLET    Take 1 tablet by mouth every day    GLUCAGON (BAQSIMI) 3 MG/ACTUATION SPRY    1 spray by Nasal route as needed (hypoglycemia). For emergency use. May repeat 1 time after 15 minutes    GLUCAGON (BAQSIMI) 3 MG/ACTUATION SPRY    SPRAY 1 SPRAY IN NOSTRIL AS NEEDED FOR EMERGENCY. MAY REPEAT 1 TIME AFTER 15 MINUTES    INSULIN ASPART U-100 (NOVOLOG U-100 INSULIN ASPART) 100 UNIT/ML INJECTION    Inject 100 Units into the skin Every 3 (three) days. To use via insulin pump    INSULIN PUMP CART,AUTOMATED,BT (OMNIPOD 5 G6 PODS, GEN 5,) CRTG    Inject 1 each into the skin Every 3 (three) days.    INSULIN SYRINGE-NEEDLE U-100 0.3 ML 30 GAUGE X 5/16" SYRG    1 each by Misc.(Non-Drug; Combo Route) route Every 3 (three) days.    LEVOCETIRIZINE (XYZAL) 5 MG TABLET    Take 1 tablet (5 mg total) by mouth every evening.    LIDOCAINE (LIDODERM) 5 %    Place 2 patches onto the skin every 12 (twelve) hours as needed (pain). Remove & Discard patch within 12 hours or as directed by MD    LIFITEGRAST (XIIDRA) 5 % DPET    Place 1 drop into both eyes 2 (two) times daily.    MAGNESIUM OXIDE (MAG-OX) 400 MG (241.3 MG " "MAGNESIUM) TABLET    Take 1 tablet (400 mg total) by mouth once daily.    METOPROLOL SUCCINATE (TOPROL-XL) 25 MG 24 HR TABLET    Take 1 tablet (25 mg total) by mouth once daily.    METOPROLOL SUCCINATE (TOPROL-XL) 25 MG 24 HR TABLET    Take 1 tablet by mouth in the morning.    METOPROLOL SUCCINATE (TOPROL-XL) 25 MG 24 HR TABLET    Take 1 tablet by mouth every morning    OMEPRAZOLE (PRILOSEC) 40 MG CAPSULE    Take 1 capsule (40 mg total) by mouth once daily.    PEN NEEDLE, DIABETIC (BD ULTRA-FINE MINI PEN NEEDLE) 31 GAUGE X 3/16" NDLE    Use one needle 5 times a day    PEN NEEDLE, DIABETIC (BD ULTRA-FINE MINI PEN NEEDLE) 31 GAUGE X 3/16" NDLE    Use 5 a day    PEN NEEDLE, DIABETIC (BD ULTRA-FINE MINI PEN NEEDLE) 31 GAUGE X 3/16" NDLE    USE TO INJECT INSULIN 5 TIMES DAILY    POLYETHYLENE GLYCOL (GLYCOLAX) 17 GRAM/DOSE POWDER    MIX 17 GRAMS (1 CAPFUL) IN 8 OUNCES OF WATER AND DRINK TWICE DAILY    SECUKINUMAB (COSENTYX PEN, 2 PENS,) 150 MG/ML PNIJ    Inject 300 mg into the skin every 14 (fourteen) days.    SPIRONOLACTONE (ALDACTONE) 25 MG TABLET    Take 1 tablet (25 mg total) by mouth once daily.    SPIRONOLACTONE (ALDACTONE) 25 MG TABLET    Take 1 tablet by mouth in the morning.    SPIRONOLACTONE (ALDACTONE) 25 MG TABLET    Take 1 tablet by mouth every morning    SYMBICORT 160-4.5 MCG/ACTUATION HFAA    Inhale 2 puffs into the lungs in the morning and 2 puffs before bedtime. 2 puffs every 12 hours   Changed and/or Refilled Medications    Modified Medication Previous Medication    ALPRAZOLAM (XANAX) 1 MG TABLET ALPRAZolam (XANAX) 1 MG tablet       Take 1 tablet (1 mg total) by mouth 2 (two) times daily as needed for Anxiety.    Take 1 tablet (1 mg total) by mouth 2 (two) times daily as needed for Anxiety.    LAMOTRIGINE (LAMICTAL) 200 MG TABLET lamoTRIgine (LAMICTAL) 200 MG tablet       Take 1 tablet (200 mg total) by mouth 2 (two) times daily.    Take 1 tablet (200 mg total) by mouth 2 (two) times daily.    " LUMATEPERONE (CAPLYTA) 10.5 MG CAP lumateperone (CAPLYTA) 10.5 mg Cap       Take 1 capsule (10.5 mg total) by mouth once daily.    Take 1 capsule (10.5 mg total) by mouth once daily.   Discontinued Medications    FLUCONAZOLE (DIFLUCAN) 100 MG TABLET    Take 1 tablet by mouth in the morning for 14 days    MIRTAZAPINE (REMERON) 30 MG TABLET    Take 1 tablet (30 mg total) by mouth every evening.        Return to Clinic: 6 weeks      I spent an additional 25 minutes performing E/M services with >50% spent on counseling, guidance, coordinating care (not Psychotherapy related) in addition to the 16 minutes performing Psychotherapy.    Time spent with pt including note preparation: 41 minutes       Mariam Young, PhD, MP  Advanced Practice Medical Psychologist  [facility]  [address] [place] [address] [facility].  [place] [address]  [phone]   [phone] fax

## 2024-07-30 NOTE — TELEPHONE ENCOUNTER
No care due was identified.  [facility] Embedded Care Due Messages. Reference number: [id number].   7/30/2024 2:07:47 PM CDT
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
## 2024-07-30 NOTE — LETTER
July 30, 2024      Feroz Stanley LCSW   [facility]   Ph: [phone]  Fax: [phone]             [facility]  [address] [facility] [address]  Phone: [phone]  Fax: [phone]   Patient: Yolanda Betts   MR Number: [id number]   YOB: 1970   Date of Visit: 7/30/2024     Dear Mr. Stanley,     I saw Yolanda today for follow-up. Please see attached, and let me know if you have any questions or need more information. I appreciate following her along with you.    Sincerely,    Mariam Young, PhD, MPAP  Advanced Practice Medical Psychologist              [place]

## 2024-07-31 ENCOUNTER — OFFICE VISIT (OUTPATIENT)
Dept: DIABETES | Facility: CLINIC | Age: 54
End: 2024-07-31
Payer: MEDICAID

## 2024-07-31 DIAGNOSIS — Z79.4 TYPE 2 DIABETES MELLITUS WITH HYPERGLYCEMIA, WITH LONG-TERM CURRENT USE OF INSULIN: Primary | ICD-10-CM

## 2024-07-31 DIAGNOSIS — E11.65 TYPE 2 DIABETES MELLITUS WITH HYPERGLYCEMIA, WITH LONG-TERM CURRENT USE OF INSULIN: Primary | ICD-10-CM

## 2024-07-31 PROCEDURE — 3044F HG A1C LEVEL LT 7.0%: CPT | Mod: CPTII,95,, | Performed by: NURSE PRACTITIONER

## 2024-07-31 PROCEDURE — 4010F ACE/ARB THERAPY RXD/TAKEN: CPT | Mod: CPTII,95,, | Performed by: NURSE PRACTITIONER

## 2024-07-31 PROCEDURE — 99214 OFFICE O/P EST MOD 30 MIN: CPT | Mod: 95,,, | Performed by: NURSE PRACTITIONER

## 2024-07-31 RX ORDER — FLUTICASONE PROPIONATE 50 MCG
2 SPRAY, SUSPENSION (ML) NASAL DAILY
Qty: 16 G | Refills: 11 | OUTPATIENT
Start: 2024-07-31 | End: 2025-07-31

## 2024-07-31 RX ORDER — POLYETHYLENE GLYCOL 3350 17 G/17G
17 POWDER, FOR SOLUTION ORAL 2 TIMES DAILY
Qty: 180 EACH | Refills: 0 | OUTPATIENT
Start: 2024-07-31 | End: 2024-10-29

## 2024-07-31 NOTE — PROGRESS NOTES
Ms. Yolanda Betts,     Your recent labs remain stable and no significant changes are seen.    Thanks,     Clarke Hernandez

## 2024-08-01 ENCOUNTER — TELEPHONE (OUTPATIENT)
Dept: CARDIOLOGY | Facility: CLINIC | Age: 54
End: 2024-08-01
Payer: MEDICAID

## 2024-08-01 DIAGNOSIS — K58.2 IRRITABLE BOWEL SYNDROME WITH BOTH CONSTIPATION AND DIARRHEA: ICD-10-CM

## 2024-08-01 DIAGNOSIS — E11.69 TYPE 2 DIABETES MELLITUS WITH OTHER SPECIFIED COMPLICATION, UNSPECIFIED WHETHER LONG TERM INSULIN USE: ICD-10-CM

## 2024-08-01 RX ORDER — INSULIN ASPART 100 [IU]/ML
INJECTION, SOLUTION INTRAVENOUS; SUBCUTANEOUS
Qty: 20 ML | Refills: 5 | Status: SHIPPED | OUTPATIENT
Start: 2024-08-01

## 2024-08-01 RX ORDER — POLYETHYLENE GLYCOL 3350 17 G/17G
17 POWDER, FOR SOLUTION ORAL DAILY
Qty: 1530 G | Refills: 3 | Status: SHIPPED | OUTPATIENT
Start: 2024-08-01

## 2024-08-01 NOTE — TELEPHONE ENCOUNTER
Returned call to pt. She states that she was prescribed miralax and is taking it BID. Reports it is not lasting for a whole month and she wants to use the packets not the bottle.  Can you please send in a prescription for miralax packets and enough for a month. Thank you

## 2024-08-01 NOTE — TELEPHONE ENCOUNTER
Call and spoke with patient;patient accepted appt with  on 8/8 at 2:20  at [facility].        ----- Message from Rolando Kent sent at 8/1/2024 12:02 PM CDT -----  Contact: Yolanda Guerrero is needing a call back to get an appointment scheduled for a check up because she has gained 20 pounds in one month and feels swollen. Please give her a call at [phone]

## 2024-08-01 NOTE — TELEPHONE ENCOUNTER
----- Message from Rolando Kent sent at 8/1/2024 12:04 PM CDT -----  Contact: Yolanda  Type:  RX Refill Request    Who Called: Yolanda  Refill or New Rx:refill  RX Name and Strength:insulin aspart U-100 (NOVOLOG U-100 INSULIN ASPART) 100 unit/mL injection   How is the patient currently taking it? (ex. 1XDay):  Is this a 30 day or 90 day RX:  Preferred Pharmacy with phone number:  [facility]  [address]  [place] [address]  Phone: [phone] Fax: [phone]      Local or Mail Order:local  Ordering Provider:alessandra  Would the patient rather a call back or a response via [facility]? call  Best Call Back Number:[phone]   Additional Information: Pt is asking that 2 be sent in because she is running out too soon

## 2024-08-01 NOTE — TELEPHONE ENCOUNTER
----- Message from Rolando Kent sent at 8/1/2024 11:59 AM CDT -----  Contact: Yolanda  Type:  Needs Medical Advice    Who Called: Yolanda  Symptoms (please be specific): Diarrhea    How long has patient had these symptoms:  yesterday  Pharmacy name and phone #:    [facility]  [address] [facility] [address]  Phone: [phone] Fax: [phone]      Would the patient rather a call back or a response via [facility]? call  Best Call Back Number: [phone]   Additional Information:

## 2024-08-06 ENCOUNTER — TELEPHONE (OUTPATIENT)
Dept: PULMONOLOGY | Facility: CLINIC | Age: 54
End: 2024-08-06
Payer: MEDICAID

## 2024-08-06 ENCOUNTER — CLINICAL SUPPORT (OUTPATIENT)
Dept: REHABILITATION | Facility: HOSPITAL | Age: 54
End: 2024-08-06
Payer: MEDICAID

## 2024-08-06 DIAGNOSIS — G89.29 CHRONIC BILATERAL LOW BACK PAIN WITH BILATERAL SCIATICA: Primary | ICD-10-CM

## 2024-08-06 DIAGNOSIS — M54.42 CHRONIC BILATERAL LOW BACK PAIN WITH BILATERAL SCIATICA: Primary | ICD-10-CM

## 2024-08-06 DIAGNOSIS — M53.3 SCLEROSIS OF SACROILIAC JOINT: ICD-10-CM

## 2024-08-06 DIAGNOSIS — Z74.09 DECREASED MOBILITY AND ENDURANCE: ICD-10-CM

## 2024-08-06 DIAGNOSIS — M45.9 ANKYLOSING SPONDYLITIS, UNSPECIFIED SITE OF SPINE: ICD-10-CM

## 2024-08-06 DIAGNOSIS — M54.41 CHRONIC BILATERAL LOW BACK PAIN WITH BILATERAL SCIATICA: Primary | ICD-10-CM

## 2024-08-06 DIAGNOSIS — J45.909 ASTHMA, UNSPECIFIED ASTHMA SEVERITY, UNSPECIFIED WHETHER COMPLICATED, UNSPECIFIED WHETHER PERSISTENT: Primary | ICD-10-CM

## 2024-08-06 DIAGNOSIS — R29.898 WEAKNESS OF BOTH LOWER EXTREMITIES: ICD-10-CM

## 2024-08-06 PROCEDURE — 97110 THERAPEUTIC EXERCISES: CPT

## 2024-08-06 RX ORDER — SECUKINUMAB 150 MG/ML
300 INJECTION SUBCUTANEOUS
Qty: 4 ML | Refills: 3 | Status: ACTIVE | OUTPATIENT
Start: 2024-08-06

## 2024-08-07 ENCOUNTER — OFFICE VISIT (OUTPATIENT)
Dept: PULMONOLOGY | Facility: CLINIC | Age: 54
End: 2024-08-07
Payer: MEDICAID

## 2024-08-07 ENCOUNTER — CLINICAL SUPPORT (OUTPATIENT)
Dept: PULMONOLOGY | Facility: CLINIC | Age: 54
End: 2024-08-07
Payer: MEDICAID

## 2024-08-07 ENCOUNTER — TELEPHONE (OUTPATIENT)
Dept: DIABETES | Facility: CLINIC | Age: 54
End: 2024-08-07
Payer: MEDICAID

## 2024-08-07 ENCOUNTER — PATIENT MESSAGE (OUTPATIENT)
Dept: DIABETES | Facility: CLINIC | Age: 54
End: 2024-08-07
Payer: MEDICAID

## 2024-08-07 ENCOUNTER — HOSPITAL ENCOUNTER (OUTPATIENT)
Dept: RADIOLOGY | Facility: HOSPITAL | Age: 54
Discharge: HOME | End: 2024-08-07
Attending: INTERNAL MEDICINE
Payer: MEDICAID

## 2024-08-07 VITALS
HEIGHT: 56 IN | WEIGHT: 195.88 LBS | HEART RATE: 85 BPM | BODY MASS INDEX: 44.06 KG/M2 | OXYGEN SATURATION: 99 % | RESPIRATION RATE: 16 BRPM

## 2024-08-07 DIAGNOSIS — J45.40 MODERATE PERSISTENT ASTHMA WITHOUT COMPLICATION: Primary | ICD-10-CM

## 2024-08-07 DIAGNOSIS — E78.5 DYSLIPIDEMIA ASSOCIATED WITH TYPE 2 DIABETES MELLITUS: Chronic | ICD-10-CM

## 2024-08-07 DIAGNOSIS — E11.69 DYSLIPIDEMIA ASSOCIATED WITH TYPE 2 DIABETES MELLITUS: Chronic | ICD-10-CM

## 2024-08-07 DIAGNOSIS — J45.40 MODERATE PERSISTENT ASTHMA WITHOUT COMPLICATION: ICD-10-CM

## 2024-08-07 DIAGNOSIS — G47.33 OSA (OBSTRUCTIVE SLEEP APNEA): ICD-10-CM

## 2024-08-07 DIAGNOSIS — E78.2 MIXED HYPERLIPIDEMIA: ICD-10-CM

## 2024-08-07 DIAGNOSIS — E66.9 NOCTURNAL HYPOXEMIA DUE TO OBESITY: Chronic | ICD-10-CM

## 2024-08-07 DIAGNOSIS — G47.36 NOCTURNAL HYPOXEMIA DUE TO OBESITY: Chronic | ICD-10-CM

## 2024-08-07 DIAGNOSIS — J45.909 ASTHMA, UNSPECIFIED ASTHMA SEVERITY, UNSPECIFIED WHETHER COMPLICATED, UNSPECIFIED WHETHER PERSISTENT: ICD-10-CM

## 2024-08-07 LAB
BRPFT: NORMAL
FEF 25 75 CHG: -4.2 %
FEF 25 75 LLN: 1.53
FEF 25 75 POST REF: 57.1 %
FEF 25 75 PRE REF: 59.6 %
FEF 25 75 REF: 2.93
FET100 CHG: -8.4 %
FEV1 CHG: 3.2 %
FEV1 FVC CHG: 2.2 %
FEV1 FVC LLN: 71
FEV1 FVC POST REF: 103.2 %
FEV1 FVC PRE REF: 101 %
FEV1 FVC REF: 82
FEV1 LLN: 1.32
FEV1 POST REF: 86.6 %
FEV1 PRE REF: 83.9 %
FEV1 REF: 1.75
FVC CHG: 1 %
FVC LLN: 1.62
FVC POST REF: 83.9 %
FVC PRE REF: 83.1 %
FVC REF: 2.14
PEF CHG: -7.4 %
PEF LLN: 3.32
PEF POST REF: 77.1 %
PEF PRE REF: 83.2 %
PEF REF: 4.85
POST FEF 25 75: 1.67 L/S (ref 1.53–4.33)
POST FET 100: 4.26 SEC
POST FEV1 FVC: 84.52 % (ref 70.75–93.02)
POST FEV1: 1.52 L (ref 1.32–2.19)
POST FVC: 1.8 L (ref 1.62–2.65)
POST PEF: 3.74 L/S (ref 3.32–6.38)
PRE FEF 25 75: 1.74 L/S (ref 1.53–4.33)
PRE FET 100: 4.66 SEC
PRE FEV1 FVC: 82.73 % (ref 70.75–93.02)
PRE FEV1: 1.47 L (ref 1.32–2.19)
PRE FVC: 1.78 L (ref 1.62–2.65)
PRE PEF: 4.04 L/S (ref 3.32–6.38)

## 2024-08-07 PROCEDURE — 95012 NITRIC OXIDE EXP GAS DETER: CPT | Mod: PBBFAC

## 2024-08-07 PROCEDURE — 71046 X-RAY EXAM CHEST 2 VIEWS: CPT | Mod: TC

## 2024-08-07 PROCEDURE — 99215 OFFICE O/P EST HI 40 MIN: CPT | Mod: PBBFAC,25 | Performed by: INTERNAL MEDICINE

## 2024-08-07 PROCEDURE — 71046 X-RAY EXAM CHEST 2 VIEWS: CPT | Mod: 26,,, | Performed by: STUDENT IN AN ORGANIZED HEALTH CARE EDUCATION/TRAINING PROGRAM

## 2024-08-07 PROCEDURE — 99999PBSHW PR PBB SHADOW TECHNICAL ONLY FILED TO HB: Mod: PBBFAC,,,

## 2024-08-07 PROCEDURE — 99999 PR PBB SHADOW E&M-EST. PATIENT-LVL V: CPT | Mod: PBBFAC,,, | Performed by: INTERNAL MEDICINE

## 2024-08-07 PROCEDURE — 94060 EVALUATION OF WHEEZING: CPT | Mod: PBBFAC

## 2024-08-07 RX ORDER — FROVATRIPTAN SUCCINATE 2.5 MG/1
2.5 TABLET, FILM COATED ORAL DAILY PRN
COMMUNITY
Start: 2024-03-26 | End: 2024-09-22

## 2024-08-07 RX ORDER — METOPROLOL SUCCINATE 25 MG/1
1 TABLET, EXTENDED RELEASE ORAL EVERY MORNING
COMMUNITY
Start: 2024-03-26 | End: 2024-08-08 | Stop reason: SDUPTHER

## 2024-08-07 RX ORDER — PROMETHAZINE HYDROCHLORIDE 25 MG/1
25 TABLET ORAL EVERY 6 HOURS PRN
COMMUNITY
Start: 2024-07-22

## 2024-08-07 RX ORDER — MIRTAZAPINE 30 MG/1
1 TABLET, FILM COATED ORAL NIGHTLY
COMMUNITY
Start: 2024-03-22

## 2024-08-07 RX ORDER — BUDESONIDE AND FORMOTEROL FUMARATE DIHYDRATE 160; 4.5 UG/1; UG/1
AEROSOL RESPIRATORY (INHALATION)
Qty: 10.2 G | Refills: 2 | Status: SHIPPED | OUTPATIENT
Start: 2024-08-07

## 2024-08-07 RX ORDER — SEMAGLUTIDE 2.68 MG/ML
2 INJECTION, SOLUTION SUBCUTANEOUS
COMMUNITY
Start: 2024-07-23

## 2024-08-07 RX ORDER — ALBUTEROL SULFATE 0.83 MG/ML
SOLUTION RESPIRATORY (INHALATION)
Qty: 75 ML | Refills: 1 | Status: SHIPPED | OUTPATIENT
Start: 2024-08-07

## 2024-08-07 RX ORDER — INSULIN ASPART 100 [IU]/ML
INJECTION, SOLUTION INTRAVENOUS; SUBCUTANEOUS
COMMUNITY

## 2024-08-07 RX ORDER — METHYLPREDNISOLONE 4 MG/1
TABLET ORAL
COMMUNITY

## 2024-08-07 RX ORDER — ALBUTEROL SULFATE 90 UG/1
2 INHALANT RESPIRATORY (INHALATION) EVERY 6 HOURS PRN
Qty: 8.5 G | Refills: 1 | Status: SHIPPED | OUTPATIENT
Start: 2024-08-07

## 2024-08-08 ENCOUNTER — OFFICE VISIT (OUTPATIENT)
Dept: CARDIOLOGY | Facility: CLINIC | Age: 54
End: 2024-08-08
Payer: MEDICAID

## 2024-08-08 VITALS
BODY MASS INDEX: 43.92 KG/M2 | HEIGHT: 56 IN | HEART RATE: 90 BPM | OXYGEN SATURATION: 99 % | DIASTOLIC BLOOD PRESSURE: 96 MMHG | SYSTOLIC BLOOD PRESSURE: 154 MMHG

## 2024-08-08 DIAGNOSIS — R07.89 OTHER CHEST PAIN: ICD-10-CM

## 2024-08-08 DIAGNOSIS — E11.65 TYPE 2 DIABETES MELLITUS WITH HYPERGLYCEMIA, WITH LONG-TERM CURRENT USE OF INSULIN: ICD-10-CM

## 2024-08-08 DIAGNOSIS — E11.42 DIABETIC POLYNEUROPATHY ASSOCIATED WITH TYPE 2 DIABETES MELLITUS: ICD-10-CM

## 2024-08-08 DIAGNOSIS — I10 ESSENTIAL HYPERTENSION: Primary | ICD-10-CM

## 2024-08-08 DIAGNOSIS — R55 NEAR SYNCOPE: ICD-10-CM

## 2024-08-08 DIAGNOSIS — Z79.4 TYPE 2 DIABETES MELLITUS WITH HYPERGLYCEMIA, WITH LONG-TERM CURRENT USE OF INSULIN: ICD-10-CM

## 2024-08-08 DIAGNOSIS — R06.09 DOE (DYSPNEA ON EXERTION): ICD-10-CM

## 2024-08-08 DIAGNOSIS — F41.1 GENERALIZED ANXIETY DISORDER: ICD-10-CM

## 2024-08-08 DIAGNOSIS — E78.5 DYSLIPIDEMIA ASSOCIATED WITH TYPE 2 DIABETES MELLITUS: ICD-10-CM

## 2024-08-08 DIAGNOSIS — F31.9 BIPOLAR 1 DISORDER: ICD-10-CM

## 2024-08-08 DIAGNOSIS — E11.59 HYPERTENSION COMPLICATING DIABETES: ICD-10-CM

## 2024-08-08 DIAGNOSIS — F25.0 SCHIZOAFFECTIVE DISORDER, BIPOLAR TYPE: ICD-10-CM

## 2024-08-08 DIAGNOSIS — I15.2 HYPERTENSION COMPLICATING DIABETES: ICD-10-CM

## 2024-08-08 DIAGNOSIS — E11.69 DYSLIPIDEMIA ASSOCIATED WITH TYPE 2 DIABETES MELLITUS: ICD-10-CM

## 2024-08-08 PROCEDURE — 93010 ELECTROCARDIOGRAM REPORT: CPT | Mod: ,,, | Performed by: STUDENT IN AN ORGANIZED HEALTH CARE EDUCATION/TRAINING PROGRAM

## 2024-08-08 PROCEDURE — 3080F DIAST BP >= 90 MM HG: CPT | Mod: CPTII,,, | Performed by: INTERNAL MEDICINE

## 2024-08-08 PROCEDURE — 99999 PR PBB SHADOW E&M-EST. PATIENT-LVL II: CPT | Mod: PBBFAC,,, | Performed by: INTERNAL MEDICINE

## 2024-08-08 PROCEDURE — 99212 OFFICE O/P EST SF 10 MIN: CPT | Mod: PBBFAC,25 | Performed by: INTERNAL MEDICINE

## 2024-08-08 PROCEDURE — 3008F BODY MASS INDEX DOCD: CPT | Mod: CPTII,,, | Performed by: INTERNAL MEDICINE

## 2024-08-08 PROCEDURE — 3077F SYST BP >= 140 MM HG: CPT | Mod: CPTII,,, | Performed by: INTERNAL MEDICINE

## 2024-08-08 PROCEDURE — 99214 OFFICE O/P EST MOD 30 MIN: CPT | Mod: S$PBB,,, | Performed by: INTERNAL MEDICINE

## 2024-08-08 PROCEDURE — 93005 ELECTROCARDIOGRAM TRACING: CPT

## 2024-08-08 PROCEDURE — 4010F ACE/ARB THERAPY RXD/TAKEN: CPT | Mod: CPTII,,, | Performed by: INTERNAL MEDICINE

## 2024-08-08 PROCEDURE — 3044F HG A1C LEVEL LT 7.0%: CPT | Mod: CPTII,,, | Performed by: INTERNAL MEDICINE

## 2024-08-08 RX ORDER — METOPROLOL SUCCINATE 50 MG/1
50 TABLET, EXTENDED RELEASE ORAL EVERY MORNING
Qty: 30 TABLET | Refills: 11 | Status: SHIPPED | OUTPATIENT
Start: 2024-08-08

## 2024-08-09 ENCOUNTER — TELEPHONE (OUTPATIENT)
Dept: PULMONOLOGY | Facility: CLINIC | Age: 54
End: 2024-08-09
Payer: MEDICAID

## 2024-08-10 ENCOUNTER — NURSE TRIAGE (OUTPATIENT)
Dept: ADMINISTRATIVE | Facility: CLINIC | Age: 54
End: 2024-08-10
Payer: MEDICAID

## 2024-08-10 DIAGNOSIS — R09.1 PLEURISY: Primary | ICD-10-CM

## 2024-08-10 LAB
OHS QRS DURATION: 84 MS
OHS QTC CALCULATION: 398 MS

## 2024-08-10 NOTE — TELEPHONE ENCOUNTER
Pt calling and reports pleurisy. Pt reports she needs something for the pain. No relief with tylenol. Says she cannot take NSAIDS. Pt was seen in Dr. Wills office on 8/8 and was in distress with chest pain. Pt reports she went to the hospital and was evaluated. Nothing noted in the chart to reference hospital visit. Pt continues to have the same constant pain.     Dispo is to call  now. Pt declined disposition and wants to speak with on-call MD. Warm transfer to Dr. Elena, on-call cardio, pt says she was diagnosed by Dr. Rainey so Dr. Elena said to connect with on-call for that specialty. Secure chat with Dr. Rainey who recommends patient proceed to the ED. Notified patient who v/u.     Reason for Disposition   [1] Chest pain lasts > 5 minutes AND [2] age > 44    Additional Information   Negative: SEVERE difficulty breathing (e.g., struggling for each breath, speaks in single words)   Negative: Difficult to awaken or acting confused (e.g., disoriented, slurred speech)   Negative: Shock suspected (e.g., cold/pale/clammy skin, too weak to stand, low BP, rapid pulse)   Negative: Passed out (e.g., fainted, lost consciousness, blacked out and was not responding)    Protocols used: Chest Pain-A-AH

## 2024-08-12 ENCOUNTER — DOCUMENTATION ONLY (OUTPATIENT)
Dept: SLEEP MEDICINE | Facility: CLINIC | Age: 54
End: 2024-08-12
Payer: MEDICAID

## 2024-08-12 ENCOUNTER — TELEPHONE (OUTPATIENT)
Dept: CARDIOLOGY | Facility: CLINIC | Age: 54
End: 2024-08-12
Payer: MEDICAID

## 2024-08-12 RX ORDER — METHYLPREDNISOLONE 4 MG/1
TABLET ORAL
Qty: 21 TABLET | Refills: 1 | Status: SHIPPED | OUTPATIENT
Start: 2024-08-12

## 2024-08-12 NOTE — TELEPHONE ENCOUNTER
Contacted patient; Advised patient of Dr Wills's directions; Patient has no further questions or concerns.     Previous Message:   To proceed with her scheduled echocardiogram and nuclear stress test as I discussed with her last visit.  Also she has undergoing some workup by Pulmonary to follow along with that as well.   Thank you   DR. Wills

## 2024-08-12 NOTE — TELEPHONE ENCOUNTER
Orders Placed This Encounter   Procedures    CT Chest Without Contrast     Standing Status:   Future     Standing Expiration Date:   8/12/2025        Requested Prescriptions     Signed Prescriptions Disp Refills    methylPREDNISolone (MEDROL DOSEPACK) 4 mg tablet 21 each 1     Sig: use as directed     Authorizing Provider: AROLDO ESQUIVEL

## 2024-08-14 ENCOUNTER — PATIENT MESSAGE (OUTPATIENT)
Dept: PULMONOLOGY | Facility: CLINIC | Age: 54
End: 2024-08-14
Payer: MEDICAID

## 2024-08-14 ENCOUNTER — HOSPITAL ENCOUNTER (OUTPATIENT)
Dept: RADIOLOGY | Facility: HOSPITAL | Age: 54
Discharge: HOME | End: 2024-08-14
Attending: INTERNAL MEDICINE
Payer: MEDICAID

## 2024-08-14 ENCOUNTER — TELEPHONE (OUTPATIENT)
Dept: SLEEP MEDICINE | Facility: CLINIC | Age: 54
End: 2024-08-14
Payer: MEDICAID

## 2024-08-14 DIAGNOSIS — R09.1 PLEURISY: ICD-10-CM

## 2024-08-14 DIAGNOSIS — K75.81 NASH (NONALCOHOLIC STEATOHEPATITIS): ICD-10-CM

## 2024-08-14 DIAGNOSIS — E11.65 TYPE 2 DIABETES MELLITUS WITH HYPERGLYCEMIA, WITH LONG-TERM CURRENT USE OF INSULIN: ICD-10-CM

## 2024-08-14 DIAGNOSIS — Z79.4 TYPE 2 DIABETES MELLITUS WITH HYPERGLYCEMIA, WITH LONG-TERM CURRENT USE OF INSULIN: ICD-10-CM

## 2024-08-14 DIAGNOSIS — R10.13 EPIGASTRIC PAIN: ICD-10-CM

## 2024-08-14 DIAGNOSIS — J06.9 UPPER RESPIRATORY TRACT INFECTION, UNSPECIFIED TYPE: Primary | ICD-10-CM

## 2024-08-14 DIAGNOSIS — R10.84 GENERALIZED ABDOMINAL PAIN: ICD-10-CM

## 2024-08-14 PROCEDURE — 74177 CT ABD & PELVIS W/CONTRAST: CPT | Mod: 26,,, | Performed by: RADIOLOGY

## 2024-08-14 PROCEDURE — 71250 CT THORAX DX C-: CPT | Mod: TC

## 2024-08-14 PROCEDURE — 25500020 PHARM REV CODE 255: Performed by: INTERNAL MEDICINE

## 2024-08-14 PROCEDURE — 74177 CT ABD & PELVIS W/CONTRAST: CPT | Mod: TC

## 2024-08-14 PROCEDURE — A9698 NON-RAD CONTRAST MATERIALNOC: HCPCS | Performed by: INTERNAL MEDICINE

## 2024-08-14 PROCEDURE — 71250 CT THORAX DX C-: CPT | Mod: 26,,, | Performed by: RADIOLOGY

## 2024-08-14 RX ORDER — SEMAGLUTIDE 2.68 MG/ML
2 INJECTION, SOLUTION SUBCUTANEOUS
Qty: 3 ML | Refills: 5 | OUTPATIENT
Start: 2024-08-14 | End: 2025-08-14

## 2024-08-14 RX ORDER — AMOXICILLIN 500 MG/1
500 CAPSULE ORAL EVERY 8 HOURS
Qty: 30 CAPSULE | Refills: 0 | Status: SHIPPED | OUTPATIENT
Start: 2024-08-14 | End: 2024-08-24

## 2024-08-14 RX ADMIN — IOHEXOL 1000 ML: 12 SOLUTION ORAL at 11:08

## 2024-08-14 RX ADMIN — IOHEXOL 100 ML: 350 INJECTION, SOLUTION INTRAVENOUS at 12:08

## 2024-08-14 NOTE — PROGRESS NOTES
Ms. Betts, I am Dr. Oshea and am one of the GI physicians covering for Dr. Hernandez while he is out. The CT scan of your abdomen and pelvis performed today because of abdominal pain shows the enlarged fatty liver and after reviewing your images, there is a large amount of stool in your colon. With IBS your stools can alternate from diarrhea to constipation. Please take the Miralax prescribed to you back in March 2024 when you saw Dr. Hernandez in clinic. Please follow up with Dr. Britton, your primary care provider. If your symptoms persists a follow up in the GI clinic can also be arranged.

## 2024-08-14 NOTE — TELEPHONE ENCOUNTER
Requested Prescriptions     Signed Prescriptions Disp Refills    amoxicillin (AMOXIL) 500 MG capsule 30 capsule 0     Sig: Take 1 capsule (500 mg total) by mouth every 8 (eight) hours. for 10 days     Authorizing Provider: AROLDO ESQUIVEL

## 2024-08-15 ENCOUNTER — NURSE TRIAGE (OUTPATIENT)
Dept: ADMINISTRATIVE | Facility: CLINIC | Age: 54
End: 2024-08-15
Payer: MEDICAID

## 2024-08-15 ENCOUNTER — ON-DEMAND VIRTUAL (OUTPATIENT)
Dept: URGENT CARE | Facility: CLINIC | Age: 54
End: 2024-08-15
Payer: MEDICAID

## 2024-08-15 DIAGNOSIS — J06.9 UPPER RESPIRATORY TRACT INFECTION, UNSPECIFIED TYPE: Primary | ICD-10-CM

## 2024-08-15 DIAGNOSIS — R09.81 NASAL CONGESTION: ICD-10-CM

## 2024-08-15 RX ORDER — AZELASTINE 1 MG/ML
2 SPRAY, METERED NASAL 2 TIMES DAILY
Qty: 30 ML | Refills: 0 | Status: SHIPPED | OUTPATIENT
Start: 2024-08-15 | End: 2025-08-15

## 2024-08-15 NOTE — PROGRESS NOTES
Subjective:      Patient ID: Yolanda Betts is a 54 y.o. female.    Vitals:  vitals were not taken for this visit.     Chief Complaint: Cough, Sinus Problem, Sore Throat, and sneezing      Visit Type: TELE AUDIOVISUAL    Present with the patient at the time of consultation: TELEMED PRESENT WITH PATIENT: None, at home    Past Medical History:   Diagnosis Date    Abnormal Pap smear of cervix     HPV genital warts    Anemia     Anxiety     Arthritis     Asthma 10/11/2016    Bipolar 1 disorder     Diabetes mellitus, type 2     Dyslipidemia associated with type 2 diabetes mellitus 2019    General anesthetics causing adverse effect in therapeutic use     Genital warts     GERD (gastroesophageal reflux disease)     Herpes simplex virus (HSV) infection     Hyperlipidemia     Hypertension     Hypertension complicating diabetes 2019    Migraine with aura and without status migrainosus, not intractable 2016    Mild persistent asthma without complication 10/11/2016    Morbid obesity with body mass index (BMI) of 45.0 to 49.9 in adult 2017    Obstructive sleep apnea     ALEN (obstructive sleep apnea)     2L per N/C q HS    Schizoaffective disorder, bipolar type 2019    Seasonal allergic rhinitis due to pollen 2019    Type 2 diabetes mellitus with hyperglycemia, with long-term current use of insulin 2017    Type 2 diabetes mellitus with hyperglycemia, without long-term current use of insulin 2017     Past Surgical History:   Procedure Laterality Date    abcess removed right back      BELT ABDOMINOPLASTY      BREAST SURGERY Bilateral     Reduction    CARPAL TUNNEL RELEASE Bilateral 2023    Procedure: RELEASE, CARPAL TUNNEL;  Surgeon: Neville Roth MD;  Location: [facility];  Service: Orthopedics;  Laterality: Bilateral;  bilateral carpal tunnel release     SECTION      X 2    COLONOSCOPY      COLONOSCOPY N/A 2018    Procedure: colonoscopy for  iron deficiency anemia;  Surgeon: Frankie Sanchez MD;  Location: [facility] ENDO;  Service: Endoscopy;  Laterality: N/A;    COLONOSCOPY N/A 2/28/2018    Procedure: COLONOSCOPY;  Surgeon: Frankie Sanchez MD;  Location: [facility] ENDO;  Service: Endoscopy;  Laterality: N/A;    COLONOSCOPY N/A 3/10/2023    Procedure: COLONOSCOPY;  Surgeon: Lalita Montes De Oca MD;  Location: [facility] ENDO;  Service: Endoscopy;  Laterality: N/A;    COLONOSCOPY N/A 4/4/2024    Procedure: COLONOSCOPY;  Surgeon: Tara Og MD;  Location: [facility] ENDO;  Service: Endoscopy;  Laterality: N/A;    EPIDURAL STEROID INJECTION INTO CERVICAL SPINE N/A 1/31/2023    Procedure: C6/7 IL ROCAEL RN IV Sedation;  Surgeon: Otto Phillips MD;  Location: [facility] PAIN MGT;  Service: Pain Management;  Laterality: N/A;    EPIDURAL STEROID INJECTION INTO CERVICAL SPINE N/A 1/30/2024    Procedure: C5/6 IL ROCAEL;  Surgeon: Otto Phillips MD;  Location: [facility] PAIN MGT;  Service: Pain Management;  Laterality: N/A;    ESOPHAGOGASTRODUODENOSCOPY N/A 3/10/2023    Procedure: EGD (ESOPHAGOGASTRODUODENOSCOPY);  Surgeon: Lalita Montes De Oca MD;  Location: [facility];  Service: Endoscopy;  Laterality: N/A;    HYSTERECTOMY      w/ BSO; hypermenorrhea    INJECTION OF ANESTHETIC AGENT AROUND MEDIAL BRANCH NERVES INNERVATING CERVICAL FACET JOINT Bilateral 12/19/2023    Procedure: Bilateral C5-7 MBB (diagnostic);  Surgeon: Otto Phillips MD;  Location: [facility] PAIN MGT;  Service: Pain Management;  Laterality: Bilateral;    MOUTH SURGERY  1996    OOPHORECTOMY      hyst/bso, hypermenorrhea    ROBOT-ASSISTED CHOLECYSTECTOMY USING DA DARI XI N/A 1/3/2020    Procedure: XI ROBOTIC CHOLECYSTECTOMY;  Surgeon: Damir Driscoll MD;  Location: [facility] OR;  Service: General;  Laterality: N/A;    TOTAL REDUCTION MAMMOPLASTY      TUBAL LIGATION       Review of patient's allergies indicates:   Allergen Reactions    Codeine Itching    Hydromorphone Other (See Comments)     Can't wake up for long time if  "taken    Slow to wake up after surgery after receiving    Aleve [naproxen sodium]      Increases BP    Ibuprofen      Increases BP    Neuromuscular blockers, steroidal Hives     some    Pregabalin Itching    Latex, natural rubber Rash    Morphine Rash     itching    Norco [hydrocodone-acetaminophen] Itching, Rash and Hallucinations    Secobarbital sodium Rash     itching    Tylox [oxycodone-acetaminophen] Rash     Current Outpatient Medications on File Prior to Visit   Medication Sig Dispense Refill    albuterol (PROVENTIL) 2.5 mg /3 mL (0.083 %) nebulizer solution Take 2.5 mg by nebulization every 4 (four) hours as needed. Times a day 75 mL 1    albuterol (PROVENTIL/VENTOLIN HFA) 90 mcg/actuation inhaler Inhale 2 puffs into the lungs every 6 (six) hours as needed for Wheezing. 8.5 g 1    ALPRAZolam (XANAX) 1 MG tablet Take 1 tablet (1 mg total) by mouth 2 (two) times daily as needed for Anxiety. 60 tablet 2    amlodipine-valsartan (EXFORGE)  mg per tablet Take 1 tablet by mouth once daily. 90 tablet 3    amlodipine-valsartan (EXFORGE)  mg per tablet Take 1 tablet by mouth in the morning. 90 tablet 1    amlodipine-valsartan (EXFORGE)  mg per tablet Take 1 tablet by mouth every morning 90 tablet 1    amoxicillin (AMOXIL) 500 MG capsule Take 1 capsule (500 mg total) by mouth every 8 (eight) hours. for 10 days 30 capsule 0    atorvastatin (LIPITOR) 20 MG tablet Take 1 tablet (20 mg total) by mouth every evening 90 tablet 3    BD INSULIN SYRINGE ULTRA-FINE 1/2 mL 30 gauge x 1/2" Syrg   0    blood-glucose sensor (DEXCOM G6 SENSOR) Mariela change sensor every 10 days. 3 each 11    blood-glucose sensor (DEXCOM G7 SENSOR) Mariela Use to check bg. Change every 10 days 3 each 11    blood-glucose transmitter (DEXCOM G6 TRANSMITTER) Mariela 1 each by Misc.(Non-Drug; Combo Route) route every 3 (three) months. 1 each 3    blood-glucose transmitter (DEXCOM G6 TRANSMITTER) Mariela Use as directed change every 3 months 1 each " "3    celecoxib (CELEBREX) 100 MG capsule Take 1 capsule (100 mg total) by mouth 2 (two) times daily. 60 capsule 2    cyclobenzaprine (FLEXERIL) 10 MG tablet Take 1 tablet (10 mg total) by mouth 3 (three) times daily as needed (Pain). 90 tablet 11    DULoxetine (CYMBALTA) 60 MG capsule Take 1 capsule by mouth 2 times daily 180 capsule 3    ergocalciferol (VITAMIN D2) 50,000 unit Cap Take 1 capsule twice weekly for 3 weeks then weekly 12 capsule 3    estradioL (ESTRACE) 0.01 % (0.1 mg/gram) vaginal cream Place 1 g vaginally twice a week. 42.5 g 3    fenofibrate (TRICOR) 145 MG tablet Take 1 tablet (145 mg total) by mouth once daily. 90 tablet 3    frovatriptan (FROVA) 2.5 MG tablet Take 2.5 mg by mouth daily as needed.      furosemide (LASIX) 20 MG tablet Take 1 tablet (20 mg total) by mouth once daily. 90 tablet 3    glucagon (BAQSIMI) 3 mg/actuation Spry 1 spray by Nasal route as needed (hypoglycemia). For emergency use. May repeat 1 time after 15 minutes (Patient not taking: Reported on 8/8/2024) 2 each 1    glucagon (BAQSIMI) 3 mg/actuation [place] SPRAY 1 SPRAY IN NOSTRIL AS NEEDED FOR EMERGENCY. MAY REPEAT 1 TIME AFTER 15 MINUTES 2 each 1    insulin aspart U-100 (NOVOLOG U-100 INSULIN ASPART) 100 unit/mL injection To use via insulin pump. Up to 200 units every 3 days 20 mL 5    insulin aspart U-100 (NOVOLOG) 100 unit/mL injection NovoLOG FlexPen      insulin pump cart,automated,BT (OMNIPOD 5 G6 PODS, GEN 5,) Crtg Inject into skin every 3 days as directed 30 each 3    insulin syringe-needle U-100 0.3 mL 30 gauge x 5/16" Syrg 1 each by Misc.(Non-Drug; Combo Route) route Every 3 (three) days. 100 each 2    lamoTRIgine (LAMICTAL) 200 MG tablet Take 1 tablet (200 mg total) by mouth 2 (two) times daily. 60 tablet 2    levocetirizine (XYZAL) 5 MG tablet Take 1 tablet (5 mg total) by mouth every evening. 30 tablet 11    LIDOcaine (LIDODERM) 5 % Place 2 patches onto the skin every 12 (twelve) hours as needed (pain). Remove " "& Discard patch within 12 hours or as directed by MD 60 patch 11    lifitegrast (XIIDRA) 5 % Dpet Place 1 drop into both eyes 2 (two) times daily. 60 each 12    lumateperone (CAPLYTA) 10.5 mg Cap Take 1 capsule (10.5 mg total) by mouth once daily. 30 capsule 2    magnesium oxide (MAG-OX) 400 mg (241.3 mg magnesium) tablet Take 1 tablet (400 mg total) by mouth once daily. 90 tablet 0    methylPREDNISolone (MEDROL DOSEPACK) 4 mg tablet use as directed 21 tablet 1    metoprolol succinate (TOPROL-XL) 50 MG 24 hr tablet Take 1 tablet (50 mg total) by mouth every morning. 30 tablet 11    mirtazapine (REMERON) 30 MG tablet Take 1 tablet by mouth every evening.      nebulizer and compressor (COMP-AIR NEBULIZER COMPRESSOR) Mariela use as directed 1 each 0    omeprazole (PRILOSEC) 40 MG capsule Take 1 capsule (40 mg total) by mouth once daily. 90 capsule 1    OZEMPIC 2 mg/dose (8 mg/3 mL) PnIj Inject 2 mg into the skin every 7 days.      pen needle, diabetic (BD ULTRA-FINE MINI PEN NEEDLE) 31 gauge x 3/16" Ndle Use one needle 5 times a day 200 each 11    pen needle, diabetic (BD ULTRA-FINE MINI PEN NEEDLE) 31 gauge x 3/16" Ndle Use 5 a day 200 each 11    pen needle, diabetic (BD ULTRA-FINE MINI PEN NEEDLE) 31 gauge x 3/16" Ndle USE TO INJECT INSULIN 5 TIMES DAILY 200 each 10    polyethylene glycol (GLYCOLAX) 17 gram/dose powder Take 17 g by mouth once daily. 1530 g 3    promethazine (PHENERGAN) 25 MG tablet Take 25 mg by mouth every 6 (six) hours as needed.      secukinumab (COSENTYX PEN, 2 PENS,) 150 mg/mL PnIj Inject 300 mg into the skin every 14 (fourteen) days. 4 mL 3    SYMBICORT 160-4.5 mcg/actuation HFAA Inhale 2 puffs into the lungs in the morning and 2 puffs before bedtime. 2 puffs every 12 hours 10.2 g 2    zolpidem (AMBIEN) 5 MG Tab Take 1 tablet (5 mg total) by mouth nightly as needed (anxiety). 30 tablet 2    [DISCONTINUED] clonazePAM (KLONOPIN) 1 MG tablet Take 1 tablet by mouth daily 30 tablet 0    [DISCONTINUED] " "glucagon, human recombinant, (GLUCAGON EMERGENCY KIT, HUMAN,) 1 mg SolR Inject 1 mg into the muscle as needed. Emergency use 1 each 1    [DISCONTINUED] valsartan (DIOVAN) 320 MG tablet Take 1 tablet (320 mg total) by mouth once daily. 90 tablet 3     Current Facility-Administered Medications on File Prior to Visit   Medication Dose Route Frequency Provider Last Rate Last Admin    [COMPLETED] iohexoL (OMNIPAQUE 12) oral solution 1,000 mL  1,000 mL Oral ONCE PRN Clarke Hernandez MD   1,000 mL at 08/14/24 1115    [COMPLETED] iohexoL (OMNIPAQUE 350) injection 100 mL  100 mL Intravenous ONCE PRN Clarke Hernandez MD   100 mL at 08/14/24 1254    ondansetron injection 4 mg  4 mg Intravenous Once PRN Otto Phillips MD        [DISCONTINUED] iohexoL (OMNIPAQUE 12) oral solution 1,000 mL  1,000 mL Oral ONCE PRN Sylvain Rainey MD        [DISCONTINUED] iohexoL (OMNIPAQUE 12) oral solution 1,000 mL  1,000 mL Oral ONCE PRN Clarke Hernandez MD        [DISCONTINUED] iohexoL (OMNIPAQUE 12) oral solution 1,000 mL  1,000 mL Oral ONCE PRN Sylvain Rainey MD        [DISCONTINUED] iohexoL (OMNIPAQUE 350) injection 100 mL  100 mL Intravenous ONCE PRN Sylvain Rainey MD        [DISCONTINUED] iohexoL (OMNIPAQUE 350) injection 100 mL  100 mL Intravenous ONCE PRN Sylvain Rainey MD         Family History   Problem Relation Name Age of Onset    Diabetes Mother      Hyperlipidemia Mother      Hypertension Mother      Asthma Mother      COPD Mother      Glaucoma Mother      Thyroid disease Mother      Anesthesia problems Mother          "almost had a cardiac arrest" , blood clots    Hypertension Father      Hyperlipidemia Father      Cancer Father          Brain, lung, liver, kidney    No Known Problems Sister      Hypertension Brother      Alcohol abuse Brother      Heart disease Maternal Grandmother      Hyperlipidemia Maternal Grandmother      Hypertension Maternal Grandmother      Cataracts Maternal Grandmother      " Diabetes Maternal Grandmother      Heart disease Maternal Grandfather      Hyperlipidemia Maternal Grandfather      Hypertension Maternal Grandfather      Glaucoma Maternal Grandfather      Cancer Maternal Grandfather      Cataracts Maternal Grandfather      Macular degeneration Maternal Grandfather      Diabetes Maternal Grandfather      Heart disease Paternal Grandmother      Hyperlipidemia Paternal Grandmother      Hypertension Paternal Grandmother      Cataracts Paternal Grandmother      Heart disease Paternal Grandfather      Hyperlipidemia Paternal Grandfather      Hypertension Paternal Grandfather      Cataracts Paternal Grandfather      Breast cancer Maternal Cousin      Breast cancer Maternal Cousin      Breast cancer Maternal Cousin      Breast cancer Maternal Cousin         Medications Ordered                [facility]   [address] [address]    Telephone: [phone]   Fax: [phone]   Hours: Mon-Fri, 8a-5:30p                         Internal Pharmacy ()              azelastine (ASTELIN) 137 mcg (0.1 %) nasal spray    Si sprays (274 mcg total) by Nasal route 2 (two) times daily.       Start: 8/15/24     Quantity: 30 mL Refills: 0                           Ohs Peq Odvv Intake    8/15/2024  8:30 AM CDT - Filed by Patient   What is your current physical address in the event of a medical emergency?    Are you able to take your vital signs? No   Please attach any relevant images or files          Hx of asthma and pleurisy. Currently on amoxicillin. COVID negative. Worsening congestion. No relief with RX and OTC meds. Seeking further treatment options.    Cough  Associated symptoms include ear pain (pressure), myalgias, postnasal drip, a sore throat, shortness of breath and wheezing. Pertinent negatives include no chills or fever.   Sinus Problem  Associated symptoms include congestion, coughing, ear pain (pressure), shortness of breath, sneezing and a sore  throat. Pertinent negatives include no chills.   Sore Throat   Associated symptoms include congestion, coughing, ear pain (pressure) and shortness of breath. Pertinent negatives include no diarrhea or vomiting.       Constitution: Negative for chills and fever.   HENT:  Positive for ear pain (pressure), congestion, postnasal drip and sore throat.    Respiratory:  Positive for cough, sputum production, shortness of breath, wheezing and asthma.    Gastrointestinal:  Positive for nausea. Negative for vomiting and diarrhea.   Musculoskeletal:  Positive for muscle ache.   Allergic/Immunologic: Positive for asthma and sneezing.        Objective:   The physical exam was conducted virtually.  Physical Exam   Constitutional: She is oriented to person, place, and time. She does not appear ill. No distress.   HENT:   Head: Normocephalic and atraumatic.   Nose: Nose normal.   Eyes: Extraocular movement intact   Pulmonary/Chest: Effort normal.   Abdominal: Normal appearance.   Musculoskeletal: Normal range of motion.         General: Normal range of motion.   Neurological: no focal deficit. She is alert and oriented to person, place, and time.   Psychiatric: Her behavior is normal. Mood normal.   Vitals reviewed.      Assessment:     1. Upper respiratory tract infection, unspecified type    2. Nasal congestion        Plan:   Further testing recommended.    Patient encouraged to monitor symptoms closely and instructed to follow-up for new or worsening symptoms. Further, in-person, evaluation may be necessary for continued treatment. Please follow up with your primary care doctor or specialist as needed. Verbally discussed plan. Patient confirms understanding and is in agreement with treatment and plan.     You must understand that you've received a Virtual Care evaluation only and that you may be released before all your medical problems are known or treated. You, the patient, will arrange for follow up care as instructed.     Upper respiratory tract infection, unspecified type    Nasal congestion  -     azelastine (ASTELIN) 137 mcg (0.1 %) nasal spray; 2 sprays (274 mcg total) by Nasal route 2 (two) times daily.  Dispense: 30 mL; Refill: 0        Patient Instructions   OVER THE COUNTER RECOMMENDATIONS/SUGGESTIONS (IF NO CONTRAINDICATIONS).     ·         Make sure to stay well hydrated.     ·         Use Nasal Saline to mechanically move any post nasal drip from your eustachian tube or from the back of your throat.     ·         Use warm saltwater gargles to ease your throat pain. Warm saltwater gargles as needed for sore throat-  1/2 tsp salt to 1 cup warm water, gargle as desired. Warm fluids tend to relieve a sore throat.     .         Throat lozenges, Chloraseptic spray or other over the counter treatments are ok to use as well. Use as directed.     ·         Use an antihistamine such as Claritin, Zyrtec or Allegra to dry you out.     ·         Use pseudoephedrine (behind the counter) to decongest. Pseudoephedrine  30 mg up to 240 mg /day. It can raise your blood pressure and give you palpitations.     ·         Use Mucinex (guaifenesin) to break up mucous up to 2400mg/day to loosen any mucous.     ·         The Mucinex DM pill has a cough suppressant that can be sedating. It can be used at night to stop the tickle at the back of your throat.     ·         You can use Mucinex D (it has guaifenesin and a high dose of pseudoephedrine) in the mornings to help decongest.     ·         Use Afrin (oxymetazoline) in each nare for no longer than 3 days, as it is addictive. It can also dry out your mucous membranes and cause elevated blood pressure. This is especially useful if you are flying.     ·         Use Flonase 1-2 sprays/nostril per day. It is a local acting steroid nasal spray, if you develop a bloody nose, stop using the medication immediately.     ·         Sometimes Nyquil at night is beneficial to help you get some rest,  however it is sedating, and it does have an antihistamine, and Tylenol.     ·         Honey is a natural cough suppressant that can be used.     ·         Tylenol up to 4,000 mg a day is safe for short periods and can be used for body aches, pain, and fever. However, in high doses and prolonged use it can cause liver irritation.

## 2024-08-15 NOTE — TELEPHONE ENCOUNTER
Congestion, sneezing, sore throat, sinus drip, cough, and nausea. Symptoms started yesterday. Ears are popping but not aching. Care advice recommend pt see Md today or tomorrow. Pt offered and accepted on demand virtual visit.   Reason for Disposition   Using nasal washes and pain medicine > 24 hours and sinus pain (lower forehead, cheekbone, or eye) persists    Additional Information   Negative: SEVERE difficulty breathing (e.g., struggling for each breath, speaks in single words)   Negative: Very weak (can't stand)   Negative: Sounds like a life-threatening emergency to the triager   Negative: Patient sounds very sick or weak to the triager   Negative: Fever > 103 F (39.4 C)   Negative: Fever > 101 F (38.3 C) and over 60 years of age   Negative: Fever > 100.0 F (37.8 C) and has diabetes mellitus or a weak immune system (e.g., HIV positive, cancer chemotherapy, organ transplant, splenectomy, chronic steroids)   Negative: Fever > 100.0 F (37.8 C) and bedridden (e.g., CVA, chronic illness, recovering from surgery)   Negative: Fever present > 3 days (72 hours)   Negative: Fever returns after gone for over 24 hours and symptoms worse or not improved   Negative: Sinus pain (not just congestion) and fever   Negative: Earache   Negative: Sinus congestion (pressure, fullness) present > 10 days   Negative: Nasal discharge present > 10 days    Protocols used: Common Cold-A-OH

## 2024-08-20 ENCOUNTER — TELEPHONE (OUTPATIENT)
Dept: DIABETES | Facility: CLINIC | Age: 54
End: 2024-08-20
Payer: MEDICAID

## 2024-08-20 ENCOUNTER — TELEPHONE (OUTPATIENT)
Dept: SLEEP MEDICINE | Facility: CLINIC | Age: 54
End: 2024-08-20
Payer: MEDICAID

## 2024-08-20 DIAGNOSIS — Z79.4 TYPE 2 DIABETES MELLITUS WITH HYPERGLYCEMIA, WITH LONG-TERM CURRENT USE OF INSULIN: Primary | ICD-10-CM

## 2024-08-20 DIAGNOSIS — E11.65 TYPE 2 DIABETES MELLITUS WITH HYPERGLYCEMIA, WITH LONG-TERM CURRENT USE OF INSULIN: Primary | ICD-10-CM

## 2024-08-20 RX ORDER — BLOOD-GLUCOSE,RECEIVER,CONT
1 EACH MISCELLANEOUS ONCE
Qty: 1 EACH | Refills: 0 | Status: SHIPPED | OUTPATIENT
Start: 2024-08-20 | End: 2024-08-21

## 2024-08-20 NOTE — TELEPHONE ENCOUNTER
Coverage for Elizabeth Ceron NP:     Dexcom G7  Rx to check coverage. Pt lost hers. Please let pt know if not covered by insurance to call us back and we may be able to get her one.     Ayush Palomares PA-C, BC-ADM  [facility]

## 2024-08-20 NOTE — TELEPHONE ENCOUNTER
Patient lost Dexcom  and needs replacement. Would like to know if insurance will cover replacement or what OOP cost would be. Sent message to Elizabeth Ceron.     ----- Message from Winifred Balderas MA sent at 8/20/2024  9:30 AM CDT -----  Type:  Needs Medical Advice/Symptom-based Call    Who Called:  Pt   Symptoms (please be specific):  Needs another phone to assist with her insulin due to she can't find current one   How long has patient had these symptoms:    Since Thursday-Friday last week   Pharmacy:    Would the patient rather a call back or a response via [facility]?  Callback    Best Call Back Number:  Telephone Information:  Mobile          [phone]     Additional Information:   Needs call from Yara Sanchez RD, CDE

## 2024-08-20 NOTE — TELEPHONE ENCOUNTER
----- Message from Consuelo Narayan MA sent at 8/20/2024  1:01 PM CDT -----  Contact: self [phone]  See message below. I don't know what else to do or tell this lady. I literally talked to her everyday last week! :-(  ----- Message -----  From: Tania Pfeiffer  Sent: 8/20/2024  12:54 PM CDT  To: Redd Narayan Staff    Type:  Needs Medical Advice    Who Called: Yolanda  Symptoms (please be specific): coughing wheezing nausea headache pleurisy  How long has patient had these symptoms:  since last week  Pharmacy name and phone #:    [facility]  [address]  [place] [address]  Phone: [phone] Fax: [phone]  Would the patient rather a call back or a response via [facility]? Call back  Best Call Back Number: [phone]    Additional Information: patient called in stating she is still feeling  the affects of having covid and pleurisy and weakness and needs to know what else she can take. Please call back  [phone]

## 2024-08-20 NOTE — TELEPHONE ENCOUNTER
Spoke with her  Recently tested +ve for covid    > 5 days  Not eligible for paxlovid    Encouraged OTC meds:

## 2024-08-21 ENCOUNTER — PATIENT MESSAGE (OUTPATIENT)
Dept: GASTROENTEROLOGY | Facility: CLINIC | Age: 54
End: 2024-08-21
Payer: MEDICAID

## 2024-08-22 ENCOUNTER — NURSE TRIAGE (OUTPATIENT)
Dept: ADMINISTRATIVE | Facility: CLINIC | Age: 54
End: 2024-08-22
Payer: MEDICAID

## 2024-08-22 ENCOUNTER — TELEPHONE (OUTPATIENT)
Dept: DIABETES | Facility: CLINIC | Age: 54
End: 2024-08-22
Payer: MEDICAID

## 2024-08-22 ENCOUNTER — ON-DEMAND VIRTUAL (OUTPATIENT)
Dept: URGENT CARE | Facility: CLINIC | Age: 54
End: 2024-08-22
Payer: MEDICAID

## 2024-08-22 DIAGNOSIS — B37.0 ORAL THRUSH: Primary | ICD-10-CM

## 2024-08-22 RX ORDER — NYSTATIN 100000 [USP'U]/ML
4 SUSPENSION ORAL 4 TIMES DAILY
Qty: 160 ML | Refills: 0 | Status: SHIPPED | OUTPATIENT
Start: 2024-08-22 | End: 2024-09-01

## 2024-08-22 NOTE — PATIENT INSTRUCTIONS
Patient Education       Thrush Discharge Instructions   About this topic   Thrush is a type of yeast infection that you can get in your mouth. Germs, called fungi, cause yeast infections. These germs live almost everywhere on your body. Most often, your immune system can control the amount of yeast and you stay healthy. If you are sick, the yeast can multiply and cause an infection.  Thrush causes white patches inside your mouth. You may also have redness, bleeding, or soreness. In other parts of your body, yeast infections can cause itching, burning or cracking of the skin. People who are breastfeeding may develop thrush on their nipples. Babies who are  can also develop oral thrush.     What care is needed at home?   Ask your doctor what you need to do when you go home. Make sure you ask questions if you do not understand what the doctor says. This way you will know what you need to do.  For adults and children:  Brush your teeth and tongue at least two times each day with a soft toothbrush. Floss every night. Change your toothbrush as ordered.  If you have dentures or retainers, clean them well every night.  Do not use over-the-counter (OTC) mouthwashes. They can kill healthy bacteria, which you may need to help recover.  Rinse your mouth with warm salt water a few times a day. Mix 1/2 teaspoon (2.5 grams) salt with a cup (240 mL) of warm water.  For infants and toddlers:  Wash your baby's bottle, bottle nipples, and pacifiers each day in hot water.  Wash sippy cups and toys that children put in the mouth each day in hot water.  For breastfeeding moms:  Wash bras, bra pads, nightgowns, or any items that come into contact with your nipples in hot water and dry in a hot dryer.  Rinse nipples after each breastfeeding.  Clean all parts of the breast pump which come into contact with the milk.  What follow-up care is needed?   Your doctor may ask you to make visits to the office to check on your progress. Be  sure to keep these visits.  What drugs may be needed?   The doctor may order drugs to fight an infection. The drug may be a pill, mouthwash, or lozenge that you suck on.  Will physical activity be limited?   Physical activity will not be limited.  What changes to diet are needed?   Ask your doctor if eating yogurt may help.  If your mouth is sore, eat soft foods like soup and pureed fruits and vegetables.  What problems could happen?   Thrush may spread to other parts of the body  Treatment does not work  Thrush comes back  What can be done to prevent this health problem?   See your dentist regularly. You may need to go more often if you have diabetes or dentures.  If you have diabetes, keep your blood sugar under control.  If you use an inhaler, rinse your mouth after each use.  If you smoke, try to quit.  If you get thrush often, you may need to take drugs every day to keep from getting it again.  When do I need to call the doctor?   Fever of 100.4°F (38°C) or higher  Signs of not getting enough water. These include dry mouth, very thirsty, or little or no urine.  Trouble swallowing or stiff neck or jaw  You are not feeling better in 2 to 3 days or you are feeling worse  Teach Back: Helping You Understand   The Teach Back Method helps you understand the information we are giving you. After you talk with the staff, tell them in your own words what you learned. This helps to make sure the staff has described each thing clearly. It also helps to explain things that may have been confusing. Before going home, make sure you can do these:  I can tell you about my condition.  I can tell you how to care for my babys bottles, nipples, and pacifiers if my baby has thrush.  I can tell you what signs will make me think I am not getting enough water.  Where can I learn more?   [employer]  [email or web address]    [facility]  [email or web address]   Last Reviewed Date   2021-08-30  Consumer Information Use and Disclaimer   This information is not specific medical advice and does not replace information you receive from your health care provider. This is only a brief summary of general information. It does NOT include all information about conditions, illnesses, injuries, tests, procedures, treatments, therapies, discharge instructions or life-style choices that may apply to you. You must talk with your health care provider for complete information about your health and treatment options. This information should not be used to decide whether or not to accept your health care providers advice, instructions or recommendations. Only your health care provider has the knowledge and training to provide advice that is right for you.  Copyright   Copyright © 2021 [employer] and its affiliates and/or licensors. All rights reserved.

## 2024-08-22 NOTE — PROGRESS NOTES
Subjective:      Patient ID: Yolanda Betts is a 54 y.o. female.    Vitals:  vitals were not taken for this visit.     Chief Complaint: Dizziness, Nausea, Headache, and Sore Throat      Visit Type: TELE AUDIOVISUAL    Present with the patient at the time of consultation: TELEMED PRESENT WITH PATIENT: None, at home    Past Medical History:   Diagnosis Date    Abnormal Pap smear of cervix     HPV genital warts    Anemia     Anxiety     Arthritis     Asthma 10/11/2016    Bipolar 1 disorder     Diabetes mellitus, type 2     Dyslipidemia associated with type 2 diabetes mellitus 2019    General anesthetics causing adverse effect in therapeutic use     Genital warts     GERD (gastroesophageal reflux disease)     Herpes simplex virus (HSV) infection     Hyperlipidemia     Hypertension     Hypertension complicating diabetes 2019    Migraine with aura and without status migrainosus, not intractable 2016    Mild persistent asthma without complication 10/11/2016    Morbid obesity with body mass index (BMI) of 45.0 to 49.9 in adult 2017    Obstructive sleep apnea     ALEN (obstructive sleep apnea)     2L per N/C q HS    Schizoaffective disorder, bipolar type 2019    Seasonal allergic rhinitis due to pollen 2019    Type 2 diabetes mellitus with hyperglycemia, with long-term current use of insulin 2017    Type 2 diabetes mellitus with hyperglycemia, without long-term current use of insulin 2017     Past Surgical History:   Procedure Laterality Date    abcess removed right back      BELT ABDOMINOPLASTY      BREAST SURGERY Bilateral     Reduction    CARPAL TUNNEL RELEASE Bilateral 2023    Procedure: RELEASE, CARPAL TUNNEL;  Surgeon: Neville Roth MD;  Location: [facility];  Service: Orthopedics;  Laterality: Bilateral;  bilateral carpal tunnel release     SECTION      X 2    COLONOSCOPY      COLONOSCOPY N/A 2018    Procedure: colonoscopy for iron  deficiency anemia;  Surgeon: Frankie Sanchez MD;  Location: [facility] ENDO;  Service: Endoscopy;  Laterality: N/A;    COLONOSCOPY N/A 2/28/2018    Procedure: COLONOSCOPY;  Surgeon: Frankie Sanchez MD;  Location: [facility] ENDO;  Service: Endoscopy;  Laterality: N/A;    COLONOSCOPY N/A 3/10/2023    Procedure: COLONOSCOPY;  Surgeon: Lalita Montes De Oca MD;  Location: [facility] ENDO;  Service: Endoscopy;  Laterality: N/A;    COLONOSCOPY N/A 4/4/2024    Procedure: COLONOSCOPY;  Surgeon: Tara Og MD;  Location: [facility] ENDO;  Service: Endoscopy;  Laterality: N/A;    EPIDURAL STEROID INJECTION INTO CERVICAL SPINE N/A 1/31/2023    Procedure: C6/7 IL ROCAEL RN IV Sedation;  Surgeon: Otto Phillips MD;  Location: [facility] PAIN MGT;  Service: Pain Management;  Laterality: N/A;    EPIDURAL STEROID INJECTION INTO CERVICAL SPINE N/A 1/30/2024    Procedure: C5/6 IL ROCAEL;  Surgeon: Otto Phillips MD;  Location: [facility] PAIN MGT;  Service: Pain Management;  Laterality: N/A;    ESOPHAGOGASTRODUODENOSCOPY N/A 3/10/2023    Procedure: EGD (ESOPHAGOGASTRODUODENOSCOPY);  Surgeon: Lalita Montes De Oca MD;  Location: [facility];  Service: Endoscopy;  Laterality: N/A;    HYSTERECTOMY      w/ BSO; hypermenorrhea    INJECTION OF ANESTHETIC AGENT AROUND MEDIAL BRANCH NERVES INNERVATING CERVICAL FACET JOINT Bilateral 12/19/2023    Procedure: Bilateral C5-7 MBB (diagnostic);  Surgeon: Otto Phillips MD;  Location: [facility] PAIN MGT;  Service: Pain Management;  Laterality: Bilateral;    MOUTH SURGERY  1996    OOPHORECTOMY      hyst/bso, hypermenorrhea    ROBOT-ASSISTED CHOLECYSTECTOMY USING DA DARI XI N/A 1/3/2020    Procedure: XI ROBOTIC CHOLECYSTECTOMY;  Surgeon: Damir Driscoll MD;  Location: [facility] OR;  Service: General;  Laterality: N/A;    TOTAL REDUCTION MAMMOPLASTY      TUBAL LIGATION       Review of patient's allergies indicates:   Allergen Reactions    Codeine Itching    Hydromorphone Other (See Comments)     Can't wake up for long time if  "taken    Slow to wake up after surgery after receiving    Aleve [naproxen sodium]      Increases BP    Ibuprofen      Increases BP    Neuromuscular blockers, steroidal Hives     some    Pregabalin Itching    Latex, natural rubber Rash    Morphine Rash     itching    Norco [hydrocodone-acetaminophen] Itching, Rash and Hallucinations    Secobarbital sodium Rash     itching    Tylox [oxycodone-acetaminophen] Rash     Current Outpatient Medications on File Prior to Visit   Medication Sig Dispense Refill    albuterol (PROVENTIL) 2.5 mg /3 mL (0.083 %) nebulizer solution Take 2.5 mg by nebulization every 4 (four) hours as needed. Times a day 75 mL 1    albuterol (PROVENTIL/VENTOLIN HFA) 90 mcg/actuation inhaler Inhale 2 puffs into the lungs every 6 (six) hours as needed for Wheezing. 8.5 g 1    ALPRAZolam (XANAX) 1 MG tablet Take 1 tablet (1 mg total) by mouth 2 (two) times daily as needed for Anxiety. 60 tablet 2    amlodipine-valsartan (EXFORGE)  mg per tablet Take 1 tablet by mouth once daily. 90 tablet 3    amlodipine-valsartan (EXFORGE)  mg per tablet Take 1 tablet by mouth in the morning. 90 tablet 1    amlodipine-valsartan (EXFORGE)  mg per tablet Take 1 tablet by mouth every morning 90 tablet 1    amoxicillin (AMOXIL) 500 MG capsule Take 1 capsule (500 mg total) by mouth every 8 (eight) hours. for 10 days 30 capsule 0    atorvastatin (LIPITOR) 20 MG tablet Take 1 tablet (20 mg total) by mouth every evening 90 tablet 3    azelastine (ASTELIN) 137 mcg (0.1 %) nasal spray 2 sprays (274 mcg total) by Nasal route 2 (two) times daily. 30 mL 0    BD INSULIN SYRINGE ULTRA-FINE 1/2 mL 30 gauge x 1/2" Syrg   0    blood-glucose meter,continuous (DEXCOM G7 ) Misc 1 each by Misc.(Non-Drug; Combo Route) route once. for 1 dose 1 each 0    blood-glucose sensor (DEXCOM G6 SENSOR) Mariela change sensor every 10 days. 3 each 11    blood-glucose sensor (DEXCOM G7 SENSOR) Mariela Use to check bg. Change every 10 " "days 3 each 11    blood-glucose transmitter (DEXCOM G6 TRANSMITTER) Mariela 1 each by Misc.(Non-Drug; Combo Route) route every 3 (three) months. 1 each 3    blood-glucose transmitter (DEXCOM G6 TRANSMITTER) Mariela Use as directed change every 3 months 1 each 3    celecoxib (CELEBREX) 100 MG capsule Take 1 capsule (100 mg total) by mouth 2 (two) times daily. 60 capsule 2    cyclobenzaprine (FLEXERIL) 10 MG tablet Take 1 tablet (10 mg total) by mouth 3 (three) times daily as needed (Pain). 90 tablet 11    DULoxetine (CYMBALTA) 60 MG capsule Take 1 capsule by mouth 2 times daily 180 capsule 3    ergocalciferol (VITAMIN D2) 50,000 unit Cap Take 1 capsule twice weekly for 3 weeks then weekly 12 capsule 3    estradioL (ESTRACE) 0.01 % (0.1 mg/gram) vaginal cream Place 1 g vaginally twice a week. 42.5 g 3    fenofibrate (TRICOR) 145 MG tablet Take 1 tablet (145 mg total) by mouth once daily. 90 tablet 3    frovatriptan (FROVA) 2.5 MG tablet Take 2.5 mg by mouth daily as needed.      furosemide (LASIX) 20 MG tablet Take 1 tablet (20 mg total) by mouth once daily. 90 tablet 3    glucagon (BAQSIMI) 3 mg/actuation Spry 1 spray by Nasal route as needed (hypoglycemia). For emergency use. May repeat 1 time after 15 minutes (Patient not taking: Reported on 8/8/2024) 2 each 1    glucagon (BAQSIMI) 3 mg/actuation [place] SPRAY 1 SPRAY IN NOSTRIL AS NEEDED FOR EMERGENCY. MAY REPEAT 1 TIME AFTER 15 MINUTES 2 each 1    insulin aspart U-100 (NOVOLOG U-100 INSULIN ASPART) 100 unit/mL injection To use via insulin pump. Up to 200 units every 3 days 20 mL 5    insulin aspart U-100 (NOVOLOG) 100 unit/mL injection NovoLOG FlexPen      insulin pump cart,automated,BT (OMNIPOD 5 G6 PODS, GEN 5,) Crtg Inject into skin every 3 days as directed 30 each 3    insulin syringe-needle U-100 0.3 mL 30 gauge x 5/16" Syrg 1 each by Misc.(Non-Drug; Combo Route) route Every 3 (three) days. 100 each 2    lamoTRIgine (LAMICTAL) 200 MG tablet Take 1 tablet (200 mg " "total) by mouth 2 (two) times daily. 60 tablet 2    levocetirizine (XYZAL) 5 MG tablet Take 1 tablet (5 mg total) by mouth every evening. 30 tablet 11    LIDOcaine (LIDODERM) 5 % Place 2 patches onto the skin every 12 (twelve) hours as needed (pain). Remove & Discard patch within 12 hours or as directed by MD 60 patch 11    lifitegrast (XIIDRA) 5 % Dpet Place 1 drop into both eyes 2 (two) times daily. 60 each 12    lumateperone (CAPLYTA) 10.5 mg Cap Take 1 capsule (10.5 mg total) by mouth once daily. 30 capsule 2    magnesium oxide (MAG-OX) 400 mg (241.3 mg magnesium) tablet Take 1 tablet (400 mg total) by mouth once daily. 90 tablet 0    methylPREDNISolone (MEDROL DOSEPACK) 4 mg tablet use as directed 21 tablet 1    metoprolol succinate (TOPROL-XL) 50 MG 24 hr tablet Take 1 tablet (50 mg total) by mouth every morning. 30 tablet 11    mirtazapine (REMERON) 30 MG tablet Take 1 tablet by mouth every evening.      nebulizer and compressor (COMP-AIR NEBULIZER COMPRESSOR) Mariela use as directed 1 each 0    omeprazole (PRILOSEC) 40 MG capsule Take 1 capsule (40 mg total) by mouth once daily. 90 capsule 1    OZEMPIC 2 mg/dose (8 mg/3 mL) PnIj Inject 2 mg into the skin every 7 days.      pen needle, diabetic (BD ULTRA-FINE MINI PEN NEEDLE) 31 gauge x 3/16" Ndle Use one needle 5 times a day 200 each 11    pen needle, diabetic (BD ULTRA-FINE MINI PEN NEEDLE) 31 gauge x 3/16" Ndle Use 5 a day 200 each 11    pen needle, diabetic (BD ULTRA-FINE MINI PEN NEEDLE) 31 gauge x 3/16" Ndle USE TO INJECT INSULIN 5 TIMES DAILY 200 each 10    polyethylene glycol (GLYCOLAX) 17 gram/dose powder Take 17 g by mouth once daily. 1530 g 3    promethazine (PHENERGAN) 25 MG tablet Take 25 mg by mouth every 6 (six) hours as needed.      secukinumab (COSENTYX PEN, 2 PENS,) 150 mg/mL PnIj Inject 300 mg into the skin every 14 (fourteen) days. 4 mL 3    semaglutide (OZEMPIC) 2 mg/dose (8 mg/3 mL) PnElis Inject 2 mg into the skin every 7 days. 3 mL 2    " "SYMBICORT 160-4.5 mcg/actuation HFAA Inhale 2 puffs into the lungs in the morning and 2 puffs before bedtime. 2 puffs every 12 hours 10.2 g 2    zolpidem (AMBIEN) 5 MG Tab Take 1 tablet (5 mg total) by mouth nightly as needed (anxiety). 30 tablet 2    [DISCONTINUED] clonazePAM (KLONOPIN) 1 MG tablet Take 1 tablet by mouth daily 30 tablet 0    [DISCONTINUED] glucagon, human recombinant, (GLUCAGON EMERGENCY KIT, HUMAN,) 1 mg SolR Inject 1 mg into the muscle as needed. Emergency use 1 each 1    [DISCONTINUED] valsartan (DIOVAN) 320 MG tablet Take 1 tablet (320 mg total) by mouth once daily. 90 tablet 3     Current Facility-Administered Medications on File Prior to Visit   Medication Dose Route Frequency Provider Last Rate Last Admin    ondansetron injection 4 mg  4 mg Intravenous Once PRN Otto Phillips MD         Family History   Problem Relation Name Age of Onset    Diabetes Mother      Hyperlipidemia Mother      Hypertension Mother      Asthma Mother      COPD Mother      Glaucoma Mother      Thyroid disease Mother      Anesthesia problems Mother          "almost had a cardiac arrest" , blood clots    Hypertension Father      Hyperlipidemia Father      Cancer Father          Brain, lung, liver, kidney    No Known Problems Sister      Hypertension Brother      Alcohol abuse Brother      Heart disease Maternal Grandmother      Hyperlipidemia Maternal Grandmother      Hypertension Maternal Grandmother      Cataracts Maternal Grandmother      Diabetes Maternal Grandmother      Heart disease Maternal Grandfather      Hyperlipidemia Maternal Grandfather      Hypertension Maternal Grandfather      Glaucoma Maternal Grandfather      Cancer Maternal Grandfather      Cataracts Maternal Grandfather      Macular degeneration Maternal Grandfather      Diabetes Maternal Grandfather      Heart disease Paternal Grandmother      Hyperlipidemia Paternal Grandmother      Hypertension Paternal Grandmother      Cataracts Paternal " Grandmother      Heart disease Paternal Grandfather      Hyperlipidemia Paternal Grandfather      Hypertension Paternal Grandfather      Cataracts Paternal Grandfather      Breast cancer Maternal Cousin      Breast cancer Maternal Cousin      Breast cancer Maternal Cousin      Breast cancer Maternal Cousin         Medications Ordered                [facility]   [address] [facility] [address]    Telephone: [phone]   Fax: [phone]   Hours: Mon-Fri, 8a-5:30p                         Internal Pharmacy (1 of 1)              nystatin (MYCOSTATIN) 100,000 unit/mL suspension    Sig: Take 4 mLs (400,000 Units total) by mouth 4 (four) times daily. for 10 days       Start: 8/22/24     Quantity: 160 mL Refills: 0                           Ohs Peq Odvv Intake    8/22/2024 10:13 AM CDT - Filed by Patient   What is your current physical address in the event of a medical emergency? [address] [place] [id number]   Are you able to take your vital signs? No   Please attach any relevant images or files          Recent COVID diagnosis. Currently on Amoxicillin for unknown diagnosis. Seen in ED yesterday for continued symptoms. CXR and labs WNL. Discharged home with PCP follow-up. Appt scheduled for 3 weeks. Now today developed a white coating on tongue and sore throat. Can not scrape off the coating. No airway compromise. Seeking further treatment options.    Dizziness:    Associated symptoms: headaches and nausea.  Nausea  Associated symptoms include fatigue, headaches, nausea and a sore throat.   Headache   Associated symptoms include dizziness, nausea and a sore throat.   Sore Throat   Associated symptoms include headaches.       Constitution: Positive for fatigue.   HENT:  Positive for tongue lesion and sore throat. Negative for tongue pain.    Gastrointestinal:  Positive for nausea.   Neurological:  Positive for dizziness and headaches.        Objective:   The physical exam was  conducted virtually.  Physical Exam   Constitutional: She is oriented to person, place, and time. She does not appear ill. No distress.   HENT:   Head: Normocephalic and atraumatic.   Nose: Nose normal.   White coating to the side of the tongue      Comments: White coating to the side of the tongue  Eyes: Extraocular movement intact   Pulmonary/Chest: Effort normal.   Abdominal: Normal appearance.   Musculoskeletal: Normal range of motion.         General: Normal range of motion.   Neurological: no focal deficit. She is alert and oriented to person, place, and time.   Psychiatric: Her behavior is normal. Mood normal.   Vitals reviewed.      Assessment:     1. Oral thrush        Plan:   Follow-up as discussed.    Patient encouraged to monitor symptoms closely and instructed to follow-up for new or worsening symptoms. Further, in-person, evaluation may be necessary for continued treatment. Please follow up with your primary care doctor or specialist as needed. Verbally discussed plan. Patient confirms understanding and is in agreement with treatment and plan.     You must understand that you've received a [facility] Care evaluation only and that you may be released before all your medical problems are known or treated. You, the patient, will arrange for follow up care as instructed.      Oral thrush  -     nystatin (MYCOSTATIN) 100,000 unit/mL suspension; Take 4 mLs (400,000 Units total) by mouth 4 (four) times daily. for 10 days  Dispense: 160 mL; Refill: 0        Patient Instructions   Patient Education       Thrush Discharge Instructions   About this topic   Thrush is a type of yeast infection that you can get in your mouth. Germs, called fungi, cause yeast infections. These germs live almost everywhere on your body. Most often, your immune system can control the amount of yeast and you stay healthy. If you are sick, the yeast can multiply and cause an infection.  Thrush causes white patches inside your mouth. You may  also have redness, bleeding, or soreness. In other parts of your body, yeast infections can cause itching, burning or cracking of the skin. People who are breastfeeding may develop thrush on their nipples. Babies who are  can also develop oral thrush.     What care is needed at home?   Ask your doctor what you need to do when you go home. Make sure you ask questions if you do not understand what the doctor says. This way you will know what you need to do.  For adults and children:  Brush your teeth and tongue at least two times each day with a soft toothbrush. Floss every night. Change your toothbrush as ordered.  If you have dentures or retainers, clean them well every night.  Do not use over-the-counter (OTC) mouthwashes. They can kill healthy bacteria, which you may need to help recover.  Rinse your mouth with warm salt water a few times a day. Mix 1/2 teaspoon (2.5 grams) salt with a cup (240 mL) of warm water.  For infants and toddlers:  Wash your baby's bottle, bottle nipples, and pacifiers each day in hot water.  Wash sippy cups and toys that children put in the mouth each day in hot water.  For breastfeeding moms:  Wash bras, bra pads, nightgowns, or any items that come into contact with your nipples in hot water and dry in a hot dryer.  Rinse nipples after each breastfeeding.  Clean all parts of the breast pump which come into contact with the milk.  What follow-up care is needed?   Your doctor may ask you to make visits to the office to check on your progress. Be sure to keep these visits.  What drugs may be needed?   The doctor may order drugs to fight an infection. The drug may be a pill, mouthwash, or lozenge that you suck on.  Will physical activity be limited?   Physical activity will not be limited.  What changes to diet are needed?   Ask your doctor if eating yogurt may help.  If your mouth is sore, eat soft foods like soup and pureed fruits and vegetables.  What problems could happen?    Thrush may spread to other parts of the body  Treatment does not work  Thrush comes back  What can be done to prevent this health problem?   See your dentist regularly. You may need to go more often if you have diabetes or dentures.  If you have diabetes, keep your blood sugar under control.  If you use an inhaler, rinse your mouth after each use.  If you smoke, try to quit.  If you get thrush often, you may need to take drugs every day to keep from getting it again.  When do I need to call the doctor?   Fever of 100.4°F (38°C) or higher  Signs of not getting enough water. These include dry mouth, very thirsty, or little or no urine.  Trouble swallowing or stiff neck or jaw  You are not feeling better in 2 to 3 days or you are feeling worse  Teach Back: Helping You Understand   The Teach Back Method helps you understand the information we are giving you. After you talk with the staff, tell them in your own words what you learned. This helps to make sure the staff has described each thing clearly. It also helps to explain things that may have been confusing. Before going home, make sure you can do these:  I can tell you about my condition.  I can tell you how to care for my babys bottles, nipples, and pacifiers if my baby has thrush.  I can tell you what signs will make me think I am not getting enough water.  Where can I learn more?   [employer] Academy [employer]  https://familydoctor.org/condition/thrush/   [employer]ids[employer]  http://kidshealth.org/parent/infections/skin/thrush.html   Last Reviewed Date   2021-08-30  Consumer Information Use and Disclaimer   This information is not specific medical advice and does not replace information you receive from your health care provider. This is only a brief summary of general information. It does NOT include all information about conditions, illnesses, injuries, tests, procedures, treatments, therapies, discharge instructions or life-style choices that may apply to  you. You must talk with your health care provider for complete information about your health and treatment options. This information should not be used to decide whether or not to accept your health care providers advice, instructions or recommendations. Only your health care provider has the knowledge and training to provide advice that is right for you.  Copyright   Copyright © 2021 [employer] and its affiliates and/or licensors. All rights reserved.

## 2024-08-22 NOTE — TELEPHONE ENCOUNTER
Patient reports sore throat and white patches on her tongue. Patient has done a virtual visit and was seen at another facility yesterday for same complaint. Patient would like to be seen in clinic. Patient reports worsening of symptoms. Unable to tolerate food and drink due to pain. Dispo provided- go to ED/UC now (or office with pcp approval) No visits in clinic available today, advised ED/UC. Patient unhappy with disposition. Instructed to call back with additional questions or worsening of symptoms. Patient verbalized understanding.      Reason for Disposition   Drinking very little and has signs of dehydration (e.g., no urine > 12 hours, very dry mouth, very lightheaded)    Additional Information   Negative: SEVERE difficulty breathing (e.g., struggling for each breath, speaks in single words)   Negative: Sounds like a life-threatening emergency to the triager   Negative: Drooling or spitting out saliva (because can't swallow)   Negative: Unable to open mouth completely    Protocols used: Sore Throat-A-OH

## 2024-08-22 NOTE — TELEPHONE ENCOUNTER
"Called Pt to see what was going on due to the message we received ; Pt stated "Reveived Omnipod  now it needs to be programed". Pt is specifically wants a call from Education tomorrow morning.  "

## 2024-08-22 NOTE — TELEPHONE ENCOUNTER
----- Message from Trever Fuentes sent at 8/22/2024  4:03 PM CDT -----  Patient called in she received her omni pod and need her  reset. She would like for the nurse to give her a call back pertaining this matter . Call Back is [phone]

## 2024-08-23 ENCOUNTER — TELEPHONE (OUTPATIENT)
Dept: DIABETES | Facility: CLINIC | Age: 54
End: 2024-08-23
Payer: MEDICAID

## 2024-08-23 DIAGNOSIS — K58.2 IRRITABLE BOWEL SYNDROME WITH BOTH CONSTIPATION AND DIARRHEA: ICD-10-CM

## 2024-08-23 RX ORDER — POLYETHYLENE GLYCOL 3350 17 G/17G
17 POWDER, FOR SOLUTION ORAL DAILY
Qty: 1530 G | Refills: 3 | Status: SHIPPED | OUTPATIENT
Start: 2024-08-23

## 2024-08-23 NOTE — TELEPHONE ENCOUNTER
Returned pt's call. States she found her old . Directed her to put new pod on. Successful call.     ----- Message from Mohini Jamil RD, CDE sent at 8/23/2024  7:44 AM CDT -----  Contact: Yolanda    ----- Message -----  From: Jose Miguel Remy  Sent: 8/23/2024   7:09 AM CDT  To: Reshma Guerrero would like a call back at [phone] in regards to needing to speak with nurse Yolanda Lee about her Omnipods.  Thanks   am

## 2024-08-27 ENCOUNTER — TELEPHONE (OUTPATIENT)
Dept: PSYCHIATRY | Facility: CLINIC | Age: 54
End: 2024-08-27
Payer: MEDICAID

## 2024-08-27 NOTE — TELEPHONE ENCOUNTER
----- Message from KARENMarielle Presley sent at 8/27/2024  3:28 PM CDT -----  Contact: Self [phone]  Would like to receive medical advice.    Would they like a call back or a response via [facility]:  call back    Additional information:  Calling to speak with the office about her memory loss and weight gain. She would also like to discuss medication adjustment.

## 2024-08-27 NOTE — TELEPHONE ENCOUNTER
Ret call to pt and lvm for her to call back if she likes or to send a message to Dr Young through [facility] explaining in a bit more detail exactly what the issues, symptoms, and concerns are.  I also reminded her that it is time to book her 1mo f/u with Dr Young.

## 2024-08-28 ENCOUNTER — TELEPHONE (OUTPATIENT)
Dept: PSYCHIATRY | Facility: CLINIC | Age: 54
End: 2024-08-28
Payer: MEDICAID

## 2024-08-28 NOTE — TELEPHONE ENCOUNTER
Nurse spoke with patient as she stated she is experience memory loss from the medication and it is very noticeable. Nurse asked her which medication. She stated she did not know. Nurse asked when he start and has any noticed it. She did not answer regarding when it started but stated she noticed it. Patient made an appointment virtually on 8/30/24        ----- Message from Rolando Kent sent at 8/28/2024  3:29 PM CDT -----  Contact: marj Guerrero is requesting call back in regards to side affects with the medications she is taking. Please give her a call back at [phone]

## 2024-08-30 ENCOUNTER — OFFICE VISIT (OUTPATIENT)
Dept: PSYCHIATRY | Facility: CLINIC | Age: 54
End: 2024-08-30
Payer: MEDICAID

## 2024-08-30 DIAGNOSIS — F25.0 SCHIZOAFFECTIVE DISORDER, BIPOLAR TYPE: Primary | Chronic | ICD-10-CM

## 2024-08-30 DIAGNOSIS — R41.89 SUBJECTIVE MEMORY COMPLAINTS: ICD-10-CM

## 2024-08-30 DIAGNOSIS — F41.1 GENERALIZED ANXIETY DISORDER: Chronic | ICD-10-CM

## 2024-08-30 NOTE — PROGRESS NOTES
"Outpatient Psychiatry Follow-Up Visit    8/30/2024      Virtual Visit    The patient location is: Patient's home/ Patient reported that his/her location at the time of this visit was in the [facility]     Visit type: Virtual visit with synchronous audio and video     Each patient to whom he or she provides medical services by telehealth is: (1) informed of the relationship between the medical psychologist and patient and the respective role of any other health care provider with respect to management of the patient; and (2) notified that he or she may decline to receive medical services by telehealth and may withdraw from such care at any time.    I also informed patient of the following:   Mariam Young, PhD, MPAP:  [place] medical license number: [id number]    My contact info:  [facility] / 2nd Floor  [address] [place] [address]   Ph: [phone]    If technology issues, call office phone: Ph: [phone] or [phone]  If crisis: Dial 911 or go to nearest Emergency Room (ER)  If questions related to privacy practices: contact [facility] Information Department: [phone]    Chief Complaint:  Yolanda Betts is a 54 y.o. female who presents today for follow-up of mood and anxiety.       LAST VISIT: Yolanda attended her visit. She has a little more energy--"still fighting through the depression and anxiety and panic attacks." She is working on "positive measures" and is praying. She has gained a lot of weight--wonders if Remeron. We discussed options--she does not want to try trazodone again because she had bad dreams and was aggressive in her sleep; doxepin was not effective (made her heart race--"I felt like I was going to die."). She wants to try Ambien again--we discussed her Xanax usage. She has been taking Xanax in the mornings and again between 3 and 6. Her bedtime is between 9 and 10 pm.     Her sister who passed 3 years ago had 5 kids; " "the two younger ones need caregivers. Her ex- who has custody is in [facility]. She has been helping her other sister care for the kids. She recently put her 72-y/o uncle in a nursing home. Yolanda's daughter drove her a month ago to get him--his roommate put him out, and the room was unclean. He has Crohn's and was having accidents. Yolanda had to call an ambulance to get him because she found blood in his stool. He was in the hospital for a month an then went to a nursing home. He needs 24 hour care. She is at peace with it now--but "it was very stressful." Her a/c has not been keeping up with the sun/heat. There are multiple stressors.    We agreed to try Ambien again. She does like Caplyta--she feels a little better.    Plan--continue Lamictal 200 mg bid; Xanax 1 mg bid; Caplyta 10.5 mg; takes Cymbalta 60 mg bid from another provider; decrease Remeron 15 mg hs for 4-6 nights, while starting Ambien 5 mg hs; then stop Remeron; consider IOP    CURRENT PRESENTATION: Yolanda attended her virtual visit, though we had initial technical problems. She reported having worsening memory problems. She said that she is not remembering things--she forgets what she is doing in the middle of things. She said that it's happening more often.    She has an upcoming nuclear stress test--has had higher anxiety. She does think that she is sleeping better at night and is not sleeping as much during the day. Though she is not sleeping, she does not feel like she has a lot of energy. In addition to cardiology, she has upcoming appt with pulmonology because she has been having trouble with her lungs/breathing. She said that she is also "shaking really bad. My hands are trembling; my mouth and my lips." She described it as "trembling" (not observed this visit); her hands are reportedly constantly moving "like I'm having little tremors or something." It started about 6 months ago but "it's just gotten really really noticeable."     She is "not " "telling people off. I'm trying to breathe and say what I have to say and not get offended by everything they say." Her interactions have been better. She was noted to laugh during this visit--have not heard her laugh in months.  She does feel better over all emotionally, though as would be expected, her anxiety is higher because of the upcoming medical tests.  We agreed to leave her medicines as currently prescribed and put in a referral for a neuropsych consult for her memory.  We will schedule her next visit with me for in-person so that we can further review the trembling that she reported during this visit.    Plan--continue Lamictal 200 mg bid; Xanax 1 mg bid; Caplyta 10.5 mg; Ambien 5 mg hs; takes Cymbalta 60 mg bid from another provider; referral to neuropsych for consult on memory     since July 2024.          Therapist: Feroz Stanley LCSW with [facility]   Ph: [phone]  Fax: [phone]     Prior medicines: Prozac, Wellbutrin, Paxil, Lamictal, Lexapro, Celexa, Cymbalta, Remeron, Abilify, Seroquel (raised blood sugars), Risperdal, Latuda, Vraylar, Restoril, Ambien (groggy), Ambien CR, trazodone, doxepin, Xanax, clonazepam  ________________________________________________________________          8/30/2024     9:55 AM 6/12/2024     9:45 AM 6/3/2024     8:12 AM   GAD7   1. Feeling nervous, anxious, or on edge? 3 3 3   2. Not being able to stop or control worrying? 3 3 3   3. Worrying too much about different things? 3 3 3   4. Trouble relaxing? 3 3 3   5. Being so restless that it is hard to sit still? 3 3 3   6. Becoming easily annoyed or irritable? 3 3 3   7. Feeling afraid as if something awful might happen? 3 3 3   8. If you checked off any problems, how difficult have these problems made it for you to do your work, take care of things at home, or get along with other people? 3     JOANN-7 Score 21 21 21      0-4 = Minimal anxiety  5-9 = Mild anxiety  10-14 = Moderate " "anxiety  15-21 = Severe anxiety       Review of Systems   PSYCHIATRIC: Pertinant items are noted in the narrative.    Past Medical, Family and Social History: The patient's past medical, family and social history have been reviewed and updated as appropriate within the electronic medical record - see encounter notes.      Current Outpatient Medications:     albuterol (PROVENTIL) 2.5 mg /3 mL (0.083 %) nebulizer solution, Take 2.5 mg by nebulization every 4 (four) hours as needed. Times a day, Disp: 75 mL, Rfl: 1    albuterol (PROVENTIL/VENTOLIN HFA) 90 mcg/actuation inhaler, Inhale 2 puffs into the lungs every 6 (six) hours as needed for Wheezing., Disp: 8.5 g, Rfl: 1    ALPRAZolam (XANAX) 1 MG tablet, Take 1 tablet (1 mg total) by mouth 2 (two) times daily as needed for Anxiety., Disp: 60 tablet, Rfl: 2    amlodipine-valsartan (EXFORGE)  mg per tablet, Take 1 tablet by mouth once daily., Disp: 90 tablet, Rfl: 3    amlodipine-valsartan (EXFORGE)  mg per tablet, Take 1 tablet by mouth in the morning., Disp: 90 tablet, Rfl: 1    amlodipine-valsartan (EXFORGE)  mg per tablet, Take 1 tablet by mouth every morning, Disp: 90 tablet, Rfl: 1    atorvastatin (LIPITOR) 20 MG tablet, Take 1 tablet (20 mg total) by mouth every evening, Disp: 90 tablet, Rfl: 3    azelastine (ASTELIN) 137 mcg (0.1 %) nasal spray, 2 sprays (274 mcg total) by Nasal route 2 (two) times daily., Disp: 30 mL, Rfl: 0    BD INSULIN SYRINGE ULTRA-FINE 1/2 mL 30 gauge x 1/2" Syrg, , Disp: , Rfl: 0    blood-glucose meter,continuous (DEXCOM G7 ) Misc, 1 each by Misc.(Non-Drug; Combo Route) route once. for 1 dose, Disp: 1 each, Rfl: 0    blood-glucose sensor (DEXCOM G6 SENSOR) Mariela, change sensor every 10 days., Disp: 3 each, Rfl: 11    blood-glucose sensor (DEXCOM G7 SENSOR) Mariela, Use to check bg. Change every 10 days, Disp: 3 each, Rfl: 11    blood-glucose transmitter (DEXCOM G6 TRANSMITTER) Mariela, 1 each by Misc.(Non-Drug; Combo " "Route) route every 3 (three) months., Disp: 1 each, Rfl: 3    blood-glucose transmitter (DEXCOM G6 TRANSMITTER) Mariela, Use as directed change every 3 months, Disp: 1 each, Rfl: 3    celecoxib (CELEBREX) 100 MG capsule, Take 1 capsule (100 mg total) by mouth 2 (two) times daily., Disp: 60 capsule, Rfl: 2    cyclobenzaprine (FLEXERIL) 10 MG tablet, Take 1 tablet (10 mg total) by mouth 3 (three) times daily as needed (Pain)., Disp: 90 tablet, Rfl: 11    DULoxetine (CYMBALTA) 60 MG capsule, Take 1 capsule by mouth 2 times daily, Disp: 180 capsule, Rfl: 3    ergocalciferol (VITAMIN D2) 50,000 unit Cap, Take 1 capsule twice weekly for 3 weeks then weekly, Disp: 12 capsule, Rfl: 3    estradioL (ESTRACE) 0.01 % (0.1 mg/gram) vaginal cream, Place 1 g vaginally twice a week., Disp: 42.5 g, Rfl: 3    fenofibrate (TRICOR) 145 MG tablet, Take 1 tablet (145 mg total) by mouth once daily., Disp: 90 tablet, Rfl: 3    frovatriptan (FROVA) 2.5 MG tablet, Take 2.5 mg by mouth daily as needed., Disp: , Rfl:     furosemide (LASIX) 20 MG tablet, Take 1 tablet (20 mg total) by mouth once daily., Disp: 90 tablet, Rfl: 3    glucagon (BAQSIMI) 3 mg/actuation Spry, 1 spray by Nasal route as needed (hypoglycemia). For emergency use. May repeat 1 time after 15 minutes (Patient not taking: Reported on 8/8/2024), Disp: 2 each, Rfl: 1    glucagon (BAQSIMI) 3 mg/actuation Spry, SPRAY 1 SPRAY IN NOSTRIL AS NEEDED FOR EMERGENCY. MAY REPEAT 1 TIME AFTER 15 MINUTES, Disp: 2 each, Rfl: 1    insulin aspart U-100 (NOVOLOG U-100 INSULIN ASPART) 100 unit/mL injection, To use via insulin pump. Up to 200 units every 3 days, Disp: 20 mL, Rfl: 5    insulin aspart U-100 (NOVOLOG) 100 unit/mL injection, NovoLOG FlexPen, Disp: , Rfl:     insulin pump cart,automated,BT (OMNIPOD 5 G6 PODS, GEN 5,) Crtg, Inject into skin every 3 days as directed, Disp: 30 each, Rfl: 3    insulin syringe-needle U-100 0.3 mL 30 gauge x 5/16" Syrg, 1 each by Misc.(Non-Drug; Combo Route) " "route Every 3 (three) days., Disp: 100 each, Rfl: 2    lamoTRIgine (LAMICTAL) 200 MG tablet, Take 1 tablet (200 mg total) by mouth 2 (two) times daily., Disp: 60 tablet, Rfl: 2    levocetirizine (XYZAL) 5 MG tablet, Take 1 tablet (5 mg total) by mouth every evening., Disp: 30 tablet, Rfl: 11    LIDOcaine (LIDODERM) 5 %, Place 2 patches onto the skin every 12 (twelve) hours as needed (pain). Remove & Discard patch within 12 hours or as directed by MD, Disp: 60 patch, Rfl: 11    lifitegrast (XIIDRA) 5 % Dpet, Place 1 drop into both eyes 2 (two) times daily., Disp: 60 each, Rfl: 12    lumateperone (CAPLYTA) 10.5 mg Cap, Take 1 capsule (10.5 mg total) by mouth once daily., Disp: 30 capsule, Rfl: 2    magic mouthwash diphen/antac/lidoc/nysta, Swish and spit 5 mLs every 4 (four) hours as needed., Disp: 118 mL, Rfl: 0    magnesium oxide (MAG-OX) 400 mg (241.3 mg magnesium) tablet, Take 1 tablet (400 mg total) by mouth once daily., Disp: 90 tablet, Rfl: 0    methylPREDNISolone (MEDROL DOSEPACK) 4 mg tablet, use as directed, Disp: 21 tablet, Rfl: 1    metoprolol succinate (TOPROL-XL) 50 MG 24 hr tablet, Take 1 tablet (50 mg total) by mouth every morning., Disp: 30 tablet, Rfl: 11    nebulizer and compressor (COMP-AIR NEBULIZER COMPRESSOR) Mariela, use as directed, Disp: 1 each, Rfl: 0    nystatin (MYCOSTATIN) 100,000 unit/mL suspension, Take 4 mLs (400,000 Units total) by mouth 4 (four) times daily. for 10 days, Disp: 160 mL, Rfl: 0    omeprazole (PRILOSEC) 40 MG capsule, Take 1 capsule (40 mg total) by mouth once daily., Disp: 90 capsule, Rfl: 1    OZEMPIC 2 mg/dose (8 mg/3 mL) PnIj, Inject 2 mg into the skin every 7 days., Disp: , Rfl:     pen needle, diabetic (BD ULTRA-FINE MINI PEN NEEDLE) 31 gauge x 3/16" Ndle, Use one needle 5 times a day, Disp: 200 each, Rfl: 11    pen needle, diabetic (BD ULTRA-FINE MINI PEN NEEDLE) 31 gauge x 3/16" Ndle, Use 5 a day, Disp: 200 each, Rfl: 11    pen needle, diabetic (BD ULTRA-FINE MINI " "PEN NEEDLE) 31 gauge x 3/16" Ndle, USE TO INJECT INSULIN 5 TIMES DAILY, Disp: 200 each, Rfl: 10    polyethylene glycol (GLYCOLAX) 17 gram/dose powder, Dissolve 1 capful (17g total) in liquid and take by mouth once daily., Disp: 1530 g, Rfl: 3    promethazine (PHENERGAN) 25 MG tablet, Take 25 mg by mouth every 6 (six) hours as needed., Disp: , Rfl:     secukinumab (COSENTYX PEN, 2 PENS,) 150 mg/mL PnIj, Inject 300 mg into the skin every 14 (fourteen) days., Disp: 4 mL, Rfl: 3    semaglutide (OZEMPIC) 2 mg/dose (8 mg/3 mL) PnIj, Inject 2 mg into the skin every 7 days., Disp: 3 mL, Rfl: 2    SYMBICORT 160-4.5 mcg/actuation HFAA, Inhale 2 puffs into the lungs in the morning and 2 puffs before bedtime. 2 puffs every 12 hours, Disp: 10.2 g, Rfl: 2    zolpidem (AMBIEN) 5 MG Tab, Take 1 tablet (5 mg total) by mouth nightly as needed (anxiety)., Disp: 30 tablet, Rfl: 2  No current facility-administered medications for this visit.    Facility-Administered Medications Ordered in Other Visits:     ondansetron injection 4 mg, 4 mg, Intravenous, Once PRN, Otto Phillips MD    Compliance: yes    Side effects: see above    Risk Parameters:  Patient reports no suicidal ideation  Patient reports no homicidal ideation  Patient reports no self-injurious behavior  Patient reports no violent behavior    Exam (detailed: at least 9 elements; comprehensive: all 15 elements)   Constitutional    Wt Readings from Last 10 Encounters:   08/07/24 88.9 kg (195 lb 14.1 oz)   07/30/24 87.9 kg (193 lb 12.6 oz)   07/15/24 86.5 kg (190 lb 12.9 oz)   07/11/24 85.7 kg (188 lb 15 oz)   05/23/24 78.5 kg (173 lb 1 oz)   03/22/24 78.5 kg (173 lb)   03/18/24 79.1 kg (174 lb 6.1 oz)   02/21/24 81.5 kg (179 lb 10.8 oz)   02/14/24 82.2 kg (181 lb 3.2 oz)   02/06/24 83.2 kg (183 lb 6.8 oz)     Vitals:  Most recent vital signs were reviewed.   Last 3 sets of Vitals        7/30/2024    11:38 AM 8/7/2024     8:35 AM 8/8/2024     1:12 PM   Vitals - 1 value per " "visit   SYSTOLIC 131 120 154   DIASTOLIC 82 80 96   Pulse 89 85 90   Resp  16    SPO2  99 % 99 %   Weight (lb) 193.78 195.88    Weight (kg) 87.9 88.85    Height  4' 8" (1.422 m) 4' 8" (1.422 m)   BMI (Calculated)  43.9    Pain Score   Eight          General:  age appropriate, casually dressed, neatly groomed, hair down     Musculoskeletal  Muscle Strength/Tone:  no tremor, no tic, none observed; pt reported   Gait & Station:  video visit     Psychiatric  Speech:  no latency; no press, soft   Behavior: wnl   Mood & Affect:  anxious, but better  blunted   Thought Process:  normal and logical   Associations:  intact   Thought Content:  normal, no suicidality, no homicidality, delusions, or paranoia   Insight:  has awareness of illness   Judgement: behavior is adequate to circumstances   Orientation:  grossly intact   Memory: intact for content of interview   Language: grossly intact   Attention Span & Concentration:  Grossly intact   Fund of Knowledge:  intact and appropriate to age and level of education     Assessment and Diagnosis   Status/Progress: Based on the examination today, the patient's problem(s) is/are improved and adequately but not ideally controlled.  New problems have been presented today.   Co-morbidities and psychosocial stressors  are complicating management of the primary condition.  The working differential for this patient includes schizoaffective vs bipolar vs schizophrenia.     General Impression:     Encounter Diagnoses   Name Primary?    Schizoaffective disorder, bipolar type Yes    Generalized anxiety disorder     Subjective memory complaints        Intervention/Counseling/Treatment Plan   Medication Management: Discussed risks, benefits, and alternatives to treatment plan documented above with patient. I answered all patient questions related to this plan, and patient expressed understanding and agreement.   continue Lamictal 200 mg bid; Xanax 1 mg bid; Caplyta 10.5 mg; Ambien 5 mg hs; " "takes Cymbalta 60 mg bid from another provider  Continue with therapy    referral to neuropsych for consult on memory       Medication List with Changes/Refills   Current Medications    ALBUTEROL (PROVENTIL) 2.5 MG /3 ML (0.083 %) NEBULIZER SOLUTION    Take 2.5 mg by nebulization every 4 (four) hours as needed. Times a day    ALBUTEROL (PROVENTIL/VENTOLIN HFA) 90 MCG/ACTUATION INHALER    Inhale 2 puffs into the lungs every 6 (six) hours as needed for Wheezing.    ALPRAZOLAM (XANAX) 1 MG TABLET    Take 1 tablet (1 mg total) by mouth 2 (two) times daily as needed for Anxiety.    AMLODIPINE-VALSARTAN (EXFORGE)  MG PER TABLET    Take 1 tablet by mouth once daily.    AMLODIPINE-VALSARTAN (EXFORGE)  MG PER TABLET    Take 1 tablet by mouth in the morning.    AMLODIPINE-VALSARTAN (EXFORGE)  MG PER TABLET    Take 1 tablet by mouth every morning    ATORVASTATIN (LIPITOR) 20 MG TABLET    Take 1 tablet (20 mg total) by mouth every evening    AZELASTINE (ASTELIN) 137 MCG (0.1 %) NASAL SPRAY    2 sprays (274 mcg total) by Nasal route 2 (two) times daily.    BD INSULIN SYRINGE ULTRA-FINE 1/2 ML 30 GAUGE X 1/2" SYRG        BLOOD-GLUCOSE METER,CONTINUOUS (DEXCOM G7 ) MISC    1 each by Misc.(Non-Drug; Combo Route) route once. for 1 dose    BLOOD-GLUCOSE SENSOR (DEXCOM G6 SENSOR) NHI    change sensor every 10 days.    BLOOD-GLUCOSE SENSOR (DEXCOM G7 SENSOR) NHI    Use to check bg. Change every 10 days    BLOOD-GLUCOSE TRANSMITTER (DEXCOM G6 TRANSMITTER) NHI    1 each by Misc.(Non-Drug; Combo Route) route every 3 (three) months.    BLOOD-GLUCOSE TRANSMITTER (DEXCOM G6 TRANSMITTER) NHI    Use as directed change every 3 months    CELECOXIB (CELEBREX) 100 MG CAPSULE    Take 1 capsule (100 mg total) by mouth 2 (two) times daily.    CYCLOBENZAPRINE (FLEXERIL) 10 MG TABLET    Take 1 tablet (10 mg total) by mouth 3 (three) times daily as needed (Pain).    DULOXETINE (CYMBALTA) 60 MG CAPSULE    Take 1 capsule by " "mouth 2 times daily    ERGOCALCIFEROL (VITAMIN D2) 50,000 UNIT CAP    Take 1 capsule twice weekly for 3 weeks then weekly    ESTRADIOL (ESTRACE) 0.01 % (0.1 MG/GRAM) VAGINAL CREAM    Place 1 g vaginally twice a week.    FENOFIBRATE (TRICOR) 145 MG TABLET    Take 1 tablet (145 mg total) by mouth once daily.    FROVATRIPTAN (FROVA) 2.5 MG TABLET    Take 2.5 mg by mouth daily as needed.    FUROSEMIDE (LASIX) 20 MG TABLET    Take 1 tablet (20 mg total) by mouth once daily.    GLUCAGON (BAQSIMI) 3 MG/ACTUATION SPRY    1 spray by Nasal route as needed (hypoglycemia). For emergency use. May repeat 1 time after 15 minutes    GLUCAGON (BAQSIMI) 3 MG/ACTUATION SPRY    SPRAY 1 SPRAY IN NOSTRIL AS NEEDED FOR EMERGENCY. MAY REPEAT 1 TIME AFTER 15 MINUTES    INSULIN ASPART U-100 (NOVOLOG U-100 INSULIN ASPART) 100 UNIT/ML INJECTION    To use via insulin pump. Up to 200 units every 3 days    INSULIN ASPART U-100 (NOVOLOG) 100 UNIT/ML INJECTION    NovoLOG FlexPen    INSULIN PUMP CART,AUTOMATED,BT (OMNIPOD 5 G6 PODS, GEN 5,) CRTG    Inject into skin every 3 days as directed    INSULIN SYRINGE-NEEDLE U-100 0.3 ML 30 GAUGE X 5/16" SYRG    1 each by Misc.(Non-Drug; Combo Route) route Every 3 (three) days.    LAMOTRIGINE (LAMICTAL) 200 MG TABLET    Take 1 tablet (200 mg total) by mouth 2 (two) times daily.    LEVOCETIRIZINE (XYZAL) 5 MG TABLET    Take 1 tablet (5 mg total) by mouth every evening.    LIDOCAINE (LIDODERM) 5 %    Place 2 patches onto the skin every 12 (twelve) hours as needed (pain). Remove & Discard patch within 12 hours or as directed by MD    LIFITEGRAST (XIIDRA) 5 % DPET    Place 1 drop into both eyes 2 (two) times daily.    LUMATEPERONE (CAPLYTA) 10.5 MG CAP    Take 1 capsule (10.5 mg total) by mouth once daily.    MAGIC MOUTHWASH DIPHEN/ANTAC/LIDOC/NYSTA    Swish and spit 5 mLs every 4 (four) hours as needed.    MAGNESIUM OXIDE (MAG-OX) 400 MG (241.3 MG MAGNESIUM) TABLET    Take 1 tablet (400 mg total) by mouth once " "daily.    METHYLPREDNISOLONE (MEDROL DOSEPACK) 4 MG TABLET    use as directed    METOPROLOL SUCCINATE (TOPROL-XL) 50 MG 24 HR TABLET    Take 1 tablet (50 mg total) by mouth every morning.    NEBULIZER AND COMPRESSOR (COMP-AIR NEBULIZER COMPRESSOR) NHI    use as directed    NYSTATIN (MYCOSTATIN) 100,000 UNIT/ML SUSPENSION    Take 4 mLs (400,000 Units total) by mouth 4 (four) times daily. for 10 days    OMEPRAZOLE (PRILOSEC) 40 MG CAPSULE    Take 1 capsule (40 mg total) by mouth once daily.    OZEMPIC 2 MG/DOSE (8 MG/3 ML) PNIJ    Inject 2 mg into the skin every 7 days.    PEN NEEDLE, DIABETIC (BD ULTRA-FINE MINI PEN NEEDLE) 31 GAUGE X 3/16" NDLE    Use one needle 5 times a day    PEN NEEDLE, DIABETIC (BD ULTRA-FINE MINI PEN NEEDLE) 31 GAUGE X 3/16" NDLE    Use 5 a day    PEN NEEDLE, DIABETIC (BD ULTRA-FINE MINI PEN NEEDLE) 31 GAUGE X 3/16" NDLE    USE TO INJECT INSULIN 5 TIMES DAILY    POLYETHYLENE GLYCOL (GLYCOLAX) 17 GRAM/DOSE POWDER    Dissolve 1 capful (17g total) in liquid and take by mouth once daily.    PROMETHAZINE (PHENERGAN) 25 MG TABLET    Take 25 mg by mouth every 6 (six) hours as needed.    SECUKINUMAB (COSENTYX PEN, 2 PENS,) 150 MG/ML PNIJ    Inject 300 mg into the skin every 14 (fourteen) days.    SEMAGLUTIDE (OZEMPIC) 2 MG/DOSE (8 MG/3 ML) PNIJ    Inject 2 mg into the skin every 7 days.    SYMBICORT 160-4.5 MCG/ACTUATION HFAA    Inhale 2 puffs into the lungs in the morning and 2 puffs before bedtime. 2 puffs every 12 hours    ZOLPIDEM (AMBIEN) 5 MG TAB    Take 1 tablet (5 mg total) by mouth nightly as needed (anxiety).   Discontinued Medications    MIRTAZAPINE (REMERON) 30 MG TABLET    Take 1 tablet by mouth every evening.        Return to Clinic: 6 weeks, in clinic      Time spent with pt including note preparation: 26 minutes       Mariam Young, PhD, MP  Advanced Practice Medical Psychologist  [facility]--[address]  [place] [address]  [phone] "   [phone] fax

## 2024-08-30 NOTE — PATIENT INSTRUCTIONS

## 2024-09-01 ENCOUNTER — HOSPITAL ENCOUNTER (OUTPATIENT)
Dept: SLEEP MEDICINE | Facility: HOSPITAL | Age: 54
Discharge: HOME | End: 2024-09-01
Attending: FAMILY MEDICINE
Payer: MEDICAID

## 2024-09-03 ENCOUNTER — HOSPITAL ENCOUNTER (OUTPATIENT)
Dept: RADIOLOGY | Facility: HOSPITAL | Age: 54
Discharge: HOME | End: 2024-09-03
Attending: INTERNAL MEDICINE
Payer: MEDICAID

## 2024-09-03 ENCOUNTER — HOSPITAL ENCOUNTER (OUTPATIENT)
Dept: CARDIOLOGY | Facility: HOSPITAL | Age: 54
Discharge: HOME | End: 2024-09-03
Attending: INTERNAL MEDICINE
Payer: MEDICAID

## 2024-09-03 VITALS
DIASTOLIC BLOOD PRESSURE: 96 MMHG | BODY MASS INDEX: 43.87 KG/M2 | SYSTOLIC BLOOD PRESSURE: 154 MMHG | HEIGHT: 56 IN | WEIGHT: 195 LBS

## 2024-09-03 DIAGNOSIS — K58.2 IRRITABLE BOWEL SYNDROME WITH BOTH CONSTIPATION AND DIARRHEA: ICD-10-CM

## 2024-09-03 DIAGNOSIS — R07.89 OTHER CHEST PAIN: ICD-10-CM

## 2024-09-03 DIAGNOSIS — R06.09 DOE (DYSPNEA ON EXERTION): ICD-10-CM

## 2024-09-03 LAB
AORTIC ROOT ANNULUS: 2.56 CM
ASCENDING AORTA: 2.68 CM
AV INDEX (PROSTH): 0.74
AV MEAN GRADIENT: 3 MMHG
AV PEAK GRADIENT: 6 MMHG
AV VALVE AREA BY VELOCITY RATIO: 2.49 CM²
AV VALVE AREA: 2.46 CM²
AV VELOCITY RATIO: 0.75
BSA FOR ECHO PROCEDURE: 1.87 M2
CV ECHO LV RWT: 0.49 CM
CV STRESS BASE HR: 83 BPM
DIASTOLIC BLOOD PRESSURE: 79 MMHG
DOP CALC AO PEAK VEL: 1.23 M/S
DOP CALC AO VTI: 26.9 CM
DOP CALC LVOT AREA: 3.3 CM2
DOP CALC LVOT DIAMETER: 2.06 CM
DOP CALC LVOT PEAK VEL: 0.92 M/S
DOP CALC LVOT STROKE VOLUME: 66.29 CM3
DOP CALC RVOT PEAK VEL: 0.7 M/S
DOP CALC RVOT VTI: 14.8 CM
DOP CALCLVOT PEAK VEL VTI: 19.9 CM
E WAVE DECELERATION TIME: 140.01 MSEC
E/A RATIO: 1.33
E/E' RATIO: 10.27 M/S
ECHO LV POSTERIOR WALL: 1.14 CM (ref 0.6–1.1)
FRACTIONAL SHORTENING: 30 % (ref 28–44)
INTERVENTRICULAR SEPTUM: 0.91 CM (ref 0.6–1.1)
IVC DIAMETER: 1.42 CM
LA MAJOR: 5.77 CM
LA MINOR: 5.48 CM
LA WIDTH: 4.4 CM
LEFT ATRIUM AREA SYSTOLIC (APICAL 2 CHAMBER): 21.17 CM2
LEFT ATRIUM AREA SYSTOLIC (APICAL 4 CHAMBER): 22.14 CM2
LEFT ATRIUM SIZE: 3.81 CM
LEFT ATRIUM VOLUME INDEX MOD: 38.8 ML/M2
LEFT ATRIUM VOLUME INDEX: 45.5 ML/M2
LEFT ATRIUM VOLUME MOD: 68.35 CM3
LEFT ATRIUM VOLUME: 80.1 CM3
LEFT INTERNAL DIMENSION IN SYSTOLE: 3.29 CM (ref 2.1–4)
LEFT VENTRICLE DIASTOLIC VOLUME INDEX: 57.82 ML/M2
LEFT VENTRICLE DIASTOLIC VOLUME: 101.76 ML
LEFT VENTRICLE END SYSTOLIC VOLUME APICAL 2 CHAMBER: 66.81 ML
LEFT VENTRICLE END SYSTOLIC VOLUME APICAL 4 CHAMBER: 65.8 ML
LEFT VENTRICLE MASS INDEX: 96 G/M2
LEFT VENTRICLE SYSTOLIC VOLUME INDEX: 24.8 ML/M2
LEFT VENTRICLE SYSTOLIC VOLUME: 43.72 ML
LEFT VENTRICULAR INTERNAL DIMENSION IN DIASTOLE: 4.69 CM (ref 3.5–6)
LEFT VENTRICULAR MASS: 169.51 G
LV LATERAL E/E' RATIO: 8.07 M/S
LV SEPTAL E/E' RATIO: 14.13 M/S
LVED V (TEICH): 101.76 ML
LVES V (TEICH): 43.72 ML
LVOT MG: 1.8 MMHG
LVOT MV: 0.63 CM/S
MV PEAK A VEL: 0.85 M/S
MV PEAK E VEL: 1.13 M/S
MV STENOSIS PRESSURE HALF TIME: 40.6 MS
MV VALVE AREA P 1/2 METHOD: 5.42 CM2
NUC REST EJECTION FRACTION: 64
NUC STRESS EJECTION FRACTION: 70 %
OHS CV CPX 85 PERCENT MAX PREDICTED HEART RATE MALE: 143
OHS CV CPX MAX PREDICTED HEART RATE: 168
OHS CV CPX PATIENT IS FEMALE: 1
OHS CV CPX PATIENT IS MALE: 0
OHS CV CPX PEAK DIASTOLIC BLOOD PRESSURE: 62 MMHG
OHS CV CPX PEAK HEAR RATE: 101 BPM
OHS CV CPX PEAK RATE PRESSURE PRODUCT: NORMAL
OHS CV CPX PEAK SYSTOLIC BLOOD PRESSURE: 121 MMHG
OHS CV CPX PERCENT MAX PREDICTED HEART RATE ACHIEVED: 63
OHS CV CPX RATE PRESSURE PRODUCT PRESENTING: NORMAL
OHS CV RV/LV RATIO: 0.71 CM
PISA MRMAX VEL: 5.93 M/S
PISA TR MAX VEL: 2.63 M/S
PULM VEIN S/D RATIO: 1.37
PV MEAN GRADIENT: 1 MMHG
PV MV: 0.6 M/S
PV PEAK D VEL: 0.38 M/S
PV PEAK GRADIENT: 3 MMHG
PV PEAK S VEL: 0.52 M/S
PV PEAK VELOCITY: 0.89 M/S
RA MAJOR: 4.46 CM
RA PRESSURE ESTIMATED: 3 MMHG
RA WIDTH: 2.42 CM
RIGHT VENTRICULAR END-DIASTOLIC DIMENSION: 3.33 CM
RV TB RVSP: 6 MMHG
RV TISSUE DOPPLER FREE WALL SYSTOLIC VELOCITY 1 (APICAL 4 CHAMBER VIEW): 11.74 CM/S
SINUS: 2.75 CM
STJ: 2.44 CM
SYSTOLIC BLOOD PRESSURE: 121 MMHG
TDI LATERAL: 0.14 M/S
TDI SEPTAL: 0.08 M/S
TDI: 0.11 M/S
TR MAX PG: 28 MMHG
TRICUSPID ANNULAR PLANE SYSTOLIC EXCURSION: 2.01 CM
TV REST PULMONARY ARTERY PRESSURE: 31 MMHG
Z-SCORE OF LEFT VENTRICULAR DIMENSION IN END DIASTOLE: -0.37
Z-SCORE OF LEFT VENTRICULAR DIMENSION IN END SYSTOLE: 0.7

## 2024-09-03 PROCEDURE — 93306 TTE W/DOPPLER COMPLETE: CPT

## 2024-09-03 PROCEDURE — 93018 CV STRESS TEST I&R ONLY: CPT | Mod: ,,, | Performed by: INTERNAL MEDICINE

## 2024-09-03 PROCEDURE — 78452 HT MUSCLE IMAGE SPECT MULT: CPT

## 2024-09-03 PROCEDURE — 93016 CV STRESS TEST SUPVJ ONLY: CPT | Mod: ,,, | Performed by: INTERNAL MEDICINE

## 2024-09-03 PROCEDURE — 93306 TTE W/DOPPLER COMPLETE: CPT | Mod: 26,,, | Performed by: INTERNAL MEDICINE

## 2024-09-03 PROCEDURE — A9502 TC99M TETROFOSMIN: HCPCS | Performed by: INTERNAL MEDICINE

## 2024-09-03 PROCEDURE — 63600175 PHARM REV CODE 636 W HCPCS: Performed by: INTERNAL MEDICINE

## 2024-09-03 PROCEDURE — 93017 CV STRESS TEST TRACING ONLY: CPT

## 2024-09-03 PROCEDURE — 78452 HT MUSCLE IMAGE SPECT MULT: CPT | Mod: 26,,, | Performed by: INTERNAL MEDICINE

## 2024-09-03 RX ORDER — REGADENOSON 0.08 MG/ML
0.4 INJECTION, SOLUTION INTRAVENOUS ONCE
Status: COMPLETED | OUTPATIENT
Start: 2024-09-03 | End: 2024-09-03

## 2024-09-03 RX ORDER — AMINOPHYLLINE 25 MG/ML
75 INJECTION, SOLUTION INTRAVENOUS
Status: COMPLETED | OUTPATIENT
Start: 2024-09-03 | End: 2024-09-03

## 2024-09-03 RX ORDER — POLYETHYLENE GLYCOL 3350 17 G/17G
17 POWDER, FOR SOLUTION ORAL 2 TIMES DAILY
Qty: 1020 G | Refills: 11 | Status: SHIPPED | OUTPATIENT
Start: 2024-09-03 | End: 2025-09-03

## 2024-09-03 RX ADMIN — TETROFOSMIN 9 MILLICURIE: 1.38 INJECTION, POWDER, LYOPHILIZED, FOR SOLUTION INTRAVENOUS at 08:09

## 2024-09-03 RX ADMIN — TETROFOSMIN 32 MILLICURIE: 1.38 INJECTION, POWDER, LYOPHILIZED, FOR SOLUTION INTRAVENOUS at 10:09

## 2024-09-03 RX ADMIN — AMINOPHYLLINE 75 MG: 25 INJECTION, SOLUTION INTRAVENOUS at 10:09

## 2024-09-03 RX ADMIN — REGADENOSON 0.4 MG: 0.08 INJECTION, SOLUTION INTRAVENOUS at 10:09

## 2024-09-04 ENCOUNTER — OFFICE VISIT (OUTPATIENT)
Dept: DIABETES | Facility: CLINIC | Age: 54
End: 2024-09-04
Payer: MEDICAID

## 2024-09-04 ENCOUNTER — HOSPITAL ENCOUNTER (OUTPATIENT)
Dept: SLEEP MEDICINE | Facility: HOSPITAL | Age: 54
Discharge: HOME | End: 2024-09-04
Attending: INTERNAL MEDICINE
Payer: MEDICAID

## 2024-09-04 ENCOUNTER — OFFICE VISIT (OUTPATIENT)
Dept: PULMONOLOGY | Facility: CLINIC | Age: 54
End: 2024-09-04
Attending: INTERNAL MEDICINE
Payer: MEDICAID

## 2024-09-04 ENCOUNTER — HOSPITAL ENCOUNTER (OUTPATIENT)
Dept: RADIOLOGY | Facility: HOSPITAL | Age: 54
Discharge: HOME | End: 2024-09-04
Attending: INTERNAL MEDICINE
Payer: MEDICAID

## 2024-09-04 ENCOUNTER — TELEPHONE (OUTPATIENT)
Dept: RHEUMATOLOGY | Facility: CLINIC | Age: 54
End: 2024-09-04
Payer: MEDICAID

## 2024-09-04 VITALS
HEIGHT: 56 IN | RESPIRATION RATE: 18 BRPM | BODY MASS INDEX: 45.43 KG/M2 | OXYGEN SATURATION: 97 % | HEART RATE: 89 BPM | WEIGHT: 201.94 LBS | SYSTOLIC BLOOD PRESSURE: 122 MMHG | DIASTOLIC BLOOD PRESSURE: 70 MMHG

## 2024-09-04 DIAGNOSIS — E11.69 DYSLIPIDEMIA ASSOCIATED WITH TYPE 2 DIABETES MELLITUS: ICD-10-CM

## 2024-09-04 DIAGNOSIS — E11.69 TYPE 2 DIABETES MELLITUS WITH OTHER SPECIFIED COMPLICATION, UNSPECIFIED WHETHER LONG TERM INSULIN USE: Primary | ICD-10-CM

## 2024-09-04 DIAGNOSIS — R06.02 SOB (SHORTNESS OF BREATH): Primary | ICD-10-CM

## 2024-09-04 DIAGNOSIS — J45.40 MODERATE PERSISTENT ASTHMA WITHOUT COMPLICATION: ICD-10-CM

## 2024-09-04 DIAGNOSIS — G47.33 OSA (OBSTRUCTIVE SLEEP APNEA): Primary | ICD-10-CM

## 2024-09-04 DIAGNOSIS — I15.2 HYPERTENSION COMPLICATING DIABETES: ICD-10-CM

## 2024-09-04 DIAGNOSIS — E78.5 DYSLIPIDEMIA ASSOCIATED WITH TYPE 2 DIABETES MELLITUS: ICD-10-CM

## 2024-09-04 DIAGNOSIS — E11.59 HYPERTENSION COMPLICATING DIABETES: ICD-10-CM

## 2024-09-04 DIAGNOSIS — E66.9 NOCTURNAL HYPOXEMIA DUE TO OBESITY: Chronic | ICD-10-CM

## 2024-09-04 DIAGNOSIS — G47.36 NOCTURNAL HYPOXEMIA DUE TO OBESITY: Chronic | ICD-10-CM

## 2024-09-04 PROCEDURE — 4010F ACE/ARB THERAPY RXD/TAKEN: CPT | Mod: CPTII,95,, | Performed by: NURSE PRACTITIONER

## 2024-09-04 PROCEDURE — 99213 OFFICE O/P EST LOW 20 MIN: CPT | Mod: 95,,, | Performed by: NURSE PRACTITIONER

## 2024-09-04 PROCEDURE — 99215 OFFICE O/P EST HI 40 MIN: CPT | Mod: PBBFAC,25 | Performed by: PHYSICIAN ASSISTANT

## 2024-09-04 PROCEDURE — 71046 X-RAY EXAM CHEST 2 VIEWS: CPT | Mod: 26,,, | Performed by: RADIOLOGY

## 2024-09-04 PROCEDURE — 3044F HG A1C LEVEL LT 7.0%: CPT | Mod: CPTII,95,, | Performed by: NURSE PRACTITIONER

## 2024-09-04 PROCEDURE — 71046 X-RAY EXAM CHEST 2 VIEWS: CPT | Mod: TC

## 2024-09-04 PROCEDURE — 99999 PR PBB SHADOW E&M-EST. PATIENT-LVL V: CPT | Mod: PBBFAC,,, | Performed by: PHYSICIAN ASSISTANT

## 2024-09-04 RX ORDER — ADHESIVE BANDAGE
5 BANDAGE TOPICAL EVERY 4 HOURS PRN
COMMUNITY
Start: 2024-08-23

## 2024-09-04 NOTE — TELEPHONE ENCOUNTER
----- Message from Stacie Nation sent at 9/4/2024  4:10 PM CDT -----  Contact: PILO YANCEY [[id number]]  ..Type:  Patient Requesting Call    Who Called PILO YANCEY [[id number]]  Does the patient know what this is regarding?: pain management and test results  Would the patient rather a call back or a response via [facility]?  call  Best Call Back Number: .[phone] (home)   Additional Information:

## 2024-09-04 NOTE — TELEPHONE ENCOUNTER
Spoke with patient . She had labs today with Pulmonary. SED and CRP are elevated . Patient state that she is having pain from neck to low back . Across shoulders and across hips . Wants to know what she should do .

## 2024-09-04 NOTE — Clinical Note
Pls monroe barker for f/u CTS bilat hands. Mohini Wyman PA-C [facility]  exuda o roza contra la ropa. Cambie el vendaje a diario.     Mantenga la elyse limpia y seca.   Otras instrucciones    Ade los 3 primeros toby después de la cirugía, es posible que tenga que llevar un sujetador de apoyo o sharon faja torácica todo el tiempo, incluso por la noche.   La atención de seguimiento es sharon parte clave de stanley tratamiento y seguridad. Asegúrese de hacer y acudir a todas las citas, y llame a stanley médico si está teniendo problemas. También es sharon buena idea saber los resultados de dori exámenes y mantener sharon lista de los medicamentos que bryce.  ¿Cuándo debe pedir ayuda?   Llame al 911 en cualquier momento que considere que necesita atención de urgencia. Por ejemplo, llame si:    Se desmayó (perdió el conocimiento).     Tiene dolor en el pecho, le falta el aire o tose patricia.   Llame a stanley médico ahora mismo o busque atención médica inmediata si:    Tiene malestar estomacal o no puede beber líquido.     Tiene dolor que no mejora después de fermin analgésicos.     No puede evacuar heces ni gases.     Tiene señales de un coágulo de patricia en la pierna (llamado trombosis venosa profunda), tales christin:  Dolor en la pantorrilla, la parte posterior de la rodilla, el muslo o la mike.  Hinchazón en la pierna o la mike.  Un cambio de color en la pierna o la mike. La piel podría estar enrojecida o morada, según cuál sea stanley color de piel normal.     Tiene señales de infección, tales christin:  Aumento del dolor, la hinchazón, la temperatura o el enrojecimiento.  Vetas rojizas que salen de la incisión.  Pus que drena de la incisión.  Fiebre.     Tiene puntos de sutura flojos o se le abre la incisión.     Patricia de color mendez brillante wren empapado el vendaje sobre la incisión.   Preste especial atención a los cambios en stanley skyla y asegúrese de comunicarse con stanley médico si:    Tiene algún problema.     Tiene hinchazón o dolor nuevos o peores en el brazo.   ¿Dónde puede encontrar más información?  Vaya a

## 2024-09-04 NOTE — PROGRESS NOTES
Subjective:       Patient ID: Yolanda Betts is a 54 y.o. female.    Chief Complaint: Asthma      50yo here for complaint of daytime fatigue  Has been going on for 6 months, getting worse  Feels foggy during the day, memory issues, concentration issues  In bed around 9pm, sometimes issues falling asleep, tired but cannot fall asleep, will watch tv is unable to sleep, wakes frequently, wakes up gasping for air, witnessed apneas, snores  concerns for narcolepsy, reports narcolepsy history, did not start medications  Complains of legs buckling with emotional changes, leg weakness, unable to stay awake sometimes, she says she has fallen off a treadmill recently due to this  Uses oxygen 2L at night  Taking cymbalta, latuda, klonapin at night for bipolar - recently decreased these medications a few weeks ago, tapering down - followed by Dr Mariam Young  Also with history of fibromyalgia  Recent weight loss, gastric sleeve 6/4/2021  Dx of circadian rhythm disorder controlled with working on sleep hygiene per last pulm note  PSG in 2020 did not reveal sleep apnea, overnight oxygen testing revealed hypoxia    Also with history of asthma  Stable on Symbicort daily and albuterol prn  No signs of infection or exacerbation    04/05/2022  Last visit was 03/14/2022  Follow up to review PSG MSLT  MSL was 6 mins  No REM naps  Mild PLM  No ALEN  Bed time 0930 pm, Wake time 5 am  Meds that may add to sleepiness  Klonopin, Flexeril, Latuda, cymbalta  Spoke about skin reactions: reymundo caballero    08/07/2024   Follow-up visit   Last visit was 04/05/2022  Move to [place] and now returns last   Labored breathing shortness of breath some wheezing   Has gained weight recently   Has been out of asthma control and rescue medications   Asthma control score is 11   Snoring at night   Has home oxygen   Chest x-ray reviewed was unremarkable   Recent emergency room visits at [facility] chest CT scan reviewed   EKG poor R-wave  progression   Refills for inhalers   Nebulizer reordered     9/4/24  Established patient here for asthma follow up  Seen last month by Dr. aRiney, restarted symbicort BID  Using albuterol inhaler every 4 hours without relief of SOB  SOB has worsened since last visit  Always short of breath  Pain under left breast, she says this is constant, has been there over a year  Also has cough and wheezing at night  She says her SOB started about 2 months ago after she had COVID  Went to ER last week - cardiac workup normal, Chest X Ray clear  Had echo, stress test yesterday - normal        Asthma Control Test  In the past 4  weeks, how much of the time did your asthma keep you from getting as much done at work, school or at home?: Most of the time  During the past 4 weeks, how often have you had shortness of breath?: Once a day  During the past 4 weeks, how often did your asthma symptoms (wheezing, couging, shortness of breath, chest tightness or pain) wake you up at night or earlier than usual in the morning?: 2 or 3 nights a week  During the past 4 weeks, how often have you used your rescue inhaler or nebulizer medication (such as albuterol)?: 3 or more times per day  How would you rate your asthma control during the past 4 weeks?: Poorly controlled  If your score is 19 or less, your asthma may not be under control: 9   EPWORTH SLEEPINESS SCALE 4/5/2022   Sitting and reading 3   Watching TV 3   Sitting, inactive in a public place (e.g. a theatre or a meeting) 3   As a passenger in a car for an hour without a break 3   Lying down to rest in the afternoon when circumstances permit 3   Sitting and talking to someone 1   Sitting quietly after a lunch without alcohol 3   In a car, while stopped for a few minutes in traffic 3   Total score 22          Immunization History   Administered Date(s) Administered    COVID-19, MRNA, LN-S, PF (MODERNA FULL 0.5 ML DOSE) 03/30/2021, 04/26/2021    Influenza 10/01/2018    Influenza -  Quadrivalent - PF *Preferred* (6 months and older) 12/21/2017, 09/24/2020, 02/16/2023, 10/09/2023    Pneumococcal Conjugate - 20 Valent 01/17/2023    Pneumococcal Polysaccharide - 23 Valent 01/31/2017    Tdap 12/21/2017    Zoster Recombinant 01/17/2023, 06/27/2023      Tobacco Use: Low Risk  (9/4/2024)    Patient History     Smoking Tobacco Use: Never     Smokeless Tobacco Use: Never     Passive Exposure: Not on file      Past Medical History:   Diagnosis Date    Abnormal Pap smear of cervix     HPV genital warts    Anemia     Anxiety     Arthritis     Asthma 10/11/2016    Bipolar 1 disorder     Diabetes mellitus, type 2     Dyslipidemia associated with type 2 diabetes mellitus 05/13/2019    General anesthetics causing adverse effect in therapeutic use     Genital warts     GERD (gastroesophageal reflux disease)     Herpes simplex virus (HSV) infection     Hyperlipidemia     Hypertension     Hypertension complicating diabetes 05/05/2019    Migraine with aura and without status migrainosus, not intractable 03/21/2016    Mild persistent asthma without complication 10/11/2016    Morbid obesity with body mass index (BMI) of 45.0 to 49.9 in adult 08/03/2017    Obstructive sleep apnea     ALEN (obstructive sleep apnea)     2L per N/C q HS    Schizoaffective disorder, bipolar type 04/25/2019    Seasonal allergic rhinitis due to pollen 05/13/2019    Type 2 diabetes mellitus with hyperglycemia, with long-term current use of insulin 12/21/2017    Type 2 diabetes mellitus with hyperglycemia, without long-term current use of insulin 12/21/2017      Current Outpatient Medications on File Prior to Visit   Medication Sig Dispense Refill    albuterol (PROVENTIL) 2.5 mg /3 mL (0.083 %) nebulizer solution Take 2.5 mg by nebulization every 4 (four) hours as needed. Times a day 75 mL 1    albuterol (PROVENTIL/VENTOLIN HFA) 90 mcg/actuation inhaler Inhale 2 puffs into the lungs every 6 (six) hours as needed for Wheezing. 8.5 g 1     "ALPRAZolam (XANAX) 1 MG tablet Take 1 tablet (1 mg total) by mouth 2 (two) times daily as needed for Anxiety. 60 tablet 2    amlodipine-valsartan (EXFORGE)  mg per tablet Take 1 tablet by mouth once daily. 90 tablet 3    atorvastatin (LIPITOR) 20 MG tablet Take 1 tablet (20 mg total) by mouth every evening 90 tablet 3    azelastine (ASTELIN) 137 mcg (0.1 %) nasal spray 2 sprays (274 mcg total) by Nasal route 2 (two) times daily. 30 mL 0    BD INSULIN SYRINGE ULTRA-FINE 1/2 mL 30 gauge x 1/2" Syrg   0    blood-glucose sensor (DEXCOM G6 SENSOR) Mariela change sensor every 10 days. 3 each 11    blood-glucose transmitter (DEXCOM G6 TRANSMITTER) Mariela 1 each by Misc.(Non-Drug; Combo Route) route every 3 (three) months. 1 each 3    celecoxib (CELEBREX) 100 MG capsule Take 1 capsule (100 mg total) by mouth 2 (two) times daily. 60 capsule 2    cyclobenzaprine (FLEXERIL) 10 MG tablet Take 1 tablet (10 mg total) by mouth 3 (three) times daily as needed (Pain). 90 tablet 11    DULoxetine (CYMBALTA) 60 MG capsule Take 1 capsule by mouth 2 times daily 180 capsule 3    ergocalciferol (VITAMIN D2) 50,000 unit Cap Take 1 capsule twice weekly for 3 weeks then weekly 12 capsule 3    estradioL (ESTRACE) 0.01 % (0.1 mg/gram) vaginal cream Place 1 g vaginally twice a week. 42.5 g 3    fenofibrate (TRICOR) 145 MG tablet Take 1 tablet (145 mg total) by mouth once daily. 90 tablet 3    frovatriptan (FROVA) 2.5 MG tablet Take 2.5 mg by mouth daily as needed.      furosemide (LASIX) 20 MG tablet Take 1 tablet (20 mg total) by mouth once daily. 90 tablet 3    glucagon (BAQSIMI) 3 mg/actuation [place] SPRAY 1 SPRAY IN NOSTRIL AS NEEDED FOR EMERGENCY. MAY REPEAT 1 TIME AFTER 15 MINUTES 2 each 1    insulin aspart U-100 (NOVOLOG U-100 INSULIN ASPART) 100 unit/mL injection To use via insulin pump. Up to 200 units every 3 days 20 mL 5    insulin aspart U-100 (NOVOLOG) 100 unit/mL injection NovoLOG FlexPen      insulin pump cart,automated,BT (OMNIPOD " "5 G6 PODS, GEN 5,) Crtg Inject into skin every 3 days as directed 30 each 3    insulin syringe-needle U-100 0.3 mL 30 gauge x 5/16" Syrg 1 each by Misc.(Non-Drug; Combo Route) route Every 3 (three) days. 100 each 2    lamoTRIgine (LAMICTAL) 200 MG tablet Take 1 tablet (200 mg total) by mouth 2 (two) times daily. 60 tablet 2    levocetirizine (XYZAL) 5 MG tablet Take 1 tablet (5 mg total) by mouth every evening. 30 tablet 11    LIDOcaine (LIDODERM) 5 % Place 2 patches onto the skin every 12 (twelve) hours as needed (pain). Remove & Discard patch within 12 hours or as directed by MD 60 patch 11    lifitegrast (XIIDRA) 5 % Dpet Place 1 drop into both eyes 2 (two) times daily. 60 each 12    lumateperone (CAPLYTA) 10.5 mg Cap Take 1 capsule (10.5 mg total) by mouth once daily. 30 capsule 2    magic mouthwash diphen/antac/lidoc/nysta Swish and spit 5 mLs every 4 (four) hours as needed. 118 mL 0    magnesium hydroxide 400 mg/5 ml (MILK OF MAGNESIA) 400 mg/5 mL Susp Take 5 mLs by mouth every 4 (four) hours as needed.      magnesium oxide (MAG-OX) 400 mg (241.3 mg magnesium) tablet Take 1 tablet (400 mg total) by mouth once daily. 90 tablet 0    metoprolol succinate (TOPROL-XL) 50 MG 24 hr tablet Take 1 tablet (50 mg total) by mouth every morning. 30 tablet 11    nebulizer and compressor (COMP-AIR NEBULIZER COMPRESSOR) Mariela use as directed 1 each 0    nystatin (MYCOSTATIN) 100,000 unit/mL suspension Take 4 mLs (400,000 Units total) by mouth 4 (four) times daily. for 10 days 160 mL 0    omeprazole (PRILOSEC) 40 MG capsule Take 1 capsule (40 mg total) by mouth once daily. 90 capsule 1    OZEMPIC 2 mg/dose (8 mg/3 mL) PnIj Inject 2 mg into the skin every 7 days.      pen needle, diabetic (BD ULTRA-FINE MINI PEN NEEDLE) 31 gauge x 3/16" Ndle Use one needle 5 times a day 200 each 11    pen needle, diabetic (BD ULTRA-FINE MINI PEN NEEDLE) 31 gauge x 3/16" Ndle Use 5 a day 200 each 11    pen needle, diabetic (BD ULTRA-FINE MINI PEN " "NEEDLE) 31 gauge x 3/16" Ndle USE TO INJECT INSULIN 5 TIMES DAILY 200 each 10    polyethylene glycol (GLYCOLAX) 17 gram/dose powder Take 17 g by mouth 2 (two) times daily. 1020 g 11    promethazine (PHENERGAN) 25 MG tablet Take 25 mg by mouth every 6 (six) hours as needed.      secukinumab (COSENTYX PEN, 2 PENS,) 150 mg/mL PnIj Inject 300 mg into the skin every 14 (fourteen) days. 4 mL 3    semaglutide (OZEMPIC) 2 mg/dose (8 mg/3 mL) PnIj Inject 2 mg into the skin every 7 days. 3 mL 2    SYMBICORT 160-4.5 mcg/actuation HFAA Inhale 2 puffs into the lungs in the morning and 2 puffs before bedtime. 2 puffs every 12 hours 10.2 g 2    zolpidem (AMBIEN) 5 MG Tab Take 1 tablet (5 mg total) by mouth nightly as needed (anxiety). 30 tablet 2    amlodipine-valsartan (EXFORGE)  mg per tablet Take 1 tablet by mouth in the morning. 90 tablet 1    amlodipine-valsartan (EXFORGE)  mg per tablet Take 1 tablet by mouth every morning 90 tablet 1    blood-glucose meter,continuous (DEXCOM G7 ) Misc 1 each by Misc.(Non-Drug; Combo Route) route once. for 1 dose 1 each 0    blood-glucose sensor (DEXCOM G7 SENSOR) Mariela Use to check bg. Change every 10 days 3 each 11    blood-glucose transmitter (DEXCOM G6 TRANSMITTER) Mariela Use as directed change every 3 months 1 each 3    glucagon (BAQSIMI) 3 mg/actuation [place] 1 spray by Nasal route as needed (hypoglycemia). For emergency use. May repeat 1 time after 15 minutes 2 each 1    methylPREDNISolone (MEDROL DOSEPACK) 4 mg tablet use as directed 21 tablet 1    [DISCONTINUED] clonazePAM (KLONOPIN) 1 MG tablet Take 1 tablet by mouth daily 30 tablet 0    [DISCONTINUED] glucagon, human recombinant, (GLUCAGON EMERGENCY KIT, HUMAN,) 1 mg SolR Inject 1 mg into the muscle as needed. Emergency use 1 each 1    [DISCONTINUED] valsartan (DIOVAN) 320 MG tablet Take 1 tablet (320 mg total) by mouth once daily. 90 tablet 3     Current Facility-Administered Medications on File Prior to Visit " "  Medication Dose Route Frequency Provider Last Rate Last Admin    ondansetron injection 4 mg  4 mg Intravenous Once PRN Otto Phillips MD            Review of Systems   Constitutional:  Positive for fatigue. Negative for fever, weight loss, appetite change and weakness.   HENT:  Negative for postnasal drip, rhinorrhea, sinus pressure, trouble swallowing and congestion.    Respiratory:  Positive for apnea, snoring, shortness of breath, wheezing and somnolence. Negative for cough, sputum production, choking, chest tightness and dyspnea on extertion.    Cardiovascular:  Negative for chest pain and leg swelling.   Musculoskeletal:  Negative for arthralgias, gait problem and joint swelling.   Gastrointestinal:  Negative for nausea, vomiting and abdominal pain.   Neurological:  Negative for dizziness, weakness and headaches.   All other systems reviewed and are negative.      Objective:       Vitals:    09/04/24 0936   BP: 122/70   Pulse: 89   Resp: 18   SpO2: 97%   Weight: 91.6 kg (201 lb 15.1 oz)   Height: 4' 8" (1.422 m)           Physical Exam   Constitutional: She is oriented to person, place, and time. She appears well-developed and well-nourished. No distress. She is obese.   HENT:   Head: Normocephalic.   Mouth/Throat: Oropharynx is clear and moist.   Cardiovascular: Normal rate and regular rhythm.   Pulmonary/Chest: Effort normal and breath sounds normal. No respiratory distress. She has no wheezes. She has no rhonchi. She has no rales.   Musculoskeletal:         General: No edema.      Cervical back: Normal range of motion and neck supple.   Neurological: She is alert and oriented to person, place, and time. Gait normal.   Skin: Skin is warm and dry.   Vitals reviewed.    Personal Diagnostic Review    X-Ray Chest PA And Lateral  Narrative: EXAM: XR CHEST PA AND LATERAL    CLINICAL HISTORY:  Asthma    TECHNIQUE: 2 views of the chest.    COMPARISON: 08/07/2024 x-ray    FINDINGS:  The heart size is normal. The " lung fields are clear. No acute process is identified.  Impression:  Negative two-view chest x-ray.    Finalized on: 9/4/2024 9:59 AM By:  Neville Preciado MD  [facility]# [id number]      2024-09-04 10:01:08.862    [facility]    FeNo was 21    FEV1: 1.47L( 83.9%)  FVC 1.78L( 83.1%)  FEV1/FVC 83        Assessment/Plan:       Problem List Items Addressed This Visit          Pulmonary    Moderate persistent asthma without complication     Continue symbicort BID  Albuterol prn  Lungs are clear on exam  Spirometry and FeNO are normal  Last Chest CT lungs ok  Will get IgE, sed rate, D-dimer today            Other Visit Diagnoses       SOB (shortness of breath)    -  Primary    Relevant Orders    Sedimentation rate    C-REACTIVE PROTEIN    IGE    D-Dimer, Quantitative          Labs today. Further plan pending lab results.      This note was prepared using voice recognition system and is likely to have sound alike errors that may have been overlooked even after proof reading.  Please call me with any questions    Discussed diagnosis, its evaluation, treatment and usual course. All questions answered.    Thank you for the courtesy of participating in the care of this patient    Analisa Bacon PA-C

## 2024-09-04 NOTE — ASSESSMENT & PLAN NOTE
Continue symbicort BID  Albuterol prn  Lungs are clear on exam  Spirometry and FeNO are normal  Last Chest CT lungs ok  Will get IgE, sed rate, D-dimer today

## 2024-09-04 NOTE — PROGRESS NOTES
Subjective:         Patient ID: Yolanda Betts is a 54 y.o. female.  Patient's current PCP is Robe Britton MD.       Chief Complaint: No chief complaint on file.      HPI  Yolanda Betts is a 54 y.o. Black or  female presenting for a follow up for diabetes. Patient has been diagnosed with diabetes for > 10 years.     Complications related to diabetes:  HTN, Hyperlipidemia, peripheral neuropathy; denies Pancreatitis; denies Gastroparesis; denies DKA; denies Hx/family Hx of MEN2/MTC; reports Frequent UTIs/yeast infections; Bariatric surgery 6/1/21. IBS- diarrhea/constipation    Past failed treatment include:  Jardiance- multiple yeast infections; Victoza - GI upset. GLP-1- not advised due to GI issues (per GI and DM management), Metformin- low BGs    Blood glucose testing is performed regularly with her Dexcom. Interpretation of CGMS as follows: Average Glucose:171  mg/dl; 6 % Very High; 33% High;61% In Range; 0% Low;  0% Very Low.     Compliance:The patient reports medication and diet compliance most of the time.       The patient location is: [place], [place]  The chief complaint leading to consultation is: DM follow up    Visit type: audiovisual    Face to Face time with patient: 20 minutes of total time spent on the encounter, which includes face to face time and non-face to face time preparing to see the patient (eg, review of tests), Obtaining and/or reviewing separately obtained history, Documenting clinical information in the electronic or other health record, Independently interpreting results (not separately reported) and communicating results to the patient/family/caregiver, or Care coordination (not separately reported). Each patient to whom he or she provides medical services by telemedicine is:  (1) informed of the relationship between the physician and patient and the respective role of any other health care provider with respect to management of the patient; and (2) notified  that he or she may decline to receive medical services by telemedicine and may withdraw from such care at any time.         //   , There is no height or weight on file to calculate BMI.  Her blood sugar in clinic today is:   Lab Results   Component Value Date    POCGLU 78 03/10/2023       Labs reviewed and are noted below.  Her most recent A1C is:  Lab Results   Component Value Date    HGBA1C 6.6 (H) 06/27/2024    HGBA1C 6.5 (H) 11/29/2023    HGBA1C 5.6 08/08/2023     Lab Results   Component Value Date    CPEPTIDE 4.56 02/26/2018     Lab Results   Component Value Date    GLUTAMICACID 0.00 02/26/2018     Glucose   Date Value Ref Range Status   07/30/2024 108 70 - 110 mg/dL Final     Anion Gap   Date Value Ref Range Status   07/30/2024 8 8 - 16 mmol/L Final     eGFR if    Date Value Ref Range Status   03/08/2022 >60.0 >60 mL/min/1.73 m^2 Final     eGFR if non    Date Value Ref Range Status   03/08/2022 >60.0 >60 mL/min/1.73 m^2 Final     Comment:     Calculation used to obtain the estimated glomerular filtration  rate (eGFR) is the CKD-EPI equation.          CURRENT DM MEDICATIONS:   Diabetes Medications               glucagon (BAQSIMI) 3 mg/actuation Spry 1 spray by Nasal route as needed (hypoglycemia). For emergency use. May repeat 1 time after 15 minutes    insulin aspart U-100 (NOVOLOG FLEXPEN U-100 INSULIN) 100 unit/mL (3 mL) InPn pen Inject 24 Units into the skin 3 (three) times daily before meals. Use per sliding scale for breakfast and dinner    insulin aspart U-100 (NOVOLOG U-100 INSULIN ASPART) 100 unit/mL injection Inject 100 Units into the skin Every 3 (three) days. To use via insulin pump                   Diabetes Management Status    Statin: Taking  ACE/ARB: Taking    Screening or Prevention Patient's value Goal Complete/Controlled?   HgA1C Testing and Control   Lab Results   Component Value Date    HGBA1C 6.6 (H) 06/27/2024      Annually/Less than 8% Yes   Lipid profile  : 04/29/2024 Annually Yes   LDL control Lab Results   Component Value Date    LDLCALC 123 (H) 04/29/2024    Annually/Less than 100 mg/dl  Yes   Nephropathy screening Lab Results   Component Value Date    LABMICR 22.0 12/11/2023     Lab Results   Component Value Date    PROTEINUA Trace (A) 01/23/2024    Annually Yes   Blood pressure BP Readings from Last 1 Encounters:   09/04/24 122/70    Less than 140/90 Yes   Dilated retinal exam : 03/08/2024 Annually Yes   Foot exam   : 02/16/2023 Annually Yes     Review of Systems   Constitutional:  Positive for appetite change (decreased). Negative for activity change.   Gastrointestinal:  Positive for constipation and diarrhea. Negative for abdominal pain, nausea and vomiting.        Hx of IBS, GERD   Endocrine: Positive for polydipsia. Negative for polyphagia and polyuria.   Musculoskeletal:  Positive for arthralgias.   Neurological:  Negative for syncope and weakness.        Neuropathy   Psychiatric/Behavioral:  Negative for confusion.         Depression         Objective:      Physical Exam  Constitutional:       General: She is not in acute distress.     Appearance: She is not ill-appearing.   Neck:      Thyroid: Mass: neg of self exam.   Neurological:      Mental Status: She is alert.   Psychiatric:         Speech: Speech normal.         Assessment:       1. Type 2 diabetes mellitus with other specified complication, unspecified whether long term insulin use    2. Hypertension complicating diabetes    3. Dyslipidemia associated with type 2 diabetes mellitus                    Plan:   Type 2 diabetes mellitus with other specified complication, unspecified whether long term insulin use    Hypertension complicating diabetes    Dyslipidemia associated with type 2 diabetes mellitus        OP5  Novolog  Basal 0.35 Units/hr  Insulin : Carb Ratios 24 g/Unit  Sensitivity (ISF, Correction) 80 mg/dL  BG Target Range 110 mg/dL (+0/-0)  BG Correction Threshold 110 mg/dL         PLAN:    - Condition: suboptimal control   - Monitor blood glucose 4x daily. Goals reviewed  - She is working with the RD  - BP and LDL- Recommended lifestyle modifications for management. Encouraged healthy low fat, low carb diet and increase physical activity  - Pump Changes: lower CF to 65  - Continue to see the psychiatrist for depression (causing reduced appetite); discussed coping techniques  - The patient was explained the above plan and given opportunity to ask questions.  She understands, chooses and consents to this plan and accepts all the risks, which include but are not limited to the risks mentioned above.   - Labs ordered as above  - Nurse visit:  deferred    - Follow up: 1 month        For pump failure:  Use Novolog IC 20 (basal not needed in the past)    Correction scale (for steroid use) :  Novolog every 3 hours using correction scale outside of mealtime.  -200: 2 units  -250: 4 units  -300: 6 units  -350: 8 units  BG greater than 350: 10 units      A total of 20 minutes was spent in face to face time, of which over 50% was spent in counseling patient on disease process, complications, treatment, and side effects of medications.

## 2024-09-05 ENCOUNTER — TELEPHONE (OUTPATIENT)
Dept: PULMONOLOGY | Facility: CLINIC | Age: 54
End: 2024-09-05
Payer: MEDICAID

## 2024-09-05 NOTE — PROCEDURES
PHYSICIAN INTERPRETATION AND COMMENTS: Findings are consistent with moderate, non-positional obstructive sleep  apnea(ALEN).  CLINICAL HISTORY: 54 year old female presented with: 16 inch neck, BMI of 44.8, an [place] sleepiness score of 18, history  of hypertension, heart disease, diabetes, depression, a previous diagnosis of ALEN, lung disease, daily use of supplemental  oxygen and symptoms of nocturnal snoring, waking up choking and witnessed apneas. Based on the clinical history, the  patient has a high pre-test probability of having Severe ALEN.  SLEEP STUDY FINDINGS: Patient underwent a 1 night Home Sleep Test and by behavioral criteria, slept for approximately  6.38 hours, with a sleep latency of 30 minutes and a sleep efficiency of 95%. Mild sleep disordered breathing (AHI=12) is  noted based on a 4% hypopnea desaturation criteria. The patient slept supine 38.2% of the night based on valid recording  time of 6.2 hours. When considering more subtle measures of sleep disordered breathing, the overall respiratory  disturbance index is moderate(RDI=23) based on a 1% hypopnea desaturation criteria with confirmation by surrogate  arousal indicators. The apneas/hypopneas are accompanied by minimal oxygen desaturation (percent time below 90%  SpO2: 0%, Min SpO2: 85.8%, Baseline SpO2: 97%). The average desaturation across all sleep disordered breathing events is  2.2%. The mean pulse rate is 83.9 BPM.  TREATMENT CONSIDERATIONS: Consider an attended CPAP titration study, given the reported Lung and Heart disease.  Consider nasal continuous positive airway pressure based on the RDI severity, daytime somnolence and co-morbidities. A  mandibular advancement splint (MAS) or referral to an ENT surgeon for modification to the airway should be considered  to reduce daytime somnolence and the potential contribution of ALEN on existing diseases if the patient prefers an  alternative therapy or the CPAP trial is  "unsuccessful.  DISEASE MANAGEMENT CONSIDERATIONS: Based on the SpO2, further evaluation by awake arterial blood gases or pulse  oximetry may be indicated to confirm the need for supplemental oxygen. Insomnia is a symptom that can be associated  with untreated ALEN. Sleeping pills may increase the severity of untreated ALEN and their use should be reevaluated once the  ALEN is treated.      Dear Sylvain Rainey MD  [id number] [facility]  [place] [id number]/Robe Britton MD         The sleep study that you ordered is complete.  You have ordered sleep LAB services to perform the sleep study for Yolanda Betts .      Please find Sleep Study result in  the "Media tab" of Chart Review menu.        You can look  for the report in the  Media by the document type "Sleep Study Documents". Alphabetizing  "Document type" column helps to find the SLEEP STUDY report  Faster.       As the ordering provider, you are responsible for reviewing the results and implementing a treatment plan with your patient.    If you need a Sleep Medicine provider to explain the sleep study findings and arrange treatment for the patient, please refer patient for consultation to our Sleep Clinic via [facility] with Ambulatory Consult Sleep.     To do that please place an order for an  "Ambulatory Consult Sleep" -  order , it will go to our clinic work queue for our staff  to contact the patient for an appointment.      For any questions, please contact our sleep lab  staff at [phone] to talk to clinical staff          Sylvain Rainey MD   "

## 2024-09-05 NOTE — TELEPHONE ENCOUNTER
Spoke with patient regarding appts. Staff offered appts. Patient accepted. Staff informed patient it would be a wait between appointments. Patient expressed understanding and stated that would be fine. ----- Message from Analisa Bacon PA-C sent at 9/5/2024  8:04 AM CDT -----  Please schedule patient for 6 minute walk, ABG, and follow up with one of the lung doctors at their next available spot. Visit is for shortness of breath.

## 2024-09-05 NOTE — TELEPHONE ENCOUNTER
Per Dr. Gibbs:        Elevation of systemic marker of inflamamtion non-specific / non-diagnostic.      Patient informed of above . Patient states that her inflammation markers are always elevated . Explained to patient that anything can increase her inflammation like her Diabetes-- Pulmonary issues-- Heart diease - and her arthritis . Patient has a follow up with    Dr PARKER who will be able to investigate the elevation further

## 2024-09-10 ENCOUNTER — TELEPHONE (OUTPATIENT)
Dept: DIABETES | Facility: CLINIC | Age: 54
End: 2024-09-10
Payer: MEDICAID

## 2024-09-10 ENCOUNTER — TELEPHONE (OUTPATIENT)
Dept: RHEUMATOLOGY | Facility: CLINIC | Age: 54
End: 2024-09-10
Payer: MEDICAID

## 2024-09-10 NOTE — TELEPHONE ENCOUNTER
Patient wants to know if she can take cosentyx due to her low immune system. She had covid two months ago and would like some advice. Can you please assist

## 2024-09-10 NOTE — TELEPHONE ENCOUNTER
----- Message from Livia Yeager sent at 9/10/2024 11:27 AM CDT -----  Contact: self   .Type: Patient Call Back        Who called:   Patient      What is the request in detail:    Called in concerning blood sugar ., Patient would like a call back   Can the clinic reply by [facility]?           Would the patient rather a call back or a response via [facility]?      call   Best call back number:  [phone]

## 2024-09-10 NOTE — TELEPHONE ENCOUNTER
Called pt back. She is concerned because she is running higher than normal.  Extreme dry mouth, ulcers in mouth. On nystatin swish and swallow. Few mouths ago had covid and is experiencing long term effects from Covid. Nothing else has been going on the last several days.     On Omnipod 5 report pulled and cc'ed to provider covering for Elizabeth Ceron.

## 2024-09-10 NOTE — TELEPHONE ENCOUNTER
Coverage for Elizabeth Ceron NP:     Pt called and concerned about BG's higher than usual especially pp. Dexcom and Omni report pulled for review. 48 hrs Dex snip below:           Per pt, Omnipod ran out of insulin yesterday pm and she has not resumed. Has IC dosing and CF dosing for Novolog only since did not require basal in the past but now rising in between correction dosing. Discussed resuming omnipod today. May need to consider addition of basal insulin to back up plan by provider in the future. Will cc Elizabeth Ceron NP to discuss with pt. Pt was thankful for call and plans to resume pod today.     Ayush Palomares PA-C, BC-ADM  [facility]

## 2024-09-10 NOTE — TELEPHONE ENCOUNTER
----- Message from Danae Campo sent at 9/10/2024 12:47 PM CDT -----  Contact: self  [phone]  Patient had covid and she has questions about medication that may lower her immune system. Please call to advise

## 2024-09-11 ENCOUNTER — NURSE TRIAGE (OUTPATIENT)
Dept: ADMINISTRATIVE | Facility: CLINIC | Age: 54
End: 2024-09-11
Payer: MEDICAID

## 2024-09-12 ENCOUNTER — TELEPHONE (OUTPATIENT)
Dept: DIABETES | Facility: CLINIC | Age: 54
End: 2024-09-12
Payer: MEDICAID

## 2024-09-12 ENCOUNTER — CLINICAL SUPPORT (OUTPATIENT)
Dept: PULMONOLOGY | Facility: CLINIC | Age: 54
End: 2024-09-12
Payer: MEDICAID

## 2024-09-12 ENCOUNTER — OFFICE VISIT (OUTPATIENT)
Dept: PULMONOLOGY | Facility: CLINIC | Age: 54
End: 2024-09-12
Payer: MEDICAID

## 2024-09-12 VITALS
WEIGHT: 204.13 LBS | OXYGEN SATURATION: 98 % | HEIGHT: 56 IN | DIASTOLIC BLOOD PRESSURE: 79 MMHG | SYSTOLIC BLOOD PRESSURE: 162 MMHG | BODY MASS INDEX: 45.92 KG/M2 | RESPIRATION RATE: 19 BRPM | HEART RATE: 80 BPM

## 2024-09-12 VITALS — BODY MASS INDEX: 45.92 KG/M2 | WEIGHT: 204.13 LBS | HEIGHT: 56 IN

## 2024-09-12 DIAGNOSIS — R06.02 SOB (SHORTNESS OF BREATH): ICD-10-CM

## 2024-09-12 DIAGNOSIS — R06.02 SOB (SHORTNESS OF BREATH): Primary | ICD-10-CM

## 2024-09-12 DIAGNOSIS — R06.00 DYSPNEA DUE TO COVID-19: Chronic | ICD-10-CM

## 2024-09-12 DIAGNOSIS — U07.1 DYSPNEA DUE TO COVID-19: Chronic | ICD-10-CM

## 2024-09-12 DIAGNOSIS — G47.33 OBSTRUCTIVE SLEEP APNEA: Primary | ICD-10-CM

## 2024-09-12 PROBLEM — J45.909 ASTHMA: Status: RESOLVED | Noted: 2021-02-02 | Resolved: 2024-09-12

## 2024-09-12 PROBLEM — E66.2 OBESITY HYPOVENTILATION SYNDROME: Status: RESOLVED | Noted: 2019-06-03 | Resolved: 2024-09-12

## 2024-09-12 LAB
ALLENS TEST: ABNORMAL
DELSYS: ABNORMAL
FIO2: 21
HCO3 UR-SCNC: 27.4 MMOL/L (ref 24–28)
MODE: ABNORMAL
PCO2 BLDA: 39.7 MMHG (ref 35–45)
PH SMN: 7.45 [PH] (ref 7.35–7.45)
PO2 BLDA: 92 MMHG (ref 80–100)
POC BE: 3 MMOL/L
POC SATURATED O2: 97 % (ref 95–100)
SAMPLE: ABNORMAL
SITE: ABNORMAL

## 2024-09-12 PROCEDURE — 99215 OFFICE O/P EST HI 40 MIN: CPT | Mod: PBBFAC,27 | Performed by: INTERNAL MEDICINE

## 2024-09-12 PROCEDURE — 94618 PULMONARY STRESS TESTING: CPT | Mod: PBBFAC

## 2024-09-12 PROCEDURE — 82803 BLOOD GASES ANY COMBINATION: CPT | Mod: PBBFAC

## 2024-09-12 PROCEDURE — 99999PBSHW PR PBB SHADOW TECHNICAL ONLY FILED TO HB: Mod: PBBFAC,,,

## 2024-09-12 PROCEDURE — 99999 PR PBB SHADOW E&M-EST. PATIENT-LVL I: CPT | Mod: PBBFAC,,,

## 2024-09-12 PROCEDURE — 99999 PR PBB SHADOW E&M-EST. PATIENT-LVL V: CPT | Mod: PBBFAC,,, | Performed by: INTERNAL MEDICINE

## 2024-09-12 PROCEDURE — 99211 OFF/OP EST MAY X REQ PHY/QHP: CPT | Mod: PBBFAC

## 2024-09-12 PROCEDURE — 36600 WITHDRAWAL OF ARTERIAL BLOOD: CPT | Mod: PBBFAC

## 2024-09-12 RX ORDER — INSULIN GLARGINE 100 [IU]/ML
INJECTION, SOLUTION SUBCUTANEOUS
Qty: 15 ML | Refills: 3 | Status: SHIPPED | OUTPATIENT
Start: 2024-09-12

## 2024-09-12 RX ORDER — PEN NEEDLE, DIABETIC 30 GX3/16"
NEEDLE, DISPOSABLE MISCELLANEOUS
Qty: 50 EACH | Refills: 11 | Status: SHIPPED | OUTPATIENT
Start: 2024-09-12

## 2024-09-12 NOTE — TELEPHONE ENCOUNTER
----- Message from London Hernandez sent at 9/12/2024  1:34 PM CDT -----  Contact: Yolanda  .Type:  Needs Medical Advice    Who Called:  Yolanda     Would the patient rather a call back or a response via [facility]?  Call back   Best Call Back Number:  [phone] (home)    Additional Information:  Pt is calling in regard to getting a call back from Yara Sanchez to discuss concerns pertaining to the Omipod      Thanks

## 2024-09-12 NOTE — TELEPHONE ENCOUNTER
Called pt she said omnrosamaria told her the pdm is not currently giving her issues while o thephone with Rep so she can call back if she has issues again.     I explained the plan to her that Sara sent for Lantus, Novolog, and corretion dosing in gaet detail so she could write it down. We did an explample togtehr to make sure she undertood. I advicesd her to  the lnatus to have because the PDM may likely give her issues. She undretsands the plan and will let us know if the PDM starts acting up again and will initiate the plan per Sara.

## 2024-09-12 NOTE — PROGRESS NOTES
Abg not done on patient. Patient stated she is having chest pain, and feels like she is going to pass out. Patient stated she has been having chest pain for about a week now and was seen last week for it. She stated she does not remember what became of that appt. Patients current /72, HR 83, RR 16, 100% Spo2 on RA. Patient having difficulty forming sentences;long pausing between words. Patient stated that her sugars have been off lately as well; offered patient a coke or snack and she declined. Patient stated that she did not drive to her appts today and that she has someone with her here today. I recommended to the patient that she should go to the ED to get checked out if she is experiencing chest pain, or to contact her primary care. Patient declined. Patient is waiting in [facility] for her appt with Dr. Lopes. Notified Dr. Lopes in [place] and [facility].

## 2024-09-12 NOTE — PROCEDURES
"[facility]  Six Minute Walk     SUMMARY     Ordering Provider: Analisa Bacon PA-C   Interpreting Provider: Atilio Lopes MD  Performing nurse/tech/RT: VT, RT  Diagnosis: Shortness of Breath  Height: 4' 8" (142.2 cm)  Weight: 92.6 kg (204 lb 2.3 oz)  BMI (Calculated): 45.8                Phase Oxygen Assessment Supplemental O2 Heart   Rate Blood Pressure Deysi Dyspnea Scale Rating   Resting 100 % Room Air 85 bpm 139/80 3   Exercise        Minute        1 92 % Room Air 91 bpm     2 98 % Room Air 105 bpm     3 97 % Room Air 107 bpm     4 91 % Room Air 104 bpm     5 90 % Room Air 123 bpm     6  91 % Room Air 107 bpm 162/79 5-6   Recovery        Minute        1 97 % Room Air 95 bpm     2 100 % Room Air 87 bpm     3 100 % Room Air 87 bpm     4 100 % Room Air 87 bpm 133/72 3     Six Minute Walk Summary  6MWT Status: completed without stopping  Patient Reported: Dyspnea, Dizziness, Lightheadedness, Chest pain/tightness     Interpretation:  Did the patient stop or pause?: No                                         Total Time Walked (Calculated): 360 seconds  Final Partial Lap Distance (feet): 25 feet  Total Distance Meters (Calculated): 312.42 meters  Predicted Distance Meters (Calculated): 454.43 meters  Percentage of Predicted (Calculated): 68.75  Peak VO2 (Calculated): 13.35  Mets: 3.81  Has The Patient Had a Previous Six Minute Walk Test?: No       Previous 6MWT Results  Has The Patient Had a Previous Six Minute Walk Test?: No      "

## 2024-09-12 NOTE — PROGRESS NOTES
Subjective:      Patient ID: Yolanda Betts is a 54 y.o. female.    Chief Complaint: Shortness of Breath    Shortness of Breath        08/07/2024   Follow-up visit   Last visit was 04/05/2022  Move to [place] and now returns last   Labored breathing shortness of breath some wheezing   Has gained weight recently   Has been out of asthma control and rescue medications   Asthma control score is 11   Snoring at night   Has home oxygen   Chest x-ray reviewed was unremarkable   Recent emergency room visits at [facility] chest CT scan reviewed   EKG poor R-wave progression   Refills for inhalers   Nebulizer reordered      9/4/24  Established patient here for asthma follow up  Seen last month by Dr. Rainey, restarted symbicort BID  Using albuterol inhaler every 4 hours without relief of SOB  SOB has worsened since last visit  Always short of breath  Pain under left breast, she says this is constant, has been there over a year  Also has cough and wheezing at night  She says her SOB started about 2 months ago after she had COVID  Went to ER last week - cardiac workup normal, Chest X Ray clear  Had echo, stress test yesterday - normal    9/12/2024  Seen a week ago for the above  In the interim had a home sleep study which showed moderate sleep apnea  Here for follow-up visit of the above  Using inhaler without benefit  6 minute walk test done today for review  Main complaint is of severe fatigue and dyspnea all the time    Review of Systems   Respiratory:  Positive for shortness of breath.     as per history of present illness otherwise negative  Objective:     Physical Exam   Constitutional: She is oriented to person, place, and time. She appears well-developed. She is obese.   HENT:   Head: Normocephalic.   Cardiovascular: Normal rate and regular rhythm.   Pulmonary/Chest: Normal expansion, symmetric chest wall expansion, effort normal and breath sounds normal.   Musculoskeletal:      Cervical back: Neck  supple.   Neurological: She is alert and oriented to person, place, and time.   Psychiatric:   Flat affect   Nursing note and vitals reviewed.         Assessment:     1. Obstructive sleep apnea    2. Dyspnea due to COVID-19         Orders Placed This Encounter   Procedures    CPAP/BIPAP SUPPLIES     Order Specific Question:   Length of need (1-99 months):     Answer:   99     Order Specific Question:   Choose ONE mask type and its corresponding cushions and/or pillows:     Answer:    Nasal Mask, 1 per 90 days:  Nasal Cushions, (6 per 90 days):  Nasal Pillows, (6 per 90 days)     Comments:   nasal cushions     Order Specific Question:   Choose EITHER Heated or Non-Heated Tubjing     Answer:    Heated Tubing, 1 per 6 months     Order Specific Question:   All other supplies as needed as listed below:     Answer:    Non-Disposable Filter, 1 per 180 days     Order Specific Question:   DME Agency:     Answer:   [facility]    CPAP FOR HOME USE     Order Specific Question:   Length of need (1-99 months):     Answer:   99     Order Specific Question:   Fulfillment Priority:     Answer:   Level 3:  AHI 15 to <30 with CDL or severe daytime sleepiness     Order Specific Question:   Type ():     Answer:   Auto CPAP     Order Specific Question:   Auto CPAP pressure setting range (cmH20):     Answer:   5-20     Order Specific Question:   Humidification ():     Answer:   Heated     Order Specific Question:   Choose ONE mask type and its corresponding cushions and/or pillows:     Answer:    Nasal Mask, 1 per 90 days:  Nasal Cushions, (6 per 90 days):  Nasal Pillows, (6 per 90 days)     Order Specific Question:   Choose EITHER Heated or Non-Heated Tubjing     Answer:    Non-Heated Tubing, 1 per 90 days     Order Specific Question:   All other supplies as needed as listed below:     Answer:    Disposable Filter, 6 per 90 days     Order Specific Question:    All other supplies as needed as listed below:     Answer:    Humidifier Chamber, 1 per 180 days     Order Specific Question:   DME Agency:     Answer:   [facility]     I personally reviewed a number of recently obtained studies including a CT of the chest which is fairly unremarkable, no acute pulmonary findings  Recent lab work showed mildly elevated CRP and sed rate otherwise normal  Recent home sleep study showed moderate sleep apnea obstructive type  6 minute walk test done today showed mild exercise intolerance with mild desaturation but a normal cardiovascular response to exercise  Recent echocardiogram and stress test essentially normal    Plan:     Dyspnea out of proportion to physiologic/objective findings  One treatable condition identified is obstructive sleep apnea  I did order auto titrating CPAP today  Discussed with her that she may have post COVID syndrome  Reassured her that we have identified no life-threatening conditions thus far  Discussed with her that it will just take some time for current issues to resolve if it is post COVID/long COVID  Encouraged her to continue to be as active as possible  Inhaled bronchodilators not likely to help  I will see her back in 3 months for close follow up of the above, sooner if needed

## 2024-09-12 NOTE — TELEPHONE ENCOUNTER
Called pt back. She says her PDM is not communicating with her pods. This has been occurring for the past 2-3 days and she has changed the pod out several times. She has also turned off the PDM to correct the issue. She is running high and giving insulin through her omnipod and manually giving injections every 3 hours with novolog. She is following sliding scale below    Novolog every 3 hours using correction scale outside of mealtime.  -200: 2 units  -250: 4 units  -300: 6 units  -350: 8 units  BG greater than 350: 10 units    She is not using any long acting insulin, but was ordered toujeo in the past as her pump back up plan. She ivory snot have any Toujeo at this time. Explained to patient she should call omnipod to let them know what errors she is experiencing with the PDM. I gave her the support number for omnipod. I pulled her [employer] report and uploaded it to media. Informed patent I would let the provider know what is going on. She is calling omnipod at this time. If long acting insulin is called in please call in to [facility] on [address]. I told her I would basim her back once provider replies with instructions.

## 2024-09-12 NOTE — PROCEDURES
ABG completed. See ABG Below.      Component      Latest Ref Rng 9/12/2024   POC PH      7.35 - 7.45  7.447    POC PCO2      35 - 45 mmHg 39.7    POC PO2      80 - 100 mmHg 92    POC HCO3      24 - 28 mmol/L 27.4    POC BE      -2 to 2 mmol/L 3 (H)    POC SATURATED O2      95 - 100 % 97    Sample ARTERIAL    Site LR    Allens Test Pass    DelSys Room Air    Mode SPONT    FiO2 21       Legend:  (H) High    Interpretation:  [] Arterial blood gases on room air demonstrate normal pCO2 and pO2  [] Arterial blood gases on room air are abnormal demonstrating hypercarbia (pCO2 >45 mmHg)  [] Arterial blood gases on room air are abnormal demonstrating hypocarbia (pCO2 < 35 mmHg)  [] Arterial blood gases on room air are abnormal demonstrating hypoxemia (pO2 < 80 mmHg)  [] Arterial blood gases on room air are abnormal demonstrating hyperoxemia (pO2 >120 mmHg)  [] Arterial blood gases on room air are abnormal demonstrating hypoxemia (pO2 < 80 mmHg) and hypercarbia (pCO2>45 mmHg)    The table below summarizes the main interpretations of the relationship between the arterial blood gases, pH, pCO2 and HCO-3    []    I

## 2024-09-12 NOTE — TELEPHONE ENCOUNTER
"Pt calls c/o hyperglycemia. She states that for the past week or so her blood sugars have been "out of control." Blood sugar is currently 296; highest was 320 at approx. 6pm.  Last took 6 units novolog at 6:40. Denies any symptoms at this time.     OCP, Dr. Martinez is called for further guidance. Pt is merged into call for Dr. Martinez to speak directly with pt. Pt reports the following insulin admins today: 0605- 2 units of novolog. 11- 5 units of novolog. 1330- 2 units of novolg. 1541-4 units of novolog. Dr. Martinez advises pt to take 2 more units of insulin to her sliding scale at this time and to f/u with diabetes management clinic once they are back in clinic for further adjustments.     Pt is instructed to call back with any new/worsening sxs, questions, or concerns. She verbalizes understanding.   Reason for Disposition   [1] Blood glucose > 300 mg/dL (16.7 mmol/L) AND [2] two or more times in a row    Additional Information   Negative: Unconscious or difficult to awaken   Negative: Acting confused (e.g., disoriented, slurred speech)   Negative: Very weak (e.g., can't stand)   Negative: Sounds like a life-threatening emergency to the triager   Negative: [1] Vomiting AND [2] signs of dehydration (e.g., very dry mouth, lightheaded, dark urine)   Negative: [1] Blood glucose > 240 mg/dL (13.3 mmol/L) AND [2] rapid breathing   Negative: Blood glucose > 500 mg/dL (27.8 mmol/L)   Negative: [1] Blood glucose > 240 mg/dL (13.3 mmol/L) AND [2] urine ketones moderate-large (or more than 1+)   Negative: [1] Blood glucose > 240 mg/dL (13.3 mmol/L) AND [2] blood ketones > 1.4 mmol/L   Negative: [1] Blood glucose > 240 mg/dL (13.3 mmol/L) AND [2] vomiting AND [3] unable to check for ketones (in blood or urine)   Negative: [1] New-onset diabetes suspected (e.g., frequent urination, weak, weight loss) AND [2] vomiting or rapid breathing   Negative: Vomiting lasts > 4 hours   Negative: Patient sounds very sick or weak to the " triager   Negative: Fever > 100.4 F (38.0 C)   Negative: Blood glucose > 400 mg/dL (22.2 mmol/L)    Protocols used: Diabetes - High Blood Sugar-A-AH

## 2024-09-12 NOTE — TELEPHONE ENCOUNTER
Outgoing call attempted x 2 to discuss patient question. Follow up message sent to patient via [facility] patient portal.    Awaiting patient response.

## 2024-09-17 ENCOUNTER — OFFICE VISIT (OUTPATIENT)
Dept: CARDIOLOGY | Facility: CLINIC | Age: 54
End: 2024-09-17
Payer: MEDICAID

## 2024-09-17 ENCOUNTER — LAB VISIT (OUTPATIENT)
Dept: LAB | Facility: HOSPITAL | Age: 54
End: 2024-09-17
Attending: INTERNAL MEDICINE
Payer: MEDICAID

## 2024-09-17 VITALS
BODY MASS INDEX: 46.26 KG/M2 | HEART RATE: 89 BPM | DIASTOLIC BLOOD PRESSURE: 80 MMHG | WEIGHT: 206.38 LBS | SYSTOLIC BLOOD PRESSURE: 125 MMHG | OXYGEN SATURATION: 98 %

## 2024-09-17 DIAGNOSIS — I51.7 TRANSIENT ISCHEMIC DILATION OF LEFT VENTRICLE OF HEART: ICD-10-CM

## 2024-09-17 DIAGNOSIS — I10 ESSENTIAL HYPERTENSION: Primary | ICD-10-CM

## 2024-09-17 DIAGNOSIS — E11.42 DIABETIC POLYNEUROPATHY ASSOCIATED WITH TYPE 2 DIABETES MELLITUS: ICD-10-CM

## 2024-09-17 DIAGNOSIS — R06.09 DOE (DYSPNEA ON EXERTION): ICD-10-CM

## 2024-09-17 DIAGNOSIS — Z53.1 BLOOD TRANSFUSION DECLINED BECAUSE PATIENT IS JEHOVAH'S WITNESS: ICD-10-CM

## 2024-09-17 DIAGNOSIS — R94.39 ABNORMAL STRESS TEST: ICD-10-CM

## 2024-09-17 DIAGNOSIS — R07.89 OTHER CHEST PAIN: ICD-10-CM

## 2024-09-17 DIAGNOSIS — F25.0 SCHIZOAFFECTIVE DISORDER, BIPOLAR TYPE: ICD-10-CM

## 2024-09-17 DIAGNOSIS — F31.9 BIPOLAR 1 DISORDER: ICD-10-CM

## 2024-09-17 DIAGNOSIS — F41.1 GENERALIZED ANXIETY DISORDER: ICD-10-CM

## 2024-09-17 DIAGNOSIS — K76.0 NAFLD (NONALCOHOLIC FATTY LIVER DISEASE): ICD-10-CM

## 2024-09-17 DIAGNOSIS — Z79.4 TYPE 2 DIABETES MELLITUS WITH HYPERGLYCEMIA, WITH LONG-TERM CURRENT USE OF INSULIN: ICD-10-CM

## 2024-09-17 DIAGNOSIS — E11.65 TYPE 2 DIABETES MELLITUS WITH HYPERGLYCEMIA, WITH LONG-TERM CURRENT USE OF INSULIN: ICD-10-CM

## 2024-09-17 LAB
ANION GAP SERPL CALC-SCNC: 7 MMOL/L (ref 8–16)
BASOPHILS # BLD AUTO: 0.01 K/UL (ref 0–0.2)
BASOPHILS NFR BLD: 0.1 % (ref 0–1.9)
BUN SERPL-MCNC: 7 MG/DL (ref 6–20)
CALCIUM SERPL-MCNC: 9.1 MG/DL (ref 8.7–10.5)
CHLORIDE SERPL-SCNC: 103 MMOL/L (ref 95–110)
CO2 SERPL-SCNC: 29 MMOL/L (ref 23–29)
CREAT SERPL-MCNC: 0.7 MG/DL (ref 0.5–1.4)
DIFFERENTIAL METHOD BLD: ABNORMAL
EOSINOPHIL # BLD AUTO: 0 K/UL (ref 0–0.5)
EOSINOPHIL NFR BLD: 0 % (ref 0–8)
ERYTHROCYTE [DISTWIDTH] IN BLOOD BY AUTOMATED COUNT: 15.4 % (ref 11.5–14.5)
EST. GFR  (NO RACE VARIABLE): >60 ML/MIN/1.73 M^2
GLUCOSE SERPL-MCNC: 101 MG/DL (ref 70–110)
HCT VFR BLD AUTO: 34.6 % (ref 37–48.5)
HGB BLD-MCNC: 10 G/DL (ref 12–16)
IMM GRANULOCYTES # BLD AUTO: 0.04 K/UL (ref 0–0.04)
IMM GRANULOCYTES NFR BLD AUTO: 0.6 % (ref 0–0.5)
INR PPP: 0.9 (ref 0.8–1.2)
LYMPHOCYTES # BLD AUTO: 2.1 K/UL (ref 1–4.8)
LYMPHOCYTES NFR BLD: 31.9 % (ref 18–48)
MCH RBC QN AUTO: 24.3 PG (ref 27–31)
MCHC RBC AUTO-ENTMCNC: 28.9 G/DL (ref 32–36)
MCV RBC AUTO: 84 FL (ref 82–98)
MONOCYTES # BLD AUTO: 0.5 K/UL (ref 0.3–1)
MONOCYTES NFR BLD: 7 % (ref 4–15)
NEUTROPHILS # BLD AUTO: 4 K/UL (ref 1.8–7.7)
NEUTROPHILS NFR BLD: 60.4 % (ref 38–73)
NRBC BLD-RTO: 0 /100 WBC
PLATELET # BLD AUTO: 374 K/UL (ref 150–450)
PMV BLD AUTO: 9.8 FL (ref 9.2–12.9)
POTASSIUM SERPL-SCNC: 4.3 MMOL/L (ref 3.5–5.1)
PROTHROMBIN TIME: 10.3 SEC (ref 9–12.5)
RBC # BLD AUTO: 4.11 M/UL (ref 4–5.4)
SODIUM SERPL-SCNC: 139 MMOL/L (ref 136–145)
WBC # BLD AUTO: 6.68 K/UL (ref 3.9–12.7)

## 2024-09-17 PROCEDURE — 80048 BASIC METABOLIC PNL TOTAL CA: CPT | Performed by: INTERNAL MEDICINE

## 2024-09-17 PROCEDURE — 99215 OFFICE O/P EST HI 40 MIN: CPT | Mod: PBBFAC | Performed by: INTERNAL MEDICINE

## 2024-09-17 PROCEDURE — 85025 COMPLETE CBC W/AUTO DIFF WBC: CPT | Performed by: INTERNAL MEDICINE

## 2024-09-17 PROCEDURE — 4010F ACE/ARB THERAPY RXD/TAKEN: CPT | Mod: CPTII,,, | Performed by: INTERNAL MEDICINE

## 2024-09-17 PROCEDURE — 1159F MED LIST DOCD IN RCRD: CPT | Mod: CPTII,,, | Performed by: INTERNAL MEDICINE

## 2024-09-17 PROCEDURE — 3079F DIAST BP 80-89 MM HG: CPT | Mod: CPTII,,, | Performed by: INTERNAL MEDICINE

## 2024-09-17 PROCEDURE — 36415 COLL VENOUS BLD VENIPUNCTURE: CPT | Performed by: INTERNAL MEDICINE

## 2024-09-17 PROCEDURE — 3008F BODY MASS INDEX DOCD: CPT | Mod: CPTII,,, | Performed by: INTERNAL MEDICINE

## 2024-09-17 PROCEDURE — 99214 OFFICE O/P EST MOD 30 MIN: CPT | Mod: S$PBB,,, | Performed by: INTERNAL MEDICINE

## 2024-09-17 PROCEDURE — 3044F HG A1C LEVEL LT 7.0%: CPT | Mod: CPTII,,, | Performed by: INTERNAL MEDICINE

## 2024-09-17 PROCEDURE — 85610 PROTHROMBIN TIME: CPT | Performed by: INTERNAL MEDICINE

## 2024-09-17 PROCEDURE — 3074F SYST BP LT 130 MM HG: CPT | Mod: CPTII,,, | Performed by: INTERNAL MEDICINE

## 2024-09-17 PROCEDURE — 99999 PR PBB SHADOW E&M-EST. PATIENT-LVL V: CPT | Mod: PBBFAC,,, | Performed by: INTERNAL MEDICINE

## 2024-09-17 NOTE — PROGRESS NOTES
"Subjective:   Patient ID:  Yolanda Betts is a 54 y.o. female who presents for follow up of Shortness of Breath and Chest Pain    4/22/2020  A 48 yo female with htn diabetes obesity o2 desaturation nocturnal o2 therapy migraine asthma is following on htn. Her bp is improved she is more complaint with slat diabetes and meds intake. The dose adjustment has helped with the bp buy taking extra 5 mg of amlodipine.  Has uti and had shortness of breath necessitating an er visits. Feels much better. Has  Less  shortness of breath she got hydrated. Has no leg edema her nebs has helped control symptoms has no other issues clinically.htn better control no fluctuation hr improved.      1/6/2021  Here for preop clearance for carpal tunnel. She is asymptomatic cardiac wise she works as  has no complaints of chest pain or shortness of breath had gallbladder surgery last year no complication has been dropping items from hand  Has numbness and tingling she needs bilateral carpal tunnel done . She is also looking at having bariatric surgery.      3/11/2021 YVES HUYNH  Ms. Betts is a 50 year old female patient whose current medical conditions include HTN, obesity, hypertriglyceridemia, DM type II, obesity, hypoventilation syndrome on nocturnal O2, and schizoaffective disorder who presents today for routine follow-up. She returns today and states she is doing clinicalli.y well. Main issue is still bilateral hand pain/tingling/nubmness. Scheduled for CTR in April, previously cleared by Dr. Mccormick. No change in status since last visit. No chest pain, heaviness, or tightness. No symptoms concerning for angina. No lightheadedness, dizziness, near syncope, or syncope. Does admit to occasional palpitations, notices it when she is stressed or anxious, not especially bothersome. No s/s suggestive of CHF. Does report getting winded if pollen is "bad outside". BP well-controlled. Patient is compliant with her " medications. Still works as an . Needs updated labs, orders placed.     9/15/2021   FEELS WELL COMPLIANT WITH MEDS DIET REVIEW OF DIGITAL; MEDICINE SHOWED GOOD CONTROL BP DIABETES. . MTZ SNO LEG SWELLING FORGETS TO USE O2 THERAPY CONTINOUSLY. HAS NO TIA CLAUDICVATION PALPITATIONS. HAS FLUCTUATION IN WEIGHT TRIES TO BE COMPLIANT TO LOSE WEIGHT.     3/16/2022  Here for f/u has had a sleeve  On adkin's diet has been losing weight has been exercising . Has been having swimming sensation in her head when lying down moving head or walking on treadmill. She has narcolepsy getting her sleep eval done this month she is on o2 therapy at [facility]. Has no other complaints her lipids are improved remarkably.     6/30/2022  Comes in for follow up   BP was elevated, meds adjusted by PCP   Feels well now , and BP controlled  LDL not at goal given her DM   Stable on follow up     1.5.2023  Comes in for a 6 months follow up   Doing mostly well   She denies any chest pain, no ivory, no orthopnea  She h/o anxiety, stressed out at work   She has bilateral ankle swelling , worse in the evening, she does a lot of sitting, she also takes amlodipine     7.10.2023  Comes in for a 6 months follow up   States bp also elevated at time at home   Was released from her job, states for mental illness and she is in the process of filing for disability   She is compliant with meds and diet   Mild ankles swelling in PM     August 8, 2024.    Comes in for a overdue follow-up.    Last time I put her on carvedilol instead of Toprol however she called the office stating that she could not tolerate it.  And went back on the Toprol.    Her blood pressure is still uncontrolled.    She has a Dexcom she uses an insulin pump.    She comes into the visit today I okay and she is on the examination table with the  back of the chair down.  She states that she is feeling very dizzy almost on a faint.  Been having chest pain with and without exertion to the  middle of the chest feels like a tightness and shortness of breath.  She states that she gained so much weight in the past 20 lb.    She looks in distress.  Her blood pressure and rest of the vitals are good however.  Her sugar is 200 on the Dexcom.    Unclear cause of her current symptoms.    She states that she does not feel safe going back home that way she is feeling and she lives by herself.    She was offered to go to the hospital via EMS and she agreed with that.    9.17.2024  She is here for follow-up.    She had a stress test after last visit which showed a TID 1.34  Known to have coronary calcium CT scan.    To have symptoms of chest pain and dyspnea.  Taking Toprol and amlodipine.  Past Medical History:   Diagnosis Date    Abnormal Pap smear of cervix     HPV genital warts    Anemia     Anxiety     Arthritis     Asthma 10/11/2016    Bipolar 1 disorder     Diabetes mellitus, type 2     Dyslipidemia associated with type 2 diabetes mellitus 05/13/2019    Dyspnea due to COVID-19 09/12/2024    General anesthetics causing adverse effect in therapeutic use     Genital warts     GERD (gastroesophageal reflux disease)     Herpes simplex virus (HSV) infection     Hyperlipidemia     Hypertension     Hypertension complicating diabetes 05/05/2019    Migraine with aura and without status migrainosus, not intractable 03/21/2016    Mild persistent asthma without complication 10/11/2016    Morbid obesity with body mass index (BMI) of 45.0 to 49.9 in adult 08/03/2017    Obstructive sleep apnea     ALEN (obstructive sleep apnea)     2L per N/C q HS    Schizoaffective disorder, bipolar type 04/25/2019    Seasonal allergic rhinitis due to pollen 05/13/2019    Type 2 diabetes mellitus with hyperglycemia, with long-term current use of insulin 12/21/2017    Type 2 diabetes mellitus with hyperglycemia, without long-term current use of insulin 12/21/2017       Past Surgical History:   Procedure Laterality Date    abcess removed  right back      BELT ABDOMINOPLASTY  1996    BREAST SURGERY Bilateral     Reduction    CARPAL TUNNEL RELEASE Bilateral 2023    Procedure: RELEASE, CARPAL TUNNEL;  Surgeon: Neville Rtoh MD;  Location: [facility] OR;  Service: Orthopedics;  Laterality: Bilateral;  bilateral carpal tunnel release     SECTION      X 2    COLONOSCOPY      COLONOSCOPY N/A 2018    Procedure: colonoscopy for iron deficiency anemia;  Surgeon: Frankie Sanchez MD;  Location: [facility];  Service: Endoscopy;  Laterality: N/A;    COLONOSCOPY N/A 2018    Procedure: COLONOSCOPY;  Surgeon: Frankie Sanchez MD;  Location: [facility];  Service: Endoscopy;  Laterality: N/A;    COLONOSCOPY N/A 3/10/2023    Procedure: COLONOSCOPY;  Surgeon: Lalita Montes De Oca MD;  Location: [facility];  Service: Endoscopy;  Laterality: N/A;    COLONOSCOPY N/A 2024    Procedure: COLONOSCOPY;  Surgeon: Tara Og MD;  Location: [facility];  Service: Endoscopy;  Laterality: N/A;    EPIDURAL STEROID INJECTION INTO CERVICAL SPINE N/A 2023    Procedure: C6/7 IL ROCAEL RN IV Sedation;  Surgeon: Otto Phillips MD;  Location: [facility];  Service: Pain Management;  Laterality: N/A;    EPIDURAL STEROID INJECTION INTO CERVICAL SPINE N/A 2024    Procedure: C5/6 IL ROCAEL;  Surgeon: Otto Phillips MD;  Location: [facility];  Service: Pain Management;  Laterality: N/A;    ESOPHAGOGASTRODUODENOSCOPY N/A 3/10/2023    Procedure: EGD (ESOPHAGOGASTRODUODENOSCOPY);  Surgeon: Lalita Montes De Oca MD;  Location: [facility];  Service: Endoscopy;  Laterality: N/A;    HYSTERECTOMY      w/ BSO; hypermenorrhea    INJECTION OF ANESTHETIC AGENT AROUND MEDIAL BRANCH NERVES INNERVATING CERVICAL FACET JOINT Bilateral 2023    Procedure: Bilateral C5-7 MBB (diagnostic);  Surgeon: Otto Phillips MD;  Location: [facility];  Service: Pain Management;  Laterality: Bilateral;    MOUTH SURGERY      OOPHORECTOMY      hyst/bso,  "hypermenorrhea    ROBOT-ASSISTED CHOLECYSTECTOMY USING DA DARI XI N/A 1/3/2020    Procedure: XI ROBOTIC CHOLECYSTECTOMY;  Surgeon: Damir Driscoll MD;  Location: [facility];  Service: General;  Laterality: N/A;    TOTAL REDUCTION MAMMOPLASTY      TUBAL LIGATION         Social History     Tobacco Use    Smoking status: Never    Smokeless tobacco: Never   Substance Use Topics    Alcohol use: Yes     Alcohol/week: 0.0 standard drinks of alcohol     Comment: socially  No alcohol 72h prior to sx    Drug use: No       Family History   Problem Relation Name Age of Onset    Diabetes Mother      Hyperlipidemia Mother      Hypertension Mother      Asthma Mother      COPD Mother      Glaucoma Mother      Thyroid disease Mother      Anesthesia problems Mother          "almost had a cardiac arrest" , blood clots    Hypertension Father      Hyperlipidemia Father      Cancer Father          Brain, lung, liver, kidney    No Known Problems Sister      Hypertension Brother      Alcohol abuse Brother      Heart disease Maternal Grandmother      Hyperlipidemia Maternal Grandmother      Hypertension Maternal Grandmother      Cataracts Maternal Grandmother      Diabetes Maternal Grandmother      Heart disease Maternal Grandfather      Hyperlipidemia Maternal Grandfather      Hypertension Maternal Grandfather      Glaucoma Maternal Grandfather      Cancer Maternal Grandfather      Cataracts Maternal Grandfather      Macular degeneration Maternal Grandfather      Diabetes Maternal Grandfather      Heart disease Paternal Grandmother      Hyperlipidemia Paternal Grandmother      Hypertension Paternal Grandmother      Cataracts Paternal Grandmother      Heart disease Paternal Grandfather      Hyperlipidemia Paternal Grandfather      Hypertension Paternal Grandfather      Cataracts Paternal Grandfather      Breast cancer Maternal Cousin      Breast cancer Maternal Cousin      Breast cancer Maternal Cousin      Breast cancer Maternal Cousin   " "      Current Outpatient Medications   Medication Sig    albuterol (PROVENTIL) 2.5 mg /3 mL (0.083 %) nebulizer solution Take 2.5 mg by nebulization every 4 (four) hours as needed. Times a day    albuterol (PROVENTIL/VENTOLIN HFA) 90 mcg/actuation inhaler Inhale 2 puffs into the lungs every 6 (six) hours as needed for Wheezing.    ALPRAZolam (XANAX) 1 MG tablet Take 1 tablet (1 mg total) by mouth 2 (two) times daily as needed for Anxiety.    amlodipine-valsartan (EXFORGE)  mg per tablet Take 1 tablet by mouth once daily.    amlodipine-valsartan (EXFORGE)  mg per tablet Take 1 tablet by mouth in the morning.    amlodipine-valsartan (EXFORGE)  mg per tablet Take 1 tablet by mouth every morning    atorvastatin (LIPITOR) 20 MG tablet Take 1 tablet (20 mg total) by mouth every evening    azelastine (ASTELIN) 137 mcg (0.1 %) nasal spray 2 sprays (274 mcg total) by Nasal route 2 (two) times daily.    BD INSULIN SYRINGE ULTRA-FINE 1/2 mL 30 gauge x 1/2" Syrg     blood-glucose sensor (DEXCOM G6 SENSOR) Mariela change sensor every 10 days.    blood-glucose sensor (DEXCOM G7 SENSOR) Mariela Use to check bg. Change every 10 days    blood-glucose transmitter (DEXCOM G6 TRANSMITTER) Mariela 1 each by Misc.(Non-Drug; Combo Route) route every 3 (three) months.    blood-glucose transmitter (DEXCOM G6 TRANSMITTER) Mariela Use as directed change every 3 months    celecoxib (CELEBREX) 100 MG capsule Take 1 capsule (100 mg total) by mouth 2 (two) times daily.    cyclobenzaprine (FLEXERIL) 10 MG tablet Take 1 tablet (10 mg total) by mouth 3 (three) times daily as needed (Pain).    DULoxetine (CYMBALTA) 60 MG capsule Take 1 capsule by mouth 2 times daily    ergocalciferol (VITAMIN D2) 50,000 unit Cap Take 1 capsule twice weekly for 3 weeks then weekly    estradioL (ESTRACE) 0.01 % (0.1 mg/gram) vaginal cream Place 1 g vaginally twice a week.    frovatriptan (FROVA) 2.5 MG tablet Take 2.5 mg by mouth daily as needed.    furosemide " "(LASIX) 20 MG tablet Take 1 tablet (20 mg total) by mouth once daily.    glucagon (BAQSIMI) 3 mg/actuation Spry 1 spray by Nasal route as needed (hypoglycemia). For emergency use. May repeat 1 time after 15 minutes    glucagon (BAQSIMI) 3 mg/actuation [place] SPRAY 1 SPRAY IN NOSTRIL AS NEEDED FOR EMERGENCY. MAY REPEAT 1 TIME AFTER 15 MINUTES    insulin aspart U-100 (NOVOLOG U-100 INSULIN ASPART) 100 unit/mL injection To use via insulin pump. Up to 200 units every 3 days    insulin aspart U-100 (NOVOLOG) 100 unit/mL injection NovoLOG FlexPen    insulin glargine U-100, Lantus, (LANTUS SOLOSTAR U-100 INSULIN) 100 unit/mL (3 mL) InPn pen Inject up to 50 units daily in event of pump failure    insulin pump cart,automated,BT (OMNIPOD 5 G6 PODS, GEN 5,) Crtg Inject into skin every 3 days as directed    insulin syringe-needle U-100 0.3 mL 30 gauge x 5/16" Syrg 1 each by Misc.(Non-Drug; Combo Route) route Every 3 (three) days.    lamoTRIgine (LAMICTAL) 200 MG tablet Take 1 tablet (200 mg total) by mouth 2 (two) times daily.    levocetirizine (XYZAL) 5 MG tablet Take 1 tablet (5 mg total) by mouth every evening.    LIDOcaine (LIDODERM) 5 % Place 2 patches onto the skin every 12 (twelve) hours as needed (pain). Remove & Discard patch within 12 hours or as directed by MD    lifitegrast (XIIDRA) 5 % Dpet Place 1 drop into both eyes 2 (two) times daily.    lumateperone (CAPLYTA) 10.5 mg Cap Take 1 capsule (10.5 mg total) by mouth once daily.    magic mouthwash diphen/antac/lidoc/nysta Swish and spit 5 mLs every 4 (four) hours as needed.    magnesium hydroxide 400 mg/5 ml (MILK OF MAGNESIA) 400 mg/5 mL Susp Take 5 mLs by mouth every 4 (four) hours as needed.    magnesium oxide (MAG-OX) 400 mg (241.3 mg magnesium) tablet Take 1 tablet (400 mg total) by mouth once daily.    methylPREDNISolone (MEDROL DOSEPACK) 4 mg tablet use as directed    metoprolol succinate (TOPROL-XL) 50 MG 24 hr tablet Take 1 tablet (50 mg total) by mouth every " "morning.    nebulizer and compressor (COMP-AIR NEBULIZER COMPRESSOR) Mariela use as directed    omeprazole (PRILOSEC) 40 MG capsule Take 1 capsule (40 mg total) by mouth once daily.    OZEMPIC 2 mg/dose (8 mg/3 mL) PnIj Inject 2 mg into the skin every 7 days.    pen needle, diabetic (BD ULTRA-FINE MINI PEN NEEDLE) 31 gauge x 3/16" Ndle Use 1 a day in event of pump failure    polyethylene glycol (GLYCOLAX) 17 gram/dose powder Take 17 g by mouth 2 (two) times daily.    promethazine (PHENERGAN) 25 MG tablet Take 25 mg by mouth every 6 (six) hours as needed.    secukinumab (COSENTYX PEN, 2 PENS,) 150 mg/mL PnIj Inject 300 mg into the skin every 14 (fourteen) days.    semaglutide (OZEMPIC) 2 mg/dose (8 mg/3 mL) PnIj Inject 2 mg into the skin every 7 days.    SYMBICORT 160-4.5 mcg/actuation HFAA Inhale 2 puffs into the lungs in the morning and 2 puffs before bedtime. 2 puffs every 12 hours    triamcinolone acetonide 0.1% (KENALOG) 0.1 % paste Apply coating 2 times daily    zolpidem (AMBIEN) 5 MG Tab Take 1 tablet (5 mg total) by mouth nightly as needed (anxiety).    blood-glucose meter,continuous (DEXCOM G7 ) Misc 1 each by Misc.(Non-Drug; Combo Route) route once. for 1 dose    fenofibrate (TRICOR) 145 MG tablet Take 1 tablet (145 mg total) by mouth once daily.     No current facility-administered medications for this visit.     Facility-Administered Medications Ordered in Other Visits   Medication    ondansetron injection 4 mg     Current Outpatient Medications on File Prior to Visit   Medication Sig    albuterol (PROVENTIL) 2.5 mg /3 mL (0.083 %) nebulizer solution Take 2.5 mg by nebulization every 4 (four) hours as needed. Times a day    albuterol (PROVENTIL/VENTOLIN HFA) 90 mcg/actuation inhaler Inhale 2 puffs into the lungs every 6 (six) hours as needed for Wheezing.    ALPRAZolam (XANAX) 1 MG tablet Take 1 tablet (1 mg total) by mouth 2 (two) times daily as needed for Anxiety.    amlodipine-valsartan (EXFORGE) " " mg per tablet Take 1 tablet by mouth once daily.    amlodipine-valsartan (EXFORGE)  mg per tablet Take 1 tablet by mouth in the morning.    amlodipine-valsartan (EXFORGE)  mg per tablet Take 1 tablet by mouth every morning    atorvastatin (LIPITOR) 20 MG tablet Take 1 tablet (20 mg total) by mouth every evening    azelastine (ASTELIN) 137 mcg (0.1 %) nasal spray 2 sprays (274 mcg total) by Nasal route 2 (two) times daily.    BD INSULIN SYRINGE ULTRA-FINE 1/2 mL 30 gauge x 1/2" Syrg     blood-glucose sensor (DEXCOM G6 SENSOR) Mariela change sensor every 10 days.    blood-glucose sensor (DEXCOM G7 SENSOR) Mariela Use to check bg. Change every 10 days    blood-glucose transmitter (DEXCOM G6 TRANSMITTER) Mariela 1 each by Misc.(Non-Drug; Combo Route) route every 3 (three) months.    blood-glucose transmitter (DEXCOM G6 TRANSMITTER) Mariela Use as directed change every 3 months    celecoxib (CELEBREX) 100 MG capsule Take 1 capsule (100 mg total) by mouth 2 (two) times daily.    cyclobenzaprine (FLEXERIL) 10 MG tablet Take 1 tablet (10 mg total) by mouth 3 (three) times daily as needed (Pain).    DULoxetine (CYMBALTA) 60 MG capsule Take 1 capsule by mouth 2 times daily    ergocalciferol (VITAMIN D2) 50,000 unit Cap Take 1 capsule twice weekly for 3 weeks then weekly    estradioL (ESTRACE) 0.01 % (0.1 mg/gram) vaginal cream Place 1 g vaginally twice a week.    frovatriptan (FROVA) 2.5 MG tablet Take 2.5 mg by mouth daily as needed.    furosemide (LASIX) 20 MG tablet Take 1 tablet (20 mg total) by mouth once daily.    glucagon (BAQSIMI) 3 mg/actuation Spry 1 spray by Nasal route as needed (hypoglycemia). For emergency use. May repeat 1 time after 15 minutes    glucagon (BAQSIMI) 3 mg/actuation [place] SPRAY 1 SPRAY IN NOSTRIL AS NEEDED FOR EMERGENCY. MAY REPEAT 1 TIME AFTER 15 MINUTES    insulin aspart U-100 (NOVOLOG U-100 INSULIN ASPART) 100 unit/mL injection To use via insulin pump. Up to 200 units every 3 days    " "insulin aspart U-100 (NOVOLOG) 100 unit/mL injection NovoLOG FlexPen    insulin glargine U-100, Lantus, (LANTUS SOLOSTAR U-100 INSULIN) 100 unit/mL (3 mL) InPn pen Inject up to 50 units daily in event of pump failure    insulin pump cart,automated,BT (OMNIPOD 5 G6 PODS, GEN 5,) Crtg Inject into skin every 3 days as directed    insulin syringe-needle U-100 0.3 mL 30 gauge x 5/16" Syrg 1 each by Misc.(Non-Drug; Combo Route) route Every 3 (three) days.    lamoTRIgine (LAMICTAL) 200 MG tablet Take 1 tablet (200 mg total) by mouth 2 (two) times daily.    levocetirizine (XYZAL) 5 MG tablet Take 1 tablet (5 mg total) by mouth every evening.    LIDOcaine (LIDODERM) 5 % Place 2 patches onto the skin every 12 (twelve) hours as needed (pain). Remove & Discard patch within 12 hours or as directed by MD    lifitegrast (XIIDRA) 5 % Dpet Place 1 drop into both eyes 2 (two) times daily.    lumateperone (CAPLYTA) 10.5 mg Cap Take 1 capsule (10.5 mg total) by mouth once daily.    magic mouthwash diphen/antac/lidoc/nysta Swish and spit 5 mLs every 4 (four) hours as needed.    magnesium hydroxide 400 mg/5 ml (MILK OF MAGNESIA) 400 mg/5 mL Susp Take 5 mLs by mouth every 4 (four) hours as needed.    magnesium oxide (MAG-OX) 400 mg (241.3 mg magnesium) tablet Take 1 tablet (400 mg total) by mouth once daily.    methylPREDNISolone (MEDROL DOSEPACK) 4 mg tablet use as directed    metoprolol succinate (TOPROL-XL) 50 MG 24 hr tablet Take 1 tablet (50 mg total) by mouth every morning.    nebulizer and compressor (COMP-AIR NEBULIZER COMPRESSOR) Mariela use as directed    omeprazole (PRILOSEC) 40 MG capsule Take 1 capsule (40 mg total) by mouth once daily.    OZEMPIC 2 mg/dose (8 mg/3 mL) PnIj Inject 2 mg into the skin every 7 days.    pen needle, diabetic (BD ULTRA-FINE MINI PEN NEEDLE) 31 gauge x 3/16" Ndle Use 1 a day in event of pump failure    polyethylene glycol (GLYCOLAX) 17 gram/dose powder Take 17 g by mouth 2 (two) times daily.    " promethazine (PHENERGAN) 25 MG tablet Take 25 mg by mouth every 6 (six) hours as needed.    secukinumab (COSENTYX PEN, 2 PENS,) 150 mg/mL PnIj Inject 300 mg into the skin every 14 (fourteen) days.    semaglutide (OZEMPIC) 2 mg/dose (8 mg/3 mL) PnIj Inject 2 mg into the skin every 7 days.    SYMBICORT 160-4.5 mcg/actuation HFAA Inhale 2 puffs into the lungs in the morning and 2 puffs before bedtime. 2 puffs every 12 hours    triamcinolone acetonide 0.1% (KENALOG) 0.1 % paste Apply coating 2 times daily    zolpidem (AMBIEN) 5 MG Tab Take 1 tablet (5 mg total) by mouth nightly as needed (anxiety).    blood-glucose meter,continuous (DEXCOM G7 ) Misc 1 each by Misc.(Non-Drug; Combo Route) route once. for 1 dose    fenofibrate (TRICOR) 145 MG tablet Take 1 tablet (145 mg total) by mouth once daily.    [DISCONTINUED] clonazePAM (KLONOPIN) 1 MG tablet Take 1 tablet by mouth daily    [DISCONTINUED] glucagon, human recombinant, (GLUCAGON EMERGENCY KIT, HUMAN,) 1 mg SolR Inject 1 mg into the muscle as needed. Emergency use    [DISCONTINUED] valsartan (DIOVAN) 320 MG tablet Take 1 tablet (320 mg total) by mouth once daily.     Current Facility-Administered Medications on File Prior to Visit   Medication    ondansetron injection 4 mg     Review of patient's allergies indicates:   Allergen Reactions    Codeine Itching    Hydromorphone Other (See Comments)     Can't wake up for long time if taken    Slow to wake up after surgery after receiving    Aleve [naproxen sodium]      Increases BP    Ibuprofen      Increases BP    Neuromuscular blockers, steroidal Hives     some    Pregabalin Itching    Latex, natural rubber Rash    Morphine Rash     itching    Norco [hydrocodone-acetaminophen] Itching, Rash and Hallucinations    Secobarbital sodium Rash     itching    Tylox [oxycodone-acetaminophen] Rash       Review of Systems   Cardiovascular:  Positive for chest pain and palpitations. Negative for syncope.   Respiratory:   Positive for shortness of breath.    Gastrointestinal:  Positive for heartburn.   Genitourinary: Negative.    Neurological:  Positive for dizziness.       Objective:   Physical Exam  Vitals reviewed.   Constitutional:       Appearance: She is well-developed.   Neck:      Vascular: No carotid bruit.   Cardiovascular:      Rate and Rhythm: Normal rate and regular rhythm.      Pulses: Intact distal pulses.      Heart sounds: Normal heart sounds. No murmur heard.  Pulmonary:      Breath sounds: Normal breath sounds.   Neurological:      Mental Status: She is oriented to person, place, and time.       Vitals:    09/17/24 1114   BP: 125/80   BP Location: Left arm   Patient Position: Sitting   BP Method: Large (Manual)   Pulse: 89   SpO2: 98%   Weight: 93.6 kg (206 lb 5.6 oz)     Lab Results   Component Value Date    CHOL 209 (H) 04/29/2024    CHOL 107 (L) 08/08/2023    CHOL 131 09/21/2022     Lab Results   Component Value Date    HDL 65 04/29/2024    HDL 44 08/08/2023    HDL 38 (L) 09/21/2022     Lab Results   Component Value Date    LDLCALC 123 (H) 04/29/2024    LDLCALC 43.4 (L) 08/08/2023    LDLCALC 43.8 (L) 09/21/2022     Lab Results   Component Value Date    TRIG 117 04/29/2024    TRIG 98 08/08/2023    TRIG 246 (H) 09/21/2022     Lab Results   Component Value Date    CHOLHDL 41.1 08/08/2023    CHOLHDL 29.0 09/21/2022    CHOLHDL 20.3 06/24/2022       Chemistry        Component Value Date/Time     07/30/2024 1043    K 4.2 07/30/2024 1043     07/30/2024 1043    CO2 25 07/30/2024 1043    BUN 9 07/30/2024 1043    CREATININE 0.7 07/30/2024 1043     07/30/2024 1043        Component Value Date/Time    CALCIUM 9.3 07/30/2024 1043    ALKPHOS 80 07/30/2024 1043    AST 12 07/30/2024 1043    ALT 12 07/30/2024 1043    BILITOT 0.2 07/30/2024 1043    ESTGFRAFRICA >60.0 03/08/2022 0735    EGFRNONAA >60.0 03/08/2022 0735          Lab Results   Component Value Date    TSH 2.360 04/29/2024     Lab Results   Component  Value Date    INR 1.0 06/26/2020    INR 0.9 11/23/2016     Lab Results   Component Value Date    WBC 6.23 07/30/2024    HGB 9.9 (L) 07/30/2024    HCT 35.3 (L) 07/30/2024    MCV 88 07/30/2024     07/30/2024     BMP  Sodium   Date Value Ref Range Status   07/30/2024 139 136 - 145 mmol/L Final     Potassium   Date Value Ref Range Status   07/30/2024 4.2 3.5 - 5.1 mmol/L Final     Chloride   Date Value Ref Range Status   07/30/2024 106 95 - 110 mmol/L Final     CO2   Date Value Ref Range Status   07/30/2024 25 23 - 29 mmol/L Final     BUN   Date Value Ref Range Status   07/30/2024 9 6 - 20 mg/dL Final     Creatinine   Date Value Ref Range Status   07/30/2024 0.7 0.5 - 1.4 mg/dL Final     Calcium   Date Value Ref Range Status   07/30/2024 9.3 8.7 - 10.5 mg/dL Final     Anion Gap   Date Value Ref Range Status   07/30/2024 8 8 - 16 mmol/L Final     eGFR if    Date Value Ref Range Status   03/08/2022 >60.0 >60 mL/min/1.73 m^2 Final     eGFR if non    Date Value Ref Range Status   03/08/2022 >60.0 >60 mL/min/1.73 m^2 Final     Comment:     Calculation used to obtain the estimated glomerular filtration  rate (eGFR) is the CKD-EPI equation.        CrCl cannot be calculated (Patient's most recent lab result is older than the maximum 7 days allowed.).  Lab Results   Component Value Date    HGBA1C 6.6 (H) 06/27/2024     Results for orders placed during the hospital encounter of 09/03/24    Nuclear Stress - Cardiology Interpreted    Interpretation Summary    Normal myocardial perfusion scan. There is no evidence of myocardial ischemia or infarction.    There is a mild intensity perfusion abnormality in the anterior wall of the left ventricle, secondary to breast attenuation.    The gated perfusion images showed an ejection fraction of 64% at rest. The gated perfusion images showed an ejection fraction of 70% post stress.    The ECG portion of the study is negative for ischemia.    The patient  reported no chest pain during the stress test.    There were no arrhythmias during stress.    TID score 1.34      Assessment:   Essential hypertension    Other chest pain    Abnormal stress test  -     CBC Auto Differential; Future; Expected date: 09/17/2024  -     Basic metabolic panel; Future; Expected date: 09/17/2024  -     Protime-INR; Future; Expected date: 09/17/2024    Transient ischemic dilation of left ventricle of heart  -     CBC Auto Differential; Future; Expected date: 09/17/2024  -     Basic metabolic panel; Future; Expected date: 09/17/2024  -     Protime-INR; Future; Expected date: 09/17/2024    COATS (dyspnea on exertion)    Diabetic polyneuropathy associated with type 2 diabetes mellitus    Schizoaffective disorder, bipolar type    Bipolar 1 disorder    Generalized anxiety disorder    Type 2 diabetes mellitus with hyperglycemia, with long-term current use of insulin    NAFLD (nonalcoholic fatty liver disease)    Blood transfusion declined because patient is Congregation            Plan:   Continue with Toprol amlodipine and valsartan.    Reviewed abnormal stress test results with the patient in the light also of coronary calcification seen on CT scan we will recommend a left heart catheterization out triple-vessel disease or left main disease given her TID.  Cardiac low salt diet.  Risk factor modification and excercise program.  Reviewed current meds as above  Continue with statins  Compression stockings  Still recommended   Reviewed all tests and above medical conditions with patient in detail and formulated treatment plan.  Risk factor modification discussed.   Cardiac low salt diet discussed.  Considering agreeable.  Risks and alternatives explained to the patient care she is a [employer] and refuses blood transfusion.  RTC after heart catheterization

## 2024-09-17 NOTE — H&P (VIEW-ONLY)
"Subjective:   Patient ID:  Yolanda Betts is a 54 y.o. female who presents for follow up of Shortness of Breath and Chest Pain    4/22/2020  A 48 yo female with htn diabetes obesity o2 desaturation nocturnal o2 therapy migraine asthma is following on htn. Her bp is improved she is more complaint with slat diabetes and meds intake. The dose adjustment has helped with the bp buy taking extra 5 mg of amlodipine.  Has uti and had shortness of breath necessitating an er visits. Feels much better. Has  Less  shortness of breath she got hydrated. Has no leg edema her nebs has helped control symptoms has no other issues clinically.htn better control no fluctuation hr improved.      1/6/2021  Here for preop clearance for carpal tunnel. She is asymptomatic cardiac wise she works as  has no complaints of chest pain or shortness of breath had gallbladder surgery last year no complication has been dropping items from hand  Has numbness and tingling she needs bilateral carpal tunnel done . She is also looking at having bariatric surgery.      3/11/2021 YVES HUYNH  Ms. Betts is a 50 year old female patient whose current medical conditions include HTN, obesity, hypertriglyceridemia, DM type II, obesity, hypoventilation syndrome on nocturnal O2, and schizoaffective disorder who presents today for routine follow-up. She returns today and states she is doing clinicalli.y well. Main issue is still bilateral hand pain/tingling/nubmness. Scheduled for CTR in April, previously cleared by Dr. Mccormick. No change in status since last visit. No chest pain, heaviness, or tightness. No symptoms concerning for angina. No lightheadedness, dizziness, near syncope, or syncope. Does admit to occasional palpitations, notices it when she is stressed or anxious, not especially bothersome. No s/s suggestive of CHF. Does report getting winded if pollen is "bad outside". BP well-controlled. Patient is compliant with her " medications. Still works as an . Needs updated labs, orders placed.     9/15/2021   FEELS WELL COMPLIANT WITH MEDS DIET REVIEW OF DIGITAL; MEDICINE SHOWED GOOD CONTROL BP DIABETES. . MTZ SNO LEG SWELLING FORGETS TO USE O2 THERAPY CONTINOUSLY. HAS NO TIA CLAUDICVATION PALPITATIONS. HAS FLUCTUATION IN WEIGHT TRIES TO BE COMPLIANT TO LOSE WEIGHT.     3/16/2022  Here for f/u has had a sleeve  On adkin's diet has been losing weight has been exercising . Has been having swimming sensation in her head when lying down moving head or walking on treadmill. She has narcolepsy getting her sleep eval done this month she is on o2 therapy at [facility]. Has no other complaints her lipids are improved remarkably.     6/30/2022  Comes in for follow up   BP was elevated, meds adjusted by PCP   Feels well now , and BP controlled  LDL not at goal given her DM   Stable on follow up     1.5.2023  Comes in for a 6 months follow up   Doing mostly well   She denies any chest pain, no ivory, no orthopnea  She h/o anxiety, stressed out at work   She has bilateral ankle swelling , worse in the evening, she does a lot of sitting, she also takes amlodipine     7.10.2023  Comes in for a 6 months follow up   States bp also elevated at time at home   Was released from her job, states for mental illness and she is in the process of filing for disability   She is compliant with meds and diet   Mild ankles swelling in PM     August 8, 2024.    Comes in for a overdue follow-up.    Last time I put her on carvedilol instead of Toprol however she called the office stating that she could not tolerate it.  And went back on the Toprol.    Her blood pressure is still uncontrolled.    She has a Dexcom she uses an insulin pump.    She comes into the visit today I okay and she is on the examination table with the  back of the chair down.  She states that she is feeling very dizzy almost on a faint.  Been having chest pain with and without exertion to the  middle of the chest feels like a tightness and shortness of breath.  She states that she gained so much weight in the past 20 lb.    She looks in distress.  Her blood pressure and rest of the vitals are good however.  Her sugar is 200 on the Dexcom.    Unclear cause of her current symptoms.    She states that she does not feel safe going back home that way she is feeling and she lives by herself.    She was offered to go to the hospital via EMS and she agreed with that.    9.17.2024  She is here for follow-up.    She had a stress test after last visit which showed a TID 1.34  Known to have coronary calcium CT scan.    To have symptoms of chest pain and dyspnea.  Taking Toprol and amlodipine.  Past Medical History:   Diagnosis Date    Abnormal Pap smear of cervix     HPV genital warts    Anemia     Anxiety     Arthritis     Asthma 10/11/2016    Bipolar 1 disorder     Diabetes mellitus, type 2     Dyslipidemia associated with type 2 diabetes mellitus 05/13/2019    Dyspnea due to COVID-19 09/12/2024    General anesthetics causing adverse effect in therapeutic use     Genital warts     GERD (gastroesophageal reflux disease)     Herpes simplex virus (HSV) infection     Hyperlipidemia     Hypertension     Hypertension complicating diabetes 05/05/2019    Migraine with aura and without status migrainosus, not intractable 03/21/2016    Mild persistent asthma without complication 10/11/2016    Morbid obesity with body mass index (BMI) of 45.0 to 49.9 in adult 08/03/2017    Obstructive sleep apnea     ALEN (obstructive sleep apnea)     2L per N/C q HS    Schizoaffective disorder, bipolar type 04/25/2019    Seasonal allergic rhinitis due to pollen 05/13/2019    Type 2 diabetes mellitus with hyperglycemia, with long-term current use of insulin 12/21/2017    Type 2 diabetes mellitus with hyperglycemia, without long-term current use of insulin 12/21/2017       Past Surgical History:   Procedure Laterality Date    abcess removed  right back      BELT ABDOMINOPLASTY  1996    BREAST SURGERY Bilateral     Reduction    CARPAL TUNNEL RELEASE Bilateral 2023    Procedure: RELEASE, CARPAL TUNNEL;  Surgeon: Neville Roth MD;  Location: [facility] OR;  Service: Orthopedics;  Laterality: Bilateral;  bilateral carpal tunnel release     SECTION      X 2    COLONOSCOPY      COLONOSCOPY N/A 2018    Procedure: colonoscopy for iron deficiency anemia;  Surgeon: Frankie Sanchez MD;  Location: [facility];  Service: Endoscopy;  Laterality: N/A;    COLONOSCOPY N/A 2018    Procedure: COLONOSCOPY;  Surgeon: Frankie Sanchez MD;  Location: [facility];  Service: Endoscopy;  Laterality: N/A;    COLONOSCOPY N/A 3/10/2023    Procedure: COLONOSCOPY;  Surgeon: Lalita Montes De Oca MD;  Location: [facility];  Service: Endoscopy;  Laterality: N/A;    COLONOSCOPY N/A 2024    Procedure: COLONOSCOPY;  Surgeon: Tara Og MD;  Location: [facility];  Service: Endoscopy;  Laterality: N/A;    EPIDURAL STEROID INJECTION INTO CERVICAL SPINE N/A 2023    Procedure: C6/7 IL ROCAEL RN IV Sedation;  Surgeon: Otto Phillips MD;  Location: [facility];  Service: Pain Management;  Laterality: N/A;    EPIDURAL STEROID INJECTION INTO CERVICAL SPINE N/A 2024    Procedure: C5/6 IL ROCAEL;  Surgeon: Otto Phillips MD;  Location: [facility];  Service: Pain Management;  Laterality: N/A;    ESOPHAGOGASTRODUODENOSCOPY N/A 3/10/2023    Procedure: EGD (ESOPHAGOGASTRODUODENOSCOPY);  Surgeon: Lalita Montes De Oca MD;  Location: [facility];  Service: Endoscopy;  Laterality: N/A;    HYSTERECTOMY      w/ BSO; hypermenorrhea    INJECTION OF ANESTHETIC AGENT AROUND MEDIAL BRANCH NERVES INNERVATING CERVICAL FACET JOINT Bilateral 2023    Procedure: Bilateral C5-7 MBB (diagnostic);  Surgeon: Otto Phillips MD;  Location: [facility];  Service: Pain Management;  Laterality: Bilateral;    MOUTH SURGERY      OOPHORECTOMY      hyst/bso,  "hypermenorrhea    ROBOT-ASSISTED CHOLECYSTECTOMY USING DA DARI XI N/A 1/3/2020    Procedure: XI ROBOTIC CHOLECYSTECTOMY;  Surgeon: Damir Driscoll MD;  Location: [facility];  Service: General;  Laterality: N/A;    TOTAL REDUCTION MAMMOPLASTY      TUBAL LIGATION         Social History     Tobacco Use    Smoking status: Never    Smokeless tobacco: Never   Substance Use Topics    Alcohol use: Yes     Alcohol/week: 0.0 standard drinks of alcohol     Comment: socially  No alcohol 72h prior to sx    Drug use: No       Family History   Problem Relation Name Age of Onset    Diabetes Mother      Hyperlipidemia Mother      Hypertension Mother      Asthma Mother      COPD Mother      Glaucoma Mother      Thyroid disease Mother      Anesthesia problems Mother          "almost had a cardiac arrest" , blood clots    Hypertension Father      Hyperlipidemia Father      Cancer Father          Brain, lung, liver, kidney    No Known Problems Sister      Hypertension Brother      Alcohol abuse Brother      Heart disease Maternal Grandmother      Hyperlipidemia Maternal Grandmother      Hypertension Maternal Grandmother      Cataracts Maternal Grandmother      Diabetes Maternal Grandmother      Heart disease Maternal Grandfather      Hyperlipidemia Maternal Grandfather      Hypertension Maternal Grandfather      Glaucoma Maternal Grandfather      Cancer Maternal Grandfather      Cataracts Maternal Grandfather      Macular degeneration Maternal Grandfather      Diabetes Maternal Grandfather      Heart disease Paternal Grandmother      Hyperlipidemia Paternal Grandmother      Hypertension Paternal Grandmother      Cataracts Paternal Grandmother      Heart disease Paternal Grandfather      Hyperlipidemia Paternal Grandfather      Hypertension Paternal Grandfather      Cataracts Paternal Grandfather      Breast cancer Maternal Cousin      Breast cancer Maternal Cousin      Breast cancer Maternal Cousin      Breast cancer Maternal Cousin   " "      Current Outpatient Medications   Medication Sig    albuterol (PROVENTIL) 2.5 mg /3 mL (0.083 %) nebulizer solution Take 2.5 mg by nebulization every 4 (four) hours as needed. Times a day    albuterol (PROVENTIL/VENTOLIN HFA) 90 mcg/actuation inhaler Inhale 2 puffs into the lungs every 6 (six) hours as needed for Wheezing.    ALPRAZolam (XANAX) 1 MG tablet Take 1 tablet (1 mg total) by mouth 2 (two) times daily as needed for Anxiety.    amlodipine-valsartan (EXFORGE)  mg per tablet Take 1 tablet by mouth once daily.    amlodipine-valsartan (EXFORGE)  mg per tablet Take 1 tablet by mouth in the morning.    amlodipine-valsartan (EXFORGE)  mg per tablet Take 1 tablet by mouth every morning    atorvastatin (LIPITOR) 20 MG tablet Take 1 tablet (20 mg total) by mouth every evening    azelastine (ASTELIN) 137 mcg (0.1 %) nasal spray 2 sprays (274 mcg total) by Nasal route 2 (two) times daily.    BD INSULIN SYRINGE ULTRA-FINE 1/2 mL 30 gauge x 1/2" Syrg     blood-glucose sensor (DEXCOM G6 SENSOR) Mariela change sensor every 10 days.    blood-glucose sensor (DEXCOM G7 SENSOR) Mariela Use to check bg. Change every 10 days    blood-glucose transmitter (DEXCOM G6 TRANSMITTER) Mariela 1 each by Misc.(Non-Drug; Combo Route) route every 3 (three) months.    blood-glucose transmitter (DEXCOM G6 TRANSMITTER) Mariela Use as directed change every 3 months    celecoxib (CELEBREX) 100 MG capsule Take 1 capsule (100 mg total) by mouth 2 (two) times daily.    cyclobenzaprine (FLEXERIL) 10 MG tablet Take 1 tablet (10 mg total) by mouth 3 (three) times daily as needed (Pain).    DULoxetine (CYMBALTA) 60 MG capsule Take 1 capsule by mouth 2 times daily    ergocalciferol (VITAMIN D2) 50,000 unit Cap Take 1 capsule twice weekly for 3 weeks then weekly    estradioL (ESTRACE) 0.01 % (0.1 mg/gram) vaginal cream Place 1 g vaginally twice a week.    frovatriptan (FROVA) 2.5 MG tablet Take 2.5 mg by mouth daily as needed.    furosemide " "(LASIX) 20 MG tablet Take 1 tablet (20 mg total) by mouth once daily.    glucagon (BAQSIMI) 3 mg/actuation Spry 1 spray by Nasal route as needed (hypoglycemia). For emergency use. May repeat 1 time after 15 minutes    glucagon (BAQSIMI) 3 mg/actuation [place] SPRAY 1 SPRAY IN NOSTRIL AS NEEDED FOR EMERGENCY. MAY REPEAT 1 TIME AFTER 15 MINUTES    insulin aspart U-100 (NOVOLOG U-100 INSULIN ASPART) 100 unit/mL injection To use via insulin pump. Up to 200 units every 3 days    insulin aspart U-100 (NOVOLOG) 100 unit/mL injection NovoLOG FlexPen    insulin glargine U-100, Lantus, (LANTUS SOLOSTAR U-100 INSULIN) 100 unit/mL (3 mL) InPn pen Inject up to 50 units daily in event of pump failure    insulin pump cart,automated,BT (OMNIPOD 5 G6 PODS, GEN 5,) Crtg Inject into skin every 3 days as directed    insulin syringe-needle U-100 0.3 mL 30 gauge x 5/16" Syrg 1 each by Misc.(Non-Drug; Combo Route) route Every 3 (three) days.    lamoTRIgine (LAMICTAL) 200 MG tablet Take 1 tablet (200 mg total) by mouth 2 (two) times daily.    levocetirizine (XYZAL) 5 MG tablet Take 1 tablet (5 mg total) by mouth every evening.    LIDOcaine (LIDODERM) 5 % Place 2 patches onto the skin every 12 (twelve) hours as needed (pain). Remove & Discard patch within 12 hours or as directed by MD    lifitegrast (XIIDRA) 5 % Dpet Place 1 drop into both eyes 2 (two) times daily.    lumateperone (CAPLYTA) 10.5 mg Cap Take 1 capsule (10.5 mg total) by mouth once daily.    magic mouthwash diphen/antac/lidoc/nysta Swish and spit 5 mLs every 4 (four) hours as needed.    magnesium hydroxide 400 mg/5 ml (MILK OF MAGNESIA) 400 mg/5 mL Susp Take 5 mLs by mouth every 4 (four) hours as needed.    magnesium oxide (MAG-OX) 400 mg (241.3 mg magnesium) tablet Take 1 tablet (400 mg total) by mouth once daily.    methylPREDNISolone (MEDROL DOSEPACK) 4 mg tablet use as directed    metoprolol succinate (TOPROL-XL) 50 MG 24 hr tablet Take 1 tablet (50 mg total) by mouth every " "morning.    nebulizer and compressor (COMP-AIR NEBULIZER COMPRESSOR) Mariela use as directed    omeprazole (PRILOSEC) 40 MG capsule Take 1 capsule (40 mg total) by mouth once daily.    OZEMPIC 2 mg/dose (8 mg/3 mL) PnIj Inject 2 mg into the skin every 7 days.    pen needle, diabetic (BD ULTRA-FINE MINI PEN NEEDLE) 31 gauge x 3/16" Ndle Use 1 a day in event of pump failure    polyethylene glycol (GLYCOLAX) 17 gram/dose powder Take 17 g by mouth 2 (two) times daily.    promethazine (PHENERGAN) 25 MG tablet Take 25 mg by mouth every 6 (six) hours as needed.    secukinumab (COSENTYX PEN, 2 PENS,) 150 mg/mL PnIj Inject 300 mg into the skin every 14 (fourteen) days.    semaglutide (OZEMPIC) 2 mg/dose (8 mg/3 mL) PnIj Inject 2 mg into the skin every 7 days.    SYMBICORT 160-4.5 mcg/actuation HFAA Inhale 2 puffs into the lungs in the morning and 2 puffs before bedtime. 2 puffs every 12 hours    triamcinolone acetonide 0.1% (KENALOG) 0.1 % paste Apply coating 2 times daily    zolpidem (AMBIEN) 5 MG Tab Take 1 tablet (5 mg total) by mouth nightly as needed (anxiety).    blood-glucose meter,continuous (DEXCOM G7 ) Misc 1 each by Misc.(Non-Drug; Combo Route) route once. for 1 dose    fenofibrate (TRICOR) 145 MG tablet Take 1 tablet (145 mg total) by mouth once daily.     No current facility-administered medications for this visit.     Facility-Administered Medications Ordered in Other Visits   Medication    ondansetron injection 4 mg     Current Outpatient Medications on File Prior to Visit   Medication Sig    albuterol (PROVENTIL) 2.5 mg /3 mL (0.083 %) nebulizer solution Take 2.5 mg by nebulization every 4 (four) hours as needed. Times a day    albuterol (PROVENTIL/VENTOLIN HFA) 90 mcg/actuation inhaler Inhale 2 puffs into the lungs every 6 (six) hours as needed for Wheezing.    ALPRAZolam (XANAX) 1 MG tablet Take 1 tablet (1 mg total) by mouth 2 (two) times daily as needed for Anxiety.    amlodipine-valsartan (EXFORGE) " " mg per tablet Take 1 tablet by mouth once daily.    amlodipine-valsartan (EXFORGE)  mg per tablet Take 1 tablet by mouth in the morning.    amlodipine-valsartan (EXFORGE)  mg per tablet Take 1 tablet by mouth every morning    atorvastatin (LIPITOR) 20 MG tablet Take 1 tablet (20 mg total) by mouth every evening    azelastine (ASTELIN) 137 mcg (0.1 %) nasal spray 2 sprays (274 mcg total) by Nasal route 2 (two) times daily.    BD INSULIN SYRINGE ULTRA-FINE 1/2 mL 30 gauge x 1/2" Syrg     blood-glucose sensor (DEXCOM G6 SENSOR) Mariela change sensor every 10 days.    blood-glucose sensor (DEXCOM G7 SENSOR) Mariela Use to check bg. Change every 10 days    blood-glucose transmitter (DEXCOM G6 TRANSMITTER) Mariela 1 each by Misc.(Non-Drug; Combo Route) route every 3 (three) months.    blood-glucose transmitter (DEXCOM G6 TRANSMITTER) Mariela Use as directed change every 3 months    celecoxib (CELEBREX) 100 MG capsule Take 1 capsule (100 mg total) by mouth 2 (two) times daily.    cyclobenzaprine (FLEXERIL) 10 MG tablet Take 1 tablet (10 mg total) by mouth 3 (three) times daily as needed (Pain).    DULoxetine (CYMBALTA) 60 MG capsule Take 1 capsule by mouth 2 times daily    ergocalciferol (VITAMIN D2) 50,000 unit Cap Take 1 capsule twice weekly for 3 weeks then weekly    estradioL (ESTRACE) 0.01 % (0.1 mg/gram) vaginal cream Place 1 g vaginally twice a week.    frovatriptan (FROVA) 2.5 MG tablet Take 2.5 mg by mouth daily as needed.    furosemide (LASIX) 20 MG tablet Take 1 tablet (20 mg total) by mouth once daily.    glucagon (BAQSIMI) 3 mg/actuation Spry 1 spray by Nasal route as needed (hypoglycemia). For emergency use. May repeat 1 time after 15 minutes    glucagon (BAQSIMI) 3 mg/actuation [place] SPRAY 1 SPRAY IN NOSTRIL AS NEEDED FOR EMERGENCY. MAY REPEAT 1 TIME AFTER 15 MINUTES    insulin aspart U-100 (NOVOLOG U-100 INSULIN ASPART) 100 unit/mL injection To use via insulin pump. Up to 200 units every 3 days    " "insulin aspart U-100 (NOVOLOG) 100 unit/mL injection NovoLOG FlexPen    insulin glargine U-100, Lantus, (LANTUS SOLOSTAR U-100 INSULIN) 100 unit/mL (3 mL) InPn pen Inject up to 50 units daily in event of pump failure    insulin pump cart,automated,BT (OMNIPOD 5 G6 PODS, GEN 5,) Crtg Inject into skin every 3 days as directed    insulin syringe-needle U-100 0.3 mL 30 gauge x 5/16" Syrg 1 each by Misc.(Non-Drug; Combo Route) route Every 3 (three) days.    lamoTRIgine (LAMICTAL) 200 MG tablet Take 1 tablet (200 mg total) by mouth 2 (two) times daily.    levocetirizine (XYZAL) 5 MG tablet Take 1 tablet (5 mg total) by mouth every evening.    LIDOcaine (LIDODERM) 5 % Place 2 patches onto the skin every 12 (twelve) hours as needed (pain). Remove & Discard patch within 12 hours or as directed by MD    lifitegrast (XIIDRA) 5 % Dpet Place 1 drop into both eyes 2 (two) times daily.    lumateperone (CAPLYTA) 10.5 mg Cap Take 1 capsule (10.5 mg total) by mouth once daily.    magic mouthwash diphen/antac/lidoc/nysta Swish and spit 5 mLs every 4 (four) hours as needed.    magnesium hydroxide 400 mg/5 ml (MILK OF MAGNESIA) 400 mg/5 mL Susp Take 5 mLs by mouth every 4 (four) hours as needed.    magnesium oxide (MAG-OX) 400 mg (241.3 mg magnesium) tablet Take 1 tablet (400 mg total) by mouth once daily.    methylPREDNISolone (MEDROL DOSEPACK) 4 mg tablet use as directed    metoprolol succinate (TOPROL-XL) 50 MG 24 hr tablet Take 1 tablet (50 mg total) by mouth every morning.    nebulizer and compressor (COMP-AIR NEBULIZER COMPRESSOR) Mariela use as directed    omeprazole (PRILOSEC) 40 MG capsule Take 1 capsule (40 mg total) by mouth once daily.    OZEMPIC 2 mg/dose (8 mg/3 mL) PnIj Inject 2 mg into the skin every 7 days.    pen needle, diabetic (BD ULTRA-FINE MINI PEN NEEDLE) 31 gauge x 3/16" Ndle Use 1 a day in event of pump failure    polyethylene glycol (GLYCOLAX) 17 gram/dose powder Take 17 g by mouth 2 (two) times daily.    " promethazine (PHENERGAN) 25 MG tablet Take 25 mg by mouth every 6 (six) hours as needed.    secukinumab (COSENTYX PEN, 2 PENS,) 150 mg/mL PnIj Inject 300 mg into the skin every 14 (fourteen) days.    semaglutide (OZEMPIC) 2 mg/dose (8 mg/3 mL) PnIj Inject 2 mg into the skin every 7 days.    SYMBICORT 160-4.5 mcg/actuation HFAA Inhale 2 puffs into the lungs in the morning and 2 puffs before bedtime. 2 puffs every 12 hours    triamcinolone acetonide 0.1% (KENALOG) 0.1 % paste Apply coating 2 times daily    zolpidem (AMBIEN) 5 MG Tab Take 1 tablet (5 mg total) by mouth nightly as needed (anxiety).    blood-glucose meter,continuous (DEXCOM G7 ) Misc 1 each by Misc.(Non-Drug; Combo Route) route once. for 1 dose    fenofibrate (TRICOR) 145 MG tablet Take 1 tablet (145 mg total) by mouth once daily.    [DISCONTINUED] clonazePAM (KLONOPIN) 1 MG tablet Take 1 tablet by mouth daily    [DISCONTINUED] glucagon, human recombinant, (GLUCAGON EMERGENCY KIT, HUMAN,) 1 mg SolR Inject 1 mg into the muscle as needed. Emergency use    [DISCONTINUED] valsartan (DIOVAN) 320 MG tablet Take 1 tablet (320 mg total) by mouth once daily.     Current Facility-Administered Medications on File Prior to Visit   Medication    ondansetron injection 4 mg     Review of patient's allergies indicates:   Allergen Reactions    Codeine Itching    Hydromorphone Other (See Comments)     Can't wake up for long time if taken    Slow to wake up after surgery after receiving    Aleve [naproxen sodium]      Increases BP    Ibuprofen      Increases BP    Neuromuscular blockers, steroidal Hives     some    Pregabalin Itching    Latex, natural rubber Rash    Morphine Rash     itching    Norco [hydrocodone-acetaminophen] Itching, Rash and Hallucinations    Secobarbital sodium Rash     itching    Tylox [oxycodone-acetaminophen] Rash       Review of Systems   Cardiovascular:  Positive for chest pain and palpitations. Negative for syncope.   Respiratory:   Positive for shortness of breath.    Gastrointestinal:  Positive for heartburn.   Genitourinary: Negative.    Neurological:  Positive for dizziness.       Objective:   Physical Exam  Vitals reviewed.   Constitutional:       Appearance: She is well-developed.   Neck:      Vascular: No carotid bruit.   Cardiovascular:      Rate and Rhythm: Normal rate and regular rhythm.      Pulses: Intact distal pulses.      Heart sounds: Normal heart sounds. No murmur heard.  Pulmonary:      Breath sounds: Normal breath sounds.   Neurological:      Mental Status: She is oriented to person, place, and time.       Vitals:    09/17/24 1114   BP: 125/80   BP Location: Left arm   Patient Position: Sitting   BP Method: Large (Manual)   Pulse: 89   SpO2: 98%   Weight: 93.6 kg (206 lb 5.6 oz)     Lab Results   Component Value Date    CHOL 209 (H) 04/29/2024    CHOL 107 (L) 08/08/2023    CHOL 131 09/21/2022     Lab Results   Component Value Date    HDL 65 04/29/2024    HDL 44 08/08/2023    HDL 38 (L) 09/21/2022     Lab Results   Component Value Date    LDLCALC 123 (H) 04/29/2024    LDLCALC 43.4 (L) 08/08/2023    LDLCALC 43.8 (L) 09/21/2022     Lab Results   Component Value Date    TRIG 117 04/29/2024    TRIG 98 08/08/2023    TRIG 246 (H) 09/21/2022     Lab Results   Component Value Date    CHOLHDL 41.1 08/08/2023    CHOLHDL 29.0 09/21/2022    CHOLHDL 20.3 06/24/2022       Chemistry        Component Value Date/Time     07/30/2024 1043    K 4.2 07/30/2024 1043     07/30/2024 1043    CO2 25 07/30/2024 1043    BUN 9 07/30/2024 1043    CREATININE 0.7 07/30/2024 1043     07/30/2024 1043        Component Value Date/Time    CALCIUM 9.3 07/30/2024 1043    ALKPHOS 80 07/30/2024 1043    AST 12 07/30/2024 1043    ALT 12 07/30/2024 1043    BILITOT 0.2 07/30/2024 1043    ESTGFRAFRICA >60.0 03/08/2022 0735    EGFRNONAA >60.0 03/08/2022 0735          Lab Results   Component Value Date    TSH 2.360 04/29/2024     Lab Results   Component  Value Date    INR 1.0 06/26/2020    INR 0.9 11/23/2016     Lab Results   Component Value Date    WBC 6.23 07/30/2024    HGB 9.9 (L) 07/30/2024    HCT 35.3 (L) 07/30/2024    MCV 88 07/30/2024     07/30/2024     BMP  Sodium   Date Value Ref Range Status   07/30/2024 139 136 - 145 mmol/L Final     Potassium   Date Value Ref Range Status   07/30/2024 4.2 3.5 - 5.1 mmol/L Final     Chloride   Date Value Ref Range Status   07/30/2024 106 95 - 110 mmol/L Final     CO2   Date Value Ref Range Status   07/30/2024 25 23 - 29 mmol/L Final     BUN   Date Value Ref Range Status   07/30/2024 9 6 - 20 mg/dL Final     Creatinine   Date Value Ref Range Status   07/30/2024 0.7 0.5 - 1.4 mg/dL Final     Calcium   Date Value Ref Range Status   07/30/2024 9.3 8.7 - 10.5 mg/dL Final     Anion Gap   Date Value Ref Range Status   07/30/2024 8 8 - 16 mmol/L Final     eGFR if    Date Value Ref Range Status   03/08/2022 >60.0 >60 mL/min/1.73 m^2 Final     eGFR if non    Date Value Ref Range Status   03/08/2022 >60.0 >60 mL/min/1.73 m^2 Final     Comment:     Calculation used to obtain the estimated glomerular filtration  rate (eGFR) is the CKD-EPI equation.        CrCl cannot be calculated (Patient's most recent lab result is older than the maximum 7 days allowed.).  Lab Results   Component Value Date    HGBA1C 6.6 (H) 06/27/2024     Results for orders placed during the hospital encounter of 09/03/24    Nuclear Stress - Cardiology Interpreted    Interpretation Summary    Normal myocardial perfusion scan. There is no evidence of myocardial ischemia or infarction.    There is a mild intensity perfusion abnormality in the anterior wall of the left ventricle, secondary to breast attenuation.    The gated perfusion images showed an ejection fraction of 64% at rest. The gated perfusion images showed an ejection fraction of 70% post stress.    The ECG portion of the study is negative for ischemia.    The patient  reported no chest pain during the stress test.    There were no arrhythmias during stress.    TID score 1.34      Assessment:   Essential hypertension    Other chest pain    Abnormal stress test  -     CBC Auto Differential; Future; Expected date: 09/17/2024  -     Basic metabolic panel; Future; Expected date: 09/17/2024  -     Protime-INR; Future; Expected date: 09/17/2024    Transient ischemic dilation of left ventricle of heart  -     CBC Auto Differential; Future; Expected date: 09/17/2024  -     Basic metabolic panel; Future; Expected date: 09/17/2024  -     Protime-INR; Future; Expected date: 09/17/2024    COATS (dyspnea on exertion)    Diabetic polyneuropathy associated with type 2 diabetes mellitus    Schizoaffective disorder, bipolar type    Bipolar 1 disorder    Generalized anxiety disorder    Type 2 diabetes mellitus with hyperglycemia, with long-term current use of insulin    NAFLD (nonalcoholic fatty liver disease)    Blood transfusion declined because patient is Yaz[employer]ism            Plan:   Continue with Toprol amlodipine and valsartan.    Reviewed abnormal stress test results with the patient in the light also of coronary calcification seen on CT scan we will recommend a left heart catheterization out triple-vessel disease or left main disease given her TID.  Cardiac low salt diet.  Risk factor modification and excercise program.  Reviewed current meds as above  Continue with statins  Compression stockings  Still recommended   Reviewed all tests and above medical conditions with patient in detail and formulated treatment plan.  Risk factor modification discussed.   Cardiac low salt diet discussed.  Considering agreeable.  Risks and alternatives explained to the patient care she is a [employer] and refuses blood transfusion.  RTC after heart catheterization

## 2024-09-18 ENCOUNTER — NURSE TRIAGE (OUTPATIENT)
Dept: ADMINISTRATIVE | Facility: CLINIC | Age: 54
End: 2024-09-18
Payer: MEDICAID

## 2024-09-18 ENCOUNTER — TELEPHONE (OUTPATIENT)
Dept: PSYCHIATRY | Facility: CLINIC | Age: 54
End: 2024-09-18
Payer: MEDICAID

## 2024-09-18 ENCOUNTER — PATIENT MESSAGE (OUTPATIENT)
Dept: DIABETES | Facility: CLINIC | Age: 54
End: 2024-09-18
Payer: MEDICAID

## 2024-09-18 ENCOUNTER — OFFICE VISIT (OUTPATIENT)
Dept: HEPATOLOGY | Facility: CLINIC | Age: 54
End: 2024-09-18
Payer: MEDICAID

## 2024-09-18 DIAGNOSIS — K75.81 NASH (NONALCOHOLIC STEATOHEPATITIS): ICD-10-CM

## 2024-09-18 DIAGNOSIS — I10 ESSENTIAL HYPERTENSION: Chronic | ICD-10-CM

## 2024-09-18 DIAGNOSIS — K76.0 METABOLIC DYSFUNCTION-ASSOCIATED STEATOTIC LIVER DISEASE (MASLD): ICD-10-CM

## 2024-09-18 DIAGNOSIS — R16.0 HEPATOMEGALY: ICD-10-CM

## 2024-09-18 DIAGNOSIS — Z79.4 TYPE 2 DIABETES MELLITUS WITH HYPERGLYCEMIA, WITH LONG-TERM CURRENT USE OF INSULIN: Chronic | ICD-10-CM

## 2024-09-18 DIAGNOSIS — K76.0 FATTY LIVER: ICD-10-CM

## 2024-09-18 DIAGNOSIS — E11.65 TYPE 2 DIABETES MELLITUS WITH HYPERGLYCEMIA, WITH LONG-TERM CURRENT USE OF INSULIN: Chronic | ICD-10-CM

## 2024-09-18 NOTE — PROGRESS NOTES
Yolanda Betts   is a 54 y.o.. I performed this consultation using real-time Telehealth tools, including a live video connection between my location and the patient's location. Prior to initiating the consultation, I obtained informed verbal consent to perform this consultation using Telehealth tools and answered all the questions about the Telehealth interaction.      Each patient to whom he or she provides medical services by telemedicine is:  (1) informed of the relationship between the physician and patient and the respective role of any other health care provider with respect to management of the patient; and (2) notified that he or she may decline to receive medical services by telemedicine and may withdraw from such care at any time.    Hepatology Note    PATIENT: Yolanda Betts  MRN: [id number]  DATE: 9/18/2024    Provider: Hepatologist - Dr Mccabe  Urgency of review: non-urgent  Referring provider: Dr. Clarke Hernandez    Diagnosis:   1. BMI 40.0-44.9, adult    2. DAVIS (nonalcoholic steatohepatitis)    3. Fatty liver    4. Hepatomegaly    5. Metabolic dysfunction-associated steatotic liver disease (MASLD)    6. Essential hypertension    7. Type 2 diabetes mellitus with hyperglycemia, with long-term current use of insulin          Chief complaint:   Chief Complaint   Patient presents with    Fatty Liver       Subjective:    Initial History: Yolanda Betts is a 54 y.o. female who was referred to Hepatology Clinic for consultation of MASLD      09/18/2024   Patient has diagnosed of fatty liver in 2023 on Imaging. Patient has metabolic risk factors  Patient was on GLP-1 for last years but now off since  after COVID and gained 10 lbs  Patient does not have any new complains from liver standpoint. Liver enzymes are stable. She is more complaint with diabetes and other meds for Asthma.   She has  multiple episodes uti and now suffering from shortness of breath necessitating aftyer COVID requiring er  visits     As regards to liver disease,  - The patient reports no symptoms of hepatitis including malaise or flu-like symptoms to suggest a flare.  - The patient reports no new manifestations of portal hypertension including ascites, edema, GI bleeding, or hepatic encephalopathy to suggest liver decompensation.  - The patient reports no fevers/chills or pruritis to suggest biliary disease.    Hepatitis Viral markers negative    Fibro scan 2024  Median liver stiffness score:  4.9  CAP Reading: dB/m:  201  IQR/med %:  26  Interpretation  Fibrosis interpretation is based on medial liver stiffness - Kilopascal (kPa).  Fibrosis Stage:  F 0-1  Steatosis interpretation is based on controlled attenuation parameter - (dB/m).  Steatosis Grade:  <S1  Comments/Plan:  No significant steatosis or fibrosis     Dx Asthma control  with pulmonary  O2 desaturation nocturnal o2 therapy . Plan Heart cath    Prior Relevant History:    She  denies hepatotoxic medication    Review of systems:  A review of 12+ systems was conducted with pertinent positive and negative findings documented in HPI with all other systems reviewed and negative.      PFSH:  Past medical, family, and social history reviewed as documented in chart with pertinent positive medical, family, and social history detailed in HPI.    Past Medical History:   Past Medical History:   Diagnosis Date    Abnormal Pap smear of cervix     HPV genital warts    Anemia     Anxiety     Arthritis     Asthma 10/11/2016    Bipolar 1 disorder     Diabetes mellitus, type 2     Dyslipidemia associated with type 2 diabetes mellitus 05/13/2019    Dyspnea due to COVID-19 09/12/2024    General anesthetics causing adverse effect in therapeutic use     Genital warts     GERD (gastroesophageal reflux disease)     Herpes simplex virus (HSV) infection     Hyperlipidemia     Hypertension     Hypertension complicating diabetes 05/05/2019    Migraine with aura and without status migrainosus, not  intractable 2016    Mild persistent asthma without complication 10/11/2016    Morbid obesity with body mass index (BMI) of 45.0 to 49.9 in adult 2017    Obstructive sleep apnea     ALEN (obstructive sleep apnea)     2L per N/C q HS    Schizoaffective disorder, bipolar type 2019    Seasonal allergic rhinitis due to pollen 2019    Type 2 diabetes mellitus with hyperglycemia, with long-term current use of insulin 2017    Type 2 diabetes mellitus with hyperglycemia, without long-term current use of insulin 2017       Past Surgical HIstory:   Past Surgical History:   Procedure Laterality Date    abcess removed right back      BELT ABDOMINOPLASTY  1996    BREAST SURGERY Bilateral     Reduction    CARPAL TUNNEL RELEASE Bilateral 2023    Procedure: RELEASE, CARPAL TUNNEL;  Surgeon: Neville Roth MD;  Location: [facility] OR;  Service: Orthopedics;  Laterality: Bilateral;  bilateral carpal tunnel release     SECTION      X 2    COLONOSCOPY      COLONOSCOPY N/A 2018    Procedure: colonoscopy for iron deficiency anemia;  Surgeon: Frankie Sanchez MD;  Location: [facility];  Service: Endoscopy;  Laterality: N/A;    COLONOSCOPY N/A 2018    Procedure: COLONOSCOPY;  Surgeon: Frankie Sanchez MD;  Location: [facility];  Service: Endoscopy;  Laterality: N/A;    COLONOSCOPY N/A 3/10/2023    Procedure: COLONOSCOPY;  Surgeon: Lalita Montes De Oca MD;  Location: [facility];  Service: Endoscopy;  Laterality: N/A;    COLONOSCOPY N/A 2024    Procedure: COLONOSCOPY;  Surgeon: Tara Og MD;  Location: [facility];  Service: Endoscopy;  Laterality: N/A;    EPIDURAL STEROID INJECTION INTO CERVICAL SPINE N/A 2023    Procedure: C6/7 IL ROCAEL RN IV Sedation;  Surgeon: Otto Phillips MD;  Location: [facility] PAIN MGT;  Service: Pain Management;  Laterality: N/A;    EPIDURAL STEROID INJECTION INTO CERVICAL SPINE N/A 2024    Procedure: C5/6 IL ROCAEL;  Surgeon: Alan  "Otto LEHMAN MD;  Location: [facility] PAIN MGT;  Service: Pain Management;  Laterality: N/A;    ESOPHAGOGASTRODUODENOSCOPY N/A 3/10/2023    Procedure: EGD (ESOPHAGOGASTRODUODENOSCOPY);  Surgeon: Lalita Montes De Oca MD;  Location: [facility] ENDO;  Service: Endoscopy;  Laterality: N/A;    HYSTERECTOMY      w/ BSO; hypermenorrhea    INJECTION OF ANESTHETIC AGENT AROUND MEDIAL BRANCH NERVES INNERVATING CERVICAL FACET JOINT Bilateral 12/19/2023    Procedure: Bilateral C5-7 MBB (diagnostic);  Surgeon: Otto Phillips MD;  Location: [facility] PAIN MGT;  Service: Pain Management;  Laterality: Bilateral;    MOUTH SURGERY  1996    OOPHORECTOMY      hyst/bso, hypermenorrhea    ROBOT-ASSISTED CHOLECYSTECTOMY USING DA DARI XI N/A 1/3/2020    Procedure: XI ROBOTIC CHOLECYSTECTOMY;  Surgeon: Damir Driscoll MD;  Location: [facility] OR;  Service: General;  Laterality: N/A;    TOTAL REDUCTION MAMMOPLASTY      TUBAL LIGATION         Family History:   Family History   Problem Relation Name Age of Onset    Diabetes Mother      Hyperlipidemia Mother      Hypertension Mother      Asthma Mother      COPD Mother      Glaucoma Mother      Thyroid disease Mother      Anesthesia problems Mother          "almost had a cardiac arrest" , blood clots    Hypertension Father      Hyperlipidemia Father      Cancer Father          Brain, lung, liver, kidney    No Known Problems Sister      Hypertension Brother      Alcohol abuse Brother      Heart disease Maternal Grandmother      Hyperlipidemia Maternal Grandmother      Hypertension Maternal Grandmother      Cataracts Maternal Grandmother      Diabetes Maternal Grandmother      Heart disease Maternal Grandfather      Hyperlipidemia Maternal Grandfather      Hypertension Maternal Grandfather      Glaucoma Maternal Grandfather      Cancer Maternal Grandfather      Cataracts Maternal Grandfather      Macular degeneration Maternal Grandfather      Diabetes Maternal Grandfather      Heart disease Paternal Grandmother   "    Hyperlipidemia Paternal Grandmother      Hypertension Paternal Grandmother      Cataracts Paternal Grandmother      Heart disease Paternal Grandfather      Hyperlipidemia Paternal Grandfather      Hypertension Paternal Grandfather      Cataracts Paternal Grandfather      Breast cancer Maternal Cousin      Breast cancer Maternal Cousin      Breast cancer Maternal Cousin      Breast cancer Maternal Cousin       She has no known family history of liver disease.     Social History:  reports that she has never smoked. She has never used smokeless tobacco. She reports current alcohol use. She reports that she does not use drugs.    She has no significant history of Alcohol     She denies history of IV drug use/Tatto  She  denies high-risk sexual contacts, no raw seafood, no sick contacts      Allergies:  Review of patient's allergies indicates:   Allergen Reactions    Codeine Itching    Hydromorphone Other (See Comments)     Can't wake up for long time if taken    Slow to wake up after surgery after receiving    Aleve [naproxen sodium]      Increases BP    Ibuprofen      Increases BP    Neuromuscular blockers, steroidal Hives     some    Pregabalin Itching    Latex, natural rubber Rash    Morphine Rash     itching    Norco [hydrocodone-acetaminophen] Itching, Rash and Hallucinations    Secobarbital sodium Rash     itching    Tylox [oxycodone-acetaminophen] Rash       Medications:  Current Outpatient Medications   Medication Sig Dispense Refill    albuterol (PROVENTIL) 2.5 mg /3 mL (0.083 %) nebulizer solution Take 2.5 mg by nebulization every 4 (four) hours as needed. Times a day 75 mL 1    albuterol (PROVENTIL/VENTOLIN HFA) 90 mcg/actuation inhaler Inhale 2 puffs into the lungs every 6 (six) hours as needed for Wheezing. 8.5 g 1    ALPRAZolam (XANAX) 1 MG tablet Take 1 tablet (1 mg total) by mouth 2 (two) times daily as needed for Anxiety. 60 tablet 2    amlodipine-valsartan (EXFORGE)  mg per tablet Take 1  "tablet by mouth once daily. 90 tablet 3    amlodipine-valsartan (EXFORGE)  mg per tablet Take 1 tablet by mouth in the morning. 90 tablet 1    amlodipine-valsartan (EXFORGE)  mg per tablet Take 1 tablet by mouth every morning 90 tablet 1    atorvastatin (LIPITOR) 20 MG tablet Take 1 tablet (20 mg total) by mouth every evening 90 tablet 3    azelastine (ASTELIN) 137 mcg (0.1 %) nasal spray 2 sprays (274 mcg total) by Nasal route 2 (two) times daily. 30 mL 0    BD INSULIN SYRINGE ULTRA-FINE 1/2 mL 30 gauge x 1/2" Syrg   0    blood-glucose meter,continuous (DEXCOM G7 ) Misc 1 each by Misc.(Non-Drug; Combo Route) route once. for 1 dose 1 each 0    blood-glucose sensor (DEXCOM G6 SENSOR) Mariela change sensor every 10 days. 3 each 11    blood-glucose sensor (DEXCOM G7 SENSOR) Mariela Use to check bg. Change every 10 days 3 each 11    blood-glucose transmitter (DEXCOM G6 TRANSMITTER) Mariela 1 each by Misc.(Non-Drug; Combo Route) route every 3 (three) months. 1 each 3    blood-glucose transmitter (DEXCOM G6 TRANSMITTER) Mariela Use as directed change every 3 months 1 each 3    celecoxib (CELEBREX) 100 MG capsule Take 1 capsule (100 mg total) by mouth 2 (two) times daily. 60 capsule 2    cyclobenzaprine (FLEXERIL) 10 MG tablet Take 1 tablet (10 mg total) by mouth 3 (three) times daily as needed (Pain). 90 tablet 11    DULoxetine (CYMBALTA) 60 MG capsule Take 1 capsule by mouth 2 times daily 180 capsule 3    ergocalciferol (VITAMIN D2) 50,000 unit Cap Take 1 capsule twice weekly for 3 weeks then weekly 12 capsule 3    estradioL (ESTRACE) 0.01 % (0.1 mg/gram) vaginal cream Place 1 g vaginally twice a week. 42.5 g 3    fenofibrate (TRICOR) 145 MG tablet Take 1 tablet (145 mg total) by mouth once daily. 90 tablet 3    frovatriptan (FROVA) 2.5 MG tablet Take 2.5 mg by mouth daily as needed.      furosemide (LASIX) 20 MG tablet Take 1 tablet (20 mg total) by mouth once daily. 90 tablet 3    glucagon (BAQSIMI) 3 " "mg/actuation Spry 1 spray by Nasal route as needed (hypoglycemia). For emergency use. May repeat 1 time after 15 minutes 2 each 1    glucagon (BAQSIMI) 3 mg/actuation [place] SPRAY 1 SPRAY IN NOSTRIL AS NEEDED FOR EMERGENCY. MAY REPEAT 1 TIME AFTER 15 MINUTES 2 each 1    insulin aspart U-100 (NOVOLOG U-100 INSULIN ASPART) 100 unit/mL injection To use via insulin pump. Up to 200 units every 3 days 20 mL 5    insulin aspart U-100 (NOVOLOG) 100 unit/mL injection NovoLOG FlexPen      insulin glargine U-100, Lantus, (LANTUS SOLOSTAR U-100 INSULIN) 100 unit/mL (3 mL) InPn pen Inject up to 50 units daily in event of pump failure 15 mL 3    insulin pump cart,automated,BT (OMNIPOD 5 G6 PODS, GEN 5,) Crtg Inject into skin every 3 days as directed 30 each 3    insulin syringe-needle U-100 0.3 mL 30 gauge x 5/16" Syrg 1 each by Misc.(Non-Drug; Combo Route) route Every 3 (three) days. 100 each 2    lamoTRIgine (LAMICTAL) 200 MG tablet Take 1 tablet (200 mg total) by mouth 2 (two) times daily. 60 tablet 2    levocetirizine (XYZAL) 5 MG tablet Take 1 tablet (5 mg total) by mouth every evening. 30 tablet 11    LIDOcaine (LIDODERM) 5 % Place 2 patches onto the skin every 12 (twelve) hours as needed (pain). Remove & Discard patch within 12 hours or as directed by MD 60 patch 11    lifitegrast (XIIDRA) 5 % Dpet Place 1 drop into both eyes 2 (two) times daily. 60 each 12    lumateperone (CAPLYTA) 10.5 mg Cap Take 1 capsule (10.5 mg total) by mouth once daily. 30 capsule 2    magic mouthwash diphen/antac/lidoc/nysta Swish and spit 5 mLs every 4 (four) hours as needed. 118 mL 0    magnesium hydroxide 400 mg/5 ml (MILK OF MAGNESIA) 400 mg/5 mL Susp Take 5 mLs by mouth every 4 (four) hours as needed.      magnesium oxide (MAG-OX) 400 mg (241.3 mg magnesium) tablet Take 1 tablet (400 mg total) by mouth once daily. 90 tablet 0    methylPREDNISolone (MEDROL DOSEPACK) 4 mg tablet use as directed 21 tablet 1    metoprolol succinate (TOPROL-XL) 50 " "MG 24 hr tablet Take 1 tablet (50 mg total) by mouth every morning. 30 tablet 11    nebulizer and compressor (COMP-AIR NEBULIZER COMPRESSOR) Mariela use as directed 1 each 0    omeprazole (PRILOSEC) 40 MG capsule Take 1 capsule (40 mg total) by mouth once daily. 90 capsule 1    OZEMPIC 2 mg/dose (8 mg/3 mL) PnIj Inject 2 mg into the skin every 7 days.      pen needle, diabetic (BD ULTRA-FINE MINI PEN NEEDLE) 31 gauge x 3/16" Ndle Use 1 a day in event of pump failure 50 each 11    polyethylene glycol (GLYCOLAX) 17 gram/dose powder Take 17 g by mouth 2 (two) times daily. 1020 g 11    promethazine (PHENERGAN) 25 MG tablet Take 25 mg by mouth every 6 (six) hours as needed.      secukinumab (COSENTYX PEN, 2 PENS,) 150 mg/mL PnIj Inject 300 mg into the skin every 14 (fourteen) days. 4 mL 3    semaglutide (OZEMPIC) 2 mg/dose (8 mg/3 mL) PnIj Inject 2 mg into the skin every 7 days. 3 mL 2    SYMBICORT 160-4.5 mcg/actuation HFAA Inhale 2 puffs into the lungs in the morning and 2 puffs before bedtime. 2 puffs every 12 hours 10.2 g 2    triamcinolone acetonide 0.1% (KENALOG) 0.1 % paste Apply coating 2 times daily 5 g 12    zolpidem (AMBIEN) 5 MG Tab Take 1 tablet (5 mg total) by mouth nightly as needed (anxiety). 30 tablet 2     No current facility-administered medications for this visit.     Facility-Administered Medications Ordered in Other Visits   Medication Dose Route Frequency Provider Last Rate Last Admin    ondansetron injection 4 mg  4 mg Intravenous Once PRN Otto Phillips MD           Review of Systems   Constitutional:  Negative for fatigue, fever and unexpected weight change.   HENT:  Negative for ear pain, nosebleeds and trouble swallowing.    Eyes:  Negative for discharge and redness.   Respiratory:  Negative for cough and shortness of breath.    Cardiovascular:  Negative for palpitations and leg swelling.   Gastrointestinal:  Negative for abdominal distention, abdominal pain, diarrhea and vomiting. "   Endocrine: Negative for cold intolerance and polyuria.   Genitourinary:  Negative for flank pain and hematuria.   Musculoskeletal:  Negative for back pain.   Skin:  Negative for pallor.   Neurological:  Negative for seizures and headaches.   Hematological:  Does not bruise/bleed easily.   Psychiatric/Behavioral:  Negative for confusion and hallucinations.          Objective:      Vitals: There were no vitals filed for this visit.    Physical Exam  Constitutional:       Appearance: Normal appearance.   HENT:      Head: Normocephalic and atraumatic.      Right Ear: Tympanic membrane and external ear normal.      Left Ear: Tympanic membrane and external ear normal.      Mouth/Throat:      Mouth: Mucous membranes are moist.   Eyes:      Extraocular Movements: Extraocular movements intact.      Pupils: Pupils are equal, round, and reactive to light.   Cardiovascular:      Rate and Rhythm: Normal rate and regular rhythm.      Pulses: Normal pulses.      Heart sounds: Normal heart sounds.   Pulmonary:      Effort: Pulmonary effort is normal.      Breath sounds: Normal breath sounds.   Abdominal:      General: Bowel sounds are normal. There is no distension.      Palpations: Abdomen is soft. There is no mass.      Tenderness: There is no abdominal tenderness.   Musculoskeletal:         General: No swelling or deformity. Normal range of motion.      Cervical back: Normal range of motion and neck supple.   Skin:     Coloration: Skin is not jaundiced.   Neurological:      General: No focal deficit present.      Mental Status: She is alert and oriented to person, place, and time.      Cranial Nerves: No cranial nerve deficit.   Psychiatric:         Mood and Affect: Mood normal.         Behavior: Behavior normal.         Laboratory Data:  Lab Visit on 09/17/2024   Component Date Value Ref Range Status    WBC 09/17/2024 6.68  3.90 - 12.70 K/uL Final    RBC 09/17/2024 4.11  4.00 - 5.40 M/uL Final    Hemoglobin 09/17/2024 10.0  (L)  12.0 - 16.0 g/dL Final    Hematocrit 09/17/2024 34.6 (L)  37.0 - 48.5 % Final    MCV 09/17/2024 84  82 - 98 fL Final    MCH 09/17/2024 24.3 (L)  27.0 - 31.0 pg Final    MCHC 09/17/2024 28.9 (L)  32.0 - 36.0 g/dL Final    RDW 09/17/2024 15.4 (H)  11.5 - 14.5 % Final    Platelets 09/17/2024 374  150 - 450 K/uL Final    MPV 09/17/2024 9.8  9.2 - 12.9 fL Final    Immature Granulocytes 09/17/2024 0.6 (H)  0.0 - 0.5 % Final    Gran # (ANC) 09/17/2024 4.0  1.8 - 7.7 K/uL Final    Immature Grans (Abs) 09/17/2024 0.04  0.00 - 0.04 K/uL Final    Comment: Mild elevation in immature granulocytes is non specific and   can be seen in a variety of conditions including stress response,   acute inflammation, trauma and pregnancy. Correlation with other   laboratory and clinical findings is essential.      Lymph # 09/17/2024 2.1  1.0 - 4.8 K/uL Final    Mono # 09/17/2024 0.5  0.3 - 1.0 K/uL Final    Eos # 09/17/2024 0.0  0.0 - 0.5 K/uL Final    Baso # 09/17/2024 0.01  0.00 - 0.20 K/uL Final    nRBC 09/17/2024 0  0 /100 WBC Final    Gran % 09/17/2024 60.4  38.0 - 73.0 % Final    Lymph % 09/17/2024 31.9  18.0 - 48.0 % Final    Mono % 09/17/2024 7.0  4.0 - 15.0 % Final    Eosinophil % 09/17/2024 0.0  0.0 - 8.0 % Final    Basophil % 09/17/2024 0.1  0.0 - 1.9 % Final    Differential Method 09/17/2024 Automated   Final    Sodium 09/17/2024 139  136 - 145 mmol/L Final    Potassium 09/17/2024 4.3  3.5 - 5.1 mmol/L Final    Chloride 09/17/2024 103  95 - 110 mmol/L Final    CO2 09/17/2024 29  23 - 29 mmol/L Final    Glucose 09/17/2024 101  70 - 110 mg/dL Final    BUN 09/17/2024 7  6 - 20 mg/dL Final    Creatinine 09/17/2024 0.7  0.5 - 1.4 mg/dL Final    Calcium 09/17/2024 9.1  8.7 - 10.5 mg/dL Final    Anion Gap 09/17/2024 7 (L)  8 - 16 mmol/L Final    eGFR 09/17/2024 >60.0  >60 mL/min/1.73 m^2 Final    Prothrombin Time 09/17/2024 10.3  9.0 - 12.5 sec Final    INR 09/17/2024 0.9  0.8 - 1.2 Final    Comment: Coumadin Therapy:  2.0 - 3.0  "for INR for all indicators except mechanical heart valves  and antiphospholipid syndromes which should use 2.5 - 3.5.     Clinical Support on 09/12/2024   Component Date Value Ref Range Status    POC PH 09/12/2024 7.447  7.35 - 7.45 Final    POC PCO2 09/12/2024 39.7  35 - 45 mmHg Final    POC PO2 09/12/2024 92  80 - 100 mmHg Final    POC HCO3 09/12/2024 27.4  24 - 28 mmol/L Final    POC BE 09/12/2024 3 (H)  -2 to 2 mmol/L Final    POC SATURATED O2 09/12/2024 97  95 - 100 % Final    Sample 09/12/2024 ARTERIAL   Final    Site 09/12/2024 LR   Final    Allens Test 09/12/2024 Pass   Final    DelSys 09/12/2024 Room Air   Final    Mode 09/12/2024 SPONT   Final    FiO2 09/12/2024 21   Final       Lab Results   Component Value Date    INR 0.9 09/17/2024    INR 1.0 06/26/2020    INR 0.9 11/23/2016       No results found for: "SMOOTHMUSCAB", "MITOAB"  Lab Results   Component Value Date    IRON 59 04/29/2024    TIBC 390 04/29/2024    FERRITIN 28 04/29/2024     Lab Results   Component Value Date    HEPAIGM Non-reactive 06/27/2023    HEPBIGM Non-reactive 06/27/2023    HEPCAB Non-reactive 06/27/2023     Lab Results   Component Value Date    TSH 2.360 04/29/2024     No results found for: "ISAAC"    No results found for: "ABORH"        Lab Results   Component Value Date    HGBA1C 6.6 (H) 06/27/2024     Lab Results   Component Value Date    CHOL 209 (H) 04/29/2024    CHOL 107 (L) 08/08/2023    CHOL 131 09/21/2022     Lab Results   Component Value Date    HDL 65 04/29/2024    HDL 44 08/08/2023    HDL 38 (L) 09/21/2022     Lab Results   Component Value Date    LDLCALC 123 (H) 04/29/2024    LDLCALC 43.4 (L) 08/08/2023    LDLCALC 43.8 (L) 09/21/2022     Lab Results   Component Value Date    TRIG 117 04/29/2024    TRIG 98 08/08/2023    TRIG 246 (H) 09/21/2022     Lab Results   Component Value Date    CHOLHDL 41.1 08/08/2023    CHOLHDL 29.0 09/21/2022    CHOLHDL 20.3 06/24/2022         I personally reviewed imaging studies and outside " records..      Assessment:       1. BMI 40.0-44.9, adult    2. DAVIS (nonalcoholic steatohepatitis)    3. Fatty liver    4. Hepatomegaly    5. Metabolic dysfunction-associated steatotic liver disease (MASLD)    6. Essential hypertension    7. Type 2 diabetes mellitus with hyperglycemia, with long-term current use of insulin                 The patient's risk factors for MAFLD include:    Obesity/overweight                     yes There is no height or weight on file to calculate BMI.  Dyslipidemia                                yes  Insulin resistance/Diabetes         yes  Lab Results   Component Value Date    HGBA1C 6.6 (H) 06/27/2024             Plan:     Problem List Items Addressed This Visit          Cardiac/Vascular    Essential hypertension (Chronic)       Endocrine    Type 2 diabetes mellitus with hyperglycemia, with long-term current use of insulin (Chronic)    BMI 40.0-44.9, adult - Primary       GI    Fatty liver    Relevant Orders    ISAAC Screen w/Reflex    Alpha-1-Antitrypsin    Antimitochondrial Antibody    Anti-Smooth Muscle Antibody    Ceruloplasmin    IgG    Tissue Transglutaminase, IgA    Phosphatidylethanol (PETH)    Hepatitis B Surface Antigen    Hepatitis C Antibody    Iron and TIBC    Hepatomegaly    DAVIS (nonalcoholic steatohepatitis)         Yolanda was seen today for fatty liver.    Diagnoses and all orders for this visit:    BMI 40.0-44.9, adult    DAVIS (nonalcoholic steatohepatitis)  -     Ambulatory referral/consult to Hepatology    Fatty liver  -     ISAAC Screen w/Reflex; Future  -     Alpha-1-Antitrypsin; Future  -     Antimitochondrial Antibody; Future  -     Anti-Smooth Muscle Antibody; Future  -     Ceruloplasmin; Future  -     IgG; Future  -     Tissue Transglutaminase, IgA; Future  -     Phosphatidylethanol (PETH); Future  -     Hepatitis B Surface Antigen; Future  -     Hepatitis C Antibody; Future  -     Iron and TIBC; Future    Hepatomegaly    Metabolic dysfunction-associated  steatotic liver disease (MASLD)    Essential hypertension    Type 2 diabetes mellitus with hyperglycemia, with long-term current use of insulin      Asthma and SOB and ALEN  Follow up pulmonology  Discussed hypoxia as risk factor for fatty liver progression      MAFLD  Fatty liver disease is a diagnosis of exclusion, will obtain additional labs to exclude autoimmune/infectitious etiology  Educated patient on spectrum of fatty liver disease and potential for cirrhosis if DAVIS present  --Plan checking serologies to rule out other causes of chronic liver diseases   -Fibro scan results discussed and plan to monitor.   -Discussed new Medication for MASLD. At present not a candidate considering low fibrosis score    I recommended her a more pragmatic approach to her medical problems which would include the following:  - life-style modifications: weight loss, daily exercise (30 mins of HIIT or cardio), low carb/low fat diet         Educated patient on spectrum of fatty liver disease and potential for cirrhosis if MASH present        Explored dietary habits and discussed dietary recommendations- Low calorie, low carbohydrate, high protein diet mediterranean with goal of loosing >3-5% to improve steatosis and >7-10% to improve histological changes if present  - control of diabetes or insulin resistance   - control of high cholesterol, if needed with statin drugs or other cholesterol-lowering agents  - coffee consumption (low caloric): 2-3 cups per day may reduce liver fat  -I will defer the management of the patient's dyslipidemia and diabetes to patient's primary care physician and/or endocrinologist   -Reduction of risk factors for CVD    Obesity  Patient would benefit from weight loss and has tried to set realistic goals to achieve success. Lifestyle changes were discussed on eating healthy, exercising at least 150 minutes weekly, and reducing sedentary behavior.   Discussed the risk factors associated with obesity:  Arthritis/ALEN/Diabetes/Fatty Liver/Cardiovascular disease/GERD/HTN/HLP.     DM  Chronic; stable on current treatment plan; Encourage to discuss with PCP  Education given    Constipation  Continue Prucalopride    GERD  Controlled with PPI    Colon Polyps  Surveillance discussed    Return to clinic in 12 months.    I have sent communication to the referring physician and/or primary care provider.      Time Statement  A total time spent includes time preparing to see patient, reviewing  diagnostic studies and records, direct face-to-face visit, completing orders, medications , reconciliation, prescription management, and care coordination    We discussed in depth the nature of the patient's disease, the management plan in details. I have provided the patient with an opportunity to ask questions and have all questions answered to his satisfaction.     Discussed with patient that it is likely that she will see results before Myself or my nurse are able to view them and report results due to the Cures Act passed 4/1/21. Results will be sent immediately to the patient who are enrolled in the patient portal. If results come through after business hours or on weekend, we will not see them until the next business day that we are in the office. If resulted during the business day, we will likely not be able to review them until after completing all patient visits in office that day.       Gray Mccabe MD  Transplant Hepatologist and Gastroenterologist  [facility]

## 2024-09-18 NOTE — TELEPHONE ENCOUNTER
Reason for Disposition   Caller has NON-URGENT medication question about med that PCP prescribed and triager unable to answer question    Additional Information   Negative: Unconscious or difficult to awaken   Negative: Acting confused (e.g., disoriented, slurred speech)   Negative: Very weak (can't stand)   Negative: Sounds like a life-threatening emergency to the triager   Negative: Vomiting and signs of dehydration (e.g., very dry mouth, lightheaded, dark urine)   Negative: Blood glucose > 240 mg/dL (13.3 mmol/L) and rapid breathing   Negative: Blood glucose > 500 mg/dL (27.8 mmol/L)   Negative: Blood glucose > 240 mg/dL (13.3 mmol/L) AND urine ketones moderate-large (or more than 1+)   Negative: Blood glucose > 240 mg/dL (13.3 mmol/L) and blood ketones > 1.4 mmol/L   Negative: Blood glucose > 240 mg/dL (13.3 mmol/L) AND vomiting AND unable to check for ketones (in blood or urine)   Negative: Vomiting lasting > 4 hours   Negative: Patient sounds very sick or weak to the triager   Negative: Fever > 100.4 F (38.0 C)   Negative: Caller has URGENT medication or insulin pump question and triager unable to answer question   Negative: Blood glucose > 400 mg/dL (22.2 mmol/L)   Negative: Blood glucose > 300 mg/dL (16.7 mmol/L) AND two or more times in a row   Negative: Urine ketones moderate - large (or blood ketones > 1.4 mmol/L)   Negative: Symptoms of high blood sugar (e.g., abnormally thirsty, frequent urination, weight loss) and not able to test blood glucose, AND pregnant   Negative: Blood glucose > 240 mg/dL (13.3 mmol/L) AND pregnant   Negative: Symptoms of high blood sugar (e.g., abnormally thirsty, frequent urination, weight loss) and not able to test blood glucose   Negative: New-onset diabetes mellitus suspected (e.g., frequent urination, weak, weight loss)   Negative: Patient wants to be seen    Protocols used: Diabetes - High Blood Sugar-A-OH  Pt states she has a Dexcom and an insulin pump. Reports her blood  sugar is 212. Pt denies symptoms. Advised per the Triage Care advice. Caller states she is a pt of [facility] [employer]. Advised this message will go to the Provider and office staff now to contact her directly. Instructed to call [facility] back if symptoms develop or for BS over 300. Pt verbalized understanding.

## 2024-09-18 NOTE — TELEPHONE ENCOUNTER
Nurse return the call to the patient. She stated that she is having increase anxiety. She stated that she has to have a heart cath done on 9/37/24. She stated that she is stressing due to all the medical issues she has. She stated that she is having so many panic attacks. Nurse asked how many. She stated that she woke up at 6 am this morning and had a long one.

## 2024-09-18 NOTE — TELEPHONE ENCOUNTER
Spoke with patient and she states her blood sugar are running high and she can't get them down. Stating they have been running 199-300. Patient also states that she is scheduled to have heart Cath surgery 09/27/2024    Dexcom report uploaded into media

## 2024-09-18 NOTE — PATIENT INSTRUCTIONS
MAFLD    Website with information about fatty liver and inflammation related to fatty liver (DAVIS) = [email or web address]  AND [email or web address]     Limit alcohol consumption  2.  Weight loss goal & 7-10%, referral for [facility] if interested. Also, if interested in a dietician visit to create a weight loss plan, contact the dietician team at [facility] at [email or web address] to schedule a visit to you can call [facility] in [place]: [phone] and the  will transfer the call to one of the dieticians to schedule an appointment. Or you can also call [phone] to schedule. They do offer video visits   3. Low carb/sugar, high fiber and protein diet.Try to limit your carb intake to LESS than 30-45 grams of carbs with a meal or LESS than 5-10 grams with any snack (total of any snack foods eaten during that time). Use MyFitness Pal gill to add up your carbs through the day. Do NOT drink any beverages with calories or carbs (this can lead to high blood sugar and weight gain). Also, some of our patients have been very successful with weight loss using the pre made/planned meal planning services that limit calories and portion size (one example is Sensible Meals but there are many out there)  4. Exercise, 5 days per week, 30 minutes per day, as tolerated  5. Recommend good cholesterol, blood pressure, blood sugar levels .      Management of patients with MASLD includes optimization of blood glucose control in patients with diabetes and treatment of hyperlipidemia. If after a year of adhering to these measures and effectively reducing your Body Mass Index plus decreasing the insulin resistance, you continue to have symptoms, we may need to start medications to treat MASLD if there is liver fibrosis. These medications are used to treat diabetes and work well for insulin resistance. Unfortunately, these medications can have side effects.      In some people, fatty liver can  progress to steatohepatitis (inflamatory fatty liver) and possibly to cirrhosis, putting one at increased risk for liver cancer, liver failure, and death. Therefore, the lifestyle changes are very important to decrease this risk.        Once again, we extend our sincere appreciation for giving us the opportunity to serve you. If there is anything else we can do to improve your experience or assist you further, please do not hesitate to reach out to us.       Thanks for trusting us with your healthcare needs and using [facility]. If you want to ask us a question, you can do so by replying to this message or by calling [phone]

## 2024-09-19 ENCOUNTER — HOSPITAL ENCOUNTER (EMERGENCY)
Facility: HOSPITAL | Age: 54
Discharge: HOME | End: 2024-09-19
Attending: EMERGENCY MEDICINE
Payer: MEDICAID

## 2024-09-19 ENCOUNTER — TELEPHONE (OUTPATIENT)
Dept: CARDIOLOGY | Facility: CLINIC | Age: 54
End: 2024-09-19
Payer: MEDICAID

## 2024-09-19 VITALS
DIASTOLIC BLOOD PRESSURE: 88 MMHG | HEART RATE: 84 BPM | TEMPERATURE: 98 F | RESPIRATION RATE: 18 BRPM | OXYGEN SATURATION: 98 % | SYSTOLIC BLOOD PRESSURE: 147 MMHG

## 2024-09-19 DIAGNOSIS — R07.89 CHEST WALL PAIN: Primary | ICD-10-CM

## 2024-09-19 DIAGNOSIS — R07.9 CHEST PAIN: ICD-10-CM

## 2024-09-19 LAB
ALBUMIN SERPL BCP-MCNC: 3.5 G/DL (ref 3.5–5.2)
ALP SERPL-CCNC: 88 U/L (ref 55–135)
ALT SERPL W/O P-5'-P-CCNC: 17 U/L (ref 10–44)
ANION GAP SERPL CALC-SCNC: 12 MMOL/L (ref 8–16)
AST SERPL-CCNC: 16 U/L (ref 10–40)
BASOPHILS # BLD AUTO: 0 K/UL (ref 0–0.2)
BASOPHILS NFR BLD: 0 % (ref 0–1.9)
BILIRUB SERPL-MCNC: 0.3 MG/DL (ref 0.1–1)
BNP SERPL-MCNC: <10 PG/ML (ref 0–99)
BUN SERPL-MCNC: 7 MG/DL (ref 6–20)
CALCIUM SERPL-MCNC: 9.5 MG/DL (ref 8.7–10.5)
CHLORIDE SERPL-SCNC: 103 MMOL/L (ref 95–110)
CO2 SERPL-SCNC: 25 MMOL/L (ref 23–29)
CREAT SERPL-MCNC: 0.7 MG/DL (ref 0.5–1.4)
DIFFERENTIAL METHOD BLD: ABNORMAL
EOSINOPHIL # BLD AUTO: 0 K/UL (ref 0–0.5)
EOSINOPHIL NFR BLD: 0 % (ref 0–8)
ERYTHROCYTE [DISTWIDTH] IN BLOOD BY AUTOMATED COUNT: 14.7 % (ref 11.5–14.5)
EST. GFR  (NO RACE VARIABLE): >60 ML/MIN/1.73 M^2
GLUCOSE SERPL-MCNC: 124 MG/DL (ref 70–110)
HCT VFR BLD AUTO: 34.4 % (ref 37–48.5)
HGB BLD-MCNC: 10.3 G/DL (ref 12–16)
IMM GRANULOCYTES # BLD AUTO: 0.02 K/UL (ref 0–0.04)
IMM GRANULOCYTES NFR BLD AUTO: 0.3 % (ref 0–0.5)
LYMPHOCYTES # BLD AUTO: 2.4 K/UL (ref 1–4.8)
LYMPHOCYTES NFR BLD: 34.5 % (ref 18–48)
MCH RBC QN AUTO: 24.1 PG (ref 27–31)
MCHC RBC AUTO-ENTMCNC: 29.9 G/DL (ref 32–36)
MCV RBC AUTO: 80 FL (ref 82–98)
MONOCYTES # BLD AUTO: 0.5 K/UL (ref 0.3–1)
MONOCYTES NFR BLD: 6.4 % (ref 4–15)
NEUTROPHILS # BLD AUTO: 4.2 K/UL (ref 1.8–7.7)
NEUTROPHILS NFR BLD: 58.8 % (ref 38–73)
NRBC BLD-RTO: 0 /100 WBC
OHS QRS DURATION: 78 MS
OHS QRS DURATION: 78 MS
OHS QTC CALCULATION: 444 MS
OHS QTC CALCULATION: 453 MS
PLATELET # BLD AUTO: 360 K/UL (ref 150–450)
PMV BLD AUTO: 9.1 FL (ref 9.2–12.9)
POCT GLUCOSE: 128 MG/DL (ref 70–110)
POCT GLUCOSE: 141 MG/DL (ref 70–110)
POTASSIUM SERPL-SCNC: 4.7 MMOL/L (ref 3.5–5.1)
PROT SERPL-MCNC: 6.9 G/DL (ref 6–8.4)
RBC # BLD AUTO: 4.28 M/UL (ref 4–5.4)
SODIUM SERPL-SCNC: 140 MMOL/L (ref 136–145)
TROPONIN I SERPL DL<=0.01 NG/ML-MCNC: <0.006 NG/ML (ref 0–0.03)
WBC # BLD AUTO: 7.08 K/UL (ref 3.9–12.7)

## 2024-09-19 PROCEDURE — 93005 ELECTROCARDIOGRAM TRACING: CPT

## 2024-09-19 PROCEDURE — 82962 GLUCOSE BLOOD TEST: CPT

## 2024-09-19 PROCEDURE — 84484 ASSAY OF TROPONIN QUANT: CPT | Performed by: NURSE PRACTITIONER

## 2024-09-19 PROCEDURE — 83880 ASSAY OF NATRIURETIC PEPTIDE: CPT | Performed by: EMERGENCY MEDICINE

## 2024-09-19 PROCEDURE — 80053 COMPREHEN METABOLIC PANEL: CPT | Performed by: NURSE PRACTITIONER

## 2024-09-19 PROCEDURE — 85025 COMPLETE CBC W/AUTO DIFF WBC: CPT | Performed by: EMERGENCY MEDICINE

## 2024-09-19 PROCEDURE — 25000003 PHARM REV CODE 250: Performed by: EMERGENCY MEDICINE

## 2024-09-19 PROCEDURE — 93010 ELECTROCARDIOGRAM REPORT: CPT | Mod: ,,, | Performed by: INTERNAL MEDICINE

## 2024-09-19 PROCEDURE — 99285 EMERGENCY DEPT VISIT HI MDM: CPT | Mod: 25

## 2024-09-19 PROCEDURE — 93010 ELECTROCARDIOGRAM REPORT: CPT | Mod: 59,,, | Performed by: INTERNAL MEDICINE

## 2024-09-19 RX ORDER — TRAMADOL HYDROCHLORIDE 50 MG/1
50 TABLET ORAL EVERY 6 HOURS PRN
Qty: 12 TABLET | Refills: 0 | Status: SHIPPED | OUTPATIENT
Start: 2024-09-19

## 2024-09-19 RX ORDER — LIDOCAINE HYDROCHLORIDE 20 MG/ML
15 SOLUTION OROPHARYNGEAL ONCE
Status: DISCONTINUED | OUTPATIENT
Start: 2024-09-19 | End: 2024-09-19 | Stop reason: HOSPADM

## 2024-09-19 RX ORDER — ALUMINUM HYDROXIDE, MAGNESIUM HYDROXIDE, AND SIMETHICONE 1200; 120; 1200 MG/30ML; MG/30ML; MG/30ML
30 SUSPENSION ORAL ONCE
Status: DISCONTINUED | OUTPATIENT
Start: 2024-09-19 | End: 2024-09-19 | Stop reason: HOSPADM

## 2024-09-19 RX ORDER — TRAMADOL HYDROCHLORIDE 50 MG/1
50 TABLET ORAL
Status: COMPLETED | OUTPATIENT
Start: 2024-09-19 | End: 2024-09-19

## 2024-09-19 RX ADMIN — TRAMADOL HYDROCHLORIDE 50 MG: 50 TABLET, COATED ORAL at 03:09

## 2024-09-19 NOTE — FIRST PROVIDER EVALUATION
" Emergency Department TeleTriage Encounter Note      CHIEF COMPLAINT    Chief Complaint   Patient presents with    Chest Pain     Pt brought in by EMS for chest pain and shortness of breath x2 months; has heart cath scheduled for 9/27       VITAL SIGNS   Initial Vitals   BP Pulse Resp Temp SpO2   09/19/24 1041 09/19/24 1041 09/19/24 1041 09/19/24 1042 09/19/24 1042   110/70 100 18 98 °F (36.7 °C) 95 %      MAP       --                   ALLERGIES    Review of patient's allergies indicates:   Allergen Reactions    Codeine Itching    Hydromorphone Other (See Comments)     Can't wake up for long time if taken    Slow to wake up after surgery after receiving    Aleve [naproxen sodium]      Increases BP    Ibuprofen      Increases BP    Neuromuscular blockers, steroidal Hives     some    Pregabalin Itching    Latex, natural rubber Rash    Morphine Rash     itching    Norco [hydrocodone-acetaminophen] Itching, Rash and Hallucinations    Secobarbital sodium Rash     itching    Tylox [oxycodone-acetaminophen] Rash       PROVIDER TRIAGE NOTE  This is a teletriage evaluation of a 54 y.o. female presenting to the ED complaining of CP and SOB for two months. States that she noticed increased pain last night. Was seen at another facility for same yesterday. Scheduled for cardiac cath later this month.  Pt also has noticed that her heart "feels like it is beating really fast then slow."    Alert, speaking in complete sentences, no distress.     Initial orders will be placed and care will be transferred to an alternate provider when patient is roomed for a full evaluation. Any additional orders and the final disposition will be determined by that provider.         ORDERS  Labs Reviewed   CBC W/ AUTO DIFFERENTIAL   COMPREHENSIVE METABOLIC PANEL   TROPONIN I   B-TYPE NATRIURETIC PEPTIDE   POCT GLUCOSE MONITORING CONTINUOUS       ED Orders (720h ago, onward)      Start Ordered     Status Ordering Provider    09/19/24 1353 09/19/24 " 1353  Vital signs  Every 15 min         Ordered ARUNFARZAD SUJATHA N.    09/19/24 1353 09/19/24 1353  Cardiac Monitoring - Adult  Continuous        Comments: Notify Physician If:    Ordered ALMAJOSE GUADALUPE SUJATHA N.    09/19/24 1353 09/19/24 1353  Pulse Oximetry Continuous  Continuous         Ordered KIRILLCHANELL SUJATHA N.    09/19/24 1353 09/19/24 1353  Diet NPO  Diet effective now         Ordered DARELISE SUJATHA N.    09/19/24 1353 09/19/24 1353  Saline lock IV  Once         Ordered BITAMARIAMA SUJATHA N.    09/19/24 1353 09/19/24 1353  EKG 12-lead  Once        Comments: Do not perform if previously done during this visit/ triage    Ordered ALMAJOSE GUADALUPE SUJATHA N.    09/19/24 1353 09/19/24 1353  CBC auto differential  STAT         Ordered BITAMARIAMA SUJATHA N.    09/19/24 1353 09/19/24 1353  Comprehensive metabolic panel  STAT         Ordered ALMAJOSE GUADALUPE SUJATHA N.    09/19/24 1353 09/19/24 1353  Troponin I #1  STAT         Ordered BITAMARIAMA SUJATHA N.    09/19/24 1353 09/19/24 1353  BNP  STAT         Ordered KIRILLCHANELL SUJATHA N.    09/19/24 1353 09/19/24 1353  X-Ray Chest AP Portable  1 time imaging         Ordered BITAMARIAMA SUJATHA N.    09/19/24 1353 09/19/24 1353  POCT glucose  Once         Ordered BITAMARIAMA SUJATHA N.    09/19/24 1044 09/19/24 1043  EKG 12-lead  Once         In process RHEA CHACKO              Virtual Visit Note: The provider triage portion of this emergency department evaluation and documentation was performed via [employer], a HIPAA-compliant telemedicine application, in concert with a tele-presenter in the room. A face to face patient evaluation with one of my colleagues will occur once the patient is placed in an emergency department room.      DISCLAIMER: This note was prepared with [employer] voice recognition transcription software. Garbled syntax, mangled pronouns, and other bizarre constructions may be attributed to that software  system.

## 2024-09-19 NOTE — TELEPHONE ENCOUNTER
Contacted patient advised her to go to the ER.patient stated she is going to go.  ----- Message from Sara Matta sent at 9/19/2024  9:25 AM CDT -----  Contact: Yolanda Mathew is calling in regards to her going to ER on 09/18 for chest pain,bad headache,blood pressure 183/113,resp 11 and pulse 120.on oxygen at home is very weak, blood sugar is 333 without eating.please call back at .[phone]                   Thanks  JERALD

## 2024-09-19 NOTE — TELEPHONE ENCOUNTER
Contacted patient she stated she went to the ER yesterday and her blood pressure was 183/113. Patient stated the ER did not do anything for her she currently is having chest pain and is feeling weak. Patient stated she is currently on oxygen and feels like she can't be without it.  ----- Message from Randy Soto sent at 9/19/2024  8:37 AM CDT -----  Contact: marj Guerrero is requesting a call back in regards to her ER visit on yesterday, states that her 183/113. She's currently having chest pains. Please give her a call back at [phone]

## 2024-09-19 NOTE — ED PROVIDER NOTES
SCRIBE #1 NOTE: I, Benja Quintana, am scribing for, and in the presence of, Héctor Hunt MD. I have scribed the entire note.       History     Chief Complaint   Patient presents with    Chest Pain     Pt brought in by EMS for chest pain and shortness of breath x2 months; has heart cath scheduled for 9/27     Review of patient's allergies indicates:   Allergen Reactions    Codeine Itching    Hydromorphone Other (See Comments)     Can't wake up for long time if taken    Slow to wake up after surgery after receiving    Aleve [naproxen sodium]      Increases BP    Ibuprofen      Increases BP    Neuromuscular blockers, steroidal Hives     some    Pregabalin Itching    Latex, natural rubber Rash    Morphine Rash     itching    Norco [hydrocodone-acetaminophen] Itching, Rash and Hallucinations    Secobarbital sodium Rash     itching    Tylox [oxycodone-acetaminophen] Rash         History of Present Illness     HPI    9/19/2024, 5:35 PM  History obtained from the patient      History of Present Illness: Yolanda Betts is a 54 y.o. female patient with a PMHx of anemia, anxiety, HTN,schizoaffective disorder, DM type II, HTN, HLD, bipolar 1 disorder, and GERD who presents to the Emergency Department for evaluation of CP which onset gradually. CP is reproducible with palpation. Pain is a 9/10 currently but was a 10/10 in the lobby. Pt has decreased appetite associated with the pain. Pt last had a BM yesterday. Pt has a heart cath scheduled 9/27. Symptoms are constant and moderate in severity. No mitigating or exacerbating factors reported. No additional associated sxs. Patient denies any leg swelling, bloody stool, fever, dysuria, and all other sxs at this time. No Prior Tx. Pt is not allergic to Tramadol but is allergic to Aleve, Tylenol, and many other common OTC pain medications. No further complaints or concerns at this time.       Arrival mode: EMS    PCP: Robe Britton MD        Past Medical History:  Past  Medical History:   Diagnosis Date    Abnormal Pap smear of cervix     HPV genital warts    Anemia     Anxiety     Arthritis     Asthma 10/11/2016    Bipolar 1 disorder     Diabetes mellitus, type 2     Dyslipidemia associated with type 2 diabetes mellitus 2019    Dyspnea due to COVID-19 2024    General anesthetics causing adverse effect in therapeutic use     Genital warts     GERD (gastroesophageal reflux disease)     Herpes simplex virus (HSV) infection     Hyperlipidemia     Hypertension     Hypertension complicating diabetes 2019    Migraine with aura and without status migrainosus, not intractable 2016    Mild persistent asthma without complication 10/11/2016    Morbid obesity with body mass index (BMI) of 45.0 to 49.9 in adult 2017    Obstructive sleep apnea     ALEN (obstructive sleep apnea)     2L per N/C q HS    Schizoaffective disorder, bipolar type 2019    Seasonal allergic rhinitis due to pollen 2019    Type 2 diabetes mellitus with hyperglycemia, with long-term current use of insulin 2017    Type 2 diabetes mellitus with hyperglycemia, without long-term current use of insulin 2017       Past Surgical History:  Past Surgical History:   Procedure Laterality Date    abcess removed right back      BELT ABDOMINOPLASTY  1996    BREAST SURGERY Bilateral     Reduction    CARPAL TUNNEL RELEASE Bilateral 2023    Procedure: RELEASE, CARPAL TUNNEL;  Surgeon: Neville Roth MD;  Location: [facility];  Service: Orthopedics;  Laterality: Bilateral;  bilateral carpal tunnel release     SECTION      X 2    COLONOSCOPY      COLONOSCOPY N/A 2018    Procedure: colonoscopy for iron deficiency anemia;  Surgeon: Frankie Sanchez MD;  Location: [facility];  Service: Endoscopy;  Laterality: N/A;    COLONOSCOPY N/A 2018    Procedure: COLONOSCOPY;  Surgeon: Frankie Sanchez MD;  Location: [facility];  Service: Endoscopy;  Laterality: N/A;  "   COLONOSCOPY N/A 3/10/2023    Procedure: COLONOSCOPY;  Surgeon: Lalita Montes De Oca MD;  Location: [facility] ENDO;  Service: Endoscopy;  Laterality: N/A;    COLONOSCOPY N/A 4/4/2024    Procedure: COLONOSCOPY;  Surgeon: Tara Og MD;  Location: [facility] ENDO;  Service: Endoscopy;  Laterality: N/A;    EPIDURAL STEROID INJECTION INTO CERVICAL SPINE N/A 1/31/2023    Procedure: C6/7 IL ROCAEL RN IV Sedation;  Surgeon: Otto Phillips MD;  Location: [facility] PAIN MGT;  Service: Pain Management;  Laterality: N/A;    EPIDURAL STEROID INJECTION INTO CERVICAL SPINE N/A 1/30/2024    Procedure: C5/6 IL ROCAEL;  Surgeon: Otto Phillips MD;  Location: [facility] PAIN [facility]GT;  Service: Pain Management;  Laterality: N/A;    ESOPHAGOGASTRODUODENOSCOPY N/A 3/10/2023    Procedure: EGD (ESOPHAGOGASTRODUODENOSCOPY);  Surgeon: Lalita Montes De Oca MD;  Location: [facility];  Service: Endoscopy;  Laterality: N/A;    HYSTERECTOMY      w/ BSO; hypermenorrhea    INJECTION OF ANESTHETIC AGENT AROUND MEDIAL BRANCH NERVES INNERVATING CERVICAL FACET JOINT Bilateral 12/19/2023    Procedure: Bilateral C5-7 MBB (diagnostic);  Surgeon: Otto Phillips MD;  Location: [facility] PAIN [facility]GT;  Service: Pain Management;  Laterality: Bilateral;    MOUTH SURGERY  1996    OOPHORECTOMY      hyst/bso, hypermenorrhea    ROBOT-ASSISTED CHOLECYSTECTOMY USING DA DARI XI N/A 1/3/2020    Procedure: XI ROBOTIC CHOLECYSTECTOMY;  Surgeon: Damir Driscoll MD;  Location: [facility] OR;  Service: General;  Laterality: N/A;    TOTAL REDUCTION MAMMOPLASTY      TUBAL LIGATION           Family History:  Family History   Problem Relation Name Age of Onset    Diabetes Mother      Hyperlipidemia Mother      Hypertension Mother      Asthma Mother      COPD Mother      Glaucoma Mother      Thyroid disease Mother      Anesthesia problems Mother          "almost had a cardiac arrest" , blood clots    Hypertension Father      Hyperlipidemia Father      Cancer Father          Brain, lung, liver, kidney    No " Known Problems Sister      Hypertension Brother      Alcohol abuse Brother      Heart disease Maternal Grandmother      Hyperlipidemia Maternal Grandmother      Hypertension Maternal Grandmother      Cataracts Maternal Grandmother      Diabetes Maternal Grandmother      Heart disease Maternal Grandfather      Hyperlipidemia Maternal Grandfather      Hypertension Maternal Grandfather      Glaucoma Maternal Grandfather      Cancer Maternal Grandfather      Cataracts Maternal Grandfather      Macular degeneration Maternal Grandfather      Diabetes Maternal Grandfather      Heart disease Paternal Grandmother      Hyperlipidemia Paternal Grandmother      Hypertension Paternal Grandmother      Cataracts Paternal Grandmother      Heart disease Paternal Grandfather      Hyperlipidemia Paternal Grandfather      Hypertension Paternal Grandfather      Cataracts Paternal Grandfather      Breast cancer Maternal Cousin      Breast cancer Maternal Cousin      Breast cancer Maternal Cousin      Breast cancer Maternal Cousin         Social History:  Social History     Tobacco Use    Smoking status: Never    Smokeless tobacco: Never   Substance and Sexual Activity    Alcohol use: Yes     Alcohol/week: 0.0 standard drinks of alcohol     Comment: socially  No alcohol 72h prior to sx    Drug use: No    Sexual activity: Yes     Partners: Male     Birth control/protection: Surgical     Comment: hyst        Review of Systems     Review of Systems   Constitutional:  Positive for appetite change (decreased). Negative for diaphoresis and fever.   HENT:  Negative for congestion and sore throat.    Respiratory:  Negative for shortness of breath.    Cardiovascular:  Positive for chest pain. Negative for leg swelling.   Gastrointestinal:  Positive for abdominal pain (LUQ). Negative for blood in stool, nausea, rectal pain and vomiting.   Genitourinary:  Negative for dysuria.   Musculoskeletal:  Negative for back pain.   Skin:  Negative for rash.    Neurological:  Negative for dizziness, facial asymmetry, weakness and headaches.   Hematological:  Does not bruise/bleed easily.   All other systems reviewed and are negative.       Physical Exam     Initial Vitals   BP Pulse Resp Temp SpO2   09/19/24 1041 09/19/24 1041 09/19/24 1041 09/19/24 1042 09/19/24 1042   110/70 100 18 98 °F (36.7 °C) 95 %      MAP       --                 Physical Exam  Nursing Notes and Vital Signs Reviewed.  Constitutional: Patient is in no acute distress. Obese.  Head: Atraumatic. Normocephalic.  Eyes: PERRL. EOM intact. Conjunctivae are not pale. No scleral icterus.  ENT: Mucous membranes are moist. Oropharynx is clear and symmetric.    Neck: Supple. Full ROM. No lymphadenopathy.  Cardiovascular: Regular rate. Regular rhythm. No murmurs, rubs, or gallops. Distal pulses are 2+ and symmetric.  Pulmonary/Chest: No respiratory distress. Clear to auscultation bilaterally. No wheezing or rales. Chest wall tenderness upon palpation.  Abdominal: Soft and non-distended.  There is no tenderness.  No rebound, guarding, or rigidity. Good bowel sounds.  Genitourinary: No CVA tenderness  Musculoskeletal: Moves all extremities. No obvious deformities. No edema. No calf tenderness.  Skin: Warm and dry.  Neurological:  Alert, awake, and appropriate.  Normal speech.  No acute focal neurological deficits are appreciated.  Psychiatric: Normal affect. Good eye contact. Appropriate in content.     ED Course   Procedures  ED Vital Signs:  Vitals:    09/19/24 1041 09/19/24 1042 09/19/24 1445 09/19/24 1500   BP: 110/70  (!) 163/83 128/81   Pulse: 100  80 79   Resp: 18  13 14   Temp:  98 °F (36.7 °C)     TempSrc:       SpO2:  95% 99% 99%    09/19/24 1546 09/19/24 1555 09/19/24 1754   BP:   (!) 147/88   Pulse: 68  84   Resp:  18 18   Temp:   98.2 °F (36.8 °C)   TempSrc:   Oral   SpO2:   98%       Abnormal Lab Results:  Labs Reviewed   COMPREHENSIVE METABOLIC PANEL - Abnormal       Result Value    Sodium 140       Potassium 4.7      Chloride 103      CO2 25      Glucose 124 (*)     BUN 7      Creatinine 0.7      Calcium 9.5      Total Protein 6.9      Albumin 3.5      Total Bilirubin 0.3      Alkaline Phosphatase 88      AST 16      ALT 17      eGFR >60      Anion Gap 12     CBC W/ AUTO DIFFERENTIAL - Abnormal    WBC 7.08      RBC 4.28      Hemoglobin 10.3 (*)     Hematocrit 34.4 (*)     MCV 80 (*)     MCH 24.1 (*)     MCHC 29.9 (*)     RDW 14.7 (*)     Platelets 360      MPV 9.1 (*)     Immature Granulocytes 0.3      Gran # (ANC) 4.2      Immature Grans (Abs) 0.02      Lymph # 2.4      Mono # 0.5      Eos # 0.0      Baso # 0.00      nRBC 0      Gran % 58.8      Lymph % 34.5      Mono % 6.4      Eosinophil % 0.0      Basophil % 0.0      Differential Method Automated     POCT GLUCOSE - Abnormal    POCT Glucose 141 (*)    POCT GLUCOSE - Abnormal    POCT Glucose 128 (*)    TROPONIN I    Troponin I <0.006     B-TYPE NATRIURETIC PEPTIDE    BNP <10          All Lab Results:  Results for orders placed or performed during the hospital encounter of 09/19/24   Comprehensive metabolic panel   Result Value Ref Range    Sodium 140 136 - 145 mmol/L    Potassium 4.7 3.5 - 5.1 mmol/L    Chloride 103 95 - 110 mmol/L    CO2 25 23 - 29 mmol/L    Glucose 124 (H) 70 - 110 mg/dL    BUN 7 6 - 20 mg/dL    Creatinine 0.7 0.5 - 1.4 mg/dL    Calcium 9.5 8.7 - 10.5 mg/dL    Total Protein 6.9 6.0 - 8.4 g/dL    Albumin 3.5 3.5 - 5.2 g/dL    Total Bilirubin 0.3 0.1 - 1.0 mg/dL    Alkaline Phosphatase 88 55 - 135 U/L    AST 16 10 - 40 U/L    ALT 17 10 - 44 U/L    eGFR >60 >60 mL/min/1.73 m^2    Anion Gap 12 8 - 16 mmol/L   Troponin I #1   Result Value Ref Range    Troponin I <0.006 0.000 - 0.026 ng/mL   Brain natriuretic peptide   Result Value Ref Range    BNP <10 0 - 99 pg/mL   CBC auto differential   Result Value Ref Range    WBC 7.08 3.90 - 12.70 K/uL    RBC 4.28 4.00 - 5.40 M/uL    Hemoglobin 10.3 (L) 12.0 - 16.0 g/dL    Hematocrit 34.4 (L) 37.0 -  48.5 %    MCV 80 (L) 82 - 98 fL    MCH 24.1 (L) 27.0 - 31.0 pg    MCHC 29.9 (L) 32.0 - 36.0 g/dL    RDW 14.7 (H) 11.5 - 14.5 %    Platelets 360 150 - 450 K/uL    MPV 9.1 (L) 9.2 - 12.9 fL    Immature Granulocytes 0.3 0.0 - 0.5 %    Gran # (ANC) 4.2 1.8 - 7.7 K/uL    Immature Grans (Abs) 0.02 0.00 - 0.04 K/uL    Lymph # 2.4 1.0 - 4.8 K/uL    Mono # 0.5 0.3 - 1.0 K/uL    Eos # 0.0 0.0 - 0.5 K/uL    Baso # 0.00 0.00 - 0.20 K/uL    nRBC 0 0 /100 WBC    Gran % 58.8 38.0 - 73.0 %    Lymph % 34.5 18.0 - 48.0 %    Mono % 6.4 4.0 - 15.0 %    Eosinophil % 0.0 0.0 - 8.0 %    Basophil % 0.0 0.0 - 1.9 %    Differential Method Automated    EKG 12-lead   Result Value Ref Range    QRS Duration 78 ms    [facility] QTC Calculation 453 ms   EKG 12-lead   Result Value Ref Range    QRS Duration 78 ms    [facility] QTC Calculation 444 ms   POCT glucose   Result Value Ref Range    POCT Glucose 141 (H) 70 - 110 mg/dL   POCT glucose   Result Value Ref Range    POCT Glucose 128 (H) 70 - 110 mg/dL     *Note: Due to a large number of results and/or encounters for the requested time period, some results have not been displayed. A complete set of results can be found in Results Review.         Imaging Results:  Imaging Results              X-Ray Chest AP Portable (Final result)  Result time 09/19/24 15:53:44      Final result by Neville Preciado MD (09/19/24 15:53:44)                   Impression:      No acute findings.      Electronically signed by: Neville Preciado MD  Date:    09/19/2024  Time:    15:53               Narrative:    EXAMINATION:  XR CHEST AP PORTABLE    CLINICAL HISTORY:  Acute chest pain, Chest Pain;    COMPARISON:  09/04/2024 chest x-ray.    FINDINGS:  Heart size is normal. The lung fields are clear. No acute cardiopulmonary infiltrate.                                       The EKG was ordered, reviewed, and independently interpreted by the ED provider.  Interpretation time: 10:52  Rate: 103 BPM  Rhythm: sinus  tachycardia  Interpretation: Poor R wave progression. Nonspecific ST wave abnormality. No STEMI.    The EKG was ordered, reviewed, and independently interpreted by the ED provider.  Interpretation time: 11:47  Rate: 97 BPM  Rhythm: normal sinus rhythm  Interpretation: Poor R wave progression. Nonspecific ST wave abnormality. No STEMI.                 The Emergency Provider reviewed the vital signs and test results, which are outlined above.     ED Discussion       5:38 PM: Reassessed pt at this time.  Discussed with pt all pertinent ED information and results. Discussed pt dx and plan of tx. Gave pt all f/u and return to the ED instructions. All questions and concerns were addressed at this time. Pt expresses understanding of information and instructions, and is comfortable with plan to discharge. Pt is stable for discharge.    I discussed with patient and/or family/caretaker that evaluation in the ED does not suggest any emergent or life threatening medical conditions requiring immediate intervention beyond what was provided in the ED, and I believe patient is safe for discharge.  Regardless, an unremarkable evaluation in the ED does not preclude the development or presence of a serious of life threatening condition. As such, patient was instructed to return immediately for any worsening or change in current symptoms.     ED Course as of 09/21/24 0905   Thu Sep 19, 2024   1658 Troponin I: <0.006 [BIANKA]   1658 BUN: 7 [BIANKA]   1658 Creatinine: 0.7 [BIANKA]   1658 WBC: 7.08 [BIANKA]   1658 Hemoglobin(!): 10.3 [BIANKA]   1658 Hematocrit(!): 34.4 [BIANKA]      ED Course User Index  [BIANKA] Héctor Hunt MD     Medical Decision Making  Problems Addressed:  Chest pain: acute illness or injury that poses a threat to life or bodily functions  Chest wall pain: acute illness or injury that poses a threat to life or bodily functions    Amount and/or Complexity of Data Reviewed  External Data Reviewed: notes.     Details:  Reviewed note from   Stan on September 3rd, 2024. Stress teest completed showed no obvious evidence of myocardial infarction or ischemia.   Labs: ordered. Decision-making details documented in ED Course.  Radiology: ordered and independent interpretation performed. Decision-making details documented in ED Course.     Details: No obvious consolidation noted  ECG/medicine tests: ordered and independent interpretation performed. Decision-making details documented in ED Course.     Details: No STEMI.    Risk  OTC drugs.  Prescription drug management.  Diagnosis or treatment significantly limited by social determinants of health.  Risk Details: I have discussed with patient chest pain precautions, specifically to return for worsening chest pain, shortness of breath, fever, or any concern.  I have low suspicion for cardiopulmonary, vascular, infectious, respiratory, or other emergent medical condition based on my evaluation in the ED.                 ED Medication(s):  Medications   traMADoL tablet 50 mg (50 mg Oral Given 9/19/24 1555)       Discharge Medication List as of 9/19/2024  5:37 PM        START taking these medications    Details   traMADoL (ULTRAM) 50 mg tablet Take 1 tablet (50 mg total) by mouth every 6 (six) hours as needed for Pain., Starting Thu 9/19/2024, Normal              Follow-up Information       Robe Britton MD. Schedule an appointment as soon as possible for a visit in 3 days.    Specialty: Family Medicine  Contact information:  [address] [facility] [address] [phone]               Your cardiologist In 1 week.    Why: Follow up with the cardiologist in 1 week as discussed for recheck.                               Scribe Attestation:   Scribe #1: I performed the above scribed service and the documentation accurately describes the services I performed. I attest to the accuracy of the note.     Attending:   Physician Attestation Statement for Scribe #1: I, Héctor Hunt MD, personally  performed the services described in this documentation, as scribed by Benja Quintana, in my presence, and it is both accurate and complete.           Clinical Impression       ICD-10-CM ICD-9-CM   1. Chest wall pain  R07.89 786.52   2. Chest pain  R07.9 786.50       Disposition:   Disposition: Discharged  Condition: Stable        Héctor Hunt MD  09/21/24 0908

## 2024-09-20 ENCOUNTER — TELEPHONE (OUTPATIENT)
Dept: CARDIOLOGY | Facility: CLINIC | Age: 54
End: 2024-09-20
Payer: MEDICAID

## 2024-09-20 NOTE — TELEPHONE ENCOUNTER
Contacted patient she stated that her bp has come down 145/90 she is still having slight chest pain but it is better than it was yesterday.

## 2024-09-24 ENCOUNTER — TELEPHONE (OUTPATIENT)
Dept: DIABETES | Facility: CLINIC | Age: 54
End: 2024-09-24
Payer: MEDICAID

## 2024-09-24 NOTE — TELEPHONE ENCOUNTER
-patient call returned voiced that she have not eaten since this morning. Patient was not told not to eat. Patient voiced that her blood sugar was currently reading at 120 patient educated that in the event she eats and glucose rises above 400 or higher she is to report to ED     ---- Message from Christy Oramous sent at 9/24/2024  3:40 PM CDT -----  Contact: Pt [phone]  Returning a phone call.    Who left a message for the patient:      Do they know what this is regarding:  yes    Would they like a phone call back or a response via [facility]:  call back

## 2024-09-24 NOTE — TELEPHONE ENCOUNTER
Patient called regarding report of elevated blood glucose of 400 mg/dl patient gave bolus from pump then 5 units of Novolg current reading at the time of call 255 mg/dl patient reports excessive thirst, headache, and weakness.    Please advise patient is scheduled for DM tomorrow and pre op for heart cath on today.     Please see OP5 and Clarity attached

## 2024-09-24 NOTE — TELEPHONE ENCOUNTER
----- Message from Ching Lemon sent at 9/24/2024  1:07 PM CDT -----  Contact: Yolanda Guerrero is needing a call back regarding her blood sugar is to high. Please call back at [phone].    Thank you summer

## 2024-09-25 ENCOUNTER — OFFICE VISIT (OUTPATIENT)
Dept: DIABETES | Facility: CLINIC | Age: 54
End: 2024-09-25
Payer: MEDICAID

## 2024-09-25 DIAGNOSIS — E11.59 HYPERTENSION COMPLICATING DIABETES: ICD-10-CM

## 2024-09-25 DIAGNOSIS — E11.69 TYPE 2 DIABETES MELLITUS WITH OTHER SPECIFIED COMPLICATION, UNSPECIFIED WHETHER LONG TERM INSULIN USE: Primary | ICD-10-CM

## 2024-09-25 DIAGNOSIS — I15.2 HYPERTENSION COMPLICATING DIABETES: ICD-10-CM

## 2024-09-25 PROCEDURE — 1160F RVW MEDS BY RX/DR IN RCRD: CPT | Mod: CPTII,95,, | Performed by: NURSE PRACTITIONER

## 2024-09-25 PROCEDURE — 3044F HG A1C LEVEL LT 7.0%: CPT | Mod: CPTII,95,, | Performed by: NURSE PRACTITIONER

## 2024-09-25 PROCEDURE — 1159F MED LIST DOCD IN RCRD: CPT | Mod: CPTII,95,, | Performed by: NURSE PRACTITIONER

## 2024-09-25 PROCEDURE — 4010F ACE/ARB THERAPY RXD/TAKEN: CPT | Mod: CPTII,95,, | Performed by: NURSE PRACTITIONER

## 2024-09-25 PROCEDURE — 99213 OFFICE O/P EST LOW 20 MIN: CPT | Mod: 95,,, | Performed by: NURSE PRACTITIONER

## 2024-09-25 NOTE — PROGRESS NOTES
Subjective:         Patient ID: Yolanda Betts is a 54 y.o. female.  Patient's current PCP is Robe Britton MD.       Chief Complaint: No chief complaint on file.      HPI  Yolanda Betts is a 54 y.o. Black or  female presenting for a follow up for diabetes. Patient has been diagnosed with diabetes for > 10 years.     Complications related to diabetes:  HTN, Hyperlipidemia, peripheral neuropathy; denies Pancreatitis; denies Gastroparesis; denies DKA; denies Hx/family Hx of MEN2/MTC; reports Frequent UTIs/yeast infections; Bariatric surgery 6/1/21. IBS- diarrhea/constipation    Past failed treatment include:  Jardiance- multiple yeast infections; Victoza - GI upset. GLP-1- not advised due to GI issues (per GI and DM management), Metformin- low BGs    Blood glucose testing is performed regularly with her Dexcom. Bg have been high due to stress.     Compliance:The patient reports medication and diet noncompliance (may eat too little or too much due to stress)      The patient location is: [place], [place]  The chief complaint leading to consultation is: DM follow up    Visit type: audiovisual    Face to Face time with patient: 20 minutes of total time spent on the encounter, which includes face to face time and non-face to face time preparing to see the patient (eg, review of tests), Obtaining and/or reviewing separately obtained history, Documenting clinical information in the electronic or other health record, Independently interpreting results (not separately reported) and communicating results to the patient/family/caregiver, or Care coordination (not separately reported). Each patient to whom he or she provides medical services by telemedicine is:  (1) informed of the relationship between the physician and patient and the respective role of any other health care provider with respect to management of the patient; and (2) notified that he or she may decline to receive medical services by  telemedicine and may withdraw from such care at any time.         //   , There is no height or weight on file to calculate BMI.  Her blood sugar in clinic today is:   Lab Results   Component Value Date    POCGLU 78 03/10/2023       Labs reviewed and are noted below.  Her most recent A1C is:  Lab Results   Component Value Date    HGBA1C 6.6 (H) 06/27/2024    HGBA1C 6.5 (H) 11/29/2023    HGBA1C 5.6 08/08/2023     Lab Results   Component Value Date    CPEPTIDE 4.56 02/26/2018     Lab Results   Component Value Date    GLUTAMICACID 0.00 02/26/2018     Glucose   Date Value Ref Range Status   09/19/2024 124 (H) 70 - 110 mg/dL Final     Anion Gap   Date Value Ref Range Status   09/19/2024 12 8 - 16 mmol/L Final     eGFR if    Date Value Ref Range Status   03/08/2022 >60.0 >60 mL/min/1.73 m^2 Final     eGFR if non    Date Value Ref Range Status   03/08/2022 >60.0 >60 mL/min/1.73 m^2 Final     Comment:     Calculation used to obtain the estimated glomerular filtration  rate (eGFR) is the CKD-EPI equation.          CURRENT DM MEDICATIONS:   Diabetes Medications               glucagon (BAQSIMI) 3 mg/actuation Spry 1 spray by Nasal route as needed (hypoglycemia). For emergency use. May repeat 1 time after 15 minutes    insulin aspart U-100 (NOVOLOG FLEXPEN U-100 INSULIN) 100 unit/mL (3 mL) InPn pen Inject 24 Units into the skin 3 (three) times daily before meals. Use per sliding scale for breakfast and dinner    insulin aspart U-100 (NOVOLOG U-100 INSULIN ASPART) 100 unit/mL injection Inject 100 Units into the skin Every 3 (three) days. To use via insulin pump                   Diabetes Management Status    Statin: Taking  ACE/ARB: Taking    Screening or Prevention Patient's value Goal Complete/Controlled?   HgA1C Testing and Control   Lab Results   Component Value Date    HGBA1C 6.6 (H) 06/27/2024      Annually/Less than 8% Yes   Lipid profile : 04/29/2024 Annually Yes   LDL control Lab Results    Component Value Date    LDLCALC 123 (H) 04/29/2024    Annually/Less than 100 mg/dl  Yes   Nephropathy screening Lab Results   Component Value Date    LABMICR 22.0 12/11/2023     Lab Results   Component Value Date    PROTEINUA Trace (A) 01/23/2024    Annually Yes   Blood pressure BP Readings from Last 1 Encounters:   09/19/24 (!) 147/88    Less than 140/90 Yes   Dilated retinal exam : 03/08/2024 Annually Yes   Foot exam   : 02/16/2023 Annually Yes     Review of Systems   Constitutional:  Positive for appetite change (decreased). Negative for activity change.   Gastrointestinal:  Positive for constipation and diarrhea. Negative for abdominal pain, nausea and vomiting.        Hx of IBS, GERD   Endocrine: Positive for polydipsia. Negative for polyphagia and polyuria.   Musculoskeletal:  Positive for arthralgias.   Neurological:  Negative for syncope and weakness.        Neuropathy   Psychiatric/Behavioral:  Negative for confusion.         Depression         Objective:      Physical Exam  Constitutional:       General: She is not in acute distress.     Appearance: She is not ill-appearing.   Neck:      Thyroid: Mass: neg of self exam.   Neurological:      Mental Status: She is alert.   Psychiatric:         Speech: Speech normal.         Assessment:       1. Type 2 diabetes mellitus with other specified complication, unspecified whether long term insulin use    2. Hypertension complicating diabetes                      Plan:   Type 2 diabetes mellitus with other specified complication, unspecified whether long term insulin use    Hypertension complicating diabetes          OP5  Novolog  Basal 0.35 Units/hr  Insulin : Carb Ratios 24 g/Unit  Sensitivity (ISF, Correction) 52 mg/dL  BG Target Range 110 mg/dL (+0/-0)  BG Correction Threshold 110 mg/dL         PLAN:   - Condition: uncontrolled    - Monitor blood glucose 4x daily. Goals reviewed  - She is working with the RD  - BP and LDL- Recommended lifestyle modifications  for management. Encouraged healthy low fat, low carb diet and increase physical activity  - Pump Changes: lower CF to 42, increase basal rate to 0.40 (when not in automated mode)  - Continue to see the psychiatrist for depression (causing reduced appetite); discussed coping techniques  - The patient was explained the above plan and given opportunity to ask questions.  She understands, chooses and consents to this plan and accepts all the risks, which include but are not limited to the risks mentioned above.   - Labs ordered as above  - Nurse visit:  deferred    - Follow up: 2 weeks and 4 weeks         For pump failure:    Use Novolog Insulin Carb ratio 1:20     Lantus 10 units once daily    Correction scale (for steroid use) :  Novolog every 3 hours using correction scale outside of mealtime.  -200: 2 units  -250: 4 units  -300: 6 units  -350: 8 units  BG greater than 350: 10 units      A total of 20 minutes was spent in face to face time, of which over 50% was spent in counseling patient on disease process, complications, treatment, and side effects of medications.

## 2024-09-27 ENCOUNTER — HOSPITAL ENCOUNTER (OUTPATIENT)
Facility: HOSPITAL | Age: 54
Discharge: HOME | End: 2024-09-27
Attending: INTERNAL MEDICINE | Admitting: INTERNAL MEDICINE
Payer: MEDICAID

## 2024-09-27 ENCOUNTER — DOCUMENTATION ONLY (OUTPATIENT)
Dept: REHABILITATION | Facility: HOSPITAL | Age: 54
End: 2024-09-27
Payer: MEDICAID

## 2024-09-27 DIAGNOSIS — I10 ESSENTIAL HYPERTENSION: ICD-10-CM

## 2024-09-27 DIAGNOSIS — R94.39 ABNORMAL STRESS TEST: ICD-10-CM

## 2024-09-27 DIAGNOSIS — E11.9 HYPERTENSION COMPLICATING DIABETES: ICD-10-CM

## 2024-09-27 DIAGNOSIS — R07.89 OTHER CHEST PAIN: ICD-10-CM

## 2024-09-27 DIAGNOSIS — I51.7 TRANSIENT ISCHEMIC DILATION OF LEFT VENTRICLE OF HEART: ICD-10-CM

## 2024-09-27 DIAGNOSIS — I10 HYPERTENSION COMPLICATING DIABETES: ICD-10-CM

## 2024-09-27 LAB — POCT GLUCOSE: 131 MG/DL (ref 70–110)

## 2024-09-27 PROCEDURE — C1769 GUIDE WIRE: HCPCS | Performed by: INTERNAL MEDICINE

## 2024-09-27 PROCEDURE — 99152 MOD SED SAME PHYS/QHP 5/>YRS: CPT | Mod: ,,, | Performed by: INTERNAL MEDICINE

## 2024-09-27 PROCEDURE — C1894 INTRO/SHEATH, NON-LASER: HCPCS | Performed by: INTERNAL MEDICINE

## 2024-09-27 PROCEDURE — C1887 CATHETER, GUIDING: HCPCS | Performed by: INTERNAL MEDICINE

## 2024-09-27 PROCEDURE — 63600175 PHARM REV CODE 636 W HCPCS: Performed by: INTERNAL MEDICINE

## 2024-09-27 PROCEDURE — 99152 MOD SED SAME PHYS/QHP 5/>YRS: CPT | Performed by: INTERNAL MEDICINE

## 2024-09-27 PROCEDURE — 25000003 PHARM REV CODE 250: Performed by: INTERNAL MEDICINE

## 2024-09-27 PROCEDURE — 25500020 PHARM REV CODE 255: Performed by: INTERNAL MEDICINE

## 2024-09-27 PROCEDURE — 93458 L HRT ARTERY/VENTRICLE ANGIO: CPT | Performed by: INTERNAL MEDICINE

## 2024-09-27 PROCEDURE — 93458 L HRT ARTERY/VENTRICLE ANGIO: CPT | Mod: 26,,, | Performed by: INTERNAL MEDICINE

## 2024-09-27 RX ORDER — LIDOCAINE HYDROCHLORIDE 20 MG/ML
INJECTION, SOLUTION EPIDURAL; INFILTRATION; INTRACAUDAL; PERINEURAL
Status: DISCONTINUED | OUTPATIENT
Start: 2024-09-27 | End: 2024-09-27 | Stop reason: HOSPADM

## 2024-09-27 RX ORDER — SODIUM CHLORIDE 9 MG/ML
INJECTION, SOLUTION INTRAVENOUS CONTINUOUS
Status: ACTIVE | OUTPATIENT
Start: 2024-09-27 | End: 2024-09-27

## 2024-09-27 RX ORDER — FENTANYL CITRATE 50 UG/ML
INJECTION, SOLUTION INTRAMUSCULAR; INTRAVENOUS
Status: DISCONTINUED | OUTPATIENT
Start: 2024-09-27 | End: 2024-09-27 | Stop reason: HOSPADM

## 2024-09-27 RX ORDER — SODIUM CHLORIDE 0.9 % (FLUSH) 0.9 %
10 SYRINGE (ML) INJECTION
Status: DISCONTINUED | OUTPATIENT
Start: 2024-09-27 | End: 2024-09-27 | Stop reason: HOSPADM

## 2024-09-27 RX ORDER — ACETAMINOPHEN 325 MG/1
650 TABLET ORAL EVERY 4 HOURS PRN
Status: DISCONTINUED | OUTPATIENT
Start: 2024-09-27 | End: 2024-09-27 | Stop reason: HOSPADM

## 2024-09-27 RX ORDER — MIDAZOLAM HYDROCHLORIDE 1 MG/ML
INJECTION INTRAMUSCULAR; INTRAVENOUS
Status: DISCONTINUED | OUTPATIENT
Start: 2024-09-27 | End: 2024-09-27 | Stop reason: HOSPADM

## 2024-09-27 RX ORDER — VERAPAMIL HYDROCHLORIDE 2.5 MG/ML
INJECTION, SOLUTION INTRAVENOUS
Status: DISCONTINUED | OUTPATIENT
Start: 2024-09-27 | End: 2024-09-27 | Stop reason: HOSPADM

## 2024-09-27 RX ORDER — DIAZEPAM 5 MG/1
5 TABLET ORAL ONCE
Status: COMPLETED | OUTPATIENT
Start: 2024-09-27 | End: 2024-09-27

## 2024-09-27 RX ORDER — SODIUM CHLORIDE 9 MG/ML
INJECTION, SOLUTION INTRAVENOUS CONTINUOUS
Status: DISCONTINUED | OUTPATIENT
Start: 2024-09-27 | End: 2024-09-27 | Stop reason: HOSPADM

## 2024-09-27 RX ORDER — DIPHENHYDRAMINE HCL 50 MG
50 CAPSULE ORAL ONCE
Status: COMPLETED | OUTPATIENT
Start: 2024-09-27 | End: 2024-09-27

## 2024-09-27 RX ORDER — HEPARIN SODIUM 1000 [USP'U]/ML
INJECTION, SOLUTION INTRAVENOUS; SUBCUTANEOUS
Status: DISCONTINUED | OUTPATIENT
Start: 2024-09-27 | End: 2024-09-27 | Stop reason: HOSPADM

## 2024-09-27 RX ORDER — ONDANSETRON 8 MG/1
8 TABLET, ORALLY DISINTEGRATING ORAL EVERY 8 HOURS PRN
Status: DISCONTINUED | OUTPATIENT
Start: 2024-09-27 | End: 2024-09-27 | Stop reason: HOSPADM

## 2024-09-27 RX ORDER — NITROGLYCERIN 5 MG/ML
INJECTION, SOLUTION INTRAVENOUS
Status: DISCONTINUED | OUTPATIENT
Start: 2024-09-27 | End: 2024-09-27 | Stop reason: HOSPADM

## 2024-09-27 RX ORDER — SODIUM CHLORIDE 9 MG/ML
INJECTION, SOLUTION INTRAVENOUS
Status: DISCONTINUED | OUTPATIENT
Start: 2024-09-27 | End: 2024-09-27 | Stop reason: HOSPADM

## 2024-09-27 RX ADMIN — DIPHENHYDRAMINE HYDROCHLORIDE 50 MG: 50 CAPSULE ORAL at 08:09

## 2024-09-27 RX ADMIN — SODIUM CHLORIDE: 9 INJECTION, SOLUTION INTRAVENOUS at 08:09

## 2024-09-27 RX ADMIN — DIAZEPAM 5 MG: 5 TABLET ORAL at 08:09

## 2024-09-27 RX ADMIN — ONDANSETRON 8 MG: 8 TABLET, ORALLY DISINTEGRATING ORAL at 10:09

## 2024-09-27 NOTE — PLAN OF CARE
Dressing to right wrist m, C/D/I with no bleeding/redness/swelling noted; right wrist immobilizer intact. VSS, NADN, and pt meets criteria for discharge. Discharge instructions given to and reviewed with pt, and pt verbalized understanding of all. Pt discharged to home, taken out via wheelchair and driven home by daughter.

## 2024-09-27 NOTE — DISCHARGE INSTRUCTIONS
"Post-op Heart Catheterization    1. DIET: It is advisable for you to follow a diet that limits the intake of salt, sugar, saturated fats and cholesterol.     2. DRIVING: Due to sedation you received during your procedure, DO NOT drive or operate machinery for 24 hours. Avoid making critical decisions or signing legal documents until tomorrow.    3. ACTIVITY: AVOID activities that require bending of the affected arm/wrist for 3 days and submerging the site in water for 3 days. REMOVE the dressing the day after  the procedure and shower and apply a bandaid to the site. You may RESUME your normal activities or prescribed exercise program as instructed by your physician after 3 days.                                                                                                                 4. WOUND CARE: It is not unusual to have a small amount of bruising to appear at or near the puncture site. It is also common to have a tender "knot" develop beneath the skin at the puncture site of the wrist/arm. This is usually scar tissue and is not a cause for concern or alarm. This tender knot may take several weeks to fully resolve. The bruise will usually spread over several days. However, if the lump gets bigger, call your doctor immediately.    5. DISCOMFORT: For general discomfort at the puncture site, you may take 1 or 2 Acetaminophen (Tylenol) tablets every 4 - 6 hours as needed. (Do not take more than 4000 mg a day)    6. SIGNS AND SYMPTOMS TO REPORT:  Call your physician immediately if any of the following occur:                                            1. Loss of feeling, warmth or color to the affected arm/wrist                                                                                                          2. Mild beeding from the site                 3. Pain that is sudden, sharp or persistent in the affected arm/wrist                 4. Swelling or a change in "lump" size, increased redness or drainage " at the puncture site                                                                               5. High fever (101 degrees or higher)    7. GO TO  THE EMERGENCY ROOM OR CALL 911 IF YOU HAVE: Chest pains or discomforts not relieved with 3 nitroglycerin doses (sublingual tablets or spray), numbness or severe pain of limb, if your limb becomes cold or discolored or if you develop uncontrolled bleeding from the puncture site (quickly apply firm, direct pressure above the site).

## 2024-09-27 NOTE — PLAN OF CARE
VSS, No distress noted, discharge instructions provided to patient and family. Both verbalized understanding. Sitting up eating. Tolerating well.

## 2024-09-27 NOTE — DISCHARGE SUMMARY
[facility] ([facility])  Cardiology  Discharge Summary      Patient Name: Yolanda Betts  MRN: [id number]  Admission Date: 9/27/2024  Hospital Length of Stay: 0 days  Discharge Date and Time:  09/27/2024 10:26 AM  Attending Physician: Jaimie Wills MD    Discharging Provider: Jaimie Wills MD  Primary Care Physician: Robe Britton MD    HPI:   No notes on file    Procedure(s) (LRB):  Angiogram, Coronary, with Left Heart Cath (N/A)     Indwelling Lines/Drains at time of discharge:  Lines/Drains/Airways       None                   Hospital Course:  No notes on file    Goals of Care Treatment Preferences:  Code Status: Full Code      Consults:     Significant Diagnostic Studies: Results for orders placed during the hospital encounter of 09/27/24    Cardiac catheterization    Conclusion    The left ventricular end diastolic pressure was elevated.    The pre-procedure left ventricular end diastolic pressure was 22.    The estimated blood loss was none.    The coronary arteries were normal..    There was diastolic dysfunction.    The filling pressures on the left were mildly elevated.    The procedure log was documented by Documenter: Elizabeth Rojas RN and verified by Jaimie Wills MD.    Date: 9/27/2024  Time: 10:17 AM      Pending Diagnostic Studies:       None            There are no hospital problems to display for this patient.    Assessment & plan notes cannot be loaded without a specified hospital service.      Discharged Condition: good    Disposition: Home or Self Care    Follow Up:    Patient Instructions:   No discharge procedures on file.  Medications:  Reconciled Home Medications:      Medication List        ASK your doctor about these medications      * albuterol 2.5 mg /3 mL (0.083 %) nebulizer solution  Commonly known as: PROVENTIL  Take 2.5 mg by nebulization every 4 (four) hours as needed. Times a day     * albuterol 90 mcg/actuation inhaler  Commonly known as:  "PROVENTIL/VENTOLIN HFA  Inhale 2 puffs into the lungs every 6 (six) hours as needed for Wheezing.     ALPRAZolam 1 MG tablet  Commonly known as: XANAX  Take 1 tablet (1 mg total) by mouth 2 (two) times daily as needed for Anxiety.     amlodipine-valsartan  mg per tablet  Commonly known as: EXFORGE  Take 1 tablet by mouth once daily.     atorvastatin 20 MG tablet  Commonly known as: LIPITOR  Take 1 tablet (20 mg total) by mouth every evening     azelastine 137 mcg (0.1 %) nasal spray  Commonly known as: ASTELIN  2 sprays (274 mcg total) by Nasal route 2 (two) times daily.     * BAQSIMI 3 mg/actuation Spry  Generic drug: glucagon  1 spray by Nasal route as needed (hypoglycemia). For emergency use. May repeat 1 time after 15 minutes     * BAQSIMI 3 mg/actuation Spry  Generic drug: glucagon  SPRAY 1 SPRAY IN NOSTRIL AS NEEDED FOR EMERGENCY. MAY REPEAT 1 TIME AFTER 15 MINUTES     BD INSULIN SYRINGE ULTRA-FINE 0.5 mL 30 gauge x 1/2" Syrg  Generic drug: insulin syringe-needle U-100     CAPLYTA 10.5 mg Cap  Generic drug: lumateperone  Take 1 capsule (10.5 mg total) by mouth once daily.     celecoxib 100 MG capsule  Commonly known as: CeleBREX  Take 1 capsule (100 mg total) by mouth 2 (two) times daily.     cevimeline 30 mg capsule  Commonly known as: EVOXAC  Take 1 capsule by mouth in the morning and 1 capsule at noon and 1 capsule before bedtime. Do all this for 30 days.     COMP-AIR NEBULIZER COMPRESSOR Mariela  Generic drug: nebulizer and compressor  use as directed     COSENTYX PEN (2 PENS) 150 mg/mL Pnij  Generic drug: secukinumab  Inject 300 mg into the skin every 14 (fourteen) days.     cyclobenzaprine 10 MG tablet  Commonly known as: FLEXERIL  Take 1 tablet (10 mg total) by mouth 3 (three) times daily as needed (Pain).     * DEXCOM G6 SENSOR Mariela  Generic drug: blood-glucose sensor  Use to check bg. Change every 10 days     * DEXCOM G6 SENSOR Mariela  Generic drug: blood-glucose sensor  change sensor every 10 " "days.     * DEXCOM G6 TRANSMITTER Mariela  Generic drug: blood-glucose transmitter  1 each by Misc.(Non-Drug; Combo Route) route every 3 (three) months.     * DEXCOM G6 TRANSMITTER Mariela  Generic drug: blood-glucose transmitter  Use as directed change every 3 months     DEXCOM G7  Misc  Generic drug: blood-glucose meter,continuous  1 each by Misc.(Non-Drug; Combo Route) route once. for 1 dose     DULoxetine 60 MG capsule  Commonly known as: CYMBALTA  Take 1 capsule by mouth 2 times daily     ergocalciferol 50,000 unit Cap  Commonly known as: VITAMIN D2  Take 1 capsule twice weekly for 3 weeks then weekly     estradioL 0.01 % (0.1 mg/gram) vaginal cream  Commonly known as: ESTRACE  Place 1 g vaginally twice a week.     fenofibrate 145 MG tablet  Commonly known as: TRICOR  Take 1 tablet (145 mg total) by mouth once daily.     furosemide 20 MG tablet  Commonly known as: LASIX  Take 1 tablet (20 mg total) by mouth once daily.     insulin syringe-needle U-100 0.3 mL 30 gauge x 5/16" Syrg  1 each by Misc.(Non-Drug; Combo Route) route Every 3 (three) days.     lamoTRIgine 200 MG tablet  Commonly known as: LAMICTAL  Take 1 tablet (200 mg total) by mouth 2 (two) times daily.     LANTUS SOLOSTAR U-100 INSULIN 100 unit/mL (3 mL) Inpn pen  Generic drug: insulin glargine U-100 (Lantus)  Inject up to 50 units daily in event of pump failure     levocetirizine 5 MG tablet  Commonly known as: XYZAL  Take 1 tablet (5 mg total) by mouth every evening.     LIDOcaine 5 %  Commonly known as: LIDODERM  Place 2 patches onto the skin every 12 (twelve) hours as needed (pain). Remove & Discard patch within 12 hours or as directed by MD     magic mouthwash diphen/antac/lidoc/nysta  Swish and spit 5 mLs every 4 (four) hours as needed.     magnesium hydroxide 400 mg/5 ml 400 mg/5 mL Susp  Commonly known as: MILK OF MAGNESIA  Take 5 mLs by mouth every 4 (four) hours as needed.     magnesium oxide 400 mg (241.3 mg magnesium) tablet  Commonly " "known as: MAG-OX  Take 1 tablet (400 mg total) by mouth once daily.     methylPREDNISolone 4 mg tablet  Commonly known as: MEDROL DOSEPACK  use as directed     metoprolol succinate 50 MG 24 hr tablet  Commonly known as: TOPROL-XL  Take 1 tablet (50 mg total) by mouth every morning.     * insulin aspart U-100 100 unit/mL injection  Commonly known as: NovoLOG  NovoLOG FlexPen     * NovoLOG U-100 Insulin aspart 100 unit/mL injection  Generic drug: insulin aspart U-100  To use via insulin pump. Up to 200 units every 3 days     omeprazole 40 MG capsule  Commonly known as: PRILOSEC  Take 1 capsule (40 mg total) by mouth once daily.     OMNIPOD 5 G6 PODS (GEN 5) Crtg  Generic drug: insulin pump cart,automated,BT  Inject into skin every 3 days as directed     * OZEMPIC 2 mg/dose (8 mg/3 mL) Pnij  Generic drug: semaglutide  Inject 2 mg into the skin every 7 days.     * OZEMPIC 2 mg/dose (8 mg/3 mL) Pnij  Generic drug: semaglutide  Inject 2 mg into the skin every 7 days.     pen needle, diabetic 31 gauge x 3/16" Ndle  Commonly known as: BD ULTRA-FINE MINI PEN NEEDLE  Use 1 a day in event of pump failure     polyethylene glycol 17 gram/dose powder  Commonly known as: GLYCOLAX  Take 17 g by mouth 2 (two) times daily.     promethazine 25 MG tablet  Commonly known as: PHENERGAN  Take 25 mg by mouth every 6 (six) hours as needed.     SYMBICORT 160-4.5 mcg/actuation Hfaa  Generic drug: budesonide-formoterol 160-4.5 mcg  Inhale 2 puffs into the lungs in the morning and 2 puffs before bedtime. 2 puffs every 12 hours     traMADoL 50 mg tablet  Commonly known as: ULTRAM  Take 1 tablet (50 mg total) by mouth every 6 (six) hours as needed for Pain.     triamcinolone acetonide 0.1% 0.1 % paste  Commonly known as: KENALOG  Apply coating 2 times daily     XIIDRA 5 % Dpet  Generic drug: lifitegrast  Place 1 drop into both eyes 2 (two) times daily.     zolpidem 5 MG Tab  Commonly known as: AMBIEN  Take 1 tablet (5 mg total) by mouth nightly " as needed (anxiety).           * This list has 12 medication(s) that are the same as other medications prescribed for you. Read the directions carefully, and ask your doctor or other care provider to review them with you.                  Time spent on the discharge of patient: 30 minutes    Jaimie Wills MD  Cardiology  [facility] ([facility])

## 2024-09-27 NOTE — PLAN OF CARE
TR band removed; no bleeding; no hematoma. Pressure dressing applied with immobilizer. Ambulated around unit. Tolerated well. Ready for discharge.

## 2024-09-27 NOTE — PROGRESS NOTES
[facility]  Physical Therapy Discharge Note    Name: Yolanda Martin [facility] Number: [id number]    Therapy Diagnosis:        Encounter Diagnoses   Name Primary?    Chronic bilateral low back pain with bilateral sciatica Yes    Weakness of both lower extremities      Decreased mobility and endurance        Physician: Shaq Anderson MD        Physician Orders: PT Eval and Treat  Medical Diagnosis from Referral:   M79.7 (ICD-10-CM) - Fibromyalgia   M45.9 (ICD-10-CM) - Ankylosing spondylitis, unspecified site of spine   Evaluation Date: 7/30/2024      Date of Last visit: 8/6/2024  Total Visits Received: 2    ASSESSMENT      See daily note    Discharge reason: Patient has not attended therapy since 8/6/2024    Discharge FOTO Score: see daily note    Goals: see daily note    PLAN   This patient is discharged from Physical Therapy      Feroz Arriola PT

## 2024-09-30 ENCOUNTER — TELEPHONE (OUTPATIENT)
Dept: CARDIOLOGY | Facility: CLINIC | Age: 54
End: 2024-09-30
Payer: MEDICAID

## 2024-09-30 DIAGNOSIS — I10 ESSENTIAL HYPERTENSION: Primary | ICD-10-CM

## 2024-09-30 RX ORDER — CLONIDINE HYDROCHLORIDE 0.1 MG/1
0.1 TABLET ORAL 2 TIMES DAILY
Qty: 60 TABLET | Refills: 11 | Status: SHIPPED | OUTPATIENT
Start: 2024-09-30 | End: 2025-09-30

## 2024-09-30 NOTE — TELEPHONE ENCOUNTER
Attempted to contact patient lvm for her to call back    ----- Message from Nathanael sent at 9/30/2024 10:22 AM CDT -----  Contact: [phone]  Type:  Patient Returning Call    Who Called:PILO YANCEY [[id number]]  Who Left Message for Patient:  Does the patient know what this is regarding?:blood pressure is still HIGH 150/90...  and need blood pressure medication adjusted  Would the patient rather a call back or a response via [facility]? Call back  Best Call Back Number: [phone]  Additional Information: n [phone]

## 2024-09-30 NOTE — TELEPHONE ENCOUNTER
Contacted patient regarding blood pressure she stated her BP has stayed high the lowest reading was 150/98 and the highest was 283/114 patient wants to know if her medications can be adjusted.  ----- Message from Baudilio sent at 9/30/2024 11:30 AM CDT -----  Regarding: Callback Request  Contact: Pt [phone]  Type: Returning a call    Who left a message? Mercedes Tapia MA    When did the practice call? Today    Does patient know what this is regarding: Would not say    Would the patient rather a call back or a response via [facility]? Call    Comments:

## 2024-10-01 DIAGNOSIS — E26.9 HYPERALDOSTERONISM: ICD-10-CM

## 2024-10-01 DIAGNOSIS — E87.5 HYPERKALEMIA: ICD-10-CM

## 2024-10-01 DIAGNOSIS — I10 ESSENTIAL HYPERTENSION: Chronic | ICD-10-CM

## 2024-10-02 ENCOUNTER — TELEPHONE (OUTPATIENT)
Dept: CARDIOLOGY | Facility: CLINIC | Age: 54
End: 2024-10-02
Payer: MEDICAID

## 2024-10-02 VITALS
HEIGHT: 56 IN | SYSTOLIC BLOOD PRESSURE: 117 MMHG | RESPIRATION RATE: 20 BRPM | OXYGEN SATURATION: 92 % | DIASTOLIC BLOOD PRESSURE: 68 MMHG | TEMPERATURE: 98 F | BODY MASS INDEX: 46.34 KG/M2 | HEART RATE: 90 BPM | WEIGHT: 206 LBS

## 2024-10-02 RX ORDER — FUROSEMIDE 20 MG/1
20 TABLET ORAL DAILY
Qty: 90 TABLET | Refills: 0 | OUTPATIENT
Start: 2024-10-02

## 2024-10-02 RX ORDER — SPIRONOLACTONE 25 MG/1
25 TABLET ORAL DAILY
Qty: 90 TABLET | Refills: 0 | OUTPATIENT
Start: 2024-10-02

## 2024-10-02 NOTE — TELEPHONE ENCOUNTER
Contacted patient regarding high bp lvm for her to give a call back.    ----- Message from Stacie sent at 10/2/2024  3:06 PM CDT -----  Contact: PILO YANCEY [[id number]]  ..Type:  Patient Requesting Call    Who Called: PILO YANCEY [[id number]]  Does the patient know what this is regarding?: pt had heart cath, BP running high  Would the patient rather a call back or a response via [facility]?  call  Best Call Back Number: .[phone] (home)   Additional Information:   Milagro called and said her mirtazapine needs a PA renewal.

## 2024-10-02 NOTE — TELEPHONE ENCOUNTER
Patient stated her Blood pressure is high pulse is between 85-88 and her diabetes is high as well she stated that she is also having chest pain still.    ----- Message from Stacie sent at 10/2/2024  3:25 PM CDT -----  Contact: PILO YANCEY [[id number]]  .Type:  Patient Returning Call    Who Called:PILO YANCEY [[id number]]  Who Left Message for Patient:  Does the patient know what this is regarding?:   Would the patient rather a call back or a response via [facility]?  call  Best Call Back Number: .[phone] (home)  Additional Information:

## 2024-10-02 NOTE — TELEPHONE ENCOUNTER
Refill Routing Note   Medication(s) are not appropriate for processing by [facility] for the following reason(s):        PCP listed on profile is no longer an [facility] provider; routed to previous prescribing provider.     Outside of protocol  Requirement: Established PCP participating in [facility] program    [facility] action(s):                  Appointments  past 12m or future 3m with PCP    Date Provider   Last Visit   2/14/24 Sasha Sorenson MD   Next Visit   Visit date not found Sasha Sorenson MD   ED visits in past 90 days: 1        Note composed:10:01 PM 10/01/2024

## 2024-10-02 NOTE — TELEPHONE ENCOUNTER
Hyperaldosteronism - pt unaware she had this - many of her symptoms that she has are from this and she states no one has ever done anything about her hyperaldosteronism.     Pt feels this Dx was never addressed and she was never told about it and according to the internet this is why her BP is high and why she has headaches and why she is SOB- so pt wants to know who is going to fix this because so far nothing has helped - she still has HTN, SOB, headaches, etc. - She wants this Dx addressed and to start being treated for it    I explained to pt she should start with her PCP - she wanted us to contact her PCP - PCP is with the [facility] - also wanted to send to her cardiologist to get her BP down     PCP-  [phone]    Will have to call PCP 10/3      Please advise        ----- Message from Rolando sent at 10/2/2024  4:39 PM CDT -----  Contact: Yolanda  Type:  Patient Requesting a call back     Who Called:Yolanda  What is the call back request regarding?:requesting a call back from the nurse in regards to questions about her diagnoses  Would the patient rather a call back or a response via [facility]?call  Best Call Back Number:[phone]   Additional Information:Pt asked to please call back today if possible it is urgent

## 2024-10-03 ENCOUNTER — PATIENT MESSAGE (OUTPATIENT)
Dept: CARDIOLOGY | Facility: CLINIC | Age: 54
End: 2024-10-03

## 2024-10-03 ENCOUNTER — PATIENT MESSAGE (OUTPATIENT)
Dept: CARDIOLOGY | Facility: HOSPITAL | Age: 54
End: 2024-10-03
Payer: MEDICAID

## 2024-10-03 ENCOUNTER — OFFICE VISIT (OUTPATIENT)
Dept: CARDIOLOGY | Facility: CLINIC | Age: 54
End: 2024-10-03
Payer: MEDICAID

## 2024-10-03 DIAGNOSIS — E11.69 DYSLIPIDEMIA ASSOCIATED WITH TYPE 2 DIABETES MELLITUS: Chronic | ICD-10-CM

## 2024-10-03 DIAGNOSIS — E11.9 HYPERTENSION COMPLICATING DIABETES: Chronic | ICD-10-CM

## 2024-10-03 DIAGNOSIS — E78.2 MIXED HYPERLIPIDEMIA: ICD-10-CM

## 2024-10-03 DIAGNOSIS — R00.2 PALPITATIONS: ICD-10-CM

## 2024-10-03 DIAGNOSIS — E78.5 DYSLIPIDEMIA ASSOCIATED WITH TYPE 2 DIABETES MELLITUS: Chronic | ICD-10-CM

## 2024-10-03 DIAGNOSIS — E66.813 CLASS 3 SEVERE OBESITY WITH SERIOUS COMORBIDITY AND BODY MASS INDEX (BMI) OF 40.0 TO 44.9 IN ADULT, UNSPECIFIED OBESITY TYPE: ICD-10-CM

## 2024-10-03 DIAGNOSIS — E78.1 HYPERTRIGLYCERIDEMIA: Chronic | ICD-10-CM

## 2024-10-03 DIAGNOSIS — I10 HYPERTENSION COMPLICATING DIABETES: Chronic | ICD-10-CM

## 2024-10-03 DIAGNOSIS — I10 ESSENTIAL HYPERTENSION: Primary | Chronic | ICD-10-CM

## 2024-10-03 DIAGNOSIS — E66.01 CLASS 3 SEVERE OBESITY WITH SERIOUS COMORBIDITY AND BODY MASS INDEX (BMI) OF 40.0 TO 44.9 IN ADULT, UNSPECIFIED OBESITY TYPE: ICD-10-CM

## 2024-10-03 PROCEDURE — 4010F ACE/ARB THERAPY RXD/TAKEN: CPT | Mod: CPTII,95,,

## 2024-10-03 PROCEDURE — 3044F HG A1C LEVEL LT 7.0%: CPT | Mod: CPTII,95,,

## 2024-10-03 PROCEDURE — 99213 OFFICE O/P EST LOW 20 MIN: CPT | Mod: 95,,,

## 2024-10-03 NOTE — TELEPHONE ENCOUNTER
Contacted pt and got her scheduled for labs and heart monitor on 10/07/24. Pt michelle. Pt states she may go to ED as she is having a hard time breathing.

## 2024-10-03 NOTE — TELEPHONE ENCOUNTER
----- Message from Livia sent at 10/3/2024  9:59 AM CDT -----  Contact: self   .Type: Patient Call Back        Who called:   Patient      What is the request in detail:    Called in concerning diagnosis . Please call back   Can the clinic reply by [facility]?           Would the patient rather a call back or a response via [facility]?      call   Best call back number:  [phone]

## 2024-10-03 NOTE — PROGRESS NOTES
Yolanda Betts 54 y.o. female     History of Present Illness:  The patient location is: car  The chief complaint leading to consultation is: questions about previous diagnosis  Visit type: Virtual visit with synchronous audio and video  Total time spent with patient: 15  Each patient to whom he or she provides medical services by telemedicine is:  (1) informed of the relationship between the physician and patient and the respective role of any other health care provider with respect to management of the patient; and (2) notified that he or she may decline to receive medical services by telemedicine and may withdraw from such care at any time.    55y/o AA F whose current medical conditions include HTN on [facility], DM, obesity s/p gastric sleeve, migraines, asthma, Hlp, hypoventilation syndrome on O2 at night, nuc stress with TID 1.34 s/p [facility] end of September with Dr. Wills which revealed nml coronaries, nml EF. Here today via Virtual visit (has her phone camera covered, no visual of patient) with multiple complaints regarding her dx of hyperaldosteronism in 2019. She is having high BP readings at home (reports 200s/100s at highest) with multiple ED visits with no relief. Home readings per dig med program fluctuating. She states she is having HA, SOB and palpitations daily.  Compliant with her medications    Exam:  Review of Systems   Constitutional: Negative.    HENT: Negative.     Eyes: Negative.    Respiratory:  Positive for shortness of breath.    Cardiovascular:  Positive for palpitations.   Gastrointestinal: Negative.    Endocrine: Negative.    Genitourinary: Negative.    Musculoskeletal:  Positive for arthralgias and back pain.   Neurological:  Positive for dizziness.   Psychiatric/Behavioral: Negative.       Physical Exam  Vitals reviewed: limited virtual visit.   Neurological:      Mental Status: She is alert and oriented to person, place, and time.   Psychiatric:         Mood and Affect: Mood  is anxious.         Most Recent Laboratory Results Reviewed ({Yes)  Lab Results   Component Value Date    WBC 7.08 09/19/2024    HGB 10.3 (L) 09/19/2024    HCT 34.4 (L) 09/19/2024     09/19/2024    CHOL 209 (H) 04/29/2024    TRIG 117 04/29/2024    HDL 65 04/29/2024    ALT 17 09/19/2024    AST 16 09/19/2024     09/19/2024    K 4.7 09/19/2024     09/19/2024    CREATININE 0.7 09/19/2024    BUN 7 09/19/2024    CO2 25 09/19/2024    TSH 2.360 04/29/2024    INR 0.9 09/17/2024    HGBA1C 6.6 (H) 06/27/2024       Assessment     ICD-10-CM ICD-9-CM   1. Essential hypertension  I10 401.9   2. Hypertriglyceridemia  E78.1 272.1   3. Hypertension complicating diabetes  E11.9 250.00    I10 401.9   4. Dyslipidemia associated with type 2 diabetes mellitus  E11.69 250.80    E78.5 272.4   5. Mixed hyperlipidemia  E78.2 272.2   6. Class 3 severe obesity with serious comorbidity and body mass index (BMI) of 40.0 to 44.9 in adult, unspecified obesity type  E66.813 278.01    E66.01 V85.41    Z68.41         Plan   Essential hypertension    Hypertriglyceridemia    Hypertension complicating diabetes    Dyslipidemia associated with type 2 diabetes mellitus    Mixed hyperlipidemia    Class 3 severe obesity with serious comorbidity and body mass index (BMI) of 40.0 to 44.9 in adult, unspecified obesity type       Amlodipine/Valsartan, metoprolol, clonidine, profile BP, low Na diet- HTN, needs clinic visit to evaluate symptoms  RF modifications  Low na low fat diet  Daily exercise as tolerated  Weight loss    RTC 2 week for BP check  Labs and vital    No follow-ups on file.  Future Appointments       Date Provider Specialty Appt Notes    10/9/2024 Elizabeth Ceron NP Diabetes  Arrive at: Telehealth Dexcom and omnipod    10/14/2024 Jaimie Wills MD Cardiology hpfu/1-2wks    10/16/2024 Mariam Young, PhD, MPAP Psychiatry 6wks F/U    10/17/2024 Kathy Galan, NP Pain Medicine 3 mos f/u    10/17/2024  Lab Fatty Liver     10/17/2024 Chico Jackson MD Rheumatology rtc.DD    10/23/2024 Elizabeth Ceron NP Diabetes  Arrive at: Telehealth Dexcom and omnipod    11/6/2024 Elizabeth Ceron NP Diabetes  Arrive at: Telehealth Dexcom and omnipod    11/21/2024 Sarah Bai PA-C Pulmonology IDL

## 2024-10-03 NOTE — TELEPHONE ENCOUNTER
Followed up with Yolanda, patient had several questions regarding her medications and recent elevated BP reading. Offered the patient a same day virtual appointment with Karina Tejada. Patient accepted and verbalized understanding of time of appointment.

## 2024-10-06 ENCOUNTER — NURSE TRIAGE (OUTPATIENT)
Dept: ADMINISTRATIVE | Facility: CLINIC | Age: 54
End: 2024-10-06
Payer: MEDICAID

## 2024-10-06 NOTE — TELEPHONE ENCOUNTER
Pt states that she has a Dexcom and BS has been 399. States that Dexcom does not read anything higher. Pt reports receiving a total of 25 units of regular insulin today via insulin pump. Last dose of Novolog was 8 units at 2 pm. Dexcom reading 399 at this time. Pt unable to confirmed with finger stick. Denies rapid breathing. Call placed to Dr. Cate Nash per protocol- advised to go to  now. Pt made aware. VU. Encounter routed to provider.     Reason for Disposition   [1] Blood glucose > 300 mg/dL (16.7 mmol/L) AND [2] two or more times in a row    Additional Information   Negative: Unconscious or difficult to awaken   Negative: Acting confused (e.g., disoriented, slurred speech)   Negative: Very weak (e.g., can't stand)   Negative: Sounds like a life-threatening emergency to the triager   Negative: [1] Vomiting AND [2] signs of dehydration (e.g., very dry mouth, lightheaded, dark urine)   Negative: [1] Blood glucose > 240 mg/dL (13.3 mmol/L) AND [2] rapid breathing   Negative: Blood glucose > 500 mg/dL (27.8 mmol/L)   Negative: [1] Blood glucose > 240 mg/dL (13.3 mmol/L) AND [2] urine ketones moderate-large (or more than 1+)   Negative: [1] Blood glucose > 240 mg/dL (13.3 mmol/L) AND [2] blood ketones > 1.4 mmol/L   Negative: [1] Blood glucose > 240 mg/dL (13.3 mmol/L) AND [2] vomiting AND [3] unable to check for ketones (in blood or urine)   Negative: [1] New-onset diabetes suspected (e.g., frequent urination, weak, weight loss) AND [2] vomiting or rapid breathing   Negative: Vomiting lasts > 4 hours   Negative: Patient sounds very sick or weak to the triager   Negative: Fever > 100.4 F (38.0 C)   Negative: Blood glucose > 400 mg/dL (22.2 mmol/L)    Protocols used: Diabetes - High Blood Sugar-A-

## 2024-10-07 ENCOUNTER — HOSPITAL ENCOUNTER (OUTPATIENT)
Dept: CARDIOLOGY | Facility: HOSPITAL | Age: 54
Discharge: HOME | End: 2024-10-07
Payer: MEDICAID

## 2024-10-07 ENCOUNTER — TELEPHONE (OUTPATIENT)
Dept: DIABETES | Facility: CLINIC | Age: 54
End: 2024-10-07
Payer: MEDICAID

## 2024-10-07 ENCOUNTER — PATIENT OUTREACH (OUTPATIENT)
Dept: DIABETES | Facility: CLINIC | Age: 54
End: 2024-10-07
Payer: MEDICAID

## 2024-10-07 DIAGNOSIS — R00.2 PALPITATIONS: ICD-10-CM

## 2024-10-07 NOTE — TELEPHONE ENCOUNTER
Elizabeth Ceron, IRASEMA  P Osmany Johnson Staff  Caller: Unspecified (Yesterday,  4:05 PM)  1. Please send me her Dexcom and Omnipod report  2. Call her to see her current bg  3. Let her know to check her my chart messages so that I can communicate with her. Be sure her notification are turned on so that she sees messages I send to her.    Called patient no answer left a message  Reports pulled and sent to provider

## 2024-10-07 NOTE — TELEPHONE ENCOUNTER
Patient called voiced that she had elevated blood glucose over the weekend of >400. Patient reported administering bolus of 2.65 and Current reading during call was fasting 183 mg/dl . Dexcom and Omni pod reports uploaded         ----- Message from Rolando sent at 10/7/2024  9:06 AM CDT -----  Contact: Yolanda  Type:  Patient Returning Call    Who Called:Yolanda  Who Left Message for Patient:nurse  Does the patient know what this is regarding?:missed call  Would the patient rather a call back or a response via [facility]? call  Best Call Back Number:[phone]   Additional Information:

## 2024-10-07 NOTE — PROGRESS NOTES
Called pt to help her with changing her basal rate on her Omnipod 5 per request of Elizabeth Ceron NP.  Walked pt through changing basal rate from 0.4 units/hr to 0.45 units/hr.   Pt was able to take pictures of her controller screen and text to me so I verified settings are correct.     Discussed how ideally she should stay in automated mode. Pt stated that her BG was over 400 so she went into manual mode.   She stated that her BG always goes high even if she boluses.   Asked if she would like to see a DCS to discuss possible reasons for high BG and she said yes.   Scheduled with DIANA Perla for this Thursday, Oct 10.

## 2024-10-09 ENCOUNTER — OFFICE VISIT (OUTPATIENT)
Dept: DIABETES | Facility: CLINIC | Age: 54
End: 2024-10-09
Payer: MEDICAID

## 2024-10-09 ENCOUNTER — TELEPHONE (OUTPATIENT)
Dept: CARDIOLOGY | Facility: CLINIC | Age: 54
End: 2024-10-09
Payer: MEDICAID

## 2024-10-09 DIAGNOSIS — E11.9 HYPERTENSION COMPLICATING DIABETES: ICD-10-CM

## 2024-10-09 DIAGNOSIS — I10 HYPERTENSION COMPLICATING DIABETES: ICD-10-CM

## 2024-10-09 DIAGNOSIS — E11.69 TYPE 2 DIABETES MELLITUS WITH OTHER SPECIFIED COMPLICATION, UNSPECIFIED WHETHER LONG TERM INSULIN USE: Primary | ICD-10-CM

## 2024-10-09 PROCEDURE — 3044F HG A1C LEVEL LT 7.0%: CPT | Mod: CPTII,95,, | Performed by: NURSE PRACTITIONER

## 2024-10-09 PROCEDURE — 4010F ACE/ARB THERAPY RXD/TAKEN: CPT | Mod: CPTII,95,, | Performed by: NURSE PRACTITIONER

## 2024-10-09 PROCEDURE — 99213 OFFICE O/P EST LOW 20 MIN: CPT | Mod: 95,,, | Performed by: NURSE PRACTITIONER

## 2024-10-09 NOTE — TELEPHONE ENCOUNTER
----- Message from Nahomy Tejada NP sent at 10/9/2024  5:51 AM CDT -----  Electrolytes and kidney function look good.

## 2024-10-09 NOTE — PROGRESS NOTES
Chronic Pain Established Note (Follow up visit)      Chronic generalized pain.        SUBJECTIVE:  Interval History (10/17/2024):  Patient Yolanda Betts presents today for follow-up visit.  Patient is being seen for follow up of chronic neck and back pain. She has neck pain that radiates to the shoulders. She has had several cervical procedures with minimal relief. She is interested in addressing her back. Back pain radiates down the back of the left leg to the calf. She has pain across the lower back and buttock with tenderness in the hips. Pain is worse with prolonged activity. She rates her pain 8/10.   She has been seeing pulmonary due to some SOB issues ans asthma flare ups as well as working on her blood sugars which have been in the 400s. No recent A1C. She does not feel, due to the fatigue and SOB, that she is able to attend formal physical therapy.   Patient denies night fever/night sweats, urinary incontinence, bowel incontinence, significant weight loss and significant motor weakness.   Patient denies any other complaints or concerns at this time.      Interval History (7/11/2024):    Yolanda Betts is a 54 y.o. female presents today for follow-up chronic pain.  She continues to utilize Celebrex, Cymbalta, and lidocaine patches as prescribed.  She reports her pain is 8/10 and states that it is a generalized pain throughout her entire body.  She denies any new injuries or trauma in interim.  She also continues to utilize Cosentyx for her ankylosing spondylitis in his following up with rheumatology regularly.  She also has been taking Flexeril up to 2 times a day as needed for her myofascial pain with fair results.      Interval HPI 03/27/2024:  Patient Yolanda Betts presents today for follow-up visit.  Patient in for follow-up of cervical pain.  She has currently not having any pain radiating into the upper extremities but states at times her pain will radiate down her back.  She rates her  pain today an 8/10.  She has been attending physical therapy for 4-5 weeks and is not getting any relief.  She has been attending occupational therapy for hand strength and does report this is getting better.  She has had cervical ROCAEL as well as medial branch block with minimal or short duration of relief in the past.  She is tried multiple medications for her symptoms.  She followed up with Rheumatology this week who DC her tizanidine and started her on Flexeril as well as a Medrol Dosepak for when she has an acute flare-up.    Interval History (2/26/2024):  Patient Yolanda Betts presents today for follow-up visit.  Since last appt she has had:  1/30/2024 C5/6 IL ROCAEL relief x 1 week, now at baseline.   12/19/2023 Yusef C5-7 MBB no relief.  She still complains of cervical pain which radiates into the bilateral shoulders.  She rates her pain today an 8/10.  She is tried multiple medications in the past with either side effects or no relief including Topamax, gabapentin, Lyrica.  She is currently taking Cymbalta and tizanidine.  She has done physical therapy in the past which has worked really well for her and she is interested in resuming physical therapy program.  No new weakness into the arms or changes in neurological symptoms.    Interval history 12/1/2023  Yolanda Betts presents to tele-medicine appointment for a follow-up appointment for neck pain. Since the last visit, Yolanda Betts states the pain has been worsening. Current pain intensity is 10/10.  She locates the majority of pain to the cervical myofascial region with radiation into her shoulder blade area.  She denies any radiation into the upper extremities.  Previous cervical ROCAEL provided 50% relief for only about 3 weeks.    Interval History (1/17/2023):  Yolanda Betts presents today for follow-up visit.  Patient was last seen on 05/09/2022. At that visit, the plan was to discontinue Savella and Flexeril, continue Cymbalta 60 mg  once daily, and begin Tizanidine. She reports she had a slip and fall on Sunday in her tub. Denies any dizziness or LOC before fall, just slipped. Able to get up. Sore all over since then. Patient reports pain as 10/10 today. MRI of cervical spine ordered by Dr. Kirkpatrick, who thinks shoulder pain is stemming more from neck. No injections in shoulder. Pain begins in neck and radiates into both arms and down spine. Reports nothing helps her pain. Requesting referral for medicinal marijuana as she heard that this may be beneficial.        Interval HPI 05/09/2022  Yolanda Betts is a 52 y.o. female presents today for tele medicine follow-up.  She was recently seen on 03/07/2022.  At that visit, she was provided with a Medrol Dosepak and advised to continue with topical analgesics along with Savella, Cymbalta, and Flexeril p.r.n..  She reports less than 10% relief with this current medication regimen and is having poor sleep.     Interval History (3/7/2022):    Yolanda Betts presents today for follow-up visit.  Patient was last seen about 6 months ago.  She doesn't feel pain is controlled currently, but she does not feel like she can go up on the Savella because she is concerned about drowsiness.  She continues to take the Flexeril twice a day.  She alternates with Voltaren gel and lidocaine cream, which helps somewhat.  She continues to follow-up with orthopedics and had a right shoulder injection on 02/07/2022 with some pain relief.  Patient reports pain as 10/10 today.     Interval History (9/17/2021):  Yolanda Betts presents today on telemedicine visit.  Patient was last seen on 03/23/2021 with Dr. Hay. At that visit, she was on Cymbalta 60 mg twice a day, which she is now only taking once a day.  She is also taking Savella, and she inquires about an increase.  Patient reports pain as 8/10 today.     History of Present Illness:   This patient is a 50 y.o. female who presents today complaining of  the above noted pain/s. The patient describes the pain as follows.  Ms. Betts his new patient clinic with complaints of bilateral shoulders, bilateral hips, thoracic and lumbar spine pain.  She has a history of fibromyalgia reports that this pain feels different from the fibromyalgia.  She has been diagnosed with fibromyalgiafor approximately 10 years has been having these joint complaints for approximately last 5 years.  She describes a sharp throbbing sensation which is worse with movement and she has been unable find anything that provides pain relief.  She has tried numerous different medications including Aleve, Tylenol, Robaxin, tizanidine, Advil, gabapentin, Flexeril, Lyrica, Cymbalta, baclofen in addition to currently participating in physical therapy.  She has had shoulder joint injections in the past which helped for short period of time but denies having had hip injections.  She denies having had surgery on any of these joints.  She reports that anti-inflammatory medications cause her blood pressure to increase therefore she has to avoid them.     Yolanda Betts is a 52 y.o. female  who is presenting with a chief complaint of lumbar back pain . The patient began experiencing this problem insidiously, and the pain has been gradually worsening over the past 5 year(s). The pain is described as throbbing, cramping, aching and heavy and is located in the bilateral lumbar spine . Pain is intermittent and lasts hours. The  pain is nonradiating. The patient rates her pain a 7 out of ten and interferes with activities of daily living a 6 out of ten. Pain is exacerbated by flexion/ extension of the lumbar spine, and is improved by rest. Patient reports prior trauma, no prior spinal surgery      - pertinent negatives: No fever, No chills, No weight loss, No bladder dysfunction, No bowel dysfunction, No saddle anesthesia  - pertinent positives: none    - medications, other therapies tried (physical therapy,  injections):     >> NSAIDs, Tylenol, Tramadol, gabapentin, lyrica, zanaflex and flexeril    >> Has previously undergone Physical Therapy      And procedures:   -C6-7 IL ROCAEL on 01/31/2023, 50% relief for 3 weeks   1/30/2024 C5/6 IL ROCAEL relief x 1 week, now at baseline.   12/19/2023 Yusef C5-7 MBB no relief.     Imaging / Labs / Studies (reviewed on 10/17/2024):     MRI cervical spine 01/03/2023  FINDINGS:  The vertebral body heights and alignment are maintained throughout the cervical spine.     No abnormal vertebral body marrow signal.     Multilevel intervertebral disc desiccation.     No abnormal signal at the cervicomedullary junction or cervical spinal cord.     C2-3: Normal intervertebral disc contour. No central canal or neural foraminal narrowing.     C3-4: Normal intervertebral disc contour.  Bilateral uncovertebral and facet hypertrophy.  No central canal or neural foraminal narrowing.     C4-5: Intervertebral disc bulge.  Bilateral uncovertebral and facet hypertrophy.  No central canal narrowing.  Mild bilateral neural foraminal narrowing.     C5-6: Intervertebral disc bulge.  Bilateral uncovertebral and facet hypertrophy.  Minimal bilateral neural foraminal narrowing.  No central canal narrowing.     C6-7: Intervertebral disc bulge.  No central canal or neural foraminal narrowing.     C7-T1: Normal intervertebral disc contour. No central canal or neural foraminal narrowing.     The paravertebral soft tissues are unremarkable.     Impression:     At C4-5, there is mild intervertebral disc bulge with uncovertebral and facet hypertrophy mildly narrowing the bilateral neural foramina.     At C5-6, there is an intervertebral disc bulge with uncovertebral and facet hypertrophy minimally narrowing the bilateral neural foramina.      11/13/2020 X-Ray Lumbar Spine Ap And Lateral  COMPARISON:  Lumbar spine radiographs August 18, 2020     FINDINGS:  Alignment of the lumbar spine is normal without listhesis.  No  fracture.  No definite pars defect.  No suspicious osseous lesion.  Intervertebral disc spaces of the lumbar spine are maintained.  Inferior lumbar spine facet arthropathy is noted.  Sacroiliac joints are unremarkable.  Multiple phleboliths are present within the pelvis.        1/14/21 X-Ray Cervical Spine Complete 5 view  COMPARISON:  August 18, 2020     FINDINGS:  Visualization of C7-T1 level on the lateral view.  Vertebral height and alignment maintained with straightening of the normal curvature.  This can be seen with positioning as well as spasm and/or strain.  Prevertebral soft tissues, C1-2 articulation and odontoid normal in appearance allowing for positioning.  Pre dens space normal.  Neural foramina at widely patent at all levels bilaterally.     Remaining included osseous structures intact.  Visualized upper lung fields clear.     Impression  Straightening of normal C-spine curvature.  This can be seen with positioning as well as spasm and/or strain.     No acute fracture or subluxation.  Follow-up and or further evaluation as warranted.       PMHx,PSHx, Social history, and Family history:  I have reviewed the patient's medical, surgical, social, and family history in detail and updated the computerized patient record.    Review of patient's allergies indicates:   Allergen Reactions    Codeine Itching    Hydromorphone Other (See Comments)     Can't wake up for long time if taken    Slow to wake up after surgery after receiving    Aleve [naproxen sodium]      Increases BP    Ibuprofen      Increases BP    Neuromuscular blockers, steroidal Hives     some    Pregabalin Itching    Latex, natural rubber Rash    Morphine Rash     itching    Norco [hydrocodone-acetaminophen] Itching, Rash and Hallucinations    Secobarbital sodium Rash     itching    Tylox [oxycodone-acetaminophen] Rash       Current Outpatient Medications   Medication Sig    albuterol (PROVENTIL) 2.5 mg /3 mL (0.083 %) nebulizer solution Take  "2.5 mg by nebulization every 4 (four) hours as needed. Times a day    albuterol (PROVENTIL/VENTOLIN HFA) 90 mcg/actuation inhaler Inhale 2 puffs into the lungs every 6 (six) hours as needed for Wheezing.    ALPRAZolam (XANAX) 1 MG tablet Take 1 tablet (1 mg total) by mouth 2 (two) times daily as needed for Anxiety.    amlodipine-valsartan (EXFORGE)  mg per tablet Take 1 tablet by mouth once daily.    atorvastatin (LIPITOR) 20 MG tablet Take 1 tablet (20 mg total) by mouth every evening    azelastine (ASTELIN) 137 mcg (0.1 %) nasal spray 2 sprays (274 mcg total) by Nasal route 2 (two) times daily.    BD INSULIN SYRINGE ULTRA-FINE 1/2 mL 30 gauge x 1/2" Syrg     blood-glucose sensor (DEXCOM G6 SENSOR) Mariela change sensor every 10 days.    blood-glucose sensor (DEXCOM G7 SENSOR) Mariela Use to check bg. Change every 10 days    blood-glucose transmitter (DEXCOM G6 TRANSMITTER) Mariela 1 each by Misc.(Non-Drug; Combo Route) route every 3 (three) months.    blood-glucose transmitter (DEXCOM G6 TRANSMITTER) Mariela Use as directed change every 3 months    celecoxib (CELEBREX) 100 MG capsule Take 1 capsule (100 mg total) by mouth 2 (two) times daily.    cevimeline (EVOXAC) 30 mg capsule Take 1 capsule by mouth in the morning and 1 capsule at noon and 1 capsule before bedtime. Do all this for 30 days.    cloNIDine (CATAPRES) 0.1 MG tablet Take 1 tablet (0.1 mg total) by mouth 2 (two) times daily.    cyclobenzaprine (FLEXERIL) 10 MG tablet Take 1 tablet (10 mg total) by mouth 3 (three) times daily as needed (Pain).    DULoxetine (CYMBALTA) 60 MG capsule Take 1 capsule by mouth 2 times daily    ergocalciferol (VITAMIN D2) 50,000 unit Cap Take 1 capsule twice weekly for 3 weeks then weekly    estradioL (ESTRACE) 0.01 % (0.1 mg/gram) vaginal cream Place 1 g vaginally twice a week.    furosemide (LASIX) 20 MG tablet Take 1 tablet (20 mg total) by mouth once daily.    glucagon (BAQSIMI) 3 mg/actuation Spry 1 spray by Nasal route as " "needed (hypoglycemia). For emergency use. May repeat 1 time after 15 minutes    glucagon (BAQSIMI) 3 mg/actuation [place] SPRAY 1 SPRAY IN NOSTRIL AS NEEDED FOR EMERGENCY. MAY REPEAT 1 TIME AFTER 15 MINUTES    insulin aspart U-100 (NOVOLOG U-100 INSULIN ASPART) 100 unit/mL injection To use via insulin pump. Up to 200 units every 3 days    insulin aspart U-100 (NOVOLOG) 100 unit/mL injection NovoLOG FlexPen    insulin glargine U-100, Lantus, (LANTUS SOLOSTAR U-100 INSULIN) 100 unit/mL (3 mL) InPn pen Inject up to 50 units daily in event of pump failure    insulin pump cart,automated,BT (OMNIPOD 5 G6 PODS, GEN 5,) Crtg Inject into skin every 3 days as directed    insulin syringe-needle U-100 0.3 mL 30 gauge x 5/16" Syrg 1 each by Misc.(Non-Drug; Combo Route) route Every 3 (three) days.    lamoTRIgine (LAMICTAL) 200 MG tablet Take 1 tablet (200 mg total) by mouth 2 (two) times daily.    levocetirizine (XYZAL) 5 MG tablet Take 1 tablet (5 mg total) by mouth every evening.    LIDOcaine (LIDODERM) 5 % Place 2 patches onto the skin every 12 (twelve) hours as needed (pain). Remove & Discard patch within 12 hours or as directed by MD    lifitegrast (XIIDRA) 5 % Dpet Place 1 drop into both eyes 2 (two) times daily.    lumateperone (CAPLYTA) 21 mg Cap Take 1 capsule (21 mg total) by mouth once daily.    magic mouthwash diphen/antac/lidoc/nysta Swish and spit 5 mLs every 4 (four) hours as needed.    magnesium hydroxide 400 mg/5 ml (MILK OF MAGNESIA) 400 mg/5 mL Susp Take 5 mLs by mouth every 4 (four) hours as needed.    magnesium oxide (MAG-OX) 400 mg (241.3 mg magnesium) tablet Take 1 tablet (400 mg total) by mouth once daily.    methylPREDNISolone (MEDROL DOSEPACK) 4 mg tablet use as directed    metoprolol succinate (TOPROL-XL) 50 MG 24 hr tablet Take 1 tablet (50 mg total) by mouth every morning.    nebulizer and compressor (COMP-AIR NEBULIZER COMPRESSOR) Mariela use as directed    omeprazole (PRILOSEC) 40 MG capsule Take 1 " "capsule (40 mg total) by mouth once daily.    OZEMPIC 2 mg/dose (8 mg/3 mL) PnIj Inject 2 mg into the skin every 7 days.    pen needle, diabetic (BD ULTRA-FINE MINI PEN NEEDLE) 31 gauge x 3/16" Ndle Use 1 a day in event of pump failure    polyethylene glycol (GLYCOLAX) 17 gram/dose powder Take 17 g by mouth 2 (two) times daily.    promethazine (PHENERGAN) 25 MG tablet Take 25 mg by mouth every 6 (six) hours as needed.    secukinumab (COSENTYX PEN, 2 PENS,) 150 mg/mL PnIj Inject 300 mg into the skin every 14 (fourteen) days.    semaglutide (OZEMPIC) 2 mg/dose (8 mg/3 mL) PnIj Inject 2 mg into the skin every 7 days.    spironolactone (ALDACTONE) 25 MG tablet Take 1 tablet by mouth in the morning.    SYMBICORT 160-4.5 mcg/actuation HFAA Inhale 2 puffs into the lungs in the morning and 2 puffs before bedtime. 2 puffs every 12 hours    traMADoL (ULTRAM) 50 mg tablet Take 1 tablet (50 mg total) by mouth every 6 (six) hours as needed for Pain.    triamcinolone acetonide 0.1% (KENALOG) 0.1 % paste Apply coating 2 times daily    zolpidem (AMBIEN) 5 MG Tab Take 1 tablet (5 mg total) by mouth nightly as needed (anxiety).    blood-glucose meter,continuous (DEXCOM G7 ) Misc 1 each by Misc.(Non-Drug; Combo Route) route once. for 1 dose    diazePAM (VALIUM) 5 MG tablet Take 1 tablet (5 mg total) by mouth once. 45 minutes to 1 hour prior to MRI for procedural anxiety for 1 dose    fenofibrate (TRICOR) 145 MG tablet Take 1 tablet (145 mg total) by mouth once daily.     No current facility-administered medications for this visit.     Facility-Administered Medications Ordered in Other Visits   Medication    ondansetron injection 4 mg       REVIEW OF SYSTEMS:    GENERAL:  No weight loss, malaise or fevers.  HEENT:   No recent changes in vision or hearing  NECK:  Negative for lumps, no difficulty with swallowing.  RESPIRATORY:  Negative for cough, wheezing or shortness of breath, patient denies any recent URI.  CARDIOVASCULAR:  " "Negative for chest pain, leg swelling or palpitations.  GI:  Negative for abdominal discomfort, blood in stools or black stools or change in bowel habits.  MUSCULOSKELETAL:  See HPI.  SKIN:  Negative for lesions, rash, and itching.  PSYCH:  No mood disorder or recent psychosocial stressors.  Patients sleep is not disturbed secondary to pain.  HEMATOLOGY/LYMPHOLOGY:  Negative for prolonged bleeding, bruising easily or swollen nodes.  Patient is not currently taking any anti-coagulants  NEURO:   No history of headaches, syncope, paralysis, seizures or tremors.  All other reviewed and negative other than HPI.    OBJECTIVE:  /85   Pulse 87   Resp 17   Ht 4' 8" (1.422 m)   Wt 92.9 kg (204 lb 12.9 oz)   BMI 45.92 kg/m²      Physical Exam:   General: Alert and oriented, in no apparent distress.  Gait: normal gait.  Skin: No rashes, No discoloration, No obvious lesions  HEENT: Normocephalic, atraumatic. Pupils equal and round.  Cardiovascular: Regular rate and rhythm , no significant peripheral edema present  Respiratory: Without audible wheezing, without use of accessory muscles of respiration.     Musculoskeletal:     Cervical Spine     - Pain on flexion of cervical spine Present  - Spurling's Test:  equivocal bilaterally     - Pain on extension of cervical spine Present  - TTP over the cervical facet joints Present  - Cervical facet loading Present     -TTP over trapezius bilaterally with allodynia, grabbed practitioners hands during exam  -TTP over rhomboid bilaterally with allodynia        Lumbar Spine     - Pain on flexion of lumbar spine Absent  - Straight Leg Raise:  positive bilaterally     - Pain on extension of lumbar spine Present  - TTP over the lumbar facet joints Present  - Lumbar facet loading Present   SIJ testing:  - TTP over SI joint: Present bilaterally  - Clare's/ Hank's: Positive bilaterally  - Sacroiliac Distraction Test (anterior pressure): Positive  - Sacroiliac Compression Test " (lateral pressure): Positive   - SacralThrust Test (posterior pressure): Positive  Tender over GTB- positive bilaterally        Neuro:     Strength:  UE R/L: D: 5/5; B: 5/5; T: 5/5; WF: 5/5; WE: 5/5; IO: 5/5;  LE R/L: HF: 5/5, HE: 5/5, KF: 5/5; KE: 5/5; FE: 5/5; FF: 5/5     Extremity Reflexes: Brisk and symmetric throughout.      Extremity Sensory: Sensation to pinprick and temperature symmetric. Proprioception intact.      Psych:  Mood and affect is appropriate       LABS:  Lab Results   Component Value Date    WBC 8.10 10/12/2024    HGB 9.5 (L) 10/12/2024    HCT 30.7 (L) 10/12/2024    MCV 77 (L) 10/12/2024     10/12/2024       CMP  Sodium   Date Value Ref Range Status   10/12/2024 134 (L) 136 - 145 mmol/L Final     Potassium   Date Value Ref Range Status   10/12/2024 4.4 3.5 - 5.1 mmol/L Final     Chloride   Date Value Ref Range Status   10/12/2024 100 95 - 110 mmol/L Final     CO2   Date Value Ref Range Status   10/12/2024 25 23 - 29 mmol/L Final     Glucose   Date Value Ref Range Status   10/12/2024 289 (H) 70 - 110 mg/dL Final     BUN   Date Value Ref Range Status   10/12/2024 10 6 - 20 mg/dL Final     Creatinine   Date Value Ref Range Status   10/12/2024 0.8 0.5 - 1.4 mg/dL Final     Calcium   Date Value Ref Range Status   10/12/2024 9.7 8.7 - 10.5 mg/dL Final     Total Protein   Date Value Ref Range Status   10/12/2024 6.9 6.0 - 8.4 g/dL Final     Albumin   Date Value Ref Range Status   10/12/2024 3.6 3.5 - 5.2 g/dL Final     Total Bilirubin   Date Value Ref Range Status   10/12/2024 0.2 0.1 - 1.0 mg/dL Final     Comment:     For infants and newborns, interpretation of results should be based  on gestational age, weight and in agreement with clinical  observations.    Premature Infant recommended reference ranges:  Up to 24 hours.............<8.0 mg/dL  Up to 48 hours............<12.0 mg/dL  3-5 days..................<15.0 mg/dL  6-29 days.................<15.0 mg/dL       Alkaline Phosphatase   Date  Value Ref Range Status   10/12/2024 113 55 - 135 U/L Final     AST   Date Value Ref Range Status   10/12/2024 14 10 - 40 U/L Final     ALT   Date Value Ref Range Status   10/12/2024 21 10 - 44 U/L Final     Anion Gap   Date Value Ref Range Status   10/12/2024 9 8 - 16 mmol/L Final     eGFR if    Date Value Ref Range Status   03/08/2022 >60.0 >60 mL/min/1.73 m^2 Final     eGFR if non    Date Value Ref Range Status   03/08/2022 >60.0 >60 mL/min/1.73 m^2 Final     Comment:     Calculation used to obtain the estimated glomerular filtration  rate (eGFR) is the CKD-EPI equation.          Lab Results   Component Value Date    HGBA1C 6.6 (H) 06/27/2024             ASSESSMENT: 54 y.o. year old female with neck pain, consistent with     1. Dorsalgia, unspecified        2. Lumbar radiculopathy  MRI Lumbar Spine Without Contrast      3. Cervical spondylosis        4. Cervical radiculopathy        5. Chronic myofascial pain        6. Ankylosing spondylitis, unspecified site of spine                      PLAN:   - Interventional:  None at this time, consider lumbar ROCAEL vs bilateral SIJ + bilateral GTB injection. Will get lumbar MRI    Medications:  -Continue  Cymbalta 60 mg b.i.d. per rheumatology  -continue Cosentyx per rheumatology   -we will refill for Lidoderm patches to apply to painful areas as needed Q 12 p.r.n.  -rheumatology has her on flexeril  - Patient has failed Gabapentin, Lyrica, topamax.    -Will continue celebrex 100mg BID. Do not combine with other NSAIDs such as ibuprofen, aleve, advil. Take with food. If any GI upset, stop medication and contact office.   We have previously discussed potential deleterious side effects of NSAIDs on the cardiovascular, gastrointestinal and renal systems. We have discussed judicious use of this medication.      -Referral previously sent to Dr. Pelon Drew for Medicinal Marijuana     - Rehabilitative: Encouraged regular exercise. Continue  exercises and activities as tolerated.      - Diagnostic: Cervical CT and MRI reviewed  Will get lumbar MRI to evaluate lumbar radiculopathy- pt currently states she is unable to do PT due to pulmonary issues with SOB. She has been doing at home physician guided exercises for the past 8 weeks.     - Follow up:  after MRI     - This condition does not require this patient to take time off of work, and the primary goal of our Pain Management services is to improve the patient's functional capacity.      - I discussed the risks, benefits, and alternatives to potential treatment options. All questions and concerns were fully addressed today in clinic.     The above plan and management options were discussed at length with patient. Patient is in agreement with the above and verbalized understanding.    Visit today included increased complexity associated with the care of the episodic problem of chronic pain which was addressed and continue to manage the longitudinal care of the patient due to the serious and/or complex managed problem(s) listed above.      Kathy Galan NP  Interventional Pain Management  [facility]    Disclaimer:  This note was prepared using voice recognition system and is likely to have sound alike errors that may have been overlooked even after proof reading.  Please call me with any questions

## 2024-10-09 NOTE — PROGRESS NOTES
Subjective:         Patient ID: Yolanda Betts is a 54 y.o. female.  Patient's current PCP is Robe Britton MD.       Chief Complaint: No chief complaint on file.      HPI  Yolanda Betts is a 54 y.o. Black or  female presenting for a follow up for diabetes. Patient has been diagnosed with diabetes for > 10 years.  In the last few months- pt BG have been very uncontrolled- several pump setting have been changed. Currently on omnipod 5- pump often switches to manual mode    Complications related to diabetes:  HTN, Hyperlipidemia, peripheral neuropathy; denies Pancreatitis; denies Gastroparesis; denies DKA; denies Hx/family Hx of MEN2/MTC; reports Frequent UTIs/yeast infections; Bariatric surgery 6/1/21. IBS- diarrhea/constipation    Past failed treatment include:  Jardiance- multiple yeast infections; Victoza - GI upset. GLP-1- not advised due to GI issues (per GI and DM management), Metformin- low BGs    Blood glucose testing is performed regularly with her Dexcom. Bg have been high due to stress.     Compliance:The patient reports medication and diet noncompliance (may eat too little or too much due to stress)      The patient location is: home, [place]  The chief complaint leading to consultation is: DM follow up    Visit type: audiovisual    Face to Face time with patient: 20 minutes of total time spent on the encounter, which includes face to face time and non-face to face time preparing to see the patient (eg, review of tests), Obtaining and/or reviewing separately obtained history, Documenting clinical information in the electronic or other health record, Independently interpreting results (not separately reported) and communicating results to the patient/family/caregiver, or Care coordination (not separately reported). Each patient to whom he or she provides medical services by telemedicine is:  (1) informed of the relationship between the physician and patient and the respective  role of any other health care provider with respect to management of the patient; and (2) notified that he or she may decline to receive medical services by telemedicine and may withdraw from such care at any time.         //   , There is no height or weight on file to calculate BMI.  Her blood sugar in clinic today is:   Lab Results   Component Value Date    POCGLU 78 03/10/2023       Labs reviewed and are noted below.  Her most recent A1C is:  Lab Results   Component Value Date    HGBA1C 6.6 (H) 06/27/2024    HGBA1C 6.5 (H) 11/29/2023    HGBA1C 5.6 08/08/2023     Lab Results   Component Value Date    CPEPTIDE 4.56 02/26/2018     Lab Results   Component Value Date    GLUTAMICACID 0.00 02/26/2018     Glucose   Date Value Ref Range Status   10/07/2024 183 (H) 70 - 110 mg/dL Final     Anion Gap   Date Value Ref Range Status   10/07/2024 4 (L) 8 - 16 mmol/L Final     eGFR if    Date Value Ref Range Status   03/08/2022 >60.0 >60 mL/min/1.73 m^2 Final     eGFR if non    Date Value Ref Range Status   03/08/2022 >60.0 >60 mL/min/1.73 m^2 Final     Comment:     Calculation used to obtain the estimated glomerular filtration  rate (eGFR) is the CKD-EPI equation.          CURRENT DM MEDICATIONS:   Diabetes Medications               glucagon (BAQSIMI) 3 mg/actuation Spry 1 spray by Nasal route as needed (hypoglycemia). For emergency use. May repeat 1 time after 15 minutes    insulin aspart U-100 (NOVOLOG FLEXPEN U-100 INSULIN) 100 unit/mL (3 mL) InPn pen Inject 24 Units into the skin 3 (three) times daily before meals. Use per sliding scale for breakfast and dinner    insulin aspart U-100 (NOVOLOG U-100 INSULIN ASPART) 100 unit/mL injection Inject 100 Units into the skin Every 3 (three) days. To use via insulin pump                   Diabetes Management Status    Statin: Taking  ACE/ARB: Taking    Screening or Prevention Patient's value Goal Complete/Controlled?   HgA1C Testing and Control   Lab  Results   Component Value Date    HGBA1C 6.6 (H) 06/27/2024      Annually/Less than 8% Yes   Lipid profile : 04/29/2024 Annually Yes   LDL control Lab Results   Component Value Date    LDLCALC 123 (H) 04/29/2024    Annually/Less than 100 mg/dl  Yes   Nephropathy screening Lab Results   Component Value Date    LABMICR 22.0 12/11/2023     Lab Results   Component Value Date    PROTEINUA Trace (A) 01/23/2024    Annually Yes   Blood pressure BP Readings from Last 1 Encounters:   09/27/24 117/68    Less than 140/90 Yes   Dilated retinal exam : 03/08/2024 Annually Yes   Foot exam   : 02/16/2023 Annually Yes     Review of Systems   Constitutional:  Positive for appetite change (decreased). Negative for activity change.   Gastrointestinal:  Positive for constipation and diarrhea. Negative for abdominal pain, nausea and vomiting.        Hx of IBS, GERD   Endocrine: Positive for polydipsia. Negative for polyphagia and polyuria.   Musculoskeletal:  Positive for arthralgias.   Neurological:  Negative for syncope and weakness.        Neuropathy   Psychiatric/Behavioral:  Negative for confusion.         Depression         Objective:      Physical Exam  Constitutional:       General: She is not in acute distress.     Appearance: She is not ill-appearing.   Neck:      Thyroid: Mass: neg of self exam.   Neurological:      Mental Status: She is alert.   Psychiatric:         Speech: Speech normal.       Assessment:       No diagnosis found.                    Plan:   There are no diagnoses linked to this encounter.        OP5  Novolog  Basal 0.46 Units/hr  Insulin : Carb Ratios 24 g/Unit  Sensitivity (ISF, Correction) 42 mg/dL  BG Target Range 110 mg/dL (+0/-0)  BG Correction Threshold 110 mg/dL         PLAN:   - Condition: uncontrolled    - Monitor blood glucose 4x daily. Goals reviewed  - She is working with the RD  - BP and LDL- Recommended lifestyle modifications for management. Encouraged healthy low fat, low carb diet and  increase physical activity  - Pump Changes: will consult with RD, may need to revise all pump settings, needs to correct BG more often  - Continue to see the psychiatrist for depression (causing reduced appetite); discussed coping techniques  - The patient was explained the above plan and given opportunity to ask questions.  She understands, chooses and consents to this plan and accepts all the risks, which include but are not limited to the risks mentioned above.   - Labs ordered as above  - Nurse visit:  deferred    - Follow up: 2 weeks and 4 weeks         For pump failure:    Use Novolog Insulin Carb ratio 1:20     Lantus 10 units once daily    Correction scale (for steroid use) :  Novolog every 3 hours using correction scale outside of mealtime.  -200: 2 units  -250: 4 units  -300: 6 units  -350: 8 units  BG greater than 350: 10 units      A total of 20 minutes was spent in face to face time, of which over 50% was spent in counseling patient on disease process, complications, treatment, and side effects of medications.

## 2024-10-10 ENCOUNTER — CLINICAL SUPPORT (OUTPATIENT)
Dept: DIABETES | Facility: CLINIC | Age: 54
End: 2024-10-10
Payer: MEDICAID

## 2024-10-10 ENCOUNTER — TELEPHONE (OUTPATIENT)
Dept: DIABETES | Facility: CLINIC | Age: 54
End: 2024-10-10
Payer: MEDICAID

## 2024-10-10 ENCOUNTER — TELEPHONE (OUTPATIENT)
Dept: CARDIOLOGY | Facility: CLINIC | Age: 54
End: 2024-10-10
Payer: MEDICAID

## 2024-10-10 VITALS — BODY MASS INDEX: 45.82 KG/M2 | WEIGHT: 204.38 LBS

## 2024-10-10 DIAGNOSIS — I10 ESSENTIAL HYPERTENSION: Chronic | ICD-10-CM

## 2024-10-10 DIAGNOSIS — E11.65 TYPE 2 DIABETES MELLITUS WITH HYPERGLYCEMIA, WITH LONG-TERM CURRENT USE OF INSULIN: Primary | Chronic | ICD-10-CM

## 2024-10-10 DIAGNOSIS — Z79.4 TYPE 2 DIABETES MELLITUS WITH HYPERGLYCEMIA, WITH LONG-TERM CURRENT USE OF INSULIN: Primary | Chronic | ICD-10-CM

## 2024-10-10 DIAGNOSIS — E87.5 HYPERKALEMIA: ICD-10-CM

## 2024-10-10 DIAGNOSIS — E26.9 HYPERALDOSTERONISM: ICD-10-CM

## 2024-10-10 DIAGNOSIS — R09.81 NASAL CONGESTION: ICD-10-CM

## 2024-10-10 PROCEDURE — 99999 PR PBB SHADOW E&M-EST. PATIENT-LVL V: CPT | Mod: PBBFAC,,, | Performed by: DIETITIAN, REGISTERED

## 2024-10-10 PROCEDURE — G0108 DIAB MANAGE TRN  PER INDIV: HCPCS | Mod: PBBFAC | Performed by: DIETITIAN, REGISTERED

## 2024-10-10 PROCEDURE — 99215 OFFICE O/P EST HI 40 MIN: CPT | Mod: PBBFAC | Performed by: DIETITIAN, REGISTERED

## 2024-10-10 PROCEDURE — 99999PBSHW PR PBB SHADOW TECHNICAL ONLY FILED TO HB: Mod: PBBFAC,,,

## 2024-10-10 RX ORDER — FUROSEMIDE 20 MG/1
20 TABLET ORAL DAILY
Qty: 90 TABLET | Refills: 3 | OUTPATIENT
Start: 2024-10-10 | End: 2025-10-10

## 2024-10-10 RX ORDER — AZELASTINE 1 MG/ML
2 SPRAY, METERED NASAL 2 TIMES DAILY
Qty: 30 ML | Refills: 0 | OUTPATIENT
Start: 2024-10-10 | End: 2025-10-10

## 2024-10-10 NOTE — PROGRESS NOTES
Diabetes Care Specialist Progress Note  Author: Yara Sanchez RD, CDE  Date: 10/10/2024    Intake    Program Intake  Reason for Diabetes Program Visit:: Intervention (DM referral 2/5/24 Elizabeth Ceron NP)  Type of Intervention:: Individual  Education: Advanced Pump (OP5 troublesoot high BG patterns and why pump switching into manual mode)  Current diabetes risk level:: high  In the last 12 months, have you:: used emergency room services  Was the ER or hospital admission related to diabetes?: Yes (hyperglycemia)    Current Diabetes Treatment: Novolog via OP5 and Dex G6 (training 2/26/24)    Continuous Glucose Monitoring  Patient has CGM: Yes  Personal CGM type:: Dexcom G6 - pt denies device or placement issues.  GMI Date: 10/10/24  GMI Value: 8.7 %    Lab Results   Component Value Date    HGBA1C 6.6 (H) 06/27/2024       Weight: 92.7 kg (204 lb 5.9 oz)       Body mass index is 45.82 kg/m².    Lifestyle Coping Support & Clinical    Lifestyle/Coping/Support  Does anyone in your family have diabetes or does anyone in your family support you in your diabetes care?: Pt reports daughter assisting with cleaning and meal prep.  List anything about Diabetes that causes you stress?: Pt reports food insecurity. She doesn't have vehicle and uses medical transportation for clinic visits.  How do you deal with stress/distress?: Pt is seeing Dr. Young for psych care.  Culture or Uatsdin beliefs that may impact ability to access healthcare: No  Psychosocial/Coping Skills Assessment Completed: : Yes  Assessment indicates:: Instruction Needed  Area of need?: Yes    Problem Review  Active Comorbidities:  (HTN, HLD, Neuropathy, VitD defn, Bariatric surgery 6/1/21, IBS- diarrhea/constipation, GERD, DAVIS, Iron defn anemia, Asthma, Mental health ds, BMI 45.82.)    Diabetes Self-Management Skills Assessment    Medication Skills Assessment  Patient is able to identify current diabetes medications, dosages, and appropriate timing of  "medications.: no (Pt with missed correction bolus and using 'fake" carb bolus 2x/d in amt ~ grams carb to lower BG.)  Patient reports problems or concerns with current medication regimen.: yes  Medication regimen problems/concerns:: does not feel like regimen is working (Pt concerned about high BG patterns and pump switching back from automated to manual mode.)  Patient is  aware that some diabetes medications can cause low blood sugar?: Yes  Medication Skills Assessment Completed:: Yes  Assessment indicates:: Instruction Needed, Knowledge deficit  Area of need?: Yes    Nutrition/Healthy Eating  Meal Plan 24 Hour Recall - Breakfast: tea w/ honey  Meal Plan 24 Hour Recall - Lunch: none  Meal Plan 24 Hour Recall - Dinner: chix deepa w/ 2 bread, water  Meal Plan 24 Hour Recall - Beverage: marco: mostly water  Who shops/cooks?: mostly self, dtr assists  Patient can identify foods that impact blood sugar.: yes  Challenges to healthy eating::  (Pt reports decreased appetite related to social stressors.)  Nutrition/Healthy Eating Skills Assessment Completed:: Yes  Assessment indicates:: Instruction Needed  Area of need?: Yes    Physical Activity/Exercise  Patient's daily activity level::  (None recent due decreased exercise tolerance. Pt reports difficulty breathing during activity and is seeing cardio for workup.)  Physical Activity/Exercise Skills Assessment Completed: : Yes  Assessment indicates:: Adequate understanding  Area of need?: Yes    Home Blood Glucose Monitoring  Patient states that blood sugar is checked at home daily.: yes  Personal CGM type:: Dexcom G6 - pt denies device or placement issues.  Home Blood Glucose Monitoring Skills Assessment Completed: : Yes  Assessment indicates:: Adequate understanding  Area of need?: No    Acute Complications  Have you ever had hypoglycemia (low BG 70 or less)?: no (Pt denies recent events.)  Have you ever had hyperglycemia (high  or more)?: yes (Noted recent " hosp visit. Pt reports polydipsia, fatigue.)  Acute Complications Skills Assessment Completed: : Yes  Assessment indicates:: Instruction Needed  Area of need?: Yes    Assessment Summary and Plan  Based on today's diabetes care assessment, the following areas of need were identified:          10/10/2024   Areas of Need   Medications/Current Diabetes Treatment Yes - see care plan   Lifestyle Coping Support Yes   - encouraged upcoming follow-up appts w/ cardiology and Dr. Young   -entered referral to outpt case mgmt   -discussed activities to assist like puzzles, coloring, reading    Nutrition/Healthy Eating Yes - see care plan   Physical Activity/Exercise Yes - deferred   Home Blood Glucose Monitoring No   Acute Complications Yes  Discussed prevention, identification signs/symptoms and treatment of hyperglycemia and hypoglycemia and when to contact clinic.       Today's interventions were provided through individual discussion, instruction, and written materials were provided.    Patient verbalized understanding of instruction and written materials.  Pt was able to return back demonstration of instructions today. Patient understood key points, needs reinforcement and further instruction.     Diabetes Self-Management Care Plan:  Today's Diabetes Self-Management Care Plan was developed with Yolanda's input. Yolanda has agreed to work toward the following goal(s) to improve his/her overall diabetes control.      Care Plan: Diabetes Management   Updates made since 9/10/2024 12:00 AM        Problem: Medications         Goal: Patient Agrees to take Diabetes Medication(s) as prescribed.    Start Date: 2/6/2024   Expected End Date: 2/5/2025   This Visit's Progress: Not met   Recent Progress: On track   Priority: High   Barriers: Other (comments)   Note:    Reassurance and instruction provided on entering carb boluses accurately. Discussed how to enter correction bolus outside of meals/snacks and to use correction bolus every 3 hours.  Pt able to practice with examples and verbalized understanding.    Reviewed OP5 automated mode notifications with pt. Discussed that auto mode will switch during prolonged periods at max insulin delivery due to hyperglycemia and that her correction boluses will support consistency of staying in auto mode.     Noted recent basal and CF adjustments. Message to provide B.Osmany for eval IC ratio as meals appear to further increase pp excursions.      Problem: Healthy Eating         Goal: Eat 5 small meals daily to assist carb management and adequate nutrition intake.    Start Date: 2/6/2024   Expected End Date: 2/5/2025   This Visit's Progress: Not met   Priority: Medium   Barriers: Other (comments); Financial   Note:    Emphasis on role of meal spacing, carb/pro balance for adequate nutrition, BG stability.     Discussed specific tips for meals using her current foods available.     Encouraged continued family support and discussed additional community resources - [facility] [facility] and food back w/ contact info.        Follow Up Plan   Follow up in about 1 week (around 10/17/2024).  -review [place] reports  -eval goals    Today's care plan and follow up schedule was discussed with patient.  Yolanda verbalized understanding of the care plan, goals, and agrees to follow up plan.        The patient was encouraged to communicate with his/her health care provider/physician and care team regarding his/her condition(s) and treatment.  I provided the patient with my contact information today and encouraged to contact me via phone or [facility] Portal as needed.     Length of Visit   Total Time: 60 Minutes

## 2024-10-10 NOTE — TELEPHONE ENCOUNTER
----- Message from Med Assistant Hong sent at 10/10/2024  2:49 PM CDT -----  Contact: [phone]    ----- Message -----  From: Marcella Nickerson  Sent: 10/10/2024   2:36 PM CDT  To: [facility] Staff    [email or web address]     Patient is calling for Medical Advice regarding:she has not eaten she has no appetite     How long has patient had these symptoms:blood sugar at 345     Pharmacy name and phone#:    Patient wants a call back or [facility]:call back     Comments:  Pt is asking for a return call she would like to speak to you in regards to this   Please advise patient replies from provider may take up to 48 hours.

## 2024-10-10 NOTE — TELEPHONE ENCOUNTER
Spoke with pt. She reports discussing pump rate change with provider Abner and has adjusted IC 15.    Encouraged her to increase water intake, monitor BG levels and administer correction bolus every 3 hours. Reviewed when to seek ER care. Encouraged follow-up visit next week. Pt verbalized understanding. Successful outreach.

## 2024-10-10 NOTE — TELEPHONE ENCOUNTER
Refill Decision Note   Yolanda Betts  is requesting a refill authorization.  Brief Assessment and Rationale for Refill:  Quick Discontinue     Medication Therapy Plan:  Previously denied for office visit. Notified patient to schedule an appointment.      Comments:     Note composed:3:52 PM 10/10/2024

## 2024-10-10 NOTE — TELEPHONE ENCOUNTER
Pt contacted, last bolus was at 1:50 (1.1 units), advised pt to bolus again (she will received 5.5 units). Reminded to bolus/correct BG every 3 hours. IC ratio lowered to 15.

## 2024-10-10 NOTE — TELEPHONE ENCOUNTER
----- Message from Marcella sent at 10/10/2024  2:37 PM CDT -----  Contact: [phone] [email or web address]     Patient is calling for Medical Advice regarding:message for Laura Chase     How long has patient had these symptoms:    Pharmacy name and phone#:    Patient wants a call back or [facility]:call back     Comments:  Pt states she has not eaten and she has  o appetite and her blood sugar is at 345 please advise   Please advise patient replies from provider may take up to 48 hours.

## 2024-10-11 ENCOUNTER — HOSPITAL ENCOUNTER (EMERGENCY)
Facility: HOSPITAL | Age: 54
Discharge: HOME | End: 2024-10-11
Attending: EMERGENCY MEDICINE
Payer: MEDICAID

## 2024-10-11 VITALS
RESPIRATION RATE: 19 BRPM | DIASTOLIC BLOOD PRESSURE: 86 MMHG | SYSTOLIC BLOOD PRESSURE: 155 MMHG | HEART RATE: 100 BPM | HEIGHT: 56 IN | TEMPERATURE: 100 F | BODY MASS INDEX: 45.89 KG/M2 | OXYGEN SATURATION: 98 % | WEIGHT: 204 LBS

## 2024-10-11 DIAGNOSIS — R73.9 HYPERGLYCEMIA: Primary | ICD-10-CM

## 2024-10-11 LAB
ALBUMIN SERPL BCP-MCNC: 3.8 G/DL (ref 3.5–5.2)
ALLENS TEST: ABNORMAL
ALP SERPL-CCNC: 115 U/L (ref 55–135)
ALT SERPL W/O P-5'-P-CCNC: 20 U/L (ref 10–44)
ANION GAP SERPL CALC-SCNC: 11 MMOL/L (ref 8–16)
AST SERPL-CCNC: 15 U/L (ref 10–40)
B-OH-BUTYR BLD STRIP-SCNC: 0.1 MMOL/L (ref 0–0.5)
BACTERIA #/AREA URNS HPF: ABNORMAL /HPF
BASOPHILS # BLD AUTO: 0 K/UL (ref 0–0.2)
BASOPHILS NFR BLD: 0 % (ref 0–1.9)
BILIRUB SERPL-MCNC: 0.2 MG/DL (ref 0.1–1)
BILIRUB UR QL STRIP: NEGATIVE
BNP SERPL-MCNC: <10 PG/ML (ref 0–99)
BUN SERPL-MCNC: 9 MG/DL (ref 6–20)
CALCIUM SERPL-MCNC: 10.4 MG/DL (ref 8.7–10.5)
CHLORIDE SERPL-SCNC: 100 MMOL/L (ref 95–110)
CLARITY UR: CLEAR
CO2 SERPL-SCNC: 26 MMOL/L (ref 23–29)
COLOR UR: YELLOW
CREAT SERPL-MCNC: 1 MG/DL (ref 0.5–1.4)
DIFFERENTIAL METHOD BLD: ABNORMAL
EOSINOPHIL # BLD AUTO: 0 K/UL (ref 0–0.5)
EOSINOPHIL NFR BLD: 0 % (ref 0–8)
ERYTHROCYTE [DISTWIDTH] IN BLOOD BY AUTOMATED COUNT: 14.6 % (ref 11.5–14.5)
EST. GFR  (NO RACE VARIABLE): >60 ML/MIN/1.73 M^2
GLUCOSE SERPL-MCNC: 380 MG/DL (ref 70–110)
GLUCOSE UR QL STRIP: ABNORMAL
HCO3 UR-SCNC: 31.1 MMOL/L (ref 24–28)
HCT VFR BLD AUTO: 34.3 % (ref 37–48.5)
HGB BLD-MCNC: 10.4 G/DL (ref 12–16)
HGB UR QL STRIP: NEGATIVE
IMM GRANULOCYTES # BLD AUTO: 0.03 K/UL (ref 0–0.04)
IMM GRANULOCYTES NFR BLD AUTO: 0.4 % (ref 0–0.5)
KETONES UR QL STRIP: NEGATIVE
LEUKOCYTE ESTERASE UR QL STRIP: NEGATIVE
LYMPHOCYTES # BLD AUTO: 2.8 K/UL (ref 1–4.8)
LYMPHOCYTES NFR BLD: 40.3 % (ref 18–48)
MCH RBC QN AUTO: 23.8 PG (ref 27–31)
MCHC RBC AUTO-ENTMCNC: 30.3 G/DL (ref 32–36)
MCV RBC AUTO: 79 FL (ref 82–98)
MICROSCOPIC COMMENT: ABNORMAL
MONOCYTES # BLD AUTO: 0.5 K/UL (ref 0.3–1)
MONOCYTES NFR BLD: 6.8 % (ref 4–15)
NEUTROPHILS # BLD AUTO: 3.6 K/UL (ref 1.8–7.7)
NEUTROPHILS NFR BLD: 52.5 % (ref 38–73)
NITRITE UR QL STRIP: NEGATIVE
NRBC BLD-RTO: 0 /100 WBC
PCO2 BLDA: 52.2 MMHG (ref 35–45)
PH SMN: 7.38 [PH] (ref 7.35–7.45)
PH UR STRIP: 6 [PH] (ref 5–8)
PLATELET # BLD AUTO: 381 K/UL (ref 150–450)
PMV BLD AUTO: 9.4 FL (ref 9.2–12.9)
PO2 BLDA: 44 MMHG (ref 40–60)
POC BE: 6 MMOL/L
POC SATURATED O2: 77 % (ref 95–100)
POCT GLUCOSE: 295 MG/DL (ref 70–110)
POCT GLUCOSE: 436 MG/DL (ref 70–110)
POTASSIUM SERPL-SCNC: 5.2 MMOL/L (ref 3.5–5.1)
PROT SERPL-MCNC: 7.4 G/DL (ref 6–8.4)
PROT UR QL STRIP: NEGATIVE
RBC # BLD AUTO: 4.37 M/UL (ref 4–5.4)
SAMPLE: ABNORMAL
SITE: ABNORMAL
SODIUM SERPL-SCNC: 137 MMOL/L (ref 136–145)
SP GR UR STRIP: 1.02 (ref 1–1.03)
TROPONIN I SERPL DL<=0.01 NG/ML-MCNC: <0.006 NG/ML (ref 0–0.03)
URN SPEC COLLECT METH UR: ABNORMAL
UROBILINOGEN UR STRIP-ACNC: NEGATIVE EU/DL
WBC # BLD AUTO: 6.89 K/UL (ref 3.9–12.7)
YEAST URNS QL MICRO: ABNORMAL

## 2024-10-11 PROCEDURE — 93010 ELECTROCARDIOGRAM REPORT: CPT | Mod: ,,, | Performed by: STUDENT IN AN ORGANIZED HEALTH CARE EDUCATION/TRAINING PROGRAM

## 2024-10-11 PROCEDURE — 99285 EMERGENCY DEPT VISIT HI MDM: CPT | Mod: 25

## 2024-10-11 PROCEDURE — 83880 ASSAY OF NATRIURETIC PEPTIDE: CPT | Performed by: REGISTERED NURSE

## 2024-10-11 PROCEDURE — 82010 KETONE BODYS QUAN: CPT | Performed by: REGISTERED NURSE

## 2024-10-11 PROCEDURE — 81000 URINALYSIS NONAUTO W/SCOPE: CPT | Performed by: REGISTERED NURSE

## 2024-10-11 PROCEDURE — 82962 GLUCOSE BLOOD TEST: CPT

## 2024-10-11 PROCEDURE — 85025 COMPLETE CBC W/AUTO DIFF WBC: CPT | Performed by: REGISTERED NURSE

## 2024-10-11 PROCEDURE — 96360 HYDRATION IV INFUSION INIT: CPT

## 2024-10-11 PROCEDURE — 93005 ELECTROCARDIOGRAM TRACING: CPT

## 2024-10-11 PROCEDURE — 84484 ASSAY OF TROPONIN QUANT: CPT | Performed by: REGISTERED NURSE

## 2024-10-11 PROCEDURE — 80053 COMPREHEN METABOLIC PANEL: CPT | Performed by: REGISTERED NURSE

## 2024-10-11 PROCEDURE — 25000003 PHARM REV CODE 250: Performed by: REGISTERED NURSE

## 2024-10-11 RX ADMIN — SODIUM CHLORIDE 1000 ML: 9 INJECTION, SOLUTION INTRAVENOUS at 08:10

## 2024-10-11 NOTE — FIRST PROVIDER EVALUATION
"Medical screening examination initiated.  I have conducted a focused provider triage encounter, findings are as follows:    Brief history of present illness:  Hyperglycemia x 1.5 months, reports weakness/dizziness/fatigue    Vitals:    10/11/24 1649   BP: (!) 159/80   Pulse: (!) 114   Resp: 18   Temp: 98.7 °F (37.1 °C)   TempSrc: Oral   SpO2: 99%   Weight: 92.5 kg (204 lb)   Height: 4' 8" (1.422 m)       Pertinent physical exam:  Tachycardic, patient is alert and oriented and in no cute distress    Brief workup plan:  Workup    Preliminary workup initiated; this workup will be continued and followed by the physician or advanced practice provider that is assigned to the patient when roomed.  "

## 2024-10-12 ENCOUNTER — HOSPITAL ENCOUNTER (EMERGENCY)
Facility: HOSPITAL | Age: 54
Discharge: HOME | End: 2024-10-13
Attending: EMERGENCY MEDICINE
Payer: MEDICAID

## 2024-10-12 DIAGNOSIS — R07.9 CHEST PAIN: Primary | ICD-10-CM

## 2024-10-12 DIAGNOSIS — R73.9 HYPERGLYCEMIA: ICD-10-CM

## 2024-10-12 LAB
ALBUMIN SERPL BCP-MCNC: 3.6 G/DL (ref 3.5–5.2)
ALLENS TEST: ABNORMAL
ALP SERPL-CCNC: 113 U/L (ref 55–135)
ALT SERPL W/O P-5'-P-CCNC: 21 U/L (ref 10–44)
ANION GAP SERPL CALC-SCNC: 9 MMOL/L (ref 8–16)
AST SERPL-CCNC: 14 U/L (ref 10–40)
B-OH-BUTYR BLD STRIP-SCNC: 0 MMOL/L (ref 0–0.5)
BACTERIA #/AREA URNS HPF: NORMAL /HPF
BASOPHILS # BLD AUTO: 0 K/UL (ref 0–0.2)
BASOPHILS NFR BLD: 0 % (ref 0–1.9)
BILIRUB SERPL-MCNC: 0.2 MG/DL (ref 0.1–1)
BILIRUB UR QL STRIP: NEGATIVE
BNP SERPL-MCNC: <10 PG/ML (ref 0–99)
BUN SERPL-MCNC: 10 MG/DL (ref 6–20)
CALCIUM SERPL-MCNC: 9.7 MG/DL (ref 8.7–10.5)
CHLORIDE SERPL-SCNC: 100 MMOL/L (ref 95–110)
CLARITY UR: CLEAR
CO2 SERPL-SCNC: 25 MMOL/L (ref 23–29)
COLOR UR: YELLOW
CREAT SERPL-MCNC: 0.8 MG/DL (ref 0.5–1.4)
DIFFERENTIAL METHOD BLD: ABNORMAL
EOSINOPHIL # BLD AUTO: 0 K/UL (ref 0–0.5)
EOSINOPHIL NFR BLD: 0 % (ref 0–8)
ERYTHROCYTE [DISTWIDTH] IN BLOOD BY AUTOMATED COUNT: 14.6 % (ref 11.5–14.5)
EST. GFR  (NO RACE VARIABLE): >60 ML/MIN/1.73 M^2
GLUCOSE SERPL-MCNC: 289 MG/DL (ref 70–110)
GLUCOSE UR QL STRIP: ABNORMAL
HCO3 UR-SCNC: 30.5 MMOL/L (ref 24–28)
HCT VFR BLD AUTO: 30.7 % (ref 37–48.5)
HGB BLD-MCNC: 9.5 G/DL (ref 12–16)
HGB UR QL STRIP: NEGATIVE
IMM GRANULOCYTES # BLD AUTO: 0.05 K/UL (ref 0–0.04)
IMM GRANULOCYTES NFR BLD AUTO: 0.6 % (ref 0–0.5)
KETONES UR QL STRIP: NEGATIVE
LEUKOCYTE ESTERASE UR QL STRIP: NEGATIVE
LYMPHOCYTES # BLD AUTO: 2.8 K/UL (ref 1–4.8)
LYMPHOCYTES NFR BLD: 34.1 % (ref 18–48)
MAGNESIUM SERPL-MCNC: 1.9 MG/DL (ref 1.6–2.6)
MCH RBC QN AUTO: 23.9 PG (ref 27–31)
MCHC RBC AUTO-ENTMCNC: 30.9 G/DL (ref 32–36)
MCV RBC AUTO: 77 FL (ref 82–98)
MICROSCOPIC COMMENT: NORMAL
MONOCYTES # BLD AUTO: 0.5 K/UL (ref 0.3–1)
MONOCYTES NFR BLD: 6.4 % (ref 4–15)
NEUTROPHILS # BLD AUTO: 4.8 K/UL (ref 1.8–7.7)
NEUTROPHILS NFR BLD: 58.9 % (ref 38–73)
NITRITE UR QL STRIP: NEGATIVE
NRBC BLD-RTO: 0 /100 WBC
PCO2 BLDA: 55.7 MMHG (ref 35–45)
PH SMN: 7.35 [PH] (ref 7.35–7.45)
PH UR STRIP: 6 [PH] (ref 5–8)
PLATELET # BLD AUTO: 355 K/UL (ref 150–450)
PMV BLD AUTO: 9.4 FL (ref 9.2–12.9)
PO2 BLDA: 26 MMHG (ref 40–60)
POC BE: 5 MMOL/L
POC SATURATED O2: 42 % (ref 95–100)
POCT GLUCOSE: 197 MG/DL (ref 70–110)
POCT GLUCOSE: 411 MG/DL (ref 70–110)
POTASSIUM SERPL-SCNC: 4.4 MMOL/L (ref 3.5–5.1)
PROT SERPL-MCNC: 6.9 G/DL (ref 6–8.4)
PROT UR QL STRIP: NEGATIVE
RBC # BLD AUTO: 3.98 M/UL (ref 4–5.4)
SAMPLE: ABNORMAL
SITE: ABNORMAL
SODIUM SERPL-SCNC: 134 MMOL/L (ref 136–145)
SP GR UR STRIP: 1.02 (ref 1–1.03)
SQUAMOUS #/AREA URNS HPF: 1 /HPF
TROPONIN I SERPL DL<=0.01 NG/ML-MCNC: <0.006 NG/ML (ref 0–0.03)
URN SPEC COLLECT METH UR: ABNORMAL
UROBILINOGEN UR STRIP-ACNC: NEGATIVE EU/DL
WBC # BLD AUTO: 8.1 K/UL (ref 3.9–12.7)
YEAST URNS QL MICRO: NORMAL

## 2024-10-12 PROCEDURE — 81000 URINALYSIS NONAUTO W/SCOPE: CPT | Performed by: EMERGENCY MEDICINE

## 2024-10-12 PROCEDURE — 99900035 HC TECH TIME PER 15 MIN (STAT)

## 2024-10-12 PROCEDURE — 25500020 PHARM REV CODE 255: Performed by: EMERGENCY MEDICINE

## 2024-10-12 PROCEDURE — 82803 BLOOD GASES ANY COMBINATION: CPT

## 2024-10-12 PROCEDURE — 93010 ELECTROCARDIOGRAM REPORT: CPT | Mod: ,,, | Performed by: STUDENT IN AN ORGANIZED HEALTH CARE EDUCATION/TRAINING PROGRAM

## 2024-10-12 PROCEDURE — 85025 COMPLETE CBC W/AUTO DIFF WBC: CPT | Performed by: EMERGENCY MEDICINE

## 2024-10-12 PROCEDURE — 83735 ASSAY OF MAGNESIUM: CPT | Performed by: EMERGENCY MEDICINE

## 2024-10-12 PROCEDURE — 82010 KETONE BODYS QUAN: CPT | Performed by: EMERGENCY MEDICINE

## 2024-10-12 PROCEDURE — 80053 COMPREHEN METABOLIC PANEL: CPT | Performed by: EMERGENCY MEDICINE

## 2024-10-12 PROCEDURE — 93005 ELECTROCARDIOGRAM TRACING: CPT

## 2024-10-12 PROCEDURE — 99285 EMERGENCY DEPT VISIT HI MDM: CPT | Mod: 25

## 2024-10-12 PROCEDURE — 84484 ASSAY OF TROPONIN QUANT: CPT | Performed by: EMERGENCY MEDICINE

## 2024-10-12 PROCEDURE — 82962 GLUCOSE BLOOD TEST: CPT

## 2024-10-12 PROCEDURE — 83880 ASSAY OF NATRIURETIC PEPTIDE: CPT | Performed by: EMERGENCY MEDICINE

## 2024-10-12 RX ADMIN — IOHEXOL 100 ML: 350 INJECTION, SOLUTION INTRAVENOUS at 10:10

## 2024-10-12 NOTE — ED PROVIDER NOTES
SCRIBE #1 NOTE: I, Ritesh Gabriel, am scribing for, and in the presence of, Ayush Ward DO. I have scribed the entire note.       History     Chief Complaint   Patient presents with    Hyperglycemia    Weakness    Dizziness     Pt reports hyperglycemia states that she has been compliant with medications. Home reading was > 400     Review of patient's allergies indicates:   Allergen Reactions    Codeine Itching    Hydromorphone Other (See Comments)     Can't wake up for long time if taken    Slow to wake up after surgery after receiving    Aleve [naproxen sodium]      Increases BP    Ibuprofen      Increases BP    Neuromuscular blockers, steroidal Hives     some    Pregabalin Itching    Latex, natural rubber Rash    Morphine Rash     itching    Norco [hydrocodone-acetaminophen] Itching, Rash and Hallucinations    Secobarbital sodium Rash     itching    Tylox [oxycodone-acetaminophen] Rash         History of Present Illness     HPI    10/11/2024, 10:05 PM  History obtained from the patient      History of Present Illness: Yolanda Betts is a 54 y.o. female patient who presents to the Emergency Department for evaluation of hyperglycemia which onset a few weeks ago. Pt reports associated CP (since a heart cath which did not result in stents), dizziness, HA, SOB (since June/July), and HTN. Pt reports her insulin has been changed 2x this week. Pt denies any difficulty urinating, dysuria, fever, and chills. No further complaints or concerns at this time.       Arrival mode: Personal Transportation    PCP: Robe Britton MD        Past Medical History:  Past Medical History:   Diagnosis Date    Abnormal Pap smear of cervix     HPV genital warts    Anemia     Anxiety     Arthritis     Asthma 10/11/2016    Bipolar 1 disorder     Diabetes mellitus, type 2     Dyslipidemia associated with type 2 diabetes mellitus 05/13/2019    Dyspnea due to COVID-19 09/12/2024    General anesthetics causing adverse  effect in therapeutic use     Genital warts     GERD (gastroesophageal reflux disease)     Herpes simplex virus (HSV) infection     Hyperlipidemia     Hypertension     Hypertension complicating diabetes 2019    Migraine with aura and without status migrainosus, not intractable 2016    Mild persistent asthma without complication 10/11/2016    Morbid obesity with body mass index (BMI) of 45.0 to 49.9 in adult 2017    Obstructive sleep apnea     ALEN (obstructive sleep apnea)     2L per N/C q HS    Schizoaffective disorder, bipolar type 2019    Seasonal allergic rhinitis due to pollen 2019    Type 2 diabetes mellitus with hyperglycemia, with long-term current use of insulin 2017    Type 2 diabetes mellitus with hyperglycemia, without long-term current use of insulin 2017       Past Surgical History:  Past Surgical History:   Procedure Laterality Date    abcess removed right back      ANGIOGRAM, CORONARY, WITH LEFT HEART CATHETERIZATION N/A 2024    Procedure: Angiogram, Coronary, with Left Heart Cath;  Surgeon: Jaimie Wills MD;  Location: [facility] CATH LAB;  Service: Cardiology;  Laterality: N/A;    BELT ABDOMINOPLASTY  1996    BREAST SURGERY Bilateral 1996    Reduction    CARPAL TUNNEL RELEASE Bilateral 2023    Procedure: RELEASE, CARPAL TUNNEL;  Surgeon: Neville Roth MD;  Location: [facility] OR;  Service: Orthopedics;  Laterality: Bilateral;  bilateral carpal tunnel release     SECTION      X 2    COLONOSCOPY      COLONOSCOPY N/A 2018    Procedure: colonoscopy for iron deficiency anemia;  Surgeon: Frankie Sanchez MD;  Location: [facility];  Service: Endoscopy;  Laterality: N/A;    COLONOSCOPY N/A 2018    Procedure: COLONOSCOPY;  Surgeon: Frankie Sanchez MD;  Location: [facility];  Service: Endoscopy;  Laterality: N/A;    COLONOSCOPY N/A 3/10/2023    Procedure: COLONOSCOPY;  Surgeon: Lalita Montes De Oca MD;  Location: [facility];   "Service: Endoscopy;  Laterality: N/A;    COLONOSCOPY N/A 4/4/2024    Procedure: COLONOSCOPY;  Surgeon: Tara Og MD;  Location: [facility] ENDO;  Service: Endoscopy;  Laterality: N/A;    EPIDURAL STEROID INJECTION INTO CERVICAL SPINE N/A 1/31/2023    Procedure: C6/7 IL ROCAEL RN IV Sedation;  Surgeon: Otto Phillips MD;  Location: [facility];  Service: Pain Management;  Laterality: N/A;    EPIDURAL STEROID INJECTION INTO CERVICAL SPINE N/A 1/30/2024    Procedure: C5/6 IL ROCAEL;  Surgeon: Otto Phillips MD;  Location: [facility];  Service: Pain Management;  Laterality: N/A;    ESOPHAGOGASTRODUODENOSCOPY N/A 3/10/2023    Procedure: EGD (ESOPHAGOGASTRODUODENOSCOPY);  Surgeon: Lalita Montes De Oca MD;  Location: [facility] ENDO;  Service: Endoscopy;  Laterality: N/A;    HYSTERECTOMY      w/ BSO; hypermenorrhea    INJECTION OF ANESTHETIC AGENT AROUND MEDIAL BRANCH NERVES INNERVATING CERVICAL FACET JOINT Bilateral 12/19/2023    Procedure: Bilateral C5-7 MBB (diagnostic);  Surgeon: Otto Phillips MD;  [facility];  Service: Pain Management;  Laterality: Bilateral;    MOUTH SURGERY  1996    OOPHORECTOMY      hyst/bso, hypermenorrhea    ROBOT-ASSISTED CHOLECYSTECTOMY USING DA DARI XI N/A 1/3/2020    Procedure: XI ROBOTIC CHOLECYSTECTOMY;  Surgeon: Damir Driscoll MD;  Location: [facility] OR;  Service: General;  Laterality: N/A;    TOTAL REDUCTION MAMMOPLASTY      TUBAL LIGATION           Family History:  Family History   Problem Relation Name Age of Onset    Diabetes Mother      Hyperlipidemia Mother      Hypertension Mother      Asthma Mother      COPD Mother      Glaucoma Mother      Thyroid disease Mother      Anesthesia problems Mother          "almost had a cardiac arrest" , blood clots    Hypertension Father      Hyperlipidemia Father      Cancer Father          Brain, lung, liver, kidney    No Known Problems Sister      Hypertension Brother      Alcohol abuse Brother      Heart disease Maternal Grandmother "      Hyperlipidemia Maternal Grandmother      Hypertension Maternal Grandmother      Cataracts Maternal Grandmother      Diabetes Maternal Grandmother      Heart disease Maternal Grandfather      Hyperlipidemia Maternal Grandfather      Hypertension Maternal Grandfather      Glaucoma Maternal Grandfather      Cancer Maternal Grandfather      Cataracts Maternal Grandfather      Macular degeneration Maternal Grandfather      Diabetes Maternal Grandfather      Heart disease Paternal Grandmother      Hyperlipidemia Paternal Grandmother      Hypertension Paternal Grandmother      Cataracts Paternal Grandmother      Heart disease Paternal Grandfather      Hyperlipidemia Paternal Grandfather      Hypertension Paternal Grandfather      Cataracts Paternal Grandfather      Breast cancer Maternal Cousin      Breast cancer Maternal Cousin      Breast cancer Maternal Cousin      Breast cancer Maternal Cousin         Social History:  Social History     Tobacco Use    Smoking status: Never    Smokeless tobacco: Never   Substance and Sexual Activity    Alcohol use: Yes     Alcohol/week: 0.0 standard drinks of alcohol     Comment: socially  No alcohol 72h prior to sx    Drug use: No    Sexual activity: Yes     Partners: Male     Birth control/protection: Surgical     Comment: st        Review of Systems     Review of Systems   Constitutional:  Negative for chills and fever.   Respiratory:  Positive for shortness of breath.    Cardiovascular:  Positive for chest pain.        (+) HTN   Genitourinary:  Negative for difficulty urinating.   Neurological:  Positive for dizziness and headaches.        Physical Exam     Initial Vitals [10/11/24 1649]   BP Pulse Resp Temp SpO2   (!) 159/80 (!) 114 18 98.7 °F (37.1 °C) 99 %      MAP       --          Physical Exam  Vitals reviewed.   Constitutional:       Appearance: Normal appearance.   Cardiovascular:      Rate and Rhythm: Regular rhythm.      Heart sounds: No murmur heard.  Pulmonary:     "  Effort: Pulmonary effort is normal.      Breath sounds: No wheezing.   Abdominal:      General: There is no distension.      Palpations: Abdomen is soft.      Tenderness: There is no abdominal tenderness. There is no guarding.   Musculoskeletal:         General: Normal range of motion.   Skin:     General: Skin is warm and dry.      Findings: No rash.   Neurological:      General: No focal deficit present.      Mental Status: She is alert and oriented to person, place, and time.      Sensory: No sensory deficit.      Motor: No weakness.            ED Course   Procedures  ED Vital Signs:  Vitals:    10/11/24 1649 10/11/24 1945 10/11/24 2100 10/11/24 2200   BP: (!) 159/80 (!) 175/77 (!) 153/95 (!) 156/82   Pulse: (!) 114 106 104 101   Resp: 18 16 18 18   Temp: 98.7 °F (37.1 °C)      TempSrc: Oral      SpO2: 99% 99% 100% 100%   Weight: 92.5 kg (204 lb)      Height: 4' 8" (1.422 m)       10/11/24 2240 10/11/24 2300 10/11/24 2310   BP: (!) 151/83 (!) 155/86    Pulse: 101 100    Resp: 16 19    Temp: 99.8 °F (37.7 °C)  99.8 °F (37.7 °C)   TempSrc: Oral  Oral   SpO2:  98%    Weight:      Height:          Abnormal Lab Results:  Labs Reviewed   CBC W/ AUTO DIFFERENTIAL - Abnormal       Result Value    WBC 6.89      RBC 4.37      Hemoglobin 10.4 (*)     Hematocrit 34.3 (*)     MCV 79 (*)     MCH 23.8 (*)     MCHC 30.3 (*)     RDW 14.6 (*)     Platelets 381      MPV 9.4      Immature Granulocytes 0.4      Gran # (ANC) 3.6      Immature Grans (Abs) 0.03      Lymph # 2.8      Mono # 0.5      Eos # 0.0      Baso # 0.00      nRBC 0      Gran % 52.5      Lymph % 40.3      Mono % 6.8      Eosinophil % 0.0      Basophil % 0.0      Differential Method Automated     COMPREHENSIVE METABOLIC PANEL - Abnormal    Sodium 137      Potassium 5.2 (*)     Chloride 100      CO2 26      Glucose 380 (*)     BUN 9      Creatinine 1.0      Calcium 10.4      Total Protein 7.4      Albumin 3.8      Total Bilirubin 0.2      Alkaline Phosphatase 115   "    AST 15      ALT 20      eGFR >60      Anion Gap 11     URINALYSIS, REFLEX TO URINE CULTURE - Abnormal    Specimen UA Urine, Clean Catch      Color, UA Yellow      Appearance, UA Clear      pH, UA 6.0      Specific Gravity, UA 1.025      Protein, UA Negative      Glucose, UA 4+ (*)     Ketones, UA Negative      Bilirubin (UA) Negative      Occult Blood UA Negative      Nitrite, UA Negative      Urobilinogen, UA Negative      Leukocytes, UA Negative      Narrative:     Specimen Source->Urine   URINALYSIS MICROSCOPIC - Abnormal    Bacteria None      Yeast, UA Rare (*)     Microscopic Comment SEE COMMENT      Narrative:     Specimen Source->Urine   POCT GLUCOSE - Abnormal    POCT Glucose 436 (*)    ISTAT PROCEDURE - Abnormal    POC PH 7.382      POC PCO2 52.2 (*)     POC PO2 44      POC HCO3 31.1 (*)     POC BE 6 (*)     POC SATURATED O2 77      Sample VENOUS      Site Other      Allens Test N/A     POCT GLUCOSE - Abnormal    POCT Glucose 295 (*)    BETA - HYDROXYBUTYRATE, SERUM    Beta-Hydroxybutyrate 0.1     TROPONIN I    Troponin I <0.006     B-TYPE NATRIURETIC PEPTIDE    BNP <10          All Lab Results:  Results for orders placed or performed during the hospital encounter of 10/11/24   POCT glucose    Collection Time: 10/11/24  4:50 PM   Result Value Ref Range    POCT Glucose 436 (H) 70 - 110 mg/dL   EKG 12-lead    Collection Time: 10/11/24  5:38 PM   Result Value Ref Range    QRS Duration 74 ms    [facility] QTC Calculation 418 ms   CBC auto differential    Collection Time: 10/11/24  5:58 PM   Result Value Ref Range    WBC 6.89 3.90 - 12.70 K/uL    RBC 4.37 4.00 - 5.40 M/uL    Hemoglobin 10.4 (L) 12.0 - 16.0 g/dL    Hematocrit 34.3 (L) 37.0 - 48.5 %    MCV 79 (L) 82 - 98 fL    MCH 23.8 (L) 27.0 - 31.0 pg    MCHC 30.3 (L) 32.0 - 36.0 g/dL    RDW 14.6 (H) 11.5 - 14.5 %    Platelets 381 150 - 450 K/uL    MPV 9.4 9.2 - 12.9 fL    Immature Granulocytes 0.4 0.0 - 0.5 %    Gran # (ANC) 3.6 1.8 - 7.7 K/uL    Immature Grans  (Abs) 0.03 0.00 - 0.04 K/uL    Lymph # 2.8 1.0 - 4.8 K/uL    Mono # 0.5 0.3 - 1.0 K/uL    Eos # 0.0 0.0 - 0.5 K/uL    Baso # 0.00 0.00 - 0.20 K/uL    nRBC 0 0 /100 WBC    Gran % 52.5 38.0 - 73.0 %    Lymph % 40.3 18.0 - 48.0 %    Mono % 6.8 4.0 - 15.0 %    Eosinophil % 0.0 0.0 - 8.0 %    Basophil % 0.0 0.0 - 1.9 %    Differential Method Automated    Comprehensive metabolic panel    Collection Time: 10/11/24  5:58 PM   Result Value Ref Range    Sodium 137 136 - 145 mmol/L    Potassium 5.2 (H) 3.5 - 5.1 mmol/L    Chloride 100 95 - 110 mmol/L    CO2 26 23 - 29 mmol/L    Glucose 380 (H) 70 - 110 mg/dL    BUN 9 6 - 20 mg/dL    Creatinine 1.0 0.5 - 1.4 mg/dL    Calcium 10.4 8.7 - 10.5 mg/dL    Total Protein 7.4 6.0 - 8.4 g/dL    Albumin 3.8 3.5 - 5.2 g/dL    Total Bilirubin 0.2 0.1 - 1.0 mg/dL    Alkaline Phosphatase 115 55 - 135 U/L    AST 15 10 - 40 U/L    ALT 20 10 - 44 U/L    eGFR >60 >60 mL/min/1.73 m^2    Anion Gap 11 8 - 16 mmol/L   Beta - Hydroxybutyrate, Serum    Collection Time: 10/11/24  5:58 PM   Result Value Ref Range    Beta-Hydroxybutyrate 0.1 0.0 - 0.5 mmol/L   Troponin I    Collection Time: 10/11/24  5:58 PM   Result Value Ref Range    Troponin I <0.006 0.000 - 0.026 ng/mL   Brain natriuretic peptide    Collection Time: 10/11/24  5:58 PM   Result Value Ref Range    BNP <10 0 - 99 pg/mL   ISTAT PROCEDURE    Collection Time: 10/11/24  8:05 PM   Result Value Ref Range    POC PH 7.382 7.35 - 7.45    POC PCO2 52.2 (H) 35 - 45 mmHg    POC PO2 44 40 - 60 mmHg    POC HCO3 31.1 (H) 24 - 28 mmol/L    POC BE 6 (H) -2 to 2 mmol/L    POC SATURATED O2 77 95 - 100 %    Sample VENOUS     Site Other     Allens Test N/A    POCT glucose    Collection Time: 10/11/24  9:11 PM   Result Value Ref Range    POCT Glucose 295 (H) 70 - 110 mg/dL   Urinalysis, Reflex to Urine Culture Urine, Clean Catch    Collection Time: 10/11/24 10:23 PM    Specimen: Urine   Result Value Ref Range    Specimen UA Urine, Clean Catch     Color, UA  Yellow Yellow, Straw, Milagros    Appearance, UA Clear Clear    pH, UA 6.0 5.0 - 8.0    Specific Gravity, UA 1.025 1.005 - 1.030    Protein, UA Negative Negative    Glucose, UA 4+ (A) Negative    Ketones, UA Negative Negative    Bilirubin (UA) Negative Negative    Occult Blood UA Negative Negative    Nitrite, UA Negative Negative    Urobilinogen, UA Negative <2.0 EU/dL    Leukocytes, UA Negative Negative   Urinalysis Microscopic    Collection Time: 10/11/24 10:23 PM   Result Value Ref Range    Bacteria None None-Occ /hpf    Yeast, UA Rare (A) None    Microscopic Comment SEE COMMENT      *Note: Due to a large number of results and/or encounters for the requested time period, some results have not been displayed. A complete set of results can be found in Results Review.         Imaging Results:  Imaging Results              X-Ray Chest AP Portable (Final result)  Result time 10/11/24 17:35:19      Final result by Jani Canela MD (10/11/24 17:35:19)                   Impression:      No acute abnormality.      Electronically signed by: Jani Canela  Date:    10/11/2024  Time:    17:35               Narrative:    EXAMINATION:  XR CHEST AP PORTABLE    CLINICAL HISTORY:  hyperglycemia;    TECHNIQUE:  Single frontal view of the chest was performed.    COMPARISON:  None    FINDINGS:  The lungs are clear, with normal appearance of pulmonary vasculature and no pleural effusion or pneumothorax.    The cardiac silhouette is normal in size. The hilar and mediastinal contours are unremarkable.    Bones are intact.                                       The EKG was ordered, reviewed, and independently interpreted by the ED provider.  Interpretation time: 17:38  Rate: 111 BPM  Rhythm: sinus tachycardia  Interpretation: Cannot rule out anterior infarct. No STEMI.           The Emergency Provider reviewed the vital signs and test results, which are outlined above.     ED Discussion     11:07 PM: Reassessed pt at this time. Discussed  with pt all pertinent ED information and results. Discussed pt dx and plan of tx. Gave pt all f/u and return to the ED instructions. All questions and concerns were addressed at this time. Pt expresses understanding of information and instructions, and is comfortable with plan to discharge. Pt is stable for discharge.    I discussed with patient and/or family/caretaker that evaluation in the ED does not suggest any emergent or life threatening medical conditions requiring immediate intervention beyond what was provided in the ED, and I believe patient is safe for discharge.  Regardless, an unremarkable evaluation in the ED does not preclude the development or presence of a serious of life threatening condition. As such, patient was instructed to return immediately for any worsening or change in current symptoms.      ED Course as of 10/13/24 0214   Fri Oct 11, 2024   2206 X-Ray Chest AP Portable  No acute findings [CD]   2206 POCT glucose(!)  Hyperglycemia [CD]   2207 Brain natriuretic peptide  Within normal limits [CD]   2207 Troponin I  Within normal limits [CD]   2207 Comprehensive metabolic panel(!)  Nonspecific findings [CD]   2207 CBC auto differential(!)  Nonspecific finding [CD]   2207 Beta - Hydroxybutyrate, Serum  Thin normal limits [CD]   2207 ISTAT PROCEDURE(!)  No acidosis [CD]   2254 Urinalysis, Reflex to Urine Culture Urine, Clean Catch(!)  No UTI [CD]      ED Course User Index  [CD] Ayush Ward, DO     Medical Decision Making  Glucose has improved patient is able to tolerate p.o. fluids.  Workup is unremarkable.  Instructed to return immediately for any new or worsening symptoms and she verbalized understanding.    Amount and/or Complexity of Data Reviewed  Labs: ordered. Decision-making details documented in ED Course.  Radiology: ordered. Decision-making details documented in ED Course.  ECG/medicine tests: ordered and independent interpretation performed. Decision-making details  documented in ED Course.    Risk  Risk Details: Differential diagnosis includes but is not limited to: ACS, heart failure, DKA, noncompliance with medication, electrolyte abnormality, UTI                ED Medication(s):  Medications   sodium chloride 0.9% bolus 1,000 mL 1,000 mL (0 mLs Intravenous Stopped 10/11/24 2153)       Discharge Medication List as of 10/11/2024 10:57 PM           Follow-up Information       Schedule an appointment as soon as possible for a visit  with Robe Britton MD.    Specialty: Family Medicine  Contact information:  [address] [facility] [address]  [phone]                                 Scribe Attestation:   Scribe #1: I performed the above scribed service and the documentation accurately describes the services I performed. I attest to the accuracy of the note.     Attending:   Physician Attestation Statement for Scribe #1: I, Ayush Ward DO, personally performed the services described in this documentation, as scribed by Ritesh Gabriel, in my presence, and it is both accurate and complete.           Clinical Impression       ICD-10-CM ICD-9-CM   1. Hyperglycemia  R73.9 790.29       Disposition:   Disposition: Discharged  Condition: Stable         Ayush Ward DO  10/13/24 0216

## 2024-10-13 VITALS
SYSTOLIC BLOOD PRESSURE: 130 MMHG | RESPIRATION RATE: 16 BRPM | BODY MASS INDEX: 45.89 KG/M2 | DIASTOLIC BLOOD PRESSURE: 63 MMHG | HEIGHT: 56 IN | TEMPERATURE: 99 F | HEART RATE: 101 BPM | OXYGEN SATURATION: 96 % | WEIGHT: 204 LBS

## 2024-10-13 LAB
OHS QRS DURATION: 74 MS
OHS QTC CALCULATION: 418 MS

## 2024-10-13 NOTE — ED PROVIDER NOTES
"SCRIBE #1 NOTE: I, Me-Bhavesh Jarad, am scribing for, and in the presence of, Ayush Ward DO. I have scribed the entire note.       History     Chief Complaint   Patient presents with    Chest Pain     Mid-sternal "For months"     Hyperglycemia     Seen yesterday for same thing. CBG at home >400. CBG in triage 411. Pt is on insulin pump. Expressing concerns for DKA.      Review of patient's allergies indicates:   Allergen Reactions    Codeine Itching    Hydromorphone Other (See Comments)     Can't wake up for long time if taken    Slow to wake up after surgery after receiving    Aleve [naproxen sodium]      Increases BP    Ibuprofen      Increases BP    Neuromuscular blockers, steroidal Hives     some    Pregabalin Itching    Latex, natural rubber Rash    Morphine Rash     itching    Norco [hydrocodone-acetaminophen] Itching, Rash and Hallucinations    Secobarbital sodium Rash     itching    Tylox [oxycodone-acetaminophen] Rash         History of Present Illness     HPI    10/12/2024, 8:39 PM  History obtained from the patient      History of Present Illness: Yolanda Betts is a 54 y.o. female patient with a PMHx of anemia, asthma, bipolar 1 disorder, DM2, GERD, HLD, and HTN who presents to the Emergency Department for evaluation of CP and hyperglycemia. Patient was seen yesterday for the same complaints. Pt has been having mid-sternal CP for months now and patient states that it has not improved since yesterday, and pt's CBG was over 400 at home today. Symptoms are constant and moderate in severity. No mitigating or exacerbating factors reported. Patient denies all other sxs at this time. Pt is on insulin pumps. No further complaints or concerns at this time.       Arrival mode: Personal vehicle    PCP: Robe Britton MD        Past Medical History:  Past Medical History:   Diagnosis Date    Abnormal Pap smear of cervix     HPV genital warts    Anemia     Anxiety     Arthritis     Asthma " 10/11/2016    Bipolar 1 disorder     Diabetes mellitus, type 2     Dyslipidemia associated with type 2 diabetes mellitus 2019    Dyspnea due to COVID-19 2024    General anesthetics causing adverse effect in therapeutic use     Genital warts     GERD (gastroesophageal reflux disease)     Herpes simplex virus (HSV) infection     Hyperlipidemia     Hypertension     Hypertension complicating diabetes 2019    Migraine with aura and without status migrainosus, not intractable 2016    Mild persistent asthma without complication 10/11/2016    Morbid obesity with body mass index (BMI) of 45.0 to 49.9 in adult 2017    Obstructive sleep apnea     ALEN (obstructive sleep apnea)     2L per N/C q HS    Schizoaffective disorder, bipolar type 2019    Seasonal allergic rhinitis due to pollen 2019    Type 2 diabetes mellitus with hyperglycemia, with long-term current use of insulin 2017    Type 2 diabetes mellitus with hyperglycemia, without long-term current use of insulin 2017       Past Surgical History:  Past Surgical History:   Procedure Laterality Date    abcess removed right back      ANGIOGRAM, CORONARY, WITH LEFT HEART CATHETERIZATION N/A 2024    Procedure: Angiogram, Coronary, with Left Heart Cath;  Surgeon: Jaimie Wills MD;  Location: [facility] CATH LAB;  Service: Cardiology;  Laterality: N/A;    BELT ABDOMINOPLASTY      BREAST SURGERY Bilateral     Reduction    CARPAL TUNNEL RELEASE Bilateral 2023    Procedure: RELEASE, CARPAL TUNNEL;  Surgeon: Neville Roth MD;  Location: [facility] OR;  Service: Orthopedics;  Laterality: Bilateral;  bilateral carpal tunnel release     SECTION      X 2    COLONOSCOPY      COLONOSCOPY N/A 2018    Procedure: colonoscopy for iron deficiency anemia;  Surgeon: Frankie Sanchez MD;  Location: [facility] ENDO;  Service: Endoscopy;  Laterality: N/A;    COLONOSCOPY N/A 2018    Procedure: COLONOSCOPY;   "Surgeon: Frankie Sanchez MD;  Location: [facility] ENDO;  Service: Endoscopy;  Laterality: N/A;    COLONOSCOPY N/A 3/10/2023    Procedure: COLONOSCOPY;  Surgeon: Lalita Montes De Oca MD;  Location: [facility] ENDO;  Service: Endoscopy;  Laterality: N/A;    COLONOSCOPY N/A 4/4/2024    Procedure: COLONOSCOPY;  Surgeon: Tara Og MD;  Location: [facility] ENDO;  Service: Endoscopy;  Laterality: N/A;    EPIDURAL STEROID INJECTION INTO CERVICAL SPINE N/A 1/31/2023    Procedure: C6/7 IL ROCAEL RN IV Sedation;  Surgeon: Otto Phillips MD;  Location: [facility] PAIN MGT;  Service: Pain Management;  Laterality: N/A;    EPIDURAL STEROID INJECTION INTO CERVICAL SPINE N/A 1/30/2024    Procedure: C5/6 IL ROCAEL;  Surgeon: Otto Phillips MD;  Location: [facility];  Service: Pain Management;  Laterality: N/A;    ESOPHAGOGASTRODUODENOSCOPY N/A 3/10/2023    Procedure: EGD (ESOPHAGOGASTRODUODENOSCOPY);  Surgeon: Lalita Montes De Oca MD;  Location: [facility];  Service: Endoscopy;  Laterality: N/A;    HYSTERECTOMY      w/ BSO; hypermenorrhea    INJECTION OF ANESTHETIC AGENT AROUND MEDIAL BRANCH NERVES INNERVATING CERVICAL FACET JOINT Bilateral 12/19/2023    Procedure: Bilateral C5-7 MBB (diagnostic);  Surgeon: Otto Phillips MD;  Location: [facility];  Service: Pain Management;  Laterality: Bilateral;    MOUTH SURGERY  1996    OOPHORECTOMY      hyst/bso, hypermenorrhea    ROBOT-ASSISTED CHOLECYSTECTOMY USING DA DARI XI N/A 1/3/2020    Procedure: XI ROBOTIC CHOLECYSTECTOMY;  Surgeon: Damir Driscoll MD;  Location: [facility] OR;  Service: General;  Laterality: N/A;    TOTAL REDUCTION MAMMOPLASTY      TUBAL LIGATION           Family History:  Family History   Problem Relation Name Age of Onset    Diabetes Mother      Hyperlipidemia Mother      Hypertension Mother      Asthma Mother      COPD Mother      Glaucoma Mother      Thyroid disease Mother      Anesthesia problems Mother          "almost had a cardiac arrest" , blood clots    Hypertension " Father      Hyperlipidemia Father      Cancer Father          Brain, lung, liver, kidney    No Known Problems Sister      Hypertension Brother      Alcohol abuse Brother      Heart disease Maternal Grandmother      Hyperlipidemia Maternal Grandmother      Hypertension Maternal Grandmother      Cataracts Maternal Grandmother      Diabetes Maternal Grandmother      Heart disease Maternal Grandfather      Hyperlipidemia Maternal Grandfather      Hypertension Maternal Grandfather      Glaucoma Maternal Grandfather      Cancer Maternal Grandfather      Cataracts Maternal Grandfather      Macular degeneration Maternal Grandfather      Diabetes Maternal Grandfather      Heart disease Paternal Grandmother      Hyperlipidemia Paternal Grandmother      Hypertension Paternal Grandmother      Cataracts Paternal Grandmother      Heart disease Paternal Grandfather      Hyperlipidemia Paternal Grandfather      Hypertension Paternal Grandfather      Cataracts Paternal Grandfather      Breast cancer Maternal Cousin      Breast cancer Maternal Cousin      Breast cancer Maternal Cousin      Breast cancer Maternal Cousin         Social History:  Social History     Tobacco Use    Smoking status: Never    Smokeless tobacco: Never   Substance and Sexual Activity    Alcohol use: Yes     Alcohol/week: 0.0 standard drinks of alcohol     Comment: socially  No alcohol 72h prior to sx    Drug use: No    Sexual activity: Yes     Partners: Male     Birth control/protection: Surgical     Comment: hyst        Review of Systems     Review of Systems   Cardiovascular:  Positive for chest pain (mid-sternal).        (+) hyperglycemia        Physical Exam     Initial Vitals [10/12/24 1855]   BP Pulse Resp Temp SpO2   130/86 (!) 112 18 99.4 °F (37.4 °C) 100 %      MAP       --          Physical Exam  Vitals reviewed.   Constitutional:       Appearance: She is well-developed.   Cardiovascular:      Comments: Tachycardic rate, regular rhythm, no murmurs  "noted  Pulmonary:      Effort: Pulmonary effort is normal.      Breath sounds: No wheezing.   Abdominal:      Palpations: Abdomen is soft.      Tenderness: There is no abdominal tenderness.   Musculoskeletal:         General: Normal range of motion.   Skin:     General: Skin is warm and dry.      Findings: No rash.   Neurological:      General: No focal deficit present.      Mental Status: She is alert and oriented to person, place, and time.      Sensory: No sensory deficit.      Motor: No weakness.          ED Course   Procedures  ED Vital Signs:  Vitals:    10/12/24 1855 10/12/24 2017 10/12/24 2032 10/12/24 2103   BP: 130/86 (!) 150/87  130/77   Pulse: (!) 112 107 106    Resp: 18 17 18    Temp: 99.4 °F (37.4 °C)      TempSrc: Oral      SpO2: 100% 99% 100%    Weight: 92.5 kg (204 lb)      Height: 4' 8" (1.422 m)       10/12/24 2104 10/13/24 0017 10/13/24 0117   BP:  133/74 130/63   Pulse: 104 104 101   Resp: 20 (!) 25 16   Temp:      TempSrc:      SpO2: 100% 98% 96%   Weight:      Height:          Abnormal Lab Results:  Labs Reviewed   CBC W/ AUTO DIFFERENTIAL - Abnormal       Result Value    WBC 8.10      RBC 3.98 (*)     Hemoglobin 9.5 (*)     Hematocrit 30.7 (*)     MCV 77 (*)     MCH 23.9 (*)     MCHC 30.9 (*)     RDW 14.6 (*)     Platelets 355      MPV 9.4      Immature Granulocytes 0.6 (*)     Gran # (ANC) 4.8      Immature Grans (Abs) 0.05 (*)     Lymph # 2.8      Mono # 0.5      Eos # 0.0      Baso # 0.00      nRBC 0      Gran % 58.9      Lymph % 34.1      Mono % 6.4      Eosinophil % 0.0      Basophil % 0.0      Differential Method Automated     COMPREHENSIVE METABOLIC PANEL - Abnormal    Sodium 134 (*)     Potassium 4.4      Chloride 100      CO2 25      Glucose 289 (*)     BUN 10      Creatinine 0.8      Calcium 9.7      Total Protein 6.9      Albumin 3.6      Total Bilirubin 0.2      Alkaline Phosphatase 113      AST 14      ALT 21      eGFR >60      Anion Gap 9     URINALYSIS, REFLEX TO URINE CULTURE " - Abnormal    Specimen UA Urine, Clean Catch      Color, UA Yellow      Appearance, UA Clear      pH, UA 6.0      Specific Gravity, UA 1.020      Protein, UA Negative      Glucose, UA 4+ (*)     Ketones, UA Negative      Bilirubin (UA) Negative      Occult Blood UA Negative      Nitrite, UA Negative      Urobilinogen, UA Negative      Leukocytes, UA Negative      Narrative:     Specimen Source->Urine   POCT GLUCOSE - Abnormal    POCT Glucose 411 (*)    ISTAT PROCEDURE - Abnormal    POC PH 7.346 (*)     POC PCO2 55.7 (*)     POC PO2 26 (*)     POC HCO3 30.5 (*)     POC BE 5 (*)     POC SATURATED O2 42      Sample VENOUS      Site Other      Allens Test N/A     POCT GLUCOSE - Abnormal    POCT Glucose 197 (*)    MAGNESIUM    Magnesium 1.9     TROPONIN I    Troponin I <0.006     B-TYPE NATRIURETIC PEPTIDE    BNP <10     BETA - HYDROXYBUTYRATE, SERUM    Beta-Hydroxybutyrate 0.0     URINALYSIS MICROSCOPIC    Bacteria Rare      Yeast, UA None      Squam Epithel, UA 1      Microscopic Comment SEE COMMENT      Narrative:     Specimen Source->Urine        All Lab Results:  Results for orders placed or performed during the hospital encounter of 10/12/24   POCT glucose    Collection Time: 10/12/24  6:53 PM   Result Value Ref Range    POCT Glucose 411 (H) 70 - 110 mg/dL   EKG 12-lead    Collection Time: 10/12/24  8:17 PM   Result Value Ref Range    QRS Duration 78 ms    OHS QTC Calculation 437 ms   ISTAT PROCEDURE    Collection Time: 10/12/24  8:32 PM   Result Value Ref Range    POC PH 7.346 (L) 7.35 - 7.45    POC PCO2 55.7 (H) 35 - 45 mmHg    POC PO2 26 (LL) 40 - 60 mmHg    POC HCO3 30.5 (H) 24 - 28 mmol/L    POC BE 5 (H) -2 to 2 mmol/L    POC SATURATED O2 42 95 - 100 %    Sample VENOUS     Site Other     Allens Test N/A    CBC auto differential    Collection Time: 10/12/24  8:53 PM   Result Value Ref Range    WBC 8.10 3.90 - 12.70 K/uL    RBC 3.98 (L) 4.00 - 5.40 M/uL    Hemoglobin 9.5 (L) 12.0 - 16.0 g/dL    Hematocrit 30.7  (L) 37.0 - 48.5 %    MCV 77 (L) 82 - 98 fL    MCH 23.9 (L) 27.0 - 31.0 pg    MCHC 30.9 (L) 32.0 - 36.0 g/dL    RDW 14.6 (H) 11.5 - 14.5 %    Platelets 355 150 - 450 K/uL    MPV 9.4 9.2 - 12.9 fL    Immature Granulocytes 0.6 (H) 0.0 - 0.5 %    Gran # (ANC) 4.8 1.8 - 7.7 K/uL    Immature Grans (Abs) 0.05 (H) 0.00 - 0.04 K/uL    Lymph # 2.8 1.0 - 4.8 K/uL    Mono # 0.5 0.3 - 1.0 K/uL    Eos # 0.0 0.0 - 0.5 K/uL    Baso # 0.00 0.00 - 0.20 K/uL    nRBC 0 0 /100 WBC    Gran % 58.9 38.0 - 73.0 %    Lymph % 34.1 18.0 - 48.0 %    Mono % 6.4 4.0 - 15.0 %    Eosinophil % 0.0 0.0 - 8.0 %    Basophil % 0.0 0.0 - 1.9 %    Differential Method Automated    Comprehensive metabolic panel    Collection Time: 10/12/24  8:53 PM   Result Value Ref Range    Sodium 134 (L) 136 - 145 mmol/L    Potassium 4.4 3.5 - 5.1 mmol/L    Chloride 100 95 - 110 mmol/L    CO2 25 23 - 29 mmol/L    Glucose 289 (H) 70 - 110 mg/dL    BUN 10 6 - 20 mg/dL    Creatinine 0.8 0.5 - 1.4 mg/dL    Calcium 9.7 8.7 - 10.5 mg/dL    Total Protein 6.9 6.0 - 8.4 g/dL    Albumin 3.6 3.5 - 5.2 g/dL    Total Bilirubin 0.2 0.1 - 1.0 mg/dL    Alkaline Phosphatase 113 55 - 135 U/L    AST 14 10 - 40 U/L    ALT 21 10 - 44 U/L    eGFR >60 >60 mL/min/1.73 m^2    Anion Gap 9 8 - 16 mmol/L   Magnesium    Collection Time: 10/12/24  8:53 PM   Result Value Ref Range    Magnesium 1.9 1.6 - 2.6 mg/dL   Troponin I    Collection Time: 10/12/24  8:53 PM   Result Value Ref Range    Troponin I <0.006 0.000 - 0.026 ng/mL   Brain natriuretic peptide    Collection Time: 10/12/24  8:53 PM   Result Value Ref Range    BNP <10 0 - 99 pg/mL   Beta - Hydroxybutyrate, Serum    Collection Time: 10/12/24  8:53 PM   Result Value Ref Range    Beta-Hydroxybutyrate 0.0 0.0 - 0.5 mmol/L   Urinalysis, Reflex to Urine Culture Urine, Clean Catch    Collection Time: 10/12/24 10:05 PM    Specimen: Urine, Clean Catch   Result Value Ref Range    Specimen UA Urine, Clean Catch     Color, UA Yellow Yellow, Straw, Milagros     Appearance, UA Clear Clear    pH, UA 6.0 5.0 - 8.0    Specific Gravity, UA 1.020 1.005 - 1.030    Protein, UA Negative Negative    Glucose, UA 4+ (A) Negative    Ketones, UA Negative Negative    Bilirubin (UA) Negative Negative    Occult Blood UA Negative Negative    Nitrite, UA Negative Negative    Urobilinogen, UA Negative <2.0 EU/dL    Leukocytes, UA Negative Negative   Urinalysis Microscopic    Collection Time: 10/12/24 10:05 PM   Result Value Ref Range    Bacteria Rare None-Occ /hpf    Yeast, UA None None    Squam Epithel, UA 1 /hpf    Microscopic Comment SEE COMMENT    POCT glucose    Collection Time: 10/12/24 11:33 PM   Result Value Ref Range    POCT Glucose 197 (H) 70 - 110 mg/dL     *Note: Due to a large number of results and/or encounters for the requested time period, some results have not been displayed. A complete set of results can be found in Results Review.         Imaging Results:  Imaging Results              CTA Chest Non-Coronary (PE Studies) (Final result)  Result time 10/12/24 22:38:51      Final result by Jani Canela MD (10/12/24 22:38:51)                   Impression:      No pulmonary embolism.  No dissection.  No pneumonia.    All CT scans   are performed using dose optimization techniques including the following: automated exposure control; adjustment of the mA and/or kV; use of iterative reconstruction technique.  Dose modulation was employed for ALARA by means of: Automated exposure control; adjustment of the mA and/or kV according to patient size (this includes techniques or standardized protocols for targeted exams where dose is matched to indication/reason for exam; i.e. extremities or head); and/or use of iterative reconstructive technique.      Electronically signed by: Jani Canela  Date:    10/12/2024  Time:    22:38               Narrative:    EXAMINATION:  CTA CHEST NON CORONARY (PE STUDIES)    CLINICAL HISTORY:  Pulmonary embolism (PE) suspected, high  prob;    TECHNIQUE:  Low dose axial images, sagittal and coronal reformations were obtained from the thoracic inlet to the lung bases following the IV administration of 100 mL of Omnipaque 350.  Contrast timing was optimized to evaluate the pulmonary arteries.  MIP images were performed.    COMPARISON:  None    FINDINGS:  No evidence for pulmonary embolism.  No evidence for aortic dissection or aneurysm.  Cardiovascular structures have a normal non gated appearance.  Lungs are essentially clear.  No acute osseous injury.  Mild mixing artifact and motion artifacts.  Hepatomegaly.  No coronary artery calcification.                                       The EKG was ordered, reviewed, and independently interpreted by the ED provider.  Interpretation time: 20:17  Rate: 107 BPM  Rhythm: sinus tachycardia  Interpretation: Septal infarct (cited on or before 19-SEP-2024). No STEMI.         The Emergency Provider reviewed the vital signs and test results, which are outlined above.     ED Discussion       1:22 AM: Reassessed pt at this time.  Discussed with pt all pertinent ED information and results. Discussed pt dx and plan of tx. Gave pt all f/u and return to the ED instructions. All questions and concerns were addressed at this time. Pt expresses understanding of information and instructions, and is comfortable with plan to discharge. Pt is stable for discharge.    I discussed with patient and/or family/caretaker that evaluation in the ED does not suggest any emergent or life threatening medical conditions requiring immediate intervention beyond what was provided in the ED, and I believe patient is safe for discharge.  Regardless, an unremarkable evaluation in the ED does not preclude the development or presence of a serious of life threatening condition. As such, patient was instructed to return immediately for any worsening or change in current symptoms.      ED Course as of 10/13/24 2350   Sat Oct 12, 2024   2555 CTA  Chest Non-Coronary (PE Studies)  No acute findings [CD]   2245 Urinalysis, Reflex to Urine Culture Urine, Clean Catch(!)  No UTI [CD]   2246 Troponin I  Within normal limits [CD]   2246 Brain natriuretic peptide  Within normal limits [CD]   2246 Magnesium  Within normal limits [CD]   2246 Comprehensive metabolic panel(!)  Nonspecific finding [CD]   2246 Beta - Hydroxybutyrate, Serum  Within normal limit [CD]   2246 CBC auto differential(!)  Microcytic anemia [CD]   2246 POCT glucose(!)  Hyperglycemia [CD]      ED Course User Index  [CD] Ayush Ward, DO     Medical Decision Making  Patient able to tolerate fluids.  Workup is unremarkable.  Instructed to return immediately for any new or worsening symptoms and she verbalized understanding.    Amount and/or Complexity of Data Reviewed  Labs: ordered. Decision-making details documented in ED Course.  Radiology: ordered and independent interpretation performed. Decision-making details documented in ED Course.  ECG/medicine tests: ordered and independent interpretation performed. Decision-making details documented in ED Course.    Risk  Prescription drug management.  Risk Details: Differential diagnosis includes but is not limited to: ACS, heart failure, pulmonary embolism, DKA, noncompliance with medications                ED Medication(s):  Medications   iohexoL (OMNIPAQUE 350) injection 100 mL (100 mLs Intravenous Given 10/12/24 2224)       Discharge Medication List as of 10/13/2024  1:21 AM           Follow-up Information       Schedule an appointment as soon as possible for a visit  with Robe Britton MD.    Specialty: Family Medicine  Contact information:  [address] [place] [id number]  [phone]                                 Scribe Attestation:   Scribe #1: I performed the above scribed service and the documentation accurately describes the services I performed. I attest to the accuracy of the note.     Attending:   Physician  Attestation Statement for Scribe #1: I, Ayush Ward DO, personally performed the services described in this documentation, as scribed by Arely Abraham, in my presence, and it is both accurate and complete.           Clinical Impression       ICD-10-CM ICD-9-CM   1. Chest pain  R07.9 786.50   2. Hyperglycemia  R73.9 790.29       Disposition:   Disposition: Discharged  Condition: Stable         Ayush Ward DO  10/13/24 0861

## 2024-10-14 ENCOUNTER — TELEPHONE (OUTPATIENT)
Dept: CARDIOLOGY | Facility: CLINIC | Age: 54
End: 2024-10-14
Payer: MEDICAID

## 2024-10-14 ENCOUNTER — TELEPHONE (OUTPATIENT)
Dept: PSYCHIATRY | Facility: CLINIC | Age: 54
End: 2024-10-14
Payer: MEDICAID

## 2024-10-14 DIAGNOSIS — E26.9 HYPERALDOSTERONISM: Primary | ICD-10-CM

## 2024-10-14 LAB
OHS QRS DURATION: 78 MS
OHS QTC CALCULATION: 437 MS

## 2024-10-14 NOTE — TELEPHONE ENCOUNTER
Contacted pt and informed her that her aldosterone level is elevated. A referral for Endocrinology was put in and they will contact her to schedule an appt. Pt michelle w/o q/c    ----- Message from Nahomy Tejada NP sent at 10/14/2024  3:12 PM CDT -----  Your aldosterone level is elevated. I will put in a referral to endocrinology.

## 2024-10-15 ENCOUNTER — PATIENT OUTREACH (OUTPATIENT)
Dept: ADMINISTRATIVE | Facility: OTHER | Age: 54
End: 2024-10-15

## 2024-10-15 ENCOUNTER — TELEPHONE (OUTPATIENT)
Dept: PSYCHIATRY | Facility: CLINIC | Age: 54
End: 2024-10-15
Payer: MEDICAID

## 2024-10-15 NOTE — TELEPHONE ENCOUNTER
----- Message from Jana sent at 10/15/2024  1:59 PM CDT -----  States she would like to change her appt to a virtual appt. States transportation never picks her up. Please call pt [phone]. Thank you

## 2024-10-15 NOTE — PROGRESS NOTES
CHW - Initial Contact    This LPN completed OR updated the Social Determinant of Health questionnaire with patient via telephone today.  10/15/24  Pt identified barriers of most importance are: Transportation,Food,Utilities,Rent assistance.   Referrals to community agencies completed with patient/caregiver consent outside of [facility] include:  Utility, Rent,Transportation  Referrals were put through [facility] - no:   Support and Services:   Other information discussed the patient needs / wants help with: As stated above.   Follow up required:   Follow-up Outreach - Due: 10/23/2024      LPN spoke to patient/caregiver as per [facility] referral for: SDOH issues.    Does the patient consent to completing the assessment/enrollment: Yes  Does the patient consent for LPN to speak to a caregiver? No    Health Insurance Coverage:     Does the patient have adequate health insurance coverage? Yes  Education provided: No    PCP Follow-Up Appointments:    Does the patient have a primary care provider? yes - Dr Robe Britton  Date of last PCP appointment? 09/09/24  Next PCP appointment: N/A  Was patient provided with education surrounding PCP services/creating a medical home? yes - Benefits of an established PCP      Specialist Appointments:     Does the patient have a pending specialist referral? no  Does the patient have an upcoming specialist appointment? yes - 10/16/24 Psych,10/17/24 Pain Medicine,10/17/24 Rheumatology  11/06/24 Diabetic Clinic,11/21/24 Pulmonology  Is the patient pregnant? No  Does the patient have a mental health provider? yes - Mariam Young PhD       Home Medications:     Reviewed medication list with patient? No  Is the patient able to afford their medications? Yes    Care Gaps:     Does the patient have any care gaps that are due? YES         Recent lab results:  Blood Sugar:    Provided education: Yes  Blood Pressure:   Provided education: Yes        Social Determinants of Health (SDOH)    Patient's identified  areas of need: Food Insecurities,Utilities assistance,Transportation,Rent     Education/Resources provided:    Information on applying for Utilities and Rent assistance and Transportation (Para-transit) [employer]       Home Health/DME:    Current Home Health: No  Patient has all healthcare equipment/supplies indicated: yes  Current DME:  [employer]    Additional Documentation:   Called and spoke with patient regarding OPCM referral to assist with SDOH issues. Food insecurities,she has SNAP benefits but only 28 dollars per month  and her account is locked right now. Says she has attempted to correct the problem but is unable to talk to anyone to fix the problem. Has used transportation provided by Medicaid but is unhappy with the service. States the car smells weed and they drive reckless making her uncomfortable. Information on para-transit will be mail so you can  apply for services pending approval. Would like assistance with her utility bill and rent bill. Will gather resources for assistance and mail them to home address confirmed.      Follow up:   Patient agrees to scheduled follow up call. Yes  10/23/24

## 2024-10-16 ENCOUNTER — CLINICAL SUPPORT (OUTPATIENT)
Dept: DIABETES | Facility: CLINIC | Age: 54
End: 2024-10-16
Payer: MEDICAID

## 2024-10-16 ENCOUNTER — TELEPHONE (OUTPATIENT)
Dept: DIABETES | Facility: CLINIC | Age: 54
End: 2024-10-16
Payer: MEDICAID

## 2024-10-16 ENCOUNTER — OFFICE VISIT (OUTPATIENT)
Dept: PSYCHIATRY | Facility: CLINIC | Age: 54
End: 2024-10-16
Payer: MEDICAID

## 2024-10-16 VITALS — BODY MASS INDEX: 46.16 KG/M2 | WEIGHT: 205.94 LBS

## 2024-10-16 DIAGNOSIS — E11.65 TYPE 2 DIABETES MELLITUS WITH HYPERGLYCEMIA, WITH LONG-TERM CURRENT USE OF INSULIN: Primary | ICD-10-CM

## 2024-10-16 DIAGNOSIS — Z79.4 TYPE 2 DIABETES MELLITUS WITH HYPERGLYCEMIA, WITH LONG-TERM CURRENT USE OF INSULIN: Primary | ICD-10-CM

## 2024-10-16 DIAGNOSIS — F51.05 INSOMNIA DUE TO OTHER MENTAL DISORDER: ICD-10-CM

## 2024-10-16 DIAGNOSIS — F99 INSOMNIA DUE TO OTHER MENTAL DISORDER: ICD-10-CM

## 2024-10-16 DIAGNOSIS — F41.1 GENERALIZED ANXIETY DISORDER: Chronic | ICD-10-CM

## 2024-10-16 DIAGNOSIS — F25.0 SCHIZOAFFECTIVE DISORDER, BIPOLAR TYPE: Primary | Chronic | ICD-10-CM

## 2024-10-16 PROCEDURE — G0108 DIAB MANAGE TRN  PER INDIV: HCPCS | Mod: PBBFAC | Performed by: DIETITIAN, REGISTERED

## 2024-10-16 PROCEDURE — 99999 PR PBB SHADOW E&M-EST. PATIENT-LVL IV: CPT | Mod: PBBFAC,,, | Performed by: DIETITIAN, REGISTERED

## 2024-10-16 PROCEDURE — 99214 OFFICE O/P EST MOD 30 MIN: CPT | Mod: PBBFAC,27 | Performed by: DIETITIAN, REGISTERED

## 2024-10-16 PROCEDURE — 99212 OFFICE O/P EST SF 10 MIN: CPT | Mod: PBBFAC | Performed by: PSYCHOLOGIST

## 2024-10-16 PROCEDURE — 99999PBSHW PR PBB SHADOW TECHNICAL ONLY FILED TO HB: Mod: PBBFAC,,,

## 2024-10-16 PROCEDURE — 99999 PR PBB SHADOW E&M-EST. PATIENT-LVL II: CPT | Mod: PBBFAC,HB,, | Performed by: PSYCHOLOGIST

## 2024-10-16 RX ORDER — LUMATEPERONE 21 MG/1
21 CAPSULE ORAL DAILY
Qty: 30 CAPSULE | Refills: 2 | Status: SHIPPED | OUTPATIENT
Start: 2024-10-16 | End: 2025-01-14

## 2024-10-16 RX ORDER — ZOLPIDEM TARTRATE 5 MG/1
5 TABLET ORAL NIGHTLY PRN
Qty: 30 TABLET | Refills: 2 | Status: SHIPPED | OUTPATIENT
Start: 2024-10-16 | End: 2025-01-14

## 2024-10-16 NOTE — PROGRESS NOTES
"Outpatient Psychiatry Follow-Up Visit    10/16/2024      Chief Complaint:  Yolanda Betts is a 54 y.o. female who presents today for follow-up of mood and anxiety.       LAST VISIT: Yolanda attended her virtual visit, though we had initial technical problems. She reported having worsening memory problems. She said that she is not remembering things--she forgets what she is doing in the middle of things. She said that it's happening more often.    She has an upcoming nuclear stress test--has had higher anxiety. She does think that she is sleeping better at night and is not sleeping as much during the day. Though she is not sleeping, she does not feel like she has a lot of energy. In addition to cardiology, she has upcoming appt with pulmonology because she has been having trouble with her lungs/breathing. She said that she is also "shaking really bad. My hands are trembling; my mouth and my lips." She described it as "trembling" (not observed this visit); her hands are reportedly constantly moving "like I'm having little tremors or something." It started about 6 months ago but "it's just gotten really really noticeable."     She is "not telling people off. I'm trying to breathe and say what I have to say and not get offended by everything they say." Her interactions have been better. She was noted to laugh during this visit--have not heard her laugh in months.  She does feel better over all emotionally, though as would be expected, her anxiety is higher because of the upcoming medical tests.  We agreed to leave her medicines as currently prescribed and put in a referral for a neuropsych consult for her memory.  We will schedule her next visit with me for in-person so that we can further review the trembling that she reported during this visit.    Plan--continue Lamictal 200 mg bid; Xanax 1 mg bid; Caplyta 10.5 mg; Ambien 5 mg hs; takes Cymbalta 60 mg bid from another provider; referral to neuropsych for consult on " memory    CURRENT PRESENTATION: Yolanda attended her visit. She reported that she has had more anxiety--about her medical condition. She said that her diabetes II is now diabetes I--her blood sugar has been high. She said that she had Zhou's today because she was out. She has not seen her uncle recently--he has stage 1 lung cancer and is on chemo and radiation. He is in a nursing home--she can't chance getting sick. He had been with her, and she was having to do everything for him. He calls her for money--others reportedly sneak in cigarettes and alcohol for him. Her kids have visited her some--sometimes cook for her. Her daughters reportedly recently bought a house together.     Yolanda was nauseated during this visit--thought she would vomit at one point. She said that she had not eaten anything yet today (it was after 2).     We agreed to try a higher dose of her Caplyta--previously, we started at 42 mg and that was too sedating for her. We agreed to try 21 mg and will monitor. She also reported continuing memory difficulties.     Plan--continue Lamictal 200 mg bid; Xanax 1 mg bid; Ambien 5 mg hs; takes Cymbalta 60 mg bid from another provider; increase Caplyta 21 mg; STeP referral     since August 2024.          Therapist: Feroz Stanley LCSW with [facility]   Ph: [phone]  Fax: [phone]     Prior medicines: Prozac, Wellbutrin, Paxil, Lamictal, Lexapro, Celexa, Cymbalta, Remeron, Abilify, Seroquel (raised blood sugars), Risperdal, Latuda, Vraylar, Restoril, Ambien (groggy), Ambien CR, trazodone, doxepin, Xanax, clonazepam  ________________________________________________________________    PSYCHOTHERAPY      Site: [facility]  Time: 16 minutes  Participants: Met with patient    Therapeutic Intervention Type: behavior modifying psychotherapy, supportive psychotherapy  Why chosen therapy is appropriate versus another modality: patient responds to this modality,  evidence based practice    Target symptoms: anxiety , mood disorder, medical concern  Primary focus: see above    Outcome monitoring methods: self-report, observation    Patient's response to intervention:  The patient's response to intervention is accepting.    Progress toward goals:  The patient's progress toward goals is good.        8/30/2024     9:55 AM 6/12/2024     9:45 AM 6/3/2024     8:12 AM   GAD7   1. Feeling nervous, anxious, or on edge? 3  3  3    2. Not being able to stop or control worrying? 3  3  3    3. Worrying too much about different things? 3  3  3    4. Trouble relaxing? 3  3  3    5. Being so restless that it is hard to sit still? 3  3  3    6. Becoming easily annoyed or irritable? 3  3  3    7. Feeling afraid as if something awful might happen? 3  3  3    8. If you checked off any problems, how difficult have these problems made it for you to do your work, take care of things at home, or get along with other people? 3      JOANN-7 Score 21 21 21       Patient-reported      0-4 = Minimal anxiety  5-9 = Mild anxiety  10-14 = Moderate anxiety  15-21 = Severe anxiety       Review of Systems   PSYCHIATRIC: Pertinant items are noted in the narrative.    Past Medical, Family and Social History: The patient's past medical, family and social history have been reviewed and updated as appropriate within the electronic medical record - see encounter notes.      Current Outpatient Medications:     albuterol (PROVENTIL) 2.5 mg /3 mL (0.083 %) nebulizer solution, Take 2.5 mg by nebulization every 4 (four) hours as needed. Times a day, Disp: 75 mL, Rfl: 1    albuterol (PROVENTIL/VENTOLIN HFA) 90 mcg/actuation inhaler, Inhale 2 puffs into the lungs every 6 (six) hours as needed for Wheezing., Disp: 8.5 g, Rfl: 1    ALPRAZolam (XANAX) 1 MG tablet, Take 1 tablet (1 mg total) by mouth 2 (two) times daily as needed for Anxiety., Disp: 60 tablet, Rfl: 2    amlodipine-valsartan (EXFORGE)  mg per tablet, Take 1  "tablet by mouth once daily., Disp: 90 tablet, Rfl: 1    atorvastatin (LIPITOR) 20 MG tablet, Take 1 tablet (20 mg total) by mouth every evening, Disp: 90 tablet, Rfl: 3    azelastine (ASTELIN) 137 mcg (0.1 %) nasal spray, 2 sprays (274 mcg total) by Nasal route 2 (two) times daily., Disp: 30 mL, Rfl: 0    BD INSULIN SYRINGE ULTRA-FINE 1/2 mL 30 gauge x 1/2" Syrg, , Disp: , Rfl: 0    blood-glucose meter,continuous (DEXCOM G7 ) Misc, 1 each by Misc.(Non-Drug; Combo Route) route once. for 1 dose, Disp: 1 each, Rfl: 0    blood-glucose sensor (DEXCOM G6 SENSOR) Mariela, change sensor every 10 days., Disp: 3 each, Rfl: 11    blood-glucose sensor (DEXCOM G7 SENSOR) Mariela, Use to check bg. Change every 10 days, Disp: 3 each, Rfl: 11    blood-glucose transmitter (DEXCOM G6 TRANSMITTER) Mariela, 1 each by Misc.(Non-Drug; Combo Route) route every 3 (three) months., Disp: 1 each, Rfl: 3    blood-glucose transmitter (DEXCOM G6 TRANSMITTER) Mariela, Use as directed change every 3 months, Disp: 1 each, Rfl: 3    celecoxib (CELEBREX) 100 MG capsule, Take 1 capsule (100 mg total) by mouth 2 (two) times daily., Disp: 60 capsule, Rfl: 2    cevimeline (EVOXAC) 30 mg capsule, Take 1 capsule by mouth in the morning and 1 capsule at noon and 1 capsule before bedtime. Do all this for 30 days., Disp: 90 capsule, Rfl: 0    cloNIDine (CATAPRES) 0.1 MG tablet, Take 1 tablet (0.1 mg total) by mouth 2 (two) times daily., Disp: 60 tablet, Rfl: 11    cyclobenzaprine (FLEXERIL) 10 MG tablet, Take 1 tablet (10 mg total) by mouth 3 (three) times daily as needed (Pain)., Disp: 90 tablet, Rfl: 11    DULoxetine (CYMBALTA) 60 MG capsule, Take 1 capsule by mouth 2 times daily, Disp: 180 capsule, Rfl: 3    ergocalciferol (VITAMIN D2) 50,000 unit Cap, Take 1 capsule twice weekly for 3 weeks then weekly, Disp: 12 capsule, Rfl: 3    estradioL (ESTRACE) 0.01 % (0.1 mg/gram) vaginal cream, Place 1 g vaginally twice a week., Disp: 42.5 g, Rfl: 3    fenofibrate " "(TRICOR) 145 MG tablet, Take 1 tablet (145 mg total) by mouth once daily., Disp: 90 tablet, Rfl: 3    furosemide (LASIX) 20 MG tablet, Take 1 tablet (20 mg total) by mouth once daily., Disp: 90 tablet, Rfl: 3    glucagon (BAQSIMI) 3 mg/actuation Spry, 1 spray by Nasal route as needed (hypoglycemia). For emergency use. May repeat 1 time after 15 minutes, Disp: 2 each, Rfl: 1    glucagon (BAQSIMI) 3 mg/actuation Spry, SPRAY 1 SPRAY IN NOSTRIL AS NEEDED FOR EMERGENCY. MAY REPEAT 1 TIME AFTER 15 MINUTES, Disp: 2 each, Rfl: 1    insulin aspart U-100 (NOVOLOG U-100 INSULIN ASPART) 100 unit/mL injection, To use via insulin pump. Up to 200 units every 3 days, Disp: 20 mL, Rfl: 5    insulin aspart U-100 (NOVOLOG) 100 unit/mL injection, NovoLOG FlexPen, Disp: , Rfl:     insulin glargine U-100, Lantus, (LANTUS SOLOSTAR U-100 INSULIN) 100 unit/mL (3 mL) InPn pen, Inject up to 50 units daily in event of pump failure, Disp: 15 mL, Rfl: 3    insulin pump cart,automated,BT (OMNIPOD 5 G6 PODS, GEN 5,) Crtg, Inject into skin every 3 days as directed, Disp: 30 each, Rfl: 3    insulin syringe-needle U-100 0.3 mL 30 gauge x 5/16" Syrg, 1 each by Misc.(Non-Drug; Combo Route) route Every 3 (three) days., Disp: 100 each, Rfl: 2    lamoTRIgine (LAMICTAL) 200 MG tablet, Take 1 tablet (200 mg total) by mouth 2 (two) times daily., Disp: 60 tablet, Rfl: 2    levocetirizine (XYZAL) 5 MG tablet, Take 1 tablet (5 mg total) by mouth every evening., Disp: 30 tablet, Rfl: 11    LIDOcaine (LIDODERM) 5 %, Place 2 patches onto the skin every 12 (twelve) hours as needed (pain). Remove & Discard patch within 12 hours or as directed by MD, Disp: 60 patch, Rfl: 11    lifitegrast (XIIDRA) 5 % Dpet, Place 1 drop into both eyes 2 (two) times daily., Disp: 60 each, Rfl: 12    lumateperone (CAPLYTA) 21 mg Cap, Take 1 capsule (21 mg total) by mouth once daily., Disp: 30 capsule, Rfl: 2    magic mouthwash diphen/antac/lidoc/nysta, Swish and spit 5 mLs every 4 " "(four) hours as needed., Disp: 118 mL, Rfl: 0    magnesium hydroxide 400 mg/5 ml (MILK OF MAGNESIA) 400 mg/5 mL Susp, Take 5 mLs by mouth every 4 (four) hours as needed., Disp: , Rfl:     magnesium oxide (MAG-OX) 400 mg (241.3 mg magnesium) tablet, Take 1 tablet (400 mg total) by mouth once daily., Disp: 90 tablet, Rfl: 0    methylPREDNISolone (MEDROL DOSEPACK) 4 mg tablet, use as directed, Disp: 21 tablet, Rfl: 1    metoprolol succinate (TOPROL-XL) 50 MG 24 hr tablet, Take 1 tablet (50 mg total) by mouth every morning., Disp: 30 tablet, Rfl: 11    nebulizer and compressor (COMP-AIR NEBULIZER COMPRESSOR) Mariela, use as directed, Disp: 1 each, Rfl: 0    omeprazole (PRILOSEC) 40 MG capsule, Take 1 capsule (40 mg total) by mouth once daily., Disp: 90 capsule, Rfl: 1    OZEMPIC 2 mg/dose (8 mg/3 mL) PnIj, Inject 2 mg into the skin every 7 days., Disp: , Rfl:     pen needle, diabetic (BD ULTRA-FINE MINI PEN NEEDLE) 31 gauge x 3/16" Ndle, Use 1 a day in event of pump failure, Disp: 100 each, Rfl: 11    polyethylene glycol (GLYCOLAX) 17 gram/dose powder, Take 17 g by mouth 2 (two) times daily., Disp: 1020 g, Rfl: 11    promethazine (PHENERGAN) 25 MG tablet, Take 25 mg by mouth every 6 (six) hours as needed., Disp: , Rfl:     secukinumab (COSENTYX PEN, 2 PENS,) 150 mg/mL PnIj, Inject 300 mg into the skin every 14 (fourteen) days., Disp: 4 mL, Rfl: 3    semaglutide (OZEMPIC) 2 mg/dose (8 mg/3 mL) PnIj, Inject 2 mg into the skin every 7 days., Disp: 3 mL, Rfl: 2    spironolactone (ALDACTONE) 25 MG tablet, Take 1 tablet by mouth in the morning., Disp: 90 tablet, Rfl: 1    SYMBICORT 160-4.5 mcg/actuation HFAA, Inhale 2 puffs into the lungs in the morning and 2 puffs before bedtime. 2 puffs every 12 hours, Disp: 10.2 g, Rfl: 2    traMADoL (ULTRAM) 50 mg tablet, Take 1 tablet (50 mg total) by mouth every 6 (six) hours as needed for Pain., Disp: 12 tablet, Rfl: 0    triamcinolone acetonide 0.1% (KENALOG) 0.1 % paste, Apply coating 2 " "times daily, Disp: 5 g, Rfl: 12    zolpidem (AMBIEN) 5 MG Tab, Take 1 tablet (5 mg total) by mouth nightly as needed (anxiety)., Disp: 30 tablet, Rfl: 2  No current facility-administered medications for this visit.    Facility-Administered Medications Ordered in Other Visits:     ondansetron injection 4 mg, 4 mg, Intravenous, Once PRN, Otto Phillips MD    Compliance: yes    Side effects: see above    Risk Parameters:  Patient reports no suicidal ideation  Patient reports no homicidal ideation  Patient reports no self-injurious behavior  Patient reports no violent behavior    Exam (detailed: at least 9 elements; comprehensive: all 15 elements)   Constitutional    Wt Readings from Last 10 Encounters:   10/16/24 93.4 kg (205 lb 14.6 oz)   10/12/24 92.5 kg (204 lb)   10/11/24 92.5 kg (204 lb)   10/10/24 92.7 kg (204 lb 5.9 oz)   09/27/24 93.4 kg (206 lb)   09/17/24 93.6 kg (206 lb 5.6 oz)   09/12/24 92.6 kg (204 lb 2.3 oz)   09/12/24 92.6 kg (204 lb 2.3 oz)   09/04/24 91.6 kg (201 lb 15.1 oz)   09/03/24 88.5 kg (195 lb)     Vitals:  Most recent vital signs were reviewed.   Last 3 sets of Vitals        10/11/2024     4:49 PM 10/12/2024     6:55 PM 10/16/2024     3:13 PM   Vitals - 1 value per visit   SYSTOLIC 159 130    DIASTOLIC 80 86    Pulse 114 112    Temp 98.7 °F (37.1 °C) 99.4 °F (37.4 °C)    Resp 18 18    SPO2 99 % 100 %    Weight (lb) 204 204 205.91   Weight (kg) 92.534 92.534 93.4   Height 4' 8" (1.422 m) 4' 8" (1.422 m)    BMI (Calculated) 45.8 45.8           General:  age appropriate, casually dressed, neatly groomed, hair down     Musculoskeletal  Muscle Strength/Tone:  no tremor, no tic, none observed   Gait & Station:  non-ataxic     Psychiatric  Speech:  no latency; no press, soft   Behavior: wnl   Mood & Affect:  anxious, about medical, nauseated  congruent and appropriate   Thought Process:  normal and logical   Associations:  intact   Thought Content:  normal, no suicidality, no homicidality, " delusions, or paranoia, reported that she still has some hallucinations--does not bother her (?)   Insight:  has awareness of illness   Judgement: behavior is adequate to circumstances   Orientation:  grossly intact   Memory: intact for content of interview   Language: grossly intact   Attention Span & Concentration:  Grossly intact   Fund of Knowledge:  intact and appropriate to age and level of education     Assessment and Diagnosis   Status/Progress: Based on the examination today, the patient's problem(s) is/are improved and adequately but not ideally controlled.  New problems have been presented today.   Co-morbidities and psychosocial stressors  are complicating management of the primary condition.  The working differential for this patient includes schizoaffective vs bipolar vs schizophrenia.     General Impression:     Encounter Diagnoses   Name Primary?    Schizoaffective disorder, bipolar type Yes    Generalized anxiety disorder     Insomnia due to other mental disorder        Intervention/Counseling/Treatment Plan   Medication Management: Discussed risks, benefits, and alternatives to treatment plan documented above with patient. I answered all patient questions related to this plan, and patient expressed understanding and agreement.   continue Lamictal 200 mg bid; Xanax 1 mg bid; Ambien 5 mg hs; takes Cymbalta 60 mg bid from another provider; increase Caplyta 21 mg  STeP referral for therapy    Exercise/movement      Medication List with Changes/Refills   New Medications    LUMATEPERONE (CAPLYTA) 21 MG CAP    Take 1 capsule (21 mg total) by mouth once daily.   Current Medications    ALBUTEROL (PROVENTIL) 2.5 MG /3 ML (0.083 %) NEBULIZER SOLUTION    Take 2.5 mg by nebulization every 4 (four) hours as needed. Times a day    ALBUTEROL (PROVENTIL/VENTOLIN HFA) 90 MCG/ACTUATION INHALER    Inhale 2 puffs into the lungs every 6 (six) hours as needed for Wheezing.    ALPRAZOLAM (XANAX) 1 MG TABLET    Take 1  "tablet (1 mg total) by mouth 2 (two) times daily as needed for Anxiety.    AMLODIPINE-VALSARTAN (EXFORGE)  MG PER TABLET    Take 1 tablet by mouth once daily.    ATORVASTATIN (LIPITOR) 20 MG TABLET    Take 1 tablet (20 mg total) by mouth every evening    AZELASTINE (ASTELIN) 137 MCG (0.1 %) NASAL SPRAY    2 sprays (274 mcg total) by Nasal route 2 (two) times daily.    BD INSULIN SYRINGE ULTRA-FINE 1/2 ML 30 GAUGE X 1/2" SYRG        BLOOD-GLUCOSE METER,CONTINUOUS (DEXCOM G7 ) MISC    1 each by Misc.(Non-Drug; Combo Route) route once. for 1 dose    BLOOD-GLUCOSE SENSOR (DEXCOM G6 SENSOR) NHI    change sensor every 10 days.    BLOOD-GLUCOSE SENSOR (DEXCOM G7 SENSOR) NHI    Use to check bg. Change every 10 days    BLOOD-GLUCOSE TRANSMITTER (DEXCOM G6 TRANSMITTER) NHI    1 each by Misc.(Non-Drug; Combo Route) route every 3 (three) months.    BLOOD-GLUCOSE TRANSMITTER (DEXCOM G6 TRANSMITTER) NHI    Use as directed change every 3 months    CELECOXIB (CELEBREX) 100 MG CAPSULE    Take 1 capsule (100 mg total) by mouth 2 (two) times daily.    CEVIMELINE (EVOXAC) 30 MG CAPSULE    Take 1 capsule by mouth in the morning and 1 capsule at noon and 1 capsule before bedtime. Do all this for 30 days.    CLONIDINE (CATAPRES) 0.1 MG TABLET    Take 1 tablet (0.1 mg total) by mouth 2 (two) times daily.    CYCLOBENZAPRINE (FLEXERIL) 10 MG TABLET    Take 1 tablet (10 mg total) by mouth 3 (three) times daily as needed (Pain).    DULOXETINE (CYMBALTA) 60 MG CAPSULE    Take 1 capsule by mouth 2 times daily    ERGOCALCIFEROL (VITAMIN D2) 50,000 UNIT CAP    Take 1 capsule twice weekly for 3 weeks then weekly    ESTRADIOL (ESTRACE) 0.01 % (0.1 MG/GRAM) VAGINAL CREAM    Place 1 g vaginally twice a week.    FENOFIBRATE (TRICOR) 145 MG TABLET    Take 1 tablet (145 mg total) by mouth once daily.    FUROSEMIDE (LASIX) 20 MG TABLET    Take 1 tablet (20 mg total) by mouth once daily.    GLUCAGON (BAQSIMI) 3 MG/ACTUATION SPRY    1 " "spray by Nasal route as needed (hypoglycemia). For emergency use. May repeat 1 time after 15 minutes    GLUCAGON (BAQSIMI) 3 MG/ACTUATION SPRY    SPRAY 1 SPRAY IN NOSTRIL AS NEEDED FOR EMERGENCY. MAY REPEAT 1 TIME AFTER 15 MINUTES    INSULIN ASPART U-100 (NOVOLOG U-100 INSULIN ASPART) 100 UNIT/ML INJECTION    To use via insulin pump. Up to 200 units every 3 days    INSULIN ASPART U-100 (NOVOLOG) 100 UNIT/ML INJECTION    NovoLOG FlexPen    INSULIN GLARGINE U-100, LANTUS, (LANTUS SOLOSTAR U-100 INSULIN) 100 UNIT/ML (3 ML) INPN PEN    Inject up to 50 units daily in event of pump failure    INSULIN PUMP CART,AUTOMATED,BT (OMNIPOD 5 G6 PODS, GEN 5,) CRTG    Inject into skin every 3 days as directed    INSULIN SYRINGE-NEEDLE U-100 0.3 ML 30 GAUGE X 5/16" SYRG    1 each by Misc.(Non-Drug; Combo Route) route Every 3 (three) days.    LAMOTRIGINE (LAMICTAL) 200 MG TABLET    Take 1 tablet (200 mg total) by mouth 2 (two) times daily.    LEVOCETIRIZINE (XYZAL) 5 MG TABLET    Take 1 tablet (5 mg total) by mouth every evening.    LIDOCAINE (LIDODERM) 5 %    Place 2 patches onto the skin every 12 (twelve) hours as needed (pain). Remove & Discard patch within 12 hours or as directed by MD    LIFITEGRAST (XIIDRA) 5 % DPET    Place 1 drop into both eyes 2 (two) times daily.    MAGIC MOUTHWASH DIPHEN/ANTAC/LIDOC/NYSTA    Swish and spit 5 mLs every 4 (four) hours as needed.    MAGNESIUM HYDROXIDE 400 MG/5 ML (MILK OF MAGNESIA) 400 MG/5 ML SUSP    Take 5 mLs by mouth every 4 (four) hours as needed.    MAGNESIUM OXIDE (MAG-OX) 400 MG (241.3 MG MAGNESIUM) TABLET    Take 1 tablet (400 mg total) by mouth once daily.    METHYLPREDNISOLONE (MEDROL DOSEPACK) 4 MG TABLET    use as directed    METOPROLOL SUCCINATE (TOPROL-XL) 50 MG 24 HR TABLET    Take 1 tablet (50 mg total) by mouth every morning.    NEBULIZER AND COMPRESSOR (COMP-AIR NEBULIZER COMPRESSOR) NHI    use as directed    OMEPRAZOLE (PRILOSEC) 40 MG CAPSULE    Take 1 capsule (40 mg " "total) by mouth once daily.    OZEMPIC 2 MG/DOSE (8 MG/3 ML) PNIJ    Inject 2 mg into the skin every 7 days.    PEN NEEDLE, DIABETIC (BD ULTRA-FINE MINI PEN NEEDLE) 31 GAUGE X 3/16" NDLE    Use 1 a day in event of pump failure    POLYETHYLENE GLYCOL (GLYCOLAX) 17 GRAM/DOSE POWDER    Take 17 g by mouth 2 (two) times daily.    PROMETHAZINE (PHENERGAN) 25 MG TABLET    Take 25 mg by mouth every 6 (six) hours as needed.    SECUKINUMAB (COSENTYX PEN, 2 PENS,) 150 MG/ML PNIJ    Inject 300 mg into the skin every 14 (fourteen) days.    SEMAGLUTIDE (OZEMPIC) 2 MG/DOSE (8 MG/3 ML) PNIJ    Inject 2 mg into the skin every 7 days.    SPIRONOLACTONE (ALDACTONE) 25 MG TABLET    Take 1 tablet by mouth in the morning.    SYMBICORT 160-4.5 MCG/ACTUATION HFAA    Inhale 2 puffs into the lungs in the morning and 2 puffs before bedtime. 2 puffs every 12 hours    TRAMADOL (ULTRAM) 50 MG TABLET    Take 1 tablet (50 mg total) by mouth every 6 (six) hours as needed for Pain.    TRIAMCINOLONE ACETONIDE 0.1% (KENALOG) 0.1 % PASTE    Apply coating 2 times daily   Changed and/or Refilled Medications    Modified Medication Previous Medication    ZOLPIDEM (AMBIEN) 5 MG TAB zolpidem (AMBIEN) 5 MG Tab       Take 1 tablet (5 mg total) by mouth nightly as needed (anxiety).    Take 1 tablet (5 mg total) by mouth nightly as needed (anxiety).   Discontinued Medications    LUMATEPERONE (CAPLYTA) 10.5 MG CAP    Take 1 capsule (10.5 mg total) by mouth once daily.        Return to Clinic: 6 weeks, in clinic    I spent an additional 17 minutes performing E/M services with >50% spent on counseling, guidance, coordinating care (not Psychotherapy related) in addition to the 16 minutes performing Psychotherapy.    This includes face to face time and non-face to face time preparing to see the patient (eg, review of tests), obtaining and/or reviewing separately obtained history, documenting clinical information in the electronic or other health record, independently " interpreting results and communicating results to the patient/family/caregiver, or care coordinator.    Time spent with pt including note preparation: 33 minutes       Mariam Young, PhD, MP  Advanced Practice Medical Psychologist  [facility]--[address]  [place] [address]  [phone]   [phone] fax

## 2024-10-16 NOTE — TELEPHONE ENCOUNTER
----- Message from Jana sent at 10/15/2024  2:02 PM CDT -----  States she would like to change her appt to a virtual appt. States transportation never picks her up. Please call pt [phone]. Thank you

## 2024-10-16 NOTE — TELEPHONE ENCOUNTER
Spoke with pt. She reports confusion on appt days and will be able to make in-person visit today.

## 2024-10-16 NOTE — PATIENT INSTRUCTIONS

## 2024-10-17 ENCOUNTER — OFFICE VISIT (OUTPATIENT)
Dept: PAIN MEDICINE | Facility: CLINIC | Age: 54
End: 2024-10-17
Payer: MEDICAID

## 2024-10-17 ENCOUNTER — LAB VISIT (OUTPATIENT)
Dept: LAB | Facility: HOSPITAL | Age: 54
End: 2024-10-17
Attending: INTERNAL MEDICINE
Payer: MEDICAID

## 2024-10-17 ENCOUNTER — OFFICE VISIT (OUTPATIENT)
Dept: RHEUMATOLOGY | Facility: CLINIC | Age: 54
End: 2024-10-17
Payer: MEDICAID

## 2024-10-17 ENCOUNTER — TELEPHONE (OUTPATIENT)
Dept: CARDIOLOGY | Facility: CLINIC | Age: 54
End: 2024-10-17
Payer: MEDICAID

## 2024-10-17 VITALS
SYSTOLIC BLOOD PRESSURE: 139 MMHG | WEIGHT: 204.81 LBS | DIASTOLIC BLOOD PRESSURE: 85 MMHG | HEART RATE: 87 BPM | BODY MASS INDEX: 46.07 KG/M2 | RESPIRATION RATE: 17 BRPM | HEIGHT: 56 IN

## 2024-10-17 VITALS
WEIGHT: 206.56 LBS | HEART RATE: 89 BPM | HEIGHT: 56 IN | SYSTOLIC BLOOD PRESSURE: 128 MMHG | DIASTOLIC BLOOD PRESSURE: 88 MMHG | BODY MASS INDEX: 46.46 KG/M2

## 2024-10-17 DIAGNOSIS — K76.0 FATTY LIVER: ICD-10-CM

## 2024-10-17 DIAGNOSIS — Z51.81 ENCOUNTER FOR MEDICATION MONITORING: ICD-10-CM

## 2024-10-17 DIAGNOSIS — M54.16 LUMBAR RADICULOPATHY: ICD-10-CM

## 2024-10-17 DIAGNOSIS — M45.9 ANKYLOSING SPONDYLITIS, UNSPECIFIED SITE OF SPINE: ICD-10-CM

## 2024-10-17 DIAGNOSIS — M79.18 CHRONIC MYOFASCIAL PAIN: ICD-10-CM

## 2024-10-17 DIAGNOSIS — G89.29 CHRONIC MYOFASCIAL PAIN: ICD-10-CM

## 2024-10-17 DIAGNOSIS — D84.821 DRUG-INDUCED IMMUNODEFICIENCY: ICD-10-CM

## 2024-10-17 DIAGNOSIS — M53.3 SCLEROSIS OF SACROILIAC JOINT: ICD-10-CM

## 2024-10-17 DIAGNOSIS — M45.9 ANKYLOSING SPONDYLITIS, UNSPECIFIED SITE OF SPINE: Primary | ICD-10-CM

## 2024-10-17 DIAGNOSIS — M54.9 DORSALGIA, UNSPECIFIED: Primary | ICD-10-CM

## 2024-10-17 DIAGNOSIS — M47.812 CERVICAL SPONDYLOSIS: ICD-10-CM

## 2024-10-17 DIAGNOSIS — M54.12 CERVICAL RADICULOPATHY: ICD-10-CM

## 2024-10-17 DIAGNOSIS — M79.7 FIBROMYALGIA: Chronic | ICD-10-CM

## 2024-10-17 DIAGNOSIS — Z79.899 DRUG-INDUCED IMMUNODEFICIENCY: ICD-10-CM

## 2024-10-17 PROCEDURE — 3074F SYST BP LT 130 MM HG: CPT | Mod: CPTII,,, | Performed by: INTERNAL MEDICINE

## 2024-10-17 PROCEDURE — 3044F HG A1C LEVEL LT 7.0%: CPT | Mod: CPTII,,, | Performed by: INTERNAL MEDICINE

## 2024-10-17 PROCEDURE — 82103 ALPHA-1-ANTITRYPSIN TOTAL: CPT | Performed by: INTERNAL MEDICINE

## 2024-10-17 PROCEDURE — 86015 ACTIN ANTIBODY EACH: CPT | Performed by: INTERNAL MEDICINE

## 2024-10-17 PROCEDURE — 3044F HG A1C LEVEL LT 7.0%: CPT | Mod: CPTII,,, | Performed by: NURSE PRACTITIONER

## 2024-10-17 PROCEDURE — 3079F DIAST BP 80-89 MM HG: CPT | Mod: CPTII,,, | Performed by: NURSE PRACTITIONER

## 2024-10-17 PROCEDURE — 4010F ACE/ARB THERAPY RXD/TAKEN: CPT | Mod: CPTII,,, | Performed by: INTERNAL MEDICINE

## 2024-10-17 PROCEDURE — 99999 PR PBB SHADOW E&M-EST. PATIENT-LVL IV: CPT | Mod: PBBFAC,,, | Performed by: NURSE PRACTITIONER

## 2024-10-17 PROCEDURE — 86381 MITOCHONDRIAL ANTIBODY EACH: CPT | Performed by: INTERNAL MEDICINE

## 2024-10-17 PROCEDURE — 82390 ASSAY OF CERULOPLASMIN: CPT | Performed by: INTERNAL MEDICINE

## 2024-10-17 PROCEDURE — 99214 OFFICE O/P EST MOD 30 MIN: CPT | Mod: PBBFAC | Performed by: NURSE PRACTITIONER

## 2024-10-17 PROCEDURE — 3079F DIAST BP 80-89 MM HG: CPT | Mod: CPTII,,, | Performed by: INTERNAL MEDICINE

## 2024-10-17 PROCEDURE — 86803 HEPATITIS C AB TEST: CPT | Performed by: INTERNAL MEDICINE

## 2024-10-17 PROCEDURE — 3075F SYST BP GE 130 - 139MM HG: CPT | Mod: CPTII,,, | Performed by: NURSE PRACTITIONER

## 2024-10-17 PROCEDURE — 87340 HEPATITIS B SURFACE AG IA: CPT | Performed by: INTERNAL MEDICINE

## 2024-10-17 PROCEDURE — G2211 COMPLEX E/M VISIT ADD ON: HCPCS | Mod: S$PBB,,, | Performed by: INTERNAL MEDICINE

## 2024-10-17 PROCEDURE — 1160F RVW MEDS BY RX/DR IN RCRD: CPT | Mod: CPTII,,, | Performed by: INTERNAL MEDICINE

## 2024-10-17 PROCEDURE — 1159F MED LIST DOCD IN RCRD: CPT | Mod: CPTII,,, | Performed by: INTERNAL MEDICINE

## 2024-10-17 PROCEDURE — 86364 TISS TRNSGLTMNASE EA IG CLAS: CPT | Performed by: INTERNAL MEDICINE

## 2024-10-17 PROCEDURE — 4010F ACE/ARB THERAPY RXD/TAKEN: CPT | Mod: CPTII,,, | Performed by: NURSE PRACTITIONER

## 2024-10-17 PROCEDURE — 84466 ASSAY OF TRANSFERRIN: CPT | Performed by: INTERNAL MEDICINE

## 2024-10-17 PROCEDURE — 3008F BODY MASS INDEX DOCD: CPT | Mod: CPTII,,, | Performed by: NURSE PRACTITIONER

## 2024-10-17 PROCEDURE — 99999 PR PBB SHADOW E&M-EST. PATIENT-LVL V: CPT | Mod: PBBFAC,,, | Performed by: INTERNAL MEDICINE

## 2024-10-17 PROCEDURE — 3008F BODY MASS INDEX DOCD: CPT | Mod: CPTII,,, | Performed by: INTERNAL MEDICINE

## 2024-10-17 PROCEDURE — 99215 OFFICE O/P EST HI 40 MIN: CPT | Mod: S$PBB,,, | Performed by: INTERNAL MEDICINE

## 2024-10-17 PROCEDURE — 99215 OFFICE O/P EST HI 40 MIN: CPT | Mod: PBBFAC,27 | Performed by: INTERNAL MEDICINE

## 2024-10-17 PROCEDURE — 99213 OFFICE O/P EST LOW 20 MIN: CPT | Mod: S$PBB,,, | Performed by: NURSE PRACTITIONER

## 2024-10-17 PROCEDURE — 80321 ALCOHOLS BIOMARKERS 1OR 2: CPT | Performed by: INTERNAL MEDICINE

## 2024-10-17 PROCEDURE — 36415 COLL VENOUS BLD VENIPUNCTURE: CPT | Performed by: INTERNAL MEDICINE

## 2024-10-17 PROCEDURE — 86038 ANTINUCLEAR ANTIBODIES: CPT | Performed by: INTERNAL MEDICINE

## 2024-10-17 PROCEDURE — 82784 ASSAY IGA/IGD/IGG/IGM EACH: CPT | Performed by: INTERNAL MEDICINE

## 2024-10-17 PROCEDURE — 1159F MED LIST DOCD IN RCRD: CPT | Mod: CPTII,,, | Performed by: NURSE PRACTITIONER

## 2024-10-17 RX ORDER — DIAZEPAM 5 MG/1
5 TABLET ORAL ONCE
Qty: 1 TABLET | Refills: 0 | Status: SHIPPED | OUTPATIENT
Start: 2024-10-17 | End: 2024-10-18

## 2024-10-17 RX ORDER — SECUKINUMAB 150 MG/ML
300 INJECTION SUBCUTANEOUS
Qty: 4 ML | Refills: 11 | Status: ACTIVE | OUTPATIENT
Start: 2024-10-17

## 2024-10-17 RX ORDER — DULOXETIN HYDROCHLORIDE 60 MG/1
CAPSULE, DELAYED RELEASE ORAL
Qty: 180 CAPSULE | Refills: 3 | Status: SHIPPED | OUTPATIENT
Start: 2024-10-17

## 2024-10-17 NOTE — TELEPHONE ENCOUNTER
----- Message from Nahomy Tejada NP sent at 10/17/2024 11:54 AM CDT -----  Heart monitor with no concerning arrythmias

## 2024-10-18 ENCOUNTER — TELEPHONE (OUTPATIENT)
Dept: DIABETES | Facility: CLINIC | Age: 54
End: 2024-10-18
Payer: MEDICAID

## 2024-10-18 DIAGNOSIS — Z79.4 TYPE 2 DIABETES MELLITUS WITH HYPERGLYCEMIA, WITH LONG-TERM CURRENT USE OF INSULIN: Primary | ICD-10-CM

## 2024-10-18 DIAGNOSIS — E11.65 TYPE 2 DIABETES MELLITUS WITH HYPERGLYCEMIA, WITH LONG-TERM CURRENT USE OF INSULIN: Primary | ICD-10-CM

## 2024-10-18 LAB
A1AT SERPL-MCNC: 138 MG/DL (ref 100–190)
ANA SER QL IF: NORMAL
CERULOPLASMIN SERPL-MCNC: 33 MG/DL (ref 15–45)
HBV SURFACE AG SERPL QL IA: NORMAL
HCV AB SERPL QL IA: NORMAL
IGG SERPL-MCNC: 824 MG/DL (ref 650–1600)
IRON SERPL-MCNC: 55 UG/DL (ref 30–160)
SATURATED IRON: 10 % (ref 20–50)
TOTAL IRON BINDING CAPACITY: 551 UG/DL (ref 250–450)
TRANSFERRIN SERPL-MCNC: 372 MG/DL (ref 200–375)

## 2024-10-18 RX ORDER — INSULIN PMP CART,AUT,G6/7,CNTR
1 EACH SUBCUTANEOUS
Qty: 45 EACH | Refills: 3 | Status: SHIPPED | OUTPATIENT
Start: 2024-10-18

## 2024-10-18 NOTE — TELEPHONE ENCOUNTER
Patient is asking for increase on the insulin pump cart,automated,BT (OMNIPOD 5 G6 PODS, GEN 5,) Crtg. Patient change 2 days not 3 ways

## 2024-10-18 NOTE — TELEPHONE ENCOUNTER
----- Message from Ruchi sent at 10/18/2024  2:37 PM CDT -----  Contact: [phone]  patient  .[email or web address]     Patient is calling for Medical Advice regarding:Patient is asking for increase on the insulin pump cart,automated,BT (OMNIPOD 5 G6 PODS, GEN 5,) Crtg. Patient change 2 days not 3 ways     How long has patient had these symptoms:    Pharmacy name and phone#:    Patient wants a call back or thru [facility]:call     Comments:    Please advise patient replies from provider may take up to 48 hours.

## 2024-10-21 ENCOUNTER — TELEPHONE (OUTPATIENT)
Dept: DIABETES | Facility: CLINIC | Age: 54
End: 2024-10-21
Payer: MEDICAID

## 2024-10-21 LAB
CLINICAL BIOCHEMIST REVIEW: NORMAL
MITOCHONDRIA AB TITR SER IF: NORMAL {TITER}
PLPETH BLD-MCNC: <10 NG/ML
POPETH BLD-MCNC: <10 NG/ML
SMOOTH MUSCLE AB TITR SER IF: NORMAL {TITER}
TTG IGA SER-ACNC: 0.6 U/ML

## 2024-10-21 NOTE — TELEPHONE ENCOUNTER
Spoke with pt. She is requesting Dexcom G6 sample and will  today. Sample left for her at 2nd floor registration. Successful outreach.

## 2024-10-21 NOTE — PROGRESS NOTES
Diabetes Care Specialist Progress Note  Author: Yara Sanchez RD, CDE  Date: 10/21/2024    Intake    Program Intake  Reason for Diabetes Program Visit:: Intervention (DM referral 2/5/24 Elizabeth Ceron NP)  Type of Intervention:: Individual  Individual: Education  Education: Advanced Pump (OP5 troublesoot high BG patterns)  Device Training:  (OmniPod5 (training 2/26/24))  Current diabetes risk level:: high  In the last 12 months, have you:: used emergency room services  Was the ER or hospital admission related to diabetes?: Yes    Current Diabetes Treatment:  (Novolog via OP5 and Dex G6)    Continuous Glucose Monitoring  Personal CGM type:: Dexcom G6 - pt denies device or placement issues. GMI isn't avail.    Lab Results   Component Value Date    HGBA1C 6.6 (H) 06/27/2024       Weight: 93.4 kg (205 lb 14.6 oz)       Body mass index is 46.16 kg/m².    Lifestyle Coping Support & Clinical    Problem Review  Active Comorbidities:  (HTN, HLD, Neuropathy, VitD defn, Bariatric surgery 6/1/21, IBS- diarrhea/constipation, GERD, DAVIS, Iron defn anemia, Asthma, Mental health ds, BMI 45.82.)    Diabetes Self-Management Skills Assessment    Medication Skills Assessment  Patient is able to identify current diabetes medications, dosages, and appropriate timing of medications.: no (Pt improved with bolus accuracy and repors no longer entering fake carbs.)  Patient reports problems or concerns with current medication regimen.: yes  Medication regimen problems/concerns:: does not feel like regimen is working (Pt concerned about high BG patterns.)  Patient is  aware that some diabetes medications can cause low blood sugar?: Yes  Medication Skills Assessment Completed:: Yes  Assessment indicates:: Adequate understanding  Area of need?: Yes    Nutrition/Healthy Eating  Challenges to healthy eating:: snacking between meals and at night (Pt reports irregular meal patterns but eating more than from last visit. She reports anxiety with  "increased "junk foods" eaten. She continues to use door dash but family has come to assist with organizing her home pantry.)  Nutrition/Healthy Eating Skills Assessment Completed:: Yes  Assessment indicates:: Instruction Needed  Area of need?: Yes    Assessment Summary and Plan  Based on today's diabetes care assessment, the following areas of need were identified:          10/21/2024   Areas of Need   Nutrition/Healthy Eating Yes  - see care plan   Medication Yes - see care plan      Today's interventions were provided through individual discussion, instruction, and written materials were provided.    Patient verbalized understanding of instruction and written materials.  Pt was able to return back demonstration of instructions today. Patient understood key points, needs reinforcement and further instruction.     Diabetes Self-Management Care Plan:  Today's Diabetes Self-Management Care Plan was developed with Yolanda's input. Yolanda has agreed to work toward the following goal(s) to improve his/her overall diabetes control.      Care Plan: Diabetes Management   Updates made since 9/21/2024 12:00 AM        Problem: Medications         Goal: Patient Agrees to take Diabetes Medication(s) as prescribed.    Start Date: 2/6/2024   Expected End Date: 2/5/2025   This Visit's Progress: On track   Recent Progress: Not met   Priority: High   Barriers: Other (comments)   Note:    Encouraged continued accuracy with carb / correction bolus entry.      Encouraged upcoming follow-up with provider Abner for diabetes medical mgmt and Dr. Young for psych. No pump rate changes made today per provider.        Problem: Healthy Eating         Goal: Eat 5 small meals daily to assist carb management and adequate nutrition intake.    Start Date: 2/6/2024   Expected End Date: 2/5/2025   This Visit's Progress: No change   Recent Progress: Not met   Priority: Medium   Barriers: Other (comments); Financial   Note:    Emphasis on role of meal " spacing, carb/pro balance for adequate nutrition, BG stability.     Pt reports not yet contacting [facility] but has had her dtr assist with re-organizing her pantry.    Discussed specific tips for meals using her current foods available. Reviewed role MR shakes/entrees with brands to try. Provided online recipe resources.             Follow Up Plan   Follow up in about 2 weeks (around 10/30/2024).  -akirao report  -eval goals    Today's care plan and follow up schedule was discussed with patient.  Yolanda verbalized understanding of the care plan, goals, and agrees to follow up plan.        The patient was encouraged to communicate with his/her health care provider/physician and care team regarding his/her condition(s) and treatment.  I provided the patient with my contact information today and encouraged to contact me via phone or [facility] as needed.     Length of Visit   Total Time: 60 Minutes

## 2024-10-21 NOTE — TELEPHONE ENCOUNTER
----- Message from Magdiel Livingston sent at 10/21/2024 10:02 AM CDT -----  Contact: self     ----- Message -----  From: Livia Yeager  Sent: 10/21/2024   9:51 AM CDT  To: #    .Type: Patient Call Back        Who called:   Patient      What is the request in detail:    Called in concerning dexcom 6 sensors . Patient states that she needs sensors . Patient would like to get a call back from  Yara Sanchez, RD, CDE Please call back   Can the clinic reply by [facility]?           Would the patient rather a call back or a response via [facility]?   call      Best call back number:  [phone]

## 2024-10-23 ENCOUNTER — OFFICE VISIT (OUTPATIENT)
Dept: DIABETES | Facility: CLINIC | Age: 54
End: 2024-10-23
Payer: MEDICAID

## 2024-10-23 DIAGNOSIS — E11.65 TYPE 2 DIABETES MELLITUS WITH HYPERGLYCEMIA, WITH LONG-TERM CURRENT USE OF INSULIN: Primary | ICD-10-CM

## 2024-10-23 DIAGNOSIS — Z79.4 TYPE 2 DIABETES MELLITUS WITH HYPERGLYCEMIA, WITH LONG-TERM CURRENT USE OF INSULIN: Primary | ICD-10-CM

## 2024-10-23 PROCEDURE — 99214 OFFICE O/P EST MOD 30 MIN: CPT | Mod: 95,,, | Performed by: NURSE PRACTITIONER

## 2024-10-23 PROCEDURE — 4010F ACE/ARB THERAPY RXD/TAKEN: CPT | Mod: CPTII,95,, | Performed by: NURSE PRACTITIONER

## 2024-10-23 PROCEDURE — 3044F HG A1C LEVEL LT 7.0%: CPT | Mod: CPTII,95,, | Performed by: NURSE PRACTITIONER

## 2024-10-23 PROCEDURE — 1159F MED LIST DOCD IN RCRD: CPT | Mod: CPTII,95,, | Performed by: NURSE PRACTITIONER

## 2024-10-23 PROCEDURE — 1160F RVW MEDS BY RX/DR IN RCRD: CPT | Mod: CPTII,95,, | Performed by: NURSE PRACTITIONER

## 2024-10-23 NOTE — PROGRESS NOTES
Subjective:         Patient ID: Yolanda Betts is a 54 y.o. female.  Patient's current PCP is Robe Britton MD.       Chief Complaint: No chief complaint on file.      HPI  Yolanda Betts is a 54 y.o. Black or  female presenting for a follow up for diabetes. Patient has been diagnosed with diabetes for > 10 years.  Currently on the omnipod 5. Since the last visit- BG have improved, less manual mode.    Complications related to diabetes:  HTN, Hyperlipidemia, peripheral neuropathy; denies Pancreatitis; denies Gastroparesis; denies DKA; denies Hx/family Hx of MEN2/MTC; reports Frequent UTIs/yeast infections; Bariatric surgery 6/1/21. IBS- diarrhea/constipation    Past failed treatment include:  Jardiance- multiple yeast infections; Victoza - GI upset. GLP-1- not advised due to GI issues (per GI and DM management), Metformin- low BGs    Blood glucose testing is performed regularly with her Dexcom. Bg have been high due to stress.     Compliance:The patient reports medication and diet noncompliance (may eat too little or too much due to stress)      The patient location is: [place], [place]  The chief complaint leading to consultation is: DM follow up    Visit type: audiovisual    Face to Face time with patient: 20 minutes of total time spent on the encounter, which includes face to face time and non-face to face time preparing to see the patient (eg, review of tests), Obtaining and/or reviewing separately obtained history, Documenting clinical information in the electronic or other health record, Independently interpreting results (not separately reported) and communicating results to the patient/family/caregiver, or Care coordination (not separately reported). Each patient to whom he or she provides medical services by telemedicine is:  (1) informed of the relationship between the physician and patient and the respective role of any other health care provider with respect to management of the  patient; and (2) notified that he or she may decline to receive medical services by telemedicine and may withdraw from such care at any time.         //   , There is no height or weight on file to calculate BMI.  Her blood sugar in clinic today is:   Lab Results   Component Value Date    POCGLU 78 03/10/2023       Labs reviewed and are noted below.  Her most recent A1C is:  Lab Results   Component Value Date    HGBA1C 6.6 (H) 06/27/2024    HGBA1C 6.5 (H) 11/29/2023    HGBA1C 5.6 08/08/2023     Lab Results   Component Value Date    CPEPTIDE 4.56 02/26/2018     Lab Results   Component Value Date    GLUTAMICACID 0.00 02/26/2018     Glucose   Date Value Ref Range Status   10/12/2024 289 (H) 70 - 110 mg/dL Final     Anion Gap   Date Value Ref Range Status   10/12/2024 9 8 - 16 mmol/L Final     eGFR if    Date Value Ref Range Status   03/08/2022 >60.0 >60 mL/min/1.73 m^2 Final     eGFR if non    Date Value Ref Range Status   03/08/2022 >60.0 >60 mL/min/1.73 m^2 Final     Comment:     Calculation used to obtain the estimated glomerular filtration  rate (eGFR) is the CKD-EPI equation.          CURRENT DM MEDICATIONS:   Diabetes Medications               glucagon (BAQSIMI) 3 mg/actuation Spry 1 spray by Nasal route as needed (hypoglycemia). For emergency use. May repeat 1 time after 15 minutes    insulin aspart U-100 (NOVOLOG FLEXPEN U-100 INSULIN) 100 unit/mL (3 mL) InPn pen Inject 24 Units into the skin 3 (three) times daily before meals. Use per sliding scale for breakfast and dinner    insulin aspart U-100 (NOVOLOG U-100 INSULIN ASPART) 100 unit/mL injection Inject 100 Units into the skin Every 3 (three) days. To use via insulin pump                   Diabetes Management Status    Statin: Taking  ACE/ARB: Taking    Screening or Prevention Patient's value Goal Complete/Controlled?   HgA1C Testing and Control   Lab Results   Component Value Date    HGBA1C 6.6 (H) 06/27/2024       Annually/Less than 8% Yes   Lipid profile : 04/29/2024 Annually Yes   LDL control Lab Results   Component Value Date    LDLCALC 123 (H) 04/29/2024    Annually/Less than 100 mg/dl  Yes   Nephropathy screening Lab Results   Component Value Date    LABMICR 22.0 12/11/2023     Lab Results   Component Value Date    PROTEINUA Negative 10/12/2024    Annually Yes   Blood pressure BP Readings from Last 1 Encounters:   10/17/24 128/88    Less than 140/90 Yes   Dilated retinal exam : 03/08/2024 Annually Yes   Foot exam   : 02/16/2023 Annually Yes     Review of Systems   Constitutional:  Positive for appetite change (decreased). Negative for activity change.   Gastrointestinal:  Positive for constipation and diarrhea. Negative for abdominal pain, nausea and vomiting.        Hx of IBS, GERD   Endocrine: Positive for polydipsia. Negative for polyphagia and polyuria.   Musculoskeletal:  Positive for arthralgias.   Neurological:  Negative for syncope and weakness.        Neuropathy   Psychiatric/Behavioral:  Negative for confusion.         Depression         Objective:      Physical Exam  Constitutional:       General: She is not in acute distress.     Appearance: She is not ill-appearing.   Neck:      Thyroid: Mass: neg of self exam.   Neurological:      Mental Status: She is alert.   Psychiatric:         Speech: Speech normal.         Assessment:       No diagnosis found.                    Plan:   There are no diagnoses linked to this encounter.        OP5  Novolog  Basal 0.5 Units/hr  Insulin : Carb Ratios 15 g/Unit  Sensitivity (ISF, Correction) 42 mg/dL  BG Target Range 110 mg/dL (+0/-0)  BG Correction Threshold 110 mg/dL         PLAN:   - Condition: uncontrolled    - Monitor blood glucose 4x daily. Goals reviewed  - She is working with the RD  - BP and LDL- Recommended lifestyle modifications for management. Encouraged healthy low fat, low carb diet and increase physical activity  - Pump Changes: IC to 12, CF to 36, basal to  0.6  - Continue to see the psychiatrist for depression (causing reduced appetite); discussed coping techniques  - The patient was explained the above plan and given opportunity to ask questions.  She understands, chooses and consents to this plan and accepts all the risks, which include but are not limited to the risks mentioned above.   - Labs ordered as above  - Nurse visit:  deferred    - Follow up: 2 weeks and 4 weeks         For pump failure:    Use Novolog Insulin Carb ratio 1:20     Lantus 10 units once daily    Correction scale (for steroid use) :  Novolog every 3 hours using correction scale outside of mealtime.  -200: 2 units  -250: 4 units  -300: 6 units  -350: 8 units  BG greater than 350: 10 units      A total of 20 minutes was spent in face to face time, of which over 50% was spent in counseling patient on disease process, complications, treatment, and side effects of medications.

## 2024-10-28 ENCOUNTER — CLINICAL SUPPORT (OUTPATIENT)
Dept: DIABETES | Facility: CLINIC | Age: 54
End: 2024-10-28
Payer: MEDICAID

## 2024-10-28 ENCOUNTER — TELEPHONE (OUTPATIENT)
Dept: DIABETES | Facility: CLINIC | Age: 54
End: 2024-10-28
Payer: MEDICAID

## 2024-10-28 VITALS — WEIGHT: 204.38 LBS | BODY MASS INDEX: 45.82 KG/M2

## 2024-10-28 DIAGNOSIS — Z79.4 TYPE 2 DIABETES MELLITUS WITH HYPERGLYCEMIA, WITH LONG-TERM CURRENT USE OF INSULIN: Primary | Chronic | ICD-10-CM

## 2024-10-28 DIAGNOSIS — E26.9 HYPERALDOSTERONISM: ICD-10-CM

## 2024-10-28 DIAGNOSIS — E11.65 TYPE 2 DIABETES MELLITUS WITH HYPERGLYCEMIA, WITH LONG-TERM CURRENT USE OF INSULIN: ICD-10-CM

## 2024-10-28 DIAGNOSIS — I10 ESSENTIAL HYPERTENSION: Chronic | ICD-10-CM

## 2024-10-28 DIAGNOSIS — E87.5 HYPERKALEMIA: ICD-10-CM

## 2024-10-28 DIAGNOSIS — E11.65 TYPE 2 DIABETES MELLITUS WITH HYPERGLYCEMIA, WITH LONG-TERM CURRENT USE OF INSULIN: Primary | Chronic | ICD-10-CM

## 2024-10-28 DIAGNOSIS — Z79.4 TYPE 2 DIABETES MELLITUS WITH HYPERGLYCEMIA, WITH LONG-TERM CURRENT USE OF INSULIN: ICD-10-CM

## 2024-10-28 PROCEDURE — G0108 DIAB MANAGE TRN  PER INDIV: HCPCS | Mod: PBBFAC | Performed by: DIETITIAN, REGISTERED

## 2024-10-28 PROCEDURE — 99999 PR PBB SHADOW E&M-EST. PATIENT-LVL IV: CPT | Mod: PBBFAC,,, | Performed by: DIETITIAN, REGISTERED

## 2024-10-28 PROCEDURE — 99214 OFFICE O/P EST MOD 30 MIN: CPT | Mod: PBBFAC | Performed by: DIETITIAN, REGISTERED

## 2024-10-28 PROCEDURE — 99999PBSHW PR PBB SHADOW TECHNICAL ONLY FILED TO HB: Mod: PBBFAC,,,

## 2024-10-28 RX ORDER — FUROSEMIDE 20 MG/1
20 TABLET ORAL DAILY
Qty: 90 TABLET | Refills: 3 | Status: SHIPPED | OUTPATIENT
Start: 2024-10-28 | End: 2025-10-28

## 2024-10-28 RX ORDER — SPIRONOLACTONE 25 MG/1
25 TABLET ORAL EVERY MORNING
Qty: 90 TABLET | Refills: 1 | Status: SHIPPED | OUTPATIENT
Start: 2024-10-28

## 2024-10-28 RX ORDER — INSULIN ASPART 100 [IU]/ML
10 INJECTION, SOLUTION INTRAVENOUS; SUBCUTANEOUS
Qty: 15 ML | Refills: 3 | Status: SHIPPED | OUTPATIENT
Start: 2024-10-28

## 2024-11-04 ENCOUNTER — TELEPHONE (OUTPATIENT)
Dept: OPHTHALMOLOGY | Facility: CLINIC | Age: 54
End: 2024-11-04
Payer: MEDICAID

## 2024-11-04 NOTE — TELEPHONE ENCOUNTER
Patient will call back to get a Cataract surgeon appt. to do her cataract  ----- Message from Angie sent at 11/4/2024  4:13 PM CST -----  Contact: Yolanda  Mrs. Guerrero needs a call back at [phone] in regards to a message she received on her Mychart in regards her needing surgery on cataract    Thanks

## 2024-11-05 ENCOUNTER — TELEPHONE (OUTPATIENT)
Dept: RHEUMATOLOGY | Facility: CLINIC | Age: 54
End: 2024-11-05
Payer: MEDICAID

## 2024-11-05 ENCOUNTER — PATIENT MESSAGE (OUTPATIENT)
Dept: RHEUMATOLOGY | Facility: CLINIC | Age: 54
End: 2024-11-05
Payer: MEDICAID

## 2024-11-05 NOTE — TELEPHONE ENCOUNTER
----- Message from Nathanael sent at 11/5/2024  9:44 AM CST -----  Contact: [phone]  Type:  Sooner Apoointment Request    Caller is requesting a sooner appointment.  Caller declined first available appointment listed below.  Caller will not accept being placed on the waitlist and is requesting a message be sent to doctor.  Name of Caller:PILO YANCEY [[id number]]  When is the first available appointment?as soon as possible  Symptoms:check up   Would the patient rather a call back or a response via [facility]? Call back  Best Call Back Number: [phone]  Additional Information: n    [id number]

## 2024-11-06 ENCOUNTER — TELEPHONE (OUTPATIENT)
Dept: RHEUMATOLOGY | Facility: CLINIC | Age: 54
End: 2024-11-06
Payer: MEDICAID

## 2024-11-06 ENCOUNTER — NURSE TRIAGE (OUTPATIENT)
Dept: ADMINISTRATIVE | Facility: CLINIC | Age: 54
End: 2024-11-06
Payer: MEDICAID

## 2024-11-06 ENCOUNTER — OFFICE VISIT (OUTPATIENT)
Dept: DIABETES | Facility: CLINIC | Age: 54
End: 2024-11-06
Payer: MEDICAID

## 2024-11-06 DIAGNOSIS — E11.65 TYPE 2 DIABETES MELLITUS WITH HYPERGLYCEMIA, WITH LONG-TERM CURRENT USE OF INSULIN: Primary | ICD-10-CM

## 2024-11-06 DIAGNOSIS — R09.1 PLEURISY: ICD-10-CM

## 2024-11-06 DIAGNOSIS — G89.29 CHRONIC BILATERAL LOW BACK PAIN WITH BILATERAL SCIATICA: Primary | ICD-10-CM

## 2024-11-06 DIAGNOSIS — M54.41 CHRONIC BILATERAL LOW BACK PAIN WITH BILATERAL SCIATICA: Primary | ICD-10-CM

## 2024-11-06 DIAGNOSIS — M54.42 CHRONIC BILATERAL LOW BACK PAIN WITH BILATERAL SCIATICA: Primary | ICD-10-CM

## 2024-11-06 DIAGNOSIS — Z79.4 TYPE 2 DIABETES MELLITUS WITH HYPERGLYCEMIA, WITH LONG-TERM CURRENT USE OF INSULIN: Primary | ICD-10-CM

## 2024-11-06 PROCEDURE — 4010F ACE/ARB THERAPY RXD/TAKEN: CPT | Mod: CPTII,95,, | Performed by: NURSE PRACTITIONER

## 2024-11-06 PROCEDURE — 3044F HG A1C LEVEL LT 7.0%: CPT | Mod: CPTII,95,, | Performed by: NURSE PRACTITIONER

## 2024-11-06 PROCEDURE — 99213 OFFICE O/P EST LOW 20 MIN: CPT | Mod: 95,,, | Performed by: NURSE PRACTITIONER

## 2024-11-06 RX ORDER — METHYLPREDNISOLONE 4 MG/1
TABLET ORAL
Qty: 21 TABLET | Refills: 1 | Status: SHIPPED | OUTPATIENT
Start: 2024-11-06

## 2024-11-06 NOTE — PROGRESS NOTES
Subjective:         Patient ID: Yolanda Betts is a 54 y.o. female.  Patient's current PCP is Robe Britton MD.       Chief Complaint: No chief complaint on file.      HPI  Yolanda Betts is a 54 y.o. Black or  female presenting for a follow up for diabetes. Patient has been diagnosed with diabetes for > 10 years.  Currently on the omnipod 5.     Complications related to diabetes:  HTN, Hyperlipidemia, peripheral neuropathy; denies Pancreatitis; denies Gastroparesis; denies DKA; denies Hx/family Hx of MEN2/MTC; reports Frequent UTIs/yeast infections; Bariatric surgery 6/1/21. IBS- diarrhea/constipation    Past failed treatment include:  Jardiance- multiple yeast infections; Victoza - GI upset. GLP-1- not advised due to GI issues (per GI and DM management), Metformin- low BGs    Blood glucose testing is performed regularly with her Dexcom. Interpretation of CGMS as follows: Average Glucose: 207 mg/dl; 24 % Very High; 36% High; 40 % In Range; 0% Low;  1% Very Low (improved- previous avg 283)    Compliance:The patient reports medication and diet noncompliance - Since the last visit- BG have improved, working with the RD- counting carbs, calories.      The patient location is: [place], [place]  The chief complaint leading to consultation is: DM follow up    Visit type: audiovisual    Face to Face time with patient: 20 minutes of total time spent on the encounter, which includes face to face time and non-face to face time preparing to see the patient (eg, review of tests), Obtaining and/or reviewing separately obtained history, Documenting clinical information in the electronic or other health record, Independently interpreting results (not separately reported) and communicating results to the patient/family/caregiver, or Care coordination (not separately reported). Each patient to whom he or she provides medical services by telemedicine is:  (1) informed of the relationship between the physician  and patient and the respective role of any other health care provider with respect to management of the patient; and (2) notified that he or she may decline to receive medical services by telemedicine and may withdraw from such care at any time.         //   , There is no height or weight on file to calculate BMI.  Her blood sugar in clinic today is:   Lab Results   Component Value Date    POCGLU 78 03/10/2023       Labs reviewed and are noted below.  Her most recent A1C is:  Lab Results   Component Value Date    HGBA1C 6.6 (H) 06/27/2024    HGBA1C 6.5 (H) 11/29/2023    HGBA1C 5.6 08/08/2023     Lab Results   Component Value Date    CPEPTIDE 4.56 02/26/2018     Lab Results   Component Value Date    GLUTAMICACID 0.00 02/26/2018     Glucose   Date Value Ref Range Status   10/12/2024 289 (H) 70 - 110 mg/dL Final     Anion Gap   Date Value Ref Range Status   10/12/2024 9 8 - 16 mmol/L Final     eGFR if    Date Value Ref Range Status   03/08/2022 >60.0 >60 mL/min/1.73 m^2 Final     eGFR if non    Date Value Ref Range Status   03/08/2022 >60.0 >60 mL/min/1.73 m^2 Final     Comment:     Calculation used to obtain the estimated glomerular filtration  rate (eGFR) is the CKD-EPI equation.          CURRENT DM MEDICATIONS:   Diabetes Medications               glucagon (BAQSIMI) 3 mg/actuation Spry 1 spray by Nasal route as needed (hypoglycemia). For emergency use. May repeat 1 time after 15 minutes    insulin aspart U-100 (NOVOLOG FLEXPEN U-100 INSULIN) 100 unit/mL (3 mL) InPn pen Inject 24 Units into the skin 3 (three) times daily before meals. Use per sliding scale for breakfast and dinner    insulin aspart U-100 (NOVOLOG U-100 INSULIN ASPART) 100 unit/mL injection Inject 100 Units into the skin Every 3 (three) days. To use via insulin pump                   Diabetes Management Status    Statin: Taking  ACE/ARB: Taking    Screening or Prevention Patient's value Goal Complete/Controlled?   HgA1C  Testing and Control   Lab Results   Component Value Date    HGBA1C 6.6 (H) 06/27/2024      Annually/Less than 8% Yes   Lipid profile : 04/29/2024 Annually Yes   LDL control Lab Results   Component Value Date    LDLCALC 123 (H) 04/29/2024    Annually/Less than 100 mg/dl  Yes   Nephropathy screening Lab Results   Component Value Date    LABMICR 22.0 12/11/2023     Lab Results   Component Value Date    PROTEINUA Negative 10/12/2024    Annually Yes   Blood pressure BP Readings from Last 1 Encounters:   10/17/24 128/88    Less than 140/90 Yes   Dilated retinal exam : 03/08/2024 Annually Yes   Foot exam   : 02/16/2023 Annually Yes     Review of Systems   Constitutional:  Positive for appetite change (decreased). Negative for activity change.   Gastrointestinal:  Positive for constipation and diarrhea. Negative for abdominal pain, nausea and vomiting.        Hx of IBS, GERD   Endocrine: Positive for polydipsia. Negative for polyphagia and polyuria.   Musculoskeletal:  Positive for arthralgias.   Neurological:  Negative for syncope and weakness.        Neuropathy   Psychiatric/Behavioral:  Negative for confusion.         Depression         Objective:      Physical Exam  Constitutional:       General: She is not in acute distress.     Appearance: She is not ill-appearing, toxic-appearing or diaphoretic.   Neurological:      Mental Status: She is alert and oriented to person, place, and time.   Psychiatric:         Mood and Affect: Mood normal.         Assessment:       1. Type 2 diabetes mellitus with hyperglycemia, with long-term current use of insulin                Plan:   Type 2 diabetes mellitus with hyperglycemia, with long-term current use of insulin            OP5  Novolog  Basal 0.5 Units/hr  Insulin : Carb Ratios 12 g/Unit  Sensitivity (ISF, Correction) 36 mg/dL  BG Target Range 110 mg/dL (+0/-0)  BG Correction Threshold 110 mg/dL         PLAN:   - Condition: uncontrolled    - Monitor blood glucose 4x daily. Goals  reviewed  - She is working with the RD  - BP and LDL- Recommended lifestyle modifications for management. Encouraged healthy low fat, low carb diet and increase physical activity  - Pump Changes: IC to 10, CF to 31  - Continue to see the psychiatrist for depression (causing reduced appetite); discussed coping techniques  - The patient was explained the above plan and given opportunity to ask questions.  She understands, chooses and consents to this plan and accepts all the risks, which include but are not limited to the risks mentioned above.   - Labs ordered as above  - Nurse visit:  deferred    - Follow up: 2 weeks and 4 weeks         For pump failure:    Use Novolog Insulin Carb ratio 1:20     Lantus 10 units once daily    Correction scale (for steroid use) :  Novolog every 3 hours using correction scale outside of mealtime.  -200: 2 units  -250: 4 units  -300: 6 units  -350: 8 units  BG greater than 350: 10 units      A total of 20 minutes was spent in face to face time, of which over 50% was spent in counseling patient on disease process, complications, treatment, and side effects of medications.

## 2024-11-06 NOTE — TELEPHONE ENCOUNTER
----- Message from Summer sent at 11/6/2024  4:45 PM CST -----  Contact: Yolanda Guerrero is needing a call back requesting medication for pain. Please call back at [phone]. Pt is in reallyb back pain.   [id number] - [place] - [address] [facility]  [address] [place] [address]  Phone: [phone] Fax: [phone]

## 2024-11-06 NOTE — TELEPHONE ENCOUNTER
Please advice to try Medrol Dosepak.  Refills sent to pharmacy.  Request to go to emergency room if no improvement noted.

## 2024-11-06 NOTE — TELEPHONE ENCOUNTER
Patient states that she is having pain all over . States no swelling or redness . Pain started Monday . Pain scale  10/10 . Patient is wanting a call back today regarding what she can take for her pain

## 2024-11-06 NOTE — TELEPHONE ENCOUNTER
Pt reports joint pain, back pain, and shoulder pain back pain that began 3 days ago. Pt reports current 10/10 pain. Pt reports hx of ankylosing spondylitis. Pt reports usually she is prescribed steroids but has been unable to get in touch with her doctor. Care advice to see physician within 4 hours or PCP triage. Secure chat sent to Tamia. MD Tatyana verbalized that pt's doctor called in medication 1 minute ago. Upon chart review MD Renetta has note that medrol dosepak refill was sent to pharmacy and pt should go to ED if no improvement from medication. Pt made aware and verbalizes understanding.   Reason for Disposition   [1] SEVERE pain (e.g., excruciating, unable to do any normal activities) AND [2] not improved 2 hours after pain medicine    Additional Information   Negative: Shock suspected (e.g., cold/pale/clammy skin, too weak to stand, low BP, rapid pulse)   Negative: Difficult to awaken or acting confused (e.g., disoriented, slurred speech)   Negative: Sounds like a life-threatening emergency to the triager   Negative: [1] Drinking very little AND [2] dehydration suspected (e.g., no urine > 12 hours, very dry mouth, very lightheaded)   Negative: Dark (cola or tea-colored) or red-colored urine   Negative: Patient sounds very sick or weak to the triager    Protocols used: Muscle Aches and Body Pain-A-AH

## 2024-11-06 NOTE — PLAN OF CARE
[facility] Emergency [facility] Plan of Care Note    Referral source: Nurse On-Call      Reason for consult: Pt reports joint pain, back pain, and shoulder pain back pain that began 3 days ago. Pt reports current 10/10 pain. Pt reports hx of ankylosing spondylitis. Pt reports usually she is prescribed steroids but has been unable to get in touch with her doctor.       Disposition recommended: Treat in place      Additional Recommendations:  Chart reviewed, there is a note from 2 minutes ago from patient's doctor stating that they are sending a Medrol Dosepak to her pharmacy.  I advised her to  the prescription that was sent by her doctor and follow up with them.

## 2024-11-07 DIAGNOSIS — F25.0 SCHIZOAFFECTIVE DISORDER, BIPOLAR TYPE: Chronic | ICD-10-CM

## 2024-11-08 RX ORDER — LAMOTRIGINE 200 MG/1
200 TABLET ORAL 2 TIMES DAILY
Qty: 60 TABLET | Refills: 2 | Status: SHIPPED | OUTPATIENT
Start: 2024-11-08 | End: 2025-02-06

## 2024-11-12 DIAGNOSIS — I10 ESSENTIAL HYPERTENSION: Chronic | ICD-10-CM

## 2024-11-12 DIAGNOSIS — M79.7 FIBROMYALGIA: ICD-10-CM

## 2024-11-12 RX ORDER — LANOLIN ALCOHOL/MO/W.PET/CERES
400 CREAM (GRAM) TOPICAL DAILY
Qty: 90 TABLET | Refills: 3 | Status: SHIPPED | OUTPATIENT
Start: 2024-11-12 | End: 2025-11-07

## 2024-11-12 RX ORDER — CYCLOBENZAPRINE HCL 10 MG
10 TABLET ORAL 3 TIMES DAILY PRN
Qty: 90 TABLET | Refills: 11 | Status: SHIPPED | OUTPATIENT
Start: 2024-11-12

## 2024-11-19 ENCOUNTER — CLINICAL SUPPORT (OUTPATIENT)
Dept: DIABETES | Facility: CLINIC | Age: 54
End: 2024-11-19
Payer: MEDICAID

## 2024-11-19 ENCOUNTER — TELEPHONE (OUTPATIENT)
Dept: PSYCHIATRY | Facility: CLINIC | Age: 54
End: 2024-11-19
Payer: MEDICAID

## 2024-11-19 ENCOUNTER — PATIENT MESSAGE (OUTPATIENT)
Dept: PSYCHIATRY | Facility: CLINIC | Age: 54
End: 2024-11-19
Payer: MEDICAID

## 2024-11-19 ENCOUNTER — TELEPHONE (OUTPATIENT)
Dept: CARDIOLOGY | Facility: CLINIC | Age: 54
End: 2024-11-19
Payer: MEDICAID

## 2024-11-19 DIAGNOSIS — F25.0 SCHIZOAFFECTIVE DISORDER, BIPOLAR TYPE: Primary | ICD-10-CM

## 2024-11-19 DIAGNOSIS — E11.65 TYPE 2 DIABETES MELLITUS WITH HYPERGLYCEMIA, WITH LONG-TERM CURRENT USE OF INSULIN: Primary | Chronic | ICD-10-CM

## 2024-11-19 DIAGNOSIS — G25.3 MYOCLONUS: ICD-10-CM

## 2024-11-19 DIAGNOSIS — Z79.4 TYPE 2 DIABETES MELLITUS WITH HYPERGLYCEMIA, WITH LONG-TERM CURRENT USE OF INSULIN: Primary | Chronic | ICD-10-CM

## 2024-11-19 PROCEDURE — G0108 DIAB MANAGE TRN  PER INDIV: HCPCS | Mod: 95,,, | Performed by: DIETITIAN, REGISTERED

## 2024-11-19 RX ORDER — LUMATEPERONE 10.5 MG/1
10.5 CAPSULE ORAL DAILY
Qty: 30 CAPSULE | Refills: 1 | Status: SHIPPED | OUTPATIENT
Start: 2024-11-19 | End: 2025-01-18

## 2024-11-19 NOTE — TELEPHONE ENCOUNTER
----- Message from Rolando sent at 11/19/2024 10:29 AM CST -----  Contact: self  .Type:  Needs Medical Advice    Who Called: .Yolanda Betts    Pharmacy name and phone #:  .  [facility]  [address]  [place] [address]  Phone: [phone] Fax: [phone]  Would the patient rather a call back or a response via [facility]? Call back  Best Call Back Number: .[phone]  Additional Information: pt wants a call back regarding amlodipine-valsartan (EXFORGE)  mg per tablet.

## 2024-11-19 NOTE — TELEPHONE ENCOUNTER
Contacted patient she stated that the medication amlodipine valsartan is causing her to have low bp 98/40 and she has been having some swelling. She is wanting to know can her medication be changed or does she need to come in to be seen.

## 2024-11-19 NOTE — Clinical Note
Bebeto Johnson,  I met w/ Ms Yolanda today. She had a recent ER visit for Myoclonus and also reports hypoglycemia episode prior ER visit. She's maintaining auto mode better. But, her BG is shifting greatly due to missed/late bolus dosing. She did have increased activity on days with hypoglycemia events. We discussed several areas including bolus timing, carb/pro balance, hypo tx, OP5 activity feature.   Her current TBS/Correct Above is 110. What do you think about raising to 130 to help smooth patterns, which may give her more reassurance to bolus timely? Next visit w/ you is 12/2. Thank you! Yara

## 2024-11-19 NOTE — TELEPHONE ENCOUNTER
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  "

## 2024-11-19 NOTE — PROGRESS NOTES
Diabetes Care Specialist Virtual Visit Note       The patient location is: home in [place]  The chief complaint leading to consultation is: Diabetes  Visit type: audiovisual  Total time spent with patient: 60 min   Each patient to whom he or she provides medical services by telemedicine is:  (1) informed of the relationship between the physician and patient and the respective role of any other health care provider with respect to management of the patient; and (2) notified that he or she may decline to receive medical services by telemedicine and may withdraw from such care at any time.    Diabetes Care Specialist Progress Note  Author: Yara Sanchez RD, CDE  Date: 11/19/2024    Intake    Program Intake  Reason for Diabetes Program Visit:: Intervention (DM referral 2/5/24 Elizabeth Ceron, IRASEMA)  Type of Intervention:: Individual  Education: Advanced Pump/OP5, Nutrition and Meal Planning, Self-Management Skill Review   Current diabetes risk level:: high  In the last month, have you used the ER or been admitted to the hospital: Yes  Was the ER or hospital admission related to diabetes?: Yes - Myoclonus. Pt reports hypoglycemia episode prior ER visit.    Current Diabetes Treatment:  (Novolog via OP5 and Dex G6 (training 2/26/24))    Continuous Glucose Monitoring  Personal CGM type:: Dexcom G6 - pt denies device or placement issues. Reports reviewed w/ pt and saved media.  GMI Date: 11/19/24  GMI Value: 7.5 %    Lab Results   Component Value Date    HGBA1C 6.6 (H) 06/27/2024     Lifestyle Coping Support & Clinical    Problem Review  Active Comorbidities:  (HTN, HLD, Neuropathy, VitD defn, Bariatric surgery 6/1/21, IBS- diarrhea/constipation, GERD, DAVIS, Iron defn anemia, Asthma, Mental health ds, BMI 45.82.)    Diabetes Self-Management Skills Assessment    Medication Skills Assessment  Patient is able to identify current diabetes medications, dosages, and appropriate timing of medications.: no (Pt reports late and missed  bolus dosing. She denies entering fake carbs.)  Patient reports problems or concerns with current medication regimen.: yes  Medication regimen problems/concerns:: does not feel like regimen is working, concerned about side effects (Pt states concerns to bolus before meal if BG 80s.)  Patient is  aware that some diabetes medications can cause low blood sugar?: Yes  Medication Skills Assessment Completed:: Yes  Assessment indicates:: Instruction Needed  Area of need?: Yes    Nutrition/Healthy Eating  Meal Plan 24 Hour Recall - Breakfast: cottage cheese w/ peaches OR oatmeal/cream of wheat OR 1-2 pancake  Meal Plan 24 Hour Recall - Lunch: vegetarian chicken jeremias or sausage, cheese on croissant  Meal Plan 24 Hour Recall - Dinner: Canes 2/3 serv fries, toast, no chix OR vegetarian chicken jeremias on criossant, mashed potatoes, broccoli OR salmon deepa w/out breading and veggie  Meal Plan 24 Hour Recall - Snack: flan dessert and chips  Meal Plan 24 Hour Recall - Beverage: Water OR vitamin water OR reg lemonade (occs)  Who shops/cooks?: mostly self, dtr - pt denies food insecurity issues  Patient can identify foods that impact blood sugar.: yes  Challenges to healthy eating:: snacking between meals and at night (irregular protein intake)  Nutrition/Healthy Eating Skills Assessment Completed:: Yes  Assessment indicates:: Instruction Needed  Area of need?: Yes    Physical Activity/Exercise  Physical Activity/Exercise Skills Assessment Completed: : No  Deffered due to:: Time  Area of need?: Deferred    Home Blood Glucose Monitoring  Patient states that blood sugar is checked at home daily.: yes  Monitoring Method:: home glucometer, personal continuous glucose monitor  Home glucometer meter type:: Unknown & rarely using  Personal CGM type:: Dexcom G6 - pt denies device or placement issues. Reports reviewed w/ pt and saved media.  Home Blood Glucose Monitoring Skills Assessment Completed: : Yes  Assessment indicates:: Adequate  understanding  Area of need?: No    Acute Complications  Have you ever had hypoglycemia (low BG 70 or less)?: yes (Noted hypoglycemia patterns related to days w/ increased activity (conference walking and shopping).)  How do you treat hypoglycemia?: Pt treating with abdoul candy and reports delays in BG response.  Have you ever had hyperglycemia (high  or more)?: yes (Pt reports fatigue and feeling drained when glucose is shifting greatly.)  Acute Complications Skills Assessment Completed: : Yes  Assessment indicates:: Instruction Needed  Area of need?: Yes    Assessment Summary and Plan  Based on today's diabetes care assessment, the following areas of need were identified:      Identified Areas of Need      Medication/Current Diabetes Treatment: Yes - see care plan   Nutrition/Healthy Eating: Yes - see care plan   Physical Activity/Exercise: Deferred    Home Blood Glucose Monitoring: No    Acute Complications: Yes - see care plan     Today's interventions were provided through individual discussion, instruction, and written materials were provided.    Patient verbalized understanding of instruction and written materials.  Pt was able to return back demonstration of instructions today. Patient understood key points, needs reinforcement and further instruction.     Diabetes Self-Management Care Plan:  Today's Diabetes Self-Management Care Plan was developed with Yolanda's input. Yolanda has agreed to work toward the following goal(s) to improve his/her overall diabetes control.      Care Plan: Diabetes Management   Updates made since 11/20/2023 12:00 AM        Problem: Medications         Goal: Patient Agrees to take Diabetes Medication(s) as prescribed.    Start Date: 2/6/2024   Expected End Date: 2/5/2025   This Visit's Progress: On track   Recent Progress: On track   Priority: High   Barriers: Other (comments)   Note:    Encouraged progress of maintaining OP5 automated mode.     Using OP5 reports, reviewed episodes  of missed/late bolus dosing contributing to great shifts in glucose. Discussed how this can contribute to her feeling tired/drained and also risk for pp hypoglycemia.      Reassurance provided to enter bolus 10-15min or right before meals/snacks. Encouraged pt to use Nutrition Food gill (Cronometer) for carb ctg support; practiced ex today using recent Canes meal.     Instructed pt on Activity feature, when and how to use to help prevent low BG patterns during periods of increased walking/exercise. Discussed when to use backup glucometer.      Message to provider Abner to review possible adjustment TBS/Correct Above from 110 to 130 to help smooth glucose patterns and support pt on bolus consistency/timing.     Encouraged upcoming follow-up with providers Abner for diabetes medical mgmt and Dr. Young for psych.  Also, pt reports upcoming neuro visit.      Problem: Healthy Eating         Goal: Eat 5 small meals daily to assist carb management and adequate nutrition intake.    Start Date: 2/6/2024   Expected End Date: 2/5/2025   This Visit's Progress: On track   Recent Progress: No change   Priority: Medium   Barriers: Other (comments)   Note:    Encouraged improvement in meal frequency.    Emphasis on pairing carbs and protein for BG stability. Discussed adding eggs, cheese, and nuts with fruits. Discussed meat alternatives and seafood options for meal times.     Emphasis on having quick acting snacks/drinks readily available for low BG treatment (orange juice, skittles, glucose tabs, soft peppermints) and to follow low BG treatment with protein snack (pb crackers, nuts).     Pt denies food insecurity issues today and states varied food staples at home. She reports dining out and family support.     Discussed need to structure meal/snack times vs grazing style eating to support insulin pump bolus applications.     Follow Up Plan   Follow up in about 4 weeks (around 12/17/2024).  -review OP5 reports  -stepan  goals    Today's care plan and follow up schedule was discussed with patient.  Yolanda verbalized understanding of the care plan, goals, and agrees to follow up plan.        The patient was encouraged to communicate with his/her health care provider/physician and care team regarding his/her condition(s) and treatment.  I provided the patient with my contact information today and encouraged to contact me via phone or [facility] Patient Portal as needed.     Length of Visit   Total Time: 60 Minutes

## 2024-11-21 ENCOUNTER — OFFICE VISIT (OUTPATIENT)
Dept: PULMONOLOGY | Facility: CLINIC | Age: 54
End: 2024-11-21
Payer: MEDICAID

## 2024-11-21 ENCOUNTER — PATIENT MESSAGE (OUTPATIENT)
Dept: DIABETES | Facility: CLINIC | Age: 54
End: 2024-11-21
Payer: MEDICAID

## 2024-11-21 ENCOUNTER — TELEPHONE (OUTPATIENT)
Dept: RHEUMATOLOGY | Facility: CLINIC | Age: 54
End: 2024-11-21
Payer: MEDICAID

## 2024-11-21 VITALS
SYSTOLIC BLOOD PRESSURE: 150 MMHG | OXYGEN SATURATION: 99 % | HEIGHT: 56 IN | BODY MASS INDEX: 47.21 KG/M2 | WEIGHT: 209.88 LBS | HEART RATE: 95 BPM | DIASTOLIC BLOOD PRESSURE: 90 MMHG | RESPIRATION RATE: 18 BRPM

## 2024-11-21 DIAGNOSIS — M79.7 FIBROMYALGIA: Primary | ICD-10-CM

## 2024-11-21 DIAGNOSIS — M45.9 ANKYLOSING SPONDYLITIS, UNSPECIFIED SITE OF SPINE: ICD-10-CM

## 2024-11-21 DIAGNOSIS — M54.41 CHRONIC BILATERAL LOW BACK PAIN WITH BILATERAL SCIATICA: ICD-10-CM

## 2024-11-21 DIAGNOSIS — M54.42 CHRONIC BILATERAL LOW BACK PAIN WITH BILATERAL SCIATICA: ICD-10-CM

## 2024-11-21 DIAGNOSIS — G47.33 OBSTRUCTIVE SLEEP APNEA: Primary | ICD-10-CM

## 2024-11-21 DIAGNOSIS — G89.29 CHRONIC BILATERAL LOW BACK PAIN WITH BILATERAL SCIATICA: ICD-10-CM

## 2024-11-21 PROCEDURE — 99215 OFFICE O/P EST HI 40 MIN: CPT | Mod: PBBFAC | Performed by: HOSPITALIST

## 2024-11-21 PROCEDURE — 99999 PR PBB SHADOW E&M-EST. PATIENT-LVL V: CPT | Mod: PBBFAC,,, | Performed by: HOSPITALIST

## 2024-11-21 NOTE — TELEPHONE ENCOUNTER
Patient call to let us know that she has had a seizure and  that she is weak. Her PCP ordered blood work on her. She wants to know should she be taking the dosepack or not .

## 2024-11-21 NOTE — ASSESSMENT & PLAN NOTE
- pt presents for initial compliance review  - needs more consistency, discussed goals with patient so that she can keep machine and receive supplies, she expressed understanding  - encouraged to make appt with Roopa to address mask leak

## 2024-11-21 NOTE — TELEPHONE ENCOUNTER
----- Message from Milagros sent at 11/21/2024  1:42 PM CST -----  Type:  Needs Medical Advice    Who Called: pt  Symptoms (please be specific): pain   How long has patient had these symptoms:  na  Pharmacy name and phone #:  na  Would the patient rather a call back or a response via [facility]? call  Best Call Back Number: [phone]    Additional Information: requesting to speak with nurse regarding pain, requesting a call asap

## 2024-11-21 NOTE — PROGRESS NOTES
"Subjective:      Patient ID: Yolanda Betts is a 54 y.o. female.    Chief Complaint: Asthma, Sleep Apnea    Interval Hx 11/21/24:    Mrs. Betts presents today for follow up sleep apnea. She was last seen 9/2024 by Dr. Lopes for HST review and asthma. At that time she reported persistent dyspnea and fatigue.     Mask leaking, feels like it maybe loosens in the nights. Wears a nasal mask. Has chronic dry mouth. Hasn't woken with air coming out of mouth. Having trouble going back to sleep.     Pertinent Work Up:  - HST 9/2024- AHI 12, 0% study with SpO2 <90%  - 6MW 9/12/24- Resting sat 100%, range % on room air, Deysi 3-6, 312meters (68% predicted)  - ECHO 9/2024- EF 55-60%, normal diastolic function, no noted valve disease, CVP 3mmHg  - LHC 9/2024- Normal coronary arteries, elevated LV end distolic pressure, diastolic dysfunction  - FeNO 8/2024- 21ppb  - [place] 8/2024- Normal kee, no significant BD response    Pulmonary Interventions:  - Albuterol neb  - Albuterol hfa  - Symbicort        Review of Systems   Respiratory:  Positive for somnolence. Negative for snoring.      Objective:     Physical Exam   Constitutional: She is oriented to person, place, and time. She appears well-developed and well-nourished. She is obese.   Cardiovascular: Normal rate and regular rhythm.   Pulmonary/Chest: Normal expansion, effort normal and breath sounds normal.   Neurological: She is alert and oriented to person, place, and time.   Skin: Skin is warm and dry.     Personal Diagnostic Review  As Above      11/17/2024     3:00 PM 11/17/2024     2:00 PM 11/17/2024     1:00 PM 11/17/2024    11:41 AM 11/17/2024    11:26 AM 10/28/2024     3:17 PM 10/17/2024    12:53 PM   Pulmonary Function Tests   SpO2 96 % 100 % 97 % 98 % 97 %     Height       4' 8" (1.422 m)   Weight      92.7 kg (204 lb 5.9 oz) 93.7 kg (206 lb 9.1 oz)   BMI (Calculated)       46.3        Assessment:     No diagnosis found.     Outpatient Encounter " "Medications as of 11/21/2024   Medication Sig Dispense Refill    albuterol (PROVENTIL) 2.5 mg /3 mL (0.083 %) nebulizer solution Take 2.5 mg by nebulization every 4 (four) hours as needed. Times a day 75 mL 1    albuterol (PROVENTIL/VENTOLIN HFA) 90 mcg/actuation inhaler Inhale 2 puffs into the lungs every 6 (six) hours as needed for Wheezing. 8.5 g 1    ALPRAZolam (XANAX) 1 MG tablet Take 1 tablet (1 mg total) by mouth 2 (two) times daily as needed for Anxiety. 60 tablet 2    amlodipine-valsartan (EXFORGE)  mg per tablet Take 1 tablet by mouth once daily. 90 tablet 1    atorvastatin (LIPITOR) 20 MG tablet Take 1 tablet (20 mg total) by mouth every evening 90 tablet 3    azelastine (ASTELIN) 137 mcg (0.1 %) nasal spray 2 sprays (274 mcg total) by Nasal route 2 (two) times daily. 30 mL 0    azelastine-fluticasone (DYMISTA) 137-50 mcg/spray Spry nassal spray Take 1 puff by Nasal route in the morning and 1 puff before bedtime. Do all this for 30 days. 23 g 3    BD INSULIN SYRINGE ULTRA-FINE 1/2 mL 30 gauge x 1/2" Syrg   0    blood-glucose meter,continuous (DEXCOM G7 ) Misc 1 each by Misc.(Non-Drug; Combo Route) route once. for 1 dose 1 each 0    blood-glucose sensor (DEXCOM G6 SENSOR) Mariela change sensor every 10 days. 3 each 11    blood-glucose sensor (DEXCOM G7 SENSOR) Mariela Use to check bg. Change every 10 days 3 each 11    blood-glucose transmitter (DEXCOM G6 TRANSMITTER) Mariela 1 each by Misc.(Non-Drug; Combo Route) route every 3 (three) months. 1 each 3    blood-glucose transmitter (DEXCOM G6 TRANSMITTER) Mariela Use as directed change every 3 months 1 each 3    celecoxib (CELEBREX) 100 MG capsule Take 1 capsule (100 mg total) by mouth 2 (two) times daily. 60 capsule 2    cevimeline (EVOXAC) 30 mg capsule Take 1 capsule by mouth in the morning and 1 capsule at noon and 1 capsule before bedtime. Do all this for 30 days. 90 capsule 0    cloNIDine (CATAPRES) 0.1 MG tablet Take 1 tablet (0.1 mg total) by mouth " "2 (two) times daily. 60 tablet 11    cyclobenzaprine (FLEXERIL) 10 MG tablet Take 1 tablet (10 mg total) by mouth 3 (three) times daily as needed (Pain). 90 tablet 11    diazePAM (VALIUM) 5 MG tablet Take 1 tablet (5 mg total) by mouth once. 45 minutes to 1 hour prior to MRI for procedural anxiety for 1 dose 1 tablet 0    DULoxetine (CYMBALTA) 60 MG capsule Take 1 capsule by mouth 2 times daily 180 capsule 3    ergocalciferol (VITAMIN D2) 50,000 unit Cap Take 1 capsule twice weekly for 3 weeks then weekly 12 capsule 3    estradioL (ESTRACE) 0.01 % (0.1 mg/gram) vaginal cream Place 1 g vaginally twice a week. 42.5 g 3    fenofibrate (TRICOR) 145 MG tablet Take 1 tablet (145 mg total) by mouth once daily. 90 tablet 3    furosemide (LASIX) 20 MG tablet Take 1 tablet (20 mg total) by mouth once daily. 90 tablet 3    glucagon (BAQSIMI) 3 mg/actuation Spry 1 spray by Nasal route as needed (hypoglycemia). For emergency use. May repeat 1 time after 15 minutes 2 each 1    glucagon (BAQSIMI) 3 mg/actuation Rentz SPRAY 1 SPRAY IN NOSTRIL AS NEEDED FOR EMERGENCY. MAY REPEAT 1 TIME AFTER 15 MINUTES 2 each 1    insulin aspart U-100 (NOVOLOG FLEXPEN U-100 INSULIN) 100 unit/mL (3 mL) InPn pen Inject up to 10 units under the skin per sliding scale 3 times a day before meals 15 mL 3    insulin aspart U-100 (NOVOLOG U-100 INSULIN ASPART) 100 unit/mL injection To use via insulin pump. Up to 200 units every 3 days 20 mL 5    insulin aspart U-100 (NOVOLOG) 100 unit/mL injection NovoLOG FlexPen      insulin glargine U-100, Lantus, (LANTUS SOLOSTAR U-100 INSULIN) 100 unit/mL (3 mL) InPn pen Inject up to 50 units daily in event of pump failure 15 mL 3    insulin pump cart,automated,BT (OMNIPOD 5 G6 PODS, GEN 5,) Crtg 1 each by Misc.(Non-Drug; Combo Route) route every 48 hours. 45 each 3    insulin syringe-needle U-100 0.3 mL 30 gauge x 5/16" Syrg 1 each by Misc.(Non-Drug; Combo Route) route Every 3 (three) days. 100 each 2    lamoTRIgine " "(LAMICTAL) 200 MG tablet Take 1 tablet (200 mg total) by mouth 2 (two) times daily. 60 tablet 2    levocetirizine (XYZAL) 5 MG tablet Take 1 tablet (5 mg total) by mouth every evening. 30 tablet 11    LIDOcaine (LIDODERM) 5 % Place 2 patches onto the skin every 12 (twelve) hours as needed (pain). Remove & Discard patch within 12 hours or as directed by MD 60 patch 11    lifitegrast (XIIDRA) 5 % Dpet Place 1 drop into both eyes 2 (two) times daily. 60 each 12    lumateperone (CAPLYTA) 10.5 mg Cap Take 1 capsule (10.5 mg total) by mouth once daily. 30 capsule 1    magic mouthwash diphen/antac/lidoc/nysta Swish and spit 5 mLs every 4 (four) hours as needed. 118 mL 0    magnesium hydroxide 400 mg/5 ml (MILK OF MAGNESIA) 400 mg/5 mL Susp Take 5 mLs by mouth every 4 (four) hours as needed.      magnesium oxide (MAG-OX) 400 mg (241.3 mg magnesium) tablet Take 1 tablet (400 mg total) by mouth once daily. 90 tablet 3    methylPREDNISolone (MEDROL DOSEPACK) 4 mg tablet use as directed 21 tablet 1    metoprolol succinate (TOPROL-XL) 50 MG 24 hr tablet Take 1 tablet (50 mg total) by mouth every morning. 30 tablet 11    nebulizer and compressor (COMP-AIR NEBULIZER COMPRESSOR) Mariela use as directed 1 each 0    omeprazole (PRILOSEC) 40 MG capsule Take 1 capsule (40 mg total) by mouth once daily. 90 capsule 1    OZEMPIC 2 mg/dose (8 mg/3 mL) PnIj Inject 2 mg into the skin every 7 days.      pen needle, diabetic (BD ULTRA-FINE MINI PEN NEEDLE) 31 gauge x 3/16" Ndle Use 1 a day in event of pump failure 100 each 11    polyethylene glycol (GLYCOLAX) 17 gram/dose powder Take 17 g by mouth 2 (two) times daily. 1020 g 11    promethazine (PHENERGAN) 25 MG tablet Take 25 mg by mouth every 6 (six) hours as needed.      secukinumab (COSENTYX PEN, 2 PENS,) 150 mg/mL PnIj Inject 300 mg into the skin every 14 (fourteen) days. 4 mL 11    semaglutide (OZEMPIC) 2 mg/dose (8 mg/3 mL) PnElis Inject 2 mg into the skin every 7 days. 3 mL 2    " spironolactone (ALDACTONE) 25 MG tablet Take 1 tablet by mouth in the morning. 90 tablet 1    SYMBICORT 160-4.5 mcg/actuation HFAA Inhale 2 puffs into the lungs in the morning and 2 puffs before bedtime. 2 puffs every 12 hours 10.2 g 2    traMADoL (ULTRAM) 50 mg tablet Take 1 tablet (50 mg total) by mouth every 6 (six) hours as needed for Pain. (Patient not taking: Reported on 11/6/2024) 12 tablet 0    triamcinolone acetonide 0.1% (KENALOG) 0.1 % paste Apply coating 2 times daily 5 g 12    zolpidem (AMBIEN) 5 MG Tab Take 1 tablet (5 mg total) by mouth nightly as needed (anxiety). 30 tablet 2    [DISCONTINUED] cevimeline (EVOXAC) 30 mg capsule Take 1 capsule by mouth in the morning and 1 capsule at noon and 1 capsule before bedtime. Do all this for 30 days. 90 capsule 0    [DISCONTINUED] clonazePAM (KLONOPIN) 1 MG tablet Take 1 tablet by mouth daily 30 tablet 0    [DISCONTINUED] cyclobenzaprine (FLEXERIL) 10 MG tablet Take 1 tablet (10 mg total) by mouth 3 (three) times daily as needed (Pain). 90 tablet 11    [DISCONTINUED] furosemide (LASIX) 20 MG tablet Take 1 tablet (20 mg total) by mouth once daily. 90 tablet 3    [DISCONTINUED] glucagon, human recombinant, (GLUCAGON EMERGENCY KIT, HUMAN,) 1 mg SolR Inject 1 mg into the muscle as needed. Emergency use 1 each 1    [DISCONTINUED] lamoTRIgine (LAMICTAL) 200 MG tablet Take 1 tablet (200 mg total) by mouth 2 (two) times daily. 60 tablet 2    [DISCONTINUED] lumateperone (CAPLYTA) 21 mg Cap Take 1 capsule (21 mg total) by mouth once daily. 30 capsule 2    [DISCONTINUED] magnesium oxide (MAG-OX) 400 mg (241.3 mg magnesium) tablet Take 1 tablet (400 mg total) by mouth once daily. 90 tablet 0    [DISCONTINUED] methylPREDNISolone (MEDROL DOSEPACK) 4 mg tablet use as directed 21 tablet 1    [DISCONTINUED] spironolactone (ALDACTONE) 25 MG tablet Take 1 tablet by mouth in the morning. 90 tablet 1    [DISCONTINUED] valsartan (DIOVAN) 320 MG tablet Take 1 tablet (320 mg total)  by mouth once daily. 90 tablet 3     Facility-Administered Encounter Medications as of 11/21/2024   Medication Dose Route Frequency Provider Last Rate Last Admin    ondansetron injection 4 mg  4 mg Intravenous Once PRN Otto Phillips MD         No orders of the defined types were placed in this encounter.      Plan:     Problem List Items Addressed This Visit       Obstructive sleep apnea - Primary     - pt presents for initial compliance review  - needs more consistency, discussed goals with patient so that she can keep machine and receive supplies, she expressed understanding  - encouraged to make appt with Roopa to address mask leak            Follow up in 3 months for follow up asthma and sleep apnea review.

## 2024-11-22 RX ORDER — SULFASALAZINE 500 MG/1
500 TABLET, DELAYED RELEASE ORAL 2 TIMES DAILY
Qty: 60 TABLET | Refills: 0 | Status: SHIPPED | OUTPATIENT
Start: 2024-11-22

## 2024-11-22 NOTE — TELEPHONE ENCOUNTER
Patient wants to know what she should do in the meantime since she is in so much pain. Patient states that she is in pain right now

## 2024-11-25 ENCOUNTER — TELEPHONE (OUTPATIENT)
Dept: DIABETES | Facility: CLINIC | Age: 54
End: 2024-11-25
Payer: MEDICAID

## 2024-11-25 NOTE — TELEPHONE ENCOUNTER
Spoke with pt. Assisted her in adjusting Target Glu and Correct Above Glu from 110 to 130 per review w/ provider Abner. Pt verbalized setting changes. Successful outreach.

## 2024-11-27 ENCOUNTER — LAB VISIT (OUTPATIENT)
Dept: LAB | Facility: HOSPITAL | Age: 54
End: 2024-11-27
Attending: NURSE PRACTITIONER
Payer: MEDICAID

## 2024-11-27 ENCOUNTER — OFFICE VISIT (OUTPATIENT)
Dept: PRIMARY CARE CLINIC | Facility: CLINIC | Age: 54
End: 2024-11-27
Payer: MEDICAID

## 2024-11-27 VITALS
HEART RATE: 93 BPM | DIASTOLIC BLOOD PRESSURE: 72 MMHG | TEMPERATURE: 99 F | BODY MASS INDEX: 46.12 KG/M2 | SYSTOLIC BLOOD PRESSURE: 112 MMHG | WEIGHT: 205 LBS | HEIGHT: 56 IN | OXYGEN SATURATION: 98 %

## 2024-11-27 DIAGNOSIS — Z12.31 BREAST CANCER SCREENING BY MAMMOGRAM: ICD-10-CM

## 2024-11-27 DIAGNOSIS — G40.409 MYOCLONIC SEIZURE: Primary | ICD-10-CM

## 2024-11-27 DIAGNOSIS — E26.9 HYPERALDOSTERONISM: ICD-10-CM

## 2024-11-27 DIAGNOSIS — G43.109 MIGRAINE WITH AURA AND WITHOUT STATUS MIGRAINOSUS, NOT INTRACTABLE: ICD-10-CM

## 2024-11-27 DIAGNOSIS — N76.0 ACUTE VAGINITIS: ICD-10-CM

## 2024-11-27 DIAGNOSIS — R41.3 MEMORY LOSS: ICD-10-CM

## 2024-11-27 PROCEDURE — 4010F ACE/ARB THERAPY RXD/TAKEN: CPT | Mod: CPTII,,, | Performed by: NURSE PRACTITIONER

## 2024-11-27 PROCEDURE — 82088 ASSAY OF ALDOSTERONE: CPT | Performed by: NURSE PRACTITIONER

## 2024-11-27 PROCEDURE — 3066F NEPHROPATHY DOC TX: CPT | Mod: CPTII,,, | Performed by: NURSE PRACTITIONER

## 2024-11-27 PROCEDURE — 36415 COLL VENOUS BLD VENIPUNCTURE: CPT | Mod: PN | Performed by: NURSE PRACTITIONER

## 2024-11-27 PROCEDURE — 1159F MED LIST DOCD IN RCRD: CPT | Mod: CPTII,,, | Performed by: NURSE PRACTITIONER

## 2024-11-27 PROCEDURE — 3061F NEG MICROALBUMINURIA REV: CPT | Mod: CPTII,,, | Performed by: NURSE PRACTITIONER

## 2024-11-27 PROCEDURE — 3074F SYST BP LT 130 MM HG: CPT | Mod: CPTII,,, | Performed by: NURSE PRACTITIONER

## 2024-11-27 PROCEDURE — 99999 PR PBB SHADOW E&M-EST. PATIENT-LVL V: CPT | Mod: PBBFAC,,, | Performed by: NURSE PRACTITIONER

## 2024-11-27 PROCEDURE — 99215 OFFICE O/P EST HI 40 MIN: CPT | Mod: PBBFAC,PN | Performed by: NURSE PRACTITIONER

## 2024-11-27 PROCEDURE — 3046F HEMOGLOBIN A1C LEVEL >9.0%: CPT | Mod: CPTII,,, | Performed by: NURSE PRACTITIONER

## 2024-11-27 PROCEDURE — 3008F BODY MASS INDEX DOCD: CPT | Mod: CPTII,,, | Performed by: NURSE PRACTITIONER

## 2024-11-27 PROCEDURE — 1160F RVW MEDS BY RX/DR IN RCRD: CPT | Mod: CPTII,,, | Performed by: NURSE PRACTITIONER

## 2024-11-27 PROCEDURE — 3078F DIAST BP <80 MM HG: CPT | Mod: CPTII,,, | Performed by: NURSE PRACTITIONER

## 2024-11-27 PROCEDURE — 99214 OFFICE O/P EST MOD 30 MIN: CPT | Mod: S$PBB,,, | Performed by: NURSE PRACTITIONER

## 2024-11-27 RX ORDER — FLUCONAZOLE 150 MG/1
150 TABLET ORAL DAILY
Qty: 3 TABLET | Refills: 0 | Status: SHIPPED | OUTPATIENT
Start: 2024-11-27 | End: 2024-12-06

## 2024-11-27 RX ORDER — METRONIDAZOLE 500 MG/1
500 TABLET ORAL EVERY 12 HOURS
Qty: 14 TABLET | Refills: 0 | Status: ON HOLD | OUTPATIENT
Start: 2024-11-27 | End: 2024-12-02 | Stop reason: HOSPADM

## 2024-11-29 ENCOUNTER — TELEPHONE (OUTPATIENT)
Dept: PSYCHIATRY | Facility: CLINIC | Age: 54
End: 2024-11-29
Payer: MEDICAID

## 2024-11-29 ENCOUNTER — HOSPITAL ENCOUNTER (EMERGENCY)
Facility: HOSPITAL | Age: 54
Discharge: ANOTHER HOSPITAL | End: 2024-11-30
Attending: EMERGENCY MEDICINE
Payer: MEDICAID

## 2024-11-29 DIAGNOSIS — R56.9 SEIZURES: ICD-10-CM

## 2024-11-29 DIAGNOSIS — S06.5XAA SUBDURAL HEMATOMA: Primary | ICD-10-CM

## 2024-11-29 LAB
ALBUMIN SERPL BCP-MCNC: 3.6 G/DL (ref 3.5–5.2)
ALP SERPL-CCNC: 89 U/L (ref 40–150)
ALT SERPL W/O P-5'-P-CCNC: 17 U/L (ref 10–44)
AMPHET+METHAMPHET UR QL: NEGATIVE
ANION GAP SERPL CALC-SCNC: 11 MMOL/L (ref 8–16)
APTT PPP: 27.7 SEC (ref 21–32)
AST SERPL-CCNC: 14 U/L (ref 10–40)
BARBITURATES UR QL SCN>200 NG/ML: NEGATIVE
BASOPHILS # BLD AUTO: 0.01 K/UL (ref 0–0.2)
BASOPHILS NFR BLD: 0.1 % (ref 0–1.9)
BENZODIAZ UR QL SCN>200 NG/ML: ABNORMAL
BILIRUB SERPL-MCNC: 0.2 MG/DL (ref 0.1–1)
BILIRUB UR QL STRIP: NEGATIVE
BUN SERPL-MCNC: 13 MG/DL (ref 6–20)
BZE UR QL SCN: NEGATIVE
CALCIUM SERPL-MCNC: 9.6 MG/DL (ref 8.7–10.5)
CANNABINOIDS UR QL SCN: NEGATIVE
CHLORIDE SERPL-SCNC: 104 MMOL/L (ref 95–110)
CK SERPL-CCNC: 63 U/L (ref 20–180)
CLARITY UR: CLEAR
CO2 SERPL-SCNC: 23 MMOL/L (ref 23–29)
COLOR UR: YELLOW
CREAT SERPL-MCNC: 0.7 MG/DL (ref 0.5–1.4)
CREAT UR-MCNC: 102.6 MG/DL (ref 15–325)
DIFFERENTIAL METHOD BLD: ABNORMAL
EOSINOPHIL # BLD AUTO: 0 K/UL (ref 0–0.5)
EOSINOPHIL NFR BLD: 0 % (ref 0–8)
ERYTHROCYTE [DISTWIDTH] IN BLOOD BY AUTOMATED COUNT: 15.7 % (ref 11.5–14.5)
EST. GFR  (NO RACE VARIABLE): >60 ML/MIN/1.73 M^2
ETHANOL SERPL-MCNC: <10 MG/DL
GLUCOSE SERPL-MCNC: 91 MG/DL (ref 70–110)
GLUCOSE UR QL STRIP: NEGATIVE
HCT VFR BLD AUTO: 33.4 % (ref 37–48.5)
HGB BLD-MCNC: 10.1 G/DL (ref 12–16)
HGB UR QL STRIP: NEGATIVE
IMM GRANULOCYTES # BLD AUTO: 0.06 K/UL (ref 0–0.04)
IMM GRANULOCYTES NFR BLD AUTO: 0.6 % (ref 0–0.5)
INR PPP: 1 (ref 0.8–1.2)
KETONES UR QL STRIP: NEGATIVE
LACTATE SERPL-SCNC: 1.4 MMOL/L (ref 0.5–2.2)
LEUKOCYTE ESTERASE UR QL STRIP: NEGATIVE
LYMPHOCYTES # BLD AUTO: 3.9 K/UL (ref 1–4.8)
LYMPHOCYTES NFR BLD: 39.6 % (ref 18–48)
MAGNESIUM SERPL-MCNC: 2 MG/DL (ref 1.6–2.6)
MCH RBC QN AUTO: 23.2 PG (ref 27–31)
MCHC RBC AUTO-ENTMCNC: 30.2 G/DL (ref 32–36)
MCV RBC AUTO: 77 FL (ref 82–98)
METHADONE UR QL SCN>300 NG/ML: NEGATIVE
MONOCYTES # BLD AUTO: 0.6 K/UL (ref 0.3–1)
MONOCYTES NFR BLD: 5.7 % (ref 4–15)
NEUTROPHILS # BLD AUTO: 5.3 K/UL (ref 1.8–7.7)
NEUTROPHILS NFR BLD: 54 % (ref 38–73)
NITRITE UR QL STRIP: NEGATIVE
NRBC BLD-RTO: 0 /100 WBC
OPIATES UR QL SCN: NEGATIVE
PCP UR QL SCN>25 NG/ML: NEGATIVE
PH UR STRIP: 6 [PH] (ref 5–8)
PLATELET # BLD AUTO: 402 K/UL (ref 150–450)
PMV BLD AUTO: 9.1 FL (ref 9.2–12.9)
POCT GLUCOSE: 99 MG/DL (ref 70–110)
POTASSIUM SERPL-SCNC: 4.5 MMOL/L (ref 3.5–5.1)
PROT SERPL-MCNC: 7.2 G/DL (ref 6–8.4)
PROT UR QL STRIP: NEGATIVE
PROTHROMBIN TIME: 10.7 SEC (ref 9–12.5)
RBC # BLD AUTO: 4.36 M/UL (ref 4–5.4)
SODIUM SERPL-SCNC: 138 MMOL/L (ref 136–145)
SP GR UR STRIP: 1.02 (ref 1–1.03)
TOXICOLOGY INFORMATION: ABNORMAL
URN SPEC COLLECT METH UR: NORMAL
UROBILINOGEN UR STRIP-ACNC: NEGATIVE EU/DL
WBC # BLD AUTO: 9.75 K/UL (ref 3.9–12.7)

## 2024-11-29 PROCEDURE — 83735 ASSAY OF MAGNESIUM: CPT | Performed by: EMERGENCY MEDICINE

## 2024-11-29 PROCEDURE — 85025 COMPLETE CBC W/AUTO DIFF WBC: CPT | Performed by: EMERGENCY MEDICINE

## 2024-11-29 PROCEDURE — 93010 ELECTROCARDIOGRAM REPORT: CPT | Mod: ,,, | Performed by: INTERNAL MEDICINE

## 2024-11-29 PROCEDURE — 96374 THER/PROPH/DIAG INJ IV PUSH: CPT

## 2024-11-29 PROCEDURE — 36415 COLL VENOUS BLD VENIPUNCTURE: CPT | Performed by: EMERGENCY MEDICINE

## 2024-11-29 PROCEDURE — 63600175 PHARM REV CODE 636 W HCPCS: Performed by: EMERGENCY MEDICINE

## 2024-11-29 PROCEDURE — 85610 PROTHROMBIN TIME: CPT | Performed by: EMERGENCY MEDICINE

## 2024-11-29 PROCEDURE — 80307 DRUG TEST PRSMV CHEM ANLYZR: CPT | Performed by: EMERGENCY MEDICINE

## 2024-11-29 PROCEDURE — 80053 COMPREHEN METABOLIC PANEL: CPT | Performed by: EMERGENCY MEDICINE

## 2024-11-29 PROCEDURE — 93005 ELECTROCARDIOGRAM TRACING: CPT

## 2024-11-29 PROCEDURE — 82550 ASSAY OF CK (CPK): CPT | Performed by: EMERGENCY MEDICINE

## 2024-11-29 PROCEDURE — 83605 ASSAY OF LACTIC ACID: CPT | Performed by: EMERGENCY MEDICINE

## 2024-11-29 PROCEDURE — 99291 CRITICAL CARE FIRST HOUR: CPT

## 2024-11-29 PROCEDURE — 81003 URINALYSIS AUTO W/O SCOPE: CPT | Performed by: EMERGENCY MEDICINE

## 2024-11-29 PROCEDURE — 82077 ASSAY SPEC XCP UR&BREATH IA: CPT | Performed by: EMERGENCY MEDICINE

## 2024-11-29 PROCEDURE — 25000003 PHARM REV CODE 250: Performed by: EMERGENCY MEDICINE

## 2024-11-29 PROCEDURE — 85730 THROMBOPLASTIN TIME PARTIAL: CPT | Performed by: EMERGENCY MEDICINE

## 2024-11-29 PROCEDURE — 96375 TX/PRO/DX INJ NEW DRUG ADDON: CPT

## 2024-11-29 RX ORDER — AMLODIPINE BESYLATE 5 MG/1
10 TABLET ORAL
Status: COMPLETED | OUTPATIENT
Start: 2024-11-29 | End: 2024-11-29

## 2024-11-29 RX ORDER — FENTANYL CITRATE 50 UG/ML
25 INJECTION, SOLUTION INTRAMUSCULAR; INTRAVENOUS
Status: COMPLETED | OUTPATIENT
Start: 2024-11-30 | End: 2024-11-30

## 2024-11-29 RX ORDER — LEVETIRACETAM 500 MG/5ML
1000 INJECTION, SOLUTION, CONCENTRATE INTRAVENOUS
Status: COMPLETED | OUTPATIENT
Start: 2024-11-29 | End: 2024-11-29

## 2024-11-29 RX ORDER — LORAZEPAM 2 MG/ML
2 INJECTION INTRAMUSCULAR
Status: COMPLETED | OUTPATIENT
Start: 2024-11-29 | End: 2024-11-29

## 2024-11-29 RX ORDER — ONDANSETRON HYDROCHLORIDE 2 MG/ML
4 INJECTION, SOLUTION INTRAVENOUS
Status: COMPLETED | OUTPATIENT
Start: 2024-11-30 | End: 2024-11-30

## 2024-11-29 RX ADMIN — LORAZEPAM 2 MG: 2 INJECTION INTRAMUSCULAR; INTRAVENOUS at 05:11

## 2024-11-29 RX ADMIN — AMLODIPINE BESYLATE 10 MG: 5 TABLET ORAL at 11:11

## 2024-11-29 RX ADMIN — LEVETIRACETAM 1000 MG: 500 INJECTION, SOLUTION INTRAVENOUS at 08:11

## 2024-11-29 NOTE — ED PROVIDER NOTES
SCRIBE #1 NOTE: I, Gaby Ashraf, am scribing for, and in the presence of, Ayush Ward DO. I have scribed the HPI and ROS.    SCRIBE #2 NOTE: I, Padmnii Villavicencio, am scribing for, and in the presence of,  Gregory Begum MD. I have scribed the remaining portions of the note not scribed by Scribe #1.      History     Chief Complaint   Patient presents with    Seizures     EMS reports sz x 2 days. Pt. Says she was dx with mild clonic sz 2 days ago. Not currently on sz. Meds.      Review of patient's allergies indicates:   Allergen Reactions    Codeine Itching    Hydromorphone Other (See Comments)     Can't wake up for long time if taken    Slow to wake up after surgery after receiving    Aleve [naproxen sodium]      Increases BP    Ibuprofen      Increases BP    Neuromuscular blockers, steroidal Hives     some    Pregabalin Itching    Latex, natural rubber Rash    Morphine Rash     itching    Norco [hydrocodone-acetaminophen] Itching, Rash and Hallucinations    Secobarbital sodium Rash     itching    Tylox [oxycodone-acetaminophen] Rash         History of Present Illness     HPI    11/29/2024, 4:36 PM  History obtained from the patient      History of Present Illness: Yolanda Betts is a 54 y.o. female patient with a PMHx of anemia, anxiety, dipolar 1 disorder, GERD, asthma, type 2 DM, HTN, bipolar type schizoaffective disorder, dyslipidemia, morbid obesity, HSV, ALEN, HLD, myoclonus, and seizures who presents to the Emergency Department for evaluation of seizures which onset gradually 2 days ago. Pt states she was dx with mild clonic seizures, but she does not take seizure medications and does not have a neurologist. Symptoms are constant and moderate in severity. No mitigating or exacerbating factors reported. Associated sxs include nausea. No prior Tx reported. No further complaints or concerns at this time.       Arrival mode: EMS    PCP: Robe Britton MD        Past Medical History:  Past  Medical History:   Diagnosis Date    Abnormal Pap smear of cervix     HPV genital warts    Anemia     Anxiety     Arthritis     Asthma 10/11/2016    Bipolar 1 disorder     Diabetes mellitus, type 2     Dyslipidemia associated with type 2 diabetes mellitus 2019    Dyspnea due to COVID-19 2024    General anesthetics causing adverse effect in therapeutic use     Genital warts     GERD (gastroesophageal reflux disease)     Herpes simplex virus (HSV) infection     Hyperlipidemia     Hypertension     Hypertension complicating diabetes 2019    Migraine with aura and without status migrainosus, not intractable 2016    Mild persistent asthma without complication 10/11/2016    Morbid obesity with body mass index (BMI) of 45.0 to 49.9 in adult 2017    Myoclonus     Obstructive sleep apnea     ALEN (obstructive sleep apnea)     2L per N/C q HS    Schizoaffective disorder, bipolar type 2019    Seasonal allergic rhinitis due to pollen 2019    Seizures     Type 2 diabetes mellitus with hyperglycemia, with long-term current use of insulin 2017    Type 2 diabetes mellitus with hyperglycemia, without long-term current use of insulin 2017       Past Surgical History:  Past Surgical History:   Procedure Laterality Date    abcess removed right back      ANGIOGRAM, CORONARY, WITH LEFT HEART CATHETERIZATION N/A 2024    Procedure: Angiogram, Coronary, with Left Heart Cath;  Surgeon: Jaimie Wills MD;  Location: [facility] CATH LAB;  Service: Cardiology;  Laterality: N/A;    BELT ABDOMINOPLASTY      BREAST SURGERY Bilateral     Reduction    CARPAL TUNNEL RELEASE Bilateral 2023    Procedure: RELEASE, CARPAL TUNNEL;  Surgeon: Neville Roth MD;  Location: [facility];  Service: Orthopedics;  Laterality: Bilateral;  bilateral carpal tunnel release     SECTION      X 2    COLONOSCOPY      COLONOSCOPY N/A 2018    Procedure: colonoscopy for iron deficiency  anemia;  Surgeon: Frankie Sanchez MD;  Location: [facility] ENDO;  Service: Endoscopy;  Laterality: N/A;    COLONOSCOPY N/A 2/28/2018    Procedure: COLONOSCOPY;  Surgeon: Frankie Sanchez MD;  Location: [facility] ENDO;  Service: Endoscopy;  Laterality: N/A;    COLONOSCOPY N/A 3/10/2023    Procedure: COLONOSCOPY;  Surgeon: Lalita Montes De Oca MD;  Location: [facility] ENDO;  Service: Endoscopy;  Laterality: N/A;    COLONOSCOPY N/A 4/4/2024    Procedure: COLONOSCOPY;  Surgeon: Tara Og MD;  Location: [facility] ENDO;  Service: Endoscopy;  Laterality: N/A;    EPIDURAL STEROID INJECTION INTO CERVICAL SPINE N/A 1/31/2023    Procedure: C6/7 IL ROCAEL RN IV Sedation;  Surgeon: Otto Phillips MD;  Location: [facility] PAIN MGT;  Service: Pain Management;  Laterality: N/A;    EPIDURAL STEROID INJECTION INTO CERVICAL SPINE N/A 1/30/2024    Procedure: C5/6 IL ROCAEL;  Surgeon: Otto Phillips MD;  Location: [facility] PAIN MGT;  Service: Pain Management;  Laterality: N/A;    ESOPHAGOGASTRODUODENOSCOPY N/A 3/10/2023    Procedure: EGD (ESOPHAGOGASTRODUODENOSCOPY);  Surgeon: Lalita Montes De Oca MD;  Location: [facility];  Service: Endoscopy;  Laterality: N/A;    HYSTERECTOMY      w/ BSO; hypermenorrhea    INJECTION OF ANESTHETIC AGENT AROUND MEDIAL BRANCH NERVES INNERVATING CERVICAL FACET JOINT Bilateral 12/19/2023    Procedure: Bilateral C5-7 MBB (diagnostic);  Surgeon: Otto Phillips MD;  Location: [facility] PAIN MGT;  Service: Pain Management;  Laterality: Bilateral;    MOUTH SURGERY  1996    OOPHORECTOMY      hyst/bso, hypermenorrhea    ROBOT-ASSISTED CHOLECYSTECTOMY USING DA DARI XI N/A 1/3/2020    Procedure: XI ROBOTIC CHOLECYSTECTOMY;  Surgeon: Damir Driscoll MD;  Location: [facility];  Service: General;  Laterality: N/A;    TOTAL REDUCTION MAMMOPLASTY      TUBAL LIGATION           Family History:  Family History   Problem Relation Name Age of Onset    Diabetes Mother      Hyperlipidemia Mother      Hypertension Mother      Asthma Mother       "COPD Mother      Glaucoma Mother      Thyroid disease Mother      Anesthesia problems Mother          "almost had a cardiac arrest" , blood clots    Hypertension Father      Hyperlipidemia Father      Cancer Father          Brain, lung, liver, kidney    No Known Problems Sister      Hypertension Brother      Alcohol abuse Brother      Heart disease Maternal Grandmother      Hyperlipidemia Maternal Grandmother      Hypertension Maternal Grandmother      Cataracts Maternal Grandmother      Diabetes Maternal Grandmother      Heart disease Maternal Grandfather      Hyperlipidemia Maternal Grandfather      Hypertension Maternal Grandfather      Glaucoma Maternal Grandfather      Cancer Maternal Grandfather      Cataracts Maternal Grandfather      Macular degeneration Maternal Grandfather      Diabetes Maternal Grandfather      Heart disease Paternal Grandmother      Hyperlipidemia Paternal Grandmother      Hypertension Paternal Grandmother      Cataracts Paternal Grandmother      Heart disease Paternal Grandfather      Hyperlipidemia Paternal Grandfather      Hypertension Paternal Grandfather      Cataracts Paternal Grandfather      Breast cancer Maternal Cousin      Breast cancer Maternal Cousin      Breast cancer Maternal Cousin      Breast cancer Maternal Cousin         Social History:  Social History     Tobacco Use    Smoking status: Never    Smokeless tobacco: Never   Substance and Sexual Activity    Alcohol use: Yes     Alcohol/week: 0.0 standard drinks of alcohol     Comment: socially  No alcohol 72h prior to sx    Drug use: No    Sexual activity: Yes     Partners: Male     Birth control/protection: Surgical     Comment: hyst        Review of Systems     Review of Systems   Gastrointestinal:  Positive for nausea.   Neurological:  Positive for seizures.      Physical Exam     Initial Vitals   BP Pulse Resp Temp SpO2   11/29/24 1535 11/29/24 1535 11/29/24 1535 11/29/24 1536 11/29/24 1535   136/81 (!) 113 18 98.9 °F " (37.2 °C) 98 %      MAP       --                 Physical Exam  Vitals reviewed.   Cardiovascular:      Rate and Rhythm: Normal rate and regular rhythm.      Heart sounds: No murmur heard.  Pulmonary:      Effort: Pulmonary effort is normal.      Breath sounds: No wheezing.   Abdominal:      Palpations: Abdomen is soft.      Tenderness: There is no abdominal tenderness.   Musculoskeletal:         General: No tenderness. Normal range of motion.   Skin:     General: Skin is warm and dry.      Findings: No rash.   Neurological:      Mental Status: She is alert and oriented to person, place, and time.      Sensory: No sensory deficit.      Comments: The patient is jerking around in the bed.  With these jerks her eyes remain open and she is able to answer questions.            ED Course   Critical Care    Date/Time: 11/29/2024 8:05 PM    Performed by: Gregory Begum Jr., MD  Authorized by: Ayush Ward DO  Direct patient critical care time: 15 minutes  Additional history critical care time: 15 minutes  Ordering / reviewing critical care time: 10 minutes  Documentation critical care time: 10 minutes  Consulting other physicians critical care time: 5 minutes  Consult with family critical care time: 5 minutes  Total critical care time (exclusive of procedural time) : 60 minutes  Critical care time was exclusive of separately billable procedures and treating other patients and teaching time.  Critical care was necessary to treat or prevent imminent or life-threatening deterioration of the following conditions: CNS failure or compromise (Seizures and Subdural Hematoma).  Critical care was time spent personally by me on the following activities: blood draw for specimens, development of treatment plan with patient or surrogate, discussions with consultants, interpretation of cardiac output measurements, evaluation of patient's response to treatment, examination of patient, obtaining history from patient or  "surrogate, ordering and review of laboratory studies, ordering and performing treatments and interventions, ordering and review of radiographic studies, pulse oximetry, re-evaluation of patient's condition and review of old charts.        ED Vital Signs:  Vitals:    11/29/24 1535 11/29/24 1536 11/29/24 1558 11/29/24 1630   BP: 136/81   138/78   Pulse: (!) 113   99   Resp: 18   20   Temp:  98.9 °F (37.2 °C)     TempSrc:  Axillary     SpO2: 98%   99%   Weight:   91.7 kg (202 lb 1.6 oz)    Height:        11/29/24 1639 11/29/24 1845 11/29/24 1932 11/29/24 2030   BP:  125/87 (!) 144/87 135/88   Pulse:  97 95 96   Resp:  18 19 (!) 22   Temp:       TempSrc:       SpO2:  99% 97% 98%   Weight:       Height: 4' 8" (1.422 m)          Abnormal Lab Results:  Labs Reviewed   CBC W/ AUTO DIFFERENTIAL - Abnormal       Result Value    WBC 9.75      RBC 4.36      Hemoglobin 10.1 (*)     Hematocrit 33.4 (*)     MCV 77 (*)     MCH 23.2 (*)     MCHC 30.2 (*)     RDW 15.7 (*)     Platelets 402      MPV 9.1 (*)     Immature Granulocytes 0.6 (*)     Gran # (ANC) 5.3      Immature Grans (Abs) 0.06 (*)     Lymph # 3.9      Mono # 0.6      Eos # 0.0      Baso # 0.01      nRBC 0      Gran % 54.0      Lymph % 39.6      Mono % 5.7      Eosinophil % 0.0      Basophil % 0.1      Differential Method Automated     DRUG SCREEN PANEL, URINE EMERGENCY - Abnormal    Benzodiazepines Presumptive Positive (*)     Methadone metabolites Negative      Cocaine (Metab.) Negative      Opiate Scrn, Ur Negative      Barbiturate Screen, Ur Negative      Amphetamine Screen, Ur Negative      THC Negative      Phencyclidine Negative      Creatinine, Urine 102.6      Toxicology Information SEE COMMENT      Narrative:     Specimen Source->Urine   COMPREHENSIVE METABOLIC PANEL    Sodium 138      Potassium 4.5      Chloride 104      CO2 23      Glucose 91      BUN 13      Creatinine 0.7      Calcium 9.6      Total Protein 7.2      Albumin 3.6      Total Bilirubin 0.2      " Alkaline Phosphatase 89      AST 14      ALT 17      eGFR >60      Anion Gap 11     LACTIC ACID, PLASMA    Lactate (Lactic Acid) 1.4     MAGNESIUM    Magnesium 2.0     URINALYSIS, REFLEX TO URINE CULTURE    Specimen UA Urine, Clean Catch      Color, UA Yellow      Appearance, UA Clear      pH, UA 6.0      Specific Gravity, UA 1.020      Protein, UA Negative      Glucose, UA Negative      Ketones, UA Negative      Bilirubin (UA) Negative      Occult Blood UA Negative      Nitrite, UA Negative      Urobilinogen, UA Negative      Leukocytes, UA Negative      Narrative:     Specimen Source->Urine   CK    CPK 63     ALCOHOL,MEDICAL (ETHANOL)    Alcohol, Serum <10     APTT    aPTT 27.7     PROTIME-INR    Prothrombin Time 10.7      INR 1.0     POCT GLUCOSE    POCT Glucose 99     POCT GLUCOSE MONITORING CONTINUOUS        All Lab Results:  Results for orders placed or performed during the hospital encounter of 11/29/24   CBC auto differential    Collection Time: 11/29/24  5:04 PM   Result Value Ref Range    WBC 9.75 3.90 - 12.70 K/uL    RBC 4.36 4.00 - 5.40 M/uL    Hemoglobin 10.1 (L) 12.0 - 16.0 g/dL    Hematocrit 33.4 (L) 37.0 - 48.5 %    MCV 77 (L) 82 - 98 fL    MCH 23.2 (L) 27.0 - 31.0 pg    MCHC 30.2 (L) 32.0 - 36.0 g/dL    RDW 15.7 (H) 11.5 - 14.5 %    Platelets 402 150 - 450 K/uL    MPV 9.1 (L) 9.2 - 12.9 fL    Immature Granulocytes 0.6 (H) 0.0 - 0.5 %    Gran # (ANC) 5.3 1.8 - 7.7 K/uL    Immature Grans (Abs) 0.06 (H) 0.00 - 0.04 K/uL    Lymph # 3.9 1.0 - 4.8 K/uL    Mono # 0.6 0.3 - 1.0 K/uL    Eos # 0.0 0.0 - 0.5 K/uL    Baso # 0.01 0.00 - 0.20 K/uL    nRBC 0 0 /100 WBC    Gran % 54.0 38.0 - 73.0 %    Lymph % 39.6 18.0 - 48.0 %    Mono % 5.7 4.0 - 15.0 %    Eosinophil % 0.0 0.0 - 8.0 %    Basophil % 0.1 0.0 - 1.9 %    Differential Method Automated    Comprehensive metabolic panel    Collection Time: 11/29/24  5:04 PM   Result Value Ref Range    Sodium 138 136 - 145 mmol/L    Potassium 4.5 3.5 - 5.1 mmol/L     Chloride 104 95 - 110 mmol/L    CO2 23 23 - 29 mmol/L    Glucose 91 70 - 110 mg/dL    BUN 13 6 - 20 mg/dL    Creatinine 0.7 0.5 - 1.4 mg/dL    Calcium 9.6 8.7 - 10.5 mg/dL    Total Protein 7.2 6.0 - 8.4 g/dL    Albumin 3.6 3.5 - 5.2 g/dL    Total Bilirubin 0.2 0.1 - 1.0 mg/dL    Alkaline Phosphatase 89 40 - 150 U/L    AST 14 10 - 40 U/L    ALT 17 10 - 44 U/L    eGFR >60 >60 mL/min/1.73 m^2    Anion Gap 11 8 - 16 mmol/L   Magnesium    Collection Time: 11/29/24  5:04 PM   Result Value Ref Range    Magnesium 2.0 1.6 - 2.6 mg/dL   CPK    Collection Time: 11/29/24  5:04 PM   Result Value Ref Range    CPK 63 20 - 180 U/L   Lactic acid, plasma    Collection Time: 11/29/24  5:10 PM   Result Value Ref Range    Lactate (Lactic Acid) 1.4 0.5 - 2.2 mmol/L   Ethanol    Collection Time: 11/29/24  5:10 PM   Result Value Ref Range    Alcohol, Serum <10 <10 mg/dL   POCT glucose    Collection Time: 11/29/24  6:16 PM   Result Value Ref Range    POCT Glucose 99 70 - 110 mg/dL   Urinalysis, Reflex to Urine Culture Urine, Clean Catch    Collection Time: 11/29/24  7:32 PM    Specimen: Urine, Clean Catch   Result Value Ref Range    Specimen UA Urine, Clean Catch     Color, UA Yellow Yellow, Straw, Milagros    Appearance, UA Clear Clear    pH, UA 6.0 5.0 - 8.0    Specific Gravity, UA 1.020 1.005 - 1.030    Protein, UA Negative Negative    Glucose, UA Negative Negative    Ketones, UA Negative Negative    Bilirubin (UA) Negative Negative    Occult Blood UA Negative Negative    Nitrite, UA Negative Negative    Urobilinogen, UA Negative <2.0 EU/dL    Leukocytes, UA Negative Negative   Drug screen panel, emergency    Collection Time: 11/29/24  7:32 PM   Result Value Ref Range    Benzodiazepines Presumptive Positive (A) Negative    Methadone metabolites Negative Negative    Cocaine (Metab.) Negative Negative    Opiate Scrn, Ur Negative Negative    Barbiturate Screen, Ur Negative Negative    Amphetamine Screen, Ur Negative Negative    THC Negative  Negative    Phencyclidine Negative Negative    Creatinine, Urine 102.6 15.0 - 325.0 mg/dL    Toxicology Information SEE COMMENT    APTT    Collection Time: 11/29/24  8:12 PM   Result Value Ref Range    aPTT 27.7 21.0 - 32.0 sec   Protime-INR    Collection Time: 11/29/24  8:12 PM   Result Value Ref Range    Prothrombin Time 10.7 9.0 - 12.5 sec    INR 1.0 0.8 - 1.2     *Note: Due to a large number of results and/or encounters for the requested time period, some results have not been displayed. A complete set of results can be found in Results Review.         Imaging Results:  Imaging Results              CT Head Without Contrast (Final result)  Result time 11/29/24 17:53:08      Final result by Shaq Doshi MD (11/29/24 17:53:08)                   Impression:      1.  Small right lateral convexity subdural hematoma measuring 3 mm in thickness.  Negative for midline shift.    2.  Negative for acute intracranial process otherwise.  Negative for other areas of hemorrhage, or skull fracture.    3.  Stable findings as noted above.    4.  A call report was made to Dr. Ward at 17:50 hours on November 29, 2024.    All CT scans at this facility are performed  using dose modulation techniques as appropriate to performed exam including the following:  automated exposure control; adjustment of mA and/or kV according to the patients size (this includes techniques or standardized protocols for targeted exams where dose is matched to indication/reason for exam: i.e. extremities or head);  iterative reconstruction technique.      Electronically signed by: Shaq Doshi MD  Date:    11/29/2024  Time:    17:53               Narrative:    EXAMINATION:  CT HEAD WITHOUT CONTRAST    CLINICAL HISTORY:  Seizure, new-onset, no history of trauma;    TECHNIQUE:  Axial images through the brain and posterior fossa were obtained without the use of IV contrast.  Sagittal and coronal reconstructions are provided for  review.    COMPARISON:  November 17, 2024    FINDINGS:  The ventricles are midline and the CSF spaces are normal. The gray-white matter junction is well preserved. Negative for intracranial vascular abnormalities.    There is a small amount of subdural blood involving the lateral right convexity measuring up to 3 mm in thickness.  No associated midline shift.  Negative for mass, mass effect, cerebral edema, other areas of hemorrhage or abnormal fluid collections.  Falx calcifications.    The skull and scalp are  intact.  Hypoplastic frontal sinuses.  Rightward nasal septal deviation.  The rest of the paranasal sinuses, mastoid air cells, middle ears and ear canals are clear. The globes are intact.                                       The EKG was ordered, reviewed, and independently interpreted by the ED provider.  Interpretation time: 18:01  Rate: 93 BPM  Rhythm: normal sinus rhythm  Interpretation: Cannot rule out Anterior infarct, age undetermined. No STEMI.         The Emergency Provider reviewed the vital signs and test results, which are outlined above.     ED Discussion     8:00 PM: Dr. Ward transfers care of patient to Dr. Begum pending lab results.    8:54 PM:  Initial consult was with Dr. Brewster with Neurosurgery.  He accepts the patient initially.  He was subsequently put in contact with the neuro critical care.  Consult with Dr. Pinto (Neuro critical care) at [facility] concerning pt. There are no  services, which the patient requires, offered at [facility] at this time. Dr. Pinto expresses understanding and will accept transfer for subdural hematoma.  Accepting Facility: [facility]  Accepting Physician: Dr. Pinto      ED Course as of 11/29/24 2120 Fri Nov 29, 2024   5431 CT Head Without Contrast  .  Small right lateral convexity subdural hematoma measuring 3 mm in thickness.  Negative for midline shift.     2.  Negative for acute intracranial process otherwise.   Negative for other areas of hemorrhage, or skull fracture.     3.  Stable findings as noted above.      [CD]   1910 POCT glucose  Within normal limits [CD]   1911 Lactic acid, plasma  Within normal limits [CD]   1911 CPK  Within normal limits [CD]   1911 Comprehensive metabolic panel  Within normal limits [CD]   1911 CBC auto differential(!)  Nonspecific chronic findings [CD]   1911 Magnesium  With a Deauville limits [CD]   1911 Ethanol  Within normal limits [CD]      ED Course User Index  [CD] Ayush Ward DO     Medical Decision Making  Differential diagnosis: Seizure, pseudo-seizure, intracranial hemorrhage, intracranial mass    Patient was evaluated history physical examination.  CT imaging shows subdural hematoma of unknown origin.  Work was otherwise benign.  Patient was being transferred to [facility] for further evaluation and treat    Amount and/or Complexity of Data Reviewed  External Data Reviewed: notes.  Labs: ordered. Decision-making details documented in ED Course.     Details: Positive benzos on UDS.  Coags are normal UA negative lactate glucose alcohol CPK magnesium CMP are all normal.  Patient was hemoglobin 10.1 hematocrit of 33.4 and MCV of 77 normal white count  Radiology: ordered. Decision-making details documented in ED Course.     Details: CT head shows small 3 mm subdural hematoma  ECG/medicine tests: ordered and independent interpretation performed. Decision-making details documented in ED Course.     Details: No STEMI  Discussion of management or test interpretation with external provider(s): Discussed the case with Neurosurgery in [facility] Dr. Brewster who accepts the patient in transfer.  He was subsequently put in touch with neuro critical Care.  Dr. Oglesby he was the accepting physician with neuro critical care    Risk  OTC drugs.  Prescription drug management.  Parenteral controlled substances.  Drug therapy requiring intensive monitoring for  toxicity.  Decision regarding hospitalization.    Critical Care  Total time providing critical care: 60 minutes                ED Medication(s):  Medications   LORazepam injection 2 mg (2 mg Intravenous Given 11/29/24 1704)   levETIRAcetam injection 1,000 mg (1,000 mg Intravenous Given 11/29/24 2020)       New Prescriptions    No medications on file               Scribe Attestation:   Scribe #1: I performed the above scribed service and the documentation accurately describes the services I performed. I attest to the accuracy of the note.     Attending:   Physician Attestation Statement for Scribe #1: I, Ayush Ward DO, personally performed the services described in this documentation, as scribed by Gaby Ashraf, in my presence, and it is both accurate and complete.       Scribe Attestation:   Scribe #2: I performed the above scribed service and the documentation accurately describes the services I performed. I attest to the accuracy of the note.    Attending Attestation:           Physician Attestation for Scribe:    Physician Attestation Statement for Scribe #2: I, Gregory Begum MD, reviewed documentation, as scribed by Padmini Villavicencio in my presence, and it is both accurate and complete. I also acknowledge and confirm the content of the note done by Losibe #1.           Clinical Impression       ICD-10-CM ICD-9-CM   1. Subdural hematoma  S06.5XAA 432.1   2. Seizures  R56.9 780.39       Disposition:   Disposition: Transferred  Condition: Stable         Gregory Begum Jr., MD  11/29/24 2120

## 2024-11-29 NOTE — TELEPHONE ENCOUNTER
----- Message from Angie sent at 11/29/2024 10:38 AM CST -----  Contact: Yolanda  Mrs. Guerrero needs a call back at [phone] in regards to a missed call she received on today.    Thanks

## 2024-11-30 ENCOUNTER — HOSPITAL ENCOUNTER (INPATIENT)
Facility: HOSPITAL | Age: 54
LOS: 2 days | Discharge: HOME | DRG: 065 | End: 2024-12-02
Attending: PSYCHIATRY & NEUROLOGY | Admitting: PSYCHIATRY & NEUROLOGY
Payer: MEDICAID

## 2024-11-30 VITALS
DIASTOLIC BLOOD PRESSURE: 73 MMHG | HEART RATE: 95 BPM | BODY MASS INDEX: 45.47 KG/M2 | SYSTOLIC BLOOD PRESSURE: 151 MMHG | HEIGHT: 56 IN | WEIGHT: 202.13 LBS | TEMPERATURE: 99 F | RESPIRATION RATE: 18 BRPM | OXYGEN SATURATION: 98 %

## 2024-11-30 DIAGNOSIS — M54.42 CHRONIC BILATERAL LOW BACK PAIN WITH BILATERAL SCIATICA: ICD-10-CM

## 2024-11-30 DIAGNOSIS — F31.5: ICD-10-CM

## 2024-11-30 DIAGNOSIS — E11.65 TYPE 2 DIABETES MELLITUS WITH HYPERGLYCEMIA, WITH LONG-TERM CURRENT USE OF INSULIN: Chronic | ICD-10-CM

## 2024-11-30 DIAGNOSIS — Z79.4 TYPE 2 DIABETES MELLITUS WITH HYPERGLYCEMIA, WITH LONG-TERM CURRENT USE OF INSULIN: Chronic | ICD-10-CM

## 2024-11-30 DIAGNOSIS — S06.5XAA SDH (SUBDURAL HEMATOMA): ICD-10-CM

## 2024-11-30 DIAGNOSIS — G89.29 CHRONIC BILATERAL LOW BACK PAIN WITH BILATERAL SCIATICA: ICD-10-CM

## 2024-11-30 DIAGNOSIS — S06.5XAA SUBDURAL HEMATOMA: ICD-10-CM

## 2024-11-30 DIAGNOSIS — R25.0 ABNORMAL HEAD MOVEMENTS: Primary | ICD-10-CM

## 2024-11-30 DIAGNOSIS — M54.41 CHRONIC BILATERAL LOW BACK PAIN WITH BILATERAL SCIATICA: ICD-10-CM

## 2024-11-30 LAB
ABO + RH BLD: NORMAL
ALBUMIN SERPL BCP-MCNC: 3.2 G/DL (ref 3.5–5.2)
ALP SERPL-CCNC: 88 U/L (ref 40–150)
ALT SERPL W/O P-5'-P-CCNC: 19 U/L (ref 10–44)
ANION GAP SERPL CALC-SCNC: 7 MMOL/L (ref 8–16)
APTT PPP: 28 SEC (ref 21–32)
AST SERPL-CCNC: 16 U/L (ref 10–40)
BASOPHILS # BLD AUTO: 0.01 K/UL (ref 0–0.2)
BASOPHILS NFR BLD: 0.1 % (ref 0–1.9)
BILIRUB SERPL-MCNC: 0.3 MG/DL (ref 0.1–1)
BILIRUB UR QL STRIP: NEGATIVE
BLD GP AB SCN CELLS X3 SERPL QL: NORMAL
BNP SERPL-MCNC: <10 PG/ML (ref 0–99)
BUN SERPL-MCNC: 11 MG/DL (ref 6–20)
CALCIUM SERPL-MCNC: 9.4 MG/DL (ref 8.7–10.5)
CHLORIDE SERPL-SCNC: 104 MMOL/L (ref 95–110)
CHOLEST SERPL-MCNC: 179 MG/DL (ref 120–199)
CHOLEST/HDLC SERPL: 3.9 {RATIO} (ref 2–5)
CLARITY UR REFRACT.AUTO: CLEAR
CO2 SERPL-SCNC: 24 MMOL/L (ref 23–29)
COLOR UR AUTO: YELLOW
CREAT SERPL-MCNC: 0.7 MG/DL (ref 0.5–1.4)
DIFFERENTIAL METHOD BLD: ABNORMAL
EOSINOPHIL # BLD AUTO: 0 K/UL (ref 0–0.5)
EOSINOPHIL NFR BLD: 0 % (ref 0–8)
ERYTHROCYTE [DISTWIDTH] IN BLOOD BY AUTOMATED COUNT: 16.1 % (ref 11.5–14.5)
EST. GFR  (NO RACE VARIABLE): >60 ML/MIN/1.73 M^2
GLUCOSE SERPL-MCNC: 138 MG/DL (ref 70–110)
GLUCOSE UR QL STRIP: NEGATIVE
HCT VFR BLD AUTO: 31 % (ref 37–48.5)
HDLC SERPL-MCNC: 46 MG/DL (ref 40–75)
HDLC SERPL: 25.7 % (ref 20–50)
HGB BLD-MCNC: 9.4 G/DL (ref 12–16)
HGB UR QL STRIP: NEGATIVE
IMM GRANULOCYTES # BLD AUTO: 0.06 K/UL (ref 0–0.04)
IMM GRANULOCYTES NFR BLD AUTO: 0.5 % (ref 0–0.5)
INR PPP: 1 (ref 0.8–1.2)
KETONES UR QL STRIP: NEGATIVE
LDLC SERPL CALC-MCNC: 91.6 MG/DL (ref 63–159)
LEUKOCYTE ESTERASE UR QL STRIP: NEGATIVE
LYMPHOCYTES # BLD AUTO: 2.8 K/UL (ref 1–4.8)
LYMPHOCYTES NFR BLD: 25 % (ref 18–48)
MAGNESIUM SERPL-MCNC: 1.9 MG/DL (ref 1.6–2.6)
MCH RBC QN AUTO: 23.5 PG (ref 27–31)
MCHC RBC AUTO-ENTMCNC: 30.3 G/DL (ref 32–36)
MCV RBC AUTO: 78 FL (ref 82–98)
MONOCYTES # BLD AUTO: 0.6 K/UL (ref 0.3–1)
MONOCYTES NFR BLD: 5.6 % (ref 4–15)
NEUTROPHILS # BLD AUTO: 7.7 K/UL (ref 1.8–7.7)
NEUTROPHILS NFR BLD: 68.8 % (ref 38–73)
NITRITE UR QL STRIP: NEGATIVE
NONHDLC SERPL-MCNC: 133 MG/DL
NRBC BLD-RTO: 0 /100 WBC
PH UR STRIP: 6 [PH] (ref 5–8)
PHOSPHATE SERPL-MCNC: 3.8 MG/DL (ref 2.7–4.5)
PLATELET # BLD AUTO: 398 K/UL (ref 150–450)
PMV BLD AUTO: 9.4 FL (ref 9.2–12.9)
POCT GLUCOSE: 123 MG/DL (ref 70–110)
POCT GLUCOSE: 142 MG/DL (ref 70–110)
POCT GLUCOSE: 175 MG/DL (ref 70–110)
POCT GLUCOSE: 212 MG/DL (ref 70–110)
POCT GLUCOSE: 249 MG/DL (ref 70–110)
POCT GLUCOSE: 96 MG/DL (ref 70–110)
POTASSIUM SERPL-SCNC: 4.3 MMOL/L (ref 3.5–5.1)
PROT SERPL-MCNC: 6.7 G/DL (ref 6–8.4)
PROT UR QL STRIP: NEGATIVE
PROTHROMBIN TIME: 10.8 SEC (ref 9–12.5)
RBC # BLD AUTO: 4 M/UL (ref 4–5.4)
SODIUM SERPL-SCNC: 135 MMOL/L (ref 136–145)
SP GR UR STRIP: 1.01 (ref 1–1.03)
SPECIMEN OUTDATE: NORMAL
TRIGL SERPL-MCNC: 207 MG/DL (ref 30–150)
TSH SERPL DL<=0.005 MIU/L-ACNC: 1.98 UIU/ML (ref 0.4–4)
URN SPEC COLLECT METH UR: NORMAL
WBC # BLD AUTO: 11.15 K/UL (ref 3.9–12.7)

## 2024-11-30 PROCEDURE — 94761 N-INVAS EAR/PLS OXIMETRY MLT: CPT

## 2024-11-30 PROCEDURE — 83735 ASSAY OF MAGNESIUM: CPT

## 2024-11-30 PROCEDURE — 99291 CRITICAL CARE FIRST HOUR: CPT | Mod: ,,,

## 2024-11-30 PROCEDURE — 25000003 PHARM REV CODE 250: Performed by: PSYCHIATRY & NEUROLOGY

## 2024-11-30 PROCEDURE — 63600175 PHARM REV CODE 636 W HCPCS

## 2024-11-30 PROCEDURE — 97530 THERAPEUTIC ACTIVITIES: CPT

## 2024-11-30 PROCEDURE — 25000003 PHARM REV CODE 250

## 2024-11-30 PROCEDURE — 85730 THROMBOPLASTIN TIME PARTIAL: CPT

## 2024-11-30 PROCEDURE — 51701 INSERT BLADDER CATHETER: CPT

## 2024-11-30 PROCEDURE — 83880 ASSAY OF NATRIURETIC PEPTIDE: CPT

## 2024-11-30 PROCEDURE — 95700 EEG CONT REC W/VID EEG TECH: CPT

## 2024-11-30 PROCEDURE — 81003 URINALYSIS AUTO W/O SCOPE: CPT

## 2024-11-30 PROCEDURE — 92610 EVALUATE SWALLOWING FUNCTION: CPT

## 2024-11-30 PROCEDURE — 95720 EEG PHY/QHP EA INCR W/VEEG: CPT | Mod: ,,, | Performed by: PSYCHIATRY & NEUROLOGY

## 2024-11-30 PROCEDURE — 85610 PROTHROMBIN TIME: CPT

## 2024-11-30 PROCEDURE — 80053 COMPREHEN METABOLIC PANEL: CPT

## 2024-11-30 PROCEDURE — 95714 VEEG EA 12-26 HR UNMNTR: CPT

## 2024-11-30 PROCEDURE — 84100 ASSAY OF PHOSPHORUS: CPT

## 2024-11-30 PROCEDURE — 63600175 PHARM REV CODE 636 W HCPCS: Performed by: EMERGENCY MEDICINE

## 2024-11-30 PROCEDURE — 86901 BLOOD TYPING SEROLOGIC RH(D): CPT

## 2024-11-30 PROCEDURE — 97165 OT EVAL LOW COMPLEX 30 MIN: CPT

## 2024-11-30 PROCEDURE — 20000000 HC ICU ROOM

## 2024-11-30 PROCEDURE — 80061 LIPID PANEL: CPT

## 2024-11-30 PROCEDURE — 51798 US URINE CAPACITY MEASURE: CPT

## 2024-11-30 PROCEDURE — 85025 COMPLETE CBC W/AUTO DIFF WBC: CPT

## 2024-11-30 PROCEDURE — 84443 ASSAY THYROID STIM HORMONE: CPT

## 2024-11-30 RX ORDER — INSULIN GLARGINE 100 [IU]/ML
25 INJECTION, SOLUTION SUBCUTANEOUS DAILY
Status: DISCONTINUED | OUTPATIENT
Start: 2024-11-30 | End: 2024-12-02 | Stop reason: HOSPADM

## 2024-11-30 RX ORDER — SODIUM CHLORIDE 0.9 % (FLUSH) 0.9 %
10 SYRINGE (ML) INJECTION
Status: DISCONTINUED | OUTPATIENT
Start: 2024-11-30 | End: 2024-12-02 | Stop reason: HOSPADM

## 2024-11-30 RX ORDER — LEVETIRACETAM 500 MG/5ML
500 INJECTION, SOLUTION, CONCENTRATE INTRAVENOUS EVERY 12 HOURS
Status: DISCONTINUED | OUTPATIENT
Start: 2024-11-30 | End: 2024-12-02 | Stop reason: HOSPADM

## 2024-11-30 RX ORDER — ALPRAZOLAM 0.25 MG/1
0.5 TABLET ORAL 2 TIMES DAILY PRN
Status: DISCONTINUED | OUTPATIENT
Start: 2024-11-30 | End: 2024-12-02 | Stop reason: HOSPADM

## 2024-11-30 RX ORDER — FAMOTIDINE 20 MG/1
20 TABLET, FILM COATED ORAL 2 TIMES DAILY PRN
Status: DISCONTINUED | OUTPATIENT
Start: 2024-11-30 | End: 2024-12-02 | Stop reason: HOSPADM

## 2024-11-30 RX ORDER — LAMOTRIGINE 100 MG/1
200 TABLET ORAL 2 TIMES DAILY
Status: DISCONTINUED | OUTPATIENT
Start: 2024-11-30 | End: 2024-12-02 | Stop reason: HOSPADM

## 2024-11-30 RX ORDER — METOPROLOL SUCCINATE 50 MG/1
50 TABLET, EXTENDED RELEASE ORAL EVERY MORNING
Status: DISCONTINUED | OUTPATIENT
Start: 2024-11-30 | End: 2024-12-02 | Stop reason: HOSPADM

## 2024-11-30 RX ORDER — DULOXETIN HYDROCHLORIDE 30 MG/1
60 CAPSULE, DELAYED RELEASE ORAL 2 TIMES DAILY
Status: DISCONTINUED | OUTPATIENT
Start: 2024-11-30 | End: 2024-12-02 | Stop reason: HOSPADM

## 2024-11-30 RX ORDER — INSULIN ASPART 100 [IU]/ML
0-10 INJECTION, SOLUTION INTRAVENOUS; SUBCUTANEOUS
Status: DISCONTINUED | OUTPATIENT
Start: 2024-11-30 | End: 2024-11-30

## 2024-11-30 RX ORDER — GLUCAGON 1 MG
1 KIT INJECTION
Status: DISCONTINUED | OUTPATIENT
Start: 2024-11-30 | End: 2024-12-02 | Stop reason: HOSPADM

## 2024-11-30 RX ORDER — IBUPROFEN 200 MG
16 TABLET ORAL
Status: DISCONTINUED | OUTPATIENT
Start: 2024-11-30 | End: 2024-11-30

## 2024-11-30 RX ORDER — CLONIDINE HYDROCHLORIDE 0.1 MG/1
0.1 TABLET ORAL 2 TIMES DAILY
Status: DISCONTINUED | OUTPATIENT
Start: 2024-11-30 | End: 2024-11-30

## 2024-11-30 RX ORDER — ATORVASTATIN CALCIUM 40 MG/1
40 TABLET, FILM COATED ORAL DAILY
Status: DISCONTINUED | OUTPATIENT
Start: 2024-11-30 | End: 2024-11-30

## 2024-11-30 RX ORDER — MIDAZOLAM HYDROCHLORIDE 1 MG/ML
2 INJECTION, SOLUTION INTRAMUSCULAR; INTRAVENOUS ONCE
Status: DISCONTINUED | OUTPATIENT
Start: 2024-11-30 | End: 2024-12-02 | Stop reason: HOSPADM

## 2024-11-30 RX ORDER — ALBUTEROL SULFATE 90 UG/1
2 INHALANT RESPIRATORY (INHALATION) EVERY 6 HOURS PRN
Status: DISCONTINUED | OUTPATIENT
Start: 2024-11-30 | End: 2024-12-02 | Stop reason: HOSPADM

## 2024-11-30 RX ORDER — IBUPROFEN 200 MG
24 TABLET ORAL
Status: DISCONTINUED | OUTPATIENT
Start: 2024-11-30 | End: 2024-12-02 | Stop reason: HOSPADM

## 2024-11-30 RX ORDER — POLYETHYLENE GLYCOL 3350 17 G/17G
17 POWDER, FOR SOLUTION ORAL DAILY
Status: DISCONTINUED | OUTPATIENT
Start: 2024-11-30 | End: 2024-12-02

## 2024-11-30 RX ORDER — BISACODYL 10 MG/1
10 SUPPOSITORY RECTAL DAILY PRN
Status: DISCONTINUED | OUTPATIENT
Start: 2024-11-30 | End: 2024-12-02 | Stop reason: HOSPADM

## 2024-11-30 RX ORDER — LANOLIN ALCOHOL/MO/W.PET/CERES
800 CREAM (GRAM) TOPICAL
Status: DISCONTINUED | OUTPATIENT
Start: 2024-11-30 | End: 2024-12-01

## 2024-11-30 RX ORDER — IBUPROFEN 200 MG
16 TABLET ORAL
Status: DISCONTINUED | OUTPATIENT
Start: 2024-11-30 | End: 2024-12-02 | Stop reason: HOSPADM

## 2024-11-30 RX ORDER — SODIUM,POTASSIUM PHOSPHATES 280-250MG
2 POWDER IN PACKET (EA) ORAL
Status: DISCONTINUED | OUTPATIENT
Start: 2024-11-30 | End: 2024-12-01

## 2024-11-30 RX ORDER — HYDRALAZINE HYDROCHLORIDE 20 MG/ML
10 INJECTION INTRAMUSCULAR; INTRAVENOUS EVERY 6 HOURS PRN
Status: DISCONTINUED | OUTPATIENT
Start: 2024-11-30 | End: 2024-12-02 | Stop reason: HOSPADM

## 2024-11-30 RX ORDER — INSULIN ASPART 100 [IU]/ML
0-10 INJECTION, SOLUTION INTRAVENOUS; SUBCUTANEOUS
Status: DISCONTINUED | OUTPATIENT
Start: 2024-11-30 | End: 2024-12-02 | Stop reason: HOSPADM

## 2024-11-30 RX ORDER — HYDRALAZINE HYDROCHLORIDE 20 MG/ML
10 INJECTION INTRAMUSCULAR; INTRAVENOUS EVERY 6 HOURS PRN
Status: DISCONTINUED | OUTPATIENT
Start: 2024-11-30 | End: 2024-11-30

## 2024-11-30 RX ORDER — LIDOCAINE 50 MG/G
1 PATCH TOPICAL
Status: DISCONTINUED | OUTPATIENT
Start: 2024-11-30 | End: 2024-12-02 | Stop reason: HOSPADM

## 2024-11-30 RX ORDER — ATORVASTATIN CALCIUM 40 MG/1
40 TABLET, FILM COATED ORAL NIGHTLY
Status: DISCONTINUED | OUTPATIENT
Start: 2024-11-30 | End: 2024-12-02 | Stop reason: HOSPADM

## 2024-11-30 RX ORDER — IBUPROFEN 200 MG
24 TABLET ORAL
Status: DISCONTINUED | OUTPATIENT
Start: 2024-11-30 | End: 2024-11-30

## 2024-11-30 RX ORDER — GLUCAGON 1 MG
1 KIT INJECTION
Status: DISCONTINUED | OUTPATIENT
Start: 2024-11-30 | End: 2024-11-30

## 2024-11-30 RX ORDER — MUPIROCIN 20 MG/G
OINTMENT TOPICAL 2 TIMES DAILY
Status: DISCONTINUED | OUTPATIENT
Start: 2024-11-30 | End: 2024-12-02 | Stop reason: HOSPADM

## 2024-11-30 RX ORDER — ONDANSETRON HYDROCHLORIDE 2 MG/ML
4 INJECTION, SOLUTION INTRAVENOUS EVERY 8 HOURS PRN
Status: DISCONTINUED | OUTPATIENT
Start: 2024-11-30 | End: 2024-12-02 | Stop reason: HOSPADM

## 2024-11-30 RX ORDER — ALPRAZOLAM 0.5 MG/1
0.5 TABLET ORAL ONCE
Status: COMPLETED | OUTPATIENT
Start: 2024-11-30 | End: 2024-11-30

## 2024-11-30 RX ORDER — INSULIN ASPART 100 [IU]/ML
10 INJECTION, SOLUTION INTRAVENOUS; SUBCUTANEOUS
Status: DISCONTINUED | OUTPATIENT
Start: 2024-11-30 | End: 2024-12-02 | Stop reason: HOSPADM

## 2024-11-30 RX ORDER — AMOXICILLIN 250 MG
1 CAPSULE ORAL 2 TIMES DAILY
Status: DISCONTINUED | OUTPATIENT
Start: 2024-11-30 | End: 2024-12-02 | Stop reason: HOSPADM

## 2024-11-30 RX ADMIN — LEVETIRACETAM 500 MG: 100 INJECTION, SOLUTION INTRAVENOUS at 10:11

## 2024-11-30 RX ADMIN — INSULIN GLARGINE 25 UNITS: 100 INJECTION, SOLUTION SUBCUTANEOUS at 04:11

## 2024-11-30 RX ADMIN — FENTANYL CITRATE 25 MCG: 50 INJECTION INTRAMUSCULAR; INTRAVENOUS at 12:11

## 2024-11-30 RX ADMIN — POLYETHYLENE GLYCOL 3350 17 G: 17 POWDER, FOR SOLUTION ORAL at 10:11

## 2024-11-30 RX ADMIN — SENNOSIDES AND DOCUSATE SODIUM 1 TABLET: 50; 8.6 TABLET ORAL at 08:11

## 2024-11-30 RX ADMIN — DULOXETINE HYDROCHLORIDE 60 MG: 30 CAPSULE, DELAYED RELEASE ORAL at 08:11

## 2024-11-30 RX ADMIN — SENNOSIDES AND DOCUSATE SODIUM 1 TABLET: 50; 8.6 TABLET ORAL at 10:11

## 2024-11-30 RX ADMIN — INSULIN ASPART 2 UNITS: 100 INJECTION, SOLUTION INTRAVENOUS; SUBCUTANEOUS at 09:11

## 2024-11-30 RX ADMIN — INSULIN ASPART 10 UNITS: 100 INJECTION, SOLUTION INTRAVENOUS; SUBCUTANEOUS at 05:11

## 2024-11-30 RX ADMIN — ALPRAZOLAM 0.5 MG: 0.5 TABLET ORAL at 03:11

## 2024-11-30 RX ADMIN — ATORVASTATIN CALCIUM 40 MG: 40 TABLET, FILM COATED ORAL at 08:11

## 2024-11-30 RX ADMIN — MUPIROCIN: 20 OINTMENT TOPICAL at 08:11

## 2024-11-30 RX ADMIN — LEVETIRACETAM 500 MG: 100 INJECTION, SOLUTION INTRAVENOUS at 08:11

## 2024-11-30 RX ADMIN — LAMOTRIGINE 200 MG: 100 TABLET ORAL at 08:11

## 2024-11-30 RX ADMIN — DULOXETINE HYDROCHLORIDE 60 MG: 30 CAPSULE, DELAYED RELEASE ORAL at 10:11

## 2024-11-30 RX ADMIN — ONDANSETRON 4 MG: 2 INJECTION INTRAMUSCULAR; INTRAVENOUS at 02:11

## 2024-11-30 RX ADMIN — MUPIROCIN: 20 OINTMENT TOPICAL at 10:11

## 2024-11-30 RX ADMIN — LAMOTRIGINE 200 MG: 100 TABLET ORAL at 10:11

## 2024-11-30 RX ADMIN — LIDOCAINE 1 PATCH: 50 PATCH CUTANEOUS at 05:11

## 2024-11-30 RX ADMIN — ONDANSETRON 4 MG: 2 INJECTION INTRAMUSCULAR; INTRAVENOUS at 12:11

## 2024-11-30 RX ADMIN — METOPROLOL SUCCINATE 50 MG: 25 TABLET, EXTENDED RELEASE ORAL at 06:11

## 2024-11-30 RX ADMIN — ALPRAZOLAM 0.5 MG: 0.25 TABLET ORAL at 11:11

## 2024-11-30 NOTE — ASSESSMENT & PLAN NOTE
Yolanda Betts is a 54 y.o. female patient with a PMHx of anemia, anxiety, dipolar 1 disorder, GERD, asthma, type 2 DM, HTN, bipolar type schizoaffective disorder, dyslipidemia, morbid obesity, HSV, ALEN, HLD, myoclonus, and seizures, presents to hospital with self reported seizures, possibly pseudoseizures, consulted to NSGY for SDH.       CTH with very small, right focal SDH 3 mm in thickness  CTH repeat stable    - ICU status  - Q1 hour neurochecks  - SBP < 160  - EEG per ICU  - AEDs per ICU  - OK for SQH

## 2024-11-30 NOTE — HPI
Yolanda Betts is a 54 y.o. female patient with a PMHx of anemia, anxiety, dipolar 1 disorder, GERD, asthma, type 2 DM, HTN, bipolar type schizoaffective disorder, dyslipidemia, morbid obesity, HSV, ALEN, HLD, myoclonus, and seizures, presents to hospital with self reported seizures, consulted to NSGY for SDH. Pt reports shaking episodes started 2 days ago. Her whole body and head shake. She does not know how long the episodes last. She does not lose consciousness during and she is able to participate in interview during the episode. She denies falling and hitting her head

## 2024-11-30 NOTE — PT/OT/SLP EVAL
Speech Language Pathology Evaluation  Bedside Swallow    Patient Name:  Yolanda Betts   MRN:  [id number]  Admitting Diagnosis: SDH (subdural hematoma)    Recommendations:                 General Recommendations:  Cognitive-linguistic evaluation  Diet recommendations:  Regular Diet - IDDSI Level 7, Thin liquids - IDDSI Level 0   Aspiration Precautions: Standard aspiration precautions   General Precautions: Standard, fall  Communication strategies:  none    Assessment:     Yolanda Betts is a 54 y.o. female with an SLP diagnosis of  functional swallow .  Recommend a regular diet with thin liquids. ST to f/u to complete cognitive communication evaluation to determine needs.     History:     Past Medical History:   Diagnosis Date    Abnormal Pap smear of cervix     HPV genital warts    Anemia     Anxiety     Arthritis     Asthma 10/11/2016    Bipolar 1 disorder     Diabetes mellitus, type 2     Dyslipidemia associated with type 2 diabetes mellitus 05/13/2019    Dyspnea due to COVID-19 09/12/2024    General anesthetics causing adverse effect in therapeutic use     Genital warts     GERD (gastroesophageal reflux disease)     Herpes simplex virus (HSV) infection     Hyperlipidemia     Hypertension     Hypertension complicating diabetes 05/05/2019    Migraine with aura and without status migrainosus, not intractable 03/21/2016    Mild persistent asthma without complication 10/11/2016    Morbid obesity with body mass index (BMI) of 45.0 to 49.9 in adult 08/03/2017    Myoclonus     Obstructive sleep apnea     ALEN (obstructive sleep apnea)     2L per N/C q HS    Schizoaffective disorder, bipolar type 04/25/2019    Seasonal allergic rhinitis due to pollen 05/13/2019    Seizures     Type 2 diabetes mellitus with hyperglycemia, with long-term current use of insulin 12/21/2017    Type 2 diabetes mellitus with hyperglycemia, without long-term current use of insulin 12/21/2017       Past Surgical History:   Procedure  Laterality Date    abcess removed right back      ANGIOGRAM, CORONARY, WITH LEFT HEART CATHETERIZATION N/A 2024    Procedure: Angiogram, Coronary, with Left Heart Cath;  Surgeon: Jaimie Wills MD;  Location: [facility] CATH LAB;  Service: Cardiology;  Laterality: N/A;    BELT ABDOMINOPLASTY      BREAST SURGERY Bilateral     Reduction    CARPAL TUNNEL RELEASE Bilateral 2023    Procedure: RELEASE, CARPAL TUNNEL;  Surgeon: Neville Roth MD;  Location: [facility] OR;  Service: Orthopedics;  Laterality: Bilateral;  bilateral carpal tunnel release     SECTION      X 2    COLONOSCOPY      COLONOSCOPY N/A 2018    Procedure: colonoscopy for iron deficiency anemia;  Surgeon: Frankie Sanchez MD;  Location: [facility];  Service: Endoscopy;  Laterality: N/A;    COLONOSCOPY N/A 2018    Procedure: COLONOSCOPY;  Surgeon: Frankie Sanchez MD;  Location: [facility];  Service: Endoscopy;  Laterality: N/A;    COLONOSCOPY N/A 3/10/2023    Procedure: COLONOSCOPY;  Surgeon: Lalita Montes De Oca MD;  Location: [facility];  Service: Endoscopy;  Laterality: N/A;    COLONOSCOPY N/A 2024    Procedure: COLONOSCOPY;  Surgeon: Tara Og MD;  Location: [facility];  Service: Endoscopy;  Laterality: N/A;    EPIDURAL STEROID INJECTION INTO CERVICAL SPINE N/A 2023    Procedure: C6/7 IL ROCAEL RN IV Sedation;  Surgeon: Otto Phillips MD;  Location: [facility] PAIN MGT;  Service: Pain Management;  Laterality: N/A;    EPIDURAL STEROID INJECTION INTO CERVICAL SPINE N/A 2024    Procedure: C5/6 IL ROCAEL;  Surgeon: Otto Phillips MD;  Location: [facility] PAIN MGT;  Service: Pain Management;  Laterality: N/A;    ESOPHAGOGASTRODUODENOSCOPY N/A 3/10/2023    Procedure: EGD (ESOPHAGOGASTRODUODENOSCOPY);  Surgeon: Lalita Montes De Oca MD;  Location: [facility];  Service: Endoscopy;  Laterality: N/A;    HYSTERECTOMY      w/ BSO; hypermenorrhea    INJECTION OF ANESTHETIC AGENT AROUND MEDIAL BRANCH NERVES INNERVATING  CERVICAL FACET JOINT Bilateral 12/19/2023    Procedure: Bilateral C5-7 MBB (diagnostic);  Surgeon: Otto Phillips MD;  Location: [facility];  Service: Pain Management;  Laterality: Bilateral;    MOUTH SURGERY  1996    OOPHORECTOMY      hyst/bso, hypermenorrhea    ROBOT-ASSISTED CHOLECYSTECTOMY USING DA DARI XI N/A 1/3/2020    Procedure: XI ROBOTIC CHOLECYSTECTOMY;  Surgeon: Damir Driscoll MD;  Location: [facility];  Service: General;  Laterality: N/A;    TOTAL REDUCTION MAMMOPLASTY      TUBAL LIGATION       History of Present Illness: Yolanda Betts is a 54 y.o. female patient with a PMHx of anemia, anxiety, dipolar 1 disorder, GERD, asthma, type 2 DM, HTN, bipolar type schizoaffective disorder, dyslipidemia, morbid obesity, HSV, ALEN, HLD, myoclonus, and seizures, presents to hospital with self reported seizures, consulted to NSGY for SDH. Pt reports shaking episodes started 2 days ago. Her whole body and head shake. She does not know how long the episodes last. She does not lose consciousness during and she is able to participate in interview during the episode. She denies falling and hitting her head     Prior Intubation HX:  none this admission    Modified Barium Swallow: none on file    Chest X-Rays: 11/30- Mediastinal structures are midline.  Cardiomediastinal silhouette appears within normal limits. Lungs are well aerated.  Mildly coarsened interstitial attenuation.  No focal consolidation.  No pneumothorax.  No significant pleural fluid. Osseous structures appear intact.    Prior diet: regular/thin      Subjective     Spoke with RN prior to entering pt's room . Pt seen bedside for session. Alert and agreeable to ST.       Pain/Comfort:  Pain Rating 1: 0/10  Pain Rating Post-Intervention 1: 0/10    Respiratory Status: Room air    Objective:     Oral Musculature Evaluation  Oral Musculature: WFL  Dentition: present and adequate  Secretion Management: adequate  Mucosal Quality: adequate  Oral Labial  Strength and Mobility: WFL  Lingual Strength and Mobility: WFL  Volitional Cough: elicited  Volitional Swallow: elicited  Voice Prior to PO Intake: clear    Bedside Swallow Eval:   Consistencies Assessed:  Thin liquids via single and consecutive straw sips  Puree applesauce  Solids crackers      Oral Phase:   WFL    Pharyngeal Phase:   no overt clinical signs/symptoms of aspiration  no overt clinical signs/symptoms of pharyngeal dysphagia    Compensatory Strategies  None    Treatment: Provided education to patient re: role of ST,  POC, swallow precautions, recommendations for regular diet with  thin  liquids, and plan to f/u to complete cognitive-communication evaluation. She verbalized understanding. White board updated. RN notified of results and recs. At end of session, pt remained bedside with call light and all needs in reach.       Goals:   Multidisciplinary Problems       SLP Goals          Problem: SLP    Goal Priority Disciplines Outcome   SLP Goal     SLP    Description: Goals expected to be met by 12/7:  1. Pt will participate in cognitive-linguistic evaluation to further develop POC.                          Plan:     Patient to be seen:  3 x/week   Plan of Care expires:  12/29/24  Plan of Care reviewed with:  patient   SLP Follow-Up:  Yes       Discharge recommendations:   (tbd)   Barriers to Discharge:  None    Time Tracking:     SLP Treatment Date:   11/30/24  Speech Start Time:  0833  Speech Stop Time:  0841     Speech Total Time (min):  8 min    Billable Minutes: Eval Swallow and Oral Function 8    11/30/2024

## 2024-11-30 NOTE — ASSESSMENT & PLAN NOTE
On Cosentyx every 2 weeks.  Recently completed steroids for back pain.    Hold Celebrex  PRN pain regimen  Lidocaine patch

## 2024-11-30 NOTE — PLAN OF CARE
[facility] Care Plan    POC reviewed with Yolanda Betts and family at 0300. Patient verbalized understanding. Questions and concerns addressed. No acute events today. Pt progressing toward goals. Will continue to monitor. See below and flowsheets for full assessment and VS info.       CT scan completed  24 Hour EEG monitor ordered  Pacheco completed      Is this a stroke patient? no    Neuro:  Mar Coma Scale  Best Eye Response: 4-->(E4) spontaneous  Best Motor Response: 6-->(M6) obeys commands  Best Verbal Response: 5-->(V5) oriented  Walker Coma Scale Score: 15  Pupil PERRLA: yes     24 hr Temp:  [98.9 °F (37.2 °C)-99.2 °F (37.3 °C)]     CV:   Rhythm: normal sinus rhythm  BP goals:   SBP < 160  MAP > 65    Resp:           Plan: N/A    GI/:        Last Bowel Movement: 09/26/24     No intake or output data in the 24 hours ending 11/30/24 0514       Labs/Accuchecks:  Recent Labs   Lab 11/30/24  0256   WBC 11.15   RBC 4.00   HGB 9.4*   HCT 31.0*         Recent Labs   Lab 11/30/24  0256   *   K 4.3   CO2 24      BUN 11   CREATININE 0.7   ALKPHOS 88   ALT 19   AST 16   BILITOT 0.3      Recent Labs   Lab 11/30/24  0256   INR 1.0   APTT 28.0      Recent Labs   Lab 11/29/24  1704   CPK 63       Electrolytes: N/A - electrolytes WDL  Accuchecks: Q6H    Gtts:      LDA/Wounds:    Jayy Risk Assessment  Sensory Perception: 4-->no impairment  Moisture: 3-->occasionally moist  Activity: 1-->bedfast  Mobility: 3-->slightly limited  Nutrition: 3-->adequate  Friction and Shear: 2-->potential problem  Jayy Score: 16  Is your jayy score 12 or less? no          Restraints:        WCTM

## 2024-11-30 NOTE — ASSESSMENT & PLAN NOTE
From most recent psych note:  Lamictal 200 mg bid  Xanax 1 mg bid -hold in acute setting  Ambien 5 mg hs- hold in acute setting  Cymbalta 60 mg bid   Caplyta 10.5 mg qd -not on formulary and patient does not have with her

## 2024-11-30 NOTE — SUBJECTIVE & OBJECTIVE
Past Medical History:   Diagnosis Date    Abnormal Pap smear of cervix     HPV genital warts    Anemia     Anxiety     Arthritis     Asthma 10/11/2016    Bipolar 1 disorder     Diabetes mellitus, type 2     Dyslipidemia associated with type 2 diabetes mellitus 2019    Dyspnea due to COVID-19 2024    General anesthetics causing adverse effect in therapeutic use     Genital warts     GERD (gastroesophageal reflux disease)     Herpes simplex virus (HSV) infection     Hyperlipidemia     Hypertension     Hypertension complicating diabetes 2019    Migraine with aura and without status migrainosus, not intractable 2016    Mild persistent asthma without complication 10/11/2016    Morbid obesity with body mass index (BMI) of 45.0 to 49.9 in adult 2017    Myoclonus     Obstructive sleep apnea     ALEN (obstructive sleep apnea)     2L per N/C q HS    Schizoaffective disorder, bipolar type 2019    Seasonal allergic rhinitis due to pollen 2019    Seizures     Type 2 diabetes mellitus with hyperglycemia, with long-term current use of insulin 2017    Type 2 diabetes mellitus with hyperglycemia, without long-term current use of insulin 2017     Past Surgical History:   Procedure Laterality Date    abcess removed right back      ANGIOGRAM, CORONARY, WITH LEFT HEART CATHETERIZATION N/A 2024    Procedure: Angiogram, Coronary, with Left Heart Cath;  Surgeon: Jaimie Wills MD;  Location: [facility] CATH LAB;  Service: Cardiology;  Laterality: N/A;    BELT ABDOMINOPLASTY      BREAST SURGERY Bilateral     Reduction    CARPAL TUNNEL RELEASE Bilateral 2023    Procedure: RELEASE, CARPAL TUNNEL;  Surgeon: Neville Roth MD;  Location: [facility];  Service: Orthopedics;  Laterality: Bilateral;  bilateral carpal tunnel release     SECTION      X 2    COLONOSCOPY      COLONOSCOPY N/A 2018    Procedure: colonoscopy for iron deficiency anemia;  Surgeon:  Frankie Sanchez MD;  Location: [facility];  Service: Endoscopy;  Laterality: N/A;    COLONOSCOPY N/A 2/28/2018    Procedure: COLONOSCOPY;  Surgeon: Frankie Sanchez MD;  Location: [facility] ENDO;  Service: Endoscopy;  Laterality: N/A;    COLONOSCOPY N/A 3/10/2023    Procedure: COLONOSCOPY;  Surgeon: Lalita Montes De Oca MD;  Location: [facility] ENDO;  Service: Endoscopy;  Laterality: N/A;    COLONOSCOPY N/A 4/4/2024    Procedure: COLONOSCOPY;  Surgeon: Tara Og MD;  Location: [facility] ENDO;  Service: Endoscopy;  Laterality: N/A;    EPIDURAL STEROID INJECTION INTO CERVICAL SPINE N/A 1/31/2023    Procedure: C6/7 IL ROCAEL RN IV Sedation;  Surgeon: Otto Phillips MD;  Location: [facility] PAIN MGT;  Service: Pain Management;  Laterality: N/A;    EPIDURAL STEROID INJECTION INTO CERVICAL SPINE N/A 1/30/2024    Procedure: C5/6 IL ROCAEL;  Surgeon: Otto Phillips MD;  Location: [facility] PAIN MGT;  Service: Pain Management;  Laterality: N/A;    ESOPHAGOGASTRODUODENOSCOPY N/A 3/10/2023    Procedure: EGD (ESOPHAGOGASTRODUODENOSCOPY);  Surgeon: Lalita Montes De Oca MD;  Location: [facility];  Service: Endoscopy;  Laterality: N/A;    HYSTERECTOMY      w/ BSO; hypermenorrhea    INJECTION OF ANESTHETIC AGENT AROUND MEDIAL BRANCH NERVES INNERVATING CERVICAL FACET JOINT Bilateral 12/19/2023    Procedure: Bilateral C5-7 MBB (diagnostic);  Surgeon: Otto Phillips MD;  Location: [facility] PAIN MGT;  Service: Pain Management;  Laterality: Bilateral;    MOUTH SURGERY  1996    OOPHORECTOMY      hyst/bso, hypermenorrhea    ROBOT-ASSISTED CHOLECYSTECTOMY USING DA DARI XI N/A 1/3/2020    Procedure: XI ROBOTIC CHOLECYSTECTOMY;  Surgeon: Damir Driscoll MD;  Location: [facility];  Service: General;  Laterality: N/A;    TOTAL REDUCTION MAMMOPLASTY      TUBAL LIGATION        Current Facility-Administered Medications on File Prior to Encounter   Medication Dose Route Frequency Provider Last Rate Last Admin    [COMPLETED] amLODIPine tablet 10 mg  10 mg Oral ED  "1 Time Gregory Begum Jr., MD   10 mg at 11/29/24 2302    [COMPLETED] fentaNYL 50 mcg/mL injection 25 mcg  25 mcg Intravenous ED 1 Time Gregory Begum Jr., MD   25 mcg at 11/30/24 0012    [COMPLETED] levETIRAcetam injection 1,000 mg  1,000 mg Intravenous ED 1 Time Gregory Begum Jr., MD   1,000 mg at 11/29/24 2020    [COMPLETED] LORazepam injection 2 mg  2 mg Intravenous ED 1 Time Ayush Ward, DO   2 mg at 11/29/24 1704    ondansetron injection 4 mg  4 mg Intravenous Once PRN Otto Phillips MD        [COMPLETED] ondansetron injection 4 mg  4 mg Intravenous ED 1 Time Gregory Begum Jr., MD   4 mg at 11/30/24 0013     Current Outpatient Medications on File Prior to Encounter   Medication Sig Dispense Refill    albuterol (PROVENTIL) 2.5 mg /3 mL (0.083 %) nebulizer solution Take 2.5 mg by nebulization every 4 (four) hours as needed. Times a day 75 mL 1    albuterol (PROVENTIL/VENTOLIN HFA) 90 mcg/actuation inhaler Inhale 2 puffs into the lungs every 6 (six) hours as needed for Wheezing. 8.5 g 1    ALPRAZolam (XANAX) 1 MG tablet Take 1 tablet (1 mg total) by mouth 2 (two) times daily as needed for Anxiety. 60 tablet 2    amlodipine-valsartan (EXFORGE)  mg per tablet Take 1 tablet by mouth once daily. 90 tablet 1    atorvastatin (LIPITOR) 20 MG tablet Take 1 tablet (20 mg total) by mouth every evening 90 tablet 3    azelastine (ASTELIN) 137 mcg (0.1 %) nasal spray 2 sprays (274 mcg total) by Nasal route 2 (two) times daily. 30 mL 0    azelastine-fluticasone (DYMISTA) 137-50 mcg/spray Spry nassal spray Take 1 puff by Nasal route in the morning and 1 puff before bedtime. Do all this for 30 days. 23 g 3    BD INSULIN SYRINGE ULTRA-FINE 1/2 mL 30 gauge x 1/2" Syrg   0    blood-glucose meter,continuous (DEXCOM G7 ) Misc 1 each by Misc.(Non-Drug; Combo Route) route once. for 1 dose 1 each 0    blood-glucose sensor (DEXCOM G6 SENSOR) Mariela change sensor every 10 days. 3 each 11    " blood-glucose sensor (DEXCOM G7 SENSOR) Mariela Use to check bg. Change every 10 days 3 each 11    blood-glucose transmitter (DEXCOM G6 TRANSMITTER) Mariela 1 each by Misc.(Non-Drug; Combo Route) route every 3 (three) months. 1 each 3    blood-glucose transmitter (DEXCOM G6 TRANSMITTER) Mariela Use as directed change every 3 months 1 each 3    celecoxib (CELEBREX) 100 MG capsule Take 1 capsule (100 mg total) by mouth 2 (two) times daily. 60 capsule 2    cevimeline (EVOXAC) 30 mg capsule Take 1 capsule by mouth in the morning and 1 capsule at noon and 1 capsule before bedtime. Do all this for 30 days. 90 capsule 0    cloNIDine (CATAPRES) 0.1 MG tablet Take 1 tablet (0.1 mg total) by mouth 2 (two) times daily. 60 tablet 11    cyclobenzaprine (FLEXERIL) 10 MG tablet Take 1 tablet (10 mg total) by mouth 3 (three) times daily as needed (Pain). 90 tablet 11    diazePAM (VALIUM) 5 MG tablet Take 1 tablet (5 mg total) by mouth once. 45 minutes to 1 hour prior to MRI for procedural anxiety for 1 dose 1 tablet 0    DULoxetine (CYMBALTA) 60 MG capsule Take 1 capsule by mouth 2 times daily 180 capsule 3    ergocalciferol (VITAMIN D2) 50,000 unit Cap Take 1 capsule twice weekly for 3 weeks then weekly 12 capsule 3    estradioL (ESTRACE) 0.01 % (0.1 mg/gram) vaginal cream Place 1 g vaginally twice a week. 42.5 g 3    fenofibrate (TRICOR) 145 MG tablet Take 1 tablet by mouth in the morning. 30 tablet 5    fluconazole (DIFLUCAN) 150 MG Tab Take 1 tablet (150 mg total) by mouth once daily. May repeat after 72 hours if symptoms persist for 3 doses 3 tablet 0    fluticasone propionate (FLONASE) 50 mcg/actuation nasal spray Take 1 spray by Each Nostril route in the morning. 16 g 3    furosemide (LASIX) 20 MG tablet Take 1 tablet (20 mg total) by mouth once daily. 90 tablet 3    glucagon (BAQSIMI) 3 mg/actuation Spry 1 spray by Nasal route as needed (hypoglycemia). For emergency use. May repeat 1 time after 15 minutes 2 each 1    glucagon  "(BAQSIMI) 3 mg/actuation [place] SPRAY 1 SPRAY IN NOSTRIL AS NEEDED FOR EMERGENCY. MAY REPEAT 1 TIME AFTER 15 MINUTES 2 each 1    insulin aspart U-100 (NOVOLOG FLEXPEN U-100 INSULIN) 100 unit/mL (3 mL) InPn pen Inject up to 10 units under the skin per sliding scale 3 times a day before meals 15 mL 3    insulin aspart U-100 (NOVOLOG U-100 INSULIN ASPART) 100 unit/mL injection To use via insulin pump. Up to 200 units every 3 days 20 mL 5    insulin aspart U-100 (NOVOLOG) 100 unit/mL injection NovoLOG FlexPen      insulin glargine U-100, Lantus, (LANTUS SOLOSTAR U-100 INSULIN) 100 unit/mL (3 mL) InPn pen Inject up to 50 units daily in event of pump failure 15 mL 3    insulin pump cart,automated,BT (OMNIPOD 5 G6 PODS, GEN 5,) Crtg 1 each by Misc.(Non-Drug; Combo Route) route every 48 hours. 45 each 3    insulin syringe-needle U-100 0.3 mL 30 gauge x 5/16" Syrg 1 each by Misc.(Non-Drug; Combo Route) route Every 3 (three) days. 100 each 2    lamoTRIgine (LAMICTAL) 200 MG tablet Take 1 tablet (200 mg total) by mouth 2 (two) times daily. 60 tablet 2    levocetirizine (XYZAL) 5 MG tablet Take 1 tablet (5 mg total) by mouth every evening. 30 tablet 11    LIDOcaine (LIDODERM) 5 % Place 2 patches onto the skin every 12 (twelve) hours as needed (pain). Remove & Discard patch within 12 hours or as directed by MD 60 patch 11    lifitegrast (XIIDRA) 5 % Dpet Place 1 drop into both eyes 2 (two) times daily. 60 each 12    lumateperone (CAPLYTA) 10.5 mg Cap Take 1 capsule (10.5 mg total) by mouth once daily. 30 capsule 1    magic mouthwash diphen/antac/lidoc/nysta Swish and spit 5 mLs every 4 (four) hours as needed. 118 mL 0    magnesium hydroxide 400 mg/5 ml (MILK OF MAGNESIA) 400 mg/5 mL Susp Take 5 mLs by mouth every 4 (four) hours as needed.      magnesium oxide (MAG-OX) 400 mg (241.3 mg magnesium) tablet Take 1 tablet (400 mg total) by mouth once daily. 90 tablet 3    methylPREDNISolone (MEDROL DOSEPACK) 4 mg tablet use as directed " "21 tablet 1    metoprolol succinate (TOPROL-XL) 50 MG 24 hr tablet Take 1 tablet (50 mg total) by mouth every morning. 30 tablet 11    metroNIDAZOLE (FLAGYL) 500 MG tablet Take 1 tablet (500 mg total) by mouth every 12 (twelve) hours for 7 days 14 tablet 0    nebulizer and compressor (COMP-AIR NEBULIZER COMPRESSOR) Mariela use as directed 1 each 0    omeprazole (PRILOSEC) 40 MG capsule Take 1 capsule (40 mg total) by mouth once daily. 90 capsule 1    OZEMPIC 2 mg/dose (8 mg/3 mL) PnIj Inject 2 mg into the skin every 7 days.      pen needle, diabetic (BD ULTRA-FINE MINI PEN NEEDLE) 31 gauge x 3/16" Ndle Use 1 a day in event of pump failure 100 each 11    polyethylene glycol (GLYCOLAX) 17 gram/dose powder Take 17 g by mouth 2 (two) times daily. 1020 g 11    promethazine (PHENERGAN) 25 MG tablet Take 25 mg by mouth every 6 (six) hours as needed.      secukinumab (COSENTYX PEN, 2 PENS,) 150 mg/mL PnIj Inject 300 mg into the skin every 14 (fourteen) days. 4 mL 11    semaglutide (OZEMPIC) 2 mg/dose (8 mg/3 mL) PnIj Inject 2 mg into the skin every 7 days. 3 mL 2    semaglutide (OZEMPIC) 2 mg/dose (8 mg/3 mL) PnIj Inject 2 mg under the skin every 7 days. 3 mL 2    spironolactone (ALDACTONE) 25 MG tablet Take 1 tablet by mouth in the morning. 90 tablet 1    sulfaSALAzine (AZULFIDINE) 500 MG EC tablet Take 1 tablet (500 mg total) by mouth 2 (two) times daily. 60 tablet 0    SYMBICORT 160-4.5 mcg/actuation HFAA Inhale 2 puffs into the lungs in the morning and 2 puffs before bedtime. 2 puffs every 12 hours 10.2 g 2    triamcinolone acetonide 0.1% (KENALOG) 0.1 % paste Apply coating 2 times daily 5 g 12    VITAMIN D2 1,250 mcg (50,000 unit) capsule Take 1 capsule by mouth Take once weekly. 1 capsule every 14 days 4 capsule 3    zolpidem (AMBIEN) 5 MG Tab Take 1 tablet (5 mg total) by mouth nightly as needed (anxiety). 30 tablet 2    [DISCONTINUED] clonazePAM (KLONOPIN) 1 MG tablet Take 1 tablet by mouth daily 30 tablet 0    " "[DISCONTINUED] glucagon, human recombinant, (GLUCAGON EMERGENCY KIT, HUMAN,) 1 mg SolR Inject 1 mg into the muscle as needed. Emergency use 1 each 1    [DISCONTINUED] valsartan (DIOVAN) 320 MG tablet Take 1 tablet (320 mg total) by mouth once daily. 90 tablet 3      Allergies: Codeine; Hydromorphone; Aleve [naproxen sodium]; Ibuprofen; Neuromuscular blockers, steroidal; Pregabalin; Latex, natural rubber; Morphine; Norco [hydrocodone-acetaminophen]; Secobarbital sodium; and Tylox [oxycodone-acetaminophen]  Family History   Problem Relation Name Age of Onset    Diabetes Mother      Hyperlipidemia Mother      Hypertension Mother      Asthma Mother      COPD Mother      Glaucoma Mother      Thyroid disease Mother      Anesthesia problems Mother          "almost had a cardiac arrest" , blood clots    Hypertension Father      Hyperlipidemia Father      Cancer Father          Brain, lung, liver, kidney    No Known Problems Sister      Hypertension Brother      Alcohol abuse Brother      Heart disease Maternal Grandmother      Hyperlipidemia Maternal Grandmother      Hypertension Maternal Grandmother      Cataracts Maternal Grandmother      Diabetes Maternal Grandmother      Heart disease Maternal Grandfather      Hyperlipidemia Maternal Grandfather      Hypertension Maternal Grandfather      Glaucoma Maternal Grandfather      Cancer Maternal Grandfather      Cataracts Maternal Grandfather      Macular degeneration Maternal Grandfather      Diabetes Maternal Grandfather      Heart disease Paternal Grandmother      Hyperlipidemia Paternal Grandmother      Hypertension Paternal Grandmother      Cataracts Paternal Grandmother      Heart disease Paternal Grandfather      Hyperlipidemia Paternal Grandfather      Hypertension Paternal Grandfather      Cataracts Paternal Grandfather      Breast cancer Maternal Cousin      Breast cancer Maternal Cousin      Breast cancer Maternal Cousin      Breast cancer Maternal Cousin   "     Social History     Tobacco Use    Smoking status: Never    Smokeless tobacco: Never   Substance Use Topics    Alcohol use: Yes     Alcohol/week: 0.0 standard drinks of alcohol     Comment: socially  No alcohol 72h prior to sx    Drug use: No     Review of Systems   Constitutional:  Negative for chills, fatigue and fever.   HENT:  Negative for trouble swallowing.    Eyes:  Positive for photophobia. Negative for visual disturbance.   Respiratory:  Negative for shortness of breath.    Cardiovascular:  Negative for chest pain and palpitations.   Gastrointestinal:  Positive for constipation. Negative for abdominal pain, nausea and vomiting.   Genitourinary:  Negative for difficulty urinating.   Musculoskeletal:  Positive for back pain. Negative for gait problem, neck pain and neck stiffness.   Neurological:  Positive for seizures and headaches. Negative for dizziness, tremors, syncope, speech difficulty, weakness and numbness.   Psychiatric/Behavioral:  Negative for confusion.      Objective:     Vitals:         Temp  Min: 98.9 °F (37.2 °C)  Max: 98.9 °F (37.2 °C)  Pulse  Min: 93  Max: 113  BP  Min: 125/87  Max: 161/84  MAP (mmHg)  Min: 99  Max: 116  Resp  Min: 17  Max: 22  SpO2  Min: 96 %  Max: 99 %    No intake/output data recorded.            Physical Exam  Vitals and nursing note reviewed.   Constitutional:       General: She is not in acute distress.     Appearance: She is obese.   HENT:      Head: Normocephalic and atraumatic.      Right Ear: External ear normal.      Left Ear: External ear normal.      Nose: Nose normal.      Mouth/Throat:      Mouth: Mucous membranes are dry.      Pharynx: Oropharynx is clear.   Eyes:      Extraocular Movements: Extraocular movements intact.      Conjunctiva/sclera: Conjunctivae normal.      Pupils: Pupils are equal, round, and reactive to light.   Cardiovascular:      Rate and Rhythm: Tachycardia present.   Pulmonary:      Effort: Pulmonary effort is normal. No respiratory  distress.   Abdominal:      General: There is no distension.      Palpations: Abdomen is soft.      Tenderness: There is no abdominal tenderness. There is no guarding.   Musculoskeletal:      Cervical back: Normal range of motion.      Right lower leg: No edema.      Left lower leg: No edema.   Skin:     General: Skin is warm and dry.      Capillary Refill: Capillary refill takes less than 2 seconds.   Neurological:      Comments: E3 V5 M6  Drowsy but briskly awakens to voice. Once awake, pt develops a head jerking movement to the left, involving the L shoulder, and it is occasionally accompanied by LUE and LLE movement.   Oriented x person, place, and year. Speech fluent, no aphasia or dysarthria. Following commands.   CN II-XII grossly intact, specifically:  PERRL  EOMI  Visual fields intact   No facial asymmetry. Facial sensation intact in V1-V3.  Tongue midline.   Shoulder shrug symmetric  Motor:  Moving all extremities spontaneously and antigravity.   When testing strength, pt visibly and audibly strains to lift her arms off of bed but immediately drops back to the bed stating she cannot lift them from pain and exhaustion.  Sensation intact to light touch throughout.            Today I personally reviewed pertinent medications, lines/drains/airways, imaging, cardiology results, laboratory results, microbiology results, notably:  TSH wnl   Na 135    Lipid panel tgl 207  Coags wnl  WBC 11  H/H 9.4/31  CK 63  Etoh <10  Lactic 1.4    Utox + benzos- she is prescribed xanax     [facility] 1743 on 11/29/24  1.  Small right lateral convexity subdural hematoma measuring 3 mm in thickness.  Negative for midline shift.  2.  Negative for acute intracranial process otherwise.  Negative for other areas of hemorrhage, or skull fracture.  3.  Stable findings as noted above.

## 2024-11-30 NOTE — ASSESSMENT & PLAN NOTE
On omeprazole at home    Prn famotidine   Consent: The patient's consent was obtained including but not limited to risks of crusting, scabbing, blistering, scarring, darker or lighter pigmentary change, recurrence, incomplete removal and infection. Show Aperture Variable?: Yes Detail Level: Detailed Render Post-Care Instructions In Note?: no Duration Of Freeze Thaw-Cycle (Seconds): 0 Post-Care Instructions: I reviewed with the patient in detail post-care instructions. Patient is to wear sunprotection, and avoid picking at any of the treated lesions. Pt may apply Vaseline to crusted or scabbing areas.

## 2024-11-30 NOTE — PLAN OF CARE
Bedside swallow assessment completed. Recommend regular texture diet with thin liquids. ST to f/u to complete cognitive-linguistic evaluation.   Michelle Aguilar CCC-SLP  11/30/2024  12:44 PM

## 2024-11-30 NOTE — CONSULTS
[facility]  Neurosurgery  Consult Note    Subjective:     History of Present Illness: Yolanda Betts is a 54 y.o. female patient with a PMHx of anemia, anxiety, dipolar 1 disorder, GERD, asthma, type 2 DM, HTN, bipolar type schizoaffective disorder, dyslipidemia, morbid obesity, HSV, ALEN, HLD, myoclonus, and seizures, presents to hospital with self reported seizures, consulted to NSGY for SDH. Pt reports shaking episodes started 2 days ago. Her whole body and head shake. She does not know how long the episodes last. She does not lose consciousness during and she is able to participate in interview during the episode. She denies falling and hitting her head    Post-Op Info:  * No surgery found *         Past Medical History:   Diagnosis Date    Abnormal Pap smear of cervix     HPV genital warts    Anemia     Anxiety     Arthritis     Asthma 10/11/2016    Bipolar 1 disorder     Diabetes mellitus, type 2     Dyslipidemia associated with type 2 diabetes mellitus 05/13/2019    Dyspnea due to COVID-19 09/12/2024    General anesthetics causing adverse effect in therapeutic use     Genital warts     GERD (gastroesophageal reflux disease)     Herpes simplex virus (HSV) infection     Hyperlipidemia     Hypertension     Hypertension complicating diabetes 05/05/2019    Migraine with aura and without status migrainosus, not intractable 03/21/2016    Mild persistent asthma without complication 10/11/2016    Morbid obesity with body mass index (BMI) of 45.0 to 49.9 in adult 08/03/2017    Myoclonus     Obstructive sleep apnea     ALEN (obstructive sleep apnea)     2L per N/C q HS    Schizoaffective disorder, bipolar type 04/25/2019    Seasonal allergic rhinitis due to pollen 05/13/2019    Seizures     Type 2 diabetes mellitus with hyperglycemia, with long-term current use of insulin 12/21/2017    Type 2 diabetes mellitus with hyperglycemia, without long-term current use of insulin 12/21/2017       Past  Surgical History:   Procedure Laterality Date    abcess removed right back      ANGIOGRAM, CORONARY, WITH LEFT HEART CATHETERIZATION N/A 2024    Procedure: Angiogram, Coronary, with Left Heart Cath;  Surgeon: Jaimie Wills MD;  Location: [facility] CATH LAB;  Service: Cardiology;  Laterality: N/A;    BELT ABDOMINOPLASTY      BREAST SURGERY Bilateral     Reduction    CARPAL TUNNEL RELEASE Bilateral 2023    Procedure: RELEASE, CARPAL TUNNEL;  Surgeon: Neville Roth MD;  Location: [facility] OR;  Service: Orthopedics;  Laterality: Bilateral;  bilateral carpal tunnel release     SECTION      X 2    COLONOSCOPY      COLONOSCOPY N/A 2018    Procedure: colonoscopy for iron deficiency anemia;  Surgeon: Frankie Sanchez MD;  Location: [facility];  Service: Endoscopy;  Laterality: N/A;    COLONOSCOPY N/A 2018    Procedure: COLONOSCOPY;  Surgeon: Frankie Sanchez MD;  Location: [facility];  Service: Endoscopy;  Laterality: N/A;    COLONOSCOPY N/A 3/10/2023    Procedure: COLONOSCOPY;  Surgeon: Lalita Montes De Oca MD;  Location: [facility];  Service: Endoscopy;  Laterality: N/A;    COLONOSCOPY N/A 2024    Procedure: COLONOSCOPY;  Surgeon: Tara Og MD;  Location: [facility];  Service: Endoscopy;  Laterality: N/A;    EPIDURAL STEROID INJECTION INTO CERVICAL SPINE N/A 2023    Procedure: C6/7 IL ROCAEL RN IV Sedation;  Surgeon: Otto Phillips MD;  Location: [facility] PAIN MGT;  Service: Pain Management;  Laterality: N/A;    EPIDURAL STEROID INJECTION INTO CERVICAL SPINE N/A 2024    Procedure: C5/6 IL ROCAEL;  Surgeon: Otto Phillips MD;  Location: [facility] PAIN MGT;  Service: Pain Management;  Laterality: N/A;    ESOPHAGOGASTRODUODENOSCOPY N/A 3/10/2023    Procedure: EGD (ESOPHAGOGASTRODUODENOSCOPY);  Surgeon: Lalita Montes De Oca MD;  Location: [facility];  Service: Endoscopy;  Laterality: N/A;    HYSTERECTOMY      w/ BSO; hypermenorrhea    INJECTION OF ANESTHETIC AGENT AROUND  MEDIAL BRANCH NERVES INNERVATING CERVICAL FACET JOINT Bilateral 12/19/2023    Procedure: Bilateral C5-7 MBB (diagnostic);  Surgeon: Otto Phillips MD;  [facility];  Service: Pain Management;  Laterality: Bilateral;    MOUTH SURGERY  1996    OOPHORECTOMY      hyst/bso, hypermenorrhea    ROBOT-ASSISTED CHOLECYSTECTOMY USING DA DARI XI N/A 1/3/2020    Procedure: XI ROBOTIC CHOLECYSTECTOMY;  Surgeon: Damir Driscoll MD;  Location: [facility] OR;  Service: General;  Laterality: N/A;    TOTAL REDUCTION MAMMOPLASTY      TUBAL LIGATION         Social History     Socioeconomic History    Marital status: Single   Tobacco Use    Smoking status: Never    Smokeless tobacco: Never   Substance and Sexual Activity    Alcohol use: Yes     Alcohol/week: 0.0 standard drinks of alcohol     Comment: socially  No alcohol 72h prior to sx    Drug use: No    Sexual activity: Yes     Partners: Male     Birth control/protection: Surgical     Comment: hyst   Social History Narrative    Long-term care nurse     Social Drivers of Health     Financial Resource Strain: Medium Risk (11/30/2024)    Overall Financial Resource Strain (CARDIA)     Difficulty of Paying Living Expenses: Somewhat hard   Food Insecurity: Food Insecurity Present (11/30/2024)    Hunger Vital Sign     Worried About Running Out of Food in the Last Year: Often true     Ran Out of Food in the Last Year: Often true   Transportation Needs: No Transportation Needs (11/30/2024)    TRANSPORTATION NEEDS     Transportation : No   Physical Activity: Inactive (10/15/2024)    Exercise Vital Sign     Days of Exercise per Week: 0 days     Minutes of Exercise per Session: 0 min   Stress: Stress Concern Present (11/30/2024)    [employer] - Occupational Stress Questionnaire     Feeling of Stress : Very much   Housing Stability: High Risk (11/30/2024)    Housing Stability Vital Sign     Unable to Pay for Housing in the Last Year: Yes     Homeless in  "the Last Year: No       Family History   Problem Relation Name Age of Onset    Diabetes Mother      Hyperlipidemia Mother      Hypertension Mother      Asthma Mother      COPD Mother      Glaucoma Mother      Thyroid disease Mother      Anesthesia problems Mother          "almost had a cardiac arrest" , blood clots    Hypertension Father      Hyperlipidemia Father      Cancer Father          Brain, lung, liver, kidney    No Known Problems Sister      Hypertension Brother      Alcohol abuse Brother      Heart disease Maternal Grandmother      Hyperlipidemia Maternal Grandmother      Hypertension Maternal Grandmother      Cataracts Maternal Grandmother      Diabetes Maternal Grandmother      Heart disease Maternal Grandfather      Hyperlipidemia Maternal Grandfather      Hypertension Maternal Grandfather      Glaucoma Maternal Grandfather      Cancer Maternal Grandfather      Cataracts Maternal Grandfather      Macular degeneration Maternal Grandfather      Diabetes Maternal Grandfather      Heart disease Paternal Grandmother      Hyperlipidemia Paternal Grandmother      Hypertension Paternal Grandmother      Cataracts Paternal Grandmother      Heart disease Paternal Grandfather      Hyperlipidemia Paternal Grandfather      Hypertension Paternal Grandfather      Cataracts Paternal Grandfather      Breast cancer Maternal Cousin      Breast cancer Maternal Cousin      Breast cancer Maternal Cousin      Breast cancer Maternal Cousin         Review of patient's allergies indicates:   Allergen Reactions    Codeine Itching    Hydromorphone Other (See Comments)     Can't wake up for long time if taken    Slow to wake up after surgery after receiving    Aleve [naproxen sodium]      Increases BP    Ibuprofen      Increases BP    Neuromuscular blockers, steroidal Hives     some    Pregabalin Itching    Latex, natural rubber Rash    Morphine Rash     itching    Norco [hydrocodone-acetaminophen] Itching, Rash and Hallucinations "    Secobarbital sodium Rash     itching    Tylox [oxycodone-acetaminophen] Rash         Medications:  Continuous Infusions:  Scheduled Meds:   atorvastatin  40 mg Oral Daily    DULoxetine  60 mg Oral BID    lamoTRIgine  200 mg Oral BID    levETIRAcetam (Keppra) IV (PEDS and ADULTS)  500 mg Intravenous Q12H    LIDOcaine  1 patch Transdermal Q24H    metoprolol succinate  50 mg Oral QAM    midazolam  2 mg Intravenous Once    mupirocin   Nasal BID    polyethylene glycol  17 g Oral Daily    senna-docusate 8.6-50 mg  1 tablet Oral BID     PRN Meds:  Current Facility-Administered Medications:     albuterol, 2 puff, Inhalation, Q6H PRN    bisacodyL, 10 mg, Rectal, Daily PRN    dextrose 10%, 12.5 g, Intravenous, PRN    dextrose 10%, 25 g, Intravenous, PRN    famotidine, 20 mg, Oral, BID PRN    glucagon (human recombinant), 1 mg, Intramuscular, PRN    glucose, 16 g, Oral, PRN    glucose, 24 g, Oral, PRN    hydrALAZINE, 10 mg, Intravenous, Q6H PRN    insulin aspart U-100, 0-10 Units, Subcutaneous, QID (AC + HS) PRN    magnesium oxide, 800 mg, Oral, PRN    magnesium oxide, 800 mg, Oral, PRN    ondansetron, 4 mg, Intravenous, Q8H PRN    potassium bicarbonate, 35 mEq, Oral, PRN    potassium bicarbonate, 50 mEq, Oral, PRN    potassium bicarbonate, 60 mEq, Oral, PRN    potassium, sodium phosphates, 2 packet, Oral, PRN    potassium, sodium phosphates, 2 packet, Oral, PRN    potassium, sodium phosphates, 2 packet, Oral, PRN    sodium chloride 0.9%, 10 mL, Intravenous, PRN     Review of Systems  Objective:        There is no height or weight on file to calculate BMI.  Vital Signs (Most Recent):  Temp: 99.2 °F (37.3 °C) (11/30/24 0302)  Pulse: 90 (11/30/24 0502)  Resp: 15 (11/30/24 0502)  BP: 121/68 (11/30/24 0502)  SpO2: 96 % (11/30/24 0502) Vital Signs (24h Range):  Temp:  [98.9 °F (37.2 °C)-99.2 °F (37.3 °C)] 99.2 °F (37.3 °C)  Pulse:  [] 90  Resp:  [15-22] 15  SpO2:  [93 %-99 %] 96 %  BP: (114-161)/(58-88) 121/68                                  Physical Exam         Neurosurgery Physical Exam    Physical Exam:    Constitutional: whole body and head rhythmic shaking, but alert and talking during     HEENT: atraumatic/normocephalic    Cardiovascular: Regular rhythm.     Pulm: aerating well, saturating well    Abdominal: Soft.     Psych/Behavior: He is alert.     E3V5M6  AOx3  PERRL  EOMI  Face Symmetric  All extremities rhythmically jerking, but can follow commands by squeezing hands or wiggling toes       Significant Labs:  Recent Labs   Lab 11/29/24  1704 11/30/24  0256   GLU 91 138*    135*   K 4.5 4.3    104   CO2 23 24   BUN 13 11   CREATININE 0.7 0.7   CALCIUM 9.6 9.4   MG 2.0 1.9     Recent Labs   Lab 11/29/24  1704 11/30/24  0256   WBC 9.75 11.15   HGB 10.1* 9.4*   HCT 33.4* 31.0*    398     Recent Labs   Lab 11/29/24 2012 11/30/24  0256   INR 1.0 1.0   APTT 27.7 28.0     Microbiology Results (last 7 days)       ** No results found for the last 168 hours. **          All pertinent labs from the last 24 hours have been reviewed.    Significant Diagnostics:  I have reviewed and interpreted all pertinent imaging results/findings within the past 24 hours.  Assessment/Plan:     * SDH (subdural hematoma)  Yolanda Betts is a 54 y.o. female patient with a PMHx of anemia, anxiety, dipolar 1 disorder, GERD, asthma, type 2 DM, HTN, bipolar type schizoaffective disorder, dyslipidemia, morbid obesity, HSV, ALEN, HLD, myoclonus, and seizures, presents to hospital with self reported seizures, possibly pseudoseizures, consulted to NSGY for SDH.       CTH with very small, right focal SDH 3 mm in thickness  CTH repeat stable    - ICU status  - Q1 hour neurochecks  - SBP < 160  - EEG per ICU  - AEDs per ICU  - OK for SQH        Lata Matias MD  Neurosurgery  [facility]

## 2024-11-30 NOTE — ASSESSMENT & PLAN NOTE
SBP < 160  Home metoprolol succinate 50mg qd  Patient states she was recently prescribed clonidine but her SBP has been  at home so she has not taken it  Prn hydralazine

## 2024-11-30 NOTE — PLAN OF CARE
Recommendations  Continue diabetic; 2000 kcal diet  Nursing, continue documenting PO intake via flowsheets  If intake <50% add Boost GC BID  Monitor labs, meds, weight.  Goals: Meet % een/epn by next RD f/u  Nutrition Goal Status: new  Communication of RD Recs: other (comment) (poc)

## 2024-11-30 NOTE — CONSULTS
Inpatient consult to Physical Medicine Rehab  Consult performed by: Lou Doyle NP  Consult ordered by: Shamar Stringer PA-C  Reason for consult: Rehab      Consult received.     MARCUS Gaona, FNP-C  Physical Medicine & Rehabilitation   11/30/2024

## 2024-11-30 NOTE — ASSESSMENT & PLAN NOTE
"-Patient initially stated that she started having seizures 2 days ago. Per chart review, she presented to the ED on 11/17/24 with the same tic-like movements, which were mainly present and became more exaggerated during interactions with the ED provider.  She was given Ativan and fell asleep and when she woke up was no longer having these movements, had a snack and went home. 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    - In NCC, pt was resting comfortably in bed then began to have non-rhythmic pattern, moderate to high intensity jerking movements of her head to the left with some LUE involvement, and occasionally her left foot will kick at the same time. States that "light makes her seizures worse." Patient insists that she did not fall or hit her head on any cabinets or any other traumatic head injury, but describes frequent and intense head jerking movements over the last 2 days- discussed possibility of this being the cause of her small SDH while on Celebrex.  After this discussion, the intensity of head jerking appeared to improve. Discussed starting Keppra 500 BID for 7 days and possibility of EEG in the morning, as the caps overnight would not be compatible with her hair. Pt agreed and all other questions answered.    Keppra 500BID  x7d  Continue home lamictal   EEG in AM  "

## 2024-11-30 NOTE — ASSESSMENT & PLAN NOTE
54F PMHx HTN, HLD, DM2, hyperaldosteronism, morbid obesity s/p gastric sleeve, DAVIS, GERD, asthma, ALEN/OHS, HSV, migraine, fibromyalgia, ankylosing spondylitis, schizoaffective disorder bipolar type, anxiety, who presents as a transfer for 3mm R SDH.     Admit to NCC  q1h neuro checks and vitals   CBC, CMP, mag, and phos daily   Coags, TSH, A1c, lipid panel, UA  Echo, EKG, CXR  Repeat CTH on arrival    SBP <160   Keppra 500 BID ppx x 7d  Neurosurgery consulted   SCDs; hold chemical VTE ppx in acute setting   Hold AC/AP  PT/OT/SLP

## 2024-11-30 NOTE — PLAN OF CARE
[facility]  Initial Discharge Assessment       Primary Care Provider: Robe Britton MD    Admission Diagnosis: Subdural hematoma    Admission Date: 11/30/2024  Expected Discharge Date:     Transition of Care Barriers: None    Payor: MEDICAID / Plan: AETPsychiatric / Product Type: Managed Medicaid /     Extended Emergency Contact Information  Primary Emergency Contact: Romina Betts  Mobile Phone: [phone]  Relation: Daughter  Preferred language: English   needed? No  Secondary Emergency Contact: Alphonse Martin  Address: [address] [place] [address] [id number] [place] [address]  Mobile Phone: [phone]  Relation: Sister    Discharge Plan A: Home  Discharge Plan B: Home with family      [facility] [place] [id number] [facility]  [place] [address]  Phone: [phone] Fax: [phone]    [id number] [place] - [address] AT [facility]  [address]   [place] [address]  Phone: [phone] Fax: [phone]    [facility]  [address] [facility] [address]  Phone: [phone] Fax: [phone]    [facility] [place] - [address]  [address] [place] [address]  Phone: [phone] Fax: [phone]    [id number] [place] - [address] AT [facility]  [address]   [place] [address]  Phone: [phone] Fax: [phone]      Initial Assessment (most recent)       Adult Discharge Assessment - 11/30/24 1416          Discharge Assessment    Assessment Type Discharge Planning Assessment     Confirmed/corrected address, phone number and insurance Yes     Confirmed Demographics Correct on Facesheet     Source of Information family;health record     If unable to respond/provide information was family/caregiver contacted? Yes     Contact Name/Number Romina Betts Daughter [phone]     Does  patient/caregiver understand observation status Yes     Communicated CYNTHIA with patient/caregiver Yes;Date not available/Unable to determine     Reason For Admission SDH (subdural hematoma)     People in Home alone     Facility Arrived From: [facility]     Do you expect to return to your current living situation? Yes     Do you have help at home or someone to help you manage your care at home? Yes     Prior to hospitilization cognitive status: Unable to Assess     Current cognitive status: Unable to Assess     Walking or Climbing Stairs Difficulty no     Dressing/Bathing Difficulty no     Equipment Currently Used at Home nebulizer     Readmission within 30 days? No     Patient currently being followed by outpatient case management? No     Do you currently have service(s) that help you manage your care at home? No     Do you take prescription medications? Yes     Do you have prescription coverage? Yes     Coverage Payor: MEDICAID - [facility] -     Do you have any problems affording any of your prescribed medications? No     Is the patient taking medications as prescribed? yes     How do you get to doctors appointments? family or friend will provide     Are you on dialysis? No     Do you take coumadin? No     Discharge Plan A Home     Discharge Plan B Home with family     DME Needed Upon Discharge  other (see comments)   TBD    Discharge Plan discussed with: Adult children     Transition of Care Barriers None                   Patient sleeping. SW called patients daughter Romina Betts [phone]. Pt lives at home alone. Post hospital  stay patients two daughters and sister will be pt support person and pt has transportation at d/c with her family. There have been no hospitalizations within the last 30 days per pt. Verified pt PCP and preferred pharmacy. Pt not on Coumadin and is not receiving dialysis. All questions answered regarding case management/ discharge planning , pts daughter verbalized  understanding.    Discharge Plan A and Plan B have been determined by review of patient's clinical status, future medical and therapeutic needs, and coverage/benefits for post-acute care in coordination with multidisciplinary team members.      FOX Wang  [facility]   Ext: [phone]

## 2024-11-30 NOTE — NURSING
Patient arrived to [facility] from [facility]   by [id number]    Report received from: BEN Gutiérrez from [facility]    Type of stroke/diagnosis:  Pseudoseizures       Current symptoms: moving x 4,axo x3 w/o year, thrashing in bed    Skin Assessment done: Yes  Wounds noted: N/A    Skin Assessment Verified by:  BEN Garcia and BEN Zamudio Completed? pending    Patient Belongings on Admit: dexcom on lower abdomen, phone, phone     NCC notified: IRASEMA Sweeney

## 2024-11-30 NOTE — CONSULTS
"  [facility]  Adult Nutrition  Consult Note    SUMMARY     Recommendations  Continue diabetic; 2000 kcal diet  Nursing, continue documenting PO intake via flowsheets  If intake <50% add Boost GC BID  Monitor labs, meds, weight.  Goals: Meet % een/epn by next RD f/u  Nutrition Goal Status: new  Communication of RD Recs: other (comment) (poc)    Assessment and Plan    Nutrition Problem  Unbalanced diet partem     Related to (etiology):   Limited motivation and/or readiness to change    Signs and Symptoms (as evidenced by):   Diet recalls recorded by outpatient RD contains room not appropriate for diet, seeing outpatient RD x 2 years w/ 25# wt gain x 1 year    Interventions/Recommendations (treatment strategy):  Collaboration with other providers  Modified diet    Nutrition Diagnosis Status:   New     Reason for Assessment    Reason For Assessment: consult  Diagnosis: seizures (SDH (subdural hematoma))  General Information Comments: PMHx of anemia, anxiety, dipolar 1 disorder, GERD, asthma, type 2 DM, HTN, bipolar type schizoaffective disorder, dyslipidemia, morbid obesity, HSV, ALEN, HLD, myoclonus, and seizures. RD consulted x 2 to assess dietary needs + DM. Unable to see pt d/t MACIEJ. Admitted 9 hrs ago, no intake history available at this time. Per chart #. No current s/s of malnutrition at this time. Following. ~ Pt following w/ out patient RD & diabetes care specialist since 2022 (see past notes)  Nutrition Discharge Planning: diabetic diet    Nutrition Related Social Determinants of Health: SDOH: None Identified     Nutrition Risk Screen    Nutrition Risk Screen: no indicators present    Nutrition/Diet History    Spiritual, Cultural Beliefs, Methodist Practices, Values that Affect Care: no  Food Allergies: NKFA    Anthropometrics    Temp: 98.6 °F (37 °C)  Height: 4' 8" (142.2 cm)  Height (inches): 56 in  Weight: 91.7 kg (202 lb 2.6 oz)  Weight (lb): 202.16 lb  Ideal Body Weight " (IBW), Female: 80 lb  % Ideal Body Weight, Female (lb): 252.7 %  BMI (Calculated): 45.3  BMI Grade: greater than 40 - morbid obesity       Lab/Procedures/Meds    Pertinent Labs Reviewed: reviewed  Pertinent Labs Comments: Na: 135, glu: 138  Pertinent Medications Reviewed: reviewed  Pertinent Medications Comments: Polyethylene glycol, senna      Estimated/Assessed Needs    Weight Used For Calorie Calculations: 91.7 kg (202 lb 2.6 oz) (ABW)  Energy Calorie Requirements (kcal): 1087-0338 (11-47 kcal/kg of ABW)  Energy Need Method: Kcal/kg  Protein Requirements: 73-91 (.8-1 g/kg)  Weight Used For Protein Calculations: 91.7 kg (202 lb 2.6 oz)     Estimated Fluid Requirement Method: RDA Method  RDA Method (mL): 1008         Nutrition Prescription Ordered    Current Diet Order: Diabetid (2000 kcal)    Evaluation of Received Nutrient/Fluid Intake    I/O: n/a  Comments: LBM n/a  % Intake of Estimated Energy Needs: 0 - 25 %  % Meal Intake: 0 - 25 %    Nutrition Risk    Level of Risk/Frequency of Follow-up: moderate (f/u 1-2x/week)       Monitor and Evaluation    Food and Nutrient Intake: energy intake, food and beverage intake  Food and Nutrient Adminstration: diet order  Anthropometric Measurements: height/length, weight change, weight, body mass index  Biochemical Data, Medical Tests and Procedures: electrolyte and renal panel, gastrointestinal profile, glucose/endocrine profile, lipid profile, inflammatory profile       Nutrition Follow-Up    RD Follow-up?: Yes    Flaca Chamorro RD, LDN

## 2024-11-30 NOTE — ED NOTES
Spoke w/  at this time.  Pt accepted @[facility]. Rm: [id number].  # for report: [phone]  Accepting provider: Dr. Pinto  Ground STAT trans to be set up by  w/ cardiac monitoring

## 2024-11-30 NOTE — SUBJECTIVE & OBJECTIVE
Past Medical History:   Diagnosis Date    Abnormal Pap smear of cervix     HPV genital warts    Anemia     Anxiety     Arthritis     Asthma 10/11/2016    Bipolar 1 disorder     Diabetes mellitus, type 2     Dyslipidemia associated with type 2 diabetes mellitus 2019    Dyspnea due to COVID-19 2024    General anesthetics causing adverse effect in therapeutic use     Genital warts     GERD (gastroesophageal reflux disease)     Herpes simplex virus (HSV) infection     Hyperlipidemia     Hypertension     Hypertension complicating diabetes 2019    Migraine with aura and without status migrainosus, not intractable 2016    Mild persistent asthma without complication 10/11/2016    Morbid obesity with body mass index (BMI) of 45.0 to 49.9 in adult 2017    Myoclonus     Obstructive sleep apnea     ALEN (obstructive sleep apnea)     2L per N/C q HS    Schizoaffective disorder, bipolar type 2019    Seasonal allergic rhinitis due to pollen 2019    Seizures     Type 2 diabetes mellitus with hyperglycemia, with long-term current use of insulin 2017    Type 2 diabetes mellitus with hyperglycemia, without long-term current use of insulin 2017       Past Surgical History:   Procedure Laterality Date    abcess removed right back      ANGIOGRAM, CORONARY, WITH LEFT HEART CATHETERIZATION N/A 2024    Procedure: Angiogram, Coronary, with Left Heart Cath;  Surgeon: Jaimie Wills MD;  Location: [facility] CATH LAB;  Service: Cardiology;  Laterality: N/A;    BELT ABDOMINOPLASTY      BREAST SURGERY Bilateral     Reduction    CARPAL TUNNEL RELEASE Bilateral 2023    Procedure: RELEASE, CARPAL TUNNEL;  Surgeon: Neville Roth MD;  Location: [facility];  Service: Orthopedics;  Laterality: Bilateral;  bilateral carpal tunnel release     SECTION      X 2    COLONOSCOPY      COLONOSCOPY N/A 2018    Procedure: colonoscopy for iron deficiency anemia;  Surgeon:  Frankie Sanchez MD;  Location: [facility];  Service: Endoscopy;  Laterality: N/A;    COLONOSCOPY N/A 2/28/2018    Procedure: COLONOSCOPY;  Surgeon: Frankie Sanchez MD;  Location: [facility] ENDO;  Service: Endoscopy;  Laterality: N/A;    COLONOSCOPY N/A 3/10/2023    Procedure: COLONOSCOPY;  Surgeon: Lalita Montes De Oca MD;  Location: [facility] ENDO;  Service: Endoscopy;  Laterality: N/A;    COLONOSCOPY N/A 4/4/2024    Procedure: COLONOSCOPY;  Surgeon: Tara Og MD;  Location: [facility] ENDO;  Service: Endoscopy;  Laterality: N/A;    EPIDURAL STEROID INJECTION INTO CERVICAL SPINE N/A 1/31/2023    Procedure: C6/7 IL ROCAEL RN IV Sedation;  Surgeon: Otto Phillips MD;  Location: [facility] PAIN MGT;  Service: Pain Management;  Laterality: N/A;    EPIDURAL STEROID INJECTION INTO CERVICAL SPINE N/A 1/30/2024    Procedure: C5/6 IL ROCAEL;  Surgeon: Otto Phillips MD;  Location: [facility] PAIN MGT;  Service: Pain Management;  Laterality: N/A;    ESOPHAGOGASTRODUODENOSCOPY N/A 3/10/2023    Procedure: EGD (ESOPHAGOGASTRODUODENOSCOPY);  Surgeon: Lalita Montes De Oca MD;  Location: [facility];  Service: Endoscopy;  Laterality: N/A;    HYSTERECTOMY      w/ BSO; hypermenorrhea    INJECTION OF ANESTHETIC AGENT AROUND MEDIAL BRANCH NERVES INNERVATING CERVICAL FACET JOINT Bilateral 12/19/2023    Procedure: Bilateral C5-7 MBB (diagnostic);  Surgeon: Otto Phillips MD;  Location: [facility] PAIN MGT;  Service: Pain Management;  Laterality: Bilateral;    MOUTH SURGERY  1996    OOPHORECTOMY      hyst/bso, hypermenorrhea    ROBOT-ASSISTED CHOLECYSTECTOMY USING DA DARI XI N/A 1/3/2020    Procedure: XI ROBOTIC CHOLECYSTECTOMY;  Surgeon: Damir Driscoll MD;  Location: [facility];  Service: General;  Laterality: N/A;    TOTAL REDUCTION MAMMOPLASTY      TUBAL LIGATION         Social History     Socioeconomic History    Marital status: Single   Tobacco Use    Smoking status: Never    Smokeless tobacco: Never   Substance and Sexual Activity    Alcohol use: Yes  "    Alcohol/week: 0.0 standard drinks of alcohol     Comment: socially  No alcohol 72h prior to sx    Drug use: No    Sexual activity: Yes     Partners: Male     Birth control/protection: Surgical     Comment: john   Social History Narrative    Long-term care nurse     Social Drivers of Health     Financial Resource Strain: Medium Risk (11/30/2024)    Overall Financial Resource Strain (CARDIA)     Difficulty of Paying Living Expenses: Somewhat hard   Food Insecurity: Food Insecurity Present (11/30/2024)    Hunger Vital Sign     Worried About Running Out of Food in the Last Year: Often true     Ran Out of Food in the Last Year: Often true   Transportation Needs: No Transportation Needs (11/30/2024)    TRANSPORTATION NEEDS     Transportation : No   Physical Activity: Inactive (10/15/2024)    Exercise Vital Sign     Days of Exercise per Week: 0 days     Minutes of Exercise per Session: 0 min   Stress: Stress Concern Present (11/30/2024)    Citizen of [place] of Occupational Health - Occupational Stress Questionnaire     Feeling of Stress : Very much   Housing Stability: High Risk (11/30/2024)    Housing Stability Vital Sign     Unable to Pay for Housing in the Last Year: Yes     Homeless in the Last Year: No       Family History   Problem Relation Name Age of Onset    Diabetes Mother      Hyperlipidemia Mother      Hypertension Mother      Asthma Mother      COPD Mother      Glaucoma Mother      Thyroid disease Mother      Anesthesia problems Mother          "almost had a cardiac arrest" , blood clots    Hypertension Father      Hyperlipidemia Father      Cancer Father          Brain, lung, liver, kidney    No Known Problems Sister      Hypertension Brother      Alcohol abuse Brother      Heart disease Maternal Grandmother      Hyperlipidemia Maternal Grandmother      Hypertension Maternal Grandmother      Cataracts Maternal Grandmother      Diabetes Maternal Grandmother      Heart disease Maternal Grandfather      " Hyperlipidemia Maternal Grandfather      Hypertension Maternal Grandfather      Glaucoma Maternal Grandfather      Cancer Maternal Grandfather      Cataracts Maternal Grandfather      Macular degeneration Maternal Grandfather      Diabetes Maternal Grandfather      Heart disease Paternal Grandmother      Hyperlipidemia Paternal Grandmother      Hypertension Paternal Grandmother      Cataracts Paternal Grandmother      Heart disease Paternal Grandfather      Hyperlipidemia Paternal Grandfather      Hypertension Paternal Grandfather      Cataracts Paternal Grandfather      Breast cancer Maternal Cousin      Breast cancer Maternal Cousin      Breast cancer Maternal Cousin      Breast cancer Maternal Cousin         Review of patient's allergies indicates:   Allergen Reactions    Codeine Itching    Hydromorphone Other (See Comments)     Can't wake up for long time if taken    Slow to wake up after surgery after receiving    Aleve [naproxen sodium]      Increases BP    Ibuprofen      Increases BP    Neuromuscular blockers, steroidal Hives     some    Pregabalin Itching    Latex, natural rubber Rash    Morphine Rash     itching    Norco [hydrocodone-acetaminophen] Itching, Rash and Hallucinations    Secobarbital sodium Rash     itching    Tylox [oxycodone-acetaminophen] Rash         Medications:  Continuous Infusions:  Scheduled Meds:   atorvastatin  40 mg Oral Daily    DULoxetine  60 mg Oral BID    lamoTRIgine  200 mg Oral BID    levETIRAcetam (Keppra) IV (PEDS and ADULTS)  500 mg Intravenous Q12H    LIDOcaine  1 patch Transdermal Q24H    metoprolol succinate  50 mg Oral QAM    midazolam  2 mg Intravenous Once    mupirocin   Nasal BID    polyethylene glycol  17 g Oral Daily    senna-docusate 8.6-50 mg  1 tablet Oral BID     PRN Meds:  Current Facility-Administered Medications:     albuterol, 2 puff, Inhalation, Q6H PRN    bisacodyL, 10 mg, Rectal, Daily PRN    dextrose 10%, 12.5 g, Intravenous, PRN    dextrose 10%, 25 g,  Intravenous, PRN    famotidine, 20 mg, Oral, BID PRN    glucagon (human recombinant), 1 mg, Intramuscular, PRN    glucose, 16 g, Oral, PRN    glucose, 24 g, Oral, PRN    hydrALAZINE, 10 mg, Intravenous, Q6H PRN    insulin aspart U-100, 0-10 Units, Subcutaneous, QID (AC + HS) PRN    magnesium oxide, 800 mg, Oral, PRN    magnesium oxide, 800 mg, Oral, PRN    ondansetron, 4 mg, Intravenous, Q8H PRN    potassium bicarbonate, 35 mEq, Oral, PRN    potassium bicarbonate, 50 mEq, Oral, PRN    potassium bicarbonate, 60 mEq, Oral, PRN    potassium, sodium phosphates, 2 packet, Oral, PRN    potassium, sodium phosphates, 2 packet, Oral, PRN    potassium, sodium phosphates, 2 packet, Oral, PRN    sodium chloride 0.9%, 10 mL, Intravenous, PRN     Review of Systems  Objective:        There is no height or weight on file to calculate BMI.  Vital Signs (Most Recent):  Temp: 99.2 °F (37.3 °C) (11/30/24 0302)  Pulse: 90 (11/30/24 0502)  Resp: 15 (11/30/24 0502)  BP: 121/68 (11/30/24 0502)  SpO2: 96 % (11/30/24 0502) Vital Signs (24h Range):  Temp:  [98.9 °F (37.2 °C)-99.2 °F (37.3 °C)] 99.2 °F (37.3 °C)  Pulse:  [] 90  Resp:  [15-22] 15  SpO2:  [93 %-99 %] 96 %  BP: (114-161)/(58-88) 121/68                                 Physical Exam         Neurosurgery Physical Exam    Physical Exam:    Constitutional: whole body and head rhythmic shaking, but alert and talking during     HEENT: atraumatic/normocephalic    Cardiovascular: Regular rhythm.     Pulm: aerating well, saturating well    Abdominal: Soft.     Psych/Behavior: He is alert.     E3V5M6  AOx3  PERRL  EOMI  Face Symmetric  All extremities rhythmically jerking, but can follow commands by squeezing hands or wiggling toes       Significant Labs:  Recent Labs   Lab 11/29/24  1704 11/30/24  0256   GLU 91 138*    135*   K 4.5 4.3    104   CO2 23 24   BUN 13 11   CREATININE 0.7 0.7   CALCIUM 9.6 9.4   MG 2.0 1.9     Recent Labs   Lab 11/29/24  1704 11/30/24  0256    WBC 9.75 11.15   HGB 10.1* 9.4*   HCT 33.4* 31.0*    398     Recent Labs   Lab 11/29/24 2012 11/30/24  0256   INR 1.0 1.0   APTT 27.7 28.0     Microbiology Results (last 7 days)       ** No results found for the last 168 hours. **          All pertinent labs from the last 24 hours have been reviewed.    Significant Diagnostics:  I have reviewed and interpreted all pertinent imaging results/findings within the past 24 hours.

## 2024-11-30 NOTE — ASSESSMENT & PLAN NOTE
Patient reports recent issues with controlling her glucose after completing a dose of steroids for her ankylosing spondylitis. Her home glucose checks have been greater than 400 or less than 60 frequently over the last month.  She was previously on Ozempic for weight loss; however, she had to stop due to chronic constipation.     A1c   SSI

## 2024-11-30 NOTE — PT/OT/SLP EVAL
Occupational Therapy   Evaluation    Name: Yolanda Betts  MRN: [id number]  Admitting Diagnosis: SDH (subdural hematoma)  Recent Surgery: * No surgery found *      Recommendations:     Discharge Recommendations: High Intensity Therapy  Discharge Equipment Recommendations:  walker, rolling, bedside commode, shower chair, grab bar  Barriers to discharge:  Decreased caregiver support    Assessment:     Yolanda Betts is a 54 y.o. female with a medical diagnosis of SDH (subdural hematoma).  She presents alert with mild confusion. Pt has good overall strength, but impaired gross coordination from intermittent twitching. Performance deficits affecting function: weakness, gait instability, decreased upper extremity function, decreased lower extremity function, impaired balance, impaired endurance, decreased safety awareness, impaired self care skills, decreased coordination, impaired functional mobility.      Rehab Prognosis: Good; patient would benefit from acute skilled OT services to address these deficits and reach maximum level of function.       Plan:     Patient to be seen 4 x/week to address the above listed problems via self-care/home management, therapeutic activities, therapeutic exercises  Plan of Care Expires:    Plan of Care Reviewed with: patient    Subjective     Chief Complaint: denies  Patient/Family Comments/goals: to get better and go home    Occupational Profile:  Living Environment: lives alone in [facility] with 1 DIEGO and no HR; tub/shower combo  Previous level of function: (I) with ADL and ambulation when she's feeling well. Pt occasionally wall-walks and may not bathe for several days due to not having any assistance. Pt has groceries delivered and eats small meals that do not require cooking  Roles and Routines: caretaker to self and home  Equipment Used at Home:  (CPAP and home O2  used as needed)  Assistance upon Discharge: None    Pain/Comfort:  Pain Rating 1: 0/10  Pain Rating  Post-Intervention 1: 0/10    Patients cultural, spiritual, Jewish conflicts given the current situation: no    Objective:     Communicated with: RN prior to session.  Patient found supine with blood pressure cuff, pulse ox (continuous), telemetry, EEG, peripheral IV upon OT entry to room.    General Precautions: Standard, fall  Orthopedic Precautions:    Braces:    Respiratory Status: Room air    Occupational Performance:    Bed Mobility:    Patient completed Scooting/Bridging with contact guard assistance  Patient completed Supine to Sit with contact guard assistance  Patient completed Sit to Supine with minimum assistance    Functional Mobility/Transfers:  Patient completed Sit <> Stand Transfer with minimum assistance  with  no assistive device   Functional Mobility: NT    Activities of Daily Living:  Feeding:  stand by assistance    Grooming: minimum assistance for simulated hair combing; otherwise, CGA for tasks due to intermittent twitching  Upper Body Dressing: minimum assistance    Lower Body Dressing: maximal assistance      Cognitive/Visual Perceptual:  Pt is alert and oriented with cueing needed for year  Pt follows basic commands    Physical Exam:  No postural abnormalities  B shoulder flexion limited to ~90* 2* pain; otherwise, B UE grossly intact  B UE MMT grossly 3/5  Poor B   Unable to accurately assess sensation 2* impaired complex command following    AMPAC 6 Click ADL:  AMPAC Total Score: 15    Treatment & Education:  Pt ed on OT POC  Daily orientation provided  Pt sat EOB with SBA while engaged in self-care and occupations of choice  Pt ed on ROM ex's daily for increased overall strength and endurance to reduce risk of hospital acquired weakness  Pt ed on safety with ADL and fall risk  Reinforced use of call button to contact nursing staff for assistance with mobility    Patient left supine with all lines intact, call button in reach, bed alarm on, and RN notified    GOALS:    Multidisciplinary Problems       Occupational Therapy Goals          Problem: Occupational Therapy    Goal Priority Disciplines Outcome Interventions   Occupational Therapy Goal     OT, PT/OT Progressing    Description: Goals to be met by: 12/14/24     Patient will increase functional independence with ADLs by performing:    UE Dressing with Supervision.  LE Dressing with Stand-by Assistance.  Grooming while standing at sink with Supervision.  Toileting from toilet with Stand-by Assistance for hygiene and clothing management.   Toilet transfer to toilet with Stand-by Assistance.                         History:     Past Medical History:   Diagnosis Date    Abnormal Pap smear of cervix     HPV genital warts    Anemia     Anxiety     Arthritis     Asthma 10/11/2016    Bipolar 1 disorder     Diabetes mellitus, type 2     Dyslipidemia associated with type 2 diabetes mellitus 05/13/2019    Dyspnea due to COVID-19 09/12/2024    General anesthetics causing adverse effect in therapeutic use     Genital warts     GERD (gastroesophageal reflux disease)     Herpes simplex virus (HSV) infection     Hyperlipidemia     Hypertension     Hypertension complicating diabetes 05/05/2019    Migraine with aura and without status migrainosus, not intractable 03/21/2016    Mild persistent asthma without complication 10/11/2016    Morbid obesity with body mass index (BMI) of 45.0 to 49.9 in adult 08/03/2017    Myoclonus     Obstructive sleep apnea     ALEN (obstructive sleep apnea)     2L per N/C q HS    Schizoaffective disorder, bipolar type 04/25/2019    Seasonal allergic rhinitis due to pollen 05/13/2019    Seizures     Type 2 diabetes mellitus with hyperglycemia, with long-term current use of insulin 12/21/2017    Type 2 diabetes mellitus with hyperglycemia, without long-term current use of insulin 12/21/2017         Past Surgical History:   Procedure Laterality Date    abcess removed right back      ANGIOGRAM, CORONARY, WITH LEFT  HEART CATHETERIZATION N/A 2024    Procedure: Angiogram, Coronary, with Left Heart Cath;  Surgeon: Jaimie Wills MD;  Location: [facility] CATH LAB;  Service: Cardiology;  Laterality: N/A;    BELT ABDOMINOPLASTY  1996    BREAST SURGERY Bilateral     Reduction    CARPAL TUNNEL RELEASE Bilateral 2023    Procedure: RELEASE, CARPAL TUNNEL;  Surgeon: Neville Roth MD;  Location: [facility] OR;  Service: Orthopedics;  Laterality: Bilateral;  bilateral carpal tunnel release     SECTION      X 2    COLONOSCOPY      COLONOSCOPY N/A 2018    Procedure: colonoscopy for iron deficiency anemia;  Surgeon: Frankie Sanchez MD;  Location: [facility];  Service: Endoscopy;  Laterality: N/A;    COLONOSCOPY N/A 2018    Procedure: COLONOSCOPY;  Surgeon: Frankie Sanchez MD;  Location: [facility];  Service: Endoscopy;  Laterality: N/A;    COLONOSCOPY N/A 3/10/2023    Procedure: COLONOSCOPY;  Surgeon: Lalita Montes De Oca MD;  Location: [facility];  Service: Endoscopy;  Laterality: N/A;    COLONOSCOPY N/A 2024    Procedure: COLONOSCOPY;  Surgeon: Tara Og MD;  Location: [facility];  Service: Endoscopy;  Laterality: N/A;    EPIDURAL STEROID INJECTION INTO CERVICAL SPINE N/A 2023    Procedure: C6/7 IL ROCAEL RN IV Sedation;  Surgeon: Otto Phillips MD;  Location: [facility] PAIN MGT;  Service: Pain Management;  Laterality: N/A;    EPIDURAL STEROID INJECTION INTO CERVICAL SPINE N/A 2024    Procedure: C5/6 IL ROCAEL;  Surgeon: Otto Phillips MD;  Location: [facility] PAIN MGT;  Service: Pain Management;  Laterality: N/A;    ESOPHAGOGASTRODUODENOSCOPY N/A 3/10/2023    Procedure: EGD (ESOPHAGOGASTRODUODENOSCOPY);  Surgeon: Lalita Montes De Oca MD;  Location: [facility];  Service: Endoscopy;  Laterality: N/A;    HYSTERECTOMY      w/ BSO; hypermenorrhea    INJECTION OF ANESTHETIC AGENT AROUND MEDIAL BRANCH NERVES INNERVATING CERVICAL FACET JOINT Bilateral 2023    Procedure: Bilateral C5-7 MBB  (diagnostic);  Surgeon: Otto Phillips MD;  Location: [facility];  Service: Pain Management;  Laterality: Bilateral;    MOUTH SURGERY  1996    OOPHORECTOMY      hyst/bso, hypermenorrhea    ROBOT-ASSISTED CHOLECYSTECTOMY USING DA DARI XI N/A 1/3/2020    Procedure: XI ROBOTIC CHOLECYSTECTOMY;  Surgeon: Damir Driscoll MD;  Location: [facility];  Service: General;  Laterality: N/A;    TOTAL REDUCTION MAMMOPLASTY      TUBAL LIGATION         Time Tracking:     OT Date of Treatment: 11/29/24  OT Start Time: 1114  OT Stop Time: 1136  OT Total Time (min): 22 min    Billable Minutes:Evaluation 10  Therapeutic Activity 12    11/30/2024

## 2024-11-30 NOTE — PLAN OF CARE
Problem: Occupational Therapy  Goal: Occupational Therapy Goal  Description: Goals to be met by: 12/14/24     Patient will increase functional independence with ADLs by performing:    UE Dressing with Supervision.  LE Dressing with Stand-by Assistance.  Grooming while standing at sink with Supervision.  Toileting from toilet with Stand-by Assistance for hygiene and clothing management.   Toilet transfer to toilet with Stand-by Assistance.    Outcome: Progressing

## 2024-11-30 NOTE — H&P
"[facility]  Neurocritical Care  History & Physical    Admit Date: 11/30/2024  Service Date: 11/30/2024  Length of Stay: 0    Subjective:     Chief Complaint: SDH (subdural hematoma)    History of Present Illness: Yolanda Betts is a 54F PMHx HTN, HLD, DM2, hyperaldosteronism, morbid obesity s/p gastric sleeve, DAVIS, GERD, asthma, ALEN/OHS, HSV, migraine, fibromyalgia, ankylosing spondylitis, schizoaffective disorder bipolar type, anxiety, who presents as a transfer for 3mm R SDH. She initially presented to the [place] ED for evaluation of subjective seizures that started 2 days ago. OSH CTH revealed 3mm R SDH. She was transferred to [facility] for neurosurgical evaluation and directly admitted to [facility] for higher level of care.     Pt was resting comfortably in bed then began to have non-rhythmic, moderate to high intensity jerking movements of her head to the left once interview began and increased in frequency throughout interview. Per chart review, she presented to the ED on 11/17/24 with the same tic-like movements, which occur while she is alert and speaking and increase in intensity and frequency during examination. 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 She states that "light makes her seizures worse." Confirms 10/10 HA. She denies any n/v, LoC, new numbness, weakness, or gait instability. States that she took her celebrex yesterday. Pt denies any falls or head trauma in the last week.      Past Medical History:   Diagnosis Date    Abnormal Pap smear of cervix     HPV genital warts    Anemia     Anxiety     Arthritis     Asthma 10/11/2016    Bipolar 1 disorder     Diabetes mellitus, type 2     Dyslipidemia associated with type 2 diabetes mellitus 05/13/2019    Dyspnea due to COVID-19 09/12/2024    General anesthetics causing adverse effect in therapeutic use     Genital warts     GERD (gastroesophageal " reflux disease)     Herpes simplex virus (HSV) infection     Hyperlipidemia     Hypertension     Hypertension complicating diabetes 2019    Migraine with aura and without status migrainosus, not intractable 2016    Mild persistent asthma without complication 10/11/2016    Morbid obesity with body mass index (BMI) of 45.0 to 49.9 in adult 2017    Myoclonus     Obstructive sleep apnea     ALEN (obstructive sleep apnea)     2L per N/C q HS    Schizoaffective disorder, bipolar type 2019    Seasonal allergic rhinitis due to pollen 2019    Seizures     Type 2 diabetes mellitus with hyperglycemia, with long-term current use of insulin 2017    Type 2 diabetes mellitus with hyperglycemia, without long-term current use of insulin 2017     Past Surgical History:   Procedure Laterality Date    abcess removed right back      ANGIOGRAM, CORONARY, WITH LEFT HEART CATHETERIZATION N/A 2024    Procedure: Angiogram, Coronary, with Left Heart Cath;  Surgeon: Jaimie Wills MD;  Location: [facility] CATH LAB;  Service: Cardiology;  Laterality: N/A;    BELT ABDOMINOPLASTY  1996    BREAST SURGERY Bilateral 1996    Reduction    CARPAL TUNNEL RELEASE Bilateral 2023    Procedure: RELEASE, CARPAL TUNNEL;  Surgeon: Neville Roth MD;  Location: [facility] OR;  Service: Orthopedics;  Laterality: Bilateral;  bilateral carpal tunnel release     SECTION      X 2    COLONOSCOPY      COLONOSCOPY N/A 2018    Procedure: colonoscopy for iron deficiency anemia;  Surgeon: Frankie Sanchez MD;  Location: [facility];  Service: Endoscopy;  Laterality: N/A;    COLONOSCOPY N/A 2018    Procedure: COLONOSCOPY;  Surgeon: Frankie Sanchez MD;  Location: [facility];  Service: Endoscopy;  Laterality: N/A;    COLONOSCOPY N/A 3/10/2023    Procedure: COLONOSCOPY;  Surgeon: Lalita Montes De Oca MD;  Location: [facility];  Service: Endoscopy;  Laterality: N/A;    COLONOSCOPY N/A 2024     Procedure: COLONOSCOPY;  Surgeon: Tara Og MD;  Location: [facility] ENDO;  Service: Endoscopy;  Laterality: N/A;    EPIDURAL STEROID INJECTION INTO CERVICAL SPINE N/A 1/31/2023    Procedure: C6/7 IL ROCAEL RN IV Sedation;  Surgeon: Otto Phillips MD;  Location: [facility] PAIN MGT;  Service: Pain Management;  Laterality: N/A;    EPIDURAL STEROID INJECTION INTO CERVICAL SPINE N/A 1/30/2024    Procedure: C5/6 IL ROCAEL;  Surgeon: Otto Phillips MD;  Location: [facility] PAIN MGT;  Service: Pain Management;  Laterality: N/A;    ESOPHAGOGASTRODUODENOSCOPY N/A 3/10/2023    Procedure: EGD (ESOPHAGOGASTRODUODENOSCOPY);  Surgeon: Lalita Montes De Oca MD;  Location: [facility] ENDO;  Service: Endoscopy;  Laterality: N/A;    HYSTERECTOMY      w/ BSO; hypermenorrhea    INJECTION OF ANESTHETIC AGENT AROUND MEDIAL BRANCH NERVES INNERVATING CERVICAL FACET JOINT Bilateral 12/19/2023    Procedure: Bilateral C5-7 MBB (diagnostic);  Surgeon: Otto Phillips MD;  Location: [facility] PAIN MGT;  Service: Pain Management;  Laterality: Bilateral;    MOUTH SURGERY  1996    OOPHORECTOMY      hyst/bso, hypermenorrhea    ROBOT-ASSISTED CHOLECYSTECTOMY USING DA DARI XI N/A 1/3/2020    Procedure: XI ROBOTIC CHOLECYSTECTOMY;  Surgeon: Damir Driscoll MD;  Location: [facility] OR;  Service: General;  Laterality: N/A;    TOTAL REDUCTION MAMMOPLASTY      TUBAL LIGATION        Current Facility-Administered Medications on File Prior to Encounter   Medication Dose Route Frequency Provider Last Rate Last Admin    [COMPLETED] amLODIPine tablet 10 mg  10 mg Oral ED 1 Time Gregory Begum Jr., MD   10 mg at 11/29/24 2302    [COMPLETED] fentaNYL 50 mcg/mL injection 25 mcg  25 mcg Intravenous ED 1 Time Gregory Begum Jr., MD   25 mcg at 11/30/24 0012    [COMPLETED] levETIRAcetam injection 1,000 mg  1,000 mg Intravenous ED 1 Time Gregory Begum Jr., MD   1,000 mg at 11/29/24 2020    [COMPLETED] LORazepam injection 2 mg  2 mg Intravenous ED 1 Time Ayush Ward DO    "2 mg at 11/29/24 1704    ondansetron injection 4 mg  4 mg Intravenous Once PRN Otto Phillips MD        [COMPLETED] ondansetron injection 4 mg  4 mg Intravenous ED 1 Time Gregory Begum Jr., MD   4 mg at 11/30/24 0013     Current Outpatient Medications on File Prior to Encounter   Medication Sig Dispense Refill    albuterol (PROVENTIL) 2.5 mg /3 mL (0.083 %) nebulizer solution Take 2.5 mg by nebulization every 4 (four) hours as needed. Times a day 75 mL 1    albuterol (PROVENTIL/VENTOLIN HFA) 90 mcg/actuation inhaler Inhale 2 puffs into the lungs every 6 (six) hours as needed for Wheezing. 8.5 g 1    ALPRAZolam (XANAX) 1 MG tablet Take 1 tablet (1 mg total) by mouth 2 (two) times daily as needed for Anxiety. 60 tablet 2    amlodipine-valsartan (EXFORGE)  mg per tablet Take 1 tablet by mouth once daily. 90 tablet 1    atorvastatin (LIPITOR) 20 MG tablet Take 1 tablet (20 mg total) by mouth every evening 90 tablet 3    azelastine (ASTELIN) 137 mcg (0.1 %) nasal spray 2 sprays (274 mcg total) by Nasal route 2 (two) times daily. 30 mL 0    azelastine-fluticasone (DYMISTA) 137-50 mcg/spray Spry nassal spray Take 1 puff by Nasal route in the morning and 1 puff before bedtime. Do all this for 30 days. 23 g 3    BD INSULIN SYRINGE ULTRA-FINE 1/2 mL 30 gauge x 1/2" Syrg   0    blood-glucose meter,continuous (DEXCOM G7 ) Misc 1 each by Misc.(Non-Drug; Combo Route) route once. for 1 dose 1 each 0    blood-glucose sensor (DEXCOM G6 SENSOR) Mariela change sensor every 10 days. 3 each 11    blood-glucose sensor (DEXCOM G7 SENSOR) Mariela Use to check bg. Change every 10 days 3 each 11    blood-glucose transmitter (DEXCOM G6 TRANSMITTER) Mariela 1 each by Misc.(Non-Drug; Combo Route) route every 3 (three) months. 1 each 3    blood-glucose transmitter (DEXCOM G6 TRANSMITTER) Mariela Use as directed change every 3 months 1 each 3    celecoxib (CELEBREX) 100 MG capsule Take 1 capsule (100 mg total) by mouth 2 (two) times " daily. 60 capsule 2    cevimeline (EVOXAC) 30 mg capsule Take 1 capsule by mouth in the morning and 1 capsule at noon and 1 capsule before bedtime. Do all this for 30 days. 90 capsule 0    cloNIDine (CATAPRES) 0.1 MG tablet Take 1 tablet (0.1 mg total) by mouth 2 (two) times daily. 60 tablet 11    cyclobenzaprine (FLEXERIL) 10 MG tablet Take 1 tablet (10 mg total) by mouth 3 (three) times daily as needed (Pain). 90 tablet 11    diazePAM (VALIUM) 5 MG tablet Take 1 tablet (5 mg total) by mouth once. 45 minutes to 1 hour prior to MRI for procedural anxiety for 1 dose 1 tablet 0    DULoxetine (CYMBALTA) 60 MG capsule Take 1 capsule by mouth 2 times daily 180 capsule 3    ergocalciferol (VITAMIN D2) 50,000 unit Cap Take 1 capsule twice weekly for 3 weeks then weekly 12 capsule 3    estradioL (ESTRACE) 0.01 % (0.1 mg/gram) vaginal cream Place 1 g vaginally twice a week. 42.5 g 3    fenofibrate (TRICOR) 145 MG tablet Take 1 tablet by mouth in the morning. 30 tablet 5    fluconazole (DIFLUCAN) 150 MG Tab Take 1 tablet (150 mg total) by mouth once daily. May repeat after 72 hours if symptoms persist for 3 doses 3 tablet 0    fluticasone propionate (FLONASE) 50 mcg/actuation nasal spray Take 1 spray by Each Nostril route in the morning. 16 g 3    furosemide (LASIX) 20 MG tablet Take 1 tablet (20 mg total) by mouth once daily. 90 tablet 3    glucagon (BAQSIMI) 3 mg/actuation Spry 1 spray by Nasal route as needed (hypoglycemia). For emergency use. May repeat 1 time after 15 minutes 2 each 1    glucagon (BAQSIMI) 3 mg/actuation Parrish SPRAY 1 SPRAY IN NOSTRIL AS NEEDED FOR EMERGENCY. MAY REPEAT 1 TIME AFTER 15 MINUTES 2 each 1    insulin aspart U-100 (NOVOLOG FLEXPEN U-100 INSULIN) 100 unit/mL (3 mL) InPn pen Inject up to 10 units under the skin per sliding scale 3 times a day before meals 15 mL 3    insulin aspart U-100 (NOVOLOG U-100 INSULIN ASPART) 100 unit/mL injection To use via insulin pump. Up to 200 units every 3 days 20  "mL 5    insulin aspart U-100 (NOVOLOG) 100 unit/mL injection NovoLOG FlexPen      insulin glargine U-100, Lantus, (LANTUS SOLOSTAR U-100 INSULIN) 100 unit/mL (3 mL) InPn pen Inject up to 50 units daily in event of pump failure 15 mL 3    insulin pump cart,automated,BT (OMNIPOD 5 G6 PODS, GEN 5,) Crtg 1 each by Misc.(Non-Drug; Combo Route) route every 48 hours. 45 each 3    insulin syringe-needle U-100 0.3 mL 30 gauge x 5/16" Syrg 1 each by Misc.(Non-Drug; Combo Route) route Every 3 (three) days. 100 each 2    lamoTRIgine (LAMICTAL) 200 MG tablet Take 1 tablet (200 mg total) by mouth 2 (two) times daily. 60 tablet 2    levocetirizine (XYZAL) 5 MG tablet Take 1 tablet (5 mg total) by mouth every evening. 30 tablet 11    LIDOcaine (LIDODERM) 5 % Place 2 patches onto the skin every 12 (twelve) hours as needed (pain). Remove & Discard patch within 12 hours or as directed by MD 60 patch 11    lifitegrast (XIIDRA) 5 % Dpet Place 1 drop into both eyes 2 (two) times daily. 60 each 12    lumateperone (CAPLYTA) 10.5 mg Cap Take 1 capsule (10.5 mg total) by mouth once daily. 30 capsule 1    magic mouthwash diphen/antac/lidoc/nysta Swish and spit 5 mLs every 4 (four) hours as needed. 118 mL 0    magnesium hydroxide 400 mg/5 ml (MILK OF MAGNESIA) 400 mg/5 mL Susp Take 5 mLs by mouth every 4 (four) hours as needed.      magnesium oxide (MAG-OX) 400 mg (241.3 mg magnesium) tablet Take 1 tablet (400 mg total) by mouth once daily. 90 tablet 3    methylPREDNISolone (MEDROL DOSEPACK) 4 mg tablet use as directed 21 tablet 1    metoprolol succinate (TOPROL-XL) 50 MG 24 hr tablet Take 1 tablet (50 mg total) by mouth every morning. 30 tablet 11    metroNIDAZOLE (FLAGYL) 500 MG tablet Take 1 tablet (500 mg total) by mouth every 12 (twelve) hours for 7 days 14 tablet 0    nebulizer and compressor (COMP-AIR NEBULIZER COMPRESSOR) Mariela use as directed 1 each 0    omeprazole (PRILOSEC) 40 MG capsule Take 1 capsule (40 mg total) by mouth once " "daily. 90 capsule 1    OZEMPIC 2 mg/dose (8 mg/3 mL) PnIj Inject 2 mg into the skin every 7 days.      pen needle, diabetic (BD ULTRA-FINE MINI PEN NEEDLE) 31 gauge x 3/16" Ndle Use 1 a day in event of pump failure 100 each 11    polyethylene glycol (GLYCOLAX) 17 gram/dose powder Take 17 g by mouth 2 (two) times daily. 1020 g 11    promethazine (PHENERGAN) 25 MG tablet Take 25 mg by mouth every 6 (six) hours as needed.      secukinumab (COSENTYX PEN, 2 PENS,) 150 mg/mL PnIj Inject 300 mg into the skin every 14 (fourteen) days. 4 mL 11    semaglutide (OZEMPIC) 2 mg/dose (8 mg/3 mL) PnIj Inject 2 mg into the skin every 7 days. 3 mL 2    semaglutide (OZEMPIC) 2 mg/dose (8 mg/3 mL) PnIj Inject 2 mg under the skin every 7 days. 3 mL 2    spironolactone (ALDACTONE) 25 MG tablet Take 1 tablet by mouth in the morning. 90 tablet 1    sulfaSALAzine (AZULFIDINE) 500 MG EC tablet Take 1 tablet (500 mg total) by mouth 2 (two) times daily. 60 tablet 0    SYMBICORT 160-4.5 mcg/actuation HFAA Inhale 2 puffs into the lungs in the morning and 2 puffs before bedtime. 2 puffs every 12 hours 10.2 g 2    triamcinolone acetonide 0.1% (KENALOG) 0.1 % paste Apply coating 2 times daily 5 g 12    VITAMIN D2 1,250 mcg (50,000 unit) capsule Take 1 capsule by mouth Take once weekly. 1 capsule every 14 days 4 capsule 3    zolpidem (AMBIEN) 5 MG Tab Take 1 tablet (5 mg total) by mouth nightly as needed (anxiety). 30 tablet 2    [DISCONTINUED] clonazePAM (KLONOPIN) 1 MG tablet Take 1 tablet by mouth daily 30 tablet 0    [DISCONTINUED] glucagon, human recombinant, (GLUCAGON EMERGENCY KIT, HUMAN,) 1 mg SolR Inject 1 mg into the muscle as needed. Emergency use 1 each 1    [DISCONTINUED] valsartan (DIOVAN) 320 MG tablet Take 1 tablet (320 mg total) by mouth once daily. 90 tablet 3      Allergies: Codeine; Hydromorphone; Aleve [naproxen sodium]; Ibuprofen; Neuromuscular blockers, steroidal; Pregabalin; Latex, natural rubber; Morphine; Norco " "[hydrocodone-acetaminophen]; Secobarbital sodium; and Tylox [oxycodone-acetaminophen]  Family History   Problem Relation Name Age of Onset    Diabetes Mother      Hyperlipidemia Mother      Hypertension Mother      Asthma Mother      COPD Mother      Glaucoma Mother      Thyroid disease Mother      Anesthesia problems Mother          "almost had a cardiac arrest" , blood clots    Hypertension Father      Hyperlipidemia Father      Cancer Father          Brain, lung, liver, kidney    No Known Problems Sister      Hypertension Brother      Alcohol abuse Brother      Heart disease Maternal Grandmother      Hyperlipidemia Maternal Grandmother      Hypertension Maternal Grandmother      Cataracts Maternal Grandmother      Diabetes Maternal Grandmother      Heart disease Maternal Grandfather      Hyperlipidemia Maternal Grandfather      Hypertension Maternal Grandfather      Glaucoma Maternal Grandfather      Cancer Maternal Grandfather      Cataracts Maternal Grandfather      Macular degeneration Maternal Grandfather      Diabetes Maternal Grandfather      Heart disease Paternal Grandmother      Hyperlipidemia Paternal Grandmother      Hypertension Paternal Grandmother      Cataracts Paternal Grandmother      Heart disease Paternal Grandfather      Hyperlipidemia Paternal Grandfather      Hypertension Paternal Grandfather      Cataracts Paternal Grandfather      Breast cancer Maternal Cousin      Breast cancer Maternal Cousin      Breast cancer Maternal Cousin      Breast cancer Maternal Cousin       Social History     Tobacco Use    Smoking status: Never    Smokeless tobacco: Never   Substance Use Topics    Alcohol use: Yes     Alcohol/week: 0.0 standard drinks of alcohol     Comment: socially  No alcohol 72h prior to sx    Drug use: No     Review of Systems   Constitutional:  Negative for chills, fatigue and fever.   HENT:  Negative for trouble swallowing.    Eyes:  Positive for photophobia. Negative for visual " disturbance.   Respiratory:  Negative for shortness of breath.    Cardiovascular:  Negative for chest pain and palpitations.   Gastrointestinal:  Positive for constipation. Negative for abdominal pain, nausea and vomiting.   Genitourinary:  Negative for difficulty urinating.   Musculoskeletal:  Positive for back pain. Negative for gait problem, neck pain and neck stiffness.   Neurological:  Positive for seizures and headaches. Negative for dizziness, tremors, syncope, speech difficulty, weakness and numbness.   Psychiatric/Behavioral:  Negative for confusion.      Objective:     Vitals:         Temp  Min: 98.9 °F (37.2 °C)  Max: 98.9 °F (37.2 °C)  Pulse  Min: 93  Max: 113  BP  Min: 125/87  Max: 161/84  MAP (mmHg)  Min: 99  Max: 116  Resp  Min: 17  Max: 22  SpO2  Min: 96 %  Max: 99 %    No intake/output data recorded.            Physical Exam  Vitals and nursing note reviewed.   Constitutional:       General: She is not in acute distress.     Appearance: She is obese.   HENT:      Head: Normocephalic and atraumatic.      Right Ear: External ear normal.      Left Ear: External ear normal.      Nose: Nose normal.      Mouth/Throat:      Mouth: Mucous membranes are dry.      Pharynx: Oropharynx is clear.   Eyes:      Extraocular Movements: Extraocular movements intact.      Conjunctiva/sclera: Conjunctivae normal.      Pupils: Pupils are equal, round, and reactive to light.   Cardiovascular:      Rate and Rhythm: Tachycardia present.   Pulmonary:      Effort: Pulmonary effort is normal. No respiratory distress.   Abdominal:      General: There is no distension.      Palpations: Abdomen is soft.      Tenderness: There is no abdominal tenderness. There is no guarding.   Musculoskeletal:      Cervical back: Normal range of motion.      Right lower leg: No edema.      Left lower leg: No edema.   Skin:     General: Skin is warm and dry.      Capillary Refill: Capillary refill takes less than 2 seconds.   Neurological:       Comments: E3 V5 M6  Drowsy but briskly awakens to voice. Once awake, pt develops a head jerking movement to the left, involving the L shoulder, and it is occasionally accompanied by LUE and LLE movement.   Oriented x person, place, and year. Speech fluent, no aphasia or dysarthria. Following commands.   CN II-XII grossly intact, specifically:  PERRL  EOMI  Visual fields intact   No facial asymmetry. Facial sensation intact in V1-V3.  Tongue midline.   Shoulder shrug symmetric  Motor:  Moving all extremities spontaneously and antigravity.   When testing strength, pt visibly and audibly strains to lift her arms off of bed but immediately drops back to the bed stating she cannot lift them from pain and exhaustion.  Sensation intact to light touch throughout.            Today I personally reviewed pertinent medications, lines/drains/airways, imaging, cardiology results, laboratory results, microbiology results, notably:  TSH wnl   Na 135    Lipid panel tgl 207  Coags wnl  WBC 11  H/H 9.4/31  CK 63  Etoh <10  Lactic 1.4    Utox + benzos- she is prescribed xanax     CT 1744 on 11/29/24  1.  Small right lateral convexity subdural hematoma measuring 3 mm in thickness.  Negative for midline shift.  2.  Negative for acute intracranial process otherwise.  Negative for other areas of hemorrhage, or skull fracture.  3.  Stable findings as noted above.      Assessment/Plan:     Neuro  * SDH (subdural hematoma)  54F PMHx HTN, HLD, DM2, hyperaldosteronism, morbid obesity s/p gastric sleeve, DAVIS, GERD, asthma, ALEN/OHS, HSV, migraine, fibromyalgia, ankylosing spondylitis, schizoaffective disorder bipolar type, anxiety, who presents as a transfer for 3mm R SDH.     Admit to NCC  q1h neuro checks and vitals   CBC, CMP, mag, and phos daily   Coags, TSH, A1c, lipid panel, UA  Echo, EKG, CXR  Repeat CTH on arrival    SBP <160   Keppra 500 BID ppx x 7d  Neurosurgery consulted   SCDs; hold chemical VTE ppx in acute setting   Hold  "AC/AP  PT/OT/SLP      Abnormal head movements  -Patient initially stated that she started having seizures 2 days ago. Per chart review, she presented to the ED on 11/17/24 with the same tic-like movements, which were mainly present and became more exaggerated during interactions with the ED provider.  She was given Ativan and fell asleep and when she woke up was no longer having these movements, had a snack and went home. 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    - In NCC, pt was resting comfortably in bed then began to have non-rhythmic pattern, moderate to high intensity jerking movements of her head to the left with some LUE involvement, and occasionally her left foot will kick at the same time. States that "light makes her seizures worse." Patient insists that she did not fall or hit her head on any cabinets or any other traumatic head injury, but describes frequent and intense head jerking movements over the last 2 days- discussed possibility of this being the cause of her small SDH while on Celebrex.  After this discussion, the intensity of head jerking appeared to improve. Discussed starting Keppra 500 BID for 7 days and possibility of EEG in the morning, as the caps overnight would not be compatible with her hair. Pt agreed and all other questions answered.    Keppra 500BID  x7d  Continue home lamictal   EEG in AM    Psychiatric  Schizoaffective disorder, bipolar type  From most recent psych note:  Lamictal 200 mg bid  Xanax 1 mg bid -hold in acute setting  Ambien 5 mg hs- hold in acute setting  Cymbalta 60 mg bid   Caplyta 10.5 mg qd -not on formulary and patient does not have with her    Generalized anxiety disorder  See schizoaffective disorder    Pulmonary  Moderate persistent asthma without complication  PRN nebs    Cardiac/Vascular  Dyslipidemia associated with type 2 diabetes mellitus  Lipid panel   Atorvastatin 20    Essential hypertension  SBP < 160  Home metoprolol succinate " 50mg qd  Patient states she was recently prescribed clonidine but her SBP has been  at home so she has not taken it  Prn hydralazine    Endocrine  BMI 40.0-44.9, adult  Has been on Ozempic and Mounjaro and experienced issues with constipation    RD consult    Hyperaldosteronism  Diagnosed in 2019    Hold spironolactone and Lasix    Type 2 diabetes mellitus with hyperglycemia, with long-term current use of insulin  Patient reports recent issues with controlling her glucose after completing a dose of steroids for her ankylosing spondylitis. Her home glucose checks have been greater than 400 or less than 60 frequently over the last month.  She was previously on Ozempic for weight loss; however, she had to stop due to chronic constipation.     A1c   SSI    GI  Irritable bowel syndrome with both constipation and diarrhea  Bowel reg     GERD (gastroesophageal reflux disease)  On omeprazole at home    Prn famotidine    Orthopedic  Ankylosing spondylitis  On Cosentyx every 2 weeks.  Recently completed steroids for back pain.    Hold Celebrex  PRN pain regimen  Lidocaine patch    Fibromyalgia  Continue Cymbalta  Hold Flexeril in acute period    Other  Nocturnal hypoxemia due to obesity - WITHOUT ALEN  CPAP qhs          The patient is being Prophylaxed for:  Venous Thromboembolism with: Mechanical  Stress Ulcer with: Not Applicable   Ventilator Pneumonia with: not applicable    Activity Orders            Turn patient starting at 11/30 0400    Elevate HOB starting at 11/30 0248    Diet NPO Except for: Medication: NPO starting at 11/30 0248          Full Code    Critical care time spent on the evaluation and treatment of severe organ dysfunction, review of pertinent labs and imaging studies, discussions with consulting providers and discussions with patient/family: 45 minutes.    Nora Joe, PA-C  Neurocritical Care  [facility]

## 2024-12-01 LAB
ALBUMIN SERPL BCP-MCNC: 2.8 G/DL (ref 3.5–5.2)
ALP SERPL-CCNC: 80 U/L (ref 40–150)
ALT SERPL W/O P-5'-P-CCNC: 16 U/L (ref 10–44)
ANION GAP SERPL CALC-SCNC: 7 MMOL/L (ref 8–16)
ASCENDING AORTA: 2.35 CM
AST SERPL-CCNC: 15 U/L (ref 10–40)
AV AREA BY CONTINUOUS VTI: 2.1 CM2
AV INDEX (PROSTH): 0.67
AV LVOT MEAN GRADIENT: 4 MMHG
AV LVOT PEAK GRADIENT: 6 MMHG
AV MEAN GRADIENT: 5.7 MMHG
AV PEAK GRADIENT: 10.2 MMHG
AV VALVE AREA BY VELOCITY RATIO: 2.6 CM²
AV VALVE AREA: 2.1 CM2
AV VELOCITY RATIO: 0.81
BASOPHILS # BLD AUTO: 0 K/UL (ref 0–0.2)
BASOPHILS NFR BLD: 0 % (ref 0–1.9)
BILIRUB SERPL-MCNC: 0.1 MG/DL (ref 0.1–1)
BSA FOR ECHO PROCEDURE: 1.9 M2
BUN SERPL-MCNC: 10 MG/DL (ref 6–20)
CALCIUM SERPL-MCNC: 8.9 MG/DL (ref 8.7–10.5)
CHLORIDE SERPL-SCNC: 104 MMOL/L (ref 95–110)
CO2 SERPL-SCNC: 25 MMOL/L (ref 23–29)
CREAT SERPL-MCNC: 0.7 MG/DL (ref 0.5–1.4)
CV ECHO LV RWT: 0.45 CM
DIFFERENTIAL METHOD BLD: ABNORMAL
DOP CALC AO PEAK VEL: 1.6 M/S
DOP CALC AO VTI: 28.8 CM
DOP CALC LVOT AREA: 3.1 CM2
DOP CALC LVOT DIAMETER: 2 CM
DOP CALC LVOT PEAK VEL: 1.3 M/S
DOP CALC LVOT STROKE VOLUME: 60.9 CM3
DOP CALCLVOT PEAK VEL VTI: 19.4 CM
E WAVE DECELERATION TIME: 156.53 MS
E/A RATIO: 0.96
E/E' RATIO: 6.15 M/S
ECHO EF ESTIMATED: 75 %
ECHO LV POSTERIOR WALL: 1 CM (ref 0.6–1.1)
EJECTION FRACTION: 65 %
EOSINOPHIL # BLD AUTO: 0 K/UL (ref 0–0.5)
EOSINOPHIL NFR BLD: 0 % (ref 0–8)
ERYTHROCYTE [DISTWIDTH] IN BLOOD BY AUTOMATED COUNT: 16.6 % (ref 11.5–14.5)
EST. GFR  (NO RACE VARIABLE): >60 ML/MIN/1.73 M^2
FRACTIONAL SHORTENING: 43.2 % (ref 28–44)
GLUCOSE SERPL-MCNC: 110 MG/DL (ref 70–110)
HCT VFR BLD AUTO: 32.7 % (ref 37–48.5)
HGB BLD-MCNC: 9.7 G/DL (ref 12–16)
IMM GRANULOCYTES # BLD AUTO: 0.04 K/UL (ref 0–0.04)
IMM GRANULOCYTES NFR BLD AUTO: 0.7 % (ref 0–0.5)
INTERVENTRICULAR SEPTUM: 0.9 CM (ref 0.6–1.1)
IVRT: 104.66 MS
LA MAJOR: 5.13 CM
LA MINOR: 5.15 CM
LA WIDTH: 3.86 CM
LEFT ATRIUM SIZE: 3.53 CM
LEFT ATRIUM VOLUME INDEX MOD: 32 ML/M2
LEFT ATRIUM VOLUME INDEX: 33.4 ML/M2
LEFT ATRIUM VOLUME MOD: 57.03 ML
LEFT ATRIUM VOLUME: 59.53 CM3
LEFT INTERNAL DIMENSION IN SYSTOLE: 2.5 CM (ref 2.1–4)
LEFT VENTRICLE DIASTOLIC VOLUME INDEX: 50.31 ML/M2
LEFT VENTRICLE DIASTOLIC VOLUME: 89.56 ML
LEFT VENTRICLE MASS INDEX: 77.4 G/M2
LEFT VENTRICLE SYSTOLIC VOLUME INDEX: 12.4 ML/M2
LEFT VENTRICLE SYSTOLIC VOLUME: 22.11 ML
LEFT VENTRICULAR INTERNAL DIMENSION IN DIASTOLE: 4.4 CM (ref 3.5–6)
LEFT VENTRICULAR MASS: 137.8 G
LV LATERAL E/E' RATIO: 4.71
LV SEPTAL E/E' RATIO: 8.89
LYMPHOCYTES # BLD AUTO: 2.8 K/UL (ref 1–4.8)
LYMPHOCYTES NFR BLD: 45.3 % (ref 18–48)
MAGNESIUM SERPL-MCNC: 2 MG/DL (ref 1.6–2.6)
MCH RBC QN AUTO: 23.1 PG (ref 27–31)
MCHC RBC AUTO-ENTMCNC: 29.7 G/DL (ref 32–36)
MCV RBC AUTO: 78 FL (ref 82–98)
MONOCYTES # BLD AUTO: 0.4 K/UL (ref 0.3–1)
MONOCYTES NFR BLD: 6.5 % (ref 4–15)
MV PEAK A VEL: 0.83 M/S
MV PEAK E VEL: 0.8 M/S
NEUTROPHILS # BLD AUTO: 2.9 K/UL (ref 1.8–7.7)
NEUTROPHILS NFR BLD: 47.5 % (ref 38–73)
NRBC BLD-RTO: 0 /100 WBC
OHS CV RV/LV RATIO: 0.68 CM
OHS QRS DURATION: 78 MS
OHS QTC CALCULATION: 425 MS
PHOSPHATE SERPL-MCNC: 3.6 MG/DL (ref 2.7–4.5)
PLATELET # BLD AUTO: 421 K/UL (ref 150–450)
PMV BLD AUTO: 9.8 FL (ref 9.2–12.9)
POCT GLUCOSE: 108 MG/DL (ref 70–110)
POCT GLUCOSE: 135 MG/DL (ref 70–110)
POCT GLUCOSE: 135 MG/DL (ref 70–110)
POCT GLUCOSE: 137 MG/DL (ref 70–110)
POTASSIUM SERPL-SCNC: 4.3 MMOL/L (ref 3.5–5.1)
PROT SERPL-MCNC: 6.1 G/DL (ref 6–8.4)
RA MAJOR: 4.2 CM
RA PRESSURE ESTIMATED: 3 MMHG
RA WIDTH: 2.63 CM
RBC # BLD AUTO: 4.2 M/UL (ref 4–5.4)
RIGHT VENTRICLE DIASTOLIC BASEL DIMENSION: 3 CM
RV TISSUE DOPPLER FREE WALL SYSTOLIC VELOCITY 1 (APICAL 4 CHAMBER VIEW): 16.72 CM/S
SINUS: 2.77 CM
SODIUM SERPL-SCNC: 136 MMOL/L (ref 136–145)
STJ: 2.34 CM
TDI LATERAL: 0.17 M/S
TDI SEPTAL: 0.09 M/S
TDI: 0.13 M/S
TRICUSPID ANNULAR PLANE SYSTOLIC EXCURSION: 2.42 CM
WBC # BLD AUTO: 6.11 K/UL (ref 3.9–12.7)
Z-SCORE OF LEFT VENTRICULAR DIMENSION IN END DIASTOLE: -1.12
Z-SCORE OF LEFT VENTRICULAR DIMENSION IN END SYSTOLE: -1.55

## 2024-12-01 PROCEDURE — 63600175 PHARM REV CODE 636 W HCPCS

## 2024-12-01 PROCEDURE — 94761 N-INVAS EAR/PLS OXIMETRY MLT: CPT

## 2024-12-01 PROCEDURE — 97162 PT EVAL MOD COMPLEX 30 MIN: CPT

## 2024-12-01 PROCEDURE — 97535 SELF CARE MNGMENT TRAINING: CPT

## 2024-12-01 PROCEDURE — 51798 US URINE CAPACITY MEASURE: CPT

## 2024-12-01 PROCEDURE — 83735 ASSAY OF MAGNESIUM: CPT

## 2024-12-01 PROCEDURE — 25000003 PHARM REV CODE 250: Performed by: PSYCHIATRY & NEUROLOGY

## 2024-12-01 PROCEDURE — 25000003 PHARM REV CODE 250

## 2024-12-01 PROCEDURE — 80053 COMPREHEN METABOLIC PANEL: CPT

## 2024-12-01 PROCEDURE — 95718 EEG PHYS/QHP 2-12 HR W/VEEG: CPT | Mod: ,,, | Performed by: PSYCHIATRY & NEUROLOGY

## 2024-12-01 PROCEDURE — 11000001 HC ACUTE MED/SURG PRIVATE ROOM

## 2024-12-01 PROCEDURE — 84100 ASSAY OF PHOSPHORUS: CPT

## 2024-12-01 PROCEDURE — 51701 INSERT BLADDER CATHETER: CPT

## 2024-12-01 PROCEDURE — 85025 COMPLETE CBC W/AUTO DIFF WBC: CPT | Performed by: PSYCHIATRY & NEUROLOGY

## 2024-12-01 RX ORDER — ENOXAPARIN SODIUM 100 MG/ML
40 INJECTION SUBCUTANEOUS EVERY 24 HOURS
Status: DISCONTINUED | OUTPATIENT
Start: 2024-12-01 | End: 2024-12-02 | Stop reason: HOSPADM

## 2024-12-01 RX ORDER — ACETAMINOPHEN 325 MG/1
650 TABLET ORAL ONCE
Status: COMPLETED | OUTPATIENT
Start: 2024-12-01 | End: 2024-12-02

## 2024-12-01 RX ADMIN — INSULIN ASPART 10 UNITS: 100 INJECTION, SOLUTION INTRAVENOUS; SUBCUTANEOUS at 07:12

## 2024-12-01 RX ADMIN — ENOXAPARIN SODIUM 40 MG: 100 INJECTION SUBCUTANEOUS at 05:12

## 2024-12-01 RX ADMIN — LAMOTRIGINE 200 MG: 100 TABLET ORAL at 08:12

## 2024-12-01 RX ADMIN — INSULIN GLARGINE 25 UNITS: 100 INJECTION, SOLUTION SUBCUTANEOUS at 07:12

## 2024-12-01 RX ADMIN — SODIUM CHLORIDE 1000 ML: 9 INJECTION, SOLUTION INTRAVENOUS at 10:12

## 2024-12-01 RX ADMIN — MUPIROCIN: 20 OINTMENT TOPICAL at 09:12

## 2024-12-01 RX ADMIN — MUPIROCIN: 20 OINTMENT TOPICAL at 08:12

## 2024-12-01 RX ADMIN — METOPROLOL SUCCINATE 50 MG: 25 TABLET, EXTENDED RELEASE ORAL at 07:12

## 2024-12-01 RX ADMIN — INSULIN ASPART 10 UNITS: 100 INJECTION, SOLUTION INTRAVENOUS; SUBCUTANEOUS at 05:12

## 2024-12-01 RX ADMIN — ALPRAZOLAM 0.5 MG: 0.25 TABLET ORAL at 08:12

## 2024-12-01 RX ADMIN — ATORVASTATIN CALCIUM 40 MG: 40 TABLET, FILM COATED ORAL at 09:12

## 2024-12-01 RX ADMIN — LEVETIRACETAM 500 MG: 100 INJECTION, SOLUTION INTRAVENOUS at 09:12

## 2024-12-01 RX ADMIN — LAMOTRIGINE 200 MG: 100 TABLET ORAL at 09:12

## 2024-12-01 RX ADMIN — DULOXETINE HYDROCHLORIDE 60 MG: 30 CAPSULE, DELAYED RELEASE ORAL at 09:12

## 2024-12-01 RX ADMIN — POLYETHYLENE GLYCOL 3350 17 G: 17 POWDER, FOR SOLUTION ORAL at 08:12

## 2024-12-01 RX ADMIN — LEVETIRACETAM 500 MG: 100 INJECTION, SOLUTION INTRAVENOUS at 10:12

## 2024-12-01 RX ADMIN — LIDOCAINE 1 PATCH: 50 PATCH CUTANEOUS at 05:12

## 2024-12-01 RX ADMIN — SENNOSIDES AND DOCUSATE SODIUM 1 TABLET: 50; 8.6 TABLET ORAL at 09:12

## 2024-12-01 RX ADMIN — SENNOSIDES AND DOCUSATE SODIUM 1 TABLET: 50; 8.6 TABLET ORAL at 08:12

## 2024-12-01 RX ADMIN — ALPRAZOLAM 0.5 MG: 0.25 TABLET ORAL at 09:12

## 2024-12-01 RX ADMIN — DULOXETINE HYDROCHLORIDE 60 MG: 30 CAPSULE, DELAYED RELEASE ORAL at 08:12

## 2024-12-01 RX ADMIN — INSULIN ASPART 10 UNITS: 100 INJECTION, SOLUTION INTRAVENOUS; SUBCUTANEOUS at 11:12

## 2024-12-01 NOTE — PROCEDURES
24 hr. Video EEG Monitoring    Date/Time: 12/1/2024 9:39 AM    Performed by: Toño Nation MD  Authorized by: Shamar Stringer PA-C      ICU EEG/VIDEO MONITORING REPORT    DATE OF SERVICE: 11/30/24-12/1/24  EEG NUMBER: FH   REQUESTED BY: Millie  LOCATION OF SERVICE: [facility] [place]    METHODOLOGY   Electroencephalographic (EEG) recording is with electrodes placed according to the International 10-20 placement system.  Thirty two (32) channels of digital signal are simultaneously recorded from the scalp and may include EKG, EMG, and/or eye monitors.   Recording band pass was 0.1 to 512 hz.  Digital video recording of the patient is simultaneously recorded with the EEG.  The nursing staff report clinical symptoms and may press an event button when the patient has symptoms of clinical interest to the treating physicians.  EEG and video recording is stored and archived in digital format.  The entire recording is visually reviewed and the times identified by computer analysis as being spikes or seizures are reviewed again.  Activation procedures which include photic stimulation, hyperventilation and instructing patients to perform simple task are done in selected patients.   Compresses spectral analysis (CSA) is also performed on the activity recorded from each individual channel.  This is displayed as a power display of frequencies from 0 to 30 Hz over time.   The CSA analysis is done and displayed continuously.  This is reviewed for asymmetries in power between homologous areas of the scalp and for presence of changes in power which canbe seen when seizures occur.  Sections of suspected abnormalities on the CSA is then compared with the original EEG recording.     [employer] software was also utilized in the review of this study.  This software suite analyzes the EEG recording in multiple domains.  Coherence and rhythmicity is computed to identify EEG sections which may contain organized seizures.  Each channel undergoes  analysis to detect presence of spike and sharp waves which have special and morphological characteristic of epileptic activity.  The routine EEG recording is converted from spacial into frequency domain.  This is then displayed comparing homologous areas to identify areas of significant asymmetry.  Algorithm to identify non-cortically generated artifact is used to separate eye movement, EMG and other artifact from the EEG.      Recording Times  Start on 11/30/24 at 05:38  Stop on 11/30/24 at 06:59  Start on 11/30/24 at 07:00  Stop on 11/30/24 at 09:36  Start on 11/30/24 at 14:44  Stop on 12/1/24 at 07:00  A total of 23 hours  of EEG was recorded.    EEG FINDINGS  The record shows a good  organization at rest, consisting of a 9 Hz posterior dominant rhythm with good  reactivity. There is mild bilateral beta activity. There is occasional theta range slowing over the right hemisphere.    Drowsiness is characterized by attenuation of the background, vertex waves, and bilateral theta slowing. Stage II sleep is characterized by slowing, vertex waves, and symmetric sleep spindles.     Provocative maneuvers including hyperventilation and photic stimulation were not performed.     EKG recording shows a sinus rhythm.    There is one push button or clinical event at 12:48 for pelvic thrusting, arms jerking in an alternating fashion, side to side head shaking, and whole body flopping movements. No epileptiform discharges or electrographic seizures are seen during this time.     IMPRESSION:  Abnormal study due to mild focal slowing over the right hemisphere consistent with focal cortical dysfunction which may be attributable to underlying structural defect such as mass or infarct.

## 2024-12-01 NOTE — ASSESSMENT & PLAN NOTE
Yolanda Betts is a 54 y.o. female patient with a PMHx of anemia, anxiety, dipolar 1 disorder, GERD, asthma, type 2 DM, HTN, bipolar type schizoaffective disorder, dyslipidemia, morbid obesity, HSV, ALEN, HLD, myoclonus, and seizures, presents to hospital with self reported seizures, possibly pseudoseizures, consulted to NSGY for SDH.       CTH with very small, right focal SDH 3 mm in thickness  CTH repeat stable    EEG no seizures    - ICU status  - Q1 hour neurochecks  - SBP < 160  - EEG per ICU  - AEDs per ICU  - OK for SQH

## 2024-12-01 NOTE — HOSPITAL COURSE
54F PMHx HTN, HLD, DM2, hyperaldosteronism, morbid obesity s/p gastric sleeve, DAVIS, GERD, asthma, ALEN/OHS, HSV, migraine, fibromyalgia, ankylosing spondylitis, schizoaffective disorder bipolar type, anxiety, who presents as a transfer for 3mm R SDH. She initially presented to the [place] ED for evaluation of subjective seizures that started 2 days ago. OSH CTH revealed 3mm R SDH. She was transferred to [facility] [place] for [place]surgical evaluation. Patient was admitted to [facility] for right subdural hematoma and subjective seizures. NSGY was consulted. Repeat CTH showed stable bleed without mass effect or midline shift. She had another subjective seizure and repeat EEG confirmed no epileptiform activity. 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 PT/OT recommended low intensity therapy. Patient would like outpatient PT/OT referral.   Patient stable and ready for discharge.   Referrals placed for neurology, psychiatry, endocrinology, and PT/OT.   Post hospital follow up apt requested.   Neurosurgery will follow outpatient to repeat CT head

## 2024-12-01 NOTE — PT/OT/SLP EVAL
"Physical Therapy Evaluation    Patient Name:  Yolanda Betts   MRN:  [id number]    Recommendations:     Discharge Recommendations: Low Intensity Therapy   Discharge Equipment Recommendations: none   Barriers to discharge: Decreased caregiver support    Assessment:     Yolanda Betts is a 54 y.o. female admitted with a medical diagnosis of SDH (subdural hematoma).  She presents with the following impairments/functional limitations: weakness, impaired functional mobility, impaired balance, decreased coordination, pain, edema, impaired endurance Patient with good participation this date, motivated to ambulate to restroom however limited by length of EEG. Able to complete bed mobility, standing balance and steps at EOB. Functioning below independent baseline, and will benefit from low intensity therapy post-acute. Patient will continue to benefit from skilled PT during this admit to address BLE strength and endurance deficits, and maximize independence with functional mobility.    Rehab Prognosis: Good; patient would benefit from acute skilled PT services to address these deficits and reach maximum level of function.    Recent Surgery: * No surgery found *      Plan:     During this hospitalization, patient to be seen 4 x/week to address the identified rehab impairments via gait training, neuromuscular re-education, therapeutic activities, therapeutic exercises and progress toward the following goals:    Plan of Care Expires:  12/31/24    Subjective     Chief Complaint: "I really just want to walk to the bathroom"  Patient/Family Comments/goals: improve independence level  Pain/Comfort:  Pain Rating 1: 8/10  Location - Side 1: Bilateral  Location - Orientation 1: generalized  Location 1: back  Pain Addressed 1: Nurse notified, Reposition  Pain Rating Post-Intervention 1: 8/10    Patients cultural, spiritual, Mu-ism conflicts given the current situation: no    Living Environment:  Patient lives alone in a [facility] " with 1 DIEGO. Tub-shower combo located in restroom.  Prior to admission, patients level of function was independence, a;htouhg with some limited endurance. Finds herself using walls or furniture to safely ambulate sometimes. Equipment used at home: none.  DME owned (not currently used):  oxygen, CPAP .  Upon discharge, patient will have assistance from unknown.    Objective:     Communicated with RN prior to session.  Patient found HOB elevated with blood pressure cuff, pulse ox (continuous), telemetry, EEG, peripheral IV  upon PT entry to room.    General Precautions: Standard, fall  Orthopedic Precautions:N/A   Braces: N/A  Respiratory Status: Room air    Exams:  Cognitive Exam:  Patient is oriented to Person, Place, Time, and Situation  Gross Motor Coordination:  WFL  Postural Exam:  Patient presented with the following abnormalities:    -       Rounded shoulders  -       Forward head  Sensation:    -       Intact  RLE ROM: WFL  RLE Strength: WFL  LLE ROM: WFL  LLE Strength: WFL    Functional Mobility:  Bed Mobility:     Scooting: stand by assistance  Supine to Sit: stand by assistance  Sit to Supine: stand by assistance  Transfers:     Sit to Stand:  contact guard assistance with hand-held assist from EOB  Gait: 4 R lateral steps with CGA + B HHA  No LOB, decreased step length, decreased jam  Balance:   Sitting: good at EOB, able to brush teeth with light L lateral lean but self-corrects with cueing  Standing: CGA + B HHA      AM-PAC 6 CLICK MOBILITY  Total Score:17       Treatment & Education:  Patient required co-evaluation with OT for highest quality care d/t decreased activity tolerance and increased level of assistance necessary.  Patient educated on calling for assistance for any needs to improve overall safety awareness.  All items placed within reach, and notified on call light usage for assistance with any needs for fall prevention.  Patient educated on importance of EOB activity to promote overall  endurance.    Patient left HOB elevated with all lines intact, call button in reach, and RN present.    GOALS:   Multidisciplinary Problems       Physical Therapy Goals          Problem: Physical Therapy    Goal Priority Disciplines Outcome Interventions   Physical Therapy Goal     PT, PT/OT Progressing    Description: Goals to be met by: 12/15/24     Patient will increase functional independence with mobility by performin. Supine to sit with Modified [place]  2. Sit to supine with Modified [place]  3. Sit to stand transfer with Supervision  4. Bed to chair transfer with Supervision using LRAD, if needed  5. Gait  x 150 feet with Supervision using LRAD, if needed.   6. Ascend/Descend 4 inch curb step with Contact Guard Assistance using LRAD, if needed.  7. Lower extremity exercise program x15 reps per handout, with assistance as needed                         History:     Past Medical History:   Diagnosis Date    Abnormal Pap smear of cervix     HPV genital warts    Anemia     Anxiety     Arthritis     Asthma 10/11/2016    Bipolar 1 disorder     Diabetes mellitus, type 2     Dyslipidemia associated with type 2 diabetes mellitus 2019    Dyspnea due to COVID-19 2024    General anesthetics causing adverse effect in therapeutic use     Genital warts     GERD (gastroesophageal reflux disease)     Herpes simplex virus (HSV) infection     Hyperlipidemia     Hypertension     Hypertension complicating diabetes 2019    Migraine with aura and without status migrainosus, not intractable 2016    Mild persistent asthma without complication 10/11/2016    Morbid obesity with body mass index (BMI) of 45.0 to 49.9 in adult 2017    Myoclonus     Obstructive sleep apnea     ALEN (obstructive sleep apnea)     2L per N/C q HS    Schizoaffective disorder, bipolar type 2019    Seasonal allergic rhinitis due to pollen 2019    Seizures     Type 2 diabetes mellitus with hyperglycemia,  with long-term current use of insulin 2017    Type 2 diabetes mellitus with hyperglycemia, without long-term current use of insulin 2017       Past Surgical History:   Procedure Laterality Date    abcess removed right back      ANGIOGRAM, CORONARY, WITH LEFT HEART CATHETERIZATION N/A 2024    Procedure: Angiogram, Coronary, with Left Heart Cath;  Surgeon: Jaimie Wills MD;  Location: [facility];  Service: Cardiology;  Laterality: N/A;    BELT ABDOMINOPLASTY      BREAST SURGERY Bilateral     Reduction    CARPAL TUNNEL RELEASE Bilateral 2023    Procedure: RELEASE, CARPAL TUNNEL;  Surgeon: Neville Roth MD;  Location: [facility] OR;  Service: Orthopedics;  Laterality: Bilateral;  bilateral carpal tunnel release     SECTION      X 2    COLONOSCOPY      COLONOSCOPY N/A 2018    Procedure: colonoscopy for iron deficiency anemia;  Surgeon: Frankie Sanchez MD;  Location: [facility];  Service: Endoscopy;  Laterality: N/A;    COLONOSCOPY N/A 2018    Procedure: COLONOSCOPY;  Surgeon: Frankie Sanchez MD;  Location: [facility];  Service: Endoscopy;  Laterality: N/A;    COLONOSCOPY N/A 3/10/2023    Procedure: COLONOSCOPY;  Surgeon: Lalita Montes De Oca MD;  Location: [facility];  Service: Endoscopy;  Laterality: N/A;    COLONOSCOPY N/A 2024    Procedure: COLONOSCOPY;  Surgeon: Tara Og MD;  Location: [facility];  Service: Endoscopy;  Laterality: N/A;    EPIDURAL STEROID INJECTION INTO CERVICAL SPINE N/A 2023    Procedure: C6/7 IL ROCAEL RN IV Sedation;  Surgeon: Otto Phillips MD;  Location: [facility] PAIN [facility]GT;  Service: Pain Management;  Laterality: N/A;    EPIDURAL STEROID INJECTION INTO CERVICAL SPINE N/A 2024    Procedure: C5/6 IL ROCAEL;  Surgeon: Otto Phillips MD;  Location: [facility] PAIN [facility]GT;  Service: Pain Management;  Laterality: N/A;    ESOPHAGOGASTRODUODENOSCOPY N/A 3/10/2023    Procedure: EGD (ESOPHAGOGASTRODUODENOSCOPY);  Surgeon: Lalita APARICIO  MD Tavon;  Location: [facility] ENDO;  Service: Endoscopy;  Laterality: N/A;    HYSTERECTOMY      w/ BSO; hypermenorrhea    INJECTION OF ANESTHETIC AGENT AROUND MEDIAL BRANCH NERVES INNERVATING CERVICAL FACET JOINT Bilateral 12/19/2023    Procedure: Bilateral C5-7 MBB (diagnostic);  Surgeon: Otto Phillips MD;  Location: [facility] MGT;  Service: Pain Management;  Laterality: Bilateral;    MOUTH SURGERY  1996    OOPHORECTOMY      hyst/bso, hypermenorrhea    ROBOT-ASSISTED CHOLECYSTECTOMY USING DA DARI XI N/A 1/3/2020    Procedure: XI ROBOTIC CHOLECYSTECTOMY;  Surgeon: Damir Driscoll MD;  Location: [facility] OR;  Service: General;  Laterality: N/A;    TOTAL REDUCTION MAMMOPLASTY      TUBAL LIGATION         Time Tracking:     PT Received On: 12/01/24  PT Start Time: 1035     PT Stop Time: 1048  PT Total Time (min): 13 min     Billable Minutes: Evaluation 13      12/01/2024

## 2024-12-01 NOTE — ASSESSMENT & PLAN NOTE
SBP < 160  Home toprol  Patient states she was recently prescribed clonidine but her SBP has been  at home so she has not taken it  PRN hydralazine

## 2024-12-01 NOTE — ASSESSMENT & PLAN NOTE
54F PMHx HTN, HLD, DM2, hyperaldosteronism, morbid obesity s/p gastric sleeve, DAVIS, GERD, asthma, ALEN/OHS, HSV, migraine, fibromyalgia, ankylosing spondylitis, schizoaffective disorder bipolar type, anxiety, who presents as a transfer for 3mm R SDH.     Admit to [facility]  Q1H neuro checks and vitals   CBC, CMP, mag, and phos daily   Coags, TSH, A1c, lipid panel, UA  Echo, EKG, CXR  Repeat CTH stable   SBP <160   Keppra 500 BID ppx x 7d  Neurosurgery consulted   SCDs; hold chemical VTE ppx in acute setting   Hold AC/AP  PT/OT/SLP

## 2024-12-01 NOTE — ASSESSMENT & PLAN NOTE
On Cosentyx every 2 weeks.  Recently completed steroids for back pain.    Consider continuing Celebrex  Lidocaine patch

## 2024-12-01 NOTE — HOSPITAL COURSE
Patient was admitted to [facility] for right subdural hematoma and subjective seizures. NSGY was consulted. Repeat CTH showed stable bleed without mass effect or midline shift. She had another subjective seizure and repeat EEG confirmed no epileptiform activity. Plan to step down to  for PT/OT and psychiatric work up.

## 2024-12-01 NOTE — NURSING TRANSFER
Nursing Transfer Note       Transfer To 947    Transfer via bed    Transfer with cardiac monitoring    Transported by BEN Astudillo    Medicines sent: Yes    Chart sent with patient: Yes    Belongings sent with patient: call phone and      Notified: daughter    Bedside Neuro assessment performed: Yes    Does the patient have altered skin integrity? no       Bedside Handoff given to: RN    Upon arrival to floor: cardiac monitor applied, patient oriented to room, call bell in reach, and bed in lowest position

## 2024-12-01 NOTE — PLAN OF CARE
[facility] Care Plan    POC reviewed with Yolanda Betts and family at 0300. Patient verbalized understanding. Questions and concerns addressed. No acute events today. Pt progressing toward goals. Will continue to monitor. See below and flowsheets for full assessment and VS info.     -Xanax PRN given 1x.     Is this a stroke patient? no    Neuro:  Mar Coma Scale  Best Eye Response: 4-->(E4) spontaneous  Best Motor Response: 6-->(M6) obeys commands  Best Verbal Response: 5-->(V5) oriented  [place] Coma Scale Score: 15  Pupil PERRLA: yes     24 hr Temp:  [98 °F (36.7 °C)-98.6 °F (37 °C)]     CV:   Rhythm: normal sinus rhythm  BP goals:   SBP < 160  MAP > 65    Resp:           Plan: N/A    GI/:     Diet/Nutrition Received: consistent carb/diabetic diet  Last Bowel Movement: 11/28/24  Voiding Characteristics: external catheter    Intake/Output Summary (Last 24 hours) at 12/1/2024 0542  Last data filed at 11/30/2024 1215  Gross per 24 hour   Intake --   Output 875 ml   Net -875 ml     Unmeasured Output  Stool Occurrence: 0    Labs/Accuchecks:  Recent Labs   Lab 11/30/24  0256   WBC 11.15   RBC 4.00   HGB 9.4*   HCT 31.0*         Recent Labs   Lab 12/01/24  0051      K 4.3   CO2 25      BUN 10   CREATININE 0.7   ALKPHOS 80   ALT 16   AST 15   BILITOT 0.1      Recent Labs   Lab 11/30/24  0256   INR 1.0   APTT 28.0      Recent Labs   Lab 11/29/24  1704   CPK 63       Electrolytes: N/A - electrolytes WDL  Accuchecks: ACHS    Gtts:      LDA/Wounds:    Jayy Risk Assessment  Sensory Perception: 4-->no impairment  Moisture: 3-->occasionally moist  Activity: 1-->bedfast  Mobility: 4-->no limitation  Nutrition: 3-->adequate  Friction and Shear: 3-->no apparent problem  Jayy Score: 18  Is your jayy score 12 or less? no          Restraints:    N/A    WCTM

## 2024-12-01 NOTE — PROCEDURES
Procedures  ICU EEG/VIDEO MONITORING REPORT    DATE OF SERVICE: 12/1/24  EEG NUMBER: FH   REQUESTED BY: Millie  LOCATION OF SERVICE: [facility] [place]    METHODOLOGY   Electroencephalographic (EEG) recording is with electrodes placed according to the International 10-20 placement system.  Thirty two (32) channels of digital signal are simultaneously recorded from the scalp and may include EKG, EMG, and/or eye monitors.   Recording band pass was 0.1 to 512 hz.  Digital video recording of the patient is simultaneously recorded with the EEG.  The nursing staff report clinical symptoms and may press an event button when the patient has symptoms of clinical interest to the treating physicians.  EEG and video recording is stored and archived in digital format.  The entire recording is visually reviewed and the times identified by computer analysis as being spikes or seizures are reviewed again.  Activation procedures which include photic stimulation, hyperventilation and instructing patients to perform simple task are done in selected patients.   Compresses spectral analysis (CSA) is also performed on the activity recorded from each individual channel.  This is displayed as a power display of frequencies from 0 to 30 Hz over time.   The CSA analysis is done and displayed continuously.  This is reviewed for asymmetries in power between homologous areas of the scalp and for presence of changes in power which canbe seen when seizures occur.  Sections of suspected abnormalities on the CSA is then compared with the original EEG recording.     [employer] software was also utilized in the review of this study.  This software suite analyzes the EEG recording in multiple domains.  Coherence and rhythmicity is computed to identify EEG sections which may contain organized seizures.  Each channel undergoes analysis to detect presence of spike and sharp waves which have special and morphological characteristic of epileptic activity.  The  routine EEG recording is converted from spacial into frequency domain.  This is then displayed comparing homologous areas to identify areas of significant asymmetry.  Algorithm to identify non-cortically generated artifact is used to separate eye movement, EMG and other artifact from the EEG.      Recording Times  Start on 12/1/24 at 07:01  Stop on 12/1/24 at 12:37  A total of 5 hours  of EEG was recorded.    EEG FINDINGS  The record shows a good  organization at rest, consisting of a 9 Hz posterior dominant rhythm with good  reactivity. There is mild bilateral beta activity. There is occasional theta range slowing over the right hemisphere.    Drowsiness is characterized by attenuation of the background, vertex waves, and bilateral theta slowing. Stage II sleep is characterized by slowing, vertex waves, and symmetric sleep spindles.     Provocative maneuvers including hyperventilation and photic stimulation were not performed.     EKG recording shows a sinus rhythm.    There is no  push button or clinical event.    IMPRESSION:  Abnormal study due to mild focal slowing over the right hemisphere consistent with focal cortical dysfunction which may be attributable to underlying structural defect such as mass or infarct.

## 2024-12-01 NOTE — PT/OT/SLP PROGRESS
Occupational Therapy   Co-Treatment    Name: Yolanda Betts  MRN: [id number]  Admitting Diagnosis:  SDH (subdural hematoma)       Recommendations:     Discharge Recommendations: Low Intensity Therapy  Discharge Equipment Recommendations:  none  Barriers to discharge:  Decreased caregiver support    Assessment:     Yolanda Betts is a 54 y.o. female with a medical diagnosis of SDH (subdural hematoma).  She presents with progress towards goals as evidenced by significant improvement in bed mobility, transfers and ADL. Pt more alert and requiring significantly less assistance this session. Performance deficits affecting function are impaired endurance, impaired self care skills, impaired functional mobility, decreased coordination. Pt benefits from co-treatment with PT to accommodate pt's activity tolerance and progression with therapy.      Rehab Prognosis:  Good; patient would benefit from acute skilled OT services to address these deficits and reach maximum level of function.       Plan:     Patient to be seen 3 x/week to address the above listed problems via self-care/home management, therapeutic activities, therapeutic exercises  Plan of Care Expires:    Plan of Care Reviewed with: patient    Subjective     Chief Complaint: denies  Patient/Family Comments/goals: to get out of the bed  Pain/Comfort:  Pain Rating 1: 8/10  Location - Side 1: Bilateral  Location - Orientation 1: generalized  Location 1: back  Pain Addressed 1: Reposition, Distraction  Pain Rating Post-Intervention 1: 8/10    Objective:     Communicated with: RN prior to session.  Patient found supine with blood pressure cuff, pulse ox (continuous), telemetry, EEG, peripheral IV upon OT entry to room.    General Precautions: Standard, fall    Orthopedic Precautions:   Braces:    Respiratory Status: Room air     Occupational Performance:     Bed Mobility:    Patient completed Scooting/Bridging with stand by assistance  Patient completed Supine  to Sit with stand by assistance  Patient completed Sit to Supine with stand by assistance     Functional Mobility/Transfers:  Patient completed Sit <> Stand Transfer with contact guard assistance  with  hand-held assist   Functional Mobility: CGA for side steps along EOB    Activities of Daily Living:  Feeding:  independence    Grooming: independence    Upper Body Dressing: stand by assistance    Lower Body Dressing: contact guard assistance for stand phase (simulated); SBA donning socks in bed      [facility] 6 Click ADL: 20    Treatment & Education:  Pt ed on OT POC  Daily orientation reviewed  Pt sat EOB with SBA while engaged in ADL  Pt ed on ROM ex's daily for increased overall strength and endurance to reduce risk of hospital acquired weakness  Pt ed on safety with ADL and fall risk  Reinforced use of call button to contact nursing staff for assistance with mobility    Patient left HOB elevated with all lines intact, call button in reach, and RN notified    GOALS:   Multidisciplinary Problems       Occupational Therapy Goals          Problem: Occupational Therapy    Goal Priority Disciplines Outcome Interventions   Occupational Therapy Goal     OT, PT/OT Progressing    Description: Goals to be met by: 12/14/24     Patient will increase functional independence with ADLs by performing:    UE Dressing with Supervision.  LE Dressing with Stand-by Assistance.  Grooming while standing at sink with Supervision.  Toileting from toilet with Stand-by Assistance for hygiene and clothing management.   Toilet transfer to toilet with Stand-by Assistance.                         Time Tracking:     OT Date of Treatment: 12/01/24  OT Start Time: 1035  OT Stop Time: 1048  OT Total Time (min): 13 min    Billable Minutes:Self Care/Home Management 13               12/1/2024

## 2024-12-01 NOTE — PROGRESS NOTES
"[facility]  Neurocritical Care  Progress Note    Admit Date: 11/30/2024  Service Date: 12/01/2024  Length of Stay: 1    Subjective:     Chief Complaint: SDH (subdural hematoma)    History of Present Illness: Yolanda Betts is a 54F PMHx HTN, HLD, DM2, hyperaldosteronism, morbid obesity s/p gastric sleeve, DAVIS, GERD, asthma, ALEN/OHS, HSV, migraine, fibromyalgia, ankylosing spondylitis, schizoaffective disorder bipolar type, anxiety, who presents as a transfer for 3mm R SDH. She initially presented to the [place] ED for evaluation of subjective seizures that started 2 days ago. OSH CTH revealed 3mm R SDH. She was transferred to [facility] for neurosurgical evaluation and directly admitted to [facility] for higher level of care.     Pt was resting comfortably in bed then began to have non-rhythmic, moderate to high intensity jerking movements of her head to the left once interview began and increased in frequency throughout interview. Per chart review, she presented to the ED on 11/17/24 with the same tic-like movements, which occur while she is alert and speaking and increase in intensity and frequency during examination. 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 She states that "light makes her seizures worse." Confirms 10/10 HA. She denies any n/v, LoC, new numbness, weakness, or gait instability. States that she took her celebrex yesterday. Pt denies any falls or head trauma in the last week.    Hospital Course: Patient was admitted to [facility] for right subdural hematoma and subjective seizures. NSGY was consulted. Repeat CTH showed stable bleed without mass effect or midline shift. She had another subjective seizure and repeat EEG confirmed no epileptiform activity. Plan to step down to  for PT/OT and psychiatric work up.     Interval History: NAEON. VSSAF. CBC/CMP WNL. Repeat EEG negative for seizure. Discontinued EEG. " Plan to SD to .     Review of Systems   Eyes:  Positive for photophobia.   Gastrointestinal:  Positive for constipation.   Genitourinary:  Negative for difficulty urinating.   Musculoskeletal:  Positive for arthralgias and back pain.   Neurological:  Positive for headaches. Negative for dizziness.     Objective:     Vitals:    Temp: 98.1 °F (36.7 °C)  Pulse: 82  Rhythm: normal sinus rhythm  BP: (!) 124/59  MAP (mmHg): 83  Resp: 20  SpO2: (!) 94 %    Temp  Min: 98 °F (36.7 °C)  Max: 98.6 °F (37 °C)  Pulse  Min: 80  Max: 100  BP  Min: 108/63  Max: 163/72  MAP (mmHg)  Min: 78  Max: 107  Resp  Min: 12  Max: 59  SpO2  Min: 90 %  Max: 97 %    11/30 0701 - 12/01 0700  In: -   Out: 875 [Urine:875]   Unmeasured Output  Stool Occurrence: 0        Physical Exam  Vitals and nursing note reviewed.   Constitutional:       General: She is not in acute distress.     Appearance: She is obese.   HENT:      Head: Normocephalic and atraumatic.      Right Ear: External ear normal.      Left Ear: External ear normal.      Nose: Nose normal.      Mouth/Throat:      Mouth: Mucous membranes are dry.      Pharynx: Oropharynx is clear.   Eyes:      Extraocular Movements: Extraocular movements intact.      Conjunctiva/sclera: Conjunctivae normal.      Pupils: Pupils are equal, round, and reactive to light.   Cardiovascular:      Rate and Rhythm: Tachycardia present.   Pulmonary:      Effort: Pulmonary effort is normal.   Abdominal:      General: Abdomen is flat.      Palpations: Abdomen is soft.   Musculoskeletal:      Cervical back: Normal range of motion.   Skin:     General: Skin is warm and dry.      Capillary Refill: Capillary refill takes less than 2 seconds.   Neurological:      Mental Status: She is alert.      Comments:     E4 V5 M6  Drowsy but briskly awakens to voice.  Alert and oriented x3. Speech fluent, no aphasia or dysarthria. Following commands.   CN II-XII grossly intact, specifically:  PERRLA. EOMI  Visual fields intact    No facial asymmetry. Facial sensation intact in V1-V3.  Tongue midline.   Shoulder shrug symmetric    Motor:  PEARL & AG  Strength is weak in all extremities which patient attributes to pain  SILT            Today I personally reviewed pertinent medications, lines/drains/airways, imaging, cardiology results, laboratory results, microbiology results, notably:    CT 1744 on 11/29/24  1.  Small right lateral convexity subdural hematoma measuring 3 mm in thickness.  Negative for midline shift.  2.  Negative for acute intracranial process otherwise.  Negative for other areas of hemorrhage, or skull fracture.  3.  Stable findings as noted above.      Assessment/Plan:     Neuro  * SDH (subdural hematoma)  54F PMHx HTN, HLD, DM2, hyperaldosteronism, morbid obesity s/p gastric sleeve, DAVIS, GERD, asthma, ALEN/OHS, HSV, migraine, fibromyalgia, ankylosing spondylitis, schizoaffective disorder bipolar type, anxiety, who presents as a transfer for 3mm R SDH.     Admit to [facility]CC  Q1H neuro checks and vitals   CBC, CMP, mag, and phos daily   Coags, TSH, A1c, lipid panel, UA  Echo, EKG, CXR  Repeat CTH stable   SBP <160   Keppra 500 BID ppx x 7d  Neurosurgery consulted   SCDs; hold chemical VTE ppx in acute setting   Hold AC/AP  PT/OT/SLP      Abnormal head movements  Appears to be psychogenic pseudoseizures    Keppra 500 BID  x7d  Continue home lamictal   Repeat EEG negative for seizure    Psychiatric  Schizoaffective disorder, bipolar type  From most recent psych note:  Lamictal 200 mg bid  Ambien 5 mg hs- hold in acute setting  Cymbalta 60 mg bid   Caplyta 10.5 mg qd -not on formulary and patient does not have with her    Continue xanax 0.5 BID PRN for anxiety    Generalized anxiety disorder  See schizoaffective disorder    Pulmonary  Moderate persistent asthma without complication  - PRN albuterol inhaler  - Consider PRN nebs    Cardiac/Vascular  Dyslipidemia associated with type 2 diabetes mellitus  Lipid panel   Continue  Atorvastatin 40 QD    Essential hypertension  SBP < 160  Home toprol  Patient states she was recently prescribed clonidine but her SBP has been  at home so she has not taken it  PRN hydralazine    Endocrine  BMI 40.0-44.9, adult  Has been on Ozempic and Mounjaro and experienced issues with constipation    RD consult    Hyperaldosteronism  Diagnosed in 2019    Consider continuing spironolactone and Lasix    Type 2 diabetes mellitus with hyperglycemia, with long-term current use of insulin  Patient reports recent issues with controlling her glucose after completing a dose of steroids for her ankylosing spondylitis. Her home glucose checks have been greater than 400 or less than 60 frequently over the last month.  She was previously on Ozempic for weight loss; however, she had to stop due to chronic constipation.     A1c   MDSSI    GI  Irritable bowel syndrome with both constipation and diarrhea  On senna and miralax     GERD (gastroesophageal reflux disease)  On omeprazole at home    PRN pepcid    Orthopedic  Ankylosing spondylitis  On Cosentyx every 2 weeks.  Recently completed steroids for back pain.    Consider continuing Celebrex  Lidocaine patch    Fibromyalgia  Continue Cymbalta  Consider continuing flexiril     Other  Nocturnal hypoxemia due to obesity - WITHOUT ALEN  CPAP QHS          The patient is being Prophylaxed for:  Venous Thromboembolism with: Chemical lovenox  Stress Ulcer with: None PRN pepcid  Ventilator Pneumonia with: not applicable    Activity Orders            Diet diabetic 2000 Calories (up to 75 gm per meal); Standard Tray: Diabetic starting at 11/30 0741    Turn patient starting at 11/30 0400    Elevate HOB starting at 11/30 0248          Full Code    Yoli Gunn MD  Neurocritical Care  [facility]

## 2024-12-01 NOTE — ASSESSMENT & PLAN NOTE
Patient reports recent issues with controlling her glucose after completing a dose of steroids for her ankylosing spondylitis. Her home glucose checks have been greater than 400 or less than 60 frequently over the last month.  She was previously on Ozempic for weight loss; however, she had to stop due to chronic constipation.     A1c   MDSSI

## 2024-12-01 NOTE — PLAN OF CARE
Problem: Physical Therapy  Goal: Physical Therapy Goal  Description: Goals to be met by: 12/15/24     Patient will increase functional independence with mobility by performin. Supine to sit with Modified [place]  2. Sit to supine with Modified [place]  3. Sit to stand transfer with Supervision  4. Bed to chair transfer with Supervision using LRAD, if needed  5. Gait  x 150 feet with Supervision using LRAD, if needed.   6. Ascend/Descend 4 inch curb step with Contact Guard Assistance using LRAD, if needed.  7. Lower extremity exercise program x15 reps per handout, with assistance as needed    PT Evaluation completed 2024   PT goals and POC established.     Outcome: Progressing

## 2024-12-01 NOTE — SUBJECTIVE & OBJECTIVE
Interval history: 12/1: CTH stable. EEG negative for seizures, no shaking episodes this morning     Review of Systems  Objective:     Weight: 91.6 kg (202 lb)  Body mass index is 45.31 kg/m².  Vital Signs (Most Recent):  Temp: 98 °F (36.7 °C) (12/01/24 0301)  Pulse: 95 (12/01/24 0601)  Resp: (!) 24 (12/01/24 0601)  BP: 125/61 (12/01/24 0601)  SpO2: 96 % (12/01/24 0601) Vital Signs (24h Range):  Temp:  [98 °F (36.7 °C)-98.6 °F (37 °C)] 98 °F (36.7 °C)  Pulse:  [] 95  Resp:  [12-59] 24  SpO2:  [90 %-96 %] 96 %  BP: (108-163)/(56-79) 125/61                                 Physical Exam         Neurosurgery Physical Exam    Physical Exam:    Constitutional: NAD    HEENT: atraumatic/normocephalic    Cardiovascular: Regular rhythm.     Pulm: aerating well, saturating well    Abdominal: Soft.     Psych/Behavior: He is alert.     E4V5M6  AOx3  PERRL  EOMI  Face Symmetric  FC FS x4      Significant Labs:  Recent Labs   Lab 11/29/24 1704 11/30/24 0256 12/01/24  0051   GLU 91 138* 110    135* 136   K 4.5 4.3 4.3    104 104   CO2 23 24 25   BUN 13 11 10   CREATININE 0.7 0.7 0.7   CALCIUM 9.6 9.4 8.9   MG 2.0 1.9 2.0     Recent Labs   Lab 11/29/24 1704 11/30/24 0256 12/01/24  0552   WBC 9.75 11.15 6.11   HGB 10.1* 9.4* 9.7*   HCT 33.4* 31.0* 32.7*    398 421     Recent Labs   Lab 11/29/24 2012 11/30/24  0256   INR 1.0 1.0   APTT 27.7 28.0     Microbiology Results (last 7 days)       ** No results found for the last 168 hours. **          All pertinent labs from the last 24 hours have been reviewed.    Significant Diagnostics:  I have reviewed and interpreted all pertinent imaging results/findings within the past 24 hours.

## 2024-12-01 NOTE — PLAN OF CARE
[facility] Care Plan  POC reviewed with Yolanda Betts and family at 1800. Patient and Family verbalized understanding. Questions and concerns addressed. No acute events today. Pt progressing toward goals. Will continue to monitor. See below and flowsheets for full assessment and VS info.     -cEEG continued, no seizures noted to correlate with jerking activity   -UA sent  -Echo complete  -PRN zofran x 1  -Straight cath x 1 for urinary retention   -Pt's insulin pump removed      Is this a stroke patient? no    Neuro:  [place] Coma Scale  Best Eye Response: 4-->(E4) spontaneous  Best Motor Response: 6-->(M6) obeys commands  Best Verbal Response: 5-->(V5) oriented  [place] Coma Scale Score: 15  Pupil PERRLA: yes     24 hr Temp:  [98.4 °F (36.9 °C)-99.2 °F (37.3 °C)]     CV:   Rhythm: normal sinus rhythm  BP goals:   SBP < 160  MAP > 65    Resp:           Plan: N/A    GI/:     Diet/Nutrition Received: consistent carb/diabetic diet  Last Bowel Movement: 11/28/24  Voiding Characteristics: external catheter    Intake/Output Summary (Last 24 hours) at 11/30/2024 1831  Last data filed at 11/30/2024 1215  Gross per 24 hour   Intake --   Output 875 ml   Net -875 ml     Unmeasured Output  Stool Occurrence: 0    Labs/Accuchecks:  Recent Labs   Lab 11/30/24  0256   WBC 11.15   RBC 4.00   HGB 9.4*   HCT 31.0*         Recent Labs   Lab 11/30/24  0256   *   K 4.3   CO2 24      BUN 11   CREATININE 0.7   ALKPHOS 88   ALT 19   AST 16   BILITOT 0.3      Recent Labs   Lab 11/30/24  0256   INR 1.0   APTT 28.0      Recent Labs   Lab 11/29/24  1704   CPK 63       Electrolytes: Electrolytes replaced  Accuchecks: none    Gtts:      LDA/Wounds:    Jayy Risk Assessment  Sensory Perception: 4-->no impairment  Moisture: 3-->occasionally moist  Activity: 1-->bedfast  Mobility: 4-->no limitation  Nutrition: 3-->adequate  Friction and Shear: 3-->no apparent problem  Jayy Score: 18    Is your jayy score 12 or less?  no            Restraints:        LAURA

## 2024-12-01 NOTE — ASSESSMENT & PLAN NOTE
From most recent psych note:  Lamictal 200 mg bid  Ambien 5 mg hs- hold in acute setting  Cymbalta 60 mg bid   Caplyta 10.5 mg qd -not on formulary and patient does not have with her    Continue xanax 0.5 BID PRN for anxiety

## 2024-12-01 NOTE — ASSESSMENT & PLAN NOTE
Appears to be psychogenic pseudoseizures    Keppra 500 BID  x7d  Continue home lamictal   Repeat EEG negative for seizure

## 2024-12-01 NOTE — PLAN OF CARE
Hospital medicine acceptance note    54F PMHx HTN, HLD, DM2, hyperaldosteronism, morbid obesity s/p gastric sleeve, DAVIS, GERD, asthma, ALEN/OHS, HSV, migraine, fibromyalgia, ankylosing spondylitis, schizoaffective disorder bipolar type, anxiety, who presents as a transfer for 3mm R SDH. She initially presented to the [place] ED for evaluation of subjective seizures that started 2 days ago. OSH CTH revealed 3mm R SDH. She was transferred [place] [facility] [place]. Patient was admitted to [facility] for right subdural hematoma and subjective seizures. NSGY was consulted. Repeat CTH showed stable bleed without mass effect or midline shift. She had another subjective seizure and repeat EEG confirmed no epileptiform activity. Plan to step down to  for PT/OT and psychiatric work up.     #subdural hematoma  #psychogenic pseudoseizures  #schizoaffective disorder  #fibromyalgia   #anxiety   #ankylosing spondylitis  #T2DM

## 2024-12-01 NOTE — SUBJECTIVE & OBJECTIVE
Interval History: NAEON. VSSAF. CBC/CMP WNL. Repeat EEG negative for seizure. Plan to SD to .     Review of Systems   Eyes:  Positive for photophobia.   Gastrointestinal:  Positive for constipation.   Genitourinary:  Negative for difficulty urinating.   Musculoskeletal:  Positive for arthralgias and back pain.   Neurological:  Positive for headaches. Negative for dizziness.     Objective:     Vitals:    Temp: 98.1 °F (36.7 °C)  Pulse: 82  Rhythm: normal sinus rhythm  BP: (!) 124/59  MAP (mmHg): 83  Resp: 20  SpO2: (!) 94 %    Temp  Min: 98 °F (36.7 °C)  Max: 98.6 °F (37 °C)  Pulse  Min: 80  Max: 100  BP  Min: 108/63  Max: 163/72  MAP (mmHg)  Min: 78  Max: 107  Resp  Min: 12  Max: 59  SpO2  Min: 90 %  Max: 97 %    11/30 0701 - 12/01 0700  In: -   Out: 875 [Urine:875]   Unmeasured Output  Stool Occurrence: 0        Physical Exam  Vitals and nursing note reviewed.   Constitutional:       General: She is not in acute distress.     Appearance: She is obese.   HENT:      Head: Normocephalic and atraumatic.      Right Ear: External ear normal.      Left Ear: External ear normal.      Nose: Nose normal.      Mouth/Throat:      Mouth: Mucous membranes are dry.      Pharynx: Oropharynx is clear.   Eyes:      Extraocular Movements: Extraocular movements intact.      Conjunctiva/sclera: Conjunctivae normal.      Pupils: Pupils are equal, round, and reactive to light.   Cardiovascular:      Rate and Rhythm: Tachycardia present.   Pulmonary:      Effort: Pulmonary effort is normal.   Abdominal:      General: Abdomen is flat.      Palpations: Abdomen is soft.   Musculoskeletal:      Cervical back: Normal range of motion.   Skin:     General: Skin is warm and dry.      Capillary Refill: Capillary refill takes less than 2 seconds.   Neurological:      Mental Status: She is alert.      Comments:     E4 V5 M6  Drowsy but briskly awakens to voice.  Alert and oriented x3. Speech fluent, no aphasia or dysarthria. Following commands.    CN II-XII grossly intact, specifically:  PERRLA. EOMI  Visual fields intact   No facial asymmetry. Facial sensation intact in V1-V3.  Tongue midline.   Shoulder shrug symmetric    Motor:  PEARL & AG  Strength is weak in all extremities which patient attributes to pain  SILT            Today I personally reviewed pertinent medications, lines/drains/airways, imaging, cardiology results, laboratory results, microbiology results, notably:    CTH 1744 on 11/29/24  1.  Small right lateral convexity subdural hematoma measuring 3 mm in thickness.  Negative for midline shift.  2.  Negative for acute intracranial process otherwise.  Negative for other areas of hemorrhage, or skull fracture.  3.  Stable findings as noted above.

## 2024-12-01 NOTE — HPI
"54F PMHx HTN, HLD, DM2, hyperaldosteronism, morbid obesity s/p gastric sleeve, DAVIS, GERD, asthma, ALEN/OHS, HSV, migraine, fibromyalgia, ankylosing spondylitis, schizoaffective disorder bipolar type, anxiety, who presents as a transfer for 3mm R SDH. She initially presented to the [place] ED for evaluation of subjective seizures that started 2 days ago. OSH CTH revealed 3mm R SDH. She was transferred to [facility] for neurosurgical evaluation and directly admitted to [facility] for higher level of care.      Pt was resting comfortably in bed then began to have non-rhythmic, moderate to high intensity jerking movements of her head to the left once interview began and increased in frequency throughout interview. Per chart review, she presented to the ED on 11/17/24 with the same tic-like movements, which occur while she is alert and speaking and increase in intensity and frequency during examination. 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 She states that "light makes her seizures worse." Confirms 10/10 HA. She denies any n/v, LoC, new numbness, weakness, or gait instability. States that she took her celebrex yesterday. Pt denies any falls or head trauma in the last week.     "

## 2024-12-02 ENCOUNTER — NURSE TRIAGE (OUTPATIENT)
Dept: ADMINISTRATIVE | Facility: CLINIC | Age: 54
End: 2024-12-02
Payer: MEDICAID

## 2024-12-02 ENCOUNTER — TELEPHONE (OUTPATIENT)
Dept: CARDIOLOGY | Facility: CLINIC | Age: 54
End: 2024-12-02
Payer: MEDICAID

## 2024-12-02 ENCOUNTER — TELEPHONE (OUTPATIENT)
Dept: NEUROSURGERY | Facility: CLINIC | Age: 54
End: 2024-12-02
Payer: MEDICAID

## 2024-12-02 ENCOUNTER — TELEPHONE (OUTPATIENT)
Dept: OPHTHALMOLOGY | Facility: CLINIC | Age: 54
End: 2024-12-02
Payer: MEDICAID

## 2024-12-02 VITALS
HEIGHT: 56 IN | WEIGHT: 202 LBS | OXYGEN SATURATION: 92 % | HEART RATE: 98 BPM | DIASTOLIC BLOOD PRESSURE: 57 MMHG | RESPIRATION RATE: 18 BRPM | TEMPERATURE: 98 F | BODY MASS INDEX: 45.44 KG/M2 | SYSTOLIC BLOOD PRESSURE: 120 MMHG

## 2024-12-02 DIAGNOSIS — S06.5XAA SUBDURAL HEMATOMA: Primary | ICD-10-CM

## 2024-12-02 LAB
ALBUMIN SERPL BCP-MCNC: 3.1 G/DL (ref 3.5–5.2)
ALDOST SERPL-MCNC: 15.1 NG/DL
ALP SERPL-CCNC: 81 U/L (ref 40–150)
ALT SERPL W/O P-5'-P-CCNC: 17 U/L (ref 10–44)
ANION GAP SERPL CALC-SCNC: 6 MMOL/L (ref 8–16)
AST SERPL-CCNC: 13 U/L (ref 10–40)
BASOPHILS # BLD AUTO: 0 K/UL (ref 0–0.2)
BASOPHILS NFR BLD: 0 % (ref 0–1.9)
BILIRUB SERPL-MCNC: 0.2 MG/DL (ref 0.1–1)
BUN SERPL-MCNC: 11 MG/DL (ref 6–20)
CALCIUM SERPL-MCNC: 9.3 MG/DL (ref 8.7–10.5)
CHLORIDE SERPL-SCNC: 106 MMOL/L (ref 95–110)
CO2 SERPL-SCNC: 27 MMOL/L (ref 23–29)
CREAT SERPL-MCNC: 0.7 MG/DL (ref 0.5–1.4)
DIFFERENTIAL METHOD BLD: ABNORMAL
EOSINOPHIL # BLD AUTO: 0 K/UL (ref 0–0.5)
EOSINOPHIL NFR BLD: 0 % (ref 0–8)
ERYTHROCYTE [DISTWIDTH] IN BLOOD BY AUTOMATED COUNT: 16.6 % (ref 11.5–14.5)
EST. GFR  (NO RACE VARIABLE): >60 ML/MIN/1.73 M^2
GLUCOSE SERPL-MCNC: 101 MG/DL (ref 70–110)
HCT VFR BLD AUTO: 30.9 % (ref 37–48.5)
HGB BLD-MCNC: 8.9 G/DL (ref 12–16)
IMM GRANULOCYTES # BLD AUTO: 0.03 K/UL (ref 0–0.04)
IMM GRANULOCYTES NFR BLD AUTO: 0.4 % (ref 0–0.5)
LYMPHOCYTES # BLD AUTO: 2.9 K/UL (ref 1–4.8)
LYMPHOCYTES NFR BLD: 40.1 % (ref 18–48)
MAGNESIUM SERPL-MCNC: 2 MG/DL (ref 1.6–2.6)
MCH RBC QN AUTO: 22.7 PG (ref 27–31)
MCHC RBC AUTO-ENTMCNC: 28.8 G/DL (ref 32–36)
MCV RBC AUTO: 79 FL (ref 82–98)
MONOCYTES # BLD AUTO: 0.6 K/UL (ref 0.3–1)
MONOCYTES NFR BLD: 8.3 % (ref 4–15)
NEUTROPHILS # BLD AUTO: 3.7 K/UL (ref 1.8–7.7)
NEUTROPHILS NFR BLD: 51.2 % (ref 38–73)
NRBC BLD-RTO: 0 /100 WBC
PHOSPHATE SERPL-MCNC: 4.4 MG/DL (ref 2.7–4.5)
PLATELET # BLD AUTO: 373 K/UL (ref 150–450)
PMV BLD AUTO: 9.3 FL (ref 9.2–12.9)
POCT GLUCOSE: 106 MG/DL (ref 70–110)
POCT GLUCOSE: 127 MG/DL (ref 70–110)
POCT GLUCOSE: 176 MG/DL (ref 70–110)
POTASSIUM SERPL-SCNC: 4.5 MMOL/L (ref 3.5–5.1)
PROT SERPL-MCNC: 6.2 G/DL (ref 6–8.4)
RBC # BLD AUTO: 3.92 M/UL (ref 4–5.4)
SODIUM SERPL-SCNC: 139 MMOL/L (ref 136–145)
WBC # BLD AUTO: 7.21 K/UL (ref 3.9–12.7)

## 2024-12-02 PROCEDURE — 25000003 PHARM REV CODE 250

## 2024-12-02 PROCEDURE — 97530 THERAPEUTIC ACTIVITIES: CPT | Mod: CQ

## 2024-12-02 PROCEDURE — 84100 ASSAY OF PHOSPHORUS: CPT | Performed by: STUDENT IN AN ORGANIZED HEALTH CARE EDUCATION/TRAINING PROGRAM

## 2024-12-02 PROCEDURE — 99232 SBSQ HOSP IP/OBS MODERATE 35: CPT | Mod: ,,, | Performed by: PHYSICIAN ASSISTANT

## 2024-12-02 PROCEDURE — 83735 ASSAY OF MAGNESIUM: CPT | Performed by: STUDENT IN AN ORGANIZED HEALTH CARE EDUCATION/TRAINING PROGRAM

## 2024-12-02 PROCEDURE — 80053 COMPREHEN METABOLIC PANEL: CPT | Performed by: STUDENT IN AN ORGANIZED HEALTH CARE EDUCATION/TRAINING PROGRAM

## 2024-12-02 PROCEDURE — 97116 GAIT TRAINING THERAPY: CPT | Mod: CQ

## 2024-12-02 PROCEDURE — 36415 COLL VENOUS BLD VENIPUNCTURE: CPT | Performed by: STUDENT IN AN ORGANIZED HEALTH CARE EDUCATION/TRAINING PROGRAM

## 2024-12-02 PROCEDURE — 97535 SELF CARE MNGMENT TRAINING: CPT

## 2024-12-02 PROCEDURE — 85025 COMPLETE CBC W/AUTO DIFF WBC: CPT | Performed by: STUDENT IN AN ORGANIZED HEALTH CARE EDUCATION/TRAINING PROGRAM

## 2024-12-02 PROCEDURE — 63600175 PHARM REV CODE 636 W HCPCS

## 2024-12-02 PROCEDURE — 92523 SPEECH SOUND LANG COMPREHEN: CPT

## 2024-12-02 RX ORDER — POLYETHYLENE GLYCOL 3350 17 G/17G
17 POWDER, FOR SOLUTION ORAL 2 TIMES DAILY
Status: DISCONTINUED | OUTPATIENT
Start: 2024-12-02 | End: 2024-12-02 | Stop reason: HOSPADM

## 2024-12-02 RX ORDER — AMOXICILLIN 250 MG
1 CAPSULE ORAL 2 TIMES DAILY
Qty: 20 TABLET | Refills: 0 | Status: SHIPPED | OUTPATIENT
Start: 2024-12-02 | End: 2024-12-13

## 2024-12-02 RX ORDER — FLUTICASONE FUROATE AND VILANTEROL 100; 25 UG/1; UG/1
1 POWDER RESPIRATORY (INHALATION) DAILY
Status: DISCONTINUED | OUTPATIENT
Start: 2024-12-02 | End: 2024-12-02 | Stop reason: HOSPADM

## 2024-12-02 RX ORDER — BISACODYL 10 MG/1
10 SUPPOSITORY RECTAL DAILY PRN
Status: DISCONTINUED | OUTPATIENT
Start: 2024-12-02 | End: 2024-12-02 | Stop reason: HOSPADM

## 2024-12-02 RX ORDER — DEXTROMETHORPHAN HYDROBROMIDE, GUAIFENESIN 5; 100 MG/5ML; MG/5ML
650 LIQUID ORAL 3 TIMES DAILY PRN
Qty: 21 TABLET | Refills: 0 | Status: SHIPPED | OUTPATIENT
Start: 2024-12-02 | End: 2024-12-09

## 2024-12-02 RX ORDER — LEVETIRACETAM 500 MG/1
500 TABLET ORAL 2 TIMES DAILY
Qty: 10 TABLET | Refills: 0 | Status: SHIPPED | OUTPATIENT
Start: 2024-12-02 | End: 2024-12-07

## 2024-12-02 RX ADMIN — MUPIROCIN: 20 OINTMENT TOPICAL at 09:12

## 2024-12-02 RX ADMIN — INSULIN ASPART 10 UNITS: 100 INJECTION, SOLUTION INTRAVENOUS; SUBCUTANEOUS at 08:12

## 2024-12-02 RX ADMIN — ACETAMINOPHEN 650 MG: 325 TABLET ORAL at 12:12

## 2024-12-02 RX ADMIN — INSULIN ASPART 10 UNITS: 100 INJECTION, SOLUTION INTRAVENOUS; SUBCUTANEOUS at 12:12

## 2024-12-02 RX ADMIN — POLYETHYLENE GLYCOL 3350 17 G: 17 POWDER, FOR SOLUTION ORAL at 08:12

## 2024-12-02 RX ADMIN — INSULIN GLARGINE 25 UNITS: 100 INJECTION, SOLUTION SUBCUTANEOUS at 08:12

## 2024-12-02 RX ADMIN — LIDOCAINE 1 PATCH: 50 PATCH CUTANEOUS at 06:12

## 2024-12-02 RX ADMIN — DULOXETINE HYDROCHLORIDE 60 MG: 30 CAPSULE, DELAYED RELEASE ORAL at 08:12

## 2024-12-02 RX ADMIN — LAMOTRIGINE 200 MG: 100 TABLET ORAL at 08:12

## 2024-12-02 RX ADMIN — LEVETIRACETAM 500 MG: 100 INJECTION, SOLUTION INTRAVENOUS at 08:12

## 2024-12-02 RX ADMIN — SENNOSIDES AND DOCUSATE SODIUM 1 TABLET: 50; 8.6 TABLET ORAL at 08:12

## 2024-12-02 NOTE — HOSPITAL COURSE
54F PMHx HTN, HLD, DM2, hyperaldosteronism, morbid obesity s/p gastric sleeve, DAVIS, GERD, asthma, ALEN/OHS, HSV, migraine, fibromyalgia, ankylosing spondylitis, schizoaffective disorder bipolar type, anxiety, who presents as a transfer for 3mm R SDH. She initially presented to the [place] ED for evaluation of subjective seizures that started 2 days ago. OSH CTH revealed 3mm R SDH. She was transferred to [facility] for neurosurgical evaluation. Patient was admitted to [facility] for right subdural hematoma and subjective seizures. NSGY was consulted. Repeat CTH showed stable bleed without mass effect or midline shift. She had another subjective seizure and repeat EEG confirmed no epileptiform activity. [facility] says head movements are felt to be c/w PNEE. Plan to step down to  for PT/OT and psychiatric work up.

## 2024-12-02 NOTE — PLAN OF CARE
[facility] - Neurosurgery (Hospital)  Discharge Final Note    Primary Care Provider: Robe Britton MD    Expected Discharge Date: 12/2/2024    Final Discharge Note (most recent)       Final Note - 12/02/24 1622          Final Note    Assessment Type Final Discharge Note (P)      Anticipated Discharge Disposition Home or Self Care (P)      Hospital Resources/Appts/Education Provided Provided patient/caregiver with written discharge plan information (P)                    Important Message from Medicare    Contact Info       Robe Britton MD   Specialty: Family Medicine   Relationship: PCP - General    [address] [place] [id number]   Phone: [phone]       Next Steps: Follow up on 12/9/2024    Instructions: F/U appointment 7:30 am    You will be seeing Philippe Pardo          Future Appointments   Date Time Provider Department Center   12/3/2024  3:00 PM [facility] PhD[facility]   12/12/2024 11:20 AM [facility] MAMMO1-SCR [facility] MAMMO [facility]   12/13/2024  9:00 AM Kathy Sanders, PT [facility] RHBOPSV [facility]   12/16/2024  2:15 PM [facility] CT1 LIMIT 500 LBS [facility] CT SCAN [facility]   12/16/2024  3:20 PM Jaimie Wills MD [facility] CARDIO [facility]   12/17/2024 11:30 AM Yara Sanchez RD, CDE [facility] DIBEDU [facility]   12/26/2024  3:30 PM Deepthi Ho PA-C [facility] NEUROS8 [facility]   1/8/2025  4:30 PM Elizabeth Ceron NP [facility] DIABETE [facility]   4/17/2025  1:45 PM Chico Jackson MD [facility]     Discharge Plan A and Plan B have been determined by review of patient's clinical status, future medical and therapeutic needs, and coverage/benefits for post-acute care in coordination with multidisciplinary team members.    Pt was a transfer from [place] and exhausted all options for transportation. Pt with no ride back to [place]. Sw was notified pt will need clothes and a ride for dc.The unit manager Cecy helped sw locate scrubs and socks for the  pt. Sw booked a lyft for the pt home. Pt to dc home with lyft transportation.     Kathy Looney [facility]  Case Management  Ext. [phone]

## 2024-12-02 NOTE — NURSING
Pt requesting something for headache. Notified on call provider (Dr. Laguna)  concerning above; wanted to know if pt was allergic to only tylenol. Questioned pt concerning allergy to tylenol or allergy to acetaminophen when combined with opiod. Pt states that she can take tylenol without any problems. Made MD aware.

## 2024-12-02 NOTE — DISCHARGE INSTRUCTIONS
Please continue your psychiatry medications.  Keppra prescribed for 4 days to your pharmacy.    Do not take any blood thinners or NSAIDs   Neurosurgery will follow up in 2 weeks with outpatient CT head    Referrals placed to follow up outpatient with psychiatry, neurology, endocrinology, and physical therapy.   Post hospital follow up apt requested.     Please return to ED with any symptoms of sudden headache, visual changes, seizures, nausea/vomiting, confusion.

## 2024-12-02 NOTE — ASSESSMENT & PLAN NOTE
Yolanda Betts is a 54 y.o. female patient with a PMHx of anemia, anxiety, dipolar 1 disorder, GERD, asthma, type 2 DM, HTN, bipolar type schizoaffective disorder, dyslipidemia, morbid obesity, HSV, ALEN, HLD, myoclonus, and seizures, presents to hospital with self reported seizures, possibly pseudoseizures, consulted to NSGY for SDH.       CTH with very small, right focal SDH 3 mm in thickness  CTH repeat stable    EEG without epileptiform activity    - Stepped down to   - Neuro exam stable  - Will arrange 2 week follow up in NSGY clinic with repeat CTH    Discussed with Dr. Brewster

## 2024-12-02 NOTE — TELEPHONE ENCOUNTER
----- Message from Deepthi Ho PA-C sent at 12/2/2024 10:38 AM CST -----  Could you arrange 2 week follow up with CTH in [facility] please?    Thanks,  Deepthi

## 2024-12-02 NOTE — TELEPHONE ENCOUNTER
Spoke with pt in regards to rescheduling today's appointment due to her being in the hospital.                     ----- Message from [place] sent at 12/2/2024 12:02 PM CST -----  Contact: Yolanda  .Type:  Patient Returning Call    Who Called: Yolanda   Who Left Message for Patient: nurse   Does the patient know what this is regarding?: not sure possible an appt   Would the patient rather a call back or a response via [facility]?  Call back   Best Call Back Number: .[phone] (home)    Additional Information:      Thanks

## 2024-12-02 NOTE — SUBJECTIVE & OBJECTIVE
Interval History: NAEON. Neuro exam stable. Endorses slight headache improved with tylenol. EEG without seizure.     Medications:  Continuous Infusions:  Scheduled Meds:   atorvastatin  40 mg Oral QHS    DULoxetine  60 mg Oral BID    enoxparin  40 mg Subcutaneous Daily    fluticasone furoate-vilanteroL  1 puff Inhalation Daily    insulin aspart U-100  10 Units Subcutaneous TIDWM    insulin glargine U-100  25 Units Subcutaneous Daily    lamoTRIgine  200 mg Oral BID    levETIRAcetam (Keppra) IV (PEDS and ADULTS)  500 mg Intravenous Q12H    LIDOcaine  1 patch Transdermal Q24H    metoprolol succinate  50 mg Oral QAM    midazolam  2 mg Intravenous Once    mupirocin   Nasal BID    polyethylene glycol  17 g Oral BID    senna-docusate 8.6-50 mg  1 tablet Oral BID     PRN Meds:  Current Facility-Administered Medications:     albuterol, 2 puff, Inhalation, Q6H PRN    ALPRAZolam, 0.5 mg, Oral, BID PRN    bisacodyL, 10 mg, Rectal, Daily PRN    bisacodyL, 10 mg, Rectal, Daily PRN    famotidine, 20 mg, Oral, BID PRN    glucagon (human recombinant), 1 mg, Intramuscular, PRN    glucose, 16 g, Oral, PRN    glucose, 24 g, Oral, PRN    hydrALAZINE, 10 mg, Intravenous, Q6H PRN    insulin aspart U-100, 0-10 Units, Subcutaneous, QID (AC + HS) PRN    ondansetron, 4 mg, Intravenous, Q8H PRN    sodium chloride 0.9%, 10 mL, Intravenous, PRN     Review of Systems  Objective:     Weight: 91.6 kg (202 lb)  Body mass index is 45.31 kg/m².  Vital Signs (Most Recent):  Temp: 98.3 °F (36.8 °C) (12/02/24 0728)  Pulse: 80 (12/02/24 0728)  Resp: 18 (12/02/24 0728)  BP: 114/62 (12/02/24 0728)  SpO2: (!) 93 % (12/02/24 0728) Vital Signs (24h Range):  Temp:  [97.8 °F (36.6 °C)-98.3 °F (36.8 °C)] 98.3 °F (36.8 °C)  Pulse:  [77-99] 80  Resp:  [16-30] 18  SpO2:  [92 %-97 %] 93 %  BP: ()/(56-79) 114/62                                 Physical Exam         Neurosurgery Physical Exam  Constitutional: NAD     HEENT: atraumatic/normocephalic    "  Cardiovascular: Regular rhythm.      Pulm: aerating well, saturating well     Abdominal: Soft.      Psych/Behavior: He is alert.      E4V5M6  AOx3  PERRL  EOMI  Face Symmetric  FC FS x4          Significant Labs:  Recent Labs   Lab 12/01/24  0051 12/02/24  0347    101    139   K 4.3 4.5    106   CO2 25 27   BUN 10 11   CREATININE 0.7 0.7   CALCIUM 8.9 9.3   MG 2.0 2.0     Recent Labs   Lab 12/01/24  0552 12/02/24  0347   WBC 6.11 7.21   HGB 9.7* 8.9*   HCT 32.7* 30.9*    373     No results for input(s): "LABPT", "INR", "APTT" in the last 48 hours.  Microbiology Results (last 7 days)       ** No results found for the last 168 hours. **          All pertinent labs from the last 24 hours have been reviewed.    Significant Diagnostics:    "

## 2024-12-02 NOTE — PLAN OF CARE
Problem: Adult Inpatient Plan of Care  Goal: Plan of Care Review  Outcome: Progressing  Goal: Patient-Specific Goal (Individualized)  Outcome: Progressing  Goal: Absence of Hospital-Acquired Illness or Injury  Outcome: Progressing     Problem: Diabetes Comorbidity  Goal: Blood Glucose Level Within Targeted Range  Outcome: Progressing     Problem: Skin Injury Risk Increased  Goal: Skin Health and Integrity  Outcome: Progressing   Pt resting quietly. C/o headache earlier this shift with pain med given. Pt resting quietly; no seizure activity observed. Seizure precautions in place and call light in reach.

## 2024-12-02 NOTE — PT/OT/SLP PROGRESS
Physical Therapy Treatment    Patient Name:  Yolanda Betts   MRN:  [id number]    Recommendations:     Discharge Recommendations: Low Intensity Therapy  Discharge Equipment Recommendations: none  Barriers to discharge: Decreased caregiver support    Assessment:     Yolanda Betts is a 54 y.o. female admitted with a medical diagnosis of SDH (subdural hematoma).  She presents with the following impairments/functional limitations: weakness, impaired functional mobility, impaired balance, decreased coordination, pain, edema, impaired endurance.    Pt with improved tolerance for interventions this date as evidenced by completion of multiple gait trials, as well as improved level of independence with transfers. Pt performing GT trial both with and without use of RW with CGA/SBA; pt reporting improved stability during gait without use of AD. Pt will continue to benefit from skilled PT services in order to further promote functional mobility, endurance, and BLE strengthening. Pt remains appropriate for PT treatment at this time.    Rehab Prognosis: Good; patient would benefit from acute skilled PT services to address these deficits and reach maximum level of function.    Recent Surgery: * No surgery found *      Plan:     During this hospitalization, patient to be seen 4 x/week to address the identified rehab impairments via gait training, neuromuscular re-education, therapeutic activities, therapeutic exercises and progress toward the following goals:    Plan of Care Expires:  12/31/24    Subjective     Chief Complaint: none stated   Patient/Family Comments/goals: agreeable to therapy  Pain/Comfort:  Pain Rating 1: 0/10  Pain Rating Post-Intervention 1: 0/10      Objective:     Communicated with nursing prior to session.  Patient found HOB elevated with blood pressure cuff, pulse ox (continuous), telemetry, peripheral IV, bed alarm upon PT entry to room.     General Precautions: Standard, fall  Orthopedic  Precautions: N/A  Braces: N/A  Respiratory Status: Room air     Functional Mobility:  Bed Mobility:     Scooting: supervision  Supine to Sit: stand by assistance  Sit to Supine: stand by assistance  Transfers:     Sit to Stand:  stand by assistance and contact guard assistance with no AD and rolling walker  Bed to Chair: stand by assistance and contact guard assistance with  no AD  using  Step Transfer  Gait: 13 ft + 64 ft using no AD with CGA progressing to close SBA; seated rest break provided between trials. No LOB noted, mild postural sway present.  Gait: 81 ft using RW with SBA  Balance: static standing using RW with SBA progressing to SPV  Balance: static standing using no AD with close SBA      AM-PAC 6 CLICK MOBILITY  Turning over in bed (including adjusting bedclothes, sheets and blankets)?: 3  Sitting down on and standing up from a chair with arms (e.g., wheelchair, bedside commode, etc.): 3  Moving from lying on back to sitting on the side of the bed?: 3  Moving to and from a bed to a chair (including a wheelchair)?: 3  Need to walk in hospital room?: 3  Climbing 3-5 steps with a railing?: 2  Basic Mobility Total Score: 17       Treatment & Education:  Patient educated on calling for assistance for any needs to improve overall safety awareness.  Patient educated on role of PT in acute care, PT POC, and PT goals.  All items placed within reach, and notified on call light usage for assistance with any needs for fall prevention.  Patient educated on importance of OOB activity to promote overall endurance.     Patient left HOB elevated with all lines intact, call button in reach, and bed alarm on..    GOALS:   Multidisciplinary Problems       Physical Therapy Goals          Problem: Physical Therapy    Goal Priority Disciplines Outcome Interventions   Physical Therapy Goal     PT, PT/OT Progressing    Description: Goals to be met by: 12/15/24     Patient will increase functional independence with mobility by  performin. Supine to sit with Modified [place]  2. Sit to supine with Modified [place]  3. Sit to stand transfer with Supervision  4. Bed to chair transfer with Supervision using LRAD, if needed  5. Gait  x 150 feet with Supervision using LRAD, if needed.   6. Ascend/Descend 4 inch curb step with Contact Guard Assistance using LRAD, if needed.  7. Lower extremity exercise program x15 reps per handout, with assistance as needed                         Time Tracking:     PT Received On: 24  PT Start Time: 1132     PT Stop Time: 1156  PT Total Time (min): 24 min     Billable Minutes: Gait Training 15 and Therapeutic Activity 9    Treatment Type: Treatment  PT/PTA: PTA     Number of PTA visits since last PT visit: 2024

## 2024-12-02 NOTE — TELEPHONE ENCOUNTER
LVM for pt to call back in regards to rescheduling appt.                         ----- Message from Stacie sent at 12/2/2024  9:33 AM CST -----  Contact: PILO YANCEY [[id number]]  ..Type:  Patient Requesting Call    Who Called: PILO YANCEY [[id number]]  Does the patient know what this is regarding?: reschedule appt, pt admitted to hospital  Would the patient rather a call back or a response via [facility]?  call  Best Call Back Number: .[phone] (home)   Additional Information: EP Medicaid, no appts generated

## 2024-12-02 NOTE — TELEPHONE ENCOUNTER
Spoke with patient she is currently in the hospital had 2 seizures will call back once she has been released and cleared from all providers to reschedule her cat sally Osullivan     ----- Message from Stacie sent at 12/2/2024  9:32 AM CST -----  Contact: PILO YANCEY [[id number]]  ..Type:  Patient Requesting Call    Who Called: PILO YANCEY [[id number]]  Does the patient know what this is regarding?: reschedule appt, pt admitted to hospital  Would the patient rather a call back or a response via [facility]?  call  Best Call Back Number: .[phone] (home)   Additional Information:

## 2024-12-02 NOTE — PROGRESS NOTES
[facility] ([facility])  Neurosurgery  Progress Note    Subjective:     History of Present Illness: Yolanda Betts is a 54 y.o. female patient with a PMHx of anemia, anxiety, dipolar 1 disorder, GERD, asthma, type 2 DM, HTN, bipolar type schizoaffective disorder, dyslipidemia, morbid obesity, HSV, ALEN, HLD, myoclonus, and seizures, presents to hospital with self reported seizures, consulted to NSGY for SDH. Pt reports shaking episodes started 2 days ago. Her whole body and head shake. She does not know how long the episodes last. She does not lose consciousness during and she is able to participate in interview during the episode. She denies falling and hitting her head    Post-Op Info:  * No surgery found *       Interval History: NAEON. Neuro exam stable. Endorses slight headache improved with tylenol. EEG without seizure.     Medications:  Continuous Infusions:  Scheduled Meds:   atorvastatin  40 mg Oral QHS    DULoxetine  60 mg Oral BID    enoxparin  40 mg Subcutaneous Daily    fluticasone furoate-vilanteroL  1 puff Inhalation Daily    insulin aspart U-100  10 Units Subcutaneous TIDWM    insulin glargine U-100  25 Units Subcutaneous Daily    lamoTRIgine  200 mg Oral BID    levETIRAcetam (Keppra) IV (PEDS and ADULTS)  500 mg Intravenous Q12H    LIDOcaine  1 patch Transdermal Q24H    metoprolol succinate  50 mg Oral QAM    midazolam  2 mg Intravenous Once    mupirocin   Nasal BID    polyethylene glycol  17 g Oral BID    senna-docusate 8.6-50 mg  1 tablet Oral BID     PRN Meds:  Current Facility-Administered Medications:     albuterol, 2 puff, Inhalation, Q6H PRN    ALPRAZolam, 0.5 mg, Oral, BID PRN    bisacodyL, 10 mg, Rectal, Daily PRN    bisacodyL, 10 mg, Rectal, Daily PRN    famotidine, 20 mg, Oral, BID PRN    glucagon (human recombinant), 1 mg, Intramuscular, PRN    glucose, 16 g, Oral, PRN    glucose, 24 g, Oral, PRN    hydrALAZINE, 10 mg, Intravenous, Q6H PRN    insulin aspart U-100, 0-10  "Units, Subcutaneous, QID (AC + HS) PRN    ondansetron, 4 mg, Intravenous, Q8H PRN    sodium chloride 0.9%, 10 mL, Intravenous, PRN     Review of Systems  Objective:     Weight: 91.6 kg (202 lb)  Body mass index is 45.31 kg/m².  Vital Signs (Most Recent):  Temp: 98.3 °F (36.8 °C) (12/02/24 0728)  Pulse: 80 (12/02/24 0728)  Resp: 18 (12/02/24 0728)  BP: 114/62 (12/02/24 0728)  SpO2: (!) 93 % (12/02/24 0728) Vital Signs (24h Range):  Temp:  [97.8 °F (36.6 °C)-98.3 °F (36.8 °C)] 98.3 °F (36.8 °C)  Pulse:  [77-99] 80  Resp:  [16-30] 18  SpO2:  [92 %-97 %] 93 %  BP: ()/(56-79) 114/62                                 Physical Exam         Neurosurgery Physical Exam  Constitutional: NAD     HEENT: atraumatic/normocephalic     Cardiovascular: Regular rhythm.      Pulm: aerating well, saturating well     Abdominal: Soft.      Psych/Behavior: He is alert.      E4V5M6  AOx3  PERRL  EOMI  Face Symmetric  FC FS x4          Significant Labs:  Recent Labs   Lab 12/01/24  0051 12/02/24  0347    101    139   K 4.3 4.5    106   CO2 25 27   BUN 10 11   CREATININE 0.7 0.7   CALCIUM 8.9 9.3   MG 2.0 2.0     Recent Labs   Lab 12/01/24  0552 12/02/24  0347   WBC 6.11 7.21   HGB 9.7* 8.9*   HCT 32.7* 30.9*    373     No results for input(s): "LABPT", "INR", "APTT" in the last 48 hours.  Microbiology Results (last 7 days)       ** No results found for the last 168 hours. **          All pertinent labs from the last 24 hours have been reviewed.    Significant Diagnostics:    Assessment/Plan:     * SDH (subdural hematoma)  Yolanda Betts is a 54 y.o. female patient with a PMHx of anemia, anxiety, dipolar 1 disorder, GERD, asthma, type 2 DM, HTN, bipolar type schizoaffective disorder, dyslipidemia, morbid obesity, HSV, ALEN, HLD, myoclonus, and seizures, presents to hospital with self reported seizures, possibly pseudoseizures, consulted to NSGY for SDH.       CTH with very small, right focal SDH 3 mm in " thickness  CTH repeat stable    EEG without epileptiform activity    - Stepped down to   - Neuro exam stable  - Will arrange 2 week follow up in NSGY clinic with repeat CTH    NSGY will sign off. Please contact with any questions or concerns.     Discussed with VERONICA CliffordC  Neurosurgery  [facility] - Neurosurgery ([facility])

## 2024-12-02 NOTE — PT/OT/SLP EVAL
Speech Language Pathology Evaluation  Cognitive Communication    Patient Name:  Yolanda Betts   MRN:  [id number]  Admitting Diagnosis: SDH (subdural hematoma)    Recommendations:     Recommendations:                General Recommendations:  Speech/language therapy  Diet recommendations:  Regular, Thin   Aspiration Precautions: Standard aspiration precautions   General Precautions: Standard, fall  Communication strategies:  provide increased time to answer    Assessment:     Yolanda Betts is a 54 y.o. female with an SLP diagnosis of aphasia with possible motor speech element, will further assess.     History:     Past Medical History:   Diagnosis Date    Abnormal Pap smear of cervix     HPV genital warts    Anemia     Anxiety     Arthritis     Asthma 10/11/2016    Bipolar 1 disorder     Diabetes mellitus, type 2     Dyslipidemia associated with type 2 diabetes mellitus 05/13/2019    Dyspnea due to COVID-19 09/12/2024    General anesthetics causing adverse effect in therapeutic use     Genital warts     GERD (gastroesophageal reflux disease)     Herpes simplex virus (HSV) infection     Hyperlipidemia     Hypertension     Hypertension complicating diabetes 05/05/2019    Migraine with aura and without status migrainosus, not intractable 03/21/2016    Mild persistent asthma without complication 10/11/2016    Morbid obesity with body mass index (BMI) of 45.0 to 49.9 in adult 08/03/2017    Myoclonus     Obstructive sleep apnea     ALEN (obstructive sleep apnea)     2L per N/C q HS    Schizoaffective disorder, bipolar type 04/25/2019    Seasonal allergic rhinitis due to pollen 05/13/2019    Seizures     Type 2 diabetes mellitus with hyperglycemia, with long-term current use of insulin 12/21/2017    Type 2 diabetes mellitus with hyperglycemia, without long-term current use of insulin 12/21/2017       Past Surgical History:   Procedure Laterality Date    abcess removed right back      ANGIOGRAM, CORONARY, WITH LEFT  HEART CATHETERIZATION N/A 2024    Procedure: Angiogram, Coronary, with Left Heart Cath;  Surgeon: Jaimie Wills MD;  Location: [facility] CATH LAB;  Service: Cardiology;  Laterality: N/A;    BELT ABDOMINOPLASTY  1996    BREAST SURGERY Bilateral     Reduction    CARPAL TUNNEL RELEASE Bilateral 2023    Procedure: RELEASE, CARPAL TUNNEL;  Surgeon: Neville Roth MD;  Location: [facility] OR;  Service: Orthopedics;  Laterality: Bilateral;  bilateral carpal tunnel release     SECTION      X 2    COLONOSCOPY      COLONOSCOPY N/A 2018    Procedure: colonoscopy for iron deficiency anemia;  Surgeon: Frankie Sanchez MD;  Location: [facility];  Service: Endoscopy;  Laterality: N/A;    COLONOSCOPY N/A 2018    Procedure: COLONOSCOPY;  Surgeon: Frankie Sanchez MD;  Location: [facility];  Service: Endoscopy;  Laterality: N/A;    COLONOSCOPY N/A 3/10/2023    Procedure: COLONOSCOPY;  Surgeon: Lalita Montes De Oca MD;  Location: [facility];  Service: Endoscopy;  Laterality: N/A;    COLONOSCOPY N/A 2024    Procedure: COLONOSCOPY;  Surgeon: Tara Og MD;  Location: [facility];  Service: Endoscopy;  Laterality: N/A;    EPIDURAL STEROID INJECTION INTO CERVICAL SPINE N/A 2023    Procedure: C6/7 IL ROCAEL RN IV Sedation;  Surgeon: Otto Phillips MD;  Location: [facility] PAIN MGT;  Service: Pain Management;  Laterality: N/A;    EPIDURAL STEROID INJECTION INTO CERVICAL SPINE N/A 2024    Procedure: C5/6 IL ROCAEL;  Surgeon: Otto Phillips MD;  Location: [facility] PAIN MGT;  Service: Pain Management;  Laterality: N/A;    ESOPHAGOGASTRODUODENOSCOPY N/A 3/10/2023    Procedure: EGD (ESOPHAGOGASTRODUODENOSCOPY);  Surgeon: Lalita Montes De Oca MD;  Location: [facility];  Service: Endoscopy;  Laterality: N/A;    HYSTERECTOMY      w/ BSO; hypermenorrhea    INJECTION OF ANESTHETIC AGENT AROUND MEDIAL BRANCH NERVES INNERVATING CERVICAL FACET JOINT Bilateral 2023    Procedure: Bilateral C5-7 MBB  "(diagnostic);  Surgeon: Otto Phillips MD;  [facility];  Service: Pain Management;  Laterality: Bilateral;    MOUTH SURGERY  1996    OOPHORECTOMY      hyst/bso, hypermenorrhea    ROBOT-ASSISTED CHOLECYSTECTOMY USING DA DARI XI N/A 1/3/2020    Procedure: XI ROBOTIC CHOLECYSTECTOMY;  Surgeon: Damir Driscoll MD;  Location: [facility] OR;  Service: General;  Laterality: N/A;    TOTAL REDUCTION MAMMOPLASTY      TUBAL LIGATION         Social History: Patient lives with alone. Her daughter helps with cleaning, cooking & finances but otherwise IND. Pt does not drive.     Subjective   Awake & alert. Pt reports difficulty word finding in conversation. Pt reports she wears glasses but does not have them here.     Pain/Comfort:  Pain Rating 1: 0/10  Pain Rating Post-Intervention 2: 0/10    Respiratory Status: Room air    Objective:     Cognitive Status:    Arousal/Alertness Delayed responses throughout  Attention No obvious deficits observed at this time  Orientation Oriented x4  Memory Delayed-recalled 2/3 unrelated words following a 3 min filled delay, 3/3 given min A  and recall general information with min A  Problem Solving wfl- provided solutions to problem scenarios       Receptive Language:   Comprehension:      Questions Simple yes/no 100% acc  Complex yes/no required min cueing  Commands  One step 100% acc  two step basic commands required min A- hesitations   Conversation delayed responses     Expressive Language:  Verbal:    Naming Confrontation 100% acc, Divergent responsive -named 8 items in a concrete category in 1 min (16 is WNL), and Single word responsive naming 100% acc  Conversational speech - impaired; pt closes eyes when trying to formulate thoughts in conversation, mildly disfluent, slow rate with hesitations & pauses as pt in speaking in conversation       Motor Speech:    pt closes eyes when trying to formulate thoughts in conversation stating "I'm thinking", mildly disfluent, slow rate " with hesitations & pauses as pt in speaking is conversation    Voice:   WFL    Visual-Spatial:  Not yet assessed    Reading:   Not yet assessed      Written Expression:   Not yet assessed    Treatment: SLP educated pt on strategies for word finding & clear speech, pt verbalized understanding.     Goals:   Multidisciplinary Problems       SLP Goals          Problem: SLP    Goal Priority Disciplines Outcome   SLP Goal     SLP    Description: Speech Language Pathology Goals  Goals expected to be met by 12/9  1. Pt will follow 2 step commands with 90% acc IND'ly  2. Will complete word finding tasks with 90% acc given min A  3. Further assess motor speech   4. Assess reading, writing & visual spatial skills     Goals expected to be met by 12/7:  1. Pt will participate in cognitive-linguistic evaluation to further develop POC. MET                         Plan:   Patient to be seen:  4 x/week   Plan of Care expires:  12/29/24  Plan of Care reviewed with:  patient   SLP Follow-Up:  Yes       Discharge recommendations:  Therapy Intensity Recommendations at Discharge:  (tbd)     Time Tracking:     SLP Treatment Date:   12/02/24  Speech Start Time:  1003  Speech Stop Time:  1021     Speech Total Time (min):  18 min    Billable Minutes: Eval 10  and Self Care/Home Management Training 8    12/02/2024

## 2024-12-03 ENCOUNTER — PATIENT MESSAGE (OUTPATIENT)
Dept: PSYCHIATRY | Facility: CLINIC | Age: 54
End: 2024-12-03
Payer: MEDICAID

## 2024-12-03 ENCOUNTER — TELEPHONE (OUTPATIENT)
Dept: PRIMARY CARE CLINIC | Facility: CLINIC | Age: 54
End: 2024-12-03
Payer: MEDICAID

## 2024-12-03 ENCOUNTER — NURSE TRIAGE (OUTPATIENT)
Dept: ADMINISTRATIVE | Facility: CLINIC | Age: 54
End: 2024-12-03
Payer: MEDICAID

## 2024-12-03 ENCOUNTER — TELEPHONE (OUTPATIENT)
Dept: DIABETES | Facility: CLINIC | Age: 54
End: 2024-12-03
Payer: MEDICAID

## 2024-12-03 NOTE — TELEPHONE ENCOUNTER
Returned call to pt in regards to first available appointment. Pt appointment has been scheduled for 12/09/2024 at 9:00 am. Pt voiced understanding.     ----- Message from Jose Miguel sent at 12/3/2024  8:23 AM CST -----  Contact: Yolanda  Type:  Sooner Apoointment Request    Caller is requesting a sooner appointment.  Caller declined first available appointment listed below.  Caller will not accept being placed on the waitlist and is requesting a message be sent to doctor.  Name of Caller:Yolanda   When is the first available appointment? 12/9/24  Symptoms: hospital follow up  Would the patient rather a call back or a response via [facility]?  Call back   Best Call Back Number:[phone]  Additional Information:  I did put patient on wait list for any openings     Thanks   Am

## 2024-12-03 NOTE — DISCHARGE SUMMARY
"Alexander Marcos - Neurosurgery ([facility]  [facility] Medicine  Discharge Summary      Patient Name: Yolanda Betts  MRN: [id number]  LUIS: [id number]  Patient Class: IP- Inpatient  Admission Date: 11/30/2024  Hospital Length of Stay: 2 days  Discharge Date and Time: 12/2/2024  4:32 PM  Attending Physician: Maricarmen att. providers found   Discharging Provider: Mariya Plascencia MD  Primary Care Provider: Robe Britton MD  [facility] Medicine Team: [facility] [place] Mariya Plascencia MD  Primary Care Team: [facility] [place]    HPI:   54F PMHx HTN, HLD, DM2, hyperaldosteronism, morbid obesity s/p gastric sleeve, DAVIS, GERD, asthma, ALEN/OHS, HSV, migraine, fibromyalgia, ankylosing spondylitis, schizoaffective disorder bipolar type, anxiety, who presents as a transfer for 3mm R SDH. She initially presented to the [place] ED for evaluation of subjective seizures that started 2 days ago. OSH CTH revealed 3mm R SDH. [place] [facility] [place] and directly admitted to [facility] for higher level of care.      Pt was resting comfortably in bed then began to have non-rhythmic, moderate to high intensity jerking movements of her head to the left once interview began and increased in frequency throughout interview. Per chart review, she presented to the ED on 11/17/24 with the same tic-like movements, which occur while she is alert and speaking and increase in intensity and frequency during examination. 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 She states that "light makes her seizures worse." Confirms 10/10 HA. She denies any n/v, LoC, new numbness, weakness, or gait instability. States that she took her celebrex yesterday. Pt denies any falls or head trauma in the last week.       * No surgery found *      Hospital Course:   54F PMHx HTN, HLD, DM2, hyperaldosteronism, morbid obesity s/p gastric sleeve, DAVIS, GERD, asthma, ALEN/OHS, HSV, " migraine, fibromyalgia, ankylosing spondylitis, schizoaffective disorder bipolar type, anxiety, who presents as a transfer for 3mm R SDH. She initially presented to the [place] ED for evaluation of subjective seizures that started 2 days ago. OSH CTH revealed 3mm R SDH. She was transferred to [facility] for [place]surgical evaluation. Patient was admitted to [facility] for right subdural hematoma and subjective seizures. NSGY was consulted. Repeat CTH showed stable bleed without mass effect or midline shift. She had another subjective seizure and repeat EEG confirmed no epileptiform activity. 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 PT/OT recommended low intensity therapy. Patient would like outpatient PT/OT referral.   Patient stable and ready for discharge.   Referrals placed for neurology, psychiatry, endocrinology, and PT/OT.   Post hospital follow up apt requested.   Neurosurgery will follow outpatient to repeat CT head     Goals of Care Treatment Preferences:  Code Status: Full Code      SDOH Screening:  The patient was screened for food insecurity, housing instability, transportation needs, utility difficulties, and interpersonal safety. The social determinant(s) of health identified as a concern this admission are:  Housing instability  Food insecurity      Social Drivers of Health with Concerns     Food Insecurity: Food Insecurity Present (11/30/2024)   Housing Stability: High Risk (11/30/2024)        Consults:   Consults (From admission, onward)          Status Ordering Provider     Inpatient consult to Registered Dietitian/Nutritionist  Once        Provider:  (Not yet assigned)    Completed MICHAEL DUNCAN     Inpatient consult to Neurosurgery  Once        Provider:  (Not yet assigned)    Completed RENETTA INMAN     Inpatient consult to Physical Medicine Rehab  Once        Provider:  (Not yet assigned)    Completed RENETTA INMAN     Inpatient consult to Registered  Dietitian/Nutritionist  Once        Provider:  (Not yet assigned)    Completed RENETTA INMAN            No new Assessment & Plan notes have been filed under this hospital service since the last note was generated.  Service: Hospital Medicine    Final Active Diagnoses:    Diagnosis Date Noted POA    PRINCIPAL PROBLEM:  SDH (subdural hematoma) [S06.5XAA] 11/30/2024 Yes    Abnormal head movements [R25.0] 11/30/2024 Yes    Ankylosing spondylitis [M45.9] 01/18/2024 Yes    Irritable bowel syndrome with both constipation and diarrhea [K58.2] 02/18/2020 Yes     Chronic    BMI 40.0-44.9, adult [Z68.41] 12/30/2019 Not Applicable    Hyperaldosteronism [E26.9] 10/22/2019 Yes    Nocturnal hypoxemia due to obesity - WITHOUT ALEN [E66.9, G47.36]  Yes     Chronic    Dyslipidemia associated with type 2 diabetes mellitus [E11.69, E78.5] 05/13/2019 Yes     Chronic    Fibromyalgia [M79.7] 05/02/2019 Yes     Chronic    Schizoaffective disorder, bipolar type [F25.0] 04/25/2019 Yes     Chronic    Type 2 diabetes mellitus with hyperglycemia, with long-term current use of insulin [E11.65, Z79.4] 12/21/2017 Not Applicable     Chronic    Generalized anxiety disorder [F41.1] 08/03/2017 Yes     Chronic    Moderate persistent asthma without complication [J45.40] 10/11/2016 Yes    Essential hypertension [I10] 03/21/2016 Yes     Chronic    GERD (gastroesophageal reflux disease) [K21.9] 03/21/2016 Yes      Problems Resolved During this Admission:       Discharged Condition: stable    Disposition: Home or Self Care    Follow Up:   Follow-up Information       Robe Britton MD Follow up on 12/9/2024.    Specialty: Family Medicine  Why: F/U appointment 7:30 am    You will be seeing Philippe Pardo  Contact information:  [address] [facility] [address] [phone]                           Patient Instructions:      Ambulatory referral/consult to Endocrinology   Standing Status: Future   Referral Priority: Routine Referral Type:  Consultation   Requested Specialty: Endocrinology   Number of Visits Requested: 1     Ambulatory referral/consult to Psychiatry   Standing Status: Future   Referral Priority: Routine Referral Type: Psychiatric   Referral Reason: Specialty Services Required   Requested Specialty: Psychiatry   Number of Visits Requested: 1     Ambulatory referral/consult to Neurology   Standing Status: Future   Referral Priority: Routine Referral Type: Consultation   Referral Reason: Specialty Services Required   Requested Specialty: Neurology   Number of Visits Requested: 1     Ambulatory referral/consult to Physical/Occupational Therapy   Standing Status: Future   Referral Priority: Routine Referral Type: Physical Medicine   Referral Reason: Specialty Services Required   Number of Visits Requested: 1       Significant Diagnostic Studies: N/A    Pending Diagnostic Studies:       Procedure Component Value Units Date/Time    EKG, 12 - Lead [[id number]]     Order Status: Sent Lab Status: No result            Medications:  Reconciled Home Medications:      Medication List        START taking these medications      acetaminophen 650 MG Tbsr  Commonly known as: TYLENOL  Take 1 tablet (650 mg total) by mouth 3 (three) times daily as needed (pain).     levETIRAcetam 500 MG Tab  Commonly known as: KEPPRA  Take 1 tablet (500 mg total) by mouth 2 (two) times daily. for 5 days     senna-docusate 8.6-50 mg 8.6-50 mg per tablet  Commonly known as: PERICOLACE  Take 1 tablet by mouth 2 (two) times daily. for 10 days            CHANGE how you take these medications      VITAMIN D2 1,250 mcg (50,000 unit) capsule  Generic drug: ergocalciferol  Take 1 capsule by mouth Take once weekly. 1 capsule every 14 days  What changed: Another medication with the same name was removed. Continue taking this medication, and follow the directions you see here.            CONTINUE taking these medications      * albuterol 2.5 mg /3 mL (0.083 %) nebulizer  "solution  Commonly known as: PROVENTIL  Take 2.5 mg by nebulization every 4 (four) hours as needed. Times a day     * albuterol 90 mcg/actuation inhaler  Commonly known as: PROVENTIL/VENTOLIN HFA  Inhale 2 puffs into the lungs every 6 (six) hours as needed for Wheezing.     ALPRAZolam 1 MG tablet  Commonly known as: XANAX  Take 1 tablet (1 mg total) by mouth 2 (two) times daily as needed for Anxiety.     atorvastatin 20 MG tablet  Commonly known as: LIPITOR  Take 1 tablet (20 mg total) by mouth every evening     azelastine-fluticasone 137-50 mcg/spray Spry nassal spray  Commonly known as: DYMISTA  Take 1 puff by Nasal route in the morning and 1 puff before bedtime. Do all this for 30 days.     * BAQSIMI 3 mg/actuation Spry  Generic drug: glucagon  1 spray by Nasal route as needed (hypoglycemia). For emergency use. May repeat 1 time after 15 minutes     * BAQSIMI 3 mg/actuation Spry  Generic drug: glucagon  SPRAY 1 SPRAY IN NOSTRIL AS NEEDED FOR EMERGENCY. MAY REPEAT 1 TIME AFTER 15 MINUTES     BD INSULIN SYRINGE ULTRA-FINE 0.5 mL 30 gauge x 1/2" Syrg  Generic drug: insulin syringe-needle U-100     CAPLYTA 10.5 mg Cap  Generic drug: lumateperone  Take 1 capsule (10.5 mg total) by mouth once daily.     cevimeline 30 mg capsule  Commonly known as: EVOXAC  Take 1 capsule by mouth in the morning and 1 capsule at noon and 1 capsule before bedtime. Do all this for 30 days.     COMP-AIR NEBULIZER COMPRESSOR Mariela  Generic drug: nebulizer and compressor  use as directed     COSENTYX PEN (2 PENS) 150 mg/mL Pnij  Generic drug: secukinumab  Inject 300 mg into the skin every 14 (fourteen) days.     cyclobenzaprine 10 MG tablet  Commonly known as: FLEXERIL  Take 1 tablet (10 mg total) by mouth 3 (three) times daily as needed (Pain).     * DEXCOM G6 SENSOR Mariela  Generic drug: blood-glucose sensor  Use to check bg. Change every 10 days     * DEXCOM G6 SENSOR Mariela  Generic drug: blood-glucose sensor  change sensor every 10 days.   " "  * DEXCOM G6 TRANSMITTER Mariela  Generic drug: blood-glucose transmitter  1 each by Misc.(Non-Drug; Combo Route) route every 3 (three) months.     * DEXCOM G6 TRANSMITTER Mariela  Generic drug: blood-glucose transmitter  Use as directed change every 3 months     DEXCOM G7  Misc  Generic drug: blood-glucose meter,continuous  1 each by Misc.(Non-Drug; Combo Route) route once. for 1 dose     DULoxetine 60 MG capsule  Commonly known as: CYMBALTA  Take 1 capsule by mouth 2 times daily     estradioL 0.01 % (0.1 mg/gram) vaginal cream  Commonly known as: ESTRACE  Place 1 g vaginally twice a week.     fenofibrate 145 MG tablet  Commonly known as: TRICOR  Take 1 tablet by mouth in the morning.     fluticasone propionate 50 mcg/actuation nasal spray  Commonly known as: FLONASE  Take 1 spray by Each Nostril route in the morning.     furosemide 20 MG tablet  Commonly known as: LASIX  Take 1 tablet (20 mg total) by mouth once daily.     insulin syringe-needle U-100 0.3 mL 30 gauge x 5/16" Syrg  1 each by Misc.(Non-Drug; Combo Route) route Every 3 (three) days.     lamoTRIgine 200 MG tablet  Commonly known as: LAMICTAL  Take 1 tablet (200 mg total) by mouth 2 (two) times daily.     LANTUS SOLOSTAR U-100 INSULIN 100 unit/mL (3 mL) Inpn pen  Generic drug: insulin glargine U-100 (Lantus)  Inject up to 50 units daily in event of pump failure     levocetirizine 5 MG tablet  Commonly known as: XYZAL  Take 1 tablet (5 mg total) by mouth every evening.     LIDOcaine 5 %  Commonly known as: LIDODERM  Place 2 patches onto the skin every 12 (twelve) hours as needed (pain). Remove & Discard patch within 12 hours or as directed by MD     magic mouthwash diphen/antac/lidoc/nysta  Swish and spit 5 mLs every 4 (four) hours as needed.     magnesium hydroxide 400 mg/5 ml 400 mg/5 mL Susp  Commonly known as: MILK OF MAGNESIA  Take 5 mLs by mouth every 4 (four) hours as needed.     magnesium oxide 400 mg (241.3 mg magnesium) tablet  Commonly " "known as: MAG-OX  Take 1 tablet (400 mg total) by mouth once daily.     methylPREDNISolone 4 mg tablet  Commonly known as: MEDROL DOSEPACK  use as directed     metoprolol succinate 50 MG 24 hr tablet  Commonly known as: TOPROL-XL  Take 1 tablet (50 mg total) by mouth every morning.     * insulin aspart U-100 100 unit/mL injection  Commonly known as: NovoLOG  NovoLOG FlexPen     * NovoLOG U-100 Insulin aspart 100 unit/mL injection  Generic drug: insulin aspart U-100  To use via insulin pump. Up to 200 units every 3 days     * NovoLOG Flexpen U-100 Insulin 100 unit/mL (3 mL) Inpn pen  Generic drug: insulin aspart U-100  Inject up to 10 units under the skin per sliding scale 3 times a day before meals     omeprazole 40 MG capsule  Commonly known as: PRILOSEC  Take 1 capsule (40 mg total) by mouth once daily.     OMNIPOD 5 G6 PODS (GEN 5) Crtg  Generic drug: insulin pump cart,automated,BT  1 each by Misc.(Non-Drug; Combo Route) route every 48 hours.     * OZEMPIC 2 mg/dose (8 mg/3 mL) Pnij  Generic drug: semaglutide  Inject 2 mg into the skin every 7 days.     * OZEMPIC 2 mg/dose (8 mg/3 mL) Pnij  Generic drug: semaglutide  Inject 2 mg into the skin every 7 days.     * OZEMPIC 2 mg/dose (8 mg/3 mL) Pnij  Generic drug: semaglutide  Inject 2 mg under the skin every 7 days.     pen needle, diabetic 31 gauge x 3/16" Ndle  Commonly known as: BD ULTRA-FINE MINI PEN NEEDLE  Use 1 a day in event of pump failure     polyethylene glycol 17 gram/dose powder  Commonly known as: GLYCOLAX  Take 17 g by mouth 2 (two) times daily.     promethazine 25 MG tablet  Commonly known as: PHENERGAN  Take 25 mg by mouth every 6 (six) hours as needed.     spironolactone 25 MG tablet  Commonly known as: ALDACTONE  Take 1 tablet by mouth in the morning.     sulfaSALAzine 500 MG EC tablet  Commonly known as: AZULFIDINE  Take 1 tablet (500 mg total) by mouth 2 (two) times daily.     SYMBICORT 160-4.5 mcg/actuation Hfaa  Generic drug: " budesonide-formoterol 160-4.5 mcg  Inhale 2 puffs into the lungs in the morning and 2 puffs before bedtime. 2 puffs every 12 hours     triamcinolone acetonide 0.1% 0.1 % paste  Commonly known as: KENALOG  Apply coating 2 times daily     XIIDRA 5 % Dpet  Generic drug: lifitegrast  Place 1 drop into both eyes 2 (two) times daily.     zolpidem 5 MG Tab  Commonly known as: AMBIEN  Take 1 tablet (5 mg total) by mouth nightly as needed (anxiety).           * This list has 14 medication(s) that are the same as other medications prescribed for you. Read the directions carefully, and ask your doctor or other care provider to review them with you.                STOP taking these medications      amlodipine-valsartan  mg per tablet  Commonly known as: EXFORGE     azelastine 137 mcg (0.1 %) nasal spray  Commonly known as: ASTELIN     celecoxib 100 MG capsule  Commonly known as: CeleBREX     cloNIDine 0.1 MG tablet  Commonly known as: CATAPRES     diazePAM 5 MG tablet  Commonly known as: VALIUM     metroNIDAZOLE 500 MG tablet  Commonly known as: FLAGYL            ASK your doctor about these medications      fluconazole 150 MG Tab  Commonly known as: DIFLUCAN  Take 1 tablet (150 mg total) by mouth once daily. May repeat after 72 hours if symptoms persist for 3 doses  Ask about: Should I take this medication?              Indwelling Lines/Drains at time of discharge:   Lines/Drains/Airways       None                   Time spent on the discharge of patient: 45 minutes         Mariya Plascencia MD  Department of Hospital Medicine  [facility] Neurosurgery ([facility])

## 2024-12-03 NOTE — TELEPHONE ENCOUNTER
----- Message from Diabetic Educator Mohini sent at 12/2/2024 12:19 PM CST -----  Contact: Yolanda    ----- Message -----  From: London Hernandez  Sent: 12/2/2024  12:09 PM CST  To: #      .Type:  Needs Medical Advice    Who Called:  Yolanda     Would the patient rather a call back or a response via [facility]? Call back   Best Call Back Number:  [phone] (home)    Additional Information:  Pt is calling in regard to being hospitalization due to a seizure and wanted to know if a follow up appt is needed and would like to get a call back from Yara Sanchez     Thanks

## 2024-12-03 NOTE — TELEPHONE ENCOUNTER
Pt recently discharged from the hospital for neurological issues- subdural hematoma. Discharged yesterday. Has a HA that started at about 1pm. Rates current pain 6/10. Has not taken any pain relief meds. Pt was having HA's prior to hospitalization- migraines. Also has h/o seizures. No n/v. No new/worsening symptoms since she was discharged other than the return of her HA.     Dispo- call pcp within 24 hours. Pt advised and VU. Message routed to pcp staff. Urged pt to call back for any new/worsening symptoms.  Reason for Disposition   Condition / symptoms SAME (not improving)    Additional Information   Negative: Sounds like a life-threatening emergency to the triager   Negative: Patient sounds very sick or weak to the triager   Negative: Sounds like a serious complication to the triager   Negative: Condition / symptoms WORSE   Negative: [1] Caller has URGENT question AND [2] triager unable to answer question   Negative: [1] Discharged from hospital within this past week AND [2] taking Coumadin (warfarin) AND [3] no INR testing performed within 5 days of discharge    Protocols used: Post-Hospitalization Follow-up Call-A-

## 2024-12-03 NOTE — TELEPHONE ENCOUNTER
Spoke with pt. She reports doing well with diabetes plan and insulin pump. Confirmed upcoming appt with pt, and she agrees to contact clinic if sooner appt needed.

## 2024-12-03 NOTE — TELEPHONE ENCOUNTER
Patient called regarding requested  hospital follow up related to myoclonic seizures  patient is scheduled for 01/08/25  with DM and 12/09/24 with PCP nothing sooner  is available -            ---- Message from Jose Miguel sent at 12/3/2024  8:18 AM CST -----  Contact: Yolanda Guerrero would like a call back at [phone], in regards to needing to make an appointment due to having seizures related to diabetes.  Thanks   Am

## 2024-12-03 NOTE — TELEPHONE ENCOUNTER
I have this pt calling that the pharmacist didn't get this rx for Keppra. Im looking and seeing that it went to [facility] but I see there were several warnings for fill. Can you assist me with this. I sent this to hospitalist that prescribed the meds and he was able to call in rx to [facility] in [place] and daughter will go . Pt will reach out if any other needs or assistance.  Unable to route to PCP listed             Reason for Disposition   [1] Prescription not at pharmacy AND [2] was prescribed by PCP recently (Exception: Triager has access to EMR and prescription is recorded there. Go to Home Care and confirm for pharmacy.)    Additional Information   Negative: [1] Intentional drug overdose AND [2] suicidal thoughts or ideas   Negative: MORE THAN A DOUBLE DOSE of a prescription or over-the-counter (OTC) drug   Negative: [1] DOUBLE DOSE (an extra dose or lesser amount) of prescription drug AND [2] any symptoms (e.g., dizziness, nausea, pain, sleepiness)   Negative: [1] DOUBLE DOSE (an extra dose or lesser amount) of over-the-counter (OTC) drug AND [2] any symptoms (e.g., dizziness, nausea, pain, sleepiness)   Negative: Took another person's prescription drug   Negative: [1] DOUBLE DOSE (an extra dose or lesser amount) of prescription drug AND [2] NO symptoms  (Exception: A double dose of antibiotics.)   Negative: Diabetes drug error or overdose (e.g., took wrong type of insulin or took extra dose)    Protocols used: Medication Question Call-A-

## 2024-12-09 NOTE — PROGRESS NOTES
Subjective:       Patient ID: Yolanda Betts is a 54 y.o. female.    Chief Complaint: Seizures (From 9:30am to 4 pm on Sunday), Hypertension, Diabetes, Headache, and Memory Loss      History of Present Illness:   Yolanda Betts 54 y.o. female presents today for hospital follow up with seizures, memory loss, HTN and Diabetes. Patient reports abnormal weight gain that has been present she believes its from her swelling. Treatment options and alternatives were discussed with the patient. Patient provided opportunity to ask additional questions.  All questions were answered. Voices understanding and acceptance of this advice. Instructed to call back if any further questions or concerns.    History of Present Illness             Past Medical History:   Diagnosis Date    Abnormal Pap smear of cervix     HPV genital warts    Anemia     Anxiety     Arthritis     Asthma 10/11/2016    Bipolar 1 disorder     Diabetes mellitus, type 2     Dyslipidemia associated with type 2 diabetes mellitus 05/13/2019    Dyspnea due to COVID-19 09/12/2024    General anesthetics causing adverse effect in therapeutic use     Genital warts     GERD (gastroesophageal reflux disease)     Herpes simplex virus (HSV) infection     Hyperlipidemia     Hypertension     Hypertension complicating diabetes 05/05/2019    Migraine with aura and without status migrainosus, not intractable 03/21/2016    Mild persistent asthma without complication 10/11/2016    Morbid obesity with body mass index (BMI) of 45.0 to 49.9 in adult 08/03/2017    Myoclonus     Obstructive sleep apnea     ALEN (obstructive sleep apnea)     2L per N/C q HS    Schizoaffective disorder, bipolar type 04/25/2019    Seasonal allergic rhinitis due to pollen 05/13/2019    Seizures     Type 2 diabetes mellitus with hyperglycemia, with long-term current use of insulin 12/21/2017    Type 2 diabetes mellitus with hyperglycemia, without long-term current use of insulin 12/21/2017  "    Family History   Problem Relation Name Age of Onset    Diabetes Mother      Hyperlipidemia Mother      Hypertension Mother      Asthma Mother      COPD Mother      Glaucoma Mother      Thyroid disease Mother      Anesthesia problems Mother          "almost had a cardiac arrest" , blood clots    Hypertension Father      Hyperlipidemia Father      Cancer Father          Brain, lung, liver, kidney    No Known Problems Sister      Hypertension Brother      Alcohol abuse Brother      Heart disease Maternal Grandmother      Hyperlipidemia Maternal Grandmother      Hypertension Maternal Grandmother      Cataracts Maternal Grandmother      Diabetes Maternal Grandmother      Heart disease Maternal Grandfather      Hyperlipidemia Maternal Grandfather      Hypertension Maternal Grandfather      Glaucoma Maternal Grandfather      Cancer Maternal Grandfather      Cataracts Maternal Grandfather      Macular degeneration Maternal Grandfather      Diabetes Maternal Grandfather      Heart disease Paternal Grandmother      Hyperlipidemia Paternal Grandmother      Hypertension Paternal Grandmother      Cataracts Paternal Grandmother      Heart disease Paternal Grandfather      Hyperlipidemia Paternal Grandfather      Hypertension Paternal Grandfather      Cataracts Paternal Grandfather      Breast cancer Maternal Cousin      Breast cancer Maternal Cousin      Breast cancer Maternal Cousin      Breast cancer Maternal Cousin       Social History     Socioeconomic History    Marital status: Single   Tobacco Use    Smoking status: Never    Smokeless tobacco: Never   Substance and Sexual Activity    Alcohol use: Yes     Alcohol/week: 0.0 standard drinks of alcohol     Comment: socially  No alcohol 72h prior to sx    Drug use: No    Sexual activity: Yes     Partners: Male     Birth control/protection: Surgical     Comment: john   Social History Narrative    Long-term care nurse     Social Drivers of Health     Financial Resource Strain: " "Medium Risk (11/30/2024)    Overall Financial Resource Strain (CARDIA)     Difficulty of Paying Living Expenses: Somewhat hard   Food Insecurity: Food Insecurity Present (11/30/2024)    Hunger Vital Sign     Worried About Running Out of Food in the Last Year: Often true     Ran Out of Food in the Last Year: Often true   Transportation Needs: No Transportation Needs (11/30/2024)    TRANSPORTATION NEEDS     Transportation : No   Physical Activity: Inactive (10/15/2024)    Exercise Vital Sign     Days of Exercise per Week: 0 days     Minutes of Exercise per Session: 0 min   Stress: Stress Concern Present (11/30/2024)    [place] [employer] - Occupational Stress Questionnaire     Feeling of Stress : Very much   Housing Stability: High Risk (11/30/2024)    Housing Stability Vital Sign     Unable to Pay for Housing in the Last Year: Yes     Homeless in the Last Year: No     Outpatient Encounter Medications as of 11/27/2024   Medication Sig Dispense Refill    albuterol (PROVENTIL) 2.5 mg /3 mL (0.083 %) nebulizer solution Take 2.5 mg by nebulization every 4 (four) hours as needed. Times a day 75 mL 1    albuterol (PROVENTIL/VENTOLIN HFA) 90 mcg/actuation inhaler Inhale 2 puffs into the lungs every 6 (six) hours as needed for Wheezing. 8.5 g 1    ALPRAZolam (XANAX) 1 MG tablet Take 1 tablet (1 mg total) by mouth 2 (two) times daily as needed for Anxiety. 60 tablet 2    atorvastatin (LIPITOR) 20 MG tablet Take 1 tablet (20 mg total) by mouth every evening 90 tablet 3    BD INSULIN SYRINGE ULTRA-FINE 1/2 mL 30 gauge x 1/2" Syrg   0    blood-glucose sensor (DEXCOM G6 SENSOR) Mariela change sensor every 10 days. 3 each 11    blood-glucose sensor (DEXCOM G7 SENSOR) Mariela Use to check bg. Change every 10 days 3 each 11    blood-glucose transmitter (DEXCOM G6 TRANSMITTER) Mariela 1 each by Misc.(Non-Drug; Combo Route) route every 3 (three) months. 1 each 3    blood-glucose transmitter (DEXCOM G6 TRANSMITTER) Mariela Use " "as directed change every 3 months 1 each 3    cevimeline (EVOXAC) 30 mg capsule Take 1 capsule by mouth in the morning and 1 capsule at noon and 1 capsule before bedtime. Do all this for 30 days. 90 capsule 0    cyclobenzaprine (FLEXERIL) 10 MG tablet Take 1 tablet (10 mg total) by mouth 3 (three) times daily as needed (Pain). 90 tablet 11    DULoxetine (CYMBALTA) 60 MG capsule Take 1 capsule by mouth 2 times daily 180 capsule 3    estradioL (ESTRACE) 0.01 % (0.1 mg/gram) vaginal cream Place 1 g vaginally twice a week. 42.5 g 3    fenofibrate (TRICOR) 145 MG tablet Take 1 tablet by mouth in the morning. 30 tablet 5    fluticasone propionate (FLONASE) 50 mcg/actuation nasal spray Take 1 spray by Each Nostril route in the morning. 16 g 3    furosemide (LASIX) 20 MG tablet Take 1 tablet (20 mg total) by mouth once daily. 90 tablet 3    glucagon (BAQSIMI) 3 mg/actuation Spry 1 spray by Nasal route as needed (hypoglycemia). For emergency use. May repeat 1 time after 15 minutes 2 each 1    glucagon (BAQSIMI) 3 mg/actuation [place] SPRAY 1 SPRAY IN NOSTRIL AS NEEDED FOR EMERGENCY. MAY REPEAT 1 TIME AFTER 15 MINUTES 2 each 1    insulin aspart U-100 (NOVOLOG FLEXPEN U-100 INSULIN) 100 unit/mL (3 mL) InPn pen Inject up to 10 units under the skin per sliding scale 3 times a day before meals 15 mL 3    insulin aspart U-100 (NOVOLOG U-100 INSULIN ASPART) 100 unit/mL injection To use via insulin pump. Up to 200 units every 3 days 20 mL 5    insulin aspart U-100 (NOVOLOG) 100 unit/mL injection NovoLOG FlexPen      insulin glargine U-100, Lantus, (LANTUS SOLOSTAR U-100 INSULIN) 100 unit/mL (3 mL) InPn pen Inject up to 50 units daily in event of pump failure 15 mL 3    insulin pump cart,automated,BT (OMNIPOD 5 G6 PODS, GEN 5,) Crtg 1 each by Misc.(Non-Drug; Combo Route) route every 48 hours. 45 each 3    insulin syringe-needle U-100 0.3 mL 30 gauge x 5/16" Syrg 1 each by Misc.(Non-Drug; Combo Route) route Every 3 (three) days. 100 each " "2    lamoTRIgine (LAMICTAL) 200 MG tablet Take 1 tablet (200 mg total) by mouth 2 (two) times daily. 60 tablet 2    levocetirizine (XYZAL) 5 MG tablet Take 1 tablet (5 mg total) by mouth every evening. 30 tablet 11    LIDOcaine (LIDODERM) 5 % Place 2 patches onto the skin every 12 (twelve) hours as needed (pain). Remove & Discard patch within 12 hours or as directed by MD 60 patch 11    lifitegrast (XIIDRA) 5 % Dpet Place 1 drop into both eyes 2 (two) times daily. 60 each 12    lumateperone (CAPLYTA) 10.5 mg Cap Take 1 capsule (10.5 mg total) by mouth once daily. 30 capsule 1    magic mouthwash diphen/antac/lidoc/nysta Swish and spit 5 mLs every 4 (four) hours as needed. 118 mL 0    magnesium hydroxide 400 mg/5 ml (MILK OF MAGNESIA) 400 mg/5 mL Susp Take 5 mLs by mouth every 4 (four) hours as needed.      magnesium oxide (MAG-OX) 400 mg (241.3 mg magnesium) tablet Take 1 tablet (400 mg total) by mouth once daily. 90 tablet 3    methylPREDNISolone (MEDROL DOSEPACK) 4 mg tablet use as directed 21 tablet 1    metoprolol succinate (TOPROL-XL) 50 MG 24 hr tablet Take 1 tablet (50 mg total) by mouth every morning. 30 tablet 11    nebulizer and compressor (COMP-AIR NEBULIZER COMPRESSOR) Mariela use as directed 1 each 0    omeprazole (PRILOSEC) 40 MG capsule Take 1 capsule (40 mg total) by mouth once daily. 90 capsule 1    OZEMPIC 2 mg/dose (8 mg/3 mL) PnIj Inject 2 mg into the skin every 7 days.      pen needle, diabetic (BD ULTRA-FINE MINI PEN NEEDLE) 31 gauge x 3/16" Ndle Use 1 a day in event of pump failure 100 each 11    polyethylene glycol (GLYCOLAX) 17 gram/dose powder Take 17 g by mouth 2 (two) times daily. 1020 g 11    promethazine (PHENERGAN) 25 MG tablet Take 25 mg by mouth every 6 (six) hours as needed.      secukinumab (COSENTYX PEN, 2 PENS,) 150 mg/mL PnIj Inject 300 mg into the skin every 14 (fourteen) days. 4 mL 11    semaglutide (OZEMPIC) 2 mg/dose (8 mg/3 mL) PnIj Inject 2 mg into the skin every 7 days. 3 mL " 2    semaglutide (OZEMPIC) 2 mg/dose (8 mg/3 mL) PnIj Inject 2 mg under the skin every 7 days. 3 mL 2    spironolactone (ALDACTONE) 25 MG tablet Take 1 tablet by mouth in the morning. 90 tablet 1    sulfaSALAzine (AZULFIDINE) 500 MG EC tablet Take 1 tablet (500 mg total) by mouth 2 (two) times daily. 60 tablet 0    SYMBICORT 160-4.5 mcg/actuation HFAA Inhale 2 puffs into the lungs in the morning and 2 puffs before bedtime. 2 puffs every 12 hours 10.2 g 2    triamcinolone acetonide 0.1% (KENALOG) 0.1 % paste Apply coating 2 times daily 5 g 12    VITAMIN D2 1,250 mcg (50,000 unit) capsule Take 1 capsule by mouth Take once weekly. 1 capsule every 14 days 4 capsule 3    zolpidem (AMBIEN) 5 MG Tab Take 1 tablet (5 mg total) by mouth nightly as needed (anxiety). 30 tablet 2    [DISCONTINUED] amlodipine-valsartan (EXFORGE)  mg per tablet Take 1 tablet by mouth once daily. 90 tablet 1    [DISCONTINUED] azelastine (ASTELIN) 137 mcg (0.1 %) nasal spray 2 sprays (274 mcg total) by Nasal route 2 (two) times daily. 30 mL 0    [DISCONTINUED] azelastine-fluticasone (DYMISTA) 137-50 mcg/spray [place] nassal spray Take 1 puff by Nasal route in the morning and 1 puff before bedtime. Do all this for 30 days. 23 g 3    [DISCONTINUED] celecoxib (CELEBREX) 100 MG capsule Take 1 capsule (100 mg total) by mouth 2 (two) times daily. 60 capsule 2    [DISCONTINUED] cloNIDine (CATAPRES) 0.1 MG tablet Take 1 tablet (0.1 mg total) by mouth 2 (two) times daily. 60 tablet 11    [DISCONTINUED] ergocalciferol (VITAMIN D2) 50,000 unit Cap Take 1 capsule twice weekly for 3 weeks then weekly 12 capsule 3    blood-glucose meter,continuous (DEXCOM G7 ) Misc 1 each by Misc.(Non-Drug; Combo Route) route once. for 1 dose 1 each 0    fenofibrate (TRICOR) 145 MG tablet Take 1 tablet (145 mg total) by mouth once daily. 90 tablet 3    [] fluconazole (DIFLUCAN) 150 MG Tab Take 1 tablet (150 mg total) by mouth once daily. May repeat after 72  hours if symptoms persist for 3 doses 3 tablet 0    [DISCONTINUED] clonazePAM (KLONOPIN) 1 MG tablet Take 1 tablet by mouth daily 30 tablet 0    [DISCONTINUED] diazePAM (VALIUM) 5 MG tablet Take 1 tablet (5 mg total) by mouth once. 45 minutes to 1 hour prior to MRI for procedural anxiety for 1 dose 1 tablet 0    [DISCONTINUED] glucagon, human recombinant, (GLUCAGON EMERGENCY KIT, HUMAN,) 1 mg SolR Inject 1 mg into the muscle as needed. Emergency use 1 each 1    [DISCONTINUED] lumateperone (CAPLYTA) 21 mg Cap Take 1 capsule (21 mg total) by mouth once daily. 30 capsule 2    [DISCONTINUED] metroNIDAZOLE (FLAGYL) 500 MG tablet Take 1 tablet (500 mg total) by mouth every 12 (twelve) hours for 7 days 14 tablet 0    [DISCONTINUED] traMADoL (ULTRAM) 50 mg tablet Take 1 tablet (50 mg total) by mouth every 6 (six) hours as needed for Pain. (Patient not taking: Reported on 11/21/2024) 12 tablet 0    [DISCONTINUED] valsartan (DIOVAN) 320 MG tablet Take 1 tablet (320 mg total) by mouth once daily. 90 tablet 3    [DISCONTINUED] ondansetron injection 4 mg        No facility-administered encounter medications on file as of 11/27/2024.       Review of Systems   Constitutional:  Positive for fatigue. Negative for appetite change, chills and fever.   HENT:  Negative for ear pain, sinus pressure, sore throat and trouble swallowing.    Eyes:  Negative for visual disturbance.   Respiratory:  Negative for shortness of breath.    Cardiovascular:  Negative for chest pain.   Gastrointestinal:  Negative for abdominal pain, diarrhea, nausea and vomiting.   Endocrine: Negative for cold intolerance, polyphagia and polyuria.   Genitourinary:  Positive for vaginal discharge. Negative for decreased urine volume and dysuria.   Musculoskeletal:  Negative for back pain.   Skin:  Negative for rash.   Allergic/Immunologic: Negative for environmental allergies and food allergies.   Neurological:  Positive for seizures and headaches. Negative for  "dizziness, tremors, weakness and numbness.        Memory loss   Hematological:  Does not bruise/bleed easily.   Psychiatric/Behavioral:  Negative for confusion and hallucinations. The patient is not nervous/anxious and is not hyperactive.    All other systems reviewed and are negative.      Objective:      /72 (BP Location: Left arm, Patient Position: Sitting)   Pulse 93   Temp 99 °F (37.2 °C) (Oral)   Ht 4' 8" (1.422 m)   Wt 93 kg (205 lb)   SpO2 98%   BMI 45.96 kg/m²   Physical Exam      Physical Exam  Constitutional:       General: She is not in acute distress.     Appearance: Normal appearance. She is obese.   HENT:      Head: Normocephalic.      Right Ear: Tympanic membrane normal.      Left Ear: Tympanic membrane normal.      Mouth/Throat:      Mouth: Mucous membranes are moist.   Eyes:      General: No scleral icterus.  Cardiovascular:      Rate and Rhythm: Normal rate and regular rhythm.      Heart sounds: No murmur heard.  Pulmonary:      Effort: Pulmonary effort is normal.      Breath sounds: Normal breath sounds.   Abdominal:      General: Bowel sounds are normal.      Palpations: Abdomen is soft.      Tenderness: There is no abdominal tenderness. There is no right CVA tenderness or left CVA tenderness.   Musculoskeletal:         General: No swelling or tenderness.      Cervical back: Normal range of motion and neck supple.      Right lower leg: No edema.      Left lower leg: No edema.   Skin:     General: Skin is warm and dry.   Neurological:      General: No focal deficit present.      Mental Status: She is alert and oriented to person, place, and time.      GCS: GCS eye subscore is 4. GCS verbal subscore is 5. GCS motor subscore is 6.      Cranial Nerves: No cranial nerve deficit.      Sensory: Sensation is intact.      Motor: Motor function is intact.      Coordination: Coordination is intact.      Gait: Gait normal.   Psychiatric:         Mood and Affect: Mood normal.         Behavior: " Behavior normal.         Thought Content: Thought content normal.         Judgment: Judgment normal.               Results for orders placed or performed during the hospital encounter of 11/17/24   POCT glucose    Collection Time: 11/17/24 11:23 AM   Result Value Ref Range    POC Glucose 214 (A) 74 - 106 mg/dL   CBC auto differential    Collection Time: 11/17/24 12:11 PM   Result Value Ref Range    WBC 10.00 3.90 - 12.70 K/uL    RBC 4.04 4.00 - 5.40 M/uL    Hemoglobin 9.4 (L) 12.0 - 16.0 g/dL    Hematocrit 30.3 (L) 37.0 - 48.5 %    MCV 75 (L) 82 - 98 fL    MCH 23.3 (L) 27.0 - 31.0 pg    MCHC 31.2 (L) 32.0 - 36.0 g/dL    RDW 17.0 (H) 11.5 - 14.5 %    Platelets 423 150 - 450 K/uL    MPV 7.0 (L) 7.4 - 10.4 fL    Gran # (ANC) 6.7 1.8 - 7.7 K/uL    Lymph # 2.7 1.0 - 4.8 K/uL    Mono # 0.6 0.3 - 1.0 K/uL    Eos # 0.0 0.0 - 0.5 K/uL    Baso # 0.10 0.00 - 0.20 K/uL    nRBC 0 0 /100 WBC    Gran % 66.9 38.0 - 73.0 %    Lymph % 26.9 18.0 - 48.0 %    Mono % 5.6 4.0 - 15.0 %    Eosinophil % 0.1 0.0 - 8.0 %    Basophil % 0.5 0.0 - 1.9 %    Differential Method Automated    Comprehensive metabolic panel    Collection Time: 11/17/24 12:11 PM   Result Value Ref Range    Sodium 136 136 - 145 mmol/L    Potassium 4.8 3.5 - 5.1 mmol/L    Chloride 104 95 - 110 mmol/L    CO2 22 (L) 23 - 29 mmol/L    Glucose 142 (H) 74 - 106 mg/dL    BUN 14 7 - 17 mg/dL    Creatinine 0.61 (L) 0.70 - 1.20 mg/dL    Calcium 9.3 8.4 - 10.2 mg/dL    Total Protein 7.2 6.3 - 8.2 g/dL    Albumin 4.0 3.5 - 5.2 g/dL    Total Bilirubin 0.4 0.2 - 1.3 mg/dL    Alkaline Phosphatase 85 38 - 145 U/L    AST 32 14 - 36 U/L    ALT 26 10 - 44 U/L    Anion Gap 10 8 - 16 mmol/L    eGFR >60 >60 mL/min/1.73 m^2   TSH    Collection Time: 11/17/24 12:11 PM   Result Value Ref Range    TSH 2.05 0.46 - 4.68 mIU/L   Calcium, ionized    Collection Time: 11/17/24 12:11 PM   Result Value Ref Range    Ionized Calcium 5.4 4.7 - 5.5 mg/dL   EKG 12-lead    Collection Time: 11/17/24 12:15 PM    Result Value Ref Range    QRS Duration 80 ms    [facility] QTC Calculation 430 ms     *Note: Due to a large number of results and/or encounters for the requested time period, some results have not been displayed. A complete set of results can be found in Results Review.     Assessment:       1. Myoclonic seizure    2. Migraine with aura and without status migrainosus, not intractable    3. Hyperaldosteronism    4. Memory loss    5. Acute vaginitis    6. Breast cancer screening by mammogram    Assessment & Plan             Plan:   Myoclonic seizure    Migraine with aura and without status migrainosus, not intractable    Hyperaldosteronism  -     ALDOSTERONE; Future; Expected date: 11/27/2024    Memory loss    Acute vaginitis  -     Discontinue: metroNIDAZOLE (FLAGYL) 500 MG tablet; Take 1 tablet (500 mg total) by mouth every 12 (twelve) hours for 7 days  Dispense: 14 tablet; Refill: 0  -     fluconazole (DIFLUCAN) 150 MG Tab; Take 1 tablet (150 mg total) by mouth once daily. May repeat after 72 hours if symptoms persist for 3 doses  Dispense: 3 tablet; Refill: 0    Breast cancer screening by mammogram  -     Mammo Digital Screening Bilat; Future; Expected date: 11/27/2024      Today's encounter took a total time of 30 minutes, and that time included Preparing to see the patient (review records, tests), Obtaining and/or reviewing separately obtained historical data, Performing a medically appropriate examination and/or evaluation , Counseling & educating the patient/family/caregiver on treatment plan with chronic health conditions , ordering medications, tests, and/or procedures, Referring and communicating with other healthcare professionals , Documenting clinical information in the electronic or other health record, Independently interpreting results & communicating results to the patient/family/caregiver.           [facility]   [address]  [place] [address]  Office  [phone]  Fax [phone]   This note was generated with the assistance of ambient listening technology. Verbal consent was obtained by the patient and accompanying visitor(s) for the recording of patient appointment to facilitate this note. I attest to having reviewed and edited the generated note for accuracy, though some syntax or spelling errors may persist. Please contact the author of this note for any clarification.

## 2024-12-10 ENCOUNTER — TELEPHONE (OUTPATIENT)
Dept: PSYCHIATRY | Facility: CLINIC | Age: 54
End: 2024-12-10
Payer: MEDICAID

## 2024-12-10 NOTE — TELEPHONE ENCOUNTER
----- Message from Milagros sent at 12/10/2024  1:27 PM CST -----  Contact: pt  Type:  Request a callback    Name of Caller: julia  Best Call Back Number: [phone]  Additional Information:  pt is asking for a call back to speak about her meds

## 2024-12-11 ENCOUNTER — OFFICE VISIT (OUTPATIENT)
Dept: PSYCHIATRY | Facility: CLINIC | Age: 54
End: 2024-12-11
Payer: MEDICAID

## 2024-12-11 VITALS
DIASTOLIC BLOOD PRESSURE: 82 MMHG | SYSTOLIC BLOOD PRESSURE: 129 MMHG | HEART RATE: 92 BPM | BODY MASS INDEX: 44.86 KG/M2 | WEIGHT: 200 LBS

## 2024-12-11 DIAGNOSIS — F25.0 SCHIZOAFFECTIVE DISORDER, BIPOLAR TYPE: Primary | Chronic | ICD-10-CM

## 2024-12-11 DIAGNOSIS — F41.1 GENERALIZED ANXIETY DISORDER: Chronic | ICD-10-CM

## 2024-12-11 DIAGNOSIS — R45.89 ANXIETY ABOUT HEALTH: ICD-10-CM

## 2024-12-11 PROCEDURE — 3061F NEG MICROALBUMINURIA REV: CPT | Mod: CPTII,,, | Performed by: PSYCHOLOGIST

## 2024-12-11 PROCEDURE — 3066F NEPHROPATHY DOC TX: CPT | Mod: CPTII,,, | Performed by: PSYCHOLOGIST

## 2024-12-11 PROCEDURE — 90836 PSYTX W PT W E/M 45 MIN: CPT | Mod: HP,HB,S$PBB, | Performed by: PSYCHOLOGIST

## 2024-12-11 PROCEDURE — 3046F HEMOGLOBIN A1C LEVEL >9.0%: CPT | Mod: CPTII,,, | Performed by: PSYCHOLOGIST

## 2024-12-11 PROCEDURE — 99214 OFFICE O/P EST MOD 30 MIN: CPT | Mod: HP,HB,S$PBB, | Performed by: PSYCHOLOGIST

## 2024-12-11 PROCEDURE — 99999 PR PBB SHADOW E&M-EST. PATIENT-LVL II: CPT | Mod: PBBFAC,HB,, | Performed by: PSYCHOLOGIST

## 2024-12-11 PROCEDURE — 3079F DIAST BP 80-89 MM HG: CPT | Mod: CPTII,,, | Performed by: PSYCHOLOGIST

## 2024-12-11 PROCEDURE — 1111F DSCHRG MED/CURRENT MED MERGE: CPT | Mod: CPTII,,, | Performed by: PSYCHOLOGIST

## 2024-12-11 PROCEDURE — 4010F ACE/ARB THERAPY RXD/TAKEN: CPT | Mod: CPTII,,, | Performed by: PSYCHOLOGIST

## 2024-12-11 PROCEDURE — 3074F SYST BP LT 130 MM HG: CPT | Mod: CPTII,,, | Performed by: PSYCHOLOGIST

## 2024-12-11 PROCEDURE — 99212 OFFICE O/P EST SF 10 MIN: CPT | Mod: PBBFAC | Performed by: PSYCHOLOGIST

## 2024-12-11 PROCEDURE — 1159F MED LIST DOCD IN RCRD: CPT | Mod: CPTII,,, | Performed by: PSYCHOLOGIST

## 2024-12-11 PROCEDURE — 3008F BODY MASS INDEX DOCD: CPT | Mod: CPTII,,, | Performed by: PSYCHOLOGIST

## 2024-12-11 RX ORDER — ALPRAZOLAM 1 MG/1
1 TABLET ORAL 2 TIMES DAILY PRN
Qty: 60 TABLET | Refills: 2 | Status: SHIPPED | OUTPATIENT
Start: 2024-12-11 | End: 2025-03-11

## 2024-12-11 RX ORDER — LAMOTRIGINE 100 MG/1
100 TABLET ORAL EVERY MORNING
Qty: 30 TABLET | Refills: 2 | Status: SHIPPED | OUTPATIENT
Start: 2024-12-11 | End: 2025-03-11

## 2024-12-11 NOTE — PROGRESS NOTES
Outpatient Psychiatry Follow-Up Visit    12/11/2024      Chief Complaint:  Yolanda Betts is a 54 y.o. female who presents today for follow-up of mood and anxiety.       LAST VISIT: Yolanda attended her visit. She reported that she has had more anxiety--about her medical condition. She said that her diabetes II is now diabetes I--her blood sugar has been high. She said that she had Zhou's today because she was out. She has not seen her uncle recently--he has stage 1 lung cancer and is on chemo and radiation. He is in a nursing home--she can't chance getting sick. He had been with her, and she was having to do everything for him. He calls her for money--others reportedly sneak in cigarettes and alcohol for him. Her kids have visited her some--sometimes cook for her. Her daughters reportedly recently bought a house together.     Yolanda was nauseated during this visit--thought she would vomit at one point. She said that she had not eaten anything yet today (it was after 2).     We agreed to try a higher dose of her Caplyta--previously, we started at 42 mg and that was too sedating for her. We agreed to try 21 mg and will monitor. She also reported continuing memory difficulties.     Plan--continue Lamictal 200 mg bid; Xanax 1 mg bid; Ambien 5 mg hs; takes Cymbalta 60 mg bid from another provider; increase Caplyta 21 mg; [facility] referral    CURRENT PRESENTATION: AFTERCARE: Yolanda attended her visit. Since we last met, she called and stated that she still was not sleeping. We had previously decreased her Caplyta back to 10.5 mg--she had an episode of jerking/shaking and had gone to the ER.  She was in ICU for 4 days. She reported having a hematoma on her brain. Her physical medicines were changed, but not her psychiatric medicines. She is feeling better than she has in a long time.     November 29th she went to [place] with friends and was not feeling right. She fell a couple of weeks before that. She had gone to [facility]--when walking out knew she couldn't make it and went back in. She started shaking--was aware of what was happening but couldn't stop her body from shaking. She went to [facility] and they did a scan and let her go home. Five days later, she had another episode at home. It was after Thanksgiving and she was cooking. She could feel it coming on--she opened her front door, called her daughter and then 911. By then, she was having a seizure--EMT went and took her to the ER. She said that the EMT did not think that she was having a seizure and initially was resistant to taking her to the ER. [facility] did not have a neurosurgeon, so she was taken to the [facility] in case she needed surgery.  She recovered well.    Her nephew is living with her--he is 18 y/o. He had been going back and forth between her sister and nephew's dad's girlfriend (nephew's dad is in residential). The girlfriend reportedly told Yolanda that she did not want to raise kids. Her 14-y/o nephew is living with Yolanda's sister, and Yolanda got the 17-y/o. Each kid reportedly gets SSI but that goes to his godparents--they have not been in their care since sometime last year. Her 32-y/o niece recently cursed Yolanda. They have had conflict--and with her sister and brother.  We spent some time discussing appropriate boundaries.  Thania has set good boundaries and limits with her family and will likely need to continue to do so.    This is the best Yolanda has looked in a long time--coordinated clothing, red lipstick. She has been taking all of her medicines the same except Lamictal--she has been taking 100 mg mornings only.  She did not report any abnormal involuntary movements, and none were observed during this visit.  We are continuing her medicines as noted.     Plan--continue Caplyta 10.5 mg; Xanax 1 mg bid; Ambien 5 mg hs; Lamictal 100 mg mornings; takes Cymbalta 60 mg bid from another provider;      since October 2024.          Therapist: Feroz Stanley LCSW with  [facility]   Ph: [phone]  Fax: [phone]     Prior medicines: Prozac, Wellbutrin, Paxil, Lamictal, Lexapro, Celexa, Cymbalta, Remeron, Abilify, Seroquel (raised blood sugars), Risperdal, Latuda, Vraylar, Restoril, Ambien (groggy), Ambien CR, trazodone, doxepin, Xanax, clonazepam  ________________________________________________________________    PSYCHOTHERAPY      Site: [facility]  Time: 38 minutes  Participants: Met with patient    Therapeutic Intervention Type: behavior modifying psychotherapy, supportive psychotherapy  Why chosen therapy is appropriate versus another modality: patient responds to this modality, evidence based practice    Target symptoms: anxiety , mood disorder, medical  Primary focus: see above    Outcome monitoring methods: self-report, observation    Patient's response to intervention:  The patient's response to intervention is accepting.    Progress toward goals:  The patient's progress toward goals is good.        8/30/2024     9:55 AM 6/12/2024     9:45 AM 6/3/2024     8:12 AM   GAD7   1. Feeling nervous, anxious, or on edge? 3  3  3    2. Not being able to stop or control worrying? 3  3  3    3. Worrying too much about different things? 3  3  3    4. Trouble relaxing? 3  3  3    5. Being so restless that it is hard to sit still? 3  3  3    6. Becoming easily annoyed or irritable? 3  3  3    7. Feeling afraid as if something awful might happen? 3  3  3    8. If you checked off any problems, how difficult have these problems made it for you to do your work, take care of things at home, or get along with other people? 3      JOANN-7 Score 21 21 21       Patient-reported      0-4 = Minimal anxiety  5-9 = Mild anxiety  10-14 = Moderate anxiety  15-21 = Severe anxiety       Review of Systems   PSYCHIATRIC: Pertinant items are noted in the narrative.    Past Medical, Family and Social History: The patient's past medical, family and social history have  "been reviewed and updated as appropriate within the electronic medical record - see encounter notes.      Current Outpatient Medications:     albuterol (PROVENTIL) 2.5 mg /3 mL (0.083 %) nebulizer solution, Take 2.5 mg by nebulization every 4 (four) hours as needed. Times a day, Disp: 75 mL, Rfl: 1    albuterol (PROVENTIL/VENTOLIN HFA) 90 mcg/actuation inhaler, Inhale 2 puffs into the lungs every 6 (six) hours as needed for Wheezing., Disp: 8.5 g, Rfl: 1    ALPRAZolam (XANAX) 1 MG tablet, Take 1 tablet (1 mg total) by mouth 2 (two) times daily as needed for Anxiety., Disp: 60 tablet, Rfl: 2    atorvastatin (LIPITOR) 20 MG tablet, Take 1 tablet (20 mg total) by mouth every evening, Disp: 90 tablet, Rfl: 3    BD INSULIN SYRINGE ULTRA-FINE 1/2 mL 30 gauge x 1/2" Syrg, , Disp: , Rfl: 0    blood-glucose meter,continuous (DEXCOM G7 ) Misc, 1 each by Misc.(Non-Drug; Combo Route) route once. for 1 dose, Disp: 1 each, Rfl: 0    blood-glucose sensor (DEXCOM G6 SENSOR) Mariela, change sensor every 10 days., Disp: 3 each, Rfl: 11    blood-glucose sensor (DEXCOM G7 SENSOR) Mariela, Use to check bg. Change every 10 days, Disp: 3 each, Rfl: 11    blood-glucose transmitter (DEXCOM G6 TRANSMITTER) Mariela, 1 each by Misc.(Non-Drug; Combo Route) route every 3 (three) months., Disp: 1 each, Rfl: 3    blood-glucose transmitter (DEXCOM G6 TRANSMITTER) Mariela, Use as directed change every 3 months, Disp: 1 each, Rfl: 3    cevimeline (EVOXAC) 30 mg capsule, Take 1 capsule by mouth in the morning and 1 capsule at noon and 1 capsule before bedtime. Do all this for 30 days., Disp: 90 capsule, Rfl: 0    cyclobenzaprine (FLEXERIL) 10 MG tablet, Take 1 tablet (10 mg total) by mouth 3 (three) times daily as needed (Pain)., Disp: 90 tablet, Rfl: 11    DULoxetine (CYMBALTA) 60 MG capsule, Take 1 capsule by mouth 2 times daily, Disp: 180 capsule, Rfl: 3    estradioL (ESTRACE) 0.01 % (0.1 mg/gram) vaginal cream, Place 1 g vaginally twice a week., Disp: " "42.5 g, Rfl: 3    fenofibrate (TRICOR) 145 MG tablet, Take 1 tablet by mouth in the morning., Disp: 30 tablet, Rfl: 5    fluticasone propionate (FLONASE) 50 mcg/actuation nasal spray, Take 1 spray by Each Nostril route in the morning., Disp: 16 g, Rfl: 3    furosemide (LASIX) 20 MG tablet, Take 1 tablet (20 mg total) by mouth once daily., Disp: 90 tablet, Rfl: 3    glucagon (BAQSIMI) 3 mg/actuation Spry, 1 spray by Nasal route as needed (hypoglycemia). For emergency use. May repeat 1 time after 15 minutes, Disp: 2 each, Rfl: 1    glucagon (BAQSIMI) 3 mg/actuation Spry, SPRAY 1 SPRAY IN NOSTRIL AS NEEDED FOR EMERGENCY. MAY REPEAT 1 TIME AFTER 15 MINUTES, Disp: 2 each, Rfl: 1    insulin aspart U-100 (NOVOLOG FLEXPEN U-100 INSULIN) 100 unit/mL (3 mL) InPn pen, Inject up to 10 units under the skin per sliding scale 3 times a day before meals, Disp: 15 mL, Rfl: 3    insulin aspart U-100 (NOVOLOG U-100 INSULIN ASPART) 100 unit/mL injection, To use via insulin pump. Up to 200 units every 3 days, Disp: 20 mL, Rfl: 5    insulin aspart U-100 (NOVOLOG) 100 unit/mL injection, NovoLOG FlexPen, Disp: , Rfl:     insulin glargine U-100, Lantus, (LANTUS SOLOSTAR U-100 INSULIN) 100 unit/mL (3 mL) InPn pen, Inject up to 50 units daily in event of pump failure, Disp: 15 mL, Rfl: 3    insulin pump cart,automated,BT (OMNIPOD 5 G6 PODS, GEN 5,) Crtg, 1 each by Misc.(Non-Drug; Combo Route) route every 48 hours., Disp: 45 each, Rfl: 3    insulin syringe-needle U-100 0.3 mL 30 gauge x 5/16" Syrg, 1 each by Misc.(Non-Drug; Combo Route) route Every 3 (three) days., Disp: 100 each, Rfl: 2    lamoTRIgine (LAMICTAL) 100 MG tablet, Take 1 tablet (100 mg total) by mouth every morning., Disp: 30 tablet, Rfl: 2    levETIRAcetam (KEPPRA) 500 MG Tab, Take 1 tablet (500 mg total) by mouth 2 (two) times daily. for 5 days, Disp: 10 tablet, Rfl: 0    levocetirizine (XYZAL) 5 MG tablet, Take 1 tablet (5 mg total) by mouth every evening., Disp: 30 tablet, " "Rfl: 11    LIDOcaine (LIDODERM) 5 %, Place 2 patches onto the skin every 12 (twelve) hours as needed (pain). Remove & Discard patch within 12 hours or as directed by MD, Disp: 60 patch, Rfl: 11    lifitegrast (XIIDRA) 5 % Dpet, Place 1 drop into both eyes 2 (two) times daily., Disp: 60 each, Rfl: 12    lumateperone (CAPLYTA) 10.5 mg Cap, Take 1 capsule (10.5 mg total) by mouth once daily., Disp: 30 capsule, Rfl: 1    magic mouthwash diphen/antac/lidoc/nysta, Swish and spit 5 mLs every 4 (four) hours as needed., Disp: 118 mL, Rfl: 0    magnesium hydroxide 400 mg/5 ml (MILK OF MAGNESIA) 400 mg/5 mL Susp, Take 5 mLs by mouth every 4 (four) hours as needed., Disp: , Rfl:     magnesium oxide (MAG-OX) 400 mg (241.3 mg magnesium) tablet, Take 1 tablet (400 mg total) by mouth once daily., Disp: 90 tablet, Rfl: 3    methylPREDNISolone (MEDROL DOSEPACK) 4 mg tablet, use as directed, Disp: 21 tablet, Rfl: 1    metoprolol succinate (TOPROL-XL) 50 MG 24 hr tablet, Take 1 tablet (50 mg total) by mouth every morning., Disp: 30 tablet, Rfl: 11    nebulizer and compressor (COMP-AIR NEBULIZER COMPRESSOR) Mariela, use as directed, Disp: 1 each, Rfl: 0    omeprazole (PRILOSEC) 40 MG capsule, Take 1 capsule (40 mg total) by mouth once daily., Disp: 90 capsule, Rfl: 1    OZEMPIC 2 mg/dose (8 mg/3 mL) PnIj, Inject 2 mg into the skin every 7 days., Disp: , Rfl:     pen needle, diabetic (BD ULTRA-FINE MINI PEN NEEDLE) 31 gauge x 3/16" Ndle, Use 1 a day in event of pump failure, Disp: 100 each, Rfl: 11    polyethylene glycol (GLYCOLAX) 17 gram/dose powder, Take 17 g by mouth 2 (two) times daily., Disp: 1020 g, Rfl: 11    promethazine (PHENERGAN) 25 MG tablet, Take 25 mg by mouth every 6 (six) hours as needed., Disp: , Rfl:     secukinumab (COSENTYX PEN, 2 PENS,) 150 mg/mL PnIj, Inject 300 mg into the skin every 14 (fourteen) days., Disp: 4 mL, Rfl: 11    semaglutide (OZEMPIC) 2 mg/dose (8 mg/3 mL) PnIj, Inject 2 mg into the skin every 7 days., " Disp: 3 mL, Rfl: 2    semaglutide (OZEMPIC) 2 mg/dose (8 mg/3 mL) PnIj, Inject 2 mg under the skin every 7 days., Disp: 3 mL, Rfl: 2    senna-docusate 8.6-50 mg (PERICOLACE) 8.6-50 mg per tablet, Take 1 tablet by mouth 2 (two) times daily. for 10 days, Disp: 20 tablet, Rfl: 0    spironolactone (ALDACTONE) 25 MG tablet, Take 1 tablet by mouth in the morning., Disp: 90 tablet, Rfl: 1    sulfaSALAzine (AZULFIDINE) 500 MG EC tablet, Take 1 tablet (500 mg total) by mouth 2 (two) times daily., Disp: 60 tablet, Rfl: 0    SYMBICORT 160-4.5 mcg/actuation HFAA, Inhale 2 puffs into the lungs in the morning and 2 puffs before bedtime. 2 puffs every 12 hours, Disp: 10.2 g, Rfl: 2    triamcinolone acetonide 0.1% (KENALOG) 0.1 % paste, Apply coating 2 times daily, Disp: 5 g, Rfl: 12    VITAMIN D2 1,250 mcg (50,000 unit) capsule, Take 1 capsule by mouth Take once weekly. 1 capsule every 14 days, Disp: 4 capsule, Rfl: 3    zolpidem (AMBIEN) 5 MG Tab, Take 1 tablet (5 mg total) by mouth nightly as needed (anxiety)., Disp: 30 tablet, Rfl: 2    Compliance: yes    Side effects: see above    Risk Parameters:  Patient reports no suicidal ideation  Patient reports no homicidal ideation  Patient reports no self-injurious behavior  Patient reports no violent behavior    Exam (detailed: at least 9 elements; comprehensive: all 15 elements)   Constitutional    Wt Readings from Last 10 Encounters:   12/11/24 90.7 kg (200 lb)   11/30/24 91.6 kg (202 lb)   11/29/24 91.7 kg (202 lb 1.6 oz)   11/27/24 93 kg (205 lb)   11/21/24 95.2 kg (209 lb 14.1 oz)   10/28/24 92.7 kg (204 lb 5.9 oz)   10/17/24 93.7 kg (206 lb 9.1 oz)   10/17/24 92.9 kg (204 lb 12.9 oz)   10/16/24 93.4 kg (205 lb 14.6 oz)   10/12/24 92.5 kg (204 lb)     Vitals:  Most recent vital signs were reviewed.   Last 3 sets of Vitals        11/29/2024     4:39 PM 11/30/2024     2:30 AM 12/11/2024     2:37 PM   Vitals - 1 value per visit   SYSTOLIC  114 129   DIASTOLIC  58 82   Pulse  99 92  "  Temp  99.2 °F (37.3 °C)    Resp  21    SPO2  94 %    Weight (lb)  202.16 200   Weight (kg)  91.7 90.719   Height 4' 8" (1.422 m) 4' 8" (1.422 m)    BMI (Calculated)  45.3           General:  age appropriate, casually dressed, neatly groomed, wearing glasses, hair down, lipstick     Musculoskeletal  Muscle Strength/Tone:  no tremor, no tic, none observed   Gait & Station:  non-ataxic     Psychiatric  Speech:  no latency; no press, soft   Behavior: wnl   Mood & Affect:  euthymic  congruent and appropriate   Thought Process:  normal and logical   Associations:  intact   Thought Content:  normal, no suicidality, no homicidality, delusions, or paranoia, previously has reported hallucinations   Insight:  has awareness of illness   Judgement: behavior is adequate to circumstances   Orientation:  grossly intact   Memory: intact for content of interview   Language: grossly intact   Attention Span & Concentration:  Grossly intact   Fund of Knowledge:  intact and appropriate to age and level of education     Assessment and Diagnosis   Status/Progress: Based on the examination today, the patient's problem(s) is/are improved.  New problems have been presented today.   Co-morbidities and psychosocial stressors  are complicating management of the primary condition.  The working differential for this patient includes schizoaffective vs bipolar vs schizophrenia.     General Impression:     Encounter Diagnoses   Name Primary?    Schizoaffective disorder, bipolar type Yes    Generalized anxiety disorder     Anxiety about health          Intervention/Counseling/Treatment Plan   Medication Management: Discussed risks, benefits, and alternatives to treatment plan documented above with patient. I answered all patient questions related to this plan, and patient expressed understanding and agreement.   continue Caplyta 10.5 mg; Xanax 1 mg bid; Ambien 5 mg hs; Lamictal 100 mg mornings; takes Cymbalta 60 mg bid from another " "provider  Continue counseling    Exercise/movement  Limits/boundaries with family      Medication List with Changes/Refills   Current Medications    ALBUTEROL (PROVENTIL) 2.5 MG /3 ML (0.083 %) NEBULIZER SOLUTION    Take 2.5 mg by nebulization every 4 (four) hours as needed. Times a day    ALBUTEROL (PROVENTIL/VENTOLIN HFA) 90 MCG/ACTUATION INHALER    Inhale 2 puffs into the lungs every 6 (six) hours as needed for Wheezing.    ATORVASTATIN (LIPITOR) 20 MG TABLET    Take 1 tablet (20 mg total) by mouth every evening    BD INSULIN SYRINGE ULTRA-FINE 1/2 ML 30 GAUGE X 1/2" SYRG        BLOOD-GLUCOSE METER,CONTINUOUS (DEXCOM G7 ) MISC    1 each by Misc.(Non-Drug; Combo Route) route once. for 1 dose    BLOOD-GLUCOSE SENSOR (DEXCOM G6 SENSOR) NHI    change sensor every 10 days.    BLOOD-GLUCOSE SENSOR (DEXCOM G7 SENSOR) NHI    Use to check bg. Change every 10 days    BLOOD-GLUCOSE TRANSMITTER (DEXCOM G6 TRANSMITTER) NHI    1 each by Misc.(Non-Drug; Combo Route) route every 3 (three) months.    BLOOD-GLUCOSE TRANSMITTER (DEXCOM G6 TRANSMITTER) NHI    Use as directed change every 3 months    CEVIMELINE (EVOXAC) 30 MG CAPSULE    Take 1 capsule by mouth in the morning and 1 capsule at noon and 1 capsule before bedtime. Do all this for 30 days.    CYCLOBENZAPRINE (FLEXERIL) 10 MG TABLET    Take 1 tablet (10 mg total) by mouth 3 (three) times daily as needed (Pain).    DULOXETINE (CYMBALTA) 60 MG CAPSULE    Take 1 capsule by mouth 2 times daily    ESTRADIOL (ESTRACE) 0.01 % (0.1 MG/GRAM) VAGINAL CREAM    Place 1 g vaginally twice a week.    FENOFIBRATE (TRICOR) 145 MG TABLET    Take 1 tablet by mouth in the morning.    FLUTICASONE PROPIONATE (FLONASE) 50 MCG/ACTUATION NASAL SPRAY    Take 1 spray by Each Nostril route in the morning.    FUROSEMIDE (LASIX) 20 MG TABLET    Take 1 tablet (20 mg total) by mouth once daily.    GLUCAGON (BAQSIMI) 3 MG/ACTUATION SPRY    1 spray by Nasal route as needed (hypoglycemia). For " "emergency use. May repeat 1 time after 15 minutes    GLUCAGON (BAQSIMI) 3 MG/ACTUATION SPRY    SPRAY 1 SPRAY IN NOSTRIL AS NEEDED FOR EMERGENCY. MAY REPEAT 1 TIME AFTER 15 MINUTES    INSULIN ASPART U-100 (NOVOLOG FLEXPEN U-100 INSULIN) 100 UNIT/ML (3 ML) INPN PEN    Inject up to 10 units under the skin per sliding scale 3 times a day before meals    INSULIN ASPART U-100 (NOVOLOG U-100 INSULIN ASPART) 100 UNIT/ML INJECTION    To use via insulin pump. Up to 200 units every 3 days    INSULIN ASPART U-100 (NOVOLOG) 100 UNIT/ML INJECTION    NovoLOG FlexPen    INSULIN GLARGINE U-100, LANTUS, (LANTUS SOLOSTAR U-100 INSULIN) 100 UNIT/ML (3 ML) INPN PEN    Inject up to 50 units daily in event of pump failure    INSULIN PUMP CART,AUTOMATED,BT (OMNIPOD 5 G6 PODS, GEN 5,) CRTG    1 each by Misc.(Non-Drug; Combo Route) route every 48 hours.    INSULIN SYRINGE-NEEDLE U-100 0.3 ML 30 GAUGE X 5/16" SYRG    1 each by Misc.(Non-Drug; Combo Route) route Every 3 (three) days.    LEVETIRACETAM (KEPPRA) 500 MG TAB    Take 1 tablet (500 mg total) by mouth 2 (two) times daily. for 5 days    LEVOCETIRIZINE (XYZAL) 5 MG TABLET    Take 1 tablet (5 mg total) by mouth every evening.    LIDOCAINE (LIDODERM) 5 %    Place 2 patches onto the skin every 12 (twelve) hours as needed (pain). Remove & Discard patch within 12 hours or as directed by MD    LIFITEGRAST (XIIDRA) 5 % DPET    Place 1 drop into both eyes 2 (two) times daily.    LUMATEPERONE (CAPLYTA) 10.5 MG CAP    Take 1 capsule (10.5 mg total) by mouth once daily.    MAGIC MOUTHWASH DIPHEN/ANTAC/LIDOC/NYSTA    Swish and spit 5 mLs every 4 (four) hours as needed.    MAGNESIUM HYDROXIDE 400 MG/5 ML (MILK OF MAGNESIA) 400 MG/5 ML SUSP    Take 5 mLs by mouth every 4 (four) hours as needed.    MAGNESIUM OXIDE (MAG-OX) 400 MG (241.3 MG MAGNESIUM) TABLET    Take 1 tablet (400 mg total) by mouth once daily.    METHYLPREDNISOLONE (MEDROL DOSEPACK) 4 MG TABLET    use as directed    METOPROLOL " "SUCCINATE (TOPROL-XL) 50 MG 24 HR TABLET    Take 1 tablet (50 mg total) by mouth every morning.    NEBULIZER AND COMPRESSOR (COMP-AIR NEBULIZER COMPRESSOR) NHI    use as directed    OMEPRAZOLE (PRILOSEC) 40 MG CAPSULE    Take 1 capsule (40 mg total) by mouth once daily.    OZEMPIC 2 MG/DOSE (8 MG/3 ML) PNIJ    Inject 2 mg into the skin every 7 days.    PEN NEEDLE, DIABETIC (BD ULTRA-FINE MINI PEN NEEDLE) 31 GAUGE X 3/16" NDLE    Use 1 a day in event of pump failure    POLYETHYLENE GLYCOL (GLYCOLAX) 17 GRAM/DOSE POWDER    Take 17 g by mouth 2 (two) times daily.    PROMETHAZINE (PHENERGAN) 25 MG TABLET    Take 25 mg by mouth every 6 (six) hours as needed.    SECUKINUMAB (COSENTYX PEN, 2 PENS,) 150 MG/ML PNIJ    Inject 300 mg into the skin every 14 (fourteen) days.    SEMAGLUTIDE (OZEMPIC) 2 MG/DOSE (8 MG/3 ML) PNIJ    Inject 2 mg into the skin every 7 days.    SEMAGLUTIDE (OZEMPIC) 2 MG/DOSE (8 MG/3 ML) PNIJ    Inject 2 mg under the skin every 7 days.    SENNA-DOCUSATE 8.6-50 MG (PERICOLACE) 8.6-50 MG PER TABLET    Take 1 tablet by mouth 2 (two) times daily. for 10 days    SPIRONOLACTONE (ALDACTONE) 25 MG TABLET    Take 1 tablet by mouth in the morning.    SULFASALAZINE (AZULFIDINE) 500 MG EC TABLET    Take 1 tablet (500 mg total) by mouth 2 (two) times daily.    SYMBICORT 160-4.5 MCG/ACTUATION HFAA    Inhale 2 puffs into the lungs in the morning and 2 puffs before bedtime. 2 puffs every 12 hours    TRIAMCINOLONE ACETONIDE 0.1% (KENALOG) 0.1 % PASTE    Apply coating 2 times daily    VITAMIN D2 1,250 MCG (50,000 UNIT) CAPSULE    Take 1 capsule by mouth Take once weekly. 1 capsule every 14 days    ZOLPIDEM (AMBIEN) 5 MG TAB    Take 1 tablet (5 mg total) by mouth nightly as needed (anxiety).   Changed and/or Refilled Medications    Modified Medication Previous Medication    ALPRAZOLAM (XANAX) 1 MG TABLET ALPRAZolam (XANAX) 1 MG tablet       Take 1 tablet (1 mg total) by mouth 2 (two) times daily as needed for Anxiety.   "  Take 1 tablet (1 mg total) by mouth 2 (two) times daily as needed for Anxiety.    LAMOTRIGINE (LAMICTAL) 100 MG TABLET lamoTRIgine (LAMICTAL) 200 MG tablet       Take 1 tablet (100 mg total) by mouth every morning.    Take 1 tablet (200 mg total) by mouth 2 (two) times daily.        Return to Clinic: 6 weeks    I spent an additional 8 minutes performing E/M services with >50% spent on counseling, guidance, coordinating care (not Psychotherapy related) in addition to the 38 minutes performing Psychotherapy.    I spent a total of 46 minutes on the day of the visit. This includes face to face time and non-face to face time preparing to see the patient (eg, review of tests), obtaining and/or reviewing separately obtained history, documenting clinical information in the electronic or other health record, independently interpreting results and communicating results to the patient/family/caregiver, or care coordinator.        Mariam Young, PhD, MP  Advanced Practice Medical Psychologist  [facility]  [address]  [place] [id number]  [phone]   [phone] fax

## 2024-12-11 NOTE — PATIENT INSTRUCTIONS

## 2024-12-13 ENCOUNTER — TELEPHONE (OUTPATIENT)
Dept: PRIMARY CARE CLINIC | Facility: CLINIC | Age: 54
End: 2024-12-13
Payer: MEDICAID

## 2024-12-13 NOTE — TELEPHONE ENCOUNTER
I contacted Ms. Betts to inform her that Dr. Pitt has been out all of this week. I apologized for the inconvenience. Her follow up appointment was already scheduled for Tuesday 12/17/24 at 10:40 am with Dr. Sorenson. She continue to state that she wanted to be seen today. At that point I asked Radha to speak with her. Radha also informed her that there wasn't a provider here to speak with her today and for her to keep the appointment that was already scheduled. If she feels in any way that she is having an emergency situation, then she would need to report the ER or urgent care to be evaluated

## 2024-12-13 NOTE — TELEPHONE ENCOUNTER
----- Message from Sara sent at 12/13/2024 11:33 AM CST -----  Contact: Yolanda  Pt is calling in regards to being admitted on 11/29 to 12/08 seizure and brain bleed.pt is requesting a sooner appt.please call back at .[phone] she is requesting to see another  doctor not Dr. Sasha Sorenson        Thanks  [facility]

## 2024-12-15 ENCOUNTER — TELEPHONE (OUTPATIENT)
Dept: PRIMARY CARE CLINIC | Facility: CLINIC | Age: 54
End: 2024-12-15
Payer: MEDICAID

## 2024-12-16 ENCOUNTER — OFFICE VISIT (OUTPATIENT)
Dept: CARDIOLOGY | Facility: CLINIC | Age: 54
End: 2024-12-16
Payer: MEDICAID

## 2024-12-16 ENCOUNTER — HOSPITAL ENCOUNTER (OUTPATIENT)
Dept: RADIOLOGY | Facility: HOSPITAL | Age: 54
Discharge: HOME | End: 2024-12-16
Attending: PHYSICIAN ASSISTANT
Payer: MEDICAID

## 2024-12-16 VITALS
HEIGHT: 56 IN | BODY MASS INDEX: 44.87 KG/M2 | SYSTOLIC BLOOD PRESSURE: 136 MMHG | HEART RATE: 89 BPM | DIASTOLIC BLOOD PRESSURE: 92 MMHG | OXYGEN SATURATION: 99 %

## 2024-12-16 DIAGNOSIS — I10 HYPERTENSION COMPLICATING DIABETES: ICD-10-CM

## 2024-12-16 DIAGNOSIS — E78.2 MIXED HYPERLIPIDEMIA: ICD-10-CM

## 2024-12-16 DIAGNOSIS — Z98.890 HISTORY OF CORONARY ANGIOGRAM: ICD-10-CM

## 2024-12-16 DIAGNOSIS — E11.9 HYPERTENSION COMPLICATING DIABETES: ICD-10-CM

## 2024-12-16 DIAGNOSIS — E11.65 TYPE 2 DIABETES MELLITUS WITH HYPERGLYCEMIA, WITH LONG-TERM CURRENT USE OF INSULIN: ICD-10-CM

## 2024-12-16 DIAGNOSIS — I10 ESSENTIAL HYPERTENSION: Primary | ICD-10-CM

## 2024-12-16 DIAGNOSIS — F31.9 BIPOLAR 1 DISORDER: ICD-10-CM

## 2024-12-16 DIAGNOSIS — R00.2 PALPITATIONS: ICD-10-CM

## 2024-12-16 DIAGNOSIS — E66.2 OBESITY HYPOVENTILATION SYNDROME: ICD-10-CM

## 2024-12-16 DIAGNOSIS — Z79.4 TYPE 2 DIABETES MELLITUS WITH HYPERGLYCEMIA, WITH LONG-TERM CURRENT USE OF INSULIN: ICD-10-CM

## 2024-12-16 DIAGNOSIS — S06.5XAA SUBDURAL HEMATOMA: ICD-10-CM

## 2024-12-16 PROCEDURE — 4010F ACE/ARB THERAPY RXD/TAKEN: CPT | Mod: CPTII,,, | Performed by: INTERNAL MEDICINE

## 2024-12-16 PROCEDURE — 70450 CT HEAD/BRAIN W/O DYE: CPT | Mod: 26,,, | Performed by: RADIOLOGY

## 2024-12-16 PROCEDURE — 3061F NEG MICROALBUMINURIA REV: CPT | Mod: CPTII,,, | Performed by: INTERNAL MEDICINE

## 2024-12-16 PROCEDURE — 1111F DSCHRG MED/CURRENT MED MERGE: CPT | Mod: CPTII,,, | Performed by: INTERNAL MEDICINE

## 2024-12-16 PROCEDURE — 3066F NEPHROPATHY DOC TX: CPT | Mod: CPTII,,, | Performed by: INTERNAL MEDICINE

## 2024-12-16 PROCEDURE — 70450 CT HEAD/BRAIN W/O DYE: CPT | Mod: TC

## 2024-12-16 PROCEDURE — 99999 PR PBB SHADOW E&M-EST. PATIENT-LVL II: CPT | Mod: PBBFAC,,, | Performed by: INTERNAL MEDICINE

## 2024-12-16 PROCEDURE — 3046F HEMOGLOBIN A1C LEVEL >9.0%: CPT | Mod: CPTII,,, | Performed by: INTERNAL MEDICINE

## 2024-12-16 PROCEDURE — 3008F BODY MASS INDEX DOCD: CPT | Mod: CPTII,,, | Performed by: INTERNAL MEDICINE

## 2024-12-16 PROCEDURE — 99214 OFFICE O/P EST MOD 30 MIN: CPT | Mod: S$PBB,,, | Performed by: INTERNAL MEDICINE

## 2024-12-16 PROCEDURE — 3075F SYST BP GE 130 - 139MM HG: CPT | Mod: CPTII,,, | Performed by: INTERNAL MEDICINE

## 2024-12-16 PROCEDURE — 3080F DIAST BP >= 90 MM HG: CPT | Mod: CPTII,,, | Performed by: INTERNAL MEDICINE

## 2024-12-16 PROCEDURE — 99212 OFFICE O/P EST SF 10 MIN: CPT | Mod: PBBFAC,25 | Performed by: INTERNAL MEDICINE

## 2024-12-16 RX ORDER — AMLODIPINE BESYLATE 5 MG/1
5 TABLET ORAL DAILY
Qty: 30 TABLET | Refills: 11 | Status: SHIPPED | OUTPATIENT
Start: 2024-12-16 | End: 2025-12-16

## 2024-12-16 NOTE — TELEPHONE ENCOUNTER
LATE ENTRY:    Ivan Plata, BUBBA Supervisor, asked me to speak with patient on the telephone because she had been trying to explain to the patient that the requested provider was not in clinic, and that no appointment was available.  Upon answering the telephone at Ivan's desk, I asked the patient the two identifiers and patient stated name and .  Patient made me aware of the concerns of being discharged from the hospital the week prior and having an appointment canceled the day prior at 4:45 pm, per patient.  I apologized to the patient for the unforeseen and unexpected cancellation that was beyond the clinics control, but that the provider was out of clinic.  I reassured the patient that she was scheduled for the first available appt with the physician.  Patient wasn't satisfied and demanded an appt same day.  At that time, there was no provider in clinic, and patient was encouraged to seek an urgent treatment at Urgent Care or any emergent treatment at the ED.  Patient continued to decline saying that she doesn't need to seek treatment elsewhere to be told to follow up with the PCP. All options were explored to get patient to understand that no provider was at the clinic

## 2024-12-16 NOTE — PROGRESS NOTES
Subjective:   Patient ID:  Yolanda Betts is a 54 y.o. female who presents for evaluation of No chief complaint on file.      HPI  12.16.2024  54-year-old female with extensive history as below.    History of coronary angiogram nonobstructive.  She was diagnosed with seizures.  With also a subdural hematoma.  She was hospitalized a main [place].  No surgery was done.  She is on anti epilepsy medication now.  She says that they told her to stop the valsartan.  Appears that her pressure was a little on the lower side what was hospitalized.    No seizures since discharge.      Past Medical History:   Diagnosis Date    Abnormal Pap smear of cervix     HPV genital warts    Anemia     Anxiety     Arthritis     Asthma 10/11/2016    Bipolar 1 disorder     Diabetes mellitus, type 2     Dyslipidemia associated with type 2 diabetes mellitus 05/13/2019    Dyspnea due to COVID-19 09/12/2024    General anesthetics causing adverse effect in therapeutic use     Genital warts     GERD (gastroesophageal reflux disease)     Herpes simplex virus (HSV) infection     Hyperlipidemia     Hypertension     Hypertension complicating diabetes 05/05/2019    Migraine with aura and without status migrainosus, not intractable 03/21/2016    Mild persistent asthma without complication 10/11/2016    Morbid obesity with body mass index (BMI) of 45.0 to 49.9 in adult 08/03/2017    Myoclonus     Obstructive sleep apnea     ALEN (obstructive sleep apnea)     2L per N/C q HS    Schizoaffective disorder, bipolar type 04/25/2019    Seasonal allergic rhinitis due to pollen 05/13/2019    Seizures     Type 2 diabetes mellitus with hyperglycemia, with long-term current use of insulin 12/21/2017    Type 2 diabetes mellitus with hyperglycemia, without long-term current use of insulin 12/21/2017       Past Surgical History:   Procedure Laterality Date    abcess removed right back      ANGIOGRAM, CORONARY, WITH LEFT HEART CATHETERIZATION N/A 9/27/2024     Procedure: Angiogram, Coronary, with Left Heart Cath;  Surgeon: Jaimie Wills MD;  Location: [facility] CATH LAB;  Service: Cardiology;  Laterality: N/A;    BELT ABDOMINOPLASTY      BREAST SURGERY Bilateral 1996    Reduction    CARPAL TUNNEL RELEASE Bilateral 2023    Procedure: RELEASE, CARPAL TUNNEL;  Surgeon: Neville Roth MD;  Location: [facility] OR;  Service: Orthopedics;  Laterality: Bilateral;  bilateral carpal tunnel release     SECTION      X 2    COLONOSCOPY      COLONOSCOPY N/A 2018    Procedure: colonoscopy for iron deficiency anemia;  Surgeon: Frankie Sanchez MD;  Location: [facility];  Service: Endoscopy;  Laterality: N/A;    COLONOSCOPY N/A 2018    Procedure: COLONOSCOPY;  Surgeon: Frankie Sanchez MD;  Location: [facility];  Service: Endoscopy;  Laterality: N/A;    COLONOSCOPY N/A 3/10/2023    Procedure: COLONOSCOPY;  Surgeon: Lalita Montes De Oca MD;  Location: [facility];  Service: Endoscopy;  Laterality: N/A;    COLONOSCOPY N/A 2024    Procedure: COLONOSCOPY;  Surgeon: Tara Og MD;  Location: [facility];  Service: Endoscopy;  Laterality: N/A;    EPIDURAL STEROID INJECTION INTO CERVICAL SPINE N/A 2023    Procedure: C6/7 IL ROCAEL RN IV Sedation;  Surgeon: Otto Phillips MD;  Location: [facility] PAIN MGT;  Service: Pain Management;  Laterality: N/A;    EPIDURAL STEROID INJECTION INTO CERVICAL SPINE N/A 2024    Procedure: C5/6 IL ROCAEL;  Surgeon: Otto Phillips MD;  Location: [facility] PAIN MGT;  Service: Pain Management;  Laterality: N/A;    ESOPHAGOGASTRODUODENOSCOPY N/A 3/10/2023    Procedure: EGD (ESOPHAGOGASTRODUODENOSCOPY);  Surgeon: Lalita Montes De Oca MD;  Location: [facility];  Service: Endoscopy;  Laterality: N/A;    HYSTERECTOMY      w/ BSO; hypermenorrhea    INJECTION OF ANESTHETIC AGENT AROUND MEDIAL BRANCH NERVES INNERVATING CERVICAL FACET JOINT Bilateral 2023    Procedure: Bilateral C5-7 MBB (diagnostic);  Surgeon: Otto Phillips  "MD;  Location: [facility] PAIN MGT;  Service: Pain Management;  Laterality: Bilateral;    MOUTH SURGERY  1996    OOPHORECTOMY      hyst/bso, hypermenorrhea    ROBOT-ASSISTED CHOLECYSTECTOMY USING DA DARI XI N/A 1/3/2020    Procedure: XI ROBOTIC CHOLECYSTECTOMY;  Surgeon: Damir Driscoll MD;  Location: [facility] OR;  Service: General;  Laterality: N/A;    TOTAL REDUCTION MAMMOPLASTY      TUBAL LIGATION         Social History     Tobacco Use    Smoking status: Never    Smokeless tobacco: Never   Substance Use Topics    Alcohol use: Yes     Alcohol/week: 0.0 standard drinks of alcohol     Comment: socially  No alcohol 72h prior to sx    Drug use: No       Family History   Problem Relation Name Age of Onset    Diabetes Mother      Hyperlipidemia Mother      Hypertension Mother      Asthma Mother      COPD Mother      Glaucoma Mother      Thyroid disease Mother      Anesthesia problems Mother          "almost had a cardiac arrest" , blood clots    Hypertension Father      Hyperlipidemia Father      Cancer Father          Brain, lung, liver, kidney    No Known Problems Sister      Hypertension Brother      Alcohol abuse Brother      Heart disease Maternal Grandmother      Hyperlipidemia Maternal Grandmother      Hypertension Maternal Grandmother      Cataracts Maternal Grandmother      Diabetes Maternal Grandmother      Heart disease Maternal Grandfather      Hyperlipidemia Maternal Grandfather      Hypertension Maternal Grandfather      Glaucoma Maternal Grandfather      Cancer Maternal Grandfather      Cataracts Maternal Grandfather      Macular degeneration Maternal Grandfather      Diabetes Maternal Grandfather      Heart disease Paternal Grandmother      Hyperlipidemia Paternal Grandmother      Hypertension Paternal Grandmother      Cataracts Paternal Grandmother      Heart disease Paternal Grandfather      Hyperlipidemia Paternal Grandfather      Hypertension Paternal Grandfather      Cataracts Paternal Grandfather      " "Breast cancer Maternal Cousin      Breast cancer Maternal Cousin      Breast cancer Maternal Cousin      Breast cancer Maternal Cousin         Review of Systems   Cardiovascular:  Negative for chest pain, dyspnea on exertion, palpitations and syncope.   Genitourinary: Negative.    Neurological: Negative.        Current Outpatient Medications on File Prior to Visit   Medication Sig    albuterol (PROVENTIL) 2.5 mg /3 mL (0.083 %) nebulizer solution Take 2.5 mg by nebulization every 4 (four) hours as needed. Times a day    albuterol (PROVENTIL/VENTOLIN HFA) 90 mcg/actuation inhaler Inhale 2 puffs into the lungs every 6 (six) hours as needed for Wheezing.    ALPRAZolam (XANAX) 1 MG tablet Take 1 tablet (1 mg total) by mouth 2 (two) times daily as needed for Anxiety.    atorvastatin (LIPITOR) 20 MG tablet Take 1 tablet (20 mg total) by mouth every evening    BD INSULIN SYRINGE ULTRA-FINE 1/2 mL 30 gauge x 1/2" Syrg     blood-glucose sensor (DEXCOM G6 SENSOR) Mariela change sensor every 10 days.    blood-glucose sensor (DEXCOM G7 SENSOR) Mariela Use to check bg. Change every 10 days    blood-glucose transmitter (DEXCOM G6 TRANSMITTER) Mariela 1 each by Misc.(Non-Drug; Combo Route) route every 3 (three) months.    blood-glucose transmitter (DEXCOM G6 TRANSMITTER) Mariela Use as directed change every 3 months    cevimeline (EVOXAC) 30 mg capsule Take 1 capsule by mouth in the morning and 1 capsule at noon and 1 capsule before bedtime. Do all this for 30 days.    cyclobenzaprine (FLEXERIL) 10 MG tablet Take 1 tablet (10 mg total) by mouth 3 (three) times daily as needed (Pain).    DULoxetine (CYMBALTA) 60 MG capsule Take 1 capsule by mouth 2 times daily    estradioL (ESTRACE) 0.01 % (0.1 mg/gram) vaginal cream Place 1 g vaginally twice a week.    fenofibrate (TRICOR) 145 MG tablet Take 1 tablet by mouth in the morning.    fluticasone propionate (FLONASE) 50 mcg/actuation nasal spray Take 1 spray by Each Nostril route in the morning.    " "furosemide (LASIX) 20 MG tablet Take 1 tablet (20 mg total) by mouth once daily.    glucagon (BAQSIMI) 3 mg/actuation [place]ry 1 spray by Nasal route as needed (hypoglycemia). For emergency use. May repeat 1 time after 15 minutes    glucagon (BAQSIMI) 3 mg/actuation [place] SPRAY 1 SPRAY IN NOSTRIL AS NEEDED FOR EMERGENCY. MAY REPEAT 1 TIME AFTER 15 MINUTES    insulin aspart U-100 (NOVOLOG FLEXPEN U-100 INSULIN) 100 unit/mL (3 mL) InPn pen Inject up to 10 units under the skin per sliding scale 3 times a day before meals    insulin aspart U-100 (NOVOLOG U-100 INSULIN ASPART) 100 unit/mL injection To use via insulin pump. Up to 200 units every 3 days    insulin aspart U-100 (NOVOLOG) 100 unit/mL injection NovoLOG FlexPen    insulin glargine U-100, Lantus, (LANTUS SOLOSTAR U-100 INSULIN) 100 unit/mL (3 mL) InPn pen Inject up to 50 units daily in event of pump failure    insulin pump cart,automated,BT (OMNIPOD 5 G6 PODS, GEN 5,) Crtg 1 each by Misc.(Non-Drug; Combo Route) route every 48 hours.    insulin syringe-needle U-100 0.3 mL 30 gauge x 5/16" Syrg 1 each by Misc.(Non-Drug; Combo Route) route Every 3 (three) days.    lamoTRIgine (LAMICTAL) 100 MG tablet Take 1 tablet (100 mg total) by mouth every morning.    levocetirizine (XYZAL) 5 MG tablet Take 1 tablet (5 mg total) by mouth every evening.    LIDOcaine (LIDODERM) 5 % Place 2 patches onto the skin every 12 (twelve) hours as needed (pain). Remove & Discard patch within 12 hours or as directed by MD    lifitegrast (XIIDRA) 5 % Dpet Place 1 drop into both eyes 2 (two) times daily.    lumateperone (CAPLYTA) 10.5 mg Cap Take 1 capsule (10.5 mg total) by mouth once daily.    magic mouthwash diphen/antac/lidoc/nysta Swish and spit 5 mLs every 4 (four) hours as needed.    magnesium hydroxide 400 mg/5 ml (MILK OF MAGNESIA) 400 mg/5 mL Susp Take 5 mLs by mouth every 4 (four) hours as needed.    magnesium oxide (MAG-OX) 400 mg (241.3 mg magnesium) tablet Take 1 tablet (400 mg " "total) by mouth once daily.    methylPREDNISolone (MEDROL DOSEPACK) 4 mg tablet use as directed    metoprolol succinate (TOPROL-XL) 50 MG 24 hr tablet Take 1 tablet (50 mg total) by mouth every morning.    nebulizer and compressor (COMP-AIR NEBULIZER COMPRESSOR) Mariela use as directed    omeprazole (PRILOSEC) 40 MG capsule Take 1 capsule (40 mg total) by mouth once daily.    OZEMPIC 2 mg/dose (8 mg/3 mL) PnIj Inject 2 mg into the skin every 7 days.    pen needle, diabetic (BD ULTRA-FINE MINI PEN NEEDLE) 31 gauge x 3/16" Ndle Use 1 a day in event of pump failure    polyethylene glycol (GLYCOLAX) 17 gram/dose powder Take 17 g by mouth 2 (two) times daily.    promethazine (PHENERGAN) 25 MG tablet Take 25 mg by mouth every 6 (six) hours as needed.    secukinumab (COSENTYX PEN, 2 PENS,) 150 mg/mL PnIj Inject 300 mg into the skin every 14 (fourteen) days.    semaglutide (OZEMPIC) 2 mg/dose (8 mg/3 mL) PnIj Inject 2 mg into the skin every 7 days.    semaglutide (OZEMPIC) 2 mg/dose (8 mg/3 mL) PnIj Inject 2 mg under the skin every 7 days.    spironolactone (ALDACTONE) 25 MG tablet Take 1 tablet by mouth in the morning.    sulfaSALAzine (AZULFIDINE) 500 MG EC tablet Take 1 tablet (500 mg total) by mouth 2 (two) times daily.    SYMBICORT 160-4.5 mcg/actuation HFAA Inhale 2 puffs into the lungs in the morning and 2 puffs before bedtime. 2 puffs every 12 hours    triamcinolone acetonide 0.1% (KENALOG) 0.1 % paste Apply coating 2 times daily    VITAMIN D2 1,250 mcg (50,000 unit) capsule Take 1 capsule by mouth Take once weekly. 1 capsule every 14 days    zolpidem (AMBIEN) 5 MG Tab Take 1 tablet (5 mg total) by mouth nightly as needed (anxiety).    blood-glucose meter,continuous (DEXCOM G7 ) Misc 1 each by Misc.(Non-Drug; Combo Route) route once. for 1 dose    levETIRAcetam (KEPPRA) 500 MG Tab Take 1 tablet (500 mg total) by mouth 2 (two) times daily. for 5 days    [DISCONTINUED] clonazePAM (KLONOPIN) 1 MG tablet Take 1 " tablet by mouth daily    [DISCONTINUED] glucagon, human recombinant, (GLUCAGON EMERGENCY KIT, HUMAN,) 1 mg SolR Inject 1 mg into the muscle as needed. Emergency use    [DISCONTINUED] valsartan (DIOVAN) 320 MG tablet Take 1 tablet (320 mg total) by mouth once daily.     No current facility-administered medications on file prior to visit.       Objective:   Objective:  Wt Readings from Last 3 Encounters:   11/30/24 91.6 kg (202 lb)   11/29/24 91.7 kg (202 lb 1.6 oz)   11/27/24 93 kg (205 lb)     Temp Readings from Last 3 Encounters:   12/02/24 98.3 °F (36.8 °C) (Oral)   11/29/24 98.9 °F (37.2 °C) (Axillary)   11/27/24 99 °F (37.2 °C) (Oral)     BP Readings from Last 3 Encounters:   12/16/24 (!) 136/92   12/02/24 (!) 120/57   11/29/24 (!) 151/73     Pulse Readings from Last 3 Encounters:   12/16/24 89   12/02/24 98   11/29/24 95       Physical Exam  Vitals reviewed.   Constitutional:       Appearance: She is well-developed.   Neck:      Vascular: No carotid bruit.   Cardiovascular:      Rate and Rhythm: Normal rate and regular rhythm.      Pulses: Intact distal pulses.      Heart sounds: Normal heart sounds. No murmur heard.  Pulmonary:      Breath sounds: Normal breath sounds.   Neurological:      Mental Status: She is oriented to person, place, and time.         Lab Results   Component Value Date    CHOL 179 11/30/2024    CHOL 209 (H) 04/29/2024    CHOL 107 (L) 08/08/2023     Lab Results   Component Value Date    HDL 46 11/30/2024    HDL 65 04/29/2024    HDL 44 08/08/2023     Lab Results   Component Value Date    LDLCALC 91.6 11/30/2024    LDLCALC 123 (H) 04/29/2024    LDLCALC 43.4 (L) 08/08/2023     Lab Results   Component Value Date    TRIG 207 (H) 11/30/2024    TRIG 117 04/29/2024    TRIG 98 08/08/2023     Lab Results   Component Value Date    CHOLHDL 25.7 11/30/2024    CHOLHDL 41.1 08/08/2023    CHOLHDL 29.0 09/21/2022       Chemistry        Component Value Date/Time     12/02/2024 0347    K 4.5 12/02/2024  0347     12/02/2024 0347    CO2 27 12/02/2024 0347    BUN 11 12/02/2024 0347    CREATININE 0.7 12/02/2024 0347     12/02/2024 0347        Component Value Date/Time    CALCIUM 9.3 12/02/2024 0347    ALKPHOS 81 12/02/2024 0347    AST 13 12/02/2024 0347    ALT 17 12/02/2024 0347    BILITOT 0.2 12/02/2024 0347    ESTGFRAFRICA >60.0 03/08/2022 0735    EGFRNONAA >60.0 03/08/2022 0735          Lab Results   Component Value Date    TSH 1.984 11/30/2024     Lab Results   Component Value Date    INR 1.0 11/30/2024    INR 1.0 11/29/2024    INR 0.9 09/17/2024     Lab Results   Component Value Date    WBC 7.21 12/02/2024    HGB 8.9 (L) 12/02/2024    HCT 30.9 (L) 12/02/2024    MCV 79 (L) 12/02/2024     12/02/2024     BNP  @LABRCNTIP(BNP,BNPTRIAGEBLO)@  CrCl cannot be calculated (Patient's most recent lab result is older than the maximum 7 days allowed.).     Imaging:  ======    No results found for this or any previous visit.    No results found for this or any previous visit.    Results for orders placed during the hospital encounter of 09/04/24    X-Ray Chest PA And Lateral    Narrative  EXAM: XR CHEST PA AND LATERAL    CLINICAL HISTORY:  Asthma    TECHNIQUE: 2 views of the chest.    COMPARISON: 08/07/2024 x-ray    FINDINGS:  The heart size is normal. The lung fields are clear. No acute process is identified.    Impression  Negative two-view chest x-ray.    Finalized on: 9/4/2024 9:59 AM By:  Neville Preciado MD  BRRG# [id number]      2024-09-04 10:01:[id number]    BRRREGI    No results found for this or any previous visit.    No valid procedures specified.    Results for orders placed during the hospital encounter of 09/27/24    Cardiac catheterization    Conclusion    The left ventricular end diastolic pressure was elevated.    The pre-procedure left ventricular end diastolic pressure was 22.    The estimated blood loss was none.    The coronary arteries were normal..    There was diastolic dysfunction.    The  filling pressures on the left were mildly elevated.    The procedure log was documented by Documenter: Elizabeth Rojas RN and verified by Jaimie Wills MD.    Date: 9/27/2024  Time: 10:17 AM      Results for orders placed during the hospital encounter of 09/03/24    Nuclear Stress - Cardiology Interpreted    Interpretation Summary    Normal myocardial perfusion scan. There is no evidence of myocardial ischemia or infarction.    There is a mild intensity perfusion abnormality in the anterior wall of the left ventricle, secondary to breast attenuation.    The gated perfusion images showed an ejection fraction of 64% at rest. The gated perfusion images showed an ejection fraction of 70% post stress.    The ECG portion of the study is negative for ischemia.    The patient reported no chest pain during the stress test.    There were no arrhythmias during stress.    TID score 1.34      Results for orders placed during the hospital encounter of 11/30/24    Echo    Interpretation Summary    Left Ventricle: The left ventricle is normal in size. Ventricular mass is normal. Normal wall thickness. There is concentric remodeling. Normal wall motion. There is normal systolic function with a visually estimated ejection fraction of 60 - 65%. Ejection fraction is approximately 65%. There is normal diastolic function.    Right Ventricle: Normal right ventricular cavity size. Wall thickness is normal. Systolic function is normal.    IVC/SVC: Normal venous pressure at 3 mmHg.      Diagnostic Results:  ECG: Reviewed    The 10-year ASCVD risk score (Samuel CUEVAS, et al., 2019) is: 13%    Values used to calculate the score:      Age: 54 years      Sex: Female      Is Non- : Yes      Diabetic: Yes      Tobacco smoker: No      Systolic Blood Pressure: 136 mmHg      Is BP treated: Yes      HDL Cholesterol: 46 mg/dL      Total Cholesterol: 179 mg/dL        Assessment and Plan:   Essential hypertension  -      amLODIPine (NORVASC) 5 MG tablet; Take 1 tablet (5 mg total) by mouth once daily.  Dispense: 30 tablet; Refill: 11    Palpitations    Hypertension complicating diabetes    Mixed hyperlipidemia    History of coronary angiogram    Obesity hypoventilation syndrome    Bipolar 1 disorder    Type 2 diabetes mellitus with hyperglycemia, with long-term current use of insulin    Add amlodipine.  Follow with Neurology for recent seizures.  Blood pressure not at goal due to recent cessation of medication of valsartan after discharge from the hospital  Reviewed all tests and above medical conditions with patient in detail and formulated treatment plan.  Risk factor modification discussed.   Cardiac low salt diet discussed.  Maintaining healthy weight and weight loss goals were discussed in clinic.    Follow up in  6 months

## 2024-12-17 ENCOUNTER — PATIENT MESSAGE (OUTPATIENT)
Dept: PSYCHIATRY | Facility: CLINIC | Age: 54
End: 2024-12-17
Payer: MEDICAID

## 2024-12-17 ENCOUNTER — OFFICE VISIT (OUTPATIENT)
Dept: PRIMARY CARE CLINIC | Facility: CLINIC | Age: 54
End: 2024-12-17
Payer: MEDICAID

## 2024-12-17 ENCOUNTER — CLINICAL SUPPORT (OUTPATIENT)
Dept: DIABETES | Facility: CLINIC | Age: 54
End: 2024-12-17
Payer: MEDICAID

## 2024-12-17 VITALS — WEIGHT: 208.38 LBS | HEART RATE: 84 BPM | OXYGEN SATURATION: 98 % | BODY MASS INDEX: 46.76 KG/M2 | TEMPERATURE: 98 F

## 2024-12-17 DIAGNOSIS — M45.5 ANKYLOSING SPONDYLITIS OF THORACOLUMBAR REGION: ICD-10-CM

## 2024-12-17 DIAGNOSIS — F20.0 PARANOID SCHIZOPHRENIA: ICD-10-CM

## 2024-12-17 DIAGNOSIS — S06.5XAA SDH (SUBDURAL HEMATOMA): ICD-10-CM

## 2024-12-17 DIAGNOSIS — Z79.4 TYPE 2 DIABETES MELLITUS WITH HYPERGLYCEMIA, WITH LONG-TERM CURRENT USE OF INSULIN: Primary | Chronic | ICD-10-CM

## 2024-12-17 DIAGNOSIS — E55.9 VITAMIN D DEFICIENCY: ICD-10-CM

## 2024-12-17 DIAGNOSIS — E11.65 TYPE 2 DIABETES MELLITUS WITH HYPERGLYCEMIA, WITH LONG-TERM CURRENT USE OF INSULIN: Chronic | ICD-10-CM

## 2024-12-17 DIAGNOSIS — I10 ESSENTIAL HYPERTENSION: Chronic | ICD-10-CM

## 2024-12-17 DIAGNOSIS — Z79.4 TYPE 2 DIABETES MELLITUS WITH HYPERGLYCEMIA, WITH LONG-TERM CURRENT USE OF INSULIN: Chronic | ICD-10-CM

## 2024-12-17 DIAGNOSIS — E11.65 TYPE 2 DIABETES MELLITUS WITH HYPERGLYCEMIA, WITH LONG-TERM CURRENT USE OF INSULIN: Primary | Chronic | ICD-10-CM

## 2024-12-17 DIAGNOSIS — Z09 HOSPITAL DISCHARGE FOLLOW-UP: Primary | ICD-10-CM

## 2024-12-17 PROCEDURE — 3061F NEG MICROALBUMINURIA REV: CPT | Mod: CPTII,,, | Performed by: FAMILY MEDICINE

## 2024-12-17 PROCEDURE — 4010F ACE/ARB THERAPY RXD/TAKEN: CPT | Mod: CPTII,,, | Performed by: FAMILY MEDICINE

## 2024-12-17 PROCEDURE — 3008F BODY MASS INDEX DOCD: CPT | Mod: CPTII,,, | Performed by: FAMILY MEDICINE

## 2024-12-17 PROCEDURE — 99214 OFFICE O/P EST MOD 30 MIN: CPT | Mod: S$PBB,,, | Performed by: FAMILY MEDICINE

## 2024-12-17 PROCEDURE — 3066F NEPHROPATHY DOC TX: CPT | Mod: CPTII,,, | Performed by: FAMILY MEDICINE

## 2024-12-17 PROCEDURE — 1111F DSCHRG MED/CURRENT MED MERGE: CPT | Mod: CPTII,,, | Performed by: FAMILY MEDICINE

## 2024-12-17 PROCEDURE — G0108 DIAB MANAGE TRN  PER INDIV: HCPCS | Mod: 95,,, | Performed by: DIETITIAN, REGISTERED

## 2024-12-17 PROCEDURE — 99215 OFFICE O/P EST HI 40 MIN: CPT | Mod: PBBFAC,PN | Performed by: FAMILY MEDICINE

## 2024-12-17 PROCEDURE — 3046F HEMOGLOBIN A1C LEVEL >9.0%: CPT | Mod: CPTII,,, | Performed by: FAMILY MEDICINE

## 2024-12-17 PROCEDURE — 99999 PR PBB SHADOW E&M-EST. PATIENT-LVL V: CPT | Mod: PBBFAC,,, | Performed by: FAMILY MEDICINE

## 2024-12-17 RX ORDER — CHOLECALCIFEROL (VITAMIN D3) 1250 MCG
1250 TABLET ORAL
Qty: 12 TABLET | Refills: 3 | Status: SHIPPED | OUTPATIENT
Start: 2024-12-17

## 2024-12-17 NOTE — PROGRESS NOTES
Diabetes Care Specialist Virtual Visit Note     The patient location is: home in [place]  The chief complaint leading to consultation is: Diabetes  Visit type: audiovisual  Total time spent with patient: 30 min   Each patient to whom he or she provides medical services by telemedicine is:  (1) informed of the relationship between the physician and patient and the respective role of any other health care provider with respect to management of the patient; and (2) notified that he or she may decline to receive medical services by telemedicine and may withdraw from such care at any time.    Diabetes Care Specialist Progress Note  Author: Yara Sanchez RD, CDE  Date: 12/17/2024    Intake    Program Intake  Reason for Diabetes Program Visit:: Intervention (DM referral 2/5/24 Elizabeth Ceron, IRASEMA)  Type of Intervention:: Individual  Education: Advanced Pump, Nutrition and Meal Planning, Self-Management Skill Review (OmniPod5 (training 2/26/24))  Current diabetes risk level:: high  In the last month, have you used the ER or been admitted to the hospital: Yes  Was the ER or hospital admission related to diabetes?: No    Current Diabetes Treatment:  (Novolog via OP5 and Dex G6 (training 2/26/24))    Continuous Glucose Monitoring  Patient has CGM: Yes  Personal CGM type:: Dexcom G6 - pt denies device or site placement issues.  GMI Date: 12/17/24  GMI Value: 7.2 %    Lab Results   Component Value Date    HGBA1C 9.7 (H) 11/18/2024     Lifestyle Coping Support & Clinical    Problem Review  Active Comorbidities:  (HTN, HLD, Neuropathy, VitD defn, Bariatric surgery 6/1/21, IBS- diarrhea/constipation, GERD, DAVIS, Iron defn anemia, Asthma, Mental health ds, BMI 46.76.)    Diabetes Self-Management Skills Assessment    Medication Skills Assessment  Patient is able to identify current diabetes medications, dosages, and appropriate timing of medications.: no (Pt reports improved bolus entry but missing some snacks and also entering doses  late. She denies entering fake carbs. Irregular correction only bolus.)  Patient reports problems or concerns with current medication regimen.: no  Patient is  aware that some diabetes medications can cause low blood sugar?: Yes  Assessment indicates:: Instruction Needed  Area of need?: Yes    Nutrition/Healthy Eating  Meal Plan 24 Hour Recall - Breakfast: cereal (raisin bran ~2c) w/ whole milk  Meal Plan 24 Hour Recall - Lunch: canes 2pc, fries, 1.5 [place] toast OR wings, cheese fries, fried pickles w/ Ranch  Meal Plan 24 Hour Recall - Dinner: ice cream cone (2 w/ 2-3scoops ice cream)  Meal Plan 24 Hour Recall - Snack: apple  Meal Plan 24 Hour Recall - Beverage: water, diet  Patient can identify foods that impact blood sugar.: yes  Challenges to healthy eating:: snacking between meals and at night, eating out, going to parties  Nutrition/Healthy Eating Skills Assessment Completed:: Yes  Assessment indicates:: Adequate understanding  Area of need?: Yes    Physical Activity/Exercise  Patient's daily activity level::  (None recent due decreased exercise tolerance.)  Physical Activity/Exercise Skills Assessment Completed: : Yes  Assessment indicates:: Adequate understanding  Area of need?: Deferred    Home Blood Glucose Monitoring  Patient states that blood sugar is checked at home daily.: yes  Monitoring Method:: home glucometer, personal continuous glucose monitor  Home glucometer meter type:: Unknown & rarely using  Personal CGM type:: Dexcom G6 - pt denies device or site placement issues.  Home Blood Glucose Monitoring Skills Assessment Completed: : Yes  Assessment indicates:: Adequate understanding  Area of need?: No    Acute Complications  Have you ever had hypoglycemia (low BG 70 or less)?: no (Dexcom alerts. Pt denies symptoms.)  Have you ever had hyperglycemia (high  or more)?: no (Dexcom alerts. Pt denies symptoms.)  Acute Complications Skills Assessment Completed: : Yes  Assessment indicates:: Adequate  understanding  Area of need?: No    Assessment Summary and Plan  Based on today's diabetes care assessment, the following areas of need were identified:      Identified Areas of Need      Medication/Current Diabetes Treatment: Yes - see care plan   Nutrition/Healthy Eating: Yes - see care plan   Physical Activity/Exercise: Deferred    Home Blood Glucose Monitoring: No    Acute Complications: No      Today's interventions were provided through individual discussion, instruction, and written materials were provided.    Patient verbalized understanding of instruction and written materials.  Pt was able to return back demonstration of instructions today. Patient understood key points, needs reinforcement and further instruction.     Diabetes Self-Management Care Plan:  Today's Diabetes Self-Management Care Plan was developed with Yolanda's input. Yolanda has agreed to work toward the following goal(s) to improve his/her overall diabetes control.      Care Plan: Diabetes Management   Updates made since 12/18/2023 12:00 AM        Problem: Medications         Goal: Patient Agrees to take Diabetes Medication(s) as prescribed.    Start Date: 2/6/2024   Expected End Date: 2/5/2025   This Visit's Progress: On track   Recent Progress: On track   Priority: High   Barriers: Other (comments)   Note:    Encouraged progress of maintaining OP5 automated mode. Encouraged continued food records few days before appts to assist w/ report reviews.       Using OP5 reports, reviewed episodes of missed/late bolus dosing. Pt is using phone alerts for meal reminders. Suggested resetting meal alerts ~10-15min earlier to assist w/ entering bolus ahead of eating. Reminded pt of Nutrition Food gill (Cronometer) for carb ctg support and encouraged overall progress.        Encouraged upcoming follow-up with provider Abner for diabetes medical mgmt.     Problem: Healthy Eating         Goal: Eat 5 small meals daily to assist carb management and adequate  nutrition intake.    Start Date: 2/6/2024   Expected End Date: 2/5/2025   This Visit's Progress: No change   Recent Progress: On track   Priority: Medium   Barriers: Other (comments)   Note:    Encouraged improvement in meal frequency and carb/pro balance.. Reviewed role fat in increasing BG excursions and suggested healthier alternatives when dining out and at snacks (ex provided from pt's fav restaurants).     Follow Up Plan  Follow up in about 6 weeks (around 1/28/2025). Eval close encounter; pt may need new referral to start another episode of care.   -review OP5 reports  -eval goals    Today's care plan and follow up schedule was discussed with patient.  Yolanda verbalized understanding of the care plan, goals, and agrees to follow up plan.        The patient was encouraged to communicate with his/her health care provider/physician and care team regarding his/her condition(s) and treatment.  I provided the patient with my contact information today and encouraged to contact me via phone or [facility] Patient Portal as needed.     Length of Visit   Total Time: 30 Minutes

## 2024-12-17 NOTE — PROGRESS NOTES
Subjective:      Chief Complaint   Patient presents with    Other [facility] [place]     Hospital f/u- seizures-hematoma on brain/ frequent headaches/ appt with Neurology 12/26/24      Patient ID: Yolanda Betts is a 54 y.o. female.  History of Present Illness    Yolanda presents today for follow-up after recent hospitalization for  seizures and medication adjustments.      Transitional Care Note    Family and/or Caretaker present at visit?  No.  Diagnostic tests reviewed/disposition: No diagnosic tests pending after this hospitalization.  Disease/illness education: seizure and hematoma  Home health/community services discussion/referrals: Patient does not have home health established from hospital visit.  They do not need home health.  If needed, we will set up home health for the patient.   Establishment or re-establishment of referral orders for community resources: No other necessary community resources.   Discussion with other health care providers: No discussion with other health care providers necessary.        SEIZURES:  She reports experiencing two first-time seizure episodes. She continues to experience light sensitivity and headaches. She denies falling during seizures but reports possible head impact during episodes. No etiology found but CT showed a hematoma which was thought to be due to the fall sec to seizure.    HYPERTENSION: BP was found to be elevated today but she has resumed her med one day ago.      DIABETES:  Her blood sugar have been elevated over the past 2-3 months, reaching over 400, but are now improving with highest recent reading of 200. Her A1C is 9.6, which is higher than usual. She manages her diabetes with an insulin pump.    NUTRITION:  She is malnourished and taking vitamin D3 instead of prescribed vitamin D2.      ROS:  General: -fever, -chills, -fatigue, -weight gain, -weight loss  Eyes: -vision changes, -redness, -discharge  ENT: -ear pain, -nasal congestion, -sore throat  Cardiovascular:  -chest pain, -palpitations, -lower extremity edema  Respiratory: -cough, -shortness of breath  Gastrointestinal: -abdominal pain, -nausea, -vomiting, -diarrhea, -constipation, -blood in stool  Genitourinary: -dysuria, -hematuria, -frequency  Musculoskeletal: -joint pain, -muscle pain  Skin: -rash, -lesion  Neurological: +headache, -dizziness, -numbness, -tingling  Psychiatric: -anxiety, -depression, -sleep difficulty       Yolanda Betts's allergies, medications, history, and problem list were updated as appropriate.  Past Medical History:   Diagnosis Date    Abnormal Pap smear of cervix     HPV genital warts    Anemia     Anxiety     Arthritis     Asthma 10/11/2016    Bipolar 1 disorder     Diabetes mellitus, type 2     Dyslipidemia associated with type 2 diabetes mellitus 05/13/2019    Dyspnea due to COVID-19 09/12/2024    General anesthetics causing adverse effect in therapeutic use     Genital warts     GERD (gastroesophageal reflux disease)     Herpes simplex virus (HSV) infection     Hyperlipidemia     Hypertension     Hypertension complicating diabetes 05/05/2019    Migraine with aura and without status migrainosus, not intractable 03/21/2016    Mild persistent asthma without complication 10/11/2016    Morbid obesity with body mass index (BMI) of 45.0 to 49.9 in adult 08/03/2017    Myoclonus     Obstructive sleep apnea     ALEN (obstructive sleep apnea)     2L per N/C q HS    Schizoaffective disorder, bipolar type 04/25/2019    Seasonal allergic rhinitis due to pollen 05/13/2019    Seizures     Type 2 diabetes mellitus with hyperglycemia, with long-term current use of insulin 12/21/2017    Type 2 diabetes mellitus with hyperglycemia, without long-term current use of insulin 12/21/2017     Family History   Problem Relation Name Age of Onset    Diabetes Mother      Hyperlipidemia Mother      Hypertension Mother      Asthma Mother      COPD Mother      Glaucoma Mother       "Thyroid disease Mother      Anesthesia problems Mother          "almost had a cardiac arrest" , blood clots    Hypertension Father      Hyperlipidemia Father      Cancer Father          Brain, lung, liver, kidney    No Known Problems Sister      Hypertension Brother      Alcohol abuse Brother      Heart disease Maternal Grandmother      Hyperlipidemia Maternal Grandmother      Hypertension Maternal Grandmother      Cataracts Maternal Grandmother      Diabetes Maternal Grandmother      Heart disease Maternal Grandfather      Hyperlipidemia Maternal Grandfather      Hypertension Maternal Grandfather      Glaucoma Maternal Grandfather      Cancer Maternal Grandfather      Cataracts Maternal Grandfather      Macular degeneration Maternal Grandfather      Diabetes Maternal Grandfather      Heart disease Paternal Grandmother      Hyperlipidemia Paternal Grandmother      Hypertension Paternal Grandmother      Cataracts Paternal Grandmother      Heart disease Paternal Grandfather      Hyperlipidemia Paternal Grandfather      Hypertension Paternal Grandfather      Cataracts Paternal Grandfather      Breast cancer Maternal Cousin      Breast cancer Maternal Cousin      Breast cancer Maternal Cousin      Breast cancer Maternal Cousin       Social History     Socioeconomic History    Marital status: Single   Tobacco Use    Smoking status: Never     Passive exposure: Never    Smokeless tobacco: Never   Substance and Sexual Activity    Alcohol use: Not Currently     Comment: socially  No alcohol 72h prior to sx    Drug use: No    Sexual activity: Yes     Partners: Male     Birth control/protection: Surgical     Comment: john   Social History Narrative    Long-term care nurse     Social Drivers of Health     Financial Resource Strain: Medium Risk (11/30/2024)    Overall Financial Resource Strain (CARDIA)     Difficulty of Paying Living Expenses: Somewhat hard   Food Insecurity: Food " "Insecurity Present (11/30/2024)    Hunger Vital Sign     Worried About Running Out of Food in the Last Year: Often true     Ran Out of Food in the Last Year: Often true   Transportation Needs: No Transportation Needs (11/30/2024)    TRANSPORTATION NEEDS     Transportation : No   Physical Activity: Inactive (10/15/2024)    Exercise Vital Sign     Days of Exercise per Week: 0 days     Minutes of Exercise per Session: 0 min   Stress: Stress Concern Present (11/30/2024)    [employer] [place] [employer] - Occupational Stress Questionnaire     Feeling of Stress : Very much   Housing Stability: High Risk (11/30/2024)    Housing Stability Vital Sign     Unable to Pay for Housing in the Last Year: Yes     Homeless in the Last Year: No     Outpatient Encounter Medications as of 12/17/2024   Medication Sig Dispense Refill    albuterol (PROVENTIL) 2.5 mg /3 mL (0.083 %) nebulizer solution Take 2.5 mg by nebulization every 4 (four) hours as needed. Times a day 75 mL 1    albuterol (PROVENTIL/VENTOLIN HFA) 90 mcg/actuation inhaler Inhale 2 puffs into the lungs every 6 (six) hours as needed for Wheezing. 8.5 g 1    ALPRAZolam (XANAX) 1 MG tablet Take 1 tablet (1 mg total) by mouth 2 (two) times daily as needed for Anxiety. 60 tablet 2    amLODIPine (NORVASC) 5 MG tablet Take 1 tablet (5 mg total) by mouth once daily. 30 tablet 11    atorvastatin (LIPITOR) 20 MG tablet Take 1 tablet (20 mg total) by mouth every evening 90 tablet 3    BD INSULIN SYRINGE ULTRA-FINE 1/2 mL 30 gauge x 1/2" Syrg   0    blood-glucose sensor (DEXCOM G6 SENSOR) Mariela change sensor every 10 days. 3 each 11    blood-glucose sensor (DEXCOM G7 SENSOR) Mariela Use to check bg. Change every 10 days 3 each 11    blood-glucose transmitter (DEXCOM G6 TRANSMITTER) Mariela 1 each by Misc.(Non-Drug; Combo Route) route every 3 (three) months. 1 each 3    blood-glucose transmitter (DEXCOM G6 TRANSMITTER) Mariela Use as directed change every 3 months 1 " each 3    cevimeline (EVOXAC) 30 mg capsule Take 1 capsule by mouth in the morning and 1 capsule at noon and 1 capsule before bedtime. Do all this for 30 days. 90 capsule 0    cyclobenzaprine (FLEXERIL) 10 MG tablet Take 1 tablet (10 mg total) by mouth 3 (three) times daily as needed (Pain). 90 tablet 11    DULoxetine (CYMBALTA) 60 MG capsule Take 1 capsule by mouth 2 times daily 180 capsule 3    estradioL (ESTRACE) 0.01 % (0.1 mg/gram) vaginal cream Place 1 g vaginally twice a week. 42.5 g 3    fenofibrate (TRICOR) 145 MG tablet Take 1 tablet by mouth in the morning. 30 tablet 5    fluticasone propionate (FLONASE) 50 mcg/actuation nasal spray Take 1 spray by Each Nostril route in the morning. 16 g 3    furosemide (LASIX) 20 MG tablet Take 1 tablet (20 mg total) by mouth once daily. 90 tablet 3    glucagon (BAQSIMI) 3 mg/actuation Spry 1 spray by Nasal route as needed (hypoglycemia). For emergency use. May repeat 1 time after 15 minutes 2 each 1    glucagon (BAQSIMI) 3 mg/actuation Downing SPRAY 1 SPRAY IN NOSTRIL AS NEEDED FOR EMERGENCY. MAY REPEAT 1 TIME AFTER 15 MINUTES 2 each 1    insulin aspart U-100 (NOVOLOG FLEXPEN U-100 INSULIN) 100 unit/mL (3 mL) InPn pen Inject up to 10 units under the skin per sliding scale 3 times a day before meals 15 mL 3    insulin aspart U-100 (NOVOLOG U-100 INSULIN ASPART) 100 unit/mL injection To use via insulin pump. Up to 200 units every 3 days 20 mL 5    insulin aspart U-100 (NOVOLOG) 100 unit/mL injection NovoLOG FlexPen      insulin glargine U-100, Lantus, (LANTUS SOLOSTAR U-100 INSULIN) 100 unit/mL (3 mL) InPn pen Inject up to 50 units daily in event of pump failure 15 mL 3    insulin pump cart,auto,BT,G6/7 (OMNIPOD 5 G6-G7 PODS, GEN 5,) Crtg 1 each by misc route every 48 hours 180 each 0    insulin pump cart,automated,BT (OMNIPOD 5 G6 PODS, GEN 5,) Crtg 1 each by Misc.(Non-Drug; Combo Route) route every 48 hours. 45 each 3    insulin syringe-needle U-100 0.3 mL  "30 gauge x 5/16" Syrg 1 each by Misc.(Non-Drug; Combo Route) route Every 3 (three) days. 100 each 2    lamoTRIgine (LAMICTAL) 100 MG tablet Take 1 tablet (100 mg total) by mouth every morning. 30 tablet 2    levocetirizine (XYZAL) 5 MG tablet Take 1 tablet (5 mg total) by mouth every evening. 30 tablet 11    LIDOcaine (LIDODERM) 5 % Place 2 patches onto the skin every 12 (twelve) hours as needed (pain). Remove & Discard patch within 12 hours or as directed by MD 60 patch 11    lifitegrast (XIIDRA) 5 % Dpet Place 1 drop into both eyes 2 (two) times daily. 60 each 12    lumateperone (CAPLYTA) 10.5 mg Cap Take 1 capsule (10.5 mg total) by mouth once daily. 30 capsule 1    magic mouthwash diphen/antac/lidoc/nysta Swish and spit 5 mLs every 4 (four) hours as needed. 118 mL 0    magnesium hydroxide 400 mg/5 ml (MILK OF MAGNESIA) 400 mg/5 mL Susp Take 5 mLs by mouth every 4 (four) hours as needed.      magnesium oxide (MAG-OX) 400 mg (241.3 mg magnesium) tablet Take 1 tablet (400 mg total) by mouth once daily. 90 tablet 3    methylPREDNISolone (MEDROL DOSEPACK) 4 mg tablet use as directed 21 tablet 1    metoprolol succinate (TOPROL-XL) 50 MG 24 hr tablet Take 1 tablet (50 mg total) by mouth every morning. 30 tablet 11    nebulizer and compressor (COMP-AIR NEBULIZER COMPRESSOR) Mariela use as directed 1 each 0    omeprazole (PRILOSEC) 40 MG capsule Take 1 capsule (40 mg total) by mouth once daily. 90 capsule 1    OZEMPIC 2 mg/dose (8 mg/3 mL) PnIj Inject 2 mg into the skin every 7 days.      pen needle, diabetic (BD ULTRA-FINE MINI PEN NEEDLE) 31 gauge x 3/16" Ndle Use 1 a day in event of pump failure 100 each 11    polyethylene glycol (GLYCOLAX) 17 gram/dose powder Take 17 g by mouth 2 (two) times daily. 1020 g 11    promethazine (PHENERGAN) 25 MG tablet Take 25 mg by mouth every 6 (six) hours as needed.      secukinumab (COSENTYX PEN, 2 PENS,) 150 mg/mL Gloria Inject 300 mg into the skin every 14 (fourteen) " days. 4 mL 11    semaglutide (OZEMPIC) 2 mg/dose (8 mg/3 mL) PnIj Inject 2 mg into the skin every 7 days. 3 mL 2    semaglutide (OZEMPIC) 2 mg/dose (8 mg/3 mL) PnIj Inject 2 mg under the skin every 7 days. 3 mL 2    spironolactone (ALDACTONE) 25 MG tablet Take 1 tablet by mouth in the morning. 90 tablet 1    sulfaSALAzine (AZULFIDINE) 500 MG EC tablet Take 1 tablet (500 mg total) by mouth 2 (two) times daily. 60 tablet 0    SYMBICORT 160-4.5 mcg/actuation HFAA Inhale 2 puffs into the lungs in the morning and 2 puffs before bedtime. 2 puffs every 12 hours 10.2 g 2    triamcinolone acetonide 0.1% (KENALOG) 0.1 % paste Apply coating 2 times daily 5 g 12    zolpidem (AMBIEN) 5 MG Tab Take 1 tablet (5 mg total) by mouth nightly as needed (anxiety). 30 tablet 2    [DISCONTINUED] VITAMIN D2 1,250 mcg (50,000 unit) capsule Take 1 capsule by mouth Take once weekly. 1 capsule every 14 days 4 capsule 3    blood-glucose meter,continuous (DEXCOM G7 ) Misc 1 each by Misc.(Non-Drug; Combo Route) route once. for 1 dose 1 each 0    cholecalciferol, vitamin D3, 1,250 mcg (50,000 unit) Tab Take 1 capsule by mouth every 7 days. 12 tablet 3    levETIRAcetam (KEPPRA) 500 MG Tab Take 1 tablet (500 mg total) by mouth 2 (two) times daily. for 5 days 10 tablet 0    [] senna-docusate 8.6-50 mg (PERICOLACE) 8.6-50 mg per tablet Take 1 tablet by mouth 2 (two) times daily. for 10 days 20 tablet 0    [DISCONTINUED] clonazePAM (KLONOPIN) 1 MG tablet Take 1 tablet by mouth daily 30 tablet 0    [DISCONTINUED] glucagon, human recombinant, (GLUCAGON EMERGENCY KIT, HUMAN,) 1 mg SolR Inject 1 mg into the muscle as needed. Emergency use 1 each 1    [DISCONTINUED] valsartan (DIOVAN) 320 MG tablet Take 1 tablet (320 mg total) by mouth once daily. 90 tablet 3     No facility-administered encounter medications on file as of 2024.      Narrative & Impression  EXAM: EXAM:  CT HEAD WITHOUT CONTRAST     CLINICAL HISTORY:  Trauma, loss of consciousness     COMPARISON: 11/30/2024     TECHNIQUE: Standard thin-section axial images, with reformatted sagittal and coronal images.     FINDINGS: There is no evidence of acute hemorrhage, focal infarction or midline shift. The ventricles and extra-axial fluid spaces appear normal. Orbital contents appear unremarkable. Visualized paranasal sinuses and mastoid air spaces are clear. No skull fractures are identified and no significant soft tissue reaction is visible in the scalp.        Impression:   No acute intracranial abnormalities are identified.     All CT scans at this location are performed using dose modulation techniques as appropriate to a performed exam including the following: Automated exposure control; adjustment of the mA and/or kV according to patient size (this includes techniques or standardized protocols for targeted exams where dose is matched to indication / reason for exam; i.e. extremities or head); use of iterative reconstruction technique.     Finalized on: 12/16/2024 3:05 PM By:  Cas Pablo  [facility]# [id number]      2024-12-16 15:07:56.295    [facility]    Objective:      Pulse 84   Temp 97.9 °F (36.6 °C)   Wt 94.5 kg (208 lb 6.4 oz)   SpO2 98%   BMI 46.76 kg/m²   Physical Exam  Vitals and nursing note reviewed.   Constitutional:       General: She is not in acute distress.  HENT:      Head: Normocephalic and atraumatic.   Cardiovascular:      Rate and Rhythm: Normal rate and regular rhythm.      Pulses: Normal pulses.      Heart sounds: No murmur heard.  Pulmonary:      Effort: Pulmonary effort is normal. No respiratory distress.      Breath sounds: Normal breath sounds. No wheezing or rhonchi.   Musculoskeletal:         General: No swelling or deformity.      Right lower leg: No edema.      Left lower leg: No edema.   Neurological:      General: No focal deficit present.      Mental Status: She is alert.      Cranial Nerves: No cranial nerve deficit.   Psychiatric:          Behavior: Behavior normal.         Thought Content: Thought content normal.      Physical Exam             Results for orders placed or performed during the hospital encounter of 11/30/24   Comprehensive Metabolic Panel    Collection Time: 11/30/24  2:56 AM   Result Value Ref Range    Sodium 135 (L) 136 - 145 mmol/L    Potassium 4.3 3.5 - 5.1 mmol/L    Chloride 104 95 - 110 mmol/L    CO2 24 23 - 29 mmol/L    Glucose 138 (H) 70 - 110 mg/dL    BUN 11 6 - 20 mg/dL    Creatinine 0.7 0.5 - 1.4 mg/dL    Calcium 9.4 8.7 - 10.5 mg/dL    Total Protein 6.7 6.0 - 8.4 g/dL    Albumin 3.2 (L) 3.5 - 5.2 g/dL    Total Bilirubin 0.3 0.1 - 1.0 mg/dL    Alkaline Phosphatase 88 40 - 150 U/L    AST 16 10 - 40 U/L    ALT 19 10 - 44 U/L    eGFR >60.0 >60 mL/min/1.73 m^2    Anion Gap 7 (L) 8 - 16 mmol/L   Lipid panel    Collection Time: 11/30/24  2:56 AM   Result Value Ref Range    Cholesterol 179 120 - 199 mg/dL    Triglycerides 207 (H) 30 - 150 mg/dL    HDL 46 40 - 75 mg/dL    LDL Cholesterol 91.6 63.0 - 159.0 mg/dL    HDL/Cholesterol Ratio 25.7 20.0 - 50.0 %    Total Cholesterol/HDL Ratio 3.9 2.0 - 5.0    Non-HDL Cholesterol 133 mg/dL   TSH    Collection Time: 11/30/24  2:56 AM   Result Value Ref Range    TSH 1.984 0.400 - 4.000 uIU/mL   CBC auto differential    Collection Time: 11/30/24  2:56 AM   Result Value Ref Range    WBC 11.15 3.90 - 12.70 K/uL    RBC 4.00 4.00 - 5.40 M/uL    Hemoglobin 9.4 (L) 12.0 - 16.0 g/dL    Hematocrit 31.0 (L) 37.0 - 48.5 %    MCV 78 (L) 82 - 98 fL    MCH 23.5 (L) 27.0 - 31.0 pg    MCHC 30.3 (L) 32.0 - 36.0 g/dL    RDW 16.1 (H) 11.5 - 14.5 %    Platelets 398 150 - 450 K/uL    MPV 9.4 9.2 - 12.9 fL    Immature Granulocytes 0.5 0.0 - 0.5 %    Gran # (ANC) 7.7 1.8 - 7.7 K/uL    Immature Grans (Abs) 0.06 (H) 0.00 - 0.04 K/uL    Lymph # 2.8 1.0 - 4.8 K/uL    Mono # 0.6 0.3 - 1.0 K/uL    Eos # 0.0 0.0 - 0.5 K/uL    Baso # 0.01 0.00 - 0.20 K/uL    nRBC 0 0 /100 WBC    Gran % 68.8 38.0 - 73.0 %    Lymph %  25.0 18.0 - 48.0 %    Mono % 5.6 4.0 - 15.0 %    Eosinophil % 0.0 0.0 - 8.0 %    Basophil % 0.1 0.0 - 1.9 %    Differential Method Automated    Magnesium    Collection Time: 11/30/24  2:56 AM   Result Value Ref Range    Magnesium 1.9 1.6 - 2.6 mg/dL   Phosphorus    Collection Time: 11/30/24  2:56 AM   Result Value Ref Range    Phosphorus 3.8 2.7 - 4.5 mg/dL   APTT    Collection Time: 11/30/24  2:56 AM   Result Value Ref Range    aPTT 28.0 21.0 - 32.0 sec   Protime-INR    Collection Time: 11/30/24  2:56 AM   Result Value Ref Range    Prothrombin Time 10.8 9.0 - 12.5 sec    INR 1.0 0.8 - 1.2   Brain Natriuretic Peptide (BNP)    Collection Time: 11/30/24  2:56 AM   Result Value Ref Range    BNP <10 0 - 99 pg/mL   Type & Screen    Collection Time: 11/30/24  2:56 AM   Result Value Ref Range    Group & Rh O POS     Indirect Alisson NEG     Specimen Outdate 12/03/2024 23:59    POCT glucose    Collection Time: 11/30/24  6:20 AM   Result Value Ref Range    POCT Glucose 96 70 - 110 mg/dL   POCT glucose    Collection Time: 11/30/24  8:18 AM   Result Value Ref Range    POCT Glucose 123 (H) 70 - 110 mg/dL   POCT glucose    Collection Time: 11/30/24 11:14 AM   Result Value Ref Range    POCT Glucose 142 (H) 70 - 110 mg/dL   Urinalysis, Reflex to Urine Culture Urine, Catheterized    Collection Time: 11/30/24  1:37 PM    Specimen: Urine   Result Value Ref Range    Specimen UA Urine, Catheterized     Color, UA Yellow Yellow, Straw, Milagros    Appearance, UA Clear Clear    pH, UA 6.0 5.0 - 8.0    Specific Gravity, UA 1.010 1.005 - 1.030    Protein, UA Negative Negative    Glucose, UA Negative Negative    Ketones, UA Negative Negative    Bilirubin (UA) Negative Negative    Occult Blood UA Negative Negative    Nitrite, UA Negative Negative    Leukocytes, UA Negative Negative   POCT glucose    Collection Time: 11/30/24  5:13 PM   Result Value Ref Range    POCT Glucose 175 (H) 70 - 110 mg/dL   Echo    Collection Time: 11/30/24  5:40 PM    Result Value Ref Range    LV Diastolic Volume 89.56 mL    Echo EF Estimated 75 %    LV Systolic Volume 22.11 mL    IVS 0.9 0.6 - 1.1 cm    LVIDd 4.4 3.5 - 6.0 cm    LVIDs 2.5 2.1 - 4.0 cm    LVOT diameter 2.0 cm    PW 1.0 0.6 - 1.1 cm    IVRT 104.66 ms    AV LVOT peak gradient 6 mmHg    LVOT mn grad 4 mmHg    LVOT peak parker 1.3 m/s    LVOT peak VTI 19.4 cm    RV- rodriguez basal diam 3.0 cm    RV S' 16.72 cm/s    LA size 3.53 cm    Left Atrium Major Axis 5.13 cm    Left Atrium Minor Axis 5.15 cm    LA Vol (MOD) 57.03 mL    RA Major Port [place] 4.20 cm    AV valve area 2.1 cm2    AV area by cont VTI 2.1 cm2    AV peak gradient 10.2 mmHg    AV mean gradient 5.7 mmHg    Ao peak parker 1.6 m/s    Ao VTI 28.8 cm    MV Peak A Parker 0.83 m/s    E wave deceleration time 156.53 ms    MV Peak E Parker 0.80 m/s    E/A ratio 0.96     LV LATERAL E/E' RATIO 4.71     LV SEPTAL E/E' RATIO 8.89     TDI LATERAL 0.17 m/s    TDI SEPTAL 0.09 m/s    Ascending aorta 2.35 cm    STJ 2.34 cm    Sinus 2.77 cm    LA WIDTH 3.86 cm    RA Width 2.63 cm    TAPSE 2.42 cm    BSA 1.9 m2    LVOT stroke volume 60.9 cm3    LV Systolic Volume Index 12.4 mL/m2    LV Diastolic Volume Index 50.31 mL/m2    LVOT area 3.1 cm2    FS 43.2 28 - 44 %    Left Ventricle Relative Wall Thickness 0.45 cm    LV mass 137.8 g    LV Mass Index 77.4 g/m2    E/E' ratio 6.15 m/s    GUERRERO 33.4 mL/m2    LA Vol 59.53 cm3    RV/LV Ratio 0.68 cm    GUERRERO (MOD) 32.0 mL/m2    AV Velocity Ratio 0.81     AV index (prosthetic) 0.67     TAINA by Velocity Ratio 2.6 cm²    Mean e' 0.13 m/s    ZLVIDS -1.55     ZLVIDD -1.12     EF 65 %    Est. RA pres 3 mmHg   POCT glucose    Collection Time: 11/30/24  6:44 PM   Result Value Ref Range    POCT Glucose 249 (H) 70 - 110 mg/dL   POCT glucose    Collection Time: 11/30/24  9:03 PM   Result Value Ref Range    POCT Glucose 212 (H) 70 - 110 mg/dL   Comprehensive Metabolic Panel    Collection Time: 12/01/24 12:51 AM   Result Value Ref Range    Sodium 136 136 - 145 mmol/L     Potassium 4.3 3.5 - 5.1 mmol/L    Chloride 104 95 - 110 mmol/L    CO2 25 23 - 29 mmol/L    Glucose 110 70 - 110 mg/dL    BUN 10 6 - 20 mg/dL    Creatinine 0.7 0.5 - 1.4 mg/dL    Calcium 8.9 8.7 - 10.5 mg/dL    Total Protein 6.1 6.0 - 8.4 g/dL    Albumin 2.8 (L) 3.5 - 5.2 g/dL    Total Bilirubin 0.1 0.1 - 1.0 mg/dL    Alkaline Phosphatase 80 40 - 150 U/L    AST 15 10 - 40 U/L    ALT 16 10 - 44 U/L    eGFR >60.0 >60 mL/min/1.73 m^2    Anion Gap 7 (L) 8 - 16 mmol/L   Phosphorus    Collection Time: 12/01/24 12:51 AM   Result Value Ref Range    Phosphorus 3.6 2.7 - 4.5 mg/dL   Magnesium    Collection Time: 12/01/24 12:51 AM   Result Value Ref Range    Magnesium 2.0 1.6 - 2.6 mg/dL   POCT glucose    Collection Time: 12/01/24  5:49 AM   Result Value Ref Range    POCT Glucose 137 (H) 70 - 110 mg/dL   CBC auto differential    Collection Time: 12/01/24  5:52 AM   Result Value Ref Range    WBC 6.11 3.90 - 12.70 K/uL    RBC 4.20 4.00 - 5.40 M/uL    Hemoglobin 9.7 (L) 12.0 - 16.0 g/dL    Hematocrit 32.7 (L) 37.0 - 48.5 %    MCV 78 (L) 82 - 98 fL    MCH 23.1 (L) 27.0 - 31.0 pg    MCHC 29.7 (L) 32.0 - 36.0 g/dL    RDW 16.6 (H) 11.5 - 14.5 %    Platelets 421 150 - 450 K/uL    MPV 9.8 9.2 - 12.9 fL    Immature Granulocytes 0.7 (H) 0.0 - 0.5 %    Gran # (ANC) 2.9 1.8 - 7.7 K/uL    Immature Grans (Abs) 0.04 0.00 - 0.04 K/uL    Lymph # 2.8 1.0 - 4.8 K/uL    Mono # 0.4 0.3 - 1.0 K/uL    Eos # 0.0 0.0 - 0.5 K/uL    Baso # 0.00 0.00 - 0.20 K/uL    nRBC 0 0 /100 WBC    Gran % 47.5 38.0 - 73.0 %    Lymph % 45.3 18.0 - 48.0 %    Mono % 6.5 4.0 - 15.0 %    Eosinophil % 0.0 0.0 - 8.0 %    Basophil % 0.0 0.0 - 1.9 %    Differential Method Automated    POCT glucose    Collection Time: 12/01/24  7:45 AM   Result Value Ref Range    POCT Glucose 135 (H) 70 - 110 mg/dL   POCT glucose    Collection Time: 12/01/24 11:39 AM   Result Value Ref Range    POCT Glucose 135 (H) 70 - 110 mg/dL   POCT glucose    Collection Time: 12/01/24  9:52 PM   Result  Value Ref Range    POCT Glucose 108 70 - 110 mg/dL   CBC auto differential    Collection Time: 12/02/24  3:47 AM   Result Value Ref Range    WBC 7.21 3.90 - 12.70 K/uL    RBC 3.92 (L) 4.00 - 5.40 M/uL    Hemoglobin 8.9 (L) 12.0 - 16.0 g/dL    Hematocrit 30.9 (L) 37.0 - 48.5 %    MCV 79 (L) 82 - 98 fL    MCH 22.7 (L) 27.0 - 31.0 pg    MCHC 28.8 (L) 32.0 - 36.0 g/dL    RDW 16.6 (H) 11.5 - 14.5 %    Platelets 373 150 - 450 K/uL    MPV 9.3 9.2 - 12.9 fL    Immature Granulocytes 0.4 0.0 - 0.5 %    Gran # (ANC) 3.7 1.8 - 7.7 K/uL    Immature Grans (Abs) 0.03 0.00 - 0.04 K/uL    Lymph # 2.9 1.0 - 4.8 K/uL    Mono # 0.6 0.3 - 1.0 K/uL    Eos # 0.0 0.0 - 0.5 K/uL    Baso # 0.00 0.00 - 0.20 K/uL    nRBC 0 0 /100 WBC    Gran % 51.2 38.0 - 73.0 %    Lymph % 40.1 18.0 - 48.0 %    Mono % 8.3 4.0 - 15.0 %    Eosinophil % 0.0 0.0 - 8.0 %    Basophil % 0.0 0.0 - 1.9 %    Differential Method Automated    Comprehensive metabolic panel    Collection Time: 12/02/24  3:47 AM   Result Value Ref Range    Sodium 139 136 - 145 mmol/L    Potassium 4.5 3.5 - 5.1 mmol/L    Chloride 106 95 - 110 mmol/L    CO2 27 23 - 29 mmol/L    Glucose 101 70 - 110 mg/dL    BUN 11 6 - 20 mg/dL    Creatinine 0.7 0.5 - 1.4 mg/dL    Calcium 9.3 8.7 - 10.5 mg/dL    Total Protein 6.2 6.0 - 8.4 g/dL    Albumin 3.1 (L) 3.5 - 5.2 g/dL    Total Bilirubin 0.2 0.1 - 1.0 mg/dL    Alkaline Phosphatase 81 40 - 150 U/L    AST 13 10 - 40 U/L    ALT 17 10 - 44 U/L    eGFR >60.0 >60 mL/min/1.73 m^2    Anion Gap 6 (L) 8 - 16 mmol/L   Magnesium    Collection Time: 12/02/24  3:47 AM   Result Value Ref Range    Magnesium 2.0 1.6 - 2.6 mg/dL   Phosphorus    Collection Time: 12/02/24  3:47 AM   Result Value Ref Range    Phosphorus 4.4 2.7 - 4.5 mg/dL   POCT glucose    Collection Time: 12/02/24  7:48 AM   Result Value Ref Range    POCT Glucose 106 70 - 110 mg/dL   POCT glucose    Collection Time: 12/02/24 11:42 AM   Result Value Ref Range    POCT Glucose 127 (H) 70 - 110 mg/dL   POCT  glucose    Collection Time: 12/02/24  3:19 PM   Result Value Ref Range    POCT Glucose 176 (H) 70 - 110 mg/dL     *Note: Due to a large number of results and/or encounters for the requested time period, some results have not been displayed. A complete set of results can be found in Results Review.     Assessment/Plan:         1. Hospital discharge follow-up    2. SDH (subdural hematoma)    3. Essential hypertension    4. Type 2 diabetes mellitus with hyperglycemia, with long-term current use of insulin    5. Ankylosing spondylitis of thoracolumbar region    6. Paranoid schizophrenia    7. Vitamin D deficiency      Hospital discharge follow-up  Comments:  here for discharge follow, has a pcp, doing well, requesting med refills and has all her visits with specialties scheduled    SDH (subdural hematoma)  Comments:  still having residual headaches but she is shceduled to see them on the 26th. No seizure since and repeat CT 12/16 is normal. Denies need for headache med.    Essential hypertension  Comments:  Not quite controlled, All HTN meds discontinued while in hospital and she restarted med one day ago. Allow one week. Follow up with PCP    Type 2 diabetes mellitus with hyperglycemia, with long-term current use of insulin  Comments:  Uncontrtolled, She is now on insulin pump. Stopped taking the ozempic. See Negra Johnson, NP for the pump, and a nutritioninst too  Orders:  -     Microalbumin/Creatinine Ratio, Urine; Future; Expected date: 12/17/2024    Ankylosing spondylitis of thoracolumbar region  Comments:  -She started the consentyx    Paranoid schizophrenia  Comments:  She is on Calypta which has Seizure as a side effect. Advised to hodl and she will notify the psychiatrist today. And she will notify the clinic    Vitamin D deficiency  -     cholecalciferol, vitamin D3, 1,250 mcg (50,000 unit) Tab; Take 1 capsule by mouth every 7 days.  Dispense: 12 tablet; Refill: 3      EPILEPSY:  - Reviewed patient's recent  seizure episodes, brain MRI, and CT results.  - Considered potential medication-induced seizure, noting Tonica as a possible cause.    HYPERTENSION:  - Assessed blood pressure management, noting recent medication changes by cardiologist.  - Yolanda to monitor blood pressure at home for 1 week with current medication regimen.  - Continued amlodipine 5 mg daily, metoprolol 50 mg at night, spironolactone during daytime.  - Advised potential increase of spironolactone to 50 mg daily if blood pressure remains elevated after 1 week.    TYPE 2 DIABETES MELLITUS:  - Evaluated diabetes management, noting recent elevated A1C (9.6) and current use of insulin pump.  - Urine microalbumin test ordered for diabetes management.    VITAMIN D DEFICIENCY:  - Considered vitamin D supplementation in context of patient's reported malnutrition.  - Explained importance of maintaining vitamin D levels between 20-40 ng/mL, noting potential risks associated with levels above 50 ng/mL.  - Discussed difference between vitamin D2 (plant-based) and D3 (animal-based) sources.  - Continued vitamin D2 supplementation once weekly.  - Check vitamin D levels at next visit.    MALNUTRITION:  - Recommend adding protein shakes or protein powder to diet to address malnutrition.    FOLLOW-UP:  - Follow up in 3 months.            I have reviewed all of the patient's clinical history available in care everywhere and Epic and have utilized this in my evaluation and management recommendations today.      Treatment options and alternatives were discussed with the patient. Patient was given ample time to ask questions. All questions were answered. Voices understanding and acceptance of this advice. Will call back if any further questions or concerns.             This note was generated with the assistance of ambient listening technology. Verbal consent was obtained by the patient and accompanying visitor(s) for the recording of patient appointment to facilitate  this note. I attest to having reviewed and edited the generated note for accuracy, though some syntax or spelling errors may persist. Please contact the author of this note for any clarification.            Sasha Sorenson MD  [facility],

## 2024-12-18 ENCOUNTER — HOSPITAL ENCOUNTER (EMERGENCY)
Facility: HOSPITAL | Age: 54
Discharge: HOME | End: 2024-12-18
Attending: EMERGENCY MEDICINE
Payer: MEDICAID

## 2024-12-18 ENCOUNTER — TELEPHONE (OUTPATIENT)
Dept: NEUROSURGERY | Facility: CLINIC | Age: 54
End: 2024-12-18
Payer: MEDICAID

## 2024-12-18 VITALS
HEIGHT: 56 IN | BODY MASS INDEX: 46.88 KG/M2 | WEIGHT: 208.38 LBS | SYSTOLIC BLOOD PRESSURE: 140 MMHG | TEMPERATURE: 99 F | RESPIRATION RATE: 18 BRPM | OXYGEN SATURATION: 98 % | HEART RATE: 92 BPM | DIASTOLIC BLOOD PRESSURE: 63 MMHG

## 2024-12-18 DIAGNOSIS — R51.9 NONINTRACTABLE HEADACHE, UNSPECIFIED CHRONICITY PATTERN, UNSPECIFIED HEADACHE TYPE: Primary | ICD-10-CM

## 2024-12-18 LAB
ALBUMIN SERPL BCP-MCNC: 3.4 G/DL (ref 3.5–5.2)
ALP SERPL-CCNC: 89 U/L (ref 40–150)
ALT SERPL W/O P-5'-P-CCNC: 16 U/L (ref 10–44)
ANION GAP SERPL CALC-SCNC: 11 MMOL/L (ref 8–16)
AST SERPL-CCNC: 11 U/L (ref 10–40)
BASOPHILS # BLD AUTO: 0.01 K/UL (ref 0–0.2)
BASOPHILS NFR BLD: 0.1 % (ref 0–1.9)
BILIRUB SERPL-MCNC: 0.2 MG/DL (ref 0.1–1)
BUN SERPL-MCNC: 10 MG/DL (ref 6–20)
CALCIUM SERPL-MCNC: 9.9 MG/DL (ref 8.7–10.5)
CHLORIDE SERPL-SCNC: 101 MMOL/L (ref 95–110)
CO2 SERPL-SCNC: 24 MMOL/L (ref 23–29)
CREAT SERPL-MCNC: 0.7 MG/DL (ref 0.5–1.4)
DIFFERENTIAL METHOD BLD: ABNORMAL
EOSINOPHIL # BLD AUTO: 0 K/UL (ref 0–0.5)
EOSINOPHIL NFR BLD: 0 % (ref 0–8)
ERYTHROCYTE [DISTWIDTH] IN BLOOD BY AUTOMATED COUNT: 15.7 % (ref 11.5–14.5)
EST. GFR  (NO RACE VARIABLE): >60 ML/MIN/1.73 M^2
GLUCOSE SERPL-MCNC: 116 MG/DL (ref 70–110)
HCT VFR BLD AUTO: 32.4 % (ref 37–48.5)
HGB BLD-MCNC: 9.4 G/DL (ref 12–16)
IMM GRANULOCYTES # BLD AUTO: 0.1 K/UL (ref 0–0.04)
IMM GRANULOCYTES NFR BLD AUTO: 1.1 % (ref 0–0.5)
LYMPHOCYTES # BLD AUTO: 3.1 K/UL (ref 1–4.8)
LYMPHOCYTES NFR BLD: 35 % (ref 18–48)
MAGNESIUM SERPL-MCNC: 1.9 MG/DL (ref 1.6–2.6)
MCH RBC QN AUTO: 22.6 PG (ref 27–31)
MCHC RBC AUTO-ENTMCNC: 29 G/DL (ref 32–36)
MCV RBC AUTO: 78 FL (ref 82–98)
MONOCYTES # BLD AUTO: 0.5 K/UL (ref 0.3–1)
MONOCYTES NFR BLD: 5.9 % (ref 4–15)
NEUTROPHILS # BLD AUTO: 5.2 K/UL (ref 1.8–7.7)
NEUTROPHILS NFR BLD: 57.9 % (ref 38–73)
NRBC BLD-RTO: 0 /100 WBC
PHOSPHATE SERPL-MCNC: 3.9 MG/DL (ref 2.7–4.5)
PLATELET # BLD AUTO: 390 K/UL (ref 150–450)
PMV BLD AUTO: 8.8 FL (ref 9.2–12.9)
POCT GLUCOSE: 138 MG/DL (ref 70–110)
POTASSIUM SERPL-SCNC: 5 MMOL/L (ref 3.5–5.1)
PROT SERPL-MCNC: 7.1 G/DL (ref 6–8.4)
RBC # BLD AUTO: 4.16 M/UL (ref 4–5.4)
SODIUM SERPL-SCNC: 136 MMOL/L (ref 136–145)
TSH SERPL DL<=0.005 MIU/L-ACNC: 1.89 UIU/ML (ref 0.4–4)
WBC # BLD AUTO: 8.94 K/UL (ref 3.9–12.7)

## 2024-12-18 PROCEDURE — 63600175 PHARM REV CODE 636 W HCPCS: Performed by: EMERGENCY MEDICINE

## 2024-12-18 PROCEDURE — 82962 GLUCOSE BLOOD TEST: CPT

## 2024-12-18 PROCEDURE — 84443 ASSAY THYROID STIM HORMONE: CPT | Performed by: EMERGENCY MEDICINE

## 2024-12-18 PROCEDURE — 96375 TX/PRO/DX INJ NEW DRUG ADDON: CPT

## 2024-12-18 PROCEDURE — 83735 ASSAY OF MAGNESIUM: CPT | Performed by: EMERGENCY MEDICINE

## 2024-12-18 PROCEDURE — 99285 EMERGENCY DEPT VISIT HI MDM: CPT | Mod: 25

## 2024-12-18 PROCEDURE — 84100 ASSAY OF PHOSPHORUS: CPT | Performed by: EMERGENCY MEDICINE

## 2024-12-18 PROCEDURE — 25000003 PHARM REV CODE 250: Performed by: EMERGENCY MEDICINE

## 2024-12-18 PROCEDURE — 85025 COMPLETE CBC W/AUTO DIFF WBC: CPT | Performed by: EMERGENCY MEDICINE

## 2024-12-18 PROCEDURE — 96374 THER/PROPH/DIAG INJ IV PUSH: CPT

## 2024-12-18 PROCEDURE — 80053 COMPREHEN METABOLIC PANEL: CPT | Performed by: EMERGENCY MEDICINE

## 2024-12-18 PROCEDURE — 80177 DRUG SCRN QUAN LEVETIRACETAM: CPT | Performed by: EMERGENCY MEDICINE

## 2024-12-18 RX ORDER — LORAZEPAM 2 MG/ML
1 INJECTION INTRAMUSCULAR
Status: COMPLETED | OUTPATIENT
Start: 2024-12-18 | End: 2024-12-18

## 2024-12-18 RX ORDER — LEVETIRACETAM 500 MG/1
500 TABLET ORAL 2 TIMES DAILY
Qty: 30 TABLET | Refills: 0 | Status: SHIPPED | OUTPATIENT
Start: 2024-12-18 | End: 2025-12-18

## 2024-12-18 RX ORDER — DIPHENHYDRAMINE HYDROCHLORIDE 50 MG/ML
12.5 INJECTION INTRAMUSCULAR; INTRAVENOUS
Status: COMPLETED | OUTPATIENT
Start: 2024-12-18 | End: 2024-12-18

## 2024-12-18 RX ORDER — ACETAMINOPHEN 500 MG
1000 TABLET ORAL
Status: COMPLETED | OUTPATIENT
Start: 2024-12-18 | End: 2024-12-18

## 2024-12-18 RX ORDER — MORPHINE SULFATE 4 MG/ML
4 INJECTION, SOLUTION INTRAMUSCULAR; INTRAVENOUS
Status: COMPLETED | OUTPATIENT
Start: 2024-12-18 | End: 2024-12-18

## 2024-12-18 RX ADMIN — LORAZEPAM 1 MG: 2 INJECTION INTRAMUSCULAR; INTRAVENOUS at 07:12

## 2024-12-18 RX ADMIN — MORPHINE SULFATE 4 MG: 4 INJECTION INTRAVENOUS at 10:12

## 2024-12-18 RX ADMIN — ACETAMINOPHEN 1000 MG: 500 TABLET ORAL at 10:12

## 2024-12-18 RX ADMIN — DIPHENHYDRAMINE HYDROCHLORIDE 12.5 MG: 50 INJECTION, SOLUTION INTRAMUSCULAR; INTRAVENOUS at 10:12

## 2024-12-18 NOTE — TELEPHONE ENCOUNTER
Called pt and advised that janis is out of office rn and there is not any other pa available to see her. I also advised if symptoms get more worse she should go to the er .     Trang Ley MA  [facility]  Neurosurgery.                ----- Message from Swati Lozano sent at 12/18/2024 12:39 PM CST -----  Regarding: Advise  Contact: [phone]  Type: Advice    Who Called: Patient    Would the patient rather a call back or a response via [facility]? Call Back    Best Call Back Number: [phone]    Additional Information: Patient is calling asking for a return call from office. Pt is stating that she is experiencing severe headaches that last all day and she gets them everyday.

## 2024-12-19 NOTE — ED PROVIDER NOTES
SCRIBE #1 NOTE: ILinda, am scribing for, and in the presence of, Otto Moreno MD. I have scribed the entire note.       History     Chief Complaint   Patient presents with    Headache     Patient presents to ED with c/o severe headache. Denies vomiting, but is nauseous. Patient is sensitive to light and noise. Denies neck pain.     Review of patient's allergies indicates:   Allergen Reactions    Codeine Itching    Hydromorphone Other (See Comments)     Can't wake up for long time if taken    Slow to wake up after surgery after receiving    Aleve [naproxen sodium]      Increases BP    Ibuprofen      Increases BP    Neuromuscular blockers, steroidal Hives     some    Pregabalin Itching    Latex, natural rubber Rash    Morphine Rash     itching    Norco [hydrocodone-acetaminophen] Itching, Rash and Hallucinations    Secobarbital sodium Rash     itching    Tylox [oxycodone-acetaminophen] Rash         History of Present Illness     HPI    12/18/2024, 6:30 PM  History obtained from the daughter and patient      History of Present Illness: Yolanda Betts is a 54 y.o. female patient with a PMHx of anemia, GERD, DM Type 2, HTN and seizures who presents to the Emergency Department for evaluation of a headache that has been present for several weeks since she was diagnosed with a SDH. Pt states the headache has worsened.  Associated sxs include photophobia. Over the last 2 months, she has been having intermittent muscular jerking. She is aware of when the jerking occurs, and has had EEG's in the past. She was prescribed Kepra after her last hospitalization, but had run out of it prior to Neurology f/u. Patient denies any and all other sxs at this time. No prior Tx. No further complaints or concerns at this time. Has neurology f/u in 1 week.       Arrival mode: Personal vehicle    PCP: Robe Britton MD        Past Medical History:  Past Medical History:   Diagnosis Date    Abnormal Pap smear of cervix      HPV genital warts    Anemia     Anxiety     Arthritis     Asthma 10/11/2016    Bipolar 1 disorder     Diabetes mellitus, type 2     Dyslipidemia associated with type 2 diabetes mellitus 2019    Dyspnea due to COVID-19 2024    General anesthetics causing adverse effect in therapeutic use     Genital warts     GERD (gastroesophageal reflux disease)     Herpes simplex virus (HSV) infection     Hyperlipidemia     Hypertension     Hypertension complicating diabetes 2019    Migraine with aura and without status migrainosus, not intractable 2016    Mild persistent asthma without complication 10/11/2016    Morbid obesity with body mass index (BMI) of 45.0 to 49.9 in adult 2017    Myoclonus     Obstructive sleep apnea     ALEN (obstructive sleep apnea)     2L per N/C q HS    Schizoaffective disorder, bipolar type 2019    Seasonal allergic rhinitis due to pollen 2019    Seizures     Type 2 diabetes mellitus with hyperglycemia, with long-term current use of insulin 2017    Type 2 diabetes mellitus with hyperglycemia, without long-term current use of insulin 2017       Past Surgical History:  Past Surgical History:   Procedure Laterality Date    abcess removed right back      ANGIOGRAM, CORONARY, WITH LEFT HEART CATHETERIZATION N/A 2024    Procedure: Angiogram, Coronary, with Left Heart Cath;  Surgeon: Jaimie Wills MD;  Location: [facility] CATH LAB;  Service: Cardiology;  Laterality: N/A;    BELT ABDOMINOPLASTY      BREAST SURGERY Bilateral     Reduction    CARPAL TUNNEL RELEASE Bilateral 2023    Procedure: RELEASE, CARPAL TUNNEL;  Surgeon: Neivlle Roth MD;  Location: [facility] OR;  Service: Orthopedics;  Laterality: Bilateral;  bilateral carpal tunnel release     SECTION      X 2    COLONOSCOPY      COLONOSCOPY N/A 2018    Procedure: colonoscopy for iron deficiency anemia;  Surgeon: Fraknie Sanchez MD;  Location: [facility] ENDO;   Service: Endoscopy;  Laterality: N/A;    COLONOSCOPY N/A 2/28/2018    Procedure: COLONOSCOPY;  Surgeon: Frankie Sanchez MD;  Location: [facility];  Service: Endoscopy;  Laterality: N/A;    COLONOSCOPY N/A 3/10/2023    Procedure: COLONOSCOPY;  Surgeon: Lalita Montes De Oca MD;  Location: [facility] ENDO;  Service: Endoscopy;  Laterality: N/A;    COLONOSCOPY N/A 4/4/2024    Procedure: COLONOSCOPY;  Surgeon: Tara Og MD;  Location: [facility] ENDO;  Service: Endoscopy;  Laterality: N/A;    EPIDURAL STEROID INJECTION INTO CERVICAL SPINE N/A 1/31/2023    Procedure: C6/7 IL ROCAEL RN IV Sedation;  Surgeon: Otto Phillips MD;  Location: [facility] PAIN MGT;  Service: Pain Management;  Laterality: N/A;    EPIDURAL STEROID INJECTION INTO CERVICAL SPINE N/A 1/30/2024    Procedure: C5/6 IL ROCAEL;  Surgeon: Otto Phillips MD;  Location: [facility] PAIN MGT;  Service: Pain Management;  Laterality: N/A;    ESOPHAGOGASTRODUODENOSCOPY N/A 3/10/2023    Procedure: EGD (ESOPHAGOGASTRODUODENOSCOPY);  Surgeon: Lalita Montes De Oca MD;  Location: [facility];  Service: Endoscopy;  Laterality: N/A;    HYSTERECTOMY      w/ BSO; hypermenorrhea    INJECTION OF ANESTHETIC AGENT AROUND MEDIAL BRANCH NERVES INNERVATING CERVICAL FACET JOINT Bilateral 12/19/2023    Procedure: Bilateral C5-7 MBB (diagnostic);  Surgeon: Otto Phillips MD;  Location: [facility] PAIN MGT;  Service: Pain Management;  Laterality: Bilateral;    MOUTH SURGERY  1996    OOPHORECTOMY      hyst/bso, hypermenorrhea    ROBOT-ASSISTED CHOLECYSTECTOMY USING DA DARI XI N/A 1/3/2020    Procedure: XI ROBOTIC CHOLECYSTECTOMY;  Surgeon: Damir Driscoll MD;  Location: [facility];  Service: General;  Laterality: N/A;    TOTAL REDUCTION MAMMOPLASTY      TUBAL LIGATION           Family History:  Family History   Problem Relation Name Age of Onset    Diabetes Mother      Hyperlipidemia Mother      Hypertension Mother      Asthma Mother      COPD Mother      Glaucoma Mother      Thyroid disease Mother       "Anesthesia problems Mother          "almost had a cardiac arrest" , blood clots    Hypertension Father      Hyperlipidemia Father      Cancer Father          Brain, lung, liver, kidney    No Known Problems Sister      Hypertension Brother      Alcohol abuse Brother      Heart disease Maternal Grandmother      Hyperlipidemia Maternal Grandmother      Hypertension Maternal Grandmother      Cataracts Maternal Grandmother      Diabetes Maternal Grandmother      Heart disease Maternal Grandfather      Hyperlipidemia Maternal Grandfather      Hypertension Maternal Grandfather      Glaucoma Maternal Grandfather      Cancer Maternal Grandfather      Cataracts Maternal Grandfather      Macular degeneration Maternal Grandfather      Diabetes Maternal Grandfather      Heart disease Paternal Grandmother      Hyperlipidemia Paternal Grandmother      Hypertension Paternal Grandmother      Cataracts Paternal Grandmother      Heart disease Paternal Grandfather      Hyperlipidemia Paternal Grandfather      Hypertension Paternal Grandfather      Cataracts Paternal Grandfather      Breast cancer Maternal Cousin      Breast cancer Maternal Cousin      Breast cancer Maternal Cousin      Breast cancer Maternal Cousin         Social History:  Social History     Tobacco Use    Smoking status: Never     Passive exposure: Never    Smokeless tobacco: Never   Substance and Sexual Activity    Alcohol use: Not Currently     Comment: socially  No alcohol 72h prior to sx    Drug use: No    Sexual activity: Yes     Partners: Male     Birth control/protection: Surgical     Comment: [facility]        Review of Systems     Review of Systems   Constitutional:  Negative for fever.   HENT:  Negative for sore throat.    Eyes:  Positive for photophobia.   Respiratory:  Negative for shortness of breath.    Cardiovascular:  Negative for chest pain.   Gastrointestinal:  Negative for nausea.   Genitourinary:  Negative for dysuria.   Musculoskeletal:  Negative for " "back pain.   Skin:  Negative for rash.   Neurological:  Positive for headaches. Negative for weakness.   Hematological:  Does not bruise/bleed easily.   All other systems reviewed and are negative.       Physical Exam     Initial Vitals [12/18/24 1730]   BP Pulse Resp Temp SpO2   (!) 181/97 78 16 98.9 °F (37.2 °C) 97 %      MAP       --          Physical Exam  Nursing Notes and Vital Signs Reviewed.  Constitutional: Patient is in mild distress.   Head: Atraumatic. Normocephalic.  Eyes: PERRL. EOM intact. Conjunctivae are not pale. No scleral icterus.  ENT: Mucous membranes are moist. Oropharynx is clear and symmetric.    Neck: Supple. Full ROM. No lymphadenopathy.  Cardiovascular: Regular rate. Regular rhythm. No murmurs, rubs, or gallops. Distal pulses are 2+ and symmetric.  Pulmonary/Chest: No respiratory distress. Clear to auscultation bilaterally. No wheezing or rales.  Abdominal: Soft and non-distended.  There is no tenderness.  No rebound, guarding, or rigidity.   Genitourinary: No CVA tenderness  Musculoskeletal: Moves all extremities. No obvious deformities. No edema.   Skin: Warm and dry.  Neurological:  Alert, awake, and appropriate.  Normal speech.  No acute focal neurological deficits are appreciated. Intermittent whole body tic where her muscles suddenly spasm and then release.  Psychiatric: Normal affect. Good eye contact. Appropriate in content.     ED Course   Procedures  ED Vital Signs:  Vitals:    12/18/24 1730 12/18/24 1954 12/18/24 2000 12/18/24 2015   BP: (!) 181/97  (!) 170/96 (!) 161/84   Pulse: 78  (!) 112 92   Resp: 16  (!) 68 (!) 39   Temp: 98.9 °F (37.2 °C)      TempSrc: Oral      SpO2: 97%  (!) 94% 98%   Weight:  94.5 kg (208 lb 6.4 oz)     Height: 4' 8" (1.422 m)       12/18/24 2030 12/18/24 2100 12/18/24 2130 12/18/24 2200   BP: (!) 166/90 (!) 141/74 (!) 165/64 (!) 154/72   Pulse: 93 95 91 86   Resp: (!) 22 (!) 32 (!) 52 (!) 37   Temp:       TempSrc:       SpO2: 97% 97% 97% 98% "   Weight:       Height:        12/18/24 2216   BP: (!) 140/63   Pulse: 92   Resp: 18   Temp:    TempSrc:    SpO2: 98%   Weight:    Height:        Abnormal Lab Results:  Labs Reviewed   CBC W/ AUTO DIFFERENTIAL - Abnormal       Result Value    WBC 8.94      RBC 4.16      Hemoglobin 9.4 (*)     Hematocrit 32.4 (*)     MCV 78 (*)     MCH 22.6 (*)     MCHC 29.0 (*)     RDW 15.7 (*)     Platelets 390      MPV 8.8 (*)     Immature Granulocytes 1.1 (*)     Gran # (ANC) 5.2      Immature Grans (Abs) 0.10 (*)     Lymph # 3.1      Mono # 0.5      Eos # 0.0      Baso # 0.01      nRBC 0      Gran % 57.9      Lymph % 35.0      Mono % 5.9      Eosinophil % 0.0      Basophil % 0.1      Differential Method Automated     COMPREHENSIVE METABOLIC PANEL - Abnormal    Sodium 136      Potassium 5.0      Chloride 101      CO2 24      Glucose 116 (*)     BUN 10      Creatinine 0.7      Calcium 9.9      Total Protein 7.1      Albumin 3.4 (*)     Total Bilirubin 0.2      Alkaline Phosphatase 89      AST 11      ALT 16      eGFR >60      Anion Gap 11     POCT GLUCOSE - Abnormal    POCT Glucose 138 (*)    MAGNESIUM    Magnesium 1.9     PHOSPHORUS    Phosphorus 3.9     TSH    TSH 1.888     LEVETIRACETAM  (KEPPRA) LEVEL        All Lab Results:  Results for orders placed or performed during the hospital encounter of 12/18/24   POCT glucose    Collection Time: 12/18/24  6:23 PM   Result Value Ref Range    POCT Glucose 138 (H) 70 - 110 mg/dL   CBC auto differential    Collection Time: 12/18/24  7:15 PM   Result Value Ref Range    WBC 8.94 3.90 - 12.70 K/uL    RBC 4.16 4.00 - 5.40 M/uL    Hemoglobin 9.4 (L) 12.0 - 16.0 g/dL    Hematocrit 32.4 (L) 37.0 - 48.5 %    MCV 78 (L) 82 - 98 fL    MCH 22.6 (L) 27.0 - 31.0 pg    MCHC 29.0 (L) 32.0 - 36.0 g/dL    RDW 15.7 (H) 11.5 - 14.5 %    Platelets 390 150 - 450 K/uL    MPV 8.8 (L) 9.2 - 12.9 fL    Immature Granulocytes 1.1 (H) 0.0 - 0.5 %    Gran # (ANC) 5.2 1.8 - 7.7 K/uL    Immature Grans (Abs) 0.10 (H)  0.00 - 0.04 K/uL    Lymph # 3.1 1.0 - 4.8 K/uL    Mono # 0.5 0.3 - 1.0 K/uL    Eos # 0.0 0.0 - 0.5 K/uL    Baso # 0.01 0.00 - 0.20 K/uL    nRBC 0 0 /100 WBC    Gran % 57.9 38.0 - 73.0 %    Lymph % 35.0 18.0 - 48.0 %    Mono % 5.9 4.0 - 15.0 %    Eosinophil % 0.0 0.0 - 8.0 %    Basophil % 0.1 0.0 - 1.9 %    Differential Method Automated    Comprehensive metabolic panel    Collection Time: 12/18/24  7:15 PM   Result Value Ref Range    Sodium 136 136 - 145 mmol/L    Potassium 5.0 3.5 - 5.1 mmol/L    Chloride 101 95 - 110 mmol/L    CO2 24 23 - 29 mmol/L    Glucose 116 (H) 70 - 110 mg/dL    BUN 10 6 - 20 mg/dL    Creatinine 0.7 0.5 - 1.4 mg/dL    Calcium 9.9 8.7 - 10.5 mg/dL    Total Protein 7.1 6.0 - 8.4 g/dL    Albumin 3.4 (L) 3.5 - 5.2 g/dL    Total Bilirubin 0.2 0.1 - 1.0 mg/dL    Alkaline Phosphatase 89 40 - 150 U/L    AST 11 10 - 40 U/L    ALT 16 10 - 44 U/L    eGFR >60 >60 mL/min/1.73 m^2    Anion Gap 11 8 - 16 mmol/L   Magnesium    Collection Time: 12/18/24  7:15 PM   Result Value Ref Range    Magnesium 1.9 1.6 - 2.6 mg/dL   Phosphorus    Collection Time: 12/18/24  7:15 PM   Result Value Ref Range    Phosphorus 3.9 2.7 - 4.5 mg/dL   TSH    Collection Time: 12/18/24  7:15 PM   Result Value Ref Range    TSH 1.888 0.400 - 4.000 uIU/mL     *Note: Due to a large number of results and/or encounters for the requested time period, some results have not been displayed. A complete set of results can be found in Results Review.        Imaging Results:  Imaging Results              CT Head Without Contrast (Final result)  Result time 12/18/24 20:10:44      Final result by Pelon Frye MD (12/18/24 20:10:44)                   Impression:      No acute intracranial CT abnormality.  Further evaluation as needed.    All CT scans at this facility are performed  using dose modulation techniques as appropriate to performed exam including the following:  automated exposure control; adjustment of mA and/or kV according to the  patients size (this includes techniques or standardized protocols for targeted exams where dose is matched to indication/reason for exam: i.e. extremities or head);  iterative reconstruction technique.      Electronically signed by: Pelon Frye  Date:    12/18/2024  Time:    20:10               Narrative:    EXAMINATION:  CT HEAD WITHOUT CONTRAST    CLINICAL HISTORY:  Seizure, new-onset, no history of trauma;    TECHNIQUE:  Low dose axial CT images obtained throughout the head without intravenous contrast. Sagittal and coronal reconstructions were performed.    COMPARISON:  Multiple    FINDINGS:  Intracranial compartment:    Ventricles and sulci are normal in size for age without evidence of hydrocephalus. No extra-axial blood or fluid collections.    The brain parenchyma appears normal. No parenchymal mass, hemorrhage, edema or major vascular distribution infarct.    Skull/extracranial contents (limited evaluation): No fracture. Mastoid air cells and paranasal sinuses are essentially clear.  Motion degrades the evaluation.                                     No EKG ordered.         The Emergency Provider reviewed the vital signs and test results, which are outlined above.     ED Discussion       ED Course as of 12/18/24 2303   Wed Dec 18, 2024   1858 CT scan from 2d ago reviewed.  [BA]   2137 DDx includes ICH, Increased ICP, Migraine, Headache otherwise [BA]      ED Course User Index  [BA] Otto Moreno MD     Medical Decision Making  Amount and/or Complexity of Data Reviewed  Labs: ordered. Decision-making details documented in ED Course.  Radiology: ordered. Decision-making details documented in ED Course.  Discussion of management or test interpretation with external provider(s): 10:11 PM: Reassessed pt at this time. Discussed with pt all pertinent ED information and results. Discussed pt dx and plan of tx. Gave pt all f/u and return to the ED instructions. All questions and concerns were addressed at this  time. Pt expresses understanding of information and instructions, and is comfortable with plan to discharge. Pt is stable for discharge.    I discussed with patient and/or family/caretaker that evaluation in the ED does not suggest any emergent or life threatening medical conditions requiring immediate intervention beyond what was provided in the ED, and I believe patient is safe for discharge.  Regardless, an unremarkable evaluation in the ED does not preclude the development or presence of a serious of life threatening condition. As such, patient was instructed to return immediately for any worsening or change in current symptoms.       Risk  OTC drugs.  Prescription drug management.                ED Medication(s):  Medications   LORazepam injection 1 mg (1 mg Intravenous Given 12/18/24 1953)   acetaminophen tablet 1,000 mg (1,000 mg Oral Given 12/18/24 2217)   morphine injection 4 mg (4 mg Intravenous Given 12/18/24 2216)   diphenhydrAMINE injection 12.5 mg (12.5 mg Intravenous Given 12/18/24 2216)       Discharge Medication List as of 12/18/2024  9:37 PM           Follow-up Information       Robe Britton MD. Schedule an appointment as soon as possible for a visit in 2 days.    Specialty: Family Medicine  Why: For re-evaluation and further treatment  Contact information:  [address] [facility] [address] [phone]               [facility]    Specialty: Emergency Medicine  Why: If symptoms worsen, For re-evaluation and further treatment, As needed  Contact information:  [address] [facility] [address] [phone]             Your Neurologist. Go on 12/26/2024.    Why: For re-evaluation and further treatment                               Scribe Attestation:   Scribe #1: I performed the above scribed service and the documentation accurately describes the services I performed. I attest to the accuracy of the note.     Attending:   Physician  Attestation Statement for Scribe #1: I, Otto Moreno MD, personally performed the services described in this documentation, as scribed by Linda Ferrara, in my presence, and it is both accurate and complete.           Clinical Impression       ICD-10-CM ICD-9-CM   1. Nonintractable headache, unspecified chronicity pattern, unspecified headache type  R51.9 784.0       Disposition:   Disposition: Discharged  Condition: Stable        Otto Moreno MD  12/19/24 0434

## 2024-12-20 ENCOUNTER — TELEPHONE (OUTPATIENT)
Dept: PRIMARY CARE CLINIC | Facility: CLINIC | Age: 54
End: 2024-12-20
Payer: MEDICAID

## 2024-12-20 ENCOUNTER — TELEPHONE (OUTPATIENT)
Dept: DIABETES | Facility: CLINIC | Age: 54
End: 2024-12-20
Payer: MEDICAID

## 2024-12-20 ENCOUNTER — PATIENT MESSAGE (OUTPATIENT)
Dept: DIABETES | Facility: CLINIC | Age: 54
End: 2024-12-20
Payer: MEDICAID

## 2024-12-20 NOTE — TELEPHONE ENCOUNTER
Returned call to advise patient to report to ER and present to Neurology appt scheduled on 12/26/24. She states she was just notifying provider that she had seizure. Patient voiced understanding.               ----- Message from Summer sent at 12/20/2024 11:08 AM CST -----  Contact: Yolanda  Patient called asking for advice about had a seizure on Wednesday 12/18. She wants to know what would you like for her to do. Please call patient at [phone]. Thanks!

## 2024-12-20 NOTE — TELEPHONE ENCOUNTER
Patient called she read the [employer] message at 1:42 I just called she voiced that she does not have the pump on currently encouraged to place pump and please follow Provider orders she voiced that her levels are understanding voiced

## 2024-12-20 NOTE — TELEPHONE ENCOUNTER
-patient called regarding report of having seizure advised she need to speak with Neuro voiced that she would patient was speaking sluggish reported blood glucose was at 45 mg /dl inquired if she was alone she voiced he nephew was there spoke with him to assist patient with Baqsimi nasal spray attempted to assess reading she reported sensor error do not attempt in 3 hours    Uploaded pump and CGM report     Ask to manually check blood glucose during call unable assess spoke with Provider advise to call 911 will report to [facility]     911 was activated stayed on call with patient during call to 911 while on call with 911 she reported to dispatcher 160 mg/DL     --- Message from Summer sent at 12/20/2024 11:11 AM CST -----  Contact: Yolanda  Patient called asking for advice about had a seizure on Wednesday 12/18. She wants to know what would you like for her to do. Please call patient at [phone]. Thanks!

## 2024-12-22 LAB — LEVETIRACETAM SERPL-MCNC: <1 UG/ML (ref 3–60)

## 2024-12-23 ENCOUNTER — PATIENT OUTREACH (OUTPATIENT)
Dept: ADMINISTRATIVE | Facility: OTHER | Age: 54
End: 2024-12-23
Payer: MEDICAID

## 2024-12-23 NOTE — PROGRESS NOTES
CHW - Follow Up    This LPN completed a follow up visit with patient via telephone today. 12/23/24  Pt/Caregiver reported: She has been in and out of hospital due to seizures. Need resources mailed again.  Community Health Worker / LPN provided: Will mail resources again on utility and rent assistance. Advised to complete paperwork for assistance once she is feeling better  Follow up required: Yes  Follow-up Outreach - Due: 1/6/2025

## 2024-12-24 ENCOUNTER — NURSE TRIAGE (OUTPATIENT)
Dept: ADMINISTRATIVE | Facility: CLINIC | Age: 54
End: 2024-12-24
Payer: MEDICAID

## 2024-12-24 NOTE — TELEPHONE ENCOUNTER
[place]    PCP:  Rose Khan, DNP    Pt reports 3 seizures (last was on Wednesday).  C/O nausea, light sensitive/sound, blurred vision, bilat eye pain, loss of words, and HA.  H/O Migraines.  Denies fever, stiff neck, sore throat, cold symptoms, SOB, and CP.  Per protocol, care advised is call  now.  Pt VU and refused care advised.  Care advice reinforced but she continues to refuse care advice and wants to speak with OCP.  NT contacted OCP, Dr. Cabrera.  OCP will contact pt directly with recommendations.  NT contacted pt to notify pt that OCP will contact her directly.  Advised to call for worsening/questions/concerns.  VU.    Reason for Disposition   [1] Loss of speech or garbled speech AND [2] new-onset    Additional Information   Negative: Difficult to awaken or acting confused (e.g., disoriented, slurred speech)   Negative: [1] Weakness of the face, arm or leg on one side of the body AND [2] new-onset   Negative: [1] Numbness of the face, arm or leg on one side of the body AND [2] new-onset    Protocols used: Headache-A-AH

## 2024-12-25 NOTE — TELEPHONE ENCOUNTER
Spoke to patient, states she's having worsening intractable right sided headache as well as higher order cognitive issues, is able to speak clearly and coherently during our conversation. Says her legs feel heavy but denies focal weakness or numbness. Had 3rd seizure Wednesday, is on keppra.    Reviewed 11/30 CT as well as 12/18 CT while on the phone, small R 3mm SDH present on 11/30 CT appears to have resolved on 12/18. Pt states was having HA still at time of 12/18 when SDH was resolved. She states her HA will not stop now but she does not wish to go to the ED now.     Discussed that while it is possible for a SDH to spontaneously enlarge and recur and contributing to her HA, in the absence of trauma this would be very unlikely given the apparent resolution on 12/18. Stated that she can keep her clinic follow up with LINO Lacey on Thursday but she likely needs a neurology referral for HA and seizure management. Stated no local neurology available to her until April, stated we could possibly try to get her a sooner appointment here. Given increased seizure frequency would also recommend MRI outpatient for her.     Roopa Cabrera MD  [facility] PGY3

## 2024-12-26 ENCOUNTER — TELEPHONE (OUTPATIENT)
Dept: NEUROSURGERY | Facility: CLINIC | Age: 54
End: 2024-12-26
Payer: MEDICAID

## 2024-12-26 ENCOUNTER — PATIENT MESSAGE (OUTPATIENT)
Dept: NEUROSURGERY | Facility: CLINIC | Age: 54
End: 2024-12-26

## 2024-12-26 ENCOUNTER — OFFICE VISIT (OUTPATIENT)
Dept: NEUROSURGERY | Facility: CLINIC | Age: 54
End: 2024-12-26
Payer: MEDICAID

## 2024-12-26 DIAGNOSIS — S06.5XAA SUBDURAL HEMATOMA: Primary | ICD-10-CM

## 2024-12-26 DIAGNOSIS — R51.9 ACUTE INTRACTABLE HEADACHE, UNSPECIFIED HEADACHE TYPE: Primary | ICD-10-CM

## 2024-12-26 PROCEDURE — 99999 PR PBB SHADOW E&M-EST. PATIENT-LVL III: CPT | Mod: PBBFAC,,, | Performed by: PHYSICIAN ASSISTANT

## 2024-12-26 PROCEDURE — 3066F NEPHROPATHY DOC TX: CPT | Mod: CPTII,,, | Performed by: PHYSICIAN ASSISTANT

## 2024-12-26 PROCEDURE — 4010F ACE/ARB THERAPY RXD/TAKEN: CPT | Mod: CPTII,,, | Performed by: PHYSICIAN ASSISTANT

## 2024-12-26 PROCEDURE — 3046F HEMOGLOBIN A1C LEVEL >9.0%: CPT | Mod: CPTII,,, | Performed by: PHYSICIAN ASSISTANT

## 2024-12-26 PROCEDURE — 1159F MED LIST DOCD IN RCRD: CPT | Mod: CPTII,,, | Performed by: PHYSICIAN ASSISTANT

## 2024-12-26 PROCEDURE — 99214 OFFICE O/P EST MOD 30 MIN: CPT | Mod: S$PBB,,, | Performed by: PHYSICIAN ASSISTANT

## 2024-12-26 PROCEDURE — 99213 OFFICE O/P EST LOW 20 MIN: CPT | Mod: PBBFAC | Performed by: PHYSICIAN ASSISTANT

## 2024-12-26 PROCEDURE — 3061F NEG MICROALBUMINURIA REV: CPT | Mod: CPTII,,, | Performed by: PHYSICIAN ASSISTANT

## 2024-12-26 RX ORDER — LORAZEPAM 1 MG/1
0.5 TABLET ORAL EVERY 6 HOURS PRN
Qty: 2 TABLET | Refills: 0 | Status: SHIPPED | OUTPATIENT
Start: 2024-12-26 | End: 2025-01-25

## 2024-12-26 NOTE — PROGRESS NOTES
"Neurosurgery  Established Patient    SUBJECTIVE:     History of Present Illness: Yolanda Betts is a 54 y.o. female who presents for follow up for subdural hematoma. She originally presented to the ED on 11/30 with reported full body tremors and headaches. She was found to have a thin acute appearing right sided subdural hematoma. She was admitted and SDH was stable on repeat imaging. She was monitored on EEG with no seizures identified. She was discharged home with NSGY and Neurology follow up.    Patient is here today and reports persistent headache for which she was recently seen in the ED on 12/18. CTH showed resolved SDH. Headache occurs on the right side of her head. They are daily and are associated with "tunnel vision". She has a history of migraines that stopped about  2 years ago, but these new headaches started about 3-4 months ago. She endorses memory issues as well. She states she has poor vision at baseline with cataracts and glaucoma for which she is pending surgery. The whole body tremors have occurred 3 times. When these episodes happen she is conscious and it is convulsions of her entire body, including her head. She has no prior history of seizure.     Review of patient's allergies indicates:   Allergen Reactions    Codeine Itching    Hydromorphone Other (See Comments)     Can't wake up for long time if taken    Slow to wake up after surgery after receiving    Aleve [naproxen sodium]      Increases BP    Ibuprofen      Increases BP    Neuromuscular blockers, steroidal Hives     some    Pregabalin Itching    Latex, natural rubber Rash    Morphine Rash     itching    Norco [hydrocodone-acetaminophen] Itching, Rash and Hallucinations    Secobarbital sodium Rash     itching    Tylox [oxycodone-acetaminophen] Rash       Current Outpatient Medications   Medication Sig Dispense Refill    albuterol (PROVENTIL) 2.5 mg /3 mL (0.083 %) nebulizer solution Take 2.5 mg by nebulization every 4 (four) hours " "as needed. Times a day 75 mL 1    albuterol (PROVENTIL/VENTOLIN HFA) 90 mcg/actuation inhaler Inhale 2 puffs into the lungs every 6 (six) hours as needed for Wheezing. 8.5 g 1    ALPRAZolam (XANAX) 1 MG tablet Take 1 tablet (1 mg total) by mouth 2 (two) times daily as needed for Anxiety. 60 tablet 2    amLODIPine (NORVASC) 5 MG tablet Take 1 tablet (5 mg total) by mouth once daily. 30 tablet 11    atorvastatin (LIPITOR) 20 MG tablet Take 1 tablet (20 mg total) by mouth every evening 90 tablet 3    BD INSULIN SYRINGE ULTRA-FINE 1/2 mL 30 gauge x 1/2" Syrg   0    blood-glucose meter,continuous (DEXCOM G7 ) Misc 1 each by Misc.(Non-Drug; Combo Route) route once. for 1 dose 1 each 0    blood-glucose sensor (DEXCOM G6 SENSOR) Mariela change sensor every 10 days. 3 each 11    blood-glucose sensor (DEXCOM G7 SENSOR) Mariela Use to check bg. Change every 10 days 3 each 11    blood-glucose transmitter (DEXCOM G6 TRANSMITTER) Mariela 1 each by Misc.(Non-Drug; Combo Route) route every 3 (three) months. 1 each 3    blood-glucose transmitter (DEXCOM G6 TRANSMITTER) Mariela Use as directed change every 3 months 1 each 3    cevimeline (EVOXAC) 30 mg capsule Take 1 capsule by mouth in the morning and 1 capsule at noon and 1 capsule before bedtime. Do all this for 30 days. 90 capsule 0    cholecalciferol, vitamin D3, 1,250 mcg (50,000 unit) Tab Take 1 capsule by mouth every 7 days. 12 tablet 3    cyclobenzaprine (FLEXERIL) 10 MG tablet Take 1 tablet (10 mg total) by mouth 3 (three) times daily as needed (Pain). 90 tablet 11    DULoxetine (CYMBALTA) 60 MG capsule Take 1 capsule by mouth 2 times daily 180 capsule 3    estradioL (ESTRACE) 0.01 % (0.1 mg/gram) vaginal cream Place 1 g vaginally twice a week. 42.5 g 3    fenofibrate (TRICOR) 145 MG tablet Take 1 tablet by mouth in the morning. 30 tablet 5    fluticasone propionate (FLONASE) 50 mcg/actuation nasal spray Take 1 spray by Each Nostril route in the morning. 16 g 3    furosemide " "(LASIX) 20 MG tablet Take 1 tablet (20 mg total) by mouth once daily. 90 tablet 3    glucagon (BAQSIMI) 3 mg/actuation Spry 1 spray by Nasal route as needed (hypoglycemia). For emergency use. May repeat 1 time after 15 minutes 2 each 1    glucagon (BAQSIMI) 3 mg/actuation [place] SPRAY 1 SPRAY IN NOSTRIL AS NEEDED FOR EMERGENCY. MAY REPEAT 1 TIME AFTER 15 MINUTES 2 each 1    insulin aspart U-100 (NOVOLOG FLEXPEN U-100 INSULIN) 100 unit/mL (3 mL) InPn pen Inject up to 10 units under the skin per sliding scale 3 times a day before meals 15 mL 3    insulin aspart U-100 (NOVOLOG U-100 INSULIN ASPART) 100 unit/mL injection To use via insulin pump. Up to 200 units every 3 days 20 mL 5    insulin aspart U-100 (NOVOLOG) 100 unit/mL injection NovoLOG FlexPen      insulin glargine U-100, Lantus, (LANTUS SOLOSTAR U-100 INSULIN) 100 unit/mL (3 mL) InPn pen Inject up to 50 units daily in event of pump failure 15 mL 3    insulin pump cart,auto,BT,G6/7 (OMNIPOD 5 G6-G7 PODS, GEN 5,) Crtg 1 each by misc route every 48 hours 180 each 0    insulin pump cart,automated,BT (OMNIPOD 5 G6 PODS, GEN 5,) Crtg 1 each by Misc.(Non-Drug; Combo Route) route every 48 hours. 45 each 3    insulin syringe-needle U-100 0.3 mL 30 gauge x 5/16" Syrg 1 each by Misc.(Non-Drug; Combo Route) route Every 3 (three) days. 100 each 2    lamoTRIgine (LAMICTAL) 100 MG tablet Take 1 tablet (100 mg total) by mouth every morning. 30 tablet 2    levETIRAcetam (KEPPRA) 500 MG Tab Take 1 tablet (500 mg total) by mouth 2 (two) times daily. 30 tablet 0    levocetirizine (XYZAL) 5 MG tablet Take 1 tablet (5 mg total) by mouth every evening. 30 tablet 11    LIDOcaine (LIDODERM) 5 % Place 2 patches onto the skin every 12 (twelve) hours as needed (pain). Remove & Discard patch within 12 hours or as directed by MD Albright patch 11    lifitegrast (XIIDRA) 5 % Dpet Place 1 drop into both eyes 2 (two) times daily. 60 each 12    LORazepam (ATIVAN) 1 MG tablet Take 0.5 tablets (0.5 mg " "total) by mouth every 6 (six) hours as needed for anxiety 2 tablet 0    magnesium hydroxide 400 mg/5 ml (MILK OF MAGNESIA) 400 mg/5 mL Susp Take 5 mLs by mouth every 4 (four) hours as needed.      magnesium oxide (MAG-OX) 400 mg (241.3 mg magnesium) tablet Take 1 tablet (400 mg total) by mouth once daily. 90 tablet 3    metoprolol succinate (TOPROL-XL) 50 MG 24 hr tablet Take 1 tablet (50 mg total) by mouth every morning. 30 tablet 11    nebulizer and compressor (COMP-AIR NEBULIZER COMPRESSOR) Mariela use as directed 1 each 0    omeprazole (PRILOSEC) 40 MG capsule Take 1 capsule (40 mg total) by mouth once daily. 90 capsule 1    OZEMPIC 2 mg/dose (8 mg/3 mL) PnIj Inject 2 mg into the skin every 7 days.      pen needle, diabetic (BD ULTRA-FINE MINI PEN NEEDLE) 31 gauge x 3/16" Ndle Use 1 a day in event of pump failure 100 each 11    polyethylene glycol (GLYCOLAX) 17 gram/dose powder Take 17 g by mouth 2 (two) times daily. 1020 g 11    promethazine (PHENERGAN) 25 MG tablet Take 25 mg by mouth every 6 (six) hours as needed.      secukinumab (COSENTYX PEN, 2 PENS,) 150 mg/mL PnIj Inject 300 mg into the skin every 14 (fourteen) days. 4 mL 11    semaglutide (OZEMPIC) 2 mg/dose (8 mg/3 mL) PnIj Inject 2 mg into the skin every 7 days. 3 mL 2    semaglutide (OZEMPIC) 2 mg/dose (8 mg/3 mL) PnIj Inject 2 mg under the skin every 7 days. 3 mL 2    spironolactone (ALDACTONE) 25 MG tablet Take 1 tablet by mouth in the morning. 90 tablet 1    SYMBICORT 160-4.5 mcg/actuation HFAA Inhale 2 puffs into the lungs in the morning and 2 puffs before bedtime. 2 puffs every 12 hours 10.2 g 2    triamcinolone acetonide 0.1% (KENALOG) 0.1 % paste Apply coating 2 times daily 5 g 12    zolpidem (AMBIEN) 5 MG Tab Take 1 tablet (5 mg total) by mouth nightly as needed (anxiety). 30 tablet 2     No current facility-administered medications for this visit.       Past Medical History:   Diagnosis Date    Abnormal Pap smear of cervix     HPV genital warts "    Anemia     Anxiety     Arthritis     Asthma 10/11/2016    Bipolar 1 disorder     Diabetes mellitus, type 2     Dyslipidemia associated with type 2 diabetes mellitus 2019    Dyspnea due to COVID-19 2024    General anesthetics causing adverse effect in therapeutic use     Genital warts     GERD (gastroesophageal reflux disease)     Herpes simplex virus (HSV) infection     Hyperlipidemia     Hypertension     Hypertension complicating diabetes 2019    Migraine with aura and without status migrainosus, not intractable 2016    Mild persistent asthma without complication 10/11/2016    Morbid obesity with body mass index (BMI) of 45.0 to 49.9 in adult 2017    Myoclonus     Obstructive sleep apnea     ALEN (obstructive sleep apnea)     2L per N/C q HS    Schizoaffective disorder, bipolar type 2019    Seasonal allergic rhinitis due to pollen 2019    Seizures     Type 2 diabetes mellitus with hyperglycemia, with long-term current use of insulin 2017    Type 2 diabetes mellitus with hyperglycemia, without long-term current use of insulin 2017     Past Surgical History:   Procedure Laterality Date    abcess removed right back      ANGIOGRAM, CORONARY, WITH LEFT HEART CATHETERIZATION N/A 2024    Procedure: Angiogram, Coronary, with Left Heart Cath;  Surgeon: Jaimie Wills MD;  Location: [facility] CATH LAB;  Service: Cardiology;  Laterality: N/A;    BELT ABDOMINOPLASTY      BREAST SURGERY Bilateral     Reduction    CARPAL TUNNEL RELEASE Bilateral 2023    Procedure: RELEASE, CARPAL TUNNEL;  Surgeon: Neville Roth MD;  Location: [facility] OR;  Service: Orthopedics;  Laterality: Bilateral;  bilateral carpal tunnel release     SECTION      X 2    COLONOSCOPY      COLONOSCOPY N/A 2018    Procedure: colonoscopy for iron deficiency anemia;  Surgeon: Frankie Sanchez MD;  Location: [facility] ENDO;  Service: Endoscopy;  Laterality: N/A;     COLONOSCOPY N/A 2/28/2018    Procedure: COLONOSCOPY;  Surgeon: Frankie Sanchez MD;  Location: [facility] ENDO;  Service: Endoscopy;  Laterality: N/A;    COLONOSCOPY N/A 3/10/2023    Procedure: COLONOSCOPY;  Surgeon: Lalita Montes De Oca MD;  Location: [facility] ENDO;  Service: Endoscopy;  Laterality: N/A;    COLONOSCOPY N/A 4/4/2024    Procedure: COLONOSCOPY;  Surgeon: Tara Og MD;  Location: [facility] ENDO;  Service: Endoscopy;  Laterality: N/A;    EPIDURAL STEROID INJECTION INTO CERVICAL SPINE N/A 1/31/2023    Procedure: C6/7 IL ROCAEL RN IV Sedation;  Surgeon: Otto Phillips MD;  Location: [facility] PAIN MGT;  Service: Pain Management;  Laterality: N/A;    EPIDURAL STEROID INJECTION INTO CERVICAL SPINE N/A 1/30/2024    Procedure: C5/6 IL ROCAEL;  Surgeon: Otto Phillips MD;  Location: [facility] PAIN MGT;  Service: Pain Management;  Laterality: N/A;    ESOPHAGOGASTRODUODENOSCOPY N/A 3/10/2023    Procedure: EGD (ESOPHAGOGASTRODUODENOSCOPY);  Surgeon: Lalita Montes De Oca MD;  Location: [facility] ENDO;  Service: Endoscopy;  Laterality: N/A;    HYSTERECTOMY      w/ BSO; hypermenorrhea    INJECTION OF ANESTHETIC AGENT AROUND MEDIAL BRANCH NERVES INNERVATING CERVICAL FACET JOINT Bilateral 12/19/2023    Procedure: Bilateral C5-7 MBB (diagnostic);  Surgeon: Otto Phillips MD;  [facility];  Service: Pain Management;  Laterality: Bilateral;    MOUTH SURGERY  1996    OOPHORECTOMY      hyst/bso, hypermenorrhea    ROBOT-ASSISTED CHOLECYSTECTOMY USING DA DARI XI N/A 1/3/2020    Procedure: XI ROBOTIC CHOLECYSTECTOMY;  Surgeon: Damir Driscoll MD;  Location: [facility] OR;  Service: General;  Laterality: N/A;    TOTAL REDUCTION MAMMOPLASTY      TUBAL LIGATION       Family History       Problem Relation (Age of Onset)    Alcohol abuse Brother    Anesthesia problems Mother    Asthma Mother    Breast cancer Maternal Cousin, Maternal Cousin, Maternal Cousin, Maternal Cousin    COPD Mother    Cancer Father, Maternal Grandfather    Cataracts  Maternal Grandmother, Maternal Grandfather, Paternal Grandmother, Paternal Grandfather    Diabetes Mother, Maternal Grandmother, Maternal Grandfather    Glaucoma Mother, Maternal Grandfather    Heart disease Maternal Grandmother, Maternal Grandfather, Paternal Grandmother, Paternal Grandfather    Hyperlipidemia Mother, Father, Maternal Grandmother, Maternal Grandfather, Paternal Grandmother, Paternal Grandfather    Hypertension Mother, Father, Brother, Maternal Grandmother, Maternal Grandfather, Paternal Grandmother, Paternal Grandfather    Macular degeneration Maternal Grandfather    No Known Problems Sister    Thyroid disease Mother          Social History     Socioeconomic History    Marital status: Single   Tobacco Use    Smoking status: Never     Passive exposure: Never    Smokeless tobacco: Never   Substance and Sexual Activity    Alcohol use: Not Currently     Comment: socially  No alcohol 72h prior to sx    Drug use: No    Sexual activity: Yes     Partners: Male     Birth control/protection: Surgical     Comment: hyst   Social History Narrative    Long-term care nurse     Social Drivers of Health     Financial Resource Strain: Medium Risk (11/30/2024)    Overall Financial Resource Strain (CARDIA)     Difficulty of Paying Living Expenses: Somewhat hard   Food Insecurity: Food Insecurity Present (11/30/2024)    Hunger Vital Sign     Worried About Running Out of Food in the Last Year: Often true     Ran Out of Food in the Last Year: Often true   Transportation Needs: No Transportation Needs (11/30/2024)    TRANSPORTATION NEEDS     Transportation : No   Physical Activity: Inactive (10/15/2024)    Exercise Vital Sign     Days of Exercise per Week: 0 days     Minutes of Exercise per Session: 0 min   Stress: Stress Concern Present (11/30/2024)    [employer] - Occupational Stress Questionnaire     Feeling of Stress : Very much   Housing Stability: High Risk (11/30/2024)    Housing  Stability Vital Sign     Unable to Pay for Housing in the Last Year: Yes     Homeless in the Last Year: No       Review of Systems    OBJECTIVE:     Vital Signs  Pain Score: 10-Worst pain ever  There is no height or weight on file to calculate BMI.    Neurosurgery Physical Exam  General: well developed, well nourished, no distress.   Head: normocephalic, atraumatic  Neurologic: Alert and oriented. Thought content appropriate.  GCS: Motor: 6/Verbal: 5/Eyes: 4 GCS Total: 15  Mental Status: Awake, Alert, Oriented x 4  Language: No aphasia  Speech: No dysarthria  Cranial nerves: face symmetric, tongue midline, CN II-XII grossly intact.   Eyes: pupils equal, round, reactive to light with accommodation, EOMI.   Pulmonary: normal respirations, no signs of respiratory distress  Abdomen: soft, non-distended, not tender to palpation  Skin: Skin is warm, dry and intact.  Sensory: intact to light touch throughout    Motor Strength:Moves all extremities spontaneously with good tone.  Full strength upper and lower extremities. No abnormal movements seen.       Cerebellar:   Finger-to-nose: bilateral dysmetria   Pronator drift: absent bilaterally    Diagnostic Results:  CTH from 12/18 shows full resolution of prior SDH    ASSESSMENT/PLAN:     Yolanda Betts is a 54 y.o. female with history of SDH that has since resolved. She complains of persistent headaches and 3 episodes of whole body convulsions. She is scheduled to see Neurology in April, we will reach out to their staff to see if their are any sooner appts available. I would like to get an MRI brain given her new persistent headaches and possible seizure episodes. Will follow up with her virtually. She knows she can contact the clinic in the meantime with any questions or concerns.

## 2024-12-27 ENCOUNTER — TELEPHONE (OUTPATIENT)
Dept: PRIMARY CARE CLINIC | Facility: CLINIC | Age: 54
End: 2024-12-27
Payer: MEDICAID

## 2024-12-27 NOTE — TELEPHONE ENCOUNTER
Returned call to pt in regards to migraines and headache, pt has an upcoming appointment for Mri in regards to symptoms 01/09/2025. Pt advised to follow up with scheduled appointment. Pt is requesting migraine medication. Message will be sent over to the provider. Please allow time for the provider to respond. Pt voiced understanding.     ----- Message from Abby sent at 12/27/2024  8:17 AM CST -----  Contact: [phone]  [email or web address]     Patient is calling for Medical Advice regarding: Pt said she needs a call back     How long has patient had these symptoms:    Pharmacy name and phone#:    Patient wants a call back or thru [facility]: call back     Comments:    Please advise patient replies from provider may take up to 48 hours.

## 2024-12-30 NOTE — TELEPHONE ENCOUNTER
Spoke with pt and informed to pt that I will route message to the provider that is present in clinic today.     ----- Message from Baudilio sent at 12/30/2024  2:26 PM CST -----  Regarding: Patient Returning Call for Jayashree  Contact: Pt [phone]  Type: Returning a call    Who left a message? Jayashree Jose,    When did the practice call? Friday    Does patient know what this is regarding: Medication; Patient had a seizure and was sent to the ER    Would the patient rather a call back or a response via [facility]? Call    Comments:

## 2025-01-01 ENCOUNTER — NURSE TRIAGE (OUTPATIENT)
Dept: ADMINISTRATIVE | Facility: CLINIC | Age: 55
End: 2025-01-01
Payer: MEDICAID

## 2025-01-01 ENCOUNTER — OCHSNER VIRTUAL EMERGENCY DEPARTMENT (OUTPATIENT)
Facility: CLINIC | Age: 55
End: 2025-01-01

## 2025-01-01 DIAGNOSIS — R11.2 NAUSEA AND VOMITING IN ADULT: Primary | ICD-10-CM

## 2025-01-01 RX ORDER — ONDANSETRON 4 MG/1
4 TABLET, ORALLY DISINTEGRATING ORAL EVERY 8 HOURS PRN
Qty: 12 TABLET | Refills: 0 | Status: SHIPPED | OUTPATIENT
Start: 2025-01-01

## 2025-01-01 NOTE — TELEPHONE ENCOUNTER
"Yolanda c/o "nausea, diarrhea, unable to eat, feels overall bad, sinus draining down back of my throat." +dry cough. Symptoms began 5 nights ago, not improving. Denies abdominal pain & fever. Feels sick & weak. Per triage protocol, go to ED/UC now (or PCP triage). Pt states "I am not going anywhere, I just need phenergan or something for the nausea and an appt for tomorrow). Advised pt per Dr. Chandu Billings, [facility] OCP recommends e-visit for Rx. If not wanting to do e-visit, pt can an f/u w/ pcp tomorrow or go to UC today. Pt ok with e-visit. NT unable to place e-visit order. [facility] aware. Pt agrees to do VV. VV appt scheduled for 1340 today by [facility]ia. Instructed to call  back if further questions/concerns. Yolanda v/u.   Reason for Disposition   Patient sounds very sick or weak to the triager    Additional Information   Negative: Shock suspected (e.g., cold/pale/clammy skin, too weak to stand, low BP, rapid pulse)   Negative: Difficult to awaken or acting confused (e.g., disoriented, slurred speech)   Negative: Sounds like a life-threatening emergency to the triager   Negative: Vomiting also present and worse than the diarrhea   Negative: [1] Blood in stool AND [2] without diarrhea   Negative: Diarrhea in a cancer patient who is currently (or recently) receiving chemotherapy or radiation therapy, or cancer patient who has metastatic or end-stage cancer and is receiving palliative care   Negative: Diarrhea begins while taking an antibiotic by mouth (oral antibiotic)   Negative: [1] SEVERE abdominal pain (e.g., excruciating) AND [2] present > 1 hour   Negative: [1] SEVERE abdominal pain AND [2] age > 60 years   Negative: [1] Blood in the stool AND [2] moderate or large amount of blood   Negative: Black or tarry bowel movements  (Exception: Chronic-unchanged black-grey BMs AND is taking iron pills or Pepto-Bismol.)   Negative: [1] Drinking very little AND [2] dehydration suspected (e.g., no urine > 12 hours, very dry mouth, very " lightheaded)    Protocols used: Diarrhea-A-AH

## 2025-01-03 ENCOUNTER — OFFICE VISIT (OUTPATIENT)
Dept: PRIMARY CARE CLINIC | Facility: CLINIC | Age: 55
End: 2025-01-03
Payer: MEDICAID

## 2025-01-03 ENCOUNTER — PATIENT MESSAGE (OUTPATIENT)
Dept: PRIMARY CARE CLINIC | Facility: CLINIC | Age: 55
End: 2025-01-03

## 2025-01-03 ENCOUNTER — LAB VISIT (OUTPATIENT)
Dept: LAB | Facility: HOSPITAL | Age: 55
End: 2025-01-03
Attending: NURSE PRACTITIONER
Payer: MEDICAID

## 2025-01-03 VITALS
TEMPERATURE: 99 F | HEART RATE: 89 BPM | HEIGHT: 56 IN | DIASTOLIC BLOOD PRESSURE: 70 MMHG | OXYGEN SATURATION: 98 % | SYSTOLIC BLOOD PRESSURE: 112 MMHG | BODY MASS INDEX: 45.21 KG/M2 | WEIGHT: 201 LBS

## 2025-01-03 DIAGNOSIS — G40.409 MYOCLONIC SEIZURE: ICD-10-CM

## 2025-01-03 DIAGNOSIS — R11.0 NAUSEA: ICD-10-CM

## 2025-01-03 DIAGNOSIS — E78.5 DYSLIPIDEMIA ASSOCIATED WITH TYPE 2 DIABETES MELLITUS: ICD-10-CM

## 2025-01-03 DIAGNOSIS — E11.69 DYSLIPIDEMIA ASSOCIATED WITH TYPE 2 DIABETES MELLITUS: ICD-10-CM

## 2025-01-03 DIAGNOSIS — E11.65 TYPE 2 DIABETES MELLITUS WITH HYPERGLYCEMIA, WITH LONG-TERM CURRENT USE OF INSULIN: ICD-10-CM

## 2025-01-03 DIAGNOSIS — K52.9 GASTROENTERITIS: Primary | ICD-10-CM

## 2025-01-03 DIAGNOSIS — E55.9 VITAMIN D DEFICIENCY: ICD-10-CM

## 2025-01-03 DIAGNOSIS — Z79.4 TYPE 2 DIABETES MELLITUS WITH HYPERGLYCEMIA, WITH LONG-TERM CURRENT USE OF INSULIN: ICD-10-CM

## 2025-01-03 PROCEDURE — 3008F BODY MASS INDEX DOCD: CPT | Mod: CPTII,,, | Performed by: NURSE PRACTITIONER

## 2025-01-03 PROCEDURE — 80177 DRUG SCRN QUAN LEVETIRACETAM: CPT | Performed by: NURSE PRACTITIONER

## 2025-01-03 PROCEDURE — 1159F MED LIST DOCD IN RCRD: CPT | Mod: CPTII,,, | Performed by: NURSE PRACTITIONER

## 2025-01-03 PROCEDURE — 1160F RVW MEDS BY RX/DR IN RCRD: CPT | Mod: CPTII,,, | Performed by: NURSE PRACTITIONER

## 2025-01-03 PROCEDURE — 36415 COLL VENOUS BLD VENIPUNCTURE: CPT | Mod: PN | Performed by: NURSE PRACTITIONER

## 2025-01-03 PROCEDURE — 99215 OFFICE O/P EST HI 40 MIN: CPT | Mod: PBBFAC,PN | Performed by: NURSE PRACTITIONER

## 2025-01-03 PROCEDURE — 99214 OFFICE O/P EST MOD 30 MIN: CPT | Mod: S$PBB,,, | Performed by: NURSE PRACTITIONER

## 2025-01-03 PROCEDURE — 99999 PR PBB SHADOW E&M-EST. PATIENT-LVL V: CPT | Mod: PBBFAC,,, | Performed by: NURSE PRACTITIONER

## 2025-01-03 PROCEDURE — 3074F SYST BP LT 130 MM HG: CPT | Mod: CPTII,,, | Performed by: NURSE PRACTITIONER

## 2025-01-03 PROCEDURE — 3078F DIAST BP <80 MM HG: CPT | Mod: CPTII,,, | Performed by: NURSE PRACTITIONER

## 2025-01-03 PROCEDURE — 3072F LOW RISK FOR RETINOPATHY: CPT | Mod: CPTII,,, | Performed by: NURSE PRACTITIONER

## 2025-01-03 RX ORDER — SEMAGLUTIDE 2.68 MG/ML
INJECTION, SOLUTION SUBCUTANEOUS
Qty: 3 ML | Refills: 1 | Status: SHIPPED | OUTPATIENT
Start: 2025-01-03

## 2025-01-03 RX ORDER — SEMAGLUTIDE 2.68 MG/ML
2 INJECTION, SOLUTION SUBCUTANEOUS
Qty: 3 ML | Refills: 2 | Status: SHIPPED | OUTPATIENT
Start: 2025-01-03

## 2025-01-03 RX ORDER — LEVETIRACETAM 500 MG/1
500 TABLET ORAL 2 TIMES DAILY
Qty: 60 TABLET | Refills: 11 | Status: SHIPPED | OUTPATIENT
Start: 2025-01-03 | End: 2025-01-28

## 2025-01-03 RX ORDER — ONDANSETRON HYDROCHLORIDE 8 MG/1
8 TABLET, FILM COATED ORAL 2 TIMES DAILY
Qty: 20 TABLET | Refills: 0 | Status: SHIPPED | OUTPATIENT
Start: 2025-01-03 | End: 2025-01-18

## 2025-01-03 RX ORDER — ERGOCALCIFEROL 1.25 MG/1
50000 CAPSULE ORAL
Qty: 4 CAPSULE | Refills: 6 | Status: SHIPPED | OUTPATIENT
Start: 2025-01-03 | End: 2025-03-05

## 2025-01-03 RX ORDER — ATORVASTATIN CALCIUM 20 MG/1
20 TABLET, FILM COATED ORAL NIGHTLY
Qty: 90 TABLET | Refills: 3 | Status: SHIPPED | OUTPATIENT
Start: 2025-01-03 | End: 2026-01-03

## 2025-01-03 NOTE — PROGRESS NOTES
Subjective:       Patient ID: Yolanda Betts is a 54 y.o. female.    Chief Complaint: Follow-up, Nausea, Sinus Problem, Nasal Congestion, Ear Fullness, and Diarrhea      History of Present Illness:   Yolanda Betts 54 y.o. female presents today with for the following complaints   History of Present Illness    CHIEF COMPLAINT:  Ms. Betts presents to the clinic for medication management for seizures.    SEIZURES AND MEDICATIONS:  She continues Keppra 500 mg twice daily for seizure management and Vitamin D 50,000 units weekly for vitamin D deficiency.  She recently started new onset seizures about 2 months ago and was seen in ER and started on Keppra. Will place referral to [facility].     GASTROINTESTINAL:  She reports experiencing nausea, vomiting, and diarrhea for 3 days.      ROS:  General: -fever, -chills, -fatigue, -weight gain, -weight loss, -loss of appetite  Eyes: -vision changes, -blurry vision, -eye pain, -eye discharge  ENT: -ear pain, -hearing loss, -tinnitus, -nasal congestion, -sore throat  Cardiovascular: -chest pain, -palpitations, -lower extremity edema  Respiratory: -cough, -shortness of breath, -wheezing, -sputum production  Endocrine: -polyuria, -polydipsia, -heat intolerance, -cold intolerance  Gastrointestinal: -abdominal pain, -heartburn, +nausea, +vomiting, +diarrhea, -constipation, -blood in stool  Genitourinary: -dysuria, -urgency, -frequency, -hematuria, -nocturia, -incontinence  Heme & Lymphatic: -easy or excessive bleeding, -easy bruising, -swollen lymph nodes  Musculoskeletal: -muscle pain, -back pain, -joint pain, -joint swelling  Skin: -rash, -lesion, -itching, -skin texture changes, -skin color changes  Neurological: -headache, -dizziness, -numbness, -tingling, -+seizure activity, -speech difficulty, +memory loss, +confusion  Psychiatric: -anxiety, -depression, -sleep difficulty        Past Medical History:   Diagnosis Date    Abnormal Pap smear of cervix     HPV  "genital warts    Anemia     Anxiety     Arthritis     Asthma 10/11/2016    Bipolar 1 disorder     Diabetes mellitus, type 2     Dyslipidemia associated with type 2 diabetes mellitus 05/13/2019    Dyspnea due to COVID-19 09/12/2024    General anesthetics causing adverse effect in therapeutic use     Genital warts     GERD (gastroesophageal reflux disease)     Herpes simplex virus (HSV) infection     Hyperlipidemia     Hypertension     Hypertension complicating diabetes 05/05/2019    Migraine with aura and without status migrainosus, not intractable 03/21/2016    Mild persistent asthma without complication 10/11/2016    Morbid obesity with body mass index (BMI) of 45.0 to 49.9 in adult 08/03/2017    Myoclonus     Obstructive sleep apnea     ALEN (obstructive sleep apnea)     2L per N/C q HS    Schizoaffective disorder, bipolar type 04/25/2019    Seasonal allergic rhinitis due to pollen 05/13/2019    Seizures     Type 2 diabetes mellitus with hyperglycemia, with long-term current use of insulin 12/21/2017    Type 2 diabetes mellitus with hyperglycemia, without long-term current use of insulin 12/21/2017     Family History   Problem Relation Name Age of Onset    Diabetes Mother      Hyperlipidemia Mother      Hypertension Mother      Asthma Mother      COPD Mother      Glaucoma Mother      Thyroid disease Mother      Anesthesia problems Mother          "almost had a cardiac arrest" , blood clots    Hypertension Father      Hyperlipidemia Father      Cancer Father          Brain, lung, liver, kidney    No Known Problems Sister      Hypertension Brother      Alcohol abuse Brother      Heart disease Maternal Grandmother      Hyperlipidemia Maternal Grandmother      Hypertension Maternal Grandmother      Cataracts Maternal Grandmother      Diabetes Maternal Grandmother      Heart disease Maternal Grandfather      Hyperlipidemia Maternal Grandfather      Hypertension Maternal Grandfather      Glaucoma Maternal Grandfather   "    Cancer Maternal Grandfather      Cataracts Maternal Grandfather      Macular degeneration Maternal Grandfather      Diabetes Maternal Grandfather      Heart disease Paternal Grandmother      Hyperlipidemia Paternal Grandmother      Hypertension Paternal Grandmother      Cataracts Paternal Grandmother      Heart disease Paternal Grandfather      Hyperlipidemia Paternal Grandfather      Hypertension Paternal Grandfather      Cataracts Paternal Grandfather      Breast cancer Maternal Cousin      Breast cancer Maternal Cousin      Breast cancer Maternal Cousin      Breast cancer Maternal Cousin       Social History     Socioeconomic History    Marital status: Single   Tobacco Use    Smoking status: Never     Passive exposure: Never    Smokeless tobacco: Never   Substance and Sexual Activity    Alcohol use: Not Currently     Comment: socially  No alcohol 72h prior to sx    Drug use: No    Sexual activity: Yes     Partners: Male     Birth control/protection: Surgical     Comment: hyst   Social History Narrative    Long-term care nurse     Social Drivers of Health     Financial Resource Strain: Medium Risk (11/30/2024)    Overall Financial Resource Strain (CARDIA)     Difficulty of Paying Living Expenses: Somewhat hard   Food Insecurity: Food Insecurity Present (11/30/2024)    Hunger Vital Sign     Worried About Running Out of Food in the Last Year: Often true     Ran Out of Food in the Last Year: Often true   Transportation Needs: No Transportation Needs (11/30/2024)    TRANSPORTATION NEEDS     Transportation : No   Physical Activity: Inactive (10/15/2024)    Exercise Vital Sign     Days of Exercise per Week: 0 days     Minutes of Exercise per Session: 0 min   Stress: Stress Concern Present (11/30/2024)    [employer] [place] - Occupational Stress Questionnaire     Feeling of Stress : Very much   Housing Stability: High Risk (11/30/2024)    Housing Stability Vital Sign     Unable to Pay for Housing  "in the Last Year: Yes     Homeless in the Last Year: No     Outpatient Encounter Medications as of 1/3/2025   Medication Sig Dispense Refill    albuterol (PROVENTIL) 2.5 mg /3 mL (0.083 %) nebulizer solution Take 2.5 mg by nebulization every 4 (four) hours as needed. Times a day 75 mL 1    albuterol (PROVENTIL/VENTOLIN HFA) 90 mcg/actuation inhaler Inhale 2 puffs into the lungs every 6 (six) hours as needed for Wheezing. 8.5 g 1    ALPRAZolam (XANAX) 1 MG tablet Take 1 tablet (1 mg total) by mouth 2 (two) times daily as needed for Anxiety. 60 tablet 2    amLODIPine (NORVASC) 5 MG tablet Take 1 tablet (5 mg total) by mouth once daily. 30 tablet 11    BD INSULIN SYRINGE ULTRA-FINE 1/2 mL 30 gauge x 1/2" Syrg   0    blood-glucose sensor (DEXCOM G6 SENSOR) Mariela change sensor every 10 days. 3 each 11    blood-glucose sensor (DEXCOM G7 SENSOR) Mariela Use to check bg. Change every 10 days 3 each 11    blood-glucose transmitter (DEXCOM G6 TRANSMITTER) Mariela 1 each by Misc.(Non-Drug; Combo Route) route every 3 (three) months. 1 each 3    blood-glucose transmitter (DEXCOM G6 TRANSMITTER) Mariela Use as directed change every 3 months 1 each 3    cholecalciferol, vitamin D3, 1,250 mcg (50,000 unit) Tab Take 1 capsule by mouth every 7 days. 12 tablet 3    cyclobenzaprine (FLEXERIL) 10 MG tablet Take 1 tablet (10 mg total) by mouth 3 (three) times daily as needed (Pain). 90 tablet 11    DULoxetine (CYMBALTA) 60 MG capsule Take 1 capsule by mouth 2 times daily 180 capsule 3    estradioL (ESTRACE) 0.01 % (0.1 mg/gram) vaginal cream Place 1 g vaginally twice a week. 42.5 g 3    fenofibrate (TRICOR) 145 MG tablet Take 1 tablet by mouth in the morning. 30 tablet 5    fluticasone propionate (FLONASE) 50 mcg/actuation nasal spray Take 1 spray by Each Nostril route in the morning. 16 g 3    furosemide (LASIX) 20 MG tablet Take 1 tablet (20 mg total) by mouth once daily. 90 tablet 3    glucagon (BAQSIMI) 3 mg/actuation Sp[place] 1 spray by Nasal " "route as needed (hypoglycemia). For emergency use. May repeat 1 time after 15 minutes 2 each 1    glucagon (BAQSIMI) 3 mg/actuation [place] SPRAY 1 SPRAY IN NOSTRIL AS NEEDED FOR EMERGENCY. MAY REPEAT 1 TIME AFTER 15 MINUTES 2 each 1    insulin aspart U-100 (NOVOLOG FLEXPEN U-100 INSULIN) 100 unit/mL (3 mL) InPn pen Inject up to 10 units under the skin per sliding scale 3 times a day before meals 15 mL 3    insulin aspart U-100 (NOVOLOG U-100 INSULIN ASPART) 100 unit/mL injection To use via insulin pump. Up to 200 units every 3 days 20 mL 5    insulin aspart U-100 (NOVOLOG) 100 unit/mL injection NovoLOG FlexPen      insulin glargine U-100, Lantus, (LANTUS SOLOSTAR U-100 INSULIN) 100 unit/mL (3 mL) InPn pen Inject up to 50 units daily in event of pump failure 15 mL 3    insulin pump cart,auto,BT,G6/7 (OMNIPOD 5 G6-G7 PODS, GEN 5,) Crtg 1 each by misc route every 48 hours 180 each 0    insulin pump cart,automated,BT (OMNIPOD 5 G6 PODS, GEN 5,) Crtg 1 each by Misc.(Non-Drug; Combo Route) route every 48 hours. 45 each 3    insulin syringe-needle U-100 0.3 mL 30 gauge x 5/16" Syrg 1 each by Misc.(Non-Drug; Combo Route) route Every 3 (three) days. 100 each 2    lamoTRIgine (LAMICTAL) 100 MG tablet Take 1 tablet (100 mg total) by mouth every morning. 30 tablet 2    levETIRAcetam (KEPPRA) 500 MG Tab Take 1 tablet (500 mg total) by mouth 2 (two) times daily. 30 tablet 0    levocetirizine (XYZAL) 5 MG tablet Take 1 tablet (5 mg total) by mouth every evening. 30 tablet 11    LIDOcaine (LIDODERM) 5 % Place 2 patches onto the skin every 12 (twelve) hours as needed (pain). Remove & Discard patch within 12 hours or as directed by MD 60 patch 11    lifitegrast (XIIDRA) 5 % Dpet Place 1 drop into both eyes 2 (two) times daily. 60 each 12    LORazepam (ATIVAN) 1 MG tablet Take 0.5 tablets (0.5 mg total) by mouth every 6 (six) hours as needed for anxiety 2 tablet 0    magnesium hydroxide 400 mg/5 ml (MILK OF MAGNESIA) 400 mg/5 mL Susp " "Take 5 mLs by mouth every 4 (four) hours as needed.      magnesium oxide (MAG-OX) 400 mg (241.3 mg magnesium) tablet Take 1 tablet (400 mg total) by mouth once daily. 90 tablet 3    metoprolol succinate (TOPROL-XL) 50 MG 24 hr tablet Take 1 tablet (50 mg total) by mouth every morning. 30 tablet 11    nebulizer and compressor (COMP-AIR NEBULIZER COMPRESSOR) Mariela use as directed 1 each 0    omeprazole (PRILOSEC) 40 MG capsule Take 1 capsule (40 mg total) by mouth once daily. 90 capsule 1    ondansetron (ZOFRAN-ODT) 4 MG TbDL Take 1 tablet (4 mg total) by mouth every 8 (eight) hours as needed. 12 tablet 0    OZEMPIC 2 mg/dose (8 mg/3 mL) PnIj Inject 2 mg into the skin every 7 days.      pen needle, diabetic (BD ULTRA-FINE MINI PEN NEEDLE) 31 gauge x 3/16" Ndle Use 1 a day in event of pump failure 100 each 11    polyethylene glycol (GLYCOLAX) 17 gram/dose powder Take 17 g by mouth 2 (two) times daily. 1020 g 11    promethazine (PHENERGAN) 25 MG tablet Take 25 mg by mouth every 6 (six) hours as needed.      secukinumab (COSENTYX PEN, 2 PENS,) 150 mg/mL PnIj Inject 300 mg into the skin every 14 (fourteen) days. 4 mL 11    semaglutide (OZEMPIC) 2 mg/dose (8 mg/3 mL) PnIj Inject 2 mg under the skin every 7 days. 3 mL 1    spironolactone (ALDACTONE) 25 MG tablet Take 1 tablet by mouth in the morning. 90 tablet 1    SYMBICORT 160-4.5 mcg/actuation HFAA Inhale 2 puffs into the lungs in the morning and 2 puffs before bedtime. 2 puffs every 12 hours 10.2 g 2    zolpidem (AMBIEN) 5 MG Tab Take 1 tablet (5 mg total) by mouth nightly as needed (anxiety). 30 tablet 2    [DISCONTINUED] atorvastatin (LIPITOR) 20 MG tablet Take 1 tablet (20 mg total) by mouth every evening 90 tablet 3    [DISCONTINUED] semaglutide (OZEMPIC) 2 mg/dose (8 mg/3 mL) PnIj Inject 2 mg into the skin every 7 days. 3 mL 2    atorvastatin (LIPITOR) 20 MG tablet Take 1 tablet (20 mg total) by mouth every evening 90 tablet 3    blood-glucose meter,continuous " "(DEXCOM G7 ) Misc 1 each by Misc.(Non-Drug; Combo Route) route once. for 1 dose 1 each 0    cevimeline (EVOXAC) 30 mg capsule Take 1 capsule by mouth in the morning and 1 capsule at noon and 1 capsule before bedtime. Do all this for 30 days. (Patient not taking: Reported on 1/3/2025) 90 capsule 0    ergocalciferol (ERGOCALCIFEROL) 50,000 unit Cap Take 1 capsule (50,000 Units total) by mouth every 7 days. 4 capsule 6    levETIRAcetam (KEPPRA) 500 MG Tab Take 1 tablet (500 mg total) by mouth 2 (two) times daily. 60 tablet 11    semaglutide (OZEMPIC) 2 mg/dose (8 mg/3 mL) PnIj Inject 2 mg into the skin every 7 days. 3 mL 2    triamcinolone acetonide 0.1% (KENALOG) 0.1 % paste Apply coating 2 times daily (Patient not taking: Reported on 1/3/2025) 5 g 12    [DISCONTINUED] clonazePAM (KLONOPIN) 1 MG tablet Take 1 tablet by mouth daily 30 tablet 0    [DISCONTINUED] glucagon, human recombinant, (GLUCAGON EMERGENCY KIT, HUMAN,) 1 mg SolR Inject 1 mg into the muscle as needed. Emergency use 1 each 1    [DISCONTINUED] semaglutide (OZEMPIC) 2 mg/dose (8 mg/3 mL) PnIj Inject 2 mg under the skin every 7 days. 3 mL 2    [DISCONTINUED] valsartan (DIOVAN) 320 MG tablet Take 1 tablet (320 mg total) by mouth once daily. 90 tablet 3     No facility-administered encounter medications on file as of 1/3/2025.           Objective:      /70 (BP Location: Right arm, Patient Position: Sitting)   Pulse 89   Temp 99.2 °F (37.3 °C) (Oral)   Ht 4' 8" (1.422 m)   Wt 91.2 kg (201 lb)   SpO2 98%   BMI 45.06 kg/m²   Physical Exam    General: In no acute distress.+obesity  Head: Normocephalic. Non traumatic.  Eyes: PERRLA.  Ears: EACs clear. TMs normal.  Nose: Mucosa pink. Mucosa moist. No obstruction.  Throat: Clear. No exudates. No lesions.  Neck: Supple. No masses. No thyromegaly. No bruits.  Chest: Lungs clear. No rales. No rhonchi. No wheezes.  Heart: RRR. No murmurs. No rubs. No gallops.  Abdomen: Soft. No tenderness. No " masses. BS normal.  Extremities: Warm. Well perfused. No upper extremity edema. No lower extremity edema. FROM. No deformities. No joint erythema.  Neuro: No focal deficits appreciated. Good muscle tone. Normal response to visual stimuli. Normal response to auditory stimuli.  Skin: Normal. No rashes. No lesions noted.   Protective Sensation (w/ 10 gram monofilament):  Right: Intact  Left: Intact    Visual Inspection:  Normal -  Bilateral    Pedal Pulses:   Right: Present  Left: Present    Posterior Tibialis Pulses:   Right:Present  Left: Present          Results for orders placed or performed during the hospital encounter of 12/18/24   POCT glucose    Collection Time: 12/18/24  6:23 PM   Result Value Ref Range    POCT Glucose 138 (H) 70 - 110 mg/dL   CBC auto differential    Collection Time: 12/18/24  7:15 PM   Result Value Ref Range    WBC 8.94 3.90 - 12.70 K/uL    RBC 4.16 4.00 - 5.40 M/uL    Hemoglobin 9.4 (L) 12.0 - 16.0 g/dL    Hematocrit 32.4 (L) 37.0 - 48.5 %    MCV 78 (L) 82 - 98 fL    MCH 22.6 (L) 27.0 - 31.0 pg    MCHC 29.0 (L) 32.0 - 36.0 g/dL    RDW 15.7 (H) 11.5 - 14.5 %    Platelets 390 150 - 450 K/uL    MPV 8.8 (L) 9.2 - 12.9 fL    Immature Granulocytes 1.1 (H) 0.0 - 0.5 %    Gran # (ANC) 5.2 1.8 - 7.7 K/uL    Immature Grans (Abs) 0.10 (H) 0.00 - 0.04 K/uL    Lymph # 3.1 1.0 - 4.8 K/uL    Mono # 0.5 0.3 - 1.0 K/uL    Eos # 0.0 0.0 - 0.5 K/uL    Baso # 0.01 0.00 - 0.20 K/uL    nRBC 0 0 /100 WBC    Gran % 57.9 38.0 - 73.0 %    Lymph % 35.0 18.0 - 48.0 %    Mono % 5.9 4.0 - 15.0 %    Eosinophil % 0.0 0.0 - 8.0 %    Basophil % 0.1 0.0 - 1.9 %    Differential Method Automated    Comprehensive metabolic panel    Collection Time: 12/18/24  7:15 PM   Result Value Ref Range    Sodium 136 136 - 145 mmol/L    Potassium 5.0 3.5 - 5.1 mmol/L    Chloride 101 95 - 110 mmol/L    CO2 24 23 - 29 mmol/L    Glucose 116 (H) 70 - 110 mg/dL    BUN 10 6 - 20 mg/dL    Creatinine 0.7 0.5 - 1.4 mg/dL    Calcium 9.9 8.7 - 10.5  mg/dL    Total Protein 7.1 6.0 - 8.4 g/dL    Albumin 3.4 (L) 3.5 - 5.2 g/dL    Total Bilirubin 0.2 0.1 - 1.0 mg/dL    Alkaline Phosphatase 89 40 - 150 U/L    AST 11 10 - 40 U/L    ALT 16 10 - 44 U/L    eGFR >60 >60 mL/min/1.73 m^2    Anion Gap 11 8 - 16 mmol/L   Magnesium    Collection Time: 12/18/24  7:15 PM   Result Value Ref Range    Magnesium 1.9 1.6 - 2.6 mg/dL   Phosphorus    Collection Time: 12/18/24  7:15 PM   Result Value Ref Range    Phosphorus 3.9 2.7 - 4.5 mg/dL   TSH    Collection Time: 12/18/24  7:15 PM   Result Value Ref Range    TSH 1.888 0.400 - 4.000 uIU/mL   Levetiracetam level    Collection Time: 12/18/24  7:15 PM   Result Value Ref Range    Levetiracetam Lvl <1.0 3.0 - 60.0 ug/mL     *Note: Due to a large number of results and/or encounters for the requested time period, some results have not been displayed. A complete set of results can be found in Results Review.     Assessment:       1. Gastroenteritis    2. Nausea    3. Myoclonic seizure    4. Dyslipidemia associated with type 2 diabetes mellitus    5. Type 2 diabetes mellitus with hyperglycemia, with long-term current use of insulin    6. Vitamin D deficiency    Assessment & Plan    G40.919 Epilepsy, unspecified, intractable, without status epilepticus  R11.10 Vomiting, unspecified  R19.7 Diarrhea, unspecified  E55.9 Vitamin D deficiency, unspecified    SEIZURE MANAGEMENT:  - Assessed the patient's seizure management on current Keppra regimen.  - Continued Keppra 500 mg twice daily for seizure control.    NEW ONSET SEIZURES:  - Identified need for neurological consultation due to new onset seizures.  - Referred the patient to [facility] for evaluation of new onset seizures.    NAUSEA AND VOMITING:  - Evaluated the patient's acute GI symptoms.  - Ms. Betts reported vomiting that has been present for 3 days.  - Determined need for antiemetic therapy.  - Prescribed Zofran for nausea and vomiting management.    DIARRHEA:  -  Evaluated the patient's acute GI symptoms.  - Ms. Betts reported diarrhea that has been present for 3 days.    VITAMIN D DEFICIENCY:  - Recognized vitamin D deficiency requiring supplementation.  - Prescribed vitamin D 50,000 units weekly for vitamin D deficiency management.        Plan:   Gastroenteritis-  Diarrhea/Gastroenteritis  Instructed patient to drink 1/2 water and 1/2 power aide/Gatorade to maintain hydration  Follow a low residue diet  Take prescribed medications as directed.  Follow up with PCP recommended in 1-2 days or sooner if symptoms worsen or no improvement.      Nausea    Myoclonic seizure  -     levETIRAcetam (KEPPRA) 500 MG Tab; Take 1 tablet (500 mg total) by mouth 2 (two) times daily.    -     Levetiracetam level; Future; Expected date: 01/03/2025  -     Ambulatory referral/consult to Neurology; Future; Expected date: 01/10/2025    Dyslipidemia associated with type 2 diabetes mellitus  -     atorvastatin (LIPITOR) 20 MG tablet; Take 1 tablet (20 mg total) by mouth every evening      Type 2 diabetes mellitus with hyperglycemia, with long-term current use of insulin  -     semaglutide (OZEMPIC) 2 mg/dose (8 mg/3 mL) PnIj; Inject 2 mg into the skin every 7 days.  -     Foot Exam Performed    Vitamin D deficiency  -     ergocalciferol (ERGOCALCIFEROL) 50,000 unit Cap; Take 1 capsule (50,000 Units total) by mouth every 7 days.    Today's encounter took a total time of 20 minutes, and that time included Preparing to see the patient (review records, tests), Obtaining and/or reviewing separately obtained historical data, Performing a medically appropriate examination and/or evaluation , Counseling & educating the patient/family/caregiver on treatment plan with chronic health conditions , ordering medications, tests, and/or procedures, Referring and communicating with other healthcare professionals , Documenting clinical information in the electronic or other health record, Independently  interpreting results & communicating results to the patient/family/caregiver.           [facility]   [address]  [place] [address]  Office [phone]  Fax [phone]   This note was generated with the assistance of ambient listening technology. Verbal consent was obtained by the patient and accompanying visitor(s) for the recording of patient appointment to facilitate this note. I attest to having reviewed and edited the generated note for accuracy, though some syntax or spelling errors may persist. Please contact the author of this note for any clarification.

## 2025-01-06 LAB — LEVETIRACETAM SERPL-MCNC: 14.8 UG/ML (ref 3–60)

## 2025-01-07 ENCOUNTER — TELEPHONE (OUTPATIENT)
Dept: PRIMARY CARE CLINIC | Facility: CLINIC | Age: 55
End: 2025-01-07
Payer: MEDICAID

## 2025-01-07 ENCOUNTER — TELEPHONE (OUTPATIENT)
Dept: RHEUMATOLOGY | Facility: CLINIC | Age: 55
End: 2025-01-07
Payer: MEDICAID

## 2025-01-07 DIAGNOSIS — Z79.4 TYPE 2 DIABETES MELLITUS WITH HYPERGLYCEMIA, WITH LONG-TERM CURRENT USE OF INSULIN: ICD-10-CM

## 2025-01-07 DIAGNOSIS — E11.65 TYPE 2 DIABETES MELLITUS WITH HYPERGLYCEMIA, WITH LONG-TERM CURRENT USE OF INSULIN: ICD-10-CM

## 2025-01-07 RX ORDER — INSULIN PMP CART,AUT,G6/7,CNTR
1 EACH SUBCUTANEOUS
Qty: 45 EACH | Refills: 3 | Status: SHIPPED | OUTPATIENT
Start: 2025-01-07

## 2025-01-07 NOTE — TELEPHONE ENCOUNTER
C/o mouth ulcers since June states they are very painful.  Has been treated for thrush and a vaginal yeast infection in the past 6 months and the mouth ulcers do not go away.    On Cosentyx only. She states that she never picked up sulfasalazine that was rx'd in November

## 2025-01-07 NOTE — TELEPHONE ENCOUNTER
Cosentyx does not cause oral ulcers. Please advice to follow up with her ENT specialist Dr. Anthony to find the cause of her oral ulcer. Thanks.

## 2025-01-07 NOTE — TELEPHONE ENCOUNTER
Spoke with pt and scheduled a virtual appt for pt    ----- Message from Analisa sent at 1/7/2025 12:34 PM CST -----  Contact: Pt  [phone]  [email or web address]     Patient is calling for Medical Advice regarding: medication symptoms / RX Cosentyx    How long has patient had these symptoms: 8 months    Pharmacy name and phone#:   [facility]  [address]  [place] [address]  Phone: [phone] Fax: [phone]    Patient wants a call back or thru [facility]: Call back    Comments:    Please advise patient replies from provider may take up to 48 hours.

## 2025-01-07 NOTE — TELEPHONE ENCOUNTER
----- Message from SeamBLiSS sent at 1/7/2025 11:52 AM CST -----  Contact: self  ..Type:  Needs Medical Advice    Who Called: .Yolanda Betts  Symptoms (please be specific): reaction to medication    How long has patient had these symptoms:    Pharmacy name and phone #:    Would the patient rather a call back or a response via [facility]? Call back   Best Call Back Number: .[phone] (home)   Additional Information: pt states she is needing an return call in reference to reaction to medication.

## 2025-01-07 NOTE — TELEPHONE ENCOUNTER
Patient states that she spoke with the specialty pharmacist and he told her that the mouth ulcers could be caused by Cosentyx because it is a biologic that decreases her immune system.  Patient was not agreeable to accept that the ulcers may be due to something other than the medication.  Scheduled her a virtual visit to discuss with you.

## 2025-01-08 ENCOUNTER — OFFICE VISIT (OUTPATIENT)
Dept: DIABETES | Facility: CLINIC | Age: 55
End: 2025-01-08
Payer: MEDICAID

## 2025-01-08 DIAGNOSIS — Z79.4 TYPE 2 DIABETES MELLITUS WITH HYPERGLYCEMIA, WITH LONG-TERM CURRENT USE OF INSULIN: Primary | ICD-10-CM

## 2025-01-08 DIAGNOSIS — E11.65 TYPE 2 DIABETES MELLITUS WITH HYPERGLYCEMIA, WITH LONG-TERM CURRENT USE OF INSULIN: Primary | ICD-10-CM

## 2025-01-08 NOTE — PROGRESS NOTES
Subjective:         Patient ID: Yolanda Betts is a 54 y.o. female.  Patient's current PCP is Robe Britton MD.       Chief Complaint: No chief complaint on file.      HPI  Yolanda Betts is a 54 y.o. Black or  female presenting for a follow up for diabetes. Patient has been diagnosed with diabetes for > 10 years.  Currently on the omnipod 5.     Complications related to diabetes:  HTN, Hyperlipidemia, peripheral neuropathy; denies Pancreatitis; denies Gastroparesis; denies DKA; denies Hx/family Hx of MEN2/MTC; reports Frequent UTIs/yeast infections; Bariatric surgery 6/1/21. IBS- diarrhea/constipation    Past failed treatment include:  Jardiance- multiple yeast infections; Victoza - GI upset. GLP-1- not advised due to GI issues (per GI and DM management), Metformin- low BGs    Blood glucose testing is performed regularly with her Dexcom. Interpretation of CGMS as follows: Average Glucose: 174 mg/dl; 9 % Very High; 26% High; 65 % In Range; 0% Low;  0% Very Low.     Compliance:The patient reports medication and diet noncompliance - Since the last visit- BG have improved, working with the RD- counting carbs, calories.      The patient location is: home, [place]  The chief complaint leading to consultation is: DM follow up    Visit type: audiovisual    Face to Face time with patient: 20 minutes of total time spent on the encounter, which includes face to face time and non-face to face time preparing to see the patient (eg, review of tests), Obtaining and/or reviewing separately obtained history, Documenting clinical information in the electronic or other health record, Independently interpreting results (not separately reported) and communicating results to the patient/family/caregiver, or Care coordination (not separately reported). Each patient to whom he or she provides medical services by telemedicine is:  (1) informed of the relationship between the physician and patient and the  respective role of any other health care provider with respect to management of the patient; and (2) notified that he or she may decline to receive medical services by telemedicine and may withdraw from such care at any time.         //   , There is no height or weight on file to calculate BMI.  Her blood sugar in clinic today is:   Lab Results   Component Value Date    POCGLU 214 (A) 11/17/2024       Labs reviewed and are noted below.  Her most recent A1C is:  Lab Results   Component Value Date    HGBA1C 9.7 (H) 11/18/2024    HGBA1C 6.6 (H) 06/27/2024    HGBA1C 6.5 (H) 11/29/2023     Lab Results   Component Value Date    CPEPTIDE 4.56 02/26/2018     Lab Results   Component Value Date    GLUTAMICACID 0.00 02/26/2018     Glucose   Date Value Ref Range Status   12/18/2024 116 (H) 70 - 110 mg/dL Final     Anion Gap   Date Value Ref Range Status   12/18/2024 11 8 - 16 mmol/L Final     eGFR if    Date Value Ref Range Status   03/08/2022 >60.0 >60 mL/min/1.73 m^2 Final     eGFR if non    Date Value Ref Range Status   03/08/2022 >60.0 >60 mL/min/1.73 m^2 Final     Comment:     Calculation used to obtain the estimated glomerular filtration  rate (eGFR) is the CKD-EPI equation.          CURRENT DM MEDICATIONS:   Diabetes Medications               glucagon (BAQSIMI) 3 mg/actuation Spry 1 spray by Nasal route as needed (hypoglycemia). For emergency use. May repeat 1 time after 15 minutes    insulin aspart U-100 (NOVOLOG FLEXPEN U-100 INSULIN) 100 unit/mL (3 mL) InPn pen Inject 24 Units into the skin 3 (three) times daily before meals. Use per sliding scale for breakfast and dinner    insulin aspart U-100 (NOVOLOG U-100 INSULIN ASPART) 100 unit/mL injection Inject 100 Units into the skin Every 3 (three) days. To use via insulin pump                   Diabetes Management Status    Statin: Taking  ACE/ARB: Taking    Screening or Prevention Patient's value Goal Complete/Controlled?   HgA1C Testing  and Control   Lab Results   Component Value Date    HGBA1C 9.7 (H) 11/18/2024      Annually/Less than 8% Yes   Lipid profile : 11/30/2024 Annually Yes   LDL control Lab Results   Component Value Date    LDLCALC 91.6 11/30/2024    Annually/Less than 100 mg/dl  Yes   Nephropathy screening Lab Results   Component Value Date    LABMICR 10.0 12/17/2024     Lab Results   Component Value Date    PROTEINUA Negative 11/30/2024    Annually Yes   Blood pressure BP Readings from Last 1 Encounters:   01/03/25 112/70    Less than 140/90 Yes   Dilated retinal exam : 03/08/2024 Annually Yes   Foot exam   : 01/03/2025 Annually Yes     Review of Systems   Constitutional:  Positive for appetite change (decreased). Negative for activity change.   Gastrointestinal:  Positive for constipation and diarrhea. Negative for abdominal pain, nausea and vomiting.        Hx of IBS, GERD   Endocrine: Positive for polydipsia. Negative for polyphagia and polyuria.   Musculoskeletal:  Positive for arthralgias.   Neurological:  Negative for syncope and weakness.        Neuropathy   Psychiatric/Behavioral:  Negative for confusion.         Depression         Objective:      Physical Exam  Constitutional:       General: She is not in acute distress.     Appearance: She is not ill-appearing, toxic-appearing or diaphoretic.   Neurological:      Mental Status: She is alert and oriented to person, place, and time.   Psychiatric:         Mood and Affect: Mood normal.         Assessment:       1. Type 2 diabetes mellitus with hyperglycemia, with long-term current use of insulin                  Plan:   Type 2 diabetes mellitus with hyperglycemia, with long-term current use of insulin              OP5  Novolog  Basal 0.5 Units/hr  Insulin : Carb Ratios 10 g/Unit  Sensitivity (ISF, Correction) 35 mg/dL  BG Target Range 130 mg/dL (+0/-0)  BG Correction Threshold 130 mg/dL         PLAN:   - Condition: uncontrolled    - Monitor blood glucose 4x daily. Goals  reviewed  - She is working with the RD  - BP and LDL- Recommended lifestyle modifications for management. Encouraged healthy low fat, low carb diet and increase physical activity  - Pump Changes: change BG target to 120 to be slightly more aggressive  - Continue to see the psychiatrist for depression (causing reduced appetite); discussed coping techniques  - The patient was explained the above plan and given opportunity to ask questions.  She understands, chooses and consents to this plan and accepts all the risks, which include but are not limited to the risks mentioned above.   - Labs ordered as above  - Nurse visit:  deferred    - Follow up: 1 month and 2 months        For pump failure:    Use Novolog Insulin Carb ratio 1:15     Lantus 14 units once daily    Correction scale (for steroid use) :  Novolog every 3 hours using correction scale outside of mealtime.  -200: 2 units  -250: 4 units  -300: 6 units  -350: 8 units  BG greater than 350: 10 units      A total of 20 minutes was spent in face to face time, of which over 50% was spent in counseling patient on disease process, complications, treatment, and side effects of medications.

## 2025-01-09 ENCOUNTER — HOSPITAL ENCOUNTER (OUTPATIENT)
Dept: RADIOLOGY | Facility: HOSPITAL | Age: 55
Discharge: HOME | End: 2025-01-09
Attending: PHYSICIAN ASSISTANT
Payer: MEDICAID

## 2025-01-09 DIAGNOSIS — R51.9 ACUTE INTRACTABLE HEADACHE, UNSPECIFIED HEADACHE TYPE: ICD-10-CM

## 2025-01-09 DIAGNOSIS — J45.40 MODERATE PERSISTENT ASTHMA WITHOUT COMPLICATION: ICD-10-CM

## 2025-01-09 PROCEDURE — 25500020 PHARM REV CODE 255: Performed by: PHYSICIAN ASSISTANT

## 2025-01-09 PROCEDURE — 70553 MRI BRAIN STEM W/O & W/DYE: CPT | Mod: 26,,, | Performed by: RADIOLOGY

## 2025-01-09 PROCEDURE — 70553 MRI BRAIN STEM W/O & W/DYE: CPT | Mod: TC

## 2025-01-09 PROCEDURE — A9585 GADOBUTROL INJECTION: HCPCS | Performed by: PHYSICIAN ASSISTANT

## 2025-01-09 RX ORDER — GADOBUTROL 604.72 MG/ML
10 INJECTION INTRAVENOUS
Status: COMPLETED | OUTPATIENT
Start: 2025-01-09 | End: 2025-01-09

## 2025-01-09 RX ADMIN — GADOBUTROL 10 ML: 604.72 INJECTION INTRAVENOUS at 05:01

## 2025-01-10 ENCOUNTER — PATIENT OUTREACH (OUTPATIENT)
Dept: ADMINISTRATIVE | Facility: OTHER | Age: 55
End: 2025-01-10
Payer: MEDICAID

## 2025-01-10 RX ORDER — BUDESONIDE AND FORMOTEROL FUMARATE DIHYDRATE 160; 4.5 UG/1; UG/1
AEROSOL RESPIRATORY (INHALATION)
Qty: 10.2 G | Refills: 2 | Status: SHIPPED | OUTPATIENT
Start: 2025-01-10

## 2025-01-10 NOTE — PROGRESS NOTES
CHW - Follow Up    This LPN completed a follow up visit with patient via telephone today. 01/10/25  Pt/Caregiver reported: Still has not checked her incoming mail. Requesting resources for rent and utility assistance be mailed again.  Community Health Worker provided: Will mail resources x 3 for  utility and rent assistance. Will also text contact numbers to call to start application for help.  Follow up required: No

## 2025-01-10 NOTE — PROGRESS NOTES
CHW - Case Closure    This LPN spoke to patient via telephone today. 01/10/25  Pt/Caregiver reported: Has not checked her incoming mail.  Information mailed x 2 and text.  Pt/Caregiver denied any additional needs at this time and agrees with episode closure at this time.  Provided patient with Community Health Worker's contact information and encouraged him/her to contact this LPN if additional needs arise.

## 2025-01-13 ENCOUNTER — PATIENT MESSAGE (OUTPATIENT)
Dept: PRIMARY CARE CLINIC | Facility: CLINIC | Age: 55
End: 2025-01-13
Payer: MEDICAID

## 2025-01-16 ENCOUNTER — OFFICE VISIT (OUTPATIENT)
Dept: NEUROSURGERY | Facility: CLINIC | Age: 55
End: 2025-01-16
Payer: MEDICAID

## 2025-01-16 ENCOUNTER — PATIENT MESSAGE (OUTPATIENT)
Dept: NEUROLOGY | Facility: CLINIC | Age: 55
End: 2025-01-16
Payer: MEDICAID

## 2025-01-16 ENCOUNTER — TELEPHONE (OUTPATIENT)
Facility: CLINIC | Age: 55
End: 2025-01-16
Payer: MEDICAID

## 2025-01-16 DIAGNOSIS — G43.109 MIGRAINE WITH AURA AND WITHOUT STATUS MIGRAINOSUS, NOT INTRACTABLE: Primary | Chronic | ICD-10-CM

## 2025-01-16 PROCEDURE — 3072F LOW RISK FOR RETINOPATHY: CPT | Mod: CPTII,95,, | Performed by: PHYSICIAN ASSISTANT

## 2025-01-16 PROCEDURE — 98006 SYNCH AUDIO-VIDEO EST MOD 30: CPT | Mod: 95,,, | Performed by: PHYSICIAN ASSISTANT

## 2025-01-16 NOTE — TELEPHONE ENCOUNTER
Spoke to Pt about scheduling an appt for her headaches. The Pt stated her seizures. I clarified if she needed to see an epilespy provider or headache provider. Pt stated both and that is very complicated with many questions. Pt stated she was put on Keppra. Informed her that if I schedule with a headache provider then she will only be treated for headaches. Pt needs to be seen by a general neurologist due to complexity which Pt stated herself.     ----- Message from Sabina Cruz sent at 12/26/2024  3:42 PM CST -----  Regarding: Scheduling  Good afternoon,     This patient was seen in clinic earlier today with Deepthi Ho PA-C. Deepthi would like for the patient to be scheduled for an appointment to evaluate her headaches. Can I please have assistance with getting the patient scheduled for an appointment? Thank you!

## 2025-01-21 ENCOUNTER — TELEPHONE (OUTPATIENT)
Dept: PRIMARY CARE CLINIC | Facility: CLINIC | Age: 55
End: 2025-01-21
Payer: MEDICAID

## 2025-01-21 NOTE — TELEPHONE ENCOUNTER
PC with pt- states that she feels like she did when she had gallbladder issues - states she had loose stool for 1-2 days, states she feels quesy- denies any vomiting or fever.     [facility]

## 2025-01-21 NOTE — TELEPHONE ENCOUNTER
----- Message from Reba sent at 1/21/2025 10:06 AM CST -----  Contact: Patient, [phone]  .[email or web address]     Patient is calling for Medical Advice regarding: Nausea    How long has patient had these symptoms: A mamta    Pharmacy name and phone#:   [facility]  [address]  [place] [address]  Phone: [phone] Fax: [phone]    Patient wants a call back or thru [facility]: Call back    Comments: Calling for advise. Please call her. Thanks.    Please advise patient replies from provider may take up to 48 hours.

## 2025-01-23 ENCOUNTER — OFFICE VISIT (OUTPATIENT)
Dept: PSYCHIATRY | Facility: CLINIC | Age: 55
End: 2025-01-23
Payer: MEDICAID

## 2025-01-23 DIAGNOSIS — F25.0 SCHIZOAFFECTIVE DISORDER, BIPOLAR TYPE: Primary | Chronic | ICD-10-CM

## 2025-01-23 DIAGNOSIS — F99 INSOMNIA DUE TO OTHER MENTAL DISORDER: ICD-10-CM

## 2025-01-23 DIAGNOSIS — R45.89 ANXIETY ABOUT HEALTH: ICD-10-CM

## 2025-01-23 DIAGNOSIS — F41.1 GENERALIZED ANXIETY DISORDER: Chronic | ICD-10-CM

## 2025-01-23 DIAGNOSIS — F51.05 INSOMNIA DUE TO OTHER MENTAL DISORDER: ICD-10-CM

## 2025-01-23 RX ORDER — ZOLPIDEM TARTRATE 6.25 MG/1
6.25 TABLET, FILM COATED, EXTENDED RELEASE ORAL NIGHTLY PRN
Qty: 30 TABLET | Refills: 2 | Status: SHIPPED | OUTPATIENT
Start: 2025-01-23 | End: 2025-04-23

## 2025-01-23 NOTE — PROGRESS NOTES
Outpatient Psychiatry Follow-Up Visit    1/23/2025    Virtual Visit    The patient location is: Patient's home/ Patient reported that his/her location at the time of this visit was in the [facility]     Visit type: Virtual visit with synchronous audio and video     Each patient to whom he or she provides medical services by telehealth is: (1) informed of the relationship between the medical psychologist and patient and the respective role of any other health care provider with respect to management of the patient; and (2) notified that he or she may decline to receive medical services by telehealth and may withdraw from such care at any time.    I also informed patient of the following:   Mariam Young, PhD, MPAP:  [place] medical license number: [id number]    My contact info:  [facility] / 2nd Floor  [address] [place] [address]   Ph: [phone]    If technology issues, call office phone: Ph: [phone] or [phone]  If crisis: Dial 911 or go to nearest Emergency Room (ER)  If questions related to privacy practices: contact [facility] Information Department: [phone]    Preferred Name: Yolanda  Gender Identity: cis female  Preferred Pronouns: she/her    LAST VISIT: AFTERCARE: Yolanda attended her visit. Since we last met, she called and stated that she still was not sleeping. We had previously decreased her Caplyta back to 10.5 mg--she had an episode of jerking/shaking and had gone to the ER.  She was in ICU for 4 days. She reported having a hematoma on her brain. Her physical medicines were changed, but not her psychiatric medicines. She is feeling better than she has in a long time.      November 29th she went to [place] with friends and was not feeling right. She fell a couple of weeks before that. [facility] had [facility]--when walking out knew she couldn't make it and went back in. She started shaking--was aware of what was happening but couldn't  stop her body from shaking. She went to [facility] and they did a scan and let her go home. Five days later, she had another episode at home. It was after Thanksgiving and she was cooking. She could feel it coming on--she opened her front door, called her daughter and then 911. By then, she was having a seizure--EMT went and took her to the ER. She said that the EMT did not think that she was having a seizure and initially was resistant to taking her to the ER. [facility] did not have a neurosurgeon, so she was taken to the [facility] in case she needed surgery.  She recovered well.     Her nephew is living with her--he is 16 y/o. He had been going back and forth between her sister and nephew's dad's girlfriend (nephew's dad is in nursing home). The girlfriend reportedly told Yolanda that she did not want to raise kids. Her 14-y/o nephew is living with Yolanda's sister, and Yolanda got the 17-y/o. Each kid reportedly gets SSI but that goes to his godparents--they have not been in their care since sometime last year. Her 32-y/o niece recently cursed Yolanda. They have had conflict--and with her sister and brother.  We spent some time discussing appropriate boundaries.  Thania has set good boundaries and limits with her family and will likely need to continue to do so.     This is the best Yolanda has looked in a long time--coordinated clothing, red lipstick. She has been taking all of her medicines the same except Lamictal--she has been taking 100 mg mornings only.  She did not report any abnormal involuntary movements, and none were observed during this visit.  We are continuing her medicines as noted.      Plan--continue Caplyta 10.5 mg; Xanax 1 mg bid; Ambien 5 mg hs; Lamictal 100 mg mornings; takes Cymbalta 60 mg bid from another provider;     History of Present Illness    CHIEF COMPLAINT:  Yolanda presents for a follow-up visit to discuss medication management and ongoing mental health concerns, last seen in December.    HPI:  Yolanda reports  "experiencing high levels of anxiety, which she attributes to difficulties in scheduling appointments. However, she notes that this is improving now that appointments have been secured. The anxiety has manifested in pacing behavior, with repetitive actions like going to the kitchen, opening the refrigerator, and sitting down without a clear purpose.    Yolanda states her mood has been "great" despite the anxiety. She appears to be stable on her current medication regimen, particularly the Lamotrigine.    Yolanda reports difficulty staying asleep, waking up at 2-3 AM and only falling back asleep around 5:30-6 AM. She can fall asleep initially but struggles with maintaining sleep throughout the night. The current Ambien prescription is not effectively addressing this issue.    Yolanda mentions feeling "a little tired," which may be related to her sleep disturbances.    Yolanda describes occasionally catching glimpses of movement in her peripheral vision, but when she turns to look directly, nothing is there. This has been an ongoing experience and is not new.    Yolanda has discontinued Caplyta but continues with Lamotrigine for mood stabilization and Cymbalta for pain management. She is still using Xanax as prescribed for anxiety management.    Yolanda denies experiencing hallucinations or any unusual experiences beyond the minor visual disturbances mentioned. Yolanda denies any worsening of her mood or mental health symptoms.    MEDICATIONS:  Yolanda is on Lamotrigine 100 mg orally for mood stabilization, which has been effective. She is also taking Cymbalta orally for pain and anxiety, and Xanax 1 mg twice daily orally for anxiety. Caplyta 10.5 mg oral has been discontinued. Ambien, previously taken orally for sleep, was discontinued due to ineffectiveness in maintaining sleep--will try the extended version.    LIFESTYLE:  Yolanda has been staying at home during recent snowy weather, observing the snow from inside. She expresses a need to " "resume reading her Bible, indicating a Taoist or spiritual practice. Yolanda mentions plans to bake sweet potatoes and make sweet potato pie mix. Due to the snow, she has been confined to her house for a few days, limiting her social activities. She expresses a desire to "see the sun," suggesting a preference for outdoor activities when weather permits.      ROS:  Constitutional: +fatigue  Psychiatric: +anxiety, +sleep difficulty, -hallucinations, -mood swings       PSYCHIATRIC: Pertinant items are noted in the narrative.    Past Medical, Family and Social History: The patient's past medical, family and social history have been reviewed and updated as appropriate within the electronic medical record - see encounter notes.     since December 2024.            1/23/2025    11:26 AM 8/30/2024     9:55 AM 6/12/2024     9:45 AM   GAD7   1. Feeling nervous, anxious, or on edge? 3  3  3    2. Not being able to stop or control worrying? 3  3  3    3. Worrying too much about different things? 3  3  3    4. Trouble relaxing? 3  3  3    5. Being so restless that it is hard to sit still? 3  3  3    6. Becoming easily annoyed or irritable? 3  3  3    7. Feeling afraid as if something awful might happen? 3  3  3    8. If you checked off any problems, how difficult have these problems made it for you to do your work, take care of things at home, or get along with other people? 3  3     JOANN-7 Score 21  21 21       Patient-reported      0-4 = Minimal anxiety  5-9 = Mild anxiety  10-14 = Moderate anxiety  15-21 = Severe anxiety         2/23/2023     2:15 PM 8/26/2022     8:28 AM   Depression Patient Health Questionnaire   Over the last two weeks how often have you been bothered by little interest or pleasure in doing things Nearly every day Nearly every day   Over the last two weeks how often have you been bothered by feeling down, depressed or hopeless Nearly every day Nearly every day   PHQ-2 Total Score 6 6   Over the last two " weeks how often have you been bothered by trouble falling or staying asleep, or sleeping too much Nearly every day Nearly every day   Over the last two weeks how often have you been bothered by feeling tired or having little energy Nearly every day Nearly every day   Over the last two weeks how often have you been bothered by a poor appetite or overeating Not at all Nearly every day   Over the last two weeks how often have you been bothered by feeling bad about yourself - or that you are a failure or have let yourself or your family down Nearly every day Nearly every day   Over the last two weeks how often have you been bothered by trouble concentrating on things, such as reading the newspaper or watching television Nearly every day Nearly every day   Over the last two weeks how often have you been bothered by moving or speaking so slowly that other people could have noticed. Or the opposite - being so fidgety or restless that you have been moving around a lot more than usual. Nearly every day Nearly every day   Over the last two weeks how often have you been bothered by thoughts that you would be better off dead, or of hurting yourself Not at all Not at all   If you checked off any problems, how difficult have these problems made it for you to do your work, take care of things at home or get along with other people? Very difficult Very difficult   PHQ-9 Score 21 24   PHQ-9 Interpretation Severe Severe     0-4 = No intervention  5 to 9 = Mild  10 to 14 = Moderate  15 to 19 = Moderately severe  =20 = Severe    Risk Parameters:  Patient reports no suicidal ideation  Patient reports no homicidal ideation  Patient reports no self-injurious behavior  Patient reports no violent behavior    Exam (detailed: at least 9 elements; comprehensive: all 15 elements)   Constitutional  Vitals:  Most recent vital signs were reviewed.   Last 3 sets of Vitals        12/18/2024     7:54 PM 12/26/2024     2:58 PM 1/3/2025     8:18 AM  "  Vitals - 1 value per visit   SYSTOLIC   112   DIASTOLIC   70   Pulse   89   Temp   99.2 °F (37.3 °C)   SPO2   98 %   Weight (lb) 208.4  201   Weight (kg) 94.53  91.173   Height   4' 8" (1.422 m)   BMI (Calculated) 46.7  45.1   Pain Score  Ten Eight          General:  age appropriate, casually dressed, neatly groomed     Musculoskeletal  Muscle Strength/Tone:  no tremor, no tic   Gait & Station:  video visit     Psychiatric  Speech:  no latency; no press   Behavior: wnl   Mood & Affect:  anxious  congruent and appropriate   Thought Process:  normal and logical   Associations:  intact   Thought Content:  normal, no suicidality, no homicidality, delusions, or paranoia   Insight:  has awareness of illness   Judgement: behavior is adequate to circumstances   Orientation:  grossly intact   Memory: intact for content of interview   Language: grossly intact   Attention Span & Concentration:  Grossly intact   Fund of Knowledge:  intact and appropriate to age and level of education     General Impression:     Encounter Diagnoses   Name Primary?    Schizoaffective disorder, bipolar type Yes    Generalized anxiety disorder     Anxiety about health     Insomnia due to other mental disorder        Assessment & Plan    - Patient's mood stable on current regimen of Lamotrigine 100mg  - Discontinued Caplyta --had already stopped  - Sleep issues persist despite Zolpidem (Ambien) use; considering extended-release formulation for sleep maintenance  - Anxiety symptoms noted, including pacing behavior; may be related to appointment-related stress or cabin fever  - Occasional illusions (glimpses of movement) deemed non-pathological    DEPRESSION AND ANXIETY:  - Yolanda to consider resuming Bible reading to address boredom and potentially reduce anxiety.  - Continued Alprazolam (Xanax) 1mg twice daily as prescribed.  - Continued Lamotrigine 100mg daily.    INSOMNIA:  - Started Zolpidem CR (Ambien CR) 6.25mg at bedtime for sleep " maintenance.    FOLLOW-UP:  - Follow up in 6 weeks in the clinic.  - Contact the office if needed before the next appointment.         I spent an additional 6 minutes performing E/M services with >50% spent on counseling, guidance, coordinating care (not Psychotherapy related) in addition to the 16 minutes performing Psychotherapy.    I spent a total of 22 minutes on the day of the visit. This includes face to face time and non-face to face time preparing to see the patient (eg, review of tests), obtaining and/or reviewing separately obtained history, documenting clinical information in the electronic or other health record, independently interpreting results and communicating results to the patient/family/caregiver, or care coordinator.        Mariam Young, PhD, MP  Advanced Practice Medical Psychologist  [facility] [address]  [place] [address]  [phone]   [phone] fax    This note was generated with the assistance of ambient listening technology. Verbal consent was obtained by the patient and accompanying visitor(s) for the recording of patient appointment to facilitate this note. I attest to having reviewed and edited the generated note for accuracy, though some syntax or spelling errors may persist. Please contact the author of this note for any clarification.

## 2025-01-23 NOTE — PATIENT INSTRUCTIONS

## 2025-01-27 ENCOUNTER — TELEPHONE (OUTPATIENT)
Dept: DIABETES | Facility: CLINIC | Age: 55
End: 2025-01-27
Payer: MEDICAID

## 2025-01-27 ENCOUNTER — CLINICAL SUPPORT (OUTPATIENT)
Dept: DIABETES | Facility: CLINIC | Age: 55
End: 2025-01-27
Payer: MEDICAID

## 2025-01-27 ENCOUNTER — TELEPHONE (OUTPATIENT)
Dept: DIABETES | Facility: CLINIC | Age: 55
End: 2025-01-27

## 2025-01-27 DIAGNOSIS — Z79.4 TYPE 2 DIABETES MELLITUS WITH HYPERGLYCEMIA, WITH LONG-TERM CURRENT USE OF INSULIN: Primary | ICD-10-CM

## 2025-01-27 DIAGNOSIS — Z79.4 TYPE 2 DIABETES MELLITUS WITH HYPERGLYCEMIA, WITH LONG-TERM CURRENT USE OF INSULIN: Primary | Chronic | ICD-10-CM

## 2025-01-27 DIAGNOSIS — E11.65 TYPE 2 DIABETES MELLITUS WITH HYPERGLYCEMIA, WITH LONG-TERM CURRENT USE OF INSULIN: Primary | ICD-10-CM

## 2025-01-27 DIAGNOSIS — E11.65 TYPE 2 DIABETES MELLITUS WITH HYPERGLYCEMIA, WITH LONG-TERM CURRENT USE OF INSULIN: Primary | Chronic | ICD-10-CM

## 2025-01-27 PROCEDURE — G0108 DIAB MANAGE TRN  PER INDIV: HCPCS | Mod: 95,,, | Performed by: DIETITIAN, REGISTERED

## 2025-01-27 NOTE — PROGRESS NOTES
Diabetes Care Specialist Progress Note  Author: Yara Sanchez RD, CDE  Date: 1/27/2025    Intake    Program Intake  Reason for Diabetes Program Visit:: Post Program Follow-Up (DM referral 2/5/24 Elizabeth Ceron NP)  Type of Intervention:: Individual  Individual: Education  Education: Advanced Pump (OmniPod5 (training 2/26/24))  Current diabetes risk level:: high  In the last month, have you used the ER or been admitted to the hospital: No    Current Diabetes Treatment:  (Novolog via OP5 and Dex G6 (training 2/26/24). Pump backup: Lantus 50units (pt states dose timing varies based on sleep schedule), Novolog ac per s/s (pt states having chart to follow but unable to state scale).)    Continuous Glucose Monitoring  Patient has CGM: Yes  Personal CGM type:: Dexcom G6 - GMI 8.1. Pt denies device or site placement issues.  GMI Date: 01/27/25  GMI Value: 8.1 %    Lab Results   Component Value Date    HGBA1C 9.7 (H) 11/18/2024     Lifestyle Coping Support & Clinical    Problem Review  Active Comorbidities:  (HTN, HLD, Neuropathy, VitD defn, Bariatric surgery 6/1/21, IBS- diarrhea/constipation, GERD, DAVIS, Iron defn anemia, Asthma, Mental health ds, BMI 45.)    Diabetes Self-Management Skills Assessment    Medication Skills Assessment  Patient is able to identify current diabetes medications, dosages, and appropriate timing of medications.: no (Pt states missed and late bolus dosing. She is unable to demonstrate accurate carb gram ctg using clinic examples. Pt unable to state backup pump bolus dose amounts from yesterday.)  Patient reports problems or concerns with current medication regimen.: yes   Medication regimen problems/concerns:: other (see comments) (Pt states tape issues when wearing on abdomen and is mostly using upper legs. Pt states OP5 Pods & Dexcom communication issues with notification to change Pod. She contacted OmniPod customer support, and they are sending replacement Pods. Unclear why pt wasn't  able to use Pod in manual mode. Pt states using backup pump plan.)  Patient is  aware that some diabetes medications can cause low blood sugar?: Yes  Medication Skills Assessment Completed:: Yes  Assessment indicates:: Instruction Needed  Area of need?: Yes    Nutrition/Healthy Eating  Meal Plan 24 Hour Recall - Breakfast: cereal (raisin bran ~1.5cup) w/ whole milk  Meal Plan 24 Hour Recall - Lunch: froz buritto (2)  Meal Plan 24 Hour Recall - Dinner: [employer] hamburger  Meal Plan 24 Hour Recall - Snack: almonds, strawberry yogurt  Meal Plan 24 Hour Recall - Beverage: water, diet  Patient can identify foods that impact blood sugar.: no (see comments) (Using food frequency meal/snack examples and food models, pt unable to accurately estimate carb gram ctg. She misplaced carb food list guide. Pt states ability to read food label.)  Challenges to healthy eating:: eating out, going to parties (irregular meal patterns)  Nutrition/Healthy Eating Skills Assessment Completed:: Yes  Assessment indicates:: Instruction Needed, Knowledge deficit  Area of need?: Yes    Physical Activity/Exercise  Physical Activity/Exercise Skills Assessment Completed: : No  Deffered due to:: Time  Area of need?: Deferred    Home Blood Glucose Monitoring  Patient states that blood sugar is checked at home daily.: yes  Monitoring Method:: personal continuous glucose monitor  Personal CGM type:: Dexcom G6 - GMI 8.1. Pt denies device or site placement issues.  Home Blood Glucose Monitoring Skills Assessment Completed: : Yes  Assessment indicates:: Adequate understanding  Area of need?: No    Acute Complications  Have you ever had hypoglycemia (low BG 70 or less)?:  (Pt denies symptoms.)  Have you ever had hyperglycemia (high  or more)?:  (Pt states polydipsia, blurred vision.)  Acute Complications Skills Assessment Completed: : Yes  Assessment indicates:: Adequate understanding  Area of need?: No    Assessment Summary and Plan  Based on  today's diabetes care assessment, the following areas of need were identified:      Identified Areas of Need      Medication/Current Diabetes Treatment: Yes - see care plan   Nutrition/Healthy Eating: Yes - see care plan   Physical Activity/Exercise: Deferred    Home Blood Glucose Monitoring: No    Acute Complications: No      Today's interventions were provided through individual discussion, instruction, and written materials were provided.    Patient verbalized understanding of instruction and written materials.  Pt was able to return back demonstration of instructions today. Patient understood key points, needs reinforcement and further instruction.     Diabetes Self-Management Care Plan:  Today's Diabetes Self-Management Care Plan was developed with Yolanda's input. Yolanda has agreed to work toward the following goal(s) to improve his/her overall diabetes control.      Care Plan: Diabetes Management   Updates made since 1/28/2024 12:00 AM        Problem: Medications         Goal: Patient Agrees to take Diabetes Medication(s) as prescribed.    Start Date: 2/6/2024   Expected End Date: 2/5/2025   This Visit's Progress: No change   Recent Progress: On track   Priority: High   Barriers: Other (comments)   Note:    Encouraged consistency of using OP5 Pods. Discussed best to use Pods in automated mode, but reminded that Pod can be used in manual mode with glucometer testing if not connected to DexG6.    To support Pod site rotation, reviewed skin care tips and products to help remove Pod adhesive.     Again, requested pt keep food records for a few days before appts to assist w/ report reviews and history. When using pump backup plan, requested pt write bolus insulin dose beside meals/snacks. Encouraged consistency of entering bolus dose 10-15min before meals/snacks.    Reviewed carb gram ctg using food lists and models and importance of accuracy related to bolus dose estimation. Sent pt written material via  [employer].    Reviewed Lantus basal insulin action and need to maintain consistent dose timing. Pt agrees to dose Lantus early am since she is typically awake during this time. Reminded to hold Lantus 24 hrs prior to restarting OP5 Pod.     Encouraged follow-up with provider Abner for diabetes medical mgmt.     Problem: Healthy Eating         Goal: Eat 5 small meals daily to assist carb management and adequate nutrition intake.    Start Date: 2/6/2024   Expected End Date: 2/5/2025   This Visit's Progress: No change   Recent Progress: No change   Priority: Medium   Barriers: Other (comments)   Note:    Pt with difficulty providing food recalls, frequency. Encouraged her to start food journal. Today, focused more on carb gram ctg to support bolus dose accuracy.     Follow Up Plan  Follow up in about 4 weeks (around 2/24/2025).  -review OP5 reports  -eval goals    Today's care plan and follow up schedule was discussed with patient.  Yolanda verbalized understanding of the care plan, goals, and agrees to follow up plan.        The patient was encouraged to communicate with his/her health care provider/physician and care team regarding his/her condition(s) and treatment.  I provided the patient with my contact information today and encouraged to contact me via phone or [facility] Patient Portal as needed.     Length of Visit   Total Time: 60 Minutes

## 2025-01-27 NOTE — Clinical Note
Bebeto Johnson, I met w/ Ms Yolanda today. She states Pod failure issues related to Dexcom communication. Tried to assist w/ troubleshooting but unclear why she's not able to use in manual mode. She does appear to know how to change into manual mode. She was able to contact [employer] customer support, switch to pump backup but still inconsistent with basal and bolus doses. Reviewed carb gram ctg today as she's having more trouble with remembering sources and how to estimate.  Next visit w/ you is 2/19. Let me know your thoughts. Thank you, Yara

## 2025-01-27 NOTE — TELEPHONE ENCOUNTER
----- Message from Milagros sent at 1/27/2025  8:28 AM CST -----  Contact: pt  Pt is mary for in person today but wants a virtual instead    Type:  Sooner Apoointment Request    Caller is requesting a sooner appointment.  Caller declined first available appointment listed below.  Caller will not accept being placed on the waitlist and is requesting a message be sent to doctor.  Name of Caller: pt  When is the first available appointment? Pt mary for 9:30am today and need it to be virtual  Symptoms: 2mos_OP5_eval close encounter  Would the patient rather a call back or a response via [facility]? phone  Best Call Back Number:  [phone]  Additional Information:

## 2025-01-27 NOTE — LETTER
January 27, 2025      Elizabeth Ceron, IRASEMA  [address]  [place] [address]         Patient: Yolanda Betts   MR Number: [id number]   YOB: 1970   Date of Visit: 1/27/2025       Dear Dr. Ceron:    Thank you for referring Yolanda for diabetes self-management education and support services. She has completed all components of our Diabetes Management Program. Below is a summary of her clinical outcomes and goal progress.    Patient Outcomes:    A1c Status:   Lab Results   Component Value Date    HGBA1C 9.7 (H) 11/18/2024    HGBA1C 6.6 (H) 06/27/2024    HGBA1C 6.5 (H) 11/29/2023    HGBA1C 5.6 08/08/2023       Care Plan: Diabetes Management   Updates made since 1/28/2024 12:00 AM        Problem: Medications         Goal: Patient Agrees to take Diabetes Medication(s) as prescribed.    Start Date: 2/6/2024   Expected End Date: 2/5/2025   This Visit's Progress: No change   Recent Progress: On track   Priority: High   Barriers: Other (comments)   Note:    Encouraged consistency of using OP5 Pods. Discussed best to use Pods in automated mode, but reminded that Pod can be used in manual mode with BG testing if not connected to DexG6.    Again, requested pt keep food records for a few days before appts to assist w/ report reviews. When using pump backup plan, requested pt write bolus insulin dose beside meals/snacks. Encouraged consistency of entering bolus dose 10-15min before meals/snacks.    Reviewed carb gram ctg using food lists and models. Sent pt written material via [employer]. Encouraged her to review more to improve bolus insulin dose accuracy.  Also, reviewed Lantus basal insulin action and need to maintain consistent dose timing. Pt agrees to dose Lantus early am since she is typically awake during this time.    Encouraged follow-up with provider Abner for diabetes medical mgmt.       Task: Reviewed with patient all current diabetes medications and provided basic review of the purpose, dosage,  frequency, side effects, and storage of both oral and injectable diabetes medications. Completed 2/6/2024        Task: Discussed guidelines for preventing, detecting and treating hypoglycemia and hyperglycemia and reviewed the importance of meal and medication timing with diabetes mediations for prevention of hypoglycemia and maximum drug benefit. Completed 2/6/2024        Problem: Healthy Eating         Goal: Eat 5 small meals daily to assist carb management and adequate nutrition intake.    Start Date: 2/6/2024   Expected End Date: 2/5/2025   This Visit's Progress: No change   Recent Progress: No change   Priority: Medium   Barriers: Other (comments)   Note:    Pt with difficulty providing food recalls, frequency. Encouraged her to start food journal. Today, focused more on carb gram ctg to support bolus dose accuracy.            Task: Review the importance of balancing carbohydrates with each meal using portion control techniques to count servings of carbohydrate and label reading to identify serving size and amount of total carbs per serving. Completed 6/13/2024          Follow up:   Yolanda to attend medical appointments as scheduled  Yolanda to update you on her DM education progress as needed      If you have questions, please do not hesitate to call me. I look forward to providing additional education and support as needed.    Sincerely,    Yara Sanchez, RD, CDE  Diabetes Care and

## 2025-01-28 ENCOUNTER — HOSPITAL ENCOUNTER (EMERGENCY)
Facility: HOSPITAL | Age: 55
Discharge: HOME | End: 2025-01-28
Attending: EMERGENCY MEDICINE
Payer: MEDICAID

## 2025-01-28 ENCOUNTER — OFFICE VISIT (OUTPATIENT)
Dept: OPHTHALMOLOGY | Facility: CLINIC | Age: 55
End: 2025-01-28
Payer: MEDICAID

## 2025-01-28 VITALS
HEART RATE: 104 BPM | OXYGEN SATURATION: 98 % | RESPIRATION RATE: 20 BRPM | DIASTOLIC BLOOD PRESSURE: 82 MMHG | SYSTOLIC BLOOD PRESSURE: 130 MMHG | TEMPERATURE: 99 F

## 2025-01-28 DIAGNOSIS — G40.919 BREAKTHROUGH SEIZURE: ICD-10-CM

## 2025-01-28 DIAGNOSIS — H52.13 MYOPIA WITH PRESBYOPIA, BILATERAL: ICD-10-CM

## 2025-01-28 DIAGNOSIS — H52.4 MYOPIA WITH PRESBYOPIA, BILATERAL: ICD-10-CM

## 2025-01-28 DIAGNOSIS — G40.409 MYOCLONIC SEIZURE: ICD-10-CM

## 2025-01-28 DIAGNOSIS — F25.0 SCHIZOAFFECTIVE DISORDER, BIPOLAR TYPE: Chronic | ICD-10-CM

## 2025-01-28 DIAGNOSIS — G40.909 SEIZURE DISORDER: Primary | ICD-10-CM

## 2025-01-28 DIAGNOSIS — S06.5XAA SDH (SUBDURAL HEMATOMA): ICD-10-CM

## 2025-01-28 DIAGNOSIS — H04.129 DRY EYE: Primary | ICD-10-CM

## 2025-01-28 DIAGNOSIS — H40.013 OPEN ANGLE WITH BORDERLINE FINDINGS OF BOTH EYES: ICD-10-CM

## 2025-01-28 DIAGNOSIS — H52.213 IRREGULAR ASTIGMATISM OF BOTH EYES: ICD-10-CM

## 2025-01-28 DIAGNOSIS — G44.049 CHRONIC PAROXYSMAL HEMICRANIA, NOT INTRACTABLE: ICD-10-CM

## 2025-01-28 LAB — POCT GLUCOSE: 158 MG/DL (ref 70–110)

## 2025-01-28 PROCEDURE — 99214 OFFICE O/P EST MOD 30 MIN: CPT | Mod: S$PBB,,, | Performed by: OPTOMETRIST

## 2025-01-28 PROCEDURE — 82962 GLUCOSE BLOOD TEST: CPT

## 2025-01-28 PROCEDURE — 1160F RVW MEDS BY RX/DR IN RCRD: CPT | Mod: CPTII,,, | Performed by: OPTOMETRIST

## 2025-01-28 PROCEDURE — 1159F MED LIST DOCD IN RCRD: CPT | Mod: CPTII,,, | Performed by: OPTOMETRIST

## 2025-01-28 PROCEDURE — 99214 OFFICE O/P EST MOD 30 MIN: CPT | Mod: PBBFAC | Performed by: OPTOMETRIST

## 2025-01-28 PROCEDURE — 99284 EMERGENCY DEPT VISIT MOD MDM: CPT | Mod: 25,27

## 2025-01-28 PROCEDURE — 99999 PR PBB SHADOW E&M-EST. PATIENT-LVL IV: CPT | Mod: PBBFAC,,, | Performed by: OPTOMETRIST

## 2025-01-28 PROCEDURE — 92015 DETERMINE REFRACTIVE STATE: CPT | Mod: ,,, | Performed by: OPTOMETRIST

## 2025-01-28 RX ORDER — LEVETIRACETAM 500 MG/1
750 TABLET ORAL 2 TIMES DAILY
Qty: 90 TABLET | Refills: 0 | Status: SHIPPED | OUTPATIENT
Start: 2025-01-28 | End: 2025-01-28

## 2025-01-28 RX ORDER — LEVETIRACETAM 500 MG/1
750 TABLET ORAL 2 TIMES DAILY
Qty: 90 TABLET | Refills: 0 | Status: SHIPPED | OUTPATIENT
Start: 2025-01-28 | End: 2026-01-28

## 2025-01-28 RX ORDER — LAMOTRIGINE 100 MG/1
100 TABLET ORAL EVERY MORNING
Qty: 30 TABLET | Refills: 2 | Status: SHIPPED | OUTPATIENT
Start: 2025-01-28 | End: 2025-01-28

## 2025-01-28 RX ORDER — LAMOTRIGINE 100 MG/1
100 TABLET ORAL EVERY MORNING
Qty: 30 TABLET | Refills: 2 | Status: SHIPPED | OUTPATIENT
Start: 2025-01-28 | End: 2025-04-28

## 2025-01-28 NOTE — PROGRESS NOTES
HPI     Follow-up            Comments:   RTC 6 months for repeat nigel   Pt states distance and reading has been blurry  Sees same floaters and flashes           Last edited by Linda Smith on 1/28/2025  3:21 PM.            Assessment /Plan     For exam results, see Encounter Report.    Dry eye  Doing well with xiidra bid OU  Continue current tx    SDH (subdural hematoma)  Myoclonic seizure  Established with neuro  Will review VF at next visit for neuro defects    Open angle with borderline findings of both eyes  Suspect based on elevated IOP today OD>OS  Follow up 2 months with VF, IOP and DFE    Irregular astigmatism of both eyes  Myopia with presbyopia, bilateral  Stable Nigel today OD, OS compared to previous 2024 scan    Eyeglass Final Rx       Eyeglass Final Rx         Sphere Cylinder Axis Add    Right -3.00 +1.25 090 +2.50    Left -3.50 +0.75 050 +2.50      Expiration Date: 1/28/2026                    RTC 2 months for 24-2VF, gOCT and dilated exam or PRN  Discussed above and all questions were answered.     Pt had seizure at check out desk  Rapid response was called  EMS came to take her to hospital

## 2025-01-28 NOTE — TELEPHONE ENCOUNTER
Called to inform pt to report to the urgent care. Pt voiced that she did not go and is over the vomiting pt has questions for the provider. Pt advised to follow up with scheduled appointment. Pt appointment is scheduled for 02/18/2025 at 8:40 am. Pt voiced understanding.

## 2025-01-29 ENCOUNTER — PATIENT MESSAGE (OUTPATIENT)
Dept: DIABETES | Facility: CLINIC | Age: 55
End: 2025-01-29
Payer: MEDICAID

## 2025-01-29 NOTE — ED PROVIDER NOTES
SCRIBE #1 NOTE: I, Patel Romo, am scribing for, and in the presence of, Bette Guevara MD. I have scribed the entire note.       History     Chief Complaint   Patient presents with    Seizures     EMS reports pt. Possibly had a sz. Walking out of the Dr office. Complaining of a headache now. Denies head trauma. Pt. Was given 5mg versed IN PTA.      Review of patient's allergies indicates:   Allergen Reactions    Codeine Itching    Hydromorphone Other (See Comments)     Can't wake up for long time if taken    Slow to wake up after surgery after receiving    Aleve [naproxen sodium]      Increases BP    Ibuprofen      Increases BP    Neuromuscular blockers, steroidal Hives     some    Pregabalin Itching    Latex, natural rubber Rash    Morphine Rash     itching    Norco [hydrocodone-acetaminophen] Itching, Rash and Hallucinations    Secobarbital sodium Rash     itching    Tylox [oxycodone-acetaminophen] Rash         History of Present Illness     HPI    1/28/2025, 7:06 PM  History obtained from the patient  Daughter at bedside      History of Present Illness: Yolanda Betts is a 54 y.o. female patient with a PMHx of anemia, DMII, GERD, HLD, HTN, obstructive sleep apnea, chronic headaches, myoclonus, schizoaffective disorder, and bipolar 1 who presents to the Emergency Department for evaluation of seizure  which onset today. Pt has been on Kepra since seizure on 11/29. Pt was diagnosed with small subdural hematoma 12/16. Pt has no neurologist. Pt is uncertain if she had any trauma from seizure. Symptoms are episodic and moderate in severity. No mitigating or exacerbating factors reported. Associated sxs include right-sided headache. Patient denies any neck or back pain, no slurred speech, no fever, numbness, visual disturbance, dizziness, and all other sxs at this time. Pt denies any recent infection. Prior Tx includes 5 mg versed PTA. No further complaints or concerns at this time.       Arrival mode:  AASI    PCP: Robe Britton MD        Past Medical History:  Past Medical History:   Diagnosis Date    Abnormal Pap smear of cervix     HPV genital warts    Anemia     Anxiety     Arthritis     Asthma 10/11/2016    Bipolar 1 disorder     Diabetes mellitus, type 2     Dyslipidemia associated with type 2 diabetes mellitus 2019    Dyspnea due to COVID-19 2024    General anesthetics causing adverse effect in therapeutic use     Genital warts     GERD (gastroesophageal reflux disease)     Herpes simplex virus (HSV) infection     Hyperlipidemia     Hypertension     Hypertension complicating diabetes 2019    Migraine with aura and without status migrainosus, not intractable 2016    Mild persistent asthma without complication 10/11/2016    Morbid obesity with body mass index (BMI) of 45.0 to 49.9 in adult 2017    Myoclonus     Obstructive sleep apnea     ALEN (obstructive sleep apnea)     2L per N/C q HS    Schizoaffective disorder, bipolar type 2019    Seasonal allergic rhinitis due to pollen 2019    Seizures     Type 2 diabetes mellitus with hyperglycemia, with long-term current use of insulin 2017    Type 2 diabetes mellitus with hyperglycemia, without long-term current use of insulin 2017       Past Surgical History:  Past Surgical History:   Procedure Laterality Date    abcess removed right back      ANGIOGRAM, CORONARY, WITH LEFT HEART CATHETERIZATION N/A 2024    Procedure: Angiogram, Coronary, with Left Heart Cath;  Surgeon: Jaimie Wills MD;  Location: [facility] CATH LAB;  Service: Cardiology;  Laterality: N/A;    BELT ABDOMINOPLASTY      BREAST SURGERY Bilateral     Reduction    CARPAL TUNNEL RELEASE Bilateral 2023    Procedure: RELEASE, CARPAL TUNNEL;  Surgeon: Neville Roth MD;  Location: [facility] OR;  Service: Orthopedics;  Laterality: Bilateral;  bilateral carpal tunnel release     SECTION      X 2    COLONOSCOPY       COLONOSCOPY N/A 2/27/2018    Procedure: colonoscopy for iron deficiency anemia;  Surgeon: Frankie Sanchez MD;  Location: [facility] ENDO;  Service: Endoscopy;  Laterality: N/A;    COLONOSCOPY N/A 2/28/2018    Procedure: COLONOSCOPY;  Surgeon: Frankie Sanchez MD;  Location: [facility] ENDO;  Service: Endoscopy;  Laterality: N/A;    COLONOSCOPY N/A 3/10/2023    Procedure: COLONOSCOPY;  Surgeon: Lalita Montes De Oca MD;  Location: [facility] ENDO;  Service: Endoscopy;  Laterality: N/A;    COLONOSCOPY N/A 4/4/2024    Procedure: COLONOSCOPY;  Surgeon: Traa Og MD;  Location: [facility] ENDO;  Service: Endoscopy;  Laterality: N/A;    EPIDURAL STEROID INJECTION INTO CERVICAL SPINE N/A 1/31/2023    Procedure: C6/7 IL ROCAEL RN IV Sedation;  Surgeon: Otto Phillips MD;  Location: [facility] PAIN MGT;  Service: Pain Management;  Laterality: N/A;    EPIDURAL STEROID INJECTION INTO CERVICAL SPINE N/A 1/30/2024    Procedure: C5/6 IL ROCAEL;  Surgeon: Otto Phillips MD;  Location: [facility] PAIN MGT;  Service: Pain Management;  Laterality: N/A;    ESOPHAGOGASTRODUODENOSCOPY N/A 3/10/2023    Procedure: EGD (ESOPHAGOGASTRODUODENOSCOPY);  Surgeon: Lalita Montes De Oca MD;  Location: [facility];  Service: Endoscopy;  Laterality: N/A;    HYSTERECTOMY      w/ BSO; hypermenorrhea    INJECTION OF ANESTHETIC AGENT AROUND MEDIAL BRANCH NERVES INNERVATING CERVICAL FACET JOINT Bilateral 12/19/2023    Procedure: Bilateral C5-7 MBB (diagnostic);  Surgeon: Otto Phillips MD;  Location: [facility] PAIN MGT;  Service: Pain Management;  Laterality: Bilateral;    MOUTH SURGERY  1996    OOPHORECTOMY      hyst/bso, hypermenorrhea    ROBOT-ASSISTED CHOLECYSTECTOMY USING DA DARI XI N/A 1/3/2020    Procedure: XI ROBOTIC CHOLECYSTECTOMY;  Surgeon: Damir Driscoll MD;  Location: [facility] OR;  Service: General;  Laterality: N/A;    TOTAL REDUCTION MAMMOPLASTY      TUBAL LIGATION           Family History:  Family History   Problem Relation Name Age of Onset    Diabetes Mother       "Hyperlipidemia Mother      Hypertension Mother      Asthma Mother      COPD Mother      Glaucoma Mother      Thyroid disease Mother      Anesthesia problems Mother          "almost had a cardiac arrest" , blood clots    Hypertension Father      Hyperlipidemia Father      Cancer Father          Brain, lung, liver, kidney    No Known Problems Sister      Hypertension Brother      Alcohol abuse Brother      Heart disease Maternal Grandmother      Hyperlipidemia Maternal Grandmother      Hypertension Maternal Grandmother      Cataracts Maternal Grandmother      Diabetes Maternal Grandmother      Heart disease Maternal Grandfather      Hyperlipidemia Maternal Grandfather      Hypertension Maternal Grandfather      Glaucoma Maternal Grandfather      Cancer Maternal Grandfather      Cataracts Maternal Grandfather      Macular degeneration Maternal Grandfather      Diabetes Maternal Grandfather      Heart disease Paternal Grandmother      Hyperlipidemia Paternal Grandmother      Hypertension Paternal Grandmother      Cataracts Paternal Grandmother      Heart disease Paternal Grandfather      Hyperlipidemia Paternal Grandfather      Hypertension Paternal Grandfather      Cataracts Paternal Grandfather      Breast cancer Maternal Cousin      Breast cancer Maternal Cousin      Breast cancer Maternal Cousin      Breast cancer Maternal Cousin         Social History:  Social History     Tobacco Use    Smoking status: Never     Passive exposure: Never    Smokeless tobacco: Never   Substance and Sexual Activity    Alcohol use: Not Currently     Comment: socially  No alcohol 72h prior to sx    Drug use: No    Sexual activity: Yes     Partners: Male     Birth control/protection: Surgical     Comment: [facility]        Review of Systems     Review of Systems   Constitutional:  Negative for fever.   HENT:  Negative for sore throat.    Eyes:  Negative for visual disturbance.   Respiratory:  Negative for shortness of breath.    Cardiovascular: "  Negative for chest pain.   Gastrointestinal:  Negative for nausea.   Genitourinary:  Negative for dysuria.   Musculoskeletal:  Negative for back pain.   Skin:  Negative for rash.   Neurological:  Positive for seizures and headaches (right-sided). Negative for dizziness, weakness and numbness.   Hematological:  Does not bruise/bleed easily.   All other systems reviewed and are negative.       Physical Exam     Initial Vitals   BP Pulse Resp Temp SpO2   01/28/25 1718 01/28/25 1718 01/28/25 1718 01/28/25 2011 01/28/25 1718   (!) 140/82 108 18 98.8 °F (37.1 °C) 98 %      MAP       --                 Physical Exam  Nursing Notes and Vital Signs Reviewed.  Constitutional: Patient is in no acute distress. Well-developed and well-nourished.  Head: Atraumatic. Normocephalic.  Eyes: PERRL. EOM intact. Conjunctivae are not pale. No scleral icterus.  ENT: Mucous membranes are moist. Oropharynx is clear and symmetric.    Neck: Supple. Full ROM. No lymphadenopathy.  Cardiovascular: Regular rate. Regular rhythm. No murmurs, rubs, or gallops. Distal pulses are 2+ and symmetric.  Pulmonary/Chest: No respiratory distress. Clear to auscultation bilaterally. No wheezing or rales.  Abdominal: Soft and non-distended.  There is no tenderness.  No rebound, guarding, or rigidity. Good bowel sounds.  Genitourinary: No CVA tenderness  Musculoskeletal: Moves all extremities. No obvious deformities. No edema. No calf tenderness.  Skin: Warm and dry.  Neurological:  Alert, awake, and appropriate.  Normal speech.  No acute focal neurological deficits are appreciated. GCS 15  Psychiatric: Normal affect. Good eye contact. Appropriate in content. Follows commands appropriately     ED Course   Procedures  ED Vital Signs:  Vitals:    01/28/25 1718 01/28/25 2011 01/28/25 2031   BP: (!) 140/82  130/82   Pulse: 108  104   Resp: 18  20   Temp:  98.8 °F (37.1 °C)    SpO2: 98%  98%       Abnormal Lab Results:  Labs Reviewed   POCT GLUCOSE - Abnormal        Result Value    POCT Glucose 158 (*)         All Lab Results:  Results for orders placed or performed during the hospital encounter of 01/28/25   POCT glucose    Collection Time: 01/28/25  8:05 PM   Result Value Ref Range    POCT Glucose 158 (H) 70 - 110 mg/dL     *Note: Due to a large number of results and/or encounters for the requested time period, some results have not been displayed. A complete set of results can be found in Results Review.         Imaging Results:  Imaging Results              CT Head Without Contrast (Final result)  Result time 01/28/25 20:10:45      Final result by Shaq Doshi MD (01/28/25 20:10:45)                   Impression:      1.  Negative for acute intracranial process. Negative for hemorrhage, or skull fracture.    2.  Stable findings as noted above.    All CT scans at this facility are performed  using dose modulation techniques as appropriate to performed exam including the following:  automated exposure control; adjustment of mA and/or kV according to the patients size (this includes techniques or standardized protocols for targeted exams where dose is matched to indication/reason for exam: i.e. extremities or head);  iterative reconstruction technique.      Electronically signed by: Shaq Doshi MD  Date:    01/28/2025  Time:    20:10               Narrative:    EXAMINATION:  CT HEAD WITHOUT CONTRAST    CLINICAL HISTORY:  Seizure, new-onset, no history of trauma;    TECHNIQUE:  Axial images through the brain and posterior fossa were obtained without the use of IV contrast.  Sagittal and coronal reconstructions are provided for review.  Motion artifact is present on a number of images.  Subtle abnormalities could be overlooked.    COMPARISON:  Studies dating back to December 18, 2024    FINDINGS:  The ventricles are midline and the CSF spaces are normal. The gray-white matter junction is well preserved. Negative for intracranial vascular abnormalities. Negative for mass,  mass effect, cerebral edema, hemorrhage or abnormal fluid collections.  Stable tiny lipoma associated with the right choroid plexus.  Falx calcifications again seen.    The skull and scalp are  intact.  Hypoplastic frontal sinuses.  Rightward nasal septal deviation.  The rest of the paranasal sinuses, mastoid air cells, middle ears and ear canals are clear. The globes are intact.                                                The Emergency Provider reviewed the vital signs and test results, which are outlined above.     ED Discussion           8:16 PM: Reassessed pt at this time. Discussed with pt all pertinent ED information and results. Discussed pt dx and plan of tx. Gave pt all f/u and return to the ED instructions. Increased pt's Kepra from 500 mg to 750 mg 2/day. All questions and concerns were addressed at this time. Pt expresses understanding of information and instructions, and is comfortable with plan to discharge. Pt is stable for discharge.    I discussed with patient and/or family/caretaker that evaluation in the ED does not suggest any emergent or life threatening medical conditions requiring immediate intervention beyond what was provided in the ED, and I believe patient is safe for discharge.  Regardless, an unremarkable evaluation in the ED does not preclude the development or presence of a serious of life threatening condition. As such, patient was instructed to return immediately for any worsening or change in current symptoms.       Medical Decision Making  DDX: 1. Breakthrough Seizure 2. ICH 3. Subtherapeutic anti-epileptic    Exam normal, accucheck normal, CT head negative, has chronic migraines per records, VSS, on low dose keppra, will increase to 750 mg BID from 500 BID, has neurology follow up scheduled, daughter at bedside, doesn't drive, seizure precautions given, no further workup indicated.     Amount and/or Complexity of Data Reviewed  External Data Reviewed: radiology and notes.      Details: Reviewed EEG from 12/01. Reviewed Notes from 11/29 seizure  Labs: ordered. Decision-making details documented in ED Course.  Radiology: ordered. Decision-making details documented in ED Course.    Risk  Prescription drug management.  Diagnosis or treatment significantly limited by social determinants of health.                ED Medication(s):  Medications - No data to display    Discharge Medication List as of 1/28/2025  8:23 PM           Follow-up Information       Rose Khan, CHRIS. Schedule an appointment as soon as possible for a visit in 2 days.    Specialty: Family Medicine  Contact information:  [address]  [place] [address]  [phone]               [facility] NEUROLOGY. Schedule an appointment as soon as possible for a visit in 2 days.    Specialty: Neurology  Contact information:  [address] [facility] [address] [phone]                               Scribe Attestation:   Scribe #1: I performed the above scribed service and the documentation accurately describes the services I performed. I attest to the accuracy of the note.     Attending:   Physician Attestation Statement for Scribe #1: I, Bette Guevara MD, personally performed the services described in this documentation, as scribed by Patel Romo, in my presence, and it is both accurate and complete.           Clinical Impression       ICD-10-CM ICD-9-CM   1. Seizure disorder  G40.909 345.90   2. Breakthrough seizure  G40.919 345.91   3. Myoclonic seizure  G40.409 345.10   4. Schizoaffective disorder, bipolar type  F25.0 295.70   5. Chronic paroxysmal hemicrania, not intractable  G44.049 339.04       Disposition:   Disposition: Discharged  Condition: Stable        Bette Guevara MD  02/02/25 0929

## 2025-01-29 NOTE — ED NOTES
Bed: 19  Expected date: 1/28/25  Expected time: 5:07 PM  Means of arrival: Ambulance Service  Comments:

## 2025-02-03 NOTE — PROGRESS NOTES
"Neurosurgery  Established Patient    SUBJECTIVE:     History of Present Illness: Yolanda Betts is a 54 y.o. female who presents for follow up for subdural hematoma. She originally presented to the ED on 11/30 with reported full body tremors and headaches. She was found to have a thin acute appearing right sided subdural hematoma. She was admitted and SDH was stable on repeat imaging. She was monitored on EEG with no seizures identified. She was discharged home with NSGY and Neurology follow up.    Patient is here today and reports persistent headache for which she was recently seen in the ED on 12/18. CTH showed resolved SDH. Headache occurs on the right side of her head. They are daily and are associated with "tunnel vision". She has a history of migraines that stopped about  2 years ago, but these new headaches started about 3-4 months ago. She endorses memory issues as well. She states she has poor vision at baseline with cataracts and glaucoma for which she is pending surgery. The whole body tremors have occurred 3 times. When these episodes happen she is conscious and it is convulsions of her entire body, including her head. She has no prior history of seizure.     Interval history 1/16/25:  Patient presents via virtual visit for review of MRI brain w wo. Patient denies any further body tremors or seizure like events. Her headaches are stable.      Review of patient's allergies indicates:   Allergen Reactions    Codeine Itching    Hydromorphone Other (See Comments)     Can't wake up for long time if taken    Slow to wake up after surgery after receiving    Aleve [naproxen sodium]      Increases BP    Ibuprofen      Increases BP    Neuromuscular blockers, steroidal Hives     some    Pregabalin Itching    Latex, natural rubber Rash    Morphine Rash     itching    Norco [hydrocodone-acetaminophen] Itching, Rash and Hallucinations    Secobarbital sodium Rash     itching    Tylox [oxycodone-acetaminophen] Rash " "      Current Outpatient Medications   Medication Sig Dispense Refill    albuterol (PROVENTIL) 2.5 mg /3 mL (0.083 %) nebulizer solution Take 2.5 mg by nebulization every 4 (four) hours as needed. Times a day 75 mL 1    albuterol (PROVENTIL/VENTOLIN HFA) 90 mcg/actuation inhaler Inhale 2 puffs into the lungs every 6 (six) hours as needed for Wheezing. 8.5 g 1    ALPRAZolam (XANAX) 1 MG tablet Take 1 tablet (1 mg total) by mouth 2 (two) times daily as needed for Anxiety. 60 tablet 2    amLODIPine (NORVASC) 5 MG tablet Take 1 tablet (5 mg total) by mouth once daily. 30 tablet 11    atorvastatin (LIPITOR) 20 MG tablet Take 1 tablet (20 mg total) by mouth every evening 90 tablet 3    BD INSULIN SYRINGE ULTRA-FINE 1/2 mL 30 gauge x 1/2" Syrg   0    blood-glucose meter,continuous (DEXCOM G7 ) Misc 1 each by Misc.(Non-Drug; Combo Route) route once. for 1 dose 1 each 0    blood-glucose sensor (DEXCOM G6 SENSOR) Mariela change sensor every 10 days. 3 each 11    blood-glucose transmitter (DEXCOM G6 TRANSMITTER) Mariela 1 each by Misc.(Non-Drug; Combo Route) route every 3 (three) months. 1 each 3    cevimeline (EVOXAC) 30 mg capsule Take 1 capsule by mouth in the morning and 1 capsule at noon and 1 capsule before bedtime. Do all this for 30 days. 90 capsule 0    cholecalciferol, vitamin D3, 1,250 mcg (50,000 unit) Tab Take 1 capsule by mouth every 7 days. 12 tablet 3    cyclobenzaprine (FLEXERIL) 10 MG tablet Take 1 tablet (10 mg total) by mouth 3 (three) times daily as needed (Pain). 90 tablet 11    DULoxetine (CYMBALTA) 60 MG capsule Take 1 capsule by mouth 2 times daily 180 capsule 3    ergocalciferol (ERGOCALCIFEROL) 50,000 unit Cap Take 1 capsule (50,000 Units total) by mouth every 7 days. 4 capsule 6    estradioL (ESTRACE) 0.01 % (0.1 mg/gram) vaginal cream Place 1 g vaginally twice a week. 42.5 g 3    fenofibrate (TRICOR) 145 MG tablet Take 1 tablet by mouth in the morning. 30 tablet 5    fluticasone propionate " "(FLONASE) 50 mcg/actuation nasal spray Take 1 spray by Each Nostril route in the morning. 16 g 3    furosemide (LASIX) 20 MG tablet Take 1 tablet (20 mg total) by mouth once daily. 90 tablet 3    glucagon (BAQSIMI) 3 mg/actuation [place] 1 spray by Nasal route as needed (hypoglycemia). For emergency use. May repeat 1 time after 15 minutes 2 each 1    glucagon (BAQSIMI) 3 mg/actuation [place] SPRAY 1 SPRAY IN NOSTRIL AS NEEDED FOR EMERGENCY. MAY REPEAT 1 TIME AFTER 15 MINUTES 2 each 1    insulin aspart U-100 (NOVOLOG FLEXPEN U-100 INSULIN) 100 unit/mL (3 mL) InPn pen Inject up to 10 units under the skin per sliding scale 3 times a day before meals 15 mL 3    insulin aspart U-100 (NOVOLOG U-100 INSULIN ASPART) 100 unit/mL injection To use via insulin pump. Up to 200 units every 3 days 20 mL 5    insulin aspart U-100 (NOVOLOG) 100 unit/mL injection NovoLOG FlexPen      insulin glargine U-100, Lantus, (LANTUS SOLOSTAR U-100 INSULIN) 100 unit/mL (3 mL) InPn pen Inject up to 50 units daily in event of pump failure 15 mL 3    insulin pump cart,auto,BT,G6/7 (OMNIPOD 5 G6-G7 PODS, GEN 5,) Crtg 1 each by Misc.(Non-Drug; Combo Route) route every 48 hours. 45 each 3    insulin syringe-needle U-100 0.3 mL 30 gauge x 5/16" Syrg 1 each by Misc.(Non-Drug; Combo Route) route Every 3 (three) days. 100 each 2    lamoTRIgine (LAMICTAL) 100 MG tablet Take 1 tablet (100 mg total) by mouth every morning. 30 tablet 2    levETIRAcetam (KEPPRA) 500 MG Tab Take 1.5 tablets (750 mg total) by mouth 2 (two) times daily. 90 tablet 0    levocetirizine (XYZAL) 5 MG tablet Take 1 tablet (5 mg total) by mouth every evening. 30 tablet 11    LIDOcaine (LIDODERM) 5 % Place 2 patches onto the skin every 12 (twelve) hours as needed (pain). Remove & Discard patch within 12 hours or as directed by MD 60 patch 11    lifitegrast (XIIDRA) 5 % Dpet Place 1 drop into both eyes 2 (two) times daily. 60 each 12    LORazepam (ATIVAN) 1 MG tablet Take 0.5 tablets (0.5 mg " "total) by mouth every 6 (six) hours as needed for anxiety 2 tablet 0    magnesium hydroxide 400 mg/5 ml (MILK OF MAGNESIA) 400 mg/5 mL Susp Take 5 mLs by mouth every 4 (four) hours as needed.      magnesium oxide (MAG-OX) 400 mg (241.3 mg magnesium) tablet Take 1 tablet (400 mg total) by mouth once daily. 90 tablet 3    metoprolol succinate (TOPROL-XL) 50 MG 24 hr tablet Take 1 tablet (50 mg total) by mouth every morning. 30 tablet 11    nebulizer and compressor (COMP-AIR NEBULIZER COMPRESSOR) Mariela use as directed 1 each 0    omeprazole (PRILOSEC) 40 MG capsule Take 1 capsule (40 mg total) by mouth once daily. 90 capsule 1    ondansetron (ZOFRAN-ODT) 4 MG TbDL Take 1 tablet (4 mg total) by mouth every 8 (eight) hours as needed. 12 tablet 0    OZEMPIC 2 mg/dose (8 mg/3 mL) PnIj Inject 2 mg into the skin every 7 days.      pen needle, diabetic (BD ULTRA-FINE MINI PEN NEEDLE) 31 gauge x 3/16" Ndle Use 1 a day in event of pump failure 100 each 11    polyethylene glycol (GLYCOLAX) 17 gram/dose powder Take 17 g by mouth 2 (two) times daily. 1020 g 11    promethazine (PHENERGAN) 25 MG tablet Take 25 mg by mouth every 6 (six) hours as needed.      secukinumab (COSENTYX PEN, 2 PENS,) 150 mg/mL PnIj Inject 300 mg into the skin every 14 (fourteen) days. 4 mL 11    semaglutide (OZEMPIC) 2 mg/dose (8 mg/3 mL) PnIj Inject 2 mg under the skin every 7 days. 3 mL 1    semaglutide (OZEMPIC) 2 mg/dose (8 mg/3 mL) PnIj Inject 2 mg into the skin every 7 days. 3 mL 2    spironolactone (ALDACTONE) 25 MG tablet Take 1 tablet by mouth in the morning. 90 tablet 1    SYMBICORT 160-4.5 mcg/actuation HFAA Inhale 2 puffs into the lungs in the morning and 2 puffs before bedtime. 2 puffs every 12 hours 10.2 g 2    triamcinolone acetonide 0.1% (KENALOG) 0.1 % paste Apply coating 2 times daily 5 g 12    zolpidem (AMBIEN CR) 6.25 MG CR tablet Take 1 tablet (6.25 mg total) by mouth nightly as needed for Insomnia. 30 tablet 2     No current " facility-administered medications for this visit.       Past Medical History:   Diagnosis Date    Abnormal Pap smear of cervix     HPV genital warts    Anemia     Anxiety     Arthritis     Asthma 10/11/2016    Bipolar 1 disorder     Diabetes mellitus, type 2     Dyslipidemia associated with type 2 diabetes mellitus 2019    Dyspnea due to COVID-19 2024    General anesthetics causing adverse effect in therapeutic use     Genital warts     GERD (gastroesophageal reflux disease)     Herpes simplex virus (HSV) infection     Hyperlipidemia     Hypertension     Hypertension complicating diabetes 2019    Migraine with aura and without status migrainosus, not intractable 2016    Mild persistent asthma without complication 10/11/2016    Morbid obesity with body mass index (BMI) of 45.0 to 49.9 in adult 2017    Myoclonus     Obstructive sleep apnea     ALEN (obstructive sleep apnea)     2L per N/C q HS    Schizoaffective disorder, bipolar type 2019    Seasonal allergic rhinitis due to pollen 2019    Seizures     Type 2 diabetes mellitus with hyperglycemia, with long-term current use of insulin 2017    Type 2 diabetes mellitus with hyperglycemia, without long-term current use of insulin 2017     Past Surgical History:   Procedure Laterality Date    abcess removed right back      ANGIOGRAM, CORONARY, WITH LEFT HEART CATHETERIZATION N/A 2024    Procedure: Angiogram, Coronary, with Left Heart Cath;  Surgeon: Jaimie Wills MD;  Location: [facility] CATH LAB;  Service: Cardiology;  Laterality: N/A;    BELT ABDOMINOPLASTY      BREAST SURGERY Bilateral     Reduction    CARPAL TUNNEL RELEASE Bilateral 2023    Procedure: RELEASE, CARPAL TUNNEL;  Surgeon: Neville Roth MD;  Location: [facility] OR;  Service: Orthopedics;  Laterality: Bilateral;  bilateral carpal tunnel release     SECTION      X 2    COLONOSCOPY      COLONOSCOPY N/A 2018    Procedure:  colonoscopy for iron deficiency anemia;  Surgeon: Frankie Sanchez MD;  Location: [facility] ENDO;  Service: Endoscopy;  Laterality: N/A;    COLONOSCOPY N/A 2/28/2018    Procedure: COLONOSCOPY;  Surgeon: Frankie Sanchez MD;  Location: [facility] ENDO;  Service: Endoscopy;  Laterality: N/A;    COLONOSCOPY N/A 3/10/2023    Procedure: COLONOSCOPY;  Surgeon: Lalita Montes De Oca MD;  Location: [facility] ENDO;  Service: Endoscopy;  Laterality: N/A;    COLONOSCOPY N/A 4/4/2024    Procedure: COLONOSCOPY;  Surgeon: Tara Og MD;  Location: [facility] ENDO;  Service: Endoscopy;  Laterality: N/A;    EPIDURAL STEROID INJECTION INTO CERVICAL SPINE N/A 1/31/2023    Procedure: C6/7 IL ROCAEL RN IV Sedation;  Surgeon: Otto Phillips MD;  Location: [facility] PAIN MGT;  Service: Pain Management;  Laterality: N/A;    EPIDURAL STEROID INJECTION INTO CERVICAL SPINE N/A 1/30/2024    Procedure: C5/6 IL ROCAEL;  Surgeon: Otto Phillips MD;  Location: [facility] PAIN MGT;  Service: Pain Management;  Laterality: N/A;    ESOPHAGOGASTRODUODENOSCOPY N/A 3/10/2023    Procedure: EGD (ESOPHAGOGASTRODUODENOSCOPY);  Surgeon: Lalita Montes De Oca MD;  Location: [facility];  Service: Endoscopy;  Laterality: N/A;    HYSTERECTOMY      w/ BSO; hypermenorrhea    INJECTION OF ANESTHETIC AGENT AROUND MEDIAL BRANCH NERVES INNERVATING CERVICAL FACET JOINT Bilateral 12/19/2023    Procedure: Bilateral C5-7 MBB (diagnostic);  Surgeon: Otto Phillips MD;  Location: [facility] PAIN MGT;  Service: Pain Management;  Laterality: Bilateral;    MOUTH SURGERY  1996    OOPHORECTOMY      hyst/bso, hypermenorrhea    ROBOT-ASSISTED CHOLECYSTECTOMY USING DA DARI XI N/A 1/3/2020    Procedure: XI ROBOTIC CHOLECYSTECTOMY;  Surgeon: Damir Driscoll MD;  Location: [facility] OR;  Service: General;  Laterality: N/A;    TOTAL REDUCTION MAMMOPLASTY      TUBAL LIGATION       Family History       Problem Relation (Age of Onset)    Alcohol abuse Brother    Anesthesia problems Mother    Asthma Mother    Breast  cancer Maternal Cousin, Maternal Cousin, Maternal Cousin, Maternal Cousin    COPD Mother    Cancer Father, Maternal Grandfather    Cataracts Maternal Grandmother, Maternal Grandfather, Paternal Grandmother, Paternal Grandfather    Diabetes Mother, Maternal Grandmother, Maternal Grandfather    Glaucoma Mother, Maternal Grandfather    Heart disease Maternal Grandmother, Maternal Grandfather, Paternal Grandmother, Paternal Grandfather    Hyperlipidemia Mother, Father, Maternal Grandmother, Maternal Grandfather, Paternal Grandmother, Paternal Grandfather    Hypertension Mother, Father, Brother, Maternal Grandmother, Maternal Grandfather, Paternal Grandmother, Paternal Grandfather    Macular degeneration Maternal Grandfather    No Known Problems Sister    Thyroid disease Mother          Social History     Socioeconomic History    Marital status: Single   Tobacco Use    Smoking status: Never     Passive exposure: Never    Smokeless tobacco: Never   Substance and Sexual Activity    Alcohol use: Not Currently     Comment: socially  No alcohol 72h prior to sx    Drug use: No    Sexual activity: Yes     Partners: Male     Birth control/protection: Surgical     Comment: hyst   Social History Narrative    Long-term care nurse     Social Drivers of Health     Financial Resource Strain: Medium Risk (11/30/2024)    Overall Financial Resource Strain (CARDIA)     Difficulty of Paying Living Expenses: Somewhat hard   Food Insecurity: Food Insecurity Present (11/30/2024)    Hunger Vital Sign     Worried About Running Out of Food in the Last Year: Often true     Ran Out of Food in the Last Year: Often true   Transportation Needs: No Transportation Needs (11/30/2024)    TRANSPORTATION NEEDS     Transportation : No   Physical Activity: Inactive (10/15/2024)    Exercise Vital Sign     Days of Exercise per Week: 0 days     Minutes of Exercise per Session: 0 min   Stress: Stress Concern Present (11/30/2024)    [employer] - Occupational Stress Questionnaire     Feeling of Stress : Very much   Housing Stability: High Risk (11/30/2024)    Housing Stability Vital Sign     Unable to Pay for Housing in the Last Year: Yes     Homeless in the Last Year: No       Review of Systems    OBJECTIVE:     Vital Signs     There is no height or weight on file to calculate BMI.    Neurosurgery Physical Exam    Diagnostic Results:  MRI brain w wo without any abnormal findings    ASSESSMENT/PLAN:     Yolanda Betts is a 54 y.o. female with history of SDH that has since resolved. She complains of persistent headaches and 3 episodes of whole body convulsions. MRI brain w wo without any abnormal findings. She should follow up with Neurology for headaches and further work up of her potential seizures vs pseudoseizures.

## 2025-02-06 ENCOUNTER — NURSE TRIAGE (OUTPATIENT)
Dept: ADMINISTRATIVE | Facility: CLINIC | Age: 55
End: 2025-02-06
Payer: MEDICAID

## 2025-02-06 NOTE — TELEPHONE ENCOUNTER
OOC RN  Dr. Pitt.   Patient  c/o HA and dizziness. Starting at same time for about November 29,   and 6 months before that.    Has hist of seizures  January,   Need something Thrush in mouth and appt for seizure.  Taking seizure meds.   Care dispo is to call 911 now,   patient refused 911 just wants to speak with Dr. Pitt.  Patient refused to listen to call back if,    Wants to talk to Dr. Caceres   Reason for Disposition   Seizure lasts > 5 minutes    Additional Information   Negative: First seizure ever    Protocols used: [id number]

## 2025-02-06 NOTE — TELEPHONE ENCOUNTER
I called pt back and informed her Timur Pitt NP is out of office this afternoon and will not get her message till she returns in office 8a tomorrow with verbal understanding received.

## 2025-02-06 NOTE — TELEPHONE ENCOUNTER
I spoke with pt to assist with hospital f/u appt and f/u seizures, h/a, dizziness, thrush.  Appt 1p Monday 2/10/25 scheduled.  States she has been trying to get a call back since Monday.  I do see a call today that was given to RN nurse triage (which pt states did happen), but no msgs from Monday, Tue, Wed. this week.      She would like diflucan for the mouth thrush sent to [facility].  States she would like it the same as last time she had difficulty getting rid of thrush but unsure of instructions/length of med but thinks it was for 14 days.      Staff to call pt back to let her know the outcome of prescription request.

## 2025-02-10 RX ORDER — FLUCONAZOLE 150 MG/1
150 TABLET ORAL DAILY
Qty: 1 TABLET | Refills: 1 | Status: SHIPPED | OUTPATIENT
Start: 2025-02-10 | End: 2025-02-15

## 2025-02-16 ENCOUNTER — HOSPITAL ENCOUNTER (EMERGENCY)
Facility: HOSPITAL | Age: 55
Discharge: HOME | End: 2025-02-16
Attending: EMERGENCY MEDICINE
Payer: MEDICAID

## 2025-02-16 VITALS
TEMPERATURE: 98 F | WEIGHT: 200 LBS | BODY MASS INDEX: 44.99 KG/M2 | HEART RATE: 96 BPM | OXYGEN SATURATION: 100 % | SYSTOLIC BLOOD PRESSURE: 125 MMHG | HEIGHT: 56 IN | DIASTOLIC BLOOD PRESSURE: 71 MMHG | RESPIRATION RATE: 19 BRPM

## 2025-02-16 DIAGNOSIS — E87.5 HYPERKALEMIA: ICD-10-CM

## 2025-02-16 DIAGNOSIS — R56.9 SEIZURE-LIKE ACTIVITY: Primary | ICD-10-CM

## 2025-02-16 LAB
ALBUMIN SERPL BCP-MCNC: 3.5 G/DL (ref 3.5–5.2)
ALP SERPL-CCNC: 87 U/L (ref 40–150)
ALT SERPL W/O P-5'-P-CCNC: 25 U/L (ref 10–44)
AMPHET+METHAMPHET UR QL: NEGATIVE
ANION GAP SERPL CALC-SCNC: 9 MMOL/L (ref 8–16)
AST SERPL-CCNC: 18 U/L (ref 10–40)
BACTERIA #/AREA URNS HPF: NORMAL /HPF
BARBITURATES UR QL SCN>200 NG/ML: NEGATIVE
BASOPHILS # BLD AUTO: 0.01 K/UL (ref 0–0.2)
BASOPHILS NFR BLD: 0.1 % (ref 0–1.9)
BENZODIAZ UR QL SCN>200 NG/ML: ABNORMAL
BILIRUB SERPL-MCNC: 0.2 MG/DL (ref 0.1–1)
BILIRUB UR QL STRIP: NEGATIVE
BUN SERPL-MCNC: 11 MG/DL (ref 6–20)
BZE UR QL SCN: NEGATIVE
CALCIUM SERPL-MCNC: 9.9 MG/DL (ref 8.7–10.5)
CANNABINOIDS UR QL SCN: NEGATIVE
CHLORIDE SERPL-SCNC: 103 MMOL/L (ref 95–110)
CLARITY UR: CLEAR
CO2 SERPL-SCNC: 25 MMOL/L (ref 23–29)
COLOR UR: YELLOW
CREAT SERPL-MCNC: 0.7 MG/DL (ref 0.5–1.4)
CREAT UR-MCNC: 131.2 MG/DL (ref 15–325)
DIFFERENTIAL METHOD BLD: ABNORMAL
EOSINOPHIL # BLD AUTO: 0 K/UL (ref 0–0.5)
EOSINOPHIL NFR BLD: 0.1 % (ref 0–8)
ERYTHROCYTE [DISTWIDTH] IN BLOOD BY AUTOMATED COUNT: 17.2 % (ref 11.5–14.5)
EST. GFR  (NO RACE VARIABLE): >60 ML/MIN/1.73 M^2
GLUCOSE SERPL-MCNC: 112 MG/DL (ref 70–110)
GLUCOSE UR QL STRIP: ABNORMAL
HCT VFR BLD AUTO: 32.8 % (ref 37–48.5)
HGB BLD-MCNC: 9.4 G/DL (ref 12–16)
HGB UR QL STRIP: NEGATIVE
HYALINE CASTS #/AREA URNS LPF: 0 /LPF
IMM GRANULOCYTES # BLD AUTO: 0.04 K/UL (ref 0–0.04)
IMM GRANULOCYTES NFR BLD AUTO: 0.5 % (ref 0–0.5)
KETONES UR QL STRIP: NEGATIVE
LEUKOCYTE ESTERASE UR QL STRIP: NEGATIVE
LYMPHOCYTES # BLD AUTO: 2.4 K/UL (ref 1–4.8)
LYMPHOCYTES NFR BLD: 27.4 % (ref 18–48)
MAGNESIUM SERPL-MCNC: 2 MG/DL (ref 1.6–2.6)
MCH RBC QN AUTO: 21.9 PG (ref 27–31)
MCHC RBC AUTO-ENTMCNC: 28.7 G/DL (ref 32–36)
MCV RBC AUTO: 76 FL (ref 82–98)
METHADONE UR QL SCN>300 NG/ML: NEGATIVE
MICROSCOPIC COMMENT: NORMAL
MONOCYTES # BLD AUTO: 0.6 K/UL (ref 0.3–1)
MONOCYTES NFR BLD: 6.8 % (ref 4–15)
NEUTROPHILS # BLD AUTO: 5.6 K/UL (ref 1.8–7.7)
NEUTROPHILS NFR BLD: 65.1 % (ref 38–73)
NITRITE UR QL STRIP: NEGATIVE
NRBC BLD-RTO: 0 /100 WBC
OPIATES UR QL SCN: NEGATIVE
PCP UR QL SCN>25 NG/ML: NEGATIVE
PH UR STRIP: 6 [PH] (ref 5–8)
PLATELET # BLD AUTO: 336 K/UL (ref 150–450)
PMV BLD AUTO: 9 FL (ref 9.2–12.9)
POCT GLUCOSE: 164 MG/DL (ref 70–110)
POTASSIUM SERPL-SCNC: 5.3 MMOL/L (ref 3.5–5.1)
POTASSIUM SERPL-SCNC: 5.5 MMOL/L (ref 3.5–5.1)
PROT SERPL-MCNC: 7 G/DL (ref 6–8.4)
PROT UR QL STRIP: ABNORMAL
RBC # BLD AUTO: 4.3 M/UL (ref 4–5.4)
RBC #/AREA URNS HPF: 0 /HPF (ref 0–4)
SODIUM SERPL-SCNC: 137 MMOL/L (ref 136–145)
SP GR UR STRIP: 1.02 (ref 1–1.03)
TOXICOLOGY INFORMATION: ABNORMAL
URN SPEC COLLECT METH UR: ABNORMAL
UROBILINOGEN UR STRIP-ACNC: NEGATIVE EU/DL
WBC # BLD AUTO: 8.59 K/UL (ref 3.9–12.7)
WBC #/AREA URNS HPF: 0 /HPF (ref 0–5)

## 2025-02-16 PROCEDURE — 80307 DRUG TEST PRSMV CHEM ANLYZR: CPT | Performed by: EMERGENCY MEDICINE

## 2025-02-16 PROCEDURE — 99285 EMERGENCY DEPT VISIT HI MDM: CPT | Mod: 25

## 2025-02-16 PROCEDURE — 85025 COMPLETE CBC W/AUTO DIFF WBC: CPT | Performed by: EMERGENCY MEDICINE

## 2025-02-16 PROCEDURE — 96375 TX/PRO/DX INJ NEW DRUG ADDON: CPT

## 2025-02-16 PROCEDURE — 63600175 PHARM REV CODE 636 W HCPCS: Performed by: EMERGENCY MEDICINE

## 2025-02-16 PROCEDURE — 93005 ELECTROCARDIOGRAM TRACING: CPT

## 2025-02-16 PROCEDURE — 25000003 PHARM REV CODE 250: Performed by: EMERGENCY MEDICINE

## 2025-02-16 PROCEDURE — 81000 URINALYSIS NONAUTO W/SCOPE: CPT | Mod: 59 | Performed by: EMERGENCY MEDICINE

## 2025-02-16 PROCEDURE — 83735 ASSAY OF MAGNESIUM: CPT | Performed by: EMERGENCY MEDICINE

## 2025-02-16 PROCEDURE — 84132 ASSAY OF SERUM POTASSIUM: CPT | Performed by: EMERGENCY MEDICINE

## 2025-02-16 PROCEDURE — 80053 COMPREHEN METABOLIC PANEL: CPT | Performed by: EMERGENCY MEDICINE

## 2025-02-16 PROCEDURE — 96374 THER/PROPH/DIAG INJ IV PUSH: CPT

## 2025-02-16 PROCEDURE — 96361 HYDRATE IV INFUSION ADD-ON: CPT

## 2025-02-16 PROCEDURE — 82962 GLUCOSE BLOOD TEST: CPT

## 2025-02-16 RX ORDER — LEVETIRACETAM 500 MG/5ML
750 INJECTION, SOLUTION, CONCENTRATE INTRAVENOUS
Status: COMPLETED | OUTPATIENT
Start: 2025-02-16 | End: 2025-02-16

## 2025-02-16 RX ORDER — LORAZEPAM 2 MG/ML
0.5 INJECTION INTRAMUSCULAR
Status: COMPLETED | OUTPATIENT
Start: 2025-02-16 | End: 2025-02-16

## 2025-02-16 RX ADMIN — LORAZEPAM 0.5 MG: 2 INJECTION INTRAMUSCULAR; INTRAVENOUS at 02:02

## 2025-02-16 RX ADMIN — LEVETIRACETAM 750 MG: 500 INJECTION, SOLUTION INTRAVENOUS at 02:02

## 2025-02-16 RX ADMIN — SODIUM CHLORIDE 1000 ML: 9 INJECTION, SOLUTION INTRAVENOUS at 04:02

## 2025-02-16 NOTE — ED PROVIDER NOTES
SCRIBE #1 NOTE: I, Aditya Quezada and Catalina Ramirez, am scribing for, and in the presence of, Buffy Lai DO. I have scribed the entire note.      History     Chief Complaint   Patient presents with    Seizures     EMS reports pt present with seizure activity x 20 minutes. Pt has clonic movement, without tonic movements. Pt is semi aware of what going on during the Seizure. Pt c/o 10/10 HA. Pt's first Seizure was in 11/24. Pt never seen a Nuerologist     Review of patient's allergies indicates:   Allergen Reactions    Codeine Itching    Hydromorphone Other (See Comments)     Can't wake up for long time if taken    Slow to wake up after surgery after receiving    Aleve [naproxen sodium]      Increases BP    Ibuprofen      Increases BP    Neuromuscular blockers, steroidal Hives     some    Pregabalin Itching    Latex, natural rubber Rash    Morphine Rash     itching    Norco [hydrocodone-acetaminophen] Itching, Rash and Hallucinations    Secobarbital sodium Rash     itching    Tylox [oxycodone-acetaminophen] Rash         History of Present Illness     HPI    2/16/2025, 1:16 PM  History obtained from the  EMS and patient      History of Present Illness: Yolanda Betts is a 54 y.o. female patient with a PMHx of T2DM, seizures on 11/17/24, schizoaffective disorder, obesity, asthma, HTN, HLD, GERD, bipolar 1 disorder, anemia, anxiety, and arthritis who presents to the Emergency Department for evaluation of seizures which onset gradually PTA. Per Ems, pt was at [employer] when she had an originally unwitnessed seizure that lasted between 5-10 minutes. Pt was sitting on a chair but ended up on the ground. Pt was having clonic movement, but no tonic movements. Pt was semi-aware and responded to verbal and painful stimuli from EMS. Associated sxs include nausea, photophobia, and headache. Pt is unsure of head trauma.  Patient denies any tongue biting, fever, cough, SOB, CP, rhinorrhea, sore throat, diarrhea, emesis,  abdominal pain, dysuria, urinary frequency, sleep disturbances, urinary incontinence, bowel incontinence, and all other sxs at this time. Pt reports that she came to this facility on 11/29/24 for another seizure and was dx with a subdural hematoma. 'Pt denies EtOH or illicit drug use. Pt is compliant with her Keppra 750 mg BID. Prior Tx includes 10 mg IM Versed en route which helped slow down the seizure movements. No further complaints or concerns at this time.       Arrival mode: EMS    PCP: Robe Britton MD        Past Medical History:  Past Medical History:   Diagnosis Date    Abnormal Pap smear of cervix     HPV genital warts    Anemia     Anxiety     Arthritis     Asthma 10/11/2016    Bipolar 1 disorder     Diabetes mellitus, type 2     Dyslipidemia associated with type 2 diabetes mellitus 05/13/2019    Dyspnea due to COVID-19 09/12/2024    General anesthetics causing adverse effect in therapeutic use     Genital warts     GERD (gastroesophageal reflux disease)     Herpes simplex virus (HSV) infection     Hyperlipidemia     Hypertension     Hypertension complicating diabetes 05/05/2019    Migraine with aura and without status migrainosus, not intractable 03/21/2016    Mild persistent asthma without complication 10/11/2016    Morbid obesity with body mass index (BMI) of 45.0 to 49.9 in adult 08/03/2017    Myoclonus     Obstructive sleep apnea     ALEN (obstructive sleep apnea)     2L per N/C q HS    Schizoaffective disorder, bipolar type 04/25/2019    Seasonal allergic rhinitis due to pollen 05/13/2019    Seizures     Type 2 diabetes mellitus with hyperglycemia, with long-term current use of insulin 12/21/2017    Type 2 diabetes mellitus with hyperglycemia, without long-term current use of insulin 12/21/2017       Past Surgical History:  Past Surgical History:   Procedure Laterality Date    abcess removed right back      ANGIOGRAM, CORONARY, WITH LEFT HEART CATHETERIZATION N/A 9/27/2024    Procedure:  Angiogram, Coronary, with Left Heart Cath;  Surgeon: Jaimie Wills MD;  Location: [facility] CATH LAB;  Service: Cardiology;  Laterality: N/A;    BELT ABDOMINOPLASTY      BREAST SURGERY Bilateral     Reduction    CARPAL TUNNEL RELEASE Bilateral 2023    Procedure: RELEASE, CARPAL TUNNEL;  Surgeon: Neville Roth MD;  Location: [facility] OR;  Service: Orthopedics;  Laterality: Bilateral;  bilateral carpal tunnel release     SECTION      X 2    COLONOSCOPY      COLONOSCOPY N/A 2018    Procedure: colonoscopy for iron deficiency anemia;  Surgeon: Frankie Sanchez MD;  Location: [facility];  Service: Endoscopy;  Laterality: N/A;    COLONOSCOPY N/A 2018    Procedure: COLONOSCOPY;  Surgeon: Frankie Sanchez MD;  Location: [facility];  Service: Endoscopy;  Laterality: N/A;    COLONOSCOPY N/A 3/10/2023    Procedure: COLONOSCOPY;  Surgeon: Lalita Montes De Oca MD;  Location: [facility];  Service: Endoscopy;  Laterality: N/A;    COLONOSCOPY N/A 2024    Procedure: COLONOSCOPY;  Surgeon: Tara Og MD;  Location: [facility];  Service: Endoscopy;  Laterality: N/A;    EPIDURAL STEROID INJECTION INTO CERVICAL SPINE N/A 2023    Procedure: C6/7 IL ROCAEL RN IV Sedation;  Surgeon: Otto Phillips MD;  Location: [facility] PAIN MGT;  Service: Pain Management;  Laterality: N/A;    EPIDURAL STEROID INJECTION INTO CERVICAL SPINE N/A 2024    Procedure: C5/6 IL ROCAEL;  Surgeon: Otto Phillips MD;  Location: [facility] PAIN MGT;  Service: Pain Management;  Laterality: N/A;    ESOPHAGOGASTRODUODENOSCOPY N/A 3/10/2023    Procedure: EGD (ESOPHAGOGASTRODUODENOSCOPY);  Surgeon: Lalita Montes De Oca MD;  Location: [facility];  Service: Endoscopy;  Laterality: N/A;    HYSTERECTOMY      w/ BSO; hypermenorrhea    INJECTION OF ANESTHETIC AGENT AROUND MEDIAL BRANCH NERVES INNERVATING CERVICAL FACET JOINT Bilateral 2023    Procedure: Bilateral C5-7 MBB (diagnostic);  Surgeon: Otto Phillips MD;   "Location: [facility] PAIN MGT;  Service: Pain Management;  Laterality: Bilateral;    MOUTH SURGERY  1996    OOPHORECTOMY      hyst/bso, hypermenorrhea    ROBOT-ASSISTED CHOLECYSTECTOMY USING DA DARI XI N/A 1/3/2020    Procedure: XI ROBOTIC CHOLECYSTECTOMY;  Surgeon: Damir Driscoll MD;  Location: [facility] OR;  Service: General;  Laterality: N/A;    TOTAL REDUCTION MAMMOPLASTY      TUBAL LIGATION           Family History:  Family History   Problem Relation Name Age of Onset    Diabetes Mother      Hyperlipidemia Mother      Hypertension Mother      Asthma Mother      COPD Mother      Glaucoma Mother      Thyroid disease Mother      Anesthesia problems Mother          "almost had a cardiac arrest" , blood clots    Hypertension Father      Hyperlipidemia Father      Cancer Father          Brain, lung, liver, kidney    No Known Problems Sister      Hypertension Brother      Alcohol abuse Brother      Heart disease Maternal Grandmother      Hyperlipidemia Maternal Grandmother      Hypertension Maternal Grandmother      Cataracts Maternal Grandmother      Diabetes Maternal Grandmother      Heart disease Maternal Grandfather      Hyperlipidemia Maternal Grandfather      Hypertension Maternal Grandfather      Glaucoma Maternal Grandfather      Cancer Maternal Grandfather      Cataracts Maternal Grandfather      Macular degeneration Maternal Grandfather      Diabetes Maternal Grandfather      Heart disease Paternal Grandmother      Hyperlipidemia Paternal Grandmother      Hypertension Paternal Grandmother      Cataracts Paternal Grandmother      Heart disease Paternal Grandfather      Hyperlipidemia Paternal Grandfather      Hypertension Paternal Grandfather      Cataracts Paternal Grandfather      Breast cancer Maternal Cousin      Breast cancer Maternal Cousin      Breast cancer Maternal Cousin      Breast cancer Maternal Cousin         Social History:  Social History     Tobacco Use    Smoking status: Never     Passive exposure: " Never    Smokeless tobacco: Never   Substance and Sexual Activity    Alcohol use: Not Currently     Comment: socially  No alcohol 72h prior to sx    Drug use: No    Sexual activity: Yes     Partners: Male     Birth control/protection: Surgical     Comment: hyst        Review of Systems     Review of Systems   Constitutional:  Negative for fever.   HENT:  Negative for rhinorrhea and sore throat.    Eyes:  Positive for photophobia.   Respiratory:  Negative for cough and shortness of breath.    Cardiovascular:  Negative for chest pain.   Gastrointestinal:  Positive for nausea. Negative for abdominal pain, diarrhea and vomiting.        (-) bowel incontinence    Genitourinary:  Negative for dysuria, enuresis and frequency.   Neurological:  Positive for seizures and headaches.   Psychiatric/Behavioral:  Negative for sleep disturbance.         Physical Exam     Initial Vitals [02/16/25 1302]   BP Pulse Resp Temp SpO2   (!) 143/85 100 16 98.2 °F (36.8 °C) 97 %      MAP       --          Physical Exam  Nursing Notes and Vital Signs Reviewed.  Constitutional: Patient is in no apparent distress. Well-developed and well-nourished.  Head: Atraumatic. Normocephalic.  Eyes: PERRL.Photophobia noted. No nystagmus.   ENT:  No tongue lacerations or bleeding from the mouth.    Neck: Full ROM. No midline tenderness, step-offs, or deformities.    Cardiovascular: Regular rate. Regular rhythm. No murmurs, rubs, or gallops.   Pulmonary/Chest: No respiratory distress. Clear to auscultation bilaterally. No wheezing or rales.  Abdominal: Soft and non-distended.  There is no tenderness.  No rebound, guarding, or rigidity.   Musculoskeletal: Moves all extremities. No obvious deformities. No edema. No calf tenderness.  Skin: Warm and dry.  Neurological:  Mild drowsiness noted. Normal speech.  No acute focal neurological deficits are appreciated. 5/5 strength in all extremities. No seizure-like activity on exam initially.       ED Course  "  Procedures  ED Vital Signs:  Vitals:    02/16/25 1302 02/16/25 1315 02/16/25 1500 02/16/25 1544   BP: (!) 143/85 136/71 131/73 (!) 143/69   Pulse: 100 100 99 97   Resp: 16 17 18 17   Temp: 98.2 °F (36.8 °C)      TempSrc:       SpO2: 97% 97% 98% 98%   Weight: 90.7 kg (200 lb)      Height: 4' 8" (1.422 m)       02/16/25 1600 02/16/25 1800 02/16/25 1825   BP: 132/77 130/70 125/71   Pulse: 95 92 96   Resp: 19 18 19   Temp:   98.4 °F (36.9 °C)   TempSrc:   Oral   SpO2: 98% 100% 100%   Weight:      Height:          Abnormal Lab Results:  Labs Reviewed   CBC W/ AUTO DIFFERENTIAL - Abnormal       Result Value    WBC 8.59      RBC 4.30      Hemoglobin 9.4 (*)     Hematocrit 32.8 (*)     MCV 76 (*)     MCH 21.9 (*)     MCHC 28.7 (*)     RDW 17.2 (*)     Platelets 336      MPV 9.0 (*)     Immature Granulocytes 0.5      Gran # (ANC) 5.6      Immature Grans (Abs) 0.04      Lymph # 2.4      Mono # 0.6      Eos # 0.0      Baso # 0.01      nRBC 0      Gran % 65.1      Lymph % 27.4      Mono % 6.8      Eosinophil % 0.1      Basophil % 0.1      Differential Method Automated     COMPREHENSIVE METABOLIC PANEL - Abnormal    Sodium 137      Potassium 5.5 (*)     Chloride 103      CO2 25      Glucose 112 (*)     BUN 11      Creatinine 0.7      Calcium 9.9      Total Protein 7.0      Albumin 3.5      Total Bilirubin 0.2      Alkaline Phosphatase 87      AST 18      ALT 25      eGFR >60      Anion Gap 9     DRUG SCREEN PANEL, URINE EMERGENCY - Abnormal    Benzodiazepines Presumptive Positive (*)     Methadone metabolites Negative      Cocaine (Metab.) Negative      Opiate Scrn, Ur Negative      Barbiturate Screen, Ur Negative      Amphetamine Screen, Ur Negative      THC Negative      Phencyclidine Negative      Creatinine, Urine 131.2      Toxicology Information SEE COMMENT      Narrative:     Specimen Source->Urine   URINALYSIS, REFLEX TO URINE CULTURE - Abnormal    Specimen UA Urine, Clean Catch      Color, UA Yellow      Appearance, " UA Clear      pH, UA 6.0      Specific Gravity, UA 1.020      Protein, UA 1+ (*)     Glucose, UA 2+ (*)     Ketones, UA Negative      Bilirubin (UA) Negative      Occult Blood UA Negative      Nitrite, UA Negative      Urobilinogen, UA Negative      Leukocytes, UA Negative      Narrative:     Specimen Source->Urine   POTASSIUM - Abnormal    Potassium 5.3 (*)    POCT GLUCOSE - Abnormal    POCT Glucose 164 (*)    MAGNESIUM    Magnesium 2.0     URINALYSIS MICROSCOPIC    RBC, UA 0      WBC, UA 0      Bacteria None      Hyaline Casts, UA 0      Microscopic Comment SEE COMMENT      Narrative:     Specimen Source->Urine        All Lab Results:  Results for orders placed or performed during the hospital encounter of 02/16/25   POCT glucose    Collection Time: 02/16/25  1:02 PM   Result Value Ref Range    POCT Glucose 164 (H) 70 - 110 mg/dL   CBC auto differential    Collection Time: 02/16/25  2:24 PM   Result Value Ref Range    WBC 8.59 3.90 - 12.70 K/uL    RBC 4.30 4.00 - 5.40 M/uL    Hemoglobin 9.4 (L) 12.0 - 16.0 g/dL    Hematocrit 32.8 (L) 37.0 - 48.5 %    MCV 76 (L) 82 - 98 fL    MCH 21.9 (L) 27.0 - 31.0 pg    MCHC 28.7 (L) 32.0 - 36.0 g/dL    RDW 17.2 (H) 11.5 - 14.5 %    Platelets 336 150 - 450 K/uL    MPV 9.0 (L) 9.2 - 12.9 fL    Immature Granulocytes 0.5 0.0 - 0.5 %    Gran # (ANC) 5.6 1.8 - 7.7 K/uL    Immature Grans (Abs) 0.04 0.00 - 0.04 K/uL    Lymph # 2.4 1.0 - 4.8 K/uL    Mono # 0.6 0.3 - 1.0 K/uL    Eos # 0.0 0.0 - 0.5 K/uL    Baso # 0.01 0.00 - 0.20 K/uL    nRBC 0 0 /100 WBC    Gran % 65.1 38.0 - 73.0 %    Lymph % 27.4 18.0 - 48.0 %    Mono % 6.8 4.0 - 15.0 %    Eosinophil % 0.1 0.0 - 8.0 %    Basophil % 0.1 0.0 - 1.9 %    Differential Method Automated    Comprehensive metabolic panel    Collection Time: 02/16/25  2:24 PM   Result Value Ref Range    Sodium 137 136 - 145 mmol/L    Potassium 5.5 (H) 3.5 - 5.1 mmol/L    Chloride 103 95 - 110 mmol/L    CO2 25 23 - 29 mmol/L    Glucose 112 (H) 70 - 110 mg/dL     BUN 11 6 - 20 mg/dL    Creatinine 0.7 0.5 - 1.4 mg/dL    Calcium 9.9 8.7 - 10.5 mg/dL    Total Protein 7.0 6.0 - 8.4 g/dL    Albumin 3.5 3.5 - 5.2 g/dL    Total Bilirubin 0.2 0.1 - 1.0 mg/dL    Alkaline Phosphatase 87 40 - 150 U/L    AST 18 10 - 40 U/L    ALT 25 10 - 44 U/L    eGFR >60 >60 mL/min/1.73 m^2    Anion Gap 9 8 - 16 mmol/L   Magnesium    Collection Time: 02/16/25  2:24 PM   Result Value Ref Range    Magnesium 2.0 1.6 - 2.6 mg/dL   Potassium    Collection Time: 02/16/25  3:19 PM   Result Value Ref Range    Potassium 5.3 (H) 3.5 - 5.1 mmol/L   Drug screen panel, emergency    Collection Time: 02/16/25  3:34 PM   Result Value Ref Range    Benzodiazepines Presumptive Positive (A) Negative    Methadone metabolites Negative Negative    Cocaine (Metab.) Negative Negative    Opiate Scrn, Ur Negative Negative    Barbiturate Screen, Ur Negative Negative    Amphetamine Screen, Ur Negative Negative    THC Negative Negative    Phencyclidine Negative Negative    Creatinine, Urine 131.2 15.0 - 325.0 mg/dL    Toxicology Information SEE COMMENT    Urinalysis, Reflex to Urine Culture Urine, Clean Catch    Collection Time: 02/16/25  3:34 PM    Specimen: Urine, Clean Catch   Result Value Ref Range    Specimen UA Urine, Clean Catch     Color, UA Yellow Yellow, Straw, Milagros    Appearance, UA Clear Clear    pH, UA 6.0 5.0 - 8.0    Specific Gravity, UA 1.020 1.005 - 1.030    Protein, UA 1+ (A) Negative    Glucose, UA 2+ (A) Negative    Ketones, UA Negative Negative    Bilirubin (UA) Negative Negative    Occult Blood UA Negative Negative    Nitrite, UA Negative Negative    Urobilinogen, UA Negative <2.0 EU/dL    Leukocytes, UA Negative Negative   Urinalysis Microscopic    Collection Time: 02/16/25  3:34 PM   Result Value Ref Range    RBC, UA 0 0 - 4 /hpf    WBC, UA 0 0 - 5 /hpf    Bacteria None None-Occ /hpf    Hyaline Casts, UA 0 0-1/lpf /lpf    Microscopic Comment SEE COMMENT      *Note: Due to a large number of results and/or  encounters for the requested time period, some results have not been displayed. A complete set of results can be found in Results Review.        Imaging Results:  Imaging Results              CT Head Without Contrast (Final result)  Result time 02/16/25 14:35:04      Final result by Shaq Doshi MD (02/16/25 14:35:04)                   Impression:      1.  Negative for acute intracranial process, considering there is motion artifact on a number of images and subtle abnormalities could be overlooked.  Negative for hemorrhage, or skull fracture.    2.  Stable findings as noted above.    All CT scans at this facility are performed  using dose modulation techniques as appropriate to performed exam including the following:  automated exposure control; adjustment of mA and/or kV according to the patients size (this includes techniques or standardized protocols for targeted exams where dose is matched to indication/reason for exam: i.e. extremities or head);  iterative reconstruction technique.      Electronically signed by: Shaq Doshi MD  Date:    02/16/2025  Time:    14:35               Narrative:    EXAMINATION:  CT HEAD WITHOUT CONTRAST    CLINICAL HISTORY:  Seizure;    TECHNIQUE:  Axial images through the brain and posterior fossa were obtained without the use of IV contrast.  Sagittal and coronal reconstructions are provided for review.    COMPARISON:  January 28, 2025    FINDINGS:  Motion artifact is present on a number of images.  Subtle abnormalities could be overlooked.  The ventricles are midline and the CSF spaces are normal. The gray-white matter junction is well preserved. Negative for intracranial vascular abnormalities. Negative for mass, mass effect, cerebral edema, hemorrhage or abnormal fluid collections.  Falx and arachnoid granulation calcifications.  Tiny right choroid plexus lipoma again seen.    The skull and scalp are  intact.  Hypoplastic frontal sinuses.  Rightward nasal septal deviation.   "The   paranasal sinuses, mastoid air cells, middle ears and ear canals are clear. The globes are intact.                                       The EKG was ordered, reviewed, and independently interpreted by the ED provider.  Interpretation time: 13:15  Rate: 99 BPM  Rhythm: normal sinus rhythm  Interpretation: Nonspecific T wave abnormality. No STEMI.         The Emergency Provider reviewed the vital signs and test results, which are outlined above.     ED Discussion     2:33 PM: Called to pt's room for seizure-like activity. Pt is using her upper extremities to rock herself back and forth in the bed and is saying "please lower the bed. " Eyes are closed. Keppra infusing. Activity terminated with Ativan.     4:21 PM: Reassessed pt at this time.  Discussed with pt all pertinent ED information and results. Discussed pt dx and plan of tx. Gave pt all f/u and return to the ED instructions. All questions and concerns were addressed at this time. Pt expresses understanding of information and instructions, and is comfortable with plan to discharge. Pt is stable for discharge.    I discussed with patient and/or family/caretaker that evaluation in the ED does not suggest any emergent or life threatening medical conditions requiring immediate intervention beyond what was provided in the ED, and I believe patient is safe for discharge.  Regardless, an unremarkable evaluation in the ED does not preclude the development or presence of a serious of life threatening condition. As such, patient was instructed to return immediately for any worsening or change in current symptoms.         Medical Decision Making  54-year-old female presents with seizure-like activity.  She reports compliance with her Keppra and can not identify any factors that would lower her seizure threshold such as infection, drug or alcohol use, medication noncompliance, or insomnia.  Workup reveals mild hypokalemia with normal renal function.  This was treated " with a L of IV fluids and will likely resolve given her normal renal function.  She is afebrile with no leukocytosis and no signs of urinary tract infection.  CT head is negative for intracranial hemorrhage and she has no focal neurological deficits.  Her H&H is at baseline in her urine drug screen is positive for benzos only which she received prior to arrival by EMS and in the emergency department for seizure-like activity.  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      Amount and/or Complexity of Data Reviewed  Labs: ordered. Decision-making details documented in ED Course.  Radiology: ordered. Decision-making details documented in ED Course.  ECG/medicine tests: ordered and independent interpretation performed. Decision-making details documented in ED Course.    Risk  Prescription drug management.  Risk Details: Differential diagnoses:  CVA, ICH, infection, pneumonia, urinary tract infection, renal failure, dehydration, electrolyte abnormality or metabolic cause, drug affect, hyperglycemia, hypoglycemia, drug drug interaction, psychiatric illness, seizure, arrhythmia                ED Medication(s):  Medications   levETIRAcetam injection 750 mg (750 mg Intravenous Given 2/16/25 1426)   LORazepam injection 0.5 mg (0.5 mg Intravenous Given 2/16/25 1449)   sodium chloride 0.9% bolus 1,000 mL 1,000 mL (0 mLs Intravenous Stopped 2/16/25 1822)       Discharge Medication List as of 2/16/2025  4:21 PM           Follow-up Information       Robe Britton MD In 1 day.    Specialty: Family Medicine  Contact information:  [address] [facility] [address] [phone]               [facility] - Emergency Dept..    Specialty: Emergency Medicine  Why: As needed, If symptoms worsen  Contact information:  [address] [facility] [address] [phone]                               Scribe  Attestation:   Scribe #1: I performed the above scribed service and the documentation accurately describes the services I performed. I attest to the accuracy of the note.     Attending:   Physician Attestation Statement for Scribe #1: I, Buffy Lai DO, personally performed the services described in this documentation, as scribed by Aditya Quezada and Catalina Ramirez, in my presence, and it is both accurate and complete.           Clinical Impression       ICD-10-CM ICD-9-CM   1. Seizure-like activity  R56.9 780.39   2. Hyperkalemia  E87.5 276.7       Disposition:   Disposition: Discharged  Condition: Stable        Buffy Lai DO  02/19/25 0896

## 2025-02-16 NOTE — Clinical Note
"Yolanda Betts (Keva) was seen and treated in our emergency department on 2/16/2025.  She may return to work on 02/17/2025.       If you have any questions or concerns, please don't hesitate to call.      Antonina CONTRERAS    "

## 2025-02-17 ENCOUNTER — TELEPHONE (OUTPATIENT)
Dept: PRIMARY CARE CLINIC | Facility: CLINIC | Age: 55
End: 2025-02-17
Payer: MEDICAID

## 2025-02-17 ENCOUNTER — TELEPHONE (OUTPATIENT)
Dept: CARDIOLOGY | Facility: CLINIC | Age: 55
End: 2025-02-17
Payer: MEDICAID

## 2025-02-17 DIAGNOSIS — I10 ESSENTIAL HYPERTENSION: ICD-10-CM

## 2025-02-17 NOTE — TELEPHONE ENCOUNTER
Pt called stating she went to ED she had 2 seizures yesterday pt stated her potassium is high and her ecg was abnormal she would like to know what she should do.    ----- Message from Usha sent at 2/17/2025  9:17 AM CST -----  Name of Caller:JohnnieYolanda Martin Alpesh Symptoms:she had 2 seizures went to ER, potassium was elevatedBest Call Back Number:.[phone]   Additional Information: Patient is requesting a call back regarding her ER visit. Dorothyx. EL

## 2025-02-17 NOTE — TELEPHONE ENCOUNTER
Spoke with pt and pt stated that she wanted to inform Rose that she had two seizures on yesterday.       ----- Message from Sangita sent at 2/17/2025 12:38 PM CST -----  Contact: [phone] Patient  Patient is returning a phone call.Who left a message for the patient: Solo Luna CMADoes patient know what this is regarding:  Would you like a call back, or a response through your [facility] portal?:   Call Back Please. Thank youComments:

## 2025-02-17 NOTE — TELEPHONE ENCOUNTER
Called to speak with pt about message below N/A LVM      ----- Message from Analisa sent at 2025  9:08 AM CST -----  Contact: Pt  [phone]  [email or web address] Patient is calling for Medical Advice regardin seizures yesterday hours apart and went to the ERHow long has patient had these symptoms: yesterdayPharmacy name and phone#: [facility] [place] [id number]: [phone] Fax: [phone] wants a call back or thru [facility]: Call backComments:Please advise patient replies from provider may take up to 48 hours.Please call and advise.Thank You

## 2025-02-18 ENCOUNTER — TELEPHONE (OUTPATIENT)
Dept: RHEUMATOLOGY | Facility: CLINIC | Age: 55
End: 2025-02-18
Payer: MEDICAID

## 2025-02-18 ENCOUNTER — OFFICE VISIT (OUTPATIENT)
Dept: PRIMARY CARE CLINIC | Facility: CLINIC | Age: 55
End: 2025-02-18
Payer: MEDICAID

## 2025-02-18 ENCOUNTER — OFFICE VISIT (OUTPATIENT)
Dept: NEUROLOGY | Facility: CLINIC | Age: 55
End: 2025-02-18
Payer: MEDICAID

## 2025-02-18 VITALS
WEIGHT: 207 LBS | TEMPERATURE: 99 F | HEART RATE: 96 BPM | DIASTOLIC BLOOD PRESSURE: 78 MMHG | HEIGHT: 56 IN | BODY MASS INDEX: 46.56 KG/M2 | SYSTOLIC BLOOD PRESSURE: 118 MMHG | OXYGEN SATURATION: 96 %

## 2025-02-18 VITALS — DIASTOLIC BLOOD PRESSURE: 95 MMHG | SYSTOLIC BLOOD PRESSURE: 164 MMHG | HEART RATE: 85 BPM

## 2025-02-18 DIAGNOSIS — R56.9 SEIZURE-LIKE ACTIVITY: Primary | ICD-10-CM

## 2025-02-18 DIAGNOSIS — I10 ESSENTIAL HYPERTENSION: Chronic | ICD-10-CM

## 2025-02-18 DIAGNOSIS — E11.42 DM TYPE 2 WITH DIABETIC PERIPHERAL NEUROPATHY: Primary | ICD-10-CM

## 2025-02-18 DIAGNOSIS — G40.409 MYOCLONIC SEIZURE: ICD-10-CM

## 2025-02-18 PROCEDURE — 99214 OFFICE O/P EST MOD 30 MIN: CPT | Mod: S$PBB,,, | Performed by: NURSE PRACTITIONER

## 2025-02-18 PROCEDURE — 99215 OFFICE O/P EST HI 40 MIN: CPT | Mod: PBBFAC,27,PN | Performed by: NURSE PRACTITIONER

## 2025-02-18 PROCEDURE — 99999 PR PBB SHADOW E&M-EST. PATIENT-LVL V: CPT | Mod: PBBFAC,,, | Performed by: NURSE PRACTITIONER

## 2025-02-18 PROCEDURE — 3008F BODY MASS INDEX DOCD: CPT | Mod: CPTII,,, | Performed by: NURSE PRACTITIONER

## 2025-02-18 PROCEDURE — 3078F DIAST BP <80 MM HG: CPT | Mod: CPTII,,, | Performed by: NURSE PRACTITIONER

## 2025-02-18 PROCEDURE — 1159F MED LIST DOCD IN RCRD: CPT | Mod: CPTII,,, | Performed by: NURSE PRACTITIONER

## 2025-02-18 PROCEDURE — 99212 OFFICE O/P EST SF 10 MIN: CPT | Mod: PBBFAC

## 2025-02-18 PROCEDURE — 3072F LOW RISK FOR RETINOPATHY: CPT | Mod: CPTII,,, | Performed by: NURSE PRACTITIONER

## 2025-02-18 PROCEDURE — 1160F RVW MEDS BY RX/DR IN RCRD: CPT | Mod: CPTII,,, | Performed by: NURSE PRACTITIONER

## 2025-02-18 PROCEDURE — 3074F SYST BP LT 130 MM HG: CPT | Mod: CPTII,,, | Performed by: NURSE PRACTITIONER

## 2025-02-18 NOTE — TELEPHONE ENCOUNTER
----- Message from Tia sent at 2/18/2025  9:43 AM CST -----  Who Called: PtWhat is the request in detail: Requesting call back to discuss scheduling appt. Please adviseCan the clinic reply by [facility]? Gayatri Call Back Number: [phone] Information:

## 2025-02-18 NOTE — PROGRESS NOTES
[facility] - NEUROLOGY [address] [facility], [place]    Date: 2/18/25  Patient Name: Yolanda Betts   MRN: [id number]   PCP: Robe Britton  Referring Provider: Robe Britton MD    Assessment:   Yolanda Betts is a 54 y.o. female with extensive history including migraine, diabetic neuropathy, carpal tunnel, schizoaffective disorder, bipolar 1, sdh, htn, hld, and obesity presenting for new seizures that started Nov 29, 2024. Exam notable for depressed/apathetic affect and intermittent drowsiness. I am concerned about polypharmacy and recommend reducing medication list as appropriate per pcp. History is less suspicious for epilepsy but more suggestive of non epileptic events. Recommend further characterization in the epilepsy monitoring unit. If more consistent with PNEE then will get neuropsychology involved.     Plan:     - Recommend EMU monitoring   - Continue Keppra 750 mg BID  - Counseled patient about seizure precautions   - Consider reducing medication list due to concerns of polypharmacy  - RTC after EMU within 4 months     Problem List Items Addressed This Visit    None  Visit Diagnoses         Seizure-like activity    -  Primary    Relevant Orders    EMU Monitoring            Te Manley MD    Patient note was created using MModal Dictation.  Any errors in syntax or even information may not have been identified and edited on initial review prior to signing this note.  Subjective:   Patient seen in consultation at the request of Robe Britton MD for the evaluation of seizure. A copy of this note will be sent to the referring physician.        HPI: 2/18/2024    Ms. Yolanda Betts is a 54 y.o. female with extensive history including migraine, diabetic neuropathy, carpal tunnel, schizoaffective disorder, bipolar 1, sdh, htn, hld, and obesity presenting for new seizures that started Nov 29, 2024. She was at a [employer]  "when she felt like "something is not right" and then proceeded to shake uncontrollably for under 20 minutes. She does remember the event and she did not bite her tongue or experience bowel/urinary incontinence. Roughly 2 weeks later she experienced a similar event that prompted admission to the ED and subsequent initiation of keppra 750 mg BID. Since then, she had a total of 5-6 seizures leading up to our appointment today; her last seizure was Sunday. EEG 12/20 showed right focal cortical slowing and one event captured without EEG correlate. She denies any family history of neurological disorders. Otherwise, no other associated symptoms at this time.       PAST MEDICAL HISTORY:  Past Medical History:   Diagnosis Date    Abnormal Pap smear of cervix     HPV genital warts    Anemia     Anxiety     Arthritis     Asthma 10/11/2016    Bipolar 1 disorder     Diabetes mellitus, type 2     Dyslipidemia associated with type 2 diabetes mellitus 05/13/2019    Dyspnea due to COVID-19 09/12/2024    General anesthetics causing adverse effect in therapeutic use     Genital warts     GERD (gastroesophageal reflux disease)     Herpes simplex virus (HSV) infection     Hyperlipidemia     Hypertension     Hypertension complicating diabetes 05/05/2019    Migraine with aura and without status migrainosus, not intractable 03/21/2016    Mild persistent asthma without complication 10/11/2016    Morbid obesity with body mass index (BMI) of 45.0 to 49.9 in adult 08/03/2017    Myoclonus     Obstructive sleep apnea     ALEN (obstructive sleep apnea)     2L per N/C q HS    Schizoaffective disorder, bipolar type 04/25/2019    Seasonal allergic rhinitis due to pollen 05/13/2019    Seizures     Type 2 diabetes mellitus with hyperglycemia, with long-term current use of insulin 12/21/2017    Type 2 diabetes mellitus with hyperglycemia, without long-term current use of insulin 12/21/2017       PAST SURGICAL HISTORY:  Past Surgical History:   Procedure " Laterality Date    abcess removed right back      ANGIOGRAM, CORONARY, WITH LEFT HEART CATHETERIZATION N/A 2024    Procedure: Angiogram, Coronary, with Left Heart Cath;  Surgeon: Jaimie Wills MD;  Location: [facility] CATH LAB;  Service: Cardiology;  Laterality: N/A;    BELT ABDOMINOPLASTY      BREAST SURGERY Bilateral     Reduction    CARPAL TUNNEL RELEASE Bilateral 2023    Procedure: RELEASE, CARPAL TUNNEL;  Surgeon: Neville Roth MD;  Location: [facility] OR;  Service: Orthopedics;  Laterality: Bilateral;  bilateral carpal tunnel release     SECTION      X 2    COLONOSCOPY      COLONOSCOPY N/A 2018    Procedure: colonoscopy for iron deficiency anemia;  Surgeon: Frankie Sanchez MD;  Location: [facility];  Service: Endoscopy;  Laterality: N/A;    COLONOSCOPY N/A 2018    Procedure: COLONOSCOPY;  Surgeon: Frankie Sanchez MD;  Location: [facility];  Service: Endoscopy;  Laterality: N/A;    COLONOSCOPY N/A 3/10/2023    Procedure: COLONOSCOPY;  Surgeon: Lalita Montes De Oca MD;  Location: [facility];  Service: Endoscopy;  Laterality: N/A;    COLONOSCOPY N/A 2024    Procedure: COLONOSCOPY;  Surgeon: Tara Og MD;  Location: [facility];  Service: Endoscopy;  Laterality: N/A;    EPIDURAL STEROID INJECTION INTO CERVICAL SPINE N/A 2023    Procedure: C6/7 IL ROCAEL RN IV Sedation;  Surgeon: Otto Phillips MD;  Location: [facility] PAIN MGT;  Service: Pain Management;  Laterality: N/A;    EPIDURAL STEROID INJECTION INTO CERVICAL SPINE N/A 2024    Procedure: C5/6 IL ROCAEL;  Surgeon: Otto Phillips MD;  Location: [facility] PAIN MGT;  Service: Pain Management;  Laterality: N/A;    ESOPHAGOGASTRODUODENOSCOPY N/A 3/10/2023    Procedure: EGD (ESOPHAGOGASTRODUODENOSCOPY);  Surgeon: Lalita Montes De Oca MD;  Location: [facility];  Service: Endoscopy;  Laterality: N/A;    HYSTERECTOMY      w/ BSO; hypermenorrhea    INJECTION OF ANESTHETIC AGENT AROUND MEDIAL BRANCH NERVES INNERVATING  "CERVICAL FACET JOINT Bilateral 12/19/2023    Procedure: Bilateral C5-7 MBB (diagnostic);  Surgeon: Otto Phillips MD;  Location: [facility] MGT;  Service: Pain Management;  Laterality: Bilateral;    MOUTH SURGERY  1996    OOPHORECTOMY      hyst/bso, hypermenorrhea    ROBOT-ASSISTED CHOLECYSTECTOMY USING DA DARI XI N/A 1/3/2020    Procedure: XI ROBOTIC CHOLECYSTECTOMY;  Surgeon: Damir Driscoll MD;  Location: [facility];  Service: General;  Laterality: N/A;    TOTAL REDUCTION MAMMOPLASTY      TUBAL LIGATION         CURRENT MEDS:  Current Medications[1]    ALLERGIES:  Review of patient's allergies indicates:   Allergen Reactions    Codeine Itching    Hydromorphone Other (See Comments)     Can't wake up for long time if taken    Slow to wake up after surgery after receiving    Aleve [naproxen sodium]      Increases BP    Ibuprofen      Increases BP    Neuromuscular blockers, steroidal Hives     some    Pregabalin Itching    Latex, natural rubber Rash    Morphine Rash     itching    Norco [hydrocodone-acetaminophen] Itching, Rash and Hallucinations    Secobarbital sodium Rash     itching    Tylox [oxycodone-acetaminophen] Rash       FAMILY HISTORY:  Family History   Problem Relation Name Age of Onset    Diabetes Mother      Hyperlipidemia Mother      Hypertension Mother      Asthma Mother      COPD Mother      Glaucoma Mother      Thyroid disease Mother      Anesthesia problems Mother          "almost had a cardiac arrest" , blood clots    Hypertension Father      Hyperlipidemia Father      Cancer Father          Brain, lung, liver, kidney    No Known Problems Sister      Hypertension Brother      Alcohol abuse Brother      Heart disease Maternal Grandmother      Hyperlipidemia Maternal Grandmother      Hypertension Maternal Grandmother      Cataracts Maternal Grandmother      Diabetes Maternal Grandmother      Heart disease Maternal Grandfather      Hyperlipidemia Maternal Grandfather      Hypertension Maternal " Grandfather      Glaucoma Maternal Grandfather      Cancer Maternal Grandfather      Cataracts Maternal Grandfather      Macular degeneration Maternal Grandfather      Diabetes Maternal Grandfather      Heart disease Paternal Grandmother      Hyperlipidemia Paternal Grandmother      Hypertension Paternal Grandmother      Cataracts Paternal Grandmother      Heart disease Paternal Grandfather      Hyperlipidemia Paternal Grandfather      Hypertension Paternal Grandfather      Cataracts Paternal Grandfather      Breast cancer Maternal Cousin      Breast cancer Maternal Cousin      Breast cancer Maternal Cousin      Breast cancer Maternal Cousin         SOCIAL HISTORY:  Social History[2]    Review of Systems:  12 system review of systems is negative except for the symptoms mentioned in HPI.      Objective:     Vitals:    02/18/25 1258   BP: (!) 164/95   BP Location: Left forearm   Patient Position: Sitting   Pulse: 85     General: NAD, obese, appears intermittently drowsy  Eyes: no tearing, discharge, no erythema   ENT: moist mucous membranes of the oral cavity, nares patent    Neck: Supple, full range of motion  Cardiovascular: Warm and well perfused, pulses equal and symmetrical  Lungs: Normal work of breathing, normal chest wall excursions  Skin: No rash, lesions, or breakdown on exposed skin  Psychiatry: Appears depressed and apathetic   Abdomen: soft, non tender, non distended  Extremeties: No cyanosis, clubbing or edema.    Neurological   MENTAL STATUS: Alert and oriented to person, place, and time. Attention and concentration reduced. Speech without dysarthria, able to name and repeat without difficulty. Recent and remote memory within normal limits   CRANIAL NERVES: Visual fields intact. PERRL. EOMI. Facial sensation intact. Face symmetrical. Hearing grossly intact. Full shoulder shrug bilaterally. Tongue protrudes midline   SENSORY: Sensation is intact to light touch throughout.  Joint position perception  "intact. Negative Romberg.   MOTOR: Normal bulk and tone. No pronator drift.  5/5 deltoid, biceps, triceps, interosseous, hand  bilaterally. 5/5 iliopsoas, knee extension/flexion, foot dorsi/plantarflexion bilaterally.    REFLEXES: Symmetric and 2+ throughout.   CEREBELLAR/COORDINATION/GAIT: Patient feeling dizzy when ambulating then improved. Gait steady with normal arm swing and stride length. Normal rapid alternating movements.             [1]   Current Outpatient Medications   Medication Sig Dispense Refill    albuterol (PROVENTIL) 2.5 mg /3 mL (0.083 %) nebulizer solution Take 2.5 mg by nebulization every 4 (four) hours as needed. Times a day 75 mL 1    albuterol (PROVENTIL/VENTOLIN HFA) 90 mcg/actuation inhaler Inhale 2 puffs into the lungs every 6 (six) hours as needed for Wheezing. 8.5 g 1    ALPRAZolam (XANAX) 1 MG tablet Take 1 tablet (1 mg total) by mouth 2 (two) times daily as needed for Anxiety. 60 tablet 2    amLODIPine (NORVASC) 5 MG tablet Take 1 tablet (5 mg total) by mouth once daily. 30 tablet 11    atorvastatin (LIPITOR) 20 MG tablet Take 1 tablet (20 mg total) by mouth every evening 90 tablet 3    BD INSULIN SYRINGE ULTRA-FINE 1/2 mL 30 gauge x 1/2" Syrg   0    blood-glucose meter,continuous (DEXCOM G7 ) Misc 1 each by Misc.(Non-Drug; Combo Route) route once. for 1 dose 1 each 0    blood-glucose sensor (DEXCOM G6 SENSOR) Mariela change sensor every 10 days. 3 each 11    blood-glucose transmitter (DEXCOM G6 TRANSMITTER) Mariela 1 each by Misc.(Non-Drug; Combo Route) route every 3 (three) months. 1 each 3    cevimeline (EVOXAC) 30 mg capsule Take 1 capsule by mouth in the morning and 1 capsule at noon and 1 capsule before bedtime. Do all this for 30 days. 90 capsule 0    cholecalciferol, vitamin D3, 1,250 mcg (50,000 unit) Tab Take 1 capsule by mouth every 7 days. 12 tablet 3    cyclobenzaprine (FLEXERIL) 10 MG tablet Take 1 tablet (10 mg total) by mouth 3 (three) times daily as needed " "(Pain). 90 tablet 11    DULoxetine (CYMBALTA) 60 MG capsule Take 1 capsule by mouth 2 times daily 180 capsule 3    ergocalciferol (ERGOCALCIFEROL) 50,000 unit Cap Take 1 capsule (50,000 Units total) by mouth every 7 days. 4 capsule 6    estradioL (ESTRACE) 0.01 % (0.1 mg/gram) vaginal cream Place 1 g vaginally twice a week. 42.5 g 3    fenofibrate (TRICOR) 145 MG tablet Take 1 tablet by mouth in the morning. 30 tablet 5    fluticasone propionate (FLONASE) 50 mcg/actuation nasal spray Take 1 spray by Each Nostril route in the morning. 16 g 3    furosemide (LASIX) 20 MG tablet Take 1 tablet (20 mg total) by mouth once daily. 90 tablet 3    glucagon (BAQSIMI) 3 mg/actuation Spry 1 spray by Nasal route as needed (hypoglycemia). For emergency use. May repeat 1 time after 15 minutes 2 each 1    glucagon (BAQSIMI) 3 mg/actuation Ulmer SPRAY 1 SPRAY IN NOSTRIL AS NEEDED FOR EMERGENCY. MAY REPEAT 1 TIME AFTER 15 MINUTES 2 each 1    insulin aspart U-100 (NOVOLOG FLEXPEN U-100 INSULIN) 100 unit/mL (3 mL) InPn pen Inject up to 10 units under the skin per sliding scale 3 times a day before meals 15 mL 3    insulin aspart U-100 (NOVOLOG U-100 INSULIN ASPART) 100 unit/mL injection To use via insulin pump. Up to 200 units every 3 days 20 mL 5    insulin aspart U-100 (NOVOLOG) 100 unit/mL injection NovoLOG FlexPen      insulin glargine U-100, Lantus, (LANTUS SOLOSTAR U-100 INSULIN) 100 unit/mL (3 mL) InPn pen Inject up to 50 units daily in event of pump failure 15 mL 3    insulin pump cart,auto,BT,G6/7 (OMNIPOD 5 G6-G7 PODS, GEN 5,) Crtg 1 each by Misc.(Non-Drug; Combo Route) route every 48 hours. 45 each 3    insulin syringe-needle U-100 0.3 mL 30 gauge x 5/16" Syrg 1 each by Misc.(Non-Drug; Combo Route) route Every 3 (three) days. 100 each 2    lamoTRIgine (LAMICTAL) 100 MG tablet Take 1 tablet (100 mg total) by mouth every morning. 30 tablet 2    levETIRAcetam (KEPPRA) 500 MG Tab Take 1.5 tablets (750 mg total) by mouth 2 (two) " "times daily. 90 tablet 0    levocetirizine (XYZAL) 5 MG tablet Take 1 tablet (5 mg total) by mouth every evening. 30 tablet 11    LIDOcaine (LIDODERM) 5 % Place 2 patches onto the skin every 12 (twelve) hours as needed (pain). Remove & Discard patch within 12 hours or as directed by MD 60 patch 11    lifitegrast (XIIDRA) 5 % Dpet Place 1 drop into both eyes 2 (two) times daily. 60 each 12    LORazepam (ATIVAN) 1 MG tablet Take 0.5 tablets (0.5 mg total) by mouth every 6 (six) hours as needed for anxiety 2 tablet 0    magnesium hydroxide 400 mg/5 ml (MILK OF MAGNESIA) 400 mg/5 mL Susp Take 5 mLs by mouth every 4 (four) hours as needed.      magnesium oxide (MAG-OX) 400 mg (241.3 mg magnesium) tablet Take 1 tablet (400 mg total) by mouth once daily. 90 tablet 3    metoprolol succinate (TOPROL-XL) 50 MG 24 hr tablet Take 1 tablet (50 mg total) by mouth every morning. 30 tablet 11    nebulizer and compressor (COMP-AIR NEBULIZER COMPRESSOR) Mariela use as directed 1 each 0    omeprazole (PRILOSEC) 40 MG capsule Take 1 capsule (40 mg total) by mouth once daily. 90 capsule 1    ondansetron (ZOFRAN-ODT) 4 MG TbDL Take 1 tablet (4 mg total) by mouth every 8 (eight) hours as needed. 12 tablet 0    OZEMPIC 2 mg/dose (8 mg/3 mL) PnIj Inject 2 mg into the skin every 7 days.      pen needle, diabetic (BD ULTRA-FINE MINI PEN NEEDLE) 31 gauge x 3/16" Ndle Use 1 a day in event of pump failure 100 each 11    polyethylene glycol (GLYCOLAX) 17 gram/dose powder Take 17 g by mouth 2 (two) times daily. 1020 g 11    promethazine (PHENERGAN) 25 MG tablet Take 25 mg by mouth every 6 (six) hours as needed.      secukinumab (COSENTYX PEN, 2 PENS,) 150 mg/mL PnIj Inject 300 mg into the skin every 14 (fourteen) days. 4 mL 11    semaglutide (OZEMPIC) 2 mg/dose (8 mg/3 mL) PnIj Inject 2 mg under the skin every 7 days. 3 mL 1    semaglutide (OZEMPIC) 2 mg/dose (8 mg/3 mL) PnIj Inject 2 mg into the skin every 7 days. 3 mL 2    spironolactone " (ALDACTONE) 25 MG tablet Take 1 tablet by mouth in the morning. 90 tablet 1    SYMBICORT 160-4.5 mcg/actuation HFAA Inhale 2 puffs into the lungs in the morning and 2 puffs before bedtime. 2 puffs every 12 hours 10.2 g 2    triamcinolone acetonide 0.1% (KENALOG) 0.1 % paste Apply coating 2 times daily 5 g 12    zolpidem (AMBIEN CR) 6.25 MG CR tablet Take 1 tablet (6.25 mg total) by mouth nightly as needed for Insomnia. 30 tablet 2     No current facility-administered medications for this visit.   [2]   Social History  Tobacco Use    Smoking status: Never     Passive exposure: Never    Smokeless tobacco: Never   Substance Use Topics    Alcohol use: Not Currently     Comment: socially  No alcohol 72h prior to sx    Drug use: No

## 2025-02-19 ENCOUNTER — OCHSNER VIRTUAL EMERGENCY DEPARTMENT (OUTPATIENT)
Facility: CLINIC | Age: 55
End: 2025-02-19
Payer: MEDICAID

## 2025-02-19 ENCOUNTER — OFFICE VISIT (OUTPATIENT)
Dept: DIABETES | Facility: CLINIC | Age: 55
End: 2025-02-19
Payer: MEDICAID

## 2025-02-19 ENCOUNTER — NURSE TRIAGE (OUTPATIENT)
Dept: ADMINISTRATIVE | Facility: CLINIC | Age: 55
End: 2025-02-19
Payer: MEDICAID

## 2025-02-19 DIAGNOSIS — E11.65 TYPE 2 DIABETES MELLITUS WITH HYPERGLYCEMIA, WITH LONG-TERM CURRENT USE OF INSULIN: Primary | ICD-10-CM

## 2025-02-19 DIAGNOSIS — G43.009 MIGRAINE WITHOUT AURA AND WITHOUT STATUS MIGRAINOSUS, NOT INTRACTABLE: Primary | ICD-10-CM

## 2025-02-19 DIAGNOSIS — Z79.4 TYPE 2 DIABETES MELLITUS WITH HYPERGLYCEMIA, WITH LONG-TERM CURRENT USE OF INSULIN: Primary | ICD-10-CM

## 2025-02-19 RX ORDER — AMLODIPINE BESYLATE 10 MG/1
10 TABLET ORAL DAILY
Qty: 30 TABLET | Refills: 11 | Status: SHIPPED | OUTPATIENT
Start: 2025-02-19 | End: 2026-02-19

## 2025-02-19 NOTE — PLAN OF CARE-OVED
"[facility] Emergency Department Plan of Care Note  Referral Source: Nurse On-Call                               Chief Complaint   Patient presents with    Headache     Hx migraines, now with severe migraine headache, typical of her migraines, has them daily, gradually worse today, did not take her migraine medication for this. Reportedly had a seizure 3d ago, saw neurologist yesterday, note reports concern for PNES and polypharmacy so was told to cut down on meds, but now HA worse. No fevers, no falls, no numbness/weakness. Instructed to take home migraine medication as prescribed, if HA remains severe, can go to ER for further control. Also with various complaints about possible thrush, also complaint of "ulcer". Follow up PCP tomorrow.     Recommendation: Treat in place                       Recommendation comment: PCP tomrrow for other various complaints.    Encounter Diagnosis   Name Primary?    Migraine without aura and without status migrainosus, not intractable Yes             "

## 2025-02-19 NOTE — PROGRESS NOTES
I have seen the patient, reviewed the Resident's progress note. I have personally interviewed and examined the patient at bedside and agree with the findings.       Alden Cervantes MD  Neurology  [facility] Neurology 7th Fl

## 2025-02-19 NOTE — PROGRESS NOTES
Subjective:         Patient ID: Yolanda Betts is a 54 y.o. female.  Patient's current PCP is Rose Khan DNP.       Chief Complaint: No chief complaint on file.      HPI  Yolanda Betts is a 54 y.o. Black or  female presenting for a follow up for diabetes. Patient has been diagnosed with diabetes for > 10 years.  Currently on the omnipod 5.  Since the last visit: she has seen the RD. Due to seizures she has memory issues therefore not calculating carbs correctly. She is now using Small meal - enter 30grams carb; Medium meal 60grams carb; Large meal 90grams carb.     Complications related to diabetes:  HTN, Hyperlipidemia, peripheral neuropathy; denies Pancreatitis; denies Gastroparesis; denies DKA; denies Hx/family Hx of MEN2/MTC; reports Frequent UTIs/yeast infections; Bariatric surgery 6/1/21. IBS- diarrhea/constipation    Past failed treatment include:  Jardiance- multiple yeast infections; Victoza - GI upset. GLP-1- not advised due to GI issues (per GI and DM management), Metformin- low BGs    Blood glucose testing is performed regularly with her Dexcom. Interpretation of CGMS as follows: Average Glucose: 190 mg/dl; 16 % Very High; 34% High; 50% In Range; 0% Low;  0% Very Low. - worsened, pt reports she is sleep walking/eating-taking ambien    Compliance:The patient reports medication and diet noncompliance       The patient location is: [place], [place]  The chief complaint leading to consultation is: DM follow up    Visit type: audiovisual    Face to Face time with patient: 30 minutes of total time spent on the encounter, which includes face to face time and non-face to face time preparing to see the patient (eg, review of tests), Obtaining and/or reviewing separately obtained history, Documenting clinical information in the electronic or other health record, Independently interpreting results (not separately reported) and communicating results to the  patient/family/caregiver, or Care coordination (not separately reported). Each patient to whom he or she provides medical services by telemedicine is:  (1) informed of the relationship between the physician and patient and the respective role of any other health care provider with respect to management of the patient; and (2) notified that he or she may decline to receive medical services by telemedicine and may withdraw from such care at any time.         //   , There is no height or weight on file to calculate BMI.  Her blood sugar in clinic today is:   Lab Results   Component Value Date    POCGLU 214 (A) 11/17/2024       Labs reviewed and are noted below.  Her most recent A1C is:  Lab Results   Component Value Date    HGBA1C 9.7 (H) 11/18/2024    HGBA1C 6.6 (H) 06/27/2024    HGBA1C 6.5 (H) 11/29/2023     Lab Results   Component Value Date    CPEPTIDE 4.56 02/26/2018     Lab Results   Component Value Date    GLUTAMICACID 0.00 02/26/2018     Glucose   Date Value Ref Range Status   02/16/2025 112 (H) 70 - 110 mg/dL Final     Anion Gap   Date Value Ref Range Status   02/16/2025 9 8 - 16 mmol/L Final     eGFR if    Date Value Ref Range Status   03/08/2022 >60.0 >60 mL/min/1.73 m^2 Final     eGFR if non    Date Value Ref Range Status   03/08/2022 >60.0 >60 mL/min/1.73 m^2 Final     Comment:     Calculation used to obtain the estimated glomerular filtration  rate (eGFR) is the CKD-EPI equation.          CURRENT DM MEDICATIONS:   Diabetes Medications               glucagon (BAQSIMI) 3 mg/actuation Spry 1 spray by Nasal route as needed (hypoglycemia). For emergency use. May repeat 1 time after 15 minutes    insulin aspart U-100 (NOVOLOG FLEXPEN U-100 INSULIN) 100 unit/mL (3 mL) InPn pen Inject 24 Units into the skin 3 (three) times daily before meals. Use per sliding scale for breakfast and dinner    insulin aspart U-100 (NOVOLOG U-100 INSULIN ASPART) 100 unit/mL injection Inject 100 Units  into the skin Every 3 (three) days. To use via insulin pump                   Diabetes Management Status    Statin: Taking  ACE/ARB: Taking    Screening or Prevention Patient's value Goal Complete/Controlled?   HgA1C Testing and Control   Lab Results   Component Value Date    HGBA1C 9.7 (H) 11/18/2024      Annually/Less than 8% Yes   Lipid profile : 11/30/2024 Annually Yes   LDL control Lab Results   Component Value Date    LDLCALC 91.6 11/30/2024    Annually/Less than 100 mg/dl  Yes   Nephropathy screening Lab Results   Component Value Date    LABMICR 10.0 12/17/2024     Lab Results   Component Value Date    PROTEINUA 1+ (A) 02/16/2025    Annually Yes   Blood pressure BP Readings from Last 1 Encounters:   02/18/25 (!) 164/95    Less than 140/90 Yes   Dilated retinal exam : 03/08/2024 Annually Yes   Foot exam   : 01/03/2025 Annually Yes     Review of Systems   Constitutional:  Positive for appetite change (decreased). Negative for activity change.   Gastrointestinal:  Positive for constipation and diarrhea. Negative for abdominal pain, nausea and vomiting.        Hx of IBS, GERD   Endocrine: Positive for polydipsia. Negative for polyphagia and polyuria.   Musculoskeletal:  Positive for arthralgias.   Neurological:  Negative for syncope and weakness.        Neuropathy   Psychiatric/Behavioral:  Negative for confusion.         Depression         Objective:      Physical Exam  Constitutional:       General: She is not in acute distress.     Appearance: She is not ill-appearing, toxic-appearing or diaphoretic.   Neurological:      Mental Status: She is alert and oriented to person, place, and time.   Psychiatric:         Mood and Affect: Mood normal.         Assessment:       1. Type 2 diabetes mellitus with hyperglycemia, with long-term current use of insulin              Plan:   Type 2 diabetes mellitus with hyperglycemia, with long-term current use of insulin  -     Hemoglobin A1C; Future; Expected date:  02/19/2025          OP5 Novolog  Basal 0.5 Units/hr  Insulin: Carb Ratios 10 g/Unit  Sensitivity (ISF, Correction) 35 mg/dL  BG Target Range 130 mg/dL (+0/-0)  BG Correction Threshold 130 mg/dL         PLAN:   - Condition: uncontrolled    - Monitor blood glucose 4x daily. Goals reviewed  - She is working with the RD  - BP and LDL- Recommended lifestyle modifications for management. Encouraged healthy low fat, low carb diet and increase physical activity  - Pump Changes: change BG target to 110 to be slightly more aggressive- she will see her MD for med change (ambien)  - Continue to see the psychiatrist for depression (causing reduced appetite); discussed coping techniques  - The patient was explained the above plan and given opportunity to ask questions.  She understands, chooses and consents to this plan and accepts all the risks, which include but are not limited to the risks mentioned above.   - Labs ordered as above  - Nurse visit:  deferred    - Follow up: 1 month         For pump failure:    Use Novolog Insulin Carb ratio 1:15     Lantus 14 units once daily    Correction scale (for steroid use) :  Novolog every 3 hours using correction scale outside of mealtime.  -200: 2 units  -250: 4 units  -300: 6 units  -350: 8 units  BG greater than 350: 10 units      A total of 30 minutes was spent in face to face time, of which over 50% was spent in counseling patient on disease process, complications, treatment, and side effects of medications.

## 2025-02-19 NOTE — Clinical Note
A1c on 2/24  Keep 1 month follow up with me - add another in 2 months Dexcom and omnipod  [Follow-up Visit ___] : a follow-up visit  for [unfilled]

## 2025-02-19 NOTE — TELEPHONE ENCOUNTER
"I have this pt calling with c.o severe headache and it 6 months ago and has a hx of migraine and she said that today it is severe. Pt usually takes frova for migraines and she said that she just had a seizure on Sunday as well. Pt hasn't taken any medication to see if it would help because was told when she had the seizure not to take anything. Pt also saying that she have thrush in mouth, Pt also c/o ulcers as well.   Sent to [facility] and MD advised am appt with PCP and to take her migraine meds and if not any better to go to the ED. Pt told this and PCP can tx thrush in the am. Pt said that she wakes up with headache and goes to bed with headache and pt will call us back if any other questions or concerns               Reason for Disposition   [1] SEVERE headache (e.g., excruciating) AND [2] "worst headache" of life    Additional Information   Negative: Difficult to awaken or acting confused (e.g., disoriented, slurred speech)   Negative: [1] Weakness of the face, arm or leg on one side of the body AND [2] new-onset   Negative: [1] Numbness of the face, arm or leg on one side of the body AND [2] new-onset   Negative: [1] Loss of speech or garbled speech AND [2] new-onset   Negative: Passed out (e.g., fainted, lost consciousness, blacked out and was not responding)   Negative: Sounds like a life-threatening emergency to the triager   Negative: Unable to walk, or can only walk with assistance (e.g., requires support)   Negative: Stiff neck (can't touch chin to chest)   Negative: Severe pain in one eye   Negative: [1] Other family members (or people in same household) with headaches AND [2] possibility of carbon monoxide exposure    Protocols used: Headache-A-AH    "

## 2025-02-20 ENCOUNTER — TELEPHONE (OUTPATIENT)
Dept: RHEUMATOLOGY | Facility: CLINIC | Age: 55
End: 2025-02-20
Payer: MEDICAID

## 2025-02-20 ENCOUNTER — TELEPHONE (OUTPATIENT)
Dept: PRIMARY CARE CLINIC | Facility: CLINIC | Age: 55
End: 2025-02-20
Payer: MEDICAID

## 2025-02-20 ENCOUNTER — TELEPHONE (OUTPATIENT)
Dept: NEUROLOGY | Facility: CLINIC | Age: 55
End: 2025-02-20
Payer: MEDICAID

## 2025-02-20 ENCOUNTER — PATIENT OUTREACH (OUTPATIENT)
Facility: OTHER | Age: 55
End: 2025-02-20
Payer: MEDICAID

## 2025-02-20 ENCOUNTER — PATIENT MESSAGE (OUTPATIENT)
Dept: PRIMARY CARE CLINIC | Facility: CLINIC | Age: 55
End: 2025-02-20

## 2025-02-20 ENCOUNTER — OFFICE VISIT (OUTPATIENT)
Dept: PRIMARY CARE CLINIC | Facility: CLINIC | Age: 55
End: 2025-02-20
Payer: MEDICAID

## 2025-02-20 DIAGNOSIS — B37.0 THRUSH: Primary | ICD-10-CM

## 2025-02-20 DIAGNOSIS — R56.9 SEIZURE-LIKE ACTIVITY: Primary | ICD-10-CM

## 2025-02-20 RX ORDER — NYSTATIN 100000 [USP'U]/ML
4 SUSPENSION ORAL 4 TIMES DAILY
Qty: 160 ML | Refills: 0 | Status: SHIPPED | OUTPATIENT
Start: 2025-02-20 | End: 2025-02-28 | Stop reason: SDUPTHER

## 2025-02-20 RX ORDER — FLUCONAZOLE 150 MG/1
150 TABLET ORAL
Qty: 3 TABLET | Status: CANCELLED | OUTPATIENT
Start: 2025-02-20 | End: 2025-03-02

## 2025-02-20 NOTE — PROGRESS NOTES
The patient location is: [facility]  The chief complaint leading to consultation is: thrush    Visit type: audiovisual    Face to Face time with patient: 10  15 minutes of total time spent on the encounter, which includes face to face time and non-face to face time preparing to see the patient (eg, review of tests), Obtaining and/or reviewing separately obtained history, Documenting clinical information in the electronic or other health record, Independently interpreting results (not separately reported) and communicating results to the patient/family/caregiver, or Care coordination (not separately reported).         Each patient to whom he or she provides medical services by telemedicine is:  (1) informed of the relationship between the physician and patient and the respective role of any other health care provider with respect to management of the patient; and (2) notified that he or she may decline to receive medical services by telemedicine and may withdraw from such care at any time.    Subjective     Patient ID: Yolanda Betts is a 54 y.o. female.    Chief Complaint: No chief complaint on file.      HPI   54 year old female pt presents in virtual visit for oral thrush. She states its a frequent problem and reports having symptoms the last few days. She states she has taken diflucan and nystatin in the past. She is also complaining of headache that is chronic. Patient reports she saw neurologist on 2/18/2025 and they mentioned that they would not order anything at the time, but if it continues to follow  up with them. I told the patient I agree that should follow up with neurologist due to chronic concern, and multiple allergies           Review of Systems   HENT:  Positive for mouth dryness.    Respiratory:  Negative for chest tightness.    Cardiovascular:  Negative for chest pain and palpitations.   Gastrointestinal:  Negative for nausea and vomiting.   Musculoskeletal:  Positive for arthralgias.    Neurological:  Positive for headaches.        Objective   Physical exam not performed to virtual visit.     Assessment and Plan     1. Thrush    Other orders  -     Discontinue: nystatin (MYCOSTATIN) 100,000 unit/mL suspension; Take 4 mLs (400,000 Units total) by mouth 4 (four) times daily. for 10 days  Dispense: 160 mL; Refill: 0                 No follow-ups on file.

## 2025-02-20 NOTE — PROGRESS NOTES
Patient called the OOC RN on 2/19/25 for c/o headaches and thrush to mouth. Tamia Provider Dr Barrera disposition recommendation pt to see PCP. Appointment scheduled for 2/20/2025 at 8:30 AM with Chris Blankenship NP at [address], [address] [address].  Patient attended appointment. Spoke to patient, states that all her needs were met at her visit and has no additional concerns at this time.

## 2025-02-20 NOTE — TELEPHONE ENCOUNTER
Scheduled EMU 4/9/25, 11am. Aware an adult is required to accompany her for the duration including overnight. Aware she will be connected to lines to her head, chest, arm, have an IV placed; will not be able to leave the room until all equipment is removed at discharge. Instructions thoroughly discussed; mailed via [facility]. Reports using a CPAP and has been instructed to bring this with her for the admission. Also reports having a Dex Com w/ Insulin pump. I explained we will need to finger stick her at least one, possibly more, to make sure our hospital read is congruent with her dex com read before we can rely on her dex com results. If our results are not in line with her dex com we will have to maintain accuchecks with finger sticks. V/U.

## 2025-02-20 NOTE — TELEPHONE ENCOUNTER
Advice to get seen by PCP/Urgent care for treatment of thrush. Hold cosentyx until resolution of thrush.

## 2025-02-20 NOTE — TELEPHONE ENCOUNTER
Returned call to pt in regards to scheduled appointment. Pt has an upcoming appointment that has been changed to virtual 02/20/2025 at 8:30 am. Pt voiced understanding.     ----- Message from Usha sent at 2/20/2025  7:54 AM CST -----  .Type:   Appointment RequestName of Caller:.Yolanda Melanievivi Alpesh When is the first available appointment?todaySymptoms:headachesBest Call Back Number:.[phone]  Additional Information: Patient would like to change the office visit to a virtual appointment. Call the patient back to advise. Yuriy EL

## 2025-02-20 NOTE — TELEPHONE ENCOUNTER
Pt  states she has mouth sores and thrush and yeast. She would  like you to call in her some Difulcan 100mg to [facility] ([facility]). She was a pt of Dr. Anderson . She has an appointment with you on 04/17/2025 .

## 2025-02-20 NOTE — TELEPHONE ENCOUNTER
----- Message from Med Assistant Canseco sent at 2/20/2025  8:14 AM CST -----  Contact: Yolanda  Type:  Needs Medical AdviceWho Called:  Humza (please be specific):  Patient has Thrush and yeast in mouth How long has patient had these symptoms:  Would the patient rather a call back or a response via [facility]? [facility] Call Back Number: [phone] Information: Patient stated she called earlier in the week

## 2025-02-27 ENCOUNTER — PATIENT MESSAGE (OUTPATIENT)
Dept: DIABETES | Facility: CLINIC | Age: 55
End: 2025-02-27
Payer: MEDICAID

## 2025-02-27 DIAGNOSIS — E11.69 TYPE 2 DIABETES MELLITUS WITH OTHER SPECIFIED COMPLICATION, UNSPECIFIED WHETHER LONG TERM INSULIN USE: ICD-10-CM

## 2025-02-27 RX ORDER — INSULIN ASPART 100 [IU]/ML
INJECTION, SOLUTION INTRAVENOUS; SUBCUTANEOUS
Qty: 20 ML | Refills: 5 | Status: SHIPPED | OUTPATIENT
Start: 2025-02-27

## 2025-02-28 ENCOUNTER — TELEPHONE (OUTPATIENT)
Dept: DIABETES | Facility: CLINIC | Age: 55
End: 2025-02-28
Payer: MEDICAID

## 2025-02-28 ENCOUNTER — TELEPHONE (OUTPATIENT)
Dept: OTOLARYNGOLOGY | Facility: CLINIC | Age: 55
End: 2025-02-28
Payer: MEDICAID

## 2025-02-28 ENCOUNTER — TELEPHONE (OUTPATIENT)
Dept: RHEUMATOLOGY | Facility: CLINIC | Age: 55
End: 2025-02-28
Payer: MEDICAID

## 2025-02-28 DIAGNOSIS — M45.9 ANKYLOSING SPONDYLITIS, UNSPECIFIED SITE OF SPINE: Primary | ICD-10-CM

## 2025-02-28 DIAGNOSIS — M45.9 ANKYLOSING SPONDYLITIS, UNSPECIFIED SITE OF SPINE: ICD-10-CM

## 2025-02-28 DIAGNOSIS — M53.3 SCLEROSIS OF SACROILIAC JOINT: ICD-10-CM

## 2025-02-28 DIAGNOSIS — B37.0 ORAL THRUSH: Primary | ICD-10-CM

## 2025-02-28 RX ORDER — NYSTATIN 100000 [USP'U]/ML
4 SUSPENSION ORAL 4 TIMES DAILY
Qty: 160 ML | Refills: 0 | Status: SHIPPED | OUTPATIENT
Start: 2025-02-28 | End: 2025-03-10

## 2025-02-28 RX ORDER — UPADACITINIB 15 MG/1
15 TABLET, EXTENDED RELEASE ORAL DAILY
Qty: 30 TABLET | Refills: 11 | Status: SHIPPED | OUTPATIENT
Start: 2025-02-28 | End: 2025-02-28

## 2025-02-28 RX ORDER — SECUKINUMAB 150 MG/ML
300 INJECTION SUBCUTANEOUS
Qty: 4 ML | Refills: 11 | Status: SHIPPED | OUTPATIENT
Start: 2025-02-28

## 2025-02-28 NOTE — TELEPHONE ENCOUNTER
Pt c/o worsening thrush and ulcers in mouth and mouthwash is not helping. She is asking for several days of DIFLUCAN 100MG and to report that blood sugars have been high. Message routed to Elizabeth.

## 2025-02-28 NOTE — TELEPHONE ENCOUNTER
----- Message from Marcella sent at 2/28/2025  7:45 AM CST -----  Contact: [phone]  .1MEDICALADVICE Patient is calling for Medical Advice regarding:has thrash and sores in her mouth How long has patient had these symptoms:asking for diflucan 100mg Pharmacy name and phone#:[id number] [facility] [address]Snn[place]: [phone] Fax: [phone] Patient wants a call back or thru [facility]:call back Comments:Please advise patient replies from provider may take up to 48 hours.

## 2025-02-28 NOTE — TELEPHONE ENCOUNTER
Patient aware and became rude on the phone expressing concerns about the medication change.  Explained to patient that although Dr PARKER does not feel like the Cosentyx is causing her mouth issues that he agreed to change her medication to something else.  Offered patient to discuss her concerns with Tierra fontaine and she was place on the wait list to see Dr PARKER sooner.  Patient continued complaining that her medication was being changed without a discussion from her dr.  I offered to send a message to Dr PARKER to call her to discuss.  In the meantime, Dr PARKER overheard the conversation, sent my a secure chat to transfer the call to him.  Call transferred.

## 2025-02-28 NOTE — TELEPHONE ENCOUNTER
She was also advised on 2/20 to go to Urgent Care or PCP for treatment of thrush.  Patient keeps insisting that her mouth ulcers/thrush is due to Cosentyx.  She was scheduled an appt with Dr PARKER on 1/15 and no showed for that appt.    She is rescheduled for 4/17 and I placed her on the wait list in case of a cancellation.  Patient ended the call by hanging up on me.

## 2025-02-28 NOTE — TELEPHONE ENCOUNTER
----- Message from Marcella sent at 2/28/2025  7:41 AM CST -----  Contact: [phone]  .1MEDICALADVICE Patient is calling for Medical Advice regarding:has Thrash and sores in her mouth How long has patient had these symptoms:needs diflucan called in 100 mg Pharmacy name and phone#:[facility] [facility] [address]uf[place]: [phone] Fax: [phone] Patient wants a call back or thru [facility]:call back Comments:Please advise patient replies from provider may take up to 48 hours.  ----- Message -----  From: Marcella Nickerson  Sent: 2/28/2025   7:44 AM CST  To: Osmany Johnson Staff    .1MEDICALADVICE Patient is calling for Medical Advice regarding:has Thrash and sores in her mouth How long has patient had these symptoms:needs diflucan called in 100 mg Pharmacy name and phone#:[facility] [facility] [address]Zocql: [phone] Fax: [phone] Patient wants a call back or thru [facility]:call back Comments:Please advise patient replies from provider may take up to 48 hours.

## 2025-02-28 NOTE — TELEPHONE ENCOUNTER
----- Message from Milagros sent at 2/28/2025 12:52 PM CST -----  Contact: pt  Type:  Sooner Apoointment RequestCaller is requesting a sooner appointment.  Caller declined first available appointment listed below.  Caller will not accept being placed on the waitlist and is requesting a message be sent to doctor.Name of Caller: ptWhen is the first available appointment?  No solutionsSymptoms: referral from dr. Funezuld the patient rather a call back or a response via [facility]? phoneBest Call Back Number: [phone] Information:

## 2025-02-28 NOTE — PROGRESS NOTES
I called and discussed with  about her ongoing persistent oral thrush. I did a complete research and found no evidence of Cosentyx causing thrush. But her highest risk of persistent oral thrush seems to be her uncontrolled diabetes. I have refilled oral suspension, sent an ENT REFERRAL to rule out other causes of thrush and Advised her to contact her diabetes management team to adequately control her DM.

## 2025-03-01 NOTE — TELEPHONE ENCOUNTER
Pt contacted, reviewed pump and cgm. Recommended to correct bg between meals. I also notice that the previous pump changes (lower target BG to 110) was not done. She changed this today .

## 2025-03-02 ENCOUNTER — NURSE TRIAGE (OUTPATIENT)
Dept: ADMINISTRATIVE | Facility: CLINIC | Age: 55
End: 2025-03-02
Payer: MEDICAID

## 2025-03-02 ENCOUNTER — HOSPITAL ENCOUNTER (EMERGENCY)
Facility: HOSPITAL | Age: 55
Discharge: HOME | End: 2025-03-02
Attending: FAMILY MEDICINE
Payer: MEDICAID

## 2025-03-02 VITALS
HEIGHT: 56 IN | WEIGHT: 201.38 LBS | TEMPERATURE: 98 F | HEART RATE: 84 BPM | SYSTOLIC BLOOD PRESSURE: 135 MMHG | OXYGEN SATURATION: 100 % | BODY MASS INDEX: 45.3 KG/M2 | RESPIRATION RATE: 17 BRPM | DIASTOLIC BLOOD PRESSURE: 80 MMHG

## 2025-03-02 DIAGNOSIS — R73.9 HYPERGLYCEMIA: Primary | ICD-10-CM

## 2025-03-02 DIAGNOSIS — B37.0 ORAL THRUSH: ICD-10-CM

## 2025-03-02 LAB
ALBUMIN SERPL BCP-MCNC: 3.4 G/DL (ref 3.5–5.2)
ALP SERPL-CCNC: 94 U/L (ref 40–150)
ALT SERPL W/O P-5'-P-CCNC: 25 U/L (ref 10–44)
ANION GAP SERPL CALC-SCNC: 10 MMOL/L (ref 8–16)
AST SERPL-CCNC: 22 U/L (ref 10–40)
B-OH-BUTYR BLD STRIP-SCNC: 0 MMOL/L (ref 0–0.5)
BASOPHILS # BLD AUTO: 0.02 K/UL (ref 0–0.2)
BASOPHILS NFR BLD: 0.3 % (ref 0–1.9)
BILIRUB SERPL-MCNC: 0.2 MG/DL (ref 0.1–1)
BUN SERPL-MCNC: 9 MG/DL (ref 6–20)
CALCIUM SERPL-MCNC: 9.5 MG/DL (ref 8.7–10.5)
CHLORIDE SERPL-SCNC: 104 MMOL/L (ref 95–110)
CO2 SERPL-SCNC: 21 MMOL/L (ref 23–29)
CREAT SERPL-MCNC: 0.7 MG/DL (ref 0.5–1.4)
DIFFERENTIAL METHOD BLD: ABNORMAL
EOSINOPHIL # BLD AUTO: 0 K/UL (ref 0–0.5)
EOSINOPHIL NFR BLD: 0.1 % (ref 0–8)
ERYTHROCYTE [DISTWIDTH] IN BLOOD BY AUTOMATED COUNT: 17.6 % (ref 11.5–14.5)
EST. GFR  (NO RACE VARIABLE): >60 ML/MIN/1.73 M^2
GLUCOSE SERPL-MCNC: 207 MG/DL (ref 70–110)
HCT VFR BLD AUTO: 31.6 % (ref 37–48.5)
HGB BLD-MCNC: 9.2 G/DL (ref 12–16)
IMM GRANULOCYTES # BLD AUTO: 0.06 K/UL (ref 0–0.04)
IMM GRANULOCYTES NFR BLD AUTO: 0.9 % (ref 0–0.5)
LYMPHOCYTES # BLD AUTO: 2.5 K/UL (ref 1–4.8)
LYMPHOCYTES NFR BLD: 37 % (ref 18–48)
MCH RBC QN AUTO: 21.9 PG (ref 27–31)
MCHC RBC AUTO-ENTMCNC: 29.1 G/DL (ref 32–36)
MCV RBC AUTO: 75 FL (ref 82–98)
MONOCYTES # BLD AUTO: 0.4 K/UL (ref 0.3–1)
MONOCYTES NFR BLD: 6.4 % (ref 4–15)
NEUTROPHILS # BLD AUTO: 3.8 K/UL (ref 1.8–7.7)
NEUTROPHILS NFR BLD: 55.3 % (ref 38–73)
NRBC BLD-RTO: 0 /100 WBC
OHS QRS DURATION: 78 MS
OHS QTC CALCULATION: 408 MS
PLATELET # BLD AUTO: 324 K/UL (ref 150–450)
PMV BLD AUTO: 9 FL (ref 9.2–12.9)
POCT GLUCOSE: 193 MG/DL (ref 70–110)
POCT GLUCOSE: 263 MG/DL (ref 70–110)
POTASSIUM SERPL-SCNC: 4.8 MMOL/L (ref 3.5–5.1)
PROT SERPL-MCNC: 6.8 G/DL (ref 6–8.4)
RBC # BLD AUTO: 4.21 M/UL (ref 4–5.4)
SODIUM SERPL-SCNC: 135 MMOL/L (ref 136–145)
WBC # BLD AUTO: 6.87 K/UL (ref 3.9–12.7)

## 2025-03-02 PROCEDURE — 82962 GLUCOSE BLOOD TEST: CPT

## 2025-03-02 PROCEDURE — 85025 COMPLETE CBC W/AUTO DIFF WBC: CPT | Performed by: FAMILY MEDICINE

## 2025-03-02 PROCEDURE — 82010 KETONE BODYS QUAN: CPT | Performed by: FAMILY MEDICINE

## 2025-03-02 PROCEDURE — 93010 ELECTROCARDIOGRAM REPORT: CPT | Mod: ,,, | Performed by: INTERNAL MEDICINE

## 2025-03-02 PROCEDURE — 80053 COMPREHEN METABOLIC PANEL: CPT | Performed by: FAMILY MEDICINE

## 2025-03-02 PROCEDURE — 96360 HYDRATION IV INFUSION INIT: CPT

## 2025-03-02 PROCEDURE — 93005 ELECTROCARDIOGRAM TRACING: CPT

## 2025-03-02 PROCEDURE — 99285 EMERGENCY DEPT VISIT HI MDM: CPT | Mod: 25

## 2025-03-02 PROCEDURE — 25000003 PHARM REV CODE 250: Performed by: FAMILY MEDICINE

## 2025-03-02 RX ORDER — FLUCONAZOLE 200 MG/1
200 TABLET ORAL DAILY
Qty: 7 TABLET | Refills: 0 | Status: SHIPPED | OUTPATIENT
Start: 2025-03-02 | End: 2025-03-09

## 2025-03-02 RX ORDER — NYSTATIN 100000 [USP'U]/ML
4 SUSPENSION ORAL 4 TIMES DAILY
Qty: 160 ML | Refills: 0 | Status: SHIPPED | OUTPATIENT
Start: 2025-03-02 | End: 2025-03-12

## 2025-03-02 RX ADMIN — SODIUM CHLORIDE 1000 ML: 0.9 INJECTION, SOLUTION INTRAVENOUS at 11:03

## 2025-03-02 NOTE — TELEPHONE ENCOUNTER
Patient is calling, states her BS is 399 on Dexcom. This has gone on for a week or so. She has spoken to her provider. Unclear if she is correcting. Asked about slow speech, states she has a memory issue so has to think about her words. Denies vomiting but states nausea. States she did adjust her insulin pump.Asking about how to dose with her insulin Lantus pens. Patient seems very confused re her insulin. States she does not know how to dose her Lantus. Talking about a sliding scale. I only see mention of Novolog mention at UC visit. Stressed to patient need to be seen at ED, as she is home alone she is to call 911 now. Verb understanding.   Reason for Disposition   [1] Vomiting AND [2] signs of dehydration (e.g., very dry mouth, lightheaded, dark urine)    Additional Information   Negative: Unconscious or difficult to awaken   Negative: Acting confused (e.g., disoriented, slurred speech)   Negative: Very weak (e.g., can't stand)   Negative: Sounds like a life-threatening emergency to the triager    Protocols used: Diabetes - High Blood Sugar-A-

## 2025-03-02 NOTE — ED PROVIDER NOTES
"SCRIBE #1 NOTE: I, Bright Kay, am scribing for, and in the presence of, Roopa Dupree MD. I have scribed the entire note.       History     Chief Complaint   Patient presents with    Hyperglycemia     Pt. Reports elevated CBG for "a few weeks." Pt. States her home monitor showed > 400 this morning. Also c/o dizziness, headaches. Has insulin pump and dexcom and reports compliance with that. Denies medication changes.      Review of patient's allergies indicates:   Allergen Reactions    Codeine Itching    Hydromorphone Other (See Comments)     Can't wake up for long time if taken    Slow to wake up after surgery after receiving    Aleve [naproxen sodium]      Increases BP    Ibuprofen      Increases BP    Neuromuscular blockers, steroidal Hives     some    Pregabalin Itching    Latex, natural rubber Rash    Morphine Rash     itching    Norco [hydrocodone-acetaminophen] Itching, Rash and Hallucinations    Secobarbital sodium Rash     itching    Tylox [oxycodone-acetaminophen] Rash         History of Present Illness     HPI    3/2/2025, 10:20 AM  History obtained from the patient and medical records      History of Present Illness: Yolanda Betts is a 54 y.o. female patient with a PMHx of anemia, anxiety, Bipolar 1 disorder, GERD, asthma, DM Type 2, arthritis, HTN, schizoaffective disorder, HLD, and seizures who presents to the Emergency Department for evaluation of elevated blood sugar which began "for a few weeks." Pt states her home monitor showed > 399 this morning. Pt states she had a recent illness on Monday but tested negative for covid and flu. Symptoms are constant and moderate in severity. No mitigating or exacerbating factors reported. Associated sxs include dizziness, HA, and nausea. Patient denies any fever, dysuria, frequency, or trouble urinating. No prior Tx specified. Pt states she still has her insulin pump and reports compliance with it. Pt states she is compliant with her other " medications as well. No further complaints or concerns at this time.       Arrival mode: Personal Transportation    PCP: Rose Khan DNP        Past Medical History:  Past Medical History:   Diagnosis Date    Abnormal Pap smear of cervix     HPV genital warts    Anemia     Anxiety     Arthritis     Asthma 10/11/2016    Bipolar 1 disorder     Diabetes mellitus, type 2     Dyslipidemia associated with type 2 diabetes mellitus 05/13/2019    Dyspnea due to COVID-19 09/12/2024    General anesthetics causing adverse effect in therapeutic use     Genital warts     GERD (gastroesophageal reflux disease)     Herpes simplex virus (HSV) infection     Hyperlipidemia     Hypertension     Hypertension complicating diabetes 05/05/2019    Migraine with aura and without status migrainosus, not intractable 03/21/2016    Mild persistent asthma without complication 10/11/2016    Morbid obesity with body mass index (BMI) of 45.0 to 49.9 in adult 08/03/2017    Myoclonus     Obstructive sleep apnea     ALEN (obstructive sleep apnea)     2L per N/C q HS    Schizoaffective disorder, bipolar type 04/25/2019    Seasonal allergic rhinitis due to pollen 05/13/2019    Seizures     Type 2 diabetes mellitus with hyperglycemia, with long-term current use of insulin 12/21/2017    Type 2 diabetes mellitus with hyperglycemia, without long-term current use of insulin 12/21/2017       Past Surgical History:  Past Surgical History:   Procedure Laterality Date    abcess removed right back      ANGIOGRAM, CORONARY, WITH LEFT HEART CATHETERIZATION N/A 9/27/2024    Procedure: Angiogram, Coronary, with Left Heart Cath;  Surgeon: Jaimie Wills MD;  Location: [facility] CATH LAB;  Service: Cardiology;  Laterality: N/A;    BELT ABDOMINOPLASTY  1996    BREAST SURGERY Bilateral 1996    Reduction    CARPAL TUNNEL RELEASE Bilateral 12/18/2023    Procedure: RELEASE, CARPAL TUNNEL;  Surgeon: Neville Roth MD;  Location[facility] OR;  Service:  Orthopedics;  Laterality: Bilateral;  bilateral carpal tunnel release     SECTION      X 2    COLONOSCOPY      COLONOSCOPY N/A 2018    Procedure: colonoscopy for iron deficiency anemia;  Surgeon: Frankie Sanchez MD;  Location: [facility] ENDO;  Service: Endoscopy;  Laterality: N/A;    COLONOSCOPY N/A 2018    Procedure: COLONOSCOPY;  Surgeon: Frankie Sanchez MD;  Location: [facility] ENDO;  Service: Endoscopy;  Laterality: N/A;    COLONOSCOPY N/A 3/10/2023    Procedure: COLONOSCOPY;  Surgeon: Lalita Montes De Oca MD;  Location: [facility] ENDO;  Service: Endoscopy;  Laterality: N/A;    COLONOSCOPY N/A 2024    Procedure: COLONOSCOPY;  Surgeon: Tara Og MD;  Location: [facility] ENDO;  Service: Endoscopy;  Laterality: N/A;    EPIDURAL STEROID INJECTION INTO CERVICAL SPINE N/A 2023    Procedure: C6/7 IL ROCAEL RN IV Sedation;  Surgeon: Otto Phillips MD;  Location: [facility] PAIN MGT;  Service: Pain Management;  Laterality: N/A;    EPIDURAL STEROID INJECTION INTO CERVICAL SPINE N/A 2024    Procedure: C5/6 IL ROCAEL;  Surgeon: Otto Phillips MD;  [facility];  Service: Pain Management;  Laterality: N/A;    ESOPHAGOGASTRODUODENOSCOPY N/A 3/10/2023    Procedure: EGD (ESOPHAGOGASTRODUODENOSCOPY);  Surgeon: Lalita Montes De Oca MD;  Location: [facility];  Service: Endoscopy;  Laterality: N/A;    HYSTERECTOMY      w/ BSO; hypermenorrhea    INJECTION OF ANESTHETIC AGENT AROUND MEDIAL BRANCH NERVES INNERVATING CERVICAL FACET JOINT Bilateral 2023    Procedure: Bilateral C5-7 MBB (diagnostic);  Surgeon: Otto Phillips MD;  Location: [facility] PAIN MGT;  Service: Pain Management;  Laterality: Bilateral;    MOUTH SURGERY  1996    OOPHORECTOMY      hyst/bso, hypermenorrhea    ROBOT-ASSISTED CHOLECYSTECTOMY USING DA DARI XI N/A 1/3/2020    Procedure: XI ROBOTIC CHOLECYSTECTOMY;  Surgeon: Damir Driscoll MD;  Location: [facility] OR;  Service: General;  Laterality: N/A;    TOTAL REDUCTION MAMMOPLASTY       "TUBAL LIGATION           Family History:  Family History   Problem Relation Name Age of Onset    Diabetes Mother      Hyperlipidemia Mother      Hypertension Mother      Asthma Mother      COPD Mother      Glaucoma Mother      Thyroid disease Mother      Anesthesia problems Mother          "almost had a cardiac arrest" , blood clots    Hypertension Father      Hyperlipidemia Father      Cancer Father          Brain, lung, liver, kidney    No Known Problems Sister      Hypertension Brother      Alcohol abuse Brother      Heart disease Maternal Grandmother      Hyperlipidemia Maternal Grandmother      Hypertension Maternal Grandmother      Cataracts Maternal Grandmother      Diabetes Maternal Grandmother      Heart disease Maternal Grandfather      Hyperlipidemia Maternal Grandfather      Hypertension Maternal Grandfather      Glaucoma Maternal Grandfather      Cancer Maternal Grandfather      Cataracts Maternal Grandfather      Macular degeneration Maternal Grandfather      Diabetes Maternal Grandfather      Heart disease Paternal Grandmother      Hyperlipidemia Paternal Grandmother      Hypertension Paternal Grandmother      Cataracts Paternal Grandmother      Heart disease Paternal Grandfather      Hyperlipidemia Paternal Grandfather      Hypertension Paternal Grandfather      Cataracts Paternal Grandfather      Breast cancer Maternal Cousin      Breast cancer Maternal Cousin      Breast cancer Maternal Cousin      Breast cancer Maternal Cousin         Social History:  Social History     Tobacco Use    Smoking status: Never     Passive exposure: Never    Smokeless tobacco: Never   Substance and Sexual Activity    Alcohol use: Not Currently     Comment: socially  No alcohol 72h prior to sx    Drug use: No    Sexual activity: Yes     Partners: Male     Birth control/protection: Surgical     Comment: john        Review of Systems     Review of Systems   Constitutional:  Negative for fever.   HENT:  Negative for sore " "throat.    Respiratory:  Negative for shortness of breath.    Cardiovascular:  Negative for chest pain.   Gastrointestinal:  Positive for nausea.   Genitourinary:  Negative for difficulty urinating, dysuria and frequency.   Musculoskeletal:  Negative for back pain.   Skin:  Negative for rash.   Neurological:  Positive for dizziness and headaches. Negative for weakness.   Hematological:  Does not bruise/bleed easily.   All other systems reviewed and are negative.     Physical Exam     Initial Vitals [03/02/25 0954]   BP Pulse Resp Temp SpO2   116/75 88 18 98.2 °F (36.8 °C) 95 %      MAP       --          Physical Exam  Nursing Notes and Vital Signs Reviewed.  Constitutional: Patient is in no acute distress. Well-developed and well-nourished.  Head: Atraumatic. Normocephalic.  Eyes: PERRL. EOM intact. Conjunctivae are not pale. No scleral icterus.  ENT: Mucous membranes are moist. Oropharynx is clear and symmetric.    Neck: Supple. Full ROM. No lymphadenopathy.  Cardiovascular: Regular rate. Regular rhythm. No murmurs, rubs, or gallops. Distal pulses are 2+ and symmetric.  Pulmonary/Chest: No respiratory distress. Clear to auscultation bilaterally. No wheezing or rales.  Abdominal: Soft and non-distended.  There is no tenderness.  No rebound, guarding, or rigidity. Good bowel sounds.  Genitourinary: No CVA tenderness  Musculoskeletal: Moves all extremities. No obvious deformities. No edema. No calf tenderness.  Skin: Warm and dry.  Neurological:  Alert, awake, and appropriate.  Normal speech.  No acute focal neurological deficits are appreciated.  Psychiatric: Normal affect. Good eye contact. Appropriate in content.     ED Course   Procedures  ED Vital Signs:  Vitals:    03/02/25 0954 03/02/25 1043 03/02/25 1109 03/02/25 1145   BP: 116/75   133/71   Pulse: 88  81 82   Resp: 18   20   Temp: 98.2 °F (36.8 °C)      TempSrc: Oral      SpO2: 95%   100%   Weight:  91.4 kg (201 lb 6.4 oz)     Height: 4' 8" (1.422 m)       " 03/02/25 1300   BP: 135/80   Pulse: 84   Resp: 17   Temp: 98.2 °F (36.8 °C)   TempSrc:    SpO2: 100%   Weight:    Height:        Abnormal Lab Results:  Labs Reviewed   CBC W/ AUTO DIFFERENTIAL - Abnormal       Result Value    WBC 6.87      RBC 4.21      Hemoglobin 9.2 (*)     Hematocrit 31.6 (*)     MCV 75 (*)     MCH 21.9 (*)     MCHC 29.1 (*)     RDW 17.6 (*)     Platelets 324      MPV 9.0 (*)     Immature Granulocytes 0.9 (*)     Gran # (ANC) 3.8      Immature Grans (Abs) 0.06 (*)     Lymph # 2.5      Mono # 0.4      Eos # 0.0      Baso # 0.02      nRBC 0      Gran % 55.3      Lymph % 37.0      Mono % 6.4      Eosinophil % 0.1      Basophil % 0.3      Differential Method Automated     COMPREHENSIVE METABOLIC PANEL - Abnormal    Sodium 135 (*)     Potassium 4.8      Chloride 104      CO2 21 (*)     Glucose 207 (*)     BUN 9      Creatinine 0.7      Calcium 9.5      Total Protein 6.8      Albumin 3.4 (*)     Total Bilirubin 0.2      Alkaline Phosphatase 94      AST 22      ALT 25      eGFR >60      Anion Gap 10     POCT GLUCOSE - Abnormal    POCT Glucose 193 (*)    POCT GLUCOSE - Abnormal    POCT Glucose 263 (*)    BETA - HYDROXYBUTYRATE, SERUM    Beta-Hydroxybutyrate 0.0          All Lab Results:  Results for orders placed or performed during the hospital encounter of 03/02/25   POCT glucose    Collection Time: 03/02/25  9:59 AM   Result Value Ref Range    POCT Glucose 263 (H) 70 - 110 mg/dL   EKG 12-lead    Collection Time: 03/02/25 10:07 AM   Result Value Ref Range    QRS Duration 78 ms    [facility] QTC Calculation 408 ms   POCT glucose    Collection Time: 03/02/25 10:41 AM   Result Value Ref Range    POCT Glucose 193 (H) 70 - 110 mg/dL   CBC auto differential    Collection Time: 03/02/25 10:52 AM   Result Value Ref Range    WBC 6.87 3.90 - 12.70 K/uL    RBC 4.21 4.00 - 5.40 M/uL    Hemoglobin 9.2 (L) 12.0 - 16.0 g/dL    Hematocrit 31.6 (L) 37.0 - 48.5 %    MCV 75 (L) 82 - 98 fL    MCH 21.9 (L) 27.0 - 31.0 pg    MCHC  29.1 (L) 32.0 - 36.0 g/dL    RDW 17.6 (H) 11.5 - 14.5 %    Platelets 324 150 - 450 K/uL    MPV 9.0 (L) 9.2 - 12.9 fL    Immature Granulocytes 0.9 (H) 0.0 - 0.5 %    Gran # (ANC) 3.8 1.8 - 7.7 K/uL    Immature Grans (Abs) 0.06 (H) 0.00 - 0.04 K/uL    Lymph # 2.5 1.0 - 4.8 K/uL    Mono # 0.4 0.3 - 1.0 K/uL    Eos # 0.0 0.0 - 0.5 K/uL    Baso # 0.02 0.00 - 0.20 K/uL    nRBC 0 0 /100 WBC    Gran % 55.3 38.0 - 73.0 %    Lymph % 37.0 18.0 - 48.0 %    Mono % 6.4 4.0 - 15.0 %    Eosinophil % 0.1 0.0 - 8.0 %    Basophil % 0.3 0.0 - 1.9 %    Differential Method Automated    Comprehensive metabolic panel    Collection Time: 03/02/25 10:52 AM   Result Value Ref Range    Sodium 135 (L) 136 - 145 mmol/L    Potassium 4.8 3.5 - 5.1 mmol/L    Chloride 104 95 - 110 mmol/L    CO2 21 (L) 23 - 29 mmol/L    Glucose 207 (H) 70 - 110 mg/dL    BUN 9 6 - 20 mg/dL    Creatinine 0.7 0.5 - 1.4 mg/dL    Calcium 9.5 8.7 - 10.5 mg/dL    Total Protein 6.8 6.0 - 8.4 g/dL    Albumin 3.4 (L) 3.5 - 5.2 g/dL    Total Bilirubin 0.2 0.1 - 1.0 mg/dL    Alkaline Phosphatase 94 40 - 150 U/L    AST 22 10 - 40 U/L    ALT 25 10 - 44 U/L    eGFR >60 >60 mL/min/1.73 m^2    Anion Gap 10 8 - 16 mmol/L   Beta - Hydroxybutyrate, Serum    Collection Time: 03/02/25 10:52 AM   Result Value Ref Range    Beta-Hydroxybutyrate 0.0 0.0 - 0.5 mmol/L     *Note: Due to a large number of results and/or encounters for the requested time period, some results have not been displayed. A complete set of results can be found in Results Review.       Imaging Results:  Imaging Results              X-Ray Chest AP Portable (Final result)  Result time 03/02/25 10:40:10      Final result by Nile Bray MD (03/02/25 10:40:10)                   Impression:      No acute cardiopulmonary abnormality.    Finalized on: 3/2/2025 10:40 AM By:  Nile Bray MD  [facility]# [id number]      2025-03-02 10:42:14.168     [facility]               Narrative:    EXAM: XR CHEST AP PORTABLE    CLINICAL HISTORY:  Hyperglycemia    COMPARISON: 11/30/2024.    FINDINGS:    No focal airspace disease process. No pleural effusion or pneumothorax.  Normal size of the cardiac silhouette.  No acute osseous or soft tissue abnormality.                                         The EKG was ordered, reviewed, and independently interpreted by the ED provider.  Interpretation time: 10:07  Rate: 83 BPM  Rhythm: normal sinus rhythm  Interpretation: No acute ST changes. No STEMI.           The Emergency Provider reviewed the vital signs and test results, which are outlined above.     ED Discussion     12:26 PM: Reassessed pt at this time. Discussed with patient and/or family/caretaker all pertinent ED information and results. Discussed pt dx and plan of tx. Gave the patient all f/u and return to the ED instructions. All questions and concerns were addressed at this time. Patient and/or family/caretaker expresses understanding of information and instructions, and is comfortable with plan to discharge. Pt is stable for discharge.     I discussed with patient and/or family/caretaker that evaluation in the ED does not suggest any emergent or life threatening medical conditions requiring immediate intervention beyond what was provided in the ED, and I believe patient is safe for discharge.  Regardless, an unremarkable evaluation in the ED does not preclude the development or presence of a serious of life threatening condition. As such, I instructed that the patient is to return immediately for any worsening or change in current symptoms.           Medical Decision Making  Amount and/or Complexity of Data Reviewed  Labs: ordered. Decision-making details documented in ED Course.  Radiology: ordered and independent interpretation performed. Decision-making details documented in ED Course.  ECG/medicine tests: ordered and independent interpretation performed. Decision-making details documented in ED Course.    Risk  Prescription drug management.                 ED Medication(s):  Medications   sodium chloride 0.9% bolus 1,000 mL 1,000 mL (0 mLs Intravenous Stopped 3/2/25 1209)       Discharge Medication List as of 3/2/2025 12:23 PM           Follow-up Information       Schedule an appointment as soon as possible for a visit  with Rose Khan DNP.    Specialty: Family Medicine  Why: As needed  Contact information:  [address]  [address]  [facility] [address] [phone]                                 Scribe Attestation:   Scribe #1: I performed the above scribed service and the documentation accurately describes the services I performed. I attest to the accuracy of the note.     Attending:   Physician Attestation Statement for Scribe #1: I, Roopa Dupree MD, personally performed the services described in this documentation, as scribed by Bright Kay, in my presence, and it is both accurate and complete.           Clinical Impression       ICD-10-CM ICD-9-CM   1. Hyperglycemia  R73.9 790.29   2. Oral thrush  B37.0 112.0       Disposition:   Disposition: Discharged  Condition: Stable       Roopa Dupree MD  03/04/25 [id number]

## 2025-03-03 ENCOUNTER — TELEPHONE (OUTPATIENT)
Dept: DIABETES | Facility: CLINIC | Age: 55
End: 2025-03-03
Payer: MEDICAID

## 2025-03-03 ENCOUNTER — PATIENT MESSAGE (OUTPATIENT)
Dept: DIABETES | Facility: CLINIC | Age: 55
End: 2025-03-03
Payer: MEDICAID

## 2025-03-03 DIAGNOSIS — E11.69 TYPE 2 DIABETES MELLITUS WITH OTHER SPECIFIED COMPLICATION, UNSPECIFIED WHETHER LONG TERM INSULIN USE: Primary | ICD-10-CM

## 2025-03-03 DIAGNOSIS — E11.69 TYPE 2 DIABETES MELLITUS WITH OTHER SPECIFIED COMPLICATION, UNSPECIFIED WHETHER LONG TERM INSULIN USE: ICD-10-CM

## 2025-03-03 RX ORDER — BLOOD-GLUCOSE SENSOR
1 EACH MISCELLANEOUS
Qty: 3 EACH | Refills: 11 | Status: SHIPPED | OUTPATIENT
Start: 2025-03-03 | End: 2026-03-03

## 2025-03-03 RX ORDER — INSULIN ASPART 100 [IU]/ML
INJECTION, SOLUTION INTRAVENOUS; SUBCUTANEOUS
Qty: 30 ML | Refills: 5 | Status: SHIPPED | OUTPATIENT
Start: 2025-03-03

## 2025-03-03 NOTE — TELEPHONE ENCOUNTER
Followed up with pt since seeing that she went to the ED this weekend r/t hyperglycemia. She reports that she is very weak and asked if she should administer LANTUS is blood sugar is high. Explained that LANTUS is a long-acting insulin and should only be given as ordered which is once or twice daily. To correct a high blood sugar, she has an insulin pump with [employer] that would assist with that. She verbalized understanding. She also reports that she spoke with Elizabeth on yesterday.   Encouraged pt to call office if she has any questions or concerns, verbalized understanding.

## 2025-03-03 NOTE — TELEPHONE ENCOUNTER
I called  to see how she was currently feeling.  stated she went to the hospital and they were able to get her BS reading down,  stated her reading was 399 almost 400 when she went to the hospital. She says she feels much better now. I asked  were there any questions or concerns she needed me to message her provider about and she stated she wasn't sure how she should be taking her Lantus 50, does she have to take it once daily ?

## 2025-03-06 DIAGNOSIS — E11.69 TYPE 2 DIABETES MELLITUS WITH OTHER SPECIFIED COMPLICATION, UNSPECIFIED WHETHER LONG TERM INSULIN USE: ICD-10-CM

## 2025-03-06 RX ORDER — BLOOD-GLUCOSE TRANSMITTER
1 EACH MISCELLANEOUS
Qty: 1 EACH | Refills: 3 | Status: SHIPPED | OUTPATIENT
Start: 2025-03-06 | End: 2026-03-06

## 2025-03-09 ENCOUNTER — NURSE TRIAGE (OUTPATIENT)
Dept: ADMINISTRATIVE | Facility: CLINIC | Age: 55
End: 2025-03-09
Payer: MEDICAID

## 2025-03-09 ENCOUNTER — OCHSNER VIRTUAL EMERGENCY DEPARTMENT (OUTPATIENT)
Facility: CLINIC | Age: 55
End: 2025-03-09
Payer: MEDICAID

## 2025-03-09 ENCOUNTER — PATIENT OUTREACH (OUTPATIENT)
Facility: OTHER | Age: 55
End: 2025-03-09
Payer: MEDICAID

## 2025-03-09 NOTE — PROGRESS NOTES
"Patient spoke with OELIDIA RN on 3/9/2025 with complaint of "Patient states history of Insulin Dependent Diabetes Mellitus and c/o elevated fasting blood glucose today, 3/09/25 of 300 mm/dL and current reading of "High" and >400. Patient states manual blood glucose reading of 411 mm/dL. Patient states she has an insulin pump in place and denies malfunction. Patient denies vomiting, weakness and lightheadedness. Patient states her Dexcom is also alarming to notify of high blood glucose readings. Patient states compliance with medication regimen and use of sliding scale."    OELIDIA RN consulted with [facility] provider, Dr. Anna, and disposition recommended was emergency room.  Will follow up with patient to see if she received the care she was needing.  "

## 2025-03-09 NOTE — TELEPHONE ENCOUNTER
"Patient states history of Insulin Dependent Diabetes Mellitus and c/o elevated fasting blood glucose today, 3/09/25 of 300 mm/dL and current reading of "High" and >400. Patient states manual blood glucose reading of 411 mm/dL. Patient states she has an insulin pump in place and denies malfunction. Patient denies vomiting, weakness and lightheadedness. Patient states her Dexcom is also alarming to notify of high blood glucose readings. Patient states compliance with medication regimen and use of sliding scale.     Case Disposition advises for patient to GO TO ED/C NOW (OR PCP TRIAGE). [facility] On Call Provider, Dr. Te Anna advised for patient to Go to ED Now for re-evaluation. Patient was seen in the ED on 3/02/25 for Hyperglycemia.     Patient given care advice per Dr. Te Anna and advised to Go to ED Now for re-evaluation. Patient also advised to contact the [facility] for any worsening symptoms. Patient states hesitancy but understanding of care advice.     Reason for Disposition   Blood glucose > 500 mg/dL (27.8 mmol/L)    Additional Information   Negative: Unconscious or difficult to awaken   Negative: Acting confused (e.g., disoriented, slurred speech)   Negative: Very weak (e.g., can't stand)   Negative: Sounds like a life-threatening emergency to the triager   Negative: [1] Vomiting AND [2] signs of dehydration (e.g., very dry mouth, lightheaded, dark urine)   Negative: [1] Blood glucose > 240 mg/dL (13.3 mmol/L) AND [2] rapid breathing    Protocols used: Diabetes - High Blood Sugar-A-    "

## 2025-03-09 NOTE — PLAN OF CARE-OVED
"[facility] Plan of Care Note  Referral Source: Nurse On-Call                               Chief Complaint   Patient presents with    Blood Sugar Problem     Pump reading "high", 399, also 411 on her other measuring device.  On insulin pump.       Recommendation: Emergency Department                          She has been following updated insulin dosing provided after ED visit; still high.         "

## 2025-03-09 NOTE — PROGRESS NOTES
Subjective:       Patient ID: Yolanda Betts is a 54 y.o. female.    Chief Complaint: Follow-up (Hospital f/u visit. Was I the hospital Sunday 2/16/25 for seizures. Pt has a neurologist appt tomorrow. /C/O steady gait), Headache, and Memory Loss      History of Present Illness:   Yolanda Betts 54 y.o. female presents today with the following:   History of Present Illness    Review of Systems   Constitutional:  Negative for fever and malaise/fatigue.   Respiratory:  Negative for shortness of breath.    Gastrointestinal:  Negative for nausea and vomiting.   Skin:  Negative for itching and rash.   Neurological:  Negative for dizziness.   Psychiatric/Behavioral:  Negative for depression. The patient is not nervous/anxious.     CHIEF COMPLAINT:  Ms. Betts presents today for follow up after recent hospitalization for seizure activity    SEIZURES:  She is uncertain about the cause of her seizures and has a neurology appointment scheduled today with Dr. Steinberg to discuss the seizure activity.    LABS:  Hemoglobin was 9.4 g/dL and hematocrit was 32.8%.    GYNECOLOGIC HISTORY:  She is postmenopausal and denies any GYN concerns.      ROS:  General: -fever, -chills, -fatigue, -weight gain, -weight loss, -loss of appetite  Eyes: -vision changes, -blurry vision, -eye pain, -eye discharge  ENT: -ear pain, -hearing loss, -tinnitus, -nasal congestion, -sore throat  Cardiovascular: -chest pain, -palpitations, -lower extremity edema  Respiratory: -cough, -shortness of breath, -wheezing, -sputum production  Endocrine: -polyuria, -polydipsia, -heat intolerance, -cold intolerance  Gastrointestinal: -abdominal pain, -heartburn, -nausea, -vomiting, -diarrhea, -constipation, -blood in stool  Genitourinary: -dysuria, -urgency, -frequency, -hematuria, -nocturia, -incontinence  Heme & Lymphatic: -easy or excessive bleeding, -easy bruising, -swollen lymph nodes  Musculoskeletal: -muscle pain, -back pain, -joint pain, -joint  "swelling  Skin: -rash, -lesion, -itching, -skin texture changes, -skin color changes  Neurological: -headache, -dizziness, -numbness, -tingling, -seizure activity, -speech difficulty, -memory loss, -confusion  Psychiatric: -anxiety, -depression, -sleep difficulty        Past Medical History:   Diagnosis Date    Abnormal Pap smear of cervix     HPV genital warts    Anemia     Anxiety     Arthritis     Asthma 10/11/2016    Bipolar 1 disorder     Diabetes mellitus, type 2     Dyslipidemia associated with type 2 diabetes mellitus 05/13/2019    Dyspnea due to COVID-19 09/12/2024    General anesthetics causing adverse effect in therapeutic use     Genital warts     GERD (gastroesophageal reflux disease)     Herpes simplex virus (HSV) infection     Hyperlipidemia     Hypertension     Hypertension complicating diabetes 05/05/2019    Migraine with aura and without status migrainosus, not intractable 03/21/2016    Mild persistent asthma without complication 10/11/2016    Morbid obesity with body mass index (BMI) of 45.0 to 49.9 in adult 08/03/2017    Myoclonus     Obstructive sleep apnea     ALEN (obstructive sleep apnea)     2L per N/C q HS    Schizoaffective disorder, bipolar type 04/25/2019    Seasonal allergic rhinitis due to pollen 05/13/2019    Seizures     Type 2 diabetes mellitus with hyperglycemia, with long-term current use of insulin 12/21/2017    Type 2 diabetes mellitus with hyperglycemia, without long-term current use of insulin 12/21/2017     Family History   Problem Relation Name Age of Onset    Diabetes Mother      Hyperlipidemia Mother      Hypertension Mother      Asthma Mother      COPD Mother      Glaucoma Mother      Thyroid disease Mother      Anesthesia problems Mother          "almost had a cardiac arrest" , blood clots    Hypertension Father      Hyperlipidemia Father      Cancer Father          Brain, lung, liver, kidney    No Known Problems Sister      Hypertension Brother      Alcohol abuse Brother " "     Heart disease Maternal Grandmother      Hyperlipidemia Maternal Grandmother      Hypertension Maternal Grandmother      Cataracts Maternal Grandmother      Diabetes Maternal Grandmother      Heart disease Maternal Grandfather      Hyperlipidemia Maternal Grandfather      Hypertension Maternal Grandfather      Glaucoma Maternal Grandfather      Cancer Maternal Grandfather      Cataracts Maternal Grandfather      Macular degeneration Maternal Grandfather      Diabetes Maternal Grandfather      Heart disease Paternal Grandmother      Hyperlipidemia Paternal Grandmother      Hypertension Paternal Grandmother      Cataracts Paternal Grandmother      Heart disease Paternal Grandfather      Hyperlipidemia Paternal Grandfather      Hypertension Paternal Grandfather      Cataracts Paternal Grandfather      Breast cancer Maternal Cousin      Breast cancer Maternal Cousin      Breast cancer Maternal Cousin      Breast cancer Maternal Cousin       Social History[1]  Encounter Medications[2]        Objective:      /78 (BP Location: Left arm, Patient Position: Sitting)   Pulse 96   Temp 99 °F (37.2 °C) (Oral)   Ht 4' 8" (1.422 m)   Wt 93.9 kg (207 lb)   SpO2 96%   BMI 46.41 kg/m²   Physical Exam  Constitutional:       General: She is not in acute distress.     Appearance: Normal appearance. She is obese. She is not ill-appearing, toxic-appearing or diaphoretic.   HENT:      Head: Normocephalic.      Right Ear: Tympanic membrane and ear canal normal.      Left Ear: Tympanic membrane and ear canal normal.      Nose: Nose normal.      Mouth/Throat:      Mouth: Mucous membranes are moist.   Eyes:      General: No scleral icterus.  Cardiovascular:      Rate and Rhythm: Normal rate and regular rhythm.      Heart sounds: No murmur heard.  Pulmonary:      Effort: Pulmonary effort is normal.      Breath sounds: Normal breath sounds.   Abdominal:      General: Bowel sounds are normal. There is no distension.      " Palpations: Abdomen is soft.      Tenderness: There is no abdominal tenderness. There is no right CVA tenderness or left CVA tenderness.   Musculoskeletal:         General: No swelling or tenderness.      Cervical back: Normal range of motion and neck supple.      Right lower leg: No edema.      Left lower leg: No edema.   Skin:     General: Skin is warm and dry.   Neurological:      General: No focal deficit present.      Mental Status: She is alert and oriented to person, place, and time.      Cranial Nerves: No cranial nerve deficit.      Motor: No weakness.      Coordination: Coordination normal.      Gait: Gait normal.   Psychiatric:         Mood and Affect: Mood normal.         Behavior: Behavior normal.         Thought Content: Thought content normal.         Judgment: Judgment normal.             Results for orders placed or performed during the hospital encounter of 02/16/25   POCT glucose    Collection Time: 02/16/25  1:02 PM   Result Value Ref Range    POCT Glucose 164 (H) 70 - 110 mg/dL   CBC auto differential    Collection Time: 02/16/25  2:24 PM   Result Value Ref Range    WBC 8.59 3.90 - 12.70 K/uL    RBC 4.30 4.00 - 5.40 M/uL    Hemoglobin 9.4 (L) 12.0 - 16.0 g/dL    Hematocrit 32.8 (L) 37.0 - 48.5 %    MCV 76 (L) 82 - 98 fL    MCH 21.9 (L) 27.0 - 31.0 pg    MCHC 28.7 (L) 32.0 - 36.0 g/dL    RDW 17.2 (H) 11.5 - 14.5 %    Platelets 336 150 - 450 K/uL    MPV 9.0 (L) 9.2 - 12.9 fL    Immature Granulocytes 0.5 0.0 - 0.5 %    Gran # (ANC) 5.6 1.8 - 7.7 K/uL    Immature Grans (Abs) 0.04 0.00 - 0.04 K/uL    Lymph # 2.4 1.0 - 4.8 K/uL    Mono # 0.6 0.3 - 1.0 K/uL    Eos # 0.0 0.0 - 0.5 K/uL    Baso # 0.01 0.00 - 0.20 K/uL    nRBC 0 0 /100 WBC    Gran % 65.1 38.0 - 73.0 %    Lymph % 27.4 18.0 - 48.0 %    Mono % 6.8 4.0 - 15.0 %    Eosinophil % 0.1 0.0 - 8.0 %    Basophil % 0.1 0.0 - 1.9 %    Differential Method Automated    Comprehensive metabolic panel    Collection Time: 02/16/25  2:24 PM   Result Value Ref  Range    Sodium 137 136 - 145 mmol/L    Potassium 5.5 (H) 3.5 - 5.1 mmol/L    Chloride 103 95 - 110 mmol/L    CO2 25 23 - 29 mmol/L    Glucose 112 (H) 70 - 110 mg/dL    BUN 11 6 - 20 mg/dL    Creatinine 0.7 0.5 - 1.4 mg/dL    Calcium 9.9 8.7 - 10.5 mg/dL    Total Protein 7.0 6.0 - 8.4 g/dL    Albumin 3.5 3.5 - 5.2 g/dL    Total Bilirubin 0.2 0.1 - 1.0 mg/dL    Alkaline Phosphatase 87 40 - 150 U/L    AST 18 10 - 40 U/L    ALT 25 10 - 44 U/L    eGFR >60 >60 mL/min/1.73 m^2    Anion Gap 9 8 - 16 mmol/L   Magnesium    Collection Time: 02/16/25  2:24 PM   Result Value Ref Range    Magnesium 2.0 1.6 - 2.6 mg/dL   Potassium    Collection Time: 02/16/25  3:19 PM   Result Value Ref Range    Potassium 5.3 (H) 3.5 - 5.1 mmol/L   Drug screen panel, emergency    Collection Time: 02/16/25  3:34 PM   Result Value Ref Range    Benzodiazepines Presumptive Positive (A) Negative    Methadone metabolites Negative Negative    Cocaine (Metab.) Negative Negative    Opiate Scrn, Ur Negative Negative    Barbiturate Screen, Ur Negative Negative    Amphetamine Screen, Ur Negative Negative    THC Negative Negative    Phencyclidine Negative Negative    Creatinine, Urine 131.2 15.0 - 325.0 mg/dL    Toxicology Information SEE COMMENT    Urinalysis, Reflex to Urine Culture Urine, Clean Catch    Collection Time: 02/16/25  3:34 PM    Specimen: Urine, Clean Catch   Result Value Ref Range    Specimen UA Urine, Clean Catch     Color, UA Yellow Yellow, Straw, Milagros    Appearance, UA Clear Clear    pH, UA 6.0 5.0 - 8.0    Specific Gravity, UA 1.020 1.005 - 1.030    Protein, UA 1+ (A) Negative    Glucose, UA 2+ (A) Negative    Ketones, UA Negative Negative    Bilirubin (UA) Negative Negative    Occult Blood UA Negative Negative    Nitrite, UA Negative Negative    Urobilinogen, UA Negative <2.0 EU/dL    Leukocytes, UA Negative Negative   Urinalysis Microscopic    Collection Time: 02/16/25  3:34 PM   Result Value Ref Range    RBC, UA 0 0 - 4 /hpf    WBC, UA  0 0 - 5 /hpf    Bacteria None None-Occ /hpf    Hyaline Casts, UA 0 0-1/lpf /lpf    Microscopic Comment SEE COMMENT      *Note: Due to a large number of results and/or encounters for the requested time period, some results have not been displayed. A complete set of results can be found in Results Review.     Assessment:       No diagnosis found.Assessment & Plan    G40.909 Epilepsy, unspecified, not intractable, without status epilepticus  D64.9 Anemia, unspecified    SEIZURE DISORDER:  - Evaluated the patient during post-hospital follow-up for recent seizure activity.  - Noted that the etiology of seizures remains unclear.  - Confirmed that the patient had seizure activity this past weekend.  - Previously referred the patient to neurology; consultation with Dr. Steinberg scheduled.  - Instructed the patient to discuss seizure concerns with neurology.  - Advised the patient to follow up with Dr. Steinberg at 1:00 PM appointment to discuss seizure concerns.    ANEMIA:  - Noted anemia with recent hemoglobin and hematocrit values of 9.4 and 32.8, respectively.  - Ordered cold blood sample for further evaluation.        Plan:   There are no diagnoses linked to this encounter.  Today's encounter took a total time of 20 minutes, and that time included Preparing to see the patient (review records, tests), Obtaining and/or reviewing separately obtained historical data, Performing a medically appropriate examination and/or evaluation , Counseling & educating the patient/family/caregiver on treatment plan with chronic health conditions , ordering medications, tests, and/or procedures, Referring and communicating with other healthcare professionals , Documenting clinical information in the electronic or other health record, Independently interpreting results & communicating results to the patient/family/caregiver.           [facility]   [address]  [place] [address]  Office  [phone]  Fax [phone]   This note was generated with the assistance of ambient listening technology. Verbal consent was obtained by the patient and accompanying visitor(s) for the recording of patient appointment to facilitate this note. I attest to having reviewed and edited the generated note for accuracy, though some syntax or spelling errors may persist. Please contact the author of this note for any clarification.            [1]   Social History  Socioeconomic History    Marital status: Single   Tobacco Use    Smoking status: Never     Passive exposure: Never    Smokeless tobacco: Never   Substance and Sexual Activity    Alcohol use: Not Currently     Comment: socially  No alcohol 72h prior to sx    Drug use: No    Sexual activity: Yes     Partners: Male     Birth control/protection: Surgical     Comment: hyst   Social History Narrative    Long-term care nurse     Social Drivers of Health     Financial Resource Strain: High Risk (2/20/2025)    Overall Financial Resource Strain (CARDIA)     Difficulty of Paying Living Expenses: Very hard   Food Insecurity: Food Insecurity Present (2/20/2025)    Hunger Vital Sign     Worried About Running Out of Food in the Last Year: Often true     Ran Out of Food in the Last Year: Often true   Transportation Needs: Patient Declined (2/20/2025)    PRAPARE - Transportation     Lack of Transportation (Medical): Patient declined     Lack of Transportation (Non-Medical): Patient declined   Physical Activity: Insufficiently Active (2/20/2025)    Exercise Vital Sign     Days of Exercise per Week: 2 days     Minutes of Exercise per Session: 20 min   Stress: Stress Concern Present (2/20/2025)    [employer] - Occupational Stress Questionnaire     Feeling of Stress : Very much   Housing Stability: High Risk (2/20/2025)    Housing Stability Vital Sign     Unable to Pay for Housing in the Last Year: Yes     Number of Times Moved in the Last Year: 0      "Homeless in the Last Year: No   [2]   Outpatient Encounter Medications as of 2/18/2025   Medication Sig Dispense Refill    albuterol (PROVENTIL) 2.5 mg /3 mL (0.083 %) nebulizer solution Take 2.5 mg by nebulization every 4 (four) hours as needed. Times a day 75 mL 1    albuterol (PROVENTIL/VENTOLIN HFA) 90 mcg/actuation inhaler Inhale 2 puffs into the lungs every 6 (six) hours as needed for Wheezing. 8.5 g 1    ALPRAZolam (XANAX) 1 MG tablet Take 1 tablet (1 mg total) by mouth 2 (two) times daily as needed for Anxiety. 60 tablet 2    atorvastatin (LIPITOR) 20 MG tablet Take 1 tablet (20 mg total) by mouth every evening 90 tablet 3    BD INSULIN SYRINGE ULTRA-FINE 1/2 mL 30 gauge x 1/2" Syrg   0    cholecalciferol, vitamin D3, 1,250 mcg (50,000 unit) Tab Take 1 capsule by mouth every 7 days. (Patient not taking: Reported on 2/28/2025) 12 tablet 3    cyclobenzaprine (FLEXERIL) 10 MG tablet Take 1 tablet (10 mg total) by mouth 3 (three) times daily as needed (Pain). 90 tablet 11    DULoxetine (CYMBALTA) 60 MG capsule Take 1 capsule by mouth 2 times daily 180 capsule 3    ergocalciferol (ERGOCALCIFEROL) 50,000 unit Cap Take 1 capsule (50,000 Units total) by mouth every 7 days. 4 capsule 6    fenofibrate (TRICOR) 145 MG tablet Take 1 tablet by mouth in the morning. 30 tablet 5    fluticasone propionate (FLONASE) 50 mcg/actuation nasal spray Take 1 spray by Each Nostril route in the morning. 16 g 3    furosemide (LASIX) 20 MG tablet Take 1 tablet (20 mg total) by mouth once daily. 90 tablet 3    glucagon (BAQSIMI) 3 mg/actuation Berthold SPRAY 1 SPRAY IN NOSTRIL AS NEEDED FOR EMERGENCY. MAY REPEAT 1 TIME AFTER 15 MINUTES 2 each 1    insulin aspart U-100 (NOVOLOG FLEXPEN U-100 INSULIN) 100 unit/mL (3 mL) InPn pen Inject up to 10 units under the skin per sliding scale 3 times a day before meals 15 mL 3    insulin glargine U-100, Lantus, (LANTUS SOLOSTAR U-100 INSULIN) 100 unit/mL (3 mL) InPn pen Inject up to 50 units daily in " "event of pump failure 15 mL 3    insulin pump cart,auto,BT,G6/7 (OMNIPOD 5 G6-G7 PODS, GEN 5,) Crtg 1 each by Misc.(Non-Drug; Combo Route) route every 48 hours. 45 each 3    insulin syringe-needle U-100 0.3 mL 30 gauge x 5/16" Syrg 1 each by Misc.(Non-Drug; Combo Route) route Every 3 (three) days. 100 each 2    lamoTRIgine (LAMICTAL) 100 MG tablet Take 1 tablet (100 mg total) by mouth every morning. 30 tablet 2    levETIRAcetam (KEPPRA) 500 MG Tab Take 1.5 tablets (750 mg total) by mouth 2 (two) times daily. 90 tablet 0    levocetirizine (XYZAL) 5 MG tablet Take 1 tablet (5 mg total) by mouth every evening. 30 tablet 11    LIDOcaine (LIDODERM) 5 % Place 2 patches onto the skin every 12 (twelve) hours as needed (pain). Remove & Discard patch within 12 hours or as directed by MD 60 patch 11    lifitegrast (XIIDRA) 5 % Dpet Place 1 drop into both eyes 2 (two) times daily. 60 each 12    magnesium oxide (MAG-OX) 400 mg (241.3 mg magnesium) tablet Take 1 tablet (400 mg total) by mouth once daily. 90 tablet 3    metoprolol succinate (TOPROL-XL) 50 MG 24 hr tablet Take 1 tablet (50 mg total) by mouth every morning. 30 tablet 11    omeprazole (PRILOSEC) 40 MG capsule Take 1 capsule (40 mg total) by mouth once daily. 90 capsule 1    pen needle, diabetic (BD ULTRA-FINE MINI PEN NEEDLE) 31 gauge x 3/16" Ndle Use 1 a day in event of pump failure 100 each 11    polyethylene glycol (GLYCOLAX) 17 gram/dose powder Take 17 g by mouth 2 (two) times daily. 1020 g 11    semaglutide (OZEMPIC) 2 mg/dose (8 mg/3 mL) PnIj Inject 2 mg under the skin every 7 days. 3 mL 1    SYMBICORT 160-4.5 mcg/actuation HFAA Inhale 2 puffs into the lungs in the morning and 2 puffs before bedtime. 2 puffs every 12 hours 10.2 g 2    triamcinolone acetonide 0.1% (KENALOG) 0.1 % paste Apply coating 2 times daily (Patient not taking: Reported on 2/28/2025) 5 g 12    zolpidem (AMBIEN CR) 6.25 MG CR tablet Take 1 tablet (6.25 mg total) by mouth nightly as needed " for Insomnia. 30 tablet 2    [DISCONTINUED] amLODIPine (NORVASC) 5 MG tablet Take 1 tablet (5 mg total) by mouth once daily. 30 tablet 11    [DISCONTINUED] blood-glucose sensor (DEXCOM G6 SENSOR) Mariela change sensor every 10 days. 3 each 11    [DISCONTINUED] blood-glucose transmitter (DEXCOM G6 TRANSMITTER) Marieal 1 each by Misc.(Non-Drug; Combo Route) route every 3 (three) months. 1 each 3    [DISCONTINUED] cevimeline (EVOXAC) 30 mg capsule Take 1 capsule by mouth in the morning and 1 capsule at noon and 1 capsule before bedtime. Do all this for 30 days. 90 capsule 0    [DISCONTINUED] glucagon (BAQSIMI) 3 mg/actuation Spry 1 spray by Nasal route as needed (hypoglycemia). For emergency use. May repeat 1 time after 15 minutes 2 each 1    [DISCONTINUED] insulin aspart U-100 (NOVOLOG U-100 INSULIN ASPART) 100 unit/mL injection To use via insulin pump. Up to 200 units every 3 days 20 mL 5    [DISCONTINUED] insulin aspart U-100 (NOVOLOG) 100 unit/mL injection NovoLOG FlexPen      [DISCONTINUED] magnesium hydroxide 400 mg/5 ml (MILK OF MAGNESIA) 400 mg/5 mL Susp Take 5 mLs by mouth every 4 (four) hours as needed.      [DISCONTINUED] nebulizer and compressor (COMP-AIR NEBULIZER COMPRESSOR) Mariela use as directed 1 each 0    [DISCONTINUED] ondansetron (ZOFRAN-ODT) 4 MG TbDL Take 1 tablet (4 mg total) by mouth every 8 (eight) hours as needed. 12 tablet 0    [DISCONTINUED] OZEMPIC 2 mg/dose (8 mg/3 mL) PnIj Inject 2 mg into the skin every 7 days.      [DISCONTINUED] promethazine (PHENERGAN) 25 MG tablet Take 25 mg by mouth every 6 (six) hours as needed.      [DISCONTINUED] secukinumab (COSENTYX PEN, 2 PENS,) 150 mg/mL PnIj Inject 300 mg into the skin every 14 (fourteen) days. 4 mL 11    [DISCONTINUED] semaglutide (OZEMPIC) 2 mg/dose (8 mg/3 mL) PnIj Inject 2 mg into the skin every 7 days. 3 mL 2    [DISCONTINUED] spironolactone (ALDACTONE) 25 MG tablet Take 1 tablet by mouth in the morning. 90 tablet 1    estradioL (ESTRACE) 0.01  % (0.1 mg/gram) vaginal cream Place 1 g vaginally twice a week. 42.5 g 3    [] fluconazole (DIFLUCAN) 150 MG Tab Take 1 tablet (150 mg total) by mouth once daily. for 2 days 1 tablet 1    [DISCONTINUED] blood-glucose meter,continuous (DEXCOM G7 ) Misc 1 each by Misc.(Non-Drug; Combo Route) route once. for 1 dose 1 each 0    [DISCONTINUED] clonazePAM (KLONOPIN) 1 MG tablet Take 1 tablet by mouth daily 30 tablet 0    [DISCONTINUED] glucagon, human recombinant, (GLUCAGON EMERGENCY KIT, HUMAN,) 1 mg SolR Inject 1 mg into the muscle as needed. Emergency use 1 each 1    [DISCONTINUED] lamoTRIgine (LAMICTAL) 100 MG tablet Take 1 tablet (100 mg total) by mouth every morning. 30 tablet 2    [DISCONTINUED] levETIRAcetam (KEPPRA) 500 MG Tab Take 1 tablet (500 mg total) by mouth 2 (two) times daily. 30 tablet 0    [DISCONTINUED] levETIRAcetam (KEPPRA) 500 MG Tab Take 1 tablet (500 mg total) by mouth 2 (two) times daily. 60 tablet 11    [DISCONTINUED] LORazepam (ATIVAN) 1 MG tablet Take 0.5 tablets (0.5 mg total) by mouth every 6 (six) hours as needed for anxiety 2 tablet 0    [DISCONTINUED] valsartan (DIOVAN) 320 MG tablet Take 1 tablet (320 mg total) by mouth once daily. 90 tablet 3    [DISCONTINUED] zolpidem (AMBIEN) 5 MG Tab Take 1 tablet (5 mg total) by mouth nightly as needed (anxiety). 30 tablet 2     No facility-administered encounter medications on file as of 2025.

## 2025-03-10 ENCOUNTER — PATIENT OUTREACH (OUTPATIENT)
Facility: OTHER | Age: 55
End: 2025-03-10
Payer: MEDICAID

## 2025-03-10 ENCOUNTER — TELEPHONE (OUTPATIENT)
Dept: RHEUMATOLOGY | Facility: CLINIC | Age: 55
End: 2025-03-10
Payer: MEDICAID

## 2025-03-10 ENCOUNTER — TELEPHONE (OUTPATIENT)
Dept: DIABETES | Facility: CLINIC | Age: 55
End: 2025-03-10
Payer: MEDICAID

## 2025-03-10 DIAGNOSIS — Z79.4 TYPE 2 DIABETES MELLITUS WITH HYPERGLYCEMIA, WITH LONG-TERM CURRENT USE OF INSULIN: ICD-10-CM

## 2025-03-10 DIAGNOSIS — M45.9 ANKYLOSING SPONDYLITIS, UNSPECIFIED SITE OF SPINE: Primary | ICD-10-CM

## 2025-03-10 DIAGNOSIS — E11.65 TYPE 2 DIABETES MELLITUS WITH HYPERGLYCEMIA, WITH LONG-TERM CURRENT USE OF INSULIN: ICD-10-CM

## 2025-03-10 DIAGNOSIS — G40.409 MYOCLONIC SEIZURE: ICD-10-CM

## 2025-03-10 DIAGNOSIS — K21.9 GASTROESOPHAGEAL REFLUX DISEASE, UNSPECIFIED WHETHER ESOPHAGITIS PRESENT: ICD-10-CM

## 2025-03-10 RX ORDER — INSULIN ASPART 100 [IU]/ML
10 INJECTION, SOLUTION INTRAVENOUS; SUBCUTANEOUS
Qty: 15 ML | Refills: 3 | Status: SHIPPED | OUTPATIENT
Start: 2025-03-10

## 2025-03-10 NOTE — TELEPHONE ENCOUNTER
Pt contacted, current , she reports las bolus at 8:30 12/95 units. She ate pizza and cottage cheese. . She reports doing correction boluses. Advised to correct now- pump will admin 4.95 units. Lower correct above to 120 - instead of 130. Recommended she see endocrinology to manage DM due to complexity and other health issues. She is scheduled for March 27 and plans to attend. Stay active and increase water intake

## 2025-03-10 NOTE — TELEPHONE ENCOUNTER
----- Message from [place] sent at 3/10/2025  9:03 AM CDT -----  Contact: Yolanda  .Type:  Needs Medical AdviceWho Called:  Yolanda Would the patient rather a call back or a response via [facility]?  Call back Best Call Back Number:  [phone] (home)  Additional Information:  Pt is calling in regard to getting a call back to discuss questions and concerns pertaining to medication.  Pt states it is urgent and that she is in pain Thanks

## 2025-03-10 NOTE — TELEPHONE ENCOUNTER
Pt states she has been out of  cosentyx for about 4 weeks and is full blown pain . Pt wants to know is there something you can give her for pain . She became rude on the phone and will like for you to give her a call back .

## 2025-03-10 NOTE — PROGRESS NOTES
"3/9/25"Patient spoke with [facility] RN on 3/9/2025 with complaint of "Patient states history of Insulin Dependent Diabetes Mellitus and c/o elevated fasting blood glucose today, 3/09/25 of 300 mm/dL and current reading of "High" and >400. Patient states manual blood glucose reading of 411 mm/dL. Patient states she has an insulin pump in place and denies malfunction. Patient denies vomiting, weakness and lightheadedness. Patient states her Dexcom is also alarming to notify of high blood glucose readings. Patient states compliance with medication regimen and use of sliding scale."   [facility] RN consulted with [facility] provider, Dr. Anna, and disposition recommended was emergency room.  Will follow up with patient to see if she received the care she was needing."    3/10/25 No ED encounter noted, called patient to follow up. Patient states she has spoken to her doctor regarding her blood sugar and an appointment with Endocrine has been scheduled. Patient requests a sooner appointment with Rheumatology. No sooner appointments available. Message sent to Chico Jackson MD staff requesting a sooner appointment if available.     "

## 2025-03-10 NOTE — TELEPHONE ENCOUNTER
Spoke with pt, she states that her blood glucose is currently over 300 and she continues to have difficulty getting it to come down. Message routed to Kathy

## 2025-03-10 NOTE — TELEPHONE ENCOUNTER
----- Message from [place] sent at 3/10/2025  9:03 AM CDT -----  Contact: Yolanda  .Type:  Needs Medical AdviceWho Called:  Yolanda Would the patient rather a call back or a response via [facility]?  Call back Best Call Back Number:  [phone] (home)  Additional Information:  Pt is calling in regard to getting a call back to discuss questions and concerns pertaining to medication.  Pt states it is urgent and that she is in pain Thanks

## 2025-03-11 RX ORDER — LEVETIRACETAM 500 MG/1
750 TABLET ORAL 2 TIMES DAILY
Qty: 90 TABLET | Refills: 0 | Status: SHIPPED | OUTPATIENT
Start: 2025-03-11 | End: 2026-03-11

## 2025-03-11 RX ORDER — OMEPRAZOLE 40 MG/1
40 CAPSULE, DELAYED RELEASE ORAL DAILY
Qty: 90 CAPSULE | Refills: 1 | Status: SHIPPED | OUTPATIENT
Start: 2025-03-11

## 2025-03-12 ENCOUNTER — OFFICE VISIT (OUTPATIENT)
Dept: DIABETES | Facility: CLINIC | Age: 55
End: 2025-03-12
Payer: MEDICAID

## 2025-03-12 DIAGNOSIS — Z79.4 TYPE 2 DIABETES MELLITUS WITH HYPERGLYCEMIA, WITH LONG-TERM CURRENT USE OF INSULIN: Primary | ICD-10-CM

## 2025-03-12 DIAGNOSIS — E11.65 TYPE 2 DIABETES MELLITUS WITH HYPERGLYCEMIA, WITH LONG-TERM CURRENT USE OF INSULIN: Primary | ICD-10-CM

## 2025-03-12 PROCEDURE — 1160F RVW MEDS BY RX/DR IN RCRD: CPT | Mod: CPTII,95,, | Performed by: NURSE PRACTITIONER

## 2025-03-12 PROCEDURE — 3072F LOW RISK FOR RETINOPATHY: CPT | Mod: CPTII,95,, | Performed by: NURSE PRACTITIONER

## 2025-03-12 PROCEDURE — 1159F MED LIST DOCD IN RCRD: CPT | Mod: CPTII,95,, | Performed by: NURSE PRACTITIONER

## 2025-03-12 PROCEDURE — G2211 COMPLEX E/M VISIT ADD ON: HCPCS | Mod: 95,,, | Performed by: NURSE PRACTITIONER

## 2025-03-12 PROCEDURE — 98006 SYNCH AUDIO-VIDEO EST MOD 30: CPT | Mod: 95,,, | Performed by: NURSE PRACTITIONER

## 2025-03-12 NOTE — PROGRESS NOTES
Subjective:         Patient ID: Yolanda Betts is a 54 y.o. female.  Patient's current PCP is Rose Khan DNP.       Chief Complaint: No chief complaint on file.      HPI  Yolanda Betts is a 54 y.o. Black or  female presenting for a follow up for diabetes. Patient has been diagnosed with diabetes for > 10 years.  Currently on the omnipod 5- automated mode.  Since the last visit: she has seen the RD. Due to seizures she has memory issues therefore not calculating carbs correctly. She is now using Small meal - enter 30grams carb; Medium meal 60grams carb; Large meal 90grams carb.     Complications related to diabetes:  HTN, Hyperlipidemia, peripheral neuropathy; denies Pancreatitis; denies Gastroparesis; denies DKA; denies Hx/family Hx of MEN2/MTC; reports Frequent UTIs/yeast infections; Bariatric surgery 6/1/21. IBS- diarrhea/constipation    Past failed treatment include:  Jardiance- multiple yeast infections; Victoza - GI upset. GLP-1- not advised due to GI issues (per GI specialist and DM management), Metformin- low BGs    Blood glucose testing is performed regularly with her Dexcom. Interpretation of CGMS as follows: Average Glucose: 250 mg/dl; 47 % Very High; 32% High; 21% In Range; 0% Low;  0% Very Low.     Compliance:The patient reports medication and diet noncompliance       The patient location is: [place], [place]  The chief complaint leading to consultation is: DM follow up    Visit type: audiovisual    Face to Face time with patient: 30 minutes of total time spent on the encounter, which includes face to face time and non-face to face time preparing to see the patient (eg, review of tests), Obtaining and/or reviewing separately obtained history, Documenting clinical information in the electronic or other health record, Independently interpreting results (not separately reported) and communicating results to the patient/family/caregiver, or Care coordination (not  separately reported). Each patient to whom he or she provides medical services by telemedicine is:  (1) informed of the relationship between the physician and patient and the respective role of any other health care provider with respect to management of the patient; and (2) notified that he or she may decline to receive medical services by telemedicine and may withdraw from such care at any time.         //   , There is no height or weight on file to calculate BMI.  Her blood sugar in clinic today is:   Lab Results   Component Value Date    POCGLU 214 (A) 11/17/2024       Labs reviewed and are noted below.  Her most recent A1C is:  Lab Results   Component Value Date    HGBA1C 9.7 (H) 11/18/2024    HGBA1C 6.6 (H) 06/27/2024    HGBA1C 6.5 (H) 11/29/2023     Lab Results   Component Value Date    CPEPTIDE 4.56 02/26/2018     Lab Results   Component Value Date    GLUTAMICACID 0.00 02/26/2018     Glucose   Date Value Ref Range Status   03/02/2025 207 (H) 70 - 110 mg/dL Final     Anion Gap   Date Value Ref Range Status   03/02/2025 10 8 - 16 mmol/L Final     eGFR if    Date Value Ref Range Status   03/08/2022 >60.0 >60 mL/min/1.73 m^2 Final     eGFR if non    Date Value Ref Range Status   03/08/2022 >60.0 >60 mL/min/1.73 m^2 Final     Comment:     Calculation used to obtain the estimated glomerular filtration  rate (eGFR) is the CKD-EPI equation.          CURRENT DM MEDICATIONS:   Diabetes Medications               glucagon (BAQSIMI) 3 mg/actuation Spry 1 spray by Nasal route as needed (hypoglycemia). For emergency use. May repeat 1 time after 15 minutes    insulin aspart U-100 (NOVOLOG FLEXPEN U-100 INSULIN) 100 unit/mL (3 mL) InPn pen Inject 24 Units into the skin 3 (three) times daily before meals. Use per sliding scale for breakfast and dinner    insulin aspart U-100 (NOVOLOG U-100 INSULIN ASPART) 100 unit/mL injection Inject 100 Units into the skin Every 3 (three) days. To use via  insulin pump                   Diabetes Management Status    Statin: Taking  ACE/ARB: Taking    Screening or Prevention Patient's value Goal Complete/Controlled?   HgA1C Testing and Control   Lab Results   Component Value Date    HGBA1C 9.7 (H) 11/18/2024      Annually/Less than 8% Yes   Lipid profile : 11/30/2024 Annually Yes   LDL control Lab Results   Component Value Date    LDLCALC 91.6 11/30/2024    Annually/Less than 100 mg/dl  Yes   Nephropathy screening Lab Results   Component Value Date    LABMICR 10.0 12/17/2024     Lab Results   Component Value Date    PROTEINUA 1+ (A) 02/16/2025    Annually Yes   Blood pressure BP Readings from Last 1 Encounters:   03/02/25 135/80    Less than 140/90 Yes   Dilated retinal exam : 03/08/2024 Annually Yes   Foot exam   : 01/03/2025 Annually Yes     Review of Systems   Constitutional:  Positive for appetite change (decreased). Negative for activity change.   Gastrointestinal:  Positive for constipation and diarrhea. Negative for abdominal pain, nausea and vomiting.        Hx of IBS, GERD   Endocrine: Positive for polydipsia. Negative for polyphagia and polyuria.   Musculoskeletal:  Positive for arthralgias.   Neurological:  Negative for syncope and weakness.        Neuropathy   Psychiatric/Behavioral:  Negative for confusion.         Depression         Objective:      Physical Exam  Constitutional:       General: She is not in acute distress.     Appearance: She is not ill-appearing, toxic-appearing or diaphoretic.   Neurological:      Mental Status: She is alert and oriented to person, place, and time.   Psychiatric:         Mood and Affect: Mood normal.         Assessment:       1. Type 2 diabetes mellitus with hyperglycemia, with long-term current use of insulin                Plan:   Type 2 diabetes mellitus with hyperglycemia, with long-term current use of insulin            OP5 Novolog  Basal 0.6 Units/hr  Insulin: Carb Ratios 10 g/Unit  Sensitivity (ISF, Correction)  35 mg/dL  BG Target Range 110 mg/dL (+0/-0)  BG Correction Threshold 120 mg/dL         PLAN:   - Condition: uncontrolled    - Monitor blood glucose 4x daily. Goals reviewed  - She is working with the RD  - BP and LDL- Recommended lifestyle modifications for management. Encouraged healthy low fat, low carb diet and increase physical activity  - Pump Changes: change correct above to 110 , change SF to 32  - Continue to see the psychiatrist for depression (causing reduced appetite); discussed coping techniques  - The patient was explained the above plan and given opportunity to ask questions.  She understands, chooses and consents to this plan and accepts all the risks, which include but are not limited to the risks mentioned above.   - Labs ordered as above  - Nurse visit:  deferred    - Follow up:  She will be seeing Endocrinology   for management of care; referral placed at last ER visit        For pump failure:  Lantus 16 units once daily    Novolog:  3 units -small meal  6 units- medium meal  9 units- large meal          Correction scale (for steroid use) :  Novolog every 3 hours using correction scale outside of mealtime.  -200: 2 units  -250: 4 units  -300: 6 units  -350: 8 units  BG greater than 350: 10 units      A total of 30 minutes was spent in face to face time, of which over 50% was spent in counseling patient on disease process, complications, treatment, and side effects of medications.

## 2025-03-13 ENCOUNTER — PATIENT OUTREACH (OUTPATIENT)
Facility: OTHER | Age: 55
End: 2025-03-13
Payer: MEDICAID

## 2025-03-13 NOTE — PROGRESS NOTES
Patient scheduled for a follow up call to verify that she was able to speak to rheumatology, patient states she has spoken to them and had a medication adjustment. Patient has no additional concerns at this time.

## 2025-03-16 ENCOUNTER — HOSPITAL ENCOUNTER (EMERGENCY)
Facility: HOSPITAL | Age: 55
Discharge: HOME | End: 2025-03-16
Attending: EMERGENCY MEDICINE
Payer: MEDICAID

## 2025-03-16 VITALS
HEART RATE: 89 BPM | DIASTOLIC BLOOD PRESSURE: 58 MMHG | TEMPERATURE: 100 F | RESPIRATION RATE: 18 BRPM | HEIGHT: 56 IN | BODY MASS INDEX: 45.78 KG/M2 | WEIGHT: 203.5 LBS | SYSTOLIC BLOOD PRESSURE: 100 MMHG | OXYGEN SATURATION: 100 %

## 2025-03-16 DIAGNOSIS — R73.9 HYPERGLYCEMIA: Primary | ICD-10-CM

## 2025-03-16 DIAGNOSIS — R05.9 COUGH, UNSPECIFIED TYPE: ICD-10-CM

## 2025-03-16 LAB
ALBUMIN SERPL BCP-MCNC: 3.4 G/DL (ref 3.5–5.2)
ALP SERPL-CCNC: 116 U/L (ref 40–150)
ALT SERPL W/O P-5'-P-CCNC: 24 U/L (ref 10–44)
ANION GAP SERPL CALC-SCNC: 10 MMOL/L (ref 8–16)
AST SERPL-CCNC: 14 U/L (ref 10–40)
B-OH-BUTYR BLD STRIP-SCNC: 0.1 MMOL/L (ref 0–0.5)
BACTERIA #/AREA URNS HPF: NORMAL /HPF
BASOPHILS # BLD AUTO: 0.02 K/UL (ref 0–0.2)
BASOPHILS NFR BLD: 0.3 % (ref 0–1.9)
BILIRUB SERPL-MCNC: 0.1 MG/DL (ref 0.1–1)
BILIRUB UR QL STRIP: NEGATIVE
BUN SERPL-MCNC: 13 MG/DL (ref 6–20)
CALCIUM SERPL-MCNC: 9 MG/DL (ref 8.7–10.5)
CHLORIDE SERPL-SCNC: 101 MMOL/L (ref 95–110)
CLARITY UR: CLEAR
CO2 SERPL-SCNC: 19 MMOL/L (ref 23–29)
COLOR UR: COLORLESS
CREAT SERPL-MCNC: 0.8 MG/DL (ref 0.5–1.4)
DIFFERENTIAL METHOD BLD: ABNORMAL
EOSINOPHIL # BLD AUTO: 0 K/UL (ref 0–0.5)
EOSINOPHIL NFR BLD: 0.3 % (ref 0–8)
ERYTHROCYTE [DISTWIDTH] IN BLOOD BY AUTOMATED COUNT: 17.8 % (ref 11.5–14.5)
EST. GFR  (NO RACE VARIABLE): >60 ML/MIN/1.73 M^2
GLUCOSE SERPL-MCNC: 442 MG/DL (ref 70–110)
GLUCOSE UR QL STRIP: ABNORMAL
HCT VFR BLD AUTO: 30.8 % (ref 37–48.5)
HGB BLD-MCNC: 8.8 G/DL (ref 12–16)
HGB UR QL STRIP: NEGATIVE
IMM GRANULOCYTES # BLD AUTO: 0.1 K/UL (ref 0–0.04)
IMM GRANULOCYTES NFR BLD AUTO: 1.3 % (ref 0–0.5)
KETONES UR QL STRIP: NEGATIVE
LEUKOCYTE ESTERASE UR QL STRIP: NEGATIVE
LYMPHOCYTES # BLD AUTO: 2.9 K/UL (ref 1–4.8)
LYMPHOCYTES NFR BLD: 36.8 % (ref 18–48)
MCH RBC QN AUTO: 21.8 PG (ref 27–31)
MCHC RBC AUTO-ENTMCNC: 28.6 G/DL (ref 32–36)
MCV RBC AUTO: 76 FL (ref 82–98)
MICROSCOPIC COMMENT: NORMAL
MONOCYTES # BLD AUTO: 0.4 K/UL (ref 0.3–1)
MONOCYTES NFR BLD: 5.1 % (ref 4–15)
NEUTROPHILS # BLD AUTO: 4.5 K/UL (ref 1.8–7.7)
NEUTROPHILS NFR BLD: 56.2 % (ref 38–73)
NITRITE UR QL STRIP: NEGATIVE
NRBC BLD-RTO: 0 /100 WBC
PH UR STRIP: 6 [PH] (ref 5–8)
PLATELET # BLD AUTO: 342 K/UL (ref 150–450)
PMV BLD AUTO: 9.3 FL (ref 9.2–12.9)
POCT GLUCOSE: 250 MG/DL (ref 70–110)
POCT GLUCOSE: 386 MG/DL (ref 70–110)
POCT GLUCOSE: 464 MG/DL (ref 70–110)
POTASSIUM SERPL-SCNC: 5 MMOL/L (ref 3.5–5.1)
PROT SERPL-MCNC: 6.7 G/DL (ref 6–8.4)
PROT UR QL STRIP: NEGATIVE
RBC # BLD AUTO: 4.03 M/UL (ref 4–5.4)
SODIUM SERPL-SCNC: 130 MMOL/L (ref 136–145)
SP GR UR STRIP: 1.03 (ref 1–1.03)
URN SPEC COLLECT METH UR: ABNORMAL
UROBILINOGEN UR STRIP-ACNC: NEGATIVE EU/DL
WBC # BLD AUTO: 8 K/UL (ref 3.9–12.7)
YEAST URNS QL MICRO: NORMAL

## 2025-03-16 PROCEDURE — 81000 URINALYSIS NONAUTO W/SCOPE: CPT | Performed by: EMERGENCY MEDICINE

## 2025-03-16 PROCEDURE — 96374 THER/PROPH/DIAG INJ IV PUSH: CPT

## 2025-03-16 PROCEDURE — 25000003 PHARM REV CODE 250: Performed by: EMERGENCY MEDICINE

## 2025-03-16 PROCEDURE — 96372 THER/PROPH/DIAG INJ SC/IM: CPT | Performed by: EMERGENCY MEDICINE

## 2025-03-16 PROCEDURE — 85025 COMPLETE CBC W/AUTO DIFF WBC: CPT | Performed by: EMERGENCY MEDICINE

## 2025-03-16 PROCEDURE — 82010 KETONE BODYS QUAN: CPT | Performed by: EMERGENCY MEDICINE

## 2025-03-16 PROCEDURE — 96361 HYDRATE IV INFUSION ADD-ON: CPT

## 2025-03-16 PROCEDURE — 82962 GLUCOSE BLOOD TEST: CPT

## 2025-03-16 PROCEDURE — 80053 COMPREHEN METABOLIC PANEL: CPT | Performed by: EMERGENCY MEDICINE

## 2025-03-16 PROCEDURE — 63600175 PHARM REV CODE 636 W HCPCS: Performed by: EMERGENCY MEDICINE

## 2025-03-16 PROCEDURE — 99284 EMERGENCY DEPT VISIT MOD MDM: CPT | Mod: 25

## 2025-03-16 RX ORDER — BENZONATATE 100 MG/1
200 CAPSULE ORAL
Status: COMPLETED | OUTPATIENT
Start: 2025-03-16 | End: 2025-03-16

## 2025-03-16 RX ORDER — ACETAMINOPHEN 500 MG
1000 TABLET ORAL
Status: COMPLETED | OUTPATIENT
Start: 2025-03-16 | End: 2025-03-16

## 2025-03-16 RX ORDER — BENZONATATE 100 MG/1
200 CAPSULE ORAL 3 TIMES DAILY PRN
Qty: 20 CAPSULE | Refills: 0 | Status: SHIPPED | OUTPATIENT
Start: 2025-03-16 | End: 2025-03-26

## 2025-03-16 RX ADMIN — BENZONATATE 200 MG: 100 CAPSULE ORAL at 02:03

## 2025-03-16 RX ADMIN — SODIUM CHLORIDE 1000 ML: 9 INJECTION, SOLUTION INTRAVENOUS at 11:03

## 2025-03-16 RX ADMIN — SODIUM CHLORIDE 1000 ML: 9 INJECTION, SOLUTION INTRAVENOUS at 12:03

## 2025-03-16 RX ADMIN — ACETAMINOPHEN 1000 MG: 500 TABLET ORAL at 12:03

## 2025-03-16 RX ADMIN — HUMAN INSULIN 10 UNITS: 100 INJECTION, SOLUTION SUBCUTANEOUS at 12:03

## 2025-03-16 RX ADMIN — HUMAN INSULIN 8 UNITS: 100 INJECTION, SOLUTION SUBCUTANEOUS at 11:03

## 2025-03-16 NOTE — ED NOTES
Seizure precaution pads placed on bed rails as pt. Has hx. Of seizures and takes keppra for prevention. Denies any recent seizures.

## 2025-03-16 NOTE — ED PROVIDER NOTES
"SCRIBE #1 NOTE: I, Emmy Pritchett, am scribing for, and in the presence of, Gregory Begum Jr., MD. I have scribed the entire note.       History     Chief Complaint   Patient presents with    Hyperglycemia     Pt. Reports readings > 400 for "3-4wk." States dietician and endocrinologist are aware and have been changing insulin pump settings without success at improving glucose levels.    Shortness of Breath     Dyspnea on exertion x 2d. Pt. Very dyspneic after ambulating from  to toilet and then standing to get in stretcher from  - speaking in short bursts.      Review of patient's allergies indicates:   Allergen Reactions    Codeine Itching    Hydromorphone Other (See Comments)     Can't wake up for long time if taken    Slow to wake up after surgery after receiving    Aleve [naproxen sodium]      Increases BP    Ibuprofen      Increases BP    Neuromuscular blockers, steroidal Hives     some    Pregabalin Itching    Latex, natural rubber Rash    Morphine Rash     itching    Norco [hydrocodone-acetaminophen] Itching, Rash and Hallucinations    Secobarbital sodium Rash     itching    Tylox [oxycodone-acetaminophen] Rash         History of Present Illness     HPI    3/16/2025, 11:05 AM  History obtained from the patient and medical records      History of Present Illness: Yolanda Betts is a 54 y.o. female patient with a PMHx of anemia, anxiety, bipolar 1 disorder, GERD, asthma, DM Type II, arthritis, HTN, schizoaffective disorder, ALEN, morbid obesity, HLD, and seizures who presents to the Emergency Department for evaluation of hyperglycemia reading above 400 for the last 3-4 weeks. Pt states she has been compliant with her medications and diet. She notes her endocrinologist is aware and has been changing insulin pump settings with no improvement to blood glucose levels. Pt also notes having SOB on exertion for the last 2 days. Symptoms are constant and moderate in severity. Associated sxs include polyuria " and cough. Patient denies any rhinorrhea, sore throat, fever, chills, or dysuria. No prior Tx specified.  No further complaints or concerns at this time.       Arrival mode: Personal Transportation    PCP: Rose Khan DNP        Past Medical History:  Past Medical History:   Diagnosis Date    Abnormal Pap smear of cervix     HPV genital warts    Anemia     Anxiety     Arthritis     Asthma 10/11/2016    Bipolar 1 disorder     Diabetes mellitus, type 2     Dyslipidemia associated with type 2 diabetes mellitus 05/13/2019    Dyspnea due to COVID-19 09/12/2024    General anesthetics causing adverse effect in therapeutic use     Genital warts     GERD (gastroesophageal reflux disease)     Herpes simplex virus (HSV) infection     Hyperlipidemia     Hypertension     Hypertension complicating diabetes 05/05/2019    Migraine with aura and without status migrainosus, not intractable 03/21/2016    Mild persistent asthma without complication 10/11/2016    Morbid obesity with body mass index (BMI) of 45.0 to 49.9 in adult 08/03/2017    Myoclonus     Obstructive sleep apnea     ALEN (obstructive sleep apnea)     2L per N/C q HS    Schizoaffective disorder, bipolar type 04/25/2019    Seasonal allergic rhinitis due to pollen 05/13/2019    Seizures     Type 2 diabetes mellitus with hyperglycemia, with long-term current use of insulin 12/21/2017    Type 2 diabetes mellitus with hyperglycemia, without long-term current use of insulin 12/21/2017       Past Surgical History:  Past Surgical History:   Procedure Laterality Date    abcess removed right back      ANGIOGRAM, CORONARY, WITH LEFT HEART CATHETERIZATION N/A 9/27/2024    Procedure: Angiogram, Coronary, with Left Heart Cath;  Surgeon: Jaimie Wills MD;  Location: [facility] CATH LAB;  Service: Cardiology;  Laterality: N/A;    BELT ABDOMINOPLASTY  1996    BREAST SURGERY Bilateral 1996    Reduction    CARPAL TUNNEL RELEASE Bilateral 12/18/2023    Procedure: RELEASE,  CARPAL TUNNEL;  Surgeon: Neville Roth MD;  Location: [facility] OR;  Service: Orthopedics;  Laterality: Bilateral;  bilateral carpal tunnel release     SECTION      X 2    COLONOSCOPY      COLONOSCOPY N/A 2018    Procedure: colonoscopy for iron deficiency anemia;  Surgeon: Frankie Sanchez MD;  Location: [facility];  Service: Endoscopy;  Laterality: N/A;    COLONOSCOPY N/A 2018    Procedure: COLONOSCOPY;  Surgeon: Frankie Sanchez MD;  Location: [facility] ENDO;  Service: Endoscopy;  Laterality: N/A;    COLONOSCOPY N/A 3/10/2023    Procedure: COLONOSCOPY;  Surgeon: Lalita Montes De Oca MD;  Location: [facility];  Service: Endoscopy;  Laterality: N/A;    COLONOSCOPY N/A 2024    Procedure: COLONOSCOPY;  Surgeon: Tara Og MD;  Location: [facility];  Service: Endoscopy;  Laterality: N/A;    EPIDURAL STEROID INJECTION INTO CERVICAL SPINE N/A 2023    Procedure: C6/7 IL ROCAEL RN IV Sedation;  Surgeon: Otto Phillips MD;  Location: [facility] PAIN MGT;  Service: Pain Management;  Laterality: N/A;    EPIDURAL STEROID INJECTION INTO CERVICAL SPINE N/A 2024    Procedure: C5/6 IL ROCAEL;  Surgeon: Otto Phillips MD;  Location: [facility] PAIN MGT;  Service: Pain Management;  Laterality: N/A;    ESOPHAGOGASTRODUODENOSCOPY N/A 3/10/2023    Procedure: EGD (ESOPHAGOGASTRODUODENOSCOPY);  Surgeon: Lalita Montes De Oca MD;  Location: [facility];  Service: Endoscopy;  Laterality: N/A;    HYSTERECTOMY      w/ BSO; hypermenorrhea    INJECTION OF ANESTHETIC AGENT AROUND MEDIAL BRANCH NERVES INNERVATING CERVICAL FACET JOINT Bilateral 2023    Procedure: Bilateral C5-7 MBB (diagnostic);  Surgeon: Otto Phillips MD;  Location: [facility] PAIN MGT;  Service: Pain Management;  Laterality: Bilateral;    MOUTH SURGERY  1996    OOPHORECTOMY      hyst/bso, hypermenorrhea    ROBOT-ASSISTED CHOLECYSTECTOMY USING DA DARI XI N/A 1/3/2020    Procedure: XI ROBOTIC CHOLECYSTECTOMY;  Surgeon: Damir Driscoll MD;  Location:  "[facility] OR;  Service: General;  Laterality: N/A;    TOTAL REDUCTION MAMMOPLASTY      TUBAL LIGATION           Family History:  Family History   Problem Relation Name Age of Onset    Diabetes Mother      Hyperlipidemia Mother      Hypertension Mother      Asthma Mother      COPD Mother      Glaucoma Mother      Thyroid disease Mother      Anesthesia problems Mother          "almost had a cardiac arrest" , blood clots    Hypertension Father      Hyperlipidemia Father      Cancer Father          Brain, lung, liver, kidney    No Known Problems Sister      Hypertension Brother      Alcohol abuse Brother      Heart disease Maternal Grandmother      Hyperlipidemia Maternal Grandmother      Hypertension Maternal Grandmother      Cataracts Maternal Grandmother      Diabetes Maternal Grandmother      Heart disease Maternal Grandfather      Hyperlipidemia Maternal Grandfather      Hypertension Maternal Grandfather      Glaucoma Maternal Grandfather      Cancer Maternal Grandfather      Cataracts Maternal Grandfather      Macular degeneration Maternal Grandfather      Diabetes Maternal Grandfather      Heart disease Paternal Grandmother      Hyperlipidemia Paternal Grandmother      Hypertension Paternal Grandmother      Cataracts Paternal Grandmother      Heart disease Paternal Grandfather      Hyperlipidemia Paternal Grandfather      Hypertension Paternal Grandfather      Cataracts Paternal Grandfather      Breast cancer Maternal Cousin      Breast cancer Maternal Cousin      Breast cancer Maternal Cousin      Breast cancer Maternal Cousin         Social History:  Social History     Tobacco Use    Smoking status: Never     Passive exposure: Never    Smokeless tobacco: Never   Substance and Sexual Activity    Alcohol use: Not Currently     Comment: socially  No alcohol 72h prior to sx    Drug use: No    Sexual activity: Yes     Partners: Male     Birth control/protection: Surgical     Comment: john        Review of Systems "     Review of Systems   Constitutional:  Negative for chills and fever.   HENT:  Negative for rhinorrhea and sore throat.    Respiratory:  Positive for cough and shortness of breath.    Cardiovascular:  Negative for chest pain.   Gastrointestinal:  Negative for nausea.   Endocrine: Positive for polyuria.        (+) hyperglycemia   Genitourinary:  Negative for dysuria.   Musculoskeletal:  Negative for back pain.   Skin:  Negative for rash.   Neurological:  Negative for weakness.   Hematological:  Does not bruise/bleed easily.   All other systems reviewed and are negative.     Physical Exam     Initial Vitals   BP Pulse Resp Temp SpO2   03/16/25 1100 03/16/25 1100 03/16/25 1100 03/16/25 1125 03/16/25 1100   123/70 101 (!) 24 99.7 °F (37.6 °C) 100 %      MAP       --                 Physical Exam  Nursing Notes and Vital Signs Reviewed.  Constitutional: Patient is in no acute distress. Well-developed and well-nourished.  Head: Atraumatic. Normocephalic.  Eyes:  EOM intact.  No scleral icterus.  ENT: Mucous membranes are moist.  Nares clear   Neck:  Full ROM. No JVD.  Cardiovascular: Regular rate. Regular rhythm No murmurs, rubs, or gallops. Distal pulses are 2+ and symmetric  Pulmonary/Chest: No respiratory distress. Clear to auscultation bilaterally. No wheezing or rales.  Equal chest wall rise bilaterally  Abdominal: Soft and non-distended.  There is no tenderness.  No rebound, guarding, or rigidity. Good bowel sounds.  Genitourinary: No CVA tenderness.  No suprapubic tenderness  Musculoskeletal: Moves all extremities. No obvious deformities.  5 x 5 strength in all extremities   Skin: Warm and dry.  Neurological:  Alert, awake, and appropriate.  Normal speech.  No acute focal neurological deficits are appreciated.  Two through 12 intact bilaterally.  Psychiatric: Normal affect. Good eye contact. Appropriate in content.     ED Course   Procedures  ED Vital Signs:  Vitals:    03/16/25 1100 03/16/25 1114 03/16/25 1125  "03/16/25 1330   BP: 123/70   104/70   Pulse: 101 98  88   Resp: (!) 24   15   Temp:   99.7 °F (37.6 °C)    TempSrc:   Oral    SpO2: 100%   98%   Weight: 92.3 kg (203 lb 8 oz)      Height: 4' 8" (1.422 m)       03/16/25 1400   BP: (!) 100/58   Pulse: 89   Resp: 18   Temp:    TempSrc:    SpO2: 100%   Weight:    Height:        Abnormal Lab Results:  Labs Reviewed   CBC W/ AUTO DIFFERENTIAL - Abnormal       Result Value    WBC 8.00      RBC 4.03      Hemoglobin 8.8 (*)     Hematocrit 30.8 (*)     MCV 76 (*)     MCH 21.8 (*)     MCHC 28.6 (*)     RDW 17.8 (*)     Platelets 342      MPV 9.3      Immature Granulocytes 1.3 (*)     Gran # (ANC) 4.5      Immature Grans (Abs) 0.10 (*)     Lymph # 2.9      Mono # 0.4      Eos # 0.0      Baso # 0.02      nRBC 0      Gran % 56.2      Lymph % 36.8      Mono % 5.1      Eosinophil % 0.3      Basophil % 0.3      Differential Method Automated     COMPREHENSIVE METABOLIC PANEL - Abnormal    Sodium 130 (*)     Potassium 5.0      Chloride 101      CO2 19 (*)     Glucose 442 (*)     BUN 13      Creatinine 0.8      Calcium 9.0      Total Protein 6.7      Albumin 3.4 (*)     Total Bilirubin 0.1      Alkaline Phosphatase 116      AST 14      ALT 24      eGFR >60      Anion Gap 10     URINALYSIS, REFLEX TO URINE CULTURE - Abnormal    Specimen UA Urine, Clean Catch      Color, UA Colorless (*)     Appearance, UA Clear      pH, UA 6.0      Specific Gravity, UA 1.030      Protein, UA Negative      Glucose, UA 4+ (*)     Ketones, UA Negative      Bilirubin (UA) Negative      Occult Blood UA Negative      Nitrite, UA Negative      Urobilinogen, UA Negative      Leukocytes, UA Negative      Narrative:     Specimen Source->Urine   POCT GLUCOSE - Abnormal    POCT Glucose 464 (*)    POCT GLUCOSE - Abnormal    POCT Glucose 386 (*)    POCT GLUCOSE - Abnormal    POCT Glucose 250 (*)    BETA - HYDROXYBUTYRATE, SERUM    Beta-Hydroxybutyrate 0.1     URINALYSIS MICROSCOPIC    Bacteria None      Yeast, UA " None      Microscopic Comment SEE COMMENT      Narrative:     Specimen Source->Urine   POCT GLUCOSE MONITORING CONTINUOUS        All Lab Results:  Results for orders placed or performed during the hospital encounter of 03/16/25   POCT glucose    Collection Time: 03/16/25 11:03 AM   Result Value Ref Range    POCT Glucose 464 (HH) 70 - 110 mg/dL   Urinalysis, Reflex to Urine Culture Urine, Clean Catch    Collection Time: 03/16/25 11:13 AM    Specimen: Urine, Clean Catch   Result Value Ref Range    Specimen UA Urine, Clean Catch     Color, UA Colorless (A) Yellow, Straw, Milagros    Appearance, UA Clear Clear    pH, UA 6.0 5.0 - 8.0    Specific Gravity, UA 1.030 1.005 - 1.030    Protein, UA Negative Negative    Glucose, UA 4+ (A) Negative    Ketones, UA Negative Negative    Bilirubin (UA) Negative Negative    Occult Blood UA Negative Negative    Nitrite, UA Negative Negative    Urobilinogen, UA Negative <2.0 EU/dL    Leukocytes, UA Negative Negative   Urinalysis Microscopic    Collection Time: 03/16/25 11:13 AM   Result Value Ref Range    Bacteria None None-Occ /hpf    Yeast, UA None None    Microscopic Comment SEE COMMENT    CBC auto differential    Collection Time: 03/16/25 11:19 AM   Result Value Ref Range    WBC 8.00 3.90 - 12.70 K/uL    RBC 4.03 4.00 - 5.40 M/uL    Hemoglobin 8.8 (L) 12.0 - 16.0 g/dL    Hematocrit 30.8 (L) 37.0 - 48.5 %    MCV 76 (L) 82 - 98 fL    MCH 21.8 (L) 27.0 - 31.0 pg    MCHC 28.6 (L) 32.0 - 36.0 g/dL    RDW 17.8 (H) 11.5 - 14.5 %    Platelets 342 150 - 450 K/uL    MPV 9.3 9.2 - 12.9 fL    Immature Granulocytes 1.3 (H) 0.0 - 0.5 %    Gran # (ANC) 4.5 1.8 - 7.7 K/uL    Immature Grans (Abs) 0.10 (H) 0.00 - 0.04 K/uL    Lymph # 2.9 1.0 - 4.8 K/uL    Mono # 0.4 0.3 - 1.0 K/uL    Eos # 0.0 0.0 - 0.5 K/uL    Baso # 0.02 0.00 - 0.20 K/uL    nRBC 0 0 /100 WBC    Gran % 56.2 38.0 - 73.0 %    Lymph % 36.8 18.0 - 48.0 %    Mono % 5.1 4.0 - 15.0 %    Eosinophil % 0.3 0.0 - 8.0 %    Basophil % 0.3 0.0 -  1.9 %    Differential Method Automated    Comprehensive metabolic panel    Collection Time: 03/16/25 11:19 AM   Result Value Ref Range    Sodium 130 (L) 136 - 145 mmol/L    Potassium 5.0 3.5 - 5.1 mmol/L    Chloride 101 95 - 110 mmol/L    CO2 19 (L) 23 - 29 mmol/L    Glucose 442 (H) 70 - 110 mg/dL    BUN 13 6 - 20 mg/dL    Creatinine 0.8 0.5 - 1.4 mg/dL    Calcium 9.0 8.7 - 10.5 mg/dL    Total Protein 6.7 6.0 - 8.4 g/dL    Albumin 3.4 (L) 3.5 - 5.2 g/dL    Total Bilirubin 0.1 0.1 - 1.0 mg/dL    Alkaline Phosphatase 116 40 - 150 U/L    AST 14 10 - 40 U/L    ALT 24 10 - 44 U/L    eGFR >60 >60 mL/min/1.73 m^2    Anion Gap 10 8 - 16 mmol/L   Beta - Hydroxybutyrate, Serum    Collection Time: 03/16/25 11:19 AM   Result Value Ref Range    Beta-Hydroxybutyrate 0.1 0.0 - 0.5 mmol/L   POCT glucose    Collection Time: 03/16/25 12:04 PM   Result Value Ref Range    POCT Glucose 386 (H) 70 - 110 mg/dL   POCT glucose    Collection Time: 03/16/25  2:10 PM   Result Value Ref Range    POCT Glucose 250 (H) 70 - 110 mg/dL     *Note: Due to a large number of results and/or encounters for the requested time period, some results have not been displayed. A complete set of results can be found in Results Review.       Imaging Results:  Imaging Results              X-Ray Chest AP Portable (Preliminary result)  Result time 03/16/25 14:36:42      Wet Read by Gregory Begum Jr., MD (03/16/25 14:36:42, [facility] - Emergency Dept., Emergency Medicine)    No acute findings                                            The Emergency Provider reviewed the vital signs and test results, which are outlined above.     ED Discussion     2:18 PM: Reassessed pt at this time. Discussed with patient all pertinent ED information and results. Discussed pt dx and plan of tx. Gave the patient all f/u and return to the ED instructions. All questions and concerns were addressed at this time. Patient and/or family/caretaker expresses understanding of information and  instructions, and is comfortable with plan to discharge. Pt is stable for discharge.     I discussed with patient that evaluation in the ED does not suggest any emergent or life threatening medical conditions requiring immediate intervention beyond what was provided in the ED, and I believe patient is safe for discharge. Regardless, an unremarkable evaluation in the ED does not preclude the development or presence of a serious of life threatening condition. As such, I instructed that the patient is to return immediately for any worsening or change in current symptoms.         Medical Decision Making  Differential diagnosis, dehydration, hyperglycemia, DKA, weakness    Patient is evaluated history physical examination.  Patient with a blood sugar but no evidence of DKA.  She is treated with insulin and fluids Savannah improvement symptoms.  She would have a mild cough in his treated with Tessalon Perles.  Chest x-ray is clear.  Has a infection.  Patient is stable safe for discharge in my opinion    Amount and/or Complexity of Data Reviewed  External Data Reviewed: labs.     Details: Patient with a chronic microcytic anemia.  At baseline  Labs: ordered. Decision-making details documented in ED Course.     Details: Patient was initial blood sugar was over 400.  She has a normal anion gap however.  CO2 is 19.  Sodium was 130 and a bit low likely related to pseudo hyponatremia.  Has a hemoglobin 8.8 hematocrit 31 with a MCV of 76.  UA showed glucose but no ketones.  Beta hydroxybutyrate was undetectable  Radiology: ordered and independent interpretation performed. Decision-making details documented in ED Course.     Details: Chest x-ray is clear    Risk  OTC drugs.  Prescription drug management.  Drug therapy requiring intensive monitoring for toxicity.  Decision regarding hospitalization.                ED Medication(s):  Medications   sodium chloride 0.9% bolus 1,000 mL 1,000 mL (0 mLs Intravenous Stopped 3/16/25 5717)    insulin regular injection 8 Units 0.08 mL (8 Units Intravenous Given 3/16/25 1120)   sodium chloride 0.9% bolus 1,000 mL 1,000 mL (0 mLs Intravenous Stopped 3/16/25 1419)   insulin regular injection 10 Units 0.1 mL (10 Units Subcutaneous Given 3/16/25 1227)   acetaminophen tablet 1,000 mg (1,000 mg Oral Given 3/16/25 1238)   benzonatate capsule 200 mg (200 mg Oral Given 3/16/25 1426)       Discharge Medication List as of 3/16/2025  2:16 PM        START taking these medications    Details   benzonatate (TESSALON) 100 MG capsule Take 2 capsules (200 mg total) by mouth 3 (three) times daily as needed., Starting Sun 3/16/2025, Until Wed 3/26/2025 at 2359, Normal              Follow-up Information       Rose Khan, DNP.    Specialty: Family Medicine  Contact information:  [address]  [address]  [place] [address] [phone]                                 Scribe Attestation:   Scribe #1: I performed the above scribed service and the documentation accurately describes the services I performed. I attest to the accuracy of the note.     Attending:   Physician Attestation Statement for Scribe #1: I, Gregory Begum Jr., MD, personally performed the services described in this documentation, as scribed by Emmy Pritchett, in my presence, and it is both accurate and complete.           Clinical Impression       ICD-10-CM ICD-9-CM   1. Hyperglycemia  R73.9 790.29   2. Cough, unspecified type  R05.9 786.2       Disposition:   Disposition: Discharged  Condition: Stable         Gregory Begum Jr., MD  03/16/25 7576

## 2025-03-16 NOTE — ED NOTES
Pt. Request for tylenol for headache with photosensitivity. MD made aware and VO given - will address. Lights turned down in pt's room for comfort.

## 2025-03-17 ENCOUNTER — TELEPHONE (OUTPATIENT)
Dept: CARDIOLOGY | Facility: CLINIC | Age: 55
End: 2025-03-17
Payer: MEDICAID

## 2025-03-17 ENCOUNTER — TELEPHONE (OUTPATIENT)
Dept: DIABETES | Facility: CLINIC | Age: 55
End: 2025-03-17
Payer: MEDICAID

## 2025-03-17 ENCOUNTER — TELEPHONE (OUTPATIENT)
Dept: PULMONOLOGY | Facility: CLINIC | Age: 55
End: 2025-03-17
Payer: MEDICAID

## 2025-03-17 ENCOUNTER — NURSE TRIAGE (OUTPATIENT)
Dept: ADMINISTRATIVE | Facility: CLINIC | Age: 55
End: 2025-03-17
Payer: MEDICAID

## 2025-03-17 DIAGNOSIS — E11.65 TYPE 2 DIABETES MELLITUS WITH HYPERGLYCEMIA, WITH LONG-TERM CURRENT USE OF INSULIN: Primary | ICD-10-CM

## 2025-03-17 DIAGNOSIS — Z79.4 TYPE 2 DIABETES MELLITUS WITH HYPERGLYCEMIA, WITH LONG-TERM CURRENT USE OF INSULIN: Primary | ICD-10-CM

## 2025-03-17 RX ORDER — INSULIN GLARGINE 100 [IU]/ML
16 INJECTION, SOLUTION SUBCUTANEOUS DAILY
Qty: 15 ML | Refills: 3 | Status: SHIPPED | OUTPATIENT
Start: 2025-03-17

## 2025-03-17 RX ORDER — INSULIN ASPART 100 [IU]/ML
9 INJECTION, SOLUTION INTRAVENOUS; SUBCUTANEOUS
Qty: 15 ML | Refills: 3 | Status: SHIPPED | OUTPATIENT
Start: 2025-03-17

## 2025-03-17 NOTE — TELEPHONE ENCOUNTER
Spoke with pt, she had taken her meds and blood sugar is 238 currently. She states she feels fine, just tired. Message routed to Genevieve.

## 2025-03-17 NOTE — TELEPHONE ENCOUNTER
Pt states hospital visit yesterday due to hyperglycemia with nausea. She states OP5 bolus for meals and correction wasn't working. Pt states injecting Lantus 50units around 8:30am yesterday morning before going to hospital.     Per pop reports, noted boluses given 2:04A, 2:38A, 7:02A and -400s for several hours. Pt states not giving Novolog correction bolus via syringe or insulin pen. Noted Pod stopped around 10am, and new Pod placed ~8:30pm.     Instructed pt on hyperglycemia protocol and troubleshooting insulin pump/Pod failure: use pump backup plan (Novolog injection via syringe or insulin pen, fill & place new Pod, increase water intake, and ED visit if symptoms continue.      Reviewed pump failure plan per provider Abner last visit notes 3/12/25. Pt states using directions on her Lantus packaging and unclear on dosing.     Message sent to provider to assist clarify pump backup plan dosing. Pt verbalized understanding.   _____________________________     For pump failure:  Lantus 16 units once daily     Novolog:  3 units -small meal  6 units- medium meal  9 units- large meal     Correction scale (for steroid use) :  Novolog every 3 hours using correction scale outside of mealtime.  -200: 2 units  -250: 4 units  -300: 6 units  -350: 8 units  BG greater than 350: 10 units

## 2025-03-17 NOTE — TELEPHONE ENCOUNTER
Spoke with Pt who reports that she is having Chest pain described as dull, and pressure like that has been present for past few days. She states that she is also having SOB, and does reports having jaw pain.Pt advised to be seen in ER. Pt states that she would like to speak with provider. Did reiterate ED advisement to pt. Will send message to office.    Reason for Disposition   Pain also in shoulder(s) or arm(s) or jaw    Additional Information   Negative: SEVERE difficulty breathing (e.g., struggling for each breath, speaks in single words)   Negative: Difficult to awaken or acting confused (e.g., disoriented, slurred speech)   Negative: Shock suspected (e.g., cold/pale/clammy skin, too weak to stand, low BP, rapid pulse)   Negative: Passed out (e.g., fainted, lost consciousness, blacked out and was not responding)   Negative: Chest pain lasting longer than 5 minutes and over 44 years old   Negative: Chest pain lasting longer than 5 minutes, over 30 years old, and at least one cardiac risk factor (e.g., diabetes mellitus, high blood pressure, high cholesterol, obesity with BMI 30 or higher, smoker, or strong family history of heart disease)   Negative: Chest pain lasting longer than 5 minutes and history of heart disease (i.e., angina, heart attack, heart failure, bypass surgery, takes nitroglycerin)   Negative: Chest pain lasting longer than 5 minutes and pain is crushing, pressure-like, or heavy   Negative: Heart beating < 50 beats per minute OR > 140 beats per minute   Negative: Visible sweat on face or sweat dripping down face   Negative: Sounds like a life-threatening emergency to the triager   Negative: SEVERE chest pain    Protocols used: Chest Pain-A-[place]

## 2025-03-17 NOTE — TELEPHONE ENCOUNTER
----- Message from Halley sent at 3/17/2025  8:40 AM CDT -----  Contact: Yolanda  Type:  Patient Requesting a call back Who Called:Ade is the call back request regarding?:pt states she went to [facility] and her blood glucose level was 465Would the patient rather a call back or a response via [facility]?call Best Call Back Number:[phone] Information:Pt would like this message to go to Elizabeth Ceron & Yara Duncan call for additional information

## 2025-03-17 NOTE — PROGRESS NOTES
Subjective:       Patient ID: Yolanda Betts is a 54 y.o. female.    Chief Complaint: Shortness of Breath and Breathing Problem      52yo here for complaint of daytime fatigue  Has been going on for 6 months, getting worse  Feels foggy during the day, memory issues, concentration issues  In bed around 9pm, sometimes issues falling asleep, tired but cannot fall asleep, will watch tv is unable to sleep, wakes frequently, wakes up gasping for air, witnessed apneas, snores  concerns for narcolepsy, reports narcolepsy history, did not start medications  Complains of legs buckling with emotional changes, leg weakness, unable to stay awake sometimes, she says she has fallen off a treadmill recently due to this  Uses oxygen 2L at night  Taking cymbalta, latuda, klonapin at night for bipolar - recently decreased these medications a few weeks ago, tapering down - followed by Dr Mariam Young  Also with history of fibromyalgia  Recent weight loss, gastric sleeve 6/4/2021  Dx of circadian rhythm disorder controlled with working on sleep hygiene per last pulm note  PSG in 2020 did not reveal sleep apnea, overnight oxygen testing revealed hypoxia    Also with history of asthma  Stable on Symbicort daily and albuterol prn  No signs of infection or exacerbation    04/05/2022  Last visit was 03/14/2022  Follow up to review PSG MSLT  MSL was 6 mins  No REM naps  Mild PLM  No ALEN  Bed time 0930 pm, Wake time 5 am  Meds that may add to sleepiness  Klonopin, Flexeril, Latuda, cymbalta  Spoke about skin reactions: reymundo caballero    08/07/2024   Follow-up visit   Last visit was 04/05/2022  Move to [place] and now returns last   Labored breathing shortness of breath some wheezing   Has gained weight recently   Has been out of asthma control and rescue medications   Asthma control score is 11   Snoring at night   Has home oxygen   Chest x-ray reviewed was unremarkable   Recent emergency room visits at [facility] chest CT  scan reviewed   EKG poor R-wave progression   Refills for inhalers   Nebulizer reordered     03/17/2025   Two prior office visits reviewed   Follow-up visit   Multiple ER visits 11/17/2024, 11/29/2024, 12/18/2024, 01/28/2025, 02/16/2025, 03/02/2025  Multiple issues   Asthma score is 13   [place] score is 18   Patient states he is using CPAP and benefits   Recent weight gain from 200 passed to to 10   Has Lasix advice given to increase her dose if consistent weight gain more than 5 lb for 3 days   Sleeping poorly   Finds partially eaten food in bed, sleepwalking  Attest to having sleepwalking and sleep eating   We will add Topamax start with a low-dose  Complains of shortness a breath unlabored breathing which can last all day   Is adherent with her Symbicort   Has a nebulizer but is not currently using   Nebulizer supplies  Education on the need for rescue medication given   Chest x-ray recently done in the emergency room was reviewed   She has significant memory issues   She also complains of swelling in the right lower extremity   She has trace edema   D-dimer and echo ordered   We will see closely in about 2 weeks     Interval Hx 11/21/24:    Mrs. Betts presents today for follow up sleep apnea. She was last seen 9/2024 by Dr. Lopes for HST review and asthma. At that time she reported persistent dyspnea and fatigue.     Mask leaking, feels like it maybe loosens in the nights. Wears a nasal mask. Has chronic dry mouth. Hasn't woken with air coming out of mouth. Having trouble going back to sleep.     Pertinent Work Up:  - HST 9/2024- AHI 12, 0% study with SpO2 <90%  - 6MW 9/12/24- Resting sat 100%, range % on room air, Deysi 3-6, 312meters (68% predicted)  - ECHO 9/2024- EF 55-60%, normal diastolic function, no noted valve disease, CVP 3mmHg  - LHC 9/2024- Normal coronary arteries, elevated LV end distolic pressure, diastolic dysfunction  - FeNO 8/2024- 21ppb  - [place] 8/2024- Normal kee, no significant  BD response    Pulmonary Interventions:  - Albuterol neb  - Albuterol hfa  - Symbicort          Asthma Control Test  In the past 4  weeks, how much of the time did your asthma keep you from getting as much done at work, school or at home?: Some of the time  During the past 4 weeks, how often have you had shortness of breath?: More than once a day  During the past 4 weeks, how often did your asthma symptoms (wheezing, couging, shortness of breath, chest tightness or pain) wake you up at night or earlier than usual in the morning?: Once or twice  During the past 4 weeks, how often have you used your rescue inhaler or nebulizer medication (such as albuterol)?: 1 or 2 times per day  How would you rate your asthma control during the past 4 weeks?: Somewhat controlled  If your score is 19 or less, your asthma may not be under control: 13         3/18/2025   EPWORTH SLEEPINESS SCALE   Sitting and reading 3   Watching TV 3   Sitting, inactive in a public place (e.g. a theatre or a meeting) 3   As a passenger in a car for an hour without a break 3   Lying down to rest in the afternoon when circumstances permit 3   Sitting and talking to someone 0   Sitting quietly after a lunch without alcohol 3   In a car, while stopped for a few minutes in traffic 0   Total score 18         Immunization History   Administered Date(s) Administered    COVID-19, MRNA, LN-S, PF (MODERNA FULL 0.5 ML DOSE) 03/30/2021, 04/26/2021    Influenza 10/01/2018    Influenza - Quadrivalent - PF *Preferred* (6 months and older) 12/21/2017, 09/24/2020, 02/16/2023, 10/09/2023    Pneumococcal Conjugate - 20 Valent 01/17/2023    Pneumococcal Polysaccharide - 23 Valent 01/31/2017    Tdap 12/21/2017    Zoster Recombinant 01/17/2023, 06/27/2023      Tobacco Use: Low Risk  (3/18/2025)    Patient History     Smoking Tobacco Use: Never     Smokeless Tobacco Use: Never     Passive Exposure: Never      Past Medical History:   Diagnosis Date    Abnormal Pap smear of  cervix     HPV genital warts    Anemia     Anxiety     Arthritis     Asthma 10/11/2016    Bipolar 1 disorder     Diabetes mellitus, type 2     Dyslipidemia associated with type 2 diabetes mellitus 05/13/2019    Dyspnea due to COVID-19 09/12/2024    General anesthetics causing adverse effect in therapeutic use     Genital warts     GERD (gastroesophageal reflux disease)     Herpes simplex virus (HSV) infection     Hyperlipidemia     Hypertension     Hypertension complicating diabetes 05/05/2019    Migraine with aura and without status migrainosus, not intractable 03/21/2016    Mild persistent asthma without complication 10/11/2016    Morbid obesity with body mass index (BMI) of 45.0 to 49.9 in adult 08/03/2017    Myoclonus     Obstructive sleep apnea     ALEN (obstructive sleep apnea)     2L per N/C q HS    Schizoaffective disorder, bipolar type 04/25/2019    Seasonal allergic rhinitis due to pollen 05/13/2019    Seizures     Type 2 diabetes mellitus with hyperglycemia, with long-term current use of insulin 12/21/2017    Type 2 diabetes mellitus with hyperglycemia, without long-term current use of insulin 12/21/2017      Current Outpatient Medications on File Prior to Visit   Medication Sig Dispense Refill    acetaminophen (TYLENOL) 325 MG tablet Take 650 mg by mouth every 6 (six) hours as needed.      albuterol (PROVENTIL) 2.5 mg /3 mL (0.083 %) nebulizer solution Take 2.5 mg by nebulization every 4 (four) hours as needed. Times a day 75 mL 1    albuterol (PROVENTIL/VENTOLIN HFA) 90 mcg/actuation inhaler Inhale 2 puffs into the lungs every 6 (six) hours as needed for Wheezing. 8.5 g 1    ALPRAZolam (XANAX) 1 MG tablet Take 1 tablet (1 mg total) by mouth 2 (two) times daily as needed for Anxiety. 60 tablet 2    amLODIPine (NORVASC) 10 MG tablet Take 1 tablet (10 mg total) by mouth once daily. 30 tablet 11    atorvastatin (LIPITOR) 20 MG tablet Take 1 tablet (20 mg total) by mouth every evening 90 tablet 3    BD  "INSULIN SYRINGE ULTRA-FINE 1/2 mL 30 gauge x 1/2" Syrg   0    benzonatate (TESSALON) 100 MG capsule Take 2 capsules (200 mg total) by mouth 3 (three) times daily as needed. 20 capsule 0    blood-glucose sensor (DEXCOM G6 SENSOR) Mariela change sensor every 10 days. 3 each 11    blood-glucose transmitter (DEXCOM G6 TRANSMITTER) Mariela 1 each by Misc.(Non-Drug; Combo Route) route every 3 (three) months. 1 each 3    cholecalciferol, vitamin D3, 1,250 mcg (50,000 unit) Tab Take 1 capsule by mouth every 7 days. 12 tablet 3    cyclobenzaprine (FLEXERIL) 10 MG tablet Take 1 tablet (10 mg total) by mouth 3 (three) times daily as needed (Pain). 90 tablet 11    DULoxetine (CYMBALTA) 60 MG capsule Take 1 capsule by mouth 2 times daily 180 capsule 3    ergocalciferol (ERGOCALCIFEROL) 50,000 unit Cap Take 1 capsule (50,000 Units total) by mouth every 7 days. 4 capsule 6    fenofibrate (TRICOR) 145 MG tablet Take 1 tablet by mouth in the morning. 30 tablet 5    fluticasone propionate (FLONASE) 50 mcg/actuation nasal spray Take 1 spray by Each Nostril route in the morning. 16 g 3    furosemide (LASIX) 20 MG tablet Take 1 tablet (20 mg total) by mouth once daily. 90 tablet 3    glucagon (BAQSIMI) 3 mg/actuation [place] SPRAY 1 SPRAY IN NOSTRIL AS NEEDED FOR EMERGENCY. MAY REPEAT 1 TIME AFTER 15 MINUTES 2 each 1    insulin aspart U-100 (NOVOLOG FLEXPEN U-100 INSULIN) 100 unit/mL (3 mL) InPn pen Inject 9 Units into the skin 3 (three) times daily before meals. Use sliding scale for small medium and large meals 15 mL 3    insulin aspart U-100 (NOVOLOG U-100 INSULIN ASPART) 100 unit/mL injection To use via insulin pump. Up to 200 units every 2 days 30 mL 5    insulin glargine U-100, Lantus, (LANTUS SOLOSTAR U-100 INSULIN) 100 unit/mL (3 mL) InPn pen Inject 16 Units into the skin once daily. Use in the event of pump failure 15 mL 3    insulin pump cart,auto,BT,G6/7 (OMNIPOD 5 G6-G7 PODS, GEN 5,) Crtg 1 each by Misc.(Non-Drug; Combo Route) route " "every 48 hours. 45 each 3    insulin syringe-needle U-100 0.3 mL 30 gauge x 5/16" Syrg 1 each by Misc.(Non-Drug; Combo Route) route Every 3 (three) days. 100 each 2    lamoTRIgine (LAMICTAL) 100 MG tablet Take 1 tablet (100 mg total) by mouth every morning. 30 tablet 2    levETIRAcetam (KEPPRA) 500 MG Tab Take 1.5 tablets (750 mg total) by mouth 2 (two) times daily. 90 tablet 0    levocetirizine (XYZAL) 5 MG tablet Take 1 tablet (5 mg total) by mouth every evening. 30 tablet 11    LIDOcaine (LIDODERM) 5 % Place 2 patches onto the skin every 12 (twelve) hours as needed (pain). Remove & Discard patch within 12 hours or as directed by MD 60 patch 11    lifitegrast (XIIDRA) 5 % Dpet Place 1 drop into both eyes 2 (two) times daily. 60 each 12    magnesium oxide (MAG-OX) 400 mg (241.3 mg magnesium) tablet Take 1 tablet (400 mg total) by mouth once daily. 90 tablet 3    metoprolol succinate (TOPROL-XL) 50 MG 24 hr tablet Take 1 tablet (50 mg total) by mouth every morning. 30 tablet 11    omeprazole (PRILOSEC) 40 MG capsule Take 1 capsule (40 mg total) by mouth once daily. 90 capsule 1    ondansetron (ZOFRAN) 4 MG tablet Take 4 mg by mouth every 6 (six) hours.      pen needle, diabetic (BD ULTRA-FINE MINI PEN NEEDLE) 31 gauge x 3/16" Ndle Use 1 a day in event of pump failure 100 each 11    polyethylene glycol (GLYCOLAX) 17 gram/dose powder Take 17 g by mouth 2 (two) times daily. 1020 g 11    secukinumab (COSENTYX PEN, 2 PENS,) 150 mg/mL PnIj Inject 300 mg into the skin every 14 (fourteen) days. 4 mL 11    SYMBICORT 160-4.5 mcg/actuation HFAA Inhale 2 puffs into the lungs in the morning and 2 puffs before bedtime. 2 puffs every 12 hours 10.2 g 2    triamcinolone acetonide 0.1% (KENALOG) 0.1 % paste Apply coating 2 times daily 5 g 12    vitamin D3-vitamin K2 1,250-200 mcg Cap Take by mouth.      zolpidem (AMBIEN CR) 6.25 MG CR tablet Take 1 tablet (6.25 mg total) by mouth nightly as needed for Insomnia. 30 tablet 2    " "estradioL (ESTRACE) 0.01 % (0.1 mg/gram) vaginal cream Place 1 g vaginally twice a week. 42.5 g 3    nystatin (MYCOSTATIN) 100,000 unit/mL suspension Take 4 mLs (400,000 Units total) by mouth 4 (four) times daily. for 10 days 160 mL 0    [DISCONTINUED] clonazePAM (KLONOPIN) 1 MG tablet Take 1 tablet by mouth daily 30 tablet 0    [DISCONTINUED] glucagon, human recombinant, (GLUCAGON EMERGENCY KIT, HUMAN,) 1 mg SolR Inject 1 mg into the muscle as needed. Emergency use 1 each 1    [DISCONTINUED] valsartan (DIOVAN) 320 MG tablet Take 1 tablet (320 mg total) by mouth once daily. 90 tablet 3     No current facility-administered medications on file prior to visit.        Review of Systems   Constitutional:  Positive for fatigue. Negative for fever, weight loss, appetite change and weakness.   HENT:  Negative for postnasal drip, rhinorrhea, sinus pressure, trouble swallowing and congestion.    Respiratory:  Positive for apnea, snoring, shortness of breath and somnolence. Negative for cough, sputum production, choking, chest tightness, wheezing and dyspnea on extertion.    Cardiovascular:  Negative for chest pain and leg swelling.   Musculoskeletal:  Positive for joint swelling. Negative for arthralgias and gait problem.   Gastrointestinal:  Negative for nausea, vomiting and abdominal pain.   Neurological:  Negative for dizziness, weakness and headaches.   All other systems reviewed and are negative.      Objective:       Vitals:    03/18/25 0821   BP: 132/80   Pulse: 87   Resp: 18   SpO2: 98%   Weight: 95.2 kg (209 lb 14.1 oz)   Height: 4' 8" (1.422 m)           Physical Exam   Constitutional: She is oriented to person, place, and time. She appears well-developed and well-nourished. No distress.       HENT:   Head: Normocephalic.   Mouth/Throat: Oropharynx is clear and moist.   Cardiovascular: Normal rate and regular rhythm.   Pulmonary/Chest: Effort normal and breath sounds normal. No respiratory distress. She has no " wheezes. She has no rhonchi. She has no rales.   Musculoskeletal:         General: No edema.      Cervical back: Normal range of motion and neck supple.   Lymphadenopathy: No supraclavicular adenopathy is present.     She has no cervical adenopathy.   Neurological: She is alert and oriented to person, place, and time. Gait normal.   Skin: Skin is warm and dry.   Psychiatric:   Tearful at time of exam, received photos of her late sister at the start of her appointment   Vitals reviewed.    Personal Diagnostic Review    X-Ray Chest AP Portable  Narrative: EXAMINATION:  XR CHEST AP PORTABLE    CLINICAL HISTORY:  cough;    TECHNIQUE:  Single frontal view of the chest was performed.    COMPARISON:  Multiple    FINDINGS:  The lungs are clear, with normal appearance of pulmonary vasculature and no pleural effusion or pneumothorax.    The cardiac silhouette is normal in size. The hilar and mediastinal contours are unremarkable.    Bones are intact.  Impression: No acute abnormality.    Electronically signed by: Pelon Frye  Date:    2025  Time:    14:45    Yolanda Betts  02/15/2025 - 2025  Patient ID: [id number]  : 1970  Age: 54 years  Gender: Female  [facility]  [address], [address]  Compliance Report  Compliance  Payor Standard  Usage 02/15/2025 - 2025  Usage days 24/30 days (80%)  >= 4 hours 10 days (33%)  < 4 hours 14 days (47%)  Usage hours 99 hours 34 minutes  Average usage (total days) 3 hours 19 minutes  Average usage (days used) 4 hours 9 minutes  Median usage (days used) 3 hours 16 minutes  Total used hours (value since last reset - 2025) 811 hours  AirSense 11 AutoSet  Serial number [id number]  Mode AutoSet  Min Pressure 5 cmH2O  Max Pressure 20 cmH2O  EPR Fulltime  EPR level 2  Response Standard  Therapy  Pressure - cmH2O Median: 7.5 95th percentile: 12.4 Maximum: 14.8  Leaks - L/min Median: 5.2 95th percentile: 53.0 Maximum: 91.7  Events  per hour AI: 5.9 HI: 0.0 AHI: 5.9  Apnea Index Central: 0.0 Obstructive: 1.5 Unknown: 4.3  RERA Index 0.1  Cheyne-Grier respiration (average duration per night) 0 minutes (0%)  Usage - hours  Printed on        Assessment/Plan:       Problem List Items Addressed This Visit       Diffusion capacity of lung (dl), decreased (Chronic)    Essential hypertension (Chronic)    Dyslipidemia associated with type 2 diabetes mellitus (Chronic)    Obstructive sleep apnea    Relevant Orders    Ambulatory referral/consult to Outpatient Case Management    Moderate persistent asthma without complication - Primary    Relevant Orders    Ambulatory referral/consult to Pulmonary Disease Management w/ Respiratory Therapist    NEBULIZER KIT (SUPPLIES) FOR HOME USE    Ambulatory referral/consult to Outpatient Case Management    BMI 40.0-44.9, adult    SP Sleeve Gastrectomy      Patient would benefit from weight loss and has tried to set realistic goals to achieve success. Lifestyle changes were discussed on eating healthy, exercising at least 150 minutes weekly, and reducing sedentary behavior.   Discussed the risk factors associated with obesity: Arthritis/ALEN/Diabetes/Fatty Liver/Cardiovascular disease/GERD/HTN/HLP.           Other Visit Diagnoses         COATS (dyspnea on exertion)        Relevant Orders    X-Ray Chest PA And Lateral    Fraction of  Nitric Oxide    D-Dimer, Quantitative (Completed)      Right leg swelling        Relevant Orders    US Lower Extremity Veins Bilateral      Sleep walking and eating        Relevant Medications    melatonin (MELATIN) 3 mg tablet    topiramate (TOPAMAX) 25 MG tablet          Patient needs to improve adherence for CPAP   D-dimer was normal   We will get ultrasound of the lower extremities  Adherence with asthma and medications  Added melatonin plus Topamax for sleepwalking sleep eating parasomnia  6 ER and hospital encounter last 3 months   Patient was unable to complete fractional exhaled  nitric oxide tests     Follow up in about 2 weeks (around 4/1/2025), or labs, FeNo, CXR, Kosse, US legs, PDM, Neb supplies, increase lasix 3 days, Melatonin, Ms Libby, for download CPAP.    This note was prepared using voice recognition system and is likely to have sound alike errors that may have been overlooked even after proof reading.  Please call me with any questions    Discussed diagnosis, its evaluation, treatment and usual course. All questions answered.    Thank you for the courtesy of participating in the care of this patient    Sylvain Rainey MD       MANAGEMENT   Management of disorders of arousal from non-rapid eye movement (NREM) sleep includes behavioral and preventive strategies, recommended in all patients, and pharmacotherapy, typically reserved for more severe and injury-prone cases (table 5) [59]. There have been few prospective studies and no clinical trials in adults with disorders of arousal, and recommendations are based primarily on clinical experience, case reports, and case series.  Patients who have underlying obstructive sleep apnea (ALEN) or restless legs syndrome (RLS), which could serve as triggers for a NREM parasomnia, should receive treatment for the underlying sleep disorder.  Behavioral and preventive strategies aim to reduce or eliminate priming and precipitating factors and establish environmental safety for patients and bystanders. Reassurance and education regarding factors that increase episode frequency is critical. This includes:  ?Avoidance of sleep deprivation, alcohol, and medications associated with disorders of arousal, most notably, nonbenzodiazepine benzodiazepine receptor agonists such as zolpidem (see 'Common precipitating factors' above)  ?Maintenance of consistent and regular sleep-wake cycles (in some cases avoiding shift work)  Changes in the sleep environment should be minimized and the bedroom should be safeguarded to avoid injury. Depending upon the  "frequency and severity of the events, a variety of environmental modifications may be necessary, such as:  ?Use of padding on nearby furniture and on the floor beside the bed.  ?Lowering the mattress to the floor, or using a bedroom on the ground floor in a multilevel home.  ?Securing doors and windows with locks, a bedroom door alarm, and/or barriers at the top of the stairs.  ?Removing sharp and dangerous objects from the bedroom. If firearms are stored in the house, it is critical to store and lock them elsewhere.  ?In the case of sleep-related eating disorder (SRED), highly sought after foods should be secured outside of the kitchen. Locks may be needed on the refrigerator or kitchen cabinetry.  Parents and caregivers, bed partners, and bystanders should be educated on how to safely interact with the patient during episodes so as not exacerbate agitation, worsen confusion, or provoke violent, self-protective behavior. This generally involves allowing the patient to move freely without being physically restrained while ensuring safety. Observers should not yell or scream in an effort to arouse the patient, but rather, gently coax the patient back to bed.  Cognitive behavioral therapy for insomnia, in particular guided imagery, relaxation training, hypnosis, and mindfulness-based stress reduction (MBSR) has been effective in isolated cases and observational series [60,61]. Anticipatory awakening, whereby the patient is awoken from sleep shortly before episodes are expected to occur, has been used effectively in children for confusional arousals, sleep terrors, and sleepwalking. (See "Parasomnias of childhood, including sleepwalking", section on 'Behavioral measures'.)  Pharmacotherapy is rarely necessary [62]. Benzodiazepines, particularly clonazepam, are the best studied option in patients with frequent or injurious episodes who fail to respond to nonpharmacologic measures (table 5) [59,62-65]. In case series, " treatment with low-dose clonazepam (0.5 to 1 mg at bedtime) is effective in eliminating sleepwalking and sleep terrors in the majority of patients [61,63,64]. Melatonin may also provide improvement in patients with sleepwalking who do not initially respond to interventions focused on improving sleep hygiene [61].  Other therapies with some report of benefit include selective serotonin reuptake inhibitors for sleep-related abnormal sexual behavior [66] or SRED [24,67], and dopamine agonists for SRED related to RLS [65,68]. For patients with SRED unrelated to RLS, emerging data, including results of a small trial, suggest that topiramate reduces eating symptoms and weight [24,69-72]. Side effects (eg, cognitive dysfunction, paresthesias) are relatively common, however [72].

## 2025-03-17 NOTE — TELEPHONE ENCOUNTER
----- Message from Usha sent at 3/17/2025  9:01 AM CDT -----  Name of Caller:.Yolandamario Betts When is the first available appointment?Symptoms: shortness of breath and one leg bigger than the otherBest Call Back Number:.[phone]  Additional Information: Patient is requesting a call back regarding shortness of breath, pain in chest and one leg is bigger than the other. Transferring to triage nurse. Dorothyx. EL

## 2025-03-17 NOTE — TELEPHONE ENCOUNTER
Pt stated her chest is tight she is having sob,weakness, fatigue, and one of her legs is bigger than the other and she also has some coughing pt went to ed the other night due to blood sugar 469.     ----- Message from Usha sent at 3/17/2025  8:59 AM CDT -----  Name of Caller:JohnnieYolanda Martin Alpesh When is the first available appointment?Symptoms: shortness of breath and one leg bigger than the otherBest Call Back Number:.[phone]  Additional Information: Patient is requesting a call back regarding shortness of breath and one leg is bigger than the other. Transferring to triage nurse. Yuriy. EL

## 2025-03-18 ENCOUNTER — RESULTS FOLLOW-UP (OUTPATIENT)
Dept: SLEEP MEDICINE | Facility: CLINIC | Age: 55
End: 2025-03-18

## 2025-03-18 ENCOUNTER — LAB VISIT (OUTPATIENT)
Dept: LAB | Facility: HOSPITAL | Age: 55
End: 2025-03-18
Attending: INTERNAL MEDICINE
Payer: MEDICAID

## 2025-03-18 ENCOUNTER — OFFICE VISIT (OUTPATIENT)
Dept: PULMONOLOGY | Facility: CLINIC | Age: 55
End: 2025-03-18
Payer: MEDICAID

## 2025-03-18 ENCOUNTER — CLINICAL SUPPORT (OUTPATIENT)
Dept: PULMONOLOGY | Facility: CLINIC | Age: 55
End: 2025-03-18
Attending: INTERNAL MEDICINE
Payer: MEDICAID

## 2025-03-18 ENCOUNTER — TELEPHONE (OUTPATIENT)
Dept: NEUROLOGY | Facility: CLINIC | Age: 55
End: 2025-03-18
Payer: MEDICAID

## 2025-03-18 VITALS
RESPIRATION RATE: 18 BRPM | WEIGHT: 209.88 LBS | BODY MASS INDEX: 47.21 KG/M2 | OXYGEN SATURATION: 98 % | HEIGHT: 56 IN | HEART RATE: 87 BPM | DIASTOLIC BLOOD PRESSURE: 80 MMHG | SYSTOLIC BLOOD PRESSURE: 132 MMHG

## 2025-03-18 DIAGNOSIS — J45.40 MODERATE PERSISTENT ASTHMA WITHOUT COMPLICATION: Primary | ICD-10-CM

## 2025-03-18 DIAGNOSIS — F51.3 SLEEP WALKING AND EATING: ICD-10-CM

## 2025-03-18 DIAGNOSIS — G47.33 OBSTRUCTIVE SLEEP APNEA: ICD-10-CM

## 2025-03-18 DIAGNOSIS — R06.09 DOE (DYSPNEA ON EXERTION): ICD-10-CM

## 2025-03-18 DIAGNOSIS — E78.5 DYSLIPIDEMIA ASSOCIATED WITH TYPE 2 DIABETES MELLITUS: Chronic | ICD-10-CM

## 2025-03-18 DIAGNOSIS — I10 ESSENTIAL HYPERTENSION: Chronic | ICD-10-CM

## 2025-03-18 DIAGNOSIS — E11.69 DYSLIPIDEMIA ASSOCIATED WITH TYPE 2 DIABETES MELLITUS: Chronic | ICD-10-CM

## 2025-03-18 DIAGNOSIS — R94.2 DIFFUSION CAPACITY OF LUNG (DL), DECREASED: Chronic | ICD-10-CM

## 2025-03-18 DIAGNOSIS — M79.89 RIGHT LEG SWELLING: ICD-10-CM

## 2025-03-18 LAB — D DIMER PPP IA.FEU-MCNC: 0.47 MG/L FEU

## 2025-03-18 PROCEDURE — 99999 PR PBB SHADOW E&M-EST. PATIENT-LVL V: CPT | Mod: PBBFAC,,, | Performed by: INTERNAL MEDICINE

## 2025-03-18 PROCEDURE — 36415 COLL VENOUS BLD VENIPUNCTURE: CPT | Performed by: INTERNAL MEDICINE

## 2025-03-18 PROCEDURE — 85379 FIBRIN DEGRADATION QUANT: CPT | Performed by: INTERNAL MEDICINE

## 2025-03-18 PROCEDURE — 99215 OFFICE O/P EST HI 40 MIN: CPT | Mod: PBBFAC | Performed by: INTERNAL MEDICINE

## 2025-03-18 RX ORDER — TALC
6 POWDER (GRAM) TOPICAL NIGHTLY
Qty: 180 TABLET | Refills: 0 | Status: SHIPPED | OUTPATIENT
Start: 2025-03-18 | End: 2025-06-16

## 2025-03-18 RX ORDER — TOPIRAMATE 25 MG/1
25 TABLET ORAL NIGHTLY
Qty: 30 TABLET | Refills: 2 | Status: SHIPPED | OUTPATIENT
Start: 2025-03-18 | End: 2026-03-18

## 2025-03-18 RX ORDER — ACETAMINOPHEN 325 MG/1
650 TABLET ORAL EVERY 6 HOURS PRN
COMMUNITY
Start: 2025-02-26

## 2025-03-18 RX ORDER — ONDANSETRON 4 MG/1
4 TABLET, FILM COATED ORAL EVERY 6 HOURS
COMMUNITY
Start: 2025-02-26

## 2025-03-18 NOTE — ASSESSMENT & PLAN NOTE
SP Sleeve Gastrectomy      Patient would benefit from weight loss and has tried to set realistic goals to achieve success. Lifestyle changes were discussed on eating healthy, exercising at least 150 minutes weekly, and reducing sedentary behavior.   Discussed the risk factors associated with obesity: Arthritis/ALEN/Diabetes/Fatty Liver/Cardiovascular disease/GERD/HTN/HLP.

## 2025-03-18 NOTE — PATIENT INSTRUCTIONS
MANAGEMENT   Management of disorders of arousal from non-rapid eye movement (NREM) sleep includes behavioral and preventive strategies, recommended in all patients, and pharmacotherapy, typically reserved for more severe and injury-prone cases (table 5) [59]. There have been few prospective studies and no clinical trials in adults with disorders of arousal, and recommendations are based primarily on clinical experience, case reports, and case series.  Patients who have underlying obstructive sleep apnea (ALEN) or restless legs syndrome (RLS), which could serve as triggers for a NREM parasomnia, should receive treatment for the underlying sleep disorder.  Behavioral and preventive strategies aim to reduce or eliminate priming and precipitating factors and establish environmental safety for patients and bystanders. Reassurance and education regarding factors that increase episode frequency is critical. This includes:  ?Avoidance of sleep deprivation, alcohol, and medications associated with disorders of arousal, most notably, nonbenzodiazepine benzodiazepine receptor agonists such as zolpidem (see 'Common precipitating factors' above)  ?Maintenance of consistent and regular sleep-wake cycles (in some cases avoiding shift work)  Changes in the sleep environment should be minimized and the bedroom should be safeguarded to avoid injury. Depending upon the frequency and severity of the events, a variety of environmental modifications may be necessary, such as:  ?Use of padding on nearby furniture and on the floor beside the bed.  ?Lowering the mattress to the floor, or using a bedroom on the ground floor in a multilevel home.  ?Securing doors and windows with locks, a bedroom door alarm, and/or barriers at the top of the stairs.  ?Removing sharp and dangerous objects from the bedroom. If firearms are stored in the house, it is critical to store and lock them elsewhere.  ?In the case of sleep-related eating disorder (SRED),  "highly sought after foods should be secured outside of the kitchen. Locks may be needed on the refrigerator or kitchen cabinetry.  Parents and caregivers, bed partners, and bystanders should be educated on how to safely interact with the patient during episodes so as not exacerbate agitation, worsen confusion, or provoke violent, self-protective behavior. This generally involves allowing the patient to move freely without being physically restrained while ensuring safety. Observers should not yell or scream in an effort to arouse the patient, but rather, gently coax the patient back to bed.  Cognitive behavioral therapy for insomnia, in particular guided imagery, relaxation training, hypnosis, and mindfulness-based stress reduction (MBSR) has been effective in isolated cases and observational series [60,61]. Anticipatory awakening, whereby the patient is awoken from sleep shortly before episodes are expected to occur, has been used effectively in children for confusional arousals, sleep terrors, and sleepwalking. (See "Parasomnias of childhood, including sleepwalking", section on 'Behavioral measures'.)  Pharmacotherapy is rarely necessary [62]. Benzodiazepines, particularly clonazepam, are the best studied option in patients with frequent or injurious episodes who fail to respond to nonpharmacologic measures (table 5) [59,62-65]. In case series, treatment with low-dose clonazepam (0.5 to 1 mg at bedtime) is effective in eliminating sleepwalking and sleep terrors in the majority of patients [61,63,64]. Melatonin may also provide improvement in patients with sleepwalking who do not initially respond to interventions focused on improving sleep hygiene [61].  Other therapies with some report of benefit include selective serotonin reuptake inhibitors for sleep-related abnormal sexual behavior [66] or SRED [24,67], and dopamine agonists for SRED related to RLS [65,68]. For patients with SRED unrelated to RLS, emerging " data, including results of a small trial, suggest that topiramate reduces eating symptoms and weight [24,69-72]. Side effects (eg, cognitive dysfunction, paresthesias) are relatively common, however [72].

## 2025-03-19 ENCOUNTER — PATIENT OUTREACH (OUTPATIENT)
Dept: ADMINISTRATIVE | Facility: OTHER | Age: 55
End: 2025-03-19
Payer: MEDICAID

## 2025-03-19 ENCOUNTER — RESULTS FOLLOW-UP (OUTPATIENT)
Dept: DIABETES | Facility: CLINIC | Age: 55
End: 2025-03-19

## 2025-03-19 NOTE — PROGRESS NOTES
CHW - Initial Contact    This Community Health Worker completed OR updated the Social Determinant of Health questionnaire with patient via telephone today.    Pt identified barriers of most importance are: Gave her the number to a food bank and I will mail her housing info. Follow up in a few weeks.    Referrals were put through [facility] - no: none  Support and Services:   Other information discussed the patient needs / wants help with: Gave her the number to a food bank and I will mail her housing info. Follow up in a few weeks.    Follow up required:   Initial Outreach - Due: 4/2/2025

## 2025-03-20 ENCOUNTER — HOSPITAL ENCOUNTER (OUTPATIENT)
Dept: RADIOLOGY | Facility: HOSPITAL | Age: 55
Discharge: HOME | End: 2025-03-20
Attending: INTERNAL MEDICINE
Payer: MEDICAID

## 2025-03-20 DIAGNOSIS — M79.89 RIGHT LEG SWELLING: ICD-10-CM

## 2025-03-20 PROCEDURE — 93970 EXTREMITY STUDY: CPT | Mod: 26,,, | Performed by: RADIOLOGY

## 2025-03-20 PROCEDURE — 93970 EXTREMITY STUDY: CPT | Mod: TC

## 2025-03-26 ENCOUNTER — CLINICAL SUPPORT (OUTPATIENT)
Dept: DIABETES | Facility: CLINIC | Age: 55
End: 2025-03-26
Payer: MEDICAID

## 2025-03-26 ENCOUNTER — TELEPHONE (OUTPATIENT)
Dept: DIABETES | Facility: CLINIC | Age: 55
End: 2025-03-26

## 2025-03-26 DIAGNOSIS — Z79.4 TYPE 2 DIABETES MELLITUS WITH HYPERGLYCEMIA, WITH LONG-TERM CURRENT USE OF INSULIN: ICD-10-CM

## 2025-03-26 DIAGNOSIS — E11.65 TYPE 2 DIABETES MELLITUS WITH HYPERGLYCEMIA, WITH LONG-TERM CURRENT USE OF INSULIN: ICD-10-CM

## 2025-03-26 NOTE — Clinical Note
Hi good afternoon,  I met with Ms Betts today for diabetes education. Currently on OP5 but not using Pods consistently. Pt's main concern is having to change out Pods every 1.5 days.   Discussed Medtronic 780G +Guardian 4 system since reservoir holds 300units insulin and features meal forgiveness algorithm (pt isn't counting or entering carbs consistently). Pt was able to see clinic demo; however, she's hesitant to switch to a traditional tube pump.   Dr. Hidalgo, Pt is scheduled to see you tomorrow, referred by our NP Elizabeth Ceron. Please let me know if there's any new device training or education needs. Thank you, Yara

## 2025-03-26 NOTE — PROGRESS NOTES
Diabetes Care Specialist Virtual Visit Note     The patient location is: home in [place]  The chief complaint leading to consultation is: Diabetes  Visit type: audiovisual  Total time spent with patient: 60 min   Each patient to whom he or she provides medical services by telemedicine is:  (1) informed of the relationship between the physician and patient and the respective role of any other health care provider with respect to management of the patient; and (2) notified that he or she may decline to receive medical services by telemedicine and may withdraw from such care at any time.    Diabetes Care Specialist Progress Note  Author: Yaar Sanchez RD, [facility][note 3]ES  Date: 3/26/2025    Intake    Program Intake  Reason for Diabetes Program Visit:: Initial Diabetes Assessment (DM referral 1/27/25 Elizabeth Ceron, IRASEMA)  Type of Intervention:: Individual  Education: Self-Management Skill Review  Current diabetes risk level:: moderate  In the last month, have you used the ER or been admitted to the hospital: Yes  Was the ER or hospital admission related to diabetes?: Yes (3/16 hyperglycemia)    Current Diabetes Treatment:Novolog via OP5 and Dex G6 (training 2/26/24). For pump failure: Lantus 16 units once daily. Novolog: 3 units -small meal, 6 units- medium meal, 9 units- large meal + s/s correction.    Continuous Glucose Monitoring  Patient has CGM: Yes  Personal CGM type:: Dexcom G6 - Pt denies device or site placement issues.  GMI Date: 03/26/25  GMI Value: 9.4 %    Lab Results   Component Value Date    HGBA1C 9.0 (H) 03/18/2025     Lifestyle Coping Support & Clinical    Problem Review  Active Comorbidities:  (HTN, HLD, Neuropathy, VitD defn, Bariatric surgery 6/1/21, IBS- diarrhea/constipation, GERD, DAVIS, Iron defn anemia, Asthma, Mental health ds, BMI 47.)    Diabetes Self-Management Skills Assessment    Medication Skills Assessment  Patient is able to identify current diabetes medications, dosages, and appropriate timing of  medications.: no  Patient reports problems or concerns with current medication regimen.: yes  Medication regimen problems/concerns:: unsure of doses, does not feel like regimen is working  Patient is  aware that some diabetes medications can cause low blood sugar?: Yes  Medication Skills Assessment Completed:: Yes  Assessment indicates:: Instruction Needed  Area of need?: Yes    Nutrition/Healthy Eating  Meal Plan 24 Hour Recall - Breakfast: cereal (raisin bran ~1.5cup) w/ whole milk, apple  Meal Plan 24 Hour Recall - Lunch: mid am snack - croissant w/ butter  Meal Plan 24 Hour Recall - Dinner: salad, posada w/ grilled chix, avocado ranch dressing (parital) - water  Meal Plan 24 Hour Recall - Snack: pastrami on croissant, omega-3 delux mix (nuts, craisins) - water  Patient can identify foods that impact blood sugar.: yes (Pt requesting meal plan refresher training.)  Challenges to healthy eating::  (Refined starches and higher fat meats.)  Nutrition/Healthy Eating Skills Assessment Completed:: Yes  Assessment indicates:: Instruction Needed  Area of need?: Yes    Physical Activity/Exercise  Physical Activity/Exercise Skills Assessment Completed: : No  Deffered due to:: Time  Area of need?: Deferred    Home Blood Glucose Monitoring  Patient states that blood sugar is checked at home daily.: yes  Monitoring Method:: personal continuous glucose monitor  Personal CGM type:: Dexcom G6 - Pt denies device or site placement issues.  Home Blood Glucose Monitoring Skills Assessment Completed: : Yes  Assessment indicates:: Adequate understanding  Area of need?: No    Acute Complications  Have you ever had hypoglycemia (low BG 70 or less)?:  (Pt denies recent symptoms.)  Have you ever had hyperglycemia (high  or more)?:  (Pt denies recent symptoms.)  Acute Complications Skills Assessment Completed: : Yes  Assessment indicates:: Instruction Needed  Area of need?: Yes    Assessment Summary and Plan  Based on today's diabetes  care assessment, the following areas of need were identified:      Identified Areas of Need      Medication/Current Diabetes Treatment: Yes -see care plan   Nutrition/Healthy Eating: Yes -see care plan   Physical Activity/Exercise: Deferred    Home Blood Glucose Monitoring: No    Acute Complications: Yes   Discussed prevention, identification signs/symptoms and treatment of hyperglycemia and hypoglycemia and when to contact clinic.      Today's interventions were provided through individual discussion, instruction, and written materials were provided.    Patient verbalized understanding of instruction and written materials.  Pt was able to return back demonstration of instructions today. Patient understood key points, needs reinforcement and further instruction.     Diabetes Self-Management Care Plan:  Today's Diabetes Self-Management Care Plan was developed with Yolanda's input. Yolanda has agreed to work toward the following goal(s) to improve his/her overall diabetes control.      Care Plan: Diabetes Management   Updates made since 3/26/2024 12:00 AM        Problem: Medications         Goal: Patient Agrees to take Diabetes Medication(s) as prescribed.    Start Date: 3/26/2025   Expected End Date: 1/27/2026   Priority: High   Barriers: Other (comments)   Note:    Encouraged consistency of OP5 Pods and using pump backup plan as directed. Pt's main concern is having to change out Pods every 1.5 days.     Discussed Medtronic 780G +Guardian 4 system since reservoir holds 300units insulin and features meal forgiveness algorithm. Noted pt in seeing endo tomorrow. Message to new provider and Abner. Pt open to considering new device but dislikes idea of having tubing and traditional type pump.        Task: Reviewed with patient all current diabetes medications and provided basic review of the purpose, dosage, frequency, side effects, and storage of both oral and injectable diabetes medications. Completed 3/26/2025         Task: Discussed guidelines for preventing, detecting and treating hypoglycemia and hyperglycemia and reviewed the importance of meal and medication timing with diabetes mediations for prevention of hypoglycemia and maximum drug benefit. Completed 3/26/2025        Problem: Healthy Eating         Goal: Count carbohydrates accurately and enter into pump bolus calculator.    Start Date: 3/26/2025   Expected End Date: 1/27/2026   Priority: Medium   Barriers: Cognitive Deficits   Note:    Instructed on role of fiber on BG stability and high fat/carb on excursions. Pt agrees to switch to more whole grain bread options and decrease high fat/carb pastries.     Meal planning tools reviewed and sent via [employer].       Task: Reviewed the sources and role of Carbohydrate, Protein, and Fat and how each nutrient impacts blood sugar. Completed 3/26/2025        Task: Explained how to count carbohydrates using the food label and the use of dry measuring cups for accurate carb counting. Completed 3/26/2025        Task: Provided Sample plate method and reviewed the use of the plate to estimate amounts of carbohydrate per meal. Completed 3/26/2025        Follow Up Plan  Follow up in about 2 months (around 5/26/2025).  -review OP5 reports  -eval goals    Today's care plan and follow up schedule was discussed with patient.  Yolanda verbalized understanding of the care plan, goals, and agrees to follow up plan.        The patient was encouraged to communicate with his/her health care provider/physician and care team regarding his/her condition(s) and treatment.  I provided the patient with my contact information today and encouraged to contact me via phone or [facility] Portal as needed.     Length of Visit   Total Time: 60 Minutes

## 2025-03-27 ENCOUNTER — TELEPHONE (OUTPATIENT)
Dept: PRIMARY CARE CLINIC | Facility: CLINIC | Age: 55
End: 2025-03-27
Payer: MEDICAID

## 2025-03-27 ENCOUNTER — OFFICE VISIT (OUTPATIENT)
Dept: ENDOCRINOLOGY | Facility: CLINIC | Age: 55
End: 2025-03-27
Payer: MEDICAID

## 2025-03-27 VITALS — WEIGHT: 212 LBS | HEIGHT: 56 IN | BODY MASS INDEX: 47.69 KG/M2

## 2025-03-27 DIAGNOSIS — Z79.4 TYPE 2 DIABETES MELLITUS WITH HYPERGLYCEMIA, WITH LONG-TERM CURRENT USE OF INSULIN: Primary | Chronic | ICD-10-CM

## 2025-03-27 DIAGNOSIS — E78.2 MIXED HYPERLIPIDEMIA: ICD-10-CM

## 2025-03-27 DIAGNOSIS — E66.01 CLASS 3 SEVERE OBESITY DUE TO EXCESS CALORIES WITH SERIOUS COMORBIDITY AND BODY MASS INDEX (BMI) OF 40.0 TO 44.9 IN ADULT: ICD-10-CM

## 2025-03-27 DIAGNOSIS — E11.65 TYPE 2 DIABETES MELLITUS WITH HYPERGLYCEMIA, WITH LONG-TERM CURRENT USE OF INSULIN: Primary | Chronic | ICD-10-CM

## 2025-03-27 DIAGNOSIS — E66.813 CLASS 3 SEVERE OBESITY DUE TO EXCESS CALORIES WITH SERIOUS COMORBIDITY AND BODY MASS INDEX (BMI) OF 40.0 TO 44.9 IN ADULT: ICD-10-CM

## 2025-03-27 PROCEDURE — 99215 OFFICE O/P EST HI 40 MIN: CPT | Mod: PBBFAC | Performed by: STUDENT IN AN ORGANIZED HEALTH CARE EDUCATION/TRAINING PROGRAM

## 2025-03-27 PROCEDURE — 99999 PR PBB SHADOW E&M-EST. PATIENT-LVL V: CPT | Mod: PBBFAC,,, | Performed by: STUDENT IN AN ORGANIZED HEALTH CARE EDUCATION/TRAINING PROGRAM

## 2025-03-27 RX ORDER — SEMAGLUTIDE 2.68 MG/ML
2 INJECTION, SOLUTION SUBCUTANEOUS
Start: 2025-03-27 | End: 2026-03-27

## 2025-03-27 NOTE — TELEPHONE ENCOUNTER
I have attempted without success to contact this patient by phone to return their call.       ----- Message from Usha sent at 3/27/2025  9:30 AM CDT -----  Name of Caller:JohnnieYolanda Martin Alpesh Symptoms:itchy skin and urine is neonBest Call Back Number:.[phone]   Additional Information: Patient is requesting a call back regarding her skin is itchy and urine is neon. Call the patient back to advise. Yuriy.EL

## 2025-03-27 NOTE — PROGRESS NOTES
"Subjective:      Patient ID: Yolanda Betts is a 54 y.o. female.    Chief Complaint:  Type 2 diabetes mellitus    History of Present Illness  This is a 54 y.o. female. with a past medical history of Type 2 diabetes mellitus, HTN, HLD, BMI 47 here for evaluation of Type 2 diabetes mellitus.          Type 2 diabetes mellitus    Current diabetes medications:  - Ozempic 2 mg weekly  - Insulin Novolog via Omnipod 5 insulin pump  Basal: 0.6 U/h  ICR 10  ISF 32  Target 110-110  IAT 3h    Past diabetes medications:  - Mounjaro - GI intolerance  - Metformin - intolerance  - Jardiance - yeast infections  - Glimepiride     Lab Results   Component Value Date    CREATININE 0.8 03/16/2025    EGFRNORACEVR >60 03/16/2025       Known diabetic complications: peripheral neuropathy    Weight trend:  Wt Readings from Last 8 Encounters:   03/27/25 96.2 kg (212 lb)   03/18/25 95.2 kg (209 lb 14.1 oz)   03/16/25 92.3 kg (203 lb 8 oz)   03/02/25 91.4 kg (201 lb 6.4 oz)   02/18/25 93.9 kg (207 lb)   02/16/25 90.7 kg (200 lb)   01/03/25 91.2 kg (201 lb)   12/18/24 94.5 kg (208 lb 6.4 oz)         Family history of diabetes:  Yes    Prior visit with diabetes education: Yes    Blood glucose monitoring at home:           Diabetes Management Status  Statin: Taking  ACE/ARB: Not taking    Screening or Prevention Patient's value   HgA1C Testing and Control   Lab Results   Component Value Date    HGBA1C 9.0 (H) 03/18/2025        LDL control Lab Results   Component Value Date    LDLCALC 91.6 11/30/2024      Nephropathy screening Lab Results   Component Value Date    MICALBCREAT 11.5 12/17/2024        Lab Results   Component Value Date    TSH 1.888 12/18/2024       Last eye exam: : 03/08/2024      Review of Systems  As above    Social and family history reviewed  Current medications and allergies reviewed    Objective:   Ht 4' 8" (1.422 m)   Wt 96.2 kg (212 lb)   BMI 47.53 kg/m²   Physical Exam  Alert, oriented    BP Readings from Last 1 " Encounters:   03/18/25 132/80      Wt Readings from Last 1 Encounters:   03/27/25 0835 96.2 kg (212 lb)     Body mass index is 47.53 kg/m².    Lab Review:   Lab Results   Component Value Date    HGBA1C 9.0 (H) 03/18/2025     Lab Results   Component Value Date    CHOL 179 11/30/2024    HDL 46 11/30/2024    LDLCALC 91.6 11/30/2024    TRIG 207 (H) 11/30/2024    CHOLHDL 25.7 11/30/2024     Lab Results   Component Value Date     (L) 03/16/2025    K 5.0 03/16/2025     03/16/2025    CO2 19 (L) 03/16/2025     (H) 03/16/2025    BUN 13 03/16/2025    CREATININE 0.8 03/16/2025    CALCIUM 9.0 03/16/2025    PROT 6.7 03/16/2025    ALBUMIN 3.4 (L) 03/16/2025    BILITOT 0.1 03/16/2025    ALKPHOS 116 03/16/2025    AST 14 03/16/2025    ALT 24 03/16/2025    ANIONGAP 10 03/16/2025    ESTGFRAFRICA >60.0 03/08/2022    EGFRNONAA >60.0 03/08/2022    TSH 1.888 12/18/2024       All pertinent labs reviewed    Assessment and Plan     Type 2 diabetes mellitus with hyperglycemia, with long-term current use of insulin  Glucose uncontrolled with TIR only 20%% and average glucose 250 on CGM. Hyperglycemia is significant  after meals, especially meals with processed carbs. No hypoglycemia.    She is on max dose GLP-1 RA. Did not tolerate GLP-1/GIP RA.    Discussed lifestyle changes in detail including decreasing amount of ultra-processed foods and added sugars. Increase protein and healthy fat intake.     Will switch Omnipod 5 from controller to iPhone gill. I recalculated some settings based on TDD.    Plan  - Continue Ozempic 2 mg weekly  - Continue Novolog via Omnipod 5, new settings:  Basal rate  12A: 1.6 U/h    ICR  12A: 8    ISF  12A: 25    Target: 110-110  IAT: 5h        Class 3 severe obesity with serious comorbidity and body mass index (BMI) of 40.0 to 44.9 in adult  Continue GLP-1 RA given weight loss benefit    Mixed hyperlipidemia  Continue statin  Monitored by PCP      Follow-up in 3 months    Ashish Hidalgo,  MD  Endocrinology

## 2025-03-27 NOTE — ASSESSMENT & PLAN NOTE
Glucose uncontrolled with TIR only 20%% and average glucose 250 on CGM. Hyperglycemia is significant  after meals, especially meals with processed carbs. No hypoglycemia.    She is on max dose GLP-1 RA. Did not tolerate GLP-1/GIP RA.    Discussed lifestyle changes in detail including decreasing amount of ultra-processed foods and added sugars. Increase protein and healthy fat intake.     Will switch Omnipod 5 from controller to iPhone gill. I recalculated some settings based on TDD.    Plan  - Continue Ozempic 2 mg weekly  - Continue Novolog via Omnipod 5, new settings:  Basal rate  12A: 1.6 U/h    ICR  12A: 8    ISF  12A: 25    Target: 110-110  IAT: 5h

## 2025-03-28 ENCOUNTER — PATIENT MESSAGE (OUTPATIENT)
Dept: ENDOCRINOLOGY | Facility: CLINIC | Age: 55
End: 2025-03-28
Payer: MEDICAID

## 2025-03-31 ENCOUNTER — TELEPHONE (OUTPATIENT)
Dept: RHEUMATOLOGY | Facility: CLINIC | Age: 55
End: 2025-03-31
Payer: MEDICAID

## 2025-03-31 ENCOUNTER — TELEPHONE (OUTPATIENT)
Dept: PULMONOLOGY | Facility: CLINIC | Age: 55
End: 2025-03-31
Payer: MEDICAID

## 2025-03-31 ENCOUNTER — TELEPHONE (OUTPATIENT)
Dept: PSYCHIATRY | Facility: CLINIC | Age: 55
End: 2025-03-31
Payer: MEDICAID

## 2025-03-31 ENCOUNTER — TELEPHONE (OUTPATIENT)
Dept: GASTROENTEROLOGY | Facility: CLINIC | Age: 55
End: 2025-03-31

## 2025-03-31 ENCOUNTER — TELEPHONE (OUTPATIENT)
Dept: OBSTETRICS AND GYNECOLOGY | Facility: CLINIC | Age: 55
End: 2025-03-31
Payer: MEDICAID

## 2025-03-31 DIAGNOSIS — J45.40 MODERATE PERSISTENT ASTHMA WITHOUT COMPLICATION: Primary | ICD-10-CM

## 2025-03-31 NOTE — TELEPHONE ENCOUNTER
----- Message from Linda sent at 3/31/2025  1:32 PM CDT -----  Contact: pt [phone]  Type: Needs Medical AdviceWho Called:  Pt Symptoms (please be specific):  Anal itching How long has patient had these symptoms:  1 wk Pharmacy name and phone #:  [facility] [id number] [facility] [address]Ycylx: [phone] Fax: [phone] Call Back Number: [phone] Information: Pt concerned she may have pin worms she would like the nurse to call her back pls. Thank you Symptom: Rectal Symptoms - Not BleedingOutcome: Schedule an appointment to be seen within 3 days.Reason: Caller denied all higher acuity questionsThe caller rejected this outcome.

## 2025-03-31 NOTE — TELEPHONE ENCOUNTER
----- Message from Milagros sent at 3/31/2025 12:44 PM CDT -----  Contact: pt  Pt needs a referral for Migel Salazar and pt doesn't want tog go to [facility] (4/3/2025)Type:  Patient Requesting ReferralWho Called: ptDoes the patient already have the specialty appointment scheduled?: If yes, what is the date of that appointment?: Referral to What Specialty:  OtolaryngologyReason for Referral: Does the patient want the referral with a specific physician?:Is the specialist an [facility]?:Patient Requesting a Response?:Would the patient rather a call back or a response via [facility]?  Phone Best Call Back Number: [phone] Information:

## 2025-03-31 NOTE — TELEPHONE ENCOUNTER
----- Message from Usha sent at 3/31/2025  1:23 PM CDT -----  .Type:   Appointment RequestName of Caller:.Yolanda Betts When is the first available appointment?Best Call Back Number:.[phone]  Additional Information: Patient would like to schedule an appointment with Mariam Yan. EL

## 2025-03-31 NOTE — TELEPHONE ENCOUNTER
Returned call to pt and pt is complaining of Anal itching. Pt stated she is having internal and exernal itching. Pt stated she has not tried anything as of now. Denies bleeding or pain. Pt would like to know what can she do. Next available appointment isnt until June. Please advise.

## 2025-03-31 NOTE — TELEPHONE ENCOUNTER
----- Message from Usha sent at 3/31/2025  1:17 PM CDT -----  .Type:   Appointment RequestName of Caller:.Yolanda Betts When is the first available appointment?Symptoms: dischargeBest Call Back Number:.[phone]  Additional Information: Patient would like to schedule an appointment for discharge. Thx. EL

## 2025-04-01 ENCOUNTER — TELEPHONE (OUTPATIENT)
Dept: HEMATOLOGY/ONCOLOGY | Facility: CLINIC | Age: 55
End: 2025-04-01
Payer: MEDICAID

## 2025-04-01 ENCOUNTER — HOSPITAL ENCOUNTER (OUTPATIENT)
Dept: RADIOLOGY | Facility: HOSPITAL | Age: 55
Discharge: HOME | End: 2025-04-01
Attending: INTERNAL MEDICINE
Payer: MEDICAID

## 2025-04-01 ENCOUNTER — CLINICAL SUPPORT (OUTPATIENT)
Dept: PULMONOLOGY | Facility: CLINIC | Age: 55
End: 2025-04-01
Attending: INTERNAL MEDICINE
Payer: MEDICAID

## 2025-04-01 ENCOUNTER — TELEPHONE (OUTPATIENT)
Dept: PRIMARY CARE CLINIC | Facility: CLINIC | Age: 55
End: 2025-04-01
Payer: MEDICAID

## 2025-04-01 ENCOUNTER — OFFICE VISIT (OUTPATIENT)
Dept: GASTROENTEROLOGY | Facility: CLINIC | Age: 55
End: 2025-04-01
Payer: MEDICAID

## 2025-04-01 VITALS
HEART RATE: 77 BPM | RESPIRATION RATE: 16 BRPM | DIASTOLIC BLOOD PRESSURE: 78 MMHG | HEIGHT: 56 IN | SYSTOLIC BLOOD PRESSURE: 104 MMHG | WEIGHT: 202 LBS | OXYGEN SATURATION: 98 % | BODY MASS INDEX: 45.44 KG/M2

## 2025-04-01 VITALS — HEIGHT: 56 IN | BODY MASS INDEX: 45.44 KG/M2 | WEIGHT: 202 LBS

## 2025-04-01 DIAGNOSIS — K59.04 CHRONIC IDIOPATHIC CONSTIPATION: ICD-10-CM

## 2025-04-01 DIAGNOSIS — E66.813 CLASS 3 SEVERE OBESITY DUE TO EXCESS CALORIES WITH SERIOUS COMORBIDITY AND BODY MASS INDEX (BMI) OF 40.0 TO 44.9 IN ADULT: ICD-10-CM

## 2025-04-01 DIAGNOSIS — D50.8 IRON DEFICIENCY ANEMIA SECONDARY TO INADEQUATE DIETARY IRON INTAKE: ICD-10-CM

## 2025-04-01 DIAGNOSIS — R06.09 DOE (DYSPNEA ON EXERTION): ICD-10-CM

## 2025-04-01 DIAGNOSIS — G47.61 PLMD (PERIODIC LIMB MOVEMENT DISORDER): ICD-10-CM

## 2025-04-01 DIAGNOSIS — J45.40 MODERATE PERSISTENT ASTHMA WITHOUT COMPLICATION: ICD-10-CM

## 2025-04-01 DIAGNOSIS — K64.9 HEMORRHOIDS, UNSPECIFIED HEMORRHOID TYPE: ICD-10-CM

## 2025-04-01 DIAGNOSIS — G47.11 IDIOPATHIC HYPERSOMNOLENCE: ICD-10-CM

## 2025-04-01 DIAGNOSIS — G47.33 OBSTRUCTIVE SLEEP APNEA: ICD-10-CM

## 2025-04-01 DIAGNOSIS — R94.2 DIFFUSION CAPACITY OF LUNG (DL), DECREASED: Primary | Chronic | ICD-10-CM

## 2025-04-01 DIAGNOSIS — G47.36 NOCTURNAL HYPOXEMIA DUE TO OBESITY: Chronic | ICD-10-CM

## 2025-04-01 DIAGNOSIS — E66.9 NOCTURNAL HYPOXEMIA DUE TO OBESITY: Chronic | ICD-10-CM

## 2025-04-01 DIAGNOSIS — L29.0 ANAL ITCHING: ICD-10-CM

## 2025-04-01 DIAGNOSIS — R06.00 DYSPNEA DUE TO COVID-19: Chronic | ICD-10-CM

## 2025-04-01 DIAGNOSIS — R06.83 SNORING: ICD-10-CM

## 2025-04-01 DIAGNOSIS — D50.9 IDA (IRON DEFICIENCY ANEMIA): Primary | ICD-10-CM

## 2025-04-01 DIAGNOSIS — U07.1 DYSPNEA DUE TO COVID-19: Chronic | ICD-10-CM

## 2025-04-01 DIAGNOSIS — E66.01 CLASS 3 SEVERE OBESITY DUE TO EXCESS CALORIES WITH SERIOUS COMORBIDITY AND BODY MASS INDEX (BMI) OF 40.0 TO 44.9 IN ADULT: ICD-10-CM

## 2025-04-01 LAB
BRPFT: ABNORMAL
FEF 25 75 CHG: 10.5 %
FEF 25 75 LLN: 1.56
FEF 25 75 POST REF: 57.6 %
FEF 25 75 PRE REF: 52.1 %
FEF 25 75 REF: 2.96
FET100 CHG: 2.1 %
FEV1 CHG: 4.2 %
FEV1 FVC CHG: 0.3 %
FEV1 FVC LLN: 70
FEV1 FVC POST REF: 101.4 %
FEV1 FVC PRE REF: 101.1 %
FEV1 FVC REF: 82
FEV1 LLN: 1.36
FEV1 POST REF: 79.7 %
FEV1 PRE REF: 76.5 %
FEV1 REF: 1.82
FVC CHG: 3.8 %
FVC LLN: 1.68
FVC POST REF: 78.6 %
FVC PRE REF: 75.7 %
FVC REF: 2.22
PEF CHG: -24.1 %
PEF LLN: 3.42
PEF POST REF: 59.6 %
PEF PRE REF: 78.5 %
PEF REF: 5.01
POST FEF 25 75: 1.71 L/S (ref 1.56–4.36)
POST FET 100: 6.74 SEC
POST FEV1 FVC: 82.78 % (ref 70.48–91.11)
POST FEV1: 1.45 L (ref 1.36–2.25)
POST FVC: 1.75 L (ref 1.68–2.79)
POST PEF: 2.98 L/S (ref 3.42–6.6)
PRE FEF 25 75: 1.54 L/S (ref 1.56–4.36)
PRE FET 100: 6.61 SEC
PRE FEV1 FVC: 82.5 % (ref 70.48–91.11)
PRE FEV1: 1.39 L (ref 1.36–2.25)
PRE FVC: 1.68 L (ref 1.68–2.79)
PRE PEF: 3.93 L/S (ref 3.42–6.6)

## 2025-04-01 PROCEDURE — 1159F MED LIST DOCD IN RCRD: CPT | Mod: CPTII,,,

## 2025-04-01 PROCEDURE — 1160F RVW MEDS BY RX/DR IN RCRD: CPT | Mod: CPTII,,,

## 2025-04-01 PROCEDURE — 3074F SYST BP LT 130 MM HG: CPT | Mod: CPTII,,,

## 2025-04-01 PROCEDURE — 94060 EVALUATION OF WHEEZING: CPT | Mod: PBBFAC

## 2025-04-01 PROCEDURE — 3008F BODY MASS INDEX DOCD: CPT | Mod: CPTII,,,

## 2025-04-01 PROCEDURE — 71046 X-RAY EXAM CHEST 2 VIEWS: CPT | Mod: TC

## 2025-04-01 PROCEDURE — 99215 OFFICE O/P EST HI 40 MIN: CPT | Mod: PBBFAC,25

## 2025-04-01 PROCEDURE — 3078F DIAST BP <80 MM HG: CPT | Mod: CPTII,,,

## 2025-04-01 PROCEDURE — 99214 OFFICE O/P EST MOD 30 MIN: CPT | Mod: S$PBB,25,,

## 2025-04-01 PROCEDURE — 94060 EVALUATION OF WHEEZING: CPT | Mod: 26,S$PBB,, | Performed by: INTERNAL MEDICINE

## 2025-04-01 PROCEDURE — 3052F HG A1C>EQUAL 8.0%<EQUAL 9.0%: CPT | Mod: CPTII,,,

## 2025-04-01 PROCEDURE — 71046 X-RAY EXAM CHEST 2 VIEWS: CPT | Mod: 26,,, | Performed by: RADIOLOGY

## 2025-04-01 PROCEDURE — 99999 PR PBB SHADOW E&M-EST. PATIENT-LVL V: CPT | Mod: PBBFAC,,,

## 2025-04-01 PROCEDURE — 3072F LOW RISK FOR RETINOPATHY: CPT | Mod: CPTII,,,

## 2025-04-01 RX ORDER — HYDROCORTISONE 25 MG/G
CREAM TOPICAL DAILY
Qty: 30 G | Refills: 2 | Status: SHIPPED | OUTPATIENT
Start: 2025-04-01 | End: 2025-04-11

## 2025-04-01 NOTE — TELEPHONE ENCOUNTER
Spoke to patient in reference to Hematology referral from Ronny Melgoza PA-C. Appointment scheduled per patient's request next available at the [place]. Appointment notice via pt portal.

## 2025-04-01 NOTE — ASSESSMENT & PLAN NOTE
Current BMI There is no height or weight on file to calculate BMI.    Using/Tried   Medication:    Diet:    Exercise:    Weight lose: Not - Intentional   Weight gain: Not - Intentional    Discussed:  - Weight loss to reduce the burden of ALEN   - Risk factors associated with obesity: Arthritis/ALEN/Diabetes/Fatty Liver/Cardiovascular disease/GERD/HTN/HLP.  - Lifestyle changes were discussed on eating healthy, exercising at least 150 minutes weekly, and reducing sedentary behavior.   - Patient would benefit from weight loss and has tried to set realistic goals to achieve success.     Plan:  - Reduced-calorie diet   - Increased physical activity

## 2025-04-01 NOTE — PROGRESS NOTES
Subjective:      Patient ID: Yolanda Betts is a 54 y.o. female.    Chief Complaint: Sleep Apnea      04/01/2025  Yolanda Betts 54 y.o.   New to me   Last seen on   Here for Spirometry & ALEN follow up    Te Manley MD Neurology- Plan for stay in EMU unit   Sleep apnea:  Reports using the CPAP device but wakes up with it off usually - does not recall removing device  Concern with mask fit, says device straps bunches together, leaks noted- wears re  Bedtime 8:30 pm  Wake time 6-7 am   Was sleep walking & eating -has stopped since by not buying the foods she would eat (cereal, sandwiches)  Says she has started the melatonin and topamax.  Ambien also  Recently started Ozempic again    Asthma:  Spirometry done today and reviewed  Occasional wheezing, cough    Symptoms per week:   Interfere w/ normal activity:  Nighttime awakening:  Rescue inhaler use freq:  Lung function (FEV1 >80%, FEV1/FEV):  Exacerbation this year, steroid Rx #:  Side Effects:  Patient Satisfaction w Asthma/Tx:  Symptom: Wheeze cough SOB chest tightness  Frequency:  Severity: Mild Moderate Severe  When: Night Day Outdoor Activity Exercise  ER Visits/Hospitalized   Allergies    Download today:  02/24/2025 - 03/25/2025  Usage days 24/30 days (80%)  >= 4 hours 7 days (23%)  < 4 hours 17 days (57%)  AHI= 5.8    concerns for narcolepsy, reports narcolepsy history, did not start medications  Complains of legs buckling with emotional changes, leg weakness, unable to stay awake sometimes, she says she has fallen off a treadmill recently due to this  Uses oxygen 2L at night  Taking cymbalta, latuda, klonapin at night for bipolar - recently decreased these medications a few weeks ago, tapering down - followed by Dr Mariam Young  Also with history of fibromyalgia  Recent weight loss, gastric sleeve 6/4/2021  Dx of circadian rhythm disorder controlled with working on sleep hygiene per last pulm note  PSG in 2020 did not reveal sleep apnea,  overnight oxygen testing revealed hypoxia     Also with history of asthma  Stable on Symbicort daily and albuterol prn  No signs of infection or exacerbation  Added melatonin plus Topamax for sleepwalking sleep eating parasomnia  6 ER and hospital encounter last 3 months       Pertinent Work Up:    Pulmonary Interventions:  Smoking hx:  Environmental/Occupational hx:    Review of Systems   Constitutional:  Positive for fatigue. Negative for fever, chills, activity change and night sweats.   HENT:  Positive for congestion. Negative for trouble swallowing.    Respiratory:  Positive for cough, wheezing, asthma nighttime symptoms, dyspnea on extertion and somnolence. Negative for apnea, snoring, hemoptysis, sputum production, chest tightness and use of rescue inhaler.    Musculoskeletal:  Positive for arthralgias.   Gastrointestinal:  Negative for acid reflux.   Neurological:  Positive for headaches. Negative for syncope.   Hematological:  Does not bruise/bleed easily and no excessive bruising.   Psychiatric/Behavioral:  Negative for sleep disturbance.      Interval HPI History:  2/18/25 Te Manley MD  Neurology  Assessment:   Yolanda Betts is a 54 y.o. female with extensive history including migraine, diabetic neuropathy, carpal tunnel, schizoaffective disorder, bipolar 1, sdh, htn, hld, and obesity presenting for new seizures that started Nov 29, 2024. Exam notable for depressed/apathetic affect and intermittent drowsiness. I am concerned about polypharmacy and recommend reducing medication list as appropriate per pcp. History is less suspicious for epilepsy but more suggestive of non epileptic events. Recommend further characterization in the epilepsy monitoring unit. If more consistent with PNEE then will get neuropsychology involved.      Plan:      - Recommend EMU monitoring   - Continue Keppra 750 mg BID  - Counseled patient about seizure precautions   - Consider reducing medication list due to  concerns of polypharmacy  - RTC after EMU within 4 months      Problem List Items Addressed This Visit    None  Visit Diagnoses           Seizure-like activity    -  Primary     Relevant Orders     EMU Monitoring     Problem List[1]   Past Medical History:   Diagnosis Date    Abnormal Pap smear of cervix     HPV genital warts    Anemia     Anxiety     Arthritis     Asthma 10/11/2016    Bipolar 1 disorder     Diabetes mellitus, type 2     Dyslipidemia associated with type 2 diabetes mellitus 05/13/2019    Dyspnea due to COVID-19 09/12/2024    General anesthetics causing adverse effect in therapeutic use     Genital warts     GERD (gastroesophageal reflux disease)     Herpes simplex virus (HSV) infection     Hyperlipidemia     Hypertension     Hypertension complicating diabetes 05/05/2019    Migraine with aura and without status migrainosus, not intractable 03/21/2016    Mild persistent asthma without complication 10/11/2016    Morbid obesity with body mass index (BMI) of 45.0 to 49.9 in adult 08/03/2017    Myoclonus     Obstructive sleep apnea     ALEN (obstructive sleep apnea)     2L per N/C q HS    Schizoaffective disorder, bipolar type 04/25/2019    Seasonal allergic rhinitis due to pollen 05/13/2019    Seizures     Type 2 diabetes mellitus with hyperglycemia, with long-term current use of insulin 12/21/2017    Type 2 diabetes mellitus with hyperglycemia, without long-term current use of insulin 12/21/2017     Past Surgical History:   Procedure Laterality Date    abcess removed right back      ANGIOGRAM, CORONARY, WITH LEFT HEART CATHETERIZATION N/A 9/27/2024    Procedure: Angiogram, Coronary, with Left Heart Cath;  Surgeon: Jaimie Wills MD;  Location: [facility] CATH LAB;  Service: Cardiology;  Laterality: N/A;    BELT ABDOMINOPLASTY  1996    BREAST SURGERY Bilateral 1996    Reduction    CARPAL TUNNEL RELEASE Bilateral 12/18/2023    Procedure: RELEASE, CARPAL TUNNEL;  Surgeon:  Neville Roth MD;  Location: [facility] OR;  Service: Orthopedics;  Laterality: Bilateral;  bilateral carpal tunnel release     SECTION      X 2    COLONOSCOPY      COLONOSCOPY N/A 2018    Procedure: colonoscopy for iron deficiency anemia;  Surgeon: Frankie Sanchez MD;  Location: [facility];  Service: Endoscopy;  Laterality: N/A;    COLONOSCOPY N/A 2018    Procedure: COLONOSCOPY;  Surgeon: Frankie Sanchez MD;  Location: [facility] ENDO;  Service: Endoscopy;  Laterality: N/A;    COLONOSCOPY N/A 3/10/2023    Procedure: COLONOSCOPY;  Surgeon: Lalita Montes De Oca MD;  Location: [facility] ENDO;  Service: Endoscopy;  Laterality: N/A;    COLONOSCOPY N/A 2024    Procedure: COLONOSCOPY;  Surgeon: Tara Og MD;  Location: [facility] ENDO;  Service: Endoscopy;  Laterality: N/A;    EPIDURAL STEROID INJECTION INTO CERVICAL SPINE N/A 2023    Procedure: C6/7 IL ROCAEL RN IV Sedation;  Surgeon: Otto Phillips MD;  Location: [facility] PAIN MGT;  Service: Pain Management;  Laterality: N/A;    EPIDURAL STEROID INJECTION INTO CERVICAL SPINE N/A 2024    Procedure: C5/6 IL ROCAEL;  Surgeon: Otto Phillips MD;  Location: [facility] PAIN MGT;  Service: Pain Management;  Laterality: N/A;    ESOPHAGOGASTRODUODENOSCOPY N/A 3/10/2023    Procedure: EGD (ESOPHAGOGASTRODUODENOSCOPY);  Surgeon: Lalita Montes De Oca MD;  Location: [facility];  Service: Endoscopy;  Laterality: N/A;    HYSTERECTOMY      w/ BSO; hypermenorrhea    INJECTION OF ANESTHETIC AGENT AROUND MEDIAL BRANCH NERVES INNERVATING CERVICAL FACET JOINT Bilateral 2023    Procedure: Bilateral C5-7 MBB (diagnostic);  Surgeon: Otto Phillips MD;  Location: [facility] PAIN MGT;  Service: Pain Management;  Laterality: Bilateral;    MOUTH SURGERY  1996    OOPHORECTOMY      hyst/bso, hypermenorrhea    ROBOT-ASSISTED CHOLECYSTECTOMY USING DA DARI XI N/A 1/3/2020    Procedure: XI ROBOTIC CHOLECYSTECTOMY;  Surgeon: Damir Driscoll MD;  Location: [facility];   "Service: General;  Laterality: N/A;    TOTAL REDUCTION MAMMOPLASTY      TUBAL LIGATION        Review of patient's allergies indicates:   Allergen Reactions    Codeine Itching    Hydromorphone Other (See Comments)     Can't wake up for long time if taken    Slow to wake up after surgery after receiving    Aleve [naproxen sodium]      Increases BP    Ibuprofen      Increases BP    Neuromuscular blockers, steroidal Hives     some    Pregabalin Itching    Latex, natural rubber Rash    Morphine Rash     itching    Norco [hydrocodone-acetaminophen] Itching, Rash and Hallucinations    Secobarbital sodium Rash     itching    Tylox [oxycodone-acetaminophen] Rash      Tobacco Use: Low Risk  (4/1/2025)    Patient History     Smoking Tobacco Use: Never     Smokeless Tobacco Use: Never     Passive Exposure: Never      Current Outpatient Medications   Medication Sig    acetaminophen (TYLENOL) 325 MG tablet Take 650 mg by mouth every 6 (six) hours as needed.    albuterol (PROVENTIL) 2.5 mg /3 mL (0.083 %) nebulizer solution Take 2.5 mg by nebulization every 4 (four) hours as needed. Times a day    albuterol (PROVENTIL/VENTOLIN HFA) 90 mcg/actuation inhaler Inhale 2 puffs into the lungs every 6 (six) hours as needed for Wheezing.    ALPRAZolam (XANAX) 1 MG tablet Take 1 tablet (1 mg total) by mouth 2 (two) times daily as needed for Anxiety.    amLODIPine (NORVASC) 10 MG tablet Take 1 tablet (10 mg total) by mouth once daily.    atorvastatin (LIPITOR) 20 MG tablet Take 1 tablet (20 mg total) by mouth every evening    BD INSULIN SYRINGE ULTRA-FINE 1/2 mL 30 gauge x 1/2" Syrg     blood-glucose sensor (DEXCOM G6 SENSOR) Mariela change sensor every 10 days.    blood-glucose transmitter (DEXCOM G6 TRANSMITTER) Mariela 1 each by Misc.(Non-Drug; Combo Route) route every 3 (three) months.    cholecalciferol, vitamin D3, 1,250 mcg (50,000 unit) Tab Take 1 capsule by mouth every 7 days.    cyclobenzaprine (FLEXERIL) 10 MG " "tablet Take 1 tablet (10 mg total) by mouth 3 (three) times daily as needed (Pain).    DULoxetine (CYMBALTA) 60 MG capsule Take 1 capsule by mouth 2 times daily    ergocalciferol (ERGOCALCIFEROL) 50,000 unit Cap Take 1 capsule (50,000 Units total) by mouth every 7 days.    fenofibrate (TRICOR) 145 MG tablet Take 1 tablet by mouth in the morning.    fluticasone propionate (FLONASE) 50 mcg/actuation nasal spray Take 1 spray by Each Nostril route in the morning.    folic acid (FOLVITE) 1 MG tablet Take 1 tablet by mouth every day in the morning.    furosemide (LASIX) 20 MG tablet Take 1 tablet (20 mg total) by mouth once daily.    glucagon (BAQSIMI) 3 mg/actuation [place] SPRAY 1 SPRAY IN NOSTRIL AS NEEDED FOR EMERGENCY. MAY REPEAT 1 TIME AFTER 15 MINUTES    insulin aspart U-100 (NOVOLOG FLEXPEN U-100 INSULIN) 100 unit/mL (3 mL) InPn pen Inject 9 Units into the skin 3 (three) times daily before meals. Use sliding scale for small medium and large meals    insulin aspart U-100 (NOVOLOG U-100 INSULIN ASPART) 100 unit/mL injection To use via insulin pump. Up to 200 units every 2 days    insulin glargine U-100, Lantus, (LANTUS SOLOSTAR U-100 INSULIN) 100 unit/mL (3 mL) InPn pen Inject 16 Units into the skin once daily. Use in the event of pump failure    insulin pump cart,auto,BT,G6/7 (OMNIPOD 5 G6-G7 PODS, GEN 5,) Crtg 1 each by Misc.(Non-Drug; Combo Route) route every 48 hours.    insulin syringe-needle U-100 0.3 mL 30 gauge x 5/16" Syrg 1 each by Misc.(Non-Drug; Combo Route) route Every 3 (three) days.    lamoTRIgine (LAMICTAL) 100 MG tablet Take 1 tablet (100 mg total) by mouth every morning.    levETIRAcetam (KEPPRA) 500 MG Tab Take 1.5 tablets (750 mg total) by mouth 2 (two) times daily.    levocetirizine (XYZAL) 5 MG tablet Take 1 tablet (5 mg total) by mouth every evening.    LIDOcaine (LIDODERM) 5 % Place 2 patches onto the skin every 12 (twelve) hours as needed (pain). Remove & Discard patch within 12 " "hours or as directed by MD    lifitegrast (XIIDRA) 5 % Dpet Place 1 drop into both eyes 2 (two) times daily.    magnesium oxide (MAG-OX) 400 mg (241.3 mg magnesium) tablet Take 1 tablet (400 mg total) by mouth once daily.    melatonin (MELATIN) 3 mg tablet Take 2 tablets (6 mg total) by mouth nightly.    metoprolol succinate (TOPROL-XL) 50 MG 24 hr tablet Take 1 tablet (50 mg total) by mouth every morning.    omeprazole (PRILOSEC) 40 MG capsule Take 1 capsule (40 mg total) by mouth once daily.    ondansetron (ZOFRAN) 4 MG tablet Take 4 mg by mouth every 6 (six) hours.    OZEMPIC 2 mg/dose (8 mg/3 mL) PnIj Inject 2 mg into the skin every 7 days.    pen needle, diabetic (BD ULTRA-FINE MINI PEN NEEDLE) 31 gauge x 3/16" Ndle Use 1 a day in event of pump failure    polyethylene glycol (GLYCOLAX) 17 gram/dose powder Take 17 g by mouth 2 (two) times daily.    secukinumab (COSENTYX PEN, 2 PENS,) 150 mg/mL PnIj Inject 300 mg into the skin every 14 (fourteen) days.    SYMBICORT 160-4.5 mcg/actuation HFAA Inhale 2 puffs into the lungs in the morning and 2 puffs before bedtime. 2 puffs every 12 hours    topiramate (TOPAMAX) 25 MG tablet Take 1 tablet (25 mg total) by mouth every evening.    triamcinolone acetonide 0.1% (KENALOG) 0.1 % paste Apply coating 2 times daily    vitamin D3-vitamin K2 1,250-200 mcg Cap Take by mouth.    zolpidem (AMBIEN CR) 6.25 MG CR tablet Take 1 tablet (6.25 mg total) by mouth nightly as needed for Insomnia.    estradioL (ESTRACE) 0.01 % (0.1 mg/gram) vaginal cream Place 1 g vaginally twice a week.    nystatin (MYCOSTATIN) 100,000 unit/mL suspension Take 4 mLs (400,000 Units total) by mouth 4 (four) times daily. for 10 days   Last reviewed on 4/1/2025  9:21 AM by Sandeep Sy LPN      Objective:   There were no vitals filed for this visit.   Last 3 sets of Vitals        3/16/2025    11:25 AM 3/18/2025     8:21 AM 3/27/2025     8:35 AM   Vitals - 1 value per visit   SYSTOLIC  " "132    DIASTOLIC  80    Pulse  87    Temp 99.7 °F (37.6 °C)     Resp  18    SPO2  98 %    Weight (lb)  209.88 212   Weight (kg)  95.2 96.163   Height  4' 8" (1.422 m) 4' 8" (1.422 m)   BMI (Calculated)  47.1 47.6      There is no height or weight on file to calculate BMI.   Wt Readings from Last 3 Encounters:   03/27/25 96.2 kg (212 lb)   03/18/25 95.2 kg (209 lb 14.1 oz)   03/16/25 92.3 kg (203 lb 8 oz)        Physical Exam   Constitutional: She is oriented to person, place, and time. She appears well-nourished. She is cooperative. She does not appear ill. No distress.   HENT:   Head: Normocephalic and atraumatic.   Right Ear: Hearing and external ear normal.   Left Ear: Hearing and external ear normal.   Nose: Nose normal.   Mouth/Throat: Uvula is midline and oropharynx is clear and moist. Mucous membranes are moist. Normal dentition. No oropharyngeal exudate. Oropharynx is clear. Mallampati Score: I.   Neck: Trachea normal and phonation normal. No tracheal deviation present.   Cardiovascular: Normal rate, regular rhythm, S1 normal, S2 normal, normal heart sounds, intact distal pulses and normal pulses.   Pulmonary/Chest: Normal expansion, symmetric chest wall expansion, effort normal and breath sounds normal. There is normal air entry. No accessory muscle usage or stridor. No tachypnea. No respiratory distress. Air movement is not decreased. She has no decreased breath sounds. She has no wheezes. She has no rhonchi. She has no rales. She exhibits no retraction.   Abdominal:   Large panus     Musculoskeletal:         General: Normal range of motion.      Cervical back: Normal range of motion and neck supple.      Right lower leg: No edema.      Left lower leg: No edema.   Lymphadenopathy: No supraclavicular adenopathy is present.     She has no cervical adenopathy.     She has no axillary adenopathy.        Right: No supraclavicular adenopathy present.        Left: No supraclavicular adenopathy present. " "  Neurological: She is alert and oriented to person, place, and time. She has normal motor skills. Gait normal.   Skin: Skin is warm and dry. Capillary refill takes less than 2 seconds. No cyanosis. No pallor. Nails show no clubbing.   Psychiatric: Her speech is normal and behavior is normal. Mood and thought content normal.   Vitals reviewed.        3/27/2025     8:35 AM 3/18/2025     8:21 AM 3/16/2025     2:00 PM 3/16/2025     1:30 PM 3/16/2025    11:00 AM 3/2/2025     1:00 PM 3/2/2025    11:45 AM   Pulmonary Function Tests   SpO2  98 % 100 % 98 % 100 % 100 % 100 %   Height 4' 8" (1.422 m) 4' 8" (1.422 m)   4' 8" (1.422 m)     Weight 96.2 kg (212 lb) 95.2 kg (209 lb 14.1 oz)   92.3 kg (203 lb 8 oz)     BMI (Calculated) 47.6 47.1   45.6       X-Ray Chest PA And Lateral  Narrative: EXAMINATION:  XR CHEST PA AND LATERAL    CLINICAL HISTORY:  Other forms of dyspnea    TECHNIQUE:  PA and lateral views of the chest were performed.    COMPARISON:  03/16/2025    FINDINGS:  The lungs are clear and free of infiltrate.  No pleural effusion or pneumothorax. The heart is enlarged.  There is tortuosity of the descending thoracic aorta.  Impression: 1.  No acute cardiopulmonary process.    Electronically signed by: Nimesh Martinez DO  Date:    04/01/2025  Time:    09:07      X-Ray Chest PA And Lateral  Narrative: EXAMINATION:  XR CHEST PA AND LATERAL    CLINICAL HISTORY:  Other forms of dyspnea    TECHNIQUE:  PA and lateral views of the chest were performed.    COMPARISON:  03/16/2025    FINDINGS:  The lungs are clear and free of infiltrate.  No pleural effusion or pneumothorax. The heart is enlarged.  There is tortuosity of the descending thoracic aorta.  Impression: 1.  No acute cardiopulmonary process.    Electronically signed by: Nimesh Martinez DO  Date:    04/01/2025  Time:    09:07           PFT    Lab Results   Component Value Date    BRPFT  08/07/2024     Spirometry is normal. Spirometry remains unimproved following " bronchodilator.   Â   Notes:  Â   The failure to demonstrate improvement in spirometry does not preclude a clinical response to a trial of bronchodilators.       [facility]  10/27/2020     Spirometry shows mild restriction based on the reduction in FVC.   Notes: Consider lung volume determination to confirm the degree of restriction.       [facility]  01/20/2020       Normal spirometry. (FEV1/VC greater than or equal to LLN and FVC greater than or equal to LLN)  Normal airflow. (FEV1/VC greater than or equal to LLN)  Airflow is not clearly improved after bronchodilator. Clinical improvement following bronchodilator therapy may occur in the absence of spirometric improvement.            Personal Diagnostic Review  Imaging   Spirometry   HST      Assessment/Plan:     1. Diffusion capacity of lung (dl), decreased    2. Moderate persistent asthma without complication    3. Dyspnea due to COVID-19    4. Class 3 severe obesity due to excess calories with serious comorbidity and body mass index (BMI) of 40.0 to 44.9 in adult  Assessment & Plan:  Current BMI There is no height or weight on file to calculate BMI.    Using/Tried   Medication:    Diet:    Exercise:    Weight lose: Not - Intentional   Weight gain: Not - Intentional    Discussed:  - Weight loss to reduce the burden of ALEN   - Risk factors associated with obesity: Arthritis/ALEN/Diabetes/Fatty Liver/Cardiovascular disease/GERD/HTN/HLP.  - Lifestyle changes were discussed on eating healthy, exercising at least 150 minutes weekly, and reducing sedentary behavior.   - Patient would benefit from weight loss and has tried to set realistic goals to achieve success.     Plan:  - Reduced-calorie diet   - Increased physical activity      5. Snoring    6. Nocturnal hypoxemia due to obesity - WITHOUT ALEN    7. Obstructive sleep apnea  Overview:  9/1/24 1 Night HST: Moderate ALEN AHI=12 RDI=22    APAP Settings: 5-20 cm H2O   Mask     HME: [facility]     Assessment & Plan:    - [place]  Sleepiness Scale TOTAL =   (validated sleepiness questionnaire with a higher score indicating greater sleepiness; range 0-24)  - APAP Settings: 5-20 cm H2O   Mask     HME: [facility]     - Using and benefits per patient  -Compliant data  - Optimal control of ALEN with AHI=5.8  - Discussed barriers to usage         - Therapeutic goals for positive airway pressure therapy(CPAP or BiPAP):  Ideal is usage 100% of nights for at least 6 hours per night  Minimum usage is 70% of night for at least 4 hours per night used    Plan:    Follow up   device download.        8. PLMD (periodic limb movement disorder)    9. Idiopathic hypersomnolence           Encounter Medications[2]  No orders of the defined types were placed in this encounter.      Discussed diagnosis, its evaluation, treatment and usual course. All questions answered.     Patient verbalized understanding of plan and left in no acute distress.    No follow-ups on file.    Note has been documented by KELI Núñez 4/1/2025   Thank you for allowing me to participate in the care of this patient,    KELI Núñez   Pulmonary Disease           [1]  Patient Active Problem List  Diagnosis    Essential hypertension    GERD (gastroesophageal reflux disease)    Menopausal syndrome (hot flashes)    Migraine with aura and without status migrainosus, not intractable    Abnormal ECG    Sleep-related bruxism    Diabetic polyneuropathy associated with type 2 diabetes mellitus    Moderate persistent asthma without complication    Bipolar 1 disorder    Diffusion capacity of lung (dl), decreased    Hypertriglyceridemia    Generalized anxiety disorder    Type 2 diabetes mellitus with hyperglycemia, with long-term current use of insulin    Iron deficiency anemia    Schizoaffective disorder, bipolar type    Vitamin D deficiency    Fibromyalgia    Hypertension complicating diabetes    Dyslipidemia associated with type 2 diabetes mellitus    Seasonal  allergic rhinitis due to pollen    Nocturnal hypoxemia due to obesity - WITHOUT ALEN    Hyperaldosteronism    BMI 40.0-44.9, adult    High risk of appointment cancellation or no-show    Irritable bowel syndrome with both constipation and diarrhea    Right shoulder tendonitis    Chronic neck and back pain    Proximal muscle weakness    Poor posture    Snoring    Circadian rhythm disorder    Hypersomnolence disorder    Fatty liver    Bilateral carpal tunnel syndrome    Type 2 diabetes mellitus    History of anorexia nervosa    Bipolar disord, crnt epsd depress, severe, w psych features    Sleep problem caused by drug    DM type 2 with diabetic peripheral neuropathy    PLMD (periodic limb movement disorder)    Idiopathic hypersomnolence    Chronic idiopathic constipation    Class 3 severe obesity with serious comorbidity and body mass index (BMI) of 40.0 to 44.9 in adult    Mixed hyperlipidemia    Counseling for hormone replacement therapy    Candida glabrata infection    Preop examination    Ankylosing spondylitis    Dysuria    Subacute vaginitis    Chronic constipation    Plantar fasciitis, bilateral    Hyperglycemia    Decreased strength, endurance, and mobility    Decreased range of motion of both ankles    Bilateral foot pain    Generalized abdominal pain    Decreased  strength    Decreased activities of daily living (ADL)    Pain    Chronic bilateral low back pain with bilateral sciatica    Weakness of both lower extremities    Decreased mobility and endurance    Dyspnea due to COVID-19    Hepatomegaly    DAVIS (nonalcoholic steatohepatitis)    Drug-induced immunodeficiency    Encounter for medication monitoring    Sclerosis of sacroiliac joint    Obstructive sleep apnea    SDH (subdural hematoma)    Abnormal head movements   [2]  Outpatient Encounter Medications as of 4/1/2025   Medication Sig Dispense Refill    acetaminophen (TYLENOL) 325 MG tablet Take 650  "mg by mouth every 6 (six) hours as needed.      albuterol (PROVENTIL) 2.5 mg /3 mL (0.083 %) nebulizer solution Take 2.5 mg by nebulization every 4 (four) hours as needed. Times a day 75 mL 1    albuterol (PROVENTIL/VENTOLIN HFA) 90 mcg/actuation inhaler Inhale 2 puffs into the lungs every 6 (six) hours as needed for Wheezing. 8.5 g 1    ALPRAZolam (XANAX) 1 MG tablet Take 1 tablet (1 mg total) by mouth 2 (two) times daily as needed for Anxiety. 60 tablet 2    amLODIPine (NORVASC) 10 MG tablet Take 1 tablet (10 mg total) by mouth once daily. 30 tablet 11    atorvastatin (LIPITOR) 20 MG tablet Take 1 tablet (20 mg total) by mouth every evening 90 tablet 3    BD INSULIN SYRINGE ULTRA-FINE 1/2 mL 30 gauge x 1/2" Syrg   0    blood-glucose sensor (DEXCOM G6 SENSOR) Mariela change sensor every 10 days. 3 each 11    blood-glucose transmitter (DEXCOM G6 TRANSMITTER) Mariela 1 each by Misc.(Non-Drug; Combo Route) route every 3 (three) months. 1 each 3    cholecalciferol, vitamin D3, 1,250 mcg (50,000 unit) Tab Take 1 capsule by mouth every 7 days. 12 tablet 3    cyclobenzaprine (FLEXERIL) 10 MG tablet Take 1 tablet (10 mg total) by mouth 3 (three) times daily as needed (Pain). 90 tablet 11    DULoxetine (CYMBALTA) 60 MG capsule Take 1 capsule by mouth 2 times daily 180 capsule 3    ergocalciferol (ERGOCALCIFEROL) 50,000 unit Cap Take 1 capsule (50,000 Units total) by mouth every 7 days. 4 capsule 6    fenofibrate (TRICOR) 145 MG tablet Take 1 tablet by mouth in the morning. 30 tablet 5    fluticasone propionate (FLONASE) 50 mcg/actuation nasal spray Take 1 spray by Each Nostril route in the morning. 16 g 3    folic acid (FOLVITE) 1 MG tablet Take 1 tablet by mouth every day in the morning. 30 tablet 5    furosemide (LASIX) 20 MG tablet Take 1 tablet (20 mg total) by mouth once daily. 90 tablet 3    glucagon (BAQSIMI) 3 mg/actuation [place] SPRAY 1 SPRAY IN NOSTRIL AS NEEDED FOR EMERGENCY. MAY REPEAT 1 TIME AFTER 15 " "MINUTES 2 each 1    insulin aspart U-100 (NOVOLOG FLEXPEN U-100 INSULIN) 100 unit/mL (3 mL) InPn pen Inject 9 Units into the skin 3 (three) times daily before meals. Use sliding scale for small medium and large meals 15 mL 3    insulin aspart U-100 (NOVOLOG U-100 INSULIN ASPART) 100 unit/mL injection To use via insulin pump. Up to 200 units every 2 days 30 mL 5    insulin glargine U-100, Lantus, (LANTUS SOLOSTAR U-100 INSULIN) 100 unit/mL (3 mL) InPn pen Inject 16 Units into the skin once daily. Use in the event of pump failure 15 mL 3    insulin pump cart,auto,BT,G6/7 (OMNIPOD 5 G6-G7 PODS, GEN 5,) Crtg 1 each by Misc.(Non-Drug; Combo Route) route every 48 hours. 45 each 3    insulin syringe-needle U-100 0.3 mL 30 gauge x 5/16" Syrg 1 each by Misc.(Non-Drug; Combo Route) route Every 3 (three) days. 100 each 2    lamoTRIgine (LAMICTAL) 100 MG tablet Take 1 tablet (100 mg total) by mouth every morning. 30 tablet 2    levETIRAcetam (KEPPRA) 500 MG Tab Take 1.5 tablets (750 mg total) by mouth 2 (two) times daily. 90 tablet 0    levocetirizine (XYZAL) 5 MG tablet Take 1 tablet (5 mg total) by mouth every evening. 30 tablet 11    LIDOcaine (LIDODERM) 5 % Place 2 patches onto the skin every 12 (twelve) hours as needed (pain). Remove & Discard patch within 12 hours or as directed by MD 60 patch 11    lifitegrast (XIIDRA) 5 % Dpet Place 1 drop into both eyes 2 (two) times daily. 60 each 12    magnesium oxide (MAG-OX) 400 mg (241.3 mg magnesium) tablet Take 1 tablet (400 mg total) by mouth once daily. 90 tablet 3    melatonin (MELATIN) 3 mg tablet Take 2 tablets (6 mg total) by mouth nightly. 180 tablet 0    metoprolol succinate (TOPROL-XL) 50 MG 24 hr tablet Take 1 tablet (50 mg total) by mouth every morning. 30 tablet 11    omeprazole (PRILOSEC) 40 MG capsule Take 1 capsule (40 mg total) by mouth once daily. 90 capsule 1    ondansetron (ZOFRAN) 4 MG tablet Take 4 mg by mouth every 6 (six) hours.      " "OZEMPIC 2 mg/dose (8 mg/3 mL) PnIj Inject 2 mg into the skin every 7 days.      pen needle, diabetic (BD ULTRA-FINE MINI PEN NEEDLE) 31 gauge x 3/16" Ndle Use 1 a day in event of pump failure 100 each 11    polyethylene glycol (GLYCOLAX) 17 gram/dose powder Take 17 g by mouth 2 (two) times daily. 1020 g 11    secukinumab (COSENTYX PEN, 2 PENS,) 150 mg/mL PnIj Inject 300 mg into the skin every 14 (fourteen) days. 4 mL 11    SYMBICORT 160-4.5 mcg/actuation HFAA Inhale 2 puffs into the lungs in the morning and 2 puffs before bedtime. 2 puffs every 12 hours 10.2 g 2    topiramate (TOPAMAX) 25 MG tablet Take 1 tablet (25 mg total) by mouth every evening. 30 tablet 2    triamcinolone acetonide 0.1% (KENALOG) 0.1 % paste Apply coating 2 times daily 5 g 12    vitamin D3-vitamin K2 1,250-200 mcg Cap Take by mouth.      zolpidem (AMBIEN CR) 6.25 MG CR tablet Take 1 tablet (6.25 mg total) by mouth nightly as needed for Insomnia. 30 tablet 2    [] benzonatate (TESSALON) 100 MG capsule Take 2 capsules (200 mg total) by mouth 3 (three) times daily as needed. 20 capsule 0    estradioL (ESTRACE) 0.01 % (0.1 mg/gram) vaginal cream Place 1 g vaginally twice a week. 42.5 g 3    nystatin (MYCOSTATIN) 100,000 unit/mL suspension Take 4 mLs (400,000 Units total) by mouth 4 (four) times daily. for 10 days 160 mL 0    [DISCONTINUED] clonazePAM (KLONOPIN) 1 MG tablet Take 1 tablet by mouth daily 30 tablet 0    [DISCONTINUED] glucagon, human recombinant, (GLUCAGON EMERGENCY KIT, HUMAN,) 1 mg SolR Inject 1 mg into the muscle as needed. Emergency use 1 each 1    [DISCONTINUED] valsartan (DIOVAN) 320 MG tablet Take 1 tablet (320 mg total) by mouth once daily. 90 tablet 3     No facility-administered encounter medications on file as of 2025.   "

## 2025-04-01 NOTE — TELEPHONE ENCOUNTER
Spoke with pt and ask if she would like to schedule an appt and she agreed. Appt was scheduled       ----- Message from Halley sent at 4/1/2025 10:11 AM CDT -----  Contact: Yolanda  Type:  Patient Requesting a call back Who Called:Ade is the call back request regarding?:pt would like to speak Nurse, she states her iron level has dropped to 8.8Would the patient rather a call back or a response via [facility] Call Back Number:[phone] Information:Please call fro additional information

## 2025-04-01 NOTE — ASSESSMENT & PLAN NOTE
- [place] Sleepiness Scale TOTAL =   (validated sleepiness questionnaire with a higher score indicating greater sleepiness; range 0-24)  - APAP Settings: 5-20 cm H2O   Mask     HME: [facility]     - Using and benefits per patient  -Compliant data  - Optimal control of ALEN with AHI=5.8  - Discussed barriers to usage         - Therapeutic goals for positive airway pressure therapy(CPAP or BiPAP):  Ideal is usage 100% of nights for at least 6 hours per night  Minimum usage is 70% of night for at least 4 hours per night used    Plan:    Follow up   device download.

## 2025-04-01 NOTE — PROGRESS NOTES
"Subjective:      Patient ID: Yolanda Betts is a 54 y.o. female.    Chief Complaint: Anal Itching, abnormal labs, and urine ("States neon yellow")    The patient location is: [place]  The chief complaint leading to consultation is: See above    Visit type: audiovisual    Face to Face time with patient: 15 minutes  21 minutes of total time spent on the encounter, which includes face to face time and non-face to face time preparing to see the patient (eg, review of tests), Obtaining and/or reviewing separately obtained history, Documenting clinical information in the electronic or other health record, Independently interpreting results (not separately reported) and communicating results to the patient/family/caregiver, or Care coordination (not separately reported).         Each patient to whom he or she provides medical services by telemedicine is:  (1) informed of the relationship between the physician and patient and the respective role of any other health care provider with respect to management of the patient; and (2) notified that he or she may decline to receive medical services by telemedicine and may withdraw from such care at any time.    Notes:     HPI  The patient has a history of chronic constipation but she is new to me. She presents to clinic today for anal itching. This started 4-5 days ago. She thinks it is worse at night. She's looked it up and asks if it could be pinworms but has no know exposure. She denies rectal bleeding or rectal bumps. She has a history of hemorrhoids and has been straining lately. She has a BM every 3-4 days. She is taking Miralax bid.   She is on Ozempic which can contribute to constipation.     Colonoscopy (04/2024): decreased sphincter tone. One polyp removed. Diverticulosis and hemorrhoids. Repeat 5 years.     Review of Systems  As per HPI.     Objective:     Physical Exam  Constitutional:       General: She is not in acute distress.     Appearance: She is well-developed. "   HENT:      Head: Normocephalic and atraumatic.   Pulmonary:      Effort: Pulmonary effort is normal.   Neurological:      Mental Status: She is alert and oriented to person, place, and time.      Cranial Nerves: No cranial nerve deficit.   Psychiatric:         Behavior: Behavior normal.         Assessment:     1. Anal itching    2. Hemorrhoids, unspecified hemorrhoid type    3. Chronic idiopathic constipation    4. Iron deficiency anemia secondary to inadequate dietary iron intake        Plan:     This is a virtual visit and unable to do a rectal exam. I assume the itching is related to hemorrhoids given her history and recent straining. I will send hydrocortisone cream.   She has tried many medications for her constipation in the past. She feels Linzess worked the best. She says Amitiza and Movantik didn't work at all. I will prescribe Linzess and have her continue bid Miralax.   The patient is noted to have chronic microcytic anemia over several  years. She had a Colonoscopy about a year ago and EGD two years ago. I will order a VCE to complete her GI workup. Will also refer to Hematology for additional work up and consideration of iron replacement.     Orders Placed This Encounter   Procedures    Ambulatory referral/consult to Hematology / Oncology     Medications Ordered This Encounter   Medications    hydrocortisone (ANUSOL-HC) 2.5 % rectal cream     Sig: Place rectally once daily. Needs internal applicator. for 10 days     Dispense:  30 g     Refill:  2    linaCLOtide (LINZESS) 290 mcg Cap capsule     Sig: Take 1 capsule (290 mcg total) by mouth before breakfast.     Dispense:  30 capsule     Refill:  3       Follow up if symptoms worsen or fail to improve.    Thank you for the opportunity to participate in the care of this patient.   Ronny Melgoza PA-C.

## 2025-04-01 NOTE — TELEPHONE ENCOUNTER
----- Message from Nurse Constantino sent at 4/1/2025  3:35 PM CDT -----  Regarding: FW: Appointment    ----- Message -----  From: Toño Jose LPN  Sent: 4/1/2025   3:11 PM CDT  To: [facility] On[facility] Clinical Support  Subject: Appointment                                      HiRonny referred this patient to Hem/Onc but I am unable to schedule it due to her insurance. Can you please get her scheduled? If you would like for me to call her and give her the appointment date/time, please let me know.Thanks so much!Toño

## 2025-04-02 ENCOUNTER — TELEPHONE (OUTPATIENT)
Dept: PSYCHIATRY | Facility: CLINIC | Age: 55
End: 2025-04-02
Payer: MEDICAID

## 2025-04-02 DIAGNOSIS — G40.409 MYOCLONIC SEIZURE: ICD-10-CM

## 2025-04-02 DIAGNOSIS — F25.0 SCHIZOAFFECTIVE DISORDER, BIPOLAR TYPE: Chronic | ICD-10-CM

## 2025-04-02 RX ORDER — LAMOTRIGINE 100 MG/1
100 TABLET ORAL 2 TIMES DAILY
Qty: 60 TABLET | Refills: 2 | Status: SHIPPED | OUTPATIENT
Start: 2025-04-02 | End: 2025-07-01

## 2025-04-02 NOTE — TELEPHONE ENCOUNTER
"Called Yolanda to discuss her anxiety--reported that she is "not able to get it together when it comes to getting things to cleaning the house"--staying in the bed    Lost uncle in [place] last Sunday--contributing    Thinks that she took buspirone in past but could not recall anything about it except it didn't work (?)--    Considered adding buspirone but so many other drugs    On Cymbalta 60 mg bid    Having jaw problems and ulcers and thinks related to stopping cosyntex    Discussed increasing Lamictal 100 mg bid  "

## 2025-04-03 ENCOUNTER — PATIENT MESSAGE (OUTPATIENT)
Dept: ENDOSCOPY | Facility: HOSPITAL | Age: 55
End: 2025-04-03
Payer: MEDICAID

## 2025-04-03 ENCOUNTER — OFFICE VISIT (OUTPATIENT)
Dept: PRIMARY CARE CLINIC | Facility: CLINIC | Age: 55
End: 2025-04-03
Payer: MEDICAID

## 2025-04-03 ENCOUNTER — TELEPHONE (OUTPATIENT)
Dept: ENDOSCOPY | Facility: HOSPITAL | Age: 55
End: 2025-04-03
Payer: MEDICAID

## 2025-04-03 DIAGNOSIS — K12.0 CANKER SORES ORAL: Primary | ICD-10-CM

## 2025-04-03 DIAGNOSIS — J32.9 RHINOSINUSITIS: ICD-10-CM

## 2025-04-03 RX ORDER — FLUCONAZOLE 150 MG/1
150 TABLET ORAL DAILY
Qty: 5 TABLET | Refills: 0 | Status: SHIPPED | OUTPATIENT
Start: 2025-04-03 | End: 2025-04-09

## 2025-04-03 RX ORDER — LEVETIRACETAM 500 MG/1
750 TABLET ORAL 2 TIMES DAILY
Qty: 90 TABLET | Refills: 0 | Status: SHIPPED | OUTPATIENT
Start: 2025-04-03 | End: 2026-04-03

## 2025-04-03 NOTE — TELEPHONE ENCOUNTER
Spoke to Ms. Betts regarding scheduling [facility] appointment. Ms. Betts complained of severe constipation and abdominal distention. Stated she was going to the ED. I sent a [employer] message with my number to call to schedule when ready.

## 2025-04-03 NOTE — PROGRESS NOTES
Subjective:       Patient ID: Yolanda Betts is a 54 y.o. female.      The patient location is: [place], [place]    Visit type: audiovisual-Synchronous      Face to Face time with patient: 11 min  20 minutes of total time spent on the encounter, which includes face to face time and non-face to face time preparing to see the patient (eg, review of tests), Obtaining and/or reviewing separately obtained history, Documenting clinical information in the electronic or other health record, Independently interpreting results (not separately reported) and communicating results to the patient/family/caregiver, or Care coordination (not separately reported).         Each patient to whom he or she provides medical services by telemedicine is:  (1) informed of the relationship between the physician and patient and the respective role of any other health care provider with respect to management of the patient; and (2) notified that he or she may decline to receive medical services by telemedicine and may withdraw from such care at any time.       History of Present Illness:   Yolanda Betts 54 y.o. female presents today with the following   History of Present Illness    CHIEF COMPLAINT:  Ms. Betts presents today for oral sores    ORAL SYMPTOMS:  She reports oral sores occurring monthly following ankylosing spondylitis medication injection. Fluconazole helps improve symptoms and oral lesions.    ENT:  She reports nasal congestion and uses Flonase as directed.    GASTROINTESTINAL:  She has concerns about potential medication-induced constipation. She currently manages constipation with Linzess and Miralax as needed.    CURRENT MEDICATIONS:  She takes Zazole 5mg, Flonase, Linzess, and Miralax as needed.        Past Medical History:   Diagnosis Date    Abnormal Pap smear of cervix     HPV genital warts    Anemia     Anxiety     Arthritis     Asthma 10/11/2016    Bipolar 1 disorder     Diabetes mellitus, type 2      "Dyslipidemia associated with type 2 diabetes mellitus 05/13/2019    Dyspnea due to COVID-19 09/12/2024    General anesthetics causing adverse effect in therapeutic use     Genital warts     GERD (gastroesophageal reflux disease)     Herpes simplex virus (HSV) infection     Hyperlipidemia     Hypertension     Hypertension complicating diabetes 05/05/2019    Migraine with aura and without status migrainosus, not intractable 03/21/2016    Mild persistent asthma without complication 10/11/2016    Morbid obesity with body mass index (BMI) of 45.0 to 49.9 in adult 08/03/2017    Myoclonus     Obstructive sleep apnea     ALEN (obstructive sleep apnea)     2L per N/C q HS    Schizoaffective disorder, bipolar type 04/25/2019    Seasonal allergic rhinitis due to pollen 05/13/2019    Seizures     Type 2 diabetes mellitus with hyperglycemia, with long-term current use of insulin 12/21/2017    Type 2 diabetes mellitus with hyperglycemia, without long-term current use of insulin 12/21/2017     Family History   Problem Relation Name Age of Onset    Diabetes Mother      Hyperlipidemia Mother      Hypertension Mother      Asthma Mother      COPD Mother      Glaucoma Mother      Thyroid disease Mother      Anesthesia problems Mother          "almost had a cardiac arrest" , blood clots    Hypertension Father      Hyperlipidemia Father      Cancer Father          Brain, lung, liver, kidney    No Known Problems Sister      Hypertension Brother      Alcohol abuse Brother      Heart disease Maternal Grandmother      Hyperlipidemia Maternal Grandmother      Hypertension Maternal Grandmother      Cataracts Maternal Grandmother      Diabetes Maternal Grandmother      Heart disease Maternal Grandfather      Hyperlipidemia Maternal Grandfather      Hypertension Maternal Grandfather      Glaucoma Maternal Grandfather      Cancer Maternal Grandfather      Cataracts Maternal Grandfather      Macular degeneration Maternal Grandfather      Diabetes " Maternal Grandfather      Heart disease Paternal Grandmother      Hyperlipidemia Paternal Grandmother      Hypertension Paternal Grandmother      Cataracts Paternal Grandmother      Heart disease Paternal Grandfather      Hyperlipidemia Paternal Grandfather      Hypertension Paternal Grandfather      Cataracts Paternal Grandfather      Breast cancer Maternal Cousin      Breast cancer Maternal Cousin      Breast cancer Maternal Cousin      Breast cancer Maternal Cousin       Social History[1]  Encounter Medications[2]    Review of Systems   Constitutional:  Negative for chills.   HENT:  Positive for congestion, mouth sores and sore throat. Negative for postnasal drip and rhinorrhea.    Respiratory:  Positive for wheezing.    Musculoskeletal:  Negative for myalgias.   Skin:  Negative for rash.   Allergic/Immunologic: Negative for environmental allergies.   Neurological:  Positive for headaches.       Objective:   Physical Exam  Constitutional:       Appearance: She is obese.   Neurological:      Mental Status: She is alert.           There were no vitals taken for this visit.  Physical Exam               Results for orders placed or performed in visit on 04/01/25   Spirometry with/without bronchodilator    Collection Time: 04/01/25  9:31 AM   Result Value Ref Range    Interpretation       Spirometry shows a low QOH99-62 suggesting obstruction may be present. Baseline FVC is low however the post bronchodilator value is normal. Spirometry remains unimproved following bronchodilator.   Â   Notes:  Â   The failure to demonstrate improvement in spirometry does not preclude a clinical response to a trial of bronchodilators.       Post FVC 1.75 1.68 - 2.79 L    Post FEV1 1.45 1.36 - 2.25 L    Post FEV1 FVC 82.78 70.48 - 91.11 %    Post FEF 25 75 1.71 1.56 - 4.36 L/s    Post PEF 2.98 (L) 3.42 - 6.60 L/s    Post  6.74 sec    Pre FVC 1.68 (L) 1.68 - 2.79 L    Pre FEV1 1.39 1.36 - 2.25 L    Pre FEV1 FVC 82.50 70.48 -  91.11 %    Pre FEF 25 75 1.54 (L) 1.56 - 4.36 L/s    Pre PEF 3.93 3.42 - 6.60 L/s    Pre  6.61 sec    FVC Ref 2.22     FVC LLN 1.68     FVC Pre Ref 75.7 %    FVC Post Ref 78.6 %    FVC Chg 3.8 %    FEV1 Ref 1.82     FEV1 LLN 1.36     FEV1 Pre Ref 76.5 %    FEV1 Post Ref 79.7 %    FEV1 Chg 4.2 %    FEV1 FVC Ref 82     FEV1 FVC LLN 70     FEV1 FVC Pre Ref 101.1 %    FEV1 FVC Post Ref 101.4 %    FEV1 FVC Chg 0.3 %    FEF 25 75 Ref 2.96     FEF 25 75 LLN 1.56     FEF 25 75 Pre Ref 52.1 %    FEF 25 75 Post Ref 57.6 %    FEF 25 75 Chg 10.5 %    PEF Ref 5.01     PEF LLN 3.42     PEF Pre Ref 78.5 %    PEF Post Ref 59.6 %    PEF Chg -24.1 %    [id number] Chg 2.1 %     *Note: Due to a large number of results and/or encounters for the requested time period, some results have not been displayed. A complete set of results can be found in Results Review.     Assessment:       1. Canker sores oral    2. Rhinosinusitis    Assessment & Plan    M45.9 Ankylosing spondylitis of unspecified sites in spine  D64.89 Other specified anemias  K12.0 Recurrent oral aphthae  K59.03 Drug induced constipation  R09.81 Nasal congestion  T88.7XXA Unspecified adverse effect of drug or medicament, initial encounter    ANKYLOSING SPONDYLITIS:  - Noted that oral sores appear monthly after ankylosing spondylitis medication injection.  - Continued Zazole 5 mg as directed for ankylosing spondylitis treatment.  - Instructed the patient to continue self-administering medication injections for ankylosing spondylitis.  - Noted that the patient reports oral sores appearing monthly after injecting medication for ankylosing spondylitis.    ANEMIA:  - Observed anemia with decreased hemoglobin and hematocrit levels since the last lab draw.  - Prescribed ferrous sulfate 325 mg orally twice daily for iron supplementation.  - Recommend starting a daily multivitamin.  - Encouraged the patient to adhere to the prescribed iron supplementation regimen.    RECURRENT  ORAL SORES:  - Prescribed fluconazole to alleviate symptoms and improve oral lesions.  - Confirmed that fluconazole assists with symptoms of oral sores.  - Prescribed fluconazole to assist with symptoms and improve lesions in the mouth.    CONSTIPATION:  - Addressed the patient's concerns about potential constipation from new medication.  - Continued the current regimen for constipation management, including Linzess and Miralax as needed.    NASAL CONGESTION:  - Addressed nasal congestion with a combination of nasal irrigation and Flonase.  - Instructed the patient to perform regular nasal irrigations.  - Continued Flonase as previously directed.    MEDICATION SIDE EFFECTS:  - Monitored for any additional adverse effects from the medication.        Plan:   Canker sores oral  -     fluconazole (DIFLUCAN) 150 MG Tab; Take 1 tablet (150 mg total) by mouth once daily. for 5 days  Dispense: 5 tablet; Refill: 0    Rhinosinusitis      Today's encounter took a total time of 20 minutes, and that time included Preparing to see the patient (review records, tests), Obtaining and/or reviewing separately obtained historical data, Performing a medically appropriate examination and/or evaluation , Counseling & educating the patient/family/caregiver on treatment plan with chronic health conditions , ordering medications, tests, and/or procedures, Referring and communicating with other healthcare professionals , Documenting clinical information in the electronic or other health record, Independently interpreting results & communicating results to the patient/family/caregiver.           [facility] [address] [place] [id number]  Office [phone]  Fax [phone]   This note was generated with the assistance of ambient listening technology. Verbal consent was obtained by the patient and accompanying visitor(s) for the recording of patient appointment to facilitate this note. I  attest to having reviewed and edited the generated note for accuracy, though some syntax or spelling errors may persist. Please contact the author of this note for any clarification.            [1]   Social History  Socioeconomic History    Marital status: Single   Tobacco Use    Smoking status: Never     Passive exposure: Never    Smokeless tobacco: Never   Substance and Sexual Activity    Alcohol use: Not Currently     Comment: socially  No alcohol 72h prior to sx    Drug use: No    Sexual activity: Yes     Partners: Male     Birth control/protection: Surgical     Comment: hyst   Social History Narrative    Long-term care nurse     Social Drivers of Health     Financial Resource Strain: High Risk (2/20/2025)    Overall Financial Resource Strain (CARDIA)     Difficulty of Paying Living Expenses: Very hard   Food Insecurity: Food Insecurity Present (2/20/2025)    Hunger Vital Sign     Worried About Running Out of Food in the Last Year: Often true     Ran Out of Food in the Last Year: Often true   Transportation Needs: Unmet Transportation Needs (3/19/2025)    PRAPARE - Transportation     Lack of Transportation (Medical): Yes     Lack of Transportation (Non-Medical): Yes   Physical Activity: Insufficiently Active (2/20/2025)    Exercise Vital Sign     Days of Exercise per Week: 2 days     Minutes of Exercise per Session: 20 min   Stress: Stress Concern Present (2/20/2025)    [employer] - Occupational Stress Questionnaire     Feeling of Stress : Very much   Housing Stability: High Risk (2/20/2025)    Housing Stability Vital Sign     Unable to Pay for Housing in the Last Year: Yes     Number of Times Moved in the Last Year: 0     Homeless in the Last Year: No   [2]   Outpatient Encounter Medications as of 4/3/2025   Medication Sig Dispense Refill    acetaminophen (TYLENOL) 325 MG tablet Take 650 mg by mouth every 6 (six) hours as needed.      albuterol (PROVENTIL) 2.5 mg /3 mL (0.083 %)  "nebulizer solution Take 2.5 mg by nebulization every 4 (four) hours as needed. Times a day 75 mL 1    albuterol (PROVENTIL/VENTOLIN HFA) 90 mcg/actuation inhaler Inhale 2 puffs into the lungs every 6 (six) hours as needed for Wheezing. 8.5 g 1    ALPRAZolam (XANAX) 1 MG tablet Take 1 tablet (1 mg total) by mouth 2 (two) times daily as needed for Anxiety. 60 tablet 2    amLODIPine (NORVASC) 10 MG tablet Take 1 tablet (10 mg total) by mouth once daily. 30 tablet 11    atorvastatin (LIPITOR) 20 MG tablet Take 1 tablet (20 mg total) by mouth every evening 90 tablet 3    BD INSULIN SYRINGE ULTRA-FINE 1/2 mL 30 gauge x 1/2" Syrg   0    blood-glucose sensor (DEXCOM G6 SENSOR) Mariela change sensor every 10 days. 3 each 11    blood-glucose transmitter (DEXCOM G6 TRANSMITTER) Mariela 1 each by Misc.(Non-Drug; Combo Route) route every 3 (three) months. 1 each 3    cholecalciferol, vitamin D3, 1,250 mcg (50,000 unit) Tab Take 1 capsule by mouth every 7 days. 12 tablet 3    cyclobenzaprine (FLEXERIL) 10 MG tablet Take 1 tablet (10 mg total) by mouth 3 (three) times daily as needed (Pain). 90 tablet 11    DULoxetine (CYMBALTA) 60 MG capsule Take 1 capsule by mouth 2 times daily 180 capsule 3    ergocalciferol (ERGOCALCIFEROL) 50,000 unit Cap Take 1 capsule (50,000 Units total) by mouth every 7 days. 4 capsule 6    estradioL (ESTRACE) 0.01 % (0.1 mg/gram) vaginal cream Place 1 g vaginally twice a week. 42.5 g 3    fenofibrate (TRICOR) 145 MG tablet Take 1 tablet by mouth in the morning. 30 tablet 5    fluconazole (DIFLUCAN) 150 MG Tab Take 1 tablet (150 mg total) by mouth once daily. for 5 days 5 tablet 0    fluticasone propionate (FLONASE) 50 mcg/actuation nasal spray Take 1 spray by Each Nostril route in the morning. 16 g 3    folic acid (FOLVITE) 1 MG tablet Take 1 tablet by mouth every day in the morning. 30 tablet 5    furosemide (LASIX) 20 MG tablet Take 1 tablet (20 mg total) by mouth once daily. 90 tablet 3    glucagon " "(BAQSIMI) 3 mg/actuation [place] SPRAY 1 SPRAY IN NOSTRIL AS NEEDED FOR EMERGENCY. MAY REPEAT 1 TIME AFTER 15 MINUTES 2 each 1    hydrocortisone (ANUSOL-HC) 2.5 % rectal cream Place rectally once daily. Needs internal applicator. for 10 days 30 g 2    insulin aspart U-100 (NOVOLOG FLEXPEN U-100 INSULIN) 100 unit/mL (3 mL) InPn pen Inject 9 Units into the skin 3 (three) times daily before meals. Use sliding scale for small medium and large meals 15 mL 3    insulin aspart U-100 (NOVOLOG U-100 INSULIN ASPART) 100 unit/mL injection To use via insulin pump. Up to 200 units every 2 days 30 mL 5    insulin glargine U-100, Lantus, (LANTUS SOLOSTAR U-100 INSULIN) 100 unit/mL (3 mL) InPn pen Inject 16 Units into the skin once daily. Use in the event of pump failure 15 mL 3    insulin pump cart,auto,BT,G6/7 (OMNIPOD 5 G6-G7 PODS, GEN 5,) Crtg 1 each by Misc.(Non-Drug; Combo Route) route every 48 hours. 45 each 3    insulin syringe-needle U-100 0.3 mL 30 gauge x 5/16" Syrg 1 each by Misc.(Non-Drug; Combo Route) route Every 3 (three) days. 100 each 2    lamoTRIgine (LAMICTAL) 100 MG tablet Take 1 tablet (100 mg total) by mouth 2 (two) times daily. 60 tablet 2    levocetirizine (XYZAL) 5 MG tablet Take 1 tablet (5 mg total) by mouth every evening. 30 tablet 11    LIDOcaine (LIDODERM) 5 % Place 2 patches onto the skin every 12 (twelve) hours as needed (pain). Remove & Discard patch within 12 hours or as directed by MD 60 patch 11    lifitegrast (XIIDRA) 5 % Dpet Place 1 drop into both eyes 2 (two) times daily. 60 each 12    linaCLOtide (LINZESS) 290 mcg Cap capsule Take 1 capsule (290 mcg total) by mouth before breakfast. 30 capsule 3    magnesium oxide (MAG-OX) 400 mg (241.3 mg magnesium) tablet Take 1 tablet (400 mg total) by mouth once daily. 90 tablet 3    melatonin (MELATIN) 3 mg tablet Take 2 tablets (6 mg total) by mouth nightly. 180 tablet 0    metoprolol succinate (TOPROL-XL) 50 MG 24 hr tablet Take 1 tablet (50 mg total) by " "mouth every morning. 30 tablet 11    nystatin (MYCOSTATIN) 100,000 unit/mL suspension Take 4 mLs (400,000 Units total) by mouth 4 (four) times daily. for 10 days 160 mL 0    omeprazole (PRILOSEC) 40 MG capsule Take 1 capsule (40 mg total) by mouth once daily. 90 capsule 1    ondansetron (ZOFRAN) 4 MG tablet Take 4 mg by mouth every 6 (six) hours.      OZEMPIC 2 mg/dose (8 mg/3 mL) PnIj Inject 2 mg into the skin every 7 days.      pen needle, diabetic (BD ULTRA-FINE MINI PEN NEEDLE) 31 gauge x 3/16" Ndle Use 1 a day in event of pump failure 100 each 11    polyethylene glycol (GLYCOLAX) 17 gram/dose powder Take 17 g by mouth 2 (two) times daily. 1020 g 11    secukinumab (COSENTYX PEN, 2 PENS,) 150 mg/mL PnIj Inject 300 mg into the skin every 14 (fourteen) days. 4 mL 11    SYMBICORT 160-4.5 mcg/actuation HFAA Inhale 2 puffs into the lungs in the morning and 2 puffs before bedtime. 2 puffs every 12 hours 10.2 g 2    topiramate (TOPAMAX) 25 MG tablet Take 1 tablet (25 mg total) by mouth every evening. 30 tablet 2    triamcinolone acetonide 0.1% (KENALOG) 0.1 % paste Apply coating 2 times daily 5 g 12    vitamin D3-vitamin K2 1,250-200 mcg Cap Take by mouth.      zolpidem (AMBIEN CR) 6.25 MG CR tablet Take 1 tablet (6.25 mg total) by mouth nightly as needed for Insomnia. 30 tablet 2    [DISCONTINUED] clonazePAM (KLONOPIN) 1 MG tablet Take 1 tablet by mouth daily 30 tablet 0    [DISCONTINUED] glucagon, human recombinant, (GLUCAGON EMERGENCY KIT, HUMAN,) 1 mg SolR Inject 1 mg into the muscle as needed. Emergency use 1 each 1    [DISCONTINUED] lamoTRIgine (LAMICTAL) 100 MG tablet Take 1 tablet (100 mg total) by mouth every morning. 30 tablet 2    [DISCONTINUED] levETIRAcetam (KEPPRA) 500 MG Tab Take 1.5 tablets (750 mg total) by mouth 2 (two) times daily. 90 tablet 0    [DISCONTINUED] valsartan (DIOVAN) 320 MG tablet Take 1 tablet (320 mg total) by mouth once daily. 90 tablet 3     No facility-administered encounter " medications on file as of 4/3/2025.

## 2025-04-07 ENCOUNTER — TELEPHONE (OUTPATIENT)
Dept: OBSTETRICS AND GYNECOLOGY | Facility: CLINIC | Age: 55
End: 2025-04-07
Payer: MEDICAID

## 2025-04-07 NOTE — TELEPHONE ENCOUNTER
Pt called to reschedule appt,due to not feeling well..  appt rescheduled with transportation ordered     Charley LEHMAN LPN  OB/GYN

## 2025-04-07 NOTE — TELEPHONE ENCOUNTER
----- Message from SamanthaVolance sent at 4/7/2025 11:53 AM CDT -----  Contact: self  Type:  Apoointment RequestCaller is requesting a sooner appointment.  Caller declined first available appointment listed below.  Caller will not accept being placed on the waitlist and is requesting a message be sent to doctor.Name of Caller:Yolanda Martin luciaYolande is the first available appointment?5/23/25Symptoms:discharge, other Would the patient rather a call back or a response via [facility]? [facility] Call Back Number:[phone] Information: the patient was call due to her not feeling well she wanted to reschedule the appointment today but she doesn't want to wait until 5/23/25

## 2025-04-08 ENCOUNTER — TELEPHONE (OUTPATIENT)
Dept: PRIMARY CARE CLINIC | Facility: CLINIC | Age: 55
End: 2025-04-08
Payer: MEDICAID

## 2025-04-08 ENCOUNTER — TELEPHONE (OUTPATIENT)
Dept: NEUROLOGY | Facility: CLINIC | Age: 55
End: 2025-04-08
Payer: MEDICAID

## 2025-04-08 DIAGNOSIS — R56.9 SEIZURE-LIKE ACTIVITY: Primary | ICD-10-CM

## 2025-04-08 NOTE — TELEPHONE ENCOUNTER
Called pt to let them know that they need to contact Pippa directly at [phone] in order to reschedule their EMU appt for 4/9.

## 2025-04-08 NOTE — TELEPHONE ENCOUNTER
Gave pt call back pt stated she wishes to reschedule her 3/9 EEG EMU due to her being feverish/sick. I told pt I did not have access to reschedule that but I would talk to the team in charge of that

## 2025-04-08 NOTE — TELEPHONE ENCOUNTER
Called pt to let them know that they need to call the EMU coordinatorPippa at [phone] if they want to reschedule their EMU admission appt.  
urinary catheter complications

## 2025-04-08 NOTE — TELEPHONE ENCOUNTER
I have attempted without success to contact this patient by phone to return their call.     ----- Message from Usha sent at 4/8/2025  8:33 AM CDT -----  Name of Caller:Penny Betts When is the first available appointment?Symptoms: constipated, weak, dizzy and nauseousBest Call Back Number:.[phone]  Additional Information: Patient is requesting a call back regarding the symptoms she is experiencing. Yuriy EL

## 2025-04-08 NOTE — TELEPHONE ENCOUNTER
Returned call to patient she states she is fatigue and constipated, nauseated, and is requesting an appt. Scheduled pt appt 4/11/25 at 9:40 am she voiced understanding.        ----- Message from Reba sent at 4/8/2025  3:25 PM CDT -----  Contact: Patient, [phone]  Patient is returning a phone call.Who left a message for the patient: Khari patient know what this is regarding:  AppointmentWould you like a call back, or a response through your [facility] portal?:   Call backComments: Missed your call, please call her back. Thanks.

## 2025-04-08 NOTE — TELEPHONE ENCOUNTER
Patient is sick/ fever and has rescheduled EMU 5/13/25, 11am. Confirms her  can make this date as well. I have alerted pre service of this change in admit date

## 2025-04-10 ENCOUNTER — TELEPHONE (OUTPATIENT)
Dept: PRIMARY CARE CLINIC | Facility: CLINIC | Age: 55
End: 2025-04-10
Payer: MEDICAID

## 2025-04-10 NOTE — TELEPHONE ENCOUNTER
Spoke with pt and informed to her that there's not available appt today and advised to seek urgent care or ER if symptoms worsens. Pt voiced understanding.       ----- Message from Nehal sent at 4/10/2025 10:08 AM CDT -----  Contact: Yolanda  Type:  Same Day Appointment RequestCaller is requesting a same day appointment.  Caller declined first available appointment listed below.  Name of Caller: Citlali is the first available appointment? NoneSymptoms: Severe constipationBest Call Back Number:[phone] Information: Please call if she can be seen soon.

## 2025-04-11 ENCOUNTER — OFFICE VISIT (OUTPATIENT)
Dept: PRIMARY CARE CLINIC | Facility: CLINIC | Age: 55
End: 2025-04-11
Payer: MEDICAID

## 2025-04-11 ENCOUNTER — OFFICE VISIT (OUTPATIENT)
Dept: PSYCHIATRY | Facility: CLINIC | Age: 55
End: 2025-04-11
Payer: MEDICAID

## 2025-04-11 ENCOUNTER — HOSPITAL ENCOUNTER (OUTPATIENT)
Dept: RADIOLOGY | Facility: HOSPITAL | Age: 55
Discharge: HOME | End: 2025-04-11
Attending: NURSE PRACTITIONER
Payer: MEDICAID

## 2025-04-11 ENCOUNTER — HOSPITAL ENCOUNTER (EMERGENCY)
Facility: HOSPITAL | Age: 55
Discharge: HOME | End: 2025-04-11
Attending: EMERGENCY MEDICINE
Payer: MEDICAID

## 2025-04-11 VITALS
OXYGEN SATURATION: 96 % | SYSTOLIC BLOOD PRESSURE: 138 MMHG | WEIGHT: 212.75 LBS | RESPIRATION RATE: 18 BRPM | DIASTOLIC BLOOD PRESSURE: 85 MMHG | TEMPERATURE: 98 F | BODY MASS INDEX: 47.7 KG/M2 | HEART RATE: 82 BPM

## 2025-04-11 VITALS
BODY MASS INDEX: 45.44 KG/M2 | DIASTOLIC BLOOD PRESSURE: 80 MMHG | HEART RATE: 89 BPM | TEMPERATURE: 99 F | OXYGEN SATURATION: 98 % | SYSTOLIC BLOOD PRESSURE: 101 MMHG | WEIGHT: 202 LBS | HEIGHT: 56 IN

## 2025-04-11 DIAGNOSIS — F99 INSOMNIA DUE TO OTHER MENTAL DISORDER: ICD-10-CM

## 2025-04-11 DIAGNOSIS — K59.00 CONSTIPATION, UNSPECIFIED CONSTIPATION TYPE: Primary | ICD-10-CM

## 2025-04-11 DIAGNOSIS — K52.9 ENTERITIS: ICD-10-CM

## 2025-04-11 DIAGNOSIS — R45.89 ANXIETY ABOUT HEALTH: ICD-10-CM

## 2025-04-11 DIAGNOSIS — F25.0 SCHIZOAFFECTIVE DISORDER, BIPOLAR TYPE: Primary | Chronic | ICD-10-CM

## 2025-04-11 DIAGNOSIS — F41.1 GENERALIZED ANXIETY DISORDER: Chronic | ICD-10-CM

## 2025-04-11 DIAGNOSIS — F51.05 INSOMNIA DUE TO OTHER MENTAL DISORDER: ICD-10-CM

## 2025-04-11 DIAGNOSIS — K59.00 CONSTIPATION, UNSPECIFIED CONSTIPATION TYPE: ICD-10-CM

## 2025-04-11 DIAGNOSIS — R07.9 CHEST PAIN: Primary | ICD-10-CM

## 2025-04-11 DIAGNOSIS — E66.01 MORBID OBESITY: ICD-10-CM

## 2025-04-11 DIAGNOSIS — K59.09 CHRONIC CONSTIPATION: ICD-10-CM

## 2025-04-11 LAB
ABSOLUTE EOSINOPHIL (OHS): 0.06 K/UL
ABSOLUTE MONOCYTE (OHS): 0.54 K/UL (ref 0.3–1)
ABSOLUTE NEUTROPHIL COUNT (OHS): 6.41 K/UL (ref 1.8–7.7)
ALBUMIN SERPL BCP-MCNC: 3.7 G/DL (ref 3.5–5.2)
ALP SERPL-CCNC: 91 UNIT/L (ref 40–150)
ALT SERPL W/O P-5'-P-CCNC: 33 UNIT/L (ref 10–44)
ANION GAP (OHS): 8 MMOL/L (ref 8–16)
AST SERPL-CCNC: 28 UNIT/L (ref 11–45)
BACTERIA #/AREA URNS AUTO: ABNORMAL /HPF
BASOPHILS # BLD AUTO: 0.03 K/UL
BASOPHILS NFR BLD AUTO: 0.3 %
BILIRUB SERPL-MCNC: 0.2 MG/DL (ref 0.1–1)
BILIRUB UR QL STRIP.AUTO: NEGATIVE
BUN SERPL-MCNC: 11 MG/DL (ref 6–20)
CALCIUM SERPL-MCNC: 9.9 MG/DL (ref 8.7–10.5)
CHLORIDE SERPL-SCNC: 104 MMOL/L (ref 95–110)
CLARITY UR: ABNORMAL
CO2 SERPL-SCNC: 25 MMOL/L (ref 23–29)
COLOR UR AUTO: YELLOW
CREAT SERPL-MCNC: 1 MG/DL (ref 0.5–1.4)
ERYTHROCYTE [DISTWIDTH] IN BLOOD BY AUTOMATED COUNT: 18.5 % (ref 11.5–14.5)
GFR SERPLBLD CREATININE-BSD FMLA CKD-EPI: >60 ML/MIN/1.73/M2
GLUCOSE SERPL-MCNC: 116 MG/DL (ref 70–110)
GLUCOSE UR QL STRIP: NEGATIVE
HCT VFR BLD AUTO: 34.1 % (ref 37–48.5)
HGB BLD-MCNC: 9.7 GM/DL (ref 12–16)
HGB UR QL STRIP: NEGATIVE
HIV 1+2 AB+HIV1 P24 AG SERPL QL IA: NEGATIVE
HYALINE CASTS UR QL AUTO: 10 /LPF (ref 0–1)
IMM GRANULOCYTES # BLD AUTO: 0.05 K/UL (ref 0–0.04)
IMM GRANULOCYTES NFR BLD AUTO: 0.5 % (ref 0–0.5)
KETONES UR QL STRIP: ABNORMAL
LEUKOCYTE ESTERASE UR QL STRIP: ABNORMAL
LIPASE SERPL-CCNC: 8 U/L (ref 4–60)
LYMPHOCYTES # BLD AUTO: 2.42 K/UL (ref 1–4.8)
MCH RBC QN AUTO: 21.9 PG (ref 27–31)
MCHC RBC AUTO-ENTMCNC: 28.4 G/DL (ref 32–36)
MCV RBC AUTO: 77 FL (ref 82–98)
MICROSCOPIC COMMENT: ABNORMAL
NITRITE UR QL STRIP: NEGATIVE
NUCLEATED RBC (/100WBC) (OHS): 0 /100 WBC
PH UR STRIP: 6 [PH]
PLATELET # BLD AUTO: 369 K/UL (ref 150–450)
PMV BLD AUTO: 9.1 FL (ref 9.2–12.9)
POTASSIUM SERPL-SCNC: 4.1 MMOL/L (ref 3.5–5.1)
PROT SERPL-MCNC: 8 GM/DL (ref 6–8.4)
PROT UR QL STRIP: ABNORMAL
RBC # BLD AUTO: 4.42 M/UL (ref 4–5.4)
RBC #/AREA URNS AUTO: 15 /HPF (ref 0–4)
RELATIVE EOSINOPHIL (OHS): 0.6 %
RELATIVE LYMPHOCYTE (OHS): 25.4 % (ref 18–48)
RELATIVE MONOCYTE (OHS): 5.7 % (ref 4–15)
RELATIVE NEUTROPHIL (OHS): 67.5 % (ref 38–73)
SODIUM SERPL-SCNC: 137 MMOL/L (ref 136–145)
SP GR UR STRIP: >=1.03
SQUAMOUS #/AREA URNS AUTO: 11 /HPF
TROPONIN I SERPL DL<=0.01 NG/ML-MCNC: <0.006 NG/ML
UNSPECIFIED CRY UR QL COMP ASSIST: ABNORMAL
UROBILINOGEN UR STRIP-ACNC: ABNORMAL EU/DL
WBC # BLD AUTO: 9.51 K/UL (ref 3.9–12.7)
WBC #/AREA URNS AUTO: 6 /HPF (ref 0–5)

## 2025-04-11 PROCEDURE — 63600175 PHARM REV CODE 636 W HCPCS: Performed by: EMERGENCY MEDICINE

## 2025-04-11 PROCEDURE — 87389 HIV-1 AG W/HIV-1&-2 AB AG IA: CPT | Performed by: EMERGENCY MEDICINE

## 2025-04-11 PROCEDURE — 99214 OFFICE O/P EST MOD 30 MIN: CPT | Mod: S$PBB,,, | Performed by: NURSE PRACTITIONER

## 2025-04-11 PROCEDURE — 3079F DIAST BP 80-89 MM HG: CPT | Mod: CPTII,,, | Performed by: NURSE PRACTITIONER

## 2025-04-11 PROCEDURE — 1160F RVW MEDS BY RX/DR IN RCRD: CPT | Mod: CPTII,,, | Performed by: NURSE PRACTITIONER

## 2025-04-11 PROCEDURE — 85025 COMPLETE CBC W/AUTO DIFF WBC: CPT | Performed by: EMERGENCY MEDICINE

## 2025-04-11 PROCEDURE — 3052F HG A1C>EQUAL 8.0%<EQUAL 9.0%: CPT | Mod: CPTII,,, | Performed by: NURSE PRACTITIONER

## 2025-04-11 PROCEDURE — 96372 THER/PROPH/DIAG INJ SC/IM: CPT | Performed by: EMERGENCY MEDICINE

## 2025-04-11 PROCEDURE — 99215 OFFICE O/P EST HI 40 MIN: CPT | Mod: PBBFAC,25,PN | Performed by: NURSE PRACTITIONER

## 2025-04-11 PROCEDURE — 93005 ELECTROCARDIOGRAM TRACING: CPT

## 2025-04-11 PROCEDURE — 93010 ELECTROCARDIOGRAM REPORT: CPT | Mod: ,,, | Performed by: INTERNAL MEDICINE

## 2025-04-11 PROCEDURE — 3008F BODY MASS INDEX DOCD: CPT | Mod: CPTII,,, | Performed by: NURSE PRACTITIONER

## 2025-04-11 PROCEDURE — 99285 EMERGENCY DEPT VISIT HI MDM: CPT | Mod: 25,27

## 2025-04-11 PROCEDURE — 1159F MED LIST DOCD IN RCRD: CPT | Mod: CPTII,,, | Performed by: NURSE PRACTITIONER

## 2025-04-11 PROCEDURE — 25000003 PHARM REV CODE 250: Performed by: EMERGENCY MEDICINE

## 2025-04-11 PROCEDURE — 3072F LOW RISK FOR RETINOPATHY: CPT | Mod: CPTII,,, | Performed by: NURSE PRACTITIONER

## 2025-04-11 PROCEDURE — 3074F SYST BP LT 130 MM HG: CPT | Mod: CPTII,,, | Performed by: NURSE PRACTITIONER

## 2025-04-11 PROCEDURE — 83690 ASSAY OF LIPASE: CPT | Performed by: EMERGENCY MEDICINE

## 2025-04-11 PROCEDURE — 81003 URINALYSIS AUTO W/O SCOPE: CPT | Performed by: EMERGENCY MEDICINE

## 2025-04-11 PROCEDURE — 84460 ALANINE AMINO (ALT) (SGPT): CPT | Performed by: EMERGENCY MEDICINE

## 2025-04-11 PROCEDURE — 99999 PR PBB SHADOW E&M-EST. PATIENT-LVL V: CPT | Mod: PBBFAC,,, | Performed by: NURSE PRACTITIONER

## 2025-04-11 PROCEDURE — 84484 ASSAY OF TROPONIN QUANT: CPT | Performed by: EMERGENCY MEDICINE

## 2025-04-11 RX ORDER — DICYCLOMINE HYDROCHLORIDE 10 MG/ML
20 INJECTION INTRAMUSCULAR
Status: COMPLETED | OUTPATIENT
Start: 2025-04-11 | End: 2025-04-11

## 2025-04-11 RX ORDER — CARIPRAZINE 1.5 MG/1
1.5 CAPSULE, GELATIN COATED ORAL DAILY
Qty: 30 CAPSULE | Refills: 0 | Status: SHIPPED | OUTPATIENT
Start: 2025-04-11 | End: 2025-05-11

## 2025-04-11 RX ORDER — ALPRAZOLAM 0.25 MG/1
0.25 TABLET ORAL
Status: COMPLETED | OUTPATIENT
Start: 2025-04-11 | End: 2025-04-11

## 2025-04-11 RX ORDER — ALPRAZOLAM 1 MG/1
1 TABLET ORAL 2 TIMES DAILY PRN
Qty: 60 TABLET | Refills: 2 | Status: SHIPPED | OUTPATIENT
Start: 2025-04-11 | End: 2025-07-10

## 2025-04-11 RX ADMIN — ALPRAZOLAM 0.25 MG: 0.25 TABLET ORAL at 01:04

## 2025-04-11 RX ADMIN — DICYCLOMINE HYDROCHLORIDE 20 MG: 20 INJECTION, SOLUTION INTRAMUSCULAR at 01:04

## 2025-04-11 NOTE — ED PROVIDER NOTES
SCRIBE #1 NOTE: I, Patel Romo, am scribing for, and in the presence of, Bette Guevara MD. I have scribed the entire note.       History     Chief Complaint   Patient presents with    General Illness     Patient c/o constipation and chest pain, but refusing to elaborate or answer questions from both EMS and nursing staff. Patient was picked up at her physicians office, but the staff was not dealing with the patient and was not around. Patient refusing to open her eyes or communicate with anyone screaming at this RN, I have seizures!        Review of patient's allergies indicates:   Allergen Reactions    Codeine Itching    Hydromorphone Other (See Comments)     Can't wake up for long time if taken    Slow to wake up after surgery after receiving    Aleve [naproxen sodium]      Increases BP    Ibuprofen      Increases BP    Neuromuscular blockers, steroidal Hives     some    Pregabalin Itching    Latex, natural rubber Rash    Morphine Rash     itching    Norco [hydrocodone-acetaminophen] Itching, Rash and Hallucinations    Secobarbital sodium Rash     itching    Tylox [oxycodone-acetaminophen] Rash         History of Present Illness     HPI    4/11/2025, 11:30 AM  History obtained from the patient and medical records      History of Present Illness: Yolanda Betts is a 54 y.o. female patient with a PMHx of anxiety, bipolar 1, DMII, GERD, HLD, HTN, ALEN, schizoaffective disorder, seizures, and anemia who presents to the Emergency Department for evaluation of generalized illness which began 1 week ago. Pt was picked up from physician's office. Pt states she'd been constipated and nauseous for ~1 week and not eating. Pt has also been experiencing a headache and CP. Pt is concerned regarding low iron levels. Symptoms are constant and moderate in severity. No mitigating or exacerbating factors reported. Associated sxs include fatigue. Patient denies any vomiting, fever, abdominal pain, or SOB. Prior Tx  includes miralax.  No further complaints or concerns at this time.       Arrival mode: Ambulance Service    PCP: Rose Khan DNP        Past Medical History:  Past Medical History:   Diagnosis Date    Abnormal Pap smear of cervix     HPV genital warts    Anemia     Anxiety     Arthritis     Asthma 10/11/2016    Bipolar 1 disorder     Diabetes mellitus, type 2     Dyslipidemia associated with type 2 diabetes mellitus 05/13/2019    Dyspnea due to COVID-19 09/12/2024    General anesthetics causing adverse effect in therapeutic use     Genital warts     GERD (gastroesophageal reflux disease)     Herpes simplex virus (HSV) infection     Hyperlipidemia     Hypertension     Hypertension complicating diabetes 05/05/2019    Migraine with aura and without status migrainosus, not intractable 03/21/2016    Mild persistent asthma without complication 10/11/2016    Morbid obesity with body mass index (BMI) of 45.0 to 49.9 in adult 08/03/2017    Myoclonus     Obstructive sleep apnea     ALEN (obstructive sleep apnea)     2L per N/C q HS    Schizoaffective disorder, bipolar type 04/25/2019    Seasonal allergic rhinitis due to pollen 05/13/2019    Seizures     Type 2 diabetes mellitus with hyperglycemia, with long-term current use of insulin 12/21/2017    Type 2 diabetes mellitus with hyperglycemia, without long-term current use of insulin 12/21/2017       Past Surgical History:  Past Surgical History:   Procedure Laterality Date    abcess removed right back      ANGIOGRAM, CORONARY, WITH LEFT HEART CATHETERIZATION N/A 9/27/2024    Procedure: Angiogram, Coronary, with Left Heart Cath;  Surgeon: Jaimie Wills MD;  Location: [facility] CATH LAB;  Service: Cardiology;  Laterality: N/A;    BELT ABDOMINOPLASTY  1996    BREAST SURGERY Bilateral 1996    Reduction    CARPAL TUNNEL RELEASE Bilateral 12/18/2023    Procedure: RELEASE, CARPAL TUNNEL;  Surgeon: Neville Roth MD;  Location: [facility] OR;  Service: Orthopedics;   Laterality: Bilateral;  bilateral carpal tunnel release     SECTION      X 2    COLONOSCOPY      COLONOSCOPY N/A 2018    Procedure: colonoscopy for iron deficiency anemia;  Surgeon: Frankie Sanchez MD;  Location: [facility] ENDO;  Service: Endoscopy;  Laterality: N/A;    COLONOSCOPY N/A 2018    Procedure: COLONOSCOPY;  Surgeon: Frankie Sanchez MD;  Location: [facility] ENDO;  Service: Endoscopy;  Laterality: N/A;    COLONOSCOPY N/A 3/10/2023    Procedure: COLONOSCOPY;  Surgeon: Lalita Montes De Oca MD;  Location: [facility] ENDO;  Service: Endoscopy;  Laterality: N/A;    COLONOSCOPY N/A 2024    Procedure: COLONOSCOPY;  Surgeon: Tara Og MD;  Location: [facility] ENDO;  Service: Endoscopy;  Laterality: N/A;    EPIDURAL STEROID INJECTION INTO CERVICAL SPINE N/A 2023    Procedure: C6/7 IL ROCAEL RN IV Sedation;  Surgeon: Otto Phillips MD;  Location: [facility];  Service: Pain Management;  Laterality: N/A;    EPIDURAL STEROID INJECTION INTO CERVICAL SPINE N/A 2024    Procedure: C5/6 IL ROCAEL;  Surgeon: Otto Phillips MD;  Location: [facility];  Service: Pain Management;  Laterality: N/A;    ESOPHAGOGASTRODUODENOSCOPY N/A 3/10/2023    Procedure: EGD (ESOPHAGOGASTRODUODENOSCOPY);  Surgeon: Lalita Montes De Oca MD;  Location: [facility];  Service: Endoscopy;  Laterality: N/A;    HYSTERECTOMY      w/ BSO; hypermenorrhea    INJECTION OF ANESTHETIC AGENT AROUND MEDIAL BRANCH NERVES INNERVATING CERVICAL FACET JOINT Bilateral 2023    Procedure: Bilateral C5-7 MBB (diagnostic);  Surgeon: Otto Phillips MD;  Location: [facility];  Service: Pain Management;  Laterality: Bilateral;    MOUTH SURGERY  1996    OOPHORECTOMY      hyst/bso, hypermenorrhea    ROBOT-ASSISTED CHOLECYSTECTOMY USING DA DARI XI N/A 1/3/2020    Procedure: XI ROBOTIC CHOLECYSTECTOMY;  Surgeon: Damir Driscoll MD;  Location: [facility] OR;  Service: General;  Laterality: N/A;    TOTAL REDUCTION MAMMOPLASTY      TUBAL LIGATION  "          Family History:  Family History   Problem Relation Name Age of Onset    Diabetes Mother      Hyperlipidemia Mother      Hypertension Mother      Asthma Mother      COPD Mother      Glaucoma Mother      Thyroid disease Mother      Anesthesia problems Mother          "almost had a cardiac arrest" , blood clots    Hypertension Father      Hyperlipidemia Father      Cancer Father          Brain, lung, liver, kidney    No Known Problems Sister      Hypertension Brother      Alcohol abuse Brother      Heart disease Maternal Grandmother      Hyperlipidemia Maternal Grandmother      Hypertension Maternal Grandmother      Cataracts Maternal Grandmother      Diabetes Maternal Grandmother      Heart disease Maternal Grandfather      Hyperlipidemia Maternal Grandfather      Hypertension Maternal Grandfather      Glaucoma Maternal Grandfather      Cancer Maternal Grandfather      Cataracts Maternal Grandfather      Macular degeneration Maternal Grandfather      Diabetes Maternal Grandfather      Heart disease Paternal Grandmother      Hyperlipidemia Paternal Grandmother      Hypertension Paternal Grandmother      Cataracts Paternal Grandmother      Heart disease Paternal Grandfather      Hyperlipidemia Paternal Grandfather      Hypertension Paternal Grandfather      Cataracts Paternal Grandfather      Breast cancer Maternal Cousin      Breast cancer Maternal Cousin      Breast cancer Maternal Cousin      Breast cancer Maternal Cousin         Social History:  Social History     Tobacco Use    Smoking status: Never     Passive exposure: Never    Smokeless tobacco: Never   Substance and Sexual Activity    Alcohol use: Not Currently     Comment: socially  No alcohol 72h prior to sx    Drug use: No    Sexual activity: Yes     Partners: Male     Birth control/protection: Surgical     Comment: john        Review of Systems     Review of Systems   Constitutional:  Positive for fatigue. Negative for fever.   HENT:  Negative for " sore throat.    Respiratory:  Negative for shortness of breath.    Cardiovascular:  Positive for chest pain.   Gastrointestinal:  Positive for constipation and nausea. Negative for abdominal pain and vomiting.   Genitourinary:  Negative for dysuria.   Musculoskeletal:  Negative for back pain.   Skin:  Negative for rash.   Neurological:  Negative for weakness.   Hematological:  Does not bruise/bleed easily.   All other systems reviewed and are negative.       Physical Exam     Initial Vitals [04/11/25 1124]   BP Pulse Resp Temp SpO2   (!) 142/73 90 16 98.5 °F (36.9 °C) 95 %      MAP       --          Physical Exam  Nursing Notes and Vital Signs Reviewed.  Constitutional: Patient is in no acute distress. Well-developed and well-nourished.  Head: Atraumatic. Normocephalic.  Eyes: PERRL. EOM intact. Conjunctivae are not pale. No scleral icterus.  ENT: Mucous membranes are moist. Oropharynx is clear and symmetric.    Neck: Supple. Full ROM. No lymphadenopathy.  Cardiovascular: Regular rate. Regular rhythm. No murmurs, rubs, or gallops. Distal pulses are 2+ and symmetric.  Pulmonary/Chest: No respiratory distress. Clear to auscultation bilaterally. No wheezing or rales.  Abdominal: Obese. Soft and non-distended.  There is no tenderness.  No rebound, guarding, or rigidity. Good bowel sounds.  Genitourinary: No CVA tenderness.  Musculoskeletal: Moves all extremities. No obvious deformities. No edema. No calf tenderness.  Skin: Warm and dry.  Neurological:  Alert, awake, and appropriate.  Normal speech.  No acute focal neurological deficits are appreciated.  Psychiatric: Normal affect. Poor eye contact. Appropriate in content.     ED Course   Procedures  ED Vital Signs:  Vitals:    04/11/25 1124 04/11/25 1151 04/11/25 1206 04/11/25 1230   BP: (!) 142/73 124/83  (!) 155/96   Pulse: 90 82 82 82   Resp: 16 18  (!) 23   Temp: 98.5 °F (36.9 °C)      TempSrc: Oral      SpO2: 95% 96%  98%   Weight:  96.5 kg (212 lb 11.9 oz)          Abnormal Lab Results:  Labs Reviewed   COMPREHENSIVE METABOLIC PANEL - Abnormal       Result Value    Sodium 137      Potassium 4.1      Chloride 104      CO2 25      Glucose 116 (*)     BUN 11      Creatinine 1.0      Calcium 9.9      Protein Total 8.0      Albumin 3.7      Bilirubin Total 0.2      ALP 91      AST 28      ALT 33      Anion Gap 8      eGFR >60     URINALYSIS, REFLEX TO URINE CULTURE - Abnormal    Color, UA Yellow      Appearance, UA Hazy (*)     pH, UA 6.0      Spec Grav UA >=1.030 (*)     Protein, UA 1+ (*)     Glucose, UA Negative      Ketones, UA Trace (*)     Bilirubin, UA Negative      Blood, UA Negative      Nitrites, UA Negative      Urobilinogen, UA 2.0-3.0 (*)     Leukocyte Esterase, UA Trace (*)    CBC WITH DIFFERENTIAL - Abnormal    WBC 9.51      RBC 4.42      HGB 9.7 (*)     HCT 34.1 (*)     MCV 77 (*)     MCH 21.9 (*)     MCHC 28.4 (*)     RDW 18.5 (*)     Platelet Count 369      MPV 9.1 (*)     Nucleated RBC 0      Neut % 67.5      Lymph % 25.4      Mono % 5.7      Eos % 0.6      Basophil % 0.3      Imm Grans % 0.5      Neut # 6.41      Lymph # 2.42      Mono # 0.54      Eos # 0.06      Baso # 0.03      Imm Grans # 0.05 (*)    URINALYSIS MICROSCOPIC - Abnormal    RBC, UA 15 (*)     WBC, UA 6 (*)     Bacteria, UA Rare      Squamous Epithelial Cells, UA 11      Hyaline Casts, UA 10 (*)     Unclassified Crystals Few      Microscopic Comment       HIV 1 / 2 ANTIBODY - Normal    HIV 1/2 Ag/Ab Negative     LIPASE - Normal    Lipase Level 8     TROPONIN I - Normal    Troponin-I <0.006     CBC W/ AUTO DIFFERENTIAL    Narrative:     The following orders were created for panel order CBC W/ AUTO DIFFERENTIAL.  Procedure                               Abnormality         Status                     ---------                               -----------         ------                     CBC with Differential[[id number]]       Abnormal            Final result                 Please view results  for these tests on the individual orders.   [facility] URINE HOLD        All Lab Results:  Results for orders placed or performed during the hospital encounter of 04/11/25   EKG 12-lead    Collection Time: 04/11/25 11:53 AM   Result Value Ref Range    QRS Duration 82 ms    [facility] QTC Calculation 431 ms   HIV 1/2 Ag/Ab (4th Gen)    Collection Time: 04/11/25 12:05 PM   Result Value Ref Range    HIV 1/2 Ag/Ab Negative Negative   Comp. Metabolic Panel    Collection Time: 04/11/25 12:05 PM   Result Value Ref Range    Sodium 137 136 - 145 mmol/L    Potassium 4.1 3.5 - 5.1 mmol/L    Chloride 104 95 - 110 mmol/L    CO2 25 23 - 29 mmol/L    Glucose 116 (H) 70 - 110 mg/dL    BUN 11 6 - 20 mg/dL    Creatinine 1.0 0.5 - 1.4 mg/dL    Calcium 9.9 8.7 - 10.5 mg/dL    Protein Total 8.0 6.0 - 8.4 gm/dL    Albumin 3.7 3.5 - 5.2 g/dL    Bilirubin Total 0.2 0.1 - 1.0 mg/dL    ALP 91 40 - 150 unit/L    AST 28 11 - 45 unit/L    ALT 33 10 - 44 unit/L    Anion Gap 8 8 - 16 mmol/L    eGFR >60 >60 mL/min/1.73/m2   Lipase    Collection Time: 04/11/25 12:05 PM   Result Value Ref Range    Lipase Level 8 4 - 60 U/L   Troponin I    Collection Time: 04/11/25 12:05 PM   Result Value Ref Range    Troponin-I <0.006 <=0.026 ng/mL   CBC with Differential    Collection Time: 04/11/25 12:05 PM   Result Value Ref Range    WBC 9.51 3.90 - 12.70 K/uL    RBC 4.42 4.00 - 5.40 M/uL    HGB 9.7 (L) 12.0 - 16.0 gm/dL    HCT 34.1 (L) 37.0 - 48.5 %    MCV 77 (L) 82 - 98 fL    MCH 21.9 (L) 27.0 - 31.0 pg    MCHC 28.4 (L) 32.0 - 36.0 g/dL    RDW 18.5 (H) 11.5 - 14.5 %    Platelet Count 369 150 - 450 K/uL    MPV 9.1 (L) 9.2 - 12.9 fL    Nucleated RBC 0 <=0 /100 WBC    Neut % 67.5 38 - 73 %    Lymph % 25.4 18 - 48 %    Mono % 5.7 4 - 15 %    Eos % 0.6 <=8 %    Basophil % 0.3 <=1.9 %    Imm Grans % 0.5 0.0 - 0.5 %    Neut # 6.41 1.8 - 7.7 K/uL    Lymph # 2.42 1 - 4.8 K/uL    Mono # 0.54 0.3 - 1 K/uL    Eos # 0.06 <=0.5 K/uL    Baso # 0.03 <=0.2 K/uL    Imm Grans # 0.05 (H)  0.00 - 0.04 K/uL   Urinalysis, Reflex to Urine Culture    Collection Time: 04/11/25 12:18 PM    Specimen: Urine   Result Value Ref Range    Color, UA Yellow Straw, Milagros, Yellow, Light-Orange    Appearance, UA Hazy (A) Clear    pH, UA 6.0 5.0 - 8.0    Spec Grav UA >=1.030 (A) 1.005 - 1.030    Protein, UA 1+ (A) Negative    Glucose, UA Negative Negative    Ketones, UA Trace (A) Negative    Bilirubin, UA Negative Negative    Blood, UA Negative Negative    Nitrites, UA Negative Negative    Urobilinogen, UA 2.0-3.0 (A) <2.0 EU/dL    Leukocyte Esterase, UA Trace (A) Negative   Urinalysis Microscopic    Collection Time: 04/11/25 12:18 PM   Result Value Ref Range    RBC, UA 15 (H) 0 - 4 /HPF    WBC, UA 6 (H) 0 - 5 /HPF    Bacteria, UA Rare None, Rare, Occasional /HPF    Squamous Epithelial Cells, UA 11 /HPF    Hyaline Casts, UA 10 (H) 0 - 1 /LPF    Unclassified Crystals Few None, Rare, Occasional, Few, Moderate    Microscopic Comment       *Note: Due to a large number of results and/or encounters for the requested time period, some results have not been displayed. A complete set of results can be found in Results Review.       Imaging Results:  Imaging Results              X-Ray Abdomen Flat And Erect (Final result)  Result time 04/11/25 13:08:26      Final result by Pelon Frye MD (04/11/25 13:08:26)                   Impression:      As above.      Electronically signed by: Pelon Frye  Date:    04/11/2025  Time:    13:08               Narrative:    EXAMINATION:  XR ABDOMEN FLAT AND ERECT    CLINICAL HISTORY:  constipation;    TECHNIQUE:  Flat and erect AP views of the abdomen were performed.    COMPARISON:  None    FINDINGS:  Air-fluid levels over the colon concerning for diarrheal illness.  Findings do not specifically suggest constipation.                                       The EKG was ordered, reviewed, and independently interpreted by the ED provider.  Interpretation time: 11:53 AM  Rate: 79 BPM  Rhythm:  normal sinus rhythm  Interpretation: T wave abnormality, consider inferior ischemia. No STEMI.             The Emergency Provider reviewed the vital signs and test results, which are outlined above.     ED Discussion     2:15 PM: Reassessed pt at this time. Discussed with patient and/or family/caretaker all pertinent ED information and results. Discussed pt dx and plan of tx. Gave the patient all f/u and return to the ED instructions. All questions and concerns were addressed at this time. Patient and/or family/caretaker expresses understanding of information and instructions, and is comfortable with plan to discharge. Pt is stable for discharge.     I discussed with patient and/or family/caretaker that evaluation in the ED does not suggest any emergent or life threatening medical conditions requiring immediate intervention beyond what was provided in the ED, and I believe patient is safe for discharge.  Regardless, an unremarkable evaluation in the ED does not preclude the development or presence of a serious of life threatening condition. As such, I instructed that the patient is to return immediately for any worsening or change in current symptoms.           Medical Decision Making  DDX: 1. Viral Gastroenteritis 2. Obstruction 3. Gastritis 4 GERD    ECG NSR, no acute ishcemic changes, xray abdomen no concerns for obstruction, wbc normal, h/h chronically low and stable, lipase and troponin normal, VSS, given bentyl and xanax, abdominal exam is benign overall appears stable for discharge, no vomiting or diarrhea in th ER.     Amount and/or Complexity of Data Reviewed  Labs: ordered. Decision-making details documented in ED Course.  Radiology: ordered. Decision-making details documented in ED Course.  ECG/medicine tests: ordered and independent interpretation performed. Decision-making details documented in ED Course.    Risk  Prescription drug management.                ED Medication(s):  Medications   dicyclomine  injection 20 mg (20 mg Intramuscular Given 4/11/25 1312)   ALPRAZolam tablet 0.25 mg (0.25 mg Oral Given 4/11/25 1312)       New Prescriptions    No medications on file        Follow-up Information       [facility]. Schedule an appointment as soon as possible for a visit in 2 days.    Specialty: Gastroenterology  Why: Return to the Emergency Room, If symptoms worsen  Contact information:  [address] [facility] [address] [phone]                               Scribe Attestation:   Scribe #1: I performed the above scribed service and the documentation accurately describes the services I performed. I attest to the accuracy of the note.     Attending:   Physician Attestation Statement for Scribe #1: I, Bette Guevara MD, personally performed the services described in this documentation, as scribed by Patel Romo, in my presence, and it is both accurate and complete.           Clinical Impression       ICD-10-CM ICD-9-CM   1. Chest pain  R07.9 786.50   2. Morbid obesity  E66.01 278.01   3. Chronic constipation  K59.09 564.00   4. Enteritis  K52.9 558.9       Disposition:   Disposition: Discharged  Condition: Stable        Bette Guevara MD  04/16/25 [id number]

## 2025-04-11 NOTE — PROGRESS NOTES
"Outpatient Psychiatry Follow-Up Visit    4/11/2025    Virtual Visit    The patient location is: Patient's home/ Patient reported that his/her location at the time of this visit was in the [facility]     Visit type: Virtual visit with synchronous audio and video     Each patient to whom he or she provides medical services by telehealth is: (1) informed of the relationship between the medical psychologist and patient and the respective role of any other health care provider with respect to management of the patient; and (2) notified that he or she may decline to receive medical services by telehealth and may withdraw from such care at any time.    I also informed patient of the following:   Mariam Young, PhD, MPAP:  [place] medical license number: [id number]    My contact info:  [facility] / 2nd Floor  [address] [place] [address]   Ph: [phone]    If technology issues, call office phone: Ph: [phone] or [phone]  If crisis: Dial 911 or go to nearest Emergency Room (ER)  If questions related to privacy practices: contact [facility] Information Department: [phone]    Preferred Name: Yolanda  Gender Identity: cis female  Preferred Pronouns: she/her      History of Present Illness    CHIEF COMPLAINT:  Yolanda presents for follow-up of schizoaffective disorder and anxiety, with her last visit in January.    HPI:  Yolanda reports high anxiety and inadequately addressed mood. She describes racing thoughts and difficulty focusing, stating, "My mind is going everywhere. I can't do one thing and I forget what I'm supposed to do." Yolanda feels like she has ADHD, walking back and forth, unable to settle or concentrate on activities like reading the Bible. She reports poor sleep, mentioning she's been up since 4 AM, likely related to mood disturbances and possibly part of her manic symptoms. Yolanda is experiencing symptoms suggestive of satya, including racing " "thoughts, inability to settle, and increased activity. She reports a sensation of "bed bugs" in her bed, describing it as "feels like things are biting me." She does not see any bumps but experiences a feeling of being bitten. This could be related to her psychiatric symptoms or possibly an allergic reaction. Yolanda mentions experiencing hand tremors, stating, "I can barely keep my phone; I just shake, my hands just shake." She reports no noticeable difference with the increased lamotrigine dosage. Yolanda has a history of trying multiple antipsychotic medications, including Vraylar, Caplyta, Abilify, Seroquel, Risperidone, and Latuda, with varying effects and some discontinuations due to side effects or interactions. She mentions sometimes going to Taoism and sometimes staying at home to view services online.    MEDICATIONS:  Yolanda is on Lamotrigine 100 mg twice daily, which has been increased from her previous dose. She is also taking Xanax 1 mg twice daily and Ambien CR 6.25 mg at night for sleep. She is on a very low dose of Topamax for migraines but is considering discontinuation due to lack of efficacy. Cymbalta has been prescribed by another provider for neuropathy. Yolanda has tried and discontinued several medications in the past: Vraylar 4.5 mg due to satya and hallucinations, Caplyta due to seizures, Mirtazapine (Remeron) due to weight gain, and Abilify, Seroquel, Risperidone, and Latuda for unspecified reasons.    LIFESTYLE:  Yolanda attends Taoism, sometimes in person and sometimes views it online via Zoom. She tries to read the Bible as a hobby. Her social activities include attending Taoism or staying at home to view it on Zoom.      ROS:  Constitutional: +difficulty falling asleep, +difficulty staying asleep  Neurological: +tremors  Psychiatric: +anxiety, +thought or thinking problems or concerns, +racing thoughts, +hallucinations, +agitation       PSYCHIATRIC: Pertinant items are noted in the narrative.    Past " "Medical, Family and Social History: The patient's past medical, family and social history have been reviewed and updated as appropriate within the electronic medical record - see encounter notes.     since January 2025.              4/11/2025     7:22 AM 1/23/2025    11:26 AM   GAD7   1. Feeling nervous, anxious, or on edge? 1 3   2. Not being able to stop or control worrying? 3 3   3. Worrying too much about different things? 3 3   4. Trouble relaxing? 3 3   5. Being so restless that it is hard to sit still? 3 3   6. Becoming easily annoyed or irritable? 3 3   7. Feeling afraid as if something awful might happen? 3 3   8. If you checked off any problems, how difficult have these problems made it for you to do your work, take care of things at home, or get along with other people? 3 3   JOANN-7 Score 19  21        Patient-reported      0-4 = Minimal anxiety  5-9 = Mild anxiety  10-14 = Moderate anxiety  15-21 = Severe anxiety       Risk Parameters:  Patient reports no suicidal ideation  Patient reports no homicidal ideation  Patient reports no self-injurious behavior  Patient reports no violent behavior    Exam (detailed: at least 9 elements; comprehensive: all 15 elements)   Constitutional  Vitals:  Most recent vital signs were reviewed.   Last 3 sets of Vitals        4/1/2025     9:22 AM 4/1/2025     2:02 PM 4/11/2025     9:45 AM   Vitals - 1 value per visit   SYSTOLIC 104  101   DIASTOLIC 78  80   Pulse 77  89   Temp   98.6 °F (37 °C)   Resp 16     SPO2 98 %  98 %   Weight (lb) 202.01 202 202   Weight (kg) 91.63 91.627 91.627   Height 4' 8" (1.422 m) 4' 8" (1.422 m) 4' 8" (1.422 m)   BMI (Calculated) 45.3 45.3 45.3   Pain Score Nine Eight Ten          General:  age appropriate, casually dressed, neatly groomed     Musculoskeletal  Muscle Strength/Tone:  no tremor, no tic, reported shaking but not observed during virtual visit   Gait & Station:  video visit     Psychiatric  Speech:  no latency; no press "   Behavior: wnl   Mood & Affect:  anxious  congruent and appropriate   Thought Process:  normal and logical   Associations:  intact   Thought Content:  normal, no suicidality, no homicidality, delusions, or paranoia   Insight:  has awareness of illness   Judgement: behavior is adequate to circumstances   Orientation:  grossly intact   Memory: intact for content of interview   Language: grossly intact   Attention Span & Concentration:  Grossly intact   Fund of Knowledge:  intact and appropriate to age and level of education     General Impression:     Encounter Diagnoses   Name Primary?    Schizoaffective disorder, bipolar type Yes    Generalized anxiety disorder     Anxiety about health     Insomnia due to other mental disorder        Assessment & Plan    - Assessed response to increased lamotrigine; insufficient for managing schizoaffective disorder symptoms.  - Initiated retry of Vraylar 1.5 mg daily for at least 4 weeks before considering dose increase, due to potential for better efficacy without concurrent Mirtazapine and at a different dosage of Lamotrigine.  - Maintained current Ambien CR 6.25 mg at night, suspecting sleep issues are related to satya rather than medication ineffectiveness.  - Attributed ADHD-like symptoms (racing thoughts, inability to focus) to satya, expecting improvement with the addition of Vraylar.  - Plan to reassess Vraylar's effectiveness in 2-4 weeks for potential dosage adjustment.    SCHIZOAFFECTIVE DISORDER, BIPOLAR TYPE:  - Continued Lamotrigine 100 mg twice daily.  - Initiated retry of Vraylar 1.5 mg daily for at least 4 weeks before considering dose increase.  - Send message within 2 weeks regarding Vraylar effects.  - Contact office if no difference noted after 2 weeks on Vraylar to consider increasing to 3 mg.    ANXIETY DISORDER:  - Continued Xanax 1 mg twice daily.    SLEEP DISORDER:  - Maintained current Ambien CR 6.25 mg at night.    GENERAL HEALTH AND WELLNESS:  -  Recommend attending Islam in person rather than viewing online when possible.  - Recommend walking outside for air, sunshine, and movement, as physically tolerated and before it gets too hot.    FOLLOW-UP:  - Follow up in 4-6 weeks, in-office.         I spent an additional 17 minutes performing E/M services with >50% spent on counseling, guidance, coordinating care (not Psychotherapy related) in addition to the 16 minutes performing Psychotherapy.    I spent a total of 33 minutes on the day of the visit. This includes face to face time and non-face to face time preparing to see the patient (eg, review of tests), obtaining and/or reviewing separately obtained history, documenting clinical information in the electronic or other health record, independently interpreting results and communicating results to the patient/family/caregiver, or care coordinator.        Mariam Young, PhD, MP  Advanced Practice Medical Psychologist  [facility] [address]  [place] [address]  [phone]   [phone] fax    This note was generated with the assistance of ambient listening technology. Verbal consent was obtained by the patient and accompanying visitor(s) for the recording of patient appointment to facilitate this note. I attest to having reviewed and edited the generated note for accuracy, though some syntax or spelling errors may persist. Please contact the author of this note for any clarification.

## 2025-04-11 NOTE — PATIENT INSTRUCTIONS

## 2025-04-12 ENCOUNTER — HOSPITAL ENCOUNTER (EMERGENCY)
Facility: HOSPITAL | Age: 55
Discharge: HOME | End: 2025-04-13
Attending: EMERGENCY MEDICINE
Payer: MEDICAID

## 2025-04-12 VITALS
HEART RATE: 82 BPM | BODY MASS INDEX: 44.68 KG/M2 | TEMPERATURE: 98 F | RESPIRATION RATE: 16 BRPM | SYSTOLIC BLOOD PRESSURE: 133 MMHG | OXYGEN SATURATION: 100 % | WEIGHT: 199.31 LBS | DIASTOLIC BLOOD PRESSURE: 63 MMHG

## 2025-04-12 DIAGNOSIS — R53.1 WEAKNESS: Primary | ICD-10-CM

## 2025-04-12 LAB
ABSOLUTE EOSINOPHIL (OHS): 0.07 K/UL
ABSOLUTE MONOCYTE (OHS): 0.57 K/UL (ref 0.3–1)
ABSOLUTE NEUTROPHIL COUNT (OHS): 4.38 K/UL (ref 1.8–7.7)
ALBUMIN SERPL BCP-MCNC: 3.6 G/DL (ref 3.5–5.2)
ALP SERPL-CCNC: 97 UNIT/L (ref 40–150)
ALT SERPL W/O P-5'-P-CCNC: 30 UNIT/L (ref 10–44)
AMPHET UR QL SCN: NEGATIVE
ANION GAP (OHS): 7 MMOL/L (ref 8–16)
APAP SERPL-MCNC: <3 UG/ML (ref 10–20)
AST SERPL-CCNC: 22 UNIT/L (ref 11–45)
BARBITURATE SCN PRESENT UR: NEGATIVE
BASOPHILS # BLD AUTO: 0.03 K/UL
BASOPHILS NFR BLD AUTO: 0.4 %
BENZODIAZ UR QL SCN: ABNORMAL
BILIRUB SERPL-MCNC: 0.2 MG/DL (ref 0.1–1)
BILIRUB UR QL STRIP.AUTO: NEGATIVE
BUN SERPL-MCNC: 10 MG/DL (ref 6–20)
CALCIUM SERPL-MCNC: 9.4 MG/DL (ref 8.7–10.5)
CANNABINOIDS UR QL SCN: NEGATIVE
CHLORIDE SERPL-SCNC: 105 MMOL/L (ref 95–110)
CLARITY UR: CLEAR
CO2 SERPL-SCNC: 28 MMOL/L (ref 23–29)
COCAINE UR QL SCN: NEGATIVE
COLOR UR AUTO: YELLOW
CREAT SERPL-MCNC: 0.8 MG/DL (ref 0.5–1.4)
CREAT UR-MCNC: 138 MG/DL (ref 15–325)
ERYTHROCYTE [DISTWIDTH] IN BLOOD BY AUTOMATED COUNT: 19 % (ref 11.5–14.5)
ETHANOL SERPL-MCNC: <10 MG/DL
GFR SERPLBLD CREATININE-BSD FMLA CKD-EPI: >60 ML/MIN/1.73/M2
GLUCOSE SERPL-MCNC: 85 MG/DL (ref 70–110)
GLUCOSE UR QL STRIP: ABNORMAL
HCT VFR BLD AUTO: 33.7 % (ref 37–48.5)
HGB BLD-MCNC: 9.5 GM/DL (ref 12–16)
HGB UR QL STRIP: NEGATIVE
IMM GRANULOCYTES # BLD AUTO: 0.04 K/UL (ref 0–0.04)
IMM GRANULOCYTES NFR BLD AUTO: 0.5 % (ref 0–0.5)
INFLUENZA A MOLECULAR (OHS): NEGATIVE
INFLUENZA B MOLECULAR (OHS): NEGATIVE
KETONES UR QL STRIP: NEGATIVE
LACTATE SERPL-SCNC: 1.3 MMOL/L (ref 0.5–2.2)
LEUKOCYTE ESTERASE UR QL STRIP: NEGATIVE
LYMPHOCYTES # BLD AUTO: 3.31 K/UL (ref 1–4.8)
MAGNESIUM SERPL-MCNC: 2 MG/DL (ref 1.6–2.6)
MCH RBC QN AUTO: 21.8 PG (ref 27–31)
MCHC RBC AUTO-ENTMCNC: 28.2 G/DL (ref 32–36)
MCV RBC AUTO: 78 FL (ref 82–98)
METHADONE UR QL SCN: NEGATIVE
NITRITE UR QL STRIP: NEGATIVE
NUCLEATED RBC (/100WBC) (OHS): 0 /100 WBC
OPIATES UR QL SCN: NEGATIVE
PCP UR QL: NEGATIVE
PH UR STRIP: 6 [PH]
PLATELET # BLD AUTO: 378 K/UL (ref 150–450)
PMV BLD AUTO: 8.8 FL (ref 9.2–12.9)
POCT GLUCOSE: 151 MG/DL (ref 70–110)
POTASSIUM SERPL-SCNC: 4.1 MMOL/L (ref 3.5–5.1)
PROT SERPL-MCNC: 7.7 GM/DL (ref 6–8.4)
PROT UR QL STRIP: NEGATIVE
RBC # BLD AUTO: 4.35 M/UL (ref 4–5.4)
RELATIVE EOSINOPHIL (OHS): 0.8 %
RELATIVE LYMPHOCYTE (OHS): 39.4 % (ref 18–48)
RELATIVE MONOCYTE (OHS): 6.8 % (ref 4–15)
RELATIVE NEUTROPHIL (OHS): 52.1 % (ref 38–73)
SALICYLATES SERPL-MCNC: <5 MG/DL (ref 15–30)
SARS-COV-2 RDRP RESP QL NAA+PROBE: NEGATIVE
SODIUM SERPL-SCNC: 140 MMOL/L (ref 136–145)
SP GR UR STRIP: 1.02
TROPONIN I SERPL DL<=0.01 NG/ML-MCNC: <0.006 NG/ML
TSH SERPL-ACNC: 1.7 UIU/ML (ref 0.4–4)
UROBILINOGEN UR STRIP-ACNC: ABNORMAL EU/DL
WBC # BLD AUTO: 8.4 K/UL (ref 3.9–12.7)

## 2025-04-12 PROCEDURE — 85025 COMPLETE CBC W/AUTO DIFF WBC: CPT | Performed by: EMERGENCY MEDICINE

## 2025-04-12 PROCEDURE — 84443 ASSAY THYROID STIM HORMONE: CPT | Performed by: EMERGENCY MEDICINE

## 2025-04-12 PROCEDURE — 93010 ELECTROCARDIOGRAM REPORT: CPT | Mod: ,,, | Performed by: INTERNAL MEDICINE

## 2025-04-12 PROCEDURE — 25500020 PHARM REV CODE 255: Performed by: EMERGENCY MEDICINE

## 2025-04-12 PROCEDURE — 94640 AIRWAY INHALATION TREATMENT: CPT

## 2025-04-12 PROCEDURE — 80143 DRUG ASSAY ACETAMINOPHEN: CPT | Performed by: EMERGENCY MEDICINE

## 2025-04-12 PROCEDURE — 25000242 PHARM REV CODE 250 ALT 637 W/ HCPCS: Performed by: EMERGENCY MEDICINE

## 2025-04-12 PROCEDURE — 83735 ASSAY OF MAGNESIUM: CPT | Performed by: EMERGENCY MEDICINE

## 2025-04-12 PROCEDURE — 80179 DRUG ASSAY SALICYLATE: CPT | Performed by: EMERGENCY MEDICINE

## 2025-04-12 PROCEDURE — 80307 DRUG TEST PRSMV CHEM ANLYZR: CPT | Performed by: EMERGENCY MEDICINE

## 2025-04-12 PROCEDURE — 84484 ASSAY OF TROPONIN QUANT: CPT | Performed by: EMERGENCY MEDICINE

## 2025-04-12 PROCEDURE — 81003 URINALYSIS AUTO W/O SCOPE: CPT | Performed by: EMERGENCY MEDICINE

## 2025-04-12 PROCEDURE — 82077 ASSAY SPEC XCP UR&BREATH IA: CPT | Performed by: EMERGENCY MEDICINE

## 2025-04-12 PROCEDURE — 25000003 PHARM REV CODE 250: Performed by: EMERGENCY MEDICINE

## 2025-04-12 PROCEDURE — 99285 EMERGENCY DEPT VISIT HI MDM: CPT | Mod: 25

## 2025-04-12 PROCEDURE — U0002 COVID-19 LAB TEST NON-CDC: HCPCS | Performed by: EMERGENCY MEDICINE

## 2025-04-12 PROCEDURE — 83605 ASSAY OF LACTIC ACID: CPT | Performed by: EMERGENCY MEDICINE

## 2025-04-12 PROCEDURE — 82962 GLUCOSE BLOOD TEST: CPT

## 2025-04-12 PROCEDURE — 93005 ELECTROCARDIOGRAM TRACING: CPT

## 2025-04-12 PROCEDURE — 84075 ASSAY ALKALINE PHOSPHATASE: CPT | Performed by: EMERGENCY MEDICINE

## 2025-04-12 PROCEDURE — 87502 INFLUENZA DNA AMP PROBE: CPT | Performed by: EMERGENCY MEDICINE

## 2025-04-12 RX ORDER — BENZONATATE 100 MG/1
100 CAPSULE ORAL ONCE
Status: COMPLETED | OUTPATIENT
Start: 2025-04-12 | End: 2025-04-12

## 2025-04-12 RX ORDER — IPRATROPIUM BROMIDE AND ALBUTEROL SULFATE 2.5; .5 MG/3ML; MG/3ML
3 SOLUTION RESPIRATORY (INHALATION)
Status: COMPLETED | OUTPATIENT
Start: 2025-04-12 | End: 2025-04-12

## 2025-04-12 RX ADMIN — BENZONATATE 100 MG: 100 CAPSULE ORAL at 11:04

## 2025-04-12 RX ADMIN — IOHEXOL 100 ML: 350 INJECTION, SOLUTION INTRAVENOUS at 09:04

## 2025-04-12 RX ADMIN — IPRATROPIUM BROMIDE AND ALBUTEROL SULFATE 3 ML: 2.5; .5 SOLUTION RESPIRATORY (INHALATION) at 11:04

## 2025-04-12 NOTE — ED PROVIDER NOTES
"SCRIBE #1 NOTE: I, Bright Kay, am scribing for, and in the presence of, Buffy Lai DO. I have scribed the entire note.       History     Chief Complaint   Patient presents with    Fatigue     Per EMS, patient's family found her on the floor because she laid down there saying she was "too weak to do anything", Patient refusing to follow commands, refusing to answer triage questions. When arms held up by nurse to gauge weakness or drift, patient refusing to participate in evaluation. Patient was here yesterday and was refusing to speak to EMS or triage RN also        Review of patient's allergies indicates:   Allergen Reactions    Codeine Itching    Hydromorphone Other (See Comments)     Can't wake up for long time if taken    Slow to wake up after surgery after receiving    Aleve [naproxen sodium]      Increases BP    Ibuprofen      Increases BP    Neuromuscular blockers, steroidal Hives     some    Pregabalin Itching    Latex, natural rubber Rash    Morphine Rash     itching    Norco [hydrocodone-acetaminophen] Itching, Rash and Hallucinations    Secobarbital sodium Rash     itching    Tylox [oxycodone-acetaminophen] Rash         History of Present Illness     HPI    4/12/2025, 6:35 PM  History obtained from the patient, medical records, and EMS      History of Present Illness: Yolanda Betts is a 54 y.o. female patient with a PMHx of anemia, anxiety, Bipolar 1 disorder, GERD, asthma, DM Type 2, arthritis, HTN, schizoaffective disorder, ALEN, dyslipidemia, morbid obesity, HLD, and seizures who presents to the Emergency Department for evaluation of generalized weakness which began about two weeks ago. Pt states she has been feeling weak for about two weeks now which worsen this morning, causing her to have to lay on the floor. Per EMS, patient's family found her on the floor because she laid down there, stating she was "too weak to do anything." Per daughter, pt is normally able to ambulate without " difficulty. Symptoms are constant and moderate in severity. No mitigating or exacerbating factors reported. Associated sxs include photophobia, body aches, nausea, mild SOB, loss of appetite, and HA. Patient denies any syncope, LOC, fever, CP, unilateral weakness, vomiting, diarrhea, constipation, difficulty urinating, or dysuria. No prior Tx specified.  Pt denies smoking or EtOH use. Per daughter, pt has a hx of seizures since November 2024, on Keppra. No further complaints or concerns at this time.       Arrival mode: EMS    PCP: Rose hKan DNP        Past Medical History:  Past Medical History:   Diagnosis Date    Abnormal Pap smear of cervix     HPV genital warts    Anemia     Anxiety     Arthritis     Asthma 10/11/2016    Bipolar 1 disorder     Diabetes mellitus, type 2     Dyslipidemia associated with type 2 diabetes mellitus 05/13/2019    Dyspnea due to COVID-19 09/12/2024    General anesthetics causing adverse effect in therapeutic use     Genital warts     GERD (gastroesophageal reflux disease)     Herpes simplex virus (HSV) infection     Hyperlipidemia     Hypertension     Hypertension complicating diabetes 05/05/2019    Migraine with aura and without status migrainosus, not intractable 03/21/2016    Mild persistent asthma without complication 10/11/2016    Morbid obesity with body mass index (BMI) of 45.0 to 49.9 in adult 08/03/2017    Myoclonus     Obstructive sleep apnea     ALEN (obstructive sleep apnea)     2L per N/C q HS    Schizoaffective disorder, bipolar type 04/25/2019    Seasonal allergic rhinitis due to pollen 05/13/2019    Seizures     Type 2 diabetes mellitus with hyperglycemia, with long-term current use of insulin 12/21/2017    Type 2 diabetes mellitus with hyperglycemia, without long-term current use of insulin 12/21/2017       Past Surgical History:  Past Surgical History:   Procedure Laterality Date    abcess removed right back      ANGIOGRAM, CORONARY, WITH LEFT HEART  CATHETERIZATION N/A 2024    Procedure: Angiogram, Coronary, with Left Heart Cath;  Surgeon: Jaimie Wills MD;  Location: [facility] CATH LAB;  Service: Cardiology;  Laterality: N/A;    BELT ABDOMINOPLASTY  1996    BREAST SURGERY Bilateral     Reduction    CARPAL TUNNEL RELEASE Bilateral 2023    Procedure: RELEASE, CARPAL TUNNEL;  Surgeon: Neville Roth MD;  Location: [facility] OR;  Service: Orthopedics;  Laterality: Bilateral;  bilateral carpal tunnel release     SECTION      X 2    COLONOSCOPY      COLONOSCOPY N/A 2018    Procedure: colonoscopy for iron deficiency anemia;  Surgeon: Frankie Sanchez MD;  Location: [facility];  Service: Endoscopy;  Laterality: N/A;    COLONOSCOPY N/A 2018    Procedure: COLONOSCOPY;  Surgeon: Frankie Sanchez MD;  Location: [facility];  Service: Endoscopy;  Laterality: N/A;    COLONOSCOPY N/A 3/10/2023    Procedure: COLONOSCOPY;  Surgeon: Lalita Montes De Oca MD;  Location: [facility];  Service: Endoscopy;  Laterality: N/A;    COLONOSCOPY N/A 2024    Procedure: COLONOSCOPY;  Surgeon: Tara Og MD;  Location: [facility];  Service: Endoscopy;  Laterality: N/A;    EPIDURAL STEROID INJECTION INTO CERVICAL SPINE N/A 2023    Procedure: C6/7 IL ROCAEL RN IV Sedation;  Surgeon: Otto Phillips MD;  Location: [facility] PAIN MGT;  Service: Pain Management;  Laterality: N/A;    EPIDURAL STEROID INJECTION INTO CERVICAL SPINE N/A 2024    Procedure: C5/6 IL ROCAEL;  Surgeon: Otto Phillips MD;  Location: [facility] PAIN MGT;  Service: Pain Management;  Laterality: N/A;    ESOPHAGOGASTRODUODENOSCOPY N/A 3/10/2023    Procedure: EGD (ESOPHAGOGASTRODUODENOSCOPY);  Surgeon: Lalita Montes De Oca MD;  Location: [facility];  Service: Endoscopy;  Laterality: N/A;    HYSTERECTOMY      w/ BSO; hypermenorrhea    INJECTION OF ANESTHETIC AGENT AROUND MEDIAL BRANCH NERVES INNERVATING CERVICAL FACET JOINT Bilateral 2023    Procedure: Bilateral C5-7 MBB  "(diagnostic);  Surgeon: Otto Phillips MD;  Location[facility] PAIN MGT;  Service: Pain Management;  Laterality: Bilateral;    MOUTH SURGERY  1996    OOPHORECTOMY      hyst/bso, hypermenorrhea    ROBOT-ASSISTED CHOLECYSTECTOMY USING DA DARI XI N/A 1/3/2020    Procedure: XI ROBOTIC CHOLECYSTECTOMY;  Surgeon: Damir Driscoll MD;  Location: [facility] OR;  Service: General;  Laterality: N/A;    TOTAL REDUCTION MAMMOPLASTY      TUBAL LIGATION           Family History:  Family History   Problem Relation Name Age of Onset    Diabetes Mother      Hyperlipidemia Mother      Hypertension Mother      Asthma Mother      COPD Mother      Glaucoma Mother      Thyroid disease Mother      Anesthesia problems Mother          "almost had a cardiac arrest" , blood clots    Hypertension Father      Hyperlipidemia Father      Cancer Father          Brain, lung, liver, kidney    No Known Problems Sister      Hypertension Brother      Alcohol abuse Brother      Heart disease Maternal Grandmother      Hyperlipidemia Maternal Grandmother      Hypertension Maternal Grandmother      Cataracts Maternal Grandmother      Diabetes Maternal Grandmother      Heart disease Maternal Grandfather      Hyperlipidemia Maternal Grandfather      Hypertension Maternal Grandfather      Glaucoma Maternal Grandfather      Cancer Maternal Grandfather      Cataracts Maternal Grandfather      Macular degeneration Maternal Grandfather      Diabetes Maternal Grandfather      Heart disease Paternal Grandmother      Hyperlipidemia Paternal Grandmother      Hypertension Paternal Grandmother      Cataracts Paternal Grandmother      Heart disease Paternal Grandfather      Hyperlipidemia Paternal Grandfather      Hypertension Paternal Grandfather      Cataracts Paternal Grandfather      Breast cancer Maternal Cousin      Breast cancer Maternal Cousin      Breast cancer Maternal Cousin      Breast cancer Maternal Cousin         Social History:  Social History     Tobacco " Use    Smoking status: Never     Passive exposure: Never    Smokeless tobacco: Never   Substance and Sexual Activity    Alcohol use: Not Currently     Comment: socially  No alcohol 72h prior to sx    Drug use: No    Sexual activity: Yes     Partners: Male     Birth control/protection: Surgical     Comment: hy        Review of Systems     Review of Systems   Constitutional:  Positive for appetite change. Negative for fever.   Eyes:  Positive for photophobia.   Respiratory:  Positive for shortness of breath.    Cardiovascular:  Negative for chest pain.   Gastrointestinal:  Positive for nausea. Negative for constipation, diarrhea and vomiting.   Genitourinary:  Negative for difficulty urinating and dysuria.   Musculoskeletal:         (+) body aches   Neurological:  Positive for weakness and headaches. Negative for syncope.        (-) LOC      Physical Exam     Initial Vitals   BP Pulse Resp Temp SpO2   04/12/25 1748 04/12/25 1748 04/12/25 1748 04/12/25 1809 04/12/25 1748   (!) 145/84 102 (!) 24 98.4 °F (36.9 °C) 98 %      MAP       --                 Physical Exam  Nursing Notes and Vital Signs Reviewed.  Constitutional: Patient is in no acute distress. Generalized weakness.   Head: Atraumatic. Normocephalic.  Eyes: PERRL. EOM intact. No scleral icterus.  ENT: Mucous membranes are moist. Oropharynx is clear and symmetric.    Cardiovascular: Regular rate. Regular rhythm. No murmurs, rubs, or gallops. Radial and DP pulses are 2+ and symmetric.  Pulmonary/Chest: No respiratory distress. Clear to auscultation bilaterally. No wheezing or rales.  Abdominal: Soft and non-distended.  There is no tenderness.  No rebound, guarding, or rigidity.   Musculoskeletal: Moves all extremities. No obvious deformities. No edema.   Skin: Warm and dry.  Neurological:  Alert, awake, and oriented.  Normal speech.  No acute focal neurological deficits are appreciated.  Psychiatric: Uncooperative.      ED Course   Procedures  ED Vital  "Signs:  Vitals:    04/12/25 1748 04/12/25 1809 04/12/25 1815 04/12/25 1830   BP: (!) 145/84  (!) 149/72 (!) 152/71   Pulse: 102  96 92   Resp: (!) 24  (!) 22 15   Temp:  98.4 °F (36.9 °C)     TempSrc:  Axillary     SpO2: 98%  96% 95%   Weight:  90.4 kg (199 lb 4.7 oz)      04/12/25 1842 04/12/25 2210 04/12/25 2333 04/12/25 2350   BP:    133/63   Pulse: 93 87 80 82   Resp:   16    Temp:    98.2 °F (36.8 °C)   TempSrc:       SpO2:  100% 99% 100%   Weight:           Abnormal Lab Results:  Labs Reviewed   COMPREHENSIVE METABOLIC PANEL - Abnormal       Result Value    Sodium 140      Potassium 4.1      Chloride 105      CO2 28      Glucose 85      BUN 10      Creatinine 0.8      Calcium 9.4      Protein Total 7.7      Albumin 3.6      Bilirubin Total 0.2      ALP 97      AST 22      ALT 30      Anion Gap 7 (*)     eGFR >60     DRUG SCREEN PANEL, URINE EMERGENCY - Abnormal    Benzodiazepine, Urine Presumptive Positive (*)     Methadone, Urine Negative      Cocaine, Urine Negative      Opiates, Urine Negative      Barbiturates, Urine Negative      Amphetamines, Urine Negative      THC Negative      Phencyclidine, Urine Negative      Urine Creatinine 138.0      Narrative:     This screen includes the following classes of drugs at the listed cut-off:     Benzodiazepines:        200 ng/ml   Methadone:              300 ng/ml   Cocaine metabolite:     300 ng/ml   Opiates:                300 ng/ml   Barbiturates:           200 ng/ml   Amphetamines:           1000 ng/ml   Marijuana metabs (THC): 50 ng/ml   Phencyclidine (PCP):    25 ng/ml     This is a screening test. If results do not correlate with clinical presentation, then a confirmatory send out test is advised.    This report is intended for use in clinical monitoring and management of patients. It is not intended for use in employment related drug testing."   URINALYSIS, REFLEX TO URINE CULTURE - Abnormal    Color, UA Yellow      Appearance, UA Clear      pH, UA 6.0      " Spec Grav UA 1.025      Protein, UA Negative      Glucose, UA Trace (*)     Ketones, UA Negative      Bilirubin, UA Negative      Blood, UA Negative      Nitrites, UA Negative      Urobilinogen, UA 2.0-3.0 (*)     Leukocyte Esterase, UA Negative     ACETAMINOPHEN LEVEL - Abnormal    Acetaminophen Level <3.0 (*)    SALICYLATE LEVEL - Abnormal    Salicylate Level <5.0 (*)    CBC WITH DIFFERENTIAL - Abnormal    WBC 8.40      RBC 4.35      HGB 9.5 (*)     HCT 33.7 (*)     MCV 78 (*)     MCH 21.8 (*)     MCHC 28.2 (*)     RDW 19.0 (*)     Platelet Count 378      MPV 8.8 (*)     Nucleated RBC 0      Neut % 52.1      Lymph % 39.4      Mono % 6.8      Eos % 0.8      Basophil % 0.4      Imm Grans % 0.5      Neut # 4.38      Lymph # 3.31      Mono # 0.57      Eos # 0.07      Baso # 0.03      Imm Grans # 0.04     POCT GLUCOSE - Abnormal    POCT Glucose 151 (*)    INFLUENZA A & B BY MOLECULAR - Normal    INFLUENZA A MOLECULAR Negative      INFLUENZA B MOLECULAR  Negative     LACTIC ACID, PLASMA - Normal    Lactic Acid Level 1.3      Narrative:     Falsely low lactic acid results can be found in samples containing >=13.0 mg/dL total bilirubin and/or >=3.5 mg/dL direct bilirubin.    MAGNESIUM - Normal    Magnesium  2.0     TROPONIN I - Normal    Troponin-I <0.006     TSH - Normal    TSH 1.703     ALCOHOL,MEDICAL (ETHANOL) - Normal    Alcohol, Serum <10     SARS-COV-2 RNA AMPLIFICATION, QUAL - Normal    SARS COV-2 Molecular Negative     CBC W/ AUTO DIFFERENTIAL    Narrative:     The following orders were created for panel order CBC auto differential.  Procedure                               Abnormality         Status                     ---------                               -----------         ------                     CBC with Differential[[id number]]       Abnormal            Final result                 Please view results for these tests on the individual orders.   GRE[facility] URINE HOLD        All Lab Results:  Results for  orders placed or performed during the hospital encounter of 04/12/25   POCT glucose    Collection Time: 04/12/25  6:10 PM   Result Value Ref Range    POCT Glucose 151 (H) 70 - 110 mg/dL   Influenza A & B by Molecular    Collection Time: 04/12/25  6:45 PM    Specimen: Nasal Swab   Result Value Ref Range    INFLUENZA A MOLECULAR Negative Negative    INFLUENZA B MOLECULAR  Negative Negative   COVID-19 Rapid Screening    Collection Time: 04/12/25  6:45 PM   Result Value Ref Range    SARS COV-2 Molecular Negative Negative   Drug screen panel, emergency    Collection Time: 04/12/25  7:51 PM   Result Value Ref Range    Benzodiazepine, Urine Presumptive Positive (A) Negative    Methadone, Urine Negative Negative    Cocaine, Urine Negative Negative    Opiates, Urine Negative Negative    Barbiturates, Urine Negative Negative    Amphetamines, Urine Negative Negative    THC Negative Negative    Phencyclidine, Urine Negative Negative    Urine Creatinine 138.0 15.0 - 325.0 mg/dL   Urinalysis, Reflex to Urine Culture    Collection Time: 04/12/25  7:51 PM    Specimen: Urine, Clean Catch   Result Value Ref Range    Color, UA Yellow Straw, Milagros, Yellow, Light-Orange    Appearance, UA Clear Clear    pH, UA 6.0 5.0 - 8.0    Spec Grav UA 1.025 1.005 - 1.030    Protein, UA Negative Negative    Glucose, UA Trace (A) Negative    Ketones, UA Negative Negative    Bilirubin, UA Negative Negative    Blood, UA Negative Negative    Nitrites, UA Negative Negative    Urobilinogen, UA 2.0-3.0 (A) <2.0 EU/dL    Leukocyte Esterase, UA Negative Negative   Comprehensive metabolic panel    Collection Time: 04/12/25  8:09 PM   Result Value Ref Range    Sodium 140 136 - 145 mmol/L    Potassium 4.1 3.5 - 5.1 mmol/L    Chloride 105 95 - 110 mmol/L    CO2 28 23 - 29 mmol/L    Glucose 85 70 - 110 mg/dL    BUN 10 6 - 20 mg/dL    Creatinine 0.8 0.5 - 1.4 mg/dL    Calcium 9.4 8.7 - 10.5 mg/dL    Protein Total 7.7 6.0 - 8.4 gm/dL    Albumin 3.6 3.5 - 5.2 g/dL     Bilirubin Total 0.2 0.1 - 1.0 mg/dL    ALP 97 40 - 150 unit/L    AST 22 11 - 45 unit/L    ALT 30 10 - 44 unit/L    Anion Gap 7 (L) 8 - 16 mmol/L    eGFR >60 >60 mL/min/1.73/m2   Lactic acid, plasma    Collection Time: 04/12/25  8:09 PM   Result Value Ref Range    Lactic Acid Level 1.3 0.5 - 2.2 mmol/L   Magnesium    Collection Time: 04/12/25  8:09 PM   Result Value Ref Range    Magnesium  2.0 1.6 - 2.6 mg/dL   Troponin I    Collection Time: 04/12/25  8:09 PM   Result Value Ref Range    Troponin-I <0.006 <=0.026 ng/mL   TSH    Collection Time: 04/12/25  8:09 PM   Result Value Ref Range    TSH 1.703 0.400 - 4.000 uIU/mL   Ethanol    Collection Time: 04/12/25  8:09 PM   Result Value Ref Range    Alcohol, Serum <10 <10 mg/dL   Acetaminophen level    Collection Time: 04/12/25  8:09 PM   Result Value Ref Range    Acetaminophen Level <3.0 (L) 10.0 - 20.0 ug/ml   Salicylate level    Collection Time: 04/12/25  8:09 PM   Result Value Ref Range    Salicylate Level <5.0 (L) 15.0 - 30.0 mg/dL   CBC with Differential    Collection Time: 04/12/25  8:09 PM   Result Value Ref Range    WBC 8.40 3.90 - 12.70 K/uL    RBC 4.35 4.00 - 5.40 M/uL    HGB 9.5 (L) 12.0 - 16.0 gm/dL    HCT 33.7 (L) 37.0 - 48.5 %    MCV 78 (L) 82 - 98 fL    MCH 21.8 (L) 27.0 - 31.0 pg    MCHC 28.2 (L) 32.0 - 36.0 g/dL    RDW 19.0 (H) 11.5 - 14.5 %    Platelet Count 378 150 - 450 K/uL    MPV 8.8 (L) 9.2 - 12.9 fL    Nucleated RBC 0 <=0 /100 WBC    Neut % 52.1 38 - 73 %    Lymph % 39.4 18 - 48 %    Mono % 6.8 4 - 15 %    Eos % 0.8 <=8 %    Basophil % 0.4 <=1.9 %    Imm Grans % 0.5 0.0 - 0.5 %    Neut # 4.38 1.8 - 7.7 K/uL    Lymph # 3.31 1 - 4.8 K/uL    Mono # 0.57 0.3 - 1 K/uL    Eos # 0.07 <=0.5 K/uL    Baso # 0.03 <=0.2 K/uL    Imm Grans # 0.04 0.00 - 0.04 K/uL     *Note: Due to a large number of results and/or encounters for the requested time period, some results have not been displayed. A complete set of results can be found in Results Review.       Imaging  Results:  Imaging Results              CTA Head and Neck (xpd) (Final result)  Result time 04/12/25 21:50:28      Final result by Nolvia Justice MD (04/12/25 21:50:28)                   Impression:       Negative for large vessel occlusion or high-grade arterial stenosis      All CT scans at [this location] are performed using dose modulation techniques as appropriate to a performed exam including the following: automated exposure control; adjustment of the mA and/or kV according to patient size (this includes techniques or standardized protocols for targeted exams where dose is matched to indication / reason for exam; i.e. extremities or head); use of iterative reconstruction technique.    Finalized on: 4/12/2025 9:50 PM By:  Nolvia Justice MD  [facility]# [id number]      2025-04-12 21:52:35.317     [facility]               Narrative:    EXAM: CTA HEAD AND NECK (XPD)    CLINICAL INDICATION:  Neuro deficit stroke    TECHNIQUE: Angiographic technique with intravenous contrast enhancement, 2-D MIPS and postprocessing volume rendered and provided 3-D MIPS postprocessing rapid angiogram summary assessment; imaging head and neck vertex to hiram    NASCET criteria utilized for carotid artery stenosis    FINDINGS:  No large vessel occlusion or high-grade arterial stenosis identified.  No aneurysm or dissection seen.                                         X-Ray Chest AP Portable (Final result)  Result time 04/12/25 19:07:51   Procedure changed from X-Ray Chest PA And Lateral     Final result by Shaq Doshi MD (04/12/25 19:07:51)                   Impression:      1.  Negative for acute process involving the chest.  2.  Stable findings as noted above.    Finalized on: 4/12/2025 7:07 PM By:  Shaq Doshi MD  [facility]# [id number]      2025-04-12 19:09:56.795     [facility]               Narrative:    EXAM:  XR CHEST AP PORTABLE    CLINICAL INDICATION:  Shortness of breath    FINDINGS:  Frontal views of the chest were  obtained.    Comparisons are made to studies dating back to 11/30/2024. EKG leads overlie the chest, which is lordotic in position.  Mildly low lung volumes. The lungs are clear. The cardiac silhouette size is normal. The trachea is midline and the mediastinal width is normal. Negative for focal infiltrate, effusion or pneumothorax. Pulmonary vasculature is normal. Negative for osseous abnormalities.  Tortuous aorta.  Marginal spondylosis.  Hair artifact.                                         CT Head Without Contrast (Final result)  Result time 04/12/25 19:09:47      Final result by Nolvia Justice MD (04/12/25 19:09:47)                   Impression:       No acute findings are identified    All CT scans at [this location] are performed using dose modulation techniques as appropriate to a performed exam including the following: automated exposure control; adjustment of the mA and/or kV according to patient size (this includes techniques or standardized protocols for targeted exams where dose is matched to indication / reason for exam; i.e. extremities or head); use of iterative reconstruction technique.    Finalized on: 4/12/2025 7:09 PM By:  Nolvia Justice MD  [facility]# [id number]      2025-04-12 19:11:56.916     [facility]               Narrative:    EXAM: CT HEAD WITHOUT CONTRAST    CLINICAL INDICATION:  Weakness    TECHNIQUE: Axial and multiplanar 2-D reformations provided  noncontrast imaging    FINDINGS:  No acute cranial hemorrhage or mass effect.  No hydrocephalus.  No adverse change as correlated with February 2025.  No adverse bony finding                                         The EKG was ordered, reviewed, and independently interpreted by the ED provider.  Interpretation time: 18:07  Rate: 96 BPM  Rhythm: normal sinus rhythm  Interpretation: Cannot rule out Anterior infarct, age undetermined. ST and T wave abnormality, consider inferior ischemia. No STEMI.           The Emergency Provider reviewed the vital  signs and test results, which are outlined above.     ED Discussion     10:37 PM: Reassessed pt at this time. Discussed with patient and/or family/caretaker all pertinent ED information and results. Discussed pt dx and plan of tx. Gave the patient all f/u and return to the ED instructions. All questions and concerns were addressed at this time. Patient and/or family/caretaker expresses understanding of information and instructions, and is comfortable with plan to discharge. Pt is stable for discharge.     I discussed with patient and/or family/caretaker that evaluation in the ED does not suggest any emergent or life threatening medical conditions requiring immediate intervention beyond what was provided in the ED, and I believe patient is safe for discharge.  Regardless, an unremarkable evaluation in the ED does not preclude the development or presence of a serious of life threatening condition. As such, I instructed that the patient is to return immediately for any worsening or change in current symptoms.      ED Course as of 04/13/25 0031   Sat Apr 12, 2025   1826 EKG similar to previous. [NF]      ED Course User Index  [NF] Buffy Lai, DO     Medical Decision Making  54-year-old female presents with generalized weakness.  She has no acute focal neurological deficits in his initially uncooperative with the examination however begins to move all extremities equally.  CT imaging negative for intracranial hemorrhage, mass, large vessel occlusion, aneurysm, or dissection.  Cardiac workup negative.  No infectious findings.  H&H is at baseline.  Tox screen unremarkable.  Thyroid levels normal.  No fever or leukocytosis.  No renal insufficiency or electrolyte derangement.      Amount and/or Complexity of Data Reviewed  Independent Historian: caregiver and EMS     Details: Daughter at bedside and EMS  Labs: ordered. Decision-making details documented in ED Course.  Radiology: ordered. Decision-making details  documented in ED Course.  ECG/medicine tests: ordered and independent interpretation performed. Decision-making details documented in ED Course.    Risk  Prescription drug management.       Additional MDM:   Differential Diagnosis:   Hypokalemia, hypomagnesemia, hypoglycemia, infection, ischemic CVA, hemorrhagic stroke, large vessel occlusion, carotid artery dissection, thyroid abnormality, anemia, MI             ED Medication(s):  Medications   iohexoL (OMNIPAQUE 350) injection 100 mL (100 mLs Intravenous Given 4/12/25 2127)   albuterol-ipratropium 2.5 mg-0.5 mg/3 mL nebulizer solution 3 mL (3 mLs Nebulization Given 4/12/25 2339)   benzonatate capsule 100 mg (100 mg Oral Given 4/12/25 8685)       Discharge Medication List as of 4/12/2025 10:36 PM           Follow-up Information       Rose Khan, DNP On 4/14/2025.    Specialty: Family Medicine  Contact information:  [address]  [address]  [facility] [id number] [phone]               [facility] - Emergency Dept..    Specialty: Emergency Medicine  Why: As needed, If symptoms worsen  Contact information:  [address]  [facility] [address] [phone]                               Scribe Attestation:   Scribe #1: I performed the above scribed service and the documentation accurately describes the services I performed. I attest to the accuracy of the note.     Attending:   Physician Attestation Statement for Scribe #1: I, Buffy Lai DO, personally performed the services described in this documentation, as scribed by Bright Kay, in my presence, and it is both accurate and complete.           Clinical Impression       ICD-10-CM ICD-9-CM   1. Weakness  R53.1 780.79       Disposition:   Disposition: Discharged  Condition: Stable       Buffy Lai DO  04/13/25 0031

## 2025-04-13 LAB
OHS QRS DURATION: 82 MS
OHS QTC CALCULATION: 431 MS

## 2025-04-14 NOTE — PROGRESS NOTES
Subjective:       Patient ID: Yolanda Betts is a 54 y.o. female.    Chief Complaint: Headache, Constipation, Vaginitis, and Fatigue      History of Present Illness:   Yolanda Betts 54 y.o. female presents today with the following:   History of Present Illness    HPI:  Ms. Betts presents to clinic with chest pain, seizure activity, nausea, and vomiting. Ms. Betts reports intermittent chest pain and seizure activity. The seizures are associated with nausea and vomiting. She is currently in the clinic with active involuntary twitching. The seizures were severe enough to warrant EMS evaluation at the hospital. Between seizure episodes, she is easily responsive and arousable.    MEDICAL HISTORY:  Ms. Betts has a history of seizures.        Past Medical History:   Diagnosis Date    Abnormal Pap smear of cervix     HPV genital warts    Anemia     Anxiety     Arthritis     Asthma 10/11/2016    Bipolar 1 disorder     Diabetes mellitus, type 2     Dyslipidemia associated with type 2 diabetes mellitus 05/13/2019    Dyspnea due to COVID-19 09/12/2024    General anesthetics causing adverse effect in therapeutic use     Genital warts     GERD (gastroesophageal reflux disease)     Herpes simplex virus (HSV) infection     Hyperlipidemia     Hypertension     Hypertension complicating diabetes 05/05/2019    Migraine with aura and without status migrainosus, not intractable 03/21/2016    Mild persistent asthma without complication 10/11/2016    Morbid obesity with body mass index (BMI) of 45.0 to 49.9 in adult 08/03/2017    Myoclonus     Obstructive sleep apnea     ALEN (obstructive sleep apnea)     2L per N/C q HS    Schizoaffective disorder, bipolar type 04/25/2019    Seasonal allergic rhinitis due to pollen 05/13/2019    Seizures     Type 2 diabetes mellitus with hyperglycemia, with long-term current use of insulin 12/21/2017    Type 2 diabetes mellitus with hyperglycemia, without long-term current use of insulin  "12/21/2017     Family History   Problem Relation Name Age of Onset    Diabetes Mother      Hyperlipidemia Mother      Hypertension Mother      Asthma Mother      COPD Mother      Glaucoma Mother      Thyroid disease Mother      Anesthesia problems Mother          "almost had a cardiac arrest" , blood clots    Hypertension Father      Hyperlipidemia Father      Cancer Father          Brain, lung, liver, kidney    No Known Problems Sister      Hypertension Brother      Alcohol abuse Brother      Heart disease Maternal Grandmother      Hyperlipidemia Maternal Grandmother      Hypertension Maternal Grandmother      Cataracts Maternal Grandmother      Diabetes Maternal Grandmother      Heart disease Maternal Grandfather      Hyperlipidemia Maternal Grandfather      Hypertension Maternal Grandfather      Glaucoma Maternal Grandfather      Cancer Maternal Grandfather      Cataracts Maternal Grandfather      Macular degeneration Maternal Grandfather      Diabetes Maternal Grandfather      Heart disease Paternal Grandmother      Hyperlipidemia Paternal Grandmother      Hypertension Paternal Grandmother      Cataracts Paternal Grandmother      Heart disease Paternal Grandfather      Hyperlipidemia Paternal Grandfather      Hypertension Paternal Grandfather      Cataracts Paternal Grandfather      Breast cancer Maternal Cousin      Breast cancer Maternal Cousin      Breast cancer Maternal Cousin      Breast cancer Maternal Cousin       Social History[1]  Encounter Medications[2]    Review of Systems   Constitutional:  Positive for fatigue. Negative for activity change, appetite change and fever.   HENT:  Negative for congestion, ear discharge, ear pain, mouth sores, nosebleeds, sore throat and tinnitus.    Eyes:  Negative for discharge, redness and itching.   Respiratory:  Negative for apnea, cough and shortness of breath.    Cardiovascular:  Negative for chest pain and leg swelling.   Gastrointestinal:  Negative for abdominal " "distention, abdominal pain, blood in stool and constipation.   Endocrine: Negative for polydipsia, polyphagia and polyuria.   Genitourinary:  Negative for difficulty urinating, flank pain, frequency and hematuria.   Musculoskeletal:  Negative for back pain, gait problem, neck pain and neck stiffness.   Skin:  Negative for rash and wound.   Allergic/Immunologic: Negative for environmental allergies, food allergies and immunocompromised state.   Neurological:  Positive for dizziness and tremors. Negative for seizures, syncope, numbness and headaches.   Hematological:  Negative for adenopathy. Does not bruise/bleed easily.   Psychiatric/Behavioral:  Negative for agitation, confusion, hallucinations, self-injury and suicidal ideas.        Objective:   Physical Exam  Constitutional:       Appearance: She is well-developed. She is obese.   HENT:      Head: Normocephalic.      Right Ear: Tympanic membrane normal.      Left Ear: Tympanic membrane normal.      Nose: Nose normal.   Eyes:      Pupils: Pupils are equal, round, and reactive to light.   Cardiovascular:      Rate and Rhythm: Normal rate.      Heart sounds: Normal heart sounds.   Pulmonary:      Effort: Pulmonary effort is normal.      Breath sounds: Normal breath sounds.   Abdominal:      General: Bowel sounds are normal.      Palpations: Abdomen is soft.   Musculoskeletal:         General: Normal range of motion.   Skin:     General: Skin is warm and dry.   Neurological:      Mental Status: She is alert and oriented to person, place, and time.      GCS: GCS eye subscore is 4. GCS verbal subscore is 5. GCS motor subscore is 6.      Comments: Patient actively having seizure like activity that and indicates that she needs to go to ER. Ambulance called.    Psychiatric:         Behavior: Behavior normal.           /80 (BP Location: Left arm, Patient Position: Sitting)   Pulse 89   Temp 98.6 °F (37 °C) (Oral)   Ht 4' 8" (1.422 m)   Wt 91.6 kg (202 lb)   SpO2 " 98%   BMI 45.29 kg/m²   Physical Exam               Results for orders placed or performed in visit on 04/01/25   Spirometry with/without bronchodilator    Collection Time: 04/01/25  9:31 AM   Result Value Ref Range    Interpretation       Spirometry shows a low [id number] suggesting obstruction may be present. Baseline FVC is low however the post bronchodilator value is normal. Spirometry remains unimproved following bronchodilator.   Â   Notes:  Â   The failure to demonstrate improvement in spirometry does not preclude a clinical response to a trial of bronchodilators.       Post FVC 1.75 1.68 - 2.79 L    Post FEV1 1.45 1.36 - 2.25 L    Post FEV1 FVC 82.78 70.48 - 91.11 %    Post FEF 25 75 1.71 1.56 - 4.36 L/s    Post PEF 2.98 (L) 3.42 - 6.60 L/s    Post  6.74 sec    Pre FVC 1.68 (L) 1.68 - 2.79 L    Pre FEV1 1.39 1.36 - 2.25 L    Pre FEV1 FVC 82.50 70.48 - 91.11 %    Pre FEF 25 75 1.54 (L) 1.56 - 4.36 L/s    Pre PEF 3.93 3.42 - 6.60 L/s    Pre  6.61 sec    FVC Ref 2.22     FVC LLN 1.68     FVC Pre Ref 75.7 %    FVC Post Ref 78.6 %    FVC Chg 3.8 %    FEV1 Ref 1.82     FEV1 LLN 1.36     FEV1 Pre Ref 76.5 %    FEV1 Post Ref 79.7 %    FEV1 Chg 4.2 %    FEV1 FVC Ref 82     FEV1 FVC LLN 70     FEV1 FVC Pre Ref 101.1 %    FEV1 FVC Post Ref 101.4 %    FEV1 FVC Chg 0.3 %    FEF 25 75 Ref 2.96     FEF 25 75 LLN 1.56     FEF 25 75 Pre Ref 52.1 %    FEF 25 75 Post Ref 57.6 %    FEF 25 75 Chg 10.5 %    PEF Ref 5.01     PEF LLN 3.42     PEF Pre Ref 78.5 %    PEF Post Ref 59.6 %    PEF Chg -24.1 %    CEO989 Chg 2.1 %     *Note: Due to a large number of results and/or encounters for the requested time period, some results have not been displayed. A complete set of results can be found in Results Review.     Assessment:       1. Constipation, unspecified constipation type    Assessment & Plan    G40.901 Epilepsy, unspecified, not intractable, with status epilepticus  R07.9 Chest pain, unspecified    EPILEPSY:  - Ms.  Alpesh reports seizure activity on and off with nausea and vomiting.  - Observed involuntary twitching during the clinic visit.  - Noted that the patient is responsive and easily aroused between seizures.  - Assessed the patient's condition as experiencing ongoing seizure activity during the clinic visit.  - Called EMS for hospital evaluation of seizure activity.  - Instructed the patient to be evaluated at the hospital for seizure activity.    CHEST PAIN:  - Ms. Betts reports chest pain.  - Evaluated the patient's presenting complaint of chest pain.        Plan:   Constipation, unspecified constipation type  -     X-Ray Abdomen Flat And Erect; Future; Expected date: 04/11/2025      Today's encounter took a total time of 20 minutes, and that time included Preparing to see the patient (review records, tests), Obtaining and/or reviewing separately obtained historical data, Performing a medically appropriate examination and/or evaluation , Counseling & educating the patient/family/caregiver on treatment plan with chronic health conditions , ordering medications, tests, and/or procedures, Referring and communicating with other healthcare professionals , Documenting clinical information in the electronic or other health record, Independently interpreting results & communicating results to the patient/family/caregiver.           [facility]   [address] [place] [address] [address]  [place] [address]  Office [phone]  Fax [phone]   This note was generated with the assistance of ambient listening technology. Verbal consent was obtained by the patient and accompanying visitor(s) for the recording of patient appointment to facilitate this note. I attest to having reviewed and edited the generated note for accuracy, though some syntax or spelling errors may persist. Please contact the author of this note for any clarification.          [1]   Social History  Socioeconomic History    Marital  status: Single   Tobacco Use    Smoking status: Never     Passive exposure: Never    Smokeless tobacco: Never   Substance and Sexual Activity    Alcohol use: Not Currently     Comment: socially  No alcohol 72h prior to sx    Drug use: No    Sexual activity: Yes     Partners: Male     Birth control/protection: Surgical     Comment: hyst   Social History Narrative    Long-term care nurse     Social Drivers of Health     Financial Resource Strain: High Risk (2/20/2025)    Overall Financial Resource Strain (CARDIA)     Difficulty of Paying Living Expenses: Very hard   Food Insecurity: Food Insecurity Present (2/20/2025)    Hunger Vital Sign     Worried About Running Out of Food in the Last Year: Often true     Ran Out of Food in the Last Year: Often true   Transportation Needs: Unmet Transportation Needs (3/19/2025)    PRAPARE - Transportation     Lack of Transportation (Medical): Yes     Lack of Transportation (Non-Medical): Yes   Physical Activity: Insufficiently Active (2/20/2025)    Exercise Vital Sign     Days of Exercise per Week: 2 days     Minutes of Exercise per Session: 20 min   Stress: Stress Concern Present (2/20/2025)    [employer] - Occupational Stress Questionnaire     Feeling of Stress : Very much   Housing Stability: High Risk (2/20/2025)    Housing Stability Vital Sign     Unable to Pay for Housing in the Last Year: Yes     Number of Times Moved in the Last Year: 0     Homeless in the Last Year: No   [2]   Outpatient Encounter Medications as of 4/11/2025   Medication Sig Dispense Refill    acetaminophen (TYLENOL) 325 MG tablet Take 650 mg by mouth every 6 (six) hours as needed.      albuterol (PROVENTIL) 2.5 mg /3 mL (0.083 %) nebulizer solution Take 2.5 mg by nebulization every 4 (four) hours as needed. Times a day 75 mL 1    albuterol (PROVENTIL/VENTOLIN HFA) 90 mcg/actuation inhaler Inhale 2 puffs into the lungs every 6 (six) hours as needed for Wheezing. 8.5 g 1     "ALPRAZolam (XANAX) 1 MG tablet Take 1 tablet (1 mg total) by mouth 2 (two) times daily as needed for Anxiety. 60 tablet 2    amLODIPine (NORVASC) 10 MG tablet Take 1 tablet (10 mg total) by mouth once daily. 30 tablet 11    BD INSULIN SYRINGE ULTRA-FINE 1/2 mL 30 gauge x 1/2" Syrg   0    blood-glucose sensor (DEXCOM G6 SENSOR) Mariela change sensor every 10 days. 3 each 11    blood-glucose transmitter (DEXCOM G6 TRANSMITTER) Mariela 1 each by Misc.(Non-Drug; Combo Route) route every 3 (three) months. 1 each 3    cariprazine (VRAYLAR) 1.5 mg Cap Take 1 capsule (1.5 mg total) by mouth once daily. 30 capsule 0    cholecalciferol, vitamin D3, 1,250 mcg (50,000 unit) Tab Take 1 capsule by mouth every 7 days. 12 tablet 3    cyclobenzaprine (FLEXERIL) 10 MG tablet Take 1 tablet (10 mg total) by mouth 3 (three) times daily as needed (Pain). 90 tablet 11    DULoxetine (CYMBALTA) 60 MG capsule Take 1 capsule by mouth 2 times daily 180 capsule 3    ergocalciferol (ERGOCALCIFEROL) 50,000 unit Cap Take 1 capsule (50,000 Units total) by mouth every 7 days. 4 capsule 6    fenofibrate (TRICOR) 145 MG tablet Take 1 tablet by mouth in the morning. 30 tablet 5    [] fluconazole (DIFLUCAN) 150 MG Tab Take 1 tablet (150 mg total) by mouth once daily. for 5 days 5 tablet 0    fluticasone propionate (FLONASE) 50 mcg/actuation nasal spray Take 1 spray by Each Nostril route in the morning. 16 g 3    folic acid (FOLVITE) 1 MG tablet Take 1 tablet by mouth every day in the morning. 30 tablet 5    furosemide (LASIX) 20 MG tablet Take 1 tablet (20 mg total) by mouth once daily. 90 tablet 3    glucagon (BAQSIMI) 3 mg/actuation [place] SPRAY 1 SPRAY IN NOSTRIL AS NEEDED FOR EMERGENCY. MAY REPEAT 1 TIME AFTER 15 MINUTES 2 each 1    [] hydrocortisone (ANUSOL-HC) 2.5 % rectal cream Place rectally once daily. Needs internal applicator. for 10 days 30 g 2    insulin aspart U-100 (NOVOLOG FLEXPEN U-100 INSULIN) 100 unit/mL (3 mL) InPn pen Inject 9 " "Units into the skin 3 (three) times daily before meals. Use sliding scale for small medium and large meals 15 mL 3    insulin aspart U-100 (NOVOLOG U-100 INSULIN ASPART) 100 unit/mL injection To use via insulin pump. Up to 200 units every 2 days 30 mL 5    insulin glargine U-100, Lantus, (LANTUS SOLOSTAR U-100 INSULIN) 100 unit/mL (3 mL) InPn pen Inject 16 Units into the skin once daily. Use in the event of pump failure 15 mL 3    insulin pump cart,auto,BT,G6/7 (OMNIPOD 5 G6-G7 PODS, GEN 5,) Crtg 1 each by Misc.(Non-Drug; Combo Route) route every 48 hours. 45 each 3    insulin syringe-needle U-100 0.3 mL 30 gauge x 5/16" Syrg 1 each by Misc.(Non-Drug; Combo Route) route Every 3 (three) days. 100 each 2    lamoTRIgine (LAMICTAL) 100 MG tablet Take 1 tablet (100 mg total) by mouth 2 (two) times daily. 60 tablet 2    levETIRAcetam (KEPPRA) 500 MG Tab Take 1.5 tablets (750 mg total) by mouth 2 (two) times daily. 90 tablet 0    levocetirizine (XYZAL) 5 MG tablet Take 1 tablet (5 mg total) by mouth every evening. 30 tablet 11    LIDOcaine (LIDODERM) 5 % Place 2 patches onto the skin every 12 (twelve) hours as needed (pain). Remove & Discard patch within 12 hours or as directed by MD 60 patch 11    lifitegrast (XIIDRA) 5 % Dpet Place 1 drop into both eyes 2 (two) times daily. 60 each 12    linaCLOtide (LINZESS) 290 mcg Cap capsule Take 1 capsule (290 mcg total) by mouth before breakfast. 30 capsule 3    magnesium oxide (MAG-OX) 400 mg (241.3 mg magnesium) tablet Take 1 tablet (400 mg total) by mouth once daily. 90 tablet 3    melatonin (MELATIN) 3 mg tablet Take 2 tablets (6 mg total) by mouth nightly. 180 tablet 0    metoprolol succinate (TOPROL-XL) 50 MG 24 hr tablet Take 1 tablet (50 mg total) by mouth every morning. 30 tablet 11    omeprazole (PRILOSEC) 40 MG capsule Take 1 capsule (40 mg total) by mouth once daily. 90 capsule 1    ondansetron (ZOFRAN) 4 MG tablet Take 4 mg by mouth every 6 (six) hours.      OZEMPIC 2 " "mg/dose (8 mg/3 mL) PnIj Inject 2 mg into the skin every 7 days.      pen needle, diabetic (BD ULTRA-FINE MINI PEN NEEDLE) 31 gauge x 3/16" Ndle Use 1 a day in event of pump failure 100 each 11    polyethylene glycol (GLYCOLAX) 17 gram/dose powder Take 17 g by mouth 2 (two) times daily. 1020 g 11    secukinumab (COSENTYX PEN, 2 PENS,) 150 mg/mL PnIj Inject 300 mg into the skin every 14 (fourteen) days. 4 mL 11    SYMBICORT 160-4.5 mcg/actuation HFAA Inhale 2 puffs into the lungs in the morning and 2 puffs before bedtime. 2 puffs every 12 hours 10.2 g 2    topiramate (TOPAMAX) 25 MG tablet Take 1 tablet (25 mg total) by mouth every evening. 30 tablet 2    triamcinolone acetonide 0.1% (KENALOG) 0.1 % paste Apply coating 2 times daily 5 g 12    vitamin D3-vitamin K2 1,250-200 mcg Cap Take by mouth.      zolpidem (AMBIEN CR) 6.25 MG CR tablet Take 1 tablet (6.25 mg total) by mouth nightly as needed for Insomnia. 30 tablet 2    atorvastatin (LIPITOR) 20 MG tablet Take 1 tablet (20 mg total) by mouth every evening (Patient not taking: Reported on 4/11/2025) 90 tablet 3    estradioL (ESTRACE) 0.01 % (0.1 mg/gram) vaginal cream Place 1 g vaginally twice a week. 42.5 g 3    nystatin (MYCOSTATIN) 100,000 unit/mL suspension Take 4 mLs (400,000 Units total) by mouth 4 (four) times daily. for 10 days 160 mL 0    [DISCONTINUED] ALPRAZolam (XANAX) 1 MG tablet Take 1 tablet (1 mg total) by mouth 2 (two) times daily as needed for Anxiety. 60 tablet 2    [DISCONTINUED] clonazePAM (KLONOPIN) 1 MG tablet Take 1 tablet by mouth daily 30 tablet 0    [DISCONTINUED] glucagon, human recombinant, (GLUCAGON EMERGENCY KIT, HUMAN,) 1 mg SolR Inject 1 mg into the muscle as needed. Emergency use 1 each 1    [DISCONTINUED] valsartan (DIOVAN) 320 MG tablet Take 1 tablet (320 mg total) by mouth once daily. 90 tablet 3     No facility-administered encounter medications on file as of 4/11/2025.     "

## 2025-04-15 ENCOUNTER — TELEPHONE (OUTPATIENT)
Dept: PSYCHIATRY | Facility: CLINIC | Age: 55
End: 2025-04-15
Payer: MEDICAID

## 2025-04-15 ENCOUNTER — HOSPITAL ENCOUNTER (EMERGENCY)
Facility: HOSPITAL | Age: 55
Discharge: HOME | End: 2025-04-15
Attending: EMERGENCY MEDICINE
Payer: MEDICAID

## 2025-04-15 ENCOUNTER — PATIENT MESSAGE (OUTPATIENT)
Dept: OBSTETRICS AND GYNECOLOGY | Facility: CLINIC | Age: 55
End: 2025-04-15
Payer: MEDICAID

## 2025-04-15 VITALS
RESPIRATION RATE: 14 BRPM | OXYGEN SATURATION: 97 % | HEART RATE: 84 BPM | SYSTOLIC BLOOD PRESSURE: 125 MMHG | DIASTOLIC BLOOD PRESSURE: 71 MMHG

## 2025-04-15 DIAGNOSIS — R56.9 SEIZURE-LIKE ACTIVITY: Primary | ICD-10-CM

## 2025-04-15 LAB
ABSOLUTE EOSINOPHIL (OHS): 0.14 K/UL
ABSOLUTE MONOCYTE (OHS): 0.49 K/UL (ref 0.3–1)
ABSOLUTE NEUTROPHIL COUNT (OHS): 4.41 K/UL (ref 1.8–7.7)
ALBUMIN SERPL BCP-MCNC: 3.5 G/DL (ref 3.5–5.2)
ALP SERPL-CCNC: 87 UNIT/L (ref 40–150)
ALT SERPL W/O P-5'-P-CCNC: 28 UNIT/L (ref 10–44)
ANION GAP (OHS): 13 MMOL/L (ref 8–16)
AST SERPL-CCNC: 28 UNIT/L (ref 11–45)
BASOPHILS # BLD AUTO: 0.04 K/UL
BASOPHILS NFR BLD AUTO: 0.5 %
BILIRUB SERPL-MCNC: 0.2 MG/DL (ref 0.1–1)
BUN SERPL-MCNC: 9 MG/DL (ref 6–20)
CALCIUM SERPL-MCNC: 9.3 MG/DL (ref 8.7–10.5)
CHLORIDE SERPL-SCNC: 105 MMOL/L (ref 95–110)
CK SERPL-CCNC: 65 U/L (ref 20–180)
CO2 SERPL-SCNC: 21 MMOL/L (ref 23–29)
CREAT SERPL-MCNC: 0.7 MG/DL (ref 0.5–1.4)
ERYTHROCYTE [DISTWIDTH] IN BLOOD BY AUTOMATED COUNT: 18.5 % (ref 11.5–14.5)
GFR SERPLBLD CREATININE-BSD FMLA CKD-EPI: >60 ML/MIN/1.73/M2
GLUCOSE SERPL-MCNC: 123 MG/DL (ref 70–110)
HCT VFR BLD AUTO: 30.1 % (ref 37–48.5)
HGB BLD-MCNC: 8.7 GM/DL (ref 12–16)
IMM GRANULOCYTES # BLD AUTO: 0.04 K/UL (ref 0–0.04)
IMM GRANULOCYTES NFR BLD AUTO: 0.5 % (ref 0–0.5)
LYMPHOCYTES # BLD AUTO: 2.57 K/UL (ref 1–4.8)
MCH RBC QN AUTO: 22.2 PG (ref 27–31)
MCHC RBC AUTO-ENTMCNC: 28.9 G/DL (ref 32–36)
MCV RBC AUTO: 77 FL (ref 82–98)
NUCLEATED RBC (/100WBC) (OHS): 0 /100 WBC
PLATELET # BLD AUTO: 354 K/UL (ref 150–450)
PMV BLD AUTO: 9.1 FL (ref 9.2–12.9)
POTASSIUM SERPL-SCNC: 4.6 MMOL/L (ref 3.5–5.1)
PROT SERPL-MCNC: 7.2 GM/DL (ref 6–8.4)
RBC # BLD AUTO: 3.92 M/UL (ref 4–5.4)
RELATIVE EOSINOPHIL (OHS): 1.8 %
RELATIVE LYMPHOCYTE (OHS): 33.4 % (ref 18–48)
RELATIVE MONOCYTE (OHS): 6.4 % (ref 4–15)
RELATIVE NEUTROPHIL (OHS): 57.4 % (ref 38–73)
SODIUM SERPL-SCNC: 139 MMOL/L (ref 136–145)
WBC # BLD AUTO: 7.69 K/UL (ref 3.9–12.7)

## 2025-04-15 PROCEDURE — 82550 ASSAY OF CK (CPK): CPT | Performed by: EMERGENCY MEDICINE

## 2025-04-15 PROCEDURE — 80177 DRUG SCRN QUAN LEVETIRACETAM: CPT | Performed by: EMERGENCY MEDICINE

## 2025-04-15 PROCEDURE — 85025 COMPLETE CBC W/AUTO DIFF WBC: CPT | Performed by: EMERGENCY MEDICINE

## 2025-04-15 PROCEDURE — 82040 ASSAY OF SERUM ALBUMIN: CPT | Performed by: EMERGENCY MEDICINE

## 2025-04-15 PROCEDURE — 99284 EMERGENCY DEPT VISIT MOD MDM: CPT | Mod: 25

## 2025-04-15 NOTE — TELEPHONE ENCOUNTER
Who Called: Penny Betts   Symptoms (please be specific): hallucinations   How long has patient had these symptoms:    Pharmacy name and phone #:    Would the patient rather a call back or a response via [facility]? Call back   Best Call Back Number: .[phone]   Additional Information: Pt states she is needing a call to figure out what to do, states she is seeing things that re not there.     Called Yolanda--hallucinations started about 2-3 weeks ago  We added Vraylar 4/11--agreed to continue at that same dose for now and continue to monitor it.    Considered increasing Lamictal but has caused fatigue in past and focused more on hallucinations currently    She will update us later this week or early next week

## 2025-04-15 NOTE — ED PROVIDER NOTES
"SCRIBE #1 NOTE: I, Clarissa Campbell, am scribing for, and in the presence of, Otto Moreno MD. I have scribed the entire note.       History     Chief Complaint   Patient presents with    Seizures     Pt presents to ER for possible seizures. Pt has hx of seizures but was taken off meds recently due to MD stating "she was just doing a lot of shaking rather than actually having a true seizure". Pt is rocking back and forth on EMS stretcher during triage. Pt has psyc hx as well     Review of patient's allergies indicates:   Allergen Reactions    Codeine Itching    Hydromorphone Other (See Comments)     Can't wake up for long time if taken    Slow to wake up after surgery after receiving    Aleve [naproxen sodium]      Increases BP    Ibuprofen      Increases BP    Neuromuscular blockers, steroidal Hives     some    Pregabalin Itching    Versed [midazolam]     Latex, natural rubber Rash    Morphine Rash     itching    Norco [hydrocodone-acetaminophen] Itching, Rash and Hallucinations    Secobarbital sodium Rash     itching    Tylox [oxycodone-acetaminophen] Rash         History of Present Illness     HPI    4/15/2025, 2:14 AM  History obtained from the patient and medical records      History of Present Illness: Yolanda Betts is a 54 y.o. female patient with a PMHx of anemia, anxiety, bipolar 1 disorder, schizoaffective disorder, diabetes mellitus type 2, hypertension, myoclonus, and seizures who presents to the Emergency Department for evaluation of complications due to 2 reported seizures that occurred earlier today. The pt was evaluated on 04/11 and 04/12 for myoclonic jerking. Pt states she was aware she was seizing during the episodes. Pt reports her last seizure was in March. She reports compliance with Keppra medication. Pt's nephew called EMS but she lives alone. Pt states each seizure lasted for about 15 minutes. Associated sxs include dizziness and a headache. Patient denies any fever or chest pain. No " prior Tx specified.  No further complaints or concerns at this time.       Arrival mode: Ambulance Service    PCP: Rose Khan DNP        Past Medical History:  Past Medical History:   Diagnosis Date    Abnormal Pap smear of cervix     HPV genital warts    Anemia     Anxiety     Arthritis     Asthma 10/11/2016    Bipolar 1 disorder     Diabetes mellitus, type 2     Dyslipidemia associated with type 2 diabetes mellitus 05/13/2019    Dyspnea due to COVID-19 09/12/2024    General anesthetics causing adverse effect in therapeutic use     Genital warts     GERD (gastroesophageal reflux disease)     Herpes simplex virus (HSV) infection     Hyperlipidemia     Hypertension     Hypertension complicating diabetes 05/05/2019    Migraine with aura and without status migrainosus, not intractable 03/21/2016    Mild persistent asthma without complication 10/11/2016    Morbid obesity with body mass index (BMI) of 45.0 to 49.9 in adult 08/03/2017    Myoclonus     Obstructive sleep apnea     ALEN (obstructive sleep apnea)     2L per N/C q HS    Schizoaffective disorder, bipolar type 04/25/2019    Seasonal allergic rhinitis due to pollen 05/13/2019    Seizures     Type 2 diabetes mellitus with hyperglycemia, with long-term current use of insulin 12/21/2017    Type 2 diabetes mellitus with hyperglycemia, without long-term current use of insulin 12/21/2017       Past Surgical History:  Past Surgical History:   Procedure Laterality Date    abcess removed right back      ANGIOGRAM, CORONARY, WITH LEFT HEART CATHETERIZATION N/A 9/27/2024    Procedure: Angiogram, Coronary, with Left Heart Cath;  Surgeon: Jaimie Wills MD;  Location: [facility] CATH LAB;  Service: Cardiology;  Laterality: N/A;    BELT ABDOMINOPLASTY  1996    BREAST SURGERY Bilateral 1996    Reduction    CARPAL TUNNEL RELEASE Bilateral 12/18/2023    Procedure: RELEASE, CARPAL TUNNEL;  Surgeon: Neville Roth MD;  Location: [facility] OR;  Service:  Orthopedics;  Laterality: Bilateral;  bilateral carpal tunnel release     SECTION      X 2    COLONOSCOPY      COLONOSCOPY N/A 2018    Procedure: colonoscopy for iron deficiency anemia;  Surgeon: Frankie Sanchez MD;  Location: [facility] ENDO;  Service: Endoscopy;  Laterality: N/A;    COLONOSCOPY N/A 2018    Procedure: COLONOSCOPY;  Surgeon: Frankie Sanchez MD;  Location: [facility] ENDO;  Service: Endoscopy;  Laterality: N/A;    COLONOSCOPY N/A 3/10/2023    Procedure: COLONOSCOPY;  Surgeon: Lalita Montes De Oca MD;  Location: [facility] ENDO;  Service: Endoscopy;  Laterality: N/A;    COLONOSCOPY N/A 2024    Procedure: COLONOSCOPY;  Surgeon: Tara Og MD;  Location: [facility] ENDO;  Service: Endoscopy;  Laterality: N/A;    EPIDURAL STEROID INJECTION INTO CERVICAL SPINE N/A 2023    Procedure: C6/7 IL ROCAEL RN IV Sedation;  Surgeon: Otto Phillips MD;  Location: [facility] PAIN MGT;  Service: Pain Management;  Laterality: N/A;    EPIDURAL STEROID INJECTION INTO CERVICAL SPINE N/A 2024    Procedure: C5/6 IL ROCAEL;  Surgeon: Otto Phillips MD;  [facility];  Service: Pain Management;  Laterality: N/A;    ESOPHAGOGASTRODUODENOSCOPY N/A 3/10/2023    Procedure: EGD (ESOPHAGOGASTRODUODENOSCOPY);  Surgeon: Lalita Montes De Oca MD;  Location: [facility];  Service: Endoscopy;  Laterality: N/A;    HYSTERECTOMY      w/ BSO; hypermenorrhea    INJECTION OF ANESTHETIC AGENT AROUND MEDIAL BRANCH NERVES INNERVATING CERVICAL FACET JOINT Bilateral 2023    Procedure: Bilateral C5-7 MBB (diagnostic);  Surgeon: Otto Phillips MD;  Location: [facility] PAIN MGT;  Service: Pain Management;  Laterality: Bilateral;    MOUTH SURGERY  1996    OOPHORECTOMY      hyst/bso, hypermenorrhea    ROBOT-ASSISTED CHOLECYSTECTOMY USING DA DARI XI N/A 1/3/2020    Procedure: XI ROBOTIC CHOLECYSTECTOMY;  Surgeon: Damir Driscoll MD;  Location: [facility] OR;  Service: General;  Laterality: N/A;    TOTAL REDUCTION MAMMOPLASTY       "TUBAL LIGATION           Family History:  Family History   Problem Relation Name Age of Onset    Diabetes Mother      Hyperlipidemia Mother      Hypertension Mother      Asthma Mother      COPD Mother      Glaucoma Mother      Thyroid disease Mother      Anesthesia problems Mother          "almost had a cardiac arrest" , blood clots    Hypertension Father      Hyperlipidemia Father      Cancer Father          Brain, lung, liver, kidney    No Known Problems Sister      Hypertension Brother      Alcohol abuse Brother      Heart disease Maternal Grandmother      Hyperlipidemia Maternal Grandmother      Hypertension Maternal Grandmother      Cataracts Maternal Grandmother      Diabetes Maternal Grandmother      Heart disease Maternal Grandfather      Hyperlipidemia Maternal Grandfather      Hypertension Maternal Grandfather      Glaucoma Maternal Grandfather      Cancer Maternal Grandfather      Cataracts Maternal Grandfather      Macular degeneration Maternal Grandfather      Diabetes Maternal Grandfather      Heart disease Paternal Grandmother      Hyperlipidemia Paternal Grandmother      Hypertension Paternal Grandmother      Cataracts Paternal Grandmother      Heart disease Paternal Grandfather      Hyperlipidemia Paternal Grandfather      Hypertension Paternal Grandfather      Cataracts Paternal Grandfather      Breast cancer Maternal Cousin      Breast cancer Maternal Cousin      Breast cancer Maternal Cousin      Breast cancer Maternal Cousin         Social History:  Social History     Tobacco Use    Smoking status: Never     Passive exposure: Never    Smokeless tobacco: Never   Substance and Sexual Activity    Alcohol use: Not Currently     Comment: socially  No alcohol 72h prior to sx    Drug use: No    Sexual activity: Yes     Partners: Male     Birth control/protection: Surgical     Comment: john        Review of Systems     Review of Systems   Constitutional:  Negative for fever.   HENT:  Negative for sore " throat.    Respiratory:  Negative for shortness of breath.    Cardiovascular:  Negative for chest pain.   Gastrointestinal:  Negative for nausea.   Genitourinary:  Negative for dysuria.   Musculoskeletal:  Negative for back pain.   Skin:  Negative for rash.   Neurological:  Positive for dizziness, seizures and headaches. Negative for weakness.   Hematological:  Does not bruise/bleed easily.   All other systems reviewed and are negative.       Physical Exam     Initial Vitals [04/15/25 0131]   BP Pulse Resp Temp SpO2   (!) 150/82 100 19 -- 99 %      MAP       --          Physical Exam  Nursing Notes and Vital Signs Reviewed.  Constitutional: Patient is in no acute distress. Well-developed and well-nourished.  Head: Atraumatic. Normocephalic.  Eyes: PERRL. EOM intact. Conjunctivae are not pale. No scleral icterus.  ENT: Mucous membranes are moist. Oropharynx is clear and symmetric.    Neck: Supple. Full ROM. No lymphadenopathy.  Cardiovascular: Regular rate. Regular rhythm. No murmurs, rubs, or gallops. Distal pulses are 2+ and symmetric.  Pulmonary/Chest: No respiratory distress. Clear to auscultation bilaterally. No wheezing or rales.  Abdominal: Soft and non-distended.  There is no tenderness.  No rebound, guarding, or rigidity.  Genitourinary: No CVA tenderness.  Musculoskeletal: Moves all extremities. No obvious deformities. No edema.  Skin: Warm and dry.  Neurological:  Alert, awake, and appropriate.  Normal speech.  No acute focal neurological deficits are appreciated.  Psychiatric: Normal affect. Poor eye contact. Appropriate in content.     ED Course   Procedures  ED Vital Signs:  Vitals:    04/15/25 0131 04/15/25 0148 04/15/25 0200 04/15/25 0300   BP: (!) 150/82 129/78 128/72 125/71   Pulse: 100 93 89 84   Resp: 19 16  14   SpO2: 99% 95% 96% 97%       Abnormal Lab Results:  Labs Reviewed   COMPREHENSIVE METABOLIC PANEL - Abnormal       Result Value    Sodium 139      Potassium 4.6      Chloride 105      CO2  21 (*)     Glucose 123 (*)     BUN 9      Creatinine 0.7      Calcium 9.3      Protein Total 7.2      Albumin 3.5      Bilirubin Total 0.2      ALP 87      AST 28      ALT 28      Anion Gap 13      eGFR >60     CBC WITH DIFFERENTIAL - Abnormal    WBC 7.69      RBC 3.92 (*)     HGB 8.7 (*)     HCT 30.1 (*)     MCV 77 (*)     MCH 22.2 (*)     MCHC 28.9 (*)     RDW 18.5 (*)     Platelet Count 354      MPV 9.1 (*)     Nucleated RBC 0      Neut % 57.4      Lymph % 33.4      Mono % 6.4      Eos % 1.8      Basophil % 0.5      Imm Grans % 0.5      Neut # 4.41      Lymph # 2.57      Mono # 0.49      Eos # 0.14      Baso # 0.04      Imm Grans # 0.04     CK - Normal    CPK 65     CBC W/ AUTO DIFFERENTIAL    Narrative:     The following orders were created for panel order CBC auto differential.  Procedure                               Abnormality         Status                     ---------                               -----------         ------                     CBC with Differential[[id number]]       Abnormal            Final result                 Please view results for these tests on the individual orders.   LEVETIRACETAM  (KEPPRA) LEVEL   URINALYSIS, REFLEX TO URINE CULTURE        All Lab Results:  Results for orders placed or performed during the hospital encounter of 04/15/25   Comprehensive metabolic panel    Collection Time: 04/15/25  2:31 AM   Result Value Ref Range    Sodium 139 136 - 145 mmol/L    Potassium 4.6 3.5 - 5.1 mmol/L    Chloride 105 95 - 110 mmol/L    CO2 21 (L) 23 - 29 mmol/L    Glucose 123 (H) 70 - 110 mg/dL    BUN 9 6 - 20 mg/dL    Creatinine 0.7 0.5 - 1.4 mg/dL    Calcium 9.3 8.7 - 10.5 mg/dL    Protein Total 7.2 6.0 - 8.4 gm/dL    Albumin 3.5 3.5 - 5.2 g/dL    Bilirubin Total 0.2 0.1 - 1.0 mg/dL    ALP 87 40 - 150 unit/L    AST 28 11 - 45 unit/L    ALT 28 10 - 44 unit/L    Anion Gap 13 8 - 16 mmol/L    eGFR >60 >60 mL/min/1.73/m2   CPK    Collection Time: 04/15/25  2:31 AM   Result Value Ref  Range    CPK 65 20 - 180 U/L   CBC with Differential    Collection Time: 04/15/25  2:31 AM   Result Value Ref Range    WBC 7.69 3.90 - 12.70 K/uL    RBC 3.92 (L) 4.00 - 5.40 M/uL    HGB 8.7 (L) 12.0 - 16.0 gm/dL    HCT 30.1 (L) 37.0 - 48.5 %    MCV 77 (L) 82 - 98 fL    MCH 22.2 (L) 27.0 - 31.0 pg    MCHC 28.9 (L) 32.0 - 36.0 g/dL    RDW 18.5 (H) 11.5 - 14.5 %    Platelet Count 354 150 - 450 K/uL    MPV 9.1 (L) 9.2 - 12.9 fL    Nucleated RBC 0 <=0 /100 WBC    Neut % 57.4 38 - 73 %    Lymph % 33.4 18 - 48 %    Mono % 6.4 4 - 15 %    Eos % 1.8 <=8 %    Basophil % 0.5 <=1.9 %    Imm Grans % 0.5 0.0 - 0.5 %    Neut # 4.41 1.8 - 7.7 K/uL    Lymph # 2.57 1 - 4.8 K/uL    Mono # 0.49 0.3 - 1 K/uL    Eos # 0.14 <=0.5 K/uL    Baso # 0.04 <=0.2 K/uL    Imm Grans # 0.04 0.00 - 0.04 K/uL     *Note: Due to a large number of results and/or encounters for the requested time period, some results have not been displayed. A complete set of results can be found in Results Review.       Imaging Results:  Imaging Results              CT Head Without Contrast (In process)                      No EKG was ordered.           The Emergency Provider reviewed the vital signs and test results, which are outlined above.     ED Discussion       ED Course as of 04/15/25 0554   Tue Apr 15, 2025   0553 Prior to discharge, patient was at her neruo baseline, not post ictal.  [BA]      ED Course User Index  [BA] Otto Moreno MD     Medical Decision Making  Amount and/or Complexity of Data Reviewed  External Data Reviewed: notes.     Details: Previous ED assessments on 04/11 and 04/12 for HPI  Labs: ordered. Decision-making details documented in ED Course.  Radiology: ordered. Decision-making details documented in ED Course.     Details: Type of Interpretation: Outside Written Report  STAT Radiology Procedure Done:  CT head without intravenous contrast  Interpretation:  No acute findings.  Radiologist:  Te Cardenas MD  04/15/2025   02:54  ECG/medicine tests: ordered and independent interpretation performed. Decision-making details documented in ED Course.  Discussion of management or test interpretation with external provider(s): 3:20 AM: Reassessed pt at this time. Discussed with patient and/or family/caretaker all pertinent ED information and results. Discussed pt dx and plan of tx. Gave the patient all f/u and return to the ED instructions. All questions and concerns were addressed at this time. Patient and/or family/caretaker expresses understanding of information and instructions, and is comfortable with plan to discharge. Pt is stable for discharge.     I discussed with patient and/or family/caretaker that evaluation in the ED does not suggest any emergent or life threatening medical conditions requiring immediate intervention beyond what was provided in the ED, and I believe patient is safe for discharge.  Regardless, an unremarkable evaluation in the ED does not preclude the development or presence of a serious of life threatening condition. As such, I instructed that the patient is to return immediately for any worsening or change in current symptoms.      Risk  Risk Details: Differential diagnosis for seizure includes but is not limited to psychogenic nonepileptic seizure, epilepsy, panic attack, syncope, TIA, migraine with aura, transient global amnesia, narcolepsy with cataplexy, paroxysmal movement disorders, electrolyte derangement (sodium).                  ED Medication(s):  Medications - No data to display    Discharge Medication List as of 4/15/2025  3:12 AM           Follow-up Information       Alden Cervantes MD. Call in 1 day.    Specialty: Neurology  Why: For re-evaluation and further treatment  Contact information:  [address]  [place] [address]  [phone]               [facility] - Emergency Dept.. Go today.    Specialty: Emergency Medicine  Why: If symptoms worsen, For re-evaluation and further treatment,  As needed  Contact information:  [address] [facility] [address] [phone]                               Scribe Attestation:   Scribe #1: I performed the above scribed service and the documentation accurately describes the services I performed. I attest to the accuracy of the note.     Attending:   Physician Attestation Statement for Scribe #1: I, Otto Moreno MD, personally performed the services described in this documentation, as scribed by Clarissa Campbell, in my presence, and it is both accurate and complete.           Clinical Impression       ICD-10-CM ICD-9-CM   1. Seizure-like activity  R56.9 780.39       Disposition:   Disposition: Discharged  Condition: Stable       Otto Moreno MD  04/15/25 0554

## 2025-04-17 ENCOUNTER — OFFICE VISIT (OUTPATIENT)
Dept: RHEUMATOLOGY | Facility: CLINIC | Age: 55
End: 2025-04-17
Payer: MEDICAID

## 2025-04-17 ENCOUNTER — APPOINTMENT (OUTPATIENT)
Dept: LAB | Facility: HOSPITAL | Age: 55
End: 2025-04-17
Attending: INTERNAL MEDICINE
Payer: MEDICAID

## 2025-04-17 ENCOUNTER — TELEPHONE (OUTPATIENT)
Dept: PODIATRY | Facility: CLINIC | Age: 55
End: 2025-04-17
Payer: MEDICAID

## 2025-04-17 VITALS
BODY MASS INDEX: 44.19 KG/M2 | HEIGHT: 56 IN | DIASTOLIC BLOOD PRESSURE: 84 MMHG | HEART RATE: 84 BPM | WEIGHT: 196.44 LBS | SYSTOLIC BLOOD PRESSURE: 120 MMHG

## 2025-04-17 DIAGNOSIS — D84.821 DRUG-INDUCED IMMUNODEFICIENCY: ICD-10-CM

## 2025-04-17 DIAGNOSIS — D50.8 ACQUIRED IRON DEFICIENCY ANEMIA DUE TO DECREASED ABSORPTION: ICD-10-CM

## 2025-04-17 DIAGNOSIS — Z79.899 DRUG-INDUCED IMMUNODEFICIENCY: ICD-10-CM

## 2025-04-17 DIAGNOSIS — M79.7 FIBROMYALGIA: ICD-10-CM

## 2025-04-17 DIAGNOSIS — M45.9 ANKYLOSING SPONDYLITIS, UNSPECIFIED SITE OF SPINE: Primary | ICD-10-CM

## 2025-04-17 DIAGNOSIS — M53.3 SCLEROSIS OF SACROILIAC JOINT: ICD-10-CM

## 2025-04-17 DIAGNOSIS — Z51.81 ENCOUNTER FOR MEDICATION MONITORING: ICD-10-CM

## 2025-04-17 DIAGNOSIS — Z79.899 IMMUNOCOMPROMISED STATE DUE TO DRUG THERAPY: ICD-10-CM

## 2025-04-17 DIAGNOSIS — R79.82 ELEVATED C-REACTIVE PROTEIN (CRP): ICD-10-CM

## 2025-04-17 DIAGNOSIS — J45.40 MODERATE PERSISTENT ASTHMA WITHOUT COMPLICATION: ICD-10-CM

## 2025-04-17 DIAGNOSIS — D84.821 IMMUNOCOMPROMISED STATE DUE TO DRUG THERAPY: ICD-10-CM

## 2025-04-17 PROCEDURE — 3074F SYST BP LT 130 MM HG: CPT | Mod: CPTII,,, | Performed by: INTERNAL MEDICINE

## 2025-04-17 PROCEDURE — 99215 OFFICE O/P EST HI 40 MIN: CPT | Mod: S$PBB,,, | Performed by: INTERNAL MEDICINE

## 2025-04-17 PROCEDURE — 3008F BODY MASS INDEX DOCD: CPT | Mod: CPTII,,, | Performed by: INTERNAL MEDICINE

## 2025-04-17 PROCEDURE — 3052F HG A1C>EQUAL 8.0%<EQUAL 9.0%: CPT | Mod: CPTII,,, | Performed by: INTERNAL MEDICINE

## 2025-04-17 PROCEDURE — 99999 PR PBB SHADOW E&M-EST. PATIENT-LVL V: CPT | Mod: PBBFAC,,, | Performed by: INTERNAL MEDICINE

## 2025-04-17 PROCEDURE — 99215 OFFICE O/P EST HI 40 MIN: CPT | Mod: PBBFAC | Performed by: INTERNAL MEDICINE

## 2025-04-17 PROCEDURE — 1159F MED LIST DOCD IN RCRD: CPT | Mod: CPTII,,, | Performed by: INTERNAL MEDICINE

## 2025-04-17 PROCEDURE — G2211 COMPLEX E/M VISIT ADD ON: HCPCS | Mod: S$PBB,,, | Performed by: INTERNAL MEDICINE

## 2025-04-17 PROCEDURE — 1160F RVW MEDS BY RX/DR IN RCRD: CPT | Mod: CPTII,,, | Performed by: INTERNAL MEDICINE

## 2025-04-17 PROCEDURE — 3072F LOW RISK FOR RETINOPATHY: CPT | Mod: CPTII,,, | Performed by: INTERNAL MEDICINE

## 2025-04-17 PROCEDURE — 3079F DIAST BP 80-89 MM HG: CPT | Mod: CPTII,,, | Performed by: INTERNAL MEDICINE

## 2025-04-17 RX ORDER — ALBUTEROL SULFATE 0.83 MG/ML
SOLUTION RESPIRATORY (INHALATION)
Qty: 75 ML | Refills: 11 | Status: SHIPPED | OUTPATIENT
Start: 2025-04-17

## 2025-04-17 NOTE — PROGRESS NOTES
RHEUMATOLOGY CLINIC ESTABLISHED PATIENT VISIT    Chief complaints, HPI, ROS, EXAM, Assessment & Plans:-    Yolanda Betts is a 54 y.o. pleasant female comes in today for follow-up visit.  She has been seen by my associates for ankylosing spondylitis and fibromyalgia.  She is on Cosentyx 300 mg every 2 weeks.  Continues to take Cymbalta and Flexeril for fibromyalgia.  Persistent balance issues.  Recently had flare of seizures and dose of Keppra was increased.  Complains of severe worsening fatigue..  Significant improvement of inflammatory back pain on Cosentyx-50% improvement.   No psoriasis.  Rheumatologic review of systems negative.  Physical exam shows no significant synovitis or dactylitis.  Multiple soft tissue tender points.  18/18 tender points.  Tenderness of sacroiliac joints.    Reviewed all available old and outside pertinent medical records.    All lab results personally reviewed and interpreted by me.    1. Ankylosing spondylitis, unspecified site of spine    2. Sclerosis of sacroiliac joint    3. Drug-induced immunodeficiency    4. Encounter for medication monitoring    5. Acquired iron deficiency anemia due to decreased absorption    6. Fibromyalgia        Problem List Items Addressed This Visit       Fibromyalgia (Chronic)    Acquired iron deficiency anemia due to decreased absorption    Ankylosing spondylitis - Primary    Relevant Orders    C-Reactive Protein    Sedimentation rate    Drug-induced immunodeficiency    Encounter for medication monitoring    Sclerosis of sacroiliac joint    Relevant Orders    C-Reactive Protein    Sedimentation rate      Latest Reference Range & Units 04/15/25 02:31   WBC 3.90 - 12.70 K/uL 7.69   RBC 4.00 - 5.40 M/uL 3.92 (L)   Hemoglobin 12.0 - 16.0 gm/dL 8.7 (L)   Hematocrit 37.0 - 48.5 % 30.1 (L)   MCV 82 - 98 fL 77 (L)   MCH 27.0 - 31.0 pg 22.2 (L)   MCHC 32.0 - 36.0 g/dL 28.9 (L)   RDW 11.5 - 14.5 % 18.5 (H)   Platelet Count 150 - 450 K/uL 354   MPV 9.2 - 12.9  fL 9.1 (L)   Neut % 38 - 73 % 57.4   Lymph % 18 - 48 % 33.4   Mono % 4 - 15 % 6.4   Eos % <=8 % 1.8   Basophil % <=1.9 % 0.5   Immature Granulocytes 0.0 - 0.5 % 0.5   Gran # (ANC) 1.8 - 7.7 K/uL 4.41   Lymph # 1 - 4.8 K/uL 2.57   Mono # 0.3 - 1 K/uL 0.49   Eos # <=0.5 K/uL 0.14   Baso # <=0.2 K/uL 0.04   Immature Grans (Abs) 0.00 - 0.04 K/uL 0.04   nRBC <=0 /100 WBC 0   Sodium 136 - 145 mmol/L 139   Potassium 3.5 - 5.1 mmol/L 4.6   Chloride 95 - 110 mmol/L 105   CO2 23 - 29 mmol/L 21 (L)   Anion Gap 8 - 16 mmol/L 13   BUN 6 - 20 mg/dL 9   Creatinine 0.5 - 1.4 mg/dL 0.7   eGFR >60 mL/min/1.73/m2 >60   Glucose 70 - 110 mg/dL 123 (H)   Calcium 8.7 - 10.5 mg/dL 9.3   ALP 40 - 150 unit/L 87   PROTEIN TOTAL 6.0 - 8.4 gm/dL 7.2   Albumin 3.5 - 5.2 g/dL 3.5   BILIRUBIN TOTAL 0.1 - 1.0 mg/dL 0.2   AST 11 - 45 unit/L 28   ALT 10 - 44 unit/L 28   CPK 20 - 180 U/L 65   (L): Data is abnormally low  (H): Data is abnormally high    Severe ankylosing spondylitis currently treated with Cosentyx.  50% improvement on Cosentyx..  Continue high-dose Cosentyx 300 mg every 14 days.  Check inflammatory markers today.  If significantly elevated consider alternative IL 17 agents.  Continue Cymbalta and Flexeril for fibromyalgia symptoms.  Intolerance to Lyrica and failed gabapentin.  Failed low-dose naltrexone.  Oral thrush unrelated to Cosentyx.  Likely secondary to uncontrolled diabetes.  Continue follow-up with primary care provider.  Severe iron-deficiency anemia with extreme fatigue and other neurological problems.  Scheduled to consult with hematologist to consider iron infusion therapy.  Drug therapy requiring intensive monitoring for toxicity  High Risk Medication Monitoring encounter  No current medication related issues, no evidence of toxicity  Appropriate labs ordered for toxicity monitoring    Drug induced immunodeficiency due to use of immunosuppressive drugs. Monitor carefully for infections. Advised to get immediate medical  care if any infection. Also advised strict adherence to age appropriate vaccinations and cancer screenings with PCP.  Hold Cosentyx if any infection.  Safety labs every visit.      # Follow up in about 6 months (around 10/17/2025).    Chronic comorbid conditions affecting medical decision making today:    Past Medical History:   Diagnosis Date    Abnormal Pap smear of cervix     HPV genital warts    Anemia     Anxiety     Arthritis     Asthma 10/11/2016    Bipolar 1 disorder     Diabetes mellitus, type 2     Dyslipidemia associated with type 2 diabetes mellitus 05/13/2019    Dyspnea due to COVID-19 09/12/2024    General anesthetics causing adverse effect in therapeutic use     Genital warts     GERD (gastroesophageal reflux disease)     Herpes simplex virus (HSV) infection     Hyperlipidemia     Hypertension     Hypertension complicating diabetes 05/05/2019    Migraine with aura and without status migrainosus, not intractable 03/21/2016    Mild persistent asthma without complication 10/11/2016    Morbid obesity with body mass index (BMI) of 45.0 to 49.9 in adult 08/03/2017    Myoclonus     Obstructive sleep apnea     ALEN (obstructive sleep apnea)     2L per N/C q HS    Schizoaffective disorder, bipolar type 04/25/2019    Seasonal allergic rhinitis due to pollen 05/13/2019    Seizures     Type 2 diabetes mellitus with hyperglycemia, with long-term current use of insulin 12/21/2017    Type 2 diabetes mellitus with hyperglycemia, without long-term current use of insulin 12/21/2017       Past Surgical History:   Procedure Laterality Date    abcess removed right back      ANGIOGRAM, CORONARY, WITH LEFT HEART CATHETERIZATION N/A 9/27/2024    Procedure: Angiogram, Coronary, with Left Heart Cath;  Surgeon: Jaimie Wills MD;  Location: [facility] CATH LAB;  Service: Cardiology;  Laterality: N/A;    BELT ABDOMINOPLASTY  1996    BREAST SURGERY Bilateral 1996    Reduction    CARPAL TUNNEL RELEASE Bilateral 12/18/2023     Procedure: RELEASE, CARPAL TUNNEL;  Surgeon: Neville Roth MD;  Location: [facility] OR;  Service: Orthopedics;  Laterality: Bilateral;  bilateral carpal tunnel release     SECTION      X 2    COLONOSCOPY      COLONOSCOPY N/A 2018    Procedure: colonoscopy for iron deficiency anemia;  Surgeon: Frankie Sanchez MD;  Location: [facility];  Service: Endoscopy;  Laterality: N/A;    COLONOSCOPY N/A 2018    Procedure: COLONOSCOPY;  Surgeon: Frankie Sanchez MD;  Location: [facility];  Service: Endoscopy;  Laterality: N/A;    COLONOSCOPY N/A 3/10/2023    Procedure: COLONOSCOPY;  Surgeon: Lalita Montes De Oca MD;  Location: [facility];  Service: Endoscopy;  Laterality: N/A;    COLONOSCOPY N/A 2024    Procedure: COLONOSCOPY;  Surgeon: Tara Og MD;  Location: [facility];  Service: Endoscopy;  Laterality: N/A;    EPIDURAL STEROID INJECTION INTO CERVICAL SPINE N/A 2023    Procedure: C6/7 IL ROCAEL RN IV Sedation;  Surgeon: Otto Phillips MD;  Location: [facility] PAIN MGT;  Service: Pain Management;  Laterality: N/A;    EPIDURAL STEROID INJECTION INTO CERVICAL SPINE N/A 2024    Procedure: C5/6 IL ROCAEL;  Surgeon: Otto Phillips MD;  Location: [facility] PAIN MGT;  Service: Pain Management;  Laterality: N/A;    ESOPHAGOGASTRODUODENOSCOPY N/A 3/10/2023    Procedure: EGD (ESOPHAGOGASTRODUODENOSCOPY);  Surgeon: Lalita Montes De Oca MD;  Location: [facility];  Service: Endoscopy;  Laterality: N/A;    HYSTERECTOMY      w/ BSO; hypermenorrhea    INJECTION OF ANESTHETIC AGENT AROUND MEDIAL BRANCH NERVES INNERVATING CERVICAL FACET JOINT Bilateral 2023    Procedure: Bilateral C5-7 MBB (diagnostic);  Surgeon: Otto Phillips MD;  Location: [facility] PAIN MGT;  Service: Pain Management;  Laterality: Bilateral;    MOUTH SURGERY  1996    OOPHORECTOMY      hyst/bso, hypermenorrhea    ROBOT-ASSISTED CHOLECYSTECTOMY USING DA DARI XI N/A 1/3/2020    Procedure: XI ROBOTIC CHOLECYSTECTOMY;  Surgeon: Damir  "MD Americo;  Location: [facility] OR;  Service: General;  Laterality: N/A;    TOTAL REDUCTION MAMMOPLASTY      TUBAL LIGATION          Social History     Tobacco Use    Smoking status: Never     Passive exposure: Never    Smokeless tobacco: Never   Substance Use Topics    Alcohol use: Not Currently     Comment: socially  No alcohol 72h prior to sx    Drug use: No       Family History   Problem Relation Name Age of Onset    Diabetes Mother      Hyperlipidemia Mother      Hypertension Mother      Asthma Mother      COPD Mother      Glaucoma Mother      Thyroid disease Mother      Anesthesia problems Mother          "almost had a cardiac arrest" , blood clots    Hypertension Father      Hyperlipidemia Father      Cancer Father          Brain, lung, liver, kidney    No Known Problems Sister      Hypertension Brother      Alcohol abuse Brother      Heart disease Maternal Grandmother      Hyperlipidemia Maternal Grandmother      Hypertension Maternal Grandmother      Cataracts Maternal Grandmother      Diabetes Maternal Grandmother      Heart disease Maternal Grandfather      Hyperlipidemia Maternal Grandfather      Hypertension Maternal Grandfather      Glaucoma Maternal Grandfather      Cancer Maternal Grandfather      Cataracts Maternal Grandfather      Macular degeneration Maternal Grandfather      Diabetes Maternal Grandfather      Heart disease Paternal Grandmother      Hyperlipidemia Paternal Grandmother      Hypertension Paternal Grandmother      Cataracts Paternal Grandmother      Heart disease Paternal Grandfather      Hyperlipidemia Paternal Grandfather      Hypertension Paternal Grandfather      Cataracts Paternal Grandfather      Breast cancer Maternal Cousin      Breast cancer Maternal Cousin      Breast cancer Maternal Cousin      Breast cancer Maternal Cousin         Review of patient's allergies indicates:   Allergen Reactions    Codeine Itching    Hydromorphone Other (See Comments)     Can't wake up for " "long time if taken    Slow to wake up after surgery after receiving    Aleve [naproxen sodium]      Increases BP    Ibuprofen      Increases BP    Neuromuscular blockers, steroidal Hives     some    Pregabalin Itching    Versed [midazolam]     Latex, natural rubber Rash    Morphine Rash     itching    Norco [hydrocodone-acetaminophen] Itching, Rash and Hallucinations    Secobarbital sodium Rash     itching    Tylox [oxycodone-acetaminophen] Rash       Medication List with Changes/Refills   Current Medications    ACETAMINOPHEN (TYLENOL) 325 MG TABLET    Take 650 mg by mouth every 6 (six) hours as needed.    ALBUTEROL (PROVENTIL/VENTOLIN HFA) 90 MCG/ACTUATION INHALER    Inhale 2 puffs into the lungs every 6 (six) hours as needed for Wheezing.    ALPRAZOLAM (XANAX) 1 MG TABLET    Take 1 tablet (1 mg total) by mouth 2 (two) times daily as needed for Anxiety.    AMLODIPINE (NORVASC) 10 MG TABLET    Take 1 tablet (10 mg total) by mouth once daily.    ATORVASTATIN (LIPITOR) 20 MG TABLET    Take 1 tablet (20 mg total) by mouth every evening    BD INSULIN SYRINGE ULTRA-FINE 1/2 ML 30 GAUGE X 1/2" SYRG        BLOOD-GLUCOSE SENSOR (DEXCOM G6 SENSOR) NHI    change sensor every 10 days.    BLOOD-GLUCOSE TRANSMITTER (DEXCOM G6 TRANSMITTER) NHI    1 each by Misc.(Non-Drug; Combo Route) route every 3 (three) months.    CARIPRAZINE (VRAYLAR) 1.5 MG CAP    Take 1 capsule (1.5 mg total) by mouth once daily.    CHOLECALCIFEROL, VITAMIN D3, 1,250 MCG (50,000 UNIT) TAB    Take 1 capsule by mouth every 7 days.    CYCLOBENZAPRINE (FLEXERIL) 10 MG TABLET    Take 1 tablet (10 mg total) by mouth 3 (three) times daily as needed (Pain).    DULOXETINE (CYMBALTA) 60 MG CAPSULE    Take 1 capsule by mouth 2 times daily    ERGOCALCIFEROL (ERGOCALCIFEROL) 50,000 UNIT CAP    Take 1 capsule (50,000 Units total) by mouth every 7 days.    ESTRADIOL (ESTRACE) 0.01 % (0.1 MG/GRAM) VAGINAL CREAM    Place 1 g vaginally twice a week.    FENOFIBRATE (TRICOR) " "145 MG TABLET    Take 1 tablet by mouth in the morning.    FLUTICASONE PROPIONATE (FLONASE) 50 MCG/ACTUATION NASAL SPRAY    Take 1 spray by Each Nostril route in the morning.    FOLIC ACID (FOLVITE) 1 MG TABLET    Take 1 tablet by mouth every day in the morning.    FUROSEMIDE (LASIX) 20 MG TABLET    Take 1 tablet (20 mg total) by mouth once daily.    GLUCAGON (BAQSIMI) 3 MG/ACTUATION SPRY    SPRAY 1 SPRAY IN NOSTRIL AS NEEDED FOR EMERGENCY. MAY REPEAT 1 TIME AFTER 15 MINUTES    INSULIN ASPART U-100 (NOVOLOG FLEXPEN U-100 INSULIN) 100 UNIT/ML (3 ML) INPN PEN    Inject 9 Units into the skin 3 (three) times daily before meals. Use sliding scale for small medium and large meals    INSULIN ASPART U-100 (NOVOLOG U-100 INSULIN ASPART) 100 UNIT/ML INJECTION    To use via insulin pump. Up to 200 units every 2 days    INSULIN GLARGINE U-100, LANTUS, (LANTUS SOLOSTAR U-100 INSULIN) 100 UNIT/ML (3 ML) INPN PEN    Inject 16 Units into the skin once daily. Use in the event of pump failure    INSULIN PUMP CART,AUTO,BT,G6/7 (OMNIPOD 5 G6-G7 PODS, GEN 5,) CRTG    1 each by Misc.(Non-Drug; Combo Route) route every 48 hours.    INSULIN SYRINGE-NEEDLE U-100 0.3 ML 30 GAUGE X 5/16" SYRG    1 each by Misc.(Non-Drug; Combo Route) route Every 3 (three) days.    LAMOTRIGINE (LAMICTAL) 100 MG TABLET    Take 1 tablet (100 mg total) by mouth 2 (two) times daily.    LEVETIRACETAM (KEPPRA) 500 MG TAB    Take 1.5 tablets (750 mg total) by mouth 2 (two) times daily.    LEVOCETIRIZINE (XYZAL) 5 MG TABLET    Take 1 tablet (5 mg total) by mouth every evening.    LIDOCAINE (LIDODERM) 5 %    Place 2 patches onto the skin every 12 (twelve) hours as needed (pain). Remove & Discard patch within 12 hours or as directed by MD    LIFITEGRAST (XIIDRA) 5 % DPET    Place 1 drop into both eyes 2 (two) times daily.    LINACLOTIDE (LINZESS) 290 MCG CAP CAPSULE    Take 1 capsule (290 mcg total) by mouth before breakfast.    MAGNESIUM OXIDE (MAG-OX) 400 MG (241.3 MG " "MAGNESIUM) TABLET    Take 1 tablet (400 mg total) by mouth once daily.    MELATONIN (MELATIN) 3 MG TABLET    Take 2 tablets (6 mg total) by mouth nightly.    METOPROLOL SUCCINATE (TOPROL-XL) 50 MG 24 HR TABLET    Take 1 tablet (50 mg total) by mouth every morning.    NYSTATIN (MYCOSTATIN) 100,000 UNIT/ML SUSPENSION    Take 4 mLs (400,000 Units total) by mouth 4 (four) times daily. for 10 days    OMEPRAZOLE (PRILOSEC) 40 MG CAPSULE    Take 1 capsule (40 mg total) by mouth once daily.    ONDANSETRON (ZOFRAN) 4 MG TABLET    Take 4 mg by mouth every 6 (six) hours.    OZEMPIC 2 MG/DOSE (8 MG/3 ML) PNIJ    Inject 2 mg into the skin every 7 days.    PEN NEEDLE, DIABETIC (BD ULTRA-FINE MINI PEN NEEDLE) 31 GAUGE X 3/16" NDLE    Use 1 a day in event of pump failure    POLYETHYLENE GLYCOL (GLYCOLAX) 17 GRAM/DOSE POWDER    Take 17 g by mouth 2 (two) times daily.    SECUKINUMAB (COSENTYX PEN, 2 PENS,) 150 MG/ML PNIJ    Inject 300 mg into the skin every 14 (fourteen) days.    SYMBICORT 160-4.5 MCG/ACTUATION HFAA    Inhale 2 puffs into the lungs in the morning and 2 puffs before bedtime. 2 puffs every 12 hours    TOPIRAMATE (TOPAMAX) 25 MG TABLET    Take 1 tablet (25 mg total) by mouth every evening.    TRIAMCINOLONE ACETONIDE 0.1% (KENALOG) 0.1 % PASTE    Apply coating 2 times daily    VITAMIN D3-VITAMIN K2 1,250-200 MCG CAP    Take by mouth.    ZOLPIDEM (AMBIEN CR) 6.25 MG CR TABLET    Take 1 tablet (6.25 mg total) by mouth nightly as needed for Insomnia.   Changed and/or Refilled Medications    Modified Medication Previous Medication    ALBUTEROL (PROVENTIL) 2.5 MG /3 ML (0.083 %) NEBULIZER SOLUTION albuterol (PROVENTIL) 2.5 mg /3 mL (0.083 %) nebulizer solution       Take 2.5 mg by nebulization every 4 (four) hours as needed. Times a day    Take 2.5 mg by nebulization every 4 (four) hours as needed. Times a day         Disclaimer: This note was prepared using voice recognition system and is likely to have sound alike errors " and is not proofread.  Please message me with any questions.

## 2025-04-17 NOTE — TELEPHONE ENCOUNTER
I spoke to pt to schedule her a sooner appt with Dr. Chavez.      ----- Message from Calista sent at 4/17/2025 11:33 AM CDT -----  Name of Who is Calling: Pt   What is the request in detail: Pt would like a call back in regards to toe pain. Pt has stated on right big toe, when pressure is put on the toe that it is giving a lot of pain. Please advise thank you   Can the clinic reply by [facility]:no  What Number to Call Back if not in [facility]:Telephone Information:Mobile          [phone]

## 2025-04-18 LAB — W LEVETIRACETAM: 22.9 UG/ML

## 2025-04-20 ENCOUNTER — RESULTS FOLLOW-UP (OUTPATIENT)
Dept: RHEUMATOLOGY | Facility: CLINIC | Age: 55
End: 2025-04-20
Payer: MEDICAID

## 2025-04-20 DIAGNOSIS — M45.9 ANKYLOSING SPONDYLITIS, UNSPECIFIED SITE OF SPINE: Primary | ICD-10-CM

## 2025-04-20 DIAGNOSIS — R79.82 ELEVATED C-REACTIVE PROTEIN (CRP): ICD-10-CM

## 2025-04-20 PROCEDURE — 99999 PR PBB SHADOW E&M-EST. PATIENT-LVL I: CPT | Mod: PBBFAC,,, | Performed by: INTERNAL MEDICINE

## 2025-04-23 ENCOUNTER — TELEPHONE (OUTPATIENT)
Dept: PSYCHIATRY | Facility: CLINIC | Age: 55
End: 2025-04-23
Payer: MEDICAID

## 2025-04-23 ENCOUNTER — NURSE TRIAGE (OUTPATIENT)
Dept: ADMINISTRATIVE | Facility: CLINIC | Age: 55
End: 2025-04-23
Payer: MEDICAID

## 2025-04-23 DIAGNOSIS — F25.0 SCHIZOAFFECTIVE DISORDER, BIPOLAR TYPE: Primary | ICD-10-CM

## 2025-04-23 RX ORDER — ZIPRASIDONE HYDROCHLORIDE 40 MG/1
40 CAPSULE ORAL 2 TIMES DAILY
Qty: 60 CAPSULE | Refills: 1 | Status: SHIPPED | OUTPATIENT
Start: 2025-04-23 | End: 2025-06-22

## 2025-04-23 NOTE — TELEPHONE ENCOUNTER
Caller states that she see bugs in her bed. Caller is unsure if she is having visual hallucinations. Caller denies S/I H/I or AH. Caller states family member that is currently home with her cannot see the bugs in her bed.  Pt advised per protocol and verbalized understanding.       Reason for Disposition   Nursing judgment    Additional Information   Negative: Patient attempted suicide   Negative: Patient is threatening suicide now   Negative: Violent behavior, or threatening to physically hurt or kill someone   Negative: [1] Patient is very confused (disoriented, slurred speech) AND [2] no other adult (e.g., friend or family member) available   Negative: [1] Difficult to awaken or acting confused (disoriented, slurred speech) AND [2] new-onset   Negative: Drug overdose suspected   Negative: Sounds like a life-threatening emergency to the triager   Negative: Nursing judgment   Negative: Information only call; adult is not ill or injured    Protocols used: Bipolar Disorder (Manic Depression)-A-, No Guideline or Reference [id number]

## 2025-04-23 NOTE — TELEPHONE ENCOUNTER
"Present  Daughter--Romina; Lucretia (helping with ins)  Confusion, hyperfixation on vermin or pests    Called Yolanda to discuss medicines--bugs again; recommended IOP; she is resistant;     Reviewed that she was seeing bugs even prior to restarting Vraylar; she is adamant that she does not want Vraylar; reviewed prior atypicals; discussed possible TD; she reported having a shaking episode this past weekend--precursor to seizures--advised to follow-up with neurology    Daughter said she goes off on a tangent too; gets confused; forgets things; they want her to move closer to them    Today-slurring some words; forgetting what she is saying; sounds "out of it" like drugged; believe side effect of Xanax--again discussed taking lower dose and will 1/2 tab twice daily; will add Geodon 40 mg bid; scheduled for 4/28 virtual; daughter will be able to attend the 5/22 in office visit with her        Prior medicines: Prozac, Wellbutrin, Paxil, Lamictal, Lexapro, Celexa, Cymbalta, Remeron, Trileptal (?), Abilify, Seroquel (raised blood sugars), Risperdal, Latuda, Vraylar, Caplyta (seizures), Restoril, Ambien (groggy), Ambien CR, trazodone, doxepin, Xanax, clonazepam      "

## 2025-04-23 NOTE — TELEPHONE ENCOUNTER
----- Message from Marcella sent at 4/23/2025 10:29 AM CDT -----  Contact: [phone] .[email or web address] Patient is calling for Medical Advice regarding:states she called yesterday How long has patient had these symptoms:Pharmacy name and phone#:Patient wants a call back or thru [facility]:call back Comments:She states she is needing to speak to the office she is experiencing things that are not there Please advise patient replies from provider may take up to 48 hours.

## 2025-04-23 NOTE — TELEPHONE ENCOUNTER
Called pt pt state that she has been seeing dead bugs taking baths 6 times a day because she feel like something is crawling on her  and she is feeling manic. Her daughters was on the phone with her they state that she has gotten the  to spay and she has been calling them in the middle of the night about the bugs. Pt state that she has been on the medication Vraylar for about a week and this has been going on every since she started back taking the medication.. Pt was asked if she feel like hurting herself or others or do she feel like the feeling is to overawing for her to handle.. pt stated Im not feeling like harming myself or no one else but I can't handle feeling like something is crawling on me and seeing things.. pt state that she has not taking the medication today and don't want to take it. Pt was advised to stop taking the medication is she feel like it is the problem.. Pt state that she really just need to talk to the provider.. pt was advised that a message would be sent to the provider to make her aware of the suturation and if before she talk to the provider and feel like its to much.. To go to the ER.. pt was scheduled for a virtual appointment 4/28 8:30 an with Dr. Mariam Young.. Pt  Daughters stated that this is a good time for them to be able to assisted the pt..

## 2025-04-26 ENCOUNTER — PATIENT MESSAGE (OUTPATIENT)
Dept: PSYCHIATRY | Facility: CLINIC | Age: 55
End: 2025-04-26
Payer: MEDICAID

## 2025-04-26 ENCOUNTER — NURSE TRIAGE (OUTPATIENT)
Dept: ADMINISTRATIVE | Facility: CLINIC | Age: 55
End: 2025-04-26
Payer: MEDICAID

## 2025-04-26 ENCOUNTER — OCHSNER VIRTUAL EMERGENCY DEPARTMENT (OUTPATIENT)
Facility: CLINIC | Age: 55
End: 2025-04-26
Payer: MEDICAID

## 2025-04-26 ENCOUNTER — HOSPITAL ENCOUNTER (EMERGENCY)
Facility: HOSPITAL | Age: 55
Discharge: HOME | End: 2025-04-26
Attending: FAMILY MEDICINE
Payer: MEDICAID

## 2025-04-26 ENCOUNTER — PATIENT OUTREACH (OUTPATIENT)
Facility: OTHER | Age: 55
End: 2025-04-26
Payer: MEDICAID

## 2025-04-26 VITALS
SYSTOLIC BLOOD PRESSURE: 145 MMHG | TEMPERATURE: 98 F | DIASTOLIC BLOOD PRESSURE: 85 MMHG | OXYGEN SATURATION: 86 % | RESPIRATION RATE: 16 BRPM | HEART RATE: 84 BPM | HEIGHT: 56 IN | WEIGHT: 196 LBS | BODY MASS INDEX: 44.09 KG/M2

## 2025-04-26 DIAGNOSIS — M25.512 LEFT SHOULDER PAIN: Primary | ICD-10-CM

## 2025-04-26 DIAGNOSIS — R44.1 VISUAL HALLUCINATIONS: ICD-10-CM
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
## 2025-04-26 DIAGNOSIS — F23 ACUTE PSYCHOSIS: Primary | ICD-10-CM

## 2025-04-26 PROCEDURE — 25000003 PHARM REV CODE 250: Performed by: FAMILY MEDICINE

## 2025-04-26 PROCEDURE — 99284 EMERGENCY DEPT VISIT MOD MDM: CPT | Mod: 25

## 2025-04-26 RX ORDER — CYCLOBENZAPRINE HCL 5 MG
10 TABLET ORAL
Status: COMPLETED | OUTPATIENT
Start: 2025-04-26 | End: 2025-04-26

## 2025-04-26 RX ADMIN — CYCLOBENZAPRINE HYDROCHLORIDE 10 MG: 5 TABLET, FILM COATED ORAL at 11:04

## 2025-04-26 NOTE — ED PROVIDER NOTES
SCRIBE #1 NOTE: I, Jason Terrell, am scribing for, and in the presence of, Roopa Dupree MD. I have scribed the entire note.       History     Chief Complaint   Patient presents with    Arm Pain     EMS called to pt's home found her on floor c/o of hitting head and left arm pain, specifics are vague to EMS and pt is not totally forthcoming with events      Review of patient's allergies indicates:   Allergen Reactions    Codeine Itching    Hydromorphone Other (See Comments)     Can't wake up for long time if taken    Slow to wake up after surgery after receiving    Aleve [naproxen sodium]      Increases BP    Ibuprofen      Increases BP    Neuromuscular blockers, steroidal Hives     some    Pregabalin Itching    Versed [midazolam]     Latex, natural rubber Rash    Morphine Rash     itching    Norco [hydrocodone-acetaminophen] Itching, Rash and Hallucinations    Secobarbital sodium Rash     itching    Tylox [oxycodone-acetaminophen] Rash         History of Present Illness     HPI    4/26/2025, 10:57 AM  History obtained from the patient and medical records      History of Present Illness: Yolanda Betts is a 54 y.o. female patient with a PMHx of an abnormal pap smear, anemia, anxiety, arthritis, asthma, bipolar 1 disorder, T2DM, dyslipidemia, genital warts, GERD, HSV infection, HLD, HTN, migraines, ALEN, and schizoaffective disorder who presents to the Emergency Department for evaluation of a potential fall which occurred PTA. When EMS arrived on the scene, pt reported hitting her head and was c/o left arm pain. EMS reports that the specifics of the incident were vague and that she was not very forthcoming with details regarding the fall. Patient denies any fever, N/V, or SOB. No prior Tx specified.  No further complaints or concerns at this time.       Arrival mode: EMS    PCP: Rose Khan DNP        Past Medical History:  Past Medical History:   Diagnosis Date    Abnormal Pap smear of  cervix     HPV genital warts    Anemia     Anxiety     Arthritis     Asthma 10/11/2016    Bipolar 1 disorder     Diabetes mellitus, type 2     Dyslipidemia associated with type 2 diabetes mellitus 2019    Dyspnea due to COVID-19 2024    General anesthetics causing adverse effect in therapeutic use     Genital warts     GERD (gastroesophageal reflux disease)     Herpes simplex virus (HSV) infection     Hyperlipidemia     Hypertension     Hypertension complicating diabetes 2019    Migraine with aura and without status migrainosus, not intractable 2016    Mild persistent asthma without complication 10/11/2016    Morbid obesity with body mass index (BMI) of 45.0 to 49.9 in adult 2017    Myoclonus     Obstructive sleep apnea     ALEN (obstructive sleep apnea)     2L per N/C q HS    Schizoaffective disorder, bipolar type 2019    Seasonal allergic rhinitis due to pollen 2019    Seizures     Type 2 diabetes mellitus with hyperglycemia, with long-term current use of insulin 2017    Type 2 diabetes mellitus with hyperglycemia, without long-term current use of insulin 2017       Past Surgical History:  Past Surgical History:   Procedure Laterality Date    abcess removed right back      ANGIOGRAM, CORONARY, WITH LEFT HEART CATHETERIZATION N/A 2024    Procedure: Angiogram, Coronary, with Left Heart Cath;  Surgeon: Jaimie Wills MD;  Location: [facility] CATH LAB;  Service: Cardiology;  Laterality: N/A;    BELT ABDOMINOPLASTY  1996    BREAST SURGERY Bilateral     Reduction    CARPAL TUNNEL RELEASE Bilateral 2023    Procedure: RELEASE, CARPAL TUNNEL;  Surgeon: Neville Roth MD;  Location: [facility] OR;  Service: Orthopedics;  Laterality: Bilateral;  bilateral carpal tunnel release     SECTION      X 2    COLONOSCOPY      COLONOSCOPY N/A 2018    Procedure: colonoscopy for iron deficiency anemia;  Surgeon: Frankie Sanchez MD;  Location: [facility]  ENDO;  Service: Endoscopy;  Laterality: N/A;    COLONOSCOPY N/A 2/28/2018    Procedure: COLONOSCOPY;  Surgeon: Frankie Sanchez MD;  Location: [facility] ENDO;  Service: Endoscopy;  Laterality: N/A;    COLONOSCOPY N/A 3/10/2023    Procedure: COLONOSCOPY;  Surgeon: Lalita Montes De Oca MD;  Location: [facility] ENDO;  Service: Endoscopy;  Laterality: N/A;    COLONOSCOPY N/A 4/4/2024    Procedure: COLONOSCOPY;  Surgeon: Tara Og MD;  Location: [facility] ENDO;  Service: Endoscopy;  Laterality: N/A;    EPIDURAL STEROID INJECTION INTO CERVICAL SPINE N/A 1/31/2023    Procedure: C6/7 IL ROCAEL RN IV Sedation;  Surgeon: Otto Phillips MD;  Location: [facility] PAIN MGT;  Service: Pain Management;  Laterality: N/A;    EPIDURAL STEROID INJECTION INTO CERVICAL SPINE N/A 1/30/2024    Procedure: C5/6 IL ROCAEL;  Surgeon: Otto Phillips MD;  Location: [facility] PAIN MGT;  Service: Pain Management;  Laterality: N/A;    ESOPHAGOGASTRODUODENOSCOPY N/A 3/10/2023    Procedure: EGD (ESOPHAGOGASTRODUODENOSCOPY);  Surgeon: Lalita Montes De Oca MD;  Location: [facility];  Service: Endoscopy;  Laterality: N/A;    HYSTERECTOMY      w/ BSO; hypermenorrhea    INJECTION OF ANESTHETIC AGENT AROUND MEDIAL BRANCH NERVES INNERVATING CERVICAL FACET JOINT Bilateral 12/19/2023    Procedure: Bilateral C5-7 MBB (diagnostic);  Surgeon: Otto Phillips MD;  Location: [facility] PAIN MGT;  Service: Pain Management;  Laterality: Bilateral;    MOUTH SURGERY  1996    OOPHORECTOMY      hyst/bso, hypermenorrhea    ROBOT-ASSISTED CHOLECYSTECTOMY USING DA DARI XI N/A 1/3/2020    Procedure: XI ROBOTIC CHOLECYSTECTOMY;  Surgeon: Damir Driscoll MD;  Location: [facility] OR;  Service: General;  Laterality: N/A;    TOTAL REDUCTION MAMMOPLASTY      TUBAL LIGATION           Family History:  Family History   Problem Relation Name Age of Onset    Diabetes Mother      Hyperlipidemia Mother      Hypertension Mother      Asthma Mother      COPD Mother      Glaucoma Mother      Thyroid disease Mother   "    Anesthesia problems Mother          "almost had a cardiac arrest" , blood clots    Hypertension Father      Hyperlipidemia Father      Cancer Father          Brain, lung, liver, kidney    No Known Problems Sister      Hypertension Brother      Alcohol abuse Brother      Heart disease Maternal Grandmother      Hyperlipidemia Maternal Grandmother      Hypertension Maternal Grandmother      Cataracts Maternal Grandmother      Diabetes Maternal Grandmother      Heart disease Maternal Grandfather      Hyperlipidemia Maternal Grandfather      Hypertension Maternal Grandfather      Glaucoma Maternal Grandfather      Cancer Maternal Grandfather      Cataracts Maternal Grandfather      Macular degeneration Maternal Grandfather      Diabetes Maternal Grandfather      Heart disease Paternal Grandmother      Hyperlipidemia Paternal Grandmother      Hypertension Paternal Grandmother      Cataracts Paternal Grandmother      Heart disease Paternal Grandfather      Hyperlipidemia Paternal Grandfather      Hypertension Paternal Grandfather      Cataracts Paternal Grandfather      Breast cancer Maternal Cousin      Breast cancer Maternal Cousin      Breast cancer Maternal Cousin      Breast cancer Maternal Cousin         Social History:  Social History     Tobacco Use    Smoking status: Never     Passive exposure: Never    Smokeless tobacco: Never   Substance and Sexual Activity    Alcohol use: Not Currently     Comment: socially  No alcohol 72h prior to sx    Drug use: No    Sexual activity: Yes     Partners: Male     Birth control/protection: Surgical     Comment: [facility]        Review of Systems     Review of Systems   Constitutional:  Negative for fever.   HENT:  Negative for sore throat.    Respiratory:  Negative for shortness of breath.    Cardiovascular:  Negative for chest pain.   Gastrointestinal:  Negative for nausea and vomiting.   Genitourinary:  Negative for dysuria.   Musculoskeletal:  Positive for arthralgias (LUE). " "Negative for back pain.   Skin:  Negative for rash.   Neurological:  Negative for weakness.   Hematological:  Does not bruise/bleed easily.   All other systems reviewed and are negative.     Physical Exam     Initial Vitals [04/26/25 0939]   BP Pulse Resp Temp SpO2   116/76 84 16 98.7 °F (37.1 °C) 98 %      MAP       --          Physical Exam  Nursing Notes and Vital Signs Reviewed.  Constitutional: Patient is in no acute distress. Well-developed and well-nourished.  Head: Atraumatic. Normocephalic.  Eyes: PERRL. EOM intact. Conjunctivae are not pale. No scleral icterus.  ENT: Mucous membranes are moist. Oropharynx is clear and symmetric.    Neck: Supple. Full ROM. No lymphadenopathy.  Cardiovascular: Regular rate. Regular rhythm. No murmurs, rubs, or gallops. Distal pulses are 2+ and symmetric.  Pulmonary/Chest: No respiratory distress. Clear to auscultation bilaterally. No wheezing or rales.  Abdominal: Soft and non-distended.  There is no tenderness.  No rebound, guarding, or rigidity. Good bowel sounds.  Genitourinary: No CVA tenderness.  Musculoskeletal: Left anterior shoulder pain. Moves all extremities. No obvious deformities. No edema. No calf tenderness.  Skin: Warm and dry.  Neurological:  Alert, awake, and appropriate.  Normal speech.  No acute focal neurological deficits are appreciated.  Psychiatric: Normal affect. Good eye contact. Appropriate in content.     ED Course   Procedures  ED Vital Signs:  Vitals:    04/26/25 0939 04/26/25 0945 04/26/25 1015 04/26/25 1100   BP: 116/76 (!) 141/78 (!) 144/82 (!) 142/85   Pulse: 84 88 87 87   Resp: 16 17 20 16   Temp: 98.7 °F (37.1 °C)      TempSrc: Oral      SpO2: 98% 99% 97% 100%   Weight: 88.9 kg (196 lb)      Height: 4' 8" (1.422 m)       04/26/25 1130 04/26/25 1230 04/26/25 1339   BP: (!) 157/88 (!) 154/84 (!) 145/85   Pulse: 79 84 84   Resp: 16 16 16   Temp:   98.3 °F (36.8 °C)   TempSrc:      SpO2: 100% 95% (!) 86%   Weight:      Height:          Abnormal " Lab Results:  Labs Reviewed - No data to display       All Lab Results:  Results for orders placed or performed in visit on 04/17/25   Iron and TIBC    Collection Time: 04/17/25  2:22 PM   Result Value Ref Range    Iron Level 48 30 - 160 ug/dL    Transferrin 351 200 - 375 mg/dL    Iron Binding Capacity Total 519 (H) 250 - 450 ug/dL    Iron Saturation 9 (L) 20 - 50 %   Ferritin    Collection Time: 04/17/25  2:22 PM   Result Value Ref Range    Ferritin 28.0 20.0 - 300.0 ng/mL   C-Reactive Protein    Collection Time: 04/17/25  2:22 PM   Result Value Ref Range    CRP 20.3 (H) <=8.2 mg/L   Sedimentation rate    Collection Time: 04/17/25  2:22 PM   Result Value Ref Range    Sed Rate 101 (H) <=36 mm/hr   CBC with Differential    Collection Time: 04/17/25  2:22 PM   Result Value Ref Range    WBC 8.35 3.90 - 12.70 K/uL    RBC 4.53 4.00 - 5.40 M/uL    HGB 9.9 (L) 12.0 - 16.0 gm/dL    HCT 35.1 (L) 37.0 - 48.5 %    MCV 78 (L) 82 - 98 fL    MCH 21.9 (L) 27.0 - 31.0 pg    MCHC 28.2 (L) 32.0 - 36.0 g/dL    RDW 18.6 (H) 11.5 - 14.5 %    Platelet Count 392 150 - 450 K/uL    MPV 9.3 9.2 - 12.9 fL    Nucleated RBC 0 <=0 /100 WBC    Neut % 50.6 38 - 73 %    Lymph % 41.2 18 - 48 %    Mono % 6.2 4 - 15 %    Eos % 0.8 <=8 %    Basophil % 0.5 <=1.9 %    Imm Grans % 0.7 (H) 0.0 - 0.5 %    Neut # 4.22 1.8 - 7.7 K/uL    Lymph # 3.44 1 - 4.8 K/uL    Mono # 0.52 0.3 - 1 K/uL    Eos # 0.07 <=0.5 K/uL    Baso # 0.04 <=0.2 K/uL    Imm Grans # 0.06 (H) 0.00 - 0.04 K/uL     *Note: Due to a large number of results and/or encounters for the requested time period, some results have not been displayed. A complete set of results can be found in Results Review.         Imaging Results:  Imaging Results              X-Ray Shoulder Trauma Left (Final result)  Result time 04/26/25 12:04:00      Final result by Pelon Frye MD (04/26/25 12:04:00)                   Impression:      No acute abnormality identified.  Further evaluation as  needed.    Finalized on: 4/26/2025 12:04 PM By:  Pelon Frye MD  [facility]# [id number]      2025-04-26 12:06:03.236     [facility]               Narrative:    EXAM:  XR SHOULDER TRAUMA 3 VIEW LEFT    CLINICAL HISTORY: Left shoulder pain.    FINDINGS: Glenohumeral and acromioclavicular alignment is normal.  No fracture or other acute osseous abnormality is seen.  Mild degenerative change.  No evidence of rotator cuff or bursal calcium deposition.                                         CT Head Without Contrast (Final result)  Result time 04/26/25 11:25:26      Final result by Cuca Mejia MD (04/26/25 11:25:26)                   Impression:      No evidence of an acute intracranial abnormality.        Finalized on: 4/26/2025 11:25 AM By:  Cuca Mejia MD  [facility]# [id number]      2025-04-26 11:27:31.119     [facility]               Narrative:    PROCEDURE:  CT HEAD WITHOUT CONTRAST    INDICATION:  Moderate to severe head trauma    TECHNIQUE:  Multidetector-row CT examination of the brain was performed from base to vertex [without] intravenous contrast material.This CT utilized automated exposure control and/or adjustment of the mA according to patient size and/or iterative reconstruction technique(s).    COMPARISON:CT of the head 04/15/2025    FINDINGS:  The ventricles and sulci are normal in size and contour for age.  There is no evidence of intracranial hemorrhage or extra-axial collection.  No shift of the midline structures or mass effect is seen.  Gray-white matter differentiation is maintained.    No acute fracture of the skull base or calvarium is identified.  The visualized paranasal sinuses are clear.  The mastoid air cells are clear.  The visualized orbits are unremarkable.                                             The Emergency Provider reviewed the vital signs and test results, which are outlined above.     ED Discussion     1:02 PM: Reassessed pt at this time. Discussed with patient and/or family/caretaker all pertinent ED  information and results. Discussed pt dx and plan of tx. Gave the patient all f/u and return to the ED instructions. All questions and concerns were addressed at this time. Patient and/or family/caretaker expresses understanding of information and instructions, and is comfortable with plan to discharge. Pt is stable for discharge.     I discussed with patient and/or family/caretaker that evaluation in the ED does not suggest any emergent or life threatening medical conditions requiring immediate intervention beyond what was provided in the ED, and I believe patient is safe for discharge.  Regardless, an unremarkable evaluation in the ED does not preclude the development or presence of a serious of life threatening condition. As such, I instructed that the patient is to return immediately for any worsening or change in current symptoms.       Medical Decision Making  Amount and/or Complexity of Data Reviewed  Labs: ordered. Decision-making details documented in ED Course.  Radiology: ordered and independent interpretation performed. Decision-making details documented in ED Course.    Risk  Prescription drug management.                ED Medication(s):  Medications   cyclobenzaprine tablet 10 mg (10 mg Oral Given 4/26/25 1140)       Discharge Medication List as of 4/26/2025  1:41 PM           Follow-up Information       Schedule an appointment as soon as possible for a visit  with Rose Khan DNP.    Specialty: Family Medicine  Why: As needed  Contact information:  [address] [facility]  [address] [place] [id number] [phone]                                 Scribe Attestation:   Scribe #1: I performed the above scribed service and the documentation accurately describes the services I performed. I attest to the accuracy of the note.     Attending:   Physician Attestation Statement for Scribe #1: ILeia Michelle E, MD, personally performed the services described in this documentation, as scribed by  Jason Terrell, in my presence, and it is both accurate and complete.           Clinical Impression       ICD-10-CM ICD-9-CM   1. Left shoulder pain  M25.512 719.41   2. Fall, initial encounter  W19.XXXA E888.9       Disposition:   Disposition: Discharged  Condition: Stable         Roopa Dupree MD  04/27/25 0656

## 2025-04-26 NOTE — TELEPHONE ENCOUNTER
Spoandrea Vanegas, Yolanda's daughter also listed as Yolanda's emergency contact reports Yolanda has a mental health tele visit scheduled this upcoming Monday with Dr. Mariam Young. Daughter calling to give update on pt's mental health status. Per Romina, Yolanda had agitated episode that lasted approximately one hour at at 3:50 p.m today.  Yolanda experienced visual hallucinations while in the bath tub, said she was seeing bed bugs, ran out of her bathtub and across the street to the neighbor's house naked and asked the neighbor to hose her down. Yolanda called Romina at some point during this episode. Romina arrived to Yolanda's house, contacted 's office and was advised to call 911. Romina called 911, police arrived to Yolanda's home and advised Romina that because Yolanda was answering all questions appropriately, Yolanda would not be taken in. Per Romina, EMS was not on scene. Romina states Yolanda has since then calmed down and is currently awake, calm, sitting in living room, watching TV. Romina states Yolanda covered her bed in baby powder. Romina and sister are currently cleaning.  Romina voices concern about Yolanda with frequent episodes of delusion,  lives alone and has hx of mental health conditions, bipolar and schizophrenia with paranoid behavior. Advised Romina per triage protocol, patient should see a physician (or Provider triage) within next 4 hours at nearest ED. Romina request on call provider be contacted. No Psychiatrist on call. Referred to [facility]. Advised Romina per Dr. Kelly Pisano, [facility] OCP- if feels patient is unsafe to be alone or possible danger to herself or others, recommend nearest ER now. If unable to get patient to ED, recommend daughter call [phone] now (suicide or emotional distress line,use like 911). Romina v/u.     Reason for Disposition   [1] Acting confused (e.g., disoriented, slurred speech) AND [2] brief (now gone)    Additional Information   Negative: [1] Difficult to awaken or acting confused (e.g.,  disoriented, slurred speech) AND [2] present now AND [3] diabetes mellitus   Negative: [1] Difficult to awaken or acting confused (e.g., disoriented, slurred speech) AND [2] present now AND [3] new-onset   Negative: [1] Weakness of the face, arm, or leg on one side of the body AND [2] new-onset   Negative: [1] Numbness of the face, arm, or leg on one side of the body AND [2] new-onset   Negative: [1] Loss of speech or garbled speech AND [2] new-onset   Negative: Difficulty breathing or bluish lips   Negative: Shock suspected (e.g., cold/pale/clammy skin, too weak to stand, low BP, rapid pulse)   Negative: Seeing, hearing, or feeling things that are not there (i.e., visual, auditory, or tactile hallucinations)   Negative: Followed a head injury   Negative: Drug overdose suspected   Negative: Violent behavior, or threatening to physically hurt or kill someone   Negative: Sounds like a life-threatening emergency to the triager   Negative: Headache or vomiting   Negative: Stiff neck (can't touch chin to chest)   Negative: Very strange or paranoid behavior   Negative: Fever > 100.4 F (38.0 C)   Negative: Patient sounds very sick or weak to the triager    Protocols used: Confusion - Delirium-A-AH

## 2025-04-27 NOTE — PLAN OF CARE-OVED
[facility] Emergency Department Plan of Care Note  Referral Source: Nurse On-Call                               Chief Complaint   Patient presents with    Agitation     Romina, Yolanda's daughter, reports Yolanda is having visual hallucinations and agitation. She has bipolar disorder and paranoid schizophrenia. Yolanda had an agitated episode that lasted approximately one hour at at 3:50 p.m today. She experienced visual hallucinations while in the bath tub, said she was seeing bed bugs, ran out of her bathtub and across the street to the neighbor's house naked and asked the neighbor to hose her down. Yolanda called Romina at some point during this episode. Romina arrived to Yolanda's house, contacted 's office and was advised to call 911. Romina called 911, police arrived to Yolanda's home and advised Romina that because Yolanda was answering all questions appropriately, Yolanda would not be taken in. Per Romina, EMS was not on scene. Romina states Yolanda has since then calmed down and is currently awake, calm, sitting in living room, watching TV. Romina states Yolanda covered her bed in baby powder. Romina and sister are currently cleaning. Romina voices concern as Yolanda lives alone, has hx of mental health conditions. She has a telehealth visit scheduled for Monday.     Recommendation: Emergency Department            Emergency Department: [facility]          Recommendation comment: family will call [phone] (mental health crisis like)    Encounter Diagnoses   Name Primary?    Acute psychosis Yes    Visual hallucinations

## 2025-04-27 NOTE — PROGRESS NOTES
"Patient's daughter called the OOC RN on 4/26/25 for c/o "having visual hallucinations and agitation. She has bipolar disorder and paranoid schizophrenia. Yolanda had an agitated episode that lasted approximately one hour at at 3:50 p.m today. She experienced visual hallucinations while in the bath tub, said she was seeing bed bugs, ran out of her bathtub and across the street to the neighbor's house naked and asked the neighbor to hose her down. Yolanda called Romina at some point during this episode. Romina arrived to Yolanda's house, contacted 's office and was advised to call 911. Romina called 911, police arrived to Yolanda's home and advised Romina that because Yolanda was answering all questions appropriately, Yolanda would not be taken in. Per Romina, EMS was not on scene. Romina states Yolanda has since then calmed down and is currently awake, calm, sitting in living room, watching TV. Romina states Yolanda covered her bed in baby powder. Romina and sister are currently cleaning. Romina voices concern as Yolanda lives alone, has hx of mental health conditions. She has a telehealth visit scheduled for Monday". OOC RN consult [facility].     [facility] Provider Dr Kelly Pisano disposition recommendation patient to go to Emergency Department            Emergency Department: [facility]          Recommendation comment: family will call [phone] (mental health crisis line)    Follow up scheduled for 4/27/25 to assess for additional needs.      "

## 2025-04-28 ENCOUNTER — TELEPHONE (OUTPATIENT)
Dept: PSYCHIATRY | Facility: CLINIC | Age: 55
End: 2025-04-28
Payer: MEDICAID

## 2025-04-28 ENCOUNTER — OFFICE VISIT (OUTPATIENT)
Dept: PSYCHIATRY | Facility: CLINIC | Age: 55
End: 2025-04-28
Payer: MEDICAID

## 2025-04-28 DIAGNOSIS — F51.05 INSOMNIA DUE TO OTHER MENTAL DISORDER: ICD-10-CM

## 2025-04-28 DIAGNOSIS — F99 INSOMNIA DUE TO OTHER MENTAL DISORDER: ICD-10-CM

## 2025-04-28 DIAGNOSIS — F41.1 GENERALIZED ANXIETY DISORDER: Chronic | ICD-10-CM

## 2025-04-28 DIAGNOSIS — F25.0 SCHIZOAFFECTIVE DISORDER, BIPOLAR TYPE: Primary | Chronic | ICD-10-CM

## 2025-04-28 PROCEDURE — G2211 COMPLEX E/M VISIT ADD ON: HCPCS | Mod: HP,HB,95, | Performed by: PSYCHOLOGIST

## 2025-04-28 PROCEDURE — 98007 SYNCH AUDIO-VIDEO EST HI 40: CPT | Mod: HP,HB,95, | Performed by: PSYCHOLOGIST

## 2025-04-28 PROCEDURE — 3052F HG A1C>EQUAL 8.0%<EQUAL 9.0%: CPT | Mod: CPTII,95,, | Performed by: PSYCHOLOGIST

## 2025-04-28 PROCEDURE — 3072F LOW RISK FOR RETINOPATHY: CPT | Mod: CPTII,95,, | Performed by: PSYCHOLOGIST

## 2025-04-28 PROCEDURE — 1159F MED LIST DOCD IN RCRD: CPT | Mod: CPTII,95,, | Performed by: PSYCHOLOGIST

## 2025-04-28 NOTE — TELEPHONE ENCOUNTER
Nurse spoke with the patient's daughter (Romina Betts). Nurse informed the patient's daughter that she can take the patient to [facility] for voluntary admission. Nurse informed her that the  with [facility] stated that they can come do the intake and they sit and wait due to there is a patient that is discharging today, they just don't know when the patient will be discharged due to transportation. Patient daughter stated they will sit and wait.       No

## 2025-04-28 NOTE — PROGRESS NOTES
Outpatient Psychiatry Follow-Up Visit    4/28/2025    Virtual Visit    The patient location is: Patient's home/ Patient reported that his/her location at the time of this visit was in the [facility]     Visit type: Virtual visit with synchronous audio and video     Each patient to whom he or she provides medical services by telehealth is: (1) informed of the relationship between the medical psychologist and patient and the respective role of any other health care provider with respect to management of the patient; and (2) notified that he or she may decline to receive medical services by telehealth and may withdraw from such care at any time.    I also informed patient of the following:   Mariam Young, PhD, MPAP:  [place] medical license number: [id number]    My contact info:  [facility] / 2nd Floor  [address] [place] [address]   Ph: [phone]    If technology issues, call office phone: Ph: [phone] or [phone]  If crisis: Dial 911 or go to nearest Emergency Room (ER)  If questions related to privacy practices: contact [facility] Information Department: [phone]    Preferred Name: Yolanda  Gender Identity: cis female  Preferred Pronouns: she/her      History of Present Illness    CHIEF COMPLAINT:  Yolanda presents with worsening hallucinations and recent erratic behavior, including falling out of bed, running out of the house naked, and sleepwalking. Her daughters, Romina and Tiara, were also present with her.    HPI:  Yolanda experienced several concerning incidents recently. On Saturday, she fell out of bed and hit her head, resulting in an ER visit. Later that day, she believed she saw bedbugs in the bathtub, leading her to run out of the house naked. Her neighbors assisted in getting her dressed while she was on the phone trying to get an , though no actual bugs were found.    Yolanda's nephew reported that at 3:00 AM, she was walking  "around the house saying "hello" and going in and out of the door. When called, the patient claimed someone was knocking on her door and window, though this was not the case. Yolanda acknowledges that she sleepwalks.    Yolanda has been experiencing worsening hallucinations, which appear to be independent of her mood. These hallucinations, including the bedbug incident, were occurring even before a recent medication change from Vraylar to Geodon (ziprasidone) 40 mg twice daily, made about a week ago during a phone consultation.    Yolanda's condition is causing significant concern among her family members, who worry about her safety. Yolanda expresses reluctance to be admitted to a psychiatric facility, citing concerns about closed and locked doors.    Yolanda mentions having a scheduled procedure on May 16th to determine what kind of seizures she's experiencing. This procedure requires someone to stay with her for 1 week afterward. Yolanda denies remembering making phone calls at odd hours or the incidents reported by family members.    MEDICATIONS:  Yolanda has discontinued Vraylar. She recently started Geodon 40 mg twice daily orally for hallucinations, about a week ago. Geodon has replaced Vraylar in her medication regimen. She also takes lamotrigine 100 mg twice daily, Ambien CR 6.25 mg; Xanax has been 0.5 mg twice daily, though she was encouraged to decrease usage during the recent medicine change.    TESTS:  Yolanda is scheduled for a seizure testing procedure on May 16th to determine the type of seizures she experiences.    LIFESTYLE:  Yolanda lives with her nephew Keven, who stays with her but attends school during the day.      ROS:  Head: +recent head injury  Neurological: +seizures, +falling  Psychiatric: +hallucinations       PSYCHIATRIC: Pertinant items are noted in the narrative.    Past Medical, Family and Social History: The patient's past medical, family and social history have been reviewed and updated as appropriate within " "the electronic medical record - see encounter notes.     since March 2025.            4/28/2025     8:22 AM 4/11/2025     7:22 AM 1/23/2025    11:26 AM   GAD7   1. Feeling nervous, anxious, or on edge? 3 1 3   2. Not being able to stop or control worrying? 2 3 3   3. Worrying too much about different things? 3 3 3   4. Trouble relaxing? 3 3 3   5. Being so restless that it is hard to sit still? 3 3 3   6. Becoming easily annoyed or irritable? 3 3 3   7. Feeling afraid as if something awful might happen? 3 3 3   8. If you checked off any problems, how difficult have these problems made it for you to do your work, take care of things at home, or get along with other people? 2 3 3   JOANN-7 Score 20  19  21        Patient-reported      0-4 = Minimal anxiety  5-9 = Mild anxiety  10-14 = Moderate anxiety  15-21 = Severe anxiety       Risk Parameters:  Patient reports no suicidal ideation  Patient reports no homicidal ideation  Patient reports no self-injurious behavior  Patient reports no violent behavior    Exam (detailed: at least 9 elements; comprehensive: all 15 elements)   Constitutional  Vitals:  Most recent vital signs were reviewed.   Last 3 sets of Vitals        4/15/2025     1:31 AM 4/17/2025     1:13 PM 4/26/2025     9:39 AM   Vitals - 1 value per visit   SYSTOLIC 150 120 116   DIASTOLIC 82 84 76   Pulse 100 84 84   Temp   98.7 °F (37.1 °C)   Resp 19  16   SPO2 99 %  98 %   Weight (lb)  196.43 196   Weight (kg)  89.1 88.905   Height  4' 8" (1.422 m) 4' 8" (1.422 m)   BMI (Calculated)  44.1 44   Pain Score  Nine           General:  age appropriate, casually dressed, neatly groomed     Musculoskeletal  Muscle Strength/Tone:  no tremor, no tic, none seen during this virtual visit   Gait & Station:  video visit     Psychiatric  Speech:  no latency; no press   Behavior: wnl   Mood & Affect:  anxious  congruent and appropriate, restricted   Thought Process:  normal and logical   Associations:  intact   Thought " Content:  hallucinations: (auditory: Yes and hearing noises/knocking sounds, visual: Yes and seeing bedbugs all over--feeling on body, too (tactile))   Insight:  has awareness of illness   Judgement: behavior is adequate to circumstances   Orientation:  grossly intact   Memory: Some difficulty recalling specifics--daughters helped   Language: grossly intact   Attention Span & Concentration:  Decreased   Fund of Knowledge:  intact and appropriate to age and level of education     General Impression:     Encounter Diagnoses   Name Primary?    Schizoaffective disorder, bipolar type Yes    Generalized anxiety disorder     Insomnia due to other mental disorder        Assessment & Plan    - Worsening hallucinations and sleepwalking, independent of mood.  - Recent medication change from Vraylar to Geodon has not resolved hallucinations.    PSYCHOSIS/MOOD:  - Continue Geodon 40 mg twice daily.  - Continue lamotrigine 100 mg twice daily.  - Referred for voluntary psychiatric admission to [facility] for medication adjustment and monitoring.    ANXIETY/SLEEP:  - Decrease Xanax usage--previously discussed decreasing to half tabs of 0.5 mg twice daily.  - Takes Cymbalta 60 mg twice daily prescribed by another provider (rheumatology).  - Caution with Ambien CR 6.25 mg.    MENTAL HEALTH TREATMENT:  - Recommended formal voluntary admission to [facility] to help with medication adjustments/changes.  Prior medicines: Prozac, Wellbutrin, Paxil, Lamictal, Lexapro, Celexa, Cymbalta, Remeron, Trileptal (?), Abilify, Seroquel (raised blood sugars), Risperdal, Latuda, Vraylar, Caplyta (seizures), Restoril, Ambien (groggy), Ambien CR, trazodone, doxepin, Xanax, clonazepam  - Nurse confirmed bed will be available today--will coordinate with Romina ([phone]).  - Consider Intensive Outpatient Program (IOP) if inpatient admission declined.         I spent a total of 42 minutes on the day of the visit. This includes face to face time and  non-face to face time preparing to see the patient (eg, review of tests), obtaining and/or reviewing separately obtained history, documenting clinical information in the electronic or other health record, independently interpreting results and communicating results to the patient/family/caregiver, or care coordinator.        Mariam Young, PhD,   Advanced Practice Medical Psychologist  [facility] [address] [place] [address]  [phone]   [phone] fax    This note was generated with the assistance of ambient listening technology. Verbal consent was obtained by the patient and accompanying visitor(s) for the recording of patient appointment to facilitate this note. I attest to having reviewed and edited the generated note for accuracy, though some syntax or spelling errors may persist. Please contact the author of this note for any clarification.

## 2025-04-28 NOTE — PATIENT INSTRUCTIONS

## 2025-04-28 NOTE — TELEPHONE ENCOUNTER
Called Romina to inquire about Yolanda--she has been admitted at [facility]--apparently admissions did not know that she was on her way, but it's been handled per Romina; hoping to get her medications adjusted for better control of her symptoms

## 2025-04-28 NOTE — TELEPHONE ENCOUNTER
My Chart message not read.    Patient aware to have labs repeated on 5/5 and call radiology scheduling to get joint scan

## 2025-04-28 NOTE — TELEPHONE ENCOUNTER
Nurse spoke with the patient's daughter (Romina Betts). Nurse informed the daughter that she can take the patient to [facility] for voluntary admission. Nurse informed her that the  with [facility] stated that they can come do the intake and they sit and wait due to there is a patient that is discharging today, they just don't know when the patient will be discharged due to transportation. Patient daughter stated they will sit and wait.

## 2025-04-29 ENCOUNTER — PATIENT OUTREACH (OUTPATIENT)
Facility: OTHER | Age: 55
End: 2025-04-29
Payer: MEDICAID

## 2025-04-29 NOTE — PROGRESS NOTES
Patient was advised to go to the Emergency Department on 4/26/25 and went to the Emergency Department on 4/28/25. Records are unable to be viewed as they are locked for patient privacy. A follow-up call was made to assess for additional needs. There was no answer. A message was left requesting a return call. Assistance will be provided as needed if the patient returns the call.

## 2025-05-01 ENCOUNTER — PATIENT OUTREACH (OUTPATIENT)
Dept: ADMINISTRATIVE | Facility: OTHER | Age: 55
End: 2025-05-01
Payer: MEDICAID

## 2025-05-01 NOTE — PROGRESS NOTES
CHW - Outreach Attempt    Community Health Worker left a voicemail message for 2nd attempt to contact patient regarding: Pt isn't available at the moment. 2nd attempt.   Community Health Worker to attempt to contact patient on:    5/15/25

## 2025-05-04 ENCOUNTER — NURSE TRIAGE (OUTPATIENT)
Dept: ADMINISTRATIVE | Facility: CLINIC | Age: 55
End: 2025-05-04
Payer: MEDICAID

## 2025-05-04 NOTE — TELEPHONE ENCOUNTER
Patient was discharged from [facility] earlier today and her prescriptions were sent to a closed pharmacy. There are 2 prescriptions on the MAR but patient reports there were other prescriptions sent as well. Suggested reaching out to [facility] or her PCP when clinic is open. Instructed to call back with additional questions or worsening of symptoms. Patient verbalized understanding.     Reason for Disposition   Caller requesting a CONTROLLED substance prescription refill (e.g., narcotics, ADHD medicines)    Protocols used: Medication Refill and Renewal Call-A-

## 2025-05-04 NOTE — TELEPHONE ENCOUNTER
Patient called to request a temporary refill of a controlled medication. Patient was previously triaged by the [facility] earlier today and advised to contact her ordering Provider at [facility] on Monday, 5/05/25 for assistance with a temporary refill.     Patient advised to adhere to care advice and contact her Behavioral Health Provider at [facility] on 5/05/25 and advised that controlled/narcotic medications cannot be refilled during the after-hours process.     Reason for Disposition   Caller has already spoken with another triager or PCP AND has further questions AND triager able to answer questions.    Additional Information   Negative: [1] Caller is not with the adult (patient) AND [2] reporting urgent symptoms   Negative: Lab result questions   Negative: Medication questions   Negative: Caller can't be reached by phone   Caller has already spoken to PCP (doctor or NP/PA) or another triager   Negative: Caller is angry or rude (e.g., hangs up, verbally abusive, yelling)   Negative: Caller hangs up   Negative: Caller has already spoken with the PCP and has no further questions.   Negative: Caller has already spoken with another triager and has no further questions.    Protocols used: Information Only Call - No Triage-A-, No Contact or Duplicate Contact Call-A-

## 2025-05-05 ENCOUNTER — TELEPHONE (OUTPATIENT)
Dept: NEUROLOGY | Facility: CLINIC | Age: 55
End: 2025-05-05
Payer: MEDICAID

## 2025-05-05 ENCOUNTER — LAB VISIT (OUTPATIENT)
Dept: LAB | Facility: HOSPITAL | Age: 55
End: 2025-05-05
Attending: INTERNAL MEDICINE
Payer: MEDICAID

## 2025-05-05 DIAGNOSIS — D84.821 DRUG-INDUCED IMMUNODEFICIENCY: ICD-10-CM

## 2025-05-05 DIAGNOSIS — Z79.899 IMMUNOCOMPROMISED STATE DUE TO DRUG THERAPY: ICD-10-CM

## 2025-05-05 DIAGNOSIS — R79.82 ELEVATED C-REACTIVE PROTEIN (CRP): ICD-10-CM

## 2025-05-05 DIAGNOSIS — Z51.81 ENCOUNTER FOR MEDICATION MONITORING: ICD-10-CM

## 2025-05-05 DIAGNOSIS — D84.821 IMMUNOCOMPROMISED STATE DUE TO DRUG THERAPY: ICD-10-CM

## 2025-05-05 DIAGNOSIS — M45.9 ANKYLOSING SPONDYLITIS, UNSPECIFIED SITE OF SPINE: ICD-10-CM

## 2025-05-05 DIAGNOSIS — Z79.899 DRUG-INDUCED IMMUNODEFICIENCY: ICD-10-CM

## 2025-05-05 LAB
ABSOLUTE EOSINOPHIL (OHS): 0.09 K/UL
ABSOLUTE MONOCYTE (OHS): 0.42 K/UL (ref 0.3–1)
ABSOLUTE NEUTROPHIL COUNT (OHS): 4.09 K/UL (ref 1.8–7.7)
ALBUMIN SERPL BCP-MCNC: 3.6 G/DL (ref 3.5–5.2)
ALP SERPL-CCNC: 84 UNIT/L (ref 40–150)
ALT SERPL W/O P-5'-P-CCNC: 35 UNIT/L (ref 10–44)
ANION GAP (OHS): 9 MMOL/L (ref 8–16)
AST SERPL-CCNC: 23 UNIT/L (ref 11–45)
BASOPHILS # BLD AUTO: 0.05 K/UL
BASOPHILS NFR BLD AUTO: 0.8 %
BILIRUB SERPL-MCNC: 0.2 MG/DL (ref 0.1–1)
BUN SERPL-MCNC: 8 MG/DL (ref 6–20)
CALCIUM SERPL-MCNC: 9.1 MG/DL (ref 8.7–10.5)
CHLORIDE SERPL-SCNC: 106 MMOL/L (ref 95–110)
CO2 SERPL-SCNC: 24 MMOL/L (ref 23–29)
CREAT SERPL-MCNC: 0.8 MG/DL (ref 0.5–1.4)
CRP SERPL-MCNC: 10.5 MG/L
ERYTHROCYTE [DISTWIDTH] IN BLOOD BY AUTOMATED COUNT: 18.2 % (ref 11.5–14.5)
ERYTHROCYTE [SEDIMENTATION RATE] IN BLOOD BY PHOTOMETRIC METHOD: 80 MM/HR
GFR SERPLBLD CREATININE-BSD FMLA CKD-EPI: >60 ML/MIN/1.73/M2
GLUCOSE SERPL-MCNC: 183 MG/DL (ref 70–110)
HCT VFR BLD AUTO: 31.2 % (ref 37–48.5)
HGB BLD-MCNC: 9.1 GM/DL (ref 12–16)
IMM GRANULOCYTES # BLD AUTO: 0.04 K/UL (ref 0–0.04)
IMM GRANULOCYTES NFR BLD AUTO: 0.6 % (ref 0–0.5)
LYMPHOCYTES # BLD AUTO: 1.83 K/UL (ref 1–4.8)
MCH RBC QN AUTO: 22.6 PG (ref 27–31)
MCHC RBC AUTO-ENTMCNC: 29.2 G/DL (ref 32–36)
MCV RBC AUTO: 77 FL (ref 82–98)
NUCLEATED RBC (/100WBC) (OHS): 0 /100 WBC
PLATELET # BLD AUTO: 329 K/UL (ref 150–450)
PMV BLD AUTO: 8.2 FL (ref 9.2–12.9)
POTASSIUM SERPL-SCNC: 3.7 MMOL/L (ref 3.5–5.1)
PROT SERPL-MCNC: 7.1 GM/DL (ref 6–8.4)
RBC # BLD AUTO: 4.03 M/UL (ref 4–5.4)
RELATIVE EOSINOPHIL (OHS): 1.4 %
RELATIVE LYMPHOCYTE (OHS): 28.1 % (ref 18–48)
RELATIVE MONOCYTE (OHS): 6.4 % (ref 4–15)
RELATIVE NEUTROPHIL (OHS): 62.7 % (ref 38–73)
SODIUM SERPL-SCNC: 139 MMOL/L (ref 136–145)
WBC # BLD AUTO: 6.52 K/UL (ref 3.9–12.7)

## 2025-05-05 PROCEDURE — 36415 COLL VENOUS BLD VENIPUNCTURE: CPT

## 2025-05-05 PROCEDURE — 85652 RBC SED RATE AUTOMATED: CPT

## 2025-05-05 PROCEDURE — 82565 ASSAY OF CREATININE: CPT

## 2025-05-05 PROCEDURE — 85025 COMPLETE CBC W/AUTO DIFF WBC: CPT

## 2025-05-05 PROCEDURE — 86140 C-REACTIVE PROTEIN: CPT

## 2025-05-06 ENCOUNTER — TELEPHONE (OUTPATIENT)
Dept: HEMATOLOGY/ONCOLOGY | Facility: CLINIC | Age: 55
End: 2025-05-06
Payer: MEDICAID

## 2025-05-06 ENCOUNTER — TELEPHONE (OUTPATIENT)
Dept: NEUROLOGY | Facility: CLINIC | Age: 55
End: 2025-05-06
Payer: MEDICAID

## 2025-05-06 ENCOUNTER — PATIENT OUTREACH (OUTPATIENT)
Dept: ADMINISTRATIVE | Facility: HOSPITAL | Age: 55
End: 2025-05-06
Payer: MEDICAID

## 2025-05-06 DIAGNOSIS — D50.9 IDA (IRON DEFICIENCY ANEMIA): Primary | ICD-10-CM

## 2025-05-06 NOTE — TELEPHONE ENCOUNTER
----- Message from Nurse Hansen sent at 4/28/2025 11:43 AM CDT -----  Contact: PILO YANCEY [[id number]]  Saad Caceres, Can you help reschedule this patient to next available appointment. I am unable to reschedule d/t insurance. Thanks  ----- Message -----  From: Romina Maldonado  Sent: 4/28/2025  10:44 AM CDT  To: Laura Castaneda Staff    .Type:  Patient Requesting CallWho Called:PILO YANCEY [[id number]]Does the patient know what this is regarding?:Patient requesting a call back to reschedule appointment Would the patient rather a call back or a response via [facility]? Call [facility] Call Back Number:.[phone] (home)  Additional Information:

## 2025-05-06 NOTE — PROGRESS NOTES
Working mammogram report:    Chart searched  Attempted to contact pt regarding overdue mammogram  Scheduled appointment 06/16/25

## 2025-05-06 NOTE — TELEPHONE ENCOUNTER
----- Message from LateshaNallatech sent at 4/23/2025  3:24 PM CDT -----  Contact: self  .Type:  Sooner Apoointment RequestCaller is requesting a sooner appointment.  Caller declined first available appointment listed below.  Caller will not accept being placed on the waitlist and is requesting a message be sent to doctor.Name of Caller:.Yolanda Martin Lidia is the first available appointment?Symptoms:loss of memory , shaking, balanceWould the patient rather a call back or a response via [facility]? Call [facility] Call Back Number:.[phone] Information: Pt is calling to schedule an appt with this office due to her loss of memory , shaking, balance concerns. Decision tree didn't allow me to schedule.

## 2025-05-06 NOTE — TELEPHONE ENCOUNTER
Called Pt today about scheduling an appt with the provider. Pt picks up and has been schedule on 5/12/25 at 3:00pm.

## 2025-05-07 ENCOUNTER — TELEPHONE (OUTPATIENT)
Dept: PSYCHIATRY | Facility: CLINIC | Age: 55
End: 2025-05-07
Payer: MEDICAID

## 2025-05-07 ENCOUNTER — TELEPHONE (OUTPATIENT)
Dept: RHEUMATOLOGY | Facility: CLINIC | Age: 55
End: 2025-05-07
Payer: MEDICAID

## 2025-05-07 ENCOUNTER — PATIENT MESSAGE (OUTPATIENT)
Dept: RHEUMATOLOGY | Facility: CLINIC | Age: 55
End: 2025-05-07
Payer: MEDICAID

## 2025-05-07 ENCOUNTER — RESULTS FOLLOW-UP (OUTPATIENT)
Dept: RHEUMATOLOGY | Facility: CLINIC | Age: 55
End: 2025-05-07

## 2025-05-07 DIAGNOSIS — M45.9 ANKYLOSING SPONDYLITIS, UNSPECIFIED SITE OF SPINE: Primary | ICD-10-CM

## 2025-05-07 RX ORDER — IXEKIZUMAB 80 MG/ML
INJECTION, SOLUTION SUBCUTANEOUS
Qty: 4 ML | Refills: 1 | Status: ACTIVE | OUTPATIENT
Start: 2025-05-07

## 2025-05-07 NOTE — TELEPHONE ENCOUNTER
----- Message from Mariam Young sent at 5/7/2025  5:20 PM CDT -----  Contact: Yolanda  There's nothing there yet...  ----- Message -----  From: Cinthya Krishnamurthy MA  Sent: 5/7/2025   4:24 PM CDT  To: Mariam Young, PhD, MPAP    I called the patient to explain and she is now questioning what med she should be taking.  She said the [facility] changed her meds up and now she doesn't know what to take for sure.  I explained it should show on the discharge papers she received exactly what she should be taking.  She can not locate the paperwork.  I looked through the fax box back to 5/3 and do not find discharge papers received.  I see something insurance related in the  done by [facility] so are you able to see the discharge notes??  ----- Message -----  From: Mariam Young, PhD, MPAP  Sent: 5/7/2025   3:31 PM CDT  To: Cinthya Krishnamurthy MA    We are okay with her visits--she has a 60 min virtual, too--that should be good. Thank you!  ----- Message -----  From: Cinthya Krishnamurthy MA  Sent: 5/7/2025   2:56 PM CDT  To: Mariam Young, PhD, MPAP    She has an in person f/u appt already booked for 5/22 - will that be ok or if not where do you want her moved to?  ----- Message -----  From: Nehal Johnson  Sent: 5/7/2025  11:54 AM CDT  To: Hector ANDERSON Staff    Type:  Needs Medical AdviceWho Called: Chemaould the patient rather a call back or a response via [facility]? Call [facility] Call Back Number:  [phone] Information: Yolanda stated she was just release from the hospital on 05/05 and need to know if Dr. Young need to see her.

## 2025-05-07 NOTE — TELEPHONE ENCOUNTER
Spoke with pt again and she located her discharge papers which show the medications prescribed by Jeanine Garsia at [facility] but the Lunesta script show as printed and she does not have the paper script so is without that med and also she wants to know if she can still take the xanax prescribed by Dr Young she would also like to speak with Dr Young or the nurse if at all possible

## 2025-05-07 NOTE — TELEPHONE ENCOUNTER
Per Dr. PARKER:   ESR still elevated. Try taltz instead of cosentyx. Thanks.          Follow up message sent to patient via [facility] patient portal.

## 2025-05-07 NOTE — TELEPHONE ENCOUNTER
Patient was discharged from hospital 5/5 for psychosis.    She had her 2 week lab work on 5/5 and the nuclear joint scan scheduled for 5/9  (recommended due to abnormal labs done on 4/17)      Requesting lab results from 5/5 and if she needs to do any additional labs on 5/9 when she come in for the scan

## 2025-05-07 NOTE — TELEPHONE ENCOUNTER
----- Message from Nehal sent at 5/7/2025 11:59 AM CDT -----  Contact: Yolanda  Type:  Sooner Apoointment RequestCaller is requesting a sooner appointment.  Caller declined first available appointment listed below.  Caller will not accept being placed on the waitlist and is requesting a message be sent to doctor.Name of Caller: Citlali is the first available appointment? NoneSymptoms: Need to be seen after hospital stay because of pain level.Would the patient rather a call back or a response via [facility]? Call [facility] Call Back Number: [phone] Information:

## 2025-05-08 ENCOUNTER — TELEPHONE (OUTPATIENT)
Dept: PSYCHIATRY | Facility: CLINIC | Age: 55
End: 2025-05-08
Payer: MEDICAID

## 2025-05-08 DIAGNOSIS — H04.129 DRY EYE: ICD-10-CM

## 2025-05-08 DIAGNOSIS — F99 INSOMNIA DUE TO OTHER MENTAL DISORDER: Primary | ICD-10-CM

## 2025-05-08 DIAGNOSIS — G40.409 MYOCLONIC SEIZURE: ICD-10-CM

## 2025-05-08 DIAGNOSIS — F51.05 INSOMNIA DUE TO OTHER MENTAL DISORDER: Primary | ICD-10-CM

## 2025-05-08 DIAGNOSIS — K59.09 CHRONIC CONSTIPATION: ICD-10-CM

## 2025-05-08 DIAGNOSIS — N95.2 VAGINAL ATROPHY: ICD-10-CM

## 2025-05-08 RX ORDER — ESZOPICLONE 1 MG/1
1 TABLET, FILM COATED ORAL
Qty: 30 TABLET | Refills: 0 | Status: SHIPPED | OUTPATIENT
Start: 2025-05-08

## 2025-05-08 RX ORDER — LIFITEGRAST 50 MG/ML
1 SOLUTION/ DROPS OPHTHALMIC 2 TIMES DAILY
Qty: 60 EACH | Refills: 12 | Status: CANCELLED | OUTPATIENT
Start: 2025-05-08

## 2025-05-08 RX ORDER — LIFITEGRAST 50 MG/ML
1 SOLUTION/ DROPS OPHTHALMIC 2 TIMES DAILY
Qty: 60 EACH | Refills: 12 | Status: SHIPPED | OUTPATIENT
Start: 2025-05-08

## 2025-05-08 NOTE — TELEPHONE ENCOUNTER
Per [facility]-     Taltz PA approved until 11/3/25        Based on last refill of Cosentyx, patient will be due for Taltz starting 5/22.

## 2025-05-08 NOTE — TELEPHONE ENCOUNTER
called pt pt state she need to know what medication should she be taking and what medication she should stop. what time of day should she take the medication together and that she is missing her lunesta Rx that she was taking at the facility. Do she suppose to keep taking this medication or was it just for her at the hospital??

## 2025-05-08 NOTE — TELEPHONE ENCOUNTER
----- Message from Abdiaziz sent at 5/8/2025  1:09 PM CDT -----  Contact: self  Type:  RX Refill RequestWho Called: Yolanda BettsRefill or New Rx:refillRX Name and Strength:13 prescriptionsHow is the patient currently taking it? (ex. 1XDay):Is this a 30 day or 90 day RX:Preferred Pharmacy with phone number:[facility] [facility] [address]: [phone] Fax: [phone] or Mail Order:localOrdering Provider:Adal the patient rather a call back or a response via [facility]? Call [facility] Call Back Number:[phone] Information: the patient was calling because not all of her prescriptions were called in after she was discharged from the hospital.. She would like a call and would like to have  them called in today if possible.

## 2025-05-09 RX ORDER — ESTRADIOL 0.1 MG/G
1 CREAM VAGINAL
Qty: 42.5 G | Refills: 3 | OUTPATIENT
Start: 2025-05-12 | End: 2026-05-12

## 2025-05-12 ENCOUNTER — TELEMEDICINE (OUTPATIENT)
Dept: PRIMARY CARE CLINIC | Facility: CLINIC | Age: 55
End: 2025-05-12
Payer: MEDICAID

## 2025-05-12 ENCOUNTER — OFFICE VISIT (OUTPATIENT)
Dept: PSYCHIATRY | Facility: CLINIC | Age: 55
End: 2025-05-12
Payer: MEDICAID

## 2025-05-12 DIAGNOSIS — F99 INSOMNIA DUE TO OTHER MENTAL DISORDER: ICD-10-CM

## 2025-05-12 DIAGNOSIS — F51.05 INSOMNIA DUE TO OTHER MENTAL DISORDER: ICD-10-CM

## 2025-05-12 DIAGNOSIS — F25.0 SCHIZOAFFECTIVE DISORDER, BIPOLAR TYPE: Primary | Chronic | ICD-10-CM

## 2025-05-12 DIAGNOSIS — F31.2 BIPOLAR AFFECTIVE DISORDER, CURRENT EPISODE MANIC WITH PSYCHOTIC SYMPTOMS: Primary | ICD-10-CM

## 2025-05-12 DIAGNOSIS — F41.1 GENERALIZED ANXIETY DISORDER: ICD-10-CM

## 2025-05-12 DIAGNOSIS — Z86.59 HX OF SCHIZOPHRENIA: ICD-10-CM

## 2025-05-12 DIAGNOSIS — F41.1 GENERALIZED ANXIETY DISORDER: Chronic | ICD-10-CM

## 2025-05-12 PROCEDURE — 90833 PSYTX W PT W E/M 30 MIN: CPT | Mod: HP,HB,95, | Performed by: PSYCHOLOGIST

## 2025-05-12 PROCEDURE — 3052F HG A1C>EQUAL 8.0%<EQUAL 9.0%: CPT | Mod: CPTII,95,, | Performed by: PSYCHOLOGIST

## 2025-05-12 PROCEDURE — 1159F MED LIST DOCD IN RCRD: CPT | Mod: CPTII,95,, | Performed by: PSYCHOLOGIST

## 2025-05-12 PROCEDURE — G2211 COMPLEX E/M VISIT ADD ON: HCPCS | Mod: HP,HB,95, | Performed by: PSYCHOLOGIST

## 2025-05-12 PROCEDURE — 98007 SYNCH AUDIO-VIDEO EST HI 40: CPT | Mod: HP,HB,95, | Performed by: PSYCHOLOGIST

## 2025-05-12 PROCEDURE — 3072F LOW RISK FOR RETINOPATHY: CPT | Mod: CPTII,95,, | Performed by: PSYCHOLOGIST

## 2025-05-12 RX ORDER — LAMOTRIGINE 100 MG/1
100 TABLET ORAL 2 TIMES DAILY
Qty: 60 TABLET | Refills: 1 | Status: SHIPPED | OUTPATIENT
Start: 2025-06-09 | End: 2025-08-08

## 2025-05-12 RX ORDER — HALOPERIDOL 5 MG/1
5 TABLET ORAL 2 TIMES DAILY
Qty: 60 TABLET | Refills: 0 | Status: SHIPPED | OUTPATIENT
Start: 2025-05-12 | End: 2025-06-11

## 2025-05-12 RX ORDER — LAMOTRIGINE 25 MG/1
TABLET ORAL
Qty: 42 TABLET | Refills: 0 | Status: SHIPPED | OUTPATIENT
Start: 2025-05-12 | End: 2025-06-11

## 2025-05-12 RX ORDER — ESZOPICLONE 1 MG/1
1 TABLET, FILM COATED ORAL
Qty: 30 TABLET | Refills: 1 | Status: SHIPPED | OUTPATIENT
Start: 2025-05-12 | End: 2025-07-11

## 2025-05-12 RX ORDER — DULOXETIN HYDROCHLORIDE 60 MG/1
60 CAPSULE, DELAYED RELEASE ORAL 2 TIMES DAILY
Qty: 60 CAPSULE | Refills: 1 | Status: SHIPPED | OUTPATIENT
Start: 2025-05-12 | End: 2025-07-11

## 2025-05-12 RX ORDER — LEVETIRACETAM 500 MG/1
750 TABLET ORAL 2 TIMES DAILY
Qty: 90 TABLET | Refills: 0 | Status: SHIPPED | OUTPATIENT
Start: 2025-05-12 | End: 2026-05-12

## 2025-05-12 RX ORDER — ALPRAZOLAM 1 MG/1
1 TABLET ORAL DAILY PRN
Qty: 30 TABLET | Refills: 1 | Status: SHIPPED | OUTPATIENT
Start: 2025-05-12 | End: 2025-07-11

## 2025-05-12 NOTE — PATIENT INSTRUCTIONS
"[facility]PART[facility] OF [facility]YCHIATRY   PATIENT INFORMATION    We appreciate the opportunity to participate in your medical care and hope the following protocols will make it easier for you to receive quality treatment in our department.    PUNCTUALITY: Your appointment is scheduled for a fixed amount of time, reserved especially for you.  To get the benefit of your appointment, please arrive at least 15 minutes early to allow time for traffic, parking and registration.  Should you arrive more than 15 minutes late to your appointment, you will be rescheduled in order to assure your clinician has adequate time to assess you and provide helpful care.      APPOINTMENTS: Appointments are made by the nursing/front office staff or through the patient portal. Providers do not have access  to schedule appointments. Walk in appointments are not available. FOR EMERGENCIES, PLEASE GO THE CLOSEST EMERGENCY ROOM.    CANCELLATION/MISSED APPOINTMENTS:   In order to receive quality care, all appointments must be kept.  If you are unable to keep an appointment, please reschedule at least 3 days prior if possible. Late cancellations (within 24 hours of the appointment) and repeated no-show appointments may result in dismissal from the clinic. After two no show/late cancellation visits, you will receive a notice letter, alerting you to keep visits to prevent department dismissal. If another visit is missed after receipt of the notice, you will be discharged from the clinic. This policy is in effect to allow for other individuals on a long waiting list to be seen as soon as possible. Unlike other branches of medicine where several individuals can be scheduled in a 30 minute time slot, only one individual can be scheduled in any time slot in Psychiatry.     MESSAGES: For simple questions/concerns, you may contact your individual providers electronically through the "[facility]" portal or by calling [phone] " with messages relayed via office staff. If relevant, include pharmacy name and phone number, date of last visit and next scheduled visit, phone number where you can be reached throughout the day, and whether leaving a voicemail or message on an answering machine is acceptable. Messages will be returned by the Medical Assistant or Office Staff after your provider has reviewed the message.  Please allow 24 hours for a returned voicemail message before leaving another voicemail message. Voicemail messages will be checked each workday (Monday through Friday) during office hours (8:00 a.m. and 5:00 p.m.) and returned at most within one business day.  You may leave a non-urgent message after hours. Note that psychotherapy and medication management are not appropriate by telephone or the patient portal. Portal messages may take up to 72 hours for a direct response from your provider.    PRESCRIPTION REFILLS:  Please communicate with your prescriber about any refills you need during your appointment. You may also request refills through the [facility] portal (preferred) or by calling the clinic. Prescriptions will be filled during office hours.     Please do not wait until you are completely out of medication to request refills. Same day refills are not always possible. Patients may experience symptoms of withdrawal if they run out of medications. The patient assumes all responsibility when there is an issue with non-compliance with follow-up appointments and medications.  Some medications are controlled and regulated by the FDA and [place]. Some of these medications can not be refilled before 30 days and require a face to face appointment.     PAPERWORK REQUESTS: If you have any forms or letters that need to be completed by your doctor, please present these at the beginning of the appointment to ensure that information needed to complete them is obtained during the office visit. Paperwork is completed during office visits  "only.    SPECIAL EVALUATIONS: Please note that our department is treatment-focused. As such, we focus on treatment-oriented evaluations and do not perform specialty or "forensic" evaluations. Examples are listed below.    Disability: We do not do disability evaluations.  Please contact Social Security Administration for evaluations and determinations. You will then sign releases allowing for records from your treatment providers to be forwarded to Social Security Administration to use in their evaluation.  Gun Permit: We do not offer Sound Judgment Evaluations or assessments leading to gun ownership, nor do we fill out or file paperwork relevant to owning, concealing or purchasing a firearm.  Emotional Support     Animals (LUCIO): We do not provide documentation, including letters, to aid in the acclamation that an Emotional Support Animal is required. Note that ESAs are not trained to perform tasks or recognize particular signs or symptoms. Rather, they are distinguished by the close, emotional, and supportive bond between the animal and the owner.       SAMPLES: We do not provide samples of any medications. If you have financial difficulties and are on a limited income, you may qualify for Patient Assistance Programs from various pharmaceutical companies. This will require that you complete paperwork with your financial information, but this does not guarantee that the company will approve the application. Alternative medication options can be discussed. Some companies offer vouchers or coupons for discounts.    REFERRALS/COORDINATION: You will be referred to other providers if we feel unable to adequately diagnose or treat your particular condition, or if collaboration with another provider would allow for better management of your condition.    DR. BELTRAN'S SCHEDULE/HOURS:    Monday 7:30 - 12:00; 1-4:30 Tuesday 7:30 - 12:00; 1-5:30 Wednesday 7:30 - 12:00; 1-5:30 Thursday 7:30 - 12:00; 1-5:30 Friday 7:30 - " 12:30     Call In if problems  Call Report Side Effects   Encouraged to follow up with primary care / Gen Med MD for continued monitoring of general health and wellness  Call 911 Or go to ER if Acute Concerns (especially if any thoughts of harm to self or other)          Mariam Young, PhD, MP  Advanced Practice Medical Psychologist  [facility]  [address] [facility].  [place] [address]  [phone]   [phone] fax

## 2025-05-12 NOTE — PROGRESS NOTES
Outpatient Psychiatry Follow-Up Visit    5/12/2025    Virtual Visit    The patient location is: Patient's home/ Patient reported that his/her location at the time of this visit was in the [facility]     Visit type: Virtual visit with synchronous audio and video     Each patient to whom he or she provides medical services by telehealth is: (1) informed of the relationship between the medical psychologist and patient and the respective role of any other health care provider with respect to management of the patient; and (2) notified that he or she may decline to receive medical services by telehealth and may withdraw from such care at any time.    I also informed patient of the following:   Mariam Young, PhD, MPAP:  [place] medical license number: [id number]    My contact info:  [facility] / 2nd Floor  [address] [place] [address]   Ph: [phone]    If technology issues, call office phone: Ph: [phone] or [phone]  If crisis: Dial 911 or go to nearest Emergency Room (ER)  If questions related to privacy practices: contact [facility] Information Department: [phone]    Preferred Name: Yolanda  Gender Identity: cis female  Preferred Pronouns: she/her      History of Present Illness    CHIEF COMPLAINT:  Yolanda presents for a follow-up visit to discuss recent psychiatric hospitalization experiences and medication management.    HPI:  Yolanda reports a distressing experience during a recent psychiatric hospitalization. She describes multiple incidents, including having two seizures for which she was not taken to the hospital despite her requests. Yolanda also mentions fighting with staff all night, resulting in being given a shot to calm her down. She expresses frustration with the lack of proper care and communication during her stay. She denied any current suicidal thoughts or unusual perceptual experiences/psychosis.    Yolanda's current medication regimen  includes Cymbalta, Haldol, Lamotrigine, Lunesta, and Xanax. She has not taken the lamotrigine, however, since her discharge; the lamotrigine dosage is being restarted at 25mg with a plan to increase to 100mg in the morning for the first week, then twice daily thereafter.    Yolanda mentions having a psychotic episode during her hospitalization, which led to conflict with the nursing staff. She reports that a nurse quit following an incident involving her, which she learned about through staff and patient discussions.    Yolanda mentions having lupus and discusses a potential cataract surgery. She expresses concern about how her seizures might affect her eligibility for the surgery.    Yolanda inquires about how to file a formal complaint regarding her hospitalization experience and requests assistance in obtaining her medical records to verify documentation of the incidents she described.    MEDICATIONS:  Yolanda is on Cymbalta 60 mg twice daily and Haldol 5 mg twice daily, both taken orally. She is starting back on Lamotrigine 25 mg orally. She takes Lunesta 1 mg orally at bedtime as needed, and Xanax 1 mg orally as needed, with 30 tablets prescribed for the month.    SURGICAL HISTORY:  Yolanda has an upcoming cataract surgery and is inquiring about potential contraindications due to her seizures.      ROS:  Constitutional: +fatigue  Gastrointestinal: +constipation  Neurological: +seizures  Psychiatric: +hallucinations, +delusions       PSYCHIATRIC: Pertinant items are noted in the narrative.    Past Medical, Family and Social History: The patient's past medical, family and social history have been reviewed and updated as appropriate within the electronic medical record - see encounter notes.          5/12/2025     8:32 AM 4/28/2025     8:22 AM 4/11/2025     7:22 AM   GAD7   1. Feeling nervous, anxious, or on edge? 3 3 1   2. Not being able to stop or control worrying? 3 2 3   3. Worrying too much about different things? 3 3 3  "  4. Trouble relaxing? 3 3 3   5. Being so restless that it is hard to sit still? 3 3 3   6. Becoming easily annoyed or irritable? 3 3 3   7. Feeling afraid as if something awful might happen? 3 3 3   8. If you checked off any problems, how difficult have these problems made it for you to do your work, take care of things at home, or get along with other people? 3 2 3   JOANN-7 Score 21  20  19        Patient-reported      0-4 = Minimal anxiety  5-9 = Mild anxiety  10-14 = Moderate anxiety  15-21 = Severe anxiety     Risk Parameters:  Patient reports no suicidal ideation  Patient reports no homicidal ideation  Patient reports no self-injurious behavior  Patient reports no violent behavior    Exam (detailed: at least 9 elements; comprehensive: all 15 elements)   Constitutional  Vitals:  Most recent vital signs were reviewed.   Last 3 sets of Vitals        4/26/2025     9:39 AM 4/28/2025    12:38 PM 4/28/2025     1:49 PM   Vitals - 1 value per visit   SYSTOLIC 116 115    DIASTOLIC 76 72    Pulse 84 101    Temp 98.7 °F (37.1 °C) 98.7 °F (37.1 °C)    Resp 16 18    SPO2 98 % 96 %    Weight (lb) 196  195.99   Weight (kg) 88.905  88.9   Height 4' 8" (1.422 m)  4' 8" (1.422 m)   BMI (Calculated) 44  44          General:  age appropriate, casually dressed, neatly groomed, wearing glasses     Musculoskeletal  Muscle Strength/Tone:  no tremor, no tic   Gait & Station:  video visit     Psychiatric  Speech:  no latency; no press   Behavior: wnl   Mood & Affect:  Agitated about medicine confusion and lack of response  congruent and appropriate   Thought Process:  normal and logical   Associations:  intact   Thought Content:  normal, no suicidality, no homicidality, delusions, or paranoia, denied since hospitalization   Insight:  has awareness of illness   Judgement: behavior is adequate to circumstances   Orientation:  grossly intact   Memory: intact for content of interview   Language: grossly intact   Attention Span & " Concentration:  Grossly intact   Fund of Knowledge:  intact and appropriate to age and level of education     General Impression:       ICD-10-CM ICD-9-CM   1. Schizoaffective disorder, bipolar type  F25.0 295.70   2. Generalized anxiety disorder  F41.1 300.02   3. Insomnia due to other mental disorder  F51.05 300.9    F99 327.02        Assessment & Plan    Considered recent hospitalization and reported issues with seizures and psychiatric symptoms.  Addressed concerns about potential cataract surgery in relation to seizure history.    PLAN SUMMARY:   Provider review of medical records from recent hospitalization   Restart Lamotrigine 25 mg daily, increase to 100 mg AM for first week, then twice daily next month   Continue Cymbalta 60 mg twice daily   Continue Xanax 1 mg as needed, limited to 30 tablets/month and only as needed rather than daily   Continue Lunesta 1 mg as needed at bedtime   Continue Haldol 5 mg twice daily   Expressed desire to file complaint about recent hospitalization experience   Staff member to provide information on filing hospital complaint   Follow up on the 19th as scheduled    F25.0 SCHIZOAFFECTIVE DISORDER, BIPOLAR TYPE:   Continue Haldol 5 mg twice daily.   Continued current medication regimen with adjustments to Lamotrigine dosage.   Restarted Lamotrigine 25 mg daily, with instructions to increase to 100 mg in the morning for the first week, then twice daily thereafter in the following month.    F41.1 GENERALIZED ANXIETY DISORDER:   Continue Cymbalta 60 mg twice daily.   Continue Xanax 1 mg as needed, limited to 30 tablets per month.   Instructed patient not to take automatically, only if absolutely necessary.    F51.05 INSOMNIA DUE TO ANOTHER CONDITION:   Continue Lunesta 1 mg as needed at bedtime.    FOLLOW-UP:   Yolanda to request medical records directly from medical records if needed.   Staff member to contact patient with information on how to file a complaint should she desire to  do so.   Follow up on the 19th as scheduled.         I spent an additional 42 minutes performing E/M services with >50% spent on counseling, guidance, coordinating care (not Psychotherapy related) in addition to the 16 minutes performing Psychotherapy.    I spent a total of 58 minutes on the day of the visit. This includes face to face time and non-face to face time preparing to see the patient (eg, review of tests), obtaining and/or reviewing separately obtained history, documenting clinical information in the electronic or other health record, independently interpreting results and communicating results to the patient/family/caregiver, or care coordinator.        Mariam Young, PhD, MP  Advanced Practice Medical Psychologist  [facility] [address]  [place] [address]  [phone]   [phone] fax    This note was generated with the assistance of ambient listening technology. Verbal consent was obtained by the patient and accompanying visitor(s) for the recording of patient appointment to facilitate this note. I attest to having reviewed and edited the generated note for accuracy, though some syntax or spelling errors may persist. Please contact the author of this note for any clarification.

## 2025-05-15 ENCOUNTER — TELEPHONE (OUTPATIENT)
Dept: RHEUMATOLOGY | Facility: CLINIC | Age: 55
End: 2025-05-15
Payer: MEDICAID

## 2025-05-15 DIAGNOSIS — M45.9 ANKYLOSING SPONDYLITIS, UNSPECIFIED SITE OF SPINE: Primary | ICD-10-CM

## 2025-05-15 RX ORDER — TRAMADOL HYDROCHLORIDE 50 MG/1
50 TABLET, FILM COATED ORAL EVERY 8 HOURS PRN
Qty: 21 TABLET | Refills: 0 | Status: SHIPPED | OUTPATIENT
Start: 2025-05-15 | End: 2025-05-22

## 2025-05-15 NOTE — TELEPHONE ENCOUNTER
----- Message from Iniki sent at 5/15/2025  9:04 AM CDT -----  .Type: Patient Call BackWho called:patientWhat is the request in detail:  calling concerning needing to speak to someone regarding being miserable because of pain. Patient stated her pain level is a 12. Please call back Can the clinic reply by [facility]?   Would the patient rather a call back or a response via [facility] call back number:  .[phone]

## 2025-05-15 NOTE — TELEPHONE ENCOUNTER
"Spoke w/ pt and informed her per Dr. PARKER,     "Please recommend taking tramadol for acute pain . Hoping that the Taltz starts working soon. Thanks. "    Pt verbalized understanding and was grateful for the call.     "

## 2025-05-15 NOTE — TELEPHONE ENCOUNTER
Please recommend taking tramadol for acute pain . Hoping that the Taltz starts working soon. Thanks.

## 2025-05-15 NOTE — TELEPHONE ENCOUNTER
"Called pt to notify her that Dr. PARKER did prescribe tramadol for acute pain and that it was sent to [facility], while the patient and I were on the phone I did notice that she did not sound right. Her words were slurring, she sounded confused. Patient and I got off the phone and I tried to call right back and it went straight to . I then, called patient's daughter Romina to notify her of what transpired on the phone and to see if she could check on her mom. Romina, pt's daughter verbalized understanding and she stated "I will call her and check on her." Pt's daughter also verbalized " That she will give us a call once she checks on her mom" Pt's daughter was grateful for letting her know.   "

## 2025-05-16 ENCOUNTER — TELEPHONE (OUTPATIENT)
Dept: PSYCHIATRY | Facility: CLINIC | Age: 55
End: 2025-05-16
Payer: MEDICAID

## 2025-05-16 ENCOUNTER — TELEPHONE (OUTPATIENT)
Dept: NEUROLOGY | Facility: CLINIC | Age: 55
End: 2025-05-16
Payer: MEDICAID

## 2025-05-16 NOTE — TELEPHONE ENCOUNTER
"Called patient to see if she is on her way to her EMU admission this morning, 11am. She replied, "No, I just got out of the [facility]".   "

## 2025-05-16 NOTE — TELEPHONE ENCOUNTER
Called pt to confirm appt pt stated that she was waiting on a referral from Dr. Young for a . I told the pt to inform Dr. Young of her referral on her visit on 05/19/2025

## 2025-05-17 ENCOUNTER — NURSE TRIAGE (OUTPATIENT)
Dept: ADMINISTRATIVE | Facility: CLINIC | Age: 55
End: 2025-05-17
Payer: MEDICAID

## 2025-05-17 ENCOUNTER — ON-DEMAND VIRTUAL (OUTPATIENT)
Dept: URGENT CARE | Facility: CLINIC | Age: 55
End: 2025-05-17
Payer: MEDICAID

## 2025-05-17 DIAGNOSIS — B37.0 ORAL THRUSH: ICD-10-CM

## 2025-05-17 DIAGNOSIS — R21 RASH: Primary | ICD-10-CM

## 2025-05-17 PROCEDURE — 98004 SYNCH AUDIO-VIDEO EST SF 10: CPT | Mod: 95,,, | Performed by: NURSE PRACTITIONER

## 2025-05-17 RX ORDER — FLUCONAZOLE 150 MG/1
150 TABLET ORAL DAILY
Qty: 3 TABLET | Refills: 0 | Status: SHIPPED | OUTPATIENT
Start: 2025-05-17 | End: 2025-05-22

## 2025-05-17 NOTE — TELEPHONE ENCOUNTER
"Patient states c/o Mouth Ulcers caused by her new medication for Ankylosing Spondylitis. Patient states medication is called Taltz.     CARE ADVICE given per Mouth Ulcers (Adult) guideline. Patient advised to visit an Urgent Care Center or Schedule an [facility] visit for evaluation/treatment. Patient also advised to contact the [facility] for any worsening symptoms or questions. Patient states understanding of care advice.     Reason for Disposition   Large blisters in mouth (i.e., fluid filled bubbles or sacs)    Additional Information   Negative: [1] Looks like fever blisters ("cold sore") AND [2] only on outer lip   Negative: Generalized skin rash from Chickenpox   Negative: Chemical in the mouth suspected cause of ulcers   Negative: [1] Drinking very little AND [2] dehydration suspected (e.g., no urine > 12 hours, very dry mouth, very lightheaded)   Negative: Generalized rash on body   Negative: Patient sounds very sick or weak to the triager    Protocols used: Mouth Ulcers-A-AH    "

## 2025-05-18 NOTE — PROGRESS NOTES
Subjective:      Patient ID: Yolanda Betts is a 55 y.o. female.    Vitals:  vitals were not taken for this visit.     Chief Complaint: Rash      Visit Type: TELE AUDIOVISUAL - This visit was conducted virtually based on  subjective information and limited objective exam    Present with the patient at the time of consultation: TELEMED PRESENT WITH PATIENT: None  LOCATION OF PATIENT: [place], [place]   Two patient identifiers used to verify patient- saying out date of birth and full name.       Past Medical History:   Diagnosis Date    Abnormal Pap smear of cervix     HPV genital warts    Anemia     Anxiety     Arthritis     Asthma 10/11/2016    Bipolar 1 disorder     Diabetes mellitus, type 2     Dyslipidemia associated with type 2 diabetes mellitus 05/13/2019    Dyspnea due to COVID-19 09/12/2024    General anesthetics causing adverse effect in therapeutic use     Genital warts     GERD (gastroesophageal reflux disease)     Herpes simplex virus (HSV) infection     Hyperlipidemia     Hypertension     Hypertension complicating diabetes 05/05/2019    Migraine with aura and without status migrainosus, not intractable 03/21/2016    Mild persistent asthma without complication 10/11/2016    Morbid obesity with body mass index (BMI) of 45.0 to 49.9 in adult 08/03/2017    Myoclonus     Obstructive sleep apnea     ALEN (obstructive sleep apnea)     2L per N/C q HS    Schizoaffective disorder, bipolar type 04/25/2019    Seasonal allergic rhinitis due to pollen 05/13/2019    Seizures     Type 2 diabetes mellitus with hyperglycemia, with long-term current use of insulin 12/21/2017    Type 2 diabetes mellitus with hyperglycemia, without long-term current use of insulin 12/21/2017     Past Surgical History:   Procedure Laterality Date    abcess removed right back      ANGIOGRAM, CORONARY, WITH LEFT HEART CATHETERIZATION N/A 9/27/2024    Procedure: Angiogram, Coronary, with Left Heart Cath;  Surgeon: Jaimie Wills MD;   Location: [facility] CATH LAB;  Service: Cardiology;  Laterality: N/A;    BELT ABDOMINOPLASTY  1996    BREAST SURGERY Bilateral 1996    Reduction    CARPAL TUNNEL RELEASE Bilateral 2023    Procedure: RELEASE, CARPAL TUNNEL;  Surgeon: Neville Roth MD;  Location: [facility] OR;  Service: Orthopedics;  Laterality: Bilateral;  bilateral carpal tunnel release     SECTION      X 2    COLONOSCOPY      COLONOSCOPY N/A 2018    Procedure: colonoscopy for iron deficiency anemia;  Surgeon: Frankie Sanchez MD;  Location: [facility] ENDO;  Service: Endoscopy;  Laterality: N/A;    COLONOSCOPY N/A 2018    Procedure: COLONOSCOPY;  Surgeon: Frankie Sanchez MD;  Location: [facility];  Service: Endoscopy;  Laterality: N/A;    COLONOSCOPY N/A 3/10/2023    Procedure: COLONOSCOPY;  Surgeon: Lalita Montes De Oca MD;  Location: [facility];  Service: Endoscopy;  Laterality: N/A;    COLONOSCOPY N/A 2024    Procedure: COLONOSCOPY;  Surgeon: Tara Og MD;  Location: [facility];  Service: Endoscopy;  Laterality: N/A;    EPIDURAL STEROID INJECTION INTO CERVICAL SPINE N/A 2023    Procedure: C6/7 IL ROCAEL RN IV Sedation;  Surgeon: Otto Phillips MD;  Location: [facility] PAIN MGT;  Service: Pain Management;  Laterality: N/A;    EPIDURAL STEROID INJECTION INTO CERVICAL SPINE N/A 2024    Procedure: C5/6 IL ROCAEL;  Surgeon: Otto Phillips MD;  Location: [facility] PAIN MGT;  Service: Pain Management;  Laterality: N/A;    ESOPHAGOGASTRODUODENOSCOPY N/A 3/10/2023    Procedure: EGD (ESOPHAGOGASTRODUODENOSCOPY);  Surgeon: Lalita Montes De Oca MD;  Location: [facility];  Service: Endoscopy;  Laterality: N/A;    HYSTERECTOMY      w/ BSO; hypermenorrhea    INJECTION OF ANESTHETIC AGENT AROUND MEDIAL BRANCH NERVES INNERVATING CERVICAL FACET JOINT Bilateral 2023    Procedure: Bilateral C5-7 MBB (diagnostic);  Surgeon: Otto Phillips MD;  Location: [facility] PAIN MGT;  Service: Pain Management;  Laterality: Bilateral;    MOUTH  "SURGERY  1996    OOPHORECTOMY      hyst/bso, hypermenorrhea    ROBOT-ASSISTED CHOLECYSTECTOMY USING DA DARI XI N/A 1/3/2020    Procedure: XI ROBOTIC CHOLECYSTECTOMY;  Surgeon: Damir Driscoll MD;  Location: [facility];  Service: General;  Laterality: N/A;    TOTAL REDUCTION MAMMOPLASTY      TUBAL LIGATION       Review of patient's allergies indicates:   Allergen Reactions    Codeine Itching    Hydromorphone Other (See Comments)     Can't wake up for long time if taken    Slow to wake up after surgery after receiving    Aleve [naproxen sodium]      Increases BP    Ibuprofen      Increases BP    Neuromuscular blockers, steroidal Hives     some    Pregabalin Itching    Versed [midazolam]     Latex, natural rubber Rash    Morphine Rash     itching    Norco [hydrocodone-acetaminophen] Itching, Rash and Hallucinations    Secobarbital sodium Rash     itching    Tylox [oxycodone-acetaminophen] Rash     Medications Ordered Prior to Encounter[1]  Family History   Problem Relation Name Age of Onset    Diabetes Mother      Hyperlipidemia Mother      Hypertension Mother      Asthma Mother      COPD Mother      Glaucoma Mother      Thyroid disease Mother      Anesthesia problems Mother          "almost had a cardiac arrest" , blood clots    Hypertension Father      Hyperlipidemia Father      Cancer Father          Brain, lung, liver, kidney    No Known Problems Sister      Hypertension Brother      Alcohol abuse Brother      Heart disease Maternal Grandmother      Hyperlipidemia Maternal Grandmother      Hypertension Maternal Grandmother      Cataracts Maternal Grandmother      Diabetes Maternal Grandmother      Heart disease Maternal Grandfather      Hyperlipidemia Maternal Grandfather      Hypertension Maternal Grandfather      Glaucoma Maternal Grandfather      Cancer Maternal Grandfather      Cataracts Maternal Grandfather      Macular degeneration Maternal Grandfather      Diabetes Maternal Grandfather      Heart disease " Paternal Grandmother      Hyperlipidemia Paternal Grandmother      Hypertension Paternal Grandmother      Cataracts Paternal Grandmother      Heart disease Paternal Grandfather      Hyperlipidemia Paternal Grandfather      Hypertension Paternal Grandfather      Cataracts Paternal Grandfather      Breast cancer Maternal Cousin      Breast cancer Maternal Cousin      Breast cancer Maternal Cousin      Breast cancer Maternal Cousin         Medications Ordered                [id number] - [place] - [id number]   [id number], [place] [address]    Telephone: [phone]   Fax: [phone]   Hours: Not open 24 hours                         E-Prescribed (1 of 1)              fluconazole (DIFLUCAN) 150 MG Tab    Sig: Take 1 tablet (150 mg total) by mouth once daily. for 3 doses       Start: 5/17/25     Quantity: 3 tablet Refills: 0                           Ohs Peq Odvv Intake    5/17/2025  6:53 PM CDT - Filed by Patient   What is your current physical address in the event of a medical emergency? [address] [place] [address] [address] [place] [address]   Are you able to take your vital signs? No   Please attach any relevant images or files    Is your employer contracted with [facility]? No         54 yo female c/o Mouth Ulcers, white patches caused by her new medication for Ankylosing Spondylitis. Patient states medication is called Taltz. She reports similar symptoms in the past alleviated with po diflucan, declines nystatin. Pt denies SOB, fever and wheezing.               HENT:  Positive for mouth sores.    Respiratory:  Negative for shortness of breath and wheezing.         Objective:   The physical exam was conducted virtually.    AAO x 3 ; no acute distress noted; appearance normal; mood and behavior normal; thought process normal  Head- normocephalic  Nose- appears normal, no discharge or erythema  Eyes- pupils appear normal in size, no drainage, no erythema  Ears- normal appearing; no  discharge, no erythema  Mouth- appears normal  Oropharynx- no erythema, lesions  Lungs- breathing at a normal rate, no acute distress noted  Heart- no reports of tachycardia, palpitations, chest pain  Abdomen- non distended, non tender reported by patient  Skin- white patches to oral mucosa (reported per patient)       Assessment:     1. Rash    2. Oral thrush        Plan:         Thank you for choosing [facility]!    Our goal in the [facility] is to always provide outstanding medical care. You may receive a survey by mail or e-mail in the next week regarding your experience today. We would greatly appreciate you completing and returning the survey. Your feedback provides us with a way to recognize our staff who provide very good care, and it helps us learn how to improve when your experience was below our aspiration of excellence.         We appreciate you trusting us with your medical care. We hope you feel better soon. We will be happy to take care of you for all of your future medical needs.    You must understand that you've received an Urgent Care treatment only and that you may be released before all your medical problems are known or treated. You, the patient, will arrange for follow up care as instructed.    Follow up with your PCP or specialty clinic as directed in the next 1-2 weeks if not improved or as needed.  You can call [phone] to schedule an appointment with the appropriate provider.    If your condition worsens we recommend that you receive another evaluation in person, with your primary care provider, urgent care or at the emergency room immediately or contact your primary medical clinics after hours call service to discuss your concerns.         Rash    Oral thrush    Other orders  -     fluconazole (DIFLUCAN) 150 MG Tab; Take 1 tablet (150 mg total) by mouth once daily. for 3 doses  Dispense: 3 tablet; Refill: 0                        [1]   Current  "Outpatient Medications on File Prior to Visit   Medication Sig Dispense Refill    albuterol (PROVENTIL) 2.5 mg /3 mL (0.083 %) nebulizer solution Take 2.5 mg by nebulization every 4 (four) hours as needed. Times a day 75 mL 11    ALPRAZolam (XANAX) 1 MG tablet Take 1 tablet (1 mg total) by mouth daily as needed for Anxiety. 30 tablet 1    amLODIPine (NORVASC) 10 MG tablet Take 1 tablet (10 mg total) by mouth once daily. 30 tablet 11    atorvastatin (LIPITOR) 20 MG tablet Take 1 tablet (20 mg total) by mouth every evening 90 tablet 3    BD INSULIN SYRINGE ULTRA-FINE 1/2 mL 30 gauge x 1/2" Syrg   0    blood-glucose sensor (DEXCOM G6 SENSOR) Mariela change sensor every 10 days. 3 each 11    blood-glucose transmitter (DEXCOM G6 TRANSMITTER) Mariela 1 each by Misc.(Non-Drug; Combo Route) route every 3 (three) months. 1 each 3    cholecalciferol, vitamin D3, 1,250 mcg (50,000 unit) Tab Take 1 capsule by mouth every 7 days. 12 tablet 3    cyclobenzaprine (FLEXERIL) 10 MG tablet Take 1 tablet (10 mg total) by mouth 3 (three) times daily as needed (Pain). 90 tablet 11    DULoxetine (CYMBALTA) 60 MG capsule Take 1 capsule (60 mg total) by mouth 2 (two) times daily. 60 capsule 1    ergocalciferol (ERGOCALCIFEROL) 50,000 unit Cap Take 1 capsule (50,000 Units total) by mouth every 7 days. 4 capsule 6    estradioL (ESTRACE) 0.01 % (0.1 mg/gram) vaginal cream Place 1 g vaginally twice a week. 42.5 g 3    eszopiclone (LUNESTA) 1 MG Tab Take 1 tablet (1 mg total) by mouth at bedtime as needed 30 tablet 1    fenofibrate (TRICOR) 145 MG tablet Take 1 tablet by mouth in the morning. 30 tablet 5    fluticasone propionate (FLONASE) 50 mcg/actuation nasal spray Take 1 spray by Each Nostril route in the morning. 16 g 3    folic acid (FOLVITE) 1 MG tablet Take 1 tablet by mouth every day in the morning. 30 tablet 5    furosemide (LASIX) 20 MG tablet Take 1 tablet (20 mg total) by mouth once daily. 90 tablet 3    glucagon (BAQSIMI) 3 mg/actuation " "[place] SPRAY 1 SPRAY IN NOSTRIL AS NEEDED FOR EMERGENCY. MAY REPEAT 1 TIME AFTER 15 MINUTES 2 each 1    haloperidoL (HALDOL) 5 MG tablet Take 1 tablet (5 mg total) by mouth 2 (two) times daily. 60 tablet 0    insulin aspart U-100 (NOVOLOG FLEXPEN U-100 INSULIN) 100 unit/mL (3 mL) InPn pen Inject 9 Units into the skin 3 (three) times daily before meals. Use sliding scale for small medium and large meals 15 mL 3    insulin aspart U-100 (NOVOLOG U-100 INSULIN ASPART) 100 unit/mL injection To use via insulin pump. Up to 200 units every 2 days 30 mL 5    insulin glargine U-100, Lantus, (LANTUS SOLOSTAR U-100 INSULIN) 100 unit/mL (3 mL) InPn pen Inject 16 Units into the skin once daily. Use in the event of pump failure 15 mL 3    insulin pump cart,auto,BT,G6/7 (OMNIPOD 5 G6-G7 PODS, GEN 5,) Crtg 1 each by Misc.(Non-Drug; Combo Route) route every 48 hours. 45 each 3    insulin syringe-needle U-100 0.3 mL 30 gauge x 5/16" Syrg 1 each by Misc.(Non-Drug; Combo Route) route Every 3 (three) days. 100 each 2    ixekizumab (TALTZ AUTOINJECTOR) 80 mg/mL AtIn Inject 160 mg into the skin once, followed by 80 mg every 4 weeks. 4 mL 1    [START ON 6/9/2025] lamoTRIgine (LAMICTAL) 100 MG tablet Take 1 tablet (100 mg total) by mouth 2 (two) times daily. 60 tablet 1    lamoTRIgine (LAMICTAL) 25 MG tablet Take 1 tablet (25 mg total) by mouth every morning for 14 days, THEN 2 tablets (50 mg total) every morning for 14 days. 42 tablet 0    levETIRAcetam (KEPPRA) 500 MG Tab Take 1.5 tablets (750 mg total) by mouth 2 (two) times daily. 90 tablet 0    levocetirizine (XYZAL) 5 MG tablet Take 1 tablet (5 mg total) by mouth every evening. 30 tablet 11    LIDOcaine (LIDODERM) 5 % Place 2 patches onto the skin every 12 (twelve) hours as needed (pain). Remove & Discard patch within 12 hours or as directed by MD 60 patch 11    lifitegrast (XIIDRA) 5 % Dpet Place 1 drop into both eyes 2 (two) times daily. 60 each 12    linaCLOtide (LINZESS) 145 mcg Cap " capsule Take 1 capsule (145 mcg total) by mouth daily as needed (for constipation). 30 capsule 11    linaCLOtide (LINZESS) 290 mcg Cap capsule Take 1 capsule (290 mcg total) by mouth before breakfast. 30 capsule 3    magnesium oxide (MAG-OX) 400 mg (241.3 mg magnesium) tablet Take 1 tablet (400 mg total) by mouth once daily. 90 tablet 3    melatonin (MELATIN) 3 mg tablet Take 2 tablets (6 mg total) by mouth nightly. 180 tablet 0    metoprolol succinate (TOPROL-XL) 50 MG 24 hr tablet Take 1 tablet (50 mg total) by mouth every morning. 30 tablet 11    omeprazole (PRILOSEC) 40 MG capsule Take 1 capsule (40 mg total) by mouth once daily. 90 capsule 1    ondansetron (ZOFRAN) 4 MG tablet Take 4 mg by mouth every 6 (six) hours.      OZEMPIC 2 mg/dose (8 mg/3 mL) PnIj Inject 2 mg into the skin every 7 days.      traMADoL (ULTRAM) 50 mg tablet Take 1 tablet (50 mg total) by mouth every 8 (eight) hours as needed for Pain. 21 tablet 0    [DISCONTINUED] clonazePAM (KLONOPIN) 1 MG tablet Take 1 tablet by mouth daily 30 tablet 0    [DISCONTINUED] glucagon, human recombinant, (GLUCAGON EMERGENCY KIT, HUMAN,) 1 mg SolR Inject 1 mg into the muscle as needed. Emergency use 1 each 1    [DISCONTINUED] valsartan (DIOVAN) 320 MG tablet Take 1 tablet (320 mg total) by mouth once daily. 90 tablet 3     No current facility-administered medications on file prior to visit.

## 2025-05-19 ENCOUNTER — OFFICE VISIT (OUTPATIENT)
Dept: PSYCHIATRY | Facility: CLINIC | Age: 55
End: 2025-05-19
Payer: MEDICAID

## 2025-05-19 ENCOUNTER — PATIENT MESSAGE (OUTPATIENT)
Dept: PSYCHIATRY | Facility: CLINIC | Age: 55
End: 2025-05-19
Payer: MEDICAID

## 2025-05-19 DIAGNOSIS — F25.0 SCHIZOAFFECTIVE DISORDER, BIPOLAR TYPE: Primary | Chronic | ICD-10-CM

## 2025-05-19 DIAGNOSIS — F41.1 GENERALIZED ANXIETY DISORDER: Chronic | ICD-10-CM

## 2025-05-19 PROCEDURE — 90863 PHARMACOLOGIC MGMT W/PSYTX: CPT | Mod: HB,95,SA, | Performed by: PSYCHOLOGIST

## 2025-05-19 PROCEDURE — 90832 PSYTX W PT 30 MINUTES: CPT | Mod: HB,95,SA, | Performed by: PSYCHOLOGIST

## 2025-05-19 PROCEDURE — 1159F MED LIST DOCD IN RCRD: CPT | Mod: CPTII,HB,95, | Performed by: PSYCHOLOGIST

## 2025-05-19 PROCEDURE — 3052F HG A1C>EQUAL 8.0%<EQUAL 9.0%: CPT | Mod: CPTII,HB,95, | Performed by: PSYCHOLOGIST

## 2025-05-19 PROCEDURE — 3072F LOW RISK FOR RETINOPATHY: CPT | Mod: CPTII,HB,95, | Performed by: PSYCHOLOGIST

## 2025-05-19 NOTE — PATIENT INSTRUCTIONS

## 2025-05-19 NOTE — PROGRESS NOTES
"Outpatient Psychiatry Follow-Up Visit    5/19/2025    Virtual Visit    The patient location is: Patient's home/ Patient reported that his/her location at the time of this visit was in the [facility]     Visit type: Virtual visit with synchronous audio and video     Each patient to whom he or she provides medical services by telehealth is: (1) informed of the relationship between the medical psychologist and patient and the respective role of any other health care provider with respect to management of the patient; and (2) notified that he or she may decline to receive medical services by telehealth and may withdraw from such care at any time.    I also informed patient of the following:   Mariam Young, PhD, MPAP:  [place] medical license number: [id number]    My contact info:  [facility] / 2nd Floor  [address] [place] [address]   Ph: [phone]    If technology issues, call office phone: Ph: [phone] or [phone]  If crisis: Dial 911 or go to nearest Emergency Room (ER)  If questions related to privacy practices: contact [facility] Information Department: [phone]    Preferred Name: Yolanda  Gender Identity: cis female  Preferred Pronouns: she/her      History of Present Illness    CHIEF COMPLAINT:  Yolanda presents for follow-up two weeks after the previous visit to discuss medication management for anxiety, depression, and sleep issues.    HPI:  Yolanda reports excessive sleepiness since starting new medications, stating "I'm sleeping a lot." If she takes her medicine in the morning, she sleeps until 2:00 or 3:00 in the afternoon. This significant increase in sleep duration was not present during the previous visit two weeks ago.    Yolanda acknowledges feeling depressed, stating "I am crying every day." She is currently in the process of titrating her Lamotrigine dose, having been on 25mg for about 7 days, with 7 more days to go before increasing to " "50mg.    Yolanda mentions using Xanax as needed for anxiety. She reports using it the previous day when her "nerves were really shot," describing having "pity pills" and thoughts of "woe is me" that she could not get out of her mind.    Yolanda reports experiencing several side effects, including jitteriness, hand shaking, dry mouth, and headaches. She also mentions being "very sleepy" and feeling drowsy to the point where she does not know what to do.    Yolanda has noticed weight gain since returning from the hospital. She reports her current weight as 215 lbs and mentions being hungry all the time, including waking up at night to eat.    Yolanda mentions dealing with a leaking toilet in her house that has affected her furniture. She is planning to use a storage pod to store her belongings but has not set a date for this yet.    Yolanda had called on Friday about a referral for therapy. She is now on Medicare as her primary insurance and needs to find a provider that accepts both Medicare and Medicaid.    Yolanda denies experiencing hallucinations, bug visions, or other psychotic symptoms.    MEDICATIONS:  Yolanda is on Haldol 5 mg twice daily orally for psychiatric symptoms, which is causing drowsiness and possible weight gain. Her dosage has been decreased to 2.5 mg twice daily. She is also on Lamotrigine 25 mg daily orally for 7 days, then increasing to 50 mg for 14 days, for depression. Xanax is taken as needed for anxiety. Cymbalta is taken (prescribed by another provider).    LIFESTYLE:  Yolanda is currently experiencing issues with a leaking toilet in her house. She is planning to use a storage pod in the yard to store her belongings.      ROS:  Constitutional: +weight gain, +excessive drowsiness, +increased appetite  Head: +headaches  ENT: +dry mouth  Gastrointestinal: +constipation  Neurological: +tremors  Psychiatric: +depression, +anxiety, +excessive crying, +nervousness       PSYCHIATRIC: Pertinant items are noted in the " "narrative.    Past Medical, Family and Social History: The patient's past medical, family and social history have been reviewed and updated as appropriate within the electronic medical record - see encounter notes.     since May 2025.            5/19/2025     8:40 AM 5/12/2025     8:32 AM 4/28/2025     8:22 AM   GAD7   1. Feeling nervous, anxious, or on edge? 3 3 3   2. Not being able to stop or control worrying? 3 3 2   3. Worrying too much about different things? 3 3 3   4. Trouble relaxing? 3 3 3   5. Being so restless that it is hard to sit still? 3 3 3   6. Becoming easily annoyed or irritable? 3 3 3   7. Feeling afraid as if something awful might happen? 3 3 3   8. If you checked off any problems, how difficult have these problems made it for you to do your work, take care of things at home, or get along with other people? 3 3 2   JOANN-7 Score 21  21  20        Patient-reported      0-4 = Minimal anxiety  5-9 = Mild anxiety  10-14 = Moderate anxiety  15-21 = Severe anxiety       Risk Parameters:  Patient reports no suicidal ideation  Patient reports no homicidal ideation  Patient reports no self-injurious behavior  Patient reports no violent behavior    Exam (detailed: at least 9 elements; comprehensive: all 15 elements)   Constitutional  Vitals:  Most recent vital signs were reviewed.   Last 3 sets of Vitals        4/26/2025     9:39 AM 4/28/2025    12:38 PM 4/28/2025     1:49 PM   Vitals - 1 value per visit   SYSTOLIC 116 115    DIASTOLIC 76 72    Pulse 84 101    Temp 98.7 °F (37.1 °C) 98.7 °F (37.1 °C)    Resp 16 18    SPO2 98 % 96 %    Weight (lb) 196  195.99   Weight (kg) 88.905  88.9   Height 4' 8" (1.422 m)  4' 8" (1.422 m)   BMI (Calculated) 44  44          General:  age appropriate, casually dressed, neatly groomed, shower/sleep cap over hair     Musculoskeletal  Muscle Strength/Tone:  no tremor, no tic, reported--not observed--shaking/jittery reported   Gait & Station:  video visit "     Psychiatric  Speech:  no latency; no press   Behavior: Very sleepy; initially on toilet in bathroom   Mood & Affect:  Tried/sleepy  congruent and appropriate, brighter at end of visit--smiled   Thought Process:  normal and logical   Associations:  intact   Thought Content:  normal, no suicidality, no homicidality, delusions, or paranoia   Insight:  has awareness of illness   Judgement: behavior is adequate to circumstances   Orientation:  grossly intact   Memory: intact for content of interview   Language: grossly intact   Attention Span & Concentration:  Grossly intact   Fund of Knowledge:  intact and appropriate to age and level of education     General Impression:       ICD-10-CM ICD-9-CM   1. Schizoaffective disorder, bipolar type  F25.0 295.70   2. Generalized anxiety disorder  F41.1 300.02        Assessment & Plan    Assessed response to current medication regimen, noting increased drowsiness and weight gain.  Decreased Haldol from 5 mg twice daily to 2.5 mg twice daily (half tablet morning and evening, approximately 10-12 hours apart) to address side effects of drowsiness, jitteriness, and weight gain.  Evaluated sleep patterns and determined Lunesta is no longer necessary due to excessive daytime sleepiness.  Considered potential causes of depression, including recent initiation of Lamotrigine titration.  Assessed for presence of hallucinations or other psychotic symptoms, which were denied.    PLAN SUMMARY:   Continue Lamotrigine titration as prescribed (25 mg daily, increase to 50 mg after 14 days)   Decrease Haldol from 5 mg twice daily to 2.5 mg twice daily   Continue Xanax as needed for anxiety   Continue Cymbalta at current dose   Discontinue Lunesta   Recommend increased water intake and physical activity for constipation management   Virtual follow-up visit in 2 weeks (afternoon)    SCHIZOAFFECTIVE DISORDER, BIPOLAR TYPE/GENERALIZED ANXIETY DISORDER:   Explained that some medications,  particularly Haldol and Cymbalta, may potentially cause weight gain.   Decreased Haldol from 5 mg twice daily to 2.5 mg twice daily (half tablet morning and evening, approximately 10-12 hours apart) to address side effects of drowsiness, jitteriness, and weight gain.   Discussed the importance of movement and hydration in managing constipation.   Recommend moving around during the day to help with energy levels and constipation.   Recommend drinking plenty of water to help with constipation.    MEDICATION MANAGEMENT:   Discontinued Lunesta.   Continued Lamotrigine titration as previously prescribed (currently on 25 mg daily, to increase to 50 mg daily after 14 days).   Continued Cymbalta at current dose (prescribed by another provider).   Continued Xanax as needed for anxiety.   Contact the office if running out of medication before next refill is due.    FOLLOW-UP CARE:   Follow up in 2 weeks for virtual visit in the afternoon.         I spent an additional 17 minutes performing E/M services with >50% spent on counseling, guidance, coordinating care (not Psychotherapy related) in addition to the 16 minutes performing Psychotherapy.    I spent a total of 33 minutes on the day of the visit. This includes face to face time and non-face to face time preparing to see the patient (eg, review of tests), obtaining and/or reviewing separately obtained history, documenting clinical information in the electronic or other health record, independently interpreting results and communicating results to the patient/family/caregiver, or care coordinator.        Mariam Young, PhD, MP  Advanced Practice Medical Psychologist  [facility] [address]  [place] [address]  [phone]   [phone] fax    This note was generated with the assistance of ambient listening technology. Verbal consent was obtained by the patient and accompanying visitor(s) for the recording of patient appointment to  facilitate this note. I attest to having reviewed and edited the generated note for accuracy, though some syntax or spelling errors may persist. Please contact the author of this note for any clarification.

## 2025-05-20 ENCOUNTER — OFFICE VISIT (OUTPATIENT)
Dept: HEMATOLOGY/ONCOLOGY | Facility: CLINIC | Age: 55
End: 2025-05-20
Payer: MEDICAID

## 2025-05-20 DIAGNOSIS — Z79.4 TYPE 2 DIABETES MELLITUS WITH HYPERGLYCEMIA, WITH LONG-TERM CURRENT USE OF INSULIN: Chronic | ICD-10-CM

## 2025-05-20 DIAGNOSIS — E11.42 DM TYPE 2 WITH DIABETIC PERIPHERAL NEUROPATHY: ICD-10-CM

## 2025-05-20 DIAGNOSIS — D50.8 IRON DEFICIENCY ANEMIA SECONDARY TO INADEQUATE DIETARY IRON INTAKE: ICD-10-CM

## 2025-05-20 DIAGNOSIS — E11.42 DIABETIC POLYNEUROPATHY ASSOCIATED WITH TYPE 2 DIABETES MELLITUS: Primary | Chronic | ICD-10-CM

## 2025-05-20 DIAGNOSIS — K21.9 GASTROESOPHAGEAL REFLUX DISEASE, UNSPECIFIED WHETHER ESOPHAGITIS PRESENT: ICD-10-CM

## 2025-05-20 DIAGNOSIS — E11.65 TYPE 2 DIABETES MELLITUS WITH HYPERGLYCEMIA, WITH LONG-TERM CURRENT USE OF INSULIN: Chronic | ICD-10-CM

## 2025-05-20 PROCEDURE — 3052F HG A1C>EQUAL 8.0%<EQUAL 9.0%: CPT | Mod: CPTII,95,, | Performed by: INTERNAL MEDICINE

## 2025-05-20 PROCEDURE — 1159F MED LIST DOCD IN RCRD: CPT | Mod: CPTII,95,, | Performed by: INTERNAL MEDICINE

## 2025-05-20 PROCEDURE — 1160F RVW MEDS BY RX/DR IN RCRD: CPT | Mod: CPTII,95,, | Performed by: INTERNAL MEDICINE

## 2025-05-20 PROCEDURE — 98002 SYNCH AUDIO-VIDEO NEW MOD 45: CPT | Mod: 95,,, | Performed by: INTERNAL MEDICINE

## 2025-05-20 PROCEDURE — 3072F LOW RISK FOR RETINOPATHY: CPT | Mod: CPTII,95,, | Performed by: INTERNAL MEDICINE

## 2025-05-20 RX ORDER — SODIUM FERRIC GLUCONATE COMPLEX IN SUCROSE 12.5 MG/ML
125 INJECTION INTRAVENOUS
Status: CANCELLED | OUTPATIENT
Start: 2025-05-20

## 2025-05-20 RX ORDER — HEPARIN 100 UNIT/ML
500 SYRINGE INTRAVENOUS
OUTPATIENT
Start: 2025-05-20

## 2025-05-20 RX ORDER — SODIUM CHLORIDE 0.9 % (FLUSH) 0.9 %
10 SYRINGE (ML) INJECTION
OUTPATIENT
Start: 2025-05-20

## 2025-05-20 RX ORDER — EPINEPHRINE 0.3 MG/.3ML
0.3 INJECTION SUBCUTANEOUS ONCE AS NEEDED
OUTPATIENT
Start: 2025-05-20

## 2025-05-20 NOTE — PROGRESS NOTES
"Subjective:       Patient ID: Yolanda Betts is a 55 y.o. female.    Chief Complaint: Results and Anemia    HPI:  57-year-old female referred for progressive anemia in intolerance of oral iron patient has Pica for ice patient having increasing fatigue and weakness.  Patient is undergoing GI evaluation over the last several years found to have gastropathy.  With 1 tubular adenoma.  Intolerant of oral iron nonresponsive    Past Medical History:   Diagnosis Date    Abnormal Pap smear of cervix     HPV genital warts    Anemia     Anxiety     Arthritis     Asthma 10/11/2016    Bipolar 1 disorder     Diabetes mellitus, type 2     Dyslipidemia associated with type 2 diabetes mellitus 05/13/2019    Dyspnea due to COVID-19 09/12/2024    General anesthetics causing adverse effect in therapeutic use     Genital warts     GERD (gastroesophageal reflux disease)     Herpes simplex virus (HSV) infection     Hyperlipidemia     Hypertension     Hypertension complicating diabetes 05/05/2019    Migraine with aura and without status migrainosus, not intractable 03/21/2016    Mild persistent asthma without complication 10/11/2016    Morbid obesity with body mass index (BMI) of 45.0 to 49.9 in adult 08/03/2017    Myoclonus     Obstructive sleep apnea     ALEN (obstructive sleep apnea)     2L per N/C q HS    Schizoaffective disorder, bipolar type 04/25/2019    Seasonal allergic rhinitis due to pollen 05/13/2019    Seizures     Type 2 diabetes mellitus with hyperglycemia, with long-term current use of insulin 12/21/2017    Type 2 diabetes mellitus with hyperglycemia, without long-term current use of insulin 12/21/2017     Family History   Problem Relation Name Age of Onset    Diabetes Mother      Hyperlipidemia Mother      Hypertension Mother      Asthma Mother      COPD Mother      Glaucoma Mother      Thyroid disease Mother      Anesthesia problems Mother          "almost had a cardiac arrest" , blood clots    Hypertension Father   "    Hyperlipidemia Father      Cancer Father          Brain, lung, liver, kidney    No Known Problems Sister      Hypertension Brother      Alcohol abuse Brother      Heart disease Maternal Grandmother      Hyperlipidemia Maternal Grandmother      Hypertension Maternal Grandmother      Cataracts Maternal Grandmother      Diabetes Maternal Grandmother      Heart disease Maternal Grandfather      Hyperlipidemia Maternal Grandfather      Hypertension Maternal Grandfather      Glaucoma Maternal Grandfather      Cancer Maternal Grandfather      Cataracts Maternal Grandfather      Macular degeneration Maternal Grandfather      Diabetes Maternal Grandfather      Heart disease Paternal Grandmother      Hyperlipidemia Paternal Grandmother      Hypertension Paternal Grandmother      Cataracts Paternal Grandmother      Heart disease Paternal Grandfather      Hyperlipidemia Paternal Grandfather      Hypertension Paternal Grandfather      Cataracts Paternal Grandfather      Breast cancer Maternal Cousin      Breast cancer Maternal Cousin      Breast cancer Maternal Cousin      Breast cancer Maternal Cousin       Social History[1]  Past Surgical History:   Procedure Laterality Date    abcess removed right back      ANGIOGRAM, CORONARY, WITH LEFT HEART CATHETERIZATION N/A 2024    Procedure: Angiogram, Coronary, with Left Heart Cath;  Surgeon: Jaimie Wills MD;  Location: [facility] CATH LAB;  Service: Cardiology;  Laterality: N/A;    BELT ABDOMINOPLASTY      BREAST SURGERY Bilateral     Reduction    CARPAL TUNNEL RELEASE Bilateral 2023    Procedure: RELEASE, CARPAL TUNNEL;  Surgeon: Neville Roth MD;  Location: [facility] OR;  Service: Orthopedics;  Laterality: Bilateral;  bilateral carpal tunnel release     SECTION      X 2    COLONOSCOPY      COLONOSCOPY N/A 2018    Procedure: colonoscopy for iron deficiency anemia;  Surgeon: Frankie Sanchez MD;  Location: [facility] ENDO;  Service: Endoscopy;   Laterality: N/A;    COLONOSCOPY N/A 2/28/2018    Procedure: COLONOSCOPY;  Surgeon: Frankie Sanchez MD;  Location: [facility] ENDO;  Service: Endoscopy;  Laterality: N/A;    COLONOSCOPY N/A 3/10/2023    Procedure: COLONOSCOPY;  Surgeon: Lalita Montes De Oca MD;  Location: [facility] ENDO;  Service: Endoscopy;  Laterality: N/A;    COLONOSCOPY N/A 4/4/2024    Procedure: COLONOSCOPY;  Surgeon: Tara Og MD;  Location: [facility] ENDO;  Service: Endoscopy;  Laterality: N/A;    EPIDURAL STEROID INJECTION INTO CERVICAL SPINE N/A 1/31/2023    Procedure: C6/7 IL ROCAEL RN IV Sedation;  Surgeon: Otto Phillips MD;  Location: [facility] PAIN MGT;  Service: Pain Management;  Laterality: N/A;    EPIDURAL STEROID INJECTION INTO CERVICAL SPINE N/A 1/30/2024    Procedure: C5/6 IL ROCAEL;  Surgeon: Otto Phillips MD;  Location: [facility] PAIN MGT;  Service: Pain Management;  Laterality: N/A;    ESOPHAGOGASTRODUODENOSCOPY N/A 3/10/2023    Procedure: EGD (ESOPHAGOGASTRODUODENOSCOPY);  Surgeon: Lalita Montes De Oca MD;  Location: [facility];  Service: Endoscopy;  Laterality: N/A;    HYSTERECTOMY      w/ BSO; hypermenorrhea    INJECTION OF ANESTHETIC AGENT AROUND MEDIAL BRANCH NERVES INNERVATING CERVICAL FACET JOINT Bilateral 12/19/2023    Procedure: Bilateral C5-7 MBB (diagnostic);  Surgeon: Otto Phillips MD;  Location: [facility] PAIN MGT;  Service: Pain Management;  Laterality: Bilateral;    MOUTH SURGERY  1996    OOPHORECTOMY      hyst/bso, hypermenorrhea    ROBOT-ASSISTED CHOLECYSTECTOMY USING DA DARI XI N/A 1/3/2020    Procedure: XI ROBOTIC CHOLECYSTECTOMY;  Surgeon: Damir Driscoll MD;  Location: [facility];  Service: General;  Laterality: N/A;    TOTAL REDUCTION MAMMOPLASTY      TUBAL LIGATION         Labs:  Lab Results   Component Value Date    WBC 6.52 05/05/2025    HGB 9.1 (L) 05/05/2025    HCT 31.2 (L) 05/05/2025    MCV 77 (L) 05/05/2025     05/05/2025     BMP  Lab Results   Component Value Date     05/05/2025    K 3.7 05/05/2025    CL  106 05/05/2025    CO2 24 05/05/2025    BUN 8 05/05/2025    CREATININE 0.8 05/05/2025    CALCIUM 9.1 05/05/2025    ANIONGAP 9 05/05/2025    ESTGFRAFRICA >60.0 03/08/2022    EGFRNONAA >60.0 03/08/2022     Lab Results   Component Value Date    ALT 35 05/05/2025    AST 23 05/05/2025    ALKPHOS 84 05/05/2025    BILITOT 0.2 05/05/2025       Lab Results   Component Value Date    IRON 48 04/17/2025    TIBC 519 (H) 04/17/2025    FERRITIN 28.0 04/17/2025     Lab Results   Component Value Date    SRVCXLCL44 426 03/31/2017     Lab Results   Component Value Date    FOLATE 9.6 04/29/2024     Lab Results   Component Value Date    TSH 1.703 04/12/2025         Review of Systems   Constitutional:  Positive for activity change, appetite change and fatigue. Negative for chills, diaphoresis, fever and unexpected weight change.   HENT:  Negative for congestion, dental problem, drooling, ear discharge, ear pain, facial swelling, hearing loss, mouth sores, nosebleeds, postnasal drip, rhinorrhea, sinus pressure, sneezing, sore throat, tinnitus, trouble swallowing and voice change.    Eyes:  Negative for photophobia, pain, discharge, redness, itching and visual disturbance.   Respiratory:  Negative for cough, choking, chest tightness, shortness of breath, wheezing and stridor.    Cardiovascular:  Negative for chest pain, palpitations and leg swelling.   Gastrointestinal:  Negative for abdominal distention, abdominal pain, anal bleeding, blood in stool, constipation, diarrhea, nausea, rectal pain and vomiting.   Endocrine: Negative for cold intolerance, heat intolerance, polydipsia, polyphagia and polyuria.   Genitourinary:  Negative for decreased urine volume, difficulty urinating, dyspareunia, dysuria, enuresis, flank pain, frequency, genital sores, hematuria, menstrual problem, pelvic pain, urgency, vaginal bleeding, vaginal discharge and vaginal pain.   Musculoskeletal:  Negative for arthralgias, back pain, gait problem, joint swelling,  myalgias, neck pain and neck stiffness.   Skin:  Negative for color change, pallor and rash.   Allergic/Immunologic: Negative for environmental allergies, food allergies and immunocompromised state.   Neurological:  Positive for weakness. Negative for dizziness, tremors, seizures, syncope, facial asymmetry, speech difficulty, light-headedness, numbness and headaches.   Hematological:  Negative for adenopathy. Does not bruise/bleed easily.   Psychiatric/Behavioral:  Positive for dysphoric mood. Negative for agitation, behavioral problems, confusion, decreased concentration, hallucinations, self-injury, sleep disturbance and suicidal ideas. The patient is not nervous/anxious and is not hyperactive.        Objective:      Physical Exam  Constitutional:       Appearance: Normal appearance. She is obese.   Neurological:      Mental Status: She is alert.             Assessment:      1. Diabetic polyneuropathy associated with type 2 diabetes mellitus    2. Iron deficiency anemia secondary to inadequate dietary iron intake    3. DM type 2 with diabetic peripheral neuropathy    4. Gastroesophageal reflux disease, unspecified whether esophagitis present           Med Onc Chart Routing      Follow up with physician . Return 3 months CBC CMP serum iron TIBC ferritin prior   Follow up with JONATHAN    Infusion scheduling note    Injection scheduling note IV iron x4   Labs   Scheduling:  Preferred lab:  Lab interval:  Patient will need CBC serum iron TIBC ferritin day of day 1 of intravenous iron   Imaging    Pharmacy appointment    Other referrals                 Plan:     The patient location is:  Home  The chief complaint leading to consultation is:  Anemia    Visit type: audiovisual    Face to Face time with patient: 45 minutes of total time spent on the encounter, which includes face to face time and non-face to face time preparing to see the patient (eg, review of tests), Obtaining and/or reviewing separately obtained history,  Documenting clinical information in the electronic or other health record, Independently interpreting results (not separately reported) and communicating results to the patient/family/caregiver, or Care coordination (not separately reported).         Each patient to whom he or she provides medical services by telemedicine is:  (1) informed of the relationship between the physician and patient and the respective role of any other health care provider with respect to management of the patient; and (2) notified that he or she may decline to receive medical services by telemedicine and may withdraw from such care at any time.    Notes:      Reviewed patient's chart documented iron deficiency.  Intolerant and nonresponsive to oral iron article from up-to-date followed her for review variant patient has had upper lower endoscopies in the last several years with a tubular adenoma on colonoscopies as well as some superficial gastritis.  Will proceed with intravenous iron see response before referring back for endoscopic evaluation article from up-to-date followed her for her review on diagnosis of iron deficiency    Hector Linton Jr, MD FACP         [1]   Social History  Socioeconomic History    Marital status: Single   Tobacco Use    Smoking status: Never     Passive exposure: Never    Smokeless tobacco: Never   Substance and Sexual Activity    Alcohol use: Not Currently     Comment: socially  No alcohol 72h prior to sx    Drug use: No    Sexual activity: Yes     Partners: Male     Birth control/protection: Surgical     Comment: hyst   Other Topics Concern    Patient feels they ought to cut down on drinking/drug use No    Patient annoyed by others criticizing their drinking/drug use No    Patient has felt bad or guilty about drinking/drug use No    Patient has had a drink/used drugs as an eye opener in the AM No   Social History Narrative    Long-term care nurse     Social Drivers of Health     Financial Resource Strain:  High Risk (2/20/2025)    Overall Financial Resource Strain (CARDIA)     Difficulty of Paying Living Expenses: Very hard   Food Insecurity: Food Insecurity Present (2/20/2025)    Hunger Vital Sign     Worried About Running Out of Food in the Last Year: Often true     Ran Out of Food in the Last Year: Often true   Transportation Needs: Unmet Transportation Needs (3/19/2025)    PRAPARE - Transportation     Lack of Transportation (Medical): Yes     Lack of Transportation (Non-Medical): Yes   Physical Activity: Insufficiently Active (2/20/2025)    Exercise Vital Sign     Days of Exercise per Week: 2 days     Minutes of Exercise per Session: 20 min   Stress: Stress Concern Present (2/20/2025)    [employer] - Occupational Stress Questionnaire     Feeling of Stress : Very much   Housing Stability: High Risk (2/20/2025)    Housing Stability Vital Sign     Unable to Pay for Housing in the Last Year: Yes     Number of Times Moved in the Last Year: 0     Homeless in the Last Year: No

## 2025-05-20 NOTE — PROGRESS NOTES
Subjective:       Patient ID: Yolanda Betts is a 55 y.o. female.  The patient location is: [place], [place]    Visit type: audiovisual-Synchronous      Face to Face time with patient: 11 min  20  minutes of total time spent on the encounter, which includes face to face time and non-face to face time preparing to see the patient (eg, review of tests), Obtaining and/or reviewing separately obtained history, Documenting clinical information in the electronic or other health record, Independently interpreting results (not separately reported) and communicating results to the patient/family/caregiver, or Care coordination (not separately reported).         Each patient to whom he or she provides medical services by telemedicine is:  (1) informed of the relationship between the physician and patient and the respective role of any other health care provider with respect to management of the patient; and (2) notified that he or she may decline to receive medical services by telemedicine and may withdraw from such care at any time.       History of Present Illness:   Yolanda Betts 55 y.o. female presents today with the following:   History of Present Illness    CHIEF COMPLAINT:  Ms. Betts presents today for psychiatric hospital follow-up.    PSYCHIATRIC HISTORY:  She was recently discharged from a psychiatric hospital in [place] where her medications were adjusted. She has a history of bipolar disorder, schizoaffective disorder with visual hallucinations, generalized anxiety disorder, and insomnia.    MEDICATION:  She has questions regarding Lunesta and Aldactone medications.        Past Medical History:   Diagnosis Date    Abnormal Pap smear of cervix     HPV genital warts    Anemia     Anxiety     Arthritis     Asthma 10/11/2016    Bipolar 1 disorder     Diabetes mellitus, type 2     Dyslipidemia associated with type 2 diabetes mellitus 05/13/2019    Dyspnea due to COVID-19 09/12/2024    General anesthetics  "causing adverse effect in therapeutic use     Genital warts     GERD (gastroesophageal reflux disease)     Herpes simplex virus (HSV) infection     Hyperlipidemia     Hypertension     Hypertension complicating diabetes 05/05/2019    Migraine with aura and without status migrainosus, not intractable 03/21/2016    Mild persistent asthma without complication 10/11/2016    Morbid obesity with body mass index (BMI) of 45.0 to 49.9 in adult 08/03/2017    Myoclonus     Obstructive sleep apnea     ALEN (obstructive sleep apnea)     2L per N/C q HS    Schizoaffective disorder, bipolar type 04/25/2019    Seasonal allergic rhinitis due to pollen 05/13/2019    Seizures     Type 2 diabetes mellitus with hyperglycemia, with long-term current use of insulin 12/21/2017    Type 2 diabetes mellitus with hyperglycemia, without long-term current use of insulin 12/21/2017     Family History   Problem Relation Name Age of Onset    Diabetes Mother      Hyperlipidemia Mother      Hypertension Mother      Asthma Mother      COPD Mother      Glaucoma Mother      Thyroid disease Mother      Anesthesia problems Mother          "almost had a cardiac arrest" , blood clots    Hypertension Father      Hyperlipidemia Father      Cancer Father          Brain, lung, liver, kidney    No Known Problems Sister      Hypertension Brother      Alcohol abuse Brother      Heart disease Maternal Grandmother      Hyperlipidemia Maternal Grandmother      Hypertension Maternal Grandmother      Cataracts Maternal Grandmother      Diabetes Maternal Grandmother      Heart disease Maternal Grandfather      Hyperlipidemia Maternal Grandfather      Hypertension Maternal Grandfather      Glaucoma Maternal Grandfather      Cancer Maternal Grandfather      Cataracts Maternal Grandfather      Macular degeneration Maternal Grandfather      Diabetes Maternal Grandfather      Heart disease Paternal Grandmother      Hyperlipidemia Paternal Grandmother      Hypertension " Paternal Grandmother      Cataracts Paternal Grandmother      Heart disease Paternal Grandfather      Hyperlipidemia Paternal Grandfather      Hypertension Paternal Grandfather      Cataracts Paternal Grandfather      Breast cancer Maternal Cousin      Breast cancer Maternal Cousin      Breast cancer Maternal Cousin      Breast cancer Maternal Cousin       Social History[1]  Encounter Medications[2]    Review of Systems   Constitutional:  Positive for unexpected weight change. Negative for appetite change, chills and fever.   HENT:  Negative for ear pain, sinus pressure, sore throat and trouble swallowing.    Eyes:  Negative for visual disturbance.   Respiratory:  Negative for chest tightness and shortness of breath.    Cardiovascular:  Negative for chest pain.   Gastrointestinal:  Negative for abdominal pain, diarrhea, nausea and vomiting.   Endocrine: Negative for cold intolerance, polyphagia and polyuria.   Genitourinary:  Negative for decreased urine volume and dysuria.   Musculoskeletal:  Negative for back pain.   Skin:  Negative for rash.   Allergic/Immunologic: Negative for environmental allergies and food allergies.   Neurological:  Negative for dizziness, tremors, weakness and numbness.   Hematological:  Does not bruise/bleed easily.   Psychiatric/Behavioral:  Negative for confusion and hallucinations. The patient is not nervous/anxious and is not hyperactive.    All other systems reviewed and are negative.      Objective:   Physical Exam  Constitutional:       Appearance: Normal appearance. She is obese.   Neurological:      Mental Status: She is alert.   Psychiatric:         Attention and Perception: Attention normal.         Mood and Affect: Mood normal.         Speech: Speech normal.         Thought Content: Thought content is not paranoid or delusional. Thought content does not include homicidal or suicidal ideation. Thought content does not include suicidal plan.           There were no vitals taken  for this visit.  Physical Exam               Results for orders placed or performed in visit on 05/05/25   Comprehensive Metabolic Panel    Collection Time: 05/05/25  8:31 AM   Result Value Ref Range    Sodium 139 136 - 145 mmol/L    Potassium 3.7 3.5 - 5.1 mmol/L    Chloride 106 95 - 110 mmol/L    CO2 24 23 - 29 mmol/L    Glucose 183 (H) 70 - 110 mg/dL    BUN 8 6 - 20 mg/dL    Creatinine 0.8 0.5 - 1.4 mg/dL    Calcium 9.1 8.7 - 10.5 mg/dL    Protein Total 7.1 6.0 - 8.4 gm/dL    Albumin 3.6 3.5 - 5.2 g/dL    Bilirubin Total 0.2 0.1 - 1.0 mg/dL    ALP 84 40 - 150 unit/L    AST 23 11 - 45 unit/L    ALT 35 10 - 44 unit/L    Anion Gap 9 8 - 16 mmol/L    eGFR >60 >60 mL/min/1.73/m2   C-Reactive Protein    Collection Time: 05/05/25  8:31 AM   Result Value Ref Range    CRP 10.5 (H) <=8.2 mg/L   Sedimentation rate    Collection Time: 05/05/25  8:31 AM   Result Value Ref Range    Sed Rate 80 (H) <=36 mm/hr   CBC with Differential    Collection Time: 05/05/25  8:31 AM   Result Value Ref Range    WBC 6.52 3.90 - 12.70 K/uL    RBC 4.03 4.00 - 5.40 M/uL    HGB 9.1 (L) 12.0 - 16.0 gm/dL    HCT 31.2 (L) 37.0 - 48.5 %    MCV 77 (L) 82 - 98 fL    MCH 22.6 (L) 27.0 - 31.0 pg    MCHC 29.2 (L) 32.0 - 36.0 g/dL    RDW 18.2 (H) 11.5 - 14.5 %    Platelet Count 329 150 - 450 K/uL    MPV 8.2 (L) 9.2 - 12.9 fL    Nucleated RBC 0 <=0 /100 WBC    Neut % 62.7 38 - 73 %    Lymph % 28.1 18 - 48 %    Mono % 6.4 4 - 15 %    Eos % 1.4 <=8 %    Basophil % 0.8 <=1.9 %    Imm Grans % 0.6 (H) 0.0 - 0.5 %    Neut # 4.09 1.8 - 7.7 K/uL    Lymph # 1.83 1 - 4.8 K/uL    Mono # 0.42 0.3 - 1 K/uL    Eos # 0.09 <=0.5 K/uL    Baso # 0.05 <=0.2 K/uL    Imm Grans # 0.04 0.00 - 0.04 K/uL     *Note: Due to a large number of results and/or encounters for the requested time period, some results have not been displayed. A complete set of results can be found in Results Review.     Assessment:       No diagnosis found.Assessment & Plan    F31.2 Bipolar disorder, current  episode manic severe with psychotic features  F41.1 Generalized anxiety disorder  G47.00 Insomnia, unspecified  Z86.59 Personal history of other mental and behavioral disorders    BIPOLAR DISORDER WITH PSYCHOTIC FEATURES:  - Ms. Betts was recently discharged from psychiatric hospitalization with diagnoses of bipolar disorder with visual hallucinations and schizoaffective disorder.  - Medication management deferred to psychiatry.    GENERALIZED ANXIETY DISORDER:  - Diagnosed during recent psychiatric hospitalization.  - Medication management deferred to psychiatry.    INSOMNIA:  - Diagnosed during recent psychiatric hospitalization.  - Ms. Betts has questions about Lunesta related to her insomnia treatment.  - Medication management deferred to psychiatry.    MEDICATION MANAGEMENT:  - Recommend patient discuss all medications with her psychiatrist (Dr. Smith Drake), specifically Lunesta for insomnia and Aldactone.    PERSONAL HISTORY OF MENTAL AND BEHAVIORAL DISORDERS:  - Ms. Betts has documented history of mental and behavioral disorders, including bipolar disorder, schizoaffective disorder, generalized anxiety, and insomnia.        Plan:   There are no diagnoses linked to this encounter.  Today's encounter took a total time of 20 minutes, and that time included Preparing to see the patient (review records, tests), Obtaining and/or reviewing separately obtained historical data, Performing a medically appropriate examination and/or evaluation , Counseling & educating the patient/family/caregiver on treatment plan with chronic health conditions , ordering medications, tests, and/or procedures, Referring and communicating with other healthcare professionals , Documenting clinical information in the electronic or other health record, Independently interpreting results & communicating results to the patient/family/caregiver.           [facility]   [address]  [facility]  [place] [address]  Office [phone]  Fax [phone]   This note was generated with the assistance of ambient listening technology. Verbal consent was obtained by the patient and accompanying visitor(s) for the recording of patient appointment to facilitate this note. I attest to having reviewed and edited the generated note for accuracy, though some syntax or spelling errors may persist. Please contact the author of this note for any clarification.          [1]   Social History  Socioeconomic History    Marital status: Single   Tobacco Use    Smoking status: Never     Passive exposure: Never    Smokeless tobacco: Never   Substance and Sexual Activity    Alcohol use: Not Currently     Comment: socially  No alcohol 72h prior to sx    Drug use: No    Sexual activity: Yes     Partners: Male     Birth control/protection: Surgical     Comment: hyst   Other Topics Concern    Patient feels they ought to cut down on drinking/drug use No    Patient annoyed by others criticizing their drinking/drug use No    Patient has felt bad or guilty about drinking/drug use No    Patient has had a drink/used drugs as an eye opener in the AM No   Social History Narrative    Long-term care nurse     Social Drivers of Health     Financial Resource Strain: High Risk (2/20/2025)    Overall Financial Resource Strain (CARDIA)     Difficulty of Paying Living Expenses: Very hard   Food Insecurity: Food Insecurity Present (2/20/2025)    Hunger Vital Sign     Worried About Running Out of Food in the Last Year: Often true     Ran Out of Food in the Last Year: Often true   Transportation Needs: Unmet Transportation Needs (3/19/2025)    PRAPARE - Transportation     Lack of Transportation (Medical): Yes     Lack of Transportation (Non-Medical): Yes   Physical Activity: Insufficiently Active (2/20/2025)    Exercise Vital Sign     Days of Exercise per Week: 2 days     Minutes of Exercise per Session: 20 min   Stress: Stress Concern Present (2/20/2025)  "   [facility] [employer] [employer] [facility] - Occupational Stress Questionnaire     Feeling of Stress : Very much   Housing Stability: High Risk (2/20/2025)    Housing Stability Vital Sign     Unable to Pay for Housing in the Last Year: Yes     Number of Times Moved in the Last Year: 0     Homeless in the Last Year: No   [2]   Outpatient Encounter Medications as of 5/12/2025   Medication Sig Dispense Refill    albuterol (PROVENTIL) 2.5 mg /3 mL (0.083 %) nebulizer solution Take 2.5 mg by nebulization every 4 (four) hours as needed. Times a day 75 mL 11    amLODIPine (NORVASC) 10 MG tablet Take 1 tablet (10 mg total) by mouth once daily. 30 tablet 11    atorvastatin (LIPITOR) 20 MG tablet Take 1 tablet (20 mg total) by mouth every evening 90 tablet 3    BD INSULIN SYRINGE ULTRA-FINE 1/2 mL 30 gauge x 1/2" Syrg   0    blood-glucose sensor (DEXCOM G6 SENSOR) Mariela change sensor every 10 days. 3 each 11    blood-glucose transmitter (DEXCOM G6 TRANSMITTER) Mariela 1 each by Misc.(Non-Drug; Combo Route) route every 3 (three) months. 1 each 3    cholecalciferol, vitamin D3, 1,250 mcg (50,000 unit) Tab Take 1 capsule by mouth every 7 days. 12 tablet 3    cyclobenzaprine (FLEXERIL) 10 MG tablet Take 1 tablet (10 mg total) by mouth 3 (three) times daily as needed (Pain). 90 tablet 11    ergocalciferol (ERGOCALCIFEROL) 50,000 unit Cap Take 1 capsule (50,000 Units total) by mouth every 7 days. 4 capsule 6    estradioL (ESTRACE) 0.01 % (0.1 mg/gram) vaginal cream Place 1 g vaginally twice a week. 42.5 g 3    fenofibrate (TRICOR) 145 MG tablet Take 1 tablet by mouth in the morning. 30 tablet 5    fluticasone propionate (FLONASE) 50 mcg/actuation nasal spray Take 1 spray by Each Nostril route in the morning. 16 g 3    folic acid (FOLVITE) 1 MG tablet Take 1 tablet by mouth every day in the morning. 30 tablet 5    furosemide (LASIX) 20 MG tablet Take 1 tablet (20 mg total) by mouth once daily. 90 tablet 3    glucagon (BAQSIMI) 3 " "mg/actuation [place] SPRAY 1 SPRAY IN NOSTRIL AS NEEDED FOR EMERGENCY. MAY REPEAT 1 TIME AFTER 15 MINUTES 2 each 1    insulin aspart U-100 (NOVOLOG FLEXPEN U-100 INSULIN) 100 unit/mL (3 mL) InPn pen Inject 9 Units into the skin 3 (three) times daily before meals. Use sliding scale for small medium and large meals 15 mL 3    insulin aspart U-100 (NOVOLOG U-100 INSULIN ASPART) 100 unit/mL injection To use via insulin pump. Up to 200 units every 2 days 30 mL 5    insulin glargine U-100, Lantus, (LANTUS SOLOSTAR U-100 INSULIN) 100 unit/mL (3 mL) InPn pen Inject 16 Units into the skin once daily. Use in the event of pump failure 15 mL 3    insulin pump cart,auto,BT,G6/7 (OMNIPOD 5 G6-G7 PODS, GEN 5,) Crtg 1 each by Misc.(Non-Drug; Combo Route) route every 48 hours. 45 each 3    insulin syringe-needle U-100 0.3 mL 30 gauge x 5/16" Syrg 1 each by Misc.(Non-Drug; Combo Route) route Every 3 (three) days. 100 each 2    ixekizumab (TALTZ AUTOINJECTOR) 80 mg/mL AtIn Inject 160 mg into the skin once, followed by 80 mg every 4 weeks. 4 mL 1    levETIRAcetam (KEPPRA) 500 MG Tab Take 1.5 tablets (750 mg total) by mouth 2 (two) times daily. 90 tablet 0    levocetirizine (XYZAL) 5 MG tablet Take 1 tablet (5 mg total) by mouth every evening. 30 tablet 11    LIDOcaine (LIDODERM) 5 % Place 2 patches onto the skin every 12 (twelve) hours as needed (pain). Remove & Discard patch within 12 hours or as directed by MD 60 patch 11    lifitegrast (XIIDRA) 5 % Dpet Place 1 drop into both eyes 2 (two) times daily. 60 each 12    linaCLOtide (LINZESS) 145 mcg Cap capsule Take 1 capsule (145 mcg total) by mouth daily as needed (for constipation). 30 capsule 11    linaCLOtide (LINZESS) 290 mcg Cap capsule Take 1 capsule (290 mcg total) by mouth before breakfast. 30 capsule 3    magnesium oxide (MAG-OX) 400 mg (241.3 mg magnesium) tablet Take 1 tablet (400 mg total) by mouth once daily. 90 tablet 3    melatonin (MELATIN) 3 mg tablet Take 2 tablets (6 mg " total) by mouth nightly. 180 tablet 0    metoprolol succinate (TOPROL-XL) 50 MG 24 hr tablet Take 1 tablet (50 mg total) by mouth every morning. 30 tablet 11    omeprazole (PRILOSEC) 40 MG capsule Take 1 capsule (40 mg total) by mouth once daily. 90 capsule 1    ondansetron (ZOFRAN) 4 MG tablet Take 4 mg by mouth every 6 (six) hours.      OZEMPIC 2 mg/dose (8 mg/3 mL) PnIj Inject 2 mg into the skin every 7 days.      [DISCONTINUED] ALPRAZolam (XANAX) 1 MG tablet Take 1 tablet (1 mg total) by mouth 2 (two) times daily as needed for Anxiety. 60 tablet 2    [DISCONTINUED] clonazePAM (KLONOPIN) 1 MG tablet Take 1 tablet by mouth daily 30 tablet 0    [DISCONTINUED] DULoxetine (CYMBALTA) 60 MG capsule Take 1 capsule (60 mg total) by mouth 2 (two) times daily. 60 capsule 0    [DISCONTINUED] eszopiclone (LUNESTA) 1 MG Tab Take 1 tablet (1 mg total) by mouth at bedtime as needed 30 tablet 0    [DISCONTINUED] glucagon, human recombinant, (GLUCAGON EMERGENCY KIT, HUMAN,) 1 mg SolR Inject 1 mg into the muscle as needed. Emergency use 1 each 1    [DISCONTINUED] haloperidoL (HALDOL) 5 MG tablet Take 1 tablet (5 mg total) by mouth 2 (two) times daily. 60 tablet 0    [DISCONTINUED] lamoTRIgine (LAMICTAL) 100 MG tablet Take 1 tablet (100 mg total) by mouth 2 (two) times daily. 60 tablet 2    [DISCONTINUED] levETIRAcetam (KEPPRA) 500 MG Tab Take 1.5 tablets (750 mg total) by mouth 2 (two) times daily. 90 tablet 0    [DISCONTINUED] valsartan (DIOVAN) 320 MG tablet Take 1 tablet (320 mg total) by mouth once daily. 90 tablet 3     No facility-administered encounter medications on file as of 5/12/2025.

## 2025-05-21 ENCOUNTER — PATIENT MESSAGE (OUTPATIENT)
Dept: INFUSION THERAPY | Facility: HOSPITAL | Age: 55
End: 2025-05-21
Payer: MEDICAID

## 2025-05-21 ENCOUNTER — TELEPHONE (OUTPATIENT)
Dept: OPHTHALMOLOGY | Facility: CLINIC | Age: 55
End: 2025-05-21
Payer: MEDICAID

## 2025-05-21 ENCOUNTER — OFFICE VISIT (OUTPATIENT)
Dept: PRIMARY CARE CLINIC | Facility: CLINIC | Age: 55
End: 2025-05-21
Payer: MEDICAID

## 2025-05-21 DIAGNOSIS — M25.511 ACUTE PAIN OF RIGHT SHOULDER: Primary | ICD-10-CM
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
## 2025-05-21 PROCEDURE — 1159F MED LIST DOCD IN RCRD: CPT | Mod: CPTII,95,, | Performed by: NURSE PRACTITIONER

## 2025-05-21 PROCEDURE — 3052F HG A1C>EQUAL 8.0%<EQUAL 9.0%: CPT | Mod: CPTII,95,, | Performed by: NURSE PRACTITIONER

## 2025-05-21 PROCEDURE — 3072F LOW RISK FOR RETINOPATHY: CPT | Mod: CPTII,95,, | Performed by: NURSE PRACTITIONER

## 2025-05-21 PROCEDURE — 98004 SYNCH AUDIO-VIDEO EST SF 10: CPT | Mod: 95,,, | Performed by: NURSE PRACTITIONER

## 2025-05-21 PROCEDURE — 1160F RVW MEDS BY RX/DR IN RCRD: CPT | Mod: CPTII,95,, | Performed by: NURSE PRACTITIONER

## 2025-05-21 RX ORDER — SODIUM FERRIC GLUCONATE COMPLEX IN SUCROSE 12.5 MG/ML
125 INJECTION INTRAVENOUS
OUTPATIENT
Start: 2025-05-21

## 2025-05-21 NOTE — TELEPHONE ENCOUNTER
Copied from CRM #[id number]. Topic: Appointments - Appointment Access  >> May 21, 2025  8:23 AM Milagros wrote:  Type:  Needs Medical Advice    Who Called: pt  Symptoms (please be specific): blurry vision and trouble to see upclose and for away   How long has patient had these symptoms:  since last year  Pharmacy name and phone #:  [facility]  Would the patient rather a call back or a response via [facility]? call  Best Call Back Number: [phone]  Additional Information: requesting to schedule appt

## 2025-05-21 NOTE — TELEPHONE ENCOUNTER
----- Message from Lydia sent at 5/21/2025  8:35 AM CDT -----  Pt c/o  blurry vision and trouble to see upclose and for away states she can not wait months to be seen also having issues with dry eye please advise for work in slot [phone]

## 2025-05-21 NOTE — PROGRESS NOTES
Subjective:       Patient ID: Yolanda Betts is a 55 y.o. female.    Chief Complaint: Right Shoulder Pain resulting to fall that occurred 2 weeks ago  The patient location is:[place], [place]    Visit type: audiovisual-Synchronous      Face to Face time with patient: 11 min  20  minutes of total time spent on the encounter, which includes face to face time and non-face to face time preparing to see the patient (eg, review of tests), Obtaining and/or reviewing separately obtained history, Documenting clinical information in the electronic or other health record, Independently interpreting results (not separately reported) and communicating results to the patient/family/caregiver, or Care coordination (not separately reported).         Each patient to whom he or she provides medical services by telemedicine is:  (1) informed of the relationship between the physician and patient and the respective role of any other health care provider with respect to management of the patient; and (2) notified that he or she may decline to receive medical services by telemedicine and may withdraw from such care at any time.       History of Present Illness:   Yolanda Betts 55 y.o. female presents today with reports of right shoulder pain resulting in a fall while she was in a facility and wants to get it evaluated. Will obtain xray and have patient to follow up. Treatment options and alternatives were discussed with the patient. Patient provided opportunity to ask additional questions.  All questions were answered. Voices understanding and acceptance of this advice. Instructed to call back if any further questions or concerns.    Past Medical History:   Diagnosis Date    Abnormal Pap smear of cervix     HPV genital warts    Anemia     Anxiety     Arthritis     Asthma 10/11/2016    Bipolar 1 disorder     Diabetes mellitus, type 2     Dyslipidemia associated with type 2 diabetes mellitus 05/13/2019    Dyspnea due to COVID-19  "09/12/2024    General anesthetics causing adverse effect in therapeutic use     Genital warts     GERD (gastroesophageal reflux disease)     Herpes simplex virus (HSV) infection     Hyperlipidemia     Hypertension     Hypertension complicating diabetes 05/05/2019    Migraine with aura and without status migrainosus, not intractable 03/21/2016    Mild persistent asthma without complication 10/11/2016    Morbid obesity with body mass index (BMI) of 45.0 to 49.9 in adult 08/03/2017    Myoclonus     Obstructive sleep apnea     ALEN (obstructive sleep apnea)     2L per N/C q HS    Schizoaffective disorder, bipolar type 04/25/2019    Seasonal allergic rhinitis due to pollen 05/13/2019    Seizures     Type 2 diabetes mellitus with hyperglycemia, with long-term current use of insulin 12/21/2017    Type 2 diabetes mellitus with hyperglycemia, without long-term current use of insulin 12/21/2017     Family History   Problem Relation Name Age of Onset    Diabetes Mother      Hyperlipidemia Mother      Hypertension Mother      Asthma Mother      COPD Mother      Glaucoma Mother      Thyroid disease Mother      Anesthesia problems Mother          "almost had a cardiac arrest" , blood clots    Hypertension Father      Hyperlipidemia Father      Cancer Father          Brain, lung, liver, kidney    No Known Problems Sister      Hypertension Brother      Alcohol abuse Brother      Heart disease Maternal Grandmother      Hyperlipidemia Maternal Grandmother      Hypertension Maternal Grandmother      Cataracts Maternal Grandmother      Diabetes Maternal Grandmother      Heart disease Maternal Grandfather      Hyperlipidemia Maternal Grandfather      Hypertension Maternal Grandfather      Glaucoma Maternal Grandfather      Cancer Maternal Grandfather      Cataracts Maternal Grandfather      Macular degeneration Maternal Grandfather      Diabetes Maternal Grandfather      Heart disease Paternal Grandmother      Hyperlipidemia Paternal " Grandmother      Hypertension Paternal Grandmother      Cataracts Paternal Grandmother      Heart disease Paternal Grandfather      Hyperlipidemia Paternal Grandfather      Hypertension Paternal Grandfather      Cataracts Paternal Grandfather      Breast cancer Maternal Cousin      Breast cancer Maternal Cousin      Breast cancer Maternal Cousin      Breast cancer Maternal Cousin       Social History[1]  Encounter Medications[2]    Review of Systems   Constitutional:  Positive for unexpected weight change. Negative for activity change.   HENT:  Negative for hearing loss, rhinorrhea and trouble swallowing.    Eyes:  Negative for discharge and visual disturbance.   Respiratory:  Negative for chest tightness and wheezing.    Cardiovascular:  Negative for chest pain and palpitations.   Gastrointestinal:  Negative for blood in stool, constipation, diarrhea and vomiting.   Endocrine: Negative for polydipsia and polyuria.   Genitourinary:  Negative for difficulty urinating, dysuria, hematuria and menstrual problem.   Musculoskeletal:  Negative for arthralgias, joint swelling and neck pain.        Right shoulder pain    Neurological:  Negative for weakness and headaches.   Psychiatric/Behavioral:  Negative for confusion and dysphoric mood.        Objective:      There were no vitals taken for this visit.  Physical Exam  Constitutional:       Appearance: She is obese.   Neurological:      Mental Status: She is alert.                     Results for orders placed or performed in visit on 05/05/25   Comprehensive Metabolic Panel    Collection Time: 05/05/25  8:31 AM   Result Value Ref Range    Sodium 139 136 - 145 mmol/L    Potassium 3.7 3.5 - 5.1 mmol/L    Chloride 106 95 - 110 mmol/L    CO2 24 23 - 29 mmol/L    Glucose 183 (H) 70 - 110 mg/dL    BUN 8 6 - 20 mg/dL    Creatinine 0.8 0.5 - 1.4 mg/dL    Calcium 9.1 8.7 - 10.5 mg/dL    Protein Total 7.1 6.0 - 8.4 gm/dL    Albumin 3.6 3.5 - 5.2 g/dL    Bilirubin Total 0.2 0.1 -  1.0 mg/dL    ALP 84 40 - 150 unit/L    AST 23 11 - 45 unit/L    ALT 35 10 - 44 unit/L    Anion Gap 9 8 - 16 mmol/L    eGFR >60 >60 mL/min/1.73/m2   C-Reactive Protein    Collection Time: 05/05/25  8:31 AM   Result Value Ref Range    CRP 10.5 (H) <=8.2 mg/L   Sedimentation rate    Collection Time: 05/05/25  8:31 AM   Result Value Ref Range    Sed Rate 80 (H) <=36 mm/hr   CBC with Differential    Collection Time: 05/05/25  8:31 AM   Result Value Ref Range    WBC 6.52 3.90 - 12.70 K/uL    RBC 4.03 4.00 - 5.40 M/uL    HGB 9.1 (L) 12.0 - 16.0 gm/dL    HCT 31.2 (L) 37.0 - 48.5 %    MCV 77 (L) 82 - 98 fL    MCH 22.6 (L) 27.0 - 31.0 pg    MCHC 29.2 (L) 32.0 - 36.0 g/dL    RDW 18.2 (H) 11.5 - 14.5 %    Platelet Count 329 150 - 450 K/uL    MPV 8.2 (L) 9.2 - 12.9 fL    Nucleated RBC 0 <=0 /100 WBC    Neut % 62.7 38 - 73 %    Lymph % 28.1 18 - 48 %    Mono % 6.4 4 - 15 %    Eos % 1.4 <=8 %    Basophil % 0.8 <=1.9 %    Imm Grans % 0.6 (H) 0.0 - 0.5 %    Neut # 4.09 1.8 - 7.7 K/uL    Lymph # 1.83 1 - 4.8 K/uL    Mono # 0.42 0.3 - 1 K/uL    Eos # 0.09 <=0.5 K/uL    Baso # 0.05 <=0.2 K/uL    Imm Grans # 0.04 0.00 - 0.04 K/uL     *Note: Due to a large number of results and/or encounters for the requested time period, some results have not been displayed. A complete set of results can be found in Results Review.     Assessment:       1. Acute pain of right shoulder    2. Fall, subsequent encounter    Assessment & Plan             Plan:   Acute pain of right shoulder  -     X-ray Shoulder 2 or More Views Right; Future; Expected date: 05/21/2025    Fall, subsequent encounter      Today's encounter took a total time of 20minutes, and that time included Preparing to see the patient (review records, tests), Obtaining and/or reviewing separately obtained historical data, Performing a medically appropriate examination and/or evaluation , Counseling & educating the patient/family/caregiver on treatment plan with chronic health conditions ,  ordering medications, tests, and/or procedures, Referring and communicating with other healthcare professionals , Documenting clinical information in the electronic or other health record, Independently interpreting results & communicating results to the patient/family/caregiver.           [facility]   [address]  [place] [address]  Office [phone]  Fax [phone]   This note was generated with the assistance of ambient listening technology. Verbal consent was obtained by the patient and accompanying visitor(s) for the recording of patient appointment to facilitate this note. I attest to having reviewed and edited the generated note for accuracy, though some syntax or spelling errors may persist. Please contact the author of this note for any clarification.   History of Present Illness                 [1]   Social History  Socioeconomic History    Marital status: Single   Tobacco Use    Smoking status: Never     Passive exposure: Never    Smokeless tobacco: Never   Substance and Sexual Activity    Alcohol use: Not Currently     Comment: socially  No alcohol 72h prior to sx    Drug use: No    Sexual activity: Yes     Partners: Male     Birth control/protection: Surgical     Comment: hyst   Other Topics Concern    Patient feels they ought to cut down on drinking/drug use No    Patient annoyed by others criticizing their drinking/drug use No    Patient has felt bad or guilty about drinking/drug use No    Patient has had a drink/used drugs as an eye opener in the AM No   Social History Narrative    Long-term care nurse     Social Drivers of Health     Financial Resource Strain: High Risk (2/20/2025)    Overall Financial Resource Strain (CARDIA)     Difficulty of Paying Living Expenses: Very hard   Food Insecurity: Food Insecurity Present (2/20/2025)    Hunger Vital Sign     Worried About Running Out of Food in the Last Year: Often true     Ran Out of Food in  "the Last Year: Often true   Transportation Needs: Unmet Transportation Needs (3/19/2025)    PRAPARE - Transportation     Lack of Transportation (Medical): Yes     Lack of Transportation (Non-Medical): Yes   Physical Activity: Insufficiently Active (2/20/2025)    Exercise Vital Sign     Days of Exercise per Week: 2 days     Minutes of Exercise per Session: 20 min   Stress: Stress Concern Present (2/20/2025)    [place] [employer] - Occupational Stress Questionnaire     Feeling of Stress : Very much   Housing Stability: High Risk (2/20/2025)    Housing Stability Vital Sign     Unable to Pay for Housing in the Last Year: Yes     Number of Times Moved in the Last Year: 0     Homeless in the Last Year: No   [2]   Outpatient Encounter Medications as of 5/21/2025   Medication Sig Dispense Refill    albuterol (PROVENTIL) 2.5 mg /3 mL (0.083 %) nebulizer solution Take 2.5 mg by nebulization every 4 (four) hours as needed. Times a day 75 mL 11    ALPRAZolam (XANAX) 1 MG tablet Take 1 tablet (1 mg total) by mouth daily as needed for Anxiety. 30 tablet 1    amLODIPine (NORVASC) 10 MG tablet Take 1 tablet (10 mg total) by mouth once daily. 30 tablet 11    atorvastatin (LIPITOR) 20 MG tablet Take 1 tablet (20 mg total) by mouth every evening 90 tablet 3    BD INSULIN SYRINGE ULTRA-FINE 1/2 mL 30 gauge x 1/2" Syrg   0    blood-glucose sensor (DEXCOM G6 SENSOR) Mariela change sensor every 10 days. 3 each 11    blood-glucose transmitter (DEXCOM G6 TRANSMITTER) Mariela 1 each by Misc.(Non-Drug; Combo Route) route every 3 (three) months. 1 each 3    cholecalciferol, vitamin D3, 1,250 mcg (50,000 unit) Tab Take 1 capsule by mouth every 7 days. 12 tablet 3    cyclobenzaprine (FLEXERIL) 10 MG tablet Take 1 tablet (10 mg total) by mouth 3 (three) times daily as needed (Pain). 90 tablet 11    DULoxetine (CYMBALTA) 60 MG capsule Take 1 capsule (60 mg total) by mouth 2 (two) times daily. 60 capsule 1    ergocalciferol " (ERGOCALCIFEROL) 50,000 unit Cap Take 1 capsule (50,000 Units total) by mouth every 7 days. 4 capsule 6    estradioL (ESTRACE) 0.01 % (0.1 mg/gram) vaginal cream Place 1 g vaginally twice a week. 42.5 g 3    eszopiclone (LUNESTA) 1 MG Tab Take 1 tablet (1 mg total) by mouth at bedtime as needed 30 tablet 1    fenofibrate (TRICOR) 145 MG tablet Take 1 tablet by mouth in the morning. 30 tablet 5    fluconazole (DIFLUCAN) 150 MG Tab Take 1 tablet (150 mg total) by mouth once daily. for 3 doses 3 tablet 0    fluticasone propionate (FLONASE) 50 mcg/actuation nasal spray Take 1 spray by Each Nostril route in the morning. 16 g 3    folic acid (FOLVITE) 1 MG tablet Take 1 tablet by mouth every day in the morning. 30 tablet 5    folic acid (FOLVITE) 1 MG tablet Take 1 tablet by mouth every day in the morning 30 tablet 5    furosemide (LASIX) 20 MG tablet Take 1 tablet (20 mg total) by mouth once daily. 90 tablet 3    glucagon (BAQSIMI) 3 mg/actuation [place] SPRAY 1 SPRAY IN NOSTRIL AS NEEDED FOR EMERGENCY. MAY REPEAT 1 TIME AFTER 15 MINUTES 2 each 1    haloperidoL (HALDOL) 5 MG tablet Take 1 tablet (5 mg total) by mouth 2 (two) times daily. 60 tablet 0    hydrocortisone (ANUSOL-HC) 2.5 % rectal cream Place rectally once daily for 10 days 30 g 2    insulin aspart U-100 (NOVOLOG FLEXPEN U-100 INSULIN) 100 unit/mL (3 mL) InPn pen Inject 9 Units into the skin 3 (three) times daily before meals. Use sliding scale for small medium and large meals 15 mL 3    insulin aspart U-100 (NOVOLOG U-100 INSULIN ASPART) 100 unit/mL injection To use via insulin pump. Up to 200 units every 2 days 30 mL 5    insulin glargine U-100, Lantus, (LANTUS SOLOSTAR U-100 INSULIN) 100 unit/mL (3 mL) InPn pen Inject 16 Units into the skin once daily. Use in the event of pump failure 15 mL 3    insulin pump cart,auto,BT,G6/7 (OMNIPOD 5 G6-G7 PODS, GEN 5,) Crtg 1 each by Misc.(Non-Drug; Combo Route) route every 48 hours. 45 each 3    insulin syringe-needle  "U-100 0.3 mL 30 gauge x 5/16" Syrg 1 each by Misc.(Non-Drug; Combo Route) route Every 3 (three) days. 100 each 2    ixekizumab (TALTZ AUTOINJECTOR) 80 mg/mL AtIn Inject 160 mg into the skin once, followed by 80 mg every 4 weeks. 4 mL 1    [START ON 6/9/2025] lamoTRIgine (LAMICTAL) 100 MG tablet Take 1 tablet (100 mg total) by mouth 2 (two) times daily. 60 tablet 1    lamoTRIgine (LAMICTAL) 25 MG tablet Take 1 tablet (25 mg total) by mouth every morning for 14 days, THEN 2 tablets (50 mg total) every morning for 14 days. 42 tablet 0    levETIRAcetam (KEPPRA) 500 MG Tab Take 1.5 tablets (750 mg total) by mouth 2 (two) times daily. 90 tablet 0    levocetirizine (XYZAL) 5 MG tablet Take 1 tablet (5 mg total) by mouth every evening. 30 tablet 11    LIDOcaine (LIDODERM) 5 % Place 2 patches onto the skin every 12 (twelve) hours as needed (pain). Remove & Discard patch within 12 hours or as directed by MD 60 patch 11    lifitegrast (XIIDRA) 5 % Dpet Place 1 drop into both eyes 2 (two) times daily. 60 each 12    linaCLOtide (LINZESS) 145 mcg Cap capsule Take 1 capsule (145 mcg total) by mouth daily as needed (for constipation). 30 capsule 11    linaCLOtide (LINZESS) 290 mcg Cap capsule Take 1 capsule (290 mcg total) by mouth before breakfast. 30 capsule 3    magnesium oxide (MAG-OX) 400 mg (241.3 mg magnesium) tablet Take 1 tablet (400 mg total) by mouth once daily. 90 tablet 3    melatonin (MELATIN) 3 mg tablet Take 2 tablets (6 mg total) by mouth nightly. 180 tablet 0    methylPREDNISolone (MEDROL DOSEPACK) 4 mg tablet take as directed on package with food 21 tablet 1    metoprolol succinate (TOPROL-XL) 50 MG 24 hr tablet Take 1 tablet (50 mg total) by mouth every morning. 30 tablet 11    omeprazole (PRILOSEC) 40 MG capsule Take 1 capsule (40 mg total) by mouth once daily. 90 capsule 1    ondansetron (ZOFRAN) 4 MG tablet Take 4 mg by mouth every 6 (six) hours.      OZEMPIC 2 mg/dose (8 mg/3 mL) PnElis Inject 2 mg into the " skin every 7 days.      traMADoL (ULTRAM) 50 mg tablet Take 1 tablet (50 mg total) by mouth every 8 (eight) hours as needed for Pain. 21 tablet 0    [DISCONTINUED] clonazePAM (KLONOPIN) 1 MG tablet Take 1 tablet by mouth daily 30 tablet 0    [DISCONTINUED] glucagon, human recombinant, (GLUCAGON EMERGENCY KIT, HUMAN,) 1 mg SolR Inject 1 mg into the muscle as needed. Emergency use 1 each 1    [DISCONTINUED] valsartan (DIOVAN) 320 MG tablet Take 1 tablet (320 mg total) by mouth once daily. 90 tablet 3     No facility-administered encounter medications on file as of 5/21/2025.

## 2025-05-22 ENCOUNTER — CLINICAL SUPPORT (OUTPATIENT)
Dept: DIABETES | Facility: CLINIC | Age: 55
End: 2025-05-22
Payer: MEDICAID

## 2025-05-22 DIAGNOSIS — E11.65 TYPE 2 DIABETES MELLITUS WITH HYPERGLYCEMIA, WITH LONG-TERM CURRENT USE OF INSULIN: Primary | ICD-10-CM

## 2025-05-22 DIAGNOSIS — Z79.4 TYPE 2 DIABETES MELLITUS WITH HYPERGLYCEMIA, WITH LONG-TERM CURRENT USE OF INSULIN: Primary | ICD-10-CM

## 2025-05-22 NOTE — Clinical Note
Bebeto Johnson,  I met w/ Ms Yolanda today. She's had recent inpt psych admit and was taken off OP5. She's wanting to resume and requesting in-person appt with me to assist. She states hosp dc with Lantus 12units daily and Novolog ac per s/s (unable to get clear amts from her today). Dexcom stopped ~5/18 - Dex & OP5 reports saved media. Pt reports seeing LINO Enriquez endo from [facility], but I'm unable to find outside endo records.   Ms Guerrero is requesting visit with you to assist w/ care.   I entered outpt referral case mgmt today.  Thank you, Yara
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
## 2025-05-23 ENCOUNTER — TELEPHONE (OUTPATIENT)
Dept: DIABETES | Facility: CLINIC | Age: 55
End: 2025-05-23
Payer: MEDICAID

## 2025-05-23 NOTE — TELEPHONE ENCOUNTER
----- Message from Nurse Otto sent at 5/23/2025  9:56 AM CDT -----  Regarding: FW:  Please schedule this pt with Sara. 60 min.  ----- Message -----  From: Yara Sanchez RD, [facility]  Sent: 5/23/2025   9:22 AM CDT  To: Elizabeth Ceron NP; [facility]  Subject: RE:                                              Bebeto Johnson, Thank you! I'm linking our nurse team to assist w/ new provider in-person visit. Yara  ----- Message -----  From: Elizabeth Ceron NP  Sent: 5/22/2025   4:33 PM CDT  To: Yara Sanchez RD, [facility]  Subject: RE:                                              Hi, due to the complexity of her case, she was referred to endocrinology by one of the physicians in the emergency room. I recommend she continue to see endocrinology or see one of our in-clinic providers in diabetes. She is experiencing a lot of confusion and needs treatment instructions printed and given to her. She also needs someone to make the pump changes for her.     I will sign the case management order. Do you want the nurse to set her up with a provider?  ----- Message -----  From: Yara Sanchez RD, [facility]  Sent: 5/22/2025   1:32 PM CDT  To: IRASEMA Brown,   I met w/ Ms Guerrero today. She's had recent inpt psych admit and was taken off OP5. She's wanting to resume and requesting in-person appt with me to assist. She states hosp dc with Lantus 12units daily and Novolog ac per s/s (unable to get clear amts from her today). Dexcom stopped ~5/18 - Dex & OP5 reports saved media. Pt reports seeing LINO Enriquez endo from [facility], but I'm unable to find outside endo records.     Ms Guerrero is requesting visit with you to assist w/ care.     I entered outpt referral case mgmt today.   Thank you, Yara

## 2025-05-23 NOTE — TELEPHONE ENCOUNTER
Spoke with patient to assist with scheduling an appointment with Sara Alaniz     Appointment scheduled

## 2025-05-26 RX ORDER — SEMAGLUTIDE 2.68 MG/ML
2 INJECTION, SOLUTION SUBCUTANEOUS
Qty: 3 ML | Refills: 1 | Status: SHIPPED | OUTPATIENT
Start: 2025-05-26 | End: 2025-07-21

## 2025-05-27 ENCOUNTER — HOSPITAL ENCOUNTER (EMERGENCY)
Facility: HOSPITAL | Age: 55
Discharge: HOME | End: 2025-05-27
Attending: EMERGENCY MEDICINE
Payer: MEDICAID

## 2025-05-27 VITALS
DIASTOLIC BLOOD PRESSURE: 66 MMHG | SYSTOLIC BLOOD PRESSURE: 122 MMHG | OXYGEN SATURATION: 96 % | HEART RATE: 93 BPM | RESPIRATION RATE: 16 BRPM | TEMPERATURE: 99 F

## 2025-05-27 DIAGNOSIS — R52 PAIN: ICD-10-CM
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
## 2025-05-27 DIAGNOSIS — M25.511 ACUTE PAIN OF RIGHT SHOULDER: ICD-10-CM

## 2025-05-27 DIAGNOSIS — M25.561 ACUTE PAIN OF RIGHT KNEE: ICD-10-CM

## 2025-05-27 PROCEDURE — 25000003 PHARM REV CODE 250: Performed by: NURSE PRACTITIONER

## 2025-05-27 PROCEDURE — 99284 EMERGENCY DEPT VISIT MOD MDM: CPT | Mod: 25

## 2025-05-27 RX ORDER — ONDANSETRON 4 MG/1
4 TABLET, ORALLY DISINTEGRATING ORAL
Status: COMPLETED | OUTPATIENT
Start: 2025-05-27 | End: 2025-05-27

## 2025-05-27 RX ORDER — DICLOFENAC SODIUM 10 MG/G
2 GEL TOPICAL 3 TIMES DAILY PRN
Qty: 100 G | Refills: 0 | Status: SHIPPED | OUTPATIENT
Start: 2025-05-27

## 2025-05-27 RX ORDER — TRAMADOL HYDROCHLORIDE 50 MG/1
50 TABLET, FILM COATED ORAL EVERY 6 HOURS PRN
Qty: 12 TABLET | Refills: 0 | Status: SHIPPED | OUTPATIENT
Start: 2025-05-27 | End: 2025-05-27 | Stop reason: CLARIF

## 2025-05-27 RX ORDER — TRAMADOL HYDROCHLORIDE 50 MG/1
50 TABLET, FILM COATED ORAL
Status: COMPLETED | OUTPATIENT
Start: 2025-05-27 | End: 2025-05-27

## 2025-05-27 RX ADMIN — TRAMADOL HYDROCHLORIDE 50 MG: 50 TABLET, COATED ORAL at 06:05

## 2025-05-27 RX ADMIN — ONDANSETRON 4 MG: 4 TABLET, ORALLY DISINTEGRATING ORAL at 06:05

## 2025-05-27 NOTE — ED PROVIDER NOTES
Encounter Date: 5/27/2025       History     Chief Complaint   Patient presents with    Fall     Ground level fall after tripping over a plastic pool. Landed on right knee then fell onto shoulder. +right knee pain. +bilateral shoulder pain. +Spinal pain. Unknown of hitting head. -thinners      55-year-old female presents to ER complaining of pain to right knee, right shoulder, and low back after trip and fall just prior to arrival.  Denies head injury or loss of consciousness.  Denies numbness, tingling, or incontinence.  Reports no treatment prior to arrival.        Review of patient's allergies indicates:   Allergen Reactions    Codeine Itching    Hydromorphone Other (See Comments)     Can't wake up for long time if taken    Slow to wake up after surgery after receiving    Aleve [naproxen sodium]      Increases BP    Ibuprofen      Increases BP    Neuromuscular blockers, steroidal Hives     some    Pregabalin Itching    Versed [midazolam]     Latex, natural rubber Rash    Morphine Rash     itching    Norco [hydrocodone-acetaminophen] Itching, Rash and Hallucinations    Secobarbital sodium Rash     itching    Tylox [oxycodone-acetaminophen] Rash     Past Medical History:   Diagnosis Date    Abnormal Pap smear of cervix     HPV genital warts    Anemia     Anxiety     Arthritis     Asthma 10/11/2016    Bipolar 1 disorder     Diabetes mellitus, type 2     Dyslipidemia associated with type 2 diabetes mellitus 05/13/2019    Dyspnea due to COVID-19 09/12/2024    General anesthetics causing adverse effect in therapeutic use     Genital warts     GERD (gastroesophageal reflux disease)     Herpes simplex virus (HSV) infection     Hyperlipidemia     Hypertension     Hypertension complicating diabetes 05/05/2019    Migraine with aura and without status migrainosus, not intractable 03/21/2016    Mild persistent asthma without complication 10/11/2016    Morbid obesity with body mass index (BMI) of 45.0 to 49.9 in adult  2017    Myoclonus     Obstructive sleep apnea     ALEN (obstructive sleep apnea)     2L per N/C q HS    Schizoaffective disorder, bipolar type 2019    Seasonal allergic rhinitis due to pollen 2019    Seizures     Type 2 diabetes mellitus with hyperglycemia, with long-term current use of insulin 2017    Type 2 diabetes mellitus with hyperglycemia, without long-term current use of insulin 2017     Past Surgical History:   Procedure Laterality Date    abcess removed right back      ANGIOGRAM, CORONARY, WITH LEFT HEART CATHETERIZATION N/A 2024    Procedure: Angiogram, Coronary, with Left Heart Cath;  Surgeon: Jaimie Wills MD;  Location: [facility] CATH LAB;  Service: Cardiology;  Laterality: N/A;    BELT ABDOMINOPLASTY      BREAST SURGERY Bilateral     Reduction    CARPAL TUNNEL RELEASE Bilateral 2023    Procedure: RELEASE, CARPAL TUNNEL;  Surgeon: Neville Roth MD;  Location: [facility] OR;  Service: Orthopedics;  Laterality: Bilateral;  bilateral carpal tunnel release     SECTION      X 2    COLONOSCOPY      COLONOSCOPY N/A 2018    Procedure: colonoscopy for iron deficiency anemia;  Surgeon: Frankie Sanchez MD;  Location: [facility];  Service: Endoscopy;  Laterality: N/A;    COLONOSCOPY N/A 2018    Procedure: COLONOSCOPY;  Surgeon: Frankie Sanchez MD;  Location: [facility];  Service: Endoscopy;  Laterality: N/A;    COLONOSCOPY N/A 3/10/2023    Procedure: COLONOSCOPY;  Surgeon: Lalita Montes De Oca MD;  Location: [facility];  Service: Endoscopy;  Laterality: N/A;    COLONOSCOPY N/A 2024    Procedure: COLONOSCOPY;  Surgeon: Tara Og MD;  Location: [facility];  Service: Endoscopy;  Laterality: N/A;    EPIDURAL STEROID INJECTION INTO CERVICAL SPINE N/A 2023    Procedure: C6/7 IL ROCAEL RN IV Sedation;  Surgeon: Otto Phillips MD;  Location: [facility] PAIN MGT;  Service: Pain Management;  Laterality: N/A;    EPIDURAL STEROID INJECTION INTO  "CERVICAL SPINE N/A 1/30/2024    Procedure: C5/6 IL ROCAEL;  Surgeon: Otto Phillips MD;  Location: [facility] PAIN MGT;  Service: Pain Management;  Laterality: N/A;    ESOPHAGOGASTRODUODENOSCOPY N/A 3/10/2023    Procedure: EGD (ESOPHAGOGASTRODUODENOSCOPY);  Surgeon: Lalita Montes De Oca MD;  Location: [facility] ENDO;  Service: Endoscopy;  Laterality: N/A;    HYSTERECTOMY      w/ BSO; hypermenorrhea    INJECTION OF ANESTHETIC AGENT AROUND MEDIAL BRANCH NERVES INNERVATING CERVICAL FACET JOINT Bilateral 12/19/2023    Procedure: Bilateral C5-7 MBB (diagnostic);  Surgeon: Otto Phillips MD;  Location: [facility] PAIN MGT;  Service: Pain Management;  Laterality: Bilateral;    MOUTH SURGERY  1996    OOPHORECTOMY      hyst/bso, hypermenorrhea    ROBOT-ASSISTED CHOLECYSTECTOMY USING DA DARI XI N/A 1/3/2020    Procedure: XI ROBOTIC CHOLECYSTECTOMY;  Surgeon: Damir Driscoll MD;  Location: [facility] OR;  Service: General;  Laterality: N/A;    TOTAL REDUCTION MAMMOPLASTY      TUBAL LIGATION       Family History   Problem Relation Name Age of Onset    Diabetes Mother      Hyperlipidemia Mother      Hypertension Mother      Asthma Mother      COPD Mother      Glaucoma Mother      Thyroid disease Mother      Anesthesia problems Mother          "almost had a cardiac arrest" , blood clots    Hypertension Father      Hyperlipidemia Father      Cancer Father          Brain, lung, liver, kidney    No Known Problems Sister      Hypertension Brother      Alcohol abuse Brother      Heart disease Maternal Grandmother      Hyperlipidemia Maternal Grandmother      Hypertension Maternal Grandmother      Cataracts Maternal Grandmother      Diabetes Maternal Grandmother      Heart disease Maternal Grandfather      Hyperlipidemia Maternal Grandfather      Hypertension Maternal Grandfather      Glaucoma Maternal Grandfather      Cancer Maternal Grandfather      Cataracts Maternal Grandfather      Macular degeneration Maternal Grandfather      Diabetes Maternal " Grandfather      Heart disease Paternal Grandmother      Hyperlipidemia Paternal Grandmother      Hypertension Paternal Grandmother      Cataracts Paternal Grandmother      Heart disease Paternal Grandfather      Hyperlipidemia Paternal Grandfather      Hypertension Paternal Grandfather      Cataracts Paternal Grandfather      Breast cancer Maternal Cousin      Breast cancer Maternal Cousin      Breast cancer Maternal Cousin      Breast cancer Maternal Cousin       Social History[1]  Review of Systems   Constitutional:  Negative for fever.   HENT:  Negative for sore throat.    Respiratory:  Negative for shortness of breath.    Cardiovascular:  Negative for chest pain.   Gastrointestinal:  Negative for nausea.   Genitourinary:  Negative for dysuria.   Musculoskeletal:  Positive for back pain. Negative for neck pain.        Positive shoulder pain, positive knee pain   Skin:  Negative for rash.   Neurological:  Negative for weakness.   Hematological:  Does not bruise/bleed easily.       Physical Exam     Initial Vitals [05/27/25 1719]   BP Pulse Resp Temp SpO2   122/66 93 16 98.9 °F (37.2 °C) 96 %      MAP       --         Physical Exam    Nursing note and vitals reviewed.  Constitutional: She appears well-developed and well-nourished.   HENT:   Head: Normocephalic and atraumatic.   Eyes: Conjunctivae and EOM are normal. Pupils are equal, round, and reactive to light.   Neck: Neck supple.   Normal range of motion.  Cardiovascular:  Normal heart sounds.           Pulmonary/Chest: Breath sounds normal. No respiratory distress.   Abdominal: She exhibits no distension. There is no abdominal tenderness.   Musculoskeletal:      Right shoulder: Tenderness present. No swelling, deformity or crepitus. Normal range of motion. Normal strength. Normal pulse.      Right upper arm: Normal.      Right elbow: Normal.      Right forearm: Normal.      Right wrist: Normal.      Right hand: Normal. Normal sensation. There is no disruption  of two-point discrimination. Normal capillary refill. Normal pulse.      Cervical back: Normal, normal range of motion and neck supple.      Thoracic back: Normal.      Lumbar back: Tenderness present. No deformity or bony tenderness. Normal range of motion.      Right hip: Normal.      Left hip: Normal.      Right upper leg: Normal.      Left upper leg: Normal.      Right knee: No swelling, deformity or crepitus. Normal range of motion. Tenderness present. Normal alignment. Normal pulse.      Left knee: Normal.      Right lower leg: Normal. No swelling or tenderness.      Left lower leg: Normal.      Right ankle: Normal. No tenderness. Normal pulse.      Left ankle: Normal.      Right foot: Normal. Normal capillary refill. Normal pulse.      Left foot: Normal.      Comments: Bilateral paraspinal tenderness to lumbar no bony point tenderness.  No midline tenderness.  No step-offs.     Neurological: She is alert and oriented to person, place, and time. She has normal strength. No cranial nerve deficit or sensory deficit. GCS score is 15. GCS eye subscore is 4. GCS verbal subscore is 5. GCS motor subscore is 6.   Skin: Skin is warm and dry. Capillary refill takes less than 2 seconds. No rash noted.   Psychiatric: She has a normal mood and affect.         ED Course   Procedures  Labs Reviewed - No data to display       Imaging Results              X-Ray Lumbar Spine Ap And Lateral (Final result)  Result time 05/27/25 18:00:42      Final result by Chandu Martini MD (05/27/25 18:00:42)                   Impression:     No acute findings.  See above.    Finalized on: 5/27/2025 6:00 PM By:  Chandu Martini MD  [facility]# [id number]      2025-05-27 18:02:52.543     [facility]               Narrative:    EXAM: XR LUMBAR SPINE AP AND LATERAL    CLINICAL HISTORY: Lower back pain.    TECHNIQUE: 3 view lumbar spine x-ray series    FINDINGS: No fracture or subluxation. Disc spaces are maintained. Arthritic changes involving the facets at  L5-S1.                                         X-Ray Knee 3 View Right (Final result)  Result time 05/27/25 18:01:02   Procedure changed from X-Ray Knee Complete 4 Or More Views Right     Final result by Chandu Martini MD (05/27/25 18:01:02)                   Impression:      Normal study.    Finalized on: 5/27/2025 6:01 PM By:  Chandu Martini MD  [facility]# [id number]      2025-05-27 18:03:12.537     [facility]               Narrative:    EXAM: XR KNEE 3 VIEW RIGHT    CLINICAL HISTORY: Right knee pain    TECHNIQUE: 3 view right knee series.    FINDINGS: No fracture, dislocation, or arthritic change.                                         X-Ray Shoulder Complete 2 View Right (Final result)  Result time 05/27/25 18:00:15      Final result by Chandu Martini MD (05/27/25 18:00:15)                   Impression:     See above.    Finalized on: 5/27/2025 6:00 PM By:  Chandu Martini MD  [facility]# [id number]      2025-05-27 18:02:22.529     [facility]               Narrative:    EXAM:  XR SHOULDER COMPLETE 2 OR MORE VIEWS RIGHT    CLINICAL HISTORY: Right shoulder pain.    TECHNIQUE: 3 view right shoulder series    FINDINGS: No fracture or dislocation.No significant arthritic change.  Calcification in the supraspinatus tendon suggests calcific tendinopathy.                                         Medications   traMADoL tablet 50 mg (50 mg Oral Given 5/27/25 1801)   ondansetron disintegrating tablet 4 mg (4 mg Oral Given 5/27/25 1801)     Medical Decision Making  Amount and/or Complexity of Data Reviewed  Radiology: ordered.    Risk  Prescription drug management.                     6:11 PM  Patient presents to ER for evaluation after falling.  Patient comfortable.  Afebrile.  Nontoxic appearing.  Vital signs stable.  Imaging is without acute findings.  All reports discussed with patient.  Sling applied for comfort.  I will discharge patient home with diclofenac gel and have her follow up with primary care provider and/ortho for further evaluation and  management.  Advised to return to ER for new or worsening symptoms.  Patient verbalized understanding and is in agreement with this plan.  Stable for discharge.  Differential diagnosis include  arthritis, bursitis, fracture, rotator cuff injury, AC joint separation, calcific tendinitis, ACL injury, meniscus injury, compression fracture.                 Clinical Impression:  Final diagnoses:  [R52] Pain  [W19.XXXA] Fall, initial encounter (Primary)  [M25.511] Acute pain of right shoulder  [M25.561] Acute pain of right knee          ED Disposition Condition    Discharge Stable          ED Prescriptions       Medication Sig Dispense Start Date End Date Auth. Provider    traMADoL (ULTRAM) 50 mg tablet  (Status: Discontinued) Take 1 tablet (50 mg total) by mouth every 6 (six) hours as needed for Pain. 12 tablet 5/27/2025 5/27/2025 Nancy Wall NP    diclofenac sodium (VOLTAREN) 1 % Gel Apply 2 g topically 3 (three) times daily as needed (Pain). 2 g 5/27/2025 -- Nancy Wall NP          Follow-up Information       Follow up With Specialties Details Why Contact Info    Rose Khan, DNP Family Medicine Schedule an appointment as soon as possible for a visit   [address]  [address]  [place] [address] [phone]      [facility] Orthopedics Schedule an appointment as soon as possible for a visit   [address] [place] [facility] [place] [id number] [phone]                   [1]   Social History  Tobacco Use    Smoking status: Never     Passive exposure: Never    Smokeless tobacco: Never   Substance Use Topics    Alcohol use: Not Currently     Comment: socially  No alcohol 72h prior to sx    Drug use: No        Nancy Wall NP  05/28/25 [id number]

## 2025-05-27 NOTE — DISCHARGE INSTRUCTIONS
Take medication as prescribed.  Take medication with food.  Rest and ice affected areas.  Follow up with your primary care provider for re-evaluation.  Return to ER for new or worsening symptoms.

## 2025-05-28 ENCOUNTER — TELEPHONE (OUTPATIENT)
Dept: ORTHOPEDICS | Facility: CLINIC | Age: 55
End: 2025-05-28
Payer: MEDICAID

## 2025-05-28 ENCOUNTER — PATIENT MESSAGE (OUTPATIENT)
Dept: SPORTS MEDICINE | Facility: CLINIC | Age: 55
End: 2025-05-28

## 2025-05-28 NOTE — TELEPHONE ENCOUNTER
Spoke with patient and scheduled the ER follow up. Patient verbalized understanding of appointment date, time, and location.

## 2025-05-28 NOTE — TELEPHONE ENCOUNTER
----- Message from Deuce Arce PA-C sent at 5/28/2025  2:42 PM CDT -----  Regarding: RE: ER Referral  Dr. Pfeiffer next week  ----- Message -----  From: Christine Waller MA  Sent: 5/28/2025   9:37 AM CDT  To: Shelby Soto MA; Deuce Arce PA-C; [facility]  Subject: FW: ER Referral                                  When should patient follow up in clinic?  ----- Message -----  From: Tomasa Zaragoza  Sent: 5/28/2025   8:00 AM CDT  To: Claude Tripathi Staff; Kentrell Giles Staff  Subject: ER Referral                                       Acute pain of right shoulder

## 2025-05-29 ENCOUNTER — PATIENT MESSAGE (OUTPATIENT)
Dept: URGENT CARE | Facility: CLINIC | Age: 55
End: 2025-05-29

## 2025-05-29 ENCOUNTER — NURSE TRIAGE (OUTPATIENT)
Dept: ADMINISTRATIVE | Facility: CLINIC | Age: 55
End: 2025-05-29
Payer: MEDICAID

## 2025-05-29 ENCOUNTER — TELEMEDICINE (OUTPATIENT)
Dept: PRIMARY CARE CLINIC | Facility: CLINIC | Age: 55
End: 2025-05-29

## 2025-05-29 NOTE — TELEPHONE ENCOUNTER
Having severe pain from head to toe. Fell day before yesterday. Advised to be seen within 4 hours. Was unable to schedule an appt. Please call patient if you are able to see her this morning.   Reason for Disposition   [1] SEVERE pain AND [2] not improved 2 hours after pain medicine/ice packs    Additional Information   Negative: Serious injury with multiple fractures (broken bones)   Negative: [1] Major bleeding (e.g., actively dripping or spurting) AND [2] can't be stopped   Negative: Bullet wound, stabbed by knife, or other serious penetrating wound   Negative: Sounds like a life-threatening emergency to the triager   Negative: Looks like a broken bone (crooked or deformed)   Negative: Looks like a dislocated joint   Negative: Can't move injured shoulder at all   Negative: Skin is split open or gaping (or length > 1/2 inch or 12 mm)   Negative: [1] Bleeding AND [2] won't stop after 10 minutes of direct pressure (using correct technique)   Negative: [1] Dirt in the wound AND [2] not removed with 15 minutes of scrubbing   Negative: [1] New numbness (loss of sensation) or weakness of hand or finger(s) AND [2] present now   Negative: Sounds like a serious injury to the triager    Protocols used: Shoulder Injury-A-AH

## 2025-05-30 ENCOUNTER — RESULTS FOLLOW-UP (OUTPATIENT)
Dept: HEMATOLOGY/ONCOLOGY | Facility: CLINIC | Age: 55
End: 2025-05-30

## 2025-05-30 ENCOUNTER — INFUSION (OUTPATIENT)
Dept: INFUSION THERAPY | Facility: HOSPITAL | Age: 55
End: 2025-05-30
Attending: INTERNAL MEDICINE
Payer: MEDICAID

## 2025-05-30 ENCOUNTER — LAB VISIT (OUTPATIENT)
Dept: LAB | Facility: HOSPITAL | Age: 55
End: 2025-05-30
Attending: INTERNAL MEDICINE

## 2025-05-30 VITALS
DIASTOLIC BLOOD PRESSURE: 80 MMHG | OXYGEN SATURATION: 96 % | RESPIRATION RATE: 18 BRPM | SYSTOLIC BLOOD PRESSURE: 128 MMHG | TEMPERATURE: 98 F | HEART RATE: 83 BPM

## 2025-05-30 DIAGNOSIS — D50.8 IRON DEFICIENCY ANEMIA SECONDARY TO INADEQUATE DIETARY IRON INTAKE: ICD-10-CM

## 2025-05-30 DIAGNOSIS — D50.0 IRON DEFICIENCY ANEMIA DUE TO CHRONIC BLOOD LOSS: Primary | ICD-10-CM

## 2025-05-30 LAB
ABSOLUTE EOSINOPHIL (OHS): 0.09 K/UL
ABSOLUTE EOSINOPHIL (OHS): 0.11 K/UL
ABSOLUTE MONOCYTE (OHS): 0.41 K/UL (ref 0.3–1)
ABSOLUTE MONOCYTE (OHS): 0.46 K/UL (ref 0.3–1)
ABSOLUTE NEUTROPHIL COUNT (OHS): 4.79 K/UL (ref 1.8–7.7)
ABSOLUTE NEUTROPHIL COUNT (OHS): 4.9 K/UL (ref 1.8–7.7)
ALBUMIN SERPL BCP-MCNC: 3.5 G/DL (ref 3.5–5.2)
ALP SERPL-CCNC: 103 UNIT/L (ref 40–150)
ALT SERPL W/O P-5'-P-CCNC: 36 UNIT/L (ref 10–44)
ANION GAP (OHS): 8 MMOL/L (ref 8–16)
AST SERPL-CCNC: 37 UNIT/L (ref 11–45)
BASOPHILS # BLD AUTO: 0.03 K/UL
BASOPHILS # BLD AUTO: 0.05 K/UL
BASOPHILS NFR BLD AUTO: 0.4 %
BASOPHILS NFR BLD AUTO: 0.6 %
BILIRUB SERPL-MCNC: 0.2 MG/DL (ref 0.1–1)
BUN SERPL-MCNC: 10 MG/DL (ref 6–20)
CALCIUM SERPL-MCNC: 9.4 MG/DL (ref 8.7–10.5)
CHLORIDE SERPL-SCNC: 100 MMOL/L (ref 95–110)
CO2 SERPL-SCNC: 26 MMOL/L (ref 23–29)
CREAT SERPL-MCNC: 0.8 MG/DL (ref 0.5–1.4)
ERYTHROCYTE [DISTWIDTH] IN BLOOD BY AUTOMATED COUNT: 17.5 % (ref 11.5–14.5)
ERYTHROCYTE [DISTWIDTH] IN BLOOD BY AUTOMATED COUNT: 17.6 % (ref 11.5–14.5)
FERRITIN SERPL-MCNC: 40 NG/ML (ref 20–300)
GFR SERPLBLD CREATININE-BSD FMLA CKD-EPI: >60 ML/MIN/1.73/M2
GLUCOSE SERPL-MCNC: 247 MG/DL (ref 70–110)
HCT VFR BLD AUTO: 32.5 % (ref 37–48.5)
HCT VFR BLD AUTO: 32.7 % (ref 37–48.5)
HGB BLD-MCNC: 9.6 GM/DL (ref 12–16)
HGB BLD-MCNC: 9.7 GM/DL (ref 12–16)
IMM GRANULOCYTES # BLD AUTO: 0.05 K/UL (ref 0–0.04)
IMM GRANULOCYTES # BLD AUTO: 0.06 K/UL (ref 0–0.04)
IMM GRANULOCYTES NFR BLD AUTO: 0.7 % (ref 0–0.5)
IMM GRANULOCYTES NFR BLD AUTO: 0.8 % (ref 0–0.5)
IRON SATN MFR SERPL: 5 % (ref 20–50)
IRON SERPL-MCNC: 26 UG/DL (ref 30–160)
LYMPHOCYTES # BLD AUTO: 2.29 K/UL (ref 1–4.8)
LYMPHOCYTES # BLD AUTO: 2.33 K/UL (ref 1–4.8)
MCH RBC QN AUTO: 22.6 PG (ref 27–31)
MCH RBC QN AUTO: 22.8 PG (ref 27–31)
MCHC RBC AUTO-ENTMCNC: 29.5 G/DL (ref 32–36)
MCHC RBC AUTO-ENTMCNC: 29.7 G/DL (ref 32–36)
MCV RBC AUTO: 77 FL (ref 82–98)
MCV RBC AUTO: 77 FL (ref 82–98)
NUCLEATED RBC (/100WBC) (OHS): 0 /100 WBC
NUCLEATED RBC (/100WBC) (OHS): 0 /100 WBC
PLATELET # BLD AUTO: 313 K/UL (ref 150–450)
PLATELET # BLD AUTO: 324 K/UL (ref 150–450)
PMV BLD AUTO: 9.4 FL (ref 9.2–12.9)
PMV BLD AUTO: 9.6 FL (ref 9.2–12.9)
POTASSIUM SERPL-SCNC: 4.5 MMOL/L (ref 3.5–5.1)
PROT SERPL-MCNC: 7.4 GM/DL (ref 6–8.4)
RBC # BLD AUTO: 4.24 M/UL (ref 4–5.4)
RBC # BLD AUTO: 4.25 M/UL (ref 4–5.4)
RELATIVE EOSINOPHIL (OHS): 1.1 %
RELATIVE EOSINOPHIL (OHS): 1.4 %
RELATIVE LYMPHOCYTE (OHS): 29.5 % (ref 18–48)
RELATIVE LYMPHOCYTE (OHS): 29.8 % (ref 18–48)
RELATIVE MONOCYTE (OHS): 5.3 % (ref 4–15)
RELATIVE MONOCYTE (OHS): 5.8 % (ref 4–15)
RELATIVE NEUTROPHIL (OHS): 62.2 % (ref 38–73)
RELATIVE NEUTROPHIL (OHS): 62.4 % (ref 38–73)
SODIUM SERPL-SCNC: 134 MMOL/L (ref 136–145)
TIBC SERPL-MCNC: 487 UG/DL (ref 250–450)
TRANSFERRIN SERPL-MCNC: 329 MG/DL (ref 200–375)
WBC # BLD AUTO: 7.68 K/UL (ref 3.9–12.7)
WBC # BLD AUTO: 7.89 K/UL (ref 3.9–12.7)

## 2025-05-30 PROCEDURE — 63600175 PHARM REV CODE 636 W HCPCS: Performed by: INTERNAL MEDICINE

## 2025-05-30 PROCEDURE — 96375 TX/PRO/DX INJ NEW DRUG ADDON: CPT

## 2025-05-30 PROCEDURE — 84466 ASSAY OF TRANSFERRIN: CPT

## 2025-05-30 PROCEDURE — 85025 COMPLETE CBC W/AUTO DIFF WBC: CPT

## 2025-05-30 PROCEDURE — 63600175 PHARM REV CODE 636 W HCPCS: Mod: JZ,TB | Performed by: NURSE PRACTITIONER

## 2025-05-30 PROCEDURE — 96374 THER/PROPH/DIAG INJ IV PUSH: CPT

## 2025-05-30 PROCEDURE — 80053 COMPREHEN METABOLIC PANEL: CPT

## 2025-05-30 PROCEDURE — 36415 COLL VENOUS BLD VENIPUNCTURE: CPT

## 2025-05-30 PROCEDURE — 82728 ASSAY OF FERRITIN: CPT

## 2025-05-30 RX ORDER — EPINEPHRINE 0.3 MG/.3ML
0.3 INJECTION SUBCUTANEOUS ONCE AS NEEDED
OUTPATIENT
Start: 2025-06-06

## 2025-05-30 RX ORDER — EPINEPHRINE 0.3 MG/.3ML
0.3 INJECTION SUBCUTANEOUS ONCE AS NEEDED
Status: DISCONTINUED | OUTPATIENT
Start: 2025-05-30 | End: 2025-05-30 | Stop reason: HOSPADM

## 2025-05-30 RX ORDER — SODIUM FERRIC GLUCONATE COMPLEX IN SUCROSE 12.5 MG/ML
125 INJECTION INTRAVENOUS
OUTPATIENT
Start: 2025-06-06

## 2025-05-30 RX ORDER — SODIUM FERRIC GLUCONATE COMPLEX IN SUCROSE 12.5 MG/ML
125 INJECTION INTRAVENOUS
Status: COMPLETED | OUTPATIENT
Start: 2025-05-30 | End: 2025-05-30

## 2025-05-30 RX ORDER — SODIUM CHLORIDE 0.9 % (FLUSH) 0.9 %
10 SYRINGE (ML) INJECTION
OUTPATIENT
Start: 2025-06-06

## 2025-05-30 RX ORDER — HEPARIN 100 UNIT/ML
500 SYRINGE INTRAVENOUS
OUTPATIENT
Start: 2025-06-06

## 2025-05-30 RX ADMIN — SODIUM FERRIC GLUCONATE COMPLEX IN SUCROSE 125 MG: 12.5 INJECTION INTRAVENOUS at 02:05

## 2025-05-30 RX ADMIN — METHYLPREDNISOLONE SODIUM SUCCINATE 80 MG: 40 INJECTION, POWDER, FOR SOLUTION INTRAMUSCULAR; INTRAVENOUS at 02:05

## 2025-05-30 NOTE — PLAN OF CARE
Problem: Adult Inpatient Plan of Care  Goal: Plan of Care Review  Outcome: Progressing  Flowsheets (Taken 5/30/2025 1416)  Plan of Care Reviewed With: patient  Goal: Patient-Specific Goal (Individualized)  Outcome: Progressing  Flowsheets (Taken 5/30/2025 1416)  Individualized Care Needs: recline with pillow and blanket, refreshments  Anxieties, Fears or Concerns: none today  Patient/Family-Specific Goals (Include Timeframe): tolerate first dose of IV Ferrlecit today  Goal: Absence of Hospital-Acquired Illness or Injury  Outcome: Progressing  Intervention: Prevent Infection  Flowsheets (Taken 5/30/2025 1416)  Infection Prevention:   equipment surfaces disinfected   hand hygiene promoted   rest/sleep promoted   personal protective equipment utilized  Goal: Optimal Comfort and Wellbeing  Outcome: Progressing  Intervention: Provide Person-Centered Care  Flowsheets (Taken 5/30/2025 1416)  Trust Relationship/Rapport:   choices provided   thoughts/feelings acknowledged   care explained   emotional support provided   empathic listening provided   questions answered   questions encouraged   reassurance provided     Problem: Anemia  Goal: Anemia Symptom Improvement  Outcome: Progressing  Intervention: Monitor and Manage Anemia  Flowsheets (Taken 5/30/2025 1416)  Safety Promotion/Fall Prevention:   assistive device/personal item within reach   medications reviewed   in recliner, wheels locked   instructed to call staff for mobility   patient expresses understanding of fall risk and prevention  Fatigue Management:   fatigue-related activity identified   frequent rest breaks encouraged   paced activity encouraged

## 2025-05-30 NOTE — NURSING
Pt tolerated Ferrlecit ivp well. No adverse reaction noted. Pt education reinforced on possible side effects, what to expect, and when to call . Pt verbalized understanding. I reviewed pt calendar w/ pt and understanding verbalized. After 30 minute observation, IV flushed w/ NS and D/C per protocol.

## 2025-06-02 ENCOUNTER — OFFICE VISIT (OUTPATIENT)
Dept: PSYCHIATRY | Facility: CLINIC | Age: 55
End: 2025-06-02
Payer: MEDICARE

## 2025-06-02 ENCOUNTER — OFFICE VISIT (OUTPATIENT)
Dept: ORTHOPEDICS | Facility: CLINIC | Age: 55
End: 2025-06-02
Payer: MEDICARE

## 2025-06-02 VITALS
HEART RATE: 96 BPM | DIASTOLIC BLOOD PRESSURE: 74 MMHG | SYSTOLIC BLOOD PRESSURE: 103 MMHG | WEIGHT: 196 LBS | HEIGHT: 56 IN | BODY MASS INDEX: 44.09 KG/M2

## 2025-06-02 DIAGNOSIS — M54.12 CERVICAL RADICULOPATHY: Primary | ICD-10-CM

## 2025-06-02 DIAGNOSIS — F41.1 GENERALIZED ANXIETY DISORDER: Chronic | ICD-10-CM

## 2025-06-02 DIAGNOSIS — F25.0 SCHIZOAFFECTIVE DISORDER, BIPOLAR TYPE: Primary | Chronic | ICD-10-CM

## 2025-06-02 DIAGNOSIS — M25.511 ACUTE PAIN OF RIGHT SHOULDER: ICD-10-CM

## 2025-06-02 DIAGNOSIS — F51.05 INSOMNIA DUE TO OTHER MENTAL DISORDER: ICD-10-CM

## 2025-06-02 DIAGNOSIS — F99 INSOMNIA DUE TO OTHER MENTAL DISORDER: ICD-10-CM

## 2025-06-02 DIAGNOSIS — R41.3 MEMORY DIFFICULTY: ICD-10-CM

## 2025-06-02 PROCEDURE — 1160F RVW MEDS BY RX/DR IN RCRD: CPT | Mod: CPTII,S$GLB,, | Performed by: ORTHOPAEDIC SURGERY

## 2025-06-02 PROCEDURE — 3072F LOW RISK FOR RETINOPATHY: CPT | Mod: CPTII,S$GLB,, | Performed by: ORTHOPAEDIC SURGERY

## 2025-06-02 PROCEDURE — 99999 PR PBB SHADOW E&M-EST. PATIENT-LVL V: CPT | Mod: PBBFAC,,, | Performed by: ORTHOPAEDIC SURGERY

## 2025-06-02 PROCEDURE — 98006 SYNCH AUDIO-VIDEO EST MOD 30: CPT | Mod: 95,,, | Performed by: PSYCHOLOGIST

## 2025-06-02 PROCEDURE — 90833 PSYTX W PT W E/M 30 MIN: CPT | Mod: 95,,, | Performed by: PSYCHOLOGIST

## 2025-06-02 PROCEDURE — 3078F DIAST BP <80 MM HG: CPT | Mod: CPTII,S$GLB,, | Performed by: ORTHOPAEDIC SURGERY

## 2025-06-02 PROCEDURE — 3052F HG A1C>EQUAL 8.0%<EQUAL 9.0%: CPT | Mod: CPTII,S$GLB,, | Performed by: ORTHOPAEDIC SURGERY

## 2025-06-02 PROCEDURE — G2211 COMPLEX E/M VISIT ADD ON: HCPCS | Mod: 95,,, | Performed by: PSYCHOLOGIST

## 2025-06-02 PROCEDURE — 1159F MED LIST DOCD IN RCRD: CPT | Mod: CPTII,S$GLB,, | Performed by: ORTHOPAEDIC SURGERY

## 2025-06-02 PROCEDURE — 1111F DSCHRG MED/CURRENT MED MERGE: CPT | Mod: CPTII,S$GLB,, | Performed by: ORTHOPAEDIC SURGERY

## 2025-06-02 PROCEDURE — 3008F BODY MASS INDEX DOCD: CPT | Mod: CPTII,S$GLB,, | Performed by: ORTHOPAEDIC SURGERY

## 2025-06-02 PROCEDURE — 3074F SYST BP LT 130 MM HG: CPT | Mod: CPTII,S$GLB,, | Performed by: ORTHOPAEDIC SURGERY

## 2025-06-02 RX ORDER — HALOPERIDOL 2 MG/1
2 TABLET ORAL 2 TIMES DAILY
Qty: 60 TABLET | Refills: 2 | Status: SHIPPED | OUTPATIENT
Start: 2025-06-02 | End: 2025-08-31

## 2025-06-02 RX ORDER — DULOXETIN HYDROCHLORIDE 60 MG/1
60 CAPSULE, DELAYED RELEASE ORAL 2 TIMES DAILY
Qty: 60 CAPSULE | Refills: 1 | Status: SHIPPED | OUTPATIENT
Start: 2025-06-02 | End: 2025-08-01

## 2025-06-03 ENCOUNTER — OFFICE VISIT (OUTPATIENT)
Dept: OPHTHALMOLOGY | Facility: CLINIC | Age: 55
End: 2025-06-03
Payer: MEDICARE

## 2025-06-03 DIAGNOSIS — H25.13 NUCLEAR SCLEROSIS, BILATERAL: ICD-10-CM

## 2025-06-03 DIAGNOSIS — E11.9 TYPE 2 DIABETES MELLITUS WITHOUT RETINOPATHY: Primary | ICD-10-CM

## 2025-06-03 DIAGNOSIS — G40.409 MYOCLONIC SEIZURE: ICD-10-CM

## 2025-06-03 DIAGNOSIS — H04.129 DRY EYE: ICD-10-CM

## 2025-06-03 DIAGNOSIS — E11.36 DIABETIC CATARACT OF BOTH EYES: ICD-10-CM

## 2025-06-03 DIAGNOSIS — H40.013 OPEN ANGLE WITH BORDERLINE FINDINGS OF BOTH EYES: ICD-10-CM

## 2025-06-03 DIAGNOSIS — S06.5XAA SDH (SUBDURAL HEMATOMA): ICD-10-CM

## 2025-06-03 PROCEDURE — 1111F DSCHRG MED/CURRENT MED MERGE: CPT | Mod: CPTII,S$GLB,, | Performed by: OPTOMETRIST

## 2025-06-03 PROCEDURE — 92083 EXTENDED VISUAL FIELD XM: CPT | Mod: S$GLB,,, | Performed by: OPTOMETRIST

## 2025-06-03 PROCEDURE — 1160F RVW MEDS BY RX/DR IN RCRD: CPT | Mod: CPTII,S$GLB,, | Performed by: OPTOMETRIST

## 2025-06-03 PROCEDURE — 99999 PR PBB SHADOW E&M-EST. PATIENT-LVL III: CPT | Mod: PBBFAC,,, | Performed by: OPTOMETRIST

## 2025-06-03 PROCEDURE — 92133 CPTRZD OPH DX IMG PST SGM ON: CPT | Mod: S$GLB,,, | Performed by: OPTOMETRIST

## 2025-06-03 PROCEDURE — 3052F HG A1C>EQUAL 8.0%<EQUAL 9.0%: CPT | Mod: CPTII,S$GLB,, | Performed by: OPTOMETRIST

## 2025-06-03 PROCEDURE — 99214 OFFICE O/P EST MOD 30 MIN: CPT | Mod: S$GLB,,, | Performed by: OPTOMETRIST

## 2025-06-03 PROCEDURE — 1159F MED LIST DOCD IN RCRD: CPT | Mod: CPTII,S$GLB,, | Performed by: OPTOMETRIST

## 2025-06-03 PROCEDURE — 2023F DILAT RTA XM W/O RTNOPTHY: CPT | Mod: CPTII,S$GLB,, | Performed by: OPTOMETRIST

## 2025-06-04 ENCOUNTER — OFFICE VISIT (OUTPATIENT)
Dept: OBSTETRICS AND GYNECOLOGY | Facility: CLINIC | Age: 55
End: 2025-06-04
Payer: MEDICARE

## 2025-06-04 ENCOUNTER — CLINICAL SUPPORT (OUTPATIENT)
Dept: DIABETES | Facility: CLINIC | Age: 55
End: 2025-06-04
Payer: MEDICARE

## 2025-06-04 VITALS
SYSTOLIC BLOOD PRESSURE: 108 MMHG | WEIGHT: 197.06 LBS | BODY MASS INDEX: 44.33 KG/M2 | HEIGHT: 56 IN | DIASTOLIC BLOOD PRESSURE: 76 MMHG

## 2025-06-04 VITALS — WEIGHT: 198.19 LBS | BODY MASS INDEX: 44.43 KG/M2

## 2025-06-04 DIAGNOSIS — N89.8 VAGINAL ODOR: ICD-10-CM

## 2025-06-04 DIAGNOSIS — N77.1 VAGINITIS, VULVITIS AND VULVOVAGINITIS IN DISEASES CLASSIFIED ELSEWHERE: Primary | ICD-10-CM

## 2025-06-04 DIAGNOSIS — Z79.4 TYPE 2 DIABETES MELLITUS WITH HYPERGLYCEMIA, WITH LONG-TERM CURRENT USE OF INSULIN: Primary | Chronic | ICD-10-CM

## 2025-06-04 DIAGNOSIS — E11.65 TYPE 2 DIABETES MELLITUS WITH HYPERGLYCEMIA, WITH LONG-TERM CURRENT USE OF INSULIN: Primary | Chronic | ICD-10-CM

## 2025-06-04 PROCEDURE — 81515 NFCT DS BV&VAGINITIS DNA ALG: CPT | Performed by: NURSE PRACTITIONER

## 2025-06-04 PROCEDURE — 99999 PR PBB SHADOW E&M-EST. PATIENT-LVL V: CPT | Mod: PBBFAC,,, | Performed by: NURSE PRACTITIONER

## 2025-06-04 PROCEDURE — 99999 PR PBB SHADOW E&M-EST. PATIENT-LVL IV: CPT | Mod: PBBFAC,,, | Performed by: DIETITIAN, REGISTERED

## 2025-06-04 PROCEDURE — G0108 DIAB MANAGE TRN  PER INDIV: HCPCS | Mod: S$GLB,,, | Performed by: DIETITIAN, REGISTERED

## 2025-06-04 RX ORDER — PEN NEEDLE, DIABETIC 31 GX5/16"
NEEDLE, DISPOSABLE MISCELLANEOUS
Qty: 100 EACH | Refills: 1 | Status: SHIPPED | OUTPATIENT
Start: 2025-06-04

## 2025-06-04 RX ORDER — FLUCONAZOLE 150 MG/1
TABLET ORAL
Qty: 2 TABLET | Refills: 1 | Status: SHIPPED | OUTPATIENT
Start: 2025-06-04

## 2025-06-04 RX ORDER — CLOTRIMAZOLE AND BETAMETHASONE DIPROPIONATE 10; .64 MG/G; MG/G
CREAM TOPICAL 2 TIMES DAILY
Qty: 45 G | Refills: 1 | Status: SHIPPED | OUTPATIENT
Start: 2025-06-04

## 2025-06-05 LAB
BACTERIAL VAGINOSIS DNA (OHS): NOT DETECTED
CANDIDA GLABRATA/KRUSEI DNA (OHS): DETECTED
CANDIDA SPECIES DNA (OHS): NOT DETECTED
TRICHOMONAS VAGINALIS DNA (OHS): NOT DETECTED

## 2025-06-06 ENCOUNTER — INFUSION (OUTPATIENT)
Dept: INFUSION THERAPY | Facility: HOSPITAL | Age: 55
End: 2025-06-06
Attending: INTERNAL MEDICINE
Payer: MEDICARE

## 2025-06-06 ENCOUNTER — TELEPHONE (OUTPATIENT)
Dept: DIABETES | Facility: CLINIC | Age: 55
End: 2025-06-06
Payer: MEDICARE

## 2025-06-06 ENCOUNTER — NURSE TRIAGE (OUTPATIENT)
Dept: ADMINISTRATIVE | Facility: CLINIC | Age: 55
End: 2025-06-06
Payer: MEDICARE

## 2025-06-06 ENCOUNTER — RESULTS FOLLOW-UP (OUTPATIENT)
Dept: OBSTETRICS AND GYNECOLOGY | Facility: CLINIC | Age: 55
End: 2025-06-06
Payer: MEDICARE

## 2025-06-06 VITALS
TEMPERATURE: 98 F | OXYGEN SATURATION: 96 % | DIASTOLIC BLOOD PRESSURE: 80 MMHG | SYSTOLIC BLOOD PRESSURE: 127 MMHG | HEART RATE: 96 BPM | RESPIRATION RATE: 16 BRPM

## 2025-06-06 DIAGNOSIS — D50.0 IRON DEFICIENCY ANEMIA DUE TO CHRONIC BLOOD LOSS: Primary | ICD-10-CM

## 2025-06-06 DIAGNOSIS — M45.9 ANKYLOSING SPONDYLITIS, UNSPECIFIED SITE OF SPINE: Primary | ICD-10-CM

## 2025-06-06 PROCEDURE — A4216 STERILE WATER/SALINE, 10 ML: HCPCS | Performed by: INTERNAL MEDICINE

## 2025-06-06 PROCEDURE — 63600175 PHARM REV CODE 636 W HCPCS: Mod: JZ,TB | Performed by: NURSE PRACTITIONER

## 2025-06-06 PROCEDURE — 96375 TX/PRO/DX INJ NEW DRUG ADDON: CPT

## 2025-06-06 PROCEDURE — 63600175 PHARM REV CODE 636 W HCPCS: Performed by: INTERNAL MEDICINE

## 2025-06-06 PROCEDURE — 25000003 PHARM REV CODE 250: Performed by: INTERNAL MEDICINE

## 2025-06-06 PROCEDURE — 96374 THER/PROPH/DIAG INJ IV PUSH: CPT

## 2025-06-06 RX ORDER — HEPARIN 100 UNIT/ML
500 SYRINGE INTRAVENOUS
OUTPATIENT
Start: 2025-06-13

## 2025-06-06 RX ORDER — SODIUM FERRIC GLUCONATE COMPLEX IN SUCROSE 12.5 MG/ML
125 INJECTION INTRAVENOUS
Status: COMPLETED | OUTPATIENT
Start: 2025-06-06 | End: 2025-06-06

## 2025-06-06 RX ORDER — SODIUM FERRIC GLUCONATE COMPLEX IN SUCROSE 12.5 MG/ML
125 INJECTION INTRAVENOUS
OUTPATIENT
Start: 2025-06-13

## 2025-06-06 RX ORDER — EPINEPHRINE 0.3 MG/.3ML
0.3 INJECTION SUBCUTANEOUS ONCE AS NEEDED
OUTPATIENT
Start: 2025-06-13

## 2025-06-06 RX ORDER — IXEKIZUMAB 80 MG/ML
80 INJECTION, SOLUTION SUBCUTANEOUS
Qty: 2 ML | Refills: 3 | Status: ACTIVE | OUTPATIENT
Start: 2025-06-06

## 2025-06-06 RX ORDER — SODIUM CHLORIDE 0.9 % (FLUSH) 0.9 %
10 SYRINGE (ML) INJECTION
OUTPATIENT
Start: 2025-06-13

## 2025-06-06 RX ORDER — TERCONAZOLE 4 MG/G
1 CREAM VAGINAL DAILY
Qty: 45 G | Refills: 1 | Status: SHIPPED | OUTPATIENT
Start: 2025-06-06

## 2025-06-06 RX ORDER — SODIUM CHLORIDE 0.9 % (FLUSH) 0.9 %
10 SYRINGE (ML) INJECTION
Status: DISCONTINUED | OUTPATIENT
Start: 2025-06-06 | End: 2025-06-06 | Stop reason: HOSPADM

## 2025-06-06 RX ADMIN — METHYLPREDNISOLONE SODIUM SUCCINATE 80 MG: 40 INJECTION, POWDER, FOR SOLUTION INTRAMUSCULAR; INTRAVENOUS at 02:06

## 2025-06-06 RX ADMIN — SODIUM FERRIC GLUCONATE COMPLEX IN SUCROSE 125 MG: 12.5 INJECTION INTRAVENOUS at 02:06

## 2025-06-06 RX ADMIN — Medication 10 ML: at 02:06

## 2025-06-07 ENCOUNTER — NURSE TRIAGE (OUTPATIENT)
Dept: ADMINISTRATIVE | Facility: CLINIC | Age: 55
End: 2025-06-07
Payer: MEDICARE

## 2025-06-07 ENCOUNTER — HOSPITAL ENCOUNTER (EMERGENCY)
Facility: HOSPITAL | Age: 55
Discharge: HOME | End: 2025-06-07
Attending: FAMILY MEDICINE
Payer: MEDICARE

## 2025-06-07 VITALS
TEMPERATURE: 98 F | RESPIRATION RATE: 18 BRPM | DIASTOLIC BLOOD PRESSURE: 79 MMHG | HEART RATE: 96 BPM | SYSTOLIC BLOOD PRESSURE: 139 MMHG | OXYGEN SATURATION: 96 %

## 2025-06-07 DIAGNOSIS — R73.9 HYPERGLYCEMIA: Primary | ICD-10-CM

## 2025-06-07 LAB
ABSOLUTE EOSINOPHIL (OHS): 0 K/UL
ABSOLUTE MONOCYTE (OHS): 0.16 K/UL (ref 0.3–1)
ABSOLUTE NEUTROPHIL COUNT (OHS): 8.67 K/UL (ref 1.8–7.7)
ALBUMIN SERPL BCP-MCNC: 3.5 G/DL (ref 3.5–5.2)
ALP SERPL-CCNC: 120 UNIT/L (ref 40–150)
ALT SERPL W/O P-5'-P-CCNC: 25 UNIT/L (ref 10–44)
ANION GAP (OHS): 14 MMOL/L (ref 8–16)
AST SERPL-CCNC: 15 UNIT/L (ref 11–45)
B-OH-BUTYR BLD STRIP-SCNC: 0.1 MMOL/L
BACTERIA #/AREA URNS AUTO: NORMAL /HPF
BASOPHILS # BLD AUTO: 0.02 K/UL
BASOPHILS NFR BLD AUTO: 0.2 %
BILIRUB SERPL-MCNC: 0.2 MG/DL (ref 0.1–1)
BILIRUB UR QL STRIP.AUTO: NEGATIVE
BUN SERPL-MCNC: 14 MG/DL (ref 6–20)
CALCIUM SERPL-MCNC: 9.7 MG/DL (ref 8.7–10.5)
CHLORIDE SERPL-SCNC: 96 MMOL/L (ref 95–110)
CLARITY UR: CLEAR
CO2 SERPL-SCNC: 21 MMOL/L (ref 23–29)
COLOR UR AUTO: YELLOW
CREAT SERPL-MCNC: 0.9 MG/DL (ref 0.5–1.4)
ERYTHROCYTE [DISTWIDTH] IN BLOOD BY AUTOMATED COUNT: 18 % (ref 11.5–14.5)
GFR SERPLBLD CREATININE-BSD FMLA CKD-EPI: >60 ML/MIN/1.73/M2
GLUCOSE SERPL-MCNC: 379 MG/DL (ref 70–110)
GLUCOSE UR QL STRIP: ABNORMAL
HCT VFR BLD AUTO: 32.1 % (ref 37–48.5)
HGB BLD-MCNC: 9.5 GM/DL (ref 12–16)
HGB UR QL STRIP: NEGATIVE
HOLD SPECIMEN: NORMAL
HYALINE CASTS UR QL AUTO: 1 /LPF (ref 0–1)
IMM GRANULOCYTES # BLD AUTO: 0.15 K/UL (ref 0–0.04)
IMM GRANULOCYTES NFR BLD AUTO: 1.4 % (ref 0–0.5)
KETONES UR QL STRIP: NEGATIVE
LEUKOCYTE ESTERASE UR QL STRIP: NEGATIVE
LYMPHOCYTES # BLD AUTO: 1.48 K/UL (ref 1–4.8)
MCH RBC QN AUTO: 22.9 PG (ref 27–31)
MCHC RBC AUTO-ENTMCNC: 29.6 G/DL (ref 32–36)
MCV RBC AUTO: 77 FL (ref 82–98)
MICROSCOPIC COMMENT: NORMAL
NITRITE UR QL STRIP: NEGATIVE
NUCLEATED RBC (/100WBC) (OHS): 0 /100 WBC
OHS QRS DURATION: 76 MS
OHS QTC CALCULATION: 405 MS
PH UR STRIP: 6 [PH]
PLATELET # BLD AUTO: 353 K/UL (ref 150–450)
PMV BLD AUTO: 9.4 FL (ref 9.2–12.9)
POCT GLUCOSE: 214 MG/DL (ref 70–110)
POCT GLUCOSE: 253 MG/DL (ref 70–110)
POCT GLUCOSE: 290 MG/DL (ref 70–110)
POCT GLUCOSE: 298 MG/DL (ref 70–110)
POTASSIUM SERPL-SCNC: 5.1 MMOL/L (ref 3.5–5.1)
PROT SERPL-MCNC: 7.7 GM/DL (ref 6–8.4)
PROT UR QL STRIP: NEGATIVE
RBC # BLD AUTO: 4.15 M/UL (ref 4–5.4)
RBC #/AREA URNS AUTO: 1 /HPF (ref 0–4)
RELATIVE EOSINOPHIL (OHS): 0 %
RELATIVE LYMPHOCYTE (OHS): 14.1 % (ref 18–48)
RELATIVE MONOCYTE (OHS): 1.5 % (ref 4–15)
RELATIVE NEUTROPHIL (OHS): 82.8 % (ref 38–73)
SODIUM SERPL-SCNC: 131 MMOL/L (ref 136–145)
SP GR UR STRIP: >=1.03
UROBILINOGEN UR STRIP-ACNC: NEGATIVE EU/DL
WBC # BLD AUTO: 10.48 K/UL (ref 3.9–12.7)
WBC #/AREA URNS AUTO: 0 /HPF (ref 0–5)
WBC CLUMPS UR QL AUTO: NORMAL
YEAST UR QL AUTO: NORMAL /HPF

## 2025-06-07 PROCEDURE — 82962 GLUCOSE BLOOD TEST: CPT

## 2025-06-07 PROCEDURE — 96376 TX/PRO/DX INJ SAME DRUG ADON: CPT

## 2025-06-07 PROCEDURE — 93010 ELECTROCARDIOGRAM REPORT: CPT | Mod: ,,, | Performed by: INTERNAL MEDICINE

## 2025-06-07 PROCEDURE — 80053 COMPREHEN METABOLIC PANEL: CPT

## 2025-06-07 PROCEDURE — 63600175 PHARM REV CODE 636 W HCPCS: Performed by: FAMILY MEDICINE

## 2025-06-07 PROCEDURE — 93005 ELECTROCARDIOGRAM TRACING: CPT

## 2025-06-07 PROCEDURE — 85025 COMPLETE CBC W/AUTO DIFF WBC: CPT

## 2025-06-07 PROCEDURE — 96374 THER/PROPH/DIAG INJ IV PUSH: CPT

## 2025-06-07 PROCEDURE — 99285 EMERGENCY DEPT VISIT HI MDM: CPT | Mod: 25

## 2025-06-07 PROCEDURE — 81001 URINALYSIS AUTO W/SCOPE: CPT

## 2025-06-07 PROCEDURE — 82010 KETONE BODYS QUAN: CPT

## 2025-06-07 RX ORDER — ONDANSETRON HYDROCHLORIDE 2 MG/ML
INJECTION, SOLUTION INTRAVENOUS
Status: DISCONTINUED
Start: 2025-06-07 | End: 2025-06-07 | Stop reason: HOSPADM

## 2025-06-07 RX ORDER — ONDANSETRON HYDROCHLORIDE 2 MG/ML
4 INJECTION, SOLUTION INTRAVENOUS
Status: DISCONTINUED | OUTPATIENT
Start: 2025-06-07 | End: 2025-06-07 | Stop reason: HOSPADM

## 2025-06-07 RX ADMIN — HUMAN INSULIN 10 UNITS: 100 INJECTION, SOLUTION SUBCUTANEOUS at 07:06

## 2025-06-07 RX ADMIN — HUMAN INSULIN 10 UNITS: 100 INJECTION, SOLUTION SUBCUTANEOUS at 08:06

## 2025-06-07 NOTE — TELEPHONE ENCOUNTER
Reason for Disposition   Blood glucose > 400 mg/dL (22.2 mmol/L)    Additional Information   Negative: Unconscious or difficult to awaken   Negative: Acting confused (e.g., disoriented, slurred speech)   Negative: Very weak (e.g., can't stand)   Negative: Sounds like a life-threatening emergency to the triager   Negative: [1] Vomiting AND [2] signs of dehydration (e.g., very dry mouth, lightheaded, dark urine)   Negative: [1] Blood glucose > 240 mg/dL (13.3 mmol/L) AND [2] rapid breathing   Negative: [1] New-onset diabetes suspected (e.g., frequent urination, weak, weight loss) AND [2] vomiting or rapid breathing   Negative: Blood glucose > 500 mg/dL (27.8 mmol/L)   Negative: [1] Blood glucose > 240 mg/dL (13.3 mmol/L) AND [2] urine ketones moderate-large (or more than 1+)   Negative: [1] Blood glucose > 240 mg/dL (13.3 mmol/L) AND [2] blood ketones > 1.4 mmol/L   Negative: [1] Blood glucose > 240 mg/dL (13.3 mmol/L) AND [2] vomiting AND [3] unable to check for ketones (in blood or urine)   Negative: Vomiting lasts > 4 hours   Negative: Patient sounds very sick or weak to the triager   Negative: Fever > 100.4 F (38.0 C)    Protocols used: Diabetes - High Blood Sugar-A-

## 2025-06-07 NOTE — TELEPHONE ENCOUNTER
Patient states she has been having elevated BS all day. I have reached out to oncall via secure chat. Patient is advised if no directives in 30 minutes she would have to go to the ED. Patient voiced understanding.

## 2025-06-07 NOTE — FIRST PROVIDER EVALUATION
Medical screening examination initiated.  I have conducted a focused provider triage encounter, findings are as follows:    Brief history of present illness:  54 yo female with PMHx DM presenting to the ED with complaints of high blood sugar. Patient reports having an iron infusion last week and yesterday; needs to have steroids when she is having infusions. Steroids have caused her sugar to go up. Symptoms include tremors, nausea, and weakness. Symptoms have been presenting x1 day.     Vitals:    06/07/25 0132   BP: 126/71   Pulse: 107   Resp: 19   Temp: 98.1 °F (36.7 °C)   TempSrc: Oral   SpO2: 97%       Pertinent physical exam:  VSS NAD. AAO. Respirations even and unlabored.     Brief workup plan:  Preliminary workup initiated; this workup will be continued and followed by the physician or advanced practice provider that is assigned to the patient when roomed.

## 2025-06-07 NOTE — ED PROVIDER NOTES
"SCRIBE #1 NOTE: I, Aditya Quezada, am scribing for, and in the presence of, Roopa Dupree MD. I have scribed the entire note.       History     Chief Complaint   Patient presents with    Hyperglycemia     Pt presents with c/o high blood sugar readings at home. Pt states she has been on steroid medications while she is receiving iron infusions. Pt states her last infusion was yesterday. CBG in triage >500mg/dL     Review of patient's allergies indicates:   Allergen Reactions    Codeine Itching    Hydromorphone Other (See Comments)     Can't wake up for long time if taken    Slow to wake up after surgery after receiving    Aleve [naproxen sodium]      Increases BP    Ibuprofen      Increases BP    Neuromuscular blockers, steroidal Hives     some    Pregabalin Itching    Versed [midazolam]     Latex, natural rubber Rash    Morphine Rash     itching    Norco [hydrocodone-acetaminophen] Itching, Rash and Hallucinations    Secobarbital sodium Rash     itching    Tylox [oxycodone-acetaminophen] Rash         History of Present Illness     HPI    6/7/2025, 8:05 AM  History obtained from the patient and medical records      History of Present Illness: Yolanda Betts is a 55 y.o. female patient with a PMHx of T2DM, seizures, HTN, HLD, GERD, asthma, and anemia who presents to the Emergency Department for evaluation of hyperglycemia (CBG over 400 while at home) which began a week ago. Pt reports she has been getting iron infusions and steroid injections for the last week. Pt's last infusion was yesterday. Pt reports she is scheduled for 2 more infusions before she is done. Pt states that she "feels ill" as well. Symptoms are constant and moderate in severity. No mitigating or exacerbating factors reported. Pt reports she is compliant with her T2DM medications. No prior Tx specified.  No further complaints or concerns at this time.       Arrival mode: Personal Transportation    PCP: Rose Khan DNP      "   Past Medical History:  Past Medical History:   Diagnosis Date    Abnormal Pap smear of cervix     HPV genital warts    Anemia     Anxiety     Arthritis     Asthma 10/11/2016    Bipolar 1 disorder     Diabetes mellitus, type 2     Dyslipidemia associated with type 2 diabetes mellitus 2019    Dyspnea due to COVID-19 2024    General anesthetics causing adverse effect in therapeutic use     Genital warts     GERD (gastroesophageal reflux disease)     Herpes simplex virus (HSV) infection     Hyperlipidemia     Hypertension     Hypertension complicating diabetes 2019    Migraine with aura and without status migrainosus, not intractable 2016    Mild persistent asthma without complication 10/11/2016    Morbid obesity with body mass index (BMI) of 45.0 to 49.9 in adult 2017    Myoclonus     Obstructive sleep apnea     ALEN (obstructive sleep apnea)     2L per N/C q HS    Schizoaffective disorder, bipolar type 2019    Seasonal allergic rhinitis due to pollen 2019    Seizures     Type 2 diabetes mellitus with hyperglycemia, with long-term current use of insulin 2017    Type 2 diabetes mellitus with hyperglycemia, without long-term current use of insulin 2017       Past Surgical History:  Past Surgical History:   Procedure Laterality Date    abcess removed right back      ANGIOGRAM, CORONARY, WITH LEFT HEART CATHETERIZATION N/A 2024    Procedure: Angiogram, Coronary, with Left Heart Cath;  Surgeon: Jaimie Wills MD;  Location: [facility] CATH LAB;  Service: Cardiology;  Laterality: N/A;    BELT ABDOMINOPLASTY      BREAST SURGERY Bilateral     Reduction    CARPAL TUNNEL RELEASE Bilateral 2023    Procedure: RELEASE, CARPAL TUNNEL;  Surgeon: Neville Roth MD;  Location: [facility] OR;  Service: Orthopedics;  Laterality: Bilateral;  bilateral carpal tunnel release     SECTION      X 2    COLONOSCOPY      COLONOSCOPY N/A 2018    Procedure:  colonoscopy for iron deficiency anemia;  Surgeon: Frankie Sanchez MD;  Location: [facility] ENDO;  Service: Endoscopy;  Laterality: N/A;    COLONOSCOPY N/A 2/28/2018    Procedure: COLONOSCOPY;  Surgeon: Frankie Sanchez MD;  Location: [facility] ENDO;  Service: Endoscopy;  Laterality: N/A;    COLONOSCOPY N/A 3/10/2023    Procedure: COLONOSCOPY;  Surgeon: Lalita Montes De Oca MD;  Location: [facility] ENDO;  Service: Endoscopy;  Laterality: N/A;    COLONOSCOPY N/A 4/4/2024    Procedure: COLONOSCOPY;  Surgeon: Tara Og MD;  Location: [facility] ENDO;  Service: Endoscopy;  Laterality: N/A;    EPIDURAL STEROID INJECTION INTO CERVICAL SPINE N/A 1/31/2023    Procedure: C6/7 IL ROCAEL RN IV Sedation;  Surgeon: Otto Phillips MD;  Location: [facility] PAIN MGT;  Service: Pain Management;  Laterality: N/A;    EPIDURAL STEROID INJECTION INTO CERVICAL SPINE N/A 1/30/2024    Procedure: C5/6 IL ROCALE;  Surgeon: Otto Phillips MD;  Location: [facility] PAIN MGT;  Service: Pain Management;  Laterality: N/A;    ESOPHAGOGASTRODUODENOSCOPY N/A 3/10/2023    Procedure: EGD (ESOPHAGOGASTRODUODENOSCOPY);  Surgeon: Lalita Montes De Oca MD;  Location: [facility];  Service: Endoscopy;  Laterality: N/A;    HYSTERECTOMY      w/ BSO; hypermenorrhea    INJECTION OF ANESTHETIC AGENT AROUND MEDIAL BRANCH NERVES INNERVATING CERVICAL FACET JOINT Bilateral 12/19/2023    Procedure: Bilateral C5-7 MBB (diagnostic);  Surgeon: Otto Phillips MD;  Location: [facility] PAIN MGT;  Service: Pain Management;  Laterality: Bilateral;    MOUTH SURGERY  1996    OOPHORECTOMY      hyst/bso, hypermenorrhea    ROBOT-ASSISTED CHOLECYSTECTOMY USING DA DARI XI N/A 1/3/2020    Procedure: XI ROBOTIC CHOLECYSTECTOMY;  Surgeon: Damir Driscoll MD;  Location: [facility] OR;  Service: General;  Laterality: N/A;    TOTAL REDUCTION MAMMOPLASTY      TUBAL LIGATION           Family History:  Family History   Problem Relation Name Age of Onset    Diabetes Mother      Hyperlipidemia Mother      Hypertension  "Mother      Asthma Mother      COPD Mother      Glaucoma Mother      Thyroid disease Mother      Anesthesia problems Mother          "almost had a cardiac arrest" , blood clots    Hypertension Father      Hyperlipidemia Father      Cancer Father          Brain, lung, liver, kidney    No Known Problems Sister      Hypertension Brother      Alcohol abuse Brother      Heart disease Maternal Grandmother      Hyperlipidemia Maternal Grandmother      Hypertension Maternal Grandmother      Cataracts Maternal Grandmother      Diabetes Maternal Grandmother      Heart disease Maternal Grandfather      Hyperlipidemia Maternal Grandfather      Hypertension Maternal Grandfather      Glaucoma Maternal Grandfather      Cancer Maternal Grandfather      Cataracts Maternal Grandfather      Macular degeneration Maternal Grandfather      Diabetes Maternal Grandfather      Heart disease Paternal Grandmother      Hyperlipidemia Paternal Grandmother      Hypertension Paternal Grandmother      Cataracts Paternal Grandmother      Heart disease Paternal Grandfather      Hyperlipidemia Paternal Grandfather      Hypertension Paternal Grandfather      Cataracts Paternal Grandfather      Breast cancer Maternal Cousin      Breast cancer Maternal Cousin      Breast cancer Maternal Cousin      Breast cancer Maternal Cousin         Social History:  Social History     Tobacco Use    Smoking status: Never     Passive exposure: Never    Smokeless tobacco: Never   Substance and Sexual Activity    Alcohol use: Not Currently     Comment: socially  No alcohol 72h prior to sx    Drug use: No    Sexual activity: Yes     Partners: Male     Birth control/protection: Surgical     Comment: [facility]        Review of Systems     Review of Systems   Constitutional:  Negative for fever.   HENT:  Negative for sore throat.    Respiratory:  Negative for shortness of breath.    Cardiovascular:  Negative for chest pain.   Gastrointestinal:  Negative for nausea.   Endocrine: "        (+) hyperglycemia   Genitourinary:  Negative for dysuria.   Musculoskeletal:  Negative for back pain.   Skin:  Negative for rash.   Neurological:  Negative for weakness.   Hematological:  Does not bruise/bleed easily.   All other systems reviewed and are negative.       Physical Exam     Initial Vitals [06/07/25 0132]   BP Pulse Resp Temp SpO2   126/71 107 19 98.1 °F (36.7 °C) 97 %      MAP       --          Physical Exam  Nursing Notes and Vital Signs Reviewed.  Constitutional: Patient is in no apparent distress. Well-developed and well-nourished.  Head: Atraumatic. Normocephalic.  Eyes: PERRL. EOM intact. Conjunctivae are not pale. No scleral icterus.  ENT: Mucous membranes are moist. Oropharynx is clear and symmetric.    Neck: Supple. Full ROM. No lymphadenopathy.  Cardiovascular: Regular rate. Regular rhythm. No murmurs, rubs, or gallops. Distal pulses are 2+ and symmetric.  Pulmonary/Chest: No respiratory distress. Clear to auscultation bilaterally. No wheezing or rales.  Abdominal: Soft and non-distended. There is no tenderness.  No rebound, guarding, or rigidity. Good bowel sounds.  Genitourinary: No CVA tenderness.  Musculoskeletal: Moves all extremities. No obvious deformities. No edema. No calf tenderness.  Skin: Warm and dry.  Neurological:  Alert, awake, and appropriate.  Normal speech.  No acute focal neurological deficits are appreciated.  Psychiatric: Normal affect. Good eye contact. Appropriate in content.     ED Course   Procedures  ED Vital Signs:  Vitals:    06/07/25 0132 06/07/25 0700 06/07/25 0730 06/07/25 0800   BP: 126/71 124/76 122/65 119/64   Pulse: 107 98 99 95   Resp: 19 16 18 18   Temp: 98.1 °F (36.7 °C)      TempSrc: Oral      SpO2: 97% 95% 95% 95%    06/07/25 0830   BP: 139/79   Pulse: 96   Resp: 18   Temp:    TempSrc:    SpO2: 96%       Abnormal Lab Results:  Labs Reviewed   COMPREHENSIVE METABOLIC PANEL - Abnormal       Result Value    Sodium 131 (*)     Potassium 5.1       Chloride 96      CO2 21 (*)     Glucose 379 (*)     BUN 14      Creatinine 0.9      Calcium 9.7      Protein Total 7.7      Albumin 3.5      Bilirubin Total 0.2            AST 15      ALT 25      Anion Gap 14      eGFR >60     URINALYSIS, REFLEX TO URINE CULTURE - Abnormal    Color, UA Yellow      Appearance, UA Clear      pH, UA 6.0      Spec Grav UA >=1.030 (*)     Protein, UA Negative      Glucose, UA 4+ (*)     Ketones, UA Negative      Bilirubin, UA Negative      Blood, UA Negative      Nitrites, UA Negative      Urobilinogen, UA Negative      Leukocyte Esterase, UA Negative     CBC WITH DIFFERENTIAL - Abnormal    WBC 10.48      RBC 4.15      HGB 9.5 (*)     HCT 32.1 (*)     MCV 77 (*)     MCH 22.9 (*)     MCHC 29.6 (*)     RDW 18.0 (*)     Platelet Count 353      MPV 9.4      Nucleated RBC 0      Neut % 82.8 (*)     Lymph % 14.1 (*)     Mono % 1.5 (*)     Eos % 0.0      Basophil % 0.2      Imm Grans % 1.4 (*)     Neut # 8.67 (*)     Lymph # 1.48      Mono # 0.16 (*)     Eos # 0.00      Baso # 0.02      Imm Grans # 0.15 (*)    POCT GLUCOSE - Abnormal    POCT Glucose 290 (*)    POCT GLUCOSE - Abnormal    POCT Glucose 298 (*)    POCT GLUCOSE - Abnormal    POCT Glucose 253 (*)    POCT GLUCOSE - Abnormal    POCT Glucose 214 (*)    POCT GLUCOSE - Abnormal    POCT Glucose >500 (*)    BETA - HYDROXYBUTYRATE, SERUM - Normal    Beta-Hydroxybutyrate 0.1     CBC W/ AUTO DIFFERENTIAL    Narrative:     The following orders were created for panel order CBC auto differential.  Procedure                               Abnormality         Status                     ---------                               -----------         ------                     CBC with Differential[[id number]]       Abnormal            Final result                 Please view results for these tests on the individual orders.   GREY TOP URINE HOLD    Extra Tube Hold for add-ons.     URINALYSIS MICROSCOPIC    RBC, UA 1      WBC, UA 0      WBC Clumps,  UA None      Bacteria, UA None      Yeast, UA None      Hyaline Casts, UA 1      Microscopic Comment            All Lab Results:  Results for orders placed or performed during the hospital encounter of 06/07/25   POCT glucose    Collection Time: 06/07/25  1:31 AM   Result Value Ref Range    POCT Glucose >500 (HH) 70 - 110 mg/dL   EKG 12-lead    Collection Time: 06/07/25  2:09 AM   Result Value Ref Range    QRS Duration 76 ms    [facility] QTC Calculation 405 ms   Urinalysis, Reflex to Urine Culture Urine, Clean Catch    Collection Time: 06/07/25  5:20 AM    Specimen: Urine   Result Value Ref Range    Color, UA Yellow Straw, Milagros, Yellow, Light-Orange    Appearance, UA Clear Clear    pH, UA 6.0 5.0 - 8.0    Spec Grav UA >=1.030 (A) 1.005 - 1.030    Protein, UA Negative Negative    Glucose, UA 4+ (A) Negative    Ketones, UA Negative Negative    Bilirubin, UA Negative Negative    Blood, UA Negative Negative    Nitrites, UA Negative Negative    Urobilinogen, UA Negative <2.0 EU/dL    Leukocyte Esterase, UA Negative Negative   GREY TOP URINE HOLD    Collection Time: 06/07/25  5:20 AM   Result Value Ref Range    Extra Tube Hold for add-ons.    Urinalysis Microscopic    Collection Time: 06/07/25  5:20 AM   Result Value Ref Range    RBC, UA 1 0 - 4 /HPF    WBC, UA 0 0 - 5 /HPF    WBC Clumps, UA None None, Rare    Bacteria, UA None None, Rare, Occasional /HPF    Yeast, UA None None /HPF    Hyaline Casts, UA 1 0 - 1 /LPF    Microscopic Comment     POCT glucose    Collection Time: 06/07/25  5:39 AM   Result Value Ref Range    POCT Glucose 290 (H) 70 - 110 mg/dL   POCT glucose    Collection Time: 06/07/25  7:02 AM   Result Value Ref Range    POCT Glucose 298 (H) 70 - 110 mg/dL   POCT glucose    Collection Time: 06/07/25  7:44 AM   Result Value Ref Range    POCT Glucose 253 (H) 70 - 110 mg/dL   Comprehensive metabolic panel    Collection Time: 06/07/25  8:10 AM   Result Value Ref Range    Sodium 131 (L) 136 - 145 mmol/L    Potassium  5.1 3.5 - 5.1 mmol/L    Chloride 96 95 - 110 mmol/L    CO2 21 (L) 23 - 29 mmol/L    Glucose 379 (H) 70 - 110 mg/dL    BUN 14 6 - 20 mg/dL    Creatinine 0.9 0.5 - 1.4 mg/dL    Calcium 9.7 8.7 - 10.5 mg/dL    Protein Total 7.7 6.0 - 8.4 gm/dL    Albumin 3.5 3.5 - 5.2 g/dL    Bilirubin Total 0.2 0.1 - 1.0 mg/dL     40 - 150 unit/L    AST 15 11 - 45 unit/L    ALT 25 10 - 44 unit/L    Anion Gap 14 8 - 16 mmol/L    eGFR >60 >60 mL/min/1.73/m2   Beta - Hydroxybutyrate, Serum    Collection Time: 06/07/25  8:10 AM   Result Value Ref Range    Beta-Hydroxybutyrate 0.1 <=0.5 mmol/L   CBC with Differential    Collection Time: 06/07/25  8:10 AM   Result Value Ref Range    WBC 10.48 3.90 - 12.70 K/uL    RBC 4.15 4.00 - 5.40 M/uL    HGB 9.5 (L) 12.0 - 16.0 gm/dL    HCT 32.1 (L) 37.0 - 48.5 %    MCV 77 (L) 82 - 98 fL    MCH 22.9 (L) 27.0 - 31.0 pg    MCHC 29.6 (L) 32.0 - 36.0 g/dL    RDW 18.0 (H) 11.5 - 14.5 %    Platelet Count 353 150 - 450 K/uL    MPV 9.4 9.2 - 12.9 fL    Nucleated RBC 0 <=0 /100 WBC    Neut % 82.8 (H) 38 - 73 %    Lymph % 14.1 (L) 18 - 48 %    Mono % 1.5 (L) 4 - 15 %    Eos % 0.0 <=8 %    Basophil % 0.2 <=1.9 %    Imm Grans % 1.4 (H) 0.0 - 0.5 %    Neut # 8.67 (H) 1.8 - 7.7 K/uL    Lymph # 1.48 1 - 4.8 K/uL    Mono # 0.16 (L) 0.3 - 1 K/uL    Eos # 0.00 <=0.5 K/uL    Baso # 0.02 <=0.2 K/uL    Imm Grans # 0.15 (H) 0.00 - 0.04 K/uL   POCT glucose    Collection Time: 06/07/25  8:41 AM   Result Value Ref Range    POCT Glucose 214 (H) 70 - 110 mg/dL     *Note: Due to a large number of results and/or encounters for the requested time period, some results have not been displayed. A complete set of results can be found in Results Review.       Imaging Results:  Imaging Results              X-Ray Chest AP Portable (Final result)  Result time 06/07/25 09:18:42      Final result by Ayush Singer MD (06/07/25 09:18:42)                   Impression:      Underinflated lungs with bronchovascular crowding.   Otherwise, no acute cardiopulmonary abnormality.    Finalized on: 6/7/2025 9:18 AM By:  Ayush Singer MD  [facility]# [id number]      2025-06-07 09:20:48.691     [facility]               Narrative:    EXAM: XR CHEST AP PORTABLE    CLINICAL HISTORY: hyperglycemia;    TECHNIQUE: Frontal view of the chest.    COMPARISON: X-ray dated 04/12/2025    FINDINGS:   Cardiomediastinal silhouette is unchanged in size and contour. Trachea is midline. Pulmonary vasculature is normal. Lungs are underinflated with mild bronchovascular crowding. No significant pleural effusion or pneumothorax. No acute bony abnormality.                                         The EKG was ordered, reviewed, and independently interpreted by the ED provider.  Interpretation time: 2:09  Rate: 104 BPM  Rhythm: sinus tachycardia  Interpretation: Septal infarct (cited on or before 12-Apr-2025). Possible Inferior infarct ,age undetermined. ST and T wave abnormality, consider anterolateral ischemia . No STEMI.         The Emergency Provider reviewed the vital signs and test results, which are outlined above.     ED Discussion     8:59 AM: Reassessed pt at this time. Discussed with patient and/or family/caretaker all pertinent ED information and results. Discussed pt dx and plan of tx. Gave the patient all f/u and return to the ED instructions. All questions and concerns were addressed at this time. Patient and/or family/caretaker expresses understanding of information and instructions, and is comfortable with plan to discharge. Pt is stable for discharge.     I discussed with patient and/or family/caretaker that evaluation in the ED does not suggest any emergent or life threatening medical conditions requiring immediate intervention beyond what was provided in the ED, and I believe patient is safe for discharge. Regardless, an unremarkable evaluation in the ED does not preclude the development or presence of a serious or life threatening condition. As such, I  instructed that the patient is to return immediately for any worsening or change in current symptoms.            Medical Decision Making  Amount and/or Complexity of Data Reviewed  External Data Reviewed: labs, radiology, ECG and notes.  Labs: ordered. Decision-making details documented in ED Course.  Radiology: ordered and independent interpretation performed. Decision-making details documented in ED Course.  ECG/medicine tests: ordered. Decision-making details documented in ED Course.    Risk  OTC drugs.  Prescription drug management.                ED Medication(s):  Medications   insulin regular injection 10 Units 0.1 mL (10 Units Intravenous Given 6/7/25 0711)   insulin regular injection 10 Units 0.1 mL (10 Units Intravenous Given 6/7/25 0800)       Discharge Medication List as of 6/7/2025  8:45 AM           Follow-up Information       Schedule an appointment as soon as possible for a visit  with Rose Khan DNP.    Specialty: Family Medicine  Why: As needed  Contact information:  [address] [id number] [phone]                                 Scribe Attestation:   Scribe #1: I performed the above scribed service and the documentation accurately describes the services I performed. I attest to the accuracy of the note.     Attending:   Physician Attestation Statement for Scribe #1: I, Roopa Dupree MD, personally performed the services described in this documentation, as scribed by Aditya Quezada, in my presence, and it is both accurate and complete.           Clinical Impression       ICD-10-CM ICD-9-CM   1. Hyperglycemia  R73.9 790.29       Disposition:   Disposition: Discharged  Condition: Stable        Roopa Dupree MD  06/12/25 1012

## 2025-06-08 LAB — POCT GLUCOSE: >500 MG/DL (ref 70–110)

## 2025-06-11 ENCOUNTER — TELEPHONE (OUTPATIENT)
Dept: PAIN MEDICINE | Facility: CLINIC | Age: 55
End: 2025-06-11
Payer: MEDICARE

## 2025-06-12 ENCOUNTER — OFFICE VISIT (OUTPATIENT)
Dept: DIABETES | Facility: CLINIC | Age: 55
End: 2025-06-12
Payer: MEDICARE

## 2025-06-12 ENCOUNTER — TELEPHONE (OUTPATIENT)
Dept: RHEUMATOLOGY | Facility: CLINIC | Age: 55
End: 2025-06-12
Payer: MEDICARE

## 2025-06-12 VITALS
BODY MASS INDEX: 44.58 KG/M2 | WEIGHT: 198.88 LBS | SYSTOLIC BLOOD PRESSURE: 110 MMHG | DIASTOLIC BLOOD PRESSURE: 68 MMHG

## 2025-06-12 DIAGNOSIS — E11.9 HYPERTENSION COMPLICATING DIABETES: Chronic | ICD-10-CM

## 2025-06-12 DIAGNOSIS — I10 HYPERTENSION COMPLICATING DIABETES: Chronic | ICD-10-CM

## 2025-06-12 DIAGNOSIS — K58.2 IRRITABLE BOWEL SYNDROME WITH BOTH CONSTIPATION AND DIARRHEA: ICD-10-CM

## 2025-06-12 DIAGNOSIS — M79.7 FIBROMYALGIA: Primary | ICD-10-CM

## 2025-06-12 DIAGNOSIS — F51.3 SLEEP WALKING AND EATING: ICD-10-CM

## 2025-06-12 DIAGNOSIS — D50.0 IRON DEFICIENCY ANEMIA DUE TO CHRONIC BLOOD LOSS: ICD-10-CM

## 2025-06-12 DIAGNOSIS — M45.9 ANKYLOSING SPONDYLITIS, UNSPECIFIED SITE OF SPINE: ICD-10-CM

## 2025-06-12 DIAGNOSIS — E11.42 DIABETIC POLYNEUROPATHY ASSOCIATED WITH TYPE 2 DIABETES MELLITUS: Chronic | ICD-10-CM

## 2025-06-12 DIAGNOSIS — Z79.4 TYPE 2 DIABETES MELLITUS WITH HYPERGLYCEMIA, WITH LONG-TERM CURRENT USE OF INSULIN: Primary | ICD-10-CM

## 2025-06-12 DIAGNOSIS — F31.9 BIPOLAR 1 DISORDER: Chronic | ICD-10-CM

## 2025-06-12 DIAGNOSIS — E11.65 TYPE 2 DIABETES MELLITUS WITH HYPERGLYCEMIA, WITH LONG-TERM CURRENT USE OF INSULIN: Primary | ICD-10-CM

## 2025-06-12 DIAGNOSIS — K59.09 CHRONIC CONSTIPATION: ICD-10-CM

## 2025-06-12 LAB — GLUCOSE SERPL-MCNC: 212 MG/DL (ref 70–110)

## 2025-06-12 PROCEDURE — 99999 PR PBB SHADOW E&M-EST. PATIENT-LVL V: CPT | Mod: PBBFAC,,, | Performed by: NURSE PRACTITIONER

## 2025-06-12 RX ORDER — POLYETHYLENE GLYCOL-3350 AND ELECTROLYTES WITH FLAVOR PACK 240; 5.84; 2.98; 6.72; 22.72 G/278.26G; G/278.26G; G/278.26G; G/278.26G; G/278.26G
4000 POWDER, FOR SOLUTION ORAL ONCE
Qty: 4000 ML | Refills: 0 | Status: CANCELLED | OUTPATIENT
Start: 2025-06-12 | End: 2025-06-12

## 2025-06-12 RX ORDER — POLYETHYLENE GLYCOL 3350 17 G/17G
17 POWDER, FOR SOLUTION ORAL 2 TIMES DAILY
Qty: 1020 G | Refills: 0 | Status: SHIPPED | OUTPATIENT
Start: 2025-06-12 | End: 2026-06-12

## 2025-06-12 RX ORDER — TALC
6 POWDER (GRAM) TOPICAL NIGHTLY
Qty: 180 TABLET | Refills: 0 | Status: SHIPPED | OUTPATIENT
Start: 2025-06-12 | End: 2025-09-10

## 2025-06-12 NOTE — TELEPHONE ENCOUNTER
Copied from CRM #[id number]. Topic: General Inquiry - Patient Advice  >> Jun 12, 2025  9:48 AM Jared wrote:  Type: Patient Call Back    Who called: Patient     What is the request in detail: Pt is requesting a call back in reference to her being in pain.       Would the patient rather a call back or a response via [facility]?  call    Best call back number: [phone]     Additional Information:

## 2025-06-12 NOTE — PROGRESS NOTES
Patient ID: Yolanda Betts is a 55 y.o. female.  Patient's current PCP is Rose Khan DNP.   Collaborating Physician: ANDRIY Manrique MD    Chief Complaint: General Diabetes Follow-up (Omnipod/dexcom)    HPI  Yolanda Betts is a 55 y.o. Black or  female presenting for a new consult with me, previously seen by DO Ceron NP with LOV 3/12/25 for diabetes.       Patient has been diagnosed with type 2 diabetes since early 2000s .  Bariatric sleeve sx 6/1/21  Complications related to diabetes: peripheral neuropathy  Recent diabetes related hospitalizations: 6/7/25 - hyperglycemia after steroid premed for iron infusion    Previous diabetes education: [facility]  Occupation: Disabled LPN/Insurance sales,   LMP: ---    Current diet: Carb counting  Current weight: 198 on 6/12/25  Weight at LOV: 198 on 6/12/25  Weight at FOV: 198 on 6/12/25    Activity level: none set    Changes made at the last visit: per Elizabeth  - Pump Changes: change correct above to 110 , change SF to 32     For pump failure:  Lantus 16 units once daily     Novolog:  3 units -small meal  6 units- medium meal  9 units- large meal    Correction scale (for steroid use) :  Novolog every 3 hours using correction scale outside of mealtime.  -200: 2 units  -250: 4 units  -300: 6 units  -350: 8 units  BG greater than 350: 10 units      Per Dr Hidalgo, ENDO 5/20/25  - Continue Ozempic 2 mg weekly  - Continue Novolog via Omnipod 5, new settings:  Basal rate  12A: 1.6 U/h     ICR  12A: 8     ISF  12A: 25     Target: 110-110  IAT: 5h    Current issues: steroid premed every 2 weeks for iron infusion causing hyperglycemia. Complex health hx noted, incl sz hx. On psych meds that could effect glucose    Personal history of pancreatitis: denies  Personal history of abdominal surgery: Bariatric sx 6/1/21  Personal history of thyroid surgery: denies  Family history of pancreatic cancer in first-degree  relative: denies  Family history of MTC/MEN/endocrine tumors: denies       Past Medical History:   Diagnosis Date    Abnormal Pap smear of cervix     HPV genital warts    Anemia     Anxiety     Arthritis     Asthma 10/11/2016    Bipolar 1 disorder     Diabetes mellitus, type 2     Dyslipidemia associated with type 2 diabetes mellitus 05/13/2019    Dyspnea due to COVID-19 09/12/2024    General anesthetics causing adverse effect in therapeutic use     Genital warts     GERD (gastroesophageal reflux disease)     Herpes simplex virus (HSV) infection     Hyperlipidemia     Hypertension     Hypertension complicating diabetes 05/05/2019    Migraine with aura and without status migrainosus, not intractable 03/21/2016    Mild persistent asthma without complication 10/11/2016    Morbid obesity with body mass index (BMI) of 45.0 to 49.9 in adult 08/03/2017    Myoclonus     Obstructive sleep apnea     ALEN (obstructive sleep apnea)     2L per N/C q HS    Schizoaffective disorder, bipolar type 04/25/2019    Seasonal allergic rhinitis due to pollen 05/13/2019    Seizures     Type 2 diabetes mellitus with hyperglycemia, with long-term current use of insulin 12/21/2017    Type 2 diabetes mellitus with hyperglycemia, without long-term current use of insulin 12/21/2017     Social History[1]    Review of patient's allergies indicates:   Allergen Reactions    Codeine Itching    Hydromorphone Other (See Comments)     Can't wake up for long time if taken    Slow to wake up after surgery after receiving    Aleve [naproxen sodium]      Increases BP    Ibuprofen      Increases BP    Neuromuscular blockers, steroidal Hives     some    Pregabalin Itching    Versed [midazolam]     Latex, natural rubber Rash    Morphine Rash     itching    Norco [hydrocodone-acetaminophen] Itching, Rash and Hallucinations    Secobarbital sodium Rash     itching    Tylox [oxycodone-acetaminophen] Rash       CURRENT DM MEDICATIONS:   Diabetes Medications               glucagon (BAQSIMI) 3 mg/actuation [place] SPRAY 1 SPRAY IN NOSTRIL AS NEEDED FOR EMERGENCY. MAY REPEAT 1 TIME AFTER 15 MINUTES    insulin aspart U-100 (NOVOLOG FLEXPEN U-100 INSULIN) 100 unit/mL (3 mL) InPn pen Inject 9 Units into the skin 3 (three) times daily before meals. Use sliding scale for small medium and large meals    insulin aspart U-100 (NOVOLOG U-100 INSULIN ASPART) 100 unit/mL injection To use via insulin pump. Up to 200 units every 2 days    insulin glargine U-100, Lantus, (LANTUS SOLOSTAR U-100 INSULIN) 100 unit/mL (3 mL) InPn pen Inject 16 Units into the skin once daily. Use in the event of pump failure    OZEMPIC 2 mg/dose (8 mg/3 mL) PnIj Inject 2 mg into the skin every 7 days.     OP5 with Dexcom G6  - Automode    Past failed treatment(s) include:   Jardiance- multiple yeast infections;   Victoza - GI upset.  GLP-1- not advised due to GI issues (per GI specialist and DM management),   Metformin- low BGs     Meter/cgm: Dexcom G6  Blood glucose testing is performed regularly.   Patient is testing continuously times per day.  Any episodes of hypoglycemia? Rare  Glucose trends:   Per CGM download, for the last 10 days:  Average glucose of 221 mg/dL. Patient is 3+% in range. 30% of readings are mildly elevated with 34% of readings > 250. 0% hypoglycemia. SD 89 mg/dL. Estimated GMI --%. Glycemic control is unstable - spiking after meals and after receiving IV steroids      CGM data:      Her blood sugar in the clinic today was:   Lab Results   Component Value Date    POCGLU 212 (A) 06/12/2025       Statin: Taking  ACE/ARB: Not taking    Labs reviewed and are noted below.    Screening or Prevention Patient's value Goal Complete/Controlled?   HgA1C Testing and Control   Lab Results   Component Value Date    HGBA1C 9.0 (H) 03/18/2025      Annually/Less than 8% No   Lipid profile : 11/30/2024 Annually Yes   LDL control Lab Results   Component Value Date    LDLCALC 91.6 11/30/2024    Annually/Less than 100  mg/dl  Yes   Nephropathy screening Lab Results   Component Value Date    MICALBCREAT 11.5 12/17/2024     Lab Results   Component Value Date    PROTEINUA Negative 06/07/2025    Annually Yes   Blood pressure BP Readings from Last 1 Encounters:   06/12/25 110/68    Less than 140/90 Yes   Dilated retinal exam : 06/03/2025 Annually Yes    Foot exam   : 01/03/2025 Annually Yes     Glucose   Date Value Ref Range Status   06/07/2025 379 (H) 70 - 110 mg/dL Final   03/16/2025 442 (H) 70 - 110 mg/dL Final     Anion Gap   Date Value Ref Range Status   06/07/2025 14 8 - 16 mmol/L Final     eGFR if    Date Value Ref Range Status   03/08/2022 >60.0 >60 mL/min/1.73 m^2 Final     eGFR if non    Date Value Ref Range Status   03/08/2022 >60.0 >60 mL/min/1.73 m^2 Final     Comment:     Calculation used to obtain the estimated glomerular filtration  rate (eGFR) is the CKD-EPI equation.        Lab Results   Component Value Date    GLUTAMICACID 0.00 02/26/2018    CPEPTIDE 4.56 02/26/2018    T3FREE 3.0 05/20/2021    FREET4 0.77 03/15/2017    TSH 1.703 04/12/2025     Lab Results   Component Value Date    CPEPTIDE 4.56 02/26/2018     Lab Results   Component Value Date    GLUTAMICACID 0.00 02/26/2018       Wt Readings from Last 3 Encounters:   06/12/25 1401 90.2 kg (198 lb 13.7 oz)   06/04/25 1346 89.9 kg (198 lb 3.1 oz)   06/04/25 1115 89.4 kg (197 lb 1.5 oz)       Review of Systems   Constitutional:  Negative for malaise/fatigue and weight loss.   Eyes:  Negative for blurred vision and double vision.   Respiratory:  Negative for shortness of breath.    Cardiovascular: Negative.    Gastrointestinal: Negative.    Genitourinary:  Negative for frequency.   Musculoskeletal:  Negative for myalgias.   Neurological: Negative.    Psychiatric/Behavioral: Negative.         Physical Exam  Vitals reviewed.   Constitutional:       General: She is not in acute distress.     Appearance: Normal appearance. She is obese.    Eyes:      Conjunctiva/sclera: Conjunctivae normal.      Pupils: Pupils are equal, round, and reactive to light.   Cardiovascular:      Rate and Rhythm: Normal rate and regular rhythm.      Pulses: Normal pulses.      Heart sounds: Normal heart sounds.   Pulmonary:      Effort: Pulmonary effort is normal.      Breath sounds: Normal breath sounds.   Abdominal:      General: Bowel sounds are normal.      Palpations: Abdomen is soft.   Skin:     General: Skin is warm and dry.      Comments: Sites without hypertrophy and/or signs of infection   Neurological:      General: No focal deficit present.      Mental Status: She is alert and oriented to person, place, and time.   Psychiatric:         Mood and Affect: Mood normal.         Behavior: Behavior normal.           Assessment & Plan    Yolanda was seen today for general diabetes follow-up.    Diagnoses and all orders for this visit:    Type 2 diabetes mellitus with hyperglycemia, with long-term current use of insulin- not at goal  -     POCT Glucose, Hand-Held Device  -     Change carb ratio to:         0000  6  -     Bolus for the the first 48 hours every 2-3 hours per pump using correction feature after receiving steroid premed  -     Bolus for all carbs before eating   -     Pump failure plan:        Long Acting Insulin Dosing:   Lantus 38 units every day  Rapid Acting Insulin Dosing for MEALS:   1 unit for every 6 grams carbohydrate                           Rapid acting Correction Dosing every 4 hours as needed OUTSIDE OF EATING:                         -  Current blood sugar - target of 110 divided by correction factor of 25 = Dose to take     Iron deficiency anemia due to chronic blood loss- on steroid premed noted    Diabetic polyneuropathy associated with type 2 diabetes mellitus    Bipolar 1 disorder - on med that can affect glucose    Hypertension complicating diabetes    BMI 40.0-44.9, adult    - Reviewed with patient:  The basic pathophysiology of Type 2  diabetes  Mechanism of action and action time of medications  Use of home glucose monitor/cgm  Basic diet/carbohydrate counting/avoiding simple sugars/plate method  Proper hydration   Risk of complications and preventive measures  When to call for assistance  Call for bs < 80 or > 180 consistently          - Follow up: 3 weeks    Visit today included increased complexity associated with the care of the episodic problem GLUCOSE CONTROL addressed and managing the longitudinal care of the patient due to the serious and/or complex managed problem(s) DIABETES.    I spent a total of 48 minutes on the day of the visit.This includes face to face time and non-face to face time preparing to see the patient (eg, review of tests), documenting clinical information in the electronic record, independently interpreting results and communicating results to the patient.                      [1]   Social History  Socioeconomic History    Marital status: Single   Tobacco Use    Smoking status: Never     Passive exposure: Never    Smokeless tobacco: Never   Substance and Sexual Activity    Alcohol use: Not Currently     Comment: socially  No alcohol 72h prior to sx    Drug use: No    Sexual activity: Yes     Partners: Male     Birth control/protection: Surgical     Comment: hyst   Other Topics Concern    Patient feels they ought to cut down on drinking/drug use No    Patient annoyed by others criticizing their drinking/drug use No    Patient has felt bad or guilty about drinking/drug use No    Patient has had a drink/used drugs as an eye opener in the AM No   Social History Narrative    Long-term care nurse     Social Drivers of Health     Financial Resource Strain: High Risk (2/20/2025)    Overall Financial Resource Strain (CARDIA)     Difficulty of Paying Living Expenses: Very hard   Food Insecurity: Food Insecurity Present (2/20/2025)    Hunger Vital Sign     Worried About Running Out of Food in the Last Year: Often true     Ran Out  of Food in the Last Year: Often true   Transportation Needs: Unmet Transportation Needs (3/19/2025)    PRAPARE - Transportation     Lack of Transportation (Medical): Yes     Lack of Transportation (Non-Medical): Yes   Physical Activity: Insufficiently Active (2/20/2025)    Exercise Vital Sign     Days of Exercise per Week: 2 days     Minutes of Exercise per Session: 20 min   Stress: Stress Concern Present (2/20/2025)    [employer] - Occupational Stress Questionnaire     Feeling of Stress : Very much   Housing Stability: High Risk (2/20/2025)    Housing Stability Vital Sign     Unable to Pay for Housing in the Last Year: Yes     Number of Times Moved in the Last Year: 0     Homeless in the Last Year: No

## 2025-06-12 NOTE — TELEPHONE ENCOUNTER
Informed pt that medication was sent to [facility] , provided pharmacy number. Pt was grateful for the call)DD

## 2025-06-12 NOTE — TELEPHONE ENCOUNTER
Called and advised to consider medical marijuana since she is allergic to several pain medications and cannot take tramadol due to seizures. Aquatic therapy referral placed.

## 2025-06-12 NOTE — PATIENT INSTRUCTIONS
Pump Failure Backup Plan:     Below is a plan in the event that your insulin pump breaks or malfunctions. You should begin to consider putting your Pump Failure Back Up Plan into action if you will be without your pump for greater than 4 hours. Call the insulin pump company (toll free number is printed on the label on the back of the insulin pump) and our office if you have a pump failure immediately to discuss troubleshooting pump issues and/or replacing the pump.    You should have access to/carry with you the items below (especially when traveling):  - Rapid acting insulin vials (Humalog/Novolog/Fiasp/Lyumjev)   - Insulin syringes/Pen needles  - A back up long acting insulin (Lantus/Tresiba/Toujeo/Basaglar/Semglee)    A prescription for a long acting insulin has been sent to your pharmacy for use during an emergency pump failure. It is important to keep these supplies with you or in locations you frequent such as work, etc. If needed, please reach out to my office about any letters you may need for permission to keep these items for emergency purposes.     When the insulin pump is restarted, do not restart basal rates until at least 22 hours after the last long acting insulin injection.      Dosing:   If the insulin pump is non functional and will be discontinued for more than 10 hours, please give daily injections of:    Long Acting Insulin Dosing:     Lantus 38 units every day    Rapid Acting Insulin Dosing for MEALS:     1 unit for every 6 grams carbohydrate                                          Rapid acting Correction Dosing every 4 hours as needed OUTSIDE OF EATING:                           -  Current blood sugar - target of 110 divided by correction factor of 25 = Dose to take

## 2025-06-12 NOTE — TELEPHONE ENCOUNTER
"Spoke w/ patient and she stated,     "She is having 12/10 pain all over her body" She is wanting something for pain. She is taking the taltz.     Her pref'd pharmacy [facility]    Please advise   "

## 2025-06-12 NOTE — TELEPHONE ENCOUNTER
Copied from CRM #[id number]. Topic: General Inquiry - Patient Advice  >> Jun 12, 2025 11:11 AM Lori wrote:  .Type:  Needs Medical Advice    Who Called: .Yolandamario Navarrovivi Alpesh  Would the patient rather a call back or a response via [facility]? Call back  Best Call Back Number: .[phone]  Additional Information: pt states she is needing a call back regarding a pre-cert she is needing.

## 2025-06-13 ENCOUNTER — TELEPHONE (OUTPATIENT)
Dept: RHEUMATOLOGY | Facility: CLINIC | Age: 55
End: 2025-06-13
Payer: MEDICARE

## 2025-06-13 ENCOUNTER — TELEPHONE (OUTPATIENT)
Dept: DIABETES | Facility: CLINIC | Age: 55
End: 2025-06-13
Payer: MEDICARE

## 2025-06-13 ENCOUNTER — HOSPITAL ENCOUNTER (EMERGENCY)
Facility: HOSPITAL | Age: 55
Discharge: HOME | End: 2025-06-14
Attending: EMERGENCY MEDICINE
Payer: MEDICARE

## 2025-06-13 DIAGNOSIS — R07.9 CHEST PAIN: ICD-10-CM

## 2025-06-13 DIAGNOSIS — E86.0 DEHYDRATION: Primary | ICD-10-CM

## 2025-06-13 DIAGNOSIS — R73.9 HYPERGLYCEMIA: ICD-10-CM

## 2025-06-13 DIAGNOSIS — R11.0 NAUSEA: ICD-10-CM

## 2025-06-13 LAB
ABSOLUTE EOSINOPHIL (OHS): 0.12 K/UL
ABSOLUTE MONOCYTE (OHS): 0.56 K/UL (ref 0.3–1)
ABSOLUTE NEUTROPHIL COUNT (OHS): 5.61 K/UL (ref 1.8–7.7)
ALBUMIN SERPL BCP-MCNC: 3.6 G/DL (ref 3.5–5.2)
ALP SERPL-CCNC: 109 UNIT/L (ref 40–150)
ALT SERPL W/O P-5'-P-CCNC: 25 UNIT/L (ref 10–44)
ANION GAP (OHS): 12 MMOL/L (ref 8–16)
AST SERPL-CCNC: 18 UNIT/L (ref 11–45)
BASOPHILS # BLD AUTO: 0.04 K/UL
BASOPHILS NFR BLD AUTO: 0.4 %
BILIRUB SERPL-MCNC: 0.2 MG/DL (ref 0.1–1)
BNP SERPL-MCNC: <10 PG/ML (ref 0–99)
BUN SERPL-MCNC: 13 MG/DL (ref 6–20)
CALCIUM SERPL-MCNC: 9.8 MG/DL (ref 8.7–10.5)
CHLORIDE SERPL-SCNC: 102 MMOL/L (ref 95–110)
CO2 SERPL-SCNC: 21 MMOL/L (ref 23–29)
CREAT SERPL-MCNC: 0.7 MG/DL (ref 0.5–1.4)
ERYTHROCYTE [DISTWIDTH] IN BLOOD BY AUTOMATED COUNT: 19.3 % (ref 11.5–14.5)
GFR SERPLBLD CREATININE-BSD FMLA CKD-EPI: >60 ML/MIN/1.73/M2
GLUCOSE SERPL-MCNC: 136 MG/DL (ref 70–110)
HCT VFR BLD AUTO: 36 % (ref 37–48.5)
HCV AB SERPL QL IA: NEGATIVE
HGB BLD-MCNC: 10.6 GM/DL (ref 12–16)
HOLD SPECIMEN: NORMAL
IMM GRANULOCYTES # BLD AUTO: 0.07 K/UL (ref 0–0.04)
IMM GRANULOCYTES NFR BLD AUTO: 0.7 % (ref 0–0.5)
INFLUENZA A MOLECULAR (OHS): NEGATIVE
INFLUENZA B MOLECULAR (OHS): NEGATIVE
LIPASE SERPL-CCNC: 17 U/L (ref 4–60)
LYMPHOCYTES # BLD AUTO: 3.06 K/UL (ref 1–4.8)
MCH RBC QN AUTO: 23.3 PG (ref 27–31)
MCHC RBC AUTO-ENTMCNC: 29.4 G/DL (ref 32–36)
MCV RBC AUTO: 79 FL (ref 82–98)
NUCLEATED RBC (/100WBC) (OHS): 0 /100 WBC
PLATELET # BLD AUTO: 334 K/UL (ref 150–450)
PMV BLD AUTO: 9.1 FL (ref 9.2–12.9)
POCT GLUCOSE: 182 MG/DL (ref 70–110)
POTASSIUM SERPL-SCNC: 4 MMOL/L (ref 3.5–5.1)
PROT SERPL-MCNC: 7.6 GM/DL (ref 6–8.4)
RBC # BLD AUTO: 4.55 M/UL (ref 4–5.4)
RELATIVE EOSINOPHIL (OHS): 1.3 %
RELATIVE LYMPHOCYTE (OHS): 32.3 % (ref 18–48)
RELATIVE MONOCYTE (OHS): 5.9 % (ref 4–15)
RELATIVE NEUTROPHIL (OHS): 59.4 % (ref 38–73)
SARS-COV-2 RDRP RESP QL NAA+PROBE: NEGATIVE
SODIUM SERPL-SCNC: 135 MMOL/L (ref 136–145)
TROPONIN I SERPL DL<=0.01 NG/ML-MCNC: <0.006 NG/ML
WBC # BLD AUTO: 9.46 K/UL (ref 3.9–12.7)

## 2025-06-13 PROCEDURE — 87502 INFLUENZA DNA AMP PROBE: CPT | Performed by: NURSE PRACTITIONER

## 2025-06-13 PROCEDURE — 93005 ELECTROCARDIOGRAM TRACING: CPT

## 2025-06-13 PROCEDURE — 80053 COMPREHEN METABOLIC PANEL: CPT | Performed by: NURSE PRACTITIONER

## 2025-06-13 PROCEDURE — U0002 COVID-19 LAB TEST NON-CDC: HCPCS | Performed by: NURSE PRACTITIONER

## 2025-06-13 PROCEDURE — 86803 HEPATITIS C AB TEST: CPT | Performed by: EMERGENCY MEDICINE

## 2025-06-13 PROCEDURE — 82962 GLUCOSE BLOOD TEST: CPT

## 2025-06-13 PROCEDURE — 84484 ASSAY OF TROPONIN QUANT: CPT | Performed by: NURSE PRACTITIONER

## 2025-06-13 PROCEDURE — 93010 ELECTROCARDIOGRAM REPORT: CPT | Mod: ,,, | Performed by: INTERNAL MEDICINE

## 2025-06-13 PROCEDURE — 25000003 PHARM REV CODE 250: Performed by: EMERGENCY MEDICINE

## 2025-06-13 PROCEDURE — 83880 ASSAY OF NATRIURETIC PEPTIDE: CPT | Performed by: EMERGENCY MEDICINE

## 2025-06-13 PROCEDURE — 83690 ASSAY OF LIPASE: CPT | Performed by: NURSE PRACTITIONER

## 2025-06-13 PROCEDURE — 96360 HYDRATION IV INFUSION INIT: CPT

## 2025-06-13 PROCEDURE — 85025 COMPLETE CBC W/AUTO DIFF WBC: CPT | Performed by: NURSE PRACTITIONER

## 2025-06-13 PROCEDURE — 96361 HYDRATE IV INFUSION ADD-ON: CPT

## 2025-06-13 RX ORDER — ACETAMINOPHEN 500 MG
1000 TABLET ORAL
Status: COMPLETED | OUTPATIENT
Start: 2025-06-13 | End: 2025-06-13

## 2025-06-13 RX ADMIN — SODIUM CHLORIDE 500 ML: 9 INJECTION, SOLUTION INTRAVENOUS at 08:06

## 2025-06-13 RX ADMIN — ACETAMINOPHEN 1000 MG: 500 TABLET ORAL at 09:06

## 2025-06-13 RX ADMIN — SODIUM CHLORIDE 1000 ML: 9 INJECTION, SOLUTION INTRAVENOUS at 10:06

## 2025-06-13 NOTE — FIRST PROVIDER EVALUATION
"Medical screening examination initiated.  I have conducted a focused provider triage encounter, findings are as follows:    Brief history of present illness:  PT. C/o gen body aches, nausea.     Vitals:    06/13/25 1622   BP: 123/74   Pulse: 107   Resp: 18   Temp: 98.7 °F (37.1 °C)   TempSrc: Oral   SpO2: 98%   Weight: 89.8 kg (198 lb)   Height: 4' 8" (1.422 m)       Pertinent physical exam:  nad    Brief workup plan:  labs    Preliminary workup initiated; this workup will be continued and followed by the physician or advanced practice provider that is assigned to the patient when roomed.  "

## 2025-06-13 NOTE — TELEPHONE ENCOUNTER
Returned call to patient to discuss call in pertaining to weakness and dizziness. Pt stated BS-213 only with taking oral medications and Omnipod. Pt unable to do fingerstick check on BS or Blood pressure due to not being at home. Pt unsure of amount of insulin given with omnipod. Pt stated she is having weakness, feeling tired, shaky, arm pain since 1am. Pt did go to [facility] to get ID today, but is feeling tired now since back at home. Consulted with provider on in clinic(Ayush). Provider advised pt go to ER. Informed pt of provider recommendation. Pt will have daughter and family take her to [facility]   On metoprolol tartate 100mg bid and Xarelto  - HR <110 at this time   - continue metoprolol tartate 100mg bid  - discussed with pharmacy, can restart Xarelto with Crcl on 3/2 On metoprolol tartate 100mg bid and Xarelto  - HR <110 at this time   - continue metoprolol tartate 100mg bid  - discussed with pharmacy, restart Xarelto with Crcl on 3/2

## 2025-06-13 NOTE — TELEPHONE ENCOUNTER
Copied from CRM #[id number]. Topic: General Inquiry - Patient Advice  >> Jun 13, 2025  3:24 PM Beverly wrote:  ..Type:  Patient Requesting Call    Who Called: Yolanda Betts  What is the call regarding?: pt is dizzy and weak and would like to speak with the office.  Would the patient rather a call back or a response via [facility]?  call  Best Call Back Number: [phone] (home)  Additional Information:

## 2025-06-13 NOTE — TELEPHONE ENCOUNTER
Copied from CRM #[id number]. Topic: Appointments - Same Day Access  >> Jun 13, 2025  3:16 PM Beverly wrote:  ..Type:  Same Day Appointment Request    Caller is requesting a same day appointment.  Caller declined first available appointment listed below.    Name of Caller: Yolanda Betts  When is the first available appointment?   Symptoms: in pain and very weak.  Best Call Back Number: [phone] (home)  Additional Information:

## 2025-06-14 VITALS
OXYGEN SATURATION: 98 % | HEART RATE: 90 BPM | DIASTOLIC BLOOD PRESSURE: 72 MMHG | TEMPERATURE: 99 F | WEIGHT: 198 LBS | SYSTOLIC BLOOD PRESSURE: 129 MMHG | RESPIRATION RATE: 17 BRPM | HEIGHT: 56 IN | BODY MASS INDEX: 44.54 KG/M2

## 2025-06-14 PROBLEM — E86.0 DEHYDRATION: Status: ACTIVE | Noted: 2025-06-14

## 2025-06-14 PROBLEM — R07.9 CHEST PAIN: Status: ACTIVE | Noted: 2025-06-14

## 2025-06-14 PROBLEM — R11.0 NAUSEA: Status: ACTIVE | Noted: 2025-06-14

## 2025-06-14 LAB
BILIRUB UR QL STRIP.AUTO: NEGATIVE
CLARITY UR: CLEAR
COLOR UR AUTO: YELLOW
GLUCOSE UR QL STRIP: NEGATIVE
HGB UR QL STRIP: NEGATIVE
HOLD SPECIMEN: NORMAL
KETONES UR QL STRIP: NEGATIVE
LEUKOCYTE ESTERASE UR QL STRIP: NEGATIVE
NITRITE UR QL STRIP: NEGATIVE
OHS QRS DURATION: 74 MS
OHS QTC CALCULATION: 434 MS
PH UR STRIP: 6 [PH]
PROT UR QL STRIP: NEGATIVE
SP GR UR STRIP: 1.02
UROBILINOGEN UR STRIP-ACNC: NEGATIVE EU/DL

## 2025-06-14 PROCEDURE — 81003 URINALYSIS AUTO W/O SCOPE: CPT | Performed by: NURSE PRACTITIONER

## 2025-06-14 PROCEDURE — 96361 HYDRATE IV INFUSION ADD-ON: CPT

## 2025-06-14 PROCEDURE — 25000003 PHARM REV CODE 250: Performed by: EMERGENCY MEDICINE

## 2025-06-14 PROCEDURE — 99285 EMERGENCY DEPT VISIT HI MDM: CPT | Mod: 25

## 2025-06-14 RX ORDER — ONDANSETRON 4 MG/1
4 TABLET, FILM COATED ORAL EVERY 6 HOURS
Qty: 12 TABLET | Refills: 0 | Status: SHIPPED | OUTPATIENT
Start: 2025-06-14

## 2025-06-14 RX ORDER — ONDANSETRON 4 MG/1
4 TABLET, FILM COATED ORAL EVERY 6 HOURS
Qty: 12 TABLET | Refills: 0 | Status: SHIPPED | OUTPATIENT
Start: 2025-06-14 | End: 2025-06-14

## 2025-06-14 RX ADMIN — SODIUM CHLORIDE 1000 ML: 9 INJECTION, SOLUTION INTRAVENOUS at 12:06

## 2025-06-14 NOTE — ED NOTES
Minimal amount of urine noted in specimen cup @bedside. Pt educated to notify staff when able to urinate more via purewick, verbalized understanding. Lights turned off per pt request d/t reported migraine. Pt reports Hx of migraines, does not take Rx @home d/t side effects/intolerance. MD notified

## 2025-06-14 NOTE — ED PROVIDER NOTES
"SCRIBE #1 NOTE: I, Joshua Marc, am scribing for, and in the presence of, Miguel Kulkarni Jr., MD. I have scribed the entire note.       History     Chief Complaint   Patient presents with    Weakness     Pt reports generalized weakness and feeling "shaky" x 2 days. Pt reports her CBG has been normal. Pt also reports feeling dizzy and nauseated. +chest pain +SOB +mid abdominal pain -vomiting -diarrhea      Review of patient's allergies indicates:   Allergen Reactions    Codeine Itching    Hydromorphone Other (See Comments)     Can't wake up for long time if taken    Slow to wake up after surgery after receiving    Aleve [naproxen sodium]      Increases BP    Ibuprofen      Increases BP    Neuromuscular blockers, steroidal Hives     some    Pregabalin Itching    Versed [midazolam]     Latex, natural rubber Rash    Morphine Rash     itching    Norco [hydrocodone-acetaminophen] Itching, Rash and Hallucinations    Secobarbital sodium Rash     itching    Tylox [oxycodone-acetaminophen] Rash         History of Present Illness     HPI    6/13/2025, 8:39 PM  History obtained from the patient      History of Present Illness: Yolanda Betts is a 55 y.o. female patient with a PMHx of T2DM, anemia, HTN who presents to the Emergency Department for evaluation of generalized weakness which onset gradually 1 month ago. Patient states she was seen here previously for the same complaint. She reports she had a CBG >500 and was taking steroids for an iron infusion. Symptoms are constant and moderate in severity. No mitigating or exacerbating factors reported. Associated sxs include nausea, dysuria, right shoulder pain that radiates to her right arm, and lower abdominal pain. Patient denies any constipation, diarrhea, vomiting, and all other sxs at this time. No further complaints or concerns at this time.       Arrival mode: Personal vehicle    PCP: Rose Khan DNP        Past Medical History:  Past Medical History: "   Diagnosis Date    Abnormal Pap smear of cervix     HPV genital warts    Anemia     Anxiety     Arthritis     Asthma 10/11/2016    Bipolar 1 disorder     Diabetes mellitus, type 2     Dyslipidemia associated with type 2 diabetes mellitus 2019    Dyspnea due to COVID-19 2024    General anesthetics causing adverse effect in therapeutic use     Genital warts     GERD (gastroesophageal reflux disease)     Herpes simplex virus (HSV) infection     Hyperlipidemia     Hypertension     Hypertension complicating diabetes 2019    Migraine with aura and without status migrainosus, not intractable 2016    Mild persistent asthma without complication 10/11/2016    Morbid obesity with body mass index (BMI) of 45.0 to 49.9 in adult 2017    Myoclonus     Obstructive sleep apnea     ALEN (obstructive sleep apnea)     2L per N/C q HS    Schizoaffective disorder, bipolar type 2019    Seasonal allergic rhinitis due to pollen 2019    Seizures     Type 2 diabetes mellitus with hyperglycemia, with long-term current use of insulin 2017    Type 2 diabetes mellitus with hyperglycemia, without long-term current use of insulin 2017       Past Surgical History:  Past Surgical History:   Procedure Laterality Date    abcess removed right back      ANGIOGRAM, CORONARY, WITH LEFT HEART CATHETERIZATION N/A 2024    Procedure: Angiogram, Coronary, with Left Heart Cath;  Surgeon: Jaimie Wills MD;  Location: [facility] CATH LAB;  Service: Cardiology;  Laterality: N/A;    BELT ABDOMINOPLASTY      BREAST SURGERY Bilateral     Reduction    CARPAL TUNNEL RELEASE Bilateral 2023    Procedure: RELEASE, CARPAL TUNNEL;  Surgeon: Neville Roth MD;  Location: [facility];  Service: Orthopedics;  Laterality: Bilateral;  bilateral carpal tunnel release     SECTION      X 2    COLONOSCOPY      COLONOSCOPY N/A 2018    Procedure: colonoscopy for iron deficiency anemia;  Surgeon:  Frankie Sanchez MD;  Location: [facility];  Service: Endoscopy;  Laterality: N/A;    COLONOSCOPY N/A 2/28/2018    Procedure: COLONOSCOPY;  Surgeon: Frankie Sanchez MD;  Location: [facility] [place] ENDO;  Service: Endoscopy;  Laterality: N/A;    COLONOSCOPY N/A 3/10/2023    Procedure: COLONOSCOPY;  Surgeon: Lalita Montes De Oca MD;  Location: [facility] [place] ENDO;  Service: Endoscopy;  Laterality: N/A;    COLONOSCOPY N/A 4/4/2024    Procedure: COLONOSCOPY;  Surgeon: Tara Og MD;  Location: [facility] [place] ENDO;  Service: Endoscopy;  Laterality: N/A;    EPIDURAL STEROID INJECTION INTO CERVICAL SPINE N/A 1/31/2023    Procedure: C6/7 IL ROCAEL RN IV Sedation;  Surgeon: Otto Phillips MD;  Location: [facility] PAIN MGT;  Service: Pain Management;  Laterality: N/A;    EPIDURAL STEROID INJECTION INTO CERVICAL SPINE N/A 1/30/2024    Procedure: C5/6 IL ROCAEL;  Surgeon: Otto Phillips MD;  Location: [facility] PAIN MGT;  Service: Pain Management;  Laterality: N/A;    ESOPHAGOGASTRODUODENOSCOPY N/A 3/10/2023    Procedure: EGD (ESOPHAGOGASTRODUODENOSCOPY);  Surgeon: Lalita Montes De Oca MD;  Location: [facility];  Service: Endoscopy;  Laterality: N/A;    HYSTERECTOMY      w/ BSO; hypermenorrhea    INJECTION OF ANESTHETIC AGENT AROUND MEDIAL BRANCH NERVES INNERVATING CERVICAL FACET JOINT Bilateral 12/19/2023    Procedure: Bilateral C5-7 MBB (diagnostic);  Surgeon: Otto Phillips MD;  Location: [facility] PAIN MGT;  Service: Pain Management;  Laterality: Bilateral;    MOUTH SURGERY  1996    OOPHORECTOMY      hyst/bso, hypermenorrhea    ROBOT-ASSISTED CHOLECYSTECTOMY USING DA DARI XI N/A 1/3/2020    Procedure: XI ROBOTIC CHOLECYSTECTOMY;  Surgeon: Damir Driscoll MD;  Location: [facility];  Service: General;  Laterality: N/A;    TOTAL REDUCTION MAMMOPLASTY      TUBAL LIGATION           Family History:  Family History   Problem Relation Name Age of Onset    Diabetes Mother      Hyperlipidemia Mother      Hypertension Mother      Asthma Mother      COPD Mother       "Glaucoma Mother      Thyroid disease Mother      Anesthesia problems Mother          "almost had a cardiac arrest" , blood clots    Hypertension Father      Hyperlipidemia Father      Cancer Father          Brain, lung, liver, kidney    No Known Problems Sister      Hypertension Brother      Alcohol abuse Brother      Heart disease Maternal Grandmother      Hyperlipidemia Maternal Grandmother      Hypertension Maternal Grandmother      Cataracts Maternal Grandmother      Diabetes Maternal Grandmother      Heart disease Maternal Grandfather      Hyperlipidemia Maternal Grandfather      Hypertension Maternal Grandfather      Glaucoma Maternal Grandfather      Cancer Maternal Grandfather      Cataracts Maternal Grandfather      Macular degeneration Maternal Grandfather      Diabetes Maternal Grandfather      Heart disease Paternal Grandmother      Hyperlipidemia Paternal Grandmother      Hypertension Paternal Grandmother      Cataracts Paternal Grandmother      Heart disease Paternal Grandfather      Hyperlipidemia Paternal Grandfather      Hypertension Paternal Grandfather      Cataracts Paternal Grandfather      Breast cancer Maternal Cousin      Breast cancer Maternal Cousin      Breast cancer Maternal Cousin      Breast cancer Maternal Cousin         Social History:  Social History     Tobacco Use    Smoking status: Never     Passive exposure: Never    Smokeless tobacco: Never   Substance and Sexual Activity    Alcohol use: Not Currently     Comment: socially  No alcohol 72h prior to sx    Drug use: No    Sexual activity: Yes     Partners: Male     Birth control/protection: Surgical     Comment: [facility]        Review of Systems     Review of Systems   Constitutional:  Negative for fever.   HENT:  Negative for sore throat.    Respiratory:  Negative for shortness of breath.    Cardiovascular:  Negative for chest pain.   Gastrointestinal:  Positive for abdominal pain. Negative for nausea.   Genitourinary:  Positive for " dysuria.   Musculoskeletal:  Negative for back pain.   Skin:  Negative for rash.   Neurological:  Positive for weakness (generalized).   Hematological:  Does not bruise/bleed easily.   All other systems reviewed and are negative.       Physical Exam     Initial Vitals [06/13/25 1622]   BP Pulse Resp Temp SpO2   123/74 107 18 98.7 °F (37.1 °C) 98 %      MAP       --          Physical Exam   Nursing Notes and Vital Signs Reviewed.  Constitutional: Patient is in no acute distress. Well-developed and well-nourished.  Head: Atraumatic. Normocephalic.  Eyes: PERRL. EOM intact. Conjunctivae are not pale. No scleral icterus.  ENT: Mucous membranes are moist. Oropharynx is clear and symmetric.    Neck: Supple. Full ROM. No lymphadenopathy.  Cardiovascular: Regular rate. Regular rhythm. No murmurs, rubs, or gallops. Distal pulses are 2+ and symmetric.  Pulmonary/Chest: No respiratory distress. Clear to auscultation bilaterally. No wheezing or rales.  Abdominal: Soft and non-distended.  There is no tenderness.  No rebound, guarding, or rigidity. Good bowel sounds.  Genitourinary: No CVA tenderness  Musculoskeletal: Moves all extremities. No obvious deformities. No edema. No calf tenderness.  Skin: Warm and dry.  Neurological:  Alert, awake, and appropriate.  Normal speech.  No acute focal neurological deficits are appreciated.  Psychiatric: Normal affect. Good eye contact. Appropriate in content.     ED Course   Procedures  ED Vital Signs:  Vitals:    06/13/25 2036 06/13/25 2038 06/13/25 2100 06/13/25 2125   BP: 113/66 109/63  (!) 114/57   Pulse: 96 97 92 90   Resp: 18 18  17   Temp:       TempSrc:       SpO2: 97% 96% 100% 99%   Weight:       Height:        06/13/25 2132 06/13/25 2202 06/13/25 2232 06/13/25 2302   BP: 118/65 119/67 116/62 115/74   Pulse: 90 90 86 88   Resp:   17    Temp:       TempSrc:       SpO2: 99% 98% 96% 98%   Weight:       Height:        06/13/25 2332 06/14/25 0002 06/14/25 0032 06/14/25 0102   BP:  136/67 128/83 130/73 122/71   Pulse: 84 86 84 83   Resp: 16  16    Temp:       TempSrc:       SpO2: (!) 94% (!) 94% 95% 96%   Weight:       Height:        06/14/25 0130 06/14/25 0200 06/14/25 0230   BP:   129/72   Pulse: 92 91 90   Resp:   17   Temp:      TempSrc:      SpO2: 95% 98% 98%   Weight:      Height:          Abnormal Lab Results:  Labs Reviewed   COMPREHENSIVE METABOLIC PANEL - Abnormal       Result Value    Sodium 135 (*)     Potassium 4.0      Chloride 102      CO2 21 (*)     Glucose 136 (*)     BUN 13      Creatinine 0.7      Calcium 9.8      Protein Total 7.6      Albumin 3.6      Bilirubin Total 0.2            AST 18      ALT 25      Anion Gap 12      eGFR >60     CBC WITH DIFFERENTIAL - Abnormal    WBC 9.46      RBC 4.55      HGB 10.6 (*)     HCT 36.0 (*)     MCV 79 (*)     MCH 23.3 (*)     MCHC 29.4 (*)     RDW 19.3 (*)     Platelet Count 334      MPV 9.1 (*)     Nucleated RBC 0      Neut % 59.4      Lymph % 32.3      Mono % 5.9      Eos % 1.3      Basophil % 0.4      Imm Grans % 0.7 (*)     Neut # 5.61      Lymph # 3.06      Mono # 0.56      Eos # 0.12      Baso # 0.04      Imm Grans # 0.07 (*)    POCT GLUCOSE - Abnormal    POCT Glucose 182 (*)    INFLUENZA A & B BY MOLECULAR - Normal    INFLUENZA A MOLECULAR Negative      INFLUENZA B MOLECULAR  Negative     HEPATITIS C ANTIBODY - Normal    Hep C Ab Interp Negative     TROPONIN I - Normal    Troponin-I <0.006     LIPASE - Normal    Lipase Level 17     SARS-COV-2 RNA AMPLIFICATION, QUAL - Normal    SARS COV-2 Molecular Negative     URINALYSIS, REFLEX TO URINE CULTURE - Normal    Color, UA Yellow      Appearance, UA Clear      pH, UA 6.0      Spec Grav UA 1.020      Protein, UA Negative      Glucose, UA Negative      Ketones, UA Negative      Bilirubin, UA Negative      Blood, UA Negative      Nitrites, UA Negative      Urobilinogen, UA Negative      Leukocyte Esterase, UA Negative     B-TYPE NATRIURETIC PEPTIDE - Normal    BNP <10     HEP C  VIRUS HOLD SPECIMEN    Extra Tube Hold for add-ons.     CBC W/ AUTO DIFFERENTIAL    Narrative:     The following orders were created for panel order CBC auto differential.  Procedure                               Abnormality         Status                     ---------                               -----------         ------                     CBC with Differential[[id number]]       Abnormal            Final result                 Please view results for these tests on the individual orders.   GREY TOP URINE HOLD    Extra Tube Hold for add-ons.     POCT GLUCOSE MONITORING CONTINUOUS        All Lab Results:  Results for orders placed or performed during the hospital encounter of 06/13/25   POCT glucose    Collection Time: 06/13/25  4:26 PM   Result Value Ref Range    POCT Glucose 182 (H) 70 - 110 mg/dL   Influenza A & B by Molecular    Collection Time: 06/13/25  5:00 PM    Specimen: Nasal Swab   Result Value Ref Range    INFLUENZA A MOLECULAR Negative Negative    INFLUENZA B MOLECULAR  Negative Negative   Hepatitis C Antibody    Collection Time: 06/13/25  5:00 PM   Result Value Ref Range    Hep C Ab Interp Negative Negative   HCV Virus Hold Specimen    Collection Time: 06/13/25  5:00 PM   Result Value Ref Range    Extra Tube Hold for add-ons.    Comprehensive metabolic panel    Collection Time: 06/13/25  5:00 PM   Result Value Ref Range    Sodium 135 (L) 136 - 145 mmol/L    Potassium 4.0 3.5 - 5.1 mmol/L    Chloride 102 95 - 110 mmol/L    CO2 21 (L) 23 - 29 mmol/L    Glucose 136 (H) 70 - 110 mg/dL    BUN 13 6 - 20 mg/dL    Creatinine 0.7 0.5 - 1.4 mg/dL    Calcium 9.8 8.7 - 10.5 mg/dL    Protein Total 7.6 6.0 - 8.4 gm/dL    Albumin 3.6 3.5 - 5.2 g/dL    Bilirubin Total 0.2 0.1 - 1.0 mg/dL     40 - 150 unit/L    AST 18 11 - 45 unit/L    ALT 25 10 - 44 unit/L    Anion Gap 12 8 - 16 mmol/L    eGFR >60 >60 mL/min/1.73/m2   Troponin I    Collection Time: 06/13/25  5:00 PM   Result Value Ref Range    Troponin-I  <0.006 <=0.026 ng/mL   Lipase    Collection Time: 06/13/25  5:00 PM   Result Value Ref Range    Lipase Level 17 4 - 60 U/L   COVID-19 Rapid Screening    Collection Time: 06/13/25  5:00 PM   Result Value Ref Range    SARS COV-2 Molecular Negative Negative   CBC with Differential    Collection Time: 06/13/25  5:00 PM   Result Value Ref Range    WBC 9.46 3.90 - 12.70 K/uL    RBC 4.55 4.00 - 5.40 M/uL    HGB 10.6 (L) 12.0 - 16.0 gm/dL    HCT 36.0 (L) 37.0 - 48.5 %    MCV 79 (L) 82 - 98 fL    MCH 23.3 (L) 27.0 - 31.0 pg    MCHC 29.4 (L) 32.0 - 36.0 g/dL    RDW 19.3 (H) 11.5 - 14.5 %    Platelet Count 334 150 - 450 K/uL    MPV 9.1 (L) 9.2 - 12.9 fL    Nucleated RBC 0 <=0 /100 WBC    Neut % 59.4 38 - 73 %    Lymph % 32.3 18 - 48 %    Mono % 5.9 4 - 15 %    Eos % 1.3 <=8 %    Basophil % 0.4 <=1.9 %    Imm Grans % 0.7 (H) 0.0 - 0.5 %    Neut # 5.61 1.8 - 7.7 K/uL    Lymph # 3.06 1 - 4.8 K/uL    Mono # 0.56 0.3 - 1 K/uL    Eos # 0.12 <=0.5 K/uL    Baso # 0.04 <=0.2 K/uL    Imm Grans # 0.07 (H) 0.00 - 0.04 K/uL   BNP    Collection Time: 06/13/25  8:47 PM   Result Value Ref Range    BNP <10 0 - 99 pg/mL   Urinalysis, Reflex to Urine Culture Urine, Clean Catch    Collection Time: 06/14/25  1:29 AM    Specimen: Urine, Catheterized   Result Value Ref Range    Color, UA Yellow Straw, Milagros, Yellow, Light-Orange    Appearance, UA Clear Clear    pH, UA 6.0 5.0 - 8.0    Spec Grav UA 1.020 1.005 - 1.030    Protein, UA Negative Negative    Glucose, UA Negative Negative    Ketones, UA Negative Negative    Bilirubin, UA Negative Negative    Blood, UA Negative Negative    Nitrites, UA Negative Negative    Urobilinogen, UA Negative <2.0 EU/dL    Leukocyte Esterase, UA Negative Negative   GREY TOP URINE HOLD    Collection Time: 06/14/25  1:29 AM   Result Value Ref Range    Extra Tube Hold for add-ons.      *Note: Due to a large number of results and/or encounters for the requested time period, some results have not been displayed. A complete  set of results can be found in Results Review.         Imaging Results:  Imaging Results              X-Ray Chest AP Portable (Final result)  Result time 06/13/25 20:36:38      Final result by Nolvia Justice MD (06/13/25 20:36:38)                   Narrative:    EXAM: XR CHEST AP PORTABLE    CLINICAL HISTORY: Chest pain    PRIOR:  06/07/2025    FINDINGS:   The lungs are well aerated. No shift of the mediastinum.  No sizable pleural effusion or pneumothorax.    IMPRESSION:  No active or adverse finding    Finalized on: 6/13/2025 8:36 PM By:  Nolvia Justice MD  [facility]# [id number]      2025-06-13 20:38:43.298     [facility]                                     The EKG was ordered, reviewed, and independently interpreted by the ED provider.  Interpretation time: 16:24  Rate: 108 BPM  Rhythm: sinus tachycardia  Interpretation: Cannot rule out anterior infarct. T wave abnormality. No STEMI.    The Emergency Provider reviewed the vital signs and test results, which are outlined above.     ED Discussion       2:15 AM: Reassessed pt at this time. Patient states she is feeling better. Discussed with pt all pertinent ED information and results. Discussed pt dx and plan of tx. Gave pt all f/u and return to the ED instructions. All questions and concerns were addressed at this time. Pt expresses understanding of information and instructions, and is comfortable with plan to discharge. Pt is stable for discharge.    I discussed with patient and/or family/caretaker that evaluation in the ED does not suggest any emergent or life threatening medical conditions requiring immediate intervention beyond what was provided in the ED, and I believe patient is safe for discharge.  Regardless, an unremarkable evaluation in the ED does not preclude the development or presence of a serious of life threatening condition. As such, patient was instructed to return immediately for any worsening or change in current symptoms.    Regarding HYPERGLYCEMIA,  I advised patient that symptoms of an elevated blood glucose include fatigue, frequent urination, blurry vision, and feeling thirsty.  I advised patient that hyperglycemia can occur when one eats too much food, is ill, is under a lot of stress,  or does not take medications for diabetes as prescribed.  I reiterated that healthy eating is an important part of controlling blood glucose level.  I recommended that the patient: eat a variety of foods every day from all the food groups; eat meals at regular times every day and do not skip meals; choose high fiber carbohydrates (such as whole grains, legumes, and fruit) more often than less healthy carbohydrates (like white bread and sweets); and drink water and sugar-free beverages rather than sweetened beverages. I instructed patient to see care in the ED or with primary care provider if they experience: severe abdominal pain;  pain that spreads to the back; have increased vomiting; have trouble staying awake or focusing; are shaking or sweating; have blurred or double vision; have a fruity, sweet smell to breath; experience breathing  that is deep and labored, or rapid and shallow; or have a heartbeat that is fast and weak.    Regarding DEHYDRATION, advised patient to call 911 if they should experience confusion, dizziness, lethargy, and/or lightheadedness.  The patient was instructed to contact their primary care provider immediately or return to the ED for any of the following symptoms: blood in the stool or vomit; diarrhea or vomiting for an extended period of time (vomiting > 24 hours or diarrhea for > 3 days); dry mouth or dry eyes; poor skin turgor; listlessness and inactiveness; tachycardia; little or no urine output for 8 hours; inability to keep fluids down; and sunken eyes.  The patient was encouraged to drink plenty of water daily and to increase water intake when weather is hot or during exercise.         Medical Decision Making  Amount and/or Complexity of  Data Reviewed  Labs: ordered. Decision-making details documented in ED Course.  Radiology: ordered. Decision-making details documented in ED Course.  ECG/medicine tests: ordered and independent interpretation performed. Decision-making details documented in ED Course.    Risk  OTC drugs.  Prescription drug management.  Parenteral controlled substances.  Risk Details: OTC drugs, prescription drugs and controlled substances considered.  Due to patient's symptoms improving and pain controlled pain medications ordered appropriately.  Differential diagnosis: Dehydration, electrolyte abnormality, arrhythmia, infection, STEMI, NSTEMI, UTI among others.                   ED Medication(s):  Medications   sodium chloride 0.9% bolus 500 mL 500 mL (0 mLs Intravenous Stopped 6/13/25 2145)   acetaminophen tablet 1,000 mg (1,000 mg Oral Given 6/13/25 2121)   sodium chloride 0.9% bolus 1,000 mL 1,000 mL (0 mLs Intravenous Stopped 6/14/25 0007)   sodium chloride 0.9% bolus 1,000 mL 1,000 mL (0 mLs Intravenous Stopped 6/14/25 0111)       Discharge Medication List as of 6/14/2025  2:37 AM        START taking these medications    Details   !! ondansetron (ZOFRAN) 4 MG tablet Take 1 tablet (4 mg total) by mouth every 6 (six) hours., Starting Sat 6/14/2025, Normal       !! - Potential duplicate medications found. Please discuss with provider.           Follow-up Information       Rose Khan, DNP. Schedule an appointment as soon as possible for a visit in 1 week.    Specialty: Family Medicine  Contact information:  [address] [facility]  [address] [place] [id number] [phone]               [facility] - Emergency Dept..    Specialty: Emergency Medicine  Why: As needed, If symptoms worsen  Contact information:  [address]  [facility] [address] [phone]                               Scribe Attestation:   Scribe #1: I performed the above scribed service and the documentation accurately describes  the services I performed. I attest to the accuracy of the note.     Attending:   Physician Attestation Statement for Scribe #1: I, Miguel Kulkarni Jr., MD, personally performed the services described in this documentation, as scribed by Joshua Marc, in my presence, and it is both accurate and complete.           Clinical Impression       ICD-10-CM ICD-9-CM   1. Dehydration  E86.0 276.51   2. Chest pain  R07.9 786.50   3. Hyperglycemia  R73.9 790.29   4. Nausea  R11.0 787.02       Disposition:   Disposition: Discharged  Condition: Stable       Miguel Kulkarni Jr., MD  06/14/25 0315

## 2025-06-14 NOTE — DISCHARGE INSTRUCTIONS
Regarding HYPERGLYCEMIA, I advised patient that symptoms of an elevated blood glucose include fatigue, frequent urination, blurry vision, and feeling thirsty.  I advised patient that hyperglycemia can occur when one eats too much food, is ill, is under a lot of stress,  or does not take medications for diabetes as prescribed.  I reiterated that healthy eating is an important part of controlling blood glucose level.  I recommended that the patient: eat a variety of foods every day from all the food groups; eat meals at regular times every day and do not skip meals; choose high fiber carbohydrates (such as whole grains, legumes, and fruit) more often than less healthy carbohydrates (like white bread and sweets); and drink water and sugar-free beverages rather than sweetened beverages. I instructed patient to see care in the ED or with primary care provider if they experience: severe abdominal pain;  pain that spreads to the back; have increased vomiting; have trouble staying awake or focusing; are shaking or sweating; have blurred or double vision; have a fruity, sweet smell to breath; experience breathing  that is deep and labored, or rapid and shallow; or have a heartbeat that is fast and weak.    Regarding DEHYDRATION, advised patient to call 911 if they should experience confusion, dizziness, lethargy, and/or lightheadedness.  The patient was instructed to contact their primary care provider immediately or return to the ED for any of the following symptoms: blood in the stool or vomit; diarrhea or vomiting for an extended period of time (vomiting > 24 hours or diarrhea for > 3 days); dry mouth or dry eyes; poor skin turgor; listlessness and inactiveness; tachycardia; little or no urine output for 8 hours; inability to keep fluids down; and sunken eyes.  The patient was encouraged to drink plenty of water daily and to increase water intake when weather is hot or during exercise.

## 2025-06-16 ENCOUNTER — OFFICE VISIT (OUTPATIENT)
Dept: CARDIOLOGY | Facility: CLINIC | Age: 55
End: 2025-06-16
Payer: MEDICARE

## 2025-06-16 VITALS
DIASTOLIC BLOOD PRESSURE: 80 MMHG | BODY MASS INDEX: 45.03 KG/M2 | WEIGHT: 200.81 LBS | HEART RATE: 91 BPM | SYSTOLIC BLOOD PRESSURE: 130 MMHG | OXYGEN SATURATION: 98 %

## 2025-06-16 DIAGNOSIS — E11.9 HYPERTENSION COMPLICATING DIABETES: ICD-10-CM

## 2025-06-16 DIAGNOSIS — F31.9 BIPOLAR 1 DISORDER: ICD-10-CM

## 2025-06-16 DIAGNOSIS — R00.2 PALPITATIONS: ICD-10-CM

## 2025-06-16 DIAGNOSIS — Z98.890 HISTORY OF CORONARY ANGIOGRAM: ICD-10-CM

## 2025-06-16 DIAGNOSIS — E66.2 OBESITY HYPOVENTILATION SYNDROME: ICD-10-CM

## 2025-06-16 DIAGNOSIS — I10 ESSENTIAL HYPERTENSION: Primary | ICD-10-CM

## 2025-06-16 DIAGNOSIS — I10 HYPERTENSION COMPLICATING DIABETES: ICD-10-CM

## 2025-06-16 DIAGNOSIS — E78.2 MIXED HYPERLIPIDEMIA: ICD-10-CM

## 2025-06-16 PROCEDURE — 3075F SYST BP GE 130 - 139MM HG: CPT | Mod: CPTII,S$GLB,, | Performed by: INTERNAL MEDICINE

## 2025-06-16 PROCEDURE — 3079F DIAST BP 80-89 MM HG: CPT | Mod: CPTII,S$GLB,, | Performed by: INTERNAL MEDICINE

## 2025-06-16 PROCEDURE — 99999 PR PBB SHADOW E&M-EST. PATIENT-LVL IV: CPT | Mod: PBBFAC,,, | Performed by: INTERNAL MEDICINE

## 2025-06-16 PROCEDURE — 99214 OFFICE O/P EST MOD 30 MIN: CPT | Mod: S$GLB,,, | Performed by: INTERNAL MEDICINE

## 2025-06-16 PROCEDURE — 1159F MED LIST DOCD IN RCRD: CPT | Mod: CPTII,S$GLB,, | Performed by: INTERNAL MEDICINE

## 2025-06-16 PROCEDURE — 3008F BODY MASS INDEX DOCD: CPT | Mod: CPTII,S$GLB,, | Performed by: INTERNAL MEDICINE

## 2025-06-16 PROCEDURE — 3052F HG A1C>EQUAL 8.0%<EQUAL 9.0%: CPT | Mod: CPTII,S$GLB,, | Performed by: INTERNAL MEDICINE

## 2025-06-16 NOTE — PROGRESS NOTES
Subjective:   Patient ID:  06/[id number]      Yolanda Betts is a 55 y.o. female who presents for evaluation of No chief complaint on file.      History of Present Illness    CHIEF COMPLAINT:  Patient presents today for follow up after a fall a few weeks ago.    FALL HISTORY:  She slipped and fell while walking in front of her daughter's car after exiting the vehicle while wearing tennis shoes. She denies experiencing a seizure during the fall.    CURRENT SYMPTOMS:  She continues to experience weakness, dizziness, and lower abdominal pain. She recently visited the ED due to weakness and shakiness, where her glucose was found to be over 500 mg/dL.    MEDICAL HISTORY:  She has obesity hypoventilation syndrome and uses BiPAP machine during nighttime sleep and daytime naps. Cardiac catheterization in September last year was unremarkable. Endoscopy in 2023 revealed hiatal hernia.    MEDICATIONS:  She takes Amlodipine 10 mg and Atorvastatin 20 mg.         HPI    Past Medical History:   Diagnosis Date    Abnormal Pap smear of cervix     HPV genital warts    Anemia     Anxiety     Arthritis     Asthma 10/11/2016    Bipolar 1 disorder     Diabetes mellitus, type 2     Dyslipidemia associated with type 2 diabetes mellitus 05/13/2019    Dyspnea due to COVID-19 09/12/2024    General anesthetics causing adverse effect in therapeutic use     Genital warts     GERD (gastroesophageal reflux disease)     Herpes simplex virus (HSV) infection     Hyperlipidemia     Hypertension     Hypertension complicating diabetes 05/05/2019    Migraine with aura and without status migrainosus, not intractable 03/21/2016    Mild persistent asthma without complication 10/11/2016    Morbid obesity with body mass index (BMI) of 45.0 to 49.9 in adult 08/03/2017    Myoclonus     Obstructive sleep apnea     ALEN (obstructive sleep apnea)     2L per N/C q HS    Schizoaffective disorder, bipolar type 04/25/2019    Seasonal allergic rhinitis due to pollen  2019    Seizures     Type 2 diabetes mellitus with hyperglycemia, with long-term current use of insulin 2017    Type 2 diabetes mellitus with hyperglycemia, without long-term current use of insulin 2017       Past Surgical History:   Procedure Laterality Date    abcess removed right back      ANGIOGRAM, CORONARY, WITH LEFT HEART CATHETERIZATION N/A 2024    Procedure: Angiogram, Coronary, with Left Heart Cath;  Surgeon: Jaimie Wills MD;  Location: [facility] CATH LAB;  Service: Cardiology;  Laterality: N/A;    BELT ABDOMINOPLASTY  1996    BREAST SURGERY Bilateral 1996    Reduction    CARPAL TUNNEL RELEASE Bilateral 2023    Procedure: RELEASE, CARPAL TUNNEL;  Surgeon: Neville Roth MD;  Location: [facility] OR;  Service: Orthopedics;  Laterality: Bilateral;  bilateral carpal tunnel release     SECTION      X 2    COLONOSCOPY      COLONOSCOPY N/A 2018    Procedure: colonoscopy for iron deficiency anemia;  Surgeon: Frankie Sanchez MD;  Location: [facility];  Service: Endoscopy;  Laterality: N/A;    COLONOSCOPY N/A 2018    Procedure: COLONOSCOPY;  Surgeon: Frankie Sanchez MD;  Location: [facility];  Service: Endoscopy;  Laterality: N/A;    COLONOSCOPY N/A 3/10/2023    Procedure: COLONOSCOPY;  Surgeon: Lalita Montes De Oca MD;  Location: [facility];  Service: Endoscopy;  Laterality: N/A;    COLONOSCOPY N/A 2024    Procedure: COLONOSCOPY;  Surgeon: Tara Og MD;  Location: [facility];  Service: Endoscopy;  Laterality: N/A;    EPIDURAL STEROID INJECTION INTO CERVICAL SPINE N/A 2023    Procedure: C6/7 IL ROCAEL RN IV Sedation;  Surgeon: Otto Phillips MD;  Location: [facility] PAIN MGT;  Service: Pain Management;  Laterality: N/A;    EPIDURAL STEROID INJECTION INTO CERVICAL SPINE N/A 2024    Procedure: C5/6 IL ROCAEL;  Surgeon: Otto Phillips MD;  Location: [facility] PAIN MGT;  Service: Pain Management;  Laterality: N/A;    ESOPHAGOGASTRODUODENOSCOPY N/A  "3/10/2023    Procedure: EGD (ESOPHAGOGASTRODUODENOSCOPY);  Surgeon: Lalita Montes De Oca MD;  Location: [facility] ENDO;  Service: Endoscopy;  Laterality: N/A;    HYSTERECTOMY      w/ BSO; hypermenorrhea    INJECTION OF ANESTHETIC AGENT AROUND MEDIAL BRANCH NERVES INNERVATING CERVICAL FACET JOINT Bilateral 12/19/2023    Procedure: Bilateral C5-7 MBB (diagnostic);  Surgeon: Otto Phillips MD;  Location: [facility] PAIN MGT;  Service: Pain Management;  Laterality: Bilateral;    MOUTH SURGERY  1996    OOPHORECTOMY      hyst/bso, hypermenorrhea    ROBOT-ASSISTED CHOLECYSTECTOMY USING DA DARI XI N/A 1/3/2020    Procedure: XI ROBOTIC CHOLECYSTECTOMY;  Surgeon: Damir Driscoll MD;  Location: [facility] OR;  Service: General;  Laterality: N/A;    TOTAL REDUCTION MAMMOPLASTY      TUBAL LIGATION         Social History[1]    Family History   Problem Relation Name Age of Onset    Diabetes Mother      Hyperlipidemia Mother      Hypertension Mother      Asthma Mother      COPD Mother      Glaucoma Mother      Thyroid disease Mother      Anesthesia problems Mother          "almost had a cardiac arrest" , blood clots    Hypertension Father      Hyperlipidemia Father      Cancer Father          Brain, lung, liver, kidney    No Known Problems Sister      Hypertension Brother      Alcohol abuse Brother      Heart disease Maternal Grandmother      Hyperlipidemia Maternal Grandmother      Hypertension Maternal Grandmother      Cataracts Maternal Grandmother      Diabetes Maternal Grandmother      Heart disease Maternal Grandfather      Hyperlipidemia Maternal Grandfather      Hypertension Maternal Grandfather      Glaucoma Maternal Grandfather      Cancer Maternal Grandfather      Cataracts Maternal Grandfather      Macular degeneration Maternal Grandfather      Diabetes Maternal Grandfather      Heart disease Paternal Grandmother      Hyperlipidemia Paternal Grandmother      Hypertension Paternal Grandmother      Cataracts Paternal Grandmother   " "   Heart disease Paternal Grandfather      Hyperlipidemia Paternal Grandfather      Hypertension Paternal Grandfather      Cataracts Paternal Grandfather      Breast cancer Maternal Cousin      Breast cancer Maternal Cousin      Breast cancer Maternal Cousin      Breast cancer Maternal Cousin           Review of Systems   Cardiovascular:  Negative for chest pain, dyspnea on exertion, palpitations and syncope.   Neurological:  Positive for dizziness. Negative for focal weakness.       Current Outpatient Medications on File Prior to Visit   Medication Sig    albuterol (PROVENTIL) 2.5 mg /3 mL (0.083 %) nebulizer solution Take 2.5 mg by nebulization every 4 (four) hours as needed. Times a day    ALPRAZolam (XANAX) 1 MG tablet Take 1 tablet (1 mg total) by mouth daily as needed for Anxiety.    amLODIPine (NORVASC) 10 MG tablet Take 1 tablet (10 mg total) by mouth once daily.    atorvastatin (LIPITOR) 20 MG tablet Take 1 tablet (20 mg total) by mouth every evening    BD INSULIN SYRINGE ULTRA-FINE 1/2 mL 30 gauge x 1/2" Syrg     blood-glucose sensor (DEXCOM G6 SENSOR) Mariela change sensor every 10 days.    blood-glucose transmitter (DEXCOM G6 TRANSMITTER) Mariela 1 each by Misc.(Non-Drug; Combo Route) route every 3 (three) months.    clotrimazole-betamethasone 1-0.05% (LOTRISONE) cream Apply topically 2 (two) times daily.    diclofenac sodium (VOLTAREN) 1 % Gel Apply 2 g topically 3 (three) times daily as needed (pain)    DULoxetine (CYMBALTA) 60 MG capsule Take 1 capsule (60 mg total) by mouth 2 (two) times daily.    ergocalciferol (ERGOCALCIFEROL) 50,000 unit Cap Take 1 capsule (50,000 Units total) by mouth every 7 days.    eszopiclone (LUNESTA) 1 MG Tab Take 1 tablet (1 mg total) by mouth at bedtime as needed    fenofibrate (TRICOR) 145 MG tablet Take 1 tablet by mouth in the morning.    fluticasone propionate (FLONASE) 50 mcg/actuation nasal spray Take 1 spray by Each Nostril route in the morning.    folic acid (FOLVITE) 1 " "MG tablet Take 1 tablet by mouth every day in the morning    furosemide (LASIX) 20 MG tablet Take 1 tablet (20 mg total) by mouth once daily.    glucagon (BAQSIMI) 3 mg/actuation [place] SPRAY 1 SPRAY IN NOSTRIL AS NEEDED FOR EMERGENCY. MAY REPEAT 1 TIME AFTER 15 MINUTES    haloperidoL (HALDOL) 2 MG tablet Take 1 tablet (2 mg total) by mouth 2 (two) times daily.    hydrocortisone (ANUSOL-HC) 2.5 % rectal cream Place rectally once daily for 10 days    insulin aspart U-100 (NOVOLOG FLEXPEN U-100 INSULIN) 100 unit/mL (3 mL) InPn pen Inject 9 Units into the skin 3 (three) times daily before meals. Use sliding scale for small medium and large meals    insulin aspart U-100 (NOVOLOG U-100 INSULIN ASPART) 100 unit/mL injection To use via insulin pump. Up to 200 units every 2 days    insulin glargine U-100, Lantus, (LANTUS SOLOSTAR U-100 INSULIN) 100 unit/mL (3 mL) InPn pen Inject 16 Units into the skin once daily. Use in the event of pump failure    insulin pump cart,auto,BT,G6/7 (OMNIPOD 5 G6-G7 PODS, GEN 5,) Crtg 1 each by Misc.(Non-Drug; Combo Route) route every 48 hours.    insulin syringe-needle U-100 0.3 mL 30 gauge x 5/16" Syrg 1 each by Misc.(Non-Drug; Combo Route) route Every 3 (three) days.    ixekizumab (TALTZ AUTOINJECTOR) 80 mg/mL AtIn Inject 80 mg into the skin every 28 days.    lamoTRIgine (LAMICTAL) 100 MG tablet Take 1 tablet (100 mg total) by mouth 2 (two) times daily.    levETIRAcetam (KEPPRA) 750 MG Tab Take 1 tablet by mouth in the morning and 1 tablet before bedtime    levocetirizine (XYZAL) 5 MG tablet Take 1 tablet (5 mg total) by mouth every evening.    LIDOcaine (LIDODERM) 5 % Place 2 patches onto the skin every 12 (twelve) hours as needed (pain). Remove & Discard patch within 12 hours or as directed by MD    lifitegrast (XIIDRA) 5 % Dpet Place 1 drop into both eyes 2 (two) times daily.    linaCLOtide (LINZESS) 145 mcg Cap capsule Take 1 capsule (145 mcg total) by mouth daily as needed (for " "constipation).    magnesium oxide (MAG-OX) 400 mg (241.3 mg magnesium) tablet Take 1 tablet (400 mg total) by mouth once daily.    melatonin (MELATIN) 3 mg tablet Take 2 tablets (6 mg total) by mouth nightly.    metoprolol succinate (TOPROL-XL) 50 MG 24 hr tablet Take 1 tablet (50 mg total) by mouth every morning.    omeprazole (PRILOSEC) 40 MG capsule Take 1 capsule (40 mg total) by mouth once daily.    ondansetron (ZOFRAN) 4 MG tablet Take 4 mg by mouth every 6 (six) hours.    ondansetron (ZOFRAN) 4 MG tablet Take 1 tablet (4 mg total) by mouth every 6 (six) hours.    OZEMPIC 2 mg/dose (8 mg/3 mL) PnIj Inject 2 mg into the skin every 7 days.    pen needle, diabetic ([place] ULTRA-FINE MINI PEN NEEDLE) 31 gauge x 3/16" Ndle Use 1 a day in event of pump failure    polyethylene glycol (GLYCOLAX) 17 gram/dose powder Take 17 g by mouth 2 (two) times daily.    terconazole (TERAZOL 7) 0.4 % Crea Place 1 applicator vaginally once daily.    estradioL (ESTRACE) 0.01 % (0.1 mg/gram) vaginal cream Place 1 g vaginally twice a week.    [DISCONTINUED] clonazePAM (KLONOPIN) 1 MG tablet Take 1 tablet by mouth daily    [DISCONTINUED] glucagon, human recombinant, (GLUCAGON EMERGENCY KIT, HUMAN,) 1 mg SolR Inject 1 mg into the muscle as needed. Emergency use    [DISCONTINUED] valsartan (DIOVAN) 320 MG tablet Take 1 tablet (320 mg total) by mouth once daily.     No current facility-administered medications on file prior to visit.       Objective:   Objective:  Wt Readings from Last 3 Encounters:   06/16/25 91.1 kg (200 lb 13.4 oz)   06/13/25 89.8 kg (198 lb)   06/12/25 90.2 kg (198 lb 13.7 oz)     Temp Readings from Last 3 Encounters:   06/13/25 98.7 °F (37.1 °C) (Oral)   06/07/25 98.1 °F (36.7 °C) (Oral)   06/06/25 97.6 °F (36.4 °C)     BP Readings from Last 3 Encounters:   06/16/25 130/80   06/14/25 129/72   06/12/25 110/68     Pulse Readings from Last 3 Encounters:   06/16/25 91   06/14/25 90   06/07/25 96       Physical Exam  Vitals " reviewed.   Constitutional:       Appearance: She is well-developed.   Neck:      Vascular: No carotid bruit.   Cardiovascular:      Rate and Rhythm: Normal rate and regular rhythm.      Pulses: Intact distal pulses.      Heart sounds: Normal heart sounds. No murmur heard.  Pulmonary:      Breath sounds: Normal breath sounds.   Neurological:      Mental Status: She is oriented to person, place, and time.           Lab Results   Component Value Date    CHOL 179 11/30/2024    CHOL 209 (H) 04/29/2024    CHOL 107 (L) 08/08/2023     Lab Results   Component Value Date    LDLCALC 91.6 11/30/2024    LDLCALC 123 (H) 04/29/2024    LDLCALC 43.4 (L) 08/08/2023         Chemistry        Component Value Date/Time     (L) 06/13/2025 1700     (L) 03/16/2025 1119    K 4.0 06/13/2025 1700    K 5.0 03/16/2025 1119     06/13/2025 1700     03/16/2025 1119    CO2 21 (L) 06/13/2025 1700    CO2 19 (L) 03/16/2025 1119    BUN 13 06/13/2025 1700    CREATININE 0.7 06/13/2025 1700     (H) 06/13/2025 1700     (H) 03/16/2025 1119        Component Value Date/Time    CALCIUM 9.8 06/13/2025 1700    CALCIUM 9.0 03/16/2025 1119    ALKPHOS 109 06/13/2025 1700    ALKPHOS 116 03/16/2025 1119    AST 18 06/13/2025 1700    AST 14 03/16/2025 1119    ALT 25 06/13/2025 1700    ALT 24 03/16/2025 1119    BILITOT 0.2 06/13/2025 1700    BILITOT 0.1 03/16/2025 1119    ESTGFRAFRICA >60.0 03/08/2022 0735    EGFRNONAA >60.0 03/08/2022 0735          Lab Results   Component Value Date    TSH 1.703 04/12/2025       Lab Results   Component Value Date    WBC 9.46 06/13/2025    HGB 10.6 (L) 06/13/2025    HCT 36.0 (L) 06/13/2025    MCV 79 (L) 06/13/2025     06/13/2025       @LABRCNTIP(BNP,BNPTRIAGEBLO)@       Imaging:  ======    No results found for this or any previous visit.    No results found for this or any previous visit.    Results for orders placed during the hospital encounter of 04/01/25    X-Ray Chest PA And  Lateral    Narrative  EXAMINATION:  XR CHEST PA AND LATERAL    CLINICAL HISTORY:  Other forms of dyspnea    TECHNIQUE:  PA and lateral views of the chest were performed.    COMPARISON:  03/16/2025    FINDINGS:  The lungs are clear and free of infiltrate.  No pleural effusion or pneumothorax. The heart is enlarged.  There is tortuosity of the descending thoracic aorta.    Impression  1.  No acute cardiopulmonary process.      Electronically signed by: Nimesh Martinez DO  Date:    04/01/2025  Time:    09:07      No results found for this or any previous visit.      No valid procedures specified.        Results for orders placed during the hospital encounter of 09/03/24    Nuclear Stress - Cardiology Interpreted    Interpretation Summary    Normal myocardial perfusion scan. There is no evidence of myocardial ischemia or infarction.    There is a mild intensity perfusion abnormality in the anterior wall of the left ventricle, secondary to breast attenuation.    The gated perfusion images showed an ejection fraction of 64% at rest. The gated perfusion images showed an ejection fraction of 70% post stress.    The ECG portion of the study is negative for ischemia.    The patient reported no chest pain during the stress test.    There were no arrhythmias during stress.    TID score 1.34      Results for orders placed during the hospital encounter of 11/30/24    Echo    Interpretation Summary    Left Ventricle: The left ventricle is normal in size. Ventricular mass is normal. Normal wall thickness. There is concentric remodeling. Normal wall motion. There is normal systolic function with a visually estimated ejection fraction of 60 - 65%. Ejection fraction is approximately 65%. There is normal diastolic function.    Right Ventricle: Normal right ventricular cavity size. Wall thickness is normal. Systolic function is normal.    IVC/SVC: Normal venous pressure at 3 mmHg.      Results for orders placed during the hospital  encounter of 09/27/24    Cardiac catheterization    Conclusion    The left ventricular end diastolic pressure was elevated.    The pre-procedure left ventricular end diastolic pressure was 22.    The estimated blood loss was none.    The coronary arteries were normal..    There was diastolic dysfunction.    The filling pressures on the left were mildly elevated.    The procedure log was documented by Documenter: Elizabeth Rojas RN and verified by Jaimie Wills MD.    Date: 9/27/2024  Time: 10:17 AM      Lab Results   Component Value Date    HGBA1C 9.0 (H) 03/18/2025         The 10-year ASCVD risk score (Samuel CUEVAS, et al., 2019) is: 11.6%    Values used to calculate the score:      Age: 55 years      Sex: Female      Is Non- : Yes      Diabetic: Yes      Tobacco smoker: No      Systolic Blood Pressure: 130 mmHg      Is BP treated: Yes      HDL Cholesterol: 46 mg/dL      Total Cholesterol: 179 mg/dL    Diagnostic Results:  ECG: Reviewed    Assessment and Plan:   Essential hypertension    Palpitations    Obesity hypoventilation syndrome    Hypertension complicating diabetes    History of coronary angiogram    Mixed hyperlipidemia    Bipolar 1 disorder    BMI 45.0-49.9, adult        Assessment & Plan    E66.2 Obesity hypoventilation syndrome  Z68.42 BMI 45.0-49.9, adult  F31.9 Bipolar 1 disorder  I10 Essential hypertension  R00.2 Palpitations  E11.9, I10 Hypertension complicating diabetes  Z98.890 History of coronary angiogram  E78.2 Mixed hyperlipidemia    Recent fall while exiting daughter's car. No evidence of seizure activity.  ER visit for weakness and shakiness attributed to elevated blood sugar (>500) and dehydration.  Cardiac workup from ER visit showed normal troponin and BNP levels.  Heart catheterization in September last year was normal.  Current symptoms of weakness and dizziness likely related to diabetes neuropathy rather than cardiac issues.  CTA Head from April was  unremarkable.  Carotid duplex showed good blood flow to brain.  Heart monitor from about a year ago was normal.  Performed physical exam.    I10 ESSENTIAL HYPERTENSION:  - Continued Amlodipine 10 mg.    E11.9, I10 HYPERTENSION COMPLICATING DIABETES:  - Discussed neuropathy as a complication of diabetes, potentially causing dizziness.  - Patient to continue to follow with primary care physician for diabetes management.    E78.2 MIXED HYPERLIPIDEMIA:  - Continued Atorvastatin 20 mg.  - Follow up in 6 months.               Reviewed all tests and above medical conditions with patient in detail and formulated treatment plan.  Healthy weight goals were discussed in clinic    Follow up in  6 months         [1]   Social History  Tobacco Use    Smoking status: Never     Passive exposure: Never    Smokeless tobacco: Never   Substance Use Topics    Alcohol use: Not Currently     Comment: socially  No alcohol 72h prior to sx    Drug use: No

## 2025-06-18 ENCOUNTER — OFFICE VISIT (OUTPATIENT)
Dept: UROLOGY | Facility: CLINIC | Age: 55
End: 2025-06-18
Payer: MEDICARE

## 2025-06-18 VITALS
BODY MASS INDEX: 44.99 KG/M2 | HEIGHT: 56 IN | WEIGHT: 200 LBS | HEART RATE: 94 BPM | SYSTOLIC BLOOD PRESSURE: 127 MMHG | DIASTOLIC BLOOD PRESSURE: 80 MMHG

## 2025-06-18 DIAGNOSIS — R31.0 HEMATURIA, GROSS: Primary | ICD-10-CM

## 2025-06-18 DIAGNOSIS — Z80.52 FAMILY HISTORY OF BLADDER CANCER: ICD-10-CM

## 2025-06-18 DIAGNOSIS — N95.2 VAGINAL ATROPHY: ICD-10-CM

## 2025-06-18 PROBLEM — R29.3 POOR POSTURE: Status: RESOLVED | Noted: 2020-08-31 | Resolved: 2025-06-18

## 2025-06-18 PROBLEM — Z01.818 PREOP EXAMINATION: Status: RESOLVED | Noted: 2023-12-11 | Resolved: 2025-06-18

## 2025-06-18 PROBLEM — R52 PAIN: Status: RESOLVED | Noted: 2024-03-05 | Resolved: 2025-06-18

## 2025-06-18 PROCEDURE — 3074F SYST BP LT 130 MM HG: CPT | Mod: CPTII,S$GLB,, | Performed by: NURSE PRACTITIONER

## 2025-06-18 PROCEDURE — 99204 OFFICE O/P NEW MOD 45 MIN: CPT | Mod: S$GLB,,, | Performed by: NURSE PRACTITIONER

## 2025-06-18 PROCEDURE — 1159F MED LIST DOCD IN RCRD: CPT | Mod: CPTII,S$GLB,, | Performed by: NURSE PRACTITIONER

## 2025-06-18 PROCEDURE — 1160F RVW MEDS BY RX/DR IN RCRD: CPT | Mod: CPTII,S$GLB,, | Performed by: NURSE PRACTITIONER

## 2025-06-18 PROCEDURE — 3052F HG A1C>EQUAL 8.0%<EQUAL 9.0%: CPT | Mod: CPTII,S$GLB,, | Performed by: NURSE PRACTITIONER

## 2025-06-18 PROCEDURE — 3079F DIAST BP 80-89 MM HG: CPT | Mod: CPTII,S$GLB,, | Performed by: NURSE PRACTITIONER

## 2025-06-18 PROCEDURE — 99999 PR PBB SHADOW E&M-EST. PATIENT-LVL V: CPT | Mod: PBBFAC,,, | Performed by: NURSE PRACTITIONER

## 2025-06-18 PROCEDURE — 3008F BODY MASS INDEX DOCD: CPT | Mod: CPTII,S$GLB,, | Performed by: NURSE PRACTITIONER

## 2025-06-18 RX ORDER — ESTRADIOL 0.1 MG/G
1 CREAM VAGINAL
Qty: 42.5 G | Refills: 11 | Status: SHIPPED | OUTPATIENT
Start: 2025-06-19 | End: 2026-06-19

## 2025-06-18 NOTE — PROGRESS NOTES
Chief Complaint:   Gross hematuria    HPI:   Patient is a 55-year-old female that is presenting with painful, gross hematuria, on and off for 6 months.  No history of smoking, however, father  of bladder cancer.  States that she has painful urination for the last 6 months, was prescribed Estrace cream, discontinued it.  Urine in clinic indicates glucose, all other parameters are negative.  PVR is 114 mL.  States that she has severe constipation, is followed by GI.    Allergies:  Codeine; Hydromorphone; Aleve [naproxen sodium]; Ibuprofen; Neuromuscular blockers, steroidal; Pregabalin; Versed [midazolam]; Latex, natural rubber; Morphine; Norco [hydrocodone-acetaminophen]; Secobarbital sodium; and Tylox [oxycodone-acetaminophen]    Medications:  has a current medication list which includes the following prescription(s): albuterol, alprazolam, amlodipine, atorvastatin, bd insulin syringe ultra-fine, dexcom g6 sensor, dexcom g6 transmitter, clotrimazole-betamethasone 1-0.05%, diclofenac sodium, duloxetine, ergocalciferol, estradiol, eszopiclone, fenofibrate, fluticasone propionate, folic acid, furosemide, baqsimi, haloperidol, hydrocortisone, insulin aspart u-100, insulin aspart u-100, lantus solostar u-100 insulin, omnipod 5 g6-g7 pods (gen 5), insulin syringe-needle u-100, taltz autoinjector, lamotrigine, levetiracetam, levocetirizine, lidocaine, xiidra, linaclotide, magnesium oxide, melatonin, metoprolol succinate, omeprazole, ondansetron, ondansetron, ozempic, bd ultra-fine mini pen needle, polyethylene glycol, terconazole, [DISCONTINUED] clonazepam, [DISCONTINUED] glucagon emergency kit (human), and [DISCONTINUED] valsartan.    Review of Systems:  General: No fever, chills, fatigability, or weight loss.  Skin: No rashes, itching, or changes in color or texture of skin.  Chest: Denies COATS, cyanosis, wheezing, cough, and sputum production.  Abdomen: Appetite fine. No weight loss. Denies diarrhea, abdominal pain,  hematemesis, or blood in stool.  Severe constipation.  Musculoskeletal: No joint stiffness or swelling. Denies back pain.  : As above.  All other review of systems negative.    PMH:   has a past medical history of Abnormal Pap smear of cervix, Anemia, Anxiety, Arthritis, Asthma (10/11/2016), Bipolar 1 disorder, Diabetes mellitus, type 2, Dyslipidemia associated with type 2 diabetes mellitus (2019), Dyspnea due to COVID-19 (2024), General anesthetics causing adverse effect in therapeutic use, Genital warts, GERD (gastroesophageal reflux disease), Herpes simplex virus (HSV) infection, Hyperlipidemia, Hypertension, Hypertension complicating diabetes (2019), Migraine with aura and without status migrainosus, not intractable (2016), Mild persistent asthma without complication (10/11/2016), Morbid obesity with body mass index (BMI) of 45.0 to 49.9 in adult (2017), Myoclonus, Obstructive sleep apnea, ALEN (obstructive sleep apnea), Schizoaffective disorder, bipolar type (2019), Seasonal allergic rhinitis due to pollen (2019), Seizures, Type 2 diabetes mellitus with hyperglycemia, with long-term current use of insulin (2017), and Type 2 diabetes mellitus with hyperglycemia, without long-term current use of insulin (2017).    PSH:   has a past surgical history that includes  section; Mouth surgery (); Breast surgery (Bilateral, ); Tubal ligation; Colonoscopy; Belt abdominoplasty (); Colonoscopy (N/A, 2018); Colonoscopy (N/A, 2018); Oophorectomy; Hysterectomy; abcess removed right back; Robot-assisted cholecystectomy using da Sharonda Xi (N/A, 1/3/2020); Total Reduction Mammoplasty; Epidural steroid injection into cervical spine (N/A, 2023); Esophagogastroduodenoscopy (N/A, 3/10/2023); Colonoscopy (N/A, 3/10/2023); Carpal tunnel release (Bilateral, 2023); Injection of anesthetic agent around medial branch nerves innervating cervical  facet joint (Bilateral, 12/19/2023); Epidural steroid injection into cervical spine (N/A, 1/30/2024); Colonoscopy (N/A, 4/4/2024); and ANGIOGRAM, CORONARY, WITH LEFT HEART CATHETERIZATION (N/A, 9/27/2024).    FamHx: family history includes Alcohol abuse in her brother; Anesthesia problems in her mother; Asthma in her mother; Breast cancer in her maternal cousin, maternal cousin, maternal cousin, and maternal cousin; COPD in her mother; Cancer in her father and maternal grandfather; Cataracts in her maternal grandfather, maternal grandmother, paternal grandfather, and paternal grandmother; Diabetes in her maternal grandfather, maternal grandmother, and mother; Glaucoma in her maternal grandfather and mother; Heart disease in her maternal grandfather, maternal grandmother, paternal grandfather, and paternal grandmother; Hyperlipidemia in her father, maternal grandfather, maternal grandmother, mother, paternal grandfather, and paternal grandmother; Hypertension in her brother, father, maternal grandfather, maternal grandmother, mother, paternal grandfather, and paternal grandmother; Macular degeneration in her maternal grandfather; No Known Problems in her sister; Thyroid disease in her mother.    SocHx:  reports that she has never smoked. She has never been exposed to tobacco smoke. She has never used smokeless tobacco. She reports that she does not currently use alcohol. She reports that she does not use drugs.      Physical Exam:  Vitals:    06/18/25 0859   BP: 127/80   Pulse: 94     General:  Morbidly obese female, A&Ox3, no apparent distress, no deformities  Neck: No masses, normal thyroid  Lungs: normal inspiration, no use of accessory muscles  Heart: normal pulse, no arrhythmias  Abdomen: Soft, NT, ND, no masses, no hernias, no hepatosplenomegaly  Lymphatic: Neck and groin nodes negative  Skin: The skin is warm and dry. No jaundice.  Ext: No c/c/e.  :  Normal external female genitalia, vaginal atrophy.  No  cystocele or rectocele with Valsalva maneuver.  Labs/Studies:   See HPI    Impression/Plan:   Gross hematuria  Microhematuria  I reviewed the definition of gross hematuria and the etiology of microscopic hematuria with the patient including benign conditions such as kidney, ureteral, and bladder stones, kidney and bladder infections, large prostates(if she were male), but also noted that the most concerning etiology that we must rule out a is a renal or bladder neoplasm.  I reviewed the typical workup of hematuria which includes an imaging study, typically a CT urogram, and an office cystoscopy. We will move forward with scheduling a CT urogram with a office cystoscopy to follow.      F/u 1-2 weeks with CT urogram and office cystoscopy     Restart estrace cream twice weekly

## 2025-06-20 ENCOUNTER — PATIENT MESSAGE (OUTPATIENT)
Dept: DIABETES | Facility: CLINIC | Age: 55
End: 2025-06-20
Payer: MEDICARE

## 2025-06-20 ENCOUNTER — INFUSION (OUTPATIENT)
Dept: INFUSION THERAPY | Facility: HOSPITAL | Age: 55
End: 2025-06-20
Attending: INTERNAL MEDICINE
Payer: MEDICARE

## 2025-06-20 VITALS
OXYGEN SATURATION: 98 % | RESPIRATION RATE: 18 BRPM | DIASTOLIC BLOOD PRESSURE: 79 MMHG | HEART RATE: 80 BPM | SYSTOLIC BLOOD PRESSURE: 136 MMHG | TEMPERATURE: 98 F

## 2025-06-20 DIAGNOSIS — D50.0 IRON DEFICIENCY ANEMIA DUE TO CHRONIC BLOOD LOSS: Primary | ICD-10-CM

## 2025-06-20 PROCEDURE — 63600175 PHARM REV CODE 636 W HCPCS: Mod: JZ,TB | Performed by: NURSE PRACTITIONER

## 2025-06-20 PROCEDURE — 96374 THER/PROPH/DIAG INJ IV PUSH: CPT

## 2025-06-20 PROCEDURE — 96375 TX/PRO/DX INJ NEW DRUG ADDON: CPT

## 2025-06-20 PROCEDURE — 63600175 PHARM REV CODE 636 W HCPCS: Performed by: INTERNAL MEDICINE

## 2025-06-20 RX ORDER — SODIUM CHLORIDE 0.9 % (FLUSH) 0.9 %
10 SYRINGE (ML) INJECTION
OUTPATIENT
Start: 2025-06-27

## 2025-06-20 RX ORDER — HEPARIN 100 UNIT/ML
500 SYRINGE INTRAVENOUS
OUTPATIENT
Start: 2025-06-27

## 2025-06-20 RX ORDER — SODIUM FERRIC GLUCONATE COMPLEX IN SUCROSE 12.5 MG/ML
125 INJECTION INTRAVENOUS
Status: COMPLETED | OUTPATIENT
Start: 2025-06-20 | End: 2025-06-20

## 2025-06-20 RX ORDER — EPINEPHRINE 0.3 MG/.3ML
0.3 INJECTION SUBCUTANEOUS ONCE AS NEEDED
OUTPATIENT
Start: 2025-06-27

## 2025-06-20 RX ORDER — SODIUM FERRIC GLUCONATE COMPLEX IN SUCROSE 12.5 MG/ML
125 INJECTION INTRAVENOUS
OUTPATIENT
Start: 2025-06-27

## 2025-06-20 RX ADMIN — METHYLPREDNISOLONE SODIUM SUCCINATE 80 MG: 40 INJECTION, POWDER, FOR SOLUTION INTRAMUSCULAR; INTRAVENOUS at 01:06

## 2025-06-20 RX ADMIN — SODIUM FERRIC GLUCONATE COMPLEX IN SUCROSE 125 MG: 12.5 INJECTION INTRAVENOUS at 02:06

## 2025-06-21 ENCOUNTER — CLINICAL SUPPORT (OUTPATIENT)
Dept: REHABILITATION | Facility: HOSPITAL | Age: 55
End: 2025-06-21
Attending: INTERNAL MEDICINE
Payer: MEDICARE

## 2025-06-21 DIAGNOSIS — M45.9 ANKYLOSING SPONDYLITIS, UNSPECIFIED SITE OF SPINE: ICD-10-CM

## 2025-06-21 DIAGNOSIS — M79.7 FIBROMYALGIA: Primary | Chronic | ICD-10-CM

## 2025-06-21 PROCEDURE — 97110 THERAPEUTIC EXERCISES: CPT

## 2025-06-21 PROCEDURE — 97162 PT EVAL MOD COMPLEX 30 MIN: CPT

## 2025-06-21 NOTE — TELEPHONE ENCOUNTER
Cardiac Clearance  Hold ASA 5 days          
Informed the pt Her insurance is not accepted with [facility]. She can either call her insurance and see if there is anyone in network or see if she can do cash pay if that is an option. Pt states that this is ok she will look into it.   
Name band;

## 2025-06-21 NOTE — PROGRESS NOTES
Outpatient Rehab    Physical Therapy Evaluation (only)    Patient Name: Yolanda Betts  MRN: [id number]  YOB: 1970  Encounter Date: 6/21/2025    Therapy Diagnosis:   Encounter Diagnoses   Name Primary?    Fibromyalgia Yes    Ankylosing spondylitis, unspecified site of spine      Physician: Chico Jackson,*    Physician Orders: Eval and Treat  Medical Diagnosis: Fibromyalgia  Ankylosing spondylitis, unspecified site of spine  Surgical Diagnosis: Not applicable for this Episode  Surgical Date: Not applicable for this Episode  Days Since Last Surgery: Not applicable for this Episode    Visit # / Visits Authorized:  1 / 1  Insurance Authorization Period: 6/12/2025 to 6/12/2026  Date of Evaluation: 6/21/2025  Plan of Care Certification: 6/21/2025 to 8/16/2025     Time In: 0835   Time Out: 0925  Total Time (in minutes): 50   Total Billable Time (in minutes): 50    Subjective   History of Present Illness    Yolanda is a 55 y.o. female who reports to physical therapy with a chief concern of Increasing pain - multiple sites . Worst in knees and spine.        History of Present Condition/Illness: Yolanda comes to PT today for evaluation on referral from Rheumatology. Referral was for aquatic therapy however the pool is currently out of order. She has a number of chronic conditions , including Ankylosing Spondylitis and Fibromyaglia with pain in multiple regions. She recently has had 2 falls and they have led to some increased knee pain. She has a neoprene brace on her right knee , along with lidocaine patches which she has been using for past 2 weeks. Reports some help with such. She reports that recently she has been having increased generalized aching , particularly bad across the upper thoracic region / periscapular as well as down spine to the low back. Pain is constant but varies in intensity.     Pain     Patient reports a current pain level of 8/10. Pain at best is reported as 8/10. Pain at  worst is reported as 10/10.   Location: Multiple site - worst in upper / mid TSp , knees currently  Clinical Progression (since onset): Stable  Pain Qualities: Aching, Dull  Pain-Relieving Factors: Change in position  Pain-Aggravating Factors: Sitting, Walking       Past Medical History/Physical Systems Review:   Yolanda Betts  has a past medical history of Abnormal Pap smear of cervix, Anemia, Anxiety, Arthritis, Asthma, Bipolar 1 disorder, Diabetes mellitus, type 2, Dyslipidemia associated with type 2 diabetes mellitus, Dyspnea due to COVID-19, General anesthetics causing adverse effect in therapeutic use, Genital warts, GERD (gastroesophageal reflux disease), Herpes simplex virus (HSV) infection, Hyperlipidemia, Hypertension, Hypertension complicating diabetes, Migraine with aura and without status migrainosus, not intractable, Mild persistent asthma without complication, Morbid obesity with body mass index (BMI) of 45.0 to 49.9 in adult, Myoclonus, Obstructive sleep apnea, ALEN (obstructive sleep apnea), Schizoaffective disorder, bipolar type, Seasonal allergic rhinitis due to pollen, Seizures, Type 2 diabetes mellitus with hyperglycemia, with long-term current use of insulin, and Type 2 diabetes mellitus with hyperglycemia, without long-term current use of insulin.    Yolanda Betts  has a past surgical history that includes  section; Mouth surgery (); Breast surgery (Bilateral, ); Tubal ligation; Colonoscopy; Belt abdominoplasty (); Colonoscopy (N/A, 2018); Colonoscopy (N/A, 2018); Oophorectomy; Hysterectomy; abcess removed right back; Robot-assisted cholecystectomy using da Sharonda Xi (N/A, 1/3/2020); Total Reduction Mammoplasty; Epidural steroid injection into cervical spine (N/A, 2023); Esophagogastroduodenoscopy (N/A, 3/10/2023); Colonoscopy (N/A, 3/10/2023); Carpal tunnel release (Bilateral, 2023); Injection of anesthetic agent around medial branch nerves  innervating cervical facet joint (Bilateral, 12/19/2023); Epidural steroid injection into cervical spine (N/A, 1/30/2024); Colonoscopy (N/A, 4/4/2024); and ANGIOGRAM, CORONARY, WITH LEFT HEART CATHETERIZATION (N/A, 9/27/2024).    Yolanda has a current medication list which includes the following prescription(s): albuterol, alprazolam, amlodipine, atorvastatin, bd insulin syringe ultra-fine, dexcom g6 sensor, dexcom g6 transmitter, clotrimazole-betamethasone 1-0.05%, diclofenac sodium, duloxetine, ergocalciferol, estradiol, eszopiclone, fenofibrate, fluticasone propionate, folic acid, furosemide, baqsimi, haloperidol, hydrocortisone, insulin aspart u-100, insulin aspart u-100, lantus solostar u-100 insulin, omnipod 5 g6-g7 pods (gen 5), insulin syringe-needle u-100, taltz autoinjector, lamotrigine, levetiracetam, levocetirizine, lidocaine, xiidra, linaclotide, magnesium oxide, melatonin, metoprolol succinate, omeprazole, ondansetron, ondansetron, ozempic, bd ultra-fine mini pen needle, polyethylene glycol, terconazole, [DISCONTINUED] clonazepam, [DISCONTINUED] glucagon emergency kit (human), and [DISCONTINUED] valsartan.    Review of patient's allergies indicates:   Allergen Reactions    Codeine Itching    Hydromorphone Other (See Comments)     Can't wake up for long time if taken    Slow to wake up after surgery after receiving    Aleve [naproxen sodium]      Increases BP    Ibuprofen      Increases BP    Neuromuscular blockers, steroidal Hives     some    Pregabalin Itching    Versed [midazolam]     Latex, natural rubber Rash    Morphine Rash     itching    Norco [hydrocodone-acetaminophen] Itching, Rash and Hallucinations    Secobarbital sodium Rash     itching    Tylox [oxycodone-acetaminophen] Rash        Objective   Posture  Patient presents with a Forward head position. Increased thoracic kyphosis and Increased lumbar lordosis is observed.   Shoulders are Rounded. Bilateral scapulae are: Protracted  "    Spinal Mobility  Hypomobile: Thoracic     Spinal Muscle Palpation  Right Spinal Muscle Palpation  Abnormal: Cervical/Thoracic and Lumbar/Sacral   Generalized tenderness throughout - particularly in upper traps , lumbar and thoracic paraspinals and periscapular region.     Left Spinal Muscle Palpation  Abnormal: Cervical/Thoracic and Lumbar/Sacral   Generalized tenderness throughout - particularly in upper traps , lumbar and thoracic paraspinals and periscapular region.     Hip Palpation  Right Hip Palpation  Abnormal: Lumbar/Sacral Muscle     Left Hip Palpation  Abnormal: Lumbar/Sacral Muscle     Range of Motion  Cervical ROM is WFL all directions     Lumbar Range of Motion   Active (deg)   Flexion  (fingertips to 6" above ankle)   Extension  (Full - significant hinge point mid LSp)   Right Lateral Flexion  (WNL)   Right Rotation  (WNL)   Left Lateral Flexion  (WNL)   Left Rotation  (WNL)     Upper Extremity Range of Motion   Shoulder, Elbow, or Forearm Range of Motion Details: Active shoulder elevation to 135 degrees but passively full. All upper extremity PROM is WFL    Lower Extremity Range of Motion    Lower extremity ROM is WFL . Hypermobility of knee extension bilaterally     Shoulder Strength - Planes of Motion   Right Strength Left Strength   Flexion 3+ 3+   ABduction 3+ 3+   Internal Rotation 0° 4 4   External Rotation 0° 3+ 3+     Lumbar/Pelvic Girdle Special Tests   Lumbar Tests - SLR and Tension  Negative: Right Passive Straight Leg Raise and Left Passive Straight Leg Raise        Time Entry(in minutes):  PT Evaluation (Moderate) Time Entry: 30  Therapeutic Exercise Time Entry: 20    Assessment & Plan   Assessment  Yolanda presents with a condition of Moderate complexity.   Will Comorbidities Impact Care: Yes  Yolanda has a number of comorbidities and conditions that will all impact her pain and ease of mobility. She reports today that her blood sugar levels have been fluctuating , depending on meds and " infusions and states that yesterday her levels exceeded 400. Her fibromyalgia and Ankylosing Spondylitis certainly will be a factor.      Functional Limitations: Activity tolerance, Decreased ambulation distance/endurance, Disrupted sleep pattern, Functional mobility, Gait limitations, Increased risk of fall, Performing household chores, Participating in leisure activities, Painful locomotion/ambulation, Pain with ADLs/IADLs, Sitting tolerance, Standing tolerance  Impairments: Abnormal gait, Activity intolerance, Impaired physical strength, Lack of appropriate home exercise program, Pain with functional activity    Patient Goal for Therapy (PT): Decreased pain to improve ease of ADLs  Prognosis: Fair  Assessment Details: Yolanda comes to PT with an order for aquatic PT , however as that is currently unavailable we discussed doing land based therapy. She presents with significant aching / pain in multiple regions, specifically the knees and spine.   She has good mobility through extremities , and even hypermobility with knee hyperextension and lumbar hyperextension as well. Certainly the aquatic environment would be a good medium for her to exercise in , however I discussed with her that general exercise and mobility in the gym would be beneficial as well , including exercises targeting general spine/ trunk stability and strengthening.     Plan  From a physical therapy perspective, the patient would benefit from: Skilled Rehab Services    Planned therapy interventions include: Therapeutic exercise, Therapeutic activities, Neuromuscular re-education, Manual therapy, and Aquatic therapy.    Planned modalities to include: Electrical stimulation - passive/unattended, Thermotherapy (hot pack), and Other (Comment). Dry needling       Visit Frequency: 2 times Per Week for 8 Weeks.       This plan was discussed with Patient.   Discussion participants: Agreed Upon Plan of Care     The patient's spiritual, cultural, and  educational needs were considered, and the patient is agreeable to the plan of care and goals.     Treatment Today      CPT Intervention Performed   Today Duration / Intensity     Mat Exercises        TE LTR  xx 2 mins hook ly and 2 min on T ball     TE DKTC on T Ball   xx 2 mins     TE Hip bilateral ER/Abd   xx 3 x 10    TE Hip March to band in hook ly  xx 3 x 10     TE  Ankle band -DF, PF  xx   2 x 10 each                FLOOR EXERCISES                    Manual Therapy       MT                PLAN               CPT Codes available for Billing:   (0) minutes of Manual therapy (MT) to improve pain and ROM.  (20) minutes of Therapeutic Exercise (TE) to develop strength, endurance, range of motion, and flexibility.  (0) minutes of Neuromuscular Re-Education (NMR)  to improve: Balance, Coordination, Kinesthetic, Sense, Proprioception, and Posture.  (00) minutes of Therapeutic Activities (TA) to improve functional performance.  Vasopneumatic Device Therapy () for management of swelling/edema. (54175)  Unattended Electrical Stimulation (ES) for muscle performance or pain modulation.  BFR: Blood flow restriction applied during exercise        Goals:   Active       Ambulation/movement       Patient will walk community distances with minimal to no pain       Start:  06/21/25    Expected End:  08/16/25               Changing body position       Patient will demonstrate moving sit to stand 10x with minimal pain and no upper extremity support       Start:  06/21/25    Expected End:  08/02/25               Functional outcome       Patient stated goal: Decreased pain to improve ease of ADLs        Start:  06/21/25    Expected End:  07/19/25            Patient will demonstrate independence in home program for support of progression       Start:  06/21/25    Expected End:  08/16/25               Lifting & carrying objects       Patient will lift 20# from floor to waist height to demonstrate improved function for household tasks        Start:  06/21/25    Expected End:  08/16/25               Pain       Patient will report a 2 point reduction in pain while performing household tasks       Start:  06/21/25    Expected End:  08/02/25               Range of Motion       Patient will achieve bilateral active shoulder flexion of 160 degrees        Start:  06/21/25    Expected End:  08/16/25               Strength       Patient will achieve bilateral shoulder abduction strength of 4+/5       Start:  06/21/25    Expected End:  08/16/25            Patient will achieve bilateral knee extension strength of 4+/5       Start:  06/21/25    Expected End:  08/16/25                Pelon Vizcaino PT

## 2025-06-23 ENCOUNTER — TELEPHONE (OUTPATIENT)
Dept: PAIN MEDICINE | Facility: CLINIC | Age: 55
End: 2025-06-23
Payer: MEDICARE

## 2025-06-23 NOTE — PROGRESS NOTES
Chronic Pain Established Note (Follow up visit)      Chronic generalized pain.        SUBJECTIVE:  Interval History (6/24/2025):  Patient Yolanda Betts presents today for follow-up visit.  Patient being seen for follow up of neck and lower back pain. She has had extensive medical workup since her last appt including subdural hematoma and seizures since November. Several medication changes.  She continues to have low back pain radiating down the back of the legs and neck pain into the bilateral arms. C-Dania without relief. C-mbb without relief.  She is still in PT currently with some relief.   States she was taken off tramadol for seizure precaution.  Patient denies night fever/night sweats, urinary incontinence, bowel incontinence, significant weight loss and significant motor weakness.   Patient denies any other complaints or concerns at this time.      Interval History (10/17/2024):  Patient Yolanda Betts presents today for follow-up visit.  Patient is being seen for follow up of chronic neck and back pain. She has neck pain that radiates to the shoulders. She has had several cervical procedures with minimal relief. She is interested in addressing her back. Back pain radiates down the back of the left leg to the calf. She has pain across the lower back and buttock with tenderness in the hips. Pain is worse with prolonged activity. She rates her pain 8/10.   She has been seeing pulmonary due to some SOB issues ans asthma flare ups as well as working on her blood sugars which have been in the 400s. No recent A1C. She does not feel, due to the fatigue and SOB, that she is able to attend formal physical therapy.   Patient denies night fever/night sweats, urinary incontinence, bowel incontinence, significant weight loss and significant motor weakness.   Patient denies any other complaints or concerns at this time.      Interval History (7/11/2024):    Yolanda Betts is a 55 y.o. female presents today  for follow-up chronic pain.  She continues to utilize Celebrex, Cymbalta, and lidocaine patches as prescribed.  She reports her pain is 8/10 and states that it is a generalized pain throughout her entire body.  She denies any new injuries or trauma in interim.  She also continues to utilize Cosentyx for her ankylosing spondylitis in his following up with rheumatology regularly.  She also has been taking Flexeril up to 2 times a day as needed for her myofascial pain with fair results.      Interval HPI 03/27/2024:  Patient Yolanda Betts presents today for follow-up visit.  Patient in for follow-up of cervical pain.  She has currently not having any pain radiating into the upper extremities but states at times her pain will radiate down her back.  She rates her pain today an 8/10.  She has been attending physical therapy for 4-5 weeks and is not getting any relief.  She has been attending occupational therapy for hand strength and does report this is getting better.  She has had cervical ROCAEL as well as medial branch block with minimal or short duration of relief in the past.  She is tried multiple medications for her symptoms.  She followed up with Rheumatology this week who DC her tizanidine and started her on Flexeril as well as a Medrol Dosepak for when she has an acute flare-up.    Interval History (2/26/2024):  Patient Yolanda Betts presents today for follow-up visit.  Since last appt she has had:  1/30/2024 C5/6 IL ROCAEL relief x 1 week, now at baseline.   12/19/2023 Yusef C5-7 MBB no relief.  She still complains of cervical pain which radiates into the bilateral shoulders.  She rates her pain today an 8/10.  She is tried multiple medications in the past with either side effects or no relief including Topamax, gabapentin, Lyrica.  She is currently taking Cymbalta and tizanidine.  She has done physical therapy in the past which has worked really well for her and she is interested in resuming physical  therapy program.  No new weakness into the arms or changes in neurological symptoms.    Interval history 12/1/2023  Yolanda Betts presents to tele-medicine appointment for a follow-up appointment for neck pain. Since the last visit, Yolanda Betts states the pain has been worsening. Current pain intensity is 10/10.  She locates the majority of pain to the cervical myofascial region with radiation into her shoulder blade area.  She denies any radiation into the upper extremities.  Previous cervical ROCAEL provided 50% relief for only about 3 weeks.    Interval History (1/17/2023):  Yolanda Betts presents today for follow-up visit.  Patient was last seen on 05/09/2022. At that visit, the plan was to discontinue Savella and Flexeril, continue Cymbalta 60 mg once daily, and begin Tizanidine. She reports she had a slip and fall on Sunday in her tub. Denies any dizziness or LOC before fall, just slipped. Able to get up. Sore all over since then. Patient reports pain as 10/10 today. MRI of cervical spine ordered by Dr. Kirkpatrick, who thinks shoulder pain is stemming more from neck. No injections in shoulder. Pain begins in neck and radiates into both arms and down spine. Reports nothing helps her pain. Requesting referral for medicinal marijuana as she heard that this may be beneficial.        Interval HPI 05/09/2022  Yolanda Betts is a 52 y.o. female presents today for tele medicine follow-up.  She was recently seen on 03/07/2022.  At that visit, she was provided with a Medrol Dosepak and advised to continue with topical analgesics along with Savella, Cymbalta, and Flexeril p.r.n..  She reports less than 10% relief with this current medication regimen and is having poor sleep.     Interval History (3/7/2022):    Yolanda Betts presents today for follow-up visit.  Patient was last seen about 6 months ago.  She doesn't feel pain is controlled currently, but she does not feel like she can go up on the  Savella because she is concerned about drowsiness.  She continues to take the Flexeril twice a day.  She alternates with Voltaren gel and lidocaine cream, which helps somewhat.  She continues to follow-up with orthopedics and had a right shoulder injection on 02/07/2022 with some pain relief.  Patient reports pain as 10/10 today.     Interval History (9/17/2021):  Yolanda Betts presents today on telemedicine visit.  Patient was last seen on 03/23/2021 with Dr. Hay. At that visit, she was on Cymbalta 60 mg twice a day, which she is now only taking once a day.  She is also taking Savella, and she inquires about an increase.  Patient reports pain as 8/10 today.     History of Present Illness:   This patient is a 50 y.o. female who presents today complaining of the above noted pain/s. The patient describes the pain as follows.  Ms. Betts his new patient clinic with complaints of bilateral shoulders, bilateral hips, thoracic and lumbar spine pain.  She has a history of fibromyalgia reports that this pain feels different from the fibromyalgia.  She has been diagnosed with fibromyalgiafor approximately 10 years has been having these joint complaints for approximately last 5 years.  She describes a sharp throbbing sensation which is worse with movement and she has been unable find anything that provides pain relief.  She has tried numerous different medications including Aleve, Tylenol, Robaxin, tizanidine, Advil, gabapentin, Flexeril, Lyrica, Cymbalta, baclofen in addition to currently participating in physical therapy.  She has had shoulder joint injections in the past which helped for short period of time but denies having had hip injections.  She denies having had surgery on any of these joints.  She reports that anti-inflammatory medications cause her blood pressure to increase therefore she has to avoid them.     Yolanda Betts is a 52 y.o. female  who is presenting with a chief complaint of lumbar back  pain . The patient began experiencing this problem insidiously, and the pain has been gradually worsening over the past 5 year(s). The pain is described as throbbing, cramping, aching and heavy and is located in the bilateral lumbar spine . Pain is intermittent and lasts hours. The  pain is nonradiating. The patient rates her pain a 7 out of ten and interferes with activities of daily living a 6 out of ten. Pain is exacerbated by flexion/ extension of the lumbar spine, and is improved by rest. Patient reports prior trauma, no prior spinal surgery      - pertinent negatives: No fever, No chills, No weight loss, No bladder dysfunction, No bowel dysfunction, No saddle anesthesia  - pertinent positives: none    - medications, other therapies tried (physical therapy, injections):     >> NSAIDs, Tylenol, Tramadol, gabapentin, lyrica, zanaflex and flexeril    >> Has previously undergone Physical Therapy      And procedures:   -C6-7 IL ROCAEL on 01/31/2023, 50% relief for 3 weeks   1/30/2024 C5/6 IL ROCAEL relief x 1 week, now at baseline.   12/19/2023 Yusef C5-7 MBB no relief.     Imaging / Labs / Studies (reviewed on 06/24/2025):     MRI cervical spine 01/03/2023  FINDINGS:  The vertebral body heights and alignment are maintained throughout the cervical spine.     No abnormal vertebral body marrow signal.     Multilevel intervertebral disc desiccation.     No abnormal signal at the cervicomedullary junction or cervical spinal cord.     C2-3: Normal intervertebral disc contour. No central canal or neural foraminal narrowing.     C3-4: Normal intervertebral disc contour.  Bilateral uncovertebral and facet hypertrophy.  No central canal or neural foraminal narrowing.     C4-5: Intervertebral disc bulge.  Bilateral uncovertebral and facet hypertrophy.  No central canal narrowing.  Mild bilateral neural foraminal narrowing.     C5-6: Intervertebral disc bulge.  Bilateral uncovertebral and facet hypertrophy.  Minimal bilateral neural  foraminal narrowing.  No central canal narrowing.     C6-7: Intervertebral disc bulge.  No central canal or neural foraminal narrowing.     C7-T1: Normal intervertebral disc contour. No central canal or neural foraminal narrowing.     The paravertebral soft tissues are unremarkable.     Impression:     At C4-5, there is mild intervertebral disc bulge with uncovertebral and facet hypertrophy mildly narrowing the bilateral neural foramina.     At C5-6, there is an intervertebral disc bulge with uncovertebral and facet hypertrophy minimally narrowing the bilateral neural foramina.      11/13/2020 X-Ray Lumbar Spine Ap And Lateral  COMPARISON:  Lumbar spine radiographs August 18, 2020     FINDINGS:  Alignment of the lumbar spine is normal without listhesis.  No fracture.  No definite pars defect.  No suspicious osseous lesion.  Intervertebral disc spaces of the lumbar spine are maintained.  Inferior lumbar spine facet arthropathy is noted.  Sacroiliac joints are unremarkable.  Multiple phleboliths are present within the pelvis.        1/14/21 X-Ray Cervical Spine Complete 5 view  COMPARISON:  August 18, 2020     FINDINGS:  Visualization of C7-T1 level on the lateral view.  Vertebral height and alignment maintained with straightening of the normal curvature.  This can be seen with positioning as well as spasm and/or strain.  Prevertebral soft tissues, C1-2 articulation and odontoid normal in appearance allowing for positioning.  Pre dens space normal.  Neural foramina at widely patent at all levels bilaterally.     Remaining included osseous structures intact.  Visualized upper lung fields clear.     Impression  Straightening of normal C-spine curvature.  This can be seen with positioning as well as spasm and/or strain.     No acute fracture or subluxation.  Follow-up and or further evaluation as warranted.       PMHx,PSHx, Social history, and Family history:  I have reviewed the patient's medical, surgical, social, and  "family history in detail and updated the computerized patient record.    Review of patient's allergies indicates:   Allergen Reactions    Codeine Itching    Hydromorphone Other (See Comments)     Can't wake up for long time if taken    Slow to wake up after surgery after receiving    Aleve [naproxen sodium]      Increases BP    Ibuprofen      Increases BP    Neuromuscular blockers, steroidal Hives     some    Pregabalin Itching    Versed [midazolam]     Latex, natural rubber Rash    Morphine Rash     itching    Norco [hydrocodone-acetaminophen] Itching, Rash and Hallucinations    Secobarbital sodium Rash     itching    Tylox [oxycodone-acetaminophen] Rash       Current Outpatient Medications   Medication Sig    albuterol (PROVENTIL) 2.5 mg /3 mL (0.083 %) nebulizer solution Take 2.5 mg by nebulization every 4 (four) hours as needed. Times a day    ALPRAZolam (XANAX) 1 MG tablet Take 1 tablet (1 mg total) by mouth daily as needed for Anxiety.    amLODIPine (NORVASC) 10 MG tablet Take 1 tablet (10 mg total) by mouth once daily.    atorvastatin (LIPITOR) 20 MG tablet Take 1 tablet (20 mg total) by mouth every evening    BD INSULIN SYRINGE ULTRA-FINE 1/2 mL 30 gauge x 1/2" Syrg     blood-glucose sensor (DEXCOM G6 SENSOR) Mariela change sensor every 10 days.    blood-glucose transmitter (DEXCOM G6 TRANSMITTER) Mariela 1 each by Misc.(Non-Drug; Combo Route) route every 3 (three) months.    clotrimazole-betamethasone 1-0.05% (LOTRISONE) cream Apply topically 2 (two) times daily.    diclofenac sodium (VOLTAREN) 1 % Gel Apply 2 g topically 3 (three) times daily as needed (pain)    DULoxetine (CYMBALTA) 60 MG capsule Take 1 capsule (60 mg total) by mouth 2 (two) times daily.    ergocalciferol (ERGOCALCIFEROL) 50,000 unit Cap Take 1 capsule (50,000 Units total) by mouth every 7 days.    estradioL (ESTRACE) 0.01 % (0.1 mg/gram) vaginal cream Place 1 g vaginally twice a week.    eszopiclone (LUNESTA) 1 MG Tab Take 1 tablet (1 mg " "total) by mouth at bedtime as needed    fenofibrate (TRICOR) 145 MG tablet Take 1 tablet by mouth in the morning.    fluticasone propionate (FLONASE) 50 mcg/actuation nasal spray Take 1 spray by Each Nostril route in the morning.    folic acid (FOLVITE) 1 MG tablet Take 1 tablet by mouth every day in the morning    furosemide (LASIX) 20 MG tablet Take 1 tablet (20 mg total) by mouth once daily.    glucagon (BAQSIMI) 3 mg/actuation [place] SPRAY 1 SPRAY IN NOSTRIL AS NEEDED FOR EMERGENCY. MAY REPEAT 1 TIME AFTER 15 MINUTES    haloperidoL (HALDOL) 2 MG tablet Take 1 tablet (2 mg total) by mouth 2 (two) times daily.    hydrocortisone (ANUSOL-HC) 2.5 % rectal cream Place rectally once daily for 10 days    insulin aspart U-100 (NOVOLOG FLEXPEN U-100 INSULIN) 100 unit/mL (3 mL) InPn pen Inject 9 Units into the skin 3 (three) times daily before meals. Use sliding scale for small medium and large meals    insulin aspart U-100 (NOVOLOG U-100 INSULIN ASPART) 100 unit/mL injection To use via insulin pump. Up to 200 units every 2 days    insulin glargine U-100, Lantus, (LANTUS SOLOSTAR U-100 INSULIN) 100 unit/mL (3 mL) InPn pen Inject 16 Units into the skin once daily. Use in the event of pump failure    insulin pump cart,auto,BT,G6/7 (OMNIPOD 5 G6-G7 PODS, GEN 5,) Crtg 1 each by Misc.(Non-Drug; Combo Route) route every 48 hours.    insulin syringe-needle U-100 0.3 mL 30 gauge x 5/16" Syrg 1 each by Misc.(Non-Drug; Combo Route) route Every 3 (three) days.    ixekizumab (TALTZ AUTOINJECTOR) 80 mg/mL AtIn Inject 80 mg into the skin every 28 days.    lamoTRIgine (LAMICTAL) 100 MG tablet Take 1 tablet (100 mg total) by mouth 2 (two) times daily.    levETIRAcetam (KEPPRA) 750 MG Tab Take 1 tablet by mouth in the morning and 1 tablet before bedtime    levocetirizine (XYZAL) 5 MG tablet Take 1 tablet (5 mg total) by mouth every evening.    LIDOcaine (LIDODERM) 5 % Place 2 patches onto the skin every 12 (twelve) hours as needed (pain). " "Remove & Discard patch within 12 hours or as directed by MD    lifitegrast (XIIDRA) 5 % Dpet Place 1 drop into both eyes 2 (two) times daily.    linaCLOtide (LINZESS) 145 mcg Cap capsule Take 1 capsule (145 mcg total) by mouth daily as needed (for constipation).    magnesium oxide (MAG-OX) 400 mg (241.3 mg magnesium) tablet Take 1 tablet (400 mg total) by mouth once daily.    melatonin (MELATIN) 3 mg tablet Take 2 tablets (6 mg total) by mouth nightly.    metoprolol succinate (TOPROL-XL) 50 MG 24 hr tablet Take 1 tablet (50 mg total) by mouth every morning.    omeprazole (PRILOSEC) 40 MG capsule Take 1 capsule (40 mg total) by mouth once daily.    ondansetron (ZOFRAN) 4 MG tablet Take 4 mg by mouth every 6 (six) hours.    ondansetron (ZOFRAN) 4 MG tablet Take 1 tablet (4 mg total) by mouth every 6 (six) hours.    OZEMPIC 2 mg/dose (8 mg/3 mL) PnIj Inject 2 mg into the skin every 7 days.    pen needle, diabetic (BD ULTRA-FINE MINI PEN NEEDLE) 31 gauge x 3/16" Ndle Use 1 a day in event of pump failure    polyethylene glycol (GLYCOLAX) 17 gram/dose powder Take 17 g by mouth 2 (two) times daily.    terconazole (TERAZOL 7) 0.4 % Crea Place 1 applicator vaginally once daily.     No current facility-administered medications for this visit.       REVIEW OF SYSTEMS:    GENERAL:  No weight loss, malaise or fevers.  HEENT:   No recent changes in vision or hearing  NECK:  Negative for lumps, no difficulty with swallowing.  RESPIRATORY:  Negative for cough, wheezing or shortness of breath, patient denies any recent URI.  CARDIOVASCULAR:  Negative for chest pain, leg swelling or palpitations.  GI:  Negative for abdominal discomfort, blood in stools or black stools or change in bowel habits.  MUSCULOSKELETAL:  See HPI.  SKIN:  Negative for lesions, rash, and itching.  PSYCH:  No mood disorder or recent psychosocial stressors.  Patients sleep is not disturbed secondary to pain.  HEMATOLOGY/LYMPHOLOGY:  Negative for prolonged " "bleeding, bruising easily or swollen nodes.  Patient is not currently taking any anti-coagulants  NEURO:   No history of headaches, syncope, paralysis, seizures or tremors.  All other reviewed and negative other than HPI.    OBJECTIVE:  /80   Pulse 90   Ht 4' 8" (1.422 m)   Wt 92.6 kg (204 lb 2.3 oz)   BMI 45.77 kg/m²      Physical Exam:   General: Alert and oriented, in no apparent distress.  Gait: normal gait.  Skin: No rashes, No discoloration, No obvious lesions  HEENT: Normocephalic, atraumatic. Pupils equal and round.  Cardiovascular: Regular rate and rhythm , no significant peripheral edema present  Respiratory: Without audible wheezing, without use of accessory muscles of respiration.     Musculoskeletal:     Cervical Spine     - Pain on flexion of cervical spine Present  - Spurling's Test:  equivocal bilaterally     - Pain on extension of cervical spine Present  - TTP over the cervical facet joints Present  - Cervical facet loading Present     -TTP over trapezius bilaterally with allodynia, grabbed practitioners hands during exam  -TTP over rhomboid bilaterally with allodynia        Lumbar Spine     - Pain on flexion of lumbar spine Absent  - Straight Leg Raise:  positive bilaterally     - Pain on extension of lumbar spine Present  - TTP over the lumbar facet joints Present  - Lumbar facet loading Present   SIJ testing:  - TTP over SI joint: Present bilaterally  - Clare's/ Hank's: Positive bilaterally  - Sacroiliac Distraction Test (anterior pressure): Positive  - Sacroiliac Compression Test (lateral pressure): Positive   - SacralThrust Test (posterior pressure): Positive  Tender over GTB- positive bilaterally        Neuro:     Strength:  UE R/L: D: 5/5; B: 5/5; T: 5/5; WF: 5/5; WE: 5/5; IO: 5/5;  LE R/L: HF: 5/5, HE: 5/5, KF: 5/5; KE: 5/5; FE: 5/5; FF: 5/5     Extremity Reflexes: Brisk and symmetric throughout.      Extremity Sensory: Sensation to pinprick and temperature symmetric. " Proprioception intact.      Psych:  Mood and affect is appropriate       LABS:  Lab Results   Component Value Date    WBC 9.46 06/13/2025    HGB 10.6 (L) 06/13/2025    HCT 36.0 (L) 06/13/2025    MCV 79 (L) 06/13/2025     06/13/2025       CMP  Sodium   Date Value Ref Range Status   06/13/2025 135 (L) 136 - 145 mmol/L Final   03/16/2025 130 (L) 136 - 145 mmol/L Final     Potassium   Date Value Ref Range Status   06/13/2025 4.0 3.5 - 5.1 mmol/L Final   03/16/2025 5.0 3.5 - 5.1 mmol/L Final     Chloride   Date Value Ref Range Status   06/13/2025 102 95 - 110 mmol/L Final   03/16/2025 101 95 - 110 mmol/L Final     CO2   Date Value Ref Range Status   06/13/2025 21 (L) 23 - 29 mmol/L Final   03/16/2025 19 (L) 23 - 29 mmol/L Final     Glucose   Date Value Ref Range Status   06/13/2025 136 (H) 70 - 110 mg/dL Final   03/16/2025 442 (H) 70 - 110 mg/dL Final     BUN   Date Value Ref Range Status   06/13/2025 13 6 - 20 mg/dL Final     Creatinine   Date Value Ref Range Status   06/13/2025 0.7 0.5 - 1.4 mg/dL Final     Calcium   Date Value Ref Range Status   06/13/2025 9.8 8.7 - 10.5 mg/dL Final   03/16/2025 9.0 8.7 - 10.5 mg/dL Final     Protein Total   Date Value Ref Range Status   06/13/2025 7.6 6.0 - 8.4 gm/dL Final     Total Protein   Date Value Ref Range Status   03/16/2025 6.7 6.0 - 8.4 g/dL Final     Albumin   Date Value Ref Range Status   06/13/2025 3.6 3.5 - 5.2 g/dL Final   03/16/2025 3.4 (L) 3.5 - 5.2 g/dL Final     Total Bilirubin   Date Value Ref Range Status   03/16/2025 0.1 0.1 - 1.0 mg/dL Final     Comment:     For infants and newborns, interpretation of results should be based  on gestational age, weight and in agreement with clinical  observations.    Premature Infant recommended reference ranges:  Up to 24 hours.............<8.0 mg/dL  Up to 48 hours............<12.0 mg/dL  3-5 days..................<15.0 mg/dL  6-29 days.................<15.0 mg/dL       Bilirubin Total   Date Value Ref Range Status    06/13/2025 0.2 0.1 - 1.0 mg/dL Final     Comment:     For infants and newborns, interpretation of results should be based   on gestational age, weight and in agreement with clinical   observations.    Premature Infant recommended reference ranges:   0-24 hours:  <8.0 mg/dL   24-48 hours: <12.0 mg/dL   3-5 days:    <15.0 mg/dL   6-29 days:   <15.0 mg/dL     Alkaline Phosphatase   Date Value Ref Range Status   03/16/2025 116 40 - 150 U/L Final     ALP   Date Value Ref Range Status   06/13/2025 109 40 - 150 unit/L Final     AST   Date Value Ref Range Status   06/13/2025 18 11 - 45 unit/L Final   03/16/2025 14 10 - 40 U/L Final     ALT   Date Value Ref Range Status   06/13/2025 25 10 - 44 unit/L Final   03/16/2025 24 10 - 44 U/L Final     Anion Gap   Date Value Ref Range Status   06/13/2025 12 8 - 16 mmol/L Final     eGFR if    Date Value Ref Range Status   03/08/2022 >60.0 >60 mL/min/1.73 m^2 Final     eGFR if non    Date Value Ref Range Status   03/08/2022 >60.0 >60 mL/min/1.73 m^2 Final     Comment:     Calculation used to obtain the estimated glomerular filtration  rate (eGFR) is the CKD-EPI equation.          Lab Results   Component Value Date    HGBA1C 9.0 (H) 03/18/2025             ASSESSMENT: 55 y.o. year old female with neck pain, consistent with     1. Dorsalgia, unspecified  MRI Lumbar Spine Without Contrast      2. Lumbar radiculopathy  MRI Lumbar Spine Without Contrast    Ambulatory referral/consult to Pain Clinic      3. Cervical radiculopathy  Ambulatory referral/consult to Pain Clinic      4. Cervical spondylosis        5. Chronic myofascial pain        6. Fibromyalgia                        PLAN:   - Interventional:  None at this time, consider lumbar ROCAEL vs bilateral SIJ + bilateral GTB injection. Will get lumbar MRI    Medications:  -Continue  Cymbalta 60 mg b.i.d. per rheumatology  -continue Taltz per rheumatology   -we will refill for Lidoderm patches to apply to  painful areas as needed Q 12 p.r.n.  -rheumatology has her on flexeril  - Patient has failed Gabapentin, Lyrica, topamax.    -Hospital Dced celebrex    -Referral previously sent to Dr. Peoln Drew for Medicinal Marijuana     - Rehabilitative: Encouraged regular exercise. Continue exercises and activities as tolerated.      - Diagnostic: Cervical CT and MRI reviewed  Will get lumbar MRI to evaluate lumbar radiculopathy- pt currently states she is unable to do formal PT due to pulmonary issues with SOB. She has been doing at home physician guided exercises for the past 12 weeks without improvement in her lower back pain.     - Follow up:  after MRI- virtual- extended     - This condition does not require this patient to take time off of work, and the primary goal of our Pain Management services is to improve the patient's functional capacity.      - I discussed the risks, benefits, and alternatives to potential treatment options. All questions and concerns were fully addressed today in clinic.     The above plan and management options were discussed at length with patient. Patient is in agreement with the above and verbalized understanding.    Visit today included increased complexity associated with the care of the episodic problem of chronic pain which was addressed and continue to manage the longitudinal care of the patient due to the serious and/or complex managed problem(s) listed above.      Kathy Galan NP  Interventional Pain Management  [facility]    Disclaimer:  This note was prepared using voice recognition system and is likely to have sound alike errors that may have been overlooked even after proof reading.  Please call me with any questions

## 2025-06-24 ENCOUNTER — TELEPHONE (OUTPATIENT)
Dept: PAIN MEDICINE | Facility: CLINIC | Age: 55
End: 2025-06-24
Payer: MEDICARE

## 2025-06-24 ENCOUNTER — TELEPHONE (OUTPATIENT)
Dept: UROLOGY | Facility: CLINIC | Age: 55
End: 2025-06-24
Payer: MEDICARE

## 2025-06-24 ENCOUNTER — OFFICE VISIT (OUTPATIENT)
Dept: PAIN MEDICINE | Facility: CLINIC | Age: 55
End: 2025-06-24
Payer: MEDICARE

## 2025-06-24 VITALS
DIASTOLIC BLOOD PRESSURE: 80 MMHG | HEIGHT: 56 IN | BODY MASS INDEX: 45.92 KG/M2 | SYSTOLIC BLOOD PRESSURE: 126 MMHG | WEIGHT: 204.13 LBS | HEART RATE: 90 BPM

## 2025-06-24 DIAGNOSIS — M79.18 CHRONIC MYOFASCIAL PAIN: ICD-10-CM

## 2025-06-24 DIAGNOSIS — M54.9 DORSALGIA, UNSPECIFIED: Primary | ICD-10-CM

## 2025-06-24 DIAGNOSIS — M79.7 FIBROMYALGIA: ICD-10-CM

## 2025-06-24 DIAGNOSIS — M54.16 LUMBAR RADICULOPATHY: ICD-10-CM

## 2025-06-24 DIAGNOSIS — G89.29 CHRONIC MYOFASCIAL PAIN: ICD-10-CM

## 2025-06-24 DIAGNOSIS — M47.812 CERVICAL SPONDYLOSIS: ICD-10-CM

## 2025-06-24 DIAGNOSIS — M54.12 CERVICAL RADICULOPATHY: ICD-10-CM

## 2025-06-24 PROCEDURE — 3079F DIAST BP 80-89 MM HG: CPT | Mod: CPTII,S$GLB,, | Performed by: NURSE PRACTITIONER

## 2025-06-24 PROCEDURE — 3074F SYST BP LT 130 MM HG: CPT | Mod: CPTII,S$GLB,, | Performed by: NURSE PRACTITIONER

## 2025-06-24 PROCEDURE — 3052F HG A1C>EQUAL 8.0%<EQUAL 9.0%: CPT | Mod: CPTII,S$GLB,, | Performed by: NURSE PRACTITIONER

## 2025-06-24 PROCEDURE — 99999 PR PBB SHADOW E&M-EST. PATIENT-LVL V: CPT | Mod: PBBFAC,,, | Performed by: NURSE PRACTITIONER

## 2025-06-24 PROCEDURE — 3008F BODY MASS INDEX DOCD: CPT | Mod: CPTII,S$GLB,, | Performed by: NURSE PRACTITIONER

## 2025-06-24 PROCEDURE — 1159F MED LIST DOCD IN RCRD: CPT | Mod: CPTII,S$GLB,, | Performed by: NURSE PRACTITIONER

## 2025-06-24 PROCEDURE — 99213 OFFICE O/P EST LOW 20 MIN: CPT | Mod: S$GLB,,, | Performed by: NURSE PRACTITIONER

## 2025-06-24 NOTE — TELEPHONE ENCOUNTER
Pt stated she will be 20 mins late to her appt. Mrs Galan says she will see pt as long as she's here for 240 pm.    Pt understood.    Tracie LEI MA

## 2025-07-01 ENCOUNTER — TELEPHONE (OUTPATIENT)
Dept: PAIN MEDICINE | Facility: CLINIC | Age: 55
End: 2025-07-01
Payer: MEDICARE

## 2025-07-01 ENCOUNTER — OFFICE VISIT (OUTPATIENT)
Dept: PULMONOLOGY | Facility: CLINIC | Age: 55
End: 2025-07-01
Payer: MEDICARE

## 2025-07-01 VITALS — HEIGHT: 56 IN | WEIGHT: 200 LBS | BODY MASS INDEX: 44.99 KG/M2

## 2025-07-01 DIAGNOSIS — G47.33 OBSTRUCTIVE SLEEP APNEA: ICD-10-CM

## 2025-07-01 DIAGNOSIS — G47.33 OSA (OBSTRUCTIVE SLEEP APNEA): Primary | ICD-10-CM

## 2025-07-01 NOTE — PROGRESS NOTES
Subjective:      Patient ID: Yolanda Betts is a 55 y.o. female.    Chief Complaint: Sleep Apnea    The patient location is:  [place]  The chief complaint leading to consultation is: Sleep apnea     Visit type: audiovisual    Face to Face time with patient: 14  40 minutes of total time spent on the encounter, which includes face to face time and non-face to face time preparing to see the patient (eg, review of tests), Obtaining and/or reviewing separately obtained history, Documenting clinical information in the electronic or other health record, Independently interpreting results (not separately reported) and communicating results to the patient/family/caregiver, or Care coordination (not separately reported).     Each patient to whom he or she provides medical services by telemedicine is:  (1) informed of the relationship between the physician and patient and the respective role of any other health care provider with respect to management of the patient; and (2) notified that he or she may decline to receive medical services by telemedicine and may withdraw from such care at any time.    Notes:   07/01/2025   Yolanda Betts 55 y.o.   Here for ALEN follow-up.  CPAP set to 5-20 cm H2O; not currently using.  She reports she was admitted to neuropsych unit for medication-induced psychosis and treated with Haldol at [facility] in [place].  Brought CPAP to hospital, but discharged with incorrect device per patient.  Hospital insisted the device returned is hers  Currently unable to locate the device to confirm the serial number register to patient verify ownership  Recently moved out of daughter's home  Plans to contact staff or RT to recover correct machine.  Receiving iron infusions; pretreated with steroids.  Reports elevated blood glucose; on long-acting insulin and sliding scale.  Follow-up with diabetes management scheduled for tomorrow.  [place] [address]        Review of Systems    Psychiatric/Behavioral:  Positive for sleep disturbance.          Interval HPI History:      04/01/2025  Yolanda Betts 54 y.o.   New to me   Last seen on   Here for Spirometry & ALEN follow up     Te Manley MD Neurology- Plan for stay in EMU unit   Sleep apnea:  Reports using the CPAP device but wakes up with it off usually - does not recall removing device  Concern with mask fit, says device straps bunches together, leaks noted- wears re  Bedtime 8:30 pm  Wake time 6-7 am   Was sleep walking & eating -has stopped since by not buying the foods she would eat (cereal, sandwiches)  Says she has started the melatonin and topamax.  Ambien also  Recently started Ozempic again     Asthma:  Spirometry done today and reviewed  Occasional wheezing, cough    Symptoms per week:   Interfere w/ normal activity:  Nighttime awakening:  Rescue inhaler use freq:  Lung function (FEV1 >80%, FEV1/FEV):  Exacerbation this year, steroid Rx #:  Side Effects:  Patient Satisfaction w Asthma/Tx:  Symptom: Wheeze cough SOB chest tightness  Frequency:  Severity: Mild Moderate Severe  When: Night Day Outdoor Activity Exercise  ER Visits/Hospitalized   Allergies     Download today:  02/24/2025 - 03/25/2025  Usage days 24/30 days (80%)  >= 4 hours 7 days (23%)  < 4 hours 17 days (57%)  AHI= 5.8     Problem List[1]   Past Medical History:   Diagnosis Date    Abnormal Pap smear of cervix     HPV genital warts    Anemia     Anxiety     Arthritis     Asthma 10/11/2016    Bipolar 1 disorder     Diabetes mellitus, type 2     Dyslipidemia associated with type 2 diabetes mellitus 05/13/2019    Dyspnea due to COVID-19 09/12/2024    General anesthetics causing adverse effect in therapeutic use     Genital warts     GERD (gastroesophageal reflux disease)     Herpes simplex virus (HSV) infection     Hyperlipidemia     Hypertension     Hypertension complicating diabetes 05/05/2019    Migraine with aura and without status migrainosus, not  intractable 2016    Mild persistent asthma without complication 10/11/2016    Morbid obesity with body mass index (BMI) of 45.0 to 49.9 in adult 2017    Myoclonus     Obstructive sleep apnea     ALEN (obstructive sleep apnea)     2L per N/C q HS    Schizoaffective disorder, bipolar type 2019    Seasonal allergic rhinitis due to pollen 2019    Seizures     Type 2 diabetes mellitus with hyperglycemia, with long-term current use of insulin 2017    Type 2 diabetes mellitus with hyperglycemia, without long-term current use of insulin 2017     Past Surgical History:   Procedure Laterality Date    abcess removed right back      ANGIOGRAM, CORONARY, WITH LEFT HEART CATHETERIZATION N/A 2024    Procedure: Angiogram, Coronary, with Left Heart Cath;  Surgeon: Jaimie Wills MD;  Location: [facility] CATH LAB;  Service: Cardiology;  Laterality: N/A;    BELT ABDOMINOPLASTY      BREAST SURGERY Bilateral     Reduction    CARPAL TUNNEL RELEASE Bilateral 2023    Procedure: RELEASE, CARPAL TUNNEL;  Surgeon: Neville Roth MD;  Location: [facility] OR;  Service: Orthopedics;  Laterality: Bilateral;  bilateral carpal tunnel release     SECTION      X 2    COLONOSCOPY      COLONOSCOPY N/A 2018    Procedure: colonoscopy for iron deficiency anemia;  Surgeon: Frankie Sanchez MD;  Location: [facility];  Service: Endoscopy;  Laterality: N/A;    COLONOSCOPY N/A 2018    Procedure: COLONOSCOPY;  Surgeon: Frankie Sanchez MD;  Location: [facility];  Service: Endoscopy;  Laterality: N/A;    COLONOSCOPY N/A 3/10/2023    Procedure: COLONOSCOPY;  Surgeon: Lalita Montes De Oca MD;  Location: [facility];  Service: Endoscopy;  Laterality: N/A;    COLONOSCOPY N/A 2024    Procedure: COLONOSCOPY;  Surgeon: Tara Og MD;  Location: [facility];  Service: Endoscopy;  Laterality: N/A;    EPIDURAL STEROID INJECTION INTO CERVICAL SPINE N/A 2023    Procedure: C6/7 IL ROCAEL RN  IV Sedation;  Surgeon: Otto Phillips MD;  Location: [facility] PAIN MGT;  Service: Pain Management;  Laterality: N/A;    EPIDURAL STEROID INJECTION INTO CERVICAL SPINE N/A 1/30/2024    Procedure: C5/6 IL ROCAEL;  Surgeon: Otto Phillips MD;  Location: [facility] PAIN MGT;  Service: Pain Management;  Laterality: N/A;    ESOPHAGOGASTRODUODENOSCOPY N/A 3/10/2023    Procedure: EGD (ESOPHAGOGASTRODUODENOSCOPY);  Surgeon: Lalita Montes De Oca MD;  Location: [facility] ENDO;  Service: Endoscopy;  Laterality: N/A;    HYSTERECTOMY      w/ BSO; hypermenorrhea    INJECTION OF ANESTHETIC AGENT AROUND MEDIAL BRANCH NERVES INNERVATING CERVICAL FACET JOINT Bilateral 12/19/2023    Procedure: Bilateral C5-7 MBB (diagnostic);  Surgeon: Otto Phillips MD;  Location: [facility] PAIN MGT;  Service: Pain Management;  Laterality: Bilateral;    MOUTH SURGERY  1996    OOPHORECTOMY      hyst/bso, hypermenorrhea    ROBOT-ASSISTED CHOLECYSTECTOMY USING DA DARI XI N/A 1/3/2020    Procedure: XI ROBOTIC CHOLECYSTECTOMY;  Surgeon: Damir Driscoll MD;  Location: [facility] OR;  Service: General;  Laterality: N/A;    TOTAL REDUCTION MAMMOPLASTY      TUBAL LIGATION        Review of patient's allergies indicates:   Allergen Reactions    Codeine Itching    Hydromorphone Other (See Comments)     Can't wake up for long time if taken    Slow to wake up after surgery after receiving    Aleve [naproxen sodium]      Increases BP    Ibuprofen      Increases BP    Neuromuscular blockers, steroidal Hives     some    Pregabalin Itching    Versed [midazolam]     Latex, natural rubber Rash    Morphine Rash     itching    Norco [hydrocodone-acetaminophen] Itching, Rash and Hallucinations    Secobarbital sodium Rash     itching    Tylox [oxycodone-acetaminophen] Rash      Tobacco Use: Low Risk  (7/1/2025)    Patient History     Smoking Tobacco Use: Never     Smokeless Tobacco Use: Never     Passive Exposure: Never      Current Outpatient Medications   Medication Sig    albuterol  "(PROVENTIL) 2.5 mg /3 mL (0.083 %) nebulizer solution Take 2.5 mg by nebulization every 4 (four) hours as needed. Times a day    ALPRAZolam (XANAX) 1 MG tablet Take 1 tablet (1 mg total) by mouth daily as needed for Anxiety.    amLODIPine (NORVASC) 10 MG tablet Take 1 tablet (10 mg total) by mouth once daily.    atorvastatin (LIPITOR) 20 MG tablet Take 1 tablet (20 mg total) by mouth every evening    BD INSULIN SYRINGE ULTRA-FINE 1/2 mL 30 gauge x 1/2" Syrg     blood-glucose sensor (DEXCOM G6 SENSOR) Mariela change sensor every 10 days.    blood-glucose transmitter (DEXCOM G6 TRANSMITTER) Mariela 1 each by Misc.(Non-Drug; Combo Route) route every 3 (three) months.    clotrimazole-betamethasone 1-0.05% (LOTRISONE) cream Apply topically 2 (two) times daily.    diclofenac sodium (VOLTAREN) 1 % Gel Apply 2 g topically 3 (three) times daily as needed (pain)    DULoxetine (CYMBALTA) 60 MG capsule Take 1 capsule (60 mg total) by mouth 2 (two) times daily.    ergocalciferol (ERGOCALCIFEROL) 50,000 unit Cap Take 1 capsule (50,000 Units total) by mouth every 7 days.    estradioL (ESTRACE) 0.01 % (0.1 mg/gram) vaginal cream Place 1 g vaginally twice a week.    eszopiclone (LUNESTA) 1 MG Tab Take 1 tablet (1 mg total) by mouth at bedtime as needed    fenofibrate (TRICOR) 145 MG tablet Take 1 tablet by mouth in the morning.    fluticasone propionate (FLONASE) 50 mcg/actuation nasal spray Take 1 spray by Each Nostril route in the morning.    folic acid (FOLVITE) 1 MG tablet Take 1 tablet by mouth every day in the morning    furosemide (LASIX) 20 MG tablet Take 1 tablet (20 mg total) by mouth once daily.    glucagon (BAQSIMI) 3 mg/actuation [place] SPRAY 1 SPRAY IN NOSTRIL AS NEEDED FOR EMERGENCY. MAY REPEAT 1 TIME AFTER 15 MINUTES    haloperidoL (HALDOL) 2 MG tablet Take 1 tablet (2 mg total) by mouth 2 (two) times daily.    hydrocortisone (ANUSOL-HC) 2.5 % rectal cream Place rectally once daily for 10 days    insulin aspart U-100 " "(NOVOLOG FLEXPEN U-100 INSULIN) 100 unit/mL (3 mL) InPn pen Inject 9 Units into the skin 3 (three) times daily before meals. Use sliding scale for small medium and large meals    insulin aspart U-100 (NOVOLOG U-100 INSULIN ASPART) 100 unit/mL injection To use via insulin pump. Up to 200 units every 2 days    insulin glargine U-100, Lantus, (LANTUS SOLOSTAR U-100 INSULIN) 100 unit/mL (3 mL) InPn pen Inject 16 Units into the skin once daily. Use in the event of pump failure    insulin pump cart,auto,BT,G6/7 (OMNIPOD 5 G6-G7 PODS, GEN 5,) Crtg 1 each by Misc.(Non-Drug; Combo Route) route every 48 hours.    insulin syringe-needle U-100 0.3 mL 30 gauge x 5/16" Syrg 1 each by Misc.(Non-Drug; Combo Route) route Every 3 (three) days.    ixekizumab (TALTZ AUTOINJECTOR) 80 mg/mL AtIn Inject 80 mg into the skin every 28 days.    lamoTRIgine (LAMICTAL) 100 MG tablet Take 1 tablet (100 mg total) by mouth 2 (two) times daily.    levETIRAcetam (KEPPRA) 750 MG Tab Take 1 tablet by mouth in the morning and 1 tablet before bedtime    levocetirizine (XYZAL) 5 MG tablet Take 1 tablet (5 mg total) by mouth every evening.    LIDOcaine (LIDODERM) 5 % Place 2 patches onto the skin every 12 (twelve) hours as needed (pain). Remove & Discard patch within 12 hours or as directed by MD    lifitegrast (XIIDRA) 5 % Dpet Place 1 drop into both eyes 2 (two) times daily.    linaCLOtide (LINZESS) 145 mcg Cap capsule Take 1 capsule (145 mcg total) by mouth daily as needed (for constipation).    magnesium oxide (MAG-OX) 400 mg (241.3 mg magnesium) tablet Take 1 tablet (400 mg total) by mouth once daily.    melatonin (MELATIN) 3 mg tablet Take 2 tablets (6 mg total) by mouth nightly.    metoprolol succinate (TOPROL-XL) 50 MG 24 hr tablet Take 1 tablet (50 mg total) by mouth every morning.    omeprazole (PRILOSEC) 40 MG capsule Take 1 capsule (40 mg total) by mouth once daily.    ondansetron (ZOFRAN) 4 MG tablet Take 4 mg by mouth every 6 (six) hours.    " "ondansetron (ZOFRAN) 4 MG tablet Take 1 tablet (4 mg total) by mouth every 6 (six) hours.    OZEMPIC 2 mg/dose (8 mg/3 mL) PnIj Inject 2 mg into the skin every 7 days.    pen needle, diabetic (BD ULTRA-FINE MINI PEN NEEDLE) 31 gauge x 3/16" Ndle Use 1 a day in event of pump failure    polyethylene glycol (GLYCOLAX) 17 gram/dose powder Take 17 g by mouth 2 (two) times daily.    terconazole (TERAZOL 7) 0.4 % Crea Place 1 applicator vaginally once daily.   Last reviewed on 7/1/2025 12:19 PM by Can Henson LPN      Objective:   There were no vitals filed for this visit.   Last 3 sets of Vitals        6/20/2025     1:14 PM 6/24/2025     2:32 PM 7/1/2025    12:17 PM   Vitals - 1 value per visit   SYSTOLIC 126 126    DIASTOLIC 78 80    Pulse 87 90    Temp 97 °F (36.1 °C)     Resp 18     SPO2 98 %     Weight (lb)  204.15 200   Weight (kg)  92.6 90.719   Height  4' 8" (1.422 m) 4' 8" (1.422 m)   BMI (Calculated)  45.8 44.9   Pain Score Ten Ten       Body mass index is 44.84 kg/m².   Wt Readings from Last 3 Encounters:   07/01/25 90.7 kg (200 lb)   06/24/25 92.6 kg (204 lb 2.3 oz)   06/18/25 90.7 kg (200 lb)        Physical Exam        7/1/2025    12:17 PM 6/24/2025     2:32 PM 6/20/2025     2:51 PM 6/20/2025     1:14 PM 6/18/2025     8:59 AM 6/16/2025    10:02 AM 6/14/2025     2:30 AM   Pulmonary Function Tests   SpO2   98 % 98 %  98 % 98 %   Height 4' 8" (1.422 m) 4' 8" (1.422 m)   4' 8" (1.422 m)     Weight 90.7 kg (200 lb) 92.6 kg (204 lb 2.3 oz)   90.7 kg (200 lb) 91.1 kg (200 lb 13.4 oz)    BMI (Calculated) 44.9 45.8   44.9         Lab Results   Component Value Date    PH 7.346 (L) 10/12/2024    PCO2 55.7 (H) 10/12/2024    PO2 26 (LL) 10/12/2024    HCO3 30.5 (H) 10/12/2024    BE 5 (H) 10/12/2024    POCSATURATED 42 10/12/2024    FIO2 21 09/12/2024     Lab Results   Component Value Date    ECHOEF 75 11/30/2024     Lab Results   Component Value Date    WBC 9.46 06/13/2025    RBC 4.55 06/13/2025    HGB 10.6 (L) " 06/13/2025    HCT 36.0 (L) 06/13/2025    MCV 79 (L) 06/13/2025    MCH 23.3 (L) 06/13/2025    MCHC 29.4 (L) 06/13/2025    RDW 19.3 (H) 06/13/2025     06/13/2025    MPV 9.1 (L) 06/13/2025    GRAN 4.5 03/16/2025    GRAN 56.2 03/16/2025    LYMPH 32.3 06/13/2025    LYMPH 3.06 06/13/2025    MONO 5.9 06/13/2025    MONO 0.56 06/13/2025    EOS 1.3 06/13/2025    EOS 0.12 06/13/2025    BASO 0.02 03/16/2025    EOSINOPHIL 0.3 03/16/2025    BASOPHIL 0.4 06/13/2025    BASOPHIL 0.04 06/13/2025     Sodium   Date Value Ref Range Status   06/13/2025 135 (L) 136 - 145 mmol/L Final   03/16/2025 130 (L) 136 - 145 mmol/L Final     Potassium   Date Value Ref Range Status   06/13/2025 4.0 3.5 - 5.1 mmol/L Final   03/16/2025 5.0 3.5 - 5.1 mmol/L Final     Chloride   Date Value Ref Range Status   06/13/2025 102 95 - 110 mmol/L Final   03/16/2025 101 95 - 110 mmol/L Final     CO2   Date Value Ref Range Status   06/13/2025 21 (L) 23 - 29 mmol/L Final   03/16/2025 19 (L) 23 - 29 mmol/L Final     Glucose   Date Value Ref Range Status   06/13/2025 136 (H) 70 - 110 mg/dL Final   03/16/2025 442 (H) 70 - 110 mg/dL Final     BUN   Date Value Ref Range Status   06/13/2025 13 6 - 20 mg/dL Final     Creatinine   Date Value Ref Range Status   06/13/2025 0.7 0.5 - 1.4 mg/dL Final     Calcium   Date Value Ref Range Status   06/13/2025 9.8 8.7 - 10.5 mg/dL Final   03/16/2025 9.0 8.7 - 10.5 mg/dL Final     Protein Total   Date Value Ref Range Status   06/13/2025 7.6 6.0 - 8.4 gm/dL Final     Total Protein   Date Value Ref Range Status   03/16/2025 6.7 6.0 - 8.4 g/dL Final     Albumin   Date Value Ref Range Status   06/13/2025 3.6 3.5 - 5.2 g/dL Final   03/16/2025 3.4 (L) 3.5 - 5.2 g/dL Final     Total Bilirubin   Date Value Ref Range Status   03/16/2025 0.1 0.1 - 1.0 mg/dL Final     Comment:     For infants and newborns, interpretation of results should be based  on gestational age, weight and in agreement with clinical  observations.    Premature  "Infant recommended reference ranges:  Up to 24 hours.............<8.0 mg/dL  Up to 48 hours............<12.0 mg/dL  3-5 days..................<15.0 mg/dL  6-29 days.................<15.0 mg/dL       Bilirubin Total   Date Value Ref Range Status   06/13/2025 0.2 0.1 - 1.0 mg/dL Final     Comment:     For infants and newborns, interpretation of results should be based   on gestational age, weight and in agreement with clinical   observations.    Premature Infant recommended reference ranges:   0-24 hours:  <8.0 mg/dL   24-48 hours: <12.0 mg/dL   3-5 days:    <15.0 mg/dL   6-29 days:   <15.0 mg/dL     Alkaline Phosphatase   Date Value Ref Range Status   03/16/2025 116 40 - 150 U/L Final     ALP   Date Value Ref Range Status   06/13/2025 109 40 - 150 unit/L Final     AST   Date Value Ref Range Status   06/13/2025 18 11 - 45 unit/L Final   03/16/2025 14 10 - 40 U/L Final     ALT   Date Value Ref Range Status   06/13/2025 25 10 - 44 unit/L Final   03/16/2025 24 10 - 44 U/L Final     Anion Gap   Date Value Ref Range Status   06/13/2025 12 8 - 16 mmol/L Final     eGFR   Date Value Ref Range Status   06/13/2025 >60 >60 mL/min/1.73/m2 Final     Comment:     Estimated GFR calculated using the CKD-EPI creatinine (2021) equation.   03/16/2025 >60 >60 mL/min/1.73 m^2 Final      Lab Results   Component Value Date    SEDRATE 80 (H) 05/05/2025     Lab Results   Component Value Date    CRP 10.5 (H) 05/05/2025     Lab Results   Component Value Date    EOS 1.3 06/13/2025    EOS 0.12 06/13/2025     Lab Results   Component Value Date    EOSINOPHIL 0.3 03/16/2025     Lab Results   Component Value Date    IGE 35 09/04/2024     Lab Results   Component Value Date    RF <10.0 10/14/2019     Lab Results   Component Value Date    ASPERGILLUS <0.35 07/17/2017    ASPERGILLUS <0.35 07/17/2017     No results found for: "FUNGIASSA"  Lab Results   Component Value Date    ASPERGILLUS <0.35 07/17/2017    ASPERGILLUS <0.35 07/17/2017       X-Ray Chest " "AP Portable  EXAM: XR CHEST AP PORTABLE    CLINICAL HISTORY: Chest pain    PRIOR:  06/07/2025    FINDINGS:   The lungs are well aerated. No shift of the mediastinum.  No sizable pleural effusion or pneumothorax.    IMPRESSION:  No active or adverse finding    Finalized on: 6/13/2025 8:36 PM By:  Nolvia Justice MD  [facility]# [id number]      2025-06-13 20:38:43.298     [facility]     Assessment/Plan:   1. ALEN (obstructive sleep apnea)  -     HME - OTHER    2. Obstructive sleep apnea  Overview:  9/1/24 1 Night HST: Moderate ALEN AHI=12 RDI=22    APAP Settings: 5-20 cm H2O   Mask     HME: [facility]     Assessment & Plan:      7/1/2025   EPWORTH SLEEPINESS SCALE   Sitting and reading 2   Watching TV 3   Sitting, inactive in a public place (e.g. a theatre or a meeting) 3   As a passenger in a car for an hour without a break 3   Lying down to rest in the afternoon when circumstances permit 3   Sitting and talking to someone 3   Sitting quietly after a lunch without alcohol 3   In a car, while stopped for a few minutes in traffic 1   Total score 21        Patient-reported     Plan:  See RT to verify device via serial number  Continue CPAP use  Follow-up for device download or PRN        Encounter Medications[2]  Orders Placed This Encounter   Procedures    HME - OTHER     Verify CPAP device in patient's possession is hers via registered serial number. Believes she was discharged with the wrong device.     Type of Equipment::   CPAP     Height::   4' 8" (1.422 m)     Weight::   90.7 kg (200 lb)     Discussed diagnosis, its evaluation, treatment and usual course. All questions answered.    Patient verbalized understanding of plan and left in no acute distress.    Follow up in about 3 months (around 10/1/2025), or see RT to verify device, Device download.    Note has been documented by KELI Núñez 7/1/2025     Thank you for allowing me to participate in the care of this patient,    KELI Núñez     Pulmonary " Disease           [1]   Patient Active Problem List  Diagnosis    Essential hypertension    GERD (gastroesophageal reflux disease)    Menopausal syndrome (hot flashes)    Migraine with aura and without status migrainosus, not intractable    Abnormal ECG    Sleep-related bruxism    Diabetic polyneuropathy associated with type 2 diabetes mellitus    Moderate persistent asthma without complication    Bipolar 1 disorder    Diffusion capacity of lung (dl), decreased    Hypertriglyceridemia    Generalized anxiety disorder    Type 2 diabetes mellitus with hyperglycemia, with long-term current use of insulin    Iron deficiency anemia    Schizoaffective disorder, bipolar type    Vitamin D deficiency    Fibromyalgia    Hypertension complicating diabetes    Dyslipidemia associated with type 2 diabetes mellitus    Seasonal allergic rhinitis due to pollen    Nocturnal hypoxemia due to obesity - WITHOUT ALEN    Hyperaldosteronism    BMI 40.0-44.9, adult    High risk of appointment cancellation or no-show    Irritable bowel syndrome with both constipation and diarrhea    Right shoulder tendonitis    Chronic neck and back pain    Proximal muscle weakness    Snoring    Circadian rhythm disorder    Hypersomnolence disorder    Fatty liver    Bilateral carpal tunnel syndrome    Type 2 diabetes mellitus    History of anorexia nervosa    Bipolar disord, crnt epsd depress, severe, w psych features    Sleep problem caused by drug    DM type 2 with diabetic peripheral neuropathy    PLMD (periodic limb movement disorder)    Idiopathic hypersomnolence    Chronic idiopathic constipation    Class 3 severe obesity with serious comorbidity and body mass index (BMI) of 40.0 to 44.9 in adult    Mixed hyperlipidemia    Counseling for hormone replacement therapy    Candida glabrata infection    Ankylosing spondylitis    Dysuria    Subacute vaginitis    Chronic constipation    Plantar fasciitis, bilateral    Hyperglycemia    Decreased strength,  "endurance, and mobility    Decreased range of motion of both ankles    Bilateral foot pain    Generalized abdominal pain    Decreased  strength    Decreased activities of daily living (ADL)    Chronic bilateral low back pain with bilateral sciatica    Weakness of both lower extremities    Decreased mobility and endurance    Dyspnea due to COVID-19    Hepatomegaly    DAVIS (nonalcoholic steatohepatitis)    Drug-induced immunodeficiency    Encounter for medication monitoring    Sclerosis of sacroiliac joint    Obstructive sleep apnea    SDH (subdural hematoma)    Abnormal head movements    Dehydration    Chest pain    Nausea    Family history of bladder cancer   [2]   Outpatient Encounter Medications as of 7/1/2025   Medication Sig Dispense Refill    albuterol (PROVENTIL) 2.5 mg /3 mL (0.083 %) nebulizer solution Take 2.5 mg by nebulization every 4 (four) hours as needed. Times a day 75 mL 11    ALPRAZolam (XANAX) 1 MG tablet Take 1 tablet (1 mg total) by mouth daily as needed for Anxiety. 30 tablet 1    amLODIPine (NORVASC) 10 MG tablet Take 1 tablet (10 mg total) by mouth once daily. 30 tablet 11    atorvastatin (LIPITOR) 20 MG tablet Take 1 tablet (20 mg total) by mouth every evening 90 tablet 3    BD INSULIN SYRINGE ULTRA-FINE 1/2 mL 30 gauge x 1/2" Syrg   0    blood-glucose sensor (DEXCOM G6 SENSOR) Mariela change sensor every 10 days. 3 each 11    blood-glucose transmitter (DEXCOM G6 TRANSMITTER) Mariela 1 each by Misc.(Non-Drug; Combo Route) route every 3 (three) months. 1 each 3    clotrimazole-betamethasone 1-0.05% (LOTRISONE) cream Apply topically 2 (two) times daily. 45 g 1    diclofenac sodium (VOLTAREN) 1 % Gel Apply 2 g topically 3 (three) times daily as needed (pain) 100 g 0    DULoxetine (CYMBALTA) 60 MG capsule Take 1 capsule (60 mg total) by mouth 2 (two) times daily. 60 capsule 1    ergocalciferol (ERGOCALCIFEROL) 50,000 unit Cap Take 1 capsule (50,000 Units total) by mouth every 7 days. 4 capsule 6    " "estradioL (ESTRACE) 0.01 % (0.1 mg/gram) vaginal cream Place 1 g vaginally twice a week. 42.5 g 11    eszopiclone (LUNESTA) 1 MG Tab Take 1 tablet (1 mg total) by mouth at bedtime as needed 30 tablet 1    fenofibrate (TRICOR) 145 MG tablet Take 1 tablet by mouth in the morning. 30 tablet 5    fluticasone propionate (FLONASE) 50 mcg/actuation nasal spray Take 1 spray by Each Nostril route in the morning. 16 g 3    folic acid (FOLVITE) 1 MG tablet Take 1 tablet by mouth every day in the morning 30 tablet 5    furosemide (LASIX) 20 MG tablet Take 1 tablet (20 mg total) by mouth once daily. 90 tablet 3    glucagon (BAQSIMI) 3 mg/actuation [place] SPRAY 1 SPRAY IN NOSTRIL AS NEEDED FOR EMERGENCY. MAY REPEAT 1 TIME AFTER 15 MINUTES 2 each 1    haloperidoL (HALDOL) 2 MG tablet Take 1 tablet (2 mg total) by mouth 2 (two) times daily. 60 tablet 2    hydrocortisone (ANUSOL-HC) 2.5 % rectal cream Place rectally once daily for 10 days 30 g 2    insulin aspart U-100 (NOVOLOG FLEXPEN U-100 INSULIN) 100 unit/mL (3 mL) InPn pen Inject 9 Units into the skin 3 (three) times daily before meals. Use sliding scale for small medium and large meals 15 mL 3    insulin aspart U-100 (NOVOLOG U-100 INSULIN ASPART) 100 unit/mL injection To use via insulin pump. Up to 200 units every 2 days 30 mL 5    insulin glargine U-100, Lantus, (LANTUS SOLOSTAR U-100 INSULIN) 100 unit/mL (3 mL) InPn pen Inject 16 Units into the skin once daily. Use in the event of pump failure 15 mL 3    insulin pump cart,auto,BT,G6/7 (OMNIPOD 5 G6-G7 PODS, GEN 5,) Crtg 1 each by Misc.(Non-Drug; Combo Route) route every 48 hours. 45 each 3    insulin syringe-needle U-100 0.3 mL 30 gauge x 5/16" Syrg 1 each by Misc.(Non-Drug; Combo Route) route Every 3 (three) days. 100 each 2    ixekizumab (TALTZ AUTOINJECTOR) 80 mg/mL AtIn Inject 80 mg into the skin every 28 days. 2 mL 3    lamoTRIgine (LAMICTAL) 100 MG tablet Take 1 tablet (100 mg total) by mouth 2 (two) times daily. 60 " "tablet 1    levETIRAcetam (KEPPRA) 750 MG Tab Take 1 tablet by mouth in the morning and 1 tablet before bedtime 60 tablet 6    levocetirizine (XYZAL) 5 MG tablet Take 1 tablet (5 mg total) by mouth every evening. 30 tablet 11    LIDOcaine (LIDODERM) 5 % Place 2 patches onto the skin every 12 (twelve) hours as needed (pain). Remove & Discard patch within 12 hours or as directed by MD 60 patch 11    lifitegrast (XIIDRA) 5 % Dpet Place 1 drop into both eyes 2 (two) times daily. 60 each 12    linaCLOtide (LINZESS) 145 mcg Cap capsule Take 1 capsule (145 mcg total) by mouth daily as needed (for constipation). 30 capsule 11    magnesium oxide (MAG-OX) 400 mg (241.3 mg magnesium) tablet Take 1 tablet (400 mg total) by mouth once daily. 90 tablet 3    melatonin (MELATIN) 3 mg tablet Take 2 tablets (6 mg total) by mouth nightly. 180 tablet 0    metoprolol succinate (TOPROL-XL) 50 MG 24 hr tablet Take 1 tablet (50 mg total) by mouth every morning. 30 tablet 11    omeprazole (PRILOSEC) 40 MG capsule Take 1 capsule (40 mg total) by mouth once daily. 90 capsule 1    ondansetron (ZOFRAN) 4 MG tablet Take 4 mg by mouth every 6 (six) hours.      ondansetron (ZOFRAN) 4 MG tablet Take 1 tablet (4 mg total) by mouth every 6 (six) hours. 12 tablet 0    OZEMPIC 2 mg/dose (8 mg/3 mL) PnIj Inject 2 mg into the skin every 7 days. 3 mL 1    pen needle, diabetic (BD ULTRA-FINE MINI PEN NEEDLE) 31 gauge x 3/16" Ndle Use 1 a day in event of pump failure 100 each 1    polyethylene glycol (GLYCOLAX) 17 gram/dose powder Take 17 g by mouth 2 (two) times daily. 1020 g 0    terconazole (TERAZOL 7) 0.4 % Crea Place 1 applicator vaginally once daily. 45 g 1    [DISCONTINUED] clonazePAM (KLONOPIN) 1 MG tablet Take 1 tablet by mouth daily 30 tablet 0    [DISCONTINUED] glucagon, human recombinant, (GLUCAGON EMERGENCY KIT, HUMAN,) 1 mg SolR Inject 1 mg into the muscle as needed. Emergency use 1 each 1    [DISCONTINUED] valsartan (DIOVAN) 320 MG tablet " Take 1 tablet (320 mg total) by mouth once daily. 90 tablet 3     No facility-administered encounter medications on file as of 7/1/2025.

## 2025-07-01 NOTE — TELEPHONE ENCOUNTER
Reached out to pt to get her MRI review scheduled. Pt was a little tired. I asked if she would like for me to give her a call back a little later. She said yes. Set a reminder for 330pm.    Tracie LEI MA

## 2025-07-01 NOTE — ASSESSMENT & PLAN NOTE
7/1/2025   EPWORTH SLEEPINESS SCALE   Sitting and reading 2   Watching TV 3   Sitting, inactive in a public place (e.g. a theatre or a meeting) 3   As a passenger in a car for an hour without a break 3   Lying down to rest in the afternoon when circumstances permit 3   Sitting and talking to someone 3   Sitting quietly after a lunch without alcohol 3   In a car, while stopped for a few minutes in traffic 1   Total score 21        Patient-reported     Plan:  See RT to verify device via serial number  Continue CPAP use  Follow-up for device download or PRN

## 2025-07-05 ENCOUNTER — NURSE TRIAGE (OUTPATIENT)
Dept: ADMINISTRATIVE | Facility: CLINIC | Age: 55
End: 2025-07-05
Payer: MEDICARE

## 2025-07-05 NOTE — TELEPHONE ENCOUNTER
Yolanda reports she thinks she had an appt scheduled for 8 today with Dr. Young, asking if Dr. Young is on call. Chart reviewed. Informed pt appt is scheduled for 7/7/25. Yolanda v/u. Denies further needs at this time.     Reason for Disposition   General information question, no triage required and triager able to answer question    Protocols used: Information Only Call - No Triage-A-

## 2025-07-07 ENCOUNTER — OFFICE VISIT (OUTPATIENT)
Dept: PSYCHIATRY | Facility: CLINIC | Age: 55
End: 2025-07-07
Payer: MEDICAID

## 2025-07-07 ENCOUNTER — DOCUMENTATION ONLY (OUTPATIENT)
Dept: PSYCHIATRY | Facility: CLINIC | Age: 55
End: 2025-07-07
Payer: MEDICARE

## 2025-07-07 DIAGNOSIS — G47.11 IDIOPATHIC HYPERSOMNOLENCE: ICD-10-CM

## 2025-07-07 DIAGNOSIS — F25.0 SCHIZOAFFECTIVE DISORDER, BIPOLAR TYPE: Primary | Chronic | ICD-10-CM

## 2025-07-07 DIAGNOSIS — F41.1 GENERALIZED ANXIETY DISORDER: Chronic | ICD-10-CM

## 2025-07-07 PROCEDURE — 90833 PSYTX W PT W E/M 30 MIN: CPT | Mod: 95,,, | Performed by: PSYCHOLOGIST

## 2025-07-07 PROCEDURE — G2211 COMPLEX E/M VISIT ADD ON: HCPCS | Mod: 95,,, | Performed by: PSYCHOLOGIST

## 2025-07-07 PROCEDURE — 1159F MED LIST DOCD IN RCRD: CPT | Mod: CPTII,95,, | Performed by: PSYCHOLOGIST

## 2025-07-07 PROCEDURE — 98006 SYNCH AUDIO-VIDEO EST MOD 30: CPT | Mod: 95,,, | Performed by: PSYCHOLOGIST

## 2025-07-07 PROCEDURE — 3052F HG A1C>EQUAL 8.0%<EQUAL 9.0%: CPT | Mod: CPTII,95,, | Performed by: PSYCHOLOGIST

## 2025-07-07 RX ORDER — ALPRAZOLAM 1 MG/1
1 TABLET ORAL DAILY PRN
Qty: 30 TABLET | Refills: 0 | Status: SHIPPED | OUTPATIENT
Start: 2025-07-07 | End: 2025-08-06

## 2025-07-07 NOTE — PROGRESS NOTES
Outpatient Psychiatry Follow-Up Visit    7/7/2025    Virtual Visit    The patient location is: Patient's home/ Patient reported that his/her location at the time of this visit was in the [facility]     Visit type: Virtual visit with synchronous audio and video     Each patient to whom he or she provides medical services by telehealth is: (1) informed of the relationship between the medical psychologist and patient and the respective role of any other health care provider with respect to management of the patient; and (2) notified that he or she may decline to receive medical services by telehealth and may withdraw from such care at any time.    I also informed patient of the following:   Mariam Young, PhD, MPAP:  [place] medical license number: [id number]    My contact info:  [facility] / 2nd Floor  [address] [place] [place] [address]   Ph: [phone]    If technology issues, call office phone: Ph: [phone] or [phone]  If crisis: Dial 911 or go to nearest Emergency Room (ER)  If questions related to privacy practices: contact [facility] Information Department: [phone]    Preferred Name: Yolanda  Gender Identity: cis female  Preferred Pronouns: she/her    History of Present Illness    CHIEF COMPLAINT:  Yolanda presents for follow-up regarding schizoaffective disorder, anxiety, and sleep issues, as discussed in their appointment last month.    HPI:  Yolanda reports interrupted sleep patterns, waking up at 2:00 AM and falling back asleep around 6:00-6:30 AM--but then later saying that she did not go to sleep until 2 or 2:30 and woke up earlier. She typically goes to bed around 10:00 PM, getting about 4-5 hours of initial sleep before waking, though this was unclear and inconsistent. Yolanda experiences daytime sleepiness and does not feel refreshed upon waking. She has been taking Lunesta and melatonin as a sleep aid, but it is not clear if these are even  "needed as her sleep schedule is off. We had previously discussed "holding" the Lunesta, but it was filled and she has taken it. Today, we discontinued it so that it will not be filled again.    Yolanda expresses feeling "numb" and states, "I don't even know how I feel." She mentions a sense of ongoing struggle, saying, "I feel like I'm going down that same road, that rabbit road." Provider noted that we have not really gotten off that road. This suggests a persistent state of emotional difficulty or instability and ongoing challenges.    Yolanda reports experiencing seizure-like episodes, including mild and more significant ones. She describes these episodes as involving shaking, which can last for varying durations. The most recent significant episode occurred on Saturday, after which the patient fell asleep and woke up feeling refreshed. During the appointment, the patient mentions experiencing a mild episode, stating, "Like right now I'm having one. That shaking that's happening." Provider was not able to see her whole body, only upper body (shoulder and up)--there was some shaking on right side--it did not last throughout visit.    Yolanda expresses concern about her cognitive function, requesting testing for "mental stability," specifically mentioning dementia and Alzheimer's. She reports confusion with days, stating, "My day is so off" and "My days are so mixed up," indicating potential issues with time orientation. She has had appointments with neurology but has missed some of the testing scheduled for the seizures. Yolanda has missed or canceled several neurology appointments due to transportation issues and confusion about scheduling.    Yolanda is currently living alone while home renovations are being completed. She is not currently driving due to her seizure-like episodes and relies on others for transportation. Yolanda is not currently in an Intensive Outpatient Program (IOP) but acknowledges it might be helpful. She has " had positive experiences with a program at [facility] in the past.     MEDICATIONS:  Yolanda is on Lamictal 100 mg bid, Haldol 2 mg bid, Cymbalta 60 mg bid, Xanax 0.5 mg bid, Lunesta 1 mg at night; she is also taking Melatonin orally, which helps her with falling asleep.    TESTS:  Yolanda was scheduled for an Epilepsy Monitoring Unit (EMU) test, but it was not completed due to transportation issues.    LIFESTYLE:  Yolanda lives alone in her own house. She is currently having work done on her house, including renovations to the floors and bathroom. She is not participating in any Intensive Outpatient Program (IOP) at present. Yolanda appears to have limited social engagement or activities outside the home.      ROS:  Constitutional: +sleep disturbances  ENT: +sore throat  Neurological: +seizures, +involuntary movements, +difficulty staying asleep, +tremors       PSYCHIATRIC: Pertinant items are noted in the narrative.    Past Medical, Family and Social History: The patient's past medical, family and social history have been reviewed and updated as appropriate within the electronic medical record - see encounter notes.     since June 2025.              6/30/2025     4:05 AM 6/2/2025    12:36 PM 5/19/2025     8:40 AM   GAD7   1. Feeling nervous, anxious, or on edge? 3 3 3   2. Not being able to stop or control worrying? 2 3 3   3. Worrying too much about different things? 3 3 3   4. Trouble relaxing? 3 3 3   5. Being so restless that it is hard to sit still? 3 3 3   6. Becoming easily annoyed or irritable? 3 3 3   7. Feeling afraid as if something awful might happen? 3 3 3   8. If you checked off any problems, how difficult have these problems made it for you to do your work, take care of things at home, or get along with other people? 3 3 3   JOANN-7 Score 20  21  21        Patient-reported      0-4 = Minimal anxiety  5-9 = Mild anxiety  10-14 = Moderate anxiety  15-21 = Severe anxiety       Risk Parameters:  Patient reports no  "suicidal ideation  Patient reports no homicidal ideation  Patient reports no self-injurious behavior  Patient reports no violent behavior    Exam (detailed: at least 9 elements; comprehensive: all 15 elements)   Constitutional  Vitals:  Most recent vital signs were reviewed.   Last 3 sets of Vitals        6/20/2025     1:14 PM 6/24/2025     2:32 PM 7/1/2025    12:17 PM   Vitals - 1 value per visit   SYSTOLIC 126 126    DIASTOLIC 78 80    Pulse 87 90    Temp 97 °F (36.1 °C)     Resp 18     SPO2 98 %     Weight (lb)  204.15 200   Weight (kg)  92.6 90.719   Height  4' 8" (1.422 m) 4' 8" (1.422 m)   BMI (Calculated)  45.8 44.9   Pain Score Ten Ten           General:  age appropriate, disheveled, had just woken up     Musculoskeletal  Muscle Strength/Tone:  Unable to see who body; looked like some shaking on right side at times but not throughout visit   Gait & Station:  video visit     Psychiatric  Speech:  slowed   Behavior: Some shaking (right arm?); did not last throughout; appeared groggy at first (had just woken up)   Mood & Affect:  anxious, depressed  congruent and appropriate   Thought Process:  normal and logical   Associations:  intact   Thought Content:  normal, no suicidality, no homicidality, delusions, or paranoia, not reported   Insight:  has awareness of illness   Judgement: behavior is adequate to circumstances   Orientation:  grossly intact   Memory: intact for content of interview   Language: grossly intact   Attention Span & Concentration:  Decreased   Fund of Knowledge:  intact and appropriate to age and level of education     General Impression:       ICD-10-CM ICD-9-CM   1. Schizoaffective disorder, bipolar type  F25.0 295.70   2. Generalized anxiety disorder  F41.1 300.02   3. Idiopathic hypersomnolence  G47.11 780.54        Assessment & Plan    Assessed sleep issues, noting interrupted sleep patterns and daytime sleepiness.  Discontinued Lunesta and melatonin to address potential holdover " effects and improve sleep-wake cycle.  Considered IOP program to provide structure and daily monitoring for mental health.  Recognized importance of neurological evaluation for ongoing seizure-like activity.  Reviewed neurology appointment history and current scheduling challenges.    PLAN SUMMARY:   Avoid daytime naps   Referred to case management for additional support   Referred to IOP program at [facility] or suitable alternative   Maintain consistent wake-up time   Discontinued Lunesta and melatonin   Increase daytime activities outside the house    SCHIZOAFFECTIVE DISORDER/ANXIETY:   Continued Lamictal 100 mg bid, Haldol 2 mg bid, Cymbalta 60 mg bid, Xanax 0.5 mg bid prn.    SLEEP DISORDERS:   Yolanda to maintain consistent wake-up time regardless of sleep onset.   Yolanda to avoid naps during the day.   Recommend increasing daytime activities outside the house to improve sleep schedule.   Discontinued Lunesta and melatonin to address potential holdover effects and improve sleep-wake cycle.    CASE MANAGEMENT:   Referred to case management for additional support.    INTENSIVE OUTPATIENT PROGRAM:   Referred to IOP program at [facility] or suitable alternative.    Return as needed (after discharge from IOP).        I spent an additional 30 minutes performing E/M services with >50% spent on counseling, guidance, coordinating care (not Psychotherapy related) in addition to the 16 minutes performing Psychotherapy.    Face to Face time with patient: 25  46 minutes of total time spent on the encounter, which includes face to face time and non-face to face time preparing to see the patient (eg, review of tests), Obtaining and/or reviewing separately obtained history, Documenting clinical information in the electronic or other health record, Independently interpreting results (not separately reported) and communicating results to the patient/family/caregiver, or Care coordination (not separately reported).        Mariam Young, PhD,  CRISTINA  Advanced Practice Medical Psychologist  [facility]  [address].  [place] [address]  [phone]   [phone] fax    This note was generated with the assistance of ambient listening technology. Verbal consent was obtained by the patient and accompanying visitor(s) for the recording of patient appointment to facilitate this note. I attest to having reviewed and edited the generated note for accuracy, though some syntax or spelling errors may persist. Please contact the author of this note for any clarification.    Est Patient Virtual Visit requirements-  41179  SYNCHRONOUS AUDIO-VIDEO VISIT, EST, SF MDM 10+ MIN  29387  SYNCHRONOUS AUDIO-VIDEO VISIT, EST, LOW MDM 20+ MIN  58035  SYNCHRONOUS AUDIO-VIDEO VISIT, EST, MOD MDM 30+ MIN  86889  SYNCHRONOUS AUDIO-VIDEO VISIT, EST, HIGH MDM 40+ MIN  (This text will automatically delete):85272}

## 2025-07-07 NOTE — PROGRESS NOTES
CASE MANAGEMENT REFERRAL    MRN: [id number]  : 1970  Reason for referral: needs IOP--try [facility]; has been there before and thought okay; does NOT want [facility]    thanks

## 2025-07-07 NOTE — PATIENT INSTRUCTIONS

## 2025-07-10 ENCOUNTER — TELEPHONE (OUTPATIENT)
Dept: PSYCHIATRY | Facility: CLINIC | Age: 55
End: 2025-07-10
Payer: MEDICARE

## 2025-07-10 NOTE — TELEPHONE ENCOUNTER
Nurse spoke with her. She stated that the provider took her off of Lunesta and Melatonin. She said that's not working. She is not getting any sleep. Nurse informed patient that she will give the message to the provider. Nurse sent provider the patient message regarding the medication.     Copied from CRM #[id number]. Topic: General Inquiry - Patient Advice  >> Jul 10, 2025  9:53 AM Romina wrote:  .Type:  Patient Requesting Call    Who Called:Yolanda Betts  Does the patient know what this is regarding?:Patient requesting a call back in regards to medication  Would the patient rather a call back or a response via [facility]? Call back  Best Call Back Number:.[phone] (home)   Additional Information:

## 2025-07-11 DIAGNOSIS — Z79.4 TYPE 2 DIABETES MELLITUS WITH HYPERGLYCEMIA, WITH LONG-TERM CURRENT USE OF INSULIN: Chronic | ICD-10-CM

## 2025-07-11 DIAGNOSIS — E11.65 TYPE 2 DIABETES MELLITUS WITH HYPERGLYCEMIA, WITH LONG-TERM CURRENT USE OF INSULIN: Chronic | ICD-10-CM

## 2025-07-15 DIAGNOSIS — K58.2 IRRITABLE BOWEL SYNDROME WITH BOTH CONSTIPATION AND DIARRHEA: ICD-10-CM

## 2025-07-15 DIAGNOSIS — E11.69 TYPE 2 DIABETES MELLITUS WITH OTHER SPECIFIED COMPLICATION, UNSPECIFIED WHETHER LONG TERM INSULIN USE: ICD-10-CM

## 2025-07-15 RX ORDER — INSULIN ASPART 100 [IU]/ML
INJECTION, SOLUTION INTRAVENOUS; SUBCUTANEOUS
Qty: 30 ML | Refills: 0 | Status: SHIPPED | OUTPATIENT
Start: 2025-07-15

## 2025-07-15 NOTE — TELEPHONE ENCOUNTER
Courtesy refill of Novolog given for coverage of IRASEMA Yun. Needs follow up with bridgette.     Ayush Palomares PA-C  Diabetes Management

## 2025-07-16 RX ORDER — SEMAGLUTIDE 2.68 MG/ML
2 INJECTION, SOLUTION SUBCUTANEOUS
Qty: 3 ML | Refills: 1 | Status: SHIPPED | OUTPATIENT
Start: 2025-07-16 | End: 2025-09-10

## 2025-07-16 RX ORDER — POLYETHYLENE GLYCOL 3350 17 G/17G
17 POWDER, FOR SOLUTION ORAL 2 TIMES DAILY
Qty: 1020 G | Refills: 0 | Status: SHIPPED | OUTPATIENT
Start: 2025-07-16 | End: 2026-07-16

## 2025-07-16 RX ORDER — SPIRONOLACTONE 25 MG/1
TABLET ORAL
Qty: 90 TABLET | Refills: 1 | Status: SHIPPED | OUTPATIENT
Start: 2025-07-16

## 2025-07-21 ENCOUNTER — TELEPHONE (OUTPATIENT)
Dept: UROLOGY | Facility: CLINIC | Age: 55
End: 2025-07-21
Payer: MEDICARE

## 2025-07-21 ENCOUNTER — TELEPHONE (OUTPATIENT)
Dept: DIABETES | Facility: CLINIC | Age: 55
End: 2025-07-21
Payer: MEDICARE

## 2025-07-21 NOTE — TELEPHONE ENCOUNTER
Called patient and let her know that  is going to talk to her first in regards to if she will have the scope done. I let her know that based off of her symptoms he will approve if it is or is not appropriate.  She expressed understanding all questions answered at this time.        Copied from CRM #[id number]. Topic: General Inquiry - Patient Advice  >> Jul 21, 2025 10:19 AM Marcelina wrote:  Type: Patient call    Who called: Patient     Does the patient know what this is regarding? Requesting a call back in regards to needing to reschedule procedure on 7/21 ; not aware of who Dr. Martinez is ; wants to verify if he is just the procedure provider ; please advise     Would the patient rather a call back or response via [facility]? Call    Best call back number: [phone]    Additional information:

## 2025-07-21 NOTE — TELEPHONE ENCOUNTER
Author returned the patients call. The patient states that her last Dexcom sensor malfunctioned and se would like another one. Author explain that at this we don't have sensors for her Dexcom in office, advised patient to contact [employer] for a replacement sensor. The patient stated that she has been out of sensors for 2 weeks and she is shaking. Author advised to call [employer] and ask about sensor zoe's to be able to get her sensors from the pharmacy quicker. The patient reluctantly understood.       Copied from CRM #[id number]. Topic: General Inquiry - Patient Advice  >> Jul 21, 2025 10:26 AM Marcelina wrote:  Type: Patient call    Who called: Patient     Does the patient know what this is regarding? Requesting a call back in regards to needing to discuss medication ; please advise     Would the patient rather a call back or response via [facility]? Call    Best call back number: [phone]    Additional information:

## 2025-07-22 ENCOUNTER — TELEPHONE (OUTPATIENT)
Dept: RHEUMATOLOGY | Facility: CLINIC | Age: 55
End: 2025-07-22
Payer: MEDICARE

## 2025-07-22 ENCOUNTER — TELEPHONE (OUTPATIENT)
Dept: DIABETES | Facility: CLINIC | Age: 55
End: 2025-07-22
Payer: MEDICARE

## 2025-07-22 ENCOUNTER — PATIENT MESSAGE (OUTPATIENT)
Dept: DIABETES | Facility: CLINIC | Age: 55
End: 2025-07-22
Payer: MEDICARE

## 2025-07-22 NOTE — TELEPHONE ENCOUNTER
Spoke w/ patient and she stated that she is in severe pain and weak all over. Her pain starts in her neck. 10/10 on pain scale. She is a patient of Dr. PARKER but he is out of the country until Aug 4th and patient would like to be seen to discuss her pain and possibly receive relief.     Please advise on next steps

## 2025-07-22 NOTE — TELEPHONE ENCOUNTER
Copied from CRM #[id number]. Topic: General Inquiry - Patient Advice  >> Jul 22, 2025 10:36 AM Ching Engle wrote:  .Type: Patient  Requesting Call Back      Who Called: Yolanda    What is this regarding?: Pain all over    Would the patient rather a call back or a response via [facility]?  Call Back    Best Call Back Number: .[phone] (home)    Additional Information:  Patient is requesting a call back in regard to all over pain.

## 2025-07-22 NOTE — TELEPHONE ENCOUNTER
"Pt present in lobby to get dexcom sensor. She reports that she has not had a sensor for 2 weeks. When asked if she needed a resupply order, she stated that "they don't give you enough." She was asked if her sensor failed, she she responded in the affirmative. Encouraged pt to contact [employer] to get another sensor free of charge, but she refused stating she, "I don't have time for that." She requested to speak with educator so that she could get one from her.    Writer contacted  and educator to assist pt.   "

## 2025-07-24 ENCOUNTER — TELEPHONE (OUTPATIENT)
Dept: RHEUMATOLOGY | Facility: CLINIC | Age: 55
End: 2025-07-24
Payer: MEDICARE

## 2025-07-24 NOTE — TELEPHONE ENCOUNTER
Copied from CRM #[id number]. Topic: Escalation - Escalation to Leader  >> Jul 24, 2025  3:46 PM [place] wrote:  .Type:  Needs Medical Advice    Who Called:  Yolanda     Symptoms (please be specific):  Pain all over with and states pain level is at a 12      Would the patient rather a call back or a response via [facility]?  Call back   Best Call Back Number:  [phone]  Additional Information:    due to in pain and she would like to speak with the clinic supervisor due to she was told this morning she would get a return call from rheumatology dept and still has not received the call. Pt also states she sent a message on 07/23

## 2025-07-25 DIAGNOSIS — M45.9 ANKYLOSING SPONDYLITIS, UNSPECIFIED SITE OF SPINE: Primary | ICD-10-CM

## 2025-07-25 RX ORDER — TRIAMCINOLONE ACETONIDE 40 MG/ML
40 INJECTION, SUSPENSION INTRA-ARTICULAR; INTRAMUSCULAR
Status: SHIPPED | OUTPATIENT
Start: 2025-07-25

## 2025-07-28 ENCOUNTER — OFFICE VISIT (OUTPATIENT)
Dept: RHEUMATOLOGY | Facility: CLINIC | Age: 55
End: 2025-07-28
Payer: MEDICARE

## 2025-07-28 ENCOUNTER — NURSE TRIAGE (OUTPATIENT)
Dept: ADMINISTRATIVE | Facility: CLINIC | Age: 55
End: 2025-07-28
Payer: MEDICARE

## 2025-07-28 ENCOUNTER — OCHSNER VIRTUAL EMERGENCY DEPARTMENT (OUTPATIENT)
Facility: CLINIC | Age: 55
End: 2025-07-28
Payer: MEDICARE

## 2025-07-28 DIAGNOSIS — Z79.899 HIGH RISK MEDICATION USE: ICD-10-CM

## 2025-07-28 DIAGNOSIS — M45.9 ANKYLOSING SPONDYLITIS, UNSPECIFIED SITE OF SPINE: Primary | ICD-10-CM

## 2025-07-28 DIAGNOSIS — Z79.899 IMMUNODEFICIENCY DUE TO DRUG THERAPY: ICD-10-CM

## 2025-07-28 DIAGNOSIS — Z79.60 ENCOUNTER FOR MONITORING IMMUNOSUPPRESSIVE MEDICATION THERAPY CAUSING IMMUNODEFICIENCY: ICD-10-CM

## 2025-07-28 DIAGNOSIS — D84.821 IMMUNODEFICIENCY DUE TO DRUG THERAPY: ICD-10-CM

## 2025-07-28 DIAGNOSIS — D84.821 ENCOUNTER FOR MONITORING IMMUNOSUPPRESSIVE MEDICATION THERAPY CAUSING IMMUNODEFICIENCY: ICD-10-CM

## 2025-07-28 DIAGNOSIS — M79.7 FIBROMYALGIA: ICD-10-CM

## 2025-07-28 DIAGNOSIS — Z51.81 ENCOUNTER FOR MONITORING IMMUNOSUPPRESSIVE MEDICATION THERAPY CAUSING IMMUNODEFICIENCY: ICD-10-CM

## 2025-07-28 PROCEDURE — 3052F HG A1C>EQUAL 8.0%<EQUAL 9.0%: CPT | Mod: CPTII,95,, | Performed by: STUDENT IN AN ORGANIZED HEALTH CARE EDUCATION/TRAINING PROGRAM

## 2025-07-28 PROCEDURE — 1159F MED LIST DOCD IN RCRD: CPT | Mod: CPTII,95,, | Performed by: STUDENT IN AN ORGANIZED HEALTH CARE EDUCATION/TRAINING PROGRAM

## 2025-07-28 PROCEDURE — G2211 COMPLEX E/M VISIT ADD ON: HCPCS | Mod: 95,,, | Performed by: STUDENT IN AN ORGANIZED HEALTH CARE EDUCATION/TRAINING PROGRAM

## 2025-07-28 PROCEDURE — 98007 SYNCH AUDIO-VIDEO EST HI 40: CPT | Mod: 95,,, | Performed by: STUDENT IN AN ORGANIZED HEALTH CARE EDUCATION/TRAINING PROGRAM

## 2025-07-28 PROCEDURE — 1160F RVW MEDS BY RX/DR IN RCRD: CPT | Mod: CPTII,95,, | Performed by: STUDENT IN AN ORGANIZED HEALTH CARE EDUCATION/TRAINING PROGRAM

## 2025-07-28 RX ORDER — METHYLPREDNISOLONE 4 MG/1
TABLET ORAL
Qty: 21 EACH | Refills: 0 | Status: SHIPPED | OUTPATIENT
Start: 2025-07-28 | End: 2025-08-18

## 2025-07-28 NOTE — PROGRESS NOTES
The patient location is: [place]  The chief complaint leading to consultation is: ankylosing spondylitis    Visit type: audiovisual    Face to Face time with patient: 15 minutes  20 minutes of total time spent on the encounter, which includes face to face time and non-face to face time preparing to see the patient (eg, review of tests), Obtaining and/or reviewing separately obtained history, Documenting clinical information in the electronic or other health record, Independently interpreting results (not separately reported) and communicating results to the patient/family/caregiver, or Care coordination (not separately reported).         Each patient to whom he or she provides medical services by telemedicine is:  (1) informed of the relationship between the physician and patient and the respective role of any other health care provider with respect to management of the patient; and (2) notified that he or she may decline to receive medical services by telemedicine and may withdraw from such care at any time.    Notes:       Subjective:      Patient ID: Yolanda Betts is a 55 y.o. female.    Chief Complaint: ankylosing spondylitis    HPI:   Patient presents for Rheumatology follow up for ankylosing spondylitis. Recently switched to Taltz and has taken 2 doses so far. Today reports pain all over which starts from waking up in the morning and lasts all day. Pain wakes her up in the middle of the night. Pain is across the shoulders and goes down the spine. Has failed multiple pain therapies and others are contraindicated due to lowering seizure threshold since she has seizure disorder. She follows with Pain Management as well. She has not yet tried medical marijuana as previously suggested because she thought we would prescribe it. +oral ulcers which are chronic. She gets only a little exercise because she is in too much pain. Denies joint swelling, significant stiffness, skin rashes, patchy alopecia, sicca  symptoms, cardiopulmonary symptoms, eye inflammation, IBD, fevers, weight loss, Raynaud's. Rheumatology review of systems is otherwise negative.    Objective:   There were no vitals taken for this visit.  Physical Exam  Constitutional:       General: She is not in acute distress.     Appearance: Normal appearance. She is obese.   HENT:      Mouth/Throat:      Comments: Hyperpigmented lesions on sides of tongue.  Musculoskeletal:         General: No swelling.             Assessment and Plan:     Problem List Items Addressed This Visit          Unprioritized    Fibromyalgia (Chronic)    Ankylosing spondylitis - Primary    Relevant Orders    Ambulatory Referral/Consult to Physical Therapy     Other Visit Diagnoses         High risk medication use          Encounter for monitoring immunosuppressive medication therapy causing immunodeficiency          Immunodeficiency due to drug therapy               Latest Reference Range & Units 06/13/25 17:00   WBC 3.90 - 12.70 K/uL 9.46   RBC 4.00 - 5.40 M/uL 4.55   Hemoglobin 12.0 - 16.0 gm/dL 10.6 (L)   Hematocrit 37.0 - 48.5 % 36.0 (L)   MCV 82 - 98 fL 79 (L)   MCH 27.0 - 31.0 pg 23.3 (L)   MCHC 32.0 - 36.0 g/dL 29.4 (L)   RDW 11.5 - 14.5 % 19.3 (H)   Platelet Count 150 - 450 K/uL 334   MPV 9.2 - 12.9 fL 9.1 (L)   Neut % 38 - 73 % 59.4   Lymph % 18 - 48 % 32.3   Mono % 4 - 15 % 5.9   Eos % <=8 % 1.3   Basophil % <=1.9 % 0.4   Immature Granulocytes 0.0 - 0.5 % 0.7 (H)   Gran # (ANC) 1.8 - 7.7 K/uL 5.61   Lymph # 1 - 4.8 K/uL 3.06   Mono # 0.3 - 1 K/uL 0.56   Eos # <=0.5 K/uL 0.12   Baso # <=0.2 K/uL 0.04   Immature Grans (Abs) 0.00 - 0.04 K/uL 0.07 (H)   nRBC <=0 /100 WBC 0   Sodium 136 - 145 mmol/L 135 (L)   Potassium 3.5 - 5.1 mmol/L 4.0   Chloride 95 - 110 mmol/L 102   CO2 23 - 29 mmol/L 21 (L)   Anion Gap 8 - 16 mmol/L 12   BUN 6 - 20 mg/dL 13   Creatinine 0.5 - 1.4 mg/dL 0.7   eGFR >60 mL/min/1.73/m2 >60   Glucose 70 - 110 mg/dL 136 (H)   Calcium 8.7 - 10.5 mg/dL 9.8   ALP 40  - 150 unit/L 109   PROTEIN TOTAL 6.0 - 8.4 gm/dL 7.6   Albumin 3.5 - 5.2 g/dL 3.6   BILIRUBIN TOTAL 0.1 - 1.0 mg/dL 0.2   AST 11 - 45 unit/L 18   ALT 10 - 44 unit/L 25   Lipase 4 - 60 U/L 17   (L): Data is abnormally low  (H): Data is abnormally high   Latest Reference Range & Units 05/05/25 08:31   CRP <=8.2 mg/L 10.5 (H)   (H): Data is abnormally high   Latest Reference Range & Units 05/05/25 08:31   Sed Rate <=36 mm/hr 80 (H)   (H): Data is abnormally high    X-Ray Lumbar Spine Ap And Lateral  Order: [id number]   Status: Final result       Next appt: 07/30/2025 at 11:00 AM in [facility] (Chris Blankenship NP)    Test Result Released: Yes (seen)    0 Result Notes  Details    Reading Physician Reading Date Result Priority   Chandu Martini MD  [phone]  5/27/2025      Narrative & Impression  EXAM: XR LUMBAR SPINE AP AND LATERAL     CLINICAL HISTORY: Lower back pain.     TECHNIQUE: 3 view lumbar spine x-ray series     FINDINGS: No fracture or subluxation. Disc spaces are maintained. Arthritic changes involving the facets at L5-S1.        Impression:   No acute findings.  See above.     Finalized on: 5/27/2025 6:00 PM By:  Chandu Martini MD  [facility]# [id number]      2025-05-27 18:02:[id number]     [facility]       All of the data above and below has been reviewed by myself and any further interpretations will be reflected in the assessment and plan.   The data includes review of external notes, and independent interpretation of lab results, x-rays, and imaging reports.        Patient presents for Rheumatology follow up for ankylosing spondylitis.     Reviewed above labs. Stable with elevated inflammatory markers.  Reviewed above imaging and I agree with the reports. No syndesmophytes.    Possible flare of ankylosing spondylitis complicated by chronic pain and fibromyalgia. Seizure disorder and multiple allergies are preventing use of many pain modulating medications.    Plan:  Continue Taltz. Has only taken 2 doses so far so  will give this more time to work.  Medrol dosepack for the current flare.  Physical Therapy referral for exercise conditioning.  Agree with vitamin D supplements and Cymbalta, both through other providers.  She will try to get medical marijuana from external provider outside [facility].  Safety and disease monitoring labs with follow up in November.      High Risk Medication Monitoring encounter  Drug therapy requiring intensive monitoring for toxicity  No current medication related issues, no evidence of toxicity  Appropriate labs ordered for toxicity monitoring    Compromised immune system secondary to autoimmune disease and/or use of immunosuppressive drugs.  Monitor carefully for infections.  Advised patient to get immediate medical care if any infection arises.  Also advised strict adherence age-appropriate vaccinations and cancer screenings with PCP.    Patient advised to hold DMARD and/or biologic therapy for signs of infection or for surgery. If you are unsure what to do please call our office for instruction.[facility] [phone]        Today's visit is associated with current or anticipated ongoing medical care related to this patient's diagnosis of ankylosing spondylitis, which is a chronic disease which will require regular follow up to manage symptoms and progression. The patient is to return to the office within a minimum of 3-6 months to review symptoms and function at that time. CPT code

## 2025-07-28 NOTE — TELEPHONE ENCOUNTER
"Pt blood glucose is >400 today since about 2:30 pm. Earlier this morning glucose was 155 when she woke up. Pt uses an omni pod and dexcom. She is unable to explain how her pump is set up when this occurs. She denies fever, vomiting, confusion, increased urination, rapid breathing. States she is slightly more thirsty than normal. Recommended dispo is to discuss with PCP and callback by nurse within one hour. Attempted to reach someone in the diabetes management clinic at [facility] where pt receives care.   Unable to reach anyone there.     Contacted [facility]. Jan Pina MD "She was seen virtually by rheumatology and their plan was a Medrol Dosepak. Has she started taking the steroid today?".  Pt verified she has not started this medicine yet.    MD: "Okay. The Omnipod she is wearing is supposed to be changed every 3 days how old it is the 1 that she is wearing?". Pt verified 2 days.   MD: "And the Omnipod has what they call a smart bolus calculator that suggests insulin dose bolusing based on current glucose levels, carbon take and basal insulin rate. Users that can also manually adjusted and deliver boluses as needed using the Omni pod gill. does she have the ability to use these?"  Pt able to tell me she used the gill and input the required information into the gill including her caloric intake and food she has eaten. It advised a 24 unit bolus which began while she was on this call.   MD: "A 24 unit bolus is pretty robust is at with the device suggested her is that what she just put into the device? " (Based on instructions in epic from last diabetes appt in June).   Pt verified the information she input into the gill, states that scale is different.   MD: "ok. what time did she eat?"  Pt states a nectarine 10 minutes ago and she will have dinner at 5:30 pm.   MD: "if she is going to eat dinner soon then she should be okay. Just recommend she watch for any signs of hypoglycemia and recheck her sugars every hour " "for the next 4 hours and once before bedtime. Recommend an extra two glasses of water as well. As long as she is having no other symptomatology it appears she administered her own therapy."    I discussed this recommendation with pt, she is currently home alone. I advised if she has new or worsening symptoms to call back however if she starts feeling dizzy or weak or feels like she may pass out and her glucose is getting low then she should call 911 since she is home alone. Pt VU.               Pt also c/o sore throat that began around 2:30pm. Current pain is 10/10. Recommended dispo is to see pcp within 24 hours. Pt has an appt on 7/30 with PCP. No earlier appts available. Message routed to clinic requesting earlier appt and advised pt to be seen in  if she is unable to wait for that appt. Discussed care advice for sore throat. Pt VU.                   Reason for Disposition   Blood glucose > 400 mg/dL (22.2 mmol/L)   SEVERE throat pain (e.g., excruciating)    Additional Information   Negative: Unconscious or difficult to awaken   Negative: Acting confused (e.g., disoriented, slurred speech)   Negative: Very weak (can't stand)   Negative: Sounds like a life-threatening emergency to the triager   Negative: Vomiting and signs of dehydration (e.g., very dry mouth, lightheaded, dark urine)   Negative: Blood glucose > 240 mg/dL (13.3 mmol/L) and rapid breathing   Negative: Blood glucose > 500 mg/dL (27.8 mmol/L)   Negative: Blood glucose > 240 mg/dL (13.3 mmol/L) AND urine ketones moderate-large (or more than 1+)   Negative: Blood glucose > 240 mg/dL (13.3 mmol/L) and blood ketones > 1.4 mmol/L   Negative: Blood glucose > 240 mg/dL (13.3 mmol/L) AND vomiting AND unable to check for ketones (in blood or urine)   Negative: Vomiting lasting > 4 hours   Negative: Patient sounds very sick or weak to the triager   Negative: Fever > 100.4 F (38.0 C)   Negative: Caller has URGENT medication or insulin device (e.g., pump, " continuous monitoring) question and triager unable to answer question   Negative: SEVERE difficulty breathing (e.g., struggling for each breath, speaks in single words, stridor)   Negative: Sounds like a life-threatening emergency to the triager   Negative: SEVERE PAIN WITH DYSPHAGIA (e.g., can't swallow any liquids, or drooling)   Negative: Unable to open mouth completely   Negative: Fever > 104 F (40 C)   Negative: [1] Difficulty breathing AND [2] not severe   Negative: [1] Drinking very little AND [2] dehydration suspected (e.g., no urine > 12 hours, very dry mouth, very lightheaded)   Negative: Patient sounds very sick or weak to the triager    Protocols used: Diabetes - High Blood Sugar-A-OH, Sore Throat-A-AH

## 2025-07-28 NOTE — PLAN OF CARE-OVED
[facility] Emergency Department Plan of Care Note  Referral Source: Nurse On-Call                           Pt blood glucose is >400 today since about 2:30 pm. Earlier this morning glucose was 155. Pt uses an omni pod and dexcom. She is unable to explain how her pump is set up when this occurs. States she does not know. She denies fever, vomiting, confusion, increased urination, rapid breathing. States she is slightly more thirsty than normal. Recommended dispo is to discuss with PCP and callback by nurse within one hour. Attempted to reach someone in the diabetes management clinic at [facility] where pt receives care. Unable to reach anyone there.     Chief Complaint   Patient presents with    Hyperglycemia   With further discussion patient gave herself a 24 unit bolus using her Omnipod.  Reportedly this is what her omni pod bolus calculator told her to do.    A 24 unit bolus is pretty robust.  [facility] note instructed the following:    For pump failure:  Lantus 16 units once daily    Novolog:  3 units -small meal  6 units- medium meal  9 units- large meal    Correction scale (for steroid use) :  Novolog every 3 hours using correction scale outside of mealtime.  -200: 2 units  -250: 4 units  -300: 6 units  -350: 8 units  BG greater than 350: 10 units      She says she tapped bolus and it brings up the calculator. She entered her caloric intake and what she ate. She entered the glucose amount and it then tells her the amount     ate a snack 10 minutes ago. a nectarine. dinner time is 5:30    if she is going to eat dinner soon then she should be okay. Just recommend she watch for any signs of hypoglycemia and recheck her sugars every hour for the next 4 hours and once before bedtime. Recommend an extra two glasses of water as well. As long as she is having no other symptomatology it appears she administered her own therapy.       Recommendation: Treat in place                             Dx: Hyperglycemia

## 2025-07-31 ENCOUNTER — TELEPHONE (OUTPATIENT)
Dept: RHEUMATOLOGY | Facility: CLINIC | Age: 55
End: 2025-07-31
Payer: MEDICARE

## 2025-07-31 ENCOUNTER — DOCUMENTATION ONLY (OUTPATIENT)
Dept: PSYCHIATRY | Facility: CLINIC | Age: 55
End: 2025-07-31
Payer: MEDICARE

## 2025-07-31 ENCOUNTER — OFFICE VISIT (OUTPATIENT)
Dept: PODIATRY | Facility: CLINIC | Age: 55
End: 2025-07-31
Payer: MEDICARE

## 2025-07-31 ENCOUNTER — HOSPITAL ENCOUNTER (OUTPATIENT)
Dept: RADIOLOGY | Facility: HOSPITAL | Age: 55
Discharge: HOME | End: 2025-07-31
Attending: PODIATRIST
Payer: MEDICARE

## 2025-07-31 DIAGNOSIS — S93.401A SPRAIN OF RIGHT ANKLE, UNSPECIFIED LIGAMENT, INITIAL ENCOUNTER: Primary | ICD-10-CM

## 2025-07-31 DIAGNOSIS — S93.401A SPRAIN OF RIGHT ANKLE, UNSPECIFIED LIGAMENT, INITIAL ENCOUNTER: ICD-10-CM

## 2025-07-31 DIAGNOSIS — L60.0 INGROWN TOENAIL OF BOTH FEET: ICD-10-CM

## 2025-07-31 PROCEDURE — 99999 PR PBB SHADOW E&M-EST. PATIENT-LVL IV: CPT | Mod: PBBFAC,,, | Performed by: PODIATRIST

## 2025-07-31 PROCEDURE — 73610 X-RAY EXAM OF ANKLE: CPT | Mod: TC,RT

## 2025-07-31 PROCEDURE — 73610 X-RAY EXAM OF ANKLE: CPT | Mod: 26,RT,, | Performed by: RADIOLOGY

## 2025-07-31 NOTE — TELEPHONE ENCOUNTER
[facility] has approved Taltz    Auth #  [id number]    6-10-25 til  12-31-25

## 2025-07-31 NOTE — PROGRESS NOTES
"Subjective:     Patient ID: Yolanda Betts is a 55 y.o. female.    Chief Complaint: Ingrown Toenail (Right foot ingrown toenail: c/o pain rate 10/10 at present, pt is diabetic, pt states of right ankle pain as well )    Yolanda is a 55 y.o. female who presents to the clinic complaining of painful ingrown toenail on both feet. Patient points to bilateral hallux medial nail border. Patient rates pain 10/10. Patient also states she fell recently and injured her right ankle. Patient has no other pedal complaints at this time.     Problem List[1]    Medication List with Changes/Refills   Current Medications    ALBUTEROL (PROVENTIL) 2.5 MG /3 ML (0.083 %) NEBULIZER SOLUTION    Take 2.5 mg by nebulization every 4 (four) hours as needed. Times a day    ALPRAZOLAM (XANAX) 1 MG TABLET    Take 1 tablet (1 mg total) by mouth daily as needed for Anxiety.    AMLODIPINE (NORVASC) 10 MG TABLET    Take 1 tablet (10 mg total) by mouth once daily.    ATORVASTATIN (LIPITOR) 20 MG TABLET    Take 1 tablet (20 mg total) by mouth every evening    BD INSULIN SYRINGE ULTRA-FINE 1/2 ML 30 GAUGE X 1/2" SYRG        BLOOD-GLUCOSE SENSOR (DEXCOM G6 SENSOR) NHI    change sensor every 10 days.    BLOOD-GLUCOSE TRANSMITTER (DEXCOM G6 TRANSMITTER) NHI    1 each by Misc.(Non-Drug; Combo Route) route every 3 (three) months.    CLOTRIMAZOLE-BETAMETHASONE 1-0.05% (LOTRISONE) CREAM    Apply topically 2 (two) times daily.    DICLOFENAC SODIUM (VOLTAREN) 1 % GEL    Apply 2 g topically 3 (three) times daily as needed (pain)    DULOXETINE (CYMBALTA) 60 MG CAPSULE    Take 1 capsule (60 mg total) by mouth 2 (two) times daily.    ERGOCALCIFEROL (ERGOCALCIFEROL) 50,000 UNIT CAP    Take 1 capsule (50,000 Units total) by mouth every 7 days.    ESTRADIOL (ESTRACE) 0.01 % (0.1 MG/GRAM) VAGINAL CREAM    Place 1 g vaginally twice a week.    FENOFIBRATE (TRICOR) 145 MG TABLET    Take 1 tablet by mouth in the morning.    FLUTICASONE PROPIONATE (FLONASE) 50 " "MCG/ACTUATION NASAL SPRAY    Take 1 spray by Each Nostril route in the morning.    FOLIC ACID (FOLVITE) 1 MG TABLET    Take 1 tablet by mouth every day in the morning    FUROSEMIDE (LASIX) 20 MG TABLET    Take 1 tablet (20 mg total) by mouth once daily.    GLUCAGON (BAQSIMI) 3 MG/ACTUATION SPRY    SPRAY 1 SPRAY IN NOSTRIL AS NEEDED FOR EMERGENCY. MAY REPEAT 1 TIME AFTER 15 MINUTES    HALOPERIDOL (HALDOL) 2 MG TABLET    Take 1 tablet (2 mg total) by mouth 2 (two) times daily.    HYDROCORTISONE (ANUSOL-HC) 2.5 % RECTAL CREAM    Place rectally once daily for 10 days    INSULIN ASPART U-100 (NOVOLOG FLEXPEN U-100 INSULIN) 100 UNIT/ML (3 ML) INPN PEN    Inject 9 Units into the skin 3 (three) times daily before meals. Use sliding scale for small medium and large meals    INSULIN ASPART U-100 (NOVOLOG U-100 INSULIN ASPART) 100 UNIT/ML INJECTION    To use via insulin pump. Up to 200 units every 2 days    INSULIN GLARGINE U-100, LANTUS, (LANTUS SOLOSTAR U-100 INSULIN) 100 UNIT/ML (3 ML) INPN PEN    Inject 16 Units into the skin once daily. Use in the event of pump failure    INSULIN PUMP CART,AUTO,BT,G6/7 (OMNIPOD 5 G6-G7 PODS, GEN 5,) CRTG    1 each by Misc.(Non-Drug; Combo Route) route every 48 hours.    INSULIN SYRINGE-NEEDLE U-100 0.3 ML 30 GAUGE X 5/16" SYRG    1 each by Misc.(Non-Drug; Combo Route) route Every 3 (three) days.    IXEKIZUMAB (TALTZ AUTOINJECTOR) 80 MG/ML ATIN    Inject 80 mg into the skin every 28 days.    LAMOTRIGINE (LAMICTAL) 100 MG TABLET    Take 1 tablet (100 mg total) by mouth 2 (two) times daily.    LEVETIRACETAM (KEPPRA) 750 MG TAB    Take 1 tablet by mouth in the morning and 1 tablet before bedtime    LEVOCETIRIZINE (XYZAL) 5 MG TABLET    Take 1 tablet (5 mg total) by mouth every evening.    LIDOCAINE (LIDODERM) 5 %    Place 2 patches onto the skin every 12 (twelve) hours as needed (pain). Remove & Discard patch within 12 hours or as directed by MD VIVEROS (XIIDRA) 5 % DPET    Place 1 " "drop into both eyes 2 (two) times daily.    LINACLOTIDE (LINZESS) 145 MCG CAP CAPSULE    Take 1 capsule (145 mcg total) by mouth daily as needed (for constipation).    MAGNESIUM OXIDE (MAG-OX) 400 MG (241.3 MG MAGNESIUM) TABLET    Take 1 tablet (400 mg total) by mouth once daily.    MELATONIN (MELATIN) 3 MG TABLET    Take 2 tablets (6 mg total) by mouth nightly.    METHYLPREDNISOLONE (MEDROL DOSEPACK) 4 MG TABLET    use as directed    METOPROLOL SUCCINATE (TOPROL-XL) 50 MG 24 HR TABLET    Take 1 tablet (50 mg total) by mouth every morning.    OMEPRAZOLE (PRILOSEC) 40 MG CAPSULE    Take 1 capsule (40 mg total) by mouth once daily.    ONDANSETRON (ZOFRAN) 4 MG TABLET    Take 4 mg by mouth every 6 (six) hours.    ONDANSETRON (ZOFRAN) 4 MG TABLET    Take 1 tablet (4 mg total) by mouth every 6 (six) hours.    OZEMPIC 2 MG/DOSE (8 MG/3 ML) PNIJ    Inject 2 mg into the skin every 7 days.    PEN NEEDLE, DIABETIC (BD ULTRA-FINE MINI PEN NEEDLE) 31 GAUGE X 3/16" NDLE    Use 1 a day in event of pump failure    POLYETHYLENE GLYCOL (GLYCOLAX) 17 GRAM/DOSE POWDER    Take 17 g by mouth 2 (two) times daily.    SPIRONOLACTONE (ALDACTONE) 25 MG TABLET    Take 1 tablet by mouth every morning    TERCONAZOLE (TERAZOL 7) 0.4 % CREA    Place 1 applicator vaginally once daily.       Review of patient's allergies indicates:   Allergen Reactions    Codeine Itching    Hydromorphone Other (See Comments)     Can't wake up for long time if taken    Slow to wake up after surgery after receiving    Aleve [naproxen sodium]      Increases BP    Ibuprofen      Increases BP    Neuromuscular blockers, steroidal Hives     some    Pregabalin Itching    Versed [midazolam]     Latex, natural rubber Rash    Morphine Rash     itching    Norco [hydrocodone-acetaminophen] Itching, Rash and Hallucinations    Secobarbital sodium Rash     itching    Tylox [oxycodone-acetaminophen] Rash       Past Surgical History:   Procedure Laterality Date    abcess removed " right back      ANGIOGRAM, CORONARY, WITH LEFT HEART CATHETERIZATION N/A 2024    Procedure: Angiogram, Coronary, with Left Heart Cath;  Surgeon: Jaimie Wills MD;  Location: [facility] CATH LAB;  Service: Cardiology;  Laterality: N/A;    BELT ABDOMINOPLASTY      BREAST SURGERY Bilateral 1996    Reduction    CARPAL TUNNEL RELEASE Bilateral 2023    Procedure: RELEASE, CARPAL TUNNEL;  Surgeon: Neville Roth MD;  Location: [facility] OR;  Service: Orthopedics;  Laterality: Bilateral;  bilateral carpal tunnel release     SECTION      X 2    COLONOSCOPY      COLONOSCOPY N/A 2018    Procedure: colonoscopy for iron deficiency anemia;  Surgeon: Frankie Sanchez MD;  Location: [facility];  Service: Endoscopy;  Laterality: N/A;    COLONOSCOPY N/A 2018    Procedure: COLONOSCOPY;  Surgeon: Frankie Sanchez MD;  Location: [facility];  Service: Endoscopy;  Laterality: N/A;    COLONOSCOPY N/A 3/10/2023    Procedure: COLONOSCOPY;  Surgeon: Lalita Montes De Oca MD;  Location: [facility];  Service: Endoscopy;  Laterality: N/A;    COLONOSCOPY N/A 2024    Procedure: COLONOSCOPY;  Surgeon: Tara Og MD;  Location: [facility];  Service: Endoscopy;  Laterality: N/A;    EPIDURAL STEROID INJECTION INTO CERVICAL SPINE N/A 2023    Procedure: C6/7 IL ROCAEL RN IV Sedation;  Surgeon: Otto Phillips MD;  Location: [facility] PAIN MGT;  Service: Pain Management;  Laterality: N/A;    EPIDURAL STEROID INJECTION INTO CERVICAL SPINE N/A 2024    Procedure: C5/6 IL ROCAEL;  Surgeon: Otto Phillips MD;  Location: [facility] PAIN MGT;  Service: Pain Management;  Laterality: N/A;    ESOPHAGOGASTRODUODENOSCOPY N/A 3/10/2023    Procedure: EGD (ESOPHAGOGASTRODUODENOSCOPY);  Surgeon: Lalita Montes De Oca MD;  Location: [facility];  Service: Endoscopy;  Laterality: N/A;    HYSTERECTOMY      w/ BSO; hypermenorrhea    INJECTION OF ANESTHETIC AGENT AROUND MEDIAL BRANCH NERVES INNERVATING CERVICAL FACET JOINT Bilateral  "12/19/2023    Procedure: Bilateral C5-7 MBB (diagnostic);  Surgeon: Otto Phillips MD;  Location: [facility] PAIN MGT;  Service: Pain Management;  Laterality: Bilateral;    MOUTH SURGERY  1996    OOPHORECTOMY      hyst/bso, hypermenorrhea    ROBOT-ASSISTED CHOLECYSTECTOMY USING DA DARI XI N/A 1/3/2020    Procedure: XI ROBOTIC CHOLECYSTECTOMY;  Surgeon: Damir Driscoll MD;  Location: [facility];  Service: General;  Laterality: N/A;    TOTAL REDUCTION MAMMOPLASTY      TUBAL LIGATION         Family History   Problem Relation Name Age of Onset    Diabetes Mother      Hyperlipidemia Mother      Hypertension Mother      Asthma Mother      COPD Mother      Glaucoma Mother      Thyroid disease Mother      Anesthesia problems Mother          "almost had a cardiac arrest" , blood clots    Hypertension Father      Hyperlipidemia Father      Cancer Father          Brain, lung, liver, kidney    No Known Problems Sister      Hypertension Brother      Alcohol abuse Brother      Heart disease Maternal Grandmother      Hyperlipidemia Maternal Grandmother      Hypertension Maternal Grandmother      Cataracts Maternal Grandmother      Diabetes Maternal Grandmother      Heart disease Maternal Grandfather      Hyperlipidemia Maternal Grandfather      Hypertension Maternal Grandfather      Glaucoma Maternal Grandfather      Cancer Maternal Grandfather      Cataracts Maternal Grandfather      Macular degeneration Maternal Grandfather      Diabetes Maternal Grandfather      Heart disease Paternal Grandmother      Hyperlipidemia Paternal Grandmother      Hypertension Paternal Grandmother      Cataracts Paternal Grandmother      Heart disease Paternal Grandfather      Hyperlipidemia Paternal Grandfather      Hypertension Paternal Grandfather      Cataracts Paternal Grandfather      Breast cancer Maternal Cousin      Breast cancer Maternal Cousin      Breast cancer Maternal Cousin      Breast cancer Maternal Cousin         Social " History[2]    Vitals:    07/31/25 1557   PainSc: 10-Worst pain ever   PainLoc: Toe       Hemoglobin A1C   Date Value Ref Range Status   03/18/2025 9.0 (H) 4.0 - 5.6 % Final     Comment:     ADA Screening Guidelines:  5.7-6.4%  Consistent with prediabetes  >or=6.5%  Consistent with diabetes    High levels of fetal hemoglobin interfere with the HbA1C  assay. Heterozygous hemoglobin variants (HbS, HgC, etc)do  not significantly interfere with this assay.   However, presence of multiple variants may affect accuracy.     11/18/2024 9.7 (H) 4.8 - 5.6 % Final     Comment:              Prediabetes: 5.7 - 6.4           Diabetes: >6.4           Glycemic control for adults with diabetes: <7.0   06/27/2024 6.6 (H) 4.8 - 5.6 % Final     Comment:              Prediabetes: 5.7 - 6.4           Diabetes: >6.4           Glycemic control for adults with diabetes: <7.0   11/29/2023 6.5 (H) 4.0 - 5.6 % Final     Comment:     ADA Screening Guidelines:  5.7-6.4%  Consistent with prediabetes  >or=6.5%  Consistent with diabetes    High levels of fetal hemoglobin interfere with the HbA1C  assay. Heterozygous hemoglobin variants (HbS, HgC, etc)do  not significantly interfere with this assay.   However, presence of multiple variants may affect accuracy.     08/08/2023 5.6 4.0 - 5.6 % Final     Comment:     ADA Screening Guidelines:  5.7-6.4%  Consistent with prediabetes  >or=6.5%  Consistent with diabetes    High levels of fetal hemoglobin interfere with the HbA1C  assay. Heterozygous hemoglobin variants (HbS, HgC, etc)do  not significantly interfere with this assay.   However, presence of multiple variants may affect accuracy.         Review of Systems   Constitutional:  Negative for chills and fever.   Respiratory:  Negative for shortness of breath.    Cardiovascular:  Negative for chest pain, palpitations, orthopnea, claudication and leg swelling.   Gastrointestinal:  Negative for diarrhea, nausea and vomiting.   Musculoskeletal:  Negative  for joint pain.   Skin:  Negative for rash.   Neurological:  Negative for dizziness, tingling, sensory change, focal weakness and weakness.   Psychiatric/Behavioral: Negative.           Objective:      PHYSICAL EXAM: Apperance: Alert and orient in no distress,well developed, and with good attention to grooming and body habits  Lower Extremity Exam   VASCULAR: Dorsalis pedis pulses 2/4 bilateral and Posterior Tibial pulses 2/4 bilateral.   DERMATOLOGICAL: No skin rash, subcutaneous nodules, lesions or ulcers observed. Bilateral hallux nail observed to be mildly incurvated at medial borders and slight obstructed in the nail grooves with soft tissue. No purulent drainage, no odor, and no increased temperature observed to bilateral hallux.   NEUROLOGICAL: Light touch, sharp-dull, proprioception all present and equal bilaterally.    MUSCULOSKELETAL: Muscle strength 5/5 for all foot inverters, everters, plantarflexors, and  dorsiflexors bilateral. Pain on palpation of bilateral hallux medial nail border. No pain on palpation of dorsal nail plate bilateral hallux. Pain on palpation right ankle.         Assessment:       ICD-10-CM ICD-9-CM   1. Sprain of right ankle, unspecified ligament, initial encounter  S93.401A 845.00   2. Ingrown toenail of both feet  L60.0 703.0       Plan:   Sprain of right ankle, unspecified ligament, initial encounter  -     X-Ray Ankle Complete Right; Future; Expected date: 07/31/2025    Ingrown toenail of both feet    I counseled the patient on her conditions, regarding findings of my examination, my impressions, and usual treatment plan.   Patient consented verbal and written to permanent partial nail avulsion of bilateral hallux.  Procedure performed: A local digital paul was administered to the bilateral hallux of  6cc of 1% Lidocaine plain. Attention was then directed to the bilateral hallux to check for adequate anesthesia. A penrose drain tourniquet was applied to the base of the bilateral  hallux for hemostasis.  Attention was then directed the medial nail border to separate using a spatula the most proximal nail border at the eponychium was released to the area of the matrix. Then the border was released at the medial aspect and at the plantar aspect distally to proximally. Using the English anvil, a cut was then made approximately 3mm lateral to the medial nail fold in a longitudinal manner at the distal aspect extending to the proximal end of the nail plate. Using a straight hemostat, the free nail plate segment was clamped and removed. Using a tissue nipper, residual tissue was then removed. The nail groove area was inspected and probed proximally with a curette for any spicules. Next a 60 second application of phenol was applied to the medial nail border. The area was flushed with copious amounts of sterile normal saline. Betadine was applied to the area and dry sterile dressing of gauze and Coflex. The penrose drain tourniquet was released. Neurovascular status was assessed and noted to be intact. Patient tolerated procedure well.   The patient was given oral and written instructions for post-op care including BID soaks of water and Epson salt followed by application of antibiotic ointment  and light dressing.  The patient was instructed to take Tylenol over-the-counter q4h prn pain and to call clinic immediately if there is increased pain, increased redness, pus, fever, chills, nausea, or vomiting present.  Ordered right ankle x-rays to be completed today.   Patient was fitted with lace up ankle brace and instructed on proper usage. This should be worn daily for a minimum of 2 weeks at all times when ambulating.  Patient to return 3 weeks or sooner if needed.            Alfred Sweeney DPM  [facility]          [1]   Patient Active Problem List  Diagnosis    Essential hypertension    GERD (gastroesophageal reflux disease)    Menopausal syndrome (hot flashes)    Migraine with aura and  without status migrainosus, not intractable    Abnormal ECG    Sleep-related bruxism    Diabetic polyneuropathy associated with type 2 diabetes mellitus    Moderate persistent asthma without complication    Bipolar 1 disorder    Diffusion capacity of lung (dl), decreased    Hypertriglyceridemia    Generalized anxiety disorder    Type 2 diabetes mellitus with hyperglycemia, with long-term current use of insulin    Iron deficiency anemia    Schizoaffective disorder, bipolar type    Vitamin D deficiency    Fibromyalgia    Hypertension complicating diabetes    Dyslipidemia associated with type 2 diabetes mellitus    Seasonal allergic rhinitis due to pollen    Nocturnal hypoxemia due to obesity - WITHOUT ALEN    Hyperaldosteronism    BMI 40.0-44.9, adult    High risk of appointment cancellation or no-show    Irritable bowel syndrome with both constipation and diarrhea    Right shoulder tendonitis    Chronic neck and back pain    Proximal muscle weakness    Snoring    Circadian rhythm disorder    Hypersomnolence disorder    Fatty liver    Bilateral carpal tunnel syndrome    Type 2 diabetes mellitus    History of anorexia nervosa    Bipolar disord, crnt epsd depress, severe, w psych features    Sleep problem caused by drug    DM type 2 with diabetic peripheral neuropathy    PLMD (periodic limb movement disorder)    Idiopathic hypersomnolence    Chronic idiopathic constipation    Class 3 severe obesity with serious comorbidity and body mass index (BMI) of 40.0 to 44.9 in adult    Mixed hyperlipidemia    Counseling for hormone replacement therapy    Candida glabrata infection    Ankylosing spondylitis    Dysuria    Subacute vaginitis    Chronic constipation    Plantar fasciitis, bilateral    Hyperglycemia    Decreased strength, endurance, and mobility    Decreased range of motion of both ankles    Bilateral foot pain    Generalized abdominal pain    Decreased  strength    Decreased activities of daily living (ADL)     Chronic bilateral low back pain with bilateral sciatica    Weakness of both lower extremities    Decreased mobility and endurance    Dyspnea due to COVID-19    Hepatomegaly    DAVIS (nonalcoholic steatohepatitis)    Drug-induced immunodeficiency    Encounter for medication monitoring    Sclerosis of sacroiliac joint    Obstructive sleep apnea    SDH (subdural hematoma)    Abnormal head movements    Dehydration    Chest pain    Nausea    Family history of bladder cancer   [2]   Social History  Socioeconomic History    Marital status: Single   Tobacco Use    Smoking status: Never     Passive exposure: Never    Smokeless tobacco: Never   Substance and Sexual Activity    Alcohol use: Not Currently     Comment: socially  No alcohol 72h prior to sx    Drug use: No    Sexual activity: Yes     Partners: Male     Birth control/protection: Surgical     Comment: hyst   Other Topics Concern    Patient feels they ought to cut down on drinking/drug use No    Patient annoyed by others criticizing their drinking/drug use No    Patient has felt bad or guilty about drinking/drug use No    Patient has had a drink/used drugs as an eye opener in the AM No   Social History Narrative    Long-term care nurse     Social Drivers of Health     Financial Resource Strain: High Risk (2/20/2025)    Overall Financial Resource Strain (CARDIA)     Difficulty of Paying Living Expenses: Very hard   Food Insecurity: Food Insecurity Present (2/20/2025)    Hunger Vital Sign     Worried About Running Out of Food in the Last Year: Often true     Ran Out of Food in the Last Year: Often true   Transportation Needs: Unmet Transportation Needs (3/19/2025)    PRAPARE - Transportation     Lack of Transportation (Medical): Yes     Lack of Transportation (Non-Medical): Yes   Physical Activity: Insufficiently Active (2/20/2025)    Exercise Vital Sign     Days of Exercise per Week: 2 days     Minutes of Exercise per Session: 20 min   Stress: Stress Concern Present  (2/20/2025)    [employer] - Occupational Stress Questionnaire     Feeling of Stress : Very much   Housing Stability: High Risk (2/20/2025)    Housing Stability Vital Sign     Unable to Pay for Housing in the Last Year: Yes     Number of Times Moved in the Last Year: 0     Homeless in the Last Year: No

## 2025-08-02 ENCOUNTER — HOSPITAL ENCOUNTER (OUTPATIENT)
Dept: RADIOLOGY | Facility: HOSPITAL | Age: 55
Discharge: HOME | End: 2025-08-02
Attending: NURSE PRACTITIONER
Payer: MEDICARE

## 2025-08-02 DIAGNOSIS — M54.16 LUMBAR RADICULOPATHY: ICD-10-CM

## 2025-08-02 DIAGNOSIS — M54.9 DORSALGIA, UNSPECIFIED: ICD-10-CM

## 2025-08-02 PROCEDURE — 72148 MRI LUMBAR SPINE W/O DYE: CPT | Mod: 26,,, | Performed by: RADIOLOGY

## 2025-08-02 PROCEDURE — 72148 MRI LUMBAR SPINE W/O DYE: CPT | Mod: TC

## 2025-08-06 ENCOUNTER — TELEPHONE (OUTPATIENT)
Dept: DIABETES | Facility: CLINIC | Age: 55
End: 2025-08-06
Payer: MEDICARE

## 2025-08-06 ENCOUNTER — TELEPHONE (OUTPATIENT)
Dept: RHEUMATOLOGY | Facility: CLINIC | Age: 55
End: 2025-08-06
Payer: MEDICARE

## 2025-08-06 ENCOUNTER — PATIENT MESSAGE (OUTPATIENT)
Dept: DIABETES | Facility: CLINIC | Age: 55
End: 2025-08-06
Payer: MEDICARE

## 2025-08-06 DIAGNOSIS — F41.1 GENERALIZED ANXIETY DISORDER: ICD-10-CM

## 2025-08-06 DIAGNOSIS — M79.18 MYOFASCIAL MUSCLE PAIN: ICD-10-CM

## 2025-08-06 DIAGNOSIS — G47.11 IDIOPATHIC HYPERSOMNOLENCE: ICD-10-CM

## 2025-08-06 DIAGNOSIS — F41.1 GENERALIZED ANXIETY DISORDER: Chronic | ICD-10-CM

## 2025-08-06 DIAGNOSIS — F25.0 SCHIZOAFFECTIVE DISORDER, BIPOLAR TYPE: Chronic | ICD-10-CM

## 2025-08-06 DIAGNOSIS — F25.0 SCHIZOAFFECTIVE DISORDER, BIPOLAR TYPE: Primary | ICD-10-CM

## 2025-08-06 RX ORDER — INSULIN GLARGINE 100 [IU]/ML
16 INJECTION, SOLUTION SUBCUTANEOUS DAILY
Qty: 15 ML | Refills: 3 | OUTPATIENT
Start: 2025-08-06

## 2025-08-06 RX ORDER — BLOOD-GLUCOSE SENSOR
EACH MISCELLANEOUS
Qty: 3 EACH | Refills: 0 | Status: SHIPPED | OUTPATIENT
Start: 2025-08-06

## 2025-08-06 RX ORDER — DULOXETIN HYDROCHLORIDE 60 MG/1
60 CAPSULE, DELAYED RELEASE ORAL 2 TIMES DAILY
Qty: 60 CAPSULE | Refills: 1 | Status: SHIPPED | OUTPATIENT
Start: 2025-08-06 | End: 2025-10-05

## 2025-08-06 NOTE — TELEPHONE ENCOUNTER
Spoke with patient, informed her that Dr PARKER does not have any available appts and Dr More's next available is September 17th.  Advised her to try to schedule with an medical marijuana provider per the recommendations of Dr More at her last visit

## 2025-08-06 NOTE — TELEPHONE ENCOUNTER
Good Morning/Afternoon,     Pt is requesting for a refill of: cyclobenzaprine (FLEXERIL) 10 MG tablet   Last filed:05/27/2025  Last encounter:06/24/2025  Up coming apt: 08/14/2025  Pharmacy: [facility]     Medical Assistant  Shira TORRES (CCMA, Interventional Pain Management Surgery Scheduler)

## 2025-08-06 NOTE — TELEPHONE ENCOUNTER
Copied from CRM #[id number]. Topic: Medications - Medication Question  >> Aug 6, 2025 10:57 AM Anna Marie wrote:  .Type:  RX Refill Request    Who Called: Yolanda   Refill or New Rx:New RX   RX Name and Strength:blood-glucose sensor (DEXCOM G7 SENSOR) Mariela  How is the patient currently taking it? (ex. 1XDay):as prescribed   Is this a 30 day or 90 day RX:30 days   Preferred Pharmacy with phone number:.  [facility]  [address] [place] [id number]  Phone: [phone] Fax: [phone]  Local or Mail Order:local   Ordering Provider:   Would the patient rather a call back or a response via [facility]? Call   Best Call Back Number:.[phone]   Additional Information: Pt requesting a call back to discuss the medication that is being requested

## 2025-08-06 NOTE — TELEPHONE ENCOUNTER
The patient stated that her sugar is running really high, she would like to be seen sooner. She also would like to switch to Dexcom G7.   Please advise.

## 2025-08-06 NOTE — TELEPHONE ENCOUNTER
Copied from CRM #[id number]. Topic: General Inquiry - Patient Advice  >> Aug 6, 2025 11:15 AM Anna Marie wrote:  .Type:  Needs Medical Advice    Who Called: Yolanda   Symptoms (please be specific): High blood sugar    How long has patient had these symptoms:  3 months   Pharmacy name and phone #:  .  [facility]  [address]  [place] [address]  Phone: [phone] Fax: [phone]  Would the patient rather a call back or a response via [facility]? Call   Best Call Back Number: .[phone]   Additional Information: Pt requesting a call back to discuss issues

## 2025-08-06 NOTE — TELEPHONE ENCOUNTER
Copied from CRM #[id number]. Topic: Appointments - Appointment Access  >> Aug 6, 2025 12:03 PM Alana wrote:  Type: Appointment Access (office must schedule)    Who Called: Yolanda Betts    Reason for Visit: severe pain   When does the patient need to be seen?: asAP  Next Available Appointment: -  Would the patient rather a call back or a response via [facility]?    Best Call Back Number: [phone]    Additional Information:  pt needs to schedule appt please call

## 2025-08-06 NOTE — TELEPHONE ENCOUNTER
Spoke with pt on the phone . Pt is stating that her sugars have been running in the 300's and sometimes 400's for the last 4 months. Patient states she took her blood sugar fasting today and it was 298. Patient is requesting for provider to give her a phone call in urgent need to discuss sugars. Scheduled pt with the next available appointment but it's not until September 4. Patient is also in need of Dexcom G7 sensors. Pt states she is no longer using Dexcom G6. Informed pt that I'll get information sent over to provider.     Patients Dexcom report has been uploaded into media

## 2025-08-07 ENCOUNTER — TELEPHONE (OUTPATIENT)
Dept: DIABETES | Facility: CLINIC | Age: 55
End: 2025-08-07
Payer: MEDICARE

## 2025-08-07 ENCOUNTER — OFFICE VISIT (OUTPATIENT)
Dept: UROLOGY | Facility: CLINIC | Age: 55
End: 2025-08-07
Payer: MEDICARE

## 2025-08-07 ENCOUNTER — OFFICE VISIT (OUTPATIENT)
Dept: CARDIOLOGY | Facility: CLINIC | Age: 55
End: 2025-08-07
Payer: MEDICARE

## 2025-08-07 ENCOUNTER — CLINICAL SUPPORT (OUTPATIENT)
Dept: PULMONOLOGY | Facility: CLINIC | Age: 55
End: 2025-08-07
Payer: MEDICARE

## 2025-08-07 ENCOUNTER — LAB VISIT (OUTPATIENT)
Dept: LAB | Facility: HOSPITAL | Age: 55
End: 2025-08-07
Attending: NURSE PRACTITIONER
Payer: MEDICARE

## 2025-08-07 VITALS
SYSTOLIC BLOOD PRESSURE: 129 MMHG | HEART RATE: 89 BPM | RESPIRATION RATE: 16 BRPM | HEIGHT: 56 IN | BODY MASS INDEX: 45.48 KG/M2 | OXYGEN SATURATION: 97 % | WEIGHT: 202.19 LBS | DIASTOLIC BLOOD PRESSURE: 83 MMHG | HEART RATE: 89 BPM

## 2025-08-07 VITALS
WEIGHT: 201.94 LBS | DIASTOLIC BLOOD PRESSURE: 75 MMHG | HEART RATE: 84 BPM | BODY MASS INDEX: 45.27 KG/M2 | SYSTOLIC BLOOD PRESSURE: 114 MMHG | OXYGEN SATURATION: 97 %

## 2025-08-07 DIAGNOSIS — E87.5 HYPERKALEMIA: ICD-10-CM

## 2025-08-07 DIAGNOSIS — F25.0 SCHIZOAFFECTIVE DISORDER, BIPOLAR TYPE: ICD-10-CM

## 2025-08-07 DIAGNOSIS — I10 ESSENTIAL HYPERTENSION: ICD-10-CM

## 2025-08-07 DIAGNOSIS — J45.40 MODERATE PERSISTENT ASTHMA WITHOUT COMPLICATION: ICD-10-CM

## 2025-08-07 DIAGNOSIS — I10 HYPERTENSION COMPLICATING DIABETES: Primary | ICD-10-CM

## 2025-08-07 DIAGNOSIS — F31.9 BIPOLAR 1 DISORDER: ICD-10-CM

## 2025-08-07 DIAGNOSIS — Z79.4 TYPE 2 DIABETES MELLITUS WITH HYPERGLYCEMIA, WITH LONG-TERM CURRENT USE OF INSULIN: ICD-10-CM

## 2025-08-07 DIAGNOSIS — R94.2 RESTRICTIVE VENTILATORY DEFECT: ICD-10-CM

## 2025-08-07 DIAGNOSIS — E11.9 HYPERTENSION COMPLICATING DIABETES: Primary | ICD-10-CM

## 2025-08-07 DIAGNOSIS — F41.1 GENERALIZED ANXIETY DISORDER: ICD-10-CM

## 2025-08-07 DIAGNOSIS — R30.9 PAINFUL URINATION: ICD-10-CM

## 2025-08-07 DIAGNOSIS — R07.89 OTHER CHEST PAIN: ICD-10-CM

## 2025-08-07 DIAGNOSIS — E11.65 TYPE 2 DIABETES MELLITUS WITH HYPERGLYCEMIA, WITH LONG-TERM CURRENT USE OF INSULIN: ICD-10-CM

## 2025-08-07 DIAGNOSIS — Z98.890 HISTORY OF CORONARY ANGIOGRAM: ICD-10-CM

## 2025-08-07 DIAGNOSIS — R31.0 HEMATURIA, GROSS: Primary | ICD-10-CM

## 2025-08-07 DIAGNOSIS — E26.9 HYPERALDOSTERONISM: ICD-10-CM

## 2025-08-07 DIAGNOSIS — F25.0 SCHIZOAFFECTIVE DISORDER, BIPOLAR TYPE: Chronic | ICD-10-CM

## 2025-08-07 DIAGNOSIS — R31.0 HEMATURIA, GROSS: ICD-10-CM

## 2025-08-07 LAB
CREAT SERPL-MCNC: 0.8 MG/DL (ref 0.5–1.4)
GFR SERPLBLD CREATININE-BSD FMLA CKD-EPI: >60 ML/MIN/1.73/M2

## 2025-08-07 PROCEDURE — 3078F DIAST BP <80 MM HG: CPT | Mod: CPTII,S$GLB,, | Performed by: INTERNAL MEDICINE

## 2025-08-07 PROCEDURE — 82565 ASSAY OF CREATININE: CPT

## 2025-08-07 PROCEDURE — 4010F ACE/ARB THERAPY RXD/TAKEN: CPT | Mod: CPTII,S$GLB,, | Performed by: INTERNAL MEDICINE

## 2025-08-07 PROCEDURE — 3079F DIAST BP 80-89 MM HG: CPT | Mod: CPTII,S$GLB,, | Performed by: UROLOGY

## 2025-08-07 PROCEDURE — 3008F BODY MASS INDEX DOCD: CPT | Mod: CPTII,S$GLB,, | Performed by: INTERNAL MEDICINE

## 2025-08-07 PROCEDURE — 99999 PR PBB SHADOW E&M-EST. PATIENT-LVL V: CPT | Mod: PBBFAC,,,

## 2025-08-07 PROCEDURE — 1159F MED LIST DOCD IN RCRD: CPT | Mod: CPTII,S$GLB,, | Performed by: INTERNAL MEDICINE

## 2025-08-07 PROCEDURE — 99213 OFFICE O/P EST LOW 20 MIN: CPT | Mod: S$GLB,,, | Performed by: UROLOGY

## 2025-08-07 PROCEDURE — 3052F HG A1C>EQUAL 8.0%<EQUAL 9.0%: CPT | Mod: CPTII,S$GLB,, | Performed by: UROLOGY

## 2025-08-07 PROCEDURE — 4010F ACE/ARB THERAPY RXD/TAKEN: CPT | Mod: CPTII,S$GLB,, | Performed by: UROLOGY

## 2025-08-07 PROCEDURE — 99214 OFFICE O/P EST MOD 30 MIN: CPT | Mod: S$GLB,,, | Performed by: INTERNAL MEDICINE

## 2025-08-07 PROCEDURE — 36415 COLL VENOUS BLD VENIPUNCTURE: CPT

## 2025-08-07 PROCEDURE — 99999 PR PBB SHADOW E&M-EST. PATIENT-LVL IV: CPT | Mod: PBBFAC,,, | Performed by: INTERNAL MEDICINE

## 2025-08-07 PROCEDURE — 3052F HG A1C>EQUAL 8.0%<EQUAL 9.0%: CPT | Mod: CPTII,S$GLB,, | Performed by: INTERNAL MEDICINE

## 2025-08-07 PROCEDURE — 99999 PR PBB SHADOW E&M-EST. PATIENT-LVL IV: CPT | Mod: PBBFAC,,, | Performed by: UROLOGY

## 2025-08-07 PROCEDURE — 3074F SYST BP LT 130 MM HG: CPT | Mod: CPTII,S$GLB,, | Performed by: INTERNAL MEDICINE

## 2025-08-07 PROCEDURE — 3074F SYST BP LT 130 MM HG: CPT | Mod: CPTII,S$GLB,, | Performed by: UROLOGY

## 2025-08-07 RX ORDER — METOPROLOL SUCCINATE 50 MG/1
50 TABLET, EXTENDED RELEASE ORAL EVERY MORNING
Qty: 90 TABLET | Refills: 3 | Status: SHIPPED | OUTPATIENT
Start: 2025-08-07

## 2025-08-07 RX ORDER — BUDESONIDE AND FORMOTEROL FUMARATE DIHYDRATE 160; 4.5 UG/1; UG/1
2 AEROSOL RESPIRATORY (INHALATION) EVERY 12 HOURS
Qty: 10.2 G | Refills: 6 | Status: SHIPPED | OUTPATIENT
Start: 2025-08-07

## 2025-08-07 RX ORDER — ALBUTEROL SULFATE 90 UG/1
2 INHALANT RESPIRATORY (INHALATION) EVERY 6 HOURS PRN
Qty: 18 G | Refills: 0 | Status: SHIPPED | OUTPATIENT
Start: 2025-08-07 | End: 2026-08-07

## 2025-08-07 RX ORDER — SPIRONOLACTONE 25 MG/1
TABLET ORAL
Qty: 90 TABLET | Refills: 3 | Status: SHIPPED | OUTPATIENT
Start: 2025-08-07

## 2025-08-07 RX ORDER — VALSARTAN 80 MG/1
80 TABLET ORAL DAILY
Qty: 30 TABLET | Refills: 11 | Status: SHIPPED | OUTPATIENT
Start: 2025-08-07 | End: 2026-08-07

## 2025-08-07 RX ORDER — CYCLOBENZAPRINE HCL 10 MG
5-10 TABLET ORAL 2 TIMES DAILY PRN
Qty: 90 TABLET | Refills: 0 | Status: SHIPPED | OUTPATIENT
Start: 2025-08-07

## 2025-08-07 RX ORDER — AMLODIPINE BESYLATE 5 MG/1
5 TABLET ORAL DAILY
Qty: 30 TABLET | Refills: 11 | Status: SHIPPED | OUTPATIENT
Start: 2025-08-07 | End: 2026-08-07

## 2025-08-07 RX ORDER — FUROSEMIDE 20 MG/1
20 TABLET ORAL DAILY
Qty: 90 TABLET | Refills: 3 | Status: SHIPPED | OUTPATIENT
Start: 2025-08-07 | End: 2026-08-07

## 2025-08-07 RX ORDER — LAMOTRIGINE 100 MG/1
100 TABLET ORAL 2 TIMES DAILY
Qty: 60 TABLET | Refills: 1 | Status: SHIPPED | OUTPATIENT
Start: 2025-08-07 | End: 2025-10-06

## 2025-08-07 NOTE — PROGRESS NOTES
Subjective:   Patient ID:  08/07/2025      Yolanda Betts is a 55 y.o. female who presents for evaluation of Edema and Shortness of Breath      History of Present Illness    CHIEF COMPLAINT:  Patient presents today for LLE swelling.    HISTORY OF PRESENT ILLNESS:  She reports recent LLE swelling extending from ankle to leg. She recently sprained her ankle which continues to cause pain. She endorses dyspnea. She denies starting any new medications recently and has not increased salt intake.    MEDICAL HISTORY:  She has a history of a fall in June when she slipped out of a vehicle with high blood sugar. She underwent normal cardiac catheterization in September last year. She was diagnosed with diabetic neuropathy causing weakness and dizziness. CT head and carotid duplex were unremarkable. She has a history of seizure and subdural hematoma at the end of last year. She denies current complications related to these prior medical events.    MEDICATIONS:  She takes amlodipine 10 mg, Lasix 20 mg daily, spironolactone, and metoprolol at night. She also takes vitamins due to recent malnutrition diagnosis including folic acid, melatonin, multivitamins, and vitamin D3.      ROS:  ROS findings as noted in HPI.         HPI    Past Medical History:   Diagnosis Date    Abnormal Pap smear of cervix     HPV genital warts    Anemia     Anxiety     Arthritis     Asthma 10/11/2016    Bipolar 1 disorder     Diabetes mellitus, type 2     Dyslipidemia associated with type 2 diabetes mellitus 05/13/2019    Dyspnea due to COVID-19 09/12/2024    General anesthetics causing adverse effect in therapeutic use     Genital warts     GERD (gastroesophageal reflux disease)     Herpes simplex virus (HSV) infection     Hyperlipidemia     Hypertension     Hypertension complicating diabetes 05/05/2019    Migraine with aura and without status migrainosus, not intractable 03/21/2016    Mild persistent asthma without complication 10/11/2016     Morbid obesity with body mass index (BMI) of 45.0 to 49.9 in adult 2017    Myoclonus     Obstructive sleep apnea     ALEN (obstructive sleep apnea)     2L per N/C q HS    Schizoaffective disorder, bipolar type 2019    Seasonal allergic rhinitis due to pollen 2019    Seizures     Type 2 diabetes mellitus with hyperglycemia, with long-term current use of insulin 2017    Type 2 diabetes mellitus with hyperglycemia, without long-term current use of insulin 2017       Past Surgical History:   Procedure Laterality Date    abcess removed right back      ANGIOGRAM, CORONARY, WITH LEFT HEART CATHETERIZATION N/A 2024    Procedure: Angiogram, Coronary, with Left Heart Cath;  Surgeon: Jaimie Wills MD;  Location: [facility] CATH LAB;  Service: Cardiology;  Laterality: N/A;    BELT ABDOMINOPLASTY      BREAST SURGERY Bilateral     Reduction    CARPAL TUNNEL RELEASE Bilateral 2023    Procedure: RELEASE, CARPAL TUNNEL;  Surgeon: Neville Roth MD;  Location: [facility] OR;  Service: Orthopedics;  Laterality: Bilateral;  bilateral carpal tunnel release     SECTION      X 2    COLONOSCOPY      COLONOSCOPY N/A 2018    Procedure: colonoscopy for iron deficiency anemia;  Surgeon: Frankie Sanchez MD;  Location: [facility];  Service: Endoscopy;  Laterality: N/A;    COLONOSCOPY N/A 2018    Procedure: COLONOSCOPY;  Surgeon: Frankie Sanchez MD;  Location: [facility];  Service: Endoscopy;  Laterality: N/A;    COLONOSCOPY N/A 3/10/2023    Procedure: COLONOSCOPY;  Surgeon: Lalita Montes De Oca MD;  Location: [facility];  Service: Endoscopy;  Laterality: N/A;    COLONOSCOPY N/A 2024    Procedure: COLONOSCOPY;  Surgeon: Tara Og MD;  Location: [facility];  Service: Endoscopy;  Laterality: N/A;    EPIDURAL STEROID INJECTION INTO CERVICAL SPINE N/A 2023    Procedure: C6/7 IL ROCAEL RN IV Sedation;  Surgeon: Otto Phillips MD;  Location: [facility] MGT;  Service:  "Pain Management;  Laterality: N/A;    EPIDURAL STEROID INJECTION INTO CERVICAL SPINE N/A 1/30/2024    Procedure: C5/6 IL ROCAEL;  Surgeon: Otto Phillips MD;  Location: [facility] PAIN MGT;  Service: Pain Management;  Laterality: N/A;    ESOPHAGOGASTRODUODENOSCOPY N/A 3/10/2023    Procedure: EGD (ESOPHAGOGASTRODUODENOSCOPY);  Surgeon: Lalita Montes De Oca MD;  Location: [facility] ENDO;  Service: Endoscopy;  Laterality: N/A;    HYSTERECTOMY      w/ BSO; hypermenorrhea    INJECTION OF ANESTHETIC AGENT AROUND MEDIAL BRANCH NERVES INNERVATING CERVICAL FACET JOINT Bilateral 12/19/2023    Procedure: Bilateral C5-7 MBB (diagnostic);  Surgeon: Otto Phillips MD;  Location: [facility] PAIN MGT;  Service: Pain Management;  Laterality: Bilateral;    MOUTH SURGERY  1996    OOPHORECTOMY      hyst/bso, hypermenorrhea    ROBOT-ASSISTED CHOLECYSTECTOMY USING DA DARI XI N/A 1/3/2020    Procedure: XI ROBOTIC CHOLECYSTECTOMY;  Surgeon: Damir Driscoll MD;  Location: [facility] OR;  Service: General;  Laterality: N/A;    TOTAL REDUCTION MAMMOPLASTY      TUBAL LIGATION         Social History[1]    Family History   Problem Relation Name Age of Onset    Diabetes Mother      Hyperlipidemia Mother      Hypertension Mother      Asthma Mother      COPD Mother      Glaucoma Mother      Thyroid disease Mother      Anesthesia problems Mother          "almost had a cardiac arrest" , blood clots    Hypertension Father      Hyperlipidemia Father      Cancer Father          Brain, lung, liver, kidney    No Known Problems Sister      Hypertension Brother      Alcohol abuse Brother      Heart disease Maternal Grandmother      Hyperlipidemia Maternal Grandmother      Hypertension Maternal Grandmother      Cataracts Maternal Grandmother      Diabetes Maternal Grandmother      Heart disease Maternal Grandfather      Hyperlipidemia Maternal Grandfather      Hypertension Maternal Grandfather      Glaucoma Maternal Grandfather      Cancer Maternal Grandfather      " "Cataracts Maternal Grandfather      Macular degeneration Maternal Grandfather      Diabetes Maternal Grandfather      Heart disease Paternal Grandmother      Hyperlipidemia Paternal Grandmother      Hypertension Paternal Grandmother      Cataracts Paternal Grandmother      Heart disease Paternal Grandfather      Hyperlipidemia Paternal Grandfather      Hypertension Paternal Grandfather      Cataracts Paternal Grandfather      Breast cancer Maternal Cousin      Breast cancer Maternal Cousin      Breast cancer Maternal Cousin      Breast cancer Maternal Cousin           ROS    Current Outpatient Medications on File Prior to Visit   Medication Sig    albuterol (PROVENTIL) 2.5 mg /3 mL (0.083 %) nebulizer solution Take 2.5 mg by nebulization every 4 (four) hours as needed. Times a day    ALPRAZolam (XANAX) 1 MG tablet Take 1 tablet (1 mg total) by mouth daily as needed for Anxiety.    atorvastatin (LIPITOR) 20 MG tablet Take 1 tablet (20 mg total) by mouth every evening    BD INSULIN SYRINGE ULTRA-FINE 1/2 mL 30 gauge x 1/2" Syrg     blood-glucose sensor (DEXCOM G7 SENSOR) Mariela Use as directed for continuous glucose monitoring- change every 10 days    clotrimazole-betamethasone 1-0.05% (LOTRISONE) cream Apply topically 2 (two) times daily.    cyclobenzaprine (FLEXERIL) 10 MG tablet Take ½ to 1 tablet by mouth 2 (two) times daily as needed for Muscle spasms. May cause drowsiness.    diclofenac sodium (VOLTAREN) 1 % Gel Apply 2 g topically 3 (three) times daily as needed (pain)    DULoxetine (CYMBALTA) 60 MG capsule Take 1 capsule (60 mg total) by mouth 2 (two) times daily.    ergocalciferol (ERGOCALCIFEROL) 50,000 unit Cap Take 1 capsule (50,000 Units total) by mouth every 7 days.    estradioL (ESTRACE) 0.01 % (0.1 mg/gram) vaginal cream Place 1 g vaginally twice a week.    fenofibrate (TRICOR) 145 MG tablet Take 1 tablet by mouth in the morning.    fluticasone propionate (FLONASE) 50 mcg/actuation nasal spray Take 1 " "spray by Each Nostril route in the morning.    folic acid (FOLVITE) 1 MG tablet Take 1 tablet by mouth every day in the morning    glucagon (BAQSIMI) 3 mg/actuation [place] SPRAY 1 SPRAY IN NOSTRIL AS NEEDED FOR EMERGENCY. MAY REPEAT 1 TIME AFTER 15 MINUTES    haloperidoL (HALDOL) 2 MG tablet Take 1 tablet (2 mg total) by mouth 2 (two) times daily.    hydrocortisone (ANUSOL-HC) 2.5 % rectal cream Place rectally once daily for 10 days    insulin aspart U-100 (NOVOLOG FLEXPEN U-100 INSULIN) 100 unit/mL (3 mL) InPn pen Inject 9 Units into the skin 3 (three) times daily before meals. Use sliding scale for small medium and large meals    insulin aspart U-100 (NOVOLOG U-100 INSULIN ASPART) 100 unit/mL injection To use via insulin pump. Up to 200 units every 2 days    insulin glargine U-100, Lantus, (LANTUS SOLOSTAR U-100 INSULIN) 100 unit/mL (3 mL) InPn pen Inject 16 Units into the skin once daily. Use in the event of pump failure    insulin pump cart,auto,BT,G6/7 (OMNIPOD 5 G6-G7 PODS, GEN 5,) Crtg 1 each by Misc.(Non-Drug; Combo Route) route every 48 hours.    insulin syringe-needle U-100 0.3 mL 30 gauge x 5/16" Syrg 1 each by Misc.(Non-Drug; Combo Route) route Every 3 (three) days.    ixekizumab (TALTZ AUTOINJECTOR) 80 mg/mL AtIn Inject 80 mg into the skin every 28 days.    lamoTRIgine (LAMICTAL) 100 MG tablet Take 1 tablet (100 mg total) by mouth 2 (two) times daily.    levETIRAcetam (KEPPRA) 750 MG Tab Take 1 tablet by mouth in the morning and 1 tablet before bedtime    levocetirizine (XYZAL) 5 MG tablet Take 1 tablet (5 mg total) by mouth every evening.    LIDOcaine (LIDODERM) 5 % Place 2 patches onto the skin every 12 (twelve) hours as needed (pain). Remove & Discard patch within 12 hours or as directed by MD    lifitegrast (XIIDRA) 5 % Dpet Place 1 drop into both eyes 2 (two) times daily.    linaCLOtide (LINZESS) 145 mcg Cap capsule Take 1 capsule (145 mcg total) by mouth daily as needed (for constipation).    " "magnesium oxide (MAG-OX) 400 mg (241.3 mg magnesium) tablet Take 1 tablet (400 mg total) by mouth once daily.    melatonin (MELATIN) 3 mg tablet Take 2 tablets (6 mg total) by mouth nightly.    methylPREDNISolone (MEDROL DOSEPACK) 4 mg tablet use as directed    omeprazole (PRILOSEC) 40 MG capsule Take 1 capsule (40 mg total) by mouth once daily.    ondansetron (ZOFRAN) 4 MG tablet Take 4 mg by mouth every 6 (six) hours.    ondansetron (ZOFRAN) 4 MG tablet Take 1 tablet (4 mg total) by mouth every 6 (six) hours.    OZEMPIC 2 mg/dose (8 mg/3 mL) PnIj Inject 2 mg into the skin every 7 days.    pen needle, diabetic (BD ULTRA-FINE MINI PEN NEEDLE) 31 gauge x 3/16" Ndle Use 1 a day in event of pump failure    polyethylene glycol (GLYCOLAX) 17 gram/dose powder Take 17 g by mouth 2 (two) times daily.    terconazole (TERAZOL 7) 0.4 % Crea Place 1 applicator vaginally once daily.    [DISCONTINUED] amLODIPine (NORVASC) 10 MG tablet Take 1 tablet (10 mg total) by mouth once daily.    [DISCONTINUED] furosemide (LASIX) 20 MG tablet Take 1 tablet (20 mg total) by mouth once daily.    [DISCONTINUED] metoprolol succinate (TOPROL-XL) 50 MG 24 hr tablet Take 1 tablet (50 mg total) by mouth every morning.    [DISCONTINUED] spironolactone (ALDACTONE) 25 MG tablet Take 1 tablet by mouth every morning    [DISCONTINUED] clonazePAM (KLONOPIN) 1 MG tablet Take 1 tablet by mouth daily    [DISCONTINUED] glucagon, human recombinant, (GLUCAGON EMERGENCY KIT, HUMAN,) 1 mg SolR Inject 1 mg into the muscle as needed. Emergency use     Current Facility-Administered Medications on File Prior to Visit   Medication    triamcinolone acetonide injection 40 mg       Objective:   Objective:  Wt Readings from Last 3 Encounters:   08/07/25 91.6 kg (201 lb 15.1 oz)   07/01/25 90.7 kg (200 lb)   06/24/25 92.6 kg (204 lb 2.3 oz)     Temp Readings from Last 3 Encounters:   06/20/25 98 °F (36.7 °C)   06/13/25 98.7 °F (37.1 °C) (Oral)   06/07/25 98.1 °F (36.7 °C) " (Oral)     BP Readings from Last 3 Encounters:   08/07/25 114/75   06/24/25 126/80   06/20/25 136/79     Pulse Readings from Last 3 Encounters:   08/07/25 84   06/24/25 90   06/20/25 80       Physical Exam    General: No acute distress. Well-developed. Well-nourished.  Eyes: EOMI. Sclerae anicteric.  HENT: Normocephalic. Atraumatic. Nares patent. Moist oral mucosa.  Ears: Bilateral TMs clear. Bilateral EACs clear.  Cardiovascular: Regular rate. Regular rhythm. No murmurs. No rubs. No gallops. Normal S1, S2.  Respiratory: Normal respiratory effort. Clear to auscultation bilaterally. No rales. No rhonchi. No wheezing.  Abdomen: Soft. Non-tender. Non-distended. Normoactive bowel sounds.  Musculoskeletal: No  obvious deformity.  Extremities: No lower extremity edema.  Neurological: Alert & oriented x3. No slurred speech. Normal gait.  Psychiatric: Normal mood. Normal affect. Good insight. Good judgment.  Skin: Warm. Dry. No rash.         Physical Exam      Lab Results   Component Value Date    CHOL 179 11/30/2024    CHOL 209 (H) 04/29/2024    CHOL 107 (L) 08/08/2023     Lab Results   Component Value Date    LDLCALC 91.6 11/30/2024    LDLCALC 123 (H) 04/29/2024    LDLCALC 43.4 (L) 08/08/2023         Chemistry        Component Value Date/Time     (L) 06/13/2025 1700     (L) 03/16/2025 1119    K 4.0 06/13/2025 1700    K 5.0 03/16/2025 1119     06/13/2025 1700     03/16/2025 1119    CO2 21 (L) 06/13/2025 1700    CO2 19 (L) 03/16/2025 1119    BUN 13 06/13/2025 1700    CREATININE 0.7 06/13/2025 1700     (H) 06/13/2025 1700     (H) 03/16/2025 1119        Component Value Date/Time    CALCIUM 9.8 06/13/2025 1700    CALCIUM 9.0 03/16/2025 1119    ALKPHOS 109 06/13/2025 1700    ALKPHOS 116 03/16/2025 1119    AST 18 06/13/2025 1700    AST 14 03/16/2025 1119    ALT 25 06/13/2025 1700    ALT 24 03/16/2025 1119    BILITOT 0.2 06/13/2025 1700    BILITOT 0.1 03/16/2025 1119    ESTGFRAFRICA >60.0  03/08/2022 0735    EGFRNONAA >60.0 03/08/2022 0735          Lab Results   Component Value Date    TSH 1.703 04/12/2025       Lab Results   Component Value Date    WBC 9.46 06/13/2025    HGB 10.6 (L) 06/13/2025    HCT 36.0 (L) 06/13/2025    MCV 79 (L) 06/13/2025     06/13/2025       @LABRCNTIP(BNP,BNPTRIAGEBLO)@       Imaging:  ======    No results found for this or any previous visit.    No results found for this or any previous visit.    Results for orders placed during the hospital encounter of 04/01/25    X-Ray Chest PA And Lateral    Narrative  EXAMINATION:  XR CHEST PA AND LATERAL    CLINICAL HISTORY:  Other forms of dyspnea    TECHNIQUE:  PA and lateral views of the chest were performed.    COMPARISON:  03/16/2025    FINDINGS:  The lungs are clear and free of infiltrate.  No pleural effusion or pneumothorax. The heart is enlarged.  There is tortuosity of the descending thoracic aorta.    Impression  1.  No acute cardiopulmonary process.      Electronically signed by: Nimesh Martinez DO  Date:    04/01/2025  Time:    09:07      No results found for this or any previous visit.      No valid procedures specified.        No results found for this or any previous visit.      Results for orders placed during the hospital encounter of 11/30/24    Echo    Interpretation Summary    Left Ventricle: The left ventricle is normal in size. Ventricular mass is normal. Normal wall thickness. There is concentric remodeling. Normal wall motion. There is normal systolic function with a visually estimated ejection fraction of 60 - 65%. Ejection fraction is approximately 65%. There is normal diastolic function.    Right Ventricle: Normal right ventricular cavity size. Wall thickness is normal. Systolic function is normal.    IVC/SVC: Normal venous pressure at 3 mmHg.      Results for orders placed during the hospital encounter of 09/27/24    Cardiac catheterization    Conclusion    The left ventricular end diastolic pressure  was elevated.    The pre-procedure left ventricular end diastolic pressure was 22.    The estimated blood loss was none.    The coronary arteries were normal..    There was diastolic dysfunction.    The filling pressures on the left were mildly elevated.    The procedure log was documented by Documenter: Elizabeth Rojas RN and verified by Jaimie Wills MD.    Date: 9/27/2024  Time: 10:17 AM      Lab Results   Component Value Date    HGBA1C 9.0 (H) 03/18/2025         The 10-year ASCVD risk score (Samuel CUEVAS, et al., 2019) is: 7.3%    Values used to calculate the score:      Age: 55 years      Sex: Female      Is Non- : Yes      Diabetic: Yes      Tobacco smoker: No      Systolic Blood Pressure: 114 mmHg      Is BP treated: Yes      HDL Cholesterol: 46 mg/dL      Total Cholesterol: 179 mg/dL    Diagnostic Results:  ECG: Reviewed    Assessment and Plan:   Hypertension complicating diabetes    Essential hypertension  -     amLODIPine (NORVASC) 5 MG tablet; Take 1 tablet (5 mg total) by mouth once daily.  Dispense: 30 tablet; Refill: 11  -     furosemide (LASIX) 20 MG tablet; Take 1 tablet (20 mg total) by mouth once daily.  Dispense: 90 tablet; Refill: 3  -     valsartan (DIOVAN) 80 MG tablet; Take 1 tablet (80 mg total) by mouth once daily.  Dispense: 30 tablet; Refill: 11    Other chest pain  -     metoprolol succinate (TOPROL-XL) 50 MG 24 hr tablet; Take 1 tablet (50 mg total) by mouth every morning.  Dispense: 90 tablet; Refill: 3    Hyperaldosteronism  -     furosemide (LASIX) 20 MG tablet; Take 1 tablet (20 mg total) by mouth once daily.  Dispense: 90 tablet; Refill: 3    Hyperkalemia  -     furosemide (LASIX) 20 MG tablet; Take 1 tablet (20 mg total) by mouth once daily.  Dispense: 90 tablet; Refill: 3    History of coronary angiogram    Bipolar 1 disorder    Type 2 diabetes mellitus with hyperglycemia, with long-term current use of insulin    Generalized anxiety  disorder    Schizoaffective disorder, bipolar type    Other orders  -     spironolactone (ALDACTONE) 25 MG tablet; Take 1 tablet by mouth every morning  Dispense: 90 tablet; Refill: 3        Assessment & Plan    F25.0 Schizoaffective disorder, bipolar type  F31.9 Bipolar 1 disorder  I10 Essential hypertension  R07.89 Other chest pain  E26.9 Hyperaldosteronism  E87.5 Hyperkalemia  E11.9, I10 Hypertension complicating diabetes  Z98.890 History of coronary angiogram  E11.65, Z79.4 Type 2 diabetes mellitus with hyperglycemia, with long-term current use of insulin  F41.1 Generalized anxiety disorder    Leg swelling likely due to amlodipine (vasodilator) rather than fluid retention; clarified this is vein engorgement and more of a cosmetic issue than a health problem.  Adjusted blood pressure medication regimen: Decreased Amlodipine from 10 mg to 5 mg daily and started Valsartan to maintain blood pressure control while potentially reducing leg swelling.    I10 ESSENTIAL HYPERTENSION:  - Raise legs to help with swelling, wear compression socks (longer socks) to manage leg swelling, reduce salt intake.  - Continued Lasix 20 mg daily.  - Continued Metoprolol at night.  - Refilled Amlodipine, Lasix, and Metoprolol.  - Follow up in 6 months.    E26.9 HYPERALDOSTERONISM:  - Continued Spironolactone.  - Refilled Spironolactone.  - Follow up in 6 months.    E11.9, I10 HYPERTENSION COMPLICATING DIABETES:  - Raise legs to help with swelling, wear compression socks (longer socks) to manage leg swelling, reduce salt intake.  - Continued Lasix 20 mg daily.  - Continued Metoprolol at night.  - Refilled Amlodipine, Lasix, and Metoprolol.  - Follow up in 6 months.             Healthy weight goals were discussed in clinic  Reviewed all tests and above medical conditions with patient in detail and formulated treatment plan.    Follow up in 6 months         [1]   Social History  Tobacco Use    Smoking status: Never     Passive exposure:  Never    Smokeless tobacco: Never   Substance Use Topics    Alcohol use: Not Currently     Comment: socially  No alcohol 72h prior to sx    Drug use: No

## 2025-08-07 NOTE — PROGRESS NOTES
Chief Complaint:   Encounter Diagnoses   Name Primary?    Hematuria, gross Yes    Painful urination        HPI:  HPI Yolanda Betts inga 55 y.o. female who presents with 2 issues.  She was seeing Carmen for some gross hematuria that has been intermittent for 2 years.  She also has pain with urination.  This is also been going on for a couple years.  She was intended to have a CT urogram but it was never scheduled.  She did have a CT in 2024 and 2022.  Both were independently reviewed.  Patient does have a history of ankylosing spondylitis in his treated by rheumatology.  No history of reiters.    History:  Social History[1]  Past Medical History:   Diagnosis Date    Abnormal Pap smear of cervix     HPV genital warts    Anemia     Anxiety     Arthritis     Asthma 10/11/2016    Bipolar 1 disorder     Diabetes mellitus, type 2     Dyslipidemia associated with type 2 diabetes mellitus 05/13/2019    Dyspnea due to COVID-19 09/12/2024    General anesthetics causing adverse effect in therapeutic use     Genital warts     GERD (gastroesophageal reflux disease)     Herpes simplex virus (HSV) infection     Hyperlipidemia     Hypertension     Hypertension complicating diabetes 05/05/2019    Migraine with aura and without status migrainosus, not intractable 03/21/2016    Mild persistent asthma without complication 10/11/2016    Morbid obesity with body mass index (BMI) of 45.0 to 49.9 in adult 08/03/2017    Myoclonus     Obstructive sleep apnea     ALEN (obstructive sleep apnea)     2L per N/C q HS    Schizoaffective disorder, bipolar type 04/25/2019    Seasonal allergic rhinitis due to pollen 05/13/2019    Seizures     Type 2 diabetes mellitus with hyperglycemia, with long-term current use of insulin 12/21/2017    Type 2 diabetes mellitus with hyperglycemia, without long-term current use of insulin 12/21/2017     Past Surgical History:   Procedure Laterality Date    abcess removed right back      ANGIOGRAM, CORONARY, WITH  LEFT HEART CATHETERIZATION N/A 2024    Procedure: Angiogram, Coronary, with Left Heart Cath;  Surgeon: Jaimie Wills MD;  Location: [facility] CATH LAB;  Service: Cardiology;  Laterality: N/A;    BELT ABDOMINOPLASTY  1996    BREAST SURGERY Bilateral     Reduction    CARPAL TUNNEL RELEASE Bilateral 2023    Procedure: RELEASE, CARPAL TUNNEL;  Surgeon: Neville Roth MD;  Location: [facility] OR;  Service: Orthopedics;  Laterality: Bilateral;  bilateral carpal tunnel release     SECTION      X 2    COLONOSCOPY      COLONOSCOPY N/A 2018    Procedure: colonoscopy for iron deficiency anemia;  Surgeon: Frankie Sanchez MD;  Location: [facility];  Service: Endoscopy;  Laterality: N/A;    COLONOSCOPY N/A 2018    Procedure: COLONOSCOPY;  Surgeon: Frankie Sanchez MD;  Location: [facility];  Service: Endoscopy;  Laterality: N/A;    COLONOSCOPY N/A 3/10/2023    Procedure: COLONOSCOPY;  Surgeon: Lalita Montes De Oca MD;  Location: [facility];  Service: Endoscopy;  Laterality: N/A;    COLONOSCOPY N/A 2024    Procedure: COLONOSCOPY;  Surgeon: Tara Og MD;  Location: [facility];  Service: Endoscopy;  Laterality: N/A;    EPIDURAL STEROID INJECTION INTO CERVICAL SPINE N/A 2023    Procedure: C6/7 IL ROCAEL RN IV Sedation;  Surgeon: Otto Phillips MD;  Location: [facility] PAIN MGT;  Service: Pain Management;  Laterality: N/A;    EPIDURAL STEROID INJECTION INTO CERVICAL SPINE N/A 2024    Procedure: C5/6 IL ROCAEL;  Surgeon: Otto Phillips MD;  Location: [facility] PAIN MGT;  Service: Pain Management;  Laterality: N/A;    ESOPHAGOGASTRODUODENOSCOPY N/A 3/10/2023    Procedure: EGD (ESOPHAGOGASTRODUODENOSCOPY);  Surgeon: Lalita Montes De Oca MD;  Location: [facility];  Service: Endoscopy;  Laterality: N/A;    HYSTERECTOMY      w/ BSO; hypermenorrhea    INJECTION OF ANESTHETIC AGENT AROUND MEDIAL BRANCH NERVES INNERVATING CERVICAL FACET JOINT Bilateral 2023    Procedure: Bilateral C5-7 MBB  "(diagnostic);  Surgeon: Otto Phillips MD;  Location: [facility] PAIN MGT;  Service: Pain Management;  Laterality: Bilateral;    MOUTH SURGERY  1996    OOPHORECTOMY      hyst/bso, hypermenorrhea    ROBOT-ASSISTED CHOLECYSTECTOMY USING DA DARI XI N/A 1/3/2020    Procedure: XI ROBOTIC CHOLECYSTECTOMY;  Surgeon: Damir Driscoll MD;  Location: [facility] OR;  Service: General;  Laterality: N/A;    TOTAL REDUCTION MAMMOPLASTY      TUBAL LIGATION       Family History   Problem Relation Name Age of Onset    Diabetes Mother      Hyperlipidemia Mother      Hypertension Mother      Asthma Mother      COPD Mother      Glaucoma Mother      Thyroid disease Mother      Anesthesia problems Mother          "almost had a cardiac arrest" , blood clots    Hypertension Father      Hyperlipidemia Father      Cancer Father          Brain, lung, liver, kidney    No Known Problems Sister      Hypertension Brother      Alcohol abuse Brother      Heart disease Maternal Grandmother      Hyperlipidemia Maternal Grandmother      Hypertension Maternal Grandmother      Cataracts Maternal Grandmother      Diabetes Maternal Grandmother      Heart disease Maternal Grandfather      Hyperlipidemia Maternal Grandfather      Hypertension Maternal Grandfather      Glaucoma Maternal Grandfather      Cancer Maternal Grandfather      Cataracts Maternal Grandfather      Macular degeneration Maternal Grandfather      Diabetes Maternal Grandfather      Heart disease Paternal Grandmother      Hyperlipidemia Paternal Grandmother      Hypertension Paternal Grandmother      Cataracts Paternal Grandmother      Heart disease Paternal Grandfather      Hyperlipidemia Paternal Grandfather      Hypertension Paternal Grandfather      Cataracts Paternal Grandfather      Breast cancer Maternal Cousin      Breast cancer Maternal Cousin      Breast cancer Maternal Cousin      Breast cancer Maternal Cousin         Medications Ordered Prior to Encounter[2]     Objective: " "    Vitals:    08/07/25 1401   BP: 129/83   Pulse: 89      BMI Readings from Last 1 Encounters:   08/07/25 45.32 kg/m²          Physical Exam  No acute distress alert and oriented   Respirations even unlabored   Abdomen is obese    Lab Results   Component Value Date    CREATININE 0.8 08/07/2025      Assessment:       1. Hematuria, gross    2. Painful urination        Plan:     1. Hematuria, gross    2. Painful urination       Orders Placed This Encounter    CREATININE, SERUM     Discussed proceeding with cystoscopy or awaiting CT results.  Patient would like to proceed with CT scan and return for possible cystoscopy.  Discuss that her diagnosis of ankylosing spondylitis may be the cause of her urinary pain, could she have urethritis associated with Reiters? Plan cystoscopy on return       [1]   Social History  Tobacco Use    Smoking status: Never     Passive exposure: Never    Smokeless tobacco: Never   Substance Use Topics    Alcohol use: Not Currently     Comment: socially  No alcohol 72h prior to sx    Drug use: No   [2]   Current Outpatient Medications on File Prior to Visit   Medication Sig Dispense Refill    albuterol (PROVENTIL) 2.5 mg /3 mL (0.083 %) nebulizer solution Take 2.5 mg by nebulization every 4 (four) hours as needed. Times a day 75 mL 11    ALPRAZolam (XANAX) 1 MG tablet Take 1 tablet (1 mg total) by mouth daily as needed for Anxiety. 30 tablet 0    atorvastatin (LIPITOR) 20 MG tablet Take 1 tablet (20 mg total) by mouth every evening 90 tablet 3    BD INSULIN SYRINGE ULTRA-FINE 1/2 mL 30 gauge x 1/2" Syrg   0    blood-glucose sensor (DEXCOM G7 SENSOR) Mariela Use as directed for continuous glucose monitoring- change every 10 days 3 each 0    clotrimazole-betamethasone 1-0.05% (LOTRISONE) cream Apply topically 2 (two) times daily. 45 g 1    cyclobenzaprine (FLEXERIL) 10 MG tablet Take ½ to 1 tablet by mouth 2 (two) times daily as needed for Muscle spasms. May cause drowsiness. 90 tablet 0    " "diclofenac sodium (VOLTAREN) 1 % Gel Apply 2 g topically 3 (three) times daily as needed (pain) 100 g 0    DULoxetine (CYMBALTA) 60 MG capsule Take 1 capsule (60 mg total) by mouth 2 (two) times daily. 60 capsule 1    ergocalciferol (ERGOCALCIFEROL) 50,000 unit Cap Take 1 capsule (50,000 Units total) by mouth every 7 days. 4 capsule 6    estradioL (ESTRACE) 0.01 % (0.1 mg/gram) vaginal cream Place 1 g vaginally twice a week. 42.5 g 11    fenofibrate (TRICOR) 145 MG tablet Take 1 tablet by mouth in the morning. 30 tablet 5    fluticasone propionate (FLONASE) 50 mcg/actuation nasal spray Take 1 spray by Each Nostril route in the morning. 16 g 3    folic acid (FOLVITE) 1 MG tablet Take 1 tablet by mouth every day in the morning 30 tablet 5    glucagon (BAQSIMI) 3 mg/actuation [place] SPRAY 1 SPRAY IN NOSTRIL AS NEEDED FOR EMERGENCY. MAY REPEAT 1 TIME AFTER 15 MINUTES 2 each 1    haloperidoL (HALDOL) 2 MG tablet Take 1 tablet (2 mg total) by mouth 2 (two) times daily. 60 tablet 2    hydrocortisone (ANUSOL-HC) 2.5 % rectal cream Place rectally once daily for 10 days 30 g 2    insulin aspart U-100 (NOVOLOG FLEXPEN U-100 INSULIN) 100 unit/mL (3 mL) InPn pen Inject 9 Units into the skin 3 (three) times daily before meals. Use sliding scale for small medium and large meals 15 mL 3    insulin aspart U-100 (NOVOLOG U-100 INSULIN ASPART) 100 unit/mL injection To use via insulin pump. Up to 200 units every 2 days 30 mL 0    insulin glargine U-100, Lantus, (LANTUS SOLOSTAR U-100 INSULIN) 100 unit/mL (3 mL) InPn pen Inject 16 Units into the skin once daily. Use in the event of pump failure 15 mL 3    insulin pump cart,auto,BT,G6/7 (OMNIPOD 5 G6-G7 PODS, GEN 5,) Crtg 1 each by Misc.(Non-Drug; Combo Route) route every 48 hours. 45 each 3    insulin syringe-needle U-100 0.3 mL 30 gauge x 5/16" Syrg 1 each by Misc.(Non-Drug; Combo Route) route Every 3 (three) days. 100 each 2    ixekizumab (TALTZ AUTOINJECTOR) 80 mg/mL AtIn Inject 80 mg " "into the skin every 28 days. 2 mL 3    lamoTRIgine (LAMICTAL) 100 MG tablet Take 1 tablet (100 mg total) by mouth 2 (two) times daily. 60 tablet 1    levETIRAcetam (KEPPRA) 750 MG Tab Take 1 tablet by mouth in the morning and 1 tablet before bedtime 60 tablet 6    levocetirizine (XYZAL) 5 MG tablet Take 1 tablet (5 mg total) by mouth every evening. 30 tablet 11    LIDOcaine (LIDODERM) 5 % Place 2 patches onto the skin every 12 (twelve) hours as needed (pain). Remove & Discard patch within 12 hours or as directed by MD 60 patch 11    lifitegrast (XIIDRA) 5 % Dpet Place 1 drop into both eyes 2 (two) times daily. 60 each 12    linaCLOtide (LINZESS) 145 mcg Cap capsule Take 1 capsule (145 mcg total) by mouth daily as needed (for constipation). 30 capsule 11    magnesium oxide (MAG-OX) 400 mg (241.3 mg magnesium) tablet Take 1 tablet (400 mg total) by mouth once daily. 90 tablet 3    melatonin (MELATIN) 3 mg tablet Take 2 tablets (6 mg total) by mouth nightly. 180 tablet 0    methylPREDNISolone (MEDROL DOSEPACK) 4 mg tablet use as directed 21 each 0    omeprazole (PRILOSEC) 40 MG capsule Take 1 capsule (40 mg total) by mouth once daily. 90 capsule 1    ondansetron (ZOFRAN) 4 MG tablet Take 4 mg by mouth every 6 (six) hours.      ondansetron (ZOFRAN) 4 MG tablet Take 1 tablet (4 mg total) by mouth every 6 (six) hours. 12 tablet 0    OZEMPIC 2 mg/dose (8 mg/3 mL) PnIj Inject 2 mg into the skin every 7 days. 3 mL 1    pen needle, diabetic (BD ULTRA-FINE MINI PEN NEEDLE) 31 gauge x 3/16" Ndle Use 1 a day in event of pump failure 100 each 1    polyethylene glycol (GLYCOLAX) 17 gram/dose powder Take 17 g by mouth 2 (two) times daily. 1020 g 0    terconazole (TERAZOL 7) 0.4 % Crea Place 1 applicator vaginally once daily. 45 g 1    [DISCONTINUED] amLODIPine (NORVASC) 10 MG tablet Take 1 tablet (10 mg total) by mouth once daily. 30 tablet 11    [DISCONTINUED] clonazePAM (KLONOPIN) 1 MG tablet Take 1 tablet by mouth daily 30 " tablet 0    [DISCONTINUED] furosemide (LASIX) 20 MG tablet Take 1 tablet (20 mg total) by mouth once daily. 90 tablet 3    [DISCONTINUED] glucagon, human recombinant, (GLUCAGON EMERGENCY KIT, HUMAN,) 1 mg SolR Inject 1 mg into the muscle as needed. Emergency use 1 each 1    [DISCONTINUED] metoprolol succinate (TOPROL-XL) 50 MG 24 hr tablet Take 1 tablet (50 mg total) by mouth every morning. 30 tablet 11    [DISCONTINUED] spironolactone (ALDACTONE) 25 MG tablet Take 1 tablet by mouth every morning 90 tablet 1     Current Facility-Administered Medications on File Prior to Visit   Medication Dose Route Frequency Provider Last Rate Last Admin    triamcinolone acetonide injection 40 mg  40 mg Intramuscular 1 time in Clinic/HOD Chico Jackson MD

## 2025-08-07 NOTE — PROGRESS NOTES
Pulmonary Disease Management  [facility]  Follow up Visit  Chronic Care Management    Yolanda Betts  was seen 8/7/2025  1:30 PM in the Pulmonary Disease Management Clinic for evaluation, education, reinforcement of self management techniques and exacerbation action plan.    Toño Kim    Past Medical History:   Diagnosis Date    Abnormal Pap smear of cervix     HPV genital warts    Anemia     Anxiety     Arthritis     Asthma 10/11/2016    Bipolar 1 disorder     Diabetes mellitus, type 2     Dyslipidemia associated with type 2 diabetes mellitus 05/13/2019    Dyspnea due to COVID-19 09/12/2024    General anesthetics causing adverse effect in therapeutic use     Genital warts     GERD (gastroesophageal reflux disease)     Herpes simplex virus (HSV) infection     Hyperlipidemia     Hypertension     Hypertension complicating diabetes 05/05/2019    Migraine with aura and without status migrainosus, not intractable 03/21/2016    Mild persistent asthma without complication 10/11/2016    Morbid obesity with body mass index (BMI) of 45.0 to 49.9 in adult 08/03/2017    Myoclonus     Obstructive sleep apnea     ALEN (obstructive sleep apnea)     2L per N/C q HS    Schizoaffective disorder, bipolar type 04/25/2019    Seasonal allergic rhinitis due to pollen 05/13/2019    Seizures     Type 2 diabetes mellitus with hyperglycemia, with long-term current use of insulin 12/21/2017    Type 2 diabetes mellitus with hyperglycemia, without long-term current use of insulin 12/21/2017       Patient's Medications   New Prescriptions    No medications on file   Previous Medications    ALBUTEROL (PROVENTIL) 2.5 MG /3 ML (0.083 %) NEBULIZER SOLUTION    Take 2.5 mg by nebulization every 4 (four) hours as needed. Times a day    ALPRAZOLAM (XANAX) 1 MG TABLET    Take 1 tablet (1 mg total) by mouth daily as needed for Anxiety.    AMLODIPINE (NORVASC) 5 MG TABLET    Take 1 tablet (5 mg total) by mouth once daily.    ATORVASTATIN  "(LIPITOR) 20 MG TABLET    Take 1 tablet (20 mg total) by mouth every evening    BD INSULIN SYRINGE ULTRA-FINE 1/2 ML 30 GAUGE X 1/2" SYRG        BLOOD-GLUCOSE SENSOR (DEXCOM G7 SENSOR) NHI    Use as directed for continuous glucose monitoring- change every 10 days    CLOTRIMAZOLE-BETAMETHASONE 1-0.05% (LOTRISONE) CREAM    Apply topically 2 (two) times daily.    CYCLOBENZAPRINE (FLEXERIL) 10 MG TABLET    Take ½ to 1 tablet by mouth 2 (two) times daily as needed for Muscle spasms. May cause drowsiness.    DICLOFENAC SODIUM (VOLTAREN) 1 % GEL    Apply 2 g topically 3 (three) times daily as needed (pain)    DULOXETINE (CYMBALTA) 60 MG CAPSULE    Take 1 capsule (60 mg total) by mouth 2 (two) times daily.    ERGOCALCIFEROL (ERGOCALCIFEROL) 50,000 UNIT CAP    Take 1 capsule (50,000 Units total) by mouth every 7 days.    ESTRADIOL (ESTRACE) 0.01 % (0.1 MG/GRAM) VAGINAL CREAM    Place 1 g vaginally twice a week.    FENOFIBRATE (TRICOR) 145 MG TABLET    Take 1 tablet by mouth in the morning.    FLUTICASONE PROPIONATE (FLONASE) 50 MCG/ACTUATION [place] SPRAY    Take 1 spray by Each Nostril route in the morning.    FOLIC ACID (FOLVITE) 1 MG TABLET    Take 1 tablet by mouth every day in the morning    FUROSEMIDE (LASIX) 20 MG TABLET    Take 1 tablet (20 mg total) by mouth once daily.    GLUCAGON (BAQSIMI) 3 MG/ACTUATION SPRY    SPRAY 1 SPRAY IN NOSTRIL AS NEEDED FOR EMERGENCY. MAY REPEAT 1 TIME AFTER 15 MINUTES    HALOPERIDOL (HALDOL) 2 MG TABLET    Take 1 tablet (2 mg total) by mouth 2 (two) times daily.    HYDROCORTISONE (ANUSOL-HC) 2.5 % RECTAL CREAM    Place rectally once daily for 10 days    INSULIN ASPART U-100 (NOVOLOG FLEXPEN U-100 INSULIN) 100 UNIT/ML (3 ML) INPN PEN    Inject 9 Units into the skin 3 (three) times daily before meals. Use sliding scale for small medium and large meals    INSULIN ASPART U-100 (NOVOLOG U-100 INSULIN ASPART) 100 UNIT/ML INJECTION    To use via insulin pump. Up to 200 units every 2 days    " "INSULIN GLARGINE U-100, LANTUS, (LANTUS SOLOSTAR U-100 INSULIN) 100 UNIT/ML (3 ML) INPN PEN    Inject 16 Units into the skin once daily. Use in the event of pump failure    INSULIN PUMP CART,AUTO,BT,G6/7 (OMNIPOD 5 G6-G7 PODS, GEN 5,) CRTG    1 each by Misc.(Non-Drug; Combo Route) route every 48 hours.    INSULIN SYRINGE-NEEDLE U-100 0.3 ML 30 GAUGE X 5/16" SYRG    1 each by Misc.(Non-Drug; Combo Route) route Every 3 (three) days.    IXEKIZUMAB (TALTZ AUTOINJECTOR) 80 MG/ML ATIN    Inject 80 mg into the skin every 28 days.    LAMOTRIGINE (LAMICTAL) 100 MG TABLET    Take 1 tablet (100 mg total) by mouth 2 (two) times daily.    LEVETIRACETAM (KEPPRA) 750 MG TAB    Take 1 tablet by mouth in the morning and 1 tablet before bedtime    LEVOCETIRIZINE (XYZAL) 5 MG TABLET    Take 1 tablet (5 mg total) by mouth every evening.    LIDOCAINE (LIDODERM) 5 %    Place 2 patches onto the skin every 12 (twelve) hours as needed (pain). Remove & Discard patch within 12 hours or as directed by MD    LIFITEGRAST (XIIDRA) 5 % DPET    Place 1 drop into both eyes 2 (two) times daily.    LINACLOTIDE (LINZESS) 145 MCG CAP CAPSULE    Take 1 capsule (145 mcg total) by mouth daily as needed (for constipation).    MAGNESIUM OXIDE (MAG-OX) 400 MG (241.3 MG MAGNESIUM) TABLET    Take 1 tablet (400 mg total) by mouth once daily.    MELATONIN (MELATIN) 3 MG TABLET    Take 2 tablets (6 mg total) by mouth nightly.    METHYLPREDNISOLONE (MEDROL DOSEPACK) 4 MG TABLET    use as directed    METOPROLOL SUCCINATE (TOPROL-XL) 50 MG 24 HR TABLET    Take 1 tablet (50 mg total) by mouth every morning.    OMEPRAZOLE (PRILOSEC) 40 MG CAPSULE    Take 1 capsule (40 mg total) by mouth once daily.    ONDANSETRON (ZOFRAN) 4 MG TABLET    Take 4 mg by mouth every 6 (six) hours.    ONDANSETRON (ZOFRAN) 4 MG TABLET    Take 1 tablet (4 mg total) by mouth every 6 (six) hours.    OZEMPIC 2 MG/DOSE (8 MG/3 ML) PNIJ    Inject 2 mg into the skin every 7 days.    PEN NEEDLE, " "DIABETIC (BD ULTRA-FINE MINI PEN NEEDLE) 31 GAUGE X 3/16" NDLE    Use 1 a day in event of pump failure    POLYETHYLENE GLYCOL (GLYCOLAX) 17 GRAM/DOSE POWDER    Take 17 g by mouth 2 (two) times daily.    SPIRONOLACTONE (ALDACTONE) 25 MG TABLET    Take 1 tablet by mouth every morning    TERCONAZOLE (TERAZOL 7) 0.4 % CREA    Place 1 applicator vaginally once daily.    VALSARTAN (DIOVAN) 80 MG TABLET    Take 1 tablet (80 mg total) by mouth once daily.   Modified Medications    No medications on file   Discontinued Medications    No medications on file             Educational assessment:   [x]            Good  []            Fair  []            Poor    Readiness to learn:   [x]            Good  []            Fair  []            Poor    Vision Status:   []            Good  []            Fair  []            Poor    Reading Ability:  [x]            Good  []            Fair  []            Poor    Knowledge of condition:   [x]            Good  []            Fair  []            Poor    Language Barriers:   []            Good  []            Fair  []            Poor  [x]            None    Cognitive/ Physical Barriers:   []            Good  []            Fair  []            Poor  [x]            None    Learning best by:                       [x]            Seeing  []            Hearing  []            Reading                         [x]            Doing    Describe any barrier /Limitation or financial implications of care choices identified     []            Financial  []            Emotional  []            Education  []            Vision/Hearing  []            Physical  [x]            None  []                TOPIC /CONTENT FOR TODAY:    [x]            MDI with or without spacer  []            Dry powder inhaler  []            Acapella   []           Peak Flow meter  [x]            Asthma action plan  []            Nebulizer use  []            Oxygen use safety  [x]            Breathing and cough techniques  [x]            Energy " conservation  [x]            Infection prevention  []           OTHER________________________        Learner:    [x]            Patient   []            Caregiver    Method:    [x]            Verbal explanation  [x]            Audio visual    [x]            Literature  [x]            Teach back      Evaluation:    [x]            Teach back  [x]            Demonstrate  [x]            Follow up phone call    []            2 weeks     []            4 weeks   [x]            PRN    Additional comments:   Patient was seen today to review respiratory medication purpose and proper technique for use of inhalers. Patient practiced proper technique using MDI with valved holding chamber (spacer) and DPI inhalers. Patient demonstrated understanding. Provided patient with a spacer. Literature was given to patient.    Patient reports taking Symbicort seasonally. Patient understands the difference between maintenance versus rescue medication. Discussed the prescribed frequency of use for Symbicort as maintenance therapy. Reminded patient to rinse mouth thoroughly after ICS use. Understanding was stated.      Asthma trigger checklist was verbally reviewed and literature given to patient.     Infection prevention was discussed. Patient was reminded to get influenza vaccine. Hand hygiene and cleaning of respiratory equipment was also discussed. Patient verbalized understanding.      Asthma action plan was reviewed and literature was given to patient. Patient verbalized understanding.     Plan:  Monthly Pulmonary Disease Management Questionnaire  Follow-up PDM appointment scheduled for 2/23/26  Reinforce education  Meds: Symbicort, albuterol   DME Needs: [employer]   Action Plan  Immunization: Pneumococcal- current, Flu-current , Covid 19- current  Next Provider Visit: 10/1/25  Next Spirometry/CPFT: not scheduled   Approximate time spent with patient: 45 mins

## 2025-08-11 ENCOUNTER — OFFICE VISIT (OUTPATIENT)
Dept: PSYCHIATRY | Facility: CLINIC | Age: 55
End: 2025-08-11
Payer: COMMERCIAL

## 2025-08-11 ENCOUNTER — TELEPHONE (OUTPATIENT)
Dept: PSYCHIATRY | Facility: CLINIC | Age: 55
End: 2025-08-11
Payer: MEDICARE

## 2025-08-11 DIAGNOSIS — F51.05 INSOMNIA DUE TO OTHER MENTAL DISORDER: ICD-10-CM

## 2025-08-11 DIAGNOSIS — F41.1 GENERALIZED ANXIETY DISORDER: Chronic | ICD-10-CM

## 2025-08-11 DIAGNOSIS — F99 INSOMNIA DUE TO OTHER MENTAL DISORDER: ICD-10-CM

## 2025-08-11 DIAGNOSIS — F25.0 SCHIZOAFFECTIVE DISORDER, BIPOLAR TYPE: Primary | Chronic | ICD-10-CM

## 2025-08-11 PROCEDURE — G2211 COMPLEX E/M VISIT ADD ON: HCPCS | Mod: 95,,, | Performed by: PSYCHOLOGIST

## 2025-08-11 PROCEDURE — 98007 SYNCH AUDIO-VIDEO EST HI 40: CPT | Mod: 95,,, | Performed by: PSYCHOLOGIST

## 2025-08-11 PROCEDURE — 3052F HG A1C>EQUAL 8.0%<EQUAL 9.0%: CPT | Mod: CPTII,95,, | Performed by: PSYCHOLOGIST

## 2025-08-11 PROCEDURE — 90833 PSYTX W PT W E/M 30 MIN: CPT | Mod: 95,,, | Performed by: PSYCHOLOGIST

## 2025-08-11 PROCEDURE — 4010F ACE/ARB THERAPY RXD/TAKEN: CPT | Mod: CPTII,95,, | Performed by: PSYCHOLOGIST

## 2025-08-11 PROCEDURE — 1159F MED LIST DOCD IN RCRD: CPT | Mod: CPTII,95,, | Performed by: PSYCHOLOGIST

## 2025-08-11 RX ORDER — ALPRAZOLAM 1 MG/1
1 TABLET ORAL DAILY PRN
Qty: 30 TABLET | Refills: 1 | Status: SHIPPED | OUTPATIENT
Start: 2025-08-11 | End: 2025-10-10

## 2025-08-11 RX ORDER — HALOPERIDOL 2 MG/1
2 TABLET ORAL 2 TIMES DAILY
Qty: 60 TABLET | Refills: 1 | Status: SHIPPED | OUTPATIENT
Start: 2025-08-11 | End: 2025-10-10

## 2025-08-11 RX ORDER — HYDROXYZINE PAMOATE 25 MG/1
25-50 CAPSULE ORAL NIGHTLY PRN
Qty: 60 CAPSULE | Refills: 1 | Status: SHIPPED | OUTPATIENT
Start: 2025-08-11 | End: 2025-10-10

## 2025-08-13 ENCOUNTER — PATIENT OUTREACH (OUTPATIENT)
Dept: ADMINISTRATIVE | Facility: HOSPITAL | Age: 55
End: 2025-08-13
Payer: MEDICARE

## 2025-08-13 ENCOUNTER — PATIENT MESSAGE (OUTPATIENT)
Dept: ADMINISTRATIVE | Facility: HOSPITAL | Age: 55
End: 2025-08-13
Payer: MEDICARE

## 2025-08-18 ENCOUNTER — HOSPITAL ENCOUNTER (OUTPATIENT)
Dept: RADIOLOGY | Facility: HOSPITAL | Age: 55
Discharge: HOME | End: 2025-08-18
Attending: INTERNAL MEDICINE
Payer: MEDICARE

## 2025-08-18 DIAGNOSIS — M45.9 ANKYLOSING SPONDYLITIS, UNSPECIFIED SITE OF SPINE: ICD-10-CM

## 2025-08-18 DIAGNOSIS — E55.9 VITAMIN D DEFICIENCY: ICD-10-CM

## 2025-08-18 DIAGNOSIS — R79.82 ELEVATED C-REACTIVE PROTEIN (CRP): ICD-10-CM

## 2025-08-18 PROCEDURE — 78306 BONE IMAGING WHOLE BODY: CPT | Mod: 26,,, | Performed by: RADIOLOGY

## 2025-08-18 PROCEDURE — A9512 TC99M PERTECHNETATE: HCPCS | Performed by: INTERNAL MEDICINE

## 2025-08-18 PROCEDURE — 78306 BONE IMAGING WHOLE BODY: CPT | Mod: TC

## 2025-08-18 RX ORDER — INSULIN GLARGINE 100 [IU]/ML
16 INJECTION, SOLUTION SUBCUTANEOUS DAILY
Qty: 15 ML | Refills: 0 | Status: SHIPPED | OUTPATIENT
Start: 2025-08-18

## 2025-08-18 RX ADMIN — TECHNETIUM TC-99M 14.9 MILLICURIE: 14 INJECTION, SOLUTION INTRAVENOUS at 02:08

## 2025-08-20 ENCOUNTER — OFFICE VISIT (OUTPATIENT)
Dept: HEMATOLOGY/ONCOLOGY | Facility: CLINIC | Age: 55
End: 2025-08-20
Payer: MEDICARE

## 2025-08-20 ENCOUNTER — OFFICE VISIT (OUTPATIENT)
Dept: PRIMARY CARE CLINIC | Facility: CLINIC | Age: 55
End: 2025-08-20
Payer: MEDICARE

## 2025-08-20 VITALS
HEART RATE: 90 BPM | OXYGEN SATURATION: 96 % | DIASTOLIC BLOOD PRESSURE: 80 MMHG | SYSTOLIC BLOOD PRESSURE: 118 MMHG | BODY MASS INDEX: 45.85 KG/M2 | TEMPERATURE: 99 F | HEIGHT: 56 IN | WEIGHT: 203.81 LBS

## 2025-08-20 DIAGNOSIS — D50.0 IRON DEFICIENCY ANEMIA DUE TO CHRONIC BLOOD LOSS: Primary | ICD-10-CM

## 2025-08-20 DIAGNOSIS — E78.2 MIXED HYPERLIPIDEMIA: ICD-10-CM

## 2025-08-20 DIAGNOSIS — F31.5: ICD-10-CM

## 2025-08-20 DIAGNOSIS — K76.0 FATTY LIVER: ICD-10-CM

## 2025-08-20 DIAGNOSIS — I10 ESSENTIAL HYPERTENSION: Primary | Chronic | ICD-10-CM

## 2025-08-20 DIAGNOSIS — F41.1 GENERALIZED ANXIETY DISORDER: Chronic | ICD-10-CM

## 2025-08-20 DIAGNOSIS — E66.813 CLASS 3 SEVERE OBESITY WITH SERIOUS COMORBIDITY AND BODY MASS INDEX (BMI) OF 40.0 TO 44.9 IN ADULT: ICD-10-CM

## 2025-08-20 DIAGNOSIS — E11.42 DM TYPE 2 WITH DIABETIC PERIPHERAL NEUROPATHY: ICD-10-CM

## 2025-08-20 RX ORDER — SODIUM FERRIC GLUCONATE COMPLEX IN SUCROSE 12.5 MG/ML
125 INJECTION INTRAVENOUS
OUTPATIENT
Start: 2025-08-20 | End: 2025-08-20

## 2025-08-20 RX ORDER — FENOFIBRATE 145 MG/1
145 TABLET, FILM COATED ORAL EVERY MORNING
Qty: 30 TABLET | Refills: 1 | Status: SHIPPED | OUTPATIENT
Start: 2025-08-20

## 2025-08-20 RX ORDER — ERGOCALCIFEROL 1.25 MG/1
50000 CAPSULE ORAL
Qty: 4 CAPSULE | Refills: 1 | Status: SHIPPED | OUTPATIENT
Start: 2025-08-20 | End: 2025-10-15

## 2025-08-21 ENCOUNTER — TELEPHONE (OUTPATIENT)
Dept: DIABETES | Facility: CLINIC | Age: 55
End: 2025-08-21
Payer: MEDICARE

## 2025-08-22 ENCOUNTER — CLINICAL SUPPORT (OUTPATIENT)
Dept: DIABETES | Facility: CLINIC | Age: 55
End: 2025-08-22
Payer: MEDICARE

## 2025-08-22 VITALS — HEIGHT: 56 IN | BODY MASS INDEX: 45.43 KG/M2 | WEIGHT: 201.94 LBS

## 2025-08-22 DIAGNOSIS — Z79.4 TYPE 2 DIABETES MELLITUS WITH HYPERGLYCEMIA, WITH LONG-TERM CURRENT USE OF INSULIN: Primary | Chronic | ICD-10-CM

## 2025-08-22 DIAGNOSIS — E11.65 TYPE 2 DIABETES MELLITUS WITH HYPERGLYCEMIA, WITH LONG-TERM CURRENT USE OF INSULIN: Primary | Chronic | ICD-10-CM

## 2025-08-22 PROCEDURE — G0108 DIAB MANAGE TRN  PER INDIV: HCPCS | Mod: S$GLB,,, | Performed by: DIETITIAN, REGISTERED

## 2025-08-22 PROCEDURE — 99999 PR PBB SHADOW E&M-EST. PATIENT-LVL IV: CPT | Mod: PBBFAC,,, | Performed by: DIETITIAN, REGISTERED

## 2025-08-26 ENCOUNTER — PATIENT MESSAGE (OUTPATIENT)
Dept: INFUSION THERAPY | Facility: HOSPITAL | Age: 55
End: 2025-08-26
Payer: MEDICARE

## 2025-08-27 ENCOUNTER — TELEPHONE (OUTPATIENT)
Dept: DIABETES | Facility: CLINIC | Age: 55
End: 2025-08-27
Payer: MEDICARE

## 2025-08-27 ENCOUNTER — OFFICE VISIT (OUTPATIENT)
Dept: DIABETES | Facility: CLINIC | Age: 55
End: 2025-08-27
Payer: MEDICARE

## 2025-08-27 DIAGNOSIS — E11.42 DIABETIC POLYNEUROPATHY ASSOCIATED WITH TYPE 2 DIABETES MELLITUS: Primary | Chronic | ICD-10-CM

## 2025-08-27 DIAGNOSIS — F25.0 SCHIZOAFFECTIVE DISORDER, BIPOLAR TYPE: Chronic | ICD-10-CM

## 2025-08-27 DIAGNOSIS — E66.813 CLASS 3 SEVERE OBESITY WITH SERIOUS COMORBIDITY AND BODY MASS INDEX (BMI) OF 40.0 TO 44.9 IN ADULT: ICD-10-CM

## 2025-08-27 DIAGNOSIS — F41.1 GENERALIZED ANXIETY DISORDER: Chronic | ICD-10-CM

## 2025-08-27 PROCEDURE — 1159F MED LIST DOCD IN RCRD: CPT | Mod: CPTII,95,, | Performed by: NURSE PRACTITIONER

## 2025-08-27 PROCEDURE — G2211 COMPLEX E/M VISIT ADD ON: HCPCS | Mod: 95,,, | Performed by: NURSE PRACTITIONER

## 2025-08-27 PROCEDURE — 4010F ACE/ARB THERAPY RXD/TAKEN: CPT | Mod: CPTII,95,, | Performed by: NURSE PRACTITIONER

## 2025-08-27 PROCEDURE — 98006 SYNCH AUDIO-VIDEO EST MOD 30: CPT | Mod: 95,,, | Performed by: NURSE PRACTITIONER

## 2025-08-27 PROCEDURE — 3052F HG A1C>EQUAL 8.0%<EQUAL 9.0%: CPT | Mod: CPTII,95,, | Performed by: NURSE PRACTITIONER

## 2025-08-27 PROCEDURE — 95251 CONT GLUC MNTR ANALYSIS I&R: CPT | Mod: NDTC,,, | Performed by: NURSE PRACTITIONER

## 2025-08-27 PROCEDURE — 1160F RVW MEDS BY RX/DR IN RCRD: CPT | Mod: CPTII,95,, | Performed by: NURSE PRACTITIONER

## 2025-08-28 ENCOUNTER — TELEPHONE (OUTPATIENT)
Dept: INFUSION THERAPY | Facility: HOSPITAL | Age: 55
End: 2025-08-28
Payer: MEDICARE

## 2025-09-01 ENCOUNTER — PATIENT OUTREACH (OUTPATIENT)
Facility: OTHER | Age: 55
End: 2025-09-01
Payer: MEDICARE

## 2025-09-01 ENCOUNTER — OCHSNER VIRTUAL EMERGENCY DEPARTMENT (OUTPATIENT)
Facility: CLINIC | Age: 55
End: 2025-09-01
Payer: MEDICARE

## 2025-09-01 ENCOUNTER — NURSE TRIAGE (OUTPATIENT)
Dept: ADMINISTRATIVE | Facility: CLINIC | Age: 55
End: 2025-09-01
Payer: MEDICARE

## 2025-09-01 DIAGNOSIS — R11.0 NAUSEA: Primary | ICD-10-CM

## 2025-09-02 ENCOUNTER — PATIENT OUTREACH (OUTPATIENT)
Facility: OTHER | Age: 55
End: 2025-09-02
Payer: MEDICARE

## 2025-09-02 DIAGNOSIS — E11.65 TYPE 2 DIABETES MELLITUS WITH HYPERGLYCEMIA, WITH LONG-TERM CURRENT USE OF INSULIN: ICD-10-CM

## 2025-09-02 DIAGNOSIS — Z79.4 TYPE 2 DIABETES MELLITUS WITH HYPERGLYCEMIA, WITH LONG-TERM CURRENT USE OF INSULIN: ICD-10-CM

## 2025-09-02 RX ORDER — INSULIN ASPART 100 [IU]/ML
9 INJECTION, SOLUTION INTRAVENOUS; SUBCUTANEOUS
Qty: 15 ML | Refills: 3 | OUTPATIENT
Start: 2025-09-02

## 2025-09-02 RX ORDER — BLOOD-GLUCOSE SENSOR
EACH MISCELLANEOUS
Qty: 3 EACH | Refills: 0 | OUTPATIENT
Start: 2025-09-02

## 2025-09-03 ENCOUNTER — TELEPHONE (OUTPATIENT)
Dept: RHEUMATOLOGY | Facility: CLINIC | Age: 55
End: 2025-09-03
Payer: MEDICARE

## 2025-09-03 ENCOUNTER — E-VISIT (OUTPATIENT)
Dept: RHEUMATOLOGY | Facility: CLINIC | Age: 55
End: 2025-09-03
Payer: MEDICARE

## 2025-09-03 DIAGNOSIS — E11.65 TYPE 2 DIABETES MELLITUS WITH HYPERGLYCEMIA, WITH LONG-TERM CURRENT USE OF INSULIN: ICD-10-CM

## 2025-09-03 DIAGNOSIS — M45.9 ANKYLOSING SPONDYLITIS, UNSPECIFIED SITE OF SPINE: Primary | ICD-10-CM

## 2025-09-03 DIAGNOSIS — E11.69 TYPE 2 DIABETES MELLITUS WITH OTHER SPECIFIED COMPLICATION, UNSPECIFIED WHETHER LONG TERM INSULIN USE: ICD-10-CM

## 2025-09-03 DIAGNOSIS — Z79.4 TYPE 2 DIABETES MELLITUS WITH HYPERGLYCEMIA, WITH LONG-TERM CURRENT USE OF INSULIN: ICD-10-CM

## 2025-09-03 DIAGNOSIS — M54.9 DORSALGIA: ICD-10-CM

## 2025-09-03 RX ORDER — INSULIN ASPART 100 [IU]/ML
9 INJECTION, SOLUTION INTRAVENOUS; SUBCUTANEOUS
Qty: 15 ML | Refills: 3 | Status: SHIPPED | OUTPATIENT
Start: 2025-09-03

## 2025-09-03 RX ORDER — PREDNISONE 5 MG/1
5 TABLET ORAL DAILY
Qty: 30 TABLET | Refills: 0 | Status: SHIPPED | OUTPATIENT
Start: 2025-09-03

## 2025-09-04 ENCOUNTER — HOSPITAL ENCOUNTER (OUTPATIENT)
Dept: RADIOLOGY | Facility: HOSPITAL | Age: 55
Discharge: HOME | End: 2025-09-04
Attending: NURSE PRACTITIONER
Payer: MEDICARE

## 2025-09-04 ENCOUNTER — PROCEDURE VISIT (OUTPATIENT)
Dept: UROLOGY | Facility: CLINIC | Age: 55
End: 2025-09-04
Payer: MEDICARE

## 2025-09-04 VITALS
HEIGHT: 56 IN | BODY MASS INDEX: 45.13 KG/M2 | DIASTOLIC BLOOD PRESSURE: 76 MMHG | SYSTOLIC BLOOD PRESSURE: 107 MMHG | HEART RATE: 87 BPM | WEIGHT: 200.63 LBS

## 2025-09-04 DIAGNOSIS — R31.0 HEMATURIA, GROSS: Primary | ICD-10-CM

## 2025-09-04 DIAGNOSIS — R31.0 HEMATURIA, GROSS: ICD-10-CM

## 2025-09-04 LAB
BILIRUBIN, UA POC OHS: NEGATIVE
BLOOD, UA POC OHS: NEGATIVE
CLARITY, UA POC OHS: CLEAR
COLOR, UA POC OHS: YELLOW
GLUCOSE, UA POC OHS: NEGATIVE
KETONES, UA POC OHS: NEGATIVE
LEUKOCYTES, UA POC OHS: NEGATIVE
NITRITE, UA POC OHS: NEGATIVE
PH, UA POC OHS: 6
PROTEIN, UA POC OHS: 30
SPECIFIC GRAVITY, UA POC OHS: >=1.03
UROBILINOGEN, UA POC OHS: 0.2

## 2025-09-04 PROCEDURE — 74178 CT ABD&PLV WO CNTR FLWD CNTR: CPT | Mod: TC

## 2025-09-04 PROCEDURE — 25500020 PHARM REV CODE 255: Performed by: NURSE PRACTITIONER

## 2025-09-04 PROCEDURE — 52000 CYSTOURETHROSCOPY: CPT | Mod: S$GLB,,, | Performed by: UROLOGY

## 2025-09-04 PROCEDURE — 81003 URINALYSIS AUTO W/O SCOPE: CPT | Mod: QW,S$GLB,, | Performed by: UROLOGY

## 2025-09-04 PROCEDURE — 74178 CT ABD&PLV WO CNTR FLWD CNTR: CPT | Mod: 26,,, | Performed by: STUDENT IN AN ORGANIZED HEALTH CARE EDUCATION/TRAINING PROGRAM

## 2025-09-04 RX ORDER — LIDOCAINE HYDROCHLORIDE 20 MG/ML
JELLY TOPICAL
Status: SHIPPED | OUTPATIENT
Start: 2025-09-04

## 2025-09-04 RX ORDER — CIPROFLOXACIN 500 MG/1
500 TABLET, FILM COATED ORAL
Status: SHIPPED | OUTPATIENT
Start: 2025-09-04

## 2025-09-04 RX ADMIN — IOHEXOL 100 ML: 350 INJECTION, SOLUTION INTRAVENOUS at 03:09

## 2025-09-05 ENCOUNTER — PATIENT OUTREACH (OUTPATIENT)
Facility: OTHER | Age: 55
End: 2025-09-05
Payer: MEDICARE

## 2025-09-05 RX ORDER — BLOOD-GLUCOSE SENSOR
EACH MISCELLANEOUS
Qty: 3 EACH | Refills: 2 | Status: SHIPPED | OUTPATIENT
Start: 2025-09-05

## 2025-09-06 ENCOUNTER — NURSE TRIAGE (OUTPATIENT)
Dept: ADMINISTRATIVE | Facility: CLINIC | Age: 55
End: 2025-09-06
Payer: MEDICARE

## (undated) DEVICE — GLOVE SURGICAL LATEX SZ 6.5

## (undated) DEVICE — SOL 9P NACL IRR PIC IL

## (undated) DEVICE — SYR 10CC LUER LOCK

## (undated) DEVICE — NDL PNEUMO INSUFFLATI 120MM

## (undated) DEVICE — UNDERGLOVES BIOGEL PI SIZE 8.5

## (undated) DEVICE — BANDAGE ESMARK ELASTIC ST 4X9

## (undated) DEVICE — SPONGE COTTON TRAY 4X4IN

## (undated) DEVICE — CLIP HEMO-LOK ML

## (undated) DEVICE — KIT ANTIFOG

## (undated) DEVICE — POSITIONER HEAD DONUT 9IN FOAM

## (undated) DEVICE — GLOVE BIOGEL SZ 8 1/2

## (undated) DEVICE — PAD CAST SPECIALIST STRL 3

## (undated) DEVICE — EVACUATOR KIT SMOKE PLUME AWAY

## (undated) DEVICE — SUPPORT ULNA NERVE PROTECTOR

## (undated) DEVICE — SUT MONOCRYL 4.0 PS2 CP496G

## (undated) DEVICE — SEE MEDLINE ITEM 157027

## (undated) DEVICE — COVER OVERHEAD SURG LT BLUE

## (undated) DEVICE — PACK BASIC SETUP SC BR

## (undated) DEVICE — COVER LIGHT HANDLE 80/CA

## (undated) DEVICE — ANGIOTOUCH KIT

## (undated) DEVICE — DRAPE ABDOMINAL TIBURON 14X11

## (undated) DEVICE — SEE MEDLINE ITEM 157117

## (undated) DEVICE — NDL SAFETY 25G X 1.5 ECLIPSE

## (undated) DEVICE — GOWN NONREINF SET-IN SLV 2XL

## (undated) DEVICE — CATH PIG145 INFINITI 5X110CM

## (undated) DEVICE — DRAPE HAND STERILE

## (undated) DEVICE — TOWEL OR DISP STRL BLUE 4/PK

## (undated) DEVICE — ADHESIVE DERMABOND ADVANCED

## (undated) DEVICE — SOL STRL WATER INJ 1000ML BG

## (undated) DEVICE — APPLICATOR CHLORAPREP ORN 26ML

## (undated) DEVICE — WIRE GUIDE TEFLON 3CM .035 145

## (undated) DEVICE — SOL NS 1000CC

## (undated) DEVICE — PAD ABD 8X10 STERILE

## (undated) DEVICE — CATH OPTITORQUE TIGER 5F 100CM

## (undated) DEVICE — TUBING HEATED INSUFFLATOR

## (undated) DEVICE — KIT MANIFOLD LOW PRESS TUBING

## (undated) DEVICE — UNDERGLOVES BIOGEL PI SIZE 7.5

## (undated) DEVICE — KIT GLIDESHEATH SLEND 6FR 10CM

## (undated) DEVICE — UNDERGLOVE BIOGEL PI SZ 6.5 LF

## (undated) DEVICE — SEE MEDLINE ITEM 152622

## (undated) DEVICE — COVER TIP CURVED SCISSORS XI

## (undated) DEVICE — DRESSING XEROFORM FOIL PK 1X8

## (undated) DEVICE — ALCOHOL 70% ISOP RUBBING 4OZ

## (undated) DEVICE — UNDERGLOVES BIOGEL PI SZ 7 LF

## (undated) DEVICE — SUT 4-0 ETHILON 18 PS-2

## (undated) DEVICE — GRASPER TIP RENEW LAP FENEST

## (undated) DEVICE — SCRUB HIBICLENS 4% CHG 4OZ

## (undated) DEVICE — GLOVE SURGICAL LATEX SZ 7

## (undated) DEVICE — SEAL UNIVERSAL 5MM-8MM XI

## (undated) DEVICE — DRAPE THREE-QTR REINF 53X77IN

## (undated) DEVICE — ELECTRODE REM PLYHSV RETURN 9

## (undated) DEVICE — OMNIPAQUE 300MG 150ML VIAL

## (undated) DEVICE — BAND TR COMP DEVICE REG 24CM

## (undated) DEVICE — SEE MEDLINE ITEM 157131

## (undated) DEVICE — KIT SYR REUSABLE

## (undated) DEVICE — OBTURATOR BLADELESS 8MM XI CLR

## (undated) DEVICE — BAG TISSUE RETRIEVAL 5MM

## (undated) DEVICE — DRAPE ARM DAVINCI XI

## (undated) DEVICE — DRAPE U SPLIT SHEET 54X76IN

## (undated) DEVICE — DRAPE COLUMN DAVINCI XI

## (undated) DEVICE — PACK HEART CATH BR

## (undated) DEVICE — MANIFOLD 4 PORT

## (undated) DEVICE — DRAPE ANGIO BRACH 38X44IN

## (undated) DEVICE — COVER CAMERA OPERATING ROOM

## (undated) DEVICE — GOWN POLY REINF BRTH SLV XL